# Patient Record
Sex: MALE | Race: WHITE | NOT HISPANIC OR LATINO | Employment: OTHER | URBAN - METROPOLITAN AREA
[De-identification: names, ages, dates, MRNs, and addresses within clinical notes are randomized per-mention and may not be internally consistent; named-entity substitution may affect disease eponyms.]

---

## 2017-01-11 ENCOUNTER — GENERIC CONVERSION - ENCOUNTER (OUTPATIENT)
Dept: OTHER | Facility: OTHER | Age: 57
End: 2017-01-11

## 2017-01-11 ENCOUNTER — TRANSCRIBE ORDERS (OUTPATIENT)
Dept: ADMINISTRATIVE | Facility: HOSPITAL | Age: 57
End: 2017-01-11

## 2017-01-11 ENCOUNTER — ALLSCRIPTS OFFICE VISIT (OUTPATIENT)
Dept: OTHER | Facility: OTHER | Age: 57
End: 2017-01-11

## 2017-01-11 ENCOUNTER — HOSPITAL ENCOUNTER (OUTPATIENT)
Dept: RADIOLOGY | Facility: HOSPITAL | Age: 57
Discharge: HOME | End: 2017-01-11
Payer: COMMERCIAL

## 2017-01-11 DIAGNOSIS — K74.60 CIRRHOSIS OF LIVER (HCC): ICD-10-CM

## 2017-01-11 DIAGNOSIS — M79.671 PAIN OF RIGHT FOOT: ICD-10-CM

## 2017-01-11 PROCEDURE — 73630 X-RAY EXAM OF FOOT: CPT

## 2017-01-17 ENCOUNTER — GENERIC CONVERSION - ENCOUNTER (OUTPATIENT)
Dept: OTHER | Facility: OTHER | Age: 57
End: 2017-01-17

## 2017-01-17 ENCOUNTER — LAB CONVERSION - ENCOUNTER (OUTPATIENT)
Dept: OTHER | Facility: OTHER | Age: 57
End: 2017-01-17

## 2017-01-17 LAB — HBA1C MFR BLD HPLC: 8.9 % OF TOTAL HGB

## 2017-02-18 ENCOUNTER — GENERIC CONVERSION - ENCOUNTER (OUTPATIENT)
Dept: OTHER | Facility: OTHER | Age: 57
End: 2017-02-18

## 2017-02-27 ENCOUNTER — HOSPITAL ENCOUNTER (EMERGENCY)
Facility: HOSPITAL | Age: 57
Discharge: HOME | End: 2017-02-27
Attending: EMERGENCY MEDICINE | Admitting: EMERGENCY MEDICINE
Payer: COMMERCIAL

## 2017-02-27 ENCOUNTER — APPOINTMENT (EMERGENCY)
Dept: CT IMAGING | Facility: HOSPITAL | Age: 57
End: 2017-02-27
Payer: COMMERCIAL

## 2017-02-27 VITALS
TEMPERATURE: 98 F | BODY MASS INDEX: 37.81 KG/M2 | WEIGHT: 241.4 LBS | HEART RATE: 82 BPM | RESPIRATION RATE: 18 BRPM | DIASTOLIC BLOOD PRESSURE: 67 MMHG | SYSTOLIC BLOOD PRESSURE: 149 MMHG | OXYGEN SATURATION: 96 %

## 2017-02-27 DIAGNOSIS — H53.412 SCOTOMA, LEFT: Primary | ICD-10-CM

## 2017-02-27 LAB
ANION GAP SERPL CALCULATED.3IONS-SCNC: 8 MMOL/L (ref 4–13)
BASOPHILS # BLD AUTO: 0.02 THOUSANDS/ΜL (ref 0–0.1)
BASOPHILS NFR BLD AUTO: 0 % (ref 0–1)
BUN SERPL-MCNC: 14 MG/DL (ref 5–25)
CALCIUM SERPL-MCNC: 9.1 MG/DL (ref 8.3–10.1)
CHLORIDE SERPL-SCNC: 100 MMOL/L (ref 100–108)
CO2 SERPL-SCNC: 26 MMOL/L (ref 21–32)
CREAT SERPL-MCNC: 1.23 MG/DL (ref 0.6–1.3)
EOSINOPHIL # BLD AUTO: 0.19 THOUSAND/ΜL (ref 0–0.61)
EOSINOPHIL NFR BLD AUTO: 2 % (ref 0–6)
ERYTHROCYTE [DISTWIDTH] IN BLOOD BY AUTOMATED COUNT: 13.5 % (ref 11.6–15.1)
GFR SERPL CREATININE-BSD FRML MDRD: >60 ML/MIN/1.73SQ M
GLUCOSE SERPL-MCNC: 275 MG/DL (ref 65–140)
HCT VFR BLD AUTO: 46.6 % (ref 36.5–49.3)
HGB BLD-MCNC: 15.9 G/DL (ref 12–17)
LYMPHOCYTES # BLD AUTO: 2.11 THOUSANDS/ΜL (ref 0.6–4.47)
LYMPHOCYTES NFR BLD AUTO: 27 % (ref 14–44)
MCH RBC QN AUTO: 28.6 PG (ref 26.8–34.3)
MCHC RBC AUTO-ENTMCNC: 34.1 G/DL (ref 31.4–37.4)
MCV RBC AUTO: 84 FL (ref 82–98)
MONOCYTES # BLD AUTO: 0.64 THOUSAND/ΜL (ref 0.17–1.22)
MONOCYTES NFR BLD AUTO: 8 % (ref 4–12)
NEUTROPHILS # BLD AUTO: 4.94 THOUSANDS/ΜL (ref 1.85–7.62)
NEUTS SEG NFR BLD AUTO: 63 % (ref 43–75)
PLATELET # BLD AUTO: 192 THOUSANDS/UL (ref 149–390)
PMV BLD AUTO: 11.2 FL (ref 8.9–12.7)
POTASSIUM SERPL-SCNC: 5 MMOL/L (ref 3.5–5.3)
RBC # BLD AUTO: 5.55 MILLION/UL (ref 3.88–5.62)
SODIUM SERPL-SCNC: 134 MMOL/L (ref 136–145)
WBC # BLD AUTO: 7.9 THOUSAND/UL (ref 4.31–10.16)

## 2017-02-27 PROCEDURE — 70450 CT HEAD/BRAIN W/O DYE: CPT

## 2017-02-27 PROCEDURE — 36415 COLL VENOUS BLD VENIPUNCTURE: CPT | Performed by: EMERGENCY MEDICINE

## 2017-02-27 PROCEDURE — 85025 COMPLETE CBC W/AUTO DIFF WBC: CPT | Performed by: EMERGENCY MEDICINE

## 2017-02-27 PROCEDURE — 93005 ELECTROCARDIOGRAM TRACING: CPT | Performed by: EMERGENCY MEDICINE

## 2017-02-27 PROCEDURE — 80048 BASIC METABOLIC PNL TOTAL CA: CPT | Performed by: EMERGENCY MEDICINE

## 2017-02-27 PROCEDURE — 99284 EMERGENCY DEPT VISIT MOD MDM: CPT

## 2017-02-28 LAB
ATRIAL RATE: 90 BPM
P AXIS: 38 DEGREES
PR INTERVAL: 136 MS
QRS AXIS: -11 DEGREES
QRSD INTERVAL: 106 MS
QT INTERVAL: 364 MS
QTC INTERVAL: 438 MS
T WAVE AXIS: 29 DEGREES
VENTRICULAR RATE: 87 BPM

## 2017-04-21 ENCOUNTER — GENERIC CONVERSION - ENCOUNTER (OUTPATIENT)
Dept: OTHER | Facility: OTHER | Age: 57
End: 2017-04-21

## 2017-06-08 ENCOUNTER — GENERIC CONVERSION - ENCOUNTER (OUTPATIENT)
Dept: OTHER | Facility: OTHER | Age: 57
End: 2017-06-08

## 2017-06-14 ENCOUNTER — GENERIC CONVERSION - ENCOUNTER (OUTPATIENT)
Dept: OTHER | Facility: OTHER | Age: 57
End: 2017-06-14

## 2017-07-23 ENCOUNTER — HOSPITAL ENCOUNTER (EMERGENCY)
Facility: HOSPITAL | Age: 57
Discharge: HOME | End: 2017-07-23
Attending: EMERGENCY MEDICINE
Payer: COMMERCIAL

## 2017-07-23 VITALS
HEART RATE: 87 BPM | SYSTOLIC BLOOD PRESSURE: 148 MMHG | TEMPERATURE: 97.6 F | BODY MASS INDEX: 38.43 KG/M2 | RESPIRATION RATE: 16 BRPM | DIASTOLIC BLOOD PRESSURE: 94 MMHG | WEIGHT: 245.37 LBS | OXYGEN SATURATION: 95 %

## 2017-07-23 DIAGNOSIS — K52.9 GASTROENTERITIS: Primary | ICD-10-CM

## 2017-07-23 LAB
ACETONE SERPL-MCNC: NEGATIVE MG/DL
ALBUMIN SERPL BCP-MCNC: 3.4 G/DL (ref 3.5–5)
ALP SERPL-CCNC: 150 U/L (ref 46–116)
ALT SERPL W P-5'-P-CCNC: 25 U/L (ref 12–78)
ANION GAP SERPL CALCULATED.3IONS-SCNC: 10 MMOL/L (ref 4–13)
AST SERPL W P-5'-P-CCNC: 14 U/L (ref 5–45)
BASOPHILS # BLD AUTO: 0.02 THOUSANDS/ΜL (ref 0–0.1)
BASOPHILS NFR BLD AUTO: 0 % (ref 0–1)
BILIRUB SERPL-MCNC: 0.6 MG/DL (ref 0.2–1)
BUN SERPL-MCNC: 29 MG/DL (ref 5–25)
CALCIUM SERPL-MCNC: 9.5 MG/DL (ref 8.3–10.1)
CHLORIDE SERPL-SCNC: 100 MMOL/L (ref 100–108)
CO2 SERPL-SCNC: 25 MMOL/L (ref 21–32)
CREAT SERPL-MCNC: 1.89 MG/DL (ref 0.6–1.3)
EOSINOPHIL # BLD AUTO: 0.06 THOUSAND/ΜL (ref 0–0.61)
EOSINOPHIL NFR BLD AUTO: 1 % (ref 0–6)
ERYTHROCYTE [DISTWIDTH] IN BLOOD BY AUTOMATED COUNT: 14 % (ref 11.6–15.1)
GFR SERPL CREATININE-BSD FRML MDRD: 36.9 ML/MIN/1.73SQ M
GLUCOSE SERPL-MCNC: 317 MG/DL (ref 65–140)
GLUCOSE SERPL-MCNC: 378 MG/DL (ref 65–140)
HCT VFR BLD AUTO: 47.1 % (ref 36.5–49.3)
HGB BLD-MCNC: 15.9 G/DL (ref 12–17)
LIPASE SERPL-CCNC: 137 U/L (ref 73–393)
LYMPHOCYTES # BLD AUTO: 1.61 THOUSANDS/ΜL (ref 0.6–4.47)
LYMPHOCYTES NFR BLD AUTO: 17 % (ref 14–44)
MCH RBC QN AUTO: 28.3 PG (ref 26.8–34.3)
MCHC RBC AUTO-ENTMCNC: 33.8 G/DL (ref 31.4–37.4)
MCV RBC AUTO: 84 FL (ref 82–98)
MONOCYTES # BLD AUTO: 0.57 THOUSAND/ΜL (ref 0.17–1.22)
MONOCYTES NFR BLD AUTO: 6 % (ref 4–12)
NEUTROPHILS # BLD AUTO: 7.31 THOUSANDS/ΜL (ref 1.85–7.62)
NEUTS SEG NFR BLD AUTO: 76 % (ref 43–75)
PLATELET # BLD AUTO: 209 THOUSANDS/UL (ref 149–390)
PMV BLD AUTO: 10.8 FL (ref 8.9–12.7)
POTASSIUM SERPL-SCNC: 4.4 MMOL/L (ref 3.5–5.3)
PROT SERPL-MCNC: 7.7 G/DL (ref 6.4–8.2)
RBC # BLD AUTO: 5.61 MILLION/UL (ref 3.88–5.62)
SODIUM SERPL-SCNC: 135 MMOL/L (ref 136–145)
WBC # BLD AUTO: 9.57 THOUSAND/UL (ref 4.31–10.16)

## 2017-07-23 PROCEDURE — 82009 KETONE BODYS QUAL: CPT | Performed by: EMERGENCY MEDICINE

## 2017-07-23 PROCEDURE — 99283 EMERGENCY DEPT VISIT LOW MDM: CPT

## 2017-07-23 PROCEDURE — 96361 HYDRATE IV INFUSION ADD-ON: CPT

## 2017-07-23 PROCEDURE — 96360 HYDRATION IV INFUSION INIT: CPT

## 2017-07-23 PROCEDURE — 82948 REAGENT STRIP/BLOOD GLUCOSE: CPT

## 2017-07-23 PROCEDURE — 80053 COMPREHEN METABOLIC PANEL: CPT | Performed by: EMERGENCY MEDICINE

## 2017-07-23 PROCEDURE — 96374 THER/PROPH/DIAG INJ IV PUSH: CPT

## 2017-07-23 PROCEDURE — 85025 COMPLETE CBC W/AUTO DIFF WBC: CPT | Performed by: EMERGENCY MEDICINE

## 2017-07-23 PROCEDURE — 36415 COLL VENOUS BLD VENIPUNCTURE: CPT | Performed by: EMERGENCY MEDICINE

## 2017-07-23 PROCEDURE — 83690 ASSAY OF LIPASE: CPT | Performed by: EMERGENCY MEDICINE

## 2017-07-23 RX ORDER — DICYCLOMINE HCL 20 MG
20 TABLET ORAL 2 TIMES DAILY
Qty: 20 TABLET | Refills: 0 | Status: SHIPPED | OUTPATIENT
Start: 2017-07-23 | End: 2018-07-29 | Stop reason: ALTCHOICE

## 2017-07-23 RX ORDER — ONDANSETRON 4 MG/1
4 TABLET, ORALLY DISINTEGRATING ORAL EVERY 8 HOURS PRN
Qty: 20 TABLET | Refills: 0 | Status: SHIPPED | OUTPATIENT
Start: 2017-07-23 | End: 2018-03-21

## 2017-07-23 RX ORDER — OMEPRAZOLE 40 MG/1
40 CAPSULE, DELAYED RELEASE ORAL
COMMUNITY
End: 2018-03-28

## 2017-07-23 RX ORDER — DIPHENOXYLATE HYDROCHLORIDE AND ATROPINE SULFATE 2.5; .025 MG/1; MG/1
TABLET ORAL 2 TIMES DAILY
COMMUNITY
Start: 2016-09-12 | End: 2018-03-12 | Stop reason: SDUPTHER

## 2017-07-23 RX ORDER — CARVEDILOL 3.12 MG/1
TABLET ORAL DAILY
COMMUNITY
Start: 2016-01-26 | End: 2018-06-15

## 2017-07-23 RX ORDER — KETOROLAC TROMETHAMINE 30 MG/ML
15 INJECTION, SOLUTION INTRAMUSCULAR; INTRAVENOUS ONCE
Status: DISCONTINUED | OUTPATIENT
Start: 2017-07-23 | End: 2017-07-23 | Stop reason: HOSPADM

## 2017-07-23 RX ORDER — GABAPENTIN 600 MG/1
600 TABLET ORAL DAILY
Status: ON HOLD | COMMUNITY
End: 2018-08-12 | Stop reason: CLARIF

## 2017-07-23 RX ORDER — DICYCLOMINE HCL 20 MG
TABLET ORAL
Status: COMPLETED
Start: 2017-07-23 | End: 2017-07-23

## 2017-07-23 RX ORDER — LOVASTATIN 20 MG/1
TABLET ORAL DAILY
COMMUNITY
Start: 2016-04-12 | End: 2018-04-23 | Stop reason: SDUPTHER

## 2017-07-23 RX ORDER — ONDANSETRON 2 MG/ML
4 INJECTION INTRAMUSCULAR; INTRAVENOUS ONCE
Status: DISCONTINUED | OUTPATIENT
Start: 2017-07-23 | End: 2017-07-23 | Stop reason: HOSPADM

## 2017-07-23 RX ORDER — INSULIN ASPART 100 [IU]/ML
30 INJECTION, SUSPENSION SUBCUTANEOUS 2 TIMES DAILY
COMMUNITY
End: 2018-03-21 | Stop reason: SDUPTHER

## 2017-07-23 RX ORDER — LISINOPRIL 40 MG/1
40 TABLET ORAL
COMMUNITY
End: 2018-03-28

## 2017-07-23 RX ADMIN — SODIUM CHLORIDE 1000 ML: 0.9 INJECTION, SOLUTION INTRAVENOUS at 11:27

## 2017-07-23 RX ADMIN — DICYCLOMINE HYDROCHLORIDE 20 MG: 20 TABLET ORAL at 13:55

## 2017-07-23 RX ADMIN — SODIUM CHLORIDE 1000 ML: 0.9 INJECTION, SOLUTION INTRAVENOUS at 12:48

## 2017-07-23 RX ADMIN — INSULIN HUMAN 5 UNITS: 100 INJECTION, SOLUTION PARENTERAL at 12:57

## 2018-01-10 NOTE — MISCELLANEOUS
Message   Recorded as Task   Date: 01/17/2017 07:38 AM, Created By: Pal Jett   Task Name: Call Patient with results   Assigned To: Pal Jett   Regarding Patient: Juana Foote, Status: In Progress   Comment:    Raj Auguste - 17 Jan 2017 7:38 AM     Patient Phone: [phone]      blood test results are back and HGA1C is still elevated at 8 9%  Is he taking 30 units twice a day of 70/30 insulin? Any missed doses of insulin? if yes, how many missed per week? Patrice Desai needs to make sure his home blood sugar meter is working correctly, should purchase 'control' solution for his meter at pharmacy which will verify its accuracy  then, please have Patrice Desai schedule an appt 1 week later with at least twice a day(morning and before dinner OR 2 hrs after dinner) blood sugar readings to bring to office for review  let me know if questions   Lina Peña - 17 Jan 2017 8:31 AM     TASK REASSIGNED: Previously Assigned To George Stone - 17 Jan 2017 8:57 AM     TASK IN PROGRESS   Lina Peña - 17 Jan 2017 8:57 AM     TASK EDITED  left message   Sasha Alicia - 17 Jan 2017 8:57 AM     TASK EDITED  left message for pt to call back   Lina Peña - 17 Jan 2017 1:54 PM     TASK EDITED  left message for pt to call back, pt had called and spoke with Myriam Miguel gave me the message to call him back   Lina Peña - 17 Jan 2017 3:14 PM     TASK EDITED  Patient's wife will have patient call back   Sasha Vargas - 17 Jan 2017 3:48 PM     TASK EDITED  Spoke with patient who stated that he is taking the 30 units daily and has not missed a dose  He stated that for the past two days he has not checked his sugar his machine broke and when he called to get a new one they stated it would be $150 and he does not have the money to purchase  Wanted to know if we can give him a machine that he can   He scheduled an appt with us next wednesday     Raj Auguste - 17 Jan 2017 4:18 PM     TASK REPLIED TO: Previously Assigned To Raj Auguste  yes, that's fine    we can give him accu-check but I don't know if supplies are covered by his insurance    let me know if he would like accu-check meter, so I can order supplies for his new meter    jose albertoholley   KeiraSusanneBrenda - 17 Jan 2017 5:08 PM     TASK EDITED  spoke to pt and pt wife   pt asked that we just order the supplies to his pharmacy to see if its covered and said he can  a meter in office on Thursday        Plan  Diabetes mellitus type 2, controlled    · Accu-Chek FastClix Lancets Miscellaneous; TEST up to 3 TIMES DAILY OR AS  DIRECTED   · Accu-Chek Zayda Plus In Vitro Strip; TEST UP TO 3 TIMES DAILY    Signatures   Electronically signed by : Rosalind Handley DO; Jan 17 2017  5:20PM EST                       (Author)

## 2018-01-10 NOTE — PROGRESS NOTES
History of Present Illness  Care Coordination Encounter Information:   Type of Encounter: Telephonic    Spoke to Other   voice mail  Care Coordination SL Nurse Dino Staff:   The reason for call is to discuss outreach for follow up/needed services and coordination of meeting care plan treatment goals  Left voice mail message in response to message left by pt's spouse asking I return her call  Active Problems    1  Abnormal INR (790 92) (R79 1)   2  Acute upper respiratory infection (465 9) (J06 9)   3  Bacteremia due to Klebsiella pneumoniae (790 7,041 3) (R78 81)   4  Balanitis (607 1) (N48 1)   5  Cholecystostomy care (V55 4) (Z43 4)   6  Cirrhosis (571 5) (K74 60)   7  Dermatitis (692 9) (L30 9)   8  Diabetes mellitus type 2, controlled (250 00) (E11 9)   9  Diabetic polyneuropathy associated with type 2 diabetes mellitus (250 60,357 2)   (E11 42)   10  Dizziness (780 4) (R42)   11  Dry skin (701 1) (L85 3)   12  Elevated serum creatinine (790 99) (R79 89)   13  Flank pain, acute (789 09,338 19) (R10 9)   14  History of acute cholecystitis (V12 79) (Z87 19)   15  History of diarrhea (V12 79) (Z87 898)   16  History of heartburn (V12 79) (Z87 898)   17  Hypertension (401 9) (I10)   18  Hypertriglyceridemia (272 1) (E78 1)   19  Left sided abdominal pain (789 09) (R10 9)   20  Liver disease (573 9) (K76 9)   21  Nausea & vomiting (787 01) (R11 2)   22  Right foot pain (729 5) (M79 671)    Past Medical History    1  History of Fecal incontinence (787 60) (R15 9)   2  History of osteomyelitis (V13 59) (Z87 39)    Surgical History    1  History of Cholecystectomy Laparoscopic   2  History of Percutaneous Cholecystostomy   3  History of Surgery Right Foot Amputation DIP Third Toe    Family History  Mother    1  Denied: Family history of Colon cancer   2  Denied: Family history of Crohn's disease without complication, unspecified   gastrointestinal tract location   3   Denied: Family history of liver disease 4  Family history of lung cancer (V16 1) (Z80 1)  Father    5  Denied: Family history of Colon cancer   6  Denied: Family history of Crohn's disease without complication, unspecified   gastrointestinal tract location   7  Fam hx-ischem heart disease (V17 3) (Z82 49)   8  Denied: Family history of liver disease  Sister    5  Family history of multiple myeloma (V16 7) (Z80 7)   10  FHx: breast cancer (V16 3) (Z80 3)  Family History    11  Family history of kidney stone (V18 69) (Z84 1)    Social History    · Currently working   · Daily caffeine consumption, 2-3 servings a day   ·    · Never a smoker   · No alcohol use    Current Meds    1  ProAir  (90 Base) MCG/ACT Inhalation Aerosol Solution; INHALE 1 PUFFS Every   6 hours PRN SOB; Therapy: 12Apr2016 to (Last Rx:12Apr2016)  Requested for: 12Apr2016 Ordered    2  Accu-Chek Zayda Plus In Vitro Strip; TEST UP TO 3 TIMES DAILY; Therapy: 76HYN3878 to (Last Rx:17Jan2017)  Requested for: 11HLV9153 Ordered   3  Accu-Chek FastClix Lancets Miscellaneous; TEST up to 3 TIMES DAILY OR AS   DIRECTED; Therapy: 32WLQ4410 to (Last Rx:17Jan2017)  Requested for: 54DUP3496 Ordered   4  Lovastatin 20 MG Oral Tablet; TAKE 1 TABLET DAILY AS DIRECTED; Therapy: 12Apr2016 to (Keely Sweeney)  Requested for: 12Apr2016; Last   Rx:12Apr2016 Ordered   5  NovoLIN 70/30 (70-30) 100 UNIT/ML Subcutaneous Suspension; administer 30 units in   am and 30 in pm - OR AS DIRECTED; Therapy: 63VMM2993 to (Last Rx:11Jan2017)  Requested for: 13WRJ3410 Ordered    6  Diphenoxylate-Atropine 2 5-0 025 MG Oral Tablet; Take 1-2 tablets daily as needed; Therapy: 65TMK6309 to (Evaluate:44Ork3931)  Requested for: 61TLG3067; Last   Rx:16Yqm6920 Ordered    7  Omeprazole 10 MG Oral Capsule Delayed Release; TAKE 1 CAPSULE ONCE A DAY; Therapy: 04EEF3168 to (Last Rx:11Jan2017)  Requested for: 96JNL7634 Ordered    8   AmLODIPine Besylate 5 MG Oral Tablet; take 1 tablet by mouth every day; Therapy: 27MDA1822 to (Nai Hernandezs)  Requested for: 89XDS4012; Last   Rx:04Nov2016 Ordered   9  Carvedilol 3 125 MG Oral Tablet; take 1 tablet once a day; Therapy: 02CGT9692 to (Evaluate:04Jun2017)  Requested for: 99YQY6449; Last   Rx:88Vrf0880 Ordered    10  Cephalexin 500 MG Oral Capsule; TAKE 1 CAPSULE EVERY 8 HOURS UNTIL GONE;    Therapy: 96UIF4752 to (Evaluate:18Jan2017)  Requested for: 73XGH8573; Last    Rx:11Jan2017 Ordered    Allergies    1  No Known Drug Allergies    Health Management   (1) ALPHA-FETOPROTEIN WITH AFP-L3%; every 6 months; Last 10Xwv2027; Next Due: 78Hrc7714; Overdue    End of Encounter Meds    1  ProAir  (90 Base) MCG/ACT Inhalation Aerosol Solution; INHALE 1 PUFFS Every   6 hours PRN SOB; Therapy: 12Apr2016 to (Last Rx:12Apr2016)  Requested for: 12Apr2016 Ordered    2  Accu-Chek Zayda Plus In Vitro Strip; TEST UP TO 3 TIMES DAILY; Therapy: 88KTV8748 to (Last Rx:17Jan2017)  Requested for: 44HBO9592 Ordered   3  Accu-Chek FastClix Lancets Miscellaneous; TEST up to 3 TIMES DAILY OR AS   DIRECTED; Therapy: 37AQL0799 to (Last Rx:17Jan2017)  Requested for: 66TPC7618 Ordered   4  Lovastatin 20 MG Oral Tablet; TAKE 1 TABLET DAILY AS DIRECTED; Therapy: 12Apr2016 to (Jeffrey Craft)  Requested for: 12Apr2016; Last   Rx:12Apr2016 Ordered   5  NovoLIN 70/30 (70-30) 100 UNIT/ML Subcutaneous Suspension; administer 30 units in   am and 30 in pm - OR AS DIRECTED; Therapy: 76HIP5812 to (Last Rx:11Jan2017)  Requested for: 47SOW5271 Ordered    6  Diphenoxylate-Atropine 2 5-0 025 MG Oral Tablet; Take 1-2 tablets daily as needed; Therapy: 02KFJ5985 to (Evaluate:16May2017)  Requested for: 73JSU7482; Last   Rx:99Cro4613 Ordered    7  Omeprazole 10 MG Oral Capsule Delayed Release; TAKE 1 CAPSULE ONCE A DAY; Therapy: 59MAP0434 to (Last Rx:11Jan2017)  Requested for: 87INH5795 Ordered    8  AmLODIPine Besylate 5 MG Oral Tablet; take 1 tablet by mouth every day;    Therapy: 23FDE1073 to (Yamel Gustafson)  Requested for: 54XZG4080; Last   Rx:04Nov2016 Ordered   9  Carvedilol 3 125 MG Oral Tablet; take 1 tablet once a day; Therapy: 13CDK5067 to (Evaluate:04Jun2017)  Requested for: 31ZKS1371; Last   Rx:77Fsf4991 Ordered    10  Cephalexin 500 MG Oral Capsule; TAKE 1 CAPSULE EVERY 8 HOURS UNTIL GONE;    Therapy: 86TWJ4622 to (Evaluate:18Jan2017)  Requested for: 21HGN1354; Last    Rx:11Jan2017 Ordered    Patient Care Team    Care Team Member Role Specialty Office Number   Amy PRITCHARD   Specialist Gastroenterology Adult [phone]     Signatures   Electronically signed by : Teresa Lin RN; Apr 21 2017 10:10AM EST                       (Author)

## 2018-01-11 NOTE — RESULT NOTES
Message   let patient know - lab test results are back  blood count & liver blood work looks fine but kidney blood work is mildly elevated which can occur from dehydration  recommend to continue with current treatment plan AND increase hydration with fluids such as water by 2-3 cups per day & repeat kidney blood work in about 2 weeks  let me know if questions     Verified Results  (1) COMPREHENSIVE METABOLIC PANEL  18:39LW Lily Flannery     Test Name Result Flag Reference   GLUCOSE 91 mg/dL  65-99   Fasting reference interval   UREA NITROGEN (BUN) 32 mg/dL H 7-25   CREATININE 1 39 mg/dL H 0 70-1 33   For patients >52years of age, the reference limit  for Creatinine is approximately 13% higher for people  identified as -American  eGFR NON-AFR   AMERICAN 57 mL/min/1 73m2 L > OR = 60   eGFR AFRICAN AMERICAN 66 mL/min/1 73m2  > OR = 60   BUN/CREATININE RATIO 23 (calc) H 6-22   SODIUM 140 mmol/L  135-146   POTASSIUM 4 5 mmol/L  3 5-5 3   CHLORIDE 107 mmol/L     CARBON DIOXIDE 26 mmol/L  19-30   CALCIUM 10 1 mg/dL  8 6-10 3   PROTEIN, TOTAL 7 6 g/dL  6 1-8 1   ALBUMIN 4 6 g/dL  3 6-5 1   GLOBULIN 3 0 g/dL (calc)  1 9-3 7   ALBUMIN/GLOBULIN RATIO 1 5 (calc)  1 0-2 5   BILIRUBIN, TOTAL 0 9 mg/dL  0 2-1 2   ALKALINE PHOSPHATASE 79 U/L     AST 22 U/L  10-35   ALT 21 U/L  9-46     (1) CBC/PLT/DIFF 2016 11:19AM Raj Auguste   REPORT COMMENT:  FASTING:NO  AN UPDATE OR CORRECTION HAS BEEN MADE TO      Test Name Result Flag Reference   WHITE BLOOD CELL COUNT 6 1 Thousand/uL  3 8-10 8   RED BLOOD CELL COUNT 4 93 Million/uL  4 20-5 80   HEMOGLOBIN 14 0 g/dL  13 2-17 1   HEMATOCRIT 43 0 %  38 5-50 0   MCV 87 3 fL  80 0-100 0   MCH 28 3 pg  27 0-33 0   MCHC 32 4 g/dL  32 0-36 0   RDW 15 7 % H 11 0-15 0   PLATELET COUNT 387 Thousand/uL  140-400   MPV 9 9 fL  7 5-11 5   ABSOLUTE NEUTROPHILS 3495 cells/uL  7691-6255   ABSOLUTE LYMPHOCYTES 1922 cells/uL  850-3900   ABSOLUTE MONOCYTES 488 cells/uL 200-950   ABSOLUTE EOSINOPHILS 165 cells/uL     ABSOLUTE BASOPHILS 31 cells/uL  0-200   NEUTROPHILS 57 3 %     LYMPHOCYTES 31 5 %     MONOCYTES 8 0 %     EOSINOPHILS 2 7 %     BASOPHILS 0 5 %         Plan  Elevated serum creatinine, Hypertension    · (1) BASIC METABOLIC PROFILE; Status:Active;  Requested for:21Zmj5097;

## 2018-01-11 NOTE — RESULT NOTES
Message   let Dwayne Mehta know - x-ray results are back and look fine  There are no fractures or bony changes in right foot  Recommend to continue with current treatment plan, thanks     Verified Results  * XR FOOT 3+ VIEW RIGHT 11Jan2017 06:12PM Julisa ROBLERO Order Number: [id number]   Performing Comments: 65 y/o M with right foot pain along lateral portion of foot - r/o occult injury/fx     Test Name Result Flag Reference   XR FOOT 3+ VW RIGHT (Report)     RIGHT FOOT     INDICATION: Diabetic right foot pain  COMPARISON: 10/28/2016  VIEWS: 3; 3 images     FINDINGS: Status post right 3rd toe resection of the proximal interphalangeal joint as before  There is no acute fracture or dislocation  No degenerative changes  Plantar and retrocalcaneal spurs  Vascular calcification noted  IMPRESSION:     No acute osseous abnormality         Workstation performed: [id number]     Signed by:   Jonathon Moise MD   1/11/17

## 2018-01-11 NOTE — MISCELLANEOUS
Message   Recorded as Task   Date: 07/26/2016 11:16 AM, Created By: Ricco Schmidt   Task Name: Go to Med   Assigned To: Raj Auguste   Regarding Patient: Levi Zimmer, Status: Active   Comment:    Libertad Cheng - 26 Jul 2016 11:16 AM     TASK CREATED  Patient would like diphenoxylate and ranitidine sent to [facility] on [facility] (located in [facility])     Elvia Prinsburg - 26 Jul 2016 11:18 AM     TASK REASSIGNED: Previously Assigned To Arlen 31 changed for pt's medication per pt request      Plan  Diabetes mellitus type 2, controlled    · Invokana 300 MG Oral Tablet; 1 po daily  History of heartburn    · Ranitidine HCl - 150 MG Oral Tablet; TAKE ONE TABLET BY MOUTH ONCE  DAILY    Signatures   Electronically signed by Lisandra Ferguson DO; Jul 26 2016 11:30AM EST                       (Author)

## 2018-01-12 NOTE — RESULT NOTES
Verified Results  Blood Glucose- POC 13VSS7702 11:12AM Jeff Lackey     Test Name Result Flag Reference   Glucose Finger Stick 280                   (1) COMPREHENSIVE METABOLIC PANEL 74IUC1356 38:55CO Flonnie Pilger     Test Name Result Flag Reference   GLUCOSE 241 mg/dL H 65-99   Fasting reference interval   UREA NITROGEN (BUN) 15 mg/dL  7-25   CREATININE 1 20 mg/dL  0 70-1 33   For patients >52years of age, the reference limit  for Creatinine is approximately 13% higher for people  identified as -American  eGFR NON-AFR  AMERICAN 67 mL/min/1 73m2  > OR = 60   eGFR AFRICAN AMERICAN 78 mL/min/1 73m2  > OR = 60   BUN/CREATININE RATIO   7-09   NOT APPLICABLE (calc)   SODIUM 138 mmol/L  135-146   POTASSIUM 4 4 mmol/L  3 5-5 3   CHLORIDE 103 mmol/L     CARBON DIOXIDE 26 mmol/L  20-31   CALCIUM 9 7 mg/dL  8 6-10 3   PROTEIN, TOTAL 6 7 g/dL  6 1-8 1   ALBUMIN 4 0 g/dL  3 6-5 1   GLOBULIN 2 7 g/dL (calc)  1 9-3 7   ALBUMIN/GLOBULIN RATIO 1 5 (calc)  1 0-2 5   BILIRUBIN, TOTAL 0 6 mg/dL  0 2-1 2   ALKALINE PHOSPHATASE 93 U/L     AST 16 U/L  10-35   ALT 20 U/L  9-46     (1) LIPID PANEL, FASTING 10Aug2016 08:50AM Raj Auguste     Test Name Result Flag Reference   CHOLESTEROL, TOTAL 203 mg/dL H 125-200   HDL CHOLESTEROL 35 mg/dL L > OR = 40   TRIGLICERIDES 347 mg/dL H <150   LDL-CHOLESTEROL 114 mg/dL (calc)  <130   Desirable range <100 mg/dL for patients with CHD or  diabetes and <70 mg/dL for diabetic patients with  known heart disease  CHOL/HDLC RATIO 5 8 (calc) H < OR = 5 0   NON HDL CHOLESTEROL 168 mg/dL (calc) H    Target for non-HDL cholesterol is 30 mg/dL higher than   LDL cholesterol target  (Q) HEMOGLOBIN A1c 10Aug2016 08:50AM Raj Auguste   REPORT COMMENT:  FASTING:YES     Test Name Result Flag Reference   HEMOGLOBIN A1c 8 9 % of total Hgb H <5 7   According to ADA guidelines, hemoglobin A1c <7 0%  represents optimal control in non-pregnant diabetic  patients   Different metrics may apply to specific  patient populations  Standards of Medical Care in    Diabetes Care  2013;36:s11-s66     For the purpose of screening for the presence of  diabetes  <5 7%       Consistent with the absence of diabetes  5 7-6 4%    Consistent with increased risk for diabetes              (prediabetes)  >or=6 5%    Consistent with diabetes     This assay result is consistent with diabetes  mellitus  Currently, no consensus exists for use of hemoglobin  A1c for diagnosis of diabetes for children  (Q) MICROALBUMIN, RANDOM URINE (W/CREATININE) [id number] 08:50AM [facility]     Test Name Result Flag Reference   CREATININE, RANDOM URINE 49 mg/dL     MICROALBUMIN 137 7 mg/dL     Verified by repeat analysis  Reference Range  Not established   MICROALBUMIN/CREATININE$RATIO, RANDOM URINE 2810 mcg/mg creat H <30   The ADA defines abnormalities in albumin  excretion as follows:     Category         Result (mcg/mg creatinine)     Normal                    <30  Microalbuminuria            Clinical albuminuria   > OR = 300     The ADA recommends that at least two of three  specimens collected within a 3-6 month period be  abnormal before considering a patient to be  within a diagnostic category

## 2018-01-12 NOTE — MISCELLANEOUS
Message  pt's wife here requesting cough syrup refill for patient    patient seen by PCP last week for URI (wife present during appt last week)    pt feeling better from URI but declines tessalon rx for cough    plan - codeine cough syrup refill x1   advised pt's wife, if patient not improved, needs to make appt for re-eval      Plan  Acute upper respiratory infection    · Guaifenesin-Codeine 100-10 MG/5ML Oral Syrup; TAKE 5 ML Every 6 hours PRN  cough    Signatures   Electronically signed by : Brooklynn Larson DO;  Apr 20 2016  3:44PM EST                       (Author)

## 2018-01-12 NOTE — MISCELLANEOUS
Message   Recorded as Task   Date: 06/08/2017 10:29 AM, Created By: Lalo Love   Task Name: Follow Up   Assigned To: Scot Brian   Regarding Patient: Shaquille Sandoval, Status: Active   Comment:    Myriam Malone - 08 Jun 2017 10:29 AM     TASK CREATED  Caller: chantel/wife; General Medical Question; [phone]  pts wife needs a recommendation for a surgeon  pt has a lump in his back/shoulder area that needs to be removed  they went to a [facility] urgent care (they do not know which one?) and were advised it should be removed  please advise   call  or [phone] after 1 pm    Raj Auguste - 08 Jun 2017 12:14 PM     TASK REPLIED TO: Previously Assigned To [place],Team  if it's on his back - recommend Dr Nick Chance or Dr Cady Martinez office)    ref entered    please provide phone # to call/schedule    thx        Plan  Lump of skin of back    · 2 - Cliff Roman DO  (General Surgery) Co-Management  61 y/o M with lump on back  - requests referral to surgeon    evaluate and treat  Status: Hold For - Scheduling  Requested for: [id number]  Care Summary provided  : Yes    Signatures   Electronically signed by : Arturo Hernandez DO; Jun 8 2017 12:16PM EST                       (Author)

## 2018-01-12 NOTE — MISCELLANEOUS
Message   Recorded as Task   Date: 07/28/2016 10:33 AM, Created By: Med Bourgeois   Task Name: Med Renewal Request   Assigned To: Raj Auguste   Regarding Patient: Lani Chand, Status: Active   CommentShirl Score - 28 Jul 2016 10:33 AM     TASK CREATED  I did a prior auth for Humulin 70/30  Spoke to Yamilet, she states the patient's perferred medication under plan is Novolin 70/30  They are going to fax a determination within 48hours  [phone]  Pt's [phone]     plan - spoke with Robin Torres pharmacist at [facility]   pt's prescription plan requests novolin 70/30 instead of humulin 70/30   plan rx changed per pt's pharmacy plan/coverage   called pt's home/cell phone and  for call back to discuss      Plan  Diabetes mellitus type 2, controlled    · From  HumuLIN 70/30 (70-30) 100 UNIT/ML Subcutaneous Suspension  administer 24 units in am and 24 in pm - OR AS  DIRECTED To NovoLIN 70/30 (70-30) 100 UNIT/ML Subcutaneous Suspension  administer 24 units in am and 24 in pm - OR  AS DIRECTED    Signatures   Electronically signed by Jesus Zheng DO; Jul 28 2016 11:04AM EST                       (Author)

## 2018-01-13 NOTE — MISCELLANEOUS
Message  GI Reminder Recall Nancy Robledo:   Date: 02/18/2017   Dear Maryam Ponce:     Review of our records shows you are due for the following: Labs  Please call the following office to schedule your appointment:   [facility], [address], [address] [place] [address]  [phone]  We look forward to hearing from you!      Sincerely,   Dr Irene Silva   Electronically signed by : Mina Gonzalez, ; Feb 18 2017  8:50AM EST                       (Author)

## 2018-01-13 NOTE — MISCELLANEOUS
Message   Recorded as Task   Date: 07/28/2016 09:43 AM, Created By: Elle Hill   Task Name: Med Renewal Request   Assigned To: Raj Auguste   Regarding Patient: Loreto Belle, Status: Active   Comment:    FaisalMyriam - 28 Jul 2016 9:43 AM     TASK CREATED  Caller: Self; Renew Medication; [phone] (Home); [phone] (Work)  PTS INSURANCE WONT PAY FOR A 30 DAY SUPPLY OF INVOKANA, IT HAS TO BE A 90 DAY SUPPLY   PLEASE SEND NEW PRESCRIPTION FOR 90 DAY SUPPLY TO [facility] IN [place]    plan - rx refilled  patient overdue for blood work, will ask staff to contact patient and mail lab requisition to pt's home      Plan  Diabetes mellitus type 2, controlled    · From  Invokana 300 MG Oral Tablet 1 po daily To Invokana 300 MG Oral  Tablet Take 1 tablet daily   · HumuLIN 70/30 (70-30) 100 UNIT/ML Subcutaneous Suspension; administer 24  units in am and 24 in pm - OR AS DIRECTED  Hypertension    · Lisinopril 40 MG Oral Tablet; TAKE 1 TABLET DAILY AS DIRECTED    Signatures   Electronically signed by Carol Ann Mcneil DO; Jul 28 2016  9:48AM EST                       (Author)

## 2018-01-13 NOTE — PROGRESS NOTES
History of Present Illness  Care Coordination Encounter Information:   Type of Encounter: Telephonic    Spoke to Spouse  Care Coordination [facility] Nurse Jimmy Carreon:   The reason for call is to discuss outreach for follow up/needed services and coordination of meeting care plan treatment goals  Eliana, spouse of pt returned my call  She stated that pt has an eye doctor appt on Monday 4/24 for laser surgery  She did not know the name of the eye doctor but said he was located off [address] [place]  she said his blood sugar readings have been under 200  She denied pt having had any falls  I asked if an appt with the gastroenterologist had been made  She admitted it had not been made yet but that she would soon schedule the appt with Dr Raquel Green  She declined my assistance but agreed to call me when the appt was made  She thanked me for the call  1530-I contacted [facility] who confirmed pt appt for Monday 4/24 at 9AM with Dr Riddhi Vargas  I left voice mail message with pt about the appt to arrive 15 minutes early and bring insurance and ID  Active Problems    1  Abnormal INR (790 92) (R79 1)   2  Acute upper respiratory infection (465 9) (J06 9)   3  Bacteremia due to Klebsiella pneumoniae (790 7,041 3) (R78 81)   4  Balanitis (607 1) (N48 1)   5  Cholecystostomy care (V55 4) (Z43 4)   6  Cirrhosis (571 5) (K74 60)   7  Dermatitis (692 9) (L30 9)   8  Diabetes mellitus type 2, controlled (250 00) (E11 9)   9  Diabetic polyneuropathy associated with type 2 diabetes mellitus (250 60,357 2)   (E11 42)   10  Dizziness (780 4) (R42)   11  Dry skin (701 1) (L85 3)   12  Elevated serum creatinine (790 99) (R79 89)   13  Flank pain, acute (789 09,338 19) (R10 9)   14  History of acute cholecystitis (V12 79) (Z87 19)   15  History of diarrhea (V12 79) (Z87 898)   16  History of heartburn (V12 79) (Z87 898)   17  Hypertension (401 9) (I10)   18  Hypertriglyceridemia (272 1) (E78 1)   19   Left sided abdominal pain (789 09) (R10 9)   20  Liver disease (573 9) (K76 9)   21  Nausea & vomiting (787 01) (R11 2)   22  Right foot pain (729 5) (M79 671)    Past Medical History    1  History of Fecal incontinence (787 60) (R15 9)   2  History of osteomyelitis (V13 59) (Z87 39)    Surgical History    1  History of Cholecystectomy Laparoscopic   2  History of Percutaneous Cholecystostomy   3  History of Surgery Right Foot Amputation DIP Third Toe    Family History  Mother    1  Denied: Family history of Colon cancer   2  Denied: Family history of Crohn's disease without complication, unspecified   gastrointestinal tract location   3  Denied: Family history of liver disease   4  Family history of lung cancer (V16 1) (Z80 1)  Father    5  Denied: Family history of Colon cancer   6  Denied: Family history of Crohn's disease without complication, unspecified   gastrointestinal tract location   7  Fam hx-ischem heart disease (V17 3) (Z82 49)   8  Denied: Family history of liver disease  Sister    5  Family history of multiple myeloma (V16 7) (Z80 7)   10  FHx: breast cancer (V16 3) (Z80 3)  Family History    11  Family history of kidney stone (V18 69) (Z84 1)    Social History    · Currently working   · Daily caffeine consumption, 2-3 servings a day   ·    · Never a smoker   · No alcohol use    Current Meds    1  ProAir  (90 Base) MCG/ACT Inhalation Aerosol Solution; INHALE 1 PUFFS Every   6 hours PRN SOB; Therapy: 65Kqb5403 to (Last Rx:12Apr2016)  Requested for: 49Vvt4020 Ordered    2  Accu-Chek Zayda Plus In Vitro Strip; TEST UP TO 3 TIMES DAILY; Therapy: 54VUH8225 to (Last Rx:17Jan2017)  Requested for: 73SXA6063 Ordered   3  Accu-Chek FastClix Lancets Miscellaneous; TEST up to 3 TIMES DAILY OR AS   DIRECTED; Therapy: 57KKB7142 to (Last Rx:17Jan2017)  Requested for: 15HFH9624 Ordered   4  Lovastatin 20 MG Oral Tablet; TAKE 1 TABLET DAILY AS DIRECTED;    Therapy: 90Ifh9455 to (Evaluate:09Oct2016)  Requested for: 97PVS0965; Last   Rx:12Apr2016 Ordered   5  NovoLIN 70/30 (70-30) 100 UNIT/ML Subcutaneous Suspension; administer 30 units in   am and 30 in pm - OR AS DIRECTED; Therapy: 60ZJK5619 to (Last Rx:11Jan2017)  Requested for: 26KWA3895 Ordered    6  Diphenoxylate-Atropine 2 5-0 025 MG Oral Tablet; Take 1-2 tablets daily as needed; Therapy: 15HEZ4256 to (Evaluate:16May2017)  Requested for: 60THD8503; Last   Rx:12Swr2160 Ordered    7  Omeprazole 10 MG Oral Capsule Delayed Release; TAKE 1 CAPSULE ONCE A DAY; Therapy: 17TYV2619 to (Last Rx:11Jan2017)  Requested for: 41TKT7228 Ordered    8  AmLODIPine Besylate 5 MG Oral Tablet; take 1 tablet by mouth every day; Therapy: 61WWX0311 to (Nisha Engel)  Requested for: 70RAA1429; Last   Rx:04Nov2016 Ordered   9  Carvedilol 3 125 MG Oral Tablet; take 1 tablet once a day; Therapy: 97CTR7917 to (Evaluate:04Jun2017)  Requested for: 59ZFR4686; Last   Rx:93Ijn7890 Ordered    10  Cephalexin 500 MG Oral Capsule; TAKE 1 CAPSULE EVERY 8 HOURS UNTIL GONE;    Therapy: 30XDN4919 to (Evaluate:18Jan2017)  Requested for: 53CWE4977; Last    Rx:11Jan2017 Ordered    Allergies    1  No Known Drug Allergies    Health Management   (1) ALPHA-FETOPROTEIN WITH AFP-L3%; every 6 months; Last 44Zsa7072; Next Due: 77Sek2210; Overdue    End of Encounter Meds    1  ProAir  (90 Base) MCG/ACT Inhalation Aerosol Solution; INHALE 1 PUFFS Every   6 hours PRN SOB; Therapy: 12Apr2016 to (Last Rx:12Apr2016)  Requested for: 12Apr2016 Ordered    2  Accu-Chek Zayda Plus In Vitro Strip; TEST UP TO 3 TIMES DAILY; Therapy: 87IKD3317 to (Last Rx:17Jan2017)  Requested for: 58AAP5953 Ordered   3  Accu-Chek FastClix Lancets Miscellaneous; TEST up to 3 TIMES DAILY OR AS   DIRECTED; Therapy: 21WDQ6749 to (Last Rx:17Jan2017)  Requested for: 12QIX7981 Ordered   4  Lovastatin 20 MG Oral Tablet; TAKE 1 TABLET DAILY AS DIRECTED;    Therapy: 12Apr2016 to (Simone Oh)  Requested for: 12Apr2016; Last Rx: 51WOX6139 Ordered   5  NovoLIN 70/30 (70-30) 100 UNIT/ML Subcutaneous Suspension; administer 30 units in   am and 30 in pm - OR AS DIRECTED; Therapy: 17TGF9457 to (Last Rx:11Jan2017)  Requested for: 17FVK6281 Ordered    6  Diphenoxylate-Atropine 2 5-0 025 MG Oral Tablet; Take 1-2 tablets daily as needed; Therapy: 14LRS4634 to (Evaluate:16May2017)  Requested for: 04ATU6214; Last   Rx:34Eor1575 Ordered    7  Omeprazole 10 MG Oral Capsule Delayed Release; TAKE 1 CAPSULE ONCE A DAY; Therapy: 05DRM7217 to (Last Rx:11Jan2017)  Requested for: 57MOO3514 Ordered    8  AmLODIPine Besylate 5 MG Oral Tablet; take 1 tablet by mouth every day; Therapy: 30FPP3018 to (Bobbye Fruits)  Requested for: 21XQM7873; Last   Rx:04Nov2016 Ordered   9  Carvedilol 3 125 MG Oral Tablet; take 1 tablet once a day; Therapy: 69AOJ0046 to (Evaluate:04Jun2017)  Requested for: 63YKZ5785; Last   Rx:04Cav2433 Ordered    10  Cephalexin 500 MG Oral Capsule; TAKE 1 CAPSULE EVERY 8 HOURS UNTIL GONE;    Therapy: 01WPQ2780 to (Evaluate:18Jan2017)  Requested for: 51PQX0907; Last    Rx:11Jan2017 Ordered    Patient Care Team    Care Team Member Role Specialty Office Number   Cristiana Emiliano PRITCHARD   Specialist Gastroenterology Adult [phone]     Signatures   Electronically signed by : Deana Guidry RN; Apr 21 2017  3:41PM EST                       (Author)    Electronically signed by : Deana Guidry RN; Apr 21 2017  3:41PM EST                       (Author)    Electronically signed by : Deana Guidry RN; Apr 21 2017  3:46PM EST                       (Author)

## 2018-01-14 VITALS
HEIGHT: 66 IN | OXYGEN SATURATION: 98 % | RESPIRATION RATE: 18 BRPM | DIASTOLIC BLOOD PRESSURE: 84 MMHG | TEMPERATURE: 97.9 F | HEART RATE: 92 BPM | BODY MASS INDEX: 38.59 KG/M2 | WEIGHT: 240.13 LBS | SYSTOLIC BLOOD PRESSURE: 154 MMHG

## 2018-01-15 NOTE — MISCELLANEOUS
Assessment    1  Diabetes mellitus type 2, controlled (250 00) (E11 9)   2  Diabetic polyneuropathy associated with type 2 diabetes mellitus (250 60,357 2)   (E11 42)   3  History of diarrhea (V12 79) (Z87 898)   4  Hypertension (401 9) (I10)   5  Balanitis (607 1) (N48 1)   6  History of Surgery Right Foot Amputation DIP Third Toe   7  History of osteomyelitis (V13 59) (Z87 39)    Plan  Balanitis    · Fluconazole 150 MG Oral Tablet; TAKE 1 TABLET 1 TIME ONLY - DAY AFTER  ANTIBIOTIC COMPLETED   Rx By: Estefania Jules; Dispense: 1 Days ; #:1 Tablet; Refill: 0; For: Balanitis; ARIN = N; Verified Transmission to [facility]/PHARMACY #0259 Msg to Pharmacy: *DO NOT TAKE LOVASTATIN ON DAY TAKING FLUCONAZOLE*; Last Updated By: System, SureScripts; 2016 4:13:36 PM  Diabetes mellitus type 2, controlled, Diabetic polyneuropathy associated with type 2  diabetes mellitus    · (1) HEMOGLOBIN A1C; Status:Active; Requested [id number];    Perform:Quest; PHZ:[id number]; Ordered; For:Diabetes mellitus type 2, controlled, Diabetic polyneuropathy associated with type 2 diabetes mellitus; Ordered By:Raj Auguste; History of diarrhea    · Diphenoxylate-Atropine 2 5-0 025 MG Oral Tablet; TAKE 1 TO 2 TABLETS BY  MOUTH EVERY DAY AS NEEDED   Rx By: Estefania Jules; Dispense: 30 Days ; #:60 Tablet; Refill: 1; For: History of diarrhea; ARIN = N; Print Rx  History of heartburn    · RaNITidine HCl - 150 MG Oral Tablet; TAKE ONE TABLET BY MOUTH ONCE  DAILY FOR ACID REFLUX   Rx By: Estefania Jules; Dispense: 90 Days ; #:90 Tablet; Refill: 1; For: History of heartburn; ARIN = N; Verified Transmission to [facility]/PHARMACY #5168 Last Updated By: System, SureScripts; 2016 4:13:30 PM  Hypertension    · Lisinopril 40 MG Oral Tablet   Rx By: Estefania Jules; Dispense: 90 Days ; #:90 Tablet;  Refill: 1; For: Hypertension; ARIN = N; Sent To: [facility]/PHARMACY #1782  · AmLODIPine Besylate 5 MG Oral Tablet; take 1 tablet by mouth every day   Rx By: Estefania Jules; Dispense: 90 Days ; #:90 Tablet; Refill: 1; For: Hypertension; ARIN = N; Verified Transmission to [facility]/PHARMACY #3317 Last Updated By: System, SureScripts; 11/4/2016 4:14:54 PM    Discussion/Summary  Discussion Summary:   1  increase gabapentin to 300mg in morning and 600mg in evening for 5 days, then increase dose to 600mg twice a day  2  take diflucan dose after antibiotic complete (DO NOT TAKE LOVASTATIN FOR THAT DAY)  3  continue checking blood sugars  4  medications refilled  5  blood work in Jan 2017 and return to office after blood work  Counseling Documentation With Imm: The patient, patient's family was counseled regarding instructions for management, patient and family education, impressions, importance of compliance with treatment  Medication SE Review and Pt Understands Tx: Possible side effects of new medications were reviewed with the patient/guardian today  The treatment plan was reviewed with the patient/guardian  The patient/guardian understands and agrees with the treatment plan      History of Present Illness  TCM Communication [facility]: The patient is being contacted for follow-up after hospitalization and 11/4/16 @3:30pm  He was hospitalized at [facility]  The dates of hospitalization: 10/24/16-10/29/16  Diagnosis: osteomyelitis of right foot  He was discharged to home  Medications reviewed and updated today  He scheduled a follow up appointment  Follow-up appointments with other specialists: Podiatrist 11/3/16  pain in right foot Counseling was provided to patient's family  Pt wife     Communication performed and completed by HENRY 10/31/16   HPI: 63 y/o M here with wife for follow up visit    as above - reviewed meds with patient during appt  BS improved, invokana stopped and insulin dose increased to 30 units BID  checking in AM: 120s and before dinner: 130s    had 3rd toe amputated for osteomyelitis, nail ripped off and pt did pay attention to toe  foot became swollen and tender, pt went to [facility] who prescribed oral abx which did not help  pt admitted for IV abx and surgery with podiatrist  pain improved and swelling improving  taking tramadol as needed for pain   saw podiatry 3 days ago in office    still having diarrhea - requests refill of lomotil - takes 1-2 tablets per day on avg  c/o thick, curdish like material on tip of penis since taking IV/oral abx  no penile redness or pain or trouble urinating    not taking abx as prescribed but taking cephalexin 1 capsule per day for last 6 days  denies any other complaints      Review of Systems  Complete-Male:   Constitutional: no fever  Cardiovascular: no chest pain  Respiratory: no shortness of breath  Gastrointestinal: diarrhea, but no abdominal pain  Genitourinary: penile reddening, but no dysuria, no penile lesion, no penile discharge, no penile pain and no penile swelling  Musculoskeletal: no arthralgias and no myalgias  Integumentary: no rashes  Neurological: no confusion  Psychiatric: no depression  ROS Reviewed:   ROS reviewed  Active Problems    1  Abnormal INR (790 92) (R79 1)   2  Acute upper respiratory infection (465 9) (J06 9)   3  Bacteremia due to Klebsiella pneumoniae (790 7,041 3) (R78 81)   4  Cholecystostomy care (V55 4) (Z43 4)   5  Cirrhosis (571 5) (K74 60)   6  Dermatitis (692 9) (L30 9)   7  Diabetes mellitus type 2, controlled (250 00) (E11 9)   8  Diabetic polyneuropathy associated with type 2 diabetes mellitus (250 60,357 2)   (E11 42)   9  Dizziness (780 4) (R42)   10  Dry skin (701 1) (L85 3)   11  Elevated serum creatinine (790 99) (R79 89)   12  Flank pain, acute (789 09,338 19) (R10 9)   13  History of acute cholecystitis (V12 79) (Z87 19)   14  History of diarrhea (V12 79) (Z87 898)   15  History of heartburn (V12 79) (Z87 898)   16  Hypertension (401 9) (I10)   17  Hypertriglyceridemia (272 1) (E78 1)   18  Left sided abdominal pain (789 09) (R10 9)   19  Liver disease (573 9) (K76 9)   20   Nausea & vomiting (787 01) (R11 2)    Past Medical History    1  History of Fecal incontinence (787 60) (R15 9)   2  History of osteomyelitis (V13 59) (Z87 39)    Surgical History    1  History of Cholecystectomy Laparoscopic   2  History of Percutaneous Cholecystostomy   3  History of Surgery Right Foot Amputation DIP Third Toe  Surgical History Reviewed: The surgical history was reviewed and updated today  Family History  Mother    1  Denied: Family history of Colon cancer   2  Denied: Family history of Crohn's disease without complication, unspecified   gastrointestinal tract location   3  Denied: Family history of liver disease   4  Family history of lung cancer (V16 1) (Z80 1)  Father    5  Denied: Family history of Colon cancer   6  Denied: Family history of Crohn's disease without complication, unspecified   gastrointestinal tract location   7  Fam hx-ischem heart disease (V17 3) (Z82 49)   8  Denied: Family history of liver disease  Sister    5  Family history of multiple myeloma (V16 7) (Z80 7)   10  FHx: breast cancer (V16 3) (Z80 3)  Family History    11  Family history of kidney stone (V18 69) (Z84 1)    Social History    · Currently working   · Daily caffeine consumption, 2-3 servings a day   ·    · Never a smoker   · No alcohol use  Social History Reviewed: The social history was reviewed and updated today  Current Meds   1  Carvedilol 3 125 MG Oral Tablet; take 1 tablet once a day; Therapy: 77YBA8026 to (Evaluate:10Jan2017)  Requested for: 27Pzj9578; Last   Rx:55Hbn2631 Ordered   2  Diphenoxylate-Atropine 2 5-0 025 MG Oral Tablet; TAKE 1 TO 2 TABLETS BY MOUTH   EVERY DAY AS NEEDED; Therapy: 44XEC9568 to (Evaluate:12Oct2016)  Requested for: 27Voj2021; Last   Rx:48Xfv1439 Ordered   3  Lisinopril 40 MG Oral Tablet; TAKE 1 TABLET DAILY AS DIRECTED; Therapy: 93SQF7327 to (Evaluate:22Jan2017)  Requested for: 83Cyz2825; Last   Rx:85Iqm9781 Ordered   4   Lovastatin 20 MG Oral Tablet; TAKE 1 TABLET DAILY AS DIRECTED; Therapy: 04Qej5679 to (Hettie Coil)  Requested for: 12Apr2016; Last   Rx:47Swt6311 Ordered   5  NovoLIN 70/30 (70-30) 100 UNIT/ML Subcutaneous Suspension; administer 24 units in   am and 24 in pm - OR AS DIRECTED; Therapy: 85CCF9679 to (Last Rx:70Oms3174)  Requested for: 86Jfb0923 Ordered   6  ProAir  (90 Base) MCG/ACT Inhalation Aerosol Solution; INHALE 1 PUFFS Every   6 hours PRN SOB; Therapy: 85Sqy2782 to (Last Rx:29Hvu8162)  Requested for: 78Apb0541 Ordered   7  RaNITidine HCl - 150 MG Oral Tablet; TAKE ONE TABLET BY MOUTH ONCE DAILY; Therapy: 98Cpc3810 to (Evaluate:24Oct2016)  Requested for: 55Exo0032; Last   Rx:06Ljl7988; Status: ACTIVE - Renewal Denied Ordered  Medication List Reviewed: The medication list was reviewed and updated today  Allergies    1  No Known Drug Allergies    Vitals  Signs   Recorded: 24GMR8286 00:33BJ   Systolic: 532  Diastolic: 78  Heart Rate: 70  Respiration: 18  Temperature: 98 6 F  O2 Saturation: 98  Height: 5 ft 6 in  Weight: 234 lb   BMI Calculated: 37 77  BSA Calculated: 2 15    Physical Exam    Constitutional   General appearance: No acute distress, well appearing and well nourished  Eyes   Pupils and irises: Equal, round, reactive to light  Ears, Nose, Mouth, and Throat   Oropharynx: Normal with no erythema, edema, exudate or lesions  Pulmonary   Respiratory effort: No increased work of breathing or signs of respiratory distress  Auscultation of lungs: Clear to auscultation  Cardiovascular   Auscultation of heart: Normal rate and rhythm, normal S1 and S2, no murmurs  Examination of extremities for edema and/or varicosities: Normal   no edema  surgical shoe with dressing on right foot  Abdomen   Abdomen: Non-tender, no masses  The abdomen was obese  Genitourinary limited exam 2nd to pt preference: evidence of balanitis but no penile erythema, tenderness or swelling     Psychiatric   Judgment and insight: Normal     Mood and affect: Normal        Results/Data  (1) TISSUE EXAM 28Oct2016 08:19AM Janessa Sheppard     Test Name Result Flag Reference   LAB AP CASE REPORT (Report)     Surgical Pathology Report             Case: [id number]                   Authorizing Provider: Mary Wallace DPM  Collected:      10/28/2016 1601        Ordering Location:   [facility]    Received:      10/28/2016 [address] Operating Room                            Pathologist:      Shannon Khoury MD                              Specimen:  Toe, Right, 3rd right toe   LAB AP FINAL DIAGNOSIS (Report)     A  Right third toe:    - Chronic ulcer of skin with necrosis  - Mild chronic osteomyelitis of underlying bone  - Viable and non-inflamed skin and subcutaneous tissues present at   proximal margin     - Proximal bony margin is negative for osteomyelitis  Electronically signed by Shannon Khoury MD on 11/1/2016 at 12:44 PM   LAB AP SURGICAL ADDITIONAL INFORMATION (Report)     These tests were developed and their performance characteristics   determined by [facility] or [facility]  They may not be cleared or approved by the [place]  Food and   Drug Administration  The FDA has determined that such clearance or   approval is not necessary  These tests are used for clinical purposes  They should not be regarded as investigational or for research  This   laboratory has been approved by [place] 88, designated as a high-complexity   laboratory and is qualified to perform these tests  - Interpretation performed at [facility]   LAB AP GROSS DESCRIPTION (Report)     A  The specimen is received in formalin, labeled with the patient's name   and hospital number, and is designated 3rd right toe   The specimen   consists of a digit amputation which measures 3 1 cm in length from the   distal digit tip to the most proximal soft tissue resection margin and 3 3   cm in length from the distal digit tip to the most proximal bone resection   margin  Soft tissue and bone resected surfaces are inked blue  The skin is   white-tan, focally macerated and the distal aspect exhibits an ulcerated   pale tan-brown focus, measuring up to 1 8 cm in greatest dimensions which   is located 1 7 cm from the nearest proximal soft tissue resected margin  Representative sections are submitted as follows:  1: Focus including nearest soft tissue margin  2: Shave section, proximal bone margin submitted in Decal II prior to   permanent section  Radha Blend 3: Bone underlying focus submitted in Decal II prior to permanent section  Note: The estimated total formalin fixation time based upon information   provided by the submitting clinician and the standard processing schedule   is 36 25 hours  [facility]     Provider Comments  Provider Comments:   emphasized importance of treatment compliance and returning for OV/appts    pt stressed lately but feeling better now and verbalized understanding    advised on importance of treating liver findings per Dr Som Black including with weight loss to reduce change of developing cirrohsis in future, pt's wife emphasized the same during appt to patient      Health Management  Cirrhosis   (1) ALPHA-FETOPROTEIN WITH AFP-L3%; every 6 months; Last 15Gbu7730; Next Due: 35Hfk9009;   Active    Future Appointments    Date/Time Provider Specialty Site   01/11/2017 05:30 PM Lupe Grimes DO Internal Medicine [facility] INTERNAL MED     Signatures   Electronically signed by : Kenna Luciano DO; Nov 4 2016  4:44PM EST                       (Author)

## 2018-01-15 NOTE — RESULT NOTES
Verified Results  (1) HEMOGLOBIN A1C 06Ier8118 12:00AM Alan Lama     Test Name Result Flag Reference   HEMOGLOBIN A1C 7 2     EST  AVG  GLUCOSE 160        Summary / No summary entered :      No summary entered   Documents attached :      [address], [place]; Enc: [id number] - Image Encounter - Verl Specter - (Internal Medicine) (Additional Information Document)    Plan  Bacteremia due to Klebsiella pneumoniae, Cholecystostomy care, History of acute  cholecystitis    · (1) CBC/PLT/DIFF; Status:Active; Requested AMM:89OCI2722;   Cholecystostomy care, History of acute cholecystitis    · (1) COMPREHENSIVE METABOLIC PANEL; Status:Active;  Requested SVH:77NJK8898;   PMH: Fecal incontinence    · Diphenoxylate-Atropine 2 5-0 025 MG Oral Tablet; TAKE 1-2 TABLETS DAILY AS  NEEDED

## 2018-01-15 NOTE — RESULT NOTES
Verified Results  (1) TISSUE EXAM [id number] 08:19AM Anisha Valentino     Test Name Result Flag Reference   LAB AP CASE REPORT (Report)     Surgical Pathology Report             Case: [id number]                   Authorizing Provider: Lety Stallworth DPM  Collected:      10/28/2016 5907        Ordering Location:   [facility]    Received:      10/28/2016 [address] [place] [address] Operating Room                            Pathologist:      Evonne Briseno MD                              Specimen:  Toe, Right, 3rd right toe   LAB AP FINAL DIAGNOSIS (Report)     A  Right third toe:    - Chronic ulcer of skin with necrosis  - Mild chronic osteomyelitis of underlying bone  - Viable and non-inflamed skin and subcutaneous tissues present at   proximal margin     - Proximal bony margin is negative for osteomyelitis  Electronically signed by Evonne Briseno MD on 11/1/2016 at 12:44 PM   LAB AP SURGICAL ADDITIONAL INFORMATION (Report)     These tests were developed and their performance characteristics   determined by [facility] or [employer]  They may not be cleared or approved by the [place]  Food and   Drug Administration  The FDA has determined that such clearance or   approval is not necessary  These tests are used for clinical purposes  They should not be regarded as investigational or for research  This   laboratory has been approved by CLIA 88, designated as a high-complexity   laboratory and is qualified to perform these tests  - Interpretation performed at [facility]   LAB AP GROSS DESCRIPTION (Report)     A  The specimen is received in formalin, labeled with the patient's name   and hospital number, and is designated 3rd right toe   The specimen   consists of a digit amputation which measures 3 1 cm in length from the   distal digit tip to the most proximal soft tissue resection margin and 3 3   cm in length from the distal digit tip to the most proximal bone resection   margin  Soft tissue and bone resected surfaces are inked blue  The skin is   white-tan, focally macerated and the distal aspect exhibits an ulcerated   pale tan-brown focus, measuring up to 1 8 cm in greatest dimensions which   is located 1 7 cm from the nearest proximal soft tissue resected margin  Representative sections are submitted as follows:  1: Focus including nearest soft tissue margin  2: Shave section, proximal bone margin submitted in Decal II prior to   permanent section  Maurice Ernandez 3: Bone underlying focus submitted in Decal II prior to permanent section  Note: The estimated total formalin fixation time based upon information   provided by the submitting clinician and the standard processing schedule   is 36 25 hours      Jaye Griffin

## 2018-01-16 NOTE — RESULT NOTES
Message    All the lab work to check for the reasons of cirrhosis came back as negative  Follow-up office visit in couple of months  Repeat alpha-fetoprotein and ultrasound every 6 months      Patient was scheduled for upper endoscopy  LMOM for pt to call the office for results/reminders set  CF         Verified Results  (1) SOLEDAD SCREEN W/REFLEX TO TITER/PATTERN [id number] 09:35 [employer]now [employer]     Test Name Result Flag Reference   ANACHOICE(R) SCREEN NEGATIVE  NEGATIVE   A negative ANAchoice(R) indicates the absence of  detectable antibodies to component analytes  consisting of dsDNA, Chromatin, RNP, Sm/RNP, Sm, SSA,   SSB, Annie-1, Centromere B, Scl-70 and Ribosomal P  A negative ANAchoice(R) should be interpreted in the  context of the clinical and laboratory findings, and  does not rule out autoimmune disease characterized by   other autoantibody specificities including autoimmune   hepatitis and primary biliary cirrhosis  (1) PT WITH INR [id number] 09:[employer]     Test Name Result Flag Reference   INR 1 0     Reference Range                     0 9-1 1  Moderate-intensity Warfarin Therapy 2 0-3 0  Higher-intensity Warfarin Therapy   3 0-4 0   PT 10 5 sec  9 0-11 5   For more information on this test, go to:  [email or web address]/[email or web address]     (1) IRON SATURATION %, TIBC [id number] 09:35AM Jacquelyn Marmolejo, [facility]     Test Name Result Flag Reference   IRON, TOTAL 79 mcg/dL     IRON BINDING CAPACITY 279 mcg/dL (calc)  250-425   % SATURATION 28 % (calc)  15-60     (Q) ALPHA-FETOPROTEIN (AFP) AND AFP-L3 [id number] 09:35 [employer]     Test Name Result Flag Reference   AFP 0 5 ng/mL L 1 6-4 5   AFP-L3 SEE NOTE %  0 5-9 9   NO VALUE DETERMINED     The micro-total analysis system ([employer]) employs microchip  capillary electrophoresis to quantitatively measure AFP and  AFP-L3% by immunochemical techniques   The assay principle  involves DNA-coupled antibodies and dye labeled antibodies,  which react with proteins in liquid phase within the  microchannels  Both analytes are quantified using  laser-induced fluorescence  Instrument and associated  reagents are supplied by [address] [place] [address]  Patients with elevated AFP-L3% values (>=10%) have been  shown to have an increased risk of developing hepatocellular  carcinoma ([facility] [address] )  In a selected group of patients, the risk  of developing HCC was 48 8% with an elevated AFP-L3% and was  7 0% with a negative AFP-L3% result  Limitations of Procedure:     1  The AFP-L3% value is not calculated when the AFP-L3  concentration is below 0 3 ng/mL  In such cases the AFP-L3%  result field will indicate "NO VALUE DETERMINED" 2   Heterophilic antibodies in human serum can react with the  immunoglobulins included in the assay components causing  interference with in vitro immunoassays  Samples from  patients routinely exposed to animals or animal serum  products can demonstrate this type of interference and can  potentially cause an anomalous result  The [facility]  has been formulated to minimize the risk of the  interference; however, potential interactions between rare  sera and ingredients can occur  3  For diagnostic purposes,  the results obtained from this assay should always be used  and interpreted in conjunction with clinical examination,  patient medical history, and other findings  4  Pregnancy  can cause high values of AFP-L3% and AFP is not  interpretable in pregnant females  5  AFP producing tumors  other than HCC can show high values of AFP-L3% and AFP  6   Samples from patients having acute hepatitis and fulminant  hepatitis can show high values of AFP-L3% and AFP  7  It is  recommended that this assay be used in conjunction with  imaging studies for clinical diagnosis   8  Liver diseases  caused by other etiologies such as alcoholic liver disease,  hemachromatosis, Shukri's disease, autoimmune hepatitis and  steatohepatisis have not been studied with the assay  9  The  assay is linear for AFP concentration of 0 3 to 1000 ng/mL  10  Values obtained with different assay methods or kits  cannot be used interchangeably       (Q) MITOCHONDRIAL ANTIBODY W/REFL TITER [id number] 09:35AM Abimb[facility] Corry     Test Name Result Flag Reference   MITOCHONDRIAL AB SCREEN NEGATIVE  NEGATIVE     (Q) HEPATITIS PANEL [id number] 09:35AM [facility] Corry     Test Name Result Flag Reference   HEPATITIS A AB, TOTAL REACTIVE A NON-REACTIVE   HEPATITIS B SURFACE$ANTIBODY QL NON-REACTIVE  NON-REACTIVE   HEPATITIS B SURFACE$ANTIGEN NON-REACTIVE  NON-REACTIVE   HEPATITIS B CORE AB TOTAL NON-REACTIVE  NON-REACTIVE   HEPATITIS C ANTIBODY NON-REACTIVE  NON-REACTIVE   SIGNAL TO CUT-OFF 0 03  <1 00     (1) OP RAMAN FERRITIN [id number] 09:35AM Adele Thomas   REPORT COMMENT:  FASTING:YES     Test Name Result Flag Reference   FERRITIN 141 ng/mL

## 2018-01-16 NOTE — RESULT NOTES
Verified Results  (1) HEMOGLOBIN A1C 25Oct2016 12:00AM Sacha Galarza     Test Name Result Flag Reference   HEMOGLOBIN A1C 8 7     EST  AVG   GLUCOSE 203

## 2018-01-16 NOTE — RESULT NOTES
Message   blood test results are back and HGA1C is still elevated at 8 9%  Is he taking 30 units twice a day of 70/30 insulin? Any missed doses of insulin? if yes, how many missed per week? Jamar Belle needs to make sure his home blood sugar meter is working correctly, should purchase 'control' solution for his meter at pharmacy which will verify its accuracy  then, please have [facility] schedule an appt 1 week later with at least twice a day(morning and before dinner OR 2 hrs after dinner) blood sugar readings to bring to office for review  let me know if questions     Verified Results  (Q) HEMOGLOBIN A1c [id number] 10:24AM Hankins Susanne     Test Name Result Flag Reference   HEMOGLOBIN A1c 8 9 % of total Hgb H <5 7   According to ADA guidelines, hemoglobin A1c <7 0%  represents optimal control in non-pregnant diabetic  patients  Different metrics may apply to specific  patient populations  Standards of Medical Care in    Diabetes Care  2013;36:s11-s66     For the purpose of screening for the presence of  diabetes  <5 7%       Consistent with the absence of diabetes  5 7-6 4%    Consistent with increased risk for diabetes              (prediabetes)  >or=6 5%    Consistent with diabetes     This assay result is consistent with diabetes  mellitus  Currently, no consensus exists for use of hemoglobin  A1c for diagnosis of diabetes for children

## 2018-01-16 NOTE — PROGRESS NOTES
Assessment    1  Encounter for preventive health examination (V70 0) (Z00 00)   2  Cholecystostomy care (V55 4) (Z43 4)   3  History of acute cholecystitis (V12 79) (Z87 19)   4  Bacteremia due to Klebsiella pneumoniae (790 7,041 3) (R78 81,B96 1)   5  Diabetes mellitus type 2, controlled (250 00) (E11 9)   6  Hypertriglyceridemia (272 1) (E78 1)   7  Hypertension (401 9) (I10)   8  Diabetic polyneuropathy associated with type 2 diabetes mellitus (250 60,357 2)   (E11 42)   9  History of Percutaneous Cholecystostomy    Plan  Bacteremia due to Klebsiella pneumoniae, Cholecystostomy care, History of acute  cholecystitis    · (1) CBC/PLT/DIFF; Status:Active; Requested [id number];   Cholecystostomy care, History of acute cholecystitis    · 2 - Vero ROMERO, Yemi Ashby  (General Surgery) Physician Referral  Consult - 53 y/o male, hx of  cholecystostomy tube 2nd to acute cholecystitis  Status: Hold For - Scheduling   Requested for: [id number]  Care Summary provided  : Yes   · (1) COMPREHENSIVE METABOLIC PANEL; Status:Active; Requested [id number];   PMH: Fecal incontinence    · Diphenoxylate-Atropine 2 5-0 025 MG Oral Tablet; TAKE 1-2 TABLETS DAILY AS  NEEDED    Discussion/Summary  Discussion Summary:   1  decrease lisinopril to 1/2 tablet (20mg) daily  2  see surgeon in office  3  if symptoms worsen or new symptoms develop - go to Emergency Room  4  return in 2-3 weeks or sooner if questions  Counseling Documentation With Imm: The patient, patient's family was counseled regarding instructions for management, patient and family education, impressions, risks and benefits of treatment options  Medication SE Review and Pt Understands Tx: The treatment plan was reviewed with the patient/guardian   The patient/guardian understands and agrees with the treatment plan      Chief Complaint  Chief Complaint Free Text Note Form: New patient establish      History of Present Illness  HPI: 53 y/o male new to practice here to establish care    was admitted to [address] [place] [address] end of Dec 2015 for abd pain - found to have cholecystitis with klebsiella pneumonia bacteremia - s/p cholecystostomy tube placement ~12/29/15  treated with IV and PO abx - off abx at this time    reports prior hx of poorly controlled diabetes before december hospital admission, lsot 40 lbs in last 1 mo with diet and lifestyle change    saw prior PCP 1 week ago, switched to this office 2nd to insurance change  here with wife, present during appointment  reports BS controlled 110 in AM and 120s at bedtime    c/o decrease output from cholecystostomy tube  was draining nearly 300ml per day for last ~4 weeks but last 3 days only drained <50ml per day  no new abd pain, fever, N/V or loss of appetite  no f/u with IR at this time  scheduled to see gen surgeon 2/3/16    no other complaints      Review of Systems  Complete-Male:   Constitutional: no fever, not feeling poorly and no chills  Cardiovascular: no chest pain  Respiratory: no shortness of breath  Gastrointestinal: no abdominal pain, no nausea, no vomiting and no diarrhea  Genitourinary: no trouble urinating  ROS Reviewed:   ROS reviewed  Past Medical History    1  History of Fecal incontinence (787 60) (R15 9)    Surgical History    1  History of Percutaneous Cholecystostomy    Family History    1  Family history of lung cancer (V16 1) (Z80 1)    2  Fam hx-ischem heart disease (V17 3) (Z82 49)    3  Family history of multiple myeloma (V16 7) (Z80 7)   4  FHx: breast cancer (V16 3) (Z80 3)    Social History    · Never a smoker    Current Meds   1  Carvedilol 3 125 MG Oral Tablet; 1 po daily; Therapy: 78GPQ8913 to Recorded   2  Fenofibrate 145 MG Oral Tablet; TAKE 1 TABLET DAILY; Therapy: 34FQQ9867 to Recorded   3  Gabapentin 600 MG Oral Tablet; TAKE 1 TABLET AT BEDTIME; Therapy: 40CWP2204 to Recorded   4   HumuLIN 70/30 (70-30) 100 UNIT/ML Subcutaneous Suspension; 20 units in am and 20   in pm;   Therapy: 18QKC9432 to Recorded   5  Invokana 300 MG Oral Tablet; 1 po daily; Therapy: 25QUA4225 to Recorded   6  Lisinopril 40 MG Oral Tablet; TAKE 1 TABLET DAILY AS DIRECTED; Therapy: 49PGP2422 to Recorded    Allergies    1  No Known Drug Allergies    Vitals  Signs [Data Includes: Current Encounter]   Recorded: 90BYK7381 04:58PM   Heart Rate: 68  Respiration: 16  Systolic: 900  Diastolic: 62  Height: 5 ft 6 in  Weight: 216 lb   BMI Calculated: 34 86  BSA Calculated: 2 08    Physical Exam    Constitutional   General appearance: No acute distress, well appearing and well nourished  Eyes   Pupils and irises: Equal, round, reactive to light  Ears, Nose, Mouth, and Throat   Otoscopic examination: Tympanic membranes translucent with normal light reflex  Canals patent without erythema  Oropharynx: Normal with no erythema, edema, exudate or lesions  Pulmonary   Respiratory effort: No increased work of breathing or signs of respiratory distress  Auscultation of lungs: Clear to auscultation  Cardiovascular   Auscultation of heart: Normal rate and rhythm, normal S1 and S2, no murmurs  Examination of extremities for edema and/or varicosities: Normal   no edema  Abdomen   Abdomen: Abnormal   The abdomen was obese  Bowel sounds were normal  Skin findings: a drain  A cholecystostomy drain was located in the RUQ  There was bilious drainage noted  The surrounding skin was erythematous  The abdomen was soft and nontender  The abdomen was not rigid  No guarding  tube site with mild erythema but no drainage  Psychiatric   Judgment and insight: Normal     Mood and affect: Abnormal   Mood and Affect: appropriate affect and anxious        Provider Comments  Provider Comments:   pt reports home BPs in 380-979M systolic    called and spoke wot [facility] dept - RN for on-call Dr Booker(completing a procedure currently)  advised for PCP to call [facility] de[facility] in AM tmrw to 'fit' patient in schedule for cholecystostomy tube check ([phone])  called and LM with [facility] dept now for call back in AM, will call again in AM tmrw  Patient without symptoms except decrease in tube output - advised should symptoms develop, go to ER immediately    f/u 2-3 weeks or prn      Future Appointments    Date/Time Provider Specialty Site   02/17/2016 08:00 AM Glendy Burrows DO Internal Medicine [address]   Electronically signed by : Rafaela Pedroza DO; Jan 26 2016  6:48PM EST                       (Author)

## 2018-01-16 NOTE — RESULT NOTES
Message   let patient know: kidney blood test results are back and are now normal at 1 03   recommend to continue with current treatment plan     Verified Results  (1) BASIC METABOLIC PROFILE [id number] 10:22AM Luz Nj   REPORT COMMENT:  FASTING:NO     Test Name Result Flag Reference   GLUCOSE 188 mg/dL H 65-99   Fasting reference interval   UREA NITROGEN (BUN) 13 mg/dL  7-25   CREATININE 1 03 mg/dL  0 70-1 33   For patients >52years of age, the reference limit  for Creatinine is approximately 13% higher for people  identified as -American  eGFR NON-AFR   AMERICAN 81 mL/min/1 73m2  > OR = 60   eGFR AFRICAN AMERICAN 94 mL/min/1 73m2  > OR = 60   BUN/CREATININE RATIO   3-40   NOT APPLICABLE (calc)   SODIUM 140 mmol/L  135-146   POTASSIUM 3 9 mmol/L  3 5-5 3   CHLORIDE 104 mmol/L     CARBON DIOXIDE 24 mmol/L  19-30   CALCIUM 8 7 mg/dL  8 6-10 3

## 2018-01-17 NOTE — MISCELLANEOUS
Message  Pt wife called in today requesting pt be seen today with SX of vomiting, diarrhea and cough x2 days  When I asked pt wife to speak with pt she stated that pt was still at work until [address] [facility]  I then placed pt wife on hold and advised Dr Dotty Kamara that pt was still at work and wouldn't be able to make it into office until then  Dr Dotty Kamara then advised pt be seen at Urgent care if any worse  I then Advised pt wife and she said her  is very stubborn and will not go to ER  He has an appt tomorrow with Dr Dotty Kamara and will address this at that time  Dr Dotty Kamara notified  AV  --------------    reviewed note by MA and agree with recommendations    patient vomiting while at work with associated above symptoms  not able to get out of work till 5pm  advised Jerrica to tell patient/pt's calling representative to go to Urgent Care asap for further evaluation & treatment       Active Problems    1  Bacteremia due to Klebsiella pneumoniae (790 7,041 3) (R78 81)   2  Cholecystostomy care (V55 4) (Z43 4)   3  Dermatitis (692 9) (L30 9)   4  Diabetes mellitus type 2, controlled (250 00) (E11 9)   5  Diabetic polyneuropathy associated with type 2 diabetes mellitus (250 60,357 2)   (E11 42)   6  Dry skin (701 1) (L85 3)   7  Elevated serum creatinine (790 99) (R79 89)   8  History of acute cholecystitis (V12 79) (Z87 19)   9  Hypertension (401 9) (I10)   10  Hypertriglyceridemia (272 1) (E78 1)    Current Meds   1  Aquaphor External Ointment; APPLY LIBERALLY TO SKIN TWICE DAILY; Therapy: 86YYK2938 to (Last Rx:[id number])  Requested for: 01Eud8773 Ordered   2  Carvedilol 3 125 MG Oral Tablet; 1 po daily; Therapy: 75BNF0000 to Recorded   3  Diphenoxylate-Atropine 2 5-0 025 MG Oral Tablet; TAKE 1-2 TABLETS DAILY AS   NEEDED; Therapy: 10ENC3020 to (Last Rx:26Jan2016) Ordered   4  Fenofibrate 145 MG Oral Tablet; TAKE 1 TABLET DAILY; Therapy: 96FER6932 to (Evaluate:60Ijx3588)  Requested for: 01Apr2016; Last   Rx:01Apr2016 Ordered   5  Gabapentin 600 MG Oral Tablet; TAKE 1 TABLET AT BEDTIME; Therapy: 34TXT5569 to Recorded   6  HumuLIN 70/30 (70-30) 100 UNIT/ML Subcutaneous Suspension; 20 units in am and 20   in pm;   Therapy: 14WND7606 to Recorded   7  Invokana 300 MG Oral Tablet; 1 po daily; Therapy: 84XLJ9567 to Recorded   8  Lisinopril 40 MG Oral Tablet; TAKE 1 TABLET DAILY AS DIRECTED; Therapy: 99CCE7792 to Recorded    Allergies    1  No Known Drug Allergies    Signatures   Electronically signed by : Gisele Mariee DO;  Apr 11 2016  4:20PM EST                       (Author)

## 2018-01-17 NOTE — MISCELLANEOUS
Message   Recorded as Task   Date: 07/26/2016 11:16 AM, Created By: De Ledesma   Task Name: Go to Med   Assigned To: Raj Auguste   Regarding Patient: Jordan Pyle, Status: In Progress   Comment:    Libertad Cheng - 26 Jul 2016 11:16 AM     TASK CREATED  Patient would like diphenoxylate and ranitidine sent to [facility] on [facility] (located in chart)  Silverio Taylors - 26 Jul 2016 11:18 AM     TASK REASSIGNED: Previously Assigned To María 55 - 26 Jul 2016 11:32 AM     TASK REPLIED TO: Previously Assigned To Bettie Gould 10  pharmacy changed for pt's ranitidine per pt request       Susanne, can you cancel diphenoxylate prescription at [facility]   patient would like this rx called into his [facility](see chart)    patient requested refill of lisinopril 40mg per day today   does patient want lisinopril ordered to [facility] or [facility] pharmacy?     thanks   Hawa Crockett - 26 Jul 2016 1:56 PM     TASK IN PROGRESS   Hawa Crockett - 26 Jul 2016 1:57 PM     TASK EDITED  medication was canceled and called to [facility], pt wife states pt would like Lisinopril sent to [facility]   PLAN - medications sent to pt's [facility] pharmacy per pt request      Plan  Diabetes mellitus type 2, controlled    · From  HumuLIN 70/30 (70-30) 100 UNIT/ML Subcutaneous Suspension  administer 24 units in am and 24 in pm To HumuLIN 70/30  (70-30) 100 UNIT/ML Subcutaneous Suspension administer 24 units in am and 24 in pm  - OR AS DIRECTED  Hypertension    · Lisinopril 40 MG Oral Tablet; TAKE 1 TABLET DAILY AS DIRECTED    Signatures   Electronically signed by Bernard Holguin DO; Jul 26 2016  3:30PM EST                       (Author)

## 2018-03-03 ENCOUNTER — HOSPITAL ENCOUNTER (EMERGENCY)
Facility: HOSPITAL | Age: 58
Discharge: HOME | End: 2018-03-03
Admitting: EMERGENCY MEDICINE
Payer: COMMERCIAL

## 2018-03-03 ENCOUNTER — APPOINTMENT (EMERGENCY)
Dept: RADIOLOGY | Facility: HOSPITAL | Age: 58
End: 2018-03-03
Payer: COMMERCIAL

## 2018-03-03 VITALS
SYSTOLIC BLOOD PRESSURE: 188 MMHG | HEART RATE: 92 BPM | TEMPERATURE: 98.1 F | DIASTOLIC BLOOD PRESSURE: 86 MMHG | RESPIRATION RATE: 18 BRPM | OXYGEN SATURATION: 98 %

## 2018-03-03 DIAGNOSIS — M19.039 WRIST ARTHRITIS: Primary | ICD-10-CM

## 2018-03-03 PROCEDURE — 73110 X-RAY EXAM OF WRIST: CPT

## 2018-03-03 PROCEDURE — 99283 EMERGENCY DEPT VISIT LOW MDM: CPT

## 2018-03-03 RX ORDER — ACETAMINOPHEN 325 MG/1
650 TABLET ORAL ONCE
Status: COMPLETED | OUTPATIENT
Start: 2018-03-03 | End: 2018-03-03

## 2018-03-03 RX ADMIN — ACETAMINOPHEN 650 MG: 325 TABLET ORAL at 14:50

## 2018-03-03 NOTE — ED PROVIDER NOTES
History  Chief Complaint   Patient presents with    Wrist Pain     left wrist pain  denies injury  pt has pain and swelling  Pt 61 yo m with significant pmh htn, gerd, dm here today c/o L wrist pain x 3 days  Pt woke up with L wrist pain 3 days ago without trauma or injury  He has been taking tylenol without relief  He states that the pain is now radiating down into his hand  No prior injury  Xray shows arthritis  I advised pt to take motrin 400mg tid in addition to the tylenol  Continue to ice and f/u with pcp  Pt agreeable  History provided by:  Patient   used: No    Wrist Pain   Location:  L  Quality:  New  Severity:  Mild  Onset quality:  Sudden  Duration:  3 days  Timing:  Constant  Progression:  Unchanged  Chronicity:  New  Context:  Woke up with pain  Relieved by:  Nothing  Worsened by:  Nothing   Ineffective treatments:  Tylenol  Associated symptoms: no abdominal pain, no chest pain, no congestion, no cough, no diarrhea, no ear pain, no fatigue, no fever, no nausea, no rash, no rhinorrhea, no shortness of breath, no sore throat, no vomiting and no wheezing        Prior to Admission Medications   Prescriptions Last Dose Informant Patient Reported? Taking? amLODIPine (NORVASC) 5 mg tablet   No No   Sig: Take 1 tablet by mouth daily for 30 days   aspirin 81 mg chewable tablet   No No   Sig: Chew 1 tablet daily for 30 days   carvedilol (COREG) 3 125 mg tablet   Yes No   Sig: Take 3 125 mg by mouth 2 (two) times a day with meals  carvedilol (COREG) 3 125 mg tablet   Yes No   Sig: Take by mouth daily   dicyclomine (BENTYL) 20 mg tablet   No No   Sig: Take 1 tablet by mouth 2 (two) times a day   diphenoxylate-atropine (LOMOTIL) 2 5-0 025 mg per tablet   Yes No   Sig: Take by mouth 2 (two) times a day   fenofibrate (TRICOR) 145 mg tablet   Yes No   Sig: Take 145 mg by mouth daily     gabapentin (NEURONTIN) 600 MG tablet   Yes No   Sig: Take 600 mg by mouth daily   insulin aspart protamine-insulin aspart (NOVOLOG MIX 70/30) 100 units/mL injection   Yes No   Sig: Inject 25 Units under the skin 2 (two) times a day   lisinopril (ZESTRIL) 40 mg tablet   Yes No   Sig: Take 40 mg by mouth   lovastatin (MEVACOR) 20 mg tablet   Yes No   Sig: Take by mouth daily   omeprazole (PriLOSEC) 40 MG capsule   Yes No   Sig: Take 40 mg by mouth   ondansetron (ZOFRAN-ODT) 4 mg disintegrating tablet   No No   Sig: Take 1 tablet by mouth every 8 (eight) hours as needed for nausea or vomiting for up to 7 days      Facility-Administered Medications: None       Past Medical History:   Diagnosis Date    Diabetes mellitus ([facility] [address] )     Hypercholesteremia     Hyperlipidemia     Hypertension     Neuropathy     Obesity     PVC's (premature ventricular contractions)     sees cardiology Dr Chris camargo       Past Surgical History:   Procedure Laterality Date    ABDOMINAL SURGERY      CHOLECYSTECTOMY      OTHER SURGICAL HISTORY      "stimulator to control bowel movements"    NH ESOPHAGOGASTRODUODENOSCOPY TRANSORAL DIAGNOSTIC N/A 9/27/2016    Procedure: ESOPHAGOGASTRODUODENOSCOPY (EGD); Surgeon: Shubham Rose MD;  [facility]; Service: Gastroenterology    NH LAP,CHOLECYSTECTOMY N/A 2/29/2016    Procedure: LAPAROSCOPIC CHOLECYSTECTOMY ;  Surgeon: Anthony Caceres DO;  [facility];  Service: General    ROTATOR CUFF REPAIR Right     TOE AMPUTATION Right 10/28/2016    Procedure: 3RD TOE AMPUTATION ;  Surgeon: Serena Harrison DPM;  [facility];  Service:        Family History   Problem Relation Age of Onset    Leukemia Mother     Heart disease Father     Multiple myeloma Sister      I have reviewed and agree with the history as documented  Social History   Substance Use Topics    Smoking status: Never Smoker    Smokeless tobacco: Never Used    Alcohol use No        Review of Systems   Constitutional: Negative for chills, fatigue and fever     HENT: Negative for congestion, ear pain, rhinorrhea and sore throat  Respiratory: Negative for cough, shortness of breath and wheezing  Cardiovascular: Negative for chest pain  Gastrointestinal: Negative for abdominal pain, constipation, diarrhea, nausea and vomiting  Genitourinary: Negative for dysuria  Musculoskeletal: Negative for arthralgias, gait problem and joint swelling  Skin: Negative for rash  Neurological: Negative for weakness and light-headedness  Psychiatric/Behavioral: Negative for agitation and confusion  Physical Exam  ED Triage Vitals   Temperature Pulse Respirations Blood Pressure SpO2   03/03/18 1424 03/03/18 1424 03/03/18 1424 03/03/18 1426 03/03/18 1424   98 1 °F (36 7 °C) 92 18 (!) 188/86 98 %      Temp Source Heart Rate Source Patient Position - Orthostatic VS BP Location FiO2 (%)   03/03/18 1424 03/03/18 1424 -- 03/03/18 1426 --   Oral Monitor  Left arm       Pain Score       03/03/18 1424       8           Orthostatic Vital Signs  Vitals:    03/03/18 1424 03/03/18 1426   BP:  (!) 188/86   Pulse: 92        Physical Exam   Constitutional: He is oriented to person, place, and time  He appears well-developed and well-nourished  No distress  HENT:   Head: Atraumatic  Neck: Neck supple  Pulmonary/Chest: Effort normal  No respiratory distress  Musculoskeletal: Normal range of motion  He exhibits tenderness  L wrist ttp over the lateral aspect with radial pulse 2+  lorenzo but painful no erythema or ecchymosis, mild edema   Neurological: He is alert and oriented to person, place, and time  Skin: No rash noted  No erythema  No pallor  Psychiatric: He has a normal mood and affect  His behavior is normal    Nursing note and vitals reviewed        ED Medications  Medications   acetaminophen (TYLENOL) tablet 650 mg (not administered)       Diagnostic Studies  Results Reviewed     None                 XR wrist 3+ views LEFT    (Results Pending)              Procedures  Procedures Phone Contacts  ED Phone Contact    ED Course  ED Course                                MDM  CritCare Time    Disposition  Final diagnoses:   Wrist arthritis     Time reflects when diagnosis was documented in both MDM as applicable and the Disposition within this note     Time User Action Codes Description Comment    3/3/2018  3:31 PM Cherie Moreira Add [Q13 248] Wrist arthritis       ED Disposition     ED Disposition Condition Comment    Discharge  Padmini Morris discharge to home/self care  Condition at discharge: Stable        Follow-up Information     Follow up With Specialties Details Why [address], DO Internal Medicine In 1 week  [address] [place] [address],[address]  [place] [address]  [phone]          Patient's Medications   Discharge Prescriptions    No medications on file     No discharge procedures on file      ED Provider  Electronically Signed by           Justo Sun PA-C  03/03/18 1845

## 2018-03-03 NOTE — DISCHARGE INSTRUCTIONS
ÇáÊåÇÈ ÇáãÝÇÕá ÇáÚÙãí   ÇáÑÚÇíÉ ÇáÎÇÑÌíÉ ááãÑÖì:   ÇáÊåÇÈ ÇáãÝÇÕá ÇáÚÙãí  íÍÏË ÚäÏãÇ íÊÂßá ÇáÛÖÑæÝ ÈÈØÁ (æåæ äÓíÌ íáØÝ ÍÑßÉ TMTBEG)I ããÇ íÄÏí Åáì ÇÍÊßÇß ÇáÚÙÇã ÈÈÚÖåÇ ÇáÈÚÖ  EUTXRZL ÇáãÝÇÕá ÇáÚÙãí (OA) åæ ÍÇáÉ ãÑÖíøÉ ØæíáÉ ÇáãÏì æÊÄËÑ Ýí ÇáíÏíä æÇáÑÞÈÉ æÇáÃÌÒÇÁ ÇáÓÝáíÉ ãä ÇáÙåÑ æÝí ÇáÑßÈÊíä æÇáÃæÑÇß  íÓãì ÇáÊåÇÈ ÇáãÝÇÕá ÇáÚÙãí ÃíÖðÇ EXKBOWO Ãæ ÇáÏÇÁ ÇáãÝÕáí ÇáÊäßÓí  ÊÊÖãä RZPZOLHC æÇáÃÚÑÇÖ ÇáÔÇÆÚÉ ãÇ íáí:   · Ãáã Ýí ÇáãÝÕá íÊÝÇÞã ÚäÏ ÊÍÑíßå     · ÊíÈøÓ Ýí ÇáãÝÇÕá íÎÝø ÈÚÏ ÊÍÑíß ÇáãÝÕá     · ÊÑÇÌÚ ãÏì ÇáÍÑßÉ     · ÊÖÎã ÚÙãí ßÈíÑ Ýí ÃÕÇÈÚ ÇáíÏíä Ãæ ÇáÞÏãíä    · ÕæÊ ØÍä Ãæ ÊßÓíÑ ÚäÏ ÊÍÑíß ÇáãÝÕá  íõÑÌì ØáÈ ÇáÑÚÇíÉ ÝæÑðÇ Ýí UIOWCOP ÇáÊÇáíÉ:   · ÅÐÇ ßäÊ ÊÚÇäí ãä Ãáã ÍÇÏ    · áã íãßäß ÊÍÑíß ãÝÕáß  íõÑÌì TCHFUDP ÈãÞÏã ÇáÑÚÇíÉ ÇáÕÍíÉ áÏíß Ýí CCRVMYY ÇáÊÇáíÉ:   · ÇáÅÕÇÈÉ ÈÇáÍãì    · ÃÕÈÍ ãÝÕáß ÃÍãÑ Çááæä æåÔðÇ  · ÅÐÇ ßÇäÊ áÏíß GSELZNTVH Ãæ ãÎÇæÝ FGUZMF ÈÍÇáÊß ÇáãÑÖíøÉ Ãæ ÈÇáÑÚÇíÉ  ÚáÇÌ ÇáÊåÇÈ ÇáãÝÇÕá ÇáÚÙãí  ÞÏ íÊÖãä ÇáÚáÇÌ ÃíðÇ ããÇ íáí:  · ÇáÃÓíÊÇãíäæÝíä (Acetaminophen)  íÓÊÎÏã áÊÎÝíÝ ÇáÃáã  ÓæÝ ÊÌÏå KTULPC Ïæä ÇáÍÇÌÉ Åáì æÕÝÉ ØÈíÉ  ÇÓÊÝÓÑ Úä ßãíÉ ÇáÏæÇÁ ÇáæÇÌÈ [id number] æÚÏÏ ãÑÇÊ YUZTFZW  ÇÊÈÚ RNVIXNAGB  íãßä Ãä íÊÓÈÈ ÇáÃÓíÊÇãíäæÝíä Ýí ÊáÝ ÇáßÈÏ ÅÐÇ áã íÊã ÊäÇæáå ÈØÑíÞÉ ÕÍíÍÉ  · ÊÓÇÚÏ ÇáÃÏæíÉ ÇááÇÓÊíÑæíÏíÉ ÇáãÖÇÏÉ ááÇáÊåÇÈ (NSAID) ãËá ÅíÈæÈÑæÝíä¡ Úáì ÊÎÝíÝ ÇáÊæÑã FWIUFG æÇáÍãì  íÊæÝÑ åÐÇ ÇáÏæÇÁ ÈÃãÑ ãä ÇáØÈíÈ Ãæ Ïæäå  æíãßä Ãä ÊÊÓÈÈ ÃÏæíÉ NSAID Ýí ÍÏæË äÒíÝ ÈÇáãÚÏÉ Ãæ ãÔßáÇÊ ÈÇáßáì ÚäÏ ÈÚÖ ÇáÃÔÎÇÕ  ÅÐÇ ßäÊ QTWVUA ÃÏæíÉ áãäÚ ÊÌáØ ÇáÏã¡ ÝÇÍÑÕ ÏÇÆãðÇ Úáì ÇáÇÓÊÝÓÇÑ ãä ãÞÏã ÇáÑÚÇíÉ ÇáÕÍíÉ ÚãÇ ÅÐÇ ßÇäÊ ÇáÃÏæíÉ ÇááÇÓÊíÑæíÏíÉ ÇáãÖÇÏÉ ááÇáÊåÇÈ ÂãäÉ áß Ãã áÇ  ÇÞÑÃ ÏÇÆãðÇ ÇáãáÕÞ ÇáØÈí æÇÊÈÚ ÇáÊÚáíãÇÊ  · ßÑíã ßÇÈÓíÓíä  ÞÏ íÓÇÚÏ Úáì ÊÞáíá Ãáã [id number]  · ËãÉ ÏæÇÁ íÕÝå ÇáØÈíÈ áÊÓßíä ÇáÃáã  ÞÏ íÊã ÅÚØÇÄå áÊÞáíá ÇáÃáã ÇáÍÇÏ ÅÐÇ áã ÊÌÏ CIQMHKL GYCRORGM ÇáÃÎÑì äÝÚÇð  ÊäÇæá ÇáÏæÇÁ æÝÞðÇ ááÅÑÔÇÏÇÊ  áÇ ÊäÊÙÑ ÍÊì íÕÈÍ ÇáÃáã ÍÇÏðÇ áÊÊäÇæá ÏæÇÁß  · ÞÏ [address]  ÅÐÇ ÊÝÇÞãÊ ÇáÃÚÑÇÖ      · ÇáÚáÇÌ ÇáØÈíÚí  TWOYTTC DWIVVUY ÇáÊãÇÑíä ÇáÑíÇÖíÉ ááãÓÇÚÏÉ Úáì ÊÍÓíä ÇáÍÑßÉ æÇáÞæÉ æÊÎÝíÝ ÇáÃáã  · ÞÏ íÊØáÈ ÇáÃãÑ ÅÌÑÇÁ ÌÑÇÍÉ  ÞÏ Êßæä åäÇß ÍÇÌÉ áÅÌÑÇÁ WRPCH ÅÐÇ áã Êßä ANFJJTVH ÇáÃÎÑì ãÌÏíÉ  ÇáÊÍßã Ýí ÇáÊåÇÈ ÇáãÝÇÕá ÇáÚÙãí   · ÇÍÑÕ Úáì äÔÇØß  ÞÏ íÞáá ÇáäÔÇØ ÇáÈÏäí Åáì ÊÞáíá ÔÚæÑß ÈÇáÃáã æÊÍÓíä ÞÏÑÊß Úáì ÇáÞíÇã ÈÇáÃäÔØÉ ÇáíæãíÉ  ÊÌäÈ ÇáÃäÔØÉ ÇáÊí ÊÓÈÈ ÇáÃáã  ÇÓÊÝÓÑ ãä ãÞÏã ÇáÑÚÇíÉ ÇáÕÍíÉ ÇáÎÇÕ Èß Úä äæÚ ÇáÊãÑíä ÇáÃäÓÈ áß  · ÍÇÝÙ Úáì ÇáæÒä ÇáÕÍí áÌÓãß  íÓÇÚÏ åÐÇ Úáì ÊÞáíá ÇáÖÛØ Úáì ãÝÇÕá ÙåÑß¡ ææÑßíß¡ æÑßÈÊíß¡ æßÇÍáíß¡ æÞÏãíß  ÇÓÊÝÓÑ ãä ãÞÏã ÇáÑÚÇíÉ ÇáÕÍíÉ áÏíß Úä ÇáæÒä ÇáÐí íäÈÛí Ãä Êßæä Úáíå  æÇØáÈ ãäå Ãä íÓÇÚÏß Ýí æÖÚ ÎØÉ áÎÝÖ ÇáæÒä ÅÐÇ ßÇä æÒäß ÒÇÆÏðÇ  · ÇÓÊÎÏã UPPNFYM Ãæ EKEUM  Úáì QDVOUD ÍÓÈ EOAYWXVYW  íãßä Ãä ÊÓÇÚÏ [id number] WXNSDT Úáì ÊÞáíá ÇáÃáã æÇáÊæÑã æÊÔäÌÇÊ ÇáÚÖáÇÊ  ÇÓÊÎÏã æÓÇÏÉ ÇáÊÏÝÆÉ Úáì æÖÚ ãäÎÝÖ Ãæ ÎÐ ÍãÇãÇð ÏÇÝÆÇð  ÇÓÊÎÏã ßíÓ ËáÌ Ãæ ÖÚ ËáÌðÇ ãÌÑæÔðÇ Ýí ßíÓ ãä ÇáÈáÇÓÊíß  æÞã ÈÊÛØíÊå ÈãäÔÝÉ  · Þã ÈÊÏáíß  QBBOHLH ÇáãÍíØÉ KGSHMNB ááÊÎÝíÝ ãä ÇáÃáã UTDTCWK  · ÇÓÊÎÏÇã ÇáÚßÇÒ Ãæ ÇáÚÕÇ Ãæ ÇáãÔÇíÉ  áÊæÝíÑ ÇáÍãÇíÉ æÊÎÝíÝ ÇáÖÛØ Úáì QBXNS¡ æÑßÈÊß æ ãÝÇÕá ÇáÝÎÐ  ÞÏ íæÕÝ áß ÍÔæÇÊ ÇáÃÍÐíÉ áÊÞáíá ÇáÖÛØ Úáì ãÝÇÕáß  · ÇÑÊÏ ÃÍÐíÉ MJJMUG Ãæ ÐÇÊ ßÚæÈ ÞÕíÑÉ  ÓíÓÇÚÏ åÐÇ Úáì ÊÞáíá ÇáÃáã æÊÞáíá ÇáÖÛØ Úáì ßÇÍáß¡ æÑßÈÊß æ ãÝÇÕá ÇáÝÎÐ  ÊÇÈÚ ãÚ ãÞÏã ÇáÑÚÇíÉ ÇáÕÍíÉ áÏíß æÝÞðÇ CGZKRKBWN:  íõÝÖá ÊÏæíä AGDUTGN ÍÊì áÇ íÊã äÓíÇäåÇ ÃËäÇÁ ÒíÇÑÉ ÇáØÈíÈ  © 2017 [address] Information is for End User's use only and may not be sold, redistributed or otherwise used for commercial purposes  All illustrations and images included in CareNotes® are the copyrighted property of [employer] D A M [employer]  or Vahid Jacobo  The above information is an  only  It is not intended as medical advice for individual conditions or treatments  Talk to your doctor, nurse or pharmacist before following any medical regimen to see if it is safe and effective for you

## 2018-03-03 NOTE — ED NOTES
Patient initially declined tylenol  Changed mind  Patient given ice pack for wrist  Given PO ginger ale       Gloriann Leopard, RN  03/03/18 7312

## 2018-03-12 DIAGNOSIS — R19.7 INTERMITTENT DIARRHEA: Primary | ICD-10-CM

## 2018-03-12 RX ORDER — DIPHENOXYLATE HYDROCHLORIDE AND ATROPINE SULFATE 2.5; .025 MG/1; MG/1
1 TABLET ORAL 2 TIMES DAILY PRN
Qty: 60 TABLET | Refills: 0 | OUTPATIENT
Start: 2018-03-12 | End: 2018-03-21 | Stop reason: SDUPTHER

## 2018-03-12 RX ORDER — DIPHENOXYLATE HYDROCHLORIDE AND ATROPINE SULFATE 2.5; .025 MG/1; MG/1
TABLET ORAL 2 TIMES DAILY
Qty: 30 TABLET | Refills: 0 | OUTPATIENT
Start: 2018-03-12

## 2018-03-12 NOTE — TELEPHONE ENCOUNTER
Pt wife notified and scheduled appt for 4/2/18- states pt will not have insurance until April and is out of medication

## 2018-03-21 ENCOUNTER — OFFICE VISIT (OUTPATIENT)
Dept: INTERNAL MEDICINE CLINIC | Facility: CLINIC | Age: 58
End: 2018-03-21
Payer: COMMERCIAL

## 2018-03-21 ENCOUNTER — TRANSCRIBE ORDERS (OUTPATIENT)
Dept: ADMINISTRATIVE | Facility: HOSPITAL | Age: 58
End: 2018-03-21

## 2018-03-21 ENCOUNTER — APPOINTMENT (OUTPATIENT)
Dept: LAB | Facility: CLINIC | Age: 58
End: 2018-03-21
Payer: COMMERCIAL

## 2018-03-21 VITALS
HEIGHT: 68 IN | RESPIRATION RATE: 18 BRPM | DIASTOLIC BLOOD PRESSURE: 90 MMHG | SYSTOLIC BLOOD PRESSURE: 148 MMHG | HEART RATE: 92 BPM | TEMPERATURE: 97.5 F | BODY MASS INDEX: 36.28 KG/M2 | WEIGHT: 239.4 LBS | OXYGEN SATURATION: 96 %

## 2018-03-21 DIAGNOSIS — I10 ESSENTIAL HYPERTENSION: ICD-10-CM

## 2018-03-21 DIAGNOSIS — Z79.4 TYPE 2 DIABETES MELLITUS WITH HYPERGLYCEMIA, WITH LONG-TERM CURRENT USE OF INSULIN (HCC): Primary | ICD-10-CM

## 2018-03-21 DIAGNOSIS — Z79.4 TYPE 2 DIABETES MELLITUS WITH HYPERGLYCEMIA, WITH LONG-TERM CURRENT USE OF INSULIN (HCC): ICD-10-CM

## 2018-03-21 DIAGNOSIS — M25.532 ACUTE PAIN OF LEFT WRIST: ICD-10-CM

## 2018-03-21 DIAGNOSIS — E11.65 TYPE 2 DIABETES MELLITUS WITH HYPERGLYCEMIA, WITH LONG-TERM CURRENT USE OF INSULIN (HCC): ICD-10-CM

## 2018-03-21 DIAGNOSIS — R19.7 INTERMITTENT DIARRHEA: ICD-10-CM

## 2018-03-21 DIAGNOSIS — E11.65 TYPE 2 DIABETES MELLITUS WITH HYPERGLYCEMIA, WITH LONG-TERM CURRENT USE OF INSULIN (HCC): Primary | ICD-10-CM

## 2018-03-21 DIAGNOSIS — G56.22 ULNAR NEUROPATHY AT ELBOW OF LEFT UPPER EXTREMITY: ICD-10-CM

## 2018-03-21 LAB
ALBUMIN SERPL BCP-MCNC: 3 G/DL (ref 3.5–5)
ALP SERPL-CCNC: 135 U/L (ref 46–116)
ALT SERPL W P-5'-P-CCNC: 26 U/L (ref 12–78)
ANION GAP SERPL CALCULATED.3IONS-SCNC: 8 MMOL/L (ref 4–13)
AST SERPL W P-5'-P-CCNC: 17 U/L (ref 5–45)
BILIRUB SERPL-MCNC: 0.4 MG/DL (ref 0.2–1)
BUN SERPL-MCNC: 28 MG/DL (ref 5–25)
CALCIUM SERPL-MCNC: 8.8 MG/DL (ref 8.3–10.1)
CHLORIDE SERPL-SCNC: 102 MMOL/L (ref 100–108)
CHOLEST SERPL-MCNC: 205 MG/DL (ref 50–200)
CO2 SERPL-SCNC: 27 MMOL/L (ref 21–32)
CREAT SERPL-MCNC: 1.89 MG/DL (ref 0.6–1.3)
CREAT UR-MCNC: 103 MG/DL
GFR SERPL CREATININE-BSD FRML MDRD: 38 ML/MIN/1.73SQ M
GLUCOSE SERPL-MCNC: 242 MG/DL (ref 65–140)
HDLC SERPL-MCNC: 37 MG/DL (ref 40–60)
LDLC SERPL CALC-MCNC: 113 MG/DL (ref 0–100)
MICROALBUMIN UR-MCNC: ABNORMAL MG/L (ref 0–20)
MICROALBUMIN/CREAT 24H UR: ABNORMAL MG/G CREATININE (ref 0–30)
POTASSIUM SERPL-SCNC: 4.3 MMOL/L (ref 3.5–5.3)
PROT SERPL-MCNC: 7.1 G/DL (ref 6.4–8.2)
SL AMB POCT HEMOGLOBIN AIC: 9.5
SODIUM SERPL-SCNC: 137 MMOL/L (ref 136–145)
TRIGL SERPL-MCNC: 277 MG/DL

## 2018-03-21 PROCEDURE — 3062F POS MACROALBUMINURIA REV: CPT | Performed by: INTERNAL MEDICINE

## 2018-03-21 PROCEDURE — 80061 LIPID PANEL: CPT

## 2018-03-21 PROCEDURE — 80053 COMPREHEN METABOLIC PANEL: CPT

## 2018-03-21 PROCEDURE — 83036 HEMOGLOBIN GLYCOSYLATED A1C: CPT | Performed by: INTERNAL MEDICINE

## 2018-03-21 PROCEDURE — 36415 COLL VENOUS BLD VENIPUNCTURE: CPT

## 2018-03-21 PROCEDURE — 82570 ASSAY OF URINE CREATININE: CPT | Performed by: INTERNAL MEDICINE

## 2018-03-21 PROCEDURE — 99214 OFFICE O/P EST MOD 30 MIN: CPT | Performed by: INTERNAL MEDICINE

## 2018-03-21 PROCEDURE — 82043 UR ALBUMIN QUANTITATIVE: CPT | Performed by: INTERNAL MEDICINE

## 2018-03-21 RX ORDER — INSULIN ASPART 100 [IU]/ML
35 INJECTION, SUSPENSION SUBCUTANEOUS
Qty: 3 ML | Refills: 5 | Status: ON HOLD | OUTPATIENT
Start: 2018-03-21 | End: 2018-08-12 | Stop reason: CLARIF

## 2018-03-21 RX ORDER — CARVEDILOL 3.12 MG/1
3.12 TABLET ORAL EVERY 12 HOURS
Qty: 60 TABLET | Refills: 2 | Status: SHIPPED | OUTPATIENT
Start: 2018-03-21 | End: 2018-06-15 | Stop reason: SDUPTHER

## 2018-03-21 RX ORDER — DIPHENOXYLATE HYDROCHLORIDE AND ATROPINE SULFATE 2.5; .025 MG/1; MG/1
1 TABLET ORAL DAILY PRN
Qty: 30 TABLET | Refills: 1 | OUTPATIENT
Start: 2018-03-21 | End: 2018-03-28

## 2018-03-21 NOTE — PATIENT INSTRUCTIONS
1  Orthopedics appointment  2  Increase novolog to 70/30 to 35 units twice a day  3  Continue checking blood sugars  4  Medications refilled  5  Fasting blood work  6  GI appointment  7  Return in 4 weeks    Basic Carbohydrate Counting   AMBULATORY CARE:   Carbohydrate counting  is a way to plan your meals by counting the amount of carbohydrate in foods  Carbohydrates are the sugars, starches, and fiber found in fruit, grains, vegetables, and milk products  Carbohydrates increase your blood sugar levels  Carbohydrate counting can help you eat the right amount of carbohydrate to keep your blood sugar levels under control  What you need to know about planning meals using carbohydrate counting:  · A dietitian or healthcare provider will help you develop a healthy meal plan that works best for you  You will be taught how much carbohydrate to eat or drink for each meal and snack  Your meal plan will be based on your age, weight, usual food intake, and physical activity level  If you have diabetes, it will also include your blood sugar levels and diabetes medicine  Once you know how much carbohydrate you should eat, you can decide what type of food you want to eat  · You will need to know what foods contain carbohydrate and how much they contain  Keep track of the amount of carbohydrate in meals and snacks in order to follow your meal plan  Do not avoid carbohydrates or skip meals  Your blood sugar may fall too low if you do not eat enough carbohydrate or you skip meals  Foods that contain carbohydrate:   · Breads:  Each serving of food listed below contains about 15 g of carbohydrate   ¨ 1 slice of bread (1 ounce) or 1 flour or corn tortilla (6 inch)    ¨ ½ of a hamburger bun or ¼ of a large bagel (about 1 ounce)    ¨ 1 pancake (about 4 inches across and ¼ inch thick)    · Cereals and grains:  Serving sizes of ready-to-eat cereals vary   Look at the serving size and the total carbohydrate amount listed on the food label  Each serving of food listed below contains about 15 g of carbohydrate   ¨ ¾ cup of dry, unsweetened, ready-to-eat cereal or ¼ cup of low-fat granola     ¨ ½ cup of oatmeal or other cooked cereal     ¨ ? cup of cooked rice or pasta    · Starchy vegetables and beans:  Each serving of food listed below contains about 15 g of carbohydrate   ¨ ½ cup of corn, green peas, sweet potatoes, or mashed potatoes    ¨ ¼ of a large baked potato    ¨ ½ cup of beans, lentils, and peas (garbanzo, mars, kidney, white, split, black-eyed)    · Crackers and snacks:  Each serving of food listed below contains about 15 g of carbohydrate   ¨ 3 gareth cracker squares or 8 animal crackers     ¨ 6 saltine-type crackers    ¨ 3 cups of popcorn or ¾ ounce of pretzels, potato chips, or tortilla chips    · Fruit:  Each serving of food listed below contains about 15 g of carbohydrate   ¨ 1 small (4 ounce) piece of fresh fruit or ¾ to 1 cup of fresh fruit    ¨ ½ cup of canned or frozen fruit, packed in natural juice    ¨ ½ cup (4 ounces) of unsweetened fruit juice    ¨ 2 tablespoons of dried fruit    · Desserts or sugary foods:  Each serving of food listed below contains about 15 g of carbohydrate   ¨ 2-inch square unfrosted cake or brownie     ¨ 2 small cookies    ¨ ½ cup of ice cream, frozen yogurt, or nondairy frozen yogurt    ¨ ¼ cup of sherbet or sorbet    ¨ 1 tablespoon of regular syrup, jam, or jelly    ¨ 2 tablespoons of light syrup    · Milk and yogurt:  Foods from the milk group contain about 12 g of carbohydrate per serving  ¨ 1 cup of fat-free or low-fat milk    ¨ 1 cup of soy milk    ¨ ? cup of fat-free, yogurt sweetened with artificial sweetener    · Non-starchy vegetables:  Each serving contains about 5 g of carbohydrate   Three servings of non-starch vegetables count as 1 carbohydrate serving  ¨ ½ cup of cooked vegetables or 1 cup of raw vegetables   This includes beets, broccoli, cabbage, cauliflower, cucumber, mushrooms, tomatoes, and zucchini    ¨ ½ cup of vegetable juice  How to use carbohydrate counting to plan meals:   · Count carbohydrate amounts using serving sizes:      ¨ Pasta dinner example: You plan to have pasta, tossed salad, and an 8-ounce glass of milk  Your healthcare provider tells you that you may have 4 carbohydrate servings for dinner  One carbohydrate serving of pasta is ? cup  One cup of pasta will equal 3 carbohydrate servings  An 8-ounce glass of milk will count as 1 carbohydrate serving  These amounts of food would equal 4 carbohydrate servings  One cup of tossed salad does not count toward your carbohydrate servings  · Count carbohydrate amounts using food labels:  Find the total amount of carbohydrate in a packaged food by reading the food label  Food labels tell you the serving size of the food and the total carbohydrate amount in each serving  Find the serving size on the food label and then decide how many servings you will eat  Multiply the number of servings you plan to eat by the carbohydrate amount per serving  ¨ Granola bar snack example: Your meal plan allows you to have 2 carbohydrate servings (30 grams) of carbohydrate for a snack  You plan to eat 1 package of granola bars, which contains 2 bars  According to the food label, the serving size of food in this package is 1 bar  Each serving (1 bar) contains 25 grams of carbohydrate  The total amount of carbohydrate in this package of granola bars would be 50 g  Based on your meal plan, you should eat only 1 bar  Follow up with your healthcare provider as directed:  Write down your questions so you remember to ask them during your visits  © 2017 [address]  Information is for End User's use only and may not be sold, redistributed or otherwise used for commercial purposes   All illustrations and images included in CareNotes® are the copyrighted property of [employer] , [employer]  or [employer]  The above information is an  only  It is not intended as medical advice for individual conditions or treatments  Talk to your doctor, nurse or pharmacist before following any medical regimen to see if it is safe and effective for you  Wrist Sprain   AMBULATORY CARE:   A wrist sprain  happens when one or more ligaments in your wrist stretch or tear  Ligaments are tough tissues that connect bones and keep them in place, and support your joints  A fall onto your outstretched hand can cause a wrist sprain  An injury that causes your wrist to twist can also cause a sprain  Common symptoms include the following:   · Bruising or changes in skin color    · Pain and stiffness     · Popping sound in your wrist when you move it    · Swelling and tenderness  Seek care immediately if:   · You have severe pain or swelling  · Your injured wrist is red or has red streaks spreading from the injured area  · You have new trouble moving your hands, fingers, or wrist     · Your wrist, hand, or fingers feel cold or numb  · Your fingernails turn blue or gray  Contact your healthcare provider if:   · Your symptoms get worse  · Your sprain does not get better within 2 weeks  · You have questions or concerns about your condition or care  Treatment for a wrist sprain  depends on how severe your sprain is  You may need any of the following:  · NSAIDs , such as ibuprofen, help decrease swelling, pain, and fever  NSAIDs can cause stomach bleeding or kidney problems in certain people  If you take blood thinner medicine, always ask your healthcare provider if NSAIDs are safe for you  Always read the medicine label and follow directions  · Acetaminophen  decreases pain and fever  It is available without a doctor's order  Ask how much to take and how often to take it  Follow directions   Read the labels of all other medicines you are using to see if they also contain acetaminophen, or ask your doctor or pharmacist  Acetaminophen can cause liver damage if not taken correctly  Do not use more than 4 grams (4,000 milligrams) total of acetaminophen in one day  · A splint or cast  helps support your wrist and prevent more damage  Have your child wear his or her splint as directed  Ask for instructions on how your child should bathe while wearing a splint or cast     · Surgery  may be needed if you have a severe sprain  Arthroscopy may be done to examine the inside of your wrist joint and repair ligament damage  Arthroscopy uses a scope that is inserted through a small incision  You may need open surgery to reconnect torn ligaments to the bone  · Physical therapy  may be recommended  A physical therapist teaches you exercises to help improve movement and strength, and to decrease pain  Care for a wrist sprain:   · Rest  your wrist for at least 48 hours  Avoid activities that cause pain  · Ice  your wrist for 15 to 20 minutes every hour or as directed  Use an ice pack, or put crushed ice in a plastic bag  Cover it with a towel before you put it on your wrist  Ice helps prevent tissue damage and decreases swelling and pain  · Compress  your wrist with an elastic bandage  This will help decrease swelling, support your wrist, and help it heal  Wear your wrist wrap as directed  The elastic bandage should be snug but not tight  · Elevate  your wrist above the level of your heart as often as you can  This will help decrease swelling and pain  Prop your wrist on pillows or blankets to keep it elevated comfortably  Follow up with your healthcare provider as directed:  Write down your questions so you remember to ask them during your visits  © 2017 [address] [facility] Information is for End User's use only and may not be sold, redistributed or otherwise used for commercial purposes   All illustrations and images included in CareNotes® are the copyrighted property of A D A M , Inc  or Vahid Jacobo  The above information is an  only  It is not intended as medical advice for individual conditions or treatments  Talk to your doctor, nurse or pharmacist before following any medical regimen to see if it is safe and effective for you

## 2018-03-21 NOTE — PROGRESS NOTES
Assessment/Plan:       Diagnoses and all orders for this visit:    Type 2 diabetes mellitus with hyperglycemia, with long-term current use of insulin ([facility] [address] )  Comments:  HGA1C is 9 5% in office  Increase novolog 70/30 to 35 units BID & continue checking blood sugars  Orders:  -     POCT hemoglobin A1c  -     Comprehensive metabolic panel; Future  -     Microalbumin / creatinine urine ratio  -     Lipid Panel with Direct LDL reflex; Future  -     insulin aspart protamine-insulin aspart (NOVOLOG MIX 70/30) 100 units/mL injection; Inject 35 Units under the skin 2 (two) times a day before meals    Intermittent diarrhea  Comments:  hx of fecal incontinence for years, takes lomotil 1x per day with some relief, [place], next refill ordered to  on 4/12/18 at soonest  Orders:  -     diphenoxylate-atropine (LOMOTIL) 2 5-0 025 mg per tablet; Take 1 tablet by mouth daily as needed for diarrhea    Essential hypertension  Comments:  BP elevated, increase coreg to BID dosing and f/u BP check at next OV  Orders:  -     Comprehensive metabolic panel; Future  -     Microalbumin / creatinine urine ratio  -     Lipid Panel with Direct LDL reflex; Future  -     carvedilol (COREG) 3 125 mg tablet; Take 1 tablet (3 125 mg total) by mouth every 12 (twelve) hours    Acute pain of left wrist  Comments:  sx not improved s/p ER visit, x-rays  combination of wrist sprain and ulnar neuropathy? wife requests ortho eval which is appropriate  Orders:  -     Ambulatory referral to Orthopedic Surgery; Future    Ulnar neuropathy at elbow of left upper extremity  Comments:  off load elbow at all times, may take weeks or more to improve  D/w pt and wife  Orders:  -     Ambulatory referral to Orthopedic Surgery; Future          Subjective:      Patient ID: Padmini Morris is a 62 y o  male  HPI    Here with wife(during her appt) and requested to be seen  Not seen in >1 year in office    Hx of DM on insulin, not controlled and no BW in >1 yr also  BS in 170-180s in AM and before dinner, taking novolog 70/30 30 units BID currently  Gained 15+ lbs since last appt with me  Went to ER 1-2 wks ago for left wrist pain, no injury and had eval/x-rays which were neg and advised to f/u with PCP  Pain in wrist with ROM and tingling/pins & needles pain in lateral portion of hand/wrist with radation to 5th digit  Bowels doing okay with prn lomotil(takes 1x per day), reports he saw colo-rectal for incontinence but is due to go back  BP elevated today, reports compliance with anti-HTN meds  No other complaints  Review of Systems   Constitutional: Negative for fever  HENT: Negative for congestion  Respiratory: Negative for shortness of breath  Cardiovascular: Negative for chest pain  Gastrointestinal: Negative for abdominal pain  Genitourinary: Negative for difficulty urinating  Musculoskeletal: Negative for arthralgias  Skin: Negative for rash and wound  Neurological: Positive for numbness  Negative for dizziness  Psychiatric/Behavioral: Negative for dysphoric mood  Objective:      /90   Pulse 92   Temp 97 5 °F (36 4 °C)   Resp 18   Ht 5' 7 5" (1 715 m)   Wt 109 kg (239 lb 6 4 oz)   SpO2 96%   BMI 36 94 kg/m²          Physical Exam   Constitutional: He is oriented to person, place, and time  He appears well-developed and well-nourished  HENT:   Head: Normocephalic and atraumatic  Right Ear: External ear normal    Left Ear: External ear normal    Eyes: Conjunctivae are normal  Pupils are equal, round, and reactive to light  Cardiovascular: Normal rate, regular rhythm and normal heart sounds  No murmur heard  Pulmonary/Chest: Effort normal and breath sounds normal  He has no wheezes  He has no rales  Abdominal: Soft  Bowel sounds are normal  There is no tenderness  Musculoskeletal: He exhibits no edema          Left wrist: He exhibits decreased range of motion (2nd to lateral wrist pain and same area of tenderness)  He exhibits no effusion, no crepitus and no laceration  Reproducible tingling/symptoms in left 5th digit with palpation of ulnar nerve in cubital tunnel   Neurological: He is alert and oriented to person, place, and time  Psychiatric: He has a normal mood and affect  His behavior is normal    Vitals reviewed        Results for orders placed or performed in visit on 03/21/18   POCT hemoglobin A1c   Result Value Ref Range     HGB A1C 9 5

## 2018-03-22 ENCOUNTER — TELEPHONE (OUTPATIENT)
Dept: INTERNAL MEDICINE CLINIC | Facility: CLINIC | Age: 58
End: 2018-03-22

## 2018-03-22 DIAGNOSIS — E11.65 TYPE 2 DIABETES MELLITUS WITH HYPERGLYCEMIA, WITH LONG-TERM CURRENT USE OF INSULIN (HCC): ICD-10-CM

## 2018-03-22 DIAGNOSIS — Z79.4 TYPE 2 DIABETES MELLITUS WITH HYPERGLYCEMIA, WITH LONG-TERM CURRENT USE OF INSULIN (HCC): ICD-10-CM

## 2018-03-22 DIAGNOSIS — N18.30 CKD (CHRONIC KIDNEY DISEASE) STAGE 3, GFR 30-59 ML/MIN (HCC): ICD-10-CM

## 2018-03-22 DIAGNOSIS — R80.9 URINE TEST POSITIVE FOR MICROALBUMINURIA: ICD-10-CM

## 2018-03-22 NOTE — TELEPHONE ENCOUNTER
BW is back, Amanda Trujillo has chronic kidney disease which is same as FLORY last year but he has a lot of protein in his urine  He needs get better control of his diabetes & see  kidney doctor asap as this could be a sign his kidneys are getting worse  pls provide  nephrology phone # to pt & send msg back to me to enter ref  He needs to complete add'l urine testing prior to kidney dr vandana Das Post, cholesterol has gone up to 205    We can discuss cholesterol further at next appt    Results for orders placed or performed in visit on 03/21/18   Comprehensive metabolic panel   Result Value Ref Range    Sodium 137 136 - 145 mmol/L    Potassium 4 3 3 5 - 5 3 mmol/L    Chloride 102 100 - 108 mmol/L    CO2 27 21 - 32 mmol/L    Anion Gap 8 4 - 13 mmol/L    BUN 28 (H) 5 - 25 mg/dL    Creatinine 1 89 (H) 0 60 - 1 30 mg/dL    Glucose 242 (H) 65 - 140 mg/dL    Calcium 8 8 8 3 - 10 1 mg/dL    AST 17 5 - 45 U/L    ALT 26 12 - 78 U/L    Alkaline Phosphatase 135 (H) 46 - 116 U/L    Total Protein 7 1 6 4 - 8 2 g/dL    Albumin 3 0 (L) 3 5 - 5 0 g/dL    Total Bilirubin 0 40 0 20 - 1 00 mg/dL    eGFR 38 ml/min/1 73sq m   Lipid Panel with Direct LDL reflex   Result Value Ref Range    Cholesterol 205 (H) 50 - 200 mg/dL    Triglycerides 277 (H) <=150 mg/dL    HDL, Direct 37 (L) 40 - 60 mg/dL    LDL Calculated 113 (H) 0 - 100 mg/dL

## 2018-03-28 ENCOUNTER — OFFICE VISIT (OUTPATIENT)
Dept: NEPHROLOGY | Facility: CLINIC | Age: 58
End: 2018-03-28
Payer: COMMERCIAL

## 2018-03-28 VITALS
WEIGHT: 241.38 LBS | HEART RATE: 83 BPM | SYSTOLIC BLOOD PRESSURE: 150 MMHG | HEIGHT: 66 IN | DIASTOLIC BLOOD PRESSURE: 80 MMHG | BODY MASS INDEX: 38.79 KG/M2

## 2018-03-28 DIAGNOSIS — N18.30 CKD (CHRONIC KIDNEY DISEASE) STAGE 3, GFR 30-59 ML/MIN (HCC): ICD-10-CM

## 2018-03-28 DIAGNOSIS — R80.8 OTHER PROTEINURIA: ICD-10-CM

## 2018-03-28 DIAGNOSIS — I10 ESSENTIAL HYPERTENSION: ICD-10-CM

## 2018-03-28 DIAGNOSIS — R80.9 PROTEINURIA, UNSPECIFIED TYPE: Primary | ICD-10-CM

## 2018-03-28 DIAGNOSIS — R31.29 MICROSCOPIC HEMATURIA: ICD-10-CM

## 2018-03-28 DIAGNOSIS — E11.65 TYPE 2 DIABETES MELLITUS WITH HYPERGLYCEMIA, WITH LONG-TERM CURRENT USE OF INSULIN (HCC): Primary | ICD-10-CM

## 2018-03-28 DIAGNOSIS — Z79.4 TYPE 2 DIABETES MELLITUS WITH HYPERGLYCEMIA, WITH LONG-TERM CURRENT USE OF INSULIN (HCC): Primary | ICD-10-CM

## 2018-03-28 LAB
SL AMB  POCT GLUCOSE, UA: 100
SL AMB LEUKOCYTE ESTERASE,UA: ABNORMAL
SL AMB POCT BILIRUBIN,UA: ABNORMAL
SL AMB POCT BLOOD,UA: ABNORMAL
SL AMB POCT KETONES,UA: ABNORMAL
SL AMB POCT NITRITE,UA: ABNORMAL
SL AMB POCT PH,UA: 5
SL AMB POCT SPECIFIC GRAVITY,UA: 1.01
SL AMB POCT URINE PROTEIN: ABNORMAL
SL AMB POCT UROBILINOGEN: 0.2

## 2018-03-28 PROCEDURE — 81002 URINALYSIS NONAUTO W/O SCOPE: CPT | Performed by: INTERNAL MEDICINE

## 2018-03-28 PROCEDURE — 99244 OFF/OP CNSLTJ NEW/EST MOD 40: CPT | Performed by: INTERNAL MEDICINE

## 2018-03-28 RX ORDER — ALBUTEROL SULFATE 90 UG/1
1 AEROSOL, METERED RESPIRATORY (INHALATION) EVERY 6 HOURS PRN
COMMUNITY
Start: 2016-04-12 | End: 2018-07-29 | Stop reason: ALTCHOICE

## 2018-03-28 RX ORDER — HUMAN INSULIN 100 [USP'U]/ML
INJECTION, SUSPENSION SUBCUTANEOUS
Refills: 6 | COMMUNITY
Start: 2018-03-04 | End: 2018-07-29 | Stop reason: ALTCHOICE

## 2018-03-28 NOTE — ASSESSMENT & PLAN NOTE
-diabetes management is not at goal, hemoglobin A1c is 9 5, his diet is poor he also has poor insight into his diabetes management  -he reports no retinopathy but he does have neuropathy and vascular involvement  -I will refer him to a nutritionist for diabetes education in terms of diet  -I suspect he has underlying diabetic nephropathy

## 2018-03-28 NOTE — PROGRESS NOTES
NEPHROLOGY OUTPATIENT CONSULTATION   Manny Lewis 62 y o  male MRN: [id number]  Date: 3/28/2018  Reason for consultation:   Chief Complaint   Patient presents with    Consult       ASSESSMENT and PLAN:    Thank you for the courtesy of this consultation  I had the pleasure of seeing Tatum Cosme today in the renal clinic for the initial management of chronic kidney disease and hypertension  Type 2 diabetes mellitus with hyperglycemia (HCC)  -diabetes management is not at goal, hemoglobin A1c is 9 5, his diet is poor he also has poor insight into his diabetes management  -he reports no retinopathy but he does have neuropathy and vascular involvement  -I will refer him to a nutritionist for diabetes education in terms of diet  -I suspect he has underlying diabetic nephropathy    Essential hypertension  -blood pressure not at goal in the office  His carvedilol was increased to 3 125 mg twice a day by his primary care physician, but the patient has been not taking his medication correctly  -I have asked him to take it 3 125 mg twice a day  -low 2 g sodium diet  -diet and exercise  -he has evidence of proteinuria with underlying diabetes and may benefit from an ACE-inhibitor or an ARB  His lisinopril was discontinued last year due to concern of the rising creatinine but his creatinine has remained unchanged  -I have prescribed him a blood pressure cuff he is to monitor blood pressure twice a day  -the goal systolic blood pressure should be less than 130 mmhg  -if blood pressure not at goal I would recommend restarting an ACE-inhibitor for its anti proteinuric affects    CKD (chronic kidney disease) stage 3, GFR 30-59 ml/min  -his creatinine from last year was 1 89 and most recently it is still 1 89 which could be his baseline now  -presumed etiology is diabetic nephropathy and hypertension  -urinalysis showed glucose, blood and protein    I suspect the microscopic hematuria and proteinuria are secondary to diabetic nephropathy but I will order a abbreviated workup of serologies  -check a renal ultrasound  -avoid NSAIDs  -blood pressure control preferably with the start of an ACE-inhibitor which I anticipate a starting soon  -diabetic control    Microscopic hematuria  -will check serologies but I suspect this is underlying diabetic nephropathy  -check renal ultrasound    Other proteinuria  -check a urine protein creatinine ratio  -most likely secondary to diabetic nephropathy  -he is overweight as well  -he would likely benefit from an ACE-inhibitor or an ARB  We will check serologies Check a ratio monitor blood pressure and his blood pressure is not control start the ACE-inhibitor or ARB        PATIENT INSTRUCTIONS:    Patient Instructions   1 )  Low 2 g sodium diet    2 )  Monitor weights at home    3 )  Avoid NSAIDs    4 )  Monitor blood pressure at home, call if blood pressure greater than 150/90 persistently    5 ) Increase Coreg to 3 125 mg twice a day (once in morning and once in evening)    6 ) Nutrition for Diabetes education    7 )     7 ) Repeat urine and blood test on Saturday          HISTORY OF PRESENT ILLNESS:  Requesting Physician: DO Fredis Galvez Section is a 62 y o  male who diabetes mellitus type 2 for approximately 18 years with no reported retinopathy but does have neuropathy, as well as some vascular involvement and diabetic foot wounds, hypertension for 18 years, hyperlipidemia, obesity who presents for the initial management and evaluation of chronic kidney disease  On review of his prior blood work dating back to 2015 till his most recent    His creatinine ranged between 1 5-2 back in 2015 but appear to have improved in January of 2016 with a creatinine down to 1 1-1 2 suggesting that he may have had an episode of acute kidney injury back in December but then at the end of the month his creatinine began to increase to 1 39 as well as most through February but then the creatinine started to resolve and decreased down to 1 1-1 in March of 2016  His creatinine again lauren back in August of 2020 16-1 4 to and then reduce down to 1 1-1 2 at the end of 2016  His creatinine in 2017 February was 1 2 and then in July was 1 89  At that time his lisinopril was discontinued  His most recent blood work done on March 21st shows creatinine to be 1 89  His diabetes is not controlled and his blood pressure is also not controlled  Patient has poor insight into his medications and his diet  He reports no family history of kidney disease  He reports no NSAID use  He does not check his blood pressure at home  His son reports he eats very poorly at home  He does not diet or exercise  PAST MEDICAL HISTORY:  Past Medical History:   Diagnosis Date    Diabetes mellitus ([facility] [address] )     Hypercholesteremia     Hyperlipidemia     Hypertension     Neuropathy     Obesity     Osteomyelitis ([facility] [address] )     last assessed 11/4/16    PVC's (premature ventricular contractions)     sees cardiology Dr Karely camargo       PAST SURGICAL HISTORY:  Past Surgical History:   Procedure Laterality Date    ABDOMINAL SURGERY      CHOLECYSTECTOMY      Percutaneous    OTHER SURGICAL HISTORY      "stimulator to control bowel movements"    VT ESOPHAGOGASTRODUODENOSCOPY TRANSORAL DIAGNOSTIC N/A 9/27/2016    Procedure: ESOPHAGOGASTRODUODENOSCOPY (EGD); Surgeon: Brenda Ortiz MD;  [facility];   Service: Gastroenterology    VT LAP,CHOLECYSTECTOMY N/A 2/29/2016    Procedure: LAPAROSCOPIC CHOLECYSTECTOMY ;  Surgeon: Billie Hernandez DO;  [facility];  Service: General    ROTATOR CUFF REPAIR Right     TOE AMPUTATION Right 10/28/2016    Procedure: 3RD TOE AMPUTATION ;  Surgeon: Lina Mulligan DPM;  [facility];  Service:        ALLERGIES:  No Known Allergies    SOCIAL HISTORY:  History   Alcohol Use No     History   Drug Use No     History   Smoking Status    Never Smoker   Smokeless Tobacco    Never Used FAMILY HISTORY:  Family History   Problem Relation Age of Onset    Leukemia Mother     Liver disease Mother     Lung cancer Mother      heavy smoker - 3 ppd    Heart disease Father     Liver disease Father     Multiple myeloma Sister     Breast cancer Sister     Urolithiasis Family     Alcohol abuse Neg Hx     Depression Neg Hx     Drug abuse Neg Hx     Substance Abuse Neg Hx        MEDICATIONS:    Current Outpatient Prescriptions:     aspirin 81 mg chewable tablet, Chew 1 tablet daily for 30 days, Disp: 30 tablet, Rfl: 0    B-D INS SYRINGE 0 5CC/30GX1/2" 30G X 1/2" 0 5 ML MISC, USE 1 SYRINGE TWICE A DAY, Disp: , Rfl: 6    carvedilol (COREG) 3 125 mg tablet, Take by mouth daily, Disp: , Rfl:     carvedilol (COREG) 3 125 mg tablet, Take 1 tablet (3 125 mg total) by mouth every 12 (twelve) hours, Disp: 60 tablet, Rfl: 2    dicyclomine (BENTYL) 20 mg tablet, Take 1 tablet by mouth 2 (two) times a day, Disp: 20 tablet, Rfl: 0    gabapentin (NEURONTIN) 600 MG tablet, Take 600 mg by mouth daily, Disp: , Rfl:     insulin aspart protamine-insulin aspart (NOVOLOG MIX 70/30) 100 units/mL injection, Inject 35 Units under the skin 2 (two) times a day before meals, Disp: 3 mL, Rfl: 5    lovastatin (MEVACOR) 20 mg tablet, Take by mouth daily, Disp: , Rfl:     NOVOLIN 70/30 (70-30) 100 UNIT/ML subcutaneous injection, INJECT 30 UNITS IN THE AM AND PM OR AS DIRECTED, Disp: , Rfl: 6    albuterol (PROAIR HFA) 90 mcg/act inhaler, Inhale 1 puff every 6 (six) hours as needed, Disp: , Rfl:     amLODIPine (NORVASC) 5 mg tablet, Take 1 tablet by mouth daily for 30 days, Disp: 30 tablet, Rfl: 0    Blood Pressure Monitoring (RELION BLOOD PRESSURE CUFF) MISC, by Does not apply route 2 (two) times a day, Disp: 1 each, Rfl: 0    REVIEW OF SYSTEMS:  Review of Systems   Constitutional: Negative for activity change and fever  Respiratory: Negative for cough, chest tightness, shortness of breath and wheezing  Cardiovascular: Negative for chest pain and leg swelling  Gastrointestinal: Negative for abdominal pain, diarrhea, nausea and vomiting  Endocrine: Negative for polyuria  Genitourinary: Negative for difficulty urinating, dysuria, flank pain, frequency and urgency  Skin: Negative for rash  Neurological: Negative for dizziness, syncope, light-headedness and headaches  All the systems were reviewed and were negative except as documented on the HPI  PHYSICAL EXAM:  Current Weight: Body mass index is 38 96 kg/m²  Vitals:    03/28/18 0823   BP: 150/80   BP Location: Right arm   Patient Position: Sitting   Pulse: 83   Weight: 109 kg (241 lb 6 oz)   Height: 5' 6" (1 676 m)       Physical Exam   Constitutional: He is oriented to person, place, and time  He appears well-developed and well-nourished  No distress  HENT:   Head: Normocephalic and atraumatic  Eyes: Pupils are equal, round, and reactive to light  No scleral icterus  Neck: Normal range of motion  Neck supple  Cardiovascular: Normal rate, regular rhythm and normal heart sounds  Exam reveals no gallop and no friction rub  No murmur heard  Pulmonary/Chest: Effort normal and breath sounds normal  No respiratory distress  He has no wheezes  He has no rales  He exhibits no tenderness  Abdominal: Soft  Bowel sounds are normal  He exhibits no distension  There is no tenderness  There is no rebound  Musculoskeletal: Normal range of motion  He exhibits edema  Neurological: He is alert and oriented to person, place, and time  Skin: No rash noted  He is not diaphoretic  Psychiatric: He has a normal mood and affect  Nursing note and vitals reviewed        Laboratory results:   Results for orders placed or performed in visit on 03/21/18   Comprehensive metabolic panel   Result Value Ref Range    Sodium 137 136 - 145 mmol/L    Potassium 4 3 3 5 - 5 3 mmol/L    Chloride 102 100 - 108 mmol/L    CO2 27 21 - 32 mmol/L    Anion Gap 8 4 - 13 mmol/L    BUN 28 (H) 5 - 25 mg/dL    Creatinine 1 89 (H) 0 60 - 1 30 mg/dL    Glucose 242 (H) 65 - 140 mg/dL    Calcium 8 8 8 3 - 10 1 mg/dL    AST 17 5 - 45 U/L    ALT 26 12 - 78 U/L    Alkaline Phosphatase 135 (H) 46 - 116 U/L    Total Protein 7 1 6 4 - 8 2 g/dL    Albumin 3 0 (L) 3 5 - 5 0 g/dL    Total Bilirubin 0 40 0 20 - 1 00 mg/dL    eGFR 38 ml/min/1 73sq m   Lipid Panel with Direct LDL reflex   Result Value Ref Range    Cholesterol 205 (H) 50 - 200 mg/dL    Triglycerides 277 (H) <=150 mg/dL    HDL, Direct 37 (L) 40 - 60 mg/dL    LDL Calculated 113 (H) 0 - 100 mg/dL   '

## 2018-03-28 NOTE — PATIENT INSTRUCTIONS
1  )  Low 2 g sodium diet    2 )  Monitor weights at home    3 )  Avoid NSAIDs    4 )  Monitor blood pressure at home, call if blood pressure greater than 150/90 persistently    5 ) Increase Coreg to 3 125 mg twice a day (once in morning and once in evening)    6 ) Nutrition for Diabetes education    7 )     7 ) Repeat urine and blood test on Saturday

## 2018-03-28 NOTE — ASSESSMENT & PLAN NOTE
-check a urine protein creatinine ratio  -most likely secondary to diabetic nephropathy  -he is overweight as well  -he would likely benefit from an ACE-inhibitor or an ARB    We will check serologies Check a ratio monitor blood pressure and his blood pressure is not control start the ACE-inhibitor or ARB

## 2018-03-28 NOTE — ASSESSMENT & PLAN NOTE
-his creatinine from last year was 1 89 and most recently it is still 1 89 which could be his baseline now  -presumed etiology is diabetic nephropathy and hypertension  -urinalysis showed glucose, blood and protein    I suspect the microscopic hematuria and proteinuria are secondary to diabetic nephropathy but I will order a abbreviated workup of serologies  -check a renal ultrasound  -avoid NSAIDs  -blood pressure control preferably with the start of an ACE-inhibitor which I anticipate a starting soon  -diabetic control

## 2018-03-28 NOTE — ASSESSMENT & PLAN NOTE
-will check serologies but I suspect this is underlying diabetic nephropathy  -check renal ultrasound

## 2018-03-28 NOTE — ASSESSMENT & PLAN NOTE
-blood pressure not at goal in the office  His carvedilol was increased to 3 125 mg twice a day by his primary care physician, but the patient has been not taking his medication correctly  -I have asked him to take it 3 125 mg twice a day  -low 2 g sodium diet  -diet and exercise  -he has evidence of proteinuria with underlying diabetes and may benefit from an ACE-inhibitor or an ARB    His lisinopril was discontinued last year due to concern of the rising creatinine but his creatinine has remained unchanged  -I have prescribed him a blood pressure cuff he is to monitor blood pressure twice a day  -the goal systolic blood pressure should be less than 130 mmhg  -if blood pressure not at goal I would recommend restarting an ACE-inhibitor for its anti proteinuric affects

## 2018-04-16 ENCOUNTER — TELEPHONE (OUTPATIENT)
Dept: INTERNAL MEDICINE CLINIC | Facility: CLINIC | Age: 58
End: 2018-04-16

## 2018-04-16 ENCOUNTER — APPOINTMENT (OUTPATIENT)
Dept: RADIOLOGY | Facility: CLINIC | Age: 58
End: 2018-04-16
Payer: COMMERCIAL

## 2018-04-16 ENCOUNTER — OFFICE VISIT (OUTPATIENT)
Dept: OBGYN CLINIC | Facility: CLINIC | Age: 58
End: 2018-04-16
Payer: COMMERCIAL

## 2018-04-16 VITALS
DIASTOLIC BLOOD PRESSURE: 85 MMHG | HEART RATE: 90 BPM | SYSTOLIC BLOOD PRESSURE: 135 MMHG | BODY MASS INDEX: 38.62 KG/M2 | HEIGHT: 66 IN | WEIGHT: 240.3 LBS

## 2018-04-16 DIAGNOSIS — G56.22 ULNAR NEUROPATHY AT ELBOW OF LEFT UPPER EXTREMITY: ICD-10-CM

## 2018-04-16 DIAGNOSIS — M25.532 ACUTE PAIN OF LEFT WRIST: ICD-10-CM

## 2018-04-16 DIAGNOSIS — G56.22 ULNAR TUNNEL SYNDROME OF LEFT WRIST: Primary | ICD-10-CM

## 2018-04-16 DIAGNOSIS — M65.9 FLEXOR CARPI ULNARIS TENOSYNOVITIS: ICD-10-CM

## 2018-04-16 PROCEDURE — 73110 X-RAY EXAM OF WRIST: CPT

## 2018-04-16 PROCEDURE — 99203 OFFICE O/P NEW LOW 30 MIN: CPT | Performed by: INTERNAL MEDICINE

## 2018-04-16 NOTE — TELEPHONE ENCOUNTER
2nd msg left for pt to call back
Blood sugars between 140 and 150   Taking 35 units in am and 35 units in pm  Appt made for 04/18/18
D/w Elena Bautista MA, pt okay waiting till appt in 2 days to discuss BS readings
Left message for pt to call office
Okay    pls try again in early afternoon, I have few appts open as of now for later today    thx
rec'd msg from pt's orthopedics dr who wants to treat edward with prednisone/steroid for wrist pain    This medication may cause edward's blood sugars to go up   what are edward's blood sugars this past week? What dose of insulin is he taking? Twice a day? Can he come in to see me today to follow up?     thx
1 person assist

## 2018-04-16 NOTE — PROGRESS NOTES
Assessment/Plan:  Assessment/Plan   Diagnoses and all orders for this visit:    Ulnar tunnel syndrome of left wrist  -     Misc  Devices (WRIST BRACE) MISC; by Does not apply route continuous    Acute pain of left wrist  Comments:  sx not improved s/p ER visit, x-rays  combination of wrist sprain and ulnar neuropathy? wife requests ortho eval which is appropriate  Orders:  -     Ambulatory referral to Orthopedic Surgery  -     XR wrist 3+ vw left; Future  -     Misc  Devices (WRIST BRACE) MISC; by Does not apply route continuous    Ulnar neuropathy at elbow of left upper extremity  Comments:  off load elbow at all times, may take weeks or more to improve  D/w pt and wife  Orders:  -     Ambulatory referral to Orthopedic Surgery  -     XR wrist 3+ vw left; Future  -     Misc  Devices (WRIST BRACE) MISC; by Does not apply route continuous    Flexor carpi ulnaris tenosynovitis  -     Misc  Devices (WRIST BRACE) MISC; by Does not apply route continuous      Mr Demetrio Colvin physical examination symptom be consistent with moderate to severe ulnar-sided wrist tenosynovitis with associated ulnar tunnel syndrome  I discussed my clinical impression and management options with him  Given that he has chronic kidney disease, he is not a good candidate for NSAIDs  I placed him in a wrist brace  I also discussed wrist injection with patient  However, he is not very interested with that for now  He can benefit from p o  Steroid especially given that he is currently on insulin  However, he will need insulin sliding scale to accommodate for the potential increased serum glucose level from the steroid  I personally discussed this option with patient's PMD (Dr Sulma Sen) today as well  Patient or his physician will notify our office once his PMD started him on insulin sliding scale so that he can be started on p o  Prednisone  He will tentatively follow up for re-evaluation in 1 month          Patient was advised to call and return to the clinic sooner or go to the closest emergency room if he develops any symptom exacerbation  This document was recorded using voice recognition software and errors may be noted  Subjective:   Patient ID: Antonio Sheffield is a 62 y o  male  HPI    Mr Mariah Stein is a pleasant 51-year-old right-hand-dominant male with history of diabetes and diabetic neuropathy presents for initial evaluation of an acute onset of left wrist pain  That started about 1 month ago  He subsequently  Developed associated swelling about 2 weeks ago  His pain and swelling as per my on the ulnar aspect of his wrist   His pain is now constant and aggravated by use of his wrist   The pain is also there at night  The pain sometimes wakes him up at night  The pain radiates  For upwards into the distal forearm and into all of his fingers   The following portions of the patient's history were reviewed and updated as appropriate: allergies, current medications, past family history, past medical history, past social history, past surgical history and problem list     Review of Systems  Review of Systems   Constitutional: Negative  HENT: Negative  Eyes: Negative  Respiratory: Negative  Cardiovascular: Negative  Musculoskeletal:        As per history of present illness   Skin: Negative  Neurological:        As per history of present illness   Psychiatric/Behavioral: Negative  Objective:  Left Hand Exam     Tenderness   The patient is experiencing tenderness in the ulnar area and dorsal area  Range of Motion     Wrist   Left wrist extension: Painful  Muscle Strength   :  4/5     Other   Erythema: absent  Sensation: normal  Pulse: present    Comments:  Left flexor carpi ulnaris tendon tenderness  Extensor and flexor carpi ulnaris tendon tenderness  Extensor digiti minimi tenderness  Abductor digiti minimi tenderness  Positive ulnar tunnel Tinel sign  Pain with wrist ulnar deviation    Ulnar sided wrist pain with wrist flexion  Physical Exam  Constitutional: Oriented to person, place, and time  Well-developed and well-nourished  HENT:   Head: Normocephalic and atraumatic  Eyes: Conjunctivae are normal    Cardiovascular: Normal rate  Pulmonary/Chest: Effort normal    Neurological: Alert and oriented to person, place, and time  Skin: Skin is warm and dry  Psychiatric: Normal mood and affect  I have personally reviewed pertinent films in PACS and my interpretation is Wrist x-ray is significant for vessel calcification  Sherre Soulier

## 2018-04-23 ENCOUNTER — HOSPITAL ENCOUNTER (EMERGENCY)
Facility: HOSPITAL | Age: 58
Discharge: HOME | End: 2018-04-24
Attending: EMERGENCY MEDICINE | Admitting: EMERGENCY MEDICINE
Payer: COMMERCIAL

## 2018-04-23 VITALS
OXYGEN SATURATION: 99 % | DIASTOLIC BLOOD PRESSURE: 89 MMHG | SYSTOLIC BLOOD PRESSURE: 185 MMHG | RESPIRATION RATE: 18 BRPM | HEART RATE: 83 BPM | TEMPERATURE: 98.6 F

## 2018-04-23 DIAGNOSIS — L28.2 PRURITIC RASH: Primary | ICD-10-CM

## 2018-04-23 DIAGNOSIS — Z79.4 TYPE 2 DIABETES MELLITUS WITH DIABETIC NEUROPATHY, WITH LONG-TERM CURRENT USE OF INSULIN (HCC): Primary | ICD-10-CM

## 2018-04-23 DIAGNOSIS — E11.40 TYPE 2 DIABETES MELLITUS WITH DIABETIC NEUROPATHY, WITH LONG-TERM CURRENT USE OF INSULIN (HCC): Primary | ICD-10-CM

## 2018-04-23 DIAGNOSIS — E78.2 MIXED HYPERLIPIDEMIA: ICD-10-CM

## 2018-04-23 RX ORDER — LOVASTATIN 20 MG/1
20 TABLET ORAL
Qty: 90 TABLET | Refills: 1 | Status: ON HOLD | OUTPATIENT
Start: 2018-04-23 | End: 2018-08-12 | Stop reason: CLARIF

## 2018-04-24 LAB
ANION GAP SERPL CALCULATED.3IONS-SCNC: 9 MMOL/L (ref 4–13)
BASOPHILS # BLD AUTO: 0.02 THOUSANDS/ΜL (ref 0–0.1)
BASOPHILS NFR BLD AUTO: 0 % (ref 0–1)
BUN SERPL-MCNC: 27 MG/DL (ref 5–25)
CALCIUM SERPL-MCNC: 9 MG/DL (ref 8.3–10.1)
CHLORIDE SERPL-SCNC: 102 MMOL/L (ref 100–108)
CO2 SERPL-SCNC: 26 MMOL/L (ref 21–32)
CREAT SERPL-MCNC: 2.25 MG/DL (ref 0.6–1.3)
EOSINOPHIL # BLD AUTO: 0.18 THOUSAND/ΜL (ref 0–0.61)
EOSINOPHIL NFR BLD AUTO: 2 % (ref 0–6)
ERYTHROCYTE [DISTWIDTH] IN BLOOD BY AUTOMATED COUNT: 13.6 % (ref 11.6–15.1)
GFR SERPL CREATININE-BSD FRML MDRD: 31 ML/MIN/1.73SQ M
GLUCOSE SERPL-MCNC: 242 MG/DL (ref 65–140)
HCT VFR BLD AUTO: 40.7 % (ref 36.5–49.3)
HGB BLD-MCNC: 13.6 G/DL (ref 12–17)
LYMPHOCYTES # BLD AUTO: 2.59 THOUSANDS/ΜL (ref 0.6–4.47)
LYMPHOCYTES NFR BLD AUTO: 33 % (ref 14–44)
MCH RBC QN AUTO: 28.5 PG (ref 26.8–34.3)
MCHC RBC AUTO-ENTMCNC: 33.4 G/DL (ref 31.4–37.4)
MCV RBC AUTO: 85 FL (ref 82–98)
MONOCYTES # BLD AUTO: 0.77 THOUSAND/ΜL (ref 0.17–1.22)
MONOCYTES NFR BLD AUTO: 10 % (ref 4–12)
NEUTROPHILS # BLD AUTO: 4.21 THOUSANDS/ΜL (ref 1.85–7.62)
NEUTS SEG NFR BLD AUTO: 55 % (ref 43–75)
PLATELET # BLD AUTO: 175 THOUSANDS/UL (ref 149–390)
PMV BLD AUTO: 10.9 FL (ref 8.9–12.7)
POTASSIUM SERPL-SCNC: 4.4 MMOL/L (ref 3.5–5.3)
RBC # BLD AUTO: 4.78 MILLION/UL (ref 3.88–5.62)
SODIUM SERPL-SCNC: 137 MMOL/L (ref 136–145)
WBC # BLD AUTO: 7.77 THOUSAND/UL (ref 4.31–10.16)

## 2018-04-24 PROCEDURE — 80048 BASIC METABOLIC PNL TOTAL CA: CPT | Performed by: EMERGENCY MEDICINE

## 2018-04-24 PROCEDURE — 36415 COLL VENOUS BLD VENIPUNCTURE: CPT | Performed by: EMERGENCY MEDICINE

## 2018-04-24 PROCEDURE — 99283 EMERGENCY DEPT VISIT LOW MDM: CPT

## 2018-04-24 PROCEDURE — 85025 COMPLETE CBC W/AUTO DIFF WBC: CPT | Performed by: EMERGENCY MEDICINE

## 2018-04-24 RX ORDER — DIPHENHYDRAMINE HCL 12.5MG/5ML
50 LIQUID (ML) ORAL ONCE
Status: COMPLETED | OUTPATIENT
Start: 2018-04-24 | End: 2018-04-24

## 2018-04-24 RX ADMIN — DIPHENHYDRAMINE HYDROCHLORIDE 50 MG: 25 SOLUTION ORAL at 01:06

## 2018-04-24 NOTE — ED PROVIDER NOTES
History  Chief Complaint   Patient presents with    Rash     Pt started w/ generalized itchy rash starting yesterday  Unknown cause        History provided by:  Patient and spouse   used: No    61 y/o male presents with generalized pruritic rash beginning yesterday  No new known exposures, meds, travel, sick contacts  Hx of poorly controlled diabetes  Has diffuse macular rash with petechiae on legs  DDx includes allergy, ITP, vasculitis  Plan labs, re-eval  Oral steroids not appropriate due to elevated home glucose  Prior to Admission Medications   Prescriptions Last Dose Informant Patient Reported? Taking? B-D INS SYRINGE 0 5CC/30GX1/2" 30G X 1/2" 0 5 ML MISC  Self Yes No   Sig: USE 1 SYRINGE TWICE A DAY   Blood Pressure Monitoring (RELION BLOOD PRESSURE CUFF) MISC   No No   Sig: by Does not apply route 2 (two) times a day   Misc   Devices (WRIST BRACE) MISC   No No   Sig: by Does not apply route continuous   NOVOLIN 70/30 (70-30) 100 UNIT/ML subcutaneous injection  Self Yes No   Sig: INJECT 30 UNITS IN THE AM AND PM OR AS DIRECTED   albuterol (PROAIR HFA) 90 mcg/act inhaler  Self Yes No   Sig: Inhale 1 puff every 6 (six) hours as needed   amLODIPine (NORVASC) 5 mg tablet  Self No No   Sig: Take 1 tablet by mouth daily for 30 days   aspirin 81 mg chewable tablet  Self No No   Sig: Chew 1 tablet daily for 30 days   carvedilol (COREG) 3 125 mg tablet  Self Yes No   Sig: Take by mouth daily   carvedilol (COREG) 3 125 mg tablet  Self No No   Sig: Take 1 tablet (3 125 mg total) by mouth every 12 (twelve) hours   dicyclomine (BENTYL) 20 mg tablet  Self No No   Sig: Take 1 tablet by mouth 2 (two) times a day   gabapentin (NEURONTIN) 600 MG tablet  Self Yes No   Sig: Take 600 mg by mouth daily   insulin aspart protamine-insulin aspart (NOVOLOG MIX 70/30) 100 units/mL injection  Self No No   Sig: Inject 35 Units under the skin 2 (two) times a day before meals   lovastatin (MEVACOR) 20 mg tablet No No   Sig: Take 1 tablet (20 mg total) by mouth daily at bedtime      Facility-Administered Medications: None       Past Medical History:   Diagnosis Date    [facility] mellitus ([facility] )     Hypercholesteremia     Hyperlipidemia     Hypertension     Neuropathy     Obesity     Osteomyelitis ([facility] )     last assessed 11/4/16    PVC's (premature ventricular contractions)     sees cardiology Dr Vandana Longo       Past Surgical History:   Procedure Laterality Date    ABDOMINAL SURGERY      CHOLECYSTECTOMY      Percutaneous    OTHER SURGICAL HISTORY      "stimulator to control bowel movements"    MD ESOPHAGOGASTRODUODENOSCOPY TRANSORAL DIAGNOSTIC N/A 9/27/2016    Procedure: ESOPHAGOGASTRODUODENOSCOPY (EGD); Surgeon: Charanjit Dick MD;  [facility]: [facility]; Service: Gastroenterology    MD LAP,CHOLECYSTECTOMY N/A 2/29/2016    Procedure: LAPAROSCOPIC CHOLECYSTECTOMY ;  Surgeon: Tomas Dumont DO;  Location[facility];  Service: General    ROTATOR CUFF REPAIR Right     TOE AMPUTATION Right 10/28/2016    Procedure: 3RD TOE AMPUTATION ;  Surgeon: Suzi Traore DPM;  [facility];  Service:        Family History   Problem Relation Age of Onset    Leukemia Mother     Liver disease Mother     Lung cancer Mother      heavy smoker - 3 ppd    Heart disease Father     Liver disease Father     Multiple myeloma Sister     Breast cancer Sister     Urolithiasis Family     Alcohol abuse Neg Hx     Depression Neg Hx     Drug abuse Neg Hx     Substance Abuse Neg Hx      I have reviewed and agree with the history as documented  Social History   Substance Use Topics    Smoking status: Never Smoker    Smokeless tobacco: Never Used    Alcohol use No        Review of Systems   Constitutional: Negative for activity change, appetite change, fatigue and fever  Respiratory: Negative for chest tightness and shortness of breath  Gastrointestinal: Negative for abdominal pain, nausea and vomiting  Skin: Positive for rash  Negative for color change  Neurological: Negative for weakness and headaches  All other systems reviewed and are negative  Physical Exam  ED Triage Vitals [04/23/18 2143]   Temperature Pulse Respirations Blood Pressure SpO2   98 6 °F (37 °C) 83 18 (!) 185/89 99 %      Temp Source Heart Rate Source Patient Position - Orthostatic VS BP Location FiO2 (%)   Oral Monitor Sitting Left arm --      Pain Score       No Pain           Orthostatic Vital Signs  Vitals:    04/23/18 2143   BP: (!) 185/89   Pulse: 83   Patient Position - Orthostatic VS: Sitting       Physical Exam   Constitutional: He is oriented to person, place, and time  He appears well-developed and well-nourished  No distress  HENT:   Head: Normocephalic and atraumatic  Mouth/Throat: Oropharynx is clear and moist    Eyes: Conjunctivae are normal  Pupils are equal, round, and reactive to light  Cardiovascular: Normal rate, regular rhythm and intact distal pulses  Pulmonary/Chest: Effort normal and breath sounds normal    Abdominal: Soft  He exhibits no distension  There is no tenderness  Musculoskeletal: Normal range of motion  He exhibits no edema  Neurological: He is alert and oriented to person, place, and time  Skin: Skin is warm and dry  Diffuse macular rash with petechiae on legs  Many excoriations  Psychiatric: He has a normal mood and affect  His behavior is normal    Nursing note and vitals reviewed        ED Medications  Medications   diphenhydrAMINE (BENADRYL) oral elixir 50 mg (50 mg Oral Given 4/24/18 0106)       Diagnostic Studies  Results Reviewed     Procedure Component Value Units Date/Time    Basic metabolic panel [[id number]]  (Abnormal) Collected:  04/24/18 0112    Lab Status:  Final result Specimen:  Blood from Arm, Right Updated:  04/24/18 0129     Sodium 137 mmol/L      Potassium 4 4 mmol/L      Chloride 102 mmol/L      CO2 26 mmol/L      Anion Gap 9 mmol/L      BUN 27 (H) mg/dL Creatinine 2 25 (H) mg/dL      Glucose 242 (H) mg/dL      Calcium 9 0 mg/dL      eGFR 31 ml/min/1 73sq m     Narrative:         National Kidney Disease Education Program recommendations are as follows:  GFR calculation is accurate only with a steady state creatinine  Chronic Kidney disease less than 60 ml/min/1 73 sq  meters  Kidney failure less than 15 ml/min/1 73 sq  meters  CBC and differential [[id number]]  (Normal) Collected:  04/24/18 0112    Lab Status:  Final result Specimen:  Blood from Arm, Right Updated:  04/24/18 0118     WBC 7 77 Thousand/uL      RBC 4 78 Million/uL      Hemoglobin 13 6 g/dL      Hematocrit 40 7 %      MCV 85 fL      MCH 28 5 pg      MCHC 33 4 g/dL      RDW 13 6 %      MPV 10 9 fL      Platelets 220 Thousands/uL      Neutrophils Relative 55 %      Lymphocytes Relative 33 %      Monocytes Relative 10 %      Eosinophils Relative 2 %      Basophils Relative 0 %      Neutrophils Absolute 4 21 Thousands/µL      Lymphocytes Absolute 2 59 Thousands/µL      Monocytes Absolute 0 77 Thousand/µL      Eosinophils Absolute 0 18 Thousand/µL      Basophils Absolute 0 02 Thousands/µL                  No orders to display              Procedures  Procedures       Phone Contacts  ED Phone Contact    ED Course                               MDM  Number of Diagnoses or Management Options  Pruritic rash:   Diagnosis management comments: 63 y/o male with diffuse macular rash, pruritic as well as petechiae on legs only  Normal platelets  Elevated glucose and creatinine  Vasculitis vs allergy  Will forgo steroids to prevent worsening hyperglycemia  For now sx treatment and f/u         Amount and/or Complexity of Data Reviewed  Clinical lab tests: ordered and reviewed  Obtain history from someone other than the patient: yes    Patient Progress  Patient progress: stable    CritCare Time    Disposition  Final diagnoses:   Pruritic rash     Time reflects when diagnosis was documented in both MDM as applicable and the Disposition within this note     Time User Action Codes Description Comment    4/24/2018  1:38 AM Gustabo Vera Add [L28 2] Pruritic rash       ED Disposition     ED Disposition Condition Comment    Discharge  [address] discharge to home/self care      Condition at discharge: Good        Follow-up Information     Follow up With Specialties Details Why Contact Info Additional [address],  Internal Medicine   [address]  [address] [place] [address] Emergency Department Emergency Medicine  If symptoms worsen [address] [id number]  [phone] [facility], [address], [place], [place], [address]        Discharge Medication List as of 4/24/2018  1:39 AM      CONTINUE these medications which have NOT CHANGED    Details   albuterol (PROAIR HFA) 90 mcg/act inhaler Inhale 1 puff every 6 (six) hours as needed, Starting Tue 4/12/2016, Historical Med      amLODIPine (NORVASC) 5 mg tablet Take 1 tablet by mouth daily for 30 days, Starting Sat 10/29/2016, Until Wed 3/28/2018, Print      aspirin 81 mg chewable tablet Chew 1 tablet daily for 30 days, Starting Sat 10/29/2016, Until Wed 3/28/2018, Print      B-D INS SYRINGE 0 5CC/30GX1/2" 30G X 1/2" 0 5 ML MISC USE 1 SYRINGE TWICE A DAY, Historical Med      Blood Pressure Monitoring (RELION BLOOD PRESSURE CUFF) MISC by Does not apply route 2 (two) times a day, Starting Wed 3/28/2018, Normal      !! carvedilol (COREG) 3 125 mg tablet Take by mouth daily, Starting Tue 1/26/2016, Historical Med      !! carvedilol (COREG) 3 125 mg tablet Take 1 tablet (3 125 mg total) by mouth every 12 (twelve) hours, Starting Wed 3/21/2018, Normal      dicyclomine (BENTYL) 20 mg tablet Take 1 tablet by mouth 2 (two) times a day, Starting Sun 7/23/2017, Print      gabapentin (NEURONTIN) 600 MG tablet Take 600 mg by mouth daily, Historical Med      insulin aspart protamine-insulin aspart (NOVOLOG MIX 70/30) 100 units/mL injection Inject 35 Units under the skin 2 (two) times a day before meals, Starting Wed 3/21/2018, Normal      lovastatin (MEVACOR) 20 mg tablet Take 1 tablet (20 mg total) by mouth daily at bedtime, Starting Mon 4/23/2018, Normal      Misc  Devices (WRIST BRACE) MISC by Does not apply route continuous, Starting Tue 4/17/2018, Normal      NOVOLIN 70/30 (70-30) 100 UNIT/ML subcutaneous injection INJECT 30 UNITS IN THE AM AND PM OR AS DIRECTED, Historical Med       !! - Potential duplicate medications found  Please discuss with provider  No discharge procedures on file      ED Provider  Electronically Signed by           Misael Land MD  04/30/18 0877

## 2018-04-25 ENCOUNTER — TELEPHONE (OUTPATIENT)
Dept: INTERNAL MEDICINE CLINIC | Facility: CLINIC | Age: 58
End: 2018-04-25

## 2018-04-25 DIAGNOSIS — R19.7 INTERMITTENT DIARRHEA: Primary | ICD-10-CM

## 2018-04-25 RX ORDER — DIPHENOXYLATE HYDROCHLORIDE AND ATROPINE SULFATE 2.5; .025 MG/1; MG/1
1 TABLET ORAL DAILY PRN
Qty: 30 TABLET | Refills: 1 | OUTPATIENT
Start: 2018-04-25 | End: 2018-07-26 | Stop reason: SDUPTHER

## 2018-04-25 NOTE — TELEPHONE ENCOUNTER
Pt told me he is taking 1x per day at last OV    rx re-ordered with 1 refill but Pacheco Coni needs to go back and see colo-rectal specialist    pls call rx into pharmacy

## 2018-05-22 ENCOUNTER — TELEPHONE (OUTPATIENT)
Dept: INTERNAL MEDICINE CLINIC | Facility: CLINIC | Age: 58
End: 2018-05-22

## 2018-05-22 DIAGNOSIS — E11.65 TYPE 2 DIABETES MELLITUS WITH HYPERGLYCEMIA, WITH LONG-TERM CURRENT USE OF INSULIN (HCC): ICD-10-CM

## 2018-05-22 DIAGNOSIS — L60.8 DEFORMITY OF TOENAIL: Primary | ICD-10-CM

## 2018-05-22 DIAGNOSIS — Z79.4 TYPE 2 DIABETES MELLITUS WITH HYPERGLYCEMIA, WITH LONG-TERM CURRENT USE OF INSULIN (HCC): ICD-10-CM

## 2018-06-15 DIAGNOSIS — I10 ESSENTIAL HYPERTENSION: ICD-10-CM

## 2018-06-15 RX ORDER — CARVEDILOL 3.12 MG/1
3.12 TABLET ORAL EVERY 12 HOURS
Qty: 60 TABLET | Refills: 1 | Status: ON HOLD | OUTPATIENT
Start: 2018-06-15 | End: 2018-08-12 | Stop reason: CLARIF

## 2018-06-15 NOTE — TELEPHONE ENCOUNTER
What are his home BP readings? Is he taking his BP Medications(amlodipine/carvedilol 2x per day)?     If not checking at home, needs to come into office to have it checked & for an OV within next 2 weeks

## 2018-07-26 DIAGNOSIS — Z79.4 TYPE 2 DIABETES MELLITUS WITH DIABETIC NEUROPATHY, WITH LONG-TERM CURRENT USE OF INSULIN (HCC): Primary | ICD-10-CM

## 2018-07-26 DIAGNOSIS — E11.40 TYPE 2 DIABETES MELLITUS WITH DIABETIC NEUROPATHY, WITH LONG-TERM CURRENT USE OF INSULIN (HCC): Primary | ICD-10-CM

## 2018-07-26 DIAGNOSIS — R19.7 INTERMITTENT DIARRHEA: ICD-10-CM

## 2018-07-26 RX ORDER — DIPHENOXYLATE HYDROCHLORIDE AND ATROPINE SULFATE 2.5; .025 MG/1; MG/1
1 TABLET ORAL DAILY PRN
Qty: 30 TABLET | Refills: 0 | Status: ON HOLD | OUTPATIENT
Start: 2018-07-26 | End: 2018-08-12 | Stop reason: CLARIF

## 2018-07-26 NOTE — TELEPHONE ENCOUNTER
Re-ordered lomotil for 1 month, pls call [facility]    But he needs to call in with his home blood sugar readings & complete DM blood work or I will not be able to fill lomotil rx any longer

## 2018-07-28 ENCOUNTER — APPOINTMENT (OUTPATIENT)
Dept: LAB | Facility: CLINIC | Age: 58
End: 2018-07-28
Payer: COMMERCIAL

## 2018-07-28 DIAGNOSIS — Z79.4 TYPE 2 DIABETES MELLITUS WITH DIABETIC NEUROPATHY, WITH LONG-TERM CURRENT USE OF INSULIN (HCC): ICD-10-CM

## 2018-07-28 DIAGNOSIS — E11.40 TYPE 2 DIABETES MELLITUS WITH DIABETIC NEUROPATHY, WITH LONG-TERM CURRENT USE OF INSULIN (HCC): ICD-10-CM

## 2018-07-28 LAB
ALBUMIN SERPL BCP-MCNC: 2.9 G/DL (ref 3.5–5)
ALP SERPL-CCNC: 133 U/L (ref 46–116)
ALT SERPL W P-5'-P-CCNC: 29 U/L (ref 12–78)
ANION GAP SERPL CALCULATED.3IONS-SCNC: 6 MMOL/L (ref 4–13)
AST SERPL W P-5'-P-CCNC: 18 U/L (ref 5–45)
BILIRUB SERPL-MCNC: 0.4 MG/DL (ref 0.2–1)
BUN SERPL-MCNC: 26 MG/DL (ref 5–25)
CALCIUM SERPL-MCNC: 8.8 MG/DL (ref 8.3–10.1)
CHLORIDE SERPL-SCNC: 105 MMOL/L (ref 100–108)
CHOLEST SERPL-MCNC: 163 MG/DL (ref 50–200)
CO2 SERPL-SCNC: 26 MMOL/L (ref 21–32)
CREAT SERPL-MCNC: 2.37 MG/DL (ref 0.6–1.3)
CREAT UR-MCNC: 77.3 MG/DL
EST. AVERAGE GLUCOSE BLD GHB EST-MCNC: 169 MG/DL
GFR SERPL CREATININE-BSD FRML MDRD: 29 ML/MIN/1.73SQ M
GLUCOSE P FAST SERPL-MCNC: 223 MG/DL (ref 65–99)
HBA1C MFR BLD: 7.5 % (ref 4.2–6.3)
HDLC SERPL-MCNC: 35 MG/DL (ref 40–60)
LDLC SERPL CALC-MCNC: 80 MG/DL (ref 0–100)
MICROALBUMIN UR-MCNC: 5020 MG/L (ref 0–20)
MICROALBUMIN/CREAT 24H UR: 6494 MG/G CREATININE (ref 0–30)
POTASSIUM SERPL-SCNC: 4.5 MMOL/L (ref 3.5–5.3)
PROT SERPL-MCNC: 6.8 G/DL (ref 6.4–8.2)
SODIUM SERPL-SCNC: 137 MMOL/L (ref 136–145)
TRIGL SERPL-MCNC: 239 MG/DL

## 2018-07-28 PROCEDURE — 36415 COLL VENOUS BLD VENIPUNCTURE: CPT

## 2018-07-28 PROCEDURE — 80053 COMPREHEN METABOLIC PANEL: CPT

## 2018-07-28 PROCEDURE — 82570 ASSAY OF URINE CREATININE: CPT

## 2018-07-28 PROCEDURE — 80061 LIPID PANEL: CPT

## 2018-07-28 PROCEDURE — 3062F POS MACROALBUMINURIA REV: CPT | Performed by: INTERNAL MEDICINE

## 2018-07-28 PROCEDURE — 82043 UR ALBUMIN QUANTITATIVE: CPT

## 2018-07-28 PROCEDURE — 83036 HEMOGLOBIN GLYCOSYLATED A1C: CPT

## 2018-07-29 ENCOUNTER — APPOINTMENT (EMERGENCY)
Dept: CT IMAGING | Facility: HOSPITAL | Age: 58
DRG: 061 | End: 2018-07-29
Payer: COMMERCIAL

## 2018-07-29 ENCOUNTER — HOSPITAL ENCOUNTER (INPATIENT)
Facility: HOSPITAL | Age: 58
LOS: 4 days | DRG: 061 | End: 2018-08-02
Attending: EMERGENCY MEDICINE | Admitting: INTERNAL MEDICINE
Payer: COMMERCIAL

## 2018-07-29 ENCOUNTER — APPOINTMENT (INPATIENT)
Dept: CT IMAGING | Facility: HOSPITAL | Age: 58
DRG: 061 | End: 2018-07-29
Payer: COMMERCIAL

## 2018-07-29 ENCOUNTER — APPOINTMENT (EMERGENCY)
Dept: RADIOLOGY | Facility: HOSPITAL | Age: 58
DRG: 061 | End: 2018-07-29
Payer: COMMERCIAL

## 2018-07-29 DIAGNOSIS — I63.9 CVA (CEREBRAL VASCULAR ACCIDENT) (HCC): ICD-10-CM

## 2018-07-29 DIAGNOSIS — I63.9 STROKE (HCC): Primary | ICD-10-CM

## 2018-07-29 PROBLEM — G81.90 HEMIPLEGIA (HCC): Status: ACTIVE | Noted: 2018-07-29

## 2018-07-29 PROBLEM — R47.01 APHASIA: Status: ACTIVE | Noted: 2018-07-29

## 2018-07-29 PROBLEM — R47.1 DYSARTHRIA: Status: ACTIVE | Noted: 2018-07-29

## 2018-07-29 LAB
ABO GROUP BLD: NORMAL
ANION GAP BLD CALC-SCNC: 14 MMOL/L (ref 4–13)
ANION GAP SERPL CALCULATED.3IONS-SCNC: 7 MMOL/L (ref 4–13)
APTT PPP: 31 SECONDS (ref 24–36)
BLD GP AB SCN SERPL QL: NEGATIVE
BUN BLD-MCNC: 27 MG/DL (ref 5–25)
BUN SERPL-MCNC: 27 MG/DL (ref 5–25)
CA-I BLD-SCNC: 1.16 MMOL/L (ref 1.12–1.32)
CALCIUM SERPL-MCNC: 8.7 MG/DL (ref 8.3–10.1)
CHLORIDE BLD-SCNC: 105 MMOL/L (ref 100–108)
CHLORIDE SERPL-SCNC: 104 MMOL/L (ref 100–108)
CHOLEST SERPL-MCNC: 166 MG/DL (ref 50–200)
CO2 SERPL-SCNC: 26 MMOL/L (ref 21–32)
CREAT BLD-MCNC: 2 MG/DL (ref 0.6–1.3)
CREAT SERPL-MCNC: 2.14 MG/DL (ref 0.6–1.3)
ERYTHROCYTE [DISTWIDTH] IN BLOOD BY AUTOMATED COUNT: 13.7 % (ref 11.6–15.1)
GFR SERPL CREATININE-BSD FRML MDRD: 33 ML/MIN/1.73SQ M
GFR SERPL CREATININE-BSD FRML MDRD: 36 ML/MIN/1.73SQ M
GLUCOSE SERPL-MCNC: 138 MG/DL (ref 65–140)
GLUCOSE SERPL-MCNC: 212 MG/DL (ref 65–140)
GLUCOSE SERPL-MCNC: 213 MG/DL (ref 65–140)
GLUCOSE SERPL-MCNC: 218 MG/DL (ref 65–140)
HCT VFR BLD AUTO: 39.7 % (ref 36.5–49.3)
HCT VFR BLD CALC: 39 % (ref 36.5–49.3)
HDLC SERPL-MCNC: 33 MG/DL (ref 40–60)
HGB BLD-MCNC: 13.3 G/DL (ref 12–17)
HGB BLDA-MCNC: 13.3 G/DL (ref 12–17)
INR PPP: 0.93 (ref 0.86–1.17)
LDLC SERPL CALC-MCNC: 64 MG/DL (ref 0–100)
MCH RBC QN AUTO: 28.8 PG (ref 26.8–34.3)
MCHC RBC AUTO-ENTMCNC: 33.5 G/DL (ref 31.4–37.4)
MCV RBC AUTO: 86 FL (ref 82–98)
PCO2 BLD: 25 MMOL/L (ref 21–32)
PLATELET # BLD AUTO: 171 THOUSANDS/UL (ref 149–390)
PMV BLD AUTO: 10.9 FL (ref 8.9–12.7)
POTASSIUM BLD-SCNC: 4.4 MMOL/L (ref 3.5–5.3)
POTASSIUM SERPL-SCNC: 4.4 MMOL/L (ref 3.5–5.3)
PROTHROMBIN TIME: 12.2 SECONDS (ref 11.8–14.2)
RBC # BLD AUTO: 4.62 MILLION/UL (ref 3.88–5.62)
RH BLD: POSITIVE
SODIUM BLD-SCNC: 138 MMOL/L (ref 136–145)
SODIUM SERPL-SCNC: 137 MMOL/L (ref 136–145)
SPECIMEN EXPIRATION DATE: NORMAL
SPECIMEN SOURCE: ABNORMAL
SPECIMEN SOURCE: NORMAL
TRIGL SERPL-MCNC: 343 MG/DL
TROPONIN I BLD-MCNC: 0.02 NG/ML (ref 0–0.08)
TROPONIN I SERPL-MCNC: <0.02 NG/ML
WBC # BLD AUTO: 7.42 THOUSAND/UL (ref 4.31–10.16)

## 2018-07-29 PROCEDURE — 70498 CT ANGIOGRAPHY NECK: CPT

## 2018-07-29 PROCEDURE — 93005 ELECTROCARDIOGRAM TRACING: CPT

## 2018-07-29 PROCEDURE — 3E03317 INTRODUCTION OF OTHER THROMBOLYTIC INTO PERIPHERAL VEIN, PERCUTANEOUS APPROACH: ICD-10-PCS | Performed by: EMERGENCY MEDICINE

## 2018-07-29 PROCEDURE — 70450 CT HEAD/BRAIN W/O DYE: CPT

## 2018-07-29 PROCEDURE — 84484 ASSAY OF TROPONIN QUANT: CPT | Performed by: EMERGENCY MEDICINE

## 2018-07-29 PROCEDURE — 99291 CRITICAL CARE FIRST HOUR: CPT

## 2018-07-29 PROCEDURE — 84484 ASSAY OF TROPONIN QUANT: CPT

## 2018-07-29 PROCEDURE — 36415 COLL VENOUS BLD VENIPUNCTURE: CPT | Performed by: EMERGENCY MEDICINE

## 2018-07-29 PROCEDURE — 85730 THROMBOPLASTIN TIME PARTIAL: CPT | Performed by: EMERGENCY MEDICINE

## 2018-07-29 PROCEDURE — 85027 COMPLETE CBC AUTOMATED: CPT | Performed by: EMERGENCY MEDICINE

## 2018-07-29 PROCEDURE — 80048 BASIC METABOLIC PNL TOTAL CA: CPT | Performed by: EMERGENCY MEDICINE

## 2018-07-29 PROCEDURE — 70496 CT ANGIOGRAPHY HEAD: CPT

## 2018-07-29 PROCEDURE — 99223 1ST HOSP IP/OBS HIGH 75: CPT | Performed by: INTERNAL MEDICINE

## 2018-07-29 PROCEDURE — 99252 IP/OBS CONSLTJ NEW/EST SF 35: CPT | Performed by: PSYCHIATRY & NEUROLOGY

## 2018-07-29 PROCEDURE — 82948 REAGENT STRIP/BLOOD GLUCOSE: CPT

## 2018-07-29 PROCEDURE — 86850 RBC ANTIBODY SCREEN: CPT | Performed by: EMERGENCY MEDICINE

## 2018-07-29 PROCEDURE — 80061 LIPID PANEL: CPT | Performed by: PHYSICIAN ASSISTANT

## 2018-07-29 PROCEDURE — 86900 BLOOD TYPING SEROLOGIC ABO: CPT | Performed by: EMERGENCY MEDICINE

## 2018-07-29 PROCEDURE — 71045 X-RAY EXAM CHEST 1 VIEW: CPT

## 2018-07-29 PROCEDURE — 85610 PROTHROMBIN TIME: CPT | Performed by: EMERGENCY MEDICINE

## 2018-07-29 PROCEDURE — 80047 BASIC METABLC PNL IONIZED CA: CPT

## 2018-07-29 PROCEDURE — 86901 BLOOD TYPING SEROLOGIC RH(D): CPT | Performed by: EMERGENCY MEDICINE

## 2018-07-29 PROCEDURE — 85014 HEMATOCRIT: CPT

## 2018-07-29 RX ORDER — LABETALOL HYDROCHLORIDE 5 MG/ML
10 INJECTION, SOLUTION INTRAVENOUS EVERY 4 HOURS PRN
Status: DISCONTINUED | OUTPATIENT
Start: 2018-07-29 | End: 2018-08-02 | Stop reason: HOSPADM

## 2018-07-29 RX ORDER — ATORVASTATIN CALCIUM 40 MG/1
40 TABLET, FILM COATED ORAL EVERY EVENING
Status: DISCONTINUED | OUTPATIENT
Start: 2018-07-29 | End: 2018-08-01

## 2018-07-29 RX ORDER — ACETAMINOPHEN 650 MG/1
650 SUPPOSITORY RECTAL EVERY 4 HOURS PRN
Status: DISCONTINUED | OUTPATIENT
Start: 2018-07-29 | End: 2018-07-29

## 2018-07-29 RX ORDER — ACETAMINOPHEN 650 MG/1
650 SUPPOSITORY RECTAL EVERY 4 HOURS PRN
Status: DISCONTINUED | OUTPATIENT
Start: 2018-07-29 | End: 2018-08-02 | Stop reason: HOSPADM

## 2018-07-29 RX ORDER — FAMOTIDINE 20 MG/1
20 TABLET, FILM COATED ORAL DAILY
Status: ON HOLD | COMMUNITY
End: 2018-08-12 | Stop reason: CLARIF

## 2018-07-29 RX ADMIN — SODIUM CHLORIDE 2.5 MG/HR: 0.9 INJECTION, SOLUTION INTRAVENOUS at 14:24

## 2018-07-29 RX ADMIN — IODIXANOL 85 ML: 320 INJECTION, SOLUTION INTRAVASCULAR at 12:22

## 2018-07-29 RX ADMIN — SODIUM CHLORIDE 5 MG/HR: 0.9 INJECTION, SOLUTION INTRAVENOUS at 20:25

## 2018-07-29 RX ADMIN — ALTEPLASE 81 MG: KIT at 12:45

## 2018-07-29 NOTE — H&P
History and Physical - Critical Care   Mara Del Rosario 62 y o  male MRN: [id number]  Unit/Bed#: ED 27 Encounter: [id number]    Reason for Admission / Chief Complaint:   Acute Stroke   /    Slurred speech - Facial droop    History of Present Illness:  Mara Del Rosario is a 62 y o  male who presented to [address] [place] [address] ER via EMS with the above complaint as per family  Family reports that patient awoke approximately 0800 hours this am to his normal   He and his wife were sitting outside  Around 1055am patient and wife proceeded back into the house  Daughter reports she arrived home around the same time and noted that the patients "speech was different", they initially thought the patient was joking around like he normally does  Over the next several minutes, the noted patient with facial droop, appearing more confused with slurred speech, followed by inability to move his right side  9-1-1 called  Patient transported to ER / prehospital stroke alert  Although mainly [place] speaking he does speak [place] as well  Upon arrival in the ER patient with noted flaccidy about right side, R sided facial droop with slurred speech and tongue deviation  Non-contrast Head CT performed revealing no hemorrhage with chronic lacunar infarcts  CTA Head and Neck Severe focal stenosis distal right petrous/cavernous ICA junction  Moderate focal R supraclinoid ICA stensis  No large vessel occlusion or aneurysm identified  Mild ostial stenosis bilateral vertebral arteries  Moderate focal intracranial left vertebral stenosis  Case discussed with neurology, decision to administer TPa  TPa administered  Patient has an extensive PMH per family of HTN, DM for which patient creates his own insulin treatment regimen amongst others for which he is not always compliant in taking his medications  History obtained from chart review, the patient and family whom is at bedside      Past Medical History:  Past Medical History: Diagnosis Date    [facility] )     Hypercholesteremia     Hyperlipidemia     Hypertension     Neuropathy     Obesity     Osteomyelitis ([facility] )     last assessed 11/4/16    PVC's (premature ventricular contractions)     sees cardiology Dr Kimmie camargo       Past Surgical History:  Past Surgical History:   Procedure Laterality Date    ABDOMINAL SURGERY      CHOLECYSTECTOMY      Percutaneous    OTHER SURGICAL HISTORY      "stimulator to control bowel movements"    IL ESOPHAGOGASTRODUODENOSCOPY TRANSORAL DIAGNOSTIC N/A 9/27/2016    Procedure: ESOPHAGOGASTRODUODENOSCOPY (EGD); Surgeon: Murphy Sharma MD;  [facility];   Service: Gastroenterology    IL LAP,CHOLECYSTECTOMY N/A 2/29/2016    Procedure: LAPAROSCOPIC CHOLECYSTECTOMY ;  Surgeon: Gigi Smalls DO;  [facility];  Service: General    ROTATOR CUFF REPAIR Right     TOE AMPUTATION Right 10/28/2016    Procedure: 3RD TOE AMPUTATION ;  Surgeon: Steve Davis DPM;  [facility];  Service:        Past Family History:  Family History   Problem Relation Age of Onset    Leukemia Mother     Liver disease Mother     Lung cancer Mother         heavy smoker - 3 ppd    Heart disease Father     Liver disease Father     Multiple myeloma Sister     Breast cancer Sister     Urolithiasis Family     Alcohol abuse Neg Hx     Depression Neg Hx     Drug abuse Neg Hx     Substance Abuse Neg Hx        Social History:  History   Smoking Status    Never Smoker   Smokeless Tobacco    Never Used     History   Alcohol Use No     History   Drug Use No     Marital Status: /Civil Union  Exercise History: None    Medications:  Current Facility-Administered Medications   Medication Dose Route Frequency    niCARdipine (CARDENE) 25 mg (STANDARD CONCENTRATION) in sodium chloride 0 9% 250 mL  1-15 mg/hr Intravenous Titrated     Home medications:  Prior to Admission medications    Medication Sig Start Date End Date Taking? Authorizing Provider   carvedilol (COREG) 3 125 mg tablet Take 1 tablet (3 125 mg total) by mouth every 12 (twelve) hours 6/15/18  Yes Vernal Arm, DO   diphenoxylate-atropine (LOMOTIL) 2 5-0 025 mg per tablet Take 1 tablet by mouth daily as needed for diarrhea 7/26/18  Yes Raj Auguste, DO   gabapentin (NEURONTIN) 600 MG tablet Take 600 mg by mouth daily   Yes Historical Provider, MD   insulin aspart protamine-insulin aspart (NOVOLOG MIX 70/30) 100 units/mL injection Inject 35 Units under the skin 2 (two) times a day before meals 3/21/18  Yes Raj Auguste DO   lovastatin (MEVACOR) 20 mg tablet Take 1 tablet (20 mg total) by mouth daily at bedtime 4/23/18  Yes Raj Auguste, DO   Blood Pressure Monitoring (RELION BLOOD PRESSURE CUFF) MISC by Does not apply route 2 (two) times a day 3/28/18   Brenda Samuels MD   albuterol (PROAIR HFA) 90 mcg/act inhaler Inhale 1 puff every 6 (six) hours as needed 4/12/16 7/29/18  Historical Provider, MD   amLODIPine (NORVASC) 5 mg tablet Take 1 tablet by mouth daily for 30 days 10/29/16 7/29/18  Rosi Rogers MD   aspirin 81 mg chewable tablet Chew 1 tablet daily for 30 days 10/29/16 7/29/18  Rosi Rogers MD   B-D INS SYRINGE 0 5CC/30GX1/2" 30G X 1/2" 0 5 ML MISC USE 1 SYRINGE TWICE A DAY 1/24/18 7/29/18  Historical Provider, MD   dicyclomine (BENTYL) 20 mg tablet Take 1 tablet by mouth 2 (two) times a day 7/23/17 7/29/18  Joellen Sheehan, DO   Misc  Devices (WRIST BRACE) MISC by Does not apply route continuous 4/17/18 7/29/18  Serge Vigil MD   NOVOLIN 70/30 (70-30) 100 UNIT/ML subcutaneous injection INJECT 30 UNITS IN THE AM AND PM OR AS DIRECTED 3/4/18 7/29/18  Historical Provider, MD     Allergies:  No Known Allergies    ROS:   Review of Systems   Constitutional: Positive for activity change  Negative for appetite change, chills, diaphoresis, fatigue and fever  HENT: Positive for drooling   Negative for congestion, nosebleeds, sinus pain, sinus pressure, sneezing, sore throat and voice change  Right sided facial droop     Eyes: Negative for pain, discharge, redness and itching  Respiratory: Negative for chest tightness and shortness of breath  Cardiovascular: Negative for chest pain, palpitations and leg swelling  Gastrointestinal: Negative for abdominal pain and nausea  Endocrine: Negative  Genitourinary: Negative for dysuria and urgency  Musculoskeletal: Positive for gait problem  Negative for arthralgias, back pain, joint swelling, myalgias, neck pain and neck stiffness  Hemiplegia R side   Skin: Negative  Allergic/Immunologic: Negative  Neurological: Positive for facial asymmetry, speech difficulty and weakness  Negative for dizziness, tremors, seizures, syncope, light-headedness, numbness and headaches  Hematological: Negative  Psychiatric/Behavioral: Negative  Vitals:  Vitals:    18 1445 18 1500 18 1515 18 1524   BP: 165/79 (!) 189/80 (!) 185/92    BP Location:       Pulse: 80 83 82    Resp: (!) 25 20 (!) 24    Temp:    97 9 °F (36 6 °C)   TempSrc:    Oral   SpO2: 97%  98%    Weight:       Height:         Temperature:   Temp (24hrs), Av 4 °F (36 9 °C), Min:98 4 °F (36 9 °C), Max:98 4 °F (36 9 °C)    Current Temperature: 98 4 °F (36 9 °C)    Weights:   IBW: -88 kg  Body mass index is 40 12 kg/m²  Hemodynamic Monitoring:  N/A     Non-Invasive/Invasive Ventilation Settings:  Respiratory    Lab Data (Last 4 hours)    None         O2/Vent Data (Last 4 hours)    None              No results found for: PHART, WEF4MDX, PO2ART, YCF2AXS, U7MJMLOM, BEART, SOURCE  SpO2: SpO2: 98 %     Physical Exam:  Physical Exam     General: 63 y/o [place] male on ER bed in room 27 with noted R sided facial droop, in good spirits with family at bedside  HEENT: normocephalic, atraumatic   Pupils equal at 4mm however EOM noted disconjugate gaze of right eye, sclera anicteric, conjunctiva pink, unable to protrude tongue, slurred speech with moist mucous membranes  Neck: supple, trachea midline, no appreciated bruits  Heart: RRR without murmur, rub or gallop  Lungs: clear and equal all fields bilaterally, no wheezing, rales, or rhonchi  Abd: obese, soft, non-tender, no guarding, rebound, or peritoneal signs  : no chávez  Skin: warm and dry  Neuro: GCS 15, currently joking with us however noted Right sided facial droop with slurred speech / dysarthria  Noted essentially faccid about R side, although slight R hand grasp appreciated  Labs:    Results from last 7 days  Lab Units 07/29/18  1219 07/29/18  1215   WBC Thousand/uL  --  7 42   HEMOGLOBIN g/dL  --  13 3   I STAT HEMOGLOBIN g/dl 13 3  --    HEMATOCRIT %  --  39 7   PLATELETS Thousands/uL  --  171       Results from last 7 days  Lab Units 07/29/18  1219 07/29/18  1215 07/28/18  0712   SODIUM mmol/L  --  137 137   POTASSIUM mmol/L  --  4 4 4 5   CHLORIDE mmol/L  --  104 105   CO2 mmol/L  --  26 26   BUN mg/dL  --  27* 26*   CREATININE mg/dL  --  2 14* 2 37*   CALCIUM mg/dL  --  8 7 8 8   ANION GAP mmol/L  --  7 6   ALBUMIN g/dL  --   --  2 9*   BILIRUBIN TOTAL mg/dL  --   --  0 40   ALK PHOS U/L  --   --  133*   ALT U/L  --   --  29   AST U/L  --   --  18   GLUCOSE RANDOM mg/dL  --  212*  --    GLUCOSE, ISTAT mg/dl 213*  --   --             Results from last 7 days  Lab Units 07/29/18  1215   INR  0 93   PTT seconds 31       Results from last 7 days  Lab Units 07/29/18  1216   TROPONIN I ng/mL <0 02         ABG:No results found for: PHART, TRZ6CXF, PO2ART, KTV4VIL, X1HHLFXD, BEART, SOURCE  VBG:      Imaging: CT Head, CTA Head and Neck obtained - Results reviewed I have personally reviewed pertinent reports  and I have personally reviewed pertinent films in PACS  EKG: Telemetry as well as 12 lead EKG reviewed without acute events  NSR rate 80-90s without ectopy  This was personally reviewed by myself     Micro:  Lab Results   Component Value Date    BLOODCX No Growth After 5 Days  10/24/2016    BLOODCX No Growth After 5 Days  10/24/2016    URINECX No Growth <1000 cfu/mL 10/24/2016       ______________________________________________________________________    Assessment:     1  Acute CVA s/p TPA  2  Right sided hemiplegia  3  Asphasia  4  DM2 with hyperglycemia  5  CKD Stage III previous Cr 1 8 - March 2018  6  Hyperlipidemia      Plan:     Neuro: Acute CVA s/p TPA - place on stroke pathway, continue to closely monitor mental status and obtain STAT head CT with any acute change  Obtain MRI Brain as well as order repeat Head CT 24h post TPa administration  Begin ASA 24h post TPa admin as well as Statin; however at this time requires formal speech and swallow evaluation  Will defer Carotid duplex to neuro in the setting of CTA Head and Neck performed  Neurology following in consult  CV: continue telemetry monitoring, HD stable at this time, maintain goal systolic BP <627QJ/KH with goal 160-180mm/hg  Initiate and titrate cardene gtt to maintain systolic BP goal   Obtain Lipid panel  Obtain ECHO  Pulm: continue SpO2 monitoring, maintain >92%    GI: NPO at this time, as patient does have speech difficulty post event, will obtain formal speech / swallow evaluation prior to providing oral intak    : strict I/O, U/O measures, monitor renal function    F/E/N: replete lytes prn    ID: no suspected infectious etiology, monitor temps    Heme: hold on chemical DVT PPx as patient administered TPa, apply bilateral SCDs  Follow up Hgb/Plt after 24h as avoid needlesticks    Endo: hyperglycemic on admission, check HgA1C as suspect patient poorly controlled  Could perform accu-cheks via samples obtained from IV site; however no figure sticks    Msk/Skin: reposition and turn q2h, monitor for breakdown    Disposition: admit to ICU on the [facility] service of Dr Amisha Rdz, continue to closely monitor   Neurology following in consult    Counseling / Coordination of Care  Total time spent today 55 minutes  Greater than 50% of total time was spent with the patient and / or family counseling and / or coordination of care  A description of the counseling / coordination of care: review of EMR, discussion with ER attending as well as family and consulted service  Development of care and treatment plan     ______________________________________________________________________    VTE Pharmacologic Prophylaxis: Pharmacologic VTE Prophylaxis contraindicated due to administration of TPa  VTE Mechanical Prophylaxis: sequential compression device    Invasive lines and devices: Invasive Devices     Peripheral Intravenous Line            Peripheral IV 07/29/18 Left Arm less than 1 day    Peripheral IV 07/29/18 Left Forearm less than 1 day                Code Status: Level 1 - Full Code  POA:    POLST:      Given critical illness, patient length of stay will require greater than two midnights  Portions of the record may have been created with voice recognition software  Occasional wrong word or "sound a like" substitutions may have occurred due to the inherent limitations of voice recognition software  Read the chart carefully and recognize, using context, where substitutions have occurred        Joy Gaviria PA-C

## 2018-07-29 NOTE — ED PROVIDER NOTES
History  Chief Complaint   Patient presents with    CVA/TIA-like Symptoms     at [phone] pt started with right sided weakness and right sided facial droop  sudden onset  pt was fine at 10 am and then reported to be dizzy around 1055  pt then had progression of symptoms per family       History provided by:  Patient   used: No    CVA/TIA-like Symptoms   Presenting symptoms: weakness    Presenting symptoms: no headaches    Associated symptoms: no chest pain, no dizziness, no fever, no nausea, no neck pain and no vomiting      Patient is a 66-year-old male presenting to emergency department after family noticed weakness and slurring of speech  Last seen normal at 10:30 a m  Jaylen Murry Patient wants to CT scan straight for stroke alert  On arrival patient had right-sided weakness, upper lower extremities, right-sided facial droop, slurring of speech, tongue deviates to the right  Patient able to follow commands  Mainly speaks Chinese but able to converse in English somewhat  No history of stroke  Takes aspirin  No blood thinners  No trauma  No recent surgeries  No recent strokes  No history of easy bleeding or bruising  No history of GI bleeds  MDM stroke alert called    Patient at baseline has kidney dysfunction, discussed with Radiology and agree did CT a at this time would be helpful even with the kidney dysfunction  Had a long discussion with family regarding giving tPA  They understand the risk of 5-6% of conversion to bleeding but this and rate is given the better  After long discussion with wife and kids, agreement to give tPA  Neurology in agreement  Prior to Admission Medications   Prescriptions Last Dose Informant Patient Reported? Taking?    Blood Pressure Monitoring (RELION BLOOD PRESSURE CUFF) MISC  Child No No   Sig: by Does not apply route 2 (two) times a day   carvedilol (COREG) 3 125 mg tablet 7/28/2018 at Unknown time Child No Yes   Sig: Take 1 tablet (3 125 mg total) by mouth every 12 (twelve) hours   diphenoxylate-atropine (LOMOTIL) 2 5-0 025 mg per tablet 7/28/2018 at Unknown time Child No Yes   Sig: Take 1 tablet by mouth daily as needed for diarrhea   famotidine (PEPCID) 20 mg tablet   Yes Yes   Sig: Take 20 mg by mouth daily   gabapentin (NEURONTIN) 600 MG tablet 7/28/2018 at Unknown time Child Yes Yes   Sig: Take 600 mg by mouth daily   insulin aspart protamine-insulin aspart (NOVOLOG MIX 70/30) 100 units/mL injection 7/28/2018 at Unknown time Child No Yes   Sig: Inject 35 Units under the skin 2 (two) times a day before meals   lovastatin (MEVACOR) 20 mg tablet 7/28/2018 at Unknown time Child No Yes   Sig: Take 1 tablet (20 mg total) by mouth daily at bedtime      Facility-Administered Medications: None       Past Medical History:   Diagnosis Date    Diabetes mellitus ([facility] [address] )     Hypercholesteremia     Hyperlipidemia     Hypertension     Neuropathy     Obesity     Osteomyelitis ([facility] )     last assessed 11/4/16    PVC's (premature ventricular contractions)     sees cardiology Dr Víctor camargo       Past Surgical History:   Procedure Laterality Date    ABDOMINAL SURGERY      CHOLECYSTECTOMY      Percutaneous    OTHER SURGICAL HISTORY      "stimulator to control bowel movements"    AL ESOPHAGOGASTRODUODENOSCOPY TRANSORAL DIAGNOSTIC N/A 9/27/2016    Procedure: ESOPHAGOGASTRODUODENOSCOPY (EGD); Surgeon: Sarah Smith MD;  [facility];   Service: Gastroenterology    AL LAP,CHOLECYSTECTOMY N/A 2/29/2016    Procedure: LAPAROSCOPIC CHOLECYSTECTOMY ;  Surgeon: Manuela Jacobo DO;  [facility];  Service: General    ROTATOR CUFF REPAIR Right     TOE AMPUTATION Right 10/28/2016    Procedure: 3RD TOE AMPUTATION ;  Surgeon: Juan Izquierdo DPM;  [facility];  Service:        Family History   Problem Relation Age of Onset    Leukemia Mother     Liver disease Mother     Lung cancer Mother         heavy smoker - 3 ppd    Heart disease Father  Liver disease Father     Multiple myeloma Sister     Breast cancer Sister     Urolithiasis Family     Alcohol abuse Neg Hx     Depression Neg Hx     Drug abuse Neg Hx     Substance Abuse Neg Hx      I have reviewed and agree with the history as documented  Social History   Substance Use Topics    Smoking status: Never Smoker    Smokeless tobacco: Never Used    Alcohol use No        Review of Systems   Constitutional: Negative for chills, diaphoresis and fever  Respiratory: Negative for cough, shortness of breath, wheezing and stridor  Cardiovascular: Negative for chest pain, palpitations and leg swelling  Gastrointestinal: Negative for abdominal pain, blood in stool, diarrhea, nausea and vomiting  Genitourinary: Negative for dysuria, frequency and urgency  Musculoskeletal: Negative for neck pain and neck stiffness  Skin: Negative for pallor and rash  Neurological: Positive for speech difficulty and weakness  Negative for dizziness, syncope, light-headedness and headaches  All other systems reviewed and are negative  Physical Exam  Physical Exam   Constitutional: He is oriented to person, place, and time  He appears well-developed and well-nourished  HENT:   Head: Normocephalic and atraumatic  Eyes: EOM are normal  Pupils are equal, round, and reactive to light  Neck: Neck supple  Cardiovascular: Normal rate and regular rhythm  Pulmonary/Chest: Effort normal and breath sounds normal  No respiratory distress  Abdominal: Soft  He exhibits no distension  There is no tenderness  Neurological: He is alert and oriented to person, place, and time  Right-sided facial droop, slurring speech, tongue deviated to the right, right upper lower extremity weakness   Skin: Skin is warm  Capillary refill takes less than 2 seconds  No rash noted  He is not diaphoretic  No erythema  No pallor         Vital Signs  ED Triage Vitals   Temperature Pulse Respirations Blood Pressure SpO2 07/29/18 1233 07/29/18 1200 07/29/18 1200 07/29/18 1200 07/29/18 1200   98 4 °F (36 9 °C) 83 18 (!) 173/84 97 %      Temp Source Heart Rate Source Patient Position - Orthostatic VS BP Location FiO2 (%)   07/29/18 1233 07/29/18 1200 07/29/18 1200 07/29/18 1200 --   Oral Monitor Lying Left arm       Pain Score       07/29/18 1241       No Pain           Vitals:    07/30/18 1403 07/30/18 1600 07/30/18 1933 07/30/18 2315   BP: (!) 171/89 152/100 164/77 (!) 150/107   Pulse: (!) 121 84 78 77   Patient Position - Orthostatic VS:   Lying Lying       Visual Acuity  Visual Acuity      Most Recent Value   L Pupil Size (mm)  4   R Pupil Size (mm)  3   L Pupil Shape  Round   R Pupil Shape  Round          ED Medications  Medications   atorvastatin (LIPITOR) tablet 40 mg (40 mg Oral Given 7/30/18 1758)   labetalol (NORMODYNE) injection 10 mg (not administered)   acetaminophen (TYLENOL) rectal suppository 650 mg (not administered)   insulin lispro (HumaLOG) 100 units/mL subcutaneous injection 2-12 Units (2 Units Subcutaneous Given 7/30/18 1623)   insulin lispro (HumaLOG) 100 units/mL subcutaneous injection 1-6 Units (1 Units Subcutaneous Given 7/30/18 2317)   carvedilol (COREG) tablet 3 125 mg (3 125 mg Oral Given 7/30/18 2317)   famotidine (PEPCID) tablet 20 mg (not administered)   aspirin chewable tablet 81 mg (not administered)   iodixanol (VISIPAQUE) 320 MG/ML injection 85 mL (85 mL Intravenous Given 7/29/18 1222)   alteplase (ACTIVASE) bolus 9 mg (9 mg Intravenous Given 7/29/18 1245)   alteplase (ACTIVASE) infusion 81 mg (81 mg Intravenous Given 7/29/18 1245)   multi-electrolyte (ISOLYTE-S PH 7 4) bolus 500 mL (0 mL Intravenous Stopped 7/30/18 0148)   multi-electrolyte (ISOLYTE-S PH 7 4) bolus 500 mL (0 mL Intravenous Stopped 7/30/18 0230)       Diagnostic Studies  Results Reviewed     Procedure Component Value Units Date/Time    POCT troponin [[id number]] Collected:  07/29/18 1217    Lab Status:  Final result Updated: 07/29/18 1253     POC Troponin I 0 02 ng/ml      Specimen Type VENOUS    Narrative:         [employer] i-Stat handheld analyzer 99% cutoff is > 0 08ng/mL in network Emergency Departments    o cTnI 99% cutoff is useful only when applied to patients in the clinical setting of myocardial ischemia  o cTnI 99% cutoff should be interpreted in the context of clinical history, ECG findings and possibly cardiac imaging to establish correct diagnosis  o cTnI 99% cutoff may be suggestive but clearly not indicative of a coronary event without the clinical setting of myocardial ischemia  POCT Chem 8+ [[id number]]  (Abnormal) Collected:  07/29/18 1219    Lab Status:  Final result Updated:  07/29/18 1252     SODIUM, I-STAT 138 mmol/l      Potassium, i-STAT 4 4 mmol/L      Chloride, istat 105 mmol/L      CO2, i-STAT 25 mmol/L      Anion Gap, Istat 14 (H) mmol/L      Calcium, Ionized i-STAT 1 16 mmol/L      BUN, I-STAT 27 (H) mg/dl      Creatinine, i-STAT 2 0 (H) mg/dl      eGFR 36 ml/min/1 73sq m      Glucose, i-STAT 213 (H) mg/dl      Hct, i-STAT 39 %      Hgb, i-STAT 13 3 g/dl      Specimen Type VENOUS    Basic metabolic panel [[id number]]  (Abnormal) Collected:  07/29/18 1215    Lab Status:  Final result Specimen:  Blood Updated:  07/29/18 1243     Sodium 137 mmol/L      Potassium 4 4 mmol/L      Chloride 104 mmol/L      CO2 26 mmol/L      Anion Gap 7 mmol/L      BUN 27 (H) mg/dL      Creatinine 2 14 (H) mg/dL      Glucose 212 (H) mg/dL      Calcium 8 7 mg/dL      eGFR 33 ml/min/1 73sq m     Narrative:         National Kidney Disease Education Program recommendations are as follows:  GFR calculation is accurate only with a steady state creatinine  Chronic Kidney disease less than 60 ml/min/1 73 sq  meters  Kidney failure less than 15 ml/min/1 73 sq  meters      Troponin I [[id number]]  (Normal) Collected:  07/29/18 1216    Lab Status:  Final result Specimen:  Blood Updated:  07/29/18 1241     Troponin I <0 02 ng/mL     APTT [[id number]] (Normal) Collected:  07/29/18 1215    Lab Status:  Final result Specimen:  Blood Updated:  07/29/18 1232     PTT 31 seconds     Protime-INR [[id number]]  (Normal) Collected:  07/29/18 1215    Lab Status:  Final result Specimen:  Blood Updated:  07/29/18 1232     Protime 12 2 seconds      INR 0 93    CBC [[id number]]  (Normal) Collected:  07/29/18 1215    Lab Status:  Final result Specimen:  Blood Updated:  07/29/18 1217     WBC 7 42 Thousand/uL      RBC 4 62 Million/uL      Hemoglobin 13 3 g/dL      Hematocrit 39 7 %      MCV 86 fL      MCH 28 8 pg      MCHC 33 5 g/dL      RDW 13 7 %      Platelets 315 Thousands/uL      MPV 10 9 fL     Fingerstick Glucose (POCT) [[id number]]  (Abnormal) Collected:  07/29/18 1208    Lab Status:  Final result Updated:  07/29/18 1210     POC Glucose 218 (H) mg/dl                  CT head wo contrast   Final Result by Rae Ortiz MD (07/30 1146)      Evolving ischemic change in the left basal ganglia  Workstation performed: [id number]         CT head wo contrast   Final Result by Ezra Hilliard MD (07/29 1520)      No acute intracranial abnormality  Workstation performed: [id number]         XR stroke alert portable chest   Final Result by Shalini Mendez MD (07/29 1331)      No acute cardiopulmonary disease  Workstation performed: [id number]         CT stroke alert brain   Final Result by Igor Cui DO (07/29 1256)      No acute intracranial abnormality  Microangiopathic changes  Chronic lacunar infarcts are noted  No hemorrhage  Findings were directly discussed with MEG OSMAN on 7/29/2018 12:13 PM       Workstation performed: [id number]         CTA stroke alert (head/neck)   Final Result by Igor Cui DO (07/29 1241)      Severe focal stenosis distal right petrous/cavernous ICA junction  Moderate focal right supraclinoid ICA stenosis  No large vessel occlusion  No intracranial aneurysm identified  Mild ostial stenosis bilateral vertebral arteries  Moderate focal intracranial left vertebral stenosis  Mild left atherosclerotic calcification right carotid bifurcation  I personally discussed this study with MEG OSMAN on 7/29/2018 at 12:35 PM                            Workstation performed: [id number]         MRI Inpatient Order    (Results Pending)              Procedures  Procedures       Phone Contacts  ED Phone Contact    ED Course  ED Course as of Jul 31 0128   Sun Jul 29, 2018   1340 ECG shows rate of 79, sinus, normal axis, normal QRS, no significant ST or T-wave changes, independently interpreted by me                                MDM  CritCare Time    60 minutes of critical care time spent excluding procedures      Disposition  Final diagnoses:   Stroke [facility])     Time reflects when diagnosis was documented in both MDM as applicable and the Disposition within this note     Time User Action Codes Description Comment    7/29/2018 12:06 PM Meg Osman Add [I63 9] Stroke ([address] )     7/29/2018  2:10 PM Zelalem Guillen Add [I63 9] CVA (cerebral vascular accident) [facility])       ED Disposition     ED Disposition Condition Comment    Admit  Case was discussed with Dr Monisha Dao and the patient's admission status was agreed to be Admission Status: inpatient status to the service of Dr Monisha Dao   Follow-up Information     Follow up With Specialties Details Why [address] Neurology [facility] Neurology Follow up The office will contact you to schedule an appt  If you do not hear from them within a week after your discharge, please contact them to schedule   [address] [facility]  [phone]          Current Discharge Medication List      CONTINUE these medications which have NOT CHANGED    Details   carvedilol (COREG) 3 125 mg tablet Take 1 tablet (3 125 mg total) by mouth every 12 (twelve) hours  Qty: 60 tablet, Refills: 1 Comments: *NOTE DOSE INCREASE*  Associated Diagnoses: Essential hypertension      diphenoxylate-atropine (LOMOTIL) 2 5-0 025 mg per tablet Take 1 tablet by mouth daily as needed for diarrhea  Qty: 30 tablet, Refills: 0    Associated Diagnoses: Intermittent diarrhea      famotidine (PEPCID) 20 mg tablet Take 20 mg by mouth daily      gabapentin (NEURONTIN) 600 MG tablet Take 600 mg by mouth daily      insulin aspart protamine-insulin aspart (NOVOLOG MIX 70/30) 100 units/mL injection Inject 35 Units under the skin 2 (two) times a day before meals  Qty: 3 mL, Refills: 5    Associated Diagnoses: Type 2 diabetes mellitus with hyperglycemia, with long-term current use of insulin ([facility])      lovastatin (MEVACOR) 20 mg tablet Take 1 tablet (20 mg total) by mouth daily at bedtime  Qty: 90 tablet, Refills: 1    Associated Diagnoses: Type 2 diabetes mellitus with diabetic neuropathy, with long-term current use of insulin ([address] ); Mixed hyperlipidemia      Blood Pressure Monitoring (RELION BLOOD PRESSURE CUFF) MISC by Does not apply route 2 (two) times a day  Qty: 1 each, Refills: 0    Associated Diagnoses: CKD (chronic kidney disease) stage 3, GFR 30-59 ml/min           No discharge procedures on file      ED Provider  Electronically Signed by           Velia Centeno MD  07/31/18 6934

## 2018-07-29 NOTE — ED NOTES
Co-signed alteplase with Ren Vaughn   Medication confirmed with Caroline CHARLES  Nursing supervisor        Fritz Sow RN  07/29/18 8711

## 2018-07-29 NOTE — CONSULTS
Neurology Consultation    Assessment/Plan:  63 yo male presenting with acute right sided paresis, slurred speech, mild confusion and aphasia, left ptosis- presentation concerning for acute brain stem infarct  NIHSS approx 14 per ED initial exam s/p IVtPA on my exam NIHSS 8 with significant improvement of right sided paresis  - S/P IVtPA  - CTA H/N with no acute large vessel occlusion  - Stroke care pathway  - MRI brain no contrast  - Stroke risk factor modification including better DM control  - ECHO, lipid panel, HgbA1C  - At least ASA 81 to be started 24 hours s/p IVtPA as long as no bleed noted and statin  - Permissive HTN < 180/105  Patient had one episode of increased confusion with repeat CT head not acute; a few minutes afteward on my exam back to bL  History concerning for possible mild dementia vs pseudodementia from increased stressors at home and certainly either can worsen mentation currently with acute stroke  Discussed with family    - Routine EEG  - If he continues to have periods of confusion followed by unresponsiveness/ increased lethargy- recommend cEEG/ AED    Spoke with patient, son, wife, family at length  Total critical care time spent coordinating care/counseling at least 60 minutes  HPI  Marisabel Gould is a 62 y o  hx DM poorly controlled per son seen in consultation for acute onset right sided weakness garbled speech  Patient with no prior history similar per family, no known history stroke or seizure or TIA  Per son patient with poorly controlled diabetes and sedentary life style  He has no known history of heart disease, heart attack, irregular heart rhythms, not a smoker and no significant alcohol use per son      Family does tell me that for the last year he has had periods of mild confusion at home, correlating with increased stress in his life with a break with some of his family  He however did all his ADLs and worked driving 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 with no difficulties  Stroke alert called: 1157 AM Pre-hospital, neurology called back 1157 AM and then ER physician called me back upon patient's arrival- patient's initial NIHSS per ER physician's exam 14 for dense right sided paresis, slurred speech, mild aphasia, neglect and VF cut  Last known normal: 200 AM per wife- he was eating and talking normally and sudden onset of above symptoms  CT/CTA H/N not acute  IVtPA yes  No IR given no large vessel infarct    Physical Exam    General: AO x person place time- knows month but not year and situation- to some degree  NAD   HEENT: NCAT, PERRL, No septal deviation  CVS: RRR  Respiratory: Symmetric chest rise  No respiratory distress noted  No wheezing rales or rhonchi  Extremities: No edema noted  MSK: Normal ROM  NIHSS done by me: s/p IVtPA s/p 1 hour  1a  Level of Consciousness: 0 = Alert   1b  LOC Questions: 0 = Answers both correctly   1c  LOC Commands: 0 = Obeys both correctly   2  Best Gaze: 1 = Partial Gaze Palsy   3  Visual: 1= partial hemianopsia   4  Facial Palsy: 1=Minor paralysis (flattened nasolabial fold, asymmetric on smiling)   5a  Motor Right Arm: 1=Drift, limb holds 90 (or 45) degrees but drifts down before full 10 seconds: does not hit bed   5b  Motor Left Arm: 0=No drift, limb holds 90 (or 45) degrees for full 10 seconds   6a  Motor Right Le=Drift, limb holds 90 (or 45) degrees but drifts down before full 10 seconds: does not hit bed   6b  Motor Left Le=No drift, limb holds 90 (or 45) degrees for full 10 seconds   7  Limb Ataxia:  1=Present in one limb   8  Sensory: 0=Normal; no sensory loss   9  Best Language:  0=No aphasia, normal   10  Dysarthria: 1=Mild to moderate, patient slurs at least some words and at worst, can be understood with some difficulty   11  Extinction and Inattention (formerly Neglect):  1=Visual, tactile, auditory, spatial or personal inattention or extinction to bilateral simultaneous stimulation in one of the sensory modalities   Total Score: 8         Neurologic exam:  Mental Status: Alert and oriented to person, place, time- month but not year and somewhat situation  No aphasia noted on my exam s/p IV tPA,  And mild dysarthria noted and mild errors with repetition noted  Can name  Comprehension intact  Cranial Nerves:PERRL  EOM intact other than left gaze preference, crosses midline to the right but not completely and neglect noted  Left ptosis; Sensation intact V1-3 b/l  Right facial droop ntoed  Hearing intact to conversation  No tongue deviation noted (s/p IV tPA) SCM and Trapezius strength bilaterally intact  Motor Exam: RUE 4/5 and RLE 4/5 s/p IV tPA ( Per ED pre-tPA 3/3-/5 UE and 1-2/5 RLE)  R pronator drift noted     Sensory Exam: Intact sensation to PP all ext s/p IV tPA  Coordination: R mild dysmetria noted with FNF testing      Imaging Studies: I have personally reviewed pertinent films in PACS    CT head, CTA H/N not acute and repeat CT head stat as patient with acute few minutes of increased confusion in ICU not acute

## 2018-07-30 ENCOUNTER — APPOINTMENT (INPATIENT)
Dept: NON INVASIVE DIAGNOSTICS | Facility: HOSPITAL | Age: 58
DRG: 061 | End: 2018-07-30
Payer: COMMERCIAL

## 2018-07-30 ENCOUNTER — APPOINTMENT (INPATIENT)
Dept: CT IMAGING | Facility: HOSPITAL | Age: 58
DRG: 061 | End: 2018-07-30
Payer: COMMERCIAL

## 2018-07-30 PROBLEM — L60.8 DEFORMITY OF TOENAIL: Status: RESOLVED | Noted: 2018-05-22 | Resolved: 2018-07-30

## 2018-07-30 PROBLEM — R47.01 APHASIA: Status: RESOLVED | Noted: 2018-07-29 | Resolved: 2018-07-30

## 2018-07-30 PROBLEM — R19.7 INTERMITTENT DIARRHEA: Status: RESOLVED | Noted: 2018-03-12 | Resolved: 2018-07-30

## 2018-07-30 PROBLEM — M25.532 ACUTE PAIN OF LEFT WRIST: Status: RESOLVED | Noted: 2018-04-16 | Resolved: 2018-07-30

## 2018-07-30 LAB
ANION GAP SERPL CALCULATED.3IONS-SCNC: 4 MMOL/L (ref 4–13)
ATRIAL RATE: 79 BPM
BASOPHILS # BLD AUTO: 0.02 THOUSANDS/ΜL (ref 0–0.1)
BASOPHILS NFR BLD AUTO: 0 % (ref 0–1)
BUN SERPL-MCNC: 23 MG/DL (ref 5–25)
CALCIUM SERPL-MCNC: 9.2 MG/DL (ref 8.3–10.1)
CHLORIDE SERPL-SCNC: 105 MMOL/L (ref 100–108)
CO2 SERPL-SCNC: 30 MMOL/L (ref 21–32)
CREAT SERPL-MCNC: 2.26 MG/DL (ref 0.6–1.3)
EOSINOPHIL # BLD AUTO: 0.14 THOUSAND/ΜL (ref 0–0.61)
EOSINOPHIL NFR BLD AUTO: 2 % (ref 0–6)
ERYTHROCYTE [DISTWIDTH] IN BLOOD BY AUTOMATED COUNT: 13.9 % (ref 11.6–15.1)
GFR SERPL CREATININE-BSD FRML MDRD: 31 ML/MIN/1.73SQ M
GLUCOSE SERPL-MCNC: 167 MG/DL (ref 65–140)
GLUCOSE SERPL-MCNC: 170 MG/DL (ref 65–140)
GLUCOSE SERPL-MCNC: 174 MG/DL (ref 65–140)
GLUCOSE SERPL-MCNC: 188 MG/DL (ref 65–140)
HCT VFR BLD AUTO: 41.6 % (ref 36.5–49.3)
HGB BLD-MCNC: 13.8 G/DL (ref 12–17)
LYMPHOCYTES # BLD AUTO: 1.85 THOUSANDS/ΜL (ref 0.6–4.47)
LYMPHOCYTES NFR BLD AUTO: 24 % (ref 14–44)
MCH RBC QN AUTO: 28.6 PG (ref 26.8–34.3)
MCHC RBC AUTO-ENTMCNC: 33.2 G/DL (ref 31.4–37.4)
MCV RBC AUTO: 86 FL (ref 82–98)
MONOCYTES # BLD AUTO: 0.56 THOUSAND/ΜL (ref 0.17–1.22)
MONOCYTES NFR BLD AUTO: 7 % (ref 4–12)
NEUTROPHILS # BLD AUTO: 5.07 THOUSANDS/ΜL (ref 1.85–7.62)
NEUTS SEG NFR BLD AUTO: 66 % (ref 43–75)
P AXIS: 59 DEGREES
PLATELET # BLD AUTO: 190 THOUSANDS/UL (ref 149–390)
PMV BLD AUTO: 10.4 FL (ref 8.9–12.7)
POTASSIUM SERPL-SCNC: 4.5 MMOL/L (ref 3.5–5.3)
PR INTERVAL: 152 MS
QRS AXIS: -7 DEGREES
QRSD INTERVAL: 94 MS
QT INTERVAL: 388 MS
QTC INTERVAL: 444 MS
RBC # BLD AUTO: 4.82 MILLION/UL (ref 3.88–5.62)
SODIUM SERPL-SCNC: 139 MMOL/L (ref 136–145)
T WAVE AXIS: 30 DEGREES
VENTRICULAR RATE: 79 BPM
WBC # BLD AUTO: 7.64 THOUSAND/UL (ref 4.31–10.16)

## 2018-07-30 PROCEDURE — 85025 COMPLETE CBC W/AUTO DIFF WBC: CPT | Performed by: NURSE PRACTITIONER

## 2018-07-30 PROCEDURE — 97116 GAIT TRAINING THERAPY: CPT

## 2018-07-30 PROCEDURE — 92610 EVALUATE SWALLOWING FUNCTION: CPT

## 2018-07-30 PROCEDURE — 82948 REAGENT STRIP/BLOOD GLUCOSE: CPT

## 2018-07-30 PROCEDURE — G8978 MOBILITY CURRENT STATUS: HCPCS

## 2018-07-30 PROCEDURE — 93010 ELECTROCARDIOGRAM REPORT: CPT | Performed by: INTERNAL MEDICINE

## 2018-07-30 PROCEDURE — 70450 CT HEAD/BRAIN W/O DYE: CPT

## 2018-07-30 PROCEDURE — 99233 SBSQ HOSP IP/OBS HIGH 50: CPT | Performed by: PHYSICIAN ASSISTANT

## 2018-07-30 PROCEDURE — 99233 SBSQ HOSP IP/OBS HIGH 50: CPT | Performed by: INTERNAL MEDICINE

## 2018-07-30 PROCEDURE — 93306 TTE W/DOPPLER COMPLETE: CPT | Performed by: INTERNAL MEDICINE

## 2018-07-30 PROCEDURE — 93306 TTE W/DOPPLER COMPLETE: CPT

## 2018-07-30 PROCEDURE — 80048 BASIC METABOLIC PNL TOTAL CA: CPT | Performed by: NURSE PRACTITIONER

## 2018-07-30 PROCEDURE — 99357 PR PROLONGED SERV,INPATIENT,EA ADD 30 MIN: CPT | Performed by: PSYCHIATRY & NEUROLOGY

## 2018-07-30 PROCEDURE — 97163 PT EVAL HIGH COMPLEX 45 MIN: CPT

## 2018-07-30 PROCEDURE — G8979 MOBILITY GOAL STATUS: HCPCS

## 2018-07-30 RX ORDER — FAMOTIDINE 20 MG/1
20 TABLET, FILM COATED ORAL DAILY
Status: DISCONTINUED | OUTPATIENT
Start: 2018-07-31 | End: 2018-08-02 | Stop reason: HOSPADM

## 2018-07-30 RX ORDER — CARVEDILOL 3.12 MG/1
3.12 TABLET ORAL EVERY 12 HOURS
Status: DISCONTINUED | OUTPATIENT
Start: 2018-07-30 | End: 2018-07-31

## 2018-07-30 RX ORDER — SODIUM CHLORIDE, SODIUM GLUCONATE, SODIUM ACETATE, POTASSIUM CHLORIDE, MAGNESIUM CHLORIDE, SODIUM PHOSPHATE, DIBASIC, AND POTASSIUM PHOSPHATE .53; .5; .37; .037; .03; .012; .00082 G/100ML; G/100ML; G/100ML; G/100ML; G/100ML; G/100ML; G/100ML
500 INJECTION, SOLUTION INTRAVENOUS ONCE
Status: COMPLETED | OUTPATIENT
Start: 2018-07-30 | End: 2018-07-30

## 2018-07-30 RX ORDER — ASPIRIN 81 MG/1
81 TABLET, CHEWABLE ORAL DAILY
Status: DISCONTINUED | OUTPATIENT
Start: 2018-07-31 | End: 2018-08-01

## 2018-07-30 RX ADMIN — INSULIN LISPRO 1 UNITS: 100 INJECTION, SOLUTION INTRAVENOUS; SUBCUTANEOUS at 23:17

## 2018-07-30 RX ADMIN — INSULIN LISPRO 2 UNITS: 100 INJECTION, SOLUTION INTRAVENOUS; SUBCUTANEOUS at 16:23

## 2018-07-30 RX ADMIN — ATORVASTATIN CALCIUM 40 MG: 40 TABLET, FILM COATED ORAL at 17:58

## 2018-07-30 RX ADMIN — CARVEDILOL 3.12 MG: 3.12 TABLET, FILM COATED ORAL at 14:03

## 2018-07-30 RX ADMIN — SODIUM CHLORIDE, SODIUM GLUCONATE, SODIUM ACETATE, POTASSIUM CHLORIDE, MAGNESIUM CHLORIDE, SODIUM PHOSPHATE, DIBASIC, AND POTASSIUM PHOSPHATE 500 ML: .53; .5; .37; .037; .03; .012; .00082 INJECTION, SOLUTION INTRAVENOUS at 01:30

## 2018-07-30 RX ADMIN — CARVEDILOL 3.12 MG: 3.12 TABLET, FILM COATED ORAL at 23:17

## 2018-07-30 RX ADMIN — SODIUM CHLORIDE, SODIUM GLUCONATE, SODIUM ACETATE, POTASSIUM CHLORIDE, MAGNESIUM CHLORIDE, SODIUM PHOSPHATE, DIBASIC, AND POTASSIUM PHOSPHATE 500 ML: .53; .5; .37; .037; .03; .012; .00082 INJECTION, SOLUTION INTRAVENOUS at 00:48

## 2018-07-30 NOTE — PROGRESS NOTES
Spoke to MRI regarding pt transfer to [place] due to spinal stimulator  Juhi Hand and team from MRI at [facility] would prefer to have staff come to our campus to have this done  MRI is requesting the family bring in the controller for the stimulator  However, the family has lost it  Spoke with the representative from the company, [employer]thasarah[employer] Ya ([phone]) who is on vacation and will not be back until next week  She said there might be a Neurology office in [place] that has a universal remote and she would be will to walk one of the doctors through the process over the phone if they are willing  Family has ordered a new remote and would prefer py to have MRI done

## 2018-07-30 NOTE — PLAN OF CARE
Problem: PHYSICAL THERAPY ADULT  Goal: Performs mobility at highest level of function for planned discharge setting  See evaluation for individualized goals  Treatment/Interventions: Functional transfer training, LE strengthening/ROM, Therapeutic exercise, Endurance training, Cognitive reorientation, Patient/family training, Equipment eval/education, Bed mobility, Gait training (PT to see when stair training is appropriate)          See flowsheet documentation for full assessment, interventions and recommendations  Outcome: Progressing  Prognosis: Fair  Problem List: Decreased strength, Decreased endurance, Impaired balance, Decreased mobility, Decreased coordination, Decreased cognition, Decreased safety awareness, Obesity  Assessment: Therapist introduced roller walker use w/ ambulation to address mobility and physical impairments noted during evaluation  Pt was noted to have improvement in mobility status w/ use of walker w/ decreased level of assist needed to maintain safety and increased ambulation distance  Pt continues to require mod assist and verbal cues w/ all phases of mobility and is at risk for falling  continued inpatient PT tx is indicated to reduce fall risk factors  Recommendation: Post acute IP rehab, OT consult          See flowsheet documentation for full assessment

## 2018-07-30 NOTE — PROGRESS NOTES
Progress Note - ICU Transfer to Step down/med  surg  - Priscilla Potts 62 y o  male MRN: [id number]    Unit/Bed#:  Encounter: [id number]    Code Status: Level 1 - Full Code  POA:    POLST:       Reason for ICU adm:  CVA status post tPA    Active problems:   Patient Active Problem List   Diagnosis    Type 2 diabetes mellitus with hyperglycemia ([facility] )    Hyperlipidemia    Diabetic foot ulcer ([facility]    Essential hypertension    Spinal cord stimulator status    Osteomyelitis of ankle or foot, right, acute (HCC)    Type 2 diabetes mellitus with diabetic neuropathy, with long-term current use of insulin ([facility] )    Diabetic nephropathy associated with type 2 diabetes mellitus ([facility] )    CKD (chronic kidney disease) stage 3, GFR 30-59 ml/min    Urine test positive for microalbuminuria    Other proteinuria    Microscopic hematuria    Ulnar neuropathy at elbow of left upper extremity    CVA (cerebral vascular accident) ([facility]    Hemiplegia ([facility] )    Dysarthria       Consultants:  Neurology    History of Present Illness/Summary of clinical course:  20-year-old male with past medical history of hypertension, dm 2, CKD stage 3, hyperlipidemia, noncompliance who presented on 07/29 with aphasia/right-sided weakness/left-sided facial droop  NIH scale was 18  Stroke alert was called  CT of the head was negative  CTA revealed no large vessel occlusion  Right ICA stenosis, and left vertebral moderate stenosis  TPA was administered on 07/29/2018 at 12:45 p m  Reggie Norman Patient was hypertensive in the 170s to 180s in which a Cardene infusion was started  3 hours post tPA patient developed some increasing confusion and repeat CT of the head was done with no acute change  Aphasia improved along with right-sided weakness  Repeat CT of the head 24 hours post revealed evolving left basal ganglia infarct  Patient remains with waxing/waning encephalopathy/impulsiveness/mixed aphasia  Neurology evaluated and plan for EEG    MRI on hold secondary to spinal stimulator and awaiting clearance for MRI compatibility if cleared patient will need to be transferred to [facility] for MRI  Recent or scheduled procedures:   7/29 CT of the head no acute intracranial abnormality chronic lacunar infarcts  7/29 CTA of the head and neck severe focal stenosis of the distal right cavernous ICA junction, moderate focal right supraclinoid ICA stenosis, no large vessel occlusion, mild ostial stenosis of the bilateral vertebral arteries, moderate focal intracranial left vertebral stenosis, mild left atherosclerotic calcification of the right carotid bifurcation  7/29 CT of the head-no acute intracranial abnormality  7/30 CT of the head without contrast-involving ischemic change in the left basal ganglia    Outstanding/pending diagnostics:   MRI pending spinal stimulator is MRI compatible  EEG       Mobilization Plan:  Out of bed, PT eval pending    Nutrition Plan:  Diabetic    Discharge Plan:    Patient should be ready for discharge to pending PT evaluation likely rehab after neurology clearance   Initial PT/OT/ST Recommendations:  Pending   Initial /Plan:  Following     Specific Diagnosis Plan:    See progress note for full plan of care    Portions of the record may have been created with voice recognition software  Occasional wrong word or "sound a like" substitutions may have occurred due to the inherent limitations of voice recognition software  Read the chart carefully and recognize, using context, where substitutions have occurred      BRITTANY Maier

## 2018-07-30 NOTE — PROGRESS NOTES
Pt's wife attempted to assist pt to bathroom without nursing assistance or walker  Pt and family educated on the importance of having a staff member there during ambulation given his post cva status and shakiness on his feet

## 2018-07-30 NOTE — PHYSICAL THERAPY NOTE
PHYSICAL THERAPY TREATMENT NOTE    Patient Name: Mara Del Rosario  OQGRB'H Date: 7/30/2018 07/30/18 1544   Pain Assessment   Pain Assessment No/denies pain   Restrictions/Precautions   Other Precautions Impulsive;Cognitive; Chair Alarm; Bed Alarm;Telemetry; Fall Risk   General   Chart Reviewed Yes   Family/Caregiver Present Yes   Cognition   Overall Cognitive Status Impaired   Arousal/Participation Cooperative   Attention Attends with cues to redirect   Orientation Level Oriented to person;Oriented to place; Disoriented to time;Disoriented to situation; Other (Comment)  (pt was identified w/ full name and birth date)   Following Commands Follows one step commands with increased time or repetition   Subjective   Subjective pt agreed to participate in PT intervention  Transfers   Sit to Stand 3  Moderate assistance   Additional items Assist x 1; Increased time required; Impulsive;Verbal cues  (for hand placement, LE positioning)   Stand to Sit 3  Moderate assistance   Additional items Assist x 1; Impulsive;Verbal cues  (for body positioning, hand placement, controlled descent)   Ambulation/Elevation   Gait pattern Decreased R stance; Ataxia; Foward flexed   Gait Assistance 3  Moderate assist   Additional items Assist x 1;Verbal cues; Tactile cues  (for device placement, posture, obstacles)   Assistive Device Rolling walker   Distance 10, 15 feet w/ seated rest break x 3 minutes  (additional not possible due to fatigue)   Stair Management Assistance Not tested   Balance   Static Sitting Fair   Dynamic Sitting Poor   Static Standing Poor +   Dynamic Standing Poor   Ambulatory Poor  (w/ roller walker)   Activity Tolerance   Activity Tolerance Patient limited by fatigue   Nurse Made [facility]ware [facility]   Assessment   Prognosis Fair   Problem List Decreased strength;Decreased endurance; Impaired balance;Decreased mobility; Decreased coordination;Decreased cognition;Decreased safety awareness; Obesity   Assessment Therapist introduced roller walker use w/ ambulation to address mobility and physical impairments noted during evaluation  Pt was noted to have improvement in mobility status w/ use of walker w/ decreased level of assist needed to maintain safety and increased ambulation distance  Pt continues to require mod assist and verbal cues w/ all phases of mobility and is at risk for falling  continued inpatient PT tx is indicated to reduce fall risk factors  Goals   Patient Goals go back to work   STG Expiration Date 08/09/18   Short Term Goal #1 pt will: Increase R LE strength 1/2 grade to facilitate independent mobility, Perform all bed mobility tasks w/ supervision to decrease fall risk factors, Perform all transfers w/ supervision to improve independence, Ambulate 150 ft  with least restrictive assistive device w/ minx1 w/o LOB to facilitate return to all daily functions including work as , Increase all balance 1 grade to decrease risk for falls, Complete exercise program independently to improve overall activity tolerance, Tolerate 3 hr OOB to faciliate upright tolerance and Improve Barthel Index score to 60 or greater to facilitate independence  PT to see when stair training is appropriate  Treatment Day 1   Plan   Treatment/Interventions Functional transfer training;LE strengthening/ROM; Therapeutic exercise; Endurance training;Cognitive reorientation;Patient/family training;Equipment eval/education; Bed mobility;Gait training  (PT to see when stair training is appropriate )   Progress Progressing toward goals   PT Frequency Other (Comment)  (3 to 5x/week and 1x on weekend as able)   Recommendation   Recommendation Post acute IP rehab;OT consult   Equipment Recommended Other (Comment)  (roller walker)     Skilled inpatient PT recommended while in hospital to progress pt toward treatment goals      Oseas Joseph, PT

## 2018-07-30 NOTE — CASE MANAGEMENT
Initial Clinical Review    Thank you,  [place] Christina  [facility] Utilization Review Department  Phone: [phone]; Fax [phone]  ATTENTION: The Network Utilization Review Department is now centralized for our 9 Facilities  Make a note that we have a new phone and fax numbers for our Department  Please call with any questions or concerns to [phone] and carefully follow the prompts so that you are directed to the right person  All voicemails are confidential  Fax any determinations, approvals, denials, and requests for initial or continue stay review clinical to [phone]  Due to HIGH CALL volume, it would be easier if you could please send faxed requests to expedite your requests and in part, help us provide discharge notifications faster  Admission: Date/Time/Statement: 7/29/18 @ 1305     Orders Placed This Encounter   Procedures    Inpatient Admission (expected length of stay for this patient is greater than two midnights)     Standing Status:   Standing     Number of Occurrences:   1     Order Specific Question:   Admitting Physician     Answer:   Lamin Reyes     Order Specific Question:   Level of Care     Answer:   Critical Care [15]     Order Specific Question:   Estimated length of stay     Answer:   More than 2 Midnights     Order Specific Question:   Certification     Answer:   I certify that inpatient services are medically necessary for this patient for a duration of greater than two midnights  See H&P and MD Progress Notes for additional information about the patient's course of treatment         ED: Date/Time/Mode of Arrival:   ED Arrival Information     Expected Arrival Acuity Means of Arrival Escorted By Service Admission Type    - 7/29/2018 12:04 Immediate Ambulance Self Critical Care/ICU Emergency    Arrival Complaint    -          Chief Complaint:   Chief Complaint   Patient presents with    CVA/TIA-like Symptoms     at [phone] pt started with right sided weakness and right sided facial droop  sudden onset  pt was fine at 10 am and then reported to be dizzy around 1055  pt then had progression of symptoms per family       History of Illness:  62 y o  male who presented to [address] [place] [address] ER via EMS with the above complaint as per family  Family reports that patient awoke approximately 0800 hours this am to his normal   He and his wife were sitting outside  Around 1055 am patient and wife proceeded back into the house  Daughter reports she arrived home around the same time and noted that the patients "speech was different", they initially thought the patient was joking around like he normally does  Over the next several minutes, the noted patient with facial droop, appearing more confused with slurred speech, followed by inability to move his right side  9-1-1 called      Patient transported to ER / prehospital stroke alert  Although mainly [place] speaking he does speak [place] as well  Upon arrival in the ER patient with noted flaccidy about right side, R sided facial droop with slurred speech and tongue deviation  Non-contrast Head CT performed revealing no hemorrhage with chronic lacunar infarcts  CTA Head and Neck Severe focal stenosis distal right petrous/cavernous ICA junction  Moderate focal R supraclinoid ICA stensis  No large vessel occlusion or aneurysm identified  Mild ostial stenosis bilateral vertebral arteries  Moderate focal intracranial left vertebral stenosis        Case discussed with neurology, decision to administer TPa  TPa administered        ED Vital Signs:   ED Triage Vitals   Temperature Pulse Respirations Blood Pressure SpO2   07/29/18 1233 07/29/18 1200 07/29/18 1200 07/29/18 1200 07/29/18 1200   98 4 °F (36 9 °C) 83 18 (!) 173/84 97 %      Temp Source Heart Rate Source Patient Position - Orthostatic VS BP Location FiO2 (%)   07/29/18 1233 07/29/18 1200 07/29/18 1200 07/29/18 1200 --   Oral Monitor Lying Left arm Pain Score       07/29/18 1241       No Pain        Wt Readings from Last 1 Encounters:   07/29/18 112 kg (247 lb 12 8 oz)       Vital Signs (abnormal): R 23, /80    Abnormal Labs/Diagnostic Test Results:   Sodium 136 - 145 mmol/L 137    Potassium 3 5 - 5 3 mmol/L 4 4    Chloride 100 - 108 mmol/L 104    CO2 21 - 32 mmol/L 26    Anion Gap 4 - 13 mmol/L 7    BUN 5 - 25 mg/dL 27     Creatinine 0 60 - 1 30 mg/dL 2 14     Glucose 65 - 140 mg/dL 212       Imaging: CT Head 7/29 -- No acute intracranial abnormality  Microangiopathic changes  Chronic lacunar infarcts are noted  No hemorrhage  CTA head / neck 7/29 -- Severe focal stenosis distal right petrous/cavernous ICA junction  Moderate focal right supraclinoid ICA stenosis  No large vessel occlusion  No intracranial aneurysm identified  Mild ostial stenosis bilateral vertebral arteries  Moderate focal intracranial left vertebral stenosis  Mild left atherosclerotic calcification right carotid bifurcation    EKG: NSR rate 80-90s without ectopy  ED Treatment:   Medication Administration from 07/29/2018 1204 to 07/29/2018 1424       Date/Time Order Dose Route Action Action by Comments     07/29/2018 1222 iodixanol (VISIPAQUE) 320 MG/ML injection 85 mL 85 mL Intravenous Given Libertad shi not dcoumented by er     07/29/2018 1245 alteplase (ACTIVASE) bolus 9 mg 9 mg Intravenous Given Richard Martin RN      07/29/2018 1245 alteplase (ACTIVASE) infusion 81 mg 81 mg Intravenous Given Richard Martin RN      07/29/2018 1424 niCARdipine (CARDENE) 25 mg (STANDARD CONCENTRATION) in sodium chloride 0 9% 250 mL 2 5 mg/hr Intravenous New Bag Catalino Butler RN           Past Medical/Surgical History:    Active Ambulatory Problems     Diagnosis Date Noted    Type 2 diabetes mellitus with hyperglycemia ([facility] [address] ) 02/26/2016    Hyperlipidemia 02/26/2016    Diabetic foot ulcer ([facility] ) 10/24/2016    Essential hypertension 10/25/2016    Spinal cord stimulator status 10/25/2016    [facility], [facility] 10/29/2016    Type 2 diabetes mellitus with diabetic neuropathy, with long-term current use of insulin ([place] [address] ) 10/29/2016    Diabetic nephropathy associated with type 2 diabetes mellitus ([place] [address] ) 10/29/2016    CKD (chronic kidney disease) stage 3, GFR 30-59 ml/min 10/29/2016    Urine test positive for microalbuminuria 03/22/2018    Other proteinuria 03/28/2018    Microscopic hematuria 03/28/2018    Ulnar neuropathy at elbow of left upper extremity 04/16/2018     Past Medical History:   Diagnosis Date    Diabetes mellitus ([place] [address] )     Hypercholesteremia     Hyperlipidemia     Hypertension     Neuropathy     Obesity     Osteomyelitis ([place] [address] )     PVC's (premature ventricular contractions)        Admitting Diagnosis: CVA (cerebral vascular accident) ([place] [address] ) [I63 9]  Stroke ([place] [address] ) [I63 9]    Age/Sex: 62 y o  male    Assessment/Plan:   Assessment:    1  Acute CVA s/p TPA  2  Right sided hemiplegia  3  Asphasia  4  DM2 with hyperglycemia  5  CKD Stage III previous Cr 1 8 - March 2018  6  Hyperlipidemia        Plan:    Neuro: Acute CVA s/p TPA - place on stroke pathway, continue to closely monitor mental status and obtain STAT head CT with any acute change  Obtain MRI Brain as well as order repeat Head CT 24h post TPa administration  Begin ASA 24h post TPa admin as well as Statin; however at this time requires formal speech and swallow evaluation  Will defer Carotid duplex to neuro in the setting of CTA Head and Neck performed  Neurology following in consult      CV: continue telemetry monitoring, HD stable at this time, maintain goal systolic BP <332VR/JANKI with goal 160-180mm/hg  Initiate and titrate cardene gtt to maintain systolic BP goal   Obtain Lipid panel    Obtain ECHO       Pulm: continue SpO2 monitoring, maintain >92%     GI: NPO at this time, as patient does have speech difficulty post event, will obtain formal speech / swallow evaluation prior to providing oral intak     : strict I/O, U/O measures, monitor renal function     F/E/N: replete lytes prn     ID: no suspected infectious etiology, monitor temps     Heme: hold on chemical DVT PPx as patient administered TPa, apply bilateral SCDs  Follow up Hgb/Plt after 24h as avoid needlesticks     Endo: hyperglycemic on admission, check HgA1C as suspect patient poorly controlled  Could perform accu-cheks via samples obtained from IV site; however no figure sticks     Msk/Skin: reposition and turn q2h, monitor for breakdown     Disposition: admit to ICU on the [facility] service of Dr Raulito Stevenson, continue to closely monitor  Neurology following in consult     Counseling / Coordination of Care  Total time spent today 55 minutes  Greater than 50% of total time was spent with the patient and / or family counseling and / or coordination of care  A description of the counseling / coordination of care: review of EMR, discussion with ER attending as well as family and consulted service  Development of care and treatment plan      ______________________________________________________________________     VTE Pharmacologic Prophylaxis: Pharmacologic VTE Prophylaxis contraindicated due to administration of TPa  VTE Mechanical Prophylaxis: sequential compression device      Code Status: Level 1 - Full Code     Given critical illness, patient length of stay will require greater than two midnights          Admission Orders:  Admit to ICU  Telem  Npo  Neuro checks q1h [id number]  Consult neurology  Echo, MRI brain ordered  Fingerstick glucose checks qid w/ ssi  Monitor I&O's  IS q 1h while awake  SCD's  PT/OT/Speech evals    Scheduled Meds:   Current Facility-Administered Medications:  acetaminophen 650 mg Rectal Q4H PRN BRITTANY Frias   atorvastatin 40 mg Oral QPM Estephania Tellez PA-C   insulin lispro 1-6 Units Subcutaneous HS BRITTANY Mcdaniel   insulin lispro 2-12 Units Subcutaneous TID [facility] Donya Calle BRITTANY Gama   labetalol 10 mg Intravenous Q4H PRN Yenny Dominguez PA-C     Continuous Infusions:    PRN Meds:   acetaminophen    labetalol

## 2018-07-30 NOTE — PROGRESS NOTES
Progress Note - Neurology   Polina Ojeda 62 y o  male [id number]  Unit/Bed#: /    Assessment:  Polina Ojeda is a 62 y o  male with a history of poorly controlled DM2, CKD III, HTN, obesity who presented to [facility] on 7/29/18 with acute onset of right sided paresis, slurred speech, left ptosis, aphasia and mild confusion  tPA was given  Initial NIHSS 14, improved to 8 post tPA  Stroke work-up in progress  Plan:  Left basal ganglia infarct  Patent continues to have some right sided paresis, dysarthria, mild mixed expressive and receptive aphasia and left ptosis  Patient also with anisocoria with L pupil larger than R  Most of these symptoms can be explained by the basal ganglia infarct though the patient's left ptosis and anisocoria cannot be  Concern for multifocal strokes with an additional small infarct in the R MCA distribution  Will await MRI brain  Initial NIHSS 14, improved to 8 post tPA  Acute ischemic stroke protocol  tPA protocol  Ok to start Aspirin 81mg as CTH stable   Atorvastatin 40mg  Initial CTH revealed negative for acute abnormality, though chronic lacunar infarcts are noted  Repeat CT head 24 hours post tPA revealed evolving left basal ganglia infarct  No sign of hemorrhage  CTA of head and neck was negative for large vessel occlusion; however, it did reveal severe focal stenosis of the distal right petrous/cavernous ICA junction  moderate focal right supraclinoid ICA stenosis, mild ostial stenosis of the bilateral vertebral arteries, moderate focal intracranial left vertebral stenosis and mild left atherosclerotic calcification of the right carotid bifurcation  Donaldo Patel wo contrast pending  Spinal stimulator in place - which is MRI conditional  Echo with EF 55%, no RWMA, mild valvular regurgitation, normal sized atria bilaterally    Telemetry  Lipid panel: total 166, triglycerides 343, HDL 33, LDL 64  HbA1C 7 5  Secondary risk factor modification  Ok to transition to goal of normotension  Hyperglycemia control   PT/OT/ST  Stroke Education  Frequent neurological checks  Stat CT head with acute decline of in exam/mental status  Notify neurology with any changes in examination  The patient should follow-up with outpatient neurology  Communication has been sent to the office to schedule a follow-up appointment  Encephalopathy   At the present time he is able to follow one step commands  Because family and staff note abrupt confusional episodes will do EEG to evaluate for electrographic evidence of seizure, though this confusion may also be secondary to the patient's new infarcts and mixed aphasia in the setting of mild cognitive decline  Routine EEG ordered  CTH x 2 negative/stable  Frequent neurological checks  Stat CT head with acute decline of in exam/mental status    DM2  ISS    CKD III  As per primary team    Subjective: Adriano Dominguez is a 62 y o  male with a history of poorly controlled DM2, CKD III, HTN, obesity who presented to [facility] on 7/29/18 with acute onset of right sided paresis, slurred speech, left ptosis, aphasia and mild confusion  BP on admit was 173/84  Stroke alert was called  NIHSS 14 in the ED  Patient received tPA  S/p TIA patient's NIHSS improved to 8  Patient subsequently had one episode of increased confusion  A repeat CTH was completed which was negative  CTA was negative for large vessel occlusion, though revealed stenosis of both the anterior and posterior circulating systems  Repeat CTH 24 hours post tPA revealed evolving left basal ganglia infarct  Echo with EF 55%, no RWMA, mild valvular regurgitation, normal sized atria bilaterally  Of note the patient does have a spinal stimulator in place to assist with bowel movements  His son supplied by with the device information which is listed below  I did call [employer] and the recording stated that this model number is MRI conditional for MRI brain only      [employer] Inter-Stem Model 8722  SN: [id number]    Today on exam, the patient states he is doing ok  On my first interaction with his he was seated edge of bed and noted that he was very uncomfortable  He expressed that he was very anxious to get to the bedside chair  On my serial exam, patient denied complaint initially  On further exam, he did note that he had trouble making sense of words and trying to read them  Patient denies chest pain, shortness of breath, headache, vision changes, dizziness, and weakness or numbness of extremities  The patient's wife is at bedside  She notes that around 11am the patient became acutely confused calling her Ericka Ajay and asking where the peaches were  She also states that over the past 6 months she has also noticed that he has been increasingly forgetful  Past Medical History:   Diagnosis Date    [facility] mellitus [facility] )     Hypercholesteremia     Hyperlipidemia     Hypertension     Neuropathy     Obesity     [facility]     last assessed 11/4/16    PVC's (premature ventricular contractions)     sees cardiology Dr Soco camargo     Past Surgical History:   Procedure Laterality Date    ABDOMINAL SURGERY      CHOLECYSTECTOMY      Percutaneous    OTHER SURGICAL HISTORY      "stimulator to control bowel movements"    SC ESOPHAGOGASTRODUODENOSCOPY TRANSORAL DIAGNOSTIC N/A 9/27/2016    Procedure: ESOPHAGOGASTRODUODENOSCOPY (EGD); Surgeon: Tressa David MD;  [facility];   Service: Gastroenterology    SC LAP,CHOLECYSTECTOMY N/A 2/29/2016    Procedure: LAPAROSCOPIC CHOLECYSTECTOMY ;  Surgeon: Benny Brice DO;  Location[facility];  Service: General    ROTATOR CUFF REPAIR Right     TOE AMPUTATION Right 10/28/2016    Procedure: 3RD TOE AMPUTATION ;  Surgeon: Maria Victoria Cardona DPM;  [facility];  Service:      Family history:  Family History   Problem Relation Age of Onset    Leukemia Mother     Liver disease Mother     Lung cancer Mother         heavy smoker - 3 ppd    Heart disease Father     Liver disease Father     Multiple myeloma Sister     Breast cancer Sister     Urolithiasis Family     Alcohol abuse Neg Hx     Depression Neg Hx     Drug abuse Neg Hx     Substance Abuse Neg Hx        Social History     Social History    Marital status: /Civil Union     Spouse name: N/A    Number of children: N/A    Years of education: N/A     Occupational History    currently working      Social History Main Topics    Smoking status: Never Smoker    Smokeless tobacco: Never Used    Alcohol use No    Drug use: No    Sexual activity: Not Asked     Other Topics Concern    None     Social History Narrative    Daily caffeine consumption 2-3 servings a day           Scheduled Meds:  Current Facility-Administered Medications:  acetaminophen 650 mg Rectal Q4H PRN BRITTANY Costello   atorvastatin 40 mg Oral QPM Georgi Mcelroy PA-C   carvedilol 3 125 mg Oral Q12H BRITTANY Mcdaniel   insulin lispro 1-6 Units Subcutaneous HS BRITTANY Mcdaniel   insulin lispro 2-12 Units Subcutaneous TID AC BRITTANY Mcdaniel   labetalol 10 mg Intravenous Q4H PRN Georgi Mcelroy PA-C     Continuous Infusions:   PRN Meds:   acetaminophen    labetalol    ROS: A 12 point ROS was completed and other than the above mentioned complaints in the HPI, all remaining systems were negative  Vitals: /88 (BP Location: Right arm)   Pulse 79   Temp 98 3 °F (36 8 °C) (Oral)   Resp 20   Ht 5' 8" (1 727 m)   Wt 112 kg (247 lb 12 8 oz)   SpO2 92%   BMI 37 68 kg/m²     Physical Exam:   Physical Exam   Constitutional: He appears well-developed and well-nourished  No distress  Middle aged male, seated on edge of bed initially and then in bedside chair for subsequent examination   HENT:   Head: Normocephalic and atraumatic  Eyes: Conjunctivae and EOM are normal  Pupils are equal, round, and reactive to light  Right eye exhibits no discharge   Left eye exhibits no discharge  No scleral icterus  Neck: Normal range of motion  Neck supple  Cardiovascular: Normal rate, regular rhythm and normal heart sounds  Exam reveals no gallop and no friction rub  No murmur heard  Pulmonary/Chest: Effort normal and breath sounds normal  No stridor  No respiratory distress  He has no wheezes  He has no rales  He exhibits no tenderness  Musculoskeletal: He exhibits no edema, tenderness or deformity  Neurological: He is alert  Finger-nose-finger test: RUE Dysmetria  Patient also appearing to have difficulty finding my finger visually  Heel-to-shin test: Increased clumsiness of RLE compared to L  Reflex Scores:       Bicep reflexes are 2+ on the right side and 2+ on the left side  Brachioradialis reflexes are 2+ on the right side and 2+ on the left side  Patellar reflexes are Right patellar reflex grade: trace  and Left patellar reflex grade: trace     Skin: Skin is warm and dry  No rash noted  No erythema  Psychiatric:   Impulsive with decreased insight     Neurologic Exam     Mental Status   Attention: decreased  Alert  Oriented to person, hospital, day of the week and month  Not oriented to year  Patient does not know president, but per wife this would be baseline  Able to do calculations  Can spell WORLD forwards but not backwards  Increased difficulty with >1 step commands  When asked questions about his vision, patient responded incorrectly with a story about cars in a parking lot  Patient is dysarthric with some evidence of mixed aphasia  Cranial Nerves     CN III, IV, VI   Pupils are equal, round, and reactive to light  Extraocular motions are normal    Right pupil: Size: 2 mm  Shape: regular  Reactivity: brisk  Left pupil: Size: 4 mm  Shape: regular  Reactivity: brisk  Nystagmus: none   Diplopia: none  Ophthalmoparesis: none  Conjugate gaze: present    CN V   Facial sensation intact  CN VII   Facial expression full, symmetric       CN XI   CN XI normal      CN XII   CN XII normal    Patient seems to have difficulty with his vision, but no obvious visual fields deficit noted  Motor Exam   Muscle bulk: normal  Overall muscle tone: normal  Right arm pronator drift: present  Left arm pronator drift: absent    Strength   Strength 5/5 except as noted  RUE:  Deltoid 4+  Triceps 4   4+    RLE:  Iliopsoas 4+     Sensory Exam   Light touch normal    Right arm vibration: normal  Left arm vibration: normal  Right leg vibration: decreased from knee  Left leg vibration: decreased from knee  Pinprick intact and symmetric except for patient reporting decreased sensation to pin prick in RUE  Temperature intact throughout     Gait, Coordination, and Reflexes     Coordination   Finger-nose-finger test: RUE Dysmetria  Patient also appearing to have difficulty finding my finger visually  Heel-to-shin test: Increased clumsiness of RLE compared to L  Reflexes   Right brachioradialis: 2+  Left brachioradialis: 2+  Right biceps: 2+  Left biceps: 2+  Right patellar reflex grade: trace  Left patellar reflex grade: trace  Right plantar: normal  Left plantar: equivocal      Labs:  Recent Results (from the past 24 hour(s))   Fingerstick Glucose (POCT)    Collection Time: 07/29/18  3:55 PM   Result Value Ref Range    POC Glucose 138 65 - 140 mg/dl     Imaging: I have personally reviewed pertinent imaging and PACS reports  VTE Prophylaxis: Sequential compression device (Venodyne)     Total time spent today 45 minutes  Greater than 50% of total time was spent with the patient and / or family counseling and / or coordination of care   A description of the counseling / coordination of care: discussing presenting symptoms, patient's history of forgetfullness, recent imaging findings and symptoms, plan of care

## 2018-07-30 NOTE — PLAN OF CARE
Problem: SLP ADULT - SWALLOWING, IMPAIRED  Goal: Initial SLP swallow eval performed  Outcome: Completed Date Met: 07/30/18    Goal: Advance to least restrictive diet without signs or symptoms of aspiration for planned discharge setting  See evaluation for individualized goals  Patient will tolerate the safest and least restrictive diet without overt s/sx aspiration x100%    Outcome: Adequate for Discharge      Problem: SLP ADULT - COMMUNICATION, IMPAIRED  Goal: Initial communication eval performed  Outcome: Progressing    Goal: Demonstrates communication skills at highest level of function for planned discharge setting  See evaluation for individualized goals  1  Patient will independently communicate wants, needs, and ideas via speech or augmentative/alternative communication x100%  2   Patient will independently perform organizational/problem-solving tasks within ADLs x100%    Outcome: Progressing

## 2018-07-30 NOTE — PHYSICAL THERAPY NOTE
PHYSICAL THERAPY EVALUATION NOTE    Patient Name: Tiffany Gar  FVFVS'H Date: 7/30/2018  AGE:   62 y o  Mrn:   [id number]  ADMIT DX:  CVA (cerebral vascular accident) ([facility] ) [I63 9]  Stroke [facility]) [I63 9]    Past Medical History:   Diagnosis Date    Diabetes mellitus ([facility] )     Hypercholesteremia     Hyperlipidemia     Hypertension     Neuropathy     Obesity     Osteomyelitis ([place] [address] )     last assessed 11/4/16    PVC's (premature ventricular contractions)     sees cardiology Dr Soco camargo     Length Of Stay: 1  PHYSICAL THERAPY EVALUATION :    07/30/18 1534   Pain Assessment   Pain Assessment No/denies pain   Home Living   Type of [address] One level; Other (Comment)  (3 RAFAT )   Additional Comments lives w/ spouse  ambulated w/o device  no DME  independent w/ ADLs and driving  pt works full time as   Prior Function   Comments pt seen in bathroom w/ Karen NSG present  pt denied pain or dizziness  pt needed occasional input for task focus  pt states goal is to get back to work  pt's wife had to correct some items in social history  pt speech is garbled and occasionally difficult to understand  Restrictions/Precautions   Other Precautions Impulsive;Cognitive; Chair Alarm; Bed Alarm;Telemetry; Fall Risk   General   Additional Pertinent History 7/30/18 at 14:03, heart rate was 121 BPM and blood pressure 171/89  7/29/18 at 12:15, glucose was 212  Family/Caregiver Present Yes   Cognition   Overall Cognitive Status Impaired   Arousal/Participation Cooperative   Orientation Level Oriented to person;Oriented to place; Disoriented to time;Disoriented to situation; Other (Comment)  (pt was identified w/ full name and birth date)   Following Commands Follows one step commands with increased time or repetition   Comments room air resting pulse ox 96% and 87 BPM, active 94% and 95 BPM    RUE Assessment   RUE Assessment WFL  (4-/5)   LUE Assessment   LUE Assessment WFL  (4+/5)   RLE Assessment   RLE Assessment WFL  (4-/5)   LLE Assessment   LLE Assessment WFL  (4+/5)   Coordination   Movements are Fluid and Coordinated 0   Coordination and Movement Description ataxia noted of R UE and R LE  impaired coordination L UE and L LE  Light Touch   RLE Light Touch Grossly intact   LLE Light Touch Grossly intact   Transfers   Sit to Stand 2  Maximal assistance   Additional items Assist x 1; Increased time required; Impulsive;Verbal cues  (for LE positioning)   Stand to Sit 3  Moderate assistance   Additional items Assist x 1; Impulsive;Verbal cues  (for body positioning, controlled descent)   Ambulation/Elevation   Gait pattern Decreased R stance;Shuffling; Ataxia; Inconsistent lubna; Foward flexed   Gait Assistance 2  Maximal assist   Additional items Assist x 1;Verbal cues; Tactile cues  (for obstacle negotiation, posture, full step length)   Assistive Device None   Distance 6 feet  (additional not possible due to fatigue)   Stair Management Assistance Not tested  (due to poor ambulation tolerance)   Balance   Static Sitting Fair   Dynamic Sitting Poor   Static Standing Poor   Dynamic Standing Poor -   Ambulatory Poor -   Activity Tolerance   Activity Tolerance Patient limited by fatigue   Nurse Made Aware spoke to [facility]   Assessment   Prognosis Fair   Problem List Decreased strength;Decreased endurance; Impaired balance;Decreased mobility; Decreased coordination;Decreased cognition;Decreased safety awareness; Obesity   Assessment Pt presents with acute right sided paresis, slurred speech, mild confusion and aphasia, and left ptosis  Dx: acute ischemic CVA s/p tPA, hypertensive emergency, and proteinuria and CKD   order placed for PT eval and tx  pt presents w/ comorbidities of DM, hyperlipdiemia, HTN, neuropathy, obesity, right rotator cuff repair, right 3rd toe amputation, and osteomyelitis and personal factor of stair(s) to enter home  pt presents w/ weakness, decreased endurance, impaired cognition, impaired balance, gait deviations, impaired coordination, decreased safety awareness and fall risk  these impairments are evident in findings from physical examination (weakness and impaired coordination), mobility assessment (need for mod to max assist w/ all phases of mobility when usually mobilizing independently, tolerance to only 6 feet of ambulation and need for cueing for mobility technique), and Barthel Index: 35/100  pt needed input for task focus and mobility technique/safety  pt is at risk for falls due to physical and safety awareness deficits  pt's clinical presentation is unstable/unpredictable (evident in poor blood sugar control, tachycardia, poor blood pressure control, need for assist w/ all phases of mobility when usually mobilizing independently, tolerance to only 6 feet of ambulation and need for input for task focus and mobility technique/safety w/ limited carryover of education received)  pt needs inpatient PT tx to improve mobility deficits  discharge recommendation is for inpatient rehab to reduce fall risk and maximize level of functional independence  Pt would benefit from OT consult to address safety awareness and cognition  Goals   Patient Goals go back to work   STG Expiration Date 08/09/18   Short Term Goal #1 pt will:  Increase R LE strength 1/2 grade to facilitate independent mobility, Perform all bed mobility tasks w/ supervision to decrease fall risk factors, Perform all transfers w/ supervision to improve independence, Ambulate 150 ft  with least restrictive assistive device w/ minx1 w/o LOB to facilitate return to all daily functions including work as , Increase all balance 1 grade to decrease risk for falls, Complete exercise program independently to improve overall activity tolerance, Tolerate 3 hr OOB to faciliate upright tolerance and Improve Barthel Index score to 60 or greater to facilitate independence  PT to see when stair training is appropriate  Plan   Treatment/Interventions Functional transfer training;LE strengthening/ROM; Therapeutic exercise; Endurance training;Cognitive reorientation;Patient/family training;Equipment eval/education; Bed mobility;Gait training  (PT to see when stair training is appropriate)   PT Frequency Other (Comment)  (3 to 5x/week and 1x on weekend as able)   Recommendation   Recommendation Post acute IP rehab;OT consult   Modified Evangelina Scale   Modified Imperial Scale 4   Barthel Index   Feeding 5   Bathing 0   Grooming Score 0   Dressing Score 0   Bladder Score 10   Bowels Score 10   Toilet Use Score 5   Transfers (Bed/Chair) Score 5   Mobility (Level Surface) Score 0   Stairs Score 0   Barthel Index Score 35     Skilled PT recommended while in hospital and upon DC to progress pt toward treatment goals       Meet Muñoz, PT

## 2018-07-30 NOTE — PLAN OF CARE
Problem: Neurological Deficit  Goal: Neurological status is stable or improving  Interventions:  - Monitor and assess patient's level of consciousness, motor function, sensory function, and level of assistance needed for ADLs  - Monitor and report changes from baseline  Collaborate with interdisciplinary team to initiate plan and implement interventions as ordered  - Provide and maintain a safe environment  - Utilize seizure precautions  - Utilize fall precautions  - Utilize aspiration precautions  - Utilize bleeding precautions  Outcome: Progressing      Problem: Activity Intolerance/Impaired Mobility  Goal: Mobility/activity is maintained at optimum level for patient  Interventions:  - Assess and monitor patient  barriers to mobility and need for assistive/adaptive devices  - Assess patient's emotional response to limitations  - Collaborate with interdisciplinary team and initiate plans and interventions as ordered  - Encourage independent activity per ability   - Maintain proper body alignment  - Perform active/passive rom as tolerated/ordered  - Plan activities to conserve energy   - Turn patient  Outcome: Progressing      Problem: Communication Impairment  Goal: Ability to express needs and understand communication  Assess patient's communication skills and ability to understand information  Patient will demonstrate use of effective communication techniques, alternative methods of communication and understanding even if not able to speak  - Encourage communication and provide alternate methods of communication as needed  - Collaborate with case management/ for discharge needs  - Include patient/family/caregiver in decisions related to communication  Outcome: Progressing      Problem: Potential for Aspiration  Goal: Non-ventilated patient's risk of aspiration is minimized  Assess and monitor vital signs, respiratory status, and labs (WBC)    Monitor for signs of aspiration (tachypnea, cough, rales, wheezing, cyanosis, fever)  - Assess and monitor patient's ability to swallow  - Place patient up in chair to eat if possible  - HOB up at 90 degrees to eat if unable to get patient up into chair   - Supervise patient during oral intake  - Instruct patient to take small bites  - Instruct patient to take small single sips when taking liquids  - Follow patient-specific strategies generated by speech pathologist   Outcome: Progressing    Goal: Ventilated patient's risk of aspiration is minimized  Assess and monitor vital signs, respiratory status, airway cuff pressure, and labs (WBC)  Monitor for signs of aspiration (tachypnea, cough, rales, wheezing, cyanosis, fever)  - Elevate head of bed 30 degrees if patient has tube feeding   - Monitor tube feeding  Outcome: Progressing      Problem: Nutrition  Goal: Nutrition/Hydration status is improving  Monitor and assess patient's nutrition/hydration status for malnutrition (ex- brittle hair, bruises, dry skin, pale skin and conjunctiva, muscle wasting, smooth red tongue, and disorientation)  Collaborate with interdisciplinary team and initiate plan and interventions as ordered  Monitor patient's weight and dietary intake as ordered or per policy  Utilize nutrition screening tool and intervene per policy  Determine patient's food preferences and provide high-protein, high-caloric foods as appropriate  - Assist patient with eating   - Allow adequate time for meals   - Encourage patient to take dietary supplement as ordered  - Collaborate with clinical nutritionist   - Include patient/family/caregiver in decisions related to nutrition    Outcome: Progressing

## 2018-07-30 NOTE — CONSULTS
Consulted for DM and HTN diet education  Pt was not attentive to education, was more concerned with going to the bathroom and was irritated he could not get up by himself  Wife particiapted fully in education, and son arrived during discussion and also asked questions  Wife stated pt uses 1/2 of the contents of the salt shaker throughout the day to season his food  He also will go to the grocery store and buy chips, cheese curls, etc to snack on behind his wife's back  He also eats 2 large white potatoes with dinner  Wife states she tries to instruct him to change his diet, but he apparently gets angry with her  Discussed in depth the importance of minimizing salt intake as well as monitoring portion sizes in regards to CHO counting  Also discussed low sodium snacks that are also adequate options for diabetes management, as well as cooking techniques to reduce sodium  Wife was very engaged in education and asked appropriate questions  Handouts provided for at home use as well  Will continue to monitor po intake and any further diet education needs  Thank you

## 2018-07-30 NOTE — PROGRESS NOTES
Progress Note - Critical Care   Adriano Dominguez 62 y o  male MRN: [id number]  Unit/Bed#:  Encounter: [id number]    Attending Physician: Addie Chang MD      ______________________________________________________________________  Assessment and Plan:   Principal Problem:    CVA (cerebral vascular accident) [facility])  Active Problems:    Hemiplegia ([facility] [address] )    Dysarthria    Type 2 diabetes mellitus with hyperglycemia ([facility] [address] )    Hyperlipidemia    CKD (chronic kidney disease) stage 3, GFR 30-59 ml/min    Essential hypertension  Resolved Problems:    Aphasia        Neuro:  Acute CVA status post tPA 07/29/2018 at 12:45 p m  with aphasia/right-sided hemiplegia now improved  CTA with severe focal stenosis of the right ICA, moderate focal right supraclinoid ICA stenosis, moderate focal intracranial left vertebral stenosis  No large vessel occlusion -stroke pathway, fasting lipid profile 166/343/33/64, A1c 7 5, echo pending, PT/OT/speech eval pending, plan for MRI today if not repeat CT of the head in 24 hours at 1:00 p m , aspirin after repeat MRI, p m  labs today    CV:  Hypertension emergency-Cardene discontinued last p m , permissive hypertension with acute CVA, goal blood pressure is 160 to 180, consider resuming home Coreg in the next 24-48 hours pending neuro    Hyperlipidemia-statin    Pulm:  Room air, incentive spirometry, oxygen for saturations greater than 92%    GI:  NPO pending speech evaluation for stroke pathway    :  CKD stage 3 questionable new baseline of 1 8-2 2-currently around baseline    F/E/N:  No issues    ID:  No issues    Heme:  No issues    Endo:  Dm 2 blood sugar 138-223 A1c 7 5-start sliding scale insulin     Msk/Skin:  Pending PT/OT eval for stroke pathway    Disposition:  Follow up with p m  MRI, repeat labs, pending repeat imaging downgraded to Med surge telemetry    Code Status: Level 1 - Full Code    Counseling / Coordination of Care  Total time spent today 30 minutes   Greater than 50% of total time was spent with the patient and / or family counseling and / or coordination of care  A description of the counseling / coordination of care: Plan of care  ______________________________________________________________________    Chief Complaint:  Patient denies complaints of pain/shortness of breath/double vision/blurred vision at this time  States his movement of his right side is slightly improved  24 Hour Events:     Cardene discontinued last p m     Right-sided weakness improved this a m  Fagan Spinner Aphasia resolved  Review of Systems   Neurological: Positive for weakness  All other systems reviewed and are negative     ______________________________________________________________________    Physical Exam:   Physical Exam   Constitutional: No distress  HENT:   Head: Normocephalic and atraumatic  Mouth/Throat: Oropharynx is clear and moist    Eyes: Pupils are equal, round, and reactive to light  No scleral icterus  Left pytosis   Neck: Neck supple  No JVD present  Cardiovascular: Normal rate, regular rhythm, normal heart sounds and intact distal pulses  Exam reveals no gallop and no friction rub  No murmur heard  Pulmonary/Chest: Effort normal and breath sounds normal  No respiratory distress  He has no wheezes  He has no rales  Abdominal: Soft  Bowel sounds are normal  He exhibits no distension  There is no tenderness  There is no guarding  Musculoskeletal: He exhibits no edema  Neurological: He is alert  GCS eye subscore is 4  GCS verbal subscore is 4  GCS motor subscore is 6  Confused to time  Right pronator drift  Right-sided strength 4 to 5/5  Left facial droop  Left eye ptosis   Skin: Skin is warm and dry  No rash noted  No erythema  No pallor         ______________________________________________________________________  Vitals:    07/30/18 0405 07/30/18 0505 07/30/18 0605 07/30/18 0700   BP: (!) 179/95 162/77 165/81 166/78   BP Location:    Right arm   Pulse: 81 79 79 91   Resp: 20  19 16   Temp:       TempSrc:       SpO2: 96% 97% 97% 94%   Weight:       Height:           Temperature:   Temp (24hrs), Av 4 °F (36 9 °C), Min:97 9 °F (36 6 °C), Max:98 6 °F (37 °C)    Current Temperature: 98 6 °F (37 °C)  Weights:   IBW: 68 4 kg    Body mass index is 37 68 kg/m²  Weight (last 2 days)     Date/Time   Weight    18 1429  112 (247 8)    18 1226  113 (248 46)            Hemodynamic Monitoring:  N/A     Non-Invasive/Invasive Ventilation Settings:  Respiratory    Lab Data (Last 4 hours)    None         O2/Vent Data (Last 4 hours)    None              No results found for: PHART, RIX9OQH, PO2ART, OKM3HVX, Z7YHBYZA, BEART, SOURCE  SpO2: SpO2: 94 %  Intake and Outputs:  I/O       701 -  07 07 -  0700 701 -  0700    I V  (mL/kg)  1318 8 (11 8)     Total Intake(mL/kg)  1318 8 (11 8)     Urine (mL/kg/hr)  1550     Total Output   1550      Net   -231 3             Unmeasured Urine Occurrence  2 x           Nutrition:        Diet Orders            Start     Ordered    18 1444  Diet NPO  Diet effective now     Question Answer Comment   Diet Type NPO    RD to adjust diet per protocol?  Yes        18 1443    18 1435  Room Service  Once     Question:  Type of Service  Answer:  Room Service - Appropriate with Assistance    18 1434          Labs:     Results from last 7 days  Lab Units 18  1219 18  1215   WBC Thousand/uL  --  7 42   HEMOGLOBIN g/dL  --  13 3   I STAT HEMOGLOBIN g/dl 13 3  --    HEMATOCRIT %  --  39 7   PLATELETS Thousands/uL  --  171       Results from last 7 days  Lab Units 18  1219 18  1215 18  0712   SODIUM mmol/L  --  137 137   POTASSIUM mmol/L  --  4 4 4 5   CHLORIDE mmol/L  --  104 105   CO2 mmol/L  --  26 26   BUN mg/dL  --  27* 26*   CREATININE mg/dL  --  2 14* 2 37*   CALCIUM mg/dL  --  8 7 8 8   TOTAL PROTEIN g/dL  --   --  6 8   BILIRUBIN TOTAL mg/dL  --   --  0 40 ALK PHOS U/L  --   --  133*   ALT U/L  --   --  29   AST U/L  --   --  18   GLUCOSE RANDOM mg/dL  --  212*  --    GLUCOSE, ISTAT mg/dl 213*  --   --          No results found for: PHOS     Results from last 7 days  Lab Units 07/29/18  1215   INR  0 93   PTT seconds 31       0  Lab Value Date/Time   TROPONINI <0 02 07/29/2018 1216         ABG:No results found for: PHART, UIX2FUM, PO2ART, RTA3MED, N8YCQZMU, BEART, SOURCE  Imaging:  I have personally reviewed pertinent reports  and I have personally reviewed pertinent films in PACS  EKG:  Tele reviewed  Micro:  Lab Results   Component Value Date    BLOODCX No Growth After 5 Days  10/24/2016    BLOODCX No Growth After 5 Days  10/24/2016    URINECX No Growth <1000 cfu/mL 10/24/2016     Allergies: No Known Allergies  Medications:   Scheduled Meds:    Current Facility-Administered Medications:  acetaminophen 650 mg Rectal Q4H PRN BRITTANY Costello    atorvastatin 40 mg Oral QPM Georgi Mcelroy PA-C    labetalol 10 mg Intravenous Q4H PRN Georgi Mcelroy PA-C    niCARdipine 1-15 mg/hr Intravenous Titrated Bethanie MD Joni Last Rate: Stopped (07/29/18 2230)     Continuous Infusions:    niCARdipine 1-15 mg/hr Last Rate: Stopped (07/29/18 2230)     PRN Meds:    acetaminophen 650 mg Q4H PRN   labetalol 10 mg Q4H PRN     VTE Pharmacologic Prophylaxis: Status post tPA  VTE Mechanical Prophylaxis: sequential compression device  Invasive lines and devices: Invasive Devices     Peripheral Intravenous Line            Peripheral IV 07/29/18 Left Arm less than 1 day    Peripheral IV 07/29/18 Left Forearm less than 1 day                     Portions of the record may have been created with voice recognition software  Occasional wrong word or "sound a like" substitutions may have occurred due to the inherent limitations of voice recognition software  Read the chart carefully and recognize, using context, where substitutions have occurred      BRITTANY Greer

## 2018-07-30 NOTE — PLAN OF CARE
Problem: PHYSICAL THERAPY ADULT  Goal: Performs mobility at highest level of function for planned discharge setting  See evaluation for individualized goals  Treatment/Interventions: Functional transfer training, LE strengthening/ROM, Therapeutic exercise, Endurance training, Cognitive reorientation, Patient/family training, Equipment eval/education, Bed mobility, Gait training (PT to see when stair training is appropriate)          See flowsheet documentation for full assessment, interventions and recommendations  Prognosis: Fair  Problem List: Decreased strength, Decreased endurance, Impaired balance, Decreased mobility, Decreased coordination, Decreased cognition, Decreased safety awareness, Obesity  Assessment: Pt presents with acute right sided paresis, slurred speech, mild confusion and aphasia, and left ptosis  Dx: acute ischemic CVA s/p tPA, hypertensive emergency, and proteinuria and CKD  order placed for PT eval and tx  pt presents w/ comorbidities of DM, hyperlipdiemia, HTN, neuropathy, obesity, right rotator cuff repair, right 3rd toe amputation, and osteomyelitis and personal factor of stair(s) to enter home  pt presents w/ weakness, decreased endurance, impaired cognition, impaired balance, gait deviations, impaired coordination, decreased safety awareness and fall risk  these impairments are evident in findings from physical examination (weakness and impaired coordination), mobility assessment (need for mod to max assist w/ all phases of mobility when usually mobilizing independently, tolerance to only 6 feet of ambulation and need for cueing for mobility technique), and Barthel Index: 35/100  pt needed input for task focus and mobility technique/safety  pt is at risk for falls due to physical and safety awareness deficits   pt's clinical presentation is unstable/unpredictable (evident in poor blood sugar control, tachycardia, poor blood pressure control, need for assist w/ all phases of mobility when usually mobilizing independently, tolerance to only 6 feet of ambulation and need for input for task focus and mobility technique/safety w/ limited carryover of education received)  pt needs inpatient PT tx to improve mobility deficits  discharge recommendation is for inpatient rehab to reduce fall risk and maximize level of functional independence  Pt would benefit from OT consult to address safety awareness and cognition  Recommendation: Post acute IP rehab, OT consult          See flowsheet documentation for full assessment

## 2018-07-30 NOTE — OCCUPATIONAL THERAPY NOTE
Occupational Therapy Note        Patient Name: Katie IZQUIERDOU Date: 7/30/2018    OT orders and chart review completed  Pt currently in room w/ physician at bedside and door shut  Will continue to follow to complete eval as appropriate and schedule allows      Kaya Victor, OT

## 2018-07-31 ENCOUNTER — APPOINTMENT (INPATIENT)
Dept: NEUROLOGY | Facility: HOSPITAL | Age: 58
DRG: 061 | End: 2018-07-31
Attending: NURSE PRACTITIONER
Payer: COMMERCIAL

## 2018-07-31 PROBLEM — G93.40 ENCEPHALOPATHY: Status: ACTIVE | Noted: 2018-07-31

## 2018-07-31 PROBLEM — R80.8 OTHER PROTEINURIA: Status: RESOLVED | Noted: 2018-03-28 | Resolved: 2018-07-31

## 2018-07-31 LAB
ANION GAP SERPL CALCULATED.3IONS-SCNC: 6 MMOL/L (ref 4–13)
BASOPHILS # BLD AUTO: 0.02 THOUSANDS/ΜL (ref 0–0.1)
BASOPHILS NFR BLD AUTO: 0 % (ref 0–1)
BUN SERPL-MCNC: 26 MG/DL (ref 5–25)
CALCIUM SERPL-MCNC: 8.6 MG/DL (ref 8.3–10.1)
CHLORIDE SERPL-SCNC: 105 MMOL/L (ref 100–108)
CO2 SERPL-SCNC: 26 MMOL/L (ref 21–32)
CREAT SERPL-MCNC: 2.23 MG/DL (ref 0.6–1.3)
EOSINOPHIL # BLD AUTO: 0.12 THOUSAND/ΜL (ref 0–0.61)
EOSINOPHIL NFR BLD AUTO: 2 % (ref 0–6)
ERYTHROCYTE [DISTWIDTH] IN BLOOD BY AUTOMATED COUNT: 13.9 % (ref 11.6–15.1)
GFR SERPL CREATININE-BSD FRML MDRD: 31 ML/MIN/1.73SQ M
GLUCOSE SERPL-MCNC: 122 MG/DL (ref 65–140)
GLUCOSE SERPL-MCNC: 186 MG/DL (ref 65–140)
GLUCOSE SERPL-MCNC: 223 MG/DL (ref 65–140)
GLUCOSE SERPL-MCNC: 226 MG/DL (ref 65–140)
GLUCOSE SERPL-MCNC: 237 MG/DL (ref 65–140)
HCT VFR BLD AUTO: 37.3 % (ref 36.5–49.3)
HGB BLD-MCNC: 12.4 G/DL (ref 12–17)
LYMPHOCYTES # BLD AUTO: 1.92 THOUSANDS/ΜL (ref 0.6–4.47)
LYMPHOCYTES NFR BLD AUTO: 24 % (ref 14–44)
MCH RBC QN AUTO: 28.4 PG (ref 26.8–34.3)
MCHC RBC AUTO-ENTMCNC: 33.2 G/DL (ref 31.4–37.4)
MCV RBC AUTO: 85 FL (ref 82–98)
MONOCYTES # BLD AUTO: 0.78 THOUSAND/ΜL (ref 0.17–1.22)
MONOCYTES NFR BLD AUTO: 10 % (ref 4–12)
NEUTROPHILS # BLD AUTO: 5.05 THOUSANDS/ΜL (ref 1.85–7.62)
NEUTS SEG NFR BLD AUTO: 64 % (ref 43–75)
PLATELET # BLD AUTO: 176 THOUSANDS/UL (ref 149–390)
PMV BLD AUTO: 11.1 FL (ref 8.9–12.7)
POTASSIUM SERPL-SCNC: 4.2 MMOL/L (ref 3.5–5.3)
RBC # BLD AUTO: 4.37 MILLION/UL (ref 3.88–5.62)
SODIUM SERPL-SCNC: 137 MMOL/L (ref 136–145)
WBC # BLD AUTO: 7.89 THOUSAND/UL (ref 4.31–10.16)

## 2018-07-31 PROCEDURE — 82948 REAGENT STRIP/BLOOD GLUCOSE: CPT

## 2018-07-31 PROCEDURE — G8988 SELF CARE GOAL STATUS: HCPCS

## 2018-07-31 PROCEDURE — 80048 BASIC METABOLIC PNL TOTAL CA: CPT | Performed by: INTERNAL MEDICINE

## 2018-07-31 PROCEDURE — 99233 SBSQ HOSP IP/OBS HIGH 50: CPT | Performed by: PSYCHIATRY & NEUROLOGY

## 2018-07-31 PROCEDURE — 95816 EEG AWAKE AND DROWSY: CPT | Performed by: PSYCHIATRY & NEUROLOGY

## 2018-07-31 PROCEDURE — 99233 SBSQ HOSP IP/OBS HIGH 50: CPT | Performed by: NURSE PRACTITIONER

## 2018-07-31 PROCEDURE — 95816 EEG AWAKE AND DROWSY: CPT

## 2018-07-31 PROCEDURE — 85025 COMPLETE CBC W/AUTO DIFF WBC: CPT | Performed by: INTERNAL MEDICINE

## 2018-07-31 PROCEDURE — G8987 SELF CARE CURRENT STATUS: HCPCS

## 2018-07-31 PROCEDURE — 97167 OT EVAL HIGH COMPLEX 60 MIN: CPT

## 2018-07-31 PROCEDURE — 99357 PR PROLONGED SERV,INPATIENT,EA ADD 30 MIN: CPT | Performed by: PSYCHIATRY & NEUROLOGY

## 2018-07-31 RX ORDER — INSULIN ASPART 100 [IU]/ML
35 INJECTION, SUSPENSION SUBCUTANEOUS
Status: DISCONTINUED | OUTPATIENT
Start: 2018-07-31 | End: 2018-08-01

## 2018-07-31 RX ORDER — CARVEDILOL 6.25 MG/1
6.25 TABLET ORAL EVERY 12 HOURS
Status: DISCONTINUED | OUTPATIENT
Start: 2018-07-31 | End: 2018-08-01 | Stop reason: SDUPTHER

## 2018-07-31 RX ADMIN — INSULIN ASPART 35 UNITS: 100 INJECTION, SUSPENSION SUBCUTANEOUS at 16:59

## 2018-07-31 RX ADMIN — INSULIN LISPRO 4 UNITS: 100 INJECTION, SOLUTION INTRAVENOUS; SUBCUTANEOUS at 16:59

## 2018-07-31 RX ADMIN — ATORVASTATIN CALCIUM 40 MG: 40 TABLET, FILM COATED ORAL at 18:56

## 2018-07-31 RX ADMIN — INSULIN LISPRO 4 UNITS: 100 INJECTION, SOLUTION INTRAVENOUS; SUBCUTANEOUS at 11:37

## 2018-07-31 RX ADMIN — ASPIRIN 81 MG 81 MG: 81 TABLET ORAL at 08:01

## 2018-07-31 RX ADMIN — FAMOTIDINE 20 MG: 20 TABLET ORAL at 08:00

## 2018-07-31 RX ADMIN — CARVEDILOL 6.25 MG: 6.25 TABLET, FILM COATED ORAL at 21:48

## 2018-07-31 RX ADMIN — INSULIN LISPRO 4 UNITS: 100 INJECTION, SOLUTION INTRAVENOUS; SUBCUTANEOUS at 07:29

## 2018-07-31 NOTE — ASSESSMENT & PLAN NOTE
Lab Results   Component Value Date    HGBA1C 7 5 (H) 07/28/2018       Recent Labs      07/30/18   1406  07/30/18   1720  07/30/18   2104  07/31/18   0726   POCGLU  174*  188*  170*  223*       Blood Sugar Average: Last 72 hrs:  (P) 051 [id number]     · Increase sliding scale insulin

## 2018-07-31 NOTE — OCCUPATIONAL THERAPY NOTE
[address]  Evaluation     Patient Name: Becky Jason  TXLCK'G Date: 7/31/2018  Problem List  Patient Active Problem List   Diagnosis    Type 2 diabetes mellitus with hyperglycemia ([facility] [address] )    Hyperlipidemia    Essential hypertension    Spinal cord stimulator status    Type 2 diabetes mellitus with diabetic neuropathy, with long-term current use of insulin ([facility] [address] )    Diabetic nephropathy associated with type 2 diabetes mellitus (HCC)    CKD (chronic kidney disease) stage 3, GFR 30-59 ml/min    Urine test positive for microalbuminuria    Microscopic hematuria    Ulnar neuropathy at elbow of left upper extremity    CVA (cerebral vascular accident) ([facility] [address] )    Hemiplegia ([facility] [address] )    Dysarthria    Encephalopathy     Past Medical History  Past Medical History:   Diagnosis Date    Diabetes mellitus ([facility] [address] )     Hypercholesteremia     Hyperlipidemia     Hypertension     Neuropathy     Obesity     Osteomyelitis ([facility] [address] )     last assessed 11/4/16    PVC's (premature ventricular contractions)     sees cardiology Dr Terell camargo     Past Surgical History  Past Surgical History:   Procedure Laterality Date    ABDOMINAL SURGERY      CHOLECYSTECTOMY      Percutaneous    OTHER SURGICAL HISTORY      "stimulator to control bowel movements"    ID ESOPHAGOGASTRODUODENOSCOPY TRANSORAL DIAGNOSTIC N/A 9/27/2016    Procedure: ESOPHAGOGASTRODUODENOSCOPY (EGD); Surgeon: Whitney Solomon MD;  [facility]; Service: Gastroenterology    ID LAP,CHOLECYSTECTOMY N/A 2/29/2016    Procedure: LAPAROSCOPIC CHOLECYSTECTOMY ;  Surgeon: Mary Grace Lara DO;  [facility];  Service: General    ROTATOR CUFF REPAIR Right     TOE AMPUTATION Right 10/28/2016    Procedure: 3RD TOE AMPUTATION ;  Surgeon: Mara Mcginnis DPM;  [facility];  Service:       07/31/18 0818   Note Type   Note type Eval only   Restrictions/Precautions   Weight Bearing Precautions Per Order No   Other Precautions Cognitive; Bed Alarm;Chair Alarm;Multiple lines;Telemetry; Fall Risk   Pain Assessment   Pain Assessment No/denies pain   Pain Score No Pain   Home Living   Type of [address] One level   Bathroom Shower/Tub Tub/shower unit   Bathroom Equipment Grab bars in shower;Built-in shower seat;Grab bars around toilet   Bathroom Accessibility Accessible   Additional Comments Pt and pt wife detailed that the home is completely handicap accessible one 1 level  Prior Function   Level of [place] Independent with ADLs and functional mobility   Lives With Spouse; Family   ADL Assistance Independent   IADLs Independent   Falls in the last 6 months 0   Vocational Full time employment   Comments Pt is I w/ I/ADLs at baseline and receives no help attending to self  Pt does not cook or clean but does drive at baseline  Lifestyle   Autonomy Pt is I w/ I/ADLs at baseline and currently works  Pt has been working for over 30 years  Reciprocal Relationships Pt lives w/ wife and children who are old enough to contribute to care if needed   Service to Others Works as a    Intrinsic Gratification Reported to enjoy nothing outside of working  ADL   Where Assessed Chair   Eating Assistance 5  Supervision/Setup   Eating Deficit Supervision/safety; Increased time to complete;Setup  (Pt was eating breakfast in chair w/ no physical A)   Grooming Assistance 5  Supervision/Setup   Grooming Deficit Setup;Supervision/safety; Increased time to complete  (Pt managed napkin and wiped mouth during breakfast)   UB Dressing Assistance 6  Modified independent   UB Dressing Deficit Setup;Supervision/safety; Increased time to complete  (Pt demonstrate ROM/MMT to complete when seated)   LB Dressing Assistance 3  Moderate Assistance   LB Dressing Deficit Setup; Increased time to complete; Thread LLE into pants; Don/doff R sock; Don/doff L sock;Pull up over hips; Fasteners   Functional Assistance 3  Moderate Assistance   Functional Deficit Increased time to complete;Setup;Verbal cueing;Supervision/safety; Impulsive;Steadying   Additional Comments Pt don socks and pants while seated at recliner chair  Pt required mod A for both processess  Pt had most difficulty w/ threading L LE into pants and was close to max A to don socks  Pt's wife fastened pants button, to pull pants over hips pt benefited from max VC and was mod x 1 for sit > stand transfer  Bed Mobility   Additional Comments Pt presented in recliner chair upon eval, and returned to recliner chair post eval  Chair alarm activated, call bell in reach, needs met, and wife present  Transfers   Sit to Stand 3  Moderate assistance   Additional items Assist x 1; Armrests; Increased time required; Impulsive;Verbal cues   Stand to Sit 3  Moderate assistance   Additional items Assist x 1; Increased time required; Impulsive;Verbal cues;Armrests  (Pt benefited from max VC and A to control descend to chair )   Functional Mobility   Functional Mobility 3  Moderate assistance   Additional Comments Pt navigated w/ in room from recliner chair to armrest chair at the foot of the bed, and back to recliner chair  Pt impulsive and needed VC to prioritize safety and for RW management     Additional items Rolling walker   Balance   Static Sitting Fair   Dynamic Sitting Poor +   Static Standing Poor +   Activity Tolerance   Activity Tolerance Patient tolerated treatment well   Medical Staff Made Aware BABAK Weinberg   Nurse Made Aware THIERNO MCDANIEL Assessment   RUE Assessment WFL   RUE Strength   RUE Overall Strength Within Functional Limits - strength 5/5   LUE Assessment   LUE Assessment WFL   LUE Strength   LUE Overall Strength Within Functional Limits - strength 5/5   Hand Function   Gross Motor Coordination Impaired  (Dysmetric movement and overshooting finger to nose)   Fine Motor Coordination Impaired  (Slow/dysmetric opposition)   Sensation   Light Touch No apparent deficits   Proprioception   Proprioception No apparent deficits   Vision-Basic Assessment   Current Vision Wears glasses all the time   Vision - Complex Assessment   Ocular Range of Motion WFL   Head Position WDL   Tracking Requires cues, head turns, or add eye shifts to track  (Pt unable to maintain fixed head position )   Acuity Able to read clock/calendar on wall without difficulty   Additional Comments Pt reported no changes in vision  Pt inconsistently followed directions during vision tests  When testing peripheral fields, pt did not maintain gaze on in front focal point  Vision will be further assessed  Perception   Inattention/Neglect Appears intact   Motor Planning Appears intact   Perseveration Not present   Cognition   Overall Cognitive Status Unable to assess   Arousal/Participation Alert; Cooperative   Attention Within functional limits   Orientation Level Oriented to person;Oriented to place   Memory Decreased short term memory   Following Commands Follows one step commands inconsistently   Comments Pt identified by full name and   Pt able to engage in limited conversation  Wife (present) spoke for pt at times and lead responses in home detail  Wife also prompted pt to give specific answers   Assessment   Limitation Decreased ADL status; Decreased Safe judgement during ADL;Decreased cognition;Decreased endurance;Decreased high-level ADLs; Decreased fine motor control   Assessment Pt is a 62 yom [facility] for a CVA, CT scan had an impression of a positive CVA w/ ischemic change to the L basal ganglia  HPI: Pt was experiencing R sided weakness and R sided facial droop (sudden onset)  Pt presents w/ the following PMH impacting occupational performance: DM, hypercholesteremia, HLD, HTN, neuropathy, obesity, PVC, osteomyelitis  Pt reports living 1 SH w/ 1 RAFAT and the entire home is handicap accessible, pt lives w/ wife and children  PTA pt used no AD for functional mobility  Pt reports I w I/ADLs at baseline   Upon evaluation, pt was A&O x 2, pt was able to engage in limited conversation due to language barrier, wife was present and able to respond to all question  Pt was unable to recall precautions and followed one step directions inconsistently  Pt required mod A x 1 for all completed transfers and needed max VC for safety and precautions  Pt navigated w/ in room from recliner chair to armrest chair w/ mod A x 1, RW, and VC for pacing  During LB dressing pt completed w/ mod A x 1  Pt required most A during LB to don socks, pt had 2 trials to don socks (seated crossed legs method)  Pt wife started sock and pt pulled over heel  Pt presents w/ decreased standing tolerance, standing balance (dynamic and static), dynamic seated balance, safe judgement, endurance, and FMC; impacting I w/ functional mobility/transfers, safe functional transfers, LB dressing, toileting, bathing, community mobility, and grooming  Pt completed Neuro QOL Depression and Anxiety Screen w/ a total score of 16 (8 for each screen)  While in acute care pt would benefit from OT services to address deficit areas  From an OT perspective, pt would benefit from post acute rehab when medically stable for discharge from acute care to return to [facility] and work  Will continue to follow   Goals   Patient Goals "To listen to my Italian tapes" (in tape recorder)   Plan   Treatment Interventions ADL retraining;Functional transfer training; Endurance training;Cognitive reorientation; Fine motor coordination activities   Goal Expiration Date 08/07/18   OT Frequency 3-5x/wk   Recommendation   OT Discharge Recommendation (Post Acute Rehab)   Equipment Recommended (RW)   OT - OK to Discharge (When medically stable)   Barthel Index   Feeding 10   Bathing 0   Grooming Score 5   Dressing Score 5   Bladder Score 10   Bowels Score 10   Toilet Use Score 5   Transfers (Bed/Chair) Score 5   Mobility (Level Surface) Score 0   Stairs Score 0   Barthel Index Score 50   Modified [place] Scale   Modified [place] Scale 4   Neuro QOL Depression Screen - In the Past 7 Days:   I felt depressed 1   I felt hopeless 1   I felt like nothing could cheer me up 1   I felt that my life was empty 1   I felt worthless 1   I felt unhappy 1   I felt I had no reason for living 1   I felt that nothing was interesting 1   Depression Score 8   Neuro QOL Anxiety Screen - In the Past 7 Days:   I felt uneasy 1   I felt nervous 1   Many situations made me worry 1   My worries overwhelmed me 1   I felt tense 1   I had difficulty calming down 1   I had sudden feelings of panic 1   I felt nervous when my normal routine was disturbed 1   Anxiety Score 8        Pt goals to be met within 7 days:     1  Pt will be able to complete functional transfers <> bed and chair w/ mod I and least restrictive device + no VC in order to max I in the home environment and optimize safety  2  Pt will complete functional transfer <> toilet w/ mod I and least restrictive device during toileting  3  Pt will display increased functional static standing tolerance to 10 minutes w/ S during oral hygiene at the sink and least restrictive device in order to max level of (I) upon d/c   4  Pt will demonstrate Fair + functional static standing balance w/ mod I and least restrictive device    during UB dressing in order to ensure safety in the up d/c  5  Pt will demonstrate good carryover of safety precautions during transfers, needing min VC in order to maintain safety in the home upon d/c  6  Pt will demonstrate increased coordination during finger to nose touch, needing min VC and accurately touching therapist's fingers 3/3 times in order to manage cassette tapes upon d/c home  7   Pt will demonstrate increased coordination during finger opposition, needing min VC and accurately touching fingertips 4/4 times in order to manage cassette tapes upon d/c home    8  Pt will consistently follow one step directions through session w/ min VC to assist in reorientation to higher-level ADLs upon d/c   9  Pt will demonstrate good attention and participation in continued evaluation to assess vision to assist in safe discharge planning        TREVOR Boss

## 2018-07-31 NOTE — ASSESSMENT & PLAN NOTE
· Normalized white BP is goal blood pressure 120 to 140  · Continue home Coreg however increase, would continue to hold on ACE-inhibitor given the creatinine is slightly rising from 01/08 to 2 2 have outpatient follow-up with Nephrology

## 2018-07-31 NOTE — ASSESSMENT & PLAN NOTE
· Acute left basal ganglia CVA  · CTA with severe focal stenosis of the distal right petrous/cavernous ICA junction, moderate focal right supraclinoid ICA stenosis, mild ostial stenosis of the bilateral vertebral arteries, moderate focal intracranial left vertebral stenosis and mild left atherosclerotic calcification of the right carotid bifurcation  · Neurology following  · Stroke pathway  · PT recommending inpatient rehab/awaiting OT consult  · Speech regular diet thin liquids  · Echo with an EF of 89% grade 1 diastolic dysfunction  · L8G 7 5  · Fasting lipid profile 166/343/33/64  · MRI pending availability of spinal stimulator control are  Neurology is attempting a to reach out to rep again    · Normalized blood pressures, Coreg restarted yesterday, per Dr Kathy Puente outpatient note likely would benefit from ACE-inhibitor given stable creatinine will await outpatient follow-up given creatinine rising to 2 2  · Nutritional information given to family given patient's confusion

## 2018-07-31 NOTE — ASSESSMENT & PLAN NOTE
Lab Results   Component Value Date    HGBA1C 7 5 (H) 07/28/2018       Recent Labs      07/30/18   1406  07/30/18   1720  07/30/18   2104  07/31/18   0726   POCGLU  174*  188*  170*  223*       Blood Sugar Average: Last 72 hrs:  (P) 347 [id number]     Add back home 70 30  Continue sliding scale insulin

## 2018-07-31 NOTE — SOCIAL WORK
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  Patient drives himself  CM reviewed PT/OT recommendations of STR  Patient and family are agreeable to referrals to 1) [facility] and 2) [facility]  CM reviewed the different levels of rehab with family and they understand  CM reviewed discharge planning process including the following: identifying caregivers at home, preference for d/c planning needs, [facility] to [facility] program, availability of treatment team to discuss questions or concerns patient and/or family may have regarding diagnosis, plan of care, old or new medications and discharge planning  CM name and role reviewed  CM will continue to follow for care coordination and update assessment as necessary

## 2018-07-31 NOTE — PROGRESS NOTES
Progress Note - Neurology   Alicia Platt 62 y o  male [id number]  Unit/Bed#: /    Assessment:  Alicia Platt is a 62 y o  male with a history of poorly controlled DM2, CKD III, HTN, obesity who presented to [facility] on 7/29/18 with acute onset of right sided paresis, slurred speech, left ptosis, aphasia and mild confusion  tPA was given  Initial NIHSS 14, improved to 8 post tPA  Stroke work-up in progress  Plan:  Left basal ganglia infarct  Patent continues to have some right sided paresis, dysarthria, mild mixed expressive and receptive aphasia, left ptosis and anisocoria L>R; however R sided strength has improved somewhat  Most of these symptoms can be explained by the basal ganglia infarct though due to his mixed aphasia, left ptosis and anisocoria a concern for multifocal strokes exists  Additionally the patient's lower cognitive reserve due to underlying cognitive impairment may also be playing a role  Will await MRI brain  Reviewed notes in chart stating that family misplaced the remote to deactivate the device  [employer] rep, Miki Camacho, is on vacation  I spoke with Miki Camacho ([phone])  who was able to speak with the colorectal office  Family was able to  remote from the colorectal office and has brought it to the hospital  Plan at this time is for MRI brain at 1145 tomorrow  Neurology will be present immediately prior to MRI to deactivate the device  Initial NIHSS 14, improved to 8 post tPA  Acute ischemic stroke protocol  tPA protocol  Aspirin 81mg   Atorvastatin 40mg  Initial CTH revealed negative for acute abnormality, though chronic lacunar infarcts are noted  Repeat CT head 24 hours post tPA revealed evolving left basal ganglia infarct  No sign of hemorrhage  CTA of head and neck was negative for large vessel occlusion; however, it did reveal severe focal stenosis of the distal right petrous/cavernous ICA junction   moderate focal right supraclinoid ICA stenosis, mild ostial stenosis of the bilateral vertebral arteries, moderate focal intracranial left vertebral stenosis and mild left atherosclerotic calcification of the right carotid bifurcation  Vonjerzyle Felt wo contrast pending  Interstem in place - which is MRI conditional  Echo with EF 55%, no RWMA, mild valvular regurgitation, normal sized atria bilaterally  Telemetry  Lipid panel: total 166, triglycerides 343, HDL 33, LDL 64  HbA1C 7 5  Secondary risk factor modification  Ok to transition to goal of normotension  Hyperglycemia control   PT/OT/ST  Stroke Education  Frequent neurological checks  Stat CT head with acute decline of in exam/mental status  Notify neurology with any changes in examination  The patient should follow-up with outpatient neurology  Communication has been sent to the office to schedule a follow-up appointment  Encephalopathy   Patient is able to follow multistep commands today, much improved from yesterday; however wife continues to report episodes of intermittent confusion  As stated about it is unclear how much the patient's likely underlying cognitive impairment may be playing a role  EEG did reveal slowing in the left temporal region (consistent with the patient's known infarct), but also mentioned right temporal slowing suggestive of additional areas of focal cerebral dysfunction  A single left temporal sharp was also noted raising the possibility of epileptic potential but not a definite indication of increased seizure risk  Will hold off on initiating AEDs at this time and monitor closely  CTH x 2 negative/stable  Frequent neurological checks  Stat CT head with acute decline of in exam/mental status    DM2  ISS    CKD III  As per primary team    Subjective:    Tommy Menjivar is a 62 y o  male with a history of poorly controlled DM2, CKD III, HTN, obesity and a spinal stimulator in place [employer] Inter-Stem Model 5399 SN: [id number]) who presented to [address] on 7/29/18 with acute onset of right sided paresis, slurred speech, left ptosis, aphasia and mild confusion  BP on admit was 173/84  NIHSS 14 in the ED  Patient received tPA  S/p TIA patient's NIHSS improved to 8  Patient subsequently had one episode of increased confusion and then had another episode of confusion the following day  Repeat CTH's have been stable, though revealed an evolving left basal ganglia stroke  CTA was negative for large vessel occlusion, though revealed stenosis of both the anterior and posterior circulating systems  Echo with EF 55%, no RWMA, mild valvular regurgitation, normal sized atria bilaterally  Today on exam, the patient states he is doing well  He denies confusion  He has no complaints  The patient's wife did state that earlier today the patient had another episode of confusion where he was telling her to go home because their son got in an accident  A little while later he was acting appropriately again  Past Medical History:   Diagnosis Date    Diabetes mellitus ([facility] [address] )     Hypercholesteremia     Hyperlipidemia     Hypertension     Neuropathy     Obesity     Osteomyelitis ([facility] [address] )     last assessed 11/4/16    PVC's (premature ventricular contractions)     sees cardiology Dr Arminda camargo     Past Surgical History:   Procedure Laterality Date    ABDOMINAL SURGERY      CHOLECYSTECTOMY      Percutaneous    OTHER SURGICAL HISTORY      "stimulator to control bowel movements"    UT ESOPHAGOGASTRODUODENOSCOPY TRANSORAL DIAGNOSTIC N/A 9/27/2016    Procedure: ESOPHAGOGASTRODUODENOSCOPY (EGD); Surgeon: Willem Story MD;  [facility];   Service: Gastroenterology    UT LAP,CHOLECYSTECTOMY N/A 2/29/2016    Procedure: LAPAROSCOPIC CHOLECYSTECTOMY ;  Surgeon: Remy Scott DO;  Location[facility];  Service: General    ROTATOR CUFF REPAIR Right     TOE AMPUTATION Right 10/28/2016    Procedure: 3RD TOE AMPUTATION ;  Surgeon: Awais Caal DPM;  [facility];  Service:      Family history:  Family History   Problem Relation Age of Onset    Leukemia Mother     Liver disease Mother     Lung cancer Mother         heavy smoker - 3 ppd    Heart disease Father     Liver disease Father     Multiple myeloma Sister     Breast cancer Sister     Urolithiasis Family     Alcohol abuse Neg Hx     Depression Neg Hx     Drug abuse Neg Hx     Substance Abuse Neg Hx        Social History     Social History    Marital status: /Civil Union     Spouse name: N/A    Number of children: N/A    Years of education: N/A     Occupational History    currently working      Social History Main Topics    Smoking status: Never Smoker    Smokeless tobacco: Never Used    Alcohol use No    Drug use: No    Sexual activity: Not Asked     Other Topics Concern    None     Social History Narrative    Daily caffeine consumption 2-3 servings a day           Scheduled Meds:    Current Facility-Administered Medications:  acetaminophen 650 mg Rectal Q4H PRN Stock Penta, LESLINP   aspirin 81 mg Oral Daily Faina Gama, LESLINP   atorvastatin 40 mg Oral QPM Mark Anthony Brush PA-C   carvedilol 3 125 mg Oral Q12H Faina Stezenwagner, LESLINP   famotidine 20 mg Oral Daily FainaBRITTANY Fontanez   insulin lispro 1-6 Units Subcutaneous HS Faina Gama, LESLINP   insulin lispro 2-12 Units Subcutaneous TID AC LESLI McdanielNP   labetalol 10 mg Intravenous Q4H PRN Mar kAnthony Brush PA-C     Continuous Infusions:   PRN Meds:   acetaminophen    labetalol    ROS: A 12 point ROS was completed and other than the above mentioned complaints in the HPI, all remaining systems were negative  Vitals: /81 (BP Location: Right arm)   Pulse 77   Temp 98 2 °F (36 8 °C) (Oral)   Resp 22   Ht 5' 8" (1 727 m)   Wt 112 kg (247 lb 12 8 oz)   SpO2 95%   BMI 37 68 kg/m²     Physical Exam:   Physical Exam   Constitutional: He appears well-developed and well-nourished  No distress     Middle aged male, supine in bed   HENT:   Head: Normocephalic and atraumatic  Eyes: Conjunctivae and EOM are normal  Pupils are equal, round, and reactive to light  Right eye exhibits no discharge  Left eye exhibits no discharge  No scleral icterus  Neck: Normal range of motion  Neck supple  Cardiovascular: Normal rate, regular rhythm and normal heart sounds  Exam reveals no gallop and no friction rub  No murmur heard  Pulmonary/Chest: Effort normal and breath sounds normal  No stridor  No respiratory distress  He has no wheezes  He has no rales  He exhibits no tenderness  Musculoskeletal: He exhibits no edema, tenderness or deformity  Neurological: He is alert  Finger-nose-finger test: RUE Dysmetria  Heel-to-shin test: RLW clumsiness consistent with proximal weakness  Skin: Skin is warm and dry  No rash noted  No erythema  Psychiatric:   Cooperative  Decreased insight     Neurologic Exam     Mental Status   Attention: decreased  Alert  Oriented to person, and hospital  Needing cuing to remember [facility]  Patient able to state year when given three choice  Unable to state month, but knows season  Able to do calculations  Not able to spell WORLD backwards  Patient is dysarthric with some evidence of mixed aphasia, though improved from yesterday  Needed cuing to name all objects on stroke cards  Cranial Nerves     CN III, IV, VI   Pupils are equal, round, and reactive to light  Extraocular motions are normal    Nystagmus: none   Diplopia: none  Ophthalmoparesis: none  Conjugate gaze: present    CN XII   Tongue: not atrophic  Fasciculations: absent  Tongue deviation: right  L ptosis  Anisocoria L>R  When tested unilaterally patient 100% accurate with visual fields; however, in both superior and inferior fields when stimuli occurring bilaterally patient only able to identify it on the left       Motor Exam   Muscle bulk: normal  Overall muscle tone: normal  Right arm pronator drift: present  Left arm pronator drift: absent    Strength   Strength 5/5 except as noted  RUE:  Deltoid 5-  Triceps 5-   4+    RLE:  Iliopsoas 4+     Sensory Exam   Light touch normal      Gait, Coordination, and Reflexes     Coordination   Finger-nose-finger test: RUE Dysmetria  Heel-to-shin test: RLW clumsiness consistent with proximal weakness        Labs:  Recent Results (from the past 24 hour(s))   Fingerstick Glucose (POCT)    Collection Time: 07/30/18  2:06 PM   Result Value Ref Range    POC Glucose 174 (H) 65 - 140 mg/dl   CBC and differential    Collection Time: 07/30/18  2:08 PM   Result Value Ref Range    WBC 7 64 4 31 - 10 16 Thousand/uL    RBC 4 82 3 88 - 5 62 Million/uL    Hemoglobin 13 8 12 0 - 17 0 g/dL    Hematocrit 41 6 36 5 - 49 3 %    MCV 86 82 - 98 fL    MCH 28 6 26 8 - 34 3 pg    MCHC 33 2 31 4 - 37 4 g/dL    RDW 13 9 11 6 - 15 1 %    MPV 10 4 8 9 - 12 7 fL    Platelets 165 159 - 436 Thousands/uL    Neutrophils Relative 66 43 - 75 %    Lymphocytes Relative 24 14 - 44 %    Monocytes Relative 7 4 - 12 %    Eosinophils Relative 2 0 - 6 %    Basophils Relative 0 0 - 1 %    Neutrophils Absolute 5 07 1 85 - 7 62 Thousands/µL    Lymphocytes Absolute 1 85 0 60 - 4 47 Thousands/µL    Monocytes Absolute 0 56 0 17 - 1 22 Thousand/µL    Eosinophils Absolute 0 14 0 00 - 0 61 Thousand/µL    Basophils Absolute 0 02 0 00 - 0 10 Thousands/µL   Basic metabolic panel    Collection Time: 07/30/18  2:08 PM   Result Value Ref Range    Sodium 139 136 - 145 mmol/L    Potassium 4 5 3 5 - 5 3 mmol/L    Chloride 105 100 - 108 mmol/L    CO2 30 21 - 32 mmol/L    Anion Gap 4 4 - 13 mmol/L    BUN 23 5 - 25 mg/dL    Creatinine 2 26 (H) 0 60 - 1 30 mg/dL    Glucose 167 (H) 65 - 140 mg/dL    Calcium 9 2 8 3 - 10 1 mg/dL    eGFR 31 ml/min/1 73sq m   Fingerstick Glucose (POCT)    Collection Time: 07/30/18  5:20 PM   Result Value Ref Range    POC Glucose 188 (H) 65 - 140 mg/dl   Fingerstick Glucose (POCT)    Collection Time: 07/30/18  9:04 PM   Result Value Ref Range    POC Glucose 170 (H) 65 - 140 mg/dl   Basic metabolic panel    Collection Time: 07/31/18  4:26 AM   Result Value Ref Range    Sodium 137 136 - 145 mmol/L    Potassium 4 2 3 5 - 5 3 mmol/L    Chloride 105 100 - 108 mmol/L    CO2 26 21 - 32 mmol/L    Anion Gap 6 4 - 13 mmol/L    BUN 26 (H) 5 - 25 mg/dL    Creatinine 2 23 (H) 0 60 - 1 30 mg/dL    Glucose 186 (H) 65 - 140 mg/dL    Calcium 8 6 8 3 - 10 1 mg/dL    eGFR 31 ml/min/1 73sq m   CBC and differential    Collection Time: 07/31/18  4:26 AM   Result Value Ref Range    WBC 7 89 4 31 - 10 16 Thousand/uL    RBC 4 37 3 88 - 5 62 Million/uL    Hemoglobin 12 4 12 0 - 17 0 g/dL    Hematocrit 37 3 36 5 - 49 3 %    MCV 85 82 - 98 fL    MCH 28 4 26 8 - 34 3 pg    MCHC 33 2 31 4 - 37 4 g/dL    RDW 13 9 11 6 - 15 1 %    MPV 11 1 8 9 - 12 7 fL    Platelets 552 463 - 284 Thousands/uL    Neutrophils Relative 64 43 - 75 %    Lymphocytes Relative 24 14 - 44 %    Monocytes Relative 10 4 - 12 %    Eosinophils Relative 2 0 - 6 %    Basophils Relative 0 0 - 1 %    Neutrophils Absolute 5 05 1 85 - 7 62 Thousands/µL    Lymphocytes Absolute 1 92 0 60 - 4 47 Thousands/µL    Monocytes Absolute 0 78 0 17 - 1 22 Thousand/µL    Eosinophils Absolute 0 12 0 00 - 0 61 Thousand/µL    Basophils Absolute 0 02 0 00 - 0 10 Thousands/µL   Fingerstick Glucose (POCT)    Collection Time: 07/31/18  7:26 AM   Result Value Ref Range    POC Glucose 223 (H) 65 - 140 mg/dl     Imaging: I have personally reviewed pertinent imaging and PACS reports  VTE Prophylaxis: Sequential compression device (Venodyne)     Total time spent today 45 minutes  Greater than 50% of total time was spent with the patient and / or family counseling and / or coordination of care   A description of the counseling / coordination of care: discussing symptoms, plan re: MRI and overall plan of care

## 2018-07-31 NOTE — ASSESSMENT & PLAN NOTE
· For family on and off confusion for the last year  · Left basal ganglia infarct  · Awaiting EEG to rule out any seizure activity given waxing/waning status  · Delirium precautions/sleep hygiene/reorientation  · Likely will benefit from outpatient cognitive evaluation

## 2018-07-31 NOTE — ASSESSMENT & PLAN NOTE
· Normalized white BP is goal blood pressure 120 to 140  · Continue home Coreg may need to increase versus add Norvasc, would continue to hold on ACE-inhibitor given the creatinine is slightly rising from 01/08 to 2 2 have outpatient follow-up with Nephrology

## 2018-07-31 NOTE — PLAN OF CARE
Problem: DISCHARGE PLANNING - CARE MANAGEMENT  Goal: Discharge to post-acute care or home with appropriate resources  INTERVENTIONS:  - Conduct assessment to determine patient/family and health care team treatment goals, and need for post-acute services based on payer coverage, community resources, and patient preferences, and barriers to discharge  - Address psychosocial, clinical, and financial barriers to discharge as identified in assessment in conjunction with the patient/family and health care team  - Arrange appropriate level of post-acute services according to patients   needs and preference and payer coverage in collaboration with the physician and health care team  - Communicate with and update the patient/family, physician, and health care team regarding progress on the discharge plan  - Arrange appropriate transportation to post-acute venues  Outcome: Progressing  CM met with patient and wife Donovan Hwang at bedside  Patient lives at home in a 3 story home  There are 3 RAFAT with handrails  Patient lives on the first floor and has a handicap accessible shower  Patient's children also live with him they are ages 12,25,30, and 27  Patient has no HX of DME  Patient reported having VNA 3-4 years ago following a SX for his rotator cuff  Patient has no HX of STR  [facility] RX [facility] [address] [facility] [place] with no issues  Patient denies any mental health of substance abuse issues  Patient's wife would make decisions if necessary  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  Patient drives himself  CM reviewed PT/OT recommendations of STR  Patient and family are agreeable to referrals to 1) [facility] and 2) [facility]  CM reviewed the different levels of rehab with family and they understand      CM reviewed discharge planning process including the following: identifying caregivers at home, preference for d/c planning needs, [facility] Med[facility] to Bed program, availability of treatment team to discuss questions or concerns patient and/or family may have regarding diagnosis, plan of care, old or new medications and discharge planning  CM name and role reviewed  CM will continue to follow for care coordination and update assessment as necessary

## 2018-07-31 NOTE — ASSESSMENT & PLAN NOTE
· Acute left basal ganglia CVA  · CTA with severe focal stenosis of the distal right petrous/cavernous ICA junction, moderate focal right supraclinoid ICA stenosis, mild ostial stenosis of the bilateral vertebral arteries, moderate focal intracranial left vertebral stenosis and mild left atherosclerotic calcification of the right carotid bifurcation  · Neurology following  · Stroke pathway  · PT recommending inpatient rehab/awaiting OT consult  · Speech regular diet thin liquids  · Echo with an EF of 89% grade 1 diastolic dysfunction  · Y1T 7 5  · Fasting lipid profile 166/343/33/64  · MRI pending availability of spinal stimulator control are  Neurology is attempting a to reach out to rep again    · Normalized blood pressures, Coreg restarted yesterday, per Dr Lalita Cunningham outpatient note likely would benefit from ACE-inhibitor given stable creatinine will await outpatient follow-up given creatinine rising to 2 2  · Nutritional information given to family given patient's confusion

## 2018-07-31 NOTE — ASSESSMENT & PLAN NOTE
Acute left basal ganglia CVA  CTA with severe focal stenosis of the distal right petrous/cavernous ICA junction, moderate focal right supraclinoid ICA stenosis, mild ostial stenosis of the bilateral vertebral arteries, moderate focal intracranial left vertebral stenosis and mild left atherosclerotic calcification of the right carotid bifurcation  Neurology following  Stroke pathway  PT recommending inpatient rehab/awaiting OT consult  Speech regular diet thin liquids  Echo with an EF of 16% grade 1 diastolic dysfunction  A9W 7 5  MRI pending today

## 2018-07-31 NOTE — PLAN OF CARE
Problem: OCCUPATIONAL THERAPY ADULT  Goal: Performs self-care activities at highest level of function for planned discharge setting  See evaluation for individualized goals  Treatment Interventions: ADL retraining, Functional transfer training, Endurance training, Cognitive reorientation, Fine motor coordination activities  Equipment Recommended:  (RW)       See flowsheet documentation for full assessment, interventions and recommendations  Limitation: Decreased ADL status, Decreased Safe judgement during ADL, Decreased cognition, Decreased endurance, Decreased high-level ADLs, Decreased fine motor control     Assessment: Pt is a 62 yom admitted to [address] [place] [address] on 07/29/18 for a CVA, CT scan had an impression of a positive CVA w/ ischemic change to the L basal ganglia  HPI: Pt was experiencing R sided weakness and R sided facial droop (sudden onset)  Pt presents w/ the following PMH impacting occupational performance: DM, hypercholesteremia, HLD, HTN, neuropathy, obesity, PVC, osteomyelitis  Pt reports living 1 SH w/ 1 RAFAT and the entire home is handicap accessible, pt lives w/ wife and children  PTA pt used no AD for functional mobility  Pt reports I w I/ADLs at baseline  Upon evaluation, pt was A&O x 2, pt was able to engage in limited conversation due to language barrier, wife was present and able to respond to all question  Pt was unable to recall precautions and followed one step directions inconsistently  Pt required mod A x 1 for all completed transfers and needed max VC for safety and precautions  Pt navigated w/ in room from recliner chair to armrest chair w/ mod A x 1, RW, and VC for pacing  During LB dressing pt completed w/ mod A x 1  Pt required most A during LB to don socks, pt had 2 trials to don socks (seated crossed legs method)  Pt wife started sock and pt pulled over heel   Pt presents w/ decreased standing tolerance, standing balance (dynamic and static), dynamic seated balance, safe judgement, endurance, and [address]; impacting I w/ functional mobility/transfers, safe functional transfers, LB dressing, toileting, bathing, community mobility, and grooming  Pt completed Neuro QOL Depression and Anxiety Screen w/ a total score of 16 (8 for each screen)  While in acute care pt would benefit from OT services to address deficit areas  From an OT perspective, pt would benefit from post acute rehab when medically stable for discharge from acute care to return to [facility] and work   Will continue to follow     OT Discharge Recommendation:  (Post Acute Rehab)  OT - OK to Discharge:  (When medically stable)

## 2018-07-31 NOTE — PROGRESS NOTES
Progress Note - Polina Ojeda 1960, 62 y o  male MRN: [id number]    Unit/Bed#:  Encounter: [id number]    Primary Care Provider: Katheryn Mack DO   Date and time admitted to hospital: 7/29/2018 12:15 PM        * CVA (cerebral vascular accident) [facility])   Assessment & Plan    · Acute left basal ganglia CVA  · CTA with severe focal stenosis of the distal right petrous/cavernous ICA junction, moderate focal right supraclinoid ICA stenosis, mild ostial stenosis of the bilateral vertebral arteries, moderate focal intracranial left vertebral stenosis and mild left atherosclerotic calcification of the right carotid bifurcation  · Neurology following  · Stroke pathway  · PT recommending inpatient rehab/awaiting OT consult  · Speech regular diet thin liquids  · Echo with an EF of 95% grade 1 diastolic dysfunction  · U8U 7 5  · Fasting lipid profile 166/343/33/64  · MRI pending availability of spinal stimulator control are  Neurology is attempting a to reach out to rep again    · Normalized blood pressures, Coreg restarted yesterday, per Dr Yarelis Stein outpatient note likely would benefit from ACE-inhibitor given stable creatinine will await outpatient follow-up given creatinine rising to 2 2  · Nutritional information given to family given patient's confusion        Encephalopathy   Assessment & Plan    · For family on and off confusion for the last year  · Left basal ganglia infarct  · Awaiting EEG to rule out any seizure activity given waxing/waning status  · Delirium precautions/sleep hygiene/reorientation  · Likely will benefit from outpatient cognitive evaluation        Hemiplegia ([address] )   Assessment & Plan    · Improved right-sided weakness 4/5 strength  · PT recommending inpatient rehab awaiting OT consult        Dysarthria   Assessment & Plan    · Mild slurred speech  · Speech following        Essential hypertension   Assessment & Plan    · Normalized white BP is goal blood pressure 120 to 140  · Continue home Coreg however increase, would continue to hold on ACE-inhibitor given the creatinine is slightly rising from 01/08 to 2 2 have outpatient follow-up with Nephrology        Hyperlipidemia   Assessment & Plan    · Continue statin  · Dietary education        Type 2 diabetes mellitus with hyperglycemia [facility])   Assessment & Plan    Lab Results   Component Value Date    HGBA1C 7 5 (H) 07/28/2018       Recent Labs      07/30/18   1406  07/30/18   1720  07/30/18   2104  07/31/18   0726   POCGLU  174*  188*  170*  223*       Blood Sugar Average: Last 72 hrs:  (P) 129 [id number]     Add back home 70 30  Continue sliding scale insulin        CKD (chronic kidney disease) stage 3, GFR 30-59 ml/min   Assessment & Plan    · Baseline creatinine 1 8-2 2 currently at baseline  · Outpatient follow-up with Dr Chandni Angulo EEG, follow up with MRI abilities with spinal stimulator, will discuss with case management rehab options as patient will need inpatient rehab with likely discharge in the next 24-48 hours    Subjective/Objective     Subjective:   Patient sitting in chair  Slept well overnight  Denies complaints of chest pain/shortness of breath/for vision/double vision  States his appetite has been fair  Wife at bedside  Objective:  Vitals: Blood pressure 150/81, pulse 74, temperature 98 2 °F (36 8 °C), temperature source Oral, resp  rate 20, height 5' 8" (1 727 m), weight 112 kg (247 lb 12 8 oz), SpO2 94 %  ,Body mass index is 37 68 kg/m²  Intake/Output Summary (Last 24 hours) at 07/31/18 1037  Last data filed at 07/30/18 1402   Gross per 24 hour   Intake                0 ml   Output              500 ml   Net             -500 ml       Invasive Devices     Peripheral Intravenous Line            Peripheral IV 07/29/18 Left Arm 1 day    Peripheral IV 07/29/18 Left Forearm 1 day                Physical Exam:   Physical Exam   Constitutional: No distress  HENT:   Head: Normocephalic and atraumatic  Mouth/Throat: Oropharynx is clear and moist    Eyes: Pupils are equal, round, and reactive to light  No scleral icterus  Neck: Neck supple  No JVD present  Cardiovascular: Normal rate, regular rhythm and intact distal pulses  Exam reveals no gallop and no friction rub  Murmur heard  Pulmonary/Chest: Effort normal and breath sounds normal  No respiratory distress  He has no wheezes  He has no rales  Abdominal: Soft  Bowel sounds are normal  He exhibits no distension  There is no tenderness  Musculoskeletal: He exhibits no edema  Right side 4/5 strength, right upper extremity dysmetria   Neurological: He is alert  A cranial nerve deficit is present  Patient aware is in the hospital but unaware of why came to the hospital stated for abdominal pain, states the year is 2008, where self and wife  Right facial droop, right-sided weakness as noted above   Skin: Skin is warm and dry  No rash noted  No erythema  No pallor  Lab, Imaging and other studies: I have personally reviewed pertinent reports     and I have personally reviewed pertinent films in PACS  VTE Pharmacologic Prophylaxis: Sequential compression device (Venodyne)  and Heparin  VTE Mechanical Prophylaxis: sequential compression device

## 2018-08-01 ENCOUNTER — APPOINTMENT (INPATIENT)
Dept: MRI IMAGING | Facility: HOSPITAL | Age: 58
DRG: 061 | End: 2018-08-01
Payer: COMMERCIAL

## 2018-08-01 LAB
ANION GAP SERPL CALCULATED.3IONS-SCNC: 5 MMOL/L (ref 4–13)
BASOPHILS # BLD AUTO: 0.02 THOUSANDS/ΜL (ref 0–0.1)
BASOPHILS NFR BLD AUTO: 0 % (ref 0–1)
BUN SERPL-MCNC: 25 MG/DL (ref 5–25)
CALCIUM SERPL-MCNC: 9 MG/DL (ref 8.3–10.1)
CHLORIDE SERPL-SCNC: 105 MMOL/L (ref 100–108)
CO2 SERPL-SCNC: 27 MMOL/L (ref 21–32)
CREAT SERPL-MCNC: 2.25 MG/DL (ref 0.6–1.3)
EOSINOPHIL # BLD AUTO: 0.14 THOUSAND/ΜL (ref 0–0.61)
EOSINOPHIL NFR BLD AUTO: 2 % (ref 0–6)
ERYTHROCYTE [DISTWIDTH] IN BLOOD BY AUTOMATED COUNT: 13.4 % (ref 11.6–15.1)
GFR SERPL CREATININE-BSD FRML MDRD: 31 ML/MIN/1.73SQ M
GLUCOSE SERPL-MCNC: 154 MG/DL (ref 65–140)
GLUCOSE SERPL-MCNC: 160 MG/DL (ref 65–140)
GLUCOSE SERPL-MCNC: 196 MG/DL (ref 65–140)
GLUCOSE SERPL-MCNC: 254 MG/DL (ref 65–140)
GLUCOSE SERPL-MCNC: 293 MG/DL (ref 65–140)
HCT VFR BLD AUTO: 37.7 % (ref 36.5–49.3)
HGB BLD-MCNC: 12.4 G/DL (ref 12–17)
IMM GRANULOCYTES # BLD AUTO: 0.07 THOUSAND/UL (ref 0–0.2)
IMM GRANULOCYTES NFR BLD AUTO: 1 % (ref 0–2)
LYMPHOCYTES # BLD AUTO: 1.76 THOUSANDS/ΜL (ref 0.6–4.47)
LYMPHOCYTES NFR BLD AUTO: 23 % (ref 14–44)
MCH RBC QN AUTO: 28.8 PG (ref 26.8–34.3)
MCHC RBC AUTO-ENTMCNC: 32.9 G/DL (ref 31.4–37.4)
MCV RBC AUTO: 88 FL (ref 82–98)
MONOCYTES # BLD AUTO: 0.66 THOUSAND/ΜL (ref 0.17–1.22)
MONOCYTES NFR BLD AUTO: 9 % (ref 4–12)
NEUTROPHILS # BLD AUTO: 5.1 THOUSANDS/ΜL (ref 1.85–7.62)
NEUTS SEG NFR BLD AUTO: 65 % (ref 43–75)
NRBC BLD AUTO-RTO: 0 /100 WBCS
PHOSPHATE SERPL-MCNC: 4.1 MG/DL (ref 2.7–4.5)
PLATELET # BLD AUTO: 146 THOUSANDS/UL (ref 149–390)
PMV BLD AUTO: 10.3 FL (ref 8.9–12.7)
POTASSIUM SERPL-SCNC: 4.1 MMOL/L (ref 3.5–5.3)
RBC # BLD AUTO: 4.31 MILLION/UL (ref 3.88–5.62)
SODIUM SERPL-SCNC: 137 MMOL/L (ref 136–145)
WBC # BLD AUTO: 7.75 THOUSAND/UL (ref 4.31–10.16)

## 2018-08-01 PROCEDURE — 82948 REAGENT STRIP/BLOOD GLUCOSE: CPT

## 2018-08-01 PROCEDURE — 84100 ASSAY OF PHOSPHORUS: CPT | Performed by: PHYSICIAN ASSISTANT

## 2018-08-01 PROCEDURE — 99232 SBSQ HOSP IP/OBS MODERATE 35: CPT | Performed by: PSYCHIATRY & NEUROLOGY

## 2018-08-01 PROCEDURE — 97110 THERAPEUTIC EXERCISES: CPT

## 2018-08-01 PROCEDURE — 99232 SBSQ HOSP IP/OBS MODERATE 35: CPT | Performed by: INTERNAL MEDICINE

## 2018-08-01 PROCEDURE — 97535 SELF CARE MNGMENT TRAINING: CPT

## 2018-08-01 PROCEDURE — 70551 MRI BRAIN STEM W/O DYE: CPT

## 2018-08-01 PROCEDURE — 85025 COMPLETE CBC W/AUTO DIFF WBC: CPT | Performed by: PHYSICIAN ASSISTANT

## 2018-08-01 PROCEDURE — 97116 GAIT TRAINING THERAPY: CPT

## 2018-08-01 PROCEDURE — 80048 BASIC METABOLIC PNL TOTAL CA: CPT | Performed by: PHYSICIAN ASSISTANT

## 2018-08-01 RX ORDER — INSULIN ASPART 100 [IU]/ML
35 INJECTION, SUSPENSION SUBCUTANEOUS
Status: DISCONTINUED | OUTPATIENT
Start: 2018-08-02 | End: 2018-08-02 | Stop reason: HOSPADM

## 2018-08-01 RX ORDER — CARVEDILOL 6.25 MG/1
6.25 TABLET ORAL 2 TIMES DAILY WITH MEALS
Status: DISCONTINUED | OUTPATIENT
Start: 2018-08-01 | End: 2018-08-02 | Stop reason: HOSPADM

## 2018-08-01 RX ORDER — ASPIRIN 81 MG/1
81 TABLET ORAL DAILY
Status: DISCONTINUED | OUTPATIENT
Start: 2018-08-02 | End: 2018-08-02 | Stop reason: HOSPADM

## 2018-08-01 RX ORDER — ATORVASTATIN CALCIUM 40 MG/1
80 TABLET, FILM COATED ORAL
Status: DISCONTINUED | OUTPATIENT
Start: 2018-08-02 | End: 2018-08-02 | Stop reason: HOSPADM

## 2018-08-01 RX ADMIN — INSULIN LISPRO 2 UNITS: 100 INJECTION, SOLUTION INTRAVENOUS; SUBCUTANEOUS at 21:41

## 2018-08-01 RX ADMIN — CARVEDILOL 6.25 MG: 6.25 TABLET, FILM COATED ORAL at 08:42

## 2018-08-01 RX ADMIN — CARVEDILOL 6.25 MG: 6.25 TABLET, FILM COATED ORAL at 21:42

## 2018-08-01 RX ADMIN — LABETALOL 20 MG/4 ML (5 MG/ML) INTRAVENOUS SYRINGE 10 MG: at 03:39

## 2018-08-01 RX ADMIN — INSULIN ASPART 35 UNITS: 100 INJECTION, SUSPENSION SUBCUTANEOUS at 07:57

## 2018-08-01 RX ADMIN — ATORVASTATIN CALCIUM 40 MG: 40 TABLET, FILM COATED ORAL at 17:42

## 2018-08-01 RX ADMIN — INSULIN LISPRO 2 UNITS: 100 INJECTION, SOLUTION INTRAVENOUS; SUBCUTANEOUS at 07:58

## 2018-08-01 RX ADMIN — FAMOTIDINE 20 MG: 20 TABLET ORAL at 08:42

## 2018-08-01 RX ADMIN — ASPIRIN 81 MG 81 MG: 81 TABLET ORAL at 08:42

## 2018-08-01 RX ADMIN — INSULIN LISPRO 6 UNITS: 100 INJECTION, SOLUTION INTRAVENOUS; SUBCUTANEOUS at 14:00

## 2018-08-01 NOTE — ASSESSMENT & PLAN NOTE
Lab Results   Component Value Date    HGBA1C 7 5 (H) 07/28/2018       Recent Labs      07/31/18   1133  07/31/18   1647  07/31/18   2112  08/01/18   0718   POCGLU  237*  226*  122  160*       Blood Sugar Average: Last 72 hrs:  (P) 185  6     Add back home 70 30  Continue sliding scale insulin

## 2018-08-01 NOTE — PHYSICAL THERAPY NOTE
PT PROGRESS NOTE    Name: Mara Del Rosario  AGE: 62 y o  MRN: [id number]             08/01/18 1233   Pain Assessment   Pain Assessment No/denies pain   Restrictions/Precautions   Weight Bearing Precautions Per Order No   Other Precautions Impulsive;Cognitive;Telemetry; Fall Risk   General   Chart Reviewed Yes   Response to Previous Treatment Patient with no complaints from previous session  Family/Caregiver Present Yes   Cognition   Overall Cognitive Status Impaired   Arousal/Participation Alert; Cooperative   Attention Attends with cues to redirect   Orientation Level Oriented to person;Disoriented to time;Disoriented to place; Disoriented to situation  (pt knows that he is in the hospital but not name)   Following Commands Follows one step commands with increased time or repetition   Comments pt require cues to remember name of hospital & date   Subjective   Subjective Pt offered no complaints  Agreeable to therapy  Bed Mobility   Supine to Sit Unable to assess   Additional Comments pt OOB in chair pre & post session   Transfers   Sit to Stand 4  Minimal assistance   Additional items Assist x 1; Armrests; Verbal cues; Impulsive   Stand to Sit 4  Minimal assistance   Additional items Assist x 1; Armrests; Impulsive;Verbal cues   Stand pivot 4  Minimal assistance   Additional items Assist x 1; Armrests; Impulsive;Verbal cues   Additional Comments pt impulsive, require cues for techniques & pacing   Ambulation/Elevation   Gait pattern Antalgic;Decreased foot clearance;Decreased R stance;R Knee Maykel; Inconsistent lubna  (impulsive; R knee buckling at times)   Gait Assistance 4  Minimal assist   Additional items Assist x 1;Verbal cues; Tactile cues   Assistive Device Rolling walker   Distance 240'x1   Stair Management Assistance Not tested   Balance   Static Sitting Good   Static Standing Fair -   Ambulatory Poor +   Activity Tolerance   Activity Tolerance Patient tolerated treatment well   Nurse Made Aware yes Exercises   TKR Sitting;Standing;10 reps;AROM; Bilateral   Assessment   Prognosis Good   Problem List Decreased strength;Decreased endurance; Impaired balance;Decreased mobility; Decreased coordination;Decreased cognition; Impaired judgement;Decreased safety awareness; Obesity   Assessment Pt seen for PT per POC  Improved mobility & activity tolerance noted this tx session  See above levels of assistance required for all functional tasks  Gait deviations as above but no gross LOB noted  Pt impulsive & require cues for proper pacing during thera ex & overall activity as well as cues for mobility safety techniques  (+) R knee buckling at times but pt able to catch self + minAx1 for safety  Pt tolerated above mentioned thera  ex well  No dizziness reported t/o session  Most recent vital signs as follows: /81 (BP Location: Left arm)   Pulse 78   Temp 98 °F (36 7 °C) (Oral)   Resp 20   Ht 5' 8" (1 727 m)   Wt 112 kg (247 lb 12 8 oz)   SpO2 95%   BMI 37 68 kg/m²   Limited PT session as pt leaving unit for MRI  Will continue PT per POC  At end of session, pt left room via transport chair in stable condition, transported to MRI    Will continue to recommend inpt rehab at D/C  CM to follow  Nsg staff to continue to mobilized pt (OOB in chair for all meals & ambulate in room/unit) as tolerated to prevent decline in function  Nsg notified  Barriers to Discharge Inaccessible home environment;Decreased caregiver support   Barriers to Discharge Comments (+) RAFAT; wife goes back to work in august (when school starts)   Goals   Patient Goals to go back home   STG Expiration Date 08/09/18   Treatment Day 2   Plan   Treatment/Interventions Functional transfer training;LE strengthening/ROM; Therapeutic exercise; Endurance training;Patient/family training;Bed mobility;Gait training;Spoke to nursing;Family;Spoke to case management   Progress Progressing toward goals   PT Frequency Other (Comment)  (3-5x/wk + one time weekend) Recommendation   Recommendation Post acute IP rehab   Equipment Recommended Walker  (RW)   PT - OK to Discharge Yes  (to inpt rehab)   Desire Henson, PT

## 2018-08-01 NOTE — PLAN OF CARE
Problem: PHYSICAL THERAPY ADULT  Goal: Performs mobility at highest level of function for planned discharge setting  See evaluation for individualized goals  Treatment/Interventions: Functional transfer training, LE strengthening/ROM, Therapeutic exercise, Endurance training, Cognitive reorientation, Patient/family training, Equipment eval/education, Bed mobility, Gait training (PT to see when stair training is appropriate)          See flowsheet documentation for full assessment, interventions and recommendations  Outcome: Progressing  Prognosis: Good  Problem List: Decreased strength, Decreased endurance, Impaired balance, Decreased mobility, Decreased coordination, Decreased cognition, Impaired judgement, Decreased safety awareness, Obesity  Assessment: Pt seen for PT per POC  Improved mobility & activity tolerance noted this tx session  See above levels of assistance required for all functional tasks  Gait deviations as above but no gross LOB noted  Pt impulsive & require cues for proper pacing during thera ex & overall activity as well as cues for mobility safety techniques  (+) R knee buckling at times but pt able to catch self + minAx1 for safety  Pt tolerated above mentioned thera  ex well  No dizziness reported t/o session  Most recent vital signs as follows: /81 (BP Location: Left arm)   Pulse 78   Temp 98 °F (36 7 °C) (Oral)   Resp 20   Ht 5' 8" (1 727 m)   Wt 112 kg (247 lb 12 8 oz)   SpO2 95%   BMI 37 68 kg/m²   Limited PT session as pt leaving unit for MRI  Will continue PT per POC  At end of session, pt left room via transport chair in stable condition, transported to MRI    Will continue to recommend inpt rehab at D/C  CM to follow  Nsg staff to continue to mobilized pt (OOB in chair for all meals & ambulate in room/unit) as tolerated to prevent decline in function  Nsg notified     Barriers to Discharge: Inaccessible home environment, Decreased caregiver support  Barriers to Discharge Comments: (+) RAFAT; wife goes back to work in august (when school starts)  Recommendation: Post acute IP rehab     PT - OK to Discharge: Yes (to inpt rehab)    See flowsheet documentation for full assessment

## 2018-08-01 NOTE — ASSESSMENT & PLAN NOTE
For family on and off confusion for the last year  Left basal ganglia infarct  EEG shows ? Focal slowing  Improving with regard to cognition

## 2018-08-01 NOTE — ASSESSMENT & PLAN NOTE
Improved right-sided weakness 4/5 strength  PT recommending inpatient rehab awaiting OT consult  Still with right pronator drift

## 2018-08-01 NOTE — PLAN OF CARE
Problem: Potential for Falls  Goal: Patient will remain free of falls  INTERVENTIONS:  - Assess patient frequently for physical needs  -  Identify cognitive and physical deficits and behaviors that affect risk of falls  -  [place] fall precautions as indicated by assessment   - Educate patient/family on patient safety including physical limitations  - Instruct patient to call for assistance with activity based on assessment  - Modify environment to reduce risk of injury  - Consider OT/PT consult to assist with strengthening/mobility   Outcome: Progressing      Problem: Prexisting or High Potential for Compromised Skin Integrity  Goal: Skin integrity is maintained or improved  INTERVENTIONS:  - Identify patients at risk for skin breakdown  - Assess and monitor skin integrity  - Assess and monitor nutrition and hydration status  - Monitor labs (i e  albumin)  - Assess for incontinence   - Turn and reposition patient  - Assist with mobility/ambulation  - Relieve pressure over bony prominences  - Avoid friction and shearing  - Provide appropriate hygiene as needed including keeping skin clean and dry  - Evaluate need for skin moisturizer/barrier cream  - Collaborate with interdisciplinary team (i e  Nutrition, Rehabilitation, etc )   - Patient/family teaching   Outcome: Progressing      Problem: Neurological Deficit  Goal: Neurological status is stable or improving  Interventions:  - Monitor and assess patient's level of consciousness, motor function, sensory function, and level of assistance needed for ADLs  - Monitor and report changes from baseline  Collaborate with interdisciplinary team to initiate plan and implement interventions as ordered  - Provide and maintain a safe environment  - Utilize seizure precautions  - Utilize fall precautions  - Utilize aspiration precautions  - Utilize bleeding precautions  Outcome: Progressing      Problem:  Activity Intolerance/Impaired Mobility  Goal: Mobility/activity is maintained at optimum level for patient  Interventions:  - Assess and monitor patient  barriers to mobility and need for assistive/adaptive devices  - Assess patient's emotional response to limitations  - Collaborate with interdisciplinary team and initiate plans and interventions as ordered  - Encourage independent activity per ability   - Maintain proper body alignment  - Perform active/passive rom as tolerated/ordered  - Plan activities to conserve energy   - Turn patient   Outcome: Progressing      Problem: Communication Impairment  Goal: Ability to express needs and understand communication  Assess patient's communication skills and ability to understand information  Patient will demonstrate use of effective communication techniques, alternative methods of communication and understanding even if not able to speak  - Encourage communication and provide alternate methods of communication as needed  - Collaborate with case management/ for discharge needs  - Include patient/family/caregiver in decisions related to communication  Outcome: Progressing      Problem: Potential for Aspiration  Goal: Non-ventilated patient's risk of aspiration is minimized  Assess and monitor vital signs, respiratory status, and labs (WBC)  Monitor for signs of aspiration (tachypnea, cough, rales, wheezing, cyanosis, fever)  - Assess and monitor patient's ability to swallow  - Place patient up in chair to eat if possible  - HOB up at 90 degrees to eat if unable to get patient up into chair   - Supervise patient during oral intake  - Instruct patient to take small bites  - Instruct patient to take small single sips when taking liquids  - Follow patient-specific strategies generated by speech pathologist    Outcome: Progressing    Goal: Ventilated patient's risk of aspiration is minimized  Assess and monitor vital signs, respiratory status, airway cuff pressure, and labs (WBC)  Monitor for signs of aspiration (tachypnea, cough, rales, wheezing, cyanosis, fever)  - Elevate head of bed 30 degrees if patient has tube feeding   - Monitor tube feeding  Outcome: Progressing      Problem: Nutrition  Goal: Nutrition/Hydration status is improving  Monitor and assess patient's nutrition/hydration status for malnutrition (ex- brittle hair, bruises, dry skin, pale skin and conjunctiva, muscle wasting, smooth red tongue, and disorientation)  Collaborate with interdisciplinary team and initiate plan and interventions as ordered  Monitor patient's weight and dietary intake as ordered or per policy  Utilize nutrition screening tool and intervene per policy  Determine patient's food preferences and provide high-protein, high-caloric foods as appropriate  - Assist patient with eating   - Allow adequate time for meals   - Encourage patient to take dietary supplement as ordered  - Collaborate with clinical nutritionist   - Include patient/family/caregiver in decisions related to nutrition  Outcome: Progressing      Problem: Nutrition/Hydration-ADULT  Goal: Nutrient/Hydration intake appropriate for improving, restoring or maintaining nutritional needs  Monitor and assess patient's nutrition/hydration status for malnutrition (ex- brittle hair, bruises, dry skin, pale skin and conjunctiva, muscle wasting, smooth red tongue, and disorientation)  Collaborate with interdisciplinary team and initiate plan and interventions as ordered  Monitor patient's weight and dietary intake as ordered or per policy  Utilize nutrition screening tool and intervene per policy  Determine patient's food preferences and provide high-protein, high-caloric foods as appropriate       INTERVENTIONS:  - Monitor oral intake, urinary output, labs, and treatment plans  - Assess nutrition and hydration status and recommend course of action  - Evaluate amount of meals eaten  - Assist patient with eating if necessary   - Allow adequate time for meals  - Recommend/ encourage appropriate diets, oral nutritional supplements, and vitamin/mineral supplements  - Order, calculate, and assess calorie counts as needed  - Recommend, monitor, and adjust tube feedings and TPN/PPN based on assessed needs  - Assess need for intravenous fluids  - Provide specific nutrition/hydration education as appropriate  - Include patient/family/caregiver in decisions related to nutrition   Outcome: Progressing      Problem: DISCHARGE PLANNING - CARE MANAGEMENT  Goal: Discharge to post-acute care or home with appropriate resources  INTERVENTIONS:  - Conduct assessment to determine patient/family and health care team treatment goals, and need for post-acute services based on payer coverage, community resources, and patient preferences, and barriers to discharge  - Address psychosocial, clinical, and financial barriers to discharge as identified in assessment in conjunction with the patient/family and health care team  - Arrange appropriate level of post-acute services according to patient's   needs and preference and payer coverage in collaboration with the physician and health care team  - Communicate with and update the patient/family, physician, and health care team regarding progress on the discharge plan  - Arrange appropriate transportation to post-acute venues   Outcome: Progressing

## 2018-08-01 NOTE — OCCUPATIONAL THERAPY NOTE
OccupationalTherapy Progress Note     Patient Name: Priscilla Potts  NXDHS'T Date: 8/1/2018  Problem List  Patient Active Problem List   Diagnosis    Type 2 diabetes mellitus with hyperglycemia ([facility] )    Hyperlipidemia    Essential hypertension    Spinal cord stimulator status    Type 2 diabetes mellitus with diabetic neuropathy, with long-term current use of insulin ([facility] )    Diabetic nephropathy associated with type 2 diabetes mellitus (HCC)    CKD (chronic kidney disease) stage 3, GFR 30-59 ml/min    Urine test positive for microalbuminuria    Microscopic hematuria    Ulnar neuropathy at elbow of left upper extremity    CVA (cerebral vascular accident) ([facility] )    Hemiplegia ([facility] )    Dysarthria    Encephalopathy          08/01/18 0951   Restrictions/Precautions   Weight Bearing Precautions Per Order No   Other Precautions Cognitive; Impulsive; Chair Alarm; Bed Alarm;Multiple lines;Telemetry; Fall Risk   Pain Assessment   Pain Assessment No/denies pain   Pain Score No Pain   ADL   Where Assessed Chair   Eating Assistance 6  Modified independent   Eating Deficit Supervision/safety  (Pt had eaten breakfast upon evaluation)   LB Dressing Assistance 5  Supervision/Setup   LB Dressing Deficit (Pt don/doff socks seated in chair (crossed legs))   LB Dressing Comments Progression from eval, pt was able to cross leg over knee and maintain midline seated position to don/doff socks  Functional Standing Tolerance   Time 1 min   Comments Navigated in room from EOB to recliner chair   Bed Mobility   Supine to Sit 5  Supervision   Additional items HOB elevated; Increased time required; Bedrails   Additional Comments Pt presented in bed w/ HOB elevated upon evaluation   Transfers   Sit to Stand 4  Minimal assistance   Additional items Assist x 1; Armrests; Increased time required; Impulsive;Verbal cues   Stand to Sit 4  Minimal assistance   Additional items Assist x 1; Increased time required; Impulsive;Verbal cues;Armrests Additional Comments Pt requires less physical A however; Pt requires extensive VC for hand placement during transfers  Functional Mobility   Functional Mobility 4  Minimal assistance   Additional Comments VC needed for appropriate pace and RW management   Additional items Rolling walker   Coordination   Fine Motor Able to manipulate pencil during MOCA but unable to maintain tripod pinch  Grasp was initially weak but progressed  Cognition   Overall Cognitive Status Impaired   Arousal/Participation Alert; Responsive;Lethargic   Attention Attends with cues to redirect   Orientation Level Oriented X4  (Date required + time and was inconsistent through tx session)   Memory Decreased long term memory;Decreased short term memory;Decreased recall of recent events;Decreased recall of precautions   Following Commands Follows one step commands inconsistently   Comments Pt identified by full name and   Pt presented w/ high levels on confusion and decreased memory  Pt was slow to report his  and unable to report wife's  and age w/o significant prompts and cues  Pt also reported to not know anything about his kids  As per his wife who was present, ? honest attempt to recall information from memory, wife commented "you know these things" and pt would grin  Cognition Assessment Tools MOCA   Score 17   Additional Activities   Additional Activities Comments Post treatment pt was in recliner chair w/ wife and MD present   Activity Tolerance   Activity Tolerance Patient limited by fatigue   Medical Staff Made Aware OT [place]   Assessment   Assessment Patient seen for OT tx session day 1  Pt was agreeable to working with OT upon session; however, pt was refusing to participate in bathroom ADLs  Pt reported to have not slept well and was lethargic  During the tx session, pt completed functional transfers, room negotiation w/ RW, LB dressing (don socks), and MOCA   During functional transfers pt required less physical A (min A x 1), but needed max VC for safety, hand placement, and controlled descend during stand > sit transfer  LB dressing (don/doff socks) was completed w/ S seated at chair and crossing legs, functional mobility completed w/ min A x 1 w/ VC for safety, RW management, and pace  Tx session was limited to fatigue, pt "did not feel like" doing ADLs  Pt still impulsive and demonstrates poor insight to deficits and safety precautions  Recommendation stands for post acute rehab, pt still in need of RW for functional mobility  Will continue to follow  Plan   Treatment Interventions Functional transfer training;ADL retraining;Continued evaluation; Activityengagement;Equipment evaluation/education   Goal Expiration Date 18   Treatment Day 1   OT Frequency 3-5x/wk   Recommendation   OT Discharge Recommendation (Post Acute Rehab)   Equipment Recommended Other (comment)  (RW)   OT - OK to Discharge (When medically stable)   Barthel Index   Feeding 10   Bathing 0   Grooming Score 5   Dressing Score 10   Bladder Score 10   Bowels Score 10   Toilet Use Score 5   Transfers (Bed/Chair) Score 10   Mobility (Level Surface) Score 0   Stairs Score 0   Barthel Index Score 60   Modified Haralson Scale   Modified Evangelina Scale 4     Pt completed [place] Cognitive Assessment (MOCA) on 2018 and scored 17/30 indicating MODERATE cognitive deficits, the following outlines scoring per section:     Visuospatial/Executive: 3/5; unable to complete pattern in item 1 or copy cube    Namin/3; unable to report rhino, referred to camel as kangaroo  Memory: Repeated words correctly  Attention: 5/6; unable to sequentially subtract  Language: 2/3; 3 words w/ 'F'   Abstraction: 0/2;   Delayed Recall: 0/5; unable to recall after hints  Orientation: 6/6

## 2018-08-01 NOTE — PROGRESS NOTES
Progress Note - Amos Solum 1960, 62 y o  male MRN: [id number]    Unit/Bed#: -01 Encounter: [id number]    Primary Care Provider: Kodi Mcduffie DO   Date and time admitted to hospital: 7/29/2018 12:15 PM        Encephalopathy   Assessment & Plan    For family on and off confusion for the last year  Left basal ganglia infarct  EEG shows ? Focal slowing  Improving with regard to cognition  Hemiplegia [facility])   Assessment & Plan    Improved right-sided weakness 4/5 strength  PT recommending inpatient rehab awaiting OT consult  Still with right pronator drift  CKD (chronic kidney disease) stage 3, GFR 30-59 ml/min   Assessment & Plan    Baseline creatinine 1 8-2 2 currently at baseline  Outpatient follow-up with Dr Wendi Perez    Creatinine today is 2 25  Essential hypertension   Assessment & Plan    Normalized white BP is goal blood pressure 120 to 140  Continue home Coreg however increase,  BP today is 141/81        Hyperlipidemia   Assessment & Plan    Continue statin  Dietary education        Type 2 diabetes mellitus with hyperglycemia [facility])   Assessment & Plan    Lab Results   Component Value Date    HGBA1C 7 5 (H) 07/28/2018       Recent Labs      07/31/18   1133  07/31/18   1647  07/31/18   2112  08/01/18   0718   POCGLU  237*  226*  122  160*       Blood Sugar Average: Last 72 hrs:  (P) 185  6     Add back home 70 30  Continue sliding scale insulin        * CVA (cerebral vascular accident) [facility])   Assessment & Plan    Acute left basal ganglia CVA  CTA with severe focal stenosis of the distal right petrous/cavernous ICA junction, moderate focal right supraclinoid ICA stenosis, mild ostial stenosis of the bilateral vertebral arteries, moderate focal intracranial left vertebral stenosis and mild left atherosclerotic calcification of the right carotid bifurcation  Neurology following  Stroke pathway  PT recommending inpatient rehab/awaiting OT consult  Speech regular diet thin liquids  Echo with an EF of 79% grade 1 diastolic dysfunction  G9E 7 5  MRI pending today  VTE Pharmacologic Prophylaxis:   Pharmacologic: Heparin  Mechanical VTE Prophylaxis in Place: Yes    Patient Centered Rounds: I have performed bedside rounds with nursing staff today  Discussions with Specialists or Other Care Team Provider: discussed with neurology  - awaiting MRI to determine etiology of stroke and treatment plan  Discussed with CM, awaiting auth from insurance  Medically stable for DC likely this PM/tomorrow AM      Education and Discussions with Family / Patient: Discussed at length with patient and wife  Time Spent for Care: 30 minutes  More than 50% of total time spent on counseling and coordination of care as described above  Current Length of Stay: 3 day(s)    Current Patient Status: Inpatient   Certification Statement: The patient will continue to require additional inpatient hospital stay due to awaiting MRI  Discharge Plan: Not medically stable for DC  Likely later today or at the latest tomorrow am      Code Status: Level 1 - Full Code      Subjective:   Patient seen and examined  No new symptoms  No fevers or chills  Feeling "good"  Answering more questions appropriately, knows that the month is "the 8th month"  Objective:     Vitals:   Temp (24hrs), Av 1 °F (36 7 °C), Min:97 9 °F (36 6 °C), Max:98 2 °F (36 8 °C)    HR:  [68-78] 78  Resp:  [17-26] 20  BP: (141-186)/(68-88) 141/81  SpO2:  [95 %-99 %] 95 %  Body mass index is 37 68 kg/m²  Input and Output Summary (last 24 hours): Intake/Output Summary (Last 24 hours) at 18 1146  Last data filed at 18 0716   Gross per 24 hour   Intake             1260 ml   Output             1750 ml   Net             -490 ml       Physical Exam:     Physical Exam   Constitutional: He appears well-developed  No distress  HENT:   Head: Normocephalic and atraumatic     Mouth/Throat: No oropharyngeal exudate  Facial droop  Eyes: Right eye exhibits no discharge  Left eye exhibits no discharge  No scleral icterus  Neck: No JVD present  No tracheal deviation present  No thyromegaly present  Cardiovascular: Normal rate  Exam reveals no gallop and no friction rub  No murmur heard  Pulmonary/Chest: Effort normal  No respiratory distress  He has no wheezes  He has no rales  He exhibits no tenderness  Abdominal: Soft  He exhibits no distension and no mass  There is no tenderness  There is no rebound and no guarding  Musculoskeletal: He exhibits no edema or deformity  Lymphadenopathy:     He has no cervical adenopathy  Neurological: He is alert  No cranial nerve deficit  Coordination normal    Right sided pronator drift  Power 4/5 on right side  Skin: Skin is warm  No rash noted  He is not diaphoretic  No erythema  No pallor  Psychiatric: He has a normal mood and affect  Additional Data:     Labs:      Results from last 7 days  Lab Units 08/01/18  0515   WBC Thousand/uL 7 75   HEMOGLOBIN g/dL 12 4   HEMATOCRIT % 37 7   PLATELETS Thousands/uL 146*   NEUTROS PCT % 65   LYMPHS PCT % 23   MONOS PCT % 9   EOS PCT % 2       Results from last 7 days  Lab Units 08/01/18  0515  07/28/18  0712   SODIUM mmol/L 137  < > 137   POTASSIUM mmol/L 4 1  < > 4 5   CHLORIDE mmol/L 105  < > 105   CO2 mmol/L 27  < > 26   BUN mg/dL 25  < > 26*   CREATININE mg/dL 2 25*  < > 2 37*   CALCIUM mg/dL 9 0  < > 8 8   TOTAL PROTEIN g/dL  --   --  6 8   BILIRUBIN TOTAL mg/dL  --   --  0 40   ALK PHOS U/L  --   --  133*   ALT U/L  --   --  29   AST U/L  --   --  18   GLUCOSE RANDOM mg/dL 154*  < >  --    GLUCOSE, ISTAT   --   < >  --    < > = values in this interval not displayed      Results from last 7 days  Lab Units 07/29/18  1215   INR  0 93       Results from last 7 days  Lab Units 08/01/18  0718 07/31/18  2112 07/31/18  1647 07/31/18  1133 07/31/18  0726 07/30/18  2104 07/30/18  1720 07/30/18  1406 07/29/18  1555 07/29/18  1208   POC GLUCOSE mg/dl 160* 122 226* 237* 223* 170* 188* 174* 138 218*       Results from last 7 days  Lab Units 07/28/18  0712   HEMOGLOBIN A1C % 7 5*         * I Have Reviewed All Lab Data Listed Above  * Additional Pertinent Lab Tests Reviewed: All [place] Admission Reviewed    Imaging:    Imaging Reports Reviewed Today Include:   CT head -   "Evolving ischemic change in the left basal ganglia "  Imaging Personally Reviewed by Myself Includes:    As above  Recent Cultures (last 7 days):           Last 24 Hours Medication List:     Current Facility-Administered Medications:  acetaminophen 650 mg Rectal Q4H PRN Carolyn PembrokeBRITTANY   [facility]] aspirin 81 mg Oral Daily BRITTANY Mcdaniel   atorvastatin 40 mg Oral QPM Guille Penn PA-C   [facility] Hold] carvedilol 6 25 mg Oral Q12H BRITTANY Mcdaniel   famotidine 20 mg Oral Daily BRITTANY Mcdaniel   [facility]] insulin aspart protamine-insulin aspart 35 Units Subcutaneous BID AC BRITTANY Mcdaniel   insulin lispro 1-6 Units Subcutaneous HS BRITTANY Mcdaniel   insulin lispro 2-12 Units Subcutaneous TID AC BRITTANY Mcdaniel   labetalol 10 mg Intravenous Q4H PRN Guille Penn PA-C        Today, Patient Was Seen By: Lavon Sims MD    ** Please Note: Dictation voice to text software may have been used in the creation of this document   **

## 2018-08-01 NOTE — ASSESSMENT & PLAN NOTE
Baseline creatinine 1 8-2 2 currently at baseline  Outpatient follow-up with Dr Edwin Walker    Creatinine today is 2 25

## 2018-08-01 NOTE — ASSESSMENT & PLAN NOTE
Normalized white BP is goal blood pressure 120 to 140  Continue home Coreg however increase,  BP today is 141/81

## 2018-08-01 NOTE — SOCIAL WORK
MIKE contacted [employer] to schedule transportation for 8/2/18 via  [facility]  to [facility] telephoned CM and stated they have authorization from insurance company  MIKE awaiting call back from [employer] for transport time   CM will continue to follow

## 2018-08-01 NOTE — PLAN OF CARE
Problem: OCCUPATIONAL THERAPY ADULT  Goal: Performs self-care activities at highest level of function for planned discharge setting  See evaluation for individualized goals  Treatment Interventions: ADL retraining, Functional transfer training, Endurance training, Cognitive reorientation, Fine motor coordination activities  Equipment Recommended:  (RW)       See flowsheet documentation for full assessment, interventions and recommendations  Outcome: Progressing  Limitation: Decreased ADL status, Decreased Safe judgement during ADL, Decreased cognition, Decreased endurance, Decreased high-level ADLs, Decreased fine motor control     Assessment: Patient seen for OT tx session day 1  Pt was agreeable to working with OT upon session; however, pt was refusing to participate in bathroom ADLs  Pt reported to have not slept well and was lethargic  During the tx session, pt completed functional transfers, room negotiation w/ RW, LB dressing (don socks), and MOCA  During functional transfers pt required less physical A (min A x 1), but needed max VC for safety, hand placement, and controlled descend during stand > sit transfer  LB dressing (don/doff socks) was completed w/ S seated at chair and crossing legs, functional mobility completed w/ min A x 1 w/ VC for safety, RW management, and pace  Tx session was limited to fatigue, pt "did not feel like" doing ADLs  Pt still impulsive and demonstrates poor insight to deficits and safety precautions  Recommendation stands for post acute rehab, pt still in need of RW for functional mobility  Will continue to follow       OT Discharge Recommendation:  (Post Acute Rehab)  OT - OK to Discharge:  (When medically stable)

## 2018-08-02 ENCOUNTER — HOSPITAL ENCOUNTER (INPATIENT)
Facility: HOSPITAL | Age: 58
LOS: 11 days | Discharge: HOME | DRG: 057 | End: 2018-08-13
Attending: PHYSICAL MEDICINE & REHABILITATION | Admitting: PHYSICAL MEDICINE & REHABILITATION
Payer: COMMERCIAL

## 2018-08-02 VITALS
SYSTOLIC BLOOD PRESSURE: 161 MMHG | RESPIRATION RATE: 18 BRPM | OXYGEN SATURATION: 96 % | BODY MASS INDEX: 37.56 KG/M2 | HEIGHT: 68 IN | TEMPERATURE: 98.6 F | DIASTOLIC BLOOD PRESSURE: 87 MMHG | WEIGHT: 247.8 LBS | HEART RATE: 77 BPM

## 2018-08-02 DIAGNOSIS — I10 ESSENTIAL HYPERTENSION: Chronic | ICD-10-CM

## 2018-08-02 DIAGNOSIS — E11.65 TYPE 2 DIABETES MELLITUS WITH HYPERGLYCEMIA (HCC): Primary | Chronic | ICD-10-CM

## 2018-08-02 DIAGNOSIS — I63.9 ISCHEMIC CEREBROVASCULAR ACCIDENT (CVA) (HCC): ICD-10-CM

## 2018-08-02 DIAGNOSIS — N17.9 AKI (ACUTE KIDNEY INJURY) (HCC): ICD-10-CM

## 2018-08-02 DIAGNOSIS — N18.9 CKD (CHRONIC KIDNEY DISEASE): ICD-10-CM

## 2018-08-02 PROBLEM — Z92.82 RECEIVED INTRAVENOUS TISSUE PLASMINOGEN ACTIVATOR (T-PA) IN EMERGENCY DEPARTMENT: Status: ACTIVE | Noted: 2018-08-02

## 2018-08-02 LAB
GLUCOSE SERPL-MCNC: 119 MG/DL (ref 65–140)
GLUCOSE SERPL-MCNC: 123 MG/DL (ref 65–140)
GLUCOSE SERPL-MCNC: 176 MG/DL (ref 65–140)
GLUCOSE SERPL-MCNC: 182 MG/DL (ref 65–140)
TSH SERPL DL<=0.05 MIU/L-ACNC: 1.95 UIU/ML (ref 0.36–3.74)
VIT B12 SERPL-MCNC: 237 PG/ML (ref 100–900)

## 2018-08-02 PROCEDURE — 82607 VITAMIN B-12: CPT | Performed by: PSYCHIATRY & NEUROLOGY

## 2018-08-02 PROCEDURE — 82948 REAGENT STRIP/BLOOD GLUCOSE: CPT

## 2018-08-02 PROCEDURE — 82306 VITAMIN D 25 HYDROXY: CPT | Performed by: PSYCHIATRY & NEUROLOGY

## 2018-08-02 PROCEDURE — 99239 HOSP IP/OBS DSCHRG MGMT >30: CPT | Performed by: PHYSICIAN ASSISTANT

## 2018-08-02 PROCEDURE — 99232 SBSQ HOSP IP/OBS MODERATE 35: CPT | Performed by: NURSE PRACTITIONER

## 2018-08-02 PROCEDURE — 84443 ASSAY THYROID STIM HORMONE: CPT | Performed by: PSYCHIATRY & NEUROLOGY

## 2018-08-02 RX ORDER — ATORVASTATIN CALCIUM 80 MG/1
80 TABLET, FILM COATED ORAL
Status: DISCONTINUED | OUTPATIENT
Start: 2018-08-02 | End: 2018-08-13 | Stop reason: HOSPADM

## 2018-08-02 RX ORDER — ATORVASTATIN CALCIUM 40 MG/1
80 TABLET, FILM COATED ORAL
Status: CANCELLED | OUTPATIENT
Start: 2018-08-02

## 2018-08-02 RX ORDER — ASPIRIN 81 MG/1
81 TABLET ORAL DAILY
Status: DISCONTINUED | OUTPATIENT
Start: 2018-08-03 | End: 2018-08-13 | Stop reason: HOSPADM

## 2018-08-02 RX ORDER — CARVEDILOL 6.25 MG/1
6.25 TABLET ORAL 2 TIMES DAILY WITH MEALS
Status: DISCONTINUED | OUTPATIENT
Start: 2018-08-02 | End: 2018-08-06

## 2018-08-02 RX ORDER — ASPIRIN 81 MG/1
81 TABLET ORAL DAILY
Status: CANCELLED | OUTPATIENT
Start: 2018-08-03

## 2018-08-02 RX ORDER — CARVEDILOL 6.25 MG/1
6.25 TABLET ORAL 2 TIMES DAILY WITH MEALS
Status: CANCELLED | OUTPATIENT
Start: 2018-08-02

## 2018-08-02 RX ORDER — ACETAMINOPHEN 650 MG/1
650 SUPPOSITORY RECTAL EVERY 4 HOURS PRN
Status: CANCELLED | OUTPATIENT
Start: 2018-08-02

## 2018-08-02 RX ORDER — LORAZEPAM 0.5 MG/1
0.5 TABLET ORAL ONCE
Status: COMPLETED | OUTPATIENT
Start: 2018-08-02 | End: 2018-08-02

## 2018-08-02 RX ORDER — POLYETHYLENE GLYCOL 3350 17 G/17G
17 POWDER, FOR SOLUTION ORAL DAILY PRN
Status: DISCONTINUED | OUTPATIENT
Start: 2018-08-02 | End: 2018-08-13

## 2018-08-02 RX ORDER — FAMOTIDINE 20 MG/1
20 TABLET, FILM COATED ORAL DAILY
Status: DISCONTINUED | OUTPATIENT
Start: 2018-08-03 | End: 2018-08-13 | Stop reason: HOSPADM

## 2018-08-02 RX ORDER — ACETAMINOPHEN 325 MG/1
650 TABLET ORAL EVERY 6 HOURS PRN
Status: DISCONTINUED | OUTPATIENT
Start: 2018-08-02 | End: 2018-08-13

## 2018-08-02 RX ORDER — LABETALOL HYDROCHLORIDE 5 MG/ML
10 INJECTION, SOLUTION INTRAVENOUS EVERY 4 HOURS PRN
Status: CANCELLED | OUTPATIENT
Start: 2018-08-02

## 2018-08-02 RX ORDER — FAMOTIDINE 20 MG/1
20 TABLET, FILM COATED ORAL DAILY
Status: CANCELLED | OUTPATIENT
Start: 2018-08-03

## 2018-08-02 RX ORDER — INSULIN ASPART 100 [IU]/ML
35 INJECTION, SUSPENSION SUBCUTANEOUS
Status: DISCONTINUED | OUTPATIENT
Start: 2018-08-02 | End: 2018-08-13 | Stop reason: HOSPADM

## 2018-08-02 RX ORDER — INSULIN ASPART 100 [IU]/ML
35 INJECTION, SUSPENSION SUBCUTANEOUS
Status: CANCELLED | OUTPATIENT
Start: 2018-08-02

## 2018-08-02 RX ADMIN — INSULIN LISPRO 2 UNITS: 100 INJECTION, SOLUTION INTRAVENOUS; SUBCUTANEOUS at 13:31

## 2018-08-02 RX ADMIN — INSULIN ASPART 35 UNITS: 100 INJECTION, SUSPENSION SUBCUTANEOUS at 08:19

## 2018-08-02 RX ADMIN — FAMOTIDINE 20 MG: 20 TABLET ORAL at 08:16

## 2018-08-02 RX ADMIN — INSULIN ASPART 35 UNITS: 100 INJECTION, SUSPENSION SUBCUTANEOUS at 16:31

## 2018-08-02 RX ADMIN — INSULIN LISPRO 2 UNITS: 100 INJECTION, SOLUTION INTRAVENOUS; SUBCUTANEOUS at 08:15

## 2018-08-02 RX ADMIN — LORAZEPAM 0.5 MG: 0.5 TABLET ORAL at 13:31

## 2018-08-02 RX ADMIN — CARVEDILOL 6.25 MG: 6.25 TABLET, FILM COATED ORAL at 16:31

## 2018-08-02 RX ADMIN — CARVEDILOL 6.25 MG: 6.25 TABLET, FILM COATED ORAL at 08:16

## 2018-08-02 RX ADMIN — ATORVASTATIN CALCIUM 80 MG: 80 TABLET, FILM COATED ORAL at 16:31

## 2018-08-02 RX ADMIN — ASPIRIN 81 MG: 81 TABLET, COATED ORAL at 08:16

## 2018-08-02 NOTE — SPEECH THERAPY NOTE
Speech Language/Pathology    SLP screened pt, he reports he is doing well today and eating fine  Pt to be discharged this afternoon to [facility]  Recommend ST follow up in [facility], consider speech/cog eval in addition to dysphagia treatment  Diet level entered into pt's discharge instructions

## 2018-08-02 NOTE — ASSESSMENT & PLAN NOTE
Lab Results   Component Value Date    HGBA1C 7 5 (H) 07/28/2018       Recent Labs      08/01/18   1614  08/01/18   2107  08/02/18   0745  08/02/18   1122   POCGLU  293*  196*  176*  182*       Blood Sugar Average: Last 72 hrs:  (P) 927 [id number]

## 2018-08-02 NOTE — PROGRESS NOTES
Neurology - Progress Note  Annabelle Cordero 62 y o  male MRN: [id number]  Unit/Bed#: -01 Encounter: [id number]    Assessment:  Late Acute/early subacute left mca subcortical stroke s/p IV tpa Day 3 , likely small vessel thrombosis lacunar in context of inadequately controlled vascular risk factors, particularly cholesterol (trigylcerides) and uncontrolled blood sugars  No additional strokes noted on mri brain scan compared to ct head scan earlier in hospital course  Likely has early vascular dementia vs cognitive impairment and with diminished cognitive reserve has been more confused than his recent baseline over the past few months  Plan:  No further inpatient recs  Family interested in [employer] in [place]  Continue aspirin 81 mg daily and start lipitor 80 mg daily for secondary stroke prevention  We discussed diet and exercise and family states he has been unwilling to change his diet and does not exercise  I recommended making small incremental diet recommendations and family will be obtaining treadmill for him at home as I recommended daily walking on treadmill  Outpatient neurocognitive testing, we will have our office call patient to set this up  Follow up with Dr Jaz Matos stroke neuorologist in 4-6 weeks  TSH, B12, vit D for tomorrow morning    ROS:  Per 12 point review, no current complaints other than feeling tired  Vitals: Blood pressure 140/75, pulse 72, temperature 98 °F (36 7 °C), temperature source Oral, resp  rate 20, height 5' 8" (1 727 m), weight 112 kg (247 lb 12 8 oz), SpO2 95 %  ,Body mass index is 37 68 kg/m²  Physical Exam:    General appearance: alert, appears stated age and cooperative  Lungs: clear to auscultation bilaterally  Heart: regular rate and rhythm      Neurologic: Mental status: Alert, orientedX3, thought content appropriate during exam however attention/concentration mildly impaired   No obvious expressive/receptive aphasia on exam however at times hesitation with answers and at times some slowness in response  CN 2-12: still aniscoria with L>R however slight improvement in ptosis  Slight dysarthria, improved  Rest of cranial nerves intact  Motor: 5 power ue/le bilat during testing except rt handgrip 4+, improved from day before  Lab, Imaging and other studies: I have personally reviewed pertinent reports  Total 36 min spent examining patient and coming up with assessment/plan with>50% of the time spent counseling patient and family

## 2018-08-02 NOTE — ASSESSMENT & PLAN NOTE
· Acute left basal ganglia CVA  CTA with severe focal stenosis of the distal right petrous/cavernous ICA junction, moderate focal right supraclinoid ICA stenosis, mild ostial stenosis of the bilateral vertebral arteries, moderate focal intracranial left vertebral stenosis and mild left atherosclerotic calcification of the right carotid bifurcation  Patient is feeling better today  Strength is 5/5 and symmetrical  Some coordination deficits noted     · Neurology cleared the patient - accepted at [facility]  · Continue ASA 81 mg QD, Lipitor 80 mg QD  · Stable for discharge

## 2018-08-02 NOTE — ASSESSMENT & PLAN NOTE
· Wife reports that cognition and memory is improved today   ?Related to L Basal Ganglia infarct  · Management as per primary

## 2018-08-02 NOTE — DISCHARGE SUMMARY
[facility]  Discharge- Erin Severe 1960, 62 y o  male MRN: [id number]    Unit/Bed#: -01 Encounter: [id number]    Primary Care Provider: Munira Lucas DO   Date and time admitted to hospital: 7/29/2018 12:15 PM        * CVA (cerebral vascular accident) [facility])   Assessment & Plan    · Acute left basal ganglia CVA  CTA with severe focal stenosis of the distal right petrous/cavernous ICA junction, moderate focal right supraclinoid ICA stenosis, mild ostial stenosis of the bilateral vertebral arteries, moderate focal intracranial left vertebral stenosis and mild left atherosclerotic calcification of the right carotid bifurcation  Patient is feeling better today  Strength is 5/5 and symmetrical  Some coordination deficits noted  · Neurology cleared the patient - accepted at [facility]  · Continue ASA 81 mg QD, Lipitor 80 mg QD  · Stable for discharge         Type 2 diabetes mellitus with hyperglycemia [facility])   Assessment & Plan    Lab Results   Component Value Date    HGBA1C 7 5 (H) 07/28/2018       Recent Labs      08/01/18   1148  08/01/18   1614  08/01/18   2107  08/02/18   0745   POCGLU  254*  293*  196*  176*       Blood Sugar Average: Last 72 hrs:  (P) 504 [id number]     · A1c noted to be 7 5   BG overall relatively controlled over the last 24 hours   · Continue 70/30 with SSI coverage  · QID accuchecks         Hyperlipidemia   Assessment & Plan    · Continue statin  · Dietary education and lifestyle modifications        Essential hypertension   Assessment & Plan    · BP controlled  · Continue home Coreg 6 25 mg BID        CKD (chronic kidney disease) stage 3, GFR 30-59 ml/min   Assessment & Plan    · Baseline creatinine 1 8-2 2 currently at baseline  · Outpatient follow-up with Dr Sis Escobar          Hemiplegia [facility])   Assessment & Plan    · Strength appears symmetrical  Bilateral coordination deficits noted   · Management as primary         Encephalopathy   Assessment & Plan    · Wife reports that cognition and memory is improved today  ?Related to L Basal Ganglia infarct  · Management as per primary           Discharging Physician / Practitioner: Linette Marino PA-C  PCP: Areli Gerard DO  Admission Date:   Admission Orders     Ordered        07/29/18 1305  Inpatient Admission (expected length of stay for this patient is greater than two midnights)  Once             Discharge Date: 08/02/18    Disposition:      [facility] at [address]  to [facility] SNF:   · Not Applicable to this Patient - Not Applicable to this Patient    Reason for Admission: L Basal Ganglia Infarct     Discharge Diagnoses:     Please see assessment and plan section above for further details regarding discharge diagnoses  Resolved Problems  Date Reviewed: 8/2/2018          Resolved    Aphasia 7/30/2018     Resolved by  Shamar Munguia, [address] [place] [address] Stay:  · Neurology - Dr Zuniga Prom  · PT/OT/Speech    Procedures Performed:     · tPA  · CT Stroke Alert Brain  · CT Head without contrast  · CT Head without contrast status post tPA  · MRI Brain   · Echo    Medication Adjustments and Discharge Medications:  · Summary of Medication Adjustments made as a result of this hospitalization: ASA, Lipitor added, Coreg increased   · Medication Dosing Tapers - Please refer to Discharge Medication List for details on any medication dosing tapers (if applicable to patient)  · Medications being temporarily held (include recommended restart time): Lovastatin DISCONTINUED   · Discharge Medication List: See after visit summary for reconciled discharge medications  Wound Care Recommendations:  When applicable, please see wound care section of After Visit Summary  Diet Recommendations at Discharge:  Diet -        Diet Orders            Start     Ordered    07/30/18 1634  Diet Mati/CHO Controlled; Consistent Carbohydrate Diet Level 2 (5 carb servings/75 grams CHO/meal);  Cardiac Step 1  Diet effective now     Question Answer Comment   Diet Type Mati/CHO Controlled    Mati/CHO Controlled Consistent Carbohydrate Diet Level 2 (5 carb servings/75 grams CHO/meal)    Other Restriction(s): Cardiac Step 1    RD to adjust diet per protocol? Yes        07/30/18 1633    07/29/18 1435  Room Service  Once     Question:  Type of Service  Answer:  Room Service - Appropriate with Assistance    07/29/18 1434          Instructions for any Catheters / Lines Present at Discharge (including removal date, if applicable): None    Significant Findings / Test Results:     MRI brain wo contrast   Final Result by Bobby Gu MD (08/01 1335)      Recent ischemia, without hemorrhage in the left posterior perforating substance affecting hypothalamus, anterolateral thalamus, and genu of the left internal capsule  Workstation performed: [id number]         CT head wo contrast   Final Result by Zayda Emerson MD (07/30 1146)      Evolving ischemic change in the left basal ganglia  Workstation performed: [id number]         CT head wo contrast   Final Result by David Tee MD (07/29 1520)      No acute intracranial abnormality  Workstation performed: [id number]         XR stroke alert portable chest   Final Result by Gerardo Roblero MD (07/29 1331)      No acute cardiopulmonary disease  Workstation performed: [id number]         CT stroke alert brain   Final Result by Otto Ahuja DO (07/29 1256)      No acute intracranial abnormality  Microangiopathic changes  Chronic lacunar infarcts are noted  No hemorrhage  Findings were directly discussed with MEG OSMAN on 7/29/2018 12:13 PM       Workstation performed: [id number]         CTA stroke alert (head/neck)   Final Result by Otto Ahuja DO (07/29 1241)      Severe focal stenosis distal right petrous/cavernous ICA junction  Moderate focal right supraclinoid ICA stenosis        No large vessel occlusion  No intracranial aneurysm identified  Mild ostial stenosis bilateral vertebral arteries  Moderate focal intracranial left vertebral stenosis  Mild left atherosclerotic calcification right carotid bifurcation  I personally discussed this study with MEG OSMAN on 7/29/2018 at 12:35 PM                            Workstation performed: [id number]           · As above    Incidental Findings:   · None     Test Results Pending at Discharge (will require follow up): · None     Outpatient Tests Requested:  · Outpatient follow up with neurology     Complications:  None    Hospital Course:     Galdino Bond is a 62 y o  male patient who originally presented to the hospital on 7/29/2018 due to right sided facial droop  The patient was a stroke alert on admission and the decision was made to administer tPA in the ER  The patient's symptoms initially improved after tPA and he was monitored in the ICU status post for stability  He was found to have no ICH after tPA administration and he continued to improve so he was transferred to med/surg  His MRI showed that the patient had a left basal ganglia infarction which was likely the cause for his symptoms  Given his stroke and tPA administration he was referred to the [facility] for rehab and was accepted there  Condition at Discharge: stable     Discharge Day Visit / Exam:     Subjective:  Patient reports that he is feeling well today  He denies chest pain, pressure, palpitations, numbness, or weakness  Wife reports that his memory and cognition is better today   He denies headaches or change in vision   Vitals: Blood Pressure: 161/87 (08/02/18 0739)  Pulse: 77 (08/02/18 0739)  Temperature: 98 6 °F (37 °C) (08/02/18 0739)  Temp Source: Oral (08/02/18 0739)  Respirations: 18 (08/02/18 0739)  Height: 5' 8" (172 7 cm) (07/29/18 1429)  Weight - Scale: 112 kg (247 lb 12 8 oz) (07/30/18 1609)  SpO2: 96 % (08/02/18 0739)  Exam:   Physical Exam Constitutional: He is oriented to person, place, and time  Vital signs are normal  He appears well-developed and well-nourished  Non-toxic appearance  No distress  HENT:   Head: Normocephalic and atraumatic  Eyes: Conjunctivae and EOM are normal  Pupils are equal, round, and reactive to light  Neck: Neck supple  Cardiovascular: Normal rate, regular rhythm, S1 normal, S2 normal, normal heart sounds and intact distal pulses  Exam reveals no S3 and no S4  No murmur heard  Pulmonary/Chest: Effort normal and breath sounds normal  No accessory muscle usage  No respiratory distress  He has no decreased breath sounds  He has no wheezes  He has no rhonchi  He has no rales  He exhibits no tenderness  Abdominal: Soft  Bowel sounds are normal  He exhibits no distension and no mass  There is no tenderness  There is no rigidity, no rebound and no guarding  Neurological: He is alert and oriented to person, place, and time  He has normal strength  He is not disoriented  No cranial nerve deficit or sensory deficit  Coordination abnormal  GCS eye subscore is 4  GCS verbal subscore is 5  GCS motor subscore is 6  Skin: Skin is warm and dry  Discussion with Family: Discussed with wife at bedside     Goals of Care Discussions:  · Code Status at Discharge: Level 1 - Full Code  · Were there any Goals of Care Discussions during Hospitalization?: No  · Results of any General Goals of Care Discussions: N/A   · POLST Completed: No   · If POLST Completed, Summary of POLST Agreement Provided Here: N/A   · OK to Rehospitalize if Needed? Yes    Discharge instructions/Information to patient and family:   See after visit summary section titled Discharge Instructions for information provided to patient and family  Planned Readmission: None      Discharge Statement:  I spent 45 minutes discharging the patient  This time was spent on the day of discharge  I had direct contact with the patient on the day of discharge  Greater than 50% of the total time was spent examining patient, answering all patient questions, arranging and discussing plan of care with patient as well as directly providing post-discharge instructions  Additional time then spent on discharge activities      ** Please Note: This note has been constructed using a voice recognition system **

## 2018-08-02 NOTE — SOCIAL WORK
Received voicemail from Aniyah Hawkins at [facility] making me aware that pt is set up for a 1345 w/c [facility] A[facility] transport via [facility] today [facility]  Patient, son, and nurse aware

## 2018-08-02 NOTE — ASSESSMENT & PLAN NOTE
Lab Results   Component Value Date    HGBA1C 7 5 (H) 07/28/2018       Recent Labs      08/01/18   1148  08/01/18   1614  08/01/18   2107  08/02/18   0745   POCGLU  254*  293*  196*  176*       Blood Sugar Average: Last 72 hrs:  (P) 366 [id number]     · A1c noted to be 7 5   BG overall relatively controlled over the last 24 hours   · Continue 70/30 with SSI coverage  · QID accuchecks

## 2018-08-02 NOTE — CONSULTS
Consultation - Kiran Vidal 62 y o  male MRN: [id number]    Unit/Bed#: [facility] 490-67 Encounter: [id number]        History of Present Illness     HPI: Kiran Vidal is a 62y o  year old male who presents with acute L CVA  Pt originally presented to [address] [place] [address] with acute onset of slurred speech, facial droop and R hemiparesis  He underwent tPA and had improvement of his symptoms, he also unfortunately had a small ICH post infusion however neurologically he remained stable  He historically is DM and not compliant with his  Medications  His latest HgbA1c was 9 5  The wife is present during this evaluation and states that he has indeed gone to diabetic education classes in the past, he just doesn't do what he's supposed to do  Echocardiogram showed an ef 55% otherwise no major abnormalities  He is coming to [facility] for aggressive stroke rehabilitation  ROS:  Constitutional: anxious about rehab and his overall prognosis    HENT: Negative  Respiratory: Negative  Cardiovascular: Negative  Gastrointestinal: Negative  Musculoskeletal: Negative  Neurological: R hemiparesis  Psychiatric/Behavioral: Negative  Historical Information   Past Medical History:   Diagnosis Date    Diabetes mellitus ([facility] )     Hypercholesteremia     Hyperlipidemia     Hypertension     Neuropathy     Obesity     [facility]     last assessed 11/4/16    PVC's (premature ventricular contractions)     sees cardiology Dr Lupe camargo     Past Surgical History:   Procedure Laterality Date    ABDOMINAL SURGERY      CHOLECYSTECTOMY      Percutaneous    OTHER SURGICAL HISTORY      "stimulator to control bowel movements"    NH ESOPHAGOGASTRODUODENOSCOPY TRANSORAL DIAGNOSTIC N/A 9/27/2016    Procedure: ESOPHAGOGASTRODUODENOSCOPY (EGD); Surgeon: Sveta Nicholson MD;  [facility];   Service: Gastroenterology    NH LAP,CHOLECYSTECTOMY N/A 2/29/2016    Procedure: LAPAROSCOPIC CHOLECYSTECTOMY ;  Surgeon: Marieta Cushing, DO;  Location: [facility];  Service: General    ROTATOR CUFF REPAIR Right     TOE AMPUTATION Right 10/28/2016    Procedure: 3RD TOE AMPUTATION ;  Surgeon: Madison Doyle DPM;  [facility];  Service:      Social History   History   Alcohol Use No     History   Drug Use No     History   Smoking Status    Never Smoker   Smokeless Tobacco    Never Used     Family History   Problem Relation Age of Onset    Leukemia Mother     Liver disease Mother     Lung cancer Mother         heavy smoker - 3 ppd    Heart disease Father     Liver disease Father     Multiple myeloma Sister     Breast cancer Sister     Urolithiasis Family     Alcohol abuse Neg Hx     Depression Neg Hx     Drug abuse Neg Hx     Substance Abuse Neg Hx        Meds/Allergies   current meds:  Current Facility-Administered Medications   Medication Dose Route Frequency    acetaminophen (TYLENOL) tablet 650 mg  650 mg Oral Q6H PRN    [START ON 8/3/2018] aspirin (ECOTRIN LOW STRENGTH) EC tablet 81 mg  81 mg Oral Daily    atorvastatin (LIPITOR) tablet 80 mg  80 mg Oral Daily With Dinner    carvedilol (COREG) tablet 6 25 mg  6 25 mg Oral BID With Meals    [START ON 8/3/2018] famotidine (PEPCID) tablet 20 mg  20 mg Oral Daily    insulin aspart protamine-insulin aspart (NovoLOG 70/30) 100 units/mL subcutaneous injection 35 Units  35 Units Subcutaneous BID AC    insulin lispro (HumaLOG) 100 units/mL subcutaneous injection 1-5 Units  1-5 Units Subcutaneous TID AC    insulin lispro (HumaLOG) 100 units/mL subcutaneous injection 1-5 Units  1-5 Units Subcutaneous HS    polyethylene glycol (MIRALAX) packet 17 g  17 g Oral Daily PRN       PTA meds:   Prescriptions Prior to Admission   Medication    Blood Pressure Monitoring (RELION BLOOD PRESSURE CUFF) MISC    carvedilol (COREG) 3 125 mg tablet    diphenoxylate-atropine (LOMOTIL) 2 5-0 025 mg per tablet    famotidine (PEPCID) 20 mg tablet    gabapentin (NEURONTIN) 600 MG tablet    insulin aspart protamine-insulin aspart (NOVOLOG MIX 70/30) 100 units/mL injection    lovastatin (MEVACOR) 20 mg tablet     No Known Allergies    Objective   Vitals: Blood pressure 162/85, pulse 74, temperature 97 9 °F (36 6 °C), temperature source Oral, resp  rate 20, height 5' 8" (1 727 m), weight 107 kg (236 lb 15 9 oz), SpO2 98 %  Physical Exam   Constitutional: Pleasant and interactive at bedside  HENT:  Normocephalic  EOM are normal  PERRLAt  Neck supple  Cardiovascular: S1 S2 regular; no MRG; trace edema    Pulmonary: Lungs are clear bibasilar; No respiratory distress  Pt has no wheezes nor rales  Abdominal: obese, Soft  Bowel sounds are normal  Pt exhibits no distension  There is no tenderness  There is no rebound and no guarding  Neurological: Pt is alert and oriented to person, place, and time  3/5 R hemiparesis is noted a/w facial droop  Psychiatric: Pt has a normal mood and affect         Lab Results:   Results from last 7 days  Lab Units 08/01/18  0515 07/31/18  0426   WBC Thousand/uL 7 75 7 89   HEMOGLOBIN g/dL 12 4 12 4   HEMATOCRIT % 37 7 37 3   PLATELETS Thousands/uL 146* 176       Results from last 7 days  Lab Units 08/01/18  0515 07/31/18  0426   SODIUM mmol/L 137 137   POTASSIUM mmol/L 4 1 4 2   CHLORIDE mmol/L 105 105   CO2 mmol/L 27 26   BUN mg/dL 25 26*   CREATININE mg/dL 2 25* 2 23*   GLUCOSE RANDOM mg/dL 154* 186*   CALCIUM mg/dL 9 0 8 6       Results from last 7 days  Lab Units 07/28/18  0712   HEMOGLOBIN A1C % 7 5*       Results from last 7 days  Lab Units 07/29/18  1215   INR  0 93       Glucose (mg/dL)   Date Value   08/01/2018 154 (H)   07/31/2018 186 (H)   07/30/2018 167 (H)   07/29/2018 212 (H)   01/03/2016 189 (H)   01/02/2016 178 (H)   01/01/2016 113   12/31/2015 128     Glucose, i-STAT (mg/dl)   Date Value   07/29/2018 213 (H)       Labs reviewed    Imaging: reviewed  EKG, Pathology, and Other Studies: I have personally reviewed pertinent reports  VTE Prophylaxis: Sequential compression device (Venodyne)     Code Status: Level 1 - Full Code   Advance Directive and Living Will:      Power of :    POLST:      Assessment/Plan     # L CVA:  S/p tPA w/small ICH post infusion; R hemiparesis improving, speech and facial droop improving as well; f/u neurology on d/c; cont ASA/Statin as well as aggressive lifestyle modifications; aggressive therapy per PMR    # DM II:  Previously uncontrolled d/t non compliance; will cont current regimen for now and titrate based on trend; need to keep much tighter control; long d/w wife and pt regarding better compliance    # CKD III:  Previous baseline was around 1 8; most recently around 2 2, will cont to monitor trend, currently off ACE inhibitor; if drops below 2 and maintains, will resume low dose ACE    # HTN:  Cont current medications; if creatinine remains stable would add back aCE    # h/o spinal stimulator:  Stable    # Non compliance:  Cont to stress the importance of compliance    Counseling / Coordination of Care  Total floor / unit time spent today 45 minutes  Greater than 50% of total time was spent with the patient and / or family counseling and / or coordination of care        Jessica Rosas

## 2018-08-02 NOTE — PLAN OF CARE
Problem: DISCHARGE PLANNING - CARE MANAGEMENT  Goal: Discharge to post-acute care or home with appropriate resources  INTERVENTIONS:  - Conduct assessment to determine patient/family and health care team treatment goals, and need for post-acute services based on payer coverage, community resources, and patient preferences, and barriers to discharge  - Address psychosocial, clinical, and financial barriers to discharge as identified in assessment in conjunction with the patient/family and health care team  - Arrange appropriate level of post-acute services according to patient's   needs and preference and payer coverage in collaboration with the physician and health care team  - Communicate with and update the patient/family, physician, and health care team regarding progress on the discharge plan  - Arrange appropriate transportation to post-acute venues   Outcome: Completed Date Met: 08/02/18  Received voicemail from Ciara Agustin at [facility] aware that pt is set up for a [phone] w/c [employer] transport via [address] today [facility]  Patient, son, and nurse aware

## 2018-08-02 NOTE — PROGRESS NOTES
PHYSICAL MEDICINE AND REHABILITATION   PREADMISSION ASSESSMENT     Projected [facility] Diagnoses:  Impairment of mobility, safety, Activities of Daily Living (ADLs), and cognitive/communication skills due to Stroke:  01 2  Right Body Involvement (Left Brain)  Etiologic: left basal ganglia infarct  Date of Onset: 7/29/18   Date of surgery: N/A    PATIENT INFORMATION  Name: Avelina Gonzalez Phone #: [phone] (home)   Address: [address] [place] [address]  YOB: 1960 Age: 62 y o  SS#   Marital Status: /Civil Union  Ethnicity:   Employment Status: currently employed as a   Extended Emergency [address]  Primary Emergency Contact: [address] [place] [address] Phone: [phone]  Relation: Son  Secondary Emergency Contact: Alan Ghosh  Address: [address] Phone: [phone]  Mobile Phone: [phone]  Relation: Spouse  Advance Directive: Level 1 Full Code, Unknown Advanced Directive    INSURANCE/COVERAGE:     Primary Payor: Paty Flores / Plan: Rolene Sit HMO / Product Type: HMO Commercial /   Secondary Payer: Peg Albino   Payer Contact: Soo Devi Payer Contact:   Contact Phone: [phone]  Contact Fax: [phone] Contact Phone:   Authorization #: [id number]  Coverage Dates: 8/2/18 - 8/6/18  LCD: 8/6/18 with an update due that day  Medicare Days:  Medical Record #: [id number]    REFERRAL SOURCE:   Referring provider: Gary Gil MD  Referring facility: [address] [place] [address]   Room: /-14  PCP: Jessie Grier DO PCP phone number: [phone]    MEDICAL INFORMATION  HPI: Patient is a 62year old male who presented to the [address] [place] [address] on 7/29/18 after family noted weakness and slurring of speech   Upon presentation the patient continued with right sided weakness of the upper and lower extremities, right sided facial droop, slurring of speech and tongue deviation to the right  Imaging did confirm that the patient had sustained a left basal ganglia infarct  The patient was a stroke alert on admission and the decision was made to administer tPA in the ER  The patient's symptoms initially improved after tPA and he was monitored in the ICU status post for stability  He was found to have no ICH after tPA administration and he continued to improve so he was transferred to med/surg  His MRI showed that the patient had a left basal ganglia infarction which was likely the cause for his symptoms  Both PT/OT were consulted on the patient and after evaluation, recommended the patient for inpatient acute rehab  The patient was approved by insurance and has been cleared by his attending today  The patient will transfer to [address] [place] [address] in [place] today  Past Medical History:   Past Surgical History: Allergies:     Past Medical History:   Diagnosis Date    Diabetes mellitus ([facility] )     Hypercholesteremia     Hyperlipidemia     Hypertension     Neuropathy     Obesity     Osteomyelitis ([facility] )     last assessed 11/4/16    PVC's (premature ventricular contractions)     sees cardiology Dr Víctor camargo    Past Surgical History:   Procedure Laterality Date    ABDOMINAL SURGERY      CHOLECYSTECTOMY      Percutaneous    OTHER SURGICAL HISTORY      "stimulator to control bowel movements"    PA ESOPHAGOGASTRODUODENOSCOPY TRANSORAL DIAGNOSTIC N/A 9/27/2016    Procedure: ESOPHAGOGASTRODUODENOSCOPY (EGD); Surgeon: Sarah Smith MD;  [facility];   Service: Gastroenterology    PA LAP,CHOLECYSTECTOMY N/A 2/29/2016    Procedure: LAPAROSCOPIC CHOLECYSTECTOMY ;  Surgeon: Manuela Jacobo DO;  [facility];  Service: General    ROTATOR CUFF REPAIR Right     TOE AMPUTATION Right 10/28/2016    Procedure: 3RD TOE AMPUTATION ;  Surgeon: Juan Izquierdo DPM;  [facility];  Service:      No Known Allergies      Comorbidities: encephalopathy, type 2 DM, hyperlipidemia, essential hypertension, CKD stage 3 (baseline creatinine 1 8-2 2), hemiplegia    CURRENT VITAL SIGNS:   Temp:  [98 °F (36 7 °C)-98 6 °F (37 °C)] 98 6 °F (37 °C)  HR:  [72-78] 77  Resp:  [18-20] 18  BP: (134-161)/(69-87) 161/87   Intake/Output Summary (Last 24 hours) at 08/02/18 1014  Last data filed at 08/01/18 1734   Gross per 24 hour   Intake              460 ml   Output              200 ml   Net              260 ml        LABORATORY RESULTS:      Lab Results   Component Value Date    HGB 12 4 08/01/2018    HGB 14 0 01/27/2016    HCT 37 7 08/01/2018    HCT 43 0 01/27/2016    WBC 7 75 08/01/2018    WBC 6 1 01/27/2016     Lab Results   Component Value Date    BUN 25 08/01/2018    BUN 15 08/10/2016     08/01/2018     08/10/2016    K 4 1 08/01/2018    K 4 4 08/10/2016     08/01/2018     08/10/2016    GLUCOSE 154 (H) 08/01/2018    GLUCOSE 213 (H) 07/29/2018    GLUCOSE 189 (H) 01/03/2016    CREATININE 2 25 (H) 08/01/2018    CREATININE 1 20 08/10/2016     Lab Results   Component Value Date    PROTIME 12 2 07/29/2018    PROTIME 10 5 08/22/2016    INR 0 93 07/29/2018    INR 1 0 08/22/2016        DIAGNOSTIC STUDIES:  Ct Head Wo Contrast    Result Date: 7/30/2018  Impression: Evolving ischemic change in the left basal ganglia  Workstation performed: [id number]     Ct Head Wo Contrast    Result Date: 7/29/2018  Impression: No acute intracranial abnormality  Workstation performed: [id number]     Mri Brain Wo Contrast    Result Date: 8/1/2018  Impression: Recent ischemia, without hemorrhage in the left posterior perforating substance affecting hypothalamus, anterolateral thalamus, and genu of the left internal capsule  Workstation performed: [id number]     Xr Stroke Alert Portable Chest    Result Date: 7/29/2018  Impression: No acute cardiopulmonary disease   Workstation performed: [id number]     Ct Stroke Alert Brain    Result Date: 7/29/2018  Impression: No acute intracranial abnormality  Microangiopathic changes  Chronic lacunar infarcts are noted  No hemorrhage  Findings were directly discussed with MEG OSMAN on 7/29/2018 12:13 PM  Workstation performed: [id number]     Cta Stroke Alert (head/neck)    Result Date: 7/29/2018  Impression: Severe focal stenosis distal right petrous/cavernous ICA junction  Moderate focal right supraclinoid ICA stenosis  No large vessel occlusion  No intracranial aneurysm identified  Mild ostial stenosis bilateral vertebral arteries  Moderate focal intracranial left vertebral stenosis  Mild left atherosclerotic calcification right carotid bifurcation    I personally discussed this study with MEG OSMAN on 7/29/2018 at 12:35 PM  Workstation performed: [id number]       PRECAUTIONS/SPECIAL NEEDS:  Tobacco:   History   Smoking Status    Never Smoker   Smokeless Tobacco    Never Used   , Alcohol:    History   Alcohol Use No   , Anticoagulation:  aspirin 81 mg orally every day, Blood Sugar Management: per MD recommendations, Edema Management, Safety Concerns, Pain Management, IV: Type: peripheral Location: left arm Reason: medications and fluids, Language Preference: speaks mainly Maori but is able to converse in [place], and fall precautions    MEDICATIONS:     Current Facility-Administered Medications:     acetaminophen (TYLENOL) rectal suppository 650 mg, 650 mg, Rectal, Q4H PRN, BRITTANY Lamar    aspirin (ECOTRIN LOW STRENGTH) EC tablet 81 mg, 81 mg, Oral, Daily, Connor Bangura MD, 81 mg at 08/02/18 0816    atorvastatin (LIPITOR) tablet 80 mg, 80 mg, Oral, Daily With Dinner, Ravi Joseph MD    carvedilol (COREG) tablet 6 25 mg, 6 25 mg, Oral, BID With Meals, Connor Bangura MD, 6 25 mg at 08/02/18 0816    famotidine (PEPCID) tablet 20 mg, 20 mg, Oral, Daily, BRITTANY Mcdaniel, 20 mg at 08/02/18 0816    insulin aspart protamine-insulin aspart (NovoLOG 70/30) 100 units/mL subcutaneous injection 35 Units, 35 Units, Subcutaneous, BID AC, Lavon Sims MD, 35 Units at 08/02/18 0819    insulin lispro (HumaLOG) 100 units/mL subcutaneous injection 1-6 Units, 1-6 Units, Subcutaneous, HS, BRITTANY Mcdaniel, 2 Units at 08/01/18 2141    insulin lispro (HumaLOG) 100 units/mL subcutaneous injection 2-12 Units, 2-12 Units, Subcutaneous, TID AC, 2 Units at 08/02/18 0815 **AND** Fingerstick Glucose (POCT), , , TID AC, BRITTANY Mcdaniel    labetalol (NORMODYNE) injection 10 mg, 10 mg, Intravenous, Q4H PRN, Guille Penn PA-C, 10 mg at 08/01/18 4021    SKIN INTEGRITY:   no rashes, no erythema, no peripheral edema    PRIOR LEVEL OF FUNCTION:  He lives in a(n) single family home  Tiffany Gar is  and lives with their family  (spouse and children [ages 16, 25, 32, 33])  Self Care: Independent, Indoor Mobility: Independent, Stairs (in/outdoor): Independent and Cognition: Independent    HOME ENVIRONMENT:  The living area: can live on one level  There 3 steps to enter the home  The patient will not have 24 hour supervision/physical assistance available upon discharge  PREVIOUS DME:  Equipment in home (previous DME): grab bars in the shower/ built in shower chair    FUNCTIONAL STATUS:  Physical Therapy Occupational Therapy Speech Therapy   8/1/18    Bed Mobility   Supine to Sit Unable to assess   Additional Comments pt OOB in chair pre & post session   Transfers   Sit to Stand 4  Minimal assistance   Additional items Assist x 1; Armrests; Verbal cues; Impulsive   Stand to Sit 4  Minimal assistance   Additional items Assist x 1; Armrests; Impulsive;Verbal cues   Stand pivot 4  Minimal assistance   Additional items Assist x 1; Armrests; Impulsive;Verbal cues   Additional Comments pt impulsive, require cues for techniques & pacing   Ambulation/Elevation   Gait pattern Antalgic;Decreased foot clearance;Decreased R stance;R Knee Maykel; Inconsistent lubna  (impulsive; R knee buckling at times)   Gait Assistance 4  Minimal assist   Additional items Assist x 1;Verbal cues; Tactile cues   Assistive Device Rolling walker   Distance 240'x1   Stair Management Assistance Not tested   Balance   Static Sitting Good   Static Standing Fair -   Ambulatory Poor +   Activity Tolerance   Activity Tolerance Patient tolerated treatment well   Nurse Made Aware yes   Exercises   TKR Sitting;Standing;10 reps;AROM; Bilateral   Assessment   Prognosis Good   Problem List Decreased strength;Decreased endurance; Impaired balance;Decreased mobility; Decreased coordination;Decreased cognition; Impaired judgement;Decreased safety awareness; Obesity   Assessment Pt seen for PT per POC  Improved mobility & activity tolerance noted this tx session  See above levels of assistance required for all functional tasks  Gait deviations as above but no gross LOB noted  Pt impulsive & require cues for proper pacing during thera ex & overall activity as well as cues for mobility safety techniques  (+) R knee buckling at times but pt able to catch self + minAx1 for safety  Pt tolerated above mentioned thera  ex well  No dizziness reported t/o session  Most recent vital signs as follows: /81 (BP Location: Left arm)   Pulse 78   Temp 98 °F (36 7 °C) (Oral)   Resp 20   Ht 5' 8" (1 727 m)   Wt 112 kg (247 lb 12 8 oz)   SpO2 95%   BMI 37 68 kg/m²   Limited PT session as pt leaving unit for MRI  Will continue PT per POC  At end of session, pt left room via transport chair in stable condition, transported to MRI    Will continue to recommend inpt rehab at D/C  CM to follow  Nsg staff to continue to mobilized pt (OOB in chair for all meals & ambulate in room/unit) as tolerated to prevent decline in function  Nsg notified         8/1/18    ADL   Where Assessed Chair   Eating Assistance 6  Modified independent   Eating Deficit Supervision/safety  (Pt had eaten breakfast upon evaluation)   LB Dressing Assistance 5  Supervision/Setup   LB Dressing Deficit (Pt don/doff socks seated in chair (crossed legs))   LB Dressing Comments Progression from eval, pt was able to cross leg over knee and maintain midline seated position to don/doff socks  Functional Standing Tolerance   Time 1 min   Comments Navigated in room from EOB to recliner chair   Bed Mobility   Supine to Sit 5  Supervision   Additional items HOB elevated; Increased time required; Bedrails   Additional Comments Pt presented in bed w/ HOB elevated upon evaluation   Transfers   Sit to Stand 4  Minimal assistance   Additional items Assist x 1; Armrests; Increased time required; Impulsive;Verbal cues   Stand to Sit 4  Minimal assistance   Additional items Assist x 1; Increased time required; Impulsive;Verbal cues;Armrests   Additional Comments Pt requires less physical A however; Pt requires extensive VC for hand placement during transfers  Functional Mobility   Functional Mobility 4  Minimal assistance   Additional Comments VC needed for appropriate pace and RW management   Additional items Rolling walker   Coordination   Fine Motor Able to manipulate pencil during MOCA but unable to maintain tripod pinch  Grasp was initially weak but progressed  Cognition   Overall Cognitive Status Impaired   Arousal/Participation Alert; Responsive;Lethargic   Attention Attends with cues to redirect   Orientation Level Oriented X4  (Date required + time and was inconsistent through tx session)   Memory Decreased long term memory;Decreased short term memory;Decreased recall of recent events;Decreased recall of precautions   Following Commands Follows one step commands inconsistently   Comments Pt identified by full name and   Pt presented w/ high levels on confusion and decreased memory  Pt was slow to report his  and unable to report wife's  and age w/o significant prompts and cues  Pt also reported to not know anything about his kids   As per his wife who was present, ? honest attempt to recall information from memory, wife commented "you know these things" and pt would grin  Cognition Assessment Tools MOCA   Score 17   Additional Activities   Additional Activities Comments Post treatment pt was in recliner chair w/ wife and MD present   Activity Tolerance   Activity Tolerance Patient limited by fatigue   Medical Staff Made Aware OT Sultana   Assessment   Assessment Patient seen for OT tx session day 1  Pt was agreeable to working with OT upon session; however, pt was refusing to participate in bathroom ADLs  Pt reported to have not slept well and was lethargic  During the tx session, pt completed functional transfers, room negotiation w/ RW, LB dressing (don socks), and MOCA  During functional transfers pt required less physical A (min A x 1), but needed max VC for safety, hand placement, and controlled descend during stand > sit transfer  LB dressing (don/doff socks) was completed w/ S seated at chair and crossing legs, functional mobility completed w/ min A x 1 w/ VC for safety, RW management, and pace  Tx session was limited to fatigue, pt "did not feel like" doing ADLs  Pt still impulsive and demonstrates poor insight to deficits and safety precautions  Recommendation stands for post acute rehab, pt still in need of RW for functional mobility  Will continue to follow  Pt completed [facility] Cognitive Assessment ([address] [place] [address], [place]) on 2018 and scored 17/30 indicating MODERATE cognitive deficits, the following outlines scoring per section:     Visuospatial/Executive: 3/5; unable to complete pattern in item 1 or copy cube    Namin/3; unable to report rhino, referred to camel as kangaroo  Memory: Repeated words correctly  Attention: 5/6; unable to sequentially subtract  Language: 2/3; 3 words w/ 'F'   Abstraction: 0/2;   Delayed Recall: 0/5; unable to recall after hints  Orientation: 6/6      MOCA was completed on 18    Swallow Information   Current Risks for Dysphagia & Aspiration: CVA     Current Symptoms/Concerns: L facial droop    Current Diet: NPO      Baseline Diet: regular diet and thin liquids       Baseline Assessment   Behavior/Cognition: alert; oriented to person, general place, and month but no other aspects of time; not oriented to situation  Distractible with occasional cues needed for redirection to task  Family members at b/s report that at times yesterday and today he has not recognized them, and that his orientation has also varied  The pt's response to simple and biographical questions actually varied throughout the session; e g , when asked two different times what kind of car he drives, he gave two different answers, neither of which were correct per the family  Speech/Language Status: able to participate in conversation and able to follow commands, albeit tangential in conversation  The pt speaks conversational English but is a native speaker of Vietnamese  Family reports that pt's speech and voice are still not at baseline but are improved from yesterday   He has demonstrated some speech errors in Vietnamese per family        Patient Positioning: upright in bed     Pain Status/Interventions/Response to Interventions:  No report of or nonverbal indications of pain         Swallow Mechanism Exam   Facial: left facial droop  Labial: decreased strength and decreased ROM left side  Lingual: WFL  Velum: symmetrical  Mandible: adequate ROM  Dentition: edentulous, does not normally wear dentures to eat  Vocal quality: rough with variable degrees of hoarseness from moment to moment; ?VF paralysis; not baseline per family   Volitional Cough: strong/productive   Swallow Mechanics: timely and coordinated hyolaryngeal elevation with dry swallow           Consistencies Assessed and Performance   Consistencies Administered: ice chips, thin liquids and hard solids  Specific materials administered included ice water via cup and straw, and hard gareth cracker  Oral Stage: mild  Mastication was mildly prolonged but adequate with the materials administered today  Bolus formation and transfer were functional with no significant oral residue noted  No overt s/sx reduced oral control  Pharyngeal Stage: [facility]  Swallowing initiation was prompt  Laryngeal rise was palpated and judged to be within functional limits  No coughing, throat clearing, change in vocal quality or respiratory status noted today  Esophageal Concerns: none reported    Risk for Aspiration:  Low    Recommendations: regular diet and thin liquids     Recommended Form of Meds: whole with liquid      Aspiration precautions and compensatory swallowing strategies: upright posture and only feed when fully alert     Results Reviewed with: patient, RN, CRNP and family      Consider referral to: ENT if vocal quality does not continue to improve    Frequency of treatment: 3-5x/wk     Patient Stated Goal: Pt confused, unable to state     Dysphagia Goals per SLP: Patient will tolerate the safest and least restrictive diet without overt s/sx aspiration x100%     Communication Goals per SLP: Patient will independently communicate wants, needs, and ideas via speech or augmentative/alternative communication x100%     Cognition Goals per SLP: Patient will independently perform organizational/problem-solving tasks within ADLs x100%     Pt/Family Education: initiated re: role of SLP in acute care, POC  potential effects of CVA on speech, language, and swallow function  Pt and caregivers would benefit from continued education   Pt's son in particular is extremely concerned re: pt's potential recovery and his recent confusion          Speech Therapy Prognosis   Prognosis: good for swallowing; deferred for speech, language, and cognition     Prognosis Considerations: medical status and cognitive status        CURRENT GAP IN FUNCTION     Prior to Admission:     Functional Status: Patient was independent with mobility/ambulation, transfers, ADL's, IADL's  Estimated length of stay: 10 to 14 days    Anticipated Post-Discharge Disposition/Treatment  Disposition: Return to previous home/apartment  Outpatient Services: Physical Therapy (PT), Occupational Therapy (OT) and Speech Therapy    BARRIERS TO DISCHARGE  Weakness, Pain, Diminished cognition/Mentation change, Fatigue, Home Accessibility, Caregiver Accessibility, Financial Resources, Equipment Needs and Resource Availability    INTERVENTIONS FOR DISCHARGE  Patient/Family/Caregiver Education, [employer], Support Group, Financial Assistance, Arrange DME needs, Home Modifcations, Medication Changes per MD recommendations, Therapy exercises, Center of balance support  and Energy conservation education     REQUIRED THERAPY:  Patient will require PT and OT 90 minutes each per day, five days per week to achieve rehab goals  REQUIRED FUNCTIONAL AND MEDICAL MANAGEMENT FOR INPATIENT REHABILITATION:  Skin:  There are no pressure sores currently, Pain Management: Overall pain is well controlled, Deep Vein Thrombosis (DVT) Prophylaxis:  aspirin, Diabetes Management: continue sliding scale insulin, patient to do finger sticks as ordered, SLIM to continue to manage diabetes, and diet, and further internal medicine management of additional medical conditions while on the ARC, PT/OT/ST intervention, patient/family education and training, and any needed consults PRN    RECOMMENDED LEVEL OF CARE:  Patient is a 62year old male who presented to the [address] [place] [address] on 7/29/18 after family noted weakness and slurring of speech  Upon presentation the patient continued with right sided weakness of the upper and lower extremities, right sided facial droop, slurring of speech and tongue deviation to the right  Imaging did confirm that the patient had sustained a left basal ganglia infarct  Prior to admission the patient was fully independent with both ADLS and IADLs  He was working and driving  The patient did not cook or clear prior to admission  At this time the patient requires min assist with both transfers and ambulation  He is able to ambulate with the use of a rolling walker and is going 240 feet  The patient was also being seen by OT  The patient scored a 17/30 on the [address] [place] [address] on 8/1/18 demonstrating moderate cognitive deficits  As of 7/31/18 the patient required mod assist with LB ADLS  At this time close medical management and PM&R management is recommended for the patient while on the ARC to help monitor labs as well as other medical conditions  Nursing management will be required to monitor bowel/bladder function to prevent incontinent episodes and skin breakdown as well as education on medication changes  Inpatient acute rehab is recommended at this time for the patient to maximize overall strength, endurance, self care, and mobility upon discharge to home with the support of his family

## 2018-08-02 NOTE — PROGRESS NOTES
Neurology Progress Note - Polina Ojeda 1960, 62 y o  male   MRN: [id number] Unit/Bed#: -01 Encounter: [id number]        Received intravenous tissue plasminogen activator (t-PA) in emergency department   Assessment & Plan    Without incident  Minimal persistent deficits r/t L subcortical CVA      * L Cerebrovascular accident (CVA) due to vascular stenosis [facility])   Assessment & Plan    DOing well overall  Still w facial weakness and mild R weakness  Started on ASA and Statin  Follow up after rehab in office      Type 2 diabetes mellitus with hyperglycemia [facility])   Assessment & Plan    Lab Results   Component Value Date    HGBA1C 7 5 (H) 07/28/2018             Subjective/Objective     Subjective:  I am really feeling better  ROS:  The the patient reports that he is some feeling of stronger on the right side  His wife reports that she feels his memory and speech is just a little bit better today compared with yesterday  Each day improved a little bit more  The remainder of his cued query was negative  Current Facility-Administered Medications:  acetaminophen 650 mg Rectal Q4H PRN   aspirin 81 mg Oral Daily   atorvastatin 80 mg Oral Daily With Dinner   carvedilol 6 25 mg Oral BID With Meals   famotidine 20 mg Oral Daily   insulin aspart protamine-insulin aspart 35 Units Subcutaneous BID AC   insulin lispro 1-6 Units Subcutaneous HS   insulin lispro 2-12 Units Subcutaneous TID AC   labetalol 10 mg Intravenous Q4H PRN       acetaminophen    labetalol    Vitals: Blood pressure 161/87, pulse 77, temperature 98 6 °F (37 °C), temperature source Oral, resp  rate 18, height 5' 8" (1 727 m), weight 112 kg (247 lb 12 8 oz), SpO2 96 %  ,Body mass index is 37 68 kg/m²  Physical Exam:     Alisson Race seen in:  In the room in the bedside chair  His wife and family at the bedside    General appearance: alert,   Neck, Lungs, Heart, & abdomen: WNL  Extremities: atraumatic, no cyanosis or edema    Neurologic:   Mental status: Alert, oriented, thought content appropriate  CN: exam EOM's I, Gaze conjugate  Non lateralizing sensory, but he has a right lateralizing motor exam, which is mild  Reminder CNVIII-XII normal    Motor:  Right power age appropriate, and improved nearly equal to the left  Sensory: grossly intact  X 4 limbs, PP not tested  Cerebellar: no ataxia or past pointing w pronation from a modified Romberg position  Finger taps age appropriate  Gait:  Fairly fluid smooth, no LOB w cadance or directional change  Encouraged to use a cane or a walker  DTR's: Age appropriate,  Plantars: downgoing L [place] R      Lab Results:   I have personally reviewed pertinent reports  , CBC:   Results from last 7 days  Lab Units 08/01/18  0515 07/31/18  0426 07/30/18  1408   WBC Thousand/uL 7 75 7 89 7 64   RBC Million/uL 4 31 4 37 4 82   HEMOGLOBIN g/dL 12 4 12 4 13 8   HEMATOCRIT % 37 7 37 3 41 6   MCV fL 88 85 86   PLATELETS Thousands/uL 146* 176 190   , BMP/CMP:   Results from last 7 days  Lab Units 08/01/18  0515 07/31/18  0426 07/30/18  1408  07/28/18  0712   SODIUM mmol/L 137 137 139  < > 137   POTASSIUM mmol/L 4 1 4 2 4 5  < > 4 5   CHLORIDE mmol/L 105 105 105  < > 105   CO2 mmol/L 27 26 30  < > 26   ANION GAP mmol/L 5 6 4  < > 6   BUN mg/dL 25 26* 23  < > 26*   CREATININE mg/dL 2 25* 2 23* 2 26*  < > 2 37*   GLUCOSE RANDOM mg/dL 154* 186* 167*  < >  --    GLUCOSE, ISTAT   --   --   --   < >  --    CALCIUM mg/dL 9 0 8 6 9 2  < > 8 8   AST U/L  --   --   --   --  18   ALT U/L  --   --   --   --  29   ALK PHOS U/L  --   --   --   --  133*   TOTAL PROTEIN g/dL  --   --   --   --  6 8   BILIRUBIN TOTAL mg/dL  --   --   --   --  0 40   EGFR ml/min/1 73sq m 31 31 31  < > 29   < > = values in this interval not displayed  , Vitamin B12:   Results from last 7 days  Lab Units 08/02/18  0550   VITAMIN B 12 pg/mL 237   , HgBA1C:   Results from last 7 days  Lab Units 07/28/18  0712   HEMOGLOBIN A1C % 7 5*   , TSH:   Results from last 7 days  Lab Units 08/02/18  0550   TSH 3RD GENERATON uIU/mL 1 946   , Coagulation:   Results from last 7 days  Lab Units 07/29/18  1215   INR  0 93   , Lipid Profile:   Results from last 7 days  Lab Units 07/29/18  1215   HDL mg/dL 33*   CHOLESTEROL mg/dL 166   LDL CALC mg/dL 64   TRIGLYCERIDES mg/dL 343*        Imaging Studies: No new neuro imaging        Counseling / Coordination of Care  Total time spent today 40 minutes  Greater than 50% of total time was spent with the patient and / or family counseling and / or coordination of care  A description of the counseling / coordination of care: All of the above was discussed with the patient his wife particularly at the bedside  She had several questions concerning diet the possibility for him returning to work follow-up care goals of rehabilitation his medications  I believe all of her questions were answered to her satisfaction at this time

## 2018-08-02 NOTE — ASSESSMENT & PLAN NOTE
DOing well overall  Still w facial weakness and mild R weakness  Started on ASA and Statin   Follow up after rehab in office

## 2018-08-03 ENCOUNTER — TRANSITIONAL CARE MANAGEMENT (OUTPATIENT)
Dept: INTERNAL MEDICINE CLINIC | Facility: CLINIC | Age: 58
End: 2018-08-03

## 2018-08-03 PROBLEM — N18.9 CKD (CHRONIC KIDNEY DISEASE): Status: ACTIVE | Noted: 2018-08-03

## 2018-08-03 PROBLEM — R41.89 COGNITIVE IMPAIRMENT: Status: ACTIVE | Noted: 2018-08-03

## 2018-08-03 PROBLEM — D69.6 THROMBOCYTOPENIA (HCC): Status: ACTIVE | Noted: 2018-08-03

## 2018-08-03 PROBLEM — R74.8 ELEVATED ALKALINE PHOSPHATASE LEVEL: Status: ACTIVE | Noted: 2018-08-03

## 2018-08-03 PROBLEM — G62.9 NEUROPATHY: Status: ACTIVE | Noted: 2018-08-03

## 2018-08-03 LAB
ANION GAP SERPL CALCULATED.3IONS-SCNC: 4 MMOL/L (ref 4–13)
BACTERIA UR QL AUTO: ABNORMAL /HPF
BILIRUB UR QL STRIP: NEGATIVE
BUN SERPL-MCNC: 26 MG/DL (ref 5–25)
CALCIUM SERPL-MCNC: 8.8 MG/DL (ref 8.3–10.1)
CHLORIDE SERPL-SCNC: 107 MMOL/L (ref 100–108)
CLARITY UR: CLEAR
CO2 SERPL-SCNC: 28 MMOL/L (ref 21–32)
COLOR UR: YELLOW
CREAT SERPL-MCNC: 2.47 MG/DL (ref 0.6–1.3)
GFR SERPL CREATININE-BSD FRML MDRD: 28 ML/MIN/1.73SQ M
GLUCOSE SERPL-MCNC: 101 MG/DL (ref 65–140)
GLUCOSE SERPL-MCNC: 102 MG/DL (ref 65–140)
GLUCOSE SERPL-MCNC: 128 MG/DL (ref 65–140)
GLUCOSE SERPL-MCNC: 130 MG/DL (ref 65–140)
GLUCOSE SERPL-MCNC: 184 MG/DL (ref 65–140)
GLUCOSE UR STRIP-MCNC: ABNORMAL MG/DL
HGB UR QL STRIP.AUTO: ABNORMAL
HYALINE CASTS #/AREA URNS LPF: ABNORMAL /LPF
KETONES UR STRIP-MCNC: NEGATIVE MG/DL
LEUKOCYTE ESTERASE UR QL STRIP: NEGATIVE
NITRITE UR QL STRIP: NEGATIVE
NON-SQ EPI CELLS URNS QL MICRO: ABNORMAL /HPF
PH UR STRIP.AUTO: 6 [PH] (ref 4.5–8)
POTASSIUM SERPL-SCNC: 4.5 MMOL/L (ref 3.5–5.3)
PROT UR STRIP-MCNC: ABNORMAL MG/DL
RBC #/AREA URNS AUTO: ABNORMAL /HPF
SODIUM SERPL-SCNC: 139 MMOL/L (ref 136–145)
SP GR UR STRIP.AUTO: 1.02 (ref 1–1.03)
UROBILINOGEN UR QL STRIP.AUTO: 1 E.U./DL
WBC #/AREA URNS AUTO: ABNORMAL /HPF

## 2018-08-03 PROCEDURE — 97167 OT EVAL HIGH COMPLEX 60 MIN: CPT

## 2018-08-03 PROCEDURE — 97127 HB COGNITIVE SKILLS DEVELOPMENT, EACH 15 MUNUTES: CPT

## 2018-08-03 PROCEDURE — 80048 BASIC METABOLIC PNL TOTAL CA: CPT | Performed by: NURSE PRACTITIONER

## 2018-08-03 PROCEDURE — 82948 REAGENT STRIP/BLOOD GLUCOSE: CPT

## 2018-08-03 PROCEDURE — 97530 THERAPEUTIC ACTIVITIES: CPT | Performed by: PHYSICAL THERAPIST

## 2018-08-03 PROCEDURE — 81001 URINALYSIS AUTO W/SCOPE: CPT | Performed by: INTERNAL MEDICINE

## 2018-08-03 PROCEDURE — 99254 IP/OBS CNSLTJ NEW/EST MOD 60: CPT | Performed by: INTERNAL MEDICINE

## 2018-08-03 PROCEDURE — 97116 GAIT TRAINING THERAPY: CPT | Performed by: PHYSICAL THERAPIST

## 2018-08-03 PROCEDURE — 97163 PT EVAL HIGH COMPLEX 45 MIN: CPT | Performed by: PHYSICAL THERAPIST

## 2018-08-03 PROCEDURE — 99223 1ST HOSP IP/OBS HIGH 75: CPT | Performed by: PHYSICAL MEDICINE & REHABILITATION

## 2018-08-03 PROCEDURE — 97535 SELF CARE MNGMENT TRAINING: CPT

## 2018-08-03 PROCEDURE — 92523 SPEECH SOUND LANG COMPREHEN: CPT

## 2018-08-03 PROCEDURE — G0515 COGNITIVE SKILLS DEVELOPMENT: HCPCS

## 2018-08-03 RX ORDER — GABAPENTIN 300 MG/1
600 CAPSULE ORAL DAILY
Status: DISCONTINUED | OUTPATIENT
Start: 2018-08-04 | End: 2018-08-09

## 2018-08-03 RX ADMIN — INSULIN LISPRO 1 UNITS: 100 INJECTION, SOLUTION INTRAVENOUS; SUBCUTANEOUS at 11:14

## 2018-08-03 RX ADMIN — ASPIRIN 81 MG: 81 TABLET, COATED ORAL at 10:00

## 2018-08-03 RX ADMIN — INSULIN ASPART 35 UNITS: 100 INJECTION, SUSPENSION SUBCUTANEOUS at 09:00

## 2018-08-03 RX ADMIN — ATORVASTATIN CALCIUM 80 MG: 80 TABLET, FILM COATED ORAL at 16:40

## 2018-08-03 RX ADMIN — CARVEDILOL 6.25 MG: 6.25 TABLET, FILM COATED ORAL at 16:41

## 2018-08-03 RX ADMIN — CARVEDILOL 6.25 MG: 6.25 TABLET, FILM COATED ORAL at 08:00

## 2018-08-03 RX ADMIN — FAMOTIDINE 20 MG: 20 TABLET ORAL at 10:00

## 2018-08-03 RX ADMIN — INSULIN ASPART 35 UNITS: 100 INJECTION, SUSPENSION SUBCUTANEOUS at 16:40

## 2018-08-03 NOTE — PROGRESS NOTES
Correction: (If pt is not home)  Please have pt call our office back once returns home to f/u and schedule appt

## 2018-08-03 NOTE — ASSESSMENT & PLAN NOTE
Per family present x 1 year, OP FU [facility] for further testing/management (appt requested)

## 2018-08-03 NOTE — TREATMENT PLAN
Individualized Plan of [address] [place] [address] 62 y o  male MRN: [id number]  Unit/Bed#: -06 Encounter: [id number]     PATIENT INFORMATION  ADMISSION DATE: 8/2/2018  2:39 PM CRYSTAL CATEGORY: cva   ADMISSION DIAGNOSIS: Stroke (cerebrum) ([facility] [address] ) [I63 9]  EXPECTED LOS: 1-2 wks     MEDICAL/FUNCTIONAL PROGNOSIS  Based on my assessment of the patient's medical conditions and current functional status, the prognosis for attaining medical and functional goals or the IRF stay is:  Fair     Medical Goals: risk factor modification    ANTICIPATED DISCHARGE DISPOSITION AND SERVICES  COMMUNITY SETTING: home       ANTICIPATED FOLLOW-UP SERVICE:   Outpatient Therapy Services: PT/OT/Speech    DISCIPLINE SPECIFIC PLANS:  Required Disciplines & Services: PT/OT/Speech     REQUIRED THERAPY:  Therapy Hours per Day Days per Week Total Days   Physical Therapy 1 5 10   Occupational Therapy 1 5 10   Speech/Language Therapy 1 5 10   NOTE: Additional therapy time(s) may be added as appropriate to meet patient needs and to achieve functional goals        ANTICIPATED FUNCTIONAL OUTCOMES:  ADL: Patient will be independent with ADLs with least restrictive device upon completion of the rehab program   Bladder/Bowel:   Patient will be independent with bladder/bowel management with least restrictive device upon completion of the rehab program   Transfers:   Patient will be independent with tranfers with least restrictive device upon completion of the rehab program   Locomotion:   Patient will be independent with locomotion with least restrictive device upon completion of the rehab program   Cognitive:   Patient will be independent/supervision with simple cognitive tasks with least restrictive device upon completion of the rehab program     DISCHARGE PLANNING NEEDS  Equipment needs: tbd       REHAB ANTICIPATED PARTICIPATION RESTRICTIONS:  None

## 2018-08-03 NOTE — ASSESSMENT & PLAN NOTE
· On home ASA 81 mg qd  · Home lovastatin 20 mg qpm changed to lipitor 80 mg qpm per neuro  · [facility], appt requested

## 2018-08-03 NOTE — PROGRESS NOTES
SLP TAA     08/03/18 1030   Patient Data   Rehab Impairment Stroke   Etiologic Diagnosis Left Basal Ganglia Infarct   Date of Onset 07/29/18   Prior IADL Participation   Money Management Identify Money;Estimate Costs;Estimate Change;Combine Bills;Manage Checkbook  (as per pt report)   Meal Preparation Full Participation  (as per pt report)   Laundry Full Participation  (as per pt report)   Home Cleaning Full Participation  (as per pt report)   Prior Level of Function   Functional Cognition 3  Independent - Patient completed the activities by him/herself, with or without an assistive device, with no assistance from a helper  Restrictions/Precautions   Precautions Cognitive;Bed/chair alarms; Fall Risk;Impulsive;Supervision on toilet/commode   Weight Bearing Restrictions No   ROM Restrictions No   Pain Assessment   Pain Assessment No/denies pain   Pain Score No Pain   Comprehension   Assist Devices Glasses   Auditory Basic   Visual Basic   Findings Pt completed formalized cognitive linguistic assessment  See SLP Rehab note for full details  QI: Comprehension 3  Usually Understands: Understands most conversations, but misses some part/intent of message  Requires cues at times to understand  Comprehension (FIM) 3 - Understands basic info/conversation 50-74% of time   Expression   Verbal Basic   Non-Verbal Basic   Intelligibility Sentence   Findings Pt completed formalized cognitive linguistic assessment  Pt presenting w/ word finding deficits, use of semantic paraphasias, and use oc circumlocution  Expressive language skills to be further assessed  See SLP Rehab note for full details  QI: Expression 2   Frequently exhibits difficulty with expressing needs and ideas   Expression (FIM) 3 - Needs to repeat parts of sentences   Social Interaction   Cooperation with staff   Participation Individual   Behaviors observed Appropriate   Findings Pt was cooperative and participatory throughout session, however, did require consistent verbal redirections to tasks  Social Interaction (FIM) 5 - Interacts appropriately with others 90% of time   Problem Solving   Findings Pt completed formalized cognitive linguistic assessment  See SLP Rehab note for full details  Problem solving (FIM) 2 - Needs direction more than ½ time to initiate, plan or complete simple tasks   Memory   Remember Routine No   Initiates Tasks No   Short-Term Impaired   Long Term Impaired   Recalls Precaution No   Findings Pt completed formalized cognitive linguistic assessment  See SLP Rehab note for full details  Memory (FIM) 2 - Recognizes, recalls/performs 25-49%   Cognition   Overall Cognitive Status Impaired   Arousal/Participation Alert; Cooperative   Attention Difficulty attending to directions   Orientation Level Oriented to person;Disoriented to place; Disoriented to time;Disoriented to situation   Memory Decreased long term memory;Decreased recall of biographical information;Decreased short term memory;Decreased recall of recent events;Decreased recall of precautions   Following Commands Follows one step commands with increased time or repetition   Comments Pt completed formalized cognitive linguistic assessment  See SLP Rehab note for full details  Discharge Information   Vocational Plan Return to work   Barriers to Return to [employer] Access;Skill Set Limitation;Transportation Issues; Communication; Other;Strength; Endurance  (cognition)   Patient's Discharge Plan To return home with family support   Patient's Rehab Expectations To get better and go home   Barriers to Discharge Home Decreased Cognitive Function;Decreased Strength;Decreased Endurance; Safety Considerations   Impressions Pt completed formalized cognitive linguistic assessment where pt found to be presenting w/ severe cognitive linguistic impairment characterized by deficits in attention, memory, executive functions, language and visuospatial skills   Also suspecting expressive and receptive language components to pt's current deficits  During informal convo, pt presenting w/ word finding deficits, as well as use of semantic paraphasias and circumlocution  Pt presents w/ good rehab potential and will benefit from skilled ST intervention to maximize functional communication and cognitive linguistic skills at this time      SLP Therapy Minutes   SLP Time In 1030   SLP Time Out 1115   SLP Total Time (minutes) 45   SLP Mode of treatment - Individual (minutes) 45   SLP Mode of treatment - Concurrent (minutes) 0   SLP Mode of treatment - Group (minutes) 0   SLP Mode of treatment - Co-treat (minutes) 0   SLP Mode of Teatment - Total time(minutes) 45 minutes

## 2018-08-03 NOTE — ASSESSMENT & PLAN NOTE
· New baseline Cr appears to be 2 2-2 4, Cr currently stable at 2 2, renal service managing as inpt, OP FU with Dr Eduardo Naik as PTA   · Renal ordered the following tests as inpt:  · SPEP/UPEP: pending, d/w Dr Kim Kramer of renal and this should not hold up dc as pt can FU with Dr Eduardo Naik who will check results and he will manage as OP   · SOLEDAD/ANCA:  pending, d/w Dr Kim Kramer of renal and this should not hold up dc as pt can FU with Dr Eduardo aNik who will check results and he will manage as OP    · UPCR: elevated, renal aware  · C3/C4 compliment: wnl, renal aware  · Immunoglobulin Lt chains: Ig Kappa, Ig Yadi, Kappa/Yadi ratio all elevated, d/w Dr Kim Kramer of renal and felt that this may be simply from decreased clearance due to patient's CKD however the SPEP/UPEP which is pending will be more confirmatory  · Per Dr Kim Kramer cleared for dc from renal standpoint  · D/W Dr Kim Kramer regarding low K+ diet and as patient is not yet on ACE per Dr Kim Kramer patient may be dc'd without K+ restriction  · Scrip provided upon dc for BMP (with eGFR/GFR) with results to Dr Eduardo Naik (per Dr Kim Kramer ordering phos and PTH levels can be deferred to Dr Eduardo Naik)

## 2018-08-03 NOTE — H&P
PHYSICAL MEDICINE AND REHABILITATION H&P/ADMISSION NOTE  Stephania Ortiz 62 y o  male MRN: [id number]  Unit/Bed#: -86 Encounter: [id number]     Rehab Diagnosis: Stroke:  01 4  No paresis    History of Present Illness:   Stephania Ortiz is a 62 y o  male who presented to the [facility] with Rt sided weakness, Rt facial droop, and slurred speech  Patient was given TPA and responded well and does not currently have paresis, slurred speech, or facial droop  Subjective:  Patient without complaint currently, d/w patient barrier to DC     Review of Systems: A 10-point review of systems was performed  Negative except as listed above  Plan:      Thrombocytopenia (HCC)   Assessment & Plan    Mild at 146 ()         CKD (chronic kidney disease)   Assessment & Plan    Baseline Cr appears to be 2 0-2 25, renal managing (follows with Dr Lesley Otero as OP)         Elevated alkaline phosphatase level   Assessment & Plan    Chronically elevated going back 1 year, currently mildly elevated at 133 ()         Cognitive impairment   Assessment & Plan    Per family present x 1 year, OP [facility] for further testing/management         Neuropathy   Assessment & Plan    Likely 2/2 to DM, on home neurontin 600 qd        Essential hypertension   Assessment & Plan    Home coreg increased from 3 125 to 6 25 mg BID  Home norvasc 5 mg qd currently on hold        Hyperlipidemia   Assessment & Plan    Home lovastatin 20 mg qpm changed to lipitor 80 mg qpm per neuro for CVA        Type 2 diabetes mellitus with hyperglycemia ([facility] )   Assessment & Plan    On home novolog 70/30 35 units BID        * Ischemic cerebrovascular accident (CVA) ([facility] )   Assessment & Plan    On home ASA 81 mg qd  Home lovastatin 20 mg qpm changed to lipitor 80 mg qpm per neuro           Scheduled Meds:  Current Facility-Administered Medications:  acetaminophen 650 mg Oral Q6H PRN Brittnee Kenyon MD   aspirin 81 mg Oral Daily Thelma Burnett MD   atorvastatin 80 mg Oral Daily With Courtney Rincon MD   carvedilol 6 25 mg Oral BID With Meals Thelma Burnett MD   famotidine 20 mg Oral Daily MD Dipika Godoy ON 8/4/2018] gabapentin 600 mg Oral Daily Thelma Burnett MD   insulin aspart protamine-insulin aspart 35 Units Subcutaneous BID AC Thelma Burnett MD   insulin lispro 1-5 Units Subcutaneous TID AC Thelma Burnett MD   insulin lispro 1-5 Units Subcutaneous HS Thelma Burnett MD   polyethylene glycol 17 g Oral Daily PRN Thelma Burnett MD     Continuous Infusions:      DVT ppx: SCDs b/l, patient ambulating sufficient distances    Will defer to PCP with further testing/treatment and/or specialist referral at PCP's discretion     Incidental findings noted on CT A/P 8/2016:  1) Rt renal cysts  2) cirrhosis/portal hypertension  3) splenomegaly    Other incidental findings:  1) grade I DD  2) Rt carotid and B/L vertebral artery stenosis        Functional History - Prior to Admission:    I PTA     Functional History - Currently:     Min amb/tx, sup ADLs     Social Hx:    Patient lives with family, FF setup available, 3 RAFAT     Physical Exam:  Vitals:    08/03/18 1353   BP: 133/76   Pulse: 75   Resp: 20   Temp: 98 3 °F (36 8 °C)   SpO2: 95%       General: alert, no apparent distress, cooperative and comfortable  HEENT:  Head: Normocephalic, no lesions, without obvious abnormality    CARDIAC:  +S1/2   LUNGS:  respirations unlabored   ABDOMEN:  soft NT   EXTREMITIES:  no signifiant LE edema B/L   NEURO:   Lt ptosis otherwise CN grossly intact, lt touch grossly intact, B/L F-N testing w/o gross dysmetria, 4+/5 throughout on MMT  PSYCH:  mood/affect stable       Laboratory:      Results from last 7 days  Lab Units 08/01/18  0515 07/31/18  0426 07/30/18  1408   HEMOGLOBIN g/dL 12 4 12 4 13 8   HEMATOCRIT % 37 7 37 3 41 6   WBC Thousand/uL 7 75 7 89 7 64       Results from last 7 days  Lab Units 08/03/18  0625 08/01/18  0515 07/31/18  9313 07/28/18  0712   BUN mg/dL 26* 25 26*  < > 26*   SODIUM mmol/L 139 137 137  < > 137   POTASSIUM mmol/L 4 5 4 1 4 2  < > 4 5   CHLORIDE mmol/L 107 105 105  < > 105   GLUCOSE RANDOM mg/dL 102 154* 186*  < >  --    GLUCOSE, ISTAT   --   --   --   < >  --    CREATININE mg/dL 2 47* 2 25* 2 23*  < > 2 37*   AST U/L  --   --   --   --  18   ALT U/L  --   --   --   --  29   < > = values in this interval not displayed  Results from last 7 days  Lab Units 07/29/18  1215   PROTIME seconds 12 2   INR  0 93            Patient will receive PT, OT and ST 60 minutes each per day, five days per week to achieve rehab goals  Past Medical History:   Past Surgical History:   Family History:   Social history:   Past Medical History:   Diagnosis Date    [facility]     Hypercholesteremia     Hyperlipidemia     Hypertension     Neuropathy     Obesity     Osteomyelitis [facility] )     last assessed 11/4/16    PVC's (premature ventricular contractions)     sees cardiology Dr Jayy camargo    Past Surgical History:   Procedure Laterality Date    ABDOMINAL SURGERY      CHOLECYSTECTOMY      Percutaneous    OTHER SURGICAL HISTORY      "stimulator to control bowel movements"    KS ESOPHAGOGASTRODUODENOSCOPY TRANSORAL DIAGNOSTIC N/A 9/27/2016    Procedure: ESOPHAGOGASTRODUODENOSCOPY (EGD); Surgeon: Sherrill Jerome MD;  [facility];   Service: Gastroenterology    KS LAP,CHOLECYSTECTOMY N/A 2/29/2016    Procedure: LAPAROSCOPIC CHOLECYSTECTOMY ;  Surgeon: Floyd Hunter DO;  [facility];  Service: General    ROTATOR CUFF REPAIR Right     TOE AMPUTATION Right 10/28/2016    Procedure: 3RD TOE AMPUTATION ;  Surgeon: Dhruv Levin DPM;  [facility];  Service:      Family History   Problem Relation Age of Onset    Leukemia Mother     Liver disease Mother     Lung cancer Mother         heavy smoker - 3 ppd    Heart disease Father     Liver disease Father     Multiple myeloma Sister  Breast cancer Sister     Urolithiasis Family     Alcohol abuse Neg Hx     Depression Neg Hx     Drug abuse Neg Hx     Substance Abuse Neg Hx       Social History     Social History    Marital status: /Civil Union     Spouse name: N/A    Number of children: N/A    Years of education: N/A     Occupational History    currently working      Social History Main Topics    Smoking status: Never Smoker    Smokeless tobacco: Never Used    Alcohol use No    Drug use: No    Sexual activity: Not Asked     Other Topics Concern    None     Social History Narrative    Daily caffeine consumption 2-3 servings a day              Current Medical Diagnosis Medications Allergies   Patient Active Problem List   Diagnosis    Type 2 diabetes mellitus with hyperglycemia ([facility] )    Hyperlipidemia    Essential hypertension    Ischemic cerebrovascular accident (CVA) ([facility] )    Neuropathy    Cognitive impairment    Elevated alkaline phosphatase level    CKD (chronic kidney disease)    Thrombocytopenia (HCC)      Current Facility-Administered Medications:     acetaminophen (TYLENOL) tablet 650 mg, 650 mg, Oral, Q6H PRN, Jessica Fang MD    aspirin (ECOTRIN LOW STRENGTH) EC tablet 81 mg, 81 mg, Oral, Daily, Jessica Fang MD, 81 mg at 08/03/18 1000    atorvastatin (LIPITOR) tablet 80 mg, 80 mg, Oral, Daily With Jaqueline Osman MD, 80 mg at 08/02/18 1631    carvedilol (COREG) tablet 6 25 mg, 6 25 mg, Oral, BID With Meals, Jessica Fang MD, 6 25 mg at 08/03/18 0800    famotidine (PEPCID) tablet 20 mg, 20 mg, Oral, Daily, Jessica Fang MD, 20 mg at 08/03/18 1000    [START ON 8/4/2018] gabapentin (NEURONTIN) capsule 600 mg, 600 mg, Oral, Daily, Jessica Fang MD    insulin aspart protamine-insulin aspart (NovoLOG 70/30) 100 units/mL subcutaneous injection 35 Units, 35 Units, Subcutaneous, BID AC, Jessica Fang MD, 35 Units at 08/03/18 0900    insulin lispro (HumaLOG) 100 units/mL subcutaneous injection 1-5 Units, 1-5 Units, Subcutaneous, TID AC, 1 Units at 08/03/18 1114 **AND** Fingerstick Glucose (POCT), , , TID AC, Anamika Braswell MD    insulin lispro (HumaLOG) 100 units/mL subcutaneous injection 1-5 Units, 1-5 Units, Subcutaneous, HS, Anamika Braswell MD    polyethylene glycol (MIRALAX) packet 17 g, 17 g, Oral, Daily PRN, Anamika Braswell MD No Known Allergies        Medical Necessity Criteria for [facility] Admission: thrombocytopenia, CKD, HTN   In addition, the preadmission screen, post-admission physical evaluation, overall plan of care and admissions order demonstrate a reasonable expectation that the following criteria were met at the time of admission to the [facility]  1  The patient requires active and ongoing therapeutic intervention of multiple therapy disciplines (physical therapy, occupational therapy, speech-language pathology, or prosthetics/orthotics), one of which is physical or occupational therapy  2  Patient requires an intensive rehabilitation therapy program, as defined in Chapter 1, section 110 2 2 of the CMS Medicare Policy Manual  This intensive rehabilitation therapy program will consist of at least 3 hours of therapy per day at least 5 days per week or at least 15 hours of intensive rehabilitation therapy within a 7 consecutive day period, beginning with the date of admission to the [facility]  3  The patient is reasonably expected to actively participate in, and benefit significantly from, the intensive rehabilitation therapy program as defined in Chapter 1, section 110 2 2 of the CMS Medicare Policy Manual at this time of admission to the [facility]  He can reasonably be expected to make measurable improvement (that will be of practical value to improve the patients functional capacity or adaptation to impairments) as a result of the rehabilitation treatment, as defined in section 110 3, and such improvement can be expected to be made within the prescribed period of time   As noted in the CMS Medicare Policy Manual, the patient need not be expected to achieve complete independence in the domain of self-care nor be expected to return to his or her prior level of functioning in order to meet this standard  4  The patient must require physician supervision by a rehabilitation physician  As such, a rehabilitation physician will conduct face-to-face visits with the patient at least 3 days per week throughout the patients stay in the [facility] to assess the patient both medically and functionally, as well as to modify the course of treatment as needed to maximize the patients capacity to benefit from the rehabilitation process  5  The patient requires an intensive and coordinated interdisciplinary approach to providing rehabilitation, as defined in Chapter 1, section 110 2 5 of the CMS Medicare Policy Manual  This will be achieved through periodic team conferences, conducted at least once in a 7-day period, and comprising of an interdisciplinary team of medical professionals consisting of: a rehabilitation physician, registered nurse,  and/or , and a licensed/certified therapist from each therapy discipline involved in treating the patient  Changes Since Pre-admission Assessment: None -This patient's participation in rehab continues to be reasonable, necessary and appropriate  CMS Required Post-Admission Physician Evaluation Elements  History and Physical, including medical history, functional history and active comorbidities as in above text      PostAdmission Physician Evaluation:  The patient has the potential to make improvement and is in need of physical, occupational, and/or therapy services  The patient may also need nutritional services  Given the patient's complex medical condition and risk of further medical complications, rehabilitative services cannot be safely provided at a lower level of care, such as a skilled nursing facility   I have reviewed the patient's functional and medical status at the time of the preadmission screening and they are the same as on the day of this admission  I acknowledge that I have personally performed a full physical examination on this patient within 24 hours of admission   The patient and/or family demonstrated understanding the rehabilitation program and the discharge process after we discussed them      Agree in entirety: yes  Minor adaptions: none    Major changes: none     Brittnee Kenyon MD  Physical Medicine and Rehabilitation

## 2018-08-03 NOTE — PROGRESS NOTES
08/03/18 0700   Patient Data   Rehab Impairment Impairment of mobility, safety, Activities of Daily Living (ADLs), and cognitive/communication skills due to Stroke:  01 2  Right Body Involvement (Left Brain)    Etiologic Diagnosis left basal ganglia infarct   Date of Onset 07/29/18   Home Setup   Type of Home Multi Level   Method of Entry Stairs   Number of Stairs 3   Number of Stairs in Home (full flight)   First Floor Bathroom Full; Shower   Second [place] Full   First Floor Setup Available Yes   Baseline Information   Vocation Work Full Time   Transportation    Prior IADL Participation   Money Management Identify Money;Estimate Costs;Estimate Change;Combine Bills;Manage Checkbook   Meal Preparation Full Participation   Laundry Full Participation   Home Cleaning Full Participation   Prior Level of Function   Self-Care 3  Independent - Patient completed the activities by him/herself, with or without an assistive device, with no assistance from a helper  Indoor-Mobility (Ambulation) 3  Independent - Patient completed the activities by him/herself, with or without an assistive device, with no assistance from a helper  Stairs 3  Independent - Patient completed the activities by him/herself, with or without an assistive device, with no assistance from a helper  Functional Cognition 3  Independent - Patient completed the activities by him/herself, with or without an assistive device, with no assistance from a helper  Patient Preference   Nickname (Patient Preference) Wilbur Vasquez   Patient Normally Wakes at 0800   Psychosocial   Psychosocial (WDL) WDL   Restrictions/Precautions   Precautions Bed/chair alarms;Cognitive; Fall Risk;Impulsive   Weight Bearing Restrictions No   ROM Restrictions No   Pain Assessment   Pain Assessment No/denies pain   Pain Score No Pain   QI: Eating   Assistance Needed Set-up / clean-up   Assistance Provided by [place] No physical assistance   Eating CARE Score 5 Eating Assessment   Findings Pt impulsive w/ setting up tray  Pt put chunk of butter on bread and put whole piece in mouth w/o spreading  Pt questions each item on tray  Pt asks "this is butter?" when confirmed he continues to attempt to put in coffee, and reports "No I dont want butter in there "   Eating (FIM) 5 - Patient needs help to open contianers or set up tray   QI: Oral Hygiene   Reason if not Attempted Activity not applicable   Oral Hygiene CARE Score 9   Grooming   Able To Comb/Brush Hair;Wash/Dry Face;Wash/Dry Hands   Limitation Noted In Coordination; Safety;Problem Solving   Findings Pt completed grooming in seated position at the sink  Pt required VC for sequencing  Grooming (FIM) 5 - Patient requires supervision/monitoring   QI: Shower/Bathe Self   Assistance Needed Verbal cues   Assistance Provided by [place] No physical assistance   Shower/Bathe Self CARE Score 4   Bathing   Assessed Bath Style Sponge Bath   Anticipated D/C Bath Style Shower   Able to [place] No   Able to [employer] Temperature Yes   Able to Wash/Rinse/Dry (body part) Left Arm;Right Arm;L Upper Leg;R Upper Leg;L Lower Leg/Foot;R Lower Leg/Foot;Chest;Abdomen;Perineal Area; Buttocks   Limitations Noted in Balance; Coordination; Endurance;Problem Solving; Safety   Positioning Seated;Standing   Findings  Pt completed bathing in a seated position at the sink  Pt required cueing for sequencing and termination  Pt reports that he was done after washing chest and arms  OT provided VC for washing 10/10 areas and provided CGA while pt in stance  Pt unsteady w/ dynamic reaching in stance      Bathing (FIM) 4 - Patient requires steadying assist or light touching   QI: Upper Body Dressing   Assistance Needed Supervision   Assistance Provided by [place] No physical assistance   Upper Body Dressing CARE Score 4   QI: Lower Body Dressing   Assistance Needed Supervision   Assistance Provided by [place] No physical assistance   Lower Body Dressing CARE Score 4   QI: Putting On/Taking Off [address] [place] [address] Provided by [place] No physical assistance   Putting On/Taking Off Footwear CARE Score 4   QI: Picking Up Object   Reason if not Attempted Activity not applicable   Picking Up Object CARE Score 9   Dressing/Undressing Clothing   Remove UB Clothes (gown)   Remove LB Clothes Pants;Socks   Don UB Clothes (gown)   Don LB Clothes Pants;Socks   Limitations Noted In Balance; Coordination; Endurance; Safety   Positioning Supported Sit;Standing   Findings Pt able to dress self w/ VC for sequencing  Pt able to orient clothing and don w/o physical assistance  Pt required steadying assist while in stance when donning pants  Pt was able to don socks by crossing LE over knee and maintain a balanced position  UB Dressing (FIM) 5 - Patient requires supervision/monitoring   LB Dressing (FIM) 4 - Patient requires steadying assist or light touching   QI: [address] Needed Incidental touching   [address] Score 4   Toileting   Able to [address] down yes, up yes  Manage Hygiene Bladder   Limitations Noted In Balance; Coordination;Problem Solving; Sequencing; Safety   Findings Pt completed toileting in stance  Pt required CGA to assist in balance w/o UE support      Toileting (FIM) 4 - Patient requires steadying assist or light touching   QI: Roll Left and Right   Assistance Needed Supervision   Assistance Provided by [place] No physical assistance   Roll Left and Right CARE Score 4   Bed Mobility   Able to Roll Left to Right;Right to Left;Scoot Up;Scoot Down   QI: Lying to Sitting on Side of Bed   Assistance Needed Supervision   Assistance Provided by [place] No physical assistance   Lying to Sitting on Side of Bed CARE Score 4   QI: Sit to Stand   Assistance Needed Incidental touching   Sit to Stand CARE Score 4   QI: Chair/Bed-to-Chair Transfer   Assistance Needed Incidental touching Chair/Bed-to-Chair Transfer CARE Score 4   Transfer Bed/Chair/Wheelchair   Positioning Concerns Cognition   Limitations Noted In Balance; Coordination; Endurance;Problem Solving  (impulsivity)   Adaptive Equipment Roller Walker   Sit to Stand Minimal   Stand to Sit Supervision   Supine to Sit Supervision   Findings Pt impulsive and requires CGA for balance while in stance  posterior weight shift at imes, stands on heels, has wide JOSE DAVID  Decreased insight into safety despite RW recommendations   Bed, Chair, Wheelchair Transfer (FIM) 4 - Patient requires steadying assist or light touching   QI: Toilet Transfer   Assistance Needed Incidental touching   Toilet Transfer CARE Score 4   Toilet Transfer   Surface Assessed Standard Toilet   Transfer Technique Standard   Limitations Noted In Balance; Endurance;Problem Solving; Safety   Toilet Transfer (FIM) 4 - Patient requires steadying assist or light touching   Tub/Shower Transfer   Not Assessed Patient refusal   Tub Transfer (FIM) 0 - Activity does not occur   Shower Transfer (FIM) 0 - Activity does not occur   Comprehension   QI: Comprehension 3  Usually Understands: Understands most conversations, but misses some part/intent of message  Requires cues at times to understand  Comprehension (FIM) 5 - Understands basic directions and conversation   Expression   QI: Expression 3  Exhibits some difficulty with expressing needs and ideas (e g , some words or finishing thoughts) or speech is not clear   Expression (FIM) 5 - Needs help/cues only RARELY (< 10% of the time)   Social Interaction   Social Interaction (FIM) 5 - Interacts appropriately with others 90% of time   Problem Solving   Problem solving (FIM) 4 - Solves basic problems 75-89% of time   Memory   Initiates Tasks No   Short-Term Impaired   Long Term Impaired   Recalls Precaution No   Findings After intial attempt, pt informed of the date  5min later pt was unable to recall date   Pt informed again, and was unable to recall 5min later  Memory (FIM) 2 - Recalls 1 of 2 steps   RUE Assessment   RUE Assessment WFL  (slightly weaker than LUE)   LUE Assessment   LUE Assessment WFL   Coordination   Movements are Fluid and Coordinated 0   Coordination and Movement Description ataxic and uncoordinated   Sensation   Light Touch No apparent deficits   Cognition   Overall Cognitive Status Impaired   Arousal/Participation Alert; Cooperative   Attention Attends with cues to redirect   Orientation Level Oriented to person;Disoriented to place; Disoriented to time;Disoriented to situation   Memory Decreased short term memory;Decreased long term memory;Decreased recall of biographical information;Decreased recall of precautions;Decreased recall of recent events   Following Commands Follows one step commands with increased time or repetition   Comments Pt impulsive and demo poor insight to deficits, reporting "I am doing well, I don't know why I am here"  Pt given MOCA, scoring an 11/30 (MOD cognitive impairment)  See notes for further details  Vision   Vision Comments Pt reports wearing glasses to see but refuses to wear them during session  Pt oculomotor coordination deficits and fixates on different objects in the room when scanning  Pt's L eye ptosis  Pt completed line canceling worksheet and was able to cross each line out demo no visual field scanning deficits  Pt also completed letter cross out worksheet and demo inconsistent findings of letters, pt made 14 mistakes on this exercise  Perception   Inattention/Neglect Appears intact   Body Scheme Appears intact   Objective Measure   OT Measure(s) Pt completed the MOCA version 8 1  Pt scored 1/5 for visuospatial/exective, 1/3 for naming, 4/6 for attention, 2/3 for language (pt able to name 8 words in one minute), 1/2 for abstraction, 0/5 for delayed recall memory, and 2/6 for orientation  Pt overall scoring a 11/30 demo a MOD cognitive impairment      Discharge Information   Impressions Pt presented to [facility] on 7/[facility]18 with Impairment of mobility, safety, Activities of Daily Living (ADLs), and cognitive/communication skills due to Stroke:  01 2  Right Body Involvement (Left Brain) and left basal ganglia infarct  PTA pt was functioning at an independent level with ADLs and IADLs, was working full time as a , and lives in a multi level house with his wife and four children  Pt is currently functioning at a CGA level for balance and safety with completion of bathing, grooming, dressing, and toileting  Pt is at a sup level for eating  Pt demo MOD cognitive deficit level while testing from the [facility]  Pt deficits include attention, balance, coordination, insight to deficits, and cognition  Pt would benefit from skilled OT services to increase independence and safety in ADLs and IADLs  PT ELOS is 7-10 days at an independent/supervision level     OT Therapy Minutes   OT Time In 0700   OT Time Out 0830   OT Total Time (minutes) 90   OT Mode of treatment - Individual (minutes) 90   OT Mode of treatment - Concurrent (minutes) 0   OT Mode of treatment - Group (minutes) 0   OT Mode of treatment - Co-treat (minutes) 0   OT Mode of Teatment - Total time(minutes) 90 minutes

## 2018-08-03 NOTE — CONSULTS
Consultation - Nephrology   Daniel Vaughn 62 y o  male MRN: [id number]  Unit/Bed#: -01 Encounter: [id number]    ASSESSMENT and PLAN:    Chronic kidney disease, stage III  - Baseline Cr apparently 1 9-2 2, has risen to 2 47 today  - Pt known to be not fully compliant with outpatient regimen for diabetes  - Likely secondary to diabetic nephropathy, hypertension  - Acute rise in Cr could be related to contrast administration for CTA on 7/29, though this would be somewhat late for contrast nephropathy  - Will obtain UA - pt has had microhematuria and proteinuria in the past  - Not currently monitoring I/O but patient reports good UOP; can begin monitoring this if Cr continues to worsen  - Recheck BMP tomorrow AM  - Continue to avoid nephrotoxic agents    Diabetes, type II  - A1C 7 5, poorly controlled  - On insulin at home but not fully compliant  - Continue current regimen of basal bolus and sliding scale    Hypertension  - Blood pressure control improving with recent increase in carvedilol to 6 25 BID  - Can add amlodipine 5mg BID if needed  - Can add lisinopril when Cr stabilizes - had been discontinued last year      HISTORY OF PRESENT ILLNESS:  Requesting Physician: Colonel Micaela MD  Reason for Consult: CKD with rising serum creatinine    Daniel Vaughn is a 62y o  year old male with a history of CKD, uncontrolled type 2 diabetes (A1C 7 5), HTN, HLD who was admitted to [facility] for rehab for CVA s/p tPA  A renal consultation is requested today for assistance in the management of his stage 3 CKD, baseline creatinine around 1 9-2 2 which today has increased to 2 47  History obtained from patient, patient's wife, and chart  Mr Amaris Lees was admitted to [facility] after witnessed dysarthria and R sided facial droop  He received tPA and was admitted for monitoring on 7/29  He was admitted to rehab here at [facility] on 8/2 with an anticipated 1-2 week stay for rehabilitation before returning to home   During his hospitalization, he was given basal bolus and sliding scale insulin  His blood pressure was maintained on Coreg  He was started on atorvastatin and ASA 81 for his CVA  For his kidney disease, he follows with Dr Garland Sapp, whom he last saw on 3/28/18  His CKD is thought to be related to diabetic nephropathy and hypertension  Today, his only complaint is that he is tired from PT this morning, and some stomach pains that he had last night that have resolved  Patient's wife states that he has not used Motrin, Advil, or Aleve; only uses aspirin for pain control  They deny hematuria or dark urine, but wife says that he does have bubbles in urine normally  He states that he is making urine normally  He denies appetite change  Denies leg swelling  PAST MEDICAL HISTORY:  Past Medical History:   Diagnosis Date    Diabetes mellitus [facility] [address] )     Hypercholesteremia     Hyperlipidemia     Hypertension     Neuropathy     Obesity     Osteomyelitis ([facility] [address] )     last assessed 11/4/16    PVC's (premature ventricular contractions)     sees cardiology Dr Allyssa camargo       PAST SURGICAL HISTORY:  Past Surgical History:   Procedure Laterality Date    ABDOMINAL SURGERY      CHOLECYSTECTOMY      Percutaneous    OTHER SURGICAL HISTORY      "stimulator to control bowel movements"    CO ESOPHAGOGASTRODUODENOSCOPY TRANSORAL DIAGNOSTIC N/A 9/27/2016    Procedure: ESOPHAGOGASTRODUODENOSCOPY (EGD); Surgeon: Nino Sanchez MD;  Location: [facility];   Service: Gastroenterology    CO LAP,CHOLECYSTECTOMY N/A 2/29/2016    Procedure: LAPAROSCOPIC CHOLECYSTECTOMY ;  Surgeon: Immanuel Foss DO;  Location: [facility];  Service: General    ROTATOR CUFF REPAIR Right     TOE AMPUTATION Right 10/28/2016    Procedure: 3RD TOE AMPUTATION ;  Surgeon: Isaias Quiñones DPM;  Location: [facility];  Service:        ALLERGIES:  No Known Allergies    SOCIAL HISTORY:  History   Alcohol Use No     History   Drug Use No     History   Smoking Status    Never Smoker   Smokeless Tobacco    Never Used       FAMILY HISTORY:  Family History   Problem Relation Age of Onset    Leukemia Mother     Liver disease Mother     Lung cancer Mother         heavy smoker - 3 ppd    Heart disease Father     Liver disease Father     Multiple myeloma Sister     Breast cancer Sister     Urolithiasis Family     Alcohol abuse Neg Hx     Depression Neg Hx     Drug abuse Neg Hx     Substance Abuse Neg Hx        MEDICATIONS:    Current Facility-Administered Medications:     acetaminophen (TYLENOL) tablet 650 mg, 650 mg, Oral, Q6H PRN, Brittnee Cabrera MD    aspirin (ECOTRIN LOW STRENGTH) EC tablet 81 mg, 81 mg, Oral, Daily, Brittnee Cabrera MD, 81 mg at 08/03/18 1000    atorvastatin (LIPITOR) tablet 80 mg, 80 mg, Oral, Daily With MD Keith, 80 mg at 08/02/18 1631    carvedilol (COREG) tablet 6 25 mg, 6 25 mg, Oral, BID With Meals, Brittnee Cabrera MD, 6 25 mg at 08/03/18 0800    famotidine (PEPCID) tablet 20 mg, 20 mg, Oral, Daily, Brittnee Cabrera MD, 20 mg at 08/03/18 1000    [START ON 8/4/2018] gabapentin (NEURONTIN) capsule 600 mg, 600 mg, Oral, Daily, Brittnee Cabrera MD    insulin aspart protamine-insulin aspart (NovoLOG 70/30) 100 units/mL subcutaneous injection 35 Units, 35 Units, Subcutaneous, BID ACBrittnee MD, 35 Units at 08/03/18 0900    insulin lispro (HumaLOG) 100 units/mL subcutaneous injection 1-5 Units, 1-5 Units, Subcutaneous, TID AC, 1 Units at 08/03/18 1114 **AND** Fingerstick Glucose (POCT), , , TID ACBrittnee MD    insulin lispro (HumaLOG) 100 units/mL subcutaneous injection 1-5 Units, 1-5 Units, Subcutaneous, HS, Brittnee Cabrera MD    polyethylene glycol (MIRALAX) packet 17 g, 17 g, Oral, Daily PRN, Brittnee Cabrera MD    REVIEW OF SYSTEMS:  More than 10 systems were reviewed  No other pertinent positive findings other than those mentioned in HPI      PHYSICAL EXAM:  Current Weight: Weight - Scale: 107 kg (236 lb 15 9 oz)  First Weight: Weight - Scale: 107 kg (236 lb 15 9 oz)  Vitals:    08/03/18 0715 08/03/18 0717 08/03/18 0720 08/03/18 1353   BP: 114/68 109/72 117/73 133/76   BP Location: Left arm Left arm Left arm    Pulse: 69 88 98 75   Resp:    20   Temp:    98 3 °F (36 8 °C)   TempSrc:       SpO2:    95%   Weight:       Height:           Intake/Output Summary (Last 24 hours) at 08/03/18 1438  Last data filed at 08/03/18 1200   Gross per 24 hour   Intake              490 ml   Output              200 ml   Net              290 ml     Physical Exam   Constitutional: He appears well-developed and well-nourished  HENT:   Head: Normocephalic and atraumatic  Mouth/Throat: Oropharynx is clear and moist    Eyes: Conjunctivae are normal    Neck: Normal range of motion  Cardiovascular: Normal rate and regular rhythm  Pulmonary/Chest: Effort normal and breath sounds normal    Abdominal: Soft  Bowel sounds are normal    obese   Musculoskeletal: He exhibits no edema  Neurological: He is alert  Right facial droop noted  Dysarthric with some difficulty with word finding  Skin: Skin is warm and dry         Invasive Devices:      Lab Results:     Results from last 7 days  Lab Units 08/03/18  0625 08/01/18  0515 07/31/18  0426 07/30/18  1408  07/28/18  0712   WBC Thousand/uL  --  7 75 7 89 7 64  < >  --    HEMOGLOBIN g/dL  --  12 4 12 4 13 8  < >  --    I STAT HEMOGLOBIN   --   --   --   --   < >  --    HEMATOCRIT %  --  37 7 37 3 41 6  < >  --    PLATELETS Thousands/uL  --  146* 176 190  < >  --    SODIUM mmol/L 139 137 137 139  < > 137   POTASSIUM mmol/L 4 5 4 1 4 2 4 5  < > 4 5   CHLORIDE mmol/L 107 105 105 105  < > 105   CO2 mmol/L 28 27 26 30  < > 26   BUN mg/dL 26* 25 26* 23  < > 26*   CREATININE mg/dL 2 47* 2 25* 2 23* 2 26*  < > 2 37*   CALCIUM mg/dL 8 8 9 0 8 6 9 2  < > 8 8   PHOSPHORUS mg/dL  --  4 1  --   --   --   --    TOTAL PROTEIN g/dL  --   --   --   --   --  6 8   BILIRUBIN TOTAL mg/dL  --   --   --   --   --  0 40   ALK PHOS U/L --   --   --   --   --  133*   ALT U/L  --   --   --   --   --  29   AST U/L  --   --   --   --   --  18   GLUCOSE RANDOM mg/dL 102 154* 186* 167*  < >  --    GLUCOSE, ISTAT   --   --   --   --   < >  --    < > = values in this interval not displayed

## 2018-08-03 NOTE — PROGRESS NOTES
PT Eval     08/03/18 1230   Patient Data   Rehab Impairment Stroke   Etiologic Diagnosis L basal ganglia infarct   Date of Onset 07/29/18   Home Setup   Type of Home Multi Level   Method of Entry Stairs   Number of Stairs 3  (BHR)   Number of Stairs in Home (FF to 2nd floor)   First Floor Setup Available Yes   Home Modifications Necessary? No   Prior Level of Function   Indoor-Mobility (Ambulation) 3  Independent - Patient completed the activities by him/herself, with or without an assistive device, with no assistance from a helper  Stairs 3  Independent - Patient completed the activities by him/herself, with or without an assistive device, with no assistance from a helper  Patient Preference   Nickname (Patient Preference) Bess Jimenez   Restrictions/Precautions   Precautions Bed/chair alarms;Cognitive; Fall Risk;Impulsive;Supervision on toilet/commode   Pain Assessment   Pain Assessment No/denies pain   Pain Score No Pain   QI: Roll Left and Right   Assistance Needed Supervision   Assistance Provided by [place] No physical assistance   Roll Left and Right CARE Score 4   QI: Sit to [address] Provided by [place] No physical assistance   Sit to Lying CARE Score 4   QI: Lying to Sitting on Side of Bed   Assistance Needed Supervision   Assistance Provided by [place] No physical assistance   Lying to Sitting on Side of Bed CARE Score 4   QI: Sit to Stand   Assistance Needed Incidental touching   Assistance Provided by [place] Less than 25%   Sit to Stand CARE Score 3   QI: Chair/Bed-to-Chair Transfer   Assistance Needed Incidental touching   Assistance Provided by [place] Less than 25%   Chair/Bed-to-Chair Transfer CARE Score 3   QI: Car Transfer   Assistance Needed Incidental touching   Assistance Provided by [place] Less than 25%   Car Transfer CARE Score 3   Transfer Bed/Chair/Wheelchair   Limitations Noted In Balance; Coordination;Problem Solving   Adaptive Equipment Roller Walker;None Bed, Chair, Wheelchair Transfer (FIM) 4 - Patient requires steadying assist or light touching   QI: Walk 10 Feet   Assistance Needed Physical assistance; Adaptive equipment   Assistance Provided by [place] Less than 25%   Walk 10 Feet CARE Score 3   QI: Walk 50 Feet with Two Turns   Assistance Needed Physical assistance; Adaptive equipment   Assistance Provided by [place] Less than 25%   Walk 50 Feet with Two Turns CARE Score 3   QI: Walk 150 Feet   Assistance Needed Physical assistance; Adaptive equipment   Assistance Provided by [place] Less than 25%   Walk 150 Feet CARE Score 3   QI: Walking 10 Feet on Uneven Surfaces   Reason if not Attempted Safety concerns   Walking 10 Feet on Uneven Surfaces CARE Score 88   Ambulation   Does the patient walk? 2  Yes   Primary Discharge Mode of Locomotion Walk   Walk Assist Level Contact Guard;Minimum Assist   Gait Pattern Ataxic; Inconsistant Prachi; Forward Flexion; Improper weight shift   Assist Device Adalid Dorothy Church Walked (feet) 150 ft   Limitations Noted In Balance; Coordination;Device Management; Heel Strike; Safety;Speed;Strength;Swing   Walking (FIM) 4 - Patient requires steadying assist or light touching AND distance 150 feet or more, no rest   QI: Wheel 50 Feet with Two Turns   Reason if not Attempted Activity not applicable   Wheel 50 Feet with Two Turns CARE Score 9   QI: Wheel 150 Feet   Reason if not Attempted Activity not applicable   Wheel 761 Feet CARE Score 9   Wheelchair mobility   QI: Does the patient use a wheelchair? 0  No   QI: 1 Step (Curb)   Assistance Needed Physical assistance; Adaptive equipment   Assistance Provided by [place] 25%-49%   1 Step (Curb) CARE Score 3   QI: 4 Steps   Assistance Needed Incidental touching; Adaptive equipment   Assistance Provided by [place] No physical assistance   4 Steps CARE Score 4   QI: 12 Steps   Reason if not Attempted Safety concerns   12 Steps CARE Score 88   Stairs   Type Trainer Steps;Curb   # of Steps 4 Assist Devices Bilateral Rail;Roller Walker   Findings curb step with RW and min A ;  steps with BHR's and CG    Stairs (FIM) 2 - Patient goes up and down 4 - 11 stairs regardless of assist/device/setup   Comprehension   QI: Comprehension 3  Usually Understands: Understands most conversations, but misses some part/intent of message  Requires cues at times to understand  Comprehension (FIM) 4 - Understands basic info/conversation 75-90% of time   Expression   QI: Expression 2  Frequently exhibits difficulty with expressing needs and ideas   Expression (FIM) 3 - Needs to repeat parts of sentences   Social Interaction   Social Interaction (FIM) 5 - Interacts appropriately with others 90% of time   Problem Solving   Problem solving (FIM) 1 - Needs bed alarm at all times   Memory   Memory (FIM) 2 - Recognizes, recalls/performs 25-49%   RLE Assessment   RLE Assessment WFL  (grossly 4/5)   LLE Assessment   LLE Assessment WFL  (grossly 4/5)   Coordination   Movements are Fluid and Coordinated 0   Coordination and Movement Description ataxic; dysmetria with finger to nose    Sensation   Light Touch No apparent deficits   Cognition   Arousal/Participation Alert; Cooperative   Objective Measure   PT Measure(s) Nustep L1 x 7 min; gait training in BWSS (walking fwd, bwd, turning, changes in speed, facilitating arm swing)   Discharge Information   Patient's Discharge Plan return home w/ family support   Patient's Rehab Expectations return to [facility]   Barriers to Discharge Home Decreased Cognitive Function;Decreased Endurance; Safety Considerations   Impressions Pt is 63 y/o male admitted s/p L basal ganglia CVA  PTA pt was independent with ADL's and functional mobility ;lives with his wife and 4 children  Pt was working as a    Currently pt presents with impaired balance, impaired righting reactions, impaired coordination, impaired cognition and safety awareness, impulsivity, dec activity tolerance; overall decline in functional mobility from baseline  Per wife, pt will have S @ home although she returns to work on august 27 but her children will be able to assist with providing supervision  Used RW for transfers and ambulation however pt has dec safety awareness with device management; trialed SPT without AD and pt performs @ CG level  Also focused on gait training in BWSS to normalize gait pattern and improve balance  Pt demonstrates good rehab potential to meet S level goals; ELOS 7-10 days  Pt will benefit from skilled PT to further improve overall functional mobility     PT Therapy Minutes   PT Time In 1230   PT Time Out 1340   PT Total Time (minutes) 70   PT Mode of treatment - Individual (minutes) 70   PT Mode of treatment - Concurrent (minutes) 0   PT Mode of treatment - Group (minutes) 0   PT Mode of treatment - Co-treat (minutes) 0   PT Mode of Teatment - Total time(minutes) 70 minutes

## 2018-08-03 NOTE — PROGRESS NOTES
SLP Cognitive Linguistic Assessment     08/03/18 1030   Pain Assessment   Pain Assessment No/denies pain   Pain Score No Pain   Restrictions/Precautions   Precautions Cognitive;Bed/chair alarms; Fall Risk;Impulsive;Supervision on toilet/commode   Weight Bearing Restrictions No   ROM Restrictions No   Cognitive Linguisitic Assessments   Cognitive Linquistic Quick Test (CLQT) Pt completed the CLQT w/ a Composite Severity Rating Score of 1 0 out of 4 0 correlating to overall severe cognitive linguistic impairment characterized by deficits in attention, memory, executive functions, language and visuospatial skills at time of assessment in comparison to age matched peers  Pt scored below criterion cut score for 10/10 tasks assessed, noting a suspected impact of pt's current language skills on comprehension of tasks, as well as ability to carryover instructions  Pt educated on results of assessment and agreeable to skilled ST services at this time  Executive Function Skills   Problem Solving X   Insight Severe insight   Impulsive Moderately impulsive   Task Initiation Initiates with cues   Flexibility of Thought Reduced flexibility   Planning Reduced planning skills   Organization Moderately disorganized   Memory Skills   Orientation Level Oriented to person;Disoriented to place; Disoriented to time;Disoriented to situation   Long Term Biographical Recall Moderate Impairment   Short Term Immediate Recall Severe Impairment   Short Term Recall of Paragraph Severe Impairment   Short Term Working Recall Severe Impairment   Memory (FIM) 2 - Recognizes, recalls/performs 25-49%   Social Interaction (FIM) 5 - Interacts appropriately with others 90% of time   Auditory Comprehension   Comphrehends Conversation Simple   Interfering Components Working memory; Attention - selective   Verbal Expression   Sentence Level Impairment   Error Types Anomia  (semantic paraphasias, circumlocution)   Speech/Language/Cognition Assessmetn Treatment Assessment Pt completed formalized cognitive linguistic assessment w/ results correlating to overall severe cognitive linguistic impairment characterized by deficits in attention, memory, executive functions, language and visuospatial skills at time of assessment in comparison to age matched peers  During informal assessment, pt oriented to person but disoriented to place, time and situation, regarding verbal cues to re-orient  While pt accurately stated biographical info such as his address,  and wife/children's names, pt inaccurately stated his phone number, current age and ages of children  Pt also w/ decreased attention during tasks, requiring consistent redirections during assessment and repetition of instructions  Of note, pt w/ use of semantic paraphasias throughout session, along w/ use of circumlocution and word finding deficits  Suspecting also language component to pt's deficits which will be further assessed in f/u sessions  Based on current assessment results, pt presenting w/ at least moderate to potentially severe cognitive linguistic deficits at this time, noting decreased insight  Pt will benefit from skilled ST to maximize functional cognitive linguistic skills and overall language skills at this time  SLP Therapy Minutes   SLP Time In 1030   SLP Time Out 1115   SLP Total Time (minutes) 45   SLP Mode of treatment - Individual (minutes) 45   SLP Mode of treatment - Concurrent (minutes) 0   SLP Mode of treatment - Group (minutes) 0   SLP Mode of treatment - Co-treat (minutes) 0   SLP Mode of Teatment - Total time(minutes) 45 minutes   Therapy Time missed   Time missed?  No   Daily FIM Score   Problem solving (FIM) 2 - Needs direction more than ½ time to initiate, plan or complete simple tasks   Comprehension (FIM) 3 - Understands basic info/conversation 50-74% of time   Expression (FIM) 3 - Needs to repeat parts of sentences

## 2018-08-03 NOTE — SOCIAL WORK
CM Evaluation  CM met with patient, wife and 2 daughters to review rehab routine and CM role  Patient lives with wife and 4 children (27, 32, 25, 25) in single level home with 3 RAFAT full bath and bedroom on 1st floor  Per wife and daughter, wife works but is off for the summer and children and rearrange their schedules that patient will have 24/7 supervision if recommended  NO DME  Known to [address] 78 from previous surgery  He uses [facility] [address]  Made aware of [facility]  Informed of weekly team meeting and potential dc needs  Discussed insurance coverage and insurance update due 8/6   CM will follow to assist with dc needs

## 2018-08-03 NOTE — PROGRESS NOTES
Internal Medicine Progress Note  Patient: Marisabel Gould  Age/sex: 62 y o  male  Medical Record #: [id number]      ASSESSMENT/PLAN:  Marisabel Gould is seen and examined and mangement for following issues:    # L CVA:  S/p tPA w/small ICH post infusion; R hemiparesis improving, speech and facial droop improving as well; f/u neurology on d/c; cont ASA/Statin as well as aggressive lifestyle modifications; aggressive therapy per PMR     # DM II:  Previously uncontrolled d/t non compliance - HA1C 7 5; will continue Novolog 70/30 35U 2x daily  Continue SSI and accuchecks  Adjust medications as needed  Need to keep much tighter control - was discussed with pt's wife  BS: 182 119 123 130     # CKD III:  Previous baseline was around 1 8  Today Cr is 2 47  Currently off ACE inhibitor and Cr increasing  Discussed with primary service and renal to be consulted      # HTN:  Cont Coreg 6 25 mg 2 times daily; if creatinine improves would add back aCE  Blood pressure currently stable off ACEI      # h/o spinal stimulator:  Stable     # Non compliance:  Cont to stress the importance of compliance      Subjective: Patient seen and examined  Patient reports wanting to go home  He denies any other complaints      ROS:   GI: denies abdominal pain, change bowel habits or reflux symptoms  Neuro: No new neurologic changes  Respiratory: No Cough, SOB  Cardiovascular: No CP, palpitations     Scheduled Meds:    Current Facility-Administered Medications:  acetaminophen 650 mg Oral Q6H PRN Cassi Son MD   aspirin 81 mg Oral Daily Cassi Son MD   atorvastatin 80 mg Oral Daily With Gerhardt Rolling, MD   carvedilol 6 25 mg Oral BID With Meals Cassi Son MD   famotidine 20 mg Oral Daily Cassi Son MD   insulin aspart protamine-insulin aspart 35 Units Subcutaneous BID AC Cassi Son MD   insulin lispro 1-5 Units Subcutaneous TID AC Cassi Son MD   insulin lispro 1-5 Units Subcutaneous HS Cassi Son MD   polyethylene glycol 17 g Oral Daily PRN Gertrude Rodriguez MD       Labs:       Results from last 7 days  Lab Units 08/01/18  0515 07/31/18  0426   WBC Thousand/uL 7 75 7 89   HEMOGLOBIN g/dL 12 4 12 4   HEMATOCRIT % 37 7 37 3   PLATELETS Thousands/uL 146* 176       Results from last 7 days  Lab Units 08/03/18  0625 08/01/18  0515   SODIUM mmol/L 139 137   POTASSIUM mmol/L 4 5 4 1   CHLORIDE mmol/L 107 105   CO2 mmol/L 28 27   BUN mg/dL 26* 25   CREATININE mg/dL 2 47* 2 25*   GLUCOSE RANDOM mg/dL 102 154*   CALCIUM mg/dL 8 8 9 0       Results from last 7 days  Lab Units 07/28/18  0712   HEMOGLOBIN A1C % 7 5*       Results from last 7 days  Lab Units 07/29/18  1215   INR  0 93          Glucose (mg/dL)   Date Value   08/03/2018 102   08/01/2018 154 (H)   07/31/2018 186 (H)   07/30/2018 167 (H)   01/03/2016 189 (H)   01/02/2016 178 (H)   01/01/2016 113   12/31/2015 128     Glucose, i-STAT (mg/dl)   Date Value   07/29/2018 213 (H)       Labs reviewed    Physical Examination:  Vitals:   Vitals:    08/03/18 0616 08/03/18 0715 08/03/18 0717 08/03/18 0720   BP: 155/86 114/68 109/72 117/73   BP Location: Left arm Left arm Left arm Left arm   Pulse: 71 69 88 98   Resp: 20      Temp: 98 °F (36 7 °C)      TempSrc: Oral      SpO2: 95%      Weight:       Height:           PERRLA EOMI no JVD  RESP: CTAB, no R/R/W  CV: +S1 S2, regular rate, no rubs  ABD: soft, NT, ND, normal BS   EXT:  No edema  Neuro:  Alert and oriented x3  Gravelly voice  Right facial weakness  4/5 strength in the right upper and lower extremity  [ X ] Total time spent: 30 Mins and greater than 50% of this time was spent counseling/coordinating care  ** Please Note: Dragon 360 Dictation voice to text software may have been used in the creation of this document   **

## 2018-08-03 NOTE — ASSESSMENT & PLAN NOTE
· Home coreg increased from 3 125 to 6 25 mg BID  · Home norvasc increased from 5 mg qd to q12  · Renal managing as inpt, pt to FU with Dr Magy Galvan as OP

## 2018-08-03 NOTE — ASSESSMENT & PLAN NOTE
Chronically elevated and stable going back 1 year, currently mildly elevated at 133 () on 7/28/18, OP FU with PCP with further testing/treatment and/or specialist referral at PCP's discretion

## 2018-08-04 LAB
ANION GAP SERPL CALCULATED.3IONS-SCNC: 6 MMOL/L (ref 4–13)
BUN SERPL-MCNC: 30 MG/DL (ref 5–25)
CALCIUM SERPL-MCNC: 9 MG/DL (ref 8.3–10.1)
CHLORIDE SERPL-SCNC: 106 MMOL/L (ref 100–108)
CO2 SERPL-SCNC: 26 MMOL/L (ref 21–32)
CREAT SERPL-MCNC: 2.64 MG/DL (ref 0.6–1.3)
GFR SERPL CREATININE-BSD FRML MDRD: 26 ML/MIN/1.73SQ M
GLUCOSE SERPL-MCNC: 122 MG/DL (ref 65–140)
GLUCOSE SERPL-MCNC: 122 MG/DL (ref 65–140)
GLUCOSE SERPL-MCNC: 135 MG/DL (ref 65–140)
GLUCOSE SERPL-MCNC: 172 MG/DL (ref 65–140)
GLUCOSE SERPL-MCNC: 211 MG/DL (ref 65–140)
POTASSIUM SERPL-SCNC: 4.2 MMOL/L (ref 3.5–5.3)
SODIUM SERPL-SCNC: 138 MMOL/L (ref 136–145)

## 2018-08-04 PROCEDURE — 97110 THERAPEUTIC EXERCISES: CPT | Performed by: PHYSICAL THERAPIST

## 2018-08-04 PROCEDURE — 97530 THERAPEUTIC ACTIVITIES: CPT | Performed by: PHYSICAL THERAPIST

## 2018-08-04 PROCEDURE — 80048 BASIC METABOLIC PNL TOTAL CA: CPT | Performed by: INTERNAL MEDICINE

## 2018-08-04 PROCEDURE — 97112 NEUROMUSCULAR REEDUCATION: CPT | Performed by: PHYSICAL THERAPIST

## 2018-08-04 PROCEDURE — G0515 COGNITIVE SKILLS DEVELOPMENT: HCPCS

## 2018-08-04 PROCEDURE — 97127 HB COGNITIVE SKILLS DEVELOPMENT, EACH 15 MUNUTES: CPT

## 2018-08-04 PROCEDURE — 97116 GAIT TRAINING THERAPY: CPT | Performed by: PHYSICAL THERAPIST

## 2018-08-04 PROCEDURE — 97535 SELF CARE MNGMENT TRAINING: CPT

## 2018-08-04 PROCEDURE — 97530 THERAPEUTIC ACTIVITIES: CPT

## 2018-08-04 PROCEDURE — 82948 REAGENT STRIP/BLOOD GLUCOSE: CPT

## 2018-08-04 PROCEDURE — 92507 TX SP LANG VOICE COMM INDIV: CPT

## 2018-08-04 PROCEDURE — 99232 SBSQ HOSP IP/OBS MODERATE 35: CPT | Performed by: INTERNAL MEDICINE

## 2018-08-04 RX ADMIN — SODIUM CHLORIDE 1000 ML: 0.9 INJECTION, SOLUTION INTRAVENOUS at 10:40

## 2018-08-04 RX ADMIN — GABAPENTIN 600 MG: 300 CAPSULE ORAL at 08:12

## 2018-08-04 RX ADMIN — INSULIN LISPRO 1 UNITS: 100 INJECTION, SOLUTION INTRAVENOUS; SUBCUTANEOUS at 16:29

## 2018-08-04 RX ADMIN — INSULIN LISPRO 1 UNITS: 100 INJECTION, SOLUTION INTRAVENOUS; SUBCUTANEOUS at 10:58

## 2018-08-04 RX ADMIN — ATORVASTATIN CALCIUM 80 MG: 80 TABLET, FILM COATED ORAL at 16:28

## 2018-08-04 RX ADMIN — INSULIN ASPART 35 UNITS: 100 INJECTION, SUSPENSION SUBCUTANEOUS at 08:12

## 2018-08-04 RX ADMIN — FAMOTIDINE 20 MG: 20 TABLET ORAL at 08:12

## 2018-08-04 RX ADMIN — CARVEDILOL 6.25 MG: 6.25 TABLET, FILM COATED ORAL at 08:11

## 2018-08-04 RX ADMIN — INSULIN ASPART 35 UNITS: 100 INJECTION, SUSPENSION SUBCUTANEOUS at 16:28

## 2018-08-04 RX ADMIN — CARVEDILOL 6.25 MG: 6.25 TABLET, FILM COATED ORAL at 16:28

## 2018-08-04 RX ADMIN — ASPIRIN 81 MG: 81 TABLET, COATED ORAL at 08:12

## 2018-08-04 NOTE — PROGRESS NOTES
08/04/18 1110   Pain Assessment   Pain Assessment No/denies pain   Pain Score No Pain   Restrictions/Precautions   Precautions Cognitive; Impulsive;Bed/chair alarms; Fall Risk;Supervision on toilet/commode   Weight Bearing Restrictions No   ROM Restrictions No   Memory Skills   Memory (FIM) 2 - Recognizes, recalls/performs 25-49%   Social Interaction (FIM) 5 - Requires redirection but less than 10% of the time  Language Assessments   Western Aphasia Battery (WAB) Based on completion of CLQT on initial evaluation, language deficits noted, therefore, pt completed Bedside WAB  Pt completed the following subtests: Spontaneous Speech-Content 7/10, Spontaneous Speech-Fluency 9/10, Auditory Verbal Comprehension Yes/No Questions 9/10, Sequential Commands 8/10, Repetition 10/10, and Object Naming 8/10  Based on scores of completion of the Bedside WAB, pt presenting w/ mild anomic type aphasia  Speech/Language/Cognition Assessmetn   Treatment Assessment Pt completed Bedside WAB w/ results correlating to mild anomic type aphasia  Pt's language deficits characterized by word finding deficits, semantic paraphasias, phonemic paraphasias and decreased ability to follow more complex commands and auditory info  Also suspect pt's comprehension is at times impacted by his decreased overall attention  Pt's wife and sons present for portions of session where they were educated on his current deficits and areas to be targeted  Discussed pt's need for 24 hour supervision to which pt's wife will be able to provide until 8/27 but returns to work this date  Discussed speaking w/ case management to provide resources for additional support for family  Educated family on pt's need for outpatient skilled ST following d/c from unit  During session, pt and pt's family noted to be tearful regarding pt's new deficits, however, emotional support and education provided      SLP Therapy Minutes   SLP Time In 8044   SLP Time Out 1150   SLP Total Time (minutes) 40   SLP Mode of treatment - Individual (minutes) 40   SLP Mode of treatment - Concurrent (minutes) 0   SLP Mode of treatment - Group (minutes) 0   SLP Mode of treatment - Co-treat (minutes) 0   SLP Mode of Teatment - Total time(minutes) 40 minutes   Therapy Time missed   Time missed?  No   Daily FIM Score   Problem solving (FIM) 1 - Needs bed alarm at all times   Comprehension (FIM) 3 - Understands basic info/conversation 50-74% of time   Expression (FIM) 3 - Expresses basic info/needs 50-74% of time

## 2018-08-04 NOTE — PROGRESS NOTES
08/04/18 1230   Pain Assessment   Pain Assessment No/denies pain   Pain Score No Pain   Restrictions/Precautions   Precautions Cognitive; Impulsive;Bed/chair alarms; Fall Risk   Weight Bearing Restrictions No   ROM Restrictions No   Subjective   Subjective Pt reports being ready for therapy session    QI: Roll Left and Right   Assistance Needed Supervision   Assistance Provided by [place] No physical assistance   Roll Left and Right CARE Score 4   QI: Sit to [address] Provided by [place] No physical assistance   Sit to Lying CARE Score 4   QI: Lying to Sitting on Side of Bed   Assistance Needed Supervision   Assistance Provided by [place] No physical assistance   Lying to Sitting on Side of Bed CARE Score 4   QI: Sit to Stand   Assistance Needed Physical assistance   Assistance Provided by [place] Less than 25%   Comment CGA for safety, due to impulsive behaivor   Sit to Stand CARE Score 3   Transfer Bed/Chair/Wheelchair   All Transfer Contact Guard   Bed, Chair, Wheelchair Transfer (FIM) 4 - Patient requires steadying assist or light touching   QI: Walk 10 Feet   Assistance Needed Physical assistance   Assistance Provided by [place] Less than 25%   Comment Gait belt wtih 100 feet without AD Min A for correction of balance , with AD CGE    Walk 10 Feet CARE Score 3   QI: Walk 50 Feet with Two Turns   Assistance Needed Physical assistance   Assistance Provided by [place] Less than 25%   Comment Gait belt wtih 100 feet without AD Min A for correction of balance , with AD CGE    Walk 50 Feet with Two Turns CARE Score 3   QI: Walk 150 Feet   Assistance Needed Physical assistance   Assistance Provided by [place] Less than 25%   Comment  feet with RW      Walk 150 Feet CARE Score 3   QI: Walking 10 Feet on Uneven Surfaces   Reason if not Attempted Safety concerns   Walking 10 Feet on Uneven Surfaces CARE Score 88   Ambulation   Findings 200    Walking (FIM) 4 - Patient requires steadying assist or light touching AND distance 150 feet or more, no rest   QI: 1 Step (Curb)   Assistance Needed Verbal cues; Physical assistance   Assistance Provided by [place] Less than 25%   1 Step (Curb) CARE Score 3   QI: 4 Steps   Assistance Needed Physical assistance;Verbal cues   Assistance Provided by [place] Less than 25%   Comment CGA and cuing for seqeunce to ensure not twisting in lines    4 Steps CARE Score 3   QI: 12 Steps   Reason if not Attempted Safety concerns   12 Steps CARE Score 88   Stairs   Type Stairs   # of Steps 4   Assist Devices Bilateral Rail   Findings Trainer step 3 cycles, PT management of IV pole    QI: Picking Up Object   Assistance Needed Physical assistance   Assistance Provided by [place] 25%-49%   Comment Min to Mod A for safety with bean bag  15 items  without AD, gait belt used by PT    Picking Up Object CARE Score 3   QI: Toilet Transfer   Assistance Needed Incidental touching   Comment CGA   Toilet Transfer CARE Score 4   Therapeutic Interventions   Strengthening sit to stands without UE support 2 sets, 10 reps  Balance Standing ball toss 20 times static, 20 times with ball toss with stepping on command left, right, fwd and backwards  Neuromuscular Re-Education static standing eyes closed 30 seconds, 5 reps  Side stepping L/R 30 feet x 2 sets, backwards walking 20 feet 2 sets  Equipment Use   NuStep 10 minutes lvl 1    Assessment   Treatment Assessment Focus of PT session on gait with balance without UE support of RW  use of gait belt with min to mod A for safety with tasks this date with multi direction stepping with activities  tendency to display posterior loss of balance with bending and challnege with bakcwards stepping and side stepping with narrow base of support  Patient had no issues with  steps this date with UE support  required cuing for IV pole management which was conducted by PT   Patient will continue to benefit from skilled PT to ensure highest functional level is achieved  PT Therapy Minutes   PT Time In 1230   PT Time Out 1400   PT Total Time (minutes) 90   PT Mode of treatment - Individual (minutes) 90   PT Mode of treatment - Concurrent (minutes) 0   PT Mode of treatment - Group (minutes) 0   PT Mode of treatment - Co-treat (minutes) 0   PT Mode of Teatment - Total time(minutes) 90 minutes   Therapy Time missed   Time missed?  No

## 2018-08-04 NOTE — PROGRESS NOTES
OT   08/04/18 0830   Pain Assessment   Pain Assessment No/denies pain   Pain Score No Pain   Restrictions/Precautions   Precautions Bed/chair alarms;Cognitive; Fall Risk;Impulsive;Supervision on toilet/commode   QI: Eating   Assistance Needed Set-up / clean-up   Assistance Provided by [place] No physical assistance   Eating CARE Score 5   Eating Assessment   Meal Assessed Breakfast   QI: Swallowing/Nutritional Status Regular food   Current Diet Regular   Findings Pt noted w/ quick movements during self feeding of breakfast  Pt required VC from OT to slow down during self feeding  Eating (FIM) 5 - Patient needs help to open contianers or set up tray   QI: Putting On/Taking Off Footwear   Assistance Needed Physical assistance   Assistance Provided by [place] Less than 25%   Comment Pt seated EOB to don b/l sneakers for OT session  Pt required A to tie shoe laces  Putting On/Taking Off Footwear CARE Score 3   QI: Sit to Stand   Assistance Needed Incidental touching   Assistance Provided by [place] Less than 25%   Comment w/ RW   Sit to Stand CARE Score 3   QI: Chair/Bed-to-Chair Transfer   Assistance Needed Incidental touching   Assistance Provided by [place] Less than 25%   Comment CGA w/ RW   Chair/Bed-to-Chair Transfer CARE Score 3   Transfer Bed/Chair/Wheelchair   Positioning Concerns Cognition   Limitations Noted In Balance; Coordination; Endurance;LE Strength   Adaptive Equipment Roller Walker   Stand Pivot Contact Guard   Sit to Stand Other  (CGA)   Stand to Sit Other  (CGA)   Findings Pt attempts to get up without RW or assist at start of session, OT provided edu on safety and the need to have A from staff and to utilize RW to maintain pt safety  Pt  impulsive and cont to require VC to slow down to inc safety during fxnl transfers  Pt requires repetitive VC for safe hand placement and noted w/ improvement in safety during transfers at end of session after mass repetition      Bed, Chair, Wheelchair Transfer (FIM) 4 - Patient requires steadying assist or light touching   QI: [address] Needed Incidental touching   Assistance Provided by [place] Less than 25%   Toileting Hygiene CARE Score 3   Toileting   Able to [address] down yes, up yes  Manage Hygiene Bladder   Limitations Noted In Balance; Coordination; Safety   Findings Pt steadying A in stance for CM to urinate  Pt noted unsteady and required VC to utilize GB for unilateral support  2nd person present for safety 2* pt c/o dizziness w/ inc ambulation  Pt highly impulsive in bathroom and require max VC to slow down w/ all movements  Toileting (FIM) 4 - Patient requires steadying assist or light touching   QI: Toilet Transfer   Reason if not Attempted Activity not applicable   Toilet Transfer CARE Score 9   Toilet Transfer   Findings In stance to urinate  Toilet Transfer (FIM) 0 - Activity does not occur   Functional Standing Tolerance   Time 2 mins 36 secs, 4 mins 29 secs, 5 mins 15 secs, 3 mins  Activity Bean Bag Toss   Comments Pt Roni w/ RW in stance to engage in theract of bean bag toss w/ focus on dynamic standing balance, activity tolerance, attending to L side, cognition, and attention to task  Pt required to reach out of [place] overhead and to L side to retrieve bean bag from OT  Pt required to provide an animal for each letter of the alphabet prior to throwing each bean bag  Pt noted w/ decreased target accuracy when engaged in conversation, when answering animals, or w/ inc stimuli in surrounding environment  Target placed on ground slightly to the L of patient due to pt noted w/ decreased attention to L side during fxnl ambulation from room to OT gym  Pt required multiple seated rest breaks t/o activity 2* fatigue  No LOB noted  Cognition   Overall Cognitive Status Impaired   Arousal/Participation Alert; Cooperative   Attention Difficulty attending to directions   Orientation Level Oriented to person   Memory Decreased recall of precautions;Decreased recall of recent events;Decreased short term memory;Decreased long term memory   Following Commands Follows one step commands with increased time or repetition   Comments Pt reports date as "[birth year] 2nd 1998"  Pt able to correct day and month w/ prompting from OT, however could not recall correct year  Pt states being in "[place]"  Vision   Vision Comments glasses, L eye ptosis   Activity Tolerance   Activity Tolerance Patient tolerated treatment well   Assessment   Treatment Assessment Pt participated in skilled OT Tx session w/ focus on dynamic standing balance, attention, activity tolerance, safety awareness, fxnl cognition, and toileting  See above note for activity detail  Pt c/o dizziness during ambulation from OT gym to room at end of session and presents w/ decreased coordination 2* dizziness and fatigue  Pt seated w/ BP taken at 142/76  Pt cont to c/o dizziness while seated, staff aware  Pt noted w/ decreased attention t/o session and required VC to attend to tasks  Pt noted w/ bumping into wall on L side during ambulation w/ RW and chair follow to OT gym  Cont OT POC w/ focus on dynamic standing balance, attention to task, safety awareness, coordination, endurance, UE strengthening, and fxnl cognition to maximize pt indep w/ ADLs/IADLs and all fxnl transfers  Prognosis Good   Problem List Decreased strength;Decreased endurance; Impaired balance;Decreased mobility; Decreased coordination;Decreased cognition; Impaired judgement;Decreased safety awareness; Obesity   Barriers to Discharge Inaccessible home environment;Decreased caregiver support   Plan   Treatment/Interventions ADL retraining;Functional transfer training;LE strengthening/ROM; Therapeutic exercise; Endurance training;Cognitive reorientation;Patient/family training;Equipment eval/education; Bed mobility; Compensatory technique education   Progress Progressing toward goals   OT Therapy Minutes   OT Time In 0830   OT Time Out 0945   OT Total Time (minutes) 75   OT Mode of treatment - Individual (minutes) 75   OT Mode of treatment - Concurrent (minutes) 0   OT Mode of treatment - Group (minutes) 0   OT Mode of treatment - Co-treat (minutes) 0   OT Mode of Teatment - Total time(minutes) 75 minutes   Therapy Time missed   Time missed?  No

## 2018-08-04 NOTE — PROGRESS NOTES
NEPHROLOGY PROGRESS NOTE   Mica Marin 62 y o  male MRN: [id number]  Unit/Bed#: [facility] 543-17 Encounter: [id number]  Reason for Consult:  Acute kidney injury    ASSESSMENT/PLAN:  1  Acute kidney injury, rising creatinine, may be component of prerenal azotemia versus progression of his disease versus a hemodynamic effect from improved blood pressure  2  Chronic kidney disease, baseline previously 1 9-2 2  3  Recent CVA  4  Diabetes  5  Hypertension, overall stable     PLAN:  · Would give 1 L normal saline at 50 cc/hour  · Continue with antihypertensive regimen  · Repeat laboratory studies tomorrow    SUBJECTIVE:  Seen and examined  Patient awake alert  Denies any chest pain or shortness of Breath  Appetite seems to be stable  Review of Systems    OBJECTIVE:  Current Weight: Weight - Scale: 107 kg (236 lb 15 9 oz)  Vitals:    08/03/18 1640 08/03/18 2032 08/03/18 2300 08/04/18 0531   BP: 158/79 (!) 175/79 132/61 134/64   BP Location:  Left arm Left arm Left arm   Pulse: 74 75  72   Resp:  18  18   Temp:  98 5 °F (36 9 °C)  98 °F (36 7 °C)   TempSrc:  Oral  Oral   SpO2:  96%  96%   Weight:       Height:           Intake/Output Summary (Last 24 hours) at 08/04/18 1007  Last data filed at 08/04/18 0541   Gross per 24 hour   Intake              430 ml   Output              500 ml   Net              -70 ml       Physical Exam   Constitutional: No distress  HENT:   Head: Normocephalic  Eyes: No scleral icterus  Neck: Neck supple  Cardiovascular: Normal rate and regular rhythm  Pulmonary/Chest: Effort normal and breath sounds normal    Abdominal: Soft  He exhibits distension  Musculoskeletal: He exhibits no edema  Neurological: He is alert  Skin: Skin is warm and dry  Psychiatric: He has a normal mood and affect         Medications:    Current Facility-Administered Medications:     acetaminophen (TYLENOL) tablet 650 mg, 650 mg, Oral, Q6H PRN, Aroldo Jacob MD    aspirin (ECOTRIN LOW STRENGTH) EC tablet 81 mg, 81 mg, Oral, Daily, Johnathan Foote MD, 81 mg at 08/04/18 8978    atorvastatin (LIPITOR) tablet 80 mg, 80 mg, Oral, Daily With Lazarus Vaughn MD, 80 mg at 08/03/18 1640    carvedilol (COREG) tablet 6 25 mg, 6 25 mg, Oral, BID With Meals, Johnathan Foote MD, 6 25 mg at 08/04/18 0811    famotidine (PEPCID) tablet 20 mg, 20 mg, Oral, Daily, Johnathan Foote MD, 20 mg at 08/04/18 3908    gabapentin (NEURONTIN) capsule 600 mg, 600 mg, Oral, Daily, Johnathan Foote MD, 600 mg at 08/04/18 1656    insulin aspart protamine-insulin aspart (NovoLOG 70/30) 100 units/mL subcutaneous injection 35 Units, 35 Units, Subcutaneous, BID AC, Johnathan Foote MD, 35 Units at 08/04/18 7315    insulin lispro (HumaLOG) 100 units/mL subcutaneous injection 1-5 Units, 1-5 Units, Subcutaneous, TID AC, 1 Units at 08/03/18 1114 **AND** Fingerstick Glucose (POCT), , , TID ACJohnathan MD    insulin lispro (HumaLOG) 100 units/mL subcutaneous injection 1-5 Units, 1-5 Units, Subcutaneous, HS, Johnathan Foote MD    polyethylene glycol (MIRALAX) packet 17 g, 17 g, Oral, Daily PRN, Johnathan Foote MD    sodium chloride 0 9 % bolus 1,000 mL, 1,000 mL, Intravenous, Once, Alphonse Found, DO    Laboratory Results:    Results from last 7 days  Lab Units 08/04/18  0539 08/03/18  0625 08/01/18  0515 07/31/18  0426 07/30/18  1408 07/29/18  1219 07/29/18  1215   WBC Thousand/uL  --   --  7 75 7 89 7 64  --  7 42   HEMOGLOBIN g/dL  --   --  12 4 12 4 13 8  --  13 3   I STAT HEMOGLOBIN g/dl  --   --   --   --   --  13 3  --    HEMATOCRIT %  --   --  37 7 37 3 41 6  --  39 7   PLATELETS Thousands/uL  --   --  146* 176 190  --  171   SODIUM mmol/L 138 139 137 137 139  --  137   POTASSIUM mmol/L 4 2 4 5 4 1 4 2 4 5  --  4 4   CHLORIDE mmol/L 106 107 105 105 105  --  104   CO2 mmol/L 26 28 27 26 30  --  26   BUN mg/dL 30* 26* 25 26* 23  --  27*   CREATININE mg/dL 2 64* 2 47* 2 25* 2 23* 2 26*  --  2 14*   CALCIUM mg/dL 9 0 8 8 9 0 8 6 9 2  --  8 7 PHOSPHORUS mg/dL  --   --  4 1  --   --   --   --    GLUCOSE RANDOM mg/dL 122 102 154* 186* 167*  --  212*   GLUCOSE, ISTAT mg/dl  --   --   --   --   --  213*  --

## 2018-08-04 NOTE — PROGRESS NOTES
Internal Medicine Progress Note  Patient: Loreto Marroquin  Age/sex: 62 y o  male  Medical Record #: [id number]      ASSESSMENT/PLAN:  Loreto Marroquin is seen and examined and mangement for following issues:    # L CVA:  S/p tPA w/small ICH post infusion; R hemiparesis improving, speech and facial droop improving as well; f/u neurology on d/c; cont ASA/Statin as well as aggressive lifestyle modifications; aggressive therapy per PMR      # DM II:  Previously uncontrolled d/t non compliance - HA1C 7 5; will continue Novolog 70/30 35U 2x daily  Continue SSI and accuchecks  Adjust medications as needed  Need to keep much tighter control - was discussed with pt's wife  BS: 184 101 128 135     # CKD III:  Previous baseline was around 1 8  Currently off ACE inhibitor and Cr increasing - 2 67 today  Renal following and pt getting IV fluids      # HTN:  Cont Coreg 6 25 mg 2 times daily; if creatinine improves would add back aCE  Blood pressure currently stable off ACEI      # h/o spinal stimulator:  Stable     # Non compliance:  Cont to stress the importance of compliance      Subjective: Patient seen and examined  Pt states he is doing well  His wife is at the bedside  He denies any other complaints      ROS:   GI: denies abdominal pain, change bowel habits or reflux symptoms  Neuro: No new neurologic changes  Respiratory: No Cough, SOB  Cardiovascular: No CP, palpitations     Scheduled Meds:    Current Facility-Administered Medications:  acetaminophen 650 mg Oral Q6H PRN Anamika Brasewll MD   aspirin 81 mg Oral Daily Anamika Braswell MD   atorvastatin 80 mg Oral Daily With Amarjit Damon MD   carvedilol 6 25 mg Oral BID With Meals Anamika Braswell MD   famotidine 20 mg Oral Daily Anamika Braswell MD   gabapentin 600 mg Oral Daily Anamika Braswell MD   insulin aspart protamine-insulin aspart 35 Units Subcutaneous BID AC Anamika Braswell MD   insulin lispro 1-5 Units Subcutaneous TID AC Anamika Braswell MD   insulin lispro 1-5 Units Subcutaneous HS Floyd Rosa MD   polyethylene glycol 17 g Oral Daily PRN Floyd Rosa MD       Labs:       Results from last 7 days  Lab Units 08/01/18  0515 07/31/18  0426   WBC Thousand/uL 7 75 7 89   HEMOGLOBIN g/dL 12 4 12 4   HEMATOCRIT % 37 7 37 3   PLATELETS Thousands/uL 146* 176       Results from last 7 days  Lab Units 08/04/18  0539 08/03/18  0625   SODIUM mmol/L 138 139   POTASSIUM mmol/L 4 2 4 5   CHLORIDE mmol/L 106 107   CO2 mmol/L 26 28   BUN mg/dL 30* 26*   CREATININE mg/dL 2 64* 2 47*   GLUCOSE RANDOM mg/dL 122 102   CALCIUM mg/dL 9 0 8 8           Results from last 7 days  Lab Units 07/29/18  1215   INR  0 93          Glucose (mg/dL)   Date Value   08/04/2018 122   08/03/2018 102   08/01/2018 154 (H)   07/31/2018 186 (H)   01/03/2016 189 (H)   01/02/2016 178 (H)   01/01/2016 113   12/31/2015 128     Glucose, i-STAT (mg/dl)   Date Value   07/29/2018 213 (H)       Labs reviewed    Physical Examination:  Vitals:   Vitals:    08/03/18 1640 08/03/18 2032 08/03/18 2300 08/04/18 0531   BP: 158/79 (!) 175/79 132/61 134/64   BP Location:  Left arm Left arm Left arm   Pulse: 74 75  72   Resp:  18  18   Temp:  98 5 °F (36 9 °C)  98 °F (36 7 °C)   TempSrc:  Oral  Oral   SpO2:  96%  96%   Weight:       Height:           PERRLA EOMI no JVD  RESP: CTAB, no R/R/W  CV: +S1 S2, regular rate, no rubs  ABD: soft, NT, ND, normal BS   EXT:  No edema  Neuro:  Alert and oriented x3  Gravelly voice  Right facial weakness  4/5 strength in the right upper and lower extremity  [ X ] Total time spent: 30 Mins and greater than 50% of this time was spent counseling/coordinating care  ** Please Note: Dragon 360 Dictation voice to text software may have been used in the creation of this document   **

## 2018-08-05 ENCOUNTER — APPOINTMENT (INPATIENT)
Dept: RADIOLOGY | Facility: HOSPITAL | Age: 58
DRG: 057 | End: 2018-08-05
Payer: COMMERCIAL

## 2018-08-05 LAB
ANION GAP SERPL CALCULATED.3IONS-SCNC: 7 MMOL/L (ref 4–13)
BUN SERPL-MCNC: 30 MG/DL (ref 5–25)
CALCIUM SERPL-MCNC: 8.7 MG/DL (ref 8.3–10.1)
CHLORIDE SERPL-SCNC: 109 MMOL/L (ref 100–108)
CO2 SERPL-SCNC: 25 MMOL/L (ref 21–32)
CREAT SERPL-MCNC: 2.49 MG/DL (ref 0.6–1.3)
GFR SERPL CREATININE-BSD FRML MDRD: 27 ML/MIN/1.73SQ M
GLUCOSE SERPL-MCNC: 122 MG/DL (ref 65–140)
GLUCOSE SERPL-MCNC: 123 MG/DL (ref 65–140)
GLUCOSE SERPL-MCNC: 170 MG/DL (ref 65–140)
GLUCOSE SERPL-MCNC: 198 MG/DL (ref 65–140)
GLUCOSE SERPL-MCNC: 208 MG/DL (ref 65–140)
POTASSIUM SERPL-SCNC: 4.5 MMOL/L (ref 3.5–5.3)
SODIUM SERPL-SCNC: 141 MMOL/L (ref 136–145)

## 2018-08-05 PROCEDURE — 97112 NEUROMUSCULAR REEDUCATION: CPT

## 2018-08-05 PROCEDURE — 97127 HB COGNITIVE SKILLS DEVELOPMENT, EACH 15 MUNUTES: CPT

## 2018-08-05 PROCEDURE — 97530 THERAPEUTIC ACTIVITIES: CPT

## 2018-08-05 PROCEDURE — 97110 THERAPEUTIC EXERCISES: CPT

## 2018-08-05 PROCEDURE — G0515 COGNITIVE SKILLS DEVELOPMENT: HCPCS

## 2018-08-05 PROCEDURE — 82948 REAGENT STRIP/BLOOD GLUCOSE: CPT

## 2018-08-05 PROCEDURE — 80048 BASIC METABOLIC PNL TOTAL CA: CPT | Performed by: INTERNAL MEDICINE

## 2018-08-05 PROCEDURE — 70450 CT HEAD/BRAIN W/O DYE: CPT

## 2018-08-05 PROCEDURE — 97116 GAIT TRAINING THERAPY: CPT

## 2018-08-05 RX ADMIN — CARVEDILOL 6.25 MG: 6.25 TABLET, FILM COATED ORAL at 16:24

## 2018-08-05 RX ADMIN — INSULIN LISPRO 1 UNITS: 100 INJECTION, SOLUTION INTRAVENOUS; SUBCUTANEOUS at 11:28

## 2018-08-05 RX ADMIN — GABAPENTIN 600 MG: 300 CAPSULE ORAL at 08:01

## 2018-08-05 RX ADMIN — ATORVASTATIN CALCIUM 80 MG: 80 TABLET, FILM COATED ORAL at 16:24

## 2018-08-05 RX ADMIN — INSULIN ASPART 35 UNITS: 100 INJECTION, SUSPENSION SUBCUTANEOUS at 08:01

## 2018-08-05 RX ADMIN — ASPIRIN 81 MG: 81 TABLET, COATED ORAL at 08:01

## 2018-08-05 RX ADMIN — INSULIN LISPRO 1 UNITS: 100 INJECTION, SOLUTION INTRAVENOUS; SUBCUTANEOUS at 16:24

## 2018-08-05 RX ADMIN — INSULIN ASPART 35 UNITS: 100 INJECTION, SUSPENSION SUBCUTANEOUS at 16:24

## 2018-08-05 RX ADMIN — ACETAMINOPHEN 650 MG: 325 TABLET, FILM COATED ORAL at 10:57

## 2018-08-05 RX ADMIN — FAMOTIDINE 20 MG: 20 TABLET ORAL at 08:01

## 2018-08-05 RX ADMIN — INSULIN LISPRO 1 UNITS: 100 INJECTION, SOLUTION INTRAVENOUS; SUBCUTANEOUS at 21:21

## 2018-08-05 RX ADMIN — CARVEDILOL 6.25 MG: 6.25 TABLET, FILM COATED ORAL at 08:01

## 2018-08-05 NOTE — PROGRESS NOTES
9 family member present at pt's bedside  Applied  2L NC for comfort  He resting now with family holding his hand  Updated family that I called radiology room & they are reviewing the ct scan  Neuro exam unchanged  Vitals stable

## 2018-08-05 NOTE — PROGRESS NOTES
Spoke to [facility] regarding the final CT scan results  No new changes found  Family updated

## 2018-08-05 NOTE — PROGRESS NOTES
Currently working with physical therapy  Overall renal function slightly improved now to 2 4, near previous baseline  Given 1 L of normal saline yesterday  Blood pressure appears acceptable  Repeat laboratory studies tomorrow

## 2018-08-05 NOTE — PROGRESS NOTES
08/05/18 1050   Pain Assessment   Pain Assessment 0-10   Pain Score 3   Pain Type Acute pain   Pain Location Head   Pain Orientation Bilateral   Hospital Pain Intervention(s) (nursing informed and provided medication)   Response to Interventions pt reported no pain at end of session   Restrictions/Precautions   Precautions Cognitive; Impulsive;Bed/chair alarms; Fall Risk   Weight Bearing Restrictions No   ROM Restrictions No   Memory Skills   Memory (FIM) 2 - Recognizes, recalls/performs 25-49%   Social Interaction (FIM) 4 - Interacts 75-89% of time   Speech/Language/Cognition Assessmetn   Treatment Assessment Pt participated in skilled ST session w/ focus on cognitive linguistic skills w/ pt's wife present for duration of session  Targeting STM recall, pt accurately recalled bfast items w/ 4/4 accuracy, however, initially experienced decreased recall of visitors from yesterday, stating that his nieces and nephews visited  When cued for recall of pt's children visiting, along w/ additional verbal cues, pt recalled 7/7 visitors  Targeting problem solving, pt presented w/ a picture scenarios of common scenarios related to the home and community where pt independently ID'd unsafe scenarios in 10/13 trials, made predictions about possible outcomes in 13/13 trials and provided safe and appropriate solutions in 9/13 trials  When provided w/ semantic and contextual cues, pt increased problem solving skills but was noted to benefit from extended time  During a 4-step picture sequencing task, pt correctly sequenced all steps in 2/4 total trials where min-mod cues were used to assist in correcting sequencing skills   When describing each picture scene, pt noted w/ mild word finding deficits and frequent use of semantic paraphasias, again benefiting from semantic and initial phonemic cues to improve word retrieval  Given a category exclusion task targeting basic concepts, pt was 5/10 accurate w/ task in determining which word to exclude, however, did require verbal and semantic cues for verbal reasoning when stating category of other words  When returning to pt's room w/ use of RW, pt noted to be impulsive w/ decreased carryover of strategies to slow down and for safe use of RW  Pt attempted to lift RW in the air to avoid an obstacle and also attempted to leave RW in middle of room, attempting to walk independently to recliner  Pt provided w/ physical assist for using RW and re-educated on need for RW at all time, as well as need for assistance ambulating to maintain safety at this time  Pt cont to present w/ decreased STM recall and carryover, decreased attention/problem solving, decreased safety awareness and judgement, decreased comprehension/expression and decreased insight, impacting overall safety awareness  Will cont to benefit from skilled ST at this time to maximize functional cognitive linguistic skills for improved safety and independence  SLP Therapy Minutes   SLP Time In 1209   SLP Time Out 1150   SLP Total Time (minutes) 60   SLP Mode of treatment - Individual (minutes) 60   SLP Mode of treatment - Concurrent (minutes) 0   SLP Mode of treatment - Group (minutes) 0   SLP Mode of treatment - Co-treat (minutes) 0   SLP Mode of Teatment - Total time(minutes) 60 minutes   Therapy Time missed   Time missed?  No   Daily FIM Score   Problem solving (FIM) 1 - Needs bed alarm at all times   Comprehension (FIM) 4 - Understands basic info/conversation 75-90% of time   Expression (FIM) 4 - Expresses basic info/needs 75-90% of time

## 2018-08-05 NOTE — PROGRESS NOTES
08/05/18 1230   Pain Assessment   Pain Assessment 0-10   Pain Score No Pain   Restrictions/Precautions   Precautions Impulsive;Cognitive;Bed/chair alarms;Supervision on toilet/commode; Fall Risk   QI: Sit to Lying   Assistance Needed Physical assistance   Assistance Provided by [place] 25%-49%   Sit to Lying CARE Score 3   QI: Sit to Stand   Assistance Needed Supervision;Verbal cues   Assistance Provided by [place] No physical assistance   Sit to Stand CARE Score 4   Bed Mobility   Supine to Sit 4  Minimal assistance   QI: Chair/Bed-to-Chair Transfer   Assistance Needed Incidental touching   Assistance Provided by [place] Less than 25%   Chair/Bed-to-Chair Transfer CARE Score 3   Transfer Bed/Chair/Wheelchair   Limitations Noted In Problem Solving;Balance; Coordination;LE Strength   Adaptive Equipment Roller Walker   Findings Pt cont to demo impulstivity and dec safety awareness during fxnl mobility and xfers  Pt req Mod verbal and tactile cues to slow gait pace  Pt req Min A w/ RW management, attempting to abandon RW several times while ambulating to therapy gym  Bed, Chair, Wheelchair Transfer (FIM) 4 - Patient requires steadying assist or light touching   Exercise Tools   Exercise Tools Yes   Theraputty Pt removed several small items from green theraputty to facilitate pincer grasp, inc b/l hand strength, and incorperate b/ integration  Pt tolerated well w/ Min vc's to attend to task  Attention to task improved w/ goal given, eg : # of items to retrieve  Cognition   Overall Cognitive Status Impaired   Arousal/Participation Cooperative; Alert   Attention Attends with cues to redirect   Orientation Level Oriented to person;Oriented to place   Memory Decreased recall of precautions;Decreased short term memory   Following Commands Follows multistep commands with increased time or repetition   Comments Pt participated in classifying mats ther act to focus on attention to task, visual scanning, and STM   Pt asked to retrieve picture card based on category, pictures placed on primarily on L side to encourage L-side scanning  Pt add'l req to name picture, demo inc difficulty with task  Pt completed 5 sets of categories x2 trials, achieving 50% accuracy  W/ prompts to assist problem-solving, pt able to answer fix mistakes/answer correctly  Pt demo appropriate frustration, inc time given and emotional support provided at this time  T/o task, pt became easily distracted by others in room, req Min vc's to attend to task  Assessment   Treatment Assessment Pt seen for skilled OT session focusing on cognitive prashant, fxnl xfer training, and RW management  Full details on session noted above  Pt cont to be limited by impaired safety awareness, impulstivity, STM, and attention to task  Pt req Mod vc's to reiterate RW management, energy conservation techniques, and safety awareness strategies  Pt's wife present for session, encouraged her to cont edu t/o day to maximize carryover  Toward end of cognitive prashant, pt having c/o fatigue, dizziness, and lightheadedness  RN Alison Cao made aware  Rashel Ureña PA-C to assess in therapy gym, /81, requesting pt return to bed  Pt demo inc lethargy and delayed response  Wife expressing concern for pt, emotional support provided at this time  Pt was left resting in bed w/ all needs within reach, RN made aware pt supine in bed  Prognosis Good   Problem List Decreased endurance; Impaired balance;Decreased mobility; Decreased coordination;Decreased cognition; Impaired judgement;Decreased safety awareness   Plan   Treatment/Interventions ADL retraining;Functional transfer training; Therapeutic exercise; Endurance training;Cognitive reorientation;Patient/family training;Bed mobility;Gait training;Continued evaluation   Progress Progressing toward goals   OT Therapy Minutes   OT Time In 1230   OT Time Out 1330   OT Total Time (minutes) 60   OT Mode of treatment - Individual (minutes) 60   OT Mode of treatment - Concurrent (minutes) 0   OT Mode of treatment - Group (minutes) 0   OT Mode of treatment - Co-treat (minutes) 0   OT Mode of Teatment - Total time(minutes) 60 minutes   Therapy Time missed   Time missed?  No

## 2018-08-05 NOTE — PROGRESS NOTES
Spoke to [facility] regarding pt having anxiety attack with multiple family members at the bedside also upset  Spoke to radiology reading room now at 1434 & they are reading the ct scan  Vitals signs improved from earlier

## 2018-08-05 NOTE — PROGRESS NOTES
Internal Medicine Progress Note  Patient: Erlinda Alatorre  Age/sex: 62 y o  male  Medical Record #: [id number]      ASSESSMENT/PLAN:  Erlinda Alatorre is seen and examined and mangement for following issues:    # L CVA:  S/p tPA w/small ICH post infusion; R hemiparesis improving, speech and facial droop improving as well; f/u neurology on d/c; cont ASA/Statin as well as aggressive lifestyle modifications; aggressive therapy per PMR      # DM II:  Previously uncontrolled d/t non compliance - HA1C 7 5; will continue Novolog 70/30 35U 2x daily  Continue SSI and accuchecks  Adjust medications as needed  Need to keep much tighter control - was discussed with pt's wife  BS: 211 172 122 122     # CKD III:  Previous baseline was 1 8-2 2  Currently off ACE inhibitor  Renal following  Cr improved with IV fluids      # HTN:  Cont Coreg 6 25 mg 2 times daily; if creatinine improves would add back aCE  Blood pressure currently stable off ACEI      # h/o spinal stimulator:  Stable     # Non compliance:  Cont to stress the importance of compliance      Subjective: Patient seen and examined  Pt states he is doing well  His wife is at the bedside  He denies any other complaints      ROS:   GI: denies abdominal pain, change bowel habits or reflux symptoms  Neuro: No new neurologic changes  Respiratory: No Cough, SOB  Cardiovascular: No CP, palpitations     Scheduled Meds:    Current Facility-Administered Medications:  acetaminophen 650 mg Oral Q6H PRN Basil Cosme MD   aspirin 81 mg Oral Daily Basil Cosme MD   atorvastatin 80 mg Oral Daily With Tennille Byrd MD   carvedilol 6 25 mg Oral BID With Meals Basil Cosme MD   famotidine 20 mg Oral Daily Basil Cosme MD   gabapentin 600 mg Oral Daily Basil Cosme MD   insulin aspart protamine-insulin aspart 35 Units Subcutaneous BID AC Basil Cosme MD   insulin lispro 1-5 Units Subcutaneous TID AC Basil Cosme MD   insulin lispro 1-5 Units Subcutaneous HS Basil Cosme MD   polyethylene glycol 17 g Oral Daily PRN Juan Carlos Kang MD       Labs:       Results from last 7 days  Lab Units 08/01/18  0515 07/31/18  0426   WBC Thousand/uL 7 75 7 89   HEMOGLOBIN g/dL 12 4 12 4   HEMATOCRIT % 37 7 37 3   PLATELETS Thousands/uL 146* 176       Results from last 7 days  Lab Units 08/05/18  0535 08/04/18  0539   SODIUM mmol/L 141 138   POTASSIUM mmol/L 4 5 4 2   CHLORIDE mmol/L 109* 106   CO2 mmol/L 25 26   BUN mg/dL 30* 30*   CREATININE mg/dL 2 49* 2 64*   GLUCOSE RANDOM mg/dL 123 122   CALCIUM mg/dL 8 7 9 0           Results from last 7 days  Lab Units 07/29/18  1215   INR  0 93          Glucose (mg/dL)   Date Value   08/05/2018 123   08/04/2018 122   08/03/2018 102   08/01/2018 154 (H)   01/03/2016 189 (H)   01/02/2016 178 (H)   01/01/2016 113   12/31/2015 128     Glucose, i-STAT (mg/dl)   Date Value   07/29/2018 213 (H)       Labs reviewed    Physical Examination:  Vitals:   Vitals:    08/04/18 1323 08/04/18 2037 08/05/18 0532 08/05/18 0756   BP: 127/72 153/76 161/86 139/76   BP Location: Left arm Left arm Left arm Right arm   Pulse: 73 68 73 72   Resp: 20 18 18    Temp: 97 6 °F (36 4 °C) 98 1 °F (36 7 °C) 98 3 °F (36 8 °C)    TempSrc: Oral Oral Oral    SpO2: 96% 94% 96%    Weight:       Height:           PERRLA EOMI no JVD  RESP: CTAB, no R/R/W  CV: +S1 S2, regular rate, no rubs  ABD: soft, NT, ND, normal BS   EXT:  No edema  Neuro:  Alert and oriented x3  Gravelly voice  Right facial weakness  4/5 strength in the right upper and lower extremity  [ X ] Total time spent: 30 Mins and greater than 50% of this time was spent counseling/coordinating care  ** Please Note: Dragon 360 Dictation voice to text software may have been used in the creation of this document   **

## 2018-08-05 NOTE — PROGRESS NOTES
Occupational Therapist reporting pt c/o sudden dizziness   Aleksander Jones  PAC going to assess pt now in therapy gym  Vitals being obtained  BP elevated  Pt feels very exhausted  Sitting  in the chair  [facility] did a neuro exam- no new changes  She is ordering a stat repeat ct scan of head  Pt escorted back to bed in w/c  Vitals rechecked & BP down to 163/81  Pt denies any pain or further dizziness  Wife at bedside & is aware of the new testing  Wife was present with pt when he had the dizziness episode

## 2018-08-05 NOTE — PROGRESS NOTES
08/05/18 0830   Pain Assessment   Pain Assessment No/denies pain   Pain Score No Pain   Restrictions/Precautions   Precautions Bed/chair alarms; Fall Risk;Impulsive;Cognitive   Weight Bearing Restrictions No   ROM Restrictions No   Cognition   Overall Cognitive Status Impaired   Arousal/Participation Cooperative; Alert   Attention Attends with cues to redirect   Orientation Level Oriented to person;Oriented to place   Memory Decreased recall of precautions   Following Commands Follows multistep commands with increased time or repetition   Comments Pt is impulsive at times and requires frequent redirection    Subjective   Subjective Patient wager to participae in PT session  QI: Roll Left and Right   Assistance Needed Supervision   Assistance Provided by [place] No physical assistance   Roll Left and Right CARE Score 4   QI: Sit to [address] Provided by [place] No physical assistance   Sit to Lying CARE Score 4   QI: Lying to Sitting on Side of Bed   Assistance Needed Supervision   Assistance Provided by [place] No physical assistance   Lying to Sitting on Side of Bed CARE Score 4   QI: Sit to [address] Provided by [place] No physical assistance   Comment verbal instruciton for safety awareness    Sit to Stand CARE Score 4   Bed Mobility   Able to Roll Left to Right;Right to Left;Scoot Up;Scoot Down   QI: Chair/Bed-to-Chair Transfer   Assistance Needed Incidental touching   Assistance Provided by [place] Less than 25%   Chair/Bed-to-Chair Transfer CARE Score 3   Transfer Bed/Chair/Wheelchair   Limitations Noted In Balance; Coordination; Endurance; Sequencing;Problem Solving   Adaptive Equipment Walker   Sit to [employer]   Stand to Sit Supervision   Supine to Sit Supervision   Sit to Supine Supervision   Car Transfer Contact Guard   All Transfer Contact Guard   Findings Requires instruction to pace self      Bed, Chair, Wheelchair Transfer (FIM) 4 - Patient requires steadying assist or light touching   QI: Car Transfer   Assistance Needed Incidental touching   Assistance Provided by [place] Less than 25%   Car Transfer CARE Score 3   QI: Walk 10 Feet   Assistance Needed Incidental touching;Physical assistance   Assistance Provided by [place] Less than 25%   Walk 10 Feet CARE Score 3   QI: Walk 50 Feet with Two Turns   Assistance Needed Incidental touching;Physical assistance   Assistance Provided by [place] Less than 25%   Walk 50 Feet with Two Turns CARE Score 3   QI: Walk 150 Feet   Assistance Needed Incidental touching;Physical assistance   Assistance Provided by [place] Less than 25%   Comment 2 overt LOB that required assistance to regain    Walk 150 Feet CARE Score 3   QI: Walking 10 Feet on Uneven Surfaces   Reason if not Attempted Safety concerns   Walking 10 Feet on Uneven Surfaces CARE Score 88   Ambulation   Does the patient walk? 2  Yes   Primary Discharge Mode of Locomotion Walk   Walk Assist Level Minimum Assist   Gait Pattern Improper weight shift; Inconsistant Prachi   Assist Device Roller Walker;Cane  (+ HHA )   Distance Walked (feet) 200 ft   Limitations Noted In Balance; Coordination; Endurance; Heel Strike;Posture; Safety; Sequencing;Strength   Findings 200'x3 with RW + 150' with SPC + 150' with HHA   Walking (FIM) 4 - Patient requires steadying assist or light touching AND distance 150 feet or more, no rest   Wheelchair mobility   QI: Does the patient use a wheelchair? 0  No   QI: 1 Step (Curb)   Assistance Needed Incidental touching   Assistance Provided by [place] Less than 25%   1 Step (Curb) CARE Score 3   QI: 4 Steps   Assistance Needed Verbal cues; Incidental touching   Assistance Provided by [place] Less than 25%   4 Steps CARE Score 3   Stairs   Type Stairs;Curb;Ramp   # of Steps 4   Weight Bearing Precautions Fall Risk   Assist Devices Bilateral Rail   Findings  steps + ramp with RW + curb step with RW    Stairs (FIM) 2 - Patient goes up and down 4 - 11 stairs regardless of assist/device/setup   QI: Picking Up Object   Reason if not Attempted Safety concerns   Picking Up Object CARE Score 88   Therapeutic Interventions   Strengthening 5x sit<->stands with UE support   Neuromuscular Re-Education 20'x2 walking with horizontal head turns + HHA ; 20'x2 walking with vertical head turns+HHA; 20'x2 side stepping Bilateral; 20'x2 backwards walking 150' walking with no AD; 20'x4 walking with turns around cones   Assessment   Treatment Assessment Pt engaged in PT treatment session focussing on neur re-ed and functioanl mobility  Pt remains at a risk for falls 2* balance deficits and safety awareness  During tasks, patient with 2 overt LOB durin ambulation with RW that requried assistance to regain  Balance deficits increase with multi tasking and distractions  Pt able to follow direction to pace self but quickly returns to impulsive behaviors  Will contnue to require skilled PT intevention to progress functional mobility (I) and safety  Problem List Decreased endurance; Impaired balance;Decreased mobility; Decreased coordination;Decreased cognition; Impaired judgement;Decreased safety awareness   PT Barriers   Functional Limitation Walking   Plan   Treatment/Interventions Functional transfer training;LE strengthening/ROM; Elevations; Therapeutic exercise; Endurance training;Cognitive reorientation;Patient/family training;Equipment eval/education; Bed mobility;Gait training; Compensatory technique education   Progress Progressing toward goals   PT Therapy Minutes   PT Time In 0830   PT Time Out 0930   PT Total Time (minutes) 60   PT Mode of treatment - Individual (minutes) 60   PT Mode of treatment - Concurrent (minutes) 0   PT Mode of treatment - Group (minutes) 0   PT Mode of treatment - Co-treat (minutes) 0   PT Mode of Teatment - Total time(minutes) 60 minutes   Therapy Time missed   Time missed?  No     Lori Anthony, PT

## 2018-08-06 LAB
ANION GAP SERPL CALCULATED.3IONS-SCNC: 7 MMOL/L (ref 4–13)
BASOPHILS # BLD AUTO: 0.03 THOUSANDS/ΜL (ref 0–0.1)
BASOPHILS NFR BLD AUTO: 0 % (ref 0–1)
BUN SERPL-MCNC: 31 MG/DL (ref 5–25)
CALCIUM SERPL-MCNC: 9 MG/DL (ref 8.3–10.1)
CHLORIDE SERPL-SCNC: 109 MMOL/L (ref 100–108)
CO2 SERPL-SCNC: 26 MMOL/L (ref 21–32)
CREAT SERPL-MCNC: 2.32 MG/DL (ref 0.6–1.3)
EOSINOPHIL # BLD AUTO: 0.13 THOUSAND/ΜL (ref 0–0.61)
EOSINOPHIL NFR BLD AUTO: 2 % (ref 0–6)
ERYTHROCYTE [DISTWIDTH] IN BLOOD BY AUTOMATED COUNT: 13.7 % (ref 11.6–15.1)
GFR SERPL CREATININE-BSD FRML MDRD: 30 ML/MIN/1.73SQ M
GLUCOSE P FAST SERPL-MCNC: 106 MG/DL (ref 65–99)
GLUCOSE SERPL-MCNC: 106 MG/DL (ref 65–140)
GLUCOSE SERPL-MCNC: 126 MG/DL (ref 65–140)
GLUCOSE SERPL-MCNC: 144 MG/DL (ref 65–140)
GLUCOSE SERPL-MCNC: 144 MG/DL (ref 65–140)
GLUCOSE SERPL-MCNC: 152 MG/DL (ref 65–140)
HCT VFR BLD AUTO: 38.2 % (ref 36.5–49.3)
HGB BLD-MCNC: 12.2 G/DL (ref 12–17)
IMM GRANULOCYTES # BLD AUTO: 0.07 THOUSAND/UL (ref 0–0.2)
IMM GRANULOCYTES NFR BLD AUTO: 1 % (ref 0–2)
LYMPHOCYTES # BLD AUTO: 2.03 THOUSANDS/ΜL (ref 0.6–4.47)
LYMPHOCYTES NFR BLD AUTO: 24 % (ref 14–44)
MCH RBC QN AUTO: 29 PG (ref 26.8–34.3)
MCHC RBC AUTO-ENTMCNC: 31.9 G/DL (ref 31.4–37.4)
MCV RBC AUTO: 91 FL (ref 82–98)
MONOCYTES # BLD AUTO: 0.65 THOUSAND/ΜL (ref 0.17–1.22)
MONOCYTES NFR BLD AUTO: 8 % (ref 4–12)
NEUTROPHILS # BLD AUTO: 5.42 THOUSANDS/ΜL (ref 1.85–7.62)
NEUTS SEG NFR BLD AUTO: 65 % (ref 43–75)
NRBC BLD AUTO-RTO: 0 /100 WBCS
PLATELET # BLD AUTO: 156 THOUSANDS/UL (ref 149–390)
PMV BLD AUTO: 11.6 FL (ref 8.9–12.7)
POTASSIUM SERPL-SCNC: 4.5 MMOL/L (ref 3.5–5.3)
RBC # BLD AUTO: 4.21 MILLION/UL (ref 3.88–5.62)
SODIUM SERPL-SCNC: 142 MMOL/L (ref 136–145)
WBC # BLD AUTO: 8.33 THOUSAND/UL (ref 4.31–10.16)

## 2018-08-06 PROCEDURE — 97127 HB COGNITIVE SKILLS DEVELOPMENT, EACH 15 MUNUTES: CPT

## 2018-08-06 PROCEDURE — G0515 COGNITIVE SKILLS DEVELOPMENT: HCPCS

## 2018-08-06 PROCEDURE — 85025 COMPLETE CBC W/AUTO DIFF WBC: CPT | Performed by: INTERNAL MEDICINE

## 2018-08-06 PROCEDURE — 97110 THERAPEUTIC EXERCISES: CPT

## 2018-08-06 PROCEDURE — 99232 SBSQ HOSP IP/OBS MODERATE 35: CPT | Performed by: PHYSICAL MEDICINE & REHABILITATION

## 2018-08-06 PROCEDURE — 97530 THERAPEUTIC ACTIVITIES: CPT

## 2018-08-06 PROCEDURE — 99232 SBSQ HOSP IP/OBS MODERATE 35: CPT | Performed by: INTERNAL MEDICINE

## 2018-08-06 PROCEDURE — 82948 REAGENT STRIP/BLOOD GLUCOSE: CPT

## 2018-08-06 PROCEDURE — 97116 GAIT TRAINING THERAPY: CPT

## 2018-08-06 PROCEDURE — 80048 BASIC METABOLIC PNL TOTAL CA: CPT | Performed by: INTERNAL MEDICINE

## 2018-08-06 RX ORDER — BACITRACIN, NEOMYCIN, POLYMYXIN B 400; 3.5; 5 [USP'U]/G; MG/G; [USP'U]/G
1 OINTMENT TOPICAL 2 TIMES DAILY
Status: DISCONTINUED | OUTPATIENT
Start: 2018-08-06 | End: 2018-08-13 | Stop reason: HOSPADM

## 2018-08-06 RX ORDER — CARVEDILOL 6.25 MG/1
6.25 TABLET ORAL 2 TIMES DAILY WITH MEALS
Status: DISCONTINUED | OUTPATIENT
Start: 2018-08-06 | End: 2018-08-13 | Stop reason: HOSPADM

## 2018-08-06 RX ORDER — AMLODIPINE BESYLATE 5 MG/1
5 TABLET ORAL DAILY
Status: DISCONTINUED | OUTPATIENT
Start: 2018-08-06 | End: 2018-08-06

## 2018-08-06 RX ORDER — AMLODIPINE BESYLATE 5 MG/1
5 TABLET ORAL DAILY
Status: DISCONTINUED | OUTPATIENT
Start: 2018-08-07 | End: 2018-08-08

## 2018-08-06 RX ADMIN — FAMOTIDINE 20 MG: 20 TABLET ORAL at 08:01

## 2018-08-06 RX ADMIN — INSULIN ASPART 35 UNITS: 100 INJECTION, SUSPENSION SUBCUTANEOUS at 16:25

## 2018-08-06 RX ADMIN — ASPIRIN 81 MG: 81 TABLET, COATED ORAL at 08:01

## 2018-08-06 RX ADMIN — ATORVASTATIN CALCIUM 80 MG: 80 TABLET, FILM COATED ORAL at 16:24

## 2018-08-06 RX ADMIN — BACITRACIN, NEOMYCIN, POLYMYXIN B 1 SMALL APPLICATION: 400; 3.5; 5 OINTMENT TOPICAL at 13:52

## 2018-08-06 RX ADMIN — INSULIN ASPART 35 UNITS: 100 INJECTION, SUSPENSION SUBCUTANEOUS at 08:00

## 2018-08-06 RX ADMIN — INSULIN LISPRO 1 UNITS: 100 INJECTION, SOLUTION INTRAVENOUS; SUBCUTANEOUS at 11:37

## 2018-08-06 RX ADMIN — BACITRACIN, NEOMYCIN, POLYMYXIN B 1 SMALL APPLICATION: 400; 3.5; 5 OINTMENT TOPICAL at 17:18

## 2018-08-06 RX ADMIN — CARVEDILOL 6.25 MG: 6.25 TABLET, FILM COATED ORAL at 08:01

## 2018-08-06 RX ADMIN — GABAPENTIN 600 MG: 300 CAPSULE ORAL at 08:01

## 2018-08-06 RX ADMIN — CARVEDILOL 6.25 MG: 6.25 TABLET, FILM COATED ORAL at 16:25

## 2018-08-06 NOTE — PROGRESS NOTES
Occupational Therapy Treatment Note       08/06/18 0862   Pain Assessment   Pain Assessment No/denies pain   Restrictions/Precautions   Precautions Impulsive;Cognitive;Bed/chair alarms;Supervision on toilet/commode; Fall Risk   Lifestyle   Autonomy "When will I be able to go home?"    Grooming   Able To Wash/Dry Hands   Limitation Noted In Coordination;Strength;Timeliness   Grooming (FIM) 5 - Patient requires supervision/monitoring   QI: Sit to Stand   Assistance Needed Incidental touching   Assistance Provided by [place] Less than 25%   Sit to Stand CARE Score 3   QI: Chair/Bed-to-Chair Transfer   Assistance Needed Physical assistance   Assistance Provided by [place] 25%-49%   Comment no device    Chair/Bed-to-Chair Transfer CARE Score 3   Transfer Bed/Chair/Wheelchair   Positioning Concerns Cognition   Limitations Noted In Balance; Coordination;Problem Solving   Adaptive Equipment None   Sit to Stand Minimal;Assist x 1   Stand to Sit Minimal;Assist x 1   Findings pt requires min assist for functional mobility with no device    Bed, Chair, Wheelchair Transfer (FIM) 4 - Patient requires steadying assist or light touching   QI: [address] Needed Incidental touching   Assistance Provided by [place] Less than 25%   Toileting Hygiene CARE Score 3   Toileting   Able to [address] down yes, up yes  Manage Hygiene Bladder   Limitations Noted In Balance; Coordination;Problem Solving; Safety   Toileting (FIM) 4 - Patient requires steadying assist or light touching   Meal Prep   Meal Prep Level (none)   Meal Prep Level of Assistance Close supervision;Maximum verbal cues   Meal Preparation Pt engaged in meal preparation task - making spaghetti - at stovetop  Pt reports he cooks dinner at home PTA and will make spaghetti  Pt required instruction to locate directions on box, as well as assistance to read directions 50% of the time   Prior to reading directions for cook time, pt reports he would cook spaghetti noodles for "30 minutes"  OTR/L again re-directed pt to find directions labeled on box to cook for 6-8 minutes  Pt able to recall cook time for 8 minutes s/p 2 minute delay when setting timer  Throughout task pt required cues 50-75% of the time 2* deficits in short-term memory, problem solving, safety and judgement  Pt's cognitive deficits limit pt's independence and safety during IADLS  Kitchen Mobility   Kitchen-Mobility Level (no device )   Kitchen Activity Retrieve items;Transport items   Kitchen Mobility Comments Pt requires min assist for functional mobility/transfers with no device during kitchen mobility  Pt able to retrieve items from below knee cabinet and above head cabinet with min assist for steadying  OTR/L educated pt on carrying pot of boiled water by sliding item across countertop 2* impaired standing balance, pt demonstrates fair carryover  Pt limited 2* impulsivity with kitchen mobility and impaired static/dynamic standing balance and righting reactions  Cognition   Overall Cognitive Status Impaired   Arousal/Participation Cooperative   Attention Attends with cues to redirect   Orientation Level Oriented to person;Oriented to place   Memory Decreased recall of precautions;Decreased recall of recent events;Decreased short term memory   Following Commands Follows one step commands without difficulty   Comments pt immediately recalls 3/3 words, however recalls 1/3 s/p 2 minute delay  Pt's deficits in memory, safety, insight, and judgement limit pt's independence  Additionally pt is impulsive  Activity Tolerance   Activity Tolerance Patient tolerated treatment well   Assessment   Treatment Assessment Pt participated in skilled OT tx session focused on meal preparation, kitchen mobility, functional mobility/transfers, and toileting  See above for further details on functional performance   Pt noted with deficits in (R) UE coordination, cognition, standing balance, and also noted with expressive language deficits  Pt will continue to benefit from skilled OT intervention to address functional cognition, including short-term memory, attention, safety awareness, and static/dynamic standing balance, in order to maximize functional independence in ADLS, functional mobility/transfers and IADLS, while decreasing burden of care  Prognosis Good   Problem List Decreased strength;Decreased endurance; Impaired balance;Decreased mobility; Decreased coordination;Decreased cognition; Impaired judgement;Decreased safety awareness   Barriers to Discharge Inaccessible home environment;Decreased caregiver support   Plan   Treatment/Interventions ADL retraining;Functional transfer training; Therapeutic exercise; Endurance training;Patient/family training;Equipment eval/education; Bed mobility; Compensatory technique education   Progress Progressing toward goals   Recommendation   OT Discharge Recommendation (to be determined pending pt's progress )   OT Therapy Minutes   OT Time In 0845   OT Time Out 0925   OT Total Time (minutes) 40   OT Mode of treatment - Individual (minutes) 40   OT Mode of treatment - Concurrent (minutes) 0   OT Mode of treatment - Group (minutes) 0   OT Mode of treatment - Co-treat (minutes) 0   OT Mode of Teatment - Total time(minutes) 40 minutes   Therapy Time missed   Time missed?  No       Mona Willson MS, OTR/L , CBIS

## 2018-08-06 NOTE — PROGRESS NOTES
Physical Medicine and Rehabilitation Progress Note  Erlinda Alatorre 62 y o  male MRN: [id number]  Unit/Bed#: -53 Encounter: [id number]    HPI: Erlinda Alatorre is a 62 y o  male who [email or web address] with Rt sided weakness, Rt facial droop, and slurred speech  Patient was given TPA and responded well and does not currently have paresis, slurred speech, or facial droop  Subjective: D/W patient and spouse barriers to dc home at this time         ROS:  Negative except as oultined above     Assessment/Plan:       Thrombocytopenia (HCC)   Assessment & Plan    Currently WNL         CKD (chronic kidney disease)   Assessment & Plan    Baseline Cr appears to be 2 0-2 25, renal managing (follows with Dr Amanda Moran as OP), currently 2 32        Elevated alkaline phosphatase level   Assessment & Plan    Chronically elevated going back 1 year, currently mildly elevated at 133 ()         Cognitive impairment   Assessment & Plan    Per family present x 1 year, OP FU [facility] for further testing/management         Neuropathy   Assessment & Plan    Likely 2/2 to DM, on home neurontin 600 qd        Essential hypertension   Assessment & Plan    Home coreg increased from 3 125 to 6 25 mg BID  On home norvasc 5 mg qd         Hyperlipidemia   Assessment & Plan    Home lovastatin 20 mg qpm changed to lipitor 80 mg qpm per neuro for CVA        Type 2 diabetes mellitus with hyperglycemia ([facility] [address] )   Assessment & Plan    On home novolog 70/30 35 units BID        * Ischemic cerebrovascular accident (CVA) ([facility] [address] )   Assessment & Plan    On home ASA 81 mg qd  Home lovastatin 20 mg qpm changed to lipitor 80 mg qpm per neuro           Scheduled Meds:    Current Facility-Administered Medications:  acetaminophen 650 mg Oral Q6H PRN Basil Cosme MD   Paco Primes ON 8/7/2018] amLODIPine 5 mg Oral Daily Ashley Hodges MD   aspirin 81 mg Oral Daily Basil Cosme MD   atorvastatin 80 mg Oral Daily With Fermin Mcdermott MD   carvedilol 6 25 mg Oral BID With Meals Mai Martinez MD   famotidine 20 mg Oral Daily Edenilson Lux MD   gabapentin 600 mg Oral Daily Edenilson Lux MD   insulin aspart protamine-insulin aspart 35 Units Subcutaneous BID AC Edenilson Lux MD   insulin lispro 1-5 Units Subcutaneous TID AC Edenilson Lux MD   insulin lispro 1-5 Units Subcutaneous HS Edenilson Lux MD   neomycin-bacitracin-polymyxin b 1 small application Topical BID Karen Taylor PA-C   polyethylene glycol 17 g Oral Daily PRN Edenilson Lux MD        DVT ppx: SCDs b/l, patient ambulating sufficient distances     Will defer to PCP with further testing/treatment and/or specialist referral at PCP's discretion      Incidental findings noted on CT A/P 8/2016:  1) Rt renal cysts  2) cirrhosis/portal hypertension  3) splenomegaly     Other incidental findings:  1) grade I DD  2) Rt carotid and B/L vertebral artery stenosis    Objective:    Functional Update:  Mobility: min   Transfers: cg-sup  ADLs: mod     Allergies per EMR    Physical Exam:    General: alert, no apparent distress, cooperative and comfortable  HEENT:  Head: Normocephalic, no lesions, without obvious abnormality  CARDIAC:  +s1/2  LUNGS:  respirations unlabored   ABDOMEN:  soft NT   EXTREMITIES:  no significant LE edema   NEURO:   awake, alert, approriately answering questions   PSYCH:  mood/affect currently stable       Diagnostic Studies: reviewed, no new imaging  Ct Head Wo Contrast    Result Date: 8/5/2018  Impression: Expected evolution of the known ischemic infarct in the left basal ganglion since prior CT  No hemorrhagic conversion  No new ischemic infarcts are seen  No new intraparenchymal hemorrhages are seen  No hydrocephalus   Workstation performed: [id number]       Laboratory:      Results from last 7 days  Lab Units 08/06/18  0521 08/01/18  0515 07/31/18  0426   HEMOGLOBIN g/dL 12 2 12 4 12 4   HEMATOCRIT % 38 2 37 7 37 3   WBC Thousand/uL 8 33 7 75 7 89       Results from last 7 days  Lab Units 08/06/18  0521 08/05/18  0535 08/04/18  0539   BUN mg/dL 31* 30* 30*   SODIUM mmol/L 142 141 138   POTASSIUM mmol/L 4 5 4 5 4 2   CHLORIDE mmol/L 109* 109* 106   GLUCOSE RANDOM mg/dL 106 123 122   CREATININE mg/dL 2 32* 2 49* 2 64*            Patient Active Problem List   Diagnosis    Type 2 diabetes mellitus with hyperglycemia (HCC)    Hyperlipidemia    Essential hypertension    Ischemic cerebrovascular accident (CVA) ([facility] [address] )    Neuropathy    Cognitive impairment    Elevated alkaline phosphatase level    CKD (chronic kidney disease)    Thrombocytopenia (HCC)           Total time spent: Minimum 25 minutes was spent face-to-face with the patient

## 2018-08-06 NOTE — SOCIAL WORK
Clinical update faxed to [facility], [phone], awaiting determination  Received a call from mari at [facility] approving stay with next review due 8/13

## 2018-08-06 NOTE — PROGRESS NOTES
08/06/18 1230   Pain Assessment   Pain Assessment No/denies pain   Pain Score No Pain   Restrictions/Precautions   Precautions Cognitive; Fall Risk;Bed/chair alarms; Impulsive;Supervision on toilet/commode   Weight Bearing Restrictions No   ROM Restrictions No   QI: Sit to Stand   Assistance Needed Supervision   Assistance Provided by [place] No physical assistance   Sit to Stand CARE Score 4   QI: Chair/Bed-to-Chair Transfer   Assistance Needed Incidental touching   Chair/Bed-to-Chair Transfer CARE Score 4   Transfer Bed/Chair/Wheelchair   Positioning Concerns Cognition   Limitations Noted In Balance; Coordination;Problem Solving   Adaptive Equipment None   Stand Pivot Contact Guard   Sit to Stand Supervision   Stand to Sit Supervision   Findings Pt requires CGA w/ transfers due to balance and coordination  Pt continues to be impulsive and move quickly in transfers, requiring VC to slow down  Bed, Chair, Wheelchair Transfer (FIM) 4 - Patient requires steadying assist or light touching   Cognition   Overall Cognitive Status Impaired   Arousal/Participation Cooperative   Attention Attends with cues to redirect   Orientation Level Oriented to person;Oriented to place   Memory Decreased recall of precautions;Decreased recall of recent events;Decreased short term memory   Following Commands Follows one step commands without difficulty   Vision   Vision Comments Pt completed three visual activities to assess scanning, visual attention, shape awareness, and object identification  Pt able to match 6/6 shapes to their matching partner  Pt able to choose 5/6 correct shape differences  Pt then particiapted in a word search to address visual attention, functional scanning, and overall vision  Pt struggled w/ puzzle and required VC for sequencing and attention  Ot reduced the visual field and stimulus for the pt to find words  Pt's visual acuity was off when identifying single letters   Pt required OT to point to specific letters to find words  Pts ability to draw circles was very sloppy and would leave missed letters on tail and beginning  Pt unable to complete task  Pt's wife then mentioned that he gets his eyes drained of blood, he has a build up  Pt is having blurry vision in his L eye  OT patched L eye, put on pts glasses, and gave the pt another word search to complete  Pt improved and was able to find 5 words in 8 minutes of searching  Pt legibility of circles became more accurate  Pt instructed to wear patch when needed  Dr Joey Rosas notified  Activity Tolerance   Activity Tolerance Patient tolerated treatment well   Assessment   Treatment Assessment Pt participated in skilled OT session focused on vision and activity tolerance  Pt participated in activities to focus on different visual perceptions to assess where the pt's deficits are  During session, wife mentioned that the pt has visual deficits in eyes PTA and is awaiting a procedure on his L eye  OT provided patch for double vision and it helped the pt to see  Encourage pt to wear glasses as well  Pt continues to require assitance w/ balance, attention, RUE coordination/strength, [address]  Pt is a good candidate for skilled OT services to increase independence and safety in ADLs and IADLs  OT Family training done with: Wife   Assessment of family training Pt's wife was very supportive during session  Pt listened to her cues well  Pt's daughter came at the end of session, and pt was so overjoyed that he began to cry  Appears as if the family is a huge support for pt  Prognosis Good   Barriers to Discharge Inaccessible home environment;Decreased caregiver support   Plan   Treatment/Interventions ADL retraining;Functional transfer training;LE strengthening/ROM; Therapeutic exercise; Endurance training;Cognitive reorientation;Patient/family training;Equipment eval/education; Bed mobility; Compensatory technique education   Progress Progressing toward goals   OT Therapy Minutes OT Time In 1230   OT Time Out 1330   OT Total Time (minutes) 60   OT Mode of treatment - Individual (minutes) 60   OT Mode of treatment - Concurrent (minutes) 0   OT Mode of treatment - Group (minutes) 0   OT Mode of treatment - Co-treat (minutes) 0   OT Mode of Teatment - Total time(minutes) 60 minutes   Therapy Time missed   Time missed?  No

## 2018-08-06 NOTE — PROGRESS NOTES
[address] [facility] NOTE   Bettie Moore 62 y o  male MRN: [id number]  Unit/Bed#: [facility] 868-61 Encounter: [id number]  Reason for Consult: ADEOLA on CKD    ASSESSMENT and PLAN:    62year old male with a past medical history of uncontrolled diabetes type 2, neuropathy, hypertension, hyperlipidemia, who initially presented to the hospital on 07/29 and was found to have acute left-sided CVA after presenting with slurred speech facial droop and right hemiparesis  Patient received tPA, which was complicated by small intracranial hemorrhage  Patient is now in the [facility] unit  Nephrology is consulted for acute kidney injury  1) acute on chronic kidney injury-    -patient follows with Dr Magy Galvan   -baseline creatinine 1 9-2 2 with recent baseline 2 2-2 4  -compliance was an issue in the past  -patient had a contrast on 07/29  -urinalysis-glucose positive, 4+ protein, 2-4 RBC   -patient has yet to complete the serologies that were ordered in the outpatient setting  These can be completed in the outpatient setting unless the renal function does not stabilize  -I/O  -avoid nephrotoxic agents   - Cr 2 3 today, stable  2) CVA - as per Primary Team    3)hypertension     - continue carvedilol  - add amlodipine  - in future, may transition amlodipine to ACEi    5) acid/base - bicarb stable    6) electrolytes - stable      SUBJECTIVE / INTERVAL HISTORY:    Pt denies complaints  Denies SOB  Intake 1 5  UOP not strictly reported   -187 (187 yest afternoon)    OBJECTIVE:  Current Weight: Weight - Scale: 107 kg (236 lb 15 9 oz)  Vitals:    08/05/18 1341 08/05/18 1424 08/05/18 2045 08/06/18 0602   BP: 163/81 153/77 145/79 167/77   BP Location: Left arm Left arm Left arm Left arm   Pulse: 65 68 68 73   Resp: 18  18    Temp: 98 °F (36 7 °C)  97 5 °F (36 4 °C) 97 8 °F (36 6 °C)   TempSrc: Oral  Oral Oral   SpO2: 95% 98% 95% 97%   Weight:       Height:           Intake/Output Summary (Last 24 hours) at 08/06/18 0804  Last data filed at 08/05/18 1735   Gross per 24 hour   Intake              240 ml   Output                0 ml   Net              240 ml     General: NAD  Skin: no rash  Eyes: anicteric sclera  ENT: moist mucous membrane  Neck: supple  Chest: CTA b/l  CVS: s1s2  Abdomen: soft, nontender, distention   Extremities: no edema LE  : no chávez  Neuro: AAXO3  Psych: normal affect    Medications:    Current Facility-Administered Medications:     acetaminophen (TYLENOL) tablet 650 mg, 650 mg, Oral, Q6H PRN, Leah Ashraf MD, 650 mg at 08/05/18 1057    aspirin (ECOTRIN LOW STRENGTH) EC tablet 81 mg, 81 mg, Oral, Daily, Leah Ashraf MD, 81 mg at 08/06/18 0801    atorvastatin (LIPITOR) tablet 80 mg, 80 mg, Oral, Daily With Saba Hatch MD, 80 mg at 08/05/18 1624    carvedilol (COREG) tablet 6 25 mg, 6 25 mg, Oral, BID With Meals, Leah Ashraf MD, 6 25 mg at 08/06/18 0801    famotidine (PEPCID) tablet 20 mg, 20 mg, Oral, Daily, Leah Ashraf MD, 20 mg at 08/06/18 0801    gabapentin (NEURONTIN) capsule 600 mg, 600 mg, Oral, Daily, Leah Ashraf MD, 600 mg at 08/06/18 0801    insulin aspart protamine-insulin aspart (NovoLOG 70/30) 100 units/mL subcutaneous injection 35 Units, 35 Units, Subcutaneous, BID ACLeah MD, 35 Units at 08/06/18 0800    insulin lispro (HumaLOG) 100 units/mL subcutaneous injection 1-5 Units, 1-5 Units, Subcutaneous, TID AC, 1 Units at 08/05/18 1624 **AND** Fingerstick Glucose (POCT), , , TID Leah COBURN MD    insulin lispro (HumaLOG) 100 units/mL subcutaneous injection 1-5 Units, 1-5 Units, Subcutaneous, HS, Leah Ashraf MD, 1 Units at 08/05/18 2121    polyethylene glycol (MIRALAX) packet 17 g, 17 g, Oral, Daily PRN, Leah Ashraf MD    Laboratory Results:    Results from last 7 days  Lab Units 08/06/18  0521 08/05/18  0535 08/04/18  0539 08/03/18  0625 08/01/18  0515 07/31/18  0426 07/30/18  1408   WBC Thousand/uL 8 33  --   --   --  7 75 7 89 7 64   HEMOGLOBIN g/dL 12 2  --   --   -- 12 4 12 4 13 8   HEMATOCRIT % 38 2  --   --   --  37 7 37 3 41 6   PLATELETS Thousands/uL 156  --   --   --  146* 176 190   SODIUM mmol/L 142 141 138 139 137 137 139   POTASSIUM mmol/L 4 5 4 5 4 2 4 5 4 1 4 2 4 5   CHLORIDE mmol/L 109* 109* 106 107 105 105 105   CO2 mmol/L 26 25 26 28 27 26 30   BUN mg/dL 31* 30* 30* 26* 25 26* 23   CREATININE mg/dL 2 32* 2 49* 2 64* 2 47* 2 25* 2 23* 2 26*   CALCIUM mg/dL 9 0 8 7 9 0 8 8 9 0 8 6 9 2   PHOSPHORUS mg/dL  --   --   --   --  4 1  --   --    GLUCOSE RANDOM mg/dL 106 123 122 102 154* 186* 167*

## 2018-08-06 NOTE — PROGRESS NOTES
08/06/18 1430   Pain Assessment   Pain Assessment 0-10   Pain Score No Pain   Restrictions/Precautions   Precautions Cognitive; Fall Risk;Bed/chair alarms; Impulsive;Supervision on toilet/commode   Cognition   Overall Cognitive Status Impaired   Arousal/Participation Alert; Cooperative   Attention Attends with cues to redirect   Orientation Level Oriented to person;Oriented to place   Memory Decreased recall of precautions;Decreased recall of recent events;Decreased short term memory   Following Commands Follows one step commands without difficulty   Subjective   Subjective reports he feels he is doing better   QI: Sit to Stand   Assistance Needed Incidental touching   Sit to Stand CARE Score 4   QI: Chair/Bed-to-Chair Transfer   Assistance Needed Incidental touching   Chair/Bed-to-Chair Transfer CARE Score 4   Transfer Bed/Chair/Wheelchair   Limitations Noted In Balance; Coordination; Sequencing;Problem Solving   Adaptive Equipment Walker;Roller Walker   Stand Pivot Contact Guard   Sit to [employer]   Stand to W  AFUA [place], Chair, Wheelchair Transfer (FIM) 4 - Patient requires steadying assist or light touching   QI: Walk 10 Feet   Assistance Needed Incidental touching   Walk 10 Feet CARE Score 4   QI: Walk 50 Feet with Two Turns   Assistance Needed Incidental touching   Walk 50 Feet with Two Turns CARE Score 4   QI: Walk 150 Feet   Assistance Needed Incidental touching   Walk 150 Feet CARE Score 4   Ambulation   Does the patient walk? 2  Yes   Primary Discharge Mode of Locomotion Walk   Walk Assist Level Contact Guard   Gait Pattern Inconsistant Prachi;Decreased foot clearance;R foot drag; Forward Flexion; Lateral deviation; Wide JOSE DAVID;Step through; Improper weight shift   Assist Device Adalid Church Walked (feet) 150 ft  (x3)   Limitations Noted In Balance; Coordination; Heel Strike;Posture;Swing;Sequencing; Safety   Findings gait belt   Walking (FIM) 4 - Patient requires steadying assist or light touching AND distance 150 feet or more, no rest   Wheelchair mobility   QI: Does the patient use a wheelchair? 0  No   Therapeutic Interventions   Flexibility hamstring and gastroc 60"x2   Equipment Use   NuStep 8 mins level 2   Body Weight Support System 400'x5   Assessment   Treatment Assessment session focused on amb in BWSS with no AD, amb with RW not in BWSS, and amb with HHA on L not in BWSS; pt needs to slow down during all mobility due to poor balance creating unsafe situations; in BWS he deviates to the R and L and leans forward, loses balance completely on turns and needs constant VC to slow down; amb with RW reveals that he walks to the R of the walker and has it too far ahead; demonstration and VC did not improve his form; he is very impulsive with movements for xfers and often leaves his walker and reaches for things out of his JOSE DAVID; continue POC as per PT   Problem List Decreased range of motion;Decreased endurance; Impaired balance;Decreased mobility; Decreased coordination;Decreased cognition; Impaired judgement;Decreased safety awareness; Obesity   Barriers to Discharge Inaccessible home environment;Decreased caregiver support   PT Barriers   Functional Limitation Walking;Stair negotiation;Car transfers; Ramp negotiation;Standing;Transfers   Plan   Treatment/Interventions ADL retraining;Functional transfer training;Elevations; Therapeutic exercise; Endurance training;LE strengthening/ROM; Cognitive reorientation;Patient/family training;Bed mobility;Gait training   Progress Progressing toward goals   Recommendation   Recommendation 24 hour supervision/assist;Outpatient PT; Home with family support   Equipment Recommended Walker   PT Therapy Minutes   PT Time In 1430   PT Time Out 1538   PT Total Time (minutes) 68   PT Mode of treatment - Individual (minutes) 68   PT Mode of treatment - Concurrent (minutes) 0   PT Mode of treatment - Group (minutes) 0   PT Mode of treatment - Co-treat (minutes) 0   PT Mode of Teatment - Total time(minutes) 68 minutes   Therapy Time missed   Time missed?  No

## 2018-08-06 NOTE — PROGRESS NOTES
Internal Medicine Progress Note  Patient: Antonio Sheffield  Age/sex: 62 y o  male  Medical Record #: [id number]      ASSESSMENT/PLAN:  Antonio Sheffield is seen and examined and mangement for following issues:    # L CVA:  S/p tPA w/small ICH post infusion  Continue ASA/Statin as well as aggressive lifestyle modifications; aggressive therapy per PMR  Pt developed HA and dizziness in OT yesterday  BP checked and was elevated  Neuro exam unchanged at the time of these symptoms  Head CT repeated and was unchanged  Outpt Neurology follow-up       # DM II:  Previously uncontrolled d/t non compliance - HA1C 7 5; will continue Novolog 70/30 35U 2x daily  Continue SSI and accuchecks  Adjust medications as needed  Need to keep much tighter control - was discussed with pt's wife  BS: 170 198 208 144     # CKD III:  Previous baseline was 1 8-2 2  Currently off ACE inhibitor  Renal following  Cr improved with IV fluids to 2 32      # HTN:  Cont Coreg 6 25 mg 2 times daily; if creatinine improves would add back aCE  Blood pressure currently stable off ACEI      # h/o spinal stimulator:  Stable     # Non compliance:  Cont to stress the importance of compliance      Subjective: Patient seen and examined  Pt states he is doing well  Headache has resolved  He denies any other complaints      ROS:   GI: denies abdominal pain, change bowel habits or reflux symptoms  Neuro: No new neurologic changes  Respiratory: No Cough, SOB  Cardiovascular: No CP, palpitations     Scheduled Meds:    Current Facility-Administered Medications:  acetaminophen 650 mg Oral Q6H PRN Gregorio Israel MD   Isabell Cuff ON 8/7/2018] amLODIPine 5 mg Oral Daily Migdalia Macias MD   aspirin 81 mg Oral Daily Gregorio Israel MD   atorvastatin 80 mg Oral Daily With Jomarie Saint, MD   carvedilol 6 25 mg Oral BID With Meals Migdalia Macias MD   famotidine 20 mg Oral Daily Gregorio Israel MD   gabapentin 600 mg Oral Daily Gregorio Israel MD   insulin aspart protamine-insulin aspart 35 Units Subcutaneous BID AC Mike Arias MD   insulin lispro 1-5 Units Subcutaneous TID AC Mike Arias MD   insulin lispro 1-5 Units Subcutaneous HS Mike Arias MD   polyethylene glycol 17 g Oral Daily PRN Mike Arias MD       Labs:       Results from last 7 days  Lab Units 08/06/18  0521 08/01/18  0515   WBC Thousand/uL 8 33 7 75   HEMOGLOBIN g/dL 12 2 12 4   HEMATOCRIT % 38 2 37 7   PLATELETS Thousands/uL 156 146*       Results from last 7 days  Lab Units 08/06/18  0521 08/05/18  0535   SODIUM mmol/L 142 141   POTASSIUM mmol/L 4 5 4 5   CHLORIDE mmol/L 109* 109*   CO2 mmol/L 26 25   BUN mg/dL 31* 30*   CREATININE mg/dL 2 32* 2 49*   GLUCOSE RANDOM mg/dL 106 123   CALCIUM mg/dL 9 0 8 7                  Glucose (mg/dL)   Date Value   08/06/2018 106   08/05/2018 123   08/04/2018 122   08/03/2018 102   01/03/2016 189 (H)   01/02/2016 178 (H)   01/01/2016 113   12/31/2015 128     Glucose, i-STAT (mg/dl)   Date Value   07/29/2018 213 (H)       Labs reviewed    Physical Examination:  Vitals:   Vitals:    08/05/18 1341 08/05/18 1424 08/05/18 2045 08/06/18 0602   BP: 163/81 153/77 145/79 167/77   BP Location: Left arm Left arm Left arm Left arm   Pulse: 65 68 68 73   Resp: 18  18    Temp: 98 °F (36 7 °C)  97 5 °F (36 4 °C) 97 8 °F (36 6 °C)   TempSrc: Oral  Oral Oral   SpO2: 95% 98% 95% 97%   Weight:       Height:           PERRLA EOMI no JVD  RESP: CTAB, no R/R/W  CV: +S1 S2, regular rate, no rubs  ABD: soft, NT, ND, normal BS   EXT:  No edema  Neuro:  Alert and oriented x3  Gravelly voice  Right facial weakness  4/5 strength in the right upper and lower extremity  [ X ] Total time spent: 30 Mins and greater than 50% of this time was spent counseling/coordinating care  ** Please Note: Dragon 360 Dictation voice to text software may have been used in the creation of this document   **

## 2018-08-06 NOTE — PROGRESS NOTES
18 1030   Pain Assessment   Pain Assessment 0-10   Pain Score 3   Pain Type Acute pain   Pain Location Head   Hospital Pain Intervention(s) Distraction;Repositioned  (pt declined medication)   Response to Interventions pt tolerated session with brief rest breaks   Restrictions/Precautions   Precautions Cognitive;Bed/chair alarms; Fall Risk;Impulsive;Supervision on toilet/commode   Memory Skills   Memory (FIM) 2 - Recognizes, recalls/performs 25-49%   Social Interaction (FIM) 4 - Needs redirecting for appropriate language or to initiate interaction   Speech/Language/Cognition Assessmetn   Treatment Assessment Pt participated in skilled ST session w/ focus on cognitive linguistic skills where pt's wife present for second half of session  To begin session, pt demonstrated improvement in [facility] recall to include accurate recall of bfast items  Min verbal cues required to improve recall of visitors from last evening, noting pt to have difficulty w/ word retrieval of his son's finance's name  Targeting LTM recall of biographical info, pt accurately stated his age,  and address  Decreased recall of the ages of his children, along w/ his wedding anniversary date and numbers of years   Required max assist to state the ages of his children  Targeting visual memory, pt presented w/ a picture scene and recalled info w/ 3/11 accuracy, benefiting from max cues to increase recall  Pt then presented w/ situations and instructed to determine causes based on given results where pt was 14 accurate w/ task, requiring min-mod cues to improve responses  When completing a 4-step written sequencing task of common activities, pt required consistent verbal cues for pacing to improve comprehension and attention to detail  Overall mod assist requiring for problem solving/sequencing portion of task      SLP Therapy Minutes   SLP Time In 9590   SLP Time Out 1130   SLP Total Time (minutes) 60   SLP Mode of treatment - Individual (minutes) 60   SLP Mode of treatment - Concurrent (minutes) 0   SLP Mode of treatment - Group (minutes) 0   SLP Mode of treatment - Co-treat (minutes) 0   SLP Mode of Teatment - Total time(minutes) 60 minutes   Therapy Time missed   Time missed?  No   Daily FIM Score   Problem solving (FIM) 2 - Needs direction more than ½ time to initiate, plan or complete simple tasks   Comprehension (FIM) 4 - Understands basic info/conversation 75-90% of time   Expression (FIM) 4 - Expresses basic info/needs 75-90% of time

## 2018-08-06 NOTE — PROGRESS NOTES
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   QI: Roll Left and Right   Assistance Needed Supervision   Roll Left and Right CARE Score 4   QI: Sit to [address] to Lying CARE Score 4   QI: Lying to Sitting on Side of Bed   Assistance Needed Supervision   Lying to Sitting on Side of Bed CARE Score 4   QI: Sit to Stand   Assistance Needed Incidental touching   Sit to Stand CARE Score 4   Bed Mobility   Findings S for All   QI: Chair/Bed-to-Chair Transfer   Assistance Needed Physical assistance   Assistance Provided by [place] Less than 25%   Chair/Bed-to-Chair Transfer CARE Score 3   Transfer Bed/Chair/Wheelchair   Limitations Noted In Balance; Coordination;Problem Solving; Sequencing   Adaptive Equipment Roller Walker   Stand Pivot Contact Guard   Sit to [employer]   Stand to [employer]   Supine to [employer]   Sit to Supine Supervision   Bed, Chair, Wheelchair Transfer (FIM) 4 - Patient requires steadying assist or light touching   QI: Walk 10 Feet   Assistance Needed Incidental touching   Walk 10 Feet CARE Score 4   QI: Walk 50 Feet with Two Turns   Assistance Needed Incidental touching   Walk 50 Feet with Two Turns CARE Score 4   QI: Walk 150 Feet   Assistance Needed Incidental touching   Walk 150 Feet CARE Score 4   Ambulation   Does the patient walk? 2   Yes   Primary Discharge Mode of Locomotion Walk Walk Assist Level Contact Guard   Gait Pattern Inconsistant Prachi;Decreased foot clearance;R foot drag;Lateral deviation; Wide JOSE DAVID;Step through; Improper weight shift   Assist Device Hand Hold   Distance Walked (feet) 150 ft   Limitations Noted In Balance; Coordination; Heel Strike; Sequencing;Speed;Swing   Findings gait belt   Walking (FIM) 4 - Patient requires steadying assist or light touching AND distance 150 feet or more, no rest   Wheelchair mobility   QI: Does the patient use a wheelchair? 0  No   Therapeutic Interventions   Strengthening bridges 15x2; SLR 10x2; adduction yellow ball 10x2; abduction green TB 10x2;    Neuromuscular Re-Education amb no AD, HHA   Assessment   Treatment Assessment session focused on strengthening the pts hip muscles for increased stabilization; pt has poor glute strength for bridges and tries to rush through the exercises due to poor strength; pt needs VC to slow down for all activities and is slightly impulsive with xfers; leaves the walker to the side and sits quickly in chairs without actually turning around; amb with no AD and HHA on the R with lateral instability and deviations in both directions; pt has decreased insight into deficits and 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; continue POC as per PT   Problem List Decreased range of motion; Impaired balance;Decreased mobility; Decreased coordination; Impaired judgement;Decreased safety awareness;Decreased cognition;Obesity   PT Barriers   Functional Limitation Walking   Plan   Treatment/Interventions ADL retraining;Functional transfer training;LE strengthening/ROM; Elevations; Therapeutic exercise; Endurance training;Patient/family training;Bed mobility;Gait training   Progress Progressing toward goals   Recommendation   Recommendation 24 hour supervision/assist;Home with family support; Outpatient PT   Equipment Recommended Walker   PT Therapy Minutes   PT Time In 0930   PT Time Out 1000   PT Total Time (minutes) 30   PT Mode of treatment - Individual (minutes) 30   PT Mode of treatment - Concurrent (minutes) 0   PT Mode of treatment - Group (minutes) 0   PT Mode of treatment - Co-treat (minutes) 0   PT Mode of Teatment - Total time(minutes) 30 minutes   Therapy Time missed   Time missed?  No

## 2018-08-07 LAB
ANION GAP SERPL CALCULATED.3IONS-SCNC: 6 MMOL/L (ref 4–13)
BUN SERPL-MCNC: 30 MG/DL (ref 5–25)
CALCIUM SERPL-MCNC: 9 MG/DL (ref 8.3–10.1)
CHLORIDE SERPL-SCNC: 107 MMOL/L (ref 100–108)
CO2 SERPL-SCNC: 28 MMOL/L (ref 21–32)
CREAT SERPL-MCNC: 2.29 MG/DL (ref 0.6–1.3)
GFR SERPL CREATININE-BSD FRML MDRD: 30 ML/MIN/1.73SQ M
GLUCOSE SERPL-MCNC: 109 MG/DL (ref 65–140)
GLUCOSE SERPL-MCNC: 119 MG/DL (ref 65–140)
GLUCOSE SERPL-MCNC: 122 MG/DL (ref 65–140)
GLUCOSE SERPL-MCNC: 157 MG/DL (ref 65–140)
GLUCOSE SERPL-MCNC: 190 MG/DL (ref 65–140)
POTASSIUM SERPL-SCNC: 4.8 MMOL/L (ref 3.5–5.3)
SODIUM SERPL-SCNC: 141 MMOL/L (ref 136–145)

## 2018-08-07 PROCEDURE — 97116 GAIT TRAINING THERAPY: CPT

## 2018-08-07 PROCEDURE — 97127 HB COGNITIVE SKILLS DEVELOPMENT, EACH 15 MUNUTES: CPT

## 2018-08-07 PROCEDURE — 99232 SBSQ HOSP IP/OBS MODERATE 35: CPT | Performed by: INTERNAL MEDICINE

## 2018-08-07 PROCEDURE — 99232 SBSQ HOSP IP/OBS MODERATE 35: CPT | Performed by: PHYSICAL MEDICINE & REHABILITATION

## 2018-08-07 PROCEDURE — 97530 THERAPEUTIC ACTIVITIES: CPT

## 2018-08-07 PROCEDURE — 97535 SELF CARE MNGMENT TRAINING: CPT

## 2018-08-07 PROCEDURE — 97110 THERAPEUTIC EXERCISES: CPT

## 2018-08-07 PROCEDURE — 82948 REAGENT STRIP/BLOOD GLUCOSE: CPT

## 2018-08-07 PROCEDURE — G0515 COGNITIVE SKILLS DEVELOPMENT: HCPCS

## 2018-08-07 PROCEDURE — 80048 BASIC METABOLIC PNL TOTAL CA: CPT | Performed by: INTERNAL MEDICINE

## 2018-08-07 RX ADMIN — INSULIN LISPRO 1 UNITS: 100 INJECTION, SOLUTION INTRAVENOUS; SUBCUTANEOUS at 11:40

## 2018-08-07 RX ADMIN — ATORVASTATIN CALCIUM 80 MG: 80 TABLET, FILM COATED ORAL at 16:46

## 2018-08-07 RX ADMIN — ASPIRIN 81 MG: 81 TABLET, COATED ORAL at 08:37

## 2018-08-07 RX ADMIN — BACITRACIN, NEOMYCIN, POLYMYXIN B 1 SMALL APPLICATION: 400; 3.5; 5 OINTMENT TOPICAL at 09:10

## 2018-08-07 RX ADMIN — INSULIN ASPART 35 UNITS: 100 INJECTION, SUSPENSION SUBCUTANEOUS at 16:46

## 2018-08-07 RX ADMIN — INSULIN LISPRO 1 UNITS: 100 INJECTION, SOLUTION INTRAVENOUS; SUBCUTANEOUS at 16:47

## 2018-08-07 RX ADMIN — ACETAMINOPHEN 650 MG: 325 TABLET, FILM COATED ORAL at 16:03

## 2018-08-07 RX ADMIN — AMLODIPINE BESYLATE 5 MG: 5 TABLET ORAL at 08:36

## 2018-08-07 RX ADMIN — FAMOTIDINE 20 MG: 20 TABLET ORAL at 08:36

## 2018-08-07 RX ADMIN — INSULIN ASPART 35 UNITS: 100 INJECTION, SUSPENSION SUBCUTANEOUS at 07:48

## 2018-08-07 RX ADMIN — CARVEDILOL 6.25 MG: 6.25 TABLET, FILM COATED ORAL at 16:46

## 2018-08-07 RX ADMIN — GABAPENTIN 600 MG: 300 CAPSULE ORAL at 08:37

## 2018-08-07 NOTE — PROGRESS NOTES
08/07/18 1310   Pain Assessment   Pain Assessment 0-10   Pain Score Worst Possible Pain   [place] FACES Pain Rating 4   Pain Location Head   Pain Orientation Posterior   Pain Descriptors [facility] Pain Intervention(s) Repositioned; Ambulation/increased activity; Distraction   Response to Interventions tolerated session- states 10/10 pain in the back of the head but had no complaints prior to asking about pain     Restrictions/Precautions   Precautions Bed/chair alarms;Cognitive; Fall Risk;Impulsive;Supervision on toilet/commode   Weight Bearing Restrictions No   ROM Restrictions No   Cognition   Overall Cognitive Status Impaired   Arousal/Participation Alert; Cooperative   Attention Attends with cues to redirect   Orientation Level Oriented to person;Oriented to place   Memory Decreased recall of precautions;Decreased recall of recent events;Decreased short term memory   Following Commands Follows one step commands without difficulty   Subjective   Subjective pt and wife are agreeable to continuing therapy session   QI: Sit to Stand   Assistance Needed Incidental touching   Comment CG with RW    Sit to Stand CARE Score 4   QI: Chair/Bed-to-Chair Transfer   Assistance Needed Incidental touching   Comment CG with VC for safety    Chair/Bed-to-Chair Transfer CARE Score 4   Transfer Bed/Chair/Wheelchair   Limitations Noted In Coordination;Problem Solving;Balance; Sequencing;UE Strength;LE Strength   Adaptive Equipment Roller Walker   Stand Pivot Contact Guard  (verbal cues )   Sit to [employer]   Stand to [employer]   Findings pt transfers with CG-CS, requires consistent cueing for safety and hand placement    Bed, Chair, Wheelchair Transfer (FIM) 4 - Patient requires steadying assist or light touching   QI: Walk 10 Feet   Assistance Needed Incidental touching   Walk 10 Feet CARE Score 4   QI: Walk 50 Feet with Two Turns   Assistance Needed Incidental touching   Walk 50 Feet with Two Turns CARE Score 4   QI: Walk 150 Feet   Assistance Needed Incidental touching   Walk 150 Feet CARE Score 4   Ambulation   Does the patient walk? 2  Yes   Primary Discharge Mode of Locomotion Walk   Walk Assist Level Contact Guard   Gait Pattern Inconsistant Prachi;Decreased foot clearance; Lateral deviation; Wide JOSE DAVID; Improper weight shift   Assist Device Bettyeer Dorothy Church Walked (feet) 150 ft  (x2)   Limitations Noted In Balance; Coordination; Endurance;Posture; Sequencing; Safety   Findings pt ambulated 150ft with RW and CGA provided by wife and PT for safety    Walking (FIM) 4 - Patient requires steadying assist or light touching AND distance 150 feet or more, no rest   Wheelchair mobility   QI: Does the patient use a wheelchair? 0  No   QI: 4 Steps   Assistance Needed Incidental touching   Comment cues for sequencing   4 Steps CARE Score 4   Stairs   Type Stairs  ()   # of Steps 4   Weight Bearing Precautions Fall Risk   Assist Devices Bilateral Rail   Findings 4  steps with CG and cues for sequencing    Stairs (FIM) 4 - Patient requires steadying assist or light touching AND patient goes up and down full flight (12- 14 stairs)   Equipment Use   viVoodep lvl 2 [id number]    Assessment   Treatment Assessment PT session focused on ambulation, stair negotiation and endurance  Pt wife present for session and provided CG assistance when pt ambulated 150ft with RW- PT also provided CG for safety  Pt was able to ascend/descend 4  stairs with CG and cues for proper sequencing  Pt has complaints of 10/10 head pain mid-session, but had no complaints prior to being asked about pain  Pt continues to demonstrate impulsivity and requries very firm and simple commands for safety  Pt wife was receptive to training- will continue hands on training for ambulation   PT to continue focus on balance, ambulation, stair negotiation, improving safety awareness and transfers to decrease burden of caregiver at d/c  Family/Caregiver Present Inocencia Sesay    PT Family training done with: Wife is ok to transfer patient bed<>chair, but not for toileting ror ambulation at this time- RN aware    Problem List Decreased range of motion;Decreased endurance; Impaired balance;Decreased mobility; Decreased coordination;Decreased cognition; Impaired judgement;Decreased safety awareness; Obesity   Barriers to Discharge Inaccessible home environment;Decreased caregiver support   PT Barriers   Physical Impairment Impaired balance;Decreased strength;Decreased endurance;Decreased cognition; Impaired judgement;Decreased safety awareness;Decreased coordination;Obesity; Impaired vision   Functional Limitation Walking;Stair negotiation;Car transfers; Ramp negotiation;Standing;Transfers   Plan   Treatment/Interventions Patient/family training; Therapeutic exercise;LE strengthening/ROM   Progress Progressing toward goals   Recommendation   Recommendation 24 hour supervision/assist;Outpatient PT; Home with family support   Equipment Recommended Walker   PT - OK to Discharge No   PT Therapy Minutes   PT Time In 1310   PT Time Out 1400   PT Total Time (minutes) 50   PT Mode of treatment - Individual (minutes) 20   PT Mode of treatment - Concurrent (minutes) 30   PT Mode of treatment - Group (minutes) 0   PT Mode of treatment - Co-treat (minutes) 0   PT Mode of Teatment - Total time(minutes) 50 minutes   Therapy Time missed   Time missed?  No

## 2018-08-07 NOTE — PROGRESS NOTES
Internal Medicine Progress Note  Patient: Karla Mandujano  Age/sex: 62 y o  male  Medical Record #: [id number]      ASSESSMENT/PLAN:  Karla Mandujano is seen and examined and mangement for following issues:    # L CVA:  S/p tPA w/small ICH post infusion  Continue ASA/Statin as well as aggressive lifestyle modifications; aggressive therapy per PMR  Outpt Neurology follow-up       # DM II:  Previously uncontrolled d/t non compliance - HA1C 7 5; will continue Novolog 70/30 35U 2x daily  Continue SSI and accuchecks  Adjust medications as needed  Need to keep much tighter control - was discussed with pt's wife  BS: 144 152 126 144     # CKD III:  Previous baseline was 1 8-2 2  Currently off ACE inhibitor  Renal following  Cr improved with IV fluids to 2 32      # HTN:  Cont Coreg 6 25 mg 2 times daily  Renal added amlodipine 5mg yesterday      # h/o spinal stimulator:  Stable     # Non compliance:  Cont to stress the importance of compliance      Subjective: Patient seen and examined  Pt states he is doing well  He denies any other complaints      ROS:   GI: denies abdominal pain, change bowel habits or reflux symptoms  Neuro: No new neurologic changes  Respiratory: No Cough, SOB  Cardiovascular: No CP, palpitations     Scheduled Meds:    Current Facility-Administered Medications:  acetaminophen 650 mg Oral Q6H PRN Maria Elena Iniguez MD   amLODIPine 5 mg Oral Daily Marybeth Montenegro MD   aspirin 81 mg Oral Daily Maria Elena Iniguez MD   atorvastatin 80 mg Oral Daily With Roxbhakti Wall MD   carvedilol 6 25 mg Oral BID With Meals Marybeth Montenegro MD   famotidine 20 mg Oral Daily Maria Elena Iniguez MD   gabapentin 600 mg Oral Daily Maria Elena Iniguez MD   insulin aspart protamine-insulin aspart 35 Units Subcutaneous BID AC Maria Elena Iniguez MD   insulin lispro 1-5 Units Subcutaneous TID AC Maria Elena Iniguez MD   insulin lispro 1-5 Units Subcutaneous HS Maria Elena Iniguez MD   neomycin-bacitracin-polymyxin b 1 small application Topical BID Karen ALLISON Taylor   polyethylene glycol 17 g Oral Daily PRN Basil Cosme MD       Labs:       Results from last 7 days  Lab Units 08/06/18  0521 08/01/18  0515   WBC Thousand/uL 8 33 7 75   HEMOGLOBIN g/dL 12 2 12 4   HEMATOCRIT % 38 2 37 7   PLATELETS Thousands/uL 156 146*       Results from last 7 days  Lab Units 08/06/18  0521 08/05/18  0535   SODIUM mmol/L 142 141   POTASSIUM mmol/L 4 5 4 5   CHLORIDE mmol/L 109* 109*   CO2 mmol/L 26 25   BUN mg/dL 31* 30*   CREATININE mg/dL 2 32* 2 49*   GLUCOSE RANDOM mg/dL 106 123   CALCIUM mg/dL 9 0 8 7                  Glucose (mg/dL)   Date Value   08/06/2018 106   08/05/2018 123   08/04/2018 122   08/03/2018 102   01/03/2016 189 (H)   01/02/2016 178 (H)   01/01/2016 113   12/31/2015 128     Glucose, i-STAT (mg/dl)   Date Value   07/29/2018 213 (H)       Labs reviewed    Physical Examination:  Vitals:   Vitals:    08/06/18 0602 08/06/18 1312 08/06/18 2032 08/07/18 0549   BP: 167/77 152/92 167/80 152/76   BP Location: Left arm Right arm Left arm Left arm   Pulse: 73 73 70 67   Resp:  18 16 20   Temp: 97 8 °F (36 6 °C)  97 5 °F (36 4 °C) 97 8 °F (36 6 °C)   TempSrc: Oral  Oral Oral   SpO2: 97% 94% 94% 95%   Weight:       Height:           PERRLA EOMI no JVD  RESP: CTAB, no R/R/W  CV: +S1 S2, regular rate, no rubs  ABD: soft, NT, ND, normal BS   EXT:  No edema  Neuro:  Alert and oriented x3  Gravelly voice  Right facial weakness  4/5 strength in the right upper and lower extremity  [ X ] Total time spent: 30 Mins and greater than 50% of this time was spent counseling/coordinating care  ** Please Note: Dragon 360 Dictation voice to text software may have been used in the creation of this document   **

## 2018-08-07 NOTE — PCC SPEECH THERAPY
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  Pt continues to demonstrate mild word finding deficits in basic conversational speech as well as in structured tasks  Overall insight to deficits was decreased, noting most impairment w/ STM recall, executive function tasks (sequencing, problem solving, organization)  Pt's wife has been present and educated on current deficits and need to maximize skills at this time  Additionally, recommendations are for supervision/assistance upon d/c home at this time as well as pending progress in therapy for further recommendations  Will continue to benefit from SLP services at this time to maximize overall cognitive linguistic skills at this time

## 2018-08-07 NOTE — PCC CARE MANAGEMENT
Patient participating in therapy and family would like to take him home at discharge  Team is recommending supervision at present and wife will be available till the end of August and then patient's 4 children will alternate schedules that he has supervision 24/7  CM will follow to assist with dc needs

## 2018-08-07 NOTE — CONSULTS
Consultation - Neuropsychology    Gregorio Woodall 62 y o  male MRN: [id number]  Unit/Bed#: -08 Encounter: [id number]    Assessment/Plan     Assessment:  Pt denied history of major depression, anxiety or other mental health concerns  Psychosocial stressors include: experience of CVA and related symptoms; loss of independence, mild aphasia, being away from his family  Overall, pt is currently coping with their medical issues effectively and is adjusting to rehab needs  Pt reported being more tearful and emotional since experiencing the stroke; psychoeducation related to post stroke depression and mood disturbance, and pseudobulbar affect provided  Family and social support includes: pt wife, children, numerous extended family members and friends  Pt reported motivation to participate in rehab program and work on rehab goals  Dx: F43 23 Adjustment disorder with depressed and anxious mood  Code: [id number]    Plan:   Pt will be afforded rehab psychology services while at [facility] to help pt cope with illness  History of Present Illness   Physician Requesting Consult: Cathy Lepe MD  Reason for Consult / Principal Problem: coping, adjustment  Patient is a 62 y o  male  Primary complaints include: anxiety, concern about health problems, fearfulness, feeling depressed, poor concentration and tearfulness  Psychosocial Stressors: health  Consults    Psychiatric Review Of Systems:  sleep: no  appetite changes: no  weight changes: no  energy/anergy: yes  interest/pleasure/anhedonia: no  somatic symptoms: yes  anxiety/panic: yes  delmar: no  guilty/hopeless: no  self injurious behavior/risky behavior: no    Historical Information   Past Psychiatric History:   None    Substance Abuse History:  Use of Alcohol: denied and 0 out of 4 on CAGE    Smoking history: none noted  Family Psychiatric History: none noted       Social History  Education: high school diploma/GED  Marital history:   Living arrangement, social support: The patient lives in home with wife  Occupational History: full time  Functioning Relationships: good support system, good relationship with spouse or significant other, gets along well with co-workers and good relationship with children  Other Pertinent History: None    Traumatic History:   Abuse: none  Other Traumatic Events: none noted      Past Medical History:   Diagnosis Date    [facility] mellitus ([facility] )     Hypercholesteremia     Hyperlipidemia     Hypertension     Neuropathy     Obesity     Osteomyelitis ([facility] )     last assessed 11/4/16    PVC's (premature ventricular contractions)     sees cardiology Dr Ellen Montana:  Review of Systems    Meds/Allergies   current meds:   Current Facility-Administered Medications   Medication Dose Route Frequency    acetaminophen (TYLENOL) tablet 650 mg  650 mg Oral Q6H PRN    amLODIPine (NORVASC) tablet 5 mg  5 mg Oral Daily    aspirin (ECOTRIN LOW STRENGTH) EC tablet 81 mg  81 mg Oral Daily    atorvastatin (LIPITOR) tablet 80 mg  80 mg Oral Daily With Dinner    carvedilol (COREG) tablet 6 25 mg  6 25 mg Oral BID With Meals    famotidine (PEPCID) tablet 20 mg  20 mg Oral Daily    gabapentin (NEURONTIN) capsule 600 mg  600 mg Oral Daily    insulin aspart protamine-insulin aspart (NovoLOG 70/30) 100 units/mL subcutaneous injection 35 Units  35 Units Subcutaneous BID AC    insulin lispro (HumaLOG) 100 units/mL subcutaneous injection 1-5 Units  1-5 Units Subcutaneous TID AC    insulin lispro (HumaLOG) 100 units/mL subcutaneous injection 1-5 Units  1-5 Units Subcutaneous HS    neomycin-bacitracin-polymyxin b (NEOSPORIN) ointment 1 small application  1 small application Topical BID    polyethylene glycol (MIRALAX) packet 17 g  17 g Oral Daily PRN     No Known Allergies    Objective   Vital signs in last 24 hours:  Temp:  [97 5 °F (36 4 °C)-97 8 °F (36 6 °C)] 97 8 °F (36 6 °C)  HR:  [67-70] 69  Resp:  [16-20] 20  BP: (118-167)/(66-83) 154/83      Intake/Output Summary (Last 24 hours) at 08/07/18 1514  Last data filed at 08/07/18 1214   Gross per 24 hour   Intake              650 ml   Output                0 ml   Net              650 ml       Mental Status Evaluation:  Appearance:  age appropriate and overweight   Behavior:  normal   Speech:  normal pitch and normal volume   Mood:  sad   Affect:  normal   Language: aphasia  Yes mild aphasia   Thought Process:  goal directed and logical   Thought Content:  normal   Perceptual Disturbances: None   Risk Potential: none   Sensorium:  person, place and situation   Cognition:  grossly intact   Consciousness:  alert    Attention: attention span and concentration were age appropriate   Intellect: within normal limits   Fund of Knowledge: vocabulary: WNL   Insight:  age appropriate   Judgment: age appropriate   Muscle Strength and Tone: see PT eval   Gait/Station: see PT eval   Motor Activity: no abnormal movements

## 2018-08-07 NOTE — NUTRITION
08/07/18 1615   Recommendations/Interventions   Interventions Diet: order adjustment  (Suggest diet change to CCD-1 Step I)   Pt and wife agree with diet change  Previous diet ed provided, will revisit to reinforce diet ed, answer questions   Pt will need to order his own meals to reinforce understanding of diet guidelines

## 2018-08-07 NOTE — PCC OCCUPATIONAL THERAPY
Pt is demonstrating good progress with occupational therapy and is progressing toward mod independent/indendent level goals for ADL, IADL, and functional transfers/mobility  Pt continues to present with impairments in activity tolerance, endurance, standing balance/tolerance, UE ROM, FMC, memory, insight, safety , judgement , attention , sequencing , visual perceptual skills  and depth perception   Occupational performance remains limited by fatigue, impulsivity, (R) UE dysmetria, diplopia, nystagmus and (L) visual deficits   Eye Patch has improved acuity at this time  Pt reports that he has his eyes drained and has not been able get it done since admission  Pt will continue to benefit from skilled acute rehab OT services to address above mentioned barriers and maximize functional independence in baseline areas of occupation to meet established treatment goals with overall decreased burden of care

## 2018-08-07 NOTE — PROGRESS NOTES
Physical Medicine and Rehabilitation Progress Note  Michael Rosen 62 y o  male MRN: [id number]  Unit/Bed#: -79 Encounter: [id number]    HPI: Michael Rosen is a 62 y o  male who presented to the [facility] with Rt sided weakness, Rt facial droop, and slurred speech  Patient was given TPA and responded well and does not currently have paresis, slurred speech, or facial droop       Subjective: Patient denies any events overnight         ROS:  Negative except as oultined above     Assessment/Plan:      CKD (chronic kidney disease)   Assessment & Plan    Baseline Cr appears to be 2 0-2 25, renal service managing as inpt (follows with Dr Keren Hernandez as OP), currently 2 29        Elevated alkaline phosphatase level   Assessment & Plan    Chronically elevated going back 1 year, currently mildly elevated at 133 ()         Cognitive impairment   Assessment & Plan    Per family present x 1 year, OP FU [facility] for further testing/management         Neuropathy   Assessment & Plan    Likely 2/2 to DM, on home neurontin 600 qd        Essential hypertension   Assessment & Plan    Home coreg increased from 3 125 to 6 25 mg BID  On home norvasc 5 mg qd         Hyperlipidemia   Assessment & Plan    Home lovastatin 20 mg qpm changed to lipitor 80 mg qpm per neuro for CVA        Type 2 diabetes mellitus with hyperglycemia (HCC)   Assessment & Plan    On home novolog 70/30 35 units BID        * Ischemic cerebrovascular accident (CVA) ([facility] [address] )   Assessment & Plan    On home ASA 81 mg qd  Home lovastatin 20 mg qpm changed to lipitor 80 mg qpm per neuro           Scheduled Meds:    Current Facility-Administered Medications:  acetaminophen 650 mg Oral Q6H PRN Floyd Rosa MD   amLODIPine 5 mg Oral Daily Jessica Boyce MD   aspirin 81 mg Oral Daily Floyd Rosa MD   atorvastatin 80 mg Oral Daily With Teresa Gomez MD   carvedilol 6 25 mg Oral BID With [facility] Angelia Serrano MD   famotidine 20 mg Oral Daily Marlene Witt MD   gabapentin 600 mg Oral Daily Marlene Witt MD   insulin aspart protamine-insulin aspart 35 Units Subcutaneous BID AC Marlene Witt MD   insulin lispro 1-5 Units Subcutaneous TID AC Marlene Witt MD   insulin lispro 1-5 Units Subcutaneous HS Marlene Witt MD   neomycin-bacitracin-polymyxin b 1 small application Topical BID Karen Taylor PA-C   polyethylene glycol 17 g Oral Daily PRN Marlene Witt MD        DVT ppx: SCDs b/l, patient ambulating sufficient distances     Will defer to PCP with further testing/treatment and/or specialist referral at PCP's discretion      Incidental findings noted on CT A/P 8/2016:  1) Rt renal cysts  2) cirrhosis/portal hypertension  3) splenomegaly     Other incidental findings:  1) grade I DD  2) Rt carotid and B/L vertebral artery stenosis    Objective:    Functional Update:  Mobility: min   Transfers: cg-sup  ADLs: mod     Allergies per EMR    Physical Exam:    T: 97 8  HR: 70  BP: 140/75  RR: 16 (not 20)  POx: 95%      General: alert, no apparent distress, cooperative and comfortable  HEENT:  Head: Normocephalic, no lesions, without obvious abnormality  CARDIAC:  +s1/2  LUNGS:  respirations unlabored   ABDOMEN:  soft NT   EXTREMITIES:  no significant LE edema   NEURO:   awake, alert, approriately answering questions   PSYCH:  mood/affect currently stable       Diagnostic Studies: reviewed, no new imaging  Ct Head Wo Contrast    Result Date: 8/5/2018  Impression: Expected evolution of the known ischemic infarct in the left basal ganglion since prior CT  No hemorrhagic conversion  No new ischemic infarcts are seen  No new intraparenchymal hemorrhages are seen  No hydrocephalus   Workstation performed: [id number]       Laboratory:      Results from last 7 days  Lab Units 08/06/18  0521 08/01/18  0515   HEMOGLOBIN g/dL 12 2 12 4   HEMATOCRIT % 38 2 37 7   WBC Thousand/uL 8 33 7 75       Results from last 7 days  Lab Units 08/07/18  0555 08/06/18  0521 08/05/18  0535   BUN mg/dL 30* 31* 30*   SODIUM mmol/L 141 142 141   POTASSIUM mmol/L 4 8 4 5 4 5   CHLORIDE mmol/L 107 109* 109*   GLUCOSE RANDOM mg/dL 109 106 123   CREATININE mg/dL 2 29* 2 32* 2 49*            Patient Active Problem List   Diagnosis    Type 2 diabetes mellitus with hyperglycemia (HCC)    Hyperlipidemia    Essential hypertension    [facility] ([facility]) [facility] [place]    Neuropathy    Cognitive impairment    Elevated alkaline phosphatase level    CKD (chronic kidney disease)           Total time spent: Minimum 25 minutes was spent face-to-face with the patient

## 2018-08-07 NOTE — PROGRESS NOTES
18 0900   Pain Assessment   Pain Assessment No/denies pain   Restrictions/Precautions   Precautions Bed/chair alarms;Cognitive; Fall Risk;Supervision on toilet/commode   Memory Skills   Memory (FIM) 2 - Recalls 1 of 2 steps   Social Interaction (FIM) 5 - Interacts appropriately with others 90% of time   Speech/Language/Cognition Assessmetn   Treatment Assessment Session engaged in review of LTM biographical information, which pt was able to recall address, age, , wife and children's names, along w/ word history  Of note, when engaging in conversation in regards to work history, pt was noted to demonstrate mild word finding deficits at this time  When engaging in orientation questions, pt was oriented to self and place only  Required binary choices for situation, year and moderate cues for month  Pt was given the entire date of 2018, which pt was able to repeat appropriately, but unable to recall later in session  Pt c/o L eye "discomfort" which per description of pt, L eye is "fuzzy" but he was noted to perseverate on eye and letting the doctor know about the "fuzzy" feeling to the MD  Pt was able to engage  in auditory categorization tasks w/ pt  When completing concrete convergent categories, pt was 8/12 accurate w/ task, increasing to 9/12 given phonemic cues and increased to 11/12 given semantic cues  When engaging in divergent categories, pt was 14/15 accurate, needing semantic cue x1 to elicit remaining item  When providing the effects given a situation, pt was 11/14 accurate, noting mild perseveration on responses (again w/ mild word finding deficits) where pt was able to elicit appropriate answers w/ minimal prompts from SLP  Pt then completed visual scanning tasks, where he was able to complete number cancellation task w/ 12/12 accuracy; letter cancellation was 16/16 accurate   When engaged in trail making task given numbers from 1-10, pt was 10/10 accurate and lastly, when count the number of dots in a task, pt was 10/10 accurate  Engaged in auditory STM recall task given 4 words to recall by attribute inclusion  Pt was 6/12 accurate w/ task, noting improvement to 7/12 accuracy and then again, pt was 12/12 accurate when given repetition cues  Wife present for remaining 20-30 min of session, where she reports improvment w/ overall abilities today vs  yesterday  Lastly, pt completed mock check writing task, where pt was 8/15 accurate w/ task, noting decrased recall of date, as well as errored in writing out the number amounts in the correct places on the check  Additionally, pt required cues to sign checks as well  Will continue to benefit from SLP services at this time to maximize cognitive linguistic skills  SLP Therapy Minutes   SLP Time In 0900   SLP Time Out 1000   SLP Total Time (minutes) 60   SLP Mode of treatment - Individual (minutes) 60   SLP Mode of treatment - Concurrent (minutes) 0   SLP Mode of treatment - Group (minutes) 0   SLP Mode of treatment - Co-treat (minutes) 0   SLP Mode of Teatment - Total time(minutes) 60 minutes   Therapy Time missed   Time missed?  No   Daily FIM Score   Problem solving (FIM) 3 - Solves basic problmes 50-74% of time   Comprehension (FIM) 4 - Understands basic info/conversation 75-90% of time   Expression (FIM) 4 - Expresses basic info/needs 75-90% of time

## 2018-08-07 NOTE — PROGRESS NOTES
08/07/18 1230   Pain Assessment   Pain Assessment No/denies pain   [place] FACES Pain Rating 0   Restrictions/Precautions   Precautions Bed/chair alarms; Fall Risk;Impulsive;Supervision on toilet/commode;Cognitive   Cognition   Overall Cognitive Status Impaired   Arousal/Participation Alert; Cooperative   Attention Attends with cues to redirect   Orientation Level Oriented to person;Oriented to place   Memory Decreased recall of precautions;Decreased recall of recent events;Decreased short term memory   Following Commands Follows one step commands without difficulty   Subjective   Subjective pt and wife agreeable to have FT this PM in preparation for eye doctor appt this coming thursday at 3:30 pm     QI: Roll Left and Right   Assistance Needed Set-up / clean-up   Roll Left and Right CARE Score 5   QI: Sit to [address] / clean-up   Sit to Lying CARE Score 5   QI: Lying to Sitting on Side of Bed   Assistance Needed Set-up / clean-up   Lying to Sitting on Side of Bed CARE Score 5   QI: Sit to Stand   Assistance Needed Incidental touching;Verbal cues   Comment CS-CG with ~80% cues for hand placement  posted a reminder on pt's w/c on technique to facilitate carry over   Sit to Stand CARE Score 4   Bed Mobility   Findings S   QI: Chair/Bed-to-Chair Transfer   Assistance Needed Incidental touching;Verbal cues   Comment CG cues to wait or  slow down    Chair/Bed-to-Chair Transfer CARE Score 4   Transfer Bed/Chair/Wheelchair   Limitations Noted In LE Strength;Balance; Endurance;Problem Solving   Adaptive Equipment Roller Walker   Stand Pivot Contact Guard   Sit to [employer]   Stand to [facility] Guard;Supervision   Supine to Sit Supervision   Sit to Supine Supervision   Car Transfer Contact Guard   Bed, Chair, Wheelchair Transfer (FIM) 4 - Patient requires steadying assist or light touching   QI: Car Transfer   Assistance Needed Incidental touching   Comment CG with RW   Car Transfer CARE Score 4   Other Comments   Comments w/c management training with wife including replacing/removing leg rest, folding w/c    Assessment   Treatment Assessment PT session focused on family training with pt and wife  family training included education and hands on training on w/c management and repeated chair/car transfer training with pt using a RW  Despite posted reminder on proper technique for sit to stand transfers, pt demonstrated no carry over at all  He continues to require step by step cueing to complete transfers safely  at end of training, wife demonstrated good carry over of proper technique and provided appropriate verbal cueing to the pt to complete transfers safely  She demonstrated confidence of being able to provide needed assistance to the pt on thursday and their dtr will be there as well to assist  Wife was also able to return demo on how to manage w/c including folding/unfolding w/c and managing leg rest per wife pt will stay on the w/c the whole time he will be at the MD's office for the procedure  PT will continue to focused on functional mobility training, balance training and strengthening exercises to reduce burden of care and risk for falls at d/c  Family/Caregiver Present Gisele Daniels   PT Family training done with: FT performed with wife today- see assessment for details   Problem List Decreased range of motion;Decreased endurance; Impaired balance;Decreased mobility; Decreased coordination;Decreased cognition; Impaired judgement;Decreased safety awareness; Obesity   Barriers to Discharge Inaccessible home environment;Decreased caregiver support   PT Barriers   Physical Impairment Impaired balance;Decreased strength;Decreased endurance;Decreased cognition; Impaired judgement;Decreased safety awareness;Decreased coordination;Obesity; Impaired vision   Functional Limitation Walking;Stair negotiation;Car transfers; Ramp negotiation;Standing;Transfers   Plan   Treatment/Interventions Patient/family training   Progress Progressing toward goals   Recommendation   Recommendation 24 hour supervision/assist;Home with family support; Outpatient PT   PT - OK to Discharge No   PT Therapy Minutes   PT Time In 1230   PT Time Out 1310   PT Total Time (minutes) 40   PT Mode of treatment - Individual (minutes) 40   PT Mode of treatment - Concurrent (minutes) 0   PT Mode of treatment - Group (minutes) 0   PT Mode of treatment - Co-treat (minutes) 0   PT Mode of Teatment - Total time(minutes) 40 minutes   Therapy Time missed   Time missed?  No

## 2018-08-07 NOTE — PCC PHYSICAL THERAPY
Pt presents with ataxia, impaired coordination, impaired cognition, decreased safety awareness, decreased endurance and decreased balance s/p CVA  Pt requires consistent cues for safety techniques with RW, demonstrates minimal carry over despite max cueing  Pt has progressed to S for bed mobility, CG with cueing for tranfers and requires CG-min A when ambulating due to LOB to the R  Pt is at increased risk of falls due to impulsivity and decreased safety awareness  Pt's main barriers at this time are decreased insight to deficits and decreased carry over- initiated family training with pt's wife for transfers and ambulation  Pt's wife is receptive to education and demonstrates good carry over with cues and guarding techniques  Pt will continue to benefit from skilled PT services to address the above impairments and decreased burden of caregiver at d/c    Co-signed by: Slick Bae DPT

## 2018-08-07 NOTE — PROGRESS NOTES
08/07/18 1000   Pain Assessment   Pain Assessment No/denies pain   Pain Score No Pain   Restrictions/Precautions   Precautions Cognitive; Fall Risk;Bed/chair alarms; Impulsive;Supervision on toilet/commode   Weight Bearing Restrictions No   ROM Restrictions No   Grooming   Able To Shave   Limitation Noted In Coordination; Safety   Findings Pt was able to use electric milton to shave face, requiring VC for sequencing and direction of razor on face  Pt's wife completed shaving w/ a razor for safety b/c pt is on blood thinners, pt has RUE fatigue, and is impuslive  Pt in a seated position at the sink for activitity  Grooming (FIM) 4 - Patient requires steadying assist or light touching   QI: Upper Body [address] [facility] Provided by [place] No physical assistance   Upper Body Dressing CARE Score 4   QI: Lower Body Dressing   Assistance Needed Supervision   Assistance Provided by [place] No physical assistance   Lower Body Dressing CARE Score 4   QI: Putting On/Taking Off [address] [place] [address] Provided by [place] No physical assistance   Putting On/Taking Off Footwear CARE Score 4   QI: Picking Up Object   Reason if not Attempted Safety concerns   Picking Up Object CARE Score 88   Dressing/Undressing Clothing   Remove UB Clothes Pullover Shirt   Remove LB Clothes Pants;Socks   [address] LB Clothes Pants;Socks   Limitations Noted In Balance; Coordination; Endurance; Safety; Sequencing   Positioning Supported Sit;Standing   Findings Pt able to dress self in seated and standing position  OT directed wife to assist pt in dressing activity  Pt's wife provided steadying assist while pt was in stance to don pants      UB Dressing (FIM) 5 - Patient requires supervision/monitoring   LB Dressing (FIM) 4 - Patient requires steadying assist or light touching   QI: Sit to Stand   Assistance Needed Supervision   Assistance Provided by [place] No physical assistance   Sit to Stand CARE Score 4   QI: Chair/Bed-to-Chair Transfer   Assistance Needed Incidental touching   Chair/Bed-to-Chair Transfer CARE Score 4   Transfer Bed/Chair/Wheelchair   Positioning Concerns Cognition   Limitations Noted In Balance; Coordination; Endurance;Problem Solving   Adaptive Equipment None   Sit to Stand Supervision   Stand to Sit Supervision   Findings Pt is impulsive and requires CGA for balance and coordination  Pt's wife present for session and assisted in [address] w/ pt's transfers  Pt's wife was directed to hold onto hips of pt  Wife making pt a little unsteady on his feet because she was holding him back from walking fast  OT instructed pt to stop and listen to his wife  Pt and wife have a good partnership working together  Bed, Chair, Wheelchair Transfer (FIM) 4 - Patient requires steadying assist or light touching   QI: [address] Needed Incidental touching   [address] Score 4   Toileting   Able to [address] down yes, up yes  Manage Hygiene Bladder   Limitations Noted In Balance; Coordination;Problem Solving; Safety   Adaptive Equipment Grab Bar   Findings Pt required steadying assist while in stance at the toilet  Pt's wife provided CGA  Toileting (FIM) 4 - Patient requires steadying assist or light touching   QI: Toilet Transfer   Assistance Needed Incidental touching   Toilet Transfer CARE Score 4   Toilet Transfer   Surface Assessed Standard Toilet   Transfer Technique Standard   Limitations Noted In Balance; Endurance;Problem Solving; Safety   Adaptive Equipment Grab Bar   Findings in stance to urinate w/ cga from wife     Toilet Transfer (FIM) 4 - Patient requires steadying assist or light touching   Functional Standing Tolerance   Time 3:36, 2:16, 2:00   Activity [employer] Card Game   Comments Pt participated in preferred leisure activity of card game in order to increase functional standing tolerance, dynamic reach, attention, RUE coordination and strength, and memory/recall  Pt completed activity in standing position and initiated personal rest breaks  Pt was able to tolerate stance for 3:36, 2:16, and 2:00  Pt dealt the deck to each of the four players using his RUE  Pt was able to recall and teach the players the directions of the game  Pt was able to attend to the whole task, maintaining concentration on how many cards each player needs, and who wins each round  Pt was interested in the task and reports wanting to play with cards in his room while here  Cognition   Overall Cognitive Status Impaired   Arousal/Participation Alert; Cooperative   Attention Attends with cues to redirect   Orientation Level Oriented to person;Oriented to place   Memory Decreased recall of precautions;Decreased recall of recent events;Decreased short term memory   Following Commands Follows one step commands without difficulty   Activity Tolerance   Activity Tolerance Patient tolerated treatment well   Assessment   Treatment Assessment Pt participated in skilled OT session focused on activity tolerance, standing dynamic balance, recall/memory, RUE coordination and strength, and family training for ADLs  Pt demo improved attention to tasks compared to previous sessions and responds to cues to slow down  Pt continues to lack strength and coordination in RUE, vision in left eye, and activity tolerance  Pt reports "I'd like to go lay down, I am tired"  Pt continues to benefit from skilled OT sessions to maximize independence and safety w/ ADLs and IADLs  OT Family training done with: wife   Assessment of family training Pt's wife was present for FT about ADLs and functional mobility  Wife reports that pt can get eye procedure done tomorrow  Dr Sammi Israel notified  At this time, pt is safe for STP w/ CGA from [facility] <> car to attend the appointment  Wife demo good safety w/   See other notes for details      Prognosis Good   Problem List Impaired balance;Decreased mobility; Decreased cognition;Decreased coordination; Impaired judgement;Decreased safety awareness   Barriers to Discharge Inaccessible home environment   Plan   Treatment/Interventions ADL retraining;Functional transfer training;LE strengthening/ROM; Therapeutic exercise; Endurance training;Cognitive reorientation;Patient/family training;Equipment eval/education; Bed mobility; Compensatory technique education   Progress Progressing toward goals   OT Therapy Minutes   OT Time In 1000   OT Time Out 1100   OT Total Time (minutes) 60   OT Mode of treatment - Individual (minutes) 60   OT Mode of treatment - Concurrent (minutes) 0   OT Mode of treatment - Group (minutes) 0   OT Mode of treatment - Co-treat (minutes) 0   OT Mode of Teatment - Total time(minutes) 60 minutes

## 2018-08-07 NOTE — PROGRESS NOTES
[address] [facility] NOTE   Tiffany Gar 62 y o  male MRN: [id number]  Unit/Bed#: [facility] 263-51 Encounter: [id number]  Reason for Consult: ADEOLA on CKD    ASSESSMENT and PLAN:     62year old male with a past medical history of uncontrolled diabetes type 2, neuropathy, hypertension, hyperlipidemia, who initially presented to the hospital on 07/29 and was found to have acute left-sided CVA after presenting with slurred speech facial droop and right hemiparesis  Patient received tPA, which was complicated by small intracranial hemorrhage  Patient is now in the [facility] unit  Nephrology is consulted for acute kidney injury     1) acute on chronic kidney injury-    -patient follows with Dr Edwin Walker   -baseline creatinine 1 9-2 2 with recent baseline 2 2-2 4  -compliance was an issue in the past  -patient had a contrast on 07/29  -urinalysis-glucose positive, 4+ protein, 2-4 RBC   -patient has yet to complete the serologies that were ordered in the outpatient setting  These can be completed in the outpatient setting unless the renal function does not stabilize  - I/O  - avoid nephrotoxic agents   - Cr stable 8/7  - next BMP can be 8/9 from renal standpoint     2) CVA - as per Primary Team     3)hypertension      - continue carvedilol  - added amlodipine 8/6  - hold parameters in place  - in future, may transition amlodipine to ACEi     5) acid/base - bicarb stable     6) electrolytes - stable    - monitor K  - low K diet    7) RLE lesion    - healing  Non bleeding this AM       SUBJECTIVE / INTERVAL HISTORY:  Pt denies SOB, N/V  States wound on RLE was bleeding, not currently   SBP improving this AM    OBJECTIVE:  Current Weight: Weight - Scale: 107 kg (236 lb 15 9 oz)  Vitals:    08/06/18 1312 08/06/18 2032 08/07/18 0549 08/07/18 0746   BP: 152/92 167/80 152/76 118/66   BP Location: Right arm Left arm Left arm Left arm   Pulse: 73 70 67 70   Resp: 18 16 20    Temp:  97 5 °F (36 4 °C) 97 8 °F (36 6 °C)    TempSrc:  Oral Oral    SpO2: 94% 94% 95%    Weight:       Height:           Intake/Output Summary (Last 24 hours) at 08/07/18 0759  Last data filed at 08/07/18 0556   Gross per 24 hour   Intake              420 ml   Output                0 ml   Net              420 ml     General: NAD  Skin: no rash  Eyes: anicteric sclera  ENT: moist mucous membrane  Neck: supple  Chest: CTA b/l  CVS: s1s2  Abdomen: soft, nontender, distention   Extremities: no edema LE; RLE pretibial 1 cm lesion, healing    : no chávez  Neuro: AAXO3  Psych: normal affect    Medications:    Current Facility-Administered Medications:     acetaminophen (TYLENOL) tablet 650 mg, 650 mg, Oral, Q6H PRN, Gertrude Rodriguez MD, 650 mg at 08/05/18 1057    amLODIPine (NORVASC) tablet 5 mg, 5 mg, Oral, Daily, Krystle Pacheco MD    aspirin (ECOTRIN LOW STRENGTH) EC tablet 81 mg, 81 mg, Oral, Daily, Gertrude Rodriguez MD, 81 mg at 08/06/18 0801    atorvastatin (LIPITOR) tablet 80 mg, 80 mg, Oral, Daily With Vasu Zepeda MD, 80 mg at 08/06/18 1624    carvedilol (COREG) tablet 6 25 mg, 6 25 mg, Oral, BID With Meals, Krystle Pacheco MD, 6 25 mg at 08/06/18 1625    famotidine (PEPCID) tablet 20 mg, 20 mg, Oral, Daily, Gertrude Rodriguez MD, 20 mg at 08/06/18 0801    gabapentin (NEURONTIN) capsule 600 mg, 600 mg, Oral, Daily, Gertrude Rodriguez MD, 600 mg at 08/06/18 0801    insulin aspart protamine-insulin aspart (NovoLOG 70/30) 100 units/mL subcutaneous injection 35 Units, 35 Units, Subcutaneous, BID AC, Gertrude Rodriguez MD, 35 Units at 08/07/18 0748    insulin lispro (HumaLOG) 100 units/mL subcutaneous injection 1-5 Units, 1-5 Units, Subcutaneous, TID AC, 1 Units at 08/06/18 1137 **AND** Fingerstick Glucose (POCT), , , TID Gertrude COBURN MD    insulin lispro (HumaLOG) 100 units/mL subcutaneous injection 1-5 Units, 1-5 Units, Subcutaneous, HS, Gertrude Rodriguez MD, 1 Units at 08/05/18 2121    neomycin-bacitracin-polymyxin b (NEOSPORIN) ointment 1 small application, 1 small application, Topical, BID, Karen Taylor PA-C, 1 small application at 65/23/27 1718    polyethylene glycol (MIRALAX) packet 17 g, 17 g, Oral, Daily PRN, Daniel Boucher MD    Laboratory Results:    Results from last 7 days  Lab Units 08/07/18  0555 08/06/18  1931 08/05/18  0535 08/04/18  0539 08/03/18  0625 08/01/18  0515   WBC Thousand/uL  --  8 33  --   --   --  7 75   HEMOGLOBIN g/dL  --  12 2  --   --   --  12 4   HEMATOCRIT %  --  38 2  --   --   --  37 7   PLATELETS Thousands/uL  --  156  --   --   --  146*   SODIUM mmol/L 141 142 141 138 139 137   POTASSIUM mmol/L 4 8 4 5 4 5 4 2 4 5 4 1   CHLORIDE mmol/L 107 109* 109* 106 107 105   CO2 mmol/L 28 26 25 26 28 27   BUN mg/dL 30* 31* 30* 30* 26* 25   CREATININE mg/dL 2 29* 2 32* 2 49* 2 64* 2 47* 2 25*   CALCIUM mg/dL 9 0 9 0 8 7 9 0 8 8 9 0   PHOSPHORUS mg/dL  --   --   --   --   --  4 1   GLUCOSE RANDOM mg/dL 109 106 123 122 102 154*

## 2018-08-08 ENCOUNTER — TELEPHONE (OUTPATIENT)
Dept: SPEECH THERAPY | Facility: CLINIC | Age: 58
End: 2018-08-08

## 2018-08-08 LAB
GLUCOSE SERPL-MCNC: 111 MG/DL (ref 65–140)
GLUCOSE SERPL-MCNC: 113 MG/DL (ref 65–140)
GLUCOSE SERPL-MCNC: 127 MG/DL (ref 65–140)
GLUCOSE SERPL-MCNC: 134 MG/DL (ref 65–140)
GLUCOSE SERPL-MCNC: 72 MG/DL (ref 65–140)
GLUCOSE SERPL-MCNC: 83 MG/DL (ref 65–140)

## 2018-08-08 PROCEDURE — G0515 COGNITIVE SKILLS DEVELOPMENT: HCPCS

## 2018-08-08 PROCEDURE — 97110 THERAPEUTIC EXERCISES: CPT

## 2018-08-08 PROCEDURE — 82948 REAGENT STRIP/BLOOD GLUCOSE: CPT

## 2018-08-08 PROCEDURE — 97116 GAIT TRAINING THERAPY: CPT

## 2018-08-08 PROCEDURE — 99233 SBSQ HOSP IP/OBS HIGH 50: CPT | Performed by: PHYSICAL MEDICINE & REHABILITATION

## 2018-08-08 PROCEDURE — 97535 SELF CARE MNGMENT TRAINING: CPT

## 2018-08-08 PROCEDURE — 97112 NEUROMUSCULAR REEDUCATION: CPT

## 2018-08-08 PROCEDURE — 97127 HB COGNITIVE SKILLS DEVELOPMENT, EACH 15 MUNUTES: CPT

## 2018-08-08 PROCEDURE — 97530 THERAPEUTIC ACTIVITIES: CPT

## 2018-08-08 PROCEDURE — 99232 SBSQ HOSP IP/OBS MODERATE 35: CPT | Performed by: INTERNAL MEDICINE

## 2018-08-08 RX ORDER — AMLODIPINE BESYLATE 5 MG/1
5 TABLET ORAL EVERY 12 HOURS
Status: DISCONTINUED | OUTPATIENT
Start: 2018-08-08 | End: 2018-08-13 | Stop reason: HOSPADM

## 2018-08-08 RX ADMIN — CARVEDILOL 6.25 MG: 6.25 TABLET, FILM COATED ORAL at 07:41

## 2018-08-08 RX ADMIN — BACITRACIN, NEOMYCIN, POLYMYXIN B 1 SMALL APPLICATION: 400; 3.5; 5 OINTMENT TOPICAL at 17:10

## 2018-08-08 RX ADMIN — GABAPENTIN 600 MG: 300 CAPSULE ORAL at 08:48

## 2018-08-08 RX ADMIN — INSULIN ASPART 35 UNITS: 100 INJECTION, SUSPENSION SUBCUTANEOUS at 08:47

## 2018-08-08 RX ADMIN — ASPIRIN 81 MG: 81 TABLET, COATED ORAL at 08:51

## 2018-08-08 RX ADMIN — CARVEDILOL 6.25 MG: 6.25 TABLET, FILM COATED ORAL at 16:27

## 2018-08-08 RX ADMIN — AMLODIPINE BESYLATE 5 MG: 5 TABLET ORAL at 08:48

## 2018-08-08 RX ADMIN — FAMOTIDINE 20 MG: 20 TABLET ORAL at 08:51

## 2018-08-08 RX ADMIN — AMLODIPINE BESYLATE 5 MG: 5 TABLET ORAL at 20:51

## 2018-08-08 RX ADMIN — INSULIN ASPART 35 UNITS: 100 INJECTION, SUSPENSION SUBCUTANEOUS at 16:31

## 2018-08-08 RX ADMIN — ATORVASTATIN CALCIUM 80 MG: 80 TABLET, FILM COATED ORAL at 16:27

## 2018-08-08 RX ADMIN — BACITRACIN, NEOMYCIN, POLYMYXIN B 1 SMALL APPLICATION: 400; 3.5; 5 OINTMENT TOPICAL at 08:48

## 2018-08-08 NOTE — PCC NURSING
Patient is  a 62 y  o  male who presented to the [facility] 56 sided weakness, Rt facial droop, and slurred speech  Patient was given TPA and responded well and does not currently have paresis, slurred speech, or facial droop  Patient is diabetic and is being managed with QID BS and  with Novolog 70/30 as well as Humalog for coverage  Hypertension is managed with coreg and neuropathy is managed with gabapentin  Pain control is managed with PRN Tylenol  Diabetic and cardiac diets are prescribed  Continent of bowel and bladder  Alarms required for safety  Skin is intact  Transfer status is minimal assist of one with rolling walker to the bathroom with verbal cueing for safety  This week we will encourage independence with ADLs, monitor labs and vital signs, and maintain skin integrity   We will increase safety awareness with transfers and keep patient free from falls with through education

## 2018-08-08 NOTE — PROGRESS NOTES
08/08/18 1040   Pain Assessment   Pain Assessment No/denies pain   Pain Score No Pain   Restrictions/Precautions   Precautions Cognitive; Impulsive;Bed/chair alarms; Fall Risk;Supervision on toilet/commode   Weight Bearing Restrictions No   ROM Restrictions No   Memory Skills   Memory (FIM) 2 - Recognizes, recalls/performs 25-49%   Social Interaction (FIM) 5 - Interacts appropriately with others 90% of time   Speech/Language/Cognition Assessmetn   Treatment Assessment Pt participated in skilled ST session w/ focus on cognitive linguistic skills where pt's wife present for session  To initiate session, pt oriented to person and place, required min-mod assist to orient to current month, year and situation  Targeting STM recall, pt w/ decreased recall of AM ADL OT session but nodded in agreement when asked yes/no questions to improve recall  Pt also was 2/3 accurate w/ recall of bfast, demonstrating improvement in this area  When engaged in a visual memory task, pt re-educated on strategies to improve working memory such auditory rehearsal, visualization and using associations, however, limited carryover of strategies noted  Pt then completed a visual memory task w/ 3/8 accuracy where pt was noted to experience increased word finding deficits during this time and frequently used a combination of words and gestures to express messages  When auditorily presented w/ a Fo4 words during a word list retention task, pt recalled info based on category inclusion w/ 10/18 accuracy, noting for pt to at times elicit incorrect responses  To improve recall of info, pt benefited from verbal/semantic cues, along w/ repetition of info  Pt then responded to min-moderate complexity yes/no questions w/ 18/20 accuracy, noting pt's ability to self correct errors once he paused and thought about info, improving processing and awareness   When engaged in a 5-step written sequencing task, pt initially w/ increased difficulty w/ task due to impulsivity w/ responses, decreased attention and decreased comprehension of instructions  Pt then instructed to take a break and educated on pacing and need for completing one step at a time for increased accuracy of tasks and understanding  When pt attempted to complete sequencing independently, pt required overall max assist  However, when SLP read aloud each step w/ pt and cued pt for each step, pt was able to sequence steps w/ ~70% accuracy, noting a significant improvement in reasoning skills  Pt's wife also educated on assisting pt w/ pacing and need for slowing down to improve cognition  At this time, pt will cont to benefit from skilled ST services to maximize functional cognitive linguistic skills, safety and independence  SLP Therapy Minutes   SLP Time In 4081   SLP Time Out 1140   SLP Total Time (minutes) 60   SLP Mode of treatment - Individual (minutes) 60   SLP Mode of treatment - Concurrent (minutes) 0   SLP Mode of treatment - Group (minutes) 0   SLP Mode of treatment - Co-treat (minutes) 0   SLP Mode of Teatment - Total time(minutes) 60 minutes   Therapy Time missed   Time missed?  No   Daily FIM Score   Problem solving (FIM) 2 - Solves basic problems 25-49% of time   Comprehension (FIM) 4 - Understands basic info/conversation 75-90% of time   Expression (FIM) 4 - Expresses basic info/needs 75-90% of time

## 2018-08-08 NOTE — PROGRESS NOTES
08/08/18 1230   Pain Assessment   Pain Assessment 0-10   Pain Score 5   Pain Location Head   Pain Orientation Left   Pain Descriptors [facility] Pain Intervention(s) Repositioned; Ambulation/increased activity; Distraction   Response to Interventions pt tolerated session with minimal complaints    Restrictions/Precautions   Precautions Cognitive; Impulsive;Bed/chair alarms; Fall Risk;Supervision on toilet/commode   Weight Bearing Restrictions No   ROM Restrictions No   Cognition   Overall Cognitive Status Impaired   Arousal/Participation Alert; Cooperative   Attention Attends with cues to redirect   Orientation Level Oriented to person;Oriented to place   Memory Decreased recall of precautions;Decreased recall of recent events;Decreased short term memory   Following Commands Follows one step commands without difficulty   Subjective   Subjective Pt and wife are agreeable to continuing family training    QI: Roll Left and Right   Assistance Needed Set-up / clean-up   Roll Left and Right CARE Score 5   QI: Sit to [address] / clean-up   Sit to Lying CARE Score 5   QI: Lying to Sitting on Side of Bed   Assistance Needed Set-up / clean-up   Lying to Sitting on Side of Bed CARE Score 5   QI: Sit to Stand   Assistance Needed Incidental touching   Comment CS-CG   Sit to Stand CARE Score 4   Bed Mobility   Able to Roll Left to Right;Scoot Up;Right to Left   Findings DS    QI: Chair/Bed-to-Chair Transfer   Assistance Needed Incidental touching   Comment CS-CG   Chair/Bed-to-Chair Transfer CARE Score 4   Transfer Bed/Chair/Wheelchair   Limitations Noted In Balance; Coordination;Problem Solving;UE Strength; Sequencing;LE Strength   Adaptive Equipment Roller Walker   Stand Pivot Supervision;Contact Guard   Sit to [employer]   Stand to [facility] Guard;Supervision   Supine to Sit Supervision   Sit to Supine Supervision   Car Transfer Contact Guard   Findings Pt transfers with CG-CS; requires minimal cueing for hand placement for sit<>stand transfers with good carry over from w/c noted this session; when patient transfers from different surface (mat table, toilet) pt demonstrates decreased carry over and safety awareness    Bed, Chair, Wheelchair Transfer (FIM) 4 - Patient requires steadying assist or light touching   QI: Car Transfer   Assistance Needed Incidental touching   Comment CG with cues for proper sequencing and technique- provided by wife   Car Transfer CARE Score 4   QI: Walk 10 Feet   Assistance Needed Incidental touching   Walk 10 Feet CARE Score 4   QI: Walk 50 Feet with Two Turns   Assistance Needed Incidental touching   Walk 50 Feet with Two Turns CARE Score 4   QI: Walk 150 Feet   Assistance Needed Incidental touching   Walk 150 Feet CARE Score 4   QI: Walking 10 Feet on Uneven Surfaces   Assistance Needed Incidental touching   Walking 10 Feet on Uneven Surfaces CARE Score 4   Ambulation   Does the patient walk? 2  Yes   Primary Discharge Mode of Locomotion Walk   Walk Assist Level Contact Guard   Gait Pattern Inconsistant Prachi; Forward Flexion; Improper weight shift   Assist Device Bettyeer Dorothy Church Walked (feet) 150 ft  (x3)   Limitations Noted In Balance;Device Management; Endurance; Safety; Sequencing;Strength;Swing   Findings Pt ambulates with CG provided by wife; wife provides appropriate cueing to slow down, pt is receptive to her cueing and demonstrates good carry over of safety techniques  Walking (FIM) 4 - Patient requires steadying assist or light touching AND distance 150 feet or more, no rest   Wheelchair mobility   QI: Does the patient use a wheelchair? 0   No   QI: 1 Step (Curb)   Assistance Needed Incidental touching   1 Step (Curb) CARE Score 4   QI: 4 Steps   Assistance Needed Incidental touching   Comment cues for sequencing    4 Steps CARE Score 4   Stairs   Type Stairs  ()   # of Steps 4   Weight Bearing Precautions Fall Risk   Assist Devices Bilateral Rail   Findings Pt ascends/descends with CG and cues for proper sequencing; pt wife is present but does not provided CGA or cues at this time, will initiate stair trianing in future sessions   Stairs (FIM) 2 - Patient goes up and down 4 - 11 stairs regardless of assist/device/setup   QI: Toilet Transfer   Assistance Needed Incidental touching   Toilet Transfer CARE Score 4   Toilet Transfer   Surface Assessed Standard Toilet   Limitations Noted In [address]; Sequencing;UE Strength;LE Strength   Adaptive Equipment Grab Bar   Positioning Concerns Cognition   Findings Pt is CG for toilet transfer- but requires max A for toileting hygeine and clothing management    Toilet Transfer (FIM) 4 - Patient requires steadying assist or light touching   Therapeutic Interventions   Strengthening supine TE: bridging x20, SAQ #3 x30  Standing TE: hip abduction 2x10    Balance seated ball toss- 1 LOB noted that required min A to correct; toe taps onto stool x20 with no UE support    Assessment   Treatment Assessment PT session focused on continuing family training, improving balance, and strengthening  Pt's wife is present during session and participates in hands on family training during ambulation and transfers  Pt has made improvements in carry over of hand placement with RW this session- requires minimal cueing from w/c  However, when pt transfers to/from different or unfamiliar surface, such as hi low mat, recliner or toilet, pt requires consistent cueing for hand placement  Pt performs car transfer with CG and cueing for hand placement and RW management provided by wife  Pt's wife feels comfortable performing all transfers and providing assistance during ambulation  Ordered RW for patient today- pt denies need for any other DME  PT will continue to focus on improving functional mobility, safety awareness, balance, and continuing family training to ensure the safety of the pt and caregiver at time of d/c  Family/Caregiver Present Criselda    PT Family training done with: hands on FT done with wife    Problem List Decreased range of motion;Decreased endurance; Impaired balance;Decreased mobility; Decreased coordination;Decreased cognition; Impaired judgement;Decreased safety awareness; Obesity   Barriers to Discharge Inaccessible home environment;Decreased caregiver support   PT Barriers   Physical Impairment Impaired balance;Decreased strength;Decreased endurance;Decreased cognition; Impaired judgement;Decreased safety awareness;Decreased coordination;Obesity; Impaired vision   Functional Limitation Walking;Stair negotiation;Car transfers; Ramp negotiation;Standing;Transfers   Plan   Treatment/Interventions Functional transfer training;LE strengthening/ROM; Elevations; Patient/family training; Therapeutic exercise; Endurance training;Bed mobility;Gait training   Progress Progressing toward goals   Recommendation   Recommendation 24 hour supervision/assist;Outpatient PT; Home with family support   Equipment Recommended Walker   PT Equipment ordered RW   Date ordered 08/08/18   PT - OK to Discharge No   PT Therapy Minutes   PT Time In 1230   PT Time Out 1400   PT Total Time (minutes) 90   PT Mode of treatment - Individual (minutes) 60   PT Mode of treatment - Concurrent (minutes) 30   PT Mode of treatment - Group (minutes) 0   PT Mode of treatment - Co-treat (minutes) 0   PT Mode of Teatment - Total time(minutes) 90 minutes   Therapy Time missed   Time missed?  No

## 2018-08-08 NOTE — PROGRESS NOTES
[address] [facility] NOTE   Valdemar Vincent 62 y o  male MRN: [id number]  Unit/Bed#: [facility] 556-92 Encounter: [id number]  Reason for Consult: ADEOLA on CKD    ASSESSMENT and PLAN:    62year old male with a past medical history of uncontrolled diabetes type 2, neuropathy, hypertension, hyperlipidemia, who initially presented to the hospital on 07/29 and was found to have acute left-sided CVA after presenting with slurred speech facial droop and right hemiparesis  Patient received tPA, which was complicated by small intracranial hemorrhage  Patient is now in the arc unit  Nephrology is consulted for acute kidney injury     1) acute on chronic kidney injury-    -patient follows with Dr Dixon Dunn   -baseline creatinine 1 9-2 2 with recent baseline 2 2-2 4  -compliance was an issue in the past  -patient had a contrast on 07/29  -urinalysis-glucose positive, 4+ protein, 2-4 RBC   -patient has yet to complete the serologies that were ordered in the outpatient setting  These can be completed in the outpatient setting unless the renal function does not stabilize  - I/O  - avoid nephrotoxic agents   - Cr stable 8/7  - next BMP can be 8/9 from renal standpoint     2) CVA - as per Primary Team     3)hypertension      - continue carvedilol  - added amlodipine 8/6 and increased to BID on 8/8  - hold parameters in place  - in future, may transition amlodipine to ACEi     5) acid/base - bicarb stable     6) electrolytes - stable     - monitor K  - low K diet     7) RLE lesion     - healing    SUBJECTIVE / INTERVAL HISTORY:  Pt denies complaints  SBP 150s       OBJECTIVE:  Current Weight: Weight - Scale: 107 kg (235 lb 10 8 oz)  Vitals:    08/07/18 1323 08/07/18 1639 08/07/18 2030 08/08/18 0600   BP: 154/83 146/76 169/81 154/81   BP Location: Left arm Left arm Left arm Left arm   Pulse: 69 74 69 70   Resp: 20  20 20   Temp: 97 8 °F (36 6 °C)  97 9 °F (36 6 °C) 97 7 °F (36 5 °C)   TempSrc: Oral  Oral Oral   SpO2: 97%  95% 96% Weight:    107 kg (235 lb 10 8 oz)   Height:           Intake/Output Summary (Last 24 hours) at 08/08/18 1643  Last data filed at 08/07/18 1718   Gross per 24 hour   Intake              555 ml   Output                0 ml   Net              555 ml     General: NAD  Skin: no rash  Eyes: anicteric sclera  ENT: moist mucous membrane  Neck: supple  Chest: CTA b/l  CVS: s1s2  Abdomen: soft, nontender, distention   Extremities: no edema LE;  : no chávez  Neuro: AAXO3  Psych: normal affect    Medications:    Current Facility-Administered Medications:     acetaminophen (TYLENOL) tablet 650 mg, 650 mg, Oral, Q6H PRN, Ivory Mclean MD, 650 mg at 08/07/18 1603    amLODIPine (NORVASC) tablet 5 mg, 5 mg, Oral, Q12H, Osmel Bee MD    aspirin (ECOTRIN LOW STRENGTH) EC tablet 81 mg, 81 mg, Oral, Daily, Ivory Mclean MD, 81 mg at 08/07/18 0837    atorvastatin (LIPITOR) tablet 80 mg, 80 mg, Oral, Daily With Elvin Bhatia MD, 80 mg at 08/07/18 1646    carvedilol (COREG) tablet 6 25 mg, 6 25 mg, Oral, BID With Meals, Osmel Bee MD, 6 25 mg at 08/08/18 0741    famotidine (PEPCID) tablet 20 mg, 20 mg, Oral, Daily, Ivory Mclean MD, 20 mg at 08/07/18 0836    gabapentin (NEURONTIN) capsule 600 mg, 600 mg, Oral, Daily, Ivory Mclean MD, 600 mg at 08/07/18 0837    insulin aspart protamine-insulin aspart (NovoLOG 70/30) 100 units/mL subcutaneous injection 35 Units, 35 Units, Subcutaneous, BID AC, Ivory Mclean MD, 35 Units at 08/07/18 1646    insulin lispro (HumaLOG) 100 units/mL subcutaneous injection 1-5 Units, 1-5 Units, Subcutaneous, TID AC, 1 Units at 08/07/18 1647 **AND** Fingerstick Glucose (POCT), , , TID AC, Ivory Mclean MD    insulin lispro (HumaLOG) 100 units/mL subcutaneous injection 1-5 Units, 1-5 Units, Subcutaneous, HS, Ivory Mclean MD, 1 Units at 08/05/18 2121    neomycin-bacitracin-polymyxin b (NEOSPORIN) ointment 1 small application, 1 small application, Topical, BID, Karen Taylor, ALLISON, 1 small application at 52/53/41 0910    polyethylene glycol (MIRALAX) packet 17 g, 17 g, Oral, Daily PRN, Tamir Glynn MD    Laboratory Results:    Results from last 7 days  Lab Units 08/07/18  0555 08/06/18  0521 08/05/18  0535 08/04/18  0539 08/03/18  0625   WBC Thousand/uL  --  8 33  --   --   --    HEMOGLOBIN g/dL  --  12 2  --   --   --    HEMATOCRIT %  --  38 2  --   --   --    PLATELETS Thousands/uL  --  156  --   --   --    SODIUM mmol/L 141 142 141 138 139   POTASSIUM mmol/L 4 8 4 5 4 5 4 2 4 5   CHLORIDE mmol/L 107 109* 109* 106 107   CO2 mmol/L 28 26 25 26 28   BUN mg/dL 30* 31* 30* 30* 26*   CREATININE mg/dL 2 29* 2 32* 2 49* 2 64* 2 47*   CALCIUM mg/dL 9 0 9 0 8 7 9 0 8 8   GLUCOSE RANDOM mg/dL 109 106 123 122 102

## 2018-08-08 NOTE — TEAM CONFERENCE
Acute RehabilitationTeam Conference Note  Date: 8/8/2018   Time: 11:44 AM       Patient Name:  Alexei Cabrera       Medical Record Number: [id number]   YOB: 1960  Sex: Male          Room/Bed:  /[facility] 964-01  Payor Info:  Payor: Amandeep Most / Plan: Ander [employer] HMO / Product Type: HMO Commercial /      Admitting Diagnosis: Stroke (cerebrum) ([facility] ) [I63 9]   Admit Date/Time:  8/2/2018  2:39 PM  Admission Comments: No comment available     Primary Diagnosis:  Ischemic cerebrovascular accident (CVA) ([facility] )  Principal Problem: Ischemic cerebrovascular accident (CVA) [facility])    Patient Active Problem List    Diagnosis Date Noted    Neuropathy 08/03/2018    Cognitive impairment 08/03/2018    Elevated alkaline phosphatase level 08/03/2018    CKD (chronic kidney disease) 08/03/2018    Ischemic cerebrovascular accident (CVA) ([facility] ) 07/29/2018    Essential hypertension 10/25/2016    Type 2 diabetes mellitus with hyperglycemia ([place] [address] ) 02/26/2016    Hyperlipidemia 02/26/2016       Physical Therapy:    Weight Bearing Status: Full Weight Bearing  Transfers: Contact Guard, Supervision  Bed Mobility: Supervision  Amulation Distance (ft): 150 feet  Ambulation: Minimal Assistance, Contact Guard  Assistive Device for Ambulation: Roller Walker  Wheelchair Mobility Distance:  (n/a)  Number of Stairs: 4  Assistive Device for Stairs: Bilateral [employer]  Stair Assistance: Contact Guard  Ramp:  (will assess when appropriate )  Discharge Recommendations: [facility]  [facility] with[de-identified] 24 Hour Supervision, 24 Hour Assisteance, Outpatient Physical Therapy    Pt presents with ataxia, impaired coordination, impaired cognition, decreased safety awareness, decreased endurance and decreased balance s/p CVA  Pt requires consistent cues for safety techniques with RW, demonstrates minimal carry over despite max cueing   Pt has progressed to S for bed mobility, CG with cueing for tranfers and requires CG-min A when ambulating due to LOB to the R  Pt is at increased risk of falls due to impulsivity and decreased safety awareness  Pt's main barriers at this time are decreased insight to deficits and decreased carry over- initiated family training with pt's wife for transfers and ambulation  Pt's wife is receptive to education and demonstrates good carry over with cues and guarding techniques  Pt will continue to benefit from skilled PT services to address the above impairments and decreased burden of caregiver at d/c  Co-signed by: Ramon Catalan DPT    Occupational Therapy:  Eating: Supervision  Grooming: Supervision  Bathing: Minimal Assistance  Bathing: Minimal Assistance  Upper Body Dressing: Supervision  Lower Body Dressing: Minimal Assistance  Toileting: Contact Guard  Tub/Shower Transfer: Contact Guard  Toilet Transfer: Contact Guard  Cognition: Exceptions to WNL  Cognition: Decreased Memory, Decreased Safety, Decreased Executive Functions, Decreased Attention, Impulsive  Orientation: Person, Place  Discharge Recommendations: Home with:  [address] with[de-identified] Family Support, Outpatient Occupational Therapy, 24 Hour Supervision       Pt is demonstrating good progress with occupational therapy and is progressing toward mod independent/indendent level goals for ADL, IADL, and functional transfers/mobility  Pt continues to present with impairments in activity tolerance, endurance, standing balance/tolerance, UE ROM, FMC, memory, insight, safety , judgement , attention , sequencing , visual perceptual skills  and depth perception   Occupational performance remains limited by fatigue, impulsivity, (R) UE dysmetria, diplopia, nystagmus and (L) visual deficits   Eye Patch has improved acuity at this time  Pt reports that he has his eyes drained and has not been able get it done since admission   Pt will continue to benefit from skilled acute rehab OT services to address above mentioned barriers and maximize functional independence in baseline areas of occupation to meet established treatment goals with overall decreased burden of care  Speech Therapy:  Mode of Communication: Verbal  Speech/Language:  (mild anomia )  Cognition: Exceptions to WNL  Cognition: Decreased Memory, Decreased Executive Functions, Decreased Attention, Decreased Comprehension, Decreased Safety  Orientation: Person, Place  Discharge Recommendations: Home with:  [address] with[de-identified] 24 Hour Supervision, 24 Hour Assisteance, Family Support, Outpatient Speech Therapy  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  Pt continues to demonstrate mild word finding deficits in basic conversational speech as well as in structured tasks  Overall insight to deficits was decreased, noting most impairment w/ STM recall, executive function tasks (sequencing, problem solving, organization)  Pt's wife has been present and educated on current deficits and need to maximize skills at this time  Additionally, recommendations are for supervision/assistance upon d/c home at this time as well as pending progress in therapy for further recommendations  Will continue to benefit from SLP services at this time to maximize overall cognitive linguistic skills at this time  Nursing Notes:  Appetite: Good  Diet Type: Diabetic                      Diet Patient/Family Education Complete:  Yes                Incision 02/29/16 Abdomen Other (Comment) (Active)       Incision 10/28/16 Foot Right (Active)     Wound 10/28/16 Amputation Toe (Comment  which one) Right (Active)        Type of Wound Patient/Family Education: Yes  Bladder: 7 - Complete [place]        Bowel: 7 - Complete [place]     Bowel Patient/Family Education: Yes  Pain Location: Head  Pain Orientation: Bilateral  Pain Score: 0                       Hospital Pain Intervention(s): Repositioned, Ambulation/increased activity, Distraction          Patient is  a 62 y  o  male who presented to the [facility] 56 sided weakness, Rt facial droop, and slurred speech  Patient was given TPA and responded well and does not currently have paresis, slurred speech, or facial droop  Patient is diabetic and is being managed with QID BS and  with Novolog 70/30 as well as Humalog for coverage  Hypertension is managed with coreg and neuropathy is managed with gabapentin  Pain control is managed with PRN Tylenol  Diabetic and cardiac diets are prescribed  Continent of bowel and bladder  Alarms required for safety  Skin is intact  Transfer status is minimal assist of one with rolling walker to the bathroom with verbal cueing for safety  This week we will encourage independence with ADLs, monitor labs and vital signs, and maintain skin integrity  We will increase safety awareness with transfers and keep patient free from falls with through education         Case Management:     Discharge Planning  Living Arrangements: Spouse/significant other  Support Systems: Spouse/significant other, Children  Assistance Needed: PT/OT  Type of Current Residence: Private residence  [address]: No  Patient participating in therapy and family would like to take him home at discharge  Team is recommending supervision at present and wife will be available till the end of August and then patient's 4 children will alternate schedules that he has supervision 24/7  CM will follow to assist with dc needs    Is the patient actively participating in therapies?  yes  List any modifications to the treatment plan:     Barriers Interventions   Cognition, carryover Speech therapy exercise   balance Therapy exercises, use of device Impuslive, ataxia, decreased attnetion Cueing to attend task, safety education techniques             Is the patient making expected progress toward goals? yes  List any update or changes to goals:     Medical Goals: Patient will be medically stable for discharge to [facility] upon completion of rehab program and Patient will be able to manage medical conditions and comorbid conditions with medications and follow up upon completion of rehab program    Weekly Team Goals:   Rehab Team Goals  ADL Team Goal: Patient will require assist with ADLs with least restrictive device upon completion of rehab program  Transfer Team Goal: Patient will require supervision with transfers with least restrictive device upon completion of rehab program  Locomotion Team Goal: Patient will require supervision with locomotion with least restrictive device upon completion of rehab program  Cognitive Team Goal: Patient will require assist for basic cognitive tasks upon completion of rehab program    Discussion: in attendance to review pts progress is rn pt ot slp cm and phsyician  Pt needs cueing to follow through with tasks safely  Pt is contact guard/close supervision with ambulation with roller walker  Pt has cog deficits and wife is aware as she is present daily  Pt is contact guard with adls, with a person for cueing for safety  Pt is able to sequence through tasks, but has balance deficits  Recommendations are for contd outpt pt ot and slp services       Anticipated Discharge Date: 8/13/18

## 2018-08-08 NOTE — PROGRESS NOTES
Physical Medicine and Rehabilitation Progress Note  Alexei Cabrera 62 y o  male MRN: [id number]  Unit/Bed#: [facility] 404-24 Encounter: [id number]    HPI: Alexei Cabrera is a 62 y o  male who presented to the [address] with Rt sided weakness, Rt facial droop, and slurred speech  Patient was given TPA and responded well and does not currently have paresis, slurred speech, or facial droop       Subjective: Patient without complaint currently         ROS:  Negative except as oultined above     Assessment/Plan:      CKD (chronic kidney disease)   Assessment & Plan    Baseline Cr appears to be 2 0-2 25, renal service managing as inpt (follows with Dr Roscoe Rene as OP), currently 2 29        Elevated alkaline phosphatase level   Assessment & Plan    Chronically elevated going back 1 year, currently mildly elevated at 133 ()         Cognitive impairment   Assessment & Plan    Per family present x 1 year, OP [facility] for further testing/management         Neuropathy   Assessment & Plan    Likely 2/2 to DM, on home neurontin 600 qd        Essential hypertension   Assessment & Plan    Home coreg increased from 3 125 to 6 25 mg BID  home norvasc increased from 5 mg qd to q12  Renal managing          Hyperlipidemia   Assessment & Plan    Home lovastatin 20 mg qpm changed to lipitor 80 mg qpm per neuro for CVA        Type 2 diabetes mellitus with hyperglycemia (HCC)   Assessment & Plan    On home novolog 70/30 35 units BID        * Ischemic cerebrovascular accident (CVA) ([facility] )   Assessment & Plan    On home ASA 81 mg qd  Home lovastatin 20 mg qpm changed to lipitor 80 mg qpm per neuro           Scheduled Meds:    Current Facility-Administered Medications:  acetaminophen 650 mg Oral Q6H PRN Leah Ashraf MD   amLODIPine 5 mg Oral Q12H Joby Ruiz MD   aspirin 81 mg Oral Daily Leah Ashraf MD   atorvastatin 80 mg Oral Daily With Saba Hatch MD   carvedilol 6 25 mg Oral BID With Meals Jessica Boyce MD   famotidine 20 mg Oral Daily Floyd Rosa MD   gabapentin 600 mg Oral Daily Floyd Rosa MD   insulin aspart protamine-insulin aspart 35 Units Subcutaneous BID AC Floyd Rosa MD   insulin lispro 1-5 Units Subcutaneous TID AC Floyd Rosa MD   insulin lispro 1-5 Units Subcutaneous HS Floyd Rosa MD   neomycin-bacitracin-polymyxin b 1 small application Topical BID Karen Taylor PA-C   polyethylene glycol 17 g Oral Daily PRN Floyd Rosa MD        DVT ppx: SCDs b/l, patient ambulating sufficient distances  Dispo: 8/13     Will defer to PCP with further testing/treatment and/or specialist referral at PCP's discretion      Incidental findings noted on CT A/P 8/2016:  1) Rt renal cysts  2) cirrhosis/portal hypertension  3) splenomegaly     Other incidental findings:  1) grade I DD  2) Rt carotid and B/L vertebral artery stenosis    Objective:    Functional Update:  Mobility: min-cg  Transfers: cg-sup  ADLs: min-cg    Allergies per EMR    Physical Exam:      General: alert, no apparent distress, cooperative and comfortable  HEENT:  Head: Normocephalic, no lesions, without obvious abnormality  CARDIAC:  +s1/2  LUNGS:  respirations unlabored   ABDOMEN:  soft NT   EXTREMITIES:  no significant LE edema   NEURO:   awake, alert, approriately answering questions   PSYCH:  mood/affect currently stable       Diagnostic Studies: reviewed, no new imaging  Ct Head Wo Contrast    Result Date: 8/5/2018  Impression: Expected evolution of the known ischemic infarct in the left basal ganglion since prior CT  No hemorrhagic conversion  No new ischemic infarcts are seen  No new intraparenchymal hemorrhages are seen  No hydrocephalus   Workstation performed: [id number]       Laboratory:      Results from last 7 days  Lab Units 08/06/18  0521   HEMOGLOBIN g/dL 12 2   HEMATOCRIT % 38 2   WBC Thousand/uL 8 33       Results from last 7 days  Lab Units 08/07/18  0555 08/06/18  6330 08/05/18  0535   BUN mg/dL 30* 31* 30*   SODIUM mmol/L 141 142 141   POTASSIUM mmol/L 4 8 4 5 4 5   CHLORIDE mmol/L 107 109* 109*   GLUCOSE RANDOM mg/dL 109 106 123   CREATININE mg/dL 2 29* 2 32* 2 49*            Patient Active Problem List   Diagnosis    Type 2 diabetes mellitus with hyperglycemia (HCC)    Hyperlipidemia    Essential hypertension    Ischem[facility] ([facility]A) ([facility] )    Neuropathy    Cognitive impairment    Elevated alkaline phosphatase level    CKD (chronic kidney disease)           Total time spent: Minimum 35 minutes was spent face-to-face with the patient  In addition, this patient was discussed by the interdisciplinary team in weekly case conference today  The care of the patient was extensively discussed with all care providers and an appropriate rehabilitation plan was formulated unique for this patient  Barriers were identified preventing progression of therapy and appropriate interventions were discussed with each discipline  Please see the team note for input from all disciplines regarding barriers, intervention, and discharge planning

## 2018-08-08 NOTE — OCCUPATIONAL THERAPY NOTE
OccupationalTherapy Progress Note     Patient Name: Loreto Marroquin  GQLBA'L Date: 8/8/2018  Problem List  Patient Active Problem List   Diagnosis    Type 2 diabetes mellitus with hyperglycemia ([facility] )    Hyperlipidemia    Essential hypertension    Ischemic cerebrovascular accident (CVA) ([facility] )    Neuropathy    Cognitive impairment    Elevated alkaline phosphatase level    CKD (chronic kidney disease)         08/08/18 0735   Pain Assessment   Pain Assessment No/denies pain   Restrictions/Precautions   Precautions Cognitive; Impulsive;Bed/chair alarms; Fall Risk;Supervision on toilet/commode   Lifestyle   Autonomy "what do I need to work on?"   QI: Oral Hygiene   Comment pt refuses to rinse mouth    Grooming   Able To Wash/Dry Hands   Limitation Noted In Coordination;Strength;Timeliness   Findings pt stands at sink with close supervision to complete hand hygiene    Grooming (FIM) 5 - Patient requires supervision/monitoring   QI: Shower/Bathe Self   Assistance Needed Incidental touching   Assistance Provided by [place] Less than 25%   Shower/Bathe Self CARE Score 3   Bathing   Assessed Bath Style Sponge Bath   Anticipated D/C Bath Style Shower   Able to [place] Yes   Able to [employer] Temperature Yes   Able to Wash/Rinse/Dry (body part) Left Arm;Right Arm;L Upper Leg;R Upper Leg;L Lower Leg/Foot;R Lower Leg/Foot;Chest;Abdomen;Perineal Area; Buttocks   Limitations Noted in Balance; Endurance;Problem Solving;Coordination; Safety;Timeliness   Positioning Seated;Standing   Findings  pt declines shower, agreeable to sponge bath  OTR/L educated pt on recommendation to complete UB/LE bathing while seated 2* impaired dyanamic standing balance which increases fall risk  Pt is able to repeat understanding of fall risk with min cues   Pt completes bathing while seated with supervision, requires min assist for steadying in stance 2* impaired dynamic standing balance    Bathing (FIM) 4 - Patient requires steadying assist or light touching   QI: Upper Body Dressing   Assistance Needed Incidental touching   Comment during clothing retrieval while in stance    Upper Body Dressing CARE Score 4   QI: Lower Body Dressing   Assistance Needed Incidental touching   Assistance Provided by [place] Less than 25%   Comment steadying assist in stance 2* impaired dynamic standing balance    Lower Body Dressing CARE Score 3   QI: Putting On/Taking Off Footwear   Assistance Needed Supervision   Comment pt presents with impaired problem solving, requires verbal cues to problem solve crossing LEs over opposing knee instead of bending 2* fall risk  Pt additionally requries cues to problem solve need to widen sock prior to donning over foot, as sock was stuck on toes  Putting On/Taking Off Footwear CARE Score 4   Dressing/Undressing Clothing   Remove LB Clothes Pants; Undergarment;Socks   Don UB [address] [place] [address]; Undergarment;Socks   Limitations Noted In Balance; Coordination; Safety;Problem Solving;Timeliness   Positioning Supported Sit;Standing   Findings pt requires steadying assist during clothing retrieval from [employer]   Pt completes UB dressing while seated and threads LEs into pants/underwear while seated with supervision, requires min assist in stance for steadying    UB Dressing (FIM) 4 - Patient requires steadying assist or light touching   LB Dressing (FIM) 4 - Patient requires steadying assist or light touching   QI: Roll Left and Right   Assistance Needed Supervision   Roll Left and Right CARE Score 4   QI: Sit to [address] to Lying CARE Score 4   QI: Lying to Sitting on Side of Bed   Assistance Needed Supervision   Lying to Sitting on Side of Bed CARE Score 4   QI: Sit to Stand   Assistance Needed Physical assistance   Assistance Provided by [place] 25%-49%   Sit to Stand CARE Score 3   QI: Chair/Bed-to-Chair Transfer   Assistance Needed Physical assistance   Assistance Provided by [place] 25%-49%   Comment RW   Chair/Bed-to-Chair Transfer CARE Score 3   Transfer Bed/Chair/Wheelchair   Positioning Concerns Cognition   Limitations Noted In Balance;Problem Solving;Vision;Coordination   Adaptive Equipment Roller Walker;None   Sit to Stand Minimal;Assist x 1;Supervision   Stand to Sit Minimal;Assist x 1;Supervision   Supine to Sit Supervision   Sit to Supine Supervision   Findings Pt requires min assist for functional mobility/transfers without use of RW  2* impairments in memory and safety awareness, pt presents with poor carryover on safe use of RW and therefore will require repetitive training to increase carryover  Engaged in repetitive sit <> stand training with RW and focus on correct hand placement to increase pt's safety during ADL/IADL tasks  Pt completed sit <> stand transfers 20x with rest breaks as needed  Pt initially required total cues, however progressed in carryover of hand placement with repetitive verbal/tacticle cues and able to complete last 5/20 sit <> stand transfers with only 1 cue  Spoke with PT [place], pt will benefit from repetitive training during all new learning tasks 2* cognitive deficits  Bed, Chair, Wheelchair Transfer (FIM) 4 - Patient requires steadying assist or light touching   QI: [address] Needed Incidental touching   Assistance Provided by [place] Less than 25%   Toileting Hygiene CARE Score 3   Toileting   Able to [address] down yes, up yes  Manage Hygiene Bladder   Limitations Noted In Balance; Coordination;Problem Solving; Safety   Adaptive Equipment Grab Bar   Toileting (FIM) 4 - Patient requires steadying assist or light touching   Cognition   Overall Cognitive Status Impaired   Arousal/Participation Alert; Cooperative   Attention Attends with cues to redirect   Orientation Level Oriented to person;Oriented to place;Oriented to situation   Memory Decreased recall of recent events;Decreased recall of precautions;Decreased short term memory   Following Commands Follows one step commands with increased time or repetition   Comments Pt demonstrates fair insight into current deficits, is able to verbalize his risk for falls 2* impaired balance and is able to state that shower increases risk of falls 2* wet environment with minimal prompting  While pt is able to verbalize this information, pt's independence is limited 2* impaired safety awareness, impulsivity, short-term memory and carryover of precautions  Pt benefits from repetitive training on hand placement while using RW  Pt will continue to benefit from repetitive training on new learning tasks  During ADL routine pt is able to identify items required to complete ADL and sequence through ADL with min cues  Activity Tolerance   Activity Tolerance Patient tolerated treatment well   Assessment   Treatment Assessment Pt participated in skilled OT tx session focused on ADL performance with focus on identify items required to complete ADL and sequencing through ADL routine, as well as repetitive transfer training  See above for further details on functional performance  Pt demonstrates progress in hand placement during functional transfers s/p repetitive training (15x)  Pt limited in independence during ADL/IADL tasks 2* cognition, impaired static/dynamic standing balance  Pt will continue to benefit from skilled OT intervention to address the above mentioned deficits in order to maximize functional independence in ADLS, functional mobility/transfers and IADLS while decreasing burden of care  Prognosis Good   Plan   Treatment/Interventions ADL retraining;Functional transfer training; Endurance training;Patient/family training;Equipment eval/education; Bed mobility;Cognitive reorientation; Compensatory technique education   Progress Progressing toward goals   Recommendation   OT Discharge Recommendation (to be determined pending pt's progress )   OT Therapy Minutes OT Time In 0735   OT Time Out 0830   OT Total Time (minutes) 55   OT Mode of treatment - Individual (minutes) 55   OT Mode of treatment - Concurrent (minutes) 0   OT Mode of treatment - Group (minutes) 0   OT Mode of treatment - Co-treat (minutes) 0   OT Mode of Teatment - Total time(minutes) 55 minutes   Therapy Time missed   Time missed?  No       Mona Willson MS, OTR/L , CBIS

## 2018-08-08 NOTE — SOCIAL WORK
CM met with patient and wife to review weekly team meeting and discuss current functional barriers  Patient's adult children discussed need for supervision on admission and spoke of all alternating schedules to cover after patient's wife goes back to work the end of August  Per wife today, the children are not going to be available and she needs names of Adult Day Care where she can take him when she goes to work  List for [facility] provided  Agreeable to dc date 8/13 [facility]  Spoke to [place] at [facility] for PT 8/16 at 5:30pm, Paul Speech will call wife to schedule

## 2018-08-08 NOTE — PROGRESS NOTES
Internal Medicine Progress Note  Patient: Amos Schafer  Age/sex: 62 y o  male  Medical Record #: [id number]      ASSESSMENT/PLAN:  Amos Schafer is seen and examined and mangement for following issues:    # L CVA:  S/p tPA w/small ICH post infusion  Continue ASA/Statin as well as aggressive lifestyle modifications; aggressive therapy per PMR  Outpt Neurology follow-up       # DM II:  Previously uncontrolled d/t non compliance - HA1C 7 5; will continue Novolog 70/30 35U 2x daily  Continue SSI and accuchecks  Adjust medications as needed  Need to keep much tighter control - was discussed with pt's wife  BS: [phone]     # CKD III:  Previous baseline was 1 8-2 2  Currently off ACE inhibitor  Renal following  Cr improved with IV fluids to 2 32      # HTN:  Cont Coreg 6 25 mg 2 times daily  Renal added amlodipine 5mg yesterday      # h/o spinal stimulator:  Stable     # Non compliance:  Cont to stress the importance of compliance      Subjective: Patient seen and examined  Pt states he is doing well patient is asking when he will be discharged and is looking for to it       ROS:   GI: denies abdominal pain, change bowel habits or reflux symptoms  Neuro: No new neurologic changes  Respiratory: No Cough, SOB  Cardiovascular: No CP, palpitations     Scheduled Meds:    Current Facility-Administered Medications:  acetaminophen 650 mg Oral Q6H PRN Laquita Barry MD   amLODIPine 5 mg Oral Q12H Shaquille Montero MD   aspirin 81 mg Oral Daily Laquita Barry MD   atorvastatin 80 mg Oral Daily With Miguelina Chris MD   carvedilol 6 25 mg Oral BID With Meals Shaquille Montero MD   famotidine 20 mg Oral Daily Laquita Barry MD   gabapentin 600 mg Oral Daily Laquita Barry MD   insulin aspart protamine-insulin aspart 35 Units Subcutaneous BID AC Laquita Barry MD   insulin lispro 1-5 Units Subcutaneous TID Bandar Wick MD   insulin lispro 1-5 Units Subcutaneous HS Laquita Barry MD   neomycin-bacitracin-polymyxin b 1 small application Topical BID Karen Taylor PA-C   polyethylene glycol 17 g Oral Daily PRN Tamir Glynn MD       Labs:       Results from last 7 days  Lab Units 08/06/18  0521   WBC Thousand/uL 8 33   HEMOGLOBIN g/dL 12 2   HEMATOCRIT % 38 2   PLATELETS Thousands/uL 156       Results from last 7 days  Lab Units 08/07/18  0555 08/06/18  0521   SODIUM mmol/L 141 142   POTASSIUM mmol/L 4 8 4 5   CHLORIDE mmol/L 107 109*   CO2 mmol/L 28 26   BUN mg/dL 30* 31*   CREATININE mg/dL 2 29* 2 32*   GLUCOSE RANDOM mg/dL 109 106   CALCIUM mg/dL 9 0 9 0                  Glucose (mg/dL)   Date Value   08/07/2018 109   08/06/2018 106   08/05/2018 123   08/04/2018 122   01/03/2016 189 (H)   01/02/2016 178 (H)   01/01/2016 113   12/31/2015 128     Glucose, i-STAT (mg/dl)   Date Value   07/29/2018 213 (H)       Labs reviewed    Physical Examination:  Vitals:   Vitals:    08/07/18 1323 08/07/18 1639 08/07/18 2030 08/08/18 0600   BP: 154/83 146/76 169/81 154/81   BP Location: Left arm Left arm Left arm Left arm   Pulse: 69 74 69 70   Resp: 20  20 20   Temp: 97 8 °F (36 6 °C)  97 9 °F (36 6 °C) 97 7 °F (36 5 °C)   TempSrc: Oral  Oral Oral   SpO2: 97%  95% 96%   Weight:    107 kg (235 lb 10 8 oz)   Height:           PERRLA EOMI no JVD  RESP: CTAB, no R/R/W  CV: +S1 S2, regular rate, no rubs  ABD: soft, NT, ND, normal BS   EXT:  No edema  Neuro:  Alert and oriented x3  Gravelly voice  Right facial weakness  4/5 strength in the right upper and lower extremity  [ X ] Total time spent: 30 Mins and greater than 50% of this time was spent counseling/coordinating care  ** Please Note: Dragon 360 Dictation voice to text software may have been used in the creation of this document   **

## 2018-08-09 PROBLEM — E11.311 DIABETIC MACULAR EDEMA (HCC): Status: ACTIVE | Noted: 2018-08-09

## 2018-08-09 LAB
ANION GAP SERPL CALCULATED.3IONS-SCNC: 5 MMOL/L (ref 4–13)
BASOPHILS # BLD AUTO: 0.03 THOUSANDS/ΜL (ref 0–0.1)
BASOPHILS NFR BLD AUTO: 0 % (ref 0–1)
BUN SERPL-MCNC: 29 MG/DL (ref 5–25)
CALCIUM SERPL-MCNC: 9 MG/DL (ref 8.3–10.1)
CHLORIDE SERPL-SCNC: 109 MMOL/L (ref 100–108)
CO2 SERPL-SCNC: 25 MMOL/L (ref 21–32)
CREAT SERPL-MCNC: 2.2 MG/DL (ref 0.6–1.3)
EOSINOPHIL # BLD AUTO: 0.14 THOUSAND/ΜL (ref 0–0.61)
EOSINOPHIL NFR BLD AUTO: 2 % (ref 0–6)
ERYTHROCYTE [DISTWIDTH] IN BLOOD BY AUTOMATED COUNT: 13.6 % (ref 11.6–15.1)
GFR SERPL CREATININE-BSD FRML MDRD: 32 ML/MIN/1.73SQ M
GLUCOSE SERPL-MCNC: 112 MG/DL (ref 65–140)
GLUCOSE SERPL-MCNC: 113 MG/DL (ref 65–140)
GLUCOSE SERPL-MCNC: 132 MG/DL (ref 65–140)
GLUCOSE SERPL-MCNC: 141 MG/DL (ref 65–140)
GLUCOSE SERPL-MCNC: 196 MG/DL (ref 65–140)
HCT VFR BLD AUTO: 38.1 % (ref 36.5–49.3)
HGB BLD-MCNC: 12.1 G/DL (ref 12–17)
IMM GRANULOCYTES # BLD AUTO: 0.08 THOUSAND/UL (ref 0–0.2)
IMM GRANULOCYTES NFR BLD AUTO: 1 % (ref 0–2)
LYMPHOCYTES # BLD AUTO: 2.04 THOUSANDS/ΜL (ref 0.6–4.47)
LYMPHOCYTES NFR BLD AUTO: 27 % (ref 14–44)
MCH RBC QN AUTO: 28.5 PG (ref 26.8–34.3)
MCHC RBC AUTO-ENTMCNC: 31.8 G/DL (ref 31.4–37.4)
MCV RBC AUTO: 90 FL (ref 82–98)
MONOCYTES # BLD AUTO: 0.64 THOUSAND/ΜL (ref 0.17–1.22)
MONOCYTES NFR BLD AUTO: 8 % (ref 4–12)
NEUTROPHILS # BLD AUTO: 4.65 THOUSANDS/ΜL (ref 1.85–7.62)
NEUTS SEG NFR BLD AUTO: 62 % (ref 43–75)
NRBC BLD AUTO-RTO: 0 /100 WBCS
PLATELET # BLD AUTO: 160 THOUSANDS/UL (ref 149–390)
PMV BLD AUTO: 11.2 FL (ref 8.9–12.7)
POTASSIUM SERPL-SCNC: 4.2 MMOL/L (ref 3.5–5.3)
RBC # BLD AUTO: 4.25 MILLION/UL (ref 3.88–5.62)
SODIUM SERPL-SCNC: 139 MMOL/L (ref 136–145)
WBC # BLD AUTO: 7.58 THOUSAND/UL (ref 4.31–10.16)

## 2018-08-09 PROCEDURE — 97530 THERAPEUTIC ACTIVITIES: CPT

## 2018-08-09 PROCEDURE — 97116 GAIT TRAINING THERAPY: CPT

## 2018-08-09 PROCEDURE — 82948 REAGENT STRIP/BLOOD GLUCOSE: CPT

## 2018-08-09 PROCEDURE — 97127 HB COGNITIVE SKILLS DEVELOPMENT, EACH 15 MUNUTES: CPT

## 2018-08-09 PROCEDURE — 97535 SELF CARE MNGMENT TRAINING: CPT

## 2018-08-09 PROCEDURE — 97110 THERAPEUTIC EXERCISES: CPT

## 2018-08-09 PROCEDURE — 97150 GROUP THERAPEUTIC PROCEDURES: CPT

## 2018-08-09 PROCEDURE — 97112 NEUROMUSCULAR REEDUCATION: CPT

## 2018-08-09 PROCEDURE — G0515 COGNITIVE SKILLS DEVELOPMENT: HCPCS

## 2018-08-09 PROCEDURE — 99232 SBSQ HOSP IP/OBS MODERATE 35: CPT | Performed by: PHYSICAL MEDICINE & REHABILITATION

## 2018-08-09 PROCEDURE — 80048 BASIC METABOLIC PNL TOTAL CA: CPT | Performed by: PHYSICIAN ASSISTANT

## 2018-08-09 PROCEDURE — 99232 SBSQ HOSP IP/OBS MODERATE 35: CPT | Performed by: INTERNAL MEDICINE

## 2018-08-09 PROCEDURE — 85025 COMPLETE CBC W/AUTO DIFF WBC: CPT | Performed by: PHYSICIAN ASSISTANT

## 2018-08-09 RX ORDER — GABAPENTIN 250 MG/5ML
600 SOLUTION ORAL DAILY
Status: DISCONTINUED | OUTPATIENT
Start: 2018-08-09 | End: 2018-08-13 | Stop reason: HOSPADM

## 2018-08-09 RX ADMIN — INSULIN ASPART 35 UNITS: 100 INJECTION, SUSPENSION SUBCUTANEOUS at 08:03

## 2018-08-09 RX ADMIN — BACITRACIN, NEOMYCIN, POLYMYXIN B 1 SMALL APPLICATION: 400; 3.5; 5 OINTMENT TOPICAL at 19:19

## 2018-08-09 RX ADMIN — CARVEDILOL 6.25 MG: 6.25 TABLET, FILM COATED ORAL at 08:08

## 2018-08-09 RX ADMIN — ATORVASTATIN CALCIUM 80 MG: 80 TABLET, FILM COATED ORAL at 16:54

## 2018-08-09 RX ADMIN — INSULIN LISPRO 1 UNITS: 100 INJECTION, SOLUTION INTRAVENOUS; SUBCUTANEOUS at 21:55

## 2018-08-09 RX ADMIN — AMLODIPINE BESYLATE 5 MG: 5 TABLET ORAL at 08:08

## 2018-08-09 RX ADMIN — GABAPENTIN 600 MG: 250 SUSPENSION ORAL at 16:53

## 2018-08-09 RX ADMIN — CARVEDILOL 6.25 MG: 6.25 TABLET, FILM COATED ORAL at 16:54

## 2018-08-09 RX ADMIN — AMLODIPINE BESYLATE 5 MG: 5 TABLET ORAL at 21:55

## 2018-08-09 RX ADMIN — FAMOTIDINE 20 MG: 20 TABLET ORAL at 08:08

## 2018-08-09 RX ADMIN — ASPIRIN 81 MG: 81 TABLET, COATED ORAL at 08:08

## 2018-08-09 NOTE — PROGRESS NOTES
Occupational Therapy Treatment Note     08/09/18 1235   Pain Assessment   Pain Assessment No/denies pain   Restrictions/Precautions   Precautions Bed/chair alarms;Cognitive; Fall Risk;Impulsive;Supervision on toilet/commode   Lifestyle   Autonomy "I know" - during hand placement    QI: Shower/Bathe Self   Assistance Needed Incidental touching   Assistance Provided by Freedom Less than 25%   Shower/Bathe Self CARE Score 3   Bathing   Assessed Bath Style Shower   Anticipated D/C Bath Style Shower   Able to [place] Yes   Able to [employer] Temperature Yes   Able to Wash/Rinse/Dry (body part) Left Arm;Right Arm;L Upper Leg;R Upper Leg;L Lower Leg/Foot;R Lower Leg/Foot; Abdomen; Chest;Perineal Area; Buttocks   Limitations Noted in Balance; Coordination; Safety;Problem Solving   Positioning Seated;Standing   Findings  Pt requires verbal cues to complete UB and LE bathing while seated 2* impaired memory and safety awareness, however is able to complete at supervision level  Pt requires contact guard assist in stance with unilateral UE support on grab bar to bathe buttock/perineal area 2* impaired dynamic standing balance  Bathing (FIM) 4 - Patient requires steadying assist or light touching   Tub/Shower Transfer   Limitations Noted In Balance; Coordination; Safety;Problem Solving   Adaptive Equipment Grab Bars;Seat with Back   Assessed Shower   Findings Pt completes shower transfer, simulating step over lip at home  OTR/L educated pt and pt's wife on B/L UE support on wall/grab bar while steping over lips of tub   Pt able to complete with contact guard assist, wife demonstrates ability to provide pt with contact guard assist     Shower Transfer (FIM) 4 - Patient requires steadying assist or light touching   QI: Upper Body Dressing   Assistance Needed Incidental touching   Assistance Provided by Freedom Less than 25%   Comment RW   Upper Body Dressing CARE Score 3   QI: Lower Body Dressing   Assistance Needed Incidental touching   Assistance Provided by [place] Less than 25%   Lower Body Dressing CARE Score 3   QI: Putting On/Taking Off Footwear   Assistance Needed Physical assistance   Assistance Provided by [place] 25%-49%   Comment pt's wife assists in donning of sneakers, which she does at baseline    Putting On/Taking Off Footwear CARE Score 3   Dressing/Undressing Clothing   Remove UB Clothes Pullover Shirt   Remove LB [address] [facility]; Undergarment;Socks   [facility]; Undergarment;Socks; Shoes   Limitations Noted In Balance; Coordination;Problem Solving; Safety;Timeliness   Positioning Supported Sit;Standing   Findings Pt requires contact guard assist in stance to retrieve clothing items from closet/drawers 2* impaired dynamic standing balance and impaired safety awareness with use of RW  Pt UB dressing while seated with supervision  Contact guard assist in stance for clothing management over hips, pt's wife assists to don shoes as she reports she does this at baseline  UB Dressing (FIM) 4 - Patient requires steadying assist or light touching   LB Dressing (FIM) 4 - Patient requires steadying assist or light touching   QI: Sit to Stand   Assistance Needed Supervision   Comment RW   Sit to Stand CARE Score 4   QI: Chair/Bed-to-Chair Transfer   Assistance Needed Incidental touching   Assistance Provided by [place] 25%-49%   Comment RW   Chair/Bed-to-Chair Transfer CARE Score 3   Transfer Bed/Chair/Wheelchair   Positioning Concerns Cognition   Limitations Noted In Balance; Sequencing;Problem Solving   Adaptive Equipment Roller Walker   Sit to Stand Supervision   Stand to Sit Supervision   Findings pt requires contact guard assist during functional mobility/transfers, pt noted to progress in functional mobility in straight line, however during turns pt noted with 2x LOB to (R) requiring mod assist to correct   Pt engaged in repetitive functional mobility training with focus on sequencing RW and LE placement through obstacles to improve safety and performance during functional mobility for ADL/IADL tasks  Pt benefits from max verbal instruction of "walker, 1, 2" to slow functional mobility and promote appropriate sequencing during turns to right and left  Pt will benefit from repetitive training of correct sequencing of RW/LE and increasing safety awareness while using RW during functional mobility when navigating obstacles/turns  Bed, Chair, Wheelchair Transfer (FIM) 4 - Patient requires steadying assist or light touching   QI: Toilet Transfer   Assistance Needed Supervision   Toilet Transfer CARE Score 4   Toilet Transfer   Surface Assessed Standard Toilet   Transfer Technique Standard   Findings pt completes sit <> stand transfer from standard toilet 4x with supervision  Toilet Transfer (FIM) 5 - Patient requires supervision/monitoring   Cognition   Overall Cognitive Status Impaired   Comments pt demonstrates progress with correct hand placement during functional transfers s/p repetitive training during OT/PT tx sessions, requiring only min verbal cues to carryover during today's session on/off multiple surfaces  Pt's wife demonstrates ability to verbally cue pt on correct hand placement  OTR/L educated pt's wife on additonally providing only tactile cue to allow pt to have increased time to process need for appropriate hand placement to decrease pt frustration at being verbally instructed constantly  Also discussed need for pt and pt's wife to takes breaks as needed when frustration level increases, both verbalize understanding and report pt feels frustrated with wife providing cues constantly, which then frustrates wife      Activity Tolerance   Activity Tolerance Patient tolerated treatment well   Assessment   Treatment Assessment Pt participated in skilled OT tx session focused on functional mobility/transfer training during turns/while maneuvering obstacles, family training from pt's wife during ADL performance  See above for further details on functional performance  Pt demonstrates progress in new learning s/p repetitive training, requiring only min verbal cues for correct hand placement during today's session  Pt's wife present for entire session and demonstrated ability to provide pt with min assist/contact guard assist during functional mobility/transfers and ADLS  Spoke with pt and pt's wife, OT currently recommending 24/7 supervision based on pt's cognitive deficits Benita Reyez Cognitive Level Screening to be formally completed this weekend), shower chair with back and chair/bed alarm for home as pt is at increased risk of falls 2* cognitive/balance deficits  OTR/L provided pt's wife with examples of shower chair with back and chair alarms from [address] [employer] [address]  com and [employer]  com to use as reference, she reports she will have her children help order this equipment independently  Pt does not require BSC at this time, ordered for RW  Pt noted with multiple LOB to (R) when turning during functional mobility, requiring mod assist to correct  Continue OT plan of care with focus on repetitive functional mobility/transfer training focusing on correct hand placement and navigating obstacles/turns in preparation for community mobility/IADLS and ADLs, item retrieval from closet, drawers, floor (trial use of reacher), simple meal preparation/kitchen mobility  OT Family training done with: wife   Assessment of family training see above for details  Pt's wife demonstrates good understanding of education provided and reports she feels comfortable assisting pt in ADLS and will purchase chair alarm and shower chair with back independently    Plan   Treatment/Interventions ADL retraining;Functional transfer training;Cognitive reorientation; Endurance training; Therapeutic exercise;Patient/family training;Equipment eval/education; Compensatory technique education   Progress Progressing toward goals   Recommendation   OT Equipment ordered OT recommending shower chair with back and chair alarm - wife will purchase independently    OT Therapy Minutes   OT Time In 1235   OT Time Out 1400   OT Total Time (minutes) 85   OT Mode of treatment - Individual (minutes) 85   OT Mode of treatment - Concurrent (minutes) 0   OT Mode of treatment - Group (minutes) 0   OT Mode of treatment - Co-treat (minutes) 0   OT Mode of Teatment - Total time(minutes) 85 minutes   Therapy Time missed   Time missed?  No       Mona Willson MS, OTR/L , CBIS

## 2018-08-09 NOTE — SOCIAL WORK
In preparation for dc cm received order for a roller walker, order placed with [facility] to be delivered to pts room prior to dc  Following for additional dc needs

## 2018-08-09 NOTE — PROGRESS NOTES
Contacted Dr Vera Velarde r/t patient Bl glucose level showing same pattern as previous day, which resulted in lower results at bedtime  70/30 held at this time   patient ate all of meal

## 2018-08-09 NOTE — PROGRESS NOTES
[address] [facility] NOTE   Alicia Platt 62 y o  male MRN: [id number]  Unit/Bed#: [facility] 840-51 Encounter: [id number]  Reason for Consult: ADEOLA on CKD    ASSESSMENT and PLAN:    62year old male with a past medical history of uncontrolled diabetes type 2, neuropathy, hypertension, hyperlipidemia, who initially presented to the hospital on 07/29 and was found to have acute left-sided CVA after presenting with slurred speech facial droop and right hemiparesis  Patient received tPA, which was complicated by small intracranial hemorrhage  Patient is now in the [facility] unit  Nephrology is consulted for acute kidney injury     1) acute on chronic kidney injury-    -patient follows with Dr Amado Jacobsen   -baseline creatinine 1 9-2 2 with recent baseline 2 2-2 4  -compliance was an issue in the past  -patient had a contrast on 07/29  -urinalysis-glucose positive, 4+ protein, 2-4 RBC   -patient has yet to complete the serologies that were ordered in the outpatient setting  - will order serologies now - SPEP, C3, C4, SOLEDAD, ANCA, UPCR  - I/O  - avoid nephrotoxic agents   - Cr stable 8/9  - agree with Monday and Thursday BMP's     2) CVA - as per Primary Team     3)hypertension      - continue carvedilol  - added amlodipine 8/6 and increased to BID on 8/8  - hold parameters in place  - in future, may transition amlodipine to ACEi     5) acid/base - bicarb stable     6) electrolytes - stable     - monitor K  - low K diet     7) RLE lesion     - healing    SUBJECTIVE / INTERVAL HISTORY:    Pt denies complaints  SBP improving overall       OBJECTIVE:  Current Weight: Weight - Scale: 107 kg (235 lb 10 8 oz)  Vitals:    08/08/18 0848 08/08/18 1329 08/08/18 2005 08/09/18 0500   BP: 160/77 157/79 124/84 157/79   BP Location:  Left arm Left arm Left arm   Pulse:  71 69 74   Resp:  20 19 18   Temp:  (!) 97 4 °F (36 3 °C) 97 5 °F (36 4 °C) 97 9 °F (36 6 °C)   TempSrc:  Oral Oral Oral   SpO2:  96% 94% 95%   Weight:       Height: Intake/Output Summary (Last 24 hours) at 08/09/18 0759  Last data filed at 08/08/18 1700   Gross per 24 hour   Intake              700 ml   Output                0 ml   Net              700 ml     General: NAD  Skin: no rash  Eyes: anicteric sclera  ENT: moist mucous membrane  Neck: supple  Chest: CTA b/l, no ronchii, rales, wheezes  CVS: s1s2  Abdomen: soft, nontender, distention   Extremities: no edema LE;  : no chávez  Neuro: AAXO3  Psych: normal affect    Medications:    Current Facility-Administered Medications:     acetaminophen (TYLENOL) tablet 650 mg, 650 mg, Oral, Q6H PRN, Jessica Fang MD, 650 mg at 08/07/18 1603    amLODIPine (NORVASC) tablet 5 mg, 5 mg, Oral, Q12H, Emily Craft MD, 5 mg at 08/08/18 2051    aspirin (ECOTRIN LOW STRENGTH) EC tablet 81 mg, 81 mg, Oral, Daily, Jessica Fang MD, 81 mg at 08/08/18 0851    atorvastatin (LIPITOR) tablet 80 mg, 80 mg, Oral, Daily With Rody Logan MD, 80 mg at 08/08/18 1627    carvedilol (COREG) tablet 6 25 mg, 6 25 mg, Oral, BID With Meals, Emily Craft MD, 6 25 mg at 08/08/18 1627    famotidine (PEPCID) tablet 20 mg, 20 mg, Oral, Daily, Jessica Fang MD, 20 mg at 08/08/18 0851    gabapentin (NEURONTIN) capsule 600 mg, 600 mg, Oral, Daily, Jessica Fang MD, 600 mg at 08/08/18 0848    insulin aspart protamine-insulin aspart (NovoLOG 70/30) 100 units/mL subcutaneous injection 35 Units, 35 Units, Subcutaneous, BID AC, Jessica Fang MD, 35 Units at 08/08/18 1631    insulin lispro (HumaLOG) 100 units/mL subcutaneous injection 1-5 Units, 1-5 Units, Subcutaneous, TID AC, 1 Units at 08/07/18 1647 **AND** Fingerstick Glucose (POCT), , , TID ACJessica MD    insulin lispro (HumaLOG) 100 units/mL subcutaneous injection 1-5 Units, 1-5 Units, Subcutaneous, HS, Jessica Fang MD, 1 Units at 08/05/18 2121    neomycin-bacitracin-polymyxin b (NEOSPORIN) ointment 1 small application, 1 small application, Topical, BID, Karen Taylor, ALLISON, 1 small application at 04/59/04 1710    polyethylene glycol (MIRALAX) packet 17 g, 17 g, Oral, Daily PRN, Laquita Barry MD    Laboratory Results:    Results from last 7 days  Lab Units 08/09/18  0525 08/07/18  6574 08/06/18  9037 08/05/18  0535 08/04/18  0539 08/03/18  0625   WBC Thousand/uL 7 58  --  8 33  --   --   --    HEMOGLOBIN g/dL 12 1  --  12 2  --   --   --    HEMATOCRIT % 38 1  --  38 2  --   --   --    PLATELETS Thousands/uL 160  --  156  --   --   --    SODIUM mmol/L 139 141 142 141 138 139   POTASSIUM mmol/L 4 2 4 8 4 5 4 5 4 2 4 5   CHLORIDE mmol/L 109* 107 109* 109* 106 107   CO2 mmol/L 25 28 26 25 26 28   BUN mg/dL 29* 30* 31* 30* 30* 26*   CREATININE mg/dL 2 20* 2 29* 2 32* 2 49* 2 64* 2 47*   CALCIUM mg/dL 9 0 9 0 9 0 8 7 9 0 8 8   GLUCOSE RANDOM mg/dL 112 109 106 123 122 102

## 2018-08-09 NOTE — PROGRESS NOTES
Internal Medicine Progress Note  Patient: Gregorio Woodall  Age/sex: 62 y o  male  Medical Record #: [id number]      ASSESSMENT/PLAN:  Gregorio Woodall is seen and examined and mangement for following issues:    # L CVA:  S/p tPA w/small ICH post infusion  Continue ASA/Statin as well as aggressive lifestyle modifications; aggressive therapy per PMR  Outpt Neurology follow-up       # DM II:  Previously uncontrolled d/t non compliance; will continue Novolog 70/30 35U 2x daily  Continue SSI and accuchecks  Adjust medications as needed  Need to keep much tighter control - was discussed with pt's wife and pt     # CKD III:  Previous baseline was 1 8-2 2, now more like 2 0-2 4  Currently off ACE inhibitor, can transition amlodipine to ACE once creatinine shows stability per renal      # HTN:  Cont Coreg 6 25 mg 2 times daily  Renal added amlodipine 5mg bid; will monitor trend over next 24 hours, if remains elevated may need to add another agent     # h/o spinal stimulator:  Stable     # Non compliance:  Cont to stress the importance of compliance    # DM Neuropathy:  Taking gabapentin but refused today 2/2 to difficulty swallowing pill, will switch to liquid        Subjective: Patient seen and examined  Wife at bedside, taking pt to opthamologist appt   No other complaints    ROS:   GI: denies abdominal pain, change bowel habits or reflux symptoms  Neuro: No new neurologic changes  Respiratory: No Cough, SOB  Cardiovascular: No CP, palpitations     Scheduled Meds:    Current Facility-Administered Medications:  acetaminophen 650 mg Oral Q6H PRN Cathy Lepe MD   amLODIPine 5 mg Oral Q12H Gene Sanchez MD   aspirin 81 mg Oral Daily Cathy Lepe MD   atorvastatin 80 mg Oral Daily With Jackelyn Boyce MD   carvedilol 6 25 mg Oral BID With Meals Gene Sanchez MD   famotidine 20 mg Oral Daily Cathy Lepe MD   gabapentin 600 mg Oral Daily Cathy Lepe MD   insulin aspart protamine-insulin aspart 35 Units Subcutaneous BID ALMAS Chris MD   insulin lispro 1-5 Units Subcutaneous TID AC Federico Chris MD   insulin lispro 1-5 Units Subcutaneous HS Federico Chris MD   neomycin-bacitracin-polymyxin b 1 small application Topical BID Karen Taylor PA-C   polyethylene glycol 17 g Oral Daily PRN Federico Chris MD       Labs:       Results from last 7 days  Lab Units 08/09/18  0525 08/06/18  0521   WBC Thousand/uL 7 58 8 33   HEMOGLOBIN g/dL 12 1 12 2   HEMATOCRIT % 38 1 38 2   PLATELETS Thousands/uL 160 156       Results from last 7 days  Lab Units 08/09/18  0525 08/07/18  0555   SODIUM mmol/L 139 141   POTASSIUM mmol/L 4 2 4 8   CHLORIDE mmol/L 109* 107   CO2 mmol/L 25 28   BUN mg/dL 29* 30*   CREATININE mg/dL 2 20* 2 29*   GLUCOSE RANDOM mg/dL 112 109   CALCIUM mg/dL 9 0 9 0                  Glucose (mg/dL)   Date Value   08/09/2018 112   08/07/2018 109   08/06/2018 106   08/05/2018 123   01/03/2016 189 (H)   01/02/2016 178 (H)   01/01/2016 113   12/31/2015 128     Glucose, i-STAT (mg/dl)   Date Value   07/29/2018 213 (H)       Labs reviewed    Physical Examination:  Vitals:   Vitals:    08/08/18 0848 08/08/18 1329 08/08/18 2005 08/09/18 0500   BP: 160/77 157/79 124/84 157/79   BP Location:  Left arm Left arm Left arm   Pulse:  71 69 74   Resp:  20 19 18   Temp:  (!) 97 4 °F (36 3 °C) 97 5 °F (36 4 °C) 97 9 °F (36 6 °C)   TempSrc:  Oral Oral Oral   SpO2:  96% 94% 95%   Weight:       Height:           PERRLA EOMI no JVD  RESP: CTAB, no R/R/W  CV: +S1 S2, regular rate, no rubs  ABD: soft, NT, ND, normal BS   EXT:  No edema  Neuro:  Alert and oriented x3  Gravelly voice  Right facial weakness  4/5 strength in the right upper and lower extremity  [ X ] Total time spent: 30 Mins and greater than 50% of this time was spent counseling/coordinating care  ** Please Note: Dragon 360 Dictation voice to text software may have been used in the creation of this document   **

## 2018-08-09 NOTE — ASSESSMENT & PLAN NOTE
· Patient with history of diabetic macular edema, in addition to diabetic retinopathy, vitreous hemorrhage, nuclear sclerosis  · Receives monthly Lucentis injections at [facility]  Originally had appointment for repeat injections on 08/09; Office was called and discussion had with Dr Dilcia Valdovinos, he stated injections are non-urgent and can wait until after discharge next week  Rescheduled appointment with Dr Emperatriz Crawford for 8/14/18 at 3pm  In addition, [facility] team member will call family to inform them of appointment change and provide re-assurance

## 2018-08-09 NOTE — PROGRESS NOTES
08/09/18 0815   Pain Assessment   Pain Assessment No/denies pain   Pain Score No Pain   Restrictions/Precautions   Precautions Bed/chair alarms; Fall Risk;Supervision on toilet/commode;Cognitive; Impulsive   Weight Bearing Restrictions No   ROM Restrictions No   Cognition   Overall Cognitive Status Impaired   Arousal/Participation Alert; Cooperative   Attention Attends with cues to redirect   Orientation Level Oriented to person;Oriented to place   Memory Decreased recall of precautions;Decreased recall of recent events;Decreased short term memory   Following Commands Follows one step commands without difficulty   Subjective   Subjective Pt has no complaints, is agreeable to PT after he finishes his breakfast    QI: Roll Left and Right   Assistance Needed Set-up / clean-up   Roll Left and Right CARE Score 5   QI: Sit to [address] / clean-up   Sit to Lying CARE Score 5   QI: Lying to Sitting on Side of Bed   Assistance Needed Set-up / clean-up   Lying to Sitting on Side of Bed CARE Score 5   QI: Sit to Stand   Assistance Needed Incidental touching   Comment CS-CG   Sit to Stand CARE Score 4   Bed Mobility   Able to Roll Left to Right;Right to Left;Scoot Up   Positioning Concerns Cognition   Findings DS   QI: Chair/Bed-to-Chair Transfer   Assistance Needed Incidental touching   Comment CS-CG    Chair/Bed-to-Chair Transfer CARE Score 4   Transfer Bed/Chair/Wheelchair   Limitations Noted In Balance; Coordination;Problem Solving; Sequencing;UE Strength;LE Strength   Adaptive Equipment Roller Walker   Stand Pivot Supervision;Contact Guard   Sit to [employer]   Stand to [employer]   Supine to [employer]   Sit to Supine Supervision   Car Transfer Contact Guard   Findings CG-S; pt requires cueing for hand placement about 50% of the time this session    Bed, Chair, Wheelchair Transfer (FIM) 4 - Patient requires steadying assist or light touching   QI: Car Transfer   Assistance Needed Incidental touching   Comment CG; VC for hand placement and proper technique- pt tends to abandon RW and try to climb in the car despite max cueing on proper technique    Car Transfer CARE Score 4   QI: Walk 10 Feet   Assistance Needed Incidental touching   Walk 10 Feet CARE Score 4   QI: Walk 50 Feet with Two Turns   Assistance Needed Incidental touching   Walk 50 Feet with Two Turns CARE Score 4   QI: Walk 150 Feet   Assistance Needed Incidental touching   Walk 150 Feet CARE Score 4   QI: Walking 10 Feet on Uneven Surfaces   Assistance Needed Incidental touching   Walking 10 Feet on Uneven Surfaces CARE Score 4   Ambulation   Does the patient walk? 2  Yes   Primary Discharge Mode of Locomotion Walk   Walk Assist Level Contact Guard   Gait Pattern Inconsistant Prachi;Decreased foot clearance; Improper weight shift   Assist Device Roller Dorothy Church Walked (feet) 150 ft  (x3; 350)   Limitations Noted In Balance;Device Management; Endurance; Safety; Sequencing;Speed;Strength   Findings Pt ambulates with CS-CG; pt wife present at end of session and provides CG assist    Walking (FIM) 4 - Patient requires steadying assist or light touching AND distance 150 feet or more, no rest   Wheelchair mobility   QI: Does the patient use a wheelchair? 0  No   QI: 1 Step (Curb)   Assistance Needed Incidental touching   Comment CG with RW   1 Step (Curb) CARE Score 4   QI: 4 Steps   Assistance Needed Incidental touching   4 Steps CARE Score 4   Stairs   Type Stairs;Curb;Ramp  ()   # of Steps 6   Weight Bearing Precautions Fall Risk   Assist Devices Bilateral Rail   Findings CG provided by wife- mitraby A of PT for safety   Pt requires cueing for sequencing with minimal and inconsistent carry over noted    Stairs (FIM) 4 - Patient requires steadying assist or light touching AND patient goes up and down full flight (12- 14 stairs)   QI: Picking Up Object   Assistance Needed Incidental touching Picking Up Object CARE Score 4   QI: Toilet Transfer   Assistance Needed Incidental touching   Comment Standing at toilet    Toilet Transfer CARE Score 4   Toilet Transfer   Surface Assessed Standard Toilet   Limitations Noted In Balance; Endurance; Sequencing;UE Strength;LE Strength   Adaptive Equipment Grab Bar   Positioning Concerns Cognition   Findings CG for tolilet transfer and hygeine    Toilet Transfer (FIM) 4 - Patient requires steadying assist or light touching   Therapeutic Interventions   Strengthening standing TE: hip abduction x20, marches x30, mini squats x20  seated LAQ 3x10 with 3 second hold    Flexibility BL gastroc stretch x2min    Balance walking with no RW and picking up specific colored cones to work on memory and balance; toe taps onto box x20 wiht no UE support    Assessment   Treatment Assessment PT session focused on carry over of hand placement during transfers, balance and strengthening LE  Pt demonstrates decreased carry over of hand placement/safety techniques with RW  Session included many STS and SPT to and from different surfaces to assess and reinforce correct hand placement  When transferring to a new surface there is no carry over of hand placement  Pt continues to require cueing about 50% of the time when transferring to familiar surface  Pt's wife was present towards end of session, hands on FT for stair negotiation was performed- wife provided CG and cues for sequencing with standby assist of PT for safety  PT will continue to focus on balance, family training, and safety with functional mobility to maximize independence at d/c  Family/Caregiver [facility]    PT Family training done with: hands on FT for stair negotiation    Problem List Decreased range of motion;Decreased endurance; Impaired balance;Decreased mobility; Decreased coordination;Decreased cognition; Impaired judgement;Decreased safety awareness; Obesity   Barriers to Discharge Inaccessible home environment;Decreased caregiver support   PT Barriers   Physical Impairment Impaired balance;Decreased strength;Decreased endurance;Decreased cognition; Impaired judgement;Decreased safety awareness;Decreased coordination;Obesity; Impaired vision   Functional Limitation Walking;Stair negotiation;Car transfers; Ramp negotiation;Standing;Transfers   Plan   Treatment/Interventions Functional transfer training;LE strengthening/ROM; Elevations; Therapeutic exercise;Gait training;Bed mobility; Patient/family training   Progress Progressing toward goals   Recommendation   Recommendation Outpatient PT; Home with family support;24 hour supervision/assist   Equipment Recommended Walker   PT - OK to Discharge No   PT Therapy Minutes   PT Time In 0815   PT Time Out 0930   PT Total Time (minutes) 75   PT Mode of treatment - Individual (minutes) 75   PT Mode of treatment - Concurrent (minutes) 0   PT Mode of treatment - Group (minutes) 0   PT Mode of treatment - Co-treat (minutes) 0   PT Mode of Teatment - Total time(minutes) 75 minutes   Therapy Time missed   Time missed?  No

## 2018-08-09 NOTE — PROGRESS NOTES
Patient HS blood sugar 72, given snack consisting of 120mL orange juice and chocolate ice cream  Blood sugar rechecked at 2159, reading of 83 noted, patient then given 120mL of orange with gareth crackers and peanut butter  Blood sugar rechecked at 2230 with a reading of 127 noted  Patient asymptomatic, will continue to monitor

## 2018-08-09 NOTE — CONSULTS
Consultation/Progress note - Mal Ronak 62 y o  male MRN: [id number]    Unit/Bed#: -54 Encounter: [id number]      Assessment/Plan     Assessment:  Pt participated in psychology services to address coping skills, adjustment, and motivation in rehab  Met with pt and his wife  Pt presented in euthymic mood, full range affect  Discussed progress on rehab goals; discussed tentative dc date and continued rehab needs; explored how family and pt wife can be supportive to pt  in his home  Provided supportive counseling  Pt and his wife were engaged and cooperative; they are realistic of pt limitations after dc  Pt continues to be motivated in rehab sessions and is making progress on addressing goals  Dx: F43 23 Adjustment disorder with depressed and anxious mood  Code: 36165        Plan:  Continue psychology services while pt is at [facility]  Consults    Review of Systems    Historical Information   Past Medical History:   Diagnosis Date    Diabetes mellitus ([facility] )     Hypercholesteremia     Hyperlipidemia     Hypertension     Neuropathy     Obesity     Osteomyelitis ([facility] )     last assessed 11/4/16    PVC's (premature ventricular contractions)     sees cardiology Dr Smiley camargo     Past Surgical History:   Procedure Laterality Date    ABDOMINAL SURGERY      CHOLECYSTECTOMY      Percutaneous    OTHER SURGICAL HISTORY      "stimulator to control bowel movements"    TX ESOPHAGOGASTRODUODENOSCOPY TRANSORAL DIAGNOSTIC N/A 9/27/2016    Procedure: ESOPHAGOGASTRODUODENOSCOPY (EGD); Surgeon: Ladarius Wood MD;  [facility];   Service: Gastroenterology    TX LAP,CHOLECYSTECTOMY N/A 2/29/2016    Procedure: LAPAROSCOPIC CHOLECYSTECTOMY ;  Surgeon: Toribio Curling, DO;  [facility];  Service: General    ROTATOR CUFF REPAIR Right     TOE AMPUTATION Right 10/28/2016    Procedure: 3RD TOE AMPUTATION ;  Surgeon: Gerardo Estrada DPM;  [facility];  Service:      Social History History   Alcohol Use No     History   Drug Use No     History   Smoking Status    Never Smoker   Smokeless Tobacco    Never Used   Meds/Allergies   current meds:   Current Facility-Administered Medications   Medication Dose Route Frequency    acetaminophen (TYLENOL) tablet 650 mg  650 mg Oral Q6H PRN    amLODIPine (NORVASC) tablet 5 mg  5 mg Oral Q12H    aspirin (ECOTRIN LOW STRENGTH) EC tablet 81 mg  81 mg Oral Daily    atorvastatin (LIPITOR) tablet 80 mg  80 mg Oral Daily With Dinner    carvedilol (COREG) tablet 6 25 mg  6 25 mg Oral BID With Meals    famotidine (PEPCID) tablet 20 mg  20 mg Oral Daily    gabapentin (NEURONTIN) capsule 600 mg  600 mg Oral Daily    insulin aspart protamine-insulin aspart (NovoLOG 70/30) 100 units/mL subcutaneous injection 35 Units  35 Units Subcutaneous BID AC    insulin lispro (HumaLOG) 100 units/mL subcutaneous injection 1-5 Units  1-5 Units Subcutaneous TID AC    insulin lispro (HumaLOG) 100 units/mL subcutaneous injection 1-5 Units  1-5 Units Subcutaneous HS    neomycin-bacitracin-polymyxin b (NEOSPORIN) ointment 1 small application  1 small application Topical BID    polyethylene glycol (MIRALAX) packet 17 g  17 g Oral Daily PRN     No Known Allergies    Objective   Vitals: Blood pressure 157/79, pulse 74, temperature 97 9 °F (36 6 °C), temperature source Oral, resp  rate 18, height 5' 8" (1 727 m), weight 107 kg (235 lb 10 8 oz), SpO2 95 %      Intake/Output Summary (Last 24 hours) at 08/09/18 0955  Last data filed at 08/09/18 0900   Gross per 24 hour   Intake              580 ml   Output                0 ml   Net              580 ml     Invasive Devices          No matching active lines, drains, or airways          Physical Exam

## 2018-08-09 NOTE — PROGRESS NOTES
Stroke Education Series    Pt  And wife participated in skilled Stroke Education Series in a group setting to address the topic of Stroke 101: Understanding the Basics of Stroke in both verbal and written formats  Education within this session reviewed the basic structural/functional components of the brain and included information on the causes of stroke, related signs/symptoms, risk factors, and the process of stroke rehabilitation  The goal of this education was to provide the patient with general understanding of how the brain functions and how a stroke can impact his/her performance  In addition, this series aimed to provide the patient with the information that can help reduce the risk of sustaining another stroke  Following education, pt's response to education is: verbalizes understanding        Start Time: 1430    End Time: 0510

## 2018-08-09 NOTE — PROGRESS NOTES
Physical Medicine and Rehabilitation Progress Note  Rick Cifuentes 62 y o  male MRN: [id number]  Unit/Bed#: [facility] 305-35 Encounter: [id number]    HPI: Rick Cifuentes is a 62 y o  male who presented to the [address] with Rt sided weakness, Rt facial droop, and slurred speech  Patient was given TPA and responded well and does not currently have paresis, slurred speech, or facial droop  Subjective: No acute events overnight  Family concerned missing Lucentis injection that he receives at [facility]  I called and discussed with ophthalmologist, Dr Kathy Taylor, who assured me that injections were non-urgent and could be rescheduled for next week after patient is discharged  One of Dr Joaquin Thao team members will be calling family to re-assure  I let patient and SO know about my conversation, and they seemed satisfied with going next week for appointment  Patient otherwise denies any complaints  No CP or SOB  No new weakness, numbness/tingling  ROS:  General ROS: negative for - chills or fever  Psychological ROS: negative for - anxiety or depression  Ophthalmic ROS: positive for - Left eye ptosis  Respiratory ROS: no cough, shortness of breath, or wheezing  Cardiovascular ROS: no chest pain or dyspnea on exertion  Gastrointestinal ROS: no abdominal pain, change in bowel habits, or black or bloody stools  Genito-Urinary ROS: no dysuria, trouble voiding, or hematuria  Musculoskeletal ROS: negative for - muscle pain  Neurological ROS: negative for - numbness/tingling     Assessment/Plan:      Diabetic macular edema (HCC)   Assessment & Plan    · Patient with history of diabetic macular edema, in addition to diabetic retinopathy, vitreous hemorrhage, nuclear sclerosis  · Receives monthly Lucentis injections at [facility]   Originally had appointment for repeat injections today; I called and discussed with Dr Kathy Taylor, he stated injections are non-urgent and can wait until after discharge next week  Made appointment with Dr Phil Grande for 8/14/18 at 3pm  In addition[facility] will call family to assure them of appointment change           CKD (chronic kidney disease)   Assessment & Plan      Results from last 7 days  Lab Units 08/09/18  0525 08/07/18  0555 08/06/18  0521   CREATININE mg/dL 2 20* 2 29* 2 32*     · Baseline Cr appears to be 2 0-2 25, renal service managing as inpt (follows with Dr Tracy Mena as OP)        Elevated alkaline phosphatase level   Assessment & Plan    Chronically elevated going back 1 year, currently mildly elevated at 133 ()         Cognitive impairment   Assessment & Plan    Per family present x 1 year, OP [facility] for further testing/management         Neuropathy   Assessment & Plan    Likely 2/2 to DM, on home neurontin 600 qd        Essential hypertension   Assessment & Plan    · Home coreg increased from 3 125 to 6 25 mg BID, no further changes today  · Home norvasc increased from 5 mg qd to q12, no changes today  · Renal managing            Hyperlipidemia   Assessment & Plan    · Home lovastatin 20 mg qpm changed to lipitor 80 mg qpm per neuro for CVA        Type 2 diabetes mellitus with hyperglycemia (HCC)   Assessment & Plan    · On home novolog 70/30 35 units BID, no changes today        * Ischemic cerebrovascular accident (CVA) ([facility] [address] )   Assessment & Plan    · On home ASA 81 mg qd  · Home lovastatin 20 mg qpm changed to lipitor 80 mg qpm per neuro           Scheduled Meds:    Current Facility-Administered Medications:  acetaminophen 650 mg Oral Q6H PRN Gogo Alonso MD   amLODIPine 5 mg Oral Q12H Anna Stauffer MD   aspirin 81 mg Oral Daily Gogo Alonso MD   atorvastatin 80 mg Oral Daily With Rachele Cabrera MD   carvedilol 6 25 mg Oral BID With Meals Anna Stauffer MD   famotidine 20 mg Oral Daily Gogo Alonso MD   gabapentin 600 mg Oral Daily Neha Amin MD   insulin aspart protamine-insulin aspart 35 Units Subcutaneous BID AC Neha Amin MD   insulin lispro 1-5 Units Subcutaneous TID AC Neha Amin MD   insulin lispro 1-5 Units Subcutaneous HS Neha Amin MD   neomycin-bacitracin-polymyxin b 1 small application Topical BID Karen Taylor PA-C   polyethylene glycol 17 g Oral Daily PRN Neha Amin MD        DVT ppx: SCDs b/l, patient ambulating sufficient distances  Dispo: 8/13     Will defer to PCP with further testing/treatment and/or specialist referral at PCP's discretion      Incidental findings noted on CT A/P 8/2016:  1) Rt renal cysts  2) cirrhosis/portal hypertension  3) splenomegaly     Other incidental findings:  1) grade I DD  2) Rt carotid and B/L vertebral artery stenosis    Objective:    Functional Update:  Mobility: min-cg  Transfers: cg-sup  ADLs: min-cg    Allergies per EMR    Physical Exam:    General: alert, no apparent distress, cooperative and comfortable  HEENT:  Head: Normocephalic, no lesions, without obvious abnormality  CARDIAC:  +s1/2  LUNGS:  respirations unlabored   ABDOMEN:  soft NT   EXTREMITIES:  no significant LE edema   NEURO:   awake, alert, approriately answering questions   PSYCH:  mood/affect currently stable       Diagnostic Studies: reviewed, no new imaging  Ct Head Wo Contrast    Result Date: 8/5/2018  Impression: Expected evolution of the known ischemic infarct in the left basal ganglion since prior CT  No hemorrhagic conversion  No new ischemic infarcts are seen  No new intraparenchymal hemorrhages are seen  No hydrocephalus   Workstation performed: [id number]       Laboratory:      Results from last 7 days  Lab Units 08/09/18  0525 08/06/18  0521   HEMOGLOBIN g/dL 12 1 12 2   HEMATOCRIT % 38 1 38 2   WBC Thousand/uL 7 58 8 33       Results from last 7 days  Lab Units 08/09/18  0525 08/07/18  0555 08/06/18  0521   BUN mg/dL 29* 30* 31*   SODIUM mmol/L 139 141 142   POTASSIUM mmol/L 4 2 4 8 4 5 CHLORIDE mmol/L 109* 107 109*   GLUCOSE RANDOM mg/dL 112 109 106   CREATININE mg/dL 2 20* 2 29* 2 32*            Patient Active Problem List   Diagnosis    Type 2 diabetes mellitus with hyperglycemia (HCC)    Hyperlipidemia    Essential hypertension    [facility]    Neuropathy    Cognitive impairment    Elevated alkaline phosphatase level    CKD (chronic kidney disease)     Total time spent: Minimum 25 minutes was spent face-to-face with the patient

## 2018-08-09 NOTE — PROGRESS NOTES
18 1000   Pain Assessment   Pain Assessment No/denies pain   Restrictions/Precautions   Precautions Bed/chair alarms;Cognitive; Fall Risk;Impulsive;Supervision on toilet/commode   Memory Skills   Memory (FIM) 2 - Recalls 1 of 2 steps   Social Interaction (FIM) 5 - Interacts appropriately with others 90% of time   Speech/Language/Cognition Assessmetn   Treatment Assessment Session focused initially on completing basic financial management tasks, given simple addition of coins, where pt was 26/30 accurate w/ task, increasing to 30/30 upon review of errors  When completing task given dollar and coin amounts, where pt was to determine the total dollar and then breaking coin amounts into quarters, dimes, nickels, pennies, where pt was 25/30 accurate  SLP assisted in writing out amounts in completing task, where pt required increased assistance to calculate coin amount appropriately  When engaged in [facility] recall given 4 words to recall by category inclusion, pt was 5/10 accurate, increasing to 7/10 given semantic cue, increasing to 9/10 given repetition of words and 10/10 given visual cue  When completing similar task given 4 words to recall by attribute inclusion where pt was 5/7 accurate  Attempted to trial visual memory recall given pictures (total of 12) to recall in 3 categories of 4 items, where pt was only able to recall 5/12 accurate, incrasing to 12/12 given increased semantic cues by SLP and pt's wife  Pt remains only oriented to self, needing cue to look at calendar for date, increased verbal cues for place and situation  Pt was able to recall address,  and children's names, but unable to recall age, but upon review, pt able to improve recall of age  Educated pt and pt's wife on "slowing down" when engaging in these activities to improve thouroughness of tasks, etc  Additionally educated on LTM recall vs  STM recall and why LTM is still "good" vs  overall decrease in STM recall abilities at this time  Will continue to benefit from SLP serivces at this time to maximize skills at this time  SLP Therapy Minutes   SLP Time In 1000   SLP Time Out 1100   SLP Total Time (minutes) 60   SLP Mode of treatment - Individual (minutes) 60   SLP Mode of treatment - Concurrent (minutes) 0   SLP Mode of treatment - Group (minutes) 0   SLP Mode of treatment - Co-treat (minutes) 0   SLP Mode of Teatment - Total time(minutes) 60 minutes   Therapy Time missed   Time missed?  No   Daily FIM Score   Problem solving (FIM) 3 - Solves basic problmes 50-74% of time   Comprehension (FIM) 4 - Understands basic info/conversation 75-90% of time   Expression (FIM) 4 - Expresses basic info/needs 75-90% of time

## 2018-08-10 LAB
25(OH)D2 SERPL-MCNC: <1 NG/ML
25(OH)D3 SERPL-MCNC: 19 NG/ML
25(OH)D3+25(OH)D2 SERPL-MCNC: 19 NG/ML
C3 SERPL-MCNC: 149 MG/DL (ref 90–180)
C4 SERPL-MCNC: 34 MG/DL (ref 10–40)
CREAT UR-MCNC: 106 MG/DL
GLUCOSE SERPL-MCNC: 125 MG/DL (ref 65–140)
GLUCOSE SERPL-MCNC: 139 MG/DL (ref 65–140)
GLUCOSE SERPL-MCNC: 160 MG/DL (ref 65–140)
GLUCOSE SERPL-MCNC: 97 MG/DL (ref 65–140)
PROT UR-MCNC: 459 MG/DL
PROT/CREAT UR: 4.33 MG/G{CREAT} (ref 0–0.1)

## 2018-08-10 PROCEDURE — G0515 COGNITIVE SKILLS DEVELOPMENT: HCPCS

## 2018-08-10 PROCEDURE — 97530 THERAPEUTIC ACTIVITIES: CPT

## 2018-08-10 PROCEDURE — 97127 HB COGNITIVE SKILLS DEVELOPMENT, EACH 15 MUNUTES: CPT

## 2018-08-10 PROCEDURE — 84166 PROTEIN E-PHORESIS/URINE/CSF: CPT | Performed by: INTERNAL MEDICINE

## 2018-08-10 PROCEDURE — 97112 NEUROMUSCULAR REEDUCATION: CPT

## 2018-08-10 PROCEDURE — 84165 PROTEIN E-PHORESIS SERUM: CPT | Performed by: PATHOLOGY

## 2018-08-10 PROCEDURE — 84166 PROTEIN E-PHORESIS/URINE/CSF: CPT | Performed by: PATHOLOGY

## 2018-08-10 PROCEDURE — 97110 THERAPEUTIC EXERCISES: CPT

## 2018-08-10 PROCEDURE — 82570 ASSAY OF URINE CREATININE: CPT | Performed by: INTERNAL MEDICINE

## 2018-08-10 PROCEDURE — 86160 COMPLEMENT ANTIGEN: CPT | Performed by: INTERNAL MEDICINE

## 2018-08-10 PROCEDURE — 86255 FLUORESCENT ANTIBODY SCREEN: CPT | Performed by: INTERNAL MEDICINE

## 2018-08-10 PROCEDURE — 84156 ASSAY OF PROTEIN URINE: CPT | Performed by: INTERNAL MEDICINE

## 2018-08-10 PROCEDURE — 86038 ANTINUCLEAR ANTIBODIES: CPT | Performed by: INTERNAL MEDICINE

## 2018-08-10 PROCEDURE — 83883 ASSAY NEPHELOMETRY NOT SPEC: CPT | Performed by: INTERNAL MEDICINE

## 2018-08-10 PROCEDURE — 82948 REAGENT STRIP/BLOOD GLUCOSE: CPT

## 2018-08-10 PROCEDURE — 84165 PROTEIN E-PHORESIS SERUM: CPT | Performed by: INTERNAL MEDICINE

## 2018-08-10 PROCEDURE — 99232 SBSQ HOSP IP/OBS MODERATE 35: CPT | Performed by: PHYSICAL MEDICINE & REHABILITATION

## 2018-08-10 PROCEDURE — 97116 GAIT TRAINING THERAPY: CPT

## 2018-08-10 RX ADMIN — GABAPENTIN 600 MG: 250 SUSPENSION ORAL at 10:15

## 2018-08-10 RX ADMIN — CARVEDILOL 6.25 MG: 6.25 TABLET, FILM COATED ORAL at 07:54

## 2018-08-10 RX ADMIN — CARVEDILOL 6.25 MG: 6.25 TABLET, FILM COATED ORAL at 16:54

## 2018-08-10 RX ADMIN — AMLODIPINE BESYLATE 5 MG: 5 TABLET ORAL at 21:06

## 2018-08-10 RX ADMIN — AMLODIPINE BESYLATE 5 MG: 5 TABLET ORAL at 07:54

## 2018-08-10 RX ADMIN — BACITRACIN, NEOMYCIN, POLYMYXIN B 1 SMALL APPLICATION: 400; 3.5; 5 OINTMENT TOPICAL at 08:00

## 2018-08-10 RX ADMIN — ASPIRIN 81 MG: 81 TABLET, COATED ORAL at 08:00

## 2018-08-10 RX ADMIN — ATORVASTATIN CALCIUM 80 MG: 80 TABLET, FILM COATED ORAL at 16:54

## 2018-08-10 RX ADMIN — BACITRACIN, NEOMYCIN, POLYMYXIN B 1 SMALL APPLICATION: 400; 3.5; 5 OINTMENT TOPICAL at 17:09

## 2018-08-10 RX ADMIN — INSULIN LISPRO 1 UNITS: 100 INJECTION, SOLUTION INTRAVENOUS; SUBCUTANEOUS at 16:55

## 2018-08-10 RX ADMIN — INSULIN ASPART 35 UNITS: 100 INJECTION, SUSPENSION SUBCUTANEOUS at 07:54

## 2018-08-10 RX ADMIN — INSULIN ASPART 35 UNITS: 100 INJECTION, SUSPENSION SUBCUTANEOUS at 16:54

## 2018-08-10 RX ADMIN — FAMOTIDINE 20 MG: 20 TABLET ORAL at 08:00

## 2018-08-10 NOTE — PROGRESS NOTES
Internal Medicine Progress Note  Patient: Alicia Platt  Age/sex: 62 y o  male  Medical Record #: [id number]      ASSESSMENT/PLAN:  Alicai Platt is seen and examined and mangement for following issues:    # L CVA:  S/p tPA w/small ICH post infusion  Continue ASA/Statin as well as aggressive lifestyle modifications; aggressive therapy per PMR  Outpt Neurology follow-up       # DM II:  Previously uncontrolled d/t non compliance; will continue Novolog 70/30 35U 2x daily  Continue SSI and accuchecks  Adjust medications as needed  Need to keep much tighter control - was discussed with pt's wife and pt  BS: 141 113 196 139     # CKD III:  Previous baseline was 1 8-2 2, now more like 2 0-2 4  Currently off ACE inhibitor, can transition amlodipine to ACE once creatinine shows stability per renal      # HTN:  Cont Coreg 6 25 mg 2 times daily  Renal added amlodipine 5mg bid; will monitor trend over next 24 hours, if remains elevated may need to add another agent     # h/o spinal stimulator:  Stable     # Non compliance:  Cont to stress the importance of compliance    # DM Neuropathy:  Refused tablet formulation due to difficulty swallowing  Changed to liquid and pt is taking the medication  Subjective: Patient seen and examined  Pt reports he is doing well  He denies any current complaints      ROS:   GI: denies abdominal pain, change bowel habits or reflux symptoms  Neuro: No new neurologic changes  Respiratory: No Cough, SOB  Cardiovascular: No CP, palpitations     Scheduled Meds:    Current Facility-Administered Medications:  acetaminophen 650 mg Oral Q6H PRN Brittnee Cabrera MD   amLODIPine 5 mg Oral Q12H Fernando Wallace MD   aspirin 81 mg Oral Daily Brittnee Cabrera MD   atorvastatin 80 mg Oral Daily With Italo Nazario MD   carvedilol 6 25 mg Oral BID With Meals Fernando Wallace MD   famotidine 20 mg Oral Daily Brittnee Cabrera MD   gabapentin 600 mg Oral Daily Brittnee Cabrera MD   insulin aspart protamine-insulin aspart 35 Units Subcutaneous BID AC Gertrude Rodriguez MD   insulin lispro 1-5 Units Subcutaneous TID AC Gertrude Rodriguez MD   insulin lispro 1-5 Units Subcutaneous HS Gertrude Rodriguez MD   neomycin-bacitracin-polymyxin b 1 small application Topical BID Karen Taylor PA-C   polyethylene glycol 17 g Oral Daily PRN Gertrude Rodriguez MD       Labs:       Results from last 7 days  Lab Units 08/09/18  0525 08/06/18  0521   WBC Thousand/uL 7 58 8 33   HEMOGLOBIN g/dL 12 1 12 2   HEMATOCRIT % 38 1 38 2   PLATELETS Thousands/uL 160 156       Results from last 7 days  Lab Units 08/09/18  0525 08/07/18  0555   SODIUM mmol/L 139 141   POTASSIUM mmol/L 4 2 4 8   CHLORIDE mmol/L 109* 107   CO2 mmol/L 25 28   BUN mg/dL 29* 30*   CREATININE mg/dL 2 20* 2 29*   GLUCOSE RANDOM mg/dL 112 109   CALCIUM mg/dL 9 0 9 0                  Glucose (mg/dL)   Date Value   08/09/2018 112   08/07/2018 109   08/06/2018 106   08/05/2018 123   01/03/2016 189 (H)   01/02/2016 178 (H)   01/01/2016 113   12/31/2015 128     Glucose, i-STAT (mg/dl)   Date Value   07/29/2018 213 (H)       Labs reviewed    Physical Examination:  Vitals:   Vitals:    08/09/18 0500 08/09/18 1348 08/09/18 2019 08/10/18 0505   BP: 157/79 135/87 141/62 153/85   BP Location: Left arm Right arm Left arm Left arm   Pulse: 74 72 90 83   Resp: 18 20 20 19   Temp: 97 9 °F (36 6 °C) 98 °F (36 7 °C) 98 1 °F (36 7 °C) 97 9 °F (36 6 °C)   TempSrc: Oral Oral Oral Oral   SpO2: 95% 96% 94% 94%   Weight:       Height:           PERRLA EOMI no JVD  RESP: CTAB, no R/R/W  CV: +S1 S2, regular rate, no rubs  ABD: soft, NT, ND, normal BS   EXT:  No edema  Neuro:  Alert and oriented x3  Gravelly voice  Right facial weakness  4/5 strength in the right upper and lower extremity  [ X ] Total time spent: 30 Mins and greater than 50% of this time was spent counseling/coordinating care      ** Please Note: Dragon 360 Dictation voice to text software may have been used in the creation of this document   **

## 2018-08-10 NOTE — PROGRESS NOTES
OT     08/10/18 0830   Pain Assessment   Pain Assessment No/denies pain   Pain Score No Pain   Restrictions/Precautions   Precautions Bed/chair alarms;Cognitive; Fall Risk;Impulsive;Supervision on toilet/commode   QI: Picking Up Object   Assistance Needed Physical assistance   Assistance Provided by Roland 25%-49%   Comment Pt Roni in stance w/ RW and LHR to retrieve beanbags from the floor  Pt reaching out of JOSE DAVID w/ LHR to retrieve bean bags  Pt able to hold on to RW w/ L hand while using R hand for the [facility]  Pt require VC to "stand tall and steady self" prior to b/l release of hands to take beanbag out of reacher grasp and hand bean bag to OT  Pt noted w/ quick movements t/o task and required VC from OT to slow down in order to maintain balance  Pt completed activity seated on mat to trial use of reacher, then completed task in stance to retrieve 10 bean bags for two trials  Pt required rest break 2* fatigue  Pt demo G understanding of why bending in stance to  items off the floor without a reacher is not safe  Pt reports "I will get dizzy and could fall"  Picking Up Object CARE Score 3   QI: Sit to Stand   Assistance Needed Supervision   Assistance Provided by Roland No physical assistance   Comment RW   Sit to Stand CARE Score 4   QI: Chair/Bed-to-Chair Transfer   Assistance Needed Incidental touching   Assistance Provided by Roland Less than 25%   Comment CGA w/ RW   Chair/Bed-to-Chair Transfer CARE Score 3   Transfer Bed/Chair/Wheelchair   Positioning Concerns Cognition   Limitations Noted In Balance; Coordination; Sequencing;Problem Solving   Adaptive Equipment Roller Walker   Stand Pivot Contact Guard   Sit to Stand Supervision   Stand to Sit Supervision   Findings Pt requires frequent VC for safe hand placement for all fxnl transfers  Pt benefits from tactile cue to hand to indicate that hand placement is incorrect and appears to prefer that cue to verbal cueing   Pt requires inc time to problem solve correct hand placement following tactile cue  Pt noted w/ inc carryover w/ safe hand placement by end of session after repetitive transfer training  No LOB noted, though pt cont to be impulsive  Bed, Chair, Wheelchair Transfer (FIM) 4 - Patient requires steadying assist or light touching   Kitchen Mobility   Kitchen-Mobility Level Walker   Kitchen Activity Retrieve items;Transport items   Kitchen Mobility Comments Pt Roni w/ RW to complete kitchen mobility  Pt required to retrieve 3 items from overhead cabinets w/ focus on carryover of safety and fxnl cognition  Pt able to recall 1/3 items to retrieve (jello, coffee mug, plate)  Pt required prompt to recall mug and categorical cue to recall plate  OT provided edu and visual demonstration on how to navigate safely in a kitchen w/ RW  Pt noted w/ poor carryover following demonstration  Pt side stepping and lifting walker off the ground and reaching far out of JOSE DAVID to retrieve items from cabinets rather than correctly positioning self in front of cabinet  Pt had seated rest break and then retrieved 3 more items from overhead cabinets  Pt cont to recall 1/3 items, could recall 2nd item w/ prompt, and could not recall 3rd item w/ prompt or categorical cue (oatmeal, tea bag, paper cup)  Pt fatigued, however no c/o headache or dizziness t/o kitchen mobility  No LOB noted  Pt cont to be limited 2* impulsivity, impaired dynamic standing balance, righting reactions, and problem solving  Exercise Tools   Exercise Tools Yes   Theraputty Pt completed 5 mins prograde and 5 mins retrograde on scifit w/ focus on endurance and UE strength  Pt required VC to maintain R hand grasp on handle  Pt required rest break at 2 minutes during retrograde 2* fatigue and overall b/l UE weakeness  Pt able to complete full 5 minutes following rest break  Cognition   Overall Cognitive Status Impaired   Arousal/Participation Alert; Cooperative   Attention Attends with cues to redirect Orientation Level Oriented to person;Oriented to place   Memory Decreased recall of precautions;Decreased recall of recent events;Decreased short term memory   Following Commands Follows one step commands without difficulty   Additional Activities   Additional Activities Comments Pt participated in obstacle course to focus on righting reactions and fxnl turning R and L w/ RW  Pt completed activity w/ Roni overall and no LOB noted when navigating around obstacles  No c/o dizziness  Pt able to complete obstacle course twice w/ rest break in between 2* fatigue  Pt required VC t/o to slow down and to safely manage RW during fxnl ambulation  Activity Tolerance   Activity Tolerance Patient tolerated treatment well   Assessment   Treatment Assessment Pt participated in skilled OT Tx session w/ focus on LHR to retrieve object from the floor, dynamic standing balance, endurance, kitchen mobility, repetitive transfer training, and fxnl cognition/recall  See above note for activity detail  Pt c/o fatigue and "wanting to go back to bed" t/o session  Pt reports concern for wife at end of session and perseverates on need to "go back to room and contact wife"  NSG aware and plan to contact pt 's wife  Cont OT POC w/ focus on dynamic standing balance, endurance, activity tolerance, transfer training, and safety awareness to maximize pt indep w/ ADLs/IADLs and all fxnl transfers  Prognosis Good   Problem List Decreased strength;Decreased range of motion;Decreased endurance; Impaired balance;Decreased coordination;Decreased mobility; Decreased cognition; Impaired judgement;Decreased safety awareness; Obesity   Barriers to Discharge Inaccessible home environment;Decreased caregiver support   Plan   Treatment/Interventions ADL retraining;Functional transfer training;LE strengthening/ROM; Therapeutic exercise; Endurance training;Cognitive reorientation;Patient/family training;Equipment eval/education; Bed mobility; Compensatory technique education   Progress Progressing toward goals   OT Therapy Minutes   OT Time In 0830   OT Time Out 1000   OT Total Time (minutes) 90   OT Mode of treatment - Individual (minutes) 90   OT Mode of treatment - Concurrent (minutes) 0   OT Mode of treatment - Group (minutes) 0   OT Mode of treatment - Co-treat (minutes) 0   OT Mode of Teatment - Total time(minutes) 90 minutes   Therapy Time missed   Time missed?  No

## 2018-08-10 NOTE — TREATMENT PLAN
Came to evaluate patient  But patient in rehab  From renal standpoint, renal functions stable yest      - C3, C4 nl  - UPCR > 4 gm  - await rest of serologies  - BP stable  - patient will be evaluated next on Monday  Please call if further issues/questions shall arise over weekend  - thank you

## 2018-08-10 NOTE — PROGRESS NOTES
Physical Medicine and Rehabilitation Progress Note  Rick Cifuentes 62 y o  male MRN: [id number]  Unit/Bed#: [facility] 305-42 Encounter: [id number]    HPI: Rick Cifuentes is a 62 y o  male who presented to the [address] with Rt sided weakness, Rt facial droop, and slurred speech  Patient was given TPA and responded well and does not currently have paresis, slurred speech, or facial droop  Subjective: No acute events overnight  Patient without complaint today  No CP or SOB  ROS:  General ROS: negative for - chills or fever  Psychological ROS: negative for - anxiety or depression  Ophthalmic ROS: positive for - Left eye ptosis  Respiratory ROS: no cough, shortness of breath, or wheezing  Cardiovascular ROS: no chest pain or dyspnea on exertion  Gastrointestinal ROS: no abdominal pain, change in bowel habits, or black or bloody stools  Genito-Urinary ROS: no dysuria, trouble voiding, or hematuria  Musculoskeletal ROS: negative for - muscle pain  Neurological ROS: negative for - numbness/tingling     Assessment/Plan:      Diabetic macular edema (HCC)   Assessment & Plan    · Patient with history of diabetic macular edema, in addition to diabetic retinopathy, vitreous hemorrhage, nuclear sclerosis  · Receives monthly Lucentis injections at [facility]  Originally had appointment for repeat injections on 08/09; Office was called and discussion had with Dr Kathy Taylor, he stated injections are non-urgent and can wait until after discharge next week  Rescheduled appointment with Dr Wicho Cutler for 8/14/18 at 3pm  In addition, [facility] team member will call family to inform them of appointment change and provide re-assurance          CKD (chronic kidney disease)   Assessment & Plan      Results from last 7 days  Lab Units 08/09/18  0525 08/07/18  0555 08/06/18  0521   CREATININE mg/dL 2 20* 2 29* 2 32*     · Baseline Cr appears to be 2 0-2 25, renal service managing as inpt (follows with Dr Roscoe Rene as OP)        Elevated alkaline phosphatase level   Assessment & Plan    Chronically elevated going back 1 year, currently mildly elevated at 133 ()         Cognitive impairment   Assessment & Plan    Per family present x 1 year, OP [facility] for further testing/management         Neuropathy   Assessment & Plan    Likely 2/2 to DM, on home neurontin 600 qd        Essential hypertension   Assessment & Plan    · Home coreg increased from 3 125 to 6 25 mg BID, no changes today  · Home norvasc increased from 5 mg qd to q12, no changes today  · Renal managing            Hyperlipidemia   Assessment & Plan    · Home lovastatin 20 mg qpm changed to lipitor 80 mg qpm per neuro for CVA        Type 2 diabetes mellitus with hyperglycemia (HCC)   Assessment & Plan    · On home novolog 70/30 35 units BID, no changes today        * Ischemic cerebrovascular accident (CVA) ([facility] [address] )   Assessment & Plan    · On home ASA 81 mg qd  · Home lovastatin 20 mg qpm changed to lipitor 80 mg qpm per neuro           Scheduled Meds:    Current Facility-Administered Medications:  acetaminophen 650 mg Oral Q6H PRN Leah Ashraf MD   amLODIPine 5 mg Oral Q12H Joby Ruiz MD   aspirin 81 mg Oral Daily Leah Ashraf MD   atorvastatin 80 mg Oral Daily With Saba Hatch MD   carvedilol 6 25 mg Oral BID With Meals Joby Ruiz MD   famotidine 20 mg Oral Daily Leah Ashraf MD   gabapentin 600 mg Oral Daily Leah Ashraf MD   insulin aspart protamine-insulin aspart 35 Units Subcutaneous BID AC Leah Ashraf MD   insulin lispro 1-5 Units Subcutaneous TID Reji Jacques MD   insulin lispro 1-5 Units Subcutaneous HS Leah Ashraf MD   neomycin-bacitracin-polymyxin b 1 small application Topical BID Karen Taylor PA-C   polyethylene glycol 17 g Oral Daily PRN Leah Ashraf MD        DVT ppx: SCDs b/l, patient ambulating sufficient distances  Dispo: 8/13     Will defer to PCP with further testing/treatment and/or specialist referral at PCP's discretion      Incidental findings noted on CT A/P 8/2016:  1) Rt renal cysts  2) cirrhosis/portal hypertension  3) splenomegaly     Other incidental findings:  1) grade I DD  2) Rt carotid and B/L vertebral artery stenosis    Objective:    Functional Update:  Mobility: min-cg  Transfers: cg-sup  ADLs: min-cg    Allergies per EMR    Physical Exam:    General: alert, no apparent distress, cooperative and comfortable  HEENT:  Head: Normocephalic, no lesions, without obvious abnormality  CARDIAC:  +s1/2  LUNGS:  respirations unlabored   ABDOMEN:  soft NT   EXTREMITIES:  no significant LE edema   NEURO:   awake, alert, approriately answering questions   PSYCH:  mood/affect currently stable       Diagnostic Studies: reviewed, no new imaging  Ct Head Wo Contrast    Result Date: 8/5/2018  Impression: Expected evolution of the known ischemic infarct in the left basal ganglion since prior CT  No hemorrhagic conversion  No new ischemic infarcts are seen  No new intraparenchymal hemorrhages are seen  No hydrocephalus   Workstation performed: [id number]       Laboratory:      Results from last 7 days  Lab Units 08/09/18  0525 08/06/18  0521   HEMOGLOBIN g/dL 12 1 12 2   HEMATOCRIT % 38 1 38 2   WBC Thousand/uL 7 58 8 33       Results from last 7 days  Lab Units 08/09/18  0525 08/07/18  0555 08/06/18  0521   BUN mg/dL 29* 30* 31*   SODIUM mmol/L 139 141 142   POTASSIUM mmol/L 4 2 4 8 4 5   CHLORIDE mmol/L 109* 107 109*   GLUCOSE RANDOM mg/dL 112 109 106   CREATININE mg/dL 2 20* 2 29* 2 32*            Patient Active Problem List   Diagnosis    Type 2 diabetes mellitus with hyperglycemia (HCC)    Hyperlipidemia    Essential hypertension    [facility]schemic [facility]vascular accident (CVA) ([facility] [address] )    Neuropathy    Cognitive impairment    Elevated alkaline phosphatase level    CKD (chronic kidney disease)    Diabetic macular edema (HCC)     Total time spent: Minimum 25 minutes was spent face-to-face with the patient

## 2018-08-10 NOTE — SOCIAL WORK
Clinicals faxed to [facility] as a secondary payer source to verify if [address] is needed   Awaiting determination

## 2018-08-10 NOTE — PROGRESS NOTES
08/10/18 1000   Pain Assessment   Pain Assessment No/denies pain   Restrictions/Precautions   Precautions Bed/chair alarms;Cognitive; Fall Risk;Supervision on toilet/commode   Memory Skills   Memory (FIM) 2 - Recalls 1 of 2 steps   Social Interaction (FIM) 5 - Interacts appropriately with others 90% of time   Speech/Language/Cognition Assessmetn   Treatment Assessment Session began w/ review of visitors, which pt was able to recall all family members which visited last night  Recall of last meal was mod A to recall and recall of prior therapy was min A  Engaged in completing checkbook management task, which pt was unable to complete successfully, noting difficulty in placement of information as well as placing information into checkbook activity  Ability to complete the math associated to determine a balance was severely impaired  Introduced a calculator to improve ability to complete calculations, where pt remained errored in using  Educated pt at this time that wife and or children should take over finances at this time, but encouraging pt to continue to try to complete but check over work  Engaged in completing coin calculations, where pt was 20/30 accurate w/ task  Pt remains only oriented to self, where pt was able to elicit situation given phonemic cue, binary choice for date and required directive cue for place  Will continue to benefit from SLP services to maximize cognitive linguistic skills at this time  QI: Eating   Assistance Needed Set-up / clean-up   Eating CARE Score 5   SLP Therapy Minutes   SLP Time In 1000   SLP Time Out 1030   SLP Total Time (minutes) 30   SLP Mode of treatment - Individual (minutes) 30   SLP Mode of treatment - Concurrent (minutes) 0   SLP Mode of treatment - Group (minutes) 0   SLP Mode of treatment - Co-treat (minutes) 0   SLP Mode of Teatment - Total time(minutes) 30 minutes   Therapy Time missed   Time missed?  No   Daily FIM Score   Problem solving (FIM) 2 - Needs direction more than ½ time to initiate, plan or complete simple tasks   Comprehension (FIM) 3 - Understands basic info/conversation 50-74% of time   Expression (FIM) 4 - Expresses basic info/needs 75-90% of time   Eating (FIM) 5 - Patient needs help to open contianers or set up tray

## 2018-08-11 LAB
GLUCOSE SERPL-MCNC: 106 MG/DL (ref 65–140)
GLUCOSE SERPL-MCNC: 136 MG/DL (ref 65–140)
GLUCOSE SERPL-MCNC: 151 MG/DL (ref 65–140)
GLUCOSE SERPL-MCNC: 71 MG/DL (ref 65–140)
KAPPA LC FREE SER-MCNC: 74.9 MG/L (ref 3.3–19.4)
KAPPA LC FREE/LAMBDA FREE SER: 2.72 {RATIO} (ref 0.26–1.65)
LAMBDA LC FREE SERPL-MCNC: 27.5 MG/L (ref 5.7–26.3)

## 2018-08-11 PROCEDURE — 97530 THERAPEUTIC ACTIVITIES: CPT

## 2018-08-11 PROCEDURE — 97110 THERAPEUTIC EXERCISES: CPT

## 2018-08-11 PROCEDURE — G0515 COGNITIVE SKILLS DEVELOPMENT: HCPCS

## 2018-08-11 PROCEDURE — 97127 HB COGNITIVE SKILLS DEVELOPMENT, EACH 15 MUNUTES: CPT

## 2018-08-11 PROCEDURE — 97116 GAIT TRAINING THERAPY: CPT

## 2018-08-11 PROCEDURE — 82948 REAGENT STRIP/BLOOD GLUCOSE: CPT

## 2018-08-11 PROCEDURE — 97112 NEUROMUSCULAR REEDUCATION: CPT

## 2018-08-11 RX ADMIN — INSULIN LISPRO 1 UNITS: 100 INJECTION, SOLUTION INTRAVENOUS; SUBCUTANEOUS at 11:41

## 2018-08-11 RX ADMIN — INSULIN ASPART 35 UNITS: 100 INJECTION, SUSPENSION SUBCUTANEOUS at 08:23

## 2018-08-11 RX ADMIN — ATORVASTATIN CALCIUM 80 MG: 80 TABLET, FILM COATED ORAL at 16:42

## 2018-08-11 RX ADMIN — FAMOTIDINE 20 MG: 20 TABLET ORAL at 08:23

## 2018-08-11 RX ADMIN — CARVEDILOL 6.25 MG: 6.25 TABLET, FILM COATED ORAL at 16:42

## 2018-08-11 RX ADMIN — CARVEDILOL 6.25 MG: 6.25 TABLET, FILM COATED ORAL at 08:23

## 2018-08-11 RX ADMIN — ASPIRIN 81 MG: 81 TABLET, COATED ORAL at 08:23

## 2018-08-11 RX ADMIN — GABAPENTIN 600 MG: 250 SUSPENSION ORAL at 08:35

## 2018-08-11 RX ADMIN — AMLODIPINE BESYLATE 5 MG: 5 TABLET ORAL at 20:42

## 2018-08-11 RX ADMIN — INSULIN ASPART 35 UNITS: 100 INJECTION, SUSPENSION SUBCUTANEOUS at 16:42

## 2018-08-11 RX ADMIN — AMLODIPINE BESYLATE 5 MG: 5 TABLET ORAL at 08:23

## 2018-08-11 NOTE — PLAN OF CARE
Problem: METABOLIC, FLUID AND ELECTROLYTES - ADULT  Goal: Fluid balance maintained  INTERVENTIONS:  - Monitor labs and assess for signs and symptoms of volume excess or deficit  - Monitor I/O and WT  - Instruct patient on fluid and nutrition as appropriate   Outcome: Completed Date Met: 08/11/18

## 2018-08-11 NOTE — PLAN OF CARE
Problem: METABOLIC, FLUID AND ELECTROLYTES - ADULT  Goal: Electrolytes maintained within normal limits  INTERVENTIONS:  - Monitor labs and assess patient for signs and symptoms of electrolyte imbalances  - Administer electrolyte replacement as ordered  - Monitor response to electrolyte replacements, including repeat lab results as appropriate  - Instruct patient on fluid and nutrition as appropriate   Outcome: Completed Date Met: 08/11/18

## 2018-08-11 NOTE — PROGRESS NOTES
08/11/18 1230   Pain Assessment   Pain Assessment No/denies pain   Pain Score No Pain   Restrictions/Precautions   Precautions Bed/chair alarms; Fall Risk;Impulsive;Visual deficit   Weight Bearing Restrictions No   ROM Restrictions No   Cognition   Overall Cognitive Status Impaired   Arousal/Participation Alert   Attention Attends with cues to redirect   Orientation Level Oriented to place;Oriented to person;Oriented to situation;Disoriented to time   Memory Decreased short term memory;Decreased recall of recent events;Decreased recall of precautions   Following Commands Follows multistep commands with increased time or repetition   Subjective   Subjective Pt states he is very tired agreeable to therapy   QI: Roll Left and Right   Assistance Needed Set-up / clean-up   Assistance Provided by [place] No physical assistance   Roll Left and Right CARE Score 5   QI: Sit to [address] / clean-up   Assistance Provided by [place] No physical assistance   Sit to Lying CARE Score 5   QI: Lying to Sitting on Side of Bed   Reason if not Attempted Activity not applicable   Lying to Sitting on Side of Bed CARE Score 9   QI: Sit to [address] Provided by [place] No physical assistance   Sit to Stand CARE Score 4   Bed Mobility   Able to Roll Left to Right;Right to Left   Findings DS   QI: Chair/Bed-to-Chair Transfer   Assistance Needed Incidental touching;Verbal cues   Assistance Provided by [place] Less than 25%   Chair/Bed-to-Chair Transfer CARE Score 3   Transfer Bed/Chair/Wheelchair   Limitations Noted In Balance; Coordination;Problem Solving; Sequencing;LE Strength   Adaptive Equipment Roller Walker   Stand Pivot Contact Guard   Sit to Stand Supervision   Stand to Sit Supervision   Sit to Supine Supervision   Car Transfer Contact Guard   Findings Pt impulsive CGA for majority of transitions due to multiple LOB episodes   Bed, Chair, Wheelchair Transfer (FIM) 4 - Patient requires steadying assist or light touching   QI: Car Transfer   Assistance Needed Incidental touching   Assistance Provided by Helen Less than 25%   Car Transfer CARE Score 3   QI: Walk 10 Feet   Assistance Needed Incidental touching   Assistance Provided by Helen Less than 25%   Walk 10 Feet CARE Score 3   QI: Walk 50 Feet with Two Turns   Assistance Needed Incidental touching;Verbal cues   Assistance Provided by Helen Less than 25%   Walk 50 Feet with Two Turns CARE Score 3   QI: Walk 150 Feet   Assistance Needed Incidental touching;Verbal cues   Assistance Provided by Helen Less than 25%   Walk 150 Feet CARE Score 3   QI: Walking 10 Feet on Uneven Surfaces   Reason if not Attempted Safety concerns   Walking 10 Feet on Uneven Surfaces CARE Score 88   Ambulation   Does the patient walk? 2  Yes   Primary Discharge Mode of Locomotion Walk   Walk Assist Level Contact Guard   Gait Pattern Inconsistant Prachi;Decreased foot clearance;Narrow JOSE DAVID; Improper weight shift   Assist Device Roller Walker   Distance Walked (feet) 350 ft  (x 2; w/o AD x 20' x 4 min A )   Limitations Noted In Balance; Coordination; Heel Strike; Endurance;Speed;Strength;Swing   Findings Ambulation trials w RW community distances with cues for RW proximity, inc JOSE DAVID, postural alignment and emphasizing HS as well as pacing himself slower   Walking (FIM) 4 - Patient requires steadying assist or light touching AND distance 150 feet or more, no rest   QI: Wheel 50 Feet with Two Turns   Reason if not Attempted Activity not applicable   Wheel 50 Feet with Two Turns CARE Score 9   QI: Wheel 150 Feet   Reason if not Attempted Activity not applicable   Wheel 845 Feet CARE Score 9   Wheelchair mobility   QI: Does the patient use a wheelchair? 0   No   QI: 1 Step (Curb)   Assistance Needed Incidental touching;Verbal cues   Assistance Provided by Helen Less than 25%   1 Step (Curb) CARE Score 3   QI: 4 Steps   Assistance Needed Incidental touching   Assistance Provided by [place] Less than 25%   4 Steps CARE Score 3   QI: 12 Steps   Reason if not Attempted Safety concerns   12 Steps CARE Score 88   Stairs   Type Stairs;Ramp;Curb   # of Steps 8  ( steps 6" )   Weight Bearing Precautions Fall Risk   Assist Devices Roller Walker;Bilateral Rail   Findings CGA for elevation training; curb ramp CGA   Stairs (FIM) 2 - Patient goes up and down 4 - 11 stairs regardless of assist/device/setup   QI: Picking Up Object   Reason if not Attempted Activity not applicable   Picking Up Object CARE Score 9   QI: Toilet Transfer   Reason if not Attempted Activity not applicable   Toilet Transfer CARE Score 9   Therapeutic Interventions   Strengthening seated LAQs 4#, alt marches x 20 ea; standing B heel raises x 15   Neuromuscular Re-Education R step overs small red bolster HHA min A for refacilitation motor control and coordination, WBOS and inc hip/knee flexion x 20   Other standing NBOS w EC x 30 sec x 4 cues for counterbalance, trunk stabilization and hip/ankle strategy   Equipment Use   NuStep L1 x 10'   Assessment   Treatment Assessment Pt presents with decreased safety awareness and poor-mod carry over at times with multiple LOB R cues for pacing himself, WBOS and postural alignment  Pt impulsive at times and requires cues for his safety especially since he is to be DCd to home on Monday  Reviewed with pt and wife FAST and recommeded life alert if they feel is necessary  Pt performed balance and coordination actviites with pt R quad demo dec motor control  Trialed pt without AD short distances only with cues again for pacing himself  Pt will cont to benefit from PT services to meet functional goals  Family/Caregiver Present wife   Problem List Decreased endurance; Impaired balance;Decreased mobility; Decreased cognition; Impaired judgement;Decreased safety awareness;Decreased coordination   Barriers to Discharge Inaccessible home environment;Decreased caregiver support   PT Barriers   Physical Impairment Impaired balance;Decreased mobility; Decreased coordination;Decreased cognition; Impaired judgement;Decreased safety awareness; Impaired vision;Obesity; Decreased skin integrity   Functional Limitation Walking;Transfers   Plan   Treatment/Interventions Functional transfer training;LE strengthening/ROM; Elevations; Therapeutic exercise; Endurance training;Bed mobility;Gait training   Progress Progressing toward goals   Recommendation   Recommendation 24 hour supervision/assist;Outpatient PT; Home with family support   PT Therapy Minutes   PT Time In 1230   PT Time Out 1400   PT Total Time (minutes) 90   PT Mode of treatment - Individual (minutes) 90   PT Mode of treatment - Concurrent (minutes) 0   PT Mode of treatment - Group (minutes) 0   PT Mode of treatment - Co-treat (minutes) 0   PT Mode of Teatment - Total time(minutes) 90 minutes   Therapy Time missed   Time missed?  No

## 2018-08-11 NOTE — PROGRESS NOTES
08/11/18 1030   Pain Assessment   Pain Assessment No/denies pain   Restrictions/Precautions   Precautions Bed/chair alarms; Fall Risk;Cognitive; Impulsive;Supervision on toilet/commode   Lifestyle   Autonomy "can I go to [facility]?" pt informed of D/C date Monday   QI: Sit to Stand   Assistance Needed Supervision   Comment RW   Sit to Stand CARE Score 4   QI: Chair/Bed-to-Chair Transfer   Assistance Needed Physical assistance   Assistance Provided by [place] 25%-49%   Comment RW   Chair/Bed-to-Chair Transfer CARE Score 3   Transfer Bed/Chair/Wheelchair   Positioning Concerns Cognition   Limitations Noted In Balance;Problem Solving; Sequencing   Adaptive Equipment Roller Walker   Sit to TXU Keith   Stand to [employer]   Findings Pt presents with improved performance during functional transfers in regards to hand placement, requires verbal cuss 25% of the time, mostly when distracted about completion of next task  Pt completes functional mobility/transfers in straight line wit supervision using RW, requires min assist when navigating turns through obstacles on floor  Pt presents with improvement in navigating obstacles, noted with no significant LOB, but does require verbal cues to decrease speed  At end of session pt with poor attention to functional mobility 2* distracted by family visiting, noted with 1x LOB to (R) requiring min assist to correct   OTR/L educated pt's wife and daughter that pt's impaired attention and concentration, as well as distractability, limit performance in ADLS and functional task/mobility and therefore pt requires 24/7 supervision and currently contact guard assist (which pt's wife has demonstrated ability to provide) with use of RW     Bed, Chair, Wheelchair Transfer (FIM) 4 - Patient requires steadying assist or light touching   Health Management   Health Management Level of Assistance Close supervision;Minimal verbal cues   Health Management Pt engaged in IADL medication management with wife present  Pt's wife reports she purchased pt AM/PM pillbox for use at home  Pt engaged in simulated medication management task, requiring pt to read simulated medication instructions on bottle to fill pillbox  Pt able to accurately complete simple directions "take 1 time in the morning" with only reassurance  However, pt required min verbal cues for problem solving during more complicated directions, "take 1 pill every 12 hours"  OTR/L educated pt and pt's wife on recommendation for blood pressure monitor at home 2* BP medication parameters  Pt's wife and pt verbalize understanding, wife reports she will purchase independently  OT recommending supervision for all medication management at home 2* cognitive deficits, pt's wife verbalizes understanding and will assist/supervise pt at home  Cognition   Overall Cognitive Status Impaired   Arousal/Participation Alert   Attention Attends with cues to redirect   Orientation Level Oriented to place;Oriented to person;Oriented to situation;Disoriented to time   Memory Decreased short term memory;Decreased recall of recent events;Decreased recall of precautions   Following Commands Follows multistep commands with increased time or repetition   Comments Pt oriented to day of the week but not month or season  OTR/L administered Large Chris Cognitive Level Screen (ACLS) 2* baseline visual impairments  Pt scored 4 2/6 0 indicating 24 Hour supervision is recommended to remove dangerous objects outside the visual field and to solve problems due to minor changes in the environment  See below for further details on functional implication of score  Pt's wife provided with copy of functional implications list and verbalizes understanding of OT recommendation for 24/7 supervision and previously recommended chair alarm      Activity Tolerance   Activity Tolerance Patient tolerated treatment well   Assessment   Treatment Assessment Pt participated in skilled OT tx session focused on administration of ACLS, functional mobility/transfers, medication managemet, pt/caregiver training with wife  See above for further details on functional performance  Given pt's score of 4 2 on ACLS and pt's impaired problem solving/ short-term/ memory/ attention and distractability during ADLS, IADLS and functional mobility/transfesr, OT recommending 24/7 supervision upon D/C, which pt's wife verbalizes understanding of  Plan for pt to D/C home Monday with 24/7 supervision and continued outpatient OT services  Continue plan of care with focus on ADL performance, functional cognition, and item retrieval from floor  Prognosis Good   Problem List Decreased endurance; Impaired balance;Decreased mobility; Decreased cognition; Impaired judgement;Decreased safety awareness;Decreased coordination   Plan   Treatment/Interventions ADL retraining;Functional transfer training;Cognitive reorientation;Patient/family training;Equipment eval/education; Compensatory technique education   Progress Progressing toward goals   Recommendation   OT Discharge Recommendation 24 hour supervision/assist  (and outpatient OT)   Equipment Recommended (BP monitor )   OT Equipment ordered wife will purchase independently    OT Therapy Minutes   OT Time In 1030   OT Time Out 1130   OT Total Time (minutes) 60   OT Mode of treatment - Individual (minutes) 60   OT Mode of treatment - Concurrent (minutes) 0   OT Mode of treatment - Group (minutes) 0   OT Mode of treatment - Co-treat (minutes) 0   OT Mode of Teatment - Total time(minutes) 60 minutes   Therapy Time missed   Time missed? No       4 2    Administered Large Chris Cognitive Level Screen (ACLS)  Pt scored 4 2/6 0 indicating 24 Hour supervision is recommended to remove dangerous objects outside the visual field and to solve problems due to minor changes in the environment  Behavior:  Recognizes errors  Identifies problems  Asks for Day/Date    Sequences one activity at a time  Processing speed is slow and may take extra time to complete tasks  Memorization will be very slow  Requires long term repetitive training for new tasks  Keep directions short and concise  Grooming:  Initiates and completes with supplies from familiar accessible locations  Stops and asks for help when an error is made  Expect cuts with use of straight razor  Check every few minutes if left alone  Allow ample time for completion of tasks  Dressing:  Selects clothing items based on striking features like color  May choose to wear a favorite item all the time  May argue with suggestions to change selections  Bathing:  Recognizes need for a bath and may ask if time is appropriate  Collects supplies from a visible location  Follows routine  May miss small hidden areas  Recognizes problem like lack of soap and asks for help  Remove hazards from proximity to bath (ie: electrical appliances)  Walking/exercising:  Ambulates to a familiar location ½ to 1 mile away  May not vary route  May fail to attend to activity or noises outside visual field  May ask for help if lost   May be able to learn a graded exercise program   May become anxious in high stimulus environments (malls, airports, casinos)  May resist going to certain places  Eating:  Initiates coming to table at routine times  Uses utensils  Recognizes errors and asks for help  Attempts to comply with social standards  May have trouble waiting for others  Assist with handling hot foods/liquids  Monitor compliance with special diet  Toileting:  Recognizes difficulties that interfere with toileting routine and asks for help  May be able to report change in bowel or bladder habits  May take much longer than average to complete toileting  Medications:  Initiates taking familiar dose at regular time of day  May not be able to open container and may have incorrect ideas of effects that lead to noncompliance    May not seek assist for problems encountered  Supervise to ensure compliance  Use of adaptive equipment:  Needs help with more complex equipment  May need assistance with fasteners that require fine motor skills  Does not understand the potential hazards of incorrect use  May forget to use equipment  Housekeeping:   Initiates and completes a routine of housekeeping activities  May be able to set priorities but may become fixated on a priority  Cleans, polishes and sweeps one feature at a time  Check for quality of cleaning results  Food preparation:  Does not plan for long term food needs  May go to store repeatedly whenever food is desired  May search cabinet for particular food item and ask for help when items are not found  May prepare a simple meal but may not recognize problems  Store all undesirable equipment away from view  Do not leave alone when using dangerous tools/equipment  Spending money:  May be able to set a priority for an expense that is highly valued  May become fixated on item  May resist help  Shopping:  May go to familiar shops but does not make a list or compare prices  Looks in familiar locations for an item  May ask for help when not found  May insist on purchasing item that is unrealistic or impractical   May resist help  May be anxious in high stimulus environments  Laundry:  May sort laundry by color or other simple striking cues  May view as a priority and initiate regularly  Recognizes errors like too much soap but cannot offer solutions  Traveling:  May go through actions slowly  Can sit unaccompanied up to 1 hour on a bus or train with landmarks as a guide when to get off  Asks for assistance if lost, does not alter familiar routes  May become anxious in high stimulus environments (bus, airport terminals)  Telephone:  Dials a new number written down by checking it 1 digit at a time  Writes down information very slowly    Does not consider a persons schedule or time of day when calling  Needs assistance to make calls from unfamiliar telephones  May call emergency or familiar numbers excessively  Driving:  Should NOT operate a motor vehicle         Blanche Lanes, MS, OTR/L , CBIS

## 2018-08-11 NOTE — PROGRESS NOTES
Internal Medicine Progress Note  Patient: Ravinder Simons  Age/sex: 62 y o  male  Medical Record #: [id number]      ASSESSMENT/PLAN:  Ravinder Simons is seen and examined and mangement for following issues:    # L CVA:  S/p tPA w/small ICH post infusion  Continue ASA/Statin as well as aggressive lifestyle modifications; aggressive therapy per PMR  Outpt Neurology follow-up       # DM II:  Previously uncontrolled d/t non compliance; will continue Novolog 70/30 35U 2x daily  Continue SSI and accuchecks  Adjust medications as needed  Need to keep much tighter control - was discussed with pt's wife and pt  BS:  136, 151, 106     # CKD III:  Previous baseline was 1 8-2 2, now more like 2 0-2 4  Currently off ACE inhibitor, can transition amlodipine to ACE once creatinine shows stability per renal  shows stability per renal   Creatinine 2 2 today     # HTN:  Cont Coreg 6 25 mg 2 times daily  Renal added amlodipine 5mg bid; will monitor trend over next 24 hours, if remains elevated may need to add another agent     # h/o spinal stimulator:  Stable     # Non compliance:  Cont to stress the importance of compliance    # DM Neuropathy:  Refused tablet formulation due to difficulty swallowing  Changed to liquid and pt is taking the medication  Subjective: Patient seen and examined  Pt reports he is doing well  He denies any current complaints      ROS:   GI: denies abdominal pain, change bowel habits or reflux symptoms  Neuro: No new neurologic changes  Respiratory: No Cough, SOB  Cardiovascular: No CP, palpitations     Scheduled Meds:    Current Facility-Administered Medications:  acetaminophen 650 mg Oral Q6H PRN Jordan Lan MD   amLODIPine 5 mg Oral Q12H Jeffrey Isidro MD   aspirin 81 mg Oral Daily Jordan Lan MD   atorvastatin 80 mg Oral Daily With Aguilar Casiano MD   carvedilol 6 25 mg Oral BID With Meals Jeffrey Isidro MD   famotidine 20 mg Oral Daily Jordan Lan MD   gabapentin 600 mg Oral Daily Maria Elena Iniguez MD   insulin aspart protamine-insulin aspart 35 Units Subcutaneous BID AC Maria Elena Iniguez MD   insulin lispro 1-5 Units Subcutaneous TID AC Maria Elena Iniguez MD   insulin lispro 1-5 Units Subcutaneous HS Maria Elena Iniguez MD   neomycin-bacitracin-polymyxin b 1 small application Topical BID Karen Taylor PA-C   polyethylene glycol 17 g Oral Daily PRN Maria Elena Iniguez MD       Labs:       Results from last 7 days  Lab Units 08/09/18  0525 08/06/18  0521   WBC Thousand/uL 7 58 8 33   HEMOGLOBIN g/dL 12 1 12 2   HEMATOCRIT % 38 1 38 2   PLATELETS Thousands/uL 160 156       Results from last 7 days  Lab Units 08/09/18  0525 08/07/18  0555   SODIUM mmol/L 139 141   POTASSIUM mmol/L 4 2 4 8   CHLORIDE mmol/L 109* 107   CO2 mmol/L 25 28   BUN mg/dL 29* 30*   CREATININE mg/dL 2 20* 2 29*   GLUCOSE RANDOM mg/dL 112 109   CALCIUM mg/dL 9 0 9 0                  Glucose (mg/dL)   Date Value   08/09/2018 112   08/07/2018 109   08/06/2018 106   08/05/2018 123   01/03/2016 189 (H)   01/02/2016 178 (H)   01/01/2016 113   12/31/2015 128     Glucose, i-STAT (mg/dl)   Date Value   07/29/2018 213 (H)       Labs reviewed    Physical Examination:  Vitals:   Vitals:    08/10/18 1651 08/10/18 2041 08/11/18 0546 08/11/18 1349   BP: 158/82 139/75 133/65 141/79   BP Location: Left arm Left arm Left arm Right arm   Pulse: 70 70 68 67   Resp:  20 20 20   Temp:  98 °F (36 7 °C) 97 7 °F (36 5 °C) 97 6 °F (36 4 °C)   TempSrc:  Oral Oral Oral   SpO2:  93% 97% 96%   Weight:       Height:           PERRLA EOMI no JVD  RESP: CTAB, no R/R/W  CV: +S1 S2, regular rate, no rubs  ABD: soft, NT, ND, normal BS   EXT:  No edema  Neuro:  Alert and oriented x3  Gravelly voice  Right facial weakness  4/5 strength in the right upper and lower extremity  [ X ] Total time spent: 30 Mins and greater than 50% of this time was spent counseling/coordinating care      ** Please Note: Dragon 360 Dictation voice to text software may have been used in the creation of this document   **

## 2018-08-11 NOTE — PROGRESS NOTES
Speech Note     08/11/18 1031   Pain Assessment   Pain Assessment No/denies pain   Memory Skills   Memory (FIM) 3 - Recognizes, recalls/performs 50-74%   Social Interaction (FIM) 5 - Interacts appropriately with others 90% of time   Speech/Language/Cognition Assessmetn   Treatment Assessment Pt  began session with money management activity  Pt  accurately determined the appropriate amount of change for a transcation in 13/15 opportunities increasing to 15/15 when given cues to recheck work  Pt  memory was addressed with a picture memory task  Pt  recalled four pictures in 2/2 opportunities  When provided with a picture scene, pt  retained information from that scene while answering questions with 3/10 accuracy increasing to 7/10 given verbal cues  When auditorally presented with a list of 4 words, pt  answered questions based on the 4 words with 8/16 increasing to 12/16 given phonemic cues and repetition of words  Pt  presented with a category acitivity  Pt  accurately selected appropriate category members for a given category with 51/61 accuracy increasing to 58/61 when cued to recheck work  Pt  continues to make progress towards goals however continues to require skilled ST services to further improve cognitive linguistic skills at this time  SLP Therapy Minutes   SLP Time In 0930   SLP Time Out 1030   SLP Total Time (minutes) 60   SLP Mode of treatment - Individual (minutes) 60   SLP Mode of treatment - Concurrent (minutes) 0   SLP Mode of treatment - Group (minutes) 0   SLP Mode of treatment - Co-treat (minutes) 0   SLP Mode of Teatment - Total time(minutes) 60 minutes   Therapy Time missed   Time missed?  No   Daily FIM Score   Problem solving (FIM) 3 - Solves basic problmes 50-74% of time   Comprehension (FIM) 4 - Understands basic info/conversation 75-90% of time   Expression (FIM) 4 - Expresses basic info/needs 75-90% of time

## 2018-08-12 LAB
GLUCOSE SERPL-MCNC: 134 MG/DL (ref 65–140)
GLUCOSE SERPL-MCNC: 256 MG/DL (ref 65–140)
GLUCOSE SERPL-MCNC: 71 MG/DL (ref 65–140)
GLUCOSE SERPL-MCNC: 87 MG/DL (ref 65–140)

## 2018-08-12 PROCEDURE — 99232 SBSQ HOSP IP/OBS MODERATE 35: CPT | Performed by: PHYSICAL MEDICINE & REHABILITATION

## 2018-08-12 PROCEDURE — 97110 THERAPEUTIC EXERCISES: CPT | Performed by: OCCUPATIONAL THERAPY ASSISTANT

## 2018-08-12 PROCEDURE — 97535 SELF CARE MNGMENT TRAINING: CPT | Performed by: OCCUPATIONAL THERAPY ASSISTANT

## 2018-08-12 PROCEDURE — 82948 REAGENT STRIP/BLOOD GLUCOSE: CPT

## 2018-08-12 PROCEDURE — 97112 NEUROMUSCULAR REEDUCATION: CPT

## 2018-08-12 PROCEDURE — 97116 GAIT TRAINING THERAPY: CPT

## 2018-08-12 PROCEDURE — 97530 THERAPEUTIC ACTIVITIES: CPT

## 2018-08-12 PROCEDURE — 97110 THERAPEUTIC EXERCISES: CPT

## 2018-08-12 PROCEDURE — 97530 THERAPEUTIC ACTIVITIES: CPT | Performed by: OCCUPATIONAL THERAPY ASSISTANT

## 2018-08-12 RX ORDER — AMLODIPINE BESYLATE 5 MG/1
5 TABLET ORAL DAILY
Status: ON HOLD | COMMUNITY
End: 2018-08-13

## 2018-08-12 RX ORDER — CARVEDILOL 6.25 MG/1
6.25 TABLET ORAL 2 TIMES DAILY WITH MEALS
Status: ON HOLD | COMMUNITY
End: 2018-08-13

## 2018-08-12 RX ORDER — GABAPENTIN 600 MG/1
600 TABLET ORAL DAILY
COMMUNITY
End: 2018-08-24

## 2018-08-12 RX ORDER — ASPIRIN 81 MG/1
81 TABLET ORAL DAILY
COMMUNITY

## 2018-08-12 RX ORDER — INSULIN ASPART 100 [IU]/ML
35 INJECTION, SUSPENSION SUBCUTANEOUS
COMMUNITY
End: 2018-08-15 | Stop reason: SDUPTHER

## 2018-08-12 RX ADMIN — CARVEDILOL 6.25 MG: 6.25 TABLET, FILM COATED ORAL at 16:35

## 2018-08-12 RX ADMIN — INSULIN LISPRO 2 UNITS: 100 INJECTION, SOLUTION INTRAVENOUS; SUBCUTANEOUS at 11:17

## 2018-08-12 RX ADMIN — ATORVASTATIN CALCIUM 80 MG: 80 TABLET, FILM COATED ORAL at 16:35

## 2018-08-12 RX ADMIN — CARVEDILOL 6.25 MG: 6.25 TABLET, FILM COATED ORAL at 09:15

## 2018-08-12 RX ADMIN — FAMOTIDINE 20 MG: 20 TABLET ORAL at 09:15

## 2018-08-12 RX ADMIN — AMLODIPINE BESYLATE 5 MG: 5 TABLET ORAL at 21:02

## 2018-08-12 RX ADMIN — POLYETHYLENE GLYCOL 3350 17 G: 17 POWDER, FOR SOLUTION ORAL at 09:58

## 2018-08-12 RX ADMIN — GABAPENTIN 600 MG: 250 SUSPENSION ORAL at 09:19

## 2018-08-12 RX ADMIN — ASPIRIN 81 MG: 81 TABLET, COATED ORAL at 09:15

## 2018-08-12 RX ADMIN — INSULIN ASPART 35 UNITS: 100 INJECTION, SUSPENSION SUBCUTANEOUS at 16:35

## 2018-08-12 RX ADMIN — AMLODIPINE BESYLATE 5 MG: 5 TABLET ORAL at 09:15

## 2018-08-12 RX ADMIN — INSULIN ASPART 35 UNITS: 100 INJECTION, SUSPENSION SUBCUTANEOUS at 09:15

## 2018-08-12 NOTE — PROGRESS NOTES
Physical Medicine and Rehabilitation Progress Note  Gregorio Woodall 62 y o  male MRN: [id number]  Unit/Bed#: -35 Encounter: [id number]    HPI: Gregorio Woodall is a 62 y o  male who presented to the [address] with Rt sided weakness, Rt facial droop, and slurred speech  Patient was given TPA and responded well and does not currently have paresis, slurred speech, or facial droop  Subjective: Patient states he feels well and is looking forward to dc today         ROS:  Negative except as oultined above     Assessment/Plan:      GERD (gastroesophageal reflux disease)   Assessment & Plan    · On home pepcid 20 mg qd         Diabetic macular edema (HCC)   Assessment & Plan    · Patient with history of diabetic macular edema, in addition to diabetic retinopathy, vitreous hemorrhage, nuclear sclerosis  · Receives monthly Lucentis injections at [facility]  Originally had appointment for repeat injections on 08/09; Office was called and discussion had with Dr Sai Myers, he stated injections are non-urgent and can wait until after discharge next week  Rescheduled appointment with Dr Diogenes Lara for 8/14/18 at 3pm  In addition, [facility] team member will call family to inform them of appointment change and provide re-assurance          CKD (chronic kidney disease)   Assessment & Plan      · New baseline Cr appears to be 2 2-2 4, Cr currently stable at 2 2, renal service managing as inpt, OP FU with Dr Sunny Vega as PTA   · Renal ordered the following tests as inpt:  · SPEP/UPEP: pending, d/w Dr Oseas Levy of renal and this should not hold up dc as pt can FU with Dr Sunny Vega who will check results and he will manage as OP   · SOLEDAD/ANCA:  pending, d/w Dr Oseas Levy of renal and this should not hold up dc as pt can FU with Dr Sunny Vega who will check results and he will manage as OP    · UPCR: elevated, renal aware  · C3/C4 compliment: wnl, renal aware  · Immunoglobulin Lt chains: Ig Kappa, Ig Yadi, Kappa/Yadi ratio all elevated, d/w Dr Mari Zazueta of renal and felt that this may be simply from decreased clearance due to patient's CKD however the SPEP/UPEP which is pending will be more confirmatory  · Per Dr Mari Zazueta cleared for dc from renal standpoint  · D/W Dr Mari Zazueta regarding low K+ diet and as patient is not yet on ACE per Dr Mari Zazueta patient may be dc'd without K+ restriction  · Scrip provided upon dc for BMP (with eGFR/GFR) with results to Dr Cuong Landers (per Dr Mari Zazueta ordering phos and PTH levels can be deferred to Dr Cuong Landers)         Elevated alkaline phosphatase level   Assessment & Plan    Chronically elevated and stable going back 1 year, currently mildly elevated at 133 () on 7/28/18, OP FU with PCP with further testing/treatment and/or specialist referral at PCP's discretion            Cognitive impairment   Assessment & Plan    Per family present x 1 year, OP FU [facility] for further testing/management (appt requested)         Neuropathy   Assessment & Plan    Likely 2/2 to DM, on home neurontin 600 qd        Essential hypertension   Assessment & Plan    · Home coreg increased from 3 125 to 6 25 mg BID  · Home norvasc increased from 5 mg qd to q12  · Renal managing as inpt, pt to FU with Dr Cuong Landers as OP           Hyperlipidemia   Assessment & Plan    · Home lovastatin 20 mg qpm changed to lipitor 80 mg qpm per neuro for CVA        Type 2 diabetes mellitus with hyperglycemia (HCC)   Assessment & Plan    · on home novolog 70/30 35 units BID        * Ischemic cerebrovascular accident (CVA) ([facility] [address] )   Assessment & Plan    · On home ASA 81 mg qd  · Home lovastatin 20 mg qpm changed to lipitor 80 mg qpm per neuro  · OP FU with [facility], appt requested           Scheduled Meds:    Current Facility-Administered Medications:  acetaminophen 650 mg Oral Q6H PRN Anamika Braswell MD   amLODIPine 5 mg Oral Q12H Gracie Ervin MD   aspirin 81 mg Oral Daily Apolinar Mason MD   atorvastatin 80 mg Oral Daily With Sera Valles MD   carvedilol 6 25 mg Oral BID With Meals Reid Lopez MD   famotidine 20 mg Oral Daily Apolinar Mason MD   gabapentin 600 mg Oral Daily Apolinar Mason MD   insulin aspart protamine-insulin aspart 35 Units Subcutaneous BID AC Apolinar Mason MD   insulin lispro 1-5 Units Subcutaneous TID AC Apolinar Mason MD   insulin lispro 1-5 Units Subcutaneous HS Apolinar Mason MD   neomycin-bacitracin-polymyxin b 1 small application Topical BID Karen Taylor PA-C   polyethylene glycol 17 g Oral Daily PRN Apolinar Mason MD        DVT ppx: SCDs b/l, patient ambulating sufficient distances  Dispo: 8/13     Will defer to PCP with further testing/treatment and/or specialist referral at PCP's discretion      Incidental findings noted on CT A/P 8/2016:  1) Rt renal cysts: OP FU with Dr Rachel Hassan  2) cirrhosis/portal hypertension: OP FU with PCP with further testing/treatment and/or specialist referral at PCP's discretion   3) splenomegaly: OP FU with PCP with further testing/treatment and/or specialist referral at PCP's discretion        Other incidental findings:  1) grade I DD: OP FU with PCP with further testing/treatment and/or specialist referral at PCP's discretion   2) Rt carotid and B/L vertebral artery stenosis, OP FU at the vascular center or [facility]  3) sinus mucosal dz: asymptomatic, OP FU with PCP with further testing/treatment and/or specialist referral at PCP's discretion        Objective:    Functional Update:  Mobility: mod I  Transfers: mod I   ADLs: mod I     Allergies per EMR    Physical Exam:    Vitals:    08/13/18 0542   BP: 141/65   Pulse: 72   Resp: 20   Temp: 97 6 °F (36 4 °C)   SpO2: 96%         General: alert, no apparent distress, cooperative and comfortable  HEENT:  Head: Normocephalic, no lesions, without obvious abnormality    CARDIAC:  +s1/2  LUNGS:  respirations unlabored   ABDOMEN:  soft NT EXTREMITIES:  no significant LE edema   NEURO:   awake, alert, approriately answering questions   PSYCH:  mood/affect currently stable          Diagnostic Studies: reviewed, no new imaging  Ct Head Wo Contrast    Result Date: 8/5/2018  Impression: Expected evolution of the known ischemic infarct in the left basal ganglion since prior CT  No hemorrhagic conversion  No new ischemic infarcts are seen  No new intraparenchymal hemorrhages are seen  No hydrocephalus   Workstation performed: [id number]       Laboratory:      Results from last 7 days  Lab Units 08/09/18  0525   HEMOGLOBIN g/dL 12 1   HEMATOCRIT % 38 1   WBC Thousand/uL 7 58       Results from last 7 days  Lab Units 08/13/18  0553 08/09/18  0525 08/07/18  0555   BUN mg/dL 30* 29* 30*   SODIUM mmol/L 139 139 141   POTASSIUM mmol/L 4 7 4 2 4 8   CHLORIDE mmol/L 107 109* 107   GLUCOSE RANDOM mg/dL 88 112 109   CREATININE mg/dL 2 20* 2 20* 2 29*            Patient Active Problem List   Diagnosis    Type 2 diabetes mellitus with hyperglycemia (HCC)    Hyperlipidemia    Essential hypertension    Ischem[facility] ([facility]A) ([facility] [address] )    Neuropathy    Cognitive impairment    Elevated alkaline phosphatase level    CKD (chronic kidney disease)    Diabetic macular edema (HCC)    GERD (gastroesophageal reflux disease)

## 2018-08-12 NOTE — PROGRESS NOTES
Occupational Therapy Treatment Note:     08/12/18 0700   Pain Assessment   Pain Assessment No/denies pain   Pain Score No Pain   Restrictions/Precautions   Precautions Bed/chair alarms;Cognitive; Fall Risk;Impulsive;Supervision on toilet/commode;Visual deficit   Weight Bearing Restrictions No   ROM Restrictions No   QI: Eating   Assistance Needed Set-up / clean-up   Assistance Provided by [place] No physical assistance   Eating CARE Score 5   Eating Assessment   Food To Mouth Yes   Able To Cut Yes   Positioning Upright;Out of Bed   Meal Assessed Breakfast   Intake Mode PO;Self   Finishes Timely Yes   Opens Packages No   Eating (FIM) 5 - Patient needs help to open contianers or set up tray   QI: Oral Hygiene   Assistance Needed Set-up / clean-up;Supervision;Verbal cues   Assistance Provided by [place] No physical assistance   Comment VCs for initiation  While seated  Oral Hygiene CARE Score 4   Grooming   Able To Shave;Comb/Brush Hair;Wash/Dry Face;Brush/Clean Teeth;Wash/Dry Hands   Limitation Noted In Coordination;Problem Solving; Safety   Findings VCs for initiation of all tasks  Pt completed hand washing while in stance with CS and no noted LOB  Assist warranted for facial shaving as pt c/o fearfulness of cutting self; pt reported his wife assisted with this task at baseline  Grooming (FIM) 4 - Patient completed  4/5 tasks   QI: Shower/Bathe Self   Assistance Needed Adaptive equipment;Set-up / clean-up;Supervision;Verbal cues   Assistance Provided by [place] No physical assistance   Comment VCs to reach all areas and for thoroughness  Pt completed mona-care while in stance unilaterally supported at grab bar with S and VCs to maintain grasp at all times for optimal balance and safety      Shower/Bathe Self CARE Score 4   Bathing   Assessed Bath Style Shower   Anticipated D/C Bath Style Shower   Able to [place] No   Able to [employer] Temperature No   Able to Wash/Rinse/Dry (body part) Left Arm;Right Arm;L Upper Leg;R Upper Leg;L Lower Leg/Foot;R Lower Leg/Foot;Chest;Abdomen;Perineal Area; Buttocks   Limitations Noted in Balance; Coordination; Endurance;Problem Solving; Safety   Positioning Seated;Standing   Adaptive Equipment Shower Bars; Shower Seat;Hand Beck's   Bathing (FIM) 5 - Patient requires supervision/monitoring but completes 10/10 parts   Tub/Shower Transfer   Limitations Noted In Balance; Coordination; Endurance;Problem Solving; Safety;LE Strength   Adaptive Equipment Grab Bars;Seat with Back; Other  (RW)   Assessed Shower   Findings CS and VCs for technique in order to ensure good safety  Pt's family to purchase shower chair with back and reported to have 1 grab installed at baseline  Shower Transfer (FIM) 5 - Patient requires supervision/monitoring   QI: Upper Body Dressing   Assistance Needed Set-up / clean-up;Supervision;Verbal cues   Assistance Provided by [place] No physical assistance   Comment Seated  VCs for initiation and encouragement to pull down completely in the back as pt seeking assistance  Upper Body Dressing CARE Score 4   QI: Lower Body Dressing   Assistance Needed Adaptive equipment;Set-up / clean-up;Supervision;Verbal cues   Assistance Provided by [place] No physical assistance   Comment CS while pt standing briefly unsupported for clothing mgmt over hips with no noted LOB  VCs to have RW available for support prior to standing  Lower Body Dressing CARE Score 4   QI: Putting On/Taking Off Footwear   Assistance Needed Set-up / clean-up;Supervision;Verbal cues   Assistance Provided by [place] No physical assistance   Comment When provided with encouraging VCs and s/u pt able to doff/don socks and shoes while seated using method of crossing 1 LE over opposing knee with S     Putting On/Taking Off Footwear CARE Score 4   Dressing/Undressing Clothing   Remove UB Clothes Pullover Shirt   Remove LB Clothes Pants; Undergarment;Socks   [address] Pants;Undergarment;Socks; Shoes   Limitations Noted In Balance; Coordination; Endurance;Problem Solving; Safety;Timeliness   Adaptive Equipment Other  (RW)   Positioning Supported Sit;Standing   UB Dressing (FIM) 5 - Patient requires supervision/monitoring   LB Dressing (FIM) 5 - Patient requires supervision/monitoring   QI: Sit to [address] Provided by [place] No physical assistance   Sit to Lying CARE Score 6   QI: Lying to Sitting on Side of Bed   Assistance Needed Independent   Assistance Provided by [place] No physical assistance   Comment HOB flat without use of bed rails  Lying to Sitting on Side of Bed CARE Score 6   QI: Sit to Stand   Assistance Needed Adaptive equipment;Set-up / clean-up;Supervision;Verbal cues   Assistance Provided by [place] No physical assistance   Comment VCs for hand placement and safe RW use  Sit to Stand CARE Score 4   QI: Chair/Bed-to-Chair Transfer   Assistance Needed Adaptive equipment;Set-up / clean-up;Supervision;Verbal cues   Assistance Provided by [place] No physical assistance   Comment VCs for safe RW use  Chair/Bed-to-Chair Transfer CARE Score 4   Transfer Bed/Chair/Wheelchair   Positioning Concerns Cognition   Limitations Noted In Balance; Coordination; Endurance;Problem Solving;LE Strength   Adaptive Equipment Roller Walker   Stand Pivot Supervision   Sit to Stand Supervision   Stand to Sit Supervision   Supine to Sit Supervision   Findings Pt completed functional mobility short disatnces using RW with S and VCs for safe technique      Bed, Chair, Wheelchair Transfer (FIM) 5 - Patient requires supervision/monitoring   QI: [address] Needed Adaptive equipment;Set-up / clean-up;Supervision;Verbal cues   Assistance Provided by [place] No physical assistance   Comment Pt completed rear mona-care while in stance unilaterally supported at  with S and VCs for thoroughness, and clothing mgmt over hips while standing briefly unsupported with CS and no noted LOB  Toileting Hygiene CARE Score 4   Toileting   Able to [address] down yes, up yes  Able to Manage Clothing Closures Other  (None needed; elastic waisted pants  )   Manage Hygiene Bladder; Bowel   Limitations Noted In Balance; Coordination;Problem Solving; Safety;LE Strength   Adaptive Equipment Other  (RW)   Toileting (FIM) 5 - Patient requires supervision/monitoring   QI: Toilet Transfer   Assistance Needed Adaptive equipment;Set-up / clean-up;Supervision;Verbal cues   Assistance Provided by [place] No physical assistance   Comment VCs for technique in order to ensure good safety; using RW  Toilet Transfer CARE Score 4   Toilet Transfer   Surface Assessed Standard Toilet   Transfer Technique Standard   Limitations Noted In Balance; Endurance;Problem Solving; Safety;LE Strength   Adaptive Equipment (RW)   Toilet Transfer (FIM) 5 - Patient requires supervision/monitoring   Cognition   Overall Cognitive Status Impaired   Arousal/Participation Alert; Cooperative   Attention Attends with cues to redirect   Orientation Level Oriented to person;Oriented to place;Oriented to situation   Memory Decreased short term memory;Decreased recall of recent events;Decreased recall of precautions   Following Commands Follows one step commands with increased time or repetition   Activity Tolerance   Activity Tolerance Patient tolerated treatment well   Assessment   Treatment Assessment OT tx sessions focused on bed mobility, transfers, standng balance, functional mobility, toileting, ADL skills, functional cognition, safety awareness, and overall activity tolerance/endurance  Refer above for details on pt performance  Problem List Decreased strength;Decreased endurance; Impaired balance;Decreased mobility; Decreased coordination;Decreased cognition; Impaired judgement;Decreased safety awareness; Impaired vision   Barriers to Discharge Inaccessible home environment;Decreased caregiver support   Plan   Treatment/Interventions ADL retraining;Functional transfer training; Endurance training;Cognitive reorientation;Patient/family training;Equipment eval/education; Bed mobility; Compensatory technique education;OT   Progress Progressing toward goals   Recommendation   OT Discharge Recommendation 24 hour supervision/assist  (and outpt OT)   OT Therapy Minutes   OT Time In 0700   OT Time Out 0800   OT Total Time (minutes) 60   OT Mode of treatment - Individual (minutes) 60   OT Mode of treatment - Concurrent (minutes) 0   OT Mode of treatment - Group (minutes) 0   OT Mode of treatment - Co-treat (minutes) 0   OT Mode of Teatment - Total time(minutes) 60 minutes   Therapy Time missed   Time missed?  No   Carlito Kash, [address]

## 2018-08-12 NOTE — INCIDENTAL FINDINGS
1) Right kidney cysts: Please follow up with Dr Tracy Mena for further testing/treatment/monitoring if any as they deem necessary     2) cirrhosis/portal hypertension: Please follow up with your family doctor for further testing/treatment if any and/or specialist referral if any as they deem necessary     3) splenomegaly: Please follow up with your family doctor for further testing/treatment if any and/or specialist referral if any as they deem necessary     4) grade 1 diastolic dysfunction: Please follow up with your family doctor for further testing/treatment if any and/or specialist referral if any as they deem necessary      5) Rightt carotid and bilateral vertebral artery stenosis: Please follow up at [facility] or [facility] for further testing/treatment/monitoring if any as they deem necessary     6) sinus mucosal disease: Please follow up with your family doctor for further testing/treatment if any and/or specialist referral if any as they deem necessary

## 2018-08-12 NOTE — PROGRESS NOTES
08/12/18 0830   Pain Assessment   Pain Assessment No/denies pain   Pain Score No Pain   Restrictions/Precautions   Precautions Fall Risk;Impulsive;Bed/chair alarms;Cognitive;Supervision on toilet/commode;Visual deficit   Weight Bearing Restrictions No   ROM Restrictions No   Cognition   Overall Cognitive Status Impaired   Arousal/Participation Alert   Attention Attends with cues to redirect   Orientation Level Oriented to place;Oriented to person;Oriented to situation;Disoriented to time   Memory Decreased short term memory;Decreased recall of recent events;Decreased recall of precautions   Following Commands Follows multistep commands with increased time or repetition   Subjective   Subjective pt has no complaints and expressed excitement of going home tomorrow  QI: Roll Left and Right   Assistance Needed Independent   Roll Left and Right CARE Score 6   QI: Sit to Lying   Assistance Needed Independent   Sit to Lying CARE Score 6   QI: Lying to Sitting on Side of Bed   Assistance Needed Independent   Lying to Sitting on Side of Bed CARE Score 6   QI: Sit to Stand   Assistance Needed Supervision;Verbal cues   Comment better carry over with hand placement when transferring from regular chair but requires step by step cueing when transferring from other surface   Sit to Stand CARE Score 4   Bed Mobility   Findings mod indep for rolling   QI: Chair/Bed-to-Chair Transfer   Assistance Needed Supervision;Verbal cues   Chair/Bed-to-Chair Transfer CARE Score 4   Transfer Bed/Chair/Wheelchair   Limitations Noted In LE Strength; Coordination;Balance;Problem Solving   Adaptive Equipment Roller Walker   Stand Pivot Supervision   Sit to Stand Supervision   Stand to Sit Supervision   Supine to Sit Modified Independent   Sit to Supine Modified Independent   Car Transfer Supervision   Bed, Chair, Wheelchair Transfer (FIM) 5 - Patient requires supervision/monitoring   QI: Car Transfer   Assistance Needed Supervision;Verbal cues Car Transfer CARE Score 4   QI: Walk 10 Feet   Assistance Needed Supervision;Verbal cues   Walk 10 Feet CARE Score 4   QI: Walk 50 Feet with Two Turns   Assistance Needed Supervision;Verbal cues   Walk 50 Feet with Two Turns CARE Score 4   QI: Walk 150 Feet   Assistance Needed Physical assistance   Assistance Provided by [place] Less than 25%   Comment 1 slight LOB walking back to his room as pt let go of the walker on one side to scratch an itch tipping walker to the R side  otherwise only required CS with verbal cues to slow down or obstacle negotiation   Walk 150 Feet CARE Score 3   QI: Walking 10 Feet on Uneven Surfaces   Assistance Needed Supervision;Verbal cues   Comment ramp with RW   Walking 10 Feet on Uneven Surfaces CARE Score 4   Ambulation   Does the patient walk? 2  Yes   Primary Discharge Mode of Locomotion Walk   Walk Assist Level Minimum Assist;Close Supervision   Gait Pattern Inconsistant Prachi; Improper weight shift; Ataxic   Assist Device Roller Dorothy Church Walked (feet) 150 ft  (450, 300)   Limitations Noted In [facility] Management;Strength; Safety   Walking (FIM) 4 - Patient requires steadying assist or light touching AND distance 150 feet or more, no rest   QI: Wheel 50 Feet with Two Turns   Reason if not Attempted Activity not applicable   Wheel 50 Feet with Two Turns CARE Score 9   QI: Wheel 150 Feet   Reason if not Attempted Activity not applicable   Wheel 103 Feet CARE Score 9   Wheelchair mobility   QI: Does the patient use a wheelchair? 0   No   Wheelchair (FIM) 0 - Activity does not occur   QI: 1 Step (Curb)   Assistance Needed Supervision;Verbal cues   Comment cues for sequencing and walker management - tendency to lift with turns    1 Step (Curb) CARE Score 4   QI: 4 Steps   Assistance Needed Supervision;Verbal cues   4 Steps CARE Score 4   QI: 12 Steps   Assistance Needed Supervision;Verbal cues   12 Steps CARE Score 4   Stairs   Type Stairs;Curb;Ramp   # of Steps 12  (FF) Weight Bearing Precautions Fall Risk   Assist Devices Roller Walker;Single Rail   Findings CS with step by step cueing on FF or during curb/ramp negotiation with RW    Stairs (FIM) 5 - Patient requires supervision/monitoring AND goes up and down full flight (12- 14 stairs)   QI: Toilet Transfer   Assistance Needed Supervision;Verbal cues   Toilet Transfer CARE Score 4   Toilet Transfer   Surface Assessed Standard Toilet   Limitations Noted In [facility]; Safety   Findings CS for transfer   Toilet Transfer (FIM) 5 - Patient requires supervision/monitoring   Therapeutic Interventions   Neuromuscular Re-Education alt tapping onn 4" block x 20 reps, SLS x 3 SH x 20 reps with HHA on R UE, static standing with narrow JOSE DAVID with EO x 2 mins then progress to EC x 30 SH x 5 reps with 1 slight LOB episode, semi tandem standing x 1 min each side with no LOB    Equipment Use   NuStep L3 x 15 mins with 1 rest break at 10 mins   Other Comments   Comments TUG test time: 22 14 secs- not a safe community ambulator at this time with increase risk for falls   Assessment   Treatment Assessment Pt tolerated tx session which focused on functional mobility training with emphasis on carry over of safety practices and proper techniques, standing balance training and strengthening  Pt continues to require safety cueing for all functional mobilities using a RW  He mostly requires CS with transfers and ambulation using a RW, although required min A x1  with slight LOB during ambulation task  Increase verbal cueing for sequencing is needed during FF negotiation or curb and ramp negotiation using a RW with increase tendency to lift walker instead of pushing, although no LOB demonstrated during those instances  Pt has no concerns expressed regarding home d/c tomorrow  Family/Caregiver Present wife   Problem List Decreased endurance; Impaired balance;Decreased mobility; Decreased cognition; Impaired judgement;Decreased safety awareness;Decreased coordination   Barriers to Discharge Inaccessible home environment;Decreased caregiver support   PT Barriers   Physical Impairment Impaired balance;Decreased mobility; Decreased coordination;Decreased cognition; Impaired judgement;Decreased safety awareness; Impaired vision;Obesity; Decreased skin integrity   Functional Limitation Walking;Transfers   Plan   Treatment/Interventions Functional transfer training;LE strengthening/ROM; Therapeutic exercise; Endurance training;Bed mobility;Gait training;Spoke to nursing;OT;Cognitive reorientation   Progress Progressing toward goals   Recommendation   Recommendation 24 hour supervision/assist;Outpatient PT; Home with family support   Equipment Recommended Walker   PT - OK to Discharge Yes   PT Therapy Minutes   PT Time In 0830   PT Time Out 1000   PT Total Time (minutes) 90   PT Mode of treatment - Individual (minutes) 30   PT Mode of treatment - Concurrent (minutes) 60   PT Mode of treatment - Group (minutes) 0   PT Mode of treatment - Co-treat (minutes) 0   PT Mode of Teatment - Total time(minutes) 90 minutes   Therapy Time missed   Time missed?  No

## 2018-08-12 NOTE — PROGRESS NOTES
Internal Medicine Progress Note  Patient: Mica Marin  Age/sex: 62 y o  male  Medical Record #: [id number]      ASSESSMENT/PLAN:  Mica Marin is seen and examined and mangement for following issues:    # L CVA:  S/p tPA w/small ICH post infusion  Continue ASA/Statin as well as aggressive lifestyle modifications; aggressive therapy per PMR  Outpt Neurology follow-up       # DM II:  Previously uncontrolled d/t non compliance; will continue Novolog 70/30 35U 2x daily  Continue SSI and accuchecks  Adjust medications as needed  Need to keep much tighter control - was discussed with pt's wife and pt  BS:  8 7, 256, 134     # CKD III:  Previous baseline was 1 8-2 2, now more like 2 0-2 4  Currently off ACE inhibitor, can transition amlodipine to ACE once creatinine shows stability per renal  shows stability per renal   Creatinine 2 2 today     # HTN:  Cont Coreg 6 25 mg 2 times daily  Renal added amlodipine 5mg bid; will monitor trend over next 24 hours, if remains elevated may need to add another agent     # h/o spinal stimulator:  Stable     # Non compliance:  Cont to stress the importance of compliance    # DM Neuropathy:  Refused tablet formulation due to difficulty swallowing  Changed to liquid and pt is taking the medication  Subjective: Patient seen and examined  Pt reports he is doing well  He denies any current complaints      ROS:   GI: denies abdominal pain, change bowel habits or reflux symptoms  Neuro: No new neurologic changes  Respiratory: No Cough, SOB  Cardiovascular: No CP, palpitations     Scheduled Meds:    Current Facility-Administered Medications:  acetaminophen 650 mg Oral Q6H PRN Aroldo Jacob MD   amLODIPine 5 mg Oral Q12H Joshua Beverly MD   aspirin 81 mg Oral Daily Aroldo Jacob MD   atorvastatin 80 mg Oral Daily With Courtney Larson MD   carvedilol 6 25 mg Oral BID With Meals Joshua Beverly MD   famotidine 20 mg Oral Daily Aroldo Jacob MD   gabapentin 600 mg Oral Daily Mallory Cuenca MD   insulin aspart protamine-insulin aspart 35 Units Subcutaneous BID AC Mallory Cuenac MD   insulin lispro 1-5 Units Subcutaneous TID AC Mallory Cuenca MD   insulin lispro 1-5 Units Subcutaneous HS Mallory Cuenca MD   neomycin-bacitracin-polymyxin b 1 small application Topical BID Karen Taylor PA-C   polyethylene glycol 17 g Oral Daily PRN Mallory Cuenca MD       Labs:       Results from last 7 days  Lab Units 08/09/18  0525 08/06/18  0521   WBC Thousand/uL 7 58 8 33   HEMOGLOBIN g/dL 12 1 12 2   HEMATOCRIT % 38 1 38 2   PLATELETS Thousands/uL 160 156       Results from last 7 days  Lab Units 08/09/18  0525 08/07/18  0555   SODIUM mmol/L 139 141   POTASSIUM mmol/L 4 2 4 8   CHLORIDE mmol/L 109* 107   CO2 mmol/L 25 28   BUN mg/dL 29* 30*   CREATININE mg/dL 2 20* 2 29*   GLUCOSE RANDOM mg/dL 112 109   CALCIUM mg/dL 9 0 9 0                  Glucose (mg/dL)   Date Value   08/09/2018 112   08/07/2018 109   08/06/2018 106   08/05/2018 123   01/03/2016 189 (H)   01/02/2016 178 (H)   01/01/2016 113   12/31/2015 128     Glucose, i-STAT (mg/dl)   Date Value   07/29/2018 213 (H)       Labs reviewed    Physical Examination:  Vitals:   Vitals:    08/11/18 1349 08/11/18 2017 08/12/18 0606 08/12/18 1353   BP: 141/79 150/74 126/63 123/66   BP Location: Right arm Left arm Left arm    Pulse: 67 71 69 71   Resp: 20 16 20 16   Temp: 97 6 °F (36 4 °C) 98 °F (36 7 °C) 97 7 °F (36 5 °C) 97 9 °F (36 6 °C)   TempSrc: Oral Oral Oral Oral   SpO2: 96% 99% 94% 96%   Weight:       Height:           PERRLA EOMI no JVD  RESP: CTAB, no R/R/W  CV: +S1 S2, regular rate, no rubs  ABD: soft, NT, ND, normal BS   EXT:  No edema  Neuro:  Alert and oriented x3  Gravelly voice  Right facial weakness  4/5 strength in the right upper and lower extremity  [ X ] Total time spent: 30 Mins and greater than 50% of this time was spent counseling/coordinating care      ** Please Note: Dragon 360 Dictation voice to text software may have been used in the creation of this document   **

## 2018-08-12 NOTE — PROGRESS NOTES
Occupational Therapy Treatment Note:       08/12/18 1300   Pain Assessment   Pain Assessment No/denies pain   Pain Score No Pain   Restrictions/Precautions   Precautions Bed/chair alarms;Cognitive; Fall Risk;Impulsive;Supervision on toilet/commode;Visual deficit   Weight Bearing Restrictions No   ROM Restrictions No   QI: Picking Up Object   Assistance Needed Adaptive equipment;Set-up / clean-up; Verbal cues; Incidental touching   Assistance Provided by [place] No physical assistance   Comment Pt completed item retrieval from floor level using reacher LHAE and RW bag with CGA/CS and VCs for technique/safety with RW use (i e  both hands on RW with functional mobility) Provided pt's wife with handouts on folding reacher and RW bag available for purchase through [employer]  Picking Up Object CARE Score 4   QI: Sit to Stand   Assistance Needed Adaptive equipment;Set-up / clean-up;Supervision;Verbal cues   Assistance Provided by [place] No physical assistance   Comment VCs for hand placement and safe RW use  Sit to Stand CARE Score 4   QI: Chair/Bed-to-Chair Transfer   Assistance Needed Adaptive equipment;Set-up / clean-up;Supervision;Verbal cues   Assistance Provided by [place] No physical assistance   Comment VCs for safe RW use  Chair/Bed-to-Chair Transfer CARE Score 4   Transfer Bed/Chair/Wheelchair   Positioning Concerns Cognition   Limitations Noted In Balance; Coordination; Endurance;Problem Solving;LE Strength   Adaptive Equipment Roller Walker   Stand Pivot Supervision   Sit to Stand Supervision   Stand to Sit Supervision   Findings Pt completed functional mobility from pt room to OT gym using RW with CS and VCs for safe technique (i e slow pace, particulalry around turns, keep both feet within RW)   Bed, Chair, Wheelchair Transfer (FIM) 5 - Patient requires supervision/monitoring   Exercise Tools   Exercise Tools Yes   Hand Gripper Pt completed R hand  strengthening exercises using calibrated hand gripper at 2 sets/10 reps  Discussed with pt's wife various places item available for purchase  Other Exercise Tool 1 Pt completed R hand /digit strengthening exercises with blue resistive foam block at 2 sets/10 reps; provided pt with block for carryover upon return to home  VCs warranted for successful technique of exercises; pt's wife present and verbalized confidence with providing pt with necessary VCs  Cognition   Overall Cognitive Status Impaired   Arousal/Participation Alert; Cooperative   Attention Attends with cues to redirect   Orientation Level Oriented to person;Oriented to place;Oriented to situation   Memory Decreased short term memory;Decreased recall of recent events;Decreased recall of precautions   Following Commands Follows one step commands with increased time or repetition   Activity Tolerance   Activity Tolerance Patient tolerated treatment well   Assessment   Treatment Assessment OT tx session focused on transfers, standing balance, functional mobility, item retrieval, and R hand /digit strengthening  Refer above for details on pt performance  Pt scheduled to be D/C'd tomorrow, 8/13/18, to return home with 24 hr supervision assist available; wife reported pt will go to an adult day care service while she is at work  OT Family training done with: Pt's wife  Prognosis Good   Problem List Decreased strength;Decreased endurance; Impaired balance;Decreased mobility; Decreased coordination;Decreased cognition; Impaired judgement;Decreased safety awareness; Impaired vision   Barriers to Discharge Inaccessible home environment;Decreased caregiver support   Plan   Treatment/Interventions Functional transfer training; Therapeutic exercise; Endurance training;Cognitive reorientation;Patient/family training;Equipment eval/education; Compensatory technique education;OT;Family   Progress Progressing toward goals   Recommendation   OT Discharge Recommendation 24 hour supervision/assist  (and oupt OT  )   OT Therapy Minutes   OT Time In 0100   OT Time Out 0130   OT Total Time (minutes) 30   OT Mode of treatment - Individual (minutes) 30   OT Mode of treatment - Concurrent (minutes) 0   OT Mode of treatment - Group (minutes) 0   OT Mode of treatment - Co-treat (minutes) 0   OT Mode of Teatment - Total time(minutes) 30 minutes   Therapy Time missed   Time missed?  No   Felisha Clark, [address]

## 2018-08-12 NOTE — INCIDENTAL FINDINGS
1) Right kidney cysts: Please follow up with Dr Chris Tovar for further testing/treatment/monitoring if any as they deem necessary     2) cirrhosis/portal hypertension: Please follow up with your family doctor for further testing/treatment if any and/or specialist referral if any as they deem necessary     3) splenomegaly: Please follow up with your family doctor for further testing/treatment if any and/or specialist referral if any as they deem necessary     4) grade 1 diastolic dysfunction: Please follow up with your family doctor for further testing/treatment if any and/or specialist referral if any as they deem necessary      5) Rightt carotid and bilateral vertebral artery stenosis: Please follow up at [address] [facility] or [facility] for further testing/treatment/monitoring if any as they deem necessary     6) sinus mucosal disease: Please follow up with your family doctor for further testing/treatment if any and/or specialist referral if any as they deem necessary     7) elevated alkaline phosphatase: Please follow up with your family doctor for further testing/treatment if any and/or specialist referral if any as they deem necessary

## 2018-08-13 VITALS
WEIGHT: 235.67 LBS | OXYGEN SATURATION: 96 % | DIASTOLIC BLOOD PRESSURE: 65 MMHG | HEART RATE: 72 BPM | SYSTOLIC BLOOD PRESSURE: 141 MMHG | BODY MASS INDEX: 35.72 KG/M2 | RESPIRATION RATE: 20 BRPM | TEMPERATURE: 97.6 F | HEIGHT: 68 IN

## 2018-08-13 PROBLEM — K21.9 GERD (GASTROESOPHAGEAL REFLUX DISEASE): Status: ACTIVE | Noted: 2018-08-13

## 2018-08-13 LAB
ANION GAP SERPL CALCULATED.3IONS-SCNC: 6 MMOL/L (ref 4–13)
BUN SERPL-MCNC: 30 MG/DL (ref 5–25)
CALCIUM SERPL-MCNC: 9.3 MG/DL (ref 8.3–10.1)
CHLORIDE SERPL-SCNC: 107 MMOL/L (ref 100–108)
CO2 SERPL-SCNC: 26 MMOL/L (ref 21–32)
CREAT SERPL-MCNC: 2.2 MG/DL (ref 0.6–1.3)
GFR SERPL CREATININE-BSD FRML MDRD: 32 ML/MIN/1.73SQ M
GLUCOSE P FAST SERPL-MCNC: 88 MG/DL (ref 65–99)
GLUCOSE SERPL-MCNC: 104 MG/DL (ref 65–140)
GLUCOSE SERPL-MCNC: 174 MG/DL (ref 65–140)
GLUCOSE SERPL-MCNC: 88 MG/DL (ref 65–140)
POTASSIUM SERPL-SCNC: 4.7 MMOL/L (ref 3.5–5.3)
RYE IGE QN: NEGATIVE
SODIUM SERPL-SCNC: 139 MMOL/L (ref 136–145)

## 2018-08-13 PROCEDURE — 99232 SBSQ HOSP IP/OBS MODERATE 35: CPT | Performed by: INTERNAL MEDICINE

## 2018-08-13 PROCEDURE — 99239 HOSP IP/OBS DSCHRG MGMT >30: CPT | Performed by: PHYSICAL MEDICINE & REHABILITATION

## 2018-08-13 PROCEDURE — 82948 REAGENT STRIP/BLOOD GLUCOSE: CPT

## 2018-08-13 PROCEDURE — 80048 BASIC METABOLIC PNL TOTAL CA: CPT | Performed by: INTERNAL MEDICINE

## 2018-08-13 RX ORDER — CARVEDILOL 6.25 MG/1
6.25 TABLET ORAL 2 TIMES DAILY WITH MEALS
Qty: 60 TABLET | Refills: 0 | Status: SHIPPED | OUTPATIENT
Start: 2018-08-13 | End: 2018-08-16 | Stop reason: SDUPTHER

## 2018-08-13 RX ORDER — ATORVASTATIN CALCIUM 80 MG/1
80 TABLET, FILM COATED ORAL
Qty: 30 TABLET | Refills: 0 | Status: SHIPPED | OUTPATIENT
Start: 2018-08-13 | End: 2018-09-10 | Stop reason: SDUPTHER

## 2018-08-13 RX ORDER — ADHESIVE BANDAGE 3/4"
BANDAGE TOPICAL
Qty: 1 EACH | Refills: 0 | Status: SHIPPED | OUTPATIENT
Start: 2018-08-13

## 2018-08-13 RX ORDER — FAMOTIDINE 20 MG/1
20 TABLET, FILM COATED ORAL DAILY
COMMUNITY
End: 2020-04-27

## 2018-08-13 RX ORDER — AMLODIPINE BESYLATE 5 MG/1
5 TABLET ORAL EVERY 12 HOURS
Qty: 60 TABLET | Refills: 0 | Status: SHIPPED | OUTPATIENT
Start: 2018-08-13 | End: 2018-09-10 | Stop reason: SDUPTHER

## 2018-08-13 RX ORDER — BACITRACIN, NEOMYCIN, POLYMYXIN B 400; 3.5; 5 [USP'U]/G; MG/G; [USP'U]/G
1 OINTMENT TOPICAL 2 TIMES DAILY
COMMUNITY
End: 2019-07-09

## 2018-08-13 RX ADMIN — BACITRACIN, NEOMYCIN, POLYMYXIN B 1 SMALL APPLICATION: 400; 3.5; 5 OINTMENT TOPICAL at 08:06

## 2018-08-13 RX ADMIN — INSULIN ASPART 35 UNITS: 100 INJECTION, SUSPENSION SUBCUTANEOUS at 08:04

## 2018-08-13 RX ADMIN — GABAPENTIN 600 MG: 250 SUSPENSION ORAL at 08:10

## 2018-08-13 RX ADMIN — CARVEDILOL 6.25 MG: 6.25 TABLET, FILM COATED ORAL at 08:08

## 2018-08-13 RX ADMIN — FAMOTIDINE 20 MG: 20 TABLET ORAL at 08:08

## 2018-08-13 RX ADMIN — INSULIN LISPRO 1 UNITS: 100 INJECTION, SOLUTION INTRAVENOUS; SUBCUTANEOUS at 11:48

## 2018-08-13 RX ADMIN — AMLODIPINE BESYLATE 5 MG: 5 TABLET ORAL at 08:08

## 2018-08-13 RX ADMIN — ASPIRIN 81 MG: 81 TABLET, COATED ORAL at 08:08

## 2018-08-13 NOTE — SPEECH THERAPY NOTE
SLP Discharge Summary    Pt was discharged home w/ family supervision/support on 8/13/2018  At time of d/c, pt was able to achieve 5/5 LTG's, but will continue to benefit from continued OP SLP services at time of d/c to maximize cognitive linguistic skills  Pt completed formalized cognitive linguistic assessment (CLQT+), where pt's overall score was 1 0, indicating severe cognitive impairments when compared to age matched peers between 18-69 yrs  Pt noted to have increased difficulty in ST and LT memory recall, executive function tasks, attention and noted anomia in structured as well as conversational speech  Pt's wife had been present for a number of sessions, where education and training given for strategies to improve STM recall  Additionally, pt engaging in financial management task, where pt exhibited increased errors were observed where pt's wife educated on checking over work at this time due to increased errors elicited during tasks  Pt was able to improve memory and executive function skills from max A to mod A and comprehension/expression skills from mod-max A to min A at time of d/c  Pt will continue to benefit from SLP services as an OP to maximize cognitive linguistic skills at this time

## 2018-08-13 NOTE — DISCHARGE INSTRUCTIONS
Please have your blood work drawn as instructed the results will be sent to Dr Gretchen Perez    Please note you are restricted from driving/operating a motorized vehicle/operating heavy machinery/etc until you are cleared through a formal driving evaluation  This service is offered through [address] [place] [address] driving evaluation program on [address] however this evaluation can be done at a site of your choosing  Please contact your family doctor for a referral when your family doctor clears you to perform this evaluation once your neurologic/physical deficits have stabilized       Please see your doctors listed in the follow up providers section of your discharge paperwork, and take the discharge paperwork with you to your appointments    Please note changes may have been made to your medications please refer to your discharge paperwork for your current medications and take this list with you to all your doctors appointments for your doctors to review    Please do not resume a home medication unless the medication reconciliation sheet indicates to do so, please do not assume that a medication that you were given a prescription for is the same as a medication you have at home based on both medications having the same name as dosages and frequency may have changed      Please check your blood sugars 4 times daily before meals and at bedtime and record them to provide to your doctors, contact your family doctor immediately for blood sugars higher than 220 or lower than 100       Please check your blood pressure & heart rate/pulse prior to taking your blood pressure/heart rate medications and 1 hour after, please contact your family doctor immediately for a blood pressure below 100/60 and do not take the medications until speaking with them, please contact your family doctor immediately for blood pressure greater than 160/100; please contact your family doctor immediately for a heart rate/pulse lower than 60 and do not take the medications until speaking with them, please contact your family doctor immediately for heart rate/pulse greater than 100     Please note the following incidental findings were found during your recent hospitalization please discuss them with your doctors so that they may arrange any tests/referrals as they deem necessary:       1) Right kidney cysts: Please follow up with Dr Belkis Price for further testing/treatment/monitoring if any as they deem necessary     2) cirrhosis/portal hypertension: Please follow up with your family doctor for further testing/treatment if any and/or specialist referral if any as they deem necessary     3) splenomegaly: Please follow up with your family doctor for further testing/treatment if any and/or specialist referral if any as they deem necessary     4) grade 1 diastolic dysfunction: Please follow up with your family doctor for further testing/treatment if any and/or specialist referral if any as they deem necessary      5) Rightt carotid and bilateral vertebral artery stenosis: Please follow up at [address] [facility] or [facility] for further testing/treatment/monitoring if any as they deem necessary     6) sinus mucosal disease: Please follow up with your family doctor for further testing/treatment if any and/or specialist referral if any as they deem necessary     7) elevated alkaline phosphatase: Please follow up with your family doctor for further testing/treatment if any and/or specialist referral if any as they deem necessary       Please avoid NSAID (including but not limited to advil, aleve, motrin, naproxen, ibuprofen, mobic, meloxicam, diclofenac etc) medications unless cleared to do so by your doctors as these medications can potentially cause worsening of your chronic kidney disease        Please avoid NSAID (including but not limited to advil, aleve, motrin, naproxen, ibuprofen, mobic, meloxicam, diclofenac etc) medications, anti platelet medications (including but not limited to plavix and plavix containing products), and any prescription blood thinners due to your already being on aspirin and when combined with these other medications they can increase your risk of bleeding; you may be cleared to use these medications in the future but do not do so unless advised to do so by your doctors      Please note a summary of your hospital stay with relevant information for your doctors has been sent to them, please confirm with your doctors at your follow up visits that they have received this summary and have them contact [address] [place] [address] if they have not received them along with any other medical records they may require

## 2018-08-13 NOTE — PROGRESS NOTES
NEPHROLOGY PROGRESS NOTE   Alicia Platt 62 y o  male MRN: [id number]  Unit/Bed#: [facility] 048-57 Encounter: [id number]  Reason for Consult: ADEOLA/CKD    ASSESSMENT AND PLAN:  62year old male with a past medical history of uncontrolled diabetes type 2, neuropathy, hypertension, hyperlipidemia, who initially presented to the hospital on 07/29 and was found to have acute left-sided CVA after presenting with slurred speech facial droop and right hemiparesis  Patient received tPA, which was complicated by small intracranial hemorrhage  [facility]  Nephrology is consulted for acute kidney injury  Likely progressive worsening CKD, baseline serum creatinine 1 8 going back to July 2017, follows with Dr Amado Jacobsen  -creatinine seems to be plateaued in the range of 2 2 which may represent his newer baseline  His Cr 2 2 in 4/2018  -CKD likely secondary to long-term diabetes, hypertension  -urinalysis this admission shows 4+ proteinuria, 2 to 4 RBCs  -complements negative, FLC ratio 2 7, pending workup includes SPEP, UPEP, SOLEDAD, Anca, results to follow  -avoid nephrotoxins or NSAIDs  -overall stable from Nephrology standpoint for discharge    Nephrotic range proteinuria  -last UPC ratio 4 3 g in August 2018  -proteinuria presumed to be secondary to diabetes/hypertension  -last hemoglobin A1c 7 5 in July 2018  Patient does have history of uncontrolled diabetes and diabetic retinopathy as per patient in the past   -serological workup as above    Hypertension  -current remains on amlodipine 5 mg p o  b i d , carvedilol 6 25 mg p o  B i d   -will eventually need ACE-inhibitor which can be done as an outpatient if renal function remains stable   -blood pressure overall acceptable although slightly above goal  -continue to monitor blood pressure at home if possible    CKD BMD, can monitor phosphorus and PTH level as outpatient      CVA, management as per primary team    SUBJECTIVE:  Patient seen and examined at bedside  No chest pain, shortness of breath, nausea, vomiting, abdominal pain or diarrhea  No urinary complaints       OBJECTIVE:  Current Weight: Weight - Scale: 107 kg (235 lb 10 8 oz)  Vitals:    08/13/18 0542   BP: 141/65   Pulse: 72   Resp: 20   Temp: 97 6 °F (36 4 °C)   SpO2: 96%       Intake/Output Summary (Last 24 hours) at 08/13/18 3977  Last data filed at 08/13/18 0554   Gross per 24 hour   Intake             1260 ml   Output                0 ml   Net             1260 ml       Physical Examination:  General:  Sitting in chair, no acute distress   Eyes:  No conjunctival pallor, sclerae anicteric  ENT:  External examination of ears and nose unremarkable  Neck:  Supple  Respiratory:  Bilateral air entry present, no crackles appreciated  CVS:  S1, S2 present  GI:  Soft, nontender, nondistended  CNS:  Active alert oriented x3  Extremities:  No significant edema in lower extremities  Skin:  No new rash in legs    Medications:    Current Facility-Administered Medications:     acetaminophen (TYLENOL) tablet 650 mg, 650 mg, Oral, Q6H PRN, Basil Cosme MD, 650 mg at 08/07/18 1603    amLODIPine (NORVASC) tablet 5 mg, 5 mg, Oral, Q12H, Ashley Hodges MD, 5 mg at 08/13/18 0808    aspirin (ECOTRIN LOW STRENGTH) EC tablet 81 mg, 81 mg, Oral, Daily, Basil Cosme MD, 81 mg at 08/13/18 0808    atorvastatin (LIPITOR) tablet 80 mg, 80 mg, Oral, Daily With Abdias Dorman MD, 80 mg at 08/12/18 1635    carvedilol (COREG) tablet 6 25 mg, 6 25 mg, Oral, BID With Meals, Ashley Hdoges MD, 6 25 mg at 08/13/18 0808    famotidine (PEPCID) tablet 20 mg, 20 mg, Oral, Daily, Basil Cosme MD, 20 mg at 08/13/18 0808    gabapentin (NEURONTIN) oral solution 600 mg, 600 mg, Oral, Daily, Basil Cosme MD, 600 mg at 08/13/18 0810    insulin aspart protamine-insulin aspart (NovoLOG 70/30) 100 units/mL subcutaneous injection 35 Units, 35 Units, Subcutaneous, BID AC, Basil Cosme MD, 35 Units at 08/13/18 0804    insulin lispro (HumaLOG) 100 units/mL subcutaneous injection 1-5 Units, 1-5 Units, Subcutaneous, TID AC, 2 Units at 08/12/18 1117 **AND** Fingerstick Glucose (POCT), , , TID AC, Brittnee Kenyon MD    insulin lispro (HumaLOG) 100 units/mL subcutaneous injection 1-5 Units, 1-5 Units, Subcutaneous, HS, Brittnee Kenyon MD, 1 Units at 08/09/18 2155    neomycin-bacitracin-polymyxin b (NEOSPORIN) ointment 1 small application, 1 small application, Topical, BID, Karen Taylor PA-C, 1 small application at 63/48/97 0806    polyethylene glycol (MIRALAX) packet 17 g, 17 g, Oral, Daily PRN, Brittnee Kenyon MD, 17 g at 08/12/18 0958    Laboratory Results:    Results from last 7 days  Lab Units 08/13/18  0553 08/09/18  0525 08/07/18  0555   WBC Thousand/uL  --  7 58  --    HEMOGLOBIN g/dL  --  12 1  --    HEMATOCRIT %  --  38 1  --    PLATELETS Thousands/uL  --  160  --    SODIUM mmol/L 139 139 141   POTASSIUM mmol/L 4 7 4 2 4 8   CHLORIDE mmol/L 107 109* 107   CO2 mmol/L 26 25 28   BUN mg/dL 30* 29* 30*   CREATININE mg/dL 2 20* 2 20* 2 29*   CALCIUM mg/dL 9 3 9 0 9 0   GLUCOSE RANDOM mg/dL 88 112 109       No results found for this or any previous visit  Results for orders placed during the hospital encounter of 10/24/16   XR chest pa & lateral    Narrative CHEST    INDICATION: Foot infection    COMPARISON: 12/28/2015 and prior    VIEWS:  Frontal and lateral projections; 2 images    FINDINGS:    The cardiomediastinal silhouette is stable  Scarring left base, otherwise clear  No pleural effusion  Bones are stable  Impression No active disease  Workstation performed: [id number]       No results found for this or any previous visit  No results found for this or any previous visit  Results for orders placed during the hospital encounter of 08/16/16   CT abdomen pelvis without contrast    Narrative CT ABDOMEN AND PELVIS WITHOUT IV CONTRAST - LOW DOSE RENAL STONE     INDICATION: Left lower quadrant pain    Left flank pain  COMPARISON: None  TECHNIQUE:  Low dose thin section CT examination of the abdomen and pelvis was performed without intravenous or oral contrast according to a protocol specifically designed to evaluate for urinary tract calculus  Axial, sagittal and coronal reformatted   images were submitted for interpretation  This examination, like all CT scans performed in the [facility], was performed utilizing techniques to minimize radiation dose exposure, including the use of iterative reconstruction and   automated exposure control  Evaluation for pathology in the abdomen and pelvis that is unrelated to urinary tract calculi is limited  FINDINGS:    RIGHT KIDNEY AND URETER:  No urinary tract calculi  Incompletely evaluated right renal cysts are stable  No hydronephrosis or hydroureter  No perinephric collection  LEFT KIDNEY AND URETER:  No urinary tract calculi  No hydronephrosis or hydroureter  No perinephric collection  Retroaortic left renal vein is noted  URINARY BLADDER:  Unremarkable  Heart is enlarged  Coronary artery calcifications are noted  No other significant abnormality in the visualized lung bases  Liver is enlarged and diffusely decreased in density  Lobulated hepatic contours are noted  Findings are consistent with   hepatic cirrhosis and are similar when compared with December 2015  Spleen is also enlarged measuring 16 cm in keeping with portal hypertension  Small omental varices are noted  There is extensive atherosclerotic vascular calcification  Borderline   and mildly enlarged periportal and gastrohepatic ligament lymph nodes are identified  There is fatty infiltration of the pancreas with no evidence of pancreatic mass or pancreatic ductal dilatation  Again noted is a duodenal diverticulum  No adrenal   abnormality is identified  Gallbladder is surgically absent    Limited evaluation demonstrates no evidence to suggest acute appendicitis  No acute fracture or destructive osseous lesion is identified  Sacral pain stimulator device is noted with generator in the subcutaneous tissues of the right buttocks  Impression No urinary tract calculi or obstructive uropathy  No low-dose noncontrast CT abnormality to definitively account for the patient's symptoms  Hepatic cirrhosis with portal hypertension as evidenced by small omental and left gastric varices as well as splenomegaly  Extensive atherosclerotic vascular calcification including coronary artery calcification  Workstation performed: [id number]       No results found for this or any previous visit  Portions of the record may have been created with voice recognition software  Occasional wrong word or "sound a like" substitutions may have occurred due to the inherent limitations of voice recognition software  Read the chart carefully and recognize, using context, where substitutions have occurred

## 2018-08-13 NOTE — DISCHARGE SUMMARY
Discharge Summary - PMR       Admission Date:    8/2/18  Discharge Date:   8/13/18    Diagnosis:   CVA    Comorbidities:   See below for medical details     Hospital Course: The patient had an unremarkable rehab course with significant functional gains made, please see below for medical details:    Bceky Jason is a 62 y o  male who presented to the [facility] with Rt sided weakness, Rt facial droop, and slurred speech  Patient was given TPA and responded well and does not currently have paresis, slurred speech, or facial droop  Subjective: Patient states he feels well and is looking forward to dc today         ROS:  Negative except as oultined above     Assessment/Plan:      Diabetic macular edema (HCC)   Assessment & Plan    · Patient with history of diabetic macular edema, in addition to diabetic retinopathy, vitreous hemorrhage, nuclear sclerosis  · Receives monthly Lucentis injections at [email or web address]  Originally had appointment for repeat injections on 08/09; Office was called and discussion had with Dr Radha Davis, he stated injections are non-urgent and can wait until after discharge next week  Rescheduled appointment with Dr Zhu Hidden for 8/14/18 at 3pm  In addition, [facility] team member will call family to inform them of appointment change and provide re-assurance          CKD (chronic kidney disease)   Assessment & Plan      · New baseline Cr appears to be 2 2-2 4, Cr currently stable at 2 2, renal service managing as inpt, OP FU with Dr Jennifer Chahal as PTA   · Renal ordered the following tests as inpt:  · SPEP/UPEP: pending, d/w Dr Bry Gupta of renal and this should not hold up dc as pt can FU with Dr Jennifer Chahal who will check results and he will manage as OP   · SOLEDAD/ANCA:  pending, d/w Dr Bry Gupta of renal and this should not hold up dc as pt can FU with Dr Jennifer Chahal who will check results and he will manage as OP    · UPCR: elevated, renal aware  · C3/C4 compliment: wnl, renal aware  · Immunoglobulin Lt chains: Ig Kappa, Ig Yadi, Kappa/Yadi ratio all elevated, d/w Dr Cleotilde Osler of renal and felt that this may be simply from decreased clearance due to patient's CKD however the SPEP/UPEP which is pending will be more confirmatory  · Per Dr Cleotilde Osler cleared for dc from renal standpoint  · D/W Dr Cleotilde Osler regarding low K+ diet and as patient is not yet on ACE per Dr Cleotilde Osler patient may be dc'd without K+ restriction  · Scrip provided upon dc for BMP (with eGFR/GFR) with results to Dr Tracy Mena (per Dr Cleotilde Osler ordering phos and PTH levels can be deferred to Dr Tracy Mena)         Elevated alkaline phosphatase level   Assessment & Plan    Chronically elevated and stable going back 1 year, currently mildly elevated at 133 () on 7/28/18, OP FU with PCP with further testing/treatment and/or specialist referral at PCP's discretion            Cognitive impairment   Assessment & Plan    Per family present x 1 year, OP FU [facility] for further testing/management (appt requested)         Neuropathy   Assessment & Plan    Likely 2/2 to DM, on home neurontin 600 qd        Essential hypertension   Assessment & Plan    · Home coreg increased from 3 125 to 6 25 mg BID  · Home norvasc increased from 5 mg qd to q12  · Renal managing as inpt, pt to FU with Dr Tracy Mena as OP           Hyperlipidemia   Assessment & Plan    · Home lovastatin 20 mg qpm changed to lipitor 80 mg qpm per neuro for CVA        Type 2 diabetes mellitus with hyperglycemia (HCC)   Assessment & Plan    · on home novolog 70/30 35 units BID        * Ischemic cerebrovascular accident (CVA) ([facility] [address] )   Assessment & Plan    · On home ASA 81 mg qd  · Home lovastatin 20 mg qpm changed to lipitor 80 mg qpm per neuro  · [facility] FU with [facility], appt requested                  GERD: on home pepcid 20 mg qd  Shin wound: neosporin application BID, no signs of infection, healing well, instructed patient and spouse to continue neosporin (provided upon dc) until fully healed, instructed patient and spouse if not fully healed in 5 days contact PCP as patient is a diabetic and at risk for non-healing ulcer and possible infxn which was also d/w patient and spouse     DVT ppx: SCDs b/l, patient ambulating sufficient distances  Dispo: 8/13     Will defer to PCP with further testing/treatment and/or specialist referral at PCP's discretion      Incidental findings noted on CT A/P 8/2016:  1) Rt renal cysts: OP FU with Dr Uriah Gustafson  2) cirrhosis/portal hypertension: OP FU with PCP with further testing/treatment and/or specialist referral at PCP's discretion   3) splenomegaly: OP FU with PCP with further testing/treatment and/or specialist referral at PCP's discretion        Other incidental findings:  1) grade I DD: OP FU with PCP with further testing/treatment and/or specialist referral at PCP's discretion   2) Rt carotid and B/L vertebral artery stenosis, OP FU at the vascular center or [facility]  3) sinus mucosal dz: asymptomatic, OP FU with PCP with further testing/treatment and/or specialist referral at PCP's discretion            Functional Status Upon Discharge: Mod I amb/tx/ADLs     Condition at Discharge: stable    Discharge instructions/Information to patient and family:   See after visit summary for information provided to patient and family  Provisions for Follow-Up Care:  See after visit summary for information related to follow-up care and any pertinent home health orders  Disposition: home     Planned Readmission: no        Discharge Medications:  See after visit summary for reconciled discharge medications provided to patient and family  * Greater than 30 min was spent today in preparation for patient discharge including discussion with patient, patient's nurse, therapy staff, and case management

## 2018-08-13 NOTE — SOCIAL WORK
Received return  From fax sent 8/10 for authorization not completed  Refaxed without success   TC to [employer] and spoke to  Martina Aldridge faxed to [phone]

## 2018-08-13 NOTE — SOCIAL WORK
CM received TC from Shona Manriquez and Johnathan Aden at [employer] approving patient for 1 day for deductibles and copays auth # [id number]

## 2018-08-13 NOTE — PLAN OF CARE
Patient being discharged to home with spouse providing care and supervision  Instructions given to spouse  Scripts sent to pharmacy

## 2018-08-14 ENCOUNTER — TRANSITIONAL CARE MANAGEMENT (OUTPATIENT)
Dept: INTERNAL MEDICINE CLINIC | Facility: CLINIC | Age: 58
End: 2018-08-14

## 2018-08-14 ENCOUNTER — TELEPHONE (OUTPATIENT)
Dept: INTERNAL MEDICINE CLINIC | Facility: CLINIC | Age: 58
End: 2018-08-14

## 2018-08-14 ENCOUNTER — TELEPHONE (OUTPATIENT)
Dept: NEUROLOGY | Facility: CLINIC | Age: 58
End: 2018-08-14

## 2018-08-14 DIAGNOSIS — E11.65 TYPE 2 DIABETES MELLITUS WITH HYPERGLYCEMIA, UNSPECIFIED WHETHER LONG TERM INSULIN USE (HCC): Chronic | ICD-10-CM

## 2018-08-14 DIAGNOSIS — E11.65 TYPE 2 DIABETES MELLITUS WITH HYPERGLYCEMIA, UNSPECIFIED WHETHER LONG TERM INSULIN USE (HCC): Primary | Chronic | ICD-10-CM

## 2018-08-14 LAB
ALBUMIN SERPL ELPH-MCNC: 3.32 G/DL (ref 3.5–5)
ALBUMIN SERPL ELPH-MCNC: 51.8 % (ref 52–65)
ALBUMIN UR ELPH-MCNC: 80.9 %
ALPHA1 GLOB MFR UR ELPH: 4.3 %
ALPHA1 GLOB SERPL ELPH-MCNC: 0.43 G/DL (ref 0.1–0.4)
ALPHA1 GLOB SERPL ELPH-MCNC: 6.7 % (ref 2.5–5)
ALPHA2 GLOB MFR UR ELPH: 4 %
ALPHA2 GLOB SERPL ELPH-MCNC: 1.08 G/DL (ref 0.4–1.2)
ALPHA2 GLOB SERPL ELPH-MCNC: 16.9 % (ref 7–13)
B-GLOBULIN MFR UR ELPH: 6.1 %
BETA GLOB ABNORMAL SERPL ELPH-MCNC: 0.38 G/DL (ref 0.4–0.8)
BETA1 GLOB SERPL ELPH-MCNC: 6 % (ref 5–13)
BETA2 GLOB SERPL ELPH-MCNC: 6.4 % (ref 2–8)
BETA2+GAMMA GLOB SERPL ELPH-MCNC: 0.41 G/DL (ref 0.2–0.5)
C-ANCA TITR SER IF: NORMAL TITER
GAMMA GLOB ABNORMAL SERPL ELPH-MCNC: 0.78 G/DL (ref 0.5–1.6)
GAMMA GLOB MFR UR ELPH: 4.7 %
GAMMA GLOB SERPL ELPH-MCNC: 12.2 % (ref 12–22)
IGG/ALB SER: 1.07 {RATIO} (ref 1.1–1.8)
MYELOPEROXIDASE AB SER IA-ACNC: <9 U/ML (ref 0–9)
P-ANCA ATYPICAL TITR SER IF: NORMAL TITER
P-ANCA TITR SER IF: NORMAL TITER
PROT PATTERN UR ELPH-IMP: ABNORMAL
PROT SERPL-MCNC: 6.4 G/DL (ref 6.4–8.2)
PROT UR-MCNC: 490 MG/DL
PROTEINASE3 AB SER IA-ACNC: <3.5 U/ML (ref 0–3.5)

## 2018-08-14 RX ORDER — BLOOD-GLUCOSE METER
EACH MISCELLANEOUS 3 TIMES DAILY
Qty: 1 EACH | Refills: 0 | Status: SHIPPED | OUTPATIENT
Start: 2018-08-14 | End: 2018-11-27

## 2018-08-14 RX ORDER — BLOOD-GLUCOSE METER
EACH MISCELLANEOUS 3 TIMES DAILY
Qty: 1 EACH | Refills: 0 | Status: SHIPPED | OUTPATIENT
Start: 2018-08-14 | End: 2018-08-14 | Stop reason: SDUPTHER

## 2018-08-14 NOTE — PHYSICAL THERAPY NOTE
[facility] PT Discharge Summary  Pt made consistent progress in PT  Met S goals for transfers and stair, ramp and curb step negotiation activities  However continued to require min A to CS when ambulating using a RW due to dec safety awareness and dec insight to deficits despite repeated education and training  Family training with wife completed prior to d/c with good carry over demonstrated  DME for a walker submitted to CM and delivered to the pt prior to d/c on 8/13/18  Pt d/c to home with recommendations for family to provide 24/7 S/assist and to continue rehabilitation with outpatient PT services

## 2018-08-15 ENCOUNTER — OFFICE VISIT (OUTPATIENT)
Dept: INTERNAL MEDICINE CLINIC | Facility: CLINIC | Age: 58
End: 2018-08-15
Payer: COMMERCIAL

## 2018-08-15 VITALS
DIASTOLIC BLOOD PRESSURE: 74 MMHG | WEIGHT: 236.6 LBS | RESPIRATION RATE: 18 BRPM | HEART RATE: 71 BPM | SYSTOLIC BLOOD PRESSURE: 140 MMHG | OXYGEN SATURATION: 98 % | BODY MASS INDEX: 35.86 KG/M2 | HEIGHT: 68 IN | TEMPERATURE: 97 F

## 2018-08-15 DIAGNOSIS — Z79.4 TYPE 2 DIABETES MELLITUS WITH STAGE 3 CHRONIC KIDNEY DISEASE, WITH LONG-TERM CURRENT USE OF INSULIN (HCC): ICD-10-CM

## 2018-08-15 DIAGNOSIS — E11.22 TYPE 2 DIABETES MELLITUS WITH STAGE 3 CHRONIC KIDNEY DISEASE, WITH LONG-TERM CURRENT USE OF INSULIN (HCC): ICD-10-CM

## 2018-08-15 DIAGNOSIS — Z09 HOSPITAL DISCHARGE FOLLOW-UP: Primary | ICD-10-CM

## 2018-08-15 DIAGNOSIS — N18.30 TYPE 2 DIABETES MELLITUS WITH STAGE 3 CHRONIC KIDNEY DISEASE, WITH LONG-TERM CURRENT USE OF INSULIN (HCC): ICD-10-CM

## 2018-08-15 DIAGNOSIS — I12.9 BENIGN HYPERTENSION WITH CKD (CHRONIC KIDNEY DISEASE) STAGE III (HCC): ICD-10-CM

## 2018-08-15 DIAGNOSIS — N18.30 BENIGN HYPERTENSION WITH CKD (CHRONIC KIDNEY DISEASE) STAGE III (HCC): ICD-10-CM

## 2018-08-15 DIAGNOSIS — I69.30 HISTORY OF ISCHEMIC CEREBROVASCULAR ACCIDENT (CVA) WITH RESIDUAL DEFICIT: ICD-10-CM

## 2018-08-15 DIAGNOSIS — K74.60 HEPATIC CIRRHOSIS, UNSPECIFIED HEPATIC CIRRHOSIS TYPE, UNSPECIFIED WHETHER ASCITES PRESENT (HCC): ICD-10-CM

## 2018-08-15 DIAGNOSIS — E11.42 DIABETIC POLYNEUROPATHY ASSOCIATED WITH TYPE 2 DIABETES MELLITUS (HCC): ICD-10-CM

## 2018-08-15 PROCEDURE — 99496 TRANSJ CARE MGMT HIGH F2F 7D: CPT | Performed by: INTERNAL MEDICINE

## 2018-08-15 RX ORDER — DIPHENOXYLATE HYDROCHLORIDE AND ATROPINE SULFATE 2.5; .025 MG/1; MG/1
1 TABLET ORAL 4 TIMES DAILY PRN
COMMUNITY
End: 2019-02-03

## 2018-08-15 RX ORDER — INSULIN ASPART 100 [IU]/ML
INJECTION, SUSPENSION SUBCUTANEOUS
Qty: 10 ML | Refills: 0 | Status: ON HOLD | COMMUNITY
Start: 2018-08-15 | End: 2018-09-27

## 2018-08-15 NOTE — CASE MANAGEMENT
Team dc summary - pt made good progress and returned home w/family and contd outpt pt ot and slp at [facility]  Family present for training and for dc instructions

## 2018-08-15 NOTE — PROGRESS NOTES
Assessment and Plan:    Problem List Items Addressed This Visit     Type 2 diabetes mellitus with hyperglycemia (HCC) (Chronic)    Relevant Medications    glucose 4 g chewable tablet    History of ischemic cerebrovascular accident (CVA) with residual deficit    CKD (chronic kidney disease)    Cirrhosis ([facility] [address] )      Other Visit Diagnoses     Hospital discharge follow-up    -  Primary        Health Maintenance Due   Topic Date Due    HIV SCREENING  1960    Depression Screening PHQ-9  1960    CRC Screening: Colonoscopy  1960    PNEUMOCOCCAL POLYSACCHARIDE VACCINE AGE 2-64 HIGH RISK  03/01/1962    Diabetic Foot Exam  03/01/1970    DM Eye Exam  03/01/1970         HPI:  Corinne Hammonds is a 62 y o  male here for his {AWV Welcome/Initial/Subsequent:88047}    Patient Active Problem List   Diagnosis    Type 2 diabetes mellitus with hyperglycemia ([facility] [address] )    Hyperlipidemia    Essential hypertension    History of ischemic cerebrovascular accident (CVA) with residual deficit    Neuropathy    Cognitive impairment    Elevated alkaline phosphatase level    CKD (chronic kidney disease)    Diabetic macular edema (HCC)    GERD (gastroesophageal reflux disease)    Cirrhosis ([facility] [address] )    Diabetic polyneuropathy associated with type 2 diabetes mellitus ([address] )     Past Medical History:   Diagnosis Date    Diabetes mellitus ([address] )     Hypercholesteremia     Hyperlipidemia     Hypertension     Neuropathy     Obesity     Osteomyelitis ([address] )     last assessed 11/4/16    PVC's (premature ventricular contractions)     sees cardiology Dr Vandana Longo     Past Surgical History:   Procedure Laterality Date    ABDOMINAL SURGERY      CHOLECYSTECTOMY      Percutaneous    OTHER SURGICAL HISTORY      "stimulator to control bowel movements"    [place] ESOPHAGOGASTRODUODENOSCOPY TRANSORAL DIAGNOSTIC N/A 9/27/2016    Procedure: ESOPHAGOGASTRODUODENOSCOPY (EGD);   Surgeon: Charanjit Dick MD;  Location: [facility]; Service: Gastroenterology    MD LAP,CHOLECYSTECTOMY N/A 2/29/2016    Procedure: LAPAROSCOPIC CHOLECYSTECTOMY ;  Surgeon: Stas Padilla DO;  Location: [facility];  Service: General    ROTATOR CUFF REPAIR Right     TOE AMPUTATION Right 10/28/2016    Procedure: 3RD TOE AMPUTATION ;  Surgeon: Lazara Rollins DPM;  Location: [facility];  Service:      Family History   Problem Relation Age of Onset    Leukemia Mother     Liver disease Mother     Lung cancer Mother         heavy smoker - 3 ppd    Heart disease Father     Liver disease Father     Multiple myeloma Sister     Breast cancer Sister     Urolithiasis Family     Alcohol abuse Neg Hx     Depression Neg Hx     Drug abuse Neg Hx     Substance Abuse Neg Hx      History   Smoking Status    Never Smoker   Smokeless Tobacco    Never Used     History   Alcohol Use No      History   Drug Use No       Current Outpatient Prescriptions   Medication Sig Dispense Refill    amLODIPine (NORVASC) 5 mg tablet Take 1 tablet (5 mg total) by mouth every 12 (twelve) hours 60 tablet 0    aspirin (ECOTRIN LOW STRENGTH) 81 mg EC tablet Take 81 mg by mouth daily Resume on 8/14      atorvastatin (LIPITOR) 80 mg tablet Take 1 tablet (80 mg total) by mouth daily with dinner 30 tablet 0    Blood Glucose Monitoring Suppl (ONE TOUCH ULTRA 2) w/Device KIT by Does not apply route 3 (three) times a day 1 each 0    Blood Pressure Monitoring (BLOOD PRESSURE CUFF) MISC Use to check blood pressure before taking blood pressure medication and 1 hour after and follow instructions provided in discharge instructions based on the readings   1 each 0    carvedilol (COREG) 6 25 mg tablet Take 1 tablet (6 25 mg total) by mouth 2 (two) times a day with meals 60 tablet 0    famotidine (PEPCID) 20 mg tablet Take 20 mg by mouth daily Resume on 8/14      gabapentin (NEURONTIN) 600 MG tablet Take 600 mg by mouth daily Resume on 8/14      insulin aspart protamine-insulin aspart (NovoLOG 70/30) 100 units/mL injection Inject 35 Units under the skin 2 (two) times a day before meals Resume on       neomycin-bacitracin-polymyxin b (NEOSPORIN) ointment Apply 1 application topically 2 (two) times a day Apply twice per day to shin wound until fully healed  If not fully healed in 5 days contact your family doctor  Next application is the evening of       glucose 4 g chewable tablet Chew 3 tablets (12 g total) as needed for low blood sugar 50 tablet 0     No current facility-administered medications for this visit  Allergies   Allergen Reactions    Penicillins Other (See Comments) and Hives     Rash:jose miguel augmentin in past  Rash:jose miguel augmentin in past     Immunization History   Administered Date(s) Administered    Tdap 10/24/2016       Patient Care Team:  Laura Leal DO as PCP - General  Cecille David MD    Medicare Screening Tests and Risk Assessments:      Emotional/Mental Health:    PHQ-9 Depression Screening:    Frequency of the following problems over the past two weeks:      3  Trouble falling or staying asleep, or sleeping too much: 0 - not at all      4  Feeling tired or having little energy: 2 - more than half the days      5  Poor appetite or overeatin - not at all      6  Feeling bad about yourself - or that you are a failure or have let yourself or your family down: 0 - not at all      7  Trouble concentrating on things, such as reading the newspaper or watching television: 0 - not at all      8  Moving or speaking so slowly that other people could have noticed  Or the opposite - being so fidgety or restless that you have been moving around a lot more than usual: 0 - not at all      9  Thoughts that you would be better off dead, or of hurting yourself in some way: 0 - not at all      Lifestyle Choices:         Tobacco use quit date:         Alcohol use per week: 1x/3 months

## 2018-08-15 NOTE — Clinical Note
Bright nails  Pt's wife is interested in day programs for her  so she can return to work    Can you give her a call, thx

## 2018-08-15 NOTE — PROGRESS NOTES
Date and time hospital follow up call was made:  8/14/2018  8:37 AM  Patient was hopsitalized at:  [facility]  Date of admission:  8/2/18  Date of discharge:  8/13/18  Diagnosis:  Ischemic cerebrovascular accident  Disposition:  Home  Were the patients medicaitons reviewed and updated:  Yes  Current symptoms:  None  Post hospital issues:  None  Scheduled for follow up?:  Yes  Patients specialists:  Neurologist  I have advised the patient to call PCP with any new or worsening symptoms (please type in name along with any credentials):  Elodia Holstein- 8/14/2018  Living Arrangements:  Spouse or Significiant other

## 2018-08-15 NOTE — OCCUPATIONAL THERAPY NOTE
Occupational Therapy Discharge Summary    Pt d/c home with recommendations for family to provide 24/7 S and A for IADL fxn at overall S/min A level  Pt continued to present with deficits in balance, safety, problem solving, memory, attention/comprehension, and R UE coordination  Pt participated in formalized cognitive testing of ACLS and scored an overall 4 2/6 0 indicating need for 24/7 S and A for IADL fxn, see below for details  Family participated in multiple education and training sessions to inc safety and carryover of d/c environment  Recommending shower chair for bathing  Pt to continue with OP OT services to maximize indep and safety with ADL/IADL fxn          4 2    Administered Quang Gentleman Cognitive Level Screen (ACLS)  Pt scored 4 2/6 0 indicating 24 Hour supervision is recommended to remove dangerous objects outside the visual field and to solve problems due to minor changes in the environment  Behavior:  Recognizes errors  Identifies problems  Asks for Day/Date  Sequences one activity at a time  Processing speed is slow and may take extra time to complete tasks  Memorization will be very slow  Requires long term repetitive training for new tasks  Keep directions short and concise  Grooming:  Initiates and completes with supplies from familiar accessible locations  Stops and asks for help when an error is made  Expect cuts with use of straight razor  Check every few minutes if left alone  Allow ample time for completion of tasks  Dressing:  Selects clothing items based on striking features like color  May choose to wear a favorite item all the time  May argue with suggestions to change selections  Bathing:  Recognizes need for a bath and may ask if time is appropriate  Collects supplies from a visible location  Follows routine  May miss small hidden areas  Recognizes problem like lack of soap and asks for help    Remove hazards from proximity to bath (ie: electrical appliances)  Walking/exercising:  Ambulates to a familiar location ½ to 1 mile away  May not vary route  May fail to attend to activity or noises outside visual field  May ask for help if lost   May be able to learn a graded exercise program   May become anxious in high stimulus environments (malls, airports, casinos)  May resist going to certain places  Eating:  Initiates coming to table at routine times  Uses utensils  Recognizes errors and asks for help  Attempts to comply with social standards  May have trouble waiting for others  Assist with handling hot foods/liquids  Monitor compliance with special diet  Toileting:  Recognizes difficulties that interfere with toileting routine and asks for help  May be able to report change in bowel or bladder habits  May take much longer than average to complete toileting  Medications:  Initiates taking familiar dose at regular time of day  May not be able to open container and may have incorrect ideas of effects that lead to noncompliance  May not seek assist for problems encountered  Supervise to ensure compliance  Use of adaptive equipment:  Needs help with more complex equipment  May need assistance with fasteners that require fine motor skills  Does not understand the potential hazards of incorrect use  May forget to use equipment  Housekeeping:   Initiates and completes a routine of housekeeping activities  May be able to set priorities but may become fixated on a priority  Cleans, polishes and sweeps one feature at a time  Check for quality of cleaning results  Food preparation:  Does not plan for long term food needs  May go to store repeatedly whenever food is desired  May search cabinet for particular food item and ask for help when items are not found  May prepare a simple meal but may not recognize problems  Store all undesirable equipment away from view  Do not leave alone when using dangerous tools/equipment    Spending money: May be able to set a priority for an expense that is highly valued  May become fixated on item  May resist help  Shopping:  May go to familiar shops but does not make a list or compare prices  Looks in familiar locations for an item  May ask for help when not found  May insist on purchasing item that is unrealistic or impractical   May resist help  May be anxious in high stimulus environments  Laundry:  May sort laundry by color or other simple striking cues  May view as a priority and initiate regularly  Recognizes errors like too much soap but cannot offer solutions  Traveling:  May go through actions slowly  Can sit unaccompanied up to 1 hour on a bus or train with landmarks as a guide when to get off  Asks for assistance if lost, does not alter familiar routes  May become anxious in high stimulus environments (bus, airport terminals)  Telephone:  Dials a new number written down by checking it 1 digit at a time  Writes down information very slowly  Does not consider a persons schedule or time of day when calling  Needs assistance to make calls from unfamiliar telephones  May call emergency or familiar numbers excessively  Driving:  Should NOT operate a motor vehicle

## 2018-08-15 NOTE — PATIENT INSTRUCTIONS
1  Increase insulin to 40 units in morning and 38 units in evening  2  Continue checking blood sugars  3  Reduce gabapentin to once a day  4  Call me with blood sugar readings in 2 weeks  5    referral to Kuldip Sheffield

## 2018-08-15 NOTE — PROGRESS NOTES
Date and time hospital follow up call was made:  8/14/2018  8:37 AM  Patient was hopsitalized at:  [facility]  Date of admission:  8/2/18  Date of discharge:  8/13/18  Diagnosis:  Ischemic cerebrovascular accident  Disposition:  Home  Were the patients medicaitons reviewed and updated:  Yes  Current symptoms:  None  Post hospital issues:  None  Scheduled for follow up?:  Yes  Patients specialists:  Neurologist  I have advised the patient to call PCP with any new or worsening symptoms (please type in name along with any credentials):  Gatito Ludwig- 8/14/2018  Living Arrangements:  Spouse or Significiant other         Assessment/Plan:     Diagnoses and all orders for this visit:    Hospital discharge follow-up    Type 2 diabetes mellitus with stage 3 chronic kidney disease, with long-term current use of insulin ([facility] )  Comments:  increase insulin dose to 40 U in AM and 38 U in PM   continue checking BS & call me with readings in 1 week  Orders:  -     glucose 4 g chewable tablet; Chew 3 tablets (12 g total) as needed for low blood sugar    History of ischemic cerebrovascular accident (CVA) with residual deficit  Comments:  s/p tPA with good amt of recovery from initial symtpoms  continue PT    Hepatic cirrhosis, unspecified hepatic cirrhosis type, unspecified whether ascites present [facility])  Comments:  advised to go see GI, never went   will need US Q6 mos, will d/w Suzanna Nivees and f/u OV    Diabetic polyneuropathy associated with type 2 diabetes mellitus ([facility] )  Comments:  taking gabapentin BID, reduce dose to 1x per day 2nd to CKD    Benign hypertension with CKD (chronic kidney disease) stage III  Comments:  BP doing better, c/w CCB/BB and f/u renal    Other orders  -     diphenoxylate-atropine (LOMOTIL) 2 5-0 025 mg per tablet;  Take 1 tablet by mouth 4 (four) times a day as needed for diarrhea  -     insulin aspart protamine-insulin aspart (NovoLOG 70/30) 100 units/mL injection; Administer 40 U in AM and 38 U in evening or as directed          Subjective:      Patient ID: Becky Jason is a 62 y o  male  HPI    Here for hospital and rehab discharge f/u, here with wife during appt who provides add'l history  Had sudden onset of left sided weakness and droop  Went to hospital, found to have acute CVA s/p tPA and sx dramatically improved  Discharged to acute rehab and had PT/OT and now at home  Using walker to ambulate with and wife reports most of his memory loss has come back except for 1/4th  Pt has been stressed at home but denies depression, has left eyelid ptosis but vision is fine  BS doing better(A1C in hospital was 7 5%)  AM BS readings 150s-160s and dinner time readings in 170s  No low BS readings and no skipping meals  We discussed fixed dose 70/30 insulin and option to switch to a self-mixed insulin of regular and NPH to avoid fluctuations in blood sugar/hypoglycemia and pt/wife declined  Has appt with neuro in few weeks  PHQ-9 score of 1-2, no SI  No other complaints      Past Medical History:   Diagnosis Date    Diabetes mellitus ([facility] )     Hypercholesteremia     Hyperlipidemia     Hypertension     Neuropathy     Obesity     [facility] ([facility] )     last assessed 11/4/16    PVC's (premature ventricular contractions)     sees cardiology Dr Terell camargo     Vitals:    08/15/18 1210   BP: 140/74   Pulse: 71   Resp: 18   Temp: (!) 97 °F (36 1 °C)   TempSrc: Oral   SpO2: 98%   Weight: 107 kg (236 lb 9 6 oz)   Height: 5' 8" (1 727 m)     Body mass index is 35 97 kg/m²      Current Outpatient Prescriptions:     amLODIPine (NORVASC) 5 mg tablet, Take 1 tablet (5 mg total) by mouth every 12 (twelve) hours, Disp: 60 tablet, Rfl: 0    aspirin (ECOTRIN LOW STRENGTH) 81 mg EC tablet, Take 81 mg by mouth daily Resume on 8/14, Disp: , Rfl:     atorvastatin (LIPITOR) 80 mg tablet, Take 1 tablet (80 mg total) by mouth daily with dinner, Disp: 30 tablet, Rfl: 0    Blood Glucose Monitoring Suppl (ONE TOUCH ULTRA 2) w/Device KIT, by Does not apply route 3 (three) times a day, Disp: 1 each, Rfl: 0    Blood Pressure Monitoring (BLOOD PRESSURE CUFF) MISC, Use to check blood pressure before taking blood pressure medication and 1 hour after and follow instructions provided in discharge instructions based on the readings  , Disp: 1 each, Rfl: 0    carvedilol (COREG) 6 25 mg tablet, Take 1 tablet (6 25 mg total) by mouth 2 (two) times a day with meals, Disp: 60 tablet, Rfl: 0    diphenoxylate-atropine (LOMOTIL) 2 5-0 025 mg per tablet, Take 1 tablet by mouth 4 (four) times a day as needed for diarrhea, Disp: , Rfl:     famotidine (PEPCID) 20 mg tablet, Take 20 mg by mouth daily Resume on 8/14, Disp: , Rfl:     gabapentin (NEURONTIN) 600 MG tablet, Take 600 mg by mouth daily Resume on 8/14, Disp: , Rfl:     insulin aspart protamine-insulin aspart (NovoLOG 70/30) 100 units/mL injection, Administer 40 U in AM and 38 U in evening or as directed, Disp: 10 mL, Rfl: 0    neomycin-bacitracin-polymyxin b (NEOSPORIN) ointment, Apply 1 application topically 2 (two) times a day Apply twice per day to shin wound until fully healed  If not fully healed in 5 days contact your family doctor  Next application is the evening of 8/13, Disp: , Rfl:     glucose 4 g chewable tablet, Chew 3 tablets (12 g total) as needed for low blood sugar, Disp: 50 tablet, Rfl: 0  Allergies   Allergen Reactions    Penicillins Other (See Comments) and Hives     Rash:jose miguel augmentin in past  Rash:jose miguel augmentin in past       Review of Systems   Constitutional: Negative for fever  HENT: Negative for congestion  Eyes: Negative for visual disturbance  Respiratory: Negative for shortness of breath  Cardiovascular: Negative for chest pain  Gastrointestinal: Negative for abdominal pain  Genitourinary: Negative for difficulty urinating  Musculoskeletal: Positive for gait problem  Negative for arthralgias  Neurological: Positive for weakness  Negative for headaches  Psychiatric/Behavioral: Positive for confusion (mild, still present per wife)  Negative for dysphoric mood  Objective:      /74   Pulse 71   Temp (!) 97 °F (36 1 °C) (Oral)   Resp 18   Ht 5' 8" (1 727 m)   Wt 107 kg (236 lb 9 6 oz)   SpO2 98%   BMI 35 97 kg/m²          Physical Exam   Constitutional: He appears well-developed and well-nourished  HENT:   Head: Normocephalic and atraumatic  Mouth/Throat: Oropharynx is clear and moist    Eyes: Conjunctivae are normal  Pupils are equal, round, and reactive to light  Left eyelid ptosis(s/p stroke)   Cardiovascular: Normal rate, regular rhythm and normal heart sounds  No murmur heard  Pulmonary/Chest: Effort normal and breath sounds normal  He has no wheezes  He has no rales  Abdominal: Soft  Bowel sounds are normal  There is no tenderness  Musculoskeletal: He exhibits no edema  Neurological: He is alert  Gait (using walker) abnormal    Motor 5/5 UE & LE b/l except left distal arm/forearm 4-/5   Psychiatric: He has a normal mood and affect  His behavior is normal    Vitals reviewed          PHQ-9 Depression Screening    PHQ-9:    Frequency of the following problems over the past two weeks:       Little interest or pleasure in doing things:  0 - not at all  Feeling down, depressed, or hopeless:  0 - not at all  PHQ-2 Score:  0

## 2018-08-16 ENCOUNTER — EVALUATION (OUTPATIENT)
Dept: SPEECH THERAPY | Facility: CLINIC | Age: 58
End: 2018-08-16
Payer: COMMERCIAL

## 2018-08-16 ENCOUNTER — PATIENT OUTREACH (OUTPATIENT)
Dept: INTERNAL MEDICINE CLINIC | Facility: CLINIC | Age: 58
End: 2018-08-16

## 2018-08-16 DIAGNOSIS — R48.8 OTHER SYMBOLIC DYSFUNCTIONS: Primary | ICD-10-CM

## 2018-08-16 DIAGNOSIS — I10 ESSENTIAL HYPERTENSION: Chronic | ICD-10-CM

## 2018-08-16 DIAGNOSIS — I63.9 ISCHEMIC CEREBROVASCULAR ACCIDENT (CVA) (HCC): ICD-10-CM

## 2018-08-16 DIAGNOSIS — I69.322 DYSARTHRIA AS LATE EFFECT OF CEREBELLAR CEREBROVASCULAR ACCIDENT (CVA): ICD-10-CM

## 2018-08-16 PROCEDURE — G9163 LANG EXPRESS GOAL STATUS: HCPCS

## 2018-08-16 PROCEDURE — 96125 COGNITIVE TEST BY HC PRO: CPT

## 2018-08-16 PROCEDURE — G9162 LANG EXPRESS CURRENT STATUS: HCPCS

## 2018-08-16 RX ORDER — CARVEDILOL 6.25 MG/1
6.25 TABLET ORAL 2 TIMES DAILY WITH MEALS
Qty: 60 TABLET | Refills: 3 | Status: SHIPPED | OUTPATIENT
Start: 2018-08-16 | End: 2018-10-01 | Stop reason: SDUPTHER

## 2018-08-16 NOTE — PROGRESS NOTES
Speech-Language Pathology Initial Evaluation    Today's date: 2018  Patients name: Katie Sunshine  : 1960  MRN: [id number]  Safety measures: CVA  Referring provider: Beba Craven MD    Subjective comments: "do you think you can help me?"  Pt with wife attending during initial evaluation  Noted pt to have mild right facial droop, but per wife has drastically improved  Pt with left eye partially closed during session 2/2 facial droop/weakness  Pt became visually upset while discussing changes post CVA  Per wife, pt becomes upset frequently  Able to redirect easily and continue with testing  Patient's goal(s): "to talk to my wife again "     Reason for referral: Change in cognitive status, Change in vocal quality, Decreased language skills and Decreased speech intelligibility  Prior functional status: Communication effective and appropriate in all situations  Clinically complex situations: Discharge from SNF or Hospital in the last 30 days    History: Patient is a 62year-old male who presents for a cognitive-linguistic evaluation secondary to CVA on 2018  Patient presented to [facility] right sided weakness, right facial droop, and slurred speech  Patient was found to have acute left basal ganglia CVA  CTA with severe focal stenosis of the distal right petrous/cavernous ICA junction, moderate focal right supraclinoid ICA stenosis, mild ostial stenosis of the bilateral vertebral arteries, moderate focal intracranial left vertebral stenosis and mild left atherosclerotic calcification of the right carotid bifurcation  Patient was given TPA and responded well  Patient was discharged to [facility] on 2018 where he received ST, OT, and PT  Patient was discharged from [facility] on 2018  On this date, patient's primary concerns are his language and his memory  He reports that he has trouble getting his words out and communicating with other people   Per wife, she has trouble understanding him, rating him at about 50% unintelligible during conversational speech  Wife reported that pt sometimes mixes up his 4 children's names, has difficulty remembering his phone number, and forgets new people's names  Per pt and wife, both believe that pt's vocal quality has changed  Currently, voice is a low volume, hard to understand at times, with a harsh quality  Per pt, no swallowing difficulty at this time  Positive PHQ-9 score: 6  Pt was educated on importance and helpfulness of receiving counseling/psychological services, however pt denied  Hearing: WFL  Vision: Severe L facial droop, creating L eye to be partially closed  Per wife, eye doctor says there is no issues with his vision at this time  Pt arrived wearing eye glasses, but took them off for testing despite wife and clinician reminding pt to wear them; pt denied  Home environment/lifestyle: lives with wife and four children  Prior to CVA, pt responsible for paying all bills and taking own medications  However, per wife and pt, both believe that because he forgot to take his medications prior to CVA, this is what caused the stroke  Now, wife in charge of all medications and bills  Pt would like to return back to being responsible for those tasks  Pt doesn't cook  Highest level of education: high school   Vocational status:  previously before stroke, wants to get back to working eventually  Mental status: Alert  Behavior status: Cooperative  Communication modalities: Verbal  Rehabilitation prognosis: Good rehab potential to reach the established goals    Assessments    The Cognitive Linguistic Quick Test (CLQT) is designed to measure an individuals five cognitive domains (attention, memory, executive functions, language, and visuospatial skills)   This norm-referenced tool has been standardized on adults ages 25 through 80years old with neurological impairment as a result of CVA, TBI, or dementia  The following results were obtained during the administration of the assessment  Cognitive Domain: Score: Range of Severity:   Attention 34/215 Severe   Memory 121/185 Moderate   Executive Functions 10/40 Severe   Language  23/37 Moderate   Visuospatial Skills  35/105 Severe        *Composite Severity Ratin 4/4 0  Severe             Clock Drawin/ Moderate     Pt scored below Criteron Cut Scores on the following subtests: Symbol Cancellation, Clock Drawing, Story Retelling, Symbol Trails, Generative Naming, Mazes and Design Generation  *Patient named 9 concrete category members (animals) in 60 sec (norm=15+)  -- BELOW AVERAGE    *Patient named 4 abstract category members (words beginning with letter 'm') in 60 sec (norm=10+)  -- BELOW AVERAGE perseverated x2     Informal Motor Speech Assessment   # of times /pa/ is produced in 5 sec: 9   # of times /ta/ is produced in 5 sec: 16 (however, slurred at times)   # of time /ka/ is produced in 5 sec: 8   # of times /pataka/ is produced in 5 sec: 5      Noted pt to have decreased speed with all repetitions  Pt with decreased speed but good rhythm and evenness with /pa/ and /ka/  /ta/ was perceived to be more difficult for pt c/b decreased articulation and slurred speech  Overall /pataka/ phrase was with reduced fluency and decreased articulation when changing tongue position in mouth from alveolar to backing  Goals  Short-term goals:  1  Pt to complete full oral-mechanism examination to provide further baseline data for dysarthria goals  2  Patient will be educated on oral motor exercises and provided an appropriate at home exercise program to be practiced to facilitate improved strength and function for increased speech intelligibility  To be achieved in 4-6 weeks  3  Patient will be educated on word finding strategies (i e , circumlocution) for improved generative naming and verbal expression skills    4  Patient will name an average of 15+ items in a category in 60 seconds over 5 trials using compensatory strategies and min cues to facilitate improved word retrieval skills, to be achieved in 4-6 weeks  5  Patient will name an average of 10+ words that begin with a specific letter in 60 seconds over 5 trials using compensatory strategies and min cues to facilitate improved word retrieval skills, to be achieved in 4-6 weeks  6  Patient will complete concrete and abstract categorization tasks to 80% accuracy to facilitate improved generative naming skills and working memory, to be achieved in 4-6 weeks  7  Patient will complete deductive reasoning puzzles with 80% accuracy to facilitate increased working memory, problem solving, and processing skills, to be achieved in 4-6 weeks  8  Patient will complete thought organization tasks (e g , sequencing, deduction puzzles, etc ) with 80% accuracy to facilitate increased executive functioning skills, to be achieved in 4-6 weeks  Long-term goals:  1  Patient will develop functional, cognitive-linguistic based skills and utilize compensatory strategies as needed to communicate effectively to different conversational partners, maintain safety, and participate socially by discharge  2  Patient will demonstrate improved expressive and receptive language skills during structured and unstructured tasks by discharge  3  Patient will independently utilize speech intelligibility strategies (e g , over-exaggeration, slow rate, breath groups, etc ) during oral reading tasks >90% intelligibility to facilitate increased generalization of skills into conversational speech by discharge       Functional Limitations Reporting (G-codes):   Flowsheet Rows      Most Recent Value   SLP G-Codes   FOTO information reviewed  N/A   Assessment Type  Evaluation   Functional Limitations  Spoken language expressive   Spoken Language Expression Current Status ()  CK   Spoken Language Expression Goal Status () CI            Impressions/Recommendations    Impressions: Patient presents with moderate to severe cognitive linguistic deficits s/p CVA  Pt with decreased word finding ability, short term memory, processing speed, attention, executive functioning, visuospatial skills, and reduced fluent language as observed through standardized testing  Pt also presents as dysarthric c/b L facial droop, slurred speech during conversation, and misarticulation at times  Planning to target through education and by also providing a home exercise plan, as we plan to focus more on language in therapy  Pt also with reduced vocal quality, presenting with low volume and hoarseness during speech  This will be informally targeted during therapy, as language is primary concern  Pt appears to be motivated to begin therapy  Pt will be receiving ST, PT, and OT at this location  ST will be targeting expressive/receptive language through education of word finding strategies (i e  Circumlocution) to improve pt's verbal fluency, as well as targeting thought organization  OT and PT goals are TBD following initial evaluations  Recommendations:  -Patient would benefit from outpatient skilled Speech Therapy services : Speech/ language therapy and Cognitive-Linguistic therapy     -Frequency: 2x weekly  -Duration: 4-6 weeks    -Intervention certification from: 5/23/4893  -Intervention certification to: 3/00/889     -Intervention comments: Initial evaluation/administration of standardized test with patient (T3:00-4:00pm)   Scoring and interpretation of standardized test for the development of POC (5:30-6:30pm)      Visit Tracking:  -Referring provider: EPIC   -Billing guidelines: AMA  -Visit #1/24   -Genaro Hendricks  [facility]  ([place]-no auth;[place]    [facility]-auth post 24 visits)   -RE due 9/27/2018

## 2018-08-17 ENCOUNTER — TELEPHONE (OUTPATIENT)
Dept: INTERNAL MEDICINE CLINIC | Facility: CLINIC | Age: 58
End: 2018-08-17

## 2018-08-17 ENCOUNTER — EVALUATION (OUTPATIENT)
Dept: PHYSICAL THERAPY | Facility: CLINIC | Age: 58
End: 2018-08-17
Payer: COMMERCIAL

## 2018-08-17 ENCOUNTER — EVALUATION (OUTPATIENT)
Dept: OCCUPATIONAL THERAPY | Facility: CLINIC | Age: 58
End: 2018-08-17
Payer: COMMERCIAL

## 2018-08-17 DIAGNOSIS — K74.60 HEPATIC CIRRHOSIS, UNSPECIFIED HEPATIC CIRRHOSIS TYPE, UNSPECIFIED WHETHER ASCITES PRESENT (HCC): ICD-10-CM

## 2018-08-17 DIAGNOSIS — K59.09 OTHER CONSTIPATION: Primary | ICD-10-CM

## 2018-08-17 DIAGNOSIS — I69.30 HISTORY OF ISCHEMIC CEREBROVASCULAR ACCIDENT (CVA) WITH RESIDUAL DEFICIT: Primary | ICD-10-CM

## 2018-08-17 DIAGNOSIS — I63.9 CEREBROVASCULAR ACCIDENT (CVA), UNSPECIFIED MECHANISM (HCC): Primary | ICD-10-CM

## 2018-08-17 DIAGNOSIS — Z74.09 IMPAIRED FUNCTIONAL MOBILITY, BALANCE, AND ENDURANCE: ICD-10-CM

## 2018-08-17 PROCEDURE — G8984 CARRY CURRENT STATUS: HCPCS

## 2018-08-17 PROCEDURE — 97167 OT EVAL HIGH COMPLEX 60 MIN: CPT

## 2018-08-17 PROCEDURE — G8978 MOBILITY CURRENT STATUS: HCPCS | Performed by: PHYSICAL THERAPIST

## 2018-08-17 PROCEDURE — G8985 CARRY GOAL STATUS: HCPCS

## 2018-08-17 PROCEDURE — 97162 PT EVAL MOD COMPLEX 30 MIN: CPT | Performed by: PHYSICAL THERAPIST

## 2018-08-17 PROCEDURE — G8979 MOBILITY GOAL STATUS: HCPCS | Performed by: PHYSICAL THERAPIST

## 2018-08-17 RX ORDER — LACTULOSE 20 G/30ML
20 SOLUTION ORAL DAILY PRN
Qty: 473 ML | Refills: 0 | Status: SHIPPED | OUTPATIENT
Start: 2018-08-17 | End: 2019-08-23

## 2018-08-17 NOTE — PROGRESS NOTES
Occupational Therapy Stroke Evaluation:    Today's Date: 2018  Patient Name: Mica Marin  : 1960  MRN: [id number]  Referring Provider: Ngozi Castellon MD  Dx: History of ischemic cerebrovascular accident (CVA) with residual deficit [I69 30]    Active Problem List:   Patient Active Problem List   Diagnosis    Type 2 diabetes mellitus with kidney complication, with long-term current use of insulin (HCC)    Hyperlipidemia    Benign hypertension with CKD (chronic kidney disease) stage III    History of ischemic cerebrovascular accident (CVA) with residual deficit    Cognitive impairment    Elevated alkaline phosphatase level    CKD (chronic kidney disease)    Diabetic macular edema (HCC)    GERD (gastroesophageal reflux disease)    Cirrhosis ([facility] [address] [address] )    Diabetic polyneuropathy associated with type 2 diabetes mellitus (HCC)    Other constipation     Past Medical Hx:   Past Medical History:   Diagnosis Date    [facility]     Hypercholesteremia     Hyperlipidemia     Hypertension     Neuropathy     Obesity     Osteomyelitis ([facility] )     last assessed 16    PVC's (premature ventricular contractions)     sees cardiology Dr Víctor camargo     Past Surgical Hx:   Past Surgical History:   Procedure Laterality Date    ABDOMINAL SURGERY      CHOLECYSTECTOMY      Percutaneous    OTHER SURGICAL HISTORY      "stimulator to control bowel movements"    ID ESOPHAGOGASTRODUODENOSCOPY TRANSORAL DIAGNOSTIC N/A 2016    Procedure: ESOPHAGOGASTRODUODENOSCOPY (EGD); Surgeon: Sarah Smith MD;  [facility];   Service: Gastroenterology    ID LAP,CHOLECYSTECTOMY N/A 2016    Procedure: LAPAROSCOPIC CHOLECYSTECTOMY ;  Surgeon: Manuela Jacobo DO;  [facility];  Service: General    ROTATOR CUFF REPAIR Right     TOE AMPUTATION Right 10/28/2016    Procedure: 3RD TOE AMPUTATION ;  Surgeon: Juan Izquierdo DPM;  [facility];  Service:       Pain Levels: Restin    With Activity:  0    Subjective/Patient Goal: "My eye not hurting me"    History of Present Illness:  Pt is a pleasant, active, employed full time 62 y o  male seen for OT angeloal s/p referred to [address] [place] [address] s/p adm to [facility] 2018-2018 s/p R facial droop, stroke alert called, administered TPA in ED, managed in ICU, no ICH noted, MRIB + Recent ischemia, without hemorrhage in the left posterior perforating substance affecting hypothalamus, anterolateral thalamus, and genu of the left internal capsule, f/u CTB: Expected evolution of the known ischemic infarct in the left basal ganglion since prior CT  No hemorrhagic conversion  No new ischemic infarcts are seen  No new intraparenchymal hemorrhages are seen  No hydrocephalus, adm to [facility] 2018-2018, now referred for outpt OT/PT, dx'd w/ L BG CVA, CKD, diabetic macular edema, elevated alkaline phosphatase level, cognitive impairment, neuropathy, HTN, HLD, comorbidities as listed above  Lifestyle Performance Model:  Autonomy: Pt was I w/  I/ADLS, drove, & required no use of DME PTA, since CVA was min A for ADLS, A for IADLS, has not returned to driving and required use of RW,   Reciprocal Relationships: Supportive wife and 4 children living 16, 22, 32, and 30  Service to Others: Pt is retired , also worked for [employer]  Intrinsic Gratification: Enjoys nothing outside of working going out to eat, going shopping, Jain on     Home Setup: Pt lives in a 1 story handicapped accessible apartment w/ first floor setup w/ 0 RAFAT in [place] off [facility]    Objective  Impairments Section:   UE Strength:   MARIFER: RUE: 40/200 LUE: 45/200   PINCER: 3 point pinch: RUE 8, LUE: 5   2 point pinch: RUE: 6, LUE: 5   Lateral pincher: RUE: 9, LUE: 9    Coordination:   9 HOLE PEG TEST:     RUE: 1 minute 1 seconds, LUE: 53 1 Seconds    Range of Motion:  AROM/PROM: R handed, macrographia, RUE hemiparesis w/ pronator drift w/ hypotonicity proximal>distal, apraxia, ataxia, dysmetria, dysdiadochokinesia, impaired FMC/FMS/GMC/GMS  MMT: LUE 5/5, RUE 4-/5 proximal>distal    Sensation:  MYOFILAMENTS:   RUE: 4 31 impaired tactile/proprioceptive kinesthetic sensory input   LUE: 4 31    Visual Perceptual and Functional Cognition:  1  Convergence Insufficiency Symptom Survey (CISS):  PASS    2  Cognitive Checklist:  *Patient indicated that he is experiencing the following symptoms:    · Memory: Remembering people's names, Remembering your schedule, Remembering previous learing, Sequencing activities, Learning new things and Driving directions    · Attention: Keeping attention during a conversation, Focusing or concentrating on a specific task, Sustaining attention on a task and Dividing your attention (i e , multi-tasking)    · Processing: Processing new information  and Responding to questions in a timely manner    · Executive Functions: Monitoring your own ideas, behaviors, and/or emotions, Organizing/ planning written work, emails, and/or daily tasks, Self-correcting or recognizing mistakes, Initiating tasks and Setting goals    · Communication: Word finding in conversation, Expressing thoughts and ideas fluently, Expressing thoughts and ideas into writing, Understanding others' non-verbal cues, Using non-verbal cues and Managing tone and volume of voice    · Visual: Losing spot on the page when reading, Misspelling while texting/email, Change in handwriting and New onset motion sickness in car    · Emotional: Personality changes, Monitoring mood, Increased anxiety, Frustration tolerance, Sleep changes and Keeping cognitive and physical pace    · Increased Sensitivities to: None reported     3   Víctor Cognitive Assessment Version 8 1 (MoCA V8 1)  Visuospatial/executive functionin/5  Namin/3  Memory: 1st trial:  , 2nd trial:    Attention/concentration:   List of letters: 0  Serial Seven Subtraction:  3/3 w/ 1 errors  Language/sentence repetition:  2/2  Language Fluency:  0/1  Abstract/Correlational Thinkin/2  Delayed Recall:  0/5  Orientation:  0/6   Memory Index Score: 315  MoCA V1 8 1 Raw Score:  , MIS:  7/15, indicative of moderate neurocognitive impairments  4  Vision Screening Recording Form:   vision screen: + glasses @ all times donned for vision screen  near acuity: R 20/40, L 20/40  binocularity far: R esophoria, L eye droop, L eye exophoric strabismus  binocularity near: R esophoria, L eye droop, L eye exophoric strabismus  red green fusion: PLRG @ 12"  near point of convergence: no diplopia/fusion  enrico string: misaligment  Pursuits: jerky in all planes  Saccades: inaccurate in all planes  ocular ROM: L eye exophoric strabismus at rest  visual perceptual midline shift: @ midline and @ horizon    Discussed w/ pt and wife need for vision therapy and neuro optometrist c/s, pt and wife interested in [facility] locations, will review Dr Catherine Ramirez referral next session  Wife reports son will take pt to Dr Polo Lopez office in [place] for [address]  And preferred [facility] location for subsequent treatment 2* proximity to [place]  Will defer all vision to vision therapy  5  Anxiety/Depression:  Pt engaged in the Neuro QOL Anxiety Short Form and Neuro QOL Depression Short Form in order to screen for anxiety and depressive s/s  Pt reported the following:  Anxiety- Short Form:   --used to measure anxious s/s  For scoring purposes, scores of 25+ indicate the threshold for treatment per neurology/SLIM  Score:8/40  Pt most often chose the response never when asked about feeling anxious s/s  Depression- Short Form:   --used to measure depressive s/s  For scoring purposes, scores of 25+ indicate the threshold for treatment per neurology/SLIM  Score:16/40  Pt most often chose the response never when asked about feeling depressive s/s      Assessment/Plan  Occupational Therapy Skilled Analysis Assessment and Plan of Care:  Pt requires overall mod I for ADLs/self care and mod I for fx'l mobility w/ RW  Pt is currently demonstrating the following occupational deficits: limited 2* diplopia w/ binocular vision, R esophoria, L eye droop, L eye exophoric strabismus at rest, PLRG @ 12", no diplopia/fusion for point of convergence, misalignment, jerky pursuits, inaccurate saccades, confused, disoriented, impulsive, distractible, poor problem solving and sequencing, poor safety insight judgment and awareness into deficits, poor attention/concentration to task, requiring max cues to redirect and additional time for delayed processing, decreased STM/immediate delayed recall, decreased working memory, memory retention, dysarthria, facial droop, decreased EF skills, follows simple one step verbal concrete commands w/ max cues for carryover, difficulty w/ pacing/planning, R handed, macrographia, RUE hemiparesis w/ pronator drift w/ hypotonicity proximal>distal, apraxia, ataxia, dysmetria, dysdiadochokinesia, impaired FMC/FMS/GMC/GMS, impaired tactile/proprioceptive kinesthetic sensory input  The following Occupational Performance Areas to address include: grooming, bathing/shower, toilet hygiene, dressing, medication management, socialization, health maintenance, functional mobility, community mobility, clothing management, cleaning, meal prep, money management, household maintenance, care of children, care of pets, job performance/volunteering and social participation  Based on the aforementioned OT evaluation, functional performance deficits, and assessments, pt has been identified as a high complexity evaluation  Pt to continue to benefit from outpatient skilled OT services to address the following goals 2x/wk to  w/in 4 weeks with special focus on self-care management, pt education, RUE coordination/strengthening as well as motor training to improve above defiicits and enhance overall QOL/function   Defer language to SLP, vision to vision therapy/neuro optometrist, OT to focus on gross cog/memory, attention/concentration, and RUE function      Goals:  Short Term Goals:  Modalities:  - Pt will tolerate BIONESS for improved motor and sensory performance for overall improved hand to target with 80% accuracy 4 weeks  Coordination:  - Pt will increase prehension patterns for improved tripod with utensil management with <20% droppage 4 weeks  - Pt will increase proprioception of  R hand to target for improved functional reach vision occluded with ADL tasks 4 weeks  - Pt will increase automaticity of RUE  to 75% for improved grasp release of tabletop items for improved functional performance with salient and fx'l I/ADL/leisure tasks 4 weeks  ROM/Function:  - Pt will increase R  UE to Gross Assist, refined functional assist, and stabilization with <20% cuing for tabletop tasks 4 weeks for improved functional performance of life roles and salient tasks 4 weeks  - Pt will demo with G carryover of Home Exercise Program to improve functional progression towards goals in Plan of care and for improved functional use of  RUE 4 weeks  Cognition:  - Pt will increase attention to 2+ tasks for improved role performance (once returned) and engagement in salient tasks 4 weeks as applicable  - Pt will increase insight into deficits for improved carryover of recommendations, accommodations, improved rate of healing 4 weeks  Long Term Goals:  Coordination:  - Decrease ataxia with functional reach for normalized movement pattern of  RUE  - Increase automaticity of  RUE to 100% for resumption of B integrative tasks  ROM/Function:  - Increase func mobs from various surfaces to Mod I/ I with least restrictive device and good safety t/o I/ADL/leisure tasks  - Resume hand dominance to refined mod I functional assist with all I/ADL/leisure/salient tasks (including driving if applicable)  - Pt will increase B/L UE strength to 5/5 and  strength, through the use of strengthening exercises and home program for eventual return to driving PRN  - Pt will increase [address] to Mod I with ADL fasteners for increased independence  Cognition:  - Pt will increase attention to 3+ tasks for improved divided attention with occupational roles and pre driving roles as applicable  - Pt will demo with decreased anxiety and frustration for improved insight into stroke process and rate of recovery    INTERVENTION COMMENTS:  Diagnosis: L BG CVA  Precautions: fall risk, impulsive, /VM impairments  FOTO: 77 with 23% limitation  Insurance: [employer]  1 of 24 visits, PN due 9/17/2018    Thank you for the consult!   Please call if you have any questions: [phone]  Gordan Meigs, OTD, OTR/L, C-GCM, CSRS  Director of Outpatient Neuro Occupational Therapy

## 2018-08-17 NOTE — TELEPHONE ENCOUNTER
Pt wife calling stating pt has been constipated x 1 week  Wants to know what she can give him   (Pt just had a stroke )    [facility]

## 2018-08-17 NOTE — PROGRESS NOTES
Returned call to son, Ricky Ly  States that he is interested in a adult day program for his father  Discussed [facility] in [facility] and he states they do not accept [employer]  Discussed [facility] and he states he did call, but they recently closed  Discussed [facility] in [place] and provided contact information  Discussed [facility], but states that his father needs more advanced care  Inquiring if any facilities would accept his father's insurance  Informed son that I would consult with Chi Diaz, , on our team and further inquire any additional resources available  Requesting Chi Diaz contact him directly to discuss  Provided Deepthi's contact information, but states would like for Chi Diaz to call him

## 2018-08-17 NOTE — TELEPHONE ENCOUNTER
Did he already stop taking lomotil?  If not, he should stop for now    & Needs to drink plenty of water & He should take fiber supplement(such as fiber gummies) twice a day and colace(stool softener) twice a day -> both OTC    Also prescribed lactulose to be taken once a day until bowels are moving regularly and then as needed after this    If does not improve with the above combined regimen, needs to let me know

## 2018-08-17 NOTE — PROGRESS NOTES
Spoke with Linette Crews, , and made aware of discussion with patient's son and that he would like for her to call him to further assist

## 2018-08-17 NOTE — TELEPHONE ENCOUNTER
Notified patients wife  States he is still constipated since Sunday  State he will stop taking lomotil  States will give us a call if does not improve

## 2018-08-17 NOTE — PROGRESS NOTES
PT Evaluation     Today's date: 2018  Patient name: Valdemar Vincent  : 1960  MRN: [id number]  Referring provider: Brandon Frost MD  Dx:   Encounter Diagnosis     ICD-10-CM    1  Cerebrovascular accident (CVA), unspecified mechanism ([facility] [address] ) I63 9                   Assessment    Assessment details: Patient is a 62year-old male who referred to OPPT s/p  L BG CVA  PmHx is significant for CKD, HTN, DM II, and spinal stimulator  Pt presents with minor strength deficits in R LE, impaired sensation possibly related to both CVA and DM, decreased endurance, and impaired balance  Pt is a high fall risk per BURGER, 5 x STS, and mCTSIB  Pt also demo poor safety awareness, requires close supervision with ambulation and transfers  Pt would benefit from skilled PT to address above impairments and maximize safety and functional mobility  Goals  STG - 4 weeks  1  BURGER > 37/56  2  TUG < 20 sec   3  5 x STS < 12 sec   4  Complete 6 MWT   5  Distant supervision during ambulation with RW and transfers    LTG - 8 weeks  1  BURGER > or = 45/56  2  Ambulate with no AD safely within household distances  3  SSV = or > 0 8 m/s  4  Maintain balance up to 30 seconds in first two conditions of MCTSIB (FTEO/EC firm and foam)    Plan  Frequency: 2x week  Duration in weeks: 8         Subjective Evaluation    History of Present Illness  Mechanism of injury: Patient is a 62year-old male who referred to OPPT s/p CVA on 2018  Patient presented to [facility] [address] with right sided weakness, right facial droop, and slurred speech  Patient was found to have acute left basal ganglia CVA  CTA with severe focal stenosis of the distal right petrous/cavernous ICA junction, moderate focal right supraclinoid ICA stenosis, mild ostial stenosis of the bilateral vertebral arteries, moderate focal intracranial left vertebral stenosis and mild left atherosclerotic calcification of the right carotid bifurcation   Patient was given TPA and responded well  Patient was [facility] to [facility] on 2018 where he received ST, OT, and PT  Patient was [facility] from [facility] on [facility]  Pt presents with diabetic shoes and currently using RW at home, does occasionally walk without AD, denies any recent falls  Pt wants to go outside on the porch but pt's wife won't let him  Balance is a little off during ambulation per pt's wife  Pt's goals is to be healthy and return to PLOF  Pt has spinal stimulator to control bowel movements       PLOF: no AD, walk 1 hr/day, worked as a   Pain  Current pain ratin    Social Support  Steps to enter house: yes (3 steps, bilateral HR)  L[facility] in: [facility]  Lives with: spouse and adult children (wife returns to work on 18, day care afterwards)    Employment status: not working ()        Objective  PT/OT Neuro Exam  Neurologic Exam    Vitals: reported dizziness and /78, HR 74; /70 at end of session    Balance Test    6 Minute Walk Test (ft):    2 Minute Walk Test (ft): 200 ft, unsafe turns   Gait Speed (ft/s): 0 65 m/s w/ RW    5x Sit To Stand (s): 17 sec, no UE   TU sec w/ RW   BURGER/56      MCTSIB Number of Seconds   Feet Together, Eyes Open 17   Feet Together, Eyes Closed 6 8   Feet Together, Eyes Open Foam NT   Feet Together, Eyes Closed Foam NT           Coordination Left Right   Heel To Shin Normal Normal    Finger To Nose     Rapid Alternating Movement     UE     LE         Sensation Left Right   Kinesthesia normal impaired   Light Touch normal normal   Sharp/Dull     2 Point Discrimination         Muscle Tone Left Right   Modified Satya Scale WNL WNL   Hamstring     Gastroc     Quad         Manual Muscle Testing - Hip Left Right   Flexion 5 4   Extension 5 4   Abduction 5 4   External Rotation       Manual Muscle Testing - Knee Left Right   Flexion 5 5   Extension 5 5     Manual Muscle Testing - Ankle Left Right   Doriflexion 5 5 Plantarflexion 5 5        Transfers    Sit To Stand Independent   W/C To Bed Independent   Sit To Supine Independent   Roll NP         Gait Assessment: pt ambulates with RW, demo forward flexed posture with RW getting away from hip, ataxic gait (occassionally LE hits side of RW), significant instability with turns, demo wide turns, decreased hip flexion bilaterally    Overall demo poor safety awareness, pt would stop using RW and ambulate to chair without AD with moderate instability    Precautions: FALL risk, DM II, HTN, spinal stimulator (no stim), CDK, poor safety awareness, *Fear of Dogs*    Daily Treatment Diary     Manual                                                                                   Exercise Diary              Recumbent bike             STS             FAEO, firm             Gait training with RW             Semi tandem              Squats             Lunges                                                                                                                                                                                          Modalities

## 2018-08-19 NOTE — PROGRESS NOTES
Occupational Therapy Daily Note:    Today's date: 2018  Patient name: Rick Cifuentes  : 1960  MRN: [id number]  Referring provider: Catrachito Jade MD  Dx:   Encounter Diagnosis   Name Primary?  History of ischemic cerebrovascular accident (CVA) with residual deficit Yes     Subjective: "I feel good today"  Objective: See treatment diary below  Assessment: Pt seen for OT treatment session focusing on UEB 5 minute prograde 5 minute retrograde no resistance  Bioness setup for neuro re-education on neuromodulation for x10 minutes w/ panels C/C w/ G tolerance  IASTM questionnaire completed w/ 3/10 contraindications able to participate and tolerated treatment well  Pt engaged in subsequent task w/ focus on FMC/GMC/FMS/GMS via colored cylindrical peg placement onto screw pegboard w/ subsequent placement of colored mushroom pegs w/ RUE w/ max cues for carryover of direction following 2* impulsive w/ poor memory retention, perseverative and repetitive inquiries w/o carryover s/p skilled education      Pt is currently demonstrating the following occupational deficits: limited 2* diplopia w/ binocular vision, R esophoria, L eye droop, L eye exophoric strabismus at rest, PLRG @ 12", no diplopia/fusion for point of convergence, misalignment, jerky pursuits, inaccurate saccades, confused, disoriented, impulsive, distractible, poor problem solving and sequencing, poor safety insight judgment and awareness into deficits, poor attention/concentration to task, requiring max cues to redirect and additional time for delayed processing, decreased STM/immediate delayed recall, decreased working memory, memory retention, dysarthria, facial droop, decreased EF skills, follows simple one step verbal concrete commands w/ max cues for carryover, difficulty w/ pacing/planning, R handed, macrographia, RUE hemiparesis w/ pronator drift w/ hypotonicity proximal>distal, apraxia, ataxia, dysmetria, dysdiadochokinesia, impaired FMC/FMS/GMC/GMS, impaired tactile/proprioceptive kinesthetic sensory input  Tolerated treatment well  Patient would benefit from continued skilled OT  Plan: Continued skilled OT per POC with focus on RUE coordination, strengthening, sensory re-training, memory retention, cog loading, working memory, insight, and awareness, and /VM re-training  INTERVENTION COMMENTS:  Diagnosis: L BG CVA  Precautions: fall risk, impulsive, /VM impairments  FOTO: 77 with 23% limitation  Insurance: [employer]  2 of 24 visits, PN due 9/17/2018     Thank you for the consult!   Please call if you have any questions: [phone]  Mercedes Moreira, [place], OTR/L, C-GCM, CSRS  Director of Outpatient Neuro Occupational Therapy

## 2018-08-20 ENCOUNTER — OFFICE VISIT (OUTPATIENT)
Dept: OCCUPATIONAL THERAPY | Facility: CLINIC | Age: 58
End: 2018-08-20
Payer: COMMERCIAL

## 2018-08-20 ENCOUNTER — OFFICE VISIT (OUTPATIENT)
Dept: SPEECH THERAPY | Facility: CLINIC | Age: 58
End: 2018-08-20
Payer: COMMERCIAL

## 2018-08-20 DIAGNOSIS — I63.9 ISCHEMIC CEREBROVASCULAR ACCIDENT (CVA) (HCC): ICD-10-CM

## 2018-08-20 DIAGNOSIS — R48.8 OTHER SYMBOLIC DYSFUNCTIONS: Primary | ICD-10-CM

## 2018-08-20 DIAGNOSIS — I69.30 HISTORY OF ISCHEMIC CEREBROVASCULAR ACCIDENT (CVA) WITH RESIDUAL DEFICIT: Primary | ICD-10-CM

## 2018-08-20 DIAGNOSIS — I69.322 DYSARTHRIA AS LATE EFFECT OF CEREBELLAR CEREBROVASCULAR ACCIDENT (CVA): ICD-10-CM

## 2018-08-20 PROCEDURE — 92507 TX SP LANG VOICE COMM INDIV: CPT

## 2018-08-20 PROCEDURE — 97110 THERAPEUTIC EXERCISES: CPT

## 2018-08-20 PROCEDURE — 97112 NEUROMUSCULAR REEDUCATION: CPT

## 2018-08-20 NOTE — PROGRESS NOTES
Daily Speech Treatment Note    Today's date: 2018  Patients name: Luis Goins  : 1960  MRN: [id number]  Safety measures: CVA   Referring provider: Azucena Wen MD    Primary Diagnosis/Billing code: Z28 2  Secondary Diagnosis/ Billing code: I63 9, I69 322     Visit Tracking:  -Referring provider: EPIC   -Billing guidelines: AMA  -Visit #   -Chris Barnett  [facility]  (Marium-no auth;[place]    [facility]-auth post 24 visits)   -RE due 2018  Subjective/Behavioral:  -"What therapy do I have tomorrow?" Per OT, Sparkle Lani, pt having difficulty with memory today  Observed reduced memory today in session, during Category member activity (i e  Having to remind pt what the initial letter is, seconds after)  Objective/Assessment:  -Patient's family member/caregiver was present during today's session   -Reviewed testing results and goals in plan care with patient  Patient is in agreement at this time  Short-term goals:  1  Pt to complete full oral-mechanism examination to provide further baseline data for dysarthria goals  MET     -Facial appearance Right facial droop   -Dentition Edentulous, Upper dentures and Lower dentures   -Labial function Decreased ROM (right side), Decreased strength (bilateral) and Decreased coordination   -Lingual function Decreased ROM (bilateral), Decreased ROM (elevation), Decreased strength (bilateral), Decreased strength (protrusion), Decreased protrusion/retraction, Deviaton to the right side and tongue appears to be swollen or enlarged    -Velar function Unable to visualize 2/2 pts large lingual surface       2  Patient will be educated on oral motor exercises and provided an appropriate at home exercise program to be practiced to facilitate improved strength and function for increased speech intelligibility  To be achieved in 4-6 weeks  Pt and wife were educated on oral motor exercises  Pt was provided HEP   Exercises to focus on for homework are the following: Tongue Tip, Side of Tongue, and ROM Tongue exercises  Pt also provided with "What is dysarthria?" handout  Pt and wife were explained and shown how to practice exercises properly when at home  Both verbally expressed understanding; however, unsure if pt understands the purpose of exercises  Will continue to educate  3  Patient will be educated on word finding strategies (i e , circumlocution) for improved generative naming and verbal expression skills  Pt and wife were educated on word finding strategies (i e  Circumlocution- including describing and associating it, taking breaks, and using phonemic cues) to improve expressive language  Wife reports she already uses the phonemic cue strategy at home  During all word finding exercises, wife frequently would provide verbal semantic clues, including learned strategies; at times with too many cues  Pt and wife both verbally expressed understanding  4  Patient will name an average of 15+ items in a category in 60 seconds over 5 trials using compensatory strategies and min cues to facilitate improved word retrieval skills, to be achieved in 4-6 weeks  To target generative naming with initial letter and category provided, patient completed category members acitvity (i e , SPORT, begins with /g/)  Patient appropriately named 15/32 words; increasing to 29/32 with moderate verbal phonemic and semantic cues  Benefits when wife relays a specific memory to increase the verbal expression of the targeted word  Pt noted to have poor immediate memory during task, requiring clinician to repeat category and targeted initial letter frequently  5  Patient will name an average of 10+ words that begin with a specific letter in 60 seconds over 5 trials using compensatory strategies and min cues to facilitate improved word retrieval skills, to be achieved in 4-6 weeks    6  Patient will complete concrete and abstract categorization tasks to 80% accuracy to facilitate improved generative naming skills and working memory, to be achieved in 4-6 weeks  7  Patient will complete deductive reasoning puzzles with 80% accuracy to facilitate increased working memory, problem solving, and processing skills, to be achieved in 4-6 weeks  8  Patient will complete thought organization tasks (e g , sequencing, deduction puzzles, etc ) with 80% accuracy to facilitate increased executive functioning skills, to be achieved in 4-6 weeks  Long-term goals:  1  Patient will develop functional, cognitive-linguistic based skills and utilize compensatory strategies as needed to communicate effectively to different conversational partners, maintain safety, and participate socially by discharge  2  Patient will demonstrate improved expressive and receptive language skills during structured and unstructured tasks by discharge  3  Patient will independently utilize speech intelligibility strategies (e g , over-exaggeration, slow rate, breath groups, etc ) during oral reading tasks >90% intelligibility to facilitate increased generalization of skills into conversational speech by discharge  Plan:  -Patient was provided with home exercises/activities to target goals in plan of care at the end of today's session   -Discussed session and patient's progress with caregiver/family member after today's session   -Continue with current plan of care

## 2018-08-21 ENCOUNTER — OFFICE VISIT (OUTPATIENT)
Dept: PHYSICAL THERAPY | Facility: CLINIC | Age: 58
End: 2018-08-21
Payer: COMMERCIAL

## 2018-08-21 DIAGNOSIS — Z74.09 IMPAIRED FUNCTIONAL MOBILITY, BALANCE, AND ENDURANCE: ICD-10-CM

## 2018-08-21 DIAGNOSIS — I63.9 CEREBROVASCULAR ACCIDENT (CVA), UNSPECIFIED MECHANISM (HCC): Primary | ICD-10-CM

## 2018-08-21 PROCEDURE — 97112 NEUROMUSCULAR REEDUCATION: CPT | Performed by: PHYSICAL THERAPIST

## 2018-08-21 PROCEDURE — 97110 THERAPEUTIC EXERCISES: CPT | Performed by: PHYSICAL THERAPIST

## 2018-08-21 NOTE — PROGRESS NOTES
Daily Note     Today's date: 2018  Patient name: Luis Yoo  : 1960  MRN: [id number]  Referring provider: Gabe Moore MD  Dx:   Encounter Diagnosis     ICD-10-CM    1  Cerebrovascular accident (CVA), unspecified mechanism ([facility] [address] ) I63 9    2  Impaired functional mobility, balance, and endurance Z74 09                   Subjective: Pt has no new complaints  Objective: See treatment diary below    Precautions: FALL risk, DM II, HTN, spinal stimulator (no stim), CDK, poor safety awareness, *Fear of Dogs*     Daily Treatment Diary      Manual                                                                                                                                                      Exercise Diary                        Nustep  L5, 10 min                      STS  2 x 10                      FAEO, firm  30" x 3                     Ambulation with RW  3 laps in hallway                     Semi tandem   30" x 2                     Sidestepping  2 laps                      Alternating step taps 2 x 15                       FW/BBW ambulation in // bars 3 laps                     Weaving in between cones 2 laps                                                                                                                                                                                                                                                                            Modalities                                                                                                       Assessment: Pt easily fatigued, required frequent rest breaks throughout the session  Required encouragement to perform balance exercises without UE  Pt demo moderate instability during turns, and can be impulsive and frequently lift up RW, requiring cues to slow down for safety  Patient would benefit from continued PT      Plan: Progress treatment as tolerated

## 2018-08-22 ENCOUNTER — OFFICE VISIT (OUTPATIENT)
Dept: OCCUPATIONAL THERAPY | Facility: CLINIC | Age: 58
End: 2018-08-22
Payer: COMMERCIAL

## 2018-08-22 ENCOUNTER — TELEPHONE (OUTPATIENT)
Dept: INTERNAL MEDICINE CLINIC | Facility: CLINIC | Age: 58
End: 2018-08-22

## 2018-08-22 ENCOUNTER — OFFICE VISIT (OUTPATIENT)
Dept: SPEECH THERAPY | Facility: CLINIC | Age: 58
End: 2018-08-22
Payer: COMMERCIAL

## 2018-08-22 DIAGNOSIS — I63.9 ISCHEMIC CEREBROVASCULAR ACCIDENT (CVA) (HCC): ICD-10-CM

## 2018-08-22 DIAGNOSIS — I69.322 DYSARTHRIA AS LATE EFFECT OF CEREBELLAR CEREBROVASCULAR ACCIDENT (CVA): ICD-10-CM

## 2018-08-22 DIAGNOSIS — R48.8 OTHER SYMBOLIC DYSFUNCTIONS: Primary | ICD-10-CM

## 2018-08-22 DIAGNOSIS — I69.30 HISTORY OF ISCHEMIC CEREBROVASCULAR ACCIDENT (CVA) WITH RESIDUAL DEFICIT: Primary | ICD-10-CM

## 2018-08-22 PROCEDURE — 97530 THERAPEUTIC ACTIVITIES: CPT

## 2018-08-22 PROCEDURE — 97110 THERAPEUTIC EXERCISES: CPT

## 2018-08-22 PROCEDURE — 92507 TX SP LANG VOICE COMM INDIV: CPT

## 2018-08-22 NOTE — TELEPHONE ENCOUNTER
We don't do disability evaluations for Social security but recommend Dr Karmen Calvo information:    [id number], [address] [place] [address]  [phone]    Can also check on [employer] website for providers locally that complete these exams    thx

## 2018-08-22 NOTE — TELEPHONE ENCOUNTER
Pt son calling to let us know that pt had stroke and unable to work  Also states that pt is in a "bad state financially"    Danny Salinas has social security office fax over release of records for disability today, and would need us to fill out paperwork and mail back to them  Pt son faxing us cover letter to mail with [facility] [employer]        [phone]

## 2018-08-22 NOTE — PROGRESS NOTES
Occupational Therapy Daily Note:    Today's date: 2018  Patient name: Amos Schafer  : 1960  MRN: [id number]  Referring provider: Kyle Story MD  Dx:   Encounter Diagnosis   Name Primary?  History of ischemic cerebrovascular accident (CVA) with residual deficit Yes     Subjective: "I'm tired today"  Objective: See treatment diary below  Assessment: Pt seen for OT treatment session focusing on UEB 5 minute prograde 5 minute retrograde w/o resistance w/ fatigue noted, required rest break between sets  Pt tolerance Bioness x10 minutes RUE for neuro modulation neuro stimulation  Pt tolerated IASTM to RUE for myofascial release  Pt engaged in 2# therabar supine overhead gross motor strengthening x10 reps x2 sets w/ max cues for appropriate body mechanics and body alignment for quality vs quantity movement w/ noted fatigue  Pt engaged in seated at edge of mat task for construction of three colored pegboard pattern w/ large easel and large colored mushroom pegs  Pt required max cues for forward chaining to sequence task, 25% droppage noted  Pt able to complete 1 pegboard pattern w/in 20 minutes      Pt is currently demonstrating the following occupational deficits: limited 2* diplopia w/ binocular vision, R esophoria, L eye droop, L eye exophoric strabismus at rest, PLRG @ 12", no diplopia/fusion for point of convergence, misalignment, jerky pursuits, inaccurate saccades, confused, disoriented, impulsive, distractible, poor problem solving and sequencing, poor safety insight judgment and awareness into deficits, poor attention/concentration to task, requiring max cues to redirect and additional time for delayed processing, decreased STM/immediate delayed recall, decreased working memory, memory retention, dysarthria, facial droop, decreased EF skills, follows simple one step verbal concrete commands w/ max cues for carryover, difficulty w/ pacing/planning, R handed, macrographia, RUE hemiparesis w/ pronator drift w/ hypotonicity proximal>distal, apraxia, ataxia, dysmetria, dysdiadochokinesia, impaired FMC/FMS/GMC/GMS, impaired tactile/proprioceptive kinesthetic sensory input  Tolerated treatment well  Patient would benefit from continued skilled OT      Plan: Continued skilled OT per POC with focus on RUE coordination, strengthening, sensory re-training, memory retention, cog loading, working memory, insight, and awareness, and /VM re-training      INTERVENTION COMMENTS:  Diagnosis: L BG CVA  Precautions: fall risk, impulsive, /VM impairments  FOTO: 77 with 23% limitation  Insurance: [employer]  3 of 24 visits, PN due 9/17/2018     Thank you for the consult!   Please call if you have any questions: [phone]  TRISTAN Mabry, OTR/L, C-BHARATI, CSRS  Director of Outpatient Neuro Occupational Therapy

## 2018-08-22 NOTE — PROGRESS NOTES
Daily Speech Treatment Note    Today's date: 2018  Patients name: Erlinda Alatorre  : 1960  MRN: [id number]  Safety measures: CVA   Referring provider: Roxie Ferguson MD    Primary Diagnosis/Billing code: P72 2  Secondary Diagnosis/ Billing code: I63 9, I69 322     Visit Tracking:  -Referring provider: EPIC   -Billing guidelines: AMA  -Visit #3/24   -[facility]  (Marium-no auth;BOMN    [facility]-auth post 24 visits)   -RE due 2018  Subjective/Behavioral:  -"Well I thought you were going to call the eye doctor for me " Pt with c/o left eye pain  Told pt to tell wife about pain and call doctor if both feel necessary  Pt appearing more confused today, saying he lost 24 pounds since he began therapy (1 week ago)  Spoke with wife who stayed out in waiting room  Per wife, she wanted [place] to feel like he can do therapy on his own and not rely on her  Objective/Assessment:  -Reviewed patient's home exercises/activities completed since last appointment  Pt reports he forgets to do oral motor strengthening eercises  Recommended pt keep exercise sheet near recliner to do while watching TV  Pt verbally expressed understanding  Short-term goals:    2  Patient will be educated on oral motor exercises and provided an appropriate at home exercise program to be practiced to facilitate improved strength and function for increased speech intelligibility  To be achieved in 4-6 weeks  Pt re-educated on importance of completion of HEP for oral motor exercises to improve articulation and speech clarity  Exercises to focus on for homework are the following: Tongue Tip, Side of Tongue, and ROM Tongue exercises  3  Patient will be educated on word finding strategies (i e , circumlocution) for improved generative naming and verbal expression skills        4  Patient will name an average of 15+ items in a category in 60 seconds over 5 trials using compensatory strategies and min cues to facilitate improved word retrieval skills, to be achieved in 4-6 weeks  5  Patient will name an average of 10+ words that begin with a specific letter in 60 seconds over 5 trials using compensatory strategies and min cues to facilitate improved word retrieval skills, to be achieved in 4-6 weeks  6  Patient will complete concrete and abstract categorization tasks to 80% accuracy to facilitate improved generative naming skills and working memory, to be achieved in 4-6 weeks  To target generative naming with initial letter and category provided, patient completed category members acitvity (i e , SPORT, begins with /g/)  Patient appropriately named 4/16 words independently, increasing to 13/16 words with mod-max verbal semantic cues  Noted to have poor immediate memory during task, requiring clinician to repeat category and targeted initial letter frequently  To target word generation using divergent classification, patient was asked to name 3 items in a concrete category (i e , fruits)  Task completed in 6/9 trials independently, and increasing to 7/9 trials with mod verbal semantic and phonemic cues  To target word generation, pt asked to write name of targeted picture on paper  Task completed in 8/10 opp independently, increasing to 10/10 with mod verbal semantic and phonemic cues  Exhibits difficulty with spelling and writing, however slightly legible when given context  OT targeting writing  7  Patient will complete deductive reasoning puzzles with 80% accuracy to facilitate increased working memory, problem solving, and processing skills, to be achieved in 4-6 weeks  To target processing skills and problem solving, pt was given an iniital part of a sentence and asked to complete it to make appropriate sense (i e  "I wish I was a ___")  Task completed in 22/25 opp independently, increasing to 25/25 with min verbal semantic cues to improve appropriateness of answer       8  Patient will complete thought organization tasks (e g , sequencing, deduction puzzles, etc ) with 80% accuracy to facilitate increased executive functioning skills, to be achieved in 4-6 weeks  Patient was asked to fill in missing letters within a 10 word list of words within a category (i e , fruits; o_an_e (orange)  Pt unable to complete task despite max verbal cues; appeared to be confused  Decreased task difficulty by providing word bank of 10 targeted words in category for visual cue  Pt then able to complete task in 10/10 opp  Will continue to target  During word generation task #3, pt then asked to sort targeted picture names in order by alphabet  To complete task with 10 items, pt required mod verbal semantic cues to complete to 100%  Pt noted to be skipping over letters in the alphabet, however had good scanning and organization by going through each letter individually  Plan:  -Patient was provided with home exercises/activities to target goals in plan of care at the end of today's session   -Discussed session and patient's progress with caregiver/family member after today's session   -Continue with current plan of care

## 2018-08-22 NOTE — TELEPHONE ENCOUNTER
Patient states they do not need an evaluation, they just need medical records faxed and son is faxing the cover letter to where the medical records need to go

## 2018-08-24 ENCOUNTER — TELEPHONE (OUTPATIENT)
Dept: INTERNAL MEDICINE CLINIC | Facility: CLINIC | Age: 58
End: 2018-08-24

## 2018-08-24 ENCOUNTER — APPOINTMENT (OUTPATIENT)
Dept: PHYSICAL THERAPY | Facility: CLINIC | Age: 58
End: 2018-08-24
Payer: COMMERCIAL

## 2018-08-24 DIAGNOSIS — E11.42 DIABETIC POLYNEUROPATHY ASSOCIATED WITH TYPE 2 DIABETES MELLITUS (HCC): Primary | ICD-10-CM

## 2018-08-24 RX ORDER — GABAPENTIN 250 MG/5ML
600 SOLUTION ORAL
Qty: 470 ML | Refills: 2 | Status: SHIPPED | OUTPATIENT
Start: 2018-08-24 | End: 2018-12-17 | Stop reason: SDUPTHER

## 2018-08-24 NOTE — TELEPHONE ENCOUNTER
Pt wife calling stating pt will not take Gabapentin 600mg Rx d/t it being a "huge pill " Is there a liquid form available?       [facility] [address]

## 2018-08-24 NOTE — TELEPHONE ENCOUNTER
Yes, it does    Ordered gabapentin oral solution to pt's pharmacy at same 600mg once a day dose    thanks

## 2018-08-27 ENCOUNTER — OFFICE VISIT (OUTPATIENT)
Dept: PHYSICAL THERAPY | Facility: CLINIC | Age: 58
End: 2018-08-27
Payer: COMMERCIAL

## 2018-08-27 ENCOUNTER — OFFICE VISIT (OUTPATIENT)
Dept: SPEECH THERAPY | Facility: CLINIC | Age: 58
End: 2018-08-27
Payer: COMMERCIAL

## 2018-08-27 DIAGNOSIS — I69.322 DYSARTHRIA AS LATE EFFECT OF CEREBELLAR CEREBROVASCULAR ACCIDENT (CVA): ICD-10-CM

## 2018-08-27 DIAGNOSIS — Z74.09 IMPAIRED FUNCTIONAL MOBILITY, BALANCE, AND ENDURANCE: ICD-10-CM

## 2018-08-27 DIAGNOSIS — R48.8 OTHER SYMBOLIC DYSFUNCTIONS: Primary | ICD-10-CM

## 2018-08-27 DIAGNOSIS — I63.9 ISCHEMIC CEREBROVASCULAR ACCIDENT (CVA) (HCC): ICD-10-CM

## 2018-08-27 DIAGNOSIS — I63.9 CEREBROVASCULAR ACCIDENT (CVA), UNSPECIFIED MECHANISM (HCC): Primary | ICD-10-CM

## 2018-08-27 PROCEDURE — 92507 TX SP LANG VOICE COMM INDIV: CPT

## 2018-08-27 PROCEDURE — 97110 THERAPEUTIC EXERCISES: CPT | Performed by: PHYSICAL THERAPIST

## 2018-08-27 PROCEDURE — 97112 NEUROMUSCULAR REEDUCATION: CPT | Performed by: PHYSICAL THERAPIST

## 2018-08-27 PROCEDURE — 97116 GAIT TRAINING THERAPY: CPT | Performed by: PHYSICAL THERAPIST

## 2018-08-27 NOTE — PROGRESS NOTES
Daily Note     Today's date: 2018  Patient name: Katie Sunshine  : 1960  MRN: [id number]  Referring provider: Beba Craven MD  Dx:   Encounter Diagnosis     ICD-10-CM    1  Cerebrovascular accident (CVA), unspecified mechanism ([facility] [address] ) I63 9    2  Impaired functional mobility, balance, and endurance Z74 09                   Subjective: Pt reports that he is tired  Objective: See treatment diary below    Precautions: FALL risk, DM II, HTN, spinal stimulator (no stim), CDK, poor safety awareness, *Fear of Dogs*     Daily Treatment Diary      Manual                                                                                                                                                      Exercise Diary                      Nustep  L5, 10 min   L5, 6 min                   STS  2 x 10   2 x 10                    FAEO, firm  30" x 3  FTEO, firm 30" x 3                   Ambulation with RW  3 laps in hallway  No AD   3 laps in hallway                    Semi tandem   30" x 2 30" x 2 ea                   Sidestepping  2 laps                      Alternating step taps 2 x 15   4" step 2 x 10                     FW/BBW ambulation in // bars 3 laps                     Weaving in between cones 2 laps   2 laps, no AD                    Stepping over hurdles   Fw/lat 2 laps  unilateral // bars                                                                                                                                                                                                                                                 Modalities                                                                                                       Assessment: Pt demo increased fatigue this session, given frequent seated rest breaks throughout the session  Continues to be challenged by narrow JOSE DAVID on firm surface   Pt was able to ambulate without AD today and CGA, however demo moderate path deviations and ataxic gait  Pt was challenged when weaving between cones without AD murali to R, but improved performance when verbally cued to perform slower and avoid crossing LEs  Also challenged with stepping over hurdles, has occasional posterior LOB during turns, requiring therapist to prevent fall  Patient would benefit from continued PT      Plan: Progress treatment as tolerated  Progress endurance as able

## 2018-08-27 NOTE — PROGRESS NOTES
Daily Speech Treatment Note    Today's date: 2018  Patients name: Marisabel Gould  : 1960  MRN: [id number]  Safety measures: CVA   Referring provider: Norm Cullen MD    Primary Diagnosis/Billing code: M75 0  Secondary Diagnosis/ Billing code: I63 9, I69 322     Visit Tracking:  -Referring provider: EPIC   -Billing guidelines: AMA  -Visit #   -[facility]  [facility]  ([place]-no auth;BOMN    [facility]-auth post 24 visits)   -RE due 2018  Subjective/Behavioral:  -"I just can't find the word, but I know it " Pt's wife present during session  Reports that he needs to go see a counselor or psychologist but refuses to do so  Educated pt on importance of going to see someone  Provided wife with behavioral health services through [address] but wife states she is already aware of the services  Pt still in disagreement about receiving services post discussion  Objective/Assessment:  -Patient's family member/caregiver was present during today's session   -Reviewed patient's home exercises/activities completed since last appointment  Pt's wife reports he refuses to do oral motor exercises  Were completed during session today instead  Short-term goals:    2  Patient will be educated on oral motor exercises and provided an appropriate at home exercise program to be practiced to facilitate improved strength and function for increased speech intelligibility  To be achieved in 4-6 weeks  Pt and wife were re-educated on importance of completion of HEP for oral motor exercises to improve articulation and speech clarity  Pt admits to not completing them at home  During session, pt completed tongue tip raise to nose 10x, tongue tip to right side and held for 5 sec 5x, tongue tip to left side and held for 5sec 5x, and alternating right/left tongue tip corner to corner 10x        3  Patient will be educated on word finding strategies (i e , circumlocution) for improved generative naming and verbal expression skills  4  Patient will name an average of 15+ items in a category in 60 seconds over 5 trials using compensatory strategies and min cues to facilitate improved word retrieval skills, to be achieved in 4-6 weeks  5  Patient will name an average of 10+ words that begin with a specific letter in 60 seconds over 5 trials using compensatory strategies and min cues to facilitate improved word retrieval skills, to be achieved in 4-6 weeks  6  Patient will complete concrete and abstract categorization tasks to 80% accuracy to facilitate improved generative naming skills and working memory, to be achieved in 4-6 weeks  To target generative naming with initial letter and category provided, patient completed category members acitvity (i e , SPORT, begins with /g/)  Patient appropriately named 16/32 words independently, increasing to 26/32 words with mod-max verbal semantic cues  Noted to have poor immediate memory during task, requiring clinician to repeat category and targeted initial letter frequently  To target generative naming skills, pt was shown picture of object and asked to name the item  Task completed in 19/25 opp independently, increasing to 23/25 with binary choices, verbal semantic cues, and phonemic cues  7  Patient will complete deductive reasoning puzzles with 80% accuracy to facilitate increased working memory, problem solving, and processing skills, to be achieved in 4-6 weeks  8  Patient will complete thought organization tasks (e g , sequencing, deduction puzzles, etc ) with 80% accuracy to facilitate increased executive functioning skills, to be achieved in 4-6 weeks  To target thought processing, pt will be given 3 picture cards and asked to place in alphabetical order  In a F:3, trial completed in 4/5 opp independently, increasing to 5/5 with min verbal cues  In a F:4, trial completed in 0/3 opp, increasing to 1/3 opp with mod verbal cues       To target sequencing and thought process organization, pt labeled 4-step sequences 1 through 4  Required mod verbal cues for completion of all 8 opp  Plan:  -Patient was provided with home exercises/activities to target goals in plan of care at the end of today's session   -Discussed session and patient's progress with caregiver/family member after today's session   -Continue with current plan of care

## 2018-08-30 ENCOUNTER — OFFICE VISIT (OUTPATIENT)
Dept: SPEECH THERAPY | Facility: CLINIC | Age: 58
End: 2018-08-30
Payer: COMMERCIAL

## 2018-08-30 ENCOUNTER — OFFICE VISIT (OUTPATIENT)
Dept: PHYSICAL THERAPY | Facility: CLINIC | Age: 58
End: 2018-08-30
Payer: COMMERCIAL

## 2018-08-30 DIAGNOSIS — I63.9 CEREBROVASCULAR ACCIDENT (CVA), UNSPECIFIED MECHANISM (HCC): Primary | ICD-10-CM

## 2018-08-30 DIAGNOSIS — Z74.09 IMPAIRED FUNCTIONAL MOBILITY, BALANCE, AND ENDURANCE: ICD-10-CM

## 2018-08-30 DIAGNOSIS — R48.8 OTHER SYMBOLIC DYSFUNCTIONS: Primary | ICD-10-CM

## 2018-08-30 DIAGNOSIS — I63.9 ISCHEMIC CEREBROVASCULAR ACCIDENT (CVA) (HCC): ICD-10-CM

## 2018-08-30 PROCEDURE — 97116 GAIT TRAINING THERAPY: CPT | Performed by: PHYSICAL THERAPIST

## 2018-08-30 PROCEDURE — 97112 NEUROMUSCULAR REEDUCATION: CPT | Performed by: PHYSICAL THERAPIST

## 2018-08-30 PROCEDURE — 92507 TX SP LANG VOICE COMM INDIV: CPT

## 2018-08-30 NOTE — PROGRESS NOTES
Daily Speech Treatment Note    Today's date: 2018  Patients name: Annabelle Cordero  : 1960  MRN: [id number]  Safety measures: CVA   Referring provider: Jake Muniz MD    Primary Diagnosis/Billing code: L39 7  Secondary Diagnosis/ Billing code: I63 9, I69 322     Visit Tracking:  -Referring provider: EPIC   -Billing guidelines: AMA  -Visit #   -[facility]  [facility]  ([facility]-no auth;BOMN    [facility]-auth post 24 visits)   -RE due 2018  Subjective/Behavioral:  -"Should I bring in hotdogs and hamburgers for us?" Pt's wife present during session, attempting to help during session, however Edythe Duverney becomes frustrated with her  Will speak with wife next session regarding his independence to come into therapy alone  Objective/Assessment:  -Patient's family member/caregiver was present during today's session   -Reviewed patient's home exercises/activities completed since last appointment  Pt's wife reports he refuses to do oral motor exercises  Were completed during session today instead  Short-term goals:    2  Patient will be educated on oral motor exercises and provided an appropriate at home exercise program to be practiced to facilitate improved strength and function for increased speech intelligibility  To be achieved in 4-6 weeks  3  Patient will be educated on word finding strategies (i e , circumlocution) for improved generative naming and verbal expression skills  - To target word finding and spelling, pt using [employer] [facility]-medium level  Pt was provided a picture, 3-4 blanks for letters, and was required to fill in blanks given 6 scrambeled letters  Pt completed task in 10/10 opp with min verbal semantic cues  To target expressive language, pt identified 8/10 objects independently, increasing to 10/10 with mod verbal semantic cues  - To target reading skills and auditory comprehension, pt used [employer]- medium level   Visually provided with a sentence and was asked to fill in the blank given F:4 word choices  Task completed in 17/25 opp independently, increasing to 25/25 with description cue=2; first letter= 3       4  Patient will name an average of 15+ items in a category in 60 seconds over 5 trials using compensatory strategies and min cues to facilitate improved word retrieval skills, to be achieved in 4-6 weeks  5  Patient will name an average of 10+ words that begin with a specific letter in 60 seconds over 5 trials using compensatory strategies and min cues to facilitate improved word retrieval skills, to be achieved in 4-6 weeks  6  Patient will complete concrete and abstract categorization tasks to 80% accuracy to facilitate improved generative naming skills and working memory, to be achieved in 4-6 weeks  7  Patient will complete deductive reasoning puzzles with 80% accuracy to facilitate increased working memory, problem solving, and processing skills, to be achieved in 4-6 weeks  8  Patient will complete thought organization tasks (e g , sequencing, deduction puzzles, etc ) with 80% accuracy to facilitate increased executive functioning skills, to be achieved in 4-6 weeks  -To target word finding, pt is given a definition with partially empty blanks (i e  "one article of a collection --> it _ _ )  Task too difficult without word bank  Word bank was provided and task was completed in 5/5 opp with visual cues  Plan:  -Patient was provided with home exercises/activities to target goals in plan of care at the end of today's session   -Discussed session and patient's progress with caregiver/family member after today's session   -Continue with current plan of care

## 2018-08-30 NOTE — PROGRESS NOTES
Daily Note     Today's date: 2018  Patient name: Burgess Almonte  : 1960  MRN: [id number]  Referring provider: Ashlyn Khoury MD  Dx:   Encounter Diagnosis     ICD-10-CM    1  Cerebrovascular accident (CVA), unspecified mechanism ([facility] [address] ) I63 9    2  Impaired functional mobility, balance, and endurance Z74 09                   Subjective: Pt has no new complaints  Objective: See treatment diary below    Precautions: FALL risk, DM II, HTN, spinal stimulator (no stim), CDK, poor safety awareness, *Fear of Dogs*     Daily Treatment Diary      Manual                                                                                                                                                      Exercise Diary                    Nustep  L5, 10 min   L5, 6 min  L5, 5 min                 STS  2 x 10   2 x 10   2 x 10                  FAEO, firm  30" x 3  FTEO, firm 30" x 3  FAEC, firm 30" x 3                 Ambulation with RW  3 laps in hallway  No AD   3 laps in hallway   No AD   3 laps in hallway                 Semi tandem   30" x 2 30" x 2 ea  30" x 2                 Sidestepping  2 laps     2 laps                 Alternating step taps 2 x 15   4" step 2 x 10  6" step 2 x 10                   FW/BBW ambulation in // bars 3 laps                     Weaving in between cones 2 laps   2 laps, no AD  3 laps, no AD                  Stepping over hurdles   Fw/lat 2 laps  unilateral // bars  Fw/lat 2 laps  unilateral // bars                                                                                                                                                                                                                                               Modalities                                                                                                       Assessment: Pt demo improved static balance this session and tolerated new progressions well   Continues to be impulsive and ataxic during ambulation and turns, required for verbal cues to slow down and demo improved stability when performed slowly  Also noted to lose balance laterally to R during ambulation in the hallway and weaving between cones, frequently bumps into wall on R and knock over cones when going around to his R  Pt has difficulty performing sidestepping, demo occasional LOB due to impulsivity and required constant cueing for LE positioning  Easily fatigued and required frequent seated breaks throughout session  Patient would benefit from continued PT      Plan: Progress treatment as tolerated

## 2018-09-03 ENCOUNTER — HOSPITAL ENCOUNTER (EMERGENCY)
Facility: HOSPITAL | Age: 58
Discharge: HOME | End: 2018-09-03
Attending: EMERGENCY MEDICINE | Admitting: EMERGENCY MEDICINE
Payer: COMMERCIAL

## 2018-09-03 VITALS
DIASTOLIC BLOOD PRESSURE: 79 MMHG | OXYGEN SATURATION: 96 % | TEMPERATURE: 98.2 F | SYSTOLIC BLOOD PRESSURE: 149 MMHG | HEART RATE: 78 BPM | RESPIRATION RATE: 17 BRPM

## 2018-09-03 DIAGNOSIS — E16.2 HYPOGLYCEMIA: Primary | ICD-10-CM

## 2018-09-03 LAB
ALBUMIN SERPL BCP-MCNC: 3.3 G/DL (ref 3.5–5)
ALP SERPL-CCNC: 162 U/L (ref 46–116)
ALT SERPL W P-5'-P-CCNC: 34 U/L (ref 12–78)
ANION GAP SERPL CALCULATED.3IONS-SCNC: 11 MMOL/L (ref 4–13)
AST SERPL W P-5'-P-CCNC: 20 U/L (ref 5–45)
BASOPHILS # BLD AUTO: 0.03 THOUSANDS/ΜL (ref 0–0.1)
BASOPHILS NFR BLD AUTO: 0 % (ref 0–1)
BILIRUB SERPL-MCNC: 0.4 MG/DL (ref 0.2–1)
BUN SERPL-MCNC: 43 MG/DL (ref 5–25)
CALCIUM SERPL-MCNC: 9.1 MG/DL (ref 8.3–10.1)
CHLORIDE SERPL-SCNC: 108 MMOL/L (ref 100–108)
CO2 SERPL-SCNC: 25 MMOL/L (ref 21–32)
CREAT SERPL-MCNC: 2.67 MG/DL (ref 0.6–1.3)
EOSINOPHIL # BLD AUTO: 0.15 THOUSAND/ΜL (ref 0–0.61)
EOSINOPHIL NFR BLD AUTO: 2 % (ref 0–6)
ERYTHROCYTE [DISTWIDTH] IN BLOOD BY AUTOMATED COUNT: 13.3 % (ref 11.6–15.1)
GFR SERPL CREATININE-BSD FRML MDRD: 25 ML/MIN/1.73SQ M
GLUCOSE SERPL-MCNC: 103 MG/DL (ref 65–140)
GLUCOSE SERPL-MCNC: 120 MG/DL (ref 65–140)
GLUCOSE SERPL-MCNC: 175 MG/DL
GLUCOSE SERPL-MCNC: 175 MG/DL (ref 65–140)
HCT VFR BLD AUTO: 35.9 % (ref 36.5–49.3)
HGB BLD-MCNC: 11.6 G/DL (ref 12–17)
IMM GRANULOCYTES # BLD AUTO: 0.05 THOUSAND/UL (ref 0–0.2)
IMM GRANULOCYTES NFR BLD AUTO: 1 % (ref 0–2)
LYMPHOCYTES # BLD AUTO: 1.7 THOUSANDS/ΜL (ref 0.6–4.47)
LYMPHOCYTES NFR BLD AUTO: 19 % (ref 14–44)
MCH RBC QN AUTO: 28.7 PG (ref 26.8–34.3)
MCHC RBC AUTO-ENTMCNC: 32.3 G/DL (ref 31.4–37.4)
MCV RBC AUTO: 89 FL (ref 82–98)
MONOCYTES # BLD AUTO: 0.67 THOUSAND/ΜL (ref 0.17–1.22)
MONOCYTES NFR BLD AUTO: 7 % (ref 4–12)
NEUTROPHILS # BLD AUTO: 6.52 THOUSANDS/ΜL (ref 1.85–7.62)
NEUTS SEG NFR BLD AUTO: 71 % (ref 43–75)
NRBC BLD AUTO-RTO: 0 /100 WBCS
PLATELET # BLD AUTO: 194 THOUSANDS/UL (ref 149–390)
PMV BLD AUTO: 10.3 FL (ref 8.9–12.7)
POTASSIUM SERPL-SCNC: 4.5 MMOL/L (ref 3.5–5.3)
PROT SERPL-MCNC: 7.4 G/DL (ref 6.4–8.2)
RBC # BLD AUTO: 4.04 MILLION/UL (ref 3.88–5.62)
SODIUM SERPL-SCNC: 144 MMOL/L (ref 136–145)
WBC # BLD AUTO: 9.12 THOUSAND/UL (ref 4.31–10.16)

## 2018-09-03 PROCEDURE — 96375 TX/PRO/DX INJ NEW DRUG ADDON: CPT

## 2018-09-03 PROCEDURE — 85025 COMPLETE CBC W/AUTO DIFF WBC: CPT | Performed by: EMERGENCY MEDICINE

## 2018-09-03 PROCEDURE — 99285 EMERGENCY DEPT VISIT HI MDM: CPT

## 2018-09-03 PROCEDURE — 80053 COMPREHEN METABOLIC PANEL: CPT | Performed by: EMERGENCY MEDICINE

## 2018-09-03 PROCEDURE — 36415 COLL VENOUS BLD VENIPUNCTURE: CPT | Performed by: EMERGENCY MEDICINE

## 2018-09-03 PROCEDURE — 82948 REAGENT STRIP/BLOOD GLUCOSE: CPT

## 2018-09-03 PROCEDURE — 93005 ELECTROCARDIOGRAM TRACING: CPT

## 2018-09-03 PROCEDURE — 96374 THER/PROPH/DIAG INJ IV PUSH: CPT

## 2018-09-03 RX ORDER — DEXTROSE MONOHYDRATE 25 G/50ML
25 INJECTION, SOLUTION INTRAVENOUS ONCE
Status: COMPLETED | OUTPATIENT
Start: 2018-09-03 | End: 2018-09-03

## 2018-09-03 RX ADMIN — DEXTROSE MONOHYDRATE 25 ML: 25 INJECTION, SOLUTION INTRAVENOUS at 19:19

## 2018-09-03 RX ADMIN — GLUCAGON HYDROCHLORIDE 1 MG: KIT at 19:19

## 2018-09-03 NOTE — ED PROVIDER NOTES
History  Chief Complaint   Patient presents with    Hypoglycemia - Symptomatic     Patient's wife reports she cannot get patient's sugar up  Was 54mg/dL, gave glucose, then came up to only 70  Patient c/o dizziness  [de-identified] year [de-identified]89-year-old male comes in complaining of low blood sugar  Patient's wife said that he spent most of the day outside in the heat and did not drink much she came in to check his blood sugar it was 54 she gave him a glucose tabs only came up to 70 and then dropped again  Patient's wife became concerned and brought him here to the emergency department  Patient's only complaint is of dizziness  Patient a stroke approximately 5 weeks ago and still has residual deficits, making him a poor historian        History provided by:  Patient and spouse   used: No    Hypoglycemia - Symptomatic   Initial blood sugar:  54  Blood sugar after intervention:  70  Severity:  Mild  Onset quality:  Sudden  Timing:  Constant  Progression:  Worsening  Chronicity:  New  Diabetic status:  Controlled with insulin  Current diabetic therapy:  See med list  Context: decreased oral intake and recent illness    Associated symptoms: weakness    Associated symptoms: no altered mental status, no dizziness, no seizures and no shortness of breath    Risk factors: no alcohol abuse, no family hx of hypoglycemia and no uncontrolled diabetes        Prior to Admission Medications   Prescriptions Last Dose Informant Patient Reported? Taking? Blood Glucose Monitoring Suppl (ONE TOUCH ULTRA 2) w/Device KIT  Self No No   Sig: by Does not apply route 3 (three) times a day   Blood Pressure Monitoring (BLOOD PRESSURE CUFF) MISC  Self No No   Sig: Use to check blood pressure before taking blood pressure medication and 1 hour after and follow instructions provided in discharge instructions based on the readings     amLODIPine (NORVASC) 5 mg tablet  Self No No   Sig: Take 1 tablet (5 mg total) by mouth every 12 (twelve) hours   aspirin (ECOTRIN LOW STRENGTH) 81 mg EC tablet  Self Yes No   Sig: Take 81 mg by mouth daily Resume on 8/14   atorvastatin (LIPITOR) 80 mg tablet  Self No No   Sig: Take 1 tablet (80 mg total) by mouth daily with dinner   carvedilol (COREG) 6 25 mg tablet   No No   Sig: Take 1 tablet (6 25 mg total) by mouth 2 (two) times a day with meals   diphenoxylate-atropine (LOMOTIL) 2 5-0 025 mg per tablet   Yes No   Sig: Take 1 tablet by mouth 4 (four) times a day as needed for diarrhea   famotidine (PEPCID) 20 mg tablet  Self Yes No   Sig: Take 20 mg by mouth daily Resume on 8/14   gabapentin (NEURONTIN) 250 mg/5 mL solution   No No   Sig: Take 12 mL (600 mg total) by mouth daily at bedtime   glucose 4 g chewable tablet   No No   Sig: Chew 3 tablets (12 g total) as needed for low blood sugar   insulin aspart protamine-insulin aspart (NovoLOG 70/30) 100 units/mL injection   Yes No   Sig: Administer 40 U in AM and 38 U in evening or as directed   lactulose 20 g/30 mL   No No   Sig: Take 30 mL (20 g total) by mouth daily as needed (constipation)   neomycin-bacitracin-polymyxin b (NEOSPORIN) ointment  Self Yes No   Sig: Apply 1 application topically 2 (two) times a day Apply twice per day to shin wound until fully healed  If not fully healed in 5 days contact your family doctor   Next application is the evening of 8/13      Facility-Administered Medications: None       Past Medical History:   Diagnosis Date    Diabetes mellitus ([facility] [address] )     Hypercholesteremia     Hyperlipidemia     Hypertension     Neuropathy     Obesity     Osteomyelitis ([facility] [address] )     last assessed 11/4/16    PVC's (premature ventricular contractions)     sees cardiology Dr Antony camargo       Past Surgical History:   Procedure Laterality Date    ABDOMINAL SURGERY      CHOLECYSTECTOMY      Percutaneous    OTHER SURGICAL HISTORY      "stimulator to control bowel movements"    IA ESOPHAGOGASTRODUODENOSCOPY TRANSORAL DIAGNOSTIC N/A 9/27/2016    Procedure: ESOPHAGOGASTRODUODENOSCOPY (EGD); Surgeon: Rey Barney MD;  [facility]: [facility]; Service: Gastroenterology    OR LAP,CHOLECYSTECTOMY N/A 2/29/2016    Procedure: LAPAROSCOPIC CHOLECYSTECTOMY ;  Surgeon: Lori Bolanos DO;  [facility];  Service: General    ROTATOR CUFF REPAIR Right     TOE AMPUTATION Right 10/28/2016    Procedure: 3RD TOE AMPUTATION ;  Surgeon: Antonietta Grace DPM;  [facility];  Service:        Family History   Problem Relation Age of Onset    Leukemia Mother     Liver disease Mother     Lung cancer Mother         heavy smoker - 3 ppd    Heart disease Father     Liver disease Father     Multiple myeloma Sister     Breast cancer Sister     Urolithiasis Family     Alcohol abuse Neg Hx     Depression Neg Hx     Drug abuse Neg Hx     Substance Abuse Neg Hx      I have reviewed and agree with the history as documented  Social History   Substance Use Topics    Smoking status: Never Smoker    Smokeless tobacco: Never Used    Alcohol use No        Review of Systems   Constitutional: Negative for activity change and appetite change  HENT: Negative for congestion and facial swelling  Eyes: Negative for discharge and redness  Respiratory: Negative for cough, shortness of breath and wheezing  Cardiovascular: Negative for chest pain and leg swelling  Gastrointestinal: Negative for abdominal distention, abdominal pain and blood in stool  Endocrine: Negative for cold intolerance and polydipsia  Genitourinary: Negative for difficulty urinating and hematuria  Musculoskeletal: Negative for arthralgias and gait problem  Skin: Negative for color change and rash  Allergic/Immunologic: Negative for food allergies and immunocompromised state  Neurological: Positive for weakness  Negative for dizziness, seizures and headaches  Hematological: Negative for adenopathy  Does not bruise/bleed easily     Psychiatric/Behavioral: Negative for agitation, confusion and decreased concentration  All other systems reviewed and are negative  Physical Exam  Physical Exam   Constitutional: He is oriented to person, place, and time  He appears well-developed and well-nourished  Non-toxic appearance  HENT:   Head: Normocephalic and atraumatic  Right Ear: Tympanic membrane normal    Left Ear: Tympanic membrane normal    Nose: Nose normal    Mouth/Throat: Oropharynx is clear and moist    Eyes: Conjunctivae, EOM and lids are normal  Pupils are equal, round, and reactive to light  Neck: Trachea normal and normal range of motion  Neck supple  No JVD present  Carotid bruit is not present  Cardiovascular: Normal rate, regular rhythm, normal heart sounds and intact distal pulses  No extrasystoles are present  Pulmonary/Chest: Effort normal  He has no decreased breath sounds  He has no wheezes  He has no rhonchi  He has no rales  He exhibits no tenderness and no deformity  Abdominal: Soft  Bowel sounds are normal  There is no tenderness  There is no rebound, no guarding and no CVA tenderness  Musculoskeletal:        Right shoulder: He exhibits normal range of motion, no tenderness, no swelling and no deformity  Cervical back: Normal  He exhibits normal range of motion, no tenderness, no bony tenderness and no deformity  Lymphadenopathy:     He has no cervical adenopathy  He has no axillary adenopathy  Neurological: He is alert and oriented to person, place, and time  He has normal strength and normal reflexes  No cranial nerve deficit or sensory deficit  He displays a negative Romberg sign  Residual left-sided facial droop from strokes 5 weeks ago   Skin: Skin is warm and dry  Psychiatric: He has a normal mood and affect  His speech is normal and behavior is normal  Judgment and thought content normal  Cognition and memory are normal    Nursing note and vitals reviewed        Vital Signs  ED Triage Vitals   Temperature Pulse Respirations Blood Pressure SpO2   09/03/18 1855 09/03/18 1853 09/03/18 1853 09/03/18 1853 09/03/18 1853   98 2 °F (36 8 °C) 78 17 149/79 96 %      Temp Source Heart Rate Source Patient Position - Orthostatic VS BP Location FiO2 (%)   09/03/18 1855 09/03/18 1853 09/03/18 1853 09/03/18 1853 --   Oral Monitor Sitting Left arm       Pain Score       09/03/18 1853       No Pain           Vitals:    09/03/18 1853   BP: 149/79   Pulse: 78   Patient Position - Orthostatic VS: Sitting       Visual Acuity      ED Medications  Medications   glucagon (GLUCAGEN) injection 1 mg (1 mg Intravenous Given 9/3/18 1919)   dextrose 50 % IV solution 25 mL (25 mL Intravenous Given 9/3/18 1919)       Diagnostic Studies  Results Reviewed     Procedure Component Value Units Date/Time    Fingerstick Glucose (POCT) [[id number]]  (Normal) Resulted:  09/03/18 2032    Lab Status:  Final result Updated:  09/03/18 2054     POC Glucose 175 mg/dl     Fingerstick Glucose (POCT) [[id number]]  (Abnormal) Collected:  09/03/18 2032    Lab Status:  Final result Updated:  09/03/18 2035     POC Glucose 175 (H) mg/dl     Comprehensive metabolic panel [[id number]]  (Abnormal) Collected:  09/03/18 1919    Lab Status:  Final result Specimen:  Blood from Arm, Right Updated:  09/03/18 1946     Sodium 144 mmol/L      Potassium 4 5 mmol/L      Chloride 108 mmol/L      CO2 25 mmol/L      ANION GAP 11 mmol/L      BUN 43 (H) mg/dL      Creatinine 2 67 (H) mg/dL      Glucose 103 mg/dL      Calcium 9 1 mg/dL      AST 20 U/L      ALT 34 U/L      Alkaline Phosphatase 162 (H) U/L      Total Protein 7 4 g/dL      Albumin 3 3 (L) g/dL      Total Bilirubin 0 40 mg/dL      eGFR 25 ml/min/1 73sq m     Narrative:         National Kidney Disease Education Program recommendations are as follows:  GFR calculation is accurate only with a steady state creatinine  Chronic Kidney disease less than 60 ml/min/1 73 sq  meters  Kidney failure less than 15 ml/min/1 73 sq  meters      CBC and differential [[id number]]  (Abnormal) Collected:  09/03/18 1919    Lab Status:  Final result Specimen:  Blood from Arm, Right Updated:  09/03/18 1928     WBC 9 12 Thousand/uL      RBC 4 04 Million/uL      Hemoglobin 11 6 (L) g/dL      Hematocrit 35 9 (L) %      MCV 89 fL      MCH 28 7 pg      MCHC 32 3 g/dL      RDW 13 3 %      MPV 10 3 fL      Platelets 054 Thousands/uL      nRBC 0 /100 WBCs      Neutrophils Relative 71 %      Immat GRANS % 1 %      Lymphocytes Relative 19 %      Monocytes Relative 7 %      Eosinophils Relative 2 %      Basophils Relative 0 %      Neutrophils Absolute 6 52 Thousands/µL      Immature Grans Absolute 0 05 Thousand/uL      Lymphocytes Absolute 1 70 Thousands/µL      Monocytes Absolute 0 67 Thousand/µL      Eosinophils Absolute 0 15 Thousand/µL      Basophils Absolute 0 03 Thousands/µL     Fingerstick Glucose (POCT) [[id number]]  (Normal) Collected:  09/03/18 1900    Lab Status:  Final result Updated:  09/03/18 1901     POC Glucose 120 mg/dl                  No orders to display              Procedures  ECG 12 Lead Documentation  Date/Time: 9/3/2018 7:44 PM  Performed by: Ketan Reasonirma  Authorized by: Michelle BRAGA     Patient location:  ED  Rate:     ECG rate:  73  Rhythm:     Rhythm: sinus rhythm    Ectopy:     Ectopy: none             Phone Contacts  ED Phone Contact    ED Course  ED Course as of Sep 03 2124   Mon Sep 03, 2018   2035 Patient's blood sugar has now between between 103 and 175 the entire time he has been here patient has no complaints will sent him home                                  MDM  Number of Diagnoses or Management Options  Hypoglycemia: new and requires workup     Amount and/or Complexity of Data Reviewed  Clinical lab tests: ordered and reviewed  Tests in the medicine section of CPT®: ordered and reviewed    Patient Progress  Patient progress: stable    CritCare Time    Disposition  Final diagnoses:   Hypoglycemia     Time reflects when diagnosis was documented in both MDM as applicable and the Disposition within this note     Time User Action Codes Description Comment    9/3/2018  8:35 PM Mariajose Fox Add [E16 2] Hypoglycemia       ED Disposition     ED Disposition Condition Comment    Discharge  Rick Cifuentes discharge to home/self care  Condition at discharge: Good        Follow-up Information     Follow up With Specialties Details Why [address], DO Internal Medicine Schedule an appointment as soon as possible for a visit  [address] [place] [address],[address]  [address] [address]  [phone]            Discharge Medication List as of 9/3/2018  8:35 PM      CONTINUE these medications which have NOT CHANGED    Details   amLODIPine (NORVASC) 5 mg tablet Take 1 tablet (5 mg total) by mouth every 12 (twelve) hours, Starting Mon 8/13/2018, Normal      aspirin (ECOTRIN LOW STRENGTH) 81 mg EC tablet Take 81 mg by mouth daily Resume on 8/14, Historical Med      atorvastatin (LIPITOR) 80 mg tablet Take 1 tablet (80 mg total) by mouth daily with dinner, Starting Mon 8/13/2018, Normal      Blood Glucose Monitoring Suppl (ONE TOUCH ULTRA 2) w/Device KIT by Does not apply route 3 (three) times a day, Starting Tue 8/14/2018, Normal      Blood Pressure Monitoring (BLOOD PRESSURE CUFF) MISC Use to check blood pressure before taking blood pressure medication and 1 hour after and follow instructions provided in discharge instructions based on the readings  , Print      carvedilol (COREG) 6 25 mg tablet Take 1 tablet (6 25 mg total) by mouth 2 (two) times a day with meals, Starting Thu 8/16/2018, Normal      diphenoxylate-atropine (LOMOTIL) 2 5-0 025 mg per tablet Take 1 tablet by mouth 4 (four) times a day as needed for diarrhea, Historical Med      famotidine (PEPCID) 20 mg tablet Take 20 mg by mouth daily Resume on 8/14, Historical Med      gabapentin (NEURONTIN) 250 mg/5 mL solution Take 12 mL (600 mg total) by mouth daily at bedtime, Starting Fri 8/24/2018, Normal      glucose 4 g chewable tablet Chew 3 tablets (12 g total) as needed for low blood sugar, Starting Wed 8/15/2018, Normal      insulin aspart protamine-insulin aspart (NovoLOG 70/30) 100 units/mL injection Administer 40 U in AM and 38 U in evening or as directed, Historical Med      lactulose 20 g/30 mL Take 30 mL (20 g total) by mouth daily as needed (constipation), Starting Fri 8/17/2018, Normal      neomycin-bacitracin-polymyxin b (NEOSPORIN) ointment Apply 1 application topically 2 (two) times a day Apply twice per day to shin wound until fully healed  If not fully healed in 5 days contact your family doctor  Next application is the evening of 8/13, Historical Med           No discharge procedures on file      ED Provider  Electronically Signed by           Hakeem Carrillo DO  09/03/18 7624

## 2018-09-04 ENCOUNTER — OFFICE VISIT (OUTPATIENT)
Dept: OCCUPATIONAL THERAPY | Facility: CLINIC | Age: 58
End: 2018-09-04
Payer: COMMERCIAL

## 2018-09-04 ENCOUNTER — OFFICE VISIT (OUTPATIENT)
Dept: PHYSICAL THERAPY | Facility: CLINIC | Age: 58
End: 2018-09-04
Payer: COMMERCIAL

## 2018-09-04 DIAGNOSIS — I69.30 HISTORY OF ISCHEMIC CEREBROVASCULAR ACCIDENT (CVA) WITH RESIDUAL DEFICIT: Primary | ICD-10-CM

## 2018-09-04 DIAGNOSIS — Z74.09 IMPAIRED FUNCTIONAL MOBILITY, BALANCE, AND ENDURANCE: ICD-10-CM

## 2018-09-04 DIAGNOSIS — I63.9 CEREBROVASCULAR ACCIDENT (CVA), UNSPECIFIED MECHANISM (HCC): Primary | ICD-10-CM

## 2018-09-04 LAB
ATRIAL RATE: 73 BPM
P AXIS: 54 DEGREES
PR INTERVAL: 164 MS
QRS AXIS: 1 DEGREES
QRSD INTERVAL: 104 MS
QT INTERVAL: 394 MS
QTC INTERVAL: 434 MS
T WAVE AXIS: 30 DEGREES
VENTRICULAR RATE: 73 BPM

## 2018-09-04 PROCEDURE — 97110 THERAPEUTIC EXERCISES: CPT

## 2018-09-04 PROCEDURE — 97112 NEUROMUSCULAR REEDUCATION: CPT

## 2018-09-04 PROCEDURE — 97112 NEUROMUSCULAR REEDUCATION: CPT | Performed by: PHYSICAL THERAPIST

## 2018-09-04 PROCEDURE — 93010 ELECTROCARDIOGRAM REPORT: CPT | Performed by: INTERNAL MEDICINE

## 2018-09-04 PROCEDURE — 97110 THERAPEUTIC EXERCISES: CPT | Performed by: PHYSICAL THERAPIST

## 2018-09-04 NOTE — DISCHARGE INSTRUCTIONS
Hypoglycemia in a Person with Diabetes   WHAT YOU NEED TO KNOW:   Hypoglycemia is a serious condition that happens when your blood glucose (sugar) level drops too low  The blood sugar level is usually too high in a person with diabetes, but the level can also drop too low  It is important to follow your diabetes management plan to keep your blood sugar level steady  DISCHARGE INSTRUCTIONS:   Call 911 for any of the following:   · You feel you are going to pass out  · You have a seizure or pass out  · You have trouble thinking clearly  Seek care immediately if:  · Your blood sugar is less than 50 mg/dL and does not respond to treatment  Contact your healthcare provider if:   · You have had symptoms of low blood sugar several times  · You have questions about the amount of insulin or diabetes medicine you are taking  · You have questions or concerns about your condition or care  Medicines:   · Insulin or diabetes medicine  help to keep your blood sugar under control  · Glucagon  may be needed if you have severe hypoglycemia  · Take your medicine as directed  Contact your healthcare provider if you think your medicine is not helping or if you have side effects  Tell him or her if you are allergic to any medicine  Keep a list of the medicines, vitamins, and herbs you take  Include the amounts, and when and why you take them  Bring the list or the pill bottles to follow-up visits  Carry your medicine list with you in case of an emergency  Follow up with your healthcare provider or specialist as directed: You may need dose changes to your insulin or oral diabetes medicine if you have hypoglycemia  Write down your questions so you remember to ask them during your visits  Manage hypoglycemia:   · Check your blood sugar level right away if you have symptoms of hypoglycemia  Hypoglycemia is usually 70 mg/dL or below   Ask your healthcare provider what blood sugar level is too low for you     · If your blood sugar level is too low, eat or drink 15 grams of fast-acting carbohydrate  Examples of this amount of fast-acting carbohydrate are 4 ounces (½ cup) of fruit juice or 4 ounces of regular soda  Other examples are 2 tablespoons of raisins or 3 to 4 glucose tablets  Check your blood sugar level 15 minutes later  If the level is still low (less than 100 mg/dL), have another 15 grams of carbohydrate  When the level returns to 100 mg/dL, eat a snack or meal that contains carbohydrates  This will help prevent another drop in blood sugar  Always carefully follow your healthcare provider's instructions on how to treat low blood sugar levels  · Always carry a source of fast-acting carbohydrate  If you have symptoms of hypoglycemia and you do not have a blood glucose meter, have a source of fast-acting carbohydrate anyway  Avoid carbohydrate foods that are high in fat  The fat content may make it take longer to increase your blood sugar level  Ask your healthcare provider if you should carry a glucagon kit  Glucagon is a medicine that is injected when you develop severe hypoglycemia and become unconscious  Check the expiration date every month and replace it before it expires  · Teach others how to help you if you have symptoms of hypoglycemia  Tell them about the symptoms of hypoglycemia  Ask them to give you a source of fast-acting carbohydrate if you cannot get it yourself  Ask them to give you a glucagon injection if you have symptoms of hypoglycemia and you become unconscious or have a seizure  Ask them to call 911   This is an emergency  Tell them never to try to make you swallow anything if you faint or have a seizure  · Wear medical alert jewelry  or carry a card that says you have diabetes  Ask where to get these items  Prevent hypoglycemia:   · Take diabetes medicine as directed  Take your medicine at the right time and in the right amount   Your healthcare provider may change your blood sugar goals if you get hypoglycemia often  · Eat regular meals and snacks  Talk to your dietitian or healthcare provider about a meal plan that is right for you  Do not skip meals  · Check your blood sugar level as directed  Ask your healthcare provider what your blood sugar levels should be before and after you eat  Ask when and how often to check your blood sugar level  You may need to check at least 3 times each day  Record your blood sugar level results and take the record with you when you see your healthcare provider  Your provider may use the record to make changes to your medicine, food, or exercise schedules  · Check your blood sugar level before you exercise  Exercise can decrease your blood sugar level  If your blood sugar level is less than 100 mg/dL, have a carbohydrate snack  Examples are 4 to 6 crackers, ½ banana, 8 ounces (1 cup) of nonfat or 1% milk, or 4 ounces (½ cup) of juice  If you will exercise for more than 1 hour, you may need to check your blood sugar level every 30 minutes  Your healthcare provider may also recommend that you check your blood sugar level after exercise  · Be aware of how alcohol affects your blood sugar level  Alcohol can cause your blood sugar level to drop for up to 12 hours after drinking  Ask your healthcare provider if alcohol is safe for you  If you drink alcohol, always have a snack or meal at the same time  Women should limit alcohol to 1 drink a day  Men should limit alcohol to 2 drinks a day  A drink of alcohol is 12 ounces of beer, 5 ounces of wine, or 1½ ounces of liquor  © 2017 [address]  Information is for End User's use only and may not be sold, redistributed or otherwise used for commercial purposes  All illustrations and images included in CareNotes® are the copyrighted property of [employer] , [employer]  or Vahid Jacobo  The above information is an  only   It is not intended as medical advice for individual conditions or treatments  Talk to your doctor, nurse or pharmacist before following any medical regimen to see if it is safe and effective for you

## 2018-09-04 NOTE — PROGRESS NOTES
Daily Note     Today's date: 2018  Patient name: Valdemar Vincent  : 1960  MRN: [id number]  Referring provider: Brandon Frost MD  Dx:   Encounter Diagnosis     ICD-10-CM    1  Cerebrovascular accident (CVA), unspecified mechanism ([facility] [address] ) I63 9    2  Impaired functional mobility, balance, and endurance Z74 09                   Subjective: Pt has no new complaints  Objective: See treatment diary below    Precautions: FALL risk, DM II, HTN, spinal stimulator (no stim), CDK, poor safety awareness, *Fear of Dogs*     Daily Treatment Diary      Manual                                                                                                                                                      Exercise Diary          Nustep  L5, 10 min   L5, 6 min  L5, 5 min  L6, 10 min       STS  2 x 10   2 x 10   2 x 10  2 x 10        FAEO, firm  30" x 3  FTEO, firm 30" x 3  FAEC, firm 30" x 3 FAEC, firm 30" x 3       Ambulation with RW  3 laps in hallway  No AD   3 laps in hallway   No AD   3 laps in hallway         Semi tandem   30" x 2 30" x 2 ea  30" x 2  30"x 2       Sidestepping  2 laps     2 laps         Alternating step taps 2 x 15   4" step 2 x 10  6" step 2 x 10   6" step 2 x 10         FW/BBW ambulation in // bars 3 laps             Weaving in between cones 2 laps   2 laps, no AD  3 laps, no AD          Stepping over hurdles   Fw/lat 2 laps  unilateral // bars  Fw/lat 2 laps  unilateral // bars  solostep FW/lateral 3 laps                                                                                                                                                             Modalities                                                                                                        Assessment: Performed exercises in solostep today to decrease UE use, pt continues to demo impaired coordination and can impulsive   Provided constant cueing to perform exercises slowly and pt demo improved stability when he did perform them slowly  Challenged by stepping over hurdles, difficulty clearing hurdles and often circumducted LE instead of actually stepping over  Patient would benefit from continued PT      Plan: Progress treatment as tolerated

## 2018-09-04 NOTE — ED NOTES
Pt oob, gait steady with assist  Pt ambulatory to waiting room     [facility]  09/03/18 2053

## 2018-09-04 NOTE — PROGRESS NOTES
Occupational Therapy Daily Note:    Today's date: 2018  Patient name: Tommy Menjivar  : 1960  MRN: [id number]  Referring provider: Gabby Byers MD  Dx:   Encounter Diagnosis   Name Primary?  History of ischemic cerebrovascular accident (CVA) with residual deficit Yes     Subjective: "I wish I wasn't in the hospital yesterday"  Objective: See treatment diary below  Assessment: Pt seen for OT treatment session focusing on UEB 5 minute prograde 5 minute retrograde w/o resistance w/ fatigue noted, required rest break between sets  Pt tolerance NMES x10 minutes RUE for neuro modulation neuro stimulation  Pt tolerated IASTM to RUE for myofascial release  Pt engaged in 2# therabar supine overhead gross motor strengthening x10 reps x2 sets w/ max cues for appropriate body mechanics and body alignment for quality vs quantity movement w/ noted fatigue   Pt engaged in gross motor bean bag toss w/ RUE to theraweb targets in B/L peripheral fields x3 trials w/ improvement w/ mass practice, increased difficulty w/ increased distance of targets w/ each trial      Pt is currently demonstrating the following occupational deficits: limited 2* diplopia w/ binocular vision, R esophoria, L eye droop, L eye exophoric strabismus at rest, PLRG @ 12", no diplopia/fusion for point of convergence, misalignment, jerky pursuits, inaccurate saccades, confused, disoriented, impulsive, distractible, poor problem solving and sequencing, poor safety insight judgment and awareness into deficits, poor attention/concentration to task, requiring max cues to redirect and additional time for delayed processing, decreased STM/immediate delayed recall, decreased working memory, memory retention, dysarthria, facial droop, decreased EF skills, follows simple one step verbal concrete commands w/ max cues for carryover, difficulty w/ pacing/planning, R handed, macrographia, RUE hemiparesis w/ pronator drift w/ hypotonicity proximal>distal, apraxia, ataxia, dysmetria, dysdiadochokinesia, impaired FMC/FMS/GMC/GMS, impaired tactile/proprioceptive kinesthetic sensory input  Tolerated treatment well  Patient would benefit from continued skilled OT      Plan: Continued skilled OT per POC with focus on RUE coordination, strengthening, sensory re-training, memory retention, cog loading, working memory, insight, and awareness, and /VM re-training      INTERVENTION COMMENTS:  Diagnosis: L BG CVA  Precautions: fall risk, impulsive, /VM impairments  FOTO: 77 with 23% limitation  Insurance: [employer]  4 of 24 visits, PN due 9/17/2018     Thank you for the consult!   Please call if you have any questions: [phone]  Marylene Lemming, TRISTAN, OTR/L, C-GCJULISA, CSRS  Director of Outpatient Neuro Occupational Therapy

## 2018-09-05 DIAGNOSIS — I63.9 CEREBROVASCULAR ACCIDENT (CVA), UNSPECIFIED MECHANISM (HCC): Primary | ICD-10-CM

## 2018-09-05 NOTE — PROGRESS NOTES
Ariana Landry   Pt has an appt with Neurology in [place] on 9/11/18  Pt has Rodriguez Quijano which requires a physician order  Jacki Small you can please enter an order into [facility]  Diagnosis: I63 9     Thank you so much!    Blake New

## 2018-09-06 ENCOUNTER — OFFICE VISIT (OUTPATIENT)
Dept: SPEECH THERAPY | Facility: CLINIC | Age: 58
End: 2018-09-06
Payer: COMMERCIAL

## 2018-09-06 ENCOUNTER — APPOINTMENT (OUTPATIENT)
Dept: OCCUPATIONAL THERAPY | Facility: CLINIC | Age: 58
End: 2018-09-06
Payer: COMMERCIAL

## 2018-09-06 ENCOUNTER — APPOINTMENT (OUTPATIENT)
Dept: PHYSICAL THERAPY | Facility: CLINIC | Age: 58
End: 2018-09-06
Payer: COMMERCIAL

## 2018-09-06 DIAGNOSIS — I69.322 DYSARTHRIA AS LATE EFFECT OF CEREBELLAR CEREBROVASCULAR ACCIDENT (CVA): ICD-10-CM

## 2018-09-06 DIAGNOSIS — I63.9 ISCHEMIC CEREBROVASCULAR ACCIDENT (CVA) (HCC): ICD-10-CM

## 2018-09-06 DIAGNOSIS — R48.8 OTHER SYMBOLIC DYSFUNCTIONS: Primary | ICD-10-CM

## 2018-09-06 DIAGNOSIS — R41.89 COGNITIVE IMPAIRMENT: ICD-10-CM

## 2018-09-06 PROCEDURE — 92507 TX SP LANG VOICE COMM INDIV: CPT

## 2018-09-06 NOTE — PROGRESS NOTES
Daily Speech Treatment Note    Today's date: 2018  Patients name: Katie Sunshine  : 1960  MRN: [id number]  Safety measures: CVA   Referring provider: Beba Craven MD    Primary Diagnosis/Billing code: J34 7  Secondary Diagnosis/ Billing code: I63 9, I69 322     Visit Tracking:  -Referring provider: EPIC   -Billing guidelines: AMA  -Visit #   -[facility]  [facility]  ([facility]-no auth;BOMN    [facility]-auth post 24 visits)   -RE due 2018  Subjective/Behavioral:  Patient was 15 minutes late for his session today()  Objective/Assessment:  No family members were present during this session  His daughter provided transportation for his therapy  Short-term goals:    2  Patient will be educated on oral motor exercises and provided an appropriate at home exercise program to be practiced to facilitate improved strength and function for increased speech intelligibility  To be achieved in 4-6 weeks  3  Patient will be educated on word finding strategies (i e , circumlocution) for improved generative naming and verbal expression skills  4  Patient will name an average of 15+ items in a category in 60 seconds over 5 trials using compensatory strategies and min cues to facilitate improved word retrieval skills, to be achieved in 4-6 weeks  5  Patient will name an average of 10+ words that begin with a specific letter in 60 seconds over 5 trials using compensatory strategies and min cues to facilitate improved word retrieval skills, to be achieved in 4-6 weeks  When Hugo Painter was given the initial letter in [address], he was able to name four members in each of these categories with minimal verbal cues    6  Patient will complete concrete and abstract categorization tasks to 80% accuracy to facilitate improved generative naming skills and working memory, to be achieved in 4-6 weeks        7  Patient will complete deductive reasoning puzzles with 80% accuracy to facilitate increased working memory, problem solving, and processing skills, to be achieved in 4-6 weeks  8  Patient will complete thought organization tasks (e g , sequencing, deduction puzzles, etc ) with 80% accuracy to facilitate increased executive functioning skills, to be achieved in 4-6 weeks  Patient was able to sequence four items needed to complete a task in 4 opportunities  Plan:  -Patient was provided with home exercises/activities to target goals in plan of care at the end of today's session   -Discussed session and patient's progress with caregiver/family member after today's session   -Continue with current plan of care

## 2018-09-10 ENCOUNTER — OFFICE VISIT (OUTPATIENT)
Dept: OCCUPATIONAL THERAPY | Facility: CLINIC | Age: 58
End: 2018-09-10
Payer: COMMERCIAL

## 2018-09-10 ENCOUNTER — OFFICE VISIT (OUTPATIENT)
Dept: SPEECH THERAPY | Facility: CLINIC | Age: 58
End: 2018-09-10
Payer: COMMERCIAL

## 2018-09-10 ENCOUNTER — OFFICE VISIT (OUTPATIENT)
Dept: PHYSICAL THERAPY | Facility: CLINIC | Age: 58
End: 2018-09-10
Payer: COMMERCIAL

## 2018-09-10 DIAGNOSIS — I69.30 HISTORY OF ISCHEMIC CEREBROVASCULAR ACCIDENT (CVA) WITH RESIDUAL DEFICIT: ICD-10-CM

## 2018-09-10 DIAGNOSIS — I12.9 BENIGN HYPERTENSION WITH CKD (CHRONIC KIDNEY DISEASE) STAGE III (HCC): ICD-10-CM

## 2018-09-10 DIAGNOSIS — I69.30 HISTORY OF ISCHEMIC CEREBROVASCULAR ACCIDENT (CVA) WITH RESIDUAL DEFICIT: Primary | ICD-10-CM

## 2018-09-10 DIAGNOSIS — Z74.09 IMPAIRED FUNCTIONAL MOBILITY, BALANCE, AND ENDURANCE: ICD-10-CM

## 2018-09-10 DIAGNOSIS — I63.9 ISCHEMIC CEREBROVASCULAR ACCIDENT (CVA) (HCC): ICD-10-CM

## 2018-09-10 DIAGNOSIS — R48.8 OTHER SYMBOLIC DYSFUNCTIONS: Primary | ICD-10-CM

## 2018-09-10 DIAGNOSIS — E78.2 MIXED HYPERLIPIDEMIA: Primary | ICD-10-CM

## 2018-09-10 DIAGNOSIS — R41.89 COGNITIVE IMPAIRMENT: ICD-10-CM

## 2018-09-10 DIAGNOSIS — N18.30 BENIGN HYPERTENSION WITH CKD (CHRONIC KIDNEY DISEASE) STAGE III (HCC): ICD-10-CM

## 2018-09-10 DIAGNOSIS — N18.30 STAGE 3 CHRONIC KIDNEY DISEASE (HCC): ICD-10-CM

## 2018-09-10 DIAGNOSIS — I10 ESSENTIAL HYPERTENSION: Chronic | ICD-10-CM

## 2018-09-10 DIAGNOSIS — I63.9 CEREBROVASCULAR ACCIDENT (CVA), UNSPECIFIED MECHANISM (HCC): Primary | ICD-10-CM

## 2018-09-10 PROCEDURE — 97112 NEUROMUSCULAR REEDUCATION: CPT

## 2018-09-10 PROCEDURE — 92507 TX SP LANG VOICE COMM INDIV: CPT

## 2018-09-10 PROCEDURE — 97140 MANUAL THERAPY 1/> REGIONS: CPT

## 2018-09-10 PROCEDURE — 97530 THERAPEUTIC ACTIVITIES: CPT | Performed by: PHYSICAL THERAPIST

## 2018-09-10 PROCEDURE — 97110 THERAPEUTIC EXERCISES: CPT

## 2018-09-10 PROCEDURE — 97112 NEUROMUSCULAR REEDUCATION: CPT | Performed by: PHYSICAL THERAPIST

## 2018-09-10 RX ORDER — ATORVASTATIN CALCIUM 80 MG/1
80 TABLET, FILM COATED ORAL
Qty: 30 TABLET | Refills: 3 | Status: SHIPPED | OUTPATIENT
Start: 2018-09-10 | End: 2018-12-20 | Stop reason: SDUPTHER

## 2018-09-10 RX ORDER — AMLODIPINE BESYLATE 5 MG/1
5 TABLET ORAL EVERY 12 HOURS
Qty: 60 TABLET | Refills: 3 | Status: SHIPPED | OUTPATIENT
Start: 2018-09-10 | End: 2018-10-15 | Stop reason: SDUPTHER

## 2018-09-10 NOTE — PROGRESS NOTES
Daily Note     Today's date: 9/10/2018  Patient name: Maria C Wood  : 1960  MRN: [id number]  Referring provider: Morales Spence MD  Dx:   Encounter Diagnosis     ICD-10-CM    1  Cerebrovascular accident (CVA), unspecified mechanism ([facility] [address] ) I63 9    2  Impaired functional mobility, balance, and endurance Z74 09                   Subjective: Pt feeling tired today  Denies any falls but wife has to be with him for safety  Wife reports that she is very fearful of him falling and he came into kitchen the other day without RW looking extremely unsteady         Objective: See treatment diary below  Precautions: FALL risk, DM II, HTN, spinal stimulator (no stim), CDK, poor safety awareness, *Fear of Dogs*     Daily Treatment Diary      Manual                                                                                                                                                      Exercise Diary   8/21  8/27  8/30  9/4  9/10     Nustep  L5, 10 min   L5, 6 min  L5, 5 min  L6, 10 min L6, 5 min       STS  2 x 10   2 x 10   2 x 10  2 x 10   2 x 10      Static balance  FAEO, firm  30" x 3  FTEO, firm 30" x 3  FAEC, firm 30" x 3 FAEC, firm 30" x 3 FAEC, firm 30" x 3    FTEO with HT/HN firm 30" x 2     Ambulation with RW  3 laps in hallway  No AD   3 laps in hallway   No AD   3 laps in hallway   Solostep  FW/BW, no AD  2 laps     Semi tandem   30" x 2 30" x 2 ea  30" x 2  30"x 2       Sidestepping  2 laps     2 laps    solostep 2 laps     Alternating step taps 2 x 15   4" step 2 x 10  6" step 2 x 10   6" step 2 x 10         FW/BBW ambulation in // bars 3 laps       solostep  2 laps      Weaving in between cones 2 laps   2 laps, no AD  3 laps, no AD          Stepping over hurdles   Fw/lat 2 laps  unilateral // bars  Fw/lat 2 laps  unilateral // bars  solostep FW/lateral 3 laps solostep FW/lateral 3 laps      Marching         Solo  2 laps     Step ups         Solo  6" FW/Ant 10 x 2                                                                                                                           Modalities                                                                                                               Assessment: Performed majority of exercises in solostep this session  Pt continues to be very impulsive and demo poor safety awareness which causes increased instability, able to demo improved balance when performed exercises slowly but pt was unable to maintain slower pace  Unable to clear multiple hurdles while sidestepping so decreased complexity of task to perform stepping over just one alexander instead which pt responded well to  Pt performed better when given less reps and constant verbal cues to perform slowly  He is easily frustrated and discouraged so recommended to perform deep breathing exercises  Reinforced the importance of safety and use of RW with pt  Patient would benefit from continued PT   Plan: Progress treatment as tolerated

## 2018-09-10 NOTE — PROGRESS NOTES
Daily Note     Today's date: 9/10/2018  Patient name: Alexei Cabrera  : 1960  MRN: [id number]  Referring provider: Bello Rojas MD  Dx:   Encounter Diagnosis   Name Primary?  History of ischemic cerebrovascular accident (CVA) with residual deficit Yes                  Subjective: "I did nothing today "      Objective: See treatment below  UEB for 5 minutes prograde and 5 minutes retrograde at 1 5 resistance  BIONESS and MH for 10 minutes on neuro mod  IASTM to forearm, palm, and digits  Supine neuro re-ed with use of 2# weighted bar  Seated functional task with tongs and foam blocks tfor sustained grasp  Assessment: Tolerated treatment fair  Easily distractible and required frequent cues to attend to right side  Poor sustained hand grasp on UEB   Verbal and tactile cues for elbow extension during supine neuro re-ed      Plan: Continued skilled OT per POC     INTERVENTION COMMENTS:  Diagnosis: History of ischemic cerebrovascular accident (CVA) with residual deficit [I69 30]  Precautions:  fall risk, impulsive, /VM impairments  FOTO:  5 of 24 visits, PN due

## 2018-09-10 NOTE — PROGRESS NOTES
Daily Speech Treatment Note    Today's date: 9/10/2018  Patients name: Daniel Vaughn  : 1960  MRN: [id number]  Safety measures: CVA   Referring provider: Zay Puente MD    Primary Diagnosis/Billing code: Q37 0  Secondary Diagnosis/ Billing code: I63 9, I69 322     Visit Tracking:  -Referring provider: EPIC   -Billing guidelines: AMA  -Visit #   -[facility]  [facility]  ([facility]-no auth;BOMN    [facility]-auth post 24 visits)   -RE due 2018  Subjective/Behavioral:    "What, I have more therapy after this?" Pt requesting to end session early and continue to PT so he can be finished early 2/2 the fact he could not sleep past 1am last night  Pt yawning and with reduced concentration during session  Pt was let out 10 minutes early  Objective/Assessment:  No family members were present during this session  Forgot HEP folder today  Short-term goals:    2  Patient will be educated on oral motor exercises and provided an appropriate at home exercise program to be practiced to facilitate improved strength and function for increased speech intelligibility  To be achieved in 4-6 weeks  3  Patient will be educated on word finding strategies (i e , circumlocution) for improved generative naming and verbal expression skills    -Pt was re-educated on word finding strategy (circumlocution) and reiterated its use and importance in conversational speech  Pt verbally understand, but unsure of carryover  To practice using circumlocution, pt used [employer] aphasia tosin  Pt given picture (i e  [place]) and asked to describe it  Task completed in 10/10 opp with mod verbal cues ("what color is it?, what does it look like? Where do you find it?") to facilitate and increase usage of strategy       4  Patient will name an average of 15+ items in a category in 60 seconds over 5 trials using compensatory strategies and min cues to facilitate improved word retrieval skills, to be achieved in 4-6 weeks  To target word retrieval skills, pt will be provided a category (i e  "foods") and asked to name an average of 15+ words in 1 min  3 trials were completed with avg of 7 5 words per minute  Noted pt to use gestures during task to improve word finding  5  Patient will name an average of 10+ words that begin with a specific letter in 60 seconds over 5 trials using compensatory strategies and min cues to facilitate improved word retrieval skills, to be achieved in 4-6 weeks  6  Patient will complete concrete and abstract categorization tasks to 80% accuracy to facilitate improved generative naming skills and working memory, to be achieved in 4-6 weeks   -To target word finding, pt used Language Tactus Therapy aphasia tosin to complete generative naming task  Task completed in 16/24 opp independently  Description cues=1; first letter=3; phrase completion=1     -Pt provided pictures and asked to write name of picture below  Task completed in 11/20 opp, increasing to 20/20 opp with mod verbal phonemic and semantic cues  -Pt was provided visually with 4 words and then is to answer a question about words-Category Inclusion  Task completed in 6/10 opp independently, increasing to 10/10 with min verbal cues  7  Patient will complete deductive reasoning puzzles with 80% accuracy to facilitate increased working memory, problem solving, and processing skills, to be achieved in 4-6 weeks  8  Patient will complete thought organization tasks (e g , sequencing, deduction puzzles, etc ) with 80% accuracy to facilitate increased executive functioning skills, to be achieved in 4-6 weeks    Plan:   -Patient was provided with home exercises/activities to target goals in plan of care at the end of today's session   -Discussed session and patient's progress with caregiver/family member after today's session   -Continue with current plan of care

## 2018-09-12 ENCOUNTER — OFFICE VISIT (OUTPATIENT)
Dept: NEUROLOGY | Facility: CLINIC | Age: 58
End: 2018-09-12
Payer: COMMERCIAL

## 2018-09-12 VITALS
SYSTOLIC BLOOD PRESSURE: 110 MMHG | HEIGHT: 68 IN | WEIGHT: 241.5 LBS | HEART RATE: 77 BPM | BODY MASS INDEX: 36.6 KG/M2 | DIASTOLIC BLOOD PRESSURE: 60 MMHG | RESPIRATION RATE: 14 BRPM

## 2018-09-12 DIAGNOSIS — R41.89 COGNITIVE IMPAIRMENT: ICD-10-CM

## 2018-09-12 DIAGNOSIS — I63.9 CEREBROVASCULAR ACCIDENT (CVA), UNSPECIFIED MECHANISM (HCC): ICD-10-CM

## 2018-09-12 DIAGNOSIS — I69.30 HISTORY OF ISCHEMIC CEREBROVASCULAR ACCIDENT (CVA) WITH RESIDUAL DEFICIT: Primary | ICD-10-CM

## 2018-09-12 PROCEDURE — 99205 OFFICE O/P NEW HI 60 MIN: CPT | Performed by: PHYSICAL MEDICINE & REHABILITATION

## 2018-09-12 NOTE — PROGRESS NOTES
Physical Medicine & Rehabilitation New Patient Evaluation  Katie Sunshine 62 y o  male      ASSESSMENT/PLAN:     Patient is seen today approximately 1 month following his discharge from acute inpatient rehabilitation at [facility] status post a left internal capsule, thalamus and hypothalamus ischemic stroke  Functional deficits include right nancy paresis, right coordination and balance deficits, left cranial nerve 3 palsy improving, moderate cognitive deficits, aphasia  Patient is progressing in rehabilitation currently in an outpatient program neuro rehabilitation here at [address] [place] [address] and he is in PT, OT and SLP  He is progressing as expected and continues to require Min A overall  Him and his wife were counseled on stroke functional recovery process  I do expect to see more functional recovery over the next 3-6+ months  Patient does state that his mood is down his wife also reports he cries on occasion  We discussed an SSRI for stroke related depression, patient is considering this and would like to talk to his family further  I have also discussed some patient on SSRI have improved motor function based on the FLAME trial   We also briefly discussed referral to neuropsychology, for adjustment and psychotherapy which patient would also like to consider  I have also encouraged referred for him to come to [address] [place] [address]'s stroke support group [facility]  Diagnoses and all orders for this visit:    History of ischemic cerebrovascular accident (CVA) with residual deficit  - continue outpatient PT, OT, SLP    Cerebrovascular accident (CVA), unspecified mechanism ([address] )  -     Ambulatory referral to Neurology    Cognitive impairment  -continue outpatient SLP    Bladder incontinence:  Related to neurogenic bladder  -recommended timed toileting to improve continence  -it may also be helpful to obtain a urinal as patient's mobility does limit his ability to get to the bathroom at in time    Training with urinal usage can occur in outpatient occupational therapy furthermore  Follow-up in 4-6 (patient preference to discuss a potential antidepressant again)      *I have spent 60 minutes with Patient and family today in which greater than 50% of this time was spent in counseling/coordination of care regarding Intructions for management, Patient and family education and Impressions  HPI:   Galdino Bond 62 y o  male right handed, with  has a past medical history of Diabetes mellitus ([address] ); Hypercholesteremia; Hyperlipidemia; Hypertension; Neuropathy; Obesity; Osteomyelitis ([address] ); and PVC's (premature ventricular contractions)  Old records were reviewed personally  Patient is originally from [place] and speaks both Green[place]ic and [place]  Patient sustained CVA on 7/29/2018  Recevied IV tPA  Admitted to the [facility] from 8/2/18 - 8/13/18  Patient was discharged home in the care of his family  During his stay at the [facility] patient had worsening renal function with his new creatinine baseline 2 2 - 2 4  Patient does follow with [facility]'s Nephrology  Patient is continued currently in outpatient PT, OT, SLP    SUBJECTIVE:  Patient presents today in the office with chief complaint of stroke and functional deficits  He is accompanied today by his wife  He has seen ambulating into the office with a rolling walker requiring minimal assist given by his wife  Has trouble lifting right arm and right leg  LEFT Eyelid is still unable to completely open  Patient also having some spasms in all of his extremities  No headaches  No numbness or tingling  Patient is eating 3 meals/day 100% - Patient has dentures but does not wear them and gum chews  Sleeping 8-10 , Naps during the day  Bladder - incontinent     Expanded Social History:  Patient lives with family member in a 3 story house with 3 steps to enter  First floor set-up  Patient is employed     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  Driving: No      Function: Current Level of Function:   Supervision - Min A with RW for transfers  Ambulation Min A with RW ambulation - up to 150-300feet  Dressing LB Min-Mod A   Bathing tub bath - Min- Mod A   Toileting Supervision - can wipe himself   Safety awareness poor   Language +Aphasia     Prior to level of function:  [place]       Review of Systems:     Review of Systems   HENT: Negative  Eyes: Negative  Respiratory: Negative  Cardiovascular: Negative  Gastrointestinal: Negative  Endocrine: Negative  Genitourinary: Negative  Musculoskeletal: Negative  Skin: Negative  Allergic/Immunologic: Negative  Neurological: Positive for weakness  Hematological: Negative  Psychiatric/Behavioral: Negative          OBJECTIVE:   /60 (BP Location: Right arm, Patient Position: Sitting, Cuff Size: Standard)   Pulse 77   Resp 14   Ht 5' 8" (1 727 m)   Wt 110 kg (241 lb 8 oz)   BMI 36 72 kg/m²      Physical Exam  Physical Exam   Constitutional: He appears well-developed and well-nourished  HENT:   Head: Normocephalic and atraumatic  Eyes: EOM are normal  Pupils are equal, round, and reactive to light  Cardiovascular: Normal rate and regular rhythm  Pulmonary/Chest: Breath sounds normal  He has no wheezes  He has no rales  Abdominal: Soft  Bowel sounds are normal  He exhibits no distension  There is no tenderness  Musculoskeletal:   During today's exam - little to no increased muscle tone   Skin: Skin is warm  Psychiatric: He has a normal mood and affect  Nursing note and vitals reviewed  Neuro:  AAOx2  Patient impulsive at times  Attention is fair  Recall is 1/3  Naming/repetition is fair  Mild dysarthria  LEFT CN3 ptosis present, EOMI, PERRL  Unable to single leg stance   Gait: hemiparetic gait pattern  Motor Exam: Right pronator drift   Right Left Site Right Left Site   4-  S Ab:  Shoulder Abductors 4-  HF:  Hip Flexors   4  EF:  Elbow Flexors 4  H Ab:   Hip Abductors   4  EE: Elbow Extensors 4  KF: Knee Flexors   4  WE:  Wrist Extensors 4  KE:  Knee Extensors   4-  FF:  Finger Flexors 4-  DR:  Dorsi Flexors   4-  HI:  Hand Intrinsics 4-  EHL: Ext [place]   4-   4  PF:  Plantar Flexors       Imaging: I personally reviewed pertinent imaging  Past Medical History:   Diagnosis Date    Diabetes mellitus [facility] )     Hypercholesteremia     Hyperlipidemia     Hypertension     Neuropathy     Obesity     Osteomyelitis ([facility] )     last assessed 11/4/16    PVC's (premature ventricular contractions)     sees cardiology Dr Laura camargo       Patient Active Problem List    Diagnosis Date Noted    Other constipation 08/17/2018    GERD (gastroesophageal reflux disease) 08/13/2018    Diabetic macular edema ([facility] ) 08/09/2018    Cognitive impairment 08/03/2018    Elevated alkaline phosphatase level 08/03/2018    CKD (chronic kidney disease) 08/03/2018    History of ischemic cerebrovascular accident (CVA) with residual deficit 07/29/2018    Benign hypertension with CKD (chronic kidney disease) stage III 10/25/2016    Cirrhosis ([facility] ) 08/17/2016    Type 2 diabetes mellitus with kidney complication, with long-term current use of insulin ([facility] ) 02/26/2016    Mixed hyperlipidemia 02/26/2016    Diabetic polyneuropathy associated with type 2 diabetes mellitus ([facility] ) 01/26/2016       Past Surgical History:   Procedure Laterality Date    ABDOMINAL SURGERY      CHOLECYSTECTOMY      Percutaneous    OTHER SURGICAL HISTORY      "stimulator to control bowel movements"    OK ESOPHAGOGASTRODUODENOSCOPY TRANSORAL DIAGNOSTIC N/A 9/27/2016    Procedure: ESOPHAGOGASTRODUODENOSCOPY (EGD); Surgeon: Gary Caba MD;  [facility]: [facility];   Service: Gastroenterology    OK LAP,CHOLECYSTECTOMY N/A 2/29/2016    Procedure: LAPAROSCOPIC CHOLECYSTECTOMY ;  Surgeon: Asmita Caldera DO;  [facility]: [facility];  Service: General    ROTATOR CUFF REPAIR Right     TOE AMPUTATION Right 10/28/2016 Procedure: 3RD TOE AMPUTATION ;  Surgeon: Gerardo Estrada DPM;  [facility];  Service:        Family History   Problem Relation Age of Onset    Leukemia Mother     Liver disease Mother     Lung cancer Mother         heavy smoker - 3 ppd    Heart disease Father     Liver disease Father     Multiple myeloma Sister     Breast cancer Sister     Urolithiasis Family     Alcohol abuse Neg Hx     Depression Neg Hx     Drug abuse Neg Hx     Substance Abuse Neg Hx        Social History     Allergies   Allergen Reactions    Penicillins Other (See Comments) and Hives     Rash:jose miguel augmentin in past  Rash:jose miguel augmentin in past         Current Outpatient Prescriptions:     amLODIPine (NORVASC) 5 mg tablet, Take 1 tablet (5 mg total) by mouth every 12 (twelve) hours, Disp: 60 tablet, Rfl: 3    aspirin (ECOTRIN LOW STRENGTH) 81 mg EC tablet, Take 81 mg by mouth daily Resume on 8/14, Disp: , Rfl:     atorvastatin (LIPITOR) 80 mg tablet, Take 1 tablet (80 mg total) by mouth daily with dinner, Disp: 30 tablet, Rfl: 3    Blood Glucose Monitoring Suppl (ONE TOUCH ULTRA 2) w/Device KIT, by Does not apply route 3 (three) times a day, Disp: 1 each, Rfl: 0    Blood Pressure Monitoring (BLOOD PRESSURE CUFF) MISC, Use to check blood pressure before taking blood pressure medication and 1 hour after and follow instructions provided in discharge instructions based on the readings  , Disp: 1 each, Rfl: 0    carvedilol (COREG) 6 25 mg tablet, Take 1 tablet (6 25 mg total) by mouth 2 (two) times a day with meals, Disp: 60 tablet, Rfl: 3    diphenoxylate-atropine (LOMOTIL) 2 5-0 025 mg per tablet, Take 1 tablet by mouth 4 (four) times a day as needed for diarrhea, Disp: , Rfl:     famotidine (PEPCID) 20 mg tablet, Take 20 mg by mouth daily Resume on 8/14, Disp: , Rfl:     gabapentin (NEURONTIN) 250 mg/5 mL solution, Take 12 mL (600 mg total) by mouth daily at bedtime, Disp: 470 mL, Rfl: 2    glucose 4 g chewable tablet, Chew 3 tablets (12 g total) as needed for low blood sugar, Disp: 50 tablet, Rfl: 0    insulin aspart protamine-insulin aspart (NovoLOG 70/30) 100 units/mL injection, Administer 40 U in AM and 38 U in evening or as directed, Disp: 10 mL, Rfl: 0    lactulose 20 g/30 mL, Take 30 mL (20 g total) by mouth daily as needed (constipation), Disp: 473 mL, Rfl: 0    neomycin-bacitracin-polymyxin b (NEOSPORIN) ointment, Apply 1 application topically 2 (two) times a day Apply twice per day to shin wound until fully healed  If not fully healed in 5 days contact your family doctor   Next application is the evening of 8/13, Disp: , Rfl:

## 2018-09-13 ENCOUNTER — OFFICE VISIT (OUTPATIENT)
Dept: PHYSICAL THERAPY | Facility: CLINIC | Age: 58
End: 2018-09-13
Payer: COMMERCIAL

## 2018-09-13 ENCOUNTER — OFFICE VISIT (OUTPATIENT)
Dept: OCCUPATIONAL THERAPY | Facility: CLINIC | Age: 58
End: 2018-09-13
Payer: COMMERCIAL

## 2018-09-13 ENCOUNTER — OFFICE VISIT (OUTPATIENT)
Dept: SPEECH THERAPY | Facility: CLINIC | Age: 58
End: 2018-09-13
Payer: COMMERCIAL

## 2018-09-13 DIAGNOSIS — I63.9 CEREBROVASCULAR ACCIDENT (CVA), UNSPECIFIED MECHANISM (HCC): Primary | ICD-10-CM

## 2018-09-13 DIAGNOSIS — R48.8 OTHER SYMBOLIC DYSFUNCTIONS: Primary | ICD-10-CM

## 2018-09-13 DIAGNOSIS — I63.9 ISCHEMIC CEREBROVASCULAR ACCIDENT (CVA) (HCC): ICD-10-CM

## 2018-09-13 DIAGNOSIS — I69.30 HISTORY OF ISCHEMIC CEREBROVASCULAR ACCIDENT (CVA) WITH RESIDUAL DEFICIT: Primary | ICD-10-CM

## 2018-09-13 DIAGNOSIS — Z74.09 IMPAIRED FUNCTIONAL MOBILITY, BALANCE, AND ENDURANCE: ICD-10-CM

## 2018-09-13 DIAGNOSIS — R41.89 COGNITIVE IMPAIRMENT: ICD-10-CM

## 2018-09-13 PROCEDURE — 97112 NEUROMUSCULAR REEDUCATION: CPT | Performed by: PHYSICAL THERAPIST

## 2018-09-13 PROCEDURE — 97110 THERAPEUTIC EXERCISES: CPT | Performed by: PHYSICAL THERAPIST

## 2018-09-13 PROCEDURE — 97112 NEUROMUSCULAR REEDUCATION: CPT

## 2018-09-13 PROCEDURE — 92507 TX SP LANG VOICE COMM INDIV: CPT

## 2018-09-13 NOTE — PROGRESS NOTES
Daily Note     Today's date: 2018  Patient name: Bell Avila  : 1960  MRN: [id number]  Referring provider: Majel Mohs, MD  Dx:   Encounter Diagnosis     ICD-10-CM    1  Cerebrovascular accident (CVA), unspecified mechanism ([facility] ) I63 9    2  Impaired functional mobility, balance, and endurance Z74 09                   Subjective: Pt has no new complaints         Objective: See treatment diary below    Precautions: FALL risk, DM II, HTN, spinal stimulator (no stim), CDK, poor safety awareness, *Fear of Dogs*     Daily Treatment Diary      Manual                                                                                                                                                      Exercise Diary   8/21  8/27  8/30  9/4  9/10  9/13   Nustep  L5, 10 min   L5, 6 min  L5, 5 min  L6, 10 min L6, 5 min    L6, 10 min    STS  2 x 10   2 x 10   2 x 10  2 x 10   2 x 10      Static balance  FAEO, firm  30" x 3  FTEO, firm 30" x 3  FAEC, firm 30" x 3 FAEC, firm 30" x 3 FAEC, firm 30" x 3     FTEO with HT/HN firm 30" x 2     Ambulation  w/ RW   3 laps in hallway  No AD   3 laps in hallway   No AD   3 laps in hallway   Solostep  FW/BW, no AD  2 laps Solo  FW/BW 2 laps    Semi tandem   30" x 2 30" x 2 ea  30" x 2  30"x 2       Sidestepping  2 laps     2 laps    solostep 2 laps     Alternating step taps 2 x 15   4" step 2 x 10  6" step 2 x 10   6" step 2 x 10     8" 20x    Weaving in between cones 2 laps   2 laps, no AD  3 laps, no AD      solo 3 laps    Stepping over hurdles   Fw/lat 2 laps  unilateral // bars  Fw/lat 2 laps  unilateral // bars  solostep FW/lateral 3 laps solostep FW/lateral 3 laps  solostep FW/lateral 3 laps    Marching         Solo  2 laps     Step ups         Solo  6" FW/Ant 10 x 2 Solo  Step ups   6" FW 10 x 2  Step ups and over 15x                                                                                                                         Modalities                                                                                                      Assessment: Continued with all exercises in solo this session  Pt continues to be very impulsive which causes him to lose his balance, provided constant cueing to perform exercises slow and controlled  Also demo poor safety awareness, often leaves RW behind during ambulation to destination  Overall performed exercises slower and demo  improved balance this session compared to last session  Patient would benefit from continued PT      Plan: Progress treatment as tolerated

## 2018-09-13 NOTE — PROGRESS NOTES
Daily Speech Treatment Note    Today's date: 2018  Patients name: Stephania Ortiz  : 1960  MRN: [id number]  Safety measures: CVA   Referring provider: Jaspal Lopez MD    Primary Diagnosis/Billing code: P96 8  Secondary Diagnosis/ Billing code: I63 9, I69 322     Visit Tracking:  -Referring provider: EPIC   -Billing guidelines: AMA  -Visit #   -[facility]  (Marium-no auth;BOMN    [facility]-auth post 24 visits)   -RE due 2018  Subjective/Behavioral:    "I have been practicing my exercises at home " Pt responded when asked if he practices his dysarthria strength and coordination exercises  Pt still with slurred speech, but improving  Objective/Assessment:  No family members were present during this session  Forgot HEP folder today  Short-term goals:    2  Patient will be educated on oral motor exercises and provided an appropriate at home exercise program to be practiced to facilitate improved strength and function for increased speech intelligibility  To be achieved in 4-6 weeks  3  Patient will be educated on word finding strategies (i e , circumlocution) for improved generative naming and verbal expression skills  Pt was given initial word and asked to provide 10 associating words  Task completed over 3 trials, in  opp independently, increasing to 30/30 with mod verbal semantic cues  Pt was provided with F:16 pictures and asked to [place] 10 pictures that begin with the letter S  Task completed in 8/10 opp independently  Demonstrated difficulty with naming 2x  4  Patient will name an average of 15+ items in a category in 60 seconds over 5 trials using compensatory strategies and min cues to facilitate improved word retrieval skills, to be achieved in 4-6 weeks        5  Patient will name an average of 10+ words that begin with a specific letter in 60 seconds over 5 trials using compensatory strategies and min cues to facilitate improved word retrieval skills, to be achieved in 4-6 weeks  To target generative naming, pt named 40/60 pictures independently, increasing to 60/60 with phonemic and semantic verbal cues  6  Patient will complete concrete and abstract categorization tasks to 80% accuracy to facilitate improved generative naming skills and working memory, to be achieved in 4-6 weeks  7  Patient will complete deductive reasoning puzzles with 80% accuracy to facilitate increased working memory, problem solving, and processing skills, to be achieved in 4-6 weeks  Pt will be read aloud 4-5 items, practicing word list retention; then asked a category exlusion question (i e  "which ones are not drinks")  Task completed in 6/10 opp independently with use of repetition  8  Patient will complete thought organization tasks (e g , sequencing, deduction puzzles, etc ) with 80% accuracy to facilitate increased executive functioning skills, to be achieved in 4-6 weeks  Pt will take 10 cut out pictures and put into alphabetical order  Task completed independently in 8/10 opp independently; improvement from previous sessions  Increased to 10/10 with min verbal cues  To improve comprehension of words in sentences, pt used [employer] for comprehension sentence completion  Pt given sentence with blank and was provided with choices in F:4  Completed in 6/10 independently  Plan:   -Patient was provided with home exercises/activities to target goals in plan of care at the end of today's session   -Discussed session and patient's progress with caregiver/family member after today's session   -Continue with current plan of care

## 2018-09-17 ENCOUNTER — OFFICE VISIT (OUTPATIENT)
Dept: PHYSICAL THERAPY | Facility: CLINIC | Age: 58
End: 2018-09-17
Payer: COMMERCIAL

## 2018-09-17 ENCOUNTER — OFFICE VISIT (OUTPATIENT)
Dept: SPEECH THERAPY | Facility: CLINIC | Age: 58
End: 2018-09-17
Payer: COMMERCIAL

## 2018-09-17 ENCOUNTER — TELEPHONE (OUTPATIENT)
Dept: INTERNAL MEDICINE CLINIC | Facility: CLINIC | Age: 58
End: 2018-09-17

## 2018-09-17 ENCOUNTER — OFFICE VISIT (OUTPATIENT)
Dept: OCCUPATIONAL THERAPY | Facility: CLINIC | Age: 58
End: 2018-09-17
Payer: COMMERCIAL

## 2018-09-17 DIAGNOSIS — E11.22 TYPE 2 DIABETES MELLITUS WITH STAGE 3 CHRONIC KIDNEY DISEASE, WITH LONG-TERM CURRENT USE OF INSULIN (HCC): Primary | ICD-10-CM

## 2018-09-17 DIAGNOSIS — I63.9 CEREBROVASCULAR ACCIDENT (CVA), UNSPECIFIED MECHANISM (HCC): Primary | ICD-10-CM

## 2018-09-17 DIAGNOSIS — N18.30 TYPE 2 DIABETES MELLITUS WITH STAGE 3 CHRONIC KIDNEY DISEASE, WITH LONG-TERM CURRENT USE OF INSULIN (HCC): Primary | ICD-10-CM

## 2018-09-17 DIAGNOSIS — R41.89 COGNITIVE IMPAIRMENT: ICD-10-CM

## 2018-09-17 DIAGNOSIS — Z74.09 IMPAIRED FUNCTIONAL MOBILITY, BALANCE, AND ENDURANCE: ICD-10-CM

## 2018-09-17 DIAGNOSIS — R48.8 OTHER SYMBOLIC DYSFUNCTIONS: Primary | ICD-10-CM

## 2018-09-17 DIAGNOSIS — I63.9 ISCHEMIC CEREBROVASCULAR ACCIDENT (CVA) (HCC): ICD-10-CM

## 2018-09-17 DIAGNOSIS — I69.30 HISTORY OF ISCHEMIC CEREBROVASCULAR ACCIDENT (CVA) WITH RESIDUAL DEFICIT: Primary | ICD-10-CM

## 2018-09-17 DIAGNOSIS — Z79.4 TYPE 2 DIABETES MELLITUS WITH STAGE 3 CHRONIC KIDNEY DISEASE, WITH LONG-TERM CURRENT USE OF INSULIN (HCC): Primary | ICD-10-CM

## 2018-09-17 PROCEDURE — 92507 TX SP LANG VOICE COMM INDIV: CPT

## 2018-09-17 PROCEDURE — 97112 NEUROMUSCULAR REEDUCATION: CPT

## 2018-09-17 PROCEDURE — 97140 MANUAL THERAPY 1/> REGIONS: CPT

## 2018-09-17 PROCEDURE — 97110 THERAPEUTIC EXERCISES: CPT

## 2018-09-17 NOTE — PROGRESS NOTES
Daily Speech Treatment Note    Today's date: 2018  Patients name: Alicia Platt  : 1960  MRN: [id number]  Safety measures: CVA   Referring provider: Catrachito Mata MD    Primary Diagnosis/Billing code: L28 7  Secondary Diagnosis/ Billing code: I63 9, I69 322     Visit Tracking:  -Referring provider: EPIC   -Billing guidelines: AMA  -Visit #   -Mattie Simon  [facility]  (Mar[facility]-no auth;BOMN    [facility]-auth post 24 visits)   -RE due 2018  Subjective/Behavioral:    "I'm so tired from all this therapy "     Objective/Assessment:  Wife present during session today  Forgot HEP folder again  Pt reports he does not complete his exercises at home  Discussed importance for carryover besides just participating during sessions  Pt expressed verbal comprehension, however unsure if pt will comply  Short-term goals:    2  Patient will be educated on oral motor exercises and provided an appropriate at home exercise program to be practiced to facilitate improved strength and function for increased speech intelligibility  To be achieved in 4-6 weeks  3  Patient will be educated on word finding strategies (i e , circumlocution) for improved generative naming and verbal expression skills  Pt was provided with F:16 pictures and asked to [place] 10 pictures that begin with the letter M  Task completed in 9/10 opp independently  Demonstrated difficulty with naming 1x  Named 13/16 pictures independently, increasing to 14/16 with min phonemic and semantic cues  4  Patient will name an average of 15+ items in a category in 60 seconds over 5 trials using compensatory strategies and min cues to facilitate improved word retrieval skills, to be achieved in 4-6 weeks  5  Patient will name an average of 10+ words that begin with a specific letter in 60 seconds over 5 trials using compensatory strategies and min cues to facilitate improved word retrieval skills, to be achieved in 4-6 weeks    Reviewed compensatory strategies to imrpove word retrieval skills in an organized manner during task  Trialed 3x  Avg of 3 items today  Pt to find task difficult, stating "that's it" frequently  6  Patient will complete concrete and abstract categorization tasks to 80% accuracy to facilitate improved generative naming skills and working memory, to be achieved in 4-6 weeks  To improve generative naming skills, pt was given a concrete category and asked to provide 3 members  Trialed 12x  Able to provide items in 28/36 independently, increasing to 34/36 with mod verbal semantic cues  7  Patient will complete deductive reasoning puzzles with 80% accuracy to facilitate increased working memory, problem solving, and processing skills, to be achieved in 4-6 weeks  8  Patient will complete thought organization tasks (e g , sequencing, deduction puzzles, etc ) with 80% accuracy to facilitate increased executive functioning skills, to be achieved in 4-6 weeks  To improve comprehension of words in sentences, pt used [employer] for comprehension sentence completion  Pt given sentence with blank and was provided with choices in F:4  Completed in 10/15 opp independently  Increased to 15/15 on "second" guess with verbal prompt, "does that make sense?"      To target auditory attention processing and memory/mental manipulation, pt will be verbally given 4 words by therapist and is to unscramble words into a grammatically correct sentence  Pt had difficulty being provided words aloud  Required visual  With visual, pt completed task in 2/10 opp independently, increasing to 9/10 when provided first word of sentence  Pt required extended processing time  Plan:   -Patient was provided with home exercises/activities to target goals in plan of care at the end of today's session   -Discussed session and patient's progress with caregiver/family member after today's session   -Continue with current plan of care

## 2018-09-17 NOTE — PROGRESS NOTES
Daily Note     Today's date: 2018  Patient name: Mara Del Rosario  : 1960  MRN: [id number]  Referring provider: Rivka Ayala MD  Dx:   Encounter Diagnosis   Name Primary?  History of ischemic cerebrovascular accident (CVA) with residual deficit Yes                  Subjective: "I don't remember from last time "      Objective: See treatment diary below  Assessment: Tolerated treatment well  UEB 5 min prograde, 5 min retrograde at 1 0 resistance for BUE endurance, requiring frequent verbal cues for pacing and follow through with task, and rest break x1  3x10 reps while supine of BUE chest press and OH reach utilizing 2# yellow therabar for RUE strengthening, motor re-ed, and decreased ataxia  Required mod verbal cues to fully extend elbows during chest press, murali RUE during OH reach  BIONESS to RUE for 10min on neuro mod for motor and sensory re-ed, with pt tolerating well  IASTM to R hand for sensory re-ed for 5min  IR/ER seated task involving OH pass of foam letter blocks with vision occluded from R>L hand and behind waist pass from L>R hand followed by tossing block into target located on low surface to R, with focus on improving R neglect, proprioception of R hand to target, and automaticity of grasp/release  Required frequent verbal cues for posture, sustained attention, and sequencing  60% accuracy when tossing block at target  Plan: Continue skilled OT per POC with focus on R neglect, motor/sensory re-ed, decreased ataxia, hand to target accuracy, and automaticity of grasp/release         INTERVENTION COMMENTS:  Diagnosis: History of ischemic cerebrovascular accident (CVA) with residual deficit [I69 30]  Precautions: fall risk, impulsive, /VM impairments              FOTO:  7 of 24 visits, PN due

## 2018-09-17 NOTE — PROGRESS NOTES
Daily Note     Today's date: 2018  Patient name: Tommy Menjivar  : 1960  MRN: [id number]  Referring provider: Gabby Byers MD  Dx:   Encounter Diagnosis     ICD-10-CM    1  Cerebrovascular accident (CVA), unspecified mechanism ([facility] [address] ) I63 9    2  Impaired functional mobility, balance, and endurance Z74 09        Subjective: Pt has no new complaints  Objective: See treatment diary below    Precautions: FALL risk, DM II, HTN, spinal stimulator (no stim), CDK, poor safety awareness, *Fear of Dogs*     Daily Treatment Diary      Manual                                                                                                                                                      Exercise Diary          Nustep L5, 10 min        STS         Static balance         Ambulation Solo, fwd/bwd 3 laps        Semi tandem           Sidestepping Solo, 3 laps         Alternating step taps 6'', 2x10        Weaving in between [employer], 3 laps         Stepping over hurdles Solo, fwd/lateral, 3 laps         Marching Solo, 3 laps         Step ups 6'', Fwd, 2x10                                                                                           Modalities                                                                                                      Assessment: Patient continues to demonstrate impulsive movements with difficulty controlling  Educated in performing exercises slowly and with more control  Requires cueing for proper form and technique with exercises  Impression of decreased safety awareness  Plan: Progress treatment as tolerated

## 2018-09-20 ENCOUNTER — OFFICE VISIT (OUTPATIENT)
Dept: SPEECH THERAPY | Facility: CLINIC | Age: 58
End: 2018-09-20
Payer: COMMERCIAL

## 2018-09-20 ENCOUNTER — OFFICE VISIT (OUTPATIENT)
Dept: OCCUPATIONAL THERAPY | Facility: CLINIC | Age: 58
End: 2018-09-20
Payer: COMMERCIAL

## 2018-09-20 ENCOUNTER — EVALUATION (OUTPATIENT)
Dept: PHYSICAL THERAPY | Facility: CLINIC | Age: 58
End: 2018-09-20
Payer: COMMERCIAL

## 2018-09-20 DIAGNOSIS — R41.89 COGNITIVE IMPAIRMENT: ICD-10-CM

## 2018-09-20 DIAGNOSIS — Z74.09 IMPAIRED FUNCTIONAL MOBILITY, BALANCE, AND ENDURANCE: ICD-10-CM

## 2018-09-20 DIAGNOSIS — I63.9 CEREBROVASCULAR ACCIDENT (CVA), UNSPECIFIED MECHANISM (HCC): Primary | ICD-10-CM

## 2018-09-20 DIAGNOSIS — I63.9 ISCHEMIC CEREBROVASCULAR ACCIDENT (CVA) (HCC): ICD-10-CM

## 2018-09-20 DIAGNOSIS — R48.8 OTHER SYMBOLIC DYSFUNCTIONS: Primary | ICD-10-CM

## 2018-09-20 DIAGNOSIS — I69.30 HISTORY OF ISCHEMIC CEREBROVASCULAR ACCIDENT (CVA) WITH RESIDUAL DEFICIT: Primary | ICD-10-CM

## 2018-09-20 PROCEDURE — 97110 THERAPEUTIC EXERCISES: CPT | Performed by: PHYSICAL THERAPIST

## 2018-09-20 PROCEDURE — G8979 MOBILITY GOAL STATUS: HCPCS | Performed by: PHYSICAL THERAPIST

## 2018-09-20 PROCEDURE — 92507 TX SP LANG VOICE COMM INDIV: CPT

## 2018-09-20 PROCEDURE — 97112 NEUROMUSCULAR REEDUCATION: CPT

## 2018-09-20 PROCEDURE — G8978 MOBILITY CURRENT STATUS: HCPCS | Performed by: PHYSICAL THERAPIST

## 2018-09-20 PROCEDURE — 97110 THERAPEUTIC EXERCISES: CPT

## 2018-09-20 PROCEDURE — 97112 NEUROMUSCULAR REEDUCATION: CPT | Performed by: PHYSICAL THERAPIST

## 2018-09-20 NOTE — PROGRESS NOTES
Daily Speech Treatment Note    Today's date: 2018  Patients name: Ravinder Simons  : 1960  MRN: [id number]  Safety measures: CVA   Referring provider: Naty Zaidi MD    Primary Diagnosis/Billing code: P10 6  Secondary Diagnosis/ Billing code: I63 9, I69 322     Visit Tracking:  -Referring provider: EPIC   -Billing guidelines: AMA  -Visit #10/24   -[facility]  ([facility]-no auth;BOMN    [facility]-auth post 24 visits)   -RE due 2018  Subjective/Behavioral:    "I don't care  I'm tired "  Pt seen after 2 hours of therapy  Pt with increased fatigue and requesting frequent breaks during session  Suggested he should have appointments earlier in the day, however, pt would be without transportation  Objective/Assessment:  Forgot HEP folder  Short-term goals:    2  Patient will be educated on oral motor exercises and provided an appropriate at home exercise program to be practiced to facilitate improved strength and function for increased speech intelligibility  To be achieved in 4-6 weeks  Pt reports he does not practice exercises to improve his speech intelligibility at home  Exercises will be targeted in session next time  3  Patient will be educated on word finding strategies (i e , circumlocution) for improved generative naming and verbal expression skills  4  Patient will name an average of 15+ items in a category in 60 seconds over 5 trials using compensatory strategies and min cues to facilitate improved word retrieval skills, to be achieved in 4-6 weeks  5  Patient will name an average of 10+ words that begin with a specific letter in 60 seconds over 5 trials using compensatory strategies and min cues to facilitate improved word retrieval skills, to be achieved in 4-6 weeks  6  Patient will complete concrete and abstract categorization tasks to 80% accuracy to facilitate improved generative naming skills and working memory, to be achieved in 4-6 weeks    To improve generative naming skills, pt was given a abstract category and asked to provide 3 members  Trialed 10x  Able to provide items in 24/30 opp independently, increasing to 27/30 with mod verbal semantic cues  To target word finding and attention; pt attempted to complete the card game "Anomia" where they are asked to name people/places/things based on category presented on card (i e , artificial sweetener)  Target of game was for speed of naming and processing  Task was too difficult  Decreased complexity to no use of timer  Pt able to name item on card in 15/27 opp independently, increasing to 19/27 with verbal cues  7  Patient will complete deductive reasoning puzzles with 80% accuracy to facilitate increased working memory, problem solving, and processing skills, to be achieved in 4-6 weeks  8  Patient will complete thought organization tasks (e g , sequencing, deduction puzzles, etc ) with 80% accuracy to facilitate increased executive functioning skills, to be achieved in 4-6 weeks  Fill in blanks with recall: Patient was asked to fill in missing letters within a 10 word list of words within a category (i e , fruits; o_an_e (orange))  Pt required a written list as a visual for a word bank; otherwise demonstrated increased confusion  Patient completed ID of words in 10/10 opp  Pt with improved speed on task completion when compared to previous sessions  To improve matching words with pictures, pt used [employer]  Pt was provided a picture and F:4 (words) that matches the picture  Task completed in 22/25 opp independently  Requires verbal cues to pay attention to details in words/letters  Plan:   -Patient was provided with home exercises/activities to target goals in plan of care at the end of today's session   -Continue with current plan of care

## 2018-09-20 NOTE — PROGRESS NOTES
Daily Note     Today's date: 2018  Patient name: Loreto Marroquin  : 1960  MRN: [id number]  Referring provider: Queenie Charles MD  Dx:   Encounter Diagnosis   Name Primary?  History of ischemic cerebrovascular accident (CVA) with residual deficit Yes                  Subjective: "No  Didn't exercise "      Objective: See treatment below  UEB for 5 minutes prograde at 2 0-unable to complete retrograde today due to pain in left bicep  2# weighted bar in supine for all planes  Engaged patient in [facility] and grasp strengthening with use of red resistive clip and black grippers  Assessment: Tolerated treatment well  Noted with pain in left bicep during UEB  Moderate droppage and difficulty contraoling grippers with right hand        Plan: Continued skilled OT per POC    INTERVENTION COMMENTS:  Diagnosis: History of ischemic cerebrovascular accident (CVA) with residual deficit [I69 30]  Precautions: fall risk, impulsive, /VM impairments     FOTO:  8 of 24 visits, PN due

## 2018-09-20 NOTE — PROGRESS NOTES
Progress Note     Today's date: 2018  Patient name: Michael Rosen  : 1960  MRN: [id number]  Referring provider: Panda Peres MD  Dx:   Encounter Diagnosis     ICD-10-CM    1  Cerebrovascular accident (CVA), unspecified mechanism ([facility] [address] ) I63 9    2  Impaired functional mobility, balance, and endurance Z74 09                   Subjective: Pt states that he feels like everything is getting better  Per wife, pt does not listen and does not use RW at home  Objective: See treatment diary below     Balance Test     6 Minute Walk Test (ft): 400ft w/ RW at 3 min, unsafe turns    2 Minute Walk Test (ft): Prev = 200 ft, unsafe turns   Gait Speed (ft/s): 7 sec, 0 87 m/s, Prev = 0 65 m/s w/ RW    5x Sit To Stand (s): 13 sec , Prev = 17 sec, no UE   TU sec, prev = 28 sec w/ RW   BURGER/56, prev = 29/56        MCTSIB Number of Seconds   Feet Together, Eyes Open 30 sec, Prev = 17   Feet Together, Eyes Closed 20 sec,  Prev = 6 8   Feet Together, Eyes Open Foam 5 sec, Prev = NT   Feet Together, Eyes Closed Foam NT          Assessment: Progress update performed this session  Pt has made significant improvements in balance per BURGER and mCTSIB, however continues to be a high fall risk  Pt only made minor improvement in endurance and only tolerated 3 min of ambulation when attempting 6 MWT, also requires frequent rest breaks throughout the session  He continues to be very impulsive and demo poor safety awareness which increases his risk of falls  Recommended pt continue using RW at home for safety  At this time, Patient would benefit from continued PT    STG - 4 weeks  1  BURGER > 37/56 - met   2  TUG < 20 sec - met   3  5 x STS < 12 sec - partially met  4  Complete 6 MWT - not MET (3 min only today)   5  Distant supervision during ambulation with RW and transfers - not met (close supervision)     LTG - 8 weeks  1  BURGER > or = 45/56 - progressing  2   Ambulate with no AD safely within household distances  - progressing  3  SSV = or > 0 8 m/s - met   4  Maintain balance up to 30 seconds in first two conditions of MCTSIB (FTEO/EC firm and foam) - progressing    Plan:  Progress treatment as tolerated

## 2018-09-21 ENCOUNTER — OFFICE VISIT (OUTPATIENT)
Dept: INTERNAL MEDICINE CLINIC | Facility: CLINIC | Age: 58
End: 2018-09-21
Payer: COMMERCIAL

## 2018-09-21 VITALS
WEIGHT: 242 LBS | BODY MASS INDEX: 36.68 KG/M2 | HEART RATE: 78 BPM | TEMPERATURE: 96.8 F | RESPIRATION RATE: 18 BRPM | DIASTOLIC BLOOD PRESSURE: 76 MMHG | HEIGHT: 68 IN | OXYGEN SATURATION: 98 % | SYSTOLIC BLOOD PRESSURE: 136 MMHG

## 2018-09-21 DIAGNOSIS — I69.30 HISTORY OF ISCHEMIC CEREBROVASCULAR ACCIDENT (CVA) WITH RESIDUAL DEFICIT: ICD-10-CM

## 2018-09-21 DIAGNOSIS — Z79.4 TYPE 2 DIABETES MELLITUS WITH STAGE 3 CHRONIC KIDNEY DISEASE, WITH LONG-TERM CURRENT USE OF INSULIN (HCC): ICD-10-CM

## 2018-09-21 DIAGNOSIS — E11.22 TYPE 2 DIABETES MELLITUS WITH STAGE 3 CHRONIC KIDNEY DISEASE, WITH LONG-TERM CURRENT USE OF INSULIN (HCC): ICD-10-CM

## 2018-09-21 DIAGNOSIS — K74.60 HEPATIC CIRRHOSIS, UNSPECIFIED HEPATIC CIRRHOSIS TYPE, UNSPECIFIED WHETHER ASCITES PRESENT (HCC): ICD-10-CM

## 2018-09-21 DIAGNOSIS — N18.30 TYPE 2 DIABETES MELLITUS WITH STAGE 3 CHRONIC KIDNEY DISEASE, WITH LONG-TERM CURRENT USE OF INSULIN (HCC): ICD-10-CM

## 2018-09-21 DIAGNOSIS — Z23 NEED FOR VACCINATION FOR STREP PNEUMONIAE: ICD-10-CM

## 2018-09-21 DIAGNOSIS — I12.9 BENIGN HYPERTENSIVE KIDNEY DISEASE WITH CHRONIC KIDNEY DISEASE STAGE I THROUGH STAGE IV, OR UNSPECIFIED: Primary | ICD-10-CM

## 2018-09-21 PROCEDURE — 99214 OFFICE O/P EST MOD 30 MIN: CPT | Performed by: INTERNAL MEDICINE

## 2018-09-21 PROCEDURE — 90471 IMMUNIZATION ADMIN: CPT

## 2018-09-21 PROCEDURE — 90732 PPSV23 VACC 2 YRS+ SUBQ/IM: CPT

## 2018-09-21 PROCEDURE — 3008F BODY MASS INDEX DOCD: CPT | Performed by: INTERNAL MEDICINE

## 2018-09-21 NOTE — Clinical Note
Hi Dr Juhi Wood  Pt has CKD stage 4 & I want to start an SSRI for depression  Do you have a preference between lexapro 5mg per day or wellbutrin 75mg once a day? FYI Sherre Soulier Sherre Soulier Aleene Rea also has some signs of cirrhosis  thx

## 2018-09-21 NOTE — PATIENT INSTRUCTIONS
1  Increase insulin to 38 units with dinner & call me with blood sugar readings in 1 week  2  Have balanced carbohydrates with each meal  3  Pneumonia vaccine  4  Ultrasound of liver  5  escitalopram 5mg OR buproprion 75mg once a day -> WILL CALL YOU TO LET YOU KNOW  6   Return in 1 month

## 2018-09-21 NOTE — PROGRESS NOTES
Assessment/Plan:     Diagnoses and all orders for this visit:    Benign hypertensive kidney disease with chronic kidney disease stage I through stage IV, or unspecified  Comments:  BP doing well, c/w BB/CCB and f/u renal    Type 2 diabetes mellitus with stage 3 chronic kidney disease, with long-term current use of insulin (HCC)  Comments:  increase evening insulin dose to 38 U and continue monitoring home blood sugars    History of ischemic cerebrovascular accident (CVA) with residual deficit  Comments:  continue PT & with asa/statin/BP and blood sugar control & seeing neurology for ongoing care    Hepatic cirrhosis, unspecified hepatic cirrhosis type, unspecified whether ascites present ([facility] [address] )  Comments:  noted on imaging and s/p GI eval(2nd to [facility]ERNAN[facility]Z?) -> discussed weight loss/exercise  Orders:  -     US liver; Future    Need for vaccination for Strep pneumoniae  Comments:  risk/benefit of pneumovax vaccine discussed & pt elected to receive - administered  Orders:  -     Pneumococcal polysaccharide vaccine 23-valent greater than or equal to 3yo subcutaneous/IM    Other orders  -     B-D INS SYRINGE 0 5CC/30GX1/2" 30G X 1/2" 0 5 ML MISC;       spoke with ellie at [place] [id number] about dosing of SSRI in setting of cirrhosis and CKD stage 4, advised lexapro at 5mg dose(caution for use in severe renal disease) or wellbutrin(immediate release) at 75mg per day may be options    Subjective:      Patient ID: Daniel Vaughn is a 62 y o  male  HPI    Here for follow up, here with wife during appt  Reviewed meds with patient  Checking BS and readings are better in morning(150-160s) and evening(120-140)  Taking insulin 70/30 40 U in morning and 35 U in evening  Had low BS event(45) at home after lunch(fish and salad) and had to go to ER to be treated  Feeling anxious and depressed since having stroke, refused to take medication for this in past but wife today is insistent on rx and pt is willing    Hx of CKD stage 4 and suspected cirrhosis(2nd to FERNANDEZ?)  Declines flu vaccine but okay to receive pneumonia vaccine  No other complaints  Past Medical History:   Diagnosis Date    Diabetes mellitus ([facility] [address] )     Hypercholesteremia     Hyperlipidemia     Hypertension     Neuropathy     Obesity     [facility]     last assessed 11/4/16    PVC's (premature ventricular contractions)     sees cardiology Dr Claudy camargo     Vitals:    09/21/18 1456   BP: 136/76   Pulse: 78   Resp: 18   Temp: (!) 96 8 °F (36 °C)   TempSrc: Oral   SpO2: 98%   Weight: 110 kg (242 lb)   Height: 5' 8" (1 727 m)     Body mass index is 36 8 kg/m²  Current Outpatient Prescriptions:     amLODIPine (NORVASC) 5 mg tablet, Take 1 tablet (5 mg total) by mouth every 12 (twelve) hours, Disp: 60 tablet, Rfl: 3    aspirin (ECOTRIN LOW STRENGTH) 81 mg EC tablet, Take 81 mg by mouth daily Resume on 8/14, Disp: , Rfl:     atorvastatin (LIPITOR) 80 mg tablet, Take 1 tablet (80 mg total) by mouth daily with dinner, Disp: 30 tablet, Rfl: 3    B-D INS SYRINGE 0 5CC/30GX1/2" 30G X 1/2" 0 5 ML MISC, , Disp: , Rfl:     Blood Glucose Monitoring Suppl (ONE TOUCH ULTRA 2) w/Device KIT, by Does not apply route 3 (three) times a day, Disp: 1 each, Rfl: 0    Blood Pressure Monitoring (BLOOD PRESSURE CUFF) MISC, Use to check blood pressure before taking blood pressure medication and 1 hour after and follow instructions provided in discharge instructions based on the readings  , Disp: 1 each, Rfl: 0    carvedilol (COREG) 6 25 mg tablet, Take 1 tablet (6 25 mg total) by mouth 2 (two) times a day with meals, Disp: 60 tablet, Rfl: 3    diphenoxylate-atropine (LOMOTIL) 2 5-0 025 mg per tablet, Take 1 tablet by mouth 4 (four) times a day as needed for diarrhea, Disp: , Rfl:     famotidine (PEPCID) 20 mg tablet, Take 20 mg by mouth daily Resume on 8/14, Disp: , Rfl:     gabapentin (NEURONTIN) 250 mg/5 mL solution, Take 12 mL (600 mg total) by mouth daily at bedtime, Disp: 470 mL, Rfl: 2    glucose 4 g chewable tablet, Chew 3 tablets (12 g total) as needed for low blood sugar, Disp: 50 tablet, Rfl: 0    glucose blood (ACCU-CHEK ACTIVE STRIPS) test strip, 1 each by Other route 3 (three) times a day Use as instructed, Disp: 100 each, Rfl: 3    insulin aspart protamine-insulin aspart (NovoLOG 70/30) 100 units/mL injection, Administer 40 U in AM and 38 U in evening or as directed, Disp: 10 mL, Rfl: 0    lactulose 20 g/30 mL, Take 30 mL (20 g total) by mouth daily as needed (constipation), Disp: 473 mL, Rfl: 0    neomycin-bacitracin-polymyxin b (NEOSPORIN) ointment, Apply 1 application topically 2 (two) times a day Apply twice per day to shin wound until fully healed  If not fully healed in 5 days contact your family doctor  Next application is the evening of 8/13, Disp: , Rfl:   Allergies   Allergen Reactions    Penicillins Other (See Comments) and Hives     Rash:jose miguel augmentin in past  Rash:jose miguel augmentin in past         Review of Systems   Constitutional: Negative for fever  Eyes: Negative for visual disturbance  Respiratory: Negative for shortness of breath  Cardiovascular: Negative for chest pain  Gastrointestinal: Negative for abdominal pain  Genitourinary: Negative for dysuria  Musculoskeletal: Negative for arthralgias  Skin: Negative for rash  Neurological: Positive for weakness (from stroke) and numbness  Negative for headaches  Psychiatric/Behavioral: Positive for dysphoric mood  The patient is nervous/anxious  Objective:      /76   Pulse 78   Temp (!) 96 8 °F (36 °C) (Oral)   Resp 18   Ht 5' 8" (1 727 m)   Wt 110 kg (242 lb)   SpO2 98%   BMI 36 80 kg/m²          Physical Exam   Constitutional: He appears well-developed and well-nourished  HENT:   Head: Normocephalic and atraumatic  Mouth/Throat: Oropharynx is clear and moist    Eyes: Pupils are equal, round, and reactive to light     Cardiovascular: Normal rate, regular rhythm and normal heart sounds  No murmur heard  Pulmonary/Chest: Effort normal and breath sounds normal  He has no wheezes  He has no rales  Abdominal: Soft  Bowel sounds are normal  There is no tenderness  Musculoskeletal: He exhibits no edema  Neurological: He is alert  Psychiatric: He has a normal mood and affect  His behavior is normal    Vitals reviewed        Lab Results   Component Value Date    GLUCOSE 213 (H) 07/29/2018    CALCIUM 9 1 09/03/2018     09/03/2018    K 4 5 09/03/2018    CO2 25 09/03/2018     09/03/2018    BUN 43 (H) 09/03/2018    CREATININE 2 67 (H) 09/03/2018     Lab Results   Component Value Date    ALT 34 09/03/2018    AST 20 09/03/2018    ALKPHOS 162 (H) 09/03/2018    BILITOT 0 6 08/10/2016

## 2018-09-23 ENCOUNTER — HOSPITAL ENCOUNTER (OUTPATIENT)
Dept: ULTRASOUND IMAGING | Facility: HOSPITAL | Age: 58
Discharge: HOME | End: 2018-09-23
Payer: COMMERCIAL

## 2018-09-23 DIAGNOSIS — K74.60 HEPATIC CIRRHOSIS, UNSPECIFIED HEPATIC CIRRHOSIS TYPE, UNSPECIFIED WHETHER ASCITES PRESENT (HCC): ICD-10-CM

## 2018-09-23 PROCEDURE — 76705 ECHO EXAM OF ABDOMEN: CPT

## 2018-09-24 ENCOUNTER — TELEPHONE (OUTPATIENT)
Dept: INTERNAL MEDICINE CLINIC | Facility: CLINIC | Age: 58
End: 2018-09-24

## 2018-09-24 ENCOUNTER — OFFICE VISIT (OUTPATIENT)
Dept: NEUROLOGY | Facility: CLINIC | Age: 58
End: 2018-09-24
Payer: COMMERCIAL

## 2018-09-24 VITALS
WEIGHT: 239.4 LBS | HEART RATE: 78 BPM | SYSTOLIC BLOOD PRESSURE: 140 MMHG | BODY MASS INDEX: 36.28 KG/M2 | DIASTOLIC BLOOD PRESSURE: 72 MMHG | HEIGHT: 68 IN

## 2018-09-24 DIAGNOSIS — E55.9 VITAMIN D DEFICIENCY: ICD-10-CM

## 2018-09-24 DIAGNOSIS — R41.89 COGNITIVE IMPAIRMENT: ICD-10-CM

## 2018-09-24 DIAGNOSIS — E11.42 DIABETIC POLYNEUROPATHY ASSOCIATED WITH TYPE 2 DIABETES MELLITUS (HCC): ICD-10-CM

## 2018-09-24 DIAGNOSIS — I63.9 EMBOLIC STROKE OF LEFT BASAL GANGLIA (HCC): Primary | ICD-10-CM

## 2018-09-24 DIAGNOSIS — E53.8 B12 DEFICIENCY: ICD-10-CM

## 2018-09-24 DIAGNOSIS — R94.01 ABNORMAL EEG: ICD-10-CM

## 2018-09-24 DIAGNOSIS — I69.30 HISTORY OF ISCHEMIC CEREBROVASCULAR ACCIDENT (CVA) WITH RESIDUAL DEFICIT: Primary | ICD-10-CM

## 2018-09-24 DIAGNOSIS — R39.89 URINE TROUBLES: ICD-10-CM

## 2018-09-24 PROCEDURE — 99215 OFFICE O/P EST HI 40 MIN: CPT | Performed by: NURSE PRACTITIONER

## 2018-09-24 NOTE — PROGRESS NOTES
Patient ID: Luis Yoo is a 62 y o  male  Assessment/Plan:    Embolic stroke of left basal ganglia (HCC)  S/P tPA  He is overall doing well from a stroke standpoint, with no new symptoms reported that are concerning for stroke  He was encouraged to maintain good control of secondary stroke risk factors, including blood pressure, cholesterol, and blood sugar  I will defer monitoring and management of these issues to his PCP  He should continue on his aspirin, statin, and appropriate blood pressure and diabetes medications  He should continue with PT, OT, and speech as this will help him in his continued recovery  We discussed that any new stroke like symptoms should prompt him to call 911 or proceed to the nearest ED as soon as possible  Cognitive impairment  Likely multi-factorial given recent stroke, underlying depression, as well as vitamin B12 and vitamin D deficiencies  Overall, mostly short term memory complaints are noted  He is functioning well at home and was previously able to hold a job  No safety concerns or personality changes noted, other than depression which has apparently been longstanding  Will start him on B12 and vitamin D supplements today and re-check levels in 8 weeks  He has discussed treating his depression with his PCP and is agreeable to starting a medication, but was waiting for neurology's input prior to picking a specific medication  I have message Dr Claudia Nolan, with my recommendation that any SSRI would be ok but I would avoid the use of wellbutrin as it can lower the seizure threshold  I would avoid this given his recent stroke as well as his abnormal EEG  He should continue with cognitive therapy with speech as well  Will re-evaluate memory at his next visit  Abnormal EEG  Although his EEG was mildly abnormal, Mr Alec Nicholson has not reported any events or past medical history that is concerning for seizure   His recent stroke does slightly increase his chances of having a seizure, but I do not expect this to happen  We discussed symptoms that would be concerning for seizure, and they will call to report any should they occur  Subjective: Polina Ojeda is a 62 y o  With a hx DM-poorly controlled, per son, who [facility] with acute right sided paresis, slurred speech, mild confusion and aphasia, left ptosis- presentation concerning for acute brain stem infarct  Patient with no prior history similar per family, no known history stroke or seizure or TIA  He has no known history of heart disease, heart attack, irregular heart rhythms, not a smoker and no significant alcohol use per son      Family did note that for the last year he has had periods of mild confusion at home, correlating with increased stress in his life with a break with some of his family  He however did all his ADLs and worked driving 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 with no difficulties  It was decided to give him tPA  Initial CTH revealed negative for acute abnormality, though chronic lacunar infarcts are noted  Repeat CT head 24 hours post tPA revealed evolving left basal ganglia infarct  No sign of hemorrhage  CTA H/N with no acute large vessel occlusion  MRI brain noted for recent ischemia, without hemorrhage in the left posterior perforating substance affecting hypothalamus, anterolateral thalamus, and genu of the left internal capsule  Echo with EF 55%, no RWMA, mild valvular regurgitation, normal sized atria bilaterally  LDL 64, A1c 7 5  Routine EEG was performed due to family and nurses noting abrupt confusional episodes  Routine EEG noting mild paucity of faster activities on the left as well as intermittent left temporal polymorphic delta slowing suggests relative cerebral dysfunction on the left, maximal over the left temporal region  Less prominent intermittent right temporal slowing suggests additional regions of underlying focal cerebral dysfunction    A single left temporal suspicious sharp transient raises the possibility of underlying epileptogenic potential, but is not a definite indication of increased seizure risk  B12 low at 237  Vitamin D low at 19  He was started on aspirin 81mg and lipitor 80mg and sent to the [facility] for rehab  Today Mr Mariah Stein presents for follow-up of his recent stroke, accompanied by his wife who is helping to provide history  He is doing ok  He denies dizziness, vision changes, speech changes, dysphagia, numbness or tingling, focal weakness or any falls  He does get an occasional headache  He denies any history of myoclonus, staring spells, automatisms, unexplained hyperkinetic behaviors, olfactory / gustatory hallucinations, epigastric rising events, arturo vu events, visual hallucinations, or nocturnal tongue biting  He is having difficulty with urination overnight, but denies any issues during the day  He was told this was part of the stroke sequela  He continues on aspirin 81mg and lipitor 80mg  No issues with bleeding or bruising  He is still doing outpatient PT, OT, and speech  Epilepsy Risk Factors:  Uncomplicated pregnancy with normal development  No learning disabilities or cognitive delay  No h/o febrile seizures, CNS infections, CNS neoplasms  There is no family history of seizures or epilepsy  He does have a history of strokes  The following portions of the patient's history were reviewed and updated as appropriate: past family history, past social history and past surgical history  Objective:    Blood pressure 140/72, pulse 78, height 5' 8" (1 727 m), weight 109 kg (239 lb 6 4 oz)  Physical Exam   Constitutional: He appears well-developed and well-nourished  HENT:   Head: Normocephalic  Eyes: EOM are normal  Pupils are equal, round, and reactive to light  Neurological: He has normal strength  Gait and coordination normal    Psychiatric: He has a normal mood and affect   His behavior is normal  Tearful at times   Vitals reviewed  Neurological Exam    Mental Status  The patient is alert  He has dysarthria of mild severity  He has normal attention span and concentration  He follows multi-step commands  He has a normal fund of knowledge  Mild to moderate dysarthria     Cranial Nerves    CN II: The patient's visual acuity and visual fields are normal   CN III, IV, VI: The patient's pupils are equally round and reactive to light and ocular movements are normal   CN V: The patient has normal facial sensation  CN VII:  The patient has symmetric facial movement  CN VIII:  The patient's hearing is normal   CN IX, X: The patient has symmetric palate movement and normal gag reflex  CN XI: The patient's shoulder shrug strength is normal   CN XII: The patient's tongue is midline without atrophy or fasciculations  Mild left lid ptosis     Motor  The patient has normal muscle bulk throughout  His overall muscle tone is normal throughout  His strength is 5/5 throughout all four extremities  Sensory  The patient's sensation is to light touch  Gait and Coordination  The patient has normal gait and station  Romberg's sign is negative  He has normal coordination bilaterally  Using a wheeled walker, but overall normal stance and stride         ROS:    Review of Systems   Constitutional: Negative  HENT: Negative  Eyes: Negative  Respiratory: Negative  Cardiovascular: Negative  Gastrointestinal: Negative  Endocrine: Negative  Genitourinary: Negative  Musculoskeletal: Positive for gait problem  Skin: Negative  Allergic/Immunologic: Negative  Neurological: Positive for dizziness and headaches  Hematological: Negative  Psychiatric/Behavioral: Positive for confusion  The patient is nervous/anxious

## 2018-09-24 NOTE — TELEPHONE ENCOUNTER
Called [facility], states they do not carry the male urine cups  Can you please print order  Will fax to [employer]

## 2018-09-24 NOTE — TELEPHONE ENCOUNTER
Order entered, pls fax    If [facility] does not carry, pls contact [facility] and fax order to them    thx

## 2018-09-24 NOTE — ASSESSMENT & PLAN NOTE
S/P tPA  He is overall doing well from a stroke standpoint, with no new symptoms reported that are concerning for stroke  He was encouraged to maintain good control of secondary stroke risk factors, including blood pressure, cholesterol, and blood sugar  I will defer monitoring and management of these issues to his PCP  He should continue on his aspirin, statin, and appropriate blood pressure and diabetes medications  He should continue with PT, OT, and speech as this will help him in his continued recovery  We discussed that any new stroke like symptoms should prompt him to call 911 or proceed to the nearest ED as soon as possible

## 2018-09-24 NOTE — ASSESSMENT & PLAN NOTE
Although his EEG was mildly abnormal, Mr Roberto Wilks has not reported any events or past medical history that is concerning for seizure  His recent stroke does slightly increase his chances of having a seizure, but I do not expect this to happen  We discussed symptoms that would be concerning for seizure, and they will call to report any should they occur

## 2018-09-24 NOTE — PATIENT INSTRUCTIONS
Continue with good control of your secondary stroke risk factors including blood pressure, cholesterol, and blood sugar  I will defer monitoring and management of these issues to your primary care physician  Continue your aspirin, statin, and blood pressure and diabetes medications as prescribed  Any new stroke-like symptoms such as facial drooping, slurred speech, painless loss of vision, persistent dizziness, numbness/tingling/weakness on one side of your body you should call 911 or go to the nearest emergency room as soon as possible  Continue with your therapies  I will message Dr Corina Clemons about the best anti-depressant choice for you so that he can order it  Start vitamin D 50,000units weekly  Start vitamin B12 1000-5000mcg daily  I sent a script to your pharmacy but if your insurance doesn't cover this, you can get this over the counter  We will re-check you B12 and vitamin D levels in 8 weeks  Call if you have any further sudden attacks of confusion or any twitching, staring spells, olfactory / gustatory hallucinations, epigastric rising events, arturo vu events, visual hallucinations, unexplained nocturnal enuresis, or nocturnal tongue biting

## 2018-09-24 NOTE — ASSESSMENT & PLAN NOTE
Likely multi-factorial given recent stroke, underlying depression, as well as vitamin B12 and vitamin D deficiencies  Overall, mostly short term memory complaints are noted  He is functioning well at home and was previously able to hold a job  No safety concerns or personality changes noted, other than depression which has apparently been longstanding  Will start him on B12 and vitamin D supplements today and re-check levels in 8 weeks  He has discussed treating his depression with his PCP and is agreeable to starting a medication, but was waiting for neurology's input prior to picking a specific medication  I have message Dr Jermaine Brennan, with my recommendation that any SSRI would be ok but I would avoid the use of wellbutrin as it can lower the seizure threshold  I would avoid this given his recent stroke as well as his abnormal EEG  He should continue with cognitive therapy with speech as well  Will re-evaluate memory at his next visit

## 2018-09-24 NOTE — TELEPHONE ENCOUNTER
Patients son is requesting male urinal cups with covers because pt is having difficulty controlling his urine      Pharmacy is [facility] [address] [facility], if they dont have it [address] [facility] in [place]

## 2018-09-25 ENCOUNTER — OFFICE VISIT (OUTPATIENT)
Dept: SPEECH THERAPY | Facility: CLINIC | Age: 58
End: 2018-09-25
Payer: COMMERCIAL

## 2018-09-25 ENCOUNTER — OFFICE VISIT (OUTPATIENT)
Dept: PHYSICAL THERAPY | Facility: CLINIC | Age: 58
End: 2018-09-25
Payer: COMMERCIAL

## 2018-09-25 ENCOUNTER — TELEPHONE (OUTPATIENT)
Dept: INTERNAL MEDICINE CLINIC | Facility: CLINIC | Age: 58
End: 2018-09-25

## 2018-09-25 ENCOUNTER — OFFICE VISIT (OUTPATIENT)
Dept: OCCUPATIONAL THERAPY | Facility: CLINIC | Age: 58
End: 2018-09-25
Payer: COMMERCIAL

## 2018-09-25 DIAGNOSIS — Z74.09 IMPAIRED FUNCTIONAL MOBILITY, BALANCE, AND ENDURANCE: ICD-10-CM

## 2018-09-25 DIAGNOSIS — I69.30 HISTORY OF ISCHEMIC CEREBROVASCULAR ACCIDENT (CVA) WITH RESIDUAL DEFICIT: Primary | ICD-10-CM

## 2018-09-25 DIAGNOSIS — I63.9 CEREBROVASCULAR ACCIDENT (CVA), UNSPECIFIED MECHANISM (HCC): Primary | ICD-10-CM

## 2018-09-25 DIAGNOSIS — R41.89 COGNITIVE IMPAIRMENT: ICD-10-CM

## 2018-09-25 PROCEDURE — 92507 TX SP LANG VOICE COMM INDIV: CPT | Performed by: SPEECH-LANGUAGE PATHOLOGIST

## 2018-09-25 PROCEDURE — 97112 NEUROMUSCULAR REEDUCATION: CPT

## 2018-09-25 PROCEDURE — 97110 THERAPEUTIC EXERCISES: CPT

## 2018-09-25 NOTE — PROGRESS NOTES
Daily Speech Treatment Note    Today's date: 2018  Patients name: Erin Severe  : 1960  MRN: [id number]  Safety measures: CVA   Referring provider: Sung Lin MD    Primary Diagnosis/Billing code: A07 7  Secondary Diagnosis/ Billing code: I63 9, I69 [id number]     Visit Tracking:  -Referring provider: EPIC   -Billing guidelines: AMA  -[facility] #   [facility]    [facility]-auth post 24 visits)   -RE due 2018  Subjective/Behavioral:    "doing well"  Pt was engaged and enthusiastic about participating although he did monitor his watch throughout the session  Objective/Assessment:   Pt had some difficulty communicating names and facts about his family  Short-term goals:    2  Patient will be educated on oral motor exercises and provided an appropriate at home exercise program to be practiced to facilitate improved strength and function for increased speech intelligibility  To be achieved in 4-6 weeks  Pt reported memory of oral motor exercises but was not able to rely details of them  Pt participated in whistling tasks and diadokinetic tasks  Pt reported no inclination to wear dentures due to pain  3  Patient will be educated on word finding strategies (i e , circumlocution) for improved generative naming and verbal expression skills  Pt participated in discussion of word finding strategies and with minimal cueing demonstrated ability to use gestures, circumlocution and phonemic cues for word finding  Circumlocution and gestures were most effective  4  Patient will name an average of 15+ items in a category in 60 seconds over 5 trials using compensatory strategies and min cues to facilitate improved word retrieval skills, to be achieved in 4-6 weeks  Pt demonstrated significant difficulty generative naming by category (avg 9 items in 60 secs)  General word finding was targeted using circumlocution tasks and pt   Required max cueing to generate related words to a given word  Difficulties increased with fatigue  5  Patient will name an average of 10+ words that begin with a specific letter in 60 seconds over 5 trials using compensatory strategies and min cues to facilitate improved word retrieval skills, to be achieved in 4-6 weeks  NT    6  Patient will complete concrete and abstract categorization tasks to 80% accuracy to facilitate improved generative naming skills and working memory, to be achieved in 4-6 weeks  NT    7  Patient will complete deductive reasoning puzzles with 80% accuracy to facilitate increased working memory, problem solving, and processing skills, to be achieved in 4-6 weeks  Pt demonstrated significant difficulty with deduction tasks due to word finding and executive function difficulties  During this task it ws also noted that pt had difficulty with writing, often writing a different phonemically related word to that he was verbalizing (e g  April instead of apron)    8  Patient will complete thought organization tasks (e g , sequencing, deduction puzzles, etc ) with 80% accuracy to facilitate increased executive functioning skills, to be achieved in 4-6 weeks  NT    Plan:   -Patient was provided with home exercises/activities to target goals in plan of care at the end of today's session   -Continue with current plan of care

## 2018-09-25 NOTE — PROGRESS NOTES
Progress Note     Today's date: 2018  Patient name: Tiffany Gar  : 1960  MRN: [id number]  Referring provider: Selwyn Del Rosario MD  Dx:   Encounter Diagnosis     ICD-10-CM    1  Cerebrovascular accident (CVA), unspecified mechanism ([facility] [address] ) I63 9    2  Impaired functional mobility, balance, and endurance Z74 09        Subjective: Offers no complaints  Objective: See treatment diary below     Precautions: FALL risk, DM II, HTN, spinal stimulator (no stim), CDK, poor safety awareness, *Fear of Dogs*     Daily Treatment Diary      Manual                                                                                                                                                      Exercise Diary             Nustep L5, 10 min  L5, 10 min           STS               Static balance               Ambulation Solo, fwd/bwd 3 laps             Semi tandem                Sidestepping Solo, 3 laps  solo, 3 laps           Alternating step taps 6'', 2x10  6'', 2x10           Weaving in between cones Solo, 3 laps  solo, 3 laps            Stepping over hurdles Solo, fwd/lateral, 3 laps  Solo, fwd/lateral, 3 laps            Marching Solo, 3 laps  solo, 3 laps           Step ups 6'', Fwd, 2x10  solo, 6'', Fwd, 2x10            backwards walking   Solo, 3 laps                                                                                                                 Modalities                                                                                                    Assessment: Patient required cueing throughout for proper form with exercises  Also instructed in performing exercises slowly and with more control  Continues to require constant cueing to control momentum  Continues to have decreased safety awareness  Plan:  Progress treatment as tolerated

## 2018-09-25 NOTE — PROGRESS NOTES
Daily Note     Today's date: 2018  Patient name: Marisabel Gould  : 1960  MRN: [id number]  Referring provider: Norm Cullen MD  Dx:   Encounter Diagnosis   Name Primary?  History of ischemic cerebrovascular accident (CVA) with residual deficit Yes                  Subjective: "How many do I have to do "      Objective: See treatment below  UEB 5 minutes prograde and 5 minutes retrograde at 1 5 resistance  Engaged patient in [facility] "[employer] system for memory, midline crossing, and right sided awareness  Supine and seated neuro re-ed with 2# weighted bar  Assessment: Tolerated treatment well  Required verbal cues and redirection  Plan: Continued skilled OT per POC      INTERVENTION COMMENTS:  Diagnosis: History of ischemic cerebrovascular accident (CVA) with residual deficit [I69 30]  Precautions: fall risk, impulsive, /VM impairments   FOTO:  9 of 24 visits, PN due

## 2018-09-26 ENCOUNTER — APPOINTMENT (EMERGENCY)
Dept: RADIOLOGY | Facility: HOSPITAL | Age: 58
End: 2018-09-26
Payer: COMMERCIAL

## 2018-09-26 ENCOUNTER — APPOINTMENT (EMERGENCY)
Dept: CT IMAGING | Facility: HOSPITAL | Age: 58
End: 2018-09-26
Payer: COMMERCIAL

## 2018-09-26 ENCOUNTER — HOSPITAL ENCOUNTER (OUTPATIENT)
Facility: HOSPITAL | Age: 58
Setting detail: OBSERVATION
Discharge: HOME | End: 2018-09-27
Attending: EMERGENCY MEDICINE | Admitting: INTERNAL MEDICINE
Payer: COMMERCIAL

## 2018-09-26 DIAGNOSIS — E16.2 HYPOGLYCEMIA: Primary | ICD-10-CM

## 2018-09-26 DIAGNOSIS — R41.82 ALTERED MENTAL STATUS: ICD-10-CM

## 2018-09-26 DIAGNOSIS — E11.649 TYPE 2 DIABETES MELLITUS WITH HYPOGLYCEMIA WITHOUT COMA, WITH LONG-TERM CURRENT USE OF INSULIN (HCC): ICD-10-CM

## 2018-09-26 DIAGNOSIS — Z79.4 TYPE 2 DIABETES MELLITUS WITH HYPOGLYCEMIA WITHOUT COMA, WITH LONG-TERM CURRENT USE OF INSULIN (HCC): ICD-10-CM

## 2018-09-26 LAB
ALBUMIN SERPL BCP-MCNC: 3.4 G/DL (ref 3.5–5)
ALP SERPL-CCNC: 161 U/L (ref 46–116)
ALT SERPL W P-5'-P-CCNC: 32 U/L (ref 12–78)
ANION GAP SERPL CALCULATED.3IONS-SCNC: 10 MMOL/L (ref 4–13)
APTT PPP: 37 SECONDS (ref 24–36)
ARTERIAL PATENCY WRIST A: YES
AST SERPL W P-5'-P-CCNC: 17 U/L (ref 5–45)
BACTERIA UR QL AUTO: ABNORMAL /HPF
BASE EXCESS BLDA CALC-SCNC: -4.3 MMOL/L
BASOPHILS # BLD AUTO: 0.02 THOUSANDS/ΜL (ref 0–0.1)
BASOPHILS NFR BLD AUTO: 0 % (ref 0–1)
BILIRUB SERPL-MCNC: 0.5 MG/DL (ref 0.2–1)
BILIRUB UR QL STRIP: NEGATIVE
BUN SERPL-MCNC: 41 MG/DL (ref 5–25)
CALCIUM SERPL-MCNC: 9.1 MG/DL (ref 8.3–10.1)
CHLORIDE SERPL-SCNC: 105 MMOL/L (ref 100–108)
CLARITY UR: CLEAR
CO2 SERPL-SCNC: 24 MMOL/L (ref 21–32)
COLOR UR: YELLOW
CREAT SERPL-MCNC: 2.69 MG/DL (ref 0.6–1.3)
EOSINOPHIL # BLD AUTO: 0.26 THOUSAND/ΜL (ref 0–0.61)
EOSINOPHIL NFR BLD AUTO: 3 % (ref 0–6)
ERYTHROCYTE [DISTWIDTH] IN BLOOD BY AUTOMATED COUNT: 13.4 % (ref 11.6–15.1)
FINE GRAN CASTS URNS QL MICRO: ABNORMAL /LPF
GFR SERPL CREATININE-BSD FRML MDRD: 25 ML/MIN/1.73SQ M
GLUCOSE SERPL-MCNC: 142 MG/DL (ref 65–140)
GLUCOSE SERPL-MCNC: 143 MG/DL (ref 65–140)
GLUCOSE SERPL-MCNC: 153 MG/DL (ref 65–140)
GLUCOSE SERPL-MCNC: 158 MG/DL (ref 65–140)
GLUCOSE SERPL-MCNC: 44 MG/DL (ref 65–140)
GLUCOSE SERPL-MCNC: 53 MG/DL (ref 65–140)
GLUCOSE UR STRIP-MCNC: NEGATIVE MG/DL
HCO3 BLDA-SCNC: 20.8 MMOL/L (ref 22–28)
HCT VFR BLD AUTO: 38.7 % (ref 36.5–49.3)
HGB BLD-MCNC: 12.6 G/DL (ref 12–17)
HGB UR QL STRIP.AUTO: ABNORMAL
HIV 1+2 AB+HIV1 P24 AG SERPL QL IA: NORMAL
HIV1 P24 AG SER QL: NORMAL
IMM GRANULOCYTES # BLD AUTO: 0.05 THOUSAND/UL (ref 0–0.2)
IMM GRANULOCYTES NFR BLD AUTO: 1 % (ref 0–2)
INR PPP: 1.04 (ref 0.86–1.17)
KETONES UR STRIP-MCNC: NEGATIVE MG/DL
LEUKOCYTE ESTERASE UR QL STRIP: NEGATIVE
LYMPHOCYTES # BLD AUTO: 2.06 THOUSANDS/ΜL (ref 0.6–4.47)
LYMPHOCYTES NFR BLD AUTO: 23 % (ref 14–44)
MCH RBC QN AUTO: 28.3 PG (ref 26.8–34.3)
MCHC RBC AUTO-ENTMCNC: 32.6 G/DL (ref 31.4–37.4)
MCV RBC AUTO: 87 FL (ref 82–98)
MONOCYTES # BLD AUTO: 1 THOUSAND/ΜL (ref 0.17–1.22)
MONOCYTES NFR BLD AUTO: 11 % (ref 4–12)
NEUTROPHILS # BLD AUTO: 5.54 THOUSANDS/ΜL (ref 1.85–7.62)
NEUTS SEG NFR BLD AUTO: 62 % (ref 43–75)
NITRITE UR QL STRIP: NEGATIVE
NON VENT ROOM AIR: 21 %
NON-SQ EPI CELLS URNS QL MICRO: ABNORMAL /HPF
NRBC BLD AUTO-RTO: 0 /100 WBCS
O2 CT BLDA-SCNC: 16 ML/DL (ref 16–23)
OXYHGB MFR BLDA: 91.6 % (ref 94–97)
PCO2 BLDA: 38.1 MM HG (ref 36–44)
PH BLDA: 7.36 [PH] (ref 7.35–7.45)
PH UR STRIP.AUTO: 5 [PH] (ref 4.5–8)
PLATELET # BLD AUTO: 213 THOUSANDS/UL (ref 149–390)
PMV BLD AUTO: 10.5 FL (ref 8.9–12.7)
PO2 BLDA: 72.8 MM HG (ref 75–129)
POTASSIUM SERPL-SCNC: 4.1 MMOL/L (ref 3.5–5.3)
PROT SERPL-MCNC: 7.7 G/DL (ref 6.4–8.2)
PROT UR STRIP-MCNC: >=300 MG/DL
PROTHROMBIN TIME: 13.3 SECONDS (ref 11.8–14.2)
RBC # BLD AUTO: 4.46 MILLION/UL (ref 3.88–5.62)
RBC #/AREA URNS AUTO: ABNORMAL /HPF
SODIUM SERPL-SCNC: 139 MMOL/L (ref 136–145)
SP GR UR STRIP.AUTO: >=1.03 (ref 1–1.03)
SPECIMEN SOURCE: ABNORMAL
TROPONIN I SERPL-MCNC: <0.02 NG/ML
UROBILINOGEN UR QL STRIP.AUTO: 0.2 E.U./DL
WBC # BLD AUTO: 8.93 THOUSAND/UL (ref 4.31–10.16)
WBC #/AREA URNS AUTO: ABNORMAL /HPF

## 2018-09-26 PROCEDURE — 87806 HIV AG W/HIV1&2 ANTB W/OPTIC: CPT | Performed by: EMERGENCY MEDICINE

## 2018-09-26 PROCEDURE — 86803 HEPATITIS C AB TEST: CPT | Performed by: EMERGENCY MEDICINE

## 2018-09-26 PROCEDURE — 85025 COMPLETE CBC W/AUTO DIFF WBC: CPT | Performed by: EMERGENCY MEDICINE

## 2018-09-26 PROCEDURE — 71045 X-RAY EXAM CHEST 1 VIEW: CPT

## 2018-09-26 PROCEDURE — 82948 REAGENT STRIP/BLOOD GLUCOSE: CPT

## 2018-09-26 PROCEDURE — 99220 PR INITIAL OBSERVATION CARE/DAY 70 MINUTES: CPT | Performed by: PHYSICIAN ASSISTANT

## 2018-09-26 PROCEDURE — 80053 COMPREHEN METABOLIC PANEL: CPT | Performed by: EMERGENCY MEDICINE

## 2018-09-26 PROCEDURE — 84484 ASSAY OF TROPONIN QUANT: CPT | Performed by: EMERGENCY MEDICINE

## 2018-09-26 PROCEDURE — 36415 COLL VENOUS BLD VENIPUNCTURE: CPT | Performed by: EMERGENCY MEDICINE

## 2018-09-26 PROCEDURE — 96374 THER/PROPH/DIAG INJ IV PUSH: CPT

## 2018-09-26 PROCEDURE — 93005 ELECTROCARDIOGRAM TRACING: CPT

## 2018-09-26 PROCEDURE — 81001 URINALYSIS AUTO W/SCOPE: CPT

## 2018-09-26 PROCEDURE — 36600 WITHDRAWAL OF ARTERIAL BLOOD: CPT

## 2018-09-26 PROCEDURE — 99285 EMERGENCY DEPT VISIT HI MDM: CPT

## 2018-09-26 PROCEDURE — 70450 CT HEAD/BRAIN W/O DYE: CPT

## 2018-09-26 PROCEDURE — 85610 PROTHROMBIN TIME: CPT | Performed by: EMERGENCY MEDICINE

## 2018-09-26 PROCEDURE — 87340 HEPATITIS B SURFACE AG IA: CPT | Performed by: EMERGENCY MEDICINE

## 2018-09-26 PROCEDURE — 85730 THROMBOPLASTIN TIME PARTIAL: CPT | Performed by: EMERGENCY MEDICINE

## 2018-09-26 PROCEDURE — 82805 BLOOD GASES W/O2 SATURATION: CPT | Performed by: EMERGENCY MEDICINE

## 2018-09-26 RX ORDER — ASPIRIN 81 MG/1
81 TABLET ORAL DAILY
Status: DISCONTINUED | OUTPATIENT
Start: 2018-09-27 | End: 2018-09-27 | Stop reason: HOSPADM

## 2018-09-26 RX ORDER — FAMOTIDINE 20 MG/1
20 TABLET, FILM COATED ORAL DAILY
Status: DISCONTINUED | OUTPATIENT
Start: 2018-09-27 | End: 2018-09-27 | Stop reason: HOSPADM

## 2018-09-26 RX ORDER — AMLODIPINE BESYLATE 5 MG/1
5 TABLET ORAL EVERY 12 HOURS
Status: DISCONTINUED | OUTPATIENT
Start: 2018-09-26 | End: 2018-09-27 | Stop reason: HOSPADM

## 2018-09-26 RX ORDER — CALCIUM CARBONATE 200(500)MG
1000 TABLET,CHEWABLE ORAL DAILY PRN
Status: DISCONTINUED | OUTPATIENT
Start: 2018-09-26 | End: 2018-09-27 | Stop reason: HOSPADM

## 2018-09-26 RX ORDER — ONDANSETRON 2 MG/ML
4 INJECTION INTRAMUSCULAR; INTRAVENOUS EVERY 6 HOURS PRN
Status: DISCONTINUED | OUTPATIENT
Start: 2018-09-26 | End: 2018-09-27 | Stop reason: HOSPADM

## 2018-09-26 RX ORDER — ATORVASTATIN CALCIUM 40 MG/1
80 TABLET, FILM COATED ORAL
Status: DISCONTINUED | OUTPATIENT
Start: 2018-09-27 | End: 2018-09-27 | Stop reason: HOSPADM

## 2018-09-26 RX ORDER — LACTULOSE 20 G/30ML
20 SOLUTION ORAL DAILY PRN
Status: DISCONTINUED | OUTPATIENT
Start: 2018-09-26 | End: 2018-09-27 | Stop reason: HOSPADM

## 2018-09-26 RX ORDER — CHOLECALCIFEROL (VITAMIN D3) 125 MCG
1000 CAPSULE ORAL DAILY
Status: DISCONTINUED | OUTPATIENT
Start: 2018-09-27 | End: 2018-09-27 | Stop reason: HOSPADM

## 2018-09-26 RX ORDER — ACETAMINOPHEN 325 MG/1
650 TABLET ORAL EVERY 6 HOURS PRN
Status: DISCONTINUED | OUTPATIENT
Start: 2018-09-26 | End: 2018-09-27 | Stop reason: HOSPADM

## 2018-09-26 RX ORDER — GABAPENTIN 250 MG/5ML
600 SOLUTION ORAL
Status: DISCONTINUED | OUTPATIENT
Start: 2018-09-26 | End: 2018-09-27 | Stop reason: HOSPADM

## 2018-09-26 RX ORDER — CARVEDILOL 6.25 MG/1
6.25 TABLET ORAL 2 TIMES DAILY WITH MEALS
Status: DISCONTINUED | OUTPATIENT
Start: 2018-09-27 | End: 2018-09-27 | Stop reason: HOSPADM

## 2018-09-26 RX ORDER — INSULIN ASPART 100 [IU]/ML
38 INJECTION, SUSPENSION SUBCUTANEOUS
Status: DISCONTINUED | OUTPATIENT
Start: 2018-09-27 | End: 2018-09-27 | Stop reason: HOSPADM

## 2018-09-26 RX ORDER — DEXTROSE MONOHYDRATE 25 G/50ML
INJECTION, SOLUTION INTRAVENOUS
Status: DISPENSED
Start: 2018-09-26 | End: 2018-09-27

## 2018-09-26 RX ORDER — HEPARIN SODIUM 5000 [USP'U]/ML
5000 INJECTION, SOLUTION INTRAVENOUS; SUBCUTANEOUS EVERY 8 HOURS SCHEDULED
Status: DISCONTINUED | OUTPATIENT
Start: 2018-09-26 | End: 2018-09-27 | Stop reason: HOSPADM

## 2018-09-26 RX ORDER — INSULIN ASPART 100 [IU]/ML
40 INJECTION, SUSPENSION SUBCUTANEOUS EVERY MORNING
Status: DISCONTINUED | OUTPATIENT
Start: 2018-09-27 | End: 2018-09-27 | Stop reason: HOSPADM

## 2018-09-26 RX ORDER — DEXTROSE MONOHYDRATE 25 G/50ML
50 INJECTION, SOLUTION INTRAVENOUS ONCE
Status: COMPLETED | OUTPATIENT
Start: 2018-09-26 | End: 2018-09-26

## 2018-09-26 RX ADMIN — GABAPENTIN 600 MG: 250 SOLUTION ORAL at 22:31

## 2018-09-26 RX ADMIN — DEXTROSE MONOHYDRATE 50 ML: 25 INJECTION, SOLUTION INTRAVENOUS at 18:55

## 2018-09-26 RX ADMIN — Medication 4 G: at 18:38

## 2018-09-26 RX ADMIN — HEPARIN SODIUM 5000 UNITS: 5000 INJECTION, SOLUTION INTRAVENOUS; SUBCUTANEOUS at 22:31

## 2018-09-26 RX ADMIN — INSULIN LISPRO 1 UNITS: 100 INJECTION, SOLUTION INTRAVENOUS; SUBCUTANEOUS at 22:30

## 2018-09-26 RX ADMIN — AMLODIPINE BESYLATE 5 MG: 5 TABLET ORAL at 22:31

## 2018-09-26 NOTE — ED NOTES
Patient transported to [facility] Flynn Jiang RN  09/26/18 [id number]

## 2018-09-26 NOTE — ED PROVIDER NOTES
History  Chief Complaint   Patient presents with    Dizziness     Pt just had a recent stroke and wife noticed that when she went back into the car after leaving him to go grocery shopping, he was very diaphoretic and dizzy  History provided by:  Patient   used: No    Dizziness     Patient is a 55-year-old male presenting to emergency department with altered mental status, dizzy, diaphoretic  Started 5:30 p m  Maria Luz Muñoz Patient has a history of stroke, occurred 2 months ago  Was given tPA  Deficits of memory  No other deficits  This is 2nd episode of hypoglycemia in the last 2 weeks  Blood sugar in the 40s  Globally altered  Nonfocal   Moving all extremities  Bilateral upper extremity weakness  No facial deficits noted  MDM altered mental status, CT head, check cardiac workup, check UA, D50, re-evaluate        Prior to Admission Medications   Prescriptions Last Dose Informant Patient Reported? Taking? B-D INS SYRINGE 0 5CC/30GX1/2" 30G X 1/2" 0 5 ML MISC  Spouse/Significant Other Yes Yes   Blood Glucose Monitoring Suppl (ONE TOUCH ULTRA 2) w/Device KIT  Spouse/Significant Other No Yes   Sig: by Does not apply route 3 (three) times a day   Blood Pressure Monitoring (BLOOD PRESSURE CUFF) MISC  Spouse/Significant Other No Yes   Sig: Use to check blood pressure before taking blood pressure medication and 1 hour after and follow instructions provided in discharge instructions based on the readings     Incontinence Supplies (MALE URINAL) MISC   No Yes   Sig: by Does not apply route daily   amLODIPine (NORVASC) 5 mg tablet  Spouse/Significant Other No Yes   Sig: Take 1 tablet (5 mg total) by mouth every 12 (twelve) hours   aspirin (ECOTRIN LOW STRENGTH) 81 mg EC tablet  Spouse/Significant Other Yes Yes   Sig: Take 81 mg by mouth daily Resume on 8/14   atorvastatin (LIPITOR) 80 mg tablet  Spouse/Significant Other No Yes   Sig: Take 1 tablet (80 mg total) by mouth daily with dinner carvedilol (COREG) 6 25 mg tablet  Spouse/Significant Other No Yes   Sig: Take 1 tablet (6 25 mg total) by mouth 2 (two) times a day with meals   cholecalciferol 35916 units TABS   No Yes   Sig: Take 1 tablet (50,000 Units total) by mouth once a week   cyanocobalamin 1000 MCG tablet   No Yes   Sig: Take 1 tablet (1,000 mcg total) by mouth daily   diphenoxylate-atropine (LOMOTIL) 2 5-0 025 mg per tablet  Spouse/Significant Other Yes Yes   Sig: Take 1 tablet by mouth 4 (four) times a day as needed for diarrhea   famotidine (PEPCID) 20 mg tablet  Spouse/Significant Other Yes Yes   Sig: Take 20 mg by mouth daily Resume on    gabapentin (NEURONTIN) 250 mg/5 mL solution  Spouse/Significant Other No Yes   Sig: Take 12 mL (600 mg total) by mouth daily at bedtime   glucose 4 g chewable tablet  Spouse/Significant Other No Yes   Sig: Chew 3 tablets (12 g total) as needed for low blood sugar   glucose blood (ACCU-CHEK ACTIVE STRIPS) test strip  Spouse/Significant Other No Yes   Si each by Other route 3 (three) times a day Use as instructed   insulin aspart protamine-insulin aspart (NovoLOG 70/30) 100 units/mL injection  Spouse/Significant Other Yes Yes   Sig: Administer 40 U in AM and 38 U in evening or as directed   lactulose 20 g/30 mL  Spouse/Significant Other No Yes   Sig: Take 30 mL (20 g total) by mouth daily as needed (constipation)   neomycin-bacitracin-polymyxin b (NEOSPORIN) ointment  Spouse/Significant Other Yes Yes   Sig: Apply 1 application topically 2 (two) times a day Apply twice per day to shin wound until fully healed  If not fully healed in 5 days contact your family doctor   Next application is the evening of       Facility-Administered Medications: None       Past Medical History:   Diagnosis Date    Diabetes mellitus ([facility] [address] )     Hypercholesteremia     Hyperlipidemia     Hypertension     Neuropathy     Obesity     Osteomyelitis ([facility] [address] )     last assessed 16    PVC's (premature ventricular contractions)     sees cardiology Dr Vikash camargo    Stroke [facility])     last weeof July 2018 [address] [place] [address]       Past Surgical History:   Procedure Laterality Date    ABDOMINAL SURGERY      CHOLECYSTECTOMY      Percutaneous    OTHER SURGICAL HISTORY      "stimulator to control bowel movements"    NV ESOPHAGOGASTRODUODENOSCOPY TRANSORAL DIAGNOSTIC N/A 9/27/2016    Procedure: ESOPHAGOGASTRODUODENOSCOPY (EGD); Surgeon: Alma Vázquez MD;  [facility]: [facility]; Service: Gastroenterology    NV LAP,CHOLECYSTECTOMY N/A 2/29/2016    Procedure: LAPAROSCOPIC CHOLECYSTECTOMY ;  Surgeon: Carmen Montero DO;  Location[facility];  Service: General    ROTATOR CUFF REPAIR Right     TOE AMPUTATION Right 10/28/2016    Procedure: 3RD TOE AMPUTATION ;  Surgeon: Verl Soulier, DPM;  [facility];  Service:        Family History   Problem Relation Age of Onset    Leukemia Mother     Liver disease Mother     Lung cancer Mother         heavy smoker - 3 ppd    Heart disease Father     Liver disease Father     Multiple myeloma Sister     Breast cancer Sister     Urolithiasis Family     Alcohol abuse Neg Hx     Depression Neg Hx     Drug abuse Neg Hx     Substance Abuse Neg Hx      I have reviewed and agree with the history as documented  Social History   Substance Use Topics    Smoking status: Never Smoker    Smokeless tobacco: Never Used    Alcohol use No        Review of Systems   Unable to perform ROS: Mental status change   Neurological: Positive for dizziness  Physical Exam  Physical Exam   Constitutional: He appears well-developed and well-nourished  HENT:   Head: Normocephalic and atraumatic  Eyes: Pupils are equal, round, and reactive to light  Neck: Neck supple  Cardiovascular: Normal rate, regular rhythm, normal heart sounds and intact distal pulses  Pulmonary/Chest: Effort normal and breath sounds normal  No respiratory distress     Abdominal: Soft  Bowel sounds are normal  There is no tenderness  Musculoskeletal: Normal range of motion  He exhibits no edema, tenderness or deformity  Neurological: He is alert  A cranial nerve deficit is present  No sensory deficit  He exhibits normal muscle tone  Sleepy but arousable  Moving all extremities  Equal decreased strength in the upper right and lower extremities  Slight facial droop on the left which is old  Improving while in the ER as treating hypoglycemia   Skin: Skin is warm and dry  Capillary refill takes less than 2 seconds  No rash noted  No erythema  No pallor  Vitals reviewed        Vital Signs  ED Triage Vitals [09/26/18 1829]   Temperature Pulse Respirations Blood Pressure SpO2   97 5 °F (36 4 °C) 80 16 (!) 185/79 98 %      Temp Source Heart Rate Source Patient Position - Orthostatic VS BP Location FiO2 (%)   Oral Monitor Sitting Left arm --      Pain Score       No Pain           Vitals:    09/26/18 1957 09/26/18 2015 09/26/18 2100 09/26/18 2136   BP: (!) 177/82 150/82 147/79 150/74   Pulse: 67 66 66 70   Patient Position - Orthostatic VS: Lying Lying Sitting Sitting       Visual Acuity  Visual Acuity      Most Recent Value   L Pupil Size (mm)  3   R Pupil Size (mm)  3          ED Medications  Medications   amLODIPine (NORVASC) tablet 5 mg (5 mg Oral Given 9/26/18 2231)   aspirin (ECOTRIN LOW STRENGTH) EC tablet 81 mg (not administered)   atorvastatin (LIPITOR) tablet 80 mg (not administered)   carvedilol (COREG) tablet 6 25 mg (not administered)   cyanocobalamin (VITAMIN B-12) tablet 1,000 mcg (not administered)   famotidine (PEPCID) tablet 20 mg (not administered)   gabapentin (NEURONTIN) oral solution 600 mg (600 mg Oral Given 9/26/18 2231)   glucose chewable tablet 12 g (not administered)   insulin aspart protamine-insulin aspart (NovoLOG 70/30) 100 units/mL subcutaneous injection 40 Units (not administered)   lactulose 20 g/30 mL oral solution 20 g (not administered)   insulin aspart protamine-insulin aspart (NovoLOG 70/30) 100 units/mL subcutaneous injection 38 Units (not administered)   ondansetron (ZOFRAN) injection 4 mg (not administered)   calcium carbonate (TUMS) chewable tablet 1,000 mg (not administered)   acetaminophen (TYLENOL) tablet 650 mg (not administered)   insulin lispro (HumaLOG) 100 units/mL subcutaneous injection 1-6 Units (not administered)   insulin lispro (HumaLOG) 100 units/mL subcutaneous injection 1-6 Units (1 Units Subcutaneous Given 9/26/18 2230)   heparin (porcine) subcutaneous injection 5,000 Units (5,000 Units Subcutaneous Given 9/26/18 2231)   glucose 4 g chewable tablet **AcuDose Override Pull** (4 g  Given 9/26/18 1838)   dextrose 50 % IV solution 50 mL (50 mL Intravenous Given 9/26/18 1855)       Diagnostic Studies  Results Reviewed     Procedure Component Value Units Date/Time    Urine Microscopic [[id number]]  (Abnormal) Collected:  09/26/18 2128    Lab Status:  Final result Specimen:  Urine from Urine, Clean Catch Updated:  09/26/18 2141     RBC, UA 1-2 (A) /hpf      WBC, UA 1-2 (A) /hpf      Epithelial Cells Occasional /hpf      Bacteria, UA Occasional /hpf      Fine granular casts 0-1 /lpf     Fingerstick Glucose (POCT) [[id number]]  (Abnormal) Collected:  09/26/18 2120    Lab Status:  Final result Updated:  09/26/18 2125     POC Glucose 142 (H) mg/dl     Rapid HIV 1/2 AB-AG Combo [[id number]]  (Normal) Collected:  09/26/18 2028    Lab Status:  Final result Specimen:  Blood from Arm, Left Updated:  09/26/18 2117     Rapid HIV 1 AND 2 Non-Reactive     HIV-1 P24 Ag Screen Non-Reactive    Narrative:         Negative for HIV-1 p24 Antigen  Negative for HIV-1 and/or HIV-2 Antibody      ED Urine Macroscopic [[id number]]  (Abnormal) Collected:  09/26/18 2128    Lab Status:  Final result Specimen:  Urine Updated:  09/26/18 2116     Color, UA Yellow     Clarity, UA Clear     pH, UA 5 0     Leukocytes, UA Negative     Nitrite, UA Negative     Protein, UA >=300 (A) mg/dl      Glucose, UA Negative mg/dl      Ketones, UA Negative mg/dl      Urobilinogen, UA 0 2 E U /dl      Bilirubin, UA Negative     Blood, UA Moderate (A)     Specific Gravity, UA >=1 030    Narrative:       CLINITEK RESULT    Blood gas, arterial [[id number]]  (Abnormal) Collected:  09/26/18 2036    Lab Status:  Final result Specimen:  Blood, Arterial from Radial, Left Updated:  09/26/18 2047     pH, Arterial 7 355     pCO2, Arterial 38 1 mm Hg      pO2, Arterial 72 8 (L) mm Hg      HCO3, Arterial 20 8 (L) mmol/L      Base Excess, Arterial -4 3 mmol/L      O2 Content, Arterial 16 0 mL/dL      O2 HGB,Arterial  91 6 (L) %      SOURCE Radial, Left     APRYL TEST Yes     ROOM AIR FIO2 21 %     Narrative: Therapeutic levels (1 mg/mL and 2 mg/mL) of hydroxocobalamin may interfere with the fCOHb and fMetHb where it may cause lower than expected values    Hepatitis B surface antigen [[id number]] Collected:  09/26/18 2028    Lab Status: In process Specimen:  Blood from Arm, Left Updated:  09/26/18 2036    Hepatitis C antibody [[id number]] Collected:  09/26/18 2028    Lab Status:   In process Specimen:  Blood from Arm, Left Updated:  09/26/18 2036    Fingerstick Glucose (POCT) [[id number]]  (Abnormal) Collected:  09/26/18 1934    Lab Status:  Final result Updated:  09/26/18 1936     POC Glucose 153 (H) mg/dl     Comprehensive metabolic panel [[id number]]  (Abnormal) Collected:  09/26/18 1853    Lab Status:  Final result Specimen:  Blood from Arm, Right Updated:  09/26/18 1923     Sodium 139 mmol/L      Potassium 4 1 mmol/L      Chloride 105 mmol/L      CO2 24 mmol/L      ANION GAP 10 mmol/L      BUN 41 (H) mg/dL      Creatinine 2 69 (H) mg/dL      Glucose 53 (L) mg/dL      Calcium 9 1 mg/dL      AST 17 U/L      ALT 32 U/L      Alkaline Phosphatase 161 (H) U/L      Total Protein 7 7 g/dL      Albumin 3 4 (L) g/dL      Total Bilirubin 0 50 mg/dL      eGFR 25 ml/min/1 73sq m     Narrative:         National Kidney Disease Education Program recommendations are as follows:  GFR calculation is accurate only with a steady state creatinine  Chronic Kidney disease less than 60 ml/min/1 73 sq  meters  Kidney failure less than 15 ml/min/1 73 sq  meters      Troponin I [[id number]]  (Normal) Collected:  09/26/18 1853    Lab Status:  Final result Specimen:  Blood from Arm, Right Updated:  09/26/18 1920     Troponin I <0 02 ng/mL     Protime-INR [[id number]]  (Normal) Collected:  09/26/18 1853    Lab Status:  Final result Specimen:  Blood from Arm, Right Updated:  09/26/18 1914     Protime 13 3 seconds      INR 1 04    APTT [[id number]]  (Abnormal) Collected:  09/26/18 1853    Lab Status:  Final result Specimen:  Blood from Arm, Right Updated:  09/26/18 1914     PTT 37 (H) seconds     CBC and differential [[id number]] Collected:  09/26/18 1853    Lab Status:  Final result Specimen:  Blood from Arm, Right Updated:  09/26/18 1908     WBC 8 93 Thousand/uL      RBC 4 46 Million/uL      Hemoglobin 12 6 g/dL      Hematocrit 38 7 %      MCV 87 fL      MCH 28 3 pg      MCHC 32 6 g/dL      RDW 13 4 %      MPV 10 5 fL      Platelets 015 Thousands/uL      nRBC 0 /100 WBCs      Neutrophils Relative 62 %      Immat GRANS % 1 %      Lymphocytes Relative 23 %      Monocytes Relative 11 %      Eosinophils Relative 3 %      Basophils Relative 0 %      Neutrophils Absolute 5 54 Thousands/µL      Immature Grans Absolute 0 05 Thousand/uL      Lymphocytes Absolute 2 06 Thousands/µL      Monocytes Absolute 1 00 Thousand/µL      Eosinophils Absolute 0 26 Thousand/µL      Basophils Absolute 0 02 Thousands/µL     Fingerstick Glucose (POCT) [[id number]]  (Abnormal) Collected:  09/26/18 1849    Lab Status:  Final result Updated:  09/26/18 1856     POC Glucose 143 (H) mg/dl     Fingerstick Glucose (POCT) [[id number]]  (Abnormal) Collected:  09/26/18 1831    Lab Status:  Final result Updated:  09/26/18 1837     POC Glucose 44 (L) mg/dl                  XR chest 1 view portable   Final Result by Karen Garcia MD (09/26 1946)      No acute cardiopulmonary disease  Workstation performed: [id number]         CT head without contrast   Final Result by Karen Garcia MD (09/26 1929)      No acute intracranial abnormality  Chronic appearing left basal ganglia lacunar infarct  Mild chronic small vessel ischemic changes  Workstation performed: [id number]                    Procedures  Procedures       Phone Contacts  ED Phone Contact    ED Course  ED Course as of Sep 26 2301   Wed Sep 26, 2018   1903 Patient improving  More awake  Increasing strength in upper extremities bilaterally  1938 ECG shows rate of 66, sinus, normal axis, normal QRS, no significant ST or T-wave changes, independently interpreted by me    2030 Patient sleepy but easily arousable  Oxygen goes to 90%  Will get an ABG  MDM  CritCare Time    Disposition  Final diagnoses:   Hypoglycemia   Altered mental status     Time reflects when diagnosis was documented in both MDM as applicable and the Disposition within this note     Time User Action Codes Description Comment    9/26/2018  8:58 PM Bethanie Quinones [E16 2] Hypoglycemia     9/26/2018  8:58 PM Bethanie Quinones [R41 [id number]] Altered mental status       ED Disposition     ED Disposition Condition Comment    Admit  Case was discussed with medicine and the patient's admission status was agreed to be Admission Status: observation status to the service of Dr Francisco Russo   Follow-up Information    None         Current Discharge Medication List      CONTINUE these medications which have NOT CHANGED    Details   amLODIPine (NORVASC) 5 mg tablet Take 1 tablet (5 mg total) by mouth every 12 (twelve) hours  Qty: 60 tablet, Refills: 3    Associated Diagnoses: Essential hypertension; Benign hypertension with CKD (chronic kidney disease) stage III ([facility] [address] );  History of ischemic cerebrovascular accident (CVA) with residual deficit      aspirin (ECOTRIN LOW STRENGTH) 81 mg EC tablet Take 81 mg by mouth daily Resume on 8/14      atorvastatin (LIPITOR) 80 mg tablet Take 1 tablet (80 mg total) by mouth daily with dinner  Qty: 30 tablet, Refills: 3    Associated Diagnoses: Ischemic cerebrovascular accident (CVA) ([facility] [address] ); Mixed hyperlipidemia; Stage 3 chronic kidney disease ([facility] [address] ); History of ischemic cerebrovascular accident (CVA) with residual deficit      B-D INS SYRINGE 0 5CC/30GX1/2" 30G X 1/2" 0 5 ML MISC       Blood Glucose Monitoring Suppl (ONE TOUCH ULTRA 2) w/Device KIT by Does not apply route 3 (three) times a day  Qty: 1 each, Refills: 0    Associated Diagnoses: Type 2 diabetes mellitus with hyperglycemia, unspecified whether long term insulin use (HCC)      Blood Pressure Monitoring (BLOOD PRESSURE CUFF) MISC Use to check blood pressure before taking blood pressure medication and 1 hour after and follow instructions provided in discharge instructions based on the readings    Qty: 1 each, Refills: 0    Associated Diagnoses: Essential hypertension      carvedilol (COREG) 6 25 mg tablet Take 1 tablet (6 25 mg total) by mouth 2 (two) times a day with meals  Qty: 60 tablet, Refills: 3    Associated Diagnoses: Essential hypertension      cholecalciferol 68954 units TABS Take 1 tablet (50,000 Units total) by mouth once a week  Qty: 5 tablet, Refills: 5    Associated Diagnoses: Vitamin D deficiency      cyanocobalamin 1000 MCG tablet Take 1 tablet (1,000 mcg total) by mouth daily  Qty: 30 tablet, Refills: 5    Associated Diagnoses: B12 deficiency      diphenoxylate-atropine (LOMOTIL) 2 5-0 025 mg per tablet Take 1 tablet by mouth 4 (four) times a day as needed for diarrhea      famotidine (PEPCID) 20 mg tablet Take 20 mg by mouth daily Resume on 8/14      gabapentin (NEURONTIN) 250 mg/5 mL solution Take 12 mL (600 mg total) by mouth daily at bedtime  Qty: 470 mL, Refills: 2    Associated Diagnoses: Diabetic polyneuropathy associated with type 2 diabetes mellitus (HCC)      glucose 4 g chewable tablet Chew 3 tablets (12 g total) as needed for low blood sugar  Qty: 50 tablet, Refills: 0    Associated Diagnoses: Type 2 diabetes mellitus with stage 3 chronic kidney disease, with long-term current use of insulin (HCC)      glucose blood (ACCU-CHEK ACTIVE STRIPS) test strip 1 each by Other route 3 (three) times a day Use as instructed  Qty: 100 each, Refills: 3    Comments: *ACCU-CHEK TEST STRIPS, THANKS*  Associated Diagnoses: Type 2 diabetes mellitus with stage 3 chronic kidney disease, with long-term current use of insulin (HCC)      Incontinence Supplies (MALE URINAL) MISC by Does not apply route daily  Qty: 6 each, Refills: 3    Associated Diagnoses: History of ischemic cerebrovascular accident (CVA) with residual deficit; Diabetic polyneuropathy associated with type 2 diabetes mellitus ([facility] [address] ); Urine troubles      insulin aspart protamine-insulin aspart (NovoLOG 70/30) 100 units/mL injection Administer 40 U in AM and 38 U in evening or as directed  Qty: 10 mL, Refills: 0      lactulose 20 g/30 mL Take 30 mL (20 g total) by mouth daily as needed (constipation)  Qty: 473 mL, Refills: 0    Associated Diagnoses: Other constipation; Hepatic cirrhosis, unspecified hepatic cirrhosis type, unspecified whether ascites present (HCC)      neomycin-bacitracin-polymyxin b (NEOSPORIN) ointment Apply 1 application topically 2 (two) times a day Apply twice per day to shin wound until fully healed  If not fully healed in 5 days contact your family doctor  Next application is the evening of 8/13           No discharge procedures on file      ED Provider  Electronically Signed by           Hernan Cardoza MD  09/26/18 5653

## 2018-09-27 ENCOUNTER — APPOINTMENT (OUTPATIENT)
Dept: SPEECH THERAPY | Facility: CLINIC | Age: 58
End: 2018-09-27
Payer: COMMERCIAL

## 2018-09-27 ENCOUNTER — APPOINTMENT (OUTPATIENT)
Dept: PHYSICAL THERAPY | Facility: CLINIC | Age: 58
End: 2018-09-27
Payer: COMMERCIAL

## 2018-09-27 ENCOUNTER — TELEPHONE (OUTPATIENT)
Dept: INTERNAL MEDICINE CLINIC | Facility: CLINIC | Age: 58
End: 2018-09-27

## 2018-09-27 ENCOUNTER — APPOINTMENT (OUTPATIENT)
Dept: OCCUPATIONAL THERAPY | Facility: CLINIC | Age: 58
End: 2018-09-27
Payer: COMMERCIAL

## 2018-09-27 VITALS
RESPIRATION RATE: 16 BRPM | TEMPERATURE: 98.3 F | HEART RATE: 79 BPM | OXYGEN SATURATION: 96 % | DIASTOLIC BLOOD PRESSURE: 62 MMHG | SYSTOLIC BLOOD PRESSURE: 122 MMHG | HEIGHT: 70 IN | BODY MASS INDEX: 33.39 KG/M2 | WEIGHT: 233.25 LBS

## 2018-09-27 LAB
ANION GAP SERPL CALCULATED.3IONS-SCNC: 12 MMOL/L (ref 4–13)
ATRIAL RATE: 66 BPM
BUN SERPL-MCNC: 47 MG/DL (ref 5–25)
CALCIUM SERPL-MCNC: 9 MG/DL (ref 8.3–10.1)
CHLORIDE SERPL-SCNC: 105 MMOL/L (ref 100–108)
CO2 SERPL-SCNC: 22 MMOL/L (ref 21–32)
CREAT SERPL-MCNC: 2.47 MG/DL (ref 0.6–1.3)
ERYTHROCYTE [DISTWIDTH] IN BLOOD BY AUTOMATED COUNT: 13.2 % (ref 11.6–15.1)
GFR SERPL CREATININE-BSD FRML MDRD: 28 ML/MIN/1.73SQ M
GLUCOSE P FAST SERPL-MCNC: 157 MG/DL (ref 65–99)
GLUCOSE SERPL-MCNC: 122 MG/DL (ref 65–140)
GLUCOSE SERPL-MCNC: 140 MG/DL (ref 65–140)
GLUCOSE SERPL-MCNC: 157 MG/DL (ref 65–140)
HBV SURFACE AG SER QL: NORMAL
HCT VFR BLD AUTO: 34.3 % (ref 36.5–49.3)
HCV AB SER QL: NORMAL
HGB BLD-MCNC: 11.2 G/DL (ref 12–17)
MCH RBC QN AUTO: 28.4 PG (ref 26.8–34.3)
MCHC RBC AUTO-ENTMCNC: 32.7 G/DL (ref 31.4–37.4)
MCV RBC AUTO: 87 FL (ref 82–98)
P AXIS: 37 DEGREES
PLATELET # BLD AUTO: 161 THOUSANDS/UL (ref 149–390)
PMV BLD AUTO: 10.6 FL (ref 8.9–12.7)
POTASSIUM SERPL-SCNC: 3.9 MMOL/L (ref 3.5–5.3)
PR INTERVAL: 182 MS
QRS AXIS: 5 DEGREES
QRSD INTERVAL: 118 MS
QT INTERVAL: 444 MS
QTC INTERVAL: 465 MS
RBC # BLD AUTO: 3.95 MILLION/UL (ref 3.88–5.62)
SODIUM SERPL-SCNC: 139 MMOL/L (ref 136–145)
T WAVE AXIS: 26 DEGREES
VENTRICULAR RATE: 66 BPM
WBC # BLD AUTO: 6.86 THOUSAND/UL (ref 4.31–10.16)

## 2018-09-27 PROCEDURE — 82948 REAGENT STRIP/BLOOD GLUCOSE: CPT

## 2018-09-27 PROCEDURE — 93010 ELECTROCARDIOGRAM REPORT: CPT | Performed by: INTERNAL MEDICINE

## 2018-09-27 PROCEDURE — 99217 PR OBSERVATION CARE DISCHARGE MANAGEMENT: CPT | Performed by: INTERNAL MEDICINE

## 2018-09-27 PROCEDURE — 85027 COMPLETE CBC AUTOMATED: CPT | Performed by: PHYSICIAN ASSISTANT

## 2018-09-27 PROCEDURE — 80048 BASIC METABOLIC PNL TOTAL CA: CPT | Performed by: PHYSICIAN ASSISTANT

## 2018-09-27 RX ORDER — INSULIN ASPART 100 [IU]/ML
INJECTION, SUSPENSION SUBCUTANEOUS
Qty: 10 ML | Refills: 0 | Status: SHIPPED | OUTPATIENT
Start: 2018-09-27 | End: 2018-10-06

## 2018-09-27 RX ADMIN — AMLODIPINE BESYLATE 5 MG: 5 TABLET ORAL at 11:45

## 2018-09-27 RX ADMIN — INSULIN ASPART 40 UNITS: 100 INJECTION, SUSPENSION SUBCUTANEOUS at 08:37

## 2018-09-27 RX ADMIN — HEPARIN SODIUM 5000 UNITS: 5000 INJECTION, SOLUTION INTRAVENOUS; SUBCUTANEOUS at 06:21

## 2018-09-27 RX ADMIN — ASPIRIN 81 MG: 81 TABLET, COATED ORAL at 08:31

## 2018-09-27 RX ADMIN — CYANOCOBALAMIN TAB 500 MCG 1000 MCG: 500 TAB at 08:32

## 2018-09-27 RX ADMIN — FAMOTIDINE 20 MG: 20 TABLET ORAL at 08:32

## 2018-09-27 RX ADMIN — CARVEDILOL 6.25 MG: 6.25 TABLET, FILM COATED ORAL at 08:31

## 2018-09-27 RX ADMIN — HEPARIN SODIUM 5000 UNITS: 5000 INJECTION, SOLUTION INTRAVENOUS; SUBCUTANEOUS at 14:13

## 2018-09-27 NOTE — H&P
H&P- Alba Sanchez 1960, 62 y o  male MRN: [id number]    Unit/Bed#: -01 Encounter: [id number]    Primary Care Provider: Rick Raya DO   Date and time admitted to hospital: 9/26/2018  6:36 PM    Cognitive impairment   Assessment & Plan    · This is a result of patient's recent left basal ganglia stroke  · Patient currently is in PT and occupational therapy  · [facility] closely  · Supportive care  · If need to obtain information, please contact patient's wife or son        Benign hypertension with CKD (chronic kidney disease) stage III   Assessment & Plan    · Continue home blood pressure medications  · Appears that creatinine is at baseline        Mixed hyperlipidemia   Assessment & Plan    · Continue statin        * Type 2 diabetes mellitus with hypoglycemia, with long-term current use of insulin [facility])   Assessment & Plan    Lab Results   Component Value Date    HGBA1C 7 5 (H) 07/28/2018       Recent Labs      09/26/18   1831  09/26/18   1849  09/26/18   1934  09/26/18   2120   POCGLU  44*  143*  153*  142*       Blood Sugar Average: Last 72 hrs:  (P) 120 5   · Discussed with wife at bedside  She reports the patient on NovoLog 70/30 40 units in the morning and 30 units at night  She reports that he typically has good blood sugar control, ranging between 120-160  This is patient's 2nd hypoglycemic event in approximately 1 month  At that time, patient was dizzy, diaphoretic, unresponsive  She reports that today he ate chicken and salad for dinner  · This improved with IV dextrose  Patient's wife reports that she does have told at home to combat hypoglycemia    · Since receiving dextrose, patient is more alert and interactive  · As patient typically has good blood sugar control, I would hesitate to make any changes to his regimen as it sounds like this is related to decreased carbohydrate intake with dinner and taking insulin  · Encourage nutrition and having some carbohydrates with every meal   · Continue outpatient follow-up with PCP            VTE Prophylaxis: Heparin  / sequential compression device   Code Status:  Level 1 full code  POLST: POLST form is on file already (pre-hospital)  Discussion with family:  Wife at bedside    Anticipated Length of Stay:  Patient will be admitted on an Observation basis with an anticipated length of stay of  less than 2 midnights  Justification for Hospital Stay:  Monitoring glucose    Total Time for Visit, including Counseling / Coordination of Care: 30 minutes  Greater than 50% of this total time spent on direct patient counseling and coordination of care  Chief Complaint:   Altered mental status    History of Present Illness:    Joce Dixon is a 62 y o  male with multiple medical conditions including recent stroke, type 2 diabetes on insulin, hypertension, hyperlipidemia presents the ED for evaluation of altered mental status  Patient's wife reports that his insulin has recently been adjusted  She reports that she cooks dinner and breakfast and lunch for the patient  She reports that today for dinner he had grilled chicken and salad  She reports she gave him 38 units of NovoLog 70/30  Afterwards, patient and wife went to the grocery store  She reports that he was initially doing fine than when she came back and saw him he was diaphoretic, complaining of dizziness, and then suddenly became altered  Patient's wife was able to check his blood sugar by the time they got home it was 100  But time she arrived in the ED her blood sugar was 41 and patient was more altered  Patient received dextrose in the ED and mentation improved  Patient's wife reports that he follows a pretty moderate calorie diet  She states the last time he had a hypoglycemic episode was after he ate fish and salad for breakfast   She reports overall his blood sugars are well controlled, range between 120 and 160  Her PCP is managing insulin dosing      Review of Systems:    Review of Systems   Constitutional: Positive for diaphoresis  HENT: Negative  Eyes: Negative  Respiratory: Negative  Cardiovascular: Negative  Gastrointestinal: Negative  Musculoskeletal: Negative  Skin: Negative  Neurological: Positive for dizziness and weakness  Psychiatric/Behavioral: Positive for confusion  Past Medical and Surgical History:     Past Medical History:   Diagnosis Date    Diabetes mellitus ([facility] [address] )     Hypercholesteremia     Hyperlipidemia     Hypertension     Neuropathy     Obesity     Osteomyelitis ([facility] [address] )     last assessed 11/4/16    PVC's (premature ventricular contractions)     sees cardiology Dr Elaine camargo    [facility])     last weeof July 2018 [address] [facility]       Past Surgical History:   Procedure Laterality Date    ABDOMINAL SURGERY      CHOLECYSTECTOMY      Percutaneous    OTHER SURGICAL HISTORY      "stimulator to control bowel movements"    MI ESOPHAGOGASTRODUODENOSCOPY TRANSORAL DIAGNOSTIC N/A 9/27/2016    Procedure: ESOPHAGOGASTRODUODENOSCOPY (EGD); Surgeon: Manisha Nguyen MD;  [facility]: [facility]; Service: Gastroenterology    MI LAP,CHOLECYSTECTOMY N/A 2/29/2016    Procedure: LAPAROSCOPIC CHOLECYSTECTOMY ;  Surgeon: Mayda Simon DO;  Location: [facility];  Service: General    ROTATOR CUFF REPAIR Right     TOE AMPUTATION Right 10/28/2016    Procedure: 3RD TOE AMPUTATION ;  Surgeon: Danny Salcedo DPM;  Location[facility];  Service:        Meds/Allergies:    Prior to Admission medications    Medication Sig Start Date End Date Taking?  Authorizing Provider   amLODIPine (NORVASC) 5 mg tablet Take 1 tablet (5 mg total) by mouth every 12 (twelve) hours 9/10/18  Yes Raj Auguste DO   aspirin (ECOTRIN LOW STRENGTH) 81 mg EC tablet Take 81 mg by mouth daily Resume on 8/14   Yes Historical Provider, MD   atorvastatin (LIPITOR) 80 mg tablet Take 1 tablet (80 mg total) by mouth daily with dinner 9/10/18  Yes Joaquin Chowdary, DO   B-D INS SYRINGE 0 5CC/30GX1/2" 30G X 1/2" 0 5 ML MISC  9/20/18  Yes Historical Provider, MD   Blood Glucose Monitoring Suppl (ONE TOUCH ULTRA 2) w/Device KIT by Does not apply route 3 (three) times a day 8/14/18  Yes Joaquin Chowdary DO   Blood Pressure Monitoring (BLOOD PRESSURE CUFF) MISC Use to check blood pressure before taking blood pressure medication and 1 hour after and follow instructions provided in discharge instructions based on the readings   8/13/18  Yes Mireya Garsia MD   carvedilol (COREG) 6 25 mg tablet Take 1 tablet (6 25 mg total) by mouth 2 (two) times a day with meals 8/16/18  Yes Joaquin Chowdary DO   cholecalciferol 46514 units TABS Take 1 tablet (50,000 Units total) by mouth once a week 9/24/18  Yes LESLI WallNP   cyanocobalamin 1000 MCG tablet Take 1 tablet (1,000 mcg total) by mouth daily 9/24/18  Yes EnLESLI ChandlerNP   diphenoxylate-atropine (LOMOTIL) 2 5-0 025 mg per tablet Take 1 tablet by mouth 4 (four) times a day as needed for diarrhea   Yes Historical Provider, MD   famotidine (PEPCID) 20 mg tablet Take 20 mg by mouth daily Resume on 8/14   Yes Historical Provider, MD   gabapentin (NEURONTIN) 250 mg/5 mL solution Take 12 mL (600 mg total) by mouth daily at bedtime 8/24/18  Yes Joaquin Chowdary DO   glucose 4 g chewable tablet Chew 3 tablets (12 g total) as needed for low blood sugar 8/15/18  Yes Joaquin Chowdary DO   glucose blood (ACCU-CHEK ACTIVE STRIPS) test strip 1 each by Other route 3 (three) times a day Use as instructed 9/17/18  Yes Joaquin Chowdary DO   Incontinence Supplies (MALE URINAL) MISC by Does not apply route daily 9/24/18  Yes Raj Auguste DO   insulin aspart protamine-insulin aspart (NovoLOG 70/30) 100 units/mL injection Administer 40 U in AM and 38 U in evening or as directed 8/15/18  Yes Raj Auguste DO   lactulose 20 g/30 mL Take 30 mL (20 g total) by mouth daily as needed (constipation) 8/17/18  Yes Raj Auguste, DO   neomycin-bacitracin-polymyxin b (NEOSPORIN) ointment Apply 1 application topically 2 (two) times a day Apply twice per day to shin wound until fully healed  If not fully healed in 5 days contact your family doctor  Next application is the evening of 8/13   Yes Historical Provider, MD     I have reviewed home medications with patient personally  Allergies: Allergies   Allergen Reactions    Penicillins Other (See Comments) and Hives     Rash:jose miguel augmentin in past  Rash:jose miguel augmentin in past       Social History:     Marital Status: /Civil Union   Occupation:  Currently filing for disability  Patient Pre-hospital Living Situation:  Home with wife  Patient Pre-hospital Level of Mobility:  Limited secondary to recent stroke  Patient Pre-hospital Diet Restrictions:  Diabetic  Substance Use History:   History   Alcohol Use No     History   Smoking Status    Never Smoker   Smokeless Tobacco    Never Used     History   Drug Use No       Family History:    non-contributory    Physical Exam:     Vitals:   Blood Pressure: 147/79 (09/26/18 2100)  Pulse: 66 (09/26/18 2100)  Temperature: 97 5 °F (36 4 °C) (09/26/18 1829)  Temp Source: Oral (09/26/18 1829)  Respirations: 16 (09/26/18 2100)  SpO2: 92 % (09/26/18 2100)    Physical Exam   Constitutional: No distress  HENT:   Head: Normocephalic and atraumatic  Mouth/Throat: No oropharyngeal exudate  Eyes: Conjunctivae and EOM are normal  Pupils are equal, round, and reactive to light  Neck: No JVD present  Cardiovascular: Normal rate and regular rhythm  Exam reveals no gallop  No murmur heard  Pulmonary/Chest: Effort normal and breath sounds normal  No respiratory distress  He has no wheezes  He has no rales  Abdominal: Soft  Bowel sounds are normal  He exhibits no distension  There is no tenderness  There is no rebound  Musculoskeletal: He exhibits edema (b/l LE)  Neurological: He is alert  He displays normal reflexes  No cranial nerve deficit   He exhibits abnormal muscle tone (Weakness bilateral legs, baseline)  Oriented location at this time  Patient has mumbled speech  Skin: Skin is warm and dry  No rash noted  He is not diaphoretic  No erythema  Psychiatric: He has a normal mood and affect  His behavior is normal          Additional Data:     Lab Results: I have personally reviewed pertinent reports  Results from last 7 days  Lab Units 09/26/18  1853   WBC Thousand/uL 8 93   HEMOGLOBIN g/dL 12 6   HEMATOCRIT % 38 7   PLATELETS Thousands/uL 213   NEUTROS PCT % 62   LYMPHS PCT % 23   MONOS PCT % 11   EOS PCT % 3       Results from last 7 days  Lab Units 09/26/18  1853   SODIUM mmol/L 139   POTASSIUM mmol/L 4 1   CHLORIDE mmol/L 105   CO2 mmol/L 24   BUN mg/dL 41*   CREATININE mg/dL 2 69*   CALCIUM mg/dL 9 1   ALK PHOS U/L 161*   ALT U/L 32   AST U/L 17       Results from last 7 days  Lab Units 09/26/18  1853   INR  1 04       Results from last 7 days  Lab Units 09/26/18  2120 09/26/18  1934 09/26/18  1849 09/26/18  1831   POC GLUCOSE mg/dl 142* 153* 143* 44*           Imaging: I have personally reviewed pertinent reports  XR chest 1 view portable   Final Result by Karen Garcia MD (09/26 1946)      No acute cardiopulmonary disease  Workstation performed: [id number]         CT head without contrast   Final Result by Karen Garcia MD (09/26 1929)      No acute intracranial abnormality  Chronic appearing left basal ganglia lacunar infarct  Mild chronic small vessel ischemic changes  Workstation performed: [id number]             EKG, Pathology, and Other Studies Reviewed on Admission:   · EKG:  Normal sinus rhythm    Allscripts / Epic Records Reviewed: Yes     ** Please Note: This note has been constructed using a voice recognition system   **

## 2018-09-27 NOTE — ASSESSMENT & PLAN NOTE
Lab Results   Component Value Date    HGBA1C 7 5 (H) 07/28/2018       Recent Labs      09/26/18   1831  09/26/18   1849  09/26/18   1934  09/26/18 2120   POCGLU  44*  143*  153*  142*       Blood Sugar Average: Last 72 hrs:  (P) 120 5   · Discussed with wife at bedside  She reports the patient on NovoLog 70/30 40 units in the morning and 30 units at night  She reports that he typically has good blood sugar control, ranging between 120-160  This is patient's 2nd hypoglycemic event in approximately 1 month  At that time, patient was dizzy, diaphoretic, unresponsive  She reports that today he ate chicken and salad for dinner  · This improved with IV dextrose  Patient's wife reports that she does have told at home to combat hypoglycemia    · Since receiving dextrose, patient is more alert and interactive  · As patient typically has good blood sugar control, I would hesitate to make any changes to his regimen as it sounds like this is related to decreased carbohydrate intake with dinner and taking insulin  · Encourage nutrition and having some carbohydrates with every meal   · Continue outpatient follow-up with PCP

## 2018-09-27 NOTE — PLAN OF CARE
DISCHARGE PLANNING     Discharge to home or other facility with appropriate resources Adequate for Discharge        INFECTION - ADULT     Absence or prevention of progression during hospitalization Adequate for Discharge        Knowledge Deficit     Patient/family/caregiver demonstrates understanding of disease process, treatment plan, medications, and discharge instructions Adequate for Discharge        METABOLIC, FLUID AND ELECTROLYTES - ADULT     Glucose maintained within target range Adequate for Discharge        Potential for Falls     Patient will remain free of falls Adequate for Discharge        SAFETY ADULT     Maintain or return to baseline ADL function Adequate for Discharge     Maintain or return mobility status to optimal level Adequate for Discharge

## 2018-09-27 NOTE — PLAN OF CARE
DISCHARGE PLANNING     Discharge to home or other facility with appropriate resources Progressing        INFECTION - ADULT     Absence or prevention of progression during hospitalization Progressing        Knowledge Deficit     Patient/family/caregiver demonstrates understanding of disease process, treatment plan, medications, and discharge instructions Progressing        METABOLIC, FLUID AND ELECTROLYTES - ADULT     Glucose maintained within target range Progressing        Potential for Falls     Patient will remain free of falls Progressing        SAFETY ADULT     Maintain or return to baseline ADL function Progressing     Maintain or return mobility status to optimal level Progressing

## 2018-09-27 NOTE — CASE MANAGEMENT
Thank you,  [address]  Utilization Review Department  Phone: [phone]; Fax [phone]  ATTENTION: Please call with any questions or concerns to [phone]  and carefully follow the prompts so that you are directed to the right person  Send all requests for admission clinical reviews, approved or denied determinations and any other requests to fax [phone]  All voicemails are confidential    Initial Clinical Review    Admission: Date/Time/Statement: OBSERVATION 09/26/18 2058    Orders Placed This Encounter   Procedures    Place in Observation (expected length of stay for this patient is less than two midnights)     Standing Status:   Standing     Number of Occurrences:   1     Order Specific Question:   Admitting Physician     Answer:   Jose Guadalupe Alvares [[id number]]     Order Specific Question:   Level of Care     Answer:   Med Surg [16]         ED: Date/Time/Mode of Arrival:   ED Arrival Information     Expected Arrival Acuity Means of Arrival Escorted By Service Admission Type    - 9/26/2018 18:26 Emergent Walk-In Family Member Hospitalist Emergency    Arrival Complaint    DIZZINESS          Chief Complaint:   Chief Complaint   Patient presents with    Dizziness     Pt just had a recent stroke and wife noticed that when she went back into the car after leaving him to go grocery shopping, he was very diaphoretic and dizzy  History of Illness: 62 y o  male with multiple medical conditions including recent stroke, type 2 diabetes on insulin, hypertension, hyperlipidemia presents the ED for evaluation of altered mental status  Had two episodes of hypoglycemia with altered mental status with diaphoresis and dizziness  Esequiel Collins PCP managing dosing of insulin & historically his sugars are well controlled per wife  Pt has hsitory of stroke-2 months ago & was given tPA  Deficits of memory         ED Vital Signs:   ED Triage Vitals [09/26/18 1829]   Temperature Pulse Respirations Blood Pressure SpO2   97 5 °F (36 4 °C) 80 16 (!) 185/79 98 %      Temp Source Heart Rate Source Patient Position - Orthostatic VS BP Location FiO2 (%)   Oral Monitor Sitting Left arm --      Pain Score       No Pain        Wt Readings from Last 1 Encounters:   09/26/18 106 kg (233 lb 4 oz)       Vital Signs (abnormal): 09/26/2018 BP; 177/82, 185/79    09/27/2018 BP: 178/81        Abnormal Labs/Diagnostic Test Results: 09/26/2018: glucose 44,143,153,142,158  Arterial blood gas in room air:  pH, Arterial 7 355    pCO2, Arterial 38 1    pO2, Arterial 72 8     HCO3, Arterial 20 8     Base Excess, Arterial -4 3    O2 Content, Arterial 16 0    O2 HGB,Arterial  91 6       BUN 41     Creatinine 2 69     Glucose 53     Alkaline Phosphatase 161     Albumin 3 4, PTT 37, urinalysis blood moderate, protein >=300, RBC 1-2, WBC 1-2    09/27/2018 :  BUN 47     Creatinine 2 47     Glucose 157     hgb 11 2, hct 34 3    09/26: CT head without contrast  Chronic appearing left basal ganglia lacunar infarct  Mild chronic small vessel ischemic changes      ED Treatment:   Medication Administration from 09/26/2018 1826 to 09/26/2018 2134       Date/Time Order Dose Route Action Action by Comments     09/26/2018 1838 glucose 4 g chewable tablet **AcuDose Override Pull** 4 g  Given Sudeep Lane RN      09/26/2018 1855 dextrose 50 % IV solution 50 mL 50 mL Intravenous Given Bean Mueller RN           Past Medical/Surgical History:    Active Ambulatory Problems     Diagnosis Date Noted    Type 2 diabetes mellitus with hypoglycemia, with long-term current use of insulin ([facility] [address] ) 02/26/2016    Mixed hyperlipidemia 02/26/2016    Benign hypertension with CKD (chronic kidney disease) stage III 87/86/0980    Embolic stroke of left basal ganglia (HCC) 07/29/2018    Cognitive impairment 08/03/2018    Elevated alkaline phosphatase level 08/03/2018    CKD (chronic kidney disease) 08/03/2018    Diabetic macular edema (HCC) 08/09/2018    GERD (gastroesophageal reflux disease) 08/13/2018    Cirrhosis ([place] [address] ) 08/17/2016    Diabetic polyneuropathy associated with type 2 diabetes mellitus ([place] [address] ) 01/26/2016    Other constipation 08/17/2018    Abnormal EEG 09/24/2018     Resolved Ambulatory Problems     Diagnosis Date Noted    ADEOLA (acute kidney injury) ([place] [address] ) 02/26/2016    Intractable vomiting 02/26/2016    Dizziness 02/26/2016    Lactic acid acidosis 02/26/2016    Cholecystostomy care ([place] [address] ) 02/26/2016    Diabetic foot ulcer ([place] [address] ) 10/24/2016    Osteomyelitis of ankle or foot, right, acute ([place] [address] ) 10/29/2016    Acute-on-chronic renal failure ([place] [address] ) 10/29/2016    Intermittent diarrhea 03/12/2018    Other proteinuria 03/28/2018    Acute pain of left wrist 04/16/2018    Deformity of toenail 05/22/2018    Aphasia 07/29/2018     Past Medical History:   Diagnosis Date    Diabetes mellitus ([place] [address] )     Hypercholesteremia     Hyperlipidemia     Hypertension     Neuropathy     Obesity     Osteomyelitis ([place] [address] )     PVC's (premature ventricular contractions)     Stroke ([place] [address] )        Admitting Diagnosis: Dizziness [R42]  Altered mental status [R41 82]  Hypoglycemia [E16 2]    Age/Sex: 62 y o  male    Assessment/Plan:   Cognitive impairment   Assessment & Plan     · This is a result of patient's recent left basal ganglia stroke  · Patient currently is in PT and occupational therapy  · Fall Neurology closely  · Supportive care  · If need to obtain information, please contact patient's wife or son          Benign hypertension with CKD (chronic kidney disease) stage III   Assessment & Plan     · Continue home blood pressure medications  · Appears that creatinine is at baseline          Mixed hyperlipidemia   Assessment & Plan     · Continue statin          * Type 2 diabetes mellitus with hypoglycemia, with long-term current use of insulin ([place] [address] )   Assessment & Plan     Lab Results   Component Value Date     HGBA1C 7 5 (H) 07/28/2018                Recent Labs 09/26/18   1831  09/26/18   1849  09/26/18   1934  09/26/18 2120   POCGLU  44*  143*  153*  142*         Blood Sugar Average: Last 72 hrs:  (P) 120 5   · Discussed with wife at bedside  She reports the patient on NovoLog 70/30 40 units in the morning and 30 units at night  She reports that he typically has good blood sugar control, ranging between 120-160  This is patient's 2nd hypoglycemic event in approximately 1 month  At that time, patient was dizzy, diaphoretic, unresponsive  She reports that today he ate chicken and salad for dinner  · This improved with IV dextrose  Patient's wife reports that she does have told at home to combat hypoglycemia    · Since receiving dextrose, patient is more alert and interactive  · As patient typically has good blood sugar control, I would hesitate to make any changes to his regimen as it sounds like this is related to decreased carbohydrate intake with dinner and taking insulin  · Encourage nutrition and having some carbohydrates with every meal   · Continue outpatient follow-up with PCP                Admission Orders:  Scheduled Meds:   Current Facility-Administered Medications:  acetaminophen 650 mg Oral Q6H PRN Val Rodarte PA-C   amLODIPine 5 mg Oral Q12H Val Rodarte PA-C   aspirin 81 mg Oral Daily Val Rodarte PA-C   atorvastatin 80 mg Oral Daily With [employer] KIMBERLY Rodarte PA-C   calcium carbonate 1,000 mg Oral Daily PRN Val Rodarte PA-C   carvedilol 6 25 mg Oral BID With Meals Val Rodarte PA-C   cyanocobalamin 1,000 mcg Oral Daily Val Rodarte PA-C   famotidine 20 mg Oral Daily Val Rodarte PA-C   gabapentin 600 mg Oral HS Val Rodarte PA-C   glucose 12 g Oral PRN Val Rodarte PA-C   heparin (porcine) 5,000 Units Subcutaneous Q8H [address] Val Rodarte PA-C   insulin aspart protamine-insulin aspart 38 Units Subcutaneous Daily With Dinner Val Rodarte PA-C   insulin aspart protamine-insulin aspart 40 Units Subcutaneous QAM Val Rodarte PA-C   insulin lispro 1-6 Units Subcutaneous TID AC Val Rodarte PA-C   insulin lispro 1-6 Units Subcutaneous HS Val Rodarte PA-C   lactulose 20 g Oral Daily PRN Val Rodarte PA-C   ondansetron 4 mg Intravenous Q6H PRN Val Rodarte PA-C     Continuous Infusions:    PRN Meds:   24 hr telemetry  Peripheral IV  Mati/CHO controlled diet  Fingerstick glucose hs & before meals  Platelet count  Urine dip analyzer  scd

## 2018-09-27 NOTE — ASSESSMENT & PLAN NOTE
· This is a result of patient's recent left basal ganglia stroke  · Patient currently is in PT and occupational therapy  · Fall Neurology closely  · Supportive care  · If need to obtain information, please contact patient's wife or son

## 2018-09-27 NOTE — DISCHARGE INSTRUCTIONS
Hypoglycemia in a Person with Diabetes   WHAT YOU NEED TO KNOW:   Hypoglycemia is a serious condition that happens when your blood glucose (sugar) level drops too low  The blood sugar level is usually too high in a person with diabetes, but the level can also drop too low  It is important to follow your diabetes management plan to keep your blood sugar level steady  DISCHARGE INSTRUCTIONS:   Call 911 for any of the following:   · You have a seizure or pass out  · You feel you are going to pass out  · You have trouble thinking clearly  Seek care immediately if:  · Your blood sugar is less than 50 mg/dL and does not respond to treatment  Contact your healthcare provider if:   · You have had symptoms of low blood sugar several times  · You have questions about the amount of insulin or diabetes medicine you are taking  · You have questions or concerns about your condition or care  Medicines:   · Insulin or diabetes medicine  help to keep your blood sugar under control  · Glucagon  may be needed if you have severe hypoglycemia  · Take your medicine as directed  Contact your healthcare provider if you think your medicine is not helping or if you have side effects  Tell him or her if you are allergic to any medicine  Keep a list of the medicines, vitamins, and herbs you take  Include the amounts, and when and why you take them  Bring the list or the pill bottles to follow-up visits  Carry your medicine list with you in case of an emergency  Follow up with your healthcare provider as directed: You may need dose changes to your insulin or oral diabetes medicine if you have hypoglycemia  Write down your questions so you remember to ask them during your visits  Manage hypoglycemia:   · Check your blood sugar level right away if you have symptoms of hypoglycemia  Hypoglycemia is usually 70 mg/dL or below  Ask your healthcare provider what blood sugar level is too low for you      · If your blood sugar level is too low, eat or drink 15 grams of fast-acting carbohydrate  Examples of this amount of fast-acting carbohydrate are 4 ounces (½ cup) of fruit juice or 4 ounces of regular soda  Other examples are 2 tablespoons of raisins or 3 to 4 glucose tablets  Check your blood sugar level 15 minutes later  If the level is still low (less than 100 mg/dL), have another 15 grams of carbohydrate  When the level returns to 100 mg/dL, eat a snack or meal that contains carbohydrates  This will help prevent another drop in blood sugar  Always carefully follow your healthcare provider's instructions on how to treat low blood sugar levels  · Always carry a source of fast-acting carbohydrate  If you have symptoms of hypoglycemia and you do not have a blood glucose meter, have a source of fast-acting carbohydrate anyway  Avoid carbohydrate foods that are high in fat  The fat content may make it take longer to increase your blood sugar level  Ask your healthcare provider if you should carry a glucagon kit  Glucagon is a medicine that is injected when you develop severe hypoglycemia and become unconscious  Check the expiration date every month and replace it before it expires  · Teach others how to help you if you have symptoms of hypoglycemia  Tell them about the symptoms of hypoglycemia  Ask them to give you a source of fast-acting carbohydrate if you cannot get it yourself  Ask them to give you a glucagon injection if you have symptoms of hypoglycemia and you become unconscious or have a seizure  Ask them to call 911   This is an emergency  Tell them never to try to make you swallow anything if you faint or have a seizure  · Wear medical alert jewelry  or carry a card that says you have diabetes  Ask where to get these items  Prevent hypoglycemia:   · Take diabetes medicine as directed  Take your medicine at the right time and in the right amount   Your healthcare provider may change your blood sugar goals if you get hypoglycemia often  · Eat regular meals and snacks  Talk to your dietitian or healthcare provider about a meal plan that is right for you  Do not skip meals  · Check your blood sugar level as directed  Ask your healthcare provider what your blood sugar levels should be before and after you eat  Ask when and how often to check your blood sugar level  You may need to check at least 3 times each day  Record your blood sugar level results and take the record with you when you see your healthcare provider  Your provider may use the record to make changes to your medicine, food, or exercise schedules  · Check your blood sugar level before you exercise  Exercise can decrease your blood sugar level  If your blood sugar level is less than 100 mg/dL, have a carbohydrate snack  Examples are 4 to 6 crackers, ½ banana, 8 ounces (1 cup) of nonfat or 1% milk, or 4 ounces (½ cup) of juice  If you will exercise for more than 1 hour, you may need to check your blood sugar level every 30 minutes  Your healthcare provider may also recommend that you check your blood sugar level after exercise  · Be aware of how alcohol affects your blood sugar level  Alcohol can cause your blood sugar level to drop for up to 12 hours after drinking  Ask your healthcare provider if alcohol is safe for you  If you drink alcohol, always have a snack or meal at the same time  Women should limit alcohol to 1 drink a day  Men should limit alcohol to 2 drinks a day  A drink of alcohol is 12 ounces of beer, 5 ounces of wine, or 1½ ounces of liquor  © 2017 [facility] Information is for End User's use only and may not be sold, redistributed or otherwise used for commercial purposes  All illustrations and images included in CareNotes® are the copyrighted property of [employer] , [employer]  or Vahid Jacobo  The above information is an  only   It is not intended as medical advice for individual conditions or treatments  Talk to your doctor, nurse or pharmacist before following any medical regimen to see if it is safe and effective for you

## 2018-09-28 ENCOUNTER — TRANSITIONAL CARE MANAGEMENT (OUTPATIENT)
Dept: INTERNAL MEDICINE CLINIC | Facility: CLINIC | Age: 58
End: 2018-09-28

## 2018-09-28 NOTE — ASSESSMENT & PLAN NOTE
· Continue home blood pressure medications  · Mild increase in creatinine from baseline on admission which has resolved

## 2018-09-28 NOTE — ASSESSMENT & PLAN NOTE
Lab Results   Component Value Date    HGBA1C 7 5 (H) 07/28/2018       Recent Labs      09/26/18   2120  09/26/18   2147  09/27/18   0749  09/27/18   1058   POCGLU  142*  158*  140  122       Blood Sugar Average: Last 72 hrs:  (P) 128 [id number]   · No recurrence of hypoglycemia  · Discharge with decrease in before breakfast and before dinner doses by 2 units each  · Monitor blood sugars at home and follow-up with primary care physician with results

## 2018-09-28 NOTE — PROGRESS NOTES
Progress Note - Jr Lara 1960, 62 y o  male MRN: [id number]    Unit/Bed#: -01 Encounter: [id number]    Primary Care Provider: Kavitha Roche DO   Date and time admitted to hospital: 9/26/2018  6:36 PM        * Type 2 diabetes mellitus with hypoglycemia, with long-term current use of insulin [facility])   Assessment & Plan    Lab Results   Component Value Date    HGBA1C 7 5 (H) 07/28/2018       Recent Labs      09/26/18   2120  09/26/18   2147  09/27/18   0749  09/27/18   1058   POCGLU  142*  158*  140  122       Blood Sugar Average: Last 72 hrs:  (P) 128 [id number]   · No recurrence of hypoglycemia  · Discharge with decrease in before breakfast and before dinner doses by 2 units each  · Monitor blood sugars at home and follow-up with primary care physician with results     Cognitive impairment   Assessment & Plan    Supportive care       Benign hypertension with CKD (chronic kidney disease) stage III   Assessment & Plan    · Continue home blood pressure medications  · Mild increase in creatinine from baseline on admission which has resolved     Mixed hyperlipidemia   Assessment & Plan    · Continue statin           Patient Centered Rounds: I have performed bedside rounds with nursing staff today  Education and Discussions with Family / Patient: Discussed with wife on the phone    Time Spent for Care: 20 minutes  More than 50% of total time spent on counseling and coordination of care as described above  Current Length of Stay: 0 day(s)    Current Patient Status: Observation     Discharge Plan: Discharge home today    Subjective:   No recurrence of presenting symptoms  No further hypoglycemia  Mental status at baseline  No nausea, vomiting or diarrhea    Objective:     Physical Exam:     Physical Exam   HENT:   Head: Atraumatic  Eyes: EOM are normal    Neck: Neck supple  No JVD present  No tracheal deviation present  No thyromegaly present     Cardiovascular: Normal rate, regular rhythm and normal heart sounds  Pulmonary/Chest: Effort normal and breath sounds normal  No respiratory distress  He has no wheezes  He has no rales  Abdominal: Soft  Bowel sounds are normal  He exhibits no distension  There is no tenderness  There is no rebound  Neurological: He is alert  Cognitive impairment   Skin: Skin is warm and dry  Psychiatric: He has a normal mood and affect  Additional Data:     Labs:      Results from last 7 days  Lab Units 09/27/18  0512 09/26/18  1853   WBC Thousand/uL 6 86 8 93   HEMOGLOBIN g/dL 11 2* 12 6   HEMATOCRIT % 34 3* 38 7   PLATELETS Thousands/uL 161 213   NEUTROS PCT %  --  62   LYMPHS PCT %  --  23   MONOS PCT %  --  11   EOS PCT %  --  3       Results from last 7 days  Lab Units 09/27/18  0512 09/26/18  1853   SODIUM mmol/L 139 139   POTASSIUM mmol/L 3 9 4 1   CHLORIDE mmol/L 105 105   CO2 mmol/L 22 24   BUN mg/dL 47* 41*   CREATININE mg/dL 2 47* 2 69*   CALCIUM mg/dL 9 0 9 1   ALK PHOS U/L  --  161*   ALT U/L  --  32   AST U/L  --  17       Results from last 7 days  Lab Units 09/26/18  1853   INR  1 04       Results from last 7 days  Lab Units 09/27/18  1058 09/27/18  0749 09/26/18  2147 09/26/18  2120 09/26/18  1934 09/26/18  1849 09/26/18  1831   POC GLUCOSE mg/dl 122 140 158* 142* 153* 143* 44*             * I Have Reviewed All Lab Data Listed Above  * Additional Pertinent Lab Tests Reviewed:  All [facility] Admission Reviewed    Today, Patient Was Seen By: Rafael Morales MD

## 2018-09-28 NOTE — DISCHARGE SUMMARY
Discharge Summary - [address] [facility]    Patient Information: Natalee Escobedo 62 y o  male MRN: [id number]  Unit/Bed#: -01 Encounter: [id number]    Discharging Physician / Practitioner: Hazel Portillo MD  PCP: Trinity Gillespie DO  Admission Date: 9/26/2018  Discharge Date: 9/27/2018    Principal discharge diagnosis:  Type 2 diabetes with hypoglycemia    Secondary diagnoses:  1  Hypertension  2  Hyperlipidemia  3  Cognitive impairment  4  CKD-3    Procedures Performed:   1  Chest Xray - no acute abnormality  2  CT head - No acute intracranial abnormality  Chronic appearing left basal ganglia lacunar infarct  Mild chronic small vessel ischemic changes  Hospital Course:     Natalee Escobedo is a 62 y o  male patient who originally presented to the hospital on 9/26/2018 due to hypoglycemia  He usually takes Novolog 70/30 - 40 units before breakfast and 38 units before dinner  Shortly after dinner he complained of dizziness and was noted by his wife to have altered mental status and was diaphoretic  His blood sugar on arrival to the ED was 41 mg/dl  His symptoms resolved and his mental status returned to baseline with improvement in his blood sugar after receiving dextrose in the ED  He had a similar episode of hypoglycemia 2 weeks previously  He has a history of CKD-3 with a baseline creatinine of 2 2 to 2 5  His creatinine was increased from his baseline at 2 69 on presentation and improved to 2 4 on discharge  His hypoglycemia could have been triggered by mild worsening of his renal function  He had no recurrence of hypoglycemia during his hospital stay  He is being discharged on a lower dose of insulin - 38 units in the morning and 36 units before dinner  He has been advised to monitor his blood sugars and contact his primary care physician with the results       Condition at Discharge: stable     Discharge Day Visit / Exam:     * Please refer to separate progress for these details *    Discharge instructions/Information to patient and family:   See after visit summary for information provided to patient and family  Provisions for Follow-Up Care:  See after visit summary for information related to follow-up care and any pertinent home health orders  Disposition: Home    Planned Readmission: No    Discharge Statement:  I spent 30 minutes discharging the patient  This time was spent on the day of discharge  I had direct contact with the patient on the day of discharge  Greater than 50% of the total time was spent examining patient, answering all patient questions, arranging and discussing plan of care with patient as well as directly providing post-discharge instructions  Additional time then spent on discharge activities  Discharge Medications:  See after visit summary for reconciled discharge medications provided to patient and family

## 2018-10-01 DIAGNOSIS — I10 ESSENTIAL HYPERTENSION: Chronic | ICD-10-CM

## 2018-10-01 RX ORDER — CARVEDILOL 6.25 MG/1
6.25 TABLET ORAL 2 TIMES DAILY WITH MEALS
Qty: 60 TABLET | Refills: 2 | Status: SHIPPED | OUTPATIENT
Start: 2018-10-01 | End: 2018-10-15 | Stop reason: SDUPTHER

## 2018-10-01 NOTE — TELEPHONE ENCOUNTER
Message   Received: 3 days ago   Message Contents   MD Isaias Roblero DO             Both are fine, but I usually prefer Sertraline  Previous Messages      ----- Message -----   From: Isaias Arora DO   Sent: 9/28/2018   1:27 PM   To: Richard Gilliam MD     Hi Dr Daniella Pop your input -> is an SSRI such as lexapro or sertraline a better option for Tyra Okeefe given his stage 3-4 CKD? Raj   ----- Message -----   From: BRITTANY Jerome   Sent: 9/24/2018   4:25 PM   To: Toro Barrera Ricakeny Roque,     I had the pleasure of seeing Quinten Cosme in my office today for follow-up of his recent stroke  I know that you had recently seen him and were concerned about his depression, as am I  I saw in your note that you were considering either wellbutrin or lexapro, and just wanted to give my input on selection if you don't mind  While I do not believe that Mr Darline Reyes had a seizure, he did have a slightly abnormal EEG and that partnered with his recent stroke can lower his seizure threshold  For this reason, I would recommend avoiding wellbutrin, as wellbutrin can do the same  Any other SSRI would be an ok option  Please let me know if you have any questions or concerns       [address] [place] [address] [address],   [place]

## 2018-10-01 NOTE — TELEPHONE ENCOUNTER
Let patient know - I discussed with his kidney dr and recommend to start low dose sertraline, once a day at bedtime    If pt/wife are okay with starting rx, will order    Let me know

## 2018-10-02 ENCOUNTER — OFFICE VISIT (OUTPATIENT)
Dept: PHYSICAL THERAPY | Facility: CLINIC | Age: 58
End: 2018-10-02
Payer: COMMERCIAL

## 2018-10-02 ENCOUNTER — APPOINTMENT (OUTPATIENT)
Dept: OCCUPATIONAL THERAPY | Facility: CLINIC | Age: 58
End: 2018-10-02
Payer: COMMERCIAL

## 2018-10-02 ENCOUNTER — APPOINTMENT (OUTPATIENT)
Dept: SPEECH THERAPY | Facility: CLINIC | Age: 58
End: 2018-10-02
Payer: COMMERCIAL

## 2018-10-02 DIAGNOSIS — I63.9 CEREBROVASCULAR ACCIDENT (CVA), UNSPECIFIED MECHANISM (HCC): Primary | ICD-10-CM

## 2018-10-02 DIAGNOSIS — Z74.09 IMPAIRED FUNCTIONAL MOBILITY, BALANCE, AND ENDURANCE: ICD-10-CM

## 2018-10-02 PROCEDURE — 97112 NEUROMUSCULAR REEDUCATION: CPT | Performed by: PHYSICAL THERAPIST

## 2018-10-02 NOTE — PROGRESS NOTES
Progress Note     Today's date: 10/2/2018  Patient name: Scarlet Kearns  : 1960  MRN: [id number]  Referring provider: Stephan Keating MD  Dx:   Encounter Diagnosis     ICD-10-CM    1  Cerebrovascular accident (CVA), unspecified mechanism ([facility] [address] ) I63 9    2  Impaired functional mobility, balance, and endurance Z74 09        Subjective: Pt was recdntly hospitalized due to blood sugars being uncontrolled  He also is wearing boot due to wound on toes    Objective: See treatment diary below     Precautions: FALL risk, DM II, HTN, spinal stimulator (no stim), CDK, poor safety awareness, *Fear of Dogs*     Daily Treatment Diary      Manual                                                                                                                                                      Exercise Diary            Nustep L5, 10 min  L5, 10 min          STS               Static balance               Ambulation Solo, fwd/bwd 3 laps             Semi tandem                Sidestepping Solo, 3 laps  solo, 3 laps           Alternating step taps 6'', 2x10  6'', 2x10           Weaving in between cones Solo, 3 laps  solo, 3 laps            Stepping over hurdles Solo, fwd/lateral, 3 laps  Solo, fwd/lateral, 3 laps            Marching Solo, 3 laps  solo, 3 laps           Step ups 6'', Fwd, 2x10  solo, 6'', Fwd, 2x10            backwards walking   Solo, 3 laps                                                                                                                 Modalities                                                                                                    Assessment: RE only completed this session due to hospitalization  Pt has remained consistent compared to last re-evaluation with most all testing but improved with 6 MWT indicating he has improved with walking efficiency and endurance   PT continues to have deficits in strength, coordination, safety awareness, is at a high fall risk, and continues to have poor endurance and will continue to improve with physical therpay intervention  Plan:  Progress treatment as tolerated  STG - 4 weeks  1  BURGER > 37/56 - met   2  TUG < 20 sec - met   3  5 x STS < 12 sec - partially met  4  Complete 6 MWT - not MET (3 min only today)   5  Distant supervision during ambulation with RW and transfers - not met (close supervision)     LTG - 8 weeks  1  BURGER > or = 45/56 - progressing  2  Ambulate with no AD safely within household distances  - progressing  3   SSV = or > 0 8 m/s - met   4   Maintain balance up to 30 seconds in first two conditions of MCTSIB (FTEO/EC firm and foam) - progressing    Balance Test     6 Minute Walk Test (ft): 400ft w/ RW at 3 min, unsafe turns ; 5 min with RW 10/2 500 ft    2 Minute Walk Test (ft): Prev = 200 ft, unsafe turns   Gait Speed (ft/s): 7 sec, 0 87 m/s, Prev = 0 65 m/s w/ RW    5x Sit To Stand (s): 13 sec , Prev = 17 sec, no UE; 16 sec no UE 10/2   TU sec, prev = 28 sec w/ RW; 20 sec with RW 10/2   BURGER/56, prev = 29/56

## 2018-10-03 NOTE — PROGRESS NOTES
Speech-Language Pathology Re-Evaluation    Today's date: 10/4/2018  Patients name: Efe Haile  : 1960  MRN: [id number]  Safety measures: FALL risk, DM II, HTN, spinal stimulator (no stim), CDK, poor safety awareness, *Fear of Dogs*  Referring provider: Rahul Sewell MD    Subjective comments: "I'm weak " Pt arrived to therapy for his re-eval with his wife accompanying him through the evaluation  Wife reports pt has been calling the kids different names, as he gets confused easily; however, he does not have difficulty remembering his therapy appointments-although, pt repetitively asking when and with who regarding future appointments  Reminded wife again to bring in pictures with names on back to improve his functional memory  * Pt was recently hospitalized 2018 for hypoglycemia  While in hospital pt had the following tests done:  1  Chest Xray - no acute abnormality  2  CT head - No acute intracranial abnormality  Chronic appearing left basal ganglia lacunar infarct  Mild chronic small vessel ischemic changes  Patient's goal(s): "I Nathanel Model learn how to talk with the people, I want my memory back, and that's it "       Assessments    The Western Aphasia Battery-Revised (WAB-R) is designed to evaluate a patient's language function following CVA, dementia, or other acquired neurological disorder  It measures a patients linguistic skills, such as speech content, fluency, auditory comprehension, repetition, naming, reading, and writing  The WAB-R also measures a patients nonlinguistic skills, including drawing, calculation, block design, and apraxia   The purpose of this standardized assessment is to (1) determine the presence, severity, and type of aphasia; (2) measure the patient's level of performance to provide a baseline for detecting change over time; (3) provide a comprehensive assessment of the patient's language assets and deficits in order to guide treatment and management; and (4) infer the location and etiology of the lesion causing aphasia  The following results were obtained during the administration of the assessment:     PART 1: Score:   -Spontaneous Speech:    -Auditory Verbal Comprehension: 9 6/10   -Repetition: 8 8/10   -Naming & Word Findin 5/10       PART 2: Score:   -Reading Score:    -Writin                Score:   *Aphasia Quotient (AQ): 79 8/100   *Language Quotient (LQ): 73 8/100         *Patient named 6 concrete category members (animals) in 60 sec (norm=15+)  -- BELOW AVERAGE; DECREASE from IE on CLQT 2018  *Patient named 5 abstract category members (words beginning with letter 'm') in 60 sec (norm=10+)  -- BELOW AVERAGE perseverated x2; SLIGHT INCREASE from IE on CLQT 2018        Goals    1  Pt to complete full oral-mechanism examination to provide further baseline data for dysarthria goals  --MET   2  Patient will be educated on oral motor exercises and provided an appropriate at home exercise program to be practiced to facilitate improved strength and function for increased speech intelligibility  To be achieved in 4-6 weeks  --PARTIALLY MET; pt not completing independently at home  Speech still unintelligible at times c/b slurring of words  3  Patient will be educated on word finding strategies (i e , circumlocution) for improved generative naming and verbal expression skills  --PARTIALLY MET   4  Patient will name an average of 15+ items in a category in 60 seconds over 5 trials using compensatory strategies and min cues to facilitate improved word retrieval skills, to be achieved in 4-6 weeks  --PARTIALLY MET; D/C GOAL AT THIS TIME   5  Patient will name an average of 10+ words that begin with a specific letter in 60 seconds over 5 trials using compensatory strategies and min cues to facilitate improved word retrieval skills, to be achieved in 4-6 weeks  --PARTIALLY MET; D/C GOAL AT THIS TIME   6   Patient will complete concrete and abstract categorization tasks to 80% accuracy to facilitate improved generative naming skills and working memory, to be achieved in 4-6 weeks  --PARTIALLY MET    7  Patient will complete deductive reasoning puzzles with 80% accuracy to facilitate increased working memory, problem solving, and processing skills, to be achieved in 4-6 weeks  --NOT MET; D/C GOAL AT THIS TIME  8  Patient will complete thought organization tasks (e g , sequencing, deduction puzzles, etc ) with 80% accuracy to facilitate increased executive functioning skills, to be achieved in 4-6 weeks  --PARTIALLY MET; D/C GOAL AT THIS TIME    NEW GOALS:   1  Patient will be educated on the use of internal and external memory aids and compensatory strategies with 80% accuracy to facilitate increased recall of routine, personal information, and recent events, to be achieved in 4-6 weeks  2  Patient will complete auditory immediate and short term memory tasks to 80% accuracy to facilitate increased ability to retell narratives and recall information within functional living environment, to be achieved in 4-6 weeks  3  Patient will provide an adequate response to generative naming task (i e , name as many) with 80% accuracy to facilitate increased expressive language skills, to be achieved in 4-6 weeks  4  Patient will provide an adequate response to confrontation naming task (i e , what is) with 80% accuracy to facilitate increased expressive language skills, to be achieved in 4-6 weeks  5  Patient will name up to 5 features to describe a person/place/thing with 80% accuracy to facilitate improved utilization of circumlocution strategy, to be achieved in 4-6 weeks  6  Patient will complete picture description tasks using language organization strategies with 80% accuracy to facilitate improved utilization of circumlocution strategy, to be achieved in 4-6 weeks    7  Patient will complete reading comprehension tasks (e g , answering questions, following complex written directions, etc ) to 80% accuracy to facilitate carryover of comprehension of functional reading materials, to be achieved in 4-6 weeks  Long-term goals:  1  Patient will develop functional, cognitive-linguistic based skills and utilize compensatory strategies as needed to communicate effectively to different conversational partners, maintain safety, and participate socially by discharge  --PARTIALLY MET   2  Patient will demonstrate improved expressive and receptive language skills during structured and unstructured tasks by discharge  --PARTIALLY MET   3  Patient will independently utilize speech intelligibility strategies (e g , over-exaggeration, slow rate, breath groups, etc ) during oral reading tasks >90% intelligibility to facilitate increased generalization of skills into conversational speech by discharge  --PARTIALLY MET       Functional Limitations Reporting (G-codes):   Flowsheet Rows      Most Recent Value   SLP G-Codes   Assessment Type  Re-evaluation   Functional Limitations  Spoken language expressive   Spoken Language Expression Current Status ()  CJ   Spoken Language Expression Goal Status ()  CI            Impressions/Recommendations    Impressions: Patient continues to p/w moderate language and cognitive-linguistic deficits s/p CVA  Deficits include pt's reduced word finding, production of fluent/grammatically correct sentences in conversation, comprehension of reading written sentences, and difficulty with short term and immediate memory  Pt also appears to be dysarthric c/b slurred speech and occasional misarticulations during conversation  Pt has made some progress while coming to therapy  He continues to be motivated to attend therapy, however has difficulty completing HEP (i e  Dysarthria oral motor strengthening exercises or given worksheets)  His deficits are still affecting his functional communication   It is recommended pt continue coming to  2x a week, along with continuing his PT and OT services  Recommendations:  -Patient would benefit from outpatient skilled Speech Therapy services: Speech/ language therapy and Cognitive-Linguistic therapy  -Patient would benefit from psychological/counseling services  Pt appeared to be upset when we discussed his goals for ST  Pt hesitant to go to counseling, however after discussion, pt willing to trial 1x      -Frequency: 2x weekly  -Duration: 4-6 weeks    -Intervention certification from: 48/9/0595   -Intervention certification to: 45/96/4577     Visit Tracking:   -Referring provider: EPIC   -Billing guidelines: AMA  -Visit #12/24   -[facility]  ([place]-no auth;[place]     [facility]-auth post 24 visits)   -RE due 11/15/2018

## 2018-10-04 ENCOUNTER — EVALUATION (OUTPATIENT)
Dept: SPEECH THERAPY | Facility: CLINIC | Age: 58
End: 2018-10-04
Payer: COMMERCIAL

## 2018-10-04 ENCOUNTER — TELEPHONE (OUTPATIENT)
Dept: INTERNAL MEDICINE CLINIC | Facility: CLINIC | Age: 58
End: 2018-10-04

## 2018-10-04 ENCOUNTER — APPOINTMENT (OUTPATIENT)
Dept: SPEECH THERAPY | Facility: CLINIC | Age: 58
End: 2018-10-04
Payer: COMMERCIAL

## 2018-10-04 ENCOUNTER — OFFICE VISIT (OUTPATIENT)
Dept: PHYSICAL THERAPY | Facility: CLINIC | Age: 58
End: 2018-10-04
Payer: COMMERCIAL

## 2018-10-04 DIAGNOSIS — I63.9 EMBOLIC STROKE OF LEFT BASAL GANGLIA (HCC): ICD-10-CM

## 2018-10-04 DIAGNOSIS — I63.9 CEREBROVASCULAR ACCIDENT (CVA), UNSPECIFIED MECHANISM (HCC): Primary | ICD-10-CM

## 2018-10-04 DIAGNOSIS — R41.89 COGNITIVE IMPAIRMENT: Primary | ICD-10-CM

## 2018-10-04 DIAGNOSIS — I63.9 ISCHEMIC CEREBROVASCULAR ACCIDENT (CVA) (HCC): ICD-10-CM

## 2018-10-04 DIAGNOSIS — I69.322 DYSARTHRIA AS LATE EFFECT OF CEREBELLAR CEREBROVASCULAR ACCIDENT (CVA): ICD-10-CM

## 2018-10-04 DIAGNOSIS — I63.9 CEREBROVASCULAR ACCIDENT (CVA), UNSPECIFIED MECHANISM (HCC): ICD-10-CM

## 2018-10-04 DIAGNOSIS — E11.649 TYPE 2 DIABETES MELLITUS WITH HYPOGLYCEMIA WITHOUT COMA, WITH LONG-TERM CURRENT USE OF INSULIN (HCC): Primary | ICD-10-CM

## 2018-10-04 DIAGNOSIS — R48.8 OTHER SYMBOLIC DYSFUNCTIONS: ICD-10-CM

## 2018-10-04 DIAGNOSIS — Z79.4 TYPE 2 DIABETES MELLITUS WITH HYPOGLYCEMIA WITHOUT COMA, WITH LONG-TERM CURRENT USE OF INSULIN (HCC): Primary | ICD-10-CM

## 2018-10-04 DIAGNOSIS — Z74.09 IMPAIRED FUNCTIONAL MOBILITY, BALANCE, AND ENDURANCE: ICD-10-CM

## 2018-10-04 DIAGNOSIS — N18.30 STAGE 3 CHRONIC KIDNEY DISEASE (HCC): ICD-10-CM

## 2018-10-04 PROCEDURE — G9163 LANG EXPRESS GOAL STATUS: HCPCS

## 2018-10-04 PROCEDURE — 96105 ASSESSMENT OF APHASIA: CPT

## 2018-10-04 PROCEDURE — 97112 NEUROMUSCULAR REEDUCATION: CPT | Performed by: PHYSICAL THERAPIST

## 2018-10-04 PROCEDURE — 97110 THERAPEUTIC EXERCISES: CPT | Performed by: PHYSICAL THERAPIST

## 2018-10-04 PROCEDURE — G9162 LANG EXPRESS CURRENT STATUS: HCPCS

## 2018-10-04 NOTE — PROGRESS NOTES
Daily Note     Today's date: 10/4/2018  Patient name: Leo Khan  : 1960  MRN: [id number]  Referring provider: Aldo Hagen MD  Dx: No diagnosis found  Subjective: Pt reports that he has no restrictions for L foot except he must wear the boot  No new complaints  Objective: See treatment diary below    Precautions: FALL risk, DM II, HTN, spinal stimulator (no stim), CDK, poor safety awareness, *Fear of Dogs*     Daily Treatment Diary      Manual                                                                                                                                                      Exercise Diary  9/17  9/25  10/4         Nustep L5, 10 min  L5, 10 min  10 min          STS               Static balance      FAEC 30" x 2  FTEC 30" x 1         Ambulation Solo, fwd/bwd 3 laps            Semi tandem                Sidestepping Solo, 3 laps  solo, 3 laps           Alternating step taps 6'', 2x10  6'', 2x10 cone taps 30x         Weaving in between cones Solo, 3 laps  solo, 3 laps            Stepping over hurdles Solo, fwd/lateral, 3 laps  Solo, fwd/lateral, 3 laps  solo 3 laps ea           Marching Solo, 3 laps  solo, 3 laps           Step ups 6'', Fwd, 2x10  solo, 6'', Fwd, 2x10 solo 2 x 15          backwards walking   Solo, 3 laps solo 3 laps                                                                                                               Modalities                                                                                                       Assessment: Pt given less rest breaks and increased reps today to progress activity tolerance/endurance  Continues to demo poor safety awareness and judgement, however therapist gave pt immediate performance feedback which pt responded well to and was able to adjust his performance for next rep and demo improved performance  Patient would benefit from continued PT      Plan: Progress treatment as tolerated

## 2018-10-04 NOTE — TELEPHONE ENCOUNTER
Patients son called and patient has PT appt today @ 4pm   In order for patient to resume PT,OT and speech they need a script stating patient is able to resume therapy

## 2018-10-06 ENCOUNTER — APPOINTMENT (EMERGENCY)
Dept: RADIOLOGY | Facility: HOSPITAL | Age: 58
End: 2018-10-06
Payer: COMMERCIAL

## 2018-10-06 ENCOUNTER — HOSPITAL ENCOUNTER (EMERGENCY)
Facility: HOSPITAL | Age: 58
Discharge: HOME | End: 2018-10-06
Attending: EMERGENCY MEDICINE | Admitting: EMERGENCY MEDICINE
Payer: COMMERCIAL

## 2018-10-06 VITALS
TEMPERATURE: 98.1 F | HEART RATE: 70 BPM | BODY MASS INDEX: 33.86 KG/M2 | DIASTOLIC BLOOD PRESSURE: 74 MMHG | SYSTOLIC BLOOD PRESSURE: 148 MMHG | OXYGEN SATURATION: 100 % | WEIGHT: 236 LBS | RESPIRATION RATE: 20 BRPM

## 2018-10-06 DIAGNOSIS — R73.9 HIGH BLOOD SUGAR: Primary | ICD-10-CM

## 2018-10-06 LAB
ALBUMIN SERPL BCP-MCNC: 3.3 G/DL (ref 3.5–5)
ALP SERPL-CCNC: 136 U/L (ref 46–116)
ALT SERPL W P-5'-P-CCNC: 32 U/L (ref 12–78)
ANION GAP SERPL CALCULATED.3IONS-SCNC: 8 MMOL/L (ref 4–13)
AST SERPL W P-5'-P-CCNC: 24 U/L (ref 5–45)
BASOPHILS # BLD AUTO: 0.02 THOUSANDS/ΜL (ref 0–0.1)
BASOPHILS NFR BLD AUTO: 0 % (ref 0–1)
BILIRUB SERPL-MCNC: 0.5 MG/DL (ref 0.2–1)
BUN SERPL-MCNC: 41 MG/DL (ref 5–25)
CALCIUM SERPL-MCNC: 9.1 MG/DL (ref 8.3–10.1)
CHLORIDE SERPL-SCNC: 104 MMOL/L (ref 100–108)
CO2 SERPL-SCNC: 26 MMOL/L (ref 21–32)
CREAT SERPL-MCNC: 2.22 MG/DL (ref 0.6–1.3)
EOSINOPHIL # BLD AUTO: 0.15 THOUSAND/ΜL (ref 0–0.61)
EOSINOPHIL NFR BLD AUTO: 2 % (ref 0–6)
ERYTHROCYTE [DISTWIDTH] IN BLOOD BY AUTOMATED COUNT: 13.3 % (ref 11.6–15.1)
GFR SERPL CREATININE-BSD FRML MDRD: 31 ML/MIN/1.73SQ M
GLUCOSE SERPL-MCNC: 206 MG/DL (ref 65–140)
GLUCOSE SERPL-MCNC: 261 MG/DL (ref 65–140)
HCT VFR BLD AUTO: 37.6 % (ref 36.5–49.3)
HGB BLD-MCNC: 12 G/DL (ref 12–17)
IMM GRANULOCYTES # BLD AUTO: 0.03 THOUSAND/UL (ref 0–0.2)
IMM GRANULOCYTES NFR BLD AUTO: 0 % (ref 0–2)
LYMPHOCYTES # BLD AUTO: 1.82 THOUSANDS/ΜL (ref 0.6–4.47)
LYMPHOCYTES NFR BLD AUTO: 24 % (ref 14–44)
MAGNESIUM SERPL-MCNC: 2.2 MG/DL (ref 1.6–2.6)
MCH RBC QN AUTO: 28.3 PG (ref 26.8–34.3)
MCHC RBC AUTO-ENTMCNC: 31.9 G/DL (ref 31.4–37.4)
MCV RBC AUTO: 89 FL (ref 82–98)
MONOCYTES # BLD AUTO: 0.54 THOUSAND/ΜL (ref 0.17–1.22)
MONOCYTES NFR BLD AUTO: 7 % (ref 4–12)
NEUTROPHILS # BLD AUTO: 5.15 THOUSANDS/ΜL (ref 1.85–7.62)
NEUTS SEG NFR BLD AUTO: 67 % (ref 43–75)
NRBC BLD AUTO-RTO: 0 /100 WBCS
PLATELET # BLD AUTO: 184 THOUSANDS/UL (ref 149–390)
PMV BLD AUTO: 10.9 FL (ref 8.9–12.7)
POTASSIUM SERPL-SCNC: 5 MMOL/L (ref 3.5–5.3)
PROT SERPL-MCNC: 7.9 G/DL (ref 6.4–8.2)
RBC # BLD AUTO: 4.24 MILLION/UL (ref 3.88–5.62)
SODIUM SERPL-SCNC: 138 MMOL/L (ref 136–145)
TROPONIN I SERPL-MCNC: <0.02 NG/ML
WBC # BLD AUTO: 7.71 THOUSAND/UL (ref 4.31–10.16)

## 2018-10-06 PROCEDURE — 85025 COMPLETE CBC W/AUTO DIFF WBC: CPT | Performed by: EMERGENCY MEDICINE

## 2018-10-06 PROCEDURE — 84484 ASSAY OF TROPONIN QUANT: CPT | Performed by: EMERGENCY MEDICINE

## 2018-10-06 PROCEDURE — 82948 REAGENT STRIP/BLOOD GLUCOSE: CPT

## 2018-10-06 PROCEDURE — 83735 ASSAY OF MAGNESIUM: CPT | Performed by: EMERGENCY MEDICINE

## 2018-10-06 PROCEDURE — 71046 X-RAY EXAM CHEST 2 VIEWS: CPT

## 2018-10-06 PROCEDURE — 80053 COMPREHEN METABOLIC PANEL: CPT | Performed by: EMERGENCY MEDICINE

## 2018-10-06 PROCEDURE — 99285 EMERGENCY DEPT VISIT HI MDM: CPT

## 2018-10-06 PROCEDURE — 93005 ELECTROCARDIOGRAM TRACING: CPT

## 2018-10-06 PROCEDURE — 36415 COLL VENOUS BLD VENIPUNCTURE: CPT | Performed by: EMERGENCY MEDICINE

## 2018-10-06 RX ORDER — ONDANSETRON 4 MG/1
4 TABLET, ORALLY DISINTEGRATING ORAL EVERY 6 HOURS PRN
Qty: 20 TABLET | Refills: 0 | Status: SHIPPED | OUTPATIENT
Start: 2018-10-06 | End: 2019-07-09

## 2018-10-06 NOTE — DISCHARGE INSTRUCTIONS
ÇáÊÍßã Ýí ÏÇÁ ÇáÓßÑí ÎáÇá ÃíÇã ÇáãÑÖ   ãÇ íÌÈ Úáíß ãÚÑÝÊå:   ÅÏÇÑÉ íæã ÇáãÑÖ ÚÈÇÑÉ Úä ÎØÉ ÊÞæã ÈæÖÚåÇ ãÚ ãÞÏãí ÇáÑÚÇíÉ ÇáÕÍíÉ ááÊÍßã Ýí ãÓÊæì ÇáÓßÑ Ýí Ïãß ÚäÏãÇ ÊãÑÖ  ÞÏ íÑÊÝÚ ãÓÊæì ÇáÓßÑ Ýí ÇáÏã ÈÓÈÈ ÇáÖÛØ ÇáäÇÊÌ Úä ÇáãÑÖ Ãæ MZPTJMI Ãæ ÇáÅÕÇÈÉ  ÓÊÓÇÚÏ ÎØÊß Ýí ãäÚ ÇÑÊÝÇÚ ãÓÊæíÇÊ ÇáÓßÑ Ýí ÇáÏã æÍÏæË ÍÇáÇÊ ÕÍíÉ ÃÎÑì ÎØíÑÉ  ÅÑÔÇÏÇÊ ÇáÚáÇÌ ÎÇÑÌ ÇáãÓÊÔÝì:   ÇÊÕá ÈÇáÑÞã 911 Ýí ÍÇá ÍÏæË Ãí ããÇ íáí:   · ßäÊ ÊÚÇäí ãä ÕÚæÈÉ Ýí ÇáÊäÝÓ  íõÑÌì ØáÈ ÇáÑÚÇíÉ ÝæÑðÇ Ýí JCKPEZM ÇáÊÇáíÉ:   · ÚÏã ÇáÞÏÑÉ Úáì ÇáÇÍÊÝÇÙ ÈÇáØÚÇã æÇáÓæÇÆá Ýí ãÚÏÊß áÓÇÚÇÊ ÞáíáÉ  · ßäÊ ÊÚÇäí ãä ÕÚæÈÉ Ýí ÇáÊäÝÓ  · ßäÊ ÊÔÚÑ ÈÇáäÚÇÓ Ãæ SZFAXPBK  · ßäÊ ÊÊäÝÓ ÈÔßá ÃÓÑÚ ãä ÇáØÈíÚí  · ßÇä ãÚÏá ÖÑÈÇÊ ÞáÈß ÃÓÑÚ ãä ÇáãÚÏá ÇáØÈíÚí¡ Ãæ ÅÐÇ ßÇä ÞáÈß íÎÝÞ  · ÔÚÑÊ ÈÇáÖÚÝ Ãæ ÇáÏæÇÑ  íõÑÌì ZGYTCGI ÈãÞÏã ÇáÑÚÇíÉ ÇáÕÍíÉ áÏíß Ýí ÇáÍÇáÇÊ ÇáÊÇáíÉ:   · ÊÚÇäí ãä ÊÔäÌÇÊ ÚÖáíÉ Ýí ÇáÓÇÞ  · ÔÚÑÊ ÈÌÝÇÝ Ýí Ýãß Ãæ Úíäíß  · ßÇä áÏíß ÛËíÇä Ãæ ÅÓåÇá  · ÇáÅÕÇÈÉ ÈÇáÍãì     · ßÇä ãÓÊæì ÇáßíÊæäÇÊ WLPK ãä YUXEABQ ÇáãØáæÈ ÇáÐí ÃÎÈÑß Èå ãÞÏãæ ÇáÑÚÇíÉ ÇáÕÍíÉ  · ßÇä ãÓÊæì ÇáÓßÑ Ýí ÇáÏã YSABEL ãä JPBSIZE CQZVOWV ÇáÐí ÃÎÈÑß Èå ãÞÏãæ ÇáÑÚÇíÉ ÇáÕÍíÉ  · ÅÐÇ ßÇäÊ áÏíß QKJTOFJCS Ãæ ãÎÇæÝ ãÊÚáÞÉ ÈÍÇáÊß ÇáãÑÖíøÉ Ãæ ÈÇáÑÚÇíÉ  ÇáÃÏæíÉ:   · ÇáÅäÓæáíä Ãæ ÏæÇÁ áÚáÇÌ ãÑÖ ÇáÓßÑí  ÊÓÇÚÏ Úáì ÇáÊÍßã ÈãÓÊæì ÓßÑ ÇáÏã áÏíß  ÓíÎÈÑß ãÞÏã ÇáÑÚÇíÉ ÇáÕÍíÉ áÏíß ÚãÇ ÅÐÇ ßäÊ ÈÍÇÌÉ Åáì ÅÌÑÇÁ ÊÛííÑÇÊ Ýí ßíÝíÉ ÇÓÊÎÏÇã ÏæÇÁ ÇáÓßÑí Ãæ ÇáÅäÓæáíä  · ÊäÇæá ÇáÏæÇÁ æÝÞðÇ ááÅÑÔÇÏÇÊ  ÇÊÕá ÈãÞÏã ÇáÑÚÇíÉ ÇáÕÍíÉ áÏíß ÅÐÇ ßäÊ ÊÚÊÞÏ Ãä ÇáÏæÇÁ ÇáÐí RNFDFVA áÇ íÝíÏß Ãæ ÅÐÇ ßäÊ ÊÚÇäí ãä ÂËÇÑ ÌÇäÈíÉ  ÃÎÈÑå Ãæ ÃÎÈÑåÇ ÅÐÇ ßÇäÊ áÏíß ÍÓÇÓíÉ ãä Ãí ÏæÇÁ  ÇÍÊÝÙ ÈÞÇÆãÉ ÇáÃÏæíÉ æÇáÝíÊÇãíäÇÊ IMJEHLMZ ÇáÊí ÊÊäÇæáåÇ  ÃÏÑöÌ ÝíåÇ ÌÑÚÇÊ ÇáÃÏæíÉ æãæÇÚíÏåÇ æÓÈÈ ÊäÇæáåÇ  ÃÍÖöÑ ãÚß åÐå ÇáÞÇÆãÉ Ãæ ÚÈæÇÊ ÇáÃÞÑÇÕ ÚäÏ ÒíÇÑÇÊ ÇáãÊÇÈHOUSTON Englishing ÞÇÆãÉ ÇáÃÏæíÉ ãÚß ÊÍÓÈðÇ áÍÇáÇÊ ÇáØæÇÑÆ  ÊÇÈÚ ãÚ ãÞÏã ÇáÑÚÇíÉ ÇáÕÍíÉ áÏíß æÝÞðÇ GHVORSDQV:  íõÝÖá ÊÏæíä ILVWVVA ÍÊì áÇ íÊã äÓíÇäåÇ ÃËäÇÁ ÒíÇÑÉ ÇáØÈíÈ     ÇáæÇÌÈ Úáíß ÝÚáå Ýí ÃíÇã ÇáãÑÖ: · ÊÍÞÞ ãä ãÓÊæì ÇáÓßÑ ÈÇáÏã ÃßËÑ ãä ÇáãÚÊÇÏ  Ýí ÍÇáÉ ÇáÅÕÇÈÉ ÈÏÇÁ ÇáÓßÑí ãä ÇáäæÚ 2¡ ÊÍÞÞ ãä ÇáãÓÊæíÇÊ 4 ãÑÇÊ ßá íæã Úáì ÇáÃÞá  ÃãÇ ÅÐÇ ßäÊ ãÕÇÈðÇ ÈÇáÓßÑí äæÚ 1¡ ÝÇÝÍÕ ÇáãÓÊæì ßá 4 ÓÇÚÇÊ  · ÇÝÍÕ UAUOE Ãæ ÇáÏã áÏíß ááÊÃßÏ ãä ÚÏã æÌæÏ ÇáßíÊæäÇÊ  ÇÓÊÝÓÑ ãä ãÞÏã ÇáÑÚÇíÉ ÇáÕÍíÉ áÏíß Úä äæÚ ÇÎÊÈÇÑ ÇáßíÊæä ÇáÃäÓÈ áß  æÊÈÇÚ ãÌãæÚÇÊ ÇÎÊÈÇÑ ßíÊæäÇÊ ÇáÈæá Ýí ÇáÕíÏáíÇÊ æÝí ÈÚÖ ÇáãÊÇÌÑ  ßãÇ íãßäß ßÐáß ÔÑÇÁ ÃÌåÒÉ ÇáÞíÇÓ ÇáÊí ÊÞæã ÈÞíÇÓ ãÞÏÇÑ ÇáßíÊæäÇÊ Ýí ÇáÏã  ÇÓÊÝÓÑ Úä æÞÊ UCQASQ ãä ÇáßíÊæäÇÊ æÚÏÏ ãÑÇÊ ÇáÞíÇã ÈÐáß  áÇ ÊãÇÑÓ ÇáÑíÇÖÉ ÅÐÇ ßÇäÊ åäÇß ßíÊæäÇÊ Ýí Èæáß Ãæ Ïãß  · íõÑÌì ÊäÇæá DPBDYGQ ÍÓÈ MIDGTTNZZ  ÞÏ ÊÍÊÇÌ Åáì ÊäÇæá 8 ÃæäÕÇÊ (ßæÈ) ÊÞÑíÈðÇ ãä BBFAPBE Ýí ßá ÓÇÚÉ  ÇÔÑÈ IWAMMEB ÇáÊí áÇ ÊÍÊæí Úáì ÇáÓßÑ Ãæ ÇáßÇÝííä  ÇÓÊÝÓÑ ãä ãÞÏã ÇáÑÚÇíÉ ÇáÕÍíÉ áÏíß Úä WWRPLAG ÇáÃäÓÈ áß  · ÇÊÈÚ ÎØÉ ÇáæÌÈÇÊ ÇáÛÐÇÆíÉ ÇáãÚÊÇÏÉ áß ÈÃßÈÑ ÞÏÑ ããßä  æÅÐÇ áã Êßä ÊÓÊØíÚ RCYNIAAW ÈÎØÉ ÇáæÌÈÇÊ ÇáÛÐÇÆíÉ ÇáãæÖæÚÉ áß¡ ÝÊäÇæá ÇáÃØÚãÉ ÇáÃÎÑì ÇáÊí íÓåá Úáì ÌÓãß åÖãåÇ  ÅÐÇ ßäÊ JLTMLZ ßãíÉ ÃØÚãÉ ÃÞá ãä ÇáãÚÊÇÏ Ãæ ÅÐÇ ßÇä áÇ íãßäß ÊäÇæá Ãí ÃØÚãÉ¡ ÝÊäÇæá OBDARUS ÇáÊí ÊÍÊæí Úáì ÓÚÑÇÊ ÍÑÇÑíÉ  · ÃÎÈÑ ÇáÂÎÑíä Úä ÎØÉ íæã ÇáãÑÖ ÇáãæÖæÚÉ áß  ÃÎÈÑ ÇáÃÔÎÇÕ ÇáÂÎÑíä ÇáÐíä íÓÇÚÏæäß ÚäÏ ãÑÖß Úä ÎØÉ íæã ÇáãÑÖ ÇáÎÇÕÉ Èß  ÖÚ ÎØÊß Ýí ãßÇä íÓåá ÇáÚËæÑ Úáíå  íãßä Ãä íÊã ÊÛííÑ ÎØÉ ÇáãÑÖ ÇáÎÇÕÉ Èß ãÚ ãÑæÑ ÇáæÞÊ WGUMZSPJ Úáì ÇÍÊíÇÌÇÊß  ãÇ íÌÈ ÊäÇæáå æÔÑÈå ÚäÏ ÇáãÑÖ:  ÅÐÇ ßÇäÊ ãÚÏÊß ãÖØÑÈÉ Ãæ ÅÐÇ ßäÊ ÊÊÞíÃ¡ ÝÞÏ íÓåá ãÇ íáí ãä ÇáÔÑÈ Ãæ ÇáÃßá  íÍÊæí ßá äæÚ ãä ÃäæÇÚ ÇáÃØÚãÉ ÇáãÐßæÑÉ ÃÏäÇå Úáì ÍæÇáí 10 Åáì 15 ÌÑÇãðÇ ãä ÇáßÑÈæåíÏÑÇÊ    · HVRAHAR:      ¨ ãä ËáË Åáì äÕÝ ßæÈ ãä ÚÕíÑ ÇáÝÇßåÉ     ¨ äÕÝ ßæÈ ãä ÇáÕæÏÇ ÇáÚÇÏíÉ     ¨ ßæÈ æÇÍÏ ãä ÇáÍáíÈ     ¨ ãÕÇÕÉ æÇÍÏÉ ãÒÏæÌÉ ÇáÚÕÇ     ¨ ßæÈ æÇÍÏ ãä ÇáãÔÑæÈÇÊ ÇáÑíÇÖíÉ    · ÇáÃÛÐíÉ:      ¨ äÕÝ ßæÈ ãä ÇáÌíáÇÊíä ÇáÚÇÏí Ãæ ÇáãØåæ¡ Ãæ ÇáÍÈæÈ ÇáÓÇÎäÉ     ¨ äÕÝ ßæÈ ãä ÇáÈæÏäÌ ÇáÎÇáí ãä ÇáÓßÑ Ãæ ÑÈÚ ßæÈ ãä ÇáÈæÏäÌ ÇáÚÇÏí     ¨ äÕÝ ßæÈ ãä ÇáÈØÇØÓ ÇáãåÑæÓÉ Ãæ ÇáãßÑæäÉ     ¨ ¼ ßæÈ ãä ÇáÔÑÈÇÊ     ¨ ½ ßæÈ ãä ÇáÂíÓ ßÑíã ÇáÚÇÏí ¨ ÔÑíÍÉ MJWUL ãä ÇáÎÈÒ ÇáãÍãÕ ÇáÌÇÝ Ãæ 6 ÞØÚ ãä ASJZLGBUZ QLFPIKV Ãæ 3 ÞØÚ ãä ãÞÑãÔÇÊ ÌÑÇåÇã  © 2017 [address] is for End User's use only and may not be sold, redistributed or otherwise used for commercial purposes  All illustrations and images included in CareNotes® are the copyrighted property of [employer] M [employer]  or Vahid Jacobo  The above information is an  only  It is not intended as medical advice for individual conditions or treatments  Talk to your doctor, nurse or pharmacist before following any medical regimen to see if it is safe and effective for you

## 2018-10-06 NOTE — ED PROVIDER NOTES
Pt Name: Snow Dunbar  MRN: [id number]  [place] 1960  Age/Sex: 62 y o  male  Date of evaluation: 10/6/2018  PCP: Fritz Garcia, [address] [place] [address]    Chief Complaint   Patient presents with    Weakness - Generalized     pt states "I am sick"  pt wife states pt has complained of a headache, stomache pain, and weakness  pt tearful during triage  pt blood sugar this morning at home was over 300 and had one episode of vomiting  HPI    Brooke Beasley presents to the Emergency Department complaining of elevated blood sugar today  This morning he was complaining that he was not feeling well and had one episode of vomiting  He did eat and took his insulin but his blood sugar was rising and they were concerned  Now he is feeling well  HPI      Past Medical and Surgical History    Past Medical History:   Diagnosis Date    Diabetes mellitus ([facility] [address] )     Hypercholesteremia     Hyperlipidemia     Hypertension     Neuropathy     Obesity     Osteomyelitis ([facility] [address] )     last assessed 11/4/16    PVC's (premature ventricular contractions)     sees cardiology Dr Mahin camargo    [facility])     last weeof July 2018 [address] [facility]       Past Surgical History:   Procedure Laterality Date    ABDOMINAL SURGERY      CHOLECYSTECTOMY      Percutaneous    OTHER SURGICAL HISTORY      "stimulator to control bowel movements"    IA ESOPHAGOGASTRODUODENOSCOPY TRANSORAL DIAGNOSTIC N/A 9/27/2016    Procedure: ESOPHAGOGASTRODUODENOSCOPY (EGD); Surgeon: Zoe Arias MD;  Location: [facility];   Service: Gastroenterology    IA LAP,CHOLECYSTECTOMY N/A 2/29/2016    Procedure: LAPAROSCOPIC CHOLECYSTECTOMY ;  Surgeon: Gio Miller DO;  Location: [facility];  Service: General    ROTATOR CUFF REPAIR Right     TOE AMPUTATION Right 10/28/2016    Procedure: 3RD TOE AMPUTATION ;  Surgeon: Mauro Goodson DPM;  Location[facility];  Service:        Family History   Problem Relation Age of Onset    Leukemia Mother     Liver disease Mother     Lung cancer Mother         heavy smoker - 3 ppd    Heart disease Father     Liver disease Father     Multiple myeloma Sister     Breast cancer Sister     Urolithiasis Family     Alcohol abuse Neg Hx     Depression Neg Hx     Drug abuse Neg Hx     Substance Abuse Neg Hx        Social History   Substance Use Topics    Smoking status: Never Smoker    Smokeless tobacco: Never Used    Alcohol use No              Allergies    No Known Allergies    Home Medications    Prior to Admission medications    Medication Sig Start Date End Date Taking?  Authorizing Provider   amLODIPine (NORVASC) 5 mg tablet Take 1 tablet (5 mg total) by mouth every 12 (twelve) hours 9/10/18  Yes Harpreet Kramer DO   aspirin (ECOTRIN LOW STRENGTH) 81 mg EC tablet Take 81 mg by mouth daily Resume on 8/14   Yes Historical Provider, MD   atorvastatin (LIPITOR) 80 mg tablet Take 1 tablet (80 mg total) by mouth daily with dinner 9/10/18  Yes Harpreet Kramer DO   carvedilol (COREG) 6 25 mg tablet Take 1 tablet (6 25 mg total) by mouth 2 (two) times a day with meals 10/1/18  Yes Harpreet Kramer DO   cholecalciferol 11794 units TABS Take 1 tablet (50,000 Units total) by mouth once a week 9/24/18  Yes Will Blackwell, CRNP   cyanocobalamin 1000 MCG tablet Take 1 tablet (1,000 mcg total) by mouth daily 9/24/18  Yes Will Blackwell, CRNP   famotidine (PEPCID) 20 mg tablet Take 20 mg by mouth daily Resume on 8/14   Yes Historical Provider, MD   gabapentin (NEURONTIN) 250 mg/5 mL solution Take 12 mL (600 mg total) by mouth daily at bedtime 8/24/18  Yes Raj Auguste DO   glucose 4 g chewable tablet Chew 3 tablets (12 g total) as needed for low blood sugar 8/15/18  Yes Raj Auguset DO   insulin NPH-insulin regular (NovoLIN 70/30) 100 units/mL subcutaneous injection Inject under the skin 2 (two) times a day before meals 35 u in AM 30 U in PM   Yes Historical Provider, MD SCHUMACHER INS SYRINGE 0 5CC/30GX1/2" 30G X 1/2" 0 5 ML [facility]  9/20/18   Historical Provider, MD   Blood Glucose Monitoring Suppl (ONE TOUCH ULTRA 2) w/Device KIT by Does not apply route 3 (three) times a day 8/14/18   Raj Hutton DO   Blood Pressure Monitoring (BLOOD PRESSURE CUFF) MISC Use to check blood pressure before taking blood pressure medication and 1 hour after and follow instructions provided in discharge instructions based on the readings  8/13/18   Jamaica Sky MD   diphenoxylate-atropine (LOMOTIL) 2 5-0 025 mg per tablet Take 1 tablet by mouth 4 (four) times a day as needed for diarrhea    Historical Provider, MD   glucose blood (ACCU-CHEK ACTIVE STRIPS) test strip 1 each by Other route 3 (three) times a day Use as instructed 9/17/18   Raj Auguste DO   Incontinence Supplies (MALE URINAL) MISC by Does not apply route daily 9/24/18   Raj Auguste DO   lactulose 20 g/30 mL Take 30 mL (20 g total) by mouth daily as needed (constipation) 8/17/18   Raj Auguste DO   neomycin-bacitracin-polymyxin b (NEOSPORIN) ointment Apply 1 application topically 2 (two) times a day Apply twice per day to shin wound until fully healed  If not fully healed in 5 days contact your family doctor  Next application is the evening of 8/13    Historical Provider, MD   insulin aspart protamine-insulin aspart (NovoLOG 70/30) 100 units/mL injection Administer 38 U in AM and 36 U in evening or as directed  Patient taking differently: Administer 35 U in AM and 30 U in evening or as directed  9/27/18 10/6/18  Ibrahima Barney MD           Review of Systems    Review of Systems   Constitutional: Negative for activity change, appetite change, chills, fatigue and fever  HENT: Negative for congestion, rhinorrhea, sinus pressure, sneezing, sore throat and trouble swallowing  Eyes: Negative for photophobia and visual disturbance  Respiratory: Positive for cough  Negative for chest tightness, shortness of breath and wheezing  Cardiovascular: Negative for chest pain and leg swelling  Gastrointestinal: Positive for vomiting  Negative for abdominal distention, abdominal pain, constipation, diarrhea and nausea  Endocrine: Negative for polydipsia, polyphagia and polyuria  Genitourinary: Negative for decreased urine volume, difficulty urinating, dysuria, flank pain, frequency and urgency  Musculoskeletal: Negative for back pain, gait problem, joint swelling and neck pain  Skin: Negative for color change, pallor and rash  Allergic/Immunologic: Negative for immunocompromised state  Neurological: Positive for weakness and light-headedness  Negative for seizures, syncope, speech difficulty and headaches  Psychiatric/Behavioral: Negative for confusion  All other systems reviewed and are negative  Physical Exam      ED Triage Vitals [10/06/18 1324]   Temperature Pulse Respirations Blood Pressure SpO2   98 1 °F (36 7 °C) 70 20 148/74 100 %      Temp Source Heart Rate Source Patient Position - Orthostatic VS BP Location FiO2 (%)   Oral Monitor -- -- --      Pain Score       7               Physical Exam   Constitutional: He is oriented to person, place, and time  He appears well-developed and well-nourished  No distress  HENT:   Head: Normocephalic and atraumatic  Nose: Nose normal    Mouth/Throat: Oropharynx is clear and moist    Eyes: Pupils are equal, round, and reactive to light  Conjunctivae and EOM are normal    Neck: Normal range of motion  Neck supple  Cardiovascular: Normal rate, regular rhythm and normal heart sounds  Exam reveals no gallop and no friction rub  No murmur heard  Pulmonary/Chest: Effort normal and breath sounds normal  No respiratory distress  He has no wheezes  He has no rales  Abdominal: Soft  Bowel sounds are normal  There is no tenderness  There is no rebound and no guarding  Musculoskeletal: Normal range of motion  Feet:   Left Foot:   Skin Integrity: Positive for ulcer  Negative for erythema or warmth     Neurological: He is alert and oriented to person, place, and time  Skin: Skin is warm and dry  He is not diaphoretic  Psychiatric: He has a normal mood and affect  His behavior is normal    Nursing note and vitals reviewed  Assessment and Plan    Crsi Medina is a 62 y o  male who presents with vomiting  Physical examination remarkable for healing wound on left foot that is being followed by podiatry  Differential diagnosis (not completely inclusive) includes DDX includes but is not limited to: Dehydration, electrolyte abnormality, deconditioning,  infection, anemia, thyroid disorder, cardiac disorder    Plan will be to perform diagnostic testing and treat symptomatically  MDM    Diagnostic Results    EKG:  normal EKG, normal sinus rhythm, unchanged from previous tracings    EKG INTERPRETATION  EKG Interpretation    Rate: 71 BPM  Rhythm: sinus  Axis: normal  Intervals: Normal, no blocks, QTc 417ms  T waves: non-specific  ST segments: non-specific    Impression: normal EKG  EKG for comparison: reviewed  EKG interpreted by me  Interpretation by [employer], DO  EKG reviewed and interpreted independently      Labs:    Results for orders placed or performed during the hospital encounter of 10/06/18   CBC and differential   Result Value Ref Range    WBC 7 71 4 31 - 10 16 Thousand/uL    RBC 4 24 3 88 - 5 62 Million/uL    Hemoglobin 12 0 12 0 - 17 0 g/dL    Hematocrit 37 6 36 5 - 49 3 %    MCV 89 82 - 98 fL    MCH 28 3 26 8 - 34 3 pg    MCHC 31 9 31 4 - 37 4 g/dL    RDW 13 3 11 6 - 15 1 %    MPV 10 9 8 9 - 12 7 fL    Platelets 365 845 - 043 Thousands/uL    nRBC 0 /100 WBCs    Neutrophils Relative 67 43 - 75 %    Immat GRANS % 0 0 - 2 %    Lymphocytes Relative 24 14 - 44 %    Monocytes Relative 7 4 - 12 %    Eosinophils Relative 2 0 - 6 %    Basophils Relative 0 0 - 1 %    Neutrophils Absolute 5 15 1 85 - 7 62 Thousands/µL    Immature Grans Absolute 0 03 0 00 - 0 20 Thousand/uL    Lymphocytes Absolute 1 82 0 60 - 4 47 Thousands/µL    Monocytes Absolute 0 54 0 17 - 1 22 Thousand/µL    Eosinophils Absolute 0 15 0 00 - 0 61 Thousand/µL    Basophils Absolute 0 02 0 00 - 0 10 Thousands/µL   Comprehensive metabolic panel   Result Value Ref Range    Sodium 138 136 - 145 mmol/L    Potassium 5 0 3 5 - 5 3 mmol/L    Chloride 104 100 - 108 mmol/L    CO2 26 21 - 32 mmol/L    ANION GAP 8 4 - 13 mmol/L    BUN 41 (H) 5 - 25 mg/dL    Creatinine 2 22 (H) 0 60 - 1 30 mg/dL    Glucose 206 (H) 65 - 140 mg/dL    Calcium 9 1 8 3 - 10 1 mg/dL    AST 24 5 - 45 U/L    ALT 32 12 - 78 U/L    Alkaline Phosphatase 136 (H) 46 - 116 U/L    Total Protein 7 9 6 4 - 8 2 g/dL    Albumin 3 3 (L) 3 5 - 5 0 g/dL    Total Bilirubin 0 50 0 20 - 1 00 mg/dL    eGFR 31 ml/min/1 73sq m   Magnesium   Result Value Ref Range    Magnesium 2 2 1 6 - 2 6 mg/dL   Troponin I   Result Value Ref Range    Troponin I <0 02 <=0 04 ng/mL   Fingerstick Glucose (POCT)   Result Value Ref Range    POC Glucose 261 (H) 65 - 140 mg/dl       All labs reviewed and utilized in the medical decision making process    Radiology:    XR chest 2 views    (Results Pending)     NAD    All radiology studies independently viewed by me and interpreted by the radiologist     Procedure    Procedures    CritCare Time      ED Course of Care and Re-Assessments      Medications - No data to display        FINAL IMPRESSION    Final diagnoses:   High blood sugar         DISPOSITION/PLAN    Time reflects when diagnosis was documented in both MDM as applicable and the Disposition within this note     Time User Action Codes Description Comment    10/6/2018  4:04 PM Lauren Contreras Add [R73 9] High blood sugar       ED Disposition     ED Disposition Condition Comment    Discharge  [address] discharge to home/self care      Condition at discharge: Good        Follow-up Information     Follow up With Specialties Details Why Contact Info Additional [address], DO Internal Medicine Schedule an appointment as soon as possible for a visit  [address] [facility]  [address] [facility],[address]  [address] [facility] [address] [facility] Emergency Medicine Go to As needed, If symptoms worsen [address]  [phone] [facility], [address], [place], [place], [address], [address] Internal Medicine   [address]  [phone]               PATIENT REFERRED TO:    CRISTHIAN Maldonado 62  [address] [facility],[address]  [address] [address]  [phone]    Schedule an appointment as soon as possible for a visit      [address] [facility] [address]  [phone]  Go to  As needed, If symptoms worsen    Clifford Alonso DO  [address] [facility] [address]  [phone]            DISCHARGE MEDICATIONS:    Discharge Medication List as of 10/6/2018  4:06 PM      START taking these medications    Details   ondansetron (ZOFRAN-ODT) 4 mg disintegrating tablet Take 1 tablet (4 mg total) by mouth every 6 (six) hours as needed for nausea or vomiting, Starting Sat 10/6/2018, Normal         CONTINUE these medications which have NOT CHANGED    Details   amLODIPine (NORVASC) 5 mg tablet Take 1 tablet (5 mg total) by mouth every 12 (twelve) hours, Starting Mon 9/10/2018, Normal      aspirin (ECOTRIN LOW STRENGTH) 81 mg EC tablet Take 81 mg by mouth daily Resume on 8/14, Historical Med      atorvastatin (LIPITOR) 80 mg tablet Take 1 tablet (80 mg total) by mouth daily with dinner, Starting Mon 9/10/2018, Normal      carvedilol (COREG) 6 25 mg tablet Take 1 tablet (6 25 mg total) by mouth 2 (two) times a day with meals, Starting Mon 10/1/2018, Normal      cholecalciferol 04141 units TABS Take 1 tablet (50,000 Units total) by mouth once a week, Starting Mon 9/24/2018, Normal      cyanocobalamin 1000 MCG tablet Take 1 tablet (1,000 mcg total) by mouth daily, Starting Mon 9/24/2018, Normal      famotidine (PEPCID) 20 mg tablet Take 20 mg by mouth daily Resume on 8/14, Historical Med      gabapentin (NEURONTIN) 250 mg/5 mL solution Take 12 mL (600 mg total) by mouth daily at bedtime, Starting Fri 8/24/2018, Normal      glucose 4 g chewable tablet Chew 3 tablets (12 g total) as needed for low blood sugar, Starting Wed 8/15/2018, Normal      insulin NPH-insulin regular (NovoLIN 70/30) 100 units/mL subcutaneous injection Inject under the skin 2 (two) times a day before meals 35 u in AM 30 U in PM, Historical Med      B-D INS SYRINGE 0 5CC/30GX1/2" 30G X 1/2" 0 5 ML MISC Starting Thu 9/20/2018, Historical Med      Blood Glucose Monitoring Suppl (ONE TOUCH ULTRA 2) w/Device KIT by Does not apply route 3 (three) times a day, Starting Tue 8/14/2018, Normal      Blood Pressure Monitoring (BLOOD PRESSURE CUFF) MISC Use to check blood pressure before taking blood pressure medication and 1 hour after and follow instructions provided in discharge instructions based on the readings  , Print      diphenoxylate-atropine (LOMOTIL) 2 5-0 025 mg per tablet Take 1 tablet by mouth 4 (four) times a day as needed for diarrhea, Historical Med      glucose blood (ACCU-CHEK ACTIVE STRIPS) test strip 1 each by Other route 3 (three) times a day Use as instructed, Starting Mon 9/17/2018, Normal      Incontinence Supplies (MALE URINAL) MISC by Does not apply route daily, Starting Mon 9/24/2018, Print      lactulose 20 g/30 mL Take 30 mL (20 g total) by mouth daily as needed (constipation), Starting Fri 8/17/2018, Normal      neomycin-bacitracin-polymyxin b (NEOSPORIN) ointment Apply 1 application topically 2 (two) times a day Apply twice per day to shin wound until fully healed  If not fully healed in 5 days contact your family doctor  Next application is the evening of 8/13, Historical Med             No discharge procedures on file           Antonio Gould DO     Steve Woodall DO  10/06/18 1738

## 2018-10-09 ENCOUNTER — OFFICE VISIT (OUTPATIENT)
Dept: PHYSICAL THERAPY | Facility: CLINIC | Age: 58
End: 2018-10-09
Payer: COMMERCIAL

## 2018-10-09 ENCOUNTER — OFFICE VISIT (OUTPATIENT)
Dept: SPEECH THERAPY | Facility: CLINIC | Age: 58
End: 2018-10-09
Payer: COMMERCIAL

## 2018-10-09 ENCOUNTER — APPOINTMENT (OUTPATIENT)
Dept: OCCUPATIONAL THERAPY | Facility: CLINIC | Age: 58
End: 2018-10-09
Payer: COMMERCIAL

## 2018-10-09 DIAGNOSIS — R41.89 COGNITIVE IMPAIRMENT: ICD-10-CM

## 2018-10-09 DIAGNOSIS — I63.9 CEREBROVASCULAR ACCIDENT (CVA), UNSPECIFIED MECHANISM (HCC): Primary | ICD-10-CM

## 2018-10-09 DIAGNOSIS — R48.8 OTHER SYMBOLIC DYSFUNCTIONS: Primary | ICD-10-CM

## 2018-10-09 DIAGNOSIS — I63.9 ISCHEMIC CEREBROVASCULAR ACCIDENT (CVA) (HCC): ICD-10-CM

## 2018-10-09 DIAGNOSIS — Z74.09 IMPAIRED FUNCTIONAL MOBILITY, BALANCE, AND ENDURANCE: ICD-10-CM

## 2018-10-09 LAB
ATRIAL RATE: 71 BPM
P AXIS: 42 DEGREES
PR INTERVAL: 160 MS
QRS AXIS: 12 DEGREES
QRSD INTERVAL: 96 MS
QT INTERVAL: 384 MS
QTC INTERVAL: 417 MS
T WAVE AXIS: 47 DEGREES
VENTRICULAR RATE: 71 BPM

## 2018-10-09 PROCEDURE — 93010 ELECTROCARDIOGRAM REPORT: CPT | Performed by: INTERNAL MEDICINE

## 2018-10-09 PROCEDURE — 97116 GAIT TRAINING THERAPY: CPT | Performed by: PHYSICAL THERAPIST

## 2018-10-09 PROCEDURE — 97110 THERAPEUTIC EXERCISES: CPT | Performed by: PHYSICAL THERAPIST

## 2018-10-09 PROCEDURE — 92507 TX SP LANG VOICE COMM INDIV: CPT

## 2018-10-09 PROCEDURE — 97112 NEUROMUSCULAR REEDUCATION: CPT | Performed by: PHYSICAL THERAPIST

## 2018-10-09 NOTE — PROGRESS NOTES
Daily Speech Treatment Note    Today's date: 10/9/2018  Patients name: Cris Medina  : 1960  MRN: [id number]  Safety measures: CVA   Referring provider: Dino Gupta MD    Primary Diagnosis/Billing code: N31 8  Secondary Diagnosis/ Billing code: I63 9, I69 322     Visit Tracking:  -Referring provider: EPIC   -Billing guidelines: AMA  -Visit #   -[facility]  [facility]  ([place]-no auth;BOMN    [facility]-auth post 24 visits)   -RE due 11/15/2018    Subjective/Behavioral:    "I cry everyday, will I ever get better?!"     Objective/Assessment:  Patient arrived with no HEP  He was unclear when asked if he was given home activities  Short-term goals:    2  Patient will be educated on oral motor exercises and provided an appropriate at home exercise program to be practiced to facilitate improved strength and function for increased speech intelligibility  To be achieved in 4-6 weeks  --PARTIALLY MET; pt not completing independently at home  Speech still unintelligible at times c/b slurring of words  3  Patient will be educated on word finding strategies (i e , circumlocution) for improved generative naming and verbal expression skills  --PARTIALLY MET     6  Patient will complete concrete and abstract categorization tasks to 80% accuracy to facilitate improved generative naming skills and working memory, to be achieved in 4-6 weeks  --PARTIALLY MET      Opposite Naming: Patient was asked to provide one antonym for an given word (i e , up)  Patient completed naming in  opp, increasing to 43/ with min/mod cues  Add to the Category: [place] - Patient was given 3 words and asked to provide an additional semantic associated word  Patient completed in 10/22 opp  Category naming also encouraged and completed throughout with mod cueing  Naming/Matching "Everyday go-togethers": Patient was shown a field of  Pictures and was asked to match them based on associations (i e , frying pan/stove)   Patient also encouraged to name items as well to address aphasia  Task completed with 80% acc overall  NEW GOALS:   1  Patient will be educated on the use of internal and external memory aids and compensatory strategies with 80% accuracy to facilitate increased recall of routine, personal information, and recent events, to be achieved in 4-6 weeks  2  Patient will complete auditory immediate and short term memory tasks to 80% accuracy to facilitate increased ability to retell narratives and recall information within functional living environment, to be achieved in 4-6 weeks  3  Patient will provide an adequate response to generative naming task (i e , name as many) with 80% accuracy to facilitate increased expressive language skills, to be achieved in 4-6 weeks  4  Patient will provide an adequate response to confrontation naming task (i e , what is) with 80% accuracy to facilitate increased expressive language skills, to be achieved in 4-6 weeks  5  Patient will name up to 5 features to describe a person/place/thing with 80% accuracy to facilitate improved utilization of circumlocution strategy, to be achieved in 4-6 weeks  6  Patient will complete picture description tasks using language organization strategies with 80% accuracy to facilitate improved utilization of circumlocution strategy, to be achieved in 4-6 weeks  7  Patient will complete reading comprehension tasks (e g , answering questions, following complex written directions, etc ) to 80% accuracy to facilitate carryover of comprehension of functional reading materials, to be achieved in 4-6 weeks  Plan:   -Patient was provided with home exercises/activities to target goals in plan of care at the end of today's session   -Continue with current plan of care

## 2018-10-09 NOTE — PROGRESS NOTES
Daily Note     Today's date: 10/9/2018  Patient name: Akash Woodall  : 1960  MRN: [id number]  Referring provider: Martha Tsai MD  Dx:   Encounter Diagnosis     ICD-10-CM    1  Cerebrovascular accident (CVA), unspecified mechanism ([facility] [address] ) I63 9    2  Impaired functional mobility, balance, and endurance Z74 09                   Subjective: Pt went to the ER on 10/6/18 due to high blood sugar  States that he did not stay over night  Objective: See treatment diary below    Precautions: FALL risk, DM II, HTN, spinal stimulator (no stim), CDK, poor safety awareness, *Fear of Dogs*     Daily Treatment Diary      Manual                                                                                                                                                      Exercise Diary  9/17  9/25  10/4  10/9       Nustep L5, 10 min  L5, 10 min  10 min   L5, 10 min       STS               Static balance     FAEC 30" x 2  FTEC 30" x 1  FTEC, firm 30"x3  FTEO, foam 30" x 3       Ambulation Solo, fwd/bwd 3 laps      CL S, no  ft       Semi tandem                Sidestepping Solo, 3 laps  solo, 3 laps           Alternating step taps 6'', 2x10  6'', 2x10 cone taps 30x         Weaving in between cones Solo, 3 laps  solo, 3 laps    3 laps        Stepping over hurdles Solo, fwd/lateral, 3 laps  Solo, fwd/lateral, 3 laps  solo 3 laps ea   2 laps        Marching Solo, 3 laps  solo, 3 laps           Step ups 6'', Fwd, 2x10  solo, 6'', Fwd, 2x10 solo 2 x 15 15x        backwards walking   Solo, 3 laps solo 3 laps         Ambulation with 360 deg         2 laps                                                                                             Modalities                                                                                                             Assessment: Performed all exercises without solo and AD today  Had one incident where he became tearful due being depressed over his current condition  Overall, pt was less impulsive today and was able to perform exercises slower and safer without verbal cueing  Also demo improved static balance today  Pt was easily distracted and required occasional redirection to task at hand  Does demo moderate instability during turns with and without RW  Pt responded well to performance feedback and able to recognize his own errors  Patient would benefit from continued PT      Plan: Progress treatment as tolerated  Continue with performance feedback and learning from error/feedback

## 2018-10-11 ENCOUNTER — OFFICE VISIT (OUTPATIENT)
Dept: SPEECH THERAPY | Facility: CLINIC | Age: 58
End: 2018-10-11
Payer: COMMERCIAL

## 2018-10-11 ENCOUNTER — APPOINTMENT (OUTPATIENT)
Dept: OCCUPATIONAL THERAPY | Facility: CLINIC | Age: 58
End: 2018-10-11
Payer: COMMERCIAL

## 2018-10-11 ENCOUNTER — OFFICE VISIT (OUTPATIENT)
Dept: PHYSICAL THERAPY | Facility: CLINIC | Age: 58
End: 2018-10-11
Payer: COMMERCIAL

## 2018-10-11 DIAGNOSIS — I63.9 ISCHEMIC CEREBROVASCULAR ACCIDENT (CVA) (HCC): ICD-10-CM

## 2018-10-11 DIAGNOSIS — Z74.09 IMPAIRED FUNCTIONAL MOBILITY, BALANCE, AND ENDURANCE: ICD-10-CM

## 2018-10-11 DIAGNOSIS — R48.8 OTHER SYMBOLIC DYSFUNCTIONS: Primary | ICD-10-CM

## 2018-10-11 DIAGNOSIS — I63.9 CEREBROVASCULAR ACCIDENT (CVA), UNSPECIFIED MECHANISM (HCC): Primary | ICD-10-CM

## 2018-10-11 DIAGNOSIS — R41.89 COGNITIVE IMPAIRMENT: ICD-10-CM

## 2018-10-11 PROCEDURE — 97116 GAIT TRAINING THERAPY: CPT | Performed by: PHYSICAL THERAPIST

## 2018-10-11 PROCEDURE — 97110 THERAPEUTIC EXERCISES: CPT | Performed by: PHYSICAL THERAPIST

## 2018-10-11 PROCEDURE — 92507 TX SP LANG VOICE COMM INDIV: CPT

## 2018-10-11 PROCEDURE — 97112 NEUROMUSCULAR REEDUCATION: CPT | Performed by: PHYSICAL THERAPIST

## 2018-10-11 NOTE — PROGRESS NOTES
Daily Note     Today's date: 10/11/2018  Patient name: Milton Muniz  : 1960  MRN: [id number]  Referring provider: Viridiana Jimenes MD  Dx:   Encounter Diagnosis     ICD-10-CM    1  Cerebrovascular accident (CVA), unspecified mechanism ([facility] [address] ) I63 9    2  Impaired functional mobility, balance, and endurance Z74 09                   Subjective: Pt has no new complaints  Objective: See treatment diary below    Precautions: FALL risk, DM II, HTN, spinal stimulator (no stim), CDK, poor safety awareness, *Fear of Dogs*     Daily Treatment Diary      Manual                                                                                                                                                      Exercise Diary  9/17  9/25  10/4  10/9  10/11     Nustep L5, 10 min  L5, 10 min  10 min   L5, 10 min  L5, 10 min      STS               Static balance     FAEC 30" x 2  FTEC 30" x 1  FTEC, firm 30"x3  FTEO, foam 30" x 3  FTEO, foam 30" x 3     Ambulation Solo, fwd/bwd 3 laps      CL S, no  ft  CL S, no  ft     Semi tandem                Sidestepping Solo, 3 laps  solo, 3 laps           Alternating step taps 6'', 2x10  6'', 2x10 cone taps 30x    30x      Weaving in between [employer], 3 laps  solo, 3 laps    3 laps  2 laps      Stepping over hurdles Solo, fwd/lateral, 3 laps  Solo, fwd/lateral, 3 laps  solo 3 laps ea   2 laps  2 laps, CGA     Marching Solo, 3 laps  solo, 3 laps           Step ups 6'', Fwd, 2x10  solo, 6'', Fwd, 2x10 solo 2 x 15 15x  2 x 10       backwards walking   Solo, 3 laps solo 3 laps    3 laps     Ambulation with 360 deg         2 laps  2 laps                                                                                           Modalities                                                                                                          Assessment: Continued with exercises without solostep and required CL S for safety since pt can be impulsive at time   Demo improved activity tolerance today since pt was able to get to 8 min on nustep before taking a break and overall decreased rest breaks throughout session  Pt responded well to immediate performance feedback, and therapist frequently asked pt to self evaluate performance  Patient would benefit from continued PT      Plan: Progress treatment as tolerated

## 2018-10-11 NOTE — PROGRESS NOTES
Daily Speech Treatment Note    Today's date: 10/11/2018  Patients name: Snow Dunbar  : 1960  MRN: [id number]  Safety measures: CVA   Referring provider: Gerhard Damon MD    Primary Diagnosis/Billing code: B96 4  Secondary Diagnosis/ Billing code: I63 9, [id number]     Visit Tracking:  -Referring provider: EPIC   -Billing guidelines: AMA  -Visit #   -[facility]  [facility]  ([facility]-no auth;BOMN    [facility]-auth post 24 visits)   -RE due 11/15/2018    Subjective/Behavioral:    -"I don't know how I'm going to come here in the winter " Pt nervous about  Transportation during winter months  Objective/Assessment:  Pt does not bring back folder or HEP  Clinician reminded wife 3x now to bring in pictures; however have not seen pictures yet to help with pt's memory  Short-term goals:    1  Patient will be educated on oral motor exercises and provided an appropriate at home exercise program to be practiced to facilitate improved strength and function for increased speech intelligibility  To be achieved in 4-6 weeks  2  Patient will be educated on word finding strategies (i e , circumlocution) for improved generative naming and verbal expression skills  Pt named opposites in 23/50 opp independently, increasing to 43/50 with mod verbal semantic/phonemic and gestural cues  3  Patient will complete concrete and abstract categorization tasks to 80% accuracy to facilitate improved generative naming skills and working memory, to be achieved in 4-6 weeks  4  Patient will be educated on the use of internal and external memory aids and compensatory strategies with 80% accuracy to facilitate increased recall of routine, personal information, and recent events, to be achieved in 4-6 weeks      5  Patient will complete auditory immediate and short term memory tasks to 80% accuracy to facilitate increased ability to retell narratives and recall information within functional living environment, to be achieved in 4-6 weeks  6  Patient will provide an adequate response to generative naming task (i e , name as many) with 80% accuracy to facilitate increased expressive language skills, to be achieved in 4-6 weeks  Pt was provided with a category and asked to name as many items he could think of  Task completed over 13 trials  Named avg of 5 3 items independently  Increased when provided mod verbal cues (i e  "picture your kitchen"  7  Patient will provide an adequate response to confrontation naming task (i e , what is) with 80% accuracy to facilitate increased expressive language skills, to be achieved in 4-6 weeks  Pt was provided three clues and asked what object is being described  Task completed in 9/25 opp independently, increasing to 22/25 opp with mod verbal semantic and phonemic cues  8  Patient will name up to 5 features to describe a person/place/thing with 80% accuracy to facilitate improved utilization of circumlocution strategy, to be achieved in 4-6 weeks  9  Patient will complete picture description tasks using language organization strategies with 80% accuracy to facilitate improved utilization of circumlocution strategy, to be achieved in 4-6 weeks  10  Patient will complete reading comprehension tasks (e g , answering questions, following complex written directions, etc ) to 80% accuracy to facilitate carryover of comprehension of functional reading materials, to be achieved in 4-6 weeks  Plan:   -Patient was provided with home exercises/activities to target goals in plan of care at the end of today's session   -Continue with current plan of care

## 2018-10-12 ENCOUNTER — OFFICE VISIT (OUTPATIENT)
Dept: INTERNAL MEDICINE CLINIC | Facility: CLINIC | Age: 58
End: 2018-10-12
Payer: COMMERCIAL

## 2018-10-12 VITALS
HEART RATE: 72 BPM | OXYGEN SATURATION: 98 % | HEIGHT: 70 IN | BODY MASS INDEX: 34.47 KG/M2 | TEMPERATURE: 97 F | WEIGHT: 240.8 LBS | DIASTOLIC BLOOD PRESSURE: 74 MMHG | SYSTOLIC BLOOD PRESSURE: 130 MMHG | RESPIRATION RATE: 18 BRPM

## 2018-10-12 DIAGNOSIS — I12.9 BENIGN HYPERTENSION WITH CKD (CHRONIC KIDNEY DISEASE) STAGE III (HCC): ICD-10-CM

## 2018-10-12 DIAGNOSIS — Z79.4 TYPE 2 DIABETES MELLITUS WITH STAGE 3 CHRONIC KIDNEY DISEASE, WITH LONG-TERM CURRENT USE OF INSULIN (HCC): ICD-10-CM

## 2018-10-12 DIAGNOSIS — E11.22 TYPE 2 DIABETES MELLITUS WITH STAGE 3 CHRONIC KIDNEY DISEASE, WITH LONG-TERM CURRENT USE OF INSULIN (HCC): ICD-10-CM

## 2018-10-12 DIAGNOSIS — N18.30 BENIGN HYPERTENSION WITH CKD (CHRONIC KIDNEY DISEASE) STAGE III (HCC): ICD-10-CM

## 2018-10-12 DIAGNOSIS — F32.A ANXIETY AND DEPRESSION: ICD-10-CM

## 2018-10-12 DIAGNOSIS — Z79.4 TYPE 2 DIABETES MELLITUS WITH HYPOGLYCEMIA WITHOUT COMA, WITH LONG-TERM CURRENT USE OF INSULIN (HCC): Primary | ICD-10-CM

## 2018-10-12 DIAGNOSIS — F41.9 ANXIETY AND DEPRESSION: ICD-10-CM

## 2018-10-12 DIAGNOSIS — N18.30 TYPE 2 DIABETES MELLITUS WITH STAGE 3 CHRONIC KIDNEY DISEASE, WITH LONG-TERM CURRENT USE OF INSULIN (HCC): ICD-10-CM

## 2018-10-12 DIAGNOSIS — E11.649 TYPE 2 DIABETES MELLITUS WITH HYPOGLYCEMIA WITHOUT COMA, WITH LONG-TERM CURRENT USE OF INSULIN (HCC): Primary | ICD-10-CM

## 2018-10-12 PROCEDURE — 99213 OFFICE O/P EST LOW 20 MIN: CPT | Performed by: INTERNAL MEDICINE

## 2018-10-12 RX ORDER — INSULIN GLARGINE 100 [IU]/ML
35 INJECTION, SOLUTION SUBCUTANEOUS DAILY
Qty: 30 ML | Refills: 1 | Status: SHIPPED | OUTPATIENT
Start: 2018-10-12 | End: 2018-10-29 | Stop reason: HOSPADM

## 2018-10-12 RX ORDER — ESCITALOPRAM OXALATE 5 MG/1
5 TABLET ORAL DAILY
Qty: 90 TABLET | Refills: 0 | Status: SHIPPED | OUTPATIENT
Start: 2018-10-12 | End: 2018-10-26 | Stop reason: SDUPTHER

## 2018-10-12 RX ORDER — CHOLECALCIFEROL (VITAMIN D3) 1250 MCG
50000 CAPSULE ORAL WEEKLY
Refills: 5 | COMMUNITY
Start: 2018-09-24 | End: 2018-10-29 | Stop reason: HOSPADM

## 2018-10-12 NOTE — PATIENT INSTRUCTIONS
1  Stop NPH 70/30 after today  2  Start lantus tomorrow at 30 units once a day  3  novolog 4 units with each meal  4  Monitor blood sugars 4-5 times per day  5  Start lexapro once a day  6  Return in 2 weeks  7   Diabetes educator

## 2018-10-12 NOTE — PROGRESS NOTES
Assessment/Plan:     Diagnoses and all orders for this visit:    Type 2 diabetes mellitus with hypoglycemia without coma, with long-term current use of insulin ([facility] [address] )  Comments:  stop NPH 70/30 and start lantus 1x per day with humalog for meals  check BS and call me with readings in 1 week  dose conversions made as below  Orders:  -     insulin glargine (LANTUS) 100 units/mL subcutaneous injection; Inject 35 Units under the skin daily  -     insulin aspart (NovoLOG) 100 units/mL injection; Inject 4 Units under the skin 3 (three) times a day before meals  -     Ambulatory referral to Diabetic Education; Future    Benign hypertension with CKD (chronic kidney disease) stage III ([facility])  Comments:  stage 3/4 CKD, BP controlled c/w CCB    Type 2 diabetes mellitus with stage 3 chronic kidney disease, with long-term current use of insulin ([facility])  Comments:  see plan as above for insulin dose conversion, advised to contact on-call provider if any questions over weekend and f/u with me next week  Orders:  -     glucose blood (ACCU-CHEK ACTIVE STRIPS) test strip; 1 each by Other route 3 (three) times a day Use as instructed    Anxiety and depression  Comments:  d/w neprhology and sertraline or lexapro are acceptable use in CKD stage 4(prefer sertraline)  pt/wife decline sertraline ->start lexapro  Orders:  -     escitalopram (LEXAPRO) 5 mg tablet; Take 1 tablet (5 mg total) by mouth daily    Other orders  -     Cholecalciferol (VITAMIN D3) 31331 units CAPS; Take 50,000 Units by mouth once a week      Patient takes ~45 5 units of NPH/day x0 8 = 36 4 U lantus(started at 30 U due to pt'sintermittent hypoglycemia recently which is mostly mid day or later in afternoon)  Pt takes 19 5 U of regular insulin per day -> advised to start novolog 4 U with each meal and t/c increase to 5-6 U with each meal after seeing how pt responds to novolog    Subjective:      Patient ID: Natalee Escobedo is a 62 y o  male      HPI     Here for ER follow up and here with wife to discuss fluctuating BS readings  Having depression and willing to start anti-depressant, declines sertraline & would rather take lexapro  Using NPH 70/30 insulin twice a day and willing to change to lantus with novolog with meals  BS in 190s to low 200s  Declines flu vaccine  No other complaints    Past Medical History:   Diagnosis Date    Diabetes mellitus ([facility] [address] )     Hypercholesteremia     Hyperlipidemia     Hypertension     Neuropathy     Obesity     Osteomyelitis ([facility] [address] )     last assessed 11/4/16    PVC's (premature ventricular contractions)     sees cardiology Dr Sonia camargo    Stroke [facility])     last weeof July 2018 [address] [facility]     Vitals:    10/12/18 1507   BP: 130/74   Pulse: 72   Resp: 18   Temp: (!) 97 °F (36 1 °C)   TempSrc: Oral   SpO2: 98%   Weight: 109 kg (240 lb 12 8 oz)   Height: 5' 10" (1 778 m)     Body mass index is 34 55 kg/m²  Current Outpatient Prescriptions:     amLODIPine (NORVASC) 5 mg tablet, Take 1 tablet (5 mg total) by mouth every 12 (twelve) hours, Disp: 60 tablet, Rfl: 3    aspirin (ECOTRIN LOW STRENGTH) 81 mg EC tablet, Take 81 mg by mouth daily Resume on 8/14, Disp: , Rfl:     atorvastatin (LIPITOR) 80 mg tablet, Take 1 tablet (80 mg total) by mouth daily with dinner, Disp: 30 tablet, Rfl: 3    B-D INS SYRINGE 0 5CC/30GX1/2" 30G X 1/2" 0 5 ML MISC, , Disp: , Rfl:     Blood Glucose Monitoring Suppl (ONE TOUCH ULTRA 2) w/Device KIT, by Does not apply route 3 (three) times a day, Disp: 1 each, Rfl: 0    Blood Pressure Monitoring (BLOOD PRESSURE CUFF) MISC, Use to check blood pressure before taking blood pressure medication and 1 hour after and follow instructions provided in discharge instructions based on the readings  , Disp: 1 each, Rfl: 0    carvedilol (COREG) 6 25 mg tablet, Take 1 tablet (6 25 mg total) by mouth 2 (two) times a day with meals, Disp: 60 tablet, Rfl: 2    Cholecalciferol (VITAMIN D3) 14267 units CAPS, Take 50,000 Units by mouth once a week, Disp: , Rfl: 5    cholecalciferol 97721 units TABS, Take 1 tablet (50,000 Units total) by mouth once a week, Disp: 5 tablet, Rfl: 5    cyanocobalamin 1000 MCG tablet, Take 1 tablet (1,000 mcg total) by mouth daily, Disp: 30 tablet, Rfl: 5    diphenoxylate-atropine (LOMOTIL) 2 5-0 025 mg per tablet, Take 1 tablet by mouth 4 (four) times a day as needed for diarrhea, Disp: , Rfl:     famotidine (PEPCID) 20 mg tablet, Take 20 mg by mouth daily Resume on 8/14, Disp: , Rfl:     gabapentin (NEURONTIN) 250 mg/5 mL solution, Take 12 mL (600 mg total) by mouth daily at bedtime, Disp: 470 mL, Rfl: 2    glucose 4 g chewable tablet, Chew 3 tablets (12 g total) as needed for low blood sugar, Disp: 50 tablet, Rfl: 0    glucose blood (ACCU-CHEK ACTIVE STRIPS) test strip, 1 each by Other route 3 (three) times a day Use as instructed, Disp: 300 each, Rfl: 1    Incontinence Supplies (MALE URINAL) MISC, by Does not apply route daily, Disp: 6 each, Rfl: 3    lactulose 20 g/30 mL, Take 30 mL (20 g total) by mouth daily as needed (constipation), Disp: 473 mL, Rfl: 0    neomycin-bacitracin-polymyxin b (NEOSPORIN) ointment, Apply 1 application topically 2 (two) times a day Apply twice per day to shin wound until fully healed  If not fully healed in 5 days contact your family doctor   Next application is the evening of 8/13, Disp: , Rfl:     ondansetron (ZOFRAN-ODT) 4 mg disintegrating tablet, Take 1 tablet (4 mg total) by mouth every 6 (six) hours as needed for nausea or vomiting, Disp: 20 tablet, Rfl: 0    escitalopram (LEXAPRO) 5 mg tablet, Take 1 tablet (5 mg total) by mouth daily, Disp: 90 tablet, Rfl: 0    insulin aspart (NovoLOG) 100 units/mL injection, Inject 4 Units under the skin 3 (three) times a day before meals, Disp: 11 mL, Rfl: 1    insulin glargine (LANTUS) 100 units/mL subcutaneous injection, Inject 35 Units under the skin daily, Disp: 30 mL, Rfl: 1  No Known Allergies      Review of Systems   Constitutional: Negative for fever  HENT: Negative for congestion  Eyes: Negative for visual disturbance  Respiratory: Negative for shortness of breath  Cardiovascular: Negative for chest pain  Gastrointestinal: Negative for abdominal pain  Genitourinary: Negative for dysuria  Musculoskeletal: Negative for arthralgias  Skin: Negative for rash  Neurological: Positive for weakness (s/p CVA)  Negative for headaches  Psychiatric/Behavioral: Positive for dysphoric mood  Objective:      /74   Pulse 72   Temp (!) 97 °F (36 1 °C) (Oral)   Resp 18   Ht 5' 10" (1 778 m)   Wt 109 kg (240 lb 12 8 oz)   SpO2 98%   BMI 34 55 kg/m²          Physical Exam   Constitutional: He appears well-developed and well-nourished  HENT:   Head: Normocephalic and atraumatic  Mouth/Throat: Oropharynx is clear and moist    Eyes: Pupils are equal, round, and reactive to light  Conjunctivae are normal    Cardiovascular: Normal rate, regular rhythm and normal heart sounds  No murmur heard  Pulmonary/Chest: Effort normal and breath sounds normal  He has no wheezes  He has no rales  Abdominal: Soft  Bowel sounds are normal  There is no tenderness  Musculoskeletal: He exhibits no edema  Neurological: He is alert  Psychiatric: He has a normal mood and affect  His behavior is normal    Vitals reviewed

## 2018-10-15 ENCOUNTER — EVALUATION (OUTPATIENT)
Dept: OCCUPATIONAL THERAPY | Facility: CLINIC | Age: 58
End: 2018-10-15
Payer: COMMERCIAL

## 2018-10-15 DIAGNOSIS — I69.30 HISTORY OF ISCHEMIC CEREBROVASCULAR ACCIDENT (CVA) WITH RESIDUAL DEFICIT: Primary | ICD-10-CM

## 2018-10-15 DIAGNOSIS — E11.65 TYPE 2 DIABETES MELLITUS WITH HYPERGLYCEMIA, UNSPECIFIED WHETHER LONG TERM INSULIN USE (HCC): Chronic | ICD-10-CM

## 2018-10-15 DIAGNOSIS — N18.30 BENIGN HYPERTENSION WITH CKD (CHRONIC KIDNEY DISEASE) STAGE III (HCC): ICD-10-CM

## 2018-10-15 DIAGNOSIS — I12.9 BENIGN HYPERTENSION WITH CKD (CHRONIC KIDNEY DISEASE) STAGE III (HCC): ICD-10-CM

## 2018-10-15 DIAGNOSIS — I69.30 HISTORY OF ISCHEMIC CEREBROVASCULAR ACCIDENT (CVA) WITH RESIDUAL DEFICIT: ICD-10-CM

## 2018-10-15 DIAGNOSIS — I10 ESSENTIAL HYPERTENSION: Chronic | ICD-10-CM

## 2018-10-15 PROCEDURE — 97530 THERAPEUTIC ACTIVITIES: CPT

## 2018-10-15 PROCEDURE — G8991 OTHER PT/OT GOAL STATUS: HCPCS

## 2018-10-15 PROCEDURE — G8990 OTHER PT/OT CURRENT STATUS: HCPCS

## 2018-10-15 RX ORDER — BLOOD-GLUCOSE METER
EACH MISCELLANEOUS 3 TIMES DAILY
Qty: 1 EACH | Refills: 0 | Status: CANCELLED | OUTPATIENT
Start: 2018-10-15

## 2018-10-15 RX ORDER — CARVEDILOL 6.25 MG/1
6.25 TABLET ORAL 2 TIMES DAILY WITH MEALS
Qty: 180 TABLET | Refills: 1 | Status: SHIPPED | OUTPATIENT
Start: 2018-10-15 | End: 2019-07-10 | Stop reason: SDUPTHER

## 2018-10-15 RX ORDER — AMLODIPINE BESYLATE 5 MG/1
5 TABLET ORAL EVERY 12 HOURS
Qty: 180 TABLET | Refills: 1 | Status: SHIPPED | OUTPATIENT
Start: 2018-10-15 | End: 2019-06-10 | Stop reason: SDUPTHER

## 2018-10-15 NOTE — TELEPHONE ENCOUNTER
Pt does still have some left, was last in to see you last week Pt's wife is requesting 3 month suply

## 2018-10-15 NOTE — PROGRESS NOTES
Occupational Therapy Stroke Evaluation:    Today's Date: 10/15/2018  Patient Name: Nubia Landers  : 1960  MRN: [id number]  Referring Provider: Mauricio Benton MD  Dx: History of ischemic cerebrovascular accident (CVA) with residual deficit [I69 30]    Active Problem List:   Patient Active Problem List   Diagnosis    Type 2 diabetes mellitus with hypoglycemia, with long-term current use of insulin (HCC)    Mixed hyperlipidemia    Benign hypertension with CKD (chronic kidney disease) stage III (HCC)    Embolic stroke of left basal ganglia (HCC)    Cognitive impairment    Elevated alkaline phosphatase level    CKD (chronic kidney disease)    Diabetic macular edema (HCC)    GERD (gastroesophageal reflux disease)    [facility]rhosis ([facility] )    Diabetic polyneuropathy associated with type 2 diabetes mellitus ([facility] )    Other constipation    Abnormal EEG    [facility]     Past Medical Hx:   Past Medical History:   Diagnosis Date    Diabetes mellitus ([facility] )     Hypercholesteremia     Hyperlipidemia     Hypertension     Neuropathy     Obesity     Osteomyelitis ([facility] )     last assessed 16    PVC's (premature ventricular contractions)     sees cardiology Dr Rashida camargo    Stroke [facility])     last weeof 2018 [employer]     Past Surgical Hx:   Past Surgical History:   Procedure Laterality Date    ABDOMINAL SURGERY      CHOLECYSTECTOMY      Percutaneous    OTHER SURGICAL HISTORY      "stimulator to control bowel movements"    OK ESOPHAGOGASTRODUODENOSCOPY TRANSORAL DIAGNOSTIC N/A 2016    Procedure: ESOPHAGOGASTRODUODENOSCOPY (EGD); Surgeon: Nataly Terrell MD;  [facility];   Service: Gastroenterology    OK LAP,CHOLECYSTECTOMY N/A 2016    Procedure: LAPAROSCOPIC CHOLECYSTECTOMY ;  Surgeon: Giselle Cornejo DO;  Location[facility];  Service: General    ROTATOR CUFF REPAIR Right     TOE AMPUTATION Right 10/28/2016    Procedure: 3RD TOE AMPUTATION ;  Surgeon: Driss Santos DPM;  [facility];  Service:         Pain Levels:   Restin    With Activity:  8    Subjective/Patient Goal: "I feel like my vision has gotten better "    History of Present Illness:  Pt is a pleasant, active, employed full time 62 y o  male seen for OT eval s/p referred to [address] [place] [address] s/p adm to [facility] 2018-2018 s/p R facial droop, stroke alert called, administered TPA in ED, managed in ICU, no ICH noted, MRIB + Recent ischemia, without hemorrhage in the left posterior perforating substance affecting hypothalamus, anterolateral thalamus, and genu of the left internal capsule, f/u CTB: Expected evolution of the known ischemic infarct in the left basal ganglion since prior CT   No hemorrhagic conversion   No new ischemic infarcts are seen   No new intraparenchymal hemorrhages are seen   No hydrocephalus, adm to [facility] 2018-2018, now referred for outpt OT/PT, dx'd w/ L BG CVA, CKD, diabetic macular edema, elevated alkaline phosphatase level, cognitive impairment, neuropathy, HTN, HLD, comorbidities as listed above  Pt seen for OT re-evaluation/progress note/status update 2* admitted to [address] [place] [address] - diagnosed w/ HTN, HLD, cognitive impairment and CKD III       Lifestyle Performance Model:  Autonomy: Pt was I w/  I/ADLS, drove, & required no use of DME PTA, since CVA was min A for ADLS, A for IADLS, has not returned to driving and required use of RW,   Reciprocal Relationships: Supportive wife and 4 children living 16, 22, 32, and 30  Service to Others: Pt is retired , also worked for [employer]  Intrinsic Gratification: Enjoys nothing outside of working going out to eat, going shopping, Spiritism on     Home Setup: Pt lives in a 1 story handicapped accessible apartment w/ first floor setup w/ 0 RAFAT in OS off [facility]     Objective  Impairments Section:   UE Strength:   MARIFER: RUE: 35/200 LUE: 50/200   PINCER: 3 point pinch: RUE 9, LUE: 5   2 point pinch: RUE: 9, LUE: 6   Lateral pincher: RUE: 14, LUE: 8    Coordination:   9 HOLE PEG TEST:     RUE: 43 seconds, LUE: 34 Seconds    Sensation:  MYOFILAMENTS:   RUE: 4 31   LUE: 4 31    Visual Perceptual and Functional Cognition:  1  Convergence Insufficiency Symptom Survey (CISS): 15/60 PASS    2  Cognitive Checklist:  *Patient indicated that he is experiencing the following symptoms:    · Memory: Learning new things and Driving directions    · Attention: Keeping attention during a conversation, Sustaining attention on a task and Dividing your attention (i e , multi-tasking)    · Processing: Responding to questions in a timely manner    · Executive Functions: None    · Communication: Understanding others' non-verbal cues    · Visual: Misspelling while texting/email    · Emotional: Monitoring mood and Sleep changes    · Increased Sensitivities to: None       3  [place] Cognitive Assessment Version 8 1 (MoCA V8 1)  Visuospatial/executive functionin/5  Namin/3  Memory: 1st trial:  , 2nd trial:    Attention/concentration: 2/2  List of letters:   Serial Seven Subtraction:  3/3 w/ 1 errors  Language/sentence repetition:  1/2  Language Fluency:  0/1  Abstract/Correlational Thinkin/2  Delayed Recall:  05  Orientation:  56   Memory Index Score: 3/15  MoCA V1 8 1 Raw Score:  15/30, MIS:  3/15, indicative of moderate neurocognitive impairments      4  Vision Screening Recording Form:   vision screen: with prescription glasses  near acuity: R 20/25 w/ 2 errors, L 20/30 w/ 1 error  binocularity far: bilateral low exophoria  binocularity near: bilateral orthophoria  red green fusion: PLRG @ 12"  near point of convergence: no diploplia/fusion, decreased bilateral teaming, persistent diploplia  enrico string: mislalignment  Pursuits: slightly jerky  Saccades: inaccurate in vertical, horizontal, and diagonal planes  ocular ROM: full  visual perceptual midline shift: @ midline, above horizon      5  Anxiety/Depression:  Pt engaged in the Neuro QOL Anxiety Short Form and Neuro QOL Depression Short Form in order to screen for anxiety and depressive s/s  Pt reported the following:  Anxiety- Short Form:   --used to measure anxious s/s  For scoring purposes, scores of 25+ indicate the threshold for treatment per neurology/SLIM  Score:14/40  Pt most often chose the response "rarely" when asked about feeling anxious s/s  Depression- Short Form:   --used to measure depressive s/s  For scoring purposes, scores of 25+ indicate the threshold for treatment per neurology/SLIM  Score:18/40  Pt most often chose the response "rarely" when asked about feeling depressive s/s  Assessment/Plan  Occupational Therapy Skilled Analysis Assessment and Plan of Care:  Pt requires overall mod I for ADLs/self care and mod I for fx'l mobility w/ RW  Pt is currently demonstrating the following occupational deficits: limited 2* diplopia w/ binocular vision, R esophoria, L eye droop, L eye exophoric strabismus at rest, PLRG @ 12", no diplopia/fusion for point of convergence, misalignment, jerky pursuits, inaccurate saccades, confused, disoriented, impulsive, distractible, poor problem solving and sequencing, poor safety insight judgment and awareness into deficits, poor attention/concentration to task, requiring max cues to redirect and additional time for delayed processing, decreased STM/immediate delayed recall, decreased working memory, memory retention, dysarthria, facial droop, decreased EF skills, follows simple one step verbal concrete commands w/ max cues for carryover, difficulty w/ pacing/planning, R handed, macrographia, RUE hemiparesis w/ pronator drift w/ hypotonicity proximal>distal, apraxia, ataxia, dysmetria, dysdiadochokinesia, impaired FMC/FMS/GMC/GMS, impaired tactile/proprioceptive kinesthetic sensory input   The following Occupational Performance Areas to address include: grooming, bathing/shower, toilet hygiene, dressing, medication management, socialization, health maintenance, functional mobility, community mobility, clothing management, cleaning, meal prep, money management, household maintenance, care of children, care of pets, job performance/volunteering and social participation  Based on the aforementioned OT evaluation, functional performance deficits, and assessments, pt has been identified as a high complexity evaluation  Pt to continue to benefit from outpatient skilled OT services to address the following goals 2x/wk to  w/in 4 weeks with special focus on self-care management, pt education, RUE coordination/strengthening as well as motor training to improve above deficits and enhance overall QOL/function  Defer language to SLP, vision to vision therapy/neuro optometrist, OT to focus on gross cog/memory, attention/concentration, and RUE function  Pt seen for OT re-eval/progress note/status update today 10/15/18  Pt still demonstrated the following occupational deficits: limited 2* diplopia w/ binocular vision, strabismus at rest, PLRG @ 12", no diplopia/fusion for point of convergence, misalignment, jerky pursuits, inaccurate saccades, confused, disoriented, impulsive, distractible, poor problem solving and sequencing, poor safety insight judgment and awareness into deficits, poor attention/concentration to task, requiring max cues to redirect and additional time for delayed processing, decreased STM/immediate delayed recall, decreased working memory, memory retention, dysarthria, facial droop, decreased EF skills, follows simple one step verbal concrete commands w/ max cues for carryover, difficulty w/ pacing/planning, R handed, macrographia, RUE hemiparesis w/ pronator drift w/ hypotonicity proximal>distal, apraxia, ataxia, dysmetria, dysdiadochokinesia, impaired FMC/FMS/GMC/GMS, impaired tactile/proprioceptive kinesthetic sensory input   Continued OT services are recommended to work on UE strengthening/coordination, FMS/FMC/GMS/GMC, insight, judgement, safety awareness, and bilateral UE integrative tasks  Defer vision to neuro optometry and vision therapy      Goals:  Short Term Goals:  Modalities:  · Pt will tolerate BIONESS for improved motor and sensory performance for overall improved hand to target with 80% accuracy 4 weeks - PARTIALLY MET  Coordination:  · Pt will increase prehension patterns for improved tripod with utensil management with <20% droppage 4 weeks - PARTIALLY MET  · Pt will increase proprioception of  R hand to target for improved functional reach vision occluded with ADL tasks 4 weeks - PARTIALLY MET  · Pt will increase automaticity of RUE  to 75% for improved grasp release of tabletop items for improved functional performance with salient and fx'l I/ADL/leisure tasks 4 weeks - PARTIALLY MET  ROM/Function:  · Pt will increase R  UE to Gross Assist, refined functional assist, and stabilization with <20% cuing for tabletop tasks 4 weeks for improved functional performance of life roles and salient tasks 4 weeks - PARTIALLY MET  · Pt will demo with G carryover of Home Exercise Program to improve functional progression towards goals in Plan of care and for improved functional use of  RUE 4 weeks - MET  Cognition:  · Pt will increase attention to 2+ tasks for improved role performance (once returned) and engagement in salient tasks 4 weeks as applicable - PARTIALLY MET  · Pt will increase insight into deficits for improved carryover of recommendations, accommodations, improved rate of healing 4 weeks - NOT MET  Long Term Goals:  Coordination:  · Decrease ataxia with functional reach for normalized movement pattern of  RUE  - PARTIALLY MET  · Increase automaticity of  RUE to 100% for resumption of B integrative tasks - PARTIALLY MET  ROM/Function:  · Increase func mobs from various surfaces to Mod I/ I with least restrictive device and good safety t/o I/ADL/leisure tasks - PARTIALLY MET  · Resume hand dominance to refined mod I functional assist with all I/ADL/leisure/salient tasks (including driving if applicable) - PARTIALLY MET  · Pt will increase B/L UE strength to 5/5 and  strength, through the use of strengthening exercises and home program for eventual return to driving PRN - PARTIALLY MET  · Pt will increase FMC to Mod I with ADL fasteners for increased independence - PARTIALLY MET  Cognition:  · Pt will increase attention to 3+ tasks for improved divided attention with occupational roles and pre driving roles as applicable - PARTIALLY MET  · Pt will demo with decreased anxiety and frustration for improved insight into stroke process and rate of recovery - PARTIALLY MET    INTERVENTION COMMENTS:  Diagnosis: L BG CVA, CKD, diabetic macular edema, elevated alkaline phosphatase level, cognitive impairment, neuropathy, HTN, HLD  Precautions: HTN  FOTO: 75 with 25% limitation  Insurance: [address] [place] [address] [[id number]]  10 of 24 visits, PN due 11/15/18    Patient treated by TREVOR King, under direct supervision of SARAY Parker OTR/L  Thank you for the consult!   Please call if you have any questions: [phone]  SARAY Parker, OTD, OTR/L, C-GCJULISA, CSRS  Director of Outpatient Neuro Occupational Therapy

## 2018-10-16 ENCOUNTER — APPOINTMENT (OUTPATIENT)
Dept: PHYSICAL THERAPY | Facility: CLINIC | Age: 58
End: 2018-10-16
Payer: COMMERCIAL

## 2018-10-17 ENCOUNTER — OFFICE VISIT (OUTPATIENT)
Dept: OCCUPATIONAL THERAPY | Facility: CLINIC | Age: 58
End: 2018-10-17
Payer: COMMERCIAL

## 2018-10-17 ENCOUNTER — OFFICE VISIT (OUTPATIENT)
Dept: PHYSICAL THERAPY | Facility: CLINIC | Age: 58
End: 2018-10-17
Payer: COMMERCIAL

## 2018-10-17 ENCOUNTER — OFFICE VISIT (OUTPATIENT)
Dept: SPEECH THERAPY | Facility: CLINIC | Age: 58
End: 2018-10-17
Payer: COMMERCIAL

## 2018-10-17 DIAGNOSIS — I63.9 CEREBROVASCULAR ACCIDENT (CVA), UNSPECIFIED MECHANISM (HCC): Primary | ICD-10-CM

## 2018-10-17 DIAGNOSIS — Z74.09 IMPAIRED FUNCTIONAL MOBILITY, BALANCE, AND ENDURANCE: ICD-10-CM

## 2018-10-17 DIAGNOSIS — I69.30 HISTORY OF ISCHEMIC CEREBROVASCULAR ACCIDENT (CVA) WITH RESIDUAL DEFICIT: Primary | ICD-10-CM

## 2018-10-17 DIAGNOSIS — I63.9 ISCHEMIC CEREBROVASCULAR ACCIDENT (CVA) (HCC): ICD-10-CM

## 2018-10-17 DIAGNOSIS — R48.8 OTHER SYMBOLIC DYSFUNCTIONS: Primary | ICD-10-CM

## 2018-10-17 DIAGNOSIS — R41.89 COGNITIVE IMPAIRMENT: ICD-10-CM

## 2018-10-17 PROCEDURE — 97110 THERAPEUTIC EXERCISES: CPT

## 2018-10-17 PROCEDURE — 97112 NEUROMUSCULAR REEDUCATION: CPT

## 2018-10-17 PROCEDURE — 97112 NEUROMUSCULAR REEDUCATION: CPT | Performed by: PHYSICAL THERAPIST

## 2018-10-17 PROCEDURE — 97140 MANUAL THERAPY 1/> REGIONS: CPT

## 2018-10-17 PROCEDURE — 97110 THERAPEUTIC EXERCISES: CPT | Performed by: PHYSICAL THERAPIST

## 2018-10-17 PROCEDURE — 92507 TX SP LANG VOICE COMM INDIV: CPT

## 2018-10-17 NOTE — PROGRESS NOTES
Daily Note     Today's date: 10/17/2018  Patient name: Silvana Reyes  : 1960  MRN: [id number]  Referring provider: Fdiel Gar MD  Dx:   Encounter Diagnosis     ICD-10-CM    1  Cerebrovascular accident (CVA), unspecified mechanism ([facility] ) I63 9    2  Impaired functional mobility, balance, and endurance Z74 09                   Subjective: Pt has no new complaints         Objective: See treatment diary below    Precautions: FALL risk, DM II, HTN, spinal stimulator (no stim), CDK, poor safety awareness, *Fear of Dogs*     Daily Treatment Diary      Manual                                                                                                                                                      Exercise Diary  9/17  9/25  10/4  10/9  10/11  10/17   Nustep L5, 10 min  L5, 10 min  10 min   L5, 10 min  L5, 10 min   10 min L6   STS            10x2   Static balance     FAEC 30" x 2  FTEC 30" x 1  FTEC, firm 30"x3  FTEO, foam 30" x 3  FTEO, foam 30" x 3     Ambulation Solo, fwd/bwd 3 laps      CL S, no  ft  CL S, no  ft     Semi tandem                Sidestepping Solo, 3 laps  solo, 3 laps        3 laps no UE   Alternating step taps 6'', 2x10  6'', 2x10 cone taps 30x    30x      Weaving in between [employer], 3 laps  solo, 3 laps    3 laps  2 laps      Stepping over hurdles Solo, fwd/lateral, 3 laps  Solo, fwd/lateral, 3 laps  solo 3 laps ea   2 laps  2 laps, CGA     Marching Solo, 3 laps  solo, 3 laps        3 laps no UE   Step ups 6'', Fwd, 2x10  solo, 6'', Fwd, 2x10 solo 2 x 15 15x  2 x 10   fwd/lat 2x10 ea     backwards walking   Solo, 3 laps solo 3 laps    3 laps     Ambulation with 360 deg         2 laps  2 laps      hurdles           2 laps fwd/lat ea no ue                                                                          Modalities                                                                                                          Assessment:       Plan: Progress treatment as tolerated

## 2018-10-17 NOTE — PROGRESS NOTES
Daily Note     Today's date: 10/17/2018  Patient name: Renae Schrader  : 1960  MRN: [id number]  Referring provider: Rachael Jones MD  Dx:   Encounter Diagnosis   Name Primary?  History of ischemic cerebrovascular accident (CVA) with residual deficit Yes       Start Time: 1700  Stop Time: 1800  Total time in clinic (min): 60 minutes    Subjective: "I had it rough "      Objective: See treatment diary below  Pt participated in skilled OT focusing on RUE strengthening and endurance utilizing arm bike for 5 min, IASTM and KTape for retraction, depression and tone reduction of L trap  Pt performed exercises supine on mat utilizing 2# therbar for shoulder flex/ext, abd/add, protract/retract for 3 sets 10 reps for symmetrical movement for BUE  Frequent cues for proper exercise techniques  In quadraped position on mat, pt focused on WB into B/L shoulders while crossing midline for BB retrieval with toss, alternating UEs  Assessment: Tolerated treatment well  Pt attempted seated therex using 2# tbar, decreased symmetry noted with RUE lag  Pt reported pain in RUE, followed by IASTM to R delt and bicep and trap  Min improvement noted  Continued with therex supine  Improvement noted with symmetry  Pt engaged in WB in quadraped with mod cues to alternate UEs during task  No ataxic movement noted during task  Patient would benefit from continued OT      Plan: Continued skilled OT per POC          INTERVENTION COMMENTS:  Diagnosis: L BG CVA, CKD, diabetic macular edema, elevated alkaline phosphatase level, cognitive impairment, neuropathy, HTN, HLD  Precautions: HTN  FOTO: 75 with 25% limitation  Insurance: [address] [place] [address] [[id number]]  11 of 24 visits, PN due 11/15/18

## 2018-10-17 NOTE — PROGRESS NOTES
Daily Speech Treatment Note    Today's date: 10/17/2018  Patients name: Joce Dixon  : 1960  MRN: [id number]  Safety measures: CVA   Referring provider: Willard Goodell, MD    Primary Diagnosis/Billing code: U35 8  Secondary Diagnosis/ Billing code: I63 9, I69 322     Visit Tracking:  -Referring provider: EPIC   -Billing guidelines: AMA  -Visit #15/24   -[facility]  [facility]  ([place]-no auth;BOMN    [facility]-auth post 24 visits)   -RE due 11/15/2018    Subjective/Behavioral:    -"Oh that's my sister "     Objective/Assessment:  Wife brought in pictures of family today to target memory  Wrote names of family members on back of pictures  Pt was provided with (another) folder today for HEP with hot pink sticky note saying "Therapy Folder" to improve pt's awareness to folder necessity  Wife was present for entire session  Short-term goals:    1  Patient will be educated on oral motor exercises and provided an appropriate at home exercise program to be practiced to facilitate improved strength and function for increased speech intelligibility  To be achieved in 4-6 weeks  2  Patient will be educated on word finding strategies (i e , circumlocution) for improved generative naming and verbal expression skills  3  Patient will complete concrete and abstract categorization tasks to 80% accuracy to facilitate improved generative naming skills and working memory, to be achieved in 4-6 weeks  4  Patient will be educated on the use of internal and external memory aids and compensatory strategies with 80% accuracy to facilitate increased recall of routine, personal information, and recent events, to be achieved in 4-6 weeks  With pictures of family members, pt able to name family member on picture independently in 7/10 opp  Occasionally mixing up his son's names  Will continue to target       A memory book was completed today by pt and his wife to improve his overall memory of events, routine, and personal information  Pt completed with mod assistance (semantically) from his wife  Incorrect address by 1 #; able to correct  Wife reports he has a new cell # and home phone #  Used "repeat rehearse" strategy along with "chunking" strategy to improve his STM of both numbers  Pt getting last 4-digits of each number mixed up  Will continue to target  5  Patient will complete auditory immediate and short term memory tasks to 80% accuracy to facilitate increased ability to retell narratives and recall information within functional living environment, to be achieved in 4-6 weeks  6  Patient will provide an adequate response to generative naming task (i e , name as many) with 80% accuracy to facilitate increased expressive language skills, to be achieved in 4-6 weeks  Pt completed simple crossword puzzle; answered clues in 7/10 opp independently, increasing to 10/10 opp with min verbal semantic cues  7  Patient will provide an adequate response to confrontation naming task (i e , what is) with 80% accuracy to facilitate increased expressive language skills, to be achieved in 4-6 weeks  8  Patient will name up to 5 features to describe a person/place/thing with 80% accuracy to facilitate improved utilization of circumlocution strategy, to be achieved in 4-6 weeks  9  Patient will complete picture description tasks using language organization strategies with 80% accuracy to facilitate improved utilization of circumlocution strategy, to be achieved in 4-6 weeks  10  Patient will complete reading comprehension tasks (e g , answering questions, following complex written directions, etc ) to 80% accuracy to facilitate carryover of comprehension of functional reading materials, to be achieved in 4-6 weeks  Plan:   -Patient was provided with home exercises/activities to target goals in plan of care at the end of today's session   -Continue with current plan of care

## 2018-10-18 ENCOUNTER — APPOINTMENT (OUTPATIENT)
Dept: OCCUPATIONAL THERAPY | Facility: CLINIC | Age: 58
End: 2018-10-18
Payer: COMMERCIAL

## 2018-10-18 ENCOUNTER — APPOINTMENT (OUTPATIENT)
Dept: PHYSICAL THERAPY | Facility: CLINIC | Age: 58
End: 2018-10-18
Payer: COMMERCIAL

## 2018-10-18 ENCOUNTER — TELEPHONE (OUTPATIENT)
Dept: NEUROLOGY | Facility: CLINIC | Age: 58
End: 2018-10-18

## 2018-10-18 ENCOUNTER — APPOINTMENT (OUTPATIENT)
Dept: SPEECH THERAPY | Facility: CLINIC | Age: 58
End: 2018-10-18
Payer: COMMERCIAL

## 2018-10-18 NOTE — TELEPHONE ENCOUNTER
Called wife  She states patient's memory is improving but is about 25% of what it used to be  He is oriented but forgetful  Wife checks patient's blood sugar 3 to 4 times daily  Last reading today was 116  Wife reviewed all patient's medications with me  Patient has not missed any doses and has not experienced any side effects  Patient still compliant with lipitor 80 mg as well as aspirin 81 mg for stroke management/prevention  Patient has not experienced any falls  Patient uses a walker to ambulate  Patient performs ADLs independently  Appetite is "too good"  Wife helps manage appropriate diet  Wife has no questions or concerns at this time

## 2018-10-18 NOTE — TELEPHONE ENCOUNTER
Bettie Mcgraw 133 Discharge Follow-Up Phone Call Juan Gtz 1721: [facility]  Hospitalization: 08/02 - 08/13    Patient lives at home with wife  Since discharge from [facility], patient is now home  He had 2 ER visits without hospitalization: 09/03 [facility] for hypoglycemia, 10/06 [facility] for weakness and hyperglycemia  He had 1 rehospitalization: 09/26 - 09/27 for hypoglycemia  No C referral  Goes to outpatient PT, OT, SPEECH twice a week  PCP visits 08/15, 09/21, 10/12  Follows with [facility] for stroke  Last appointment 09/24  Next appointment with Dr Dannielle Klein 12/26    Patient has no new or worsening stroke symptoms  Patient unaware of medications as he does not manage them  Wife Eliana administers medications, she is not home  Will call back  Patient does not smoke  Monitors blood pressure daily  No BP over 140/90  Patient states he monitors blood glucose but is not aware of last sugar was  Patient unsure of HA1c  Looks like patient is to report levels to PCP  Will clarify with wife when she is home  Patient also unaware of last LDL  States he follows a low sodium, low fat, diabetic diet  Denies any difficulty with swallowing, choking, or aspirating  Appetite fair  He gets 1 to 3 hours a week of exercise performed outside of PT/OT  Patient did not receive written education materials during your hospitalization before [facility]  Verbalizes understanding of  personal risk factors  Patient knows some stroke symptoms such as headache and speech difficulty  I reeducated him on "BE FAST" and symptoms associated with it  Patient has no questions or concerns  Will call back to speak with wife  Patient states she will be home in 15 min

## 2018-10-19 ENCOUNTER — TELEPHONE (OUTPATIENT)
Dept: INTERNAL MEDICINE CLINIC | Facility: CLINIC | Age: 58
End: 2018-10-19

## 2018-10-19 NOTE — TELEPHONE ENCOUNTER
----- Message from Chapo Balderrama DO sent at 10/19/2018  9:42 AM EDT -----  Not sure if Yessy Lux rec'd his Liver ultrasound results yet but it shows fatty changes in liver    Let me know if ?'s

## 2018-10-22 ENCOUNTER — OFFICE VISIT (OUTPATIENT)
Dept: SPEECH THERAPY | Facility: CLINIC | Age: 58
End: 2018-10-22
Payer: COMMERCIAL

## 2018-10-22 ENCOUNTER — OFFICE VISIT (OUTPATIENT)
Dept: PHYSICAL THERAPY | Facility: CLINIC | Age: 58
End: 2018-10-22
Payer: COMMERCIAL

## 2018-10-22 DIAGNOSIS — R48.8 OTHER SYMBOLIC DYSFUNCTIONS: Primary | ICD-10-CM

## 2018-10-22 DIAGNOSIS — I63.9 ISCHEMIC CEREBROVASCULAR ACCIDENT (CVA) (HCC): ICD-10-CM

## 2018-10-22 DIAGNOSIS — R41.89 COGNITIVE IMPAIRMENT: ICD-10-CM

## 2018-10-22 DIAGNOSIS — Z74.09 IMPAIRED FUNCTIONAL MOBILITY, BALANCE, AND ENDURANCE: ICD-10-CM

## 2018-10-22 DIAGNOSIS — I63.9 CEREBROVASCULAR ACCIDENT (CVA), UNSPECIFIED MECHANISM (HCC): Primary | ICD-10-CM

## 2018-10-22 PROCEDURE — 97112 NEUROMUSCULAR REEDUCATION: CPT

## 2018-10-22 PROCEDURE — 97110 THERAPEUTIC EXERCISES: CPT

## 2018-10-22 PROCEDURE — 92507 TX SP LANG VOICE COMM INDIV: CPT

## 2018-10-22 NOTE — PROGRESS NOTES
Daily Speech Treatment Note    Today's date: 10/22/2018  Patients name: Keyla Bermudez  : 1960  MRN: [id number]  Safety measures: CVA   Referring provider: Jesica De León MD    Primary Diagnosis/Billing code: R30 9  Secondary Diagnosis/ Billing code: I63 9, I69 322     Visit Tracking:  -Referring provider: EPIC   -Billing guidelines: AMA  -Visit #   -[facility]  [facility]  (Marium-no auth;BOMN    [facility]-auth post 24 visits)   -RE due 11/15/2018    Subjective/Behavioral:    -"Oh I just can't remember it" referring to his new home phone number  Objective/Assessment:  Pt forgot his HEP folder  Reports he did not review his memory book  During session, pt reviewed memory book  Short-term goals:    1  Patient will be educated on oral motor exercises and provided an appropriate at home exercise program to be practiced to facilitate improved strength and function for increased speech intelligibility  To be achieved in 4-6 weeks  2  Patient will be educated on word finding strategies (i e , circumlocution) for improved generative naming and verbal expression skills  3  Patient will complete concrete and abstract categorization tasks to 80% accuracy to facilitate improved generative naming skills and working memory, to be achieved in 4-6 weeks  To target generative naming with initial letter and category provided, patient completed category members acitvity (i e , SPORT, begins with /g/)  Patient appropriately named  words; increasing to  with verbal cues  4  Patient will be educated on the use of internal and external memory aids and compensatory strategies with 80% accuracy to facilitate increased recall of routine, personal information, and recent events, to be achieved in 4-6 weeks  With pictures of family members, pt able to name family member on picture independently in  opp  Pt recalled his address and birthday  Unable to recall his cell and home phone numbers   Used "repeat and rehearse" strategy  Able to immediately recall home number  Throughout session, pt was asked his home number  Able to recall in 2/3 opp  5  Patient will complete auditory immediate and short term memory tasks to 80% accuracy to facilitate increased ability to retell narratives and recall information within functional living environment, to be achieved in 4-6 weeks  Fill in blanks with recall: Patient was asked to fill in missing letters within a 10 word list of words within a category (i e , fruits; o_an_e (orange))  Able to fill in words in 13/20 opp independently, increasing to 20/20 with mod verbal semantic cues  Improvement! Patient was then educated on "repeat and rehearse" memory strategy, and asked to use this strategy to study or memorize the two lists of words  Immediate recall completed in 5/10 opp, increasing to 7/10 while clinician read aloud list of words again  Task completed with 2nd list; immediate recall in  4/10; increasing to 10/10 with verbal phonemic cues  6  Patient will provide an adequate response to generative naming task (i e , name as many) with 80% accuracy to facilitate increased expressive language skills, to be achieved in 4-6 weeks  7  Patient will provide an adequate response to confrontation naming task (i e , what is) with 80% accuracy to facilitate increased expressive language skills, to be achieved in 4-6 weeks  8  Patient will name up to 5 features to describe a person/place/thing with 80% accuracy to facilitate improved utilization of circumlocution strategy, to be achieved in 4-6 weeks  9  Patient will complete picture description tasks using language organization strategies with 80% accuracy to facilitate improved utilization of circumlocution strategy, to be achieved in 4-6 weeks      10  Patient will complete reading comprehension tasks (e g , answering questions, following complex written directions, etc ) to 80% accuracy to facilitate carryover of comprehension of functional reading materials, to be achieved in 4-6 weeks  Plan:   -Patient was provided with home exercises/activities to target goals in plan of care at the end of today's session   -Continue with current plan of care

## 2018-10-22 NOTE — PROGRESS NOTES
Daily Note     Today's date: 10/22/2018  Patient name: Helena Snow  : 1960  MRN: [id number]  Referring provider: Denisse Gutierrez MD  Dx:   Encounter Diagnosis     ICD-10-CM    1  Cerebrovascular accident (CVA), unspecified mechanism ([facility] [address] ) I63 9    2  Impaired functional mobility, balance, and endurance Z74 09                   Subjective: Patient asked therapist to take tape off his back  Referring to the KT  Tape therapist  taped over R UT into posterior shoulder last OT treatment  He noted that where tape was it felt achy  Therapist removed tapetoday in PT and informed OT therapist  Slight Redness noted under tape  Patient noted that he felt better with tape removed and OT therapist let his wife know to remove tape next time after 3 days  Objective: See treatment diary below      Assessment: Tolerated treatment fair  Patient needed VC to correct form for side stepping to keep feet forward  Patient demonstrated fatigue post treatment and would benefit from continued PT      Plan: Continue per plan of care        Precautions: FALL risk, DM II, HTN, spinal stimulator (no stim), CDK, poor safety awareness, *Fear of Dogs*     Daily Treatment Diary      Manual                                                                                                                                                      Exercise Diary  10/22  9/25  10/4  10/9  10/11  10/17   Nustep 10 min L5  L5, 10 min  10 min   L5, 10 min  L5, 10 min   10 min L6   STS 2x10          10x2   Static balance    FAEC 30" x 2  FTEC 30" x 1  FTEC, firm 30"x3  FTEO, foam 30" x 3  FTEO, foam 30" x 3     Ambulation       CL S, no  ft  CL S, no  ft     Semi tandem               Sidestepping  3 laps no UE  solo, 3 laps        3 laps no UE   Alternating step taps   6'', 2x10 cone taps 30x    30x      Weaving in between [employer], 3 laps    3 laps  2 laps      Stepping over hurdles   Solo, fwd/lateral, 3 laps  solo 3 laps ea   2 laps  2 laps, CGA     Marching 3 laps no UE  solo, 3 laps        3 laps no UE   Step ups  fwd/lat 2x10 ea   solo, 6'', Fwd, 2x10 solo 2 x 15 15x  2 x 10   fwd/lat 2x10 ea     backwards walking  Solo, 3 laps solo 3 laps    3 laps     Ambulation with 360 deg        2 laps  2 laps      hurdles 2 laps fwd/lat ea no ue          2 laps fwd/lat ea no ue                                                                         Modalities

## 2018-10-23 NOTE — PROGRESS NOTES
Physical Medicine & Rehabilitation Follow-up Evaluation  Deisy Sanchez 62 y o  male      ASSESSMENT/PLAN:     Patient is seen today approximately 3 month following his discharge from acute inpatient rehabilitation at [facility] status post a left internal capsule, thalamus and hypothalamus ischemic stroke  Functional deficits include right nancy paresis, right coordination and balance deficits, left cranial nerve 3 palsy improving, moderate cognitive deficits, aphasia  Patient is progressing in rehabilitation currently in an outpatient program neuro rehabilitation here at [address] and he is in PT, OT and SLP  He is progressing as expected at 3 months post CVA  He is now a Supervision - Mod I level with all his mobility and self care  His speech expression has markedely improved  Additionally his left ptosis is almost completely resolved  Patient reports he walks in his home without the walker sometimes  I have strongly encouraged patient to continue to use his walker even in the home as his right leg fatigues after 30-40 feet and his foot clearance is NOT yet safe to ambulate consistently without walker  Patient remains a high fall risk without walker  Patient and wife report understanding  Patient does state that his mood has improved since being started on Lexapro     Patient should continue Lexapro 5mg Daily for any additional 6 months if needed post CVA  I have also encouraged him and his wife to attend [facility] stroke support group [facility]  Interim medical update: Patient is having diabetic retinopathy injections with Dr Hina Kebede, Ophthalmology        Diagnoses and all orders for this visit:    History of ischemic cerebrovascular accident (CVA) with residual deficit  - continue outpatient PT, OT, SLP    Cerebrovascular accident (CVA), unspecified mechanism ([facility] [address] )  -     Ambulatory referral to Neurology    Cognitive impairment  -continue outpatient SLP    Bladder incontinence: Related to neurogenic bladder  -resolving only occasional episode at night      *I have spent 25 minutes with Patient and family today in which greater than 50% of this time was spent in counseling/coordination of care regarding Intructions for management, Patient and family education and Impressions  HPI:   Candace Kapoor 62 y o  male right handed, with  has a past medical history of Diabetes mellitus ([facility] [address] ); Hypercholesteremia; Hyperlipidemia; Hypertension; Neuropathy; Obesity; Osteomyelitis ([facility] [address] ); PVC's (premature ventricular contractions); and Stroke ([facility] [address] )  Old records were reviewed personally  Patient is originally from [place] and speaks both Slovak and [employer]  Patient sustained CVA on 7/29/2018  Recevied IV tPA  Admitted to the [facility] from 8/2/18 - 8/13/18  Patient was discharged home in the care of his family  During his stay at the [facility] patient had worsening renal function with his new creatinine baseline 2 2 - 2 4  Patient does follow with [facility]  Patient is continued currently in outpatient PT, OT, SLP    Today patient denies headaches, difficulty with sleep or eating  Mood is improving  Has mild right shoulder pain which he describes as soreness  Patient is very happy with his recovery thus far and has made marked improvement in outpatient therapy  Expanded Social History:  Patient lives with family member in a 3 story house with 3 steps to enter  First floor set-up  Patient is employed  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  Driving: No      Function:   Current Level of Function:   Supervision - Mod I with transfers, ambulation, ADLs     Prior to level of function:  [place]      Review of Systems   Eyes: Positive for visual disturbance (diabetic retinopathy)     All other systems reviewed and are negative        OBJECTIVE:   /70   Pulse 68   Ht 5' 10" (1 778 m)   Wt 108 kg (237 lb)   BMI 34 01 kg/m²      Physical Exam  Physical Exam Constitutional: He appears well-developed and well-nourished  HENT:   Head: Normocephalic and atraumatic  Eyes: Pupils are equal, round, and reactive to light  EOM are normal    Cardiovascular: Normal rate and regular rhythm  Pulmonary/Chest: Breath sounds normal  He has no wheezes  He has no rales  Abdominal: Soft  Bowel sounds are normal  He exhibits no distension  There is no tenderness  Musculoskeletal:   Right shoulder with minimal subluxation felt  Pain at the biceps tendon with palpation  Kinesiotape in place providing support  Spasticity testing - very mild in RUE only   Skin: Skin is warm  Psychiatric: He has a normal mood and affect  Nursing note and vitals reviewed  Neuro:  LEFT CN3 ptosis almost fully resoled  Gait: hemiparetic gait pattern with RW  Patient after 30-40 feet does have a right foot scuf heard even with RW  Motor Exam:   RUE: 4+/5 throughout  RLE 4+/5 througout    Imaging: I personally reviewed pertinent imaging        Past Medical History:   Diagnosis Date    Diabetes mellitus ([facility] [address] )     Hypercholesteremia     Hyperlipidemia     Hypertension     Neuropathy     Obesity     Osteomyelitis ([facility] [address] )     last assessed 11/4/16    PVC's (premature ventricular contractions)     sees cardiology Dr Mariano camargo    Stroke [facility])     last weeof July 2018 [facility]       Patient Active Problem List    Diagnosis Date Noted    Cerebrovascular accident (CVA) ([facility] [address] ) 10/04/2018    Abnormal EEG 09/24/2018    Other constipation 08/17/2018    GERD (gastroesophageal reflux disease) 08/13/2018    Diabetic macular edema ([facility] [address] ) 08/09/2018    Cognitive impairment 08/03/2018    Elevated alkaline phosphatase level 08/03/2018    CKD (chronic kidney disease) 55/46/7110    Embolic stroke of left basal ganglia ([facility] [address] ) 07/29/2018    Benign hypertension with CKD (chronic kidney disease) stage III ([address] ) 10/25/2016    Cirrhosis ([facility] [address] ) 08/17/2016    Type 2 diabetes mellitus with hypoglycemia, with long-term current use of insulin ([facility] ) 02/26/2016    Mixed hyperlipidemia 02/26/2016    Diabetic polyneuropathy associated with type 2 diabetes mellitus ([facility] ) 01/26/2016       Past Surgical History:   Procedure Laterality Date    ABDOMINAL SURGERY      CHOLECYSTECTOMY      Percutaneous    OTHER SURGICAL HISTORY      "stimulator to control bowel movements"    HI ESOPHAGOGASTRODUODENOSCOPY TRANSORAL DIAGNOSTIC N/A 9/27/2016    Procedure: ESOPHAGOGASTRODUODENOSCOPY (EGD); Surgeon: Wojciech Hatfield MD;  [facility]: [facility];   Service: Gastroenterology    HI LAP,CHOLECYSTECTOMY N/A 2/29/2016    Procedure: LAPAROSCOPIC CHOLECYSTECTOMY ;  Surgeon: Inés Mancia DO;  [facility];  Service: General    ROTATOR CUFF REPAIR Right     TOE AMPUTATION Right 10/28/2016    Procedure: 3RD TOE AMPUTATION ;  Surgeon: Elena Berrios DPM;  [facility];  Service:        Family History   Problem Relation Age of Onset    Leukemia Mother     Liver disease Mother     Lung cancer Mother         heavy smoker - 3 ppd    Heart disease Father     Liver disease Father     Multiple myeloma Sister     Breast cancer Sister     Urolithiasis Family     Alcohol abuse Neg Hx     Depression Neg Hx     Drug abuse Neg Hx     Substance Abuse Neg Hx        Social History     No Known Allergies      Current Outpatient Prescriptions:     amLODIPine (NORVASC) 5 mg tablet, Take 1 tablet (5 mg total) by mouth every 12 (twelve) hours, Disp: 180 tablet, Rfl: 1    aspirin (ECOTRIN LOW STRENGTH) 81 mg EC tablet, Take 81 mg by mouth daily Resume on 8/14, Disp: , Rfl:     atorvastatin (LIPITOR) 80 mg tablet, Take 1 tablet (80 mg total) by mouth daily with dinner, Disp: 30 tablet, Rfl: 3    B-D INS SYRINGE 0 5CC/30GX1/2" 30G X 1/2" 0 5 ML MISC, Inject under the skin 4 (four) times a day, Disp: 360 each, Rfl: 1    Blood Glucose Monitoring Suppl (ONE TOUCH ULTRA 2) w/Device KIT, by Does not apply route 3 (three) times a day, Disp: 1 each, Rfl: 0    Blood Pressure Monitoring (BLOOD PRESSURE CUFF) MISC, Use to check blood pressure before taking blood pressure medication and 1 hour after and follow instructions provided in discharge instructions based on the readings  , Disp: 1 each, Rfl: 0    carvedilol (COREG) 6 25 mg tablet, Take 1 tablet (6 25 mg total) by mouth 2 (two) times a day with meals, Disp: 180 tablet, Rfl: 1    Cholecalciferol (VITAMIN D3) 08386 units CAPS, Take 50,000 Units by mouth once a week, Disp: , Rfl: 5    cholecalciferol 47568 units TABS, Take 1 tablet (50,000 Units total) by mouth once a week, Disp: 5 tablet, Rfl: 5    cyanocobalamin 1000 MCG tablet, Take 1 tablet (1,000 mcg total) by mouth daily, Disp: 30 tablet, Rfl: 5    diphenoxylate-atropine (LOMOTIL) 2 5-0 025 mg per tablet, Take 1 tablet by mouth 4 (four) times a day as needed for diarrhea, Disp: , Rfl:     escitalopram (LEXAPRO) 5 mg tablet, Take 1 tablet (5 mg total) by mouth daily, Disp: 90 tablet, Rfl: 0    famotidine (PEPCID) 20 mg tablet, Take 20 mg by mouth daily Resume on 8/14, Disp: , Rfl:     gabapentin (NEURONTIN) 250 mg/5 mL solution, Take 12 mL (600 mg total) by mouth daily at bedtime, Disp: 470 mL, Rfl: 2    glucose 4 g chewable tablet, Chew 3 tablets (12 g total) as needed for low blood sugar, Disp: 50 tablet, Rfl: 0    glucose blood (ACCU-CHEK ACTIVE STRIPS) test strip, 1 each by Other route 3 (three) times a day Use as instructed, Disp: 300 each, Rfl: 1    glucose blood test strip, 1 each by Other route 4 (four) times a day Use as instructed, Disp: 360 each, Rfl: 1    Incontinence Supplies (MALE URINAL) MISC, by Does not apply route daily, Disp: 6 each, Rfl: 3    insulin aspart (NovoLOG) 100 units/mL injection, Inject 4 Units under the skin 3 (three) times a day before meals, Disp: 11 mL, Rfl: 1    insulin glargine (LANTUS) 100 units/mL subcutaneous injection, Inject 35 Units under the skin daily, Disp: 30 mL, Rfl: 1    lactulose 20 g/30 mL, Take 30 mL (20 g total) by mouth daily as needed (constipation), Disp: 473 mL, Rfl: 0    neomycin-bacitracin-polymyxin b (NEOSPORIN) ointment, Apply 1 application topically 2 (two) times a day Apply twice per day to shin wound until fully healed  If not fully healed in 5 days contact your family doctor   Next application is the evening of 8/13, Disp: , Rfl:     ondansetron (ZOFRAN-ODT) 4 mg disintegrating tablet, Take 1 tablet (4 mg total) by mouth every 6 (six) hours as needed for nausea or vomiting, Disp: 20 tablet, Rfl: 0

## 2018-10-24 ENCOUNTER — OFFICE VISIT (OUTPATIENT)
Dept: OCCUPATIONAL THERAPY | Facility: CLINIC | Age: 58
End: 2018-10-24
Payer: COMMERCIAL

## 2018-10-24 DIAGNOSIS — I69.30 HISTORY OF ISCHEMIC CEREBROVASCULAR ACCIDENT (CVA) WITH RESIDUAL DEFICIT: Primary | ICD-10-CM

## 2018-10-24 PROCEDURE — 97110 THERAPEUTIC EXERCISES: CPT

## 2018-10-24 PROCEDURE — 97112 NEUROMUSCULAR REEDUCATION: CPT

## 2018-10-24 PROCEDURE — 97140 MANUAL THERAPY 1/> REGIONS: CPT

## 2018-10-24 NOTE — PROGRESS NOTES
Daily Note     Today's date: 10/24/2018  Patient name: Dennis Singh  : 1960  MRN: [id number]  Referring provider: Joaquin Jensen MD  Dx:   Encounter Diagnosis   Name Primary?  History of ischemic cerebrovascular accident (CVA) with residual deficit Yes                  Subjective: "I can't go back any farther, it hurts "      Objective: See treatment below  UEB bilaterally 5min prograde, 5min retrograde against 1 5# resistance for BUE endurance, followed by seated IASTM to R upper trap, delt, and biceps for tone reduction and sensory re-ed  Supine neuro re-ed utilizing 2# weighted therabar to complete 3x10 reps OH reach, chest press, and gh joint horizontal ABD/ADD, for increased symmetry of movement with BUE, and RUE strength/endurance  BITS in stance completing bell visual scanning/tracking task for RUE midline crossing, divided attention, R side awareness  K Taping around R gh joint and biceps to promote scapular retraction and depression, and to reduce L upper trap tone  Assessment: Tolerated treatment well  Required rest breaks x3 during UEB due to poor endurance and c/o fatigue, with mod verbal cues for encouragement and pacing  Fair symmetry of BUE movement during supine TherEx, noted with RUE lag and requiring verbal cues for full elbow ext and to reach through full range, fair carryover noted  Pt c/o pain in R gh joint during shoulder flexion  Noted with no ataxic movement during BITS, requiring mod verbal cues to visually scan all of R side of screen  Plan: Continue skilled OT per POC with focus on divided attention, R side awareness, RUE motor/sensory re-ed, RUE strength/endurance        INTERVENTION COMMENTS:  Diagnosis: History of ischemic cerebrovascular accident (CVA) with residual deficit [I69 30]  Precautions: HTN  FOTO: 75 with 25% limitation  Luz Candelaria [[id number]]  18 EU 88 OVUCZG, PN due 11/15/18

## 2018-10-25 ENCOUNTER — OFFICE VISIT (OUTPATIENT)
Dept: SPEECH THERAPY | Facility: CLINIC | Age: 58
End: 2018-10-25
Payer: COMMERCIAL

## 2018-10-25 ENCOUNTER — OFFICE VISIT (OUTPATIENT)
Dept: PHYSICAL THERAPY | Facility: CLINIC | Age: 58
End: 2018-10-25
Payer: COMMERCIAL

## 2018-10-25 ENCOUNTER — OFFICE VISIT (OUTPATIENT)
Dept: NEUROLOGY | Facility: CLINIC | Age: 58
End: 2018-10-25
Payer: COMMERCIAL

## 2018-10-25 VITALS
HEIGHT: 70 IN | WEIGHT: 237 LBS | SYSTOLIC BLOOD PRESSURE: 102 MMHG | BODY MASS INDEX: 33.93 KG/M2 | HEART RATE: 68 BPM | DIASTOLIC BLOOD PRESSURE: 70 MMHG

## 2018-10-25 DIAGNOSIS — Z74.09 IMPAIRED FUNCTIONAL MOBILITY, BALANCE, AND ENDURANCE: ICD-10-CM

## 2018-10-25 DIAGNOSIS — I63.9 CEREBROVASCULAR ACCIDENT (CVA), UNSPECIFIED MECHANISM (HCC): Primary | ICD-10-CM

## 2018-10-25 DIAGNOSIS — R48.8 OTHER SYMBOLIC DYSFUNCTIONS: Primary | ICD-10-CM

## 2018-10-25 DIAGNOSIS — I63.9 CEREBROVASCULAR ACCIDENT (CVA), UNSPECIFIED MECHANISM (HCC): ICD-10-CM

## 2018-10-25 DIAGNOSIS — Z74.09 IMPAIRED MOBILITY AND ACTIVITIES OF DAILY LIVING: Primary | ICD-10-CM

## 2018-10-25 DIAGNOSIS — R41.89 COGNITIVE IMPAIRMENT: ICD-10-CM

## 2018-10-25 DIAGNOSIS — I63.9 ISCHEMIC CEREBROVASCULAR ACCIDENT (CVA) (HCC): ICD-10-CM

## 2018-10-25 DIAGNOSIS — Z78.9 IMPAIRED MOBILITY AND ACTIVITIES OF DAILY LIVING: Primary | ICD-10-CM

## 2018-10-25 DIAGNOSIS — I69.322 DYSARTHRIA AS LATE EFFECT OF CEREBELLAR CEREBROVASCULAR ACCIDENT (CVA): ICD-10-CM

## 2018-10-25 PROCEDURE — 99214 OFFICE O/P EST MOD 30 MIN: CPT | Performed by: PHYSICAL MEDICINE & REHABILITATION

## 2018-10-25 PROCEDURE — 92507 TX SP LANG VOICE COMM INDIV: CPT

## 2018-10-25 PROCEDURE — 97112 NEUROMUSCULAR REEDUCATION: CPT

## 2018-10-25 PROCEDURE — 97110 THERAPEUTIC EXERCISES: CPT

## 2018-10-25 NOTE — PROGRESS NOTES
Daily Note     Today's date: 10/25/2018  Patient name: Cris Medina  : 1960  MRN: [id number]  Referring provider: Dino Gupta MD  Dx:   Encounter Diagnosis     ICD-10-CM    1  Cerebrovascular accident (CVA), unspecified mechanism ([facility] [address] ) I63 9    2  Impaired functional mobility, balance, and endurance Z74 09          Subjective: Took patients tape off again from OT treatment due to patient noting that he had increased achy pain where tape was informed MIRNA  Slight redness where tape was applied  Patient noted fatigue with NuStep  Objective: See treatment diary below      Assessment: Tolerated treatment fair  Patient needed VC for eccentric control and to keep feet on the ground when he is performing sit-> stands  Patient also needed VC for no UE with side stepping over hurdles  Patient demonstrated fatigue post treatment  Plan: Continue per plan of care        Precautions: FALL risk, DM II, HTN, spinal stimulator (no stim), CDK, poor safety awareness, *Fear of Dogs*     Daily Treatment Diary      Manual                                                                                                                                                      Exercise Diary  10/22 10/25  10/4  10/9  10/11  10/17   Nustep 10 min L5 10 min L5  10 min   L5, 10 min  L5, 10 min   10 min L6   STS 2x10 2x10        10x2   Static balance   FAEC 30" x 2  FTEC 30" x 1  FTEC, firm 30"x3  FTEO, foam 30" x 3  FTEO, foam 30" x 3     Ambulation      CL S, no  ft  CL S, no  ft     Semi tandem              Sidestepping  3 laps no UE  1# x 2 laps   1 lap no weight         3 laps no UE   Alternating step taps   cone taps 30x    30x      Weaving in between cones      3 laps  2 laps      Stepping over hurdles    solo 3 laps ea   2 laps  2 laps, CGA     Marching 3 laps no UE 3 laps no UE        3 laps no UE   Step ups  fwd/lat 2x10 ea   fwd/lat 2x10 ea  solo 2 x 15 15x  2 x 10   fwd/lat 2x10 ea     backwards walking solo 3 laps    3 laps     Ambulation with 360 deg       2 laps  2 laps      hurdles 2 laps fwd/lat ea no ue  3 laps fwd/lat no ue       2 laps fwd/lat ea no ue                                                                         Modalities

## 2018-10-25 NOTE — PROGRESS NOTES
Daily Speech Treatment Note    Today's date: 10/25/2018  Patients name: Keyla Bermudez  : 1960  MRN: [id number]  Safety measures: CVA   Referring provider: Jesica De León MD    Primary Diagnosis/Billing code: O45 5  Secondary Diagnosis/ Billing code: I63 9, I69 322     Visit Tracking:  -Referring provider: EPIC   -Billing guidelines: AMA  -Visit #   -[facility]  [facility]  ([place]-no auth;BOMN    [facility]-auth post 24 visits)   -RE due 11/15/2018    Subjective/Behavioral:    "I guess I'll practice " Pt is not practicing his dysarthria exercises at home nor bringing back his HEP  Wife reports Rita Huber saw Dr Graciela Richardson today (neurologist) for a f/u visit  Per wife, he is "doing good", he has started an SSRI 1 week ago, and he is going to attempt to see counseling services  Dr Graciela Richardson recommended neuropsych  Will continue to follow  Objective/Assessment:  Wife present during session  Pt is still not bringing in his HEP  Short-term goals:    1  Patient will be educated on oral motor exercises and provided an appropriate at home exercise program to be practiced to facilitate improved strength and function for increased speech intelligibility  To be achieved in 4-6 weeks  Pt and wife were re-educated on oral motor exercises and importance regarding his articulation  Tongue Tip, Side of Tongue, and ROM Tongue exercises were re-clarified  Clinician demonstrated and then pt completed 10 repetitions of each exercise 1x  Pt and wife were instructed to complete OME's at home daily  Pt verbally agreed  2  Patient will be educated on word finding strategies (i e , circumlocution) for improved generative naming and verbal expression skills  3  Patient will complete concrete and abstract categorization tasks to 80% accuracy to facilitate improved generative naming skills and working memory, to be achieved in 4-6 weeks    To target generative naming with initial letter and category provided, patient completed category members acitvity (i e , SPORT, begins with /g/)  Patient appropriately named 2/10 words; increasing to 4/10 with verbal cues  Pt was provided with 3 items and asked to name the category  Task completed independently in 13/25 opp; increasing to 20/25 with min verbal semantic cues  Noted carrier phrase, "so they all are ___" was helpful  4  Patient will be educated on the use of internal and external memory aids and compensatory strategies with 80% accuracy to facilitate increased recall of routine, personal information, and recent events, to be achieved in 4-6 weeks  With pictures of family members, pt able to name family member on picture independently in 24/24 opp  Pt recalled his address and birthday  Unable to recall his cell and home phone numbers on first try  Re-memorized phone numbers with "repeat and rehearse" strategy  Recalled both numbers in ~75% of opportunities  Continued "testing" pt with phone numbers throughout session and pt had mod difficulty; repeated number  5  Patient will complete auditory immediate and short term memory tasks to 80% accuracy to facilitate increased ability to retell narratives and recall information within functional living environment, to be achieved in 4-6 weeks  Fill in blanks with recall: Patient was asked to fill in missing letters within a 10 word list of words within a category (i e , fruits; o_an_e (orange))  Able to fill in words in 5/10 opp independently, increasing to 10/10 with mod verbal semantic cues  Pt then used "repeat and rehearse" strategy to remember items  Immediately recalled 5/10 items, increasing to 10/10 with mod verbal semantic cues  6  Patient will provide an adequate response to generative naming task (i e , name as many) with 80% accuracy to facilitate increased expressive language skills, to be achieved in 4-6 weeks       7  Patient will provide an adequate response to confrontation naming task (i e , what is) with 80% accuracy to facilitate increased expressive language skills, to be achieved in 4-6 weeks  8  Patient will name up to 5 features to describe a person/place/thing with 80% accuracy to facilitate improved utilization of circumlocution strategy, to be achieved in 4-6 weeks  9  Patient will complete picture description tasks using language organization strategies with 80% accuracy to facilitate improved utilization of circumlocution strategy, to be achieved in 4-6 weeks  10  Patient will complete reading comprehension tasks (e g , answering questions, following complex written directions, etc ) to 80% accuracy to facilitate carryover of comprehension of functional reading materials, to be achieved in 4-6 weeks  Plan:   -Patient was provided with home exercises/activities to target goals in plan of care at the end of today's session   -Continue with current plan of care

## 2018-10-26 ENCOUNTER — OFFICE VISIT (OUTPATIENT)
Dept: INTERNAL MEDICINE CLINIC | Facility: CLINIC | Age: 58
End: 2018-10-26
Payer: COMMERCIAL

## 2018-10-26 VITALS
SYSTOLIC BLOOD PRESSURE: 138 MMHG | TEMPERATURE: 97.3 F | HEIGHT: 70 IN | RESPIRATION RATE: 18 BRPM | WEIGHT: 235.4 LBS | HEART RATE: 72 BPM | BODY MASS INDEX: 33.7 KG/M2 | DIASTOLIC BLOOD PRESSURE: 78 MMHG | OXYGEN SATURATION: 96 %

## 2018-10-26 DIAGNOSIS — E11.649 TYPE 2 DIABETES MELLITUS WITH HYPOGLYCEMIA WITHOUT COMA, WITH LONG-TERM CURRENT USE OF INSULIN (HCC): Primary | ICD-10-CM

## 2018-10-26 DIAGNOSIS — Z79.4 TYPE 2 DIABETES MELLITUS WITH HYPOGLYCEMIA WITHOUT COMA, WITH LONG-TERM CURRENT USE OF INSULIN (HCC): Primary | ICD-10-CM

## 2018-10-26 DIAGNOSIS — F32.A ANXIETY AND DEPRESSION: ICD-10-CM

## 2018-10-26 DIAGNOSIS — I12.9 BENIGN HYPERTENSION WITH CKD (CHRONIC KIDNEY DISEASE) STAGE III (HCC): ICD-10-CM

## 2018-10-26 DIAGNOSIS — N18.30 BENIGN HYPERTENSION WITH CKD (CHRONIC KIDNEY DISEASE) STAGE III (HCC): ICD-10-CM

## 2018-10-26 DIAGNOSIS — F41.9 ANXIETY AND DEPRESSION: ICD-10-CM

## 2018-10-26 LAB — SL AMB POCT GLUCOSE BLD: 184

## 2018-10-26 PROCEDURE — 99213 OFFICE O/P EST LOW 20 MIN: CPT | Performed by: INTERNAL MEDICINE

## 2018-10-26 PROCEDURE — 82948 REAGENT STRIP/BLOOD GLUCOSE: CPT | Performed by: INTERNAL MEDICINE

## 2018-10-26 PROCEDURE — 3008F BODY MASS INDEX DOCD: CPT | Performed by: INTERNAL MEDICINE

## 2018-10-26 RX ORDER — LANCETS
EACH MISCELLANEOUS 4 TIMES DAILY
Qty: 360 EACH | Refills: 1 | Status: SHIPPED | OUTPATIENT
Start: 2018-10-26 | End: 2018-11-27

## 2018-10-26 RX ORDER — ESCITALOPRAM OXALATE 5 MG/1
5 TABLET ORAL DAILY
Qty: 30 TABLET | Refills: 2 | Status: SHIPPED | OUTPATIENT
Start: 2018-10-26 | End: 2019-05-13 | Stop reason: SDUPTHER

## 2018-10-26 NOTE — PROGRESS NOTES
Assessment/Plan:     Diagnoses and all orders for this visit:    Type 2 diabetes mellitus with hypoglycemia without coma, with long-term current use of insulin ([facility])  Comments:  increase dinner novolog and add small dose novolog with snack(or have low carb/high protein snack), d/w pt  Orders:  -     Insulin Syringe-Needle U-100 (B-D INS SYR ULTRAFINE  3CC/30G) 30G X 1/2" 0 3 ML MISC; by Does not apply route 4 (four) times a day  -     glucose blood (ACCU-CHEK POWER) test strip; 1 each by Other route 4 (four) times a day  -     ACCU-CHEK SOFTCLIX LANCETS lancets; by Other route 4 (four) times a day  -     POCT blood glucose  -     Hemoglobin A1C; Future    Anxiety and depression  Comments:  doing better -> c/w lexapro, f/u in 2 mos  Orders:  -     escitalopram (LEXAPRO) 5 mg tablet; Take 1 tablet (5 mg total) by mouth daily    Benign hypertension with CKD (chronic kidney disease) stage III ([place] [address] )  Comments:  BP doing well, c/w BB/CCB          Subjective:      Patient ID: Silvana Reyes is a 62 y o  male  HPI     Here for follow up, here with wife during appt  Switched to lantus and novolog insuling and blood sugars doing better  No low BS readings  Morning BS are in low 100s, before lunch readings are in 120s, but before dinner readings are in low 200s & after dinner is in 170s  Taking lantux 30 U in morning and 4 u with meals  Seeing GI in December, we reviewed CT A/P findings from 8/2016 and recent US which does not identify cirrhosis  Feeling better overall with lexapro, no other complaints      Past Medical History:   Diagnosis Date    Diabetes mellitus ([facility] )     Hypercholesteremia     Hyperlipidemia     Hypertension     Neuropathy     Obesity     [facility]     last assessed 11/4/16    PVC's (premature ventricular contractions)     sees cardiology Dr Yosef camargo    Stroke [facility])     last weeof July 2018 [address]     Vitals:    10/26/18 1511 10/26/18 1529   BP: 142/76 138/78   Pulse: 72    Resp: 18    Temp: (!) 97 3 °F (36 3 °C)    TempSrc: Oral    SpO2: 96%    Weight: 107 kg (235 lb 6 4 oz)    Height: 5' 10" (1 778 m)      Body mass index is 33 78 kg/m²  Current Outpatient Prescriptions:     amLODIPine (NORVASC) 5 mg tablet, Take 1 tablet (5 mg total) by mouth every 12 (twelve) hours, Disp: 180 tablet, Rfl: 1    aspirin (ECOTRIN LOW STRENGTH) 81 mg EC tablet, Take 81 mg by mouth daily Resume on 8/14, Disp: , Rfl:     atorvastatin (LIPITOR) 80 mg tablet, Take 1 tablet (80 mg total) by mouth daily with dinner, Disp: 30 tablet, Rfl: 3    B-D INS SYRINGE 0 5CC/30GX1/2" 30G X 1/2" 0 5 ML MISC, Inject under the skin 4 (four) times a day, Disp: 360 each, Rfl: 1    Blood Glucose Monitoring Suppl (ONE TOUCH ULTRA 2) w/Device KIT, by Does not apply route 3 (three) times a day, Disp: 1 each, Rfl: 0    Blood Pressure Monitoring (BLOOD PRESSURE CUFF) MISC, Use to check blood pressure before taking blood pressure medication and 1 hour after and follow instructions provided in discharge instructions based on the readings  , Disp: 1 each, Rfl: 0    carvedilol (COREG) 6 25 mg tablet, Take 1 tablet (6 25 mg total) by mouth 2 (two) times a day with meals, Disp: 180 tablet, Rfl: 1    Cholecalciferol (VITAMIN D3) 36942 units CAPS, Take 50,000 Units by mouth once a week, Disp: , Rfl: 5    cholecalciferol 45288 units TABS, Take 1 tablet (50,000 Units total) by mouth once a week, Disp: 5 tablet, Rfl: 5    cyanocobalamin 1000 MCG tablet, Take 1 tablet (1,000 mcg total) by mouth daily, Disp: 30 tablet, Rfl: 5    diphenoxylate-atropine (LOMOTIL) 2 5-0 025 mg per tablet, Take 1 tablet by mouth 4 (four) times a day as needed for diarrhea, Disp: , Rfl:     escitalopram (LEXAPRO) 5 mg tablet, Take 1 tablet (5 mg total) by mouth daily, Disp: 30 tablet, Rfl: 2    famotidine (PEPCID) 20 mg tablet, Take 20 mg by mouth daily Resume on 8/14, Disp: , Rfl:     gabapentin (NEURONTIN) 250 mg/5 mL solution, Take 12 mL (600 mg total) by mouth daily at bedtime, Disp: 470 mL, Rfl: 2    glucose 4 g chewable tablet, Chew 3 tablets (12 g total) as needed for low blood sugar, Disp: 50 tablet, Rfl: 0    Incontinence Supplies (MALE URINAL) MISC, by Does not apply route daily, Disp: 6 each, Rfl: 3    insulin aspart (NovoLOG) 100 units/mL injection, Inject 4 Units under the skin 3 (three) times a day before meals, Disp: 11 mL, Rfl: 1    insulin glargine (LANTUS) 100 units/mL subcutaneous injection, Inject 35 Units under the skin daily, Disp: 30 mL, Rfl: 1    lactulose 20 g/30 mL, Take 30 mL (20 g total) by mouth daily as needed (constipation), Disp: 473 mL, Rfl: 0    neomycin-bacitracin-polymyxin b (NEOSPORIN) ointment, Apply 1 application topically 2 (two) times a day Apply twice per day to shin wound until fully healed  If not fully healed in 5 days contact your family doctor  Next application is the evening of 8/13, Disp: , Rfl:     ondansetron (ZOFRAN-ODT) 4 mg disintegrating tablet, Take 1 tablet (4 mg total) by mouth every 6 (six) hours as needed for nausea or vomiting, Disp: 20 tablet, Rfl: 0    ACCU-CHEK SOFTCLIX LANCETS lancets, by Other route 4 (four) times a day, Disp: 360 each, Rfl: 1    glucose blood (ACCU-CHEK POWER) test strip, 1 each by Other route 4 (four) times a day, Disp: 360 each, Rfl: 1    Insulin Syringe-Needle U-100 (B-D INS SYR ULTRAFINE  3CC/30G) 30G X 1/2" 0 3 ML MISC, by Does not apply route 4 (four) times a day, Disp: 360 each, Rfl: 1  No Known Allergies      Review of Systems   Constitutional: Negative for fever  HENT: Negative for congestion  Eyes: Negative for visual disturbance  Respiratory: Negative for shortness of breath  Cardiovascular: Negative for chest pain  Gastrointestinal: Negative for abdominal pain  Genitourinary: Negative for dysuria  Musculoskeletal: Negative for arthralgias  Skin: Negative for rash     Neurological: Positive for weakness and numbness  Psychiatric/Behavioral: Negative for dysphoric mood  Objective:      /78   Pulse 72   Temp (!) 97 3 °F (36 3 °C) (Oral)   Resp 18   Ht 5' 10" (1 778 m)   Wt 107 kg (235 lb 6 4 oz)   SpO2 96%   BMI 33 78 kg/m²          Physical Exam   Constitutional: He appears well-developed and well-nourished  HENT:   Head: Normocephalic and atraumatic  Eyes: Pupils are equal, round, and reactive to light  Conjunctivae are normal    Cardiovascular: Normal rate, regular rhythm and normal heart sounds  No murmur heard  Pulmonary/Chest: Effort normal and breath sounds normal  He has no wheezes  He has no rales  Abdominal: Soft  Bowel sounds are normal  There is no tenderness  Musculoskeletal: He exhibits no edema  Neurological: He is alert  Psychiatric: He has a normal mood and affect  His behavior is normal    Vitals reviewed

## 2018-10-26 NOTE — PATIENT INSTRUCTIONS
1  Increase novolog to 6 units with dinner  2  Take low carb snack or will need to take 3 units novolog with afternoon snack  3  Continue checking blood sugars  4  Blood work in 2 months  5  Return after blood work  6   Ask GI doctor about CAT scan from 8/16/2016 for Liver

## 2018-10-28 ENCOUNTER — APPOINTMENT (EMERGENCY)
Dept: CT IMAGING | Facility: HOSPITAL | Age: 58
DRG: 638 | End: 2018-10-28
Payer: COMMERCIAL

## 2018-10-28 ENCOUNTER — HOSPITAL ENCOUNTER (INPATIENT)
Facility: HOSPITAL | Age: 58
LOS: 1 days | Discharge: HOME | DRG: 638 | End: 2018-10-29
Attending: EMERGENCY MEDICINE | Admitting: INTERNAL MEDICINE
Payer: COMMERCIAL

## 2018-10-28 ENCOUNTER — APPOINTMENT (EMERGENCY)
Dept: RADIOLOGY | Facility: HOSPITAL | Age: 58
DRG: 638 | End: 2018-10-28
Payer: COMMERCIAL

## 2018-10-28 DIAGNOSIS — N18.30 STAGE 3 CHRONIC KIDNEY DISEASE (HCC): ICD-10-CM

## 2018-10-28 DIAGNOSIS — Z79.4 TYPE 2 DIABETES MELLITUS WITH HYPOGLYCEMIA WITHOUT COMA, WITH LONG-TERM CURRENT USE OF INSULIN (HCC): ICD-10-CM

## 2018-10-28 DIAGNOSIS — E16.2 HYPOGLYCEMIA: ICD-10-CM

## 2018-10-28 DIAGNOSIS — I63.9 CEREBROVASCULAR ACCIDENT (CVA), UNSPECIFIED MECHANISM (HCC): ICD-10-CM

## 2018-10-28 DIAGNOSIS — E11.649 TYPE 2 DIABETES MELLITUS WITH HYPOGLYCEMIA WITHOUT COMA, WITH LONG-TERM CURRENT USE OF INSULIN (HCC): ICD-10-CM

## 2018-10-28 DIAGNOSIS — G45.9 TIA (TRANSIENT ISCHEMIC ATTACK): Primary | ICD-10-CM

## 2018-10-28 DIAGNOSIS — E11.42 DIABETIC POLYNEUROPATHY ASSOCIATED WITH TYPE 2 DIABETES MELLITUS (HCC): ICD-10-CM

## 2018-10-28 PROBLEM — I63.312 CEREBROVASCULAR ACCIDENT (CVA) DUE TO THROMBOSIS OF LEFT MIDDLE CEREBRAL ARTERY (HCC): Status: ACTIVE | Noted: 2018-07-29

## 2018-10-28 PROBLEM — I67.9 CEREBRAL VASCULAR DISEASE: Status: ACTIVE | Noted: 2018-10-28

## 2018-10-28 PROBLEM — R29.90 STROKE-LIKE SYMPTOMS: Status: ACTIVE | Noted: 2018-10-28

## 2018-10-28 LAB
ABO GROUP BLD: NORMAL
ANION GAP BLD CALC-SCNC: 14 MMOL/L (ref 4–13)
ANION GAP SERPL CALCULATED.3IONS-SCNC: 10 MMOL/L (ref 4–13)
APTT PPP: 36 SECONDS (ref 24–36)
BLD GP AB SCN SERPL QL: NEGATIVE
BUN BLD-MCNC: 36 MG/DL (ref 5–25)
BUN SERPL-MCNC: 32 MG/DL (ref 5–25)
CA-I BLD-SCNC: 1.23 MMOL/L (ref 1.12–1.32)
CALCIUM SERPL-MCNC: 9.6 MG/DL (ref 8.3–10.1)
CHLORIDE BLD-SCNC: 109 MMOL/L (ref 100–108)
CHLORIDE SERPL-SCNC: 106 MMOL/L (ref 100–108)
CO2 SERPL-SCNC: 23 MMOL/L (ref 21–32)
CREAT BLD-MCNC: 2.2 MG/DL (ref 0.6–1.3)
CREAT SERPL-MCNC: 2.32 MG/DL (ref 0.6–1.3)
ERYTHROCYTE [DISTWIDTH] IN BLOOD BY AUTOMATED COUNT: 13.3 % (ref 11.6–15.1)
GFR SERPL CREATININE-BSD FRML MDRD: 30 ML/MIN/1.73SQ M
GFR SERPL CREATININE-BSD FRML MDRD: 32 ML/MIN/1.73SQ M
GLUCOSE SERPL-MCNC: 155 MG/DL (ref 65–140)
GLUCOSE SERPL-MCNC: 287 MG/DL (ref 65–140)
GLUCOSE SERPL-MCNC: 75 MG/DL (ref 65–140)
GLUCOSE SERPL-MCNC: 91 MG/DL (ref 65–140)
GLUCOSE SERPL-MCNC: 94 MG/DL (ref 65–140)
HCT VFR BLD AUTO: 42.5 % (ref 36.5–49.3)
HCT VFR BLD CALC: 41 % (ref 36.5–49.3)
HGB BLD-MCNC: 13.9 G/DL (ref 12–17)
HGB BLDA-MCNC: 13.9 G/DL (ref 12–17)
INR PPP: 1.02 (ref 0.86–1.17)
MCH RBC QN AUTO: 28.4 PG (ref 26.8–34.3)
MCHC RBC AUTO-ENTMCNC: 32.7 G/DL (ref 31.4–37.4)
MCV RBC AUTO: 87 FL (ref 82–98)
PCO2 BLD: 23 MMOL/L (ref 21–32)
PLATELET # BLD AUTO: 175 THOUSANDS/UL (ref 149–390)
PMV BLD AUTO: 10.8 FL (ref 8.9–12.7)
POTASSIUM BLD-SCNC: 4 MMOL/L (ref 3.5–5.3)
POTASSIUM SERPL-SCNC: 4 MMOL/L (ref 3.5–5.3)
PROTHROMBIN TIME: 13.1 SECONDS (ref 11.8–14.2)
RBC # BLD AUTO: 4.89 MILLION/UL (ref 3.88–5.62)
RH BLD: POSITIVE
SODIUM BLD-SCNC: 141 MMOL/L (ref 136–145)
SODIUM SERPL-SCNC: 139 MMOL/L (ref 136–145)
SPECIMEN EXPIRATION DATE: NORMAL
SPECIMEN SOURCE: ABNORMAL
SPECIMEN SOURCE: NORMAL
TROPONIN I BLD-MCNC: 0 NG/ML (ref 0–0.08)
WBC # BLD AUTO: 8.56 THOUSAND/UL (ref 4.31–10.16)

## 2018-10-28 PROCEDURE — 96360 HYDRATION IV INFUSION INIT: CPT

## 2018-10-28 PROCEDURE — 99291 CRITICAL CARE FIRST HOUR: CPT

## 2018-10-28 PROCEDURE — 86901 BLOOD TYPING SEROLOGIC RH(D): CPT | Performed by: EMERGENCY MEDICINE

## 2018-10-28 PROCEDURE — 71045 X-RAY EXAM CHEST 1 VIEW: CPT

## 2018-10-28 PROCEDURE — 85027 COMPLETE CBC AUTOMATED: CPT | Performed by: EMERGENCY MEDICINE

## 2018-10-28 PROCEDURE — 82948 REAGENT STRIP/BLOOD GLUCOSE: CPT

## 2018-10-28 PROCEDURE — 85730 THROMBOPLASTIN TIME PARTIAL: CPT | Performed by: EMERGENCY MEDICINE

## 2018-10-28 PROCEDURE — 70498 CT ANGIOGRAPHY NECK: CPT

## 2018-10-28 PROCEDURE — 93005 ELECTROCARDIOGRAM TRACING: CPT

## 2018-10-28 PROCEDURE — 86850 RBC ANTIBODY SCREEN: CPT | Performed by: EMERGENCY MEDICINE

## 2018-10-28 PROCEDURE — 84484 ASSAY OF TROPONIN QUANT: CPT

## 2018-10-28 PROCEDURE — 36415 COLL VENOUS BLD VENIPUNCTURE: CPT | Performed by: EMERGENCY MEDICINE

## 2018-10-28 PROCEDURE — 96361 HYDRATE IV INFUSION ADD-ON: CPT

## 2018-10-28 PROCEDURE — 85610 PROTHROMBIN TIME: CPT | Performed by: EMERGENCY MEDICINE

## 2018-10-28 PROCEDURE — 80047 BASIC METABLC PNL IONIZED CA: CPT

## 2018-10-28 PROCEDURE — 99245 OFF/OP CONSLTJ NEW/EST HI 55: CPT | Performed by: NURSE PRACTITIONER

## 2018-10-28 PROCEDURE — 70496 CT ANGIOGRAPHY HEAD: CPT

## 2018-10-28 PROCEDURE — 85014 HEMATOCRIT: CPT

## 2018-10-28 PROCEDURE — 70450 CT HEAD/BRAIN W/O DYE: CPT

## 2018-10-28 PROCEDURE — 86900 BLOOD TYPING SEROLOGIC ABO: CPT | Performed by: EMERGENCY MEDICINE

## 2018-10-28 PROCEDURE — 99223 1ST HOSP IP/OBS HIGH 75: CPT | Performed by: INTERNAL MEDICINE

## 2018-10-28 PROCEDURE — 80048 BASIC METABOLIC PNL TOTAL CA: CPT | Performed by: EMERGENCY MEDICINE

## 2018-10-28 RX ORDER — SODIUM CHLORIDE 9 MG/ML
75 INJECTION, SOLUTION INTRAVENOUS CONTINUOUS
Status: DISCONTINUED | OUTPATIENT
Start: 2018-10-28 | End: 2018-10-29 | Stop reason: HOSPADM

## 2018-10-28 RX ORDER — ESCITALOPRAM OXALATE 10 MG/1
5 TABLET ORAL DAILY
Status: DISCONTINUED | OUTPATIENT
Start: 2018-10-28 | End: 2018-10-29 | Stop reason: HOSPADM

## 2018-10-28 RX ORDER — ASPIRIN 81 MG/1
81 TABLET ORAL DAILY
Status: DISCONTINUED | OUTPATIENT
Start: 2018-10-29 | End: 2018-10-29 | Stop reason: HOSPADM

## 2018-10-28 RX ORDER — ASPIRIN 325 MG
325 TABLET ORAL ONCE
Status: COMPLETED | OUTPATIENT
Start: 2018-10-28 | End: 2018-10-28

## 2018-10-28 RX ORDER — AMLODIPINE BESYLATE 5 MG/1
5 TABLET ORAL EVERY 12 HOURS
Status: DISCONTINUED | OUTPATIENT
Start: 2018-10-28 | End: 2018-10-29 | Stop reason: HOSPADM

## 2018-10-28 RX ORDER — GABAPENTIN 250 MG/5ML
600 SOLUTION ORAL DAILY
Status: DISCONTINUED | OUTPATIENT
Start: 2018-10-29 | End: 2018-10-29 | Stop reason: HOSPADM

## 2018-10-28 RX ORDER — ATORVASTATIN CALCIUM 40 MG/1
80 TABLET, FILM COATED ORAL
Status: DISCONTINUED | OUTPATIENT
Start: 2018-10-28 | End: 2018-10-29 | Stop reason: HOSPADM

## 2018-10-28 RX ORDER — FAMOTIDINE 20 MG/1
20 TABLET, FILM COATED ORAL DAILY
Status: DISCONTINUED | OUTPATIENT
Start: 2018-10-29 | End: 2018-10-29 | Stop reason: HOSPADM

## 2018-10-28 RX ORDER — GABAPENTIN 250 MG/5ML
600 SOLUTION ORAL
Status: DISCONTINUED | OUTPATIENT
Start: 2018-10-28 | End: 2018-10-28

## 2018-10-28 RX ORDER — INSULIN GLARGINE 100 [IU]/ML
33 INJECTION, SOLUTION SUBCUTANEOUS DAILY
Status: DISCONTINUED | OUTPATIENT
Start: 2018-10-29 | End: 2018-10-29

## 2018-10-28 RX ORDER — DIPHENOXYLATE HYDROCHLORIDE AND ATROPINE SULFATE 2.5; .025 MG/1; MG/1
1 TABLET ORAL 4 TIMES DAILY PRN
Status: DISCONTINUED | OUTPATIENT
Start: 2018-10-28 | End: 2018-10-29 | Stop reason: HOSPADM

## 2018-10-28 RX ORDER — CARVEDILOL 6.25 MG/1
6.25 TABLET ORAL 2 TIMES DAILY WITH MEALS
Status: DISCONTINUED | OUTPATIENT
Start: 2018-10-28 | End: 2018-10-29 | Stop reason: HOSPADM

## 2018-10-28 RX ORDER — CHOLECALCIFEROL (VITAMIN D3) 125 MCG
1000 CAPSULE ORAL DAILY
Status: DISCONTINUED | OUTPATIENT
Start: 2018-10-29 | End: 2018-10-29 | Stop reason: HOSPADM

## 2018-10-28 RX ORDER — HEPARIN SODIUM 5000 [USP'U]/ML
5000 INJECTION, SOLUTION INTRAVENOUS; SUBCUTANEOUS EVERY 8 HOURS SCHEDULED
Status: DISCONTINUED | OUTPATIENT
Start: 2018-10-28 | End: 2018-10-29 | Stop reason: HOSPADM

## 2018-10-28 RX ORDER — LACTULOSE 20 G/30ML
20 SOLUTION ORAL DAILY PRN
Status: DISCONTINUED | OUTPATIENT
Start: 2018-10-28 | End: 2018-10-29 | Stop reason: HOSPADM

## 2018-10-28 RX ADMIN — SODIUM CHLORIDE 1000 ML: 0.9 INJECTION, SOLUTION INTRAVENOUS at 11:45

## 2018-10-28 RX ADMIN — ATORVASTATIN CALCIUM 80 MG: 40 TABLET, FILM COATED ORAL at 16:53

## 2018-10-28 RX ADMIN — SODIUM CHLORIDE 75 ML/HR: 0.9 INJECTION, SOLUTION INTRAVENOUS at 17:12

## 2018-10-28 RX ADMIN — AMLODIPINE BESYLATE 5 MG: 5 TABLET ORAL at 16:53

## 2018-10-28 RX ADMIN — HEPARIN SODIUM 5000 UNITS: 5000 INJECTION, SOLUTION INTRAVENOUS; SUBCUTANEOUS at 21:17

## 2018-10-28 RX ADMIN — CARVEDILOL 6.25 MG: 6.25 TABLET, FILM COATED ORAL at 16:53

## 2018-10-28 RX ADMIN — INSULIN LISPRO 5 UNITS: 100 INJECTION, SOLUTION INTRAVENOUS; SUBCUTANEOUS at 17:13

## 2018-10-28 RX ADMIN — ESCITALOPRAM OXALATE 5 MG: 10 TABLET ORAL at 21:18

## 2018-10-28 RX ADMIN — HEPARIN SODIUM 5000 UNITS: 5000 INJECTION, SOLUTION INTRAVENOUS; SUBCUTANEOUS at 17:12

## 2018-10-28 RX ADMIN — ASPIRIN 325 MG: 325 TABLET ORAL at 12:19

## 2018-10-28 RX ADMIN — IODIXANOL 85 ML: 320 INJECTION, SOLUTION INTRAVASCULAR at 11:40

## 2018-10-28 NOTE — PLAN OF CARE
Activity Intolerance/Impaired Mobility     Mobility/activity is maintained at optimum level for patient Progressing        Communication Impairment     Ability to express needs and understand communication Progressing        Neurological Deficit     Neurological status is stable or improving Progressing        Nutrition     Nutrition/Hydration status is improving Progressing        Potential for Aspiration     Non-ventilated patient's risk of aspiration is minimized Progressing        Potential for Falls     Patient will remain free of falls Progressing

## 2018-10-28 NOTE — ASSESSMENT & PLAN NOTE
· Occurred 3 months ago  · Patient with R sided deficits-- UE and LE weakness, wide-based gait, speech difficulties  · Follows with neurology as an outpatient  · Patient attends PT/OT/speech therapy as an outpatient

## 2018-10-28 NOTE — CONSULTS
Neurology Consult- Donald Ny 1960, 62 y o  male   MRN: [id number] Unit/Bed#: ED 08 Encounter: [id number]      Consult to Neurology  Consult performed by: Shanice Curry  Consult ordered by: Kassy Lopez      Physician Requesting Consult:  Dr Sandeep Isaac  Reason for Consult / Principal Problem:  Stroke alert  Time last known well:  Approximately 10:25 to 10:30 a m  Stroke alert called:  Upon arrival in the ED 10:58 a m  Neurology stroke alert response:  Immediately on the phone, neuro was present in the ED 1136  Hx and PE limited by:  None  Review of previous medical records was completed  The patient is known to both the hospital and office practices  Family, specifically the patient's son Moses Kwan was present at the bedside for history and examination  TPA  Decision: Patient not a TPA candidate  Symptoms resolved/clearly non disabling  TPA Consent: the patient  Stroke-like symptoms, with right-sided weakness   Assessment & Plan    The onset today of his right-sided symptoms, for which a stroke alert was called, seems to have had as the preceding event a period of hypoglycemia  Currently his symptoms have all resolved and there is no significant lateralization to his exam   TPA was held at this time given the improvement in his symptoms  He will be admitted though and worked up overnight with appropriate medical changes deemed necessary as his workup proceeds  He has a December 26th appointment with Dr Jennifer Cody of our stroke team in the office I have encouraged the patient his family keep this appointment  Cerebrovascular accident (CVA) due to likely thrombosis of left middle cerebral artery [facility])   Assessment & Plan    His stroke in July of 2018 had as it is presenting symptoms right-sided deficits with language dysfunction somewhat similar to his presentation this time     I do not see that he had any hypoglycemia at that time    After tPA he remained with a significant what appears to be a basal ganglia infarct on MRI he had improvement of his symptoms  Hypoglycemia, transient episode   Assessment & Plan    The patient's initial blood sugar upon presentation here at the hospital was 75  Reports of his family including an RN who was at the [facility] at the time of this event reports his symptoms as starting with significant diaphoresis and then with a global weakness  They report that they attempted to increase his blood sugar and that after having half to 3/4 of a can of Coke he improved somewhat  His  Of right-sided weakness noted here in the ED was likely a recrudescence of his left basal ganglia stroke that he sustained in July of this year  Type 2 diabetes mellitus with hypoglycemia, with long-term current use of insulin [facility])   Assessment & Plan    Lab Results   Component Value Date    HGBA1C 7 5 (H) 07/28/2018    His family report that he has had episodes of similar hypoglycemia in the past   He has been a diabetic for approximately 10-15 years  Cerebral vascular disease   Assessment & Plan    During the workup for his July of 2018 stroke the patient is noted to have chronic small vessel disease throughout both hemispheres of a moderate distribution  He has been on aspirin previously reportedly with compliance  From His stroke 3 months ago is still noted to have some encephalomalacia on his CT scan done today  He is already on a high dose statin regimen  NIH Stroke Scale Score upon return from the CT scan at 11:36    1 a  Level of consciousness (alert, drowsy, coma)  0   1 b  LOC Question (month, age) Both, 1 correct, neither 0   1 c  LOC commands (eyes; fist) Both, 1 correct, neither 0   2  Best gaze (Tracking) Normal, Partial, gaze paresis 0  3  Visual field cuts- None, Partial nancy, Complete nancy, B Blind 0  4  Facial palsy (teeth, brows and lids shut) Normal, Minor, Partial, Complete 0   5 a   Motor Arm Left  (Elevate and score 10 sec drift) None, Drift (<10 sec), Some effort (falls < 10 sec),  No effort, No movement, Unable to score 0   5 b  Motor Arm Right (Elevate and score 10 sec drift) None, Drift (<10 sec), Some effort (falls < 10 sec),  No effort, No movement, Unable to score 1   6 a  Motor Leg Left (Elevate 30 hold 5 count) None, Drift (<5 sec), Some effort (falls < 5 sec),  No effort, No movement, Unable to score 0   6 b  Motor Leg Right (Elevate 30 hold 5 count) None, Drift (<5 sec), Some effort (falls < 5 sec),  No effort, No movement, Unable to score 1   7  Limb Ataxia (FNF HTS) Absent, Present one limb, Present two limbs, 0  8  Sensory (PP face, arm, trunk, leg) Normal, Mild/mod, Severe/total 0   9  Best Language (items, picture, reads) Good, Mild/mod, Severe, Mute 0   10  Dysarthria (speech clarity) Normal, Mild/mod, Severe dysarthria 0   11  Extinction/neglect None, Partial neglect, Complete 0    Total NIHSS 2   Mental Status: The patient was awake, alert, attentive, oriented to person, place, and time  Recent and remote memory intact to conversation with no evidence of language dysfunction  Satisfactory fund of knowledge  Normal attention span and concentration  Able to name, repeat, describe a complex scene  HPI: Keyla Bermudez is a right handed  62 y o  male who  is known to both the in patient as well as office practice from a stroke that he sustained in July of 2018 despite tPA administration  At that time he presented with significant right-sided weakness as well as facial droop and language dysfunction  He was given tPA and he was noted to have improvement of his symptoms at that time  He did still have though a MRI positive left basal ganglia infarct noted  He was discharged on [de-identified] of Lipitor an 81 mg of aspirin to rehab  He was subsequently discharged from rehab and he was then seen in the office in mid September  He apparently was doing well at that time    He did however happened to also have after the office visit a short admission again here at [facility] for period of hypoglycemia associated with his diabetes  He was again discharged  Of today he was in [place] with his family apparently during a service at 10:30 a m  or so the patient's cousin who was a nurse was called to see the patient as he reported that he did not feel well  Was noted to be markedly diaphoretic and unable to stand  He was some assisted with eating a Coke as well as some cookies and after he had approximately half to 3/4 of a can of Coke they report that he looked somewhat better and that he was able to help standing get to the car  Presented here to the emergency department approximately half an hour after that and a stroke alert was called as he was noted to have significant right-sided weakness without a facial droop but with language dysfunction  He was taken to CT scan and upon his return from CT scan he was noted to have resolution of his symptoms and he was moving his right side again and his speech and language were intact  I arrived in the emergency department approximately 1-2 minutes after his return from the CT scan, in his CTA, and he was moving and speaking in the emergency department  His NIH score above of 2, was a result of a very minor weakness of his right arm and leg  His son was the primary informant, the patient largely stays passive until address directly  ROS: 12 system cued query: At this time he denies any headache completely  No visual disturbances or sensitivities  He denies any new acute numbness or tingling and his son is quick to report that he has neuropathy  He denies any shortness of breath or difficulty breathing  No chest pain no palpitations by his report  He reports that he feels well again and denies any complaints    His son and his daughter both at the bedside feel that his speech is almost his usual but that it gets lazy" at times particularly when he is tired or his sugar is low or if he does not feel like it   The patient does not endorse any extremity weakness at this time  No bladder or bowel complaints, although he has a bowel stimulator  The remainder of his query is negative  Historical Information     Past Medical History:   Diagnosis Date    Diabetes mellitus ([facility] [address] )     Hypercholesteremia     Hyperlipidemia     Hypertension     Neuropathy     Obesity     Osteomyelitis ([facility] [address] )     last assessed 11/4/16    PVC's (premature ventricular contractions)     sees cardiology Dr Mariano camargo    Stroke [facility])     last weeof July 2018 [address] [place] [address]     Past Surgical History:   Procedure Laterality Date    ABDOMINAL SURGERY      CHOLECYSTECTOMY      Percutaneous    OTHER SURGICAL HISTORY      "stimulator to control bowel movements"    PA ESOPHAGOGASTRODUODENOSCOPY TRANSORAL DIAGNOSTIC N/A 9/27/2016    Procedure: ESOPHAGOGASTRODUODENOSCOPY (EGD); Surgeon: Angy Harris MD;  [facility]; Service: Gastroenterology    PA LAP,CHOLECYSTECTOMY N/A 2/29/2016    Procedure: LAPAROSCOPIC CHOLECYSTECTOMY ;  Surgeon: Paul Og DO;  [facility];  Service: General    ROTATOR CUFF REPAIR Right     TOE AMPUTATION Right 10/28/2016    Procedure: 3RD TOE AMPUTATION ;  Surgeon: Marian Emmanuel DPM;  [facility];  Service:        Social History  he is  lives with his adult children  He has never been a smoker no alcohol no recreational is  Family History:   Family History   Problem Relation Age of Onset    Leukemia Mother     Liver disease Mother     Lung cancer Mother         heavy smoker - 3 ppd    Heart disease Father     Liver disease Father     Multiple myeloma Sister     Breast cancer Sister     Urolithiasis Family     Alcohol abuse Neg Hx     Depression Neg Hx     Drug abuse Neg Hx     Substance Abuse Neg Hx          No Known Allergies  Meds:all current active meds have been reviewed and He is compliant with his medications      Scheduled Meds:  Current Facility-Administered Medications:  sodium chloride 1,000 mL Intravenous Once Jean Marie AJMES Bev  Last Rate: 1,000 mL (10/28/18 1145)     PRN Meds:       Physical Exam:   Objective   Vitals:Blood pressure 154/77, pulse 58, temperature 98 2 °F (36 8 °C), temperature source Oral, resp  rate 15, weight 106 kg (233 lb 11 oz), SpO2 96 %  ,Body mass index is 33 53 kg/m²  Patient was examined in emergency department bed his family is present at the bedside  General: alert now, somewhat overweight, appears stated age and cooperative  Head: Normocephalic, without obvious abnormality, atraumatic  Oral exam: lips, mucosa, and tongue moist;   Neck: no carotid bruit,   Lungs: clear to auscultation ant  bilaterally  Heart: regular rate and rhythm, S1, S2 normal, no murmur appreciated,   Abdomen: soft, +BS    Extremities: atraumatic, no cyanosis or edema    Neurologic:   Mental status: Alert, attentive to his family in the room  He is  oriented generally reports that it is Halloween any picks 2018  His family report that he has a spotty memory  He is able to follow all commands includes his 2nd language, he is able to follow most commands in [place]  A few commands were translated by his family at the bedside for clarification  His thought content appropriate in regards to his concern that we were looking at a stroke possibility he reported "again?"  CN Exam: GIBRAN, EOM's I, VF full, Gaze conjugate No sensory lateralizations w/out PP on face, he had symmetrical and complete facial movements without lateralization  Hearing I B, CNIX-XII I B, he is a low palate  Motor:  Nearly symmetrical power, age appropriate x 4 limbs, with just a slight half grade weakness in his right arm and leg compared with the left  However with cuing and motivation he can give a will more complete effort on the right    Sensory:  Grossly intact  X 4 limbs, 4 mod inc lt, temp, vib, which was transient and prop which was somewhat impaired, (not PP tested at this time)  Cerebellar: no past pointing with a very slight drift of the right arm from modified recumbent position, no ataxia w maneuvers, Fine motor & finger taps, age appropriate, WNL  DTR's:  +1 Age appropriate, WNL; Plantars:  Mute bilaterally  Gait:  He was not gaited at this time  Lab Results:   I have personally reviewed pertinent reports  , CBC:   Results from last 7 days  Lab Units 10/28/18  1112   WBC Thousand/uL 8 56   RBC Million/uL 4 89   HEMOGLOBIN g/dL 13 9   I STAT HEMOGLOBIN g/dl 13 9   HEMATOCRIT % 42 5  41   MCV fL 87   PLATELETS Thousands/uL 175   , BMP/CMP:   Results from last 7 days  Lab Units 10/28/18  1112   SODIUM mmol/L 139   POTASSIUM mmol/L 4 0   CHLORIDE mmol/L 106   CO2 mmol/L 23   BUN mg/dL 32*   CREATININE mg/dL 2 32*   GLUCOSE, ISTAT mg/dl 91   CALCIUM mg/dL 9 6   EGFR ml/min/1 73sq m 32  30   , Vitamin B12:   , HgBA1C:   , TSH:   , Coagulation:   Results from last 7 days  Lab Units 10/28/18  1112   INR  1 02   , Lipid Profile:        Imaging Studies: I have personally reviewed pertinent films in PACS and In reviewing his MRI from 3 months ago he had the residual ischemic infarct in the left basal ganglia region despite the tPA  His CT at this time notes the hypodensity from that infarct  His CTA: now Cerebral atrophy with chronic small vessel ischemic white matter disease and chronic left basal ganglia lacunar infarction  2   Moderate atherosclerotic disease with moderate stenosis of the cavernous right internal carotid artery and severe stenosis of the intracranial left vertebral artery  No evidence of aneurysm, vascular malformation or vascular cut off  3   Truncation of the terminal left middle cerebral artery territory branches  No large vessel occlusion  4   Moderate stenosis of the proximal P1 segment of the left posterior cerebral artery         CTA August of 2018 Severe focal stenosis distal right petrous/cavernous ICA junction  Moderate focal right supraclinoid ICA stenosis    No large vessel occlusion  No intracranial aneurysm identified  Mild ostial stenosis bilateral vertebral arteries  Moderate focal intracranial left vertebral stenosis  Mild left atherosclerotic calcification right carotid bifurcation      EEG, Echo, Pathology, and Other Studies: His echo was recently done 3 months ago he was noted to have an ejection fraction of 55% without regional wall motion abnormality and his atria were noted to be within normal limits bilaterally  He also had EEG testing done in July abnormal because of:  Mild paucity of faster activities on the left as well as intermittent left temporal polymorphic delta slowing suggests relative cerebral dysfunction on the left, maximal over the left temporal region  Less prominent intermittent right temporal slowing suggests additional regions of underlying focal cerebral dysfunction  A single left temporal suspicious sharp transient raises the possibility of underlying epileptogenic potential, but is not a definite indication of increased seizure risk  Counseling / Coordination of Care  Total Critical Care time spent 55 minutes excluding procedures, teaching and family updates  Dictation voice to text software has been used in the creation of this document  Please consider this in light of any contextual or grammatical errors

## 2018-10-28 NOTE — ASSESSMENT & PLAN NOTE
Lab Results   Component Value Date    HGBA1C 7 5 (H) 07/28/2018       Recent Labs      10/28/18   1100   POCGLU  75       Blood Sugar Average: Last 72 hrs:  (P) 75   · Last hemoglobin A1c from July indicate fair control of DM  · Patient has had 4 ED visits/hospitalizations for hypoglycemia and has had frequent insulin dose adjustments 2/2 hypoglycemia  · Patient's wife prepares his meals for him-- this AM for breakfast he had eggs and 2 pieces of toast and 2 cups of coffee with milk and sweet & low-- this is a normal breakfast for him   · Will repeat hemoglobin A1c as it has been 3 months since last test  · Decrease AM Lantus by 2 units (33U from 35) and decreased mealtime requirements by 1U (5U from 6U)  · QID BS checks  · Nutrition consult placed  · Endocrinology consult placed as well-- appreciate their recommendations

## 2018-10-28 NOTE — ASSESSMENT & PLAN NOTE
Lab Results   Component Value Date    HGBA1C 7 5 (H) 07/28/2018    His family report that he has had episodes of similar hypoglycemia in the past   He has been a diabetic for approximately 10-15 years

## 2018-10-28 NOTE — ASSESSMENT & PLAN NOTE
The onset today of his right-sided symptoms, for which a stroke alert was called, seems to have had as the preceding event a period of hypoglycemia  Currently his symptoms have all resolved and there is no significant lateralization to his exam   TPA was held at this time given the improvement in his symptoms  He will be admitted though and worked up overnight with appropriate medical changes deemed necessary as his workup proceeds  He has a December 26th appointment with Dr Hector Verdugo of our stroke team in the office I have encouraged the patient his family keep this appointment

## 2018-10-28 NOTE — H&P
H&P- Silvana Spearing 1960, 62 y o  male MRN: [id number]    Unit/Bed#: -01 Encounter: [id number]    Primary Care Provider: Miladis Wu DO   Date and time admitted to hospital: 10/28/2018 10:58 AM    * Stroke-like symptoms, with right-sided weakness   Assessment & Plan    · POA, now resolved  Family reports diaphoresis and global weakness while at [employer]   Upon arrival to ED patient's R side was flaccid--- this lasted a short period of time and then patient's resolved to his baseline  · CT head/CTA in ED do not reveal acute/large changes compared to prior studies  · Discussed with neurology: DDx including new infarct (questionably related to embolic dz), seizure, TIA, etc  Question role of hypoglycemia  · Admit  · Telemetry  · Echo/MRI/EEG  · Neurochecks  · Continue aspirin/statin-- if MRI negative, will plavix load and maintain dual anti-platelet therapy V24 days     Type 2 diabetes mellitus with hypoglycemia, with long-term current use of insulin [facility])   Assessment & Plan    Lab Results   Component Value Date    HGBA1C 7 5 (H) 07/28/2018       Recent Labs      10/28/18   1100   POCGLU  75       Blood Sugar Average: Last 72 hrs:  (P) 75   · Last hemoglobin A1c from July indicate fair control of DM  · Patient has had 4 ED visits/hospitalizations for hypoglycemia and has had frequent insulin dose adjustments 2/2 hypoglycemia  · Patient's wife prepares his meals for him-- this AM for breakfast he had eggs and 2 pieces of toast and 2 cups of coffee with milk and sweet & low-- this is a normal breakfast for him   · Will repeat hemoglobin A1c as it has been 3 months since last test  · Decrease AM Lantus by 2 units (33U from 35) and decreased mealtime requirements by 1U (5U from 6U)  · QID BS checks  · Nutrition consult placed  · Endocrinology consult placed as well-- appreciate their recommendations     Benign hypertension with CKD (chronic kidney disease) stage III (HCC)   Assessment & Plan    · BP above goal upon admission  · Permissive hypertension  · Continue home BP meds  · Renal function appears to be around baseline-- per review of prior records, Cr has been between 2 2-2 6-- today 2 3  · Continue to monitor, avoid hypotension, repeat BMP in AM     Cerebrovascular accident (CVA) due to thrombosis of left middle cerebral artery ([facility] )   Assessment & Plan    · Occurred 3 months ago  · Patient with R sided deficits-- UE and LE weakness, wide-based gait, speech difficulties  · Follows with neurology as an outpatient  · Patient attends PT/OT/speech therapy as an outpatient      Diabetic polyneuropathy associated with type 2 diabetes mellitus ([facility] )   Assessment & Plan    Lab Results   Component Value Date    HGBA1C 7 5 (H) 07/28/2018       Recent Labs      10/28/18   1100  10/28/18   1631   POCGLU  75  287*       Blood Sugar Average: Last 72 hrs:  (P) 181   · With diabetic ulcer on ball of L foot-- no surrounding cellulitis and drainage is sero-sang  · Continue local wound care  · Follows podiatry as an outpatient      Mixed hyperlipidemia   Assessment & Plan    · Continue high-dose statin  · Repeat lipid panel pending     Other constipation   Assessment & Plan    · Patient has nerve stimulator-- currently turned OFF  · Continue stool softeners/laxatives         VTE Prophylaxis: Heparin  / sequential compression device   Code Status: Level 1--Full Code   POLST: There is no POLST form on file for this patient (pre-hospital)  Discussion with family: wife, son at bedside     Anticipated Length of Stay:  Patient will be admitted on an Inpatient basis with an anticipated length of stay of  > 2 midnights  Justification for Hospital Stay: CVA work up    Total Time for Visit, including Counseling / Coordination of Care: 30 minutes  Greater than 50% of this total time spent on direct patient counseling and coordination of care  Chief Complaint:   R sided weakness    History of Present Illness:    Joce Zack is a 62 y o  male with past medical history significant for recent left-sided CVA with right-sided deficits, hypertension, hyperlipidemia, type 2 diabetes on insulin with hypoglycemia presents to the ED for evaluation of altered mental status and right-sided weakness and he  Family reports that patient was at [employer] when he started to have diaphoresis and was generally weak  The nurse at the patient discharged thought he was hypoglycemic, and give the patient some Coke in the donut  Patient did not immediately improved after drinking soda and having the donate  EMS was called  By the time the patient arrived in the ED, he was flaccid on the right hand side  Patient went to CT scan as a stroke alert and when he came out of CT scan his symptoms were beginning to resolve  He has no chest pain no shortness of breath  No nausea no vomiting  Yet family reports that he is still having some intermittent hypoglycemia despite multiple medication adjusted by her PCP  Review of Systems:    Review of Systems   Constitutional: Positive for diaphoresis  HENT: Negative  Eyes: Negative  Respiratory: Negative  Cardiovascular: Negative  Gastrointestinal: Positive for constipation  Genitourinary: Negative  Musculoskeletal: Positive for gait problem  Skin: Positive for wound  Neurological: Positive for weakness  Hematological: Negative  Psychiatric/Behavioral: Negative          Past Medical and Surgical History:     Past Medical History:   Diagnosis Date    Diabetes mellitus ([facility] )     Hypercholesteremia     Hyperlipidemia     Hypertension     Neuropathy     Obesity     Osteomyelitis ([facility] )     last assessed 11/4/16    PVC's (premature ventricular contractions)     sees cardiology Dr Ivet camargo    Stroke [facility])     last weeof July 2018 [address] [facility]       Past Surgical History:   Procedure Laterality Date    ABDOMINAL SURGERY      CHOLECYSTECTOMY      Percutaneous    OTHER SURGICAL HISTORY      "stimulator to control bowel movements"    [place] ESOPHAGOGASTRODUODENOSCOPY TRANSORAL DIAGNOSTIC N/A 9/27/2016    Procedure: ESOPHAGOGASTRODUODENOSCOPY (EGD); Surgeon: Bipin Velasquez MD;  Location: [facility]; Service: Gastroenterology    [place] LAP,CHOLECYSTECTOMY N/A 2/29/2016    Procedure: LAPAROSCOPIC CHOLECYSTECTOMY ;  Surgeon: Karen Quezada DO;  Location: [facility];  Service: General    ROTATOR CUFF REPAIR Right     TOE AMPUTATION Right 10/28/2016    Procedure: 3RD TOE AMPUTATION ;  Surgeon: Lisa Gold DPM;  Location[facility];  Service:        Meds/Allergies:    Prior to Admission medications    Medication Sig Start Date End Date Taking? Authorizing Provider   ACCU-CHEK SOFTCLIX LANCETS lancets by Other route 4 (four) times a day 10/26/18   Raj Auguste DO   amLODIPine (NORVASC) 5 mg tablet Take 1 tablet (5 mg total) by mouth every 12 (twelve) hours 10/15/18   Raj Auguste DO   aspirin (ECOTRIN LOW STRENGTH) 81 mg EC tablet Take 81 mg by mouth daily Resume on 8/14    Historical Provider, MD   atorvastatin (LIPITOR) 80 mg tablet Take 1 tablet (80 mg total) by mouth daily with dinner 9/10/18   Aracely Louis DO   B-D INS SYRINGE 0 5CC/30GX1/2" 30G X 1/2" 0 5 ML MISC Inject under the skin 4 (four) times a day 10/15/18   Raj Auguste DO   Blood Glucose Monitoring Suppl (ONE TOUCH ULTRA 2) w/Device KIT by Does not apply route 3 (three) times a day 8/14/18   Raj Auguste DO   Blood Pressure Monitoring (BLOOD PRESSURE CUFF) MISC Use to check blood pressure before taking blood pressure medication and 1 hour after and follow instructions provided in discharge instructions based on the readings   8/13/18   Elvia Byrd MD   carvedilol (COREG) 6 25 mg tablet Take 1 tablet (6 25 mg total) by mouth 2 (two) times a day with meals 10/15/18   Raj Auguste DO   Cholecalciferol (VITAMIN D3) 51952 units CAPS Take 50,000 Units by mouth once a week 9/24/18   Historical Provider, MD   cholecalciferol 46710 units TABS Take 1 tablet (50,000 Units total) by mouth once a week 9/24/18   BRITTANY Ortega   cyanocobalamin 1000 MCG tablet Take 1 tablet (1,000 mcg total) by mouth daily 9/24/18   BRITTANY Ortega   diphenoxylate-atropine (LOMOTIL) 2 5-0 025 mg per tablet Take 1 tablet by mouth 4 (four) times a day as needed for diarrhea    Historical Provider, MD   escitalopram (LEXAPRO) 5 mg tablet Take 1 tablet (5 mg total) by mouth daily 10/26/18   Raj Auguste DO   famotidine (PEPCID) 20 mg tablet Take 20 mg by mouth daily Resume on 8/14    Historical Provider, MD   gabapentin (NEURONTIN) 250 mg/5 mL solution Take 12 mL (600 mg total) by mouth daily at bedtime 8/24/18   Braeden Hernandez DO   glucose 4 g chewable tablet Chew 3 tablets (12 g total) as needed for low blood sugar 8/15/18   Raj Auguste DO   glucose blood (ACCU-CHEK POWER) test strip 1 each by Other route 4 (four) times a day 10/26/18   Raj Auguste DO   Incontinence Supplies (MALE URINAL) MISC by Does not apply route daily 9/24/18   Raj Auguste DO   insulin aspart (NovoLOG) 100 units/mL injection Inject 4 Units under the skin 3 (three) times a day before meals 10/12/18   Raj Auguste DO   insulin glargine (LANTUS) 100 units/mL subcutaneous injection Inject 35 Units under the skin daily 10/12/18   Raj Auguste DO   Insulin Syringe-Needle U-100 (B-D INS SYR ULTRAFINE  3CC/30G) 30G X 1/2" 0 3 ML MISC by Does not apply route 4 (four) times a day 10/26/18   Raj Auguste DO   lactulose 20 g/30 mL Take 30 mL (20 g total) by mouth daily as needed (constipation) 8/17/18   Raj Auguste DO   neomycin-bacitracin-polymyxin b (NEOSPORIN) ointment Apply 1 application topically 2 (two) times a day Apply twice per day to shin wound until fully healed  If not fully healed in 5 days contact your family doctor   Next application is the evening of 8/13    Historical Provider, MD   ondansetron (ZOFRAN-ODT) 4 mg disintegrating tablet Take 1 tablet (4 mg total) by mouth every 6 (six) hours as needed for nausea or vomiting 10/6/18   Thayer Hamman, DO     I have reviewed home medications with patient family member  Allergies: No Known Allergies    Social History:     Marital Status: /Civil Union   Occupation: retired  Patient Pre-hospital Living Situation: home with wife  Patient Pre-hospital Level of Mobility: independent  Patient Pre-hospital Diet Restrictions: diabetic prudent   Substance Use History:   History   Alcohol Use No     History   Smoking Status    Never Smoker   Smokeless Tobacco    Never Used     History   Drug Use No       Family History:    non-contributory    Physical Exam:     Vitals:   Blood Pressure: (!) 176/100 (10/28/18 1535)  Pulse: 73 (10/28/18 1535)  Temperature: 98 2 °F (36 8 °C) (10/28/18 1535)  Temp Source: Oral (10/28/18 1535)  Respirations: 18 (10/28/18 1535)  Height: 5' 8" (172 7 cm) (10/28/18 1535)  Weight - Scale: 106 kg (233 lb 11 oz) (10/28/18 1535)  SpO2: 97 % (10/28/18 1535)    Physical Exam   Constitutional: He is oriented to person, place, and time  No distress  HENT:   Head: Normocephalic and atraumatic  Mouth/Throat: No oropharyngeal exudate  Eyes: Pupils are equal, round, and reactive to light  Conjunctivae and EOM are normal  No scleral icterus  Neck: No JVD present  Cardiovascular: Normal rate and regular rhythm  Exam reveals no gallop and no friction rub  No murmur heard  Pulmonary/Chest: Effort normal and breath sounds normal  No respiratory distress  He has no wheezes  He has no rales  Abdominal: Soft  Bowel sounds are normal  He exhibits no distension  There is no tenderness  There is no rebound  Musculoskeletal: He exhibits no edema or tenderness  Neurological: He is alert and oriented to person, place, and time  He displays normal reflexes  No cranial nerve deficit  He exhibits abnormal muscle tone (slight weakness RUE/RLE compared to LUE/LLE consistent with deficits from prior stroke)     Speech is slightly slurred-- known deficit    Skin: Skin is warm and dry  He is not diaphoretic  Evidence of non-healing wound on ball of L foot-- surrounding skin is non-cellulitic appearing-- drainage is serosang   Psychiatric: He has a normal mood and affect  His behavior is normal        Additional Data:     Lab Results: I have personally reviewed pertinent reports  Results from last 7 days  Lab Units 10/28/18  1112   WBC Thousand/uL 8 56   HEMOGLOBIN g/dL 13 9   I STAT HEMOGLOBIN g/dl 13 9   HEMATOCRIT % 42 5  41   PLATELETS Thousands/uL 175       Results from last 7 days  Lab Units 10/28/18  1112   SODIUM mmol/L 139   POTASSIUM mmol/L 4 0   CHLORIDE mmol/L 106   CO2 mmol/L 23   BUN mg/dL 32*   CREATININE mg/dL 2 32*   ANION GAP ISTAT mmol/L 14*   ANION GAP mmol/L 10   CALCIUM mg/dL 9 6   GLUCOSE, ISTAT mg/dl 91       Results from last 7 days  Lab Units 10/28/18  1112   INR  1 02       Results from last 7 days  Lab Units 10/28/18  1631 10/28/18  1100   POC GLUCOSE mg/dl 287* 75               Imaging: I have personally reviewed pertinent reports  CTA stroke alert (head/neck)   Final Result by Daniel Mcelroy DO (10/28 3997)   1  Cerebral atrophy with chronic small vessel ischemic white matter disease and chronic left basal ganglia lacunar infarction  2   Moderate atherosclerotic disease with moderate stenosis of the cavernous right internal carotid artery and severe stenosis of the intracranial left vertebral artery  No evidence of aneurysm, vascular malformation or vascular cut off  3   Truncation of the terminal left middle cerebral artery territory branches  No large vessel occlusion  4   Moderate stenosis of the proximal P1 segment of the left posterior cerebral artery               I personally discussed this study with Katie Pereira on 10/28/2018 at 11:36 AM                 Workstation performed: [id number]         CT stroke alert brain   Final Result by Daniel Mcelroy DO (10/28 5761) Cerebral atrophy with chronic small vessel ischemic white matter disease and chronic lacunar infarctions as described above  Findings were directly discussed with Roseline Mckinney on 10/28/2018 11:19 AM       Workstation performed: [id number]         XR stroke alert portable chest    (Results Pending)   MRI inpatient order    (Results Pending)       EKG, Pathology, and Other Studies Reviewed on Admission:   · EKG: NSR     Allscripts / Epic Records Reviewed: Yes     ** Please Note: This note has been constructed using a voice recognition system   **

## 2018-10-28 NOTE — ED PROVIDER NOTES
History  Chief Complaint   Patient presents with   [address]     Pt  became unresponsive and weak in [facility], pt difficulty speaking  Pt is flaccid on right side  49-year-old male, as per family was at [facility], began feeling very weak , thought his blood sugar was low was given toast and a drink to increased sugar , became increasingly weak, had difficulty talking difficulty walking stay put him in the car and brought him here  Upon arrival patient was extremely diaphoretic and unable to get other car had to be removed from the car by nursing staff  At my time of involvement patient was in his current room, unable to speak unable to use the right side of his body  So a stroke alert was called        History provided by:  Patient  STROKE Alert   Location:  Right-sided  Quality:  Unable to move  Severity:  Severe  Onset quality:  Sudden  Duration:  45 minutes  Timing:  Constant  Progression:  Unchanged  Chronicity:  New  Context:  Sudden onset right-sided weakness and difficulty speaking  , patient had to be pulled out of car by nursing staff  Relieved by:  Nothing  Worsened by:  None tried      Prior to Admission Medications   Prescriptions Last Dose Informant Patient Reported? Taking? ACCU-CHEK SOFTCLIX LANCETS lancets   No No   Sig: by Other route 4 (four) times a day   B-D INS SYRINGE 0 5CC/30GX1/2" 30G X 1/2" 0 5 ML MISC  Spouse/Significant Other No No   Sig: Inject under the skin 4 (four) times a day   Blood Glucose Monitoring Suppl (ONE TOUCH ULTRA 2) w/Device KIT  Spouse/Significant Other No No   Sig: by Does not apply route 3 (three) times a day   Blood Pressure Monitoring (BLOOD PRESSURE CUFF) MISC  Spouse/Significant Other No No   Sig: Use to check blood pressure before taking blood pressure medication and 1 hour after and follow instructions provided in discharge instructions based on the readings     Cholecalciferol (VITAMIN D3) 99780 units CAPS  Spouse/Significant Other Yes No   Sig: Take 50,000 Units by mouth once a week   Incontinence Supplies (MALE URINAL) MISC  Spouse/Significant Other No No   Sig: by Does not apply route daily   Insulin Syringe-Needle U-100 (B-D INS SYR ULTRAFINE  3CC/30G) 30G X 1/2" 0 3 ML MISC   No No   Sig: by Does not apply route 4 (four) times a day   amLODIPine (NORVASC) 5 mg tablet  Spouse/Significant Other No No   Sig: Take 1 tablet (5 mg total) by mouth every 12 (twelve) hours   aspirin (ECOTRIN LOW STRENGTH) 81 mg EC tablet  Spouse/Significant Other Yes No   Sig: Take 81 mg by mouth daily Resume on    atorvastatin (LIPITOR) 80 mg tablet  Spouse/Significant Other No No   Sig: Take 1 tablet (80 mg total) by mouth daily with dinner   carvedilol (COREG) 6 25 mg tablet  Spouse/Significant Other No No   Sig: Take 1 tablet (6 25 mg total) by mouth 2 (two) times a day with meals   cholecalciferol 69664 units TABS  Spouse/Significant Other No No   Sig: Take 1 tablet (50,000 Units total) by mouth once a week   cyanocobalamin 1000 MCG tablet  Spouse/Significant Other No No   Sig: Take 1 tablet (1,000 mcg total) by mouth daily   diphenoxylate-atropine (LOMOTIL) 2 5-0 025 mg per tablet  Spouse/Significant Other Yes No   Sig: Take 1 tablet by mouth 4 (four) times a day as needed for diarrhea   escitalopram (LEXAPRO) 5 mg tablet   No No   Sig: Take 1 tablet (5 mg total) by mouth daily   famotidine (PEPCID) 20 mg tablet  Spouse/Significant Other Yes No   Sig: Take 20 mg by mouth daily Resume on    gabapentin (NEURONTIN) 250 mg/5 mL solution  Spouse/Significant Other No No   Sig: Take 12 mL (600 mg total) by mouth daily at bedtime   glucose 4 g chewable tablet  Spouse/Significant Other No No   Sig: Chew 3 tablets (12 g total) as needed for low blood sugar   glucose blood (ACCU-CHEK POWER) test strip   No No   Si each by Other route 4 (four) times a day   insulin aspart (NovoLOG) 100 units/mL injection  Spouse/Significant Other No No   Sig: Inject 4 Units under the skin 3 (three) times a day before meals   insulin glargine (LANTUS) 100 units/mL subcutaneous injection  Spouse/Significant Other No No   Sig: Inject 35 Units under the skin daily   lactulose 20 g/30 mL  Spouse/Significant Other No No   Sig: Take 30 mL (20 g total) by mouth daily as needed (constipation)   neomycin-bacitracin-polymyxin b (NEOSPORIN) ointment  Spouse/Significant Other Yes No   Sig: Apply 1 application topically 2 (two) times a day Apply twice per day to shin wound until fully healed  If not fully healed in 5 days contact your family doctor  Next application is the evening of 8/13   ondansetron (ZOFRAN-ODT) 4 mg disintegrating tablet  Spouse/Significant Other No No   Sig: Take 1 tablet (4 mg total) by mouth every 6 (six) hours as needed for nausea or vomiting      Facility-Administered Medications: None       Past Medical History:   Diagnosis Date    Diabetes mellitus ([facility] )     Hypercholesteremia     Hyperlipidemia     Hypertension     Neuropathy     Obesity     [facility]     last assessed 11/4/16    PVC's (premature ventricular contractions)     sees cardiology Dr Niraj camargo    [facility])     last weeof July 2018 [address] [facility]       Past Surgical History:   Procedure Laterality Date    ABDOMINAL SURGERY      CHOLECYSTECTOMY      Percutaneous    OTHER SURGICAL HISTORY      "stimulator to control bowel movements"    [place] ESOPHAGOGASTRODUODENOSCOPY TRANSORAL DIAGNOSTIC N/A 9/27/2016    Procedure: ESOPHAGOGASTRODUODENOSCOPY (EGD); Surgeon: Rebeka Bourgeois MD;  [facility]: [facility];   Service: Gastroenterology    [place] LAP,CHOLECYSTECTOMY N/A 2/29/2016    Procedure: LAPAROSCOPIC CHOLECYSTECTOMY ;  Surgeon: Dayanna Frausto DO;  Location: [facility];  Service: General    ROTATOR CUFF REPAIR Right     TOE AMPUTATION Right 10/28/2016    Procedure: 3RD TOE AMPUTATION ;  Surgeon: Johanny Israel DPM;  [facility];  Service:        Family History   Problem Relation Age of Onset    Leukemia Mother     Liver disease Mother     Lung cancer Mother         heavy smoker - 3 ppd    Heart disease Father     Liver disease Father     Multiple myeloma Sister     Breast cancer Sister     Urolithiasis Family     Alcohol abuse Neg Hx     Depression Neg Hx     Drug abuse Neg Hx     Substance Abuse Neg Hx      I have reviewed and agree with the history as documented  Social History   Substance Use Topics    Smoking status: Never Smoker    Smokeless tobacco: Never Used    Alcohol use No        Review of Systems   Unable to perform ROS: Acuity of condition       Physical Exam  Physical Exam   Constitutional: He is oriented to person, place, and time  He appears well-developed  He appears distressed  HENT:   Head: Normocephalic and atraumatic  Eyes: Pupils are equal, round, and reactive to light  Neck: Normal range of motion  Neck supple  No tracheal deviation present  Cardiovascular: Normal rate, regular rhythm, normal heart sounds and intact distal pulses  No murmur heard  Pulmonary/Chest: Effort normal and breath sounds normal  No stridor  No respiratory distress  Abdominal: Soft  He exhibits no distension  There is no tenderness  There is no rebound and no guarding  Musculoskeletal: Normal range of motion  Neurological: He is alert and oriented to person, place, and time  A cranial nerve deficit and sensory deficit is present  He displays no seizure activity  GCS eye subscore is 4  GCS verbal subscore is 1  GCS motor subscore is 6  Patient with complete right-sided flaccid paralysis  No movement at all on this right side  This involved the right upper extremity and the right lower extremity  Patient had a minor right-sided facial droop  Patient had a severe expressive aphasia  But was able to follow come I commands with the left side of his body  Skin: Skin is warm and dry  He is not diaphoretic  No erythema  No pallor     Psychiatric: He has a normal mood and affect  Vitals reviewed  Vital Signs  ED Triage Vitals   Temperature Pulse Respirations Blood Pressure SpO2   10/28/18 1212 10/28/18 1104 10/28/18 1104 10/28/18 1104 10/28/18 1104   98 2 °F (36 8 °C) 58 15 154/77 96 %      Temp Source Heart Rate Source Patient Position - Orthostatic VS BP Location FiO2 (%)   10/28/18 1212 10/28/18 1104 -- 10/28/18 1104 --   Oral Monitor  Left arm       Pain Score       --                  Vitals:    10/28/18 1104   BP: 154/77   Pulse: 58       Visual Acuity      ED Medications  Medications   sodium chloride 0 9 % bolus 1,000 mL (1,000 mL Intravenous New Bag 10/28/18 1145)   aspirin tablet 325 mg (not administered)   iodixanol (VISIPAQUE) 320 MG/ML injection 85 mL (85 mL Intravenous Given 10/28/18 1140)       Diagnostic Studies  Results Reviewed     Procedure Component Value Units Date/Time    Protime-INR [[id number]]  (Normal) Collected:  10/28/18 1112    Lab Status:  Final result Specimen:  Blood Updated:  10/28/18 1126     Protime 13 1 seconds      INR 1 02    APTT [[id number]]  (Normal) Collected:  10/28/18 1112    Lab Status:  Final result Specimen:  Blood Updated:  10/28/18 1126     PTT 36 seconds     Basic metabolic panel [[id number]]  (Abnormal) Collected:  10/28/18 1112    Lab Status:  Final result Specimen:  Blood Updated:  10/28/18 1125     Sodium 139 mmol/L      Potassium 4 0 mmol/L      Chloride 106 mmol/L      CO2 23 mmol/L      ANION GAP 10 mmol/L      BUN 32 (H) mg/dL      Creatinine 2 32 (H) mg/dL      Glucose 94 mg/dL      Calcium 9 6 mg/dL      eGFR 30 ml/min/1 73sq m     Narrative:         National Kidney Disease Education Program recommendations are as follows:  GFR calculation is accurate only with a steady state creatinine  Chronic Kidney disease less than 60 ml/min/1 73 sq  meters  Kidney failure less than 15 ml/min/1 73 sq  meters      POCT troponin [[id number]] Collected:  10/28/18 1111    Lab Status:  Final result Updated:  10/28/18 1123 POC Troponin I 0 00 ng/ml      Specimen Type VENOUS    Narrative:         [employer] i-Stat handheld analyzer 99% cutoff is > 0 08ng/mL in network Emergency Departments    o cTnI 99% cutoff is useful only when applied to patients in the clinical setting of myocardial ischemia  o cTnI 99% cutoff should be interpreted in the context of clinical history, ECG findings and possibly cardiac imaging to establish correct diagnosis  o cTnI 99% cutoff may be suggestive but clearly not indicative of a coronary event without the clinical setting of myocardial ischemia  POCT Chem 8+ [[id number]]  (Abnormal) Collected:  10/28/18 1112    Lab Status:  Final result Updated:  10/28/18 1115     SODIUM, I-STAT 141 mmol/l      Potassium, i-STAT 4 0 mmol/L      Chloride, istat 109 (H) mmol/L      CO2, i-STAT 23 mmol/L      Anion Gap, Istat 14 (H) mmol/L      Calcium, Ionized i-STAT 1 23 mmol/L      BUN, I-STAT 36 (H) mg/dl      Creatinine, i-STAT 2 2 (H) mg/dl      eGFR 32 ml/min/1 73sq m      Glucose, i-STAT 91 mg/dl      Hct, i-STAT 41 %      Hgb, i-STAT 13 9 g/dl      Specimen Type VENOUS    CBC [[id number]]  (Normal) Collected:  10/28/18 1112    Lab Status:  Final result Specimen:  Blood Updated:  10/28/18 1115     WBC 8 56 Thousand/uL      RBC 4 89 Million/uL      Hemoglobin 13 9 g/dL      Hematocrit 42 5 %      MCV 87 fL      MCH 28 4 pg      MCHC 32 7 g/dL      RDW 13 3 %      Platelets 374 Thousands/uL      MPV 10 8 fL     Fingerstick Glucose (POCT) [[id number]]  (Normal) Collected:  10/28/18 1100    Lab Status:  Final result Updated:  10/28/18 1105     POC Glucose 75 mg/dl                  CTA stroke alert (head/neck)   Final Result by Opal Nunez DO (10/28 1148)   1  Cerebral atrophy with chronic small vessel ischemic white matter disease and chronic left basal ganglia lacunar infarction     2   Moderate atherosclerotic disease with moderate stenosis of the cavernous right internal carotid artery and severe stenosis of the intracranial left vertebral artery  No evidence of aneurysm, vascular malformation or vascular cut off  3   Truncation of the terminal left middle cerebral artery territory branches  No large vessel occlusion  4   Moderate stenosis of the proximal P1 segment of the left posterior cerebral artery  I personally discussed this study with Karey Serrano on 10/28/2018 at 11:36 AM                 Workstation performed: [id number]         CT stroke alert brain   Final Result by Сергей Rush DO (10/28 1129)   Cerebral atrophy with chronic small vessel ischemic white matter disease and chronic lacunar infarctions as described above  Findings were directly discussed with Karey Serrano on 10/28/2018 11:19 AM       Workstation performed: [id number]         XR stroke alert portable chest    (Results Pending)              Procedures  ECG 12 Lead Documentation  Date/Time: 10/28/2018 11:10 AM  Performed by: Nisha Tran by: Alan Pizano     ECG reviewed by me, the ED Provider: yes    Patient location:  ED  Previous ECG:     Previous ECG:  Compared to current    Comparison ECG info:  10 6 2018    Similarity:  No change  Interpretation:     Interpretation: non-specific    Rate:     ECG rate:  59    ECG rate assessment: bradycardic    Rhythm:     Rhythm: sinus rhythm    Ectopy:     Ectopy: none    QRS:     QRS axis:  Normal    QRS intervals:  Normal  Conduction:     Conduction: abnormal      Abnormal conduction: non-specific intraventricular conduction delay    ST segments:     ST segments:  Non-specific  T waves:     T waves: non-specific             Phone Contacts  ED Phone Contact    ED Course  ED Course as of Oct 28 1217   Sun Oct 28, 2018   1131 A very long conversation with patients wife and son , about tubing decisions  First decision a about tPA    Explained that his symptoms are very consistent with a stroke, he is well within the time window to receive tPA he does not have contraindications other than recent tPA use, he is just about exactly at the 3 month [place]  We had a risk and benefit conversation  They understand her risks benefits alternatives they understand risk of hemorrhage they understand risk of allergic reaction, and understand risk of failure to improve  Decision was made to give tPA through this shared decision making  Process  The 2nd major decision that we discussed was CTA versus no CTA  I explained that through my conversations with the neurologist, CTA was strongly recommended due to chance as this can severely   I explained that he is at extremely high risk for permanent kidney injury/failure as his GFR is only 30  I explained to them clearly that his kidneys right now are not working very well and this will likely permanently injure his kidneys and does set him up for the chance of permanent dialysis  But as this is in the setting of acute stroke and CTA is being strongly recommended by the neurologist that this is a risk versus benefit ratio  Family understands this and understands that they want to do everything we possibly can to try to have him regain his independence as he did previously and this for them includes regaining use of his right leg right arm and speaking  In the past tPA worked very well for him and they understand that if he does require thrombectomy, therefore again through shared decision making and a risk benefit conversation they agree to proceed with a CTA despite known risk and likely risk of permanent kidney injury  Discussed this with nursing staff who is currently mixing tPA    1149 Repeat examination after CT scan , patient now back to complete baseline as per family  I went back to patient's room and patient has 5/5 muscle strength on right upper and right lower extremity  Patient's speech is still slightly off but as per family this is baseline for him    I discussed it again with a neurologist, decision is not 2 to give tPA at this time and patient's family and the patient himself agree with this plan  Neurology currently at bedside  1204 Since tPA not given, tPA order set canceled                    Stroke Assessment     Row Name 10/28/18 1111 10/28/18 1152          NIH Stroke Scale    Interval Baseline Other (Comment)   Repeat examination after patient emergency department for almost an hour with rapid improvement of symptoms     Level of Consciousness (1a ) 1 0     LOC Questions (1b ) 2 0     LOC Commands (1c ) 0 0     Best Gaze (2 ) 0 0     Visual (3 ) 0 0     Facial Palsy (4 ) 1 0     Motor Arm, Left (5a ) 0 0     Motor Arm, Right (5b ) 4 0     Motor Leg, Left (6a ) 0 0     Motor Leg, Right (6b ) 4 0     Limb Ataxia (7 ) 2 0     Sensory (8 ) 2 0     Best Language (9 ) 3 0     Dysarthria (10 ) 2 1   This is baseline as per family     Extinction and Inattention (11 ) (Formerly Neglect) 0 0     Total 21 1               First Filed Value   TPA Decision  Patient not a TPA candidate  Patient is not a candidate options  Symptoms resolved/clearly non disabling  MDM  Number of Diagnoses or Management Options  Cerebrovascular accident (CVA), unspecified mechanism ([facility] ): new and requires workup  Stage 3 chronic kidney disease ([facility] ): minor  TIA (transient ischemic attack): new and requires workup  Diagnosis management comments:       Initial ED assessment:  62year-old male presents with right-sided flaccid paralysis and severe expressive aphasia  Sudden onset 45 minutes prior to arrival, extensive discussion with patient and neurologist after stroke alert called patient could be treated as acute stroke  Patient is a tPA candidate       Initial ED plan:   Stroke alert protocol, neurology consultation, tPA, proceed with CTA despite known chronic kidney disease after extensive risk benefit conversation with family        Final ED summary/disposition:   After evaluation and workup in the emergency department, tPA ultimately not given as patient had rapid resolution of his symptoms after returning from [place] [address]  Patient's total symptoms lasted about an hour  He had a very large deficit in a large NIH stroke scale upon initial presentation currently back to baseline , Neurology at bedside  , decision not to give tPA, will admit to [facility] under medicine service  Amount and/or Complexity of Data Reviewed  Clinical lab tests: ordered and reviewed  Tests in the radiology section of CPT®: ordered and reviewed  Decide to obtain previous medical records or to obtain history from someone other than the patient: yes  Obtain history from someone other than the patient: yes  Review and summarize past medical records: yes  Discuss the patient with other providers: yes  Independent visualization of images, tracings, or specimens: yes      The patient presented with a condition in which there was a high probability of imminent or life-threatening deterioration, and critical care services (excluding separately billable procedures) totalled 30-74 minutes          Disposition  Final diagnoses:   Cerebrovascular accident (CVA), unspecified mechanism ([facility] [address] )   TIA (transient ischemic attack)   Stage 3 [facility]     Time reflects when diagnosis was documented in both MDM as applicable and the Disposition within this note     Time User Action Codes Description Comment    10/28/2018 11:10 AM Sharyle Beverage Add [I63 9] Cerebrovascular accident (CVA), unspecified mechanism ([facility] )     10/28/2018 11:10 AM Sharyle Beverage Modify [I63 9] Cerebrovascular accident (CVA), unspecified mechanism ([facility] )     10/28/2018 11:10 AM Sharyle Beverage Modify [I63 9] Cerebrovascular accident (CVA), unspecified mechanism ([facility] )     10/28/2018 12:03 PM Nazanin JAMES Add [G45 9] TIA (transient ischemic attack)     10/28/2018 12:03 PM Sharyle Beverage Modify [I63 9] Cerebrovascular accident (CVA), unspecified mechanism ([facility] )     10/28/2018 12:03 PM Yanni Anaya Modify [G45 9] TIA (transient ischemic attack)     10/28/2018 12:14 PM Yanni Anaya Add [N18 3] Stage 3 chronic kidney disease [facility])       ED Disposition     ED Disposition Condition Comment    Admit  Case was discussed with Dr Lukas Seals and the patient's admission status was agreed to be Admission Status: inpatient status to the service of Dr Lukas Seals   Follow-up Information    None         Patient's Medications   Discharge Prescriptions    No medications on file     No discharge procedures on file      ED Provider  Electronically Signed by           Morris Bonner DO  10/28/18 4002

## 2018-10-28 NOTE — LETTER
[facility]  FLOOR MED SURG UNIT  [address] [place] [address]  Dept: [phone]    October 29, 2018     Patient: Santhosh Diaz   YOB: 1960   Date of Visit: 10/28/2018       To Whom it May Concern:    Charlene Watts is under my professional care  He was seen in the hospital from 10/28/2018   to 10/29/18  He may return to physical therapy without limititations on 10/30/18  If you have any questions or concerns, please don't hesitate to call           Sincerely,          Donato Power PA-C

## 2018-10-28 NOTE — ASSESSMENT & PLAN NOTE
Lab Results   Component Value Date    HGBA1C 7 5 (H) 07/28/2018       Recent Labs      10/28/18   1100  10/28/18   1631   POCGLU  75  287*       Blood Sugar Average: Last 72 hrs:  (P) 181   · With diabetic ulcer on ball of L foot-- no surrounding cellulitis and drainage is sero-sang  · Continue local wound care  · Follows podiatry as an outpatient

## 2018-10-28 NOTE — ED NOTES
Pt eating [employer] at this time  Patient educated on the poor quality of food such as salt and fat  Offered to get pt something more healthy  Pt is going to the floor  Will order dinner upon arrival to the floor       Josefa Moreland RN  10/28/18 0582

## 2018-10-28 NOTE — ASSESSMENT & PLAN NOTE
His stroke in July of 2018 had as it is presenting symptoms right-sided deficits with language dysfunction somewhat similar to his presentation this time     I do not see that he had any hypoglycemia at that time  After tPA he remained with a significant what appears to be a basal ganglia infarct on MRI he had improvement of his symptoms

## 2018-10-28 NOTE — ASSESSMENT & PLAN NOTE
During the workup for his July of 2018 stroke the patient is noted to have chronic small vessel disease throughout both hemispheres of a moderate distribution  He has been on aspirin previously reportedly with compliance  From His stroke 3 months ago is still noted to have some encephalomalacia on his CT scan done today  He is already on a high dose statin regimen

## 2018-10-28 NOTE — ASSESSMENT & PLAN NOTE
The patient's initial blood sugar upon presentation here at the hospital was 75  Reports of his family including an RN who was at the [facility] at the time of this event reports his symptoms as starting with significant diaphoresis and then with a global weakness  They report that they attempted to increase his blood sugar and that after having half to 3/4 of a can of Coke he improved somewhat  His  Of right-sided weakness noted here in the ED was likely a recrudescence of his left basal ganglia stroke that he sustained in July of this year

## 2018-10-28 NOTE — ASSESSMENT & PLAN NOTE
· POA, now resolved  Family reports diaphoresis and global weakness while at [employer]   Upon arrival to ED patient's R side was flaccid--- this lasted a short period of time and then patient's resolved to his baseline  · CT head/CTA in ED do not reveal acute/large changes compared to prior studies  · Discussed with neurology: DDx including new infarct (questionably related to embolic dz), seizure, TIA, etc  Question role of hypoglycemia  · Admit  · Telemetry  · Echo/MRI/EEG  · Neurochecks  · Continue aspirin/statin-- if MRI negative, will plavix load and maintain dual anti-platelet therapy Q94 days

## 2018-10-28 NOTE — ED NOTES
Upon return from CT pt has resolved of symptoms of stroke  Pt voice/speech back to normal  Pt able to express where he is  Pt has equal bilateral  and pedal strength  Pt able to lift all extremities equally  Dr Karin Gaytan made aware        Bay Garcia RN  10/28/18 0124

## 2018-10-28 NOTE — ASSESSMENT & PLAN NOTE
· BP above goal upon admission  · Permissive hypertension  · Continue home BP meds  · Renal function appears to be around baseline-- per review of prior records, Cr has been between 2 2-2 6-- today 2 3  · Continue to monitor, avoid hypotension, repeat BMP in AM

## 2018-10-29 ENCOUNTER — APPOINTMENT (OUTPATIENT)
Dept: OCCUPATIONAL THERAPY | Facility: CLINIC | Age: 58
End: 2018-10-29
Payer: COMMERCIAL

## 2018-10-29 ENCOUNTER — APPOINTMENT (INPATIENT)
Dept: NEUROLOGY | Facility: HOSPITAL | Age: 58
DRG: 638 | End: 2018-10-29
Payer: COMMERCIAL

## 2018-10-29 ENCOUNTER — OFFICE VISIT (OUTPATIENT)
Dept: PHYSICAL THERAPY | Facility: CLINIC | Age: 58
End: 2018-10-29
Payer: COMMERCIAL

## 2018-10-29 ENCOUNTER — APPOINTMENT (OUTPATIENT)
Dept: SPEECH THERAPY | Facility: CLINIC | Age: 58
End: 2018-10-29
Payer: COMMERCIAL

## 2018-10-29 ENCOUNTER — APPOINTMENT (INPATIENT)
Dept: NON INVASIVE DIAGNOSTICS | Facility: HOSPITAL | Age: 58
DRG: 638 | End: 2018-10-29
Payer: COMMERCIAL

## 2018-10-29 ENCOUNTER — APPOINTMENT (INPATIENT)
Dept: MRI IMAGING | Facility: HOSPITAL | Age: 58
DRG: 638 | End: 2018-10-29
Payer: COMMERCIAL

## 2018-10-29 VITALS
RESPIRATION RATE: 20 BRPM | HEIGHT: 68 IN | DIASTOLIC BLOOD PRESSURE: 71 MMHG | TEMPERATURE: 98.7 F | WEIGHT: 233.69 LBS | BODY MASS INDEX: 35.42 KG/M2 | OXYGEN SATURATION: 95 % | SYSTOLIC BLOOD PRESSURE: 145 MMHG | HEART RATE: 77 BPM

## 2018-10-29 PROBLEM — G45.9 TIA (TRANSIENT ISCHEMIC ATTACK): Status: ACTIVE | Noted: 2018-10-28

## 2018-10-29 LAB
ANION GAP SERPL CALCULATED.3IONS-SCNC: 12 MMOL/L (ref 4–13)
APTT PPP: 38 SECONDS (ref 24–36)
ATRIAL RATE: 61 BPM
BUN SERPL-MCNC: 25 MG/DL (ref 5–25)
CALCIUM SERPL-MCNC: 8.7 MG/DL (ref 8.3–10.1)
CHLORIDE SERPL-SCNC: 106 MMOL/L (ref 100–108)
CHOLEST SERPL-MCNC: 70 MG/DL (ref 50–200)
CO2 SERPL-SCNC: 22 MMOL/L (ref 21–32)
CREAT SERPL-MCNC: 2.22 MG/DL (ref 0.6–1.3)
ERYTHROCYTE [DISTWIDTH] IN BLOOD BY AUTOMATED COUNT: 13.4 % (ref 11.6–15.1)
GFR SERPL CREATININE-BSD FRML MDRD: 31 ML/MIN/1.73SQ M
GLUCOSE SERPL-MCNC: 158 MG/DL (ref 65–140)
GLUCOSE SERPL-MCNC: 171 MG/DL (ref 65–140)
GLUCOSE SERPL-MCNC: 211 MG/DL (ref 65–140)
GLUCOSE SERPL-MCNC: 236 MG/DL (ref 65–140)
HCT VFR BLD AUTO: 35.7 % (ref 36.5–49.3)
HDLC SERPL-MCNC: 25 MG/DL (ref 40–60)
HGB BLD-MCNC: 11.6 G/DL (ref 12–17)
INR PPP: 1.12 (ref 0.86–1.17)
LDLC SERPL CALC-MCNC: 20 MG/DL (ref 0–100)
MAGNESIUM SERPL-MCNC: 1.9 MG/DL (ref 1.6–2.6)
MCH RBC QN AUTO: 28.2 PG (ref 26.8–34.3)
MCHC RBC AUTO-ENTMCNC: 32.5 G/DL (ref 31.4–37.4)
MCV RBC AUTO: 87 FL (ref 82–98)
P AXIS: 53 DEGREES
PLATELET # BLD AUTO: 158 THOUSANDS/UL (ref 149–390)
PMV BLD AUTO: 10.9 FL (ref 8.9–12.7)
POTASSIUM SERPL-SCNC: 4.1 MMOL/L (ref 3.5–5.3)
PR INTERVAL: 184 MS
PROTHROMBIN TIME: 14.1 SECONDS (ref 11.8–14.2)
QRS AXIS: 24 DEGREES
QRSD INTERVAL: 124 MS
QT INTERVAL: 456 MS
QTC INTERVAL: 452 MS
RBC # BLD AUTO: 4.11 MILLION/UL (ref 3.88–5.62)
SODIUM SERPL-SCNC: 140 MMOL/L (ref 136–145)
T WAVE AXIS: 58 DEGREES
TRIGL SERPL-MCNC: 125 MG/DL
VENTRICULAR RATE: 59 BPM
WBC # BLD AUTO: 7.73 THOUSAND/UL (ref 4.31–10.16)

## 2018-10-29 PROCEDURE — 93306 TTE W/DOPPLER COMPLETE: CPT | Performed by: INTERNAL MEDICINE

## 2018-10-29 PROCEDURE — 70551 MRI BRAIN STEM W/O DYE: CPT

## 2018-10-29 PROCEDURE — 95816 EEG AWAKE AND DROWSY: CPT

## 2018-10-29 PROCEDURE — 80048 BASIC METABOLIC PNL TOTAL CA: CPT | Performed by: PHYSICIAN ASSISTANT

## 2018-10-29 PROCEDURE — 85730 THROMBOPLASTIN TIME PARTIAL: CPT | Performed by: INTERNAL MEDICINE

## 2018-10-29 PROCEDURE — 83735 ASSAY OF MAGNESIUM: CPT | Performed by: PHYSICIAN ASSISTANT

## 2018-10-29 PROCEDURE — 80061 LIPID PANEL: CPT | Performed by: PHYSICIAN ASSISTANT

## 2018-10-29 PROCEDURE — 93306 TTE W/DOPPLER COMPLETE: CPT

## 2018-10-29 PROCEDURE — 85027 COMPLETE CBC AUTOMATED: CPT | Performed by: PHYSICIAN ASSISTANT

## 2018-10-29 PROCEDURE — 99356 PR PROLONGED SVC I/P OR OBS SETTING 1ST HOUR: CPT | Performed by: PSYCHIATRY & NEUROLOGY

## 2018-10-29 PROCEDURE — 99239 HOSP IP/OBS DSCHRG MGMT >30: CPT | Performed by: PHYSICIAN ASSISTANT

## 2018-10-29 PROCEDURE — 99233 SBSQ HOSP IP/OBS HIGH 50: CPT | Performed by: NURSE PRACTITIONER

## 2018-10-29 PROCEDURE — 93010 ELECTROCARDIOGRAM REPORT: CPT | Performed by: INTERNAL MEDICINE

## 2018-10-29 PROCEDURE — 99233 SBSQ HOSP IP/OBS HIGH 50: CPT | Performed by: PSYCHIATRY & NEUROLOGY

## 2018-10-29 PROCEDURE — 95816 EEG AWAKE AND DROWSY: CPT | Performed by: PSYCHIATRY & NEUROLOGY

## 2018-10-29 PROCEDURE — 99254 IP/OBS CNSLTJ NEW/EST MOD 60: CPT | Performed by: INTERNAL MEDICINE

## 2018-10-29 PROCEDURE — 82948 REAGENT STRIP/BLOOD GLUCOSE: CPT

## 2018-10-29 PROCEDURE — 85610 PROTHROMBIN TIME: CPT | Performed by: INTERNAL MEDICINE

## 2018-10-29 RX ORDER — INSULIN GLARGINE 100 [IU]/ML
35 INJECTION, SOLUTION SUBCUTANEOUS DAILY
Status: DISCONTINUED | OUTPATIENT
Start: 2018-10-30 | End: 2018-10-29

## 2018-10-29 RX ORDER — INSULIN GLARGINE 100 [IU]/ML
25 INJECTION, SOLUTION SUBCUTANEOUS DAILY
Qty: 10 ML | Refills: 0 | Status: SHIPPED | OUTPATIENT
Start: 2018-10-30 | End: 2019-04-10 | Stop reason: SDUPTHER

## 2018-10-29 RX ORDER — CLOPIDOGREL BISULFATE 75 MG/1
75 TABLET ORAL DAILY
Qty: 30 TABLET | Refills: 1 | Status: SHIPPED | OUTPATIENT
Start: 2018-10-29 | End: 2018-12-26

## 2018-10-29 RX ORDER — CLOPIDOGREL BISULFATE 75 MG/1
300 TABLET ORAL ONCE
Status: COMPLETED | OUTPATIENT
Start: 2018-10-29 | End: 2018-10-29

## 2018-10-29 RX ORDER — INSULIN GLARGINE 100 [IU]/ML
25 INJECTION, SOLUTION SUBCUTANEOUS DAILY
Status: DISCONTINUED | OUTPATIENT
Start: 2018-10-30 | End: 2018-10-29 | Stop reason: HOSPADM

## 2018-10-29 RX ADMIN — AMLODIPINE BESYLATE 5 MG: 5 TABLET ORAL at 04:14

## 2018-10-29 RX ADMIN — FAMOTIDINE 20 MG: 20 TABLET ORAL at 09:06

## 2018-10-29 RX ADMIN — INSULIN LISPRO 5 UNITS: 100 INJECTION, SOLUTION INTRAVENOUS; SUBCUTANEOUS at 11:49

## 2018-10-29 RX ADMIN — AMLODIPINE BESYLATE 5 MG: 5 TABLET ORAL at 17:29

## 2018-10-29 RX ADMIN — INSULIN GLARGINE 33 UNITS: 100 INJECTION, SOLUTION SUBCUTANEOUS at 09:06

## 2018-10-29 RX ADMIN — CARVEDILOL 6.25 MG: 6.25 TABLET, FILM COATED ORAL at 17:30

## 2018-10-29 RX ADMIN — HEPARIN SODIUM 5000 UNITS: 5000 INJECTION, SOLUTION INTRAVENOUS; SUBCUTANEOUS at 14:58

## 2018-10-29 RX ADMIN — GABAPENTIN 600 MG: 250 SUSPENSION ORAL at 09:00

## 2018-10-29 RX ADMIN — INSULIN LISPRO 5 UNITS: 100 INJECTION, SOLUTION INTRAVENOUS; SUBCUTANEOUS at 09:06

## 2018-10-29 RX ADMIN — CARVEDILOL 6.25 MG: 6.25 TABLET, FILM COATED ORAL at 09:06

## 2018-10-29 RX ADMIN — SODIUM CHLORIDE 75 ML/HR: 0.9 INJECTION, SOLUTION INTRAVENOUS at 04:22

## 2018-10-29 RX ADMIN — INSULIN LISPRO 2 UNITS: 100 INJECTION, SOLUTION INTRAVENOUS; SUBCUTANEOUS at 17:31

## 2018-10-29 RX ADMIN — CLOPIDOGREL 300 MG: 75 TABLET, FILM COATED ORAL at 17:30

## 2018-10-29 RX ADMIN — ASPIRIN 81 MG: 81 TABLET, COATED ORAL at 09:06

## 2018-10-29 RX ADMIN — CYANOCOBALAMIN TAB 500 MCG 1000 MCG: 500 TAB at 09:06

## 2018-10-29 RX ADMIN — ATORVASTATIN CALCIUM 80 MG: 40 TABLET, FILM COATED ORAL at 17:30

## 2018-10-29 NOTE — ASSESSMENT & PLAN NOTE
· BP ranging between 140-160  · Continue home BP meds  · Renal function appears to be around baseline-- per review of prior records, Cr has been between 2 2-2 6-- today 2 2  · Continue to monitor, avoid hypotension, repeat BMP in AM

## 2018-10-29 NOTE — ASSESSMENT & PLAN NOTE
Lab Results   Component Value Date    HGBA1C 7 5 (H) 07/28/2018       Recent Labs      10/28/18   1631  10/28/18   2101  10/29/18   0713  10/29/18   1108   POCGLU  287*  155*  171*  236*       Blood Sugar Average: Last 72 hrs:  (P) 184 8   · Last hemoglobin A1c from July indicate fair control of DM  · Patient has had 4 ED visits/hospitalizations for hypoglycemia and has had frequent insulin dose adjustments 2/2 hypoglycemia  · Patient's wife prepares his meals for him & does not skip meals  · Hemoglobin A1c pending  · Continue lantus, but increase back to home regimen: 35U QAM, 6U TID with meals  · QID BS checks  · Nutrition consult placed-- pending evaluation  · Endocrinology consult placed as well-- appreciate their recommendations

## 2018-10-29 NOTE — DISCHARGE INSTRUCTIONS
Sliding scale insulin instructions: If pre-meal blood glucose is 150-199, take an additional 1U of NovoLog with your regular mealtime insulin   If pre-meal blood glucose is 200-249, take an additional 2U of NovoLog with your regular mealtime insulin  If pre-meal blood glucose is 250-299, take an additional 3U of NovoLog with your regular mealtime insulin  If pre-meal blood glucose is 300-349, take an additional 4U of NovoLog with your regular mealtime insulin  If pre-meal blood glucose is 350-400, take an additional 5U of NovoLog with your regular mealtime insulin  If your pre-meal blood glucose is >400, please call your medical provider       äÞÕ ÓßÑ ÇáÏã Ýí ÔÎÕ ãÕÇÈ ÈÏÇÁ ÇáÓßÑí   ãÇ íÌÈ Úáíß ãÚÑÝÊå:   äÞÕ ÇáÓßÑ Ýí ÇáÏã ÚäÏ ãÑÖì ÇáÓßÑ åæ ÍÇáÉ ÎØíÑÉ ÊÍÏË ÚäÏãÇ íäÎÝÖ ãÓÊæì ÇáÌáæßæÒ (ÇáÓßÑ) Ýí ÇáÏã ÈÕæÑÉ ßÈíÑÉ  ÚÇÏÉð ãÇ íßæä ãÓÊæì ÇáÓßÑ Ýí ÇáÏã ãÑÊÝÚðÇ ááÛÇíÉ Ýí ÇáÔÎÕ ÇáãÕÇÈ ÈÏÇÁ ÇáÓßÑí¡ æáßä íãßä áãÓÊæì ÇáÓßÑ ßÐáß Ãä íäÎÝÖ ÈÔÏÉ  æãä Çáãåã ÇáÇáÊÒÇã ÈÎØÉ ÇáÊÍßã Ýí ÏÇÁ ÇáÓßÑí áÏíß áÍÝÇÙ Úáì ËÈÇÊ ãÓÊæì ÇáÓßÑ Ýí ÇáÏã áÏíß  ÅÑÔÇÏÇÊ ÇáÚáÇÌ ÎÇÑÌ ÇáãÓÊÔÝì:   ÇÊÕáí ÈÇáÑÞã 911 Ýí ÍÇá ÍÏæË Ãí ããÇ íáí:   · ÊÚÇäí ãä äæÈÉ Ãæ ÝÞÏÇä ÇáæÚí  · ßäÊ ÊÔÚÑ Ãäß ÓÊÕÇÈ ÈÇáÅÛãÇÁ  · ÊÚÇäí ãä ÕÚæÈÉ Ýí ÇáÊÝßíÑ ÈæÖæÍ  íõÑÌì ØáÈ ÇáÑÚÇíÉ ÝæÑðÇ Ýí ÇáÍÇáÇÊ ÇáÊÇáíÉ:  · íÞá ãÓÊæì ÇáÓßÑ Ýí ÇáÏã áÏíß Úä 50 ãááí ÌÑÇã/ÏíÓíáÊÑ æáÇ íÓÊÌíÈ ááÚáÇÌ  íõÑÌì GIBWGVX ÈãÞÏã ÇáÑÚÇíÉ ÇáÕÍíÉ áÏíß Ýí ÇáÍÇáÇÊ ÇáÊÇáíÉ:   · áÞÏ ÊÚÑÖÊ ãä ÞÈá áÃÚÑÇÖ ÇäÎÝÇÖ ÓßÑ ÇáÏã ÚÏÉ ãÑÇÊ  · ßÇäÊ áÏíß MRDCPYWHW ÈÔÃä ßãíÉ ÇáÃäÓæáíä ÇáÊí ÊÍÕá ÚáíåÇ Ãæ ÏæÇÁ ãÑÖ ÇáÓßÑí ÇáÐí JCVIOVZ  · ÅÐÇ ßÇäÊ áÏíß QHQYIILVR Ãæ ãÎÇæÝ ãÊÚáÞÉ ÈÍÇáÊß ÇáãÑÖíøÉ Ãæ ÈÇáÑÚÇíÉ  ÇáÃÏæíÉ:   · ÇáÅäÓæáíä Ãæ ÏæÇÁ áÚáÇÌ ãÑÖ ÇáÓßÑí  ÊÓÇÚÏ Úáì ÇáÊÍßã ÈãÓÊæì ÓßÑ ÇáÏã áÏíß  · ÇáÌáæßÇÌíä ([place])  ÞÏ íßæä ÖÑæÑíðÇ ÅÐÇ ßäÊ ÊÚÇäí ãä äÞÕ ÍÇÏ Ýí ÓßÑ ÇáÏã  · ÊäÇæá ÇáÏæÇÁ æÝÞðÇ ááÅÑÔÇÏÇÊ  ÇÊÕá ÈãÞÏã ÇáÑÚÇíÉ ÇáÕÍíÉ áÏíß ÅÐÇ ßäÊ ÊÚÊÞÏ Ãä ÇáÏæÇÁ ÇáÐí NQARDMY áÇ íÝíÏß Ãæ ÅÐÇ ßäÊ ÊÚÇäí ãä ÂËÇÑ ÌÇäÈíÉ   ÃÎÈÑå Ãæ ÃÎÈÑåÇ ÅÐÇ ßÇäÊ áÏíß ÍÓÇÓíÉ ãä Ãí ÏæÇÁ  ÇÍÊÝÙ ÈÞÇÆãÉ ÇáÃÏæíÉ æÇáÝíÊÇãíäÇÊ PDYULYBK ÇáÊí ÊÊäÇæáåÇ  ÃÏÑöÌ ÝíåÇ ÌÑÚÇÊ ÇáÃÏæíÉ æãæÇÚíÏåÇ æÓÈÈ ÊäÇæáåÇ  ÃÍÖöÑ ãÚß åÐå ÇáÞÇÆãÉ Ãæ ÚÈæÇÊ ÇáÃÞÑÇÕ ÚäÏ ÒíÇÑÇÊ ÇáãÊÇÈÚÉ  Jarod Anderson ÞÇÆãÉ ÇáÃÏæíÉ ãÚß ÊÍÓÈðÇ áÍÇáÇÊ ÇáØæÇÑÆ  íÌÈ ÇáãÊÇÈÚÉ ãÚ ãÞÏã ÇáÑÚÇíÉ ÇáÕÍíÉ JTVSDYM Úäß æÝÞðÇ ááÅÑÔÇÏÇÊ:  ÞÏ ÊÍÊÇÌ Åáì ÊÛííÑ ÌÑÚÇÊ ÃÏæíÉ ÇáÃäÓæáíä Ãæ ÃÏæíÉ ÇáÓßÑ ÇáÝãæíÉ ÅÐÇ ßäÊ ÊÚÇäí ãä äÞÕ ÓßÑ ÇáÏã  íõÑÌì ÊÏæíä ÃÓÆáÊß áÊÊÐßøÑ ØÑÍåÇ ÃËäÇÁ ÒíÇÑÇÊß ááØÈíÈ  ÇáÊÍßã Ýí äÞÕ ÓßÑ ÇáÏã:   · ÇÝÍÕ ãÓÊæì ÇáÓßÑ Ýí ÇáÏã Úáì ÇáÝæÑ ÅÐÇ ßäÊ ÊÚÇäí ãä ÃÚÑÇÖ äÞÕ ÇáÓßÑ Ýí ÇáÏã  ÚÇÏÉð ãÇ íßæä äÞÕ ÓßÑ ÇáÏã ÈäÓÈÉ 70 ãááí ÌÑÇã/ÏíÓíáÊÑ Ãæ ÃÞá ãä Ðáß  ÇÓÊÝÓÑ ãä ãÞÏã ÇáÑÚÇíÉ ÇáÕÍíÉ Çáí íÈÇÔÑ ÍÇáÊß Ãí ãÓÊæì ãä ÓßÑ ÇáÏã íßæä ãäÎÝÖðÇ ÌÏðÇ ÈÇáäÓÈÉ áß  · ÅÐÇ ßÇä ãÓÊæì ÇáÓßÑ Ýí ÇáÏã áÏíß ãäÎÝÖðÇ ááÛÇíÉ¡ EFQVAV Ãæ ÇÔÑÈ ãÞÏÇÑ 15 ÌÑÇãðÇ ãä ÇáÃØÚãÉ CZQQPZHLWN ÇáãáíÆÉ ÈÇáßÑÈæåíÏÑÇÊ ÓÑíÚÉ ÇáãÝÚæá  ãä ÇáÃãËáÉ ÇáÊí ÊÚÈÑ Úä åÐÇ ÇáãÞÏÇÑ ãä ÇáßÑÈæåíÏÑÇÊ ÓÑíÚÉ ÇáãÝÚæá ÔÑÈ ÃÑÈÚ ÃæÞíÇÊ (äÕÝ ßæÈ) ãä ÚÕíÑ ÇáÝæÇßå Ãæ ÃÑÈÚ ÃæÞíÇÊ ãä ÇáÕæÏÇ ÇáÚÇÏíÉ  æÊÔÊãá ÃãËáÉ ÃÎÑì Úáì ÊäÇæá ãáÚÞÊíä ÕÛíÑÊíä ãä ÇáÒÈíÈ Ãæ ÃÎÐ 3 Åáì 4 ÃÞÑÇÕ ãä ÇáÌáæßæÒ  ÇÝÍÕ ãÓÊæì ÇáÓßÑ Ýí ÇáÏã ãÑÉ ÃÎÑì ÈÚÏ ãÑæÑ 15 ÏÞíÞÉ  ÅÐÇ ßÇä ãÓÊæì ÇáÓßÑ áÇ íÒÇá ãäÎÝÖðÇ (ÃÞá ãä 100 ãááí ÌÑÇã/ÏíÓíáÊÑ)¡ ÝÊäÇæá 15 ÌÑÇãðÇ ÃÎÑì ãä ÇáßÑÈæåíÏÑÇÊ  ÚäÏãÇ íÚæÏ ãÓÊæì ÇáÓßÑ Åáì 100 ãááí ÌÑÇã/ÏíÓíáÊÑ¡ ÝÊäÇæá æÌÈÉ ÎÝíÝÉ Ãæ æÌÈÉ ÊÍÊæí Úáì ßÑÈæåíÏÑÇÊ  ÓíÓÇÚÏ Ðáß Úáì ÊÌäÈ ÇäÎÝÇÖ ãÓÊæì ÇáÓßÑ Ýí ÇáÏã ãÑÉ ÃÎÑì  ÇÍÑÕ ÏÇÆãðÇ Úáì ÇÊøÈÇÚ ÅÑÔÇÏÇÊ ãÞÏã ÇáÑÚÇíÉ ÇáÕÍíÉ áÏíß ÇáÊí ÊÊÚáÞ ÈßíÝíÉ ÚáÇÌ ãÓÊæíÇÊ ÓßÑ ÇáÏã ÇáãäÎÝÖÉ  · ÇÍãá ãÚß ÏÇÆãðÇ ãÕÏÑ ááßÑÈæåíÏÑÇÊ ÓÑíÚÉ ÇáãÝÚæá  ÅÐÇ ßäÊ ÊÚÇäí ãä ÃÚÑÇÖ äÞÕ ÓßÑ ÇáÏã æáÇ íæÌÏ áÏíß ÌåÇÒ ÞíÇÓ äÓÈÉ ÇáÓßÑ Ýí ÇáÏã¡ ÝÊäÇæá ãÕÏÑðÇ ááßÑÈæåíÏÑÇÊ ÓÑíÚÉ ÇáãÝÚæá Úáì Ãí ÍÇá  ÊÌäÈ ÇáÃÛÐíÉ ÇáÊí ÊÍÊæí Úáì ÇáßÑÈæåíÏÑÇÊ æÇáÊí Êßæä ÛäíÉ ÈÇáÏåæä  ÝÞÏ íÄÏì ÇáãÍÊæì ÇáÏåäí Åáì ÇÓÊÛÑÇÞ æÞÊ ÃØæá Ýí ÒíÇÏÉ ãÓÊæì ÇáÓßÑ Ýí ÇáÏã áÏíß  ÇÓÊÝÓÑ ãä ãÞÏã ÇáÑÚÇíÉ ÇáÕÍíÉ ÅÐÇ ßÇä íÌÈ Ãä ÊÍãá ãÌãæÚÉ ÌáæßÇÌæä  [id number] åæ ÏæÇÁ íÊã ÍÞäå ÚäÏ ÍÏæË äÞÕ ÍÇÏ Ýí ÓßÑ ÇáÏã æÝÞÏÇä ÇáæÚí  ÊÍÞÞ ãä ÊÇÑíÎ ÇäÊåÇÁ MEUUWKXF ßá ÔåÑ [id number] ÞÈá Ãä ÊäÊåí ÕáÇÍíÊåÇ  · ÃÎÈÑ ÇáÂÎÑíä Úä ßíÝíÉ ãÓÇÚÏÊß ÅÐÇ ßäÊ ÊÚÇäí ãä ÃÚÑÇÖ äÞÕ ÓßÑ ÇáÏã  ÃÎÈÑåã Úä ÃÚÑÇÖ äÞÕ ÓßÑ ÇáÏã  ÇØáÈ ãäåã ÅÚØÇÆß ãÕÏÑðÇ ááßÑÈæåíÏÑÇÊ ÓÑíÚÉ ÇáãÝÚæá ÅÐÇ áã ÊÊãßä ãä ÇáÍÕæá ÚáíåÇ ÈäÝÓß  ÇØáÈ ãäåã ÅÚØÇÆß ÍÞäÉ OTWWCAQU ÅÐÇ ßäÊ ÊÚÇäí ãä ÃÚÑÇÖ äÞÕ ÓßÑ ÇáÏã æÝÞÏÊ æÚíß¡ Ãæ ÊÚÇäí ãä äæÈÉ  ÇØáÈ ãäåã [facility] [id number]   ÝåÐå ÍÇáÉ ØÇÑÆÉ  ÃÎÈÑåã ÈÃáÇ íÍÇæáæÇ Ã ANINKM ÊÈÊáÚ Ãí ÔíÁ ÅÐÇ ÊÚÑÖÊ ááÅÛãÇÁ Ãæ áäæÈÉ  · Úáíß ÇÑÊÏÇÁ ÚáÇãÇÊ ááÊäÈíå ÇáØÈí  Ãæ ÇÍãá ÈØÇÞÉ ÊÝíÏ ÈÃäß ãÕÇÈ ÈÇáÓßÑí  ÇÓÃá ãä Ãíä ÊÍÕá Úáì åÐå ÇáÃÔíÇÁ  ÇáæÞÇíÉ ãä ÇäÎÝÇÖ ÇáÓßÑ Ýí ÇáÏã:   · ÊäÇæá ÏæÇÁ ÇáÓßÑ æÝÞðÇ ááÅÑÔÇÏÇÊ  ÊäÇæá ÇáÏæÇÁ Ýí ÇáæÞÊ ÇáãäÇÓÈ æÈÇáßãíÉ ÇáãäÇÓÈÉ  ÞÏ íÛíÑ ãÞÏã ÇáÑÚÇíÉ ÇáÕÍíÉ áÏíß ãÓÊæíÇÊ ÇáÓßÑ FNEVTGEQ Ýí Ïãß ÅÐÇ ßäÊ ÊÚÇäí Ýí ßËíÑ ãä ÇáÃÍíÇä ãä äÞÕ ÇáÓßÑ Ýí ÇáÏã  · ÊäÇæá ÇáæÌÈÇÊ ZFTPXUNG ÇáÎÝíÝÉ ÈÔßá ãäÊÙã  ÇÓÊÝÓÑ ãä ÇÎÊÕÇÕí ÇáÊÛÐíÉ Ãæ ãä ãÞÏã ÇáÑÚÇíÉ ÇáÕÍíÉ áÏíß Úä äÙÇã ÇáæÌÈÇÊ ÇáãäÇÓÈ áß  áÇ Êåãá ÊäÇæá ÇáæÌÈÇÊ  · ÊÍÞÞ ãä ãÓÊæì ÇáÓßÑ Ýí ÇáÏã áÏíßö æÝÞðÇ ááÅÑÔÇÏÇÊ  ÇÓÃá ãÞÏã ÇáÑÚÇíÉ ÇáÕÍíÉ Úä ÇáãÓÊæíÇÊ ÇáÊí íÌÈ Ãä íßæä ÚáíåÇ ÓßÑ ÇáÏã áÏíßö ÞÈá ÊäÇæá ÇáØÚÇã æÈÚÏå  ÇÓÃá ãÞÏã ÇáÑÚÇíÉ Úä ÚÏÏ ãÑÇÊ ÝÍÕ ÓßÑ ÇáÏã æßíÝíÉ ÝÍÕå  ÞÏ ÊÍÊÇÌ Åáì ÇáÝÍÕ 3 ãÑÇÊ Ýí Çáíæã Úáì ÇáÃÞá  ÓÌøá äÊÇÆÌ ãÓÊæì ÇáÓßÑ Ýí ÇáÏã æÇÕØÍÈ ÇáÓÌá ÇáÎÇÕ ÈåÇ ãÚß ÚäÏ ÒíÇÑÉ ãÞÏã ÇáÑÚÇíÉ ÇáÕÍíÉ  íãßä áãÞÏã ÇáÑÚÇíÉ ÇáÕÍíÉ ÇÓÊÎÏÇã ÇáÓÌá áÅÌÑÇÁ ÊÚÏíáÇÊ Úáì ÇáÏæÇÁ Ãæ ÇáØÚÇã Ãæ ãæÇÚíÏ ÇáÊÏÑíÈ ÇáÎÇÕÉ Èß  · ÇÝÍÕ ãÓÊæì ÇáÓßÑ Ýí ÇáÏã ÞÈá ÇáÊÏÑíÈ  íãßä ááÊãÇÑíä Ãä ÊÞáá ãä ãÓÊæì ÇáÓßÑ Ýí ÇáÏã áÏíß  ÅÐÇ ßÇä ãÓÊæì ÓßÑ ÇáÏã ÚäÏß ÃÞá ãä 100 ãáÌã/ÏíÓíáÊÑ¡ CRKTGK æÌÈÉ ÎÝíÝÉ ãä ÇáßÑÈæåíÏÑÇÊ  ãËá 4 Åáì 6 ÞØÚ ãä ÇáÈÓßæíÊ ÇáããáÍ¡ ½ ãæÒÉ¡ 8 ÃæäÕÇÊ (ßæÈ æÇÍÏ) ãä ÇáÍáíÈ ãäÒæÚ ÇáÏÓã Ãæ 1% ãä ÇáÍáíÈ¡ Ãæ 4 ÃæäÕÇÊ (äÕÝ ßæÈ) ãä ÇáÚÕíÑ  ÅÐÇ ßÇäÊ ÓÊÄÏí ÇáÊãÑíäÇÊ áãÏÉ ÊÒíÏ Úä ÓÇÚÉ æÇÍÏÉ¡ ÝÞÏ ÊÍÊÇÌ Åáì ÇáÊÍÞÞ ãä ãÓÊæì ÇáÓßÑ Ýí ÇáÏã áÏíß ßá 30 ÏÞíÞÉ  ÞÏ íäÕÍß ãÞÏã ÇáÑÚÇíÉ ÇáÕÍíÉ ÇáÎÇÕ Èß ßÐáß ÈÃä ÊÝÍÕ ãÓÊæì ÇáÓßÑ Ýí ÇáÏã áÏíß ÈÚÏ ÇáÊÏÑíÈ  · ÇäÊÈå Åáì ßíÝ ÊÄËÑ YFNUWVHFH Úáì ãÓÊæì ÇáÓßÑ ÈÇáÏã áÏíß  ÞÏ ÊÊÓÈÈ CNTSTJOZC Ýí ÇäÎÝÇÖ ãÓÊæì ÓßÑ ÇáÏã áãÏÉ ÊÕá Åáì 12 ÓÇÚÉ ÈÚÏ ÊäÇæáå  ÇÓÊÝÓÑ ãä ãÞÏã ÇáÑÚÇíÉ ÇáÕÍíÉ áÏíß ÚãÇ ÅÐÇ ßÇäÊ ARNIWDVDB TZEMIABV ÂãäÉ IGAFSEH áß Ãã áÇ  ÅÐÇ ßäÊ BUAHHZ BZTWTPCXS¡ ÝÇÍÑÕ ÏÇÆãðÇ Úáì ÊäÇæá æÌÈÉ ÎÝíÝÉ Ãæ æÌÈÉ ÚÇÏíÉ Ýí äÝÓ æÞÊ ÊäÇæá IREISTDQE   íäÈÛí Ãä ÊÞáá ÇáäÓÇÁ ãä ÊäÇæá KHALKWJPE Åáì ãÔÑæÈ æÇÍÏ íæãíðÇ  íäÈÛí Ãä ZACR PMPDKY ãä ÊäÇæá HQKBOCLOT Åáì ãÔÑæÈíä íæãíðÇ  íÚÇÏá ãÔÑæÈ ÇáßÍæá 12 ÃæÞíÉ ãä ÇáÈíÑÉ¡ Ãæ 5 ÃæÞíÇÊ ãä ÇáäÈíÐ¡ Ãæ ÃæÞíÉ æäÕÝ ãä RGXQUVGIZ ÇáÑæÍíÉ ÇáãÞØÑÉ  © 2017 [address] Information is for End User's use only and may not be sold, redistributed or otherwise used for commercial purposes  All illustrations and images included in CareNotes® are the copyrighted property of [employer]  or Vahid Jacobo  The above information is an  only  It is not intended as medical advice for individual conditions or treatments  Talk to your doctor, nurse or pharmacist before following any medical regimen to see if it is safe and effective for you

## 2018-10-29 NOTE — CONSULTS
Consultation - Cris Medina 62 y o  male MRN: [id number]    Unit/Bed#: -74 Encounter: [id number]      Assessment/Plan     Assessment: This is a 62y o -year-old male with diabetes with hyperglycemia, and hypoglycemia, admitted with chief complaint of right-sided weakness, and aphasia    Plan:  1  Type 2 diabetes, uncontrolled with hyper and hypoglycemia-   -considering CKD, and multiple episodes of hypoglycemia in the past, will reduce Lantus to 25 units in the morning  -will increase Humalog to 8 units with meals, as patient is currently on 75 g of carbohydrate diet  -at home he usually eats 30-45 g of carbohydrate with meals  -on discharge we would recommend Lantus 25 units in the morning, Humalog 5 units with meals, plus scale, discuss to hold Humalog if blood sugar less than 100 before meals  -discuss not to take Humalog the day he is going to [employer], because of risk of hypoglycemia  -discussed to follow up with Endocrinology in 2 weeks after discharge, for follow-up, he will benefit from continues glucose monitor sensor (personal), discussed to start checking blood sugar 4 times daily and bring the log for appointment, he will also need appointment with nutrition as well as diabetes education on outpatient basis  -hypoglycemia symptoms and treatment reviewed  -patient will need glucagon injection(emergency kit) for hypoglycemia on discharge    2  CKD- creatinine is stable, not a candidate for metformin therapy  Insulin regimen adjusted    3  Right-sided weakness-neurology is on board  MRI is normal   EEG - not suggestive of seizures     Thank you for consulting Endocrinology, please do not hesitate to call if you have any questions      CC: Diabetes Consult    History of Present Illness     HPI: Cris Medina is a 62y o  year old male with type 2 diabetes, uncontrolled, hyperlipidemia, hypertension, neuropathy, presented with symptoms of right-sided weakness, aphasia, diaphoresis and generalized weakness,  She has history of stroke with right-sided residual weakness  At home he takes Lantus 30 units in the morning, Humalog 6 units with meals  He checks blood sugars 2 times daily, his blood sugars are usually variable,  range    He was [facility] [employer] yesterday, started to feel weakness on right side, he did not have his Glucometer with him, so blood sugar was not checked, however he ate 1 donuts, and juice, and in the ER his blood sugar was 75, which makes it suspicious that his blood symptoms was related to hypoglycemia  Usually he takes Humalog 6 units with meals  He took 6 unit of Humalog, ate  2 piece of bread as well as two eggs  for breakfast   He complains of complication of diabetes such as retinopathy and neuropathy  He is  currently followed by his primary care physician    Lab Results   Component Value Date    HGBA1C 7 5 (H) 07/28/2018       Inpatient consult to Endocrinology  Consult performed by: [facility]  ordered by: Zahraa Jett          Review of Systems   Constitutional: Positive for activity change and fatigue  Negative for diaphoresis, fever and unexpected weight change  HENT: Negative  Eyes: Negative for visual disturbance  Respiratory: Negative for cough, chest tightness and shortness of breath  Cardiovascular: Negative for chest pain, palpitations and leg swelling  Gastrointestinal: Negative for abdominal pain, blood in stool, constipation, diarrhea, nausea and vomiting  Endocrine: Positive for polydipsia, polyphagia and polyuria  Negative for cold intolerance and heat intolerance  Genitourinary: Negative for dysuria, enuresis, frequency and urgency  Musculoskeletal: Negative for arthralgias and myalgias  Skin: Negative for pallor, rash and wound  Allergic/Immunologic: Negative  Neurological: Positive for weakness and numbness  Negative for dizziness and tremors  Hematological: Negative  Psychiatric/Behavioral: Negative  Historical Information   Past Medical History:   Diagnosis Date    Diabetes mellitus ([facility] [address] )     Hypercholesteremia     Hyperlipidemia     Hypertension     Neuropathy     Obesity     Osteomyelitis ([facility] [address] )     last assessed 11/4/16    PVC's (premature ventricular contractions)     sees cardiology Dr Anupam camargo    Stroke [facility])     last weeof July 2018 [address] [place] [address]     Past Surgical History:   Procedure Laterality Date    ABDOMINAL SURGERY      CHOLECYSTECTOMY      Percutaneous    OTHER SURGICAL HISTORY      "stimulator to control bowel movements"    SC ESOPHAGOGASTRODUODENOSCOPY TRANSORAL DIAGNOSTIC N/A 9/27/2016    Procedure: ESOPHAGOGASTRODUODENOSCOPY (EGD); Surgeon: Daja García MD;  [facility];   Service: Gastroenterology    SC LAP,CHOLECYSTECTOMY N/A 2/29/2016    Procedure: LAPAROSCOPIC CHOLECYSTECTOMY ;  Surgeon: Gita Patterson DO;  [facility];  Service: General    ROTATOR CUFF REPAIR Right     TOE AMPUTATION Right 10/28/2016    Procedure: 3RD TOE AMPUTATION ;  Surgeon: Nicolle Judge DPM;  [facility];  Service:      Social History   History   Alcohol Use No     History   Drug Use No     History   Smoking Status    Never Smoker   Smokeless Tobacco    Never Used     Family History:   Family History   Problem Relation Age of Onset    Leukemia Mother     Liver disease Mother     Lung cancer Mother         heavy smoker - 3 ppd    Heart disease Father     Liver disease Father     Multiple myeloma Sister     Breast cancer Sister     Urolithiasis Family     Alcohol abuse Neg Hx     Depression Neg Hx     Drug abuse Neg Hx     Substance Abuse Neg Hx        Meds/Allergies   Current Facility-Administered Medications   Medication Dose Route Frequency Provider Last Rate Last Dose    amLODIPine (NORVASC) tablet 5 mg  5 mg Oral Q12H Val Rodarte PA-C   5 mg at 10/29/18 0414    aspirin (ECOTRIN LOW STRENGTH) EC tablet 81 mg  81 mg Oral Daily Val Rodarte PA-C   81 mg at 10/29/18 8403    atorvastatin (LIPITOR) tablet 80 mg  80 mg Oral Daily With [employer] KIMBERLY ALLISON Rodarte   80 mg at 10/28/18 1653    carvedilol (COREG) tablet 6 25 mg  6 25 mg Oral BID With Meals Val Rodarte PA-C   6 25 mg at 10/29/18 9324    cyanocobalamin (VITAMIN B-12) tablet 1,000 mcg  1,000 mcg Oral Daily Val Rodarte PA-C   1,000 mcg at 10/29/18 0906    diphenoxylate-atropine (LOMOTIL) 2 5-0 025 mg per tablet 1 tablet  1 tablet Oral 4x Daily PRN Val Rodarte PA-C        escitalopram (LEXAPRO) tablet 5 mg  5 mg Oral Daily Val Rodarte PA-C   5 mg at 10/28/18 2118    famotidine (PEPCID) tablet 20 mg  20 mg Oral Daily Val Rodarte PA-C   20 mg at 10/29/18 0906    gabapentin (NEURONTIN) oral solution 600 mg  600 mg Oral Daily Edda Biggs MD   600 mg at 10/29/18 0900    glucose chewable tablet 12 g  12 g Oral PRN Val Rodarte PA-C        heparin (porcine) subcutaneous injection 5,000 Units  5,000 Units Subcutaneous Q8H [address] Val Rodarte PA-C   5,000 Units at 10/29/18 1458    [START ON 10/30/2018] insulin glargine (LANTUS) subcutaneous injection 35 Units 0 35 mL  35 Units Subcutaneous Daily Val Rodarte PA-C        insulin lispro (HumaLOG) 100 units/mL subcutaneous injection 1-6 Units  1-6 Units Subcutaneous TID AC Val Rodarte PA-C        insulin lispro (HumaLOG) 100 units/mL subcutaneous injection 1-6 Units  1-6 Units Subcutaneous HS Val Rodarte PA-C        insulin lispro (HumaLOG) 100 units/mL subcutaneous injection 6 Units  6 Units Subcutaneous TID With Meals Val A Hillegass, PA-C        lactulose 20 g/30 mL oral solution 20 g  20 g Oral Daily PRN Val Rodarte PA-C        sodium chloride 0 9 % infusion  75 mL/hr Intravenous Continuous Val Rodarte PA-C 75 mL/hr at 10/29/18 0422 75 mL/hr at 10/29/18 0422     No Known Allergies    Objective Vitals: Blood pressure 164/77, pulse 77, temperature 98 3 °F (36 8 °C), temperature source Oral, resp  rate 20, height 5' 8" (1 727 m), weight 106 kg (233 lb 11 oz), SpO2 97 %  Intake/Output Summary (Last 24 hours) at 10/29/18 1505  Last data filed at 10/29/18 1148   Gross per 24 hour   Intake           1077 5 ml   Output              650 ml   Net            427 5 ml     Invasive Devices     Peripheral Intravenous Line            Peripheral IV 10/28/18 Right Antecubital 1 day    Peripheral IV 10/28/18 Right Wrist 1 day                Physical Exam    The history was obtained from the review of the chart, patient  Lab Results:       Lab Results   Component Value Date    WBC 7 73 10/29/2018    HGB 11 6 (L) 10/29/2018    HCT 35 7 (L) 10/29/2018    MCV 87 10/29/2018     10/29/2018     Lab Results   Component Value Date/Time    BUN 25 10/29/2018 05:02 AM    BUN 15 08/10/2016 08:50 AM     10/29/2018 05:02 AM     08/10/2016 08:50 AM    K 4 1 10/29/2018 05:02 AM    K 4 4 08/10/2016 08:50 AM     10/29/2018 05:02 AM     08/10/2016 08:50 AM    CO2 22 10/29/2018 05:02 AM    CO2 26 08/10/2016 08:50 AM    CREATININE 2 22 (H) 10/29/2018 05:02 AM    CREATININE 1 20 08/10/2016 08:50 AM    AST 24 10/06/2018 03:31 PM    AST 16 08/10/2016 08:50 AM    ALT 32 10/06/2018 03:31 PM    ALT 20 08/10/2016 08:50 AM    ALB 3 3 (L) 10/06/2018 03:31 PM    ALB 4 0 08/10/2016 08:50 AM     Recent Labs      10/29/18   0502   HDL  25*   TRIG  125     No results found for: Radha Frostt  POC Glucose   Date Value   10/29/2018 236 mg/dl (H)   10/29/2018 171 mg/dl (H)   10/28/2018 155 mg/dl (H)   10/28/2018 287 mg/dl (H)   10/28/2018 75 mg/dl   10/06/2018 261 mg/dl (H)   09/27/2018 122 mg/dl   09/27/2018 140 mg/dl   09/26/2018 158 mg/dl (H)   09/26/2018 142 mg/dl (H)   08/16/2016 280       Imaging Studies: I have personally reviewed pertinent reports        Portions of the record may have been created with voice recognition software

## 2018-10-29 NOTE — PROGRESS NOTES
Neurology Progress Note - Snow Dunbar 1960, 62 y o  male   MRN: [id number] Unit/Bed#: -01 Encounter: [id number]        Cerebrovascular accident (CVA) due to thrombosis of left middle cerebral artery ([facility] [address] )   Assessment & Plan    His stroke in July of 2018 had as it is presenting symptoms right-sided deficits with language dysfunction somewhat similar to his presentation this time     I do not see that he had any hypoglycemia at that time although he has been hospitalized here at [facility] for 3 episodes of hypoglycemia all very briefly according to his family  After tPA in July he remained with a significant what appears to be a basal ganglia infarct on MRI he had improvement of his symptoms  Today he is awake and his family feels that he is at baseline  We will add Plavix to his aspirin for a higher regimen of anti-platelet medication until he is seen in the office  We have loaded him with the 300 at this time  Additionally he has significant cognitive and memory processing deficits since his stroke of July  Needs to have outpatient psych evaluation potentially then seen also by 1 of the members of our stroke team in a month rather than waiting 2 months, then he can see Dr Ayanna Lutz at his regularly scheduled December appointment  I have discussed with the patient and his son at the bedside that he is to continue to work on his cognitive exercises at home and specifically have discus things for the patient to be do do at home   * Stroke-like symptoms, with right-sided weakness   Assessment & Plan    This am he has had no reoccurrence of his symptoms from yesterday  His MRI was negative and his EEG had changes consistent with his stroke and his small vessel disease  His echo notes no significant changes  He has a December 26th appointment with Dr Ayanna Lutz of our stroke team in the office  Depending on the outcome of this workup may need an appointment that is moved up potentially  Subjective/Objective     Subjective:  I can't remember    ROS:  The patient reports that he feels fine today  His son and wife both report that he seems to be back to his baseline  Denies any complaints of pain with the specific systematic query  The family is concerned about the patient's memory and his cognitive abilities  In discussing this with his son at the bedside he does report that he has had clear memory deterioration since his stroke in the summer  However upon questioning he does admit that yes the family does not pressed the patient to do things for himself or work any harder  Does report that the patient works only on the physical therapy or cognitive and speech therapy tasks he is given at therapy and otherwise does nothing  I discussed with the patient that his at home therapy is going to be accelerated but clearly he can have encoding changes since his stroke that coupled with the culture of him being waited upon by his family is resulting in his increased disability  Current Facility-Administered Medications:  amLODIPine 5 mg Oral Q12H   aspirin 81 mg Oral Daily   atorvastatin 80 mg Oral Daily With Dinner   carvedilol 6 25 mg Oral BID With Meals   clopidogrel 300 mg Oral Once   cyanocobalamin 1,000 mcg Oral Daily   diphenoxylate-atropine 1 tablet Oral 4x Daily PRN   escitalopram 5 mg Oral Daily   famotidine 20 mg Oral Daily   gabapentin 600 mg Oral Daily   glucose 12 g Oral PRN   heparin (porcine) 5,000 Units Subcutaneous Q8H [address]    insulin glargine 25 Units Subcutaneous Daily   insulin lispro 1-6 Units Subcutaneous TID AC   insulin lispro 1-6 Units Subcutaneous HS   insulin lispro 8 Units Subcutaneous TID With Meals   lactulose 20 g Oral Daily PRN   sodium chloride 75 mL/hr Intravenous Continuous     diphenoxylate-atropine    glucose    lactulose    Vitals: Blood pressure (!) 184/98, pulse 78, temperature 98 7 °F (37 1 °C), temperature source Oral, resp   rate 20, height 5' 8" (1 727 m), weight 106 kg (233 lb 11 oz), SpO2 95 %  ,Body mass index is 35 53 kg/m²  Physical Exam:     Reyna Broussard seen in:  In his room on 2 separate occasions as well as down an MRI to have his stimulator turned off  General appearance: alert, conversant  Neck, Lungs, Heart, & abdomen: WNL  Extremities: atraumatic, no cyanosis or edema    Neurologic:   Mental status: Alert, oriented, to his name and his age and his family members  He denies any time orientation reports that he has an [place]  His cognitive exam was poor he needed multiple attempts to recall even at 90 seconds to 2 minutes items that he had registered multiple times  He had several difficulties with picture identification of low use items  He had difficulty with following 2 part commands  He had difficulty with several tasks attempted at the bedside  However with really focusing his attention did somewhat better but overall his performance was really quite poor  CN: exam EOM's I, Gaze conjugate  Non lateralizing sensory & motor exam, (PP not tested on face)  Reminder CNVIII-XII normal    Motor: full power age appropriate x 4 limbs  Sensory: grossly intact  X 4 limbs, PP not tested  Cerebellar: no ataxia or past pointing w pronation from a modified Romberg position  Finger taps age appropriate  Gait:  Somewhat impulsive with poor environmental scanning or planning  He had several missed steps which she was able to catch himself because of the rapidity of his gait  Required cuing to slow down  He did well though with a handhold I believe he would do better if he were moved to a cane and then encouraged to walk more with his family at home  Lab Results:   I have personally reviewed pertinent reports    , CBC:   Results from last 7 days  Lab Units 10/29/18  0502 10/28/18  1112   WBC Thousand/uL 7 73 8 56   RBC Million/uL 4 11 4 89   HEMOGLOBIN g/dL 11 6* 13 9   I STAT HEMOGLOBIN g/dl  --  13 9   HEMATOCRIT % 35 7* 42 5  41   MCV fL 87 87   PLATELETS Thousands/uL 158 175   , BMP/CMP:   Results from last 7 days  Lab Units 10/29/18  0502 10/28/18  1112   SODIUM mmol/L 140 139   POTASSIUM mmol/L 4 1 4 0   CHLORIDE mmol/L 106 106   CO2 mmol/L 22 23   BUN mg/dL 25 32*   CREATININE mg/dL 2 22* 2 32*   GLUCOSE, ISTAT mg/dl  --  91   CALCIUM mg/dL 8 7 9 6   EGFR ml/min/1 73sq m 31 32  30        Imaging Studies: I have personally reviewed pertinent films in PACS on my review was negative for acute ischemia he has clearly advanced disease and may have a component of vascular dementia which neuropsych testing may help a discern  EEG, Echo, Pathology, and Other Studies: His EEG is negative for any epileptic features he clearly did have of slowed features which were consistent with his known structural stroke  Counseling / Coordination of Care  Total time spent today 75 minutes  Greater than 50% of total time was spent with the patient and / or family counseling and / or coordination of care  A description of the counseling / coordination of care: All of the above was discussed and reviewed several times with his family and redundant family members  Also he had 3 exams throughout the day today including that in MRI to assess and turn off internal on his stimulator

## 2018-10-29 NOTE — OCCUPATIONAL THERAPY NOTE
Occupational Therapy Note        Patient Name: Renae Schrader  PCMIC'U Date: 10/29/2018    OT orders received and chart review completed  Pt currently not available to participate in OT eval due to w/ neurology Birgit DELANEY   Will continue to follow pt to complete eval as appropriate and schedule allows    [facility], OTR/L

## 2018-10-29 NOTE — ASSESSMENT & PLAN NOTE
Lab Results   Component Value Date    HGBA1C 7 5 (H) 07/28/2018       Recent Labs      10/28/18   1631  10/28/18   2101  10/29/18   0713  10/29/18   1108   POCGLU  287*  155*  171*  236*       Blood Sugar Average: Last 72 hrs:  (P) 184 8   · With diabetic ulcer on ball of L foot-- no surrounding cellulitis and drainage is sero-sang  · Continue local wound care  · Follows podiatry as an outpatient

## 2018-10-29 NOTE — PROGRESS NOTES
Progress Note - Stefanie Cordero 1960, 62 y o  male MRN: [id number]    Unit/Bed#: -23 Encounter: [id number]    Primary Care Provider: Kenna Luciano DO   Date and time admitted to hospital: 10/28/2018 10:58 AM    * Stroke-like symptoms, with right-sided weakness   Assessment & Plan    · POA, now resolved  Family reports diaphoresis and global weakness while at [employer]  Upon arrival to ED patient's R side was flaccid--- this lasted a short period of time and then patient's resolved to his baseline  · Today, patient has had no further episodes of altered mental status/focal weakness  · CT head/CTA in ED do not reveal acute/large changes compared to prior studies  · Discussed with neurology: DDx including new infarct (questionably related to embolic dz), seizure, TIA, etc  Question role of hypoglycemia  · Admit  · Telemetry  · Echo/MRI/EEG  · Echo: EF 55% with G1DD  · MRI: "No acute intracranial abnormality  No restricted diffusion to suggest acute ischemia  Old anterior left thalamic/hypothalamic lacunar infarct  Old left pontine lacunar infarct  Mild scattered periventricular and subcortical foci of white matter T2 hyperintensity which is nonspecific and most likely related to chronic small vessel ischemic changes  Other less likely etiologies include demyelinating disease, vasculitis, Lyme and migraines    · EEG pending  · Neurochecks  · Continue aspirin/statin-- per neuro: if MRI negative, will plavix load and maintain dual anti-platelet therapy Q49 days     Type 2 diabetes mellitus with hypoglycemia, with long-term current use of insulin [facility])   Assessment & Plan    Lab Results   Component Value Date    HGBA1C 7 5 (H) 07/28/2018       Recent Labs      10/28/18   1631  10/28/18   2101  10/29/18   0713  10/29/18   1108   POCGLU  287*  155*  171*  236*       Blood Sugar Average: Last 72 hrs:  (P) 184 8   · Last hemoglobin A1c from July indicate fair control of DM  · Patient has had 4 ED visits/hospitalizations for hypoglycemia and has had frequent insulin dose adjustments 2/2 hypoglycemia  · Patient's wife prepares his meals for him & does not skip meals  · Hemoglobin A1c pending  · Continue lantus, but increase back to home regimen: 35U QAM, 6U TID with meals  · QID BS checks  · Nutrition consult placed-- pending evaluation  · Endocrinology consult placed as well-- appreciate their recommendations     Benign hypertension with CKD (chronic kidney disease) stage III ([facility])   Assessment & Plan    · BP ranging between 140-160  · Continue home BP meds  · Renal function appears to be around baseline-- per review of prior records, Cr has been between 2 2-2 6-- today 2 2  · Continue to monitor, avoid hypotension, repeat BMP in AM     Cerebrovascular accident (CVA) due to thrombosis of left middle cerebral artery ([facility] [address] )   Assessment & Plan    · Occurred 3 months ago  · Patient with R sided deficits-- UE and LE weakness, wide-based gait, speech difficulties  · Follows with neurology as an outpatient  · Patient attends PT/OT/speech therapy as an outpatient      Diabetic polyneuropathy associated with type 2 diabetes mellitus [facility])   Assessment & Plan    Lab Results   Component Value Date    HGBA1C 7 5 (H) 07/28/2018       Recent Labs      10/28/18   1631  10/28/18   2101  10/29/18   0713  10/29/18   1108   POCGLU  287*  155*  171*  236*       Blood Sugar Average: Last 72 hrs:  (P) 184 8   · With diabetic ulcer on ball of L foot-- no surrounding cellulitis and drainage is sero-sang  · Continue local wound care  · Follows podiatry as an outpatient      Mixed hyperlipidemia   Assessment & Plan    · Continue high-dose statin  · Lipid panel: total cholesterol 70, LDL 20, HDL 25     Other constipation   Assessment & Plan    · Patient has nerve stimulator-- currently turned OFF  · Continue stool softeners/laxatives       VTE Pharmacologic Prophylaxis:   Pharmacologic: Heparin  Mechanical VTE Prophylaxis in Place: Yes    Patient Centered Rounds: I have performed bedside rounds with nursing staff today  Discussions with Specialists or Other Care Team Provider: CM, neuro, endo    Education and Discussions with Family / Patient: patient, son at bedside     Time Spent for Care: 30 minutes  More than 50% of total time spent on counseling and coordination of care as described above  Current Length of Stay: 1 day(s)    Current Patient Status: Inpatient   Certification Statement: The patient will continue to require additional inpatient hospital stay due to ongoing neurologic work up     Discharge Plan: d/c home when medically stable-- has outpatient PT/OT/speech in place     Code Status: Level 1 - Full Code      Subjective:   Patient is doing well today  No further episodes of generalized weakness/focal weakness/hypoglycemia  No significant events on telemetry  Objective:     Vitals:   Temp (24hrs), Av 2 °F (36 8 °C), Min:97 8 °F (36 6 °C), Max:98 7 °F (37 1 °C)    Temp:  [97 8 °F (36 6 °C)-98 7 °F (37 1 °C)] 98 3 °F (36 8 °C)  HR:  [70-86] 77  Resp:  [18-20] 20  BP: (148-179)/() 164/77  SpO2:  [96 %-99 %] 97 %  Body mass index is 35 53 kg/m²  Input and Output Summary (last 24 hours): Intake/Output Summary (Last 24 hours) at 10/29/18 1447  Last data filed at 10/29/18 1148   Gross per 24 hour   Intake           2077 5 ml   Output              650 ml   Net           1427 5 ml       Physical Exam:     Physical Exam   Constitutional: He is oriented to person, place, and time  HENT:   Head: Normocephalic and atraumatic  Mouth/Throat: No oropharyngeal exudate  Eyes: Pupils are equal, round, and reactive to light  Conjunctivae and EOM are normal  No scleral icterus  Neck: No JVD present  Cardiovascular: Normal rate and regular rhythm  Exam reveals no gallop and no friction rub  No murmur heard  Pulmonary/Chest: Effort normal and breath sounds normal  No respiratory distress  He has no wheezes  He has no rales  Abdominal: Soft  Bowel sounds are normal  He exhibits no distension  There is no tenderness  There is no rebound  Musculoskeletal: He exhibits no edema or tenderness  Neurological: He is alert and oriented to person, place, and time  He displays normal reflexes  No cranial nerve deficit  He exhibits abnormal muscle tone (baseline R sided deficits again noted)  Skin: Skin is warm and dry  No rash noted  No erythema  Psychiatric: He has a normal mood and affect  His behavior is normal        Additional Data:     Labs:      Results from last 7 days  Lab Units 10/29/18  0502   WBC Thousand/uL 7 73   HEMOGLOBIN g/dL 11 6*   HEMATOCRIT % 35 7*   PLATELETS Thousands/uL 158       Results from last 7 days  Lab Units 10/29/18  0502 10/28/18  1112   SODIUM mmol/L 140 139   POTASSIUM mmol/L 4 1 4 0   CHLORIDE mmol/L 106 106   CO2 mmol/L 22 23   BUN mg/dL 25 32*   CREATININE mg/dL 2 22* 2 32*   ANION GAP ISTAT mmol/L  --  14*   ANION GAP mmol/L 12 10   CALCIUM mg/dL 8 7 9 6   GLUCOSE, ISTAT mg/dl  --  91       Results from last 7 days  Lab Units 10/29/18  0502   INR  1 12       Results from last 7 days  Lab Units 10/29/18  1108 10/29/18  0713 10/28/18  2101 10/28/18  1631 10/28/18  1100   POC GLUCOSE mg/dl 236* 171* 155* 287* 75                   * I Have Reviewed All Lab Data Listed Above  * Additional Pertinent Lab Tests Reviewed:  [address] Admission Reviewed    Imaging:    Imaging Reports Reviewed Today Include: CT head, CTA head/neck, MRI, Echo  Imaging Personally Reviewed by Myself Includes:  CT head, CTA head/neck, MRI, Echo    Recent Cultures (last 7 days):           Last 24 Hours Medication List:     Current Facility-Administered Medications:  amLODIPine 5 mg Oral Q12H Val Rodarte PA-C    aspirin 81 mg Oral Daily Val Rodarte PA-C    atorvastatin 80 mg Oral Daily With [employer] Linette Rodarte PA-C    carvedilol 6 25 mg Oral BID With Meals Summer HARRIS ALLISON Rodarte    cyanocobalamin 1,000 mcg Oral Daily Val Rodarte PA-C    diphenoxylate-atropine 1 tablet Oral 4x Daily PRN Val Rodarte PA-C    escitalopram 5 mg Oral Daily Val Rodarte PA-C    famotidine 20 mg Oral Daily Val Rodarte PA-C    gabapentin 600 mg Oral Daily Jessica Dailey MD    glucose 12 g Oral PRN Val Rodarte PA-C    heparin (porcine) 5,000 Units Subcutaneous Q8H [address] Val Rodarte PA-C    [START ON 10/30/2018] insulin glargine 35 Units Subcutaneous Daily Val Rodarte PA-C    insulin lispro 1-6 Units Subcutaneous TID AC Val Rodarte PA-C    insulin lispro 1-6 Units Subcutaneous HS Val Rodarte PA-C    insulin lispro 6 Units Subcutaneous TID With Meals Val Rodarte PA-C    lactulose 20 g Oral Daily PRN Val Rodarte PA-C    sodium chloride 75 mL/hr Intravenous Continuous Val Rodarte PA-C Last Rate: 75 mL/hr (10/29/18 0422)        Today, Patient Was Seen By: Donato Power PA-C    ** Please Note: Dictation voice to text software may have been used in the creation of this document   **

## 2018-10-29 NOTE — PLAN OF CARE
Activity Intolerance/Impaired Mobility     Mobility/activity is maintained at optimum level for patient Progressing        Communication Impairment     Ability to express needs and understand communication Progressing        Neurological Deficit     Neurological status is stable or improving Progressing        Nutrition     Nutrition/Hydration status is improving Progressing        Potential for Aspiration     Non-ventilated patient's risk of aspiration is minimized Progressing     Ventilated patient's risk of aspiration is minimized Progressing        Potential for Falls     Patient will remain free of falls Progressing

## 2018-10-29 NOTE — ASSESSMENT & PLAN NOTE
· POA, now resolved  Family reports diaphoresis and global weakness while at [employer]  Upon arrival to ED patient's R side was flaccid--- this lasted a short period of time and then patient's resolved to his baseline  · Today, patient has had no further episodes of altered mental status/focal weakness  · CT head/CTA in ED do not reveal acute/large changes compared to prior studies  · Discussed with neurology: DDx including new infarct (questionably related to embolic dz), seizure, TIA, etc  Question role of hypoglycemia  · Admit  · Telemetry  · Echo/MRI/EEG  · Echo: EF 55% with G1DD  · MRI: "No acute intracranial abnormality  No restricted diffusion to suggest acute ischemia  Old anterior left thalamic/hypothalamic lacunar infarct  Old left pontine lacunar infarct  Mild scattered periventricular and subcortical foci of white matter T2 hyperintensity which is nonspecific and most likely related to chronic small vessel ischemic changes  Other less likely etiologies include demyelinating disease, vasculitis, Lyme and migraines    · EEG pending  · Neurochecks  · Continue aspirin/statin-- per neuro: if MRI negative, will plavix load and maintain dual anti-platelet therapy Q23 days

## 2018-10-29 NOTE — UTILIZATION REVIEW
Thank you,  Parris Laran  Utilization Review Department  Phone: [phone]; Fax [phone]  ATTENTION: Please call with any questions or concerns to [phone]  and carefully follow the prompts so that you are directed to the right person  Send all requests for admission clinical reviews, approved or denied determinations and any other requests to fax [phone]  All voicemails are confidential    Initial Clinical Review    Admission: Date/Time/Statement: INPATIENT ADMISSION 10/28/18 1322    Orders Placed This Encounter   Procedures    Inpatient Admission (expected length of stay for this patient is greater than two midnights)     Standing Status:   Standing     Number of Occurrences:   1     Order Specific Question:   Admitting Physician     Answer:   Annamarie Rivera     Order Specific Question:   Level of Care     Answer:   Med Surg [16]     Order Specific Question:   Estimated length of stay     Answer:   More than 2 Midnights     Order Specific Question:   Certification     Answer:   I certify that inpatient services are medically necessary for this patient for a duration of greater than two midnights  See H&P and MD Progress Notes for additional information about the patient's course of treatment  ED: Date/Time/Mode of Arrival:   ED Arrival Information     Expected Arrival Acuity Means of Arrival Escorted By Service Admission Type    - 10/28/2018 10:58 Emergent Wheelchair Family Member General Medicine Emergency    Arrival Complaint    -          Chief Complaint:   Chief Complaint   Patient presents with   [address]     Pt  became unresponsive and weak in [place], pt difficulty speaking  Pt is flaccid on right side         History of Illness: 62 y o  male with past medical history significant for recent left-sided CVA with right-sided deficits, hypertension, hyperlipidemia, type 2 diabetes on insulin with hypoglycemia presents to the ED for evaluation of altered mental status and right-sided weakness and he  Family reports that patient was at [employer] when he started to have diaphoresis and was generally weak  The nurse at the patient discharged thought he was hypoglycemic, and give the patient some Coke in the donut  Patient did not immediately improved after drinking soda and having the donate  EMS was called  By the time the patient arrived in the ED, he was flaccid on the right hand side  ED Vital Signs:   ED Triage Vitals   Temperature Pulse Respirations Blood Pressure SpO2   10/28/18 1212 10/28/18 1104 10/28/18 1104 10/28/18 1104 10/28/18 1104   98 2 °F (36 8 °C) 58 15 154/77 96 %      Temp Source Heart Rate Source Patient Position - Orthostatic VS BP Location FiO2 (%)   10/28/18 1212 10/28/18 1104 10/28/18 1255 10/28/18 1104 --   Oral Monitor Sitting Left arm       Pain Score       10/28/18 1255       No Pain        Wt Readings from Last 1 Encounters:   10/28/18 106 kg (233 lb 11 oz)       Vital Signs (abnormal): 10/28/2018 BP: 173/81, 157/114, 176/100, 179/106    Abnormal Labs/Diagnostic Test Results: 10/28/2018  Lab Units 10/28/18  1112   SODIUM mmol/L 139   POTASSIUM mmol/L 4 0   CHLORIDE mmol/L 106   CO2 mmol/L 23   BUN mg/dL 32*   CREATININE mg/dL 2 32*   ANION GAP ISTAT mmol/L 14*   ANION GAP mmol/L 10   CALCIUM mg/dL 9 6   GLUCOSE, ISTAT mg/dl 91   CBC , INR normal, glucose 287  CTA head/neck:      Cerebral atrophy with chronic small vessel ischemic white matter disease and chronic left basal ganglia lacunar infarction    Truncation of the terminal left middle cerebral artery territory branches      Moderate stenosis of the proximal P1 segment of the left posterior cerebral artery  CT brain:   Decreased attenuation is noted in the supratentorial white matter demonstrating an appearance most consistent with moderate microangiopathic change  Chronic lacunar infarctions in the basal ganglia bilaterally as well as central julien          ED Treatment: Medication Administration from 10/28/2018 1058 to 10/28/2018 1514       Date/Time Order Dose Route Action Action by Comments     10/28/2018 1156 alteplase (ACTIVASE) bolus 9 mg 9 mg Intravenous Not Given Hawa Mitchell RN      10/28/2018 1156 alteplase (ACTIVASE) infusion 81 mg   Intravenous Not Given Hawa Mitchell RN      10/28/2018 1502 sodium chloride 0 9 % bolus 1,000 mL 0 mL Intravenous Stopped Mary Gaffney RN      10/28/2018 1145 sodium chloride 0 9 % bolus 1,000 mL 1,000 mL Intravenous New Bag Hawa Mitchell RN      10/28/2018 1219 aspirin tablet 325 mg 325 mg Oral Given Mary Gaffney RN           Past Medical/Surgical History:    Active Ambulatory Problems     Diagnosis Date Noted    Type 2 diabetes mellitus with hypoglycemia, with long-term current use of insulin ([facility] ) 02/26/2016    Mixed hyperlipidemia 02/26/2016    Benign hypertension with CKD (chronic kidney disease) stage III ([facility] ) 10/25/2016    Cerebrovascular accident (CVA) due to thrombosis of left middle cerebral artery ([facility] ) 07/29/2018    Cognitive impairment 08/03/2018    Elevated alkaline phosphatase level 08/03/2018    CKD (chronic kidney disease) 08/03/2018    Diabetic macular edema (HCC) 08/09/2018    GERD (gastroesophageal reflux disease) 08/13/2018    Cirrhosis [facility] ) 08/17/2016    Diabetic polyneuropathy associated with type 2 diabetes mellitus ([facility] ) 01/26/2016    Other constipation 08/17/2018    Abnormal EEG 09/24/2018    Cerebrovascular accident (CVA) ([facility] ) 10/04/2018    TIA (transient ischemic attack) 10/28/2018    Hypoglycemia, transient episode 10/28/2018       Past Medical History:   Diagnosis Date    Diabetes mellitus ([facility] )     Hypercholesteremia     Hyperlipidemia     Hypertension     Neuropathy     Obesity     [facility] )     PVC's (premature ventricular contractions)     Stroke [facility])        Admitting Diagnosis: TIA (transient ischemic attack) [G45 9]  [facility] (NIH) Stroke Scale level of consciousness score 0, alert; keenly responsive [Z78 9]  Cerebrovascular accident (CVA), unspecified mechanism ([facility] ) [I63 9]  Stage 3 chronic kidney disease ([facility] ) [N18 3]    Age/Sex: 62 y o  male    Assessment/Plan: 10/29/2018 Attending note:  Stroke-like symptoms, with right-sided weakness   Assessment & Plan     · POA, now resolved  Family reports diaphoresis and global weakness while at [employer]  Upon arrival to ED patient's R side was flaccid--- this lasted a short period of time and then patient's resolved to his baseline  · CT head/CTA in ED do not reveal acute/large changes compared to prior studies  · Discussed with neurology: DDx including new infarct (questionably related to embolic dz), seizure, TIA, etc  Question role of hypoglycemia  ? Admit  ? Telemetry  ? Echo/MRI/EEG  ? Neurochecks  ?  Continue aspirin/statin-- if MRI negative, will plavix load and maintain dual anti-platelet therapy E80 days      Type 2 diabetes mellitus with hypoglycemia, with long-term current use of insulin [facility])   Assessment & Plan             Lab Results   Component Value Date     HGBA1C 7 5 (H) 07/28/2018          Recent Labs      10/28/18   1100   POCGLU  75     Blood Sugar Average: Last 72 hrs:  (P) 75   · Last hemoglobin A1c from July indicate fair control of DM  · Patient has had 4 ED visits/hospitalizations for hypoglycemia and has had frequent insulin dose adjustments 2/2 hypoglycemia  · Patient's wife prepares his meals for him-- this AM for breakfast he had eggs and 2 pieces of toast and 2 cups of coffee with milk and sweet & low-- this is a normal breakfast for him   · Will repeat hemoglobin A1c as it has been 3 months since last test  · Decrease AM Lantus by 2 units (33U from 35) and decreased mealtime requirements by 1U (5U from 6U)  · QID BS checks  · Nutrition consult placed  · Endocrinology consult placed as well-- appreciate their recommendations   Benign hypertension with CKD (chronic kidney disease) stage III ([facility])   Assessment & Plan     · BP above goal upon admission  · Permissive hypertension  · Continue home BP meds  · Renal function appears to be around baseline-- per review of prior records, Cr has been between 2 2-2 6-- today 2 3  · Continue to monitor, avoid hypotension, repeat BMP in AM      Cerebrovascular accident (CVA) due to thrombosis of left middle cerebral artery ([facility] )   Assessment & Plan     · Occurred 3 months ago  · Patient with R sided deficits-- UE and LE weakness, wide-based gait, speech difficulties  · Follows with neurology as an outpatient  · Patient attends PT/OT/speech therapy as an outpatient    Diabetic polyneuropathy associated with type 2 diabetes mellitus ([facility] )   Assessment & Plan             Lab Results   Component Value Date     HGBA1C 7 5 (H) 07/28/2018           Recent Labs      10/28/18   1100  10/28/18   1631   POCGLU  75  287*     Blood Sugar Average: Last 72 hrs:  (P) 181   · With diabetic ulcer on ball of L foot-- no surrounding cellulitis and drainage is sero-sang  · Continue local wound care  · Follows podiatry as an outpatient    Mixed hyperlipidemia   Assessment & Plan     · Continue high-dose statin  · Repeat lipid panel pending   Other constipation   Assessment & Plan     · Patient has nerve stimulator-- currently turned OFF  · Continue stool softeners/laxatives         Admission Orders:  Scheduled Meds:   Current Facility-Administered Medications:  amLODIPine 5 mg Oral Q12H    aspirin 81 mg Oral Daily    atorvastatin 80 mg Oral Daily With Dinner    carvedilol 6 25 mg Oral BID With Meals    cyanocobalamin 1,000 mcg Oral Daily    diphenoxylate-atropine 1 tablet Oral 4x Daily PRN    escitalopram 5 mg Oral Daily    famotidine 20 mg Oral Daily    gabapentin 600 mg Oral Daily    glucose 12 g Oral PRN    heparin (porcine) 5,000 Units Subcutaneous Q8H [facility]    insulin glargine 33 Units Subcutaneous Daily    insulin lispro 5 Units Subcutaneous TID With Meals    lactulose 20 g Oral Daily PRN    sodium chloride 75 mL/hr Intravenous Continuous Last Rate: 75 mL/hr (10/29/18 0422)     Continuous Infusions:   sodium chloride 75 mL/hr Last Rate: 75 mL/hr (10/29/18 0422)     PRN Meds:   48 hr telemetry  Peripheral IV  NIH stroke scale assessment  Neuro checks Q 15 min then vitals & neuro checks  Q 1 hrx 4 hr; Q 2 hr x4 hr; Q 4 hr x72 hrs; vitals Q 8 if stable     EEG awake or drowsy  MRI brain   OT/PT eval & treat  HGBA1c w EAG estimation  ECHO complete w contrast if indicated  Nursing dysphagia assessment before diet  Regular dietconsistent CHO

## 2018-10-29 NOTE — DISCHARGE SUMMARY
Discharge Summary - [facility] Internal Medicine    Patient Information: Snow Dunbar 62 y o  male MRN: [id number]  Unit/Bed#: -47 Encounter: [id number]    Discharging Physician / Practitioner: Mitzi Trinidad PA-C  PCP: Fritz Garcia DO  Admission Date: 10/28/2018  Discharge Date: 10/29/18    Reason for Admission:  Right-sided weakness, stroke-like symptoms    Discharge Diagnoses:     Principal Problem:    Stroke-like symptoms, with right-sided weakness  Active Problems:    Type 2 diabetes mellitus with hypoglycemia, with long-term current use of insulin (HCC)    Benign hypertension with CKD (chronic kidney disease) stage III ([facility] )    Cerebrovascular accident (CVA) due to thrombosis of left middle cerebral artery [facility]    Diabetic polyneuropathy associated with type 2 diabetes mellitus ([facility] )    Mixed hyperlipidemia    Other constipation  Resolved Problems:    * No resolved hospital problems  *      Consultations During Hospital Stay:  · Neurology-- Dr Sandee Cabrera  · Endocrinology-- Dr Steffi De Santiago    Procedures Performed:   · CT stroke alert brain  · CTA stroke alert (head/neck)  · XR stroke alert portable chest  · MRI brain wo contrast  · Echo  · EEG    Significant Findings:   · CT stroke alert brain  · Cerebral atrophy with chronic small vessel ischemic white matter disease and chronic lacunar infarctions as described above  · CTA stroke alert (head/neck)  · 1  Cerebral atrophy with chronic small vessel ischemic white matter disease and chronic left basal ganglia lacunar infarction  2   Moderate atherosclerotic disease with moderate stenosis of the cavernous right internal carotid artery and severe stenosis of the intracranial left vertebral artery  No evidence of aneurysm, vascular malformation or vascular cut off  3   Truncation of the terminal left middle cerebral artery territory branches  No large vessel occlusion   4   Moderate stenosis of the proximal P1 segment of the left posterior cerebral artery  · XR stroke alert portable chest  · No acute cardiopulmonary disease  Findings are stable  · MRI brain wo contrast  · No acute intracranial abnormality  No restricted diffusion to suggest acute ischemia  Old anterior left thalamic/hypothalamic lacunar infarct  Old left pontine lacunar infarct  Mild scattered periventricular and subcortical foci of white matter T2 hyperintensity which is nonspecific and most likely related to chronic small vessel ischemic changes  Other less likely etiologies include demyelinating disease, vasculitis, Lyme and migraines  · Echo  · EF 55% with G1DD  · EEG  · This Routine EEG recorded during wakefulness, drowsiness, and sleep is abnormal  Background activities with intermittent medium amplitude delta activities in the left hemisphere and to a lesser extent in the right hemisphere suggests bilateral cerebral dysfunction, that is likely worse in the left hemisphere compared to the right  No epileptiform discharges were seen    Incidental Findings:   · Low blood sugar-- on arrival, BS was 75-- he had coke and a donut prior-- suspect he was profoundly hypoglycemic      Test Results Pending at Discharge (will require follow up):   · none     Outpatient Tests Requested:  · Neuropsych evaluation    Complications:  none    Hospital Course:     Milton Muniz is a 62 y o  male patient who originally presented to the hospital on 10/28/2018 due to stroke-like symptoms  Patient was at [employer] any suddenly became diaphoretic and globally weak  He did receive some snacks to help with blood sugar, but then he developed right-sided flaccidity that resolved after a few minutes  Given patient's significant history of CVA, he was admitted under stroke workup  He was maintained on telemetry, had echocardiogram, EEG, MRI    Patient was also found to be hypoglycemic upon arrival   This prompted endocrinologist evaluation of patient has had 4 hospitalizations/ER visits for hypoglycemia since his stroke in July  Neurology reviewed all imaging with patient and found no evidence of new acute infarct/stroke  They also do not find any seizure-like activity on EEG  They recommend outpatient follow with Neurology, and starting on Plavix until seen by neurologist   They also recommended outpatient neuropsych evaluation  Endocrinology also saw patient, and felt that the reason why he was having hypoglycemic episode was related to a carb insulin mismatch  Family reports he had been eating approximately 30-45 g of carbs per meal, but here he was eating closer to 60, therefore he did not have any further hypoglycemic episodes while inpatient  Endocrinology recommended decreasing Lantus from 35 units q a m  To 25 units q a m  Anabel Neely She also recommended maintaining NovoLog at 5 units with meals  She advised the family to not take mealtime insulin if blood sugar was less than 100  She also advised family to start sliding-scale insulin, stating that patient should have 1 additional unit of NovoLog for every 50 that patient's blood sugar is over goal of 150    Discussed all recommendations with family at bedside, they understand our recommendations are in agreement with plan  Patient will be discharged home and resume home physical therapy/occupational therapy/speech therapy  He will follow up with Endocrinology and Neurology as an outpatient  Patient was provided with a prescription of follow-up for neuropsychologic testing testing  Condition at Discharge: fair     Discharge Day Visit / Exam:     * Please refer to separate progress for these details *    Discharge instructions/Information to patient and family:   See after visit summary for information provided to patient and family  Provisions for Follow-Up Care:  See after visit summary for information related to follow-up care and any pertinent home health orders        Disposition:     Home    For Discharges to [facility] SNF:   · Not Applicable to this Patient - Not Applicable to this Patient    Planned Readmission:  None    Discharge Statement:  I spent 35 minutes discharging the patient  This time was spent on the day of discharge  I had direct contact with the patient on the day of discharge  Greater than 50% of the total time was spent examining patient, answering all patient questions, arranging and discussing plan of care with patient as well as directly providing post-discharge instructions  Additional time then spent on discharge activities  Discharge Medications:  See after visit summary for reconciled discharge medications provided to patient and family  ** Please Note: Dragon 360 Dictation voice to text software may have been used in the creation of this document   **

## 2018-10-29 NOTE — PROGRESS NOTES
Assisted patient to the bathroom and helped patient into bedside chair  No current complaints or concerns at this time  Chair locked; family at beside  Call bell is within reach  Will continue to monitor   Maury Manriquez RN

## 2018-10-29 NOTE — ASSESSMENT & PLAN NOTE
This am he has had no reoccurrence of his symptoms from yesterday  He is due to have his MRI at noon and his EEG this am    His echo is also pending  If MRI remarkable for ischemia, will need to consider further embolic work-up in setting of prior stroke  If MRI negative, would load with Plavix 600mg and maintain on dual antiplatelet (ASA 81 and Plavix 75) for 30 days as per POINT trial for high-risk TIA unless there is evidence of seizure or embolic source of stroke, in which case AED or anticoagulation would be indicated respectively  He has a December 26th appointment with Dr Radha Alonzo of our stroke team in the office  Depending on the outcome of this workup may need an appointment that is moved up potentially

## 2018-10-29 NOTE — ASSESSMENT & PLAN NOTE
His stroke in July of 2018 had as it is presenting symptoms right-sided deficits with language dysfunction somewhat similar to his presentation this time     I do not see that he had any hypoglycemia at that time although he has been hospitalized here at [facility] for 3 episodes of hypoglycemia all very briefly according to his family  After tPA in July he remained with a significant what appears to be a basal ganglia infarct on MRI he had improvement of his symptoms

## 2018-10-30 ENCOUNTER — TRANSITIONAL CARE MANAGEMENT (OUTPATIENT)
Dept: INTERNAL MEDICINE CLINIC | Facility: CLINIC | Age: 58
End: 2018-10-30

## 2018-10-31 ENCOUNTER — APPOINTMENT (OUTPATIENT)
Dept: OCCUPATIONAL THERAPY | Facility: CLINIC | Age: 58
End: 2018-10-31
Payer: COMMERCIAL

## 2018-10-31 ENCOUNTER — APPOINTMENT (OUTPATIENT)
Dept: PHYSICAL THERAPY | Facility: CLINIC | Age: 58
End: 2018-10-31
Payer: COMMERCIAL

## 2018-10-31 ENCOUNTER — APPOINTMENT (OUTPATIENT)
Dept: SPEECH THERAPY | Facility: CLINIC | Age: 58
End: 2018-10-31
Payer: COMMERCIAL

## 2018-11-04 NOTE — PROGRESS NOTES
Occupational Therapy Stroke Re-Evaluation:    Today's Date: 2018  Patient Name: Anita Owens  : 1960  MRN: [id number]  Referring Provider: Liliya Gutierrez MD  Dx: History of ischemic cerebrovascular accident (CVA) with residual deficit [I69 30]    Active Problem List:   Patient Active Problem List   Diagnosis    Type 2 diabetes mellitus with hypoglycemia, with long-term current use of insulin (HCC)    Mixed hyperlipidemia    Benign hypertension with CKD (chronic kidney disease) stage III ([facility] )    Cerebrovascular accident (CVA) due to thrombosis of left middle cerebral artery (HCC)    Cognitive impairment    Elevated alkaline phosphatase level    CKD (chronic kidney disease)    Diabetic macular edema (HCC)    GERD (gastroesophageal reflux disease)    Cirrhosis ([place] [address] )    Diabetic polyneuropathy associated with type 2 diabetes mellitus (HCC)    Other constipation    Abnormal EEG    Cerebrovascular accident (CVA) ([facility] )    TIA (transient ischemic attack)    Stroke-like symptoms, with right-sided weakness    Hypoglycemia, transient episode     Past Medical Hx:   Past Medical History:   Diagnosis Date    Diabetes mellitus ([place] [address] )     Hypercholesteremia     Hyperlipidemia     Hypertension     Neuropathy     Obesity     Osteomyelitis ([facility] )     last assessed 16    PVC's (premature ventricular contractions)     sees cardiology Dr Aishwarya camargo    Stroke [facility])     last weeof 2018 [facility]     Past Surgical Hx:   Past Surgical History:   Procedure Laterality Date    ABDOMINAL SURGERY      CHOLECYSTECTOMY      Percutaneous    OTHER SURGICAL HISTORY      "stimulator to control bowel movements"    RI ESOPHAGOGASTRODUODENOSCOPY TRANSORAL DIAGNOSTIC N/A 2016    Procedure: ESOPHAGOGASTRODUODENOSCOPY (EGD); Surgeon: Cecille Bansal MD;  [facility]: [facility];   Service: Gastroenterology    RI LAP,CHOLECYSTECTOMY N/A 2016    Procedure: Ova Lair CHOLECYSTECTOMY ;  Surgeon: Karen Quezada DO;  [facility];  Service: General    ROTATOR CUFF REPAIR Right     TOE AMPUTATION Right 10/28/2016    Procedure: 3RD TOE AMPUTATION ;  Surgeon: Lisa Gold DPM;  Location[facility];  Service:       Pain Levels:   Restin    With Activity:  0    Subjective/Patient Goal: "To keep going"    History of Present Illness:  Pt is a pleasant, active, employed full time 62 y  o  male seen for OT eval s/p referred to [address] [place] [address] s/p adm to [facility] 2018-2018 s/p R facial droop, stroke alert called, administered TPA in ED, managed in ICU, no ICH noted, MRIB + Recent ischemia, without hemorrhage in the left posterior perforating substance affecting hypothalamus, anterolateral thalamus, and genu of the left internal capsule, f/u CTB: Expected evolution of the known ischemic infarct in the left basal ganglion since prior CT   No hemorrhagic conversion   No new ischemic infarcts are seen   No new intraparenchymal hemorrhages are seen   No hydrocephalus, adm to [facility] 2018-2018, now referred for outpt OT/PT, dx'd w/ L BG CVA, CKD, diabetic macular edema, elevated alkaline phosphatase level, cognitive impairment, neuropathy, HTN, HLD, comorbidities as listed above       Pt seen for OT re-evaluation/progress note/status update on 2* 10/15/208 2* admitted [facility] [address]  [facility] - diagnosed w/ HTN, HLD, cognitive impairment and CKD III  Now seen for additional OT re-evaluation/progress note/status update 2* adm to [facility] from 10/28-10/29/2018 w/ R sided weakness, stroke like s/s, ultimately dx'd w/ r/o TIA/CVA, DMII, CKDIII, prior CVA, HLD, diabetic polyneuropathy, MRIB (-) No acute intracranial abnormality  No restricted diffusion to suggest acute ischemia  Old anterior left thalamic/hypothalamic lacunar infarct    Old left pontine lacunar infarct      Lifestyle Performance Model:  Autonomy: Pt was I w/  I/ADLS, drove, & required no use of DME PTA, since CVA was min A for ADLS, A for IADLS, has not returned to driving and required use of RW,   Reciprocal Relationships: Supportive wife and 4 children living 16, 25, 32, and 30  Service to Others: Pt is retired , also worked for [employer]  Intrinsic Gratification: Enjoys nothing outside of working going out to eat, going shopping, [employer] on Sunday  Home Setup: Pt lives in a 1 story handicapped accessible apartment w/ first floor setup w/ 0 RAFAT in [place] off [address]     Objective  Impairments Section:   UE Strength:   MARIEFR: RUE: 40/200 LUE: 45/200   PINCER: 3 point pinch: RUE 10, LUE: 10   2 point pinch: RUE: 9, LUE: 9   Lateral pincher: RUE: 10, LUE: 10    Coordination:   9 HOLE PEG TEST:     RUE: 42 2 seconds, LUE: 34 2 Seconds    Range of Motion:  AROM/PROM: RUE mild ataxia apraxia dysmetria dysdiadochokinesia though improved, persistent impaired prehension patterns w/ decreased automacity    Sensation:  MYOFILAMENTS:              RUE: 4 31              LUE: 4 31    Visual Perceptual and Functional Cognition:  1  Cognitive Checklist:  *Patient indicated that he is experiencing the following symptoms:    · Memory: Sequencing activities and Learning new things    · Attention: Keeping attention during a conversation, Focusing or concentrating on a specific task, Sustaining attention on a task and Dividing your attention (i e , multi-tasking)    · Processing: Processing new information  and Responding to questions in a timely manner    · Executive Functions: Self-correcting or recognizing mistakes    · Communication: Word finding in conversation, Expressing thoughts and ideas fluently, Expressing thoughts and ideas into writing and Understanding others' non-verbal cues    · Visual: Losing spot on the page when reading and Misspelling while texting/email    · Emotional: Monitoring mood, Increased anxiety and Keeping cognitive and physical pace    · Increased Sensitivities to: Noise     3  Víctor Cognitive Assessment Version 8 1 (MoCA V8 1): Pt completed MoCA V8 1 on I E  And scored 15/30 indicative of moderate neurocognitive impairments  SLP addressing cog/memory/language therefore defer  4  Vision Screening Recording Form:   vision screen: + glasses @ all times present for vision screen  near acuity: R 20/30, L 20/30  binocularity far: B/L exophoria  binocularity near: B/L exophoria  red green fusion: no PLRG  near point of convergence: diplopia   enrico string: misalignment  Pursuits: slightly jerky pursuits in all planes  Saccades: inaccurate in all planes  ocular ROM: intact/full  visual perceptual midline shift: shifted to the L of midline and @ horizon    5  Anxiety/Depression:  Pt engaged in the Neuro QOL Anxiety Short Form and Neuro QOL Depression Short Form in order to screen for anxiety and depressive s/s  Pt reported the following:  Anxiety- Short Form:   --used to measure anxious s/s  For scoring purposes, scores of 25+ indicate the threshold for treatment per neurology/SLIM  Score:23/40  Pt most often chose the response rarely when asked about feeling anxious s/s  Depression- Short Form:   --used to measure depressive s/s  For scoring purposes, scores of 25+ indicate the threshold for treatment per neurology/SLIM  Score:12/40  Pt most often chose the response never when asked about feeling depressive s/s  Assessment/Plan  Occupational Therapy Skilled Analysis Assessment and Plan of Care:  Pt requires overall mod I for ADLs/self care and mod I for fx'l mobility w/ RW   Pt is currently demonstrating the following occupational deficits: limited 2* diplopia w/ binocular vision, R esophoria, L eye droop, L eye exophoric strabismus at rest, PLRG @ 12", no diplopia/fusion for point of convergence, misalignment, jerky pursuits, inaccurate saccades, confused, disoriented, impulsive, distractible, poor problem solving and sequencing, poor safety insight judgment and awareness into deficits, poor attention/concentration to task, requiring max cues to redirect and additional time for delayed processing, decreased STM/immediate delayed recall, decreased working memory, memory retention, dysarthria, facial droop, decreased EF skills, follows simple one step verbal concrete commands w/ max cues for carryover, difficulty w/ pacing/planning, R handed, macrographia, RUE hemiparesis w/ pronator drift w/ hypotonicity proximal>distal, apraxia, ataxia, dysmetria, dysdiadochokinesia, impaired FMC/FMS/GMC/GMS, impaired tactile/proprioceptive kinesthetic sensory input  The following Occupational Performance Areas to address include: grooming, bathing/shower, toilet hygiene, dressing, medication management, socialization, health maintenance, functional mobility, community mobility, clothing management, cleaning, meal prep, money management, household maintenance, care of children, care of pets, job performance/volunteering and social participation  Based on the aforementioned OT evaluation, functional performance deficits, and assessments, pt has been identified as a high complexity evaluation  Pt to continue to benefit from outpatient skilled OT services to address the following goals 2x/wk to  w/in 4 weeks with special focus on self-care management, pt education, RUE coordination/strengthening as well as motor training to improve above deficits and enhance overall QOL/function  Defer language to SLP, vision to vision therapy/neuro optometrist, OT to focus on gross cog/memory, attention/concentration, and RUE function  Pt seen for OT re-eval/progress note/status update today 10/15/18   Pt still demonstrated the following occupational deficits: limited 2* B/L exophoria, diplopia @ 12", misalignment, slightly jerky pursuits, inaccurate saccades in all planes, shifted to the L of midline and above horizon,impulsive, distractible, poor problem solving and sequencing, poor safety insight judgment and awareness into deficits, poor attention/concentration to task, requiring max cues to redirect and additional time for delayed processing, decreased STM/immediate delayed recall, decreased working memory, memory retention, dysarthria, facial droop, decreased EF skills, follows simple one step verbal concrete commands w/ max cues for carryover, difficulty w/ pacing/planning, R handed, RUE mild ataxia apraxia dysmetria dysdiadochokinesia though improved, persistent impaired prehension patterns w/ decreased automacity  Continued OT services are recommended to work on UE strengthening/coordination, FMS/FMC/GMS/GMC, insight, judgement, safety awareness, and bilateral UE integrative tasks  Defer vision to neuro optometry and vision therapy      Goals:  Short Term Goals:  Modalities:  · Pt will tolerate BIONESS for improved motor and sensory performance for overall improved hand to target with 80% accuracy 4 weeks - PARTIALLY MET  Coordination:  · Pt will increase prehension patterns for improved tripod with utensil management with <20% droppage 4 weeks - PARTIALLY MET  · Pt will increase proprioception of  R hand to target for improved functional reach vision occluded with ADL tasks 4 weeks - PARTIALLY MET  · Pt will increase automaticity of RUE  to 75% for improved grasp release of tabletop items for improved functional performance with salient and fx'l I/ADL/leisure tasks 4 weeks - PARTIALLY MET  ROM/Function:  · Pt will increase R  UE to Gross Assist, refined functional assist, and stabilization with <20% cuing for tabletop tasks 4 weeks for improved functional performance of life roles and salient tasks 4 weeks - PARTIALLY MET  · Pt will demo with G carryover of Home Exercise Program to improve functional progression towards goals in Plan of care and for improved functional use of  RUE 4 weeks - MET  Cognition:  · Pt will increase attention to 2+ tasks for improved role performance (once returned) and engagement in salient tasks 4 weeks as applicable - PARTIALLY MET  · Pt will increase insight into deficits for improved carryover of recommendations, accommodations, improved rate of healing 4 weeks - NOT MET  Long Term Goals:  Coordination:  · Decrease ataxia with functional reach for normalized movement pattern of  RUE  - PARTIALLY MET  · Increase automaticity of  RUE to 100% for resumption of B integrative tasks - PARTIALLY MET  ROM/Function:  · Increase func mobs from various surfaces to Mod I/ I with least restrictive device and good safety t/o I/ADL/leisure tasks - PARTIALLY MET  · Resume hand dominance to refined mod I functional assist with all I/ADL/leisure/salient tasks (including driving if applicable) - PARTIALLY MET  · Pt will increase B/L UE strength to 5/5 and  strength, through the use of strengthening exercises and home program for eventual return to driving PRN - PARTIALLY MET  · Pt will increase FMC to Mod I with ADL fasteners for increased independence - PARTIALLY MET  Cognition:  · Pt will increase attention to 3+ tasks for improved divided attention with occupational roles and pre driving roles as applicable - PARTIALLY MET  · Pt will demo with decreased anxiety and frustration for improved insight into stroke process and rate of recovery - PARTIALLY MET     INTERVENTION COMMENTS:  Diagnosis: L BG CVA, CKD, diabetic macular edema, elevated alkaline phosphatase level, cognitive impairment, neuropathy, HTN, HLD  Precautions: HTN  FOTO: 90 with 10% limitation  Insurance: [address] [place] [address] [[id number]]  18 of 24 visits, PN due 12/5/2018    Thank you for the consult!   Please call if you have any questions: [phone]  Abelino Botello, OTMACHO, OTR/L, C-BHARATI, CSRS  Director of Outpatient Neuro Occupational Therapy

## 2018-11-05 ENCOUNTER — APPOINTMENT (OUTPATIENT)
Dept: SPEECH THERAPY | Facility: CLINIC | Age: 58
End: 2018-11-05
Payer: COMMERCIAL

## 2018-11-05 ENCOUNTER — EVALUATION (OUTPATIENT)
Dept: OCCUPATIONAL THERAPY | Facility: CLINIC | Age: 58
End: 2018-11-05
Payer: COMMERCIAL

## 2018-11-05 ENCOUNTER — EVALUATION (OUTPATIENT)
Dept: SPEECH THERAPY | Facility: CLINIC | Age: 58
End: 2018-11-05
Payer: COMMERCIAL

## 2018-11-05 ENCOUNTER — EVALUATION (OUTPATIENT)
Dept: PHYSICAL THERAPY | Facility: CLINIC | Age: 58
End: 2018-11-05
Payer: COMMERCIAL

## 2018-11-05 DIAGNOSIS — R48.8 OTHER SYMBOLIC DYSFUNCTIONS: Primary | ICD-10-CM

## 2018-11-05 DIAGNOSIS — I69.30 HISTORY OF ISCHEMIC CEREBROVASCULAR ACCIDENT (CVA) WITH RESIDUAL DEFICIT: Primary | ICD-10-CM

## 2018-11-05 DIAGNOSIS — Z74.09 IMPAIRED FUNCTIONAL MOBILITY, BALANCE, AND ENDURANCE: ICD-10-CM

## 2018-11-05 DIAGNOSIS — I63.9 ISCHEMIC CEREBROVASCULAR ACCIDENT (CVA) (HCC): ICD-10-CM

## 2018-11-05 DIAGNOSIS — I63.312 CEREBROVASCULAR ACCIDENT (CVA) DUE TO THROMBOSIS OF LEFT MIDDLE CEREBRAL ARTERY (HCC): ICD-10-CM

## 2018-11-05 DIAGNOSIS — I63.9 CEREBROVASCULAR ACCIDENT (CVA), UNSPECIFIED MECHANISM (HCC): Primary | ICD-10-CM

## 2018-11-05 PROCEDURE — G8984 CARRY CURRENT STATUS: HCPCS

## 2018-11-05 PROCEDURE — 97530 THERAPEUTIC ACTIVITIES: CPT

## 2018-11-05 PROCEDURE — 97112 NEUROMUSCULAR REEDUCATION: CPT | Performed by: PHYSICAL THERAPIST

## 2018-11-05 PROCEDURE — 92507 TX SP LANG VOICE COMM INDIV: CPT

## 2018-11-05 PROCEDURE — G9163 LANG EXPRESS GOAL STATUS: HCPCS

## 2018-11-05 PROCEDURE — 97110 THERAPEUTIC EXERCISES: CPT | Performed by: PHYSICAL THERAPIST

## 2018-11-05 PROCEDURE — G8985 CARRY GOAL STATUS: HCPCS

## 2018-11-05 PROCEDURE — G9162 LANG EXPRESS CURRENT STATUS: HCPCS

## 2018-11-05 NOTE — PROGRESS NOTES
Speech-Language Pathology Re-Evaluation    Today's date: 2018  Patients name: Darinel Simon  : 1960  MRN: [id number]  Safety measures: CVA  Referring provider: Krista Gaucher, MD    Subjective comments: Pt was recently admitted to [facility] on 10/28/2018 due to stroke-like symptoms w/ R sided weakness, stroke like s/s, ultimately dx'd w/ r/o TIA/CVA  See below  MRI 10/29/2018: "No acute intracranial abnormality  No restricted diffusion to suggest acute ischemia  Old anterior left thalamic/hypothalamic lacunar infarct  Old left pontine lacunar infarct    Mild scattered periventricular and subcortical foci of white matter T2 hyperintensity which is nonspecific and most likely related to chronic small vessel ischemic changes  Other less likely etiologies include demyelinating disease, vasculitis, Lyme and migraines "     CTA (H/N) 10/28/2018: IMPRESSIONS- " 1  Cerebral atrophy with chronic small vessel ischemic white matter disease and chronic left basal ganglia lacunar infarction  2   Moderate atherosclerotic disease with moderate stenosis of the cavernous right internal carotid artery and severe stenosis of the intracranial left vertebral artery  No evidence of aneurysm, vascular malformation or vascular cut off  3   Truncation of the terminal left middle cerebral artery territory branches  No large vessel occlusion  4   Moderate stenosis of the proximal P1 segment of the left posterior cerebral artery "    Patient's goal(s): "I want you to help me to get the words done and everything else I'll be alright "     Assessments    The Emeka & Emeka, is a widely used neuropsychological assessment tool to measure confrontational word retrieval in individuals with aphasia or other language disturbance caused by stroke, Alzheimer's disease, or other dementing disorder  NAMING:    -[place] Naming Test - long        Goals    Short-term goals:    1   Patient will be educated on oral motor exercises and provided an appropriate at home exercise program to be practiced to facilitate improved strength and function for increased speech intelligibility  To be achieved in 4-6 weeks  --goal being d/c at this time      2  Patient will be educated on word finding strategies (i e , circumlocution) for improved generative naming and verbal expression skills  --PARTIALLY MET     3  Patient will complete concrete and abstract categorization tasks to 80% accuracy to facilitate improved generative naming skills and working memory, to be achieved in 4-6 weeks  --PARTIALLY MET   Pt was read aloud three items and was asked to provide one additional item in the concrete category (i e  Strawberry, orange, plum, ___)  Task completed indep in 15/25 opp, increasing to 21/25 with mod verbal semantic cues  4  Patient will be educated on the use of internal and external memory aids and compensatory strategies with 80% accuracy to facilitate increased recall of routine, personal information, and recent events, to be achieved in 4-6 weeks  --PARTIALLY MET   With pictures of family members, pt able to name family member on picture independently in22/24 opp, quickly self-correcting to 19/21 opp  5  Patient will complete auditory immediate and short term memory tasks to 80% accuracy to facilitate increased ability to retell narratives and recall information within functional living environment, to be achieved in 4-6 weeks  --PARTIALLY MET   -Pt was read aloud short stories (1-3 sentences) and then answered Y/N questions  Task completed in 28/30 opp  6  Patient will provide an adequate response to generative naming task (i e , name as many) with 80% accuracy to facilitate increased expressive language skills, to be achieved in 4-6 weeks  --PARTIALLY MET     7   Patient will provide an adequate response to confrontation naming task (i e , what is) with 80% accuracy to facilitate increased expressive language skills, to be achieved in 4-6 weeks  --PARTIALLY MET     8  Patient will name up to 5 features to describe a person/place/thing with 80% accuracy to facilitate improved utilization of circumlocution strategy, to be achieved in 4-6 weeks  --PARTIALLY MET     9  Patient will complete picture description tasks using language organization strategies with 80% accuracy to facilitate improved utilization of circumlocution strategy, to be achieved in 4-6 weeks  --d/c goal at this time  10  Patient will complete reading comprehension tasks (e g , answering questions, following complex written directions, etc ) to 80% accuracy to facilitate carryover of comprehension of functional reading materials, to be achieved in 4-6 weeks  --d/c goal at this time  Long-Term Goals:     1  Patient will develop functional, cognitive-linguistic based skills and utilize compensatory strategies as needed to communicate effectively to different conversational partners, maintain safety, and participate socially by discharge  --PARTIALLY MET   2  Patient will demonstrate improved expressive and receptive language skills during structured and unstructured tasks by discharge  --PARTIALLY MET   3  Patient will independently utilize speech intelligibility strategies (e g , over-exaggeration, slow rate, breath groups, etc ) during oral reading tasks >90% intelligibility to facilitate increased generalization of skills into conversational speech by discharge   --goal being d/c at this time           Functional Limitations Reporting (G-codes):   Flowsheet Rows      Most Recent Value   SLP G-Codes   Assessment Type  Re-evaluation   Functional Limitations  Spoken language expressive   Spoken Language Expression Current Status ()  CJ   Spoken Language Expression Goal Status ()  CI            Impressions/Recommendations    Impressions: Patient continues to present with moderate speech/language deficits s/p CVA and recent hospital admission c/b aphasia including difficulty with word finding in both conversational speech and picture naming tasks  Pt appears to also have significant cog-linguistic deficits c/b poor short/term and immediate memory, reduced thought processing speed, and thought organization  Pt does not appear to have regressed since hospitalization but continues to show the aforementioned deficits  Pt has continued make some progress in ST regarding memory and word finding deficits, however unsure if strategies are being carried over  He seems to have reduced insight to his deficits and is frequently impulsive  If pt does not make significant progress by next re-evaluation, pt will be then d/c  Until then, Ann Chi will continue targeting short-term/immediate memory and word finding  Pt will continue to see OT and PT in OP services  Per IP note, pt received a script recommending he see neuropsych  Will continue to follow  Recommendations:  -Patient would benefit from outpatient skilled Speech Therapy services : Speech/ language therapy and Cognitive-Linguistic therapy    -Frequency: 2x weekly  -Duration: 4-6 weeks    -Intervention certification from: 23/8/1235  -Intervention certification to: 76/75/1155    Visit Tracking:   -Referring provider: EPIC   -Billing guidelines: AMA  -Visit #18/24   -[facility]  ([place]-no auth;BOMN     [facility]-auth post 24 visits)   -RE due 12/17/2018

## 2018-11-05 NOTE — PROGRESS NOTES
Daily Note     Today's date: 2018  Patient name: Ana Mitchell  : 1960  MRN: [id number]  Referring provider: Karen Muñoz MD  Dx:   Encounter Diagnosis     ICD-10-CM    1  Cerebrovascular accident (CVA), unspecified mechanism ([facility] [address] ) I63 9    2  Impaired functional mobility, balance, and endurance Z74 09                   Subjective: Pt was hospitalized 10/28-10/29 due to R sided weakness, stroke like s/s, but imaging found no evidence of new acute infarct/stroke  They also do not find any seizure-like activity on EEG   was also recommended outpatient neuropsych evaluation        Objective: See treatment diary below     Balance Test     6 Minute Walk Test (ft): : 400ft w/ RW at 3 5 min, unsafe turns    2 Minute Walk Test (ft):    Gait Speed (ft/s): 7 sec, 0 87 m/s, Prev = 0 65 m/s w/ RW    5x Sit To Stand (s): : 11 6 sec    TU sec, prev = 28 sec w/ RW; 20 sec with RW 10/2   BURGER/5: 42/56    MCTSIB  FTEO, firm - 30 sec  FTEC, firm - 16 sec    LE strength: /5 bilaterally    Precautions: FALL risk, DM II, HTN, spinal stimulator (no stim), CDK, poor safety awareness, *Fear of Dogs*     Daily Treatment Diary      Manual                                                                                                                                                      Exercise Diary  10/22 10/25 11/5      Nustep 10 min L5 10 min L5 L6, 10 min       STS 2x10 2x10       Static balance           Ambulation           Semi tandem      30" x 2      Sidestepping  3 laps no UE  1# x 2 laps   1 lap no weight        Alternating step taps           Weaving in between cones            Stepping over hurdles     Fw/lat 2 laps ea       Marching 3 laps no UE 3 laps no UE       Step ups  fwd/lat 2x10 ea   fwd/lat 2x10 ea         backwards walking           Ambulation with 360 deg             hurdles 2 laps fwd/lat ea no ue  3 laps fwd/lat no ue 3 laps fwd/lat no ue      SLS     30" x 2                                                             Modalities                                                                                                           Assessment: Re-eval performed today  No decline in function noted per today re-eval from recent hospitalization and bilateral LE strength is [facility]  However pt has only made minor improvements in BURGER and is still a high fall risk, mostly due to his impulsivity and poor safety awareness/insight  Endurance continues to be a limiting factor and question pt's motivation to participate in therapy  Discussed with pt continuing for 2-4 weeks to maximize independence with HEP and improve safety and then likely discharge by end of month  STG - 4 weeks  1  BURGER > 37/56 - met   2  TUG < 20 sec - met   3  5 x STS < 12 sec - met  4  Complete 6 MWT - not MET (3 5 min only today as of 11/5)   5  Distant supervision during ambulation with RW and transfers - not met (close supervision)     LTG - 8 weeks  1  BURGER > or = 45/56 - progressing  2  Ambulate with no AD safely within household distances  - progressing  3   SSV = or > 0 8 m/s - met   4  Maintain balance up to 30 seconds in first two conditions of MCTSIB (FTEO/EC firm and foam) - progressing  5  Independent with updated HEP within 4 weeks - new as of 11/5      Plan: Continue PT for additional 2-4 weeks

## 2018-11-07 ENCOUNTER — OFFICE VISIT (OUTPATIENT)
Dept: SPEECH THERAPY | Facility: CLINIC | Age: 58
End: 2018-11-07
Payer: COMMERCIAL

## 2018-11-07 ENCOUNTER — OFFICE VISIT (OUTPATIENT)
Dept: OCCUPATIONAL THERAPY | Facility: CLINIC | Age: 58
End: 2018-11-07
Payer: COMMERCIAL

## 2018-11-07 ENCOUNTER — OFFICE VISIT (OUTPATIENT)
Dept: PHYSICAL THERAPY | Facility: CLINIC | Age: 58
End: 2018-11-07
Payer: COMMERCIAL

## 2018-11-07 DIAGNOSIS — Z74.09 IMPAIRED FUNCTIONAL MOBILITY, BALANCE, AND ENDURANCE: ICD-10-CM

## 2018-11-07 DIAGNOSIS — R48.8 OTHER SYMBOLIC DYSFUNCTIONS: Primary | ICD-10-CM

## 2018-11-07 DIAGNOSIS — I63.9 ISCHEMIC CEREBROVASCULAR ACCIDENT (CVA) (HCC): ICD-10-CM

## 2018-11-07 DIAGNOSIS — I63.9 CEREBROVASCULAR ACCIDENT (CVA), UNSPECIFIED MECHANISM (HCC): Primary | ICD-10-CM

## 2018-11-07 DIAGNOSIS — I69.30 HISTORY OF ISCHEMIC CEREBROVASCULAR ACCIDENT (CVA) WITH RESIDUAL DEFICIT: Primary | ICD-10-CM

## 2018-11-07 DIAGNOSIS — R41.89 COGNITIVE IMPAIRMENT: ICD-10-CM

## 2018-11-07 PROCEDURE — 97110 THERAPEUTIC EXERCISES: CPT

## 2018-11-07 PROCEDURE — 97112 NEUROMUSCULAR REEDUCATION: CPT

## 2018-11-07 PROCEDURE — 92507 TX SP LANG VOICE COMM INDIV: CPT

## 2018-11-07 PROCEDURE — 97140 MANUAL THERAPY 1/> REGIONS: CPT

## 2018-11-07 NOTE — PROGRESS NOTES
Daily Speech Treatment Note    Today's date: 2018  Patients name: Keyla Bermudez  : 1960  MRN: [id number]  Safety measures: CVA   Referring provider: Jesica De León MD    Primary Diagnosis/Billing code: Y93 4  Secondary Diagnosis/ Billing code: I63 9, I69 322     Visit Tracking:  -Referring provider: EPIC   -Billing guidelines: AMA  -Visit #   -[employer]  [facility]  (Marium-no auth;BOMN    [facility]-auth post 24 visits)   -RE due 11/15/2018    Subjective/Behavioral:    "That's a lot of words "     Objective/Assessment:  Did not bring HEP  Short-term goals:   2  Patient will be educated on word finding strategies (i e , circumlocution) for improved generative naming and verbal expression skills  3  Patient will complete concrete and abstract categorization tasks to 80% accuracy to facilitate improved generative naming skills and working memory, to be achieved in 4-6 weeks  Difficulty chunking into three categories during STM activity  4  Patient will be educated on the use of internal and external memory aids and compensatory strategies with 80% accuracy to facilitate increased recall of routine, personal information, and recent events, to be achieved in 4-6 weeks  Pt was re-educated on chunking, repeat and rehearse, and visualization strategy  See goal 5 for implementation  5  Patient will complete auditory immediate and short term memory tasks to 80% accuracy to facilitate increased ability to retell narratives and recall information within functional living environment, to be achieved in 4-6 weeks  -Pt was provided with 12 pictures in which he used "chunking" strategy to categorize into 3 different groups  Pt was then asked to use "repeat and rehearse" and "visualization" strategy to recall pictures  Immediate recall completed in  opp; increasing to 10/12 with mod verbal semantic cues  -Word/Mental Picture Associations:  To target working memory, patient was trained to associate paired words  Verbal format: "When I say ____, you will say ____"  Task completed over 1 trials (total 10 words)  Immediate recall of word lists completed in 5/10 opp  Delayed recall of word lists completed in 2/10 opp  -Pt recalled names of family members in pictures in 25/27 opp; quickly self-correcting to 27/27 opp  6  Patient will provide an adequate response to generative naming task (i e , name as many) with 80% accuracy to facilitate increased expressive language skills, to be achieved in 4-6 weeks  7  Patient will provide an adequate response to confrontation naming task (i e , what is) with 80% accuracy to facilitate increased expressive language skills, to be achieved in 4-6 weeks  8  Patient will name up to 5 features to describe a person/place/thing with 80% accuracy to facilitate improved utilization of circumlocution strategy, to be achieved in 4-6 weeks  Plan:   -Patient was provided with home exercises/activities to target goals in plan of care at the end of today's session   -Continue with current plan of care

## 2018-11-07 NOTE — PROGRESS NOTES
Daily Note     Today's date: 2018  Patient name: Ana Mitchell  : 1960  MRN: [id number]  Referring provider: Karen Muñoz MD  Dx:   Encounter Diagnosis   Name Primary?  History of ischemic cerebrovascular accident (CVA) with residual deficit Yes                  Subjective: "This tape is really hurting me today, can you take it off"  Objective: See treatment diary below  Pt participated in skilled OT focusing on BUE strengthening, endurance and FM coordination w/prehension patterns  Pt engaged in UEB w/o resistance for 5 min prograde/5 min retrograde  Pt completed peg pattern copy with focus on pad pinch and dynamic stabilization in forward reach  UE ranger in stance focusng on FF against gravity  Assessment: Tolerated treatment fair  Pt tolerated UEB with rest break, encouragement and affirmation needed for direction follow of peg pattern, min noted droppage for peg insertion into mat  Pt completed FF in stance using UE ranger, 3x10 demo G postural control and control of RUE     Patient would benefit from continued OT      Plan: Continued skilled OT per POC    INTERVENTION COMMENTS:  Diagnosis: L BG CVA, CKD, diabetic macular edema, elevated alkaline phosphatase level, cognitive impairment, neuropathy, HTN, HLD  Precautions: HTN  FOTO: 90 with 10% limitation  InsuranceAlba Lee  14 of 24 visits, PN due 2018

## 2018-11-07 NOTE — PROGRESS NOTES
Daily Note     Today's date: 2018  Patient name: Darinel Simon  : 1960  MRN: [id number]  Referring provider: Krista Gaucher, MD  Dx:   Encounter Diagnosis     ICD-10-CM    1  Cerebrovascular accident (CVA), unspecified mechanism ([facility] [address] ) I63 9    2  Impaired functional mobility, balance, and endurance Z74 09          Subjective: Patient noted no new complaints  Objective: See treatment diary below      Assessment: Tolerated treatment fair  Patient needed VC to perform side stepping over alexander with correct form  Added 2 # weights to side stepping  Patient demonstrated fatigue post treatment and would benefit from continued PT      Plan: Continue per plan of care        Precautions: FALL risk, DM II, HTN, spinal stimulator (no stim), CDK, poor safety awareness, *Fear of Dogs*     Daily Treatment Diary      Manual                                                                                                                                                      Exercise Diary  10/22 10/25 11/5  11/7       Nustep 10 min L5 10 min L5 L6, 10 min   L6,10 min       STS 2x10 2x10    2x10        Static balance               Ambulation               Semi tandem      30" x 2  30" x 2       Sidestepping  3 laps no UE  1# x 2 laps   1 lap no weight     2# weights 3 laps       Alternating step taps        20x 6inch       Weaving in between cones                Stepping over hurdles     Fw/lat 2 laps ea   fwd /lat 3 laps        Marching 3 laps no UE 3 laps no UE    3 laps no UE       Step ups  fwd/lat 2x10 ea   fwd/lat 2x10 ea     fwd/ lat 2x10         backwards walking               Ambulation with 360 deg                 hurdles 2 laps fwd/lat ea no ue  3 laps fwd/lat no ue 3 laps fwd/lat no ue  3 laps fwd/lat no ue       SLS     30" x 2  30"x2                                                              Modalities

## 2018-11-09 ENCOUNTER — TELEPHONE (OUTPATIENT)
Dept: NEUROLOGY | Facility: CLINIC | Age: 58
End: 2018-11-09

## 2018-11-09 NOTE — TELEPHONE ENCOUNTER
Pts son called today inquiring about release of health information for his dad's disability case I spoke with son and stated that we had not received anything  Son stated that they had sent it twice to us and that he had even filled out a release of health twice and mailed it back to us and we have no documentation of this in pts chart   Son requested that I mail out release of health again and I will be doing so today

## 2018-11-12 ENCOUNTER — OFFICE VISIT (OUTPATIENT)
Dept: SPEECH THERAPY | Facility: CLINIC | Age: 58
End: 2018-11-12
Payer: COMMERCIAL

## 2018-11-12 ENCOUNTER — OFFICE VISIT (OUTPATIENT)
Dept: OCCUPATIONAL THERAPY | Facility: CLINIC | Age: 58
End: 2018-11-12
Payer: COMMERCIAL

## 2018-11-12 ENCOUNTER — OFFICE VISIT (OUTPATIENT)
Dept: PHYSICAL THERAPY | Facility: CLINIC | Age: 58
End: 2018-11-12
Payer: COMMERCIAL

## 2018-11-12 DIAGNOSIS — R48.8 OTHER SYMBOLIC DYSFUNCTIONS: Primary | ICD-10-CM

## 2018-11-12 DIAGNOSIS — I69.30 HISTORY OF ISCHEMIC CEREBROVASCULAR ACCIDENT (CVA) WITH RESIDUAL DEFICIT: Primary | ICD-10-CM

## 2018-11-12 DIAGNOSIS — I63.9 ISCHEMIC CEREBROVASCULAR ACCIDENT (CVA) (HCC): ICD-10-CM

## 2018-11-12 DIAGNOSIS — Z74.09 IMPAIRED FUNCTIONAL MOBILITY, BALANCE, AND ENDURANCE: Primary | ICD-10-CM

## 2018-11-12 DIAGNOSIS — I63.9 CEREBROVASCULAR ACCIDENT (CVA), UNSPECIFIED MECHANISM (HCC): ICD-10-CM

## 2018-11-12 DIAGNOSIS — R41.89 COGNITIVE IMPAIRMENT: ICD-10-CM

## 2018-11-12 PROCEDURE — 92507 TX SP LANG VOICE COMM INDIV: CPT

## 2018-11-12 PROCEDURE — 97110 THERAPEUTIC EXERCISES: CPT

## 2018-11-12 PROCEDURE — 97140 MANUAL THERAPY 1/> REGIONS: CPT

## 2018-11-12 PROCEDURE — 97112 NEUROMUSCULAR REEDUCATION: CPT

## 2018-11-12 NOTE — PROGRESS NOTES
Daily Note     Today's date: 2018  Patient name: Natalee Escobedo  : 1960  MRN: [id number]  Referring provider: Jaquelin Wells MD  Dx:   Encounter Diagnosis     ICD-10-CM    1  Impaired functional mobility, balance, and endurance Z74 09    2  Cerebrovascular accident (CVA), unspecified mechanism ([facility] [address] ) I63 9                   Subjective: Patient noted that he doesn't feel well today and is fatigued pre treatment  Objective: See treatment diary below      Assessment: Due to patient not feeling well and fatigue treatment was shortened today resume full treatment nv  Tolerated treatment fair  Patient continued to need VC for side stepping and lateral step ups  Patient would benefit from continued PT      Plan: Continue per plan of care           Precautions: FALL risk, DM II, HTN, spinal stimulator (no stim), CDK, poor safety awareness, *Fear of Dogs*       Manual                                                                                                                                                      Exercise Diary  10/22 10/25 11/5  11/7  11/12     Nustep 10 min L5 10 min L5 L6, 10 min   L6,10 min  L6, 8 min      STS 2x10 2x10    2x10   2x10     Static balance               Ambulation               Semi tandem      30" x 2  30" x 2       Sidestepping  3 laps no UE  1# x 2 laps   1 lap no weight     2# weights 3 laps       Alternating step taps        20x 6inch       Weaving in between cones                Stepping over hurdles     Fw/lat 2 laps ea   fwd /lat 3 laps    fwd /lat 3 laps      Marching 3 laps no UE 3 laps no UE    3 laps no UE  3 laps no UE     Step ups  fwd/lat 2x10 ea   fwd/lat 2x10 ea     fwd/ lat 2x10   fwd/ lat 2x10       backwards walking               Ambulation with 360 deg                 hurdles 2 laps fwd/lat ea no ue  3 laps fwd/lat no ue 3 laps fwd/lat no ue  3 laps fwd/lat no ue       SLS     30" x 2  30"x2                                                            Modalities

## 2018-11-12 NOTE — PROGRESS NOTES
Daily Note     Today's date: 2018  Patient name: Dioni Casanova  : 1960  MRN: [id number]  Referring provider: Sita Hines MD  Dx:   Encounter Diagnosis   Name Primary?  History of ischemic cerebrovascular accident (CVA) with residual deficit Yes                  Subjective: "I can't do anymore  I'm tired today "      Objective: See treatment below  UEB bilaterally 5min prograde, 5min retrograde against 1 5# resistance for BUE endurance, downgrading to no resistance due to fatigue in BUEs today, with rest breaks x3  IASTM and manual STM to R upper and middle traps and around scapular border for tone reduction and sensory re-ed  Seated mixed motor patterns [id number] RUE using small green 1# theraball followed by writing alphabet in the air with RUE in FF, both tasks for proximal endurance/strength and dynamic stability  Pipe Tree seated at edge of mat table utilizing BUEs with midline crossing and FF/downward reach to retrieve pieces from 6in steps on B/L sides of pt, and placing onto table ahead to improve tripod prehension, R hand to target accuracy, grasp/release, and proximal RUE dynamic stability in forward reach  Assessment: Tolerated treatment well  Pt demo mod fatigue during TherEx, requiring frequent rest breaks  Pt required frequent verbal and tactile cues during mixed motor patterns and alphabet task for elbow ext and positioning in space  Noted with high tone in R trap/scap area with min improvement post IASTM/STM  Min difficulty with divided attention during Pipe Tree, requiring mod verbal cues to find pieces from both steps on floor  Plan: Continue skilled OT per POC        INTERVENTION COMMENTS:  Diagnosis: History of ischemic cerebrovascular accident (CVA) with residual deficit [I69 30]  Precautions: HTN  FOTO: 90 with 10% limitation  [facility] [[id number]]  69 TV 32 [id number], PN due 2018

## 2018-11-12 NOTE — PROGRESS NOTES
Daily Speech Treatment Note    Today's date: 2018  Patients name: Godfrey Henry  : 1960  MRN: [id number]  Safety measures: CVA   Referring provider: Mendel Blamer, MD    Primary Diagnosis/Billing code: R93 0  Secondary Diagnosis/ Billing code: I63 9, I69 322     Visit Tracking:  -Referring provider: EPIC   -Billing guidelines: AMA  -Visit #   -[employer]  [facility]  ([place]-no auth;BOMN    [facility]-auth post 24 visits)   -RE due 2018    Subjective/Behavioral:    "That's an [place]?"     Objective/Assessment:  Did not bring HEP  Short-term goals:   2  Patient will be educated on word finding strategies (i e , circumlocution) for improved generative naming and verbal expression skills  3  Patient will complete concrete and abstract categorization tasks to 80% accuracy to facilitate improved generative naming skills and working memory, to be achieved in 4-6 weeks  Pt was provided with a written list of 16 words and asked to organize into 4 categories  Task completed over 2 trials  Able to complete with mod verbal cues  4  Patient will be educated on the use of internal and external memory aids and compensatory strategies with 80% accuracy to facilitate increased recall of routine, personal information, and recent events, to be achieved in 4-6 weeks  5  Patient will complete auditory immediate and short term memory tasks to 80% accuracy to facilitate increased ability to retell narratives and recall information within functional living environment, to be achieved in 4-6 weeks   -Word/Mental Picture Associations: To target working memory, patient was trained to associate paired words  Verbal format: "When I say ____, you will say ____"  Task completed over 1 trials (total 10 words)  Immediate recall of word lists completed in 6/10 opp  Delayed recall of word lists completed in 6/10 opp       6  Patient will provide an adequate response to generative naming task (i e , name as many) with 80% accuracy to facilitate increased expressive language skills, to be achieved in 4-6 weeks  Pt was provided a concrete category and asked to list 3 members in each  Task completed in 38/52 opp; increasing to 47/52 opp with mod verbal semantic cues  7  Patient will provide an adequate response to confrontation naming task (i e , what is) with 80% accuracy to facilitate increased expressive language skills, to be achieved in 4-6 weeks  On ipad [employer] tosin, pt named 32/50 pictures independently, increased to 46/50 with min verbal semantic and phonemic cues  8  Patient will name up to 5 features to describe a person/place/thing with 80% accuracy to facilitate improved utilization of circumlocution strategy, to be achieved in 4-6 weeks  Plan:   -Patient was provided with home exercises/activities to target goals in plan of care at the end of today's session   -Continue with current plan of care

## 2018-11-14 ENCOUNTER — OFFICE VISIT (OUTPATIENT)
Dept: SPEECH THERAPY | Facility: CLINIC | Age: 58
End: 2018-11-14
Payer: COMMERCIAL

## 2018-11-14 ENCOUNTER — APPOINTMENT (OUTPATIENT)
Dept: OCCUPATIONAL THERAPY | Facility: CLINIC | Age: 58
End: 2018-11-14
Payer: COMMERCIAL

## 2018-11-14 ENCOUNTER — APPOINTMENT (OUTPATIENT)
Dept: PHYSICAL THERAPY | Facility: CLINIC | Age: 58
End: 2018-11-14
Payer: COMMERCIAL

## 2018-11-14 DIAGNOSIS — I63.9 CEREBROVASCULAR ACCIDENT (CVA), UNSPECIFIED MECHANISM (HCC): ICD-10-CM

## 2018-11-14 DIAGNOSIS — I12.9 BENIGN HYPERTENSION WITH CKD (CHRONIC KIDNEY DISEASE) STAGE III (HCC): Primary | ICD-10-CM

## 2018-11-14 DIAGNOSIS — E11.649 TYPE 2 DIABETES MELLITUS WITH HYPOGLYCEMIA WITHOUT COMA, WITH LONG-TERM CURRENT USE OF INSULIN (HCC): ICD-10-CM

## 2018-11-14 DIAGNOSIS — N18.30 BENIGN HYPERTENSION WITH CKD (CHRONIC KIDNEY DISEASE) STAGE III (HCC): Primary | ICD-10-CM

## 2018-11-14 DIAGNOSIS — R48.8 OTHER SYMBOLIC DYSFUNCTIONS: Primary | ICD-10-CM

## 2018-11-14 DIAGNOSIS — N18.30 STAGE 3 CHRONIC KIDNEY DISEASE (HCC): ICD-10-CM

## 2018-11-14 DIAGNOSIS — Z79.4 TYPE 2 DIABETES MELLITUS WITH HYPOGLYCEMIA WITHOUT COMA, WITH LONG-TERM CURRENT USE OF INSULIN (HCC): ICD-10-CM

## 2018-11-14 DIAGNOSIS — R41.89 COGNITIVE IMPAIRMENT: ICD-10-CM

## 2018-11-14 DIAGNOSIS — I63.9 ISCHEMIC CEREBROVASCULAR ACCIDENT (CVA) (HCC): ICD-10-CM

## 2018-11-14 PROCEDURE — 92507 TX SP LANG VOICE COMM INDIV: CPT

## 2018-11-14 PROCEDURE — G9163 LANG EXPRESS GOAL STATUS: HCPCS

## 2018-11-14 PROCEDURE — G9164 LANG EXPRESS D/C STATUS: HCPCS

## 2018-11-14 NOTE — TELEPHONE ENCOUNTER
Patients wife called asking for a referral for Dr Lina Clarke - Nephrology  Patient has appt tomorrow      Please fax referral to [phone]

## 2018-11-14 NOTE — PROGRESS NOTES
Speech-Language Pathology Discharge    Today's date: 2018  Patients name: Breonna Grove  : 1960  MRN: [id number]  Safety measures: CVA, fall risk, depression   Referring provider: Giuliano Woods MD    Visit Tracking:   -Referring provider: EPIC   -Billing guidelines: AMA  -Visit #   [employer]  [facility]  ([place]-no auth;BOMN    [facility]-auth post 24 visits)   -RE due 2018    Subjective comments: "Oh that's it!" referring to the correct answer that was provided by the clinician during a word-finding task  Patient's goal(s): "I want you to help me to get the words done and everything else I'll be alright "     Assessments    Did not formally re-assess today using standardized measures  See goals commented on below  Goals    Short-term goals:   2  Patient will be educated on word finding strategies (i e , circumlocution) for improved generative naming and verbal expression skills  --PARTIALLY MET   To target semantic association and lexicon building, patient asked to name an opposite/antonym of an abstract word (i e , miserly)  Task completed in 3/25 opp overall, increased to 15/25 with mod verbal cues  3  Patient will complete concrete and abstract categorization tasks to 80% accuracy to facilitate improved generative naming skills and working memory, to be achieved in 4-6 weeks  --PARTIALLY MET     4  Patient will be educated on the use of internal and external memory aids and compensatory strategies with 80% accuracy to facilitate increased recall of routine, personal information, and recent events, to be achieved in 4-6 weeks  --PARTIALLY MET     5  Patient will complete auditory immediate and short term memory tasks to 80% accuracy to facilitate increased ability to retell narratives and recall information within functional living environment, to be achieved in 4-6 weeks  --PARTIALLY MET   -Word/Mental Picture Associations:  To target working memory, patient was trained to associate paired words  Verbal format: "When I say ____, you will say ____"  Task completed over 1 trials (total 10 words)  Immediate recall of word lists completed in 0/10 opp  Delayed recall of word lists completed in 1/10 opp  *Noted this is a significant decrease from previous session  6  Patient will provide an adequate response to generative naming task (i e , name as many) with 80% accuracy to facilitate increased expressive language skills, to be achieved in 4-6 weeks  --MET     7  Patient will provide an adequate response to confrontation naming task (i e , what is) with 80% accuracy to facilitate increased expressive language skills, to be achieved in 4-6 weeks  --PARTIALLY MET     8  Patient will name up to 5 features to describe a person/place/thing with 80% accuracy to facilitate improved utilization of circumlocution strategy, to be achieved in 4-6 weeks  --PARTIALLY MET   Pt able to name 5 features of given word with mod verbal cues today in 6/6 opp  Demonstrating difficulty coming up with descriptions indep  Long-Term Goals:     1  Patient will develop functional, cognitive-linguistic based skills and utilize compensatory strategies as needed to communicate effectively to different conversational partners, maintain safety, and participate socially by discharge  --PARTIALLY MET   2  Patient will demonstrate improved expressive and receptive language skills during structured and unstructured tasks by discharge    --PARTIALLY MET         Functional Limitations Reporting (G-codes):   Flowsheet Rows      Most Recent Value   SLP G-Codes   Assessment Type  Discharge   Functional Limitations  Spoken language expressive   Spoken Language Expression Goal Status ()  CI   Spoken Language Expression Discharge Status ()  CJ            Impressions/Recommendations    Impressions: Patient continues to p/w mod speech and language deficits s/p "stroke-like symptoms" from his most recent admission to [facility] and post CVA prior c/b difficulty with word-finding in both conversational speech and picture naming tasks  Pt also continues to present with cognitive-linguistic deficits c/b short/term and immediate memory, reduced thought processing speed, and thought organization  Initially at the beginning of therapy, pt appeared motivated to come to therapy, although frequently did not demonstrate carryover of learned skills  Pt has been re-educated multiple times on internal and external memory aids as well as word-finding strategies, although it appears that pt has been having difficulty holding on to learned strategies from session to session  Pt has been provided with homework each session, however has rarely returned it back to therapy  Pt was provided a HEP for dysarthria exercises after IE, but pt's slurred speech and mod dysarthria still continues to be a barrier to pt's fluent and intelligible speech  Currently, pt has demonstrated a decrease in therapeutic immediate and delayed recall memory tasks, which is a concern  His lack of motivation and depression remains a barrier to pt's progress  At this time, it is recommended that pt discontinue with ST 2/2 his lack of motivation and carryover, as well as his mental state  There is no functional progress being made  It is recommended that he follows-up with neuropsych (referral is in Epic)  Pt has been provided with a memory book, dysarthria exercises, and a HEP to encourage independent work if pt begins to feel motivated again  Pt is to be d/c  If pt would like to come back to therapy he will need a new script  Recommendations:  -Patient to be discharged from outpatient skilled Speech Therapy services: Patient to be discharged to an independent Home Exercise Program  Patient has achieved maximum potential  Patient is not meting goals in plan of care due lack of compliance with recommendations/HEP     -It is recommended that pt follow-up with neuropsych as previously recommended by other physicians and SLP previously

## 2018-11-15 NOTE — PROGRESS NOTES
Pt was placed in a cast by podiatry on LLE, requested to be placed on HOLD for OT/PT for 30 days, will continue to follow SLP  Future appointments will be removed  Should pt call back w/ regression of function, recommend call to schedule appts w/in 30 days  If pt does not call back w/in 30 days, will D/C from OT caseload  Pt will require a script to re-engage in OT after 30 days from this date (11/15/2018)  Pt and wife aware and agreeable  Thank you for the consult!   Please call if you have any questions: [phone]  Mark Cam, OTD, OTR/L, C-GCM, CSRS  Director of Outpatient Neuro Occupational Therapy

## 2018-11-19 ENCOUNTER — OFFICE VISIT (OUTPATIENT)
Dept: PHYSICAL THERAPY | Facility: CLINIC | Age: 58
End: 2018-11-19
Payer: COMMERCIAL

## 2018-11-19 ENCOUNTER — APPOINTMENT (OUTPATIENT)
Dept: OCCUPATIONAL THERAPY | Facility: CLINIC | Age: 58
End: 2018-11-19
Payer: COMMERCIAL

## 2018-11-19 ENCOUNTER — APPOINTMENT (OUTPATIENT)
Dept: SPEECH THERAPY | Facility: CLINIC | Age: 58
End: 2018-11-19
Payer: COMMERCIAL

## 2018-11-20 ENCOUNTER — TELEPHONE (OUTPATIENT)
Dept: INTERNAL MEDICINE CLINIC | Facility: CLINIC | Age: 58
End: 2018-11-20

## 2018-11-20 NOTE — TELEPHONE ENCOUNTER
Patients wife states they did not get the test strips yet, fax from  pharmacy states that they need a prior auth

## 2018-11-21 ENCOUNTER — APPOINTMENT (OUTPATIENT)
Dept: PHYSICAL THERAPY | Facility: CLINIC | Age: 58
End: 2018-11-21
Payer: COMMERCIAL

## 2018-11-21 ENCOUNTER — APPOINTMENT (OUTPATIENT)
Dept: OCCUPATIONAL THERAPY | Facility: CLINIC | Age: 58
End: 2018-11-21
Payer: COMMERCIAL

## 2018-11-21 ENCOUNTER — APPOINTMENT (OUTPATIENT)
Dept: SPEECH THERAPY | Facility: CLINIC | Age: 58
End: 2018-11-21
Payer: COMMERCIAL

## 2018-11-21 RX ORDER — DEXTROSE 4 G
TABLET,CHEWABLE ORAL
Refills: 0 | COMMUNITY
Start: 2018-08-15 | End: 2019-07-10 | Stop reason: HOSPADM

## 2018-11-21 RX ORDER — RANIBIZUMAB 6 MG/ML
INJECTION, SOLUTION INTRAVITREAL
COMMUNITY
Start: 2018-09-11 | End: 2020-01-16

## 2018-11-21 NOTE — TELEPHONE ENCOUNTER
I'm not sure which meter/supplies is covered    I ordered one touch meter in august and accu-chek meter/supplies last month    Can pt's wife contact insurance to find out which meter/supplies are covered & let us know?     thx

## 2018-11-26 ENCOUNTER — TELEPHONE (OUTPATIENT)
Dept: INTERNAL MEDICINE CLINIC | Facility: CLINIC | Age: 58
End: 2018-11-26

## 2018-11-26 ENCOUNTER — APPOINTMENT (OUTPATIENT)
Dept: SPEECH THERAPY | Facility: CLINIC | Age: 58
End: 2018-11-26
Payer: COMMERCIAL

## 2018-11-26 ENCOUNTER — APPOINTMENT (OUTPATIENT)
Dept: OCCUPATIONAL THERAPY | Facility: CLINIC | Age: 58
End: 2018-11-26
Payer: COMMERCIAL

## 2018-11-26 ENCOUNTER — APPOINTMENT (OUTPATIENT)
Dept: PHYSICAL THERAPY | Facility: CLINIC | Age: 58
End: 2018-11-26
Payer: COMMERCIAL

## 2018-11-26 DIAGNOSIS — E78.2 MIXED HYPERLIPIDEMIA: ICD-10-CM

## 2018-11-26 DIAGNOSIS — G45.9 TIA (TRANSIENT ISCHEMIC ATTACK): ICD-10-CM

## 2018-11-26 DIAGNOSIS — E11.649 TYPE 2 DIABETES MELLITUS WITH HYPOGLYCEMIA WITHOUT COMA, WITH LONG-TERM CURRENT USE OF INSULIN (HCC): ICD-10-CM

## 2018-11-26 DIAGNOSIS — N18.30 BENIGN HYPERTENSION WITH CKD (CHRONIC KIDNEY DISEASE) STAGE III (HCC): ICD-10-CM

## 2018-11-26 DIAGNOSIS — Z79.4 TYPE 2 DIABETES MELLITUS WITH HYPOGLYCEMIA WITHOUT COMA, WITH LONG-TERM CURRENT USE OF INSULIN (HCC): ICD-10-CM

## 2018-11-26 DIAGNOSIS — I63.312 CEREBROVASCULAR ACCIDENT (CVA) DUE TO THROMBOSIS OF LEFT MIDDLE CEREBRAL ARTERY (HCC): Primary | ICD-10-CM

## 2018-11-26 DIAGNOSIS — I63.9 CEREBROVASCULAR ACCIDENT (CVA), UNSPECIFIED MECHANISM (HCC): ICD-10-CM

## 2018-11-26 DIAGNOSIS — I12.9 BENIGN HYPERTENSION WITH CKD (CHRONIC KIDNEY DISEASE) STAGE III (HCC): ICD-10-CM

## 2018-11-26 NOTE — TELEPHONE ENCOUNTER
Patient's  wife went to pharmacy to pick accu check test strips, they would not give to her they said they needed a provision from the Doctor  Wife also wants to move  [facility] for Therapy

## 2018-11-26 NOTE — TELEPHONE ENCOUNTER
Spoke with pharmacy who states they are not sure what pts insurance does cover- pt needs to contact insurance company  Spoke with pts wife who states she will call insurance now to find out       Wife also states she wants to move  to [place] for Therapy

## 2018-11-26 NOTE — TELEPHONE ENCOUNTER
This was already addressed  Alan Pizano Please see tele enc from 11/20      pls call pharmacy and see what is the issue or what meter/supplies are covered by the insurance

## 2018-11-27 ENCOUNTER — TELEPHONE (OUTPATIENT)
Dept: INTERNAL MEDICINE CLINIC | Facility: CLINIC | Age: 58
End: 2018-11-27

## 2018-11-27 DIAGNOSIS — Z79.4 TYPE 2 DIABETES MELLITUS WITH HYPOGLYCEMIA WITHOUT COMA, WITH LONG-TERM CURRENT USE OF INSULIN (HCC): Primary | ICD-10-CM

## 2018-11-27 DIAGNOSIS — E11.649 TYPE 2 DIABETES MELLITUS WITH HYPOGLYCEMIA WITHOUT COMA, WITH LONG-TERM CURRENT USE OF INSULIN (HCC): Primary | ICD-10-CM

## 2018-11-27 DIAGNOSIS — E11.65 TYPE 2 DIABETES MELLITUS WITH HYPERGLYCEMIA, UNSPECIFIED WHETHER LONG TERM INSULIN USE (HCC): Chronic | ICD-10-CM

## 2018-11-27 RX ORDER — LANCETS 33 GAUGE
EACH MISCELLANEOUS 3 TIMES DAILY
Qty: 270 EACH | Refills: 1 | Status: SHIPPED | OUTPATIENT
Start: 2018-11-27

## 2018-11-27 RX ORDER — BLOOD-GLUCOSE METER
KIT MISCELLANEOUS 3 TIMES DAILY
Qty: 1 EACH | Refills: 0 | Status: SHIPPED | OUTPATIENT
Start: 2018-11-27 | End: 2020-01-15

## 2018-11-27 NOTE — TELEPHONE ENCOUNTER
Patient's wife called [facility] the equipment that is covered is :    One [address]  said they pay 100%    Test strips are  One touch [place]?  These are the test strips for the machine

## 2018-11-28 ENCOUNTER — APPOINTMENT (OUTPATIENT)
Dept: OCCUPATIONAL THERAPY | Facility: CLINIC | Age: 58
End: 2018-11-28
Payer: COMMERCIAL

## 2018-11-28 ENCOUNTER — APPOINTMENT (OUTPATIENT)
Dept: PHYSICAL THERAPY | Facility: CLINIC | Age: 58
End: 2018-11-28
Payer: COMMERCIAL

## 2018-11-28 ENCOUNTER — APPOINTMENT (OUTPATIENT)
Dept: SPEECH THERAPY | Facility: CLINIC | Age: 58
End: 2018-11-28
Payer: COMMERCIAL

## 2018-12-03 ENCOUNTER — APPOINTMENT (OUTPATIENT)
Dept: PHYSICAL THERAPY | Facility: CLINIC | Age: 58
End: 2018-12-03
Payer: COMMERCIAL

## 2018-12-03 ENCOUNTER — APPOINTMENT (OUTPATIENT)
Dept: OCCUPATIONAL THERAPY | Facility: CLINIC | Age: 58
End: 2018-12-03
Payer: COMMERCIAL

## 2018-12-03 ENCOUNTER — APPOINTMENT (OUTPATIENT)
Dept: SPEECH THERAPY | Facility: CLINIC | Age: 58
End: 2018-12-03
Payer: COMMERCIAL

## 2018-12-05 ENCOUNTER — APPOINTMENT (OUTPATIENT)
Dept: OCCUPATIONAL THERAPY | Facility: CLINIC | Age: 58
End: 2018-12-05
Payer: COMMERCIAL

## 2018-12-05 ENCOUNTER — APPOINTMENT (OUTPATIENT)
Dept: PHYSICAL THERAPY | Facility: CLINIC | Age: 58
End: 2018-12-05
Payer: COMMERCIAL

## 2018-12-05 ENCOUNTER — APPOINTMENT (OUTPATIENT)
Dept: SPEECH THERAPY | Facility: CLINIC | Age: 58
End: 2018-12-05
Payer: COMMERCIAL

## 2018-12-07 DIAGNOSIS — E11.649 TYPE 2 DIABETES MELLITUS WITH HYPOGLYCEMIA WITHOUT COMA, WITH LONG-TERM CURRENT USE OF INSULIN (HCC): ICD-10-CM

## 2018-12-07 DIAGNOSIS — Z79.4 TYPE 2 DIABETES MELLITUS WITH HYPOGLYCEMIA WITHOUT COMA, WITH LONG-TERM CURRENT USE OF INSULIN (HCC): ICD-10-CM

## 2018-12-11 ENCOUNTER — TELEPHONE (OUTPATIENT)
Dept: ENDOCRINOLOGY | Facility: CLINIC | Age: 58
End: 2018-12-11

## 2018-12-11 ENCOUNTER — APPOINTMENT (OUTPATIENT)
Dept: PHYSICAL THERAPY | Facility: CLINIC | Age: 58
End: 2018-12-11
Payer: COMMERCIAL

## 2018-12-11 ENCOUNTER — APPOINTMENT (OUTPATIENT)
Dept: OCCUPATIONAL THERAPY | Facility: CLINIC | Age: 58
End: 2018-12-11
Payer: COMMERCIAL

## 2018-12-11 ENCOUNTER — APPOINTMENT (OUTPATIENT)
Dept: SPEECH THERAPY | Facility: CLINIC | Age: 58
End: 2018-12-11
Payer: COMMERCIAL

## 2018-12-11 DIAGNOSIS — E11.65 UNCONTROLLED TYPE 2 DIABETES MELLITUS WITH HYPERGLYCEMIA (HCC): Primary | ICD-10-CM

## 2018-12-12 NOTE — TELEPHONE ENCOUNTER
Lissette, we don't typically order labs on patients until they are established in the office  If they do not present as our patient, it was all for nasimon

## 2018-12-12 NOTE — TELEPHONE ENCOUNTER
Noted  Didn't realize he had never been seen here previously  Seems like he was seen by endo during his hospitalization  Thanks      Lissette Brennan PA-C

## 2018-12-13 ENCOUNTER — APPOINTMENT (OUTPATIENT)
Dept: PHYSICAL THERAPY | Facility: CLINIC | Age: 58
End: 2018-12-13
Payer: COMMERCIAL

## 2018-12-13 ENCOUNTER — APPOINTMENT (OUTPATIENT)
Dept: SPEECH THERAPY | Facility: CLINIC | Age: 58
End: 2018-12-13
Payer: COMMERCIAL

## 2018-12-13 ENCOUNTER — APPOINTMENT (OUTPATIENT)
Dept: OCCUPATIONAL THERAPY | Facility: CLINIC | Age: 58
End: 2018-12-13
Payer: COMMERCIAL

## 2018-12-17 ENCOUNTER — APPOINTMENT (OUTPATIENT)
Dept: OCCUPATIONAL THERAPY | Facility: CLINIC | Age: 58
End: 2018-12-17
Payer: COMMERCIAL

## 2018-12-17 ENCOUNTER — APPOINTMENT (OUTPATIENT)
Dept: SPEECH THERAPY | Facility: CLINIC | Age: 58
End: 2018-12-17
Payer: COMMERCIAL

## 2018-12-17 ENCOUNTER — APPOINTMENT (OUTPATIENT)
Dept: PHYSICAL THERAPY | Facility: CLINIC | Age: 58
End: 2018-12-17
Payer: COMMERCIAL

## 2018-12-17 DIAGNOSIS — E11.42 DIABETIC POLYNEUROPATHY ASSOCIATED WITH TYPE 2 DIABETES MELLITUS (HCC): ICD-10-CM

## 2018-12-17 RX ORDER — GABAPENTIN 250 MG/5ML
600 SOLUTION ORAL
Qty: 470 ML | Refills: 2 | Status: SHIPPED | OUTPATIENT
Start: 2018-12-17 | End: 2019-03-07 | Stop reason: SDUPTHER

## 2018-12-20 ENCOUNTER — APPOINTMENT (OUTPATIENT)
Dept: SPEECH THERAPY | Facility: CLINIC | Age: 58
End: 2018-12-20
Payer: COMMERCIAL

## 2018-12-20 ENCOUNTER — APPOINTMENT (OUTPATIENT)
Dept: OCCUPATIONAL THERAPY | Facility: CLINIC | Age: 58
End: 2018-12-20
Payer: COMMERCIAL

## 2018-12-20 ENCOUNTER — APPOINTMENT (OUTPATIENT)
Dept: PHYSICAL THERAPY | Facility: CLINIC | Age: 58
End: 2018-12-20
Payer: COMMERCIAL

## 2018-12-20 DIAGNOSIS — E78.2 MIXED HYPERLIPIDEMIA: ICD-10-CM

## 2018-12-20 DIAGNOSIS — I69.30 HISTORY OF ISCHEMIC CEREBROVASCULAR ACCIDENT (CVA) WITH RESIDUAL DEFICIT: ICD-10-CM

## 2018-12-20 DIAGNOSIS — N18.30 STAGE 3 CHRONIC KIDNEY DISEASE (HCC): ICD-10-CM

## 2018-12-20 DIAGNOSIS — I63.9 ISCHEMIC CEREBROVASCULAR ACCIDENT (CVA) (HCC): ICD-10-CM

## 2018-12-20 RX ORDER — ATORVASTATIN CALCIUM 80 MG/1
80 TABLET, FILM COATED ORAL
Qty: 90 TABLET | Refills: 1 | Status: SHIPPED | OUTPATIENT
Start: 2018-12-20 | End: 2019-06-10 | Stop reason: SDUPTHER

## 2018-12-24 ENCOUNTER — APPOINTMENT (OUTPATIENT)
Dept: PHYSICAL THERAPY | Facility: CLINIC | Age: 58
End: 2018-12-24
Payer: COMMERCIAL

## 2018-12-24 ENCOUNTER — APPOINTMENT (OUTPATIENT)
Dept: SPEECH THERAPY | Facility: CLINIC | Age: 58
End: 2018-12-24
Payer: COMMERCIAL

## 2018-12-24 ENCOUNTER — APPOINTMENT (OUTPATIENT)
Dept: OCCUPATIONAL THERAPY | Facility: CLINIC | Age: 58
End: 2018-12-24
Payer: COMMERCIAL

## 2018-12-26 ENCOUNTER — OFFICE VISIT (OUTPATIENT)
Dept: NEUROLOGY | Facility: CLINIC | Age: 58
End: 2018-12-26
Payer: COMMERCIAL

## 2018-12-26 VITALS
SYSTOLIC BLOOD PRESSURE: 138 MMHG | DIASTOLIC BLOOD PRESSURE: 70 MMHG | HEART RATE: 73 BPM | HEIGHT: 68 IN | BODY MASS INDEX: 35.95 KG/M2 | WEIGHT: 237.2 LBS

## 2018-12-26 DIAGNOSIS — I65.23 CAROTID ATHEROSCLEROSIS, BILATERAL: ICD-10-CM

## 2018-12-26 DIAGNOSIS — E78.2 MIXED HYPERLIPIDEMIA: ICD-10-CM

## 2018-12-26 DIAGNOSIS — Z86.73 HISTORY OF STROKE: ICD-10-CM

## 2018-12-26 DIAGNOSIS — E11.649 TYPE 2 DIABETES MELLITUS WITH HYPOGLYCEMIA WITHOUT COMA, WITH LONG-TERM CURRENT USE OF INSULIN (HCC): Primary | ICD-10-CM

## 2018-12-26 DIAGNOSIS — Z79.4 TYPE 2 DIABETES MELLITUS WITH HYPOGLYCEMIA WITHOUT COMA, WITH LONG-TERM CURRENT USE OF INSULIN (HCC): Primary | ICD-10-CM

## 2018-12-26 PROCEDURE — 99215 OFFICE O/P EST HI 40 MIN: CPT | Performed by: PSYCHIATRY & NEUROLOGY

## 2018-12-26 NOTE — PATIENT INSTRUCTIONS
Stroke:  Shobha Parents presents with this family for follow-up with regard to his prior stroke  At this point in time he continues to have some weakness on his right hand side, he also continues to have some issues with his speech  He only has limited insight into his difficulties  Reportedly he does have, even prior to his stroke, some trouble with thinking and memory which may be playing a role here  His family reports that he has some outbursts of laughter which seem out of context/inappropriate, as well as some laughter during conversation when he seems to be searching for words  He has expressed interest in a return to driving  He takes all his medications as prescribed and they did not describe any significant bleeding or bruising issues  He is having some issue with early morning awakenings, approximately 0500 am on a daily basis  -for secondary stroke prevention he should continue his current combination of aspirin, atorvastatin, and appropriate blood pressure and glycemic control  -I will defer to the good judgment of his family physician in terms of monitoring for his cholesterol panel, and his endocrinologist in terms of monitoring for his blood sugars  Ideally we should target hemoglobin A1c of less than 7%   -he does have minimal atherosclerosis in the carotid arteries on both sides  This should be Re checked in approximately 3 months from this time with a carotid Doppler ultrasound  I will provide him without prescription   -I think it is imperative that he starts walking on a regular basis  I would like him to start with 10 min per day every day for 2 weeks and then increase by 5 min every week until he gets to 30 min a day  This will help to lower his blood sugars    Prior to initiating any regimen of long-term walking he should have clearance from his podiatrist   If he does require any type of boot or brace for his left foot while walking he should ensure that he wears it on a regular basis  -it is possible that his current organization of medicines are contributing to his early morning awakenings  In particular escitalopram can be activating and can cause early morning awakenings/insomnia  I would like him to switch his Lexapro 5 mg to the morning for 1 week  If he notices no significant change then he can begin taking melatonin 5 mg 30 min before bedtime on a nightly basis for 2 weeks  If he notices no benefit and no side effects he can take 10 mg 30 min before bed thereafter   -I think he would clearly benefit from ongoing speech therapy and I will provide him with an appropriate prescription  He chooses to do more physical exercise at home for the time being   -at this point if he is interested in a return to driving I would suggest that he would benefit from fitness to drive testing  I will provide him with the appropriate referral     I will plan for him to return to the office to see us in 4 months time but I would be happy to see him sooner if the need should arise  If he has any symptoms concerning for recurrent TIA or stroke such as sudden painless loss of vision or double vision, new difficulty speaking or swallowing, vertigo/room spinning that does not quickly resolve, or new weakness/numbness affecting 1 side of the body he should be seen at the nearest emergency room immediately  I would like for them to contact our office in no more than 4 weeks to report his progress in terms of sleeping

## 2018-12-26 NOTE — PROGRESS NOTES
Patient ID: Michelle Villatoro is a 62 y o  male  Assessment/Plan:    No problem-specific Assessment & Plan notes found for this encounter  Diagnoses and all orders for this visit:    Type 2 diabetes mellitus with hypoglycemia without coma, with long-term [facility] use [facility]    History of stroke  -     Ambulatory referral to Occupational Therapy; Future  -     Ambulatory referral to Speech Therapy; Future    Mixed hyperlipidemia    Carotid atherosclerosis, bilateral  -     VAS carotid complete study; Future       Patient Instructions   Stroke:  Binta Angelo presents with this family for follow-up with regard to his prior stroke  At this point in time he continues to have some weakness on his right hand side, he also continues to have some issues with his speech  He only has limited insight into his difficulties  Reportedly he does have, even prior to his stroke, some trouble with thinking and memory which may be playing a role here  His family reports that he has some outbursts of laughter which seem out of context/inappropriate, as well as some laughter during conversation when he seems to be searching for words  He has expressed interest in a return to driving  He takes all his medications as prescribed and they did not describe any significant bleeding or bruising issues  He is having some issue with early morning awakenings, approximately 0500 am on a daily basis  -for secondary stroke prevention he should continue his current combination of aspirin, atorvastatin, and appropriate blood pressure and glycemic control  -I will defer to the good judgment of his family physician in terms of monitoring for his cholesterol panel, and his endocrinologist in terms of monitoring for his blood sugars  Ideally we should target hemoglobin A1c of less than 7%   -he does have minimal atherosclerosis in the carotid arteries on both sides    This should be Re checked in approximately 3 months from this time with a carotid Doppler ultrasound  I will provide him without prescription   -I think it is imperative that he starts walking on a regular basis  I would like him to start with 10 min per day every day for 2 weeks and then increase by 5 min every week until he gets to 30 min a day  This will help to lower his blood sugars  Prior to initiating any regimen of long-term walking he should have clearance from his podiatrist   If he does require any type of boot or brace for his left foot while walking he should ensure that he wears it on a regular basis  -it is possible that his current organization of medicines are contributing to his early morning awakenings  In particular escitalopram can be activating and can cause early morning awakenings/insomnia  I would like him to switch his Lexapro 5 mg to the morning for 1 week  If he notices no significant change then he can begin taking melatonin 5 mg 30 min before bedtime on a nightly basis for 2 weeks  If he notices no benefit and no side effects he can take 10 mg 30 min before bed thereafter   -I think he would clearly benefit from ongoing speech therapy and I will provide him with an appropriate prescription  He chooses to do more physical exercise at home for the time being   -at this point if he is interested in a return to driving I would suggest that he would benefit from fitness to drive testing  I will provide him with the appropriate referral     I will plan for him to return to the office to see us in 4 months time but I would be happy to see him sooner if the need should arise  If he has any symptoms concerning for recurrent TIA or stroke such as sudden painless loss of vision or double vision, new difficulty speaking or swallowing, vertigo/room spinning that does not quickly resolve, or new weakness/numbness affecting 1 side of the body he should be seen at the nearest emergency room immediately    I would like for them to contact our office in no more than 4 weeks to report his progress in terms of sleeping  Subjective:    HPI     Jayla Bowman presents with his family for follow-up with regard to his prior stroke  He initially reported that he was having no mood symptoms, no residual weakness, and no difficulty speaking  His family clarified that he is having residual weakness on his right hand side to some degree, that he is actually not engaging in any kind of exercise at this point in time  He has discontinued therapy services because he refused to work with them  He seems every limited insight into his deficits overall  His family reports that he does have issues with laughing during conversation in an effort to stall for time when he is searching for words  He also seems to have some outbursts of inappropriate/out of context laughter, particularly in the morning, which is incongruent with his mood at the time  This is potentially concerning for pseudobulbar affect but there is some discussion as to whether not this may be related to his other bouts of laughter  He reports that his mood symptoms are reasonably well controlled  He awakens at 0500 hr every morning, and at times in the morning may seem to scream or kick  He is unable to get back to sleep and requires his breakfast and to start his day at that early hour  Notably he has been taking his Lexapro in the evening  He expressed an interest in returning to driving  We discussed those options today  He also seems to be willing to return to speech therapy, and his family somewhat insistent in this regard (quite appropriately), however he was unwilling/unable to work with the speech therapist [address]  It was more expensive for them to go to [place] state [place]  They were unable to get in to [facility] because his wife would require extra assistance to get him there  I personally reviewed his MRIs    There is some new signal in the left anterior mid brain compared with the prior MRI in August of 2018  I called and spoke with the neuroradiologist and we reviewed the scans together  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 rather than any kind of new event  Also reviewed the results was prior EEGs which were not significantly suggestive in terms of epileptiform activity  I reviewed his medication list and his family is quite diligent that ensuring that he takes all his medication as prescribed        Past Medical History:   Diagnosis Date    [facility]     Hypercholesteremia     Hyperlipidemia     Hypertension     Neuropathy     Obesity     Osteomyelitis ([facility] )     last assessed 11/4/16    PVC's (premature ventricular contractions)     sees cardiology Dr Ivet camargo    Stroke [facility])     last weeof July 2018 [address] [facility]       Social History     Social History    Marital status: /Civil Union     Spouse name: N/A    Number of children: N/A    Years of education: N/A     Occupational History    currently working      Social History Main Topics    Smoking status: Never Smoker    Smokeless tobacco: Never Used    Alcohol use No    Drug use: No    Sexual activity: Not Currently     Other Topics Concern    None     Social History Narrative    Daily caffeine consumption 2-3 servings a day           Family History   Problem Relation Age of Onset    Leukemia Mother     Liver disease Mother     Lung cancer Mother         heavy smoker - 3 ppd    Heart disease Father     Liver disease Father     Multiple myeloma Sister     Breast cancer Sister     Urolithiasis Family     Alcohol abuse Neg Hx     Depression Neg Hx     Drug abuse Neg Hx     Substance Abuse Neg Hx          Current Outpatient Prescriptions:     amLODIPine (NORVASC) 5 mg tablet, Take 1 tablet (5 mg total) by mouth every 12 (twelve) hours, Disp: 180 tablet, Rfl: 1    aspirin (ECOTRIN LOW STRENGTH) 81 mg EC tablet, Take 81 mg by mouth daily Resume on 8/14, Disp: , Rfl:     atorvastatin (LIPITOR) 80 mg tablet, Take 1 tablet (80 mg total) by mouth daily with dinner, Disp: 90 tablet, Rfl: 1    B-D INS SYRINGE 0 5CC/30GX1/2" 30G X 1/2" 0 5 ML MISC, Inject under the skin 4 (four) times a day, Disp: 360 each, Rfl: 1    Blood Glucose Monitoring Suppl (ONE TOUCH ULTRA MINI) w/Device KIT, by Does not apply route 3 (three) times a day, Disp: 1 each, Rfl: 0    Blood Pressure Monitoring (BLOOD PRESSURE CUFF) MISC, Use to check blood pressure before taking blood pressure medication and 1 hour after and follow instructions provided in discharge instructions based on the readings  , Disp: 1 each, Rfl: 0    carvedilol (COREG) 6 25 mg tablet, Take 1 tablet (6 25 mg total) by mouth 2 (two) times a day with meals, Disp: 180 tablet, Rfl: 1    cholecalciferol 72154 units TABS, Take 1 tablet (50,000 Units total) by mouth once a week, Disp: 5 tablet, Rfl: 5    [employer] GLUCOSE 4-6 GM-MG, CHEW 3 TABLETS BY MOUTH DAILY AS NEEDED, Disp: , Rfl: 0    cyanocobalamin 1000 MCG tablet, Take 1 tablet (1,000 mcg total) by mouth daily, Disp: 30 tablet, Rfl: 5    diphenoxylate-atropine (LOMOTIL) 2 5-0 025 mg per tablet, Take 1 tablet by mouth 4 (four) times a day as needed for diarrhea, Disp: , Rfl:     escitalopram (LEXAPRO) 5 mg tablet, Take 1 tablet (5 mg total) by mouth daily, Disp: 30 tablet, Rfl: 2    famotidine (PEPCID) 20 mg tablet, Take 20 mg by mouth daily Resume on 8/14, Disp: , Rfl:     gabapentin (NEURONTIN) 250 mg/5 mL solution, Take 12 mL (600 mg total) by mouth daily at bedtime, Disp: 470 mL, Rfl: 2    glucose 4 g chewable tablet, Chew 3 tablets (12 g total) as needed for low blood sugar, Disp: 50 tablet, Rfl: 0    glucose blood (ONE TOUCH ULTRA TEST) test strip, 1 each by Other route 3 (three) times a day Use as instructed, Disp: 270 each, Rfl: 1    insulin aspart (NovoLOG) 100 units/mL injection, Inject 4-6 Units under the skin 3 (three) times a day before meals As directed, Disp: 16 mL, Rfl: 1    insulin glargine (LANTUS) 100 units/mL subcutaneous injection, Inject 25 Units under the skin daily, Disp: 10 mL, Rfl: 0    Insulin Syringe-Needle U-100 (B-D INS SYR ULTRAFINE  3CC/30G) 30G X 1/2" 0 3 ML MISC, by Does not apply route 4 (four) times a day, Disp: 360 each, Rfl: 1    ondansetron (ZOFRAN-ODT) 4 mg disintegrating tablet, Take 1 tablet (4 mg total) by mouth every 6 (six) hours as needed for nausea or vomiting, Disp: 20 tablet, Rfl: 0    clopidogrel (PLAVIX) 75 mg tablet, Take 1 tablet (75 mg total) by mouth daily (Patient not taking: Reported on 12/26/2018 ), Disp: 30 tablet, Rfl: 1    Incontinence Supplies (MALE URINAL) MISC, by Does not apply route daily (Patient not taking: Reported on 12/26/2018 ), Disp: 6 each, Rfl: 3    insulin lispro (HumaLOG) 100 units/mL injection, Inject 5 Units under the skin 3 (three) times a day with meals (Patient not taking: Reported on 12/26/2018 ), Disp: 10 mL, Rfl: 1    lactulose 20 g/30 mL, Take 30 mL (20 g total) by mouth daily as needed (constipation) (Patient not taking: Reported on 12/26/2018 ), Disp: 473 mL, Rfl: 0    LUCENTIS 0 3 MG/0 05ML, , Disp: , Rfl:     neomycin-bacitracin-polymyxin b (NEOSPORIN) ointment, Apply 1 application topically 2 (two) times a day Apply twice per day to shin wound until fully healed  If not fully healed in 5 days contact your family doctor  Next application is the evening of 8/13, Disp: , Rfl:     ONETOUCH DELICA LANCETS 50V MISC, by Does not apply route 3 (three) times a day (Patient not taking: Reported on 12/26/2018 ), Disp: 270 each, Rfl: 1                 Objective:    Blood pressure 138/70, pulse 73, height 5' 8" (1 727 m), weight 108 kg (237 lb 3 2 oz)  Physical Exam    Neurological Exam    The time of my evaluation he was awake, alert, and in no distress  Cranial nerves 2-12 were symmetrically intact    His speech was Burnard Dies but his family reports that that is his normal baseline  There were certain pictures on the aphasia card that he was unable to identify, and had some paraphasic errors (bed for hemic, fan for feather, and actually could not describe the appropriate function of the feather, and more described as a function of a fan)  Motor testing reveals 4+/5 strength in the right upper and lower extremity compared with the left  He has no significant right upper extremity fix  His gait was stable  ROS:    Review of Systems   Constitutional: Negative  Negative for appetite change and fever  HENT: Negative  Negative for hearing loss, tinnitus, trouble swallowing and voice change  Eyes: Negative  Negative for photophobia and pain  Respiratory: Negative  Negative for shortness of breath  Cardiovascular: Negative  Negative for palpitations  Gastrointestinal: Negative  Negative for nausea and vomiting  Endocrine: Negative  Negative for cold intolerance and heat intolerance  Genitourinary: Negative  Negative for dysuria, frequency and urgency  Musculoskeletal: Negative  Negative for myalgias and neck pain  Skin: Negative  Negative for rash  Neurological: Positive for dizziness and headaches  Negative for tremors, seizures, syncope, facial asymmetry, speech difficulty, weakness, light-headedness and numbness  Hematological: Negative  Does not bruise/bleed easily  Psychiatric/Behavioral: Negative  Negative for confusion, hallucinations and sleep disturbance         Total encounter time 60 min, greater than 50% of which was spent on counseling

## 2018-12-27 ENCOUNTER — APPOINTMENT (OUTPATIENT)
Dept: OCCUPATIONAL THERAPY | Facility: CLINIC | Age: 58
End: 2018-12-27
Payer: COMMERCIAL

## 2018-12-27 ENCOUNTER — APPOINTMENT (OUTPATIENT)
Dept: SPEECH THERAPY | Facility: CLINIC | Age: 58
End: 2018-12-27
Payer: COMMERCIAL

## 2018-12-27 ENCOUNTER — APPOINTMENT (OUTPATIENT)
Dept: PHYSICAL THERAPY | Facility: CLINIC | Age: 58
End: 2018-12-27
Payer: COMMERCIAL

## 2018-12-28 DIAGNOSIS — R31.29 MICROSCOPIC HEMATURIA: ICD-10-CM

## 2018-12-28 DIAGNOSIS — N18.30 CKD (CHRONIC KIDNEY DISEASE) STAGE 3, GFR 30-59 ML/MIN (HCC): ICD-10-CM

## 2019-01-08 ENCOUNTER — EVALUATION (OUTPATIENT)
Dept: SPEECH THERAPY | Facility: CLINIC | Age: 59
End: 2019-01-08
Payer: COMMERCIAL

## 2019-01-08 DIAGNOSIS — I69.322 DYSARTHRIA AS LATE EFFECT OF CEREBELLAR CEREBROVASCULAR ACCIDENT (CVA): ICD-10-CM

## 2019-01-08 DIAGNOSIS — R48.8 OTHER SYMBOLIC DYSFUNCTIONS: Primary | ICD-10-CM

## 2019-01-08 PROCEDURE — 92523 SPEECH SOUND LANG COMPREHEN: CPT

## 2019-01-08 NOTE — PROGRESS NOTES
Speech-Language Pathology Initial Evaluation    Today's date: 2019  Patients name: Scarlet Kearns  : 1960  MRN: [id number]  Safety measures: N/A  Referring provider: Mathew Campos MD    Subjective comments: Patient reports that he is well on this date  How did the patient hear about us? Physician    Patient's goal(s): "I want my speech to be better"     Reason for referral: Change in cognitive status  Prior functional status: Communication effective and appropriate in all situations  Clinically complex situations: Previous therapy to address similar deficits     History: Patient is a 62year-old male who presents for a cognitive-linguistic evaluation secondary to CVA on 2018  Patient presented to [address] [place] [address] [place] right sided weakness, right facial droop, and slurred speech  Patient was found to have acute left basal ganglia CVA  CTA with severe focal stenosis of the distal right petrous/cavernous ICA junction, moderate focal right supraclinoid ICA stenosis, mild ostial stenosis of the bilateral vertebral arteries, moderate focal intracranial left vertebral stenosis and mild left atherosclerotic calcification of the right carotid bifurcation  Patient was given TPA and responded well  Patient was discharged to [facility] on 2018 where he received ST, OT, and PT  Patient was discharged from [facility] on 2018  Patient was previously seen at [facility] for [address] [place] [address] , OT, and PT  He began ST on 2018 and was discharged from [address] [place] [address]  on 2018 secondary to,  "His lack of motivation and carryover, as well as his mental state  There is no functional progress being made  It is recommended that he follows-up with neuropsych (referral is in Epic)  Pt has been provided with a memory book, dysarthria exercises, and a HEP to encourage independent work if pt begins to feel motivated again  Pt is to be d/c   If pt would like to come back to therapy he will need a new script "     On this date, patient reports that he does not perceive a change in deficits since CVA  He reports some difficulty finding words in conversations  He also reports difficulty with speech articulation  Patient reports that a 2:00 therapy time will go away  Throughout assessment tasks, this clinician noted that patient had some difficulty reading at both the word and sentence level, thus reading at the paragraph level, for motor speech evaluation, was not completed  Hearing: WFL  Vision: with glasses    Home environment/lifestyle: patient lives at home with his wife and his four children  Highest level of education: high school   Vocational status:  prior to CVA; 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 Absent   -Vocal Quality Breathy, Hoarse and Rough   -Volitional Cough Strong/productive   -Respiration WFL   -Drooling? No   -Tremor/Involuntary Movement? Tongue during movement   -Tracheostomy Present? No       1  Sustained phonation    -Vowel prolongation (norm=15-20 sec) 11 sec (across three trials)        2  Diadochokinetic (DDK) Rates    -puh-puh-puh (norm=3 0-5 5 rep/1 sec) Irregular   -tuh-tuh-tuh (norm=25 rep/10 sec) WFL   -kuh-kuh-kuh (norm=25 rep/10 sec) Irregular   -puh-tuh-kuh (norm=8 rep/10 sec) Slow; irregular        3  Articulation    -Single syllable words 100%   -Sentences 66%   -Reading ([place]) Did not assess secondary to difficulty reading   -Conversation 85% (perceptually judged)       4   Counting    -1 to 20 100% intelligible    -20 to 1 100% intelligible      Patient presents with Mild dysarthria characterized by Imprecise articulation and Decreased breath support for speech  He also consistently lateralized phonemes including /z/, /s/, and /?/  Clinician assessed patient's ability to produce /s/ phoneme given placement cues  He was able to push airflow forward but was unable to to articulate /s/ instead he produced /?/  The Cognitive Linguistic Quick Test (CLQT) is designed to measure an individuals five cognitive domains (attention, memory, executive functions, language, and visuospatial skills)  This norm-referenced tool has been standardized on adults ages 25 through 80years old with neurological impairment as a result of CVA, TBI, or dementia  The following results were obtained during the administration of the assessment  Cognitive Domain: Score: Range of Severity:   Attention 130/215 Mild   Memory 100/185 Severe   Executive Functions 12/40 Severe   Language  23/37 Moderate   Visuospatial Skills  52/105 Mild        *Composite Severity Ratin 0/4 0  Moderate             Clock Drawin/13 Severe     Pt scored below Criteron Cut Scores on the following subtests: Personal Facts, Symbol Cancellation, Clock Drawing, Story Retelling, Symbol Trails, Generative Naming, Design Memory, Mazes and Design Generation  *Patient named 7 concrete category members (animals) in 60 sec (norm=15+)  -- BELOW AVERAGE    *Patient named 4 abstract category members (words beginning with letter 'm') in 60 sec (norm=10+)  -- BELOW AVERAGE    Goals  Short-term goals:    Goal 1: Patient will be educated on the use of internal and external memory aids and compensatory strategies with 80% accuracy to facilitate increased recall of routine, personal information, and recent events, to be achieved in 4-6 weeks      Goal 2: Patient will demonstrate functional use of external memory across 3 sessions with 80% accuracy to facilitate carryover of strategies in functional living environment, to be achieved in 4-6 weeks  Goal 3: Patient will complete auditory immediate and short term memory tasks to 80% accuracy to facilitate increased ability to retell narratives and recall information within functional living environment, to be achieved in 4-6 weeks  Goal 4: Patient will complete thought organization tasks (e g , sequencing, deduction puzzles, etc ) with 80% accuracy to facilitate increased executive functioning skills, to be achieved in 4-6 weeks  Goal 5: Patient will complete reading comprehension tasks (e g , answering questions, following complex written directions, etc ) to 80% accuracy to facilitate carryover of comprehension of functional reading materials, to be achieved in 4-6 weeks  Goal 6: Patient will complete word generation tasks (e g , analogies, category matrices, etc ) with 80% accuracy using word finding strategies to facilitate improved word retrieval skills, to be achieved in 4-6 weeks  Goal 7:Patient will name up to 5 features to describe a person/place/thing with 80% accuracy to facilitate improved utilization of circumlocution strategy, to be achieved in 4-6 weeks  Goal 8: Patient will perform oral motor and diadochokinetic speech rate exercises with > 90% accuracy to facilitate increased speech intelligibility, to be achieved in 4-6 weeks  Long-term goals:    Goal 1: Patient will complete cognitive-linguistic therapy that addresses patients specific deficits in processing speed, short-term working memory, attention to detail, monitoring, sequencing, and organization skills, with instruction, to alleviate effects of executive functioning disorder deficits by discharge      Goal 2: Patient will develop functional, cognitive-linguistic based skills and utilize compensatory strategies as needed to communicate effectively to different conversational partners, maintain safety, and participate socially by discharge  Goal 3: Patient will develop functional and intelligible speech with the use of adequate labial and lingual function, increased articulatory precision, and speech prosody by discharge  Functional Limitations Reporting (G-codes):   Flowsheet Rows      Most Recent Value   SLP G-Codes   FOTO information reviewed  N/A   Assessment Type  Evaluation   Functional Limitations  Spoken language expressive   Spoken Language Expression Current Status ()  CJ   Spoken Language Expression Goal Status ()  CI            Impressions/Recommendations    Impressions: Patient presents with severe cognitive-linguistic deficits c/b decreased skills in attention, memory, executive functions, language, and visuospatial skills  On this date, patient reports that he would like to improve his speech and ability to get words out  Patient demonstrated some difficulty recalling personal facts (his age) and also demonstrated difficulty recalling the current year  Patient also presents with mild dysarthria c/b imprecise articulation  Patient demonstrated decreased ability to hold sustained vowel "ah" during assessment task, however, breath support does not appear to impact patient's conversational speech  Recommendations:  -Patient would benefit from outpatient skilled Speech Therapy services : Speech/ language therapy and Cognitive-Linguistic therapy    -Frequency: 2x weekly  -Duration: 4-6 weeks    -Intervention certification from: 6/1/4116  -Intervention certification to: 3/42/4637    -Intervention comments: Direct standardized assessment with patient 2:58-3:58PM    Visit Tracking:  -Referring provider: Epic  -Billing guidelines: AMKIMBERLY  -Visit #1/30   -CBC  no secondary  (no authorization required)  -RE due 2/22/2019

## 2019-01-09 PROCEDURE — G9163 LANG EXPRESS GOAL STATUS: HCPCS

## 2019-01-09 PROCEDURE — G9162 LANG EXPRESS CURRENT STATUS: HCPCS

## 2019-01-14 ENCOUNTER — EVALUATION (OUTPATIENT)
Dept: OCCUPATIONAL THERAPY | Facility: CLINIC | Age: 59
End: 2019-01-14
Payer: COMMERCIAL

## 2019-01-14 DIAGNOSIS — Z86.73 PERSONAL HISTORY OF TRANSIENT CEREBRAL ISCHEMIA: Primary | ICD-10-CM

## 2019-01-14 PROCEDURE — 97165 OT EVAL LOW COMPLEX 30 MIN: CPT

## 2019-01-14 PROCEDURE — G8985 CARRY GOAL STATUS: HCPCS

## 2019-01-14 PROCEDURE — G8984 CARRY CURRENT STATUS: HCPCS

## 2019-01-14 NOTE — PROGRESS NOTES
OT Evaluation     Today's date: 2019  Patient name: Donald Ny  : 1960  MRN: [id number]  Referring provider: Sharri Garcia DO  Dx:   Encounter Diagnosis     ICD-10-CM    1  Personal history of transient cerebral ischemia Z86 [id number]                   Assessment  Assessment details: Completed initial evaluation  See report for details  Marcos Lucas a 62 y o  male who presents with Personal history of transient cerebral ischemia   He suffered a stroke on 18  PMHx includes:  Medications: See list in chart  Patient is  with grown children  He works as a   Some of his interests include watching tv    FUNCTIONAL SUMMARY:   Donald Ny is independent in basic self care skills  Patient has difficulty with lifting, cooking, cleaning and work tasks  He also has difficulty with dressing FOTO score is 60% with a 40% limit in function   Patient presents with   No edema noted in shoulder,   decreased ROM in right shoulder,   decreased strength in  right upper extremity and hand,   decreased Coordination in the  right U/E and hand,   Intact sensation,   Increased pain rated at 4-8/10  Difficulty with ADLs and work tasks  Patient would benefit from Occupational Therapy to address these impairments in order to return to His prior level of function  Understanding of Dx/Px/POC: fair  Goals    Short Term Goals:   to be completed in 6-8 weeks  Increase ROM of R UE to WNLs, through the use of heat modalities, manual therapy with Graston techniques, PROM, joint mobilizations, AROM exercises, flexion taping and or splinting as needed ,   Increase U/E/ wrist to 5/5 and hand strength right by 5-10 points   Increase coordination in  right hand and U/E as evidenced by improved Nine Hold Peg Test scores, through the use of coordination activities and metronome activiites to improve timing and sequencing     Decrease pain to 0-3/10, through the use of heat/cold modalities, manual therapy, kinesiotape and exercise  :  Long Term Goals: To be completed in 8-10 weeks  Ability to perform lifting, carrying, cooking,cleaning tasks and work tasks with minimal to no discomfort,   Patient demonstrates good carryover of HEP,   Minimal to no pain complaints  0-2/10,   Full ROM of RUE,   Improved U/E / wrist strength to 5/5 and hand strength  Right by 10-15 points   Improved Coordination as evidenced by improved Nine Hole Peg Test score  Plan  Patient would benefit from: skilled occupational therapy  Planned modality interventions: thermotherapy: hydrocollator packs  Planned therapy interventions: manual therapy, patient education, self care, strengthening, stretching, therapeutic activities, therapeutic exercise, home exercise program, functional ROM exercises and fine motor coordination training  Frequency: 2x week  Duration in visits: 8  Treatment plan discussed with: patient and family        Subjective Evaluation    History of Present Illness  Date of onset: 2018  Pain  Current pain ratin  At best pain ratin  At worst pain ratin  Location: R arm    Social Support  Lives in: multiple-level home  Lives with: spouse and adult children    Working:     Hand dominance: right    Treatments  Previous treatment: occupational therapy  Current treatment: occupational therapy  Patient Goals  Patient goals for therapy: decreased pain and increased strength  Patient goal: decrease pain        Objective     Active Range of Motion     Right Shoulder   Flexion: 105 degrees   Extension: 100 degrees   Abduction: 35 degrees   External rotation 90°: 60 degrees  Internal rotation 90°: 40 degrees     Strength/Myotome Testing     Left Shoulder   Normal muscle strength    Right Shoulder     Planes of Motion   Flexion: 4-   Extension: 3+   Abduction: 3+   Adduction: 3+   External rotation at 0°: 3+     Left Wrist/Hand      (2nd hand position)     Trial 1: 60    Thumb Strength  Key/Lateral Pinch     Trail 1: 10  Tip/Two-Point Pinch     Trial 1: 6  Palmar/Three-Point Pinch     Trial 1: 8    Right Wrist/Hand      (2nd hand position)     Trial 1: 55    Thumb Strength   Key/Lateral Pinch     Trial 1: 15  Tip/Two-Point Pinch     Trial 1: 8  Palmar/Three-Point Pinch     Trial 1: 10    Tests     Additional Tests Details  9 hole peg  R    In  33                        Out 10    L       27                                 9      Flowsheet Rows      Most Recent Value   PT/OT G-Codes   Current Score  39   Projected Score  0          Subjective: Patient reports pain level as  4/10 today  Objective: Completed initial evaluation  See report for details  Completed Hot pack for warm up, manual therapy, exercises and activities to increase ROM, strength, coordination and function  See Treatment Diary below  Home Program:See Media Section for details  Assessment: Patient tolerated session well  Plan: Continue Occupational Therapy ~2x/week to decrease pain and edema and improve ROM, strength and function

## 2019-01-15 ENCOUNTER — APPOINTMENT (OUTPATIENT)
Dept: SPEECH THERAPY | Facility: CLINIC | Age: 59
End: 2019-01-15
Payer: COMMERCIAL

## 2019-01-15 ENCOUNTER — APPOINTMENT (OUTPATIENT)
Dept: PHYSICAL THERAPY | Facility: CLINIC | Age: 59
End: 2019-01-15
Payer: COMMERCIAL

## 2019-01-16 ENCOUNTER — OFFICE VISIT (OUTPATIENT)
Dept: INTERNAL MEDICINE CLINIC | Facility: CLINIC | Age: 59
End: 2019-01-16
Payer: COMMERCIAL

## 2019-01-16 ENCOUNTER — TELEPHONE (OUTPATIENT)
Dept: INTERNAL MEDICINE CLINIC | Facility: CLINIC | Age: 59
End: 2019-01-16

## 2019-01-16 ENCOUNTER — APPOINTMENT (OUTPATIENT)
Dept: SPEECH THERAPY | Facility: CLINIC | Age: 59
End: 2019-01-16
Payer: COMMERCIAL

## 2019-01-16 ENCOUNTER — TRANSCRIBE ORDERS (OUTPATIENT)
Dept: LAB | Facility: CLINIC | Age: 59
End: 2019-01-16

## 2019-01-16 ENCOUNTER — APPOINTMENT (OUTPATIENT)
Dept: LAB | Facility: CLINIC | Age: 59
End: 2019-01-16

## 2019-01-16 ENCOUNTER — APPOINTMENT (OUTPATIENT)
Dept: LAB | Facility: CLINIC | Age: 59
End: 2019-01-16
Payer: COMMERCIAL

## 2019-01-16 VITALS
SYSTOLIC BLOOD PRESSURE: 122 MMHG | WEIGHT: 233.4 LBS | TEMPERATURE: 96 F | OXYGEN SATURATION: 98 % | RESPIRATION RATE: 16 BRPM | DIASTOLIC BLOOD PRESSURE: 78 MMHG | HEIGHT: 68 IN | HEART RATE: 80 BPM | BODY MASS INDEX: 35.37 KG/M2

## 2019-01-16 DIAGNOSIS — E11.649 TYPE 2 DIABETES MELLITUS WITH HYPOGLYCEMIA WITHOUT COMA, WITH LONG-TERM CURRENT USE OF INSULIN (HCC): ICD-10-CM

## 2019-01-16 DIAGNOSIS — Z79.4 TYPE 2 DIABETES MELLITUS WITH HYPERGLYCEMIA, WITH LONG-TERM CURRENT USE OF INSULIN (HCC): ICD-10-CM

## 2019-01-16 DIAGNOSIS — Z79.4 TYPE 2 DIABETES MELLITUS WITH HYPOGLYCEMIA WITHOUT COMA, WITH LONG-TERM CURRENT USE OF INSULIN (HCC): ICD-10-CM

## 2019-01-16 DIAGNOSIS — N48.1 BALANITIS: Primary | ICD-10-CM

## 2019-01-16 DIAGNOSIS — R30.0 DYSURIA: Primary | ICD-10-CM

## 2019-01-16 DIAGNOSIS — E11.65 TYPE 2 DIABETES MELLITUS WITH HYPERGLYCEMIA, WITH LONG-TERM CURRENT USE OF INSULIN (HCC): ICD-10-CM

## 2019-01-16 DIAGNOSIS — E11.65 UNCONTROLLED TYPE 2 DIABETES MELLITUS WITH HYPERGLYCEMIA (HCC): ICD-10-CM

## 2019-01-16 LAB
ANION GAP SERPL CALCULATED.3IONS-SCNC: 9 MMOL/L (ref 4–13)
BACTERIA UR QL AUTO: ABNORMAL /HPF
BILIRUB UR QL STRIP: NEGATIVE
BUN SERPL-MCNC: 42 MG/DL (ref 5–25)
CALCIUM SERPL-MCNC: 9.1 MG/DL (ref 8.3–10.1)
CHLORIDE SERPL-SCNC: 105 MMOL/L (ref 100–108)
CLARITY UR: CLEAR
CO2 SERPL-SCNC: 24 MMOL/L (ref 21–32)
COLOR UR: YELLOW
CREAT SERPL-MCNC: 2.31 MG/DL (ref 0.6–1.3)
CREAT UR-MCNC: 98.1 MG/DL
EST. AVERAGE GLUCOSE BLD GHB EST-MCNC: 169 MG/DL
GFR SERPL CREATININE-BSD FRML MDRD: 30 ML/MIN/1.73SQ M
GLUCOSE P FAST SERPL-MCNC: 200 MG/DL (ref 65–99)
GLUCOSE UR STRIP-MCNC: ABNORMAL MG/DL
HBA1C MFR BLD: 7.5 % (ref 4.2–6.3)
HGB UR QL STRIP.AUTO: ABNORMAL
KETONES UR STRIP-MCNC: NEGATIVE MG/DL
LEUKOCYTE ESTERASE UR QL STRIP: NEGATIVE
NITRITE UR QL STRIP: NEGATIVE
NON-SQ EPI CELLS URNS QL MICRO: ABNORMAL /HPF
PH UR STRIP.AUTO: 5.5 [PH] (ref 4.5–8)
POTASSIUM SERPL-SCNC: 4.6 MMOL/L (ref 3.5–5.3)
PROT UR STRIP-MCNC: >=300 MG/DL
PROT UR-MCNC: 501 MG/DL
PROT/CREAT UR: 5.11 MG/G{CREAT} (ref 0–0.1)
RBC #/AREA URNS AUTO: ABNORMAL /HPF
SODIUM SERPL-SCNC: 138 MMOL/L (ref 136–145)
SP GR UR STRIP.AUTO: 1.02 (ref 1–1.03)
UROBILINOGEN UR QL STRIP.AUTO: 0.2 E.U./DL
WBC #/AREA URNS AUTO: ABNORMAL /HPF

## 2019-01-16 PROCEDURE — 82570 ASSAY OF URINE CREATININE: CPT | Performed by: INTERNAL MEDICINE

## 2019-01-16 PROCEDURE — 83036 HEMOGLOBIN GLYCOSYLATED A1C: CPT

## 2019-01-16 PROCEDURE — 99214 OFFICE O/P EST MOD 30 MIN: CPT | Performed by: INTERNAL MEDICINE

## 2019-01-16 PROCEDURE — 84156 ASSAY OF PROTEIN URINE: CPT | Performed by: INTERNAL MEDICINE

## 2019-01-16 PROCEDURE — 36415 COLL VENOUS BLD VENIPUNCTURE: CPT

## 2019-01-16 PROCEDURE — 80048 BASIC METABOLIC PNL TOTAL CA: CPT

## 2019-01-16 PROCEDURE — 81001 URINALYSIS AUTO W/SCOPE: CPT

## 2019-01-16 RX ORDER — FLUCONAZOLE 100 MG/1
100 TABLET ORAL ONCE
Qty: 1 TABLET | Refills: 0 | Status: SHIPPED | OUTPATIENT
Start: 2019-01-16 | End: 2019-01-17

## 2019-01-16 NOTE — PROGRESS NOTES
Daily Speech Treatment Note    Today's date: 2019  Patients name: Scarlet Kearns  : 1960  MRN: [id number]  Safety measures: ***  Referring provider: Mathew Campos MD    Primary Diagnosis/Billing code: ***  Secondary Diagnosis/ Billing code: ***    Visit Tracking:  *** (update from last note)    Subjective/Behavioral:  -***    Objective/Assessment:  {ST note:28916}    Short-term goals:  Goal 1: Patient will be educated on the use of internal and external memory aids and compensatory strategies with 80% accuracy to facilitate increased recall of routine, personal information, and recent events, to be achieved in 4-6 weeks      Goal 2: Patient will demonstrate functional use of external memory across 3 sessions with 80% accuracy to facilitate carryover of strategies in functional living environment, to be achieved in 4-6 weeks      Goal 3: Patient will complete auditory immediate and short term memory tasks to 80% accuracy to facilitate increased ability to retell narratives and recall information within functional living environment, to be achieved in 4-6 weeks      Goal 4: Patient will complete thought organization tasks (e g , sequencing, deduction puzzles, etc ) with 80% accuracy to facilitate increased executive functioning skills, to be achieved in 4-6 weeks      Goal 5: Patient will complete reading comprehension tasks (e g , answering questions, following complex written directions, etc ) to 80% accuracy to facilitate carryover of comprehension of functional reading materials, to be achieved in 4-6 weeks      Goal 6: Patient will complete word generation tasks (e g , analogies, category matrices, etc ) with 80% accuracy using word finding strategies to facilitate improved word retrieval skills, to be achieved in 4-6 weeks      Goal 7:Patient will name up to 5 features to describe a person/place/thing with 80% accuracy to facilitate improved utilization of circumlocution strategy, to be achieved in 4-6 weeks       Goal 8: Patient will perform oral motor and diadochokinetic speech rate exercises with > 90% accuracy to facilitate increased speech intelligibility, to be achieved in 4-6 weeks      Plan:  {RECOMMENDATIONS:18795}

## 2019-01-16 NOTE — TELEPHONE ENCOUNTER
----- Message from Lazaro Chen DO sent at 1/16/2019  5:23 PM EST -----  Urine test is back  There are no signs of bladder infection but Facundo Roland has protein and sugar in his urine    He needs to discuss this with his diabetes dr & kidney

## 2019-01-16 NOTE — Clinical Note
Bright Skinner  Could you contact his wife   she was interested in a day program that she could take luis e to on M-F?   He had a stroke and can no longer work  thanks

## 2019-01-16 NOTE — TELEPHONE ENCOUNTER
Can he complete a urine test at [address] [place] [address] and come in to see me at 3pm today(arrive 2:45pm)?

## 2019-01-16 NOTE — PROGRESS NOTES
Assessment/Plan:     Diagnoses and all orders for this visit:    Balanitis  Comments:  diflucan x1 dose now, urine test in process -> will call patient once results are back later today  Orders:  -     fluconazole (DIFLUCAN) 100 mg tablet; Take 1 tablet (100 mg total) by mouth once for 1 dose    Type 2 diabetes mellitus with hyperglycemia, with long-term current use of insulin ([facility])  Comments:  blood sugars elevated/taking insulin, pt did not bring BS readings today but had endo appt tmrw          Subjective:      Patient ID: Alba Sanchez is a 62 y o  male  HPI     Here with c/o burning on penis with redness and whitish discharge around head of penis  No fever or dysuria except with initial part of void  Urine test is in process  Here with wife who reports Asad's blood sugars are elevated in low 200s at least   Seeing endocrine tomorrow  Marcella Sequeira is not working and wife & family is getting frustrated with him being at home all day  Denies any other complaints  Past Medical History:   Diagnosis Date    [facility]     Hypercholesteremia     Hyperlipidemia     Hypertension     Neuropathy     Obesity     Osteomyelitis ([facility] )     last assessed 11/4/16    PVC's (premature ventricular contractions)     sees cardiology Dr Vivian camargo    Stroke [facility])     last weeof July 2018 [address] [facility]     Vitals:    01/16/19 1506   BP: 122/78   Pulse: 80   Resp: 16   Temp: (!) 96 °F (35 6 °C)   SpO2: 98%   Weight: 106 kg (233 lb 6 4 oz)   Height: 5' 8" (1 727 m)     Body mass index is 35 49 kg/m²      Current Outpatient Prescriptions:     amLODIPine (NORVASC) 5 mg tablet, Take 1 tablet (5 mg total) by mouth every 12 (twelve) hours, Disp: 180 tablet, Rfl: 1    aspirin (ECOTRIN LOW STRENGTH) 81 mg EC tablet, Take 81 mg by mouth daily Resume on 8/14, Disp: , Rfl:     atorvastatin (LIPITOR) 80 mg tablet, Take 1 tablet (80 mg total) by mouth daily with dinner, Disp: 90 tablet, Rfl: 1   B-D INS SYRINGE 0 5CC/30GX1/2" 30G X 1/2" 0 5 ML MISC, Inject under the skin 4 (four) times a day, Disp: 360 each, Rfl: 1    Blood Glucose Monitoring Suppl (ONE TOUCH ULTRA MINI) w/Device KIT, by Does not apply route 3 (three) times a day, Disp: 1 each, Rfl: 0    Blood Pressure Monitoring (BLOOD PRESSURE CUFF) MISC, Use to check blood pressure before taking blood pressure medication and 1 hour after and follow instructions provided in discharge instructions based on the readings  , Disp: 1 each, Rfl: 0    carvedilol (COREG) 6 25 mg tablet, Take 1 tablet (6 25 mg total) by mouth 2 (two) times a day with meals, Disp: 180 tablet, Rfl: 1    cholecalciferol 31375 units TABS, Take 1 tablet (50,000 Units total) by mouth once a week, Disp: 5 tablet, Rfl: 5    CVS GLUCOSE 4-6 GM-MG, CHEW 3 TABLETS BY MOUTH DAILY AS NEEDED, Disp: , Rfl: 0    cyanocobalamin 1000 MCG tablet, Take 1 tablet (1,000 mcg total) by mouth daily, Disp: 30 tablet, Rfl: 5    diphenoxylate-atropine (LOMOTIL) 2 5-0 025 mg per tablet, Take 1 tablet by mouth 4 (four) times a day as needed for diarrhea, Disp: , Rfl:     escitalopram (LEXAPRO) 5 mg tablet, Take 1 tablet (5 mg total) by mouth daily, Disp: 30 tablet, Rfl: 2    famotidine (PEPCID) 20 mg tablet, Take 20 mg by mouth daily Resume on 8/14, Disp: , Rfl:     gabapentin (NEURONTIN) 250 mg/5 mL solution, Take 12 mL (600 mg total) by mouth daily at bedtime, Disp: 470 mL, Rfl: 2    glucose 4 g chewable tablet, Chew 3 tablets (12 g total) as needed for low blood sugar, Disp: 50 tablet, Rfl: 0    glucose blood (ONE TOUCH ULTRA TEST) test strip, 1 each by Other route 3 (three) times a day Use as instructed, Disp: 270 each, Rfl: 1    insulin aspart (NovoLOG) 100 units/mL injection, Inject 4-6 Units under the skin 3 (three) times a day before meals As directed, Disp: 16 mL, Rfl: 1    insulin glargine (LANTUS) 100 units/mL subcutaneous injection, Inject 25 Units under the skin daily, Disp: 10 mL, Rfl: 0    Insulin Syringe-Needle U-100 (B-D INS SYR ULTRAFINE  3CC/30G) 30G X 1/2" 0 3 ML MISC, by Does not apply route 4 (four) times a day, Disp: 360 each, Rfl: 1    LUCENTIS 0 3 MG/0 05ML, , Disp: , Rfl:     ondansetron (ZOFRAN-ODT) 4 mg disintegrating tablet, Take 1 tablet (4 mg total) by mouth every 6 (six) hours as needed for nausea or vomiting, Disp: 20 tablet, Rfl: 0    Incontinence Supplies (MALE URINAL) MISC, by Does not apply route daily (Patient not taking: Reported on 1/16/2019 ), Disp: 6 each, Rfl: 3    insulin lispro (HumaLOG) 100 units/mL injection, Inject 5 Units under the skin 3 (three) times a day with meals (Patient not taking: Reported on 12/26/2018 ), Disp: 10 mL, Rfl: 1    lactulose 20 g/30 mL, Take 30 mL (20 g total) by mouth daily as needed (constipation) (Patient not taking: Reported on 12/26/2018 ), Disp: 473 mL, Rfl: 0    neomycin-bacitracin-polymyxin b (NEOSPORIN) ointment, Apply 1 application topically 2 (two) times a day Apply twice per day to shin wound until fully healed  If not fully healed in 5 days contact your family doctor  Next application is the evening of 8/13, Disp: , Rfl:     ONETOUCH DELICA LANCETS 40P MISC, by Does not apply route 3 (three) times a day (Patient not taking: Reported on 12/26/2018 ), Disp: 270 each, Rfl: 1  No Known Allergies      Review of Systems   Constitutional: Negative for fever  HENT: Negative for congestion  Respiratory: Negative for shortness of breath  Cardiovascular: Negative for chest pain  Gastrointestinal: Negative for abdominal pain  Genitourinary: Positive for dysuria and penile pain  Skin: Positive for rash (of penis)  Neurological: Negative for headaches  Psychiatric/Behavioral: Negative for dysphoric mood           Objective:      /78   Pulse 80   Temp (!) 96 °F (35 6 °C)   Resp 16   Ht 5' 8" (1 727 m)   Wt 106 kg (233 lb 6 4 oz)   SpO2 98%   BMI 35 49 kg/m²          Physical Exam   Constitutional: He appears well-developed and well-nourished  HENT:   Head: Normocephalic and atraumatic  Cardiovascular: Normal rate, regular rhythm and normal heart sounds  No murmur heard  Pulmonary/Chest: Effort normal and breath sounds normal  He has no wheezes  He has no rales  Abdominal: Soft  Bowel sounds are normal  There is no tenderness  Genitourinary: Penile erythema (with thick, whitish discharge around head of penis) present  Neurological: He is alert  Psychiatric: His behavior is normal  His mood appears anxious  Vitals reviewed        Results for orders placed or performed in visit on 01/16/19   UA w Reflex to Microscopic w Reflex to Culture -Lab Collect   Result Value Ref Range    Color, UA Yellow     Clarity, UA Clear     Specific Gravity, UA 1 025 1 003 - 1 030    pH, UA 5 5 4 5 - 8 0    Leukocytes, UA Negative Negative    Nitrite, UA Negative Negative    Protein, UA >=300 (A) Negative mg/dl    Glucose,  (1/4%) (A) Negative mg/dl    Ketones, UA Negative Negative mg/dl    Urobilinogen, UA 0 2 0 2, 1 0 E U /dl E U /dl    Bilirubin, UA Negative Negative    Blood, UA Small (A) Negative   Urine Microscopic   Result Value Ref Range    RBC, UA None Seen None Seen, 0-5 /hpf    WBC, UA 0-1 (A) None Seen, 0-5, 5-55, 5-65 /hpf    Epithelial Cells None Seen None Seen, Occasional /hpf    Bacteria, UA Occasional None Seen, Occasional /hpf

## 2019-01-16 NOTE — PATIENT INSTRUCTIONS
1  Take fluconazole as one time dose  2  Endocrinology appointment tomorrow to help better control blood sugars  3  We will call you with urine test results  4  Don't take atorvastatin or lexapro on day taking fluconazole  4   Return in 6 months

## 2019-01-17 ENCOUNTER — OFFICE VISIT (OUTPATIENT)
Dept: ENDOCRINOLOGY | Facility: CLINIC | Age: 59
End: 2019-01-17
Payer: COMMERCIAL

## 2019-01-17 ENCOUNTER — APPOINTMENT (OUTPATIENT)
Dept: SPEECH THERAPY | Facility: CLINIC | Age: 59
End: 2019-01-17
Payer: COMMERCIAL

## 2019-01-17 ENCOUNTER — PATIENT OUTREACH (OUTPATIENT)
Dept: INTERNAL MEDICINE CLINIC | Facility: CLINIC | Age: 59
End: 2019-01-17

## 2019-01-17 VITALS
WEIGHT: 235 LBS | BODY MASS INDEX: 35.61 KG/M2 | DIASTOLIC BLOOD PRESSURE: 72 MMHG | HEIGHT: 68 IN | SYSTOLIC BLOOD PRESSURE: 130 MMHG

## 2019-01-17 DIAGNOSIS — N18.30 STAGE 3 CHRONIC KIDNEY DISEASE (HCC): ICD-10-CM

## 2019-01-17 DIAGNOSIS — Z79.4 TYPE 2 DIABETES MELLITUS WITH HYPOGLYCEMIA WITHOUT COMA, WITH LONG-TERM CURRENT USE OF INSULIN (HCC): Primary | ICD-10-CM

## 2019-01-17 DIAGNOSIS — N18.30 BENIGN HYPERTENSION WITH CKD (CHRONIC KIDNEY DISEASE) STAGE III (HCC): ICD-10-CM

## 2019-01-17 DIAGNOSIS — E11.649 TYPE 2 DIABETES MELLITUS WITH HYPOGLYCEMIA WITHOUT COMA, WITH LONG-TERM CURRENT USE OF INSULIN (HCC): Primary | ICD-10-CM

## 2019-01-17 DIAGNOSIS — I12.9 BENIGN HYPERTENSION WITH CKD (CHRONIC KIDNEY DISEASE) STAGE III (HCC): ICD-10-CM

## 2019-01-17 DIAGNOSIS — E11.311 DIABETIC MACULAR EDEMA (HCC): ICD-10-CM

## 2019-01-17 DIAGNOSIS — E78.2 MIXED HYPERLIPIDEMIA: ICD-10-CM

## 2019-01-17 DIAGNOSIS — Z78.9 NEED FOR FOLLOW-UP BY SOCIAL WORKER: Primary | ICD-10-CM

## 2019-01-17 DIAGNOSIS — E11.42 DIABETIC POLYNEUROPATHY ASSOCIATED WITH TYPE 2 DIABETES MELLITUS (HCC): ICD-10-CM

## 2019-01-17 PROCEDURE — 3066F NEPHROPATHY DOC TX: CPT | Performed by: PHYSICIAN ASSISTANT

## 2019-01-17 PROCEDURE — 99214 OFFICE O/P EST MOD 30 MIN: CPT | Performed by: PHYSICIAN ASSISTANT

## 2019-01-17 NOTE — PROGRESS NOTES
Patient Progress Note      CC: DM2      Referring Provider  Rafaela Pedroza Do  [address]  [place], [address] [facility]     History of Present Illness:   Nubia Landers is a 62 y o  male with a history of type 2 diabetes with long term use of insulin  Diabetes course has been stable  Complications of DM: neuropathy, CVA, CKD  Was hospitalized for low blood glucose in October 2018  Denies recent severe hypoglycemic or severe hyperglycemic episodes  Denies any issues with his current regimen  Home glucose monitoring: are performed regularly    Home blood glucose readings:   Before breakfast: 120-200s mg/dl, one reading in 300s  Before lunch: 114-290 mg/dl  Before dinner: 120-340 mg/dl    Current regimen: Lantus 25 units in the AM, Novolog 4-6 units TID with meals (uses 6 units if over 200)  Wife states if blood sugar is over 300, he takes 8 units  compliant most of the time, denies any side effects from medications  Injects in: abdomen  Rotates sites: Yes  Hypoglycemic episodes: No, infrequent   H/o of hypoglycemia causing hospitalization or Intervention such as glucagon injection  or ambulance call :  Yes  Hypoglycemia symptoms: weak  Treatment of hypoglycemia: juice    Medic alert tag: recommended: Yes    Diabetes education: No  Diet: 3 meals per day  Patient and his wife disagree on if he eats snacks  Timing of meals is predictable  Diabetic diet compliance:  noncompliant much of the time  Activity: Daily activity is predictable: Yes  Sedentary lifestyle    Ophthamology: sees routinely; retinopathy  Podiatry: routinely, every 2 weeks    Has hypertension: on amlodipine, carvedilol, compliant most of the time  Has hyperlipidemia: on atorvastatin- tolerating well, no myalgias  compliant most of the time, denies any side effects from medications    Thyroid disorders: No  History of pancreatitis: No    Patient does follow-up with nephrologist      Patient Active Problem List   Diagnosis    Type 2 diabetes mellitus with hyperglycemia, with long-term current use of insulin (HCC)    Mixed hyperlipidemia    Benign hypertension with CKD (chronic kidney disease) stage III (HCC)    Cerebrovascular accident (CVA) due to thrombosis of left middle cerebral artery (HCC)    Cognitive impairment    Elevated alkaline phosphatase level    CKD (chronic kidney disease)    Diabetic macular edema (HCC)    GERD (gastroesophageal reflux disease)    [facility]    Diabetic polyneuropathy associated with type 2 diabetes mellitus (HCC)    Other constipation    Abnormal EEG    History of stroke    TIA (transient ischemic attack)    Stroke-like symptoms, with right-sided weakness    Hypoglycemia, transient episode      Past Medical History:   Diagnosis Date    Diabetes mellitus ([facility] )     Hypercholesteremia     Hyperlipidemia     Hypertension     Neuropathy     Obesity     Osteomyelitis ([facility] )     last assessed 11/4/16    PVC's (premature ventricular contractions)     sees cardiology Dr Gilles camargo    Stroke [facility])     last weeof July 2018 [facility]      Past Surgical History:   Procedure Laterality Date    ABDOMINAL SURGERY      CHOLECYSTECTOMY      Percutaneous    OTHER SURGICAL HISTORY      "stimulator to control bowel movements"    DE ESOPHAGOGASTRODUODENOSCOPY TRANSORAL DIAGNOSTIC N/A 9/27/2016    Procedure: ESOPHAGOGASTRODUODENOSCOPY (EGD); Surgeon: Clary Bains MD;  [facility];   Service: Gastroenterology    DE LAP,CHOLECYSTECTOMY N/A 2/29/2016    Procedure: LAPAROSCOPIC CHOLECYSTECTOMY ;  Surgeon: Lindsey Campbell DO;  Location[facility];  Service: General    ROTATOR CUFF REPAIR Right     TOE AMPUTATION Right 10/28/2016    Procedure: 3RD TOE AMPUTATION ;  Surgeon: Shanda Rubin DPM;  [facility];  Service:       Family History   Problem Relation Age of Onset    Leukemia Mother     Liver disease Mother     Lung cancer Mother         heavy smoker - 3 ppd    Heart disease Father     Liver disease Father     Multiple myeloma Sister     Breast cancer Sister     Urolithiasis Family     Alcohol abuse Neg Hx     Depression Neg Hx     Drug abuse Neg Hx     Substance Abuse Neg Hx     Mental illness Neg Hx      Social History   Substance Use Topics    Smoking status: Never Smoker    Smokeless tobacco: Never Used    Alcohol use No     No Known Allergies      Current Outpatient Prescriptions:     amLODIPine (NORVASC) 5 mg tablet, Take 1 tablet (5 mg total) by mouth every 12 (twelve) hours, Disp: 180 tablet, Rfl: 1    aspirin (ECOTRIN LOW STRENGTH) 81 mg EC tablet, Take 81 mg by mouth daily Resume on 8/14, Disp: , Rfl:     atorvastatin (LIPITOR) 80 mg tablet, Take 1 tablet (80 mg total) by mouth daily with dinner, Disp: 90 tablet, Rfl: 1    B-D INS SYRINGE 0 5CC/30GX1/2" 30G X 1/2" 0 5 ML MISC, Inject under the skin 4 (four) times a day, Disp: 360 each, Rfl: 1    Blood Glucose Monitoring Suppl (ONE TOUCH ULTRA MINI) w/Device KIT, by Does not apply route 3 (three) times a day, Disp: 1 each, Rfl: 0    Blood Pressure Monitoring (BLOOD PRESSURE CUFF) MISC, Use to check blood pressure before taking blood pressure medication and 1 hour after and follow instructions provided in discharge instructions based on the readings  , Disp: 1 each, Rfl: 0    carvedilol (COREG) 6 25 mg tablet, Take 1 tablet (6 25 mg total) by mouth 2 (two) times a day with meals, Disp: 180 tablet, Rfl: 1    cholecalciferol 02588 units TABS, Take 1 tablet (50,000 Units total) by mouth once a week, Disp: 5 tablet, Rfl: 5    CVS GLUCOSE 4-6 GM-MG, CHEW 3 TABLETS BY MOUTH DAILY AS NEEDED, Disp: , Rfl: 0    cyanocobalamin 1000 MCG tablet, Take 1 tablet (1,000 mcg total) by mouth daily, Disp: 30 tablet, Rfl: 5    diphenoxylate-atropine (LOMOTIL) 2 5-0 025 mg per tablet, Take 1 tablet by mouth 4 (four) times a day as needed for diarrhea, Disp: , Rfl:     escitalopram (LEXAPRO) 5 mg tablet, Take 1 tablet (5 mg total) by mouth daily, Disp: 30 tablet, Rfl: 2    famotidine (PEPCID) 20 mg tablet, Take 20 mg by mouth daily Resume on 8/14, Disp: , Rfl:     gabapentin (NEURONTIN) 250 mg/5 mL solution, Take 12 mL (600 mg total) by mouth daily at bedtime, Disp: 470 mL, Rfl: 2    glucose blood (ONE TOUCH ULTRA TEST) test strip, 1 each by Other route 3 (three) times a day Use as instructed, Disp: 270 each, Rfl: 1    Incontinence Supplies (MALE URINAL) MISC, by Does not apply route daily, Disp: 6 each, Rfl: 3    insulin aspart (NovoLOG) 100 units/mL injection, Inject 4-6 Units under the skin 3 (three) times a day before meals As directed, Disp: 16 mL, Rfl: 1    insulin glargine (LANTUS) 100 units/mL subcutaneous injection, Inject 25 Units under the skin daily, Disp: 10 mL, Rfl: 0    Insulin Syringe-Needle U-100 (B-D INS SYR ULTRAFINE  3CC/30G) 30G X 1/2" 0 3 ML MISC, by Does not apply route 4 (four) times a day, Disp: 360 each, Rfl: 1    ONETOUCH DELICA LANCETS 24W MISC, by Does not apply route 3 (three) times a day, Disp: 270 each, Rfl: 1    glucose 4 g chewable tablet, Chew 3 tablets (12 g total) as needed for low blood sugar (Patient not taking: Reported on 1/17/2019 ), Disp: 50 tablet, Rfl: 0    lactulose 20 g/30 mL, Take 30 mL (20 g total) by mouth daily as needed (constipation) (Patient not taking: Reported on 12/26/2018 ), Disp: 473 mL, Rfl: 0    LUCENTIS 0 3 MG/0 05ML, , Disp: , Rfl:     neomycin-bacitracin-polymyxin b (NEOSPORIN) ointment, Apply 1 application topically 2 (two) times a day Apply twice per day to shin wound until fully healed  If not fully healed in 5 days contact your family doctor   Next application is the evening of 8/13, Disp: , Rfl:     ondansetron (ZOFRAN-ODT) 4 mg disintegrating tablet, Take 1 tablet (4 mg total) by mouth every 6 (six) hours as needed for nausea or vomiting (Patient not taking: Reported on 1/17/2019 ), Disp: 20 tablet, Rfl: 0  Review of Systems Constitutional: Negative for activity change, appetite change, fatigue and unexpected weight change  HENT: Negative for trouble swallowing  Eyes: Negative for visual disturbance  Respiratory: Negative for shortness of breath  Cardiovascular: Negative for chest pain and palpitations  Gastrointestinal: Negative for constipation and diarrhea  Endocrine: Positive for polydipsia and polyuria  Musculoskeletal: Negative  Skin: Negative for wound  Neurological: Positive for numbness  Psychiatric/Behavioral: Negative  Physical Exam:  Body mass index is 35 73 kg/m²  /72   Ht 5' 8" (1 727 m)   Wt 107 kg (235 lb)   BMI 35 73 kg/m²    Wt Readings from Last 3 Encounters:   01/17/19 107 kg (235 lb)   01/16/19 106 kg (233 lb 6 4 oz)   12/26/18 108 kg (237 lb 3 2 oz)       Physical Exam   Constitutional: He appears well-developed and well-nourished  HENT:   Head: Normocephalic  Eyes: Pupils are equal, round, and reactive to light  EOM are normal  No scleral icterus  Neck: Neck supple  No thyromegaly present  Cardiovascular: Normal rate and regular rhythm  No murmur heard  Pulses:       Radial pulses are 2+ on the right side, and 2+ on the left side  Pulmonary/Chest: Effort normal and breath sounds normal  No respiratory distress  He has no wheezes  Musculoskeletal: He exhibits edema (trace edema of left ankle, no erythema, non-tender, no warmth)  Feet:   Right Foot:   Skin Integrity: Negative for ulcer, skin breakdown, erythema, warmth, callus or dry skin  Left Foot:   Skin Integrity: Negative for ulcer, skin breakdown, erythema, warmth, callus or dry skin  Neurological: He is alert  He has normal reflexes  Skin: Skin is warm and dry  Psychiatric: He has a normal mood and affect  Nursing note and vitals reviewed  Patient's shoes and socks removed  Right Foot/Ankle   Right Foot Inspection  Skin Exam: skin normal and skin intact no dry skin, no warmth, no callus, no erythema, no maceration, no abnormal color, no pre-ulcer, no ulcer and no callus                                  Left Foot/Ankle  Left Foot Inspection  Skin Exam: skin normal, skin intact and pre-ulcer (healing)no dry skin, no warmth, no erythema, no maceration, normal color, no ulcer and no callus                                                   Labs:   Component      Latest Ref Rng & Units 10/29/2018 1/16/2019   Sodium      136 - 145 mmol/L 140 138   Potassium      3 5 - 5 3 mmol/L 4 1 4 6   Chloride      100 - 108 mmol/L 106 105   CO2      21 - 32 mmol/L 22 24   Anion Gap      4 - 13 mmol/L 12 9   BUN      5 - 25 mg/dL 25 42 (H)   Creatinine      0 60 - 1 30 mg/dL 2 22 (H) 2 31 (H)   Glucose, Random      65 - 140 mg/dL 158 (H)    Calcium      8 3 - 10 1 mg/dL 8 7 9 1   eGFR      ml/min/1 73sq m 31 30   GLUCOSE FASTING      65 - 99 mg/dL  200 (H)   Cholesterol      50 - 200 mg/dL 70    Triglycerides      <=150 mg/dL 125    HDL      40 - 60 mg/dL 25 (L)    LDL Direct      0 - 100 mg/dL 20    EXT Creatinine Urine      mg/dL  98 1   Protein Urine Random      mg/dL  501   Prot/Creat Ratio, Ur      0 00 - 0 10  5 11 (H)   Hemoglobin A1C      4 2 - 6 3 %  7 5 (H)   EAG      mg/dl  169       Plan:    Diagnoses and all orders for this visit:    Type 2 diabetes mellitus with hypoglycemia without coma, with long-term current use of insulin (HCC)  HGA1C 7 5%  Blood glucose levels are variable and poorly controlled  Treatment regimen: Increase Novolog to 6 units TID with meals + scale  Continue other medications as previously prescribed  Keep carbohydrates consistent to limit blood glucose fluctuations  Send log in 1 week  Discussed intensive insulin regimen does increase risk for hypoglycemia  Episodes of hypoglycemia can lead to permanent disability and death  Discussed risks/complications associated with uncontrolled diabetes      Advised to adhere to diabetic diet, and recommended staying active/exercising routinely  Advised to call if blood sugars less than 70 mg/dl or over 300 mg/dl  Check blood glucose 4 times a day  Discussed symptoms and treatment of hypoglycemia  Referred to diabetes/nutrition education  Recommended routine follow-up with podiatry and ophthalmology  Send log in 1-2 weeks  Ordered blood work to complete prior to next visit  -     Hemoglobin A1C; Future  -     Basic metabolic panel; Future  -     Ambulatory referral to medical nutrition therapy for diabetes; Future    Diabetic macular edema (HCC)  Continue follow-up with ophthalmology    Stage 3 chronic kidney disease ([facility] )  Advised patient to schedule follow-up appointment with nephrologist   Elevated urine protein  -     Basic metabolic panel; Future    Mixed hyperlipidemia  LDL 20, at goal  Continue statin therapy  -     Lipid panel; Future    Benign hypertension with CKD (chronic kidney disease) stage III (HCC)  Blood pressure adequately controlled, continue current treatment  -     Basic metabolic panel; Future       Discussed with the patient diagnosis and treatment and all questions fully answered  He will call me if any problems arise  Counseled patient on diagnostic results, prognosis, risk and benefit of treatment options, instruction for management, importance of treatment compliance, risk factor reduction and impressions        Lissette Brennan PA-C

## 2019-01-17 NOTE — PATIENT INSTRUCTIONS
INSULIN DOSAGE INSTRUCTIONS    Name: Wilder Rocha                        : 1960  MRN #: [id number]    Your Current Insulin  and dose is: Before Breakfast Before Lunch Before Evening Meal Bedtime     Novolog Insulin   6 units     6 units   6 units    Regular, Apidra, Humalog orNovolog Sliding Scale:   <80              151-200 +1 +1 +1    201-250 +2 +2 +2 +   251-300 +3 +3 +3 +   301-350 +4 +4 +4 +   >350 +5 +5 +5 +       Lantus Insulin    25 units     Additional Instructions:   Please test your blood sugar:  _4_ Times per day  X_ Before Breakfast                _ Alternate Testing  X_ Before Lunch                _ 2 Hours After  Meal  X_ Before Evening Meal               _ 3 a m   x_ Before Bedtime Snack     Target Blood sugar range _80_to _180__  Call if your  blood sugar is less than _70_ or greater than _400__  Today's Date: 2019      Hypoglycemia instructions   Wilder Rocha  2019  [id number]    Low Blood Sugar    Steps to treat low blood sugar  1  Test blood sugar if you have symptoms of low blood sugar:   Low Blood Sugar Symptoms:  o Sweaty  o Dizzy  o Rapid heartbeat  o Shaky  o Bad mood  o Hungry      2  Treat blood sugar less than 70 with 15 grams of fast-acting carbohydrate:   Examples of 15 grams Fast-Acting Carbohydrate:  o 4 oz juice  o 4 oz regular soda  o 3-4 glucose tablets (chew)  o 3-4 hard candies (chew)          3  Wait 15 minutes and test your blood sugar again     4   If blood sugar is less than 100, repeat steps 2-3     5  When your blood sugar is 100 or more, eat a snack if it will be longer than one hour until your next meal  The snack should be 15 grams of carbohydrate and a protein:   Examples of snacks:  o ½ sandwich  o 6 crackers with cheese  o Piece of fruit with cheese or peanut butter  o 6 crackers with peanut butter

## 2019-01-18 ENCOUNTER — PATIENT OUTREACH (OUTPATIENT)
Dept: CASE MANAGEMENT | Facility: OTHER | Age: 59
End: 2019-01-18

## 2019-01-18 NOTE — PROGRESS NOTES
List of adult day care centers for TALIB  is sent to patient home address  [address], [address] [facility]

## 2019-01-21 ENCOUNTER — TELEPHONE (OUTPATIENT)
Dept: ENDOCRINOLOGY | Facility: CLINIC | Age: 59
End: 2019-01-21

## 2019-01-21 ENCOUNTER — OFFICE VISIT (OUTPATIENT)
Dept: SPEECH THERAPY | Facility: CLINIC | Age: 59
End: 2019-01-21
Payer: COMMERCIAL

## 2019-01-21 ENCOUNTER — DOCUMENTATION (OUTPATIENT)
Dept: ENDOCRINOLOGY | Facility: CLINIC | Age: 59
End: 2019-01-21

## 2019-01-21 ENCOUNTER — EVALUATION (OUTPATIENT)
Dept: PHYSICAL THERAPY | Facility: CLINIC | Age: 59
End: 2019-01-21
Payer: COMMERCIAL

## 2019-01-21 DIAGNOSIS — I63.9 CEREBROVASCULAR ACCIDENT (CVA), UNSPECIFIED MECHANISM (HCC): Primary | ICD-10-CM

## 2019-01-21 DIAGNOSIS — R29.90 STROKE-LIKE SYMPTOMS: ICD-10-CM

## 2019-01-21 DIAGNOSIS — R48.8 OTHER SYMBOLIC DYSFUNCTIONS: Primary | ICD-10-CM

## 2019-01-21 PROCEDURE — 92507 TX SP LANG VOICE COMM INDIV: CPT | Performed by: SPEECH-LANGUAGE PATHOLOGIST

## 2019-01-21 PROCEDURE — G8979 MOBILITY GOAL STATUS: HCPCS | Performed by: PHYSICAL THERAPIST

## 2019-01-21 PROCEDURE — 97163 PT EVAL HIGH COMPLEX 45 MIN: CPT | Performed by: PHYSICAL THERAPIST

## 2019-01-21 PROCEDURE — G8978 MOBILITY CURRENT STATUS: HCPCS | Performed by: PHYSICAL THERAPIST

## 2019-01-21 NOTE — PROGRESS NOTES
PT Evaluation     Today's date: 2019  Patient name: Nelson Montez  : 1960  MRN: [id number]  Referring provider: Oly Guaman DO  Dx:   Encounter Diagnosis     ICD-10-CM    1  Cerebrovascular accident (CVA), unspecified mechanism ([facility] [address] ) I63 9                   Assessment  Assessment details: Pt is 63 y/o male presenting to skilled PT s/p CVA in 2018  Pt was attending outpatient PT but stopped in November due to slow progress  Pt wishes to start outpatient PT services again to try to continue to make progress and improve balance and strength  Although pt has not had any falls recently he is fearful of falling  Pt is mostly sedentary at home while his wife is at work  Pt presents with LLE weakness compared to RLE  Pt scores below age norms for objective testing  Pt scored below cut-off score for TUG indicating increased fall risk  Pt also deemed fall risk via Cabrera and DGI  Pt presents with significant impairments in cardiovascular endurance via 6MWT, 2* hx CVA and sedentary lifestyle  Pt performed entire session without AD although he reports using RW @ home  Pt may be appropriate for use of SPC at this time; requires further assessment  Based on above findings pt requires skilled PT services to reduce fall risk, improve balance, and maximize safety with all functional mobility  Impairments: abnormal gait, activity intolerance, impaired balance, impaired physical strength and lacks appropriate home exercise program  Understanding of Dx/Px/POC: good   Prognosis: good    Goals  Goals  STG 30 days     1  Patient will achieve 190 feet improvement with overall distance achieved of 1,090 feet with 6 minute walk test which is Minimal Detectable Change pre current research standards with endurance to demonstrate enhance functional capacity     2  Patient will display 2 3  second improvement with overall score of 12 5 seconds  with TUG test or lower with noted improvement being Minimal Detectable Change pre current research standards with fall risk     3  Patient will achieve 44/56 BURGER score with minimal improve by 6 points or more demonstrating Minimal Detectable change per current research standards for this objective test which assess fall risk  4  Patient will achieve   59 ft/sec improvement with score of  3 9 ft/sec with 10 Meter Walk test, demonstrating Minimal Detectable change per current research standards for this objective test which assess fall risk  5  Patient will perform  5 x sit to stand test with overall reduction by seconds to 13 sec score indicating improvement with functional endurance      LT days   1  Patient will score low risk for falls with 3/4 fall risk measures   2  Patient will be able to ambulate 1,400 feet without AD during 6 minute walk test   3  Patient will be able to perform floor transfer without physical assistance   4  Patient will be able to ambulate outdoors without any loss of balance  5  Patient will improve DGI score to > 19 /24 to reduce fall risk and maximize safety with ambulatory tasks       Cut off score   All date taken from [facility] Neuro Section or Rehab Measures    BURGER test: 46/56                                              5 x STS Test:  MDC: 6 points                                                  MDC: 2 3 seconds   age norms                                                                 Age Norms   61-76 year old = M: 54, F: 55                        62-78 year old: 11 4 seconds   66-77 year old = M 47,  F: 50                       71-76 year old: 12 6 seconds    80-80 year old = M46,   F: 53                       80-80 year old: 14 8 seconds     TUG test:                                                                     10 Meter Walk Test:  MDC: 4 14 seconds       MDC:  59 ft/sec  Cut off score for Falls                                                  Age Norms  > 13 5 seconds community dwelling adults                20-29; M: 4 56 ft/sec F: 4 62 ft/sec  > 32 2 Frail Elderly                                                     30-39: M 4 76 ft/sec  F: 4 68 ft/sec          40-49: M: 4 79 ft/sec  F: 4 62 ft/sec  6 Minute Walk Test      50-59: M: 4 76 ft/sec  F: 4 56 ft/sec  MDC: 190 feet       60-69: M: 4 56 ft/sec  F: 4 26 ft/sec  Age Norms       70-+    M: 4 36 ft/sec  F: 4 16 ft/sec  60-69:    M: 1876 F: 4649  53-29:    M: 1729 F: 8654  80-89 +: M: 80 F; 1286     Plan  Patient would benefit from: skilled physical therapy  Planned modality interventions: biofeedback, thermotherapy: hydrocollator packs, TENS and cryotherapy  Planned therapy interventions: abdominal trunk stabilization, activity modification, balance, body mechanics training, community reintegration, coordination, flexibility, functional ROM exercises, gait training, graded activity, graded exercise, graded motor, home exercise program, transfer training, therapeutic training, therapeutic exercise, therapeutic activities, stretching, strengthening, sensory integrative techniques, postural training, patient education and neuromuscular re-education  Frequency: 2x week  Duration in weeks: 12  Plan of Care beginning date: 1/21/2019  Plan of Care expiration date: 4/15/2019  Treatment plan discussed with: patient        Subjective Evaluation    History of Present Illness  Mechanism of injury: Pt had CVA in July 2018 and went to rehab following  Pt was receiving outpatient PT servies @ [address] 2x/wk for the past few months, with last visit being in November  Pt had decided to take a break from therapy due to slow progress however wants to re-start again  Pt wants to work on his balance and strength and "get back to normal " Pt is using RW intermittently, presents today without it because he forgot  Pt is home alone from 9 am- 4 pm due to family members being at work  Pt is mostly sitting in recliner chair all day except when he gets up to use the bathroom   No falls over the past yr except for when he had the initial stroke     Quality of life: good    Pain  No pain reported  Current pain ratin  At best pain ratin  At worst pain ratin    Social Support  Steps to enter house: yes  Stairs in house: no   Lives in: [place] house  Lives with: spouse    Treatments  Previous treatment: physical therapy  Patient Goals  Patient goal: "get back to normal" , improve balance and strength         Objective     Functional Assessment     Comments  LLE strength 3+/5  RLE strength 4/5    5x STS= 16 sec  TUG= 14 sec no AD  Cabrera= 38/56  Gait speed= 33 ft / 10 sec = 3 3 ft/sec  6MWT= 900 ft   DGI=     Gait assessment: decreased arm swing, inconsistent lubna       Flowsheet Rows      Most Recent Value   PT/OT G-Codes   Current Score  45   Projected Score  60   FOTO information reviewed  Yes   Assessment Type  Evaluation   G code set  Mobility: Walking & Moving Around   Mobility: Walking and Moving Around Current Status ()  CK   Mobility: Walking and Moving Around Goal Status ()  CJ          Precautions: falls, dysarthria    Daily Treatment Diary     Manual                                                                                   Exercise Diary              Step ups             Side-stepping foam beam with cone taps                                                                                                                                                                                                                                                           Modalities

## 2019-01-21 NOTE — PROGRESS NOTES
Speech Treatment Note    Today's date: 2019  Patient name: Nubia Landers  : 1960  MRN: [id number]  Referring provider: Raymundo Escamilla MD  Dx:   Encounter Diagnosis     ICD-10-CM    1  Other symbolic dysfunctions V19 5    2  Stroke-like symptoms, with right-sided weakness R29 90                   Visit Number:02  Plan of Care good until: 2019  G-Codes good until: 2019  Pain level: 00/10    Subjective/Behavioral: Seen for initial therapy session  Evaluation and goals reviewed  Patient admits to word finding errors and would like to be able to better express himself verbally  Short-term goals:     Goal 1: Patient will be educated on the use of internal and external memory aids and compensatory strategies with 80% accuracy to facilitate increased recall of routine, personal information, and recent events, to be achieved in 4-6 weeks    Not addressed this session  Goal 2: Patient will demonstrate functional use of external memory across 3 sessions with 80% accuracy to facilitate carryover of strategies in functional living environment, to be achieved in 4-6 weeks    Not addressed this session  Goal 3: Patient will complete auditory immediate and short term memory tasks to 80% accuracy to facilitate increased ability to retell narratives and recall information within functional living environment, to be achieved in 4-6 weeks    Not addressed this session  Goal 4: Patient will complete thought organization tasks (e g , sequencing, deduction puzzles, etc ) with 80% accuracy to facilitate increased executive functioning skills, to be achieved in 4-6 weeks      Goal 5: Patient will complete reading comprehension tasks (e g , answering questions, following complex written directions, etc ) to 80% accuracy to facilitate carryover of comprehension of functional reading materials, to be achieved in 4-6 weeks    Not addressed this session     Goal 6: Patient will complete word generation tasks (e g , analogies, category matrices, etc ) with 80% accuracy using word finding strategies to facilitate improved word retrieval skills, to be achieved in 4-6 weeks  Patient completed concrete categorization (add to the category) with 73%  He was able to generate concrete categories 70% accuracy (foods)  Lastly, he was able to list simple level word opposites with 75% accuracy  Goal 7:Patient will name up to 5 features to describe a person/place/thing with 80% accuracy to facilitate improved utilization of circumlocution strategy, to be achieved in 4-6 weeks      Not addressed this session  Goal 8: Patient will perform oral motor and diadochokinetic speech rate exercises with > 90% accuracy to facilitate increased speech intelligibility, to be achieved in 4-6 weeks    Not addressed this session  Other:Discussed session and patient progress with caregiver/family member after today's session    Recommendations:Continue with Plan of Care

## 2019-01-22 ENCOUNTER — APPOINTMENT (OUTPATIENT)
Dept: SPEECH THERAPY | Facility: CLINIC | Age: 59
End: 2019-01-22
Payer: COMMERCIAL

## 2019-01-23 ENCOUNTER — OFFICE VISIT (OUTPATIENT)
Dept: SPEECH THERAPY | Facility: CLINIC | Age: 59
End: 2019-01-23
Payer: COMMERCIAL

## 2019-01-23 DIAGNOSIS — R48.8 OTHER SYMBOLIC DYSFUNCTIONS: Primary | ICD-10-CM

## 2019-01-23 PROCEDURE — 92507 TX SP LANG VOICE COMM INDIV: CPT

## 2019-01-23 NOTE — PROGRESS NOTES
Daily Speech Treatment Note    Today's date: 2019  Patients name: Jr Lara  : 1960  MRN: [id number]  Safety measures: N/A  Referring provider: Wesley Rubio MD    Primary Diagnosis/Billing code: W78 4      Visit Tracking:  -Referring provider: Epic  -Billing guidelines: AMA  -Visit #3/30   -CBC  no secondary  (no authorization required)  -RE due 2019  Subjective/Behavioral:  -Patient reports that he is well on this date  Objective/Assessment:  -Patient did not have HEP from previous session  Short-term goals:  Goal 1: Patient will be educated on the use of internal and external memory aids and compensatory strategies with 80% accuracy to facilitate increased recall of routine, personal information, and recent events, to be achieved in 4-6 weeks  --See data under goal 6       Goal 2: Patient will demonstrate functional use of external memory across 3 sessions with 80% accuracy to facilitate carryover of strategies in functional living environment, to be achieved in 4-6 weeks  --Did not target in this session       Goal 3: Patient will complete auditory immediate and short term memory tasks to 80% accuracy to facilitate increased ability to retell narratives and recall information within functional living environment, to be achieved in 4-6 weeks  --Did not target in this session       Goal 4: Patient will complete thought organization tasks (e g , sequencing, deduction puzzles, etc ) with 80% accuracy to facilitate increased executive functioning skills, to be achieved in 4-6 weeks  --Patient was presented with a category and ten scrambled words  He was asked to unscramble the words to identify category members  Task completed in 2/10 opps, increased to 10/10 opps given moderate semantic cues from the clinician  Increased time required to complete task  This task appeared to be difficult for patient as it required both word-finding skills and sequencing skills   Recommend targeting skills separately in subsequent sessions      Goal 5: Patient will complete reading comprehension tasks (e g , answering questions, following complex written directions, etc ) to 80% accuracy to facilitate carryover of comprehension of functional reading materials, to be achieved in 4-6 weeks  --Did not target in this session       Goal 6: Patient will complete word generation tasks (e g , analogies, category matrices, etc ) with 80% accuracy using word finding strategies to facilitate improved word retrieval skills, to be achieved in 4-6 weeks  --Patient was verbally presented with a category and a letter, asked to generate an item which fit both criteria  Task completed in 21/32 opps, increased to 31/32 opps given verbal semantic prompts  Attempted to educate patient on internal memory strategy (visualization) to remember categories  However, patient demonstrated difficulty with memory task, thus, memory task was discontinued and focus was word-finding      --Patient was given category matrix and asked to complete (given category and initial letter in written format)  Task completed in 8/16 opps, increased to 14/16 opps given verbal prompts       Goal 7:Patient will name up to 5 features to describe a person/place/thing with 80% accuracy to facilitate improved utilization of circumlocution strategy, to be achieved in 4-6 weeks    --Did not target in this session       Goal 8: Patient will perform oral motor and diadochokinetic speech rate exercises with > 90% accuracy to facilitate increased speech intelligibility, to be achieved in 4-6 weeks  --Did not target in this session  Plan:  -Continue with current plan of care

## 2019-01-24 ENCOUNTER — OFFICE VISIT (OUTPATIENT)
Dept: PHYSICAL THERAPY | Facility: CLINIC | Age: 59
End: 2019-01-24
Payer: COMMERCIAL

## 2019-01-24 ENCOUNTER — APPOINTMENT (OUTPATIENT)
Dept: SPEECH THERAPY | Facility: CLINIC | Age: 59
End: 2019-01-24
Payer: COMMERCIAL

## 2019-01-24 DIAGNOSIS — I63.9 CEREBROVASCULAR ACCIDENT (CVA), UNSPECIFIED MECHANISM (HCC): Primary | ICD-10-CM

## 2019-01-24 PROCEDURE — 97112 NEUROMUSCULAR REEDUCATION: CPT | Performed by: PHYSICAL THERAPIST

## 2019-01-24 PROCEDURE — 97110 THERAPEUTIC EXERCISES: CPT | Performed by: PHYSICAL THERAPIST

## 2019-01-24 PROCEDURE — 97116 GAIT TRAINING THERAPY: CPT | Performed by: PHYSICAL THERAPIST

## 2019-01-24 NOTE — PROGRESS NOTES
Daily Note     Today's date: 2019  Patient name: Godfrey Henry  : 1960  MRN: [id number]  Referring provider: Jose R Ambrose MD  Dx:   Encounter Diagnosis     ICD-10-CM    1  Cerebrovascular accident (CVA), unspecified mechanism ([facility] [address] ) I63 9                 Subjective: Patient reports that he is tired and that he feels weak  Objective: See treatment diary below  Solo Step  - Marching with alternating hand taps: 1 cycles  - Marching with same side hand taps (FWD/BWD): 4 cycles  - Sit to Stands: PT block left foot, 20 reps  - 9" Hurdles (FWD/LAT/BWD): 3 cycles each,     NuStep: 10 minutes, Level 2    - Placement and education on " heel lift      Assessment: Patient was able to tolerate treatment session well today with initiation of his exercise program  He demonstrated increased difficulty coordinating alternating movements and requires PT demonstration throughout exercise  During lateral and backwards alexander negotiation, patient required light hand hold assist to maintain stability  He requires frequent verbal cues to avoid hip ER when performing lateral hurdles and to stay on task  Patient will continue to benefit from skilled outpatient PT services to improve his balance and mobility to maximize his function  Plan: Continue per plan of care        Precautions:

## 2019-01-24 NOTE — PROGRESS NOTES
Diabetic shoe form completed by Dr Shelly Odell and faxed to [facility]; podiatry note obtained

## 2019-01-28 ENCOUNTER — HOSPITAL ENCOUNTER (EMERGENCY)
Facility: HOSPITAL | Age: 59
Discharge: HOME | End: 2019-01-28
Attending: EMERGENCY MEDICINE | Admitting: EMERGENCY MEDICINE
Payer: COMMERCIAL

## 2019-01-28 ENCOUNTER — APPOINTMENT (EMERGENCY)
Dept: RADIOLOGY | Facility: HOSPITAL | Age: 59
End: 2019-01-28
Payer: COMMERCIAL

## 2019-01-28 ENCOUNTER — APPOINTMENT (OUTPATIENT)
Dept: SPEECH THERAPY | Facility: CLINIC | Age: 59
End: 2019-01-28
Payer: COMMERCIAL

## 2019-01-28 ENCOUNTER — TELEPHONE (OUTPATIENT)
Dept: INTERNAL MEDICINE CLINIC | Facility: CLINIC | Age: 59
End: 2019-01-28

## 2019-01-28 VITALS
TEMPERATURE: 97.5 F | DIASTOLIC BLOOD PRESSURE: 69 MMHG | OXYGEN SATURATION: 98 % | HEART RATE: 80 BPM | RESPIRATION RATE: 20 BRPM | SYSTOLIC BLOOD PRESSURE: 159 MMHG

## 2019-01-28 DIAGNOSIS — Z79.4 TYPE 2 DIABETES MELLITUS WITH HYPOGLYCEMIA WITHOUT COMA, WITH LONG-TERM CURRENT USE OF INSULIN (HCC): ICD-10-CM

## 2019-01-28 DIAGNOSIS — E11.65 TYPE 2 DIABETES MELLITUS WITH HYPERGLYCEMIA, UNSPECIFIED WHETHER LONG TERM INSULIN USE (HCC): Chronic | ICD-10-CM

## 2019-01-28 DIAGNOSIS — E11.649 TYPE 2 DIABETES MELLITUS WITH HYPOGLYCEMIA WITHOUT COMA, WITH LONG-TERM CURRENT USE OF INSULIN (HCC): ICD-10-CM

## 2019-01-28 DIAGNOSIS — S92.522A DISPLACED FRACTURE OF MIDDLE PHALANX OF LEFT LESSER TOE(S), INITIAL ENCOUNTER FOR CLOSED FRACTURE: Primary | ICD-10-CM

## 2019-01-28 PROCEDURE — 99283 EMERGENCY DEPT VISIT LOW MDM: CPT

## 2019-01-28 PROCEDURE — 73630 X-RAY EXAM OF FOOT: CPT

## 2019-01-28 RX ORDER — OXYCODONE HYDROCHLORIDE AND ACETAMINOPHEN 5; 325 MG/1; MG/1
1 TABLET ORAL ONCE
Status: COMPLETED | OUTPATIENT
Start: 2019-01-28 | End: 2019-01-28

## 2019-01-28 RX ADMIN — OXYCODONE AND ACETAMINOPHEN 1 TABLET: 5; 325 TABLET ORAL at 15:51

## 2019-01-28 NOTE — TELEPHONE ENCOUNTER
[facility] is requesting alternative for BD ins syr uf 0 3ml, 4 times a day quantity 400   need prior authorization

## 2019-01-28 NOTE — DISCHARGE INSTRUCTIONS
ßÓÑ ÚÙÇã ÇáÞÏã áÏì ÇáÈÇáÛíä   ãÇ íÌÈ Úáíß ãÚÑÝÊå:   ßÓÑ ÇáÞÏã åæ ÍÇáÉ ãÑÖíÉ íÊÚÑÖ ÝíåÇ ÇáÔÎÕ áßÓÑ Ýí æÇÍÏÉ Ãæ ÃßËÑ ãä ÚÙÇã ÇáÞÏã  æÊäÊÌ ßÓæÑ ÇáÞÏã ÈÔßá ÔÇÆÚ Úä ÇáÕÏãÇÊ Ãæ ÇáÓÞæØ Ãæ ÅÕÇÈÇÊ ÇáÅÌåÇÏ ÇáãÊßÑÑ  ÅÑÔÇÏÇÊ [id number] ÎÇÑÌ ÇáãÓÊÔÝì:   ÇáÃÏæíÉ:   · ÇáãÖÇÏÇÊ ÇáÍíæíÉ:  íÊã ÅÚØÇÁ åÐÇ ÇáÏæÇÁ ááãÓÇÚÏÉ Úáì ÇáÚáÇÌ ãä ÚÏæì äÇÊÌÉ Úä ÇáÈßÊíÑíÇ Ãæ ÇáæÞÇíÉ ãäåÇ  · ÇáÃÏæíÉ ÇááÇÓÊíÑæíÏíÉ ÇáãÖÇÏÉ ááÇáÊåÇÈ (NSAID):  ÊÓÇÚÏ åÐå ÇáÃÏæíÉ Úáì ÊÞáíá ÇáÊæÑã æÇáÃáã  ÊÊæÝÑ ÇáÃÏæíÉ ÇááÇÓÊíÑæíÏíÉ ÇáãÖÇÏÉ ááÇáÊåÇÈ Ïæä ÃãÑ ãä ØÈíÈ  ÇÓÊÝÓÑ Úä ÇáÏæÇÁ ÇáãäÇÓÈ áß  ÇÓÊÝÓÑ Úä ßãíÉ ÇáÏæÇÁ ÇáÊí [id number] ææÞÊ ZXLOICA  VSMQQ ÇáÏæÇÁ æÝÞÇ ááÅÑÔÇÏÇÊ  ÞÏ ÊÊÓÈÈ ÇáÃÏæíÉ ÇááÇÓÊíÑæíÏíÉ ÇáãÖÇÏÉ ááÇáÊåÇÈ (NSAID) Ýí ÍÏæË äÒíÝ ÈÇáãÚÏÉ æãÔßáÇÊ EDLIXR ÅÐÇ áã íÊã ÊäÇæáåÇ ÈÔßá ÕÍíÍ  · ÃÏæíÉ ÊÎÝíÝ ÇáÃáã:  íãßä ÅÚØÇÄß æÕÝÉ ÏæÇÁ áÊÎÝíÝ ÇáÃáã  áÇ ÊäÊÙÑ ÍÊì íÕÈÍ ÇáÃáã ÍÇÏðÇ áÊäÇæá åÐÇ ÇáÏæÇÁ  · ÊäÇæá ÇáÏæÇÁ æÝÞðÇ ááÅÑÔÇÏÇÊ  ÇÊÕá ÈãÞÏã ÇáÑÚÇíÉ ÇáÕÍíÉ áÏíß ÅÐÇ ßäÊ ÊÚÊÞÏ Ãä ÇáÏæÇÁ ÇáÐí [id number] áÇ íÝíÏß Ãæ ÅÐÇ ßäÊ ÊÚÇäí ãä ÂËÇÑ ÌÇäÈíÉ  ÃÎÈÑå Ãæ ÃÎÈÑåÇ ÅÐÇ ßÇäÊ áÏíß ÍÓÇÓíÉ ãä Ãí ÏæÇÁ  ÇÍÊÝÙ ÈÞÇÆãÉ ÇáÃÏæíÉ æÇáÝíÊÇãíäÇÊ [id number] ÇáÊí ÊÊäÇæáåÇ  ÃÏÑöÌ ÝíåÇ ÌÑÚÇÊ ÇáÃÏæíÉ æãæÇÚíÏåÇ æÓÈÈ ÊäÇæáåÇ  ÃÍÖöÑ ãÚß åÐå ÇáÞÇÆãÉ Ãæ ÚÈæÇÊ ÇáÃÞÑÇÕ ÚäÏ ÒíÇÑÇÊ ÇáãÊÇÈÚÉ  Samy Keto ÞÇÆãÉ ÇáÃÏæíÉ ãÚß ÊÍÓÈðÇ áÍÇáÇÊ ÇáØæÇÑÆ  íÌÈ ÇáãÊÇÈÚÉ ãÚ ÇÎÊÕÇÕí ÇáÚÙÇã Ãæ ãÞÏã ÇáÑÚÇíÉ ÇáÕÍíÉ áÏíß æÝÞðÇ [id number] ÇáÊÇáíÉ:  ÞÏ ÊÍÊÇÌ Åáì ÇáÚæÏÉ áÅÒÇáÉ ÇáÌÈíÑÉ Ãæ ÖãÇÏÉ ÇáÌÈÓ Ãæ æÓÇÆá ÇáÊËÈíÊ ÇáÎÇÑÌíÉ Ãæ ÇáÛÑÒ  æÞÏ ÊÍÊÇÌ ÃíÖðÇ Åáì ÇáÚæÏÉ áÅÌÑÇÁ ÝÍæÕÇÊ ááÊÃßÏ ãä ãÏì ÔÝÇÁ ÞÏãß  íõÑÌì ÊÏæíä ÃÓÆáÊß áÊÊÐßøÑ ØÑÍåÇ ÃËäÇÁ ÒíÇÑÇÊß ááØÈíÈ  ÇáÚäÇíÉ ÈÇáãÔÇÈß:  ÅÐÇ ßÇäÊ áÏíß ãÓÇãíÑ Ýí ÞÏãß¡ IVVUNIY Åáì ÊäÙíÝåÇ ÈÔßá íæãí  æíãßä Ãä íÓÇÚÏ ÊäÙíÝ Êáß ÇáãÓÇãíÑ Úáì [id number] Ïæä ÇáÅÕÇÈÉ ÈÇáÚÏæì  ÇÓÊÝÓÑ Úä ÇáãÒíÏ ãä ETEYYRJOL Úä ÑÚÇíÉ ÇáãÓÇãíÑ  ÇáÚäÇíÉ ÈÇáÌÑæÍ:  ÇÛÓá ÇáÌÑÍ ÈÇáãÇÁ æÇáÕÇÈæä ÈÚäÇíÉ  Þã ÈÊÌÝíÝ ÇáãäØÞÉ æÖÚ ÖãÇÏÇÊ ÌÏíÏÉ æäÙíÝÉ ÍÓÈ ÇáÅÑÔÇÏÇÊ  Þã ÈÊÛííÑ ÖãÇÏÇÊß ßáãÇ ÇÈÊáÊ Ãæ ÇÊÓÎÊ    ÇáÑÚÇíÉ ÇáÐÇÊíÉ:   · ÇáÑÇÍÉ:  ÞÏ ÊÍÊÇÌ áÅÑÇÍÉ ÞÏãß æÊÌäÈ ÇáÃäÔØÉ ÇáÊí ÊÓÈÈ ÇáÃáã  ÈÇáäÓÈÉ ááßÓæÑ ÇáäÇÌãÉ Úä ÇáÅÌåÇÏ¡ ÓÊÍÊÇÌ Åáì ÊÌäÈ ÇáÃäÔØÉ ÇáÊí ÊÓÈÈÊ Ýí ÍÏæË ÇáßÓÑ Åáì Ãä íÔÝì  ÇÓÊÝÓÑ Úä ÇáãæÚÏ ÇáãäÇÓÈ ááÚæÏÉ Åáì ããÇÑÓÉ ÃäÔØÊß ÇáãÚÊÇÏÉ ãËá ÇáÚãá æÇáÑíÇÖÉ  · ÇáËáÌ:  íÓÇÚÏ ÇáËáÌ Úáì ÊÞáíá ÇáÊæÑã æÇáÃáã  æÞÏ íÓÇÚÏ ÇáËáÌ ÃíÖðÇ Úáì ãäÚ ÍÏæË ÊáÝ Ýí ÇáÃäÓÌÉ  ÇÓÊÎÏã ßíÓ ËáÌ Ãæ ÖÚ ËáÌðÇ ãÌÑæÔðÇ Ýí ßíÓ ãä ÇáÈáÇÓÊíß  Þã ÈÊÛØíÊå ÈãäÔÝÉ æÖÚå Úáì ÞÏãß áãÏÉ ÊÊÑÇæÍ Èíä 15 Åáì 20 ÏÞíÞÉ ßá ÓÇÚÉ æÝÞðÇ ááÅÑÔÇÏÇÊ  · ÇÑÝÚ ÞÏãß:  ÇÑÝÚ ÞÏãß ÚäÏ Ãæ ACMB ãä ãÓÊæì HMGUZ ÞÏÑ ÇáÅãßÇä  ÓíÓÇÚÏ Ðáß Úáì ÊÎÝíÝ ÇáÃáã æÇáÊæÑã  ÖÚ ÞÏãß Úáì æÓÇÏÇÊ Ãæ ÈØÇØíä ááÅÈÞÇÁ ÚáíåÇ ãÑÊÝÚÉ ÈÔßá ãÑíÍ  · AXGJGQ ÇáØÈíÚí:  ÈãÌÑÏ ÔÝÇÁ ÞÏãß¡ íãßä Ãä íÚáãß ÃÎÕÇÆí ÚáÇÌ ØÈíÚí ÊãÑíäÇÊ ááãÓÇÚÏÉ Ýí ÊÍÓíä FZQTUR IMECMN¡ Åáì ÌÇäÈ ÊÎÝíÝ ÇáÃáã  ÇáÚäÇíÉ ÈÖãÇÏÉ ÇáÌÈÓ Ãæ ÇáÌÈíÑÉ:   · ÇÝÍÕ ÇáÌáÏ Íæá ÇáÖãÇÏÉ æÇáÌÈíÑÉ ÈÔßá íæãí ÈÍËðÇ Úä Ãí ÇÍãÑÇÑ Ãæ ÃíÉ ãäÇØÞ ãÝÊæÍÉ  · áÇ ÊÍÇæá ÇÓÊÎÏÇã ÇáÃÔíÇÁ DTADMU Ãæ ÇáãÏÈÈÉ áÎÏÔ ÇáÌáÏ ÊÍÊ ÇáÖãÇÏÉ Ãæ ÇáÌÈíÑÉ  · áÇ ÊÞã ÈÅÒÇáÉ ÇáÌÈíÑÉ ÅáÇ ÈÚÏ ãæÇÝÞÉ ãÞÏã ÇáÑÚÇíÉ ÇáÕÍíÉ Ãæ ÌÑÇÍ ÇáÚÙÇã Úáì Ðáß  [id number] ÃËäÇÁ ÇÑÊÏÇÁ ÖãÇÏÉ Ãæ ÌÈíÑÉ:  ÊÌäÈ Èáø ÇáÌÈíÑÉ Ãæ ÇáÌÈÓ  ÞÈá [id number]¡ Þã ÈÊÛØíÉ ÇáÖãÇÏÉ Ãæ ÇáÌÈíÑÉ ÈßíÓ ãä ÇáÈáÇÓÊíß  ÇÑÈØ ÇáßíÓ Úáì ÌáÏß ÃÚáì ÇáÖãÇÏÉ Ãæ ÇáÌÈíÑÉ áãäÚ ÊÓÑÈ ÇáãíÇå ÅáíåÇ  ÃÎÑÌ ÞÏãß ãä ÇáãíÇå Ýí ÍÇáÉ ÊÓÑÈ ÇáãíÇå Åáì ÇáßíÓ  ÇÓÊÝÓÑ Úä ãæÚÏ [id number] Ãæ ÃÎÐ ÇáÏÔ  ÇáÃÌåÒÉ ÇáãÓÇÚÏÉ:  íãßä Ãä ÊõÚØì ÍÐÇÁð áå äÚá ÕáÈ áÇÑÊÏÇÆå ÃËäÇÁ ÔÝÇÁ ÇáÞÏã  ßãÇ ÞÏ ÊÍÊÇÌ áÇÓÊÎÏÇã ÇáÚßÇÒíä áãÓÇÚÏÊß Úáì ÇáÓíÑ ÃËäÇÁ ÝÊÑÉ ÔÝÇÁ ÞÏãß  ãä Çáãåã Ãä ÊÓÊÎÏã ÇáÚßÇÒíä ÈÔßá ÕÍíÍ  ÇØáÈ PTCAKM Úáì ãÒíÏ ãä URSNBOZGN Úä ßíÝíÉ ÇÓÊÎÏÇã ÇáÚßÇÒíä  ÇÊÕá ÈãÞÏã ÇáÑÚÇíÉ ÇáÕÍíÉ áÏíß Ãæ ÈÇÎÊÕÇÕí ÇáÚÙÇã ÅÐÇ:   · ÇáÅÕÇÈÉ ÈÇáÍãì    · ßÇäÊ áÏíß ÊÞÑÍÇÊ ÌÏíÏÉ Íæá ÇáÞÏã Ãæ ÇáÌÈíÑÉ Ãæ ÇáÖãÇÏÉ  · ÊÚÇäí ãä ÕÚæÈÉ ÌÏíÏÉ Ãæ ãÊÝÇÞãÉ Ýí ÊÍÑíß ÇáÞÏã  · áÇÍÙÊ ÑÇÆÍÉ ßÑíåÉ ÊÎÑÌ ãä ÊÍÊ ÇáÌÈíÑÉ  · ÊÚÑÖ ÇáÍÐÇÁ Ãæ ÇáÖãÇÏÉ Ãæ ÇáÌÈíÑÉ ááÊÖÑÑ      · ÅÐÇ ßÇäÊ áÏíß ZBREWFBRF Ãæ ãÎÇæÝ KPTSME ÈÍÇáÊß ÇáãÑÖíøÉ Ãæ ÈÇáÑÚÇíÉ  ÇØáÈ ÇáÑÚÇíÉ ÝæÑðÇ Ãæ ÇÊÕá ÈÇáÑÞã 911 Ýí ÍÇáÉ:   · ÊÝÇÞã ÇáÃáã Ýí ÞÏãß ÇáãÕÇÈÉ ÍÊì ÈÚÏ ÇáÑÇÍÉ æÊäÇæá ÇáÃÏæíÉ ÇáãÎÝÝÉ ááÃáã  · ÊÚÑÖ ÌáÏ Ãæ ÃÕÇÈÚ ÞÏãß ááÎÏÑ Ãæ ÇáÊæÑã Ãæ ÇáÈÑæÏÉ Ãæ ÃÕÈÍ ÃÈíÖ Ãæ ÃÒÑÞ Çááæä  · ßäÊ ÊÚÇäí ãä ÒíÇÏÉ ÇáÃáã Ãæ ÇáÊæÑã ÚãÇ ßÇä Úáíå ÇáÃãÑ ÞÈá æÖÚ ÇáÌÈíÑÉ  · ßÇä ÌÑÍß íÎÑÌ ãäå ÓÇÆá Ãæ ÞíÍ  · ßÇä ÇáÏã íÊÓÑÈ ãä ÖãÇÏÊß  · ÊÔÚÑ ÈÓÎæäÉ æÖÚÝ æÃáã Ýí ÓÇÞß  ÞÏ ÊÈÏæ ãÊæÑãÉ æÍãÑÇÁ  · ÇáÔÚæÑ ÈÏæÎÉ æÖíÞ Ýí ÇáÊäÝÓ ÈÔßá ãÝÇÌÆ  · ÇáãÚÇäÇÉ ãä Ãáã Ýí ÇáÕÏÑ ÚäÏ ÇáÊäÝÓ ÈÚãÞ Ãæ ÇáÓÚÇá  ÞÏ íÎÑÌ ÇáÓÚÇá ãÕÍæÈðÇ ÈÏã  © 2017 [address] [facility] Information is for End User's use only and may not be sold, redistributed or otherwise used for commercial purposes  All illustrations and images included in CareNotes® are the copyrighted property of [employer]  or Vahid Jacobo  The above information is an  only  It is not intended as medical advice for individual conditions or treatments  Talk to your doctor, nurse or pharmacist before following any medical regimen to see if it is safe and effective for you

## 2019-01-28 NOTE — PROGRESS NOTES
Daily Speech Treatment Note    Today's date: 2019  Patients name: Dennis Singh  : 1960  MRN: [id number]  Safety measures: N/A  Referring provider: Malathi Skelton MD    Primary Diagnosis/Billing code: S30 8      Visit Tracking:  -Referring provider: Epic  -Billing guidelines: AMA  -Visit #   -CBC  no secondary  (no authorization required)  -RE due 2019  Subjective/Behavioral:  -Patient reports that he is well on this date  Objective/Assessment:  -Patient did not have HEP from previous session  Short-term goals:  Goal 1: Patient will be educated on the use of internal and external memory aids and compensatory strategies with 80% accuracy to facilitate increased recall of routine, personal information, and recent events, to be achieved in 4-6 weeks  --See data under goal 6       Goal 2: Patient will demonstrate functional use of external memory across 3 sessions with 80% accuracy to facilitate carryover of strategies in functional living environment, to be achieved in 4-6 weeks  --Did not target in this session       Goal 3: Patient will complete auditory immediate and short term memory tasks to 80% accuracy to facilitate increased ability to retell narratives and recall information within functional living environment, to be achieved in 4-6 weeks  --Did not target in this session       Goal 4: Patient will complete thought organization tasks (e g , sequencing, deduction puzzles, etc ) with 80% accuracy to facilitate increased executive functioning skills, to be achieved in 4-6 weeks  --Not addressed this session  Goal 5: Patient will complete reading comprehension tasks (e g , answering questions, following complex written directions, etc ) to 80% accuracy to facilitate carryover of comprehension of functional reading materials, to be achieved in 4-6 weeks    --Did not target in this session       Goal 6: Patient will complete word generation tasks (e g , analogies, category matrices, etc ) with 80% accuracy using word finding strategies to facilitate improved word retrieval skills, to be achieved in 4-6 weeks  --Patient completed concrete categorization (add to the category) with 73%  He was able to generate concrete categories 70% accuracy (foods)  Lastly, he was able to list simple level word opposites with 75% accuracy      Goal 7:Patient will name up to 5 features to describe a person/place/thing with 80% accuracy to facilitate improved utilization of circumlocution strategy, to be achieved in 4-6 weeks    --The idea of verbally describing objects, actions as a way to compensate for word finding errors was discussed with the patient  He was able to describe/list features associated with common objects with  % accuracy       Goal 8: Patient will perform oral motor and diadochokinetic speech rate exercises with > 90% accuracy to facilitate increased speech intelligibility, to be achieved in 4-6 weeks  --Did not target in this session  Plan:  -Continue with current plan of care

## 2019-01-28 NOTE — ED NOTES
Patient returned to room from [address] [place] [address] [address], RN  01/28/19 0822

## 2019-01-28 NOTE — ED PROVIDER NOTES
History  Chief Complaint   Patient presents with    Foot Injury     Pt reports hitting left foot 5th digit last night while walking, pt reports pain and ecchymosis to that digit since, denies open wound  Pt reports being diabetic     Dennis Singh is a 62 y o  male past medical history of diabetes, hypertension, hyperlipidemia, CVA who presents to the ED with complaints of left little toe pain and swelling x2 days  Patient states yesterday he accidentally injured the left 5th digit while walking  Patient states immediately after he noticed bruising and swelling to the area  Patient has not taken any medications prior to arrival   Denies numbness, tingling, erythema, decreased range of motion, open wound, bleeding, chest pain, shortness of breath, fever, chills  Patient and wife states the patient has an appointment with Dr Mona Pierre (podiatrist) tomorrow  History provided by:  Patient and spouse  Leg Pain   Location:  Toe  Time since incident:  2 days  Injury: yes    Toe location:  L little toe  Pain details:     Quality:  Throbbing    Severity:  Moderate    Duration:  2 days    Timing:  Constant    Progression:  Worsening  Associated symptoms: no back pain, no decreased ROM, no fatigue, no fever, no itching, no muscle weakness, no neck pain, no numbness, no stiffness, no swelling and no tingling    Risk factors: obesity        Prior to Admission Medications   Prescriptions Last Dose Informant Patient Reported? Taking?    B-D INS SYRINGE 0 5CC/30GX1/2" 30G X 1/2" 0 5 ML MISC  Spouse/Significant Other No No   Sig: Inject under the skin 4 (four) times a day   Blood Glucose Monitoring Suppl (ONE TOUCH ULTRA MINI) w/Device KIT  Spouse/Significant Other No No   Sig: by Does not apply route 3 (three) times a day   Blood Pressure Monitoring (BLOOD PRESSURE CUFF) MISC  Spouse/Significant Other No No   Sig: Use to check blood pressure before taking blood pressure medication and 1 hour after and follow instructions provided in discharge instructions based on the readings     [employer] GLUCOSE 4-6 GM-MG  Spouse/Significant Other Yes No   Sig: CHEW 3 TABLETS BY MOUTH DAILY AS NEEDED   Incontinence Supplies (MALE URINAL) MISC  Spouse/Significant Other No No   Sig: by Does not apply route daily   Insulin Syringe-Needle U-100 (B-D INS SYR ULTRAFINE  3CC/30G) 30G X 1/2" 0 3 ML MISC  Spouse/Significant Other No No   Sig: by Does not apply route 4 (four) times a day   LUCENTIS 0 3 MG/0 05ML  Spouse/Significant Other Yes No   ONETOUCH DELICA LANCETS 80C MISC  Spouse/Significant Other No No   Sig: by Does not apply route 3 (three) times a day   amLODIPine (NORVASC) 5 mg tablet  Spouse/Significant Other No No   Sig: Take 1 tablet (5 mg total) by mouth every 12 (twelve) hours   aspirin (ECOTRIN LOW STRENGTH) 81 mg EC tablet  Spouse/Significant Other Yes No   Sig: Take 81 mg by mouth daily Resume on 8/14   atorvastatin (LIPITOR) 80 mg tablet  Spouse/Significant Other No No   Sig: Take 1 tablet (80 mg total) by mouth daily with dinner   carvedilol (COREG) 6 25 mg tablet  Spouse/Significant Other No No   Sig: Take 1 tablet (6 25 mg total) by mouth 2 (two) times a day with meals   cholecalciferol 15253 units TABS  Spouse/Significant Other No No   Sig: Take 1 tablet (50,000 Units total) by mouth once a week   cyanocobalamin 1000 MCG tablet  Spouse/Significant Other No No   Sig: Take 1 tablet (1,000 mcg total) by mouth daily   diphenoxylate-atropine (LOMOTIL) 2 5-0 025 mg per tablet  Spouse/Significant Other Yes No   Sig: Take 1 tablet by mouth 4 (four) times a day as needed for diarrhea   escitalopram (LEXAPRO) 5 mg tablet  Spouse/Significant Other No No   Sig: Take 1 tablet (5 mg total) by mouth daily   famotidine (PEPCID) 20 mg tablet  Spouse/Significant Other Yes No   Sig: Take 20 mg by mouth daily Resume on 8/14   gabapentin (NEURONTIN) 250 mg/5 mL solution  Spouse/Significant Other No No   Sig: Take 12 mL (600 mg total) by mouth daily at bedtime glucose 4 g chewable tablet  Spouse/Significant Other No No   Sig: Chew 3 tablets (12 g total) as needed for low blood sugar   Patient not taking: Reported on 2019    glucose blood (ONE TOUCH ULTRA TEST) test strip  Spouse/Significant Other No No   Si each by Other route 3 (three) times a day Use as instructed   insulin aspart (NovoLOG) 100 units/mL injection  Spouse/Significant Other No No   Sig: Inject 4-6 Units under the skin 3 (three) times a day before meals As directed   insulin glargine (LANTUS) 100 units/mL subcutaneous injection  Spouse/Significant Other No No   Sig: Inject 25 Units under the skin daily   lactulose 20 g/30 mL  Spouse/Significant Other No No   Sig: Take 30 mL (20 g total) by mouth daily as needed (constipation)   Patient not taking: Reported on 2018    neomycin-bacitracin-polymyxin b (NEOSPORIN) ointment  Spouse/Significant Other Yes No   Sig: Apply 1 application topically 2 (two) times a day Apply twice per day to shin wound until fully healed  If not fully healed in 5 days contact your family doctor   Next application is the evening of    ondansetron (ZOFRAN-ODT) 4 mg disintegrating tablet  Spouse/Significant Other No No   Sig: Take 1 tablet (4 mg total) by mouth every 6 (six) hours as needed for nausea or vomiting   Patient not taking: Reported on 2019       Facility-Administered Medications: None       Past Medical History:   Diagnosis Date    Diabetes mellitus ([facility] [address] )     Hypercholesteremia     Hyperlipidemia     Hypertension     Neuropathy     Obesity     Osteomyelitis ([facility] [address] )     last assessed 16    PVC's (premature ventricular contractions)     sees cardiology Dr Mahin camargo    Stroke [facility])     last weeof 2018 [address] [facility]       Past Surgical History:   Procedure Laterality Date    ABDOMINAL SURGERY      CHOLECYSTECTOMY      Percutaneous    OTHER SURGICAL HISTORY      "stimulator to control bowel movements"    [place] ESOPHAGOGASTRODUODENOSCOPY TRANSORAL DIAGNOSTIC N/A 9/27/2016    Procedure: ESOPHAGOGASTRODUODENOSCOPY (EGD); Surgeon: Koko Moses MD;  [facility]: [facility]; Service: Gastroenterology    SD LAP,CHOLECYSTECTOMY N/A 2/29/2016    Procedure: LAPAROSCOPIC CHOLECYSTECTOMY ;  Surgeon: Xin Dominguez DO;  [facility];  Service: General    ROTATOR CUFF REPAIR Right     TOE AMPUTATION Right 10/28/2016    Procedure: 3RD TOE AMPUTATION ;  Surgeon: Justin Hartmann DPM;  [facility];  Service:        Family History   Problem Relation Age of Onset    Leukemia Mother     Liver disease Mother     Lung cancer Mother         heavy smoker - 3 ppd    Heart disease Father     Liver disease Father     Multiple myeloma Sister     Breast cancer Sister     Urolithiasis Family     Alcohol abuse Neg Hx     Depression Neg Hx     Drug abuse Neg Hx     Substance Abuse Neg Hx     Mental illness Neg Hx      I have reviewed and agree with the history as documented  Social History   Substance Use Topics    Smoking status: Never Smoker    Smokeless tobacco: Never Used    Alcohol use No        Review of Systems   Constitutional: Negative for appetite change, chills, fatigue, fever and unexpected weight change  HENT: Negative for congestion, drooling, ear pain, rhinorrhea, sore throat, trouble swallowing and voice change  Eyes: Negative for pain, discharge, redness and visual disturbance  Respiratory: Negative for cough, shortness of breath, wheezing and stridor  Cardiovascular: Negative for chest pain, palpitations and leg swelling  Gastrointestinal: Negative for abdominal pain, blood in stool, constipation, diarrhea, nausea and vomiting  Genitourinary: Negative for dysuria, flank pain, frequency, hematuria and urgency  Musculoskeletal: Positive for arthralgias  Negative for back pain, gait problem, joint swelling, neck pain, neck stiffness and stiffness     Skin: Negative for color change, itching and rash  Neurological: Negative for dizziness, seizures, light-headedness and headaches  Physical Exam  Physical Exam   Constitutional: He is oriented to person, place, and time  He appears well-developed and well-nourished  No distress  HENT:   Head: Normocephalic and atraumatic  Right Ear: External ear normal    Left Ear: External ear normal    Nose: Nose normal    Mouth/Throat: Oropharynx is clear and moist    Eyes: Pupils are equal, round, and reactive to light  Conjunctivae and EOM are normal    Neck: Normal range of motion  Neck supple  Cardiovascular: Normal rate, regular rhythm, intact distal pulses and normal pulses  Pulses:       Dorsalis pedis pulses are 2+ on the right side, and 2+ on the left side  Posterior tibial pulses are 2+ on the right side, and 2+ on the left side  Pulmonary/Chest: Effort normal and breath sounds normal    Musculoskeletal: Normal range of motion  Left foot: There is tenderness and swelling  There is normal range of motion  Feet:    There is tenderness palpation the proximal middle phalanx of the left 5th digit, mild edema and ecchymosis, no open wounds, no blister, no ulcer, decreased light touch sensation (patient reports this is his baseline), [facility]  Neurological: He is alert and oriented to person, place, and time  Skin: Skin is warm and dry  Capillary refill takes less than 2 seconds  Nursing note and vitals reviewed        Vital Signs  ED Triage Vitals [01/28/19 1509]   Temperature Pulse Respirations Blood Pressure SpO2   97 5 °F (36 4 °C) 80 20 159/69 98 %      Temp Source Heart Rate Source Patient Position - Orthostatic VS BP Location FiO2 (%)   Oral Monitor Sitting Left arm --      Pain Score       6           Vitals:    01/28/19 1509   BP: 159/69   Pulse: 80   Patient Position - Orthostatic VS: Sitting       Visual Acuity      ED Medications  Medications oxyCODONE-acetaminophen (PERCOCET) 5-325 mg per tablet 1 tablet (1 tablet Oral Given 1/28/19 1551)       Diagnostic Studies  Results Reviewed     None                 XR foot 3+ views LEFT   ED Interpretation by Araceli Enriquez PA-C (01/28 1548)   Displaced fracture of the proximal 5th middle phalanx                  Procedures  Procedures       Phone Contacts  ED Phone Contact    ED Course  ED Course as of Jan 28 1605   Mon Jan 28, 2019   1540 Upon initial evaluation, patient states he is in "severe pain "     [phone] Patient has an appointment with podiatrist tomorrow  Patient will be louann taped and surgical shoe will be applied to affected extremity  46 Educated patient regarding diagnosis and management  Advised patient to follow up with PCP  Advised patient to RTER for persistent or worsening symptoms  MDM  Number of Diagnoses or Management Options  Displaced fracture of middle phalanx of left lesser toe(s), initial encounter for closed fracture: new and does not require workup  Diagnosis management comments: Silvana Reyes is a 62 y o  male past medical history of diabetes, hypertension, hyperlipidemia, CVA who presents to the ED with complaints of left little toe pain and swelling x2 days  Patient states yesterday he accidentally injured the left 5th digit while walking  Patient states immediately after he noticed bruising and swelling to the area  Patient has not taken any medications prior to arrival   Denies numbness, tingling, erythema, decreased range of motion, open wound, bleeding, chest pain, shortness of breath, fever, chills  Patient and wife states the patient has an appointment with Dr Jayro Duncan (podiatrist) tomorrow  X-ray of the left foot significant for mildly displaced proximal middle phalanx fracture of the 5th digit  Patient tolerated louann tape procedure  Patient has an appointment with podiatrist (Dr Jayro Duncan) scheduled for tomorrow per patient wife  I provided patient with strict RTER precautions  I advised patient follow-up with PCP in 24-48 hours  Patient verbalized understanding  Patient Progress  Patient progress: improved    CritCare Time    Disposition  Final diagnoses:   Displaced fracture of middle phalanx of left lesser toe(s), initial encounter for closed fracture     Time reflects when diagnosis was documented in both MDM as applicable and the Disposition within this note     Time User Action Codes Description Comment    1/28/2019  3:51 PM Dorethea Scheuermann Add [[id number]] Displaced fracture of middle phalanx of left lesser toe(s), initial encounter for closed fracture       ED Disposition     ED Disposition Condition Comment    Discharge  [address] discharge to home/self care  Condition at discharge: Good        Follow-up Information     Follow up With Specialties Details Why Contact Info Additional [address] Emergency Department Emergency Medicine Go to If symptoms worsen [address] [facility]  [facility] ED, [address], [address] [place] [address],  Internal Medicine Schedule an appointment as soon as possible for a visit  [address] [place] [address],[address]  [address] [address]       Justina Tucker, DPM Podiatry Go in 1 day  [address]  [phone]             Patient's Medications   Discharge Prescriptions    No medications on file     No discharge procedures on file      ED Provider  Electronically Signed by           Nicolle Alicea PA-C  01/28/19 9946

## 2019-01-29 ENCOUNTER — APPOINTMENT (OUTPATIENT)
Dept: SPEECH THERAPY | Facility: CLINIC | Age: 59
End: 2019-01-29
Payer: COMMERCIAL

## 2019-01-29 DIAGNOSIS — Z79.4 TYPE 2 DIABETES MELLITUS WITH HYPOGLYCEMIA WITHOUT COMA, WITH LONG-TERM CURRENT USE OF INSULIN (HCC): ICD-10-CM

## 2019-01-29 DIAGNOSIS — E11.649 TYPE 2 DIABETES MELLITUS WITH HYPOGLYCEMIA WITHOUT COMA, WITH LONG-TERM CURRENT USE OF INSULIN (HCC): ICD-10-CM

## 2019-01-30 ENCOUNTER — HOSPITAL ENCOUNTER (OUTPATIENT)
Dept: RADIOLOGY | Facility: HOSPITAL | Age: 59
Discharge: HOME | End: 2019-01-30
Payer: COMMERCIAL

## 2019-01-30 ENCOUNTER — OFFICE VISIT (OUTPATIENT)
Dept: NEUROLOGY | Facility: CLINIC | Age: 59
End: 2019-01-30
Payer: COMMERCIAL

## 2019-01-30 VITALS
DIASTOLIC BLOOD PRESSURE: 82 MMHG | BODY MASS INDEX: 36.45 KG/M2 | SYSTOLIC BLOOD PRESSURE: 118 MMHG | HEIGHT: 68 IN | WEIGHT: 240.5 LBS

## 2019-01-30 DIAGNOSIS — M54.2 NECK PAIN ON LEFT SIDE: ICD-10-CM

## 2019-01-30 DIAGNOSIS — M54.2 NECK PAIN ON LEFT SIDE: Primary | ICD-10-CM

## 2019-01-30 DIAGNOSIS — N18.32 CHRONIC KIDNEY DISEASE (CKD) STAGE G3B/A1, MODERATELY DECREASED GLOMERULAR FILTRATION RATE (GFR) BETWEEN 30-44 ML/MIN/1.73 SQUARE METER AND ALBUMINURIA CREATININE RATIO LESS THAN 30 MG/G (HCC): ICD-10-CM

## 2019-01-30 DIAGNOSIS — R80.9 PROTEINURIA, UNSPECIFIED TYPE: Primary | ICD-10-CM

## 2019-01-30 PROCEDURE — 99214 OFFICE O/P EST MOD 30 MIN: CPT | Performed by: PHYSICAL MEDICINE & REHABILITATION

## 2019-01-30 PROCEDURE — 72040 X-RAY EXAM NECK SPINE 2-3 VW: CPT

## 2019-01-30 NOTE — TELEPHONE ENCOUNTER
Reviewed blood sugar log, blood sugars are elevated overall in 200-250  Please inform patient  To increase Lantus to 30 units at bedtime  To increase NovoLog 8 units with meals plus scale  Scale - 150-200 -2 units, 201-250-4 units, 251-300 -6 units, 301-350-8 units, > 350- 10 units     He should send log in 2 weeks  Please make sure that he is taking NovoLog and not NovoLog 70/30

## 2019-01-30 NOTE — TELEPHONE ENCOUNTER
Pt is using Novolog 70/30  Gave instructions to pt's wife who understood  Will give pt call back if you have another change

## 2019-01-30 NOTE — PATIENT INSTRUCTIONS
Take Tylenol 500mg by mouth up to 3 times a day as needed for severe pain  Try ICE for the left foot pain

## 2019-01-30 NOTE — PROGRESS NOTES
Physical Medicine & Rehabilitation Follow-up Note  Jr Jane 62 y o  male    ASSESSMENT/PLAN:     26-year-old male with a history of diabetes mellitus, hypertension, hyperlipidemia, and history of left lacunar ischemic stroke sustained in 2018 who presents today for follow-up  Patient's chief complaint today is left-sided neck pain  He states a 2 day duration  His wife reports she believes it is because he sits in his recliner for multiple hours during the day and leans back in the chair to watch television  He is not very active outside of his therapy sessions  There is no trauma related to the neck  However recently patient was diagnosed with a 5th toe fracture after hitting his foot and toes will getting up in the middle of the night to use the bathroom  He states his daughter forgot to leave the hallway light on  He is currently in a Darco shoe and follows with podiatry  On examination today  Patient is limited cervical range of motion in rotation as well as lateral bending bilaterally  Both of these motions are provocative of his left-sided neck pain additionally  On palpation patient has tight musculature specifically in his splenius capitis and upper trapezius which when palpated reproduces exact symptoms  Patient may also have some irritation of the greater occipital nerve on the left side additionally as he is radiating symptoms towards the scalp as well as the eye  Plan:  -Left-sided neck pain:  Appears myofascial based on examination however x-ray C-spine order to view alignment  Prescription provided for topical Voltaren gel to use t i d  to the left side of the neck  Strongly advised patient to increase his physical activity at home including sitting upright in his recliner and taking scheduled walks around the home with or without the supervision of his wife  I suggested he set an alarm as a reminder to walk    Patient may also benefit from topical heat and gentle range of motion in physical therapy which can be related to his therapist   -left foot and toe pain:   Suggested extra-strength Tylenol 500 mg maximum 3 times a day if needed for severe pain only  -return to clinic in 6 weeks for follow-up of neck pain    Diagnoses and all orders for this visit:    Neck pain on left side  -     XR spine cervical 2 or 3 vw injury; Future  -     diclofenac sodium (VOLTAREN) 1 %; Apply 2 g topically 3 (three) times a day      *I have spent 30 minutes with Patient and family today in which greater than 50% of this time was spent in counseling/coordination of care regarding Intructions for management, Patient and family education and Impressions  HPI/SUBJECTIVE:  Patient presents today in the office with chief complaint of left-sided neck pain, acute  His wife is accompanying him to the appointment today  Patient is seen ambulating into the office without any assistive devices  Patient states this began 2 days ago unrelated to trauma  It started all of a sudden and is now constant  It sometimes radiates into his scalp in towards his left eye  He denies headache  He rates the intensity is moderate  He describes it as a constant sharp painful sensation especially with certain neck movements  He denies radiation into his arms shoulders head or back  He denies any new symptoms of numbness, weakness  He denies any visual disturbances that are new furthermore  His wife reports his activity at home is mainly sitting in his recliner leaned all the way back and watching TV  She states his mood appears stable however he is very forgetful at times  She does not report any other major issues    Interim medical update:  Patient recently was diagnosed a 5th toe fracture  Imaging: I have personally reviewed imaging with results as follows:       Review of Systems   Constitutional: Negative  HENT: Negative  Eyes: Negative  Respiratory: Negative      Cardiovascular: Negative  Gastrointestinal: Negative  Endocrine: Negative  Genitourinary: Negative  Musculoskeletal: Positive for neck pain and neck stiffness  Skin: Negative  Allergic/Immunologic: Negative  Neurological: Negative  Hematological: Negative  Psychiatric/Behavioral: Negative  OBJECTIVE:   /82 (BP Location: Right arm, Patient Position: Sitting, Cuff Size: Standard)   Ht 5' 8" (1 727 m)   Wt 109 kg (240 lb 8 oz)   BMI 36 57 kg/m²       Physical Exam   Constitutional: He appears well-developed and well-nourished  Cardiovascular: Intact distal pulses  Pulmonary/Chest: Effort normal    Abdominal: Soft  Bowel sounds are normal  He exhibits no distension  There is no tenderness  Musculoskeletal:   Cervical Exam:  Range of motion limited in rotation and lateral bending bilaterally  Palpation of musculature on the left side of the neck provocative of pain  Tender points identified  Negative Spurling's maneuver   Skin: Skin is warm  Psychiatric: He has a normal mood and affect  Current Outpatient Prescriptions:     amLODIPine (NORVASC) 5 mg tablet, Take 1 tablet (5 mg total) by mouth every 12 (twelve) hours, Disp: 180 tablet, Rfl: 1    aspirin (ECOTRIN LOW STRENGTH) 81 mg EC tablet, Take 81 mg by mouth daily Resume on 8/14, Disp: , Rfl:     atorvastatin (LIPITOR) 80 mg tablet, Take 1 tablet (80 mg total) by mouth daily with dinner, Disp: 90 tablet, Rfl: 1    Blood Glucose Monitoring Suppl (ONE TOUCH ULTRA MINI) w/Device KIT, by Does not apply route 3 (three) times a day, Disp: 1 each, Rfl: 0    Blood Pressure Monitoring (BLOOD PRESSURE CUFF) MISC, Use to check blood pressure before taking blood pressure medication and 1 hour after and follow instructions provided in discharge instructions based on the readings  , Disp: 1 each, Rfl: 0    carvedilol (COREG) 6 25 mg tablet, Take 1 tablet (6 25 mg total) by mouth 2 (two) times a day with meals, Disp: 180 tablet, Rfl: 1   cholecalciferol 96549 units TABS, Take 1 tablet (50,000 Units total) by mouth once a week, Disp: 5 tablet, Rfl: 5    [employer] GLUCOSE 4-6 GM-MG, CHEW 3 TABLETS BY MOUTH DAILY AS NEEDED, Disp: , Rfl: 0    cyanocobalamin 1000 MCG tablet, Take 1 tablet (1,000 mcg total) by mouth daily, Disp: 30 tablet, Rfl: 5    diphenoxylate-atropine (LOMOTIL) 2 5-0 025 mg per tablet, Take 1 tablet by mouth 4 (four) times a day as needed for diarrhea, Disp: , Rfl:     escitalopram (LEXAPRO) 5 mg tablet, Take 1 tablet (5 mg total) by mouth daily, Disp: 30 tablet, Rfl: 2    famotidine (PEPCID) 20 mg tablet, Take 20 mg by mouth daily Resume on 8/14, Disp: , Rfl:     gabapentin (NEURONTIN) 250 mg/5 mL solution, Take 12 mL (600 mg total) by mouth daily at bedtime, Disp: 470 mL, Rfl: 2    glucose 4 g chewable tablet, Chew 3 tablets (12 g total) as needed for low blood sugar, Disp: 50 tablet, Rfl: 0    glucose blood (ONE TOUCH ULTRA TEST) test strip, 1 each by Other route 3 (three) times a day Use as instructed, Disp: 270 each, Rfl: 1    Incontinence Supplies (MALE URINAL) MISC, by Does not apply route daily, Disp: 6 each, Rfl: 3    insulin aspart (NovoLOG) 100 units/mL injection, Inject 4-6 Units under the skin 3 (three) times a day before meals As directed, Disp: 16 mL, Rfl: 1    insulin glargine (LANTUS) 100 units/mL subcutaneous injection, Inject 25 Units under the skin daily, Disp: 10 mL, Rfl: 0    Insulin Syringe-Needle U-100 (B-D INS SYR ULTRAFINE  3CC/30G) 30G X 1/2" 0 3 ML MISC, by Does not apply route 4 (four) times a day, Disp: 360 each, Rfl: 1    Insulin Syringe-Needle U-100 (B-D INS SYRINGE 0 5CC/30GX1/2") 30G X 1/2" 0 5 ML MISC, Inject under the skin 4 (four) times a day, Disp: 360 each, Rfl: 1    lactulose 20 g/30 mL, Take 30 mL (20 g total) by mouth daily as needed (constipation), Disp: 473 mL, Rfl: 0    LUCENTIS 0 3 MG/0 05ML, , Disp: , Rfl:     neomycin-bacitracin-polymyxin b (NEOSPORIN) ointment, Apply 1 application topically 2 (two) times a day Apply twice per day to shin wound until fully healed  If not fully healed in 5 days contact your family doctor  Next application is the evening of 8/13, Disp: , Rfl:     ONETOUCH DELICA LANCETS 34R MISC, by Does not apply route 3 (three) times a day, Disp: 270 each, Rfl: 1    diclofenac sodium (VOLTAREN) 1 %, Apply 2 g topically 3 (three) times a day, Disp: 100 g, Rfl: 1    ondansetron (ZOFRAN-ODT) 4 mg disintegrating tablet, Take 1 tablet (4 mg total) by mouth every 6 (six) hours as needed for nausea or vomiting (Patient not taking: Reported on 1/17/2019 ), Disp: 20 tablet, Rfl: 0    Past Surgical History:   Procedure Laterality Date    ABDOMINAL SURGERY      CHOLECYSTECTOMY      Percutaneous    OTHER SURGICAL HISTORY      "stimulator to control bowel movements"    NM ESOPHAGOGASTRODUODENOSCOPY TRANSORAL DIAGNOSTIC N/A 9/27/2016    Procedure: ESOPHAGOGASTRODUODENOSCOPY (EGD); Surgeon: Daja García MD;  [facility];   Service: Gastroenterology    NM LAP,CHOLECYSTECTOMY N/A 2/29/2016    Procedure: LAPAROSCOPIC CHOLECYSTECTOMY ;  Surgeon: Gita Patterson DO;  [facility];  Service: General    ROTATOR CUFF REPAIR Right     TOE AMPUTATION Right 10/28/2016    Procedure: 3RD TOE AMPUTATION ;  Surgeon: Nicolle Judge DPM;  [facility];  Service:        Patient Active Problem List    Diagnosis Date Noted    TIA (transient ischemic attack) 10/28/2018    Stroke-like symptoms, with right-sided weakness 10/28/2018    Hypoglycemia, transient episode 10/28/2018    History of stroke 10/04/2018    Abnormal EEG 09/24/2018    Other constipation 08/17/2018    GERD (gastroesophageal reflux disease) 08/13/2018    Diabetic macular edema ([facility] [address] ) 08/09/2018    Cognitive impairment 08/03/2018    Elevated alkaline phosphatase level 08/03/2018    CKD (chronic kidney disease) 08/03/2018    Cerebrovascular accident (CVA) due to thrombosis of left middle cerebral artery ([place] [address] ) 07/29/2018    Benign hypertension with CKD (chronic kidney disease) stage III ([place] [address] ) 10/25/2016    Cirrhosis ([place] [address] ) 08/17/2016    Type 2 diabetes mellitus with hyperglycemia, with long-term current use of insulin ([place] [address] ) 02/26/2016    Mixed hyperlipidemia 02/26/2016    Diabetic polyneuropathy associated with type 2 diabetes mellitus ([place] [address] ) 01/26/2016

## 2019-01-31 ENCOUNTER — APPOINTMENT (OUTPATIENT)
Dept: SPEECH THERAPY | Facility: CLINIC | Age: 59
End: 2019-01-31
Payer: COMMERCIAL

## 2019-01-31 ENCOUNTER — OFFICE VISIT (OUTPATIENT)
Dept: PHYSICAL THERAPY | Facility: CLINIC | Age: 59
End: 2019-01-31
Payer: COMMERCIAL

## 2019-01-31 ENCOUNTER — OFFICE VISIT (OUTPATIENT)
Dept: SPEECH THERAPY | Facility: CLINIC | Age: 59
End: 2019-01-31
Payer: COMMERCIAL

## 2019-01-31 DIAGNOSIS — I63.9 CEREBROVASCULAR ACCIDENT (CVA), UNSPECIFIED MECHANISM (HCC): Primary | ICD-10-CM

## 2019-01-31 DIAGNOSIS — R48.8 OTHER SYMBOLIC DYSFUNCTIONS: Primary | ICD-10-CM

## 2019-01-31 DIAGNOSIS — Z79.4 TYPE 2 DIABETES MELLITUS WITH HYPOGLYCEMIA WITHOUT COMA, WITH LONG-TERM CURRENT USE OF INSULIN (HCC): ICD-10-CM

## 2019-01-31 DIAGNOSIS — E11.649 TYPE 2 DIABETES MELLITUS WITH HYPOGLYCEMIA WITHOUT COMA, WITH LONG-TERM CURRENT USE OF INSULIN (HCC): ICD-10-CM

## 2019-01-31 PROCEDURE — 97140 MANUAL THERAPY 1/> REGIONS: CPT | Performed by: PHYSICAL THERAPIST

## 2019-01-31 PROCEDURE — 92507 TX SP LANG VOICE COMM INDIV: CPT

## 2019-01-31 PROCEDURE — 97110 THERAPEUTIC EXERCISES: CPT | Performed by: PHYSICAL THERAPIST

## 2019-01-31 NOTE — PROGRESS NOTES
Daily Speech Treatment Note    Today's date: 2019  Patients name: Efe Haile  : 1960  MRN: [id number]  Safety measures: N/A  Referring provider: Mateo Whatley MD    Primary Diagnosis/Billing code: H71 2      Visit Tracking:  -Referring provider: Epic  -Billing guidelines: AMA  -Visit #   -CBC  no secondary  (no authorization required)  -RE due 2019  Pain: 8/10, patient reports that he has two broken toes  Subjective/Behavioral:  -Patient reports that he is well on this date  Objective/Assessment:  -Patient did not have HEP from previous session  Short-term goals:  Goal 1: Patient will be educated on the use of internal and external memory aids and compensatory strategies with 80% accuracy to facilitate increased recall of routine, personal information, and recent events, to be achieved in 4-6 weeks  --See data under goal 6       Goal 2: Patient will demonstrate functional use of external memory across 3 sessions with 80% accuracy to facilitate carryover of strategies in functional living environment, to be achieved in 4-6 weeks  --Did not target in this session       Goal 3: Patient will complete auditory immediate and short term memory tasks to 80% accuracy to facilitate increased ability to retell narratives and recall information within functional living environment, to be achieved in 4-6 weeks  --Did not target in this session       Goal 4: Patient will complete thought organization tasks (e g , sequencing, deduction puzzles, etc ) with 80% accuracy to facilitate increased executive functioning skills, to be achieved in 4-6 weeks  --Did not target in this session       Goal 5: Patient will complete reading comprehension tasks (e g , answering questions, following complex written directions, etc ) to 80% accuracy to facilitate carryover of comprehension of functional reading materials, to be achieved in 4-6 weeks      --Patient was presented with two step written directions about shapes and asked to complete  Patient demonstrated difficulty with this task  He was noted to have difficulty reading the names of shapes such as triangle and rectangle  He also demonstrated difficulty following one of the two directions in two step directions  Patient was able to complete the task with approximately 30% acc  He verbal prompts to increase understanding of names of shapes       Goal 6: Patient will complete word generation tasks (e g , analogies, category matrices, etc ) with 80% accuracy using word finding strategies to facilitate improved word retrieval skills, to be achieved in 4-6 weeks  --Patient was verbally presented with a category and a letter, asked to generate an item which fit both criteria  Task completed in 18/32 opps, increased to 31/32 opps given verbal semantic prompts  Attempted to re-educate patient on internal memory strategy (visualization) to remember categories  However, patient demonstrated difficulty with utilization of visualization strategy       Goal 7:Patient will name up to 5 features to describe a person/place/thing with 80% accuracy to facilitate improved utilization of circumlocution strategy, to be achieved in 4-6 weeks    --Did not target in this session       Goal 8: Patient will perform oral motor and diadochokinetic speech rate exercises with > 90% accuracy to facilitate increased speech intelligibility, to be achieved in 4-6 weeks  --Did not target in this session  Plan:  -Continue with current plan of care

## 2019-01-31 NOTE — TELEPHONE ENCOUNTER
Pt is taking novolog plain as per records, he should be taking novolog and not Novolog 70/30 , would prefer Novolog pt prevent fluctuation in blood sugars,   Please verify with pt again, we can send RX to  pharmacy for novolog 8 units with meals +scale ,thanks   Discontinue Novolog 70/30 thanks

## 2019-01-31 NOTE — TELEPHONE ENCOUNTER
Per pt's wife, PCP ordered Novolog 70/30 and that is what patient is using  I let pt know you would rather regular Novolog    Do you want flexpen or vial? Same dosing with scale?  Let me know and I will order

## 2019-01-31 NOTE — PROGRESS NOTES
Daily Note     Today's date: 2019  Patient name: Deisy Sanchez  : 1960  MRN: [id number]  Referring provider: Verenice Gillespie MD  Dx:   Encounter Diagnosis     ICD-10-CM    1  Cerebrovascular accident (CVA), unspecified mechanism ([facility] [address] ) [address] 9                 Subjective: Patient reports L foot pain 8/10 due to fracturing 2 metatarsals  Pt wearing Darco shoe, reports he needs to wear 4-6 weeks  Pain with ambulation  Also reports L sided neck pain that started 2 days ago  Objective: See treatment diary below  Solo Step  - Marching with alternating hand taps: 1 cycles  - Marching with same side hand taps (FWD/BWD): 4 cycles  - Sit to Stands: PT block left foot, 20 reps  - 9" Hurdles (FWD/LAT/BWD): 3 cycles each,   (NOT PERFORMED TODAY DUE TO RECENT FOOT FX AND PAIN WITH AMBULATION)    NuStep x 10 min (L1)    Cervical AROM assessment:  Flexion 55 degrees  Extension 40 degrees  L rotation 15 degrees (pain)  L sidebend 25 degrees (pain)  R rotation 39 degrees  R sidebend 25 degrees (pain)    Palpation: severe hypertonicity L SCM and scalenes  Gentle STM and some TRP to above muscles , MH prior x 6 min  New STG (3-4 wks)  1  Pt will display increase in cervical lateral flexion and rotation ROM by 10-15 degrees  2  Pt will display normal soft tissue density t/o cervical spine  3  Pt will report decrease in neck pain by 2 levels on numeric rating pain scale  Assessment:   Pt tolerated treatment session fair today  Pt fell 3 days ago when getting up to use the BR in middle of the night, resulted in 5th toe fx  Pt also reports sudden onset of L sided neck pain 2 days ago; saw Dr Vitaliy Bush who prescribed voltaren gel  Pt presents with significant impairments in lateral flexion and rotation ROM (as above)  Used MH and performed manual therapy today to address muscle hypertonicity  Pt had poor tolerance to TRP, performed more gentle STM since muscles are still in spasm   Advised pt to use MH at home and perform gentle cervical AROM  Educated on importance of staying mobile at home instead of sitting in recliner all day  Advised to walk in his home t/o the day  Did not perform any balance or ambulatory activities today due to high level of foot pain  Able to tolerate Nustep  Pt will continue to benefit from skilled outpatient PT services to improve his balance and mobility to maximize his function  Plan: Continue per plan of care

## 2019-02-03 ENCOUNTER — HOSPITAL ENCOUNTER (EMERGENCY)
Facility: HOSPITAL | Age: 59
Discharge: HOME | End: 2019-02-03
Attending: EMERGENCY MEDICINE
Payer: COMMERCIAL

## 2019-02-03 VITALS
OXYGEN SATURATION: 96 % | DIASTOLIC BLOOD PRESSURE: 76 MMHG | HEART RATE: 85 BPM | SYSTOLIC BLOOD PRESSURE: 158 MMHG | WEIGHT: 238.54 LBS | RESPIRATION RATE: 14 BRPM | TEMPERATURE: 97.7 F | BODY MASS INDEX: 36.27 KG/M2

## 2019-02-03 DIAGNOSIS — M79.18 MYOFASCIAL MUSCLE PAIN: Primary | ICD-10-CM

## 2019-02-03 DIAGNOSIS — S92.355A NONDISPLACED FRACTURE OF FIFTH LEFT METATARSAL BONE: ICD-10-CM

## 2019-02-03 PROCEDURE — 99283 EMERGENCY DEPT VISIT LOW MDM: CPT

## 2019-02-03 RX ORDER — ACETAMINOPHEN 325 MG/1
650 TABLET ORAL ONCE
Status: COMPLETED | OUTPATIENT
Start: 2019-02-03 | End: 2019-02-03

## 2019-02-03 RX ADMIN — ACETAMINOPHEN 650 MG: 325 TABLET, FILM COATED ORAL at 15:36

## 2019-02-03 NOTE — ED PROVIDER NOTES
History  Chief Complaint   Patient presents with    Headache     Patient c/o occipital head pain as well pain on b/l sides of neck  Patient's wife states, 'I already took him to the neurologist and we are waiting for results for xray "      71-year-old male presents emergency department with complaint of 4 day history of neck pain  Was recently evaluated by PM&R who performed cervical spine x-ray which revealed degenerative changes at C3-C4 with no acute osseous abnormality  Patient was prescribed diclofenac gel and instructed to take extra-strength Tylenol and apply heating pads as needed for pain  Denies any numbness or tingling in fingers or arms  Patient also complains of pain in left 5th toe which has been diagnosed with nondisplaced fracture around 1 week ago  States pain is unchanged from before but has not improved  Was recently seen by Podiatry who provided patient with a boot which he is to wear for 4-6 weeks  Headache   Associated symptoms: myalgias (Neck) and neck pain    Associated symptoms: no back pain, no cough, no dizziness, no fatigue, no fever, no neck stiffness, no numbness and no weakness        Prior to Admission Medications   Prescriptions Last Dose Informant Patient Reported? Taking? Blood Glucose Monitoring Suppl (ONE TOUCH ULTRA MINI) w/Device KIT  Spouse/Significant Other No Yes   Sig: by Does not apply route 3 (three) times a day   Blood Pressure Monitoring (BLOOD PRESSURE CUFF) MISC  Spouse/Significant Other No Yes   Sig: Use to check blood pressure before taking blood pressure medication and 1 hour after and follow instructions provided in discharge instructions based on the readings     [employer] GLUCOSE 4-6 GM-MG  Spouse/Significant Other Yes Yes   Sig: CHEW 3 TABLETS BY MOUTH DAILY AS NEEDED   Incontinence Supplies (MALE URINAL) MISC  Spouse/Significant Other No Yes   Sig: by Does not apply route daily   Insulin Syringe-Needle U-100 (B-D INS SYR ULTRAFINE  3CC/30G) 30G X 1/2" 0 3 ML MISC  Spouse/Significant Other No Yes   Sig: by Does not apply route 4 (four) times a day   Insulin Syringe-Needle U-100 (B-D INS SYRINGE 0 5CC/30GX1/2") 30G X 1/2" 0 5 ML MISC   No Yes   Sig: Inject under the skin 4 (four) times a day   LUCENTIS 0 3 MG/0 05ML  Spouse/Significant Other Yes Yes   ONETOUCH DELICA LANCETS 24J MISC  Spouse/Significant Other No Yes   Sig: by Does not apply route 3 (three) times a day   amLODIPine (NORVASC) 5 mg tablet  Spouse/Significant Other No Yes   Sig: Take 1 tablet (5 mg total) by mouth every 12 (twelve) hours   aspirin (ECOTRIN LOW STRENGTH) 81 mg EC tablet  Spouse/Significant Other Yes Yes   Sig: Take 81 mg by mouth daily Resume on    atorvastatin (LIPITOR) 80 mg tablet  Spouse/Significant Other No Yes   Sig: Take 1 tablet (80 mg total) by mouth daily with dinner   carvedilol (COREG) 6 25 mg tablet  Spouse/Significant Other No Yes   Sig: Take 1 tablet (6 25 mg total) by mouth 2 (two) times a day with meals   cholecalciferol 22494 units TABS  Spouse/Significant Other No Yes   Sig: Take 1 tablet (50,000 Units total) by mouth once a week   cyanocobalamin 1000 MCG tablet  Spouse/Significant Other No Yes   Sig: Take 1 tablet (1,000 mcg total) by mouth daily   diclofenac sodium (VOLTAREN) 1 %   No Yes   Sig: Apply 2 g topically 3 (three) times a day   escitalopram (LEXAPRO) 5 mg tablet  Spouse/Significant Other No Yes   Sig: Take 1 tablet (5 mg total) by mouth daily   famotidine (PEPCID) 20 mg tablet  Spouse/Significant Other Yes Yes   Sig: Take 20 mg by mouth daily Resume on    gabapentin (NEURONTIN) 250 mg/5 mL solution  Spouse/Significant Other No Yes   Sig: Take 12 mL (600 mg total) by mouth daily at bedtime   glucose 4 g chewable tablet  Spouse/Significant Other No Yes   Sig: Chew 3 tablets (12 g total) as needed for low blood sugar   glucose blood (ONE TOUCH ULTRA TEST) test strip   No Yes   Si each by Other route 3 (three) times a day Use as instructed insulin aspart (NovoLOG) 100 units/mL injection   No Yes   Sig: Inject 8 Units under the skin 3 (three) times a day before meals plus scale  Scale - 150-200 -2 units, 201-250-4 units, 251-300 -6 units, 301-350-8 units, > 350- 10 units   Patient taking differently: Inject 12 Units under the skin 3 (three) times a day before meals plus scale  Scale - 150-200 -2 units, 201-250-4 units, 251-300 -6 units, 301-350-8 units, > 350- 10 units    insulin glargine (LANTUS) 100 units/mL subcutaneous injection  Spouse/Significant Other No Yes   Sig: Inject 25 Units under the skin daily   Patient taking differently: Inject 30 Units under the skin daily     lactulose 20 g/30 mL  Spouse/Significant Other No Yes   Sig: Take 30 mL (20 g total) by mouth daily as needed (constipation)   neomycin-bacitracin-polymyxin b (NEOSPORIN) ointment  Spouse/Significant Other Yes Yes   Sig: Apply 1 application topically 2 (two) times a day Apply twice per day to shin wound until fully healed  If not fully healed in 5 days contact your family doctor   Next application is the evening of 8/13   ondansetron (ZOFRAN-ODT) 4 mg disintegrating tablet Not Taking at Unknown time Spouse/Significant Other No No   Sig: Take 1 tablet (4 mg total) by mouth every 6 (six) hours as needed for nausea or vomiting   Patient not taking: Reported on 2/3/2019       Facility-Administered Medications: None       Past Medical History:   Diagnosis Date    Diabetes mellitus ([facility] )     Hypercholesteremia     Hyperlipidemia     Hypertension     Neuropathy     Obesity     Osteomyelitis ([facility] )     last assessed 11/4/16    PVC's (premature ventricular contractions)     sees cardiology Dr Charity camargo    Stroke [facility])     last weeof July 2018 [address] [facility]       Past Surgical History:   Procedure Laterality Date    ABDOMINAL SURGERY      CHOLECYSTECTOMY      Percutaneous    OTHER SURGICAL HISTORY      "stimulator to control bowel movements"    OR ESOPHAGOGASTRODUODENOSCOPY TRANSORAL DIAGNOSTIC N/A 9/27/2016    Procedure: ESOPHAGOGASTRODUODENOSCOPY (EGD); Surgeon: Sean Chamorro MD;  [facility]; Service: Gastroenterology    NC LAP,CHOLECYSTECTOMY N/A 2/29/2016    Procedure: LAPAROSCOPIC CHOLECYSTECTOMY ;  Surgeon: Heather Bansal DO;  [facility];  Service: General    ROTATOR CUFF REPAIR Right     TOE AMPUTATION Right 10/28/2016    Procedure: 3RD TOE AMPUTATION ;  Surgeon: Darrell Barraza DPM;  [facility];  Service:        Family History   Problem Relation Age of Onset    Leukemia Mother     Liver disease Mother     Lung cancer Mother         heavy smoker - 3 ppd    Heart disease Father     Liver disease Father     Multiple myeloma Sister     Breast cancer Sister     Urolithiasis Family     Alcohol abuse Neg Hx     Depression Neg Hx     Drug abuse Neg Hx     Substance Abuse Neg Hx     Mental illness Neg Hx      I have reviewed and agree with the history as documented  Social History   Substance Use Topics    Smoking status: Never Smoker    Smokeless tobacco: Never Used    Alcohol use No        Review of Systems   Constitutional: Negative for activity change, chills, diaphoresis, fatigue and fever  Eyes: Negative for visual disturbance  Respiratory: Negative for cough, choking, chest tightness, shortness of breath, wheezing and stridor  Cardiovascular: Negative for chest pain and palpitations  Left sided lower rib pain   Musculoskeletal: Positive for myalgias (Neck) and neck pain  Negative for arthralgias, back pain, gait problem, joint swelling and neck stiffness  Neurological: Negative for dizziness, tremors, syncope, facial asymmetry, speech difficulty, weakness, light-headedness, numbness and headaches  All other systems reviewed and are negative  Physical Exam  Physical Exam   Constitutional: He is oriented to person, place, and time   He appears well-developed and well-nourished  No distress  HENT:   Head: Normocephalic and atraumatic  Eyes: Pupils are equal, round, and reactive to light  Conjunctivae and EOM are normal    Neck: Normal range of motion  Neck supple  Cardiovascular: Normal rate, regular rhythm and normal heart sounds  Exam reveals no gallop and no friction rub  No murmur heard  Musculoskeletal: Normal range of motion  He exhibits tenderness (Palpation over paravertebral muscles in cervical area  Some mild tenderness to palpation over left lower ribs  )  Neurological: He is alert and oriented to person, place, and time  Skin: Skin is warm and dry  Capillary refill takes less than 2 seconds  No rash noted  He is not diaphoretic  No erythema  No pallor  Vital Signs  ED Triage Vitals   Temperature Pulse Respirations Blood Pressure SpO2   02/03/19 1508 02/03/19 1508 02/03/19 1508 02/03/19 1509 02/03/19 1508   97 7 °F (36 5 °C) 85 20 (!) 201/86 96 %      Temp Source Heart Rate Source Patient Position - Orthostatic VS BP Location FiO2 (%)   02/03/19 1508 02/03/19 1508 02/03/19 1508 02/03/19 1508 --   Oral Monitor Lying Right arm       Pain Score       02/03/19 1536       8           Vitals:    02/03/19 1508 02/03/19 1509 02/03/19 1513   BP:  (!) 201/86 158/76   Pulse: 85     Patient Position - Orthostatic VS: Lying Lying        Visual Acuity      ED Medications  Medications   acetaminophen (TYLENOL) tablet 650 mg (650 mg Oral Given 2/3/19 1536)       Diagnostic Studies  Results Reviewed     None                 No orders to display              Procedures  Procedures       Phone Contacts  ED Phone Contact    ED Course      Patient seen and assessed bedside  Case discussed with attending physician  Explain the patient that he continue following recommendations of PM&R as well as Podiatry  Given single dose of 650 mg Tylenol for current pain                            MDM  Number of Diagnoses or Management Options  Myofascial muscle pain: established and worsening  Nondisplaced fracture of fifth left metatarsal bone: established and improving  Diagnosis management comments: 49-year-old male presents to the emergency department complaining of persistent neck pain x4 days as well as continued pain in previously fractured left 5th toe  Differential diagnosis for neck pain includes but is not limited to myofascial pain, cervical radiculopathy, spondylosis  Patient recently had x-ray at PM&R which revealed degenerative changes at C3-C4 but no acute osseous process  Was prescribed diclofenac gel by them and recommended to take extra-strength Tylenol as needed as well as apply heating pads as needed  Patient also recently seen by Podiatry who gave patient boot for left foot and recommended 4-6 weeks of wearing  Also recommended over-the-counter medication for pain relief  Reiterated these treatment modalities to patient and encouraged he follow these recommendations  Explained that left toe pain will take time to resolve as bone heals  Provided 650 mg Tylenol to patient while in the ED for pain relief  Recommended gentle stretching exercises for neck avoiding keeping neck in same position for long stretches of time  Recommended increased physical activity throughout the day  Explained that mild left lower rib pain most likely also musculoskeletal in origin and that Tylenol should help to relieve this pain  Encouraged to follow up with primary care provider regarding increased anxiety  Encouraged to return if symptoms worsen         Amount and/or Complexity of Data Reviewed  Discuss the patient with other providers: yes    Risk of Complications, Morbidity, and/or Mortality  Presenting problems: minimal  Diagnostic procedures: minimal  Management options: minimal    Patient Progress  Patient progress: stable      Disposition  Final diagnoses:   Myofascial muscle pain   Nondisplaced fracture of fifth left metatarsal bone     Time reflects when diagnosis was documented in both MDM as applicable and the Disposition within this note     Time User Action Codes Description Comment    2/3/2019  3:46 PM Jhoana Slates Add [G01 48] Myofascial muscle pain     2/3/2019  3:47 PM [place] Slates Add [Y62 597N] Nondisplaced fracture of fifth left metatarsal bone       ED Disposition     ED Disposition Condition Date/Time Comment    Discharge  Sun Feb 3, 2019  3:47 PM Melburn Genin discharge to home/self care  Condition at discharge: Good        Follow-up Information     Follow up With Specialties Details Why [facility]en[facility] [address] [place] [address] Internal Medicine   [address] [place] [address],[address]  [place] [address]  [phone]            Discharge Medication List as of 2/3/2019  3:48 PM      CONTINUE these medications which have NOT CHANGED    Details   amLODIPine (NORVASC) 5 mg tablet Take 1 tablet (5 mg total) by mouth every 12 (twelve) hours, Starting Mon 10/15/2018, Normal      aspirin (ECOTRIN LOW STRENGTH) 81 mg EC tablet Take 81 mg by mouth daily Resume on 8/14, Historical Med      atorvastatin (LIPITOR) 80 mg tablet Take 1 tablet (80 mg total) by mouth daily with dinner, Starting Thu 12/20/2018, Normal      Blood Glucose Monitoring Suppl (ONE TOUCH ULTRA MINI) w/Device KIT by Does not apply route 3 (three) times a day, Starting Tue 11/27/2018, Normal      Blood Pressure Monitoring (BLOOD PRESSURE CUFF) MISC Use to check blood pressure before taking blood pressure medication and 1 hour after and follow instructions provided in discharge instructions based on the readings  , Print      carvedilol (COREG) 6 25 mg tablet Take 1 tablet (6 25 mg total) by mouth 2 (two) times a day with meals, Starting Mon 10/15/2018, Normal      cholecalciferol 45924 units TABS Take 1 tablet (50,000 Units total) by mouth once a week, Starting Mon 9/24/2018, Normal      [employer] GLUCOSE 4-6 GM-MG CHEW 3 TABLETS BY MOUTH DAILY AS NEEDED, Historical Med      cyanocobalamin 1000 MCG tablet Take 1 tablet (1,000 mcg total) by mouth daily, Starting Mon 9/24/2018, Normal      diclofenac sodium (VOLTAREN) 1 % Apply 2 g topically 3 (three) times a day, Starting Wed 1/30/2019, Normal      escitalopram (LEXAPRO) 5 mg tablet Take 1 tablet (5 mg total) by mouth daily, Starting Fri 10/26/2018, Normal      famotidine (PEPCID) 20 mg tablet Take 20 mg by mouth daily Resume on 8/14, Historical Med      gabapentin (NEURONTIN) 250 mg/5 mL solution Take 12 mL (600 mg total) by mouth daily at bedtime, Starting Mon 12/17/2018, Normal      glucose 4 g chewable tablet Chew 3 tablets (12 g total) as needed for low blood sugar, Starting Wed 8/15/2018, Normal      glucose blood (ONE TOUCH ULTRA TEST) test strip 1 each by Other route 3 (three) times a day Use as instructed, Starting Tue 1/29/2019, Normal      Incontinence Supplies (MALE URINAL) MISC by Does not apply route daily, Starting Mon 9/24/2018, Print      insulin aspart (NovoLOG) 100 units/mL injection Inject 8 Units under the skin 3 (three) times a day before meals plus scale  Scale - 150-200 -2 units, 201-250-4 units, 251-300 -6 units, 301-350-8 units, > 350- 10 units, Starting Thu 1/31/2019, Print      insulin glargine (LANTUS) 100 units/mL subcutaneous injection Inject 25 Units under the skin daily, Starting Tue 10/30/2018, Print      Insulin Syringe-Needle U-100 (B-D INS SYR ULTRAFINE  3CC/30G) 30G X 1/2" 0 3 ML MISC by Does not apply route 4 (four) times a day, Starting Fri 10/26/2018, Normal      Insulin Syringe-Needle U-100 (B-D INS SYRINGE 0 5CC/30GX1/2") 30G X 1/2" 0 5 ML MISC Inject under the skin 4 (four) times a day, Starting Mon 1/28/2019, Normal      lactulose 20 g/30 mL Take 30 mL (20 g total) by mouth daily as needed (constipation), Starting Fri 8/17/2018, Normal      LUCENTIS 0 3 MG/0 05ML Starting Tue 9/11/2018, Historical Med      neomycin-bacitracin-polymyxin b (NEOSPORIN) ointment Apply 1 application topically 2 (two) times a day Apply twice per day to shin wound until fully healed  If not fully healed in 5 days contact your family doctor  Next application is the evening of 8/13, Historical Med      ONETOUCH DELICA LANCETS 24K MISC by Does not apply route 3 (three) times a day, Starting Tue 11/27/2018, Normal      ondansetron (ZOFRAN-ODT) 4 mg disintegrating tablet Take 1 tablet (4 mg total) by mouth every 6 (six) hours as needed for nausea or vomiting, Starting Sat 10/6/2018, Normal           No discharge procedures on file      ED Provider  Electronically Signed by           Armando Valente PA-C  02/03/19 9438

## 2019-02-03 NOTE — DISCHARGE INSTRUCTIONS
ßÓÑ ÚÙÇã ÇáÞÏã áÏì ÇáÈÇáÛíä   ãÇ íÌÈ Úáíß ãÚÑÝÊå:   ßÓÑ ÇáÞÏã åæ ÍÇáÉ ãÑÖíÉ íÊÚÑÖ ÝíåÇ ÇáÔÎÕ áßÓÑ Ýí æÇÍÏÉ Ãæ ÃßËÑ ãä ÚÙÇã ÇáÞÏã  æÊäÊÌ ßÓæÑ ÇáÞÏã ÈÔßá ÔÇÆÚ Úä ÇáÕÏãÇÊ Ãæ ÇáÓÞæØ Ãæ ÅÕÇÈÇÊ ÇáÅÌåÇÏ ÇáãÊßÑÑ  ÅÑÔÇÏÇÊ AYWCKS ÎÇÑÌ ÇáãÓÊÔÝì:   ÇáÃÏæíÉ:   · ÇáãÖÇÏÇÊ ÇáÍíæíÉ:  íÊã ÅÚØÇÁ åÐÇ ÇáÏæÇÁ ááãÓÇÚÏÉ Úáì ÇáÚáÇÌ ãä ÚÏæì äÇÊÌÉ Úä ÇáÈßÊíÑíÇ Ãæ ÇáæÞÇíÉ ãäåÇ  · ÇáÃÏæíÉ ÇááÇÓÊíÑæíÏíÉ ÇáãÖÇÏÉ ááÇáÊåÇÈ (NSAID):  ÊÓÇÚÏ åÐå ÇáÃÏæíÉ Úáì ÊÞáíá ÇáÊæÑã æÇáÃáã  ÊÊæÝÑ ÇáÃÏæíÉ ÇááÇÓÊíÑæíÏíÉ ÇáãÖÇÏÉ ááÇáÊåÇÈ Ïæä ÃãÑ ãä ØÈíÈ  ÇÓÊÝÓÑ Úä ÇáÏæÇÁ ÇáãäÇÓÈ áß  ÇÓÊÝÓÑ Úä ßãíÉ ÇáÏæÇÁ ÇáÊí NAQKIVYR ææÞÊ DSOSPRH  OQFGL ÇáÏæÇÁ æÝÞÇ ááÅÑÔÇÏÇÊ  ÞÏ ÊÊÓÈÈ ÇáÃÏæíÉ ÇááÇÓÊíÑæíÏíÉ ÇáãÖÇÏÉ ááÇáÊåÇÈ (NSAID) Ýí ÍÏæË äÒíÝ ÈÇáãÚÏÉ æãÔßáÇÊ NVEQLV ÅÐÇ áã íÊã ÊäÇæáåÇ ÈÔßá ÕÍíÍ  · ÃÏæíÉ ÊÎÝíÝ ÇáÃáã:  íãßä ÅÚØÇÄß æÕÝÉ ÏæÇÁ áÊÎÝíÝ ÇáÃáã  áÇ ÊäÊÙÑ ÍÊì íÕÈÍ ÇáÃáã ÍÇÏðÇ áÊäÇæá åÐÇ ÇáÏæÇÁ  · ÊäÇæá ÇáÏæÇÁ æÝÞðÇ ááÅÑÔÇÏÇÊ  ÇÊÕá ÈãÞÏã ÇáÑÚÇíÉ ÇáÕÍíÉ áÏíß ÅÐÇ ßäÊ ÊÚÊÞÏ Ãä ÇáÏæÇÁ ÇáÐí UDFVSZL áÇ íÝíÏß Ãæ ÅÐÇ ßäÊ ÊÚÇäí ãä ÂËÇÑ ÌÇäÈíÉ  ÃÎÈÑå Ãæ ÃÎÈÑåÇ ÅÐÇ ßÇäÊ áÏíß ÍÓÇÓíÉ ãä Ãí ÏæÇÁ  ÇÍÊÝÙ ÈÞÇÆãÉ ÇáÃÏæíÉ æÇáÝíÊÇãíäÇÊ AHKURIXZ ÇáÊí ÊÊäÇæáåÇ  ÃÏÑöÌ ÝíåÇ ÌÑÚÇÊ ÇáÃÏæíÉ æãæÇÚíÏåÇ æÓÈÈ ÊäÇæáåÇ  ÃÍÖöÑ ãÚß åÐå ÇáÞÇÆãÉ Ãæ ÚÈæÇÊ ÇáÃÞÑÇÕ ÚäÏ ÒíÇÑÇÊ ÇáãÊÇÈÚÉ  Avera Marts ÞÇÆãÉ ÇáÃÏæíÉ ãÚß ÊÍÓÈðÇ áÍÇáÇÊ ÇáØæÇÑÆ  íÌÈ ÇáãÊÇÈÚÉ ãÚ ÇÎÊÕÇÕí ÇáÚÙÇã Ãæ ãÞÏã ÇáÑÚÇíÉ ÇáÕÍíÉ áÏíß æÝÞðÇ XVXKJWNBX ÇáÊÇáíÉ:  ÞÏ ÊÍÊÇÌ Åáì ÇáÚæÏÉ áÅÒÇáÉ ÇáÌÈíÑÉ Ãæ ÖãÇÏÉ ÇáÌÈÓ Ãæ æÓÇÆá ÇáÊËÈíÊ ÇáÎÇÑÌíÉ Ãæ ÇáÛÑÒ  æÞÏ ÊÍÊÇÌ ÃíÖðÇ Åáì ÇáÚæÏÉ áÅÌÑÇÁ ÝÍæÕÇÊ ááÊÃßÏ ãä ãÏì ÔÝÇÁ ÞÏãß  íõÑÌì ÊÏæíä ÃÓÆáÊß áÊÊÐßøÑ ØÑÍåÇ ÃËäÇÁ ÒíÇÑÇÊß ááØÈíÈ  ÇáÚäÇíÉ ÈÇáãÔÇÈß:  ÅÐÇ ßÇäÊ áÏíß ãÓÇãíÑ Ýí ÞÏãß¡ KMEUONP Åáì ÊäÙíÝåÇ ÈÔßá íæãí  æíãßä Ãä íÓÇÚÏ ÊäÙíÝ Êáß ÇáãÓÇãíÑ Úáì XSZVPGZS Ïæä ÇáÅÕÇÈÉ ÈÇáÚÏæì  ÇÓÊÝÓÑ Úä ÇáãÒíÏ ãä QXLOLDWOZ Úä ÑÚÇíÉ ÇáãÓÇãíÑ  ÇáÚäÇíÉ ÈÇáÌÑæÍ:  ÇÛÓá ÇáÌÑÍ ÈÇáãÇÁ æÇáÕÇÈæä ÈÚäÇíÉ  Þã ÈÊÌÝíÝ ÇáãäØÞÉ æÖÚ ÖãÇÏÇÊ ÌÏíÏÉ æäÙíÝÉ ÍÓÈ ÇáÅÑÔÇÏÇÊ  Þã ÈÊÛííÑ ÖãÇÏÇÊß ßáãÇ ÇÈÊáÊ Ãæ ÇÊÓÎÊ    ÇáÑÚÇíÉ ÇáÐÇÊíÉ:   · ÇáÑÇÍÉ:  ÞÏ ÊÍÊÇÌ áÅÑÇÍÉ ÞÏãß æÊÌäÈ ÇáÃäÔØÉ ÇáÊí ÊÓÈÈ ÇáÃáã  ÈÇáäÓÈÉ ááßÓæÑ ÇáäÇÌãÉ Úä ÇáÅÌåÇÏ¡ ÓÊÍÊÇÌ Åáì ÊÌäÈ ÇáÃäÔØÉ ÇáÊí ÊÓÈÈÊ Ýí ÍÏæË ÇáßÓÑ Åáì Ãä íÔÝì  ÇÓÊÝÓÑ Úä ÇáãæÚÏ ÇáãäÇÓÈ ááÚæÏÉ Åáì ããÇÑÓÉ ÃäÔØÊß ÇáãÚÊÇÏÉ ãËá ÇáÚãá æÇáÑíÇÖÉ  · ÇáËáÌ:  íÓÇÚÏ ÇáËáÌ Úáì ÊÞáíá ÇáÊæÑã æÇáÃáã  æÞÏ íÓÇÚÏ ÇáËáÌ ÃíÖðÇ Úáì ãäÚ ÍÏæË ÊáÝ Ýí ÇáÃäÓÌÉ  ÇÓÊÎÏã ßíÓ ËáÌ Ãæ ÖÚ ËáÌðÇ ãÌÑæÔðÇ Ýí ßíÓ ãä ÇáÈáÇÓÊíß  Þã ÈÊÛØíÊå ÈãäÔÝÉ æÖÚå Úáì ÞÏãß áãÏÉ ÊÊÑÇæÍ Èíä 15 Åáì 20 ÏÞíÞÉ ßá ÓÇÚÉ æÝÞðÇ ááÅÑÔÇÏÇÊ  · ÇÑÝÚ ÞÏãß:  ÇÑÝÚ ÞÏãß ÚäÏ Ãæ QNQA ãä ãÓÊæì ERSVM ÞÏÑ ÇáÅãßÇä  ÓíÓÇÚÏ Ðáß Úáì ÊÎÝíÝ ÇáÃáã æÇáÊæÑã  ÖÚ ÞÏãß Úáì æÓÇÏÇÊ Ãæ ÈØÇØíä ááÅÈÞÇÁ ÚáíåÇ ãÑÊÝÚÉ ÈÔßá ãÑíÍ  · GOQHIM ÇáØÈíÚí:  ÈãÌÑÏ ÔÝÇÁ ÞÏãß¡ íãßä Ãä íÚáãß ÃÎÕÇÆí ÚáÇÌ ØÈíÚí ÊãÑíäÇÊ ááãÓÇÚÏÉ Ýí ÊÍÓíä DCSAUA DBLVKC¡ Åáì ÌÇäÈ ÊÎÝíÝ ÇáÃáã  ÇáÚäÇíÉ ÈÖãÇÏÉ ÇáÌÈÓ Ãæ ÇáÌÈíÑÉ:   · ÇÝÍÕ ÇáÌáÏ Íæá ÇáÖãÇÏÉ æÇáÌÈíÑÉ ÈÔßá íæãí ÈÍËðÇ Úä Ãí ÇÍãÑÇÑ Ãæ ÃíÉ ãäÇØÞ ãÝÊæÍÉ  · áÇ ÊÍÇæá ÇÓÊÎÏÇã ÇáÃÔíÇÁ RZTAST Ãæ ÇáãÏÈÈÉ áÎÏÔ ÇáÌáÏ ÊÍÊ ÇáÖãÇÏÉ Ãæ ÇáÌÈíÑÉ  · áÇ ÊÞã ÈÅÒÇáÉ ÇáÌÈíÑÉ ÅáÇ ÈÚÏ ãæÇÝÞÉ ãÞÏã ÇáÑÚÇíÉ ÇáÕÍíÉ Ãæ ÌÑÇÍ ÇáÚÙÇã Úáì Ðáß  DYRZCYVEW ÃËäÇÁ ÇÑÊÏÇÁ ÖãÇÏÉ Ãæ ÌÈíÑÉ:  ÊÌäÈ Èáø ÇáÌÈíÑÉ Ãæ ÇáÌÈÓ  ÞÈá SNHTTIFQM¡ Þã ÈÊÛØíÉ ÇáÖãÇÏÉ Ãæ ÇáÌÈíÑÉ ÈßíÓ ãä ÇáÈáÇÓÊíß  ÇÑÈØ ÇáßíÓ Úáì ÌáÏß ÃÚáì ÇáÖãÇÏÉ Ãæ ÇáÌÈíÑÉ áãäÚ ÊÓÑÈ ÇáãíÇå ÅáíåÇ  ÃÎÑÌ ÞÏãß ãä ÇáãíÇå Ýí ÍÇáÉ ÊÓÑÈ ÇáãíÇå Åáì ÇáßíÓ  ÇÓÊÝÓÑ Úä ãæÚÏ RUAATEXDM Ãæ ÃÎÐ ÇáÏÔ  ÇáÃÌåÒÉ ÇáãÓÇÚÏÉ:  íãßä Ãä ÊõÚØì ÍÐÇÁð áå äÚá ÕáÈ áÇÑÊÏÇÆå ÃËäÇÁ ÔÝÇÁ ÇáÞÏã  ßãÇ ÞÏ ÊÍÊÇÌ áÇÓÊÎÏÇã ÇáÚßÇÒíä áãÓÇÚÏÊß Úáì ÇáÓíÑ ÃËäÇÁ ÝÊÑÉ ÔÝÇÁ ÞÏãß  ãä Çáãåã Ãä ÊÓÊÎÏã ÇáÚßÇÒíä ÈÔßá ÕÍíÍ  ÇØáÈ GMRNKE Úáì ãÒíÏ ãä BNSVFSQSH Úä ßíÝíÉ ÇÓÊÎÏÇã ÇáÚßÇÒíä  ÇÊÕá ÈãÞÏã ÇáÑÚÇíÉ ÇáÕÍíÉ áÏíß Ãæ ÈÇÎÊÕÇÕí ÇáÚÙÇã ÅÐÇ:   · ÇáÅÕÇÈÉ ÈÇáÍãì    · ßÇäÊ áÏíß ÊÞÑÍÇÊ ÌÏíÏÉ Íæá ÇáÞÏã Ãæ ÇáÌÈíÑÉ Ãæ ÇáÖãÇÏÉ  · ÊÚÇäí ãä ÕÚæÈÉ ÌÏíÏÉ Ãæ ãÊÝÇÞãÉ Ýí ÊÍÑíß ÇáÞÏã  · áÇÍÙÊ ÑÇÆÍÉ ßÑíåÉ ÊÎÑÌ ãä ÊÍÊ ÇáÌÈíÑÉ  · ÊÚÑÖ ÇáÍÐÇÁ Ãæ ÇáÖãÇÏÉ Ãæ ÇáÌÈíÑÉ ááÊÖÑÑ      · ÅÐÇ ßÇäÊ áÏíß QHEPUXHMN Ãæ ãÎÇæÝ OXGHIX ÈÍÇáÊß ÇáãÑÖíøÉ Ãæ ÈÇáÑÚÇíÉ  ÇØáÈ ÇáÑÚÇíÉ ÝæÑðÇ Ãæ ÇÊÕá ÈÇáÑÞã 911 Ýí ÍÇáÉ:   · ÊÝÇÞã ÇáÃáã Ýí ÞÏãß ÇáãÕÇÈÉ ÍÊì ÈÚÏ ÇáÑÇÍÉ æÊäÇæá ÇáÃÏæíÉ ÇáãÎÝÝÉ ááÃáã  · ÊÚÑÖ ÌáÏ Ãæ ÃÕÇÈÚ ÞÏãß ááÎÏÑ Ãæ ÇáÊæÑã Ãæ ÇáÈÑæÏÉ Ãæ ÃÕÈÍ ÃÈíÖ Ãæ ÃÒÑÞ Çááæä  · ßäÊ ÊÚÇäí ãä ÒíÇÏÉ ÇáÃáã Ãæ ÇáÊæÑã ÚãÇ ßÇä Úáíå ÇáÃãÑ ÞÈá æÖÚ ÇáÌÈíÑÉ  · ßÇä ÌÑÍß íÎÑÌ ãäå ÓÇÆá Ãæ ÞíÍ  · ßÇä ÇáÏã íÊÓÑÈ ãä ÖãÇÏÊß  · ÊÔÚÑ ÈÓÎæäÉ æÖÚÝ æÃáã Ýí ÓÇÞß  ÞÏ ÊÈÏæ ãÊæÑãÉ æÍãÑÇÁ  · ÇáÔÚæÑ ÈÏæÎÉ æÖíÞ Ýí ÇáÊäÝÓ ÈÔßá ãÝÇÌÆ  ÇáãÚÇäÇÉ ãä Ãáã Ýí ÇáÕÏÑ ÚäÏ ÇáÊäÝÓ ÈÚãÞ Ãæ ÇáÓÚÇá  ÞÏ íÎÑÌ ÇáÓÚÇá ãÕÍæÈðÇ ÈÏã  ÅÌåÇÏ ÇáÝÞÑÇÊ ÇáÚäÞíÉ   ãÇ íÌÈ Úáíß ãÚÑÝÊå:   Åä ÅÌåÇÏ ÇáÝÞÑÇÊ ÇáÚäÞíÉ ÚÈÇÑÉ Úä ÔÏ Ãæ ÊãÒÞ Ýí SMTPZVJ Ãæ ÇáÃæÊÇÑ ÇáãæÌæÏÉ ÈÑÞÈÊß  VFGCKOPZ åí ÚÈÇÑÉ Úä ÃäÓÌÉ ÞæíÉ ÊÑÈØ UCOJCTO ÈÇáÚÙÇã  ÊÔãá VQLARQU ÇáÔÇÆÚÉ áÍÇáÇÊ ÅÌåÇÏ ÇáÝÞÑÇÊ ÇáÚäÞíÉ ÍæÇÏË FZDMUMKY¡ Ãæ ÇáÓÞæØ Ãæ ÇáÅÕÇÈÉ ÃËäÇÁ ããÇÑÓÉ ÇáÑíÇÖÉ  ÅÑÔÇÏÇÊ SOPDHX ÎÇÑÌ ÇáãÓÊÔÝì:   íõÑÌì ØáÈ ÇáÑÚÇíÉ ÝæÑðÇ Ýí DBVJNIF ÇáÊÇáíÉ:   · ÇáÔÚæÑ ÈÃáã Ãæ ÎÏÑ Ýí ÇáÌÒÁ ãä ÃÓÝá ÇáßÊÝ ÍÊì íÏß  · MWJKYBGP ãä ãÔßáÇÊ Ýí ÇáÑÄíÉ Ãæ ÇáÓãÚ Ãæ ÇáÊæÇÒä    · ÇáÔÚæÑ WFMGLUFZX Ãæ ÚÏã ÇáÊÑßíÒ    · ÇáãÚÇäÇÉ ãä ãÔßáÇÊ Ýí ÇáÍÑßÉ æÞæÉ ÇáÌÓã  íõÑÌì CLJQAVB ÈãÞÏã ÇáÑÚÇíÉ ÇáÕÍíÉ áÏíß Ýí CVPFYJT ÇáÊÇáíÉ:   · UWFINNAN ãä ÒíÇÏÉ ÇáÊæÑã Ãæ ÇáÔÚæÑ MFYGGW Ýí ÇáÑÞÈÉ     · ÅÐÇ ßÇäÊ áÏíß BAFWPVNJU Ãæ ãÎÇæÝ HSKAGK ÈÍÇáÊß ÇáãÑÖíøÉ Ãæ ÈÇáÑÚÇíÉ  ÇáÃÏæíÉ:  ÞÏ ÊÍÊÇÌ Åáì Ãí ããÇ íáí:  · ÇáÃÓíÊÇãíäæÝíä (Acetaminophen)  íÎÝÝ ãä ÇáÃáã æÇáÍãì  ÓæÝ ÊÌÏå AWSFRB Ïæä ÇáÍÇÌÉ Åáì æÕÝÉ ØÈíÉ  ÇÓÊÝÓÑ Úä ßãíÉ ÇáÏæÇÁ ÇáæÇÌÈ ZBBBEOL æÚÏÏ ãÑÇÊ OUMKODM  ÇÊÈÚ DBVDAKXFI  ÇÞÑÃ VNEQOGSN TYYLKKHC ÈßÇÝÉ ÇáÃÏæíÉ ÇáÃÎÑì ÇáÊí QBCIDWZH ááæÞæÝ Úáì ãÇ ÅÐÇ ßÇäÊ ÊÍÊæí ÃíÖðÇ Úáì ÇáÃÓíÊÇãíäæÝíä Ãæ ÇÓÊÝÓÑ ãä ÇáØÈíÈ Ãæ ÇáÕíÏáí  íãßä Ãä íÊÓÈÈ ÇáÃÓíÊÇãíäæÝíä Ýí ÊáÝ ÇáßÈÏ ÅÐÇ áã íÊã ÊäÇæáå ÈØÑíÞÉ ÕÍíÍÉ  áÇ ÊÓÊÎÏã ÃßËÑ ãä 4 ÌÑÇãÇÊ (4,000 ãááí ÌÑÇã) ÈÔßá ÅÌãÇáí ãä ÇáÃÓíÊÇãíäæÝíä Ýí Çáíæã ÇáæÇÍÏ  · ÊÓÇÚÏ ÇáÃÏæíÉ ÇááÇÓÊíÑæíÏíÉ ÇáãÖÇÏÉ ááÇáÊåÇÈ (NSAID) ãËá ÅíÈæÈÑæÝíä¡ Úáì ÊÎÝíÝ ÇáÊæÑã CTLWRQ æÇáÍãì   íÊæÝÑ åÐÇ ÇáÏæÇÁ ÈÃãÑ ãä ÇáØÈíÈ Ãæ Ïæäå  æíãßä Ãä ÊÊÓÈÈ ÃÏæíÉ NSAID Ýí ÍÏæË äÒíÝ ÈÇáãÚÏÉ Ãæ ãÔßáÇÊ ÈÇáßáì ÚäÏ ÈÚÖ ÇáÃÔÎÇÕ  ÅÐÇ ßäÊ YIQHDH ÃÏæíÉ áãäÚ ÊÌáØ ÇáÏã¡ ÝÇÍÑÕ ÏÇÆãðÇ Úáì ÇáÇÓÊÝÓÇÑ ãä ãÞÏã ÇáÑÚÇíÉ ÇáÕÍíÉ ÚãÇ ÅÐÇ ßÇäÊ ÇáÃÏæíÉ ÇááÇÓÊíÑæíÏíÉ ÇáãÖÇÏÉ ááÇáÊåÇÈ ÂãäÉ áß Ãã áÇ  ÇÞÑÃ ÏÇÆãðÇ ÇáãáÕÞ ÇáØÈí æÇÊÈÚ ÇáÊÚáíãÇÊ  · ãÑÎíÇÊ XYEPQRJ  ÊÓÇÚÏ Úáì ÊÞáíá ÇáÃáã æÊÔäÌÇÊ ÇáÚÖáÇÊ  · ËãÉ ÏæÇÁ íÕÝå ÇáØÈíÈ áÊÓßíä ÇáÃáã  ÞÏ íÊã ÅÚØÇÄå  ÇÓÊÝÓÑ ãä ãÞÏã ÇáÑÚÇíÉ ÇáÕÍíÉ Úä ßíÝíÉ ÊäÇæá åÐÇ ÇáÏæÇÁ ÈÃãÇä  ÊÍÊæí ÈÚÖ ÇáÃÏæíÉ ÇáãæÕæÝÉ áÊÓßíä ÇáÃáã Úáì ÇáÃÓíÊÇãíäæÝíä  áÇ ÊÞã ÈÊäÇæá ÃÏæíÉ ÃÎÑì ÊÍÊæí Úáì ÇáÃÓíÊÇãíäæÝíä ãä Ïæä ÇáÊÍÏË ãÚ ãÞÏã ÇáÑÚÇíÉ ÇáÕÍíÉ áÏíß  ÊäÇæá ÇáßËíÑ ãä ÇáÃÓíÊÇãíäæÝíä íãßä Ãä íÊÓÈÈ Ýí ÍÏæË ÊáÝ ÈÇáßÈÏ  ÞÏ íÊÓÈÈ ÇáÏæÇÁ ÇáãæÕæÝ áÊÓßíä ÇáÃáã Ýí ÍÏæË ÅãÓÇß  ÇÓÊÝÓÑ ãä ãÞÏã ÇáÑÚÇíÉ ÇáÕÍíÉ áÏíß Úä ßíÝíÉ ÊÌäÈ ÍÏæË ÇáÅãÓÇß Ãæ ÚáÇÌå  · ÊäÇæá ÇáÏæÇÁ æÝÞðÇ ááÅÑÔÇÏÇÊ  ÇÊÕá ÈãÞÏã ÇáÑÚÇíÉ ÇáÕÍíÉ áÏíß ÅÐÇ ßäÊ ÊÚÊÞÏ Ãä ÇáÏæÇÁ ÇáÐí QQHETGQ áÇ íÝíÏß Ãæ ÅÐÇ ßäÊ ÊÚÇäí ãä ÂËÇÑ ÌÇäÈíÉ  ÃÎÈÑå Ãæ ÃÎÈÑåÇ ÅÐÇ ßÇäÊ áÏíß ÍÓÇÓíÉ ãä Ãí ÏæÇÁ  ÇÍÊÝÙ ÈÞÇÆãÉ ÇáÃÏæíÉ æÇáÝíÊÇãíäÇÊ KMEHSYMN ÇáÊí ÊÊäÇæáåÇ  ÃÏÑöÌ ÝíåÇ ÌÑÚÇÊ ÇáÃÏæíÉ æãæÇÚíÏåÇ æÓÈÈ ÊäÇæáåÇ  ÃÍÖöÑ ãÚß åÐå ÇáÞÇÆãÉ Ãæ ÚÈæÇÊ ÇáÃÞÑÇÕ ÚäÏ ÒíÇÑÇÊ ÇáãÊÇÈÚÉ  Ganesh Coupe ÞÇÆãÉ ÇáÃÏæíÉ ãÚß ÊÍÓÈðÇ áÍÇáÇÊ ÇáØæÇÑÆ  ÇáÊÍßã Ýí ÇáÃÚÑÇÖ:   · íõÑÌì æÖÚ ÔíÁ ÓÇÎä  Úáì ÑÞÈÊß áãÏÉ ÊÊÑÇæÍ Èíä 15 æ20 ÏÞíÞÉ¡ Ãæ ãä ÃÑÈÚ Åáì ÓÊ ãÑÇÊ Ãæ æÝÞðÇ HEXKXQZGR  ÝÅä YOEKGRG ÊÓÇÚÏ Úáì ÊÞáíá ÇáÃáã Paralee Haymaker NQPJAPR æÊÔäÌÇÊ ÇáÚÖáÇÊ  · ÇÈÏÃ Ýí ããÇÑÓÉ ÊãÑíäÇÊ áØíÝÉ ááÑÞÈÉ  ÈÃÓÑÚ æÞÊ ããßä ÍíäãÇ ÊÓÊØíÚ ÊÍÑíß ÑÞÈÊß Ïæä ÇáÔÚæÑ ÈÃáã  ÊÓÇÚÏ ÇáÊãÑíäÇÊ ÇáÑíÇÖíÉ Úáì ÇáÊÞáíá ãä ÍÏÉ ÇáÊíÈÓ æÊÒíÏ ãä ÞæÉ ÑÞÈÊß [id number] Úáì ÊÍÑíßåÇ  ÇÓÊÝÓÑ ãä ãÞÏã ÇáÑÚÇíÉ ÇáÕÍíÉ Úä äæÚ ÇáÊãÑíäÇÊ ÇáÑíÇÖíÉ ÇáÊí íÌÈ Úáíß ããÇÑÓÊåÇ  · ÚÏ Åáì ÃäÔØÊß ÇáãÚÊÇÏÉ ÈÔßá ÊÏÑíÌí ÍÓÈ VLGNPRIVN  ÊæÞÝ ÅÐÇ ÔÚÑÊ ÈÃáã  ÊÌäÈ ÇáÃäÔØÉ ÇáÊí ÞÏ ÊÍÏË ÇáãÒíÏ ãä ÇáÖÑÑ ÈÑÞÈÊß¡ ãËá ÑÝÚ SGWHLST Ãæ ÇáÊãÇÑíä ÇáÑíÇÖíÉ ÇáÚäíÝÉ  · áÇ ÊÓÊÎÏã ÇáæÓÇÏÉ ÃËäÇÁ Çáäæã  ßí ÊÓÇÚÏ Ýí ÊÎÝíÝ ÇáÃáã   Þã ÈáÝ ãäÔÝÉ ÈÅÍßÇã æÖÚåÇ ÊÍÊ ÑÞÈÊß ÈÏáÇð ãä Ðáß  · ÇáÊÒã ÈÌáÓÇÊ ÇáÚáÇÌ Leory Patience [id number]  íãßä Ãä íÚáãß ãÊÎÕÕ ÇáÚáÇÌ ÇáØÈíÚí ÊÏÑíÈÇÊ ááãÓÇÚÏÉ Úáì ÊÍÓíä ÇáÍÑßÉ æÊÞáíá ÇáÃáã  ãäÚ ÇáÅÕÇÈÉ ÈÇáÑÞÈÉ:   · ÇÊÈÚ ØÑíÞÉ ÂãäÉ ááÞíÇÏÉ  ÊÃßÏ ãä ÌãíÚ ÇáÑÇßÈíä ÈÓíÇÑÊß íÓÊÎÏãæä ÍÒÇã ÇáÃãÇä  Ýíãßä áÍÒÇã ÇáÃãÇä Ãä íäÞÐ ÍíÇÊß Ýí ÍÇáÉ ÊÚÑÖß áÍÇÏË  ÊÌäÈ ERTMREF ÇáåÇÊÝ TGEDHJF ÃËäÇÁ ÇáÞíÇÏÉ  ÝÞÏ íÊÓÈÈ Ðáß Ýí ÊÔÊíÊ ÇäÊÈÇåß æíÚÑÖß áÍÇÏË  ÃæÞÝ ÇáÓíÇÑÉ Úáì ÌÇäÈ ÇáØÑíÞ ÅÐÇ ÇÍÊÌÊ áÅÌÑÇÁ ãßÇáãÉ Ãæ ÅÑÓÇá ÑÓÇáÉ äÕíÉ  · ÇÑÊÏö ÎæÐÉ¡ æÓÊÑÉ ÇáäÌÇÉ ãä ÇáÛÑÞ¡ æÇáãáÇÈÓ ÇáæÇÞíÉ  ÇÍÑÕ ÏÇÆãðÇ Úáì ÇÑÊÏÇÁ ÎæÐÉ ÚäÏãÇ ÊÑßÈ ÏÑÇÌÉ Ãæ ÏÑÇÌÉ äÇÑíÉ¡ Ãæ ããÇÑÓÉ ÇáÊÒáÌ¡ Ãæ ããÇÑÓÉ ÇáÃáÚÇÈ ÇáÑíÇÖíÉ ÇáÊí ÞÏ ÊÓÈÈ ÅÕÇÈÉ ÈÇáÑÃÓ  ÇÑÊÏö ÃÌåÒÉ æÇÞíÉ ÚäÏ ããÇÑÓÉ ÇáÃáÚÇÈ ÇáÑíÇÖíÉ  ÇÑÊÏö ÓÊÑÉ ÇáäÌÇÉ ãä ÇáÛÑÞ ÚäÏ ÑßæÈß ÇáãÑßÈ Ãæ ããÇÑÓÉ ÇáÃáÚÇÈ ÇáãÇÆíÉ  íÌÈ ÇáãÊÇÈÚÉ ãÚ ãÞÏã ÇáÑÚÇíÉ ÇáÕÍíÉ HPDZNEU Úäß æÝÞðÇ ááÅÑÔÇÏÇÊ:  æÞÏ ÊõÍÇá Åáì ØÈíÈ ÚÙÇã Ãæ ãÑÇßÒ ÇáÚáÇÌ ÇáØÈíÚí  íõÑÌì ÊÏæíä ÃÓÆáÊß áÊÊÐßøÑ ØÑÍåÇ ÃËäÇÁ ÒíÇÑÇÊß ááØÈíÈ  © 2017 [employer] Information is for End User's use only and may not be sold, redistributed or otherwise used for commercial purposes  All illustrations and images included in CareNotes® are the copyrighted property of A D A M , [employer]  or Vahid Jacobo  The above information is an  only  It is not intended as medical advice for individual conditions or treatments  Talk to your doctor, nurse or pharmacist before following any medical regimen to see if it is safe and effective for you  ·   © 2017 [employer] Information is for End User's use only and may not be sold, redistributed or otherwise used for commercial purposes  All illustrations and images included in CareNotes® are the copyrighted property of [employer]  or Vahid Jacobo  The above information is an  only  It is not intended as medical advice for individual conditions or treatments   Talk to your doctor, nurse or pharmacist before following any medical regimen to see if it is safe and effective for you

## 2019-02-04 ENCOUNTER — OFFICE VISIT (OUTPATIENT)
Dept: SPEECH THERAPY | Facility: CLINIC | Age: 59
End: 2019-02-04
Payer: COMMERCIAL

## 2019-02-04 ENCOUNTER — OFFICE VISIT (OUTPATIENT)
Dept: PHYSICAL THERAPY | Facility: CLINIC | Age: 59
End: 2019-02-04
Payer: COMMERCIAL

## 2019-02-04 DIAGNOSIS — I63.9 CEREBROVASCULAR ACCIDENT (CVA), UNSPECIFIED MECHANISM (HCC): Primary | ICD-10-CM

## 2019-02-04 DIAGNOSIS — R29.90 STROKE-LIKE SYMPTOMS: ICD-10-CM

## 2019-02-04 DIAGNOSIS — R48.8 OTHER SYMBOLIC DYSFUNCTIONS: Primary | ICD-10-CM

## 2019-02-04 PROCEDURE — 97140 MANUAL THERAPY 1/> REGIONS: CPT | Performed by: PHYSICAL THERAPIST

## 2019-02-04 PROCEDURE — 92507 TX SP LANG VOICE COMM INDIV: CPT | Performed by: SPEECH-LANGUAGE PATHOLOGIST

## 2019-02-04 PROCEDURE — 97110 THERAPEUTIC EXERCISES: CPT | Performed by: PHYSICAL THERAPIST

## 2019-02-04 NOTE — PROGRESS NOTES
Daily Speech Treatment Note    Today's date: 2019  Patients name: Efe Haile  : 1960  MRN: [id number]  Safety measures: N/A  Referring provider: Mateo Whatley MD    Primary Diagnosis/Billing code: S67 9      Visit Tracking:  -Referring provider: Epic  -Billing guidelines: AMA  -Visit #   -CBC  no secondary  (no authorization required)  -RE due 2019  Pain: 7/10, patient reports that he has two broken toes  Subjective/Behavioral:  -Patient reports that he is well on this date  Objective/Assessment:  -Patient did not have HEP from previous session  Short-term goals:  Goal 1: Patient will be educated on the use of internal and external memory aids and compensatory strategies with 80% accuracy to facilitate increased recall of routine, personal information, and recent events, to be achieved in 4-6 weeks  --See data under goal 6       Goal 2: Patient will demonstrate functional use of external memory across 3 sessions with 80% accuracy to facilitate carryover of strategies in functional living environment, to be achieved in 4-6 weeks  --Did not target in this session       Goal 3: Patient will complete auditory immediate and short term memory tasks to 80% accuracy to facilitate increased ability to retell narratives and recall information within functional living environment, to be achieved in 4-6 weeks  --Did not target in this session       Goal 4: Patient will complete thought organization tasks (e g , sequencing, deduction puzzles, etc ) with 80% accuracy to facilitate increased executive functioning skills, to be achieved in 4-6 weeks  --Did not target in this session       Goal 5: Patient will complete reading comprehension tasks (e g , answering questions, following complex written directions, etc ) to 80% accuracy to facilitate carryover of comprehension of functional reading materials, to be achieved in 4-6 weeks      --Patient was presented with two step written directions about shapes and asked to complete  Patient demonstrated difficulty with this task  He was noted to have difficulty reading the names of shapes such as triangle and rectangle  He also demonstrated difficulty following one of the two directions in two step directions  Patient was able to complete the task with approximately 30% acc  This is similar to last session  Most commands were repeated to help with comprehension        Goal 6: Patient will complete word generation tasks (e g , analogies, category matrices, etc ) with 80% accuracy using word finding strategies to facilitate improved word retrieval skills, to be achieved in 4-6 weeks  --Patient was able to complete concrete categorization with 78% accuracy  He displayed 3 perseverative errors during this task  He was able to add to the category (concrete level) with 79% accuracy  Patient's premorbid fund of general knowledge may inhibit mid to high level language tasks       Goal 7:Patient will name up to 5 features to describe a person/place/thing with 80% accuracy to facilitate improved utilization of circumlocution strategy, to be achieved in 4-6 weeks    --Did not target in this session       Goal 8: Patient will perform oral motor and diadochokinetic speech rate exercises with > 90% accuracy to facilitate increased speech intelligibility, to be achieved in 4-6 weeks  --Did not target in this session  Plan:  -Continue with current plan of care

## 2019-02-06 ENCOUNTER — APPOINTMENT (OUTPATIENT)
Dept: SPEECH THERAPY | Facility: CLINIC | Age: 59
End: 2019-02-06
Payer: COMMERCIAL

## 2019-02-06 ENCOUNTER — TELEPHONE (OUTPATIENT)
Dept: NEUROLOGY | Facility: CLINIC | Age: 59
End: 2019-02-06

## 2019-02-06 ENCOUNTER — OFFICE VISIT (OUTPATIENT)
Dept: PHYSICAL THERAPY | Facility: CLINIC | Age: 59
End: 2019-02-06
Payer: COMMERCIAL

## 2019-02-06 DIAGNOSIS — I63.9 CEREBROVASCULAR ACCIDENT (CVA), UNSPECIFIED MECHANISM (HCC): Primary | ICD-10-CM

## 2019-02-06 PROCEDURE — 97140 MANUAL THERAPY 1/> REGIONS: CPT | Performed by: PHYSICAL THERAPIST

## 2019-02-06 PROCEDURE — 97110 THERAPEUTIC EXERCISES: CPT | Performed by: PHYSICAL THERAPIST

## 2019-02-06 NOTE — PROGRESS NOTES
Daily Speech Treatment Note    Today's date: 2019  Patients name: Milton Muniz  : 1960  MRN: [id number]  Safety measures: N/A  Referring provider: Brian Davalos MD    Primary Diagnosis/Billing code: J05 2      Visit Tracking:  -Referring provider: Epic  -Billing guidelines: AMA  -Visit #   -CBC  no secondary  (no authorization required)  -RE due 2019  Pain: 7/10, patient reports that he has two broken toes  Subjective/Behavioral:  -Patient reports that he is well on this date  Objective/Assessment:  -Patient did not have HEP from previous session  Short-term goals:  Goal 1: Patient will be educated on the use of internal and external memory aids and compensatory strategies with 80% accuracy to facilitate increased recall of routine, personal information, and recent events, to be achieved in 4-6 weeks  --See data under goal 6       Goal 2: Patient will demonstrate functional use of external memory across 3 sessions with 80% accuracy to facilitate carryover of strategies in functional living environment, to be achieved in 4-6 weeks  --Did not target in this session       Goal 3: Patient will complete auditory immediate and short term memory tasks to 80% accuracy to facilitate increased ability to retell narratives and recall information within functional living environment, to be achieved in 4-6 weeks  --Did not target in this session       Goal 4: Patient will complete thought organization tasks (e g , sequencing, deduction puzzles, etc ) with 80% accuracy to facilitate increased executive functioning skills, to be achieved in 4-6 weeks  --Did not target in this session       Goal 5: Patient will complete reading comprehension tasks (e g , answering questions, following complex written directions, etc ) to 80% accuracy to facilitate carryover of comprehension of functional reading materials, to be achieved in 4-6 weeks      --Patient was presented with two step written directions about shapes and asked to complete  Patient demonstrated difficulty with this task  He was noted to have difficulty reading the names of shapes such as triangle and rectangle  He also demonstrated difficulty following one of the two directions in two step directions  Patient was able to complete the task with approximately 30% acc  This is similar to last session  Most commands were repeated to help with comprehension        Goal 6: Patient will complete word generation tasks (e g , analogies, category matrices, etc ) with 80% accuracy using word finding strategies to facilitate improved word retrieval skills, to be achieved in 4-6 weeks  --Patient was able to complete concrete categorization with 78% accuracy  He displayed 3 perseverative errors during this task  He was able to add to the category (concrete level) with 79% accuracy  Patient's premorbid fund of general knowledge may inhibit mid to high level language tasks       Goal 7:Patient will name up to 5 features to describe a person/place/thing with 80% accuracy to facilitate improved utilization of circumlocution strategy, to be achieved in 4-6 weeks    --Did not target in this session       Goal 8: Patient will perform oral motor and diadochokinetic speech rate exercises with > 90% accuracy to facilitate increased speech intelligibility, to be achieved in 4-6 weeks  --Did not target in this session  Plan:  -Continue with current plan of care

## 2019-02-06 NOTE — PROGRESS NOTES
Daily Note     Today's date: 2019  Patient name: Nubia Landers  : 1960  MRN: [id number]  Referring provider: Raymundo Escamilla MD  Dx:   Encounter Diagnosis     ICD-10-CM    1  Cerebrovascular accident (CVA), unspecified mechanism ([facility] [address] ) I63 9        Start Time: 283  Stop Time: 1640  Total time in clinic (min): 55 minutes  Subjective: Patient reports neck pain feels better when he leaves PT but then it gets worse again when he gets home  Reports he is performing cervical AROM and using prescription gels  Objective: See treatment diary below   cervical spine x 8 min  STM cervical paraspinals, upper trap, scalenes, SCM  IASTM cervical paraspinals, upper trap, scalenes, SCM  Cervical PROM rotation and sidebending   Cervical AROM rotation 10 reps each  SNAGs for rotation 3 reps 30 sec hold       Assessment:   Pt tolerated treatment session well today  Pt had improved tolerance to STM today  Also trialed gentle IASTM which pt tolerated well  Pt reports feeling better afterwards but short lasting effects  Continued education on using [address] at home and performing gentle AROM, as well as positioning in recliner chair  Introduced SNAGs to improve cervical ROM; required heavy vc's and demonstration to perform correctly  Pt will continue to benefit from skilled outpatient PT services to improve his balance and mobility to maximize his function  Plan: Continue per plan of care

## 2019-02-06 NOTE — TELEPHONE ENCOUNTER
Attempted to call patient to review his neck x-ray results  Unable to reach and unable to leave message  Will have office staff attempt to call him to review with him this week        Dr Alanna Araiza  PM&R

## 2019-02-07 ENCOUNTER — TELEPHONE (OUTPATIENT)
Dept: NEUROLOGY | Facility: CLINIC | Age: 59
End: 2019-02-07

## 2019-02-07 NOTE — TELEPHONE ENCOUNTER
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

## 2019-02-11 ENCOUNTER — OFFICE VISIT (OUTPATIENT)
Dept: SPEECH THERAPY | Facility: CLINIC | Age: 59
End: 2019-02-11
Payer: COMMERCIAL

## 2019-02-11 DIAGNOSIS — R48.8 OTHER SYMBOLIC DYSFUNCTIONS: Primary | ICD-10-CM

## 2019-02-11 DIAGNOSIS — R29.90 STROKE-LIKE SYMPTOMS: ICD-10-CM

## 2019-02-11 PROCEDURE — 92507 TX SP LANG VOICE COMM INDIV: CPT | Performed by: SPEECH-LANGUAGE PATHOLOGIST

## 2019-02-11 NOTE — PROGRESS NOTES
Daily Speech Treatment Note    Today's date: 2019  Patients name: Nelson Montez  : 1960  MRN: [id number]  Safety measures: N/A  Referring provider: Alannah Saavedra MD    Primary Diagnosis/Billing code: H41 5      Visit Tracking:  -Referring provider: Epic  -Billing guidelines: AMA  -Visit #   -CBC  no secondary  (no authorization required)  -RE due 2019  Pain: 8/10, patient reports that he has two broken toes  Subjective/Behavioral:  -Patient reports that he is well on this date  Objective/Assessment:  -Patient did not have HEP from previous session  Short-term goals:  Goal 1: Patient will be educated on the use of internal and external memory aids and compensatory strategies with 80% accuracy to facilitate increased recall of routine, personal information, and recent events, to be achieved in 4-6 weeks  --See data under goal 6       Goal 2: Patient will demonstrate functional use of external memory across 3 sessions with 80% accuracy to facilitate carryover of strategies in functional living environment, to be achieved in 4-6 weeks  --Did not target in this session       Goal 3: Patient will complete auditory immediate and short term memory tasks to 80% accuracy to facilitate increased ability to retell narratives and recall information within functional living environment, to be achieved in 4-6 weeks  --Did not target in this session       Goal 4: Patient will complete thought organization tasks (e g , sequencing, deduction puzzles, etc ) with 80% accuracy to facilitate increased executive functioning skills, to be achieved in 4-6 weeks  --Did not target in this session       Goal 5: Patient will complete reading comprehension tasks (e g , answering questions, following complex written directions, etc ) to 80% accuracy to facilitate carryover of comprehension of functional reading materials, to be achieved in 4-6 weeks      --Patient noted to misread several words during a word finding task (labeling from description)  Some reading errors may be  be premorbid in nature       Goal 6: Patient will complete word generation tasks (e g , analogies, category matrices, etc ) with 80% accuracy using word finding strategies to facilitate improved word retrieval skills, to be achieved in 4-6 weeks  --Patient was able to complete concrete categorization with 70% accuracy  He displayed 4 perseverative errors during this task  He was able to label objects from their descriptions with 75% accuracy  Patient's premorbid fund of general knowledge may inhibit mid to high level language tasks  Patient is generally able to express wants and needs with fair to good accuracy       Goal 7:Patient will name up to 5 features to describe a person/place/thing with 80% accuracy to facilitate improved utilization of circumlocution strategy, to be achieved in 4-6 weeks    --Did not target in this session       Goal 8: Patient will perform oral motor and diadochokinetic speech rate exercises with > 90% accuracy to facilitate increased speech intelligibility, to be achieved in 4-6 weeks  --Did not target in this session  Plan:  -Continue with current plan of care

## 2019-02-11 NOTE — PROGRESS NOTES
Occupational Therapy Fit to Drive Evaluation:  Today's Date: 2019  Patient Name: Godfrey Henry  : 1960  MRN: [id number]  Referring Provider: Jose R Ambrose MD  Dx: No primary diagnosis found  Active Problem List:   Patient Active Problem List   Diagnosis    Type 2 diabetes mellitus with hyperglycemia, with long-term current use of insulin (HCC)    Mixed hyperlipidemia    Benign hypertension with CKD (chronic kidney disease) stage III ([facility] )    Cerebrovascular accident (CVA) due to thrombosis of left middle cerebral artery (HCC)    Cognitive impairment    Elevated alkaline phosphatase level    CKD (chronic kidney disease)    Diabetic macular edema (HCC)    GERD (gastroesophageal reflux disease)    Cirrhosis ([facility] )    Diabetic polyneuropathy associated with type 2 diabetes mellitus (HCC)    Other constipation    Abnormal EEG    History of stroke    TIA (transient ischemic attack)    Stroke-like symptoms, with right-sided weakness    Hypoglycemia, transient episode     Past Medical Hx:   Past Medical History:   Diagnosis Date    Diabetes mellitus ([facility] )     Hypercholesteremia     Hyperlipidemia     Hypertension     Neuropathy     Obesity     [facility]     last assessed 16    PVC's (premature ventricular contractions)     sees cardiology Dr Heather camargo    Stroke [facility])     last weeof 2018 [employer]     Past Surgical Hx:   Past Surgical History:   Procedure Laterality Date    ABDOMINAL SURGERY      CHOLECYSTECTOMY      Percutaneous    OTHER SURGICAL HISTORY      "stimulator to control bowel movements"    [place] ESOPHAGOGASTRODUODENOSCOPY TRANSORAL DIAGNOSTIC N/A 2016    Procedure: ESOPHAGOGASTRODUODENOSCOPY (EGD); Surgeon: Symone Seth MD;  [facility];   Service: Gastroenterology    [place] LAP,CHOLECYSTECTOMY N/A 2016    Procedure: LAPAROSCOPIC CHOLECYSTECTOMY ;  Surgeon: Miguel Biggs DO;  [facility]; Service: General    ROTATOR CUFF REPAIR Right     TOE AMPUTATION Right 10/28/2016    Procedure: 3RD TOE AMPUTATION ;  Surgeon: Marian Emmanuel DPM;  [facility];  Service:         Pain Levels:   Resting:  {gen number 1-00:841063}    With Activity:  {gen number 5-36:106352}    OT low complexity eval: ***  OT DCAT, Reaction Times Trials, OPTEC Vision Screen, and LACLS: ***    Subjective/Patient Goal: "***"    History of Present Illness:  Pt is a *** 62 y o  male seen for OT eval s/p referred to [facility] s/p  dx'd w/ , comorbidities as listed above  Lifestyle Performance Model:  Autonomy: Pt was   Reciprocal Relationships: Supportive   Service to Others: Pt is   Intrinsic Gratification: Enjoys   Home Setup: Pt lives    Driving History:  Vehicle Type:   ***Car,     ***Truck,     ***Motorcycle  Visual History:   ***Glasses,     ***Contacts   ***Cataracts,     ***Glaucoma,     ***[place],     ***Jackelin Fischer ***  Communication Status:   ***English,     ***Romanian  Driving History:   ***GPS use,     ***History of getting lost,    ***Tickets,     ***DUI  License Status:   ***Active,     ***Inactive  Last Drove:   ***  Last Accident:   ***  Initial License Date:   ***  Driving Goals:   ***Local     ***Highway  Car Transfer:   ***    Objective  Functional/Cognitive Impairments:  1  DCAT: (see attached report for further details) Pt scoring an ***% likelihood on failing on road    *** Fit,     ***Unfit  Recommend On Road Assessment:   ***Yes,     ***No  Recommend to [employer] [place] [employer] [place]   ***Yes,     ***No,     Due to: ***  2   OPTEC vision screen: (see attached)   Contrast sensitivity: ***   Far acuity: 20/***    Near acuity: 20/***   Color perception: ***   Lateral phoria: ***   Depth perception far: ***   Sign recognition: able to ID ***/12 road signs correctly   Color recognition: ***  3   Reaction time trials: see attached   trial 1) ***, trial 2) ***, trial 3) ***, average of 3 trials: ***, Falling within the 25th %ile for reaction time  4   Rapid Pace Walk Test:  *** seconds: Those with greater than 9 seconds had a 3 fold increase risk of being in an at fault auto accident  5   Physical findings:  cervical rotation R ***, L ***; UE and LE AROM full with *** strength   6  Probability of creating a hazardous situation on specialized on-road test: ***%; see attached report for further details  7   Recommendations:  ***    Assessment/Plan  Occupational Therapy Skilled Analysis Assessment and Plan of Care:  Pt is a 62 y o  male referred to Occupational Therapy for Fitness to Drive Evaluation to assess pts cognitive, visual, and motor abilities to drive safely in community environment  Pt requires overall mod I for ADLs/self care and mod I for fx'l mobility w/ *** DME  Pt is currently demonstrating the following occupational deficits: limited 2* ***   ***  Based on the aforementioned OT evaluation, functional performance deficits, and assessments, pt has been identified as a low complexity evaluation  No further skillable OT needs indicated as pt referred for Fitness to Drive Evaluation only per consult script, D/C from OT caseload, D/C OT  MD to discuss results w/ pt  INTERVENTION COMMENTS:  Diagnosis: No primary diagnosis found  Precautions: ***  FOTO: N/A for FTD Evaluations  Insurance: ***    Thank you for the consult!   Please call if you have any questions: [phone]  Sandeep Lemus, OTD, OTR/L, C-GCM, CSRS  Director of Outpatient Neuro Occupational Therapy

## 2019-02-12 ENCOUNTER — APPOINTMENT (OUTPATIENT)
Dept: OCCUPATIONAL THERAPY | Facility: CLINIC | Age: 59
End: 2019-02-12
Payer: COMMERCIAL

## 2019-02-13 ENCOUNTER — OFFICE VISIT (OUTPATIENT)
Dept: SPEECH THERAPY | Facility: CLINIC | Age: 59
End: 2019-02-13
Payer: COMMERCIAL

## 2019-02-13 ENCOUNTER — OFFICE VISIT (OUTPATIENT)
Dept: PHYSICAL THERAPY | Facility: CLINIC | Age: 59
End: 2019-02-13
Payer: COMMERCIAL

## 2019-02-13 DIAGNOSIS — R48.8 OTHER SYMBOLIC DYSFUNCTIONS: Primary | ICD-10-CM

## 2019-02-13 DIAGNOSIS — I63.9 CEREBROVASCULAR ACCIDENT (CVA), UNSPECIFIED MECHANISM (HCC): Primary | ICD-10-CM

## 2019-02-13 PROCEDURE — 92507 TX SP LANG VOICE COMM INDIV: CPT

## 2019-02-13 PROCEDURE — 97110 THERAPEUTIC EXERCISES: CPT | Performed by: PHYSICAL THERAPIST

## 2019-02-13 PROCEDURE — 97140 MANUAL THERAPY 1/> REGIONS: CPT | Performed by: PHYSICAL THERAPIST

## 2019-02-13 NOTE — PROGRESS NOTES
Daily Note     Today's date: 2019  Patient name: Marianne Page  : 1960  MRN: [id number]  Referring provider: Gilford Mohair, MD  Dx:   Encounter Diagnosis     ICD-10-CM    1  Cerebrovascular accident (CV[facility]), unspecified [facility] ([facility] I63 9                 Subjective: Patient continues to report neck pain feels better when he leaves PT but then it gets worse again when he gets home  Objective: See treatment diary below   cervical spine x 10 min  STM cervical paraspinals, upper trap, scalenes, SCM  IASTM cervical paraspinals, upper trap, scalenes, SCM  Cervical PROM rotation and sidebending   Cervical AROM rotation 10 reps each  SNAGs for rotation 3 reps 30 sec hold   SNAGs for extension 3 reps 30 sec hold    Assessment: Patient was able to tolerate treatment session well today  Patient noted increase in neck pain upon arrival to PT today that was decreased following manual therapy  He continued to tolerate gentle IASTM well with improvement in cervical ROM  H was challenged with SNAG exercises and required verbal and tactile cues to perform them correctly  Patient displayed improved ROM and reduction in pain symptoms following session today  Patient will continue to benefit from skilled outpatient PT services to improve his balance and mobility to maximize his function  Plan: Continue per plan of care

## 2019-02-13 NOTE — PROGRESS NOTES
Daily Speech Treatment Note    Today's date: 2019  Patients name: Marianne Page  : 1960  MRN: [id number]  Safety measures: N/A  Referring provider: Gilford Mohair, MD    Primary Diagnosis/Billing code: I69 9      Visit Tracking:  -Referring provider: Epic  -Billing guidelines: AMA  -Visit #   -CBC  no secondary  (no authorization required)  -RE due 2019  Pain: 8/10, patient reports that this is secondary to broken toes  Subjective/Behavioral:  -Patient reports that he is well on this date  Objective/Assessment:  -Patient did not have HEP from previous session  Short-term goals:  Goal 1: Patient will be educated on the use of internal and external memory aids and compensatory strategies with 80% accuracy to facilitate increased recall of routine, personal information, and recent events, to be achieved in 4-6 weeks  --See data under goal 6       Goal 2: Patient will demonstrate functional use of external memory across 3 sessions with 80% accuracy to facilitate carryover of strategies in functional living environment, to be achieved in 4-6 weeks  --Did not target in this session       Goal 3: Patient will complete auditory immediate and short term memory tasks to 80% accuracy to facilitate increased ability to retell narratives and recall information within functional living environment, to be achieved in 4-6 weeks  --Did not target in this session       Goal 4: Patient will complete thought organization tasks (e g , sequencing, deduction puzzles, etc ) with 80% accuracy to facilitate increased executive functioning skills, to be achieved in 4-6 weeks  --Patient was visually and verbally presented with a group of three to four words  He was then asked to rearrange each group of words so their meanings progresses in degree or occurrence  Task completed with 40% acc, increased to 66% acc given verbal prompt from clinician       Goal 5: Patient will complete reading comprehension tasks (e g , answering questions, following complex written directions, etc ) to 80% accuracy to facilitate carryover of comprehension of functional reading materials, to be achieved in 4-6 weeks  --Did not target in this session       Goal 6: Patient will complete word generation tasks (e g , analogies, category matrices, etc ) with 80% accuracy using word finding strategies to facilitate improved word retrieval skills, to be achieved in 4-6 weeks  --Patient was able to complete concrete categorization (state the category) with 72% accuracy, increased to 95% acc given verbal prompts from the clinician  He demonstrated some difficulty with knowledge of items described in the activity  This deficit in knowledge appears to be premorbid in nature  --To target word generation, clinician presented a verbal description of an item and the initial letter of the name of the item  Patient was asked to identify the concrete category member (e g , name a state that begins with the letter "A")  Task completed with 62% acc, increased to 100% acc given moderate to maximal verbal semantic prompts       Goal 7:Patient will name up to 5 features to describe a person/place/thing with 80% accuracy to facilitate improved utilization of circumlocution strategy, to be achieved in 4-6 weeks    --Did not target in this session       Goal 8: Patient will perform oral motor and diadochokinetic speech rate exercises with > 90% accuracy to facilitate increased speech intelligibility, to be achieved in 4-6 weeks  --Did not target in this session  Plan:  -Continue with current plan of care

## 2019-02-14 ENCOUNTER — APPOINTMENT (OUTPATIENT)
Dept: PHYSICAL THERAPY | Facility: CLINIC | Age: 59
End: 2019-02-14
Payer: COMMERCIAL

## 2019-02-14 ENCOUNTER — APPOINTMENT (EMERGENCY)
Dept: RADIOLOGY | Facility: HOSPITAL | Age: 59
DRG: 603 | End: 2019-02-14
Payer: COMMERCIAL

## 2019-02-14 ENCOUNTER — HOSPITAL ENCOUNTER (INPATIENT)
Facility: HOSPITAL | Age: 59
LOS: 2 days | Discharge: HOME | DRG: 603 | End: 2019-02-16
Attending: EMERGENCY MEDICINE | Admitting: INTERNAL MEDICINE
Payer: COMMERCIAL

## 2019-02-14 DIAGNOSIS — E11.621 DIABETIC ULCER OF TOE OF LEFT FOOT ASSOCIATED WITH TYPE 2 DIABETES MELLITUS, LIMITED TO BREAKDOWN OF SKIN (HCC): ICD-10-CM

## 2019-02-14 DIAGNOSIS — L08.9 WOUND INFECTION: Primary | ICD-10-CM

## 2019-02-14 DIAGNOSIS — L97.521 DIABETIC ULCER OF TOE OF LEFT FOOT ASSOCIATED WITH TYPE 2 DIABETES MELLITUS, LIMITED TO BREAKDOWN OF SKIN (HCC): ICD-10-CM

## 2019-02-14 DIAGNOSIS — T14.8XXA WOUND INFECTION: Primary | ICD-10-CM

## 2019-02-14 PROBLEM — L03.90 CELLULITIS: Status: ACTIVE | Noted: 2019-02-14

## 2019-02-14 LAB
ALBUMIN SERPL BCP-MCNC: 2.9 G/DL (ref 3.5–5)
ALP SERPL-CCNC: 209 U/L (ref 46–116)
ALT SERPL W P-5'-P-CCNC: 24 U/L (ref 12–78)
ANION GAP SERPL CALCULATED.3IONS-SCNC: 10 MMOL/L (ref 4–13)
AST SERPL W P-5'-P-CCNC: 14 U/L (ref 5–45)
BASOPHILS # BLD AUTO: 0.02 THOUSANDS/ΜL (ref 0–0.1)
BASOPHILS NFR BLD AUTO: 0 % (ref 0–1)
BILIRUB SERPL-MCNC: 0.3 MG/DL (ref 0.2–1)
BUN SERPL-MCNC: 54 MG/DL (ref 5–25)
CALCIUM SERPL-MCNC: 8.9 MG/DL (ref 8.3–10.1)
CHLORIDE SERPL-SCNC: 105 MMOL/L (ref 100–108)
CO2 SERPL-SCNC: 24 MMOL/L (ref 21–32)
CREAT SERPL-MCNC: 2.84 MG/DL (ref 0.6–1.3)
EOSINOPHIL # BLD AUTO: 0.15 THOUSAND/ΜL (ref 0–0.61)
EOSINOPHIL NFR BLD AUTO: 2 % (ref 0–6)
ERYTHROCYTE [DISTWIDTH] IN BLOOD BY AUTOMATED COUNT: 13.6 % (ref 11.6–15.1)
GFR SERPL CREATININE-BSD FRML MDRD: 23 ML/MIN/1.73SQ M
GLUCOSE SERPL-MCNC: 134 MG/DL (ref 65–140)
GLUCOSE SERPL-MCNC: 217 MG/DL (ref 65–140)
GLUCOSE SERPL-MCNC: 327 MG/DL (ref 65–140)
HCT VFR BLD AUTO: 36.5 % (ref 36.5–49.3)
HGB BLD-MCNC: 11.8 G/DL (ref 12–17)
IMM GRANULOCYTES # BLD AUTO: 0.05 THOUSAND/UL (ref 0–0.2)
IMM GRANULOCYTES NFR BLD AUTO: 1 % (ref 0–2)
LYMPHOCYTES # BLD AUTO: 1.48 THOUSANDS/ΜL (ref 0.6–4.47)
LYMPHOCYTES NFR BLD AUTO: 15 % (ref 14–44)
MCH RBC QN AUTO: 28.3 PG (ref 26.8–34.3)
MCHC RBC AUTO-ENTMCNC: 32.3 G/DL (ref 31.4–37.4)
MCV RBC AUTO: 88 FL (ref 82–98)
MONOCYTES # BLD AUTO: 0.8 THOUSAND/ΜL (ref 0.17–1.22)
MONOCYTES NFR BLD AUTO: 8 % (ref 4–12)
NEUTROPHILS # BLD AUTO: 7.21 THOUSANDS/ΜL (ref 1.85–7.62)
NEUTS SEG NFR BLD AUTO: 74 % (ref 43–75)
NRBC BLD AUTO-RTO: 0 /100 WBCS
PLATELET # BLD AUTO: 164 THOUSANDS/UL (ref 149–390)
PMV BLD AUTO: 10.8 FL (ref 8.9–12.7)
POTASSIUM SERPL-SCNC: 4.5 MMOL/L (ref 3.5–5.3)
PROT SERPL-MCNC: 6.9 G/DL (ref 6.4–8.2)
RBC # BLD AUTO: 4.17 MILLION/UL (ref 3.88–5.62)
SODIUM SERPL-SCNC: 139 MMOL/L (ref 136–145)
WBC # BLD AUTO: 9.71 THOUSAND/UL (ref 4.31–10.16)

## 2019-02-14 PROCEDURE — 85025 COMPLETE CBC W/AUTO DIFF WBC: CPT | Performed by: EMERGENCY MEDICINE

## 2019-02-14 PROCEDURE — 87205 SMEAR GRAM STAIN: CPT | Performed by: EMERGENCY MEDICINE

## 2019-02-14 PROCEDURE — 87186 SC STD MICRODIL/AGAR DIL: CPT | Performed by: EMERGENCY MEDICINE

## 2019-02-14 PROCEDURE — 36415 COLL VENOUS BLD VENIPUNCTURE: CPT | Performed by: EMERGENCY MEDICINE

## 2019-02-14 PROCEDURE — 87077 CULTURE AEROBIC IDENTIFY: CPT | Performed by: EMERGENCY MEDICINE

## 2019-02-14 PROCEDURE — 82948 REAGENT STRIP/BLOOD GLUCOSE: CPT

## 2019-02-14 PROCEDURE — 80053 COMPREHEN METABOLIC PANEL: CPT | Performed by: EMERGENCY MEDICINE

## 2019-02-14 PROCEDURE — 99284 EMERGENCY DEPT VISIT MOD MDM: CPT

## 2019-02-14 PROCEDURE — 99223 1ST HOSP IP/OBS HIGH 75: CPT | Performed by: INTERNAL MEDICINE

## 2019-02-14 PROCEDURE — 73630 X-RAY EXAM OF FOOT: CPT

## 2019-02-14 PROCEDURE — 87070 CULTURE OTHR SPECIMN AEROBIC: CPT | Performed by: EMERGENCY MEDICINE

## 2019-02-14 PROCEDURE — 94664 DEMO&/EVAL PT USE INHALER: CPT

## 2019-02-14 PROCEDURE — 96374 THER/PROPH/DIAG INJ IV PUSH: CPT

## 2019-02-14 RX ORDER — FENTANYL CITRATE 50 UG/ML
50 INJECTION, SOLUTION INTRAMUSCULAR; INTRAVENOUS ONCE
Status: COMPLETED | OUTPATIENT
Start: 2019-02-14 | End: 2019-02-14

## 2019-02-14 RX ORDER — ESCITALOPRAM OXALATE 10 MG/1
5 TABLET ORAL DAILY
Status: DISCONTINUED | OUTPATIENT
Start: 2019-02-15 | End: 2019-02-16 | Stop reason: HOSPADM

## 2019-02-14 RX ORDER — HEPARIN SODIUM 5000 [USP'U]/ML
5000 INJECTION, SOLUTION INTRAVENOUS; SUBCUTANEOUS EVERY 8 HOURS SCHEDULED
Status: DISCONTINUED | OUTPATIENT
Start: 2019-02-14 | End: 2019-02-16 | Stop reason: HOSPADM

## 2019-02-14 RX ORDER — ASPIRIN 81 MG/1
81 TABLET ORAL DAILY
Status: DISCONTINUED | OUTPATIENT
Start: 2019-02-15 | End: 2019-02-16 | Stop reason: HOSPADM

## 2019-02-14 RX ORDER — FAMOTIDINE 20 MG/1
20 TABLET, FILM COATED ORAL DAILY
Status: DISCONTINUED | OUTPATIENT
Start: 2019-02-15 | End: 2019-02-16 | Stop reason: HOSPADM

## 2019-02-14 RX ORDER — ATORVASTATIN CALCIUM 40 MG/1
80 TABLET, FILM COATED ORAL
Status: DISCONTINUED | OUTPATIENT
Start: 2019-02-14 | End: 2019-02-16 | Stop reason: HOSPADM

## 2019-02-14 RX ORDER — CARVEDILOL 6.25 MG/1
6.25 TABLET ORAL 2 TIMES DAILY WITH MEALS
Status: DISCONTINUED | OUTPATIENT
Start: 2019-02-14 | End: 2019-02-16 | Stop reason: HOSPADM

## 2019-02-14 RX ORDER — GABAPENTIN 250 MG/5ML
600 SOLUTION ORAL
Status: DISCONTINUED | OUTPATIENT
Start: 2019-02-14 | End: 2019-02-16 | Stop reason: HOSPADM

## 2019-02-14 RX ORDER — HYDROMORPHONE HCL/PF 1 MG/ML
0.2 SYRINGE (ML) INJECTION EVERY 4 HOURS PRN
Status: DISCONTINUED | OUTPATIENT
Start: 2019-02-14 | End: 2019-02-16 | Stop reason: HOSPADM

## 2019-02-14 RX ORDER — ONDANSETRON 4 MG/1
4 TABLET, ORALLY DISINTEGRATING ORAL EVERY 6 HOURS PRN
Status: DISCONTINUED | OUTPATIENT
Start: 2019-02-14 | End: 2019-02-16 | Stop reason: HOSPADM

## 2019-02-14 RX ORDER — INSULIN GLARGINE 100 [IU]/ML
25 INJECTION, SOLUTION SUBCUTANEOUS DAILY
Status: DISCONTINUED | OUTPATIENT
Start: 2019-02-15 | End: 2019-02-16 | Stop reason: HOSPADM

## 2019-02-14 RX ORDER — ERGOCALCIFEROL 1.25 MG/1
50000 CAPSULE ORAL WEEKLY
Status: DISCONTINUED | OUTPATIENT
Start: 2019-02-17 | End: 2019-02-16 | Stop reason: HOSPADM

## 2019-02-14 RX ORDER — AMLODIPINE BESYLATE 5 MG/1
5 TABLET ORAL EVERY 12 HOURS
Status: DISCONTINUED | OUTPATIENT
Start: 2019-02-14 | End: 2019-02-16 | Stop reason: HOSPADM

## 2019-02-14 RX ORDER — ACETAMINOPHEN 325 MG/1
975 TABLET ORAL EVERY 6 HOURS
Status: DISCONTINUED | OUTPATIENT
Start: 2019-02-14 | End: 2019-02-16 | Stop reason: HOSPADM

## 2019-02-14 RX ORDER — CHOLECALCIFEROL (VITAMIN D3) 125 MCG
1000 CAPSULE ORAL DAILY
Status: DISCONTINUED | OUTPATIENT
Start: 2019-02-15 | End: 2019-02-16 | Stop reason: HOSPADM

## 2019-02-14 RX ORDER — LACTULOSE 20 G/30ML
20 SOLUTION ORAL DAILY PRN
Status: DISCONTINUED | OUTPATIENT
Start: 2019-02-14 | End: 2019-02-16 | Stop reason: HOSPADM

## 2019-02-14 RX ADMIN — CEFEPIME 2000 MG: 2 INJECTION, POWDER, FOR SOLUTION INTRAVENOUS at 16:14

## 2019-02-14 RX ADMIN — AMLODIPINE BESYLATE 5 MG: 5 TABLET ORAL at 18:41

## 2019-02-14 RX ADMIN — GABAPENTIN 600 MG: 250 SOLUTION ORAL at 21:14

## 2019-02-14 RX ADMIN — PIPERACILLIN SODIUM,TAZOBACTAM SODIUM 2.25 G: 2; .25 INJECTION, POWDER, FOR SOLUTION INTRAVENOUS at 19:41

## 2019-02-14 RX ADMIN — NEOMYCIN SULFATE, POLYMYXIN B SULFATE, AND PRAMOXINE HYDROCHLORIDE 1 APPLICATION: 3.5; 10000; 1 CREAM TOPICAL at 18:31

## 2019-02-14 RX ADMIN — HEPARIN SODIUM 5000 UNITS: 5000 INJECTION INTRAVENOUS; SUBCUTANEOUS at 18:27

## 2019-02-14 RX ADMIN — INSULIN LISPRO 8 UNITS: 100 INJECTION, SOLUTION INTRAVENOUS; SUBCUTANEOUS at 18:43

## 2019-02-14 RX ADMIN — VANCOMYCIN HYDROCHLORIDE 1750 MG: 1 INJECTION, POWDER, LYOPHILIZED, FOR SOLUTION INTRAVENOUS at 16:32

## 2019-02-14 RX ADMIN — HEPARIN SODIUM 5000 UNITS: 5000 INJECTION INTRAVENOUS; SUBCUTANEOUS at 21:13

## 2019-02-14 RX ADMIN — INSULIN LISPRO 2 UNITS: 100 INJECTION, SOLUTION INTRAVENOUS; SUBCUTANEOUS at 18:44

## 2019-02-14 RX ADMIN — ATORVASTATIN CALCIUM 80 MG: 40 TABLET, FILM COATED ORAL at 18:41

## 2019-02-14 RX ADMIN — SODIUM CHLORIDE 1000 ML: 0.9 INJECTION, SOLUTION INTRAVENOUS at 16:38

## 2019-02-14 RX ADMIN — ACETAMINOPHEN 975 MG: 325 TABLET, FILM COATED ORAL at 21:13

## 2019-02-14 RX ADMIN — FENTANYL CITRATE 50 MCG: 50 INJECTION, SOLUTION INTRAMUSCULAR; INTRAVENOUS at 15:47

## 2019-02-14 RX ADMIN — CARVEDILOL 6.25 MG: 6.25 TABLET, FILM COATED ORAL at 18:41

## 2019-02-14 NOTE — PROGRESS NOTES
Occupational Therapy Fit to Drive Evaluation:  Today's Date: 2019  Patient Name: Dennis Singh  : 1960  MRN: [id number]  Referring Provider: Malathi Skelton MD  Dx: No primary diagnosis found  Active Problem List:   Patient Active Problem List   Diagnosis    Type 2 diabetes mellitus with hyperglycemia, with long-term current use of insulin (HCC)    Mixed hyperlipidemia    Benign hypertension with CKD (chronic [facility] disease) stage III ([facility] )    Cerebrovascular accident (CVA) due to thrombosis of left middle cerebral artery (HCC)    Cognitive impairment    Elevated alkaline phosphatase level    CKD (chronic kidney disease)    Diabetic macular edema (HCC)    GERD (gastroesophageal reflux disease)    Cirrhosis ([facility] )    Diabetic polyneuropathy associated with type 2 diabetes mellitus (HCC)    Other constipation    Abnormal EEG    History of stroke    TIA (transient ischemic attack)    Stroke-like symptoms, with right-sided weakness    Hypoglycemia, transient episode     Past Medical Hx:   Past Medical History:   Diagnosis Date    Diabetes mellitus ([facility] )     Hypercholesteremia     Hyperlipidemia     Hypertension     Neuropathy     Obesity     Osteomyelitis ([facility] )     last assessed 16    PVC's (premature ventricular contractions)     sees cardiology Dr Sid camargo    [facility])     [facility]     Past Surgical Hx:   Past Surgical History:   Procedure Laterality Date    ABDOMINAL SURGERY      CHOLECYSTECTOMY      Percutaneous    OTHER SURGICAL HISTORY      "stimulator to control bowel movements"    [place] ESOPHAGOGASTRODUODENOSCOPY TRANSORAL DIAGNOSTIC N/A 2016    Procedure: ESOPHAGOGASTRODUODENOSCOPY (EGD); Surgeon: Chidi Nath MD;  [facility];   Service: Gastroenterology    [place] LAP,CHOLECYSTECTOMY N/A 2016    Procedure: LAPAROSCOPIC CHOLECYSTECTOMY ;  Surgeon: Emerald Calderon DO;  [facility]; Service: General    ROTATOR CUFF REPAIR Right     TOE AMPUTATION Right 10/28/2016    Procedure: 3RD TOE AMPUTATION ;  Surgeon: Darrell Barraza DPM;  [facility];  Service:         Pain Levels:   Resting:  {gen number 9-65:312358}    With Activity:  {gen number 8-11:705140}    OT low complexity eval: ***  OT DCAT, Reaction Times Trials, OPTEC Vision Screen, and LACLS: ***    Subjective/Patient Goal: "***"    History of Present Illness:  Pt is a pleasant, active, employed full time 62 y  o  male seen for OT eval s/p referred to [facility] s/p adm to [facility] 7/29/2018-8/2/2018 s/p R facial droop, stroke alert called, administered TPA in ED, managed in ICU, no ICH noted, MRIB + Recent ischemia, without hemorrhage in the left posterior perforating substance affecting hypothalamus, anterolateral thalamus, and genu of the left internal capsule, f/u CTB: Expected evolution of the known ischemic infarct in the left basal ganglion since prior CT   No hemorrhagic conversion   No new ischemic infarcts are seen   No new intraparenchymal hemorrhages are seen   No hydrocephalus, adm to [facility] 8/2/2018-8/13/2018, now referred for outpt OT/PT, dx'd w/ L BG CVA, CKD, diabetic macular edema, elevated alkaline phosphatase level, cognitive impairment, neuropathy, HTN, HLD, comorbidities as listed above       Pt seen for OT re-evaluation/progress note/status update on 2* 10/15/208 2* admitted to [address] [place] [address] 9/26-9/27 diagnosed w/ HTN, HLD, cognitive impairment and CKD III       Pt seen for additional OT re-evaluation/progress note/status update 11/5/2019 2* adm to [facility] from 10/28-10/29/2018 w/ R sided weakness, stroke like s/s, ultimately dx'd w/ r/o TIA/CVA, DMII, CKDIII, prior CVA, HLD, diabetic polyneuropathy, MRIB (-) No acute intracranial abnormality   No restricted diffusion to suggest acute ischemia  Old anterior left thalamic/hypothalamic lacunar infarct   Old left pontine lacunar infarct      Most recently seeing outpt OT/PT/SLP @ [place]  Pt continues to request to return to driving, now referred for FTD      Lifestyle Performance Model:  Autonomy: Pt was I w/  I/ADLS, drove, & required no use of DME PTA, since CVA was min A for ADLS, A for IADLS, has not returned to driving and required use of RW,   Reciprocal Relationships: Supportive wife and 4 children living 16, 22, 32, and 30  Service to Others: Pt is retired , also worked for [employer]  Intrinsic Gratification: Enjoys nothing outside of working going out to eat, going shopping, Temple on Sunday  Home Setup: Pt lives in a 1 story handicapped accessible apartment w/ first floor [place] [facility] [place] [facility] [address]     Driving History:  Vehicle Type:   ***Car,     ***Truck,     ***Motorcycle  Visual History:   ***Glasses,     ***Contacts   ***Cataracts,     ***Glaucoma,     ***[place],     ***Robinson Albarran Dr  Appointment ***  Communication Status:   ***English,     ***Czech  Driving History:   ***GPS use,     ***History of getting lost,    ***Tickets,     ***DUI  License Status:   ***Active,     ***Inactive  Last Drove:   ***  Last Accident:   ***  Initial License Date:   ***  Driving Goals:   ***Local     ***Highway  Car Transfer:   ***    Objective  Functional/Cognitive Impairments:  1  DCAT: (see attached report for further details) Pt scoring an ***% likelihood on failing on road    *** Fit,     ***Unfit  Recommend On Road Assessment:   ***Yes,     ***No  Recommend to [employer] [place] [employer] [place]   ***Yes,     ***No,     Due to: ***  2   OPTEC vision screen: (see attached)   Contrast sensitivity: ***   Far acuity: 20/***    Near acuity: 20/***   Color perception: ***   Lateral phoria: ***   Depth perception far: ***   Sign recognition: able to ID ***/12 road signs correctly   Color recognition: ***  3   Reaction time trials: see attached   trial 1) ***, trial 2) ***,  trial 3) ***, average of 3 trials: ***, Falling within the 25th %ile for reaction time  4   Rapid Pace Walk Test:  *** seconds: Those with greater than 9 seconds had a 3 fold increase risk of being in an at fault auto accident  5   Physical findings:  cervical rotation R ***, L ***; UE and LE AROM full with *** strength   6  Probability of creating a hazardous situation on specialized on-road test: ***%; see attached report for further details  7   Recommendations:  ***    Assessment/Plan  Occupational Therapy Skilled Analysis Assessment and Plan of Care:  Pt is a 62 y o  male referred to Occupational Therapy for Fitness to Drive Evaluation to assess pts cognitive, visual, and motor abilities to drive safely in community environment  Pt requires overall mod I for ADLs/self care and mod I for fx'l mobility w/ *** DME  Pt is currently demonstrating the following occupational deficits: limited 2* ***   ***  Based on the aforementioned OT evaluation, functional performance deficits, and assessments, pt has been identified as a low complexity evaluation  No further skillable OT needs indicated as pt referred for Fitness to Drive Evaluation only per consult script, D/C from OT caseload, D/C OT  MD to discuss results w/ pt  INTERVENTION COMMENTS:  Diagnosis: No primary diagnosis found  Precautions: fall risk, LE wounds, CVA, impulsive, diplopia  FOTO: N/A for FTD Evaluations  Insurance: [employer] [[id number]*    Thank you for the consult!   Please call if you have any questions: [phone]  TRISTAN Yan, OTR/L, C-BHARATI, CSRS  Director of Outpatient Neuro Occupational Therapy

## 2019-02-14 NOTE — ASSESSMENT & PLAN NOTE
Lab Results   Component Value Date    HGBA1C 7 5 (H) 01/16/2019       No results for input(s): POCGLU in the last 72 hours      Blood Sugar Average: Last 72 hrs:  continue lantus and novolog  SSI

## 2019-02-14 NOTE — ASSESSMENT & PLAN NOTE
Associated with foul smelling drainage  Wound culture  IV zosyn  Podiatry consult  XR foot - no osteomyelitis  F/u cultures

## 2019-02-14 NOTE — ED PROVIDER NOTES
History  Chief Complaint   Patient presents with    Toe Injury     patient reports having broken toes over the past 3 weeks  now c/o having increased swelling started today with worse pain and leg swelling and pain  Patient is a 51-year-old male with a history of diabetes who presents with left foot pain and swelling  Patient states he presented to the emergency department about 3 weeks ago for an injury to his left 5th toe  He was diagnosed with a proximal phalanx fracture and has been wearing a postop shoe  He followed up with Podiatry since discharge from emergency department and was told it would take several weeks to heal   However today he had acute worsening pain in his 5th toe and foot  He and his wife admit to increased swelling in the foot as well as a foul-smelling discharge from his toe  His wife states that he appears to have a wound between his 4th and 5th digits which is actively draining  She is concerned because he had prior amputation of toe on his right foot  History provided by:  Patient and spouse  Toe Pain   Location:  5th digit, left foot  Severity:  Severe  Duration:  1 day  Timing:  Constant  Progression:  Worsening  Chronicity:  New  Ineffective treatments:  None tried  Associated symptoms: no abdominal pain, no chest pain, no cough, no diarrhea, no fatigue, no fever, no nausea, no rash, no shortness of breath, no sore throat and no vomiting        Prior to Admission Medications   Prescriptions Last Dose Informant Patient Reported? Taking? Blood Glucose Monitoring Suppl (ONE TOUCH ULTRA MINI) w/Device KIT  Spouse/Significant Other No Yes   Sig: by Does not apply route 3 (three) times a day   Blood Pressure Monitoring (BLOOD PRESSURE CUFF) MISC  Spouse/Significant Other No Yes   Sig: Use to check blood pressure before taking blood pressure medication and 1 hour after and follow instructions provided in discharge instructions based on the readings     [employer] GLUCOSE 4-6 GM-MG Spouse/Significant Other Yes Yes   Sig: CHEW 3 TABLETS BY MOUTH DAILY AS NEEDED   Incontinence Supplies (MALE URINAL) MISC  Spouse/Significant Other No Yes   Sig: by Does not apply route daily   Insulin Syringe-Needle U-100 (B-D INS SYR ULTRAFINE  3CC/30G) 30G X 1/2" 0 3 ML MISC  Spouse/Significant Other No Yes   Sig: by Does not apply route 4 (four) times a day   Insulin Syringe-Needle U-100 (B-D INS SYRINGE 0 5CC/30GX1/2") 30G X 1/2" 0 5 ML MISC   No Yes   Sig: Inject under the skin 4 (four) times a day   LUCENTIS 0 3 MG/0 05ML  Spouse/Significant Other Yes Yes   ONETOUCH DELICA LANCETS 21F MISC  Spouse/Significant Other No Yes   Sig: by Does not apply route 3 (three) times a day   amLODIPine (NORVASC) 5 mg tablet  Spouse/Significant Other No Yes   Sig: Take 1 tablet (5 mg total) by mouth every 12 (twelve) hours   aspirin (ECOTRIN LOW STRENGTH) 81 mg EC tablet  Spouse/Significant Other Yes Yes   Sig: Take 81 mg by mouth daily Resume on 8/14   atorvastatin (LIPITOR) 80 mg tablet  Spouse/Significant Other No Yes   Sig: Take 1 tablet (80 mg total) by mouth daily with dinner   carvedilol (COREG) 6 25 mg tablet  Spouse/Significant Other No Yes   Sig: Take 1 tablet (6 25 mg total) by mouth 2 (two) times a day with meals   cholecalciferol 92183 units TABS  Spouse/Significant Other No Yes   Sig: Take 1 tablet (50,000 Units total) by mouth once a week   cyanocobalamin 1000 MCG tablet  Spouse/Significant Other No Yes   Sig: Take 1 tablet (1,000 mcg total) by mouth daily   diclofenac sodium (VOLTAREN) 1 %   No Yes   Sig: Apply 2 g topically 3 (three) times a day   escitalopram (LEXAPRO) 5 mg tablet  Spouse/Significant Other No Yes   Sig: Take 1 tablet (5 mg total) by mouth daily   famotidine (PEPCID) 20 mg tablet  Spouse/Significant Other Yes Yes   Sig: Take 20 mg by mouth daily Resume on 8/14   gabapentin (NEURONTIN) 250 mg/5 mL solution  Spouse/Significant Other No Yes   Sig: Take 12 mL (600 mg total) by mouth daily at bedtime   glucose 4 g chewable tablet  Spouse/Significant Other No Yes   Sig: Chew 3 tablets (12 g total) as needed for low blood sugar   glucose blood (ONE TOUCH ULTRA TEST) test strip   No Yes   Si each by Other route 3 (three) times a day Use as instructed   insulin aspart (NovoLOG) 100 units/mL injection   No Yes   Sig: Inject 8 Units under the skin 3 (three) times a day before meals plus scale  Scale - 150-200 -2 units, 201-250-4 units, 251-300 -6 units, 301-350-8 units, > 350- 10 units   Patient taking differently: Inject 12 Units under the skin 3 (three) times a day before meals plus scale  Scale - 150-200 -2 units, 201-250-4 units, 251-300 -6 units, 301-350-8 units, > 350- 10 units    insulin glargine (LANTUS) 100 units/mL subcutaneous injection  Spouse/Significant Other No Yes   Sig: Inject 25 Units under the skin daily   Patient taking differently: Inject 30 Units under the skin daily     lactulose 20 g/30 mL  Spouse/Significant Other No Yes   Sig: Take 30 mL (20 g total) by mouth daily as needed (constipation)   neomycin-bacitracin-polymyxin b (NEOSPORIN) ointment  Spouse/Significant Other Yes Yes   Sig: Apply 1 application topically 2 (two) times a day Apply twice per day to shin wound until fully healed  If not fully healed in 5 days contact your family doctor   Next application is the evening of    ondansetron (ZOFRAN-ODT) 4 mg disintegrating tablet  Spouse/Significant Other No Yes   Sig: Take 1 tablet (4 mg total) by mouth every 6 (six) hours as needed for nausea or vomiting      Facility-Administered Medications: None       Past Medical History:   Diagnosis Date    Diabetes mellitus ([facility] )     Hypercholesteremia     Hyperlipidemia     Hypertension     Neuropathy     Obesity     Osteomyelitis ([facility] )     last assessed 16    PVC's (premature ventricular contractions)     sees cardiology Dr Meliza camargo    Stroke [facility])     last weeof 2018 [address] [place] [address]       Past Surgical History:   Procedure Laterality Date    ABDOMINAL SURGERY      CHOLECYSTECTOMY      Percutaneous    OTHER SURGICAL HISTORY      "stimulator to control bowel movements"    [place] ESOPHAGOGASTRODUODENOSCOPY TRANSORAL DIAGNOSTIC N/A 9/27/2016    Procedure: ESOPHAGOGASTRODUODENOSCOPY (EGD); Surgeon: Clary Bains MD;  [facility]; Service: Gastroenterology    [place] LAP,CHOLECYSTECTOMY N/A 2/29/2016    Procedure: LAPAROSCOPIC CHOLECYSTECTOMY ;  Surgeon: Lindsey Campbell DO;  [facility];  Service: General    ROTATOR CUFF REPAIR Right     TOE AMPUTATION Right 10/28/2016    Procedure: 3RD TOE AMPUTATION ;  Surgeon: Shanda Rubin DPM;  [facility];  Service:        Family History   Problem Relation Age of Onset    Leukemia Mother     Liver disease Mother     Lung cancer Mother         heavy smoker - 3 ppd    Heart disease Father     Liver disease Father     Multiple myeloma Sister     Breast cancer Sister     Urolithiasis Family     Alcohol abuse Neg Hx     Depression Neg Hx     Drug abuse Neg Hx     Substance Abuse Neg Hx     Mental illness Neg Hx      I have reviewed and agree with the history as documented  Social History     Tobacco Use    Smoking status: Never Smoker    Smokeless tobacco: Never Used   Substance Use Topics    Alcohol use: No    Drug use: No        Review of Systems   Constitutional: Negative for chills, fatigue and fever  HENT: Negative for nosebleeds, sore throat and trouble swallowing  Eyes: Negative for pain and visual disturbance  Respiratory: Negative for cough, chest tightness and shortness of breath  Cardiovascular: Negative for chest pain, palpitations and leg swelling  Gastrointestinal: Negative for abdominal pain, constipation, diarrhea, nausea and vomiting  Endocrine: Negative for polydipsia and polyuria  Genitourinary: Negative for difficulty urinating, dysuria and hematuria     Musculoskeletal: Positive for arthralgias and gait problem  Negative for back pain, neck pain and neck stiffness  Skin: Negative for pallor and rash  Neurological: Negative for dizziness and light-headedness  Physical Exam  Physical Exam   Constitutional: He is oriented to person, place, and time  He appears well-developed and well-nourished  HENT:   Head: Atraumatic  Eyes: Pupils are equal, round, and reactive to light  EOM are normal    Neck: Normal range of motion  Neck supple  Cardiovascular: Normal rate, regular rhythm, normal heart sounds, intact distal pulses and normal pulses  Pulmonary/Chest: Effort normal and breath sounds normal  No respiratory distress  Abdominal: Soft  He exhibits no distension  There is no tenderness  There is no rigidity, no rebound and no guarding  Musculoskeletal: Normal range of motion  He exhibits no edema or tenderness  Left foot: There is swelling (Pitting edema in left foot and lower leg )  Feet:    Neurological: He is alert and oriented to person, place, and time  He has normal strength  No cranial nerve deficit or sensory deficit  Skin: Skin is warm and dry  Psychiatric: He has a normal mood and affect         Vital Signs  ED Triage Vitals [02/14/19 1505]   Temperature Pulse Respirations Blood Pressure SpO2   98 2 °F (36 8 °C) 86 18 (!) 172/83 98 %      Temp Source Heart Rate Source Patient Position - Orthostatic VS BP Location FiO2 (%)   Oral Monitor Sitting Left arm --      Pain Score       9           Vitals:    02/15/19 1521 02/15/19 2317 02/16/19 0441 02/16/19 0807   BP: 139/65 164/79 (!) 178/79 131/74   Pulse: 72 78 72 78   Patient Position - Orthostatic VS: Lying Sitting Sitting Sitting       Visual Acuity      ED Medications  Medications   amLODIPine (NORVASC) tablet 5 mg (5 mg Oral Given 2/16/19 0506)   aspirin (ECOTRIN LOW STRENGTH) EC tablet 81 mg (81 mg Oral Given 2/16/19 0817)   atorvastatin (LIPITOR) tablet 80 mg (80 mg Oral Given 2/15/19 1622) carvedilol (COREG) tablet 6 25 mg (6 25 mg Oral Given 2/16/19 0817)   ergocalciferol (VITAMIN D2) capsule 50,000 Units (has no administration in time range)   cyanocobalamin (VITAMIN B-12) tablet 1,000 mcg (1,000 mcg Oral Given 2/16/19 0817)   escitalopram (LEXAPRO) tablet 5 mg (5 mg Oral Given 2/16/19 0817)   famotidine (PEPCID) tablet 20 mg (20 mg Oral Given 2/16/19 0817)   gabapentin (NEURONTIN) oral solution 600 mg (600 mg Oral Given 2/15/19 2313)   insulin lispro (HumaLOG) 100 units/mL subcutaneous injection 8 Units (8 Units Subcutaneous Given 2/16/19 1340)   insulin glargine (LANTUS) subcutaneous injection 25 Units 0 25 mL (25 Units Subcutaneous Given 2/16/19 0817)   neomycin-polymyxin-pramoxine (ANTIBIOTIC CREAM PLUS PAIN RELIEF) cream 1 application (1 application Topical Given 2/16/19 0953)   glucose chewable tablet 12 g (has no administration in time range)   lactulose 20 g/30 mL oral solution 20 g (has no administration in time range)   ondansetron (ZOFRAN-ODT) dispersible tablet 4 mg (has no administration in time range)   heparin (porcine) subcutaneous injection 5,000 Units (5,000 Units Subcutaneous Given 2/16/19 0506)   insulin lispro (HumaLOG) 100 units/mL subcutaneous injection 1-5 Units (1 Units Subcutaneous Given 2/16/19 1339)   acetaminophen (TYLENOL) tablet 975 mg (975 mg Oral Given 2/16/19 0507)   HYDROmorphone (DILAUDID) injection 0 2 mg (0 2 mg Intravenous Given 2/15/19 2026)   traMADol (ULTRAM) tablet 25 mg (25 mg Oral Given 2/15/19 1623)   metroNIDAZOLE (FLAGYL) tablet 500 mg (500 mg Oral Given 2/16/19 0506)   fentanyl citrate (PF) 100 MCG/2ML 50 mcg (50 mcg Intravenous Given 2/14/19 1547)   vancomycin (VANCOCIN) 1,750 mg in sodium chloride 0 9 % 500 mL IVPB (1,750 mg Intravenous New Bag 2/14/19 1632)   cefepime (MAXIPIME) 2 g/50 mL dextrose IVPB (0 mg Intravenous Stopped 2/14/19 1627)   sodium chloride 0 9 % bolus 1,000 mL (1,000 mL Intravenous New Bag 2/14/19 1638)       Diagnostic Studies  Results Reviewed     Procedure Component Value Units Date/Time    Wound culture and Gram stain [[id number]]  (Abnormal)  (Susceptibility) Collected:  02/14/19 1549    Lab Status:  Preliminary result Specimen:  Wound from Foot, Left Updated:  02/16/19 0903     Wound Culture 1+ Growth of Escherichia coli      1+ Growth of Non lactose fermenting gram negative katelyn      3+ Growth of      Gram Stain Result No polys seen      4+ Gram Positive Rods Resembling Diphtheroids      3+ Gram positive cocci in pairs      1+ Gram positive cocci in clusters    Susceptibility     Escherichia coli (1)     Antibiotic Interpretation Microscan Method Status    Amoxicillin + Clavulanate Resistant >16/8 ug/ml REEMA Preliminary    Ampicillin ($$) Resistant >16 00 ug/ml REEMA Preliminary    Ampicillin + Sulbactam ($) Resistant >16/8 ug/ml REEMA Preliminary    Aztreonam ($$$)  Susceptible <4 ug/ml REEMA Preliminary    Cefazolin ($) Resistant >16 00 ug/ml REEMA Preliminary    Cefepime ($) Susceptible <2 00 ug/ml REEMA Preliminary    Cefotaxime ($) Susceptible 4 00 ug/ml REEMA Preliminary    Ceftazidime ($$) Susceptible <1 ug/ml REEMA Preliminary    Ceftriaxone ($$) Resistant 4 00 ug/ml REEMA Preliminary    Cefuroxime ($$) Resistant >16 ug/ml REEMA Preliminary    Ciprofloxacin ($)  Susceptible <1 00 ug/ml REEMA Preliminary    Ertapenem ($$$) Susceptible <0 5 ug/ml REEMA Preliminary    Gentamicin ($$) Susceptible <1 ug/ml REEMA Preliminary    Levofloxacin ($) Susceptible <0 25 ug/ml REEMA Preliminary    Piperacillin + Tazobactam ($$$) Susceptible <4 ug/ml REEMA Preliminary    Tetracycline Intermediate 8 ug/ml REEMA Preliminary    Tobramycin ($) Susceptible 2 ug/ml REEMA Preliminary    Trimethoprim + Sulfamethoxazole ($$$) Resistant >2/38 ug/ml REEMA Preliminary                   Comprehensive metabolic panel [[id number]]  (Abnormal) Collected:  02/14/19 1549    Lab Status:  Final result Specimen:  Blood from Arm, Right Updated:  02/14/19 1614     Sodium 139 mmol/L Potassium 4 5 mmol/L      Chloride 105 mmol/L      CO2 24 mmol/L      ANION GAP 10 mmol/L      BUN 54 mg/dL      Creatinine 2 84 mg/dL      Glucose 327 mg/dL      Calcium 8 9 mg/dL      AST 14 U/L      ALT 24 U/L      Alkaline Phosphatase 209 U/L      Total Protein 6 9 g/dL      Albumin 2 9 g/dL      Total Bilirubin 0 30 mg/dL      eGFR 23 ml/min/1 73sq m     Narrative:       National Kidney Disease Education Program recommendations are as follows:  GFR calculation is accurate only with a steady state creatinine  Chronic Kidney disease less than 60 ml/min/1 73 sq  meters  Kidney failure less than 15 ml/min/1 73 sq  meters  CBC and differential [[id number]]  (Abnormal) Collected:  02/14/19 1549    Lab Status:  Final result Specimen:  Blood from Arm, Right Updated:  02/14/19 1557     WBC 9 71 Thousand/uL      RBC 4 17 Million/uL      Hemoglobin 11 8 g/dL      Hematocrit 36 5 %      MCV 88 fL      MCH 28 3 pg      MCHC 32 3 g/dL      RDW 13 6 %      MPV 10 8 fL      Platelets 054 Thousands/uL      nRBC 0 /100 WBCs      Neutrophils Relative 74 %      Immat GRANS % 1 %      Lymphocytes Relative 15 %      Monocytes Relative 8 %      Eosinophils Relative 2 %      Basophils Relative 0 %      Neutrophils Absolute 7 21 Thousands/µL      Immature Grans Absolute 0 05 Thousand/uL      Lymphocytes Absolute 1 48 Thousands/µL      Monocytes Absolute 0 80 Thousand/µL      Eosinophils Absolute 0 15 Thousand/µL      Basophils Absolute 0 02 Thousands/µL                  XR foot 3+ views LEFT   ED Interpretation by Kalin De Jesus DO (02/14 1550)   Mildly displaced fracture of the 5th proximal phalanx  He is evidence of atherosclerotic disease  Final Result by Gerald Feldman MD (02/14 1618)      Fracture the proximal phalanx of the left 5th toe, unchanged since 1/28/2019              Workstation performed: [id number]                    Procedures  Procedures       Phone Contacts  ED Phone Contact    ED Course MDM  Number of Diagnoses or Management Options  Diabetic ulcer of toe of left foot associated with type 2 diabetes mellitus, limited to breakdown of skin ([facility] ): new and requires workup  Wound infection: new and requires workup  Diagnosis management comments: Patient with a history of diabetes presents with a wound to his left foot which is actively draining  He appears to have associated cellulitis of the left foot  X-ray is negative for evidence of osteomyelitis  Wound was cultured  Initiated antibiotics for diabetic wound infection  Will hospitalize for continued treatment and possible podiatry consult  Patient does not appear septic  Amount and/or Complexity of Data Reviewed  Clinical lab tests: reviewed and ordered  Tests in the radiology section of CPT®: ordered and reviewed  Tests in the medicine section of CPT®: ordered and reviewed  Review and summarize past medical records: yes  Discuss the patient with other providers: yes  Independent visualization of images, tracings, or specimens: yes    Risk of Complications, Morbidity, and/or Mortality  Presenting problems: high  Diagnostic procedures: moderate  Management options: high    Patient Progress  Patient progress: stable      Disposition  Final diagnoses:   Wound infection   Diabetic ulcer of toe of left foot associated with type 2 diabetes mellitus, limited to breakdown of skin ([facility] )     Time reflects when diagnosis was documented in both MDM as applicable and the Disposition within this note     Time User Action Codes Description Comment    2/14/2019  4:07 PM Ariela Doss Add [T14  8XXA,  L08 9] Wound infection     2/14/2019  4:08 PM Ariela Doss Add [T26 249,  L97 521] Diabetic ulcer of toe of left foot associated with type 2 diabetes mellitus, limited to breakdown of skin [facility])       ED Disposition     ED Disposition Condition Date/Time Comment    Admit Stable u Feb 14, 2019  4:07 PM Case was discussed with Dr Monique Hu and the patient's admission status was agreed to be Admission Status: inpatient status to the service of Dr Monique Hu   Follow-up Information     Follow up With Specialties Details Why Contact Info    Viry Atkinson DPM Podiatry Schedule an appointment as soon as possible for a visit in 1 week(s)  [address]  [facility] [address] [place] [address]  [address], DO Internal Medicine Schedule an appointment as soon as possible for a visit in 1 week(s)  [address]  [id number] [place] [address]      Jessica Bae MD Endocrinology   [address] [address]  [phone]            Discharge Medication List as of 2/16/2019  1:32 PM      START taking these medications    Details   ciprofloxacin (CIPRO) 500 mg tablet Take 1 tablet (500 mg total) by mouth every 12 (twelve) hours for 9 days, Starting Sat 2/16/2019, Until Mon 2/25/2019, Normal      metroNIDAZOLE (FLAGYL) 500 mg tablet Take 1 tablet (500 mg total) by mouth every 8 (eight) hours for 9 days, Starting Sat 2/16/2019, Until Mon 2/25/2019, Normal         CONTINUE these medications which have NOT CHANGED    Details   amLODIPine (NORVASC) 5 mg tablet Take 1 tablet (5 mg total) by mouth every 12 (twelve) hours, Starting Mon 10/15/2018, Normal      aspirin (ECOTRIN LOW STRENGTH) 81 mg EC tablet Take 81 mg by mouth daily Resume on 8/14, Historical Med      atorvastatin (LIPITOR) 80 mg tablet Take 1 tablet (80 mg total) by mouth daily with dinner, Starting Thu 12/20/2018, Normal      Blood Glucose Monitoring Suppl (ONE TOUCH ULTRA MINI) w/Device KIT by Does not apply route 3 (three) times a day, Starting Tue 11/27/2018, Normal      Blood Pressure Monitoring (BLOOD PRESSURE CUFF) MISC Use to check blood pressure before taking blood pressure medication and 1 hour after and follow instructions provided in discharge instructions based on the readings  , Print      carvedilol (COREG) 6 25 mg tablet Take 1 tablet (6 25 mg total) by mouth 2 (two) times a day with meals, Starting Mon 10/15/2018, Normal      cholecalciferol 53845 units TABS Take 1 tablet (50,000 Units total) by mouth once a week, Starting Mon 9/24/2018, Normal      [employer] GLUCOSE 4-6 GM-MG CHEW 3 TABLETS BY MOUTH DAILY AS NEEDED, Historical Med      cyanocobalamin 1000 MCG tablet Take 1 tablet (1,000 mcg total) by mouth daily, Starting Mon 9/24/2018, Normal      diclofenac sodium (VOLTAREN) 1 % Apply 2 g topically 3 (three) times a day, Starting Wed 1/30/2019, Normal      escitalopram (LEXAPRO) 5 mg tablet Take 1 tablet (5 mg total) by mouth daily, Starting Fri 10/26/2018, Normal      famotidine (PEPCID) 20 mg tablet Take 20 mg by mouth daily Resume on 8/14, Historical Med      gabapentin (NEURONTIN) 250 mg/5 mL solution Take 12 mL (600 mg total) by mouth daily at bedtime, Starting Mon 12/17/2018, Normal      glucose 4 g chewable tablet Chew 3 tablets (12 g total) as needed for low blood sugar, Starting Wed 8/15/2018, Normal      glucose blood (ONE TOUCH ULTRA TEST) test strip 1 each by Other route 3 (three) times a day Use as instructed, Starting Tue 1/29/2019, Normal      Incontinence Supplies (MALE URINAL) MISC by Does not apply route daily, Starting Mon 9/24/2018, Print      insulin aspart (NovoLOG) 100 units/mL injection Inject 8 Units under the skin 3 (three) times a day before meals plus scale  Scale - 150-200 -2 units, 201-250-4 units, 251-300 -6 units, 301-350-8 units, > 350- 10 units, Starting Thu 1/31/2019, Print      insulin glargine (LANTUS) 100 units/mL subcutaneous injection Inject 25 Units under the skin daily, Starting Tue 10/30/2018, Print      Insulin Syringe-Needle U-100 (B-D INS SYR ULTRAFINE  3CC/30G) 30G X 1/2" 0 3 ML MISC by Does not apply route 4 (four) times a day, Starting Fri 10/26/2018, Normal      Insulin Syringe-Needle U-100 (B-D INS SYRINGE 0 5CC/30GX1/2") 30G X 1/2" 0 5 ML MISC Inject under the skin 4 (four) times a day, Starting Mon 1/28/2019, Normal      lactulose 20 g/30 mL Take 30 mL (20 g total) by mouth daily as needed (constipation), Starting Fri 8/17/2018, Normal      LUCENTIS 0 3 MG/0 05ML Starting Tue 9/11/2018, Historical Med      neomycin-bacitracin-polymyxin b (NEOSPORIN) ointment Apply 1 application topically 2 (two) times a day Apply twice per day to shin wound until fully healed  If not fully healed in 5 days contact your family doctor   Next application is the evening of 8/13, Historical Med      ondansetron (ZOFRAN-ODT) 4 mg disintegrating tablet Take 1 tablet (4 mg total) by mouth every 6 (six) hours as needed for nausea or vomiting, Starting Sat 10/6/2018, Normal      ONETOUCH DELICA LANCETS 67X MISC by Does not apply route 3 (three) times a day, Starting Tue 11/27/2018, Normal           Outpatient Discharge Orders   Discharge Diet     Activity as tolerated     Call provider for:  persistent nausea or vomiting     Call provider for:  redness, tenderness, or signs of infection (pain, swelling, redness, odor or green/yellow discharge around incision site)       ED Provider  Electronically Signed by           Teri Nation DO  02/16/19 1118

## 2019-02-14 NOTE — H&P
H&P- Shiloh [place] 1960, 62 y o  male MRN: [id number]    Unit/Bed#: -01 Encounter: [id number]    Primary Care Provider: Ronald Pérez DO   Date and time admitted to hospital: 2/14/2019  3:17 PM    Cellulitis  Assessment & Plan  Associated with foul smelling drainage  Wound culture  IV zosyn  Podiatry consult  XR foot - no osteomyelitis  F/u cultures    Cirrhosis ([address] )  Assessment & Plan  Continue lactulose  outpt follow up    CKD (chronic kidney disease)  Assessment & Plan  Cr slightly above baseline - monitor with Abx    Benign hypertension with CKD (chronic kidney disease) stage III (HCC)  Assessment & Plan  Continue amlodipine/ coreg    Type 2 diabetes mellitus with hyperglycemia, with long-term current use of insulin (HCC)  Assessment & Plan  Lab Results   Component Value Date    HGBA1C 7 5 (H) 01/16/2019       No results for input(s): POCGLU in the last 72 hours  Blood Sugar Average: Last 72 hrs:  continue lantus and novolog  SSI    wife updated at bedside  VTE Prophylaxis: Heparin  / sequential compression device   Code Status: Level 1 - Full Code  POLST:     Anticipated Length of Stay:  Patient will be admitted on an Inpatient basis with an anticipated length of stay of  > 2 midnights  Justification for Hospital Stay:     Total Time for Visit, including Counseling / Coordination of Care: 1 hour  Greater than 50% of this total time spent on direct patient counseling and coordination of care  Chief Complaint:   Lt foot pain    of Present Illness:    Shiloh [place] is a 62 y o  male who presents with injury to lt 4th and 5th toes with fractures 3 weeks ago, now presents with redness swelling pain progressing proximal from that area and associated with a foul smelling drainage from the dorsal surface  No fever, chills, presyncope, syncope  All other ROS negative except pain Lt foot  Review of Systems:    Review of Systems   All other systems reviewed and are negative        Past Medical and Surgical History:     Past Medical History:   Diagnosis Date    Diabetes mellitus ([facility] )     Hypercholesteremia     Hyperlipidemia     Hypertension     Neuropathy     Obesity     Osteomyelitis ([facility] )     last assessed 11/4/16    PVC's (premature ventricular contractions)     sees cardiology Dr Elvia camargo    Stroke [facility])     last weeof July 2018 [address] [place] [address]       Past Surgical History:   Procedure Laterality Date    ABDOMINAL SURGERY      CHOLECYSTECTOMY      Percutaneous    OTHER SURGICAL HISTORY      "stimulator to control bowel movements"    NE ESOPHAGOGASTRODUODENOSCOPY TRANSORAL DIAGNOSTIC N/A 9/27/2016    Procedure: ESOPHAGOGASTRODUODENOSCOPY (EGD); Surgeon: Theodora Jacques MD;  [facility]; Service: Gastroenterology    NE LAP,CHOLECYSTECTOMY N/A 2/29/2016    Procedure: LAPAROSCOPIC CHOLECYSTECTOMY ;  Surgeon: Joanie Randle DO;  Location[facility];  Service: General    ROTATOR CUFF REPAIR Right     TOE AMPUTATION Right 10/28/2016    Procedure: 3RD TOE AMPUTATION ;  Surgeon: Juan Couch DPM;  [facility];  Service:        Meds/Allergies:    PTA meds:   Prior to Admission Medications   Prescriptions Last Dose Informant Patient Reported? Taking? Blood Glucose Monitoring Suppl (ONE TOUCH ULTRA MINI) w/Device KIT  Spouse/Significant Other No Yes   Sig: by Does not apply route 3 (three) times a day   Blood Pressure Monitoring (BLOOD PRESSURE CUFF) MISC  Spouse/Significant Other No Yes   Sig: Use to check blood pressure before taking blood pressure medication and 1 hour after and follow instructions provided in discharge instructions based on the readings     [employer] GLUCOSE 4-6 GM-MG  Spouse/Significant Other Yes Yes   Sig: CHEW 3 TABLETS BY MOUTH DAILY AS NEEDED   Incontinence Supplies (MALE URINAL) MISC  Spouse/Significant Other No Yes   Sig: by Does not apply route daily   Insulin Syringe-Needle U-100 (B-D INS SYR ULTRAFINE  3CC/30G) 30G X 1/2" 0 3 ML MISC  Spouse/Significant Other No Yes   Sig: by Does not apply route 4 (four) times a day   Insulin Syringe-Needle U-100 (B-D INS SYRINGE 0 5CC/30GX1/2") 30G X 1/2" 0 5 ML MISC   No Yes   Sig: Inject under the skin 4 (four) times a day   LUCENTIS 0 3 MG/0 05ML  Spouse/Significant Other Yes Yes   ONETOUCH DELICA LANCETS 17I MISC  Spouse/Significant Other No Yes   Sig: by Does not apply route 3 (three) times a day   amLODIPine (NORVASC) 5 mg tablet  Spouse/Significant Other No Yes   Sig: Take 1 tablet (5 mg total) by mouth every 12 (twelve) hours   aspirin (ECOTRIN LOW STRENGTH) 81 mg EC tablet  Spouse/Significant Other Yes Yes   Sig: Take 81 mg by mouth daily Resume on    atorvastatin (LIPITOR) 80 mg tablet  Spouse/Significant Other No Yes   Sig: Take 1 tablet (80 mg total) by mouth daily with dinner   carvedilol (COREG) 6 25 mg tablet  Spouse/Significant Other No Yes   Sig: Take 1 tablet (6 25 mg total) by mouth 2 (two) times a day with meals   cholecalciferol 27606 units TABS  Spouse/Significant Other No Yes   Sig: Take 1 tablet (50,000 Units total) by mouth once a week   cyanocobalamin 1000 MCG tablet  Spouse/Significant Other No Yes   Sig: Take 1 tablet (1,000 mcg total) by mouth daily   diclofenac sodium (VOLTAREN) 1 %   No Yes   Sig: Apply 2 g topically 3 (three) times a day   escitalopram (LEXAPRO) 5 mg tablet  Spouse/Significant Other No Yes   Sig: Take 1 tablet (5 mg total) by mouth daily   famotidine (PEPCID) 20 mg tablet  Spouse/Significant Other Yes Yes   Sig: Take 20 mg by mouth daily Resume on    gabapentin (NEURONTIN) 250 mg/5 mL solution  Spouse/Significant Other No Yes   Sig: Take 12 mL (600 mg total) by mouth daily at bedtime   glucose 4 g chewable tablet  Spouse/Significant Other No Yes   Sig: Chew 3 tablets (12 g total) as needed for low blood sugar   glucose blood (ONE TOUCH ULTRA TEST) test strip   No Yes   Si each by Other route 3 (three) times a day Use as instructed   insulin aspart (NovoLOG) 100 units/mL injection   No Yes   Sig: Inject 8 Units under the skin 3 (three) times a day before meals plus scale  Scale - 150-200 -2 units, 201-250-4 units, 251-300 -6 units, 301-350-8 units, > 350- 10 units   Patient taking differently: Inject 12 Units under the skin 3 (three) times a day before meals plus scale  Scale - 150-200 -2 units, 201-250-4 units, 251-300 -6 units, 301-350-8 units, > 350- 10 units    insulin glargine (LANTUS) 100 units/mL subcutaneous injection  Spouse/Significant Other No Yes   Sig: Inject 25 Units under the skin daily   Patient taking differently: Inject 30 Units under the skin daily     lactulose 20 g/30 mL  Spouse/Significant Other No Yes   Sig: Take 30 mL (20 g total) by mouth daily as needed (constipation)   neomycin-bacitracin-polymyxin b (NEOSPORIN) ointment  Spouse/Significant Other Yes Yes   Sig: Apply 1 application topically 2 (two) times a day Apply twice per day to shin wound until fully healed  If not fully healed in 5 days contact your family doctor   Next application is the evening of 8/13   ondansetron (ZOFRAN-ODT) 4 mg disintegrating tablet  Spouse/Significant Other No Yes   Sig: Take 1 tablet (4 mg total) by mouth every 6 (six) hours as needed for nausea or vomiting      Facility-Administered Medications: None       Allergies: No Known Allergies  History:     Marital Status: /Civil Union   Occupation:   Patient Pre-hospital Living Situation:   Patient Pre-hospital Level of Mobility:   Patient Pre-hospital Diet Restrictions:   Substance Use History:   Social History     Substance and Sexual Activity   Alcohol Use No     Social History     Tobacco Use   Smoking Status Never Smoker   Smokeless Tobacco Never Used     Social History     Substance and Sexual Activity   Drug Use No       Family History:    Family History   Problem Relation Age of Onset    Leukemia Mother     Liver disease Mother     Lung cancer Mother         heavy smoker - 3 ppd    Heart disease Father     Liver disease Father     Multiple myeloma Sister     Breast cancer Sister     Urolithiasis Family     Alcohol abuse Neg Hx     Depression Neg Hx     Drug abuse Neg Hx     Substance Abuse Neg Hx     Mental illness Neg Hx        Physical Exam:     Vitals:   Blood Pressure: (!) 172/83 (02/14/19 1505)  Pulse: 86 (02/14/19 1505)  Temperature: 98 2 °F (36 8 °C) (02/14/19 1505)  Temp Source: Oral (02/14/19 1505)  Respirations: 18 (02/14/19 1505)  Height: 5' 8" (172 7 cm) (02/14/19 1505)  Weight - Scale: 109 kg (240 lb) (02/14/19 1505)  SpO2: 98 % (02/14/19 1505)    Physical Exam   HENT:   Head: Normocephalic  Eyes: Pupils are equal, round, and reactive to light  Cardiovascular: Normal rate and normal heart sounds  Pulmonary/Chest: Effort normal and breath sounds normal    Abdominal: Soft  Bowel sounds are normal  He exhibits distension  Musculoskeletal:   Lt 4th and 5th toes redness swelling and blister on the sole with ulcer in skin fold and dusky drainage         Lab and Imaging Results: I have personally reviewed pertinent reports  and I have personally reviewed pertinent films in PACS    Results from last 7 days   Lab Units 02/14/19  1549   WBC Thousand/uL 9 71   HEMOGLOBIN g/dL 11 8*   HEMATOCRIT % 36 5   PLATELETS Thousands/uL 164   NEUTROS PCT % 74   LYMPHS PCT % 15   MONOS PCT % 8   EOS PCT % 2     Results from last 7 days   Lab Units 02/14/19  1549   POTASSIUM mmol/L 4 5   CHLORIDE mmol/L 105   CO2 mmol/L 24   BUN mg/dL 54*   CREATININE mg/dL 2 84*   CALCIUM mg/dL 8 9   ALK PHOS U/L 209*   ALT U/L 24   AST U/L 14           Xr Spine Cervical 2 Or 3 Vw Injury    Result Date: 2/1/2019  Narrative: CERVICAL SPINE INDICATION:   M54 2: Cervicalgia  COMPARISON:  12/17/2007 VIEWS:  XR SPINE CERVICAL 2 OR 3 VW INJURY FINDINGS: No evidence of fracture  Normal alignment without subluxation  Discogenic degenerative changes are noted, with partial narrowing of the C3-C4 disc space  There is bilateral facet joint arthritis  Atherosclerotic changes are noted involving the neck vasculature The lung apices are clear  Impression: Discogenic degenerative changes at C3-C4 and facet joint osteoarthritis  No acute osseous abnormality Workstation performed: [id number]     Xr Foot 3+ Views Left    Result Date: 2/14/2019  Narrative: LEFT FOOT INDICATION:   Pain  Toe injury  COMPARISON:  January 28, 2019  VIEWS:  XR FOOT 3+ VW LEFT FINDINGS: There is a fracture the proximal phalanx of the left 5th toe, little change since the earlier exam   There is no evidence of new fracture or dislocation  No significant degenerative changes  No lytic or blastic lesions seen  There is extensive arterial calcification  Impression: Fracture the proximal phalanx of the left 5th toe, unchanged since 1/28/2019  Workstation performed: [id number]     Xr Foot 3+ Views Left    Result Date: 1/28/2019  Narrative: LEFT FOOT INDICATION:   diabetic, hit 5th digit off wall, pain and ecchymosis  COMPARISON:  March 30, 2011 VIEWS:  XR FOOT 3+ VW LEFT FINDINGS: Transverse fracture through the proximal metaphysis of the 5th proximal phalanx without significant distraction or angulation  There is approximately 2 mm of displacement medially  No significant degenerative changes  Large plantar calcaneal spur present and small retrocalcaneal spur  No lytic or blastic lesions seen  Soft tissues are remarkable for diffuse calcified atherosclerosis        Impression: Mildly displaced 5th proximal phalanx fracture  Workstation performed: [id number]       EKG, Pathology, and Other Studies Reviewed on Admission:   ·     Allscripts Records Reviewed: Yes     ** Please Note: Dragon 360 Dictation voice to text software may have been used in the creation of this document   **

## 2019-02-14 NOTE — RESPIRATORY THERAPY NOTE
RT Protocol Note  Cris Medina 62 y o  male MRN: [id number]  Unit/Bed#: -01 Encounter: [id number]    Assessment    Active Problems:    Type 2 diabetes mellitus with hyperglycemia, with long-term current use of insulin (HCC)    Mixed hyperlipidemia    Benign hypertension with CKD (chronic kidney disease) stage III (HCC)    CKD (chronic kidney disease)    Cirrhosis (HCC)    Cellulitis      Home Pulmonary Medications:  none       Past Medical History:   Diagnosis Date    [facility]     Hypercholesteremia     Hyperlipidemia     Hypertension     Neuropathy     Obesity     Osteomyelitis ([facility] [address] )     last assessed 11/4/16    PVC's (premature ventricular contractions)     sees cardiology Dr Leonel camargo    Stroke [facility])     last weeof July 2018 [address] [place] [address] History     Socioeconomic History    Marital status: /Civil Union     Spouse name: None    Number of children: None    Years of education: None    Highest education level: None   Occupational History    Occupation: currently working   Social Needs    Financial resource strain: None    Food insecurity:     Worry: None     Inability: None    Transportation needs:     Medical: None     Non-medical: None   Tobacco Use    Smoking status: Never Smoker    Smokeless tobacco: Never Used   Substance and Sexual Activity    Alcohol use: No    Drug use: No    Sexual activity: Not Currently   Lifestyle    Physical activity:     Days per week: None     Minutes per session: None    Stress: None   Relationships    Social connections:     Talks on phone: None     Gets together: None     Attends Restorationism service: None     Active member of club or organization: None     Attends meetings of clubs or organizations: None     Relationship status: None    Intimate partner violence:     Fear of current or ex partner: None     Emotionally abused: None     Physically abused: None     Forced sexual activity: None   Other Topics Concern    None   Social History Narrative    Daily caffeine consumption 2-3 servings a day       Subjective         Objective    Physical Exam:   Assessment Type: Assess only  General Appearance: Alert, Awake  Respiratory Pattern: Normal  Chest Assessment: Chest expansion symmetrical  Bilateral Breath Sounds: Clear    Vitals:  Blood pressure (!) 172/83, pulse 88, temperature 98 2 °F (36 8 °C), temperature source Oral, resp  rate 16, height 5' 8" (1 727 m), weight 109 kg (240 lb), SpO2 98 %            Imaging and other studies: Reviewed          Plan    Respiratory Plan: No distress/Pulmonary history, Discontinue Protocol

## 2019-02-15 ENCOUNTER — APPOINTMENT (OUTPATIENT)
Dept: OCCUPATIONAL THERAPY | Facility: CLINIC | Age: 59
End: 2019-02-15
Payer: COMMERCIAL

## 2019-02-15 LAB
ALBUMIN SERPL BCP-MCNC: 2.6 G/DL (ref 3.5–5)
ALP SERPL-CCNC: 154 U/L (ref 46–116)
ALT SERPL W P-5'-P-CCNC: 22 U/L (ref 12–78)
ANION GAP SERPL CALCULATED.3IONS-SCNC: 11 MMOL/L (ref 4–13)
AST SERPL W P-5'-P-CCNC: 14 U/L (ref 5–45)
BILIRUB SERPL-MCNC: 0.3 MG/DL (ref 0.2–1)
BUN SERPL-MCNC: 47 MG/DL (ref 5–25)
CALCIUM SERPL-MCNC: 8.7 MG/DL (ref 8.3–10.1)
CHLORIDE SERPL-SCNC: 107 MMOL/L (ref 100–108)
CO2 SERPL-SCNC: 21 MMOL/L (ref 21–32)
CREAT SERPL-MCNC: 2.59 MG/DL (ref 0.6–1.3)
ERYTHROCYTE [DISTWIDTH] IN BLOOD BY AUTOMATED COUNT: 13.6 % (ref 11.6–15.1)
GFR SERPL CREATININE-BSD FRML MDRD: 26 ML/MIN/1.73SQ M
GLUCOSE SERPL-MCNC: 152 MG/DL (ref 65–140)
GLUCOSE SERPL-MCNC: 192 MG/DL (ref 65–140)
GLUCOSE SERPL-MCNC: 198 MG/DL (ref 65–140)
GLUCOSE SERPL-MCNC: 199 MG/DL (ref 65–140)
GLUCOSE SERPL-MCNC: 243 MG/DL (ref 65–140)
HCT VFR BLD AUTO: 36.4 % (ref 36.5–49.3)
HGB BLD-MCNC: 11.8 G/DL (ref 12–17)
MCH RBC QN AUTO: 28.2 PG (ref 26.8–34.3)
MCHC RBC AUTO-ENTMCNC: 32.4 G/DL (ref 31.4–37.4)
MCV RBC AUTO: 87 FL (ref 82–98)
PLATELET # BLD AUTO: 167 THOUSANDS/UL (ref 149–390)
PMV BLD AUTO: 11.2 FL (ref 8.9–12.7)
POTASSIUM SERPL-SCNC: 4.5 MMOL/L (ref 3.5–5.3)
PROT SERPL-MCNC: 6.5 G/DL (ref 6.4–8.2)
RBC # BLD AUTO: 4.19 MILLION/UL (ref 3.88–5.62)
SODIUM SERPL-SCNC: 139 MMOL/L (ref 136–145)
WBC # BLD AUTO: 7.59 THOUSAND/UL (ref 4.31–10.16)

## 2019-02-15 PROCEDURE — 80053 COMPREHEN METABOLIC PANEL: CPT | Performed by: INTERNAL MEDICINE

## 2019-02-15 PROCEDURE — 82948 REAGENT STRIP/BLOOD GLUCOSE: CPT

## 2019-02-15 PROCEDURE — 85027 COMPLETE CBC AUTOMATED: CPT | Performed by: INTERNAL MEDICINE

## 2019-02-15 PROCEDURE — 99232 SBSQ HOSP IP/OBS MODERATE 35: CPT | Performed by: PHYSICIAN ASSISTANT

## 2019-02-15 RX ORDER — TRAMADOL HYDROCHLORIDE 50 MG/1
25 TABLET ORAL EVERY 6 HOURS PRN
Status: DISCONTINUED | OUTPATIENT
Start: 2019-02-15 | End: 2019-02-16 | Stop reason: HOSPADM

## 2019-02-15 RX ORDER — METRONIDAZOLE 500 MG/1
500 TABLET ORAL EVERY 8 HOURS SCHEDULED
Status: DISCONTINUED | OUTPATIENT
Start: 2019-02-15 | End: 2019-02-16 | Stop reason: HOSPADM

## 2019-02-15 RX ORDER — CEFAZOLIN SODIUM 2 G/50ML
2000 SOLUTION INTRAVENOUS EVERY 8 HOURS
Status: DISCONTINUED | OUTPATIENT
Start: 2019-02-15 | End: 2019-02-16

## 2019-02-15 RX ADMIN — NEOMYCIN SULFATE, POLYMYXIN B SULFATE, AND PRAMOXINE HYDROCHLORIDE 1 APPLICATION: 3.5; 10000; 1 CREAM TOPICAL at 08:39

## 2019-02-15 RX ADMIN — HEPARIN SODIUM 5000 UNITS: 5000 INJECTION INTRAVENOUS; SUBCUTANEOUS at 06:24

## 2019-02-15 RX ADMIN — INSULIN GLARGINE 25 UNITS: 100 INJECTION, SOLUTION SUBCUTANEOUS at 08:39

## 2019-02-15 RX ADMIN — METRONIDAZOLE 500 MG: 500 TABLET ORAL at 16:22

## 2019-02-15 RX ADMIN — PIPERACILLIN SODIUM,TAZOBACTAM SODIUM 2.25 G: 2; .25 INJECTION, POWDER, FOR SOLUTION INTRAVENOUS at 00:02

## 2019-02-15 RX ADMIN — HEPARIN SODIUM 5000 UNITS: 5000 INJECTION INTRAVENOUS; SUBCUTANEOUS at 14:07

## 2019-02-15 RX ADMIN — INSULIN LISPRO 8 UNITS: 100 INJECTION, SOLUTION INTRAVENOUS; SUBCUTANEOUS at 16:27

## 2019-02-15 RX ADMIN — AMLODIPINE BESYLATE 5 MG: 5 TABLET ORAL at 16:23

## 2019-02-15 RX ADMIN — FAMOTIDINE 20 MG: 20 TABLET ORAL at 08:39

## 2019-02-15 RX ADMIN — ACETAMINOPHEN 975 MG: 325 TABLET, FILM COATED ORAL at 06:21

## 2019-02-15 RX ADMIN — TRAMADOL HYDROCHLORIDE 25 MG: 50 TABLET, FILM COATED ORAL at 16:23

## 2019-02-15 RX ADMIN — ASPIRIN 81 MG: 81 TABLET, COATED ORAL at 08:39

## 2019-02-15 RX ADMIN — ACETAMINOPHEN 975 MG: 325 TABLET, FILM COATED ORAL at 23:17

## 2019-02-15 RX ADMIN — INSULIN LISPRO 1 UNITS: 100 INJECTION, SOLUTION INTRAVENOUS; SUBCUTANEOUS at 11:40

## 2019-02-15 RX ADMIN — CEFAZOLIN SODIUM 2000 MG: 2 SOLUTION INTRAVENOUS at 17:40

## 2019-02-15 RX ADMIN — GABAPENTIN 600 MG: 250 SOLUTION ORAL at 23:13

## 2019-02-15 RX ADMIN — ACETAMINOPHEN 975 MG: 325 TABLET, FILM COATED ORAL at 12:02

## 2019-02-15 RX ADMIN — CEFAZOLIN SODIUM 2000 MG: 2 SOLUTION INTRAVENOUS at 10:10

## 2019-02-15 RX ADMIN — INSULIN LISPRO 2 UNITS: 100 INJECTION, SOLUTION INTRAVENOUS; SUBCUTANEOUS at 08:40

## 2019-02-15 RX ADMIN — INSULIN LISPRO 1 UNITS: 100 INJECTION, SOLUTION INTRAVENOUS; SUBCUTANEOUS at 16:27

## 2019-02-15 RX ADMIN — HYDROMORPHONE HYDROCHLORIDE 0.2 MG: 1 INJECTION, SOLUTION INTRAMUSCULAR; INTRAVENOUS; SUBCUTANEOUS at 20:26

## 2019-02-15 RX ADMIN — INSULIN LISPRO 8 UNITS: 100 INJECTION, SOLUTION INTRAVENOUS; SUBCUTANEOUS at 08:40

## 2019-02-15 RX ADMIN — INSULIN LISPRO 8 UNITS: 100 INJECTION, SOLUTION INTRAVENOUS; SUBCUTANEOUS at 11:40

## 2019-02-15 RX ADMIN — ACETAMINOPHEN 975 MG: 325 TABLET, FILM COATED ORAL at 17:40

## 2019-02-15 RX ADMIN — ESCITALOPRAM OXALATE 5 MG: 10 TABLET ORAL at 08:39

## 2019-02-15 RX ADMIN — HEPARIN SODIUM 5000 UNITS: 5000 INJECTION INTRAVENOUS; SUBCUTANEOUS at 23:14

## 2019-02-15 RX ADMIN — HYDROMORPHONE HYDROCHLORIDE 0.2 MG: 1 INJECTION, SOLUTION INTRAMUSCULAR; INTRAVENOUS; SUBCUTANEOUS at 10:06

## 2019-02-15 RX ADMIN — PIPERACILLIN SODIUM,TAZOBACTAM SODIUM 2.25 G: 2; .25 INJECTION, POWDER, FOR SOLUTION INTRAVENOUS at 06:30

## 2019-02-15 RX ADMIN — CARVEDILOL 6.25 MG: 6.25 TABLET, FILM COATED ORAL at 08:39

## 2019-02-15 RX ADMIN — CYANOCOBALAMIN TAB 500 MCG 1000 MCG: 500 TAB at 08:39

## 2019-02-15 RX ADMIN — AMLODIPINE BESYLATE 5 MG: 5 TABLET ORAL at 06:21

## 2019-02-15 RX ADMIN — METRONIDAZOLE 500 MG: 500 TABLET ORAL at 23:14

## 2019-02-15 RX ADMIN — CARVEDILOL 6.25 MG: 6.25 TABLET, FILM COATED ORAL at 16:22

## 2019-02-15 RX ADMIN — NEOMYCIN SULFATE, POLYMYXIN B SULFATE, AND PRAMOXINE HYDROCHLORIDE 1 APPLICATION: 3.5; 10000; 1 CREAM TOPICAL at 17:53

## 2019-02-15 RX ADMIN — ATORVASTATIN CALCIUM 80 MG: 40 TABLET, FILM COATED ORAL at 16:22

## 2019-02-15 NOTE — ASSESSMENT & PLAN NOTE
Lab Results   Component Value Date    HGBA1C 7 5 (H) 01/16/2019       Recent Labs     02/14/19  1811 02/14/19  2104 02/15/19  0741 02/15/19  1119   POCGLU 217* 134 243* 199*       Blood Sugar Average: Last 72 hrs:  (P) 198 25   Continue lantus and novolog, patient counseled today on better control at home  SSI

## 2019-02-15 NOTE — ASSESSMENT & PLAN NOTE
· Continue amlodipine/ coreg, blood pressure is slightly elevated, likely secondary to pain  · Will aim for better pain control, add tramadol as needed for moderate pain, continue with IV Dilaudid for breakthrough pain

## 2019-02-15 NOTE — PROGRESS NOTES
SLIM Progress Note - Nubia Navarromarga 1960, 62 y o  male MRN: [id number]    Unit/Bed#: -01 Encounter: [id number]    Primary Care Provider: Rafaela Pedroza DO   Date and time admitted to hospital: 2/14/2019  3:17 PM        * Cellulitis  Assessment & Plan  · Associated with foul smelling drainage  · Wound gram stain initial multi microbial, await final culture results  · Podiatry note reviewed, will switch to IV Ancef, add metronidazole given his diabetic state, pending the culture results  · Podiatry input appreciated, x-ray showing no osteomyelitis  · Surgical shoe, buddy tape, elevate, compression  · Remains afebrile, no leukocytosis    Type 2 diabetes mellitus with hyperglycemia, with long-term current use of insulin [facility])  Assessment & Plan  Lab Results   Component Value Date    HGBA1C 7 5 (H) 01/16/2019       Recent Labs     02/14/19  1811 02/14/19  2104 02/15/19  0741 02/15/19  1119   POCGLU 217* 134 243* 199*       Blood Sugar Average: Last 72 hrs:  (P) 198 25   Continue lantus and novolog, patient counseled today on better control at home  SSI    CKD (chronic kidney disease)  Assessment & Plan  · Cr slightly above baseline on admission, has improved  · Continue to monitor    Cirrhosis (HCC)  Assessment & Plan  · Continue lactulose  · outpt follow up    Benign hypertension with CKD (chronic kidney disease) stage III (HCC)  Assessment & Plan  · Continue amlodipine/ coreg, blood pressure is slightly elevated, likely secondary to pain  · Will aim for better pain control, add tramadol as needed for moderate pain, continue with IV Dilaudid for breakthrough pain    Mixed hyperlipidemia  Assessment & Plan  · Statin, last cholesterol panel showing excellent control with LDL 20        VTE Pharmacologic Prophylaxis:   Pharmacologic: Heparin  Mechanical VTE Prophylaxis in Place: Yes    Patient Centered Rounds: I have performed bedside rounds with nursing staff today      Discussions with Specialists or Other Care Team Provider:  Podiatry note reviewed, discussed with case management regarding discharge planning    Education and Discussions with Family / Patient:  Discussed with the patient, significant other at the bedside    Time Spent for Care: 30 minutes  More than 50% of total time spent on counseling and coordination of care as described above  Current Length of Stay: 1 day(s)    Current Patient Status: Inpatient   Certification Statement: The patient will continue to require additional inpatient hospital stay due to Await culture results, anticipate another 24-48 hours    Discharge Plan:  Likely to home, await final culture results for antibiotic tailoring    Code Status: Level 1 - Full Code      Subjective:   Patient seen and examined, he is doing well  He is having some pain and requesting something different rather than IV medication  No nausea or vomiting, no subjective fevers or chills  His wife is concerned about the swelling in the foot  We discussed management of that including elevation and compression  We discussed the podiatrist recommendations, current antibiotics, and discharge planning  Objective:     Vitals:   Temp (24hrs), Av °F (36 7 °C), Min:97 6 °F (36 4 °C), Max:98 3 °F (36 8 °C)    Temp:  [97 6 °F (36 4 °C)-98 3 °F (36 8 °C)] 97 6 °F (36 4 °C)  HR:  [68-88] 76  Resp:  [16-20] 18  BP: (166-197)/(78-91) 166/79  SpO2:  [94 %-98 %] 94 %  Body mass index is 36 49 kg/m²  Input and Output Summary (last 24 hours): Intake/Output Summary (Last 24 hours) at 2/15/2019 1413  Last data filed at 2/15/2019 1009  Gross per 24 hour   Intake 220 ml   Output    Net 220 ml       Physical Exam:     Physical Exam   Constitutional: Vital signs are normal  He appears well-developed and well-nourished  No distress  Cardiovascular: Normal rate, regular rhythm, S1 normal, S2 normal, normal heart sounds and intact distal pulses  No murmur heard    Pulmonary/Chest: Effort normal and breath sounds normal  No respiratory distress  He has no wheezes  He has no rhonchi  He has no rales  Abdominal: Soft  Bowel sounds are normal    Obese, benign   Musculoskeletal: He exhibits edema (Left, pedal)  Skin: Bruising and lesion noted  There is erythema  Left foot, 4/5th digits   Psychiatric: He has a normal mood and affect  Nursing note and vitals reviewed  Additional Data:     Labs:    Results from last 7 days   Lab Units 02/15/19  0522 02/14/19  1549   WBC Thousand/uL 7 59 9 71   HEMOGLOBIN g/dL 11 8* 11 8*   HEMATOCRIT % 36 4* 36 5   PLATELETS Thousands/uL 167 164   NEUTROS PCT %  --  74   LYMPHS PCT %  --  15   MONOS PCT %  --  8   EOS PCT %  --  2     Results from last 7 days   Lab Units 02/15/19  0522   SODIUM mmol/L 139   POTASSIUM mmol/L 4 5   CHLORIDE mmol/L 107   CO2 mmol/L 21   BUN mg/dL 47*   CREATININE mg/dL 2 59*   ANION GAP mmol/L 11   CALCIUM mg/dL 8 7   ALBUMIN g/dL 2 6*   TOTAL BILIRUBIN mg/dL 0 30   ALK PHOS U/L 154*   ALT U/L 22   AST U/L 14   GLUCOSE RANDOM mg/dL 192*         Results from last 7 days   Lab Units 02/15/19  1119 02/15/19  0741 02/14/19  2104 02/14/19  1811   POC GLUCOSE mg/dl 199* 243* 134 217*                   * I Have Reviewed All Lab Data Listed Above  * Additional Pertinent Lab Tests Reviewed:  [facility]    Imaging:    Imaging Reports Reviewed Today Include:  XR  Imaging Personally Reviewed by Myself Includes:      Recent Cultures (last 7 days):     Results from last 7 days   Lab Units 02/14/19  1549   GRAM STAIN RESULT  No polys seen*  4+ Gram Positive Rods Resembling Diphtheroids*  3+ Gram positive cocci in pairs*  1+ Gram positive cocci in clusters*   WOUND CULTURE  1+ Growth of Gram Negative Braden Enteric Like*       Last 24 Hours Medication List:     Current Facility-Administered Medications:  acetaminophen 975 mg Oral Q6H Val Rodarte PA-C    amLODIPine 5 mg Oral Q12H Radha Mathur MD    aspirin 81 mg Oral Daily Keenan Robert MD    atorvastatin 80 mg Oral Daily With Marissa Dhillon MD    carvedilol 6 25 mg Oral BID With Meals Keenan Robert MD    cefazolin 2,000 mg Intravenous Q8H Triny Renee PA-C Last Rate: Stopped (02/15/19 1040)   cyanocobalamin 1,000 mcg Oral Daily Keenan Robert MD    [START ON 2/17/2019] ergocalciferol 50,000 Units Oral Weekly Keenan Robert MD    escitalopram 5 mg Oral Daily Keenan Robert MD    famotidine 20 mg Oral Daily Keenan Robert MD    gabapentin 600 mg Oral HS Keenan Robert MD    glucose 12 g Oral PRN Keenan Robert MD    heparin (porcine) 5,000 Units Subcutaneous Q8H [facility] Ashwini Garcia MD    HYDROmorphone 0 2 mg Intravenous Q4H PRN Val Rodarte PA-C    insulin glargine 25 Units Subcutaneous Daily Keenan Robert MD    insulin lispro 1-5 Units Subcutaneous TID AC Keenan Robert MD    insulin lispro 8 Units Subcutaneous TID With Meals Keenan Robert MD    lactulose 20 g Oral Daily PRN Keenan Robert MD    metroNIDAZOLE 500 mg Oral Q8H [facility] Alicia Day PA-C    neomycin-polymyxin-pramoxine 1 application Topical BID Keenan Robert MD    ondansetron 4 mg Oral Q6H PRN Keenan Robert MD    traMADol 25 mg Oral Q6H PRN Triny Renee PA-C         Today, Patient Was Seen By: Triny Renee PA-C    ** Please Note: Dictation voice to text software may have been used in the creation of this document   **

## 2019-02-15 NOTE — CONSULTS
Consult - Podiatry   Renae Schrader 62 y o  male MRN: [id number]  Unit/Bed#: -01 Encounter: [id number]    Assessment/Plan     Assessment:  1  Left 5th toe fracture  2  Left 4th toe ulcer, gloria 1 with blister  3  DM neuropathy  4  Mild cellulitis to toes  Plan:  1  Wound is minor, likely secondary to the heavy edema since the forefoot trauma  I would treat the cellulitis with 24-48 hours of IV with hopeful transition to oral antibiotics for a total of 10 days  Ancef/Keflex should be adequate  2  Regarding the fractured 5th toe, conservative care should be fine  Louann splint, surgical shoe, and rest  3  Patient concerned with "severe pain" but I was able to manipulate the broken toe with no reaction of pain  He only reacted when he saw I was moving the toe  There is likely a component of neuropathic pain involved  In addition, the swelling is likely also painful  I ordered Teds stocking for the limb  History of Present Illness     HPI:  Renae Schrader is a 62 y o  male who presents with pain and cellulitis of his left foot  Approximately 2 5 weeks ago he broke toes on his left foot  He saw Dr Wallace Bahena who instructed him to rest, wear surgical shoe, and louann splint the toes  Patient states he became concerned because of pain and swelling  He also saw a small blister  His main concern this morning is the swelling and pain  He denies any N/F/V/C/D/CP/SOB  PMH is significant for DM neuropathy, previous right toe amputation, and obesity  He does not smoke  He does not follow podiatry regularly but will call our office if there are concerns  Consults  Review of Systems   Constitutional: Negative  HENT: Negative  Eyes: Negative  Respiratory: Negative  Cardiovascular: Negative  Gastrointestinal: Negative  Musculoskeletal: left lower extremity pain and swelling  Skin:blisters left toes   Neurological: burning and neuropathy      Psych: negative      Historical Information   Past Medical History:   Diagnosis Date    [facility] mellitus ([facility] [address] )     Hypercholesteremia     Hyperlipidemia     Hypertension     Neuropathy     Obesity     Osteomyelitis ([facility] [address] )     last assessed 11/4/16    PVC's (premature ventricular contractions)     sees cardiology Dr Reuben camargo    Stroke [facility])     last weeof July 2018 [facility] [employer]     Past Surgical History:   Procedure Laterality Date    ABDOMINAL SURGERY      CHOLECYSTECTOMY      Percutaneous    OTHER SURGICAL HISTORY      "stimulator to control bowel movements"    ND ESOPHAGOGASTRODUODENOSCOPY TRANSORAL DIAGNOSTIC N/A 9/27/2016    Procedure: ESOPHAGOGASTRODUODENOSCOPY (EGD); Surgeon: Monisha Reese MD;  [facility];   Service: Gastroenterology    ND LAP,CHOLECYSTECTOMY N/A 2/29/2016    Procedure: LAPAROSCOPIC CHOLECYSTECTOMY ;  Surgeon: Zachary Sr DO;  [facility];  Service: General    ROTATOR CUFF REPAIR Right     TOE AMPUTATION Right 10/28/2016    Procedure: 3RD TOE AMPUTATION ;  Surgeon: Manuel Ely DPM;  [facility];  Service:      Social History   Social History     Substance and Sexual Activity   Alcohol Use No     Social History     Substance and Sexual Activity   Drug Use No     Social History     Tobacco Use   Smoking Status Never Smoker   Smokeless Tobacco Never Used     Family History:   Family History   Problem Relation Age of Onset    Leukemia Mother     Liver disease Mother     Lung cancer Mother         heavy smoker - 3 ppd    Heart disease Father     Liver disease Father     Multiple myeloma Sister     Breast cancer Sister     Urolithiasis Family     Alcohol abuse Neg Hx     Depression Neg Hx     Drug abuse Neg Hx     Substance Abuse Neg Hx     Mental illness Neg Hx        Meds/Allergies   Medications Prior to Admission   Medication    amLODIPine (NORVASC) 5 mg tablet    aspirin (ECOTRIN LOW STRENGTH) 81 mg EC tablet    atorvastatin (LIPITOR) 80 mg tablet    Blood Glucose Monitoring Suppl (ONE TOUCH ULTRA MINI) w/Device KIT    Blood Pressure Monitoring (BLOOD PRESSURE CUFF) MISC    carvedilol (COREG) 6 25 mg tablet    cholecalciferol 35627 units TABS    [employer] GLUCOSE 4-6 GM-MG    cyanocobalamin 1000 MCG tablet    diclofenac sodium (VOLTAREN) 1 %    escitalopram (LEXAPRO) 5 mg tablet    famotidine (PEPCID) 20 mg tablet    gabapentin (NEURONTIN) 250 mg/5 mL solution    glucose 4 g chewable tablet    glucose blood (ONE TOUCH ULTRA TEST) test strip    Incontinence Supplies (MALE URINAL) MISC    insulin aspart (NovoLOG) 100 units/mL injection    insulin glargine (LANTUS) 100 units/mL subcutaneous injection    Insulin Syringe-Needle U-100 (B-D INS SYR ULTRAFINE  3CC/30G) 30G X 1/2" 0 3 ML MISC    Insulin Syringe-Needle U-100 (B-D INS SYRINGE 0 5CC/30GX1/2") 30G X 1/2" 0 5 ML MISC    lactulose 20 g/30 mL    LUCENTIS 0 3 MG/0 05ML    neomycin-bacitracin-polymyxin b (NEOSPORIN) ointment    ondansetron (ZOFRAN-ODT) 4 mg disintegrating tablet    ONETOUCH DELICA LANCETS 43J MISC     No Known Allergies    Objective   First Vitals:   Blood Pressure: (!) 172/83 (02/14/19 1505)  Pulse: 86 (02/14/19 1505)  Temperature: 98 2 °F (36 8 °C) (02/14/19 1505)  Temp Source: Oral (02/14/19 1505)  Respirations: 18 (02/14/19 1505)  Height: 5' 8" (172 7 cm) (02/14/19 1505)  Weight - Scale: 109 kg (240 lb) (02/14/19 1505)  SpO2: 98 % (02/14/19 1505)    Current Vitals:   Blood Pressure: 166/79 (02/15/19 0700)  Pulse: 76 (02/15/19 0700)  Temperature: 97 6 °F (36 4 °C) (02/15/19 0700)  Temp Source: Oral (02/15/19 0700)  Respirations: 18 (02/15/19 0700)  Height: 5' 8" (172 7 cm) (02/14/19 1505)  Weight - Scale: 109 kg (240 lb) (02/14/19 1505)  SpO2: 94 % (02/15/19 0700)        /79 (BP Location: Left arm)   Pulse 76   Temp 97 6 °F (36 4 °C) (Oral)   Resp 18   Ht 5' 8" (1 727 m)   Wt 109 kg (240 lb)   SpO2 94%   BMI 36 49 kg/m²   Physical Exam:  General:    Alert, cooperative    Patient is obese and anxious  Head:    Normocephalic, without obvious abnormality, atraumatic   Eyes:    EOM's intact            Nose:   Moist mucous membranes, no drainage or sinus tenderness   Throat:   No tenderness, no exudates   Neck:   Supple, symmetrical, trachea midline, no JVD   Back:     Symmetric, no CVA tenderness   Lungs:      respirations unlabored   Chest wall:    No tenderness or deformity   Heart:    Regular rate and rhythm   Abdomen:     Soft, non-tender, bowel sounds active all four quadrants           Extremities:   Vascular: Dorsalis pedis and posterior tibial pulses 2/4  Capillary refill time within normal limits to all digits  Erythema to the left lateral forefoot stemming from the 2nd and 4th toes with ecchymosis and mild blistering to the digits  Derm:  There is a ulcer measuring 0 2 x 0 3 x 0 2 cm on the lateral aspect of the left 4th toe  There is no sign of acute infection but there is mild mona wound erythema and edema  No probe to structure, no pus  Motor:  Muscle strength is normal   The right 3rd toe is partially amputated from her previous surgery in stable     Neuro:  Lack of pinprick sensation to the toes  Diminished vibratory sensation to the forefoot  Neurologic:   CNII-XII intact        Lab Results:   Admission on 02/14/2019   Component Date Value    WBC 02/14/2019 9 71     RBC 02/14/2019 4 17     Hemoglobin 02/14/2019 11 8*    Hematocrit 02/14/2019 36 5     MCV 02/14/2019 88     MCH 02/14/2019 28 3     MCHC 02/14/2019 32 3     RDW 02/14/2019 13 6     MPV 02/14/2019 10 8     Platelets 04/09/5180 164     nRBC 02/14/2019 0     Neutrophils Relative 02/14/2019 74     Immat GRANS % 02/14/2019 1     Lymphocytes Relative 02/14/2019 15     Monocytes Relative 02/14/2019 8     Eosinophils Relative 02/14/2019 2     Basophils Relative 02/14/2019 0     Neutrophils Absolute 02/14/2019 7 21     Immature Grans Absolute 02/14/2019 0 05     Lymphocytes Absolute 02/14/2019 1 48     Monocytes Absolute 02/14/2019 0 80     Eosinophils Absolute 02/14/2019 0 15     Basophils Absolute 02/14/2019 0 02     Sodium 02/14/2019 139     Potassium 02/14/2019 4 5     Chloride 02/14/2019 105     CO2 02/14/2019 24     ANION GAP 02/14/2019 10     BUN 02/14/2019 54*    Creatinine 02/14/2019 2 84*    Glucose 02/14/2019 327*    Calcium 02/14/2019 8 9     AST 02/14/2019 14     ALT 02/14/2019 24     Alkaline Phosphatase 02/14/2019 209*    Total Protein 02/14/2019 6 9     Albumin 02/14/2019 2 9*    Total Bilirubin 02/14/2019 0 30     eGFR 02/14/2019 23     POC Glucose 02/14/2019 217*    POC Glucose 02/14/2019 134     Sodium 02/15/2019 139     Potassium 02/15/2019 4 5     Chloride 02/15/2019 107     CO2 02/15/2019 21     ANION GAP 02/15/2019 11     BUN 02/15/2019 47*    Creatinine 02/15/2019 2 59*    Glucose 02/15/2019 192*    Calcium 02/15/2019 8 7     AST 02/15/2019 14     ALT 02/15/2019 22     Alkaline Phosphatase 02/15/2019 154*    Total Protein 02/15/2019 6 5     Albumin 02/15/2019 2 6*    Total Bilirubin 02/15/2019 0 30     eGFR 02/15/2019 26     WBC 02/15/2019 7 59     RBC 02/15/2019 4 19     Hemoglobin 02/15/2019 11 8*    Hematocrit 02/15/2019 36 4*    MCV 02/15/2019 87     MCH 02/15/2019 28 2     MCHC 02/15/2019 32 4     RDW 02/15/2019 13 6     Platelets 92/38/5822 167     MPV 02/15/2019 11 2     POC Glucose 02/15/2019 243*                 Invalid input(s): LABAEARO              Imaging: I have personally reviewed pertinent films in PACS  X-ray was also reviewed with the patient  Known 5th proximal phalanx metaphyseal fracture  EKG, Pathology, and Other Studies: I have personally reviewed pertinent reports  Code Status: Level 1 - Full Code  Advance Directive and Living Will:      Power of :    POLST:        Portions of the record may have been created with voice recognition software   Occasional wrong word or "sound a like" substitutions may have occurred due to the inherent limitations of voice recognition software  Read the chart carefully and recognize, using context, where substitutions have occurred

## 2019-02-15 NOTE — PLAN OF CARE
Problem: Potential for Falls  Goal: Patient will remain free of falls  Description  INTERVENTIONS:  - Assess patient frequently for physical needs  -  Identify cognitive and physical deficits and behaviors that affect risk of falls  -  [place] fall precautions as indicated by assessment   - Educate patient/family on patient safety including physical limitations  - Instruct patient to call for assistance with activity based on assessment  - Modify environment to reduce risk of injury  - Consider OT/PT consult to assist with strengthening/mobility  Outcome: Progressing     Problem: Nutrition/Hydration-ADULT  Goal: Nutrient/Hydration intake appropriate for improving, restoring or maintaining nutritional needs  Description  Monitor and assess patient's nutrition/hydration status for malnutrition (ex- brittle hair, bruises, dry skin, pale skin and conjunctiva, muscle wasting, smooth red tongue, and disorientation)  Collaborate with interdisciplinary team and initiate plan and interventions as ordered  Monitor patient's weight and dietary intake as ordered or per policy  Utilize nutrition screening tool and intervene per policy  Determine patient's food preferences and provide high-protein, high-caloric foods as appropriate       INTERVENTIONS:  - Monitor oral intake, urinary output, labs, and treatment plans  - Assess nutrition and hydration status and recommend course of action  - Evaluate amount of meals eaten  - Assist patient with eating if necessary   - Allow adequate time for meals  - Recommend/ encourage appropriate diets, oral nutritional supplements, and vitamin/mineral supplements  - Order, calculate, and assess calorie counts as needed  - Recommend, monitor, and adjust tube feedings and TPN/PPN based on assessed needs  - Assess need for intravenous fluids  - Provide specific nutrition/hydration education as appropriate  - Include patient/family/caregiver in decisions related to nutrition  Outcome: Progressing     Problem: PAIN - ADULT  Goal: Verbalizes/displays adequate comfort level or baseline comfort level  Description  Interventions:  - Encourage patient to monitor pain and request assistance  - Assess pain using appropriate pain scale  - Administer analgesics based on type and severity of pain and evaluate response  - Implement non-pharmacological measures as appropriate and evaluate response  - Consider cultural and social influences on pain and pain management  - Notify physician/advanced practitioner if interventions unsuccessful or patient reports new pain  Outcome: Progressing     Problem: INFECTION - ADULT  Goal: Absence or prevention of progression during hospitalization  Description  INTERVENTIONS:  - Assess and monitor for signs and symptoms of infection  - Monitor lab/diagnostic results  - Monitor all insertion sites, i e  indwelling lines, tubes, and drains  - Monitor endotracheal (as able) and nasal secretions for changes in amount and color  - [place] appropriate cooling/warming therapies per order  - Administer medications as ordered  - Instruct and encourage patient and family to use good hand hygiene technique  - Identify and instruct in appropriate isolation precautions for identified infection/condition  Outcome: Progressing  Goal: Absence of fever/infection during neutropenic period  Description  INTERVENTIONS:  - Monitor WBC  - Implement neutropenic guidelines  Outcome: Progressing     Problem: SAFETY ADULT  Goal: Maintain or return to baseline ADL function  Description  INTERVENTIONS:  -  Assess patient's ability to carry out ADLs; assess patient's baseline for ADL function and identify physical deficits which impact ability to perform ADLs (bathing, care of mouth/teeth, toileting, grooming, dressing, etc )  - Assess/evaluate cause of self-care deficits   - Assess range of motion  - Assess patient's mobility; develop plan if impaired  - Assess patient's need for assistive devices and provide as appropriate  - Encourage maximum independence but intervene and supervise when necessary  ¯ Involve family in performance of ADLs  ¯ Assess for home care needs following discharge   ¯ Request OT consult to assist with ADL evaluation and planning for discharge  ¯ Provide patient education as appropriate  Outcome: Progressing  Goal: Maintain or return mobility status to optimal level  Description  INTERVENTIONS:  - Assess patient's baseline mobility status (ambulation, transfers, stairs, etc )    - Identify cognitive and physical deficits and behaviors that affect mobility  - Identify mobility aids required to assist with transfers and/or ambulation (gait belt, sit-to-stand, lift, walker, cane, etc )  - [place] fall precautions as indicated by assessment  - Record patient progress and toleration of activity level on Mobility SBAR; progress patient to next Phase/Stage  - Instruct patient to call for assistance with activity based on assessment  - Request Rehabilitation consult to assist with strengthening/weightbearing, etc   Outcome: Progressing     Problem: DISCHARGE PLANNING  Goal: Discharge to home or other facility with appropriate resources  Description  INTERVENTIONS:  - Identify barriers to discharge w/patient and caregiver  - Arrange for needed discharge resources and transportation as appropriate  - Identify discharge learning needs (meds, wound care, etc )  - Arrange for interpretive services to assist at discharge as needed  - Refer to Case Management Department for coordinating discharge planning if the patient needs post-hospital services based on physician/advanced practitioner order or complex needs related to functional status, cognitive ability, or social support system  Outcome: Progressing     Problem: Knowledge Deficit  Goal: Patient/family/caregiver demonstrates understanding of disease process, treatment plan, medications, and discharge instructions  Description  Complete learning assessment and assess knowledge base    Interventions:  - Provide teaching at level of understanding  - Provide teaching via preferred learning methods  Outcome: Progressing

## 2019-02-15 NOTE — UTILIZATION REVIEW
Initial Clinical Review    Admission:  2/14/19 @ 1608   Orders Placed This Encounter   Procedures    Inpatient Admission (expected length of stay for this patient Order details is greater than two midnights)     Standing Status:   Standing     Number of Occurrences:   1     Order Specific Question:   Admitting Physician     Answer:   Deangelo Encinas [[id number]]     Order Specific Question:   Level of Care     Answer:   Med Surg [[id number]]     Order Specific Question:   Estimated length of stay     Answer:   More than 2 Midnights     Order Specific Question:   Certification     Answer:   I certify that inpatient services are medically necessary for this patient for a duration of greater than two midnights  See H&P and MD Progress Notes for additional information about the patient's course of treatment  ED: Date/Time/Mode of Arrival:   ED Arrival Information     Expected Arrival Acuity Means of Arrival Escorted By Service Admission Type    - 2/14/2019 14:57 Urgent Walk-In Family Member Hospitalist Urgent    Arrival Complaint    TOE/FOOT INJURY        Chief Complaint:   Chief Complaint   Patient presents with    Toe Injury     patient reports having broken toes over the past 3 weeks  now c/o having increased swelling started today with worse pain and leg swelling and pain  History of Illness:   62 y o  male who presents with injury to lt 4th and 5th toes with fractures 3 weeks ago, now presents with redness swelling pain progressing proximally  from that area and associated with a foul smelling drainage from the dorsal surface  PMH is significant for DM neuropathy, previous right toe amputation, and obesity  He does not smoke       ED Vital Signs:   ED Triage Vitals [02/14/19 1505]   Temperature Pulse Respirations Blood Pressure SpO2   98 2 °F (36 8 °C) 86 18 (!) 172/83 98 %      Temp Source Heart Rate Source Patient Position - Orthostatic VS BP Location FiO2 (%)   Oral Monitor Sitting Left arm --      Pain Score 9        Wt Readings from Last 1 Encounters:   02/14/19 109 kg (240 lb)       02/14/19 1819  98 3 °F (36 8 °C)  82  20  197/91Abnormal   94 %  None (Room air)  Lying       Pertinent Labs/Diagnostic Test Results:  Bun  54  47  crt  2 84   2 59  Serum glucose  327   192  Alk phos  209   154  Wbc  9 71   left foot xray -  Fracture the proximal phalanx of the left 5th toe, unchanged since 1/28/2019      ED Treatment:   Medication Administration from 02/14/2019 1457 to 02/14/2019 1701       Date/Time Order Dose Route Action Action by Comments     02/14/2019 1547 fentanyl citrate (PF) 100 MCG/2ML 50 mcg 50 mcg Intravenous Given Elisa Hernandez RN      02/14/2019 1632 vancomycin (VANCOCIN) 1,750 mg in sodium chloride 0 9 % 500 mL IVPB 1,750 mg Intravenous New Bag Elisa Hernandez RN                 02/14/2019 1614 cefepime (MAXIPIME) 2 g/50 mL dextrose IVPB 2,000 mg Intravenous New Bag Elisa Hernandez RN      02/14/2019 1638 sodium chloride 0 9 % bolus 1,000 mL 1,000 mL Intravenous New Bag Elisa Hernandez RN         Past Medical/Surgical History:    Active Ambulatory Problems     Diagnosis Date Noted    Type 2 diabetes mellitus with hyperglycemia, with long-term current use of insulin ([facility] ) 02/26/2016    Mixed hyperlipidemia 02/26/2016    Benign hypertension with CKD (chronic kidney disease) stage III ([facility] ) 10/25/2016    Cerebrovascular accident (CVA) due to thrombosis of left middle cerebral artery ([facility] ) 07/29/2018    Cognitive impairment 08/03/2018    Elevated alkaline phosphatase level 08/03/2018    CKD (chronic kidney disease) 08/03/2018    Diabetic macular edema (HCC) 08/09/2018    GERD (gastroesophageal reflux disease) 08/13/2018    Cirrhosis ([facility] ) 08/17/2016    Diabetic polyneuropathy associated with type 2 diabetes mellitus ([facility] ) 01/26/2016    Other constipation 08/17/2018    Abnormal EEG 09/24/2018    History of stroke 10/04/2018    TIA (transient ischemic attack) 10/28/2018  Stroke-like symptoms, with right-sided weakness 10/28/2018    Hypoglycemia, transient episode 10/28/2018     Resolved Ambulatory Problems     Diagnosis Date Noted    ADEOLA (acute kidney injury) ([place] [address] ) 02/26/2016    Intractable vomiting 02/26/2016    Dizziness 02/26/2016    Lactic acid acidosis 02/26/2016    Cholecystostomy care ([place] [address] ) 02/26/2016    Diabetic foot ulcer ([place] [address] ) 10/24/2016    Osteomyelitis of ankle or foot, right, acute ([place] [address] ) 10/29/2016    Acute-on-chronic renal failure ([place] [address] ) 10/29/2016    Intermittent diarrhea 03/12/2018    Other proteinuria 03/28/2018    Acute pain of left wrist 04/16/2018    Deformity of toenail 05/22/2018    Aphasia 07/29/2018     Past Medical History:   Diagnosis Date    Diabetes mellitus ([place] [address] )     Hypercholesteremia     Hyperlipidemia     Hypertension     Neuropathy     Obesity     Osteomyelitis ([place] [address] )     PVC's (premature ventricular contractions)     Stroke [facility])      Admitting Diagnosis: Toe pain [M79 676]  Wound infection [T14  8XXA, L08 9]  Diabetic ulcer of toe of left foot associated with type 2 diabetes mellitus, limited to breakdown of skin ([place] [address] ) [H47 471, L97 521]    Assessment/Plan:  * Cellulitis  fup wound culture/sensitivity;  IV zosyn  Monitor wbc, temp,  Serial exams of foot  Podiatry consult     T2DM -  Cont home lantus and novolog + sliding scale insulin    CKD -   Cr slightly above baseline on admission, has improved  · Continue to monitor     Cirrhosis - cont lactulose ;  outpt fup  HTN -  Cont norvasc, coreg, monitor,  Pain control       Pedro Neves Admission Orders:  Admit med surg  Consult podiatry  sequential compression device to b/l LE  accucks qid w/sliding scale insulin  Lo carb diet  IV zosyn q 6 hrs     2/15/2019  98 7   72   18   139/65   91% ra   IV dilaudid @  1006    2/15  PODIATRY   Assessment:  1  Left 5th toe fracture  2   Left 4th toe ulcer, gloria 1 with blister  3  DM neuropathy  4  Mild cellulitis to toes    Plan:  1  Wound is minor, likely secondary to the heavy edema since the forefoot trauma  I would treat the cellulitis with 24-48 hours of IV with hopeful transition to oral antibiotics for a total of 10 days  Ancef/Keflex should be adequate  2  Regarding the fractured 5th toe, conservative care should be fine  Som splint, surgical shoe, and rest  3  Patient concerned with "severe pain" but I was able to manipulate the broken toe with no reaction of pain  He only reacted when he saw I was moving the toe  There is likely a component of neuropathic pain involved  In addition, the swelling is likely also painful   I ordered Teds stocking for the limb         INTERNAL MEDICINE  · Wound gram stain initial multi microbial, await final culture results  · Podiatry note reviewed, will switch to IV Ancef, add metronidazole given his diabetic state, pending the culture results    Scheduled Meds:   Current Facility-Administered Medications:  acetaminophen 975 mg Oral Q6H Val Rodarte PA-C    amLODIPine 5 mg Oral Q12H Jg Medeiros MD    aspirin 81 mg Oral Daily Jg Medeiros MD    atorvastatin 80 mg Oral Daily With Bubba Moore MD    carvedilol 6 25 mg Oral BID With Meals Jg Medeiros MD    cefazolin 2,000 mg Intravenous Q8H Alicia Day PA-C Last Rate: Stopped (02/15/19 1040)   cyanocobalamin 1,000 mcg Oral Daily Jg Medeiros MD    [START ON 2/17/2019] ergocalciferol 50,000 Units Oral Weekly Jg Medeiros MD    escitalopram 5 mg Oral Daily Jg Medeiros MD    famotidine 20 mg Oral Daily Jg Medeiros MD    gabapentin 600 mg Oral HS Jg Medeiros MD    glucose 12 g Oral PRN Jg Medeiros MD    heparin (porcine) 5,000 Units Subcutaneous Q8H [address] Ashwini Garcia MD    HYDROmorphone 0 2 mg Intravenous Q4H PRN Val Rodarte PA-C    insulin glargine 25 Units Subcutaneous Daily Latoya Jaimes MD    insulin lispro 1-5 Units Subcutaneous TID AC Latoya Jaimes MD    insulin lispro 8 Units Subcutaneous TID With Meals Latoya Jaimes MD    lactulose 20 g Oral Daily PRN Latoya Jaimes MD    metroNIDAZOLE 500 mg Oral Q8H [facility] Alicia Day PA-C    neomycin-polymyxin-pramoxine 1 application Topical BID Latoya Jaimes MD    ondansetron 4 mg Oral Q6H PRN Latoya Jaimes MD    traMADol 25 mg Oral Q6H PRN Ayesha Tidwell PA-C

## 2019-02-15 NOTE — PROGRESS NOTES
Patient resting in chair; assisted back into bed for the evening  Complains of pain in right foot; medication given as prescribed  Bed alarm on, call bell within reach, and safety measures in place  Will continue to monitor

## 2019-02-15 NOTE — ASSESSMENT & PLAN NOTE
· Associated with foul smelling drainage  · Wound culture initial multi microbial, await final results  · Podiatry note reviewed, will switch to IV Ancef, add metronidazole given his diabetic state, pending the culture results  · Podiatry input appreciated, x-ray showing no osteomyelitis  · Surgical shoe, louann tape, elevate, compression

## 2019-02-16 VITALS
TEMPERATURE: 98.3 F | WEIGHT: 240 LBS | HEIGHT: 68 IN | SYSTOLIC BLOOD PRESSURE: 131 MMHG | RESPIRATION RATE: 18 BRPM | BODY MASS INDEX: 36.37 KG/M2 | DIASTOLIC BLOOD PRESSURE: 74 MMHG | OXYGEN SATURATION: 97 % | HEART RATE: 78 BPM

## 2019-02-16 LAB
GLUCOSE SERPL-MCNC: 174 MG/DL (ref 65–140)
GLUCOSE SERPL-MCNC: 201 MG/DL (ref 65–140)

## 2019-02-16 PROCEDURE — 99239 HOSP IP/OBS DSCHRG MGMT >30: CPT | Performed by: PHYSICIAN ASSISTANT

## 2019-02-16 PROCEDURE — 82948 REAGENT STRIP/BLOOD GLUCOSE: CPT

## 2019-02-16 RX ORDER — CIPROFLOXACIN 500 MG/1
500 TABLET, FILM COATED ORAL EVERY 12 HOURS SCHEDULED
Qty: 18 TABLET | Refills: 0 | Status: SHIPPED | OUTPATIENT
Start: 2019-02-16 | End: 2019-02-25

## 2019-02-16 RX ORDER — METRONIDAZOLE 500 MG/1
500 TABLET ORAL EVERY 8 HOURS SCHEDULED
Qty: 27 TABLET | Refills: 0 | Status: SHIPPED | OUTPATIENT
Start: 2019-02-16 | End: 2019-02-25

## 2019-02-16 RX ADMIN — INSULIN LISPRO 1 UNITS: 100 INJECTION, SOLUTION INTRAVENOUS; SUBCUTANEOUS at 13:39

## 2019-02-16 RX ADMIN — INSULIN GLARGINE 25 UNITS: 100 INJECTION, SOLUTION SUBCUTANEOUS at 08:17

## 2019-02-16 RX ADMIN — AMLODIPINE BESYLATE 5 MG: 5 TABLET ORAL at 05:06

## 2019-02-16 RX ADMIN — ACETAMINOPHEN 975 MG: 325 TABLET, FILM COATED ORAL at 05:07

## 2019-02-16 RX ADMIN — ESCITALOPRAM OXALATE 5 MG: 10 TABLET ORAL at 08:17

## 2019-02-16 RX ADMIN — NEOMYCIN SULFATE, POLYMYXIN B SULFATE, AND PRAMOXINE HYDROCHLORIDE 1 APPLICATION: 3.5; 10000; 1 CREAM TOPICAL at 09:53

## 2019-02-16 RX ADMIN — CARVEDILOL 6.25 MG: 6.25 TABLET, FILM COATED ORAL at 08:17

## 2019-02-16 RX ADMIN — HEPARIN SODIUM 5000 UNITS: 5000 INJECTION INTRAVENOUS; SUBCUTANEOUS at 05:06

## 2019-02-16 RX ADMIN — FAMOTIDINE 20 MG: 20 TABLET ORAL at 08:17

## 2019-02-16 RX ADMIN — INSULIN LISPRO 8 UNITS: 100 INJECTION, SOLUTION INTRAVENOUS; SUBCUTANEOUS at 13:40

## 2019-02-16 RX ADMIN — CEFAZOLIN SODIUM 2000 MG: 2 SOLUTION INTRAVENOUS at 09:42

## 2019-02-16 RX ADMIN — INSULIN LISPRO 1 UNITS: 100 INJECTION, SOLUTION INTRAVENOUS; SUBCUTANEOUS at 08:24

## 2019-02-16 RX ADMIN — CEFAZOLIN SODIUM 2000 MG: 2 SOLUTION INTRAVENOUS at 00:58

## 2019-02-16 RX ADMIN — CYANOCOBALAMIN TAB 500 MCG 1000 MCG: 500 TAB at 08:17

## 2019-02-16 RX ADMIN — ASPIRIN 81 MG: 81 TABLET, COATED ORAL at 08:17

## 2019-02-16 RX ADMIN — METRONIDAZOLE 500 MG: 500 TABLET ORAL at 05:06

## 2019-02-16 RX ADMIN — INSULIN LISPRO 8 UNITS: 100 INJECTION, SOLUTION INTRAVENOUS; SUBCUTANEOUS at 08:24

## 2019-02-16 NOTE — DISCHARGE SUMMARY
Discharge Summary - [address] [facility]    Patient Information: Leo Khan 62 y o  male MRN: [id number]  Unit/Bed#: -01 Encounter: [id number]    Discharging Physician / Practitioner: Rupal Rothman PA-C  PCP: Lazaro Chen DO  Admission Date:   Admission Orders (From admission, onward)    Ordered        02/14/19 1608  Inpatient Admission (expected length of stay for this patient Order details is greater than two midnights)  Once     Order ID Start Status   [id number] 02/14/19 1609 Completed              Discharge Date: 02/16/19    Reason for Admission:  Foot cellulitis and pain    Discharge Diagnoses:     Principal Problem:    Cellulitis  Active Problems:    Type 2 diabetes mellitus with hyperglycemia, with long-term current use of insulin (HCC)    Mixed hyperlipidemia    Benign hypertension with CKD (chronic kidney disease) stage III (HCC)    CKD (chronic kidney disease)    Cirrhosis ([facility] )  Resolved Problems:    * No resolved hospital problems  *      Consultations During Hospital Stay:  · Podiatry    Procedures Performed:     · Left foot xray: Fracture the proximal phalanx of the left 5th toe, unchanged since 1/28/2019  Significant Findings / Test Results:     · Mild left foot cellulitis  · Diabetic foot ulcer of left 4th toe  · Left 5th toe proximal phalanx fracture    Incidental Findings:   · None    Test Results Pending at Discharge (will require follow up): · Final cultures     Outpatient Tests Requested:  · Follow-up with Podiatry  · Follow-up with PCP    Complications:  None    Hospital Course:     Leo Khan is a 62 y o  male patient who originally presented to the hospital on 2/14/2019 due to left foot pain  The patient was noted to hit his foot when he got up in the middle of the night approximately 3 weeks ago and was noted to have left 4th and 5th toe fractures    He was instructed to wear surgical shoe at rest   He was then noted to present to the emergency department with increasing erythema, edema and pain progressing proximal from that area as well as foul-smelling drainage from the surface of the foot  The patient was subsequently admitted and placed on Ancef  He has past medical history of diabetes, chronic kidney disease stage 3, cirrhosis and hypertension  The patient was noted to be started on cefepime, then transitioned Ancef and Flagyl  The patient's cellulitis was noted to improve  Wound culture which was obtained from site of drainage was noted to grow E coli  This was noted to be resistant to some cephalosporins  It was sensitive to Cipro  He will be discharged on Cipro Flagyl given he has DM and toe ulceration for anaerobic coverage as well  He had no fever, leukocytosis  His creatinine was noted to be a baseline of approximately 2 5  Xray showed no osteo  The patient was noted to have an A1C of 7 5  He will need to follow up as outpaitnet in reagrds to his DM  He will be discharged with Cipro and Flagyl to complete a 10 day course    Estimated Creatinine Clearance: 37 2 mL/min (A) (by C-G formula based on SCr of 2 59 mg/dL (H))  Condition at Discharge: fair     Discharge Day Visit / Exam:     Subjective:  Feels good  Wants to go home  Already dressed and sitting on the bed wanting to be discharged  Denies any allergies to medications  Reports he has no fevers or chills  Less erythema and pain over the site  Vitals: Blood Pressure: 131/74 (02/16/19 0807)  Pulse: 78 (02/16/19 0807)  Temperature: 98 3 °F (36 8 °C) (02/16/19 0807)  Temp Source: Oral (02/16/19 0807)  Respirations: 18 (02/16/19 0807)  Height: 5' 8" (172 7 cm) (02/14/19 1505)  Weight - Scale: 109 kg (240 lb) (02/14/19 1505)  SpO2: 97 % (02/16/19 0807)  Exam:   Physical Exam   Constitutional: No distress  HENT:   Head: Normocephalic and atraumatic  Eyes: No scleral icterus  Cardiovascular: Normal rate, regular rhythm, normal heart sounds and intact distal pulses     No murmur heard   Pulmonary/Chest: Effort normal and breath sounds normal  No accessory muscle usage  No respiratory distress  He has no wheezes  He has no rales  Abdominal: Soft  Bowel sounds are normal  He exhibits no distension  There is no tenderness  There is no rigidity, no rebound and no guarding  Musculoskeletal: He exhibits no edema  Left foot with mild ulceration on the plantar aspect of the foot  Mild erythema to the toes  No forefoot erythema  Edema noted  Slight tenderness to palpation  Neurological: He is alert  Skin: Skin is warm and dry  No rash noted  He is not diaphoretic  No erythema  Psychiatric: He has a normal mood and affect  His behavior is normal    Nursing note and vitals reviewed  Discussion with Family: patient, wife    Discharge instructions/Information to patient and family:   See after visit summary for information provided to patient and family  Provisions for Follow-Up Care:  See after visit summary for information related to follow-up care and any pertinent home health orders  Disposition:     Home    For Discharges to [facility] SNF:   · Not Applicable to this Patient - Not Applicable to this Patient    Planned Readmission: no     Discharge Statement:  I spent 35 minutes discharging the patient  This time was spent on the day of discharge  I had direct contact with the patient on the day of discharge  Greater than 50% of the total time was spent examining patient, answering all patient questions, arranging and discussing plan of care with patient as well as directly providing post-discharge instructions  Additional time then spent on discharge activities  Discharge Medications:  See after visit summary for reconciled discharge medications provided to patient and family        ** Please Note: This note has been constructed using a voice recognition system **

## 2019-02-16 NOTE — PLAN OF CARE
Problem: Potential for Falls  Goal: Patient will remain free of falls  Description  INTERVENTIONS:  - Assess patient frequently for physical needs  -  Identify cognitive and physical deficits and behaviors that affect risk of falls  -  [place] fall precautions as indicated by assessment   - Educate patient/family on patient safety including physical limitations  - Instruct patient to call for assistance with activity based on assessment  - Modify environment to reduce risk of injury  - Consider OT/PT consult to assist with strengthening/mobility  Outcome: Progressing     Problem: Nutrition/Hydration-ADULT  Goal: Nutrient/Hydration intake appropriate for improving, restoring or maintaining nutritional needs  Description  Monitor and assess patient's nutrition/hydration status for malnutrition (ex- brittle hair, bruises, dry skin, pale skin and conjunctiva, muscle wasting, smooth red tongue, and disorientation)  Collaborate with interdisciplinary team and initiate plan and interventions as ordered  Monitor patient's weight and dietary intake as ordered or per policy  Utilize nutrition screening tool and intervene per policy  Determine patient's food preferences and provide high-protein, high-caloric foods as appropriate       INTERVENTIONS:  - Monitor oral intake, urinary output, labs, and treatment plans  - Assess nutrition and hydration status and recommend course of action  - Evaluate amount of meals eaten  - Assist patient with eating if necessary   - Allow adequate time for meals  - Recommend/ encourage appropriate diets, oral nutritional supplements, and vitamin/mineral supplements  - Order, calculate, and assess calorie counts as needed  - Recommend, monitor, and adjust tube feedings and TPN/PPN based on assessed needs  - Assess need for intravenous fluids  - Provide specific nutrition/hydration education as appropriate  - Include patient/family/caregiver in decisions related to nutrition  Outcome: Progressing     Problem: PAIN - ADULT  Goal: Verbalizes/displays adequate comfort level or baseline comfort level  Description  Interventions:  - Encourage patient to monitor pain and request assistance  - Assess pain using appropriate pain scale  - Administer analgesics based on type and severity of pain and evaluate response  - Implement non-pharmacological measures as appropriate and evaluate response  - Consider cultural and social influences on pain and pain management  - Notify physician/advanced practitioner if interventions unsuccessful or patient reports new pain  Outcome: Progressing     Problem: INFECTION - ADULT  Goal: Absence or prevention of progression during hospitalization  Description  INTERVENTIONS:  - Assess and monitor for signs and symptoms of infection  - Monitor lab/diagnostic results  - Monitor all insertion sites, i e  indwelling lines, tubes, and drains  - Monitor endotracheal (as able) and nasal secretions for changes in amount and color  - [place] appropriate cooling/warming therapies per order  - Administer medications as ordered  - Instruct and encourage patient and family to use good hand hygiene technique  - Identify and instruct in appropriate isolation precautions for identified infection/condition  Outcome: Progressing  Goal: Absence of fever/infection during neutropenic period  Description  INTERVENTIONS:  - Monitor WBC  - Implement neutropenic guidelines  Outcome: Progressing     Problem: SAFETY ADULT  Goal: Maintain or return to baseline ADL function  Description  INTERVENTIONS:  -  Assess patient's ability to carry out ADLs; assess patient's baseline for ADL function and identify physical deficits which impact ability to perform ADLs (bathing, care of mouth/teeth, toileting, grooming, dressing, etc )  - Assess/evaluate cause of self-care deficits   - Assess range of motion  - Assess patient's mobility; develop plan if impaired  - Assess patient's need for assistive devices and provide as appropriate  - Encourage maximum independence but intervene and supervise when necessary  ¯ Involve family in performance of ADLs  ¯ Assess for home care needs following discharge   ¯ Request OT consult to assist with ADL evaluation and planning for discharge  ¯ Provide patient education as appropriate  Outcome: Progressing  Goal: Maintain or return mobility status to optimal level  Description  INTERVENTIONS:  - Assess patient's baseline mobility status (ambulation, transfers, stairs, etc )    - Identify cognitive and physical deficits and behaviors that affect mobility  - Identify mobility aids required to assist with transfers and/or ambulation (gait belt, sit-to-stand, lift, walker, cane, etc )  - [place] fall precautions as indicated by assessment  - Record patient progress and toleration of activity level on Mobility SBAR; progress patient to next Phase/Stage  - Instruct patient to call for assistance with activity based on assessment  - Request Rehabilitation consult to assist with strengthening/weightbearing, etc   Outcome: Progressing     Problem: DISCHARGE PLANNING  Goal: Discharge to home or other facility with appropriate resources  Description  INTERVENTIONS:  - Identify barriers to discharge w/patient and caregiver  - Arrange for needed discharge resources and transportation as appropriate  - Identify discharge learning needs (meds, wound care, etc )  - Arrange for interpretive services to assist at discharge as needed  - Refer to Case Management Department for coordinating discharge planning if the patient needs post-hospital services based on physician/advanced practitioner order or complex needs related to functional status, cognitive ability, or social support system  Outcome: Progressing     Problem: Knowledge Deficit  Goal: Patient/family/caregiver demonstrates understanding of disease process, treatment plan, medications, and discharge instructions  Description  Complete learning assessment and assess knowledge base    Interventions:  - Provide teaching at level of understanding  - Provide teaching via preferred learning methods  Outcome: Progressing

## 2019-02-17 LAB
BACTERIA WND AEROBE CULT: ABNORMAL
GRAM STN SPEC: ABNORMAL

## 2019-02-18 ENCOUNTER — OFFICE VISIT (OUTPATIENT)
Dept: SPEECH THERAPY | Facility: CLINIC | Age: 59
End: 2019-02-18
Payer: COMMERCIAL

## 2019-02-18 ENCOUNTER — TRANSITIONAL CARE MANAGEMENT (OUTPATIENT)
Dept: INTERNAL MEDICINE CLINIC | Facility: CLINIC | Age: 59
End: 2019-02-18

## 2019-02-18 ENCOUNTER — OFFICE VISIT (OUTPATIENT)
Dept: PHYSICAL THERAPY | Facility: CLINIC | Age: 59
End: 2019-02-18
Payer: COMMERCIAL

## 2019-02-18 DIAGNOSIS — I63.9 CEREBROVASCULAR ACCIDENT (CVA), UNSPECIFIED MECHANISM (HCC): Primary | ICD-10-CM

## 2019-02-18 DIAGNOSIS — R29.90 STROKE-LIKE SYMPTOMS: ICD-10-CM

## 2019-02-18 PROCEDURE — 92507 TX SP LANG VOICE COMM INDIV: CPT | Performed by: SPEECH-LANGUAGE PATHOLOGIST

## 2019-02-18 PROCEDURE — G9164 LANG EXPRESS D/C STATUS: HCPCS | Performed by: SPEECH-LANGUAGE PATHOLOGIST

## 2019-02-18 PROCEDURE — 97140 MANUAL THERAPY 1/> REGIONS: CPT | Performed by: PHYSICAL THERAPIST

## 2019-02-18 PROCEDURE — G9163 LANG EXPRESS GOAL STATUS: HCPCS | Performed by: SPEECH-LANGUAGE PATHOLOGIST

## 2019-02-18 PROCEDURE — 97110 THERAPEUTIC EXERCISES: CPT | Performed by: PHYSICAL THERAPIST

## 2019-02-18 NOTE — PROGRESS NOTES
Left message for patient to call back  Please ask if having any current sx and schedule TCM appt  TCM started

## 2019-02-18 NOTE — PROGRESS NOTES
Daily Note     Today's date: 2019  Patient name: Rolando Bowers  : 1960  MRN: [id number]  Referring provider: Zander Jackson MD  Dx:   Encounter Diagnosis     ICD-10-CM    1  Cerebrovascular accident (CVA), unspecified mechanism ([facility] ) [address] 9                 Subjective: Patient reports he went to ER due to ongoing L foot pain and redness  Observed redness and a black spot on L pinky toe  Pt reports he is going to see PCP this week to show her his foot  Pt reports neck pain is 5/10 at end of session (was 6/10 at start of session)  Pt requested to finish session early today since his wife was already finished with her PT session  Finished 15 min earlier than planned  Objective: See treatment diary below   cervical spine x 15 min (pre session, pt arrived at 1530)  STM cervical paraspinals, upper trap, scalenes, SCM  IASTM cervical paraspinals, upper trap, scalenes, SCM  Cervical PROM rotation and sidebending   Cervical AROM rotation and sidebending 10 reps each  SNAGs for rotation 3 reps 30 sec hold   SNAGs for extension 3 reps 30 sec hold    Assessment: Patient was able to tolerate treatment session well today  Pt reports neck pain has been getting better and noted improved tolerance to TRP and STM today  Improvements in AROM noted  Pt declined to use Nustep today due to ongoing L foot pain; pt is following up with PCP this week  Pt will continue to benefit from skilled outpatient PT services to improve his balance and mobility to maximize his function  Plan: Continue per plan of care  Continue to focus on balance and gait once neck pain is resolved and once pt can tolerate more WB-ing activities due to L foot pain

## 2019-02-18 NOTE — PROGRESS NOTES
Daily Speech Treatment Note/Discharge Summary    Today's date: 2019  Patients name: Akash Woodall  : 1960  MRN: [id number]  Safety measures: N/A  Referring provider: Marietta Zamudio MD    Primary Diagnosis/Billing code: G90 2      Visit Tracking:  -Referring provider: Epic  -Billing guidelines: AMA  -Visit #   -CBC  no secondary  (no authorization required)  -RE due 2019  Pain: 8/10, patient reports that this is secondary to broken toes  Subjective/Behavioral:  -Patient reports that he is well on this date  Objective/Assessment:  -Patient did not have HEP from previous session  Short-term goals:  Goal 1: Patient will be educated on the use of internal and external memory aids and compensatory strategies with 80% accuracy to facilitate increased recall of routine, personal information, and recent events, to be achieved in 4-6 weeks  - Partially met  --See data under goal 6       Goal 2: Patient will demonstrate functional use of external memory across 3 sessions with 80% accuracy to facilitate carryover of strategies in functional living environment, to be achieved in 4-6 weeks  - Partially met  --Did not target in this session       Goal 3: Patient will complete auditory immediate and short term memory tasks to 80% accuracy to facilitate increased ability to retell narratives and recall information within functional living environment, to be achieved in 4-6 weeks  - partially met  --Did not target in this session       Goal 4: Patient will complete thought organization tasks (e g , sequencing, deduction puzzles, etc ) with 80% accuracy to facilitate increased executive functioning skills, to be achieved in 4-6 weeks  - Partially met  --Patient was visually and verbally presented with a group of three to four words  He was then asked to rearrange each group of words so their meanings progresses in degree or occurrence   Task completed with 40% acc, increased to 66% acc given verbal prompt from clinician  Results from prior session recorded here  Goal 5: Patient will complete reading comprehension tasks (e g , answering questions, following complex written directions, etc ) to 80% accuracy to facilitate carryover of comprehension of functional reading materials, to be achieved in 4-6 weeks  - Partially met  --Did not target in this session       Goal 6: Patient will complete word generation tasks (e g , analogies, category matrices, etc ) with 80% accuracy using word finding strategies to facilitate improved word retrieval skills, to be achieved in 4-6 weeks  - Partially met  --Brooke Comment completed concrete categorization with 78% accuracy       Goal 7:Patient will name up to 5 features to describe a person/place/thing with 80% accuracy to facilitate improved utilization of circumlocution strategy, to be achieved in 4-6 weeks   - partially met  --Did not target in this session       Goal 8: Patient will perform oral motor and diadochokinetic speech rate exercises with > 90% accuracy to facilitate increased speech intelligibility, to be achieved in 4-6 weeks  - partially met  --Did not target in this session  Status at Discharge  Brooke Comment displays functional expressive and receptive language skills  Expressively, Brooke Comment is able to verbally express basic to mid level wants and needs without overt word finding errors  Word finding errors are apparent with attempted expression of complex ideas  Receptively, Brooke Comment displays fair to good comprehension for most functional tasks  Additionally, Brooke Comment readily accepts re-direction to task if he initially fails to grasp concepts and procedures  Brooke Comment displays fair to good use of calender to help with recall of upcoming events but needs assist to maximize use of written memory/organizational aids  Discussed discharge with wife post session, who agrees with discharge plan  Recommend discharge at this time

## 2019-02-20 ENCOUNTER — APPOINTMENT (OUTPATIENT)
Dept: SPEECH THERAPY | Facility: CLINIC | Age: 59
End: 2019-02-20
Payer: COMMERCIAL

## 2019-02-20 ENCOUNTER — OFFICE VISIT (OUTPATIENT)
Dept: OCCUPATIONAL THERAPY | Facility: CLINIC | Age: 59
End: 2019-02-20
Payer: COMMERCIAL

## 2019-02-20 ENCOUNTER — APPOINTMENT (OUTPATIENT)
Dept: PHYSICAL THERAPY | Facility: CLINIC | Age: 59
End: 2019-02-20
Payer: COMMERCIAL

## 2019-02-20 DIAGNOSIS — G45.9 TIA (TRANSIENT ISCHEMIC ATTACK): ICD-10-CM

## 2019-02-20 DIAGNOSIS — M25.531 RIGHT WRIST PAIN: Primary | ICD-10-CM

## 2019-02-20 PROCEDURE — 97110 THERAPEUTIC EXERCISES: CPT

## 2019-02-20 PROCEDURE — 97140 MANUAL THERAPY 1/> REGIONS: CPT

## 2019-02-20 NOTE — PROGRESS NOTES
Daily Note     Today's date: 2019  Patient name: Milly Reid  : 1960  MRN: [id number]  Referring provider: Ricky Resendiz DO  Dx:   Encounter Diagnosis     ICD-10-CM    1  Right wrist pain M25 531    2  TIA (transient ischemic attack) G45 9                   Subjective: 5/10 right forearm pain      Objective:   Completed Hot pack for warm up, manual therapy, exercises and activities to increase ROM, strength, coordination and function  See treatment diary below-  Status remain the same    Precautions TIA    Specialty Daily Treatment Diary     Manual  19       graston To forearm                                           Exercise Diary  19       Clothes pegs Firm 2 x 24       dice 3 x 12                                                                                                                                                           Modalities 19       Moist heat X ~ 5 mins                             Assessment: Tolerated treatment fair  Patient would benefit from continued OT- difficulty with following directions      Plan: Continue per plan of care

## 2019-02-21 ENCOUNTER — APPOINTMENT (OUTPATIENT)
Dept: OCCUPATIONAL THERAPY | Facility: CLINIC | Age: 59
End: 2019-02-21
Payer: COMMERCIAL

## 2019-02-25 ENCOUNTER — OFFICE VISIT (OUTPATIENT)
Dept: OCCUPATIONAL THERAPY | Facility: CLINIC | Age: 59
End: 2019-02-25
Payer: COMMERCIAL

## 2019-02-25 ENCOUNTER — OFFICE VISIT (OUTPATIENT)
Dept: PHYSICAL THERAPY | Facility: CLINIC | Age: 59
End: 2019-02-25
Payer: COMMERCIAL

## 2019-02-25 DIAGNOSIS — M25.531 RIGHT WRIST PAIN: Primary | ICD-10-CM

## 2019-02-25 DIAGNOSIS — G45.9 TIA (TRANSIENT ISCHEMIC ATTACK): ICD-10-CM

## 2019-02-25 DIAGNOSIS — I63.9 CEREBROVASCULAR ACCIDENT (CVA), UNSPECIFIED MECHANISM (HCC): Primary | ICD-10-CM

## 2019-02-25 PROCEDURE — 97110 THERAPEUTIC EXERCISES: CPT

## 2019-02-25 PROCEDURE — 97140 MANUAL THERAPY 1/> REGIONS: CPT | Performed by: PHYSICAL THERAPIST

## 2019-02-25 PROCEDURE — 97140 MANUAL THERAPY 1/> REGIONS: CPT

## 2019-02-25 PROCEDURE — 97112 NEUROMUSCULAR REEDUCATION: CPT | Performed by: PHYSICAL THERAPIST

## 2019-02-25 PROCEDURE — 97530 THERAPEUTIC ACTIVITIES: CPT

## 2019-02-25 NOTE — PROGRESS NOTES
Daily Note     Today's date: 2019  Patient name: Efe Haile  : 1960  MRN: [id number]  Referring provider: Zahraa Bowles DO  Dx:   Encounter Diagnosis     ICD-10-CM    1  Right wrist pain M25 531    2  TIA (transient ischemic attack) G45 9                   Subjective: 2-3/10 right forearm pain      Objective:   Completed Hot pack for warm up, manual therapy, exercises and activities to increase ROM, strength, coordination and function  See treatment diary below-  Status remain the same    Precautions TIA    Specialty Daily Treatment Diary     Manual  19      graston To forearm To forearm                                          Exercise Diary  19      Clothes pegs Firm 2 x 24 Firm 2 x 24      dice 3 x 12       Gripper   Y3 x 15      Small pegs        Wrist weight   Flex 3lb x 12  Ext 3lb x 12  Dev 3lb x 12      Pinching clothes peg with marble          X 15                                                                                                                  Modalities 19      Moist heat X ~ 5 mins X ~ 5 mins                            Assessment: Tolerated treatment fair  Patient would benefit from continued OT- difficulty with following directions      Plan: Continue per plan of care

## 2019-02-26 ENCOUNTER — OFFICE VISIT (OUTPATIENT)
Dept: OCCUPATIONAL THERAPY | Facility: CLINIC | Age: 59
End: 2019-02-26
Payer: COMMERCIAL

## 2019-02-26 DIAGNOSIS — Z86.73 HISTORY OF STROKE: Primary | ICD-10-CM

## 2019-02-26 PROCEDURE — 97165 OT EVAL LOW COMPLEX 30 MIN: CPT

## 2019-02-26 PROCEDURE — 96125 COGNITIVE TEST BY HC PRO: CPT

## 2019-02-26 NOTE — PROGRESS NOTES
Occupational Therapy Fit to Drive Evaluation:  Today's Date: 2019  Patient Name: Alba Sanchez  : 1960  MRN: [id number]  Referring Provider: Fady Burrell MD  Dx: History of stroke [Z86 73]    Active Problem List:   Patient Active Problem List   Diagnosis    Type 2 diabetes mellitus with hyperglycemia, with long-term current use of insulin ([facility] )    Mixed hyperlipidemia    Benign hypertension with CKD (chronic kidney disease) stage III ([facility] )    Cerebrovascular accident (CVA) due to thrombosis of left middle cerebral artery (HCC)    Cognitive impairment    Elevated alkaline phosphatase level    CKD (chronic kidney disease)    Diabetic macular edema (HCC)    GERD (gastroesophageal reflux disease)    Cirrhosis ([facility] )    Diabetic polyneuropathy associated with type 2 diabetes mellitus (HCC)    Other constipation    Abnormal EEG    History of stroke    TIA (transient ischemic attack)    Stroke-like symptoms, with right-sided weakness    Hypoglycemia, transient episode    Cellulitis     Past Medical Hx:   Past Medical History:   Diagnosis Date    Diabetes mellitus ([facility] )     Hypercholesteremia     Hyperlipidemia     Hypertension     Neuropathy     Obesity     [facility]     last assessed 16    PVC's (premature ventricular contractions)     sees cardiology Dr Vivian camargo    Stroke [facility])     last weeof 2018 [facility]     Past Surgical Hx:   Past Surgical History:   Procedure Laterality Date    ABDOMINAL SURGERY      CHOLECYSTECTOMY      Percutaneous    OTHER SURGICAL HISTORY      "stimulator to control bowel movements"    MD ESOPHAGOGASTRODUODENOSCOPY TRANSORAL DIAGNOSTIC N/A 2016    Procedure: ESOPHAGOGASTRODUODENOSCOPY (EGD); Surgeon: Mercedez David MD;  [facility];   Service: Gastroenterology    MD LAP,CHOLECYSTECTOMY N/A 2016    Procedure: LAPAROSCOPIC CHOLECYSTECTOMY ;  Surgeon: Lay Huizar DO;  [facility];  Service: General    ROTATOR CUFF REPAIR Right     TOE AMPUTATION Right 10/28/2016    Procedure: 3RD TOE AMPUTATION ;  Surgeon: Johanny Israel DPM;  [facility];  Service:       Pain Levels:  Restin    With Activity:  0    OT low complexity eval: 8738-3179  OT DCAT, Reaction Times Trials, OPTEC Vision Screen, and LACLS: 0496-6555    Subjective/Patient Goal: "To pass so I can get back to driving I'm doing better"    History of Present Illness:  Pt is a pleasant, active, employed full time 62 y  o  male seen for OT eval s/p referred to [facility] s/p adm to [facility] 2018-2018 s/p R facial droop, stroke alert called, administered TPA in ED, managed in ICU, no ICH noted, MRIB + Recent ischemia, without hemorrhage in the left posterior perforating substance affecting hypothalamus, anterolateral thalamus, and genu of the left internal capsule, f/u CTB: Expected evolution of the known ischemic infarct in the left basal ganglion since prior CT   No hemorrhagic conversion   No new ischemic infarcts are seen   No new intraparenchymal hemorrhages are seen   No hydrocephalus, adm to [facility] 2018-2018, now referred for outpt OT/PT, dx'd w/ L BG CVA, CKD, diabetic macular edema, elevated alkaline phosphatase level, cognitive impairment, neuropathy, HTN, HLD, comorbidities as listed above       Pt seen for OT re-evaluation/progress note/status update on 2* 10/15/208 2* admitted to [address] [place], [place] [address] - diagnosed w/ HTN, HLD, cognitive impairment and CKD III       Now seen for additional OT re-evaluation/progress note/status update on 2018 2* adm to [facility] from 10/28-10/29/2018 w/ R sided weakness, stroke like s/s, ultimately dx'd w/ r/o TIA/CVA, DMII, CKDIII, prior CVA, HLD, diabetic polyneuropathy, MRIB (-) No acute intracranial abnormality   No restricted diffusion to suggest acute ischemia   Old anterior left thalamic/hypothalamic lacunar infarct   Old left pontine lacunar infarct  Pt ultimately placed on hold 2* LE wound, eventually d/c'd 2* did not return w/in 30 days  Expressed interest in returning to driving, now referred for OT for FTD  Currently re-engaged in outpt OT/PT/SLP @ [facility]  Lifestyle Performance Model:  Autonomy: Pt was I w/  I/ADLS, drove, & required no use of DME PTA, since CVA was min A for ADLS, A for IADLS, has not returned to driving and required use of RW,   Reciprocal Relationships: Supportive wife and 4 children living 16, 22, 32, and 30  Service to Others: Pt is retired , also worked for [employer]  Intrinsic Gratification: Enjoys nothing outside of working going out to eat, going shopping, Mormonism on Sunday  Home Setup: Pt lives in a 1 story handicapped accessible apartment w/ first floor setup w/ 0 RAFAT in [place] [email or web address]     Driving History:  Vehicle Type:   X Car,     Truck,     Motorcycle  Visual History:   X Glasses,     Contacts   No Cataracts,     No Glaucoma,     No [place],     No [place]   Appointment: every 3 weeks   Communication Status:   X English,     Croatian  Driving History:   No GPS use,     No History of getting lost,    No Tickets,     No DUI  License Status:   X Active,     Inactive  Last Drove:   1 week ago  Last Accident:   N/A  Initial License Date:   18  Driving Goals:   X Local     X Highway  Car Transfer:   Independent    Objective  Functional/Cognitive Impairments:  1    DCAT: (see attached report for further details) Pt scoring an 73% likelihood on failing on road     Fit,     X Unfit  Recommend On Road Assessment:   X Yes,     No  Recommend to Mobility [employer]   Yes,     X No,     Due to: N/A  2   OPTEC vision screen: (see attached)   Contrast sensitivity: impaired   Far acuity: 20/30    Near acuity: 20/30   Color perception: impaired   Lateral phoria: exophoria @ diopter 10   Depth perception far: impaired   Sign recognition: able to ID 9/12 road signs correctly   Color recognition: intact  3  Reaction time trials: see attached  trial 1) 0 989, trial 2) 0 933,  trial 3) 0 797, average of 3 trials: 0 906, Falling within the 25th %ile for reaction time  4   Rapid Pace Walk Test:  12 6 seconds: Those with greater than 9 seconds had a 3 fold increase risk of being in an at fault auto accident  5   Physical findings:  cervical rotation R 80, L 80; UE and LE AROM full with WFL/WNL 4/5 strength, R handed  6  Probability of creating a hazardous situation on specialized on-road test: 50%; see attached report for further details  7   Pt engaged in Neri Wagner Cognitive Level Screening (LACLS) and scored the followin 2 Administered Jessica Olmstead Cognitive Level Screen (ACLS)  Pt scored 4 2/6 0 indicating 24 Hour supervision is recommended to remove dangerous objects outside the visual field and to solve problems due to minor changes in the environment  Behavior:  Recognizes errors  Identifies problems  Asks for Day/Date  Sequences one activity at a time  Processing speed is slow and may take extra time to complete tasks  Memorization will be very slow  Requires long term repetitive training for new tasks  Keep directions short and concise  Grooming:  Initiates and completes with supplies from familiar accessible locations  Stops and asks for help when an error is made  Expect cuts with use of straight razor  Check every few minutes if left alone  Allow ample time for completion of tasks  Dressing:  Selects clothing items based on striking features like color  May choose to wear a favorite item all the time  May argue with suggestions to change selections  Bathing:  Recognizes need for a bath and may ask if time is appropriate  Collects supplies from a visible location  Follows routine  May miss small hidden areas  Recognizes problem like lack of soap and asks for help    Remove hazards from proximity to bath (ie: electrical appliances)  Walking/exercising:  Ambulates to a familiar location ½ to 1 mile away  May not vary route  May fail to attend to activity or noises outside visual field  May ask for help if lost   May be able to learn a graded exercise program   May become anxious in high stimulus environments (malls, airports, casinos)  May resist going to certain places  Eating:  Initiates coming to table at routine times  Uses utensils  Recognizes errors and asks for help  Attempts to comply with social standards  May have trouble waiting for others  Assist with handling hot foods/liquids  Monitor compliance with special diet  Toileting:  Recognizes difficulties that interfere with toileting routine and asks for help  May be able to report change in bowel or bladder habits  May take much longer than average to complete toileting  Medications:  Initiates taking familiar dose at regular time of day  May not be able to open container and may have incorrect ideas of effects that lead to noncompliance  May not seek assist for problems encountered  Supervise to ensure compliance  Use of adaptive equipment:  Needs help with more complex equipment  May need assistance with fasteners that require fine motor skills  Does not understand the potential hazards of incorrect use  May forget to use equipment  Housekeeping:   Initiates and completes a routine of housekeeping activities  May be able to set priorities but may become fixated on a priority  Cleans, polishes and sweeps one feature at a time  Check for quality of cleaning results  Food preparation:  Does not plan for long term food needs  May go to store repeatedly whenever food is desired  May search cabinet for particular food item and ask for help when items are not found  May prepare a simple meal but may not recognize problems  Store all undesirable equipment away from view  Do not leave alone when using dangerous tools/equipment    Spending money: May be able to set a priority for an expense that is highly valued  May become fixated on item  May resist help  Shopping:  May go to familiar shops but does not make a list or compare prices  Looks in familiar locations for an item  May ask for help when not found  May insist on purchasing item that is unrealistic or impractical   May resist help  May be anxious in high stimulus environments  Laundry:  May sort laundry by color or other simple striking cues  May view as a priority and initiate regularly  Recognizes errors like too much soap but cannot offer solutions  Traveling:  May go through actions slowly  Can sit unaccompanied up to 1 hour on a bus or train with landmarks as a guide when to get off  Asks for assistance if lost, does not alter familiar routes  May become anxious in high stimulus environments (bus, airport terminals)  Telephone:  Dials a new number written down by checking it 1 digit at a time  Writes down information very slowly  Does not consider a persons schedule or time of day when calling  Needs assistance to make calls from unfamiliar telephones  May call emergency or familiar numbers excessively  Driving:  Should NOT operate a motor vehicle  8  Recommendations:  Based on OPTEC vision screen, recommend f/u w/ ophthalmology  Where available, a specialized on-road evaluation through [facility] should be considered to further assess Pts safe driving abilities in community environment  MD to discuss with Pt  Assessment/Plan  Occupational Therapy Skilled Analysis Assessment and Plan of Care:  Pt is a 62 y o  male referred to Occupational Therapy for Fitness to Drive Evaluation to assess pts cognitive, visual, and motor abilities to drive safely in community environment  Pt requires overall mod I for ADLs/self care and mod I for fx'l mobility w/o DME   Pt is currently demonstrating the following occupational deficits: limited 2* decreased endurance/activity tolerance, generalized weakness, deconditioning, SOB, CÁRDENAS, fatigue, fall risk, impaired balance, impulsive, decreased problem solving and sequencing, delayed processing, decreased auditory processing, poor insight judgement and safety awareness into deficits, mild RUE residual hemiparesis w/ impaired FMC/FMS/GMC/GMS, LLE wound w/ Darco wedge (WBAT in wedge), poor STM/immediate delayed recall, working memory  Pt scoring cognitively at 73% predicted probability of failing a specialized on-road test   This indicated abilities for driving are affectedresults ranging from 50% to 72% indicated a greater probability of failing an on-road evaluation than passing one  Individuals scoring in this range have a much higher than average risk of performing a dangerous error on the road that would affect other drivers  Pt scoring cognitively at a 50% probability of creating a hazardous situation on a specialized road test  Below average scoring was noted in the following areas:initialization and reactions, track and process visual events, ability to encode, retrieve, and/or respond to stimuli, judgement of spatial relations, guided movement control, ability to quickly respond to complex information, impulse control  Where available, a specialized on-road evaluation through [facility] should be considered to further assess Pts safe driving abilities in community environment  MD to discuss with Pt  Based on the aforementioned OT evaluation, functional performance deficits, and assessments, pt has been identified as a low complexity evaluation  No further skillable OT needs indicated as pt referred for Fitness to Drive Evaluation only per consult script, D/C from OT caseload, D/C OT  MD to discuss results w/ pt  INTERVENTION COMMENTS:  Diagnosis: History of stroke [Z86 73]  Precautions: fall risk  FOTO: N/A for FTD Evaluations  Insurance: [address] [place] [address] [address] [[id number]]    Thank you for the consult!   Please call if you have any questions: [phone], OTD, OTR/L, C-BHARATI, CSRS  Director of Outpatient Neuro Occupational Therapy

## 2019-02-27 ENCOUNTER — TELEPHONE (OUTPATIENT)
Dept: NEUROLOGY | Facility: CLINIC | Age: 59
End: 2019-02-27

## 2019-02-27 ENCOUNTER — OFFICE VISIT (OUTPATIENT)
Dept: PHYSICAL THERAPY | Facility: CLINIC | Age: 59
End: 2019-02-27
Payer: COMMERCIAL

## 2019-02-27 ENCOUNTER — HOSPITAL ENCOUNTER (EMERGENCY)
Facility: HOSPITAL | Age: 59
Discharge: HOME | End: 2019-02-27
Attending: EMERGENCY MEDICINE | Admitting: EMERGENCY MEDICINE
Payer: COMMERCIAL

## 2019-02-27 ENCOUNTER — OFFICE VISIT (OUTPATIENT)
Dept: OCCUPATIONAL THERAPY | Facility: CLINIC | Age: 59
End: 2019-02-27
Payer: COMMERCIAL

## 2019-02-27 VITALS
OXYGEN SATURATION: 98 % | HEIGHT: 70 IN | RESPIRATION RATE: 18 BRPM | BODY MASS INDEX: 34.4 KG/M2 | TEMPERATURE: 98 F | SYSTOLIC BLOOD PRESSURE: 153 MMHG | DIASTOLIC BLOOD PRESSURE: 79 MMHG | WEIGHT: 240.3 LBS | HEART RATE: 80 BPM

## 2019-02-27 DIAGNOSIS — I63.9 CEREBROVASCULAR ACCIDENT (CVA), UNSPECIFIED MECHANISM (HCC): Primary | ICD-10-CM

## 2019-02-27 DIAGNOSIS — L97.528 DIABETIC ULCER OF TOE OF LEFT FOOT ASSOCIATED WITH TYPE 2 DIABETES MELLITUS, WITH OTHER ULCER SEVERITY (HCC): Primary | ICD-10-CM

## 2019-02-27 DIAGNOSIS — Z86.73 HISTORY OF STROKE: ICD-10-CM

## 2019-02-27 DIAGNOSIS — E11.621 DIABETIC ULCER OF TOE OF LEFT FOOT ASSOCIATED WITH TYPE 2 DIABETES MELLITUS, WITH OTHER ULCER SEVERITY (HCC): Primary | ICD-10-CM

## 2019-02-27 DIAGNOSIS — M25.531 RIGHT WRIST PAIN: Primary | ICD-10-CM

## 2019-02-27 LAB
ANION GAP SERPL CALCULATED.3IONS-SCNC: 9 MMOL/L (ref 4–13)
BASOPHILS # BLD AUTO: 0.04 THOUSANDS/ΜL (ref 0–0.1)
BASOPHILS NFR BLD AUTO: 1 % (ref 0–1)
BUN SERPL-MCNC: 41 MG/DL (ref 5–25)
CALCIUM SERPL-MCNC: 8.5 MG/DL (ref 8.3–10.1)
CHLORIDE SERPL-SCNC: 105 MMOL/L (ref 100–108)
CO2 SERPL-SCNC: 24 MMOL/L (ref 21–32)
CREAT SERPL-MCNC: 2.42 MG/DL (ref 0.6–1.3)
EOSINOPHIL # BLD AUTO: 0.18 THOUSAND/ΜL (ref 0–0.61)
EOSINOPHIL NFR BLD AUTO: 3 % (ref 0–6)
ERYTHROCYTE [DISTWIDTH] IN BLOOD BY AUTOMATED COUNT: 13.9 % (ref 11.6–15.1)
GFR SERPL CREATININE-BSD FRML MDRD: 28 ML/MIN/1.73SQ M
GLUCOSE SERPL-MCNC: 301 MG/DL (ref 65–140)
HCT VFR BLD AUTO: 36.8 % (ref 36.5–49.3)
HGB BLD-MCNC: 12.2 G/DL (ref 12–17)
IMM GRANULOCYTES # BLD AUTO: 0.05 THOUSAND/UL (ref 0–0.2)
IMM GRANULOCYTES NFR BLD AUTO: 1 % (ref 0–2)
LYMPHOCYTES # BLD AUTO: 1.78 THOUSANDS/ΜL (ref 0.6–4.47)
LYMPHOCYTES NFR BLD AUTO: 25 % (ref 14–44)
MCH RBC QN AUTO: 28.9 PG (ref 26.8–34.3)
MCHC RBC AUTO-ENTMCNC: 33.2 G/DL (ref 31.4–37.4)
MCV RBC AUTO: 87 FL (ref 82–98)
MONOCYTES # BLD AUTO: 0.73 THOUSAND/ΜL (ref 0.17–1.22)
MONOCYTES NFR BLD AUTO: 10 % (ref 4–12)
NEUTROPHILS # BLD AUTO: 4.29 THOUSANDS/ΜL (ref 1.85–7.62)
NEUTS SEG NFR BLD AUTO: 60 % (ref 43–75)
NRBC BLD AUTO-RTO: 0 /100 WBCS
PLATELET # BLD AUTO: 162 THOUSANDS/UL (ref 149–390)
PMV BLD AUTO: 10.7 FL (ref 8.9–12.7)
POTASSIUM SERPL-SCNC: 4.5 MMOL/L (ref 3.5–5.3)
RBC # BLD AUTO: 4.22 MILLION/UL (ref 3.88–5.62)
SODIUM SERPL-SCNC: 138 MMOL/L (ref 136–145)
WBC # BLD AUTO: 7.07 THOUSAND/UL (ref 4.31–10.16)

## 2019-02-27 PROCEDURE — 97140 MANUAL THERAPY 1/> REGIONS: CPT

## 2019-02-27 PROCEDURE — 80048 BASIC METABOLIC PNL TOTAL CA: CPT | Performed by: EMERGENCY MEDICINE

## 2019-02-27 PROCEDURE — 97140 MANUAL THERAPY 1/> REGIONS: CPT | Performed by: PHYSICAL THERAPIST

## 2019-02-27 PROCEDURE — 99283 EMERGENCY DEPT VISIT LOW MDM: CPT

## 2019-02-27 PROCEDURE — 36415 COLL VENOUS BLD VENIPUNCTURE: CPT | Performed by: EMERGENCY MEDICINE

## 2019-02-27 PROCEDURE — 97110 THERAPEUTIC EXERCISES: CPT

## 2019-02-27 PROCEDURE — 85025 COMPLETE CBC W/AUTO DIFF WBC: CPT | Performed by: EMERGENCY MEDICINE

## 2019-02-27 PROCEDURE — 97530 THERAPEUTIC ACTIVITIES: CPT

## 2019-02-27 RX ORDER — CIPROFLOXACIN 500 MG/1
500 TABLET, FILM COATED ORAL ONCE
Status: COMPLETED | OUTPATIENT
Start: 2019-02-27 | End: 2019-02-27

## 2019-02-27 RX ORDER — METRONIDAZOLE 500 MG/1
500 TABLET ORAL ONCE
Status: COMPLETED | OUTPATIENT
Start: 2019-02-27 | End: 2019-02-27

## 2019-02-27 RX ORDER — CIPROFLOXACIN 500 MG/5ML
500 KIT ORAL ONCE
Status: DISCONTINUED | OUTPATIENT
Start: 2019-02-27 | End: 2019-02-27

## 2019-02-27 RX ADMIN — METRONIDAZOLE 500 MG: 500 TABLET ORAL at 18:00

## 2019-02-27 RX ADMIN — CIPROFLOXACIN HYDROCHLORIDE 500 MG: 500 TABLET, FILM COATED ORAL at 18:00

## 2019-02-27 NOTE — PROGRESS NOTES
Daily Note     Today's date: 2019  Patient name: Stefanie Cordero  : 1960  MRN: [id number]  Referring provider: Carmelita Dooley MD  Dx:   Encounter Diagnosis     ICD-10-CM    1  Cerebrovascular accident (CVA), unspecified mechanism ([facility] ) I63 9      Start Time: 1600  Stop Time: 1615  Total time in clinic (min): 15 minutes     Subjective: Pt reports that he will that he is in a lot of pain and will visit the foot doctor tomorrow  Objective: See treatment diary below  -TRP B/L SCM and R UT , suboccipital release, x12 minutes   -Examined L foot; noted black eschar on lateral side of 5th metatarsal and between 4th and 5th metatarsals  Assessment:  Completed 12 minutes of manual therapy with mild improvements in upper trap/LV and suboccipital hypertonicity  Noted swelling and black eschar on L foot (see above for specific location)  Held all standing exercises today and recommended to patient and his wife that they visit the ER for his foot to be examined  They verbalized agreement  Ended session early so patient and his wife could visit the ER         Plan: Continue per plan of care per patient tolerance

## 2019-02-27 NOTE — PROGRESS NOTES
Daily Note     Today's date: 2019  Patient name: Efe Haile  : 1960  MRN: [id number]  Referring provider: Zahraa Bowles DO  Dx:   Encounter Diagnosis     ICD-10-CM    1  Right wrist pain M25 531    2  History of stroke Z86 73                   Subjective: 8/10 right forearm pain patient crying after graston reduced to 6/10      Objective:   Completed Hot pack for warm up, manual therapy, exercises and activities to increase ROM, strength, coordination and function  See treatment diary below-  Status remain the same    Precautions TIA    Specialty Daily Treatment Diary     Manual  19     graston To forearm To forearm To forearm                                         Exercise Diary  19     Clothes pegs Firm 2 x 24 Firm 2 x 24 Firm 2 x 24     dice 3 x 12  3 x 12     Gripper   Y3 x 15 Y3 x 20     Small pegs        Wrist weight   Flex 3lb x 12  Ext 3lb x 12  Dev 3lb x 12 Flex 3lb x 15  Ext 2lb x 15  Dev 2lb x 15     Pinching clothes peg with marble  15                                                                                                                          Modalities 19     Moist heat X ~ 5 mins X ~ 5 mins X~ 5 mins                           Assessment: Tolerated treatment fair  Patient would benefit from continued OT- difficulty with following directions      Plan: Continue per plan of care

## 2019-02-27 NOTE — ED PROVIDER NOTES
History  Chief Complaint   Patient presents with    Toe Pain     Pt reports about a month ago breaking toes, patient reports no improvement, Redness and swelling and pain at site  Sent to r/o cellulities      Patient is a 59-year-old male with a history of diabetes who presents with a diabetic foot ulcer  Patient was hospitalized about 2 weeks ago for a diabetic foot wound and associated cellulitis  Wound culture grew E coli and Pseudomonas  He was on 2 days of IV antibiotics and discharged on Cipro and Flagyl  His wife states that he is noncompliant with his p o  Antibiotics at home  She noticed that there appeared to be more redness and swelling surrounding the ulcers  He denies fevers, chills, nausea, vomiting or other complaints  He is still able to ambulate without significant pain  He has follow-up scheduled with his podiatrist tomorrow  History provided by:  Patient and spouse  Toe Pain   Location:  Left toes  Severity:  Mild  Timing:  Constant  Progression:  Worsening  Chronicity:  Recurrent  Associated symptoms: no abdominal pain, no chest pain, no cough, no diarrhea, no fever, no headaches, no nausea, no rash, no shortness of breath, no sore throat and no vomiting        Prior to Admission Medications   Prescriptions Last Dose Informant Patient Reported? Taking? Blood Glucose Monitoring Suppl (ONE TOUCH ULTRA MINI) w/Device KIT  Spouse/Significant Other No No   Sig: by Does not apply route 3 (three) times a day   Blood Pressure Monitoring (BLOOD PRESSURE CUFF) MISC  Spouse/Significant Other No No   Sig: Use to check blood pressure before taking blood pressure medication and 1 hour after and follow instructions provided in discharge instructions based on the readings     [facility] GLUCOSE 4-6 GM-MG  Spouse/Significant Other Yes No   Sig: CHEW 3 TABLETS BY MOUTH DAILY AS NEEDED   Incontinence Supplies (MALE URINAL) MISC  Spouse/Significant Other No No   Sig: by Does not apply route daily   Insulin Syringe-Needle U-100 (B-D INS SYR ULTRAFINE  3CC/30G) 30G X 1/2" 0 3 ML MISC  Spouse/Significant Other No No   Sig: by Does not apply route 4 (four) times a day   Insulin Syringe-Needle U-100 (B-D INS SYRINGE 0 5CC/30GX1/2") 30G X 1/2" 0 5 ML MISC   No No   Sig: Inject under the skin 4 (four) times a day   LUCENTIS 0 3 MG/0 05ML  Spouse/Significant Other Yes No   ONETOUCH DELICA LANCETS 88K MISC  Spouse/Significant Other No No   Sig: by Does not apply route 3 (three) times a day   amLODIPine (NORVASC) 5 mg tablet  Spouse/Significant Other No No   Sig: Take 1 tablet (5 mg total) by mouth every 12 (twelve) hours   aspirin (ECOTRIN LOW STRENGTH) 81 mg EC tablet  Spouse/Significant Other Yes No   Sig: Take 81 mg by mouth daily Resume on    atorvastatin (LIPITOR) 80 mg tablet  Spouse/Significant Other No No   Sig: Take 1 tablet (80 mg total) by mouth daily with dinner   carvedilol (COREG) 6 25 mg tablet  Spouse/Significant Other No No   Sig: Take 1 tablet (6 25 mg total) by mouth 2 (two) times a day with meals   cholecalciferol 93521 units TABS  Spouse/Significant Other No No   Sig: Take 1 tablet (50,000 Units total) by mouth once a week   cyanocobalamin 1000 MCG tablet  Spouse/Significant Other No No   Sig: Take 1 tablet (1,000 mcg total) by mouth daily   diclofenac sodium (VOLTAREN) 1 %   No No   Sig: Apply 2 g topically 3 (three) times a day   escitalopram (LEXAPRO) 5 mg tablet  Spouse/Significant Other No No   Sig: Take 1 tablet (5 mg total) by mouth daily   famotidine (PEPCID) 20 mg tablet  Spouse/Significant Other Yes No   Sig: Take 20 mg by mouth daily Resume on    gabapentin (NEURONTIN) 250 mg/5 mL solution  Spouse/Significant Other No No   Sig: Take 12 mL (600 mg total) by mouth daily at bedtime   glucose 4 g chewable tablet  Spouse/Significant Other No No   Sig: Chew 3 tablets (12 g total) as needed for low blood sugar   glucose blood (ONE TOUCH ULTRA TEST) test strip   No No   Si each by Other route 3 (three) times a day Use as instructed   insulin aspart (NovoLOG) 100 units/mL injection   No No   Sig: Inject 8 Units under the skin 3 (three) times a day before meals plus scale  Scale - 150-200 -2 units, 201-250-4 units, 251-300 -6 units, 301-350-8 units, > 350- 10 units   Patient taking differently: Inject 12 Units under the skin 3 (three) times a day before meals plus scale  Scale - 150-200 -2 units, 201-250-4 units, 251-300 -6 units, 301-350-8 units, > 350- 10 units    insulin glargine (LANTUS) 100 units/mL subcutaneous injection  Spouse/Significant Other No No   Sig: Inject 25 Units under the skin daily   Patient taking differently: Inject 30 Units under the skin daily     lactulose 20 g/30 mL  Spouse/Significant Other No No   Sig: Take 30 mL (20 g total) by mouth daily as needed (constipation)   neomycin-bacitracin-polymyxin b (NEOSPORIN) ointment  Spouse/Significant Other Yes No   Sig: Apply 1 application topically 2 (two) times a day Apply twice per day to shin wound until fully healed  If not fully healed in 5 days contact your family doctor   Next application is the evening of 8/13   ondansetron (ZOFRAN-ODT) 4 mg disintegrating tablet  Spouse/Significant Other No No   Sig: Take 1 tablet (4 mg total) by mouth every 6 (six) hours as needed for nausea or vomiting      Facility-Administered Medications: None       Past Medical History:   Diagnosis Date    [facility] mellitus ([email or web address] )     Hypercholesteremia     Hyperlipidemia     Hypertension     Neuropathy     Obesity     [facility]     last assessed 11/4/16    PVC's (premature ventricular contractions)     sees cardiology Dr Elvia camargo    [facility])     last weeof July 2018 [address]       Past Surgical History:   Procedure Laterality Date    ABDOMINAL SURGERY      CHOLECYSTECTOMY      Percutaneous    OTHER SURGICAL HISTORY      "stimulator to control bowel movements"    [place] ESOPHAGOGASTRODUODENOSCOPY TRANSORAL DIAGNOSTIC N/A 9/27/2016    Procedure: ESOPHAGOGASTRODUODENOSCOPY (EGD); Surgeon: Essie Garcia MD;  [facility]; Service: Gastroenterology    [place] LAP,CHOLECYSTECTOMY N/A 2/29/2016    Procedure: LAPAROSCOPIC CHOLECYSTECTOMY ;  Surgeon: Tawana Calderon DO;  [facility];  Service: General    ROTATOR CUFF REPAIR Right     TOE AMPUTATION Right 10/28/2016    Procedure: 3RD TOE AMPUTATION ;  Surgeon: Leora Bansal DPM;  [facility];  Service:        Family History   Problem Relation Age of Onset    Leukemia Mother     Liver disease Mother     Lung cancer Mother         heavy smoker - 3 ppd    Heart disease Father     Liver disease Father     Multiple myeloma Sister     Breast cancer Sister     Urolithiasis Family     Alcohol abuse Neg Hx     Depression Neg Hx     Drug abuse Neg Hx     Substance Abuse Neg Hx     Mental illness Neg Hx      I have reviewed and agree with the history as documented  Social History     Tobacco Use    Smoking status: Never Smoker    Smokeless tobacco: Never Used   Substance Use Topics    Alcohol use: No    Drug use: No        Review of Systems   Constitutional: Negative for chills, diaphoresis and fever  HENT: Negative for nosebleeds, sore throat and trouble swallowing  Eyes: Negative for photophobia, pain and visual disturbance  Respiratory: Negative for cough, chest tightness and shortness of breath  Cardiovascular: Negative for chest pain, palpitations and leg swelling  Gastrointestinal: Negative for abdominal pain, constipation, diarrhea, nausea and vomiting  Endocrine: Negative for polydipsia and polyuria  Genitourinary: Negative for difficulty urinating, dysuria and hematuria  Musculoskeletal: Negative for back pain, neck pain and neck stiffness  Skin: Negative for pallor and rash  Neurological: Negative for dizziness, syncope, light-headedness and headaches     All other systems reviewed and are negative  Physical Exam  Physical Exam   Constitutional: He is oriented to person, place, and time  He appears well-developed and well-nourished  No distress  HENT:   Head: Normocephalic and atraumatic  Mouth/Throat: Oropharynx is clear and moist and mucous membranes are normal    Eyes: Pupils are equal, round, and reactive to light  EOM are normal    Neck: Normal range of motion  Neck supple  Cardiovascular: Normal rate, regular rhythm, normal heart sounds, intact distal pulses and normal pulses  Pulmonary/Chest: Effort normal and breath sounds normal  No respiratory distress  Abdominal: Soft  He exhibits no distension  There is no tenderness  There is no rigidity, no rebound and no guarding  Musculoskeletal: Normal range of motion  He exhibits no edema or tenderness  Feet:   Left Foot:   Skin Integrity: Positive for ulcer (Shallow ulceration to the left 4th toe as well as blister to the lateral 5th toe  No surrounding erythema or warmth  There is mild edema of the left foot  )  Lymphadenopathy:     He has no cervical adenopathy  Neurological: He is alert and oriented to person, place, and time  He has normal strength  No cranial nerve deficit or sensory deficit  Skin: Skin is warm and dry  Capillary refill takes less than 2 seconds  Psychiatric: He has a normal mood and affect  Nursing note and vitals reviewed        Vital Signs  ED Triage Vitals [02/27/19 1658]   Temperature Pulse Respirations Blood Pressure SpO2   98 °F (36 7 °C) 80 18 153/79 98 %      Temp Source Heart Rate Source Patient Position - Orthostatic VS BP Location FiO2 (%)   Oral Monitor Sitting Right arm --      Pain Score       8           Vitals:    02/27/19 1658   BP: 153/79   Pulse: 80   Patient Position - Orthostatic VS: Sitting       Visual Acuity      ED Medications  Medications   metroNIDAZOLE (FLAGYL) tablet 500 mg (500 mg Oral Given 2/27/19 1800)   ciprofloxacin (CIPRO) tablet 500 mg (500 mg Oral Given 2/27/19 1800)       Diagnostic Studies  Results Reviewed     Procedure Component Value Units Date/Time    Basic metabolic panel [[id number]]  (Abnormal) Collected:  02/27/19 1741    Lab Status:  Final result Specimen:  Blood from Arm, Right Updated:  02/27/19 1756     Sodium 138 mmol/L      Potassium 4 5 mmol/L      Chloride 105 mmol/L      CO2 24 mmol/L      ANION GAP 9 mmol/L      BUN 41 mg/dL      Creatinine 2 42 mg/dL      Glucose 301 mg/dL      Calcium 8 5 mg/dL      eGFR 28 ml/min/1 73sq m     Narrative:       National Kidney Disease Education Program recommendations are as follows:  GFR calculation is accurate only with a steady state creatinine  Chronic Kidney disease less than 60 ml/min/1 73 sq  meters  Kidney failure less than 15 ml/min/1 73 sq  meters      CBC and differential [[id number]] Collected:  02/27/19 1741    Lab Status:  Final result Specimen:  Blood from Arm, Right Updated:  02/27/19 1746     WBC 7 07 Thousand/uL      RBC 4 22 Million/uL      Hemoglobin 12 2 g/dL      Hematocrit 36 8 %      MCV 87 fL      MCH 28 9 pg      MCHC 33 2 g/dL      RDW 13 9 %      MPV 10 7 fL      Platelets 895 Thousands/uL      nRBC 0 /100 WBCs      Neutrophils Relative 60 %      Immat GRANS % 1 %      Lymphocytes Relative 25 %      Monocytes Relative 10 %      Eosinophils Relative 3 %      Basophils Relative 1 %      Neutrophils Absolute 4 29 Thousands/µL      Immature Grans Absolute 0 05 Thousand/uL      Lymphocytes Absolute 1 78 Thousands/µL      Monocytes Absolute 0 73 Thousand/µL      Eosinophils Absolute 0 18 Thousand/µL      Basophils Absolute 0 04 Thousands/µL                  No orders to display              Procedures  Procedures       Phone Contacts  ED Phone Contact    ED Course                               MDM  Number of Diagnoses or Management Options  Diabetic ulcer of toe of left foot associated with type 2 diabetes mellitus, with other ulcer severity ([facility] ): new and requires workup  Diagnosis management comments: Patient is a diabetic who presents with diabetic foot ulcer  Patient was hospitalized 2 weeks ago for similar symptoms  He had a wound culture at that time which grew E coli and Pseudomonas  Both were susceptible to Cipro  He was discharged on Cipro and Flagyl  However he is noncompliant with his medications  He states that he cannot swallow the tablets  Today, the ulcers are shallow with no surrounding cellulitis  Patient does not have evidence of sepsis  He is afebrile with no leukocytosis  I spoke with pharmacy who states that Cipro and Flagyl can be crushed  Patient is agreeable to taking medications at home and has follow-up with Podiatry tomorrow  Amount and/or Complexity of Data Reviewed  Clinical lab tests: ordered and reviewed  Tests in the medicine section of CPT®: ordered and reviewed  Review and summarize past medical records: yes  Independent visualization of images, tracings, or specimens: yes    Risk of Complications, Morbidity, and/or Mortality  Presenting problems: moderate  Diagnostic procedures: low  Management options: moderate    Patient Progress  Patient progress: stable      Disposition  Final diagnoses:   Diabetic ulcer of toe of left foot associated with type 2 diabetes mellitus, with other ulcer severity ([facility] )     Time reflects when diagnosis was documented in both MDM as applicable and the Disposition within this note     Time User Action Codes Description Comment    2/27/2019  6:23 PM Matt Bliss Add [D66 541,  M83 [id number]] Diabetic ulcer of toe of left foot associated with type 2 diabetes mellitus, with other ulcer severity [facility])       ED Disposition     ED Disposition Condition Date/Time Comment    Discharge Stable Wed Feb 27, 2019  6:23 PM Stefanie Cordero discharge to home/self care              Follow-up Information     Follow up With Specialties Details Why Vincenzo 124, DO Internal Medicine Schedule an appointment as soon as possible for a visit   [address] [address]  [phone]            Patient's Medications   Discharge Prescriptions    No medications on file     No discharge procedures on file      ED Provider  Electronically Signed by           Mario Kendall DO  02/27/19 8250

## 2019-02-28 ENCOUNTER — OFFICE VISIT (OUTPATIENT)
Dept: INTERNAL MEDICINE CLINIC | Facility: CLINIC | Age: 59
End: 2019-02-28
Payer: COMMERCIAL

## 2019-02-28 ENCOUNTER — TELEPHONE (OUTPATIENT)
Dept: INTERNAL MEDICINE CLINIC | Facility: CLINIC | Age: 59
End: 2019-02-28

## 2019-02-28 VITALS
HEART RATE: 76 BPM | RESPIRATION RATE: 18 BRPM | BODY MASS INDEX: 34.88 KG/M2 | SYSTOLIC BLOOD PRESSURE: 134 MMHG | WEIGHT: 243.6 LBS | DIASTOLIC BLOOD PRESSURE: 80 MMHG | HEIGHT: 70 IN | TEMPERATURE: 97.4 F | OXYGEN SATURATION: 97 %

## 2019-02-28 DIAGNOSIS — F41.8 ANXIETY ASSOCIATED WITH DEPRESSION: ICD-10-CM

## 2019-02-28 DIAGNOSIS — I12.9 BENIGN HYPERTENSION WITH CKD (CHRONIC KIDNEY DISEASE) STAGE III (HCC): ICD-10-CM

## 2019-02-28 DIAGNOSIS — N18.30 BENIGN HYPERTENSION WITH CKD (CHRONIC KIDNEY DISEASE) STAGE III (HCC): ICD-10-CM

## 2019-02-28 DIAGNOSIS — E11.42 DIABETIC POLYNEUROPATHY ASSOCIATED WITH TYPE 2 DIABETES MELLITUS (HCC): ICD-10-CM

## 2019-02-28 DIAGNOSIS — E78.2 MIXED HYPERLIPIDEMIA: ICD-10-CM

## 2019-02-28 DIAGNOSIS — Z86.73 HISTORY OF STROKE: ICD-10-CM

## 2019-02-28 DIAGNOSIS — Z79.4 TYPE 2 DIABETES MELLITUS WITH HYPERGLYCEMIA, WITH LONG-TERM CURRENT USE OF INSULIN (HCC): Primary | ICD-10-CM

## 2019-02-28 DIAGNOSIS — E11.65 TYPE 2 DIABETES MELLITUS WITH HYPERGLYCEMIA, WITH LONG-TERM CURRENT USE OF INSULIN (HCC): Primary | ICD-10-CM

## 2019-02-28 DIAGNOSIS — Z12.11 SCREEN FOR COLON CANCER: ICD-10-CM

## 2019-02-28 PROCEDURE — 99495 TRANSJ CARE MGMT MOD F2F 14D: CPT | Performed by: INTERNAL MEDICINE

## 2019-02-28 NOTE — TELEPHONE ENCOUNTER
Message   Received:  Today   Message Contents   Chester Haque, Rosendo Cochran [facility] Internal Med Clinical             Let pt's wife know -> I discussed  With his kidney dr's office & he should increase lexapro to 10mg per day and return to see me in 1 month     thx

## 2019-02-28 NOTE — Clinical Note
Let pt's wife know -> I discussed  With his kidney dr's office & he should increase lexapro to 10mg per day and return to see me in 1 monththx

## 2019-02-28 NOTE — Clinical Note
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 thx

## 2019-02-28 NOTE — PATIENT INSTRUCTIONS
1  Counselor appointment  2  Continue lexapro  3  No sugary snacks  4  Carbohydrate counting    Mastery of Your Anxiety and Worry by Uzma Cardenas  The relaxation and stress reduction workbook by Sarah Dailey  "E-couch" an online free therapy program    Basic Carbohydrate Counting   AMBULATORY CARE:   Carbohydrate counting  is a way to plan your meals by counting the amount of carbohydrate in foods  Carbohydrates are the sugars, starches, and fiber found in fruit, grains, vegetables, and milk products  Carbohydrates increase your blood sugar levels  Carbohydrate counting can help you eat the right amount of carbohydrate to keep your blood sugar levels under control  What you need to know about planning meals using carbohydrate counting:  · A dietitian or healthcare provider will help you develop a healthy meal plan that works best for you  You will be taught how much carbohydrate to eat or drink for each meal and snack  Your meal plan will be based on your age, weight, usual food intake, and physical activity level  If you have diabetes, it will also include your blood sugar levels and diabetes medicine  Once you know how much carbohydrate you should eat, you can decide what type of food you want to eat  · You will need to know what foods contain carbohydrate and how much they contain  Keep track of the amount of carbohydrate in meals and snacks in order to follow your meal plan  Do not avoid carbohydrates or skip meals  Your blood sugar may fall too low if you do not eat enough carbohydrate or you skip meals  Foods that contain carbohydrate:   · Breads:  Each serving of food listed below contains about 15 g of carbohydrate   ¨ 1 slice of bread (1 ounce) or 1 flour or corn tortilla (6 inch)    ¨ ½ of a hamburger bun or ¼ of a large bagel (about 1 ounce)    ¨ 1 pancake (about 4 inches across and ¼ inch thick)    · Cereals and grains:  Serving sizes of ready-to-eat cereals vary   Look at the serving size and the total carbohydrate amount listed on the food label  Each serving of food listed below contains about 15 g of carbohydrate   ¨ ¾ cup of dry, unsweetened, ready-to-eat cereal or ¼ cup of low-fat granola     ¨ ½ cup of oatmeal or other cooked cereal     ¨ ? cup of cooked rice or pasta    · Starchy vegetables and beans:  Each serving of food listed below contains about 15 g of carbohydrate   ¨ ½ cup of corn, green peas, sweet potatoes, or mashed potatoes    ¨ ¼ of a large baked potato    ¨ ½ cup of beans, lentils, and peas (garbanzo, mars, kidney, white, split, black-eyed)    · Crackers and snacks:  Each serving of food listed below contains about 15 g of carbohydrate   ¨ 3 gareth cracker squares or 8 animal crackers     ¨ 6 saltine-type crackers    ¨ 3 cups of popcorn or ¾ ounce of pretzels, potato chips, or tortilla chips    · Fruit:  Each serving of food listed below contains about 15 g of carbohydrate   ¨ 1 small (4 ounce) piece of fresh fruit or ¾ to 1 cup of fresh fruit    ¨ ½ cup of canned or frozen fruit, packed in natural juice    ¨ ½ cup (4 ounces) of unsweetened fruit juice    ¨ 2 tablespoons of dried fruit    · Desserts or sugary foods:  Each serving of food listed below contains about 15 g of carbohydrate   ¨ 2-inch square unfrosted cake or brownie     ¨ 2 small cookies    ¨ ½ cup of ice cream, frozen yogurt, or nondairy frozen yogurt    ¨ ¼ cup of sherbet or sorbet    ¨ 1 tablespoon of regular syrup, jam, or jelly    ¨ 2 tablespoons of light syrup    · Milk and yogurt:  Foods from the milk group contain about 12 g of carbohydrate per serving  ¨ 1 cup of fat-free or low-fat milk    ¨ 1 cup of soy milk    ¨ ? cup of fat-free, yogurt sweetened with artificial sweetener    · Non-starchy vegetables:  Each serving contains about 5 g of carbohydrate   Three servings of non-starch vegetables count as 1 carbohydrate serving       ¨ ½ cup of cooked vegetables or 1 cup of raw vegetables  This includes beets, broccoli, cabbage, cauliflower, cucumber, mushrooms, tomatoes, and zucchini    ¨ ½ cup of vegetable juice  How to use carbohydrate counting to plan meals:   · Count carbohydrate amounts using serving sizes:      ¨ Pasta dinner example: You plan to have pasta, tossed salad, and an 8-ounce glass of milk  Your healthcare provider tells you that you may have 4 carbohydrate servings for dinner  One carbohydrate serving of pasta is ? cup  One cup of pasta will equal 3 carbohydrate servings  An 8-ounce glass of milk will count as 1 carbohydrate serving  These amounts of food would equal 4 carbohydrate servings  One cup of tossed salad does not count toward your carbohydrate servings  · Count carbohydrate amounts using food labels:  Find the total amount of carbohydrate in a packaged food by reading the food label  Food labels tell you the serving size of the food and the total carbohydrate amount in each serving  Find the serving size on the food label and then decide how many servings you will eat  Multiply the number of servings you plan to eat by the carbohydrate amount per serving  ¨ Granola bar snack example: Your meal plan allows you to have 2 carbohydrate servings (30 grams) of carbohydrate for a snack  You plan to eat 1 package of granola bars, which contains 2 bars  According to the food label, the serving size of food in this package is 1 bar  Each serving (1 bar) contains 25 grams of carbohydrate  The total amount of carbohydrate in this package of granola bars would be 50 g  Based on your meal plan, you should eat only 1 bar  Follow up with your healthcare provider as directed:  Write down your questions so you remember to ask them during your visits  © 2017 [employer] Information is for End User's use only and may not be sold, redistributed or otherwise used for commercial purposes   All illustrations and images included in CareNotes® are the copyrighted property of [employer]  or Vahid Jacobo  The above information is an  only  It is not intended as medical advice for individual conditions or treatments  Talk to your doctor, nurse or pharmacist before following any medical regimen to see if it is safe and effective for you

## 2019-02-28 NOTE — PROGRESS NOTES
TCM Call (since 1/28/2019)     Date and time call was made  2/28/2019  2:50 PM    Hospital care reviewed  Records reviewed    Patient was hospitialized at  [address] [place] [address]    Date of Admission  02/14/19    Date of discharge  02/16/19    Diagnosis  Cellulitis    Disposition  Home    Were the patients medications reviewed and updated  Yes    Current Symptoms  None      TCM Call (since 1/28/2019)     Post hospital issues  None    Should patient be enrolled in anticoag monitoring? No    Scheduled for follow up? Yes    Did you obtain your prescribed medications  Yes    Do you need help managing your prescriptions or medications  No    Is transportation to your appointment needed  No    I have advised the patient to call PCP with any new or worsening symptoms  VANDANA    Living Arrangements  Spouse or Significiant other        Assessment/Plan:     Diagnoses and all orders for this visit:    Type 2 diabetes mellitus with hyperglycemia, with long-term current use of insulin ([facility] )  Comments:  limit sugary foods/sweets  c/w insulin at current dose for now(in sliding scale for meal insulin already per endo)  Orders:  -     Ambulatory referral to [facility]; Future    Mixed hyperlipidemia  Comments:  taking statin & no SE, c/w rx    History of stroke  -     Ambulatory referral to [facility]; Future    Benign hypertension with CKD (chronic kidney disease) stage III (HCC)  Comments:  BP borderline elevated, likely due to weight gain/poor diet  re-check at f/u visit in 1 month    Diabetic polyneuropathy associated with type 2 diabetes mellitus ([facility] )  Comments:  with toe ulcer/wound    seeing podiatry for ongoing care and emphasized importance of treatment compliance    Anxiety associated with depression  Comments:  taking lexapro, t/c dose increase -> will confirm with renal that this is okay with CKD stage 4   counselor referral provided    Screen for colon cancer  Comments:  pt requested colonoscopy order, will mail to patient  Orders:  -     Ambulatory referral to Gastroenterology; Future          Subjective:      Patient ID: Efe Haile is a 62 y o  male  HPI    Here for hospital discharge follow up, here with wife during appt  Was admitted for foot/toe infection, treated and d/c'd to home  Not taking abx as advised by podiatry and having more symptoms again  BS are elevated in low 200s, wife states he is eating all snacks/donuts and unhealthy food  Megan Sneed feels anxious/depressed, they had to sell their house  Recovered from his stroke with comprehensive PT, seeing Podiatry(Dr Yokasta Ken) for ongoing care  Taking lexapro for depression which is helping but wife would like to increase dose  No other complaints  Past Medical History:   Diagnosis Date    Diabetes mellitus ([facility] [address] )     Hypercholesteremia     Hyperlipidemia     Hypertension     Neuropathy     Obesity     Osteomyelitis ([facility] [address] )     last assessed 11/4/16    PVC's (premature ventricular contractions)     sees cardiology Dr Hamilton camargo    Stroke [facility])     last weeof July 2018 [address]     Vitals:    02/28/19 1452 02/28/19 1632   BP: 140/70 134/80   Pulse: 76    Resp: 18    Temp: (!) 97 4 °F (36 3 °C)    TempSrc: Oral    SpO2: 97%    Weight: 110 kg (243 lb 9 6 oz)    Height: 5' 10" (1 778 m)      Body mass index is 34 95 kg/m²      Current Outpatient Medications:     amLODIPine (NORVASC) 5 mg tablet, Take 1 tablet (5 mg total) by mouth every 12 (twelve) hours, Disp: 180 tablet, Rfl: 1    aspirin (ECOTRIN LOW STRENGTH) 81 mg EC tablet, Take 81 mg by mouth daily Resume on 8/14, Disp: , Rfl:     atorvastatin (LIPITOR) 80 mg tablet, Take 1 tablet (80 mg total) by mouth daily with dinner, Disp: 90 tablet, Rfl: 1    Blood Glucose Monitoring Suppl (ONE TOUCH ULTRA MINI) w/Device KIT, by Does not apply route 3 (three) times a day, Disp: 1 each, Rfl: 0    Blood Pressure Monitoring (BLOOD PRESSURE CUFF) MISC, Use to check blood pressure before taking blood pressure medication and 1 hour after and follow instructions provided in discharge instructions based on the readings  , Disp: 1 each, Rfl: 0    carvedilol (COREG) 6 25 mg tablet, Take 1 tablet (6 25 mg total) by mouth 2 (two) times a day with meals, Disp: 180 tablet, Rfl: 1    cholecalciferol 86732 units TABS, Take 1 tablet (50,000 Units total) by mouth once a week, Disp: 5 tablet, Rfl: 5    [employer] GLUCOSE 4-6 GM-MG, CHEW 3 TABLETS BY MOUTH DAILY AS NEEDED, Disp: , Rfl: 0    cyanocobalamin 1000 MCG tablet, Take 1 tablet (1,000 mcg total) by mouth daily, Disp: 30 tablet, Rfl: 5    diclofenac sodium (VOLTAREN) 1 %, Apply 2 g topically 3 (three) times a day, Disp: 100 g, Rfl: 1    escitalopram (LEXAPRO) 5 mg tablet, Take 1 tablet (5 mg total) by mouth daily, Disp: 30 tablet, Rfl: 2    famotidine (PEPCID) 20 mg tablet, Take 20 mg by mouth daily Resume on 8/14, Disp: , Rfl:     gabapentin (NEURONTIN) 250 mg/5 mL solution, Take 12 mL (600 mg total) by mouth daily at bedtime, Disp: 470 mL, Rfl: 2    glucose 4 g chewable tablet, Chew 3 tablets (12 g total) as needed for low blood sugar, Disp: 50 tablet, Rfl: 0    glucose blood (ONE TOUCH ULTRA TEST) test strip, 1 each by Other route 3 (three) times a day Use as instructed, Disp: 270 each, Rfl: 1    Incontinence Supplies (MALE URINAL) MISC, by Does not apply route daily, Disp: 6 each, Rfl: 3    insulin aspart (NovoLOG) 100 units/mL injection, Inject 8 Units under the skin 3 (three) times a day before meals plus scale  Scale - 150-200 -2 units, 201-250-4 units, 251-300 -6 units, 301-350-8 units, > 350- 10 units (Patient taking differently: Inject 12 Units under the skin 3 (three) times a day before meals plus scale  Scale - 150-200 -2 units, 201-250-4 units, 251-300 -6 units, 301-350-8 units, > 350- 10 units ), Disp: 50 mL, Rfl: 1    insulin glargine (LANTUS) 100 units/mL subcutaneous injection, Inject 25 Units under the skin daily (Patient taking differently: Inject 30 Units under the skin daily  ), Disp: 10 mL, Rfl: 0    Insulin Syringe-Needle U-100 (B-D INS SYR ULTRAFINE  3CC/30G) 30G X 1/2" 0 3 ML MISC, by Does not apply route 4 (four) times a day, Disp: 360 each, Rfl: 1    Insulin Syringe-Needle U-100 (B-D INS SYRINGE 0 5CC/30GX1/2") 30G X 1/2" 0 5 ML MISC, Inject under the skin 4 (four) times a day, Disp: 360 each, Rfl: 1    lactulose 20 g/30 mL, Take 30 mL (20 g total) by mouth daily as needed (constipation), Disp: 473 mL, Rfl: 0    LUCENTIS 0 3 MG/0 05ML, , Disp: , Rfl:     neomycin-bacitracin-polymyxin b (NEOSPORIN) ointment, Apply 1 application topically 2 (two) times a day Apply twice per day to shin wound until fully healed  If not fully healed in 5 days contact your family doctor  Next application is the evening of 8/13, Disp: , Rfl:     ondansetron (ZOFRAN-ODT) 4 mg disintegrating tablet, Take 1 tablet (4 mg total) by mouth every 6 (six) hours as needed for nausea or vomiting, Disp: 20 tablet, Rfl: 0    ONETOUCH DELICA LANCETS 18K MISC, by Does not apply route 3 (three) times a day, Disp: 270 each, Rfl: 1  No Known Allergies      Review of Systems   Constitutional: Negative for fever  HENT: Negative for congestion  Eyes: Negative for visual disturbance  Respiratory: Negative for shortness of breath  Cardiovascular: Negative for chest pain  Gastrointestinal: Negative for abdominal pain  Genitourinary: Negative for dysuria  Musculoskeletal: Positive for arthralgias (toe/foot pain)  Skin: Negative for rash  Neurological: Positive for numbness  Psychiatric/Behavioral: Positive for dysphoric mood  The patient is nervous/anxious  Objective:      /80   Pulse 76   Temp (!) 97 4 °F (36 3 °C) (Oral)   Resp 18   Ht 5' 10" (1 778 m)   Wt 110 kg (243 lb 9 6 oz)   SpO2 97%   BMI 34 95 kg/m²          Physical Exam   Constitutional: He appears well-developed and well-nourished     +obese   HENT: Head: Normocephalic and atraumatic  Cardiovascular: Normal rate, regular rhythm and normal heart sounds  No murmur heard  Pulmonary/Chest: Effort normal and breath sounds normal  He has no wheezes  He has no rales  Abdominal: Soft  Bowel sounds are normal  There is no tenderness  Musculoskeletal: He exhibits no edema  Lymphadenopathy:     He has no cervical adenopathy  Neurological: He is alert  Psychiatric: His behavior is normal  His mood appears anxious  He exhibits a depressed mood  Vitals reviewed  -------------------    ALLISON Santiago Rai, DO             That would be fine to increase lexapro   Unfortunately, patient has not seen our office in almost a year  [facility] reestablish care with us  Thank you    Previous Messages      ----- Message -----   From: Jhony Etienne DO   Sent: 2/28/2019   3:29 PM   To: Santa Luciano MD     Binta Angelo has stage 3-4 CKD   I would like to increase lexapro to 10mg per day   is this okay to do with his kidney function?      thx

## 2019-02-28 NOTE — PROGRESS NOTES
TCM Call (since 1/28/2019)     Date and time call was made  2/28/2019  2:50 PM    Hospital care reviewed  Records reviewed    Patient was hospitialized at  [facility]    Date of Admission  02/14/19    Date of discharge  02/16/19    Diagnosis  Cellulitis    Disposition  Home    Were the patients medications reviewed and updated  Yes    Current Symptoms  None      TCM Call (since 1/28/2019)     Post hospital issues  None    Should patient be enrolled in anticoag monitoring? No    Scheduled for follow up?   Yes    Did you obtain your prescribed medications  Yes    Do you need help managing your prescriptions or medications  No    Is transportation to your appointment needed  No    I have advised the patient to call PCP with any new or worsening symptoms  VANDANA    Living Arrangements  Spouse or Significiant other

## 2019-03-01 NOTE — TELEPHONE ENCOUNTER
I reviewed the FTD results  Please see below:  Occupational Therapy Skilled Analysis Assessment and Plan of Care:  Pt is a 62 y o  male referred to Occupational Therapy for Fitness to Drive Evaluation to assess pts cognitive, visual, and motor abilities to drive safely in community environment  Pt requires overall mod I for ADLs/self care and mod I for fx'l mobility w/o DME  Pt is currently demonstrating the following occupational deficits: limited 2* decreased endurance/activity tolerance, generalized weakness, deconditioning, SOB, CÁRDENAS, fatigue, fall risk, impaired balance, impulsive, decreased problem solving and sequencing, delayed processing, decreased auditory processing, poor insight judgement and safety awareness into deficits, mild RUE residual hemiparesis w/ impaired FMC/FMS/GMC/GMS, LLE wound w/ [place] wedge (WBAT in wedge), poor STM/immediate delayed recall, working memory  Pt scoring cognitively at 73% predicted probability of failing a specialized on-road test   This indicated abilities for driving are affected--results ranging from 50% to 72% indicated a greater probability of failing an on-road evaluation than passing one  Individuals scoring in this range have a much higher than average risk of performing a dangerous error on the road that would affect other drivers  Pt scoring cognitively at a 50% probability of creating a hazardous situation on a specialized road test  Below average scoring was noted in the following areas:initialization and reactions, track and process visual events, ability to encode, retrieve, and/or respond to stimuli, judgement of spatial relations, guided movement control, ability to quickly respond to complex information, impulse control  Where available, a specialized [facility] should be [facility] to further assess Pts safe driving abilities in community environment      At this point he should have a [employer] actual road test   Please send the appropriate form to the DOT to get him set up and he should stop driving until they assess him

## 2019-03-04 ENCOUNTER — OFFICE VISIT (OUTPATIENT)
Dept: OCCUPATIONAL THERAPY | Facility: CLINIC | Age: 59
End: 2019-03-04
Payer: COMMERCIAL

## 2019-03-04 ENCOUNTER — OFFICE VISIT (OUTPATIENT)
Dept: PHYSICAL THERAPY | Facility: CLINIC | Age: 59
End: 2019-03-04
Payer: COMMERCIAL

## 2019-03-04 DIAGNOSIS — M25.531 RIGHT WRIST PAIN: Primary | ICD-10-CM

## 2019-03-04 DIAGNOSIS — I63.9 CEREBROVASCULAR ACCIDENT (CVA), UNSPECIFIED MECHANISM (HCC): Primary | ICD-10-CM

## 2019-03-04 DIAGNOSIS — Z86.73 HISTORY OF STROKE: ICD-10-CM

## 2019-03-04 PROCEDURE — 97530 THERAPEUTIC ACTIVITIES: CPT

## 2019-03-04 PROCEDURE — 97140 MANUAL THERAPY 1/> REGIONS: CPT | Performed by: PHYSICAL THERAPIST

## 2019-03-04 PROCEDURE — 97112 NEUROMUSCULAR REEDUCATION: CPT | Performed by: PHYSICAL THERAPIST

## 2019-03-04 PROCEDURE — 97110 THERAPEUTIC EXERCISES: CPT

## 2019-03-04 PROCEDURE — 97140 MANUAL THERAPY 1/> REGIONS: CPT

## 2019-03-04 NOTE — PROGRESS NOTES
Daily Note     Today's date: 3/4/2019  Patient name: Katelynn Ny  : 1960  MRN: [id number]  Referring provider: Rea Akhtar DO  Dx:   Encounter Diagnosis     ICD-10-CM    1  Right wrist pain M25 531    2  History of stroke Z86 73                   Subjective:  7-8/10 right forearm patient a bit emotional today      Objective:   Completed Hot pack for warm up, manual therapy, exercises and activities to increase ROM, strength, coordination and function  See treatment diary below-  Status remain the same    Precautions TIA    Specialty Daily Treatment Diary     Manual  2/20/19 2/25/19 2/27/19 3/4/19    graston To forearm To forearm To forearm To forearm                                        Exercise Diary  2/20/19 2/25/19 2/27/19 3/4/19    Clothes pegs Firm 2 x 24 Firm 2 x 24 Firm 2 x 24 Firm 2 x 24    dice 3 x 12  3 x 12 3 x 12    Gripper   Y3 x 15 Y3 x 20 Y3 x 25    Small pegs    Key pegs 2 x 21    Wrist weight   Flex 3lb x 12  Ext 3lb x 12  Dev 3lb x 12 Flex 3lb x 15  Ext 2lb x 15  Dev 2lb x 15 Flex 3lb x 15  Ext 3lb x 15  Dev 3lb x 15    Pinching clothes peg with marble  15      thera bar bends    Orange bar x 15                                                                                                                Modalities 2/20/19 2/25/19 2/27/19 3/4/19    Moist heat X ~ 5 mins X ~ 5 mins X~ 5 mins X ~ 5 mins                          Assessment: Tolerated treatment fair  Patient would benefit from continued OT- difficulty with following directions      Plan: Continue per plan of care

## 2019-03-04 NOTE — PROGRESS NOTES
Daily Note     Today's date: 3/4/2019  Patient name: Deisy Sanchez  : 1960  MRN: [id number]  Referring provider: Verenice Gillespie MD  Dx:   Encounter Diagnosis     ICD-10-CM    1  Cerebrovascular accident (CVA), unspecified mechanism ([facility] [address] ) I63 9                  Subjective: Pt reports he went to ER last week to assess L foot  Reports"There's nothing they can do about it " Reports 50% improvements in neck pain  Objective: See treatment diary below  -TRP B/L SCM, UT , splenius capitis, suboccipital release, x20 minutes     Solo Step:  Amb fwd, bwd, and lateral with ball toss  High march with opposite hand to knee - static and with ambulation  9" hurdles- fwd and lateral  Step taps (6" step) from foam pad      Assessment:    Pt tolerated treatment session well today  Reports neck pain is about 50% improved  Increased hypertonicity t/o suboccipitals and splenius capitis bilaterally; SCM feels improved  Continues to report L foot pain but able to tolerate all balance activities without any complaints  Pt has multiple LOB when performing activities without UE support, requires CG/CS in solo step harness  Pt requires constant cueing to slow down during balance activities as he tends to rush through and causes increased LOB  Pt requires continued skilled PT services  To further improve balance and reduce fall risk, as well as improve neck pain         Plan: Continue per plan of care per patient tolerance

## 2019-03-05 DIAGNOSIS — E53.8 B12 DEFICIENCY: ICD-10-CM

## 2019-03-05 RX ORDER — OMEGA-3/DHA/EPA/FISH OIL 35-113.5MG
TABLET,CHEWABLE ORAL
Qty: 30 TABLET | Refills: 5 | Status: SHIPPED | OUTPATIENT
Start: 2019-03-05 | End: 2019-03-13 | Stop reason: SDUPTHER

## 2019-03-06 ENCOUNTER — OFFICE VISIT (OUTPATIENT)
Dept: PHYSICAL THERAPY | Facility: CLINIC | Age: 59
End: 2019-03-06
Payer: COMMERCIAL

## 2019-03-06 ENCOUNTER — OFFICE VISIT (OUTPATIENT)
Dept: OCCUPATIONAL THERAPY | Facility: CLINIC | Age: 59
End: 2019-03-06
Payer: COMMERCIAL

## 2019-03-06 DIAGNOSIS — Z86.73 HISTORY OF STROKE: ICD-10-CM

## 2019-03-06 DIAGNOSIS — M25.531 RIGHT WRIST PAIN: Primary | ICD-10-CM

## 2019-03-06 DIAGNOSIS — I63.9 CEREBROVASCULAR ACCIDENT (CVA), UNSPECIFIED MECHANISM (HCC): Primary | ICD-10-CM

## 2019-03-06 PROCEDURE — 97112 NEUROMUSCULAR REEDUCATION: CPT | Performed by: PHYSICAL THERAPIST

## 2019-03-06 PROCEDURE — 97140 MANUAL THERAPY 1/> REGIONS: CPT | Performed by: PHYSICAL THERAPIST

## 2019-03-06 PROCEDURE — 97140 MANUAL THERAPY 1/> REGIONS: CPT

## 2019-03-06 PROCEDURE — 97112 NEUROMUSCULAR REEDUCATION: CPT

## 2019-03-06 PROCEDURE — 97110 THERAPEUTIC EXERCISES: CPT

## 2019-03-06 NOTE — PROGRESS NOTES
Daily Note     Today's date: 3/6/2019  Patient name: Joce Dixon  : 1960  MRN: [id number]  Referring provider: Paul Franklin DO  Dx:   Encounter Diagnosis     ICD-10-CM    1  Right wrist pain M25 531    2  History of stroke Z86 73                   Subjective:  7/10 right forearm       Objective:   Completed Hot pack for warm up, manual therapy, exercises and activities to increase ROM, strength, coordination and function  See treatment diary below-  Status remain the same    Precautions TIA    Specialty Daily Treatment Diary     Manual  2/20/19 2/25/19 2/27/19 3/4/19 3/6/19   graston To forearm To forearm To forearm To forearm To forearm                                       Exercise Diary  2/20/19 2/25/19 2/27/19 3/4/19 3/6/19   Clothes pegs Firm 2 x 24 Firm 2 x 24 Firm 2 x 24 Firm 2 x 24 2 x 24   dice 3 x 12  3 x 12 3 x 12 3 x 12   Gripper   Y3 x 15 Y3 x 20 Y3 x 25 Y3 x 25   Small pegs    Key pegs 2 x 21    Wrist weight   Flex 3lb x 12  Ext 3lb x 12  Dev 3lb x 12 Flex 3lb x 15  Ext 2lb x 15  Dev 2lb x 15 Flex 3lb x 15  Ext 3lb x 15  Dev 3lb x 15 Flex 3lb x 15  Ext 3lb x 15  Dev 3lb x 15   Pinching clothes peg with marble  15      thera bar bends    [place] bar x 15 Orange bar x 15   Weight pulley     tricep #3 x 12  IE # 3 x 12  ER #3 x 12  Prot #3 x 12  Bicep #2 x 12                                                                                                       Modalities 2/20/19 2/25/19 2/27/19 3/4/19 3/6/19   Moist heat X ~ 5 mins X ~ 5 mins X~ 5 mins X ~ 5 mins X ~ 5 mins                         Assessment: Tolerated treatment fair  Patient would benefit from continued OT- difficulty with following directions continue to report increased pain-       Plan: Continue per plan of care

## 2019-03-06 NOTE — PROGRESS NOTES
Daily Note     Today's date: 3/6/2019  Patient name: Dennis Singh  : 1960  MRN: [id number]  Referring provider: Malathi Skelton MD  Dx:   Encounter Diagnosis     ICD-10-CM    1  Cerebrovascular accident (CVA), unspecified mechanism ([facility] [address] ) I63 9        Start Time: 1603  Stop Time: 1645  Total time in clinic (min): 42 minutes  Subjective: Patient reported he just came from OT and has no complaints of pain  Stated he had a headache last night around 9 o'clock  Objective: See treatment diary below  -TRP B/L SCM, UT , splenius capitis, suboccipital release, x16 minutes     Solo Step:  -Amb fwd, bwd,lateral with ball toss 4 cycles each way; verbal cues for bigger steps   -Ladder drills: forward with big steps x3 cycles, lateral stepping x2 cycles, forward marches x3 cycles; attempted to trial reciprocal arm swing with stepping however patient had difficulty coordinating opposite arm/opposite leg   - Standing on foam with step taps to cones x24 reps; therapist CGA due to preference to place foot at edge of foam and lose balance       Assessment:  Patient tolerated treatment session well today with reports of minor fatigue at the conclusion of the session  Increased hypertonicity noted bilaterally especially in upper trapezius today with patient noting improved neck range of motion after manual interventions  Continues to reports L foot pain however was able to complete all standing activities without complaints  Patient required verbal cues to decrease pace 50% of the time  Noted difficulty controlling walking backwards with preference to rely on momentum to move in this direction  Difficulty noted today with completing cone taps from foam especially when relying on R LE for support and lifting L LE to tap cone  Pt requires continued skilled PT services  to further improve balance and reduce fall risk, as well as improve neck pain       Plan: Continue per plan of care per patient tolerance

## 2019-03-07 ENCOUNTER — OFFICE VISIT (OUTPATIENT)
Dept: NEUROLOGY | Facility: CLINIC | Age: 59
End: 2019-03-07
Payer: COMMERCIAL

## 2019-03-07 VITALS
DIASTOLIC BLOOD PRESSURE: 58 MMHG | HEIGHT: 70 IN | BODY MASS INDEX: 34.07 KG/M2 | RESPIRATION RATE: 12 BRPM | SYSTOLIC BLOOD PRESSURE: 134 MMHG | WEIGHT: 238 LBS

## 2019-03-07 DIAGNOSIS — M54.2 NECK PAIN: ICD-10-CM

## 2019-03-07 DIAGNOSIS — Z86.73 HISTORY OF STROKE: Primary | ICD-10-CM

## 2019-03-07 DIAGNOSIS — E11.42 DIABETIC POLYNEUROPATHY ASSOCIATED WITH TYPE 2 DIABETES MELLITUS (HCC): ICD-10-CM

## 2019-03-07 LAB
BUN SERPL-MCNC: 39 MG/DL (ref 7–25)
BUN/CREAT SERPL: 15 (CALC) (ref 6–22)
CALCIUM SERPL-MCNC: 9.2 MG/DL (ref 8.6–10.3)
CHLORIDE SERPL-SCNC: 105 MMOL/L (ref 98–110)
CO2 SERPL-SCNC: 24 MMOL/L (ref 20–32)
CREAT SERPL-MCNC: 2.67 MG/DL (ref 0.7–1.33)
CREAT UR-MCNC: 88 MG/DL (ref 20–320)
GLUCOSE SERPL-MCNC: 173 MG/DL (ref 65–139)
POTASSIUM SERPL-SCNC: 4.5 MMOL/L (ref 3.5–5.3)
PROT UR-MCNC: 672 MG/DL (ref 5–25)
PROT/CREAT UR: 7636 MG/G CREAT (ref 22–128)
SL AMB EGFR AFRICAN AMERICAN: 29 ML/MIN/1.73M2
SL AMB EGFR NON AFRICAN AMERICAN: 25 ML/MIN/1.73M2
SODIUM SERPL-SCNC: 137 MMOL/L (ref 135–146)

## 2019-03-07 PROCEDURE — 99213 OFFICE O/P EST LOW 20 MIN: CPT | Performed by: PHYSICAL MEDICINE & REHABILITATION

## 2019-03-07 RX ORDER — GABAPENTIN 250 MG/5ML
600 SOLUTION ORAL
Qty: 470 ML | Refills: 2 | Status: SHIPPED | OUTPATIENT
Start: 2019-03-07 | End: 2020-12-27 | Stop reason: HOSPADM

## 2019-03-07 NOTE — PROGRESS NOTES
Physical Medicine & Rehabilitation Follow-up Note  Nelson Montez 61 y o  male    ASSESSMENT/PLAN:     27-year-old male with a history of diabetes mellitus, hypertension, hyperlipidemia, and history of left lacunar ischemic stroke sustained in 2018 who presents today for follow-up  Last seen by me on 1/30/19 for left sided neck pain  Neck pain has completely resolved and patient no longer requires TPI today  Follow-up in 4 months for post stroke deficits  Patient asked about driving and scored a  73% on DCAT - high probability of failing on the road test, largely due to cognitive deficits  I have strongly advised patient NOT to proceed with [place] testing at this time, but to continue outpatient therapies and CVA recovery  Patient likely requires more time for neuro-recovery and driving is not safe at this time  Patient verbalized understanding  Repeat fitness to drive can be entertained at our next visit in 4 months  Diagnoses and all orders for this visit:    History of stroke    Neck pain         Review of Systems   Constitutional: Negative  HENT: Negative  Eyes: Negative  Respiratory: Negative  Cardiovascular: Negative  Gastrointestinal: Negative  Endocrine: Negative  Genitourinary: Negative  Musculoskeletal: Positive for neck pain  Skin: Negative  Allergic/Immunologic: Negative  Neurological: Negative  Hematological: Negative  Psychiatric/Behavioral: Negative  All other systems reviewed and are negative  OBJECTIVE:   /58 (BP Location: Left arm, Patient Position: Sitting, Cuff Size: Standard)   Resp 12   Ht 5' 10" (1 778 m)   Wt 108 kg (238 lb)   BMI 34 15 kg/m²       Physical Exam   Constitutional: He appears well-developed and well-nourished  HENT:   Head: Normocephalic and atraumatic  Eyes: Pupils are equal, round, and reactive to light  EOM are normal    Cardiovascular: Normal rate and regular rhythm     Pulmonary/Chest: Breath sounds normal  He has no wheezes  He has no rales  Abdominal: Soft  Bowel sounds are normal  He exhibits no distension  There is no tenderness  Skin: Skin is warm  Psychiatric: He has a normal mood and affect  Nursing note and vitals reviewed  Current Outpatient Medications:     amLODIPine (NORVASC) 5 mg tablet, Take 1 tablet (5 mg total) by mouth every 12 (twelve) hours, Disp: 180 tablet, Rfl: 1    aspirin (ECOTRIN LOW STRENGTH) 81 mg EC tablet, Take 81 mg by mouth daily Resume on 8/14, Disp: , Rfl:     atorvastatin (LIPITOR) 80 mg tablet, Take 1 tablet (80 mg total) by mouth daily with dinner, Disp: 90 tablet, Rfl: 1    Blood Glucose Monitoring Suppl (ONE TOUCH ULTRA MINI) w/Device KIT, by Does not apply route 3 (three) times a day, Disp: 1 each, Rfl: 0    Blood Pressure Monitoring (BLOOD PRESSURE CUFF) MISC, Use to check blood pressure before taking blood pressure medication and 1 hour after and follow instructions provided in discharge instructions based on the readings  , Disp: 1 each, Rfl: 0    carvedilol (COREG) 6 25 mg tablet, Take 1 tablet (6 25 mg total) by mouth 2 (two) times a day with meals, Disp: 180 tablet, Rfl: 1    cholecalciferol 53611 units TABS, Take 1 tablet (50,000 Units total) by mouth once a week, Disp: 5 tablet, Rfl: 5    CVS GLUCOSE 4-6 GM-MG, CHEW 3 TABLETS BY MOUTH DAILY AS NEEDED, Disp: , Rfl: 0    CVS VITAMIN B-12 1000 MCG tablet, TAKE 1 TABLET BY MOUTH EVERY DAY, Disp: 30 tablet, Rfl: 5    diclofenac sodium (VOLTAREN) 1 %, Apply 2 g topically 3 (three) times a day, Disp: 100 g, Rfl: 1    escitalopram (LEXAPRO) 5 mg tablet, Take 1 tablet (5 mg total) by mouth daily, Disp: 30 tablet, Rfl: 2    famotidine (PEPCID) 20 mg tablet, Take 20 mg by mouth daily Resume on 8/14, Disp: , Rfl:     glucose 4 g chewable tablet, Chew 3 tablets (12 g total) as needed for low blood sugar, Disp: 50 tablet, Rfl: 0    glucose blood (ONE TOUCH ULTRA TEST) test strip, 1 each by Other route 3 (three) times a day Use as instructed, Disp: 270 each, Rfl: 1    Incontinence Supplies (MALE URINAL) MISC, by Does not apply route daily, Disp: 6 each, Rfl: 3    insulin aspart (NovoLOG) 100 units/mL injection, Inject 8 Units under the skin 3 (three) times a day before meals plus scale  Scale - 150-200 -2 units, 201-250-4 units, 251-300 -6 units, 301-350-8 units, > 350- 10 units (Patient taking differently: Inject 12 Units under the skin 3 (three) times a day before meals plus scale  Scale - 150-200 -2 units, 201-250-4 units, 251-300 -6 units, 301-350-8 units, > 350- 10 units ), Disp: 50 mL, Rfl: 1    insulin glargine (LANTUS) 100 units/mL subcutaneous injection, Inject 25 Units under the skin daily (Patient taking differently: Inject 30 Units under the skin daily  ), Disp: 10 mL, Rfl: 0    Insulin Syringe-Needle U-100 (B-D INS SYR ULTRAFINE  3CC/30G) 30G X 1/2" 0 3 ML MISC, by Does not apply route 4 (four) times a day, Disp: 360 each, Rfl: 1    Insulin Syringe-Needle U-100 (B-D INS SYRINGE 0 5CC/30GX1/2") 30G X 1/2" 0 5 ML MISC, Inject under the skin 4 (four) times a day, Disp: 360 each, Rfl: 1    lactulose 20 g/30 mL, Take 30 mL (20 g total) by mouth daily as needed (constipation), Disp: 473 mL, Rfl: 0    LUCENTIS 0 3 MG/0 05ML, , Disp: , Rfl:     neomycin-bacitracin-polymyxin b (NEOSPORIN) ointment, Apply 1 application topically 2 (two) times a day Apply twice per day to shin wound until fully healed  If not fully healed in 5 days contact your family doctor   Next application is the evening of 8/13, Disp: , Rfl:     ondansetron (ZOFRAN-ODT) 4 mg disintegrating tablet, Take 1 tablet (4 mg total) by mouth every 6 (six) hours as needed for nausea or vomiting, Disp: 20 tablet, Rfl: 0    ONETOUCH DELICA LANCETS 65G MISC, by Does not apply route 3 (three) times a day, Disp: 270 each, Rfl: 1    gabapentin (NEURONTIN) 250 mg/5 mL solution, Take 12 mL (600 mg total) by mouth daily at bedtime, Disp: 470 mL, Rfl: 2    Past Surgical History:   Procedure Laterality Date    ABDOMINAL SURGERY      CHOLECYSTECTOMY      Percutaneous    OTHER SURGICAL HISTORY      "stimulator to control bowel movements"    PA ESOPHAGOGASTRODUODENOSCOPY TRANSORAL DIAGNOSTIC N/A 9/27/2016    Procedure: ESOPHAGOGASTRODUODENOSCOPY (EGD); Surgeon: Rudy Concepcion MD;  [facility];   Service: Gastroenterology    PA LAP,CHOLECYSTECTOMY N/A 2/29/2016    Procedure: LAPAROSCOPIC CHOLECYSTECTOMY ;  Surgeon: Mini Thomas DO;  [facility];  Service: General    ROTATOR CUFF REPAIR Right     TOE AMPUTATION Right 10/28/2016    Procedure: 3RD TOE AMPUTATION ;  Surgeon: Diana Cao DPM;  [facility];  Service:        Patient Active Problem List    Diagnosis Date Noted    Anxiety associated with depression 02/28/2019    Cellulitis 02/14/2019    TIA (transient ischemic attack) 10/28/2018    Stroke-like symptoms, with right-sided weakness 10/28/2018    Hypoglycemia, transient episode 10/28/2018    History of stroke 10/04/2018    Abnormal EEG 09/24/2018    Other constipation 08/17/2018    GERD (gastroesophageal reflux disease) 08/13/2018    Diabetic macular edema ([address] ) 08/09/2018    Cognitive impairment 08/03/2018    Elevated alkaline phosphatase level 08/03/2018    CKD (chronic kidney disease) 08/03/2018    Cerebrovascular accident (CVA) due to thrombosis of left middle cerebral artery ([address] ) 07/29/2018    Benign hypertension with CKD (chronic kidney disease) stage III ([address] ) 10/25/2016    Cirrhosis ([facility] [address] ) 08/17/2016    Type 2 diabetes mellitus with hyperglycemia, with long-term current use of insulin ([address] ) 02/26/2016    Mixed hyperlipidemia 02/26/2016    Diabetic polyneuropathy associated with type 2 diabetes mellitus ([facility] [address] ) 01/26/2016

## 2019-03-07 NOTE — TELEPHONE ENCOUNTER
Informed patient's wife that Dr Heriberto Baker reviewed FTD results and patient should have a [place] road test and should stop driving until they assess him  Ирина emailing [employer] form to Dr Heriberto Baker for signature  Patient has an appt with Dr Saleh Reading today

## 2019-03-08 ENCOUNTER — TELEPHONE (OUTPATIENT)
Dept: ENDOCRINOLOGY | Facility: CLINIC | Age: 59
End: 2019-03-08

## 2019-03-08 LAB
LEFT EYE DIABETIC RETINOPATHY: NORMAL
RIGHT EYE DIABETIC RETINOPATHY: NORMAL

## 2019-03-08 PROCEDURE — 2022F DILAT RTA XM EVC RTNOPTHY: CPT | Performed by: INTERNAL MEDICINE

## 2019-03-11 ENCOUNTER — APPOINTMENT (OUTPATIENT)
Dept: OCCUPATIONAL THERAPY | Facility: CLINIC | Age: 59
End: 2019-03-11
Payer: COMMERCIAL

## 2019-03-11 ENCOUNTER — APPOINTMENT (OUTPATIENT)
Dept: PHYSICAL THERAPY | Facility: CLINIC | Age: 59
End: 2019-03-11
Payer: COMMERCIAL

## 2019-03-11 ENCOUNTER — TELEPHONE (OUTPATIENT)
Dept: ENDOCRINOLOGY | Facility: CLINIC | Age: 59
End: 2019-03-11

## 2019-03-11 NOTE — TELEPHONE ENCOUNTER
Reviewed eye exam note  Has diabetic macular edema and proliferative diabetic retinopathy bilaterally    Lissette Brennan PA-C

## 2019-03-12 ENCOUNTER — OFFICE VISIT (OUTPATIENT)
Dept: NEPHROLOGY | Facility: CLINIC | Age: 59
End: 2019-03-12
Payer: COMMERCIAL

## 2019-03-12 VITALS
SYSTOLIC BLOOD PRESSURE: 136 MMHG | BODY MASS INDEX: 34.36 KG/M2 | WEIGHT: 240 LBS | DIASTOLIC BLOOD PRESSURE: 60 MMHG | HEIGHT: 70 IN | HEART RATE: 80 BPM

## 2019-03-12 DIAGNOSIS — I12.9 BENIGN HYPERTENSION WITH CKD (CHRONIC KIDNEY DISEASE) STAGE IV (HCC): ICD-10-CM

## 2019-03-12 DIAGNOSIS — N18.4 BENIGN HYPERTENSION WITH CKD (CHRONIC KIDNEY DISEASE) STAGE IV (HCC): ICD-10-CM

## 2019-03-12 DIAGNOSIS — N18.4 STAGE 4 CHRONIC KIDNEY DISEASE (HCC): Primary | ICD-10-CM

## 2019-03-12 DIAGNOSIS — R80.1 PERSISTENT PROTEINURIA: ICD-10-CM

## 2019-03-12 PROCEDURE — 99214 OFFICE O/P EST MOD 30 MIN: CPT | Performed by: PHYSICIAN ASSISTANT

## 2019-03-12 NOTE — PROGRESS NOTES
Assessment and Plan:    Yessy Lux was seen today for follow-up and chronic kidney disease  Diagnoses and all orders for this visit:    Stage 4 chronic kidney disease ([facility] [address] )  -     Ambulatory referral to ckd education program; Future  -     Basic metabolic panel; Future  -     Magnesium; Future  -     Phosphorus; Future  -     Protein / creatinine ratio, urine; Future  -     PTH, intact; Future  -     Vitamin D 25 hydroxy; Future  -     CBC; Future    Benign hypertension with CKD (chronic kidney disease) stage IV (HCC)    Persistent proteinuria       Chronic Kidney Disease stage IV- Baseline creatinine has been progressive and is now in the mid 2s  Etiology is related to diabetic nephropathy and hypertensive nephrosclerosis  Please attend a kidney smart class  Patient and family would want dialysis in the future when needed  Kidney Smart: will schedule    Hypertension- Antihypertensive regimen includes amlodipine 5mg twice a day and carvedilol 6 25mg twice a day  Unfortunately, they did not bring their medication list to confirm  Avoid salt in your diet  Avoid nonsteroidals  Proteinuria- UPC ratio is 7 6g  UPEP negative  SPEP negative  FLC ratio elevated at 2 72  Hematuria- suspect secondary to diabetic nephropathy  Serologies negative including SOLEDAD, ANCA, C3, C4  Bone Mineral Disorder- check studies before your next appointment    Diabetes with neuropathy, nephropathy, and retinopathy- HbA1c is 7 5 from January  Follow up with Dr Belle Brown in 3 months  Please call the office with any questions or concerns  Reason for Visit: Follow-up and Chronic Kidney Disease    HPI: Joce Dixon is a 61 y o  male who is here for follow up of chronic kidney disease  He was last seen in the office by Dr Belle Brown 1 year ago  He was hospitalized in February with broken toes and cellulitis  He had a hospitalization in August 2018 for an acute CVA which was complicated by a small intracranial hemorrhage    He has also had multiple ER visits  He has swelling in both legs for the past month  He denies N/V  He does not follow a low sodium diet  He denies changes in urination  His wife is present for the appointment and I called their son Marine Listen to let him know of his father's worsening renal disease  ROS: A complete review of systems was performed and was negative unless otherwise noted in the history of present illness  Allergies:   Patient has no known allergies  Medications:     Current Outpatient Medications:     amLODIPine (NORVASC) 5 mg tablet, Take 1 tablet (5 mg total) by mouth every 12 (twelve) hours, Disp: 180 tablet, Rfl: 1    aspirin (ECOTRIN LOW STRENGTH) 81 mg EC tablet, Take 81 mg by mouth daily Resume on 8/14, Disp: , Rfl:     atorvastatin (LIPITOR) 80 mg tablet, Take 1 tablet (80 mg total) by mouth daily with dinner, Disp: 90 tablet, Rfl: 1    Blood Glucose Monitoring Suppl (ONE TOUCH ULTRA MINI) w/Device KIT, by Does not apply route 3 (three) times a day, Disp: 1 each, Rfl: 0    Blood Pressure Monitoring (BLOOD PRESSURE CUFF) MISC, Use to check blood pressure before taking blood pressure medication and 1 hour after and follow instructions provided in discharge instructions based on the readings  , Disp: 1 each, Rfl: 0    carvedilol (COREG) 6 25 mg tablet, Take 1 tablet (6 25 mg total) by mouth 2 (two) times a day with meals, Disp: 180 tablet, Rfl: 1    cholecalciferol 85436 units TABS, Take 1 tablet (50,000 Units total) by mouth once a week, Disp: 5 tablet, Rfl: 5    CVS GLUCOSE 4-6 GM-MG, CHEW 3 TABLETS BY MOUTH DAILY AS NEEDED, Disp: , Rfl: 0    CVS VITAMIN B-12 1000 MCG tablet, TAKE 1 TABLET BY MOUTH EVERY DAY, Disp: 30 tablet, Rfl: 5    diclofenac sodium (VOLTAREN) 1 %, Apply 2 g topically 3 (three) times a day, Disp: 100 g, Rfl: 1    escitalopram (LEXAPRO) 5 mg tablet, Take 1 tablet (5 mg total) by mouth daily, Disp: 30 tablet, Rfl: 2    famotidine (PEPCID) 20 mg tablet, Take 20 mg by mouth daily Resume on 8/14, Disp: , Rfl:     gabapentin (NEURONTIN) 250 mg/5 mL solution, Take 12 mL (600 mg total) by mouth daily at bedtime, Disp: 470 mL, Rfl: 2    glucose 4 g chewable tablet, Chew 3 tablets (12 g total) as needed for low blood sugar, Disp: 50 tablet, Rfl: 0    glucose blood (ONE TOUCH ULTRA TEST) test strip, 1 each by Other route 3 (three) times a day Use as instructed, Disp: 270 each, Rfl: 1    insulin aspart (NovoLOG) 100 units/mL injection, Inject 8 Units under the skin 3 (three) times a day before meals plus scale  Scale - 150-200 -2 units, 201-250-4 units, 251-300 -6 units, 301-350-8 units, > 350- 10 units (Patient taking differently: Inject 12 Units under the skin 3 (three) times a day before meals plus scale  Scale - 150-200 -2 units, 201-250-4 units, 251-300 -6 units, 301-350-8 units, > 350- 10 units ), Disp: 50 mL, Rfl: 1    insulin glargine (LANTUS) 100 units/mL subcutaneous injection, Inject 25 Units under the skin daily (Patient taking differently: Inject 30 Units under the skin daily  ), Disp: 10 mL, Rfl: 0    Insulin Syringe-Needle U-100 (B-D INS SYR ULTRAFINE  3CC/30G) 30G X 1/2" 0 3 ML MISC, by Does not apply route 4 (four) times a day, Disp: 360 each, Rfl: 1    Insulin Syringe-Needle U-100 (B-D INS SYRINGE 0 5CC/30GX1/2") 30G X 1/2" 0 5 ML MISC, Inject under the skin 4 (four) times a day, Disp: 360 each, Rfl: 1    lactulose 20 g/30 mL, Take 30 mL (20 g total) by mouth daily as needed (constipation), Disp: 473 mL, Rfl: 0    LUCENTIS 0 3 MG/0 05ML, , Disp: , Rfl:     neomycin-bacitracin-polymyxin b (NEOSPORIN) ointment, Apply 1 application topically 2 (two) times a day Apply twice per day to shin wound until fully healed  If not fully healed in 5 days contact your family doctor   Next application is the evening of 8/13, Disp: , Rfl:     ondansetron (ZOFRAN-ODT) 4 mg disintegrating tablet, Take 1 tablet (4 mg total) by mouth every 6 (six) hours as needed for nausea or vomiting, Disp: 20 tablet, Rfl: 0    ONETOUCH DELICA LANCETS 81W MISC, by Does not apply route 3 (three) times a day, Disp: 270 each, Rfl: 1    Incontinence Supplies (MALE URINAL) MISC, by Does not apply route daily, Disp: 6 each, Rfl: 3    Past Medical History:   Diagnosis Date    Diabetes mellitus ([facility] [address] )     Hypercholesteremia     Hyperlipidemia     Hypertension     Neuropathy     Obesity     Osteomyelitis ([facility] [address] )     last assessed 11/4/16    PVC's (premature ventricular contractions)     sees cardiology Dr Chris camargo    Stroke [facility])     last weeof July 2018 [address] [facility]     Past Surgical History:   Procedure Laterality Date    ABDOMINAL SURGERY      CHOLECYSTECTOMY      Percutaneous    OTHER SURGICAL HISTORY      "stimulator to control bowel movements"    ND ESOPHAGOGASTRODUODENOSCOPY TRANSORAL DIAGNOSTIC N/A 9/27/2016    Procedure: ESOPHAGOGASTRODUODENOSCOPY (EGD); Surgeon: Shubham Rose MD;  [facility]; Service: Gastroenterology    ND LAP,CHOLECYSTECTOMY N/A 2/29/2016    Procedure: LAPAROSCOPIC CHOLECYSTECTOMY ;  Surgeon: Anthony Caceres DO;  [facility];  Service: General    ROTATOR CUFF REPAIR Right     TOE AMPUTATION Right 10/28/2016    Procedure: 3RD TOE AMPUTATION ;  Surgeon: Serena Harrison DPM;  [facility];  Service:      Family History   Problem Relation Age of Onset    Leukemia Mother     Liver disease Mother     Lung cancer Mother         heavy smoker - 3 ppd    Heart disease Father     Liver disease Father     Multiple myeloma Sister     Breast cancer Sister     Urolithiasis Family     Alcohol abuse Neg Hx     Depression Neg Hx     Drug abuse Neg Hx     Substance Abuse Neg Hx     Mental illness Neg Hx       reports that he has never smoked  He has never used smokeless tobacco  He reports that he does not drink alcohol or use drugs      Physical Exam:   Vitals:    03/12/19 1441 03/12/19 1500   BP:  136/60   BP Location:  Left arm   Patient Position:  Sitting   Cuff Size:  Standard   Pulse:  80   Weight: 109 kg (240 lb)    Height: 5' 10" (1 778 m)      Body mass index is 34 44 kg/m²  General: NAD  Neuro: AAO  Neck: supple  Skin: no rash  Heart: RRR  Lungs: CTAB  Abdomen: soft nt nd  Extremities: trace edema    Procedure:  No results found for this or any previous visit  Labs reviewed      Lab Results   Component Value Date    GLUCOSE 91 10/28/2018    CALCIUM 9 2 03/06/2019     08/10/2016    K 4 5 03/06/2019    CO2 24 03/06/2019     03/06/2019    BUN 39 (H) 03/06/2019    CREATININE 2 42 (H) 02/27/2019       Results from last 7 days   Lab Units 03/06/19  1436   POTASSIUM mmol/L 4 5   CHLORIDE mmol/L 105   CO2 mmol/L 24   BUN mg/dL 39*   SL AMB CALCIUM mg/dL 9 2

## 2019-03-12 NOTE — PATIENT INSTRUCTIONS
Chronic Kidney Disease stage IV- Baseline creatinine has been progressive and is now in the mid 2s  Etiology is related to diabetic nephropathy and hypertensive nephrosclerosis  Please attend a kidney smart class  Kidney Smart: will schedule    Hypertension- Antihypertensive regimen includes amlodipine 5mg twice a day and carvedilol 6 25mg twice a day  Unfortunately, they did not bring their medication list to confirm  Avoid salt in your diet  Avoid nonsteroidals  Proteinuria- UPC ratio is 7 6g  UPEP negative  SPEP negative  FLC ratio elevated at 2 72  Hematuria- suspect secondary to diabetic nephropathy  Serologies negative including SOLEDAD, ANCA, C3, C4  Bone Mineral Disorder- check studies before your next appointment    Diabetes with neuropathy, nephropathy, and retinopathy- HbA1c is 7 5 from January  Follow up with Dr Isac Al in 3 months  Please call the office with any questions or concerns

## 2019-03-13 ENCOUNTER — OFFICE VISIT (OUTPATIENT)
Dept: PHYSICAL THERAPY | Facility: CLINIC | Age: 59
End: 2019-03-13
Payer: COMMERCIAL

## 2019-03-13 ENCOUNTER — OFFICE VISIT (OUTPATIENT)
Dept: OCCUPATIONAL THERAPY | Facility: CLINIC | Age: 59
End: 2019-03-13
Payer: COMMERCIAL

## 2019-03-13 DIAGNOSIS — I63.9 CEREBROVASCULAR ACCIDENT (CVA), UNSPECIFIED MECHANISM (HCC): Primary | ICD-10-CM

## 2019-03-13 DIAGNOSIS — Z86.73 HISTORY OF STROKE: ICD-10-CM

## 2019-03-13 DIAGNOSIS — M25.531 RIGHT WRIST PAIN: Primary | ICD-10-CM

## 2019-03-13 DIAGNOSIS — E53.8 B12 DEFICIENCY: ICD-10-CM

## 2019-03-13 PROCEDURE — 97530 THERAPEUTIC ACTIVITIES: CPT

## 2019-03-13 PROCEDURE — 97140 MANUAL THERAPY 1/> REGIONS: CPT | Performed by: PHYSICAL THERAPIST

## 2019-03-13 PROCEDURE — 97110 THERAPEUTIC EXERCISES: CPT

## 2019-03-13 NOTE — PROGRESS NOTES
Daily Note     Today's date: 3/13/2019  Patient name: Candace Kapoor  : 1960  MRN: [id number]  Referring provider: Alcon Mai MD  Dx:   Encounter Diagnosis     ICD-10-CM    1  Cerebrovascular accident (CVA), unspecified mechanism ([facility] [address] ) I63 9                 Subjective: Patient reported 5/10 in the UT and upper neck area  Objective: See treatment diary below  -Modalities: Moist heat to UT/upper neck area for 10 minutes  -TRP B/L SCM, UT , splenius capitis, suboccipital release, x15 minutes   *Patient noted that he needed to be out of therapy at 5:15pm  due to personal reasons  Only manuals completed this time due UT/neck pain and time constraint  Assessment:  Patient tolerated treatment session well today  Post manuals patient reported minimal relief from neck pain with reported 4-5/10  Pt requires continued skilled PT services  to further improve balance and reduce fall risk, as well as improve neck pain       Plan: Continue per plan of care per patient tolerance

## 2019-03-13 NOTE — PROGRESS NOTES
Daily Note     Today's date: 3/13/2019  Patient name: Natalee Escobedo  : 1960  MRN: [id number]  Referring provider: Martita Kelly DO  Dx:   Encounter Diagnosis     ICD-10-CM    1  Right wrist pain M25 531    2  History of stroke Z86 73                   Subjective:  8/10 right forearm       Objective:   Completed Hot pack for warm up, manual therapy, exercises and activities to increase ROM, strength, coordination and function  Continues to complain of increased pain  See treatment diary below-  Status remain the same    Precautions TIA    Specialty Daily Treatment Diary     Manual  3/13/19 2/25/19 2/27/19 3/4/19 3/6/19   graston To forearm To forearm To forearm To forearm To forearm                                       Exercise Diary  3/13/19 2/25/19 2/27/19 3/4/19 3/6/19   Clothes pegs Firm 2 x 24 Firm 2 x 24 Firm 2 x 24 Firm 2 x 24 2 x 24   dice 3 x 12  3 x 12 3 x 12 3 x 12   Gripper   Y3 x 15 Y3 x 20 Y3 x 25 Y3 x 25   Small pegs    Key pegs 2 x 21    Wrist weight  Flex 3lb x 15  Ext 3lb x 15  Dev 3lb x 15 Flex 3lb x 12  Ext 3lb x 12  Dev 3lb x 12 Flex 3lb x 15  Ext 2lb x 15  Dev 2lb x 15 Flex 3lb x 15  Ext 3lb x 15  Dev 3lb x 15 Flex 3lb x 15  Ext 3lb x 15  Dev 3lb x 15   Pinching clothes peg with marble  15      thera bar bends    [place] bar x 15 Orange bar x 15   Weight pulley     tricep #3 x 12  IE # 3 x 12  ER #3 x 12  Prot #3 x 12  Bicep #2 x 12                                                                                                       Modalities 3/13/19 2/25/19 2/27/19 3/4/19 3/6/19   Moist heat X ~ 5 mins X ~ 5 mins X~ 5 mins X ~ 5 mins X ~ 5 mins                         Assessment: Tolerated treatment fair  Patient would benefit from continued OT- difficulty with following directions continue to report increased pain-       Plan: Continue per plan of care

## 2019-03-15 RX ORDER — OMEGA-3/DHA/EPA/FISH OIL 35-113.5MG
TABLET,CHEWABLE ORAL
Qty: 30 TABLET | Refills: 5 | Status: SHIPPED | OUTPATIENT
Start: 2019-03-15 | End: 2019-03-25 | Stop reason: SDUPTHER

## 2019-03-18 DIAGNOSIS — E55.9 VITAMIN D DEFICIENCY: ICD-10-CM

## 2019-03-18 RX ORDER — CHOLECALCIFEROL (VITAMIN D3) 1250 MCG
CAPSULE ORAL
Qty: 5 CAPSULE | Refills: 5 | Status: SHIPPED | OUTPATIENT
Start: 2019-03-18 | End: 2019-10-11 | Stop reason: SDUPTHER

## 2019-03-19 ENCOUNTER — OFFICE VISIT (OUTPATIENT)
Dept: PHYSICAL THERAPY | Facility: CLINIC | Age: 59
End: 2019-03-19
Payer: COMMERCIAL

## 2019-03-19 ENCOUNTER — OFFICE VISIT (OUTPATIENT)
Dept: OCCUPATIONAL THERAPY | Facility: CLINIC | Age: 59
End: 2019-03-19
Payer: COMMERCIAL

## 2019-03-19 DIAGNOSIS — M25.531 RIGHT WRIST PAIN: Primary | ICD-10-CM

## 2019-03-19 DIAGNOSIS — Z86.73 HISTORY OF STROKE: ICD-10-CM

## 2019-03-19 DIAGNOSIS — I63.9 CEREBROVASCULAR ACCIDENT (CVA), UNSPECIFIED MECHANISM (HCC): Primary | ICD-10-CM

## 2019-03-19 PROCEDURE — G8978 MOBILITY CURRENT STATUS: HCPCS | Performed by: PHYSICAL THERAPIST

## 2019-03-19 PROCEDURE — 97110 THERAPEUTIC EXERCISES: CPT | Performed by: PHYSICAL THERAPIST

## 2019-03-19 PROCEDURE — G8979 MOBILITY GOAL STATUS: HCPCS | Performed by: PHYSICAL THERAPIST

## 2019-03-19 PROCEDURE — 97140 MANUAL THERAPY 1/> REGIONS: CPT

## 2019-03-19 PROCEDURE — 97530 THERAPEUTIC ACTIVITIES: CPT

## 2019-03-19 PROCEDURE — 97116 GAIT TRAINING THERAPY: CPT | Performed by: PHYSICAL THERAPIST

## 2019-03-19 PROCEDURE — 97110 THERAPEUTIC EXERCISES: CPT

## 2019-03-19 PROCEDURE — 97112 NEUROMUSCULAR REEDUCATION: CPT | Performed by: PHYSICAL THERAPIST

## 2019-03-19 NOTE — PROGRESS NOTES
PT Progress Note    Today's date: 3/19/2019  Patient name: Scarlet Kearns  : 1960  MRN: [id number]  Referring provider: Mathew Campos MD  Dx:   Encounter Diagnosis     ICD-10-CM    1  Cerebrovascular accident (CVA), unspecified mechanism ([facility] [address] ) I63 9                   Assessment  Assessment details: Pt is 63 y/o male presenting to skilled PT s/p CVA in 2018  Pt has demonstrated progress in all objective measures since initial eval but he continues to score below age norms for objective testing  Pt has made improvements in cervical ROM and decreased pain levels, although not completely resolved  His gait pattern has improved as well but continues to have lateral path deviation  Plan to focus more on balance/gait training and less on manual therapy now that cervical ROM and pain has improved  Based on above findings pt requires skilled PT services to reduce fall risk, improve balance, and maximize safety with all functional mobility  Impairments: abnormal gait, activity intolerance, impaired balance, impaired physical strength and lacks appropriate home exercise program  Understanding of Dx/Px/POC: good   Prognosis: good    Goals  Goals  STG 30 days     1  Patient will achieve 190 feet improvement with overall distance achieved of 1,090 feet with 6 minute walk test which is Minimal Detectable Change pre current research standards with endurance to demonstrate enhance functional capacity  - NOT MET (1,000 FT)  2  Patient will display 2 3  second improvement with overall score of 12 5 seconds  with TUG test or lower with noted improvement being Minimal Detectable Change pre current research standards with fall risk  -MET   3  Patient will achieve 44/56 BURGER score with minimal improve by 6 points or more demonstrating Minimal Detectable change per current research standards for this objective test which assess fall risk  -NOT MET (42/56)  4  Patient will achieve   59 ft/sec improvement with score of  3 9 ft/sec with 10 Meter Walk test, demonstrating Minimal Detectable change per current research standards for this objective test which assess fall risk  NOT MET   5  Patient will perform  5 x sit to stand test with overall reduction by seconds to 13 sec score indicating improvement with functional endurance  NOT MET (14 sec)    Goals set on   1  Pt will display increase in cervical lateral flexion and rotation ROM by 10-15 degrees  - MET  2  Pt will display normal soft tissue density t/o cervical spine  PARTIALLY MET  3  Pt will report decrease in neck pain by 2 levels on numeric rating pain scale  MET      LT days   1  Patient will score low risk for falls with 3/4 fall risk measures   2  Patient will be able to ambulate 1,400 feet without AD during 6 minute walk test   3  Patient will be able to perform floor transfer without physical assistance   4  Patient will be able to ambulate outdoors without any loss of balance  5  Patient will improve DGI score to > 19 /24 to reduce fall risk and maximize safety with ambulatory tasks       Cut off score   All date taken from [facility] Neuro Section or Rehab Measures    BURGER test: 46/56                                              5 x STS Test:  MDC: 6 points                                                  MDC: 2 3 seconds   age norms                                                                 Age Norms   61-76 year old = M: 54, F: 55                        62-78 year old: 11 4 seconds   66-77 year old = M 47,  F: 50                       71-76 year old: 12 6 seconds    80-80 year old = M46,   F: 53                       80-80 year old: 14 8 seconds     TUG test:                                                                     10 Meter Walk Test:  MDC: 4 14 seconds       MDC:  59 ft/sec  Cut off score for Falls                                                  Age Norms  > 13 5 seconds community dwelling adults                20-29; M: 4 56 ft/sec F: 4 62 ft/sec  > 32 2 Frail Elderly                                                     30-39: M 4 76 ft/sec  F: 4 68 ft/sec          40-49: M: 4 79 ft/sec  F: 4 62 ft/sec  6 Minute Walk Test      50-59: M: 4 76 ft/sec  F: 4 56 ft/sec  MDC: 190 feet       60-69: M: 4 56 ft/sec  F: 4 26 ft/sec  Age Norms       70-+    M: 4 36 ft/sec  F: 4 16 ft/sec  60-69:    M: 1876 F: 6102  86-83:    M: 1729 F: 9178  80-89 +: M: 80 F; 1286     Plan  Patient would benefit from: skilled physical therapy  Planned modality interventions: biofeedback, thermotherapy: hydrocollator packs, TENS and cryotherapy  Planned therapy interventions: abdominal trunk stabilization, activity modification, balance, body mechanics training, community reintegration, coordination, flexibility, functional ROM exercises, gait training, graded activity, graded exercise, graded motor, home exercise program, transfer training, therapeutic training, therapeutic exercise, therapeutic activities, stretching, strengthening, sensory integrative techniques, postural training, patient education and neuromuscular re-education  Frequency: 2x week  Duration in weeks: 12  Plan of Care beginning date: 2019  Plan of Care expiration date: 4/15/2019  Treatment plan discussed with: patient        Subjective Evaluation    History of Present Illness  Mechanism of injury: Pt reports noticing improvements since starting PT  Feels like he still needs more manual therapy for cervical spine to increase ROM and decrease pain  Pt is following up with podiatrist on Friday to see if he can start wearing his shoe again  Pt thinks his balance is ok, denies recent falls       Quality of life: good    Pain  No pain reported  Current pain ratin  At best pain ratin  At worst pain rating: 10  Location: neck  Quality: dull ache    Social Support  Steps to enter house: yes  Stairs in house: no   Lives in: [place]  Lives with: spouse    Treatments  Previous treatment: physical therapy  Patient Goals  Patient goal: "get back to normal" , improve balance and strength , reduce neck pain         Objective     Functional Assessment        Comments  LLE strength 3+/5  RLE strength 4/5    5x STS= 16 sec  TUG= 14 sec no AD  Cabrera= 38/56  Gait speed= 33 ft / 10 sec = 3 3 ft/sec  6MWT= 900 ft   DGI= 16/24  Gait assessment: decreased arm swing, inconsistent lubna     Cervical AROM assessment (1/31/19)  Flexion 55 degrees  Extension 40 degrees  L rotation 15 degrees (pain)  L sidebend 25 degrees (pain)  R rotation 39 degrees  R sidebend 25 degrees (pain)    Progress Note 3/19  5x STS= 14 sec   TUG= 10 sec no AD  Cabrera= 42/56  Gait speed= 33 ft/ 9 sec= 3 67 ft/sec  6MWT= 1,000 ft  DGI= 20/24  Gait assessment: improved arm swing and lubna, but more path deviation    Cervical AROM assessment:  Flexion 55 degrees  Extension 45 degrees  L rotation 45 degrees  L sidebend 30 degrees  R rotation 60 degrees  R sidebend 30 degrees        Flowsheet Rows      Most Recent Value   PT/OT G-Codes   Current Score  58   Projected Score  60   FOTO information reviewed  Yes   Assessment Type  Re-evaluation   G code set  Mobility: Walking & Moving Around   Mobility: Walking and Moving Around Current Status ()  CK   Mobility: Walking and Moving Around Goal Status ()  CJ          Precautions: falls, dysarthria    Interventions 3/19  Scap retractions 10 reps, 5 sec hold  Chin tucks - difficulty performing in seated position

## 2019-03-19 NOTE — PROGRESS NOTES
Daily Note     Today's date: 3/19/2019  Patient name: Syed Kc  : 1960  MRN: [id number]  Referring provider: Manan Weller DO  Dx:   Encounter Diagnosis     ICD-10-CM    1  Right wrist pain M25 531    2  History of stroke Z86 73                   Subjective:  4/10 right forearm - medley-baker scale      Objective:   Completed Hot pack for warm up, manual therapy, exercises and activities to increase ROM, strength, coordination and function  Continues to complain of increased pain  See treatment diary below-  Status remain the same    Precautions TIA    Specialty Daily Treatment Diary     Manual  3/13/19 3/19/19 2/27/19 3/4/19 3/6/19   graston To forearm To forearm To forearm To forearm To forearm                                       Exercise Diary  3/13/19 3/19/19 2/27/19 3/4/19 3/6/19   Clothes pegs Firm 2 x 24 Firm 2 x 24 Firm 2 x 24 Firm 2 x 24 2 x 24   dice 3 x 12  3 x 12 3 x 12 3 x 12   Gripper   Y3 x 20 Y3 x 20 Y3 x 25 Y3 x 25   Small pegs    Key pegs 2 x 21    Wrist weight  Flex 3lb x 15  Ext 3lb x 15  Dev 3lb x 15 Flex 3lb x 12  Ext 3lb x 12  Dev 3lb x 12 Flex 3lb x 15  Ext 2lb x 15  Dev 2lb x 15 Flex 3lb x 15  Ext 3lb x 15  Dev 3lb x 15 Flex 3lb x 15  Ext 3lb x 15  Dev 3lb x 15   Pinching clothes peg with marble  15      thera bar bends    Mylo bar x 15 Orange bar x 15   Weight pulley  tricep #3 x 15  IE # 3 x 15  ER #3 x 15  Prot #3 x 15  Bicep #2 x 15   tricep #3 x 12  IE # 3 x 12  ER #3 x 12  Prot #3 x 12  Bicep #2 x 12                                                                                                       Modalities 3/13/19 3/19/19 2/27/19 3/4/19 3/6/19   Moist heat X ~ 5 mins X ~ 5 mins X~ 5 mins X ~ 5 mins X ~ 5 mins                         Assessment: Tolerated treatment fair  Patient would benefit from continued OT- reduced pain today after manual      Plan: Continue per plan of care

## 2019-03-20 ENCOUNTER — APPOINTMENT (OUTPATIENT)
Dept: OCCUPATIONAL THERAPY | Facility: CLINIC | Age: 59
End: 2019-03-20
Payer: COMMERCIAL

## 2019-03-20 ENCOUNTER — APPOINTMENT (OUTPATIENT)
Dept: PHYSICAL THERAPY | Facility: CLINIC | Age: 59
End: 2019-03-20
Payer: COMMERCIAL

## 2019-03-25 DIAGNOSIS — E53.8 B12 DEFICIENCY: ICD-10-CM

## 2019-03-26 ENCOUNTER — HOSPITAL ENCOUNTER (OUTPATIENT)
Dept: NON INVASIVE DIAGNOSTICS | Facility: CLINIC | Age: 59
Discharge: HOME | End: 2019-03-26
Payer: COMMERCIAL

## 2019-03-26 DIAGNOSIS — I65.23 CAROTID ATHEROSCLEROSIS, BILATERAL: ICD-10-CM

## 2019-03-26 PROCEDURE — 93880 EXTRACRANIAL BILAT STUDY: CPT

## 2019-03-27 ENCOUNTER — APPOINTMENT (OUTPATIENT)
Dept: PHYSICAL THERAPY | Facility: CLINIC | Age: 59
End: 2019-03-27
Payer: COMMERCIAL

## 2019-03-27 ENCOUNTER — APPOINTMENT (OUTPATIENT)
Dept: OCCUPATIONAL THERAPY | Facility: CLINIC | Age: 59
End: 2019-03-27
Payer: COMMERCIAL

## 2019-03-27 ENCOUNTER — OFFICE VISIT (OUTPATIENT)
Dept: PHYSICAL THERAPY | Facility: CLINIC | Age: 59
End: 2019-03-27
Payer: COMMERCIAL

## 2019-03-27 DIAGNOSIS — I63.9 CEREBROVASCULAR ACCIDENT (CVA), UNSPECIFIED MECHANISM (HCC): Primary | ICD-10-CM

## 2019-03-27 PROCEDURE — 93880 EXTRACRANIAL BILAT STUDY: CPT | Performed by: SURGERY

## 2019-03-27 PROCEDURE — 97140 MANUAL THERAPY 1/> REGIONS: CPT | Performed by: PHYSICAL THERAPIST

## 2019-03-27 PROCEDURE — 97110 THERAPEUTIC EXERCISES: CPT | Performed by: PHYSICAL THERAPIST

## 2019-03-27 PROCEDURE — 97112 NEUROMUSCULAR REEDUCATION: CPT | Performed by: PHYSICAL THERAPIST

## 2019-03-27 RX ORDER — OMEGA-3/DHA/EPA/FISH OIL 35-113.5MG
TABLET,CHEWABLE ORAL
Qty: 30 TABLET | Refills: 5 | Status: SHIPPED | OUTPATIENT
Start: 2019-03-27 | End: 2019-09-06 | Stop reason: SDUPTHER

## 2019-03-27 NOTE — PROGRESS NOTES
Daily Note     Today's date: 3/27/2019  Patient name: Silvana Reyes  : 1960  MRN: [id number]  Referring provider: Karen Melendez MD  Dx:   Encounter Diagnosis     ICD-10-CM    1  Cerebrovascular accident (CVA), unspecified mechanism ([facility] [address] ) I63 9                 Subjective: Patient reported headache at 5/10 and neck pain 8/10 today  Patient states he is not sure why his neck is bothering him so much  No new complaints this date  Objective: See treatment diary below  -TRP B/L SCM, UT , splenius capitis, suboccipital release, x16 minutes     Solo Step:  -Amb fwd, bwd,lateral with ball toss 4 cycles each way; verbal cues for bigger steps   -Ladder drills: forward with big steps x3 cycles, lateral stepping x2 cycles, forward marches x3 cycles; attempted to trial reciprocal arm swing with stepping however patient had difficulty coordinating opposite arm/opposite leg   - Standing on foam with step taps to cones x24 reps; therapist CGA due to preference to place foot at edge of foam and lose balance   -Hurdles forward and lateral 6"       Assessment:  Patient tolerated treatment session well today with reports of minor fatigue at the conclusion of the session  Increased hypertonicity in the B/L UT and suboccipitals upon palpation with manual treatment  Patient reports slight increased in ROM and relief in headache to 3-4/10  Noted difficulty controlling walking backwards with preference to rely on momentum to move in this direction requiring verbal cues to slow down  Patient continues to have difficulty with cone taps on foam requiring UE  Pt requires continued skilled PT services  to further improve balance and reduce fall risk, as well as improve neck pain       Plan: Continue per plan of care per patient tolerance

## 2019-04-02 ENCOUNTER — OFFICE VISIT (OUTPATIENT)
Dept: PHYSICAL THERAPY | Facility: CLINIC | Age: 59
End: 2019-04-02
Payer: COMMERCIAL

## 2019-04-02 ENCOUNTER — OFFICE VISIT (OUTPATIENT)
Dept: OCCUPATIONAL THERAPY | Facility: CLINIC | Age: 59
End: 2019-04-02
Payer: COMMERCIAL

## 2019-04-02 DIAGNOSIS — Z86.73 HISTORY OF STROKE: ICD-10-CM

## 2019-04-02 DIAGNOSIS — M25.531 RIGHT WRIST PAIN: Primary | ICD-10-CM

## 2019-04-02 DIAGNOSIS — I63.9 CEREBROVASCULAR ACCIDENT (CVA), UNSPECIFIED MECHANISM (HCC): Primary | ICD-10-CM

## 2019-04-02 PROCEDURE — 97110 THERAPEUTIC EXERCISES: CPT

## 2019-04-02 PROCEDURE — 97140 MANUAL THERAPY 1/> REGIONS: CPT | Performed by: PHYSICAL THERAPIST

## 2019-04-02 PROCEDURE — 97530 THERAPEUTIC ACTIVITIES: CPT

## 2019-04-02 PROCEDURE — 97112 NEUROMUSCULAR REEDUCATION: CPT | Performed by: PHYSICAL THERAPIST

## 2019-04-02 PROCEDURE — 97140 MANUAL THERAPY 1/> REGIONS: CPT

## 2019-04-04 ENCOUNTER — APPOINTMENT (OUTPATIENT)
Dept: OCCUPATIONAL THERAPY | Facility: CLINIC | Age: 59
End: 2019-04-04
Payer: COMMERCIAL

## 2019-04-04 ENCOUNTER — APPOINTMENT (OUTPATIENT)
Dept: PHYSICAL THERAPY | Facility: CLINIC | Age: 59
End: 2019-04-04
Payer: COMMERCIAL

## 2019-04-09 ENCOUNTER — OFFICE VISIT (OUTPATIENT)
Dept: OCCUPATIONAL THERAPY | Facility: CLINIC | Age: 59
End: 2019-04-09
Payer: COMMERCIAL

## 2019-04-09 ENCOUNTER — OFFICE VISIT (OUTPATIENT)
Dept: PHYSICAL THERAPY | Facility: CLINIC | Age: 59
End: 2019-04-09
Payer: COMMERCIAL

## 2019-04-09 DIAGNOSIS — M25.531 RIGHT WRIST PAIN: Primary | ICD-10-CM

## 2019-04-09 DIAGNOSIS — I63.9 CEREBROVASCULAR ACCIDENT (CVA), UNSPECIFIED MECHANISM (HCC): Primary | ICD-10-CM

## 2019-04-09 DIAGNOSIS — Z86.73 HISTORY OF STROKE: ICD-10-CM

## 2019-04-09 PROCEDURE — 97112 NEUROMUSCULAR REEDUCATION: CPT | Performed by: PHYSICAL THERAPIST

## 2019-04-09 PROCEDURE — 97140 MANUAL THERAPY 1/> REGIONS: CPT

## 2019-04-09 PROCEDURE — 97140 MANUAL THERAPY 1/> REGIONS: CPT | Performed by: PHYSICAL THERAPIST

## 2019-04-09 PROCEDURE — 97110 THERAPEUTIC EXERCISES: CPT

## 2019-04-10 ENCOUNTER — APPOINTMENT (OUTPATIENT)
Dept: PHYSICAL THERAPY | Facility: CLINIC | Age: 59
End: 2019-04-10
Payer: COMMERCIAL

## 2019-04-10 ENCOUNTER — APPOINTMENT (OUTPATIENT)
Dept: OCCUPATIONAL THERAPY | Facility: CLINIC | Age: 59
End: 2019-04-10
Payer: COMMERCIAL

## 2019-04-10 DIAGNOSIS — E11.649 TYPE 2 DIABETES MELLITUS WITH HYPOGLYCEMIA WITHOUT COMA, WITH LONG-TERM CURRENT USE OF INSULIN (HCC): ICD-10-CM

## 2019-04-10 DIAGNOSIS — Z79.4 TYPE 2 DIABETES MELLITUS WITH HYPOGLYCEMIA WITHOUT COMA, WITH LONG-TERM CURRENT USE OF INSULIN (HCC): ICD-10-CM

## 2019-04-10 RX ORDER — INSULIN GLARGINE 100 [IU]/ML
35 INJECTION, SOLUTION SUBCUTANEOUS DAILY
Qty: 10 ML | Refills: 3 | Status: SHIPPED | OUTPATIENT
Start: 2019-04-10 | End: 2019-05-01

## 2019-04-15 ENCOUNTER — APPOINTMENT (OUTPATIENT)
Dept: OCCUPATIONAL THERAPY | Facility: CLINIC | Age: 59
End: 2019-04-15
Payer: COMMERCIAL

## 2019-04-15 ENCOUNTER — APPOINTMENT (OUTPATIENT)
Dept: PHYSICAL THERAPY | Facility: CLINIC | Age: 59
End: 2019-04-15
Payer: COMMERCIAL

## 2019-04-18 ENCOUNTER — APPOINTMENT (OUTPATIENT)
Dept: OCCUPATIONAL THERAPY | Facility: CLINIC | Age: 59
End: 2019-04-18
Payer: COMMERCIAL

## 2019-04-18 ENCOUNTER — APPOINTMENT (OUTPATIENT)
Dept: PHYSICAL THERAPY | Facility: CLINIC | Age: 59
End: 2019-04-18
Payer: COMMERCIAL

## 2019-04-23 ENCOUNTER — OFFICE VISIT (OUTPATIENT)
Dept: PHYSICAL THERAPY | Facility: CLINIC | Age: 59
End: 2019-04-23
Payer: COMMERCIAL

## 2019-04-23 ENCOUNTER — APPOINTMENT (OUTPATIENT)
Dept: OCCUPATIONAL THERAPY | Facility: CLINIC | Age: 59
End: 2019-04-23
Payer: COMMERCIAL

## 2019-04-23 DIAGNOSIS — I63.9 CEREBROVASCULAR ACCIDENT (CVA), UNSPECIFIED MECHANISM (HCC): Primary | ICD-10-CM

## 2019-04-23 PROCEDURE — 97140 MANUAL THERAPY 1/> REGIONS: CPT | Performed by: PHYSICAL THERAPIST

## 2019-04-23 PROCEDURE — 97112 NEUROMUSCULAR REEDUCATION: CPT | Performed by: PHYSICAL THERAPIST

## 2019-04-25 ENCOUNTER — OFFICE VISIT (OUTPATIENT)
Dept: OCCUPATIONAL THERAPY | Facility: CLINIC | Age: 59
End: 2019-04-25
Payer: COMMERCIAL

## 2019-04-25 ENCOUNTER — OFFICE VISIT (OUTPATIENT)
Dept: PHYSICAL THERAPY | Facility: CLINIC | Age: 59
End: 2019-04-25
Payer: COMMERCIAL

## 2019-04-25 DIAGNOSIS — I63.9 CEREBROVASCULAR ACCIDENT (CVA), UNSPECIFIED MECHANISM (HCC): Primary | ICD-10-CM

## 2019-04-25 DIAGNOSIS — M25.531 RIGHT WRIST PAIN: Primary | ICD-10-CM

## 2019-04-25 PROCEDURE — 97112 NEUROMUSCULAR REEDUCATION: CPT

## 2019-04-25 PROCEDURE — 97140 MANUAL THERAPY 1/> REGIONS: CPT

## 2019-04-25 PROCEDURE — 97530 THERAPEUTIC ACTIVITIES: CPT

## 2019-04-25 PROCEDURE — 97110 THERAPEUTIC EXERCISES: CPT

## 2019-05-01 ENCOUNTER — OFFICE VISIT (OUTPATIENT)
Dept: ENDOCRINOLOGY | Facility: CLINIC | Age: 59
End: 2019-05-01
Payer: COMMERCIAL

## 2019-05-01 VITALS
DIASTOLIC BLOOD PRESSURE: 72 MMHG | BODY MASS INDEX: 34.5 KG/M2 | WEIGHT: 241 LBS | HEIGHT: 70 IN | SYSTOLIC BLOOD PRESSURE: 110 MMHG

## 2019-05-01 DIAGNOSIS — I12.9 BENIGN HYPERTENSION WITH CKD (CHRONIC KIDNEY DISEASE) STAGE III (HCC): ICD-10-CM

## 2019-05-01 DIAGNOSIS — N18.30 STAGE 3 CHRONIC KIDNEY DISEASE (HCC): ICD-10-CM

## 2019-05-01 DIAGNOSIS — N18.30 BENIGN HYPERTENSION WITH CKD (CHRONIC KIDNEY DISEASE) STAGE III (HCC): ICD-10-CM

## 2019-05-01 DIAGNOSIS — E11.649 TYPE 2 DIABETES MELLITUS WITH HYPOGLYCEMIA WITHOUT COMA, WITH LONG-TERM CURRENT USE OF INSULIN (HCC): Primary | ICD-10-CM

## 2019-05-01 DIAGNOSIS — Z79.4 TYPE 2 DIABETES MELLITUS WITH HYPOGLYCEMIA WITHOUT COMA, WITH LONG-TERM CURRENT USE OF INSULIN (HCC): Primary | ICD-10-CM

## 2019-05-01 DIAGNOSIS — E78.2 MIXED HYPERLIPIDEMIA: ICD-10-CM

## 2019-05-01 PROCEDURE — 3066F NEPHROPATHY DOC TX: CPT | Performed by: PHYSICIAN ASSISTANT

## 2019-05-01 PROCEDURE — 99214 OFFICE O/P EST MOD 30 MIN: CPT | Performed by: PHYSICIAN ASSISTANT

## 2019-05-01 RX ORDER — INSULIN GLARGINE 100 [IU]/ML
34 INJECTION, SOLUTION SUBCUTANEOUS DAILY
Qty: 10 ML | Refills: 3 | Status: ON HOLD
Start: 2019-05-01 | End: 2019-07-10 | Stop reason: SDUPTHER

## 2019-05-06 ENCOUNTER — OFFICE VISIT (OUTPATIENT)
Dept: PHYSICAL THERAPY | Facility: CLINIC | Age: 59
End: 2019-05-06
Payer: COMMERCIAL

## 2019-05-06 ENCOUNTER — OFFICE VISIT (OUTPATIENT)
Dept: OCCUPATIONAL THERAPY | Facility: CLINIC | Age: 59
End: 2019-05-06
Payer: COMMERCIAL

## 2019-05-06 DIAGNOSIS — M25.531 RIGHT WRIST PAIN: Primary | ICD-10-CM

## 2019-05-06 DIAGNOSIS — Z86.73 HISTORY OF STROKE: ICD-10-CM

## 2019-05-06 DIAGNOSIS — I63.9 CEREBROVASCULAR ACCIDENT (CVA), UNSPECIFIED MECHANISM (HCC): Primary | ICD-10-CM

## 2019-05-06 PROCEDURE — G8985 CARRY GOAL STATUS: HCPCS

## 2019-05-06 PROCEDURE — G8986 CARRY D/C STATUS: HCPCS

## 2019-05-07 ENCOUNTER — TELEPHONE (OUTPATIENT)
Dept: NEPHROLOGY | Facility: CLINIC | Age: 59
End: 2019-05-07

## 2019-05-08 ENCOUNTER — APPOINTMENT (OUTPATIENT)
Dept: OCCUPATIONAL THERAPY | Facility: CLINIC | Age: 59
End: 2019-05-08
Payer: COMMERCIAL

## 2019-05-08 ENCOUNTER — APPOINTMENT (OUTPATIENT)
Dept: PHYSICAL THERAPY | Facility: CLINIC | Age: 59
End: 2019-05-08
Payer: COMMERCIAL

## 2019-05-13 ENCOUNTER — APPOINTMENT (OUTPATIENT)
Dept: PHYSICAL THERAPY | Facility: CLINIC | Age: 59
End: 2019-05-13
Payer: COMMERCIAL

## 2019-05-13 ENCOUNTER — APPOINTMENT (OUTPATIENT)
Dept: OCCUPATIONAL THERAPY | Facility: CLINIC | Age: 59
End: 2019-05-13
Payer: COMMERCIAL

## 2019-05-13 DIAGNOSIS — F41.9 ANXIETY AND DEPRESSION: ICD-10-CM

## 2019-05-13 DIAGNOSIS — F32.A ANXIETY AND DEPRESSION: ICD-10-CM

## 2019-05-13 RX ORDER — ESCITALOPRAM OXALATE 10 MG/1
10 TABLET ORAL
Qty: 30 TABLET | Refills: 1 | Status: SHIPPED | OUTPATIENT
Start: 2019-05-13 | End: 2019-06-24 | Stop reason: SDUPTHER

## 2019-05-15 ENCOUNTER — OFFICE VISIT (OUTPATIENT)
Dept: PHYSICAL THERAPY | Facility: CLINIC | Age: 59
End: 2019-05-15
Payer: COMMERCIAL

## 2019-05-15 ENCOUNTER — APPOINTMENT (OUTPATIENT)
Dept: OCCUPATIONAL THERAPY | Facility: CLINIC | Age: 59
End: 2019-05-15
Payer: COMMERCIAL

## 2019-05-15 DIAGNOSIS — I63.9 CEREBROVASCULAR ACCIDENT (CVA), UNSPECIFIED MECHANISM (HCC): Primary | ICD-10-CM

## 2019-05-20 ENCOUNTER — OFFICE VISIT (OUTPATIENT)
Dept: PHYSICAL THERAPY | Facility: CLINIC | Age: 59
End: 2019-05-20
Payer: COMMERCIAL

## 2019-05-20 ENCOUNTER — APPOINTMENT (OUTPATIENT)
Dept: OCCUPATIONAL THERAPY | Facility: CLINIC | Age: 59
End: 2019-05-20
Payer: COMMERCIAL

## 2019-05-20 DIAGNOSIS — I63.9 CEREBROVASCULAR ACCIDENT (CVA), UNSPECIFIED MECHANISM (HCC): Primary | ICD-10-CM

## 2019-05-22 ENCOUNTER — OFFICE VISIT (OUTPATIENT)
Dept: PHYSICAL THERAPY | Facility: CLINIC | Age: 59
End: 2019-05-22
Payer: COMMERCIAL

## 2019-05-22 ENCOUNTER — APPOINTMENT (OUTPATIENT)
Dept: OCCUPATIONAL THERAPY | Facility: CLINIC | Age: 59
End: 2019-05-22
Payer: COMMERCIAL

## 2019-05-22 DIAGNOSIS — I63.9 CEREBROVASCULAR ACCIDENT (CVA), UNSPECIFIED MECHANISM (HCC): Primary | ICD-10-CM

## 2019-05-23 ENCOUNTER — TELEPHONE (OUTPATIENT)
Dept: INTERNAL MEDICINE CLINIC | Facility: CLINIC | Age: 59
End: 2019-05-23

## 2019-05-23 DIAGNOSIS — I63.312 CEREBROVASCULAR ACCIDENT (CVA) DUE TO THROMBOSIS OF LEFT MIDDLE CEREBRAL ARTERY (HCC): ICD-10-CM

## 2019-05-23 DIAGNOSIS — N39.498 OTHER URINARY INCONTINENCE: Primary | ICD-10-CM

## 2019-05-23 DIAGNOSIS — E11.65 TYPE 2 DIABETES MELLITUS WITH HYPERGLYCEMIA, WITH LONG-TERM CURRENT USE OF INSULIN (HCC): ICD-10-CM

## 2019-05-23 DIAGNOSIS — Z79.4 TYPE 2 DIABETES MELLITUS WITH HYPERGLYCEMIA, WITH LONG-TERM CURRENT USE OF INSULIN (HCC): ICD-10-CM

## 2019-05-28 ENCOUNTER — APPOINTMENT (OUTPATIENT)
Dept: OCCUPATIONAL THERAPY | Facility: CLINIC | Age: 59
End: 2019-05-28
Payer: COMMERCIAL

## 2019-05-28 ENCOUNTER — APPOINTMENT (OUTPATIENT)
Dept: PHYSICAL THERAPY | Facility: CLINIC | Age: 59
End: 2019-05-28
Payer: COMMERCIAL

## 2019-05-29 ENCOUNTER — APPOINTMENT (OUTPATIENT)
Dept: PHYSICAL THERAPY | Facility: CLINIC | Age: 59
End: 2019-05-29
Payer: COMMERCIAL

## 2019-05-29 ENCOUNTER — APPOINTMENT (OUTPATIENT)
Dept: OCCUPATIONAL THERAPY | Facility: CLINIC | Age: 59
End: 2019-05-29
Payer: COMMERCIAL

## 2019-06-10 DIAGNOSIS — N18.30 STAGE 3 CHRONIC KIDNEY DISEASE (HCC): ICD-10-CM

## 2019-06-10 DIAGNOSIS — I69.30 HISTORY OF ISCHEMIC CEREBROVASCULAR ACCIDENT (CVA) WITH RESIDUAL DEFICIT: ICD-10-CM

## 2019-06-10 DIAGNOSIS — I12.9 BENIGN HYPERTENSION WITH CKD (CHRONIC KIDNEY DISEASE) STAGE III (HCC): ICD-10-CM

## 2019-06-10 DIAGNOSIS — E78.2 MIXED HYPERLIPIDEMIA: ICD-10-CM

## 2019-06-10 DIAGNOSIS — I10 ESSENTIAL HYPERTENSION: Chronic | ICD-10-CM

## 2019-06-10 DIAGNOSIS — N18.30 BENIGN HYPERTENSION WITH CKD (CHRONIC KIDNEY DISEASE) STAGE III (HCC): ICD-10-CM

## 2019-06-10 DIAGNOSIS — I63.9 ISCHEMIC CEREBROVASCULAR ACCIDENT (CVA) (HCC): ICD-10-CM

## 2019-06-10 RX ORDER — ATORVASTATIN CALCIUM 80 MG/1
80 TABLET, FILM COATED ORAL
Qty: 90 TABLET | Refills: 1 | Status: SHIPPED | OUTPATIENT
Start: 2019-06-10 | End: 2020-01-16 | Stop reason: SDUPTHER

## 2019-06-10 RX ORDER — AMLODIPINE BESYLATE 5 MG/1
5 TABLET ORAL EVERY 12 HOURS
Qty: 180 TABLET | Refills: 1 | Status: SHIPPED | OUTPATIENT
Start: 2019-06-10 | End: 2020-01-16 | Stop reason: SDUPTHER

## 2019-06-11 ENCOUNTER — CONSULT (OUTPATIENT)
Dept: UROLOGY | Facility: CLINIC | Age: 59
End: 2019-06-11
Payer: COMMERCIAL

## 2019-06-11 VITALS
HEART RATE: 84 BPM | DIASTOLIC BLOOD PRESSURE: 72 MMHG | BODY MASS INDEX: 33.82 KG/M2 | SYSTOLIC BLOOD PRESSURE: 142 MMHG | HEIGHT: 70 IN | WEIGHT: 236.2 LBS

## 2019-06-11 DIAGNOSIS — Z79.4 TYPE 2 DIABETES MELLITUS WITH HYPERGLYCEMIA, WITH LONG-TERM CURRENT USE OF INSULIN (HCC): ICD-10-CM

## 2019-06-11 DIAGNOSIS — I63.312 CEREBROVASCULAR ACCIDENT (CVA) DUE TO THROMBOSIS OF LEFT MIDDLE CEREBRAL ARTERY (HCC): ICD-10-CM

## 2019-06-11 DIAGNOSIS — N39.498 OTHER URINARY INCONTINENCE: Primary | ICD-10-CM

## 2019-06-11 DIAGNOSIS — E11.65 TYPE 2 DIABETES MELLITUS WITH HYPERGLYCEMIA, WITH LONG-TERM CURRENT USE OF INSULIN (HCC): ICD-10-CM

## 2019-06-11 LAB — POST-VOID RESIDUAL VOLUME, ML POC: 57 ML

## 2019-06-11 PROCEDURE — 99243 OFF/OP CNSLTJ NEW/EST LOW 30: CPT | Performed by: PHYSICIAN ASSISTANT

## 2019-06-11 PROCEDURE — 51798 US URINE CAPACITY MEASURE: CPT | Performed by: PHYSICIAN ASSISTANT

## 2019-06-17 ENCOUNTER — TELEPHONE (OUTPATIENT)
Dept: UROLOGY | Facility: MEDICAL CENTER | Age: 59
End: 2019-06-17

## 2019-06-17 ENCOUNTER — OFFICE VISIT (OUTPATIENT)
Dept: INTERNAL MEDICINE CLINIC | Facility: CLINIC | Age: 59
End: 2019-06-17
Payer: COMMERCIAL

## 2019-06-17 VITALS
TEMPERATURE: 98.4 F | HEART RATE: 75 BPM | DIASTOLIC BLOOD PRESSURE: 76 MMHG | SYSTOLIC BLOOD PRESSURE: 124 MMHG | WEIGHT: 238 LBS | RESPIRATION RATE: 16 BRPM | OXYGEN SATURATION: 97 % | BODY MASS INDEX: 34.07 KG/M2 | HEIGHT: 70 IN

## 2019-06-17 DIAGNOSIS — E11.65 TYPE 2 DIABETES MELLITUS WITH HYPERGLYCEMIA, WITH LONG-TERM CURRENT USE OF INSULIN (HCC): ICD-10-CM

## 2019-06-17 DIAGNOSIS — F41.8 ANXIETY ASSOCIATED WITH DEPRESSION: ICD-10-CM

## 2019-06-17 DIAGNOSIS — N18.4 STAGE 4 CHRONIC KIDNEY DISEASE (HCC): ICD-10-CM

## 2019-06-17 DIAGNOSIS — N18.4 BENIGN HYPERTENSION WITH CKD (CHRONIC KIDNEY DISEASE) STAGE IV (HCC): ICD-10-CM

## 2019-06-17 DIAGNOSIS — Z79.4 TYPE 2 DIABETES MELLITUS WITH HYPERGLYCEMIA, WITH LONG-TERM CURRENT USE OF INSULIN (HCC): ICD-10-CM

## 2019-06-17 DIAGNOSIS — I12.9 BENIGN HYPERTENSION WITH CKD (CHRONIC KIDNEY DISEASE) STAGE IV (HCC): ICD-10-CM

## 2019-06-17 DIAGNOSIS — R19.7 DIARRHEA, UNSPECIFIED TYPE: Primary | ICD-10-CM

## 2019-06-17 PROCEDURE — 1036F TOBACCO NON-USER: CPT | Performed by: INTERNAL MEDICINE

## 2019-06-17 PROCEDURE — 3008F BODY MASS INDEX DOCD: CPT | Performed by: INTERNAL MEDICINE

## 2019-06-17 PROCEDURE — 99214 OFFICE O/P EST MOD 30 MIN: CPT | Performed by: INTERNAL MEDICINE

## 2019-06-18 DIAGNOSIS — N39.498 OTHER URINARY INCONTINENCE: Primary | ICD-10-CM

## 2019-06-18 RX ORDER — TROSPIUM CHLORIDE 20 MG/1
20 TABLET, FILM COATED ORAL 2 TIMES DAILY
Qty: 60 TABLET | Refills: 5 | Status: SHIPPED | OUTPATIENT
Start: 2019-06-18 | End: 2019-06-18 | Stop reason: SDUPTHER

## 2019-06-18 RX ORDER — TROSPIUM CHLORIDE 20 MG/1
20 TABLET, FILM COATED ORAL 2 TIMES DAILY
Qty: 60 TABLET | Refills: 5 | Status: SHIPPED | OUTPATIENT
Start: 2019-06-18 | End: 2019-07-09

## 2019-06-24 ENCOUNTER — APPOINTMENT (OUTPATIENT)
Dept: LAB | Facility: CLINIC | Age: 59
End: 2019-06-24
Payer: COMMERCIAL

## 2019-06-24 DIAGNOSIS — R19.7 DIARRHEA, UNSPECIFIED TYPE: ICD-10-CM

## 2019-06-24 DIAGNOSIS — F41.9 ANXIETY AND DEPRESSION: ICD-10-CM

## 2019-06-24 DIAGNOSIS — F32.A ANXIETY AND DEPRESSION: ICD-10-CM

## 2019-06-24 LAB — C DIFF TOX GENS STL QL NAA+PROBE: NORMAL

## 2019-06-24 PROCEDURE — 87209 SMEAR COMPLEX STAIN: CPT

## 2019-06-24 PROCEDURE — 87177 OVA AND PARASITES SMEARS: CPT

## 2019-06-24 PROCEDURE — 87493 C DIFF AMPLIFIED PROBE: CPT

## 2019-06-24 PROCEDURE — 87329 GIARDIA AG IA: CPT

## 2019-06-24 RX ORDER — ESCITALOPRAM OXALATE 10 MG/1
10 TABLET ORAL
Qty: 30 TABLET | Refills: 2 | Status: SHIPPED | OUTPATIENT
Start: 2019-06-24 | End: 2019-08-13 | Stop reason: SDUPTHER

## 2019-06-26 LAB — G LAMBLIA AG STL QL IA: NEGATIVE

## 2019-06-27 LAB — O+P STL CONC: NORMAL

## 2019-07-08 LAB
25(OH)D3 SERPL-MCNC: 28 NG/ML (ref 30–100)
BASOPHILS # BLD AUTO: 31 CELLS/UL (ref 0–200)
BASOPHILS NFR BLD AUTO: 0.4 %
BUN SERPL-MCNC: 43 MG/DL (ref 7–25)
BUN/CREAT SERPL: 14 (CALC) (ref 6–22)
CALCIUM SERPL-MCNC: 8.9 MG/DL (ref 8.6–10.3)
CALCIUM SERPL-MCNC: 8.9 MG/DL (ref 8.6–10.3)
CHLORIDE SERPL-SCNC: 110 MMOL/L (ref 98–110)
CO2 SERPL-SCNC: 21 MMOL/L (ref 20–32)
CREAT SERPL-MCNC: 3.05 MG/DL (ref 0.7–1.33)
CREAT UR-MCNC: 96 MG/DL (ref 20–320)
EOSINOPHIL # BLD AUTO: 177 CELLS/UL (ref 15–500)
EOSINOPHIL NFR BLD AUTO: 2.3 %
ERYTHROCYTE [DISTWIDTH] IN BLOOD BY AUTOMATED COUNT: 13.3 % (ref 11–15)
GLUCOSE SERPL-MCNC: 149 MG/DL (ref 65–99)
HCT VFR BLD AUTO: 39.2 % (ref 38.5–50)
HGB BLD-MCNC: 12.9 G/DL (ref 13.2–17.1)
LYMPHOCYTES # BLD AUTO: 2064 CELLS/UL (ref 850–3900)
LYMPHOCYTES NFR BLD AUTO: 26.8 %
MAGNESIUM SERPL-MCNC: 2 MG/DL (ref 1.5–2.5)
MCH RBC QN AUTO: 28.1 PG (ref 27–33)
MCHC RBC AUTO-ENTMCNC: 32.9 G/DL (ref 32–36)
MCV RBC AUTO: 85.4 FL (ref 80–100)
MONOCYTES # BLD AUTO: 647 CELLS/UL (ref 200–950)
MONOCYTES NFR BLD AUTO: 8.4 %
NEUTROPHILS # BLD AUTO: 4782 CELLS/UL (ref 1500–7800)
NEUTROPHILS NFR BLD AUTO: 62.1 %
PHOSPHATE SERPL-MCNC: 3.4 MG/DL (ref 2.5–4.5)
PLATELET # BLD AUTO: 198 THOUSAND/UL (ref 140–400)
PMV BLD REES-ECKER: 10.9 FL (ref 7.5–12.5)
POTASSIUM SERPL-SCNC: 4.5 MMOL/L (ref 3.5–5.3)
PROT UR-MCNC: 724 MG/DL (ref 5–25)
PROT/CREAT UR: 7542 MG/G CREAT (ref 22–128)
PTH-INTACT SERPL-MCNC: 165 PG/ML (ref 14–64)
RBC # BLD AUTO: 4.59 MILLION/UL (ref 4.2–5.8)
SL AMB EGFR AFRICAN AMERICAN: 25 ML/MIN/1.73M2
SL AMB EGFR NON AFRICAN AMERICAN: 21 ML/MIN/1.73M2
SODIUM SERPL-SCNC: 139 MMOL/L (ref 135–146)
WBC # BLD AUTO: 7.7 THOUSAND/UL (ref 3.8–10.8)

## 2019-07-09 ENCOUNTER — HOSPITAL ENCOUNTER (OUTPATIENT)
Facility: HOSPITAL | Age: 59
Setting detail: OBSERVATION
Discharge: HOME | End: 2019-07-10
Attending: EMERGENCY MEDICINE | Admitting: INTERNAL MEDICINE
Payer: COMMERCIAL

## 2019-07-09 ENCOUNTER — APPOINTMENT (EMERGENCY)
Dept: CT IMAGING | Facility: HOSPITAL | Age: 59
End: 2019-07-09
Payer: COMMERCIAL

## 2019-07-09 ENCOUNTER — OFFICE VISIT (OUTPATIENT)
Dept: NEPHROLOGY | Facility: CLINIC | Age: 59
End: 2019-07-09
Payer: COMMERCIAL

## 2019-07-09 VITALS — RESPIRATION RATE: 16 BRPM | BODY MASS INDEX: 35.16 KG/M2 | HEIGHT: 69 IN | HEART RATE: 52 BPM | WEIGHT: 237.4 LBS

## 2019-07-09 DIAGNOSIS — N18.9 CKD (CHRONIC KIDNEY DISEASE): ICD-10-CM

## 2019-07-09 DIAGNOSIS — R80.1 PERSISTENT PROTEINURIA: ICD-10-CM

## 2019-07-09 DIAGNOSIS — E11.42 DIABETIC POLYNEUROPATHY ASSOCIATED WITH TYPE 2 DIABETES MELLITUS (HCC): ICD-10-CM

## 2019-07-09 DIAGNOSIS — R29.818 TRANSIENT NEUROLOGIC DEFICIT: ICD-10-CM

## 2019-07-09 DIAGNOSIS — Z79.4 TYPE 2 DIABETES MELLITUS WITH HYPOGLYCEMIA WITHOUT COMA, WITH LONG-TERM CURRENT USE OF INSULIN (HCC): ICD-10-CM

## 2019-07-09 DIAGNOSIS — T68.XXXA HYPOTHERMIA: ICD-10-CM

## 2019-07-09 DIAGNOSIS — E11.65 TYPE 2 DIABETES MELLITUS WITH HYPERGLYCEMIA, WITH LONG-TERM CURRENT USE OF INSULIN (HCC): ICD-10-CM

## 2019-07-09 DIAGNOSIS — N18.4 STAGE 4 CHRONIC KIDNEY DISEASE (HCC): Primary | ICD-10-CM

## 2019-07-09 DIAGNOSIS — Z79.4 TYPE 2 DIABETES MELLITUS WITH HYPERGLYCEMIA, WITH LONG-TERM CURRENT USE OF INSULIN (HCC): ICD-10-CM

## 2019-07-09 DIAGNOSIS — E11.649 TYPE 2 DIABETES MELLITUS WITH HYPOGLYCEMIA WITHOUT COMA, WITH LONG-TERM CURRENT USE OF INSULIN (HCC): ICD-10-CM

## 2019-07-09 DIAGNOSIS — E16.2 HYPOGLYCEMIA: Primary | ICD-10-CM

## 2019-07-09 DIAGNOSIS — K74.60 HEPATIC CIRRHOSIS, UNSPECIFIED HEPATIC CIRRHOSIS TYPE, UNSPECIFIED WHETHER ASCITES PRESENT (HCC): ICD-10-CM

## 2019-07-09 PROBLEM — I10 HYPERTENSION: Status: ACTIVE | Noted: 2019-07-09

## 2019-07-09 LAB
ALBUMIN SERPL BCP-MCNC: 3.2 G/DL (ref 3.5–5)
ALP SERPL-CCNC: 157 U/L (ref 46–116)
ALT SERPL W P-5'-P-CCNC: 22 U/L (ref 12–78)
ANION GAP SERPL CALCULATED.3IONS-SCNC: 12 MMOL/L (ref 4–13)
APTT PPP: 34 SECONDS (ref 23–37)
AST SERPL W P-5'-P-CCNC: 15 U/L (ref 5–45)
ATRIAL RATE: 65 BPM
BACTERIA UR QL AUTO: ABNORMAL /HPF
BASOPHILS # BLD AUTO: 0.03 THOUSANDS/ΜL (ref 0–0.1)
BASOPHILS NFR BLD AUTO: 0 % (ref 0–1)
BILIRUB SERPL-MCNC: 0.4 MG/DL (ref 0.2–1)
BILIRUB UR QL STRIP: NEGATIVE
BUN SERPL-MCNC: 40 MG/DL (ref 5–25)
CALCIUM SERPL-MCNC: 9 MG/DL (ref 8.3–10.1)
CHLORIDE SERPL-SCNC: 108 MMOL/L (ref 100–108)
CLARITY UR: CLEAR
CO2 SERPL-SCNC: 20 MMOL/L (ref 21–32)
COARSE GRAN CASTS URNS QL MICRO: ABNORMAL /LPF
COLOR UR: YELLOW
CREAT SERPL-MCNC: 3.04 MG/DL (ref 0.6–1.3)
EOSINOPHIL # BLD AUTO: 0.17 THOUSAND/ΜL (ref 0–0.61)
EOSINOPHIL NFR BLD AUTO: 2 % (ref 0–6)
ERYTHROCYTE [DISTWIDTH] IN BLOOD BY AUTOMATED COUNT: 13.5 % (ref 11.6–15.1)
FINE GRAN CASTS URNS QL MICRO: ABNORMAL /LPF
GFR SERPL CREATININE-BSD FRML MDRD: 21 ML/MIN/1.73SQ M
GLUCOSE SERPL-MCNC: 119 MG/DL (ref 65–140)
GLUCOSE SERPL-MCNC: 121 MG/DL (ref 65–140)
GLUCOSE SERPL-MCNC: 140 MG/DL (ref 65–140)
GLUCOSE SERPL-MCNC: 150 MG/DL (ref 65–140)
GLUCOSE SERPL-MCNC: 151 MG/DL (ref 65–140)
GLUCOSE SERPL-MCNC: 161 MG/DL (ref 65–140)
GLUCOSE SERPL-MCNC: 98 MG/DL (ref 65–140)
GLUCOSE UR STRIP-MCNC: ABNORMAL MG/DL
HCT VFR BLD AUTO: 38.8 % (ref 36.5–49.3)
HGB BLD-MCNC: 12.6 G/DL (ref 12–17)
HGB UR QL STRIP.AUTO: ABNORMAL
HYALINE CASTS #/AREA URNS LPF: ABNORMAL /LPF
IMM GRANULOCYTES # BLD AUTO: 0.04 THOUSAND/UL (ref 0–0.2)
IMM GRANULOCYTES NFR BLD AUTO: 1 % (ref 0–2)
INR PPP: 1.01 (ref 0.84–1.19)
KETONES UR STRIP-MCNC: NEGATIVE MG/DL
LEUKOCYTE ESTERASE UR QL STRIP: NEGATIVE
LYMPHOCYTES # BLD AUTO: 1.44 THOUSANDS/ΜL (ref 0.6–4.47)
LYMPHOCYTES NFR BLD AUTO: 19 % (ref 14–44)
MCH RBC QN AUTO: 28.5 PG (ref 26.8–34.3)
MCHC RBC AUTO-ENTMCNC: 32.5 G/DL (ref 31.4–37.4)
MCV RBC AUTO: 88 FL (ref 82–98)
MONOCYTES # BLD AUTO: 0.75 THOUSAND/ΜL (ref 0.17–1.22)
MONOCYTES NFR BLD AUTO: 10 % (ref 4–12)
NEUTROPHILS # BLD AUTO: 4.98 THOUSANDS/ΜL (ref 1.85–7.62)
NEUTS SEG NFR BLD AUTO: 68 % (ref 43–75)
NITRITE UR QL STRIP: NEGATIVE
NON-SQ EPI CELLS URNS QL MICRO: ABNORMAL /HPF
NRBC BLD AUTO-RTO: 0 /100 WBCS
P AXIS: 67 DEGREES
PH UR STRIP.AUTO: 5 [PH] (ref 4.5–8)
PLATELET # BLD AUTO: 170 THOUSANDS/UL (ref 149–390)
PMV BLD AUTO: 10.9 FL (ref 8.9–12.7)
POTASSIUM SERPL-SCNC: 4 MMOL/L (ref 3.5–5.3)
PR INTERVAL: 192 MS
PROT SERPL-MCNC: 7.4 G/DL (ref 6.4–8.2)
PROT UR STRIP-MCNC: >=300 MG/DL
PROTHROMBIN TIME: 12.7 SECONDS (ref 11.6–14.5)
QRS AXIS: 5 DEGREES
QRSD INTERVAL: 98 MS
QT INTERVAL: 410 MS
QTC INTERVAL: 426 MS
RBC # BLD AUTO: 4.42 MILLION/UL (ref 3.88–5.62)
RBC #/AREA URNS AUTO: ABNORMAL /HPF
SODIUM SERPL-SCNC: 140 MMOL/L (ref 136–145)
SP GR UR STRIP.AUTO: 1.02 (ref 1–1.03)
T WAVE AXIS: 1 DEGREES
TROPONIN I SERPL-MCNC: <0.02 NG/ML
UROBILINOGEN UR QL STRIP.AUTO: 0.2 E.U./DL
VENTRICULAR RATE: 65 BPM
WBC # BLD AUTO: 7.41 THOUSAND/UL (ref 4.31–10.16)
WBC #/AREA URNS AUTO: ABNORMAL /HPF

## 2019-07-09 PROCEDURE — 70450 CT HEAD/BRAIN W/O DYE: CPT

## 2019-07-09 PROCEDURE — 3066F NEPHROPATHY DOC TX: CPT | Performed by: INTERNAL MEDICINE

## 2019-07-09 PROCEDURE — 96360 HYDRATION IV INFUSION INIT: CPT

## 2019-07-09 PROCEDURE — 82948 REAGENT STRIP/BLOOD GLUCOSE: CPT

## 2019-07-09 PROCEDURE — 80053 COMPREHEN METABOLIC PANEL: CPT | Performed by: EMERGENCY MEDICINE

## 2019-07-09 PROCEDURE — 99214 OFFICE O/P EST MOD 30 MIN: CPT | Performed by: INTERNAL MEDICINE

## 2019-07-09 PROCEDURE — 85610 PROTHROMBIN TIME: CPT | Performed by: EMERGENCY MEDICINE

## 2019-07-09 PROCEDURE — 85025 COMPLETE CBC W/AUTO DIFF WBC: CPT | Performed by: EMERGENCY MEDICINE

## 2019-07-09 PROCEDURE — 94760 N-INVAS EAR/PLS OXIMETRY 1: CPT

## 2019-07-09 PROCEDURE — 94664 DEMO&/EVAL PT USE INHALER: CPT

## 2019-07-09 PROCEDURE — 99219 PR INITIAL OBSERVATION CARE/DAY 50 MINUTES: CPT | Performed by: INTERNAL MEDICINE

## 2019-07-09 PROCEDURE — 81001 URINALYSIS AUTO W/SCOPE: CPT

## 2019-07-09 PROCEDURE — 85730 THROMBOPLASTIN TIME PARTIAL: CPT | Performed by: EMERGENCY MEDICINE

## 2019-07-09 PROCEDURE — 36415 COLL VENOUS BLD VENIPUNCTURE: CPT | Performed by: EMERGENCY MEDICINE

## 2019-07-09 PROCEDURE — 93005 ELECTROCARDIOGRAM TRACING: CPT

## 2019-07-09 PROCEDURE — 93010 ELECTROCARDIOGRAM REPORT: CPT | Performed by: INTERNAL MEDICINE

## 2019-07-09 PROCEDURE — 1111F DSCHRG MED/CURRENT MED MERGE: CPT | Performed by: INTERNAL MEDICINE

## 2019-07-09 PROCEDURE — 99285 EMERGENCY DEPT VISIT HI MDM: CPT

## 2019-07-09 PROCEDURE — 84484 ASSAY OF TROPONIN QUANT: CPT | Performed by: EMERGENCY MEDICINE

## 2019-07-09 PROCEDURE — 99284 EMERGENCY DEPT VISIT MOD MDM: CPT | Performed by: EMERGENCY MEDICINE

## 2019-07-09 RX ORDER — ESCITALOPRAM OXALATE 10 MG/1
10 TABLET ORAL
Status: DISCONTINUED | OUTPATIENT
Start: 2019-07-09 | End: 2019-07-10 | Stop reason: HOSPADM

## 2019-07-09 RX ORDER — ATORVASTATIN CALCIUM 40 MG/1
80 TABLET, FILM COATED ORAL
Status: DISCONTINUED | OUTPATIENT
Start: 2019-07-09 | End: 2019-07-10 | Stop reason: HOSPADM

## 2019-07-09 RX ORDER — ONDANSETRON 2 MG/ML
4 INJECTION INTRAMUSCULAR; INTRAVENOUS EVERY 4 HOURS PRN
Status: DISCONTINUED | OUTPATIENT
Start: 2019-07-09 | End: 2019-07-10 | Stop reason: HOSPADM

## 2019-07-09 RX ORDER — CHOLECALCIFEROL (VITAMIN D3) 125 MCG
1000 CAPSULE ORAL DAILY
Status: DISCONTINUED | OUTPATIENT
Start: 2019-07-09 | End: 2019-07-10 | Stop reason: HOSPADM

## 2019-07-09 RX ORDER — FAMOTIDINE 20 MG/1
20 TABLET, FILM COATED ORAL DAILY
Status: DISCONTINUED | OUTPATIENT
Start: 2019-07-09 | End: 2019-07-10 | Stop reason: HOSPADM

## 2019-07-09 RX ORDER — AMLODIPINE BESYLATE 5 MG/1
5 TABLET ORAL EVERY 12 HOURS
Status: DISCONTINUED | OUTPATIENT
Start: 2019-07-09 | End: 2019-07-10 | Stop reason: HOSPADM

## 2019-07-09 RX ORDER — ASPIRIN 81 MG/1
81 TABLET ORAL DAILY
Status: DISCONTINUED | OUTPATIENT
Start: 2019-07-09 | End: 2019-07-10 | Stop reason: HOSPADM

## 2019-07-09 RX ORDER — LACTULOSE 20 G/30ML
20 SOLUTION ORAL DAILY PRN
Status: DISCONTINUED | OUTPATIENT
Start: 2019-07-09 | End: 2019-07-10 | Stop reason: HOSPADM

## 2019-07-09 RX ORDER — GABAPENTIN 250 MG/5ML
600 SOLUTION ORAL
Status: DISCONTINUED | OUTPATIENT
Start: 2019-07-09 | End: 2019-07-10 | Stop reason: HOSPADM

## 2019-07-09 RX ORDER — CARVEDILOL 6.25 MG/1
6.25 TABLET ORAL 2 TIMES DAILY WITH MEALS
Status: DISCONTINUED | OUTPATIENT
Start: 2019-07-09 | End: 2019-07-10 | Stop reason: HOSPADM

## 2019-07-09 RX ADMIN — GABAPENTIN 600 MG: 250 SOLUTION ORAL at 21:17

## 2019-07-09 RX ADMIN — ASPIRIN 81 MG: 81 TABLET, COATED ORAL at 16:43

## 2019-07-09 RX ADMIN — CYANOCOBALAMIN TAB 500 MCG 1000 MCG: 500 TAB at 16:43

## 2019-07-09 RX ADMIN — SODIUM CHLORIDE 500 ML: 0.9 INJECTION, SOLUTION INTRAVENOUS at 11:30

## 2019-07-09 RX ADMIN — CARVEDILOL 6.25 MG: 6.25 TABLET, FILM COATED ORAL at 16:43

## 2019-07-09 RX ADMIN — ATORVASTATIN CALCIUM 80 MG: 40 TABLET, FILM COATED ORAL at 16:43

## 2019-07-09 RX ADMIN — AMLODIPINE BESYLATE 5 MG: 5 TABLET ORAL at 16:43

## 2019-07-09 RX ADMIN — INSULIN LISPRO 1 UNITS: 100 INJECTION, SOLUTION INTRAVENOUS; SUBCUTANEOUS at 16:47

## 2019-07-09 RX ADMIN — ESCITALOPRAM OXALATE 10 MG: 10 TABLET ORAL at 21:17

## 2019-07-09 RX ADMIN — FAMOTIDINE 20 MG: 20 TABLET ORAL at 16:43

## 2019-07-09 NOTE — PROGRESS NOTES
NEPHROLOGY OFFICE VISIT   Marisabel Gould 61 y o  male MRN: [id number]  7/9/2019    Reason for Visit:  Chronic kidney disease    ASSESSMENT and PLAN:    I had the pleasure of seeing Sabrina Mejia today in the renal clinic for the continued management of chronic kidney disease  Since our last visit, there has been no ER visits or hospitilizations  He currently has no complaints at this time and is feeling well  Patient denies any chest pain, shortness of breath and swelling  The last blood work was done on July 2019, which we have reviewed together  His creatinine is now 3 05 which is at the higher end of his baseline appears to show some progression of his underlying chronic kidney disease  I did discuss different dialysis modalities with him as well as renal transplant options  There is no urgent indication for renal replacement therapy at this time  During the encounter he started to become diaphoretic and not feel well  He did take his Lantus 35 units and insulin this morning but did not eat breakfast   His blood sugar in the morning was 165  His blood pressure was high during the encounter  He started to not feel well and remained diaphoretic  I was concerned that around 9:50 a m  I noticed some left-sided weakness, an asymmetrical smile  He does have a history of TIA and stroke in the past   I subsequently called EMS spoke to the emergency room at [facility]   I recommended he go to the emergency room to be checked out  He received 4 g of a glucose tablet as well as an apple which he did not eat and 1 bite of a cup cake  We gave him water and he did not feel much better  EMS arrived and currently giving him D5 water  His blood sugar was found to be 57 in the office      1 ) Chronic kidney disease stage IV with proteinuria  -presumed etiology is diabetic nephropathy as well as a component of hypertensive nephrosclerosis  -he has attended [facility] class  -Serologies negative including SOLEDAD, ANCA, C3, C4   -he has had some progression of his underlying chronic kidney disease   -baseline creatinine appears to fluctuate between 2 5-3 mg/dL  -most recent creatinine is 3 05 which is evidence of some progression  -no urgent indication for dialysis  -I did discuss different modalities of dialysis including nocturnal dialysis, incenter hemodialysis and peritoneal dialysis  -discussed transplant evaluation    2 ) Hypoglycemia  -took his morning Lantus and insulin did not eat this morning  -blood sugar has improved to 128 after being as low as 57 after receiving D5 water    3 ) Hypertension  -blood pressure had been at goal at his primary care physician at the last visit few weeks ago, blood pressure is high here    4 ) Diabetes mellitus type 2  -with nephropathy, neuropathy and retinopathy    5 ) History of CVA  -August 0478  -complicated by a small intracranial hemorrhage  -he is having some left-sided weakness an asymmetric smile at this time noticed at 9:50 a m , could be potentially symptoms related to his hyperglycemia but should be evaluated for new onset CVA      PATIENT INSTRUCTIONS:    Patient Instructions   1 )  Low 2 g sodium diet    2 )  Monitor weights at home    3 )  Avoid NSAIDs    4 )  Monitor blood pressure at home, call if blood pressure greater than 150/90 persistently    5 ) Set up a tour in PD clinic    6 ) Transplant evaluation          Orders Placed This Encounter   Procedures    Comprehensive metabolic panel     Standing Status:   Future     Standing Expiration Date:   7/9/2020    CBC     Standing Status:   Future     Standing Expiration Date:   7/9/2020    Protein / creatinine ratio, urine     Standing Status:   Future     Standing Expiration Date:   7/9/2020    Ambulatory referral to renal transplant evaluation     Standing Status:   Future     Standing Expiration Date:   1/9/2020     Referral Priority:   Routine     Referral Type:   Consult - AMB     Referral Reason:   Specialty Services Required     Requested Specialty:   Nephrology     Number of Visits Requested:   1     Expiration Date:   7/9/2020       OBJECTIVE:  Current Weight: Weight - Scale: 108 kg (237 lb 6 4 oz)  Vitals:    07/09/19 0932   Pulse: (!) 52   Resp: 16   Weight: 108 kg (237 lb 6 4 oz)   Height: 5' 8 5" (1 74 m)    Body mass index is 35 57 kg/m²  REVIEW OF SYSTEMS:    Review of Systems   Constitutional: Negative for activity change and fever  Does not feel well, diaphoretic   Respiratory: Negative for cough, chest tightness, shortness of breath and wheezing  Cardiovascular: Negative for chest pain and leg swelling  Gastrointestinal: Negative for abdominal pain, diarrhea, nausea and vomiting  Endocrine: Negative for polyuria  Genitourinary: Negative for difficulty urinating, dysuria, flank pain, frequency and urgency  Skin: Negative for rash  Neurological: Negative for dizziness, syncope, light-headedness and headaches  Left-sided weakness       PHYSICAL EXAM:      Physical Exam   Constitutional: He appears well-developed and well-nourished  No distress  He is currently very diaphoretic, arousable not very responsive verbally   HENT:   Head: Normocephalic and atraumatic  Eyes: Pupils are equal, round, and reactive to light  No scleral icterus  Neck: Normal range of motion  Neck supple  Cardiovascular: Normal rate, regular rhythm and normal heart sounds  Exam reveals no gallop and no friction rub  No murmur heard  Pulmonary/Chest: Effort normal and breath sounds normal  No respiratory distress  He has no wheezes  He has no rales  He exhibits no tenderness  Abdominal: Soft  Bowel sounds are normal  He exhibits no distension  There is no tenderness  There is no rebound  Musculoskeletal: Normal range of motion  He exhibits no edema  Neurological: He is alert  Left-sided weakness and asymmetrical smile   Skin: No rash noted  He is not diaphoretic     Way and clammy Psychiatric: He has a normal mood and affect  Nursing note and vitals reviewed  Medications:    Current Outpatient Medications:     amLODIPine (NORVASC) 5 mg tablet, Take 1 tablet (5 mg total) by mouth every 12 (twelve) hours, Disp: 180 tablet, Rfl: 1    aspirin (ECOTRIN LOW STRENGTH) 81 mg EC tablet, Take 81 mg by mouth daily Resume on 8/14, Disp: , Rfl:     atorvastatin (LIPITOR) 80 mg tablet, Take 1 tablet (80 mg total) by mouth daily with dinner, Disp: 90 tablet, Rfl: 1    Blood Glucose Monitoring Suppl (ONE TOUCH ULTRA MINI) w/Device KIT, by Does not apply route 3 (three) times a day, Disp: 1 each, Rfl: 0    Blood Pressure Monitoring (BLOOD PRESSURE CUFF) MISC, Use to check blood pressure before taking blood pressure medication and 1 hour after and follow instructions provided in discharge instructions based on the readings  , Disp: 1 each, Rfl: 0    carvedilol (COREG) 6 25 mg tablet, Take 1 tablet (6 25 mg total) by mouth 2 (two) times a day with meals, Disp: 180 tablet, Rfl: 1    Cholecalciferol (VITAMIN D3) 69956 units CAPS, TAKE 1 TABLET BY MOUTH ONCE A WEEK, Disp: 5 capsule, Rfl: 5    CVS GLUCOSE 4-6 GM-MG, CHEW 3 TABLETS BY MOUTH DAILY AS NEEDED, Disp: , Rfl: 0    CVS VITAMIN B-12 1000 MCG tablet, TAKE 1 TABLET BY MOUTH EVERY DAY, Disp: 30 tablet, Rfl: 5    escitalopram (LEXAPRO) 10 mg tablet, Take 1 tablet (10 mg total) by mouth daily at bedtime, Disp: 30 tablet, Rfl: 2    famotidine (PEPCID) 20 mg tablet, Take 20 mg by mouth daily Resume on 8/14, Disp: , Rfl:     gabapentin (NEURONTIN) 250 mg/5 mL solution, Take 12 mL (600 mg total) by mouth daily at bedtime, Disp: 470 mL, Rfl: 2    glucose 4 g chewable tablet, Chew 3 tablets (12 g total) as needed for low blood sugar, Disp: 50 tablet, Rfl: 0    glucose blood (ONE TOUCH ULTRA TEST) test strip, 1 each by Other route 3 (three) times a day Use as instructed, Disp: 270 each, Rfl: 1    Incontinence Supplies (MALE URINAL) MISC, by Does not apply route daily, Disp: 6 each, Rfl: 3    insulin aspart (NovoLOG) 100 units/mL injection, Inject 8 Units under the skin 3 (three) times a day before meals plus scale  Scale - 150-200 -2 units, 201-250-4 units, 251-300 -6 units, 301-350-8 units, > 350- 10 units (Patient taking differently: Inject 8 Units under the skin 3 (three) times a day before meals plus scale  Scale - 150-200 -2 units, 201-250-4 units, 251-300 -6 units, 301-350-8 units, > 350- 10 units (up to 40 units daily)), Disp: 50 mL, Rfl: 1    insulin glargine (LANTUS) 100 units/mL subcutaneous injection, Inject 34 Units under the skin daily (Patient taking differently: Inject 35 Units under the skin daily ), Disp: 10 mL, Rfl: 3    Insulin Syringe-Needle U-100 (B-D INS SYR ULTRAFINE  3CC/30G) 30G X 1/2" 0 3 ML MISC, by Does not apply route 4 (four) times a day, Disp: 360 each, Rfl: 1    Insulin Syringe-Needle U-100 (B-D INS SYRINGE 0 5CC/30GX1/2") 30G X 1/2" 0 5 ML MISC, Inject under the skin 4 (four) times a day, Disp: 360 each, Rfl: 1    neomycin-bacitracin-polymyxin b (NEOSPORIN) ointment, Apply 1 application topically 2 (two) times a day Apply twice per day to shin wound until fully healed  If not fully healed in 5 days contact your family doctor   Next application is the evening of 8/13, Disp: , Rfl:     ONETOUCH DELICA LANCETS 34V MISC, by Does not apply route 3 (three) times a day, Disp: 270 each, Rfl: 1    diclofenac sodium (VOLTAREN) 1 %, Apply 2 g topically 3 (three) times a day (Patient not taking: Reported on 7/9/2019), Disp: 100 g, Rfl: 1    lactulose 20 g/30 mL, Take 30 mL (20 g total) by mouth daily as needed (constipation) (Patient not taking: Reported on 7/9/2019), Disp: 473 mL, Rfl: 0    LUCENTIS 0 3 MG/0 05ML, , Disp: , Rfl:     ondansetron (ZOFRAN-ODT) 4 mg disintegrating tablet, Take 1 tablet (4 mg total) by mouth every 6 (six) hours as needed for nausea or vomiting (Patient not taking: Reported on 7/9/2019), Disp: 20 tablet, Rfl: 0    trospium chloride (SANCTURA) 20 mg tablet, Take 1 tablet (20 mg total) by mouth 2 (two) times a day (Patient not taking: Reported on 7/9/2019), Disp: 60 tablet, Rfl: 5    Laboratory Results:  Results from last 7 days   Lab Units 07/05/19  1515   WHITE BLOOD CELL COUNT  Thousand/uL 7 7   HEMOGLOBIN  g/dL 12 9*   HEMATOCRIT  % 39 2   PLATELETS   Thousand/uL 198   POTASSIUM mmol/L 4 5   CHLORIDE mmol/L 110   CO2 mmol/L 21   BUN mg/dL 43*   CREATININE mg/dL 3 05*   SL AMB CALCIUM mg/dL 8 9  8 9   MAGNESIUM mg/dL 2 0       Results for orders placed or performed in visit on 07/05/19   PTH, Intact and Calcium   Result Value Ref Range    PTH, Intact 165 (H) 14 - 64 pg/mL    SL AMB CALCIUM 8 9 8 6 - 10 3 mg/dL   Magnesium   Result Value Ref Range    Magnesium, Serum 2 0 1 5 - 2 5 mg/dL   Phosphorus   Result Value Ref Range    Phosphorus, Serum 3 4 2 5 - 4 5 mg/dL   Basic metabolic panel   Result Value Ref Range    Glucose, Random 149 (H) 65 - 99 mg/dL    BUN 43 (H) 7 - 25 mg/dL    Creatinine 3 05 (H) 0 70 - 1 33 mg/dL    eGFR Non  21 (L) > OR = 60 mL/min/1 73m2    eGFR  25 (L) > OR = 60 mL/min/1 73m2    SL AMB BUN/CREATININE RATIO 14 6 - 22 (calc)    Sodium 139 135 - 146 mmol/L    Potassium 4 5 3 5 - 5 3 mmol/L    Chloride 110 98 - 110 mmol/L    CO2 21 20 - 32 mmol/L    SL AMB CALCIUM 8 9 8 6 - 10 3 mg/dL   CBC and differential   Result Value Ref Range    White Blood Cell Count 7 7 3 8 - 10 8 Thousand/uL    Red Blood Cell Count 4 59 4 20 - 5 80 Million/uL    Hemoglobin 12 9 (L) 13 2 - 17 1 g/dL    HCT 39 2 38 5 - 50 0 %    MCV 85 4 80 0 - 100 0 fL    MCH 28 1 27 0 - 33 0 pg    MCHC 32 9 32 0 - 36 0 g/dL    RDW 13 3 11 0 - 15 0 %    Platelet Count 103 235 - 400 Thousand/uL    SL AMB MPV 10 9 7 5 - 12 5 fL    Neutrophils (Absolute) 4,782 1,500 - 7,800 cells/uL    Lymphocytes (Absolute) 2,064 850 - 3,900 cells/uL    Monocytes (Absolute) 647 200 - 950 cells/uL    Eosinophils (Absolute) 177 15 - 500 cells/uL    Basophils ABS 31 0 - 200 cells/uL    Neutrophils 62 1 %    Lymphocytes 26 8 %    Monocytes 8 4 %    Eosinophils 2 3 %    Basophils PCT 0 4 %   Vitamin D 25 hydroxy   Result Value Ref Range    Vitamin D, 25-Hydroxy, Serum 28 (L) 30 - 100 ng/mL   Protein, Total w/Creat, Random Urine   Result Value Ref Range    Creatinine, Urine 96 20 - 320 mg/dL    Protein/Creat Ratio 7,542 (H) 22 - 128 mg/g creat    Total Protein, Urine 724 (H) 5 - 25 mg/dL

## 2019-07-09 NOTE — ASSESSMENT & PLAN NOTE
· Patient presented with episode of diaphoresis, shakiness, altered mental status (toxic metabolic encephalopathy) and facial asymmetry in the context of hypoglycemia with blood glucose 57  Wife at bedside who witnessed the entire event reports that symptoms immediately resolved rapidly with resolution of his hypoglycemia  Currently patient is back to baseline  · Patient is followed closely by Michelet Byers and wife reports that his blood sugars have been under very good control overall recently with his current regimen of 35 units of Lantus in the morning and 8 units t i d  with meals of short-acting NovoLog  However patient took his full dose of insulin this morning and then did not eat much    · Will observe him on Accu-Cheks with sliding scale coverage alone for now

## 2019-07-09 NOTE — ASSESSMENT & PLAN NOTE
· Creatinine noted to be 3 0    Patient met with his nephrologist today to discuss options regarding initiation of dialysis in the near future however not urgently

## 2019-07-09 NOTE — H&P
H&P- Burgess Almonte 1960, 61 y o  male MRN: [id number]    Unit/Bed#: ED 18 Encounter: [id number]    Primary Care Provider: Jared Kincaid DO   Date and time admitted to hospital: 7/9/2019 11:01 AM  * symptomatic hypoglycemia  Assessment & Plan  · Patient presented with episode of diaphoresis, shakiness, altered mental status (toxic metabolic encephalopathy) and facial asymmetry in the context of hypoglycemia with blood glucose 57  Wife at bedside who witnessed the entire event reports that symptoms immediately resolved rapidly with resolution of his hypoglycemia  Currently patient is back to baseline  · Patient is followed closely by Shreyas Toussaint and wife reports that his blood sugars have been under very good control overall recently with his current regimen of 35 units of Lantus in the morning and 8 units t i d  with meals of short-acting NovoLog  However patient took his full dose of insulin this morning and then did not eat much  · Will observe him on Accu-Cheks with sliding scale coverage alone for now    Stage 4 chronic kidney disease ([address] )  Assessment & Plan  · Creatinine noted to be 3 0  Patient met with his nephrologist today to discuss options regarding initiation of dialysis in the near future however not urgently    History of stroke  Assessment & Plan  · Although patient did have transient neurologic symptoms today, head CT was unremarkable and symptoms were more likely to be related to his hypoglycemia   · Patient with history of stroke in July of 2018 that occurred due to noncompliance with his medications according to his wife  Since that time he has been consistently taking his aspirin and high-intensity statin  · Recent carotid Doppler showed no stenosis  He has no history of paroxysmal AFib      Hypertension  Assessment & Plan  · Continue home regimen including amlodipine and Coreg    VTE Prophylaxis: low risk , ambulate  Code Status: full code  POLST: POLST is not applicable to this patient  Discussion with family: wife at bedside provides most of history    Anticipated Length of Stay:  Patient will be admitted on an Observation basis with an anticipated length of stay of less than 2 midnights  Justification for Hospital Stay: hypoglycemia with acute encephalopathy and neuro changes    Total Time for Visit, including Counseling / Coordination of Care: 1 hour  Greater than 50% of this total time spent on direct patient counseling and coordination of care  Chief Complaint:   Sent in by nephrology for facial weakness and decreased responsiveness    History of Present Illness:    Tommy Menjivar is a 61 y o  male who presents with episode of altered mental status  He was at his nephrologist's appointment today and was discussing the new initiation of dialysis when he suddenly got very nervous" and (his) "body got wet" and he became very shaky " At that point, Dr Sukhjinder Licea noted that he was less responsive and had facial asymmetry  The ambulance was called to bring the patient into the hospital, especially in the context of his known history of stroke in July 2018  However they also noted at that time that his blood sugar was only 57  They gave him sugar tablets and part of a cupcake  Wife reports that his symptoms resolved very quickly once his sugar improved  His wife does state that he only had a small piece of toast for breakfast but took his full amount of insulin in the morning  (Apparently he was told to eat a light meal today and was supposed to do a bowel prep later today in anticipation of an EGD and colonoscopy tomorrow )  At this time when I evaluated him in the ER he was feeling completely back to normal but was hungry and wanting to eat something  He was foggy on the details of what happened today  History was obtained from the patient, his wife at bedside, and also review of records from his nephrologist appointment today      Review of Systems:    Review of Systems Constitutional: Positive for diaphoresis  Negative for activity change, appetite change, chills, fatigue, fever and unexpected weight change  Respiratory: Negative for cough, chest tightness and shortness of breath  Cardiovascular: Negative for chest pain, palpitations and leg swelling  Gastrointestinal: Positive for abdominal pain (wife reports this is due to having empty stomach and is typcial for the patient) and constipation (takes meds)  Negative for diarrhea, nausea and vomiting  Has stimulator implanted for chronic bowel issues   Genitourinary: Positive for difficulty urinating (chronic, unchanged)  Musculoskeletal: Positive for arthralgias (mild left shoulder pain yesterday, tender to touch, mild)  Negative for back pain and gait problem  Skin: Negative for color change, pallor, rash and wound  Neurological: Negative for dizziness, seizures and headaches  Psychiatric/Behavioral: Positive for confusion  Past Medical and Surgical History:     Past Medical History:   Diagnosis Date    Diabetes mellitus ([facility] [address] )     Hypercholesteremia     Hyperlipidemia     Hypertension     Neuropathy     Obesity     Osteomyelitis ([facility] [address] )     last assessed 11/4/16    PVC's (premature ventricular contractions)     sees cardiology Dr Rasheeda Duong camargo    Stroke [facility])     last weeof July 2018 [address] [facility]       Past Surgical History:   Procedure Laterality Date    ABDOMINAL SURGERY      CHOLECYSTECTOMY      Percutaneous    OTHER SURGICAL HISTORY      "stimulator to control bowel movements"    [place] ESOPHAGOGASTRODUODENOSCOPY TRANSORAL DIAGNOSTIC N/A 9/27/2016    Procedure: ESOPHAGOGASTRODUODENOSCOPY (EGD); Surgeon: Elinor Palacios MD;  [facility]: [facility];   Service: Gastroenterology    [place] LAP,CHOLECYSTECTOMY N/A 2/29/2016    Procedure: LAPAROSCOPIC CHOLECYSTECTOMY ;  Surgeon: Ramon Opitz, DO;  Location[facility];  Service: General    ROTATOR CUFF REPAIR Right     TOE AMPUTATION Right 10/28/2016    Procedure: 3RD TOE AMPUTATION ;  Surgeon: Janet Bernal DPM;  [facility];  Service:        Meds/Allergies:    Prior to Admission medications    Medication Sig Start Date End Date Taking? Authorizing Provider   amLODIPine (NORVASC) 5 mg tablet Take 1 tablet (5 mg total) by mouth every 12 (twelve) hours 6/10/19  Yes Alla Baptiste DO   aspirin (ECOTRIN LOW STRENGTH) 81 mg EC tablet Take 81 mg by mouth daily Resume on 8/14   Yes Historical Provider, MD   atorvastatin (LIPITOR) 80 mg tablet Take 1 tablet (80 mg total) by mouth daily with dinner 6/10/19  Yes Alla Baptiste DO   Blood Glucose Monitoring Suppl (ONE TOUCH ULTRA MINI) w/Device KIT by Does not apply route 3 (three) times a day 11/27/18  Yes Alla Baptiste DO   Blood Pressure Monitoring (BLOOD PRESSURE CUFF) MISC Use to check blood pressure before taking blood pressure medication and 1 hour after and follow instructions provided in discharge instructions based on the readings   8/13/18  Yes Marlene Witt MD   carvedilol (COREG) 6 25 mg tablet Take 1 tablet (6 25 mg total) by mouth 2 (two) times a day with meals 10/15/18  Yes Alla Baptiste DO   Cholecalciferol (VITAMIN D3) 07805 units CAPS TAKE 1 TABLET BY MOUTH ONCE A WEEK 3/18/19  Yes BRITTANY Neil   [facility] GLUCOSE 4-6 GM-MG CHEW 3 TABLETS BY MOUTH DAILY AS NEEDED 8/15/18  Yes Historical Provider, MD   [facility] VITAMIN B-12 1000 MCG tablet TAKE 1 TABLET BY MOUTH EVERY DAY 3/27/19  Yes Jorden Coleman MD   escitalopram (LEXAPRO) 10 mg tablet Take 1 tablet (10 mg total) by mouth daily at bedtime 6/24/19  Yes Alla Baptiste DO   famotidine (PEPCID) 20 mg tablet Take 20 mg by mouth daily Resume on 8/14   Yes Historical Provider, MD   gabapentin (NEURONTIN) 250 mg/5 mL solution Take 12 mL (600 mg total) by mouth daily at bedtime 3/7/19  Yes Raj Auguste DO   glucose 4 g chewable tablet Chew 3 tablets (12 g total) as needed for low blood sugar 8/15/18  Yes Alla Baptiste DO   glucose blood (ONE TOUCH ULTRA TEST) test strip 1 each by Other route 3 (three) times a day Use as instructed 1/29/19  Yes Raj Auguste DO   Incontinence Supplies (MALE URINAL) MISC by Does not apply route daily 9/24/18  Yes Vernal Arm, DO   insulin aspart (NovoLOG) 100 units/mL injection Inject 8 Units under the skin 3 (three) times a day before meals plus scale  Scale - 150-200 -2 units, 201-250-4 units, 251-300 -6 units, 301-350-8 units, > 350- 10 units  Patient taking differently: Inject 8 Units under the skin 3 (three) times a day before meals plus scale  Scale - 150-200 -2 units, 201-250-4 units, 251-300 -6 units, 301-350-8 units, > 350- 10 units (up to 40 units daily) 1/31/19  Yes Naty Caldwell MD   insulin glargine (LANTUS) 100 units/mL subcutaneous injection Inject 34 Units under the skin daily  Patient taking differently: Inject 35 Units under the skin daily  5/1/19  Yes Lissette Brennan PA-C   Insulin Syringe-Needle U-100 (B-D INS SYR ULTRAFINE  3CC/30G) 30G X 1/2" 0 3 ML MISC by Does not apply route 4 (four) times a day 10/26/18  Yes Raj Auguste DO   Insulin Syringe-Needle U-100 (B-D INS SYRINGE 0 5CC/30GX1/2") 30G X 1/2" 0 5 ML MISC Inject under the skin 4 (four) times a day 1/28/19  Yes Raj Auguste DO   lactulose 20 g/30 mL Take 30 mL (20 g total) by mouth daily as needed (constipation) 8/17/18  Yes Raj Auguste DO   LUCENTIS 0 3 MG/0 05ML  9/11/18  Yes Historical Provider, MD Sylvia Lebron LANCETS 20D MISC by Does not apply route 3 (three) times a day 11/27/18  Yes Vernal Arm, DO                                     I have reviewed home medications with patient family member      Allergies: No Known Allergies    Social History:     Marital Status: /Civil Union   Occupation:   Patient Pre-hospital Living Situation: lives with wife  Patient Pre-hospital Level of Mobility:   Patient Pre-hospital Diet Restrictions:   Substance Use History:   Social History     Substance and Sexual Activity   Alcohol Use No Social History     Tobacco Use   Smoking Status Never Smoker   Smokeless Tobacco Never Used     Social History     Substance and Sexual Activity   Drug Use No       Family History:    non-contributory    Physical Exam:     Vitals:   Blood Pressure: (!) 171/81 (07/09/19 1330)  Pulse: 63 (07/09/19 1330)  Temperature: (!) 95 8 °F (35 4 °C) (07/09/19 1114)  Temp Source: Rectal (07/09/19 1114)  Respirations: 18 (07/09/19 1330)  Weight - Scale: 109 kg (240 lb 4 8 oz) (07/09/19 1106)  SpO2: 95 % (07/09/19 1330)    Physical Exam   Constitutional: He is oriented to person, place, and time  He appears well-developed and well-nourished  No distress  HENT:   Head: Normocephalic and atraumatic  Mouth/Throat: Oropharynx is clear and moist  No oropharyngeal exudate  edentulous   Eyes: Pupils are equal, round, and reactive to light  Conjunctivae are normal  Right eye exhibits no discharge  Left eye exhibits no discharge  No scleral icterus  Cardiovascular: Normal rate, regular rhythm and normal heart sounds  Exam reveals no gallop and no friction rub  No murmur heard  Pulmonary/Chest: Effort normal and breath sounds normal  No stridor  No respiratory distress  He has no wheezes  He has no rales  Abdominal: Soft  Bowel sounds are normal  He exhibits no distension and no mass  There is no tenderness  There is no guarding  Musculoskeletal: Normal range of motion  He exhibits no edema, tenderness or deformity  Neurological: He is alert and oriented to person, place, and time  Patient was resting comfortably when I entered but was easily awoken  He was able to follow all commands appropriately  He had no tremor, no dysmetria, and no pronator drift  No facial movement asymmetry and no dysarthria  Strength was full in bilateral upper and lower extremities  Names 3/3 items appropriately  Skin: Skin is warm and dry  No rash noted  He is not diaphoretic  No erythema  No pallor     Psychiatric: He has a normal mood and affect  His behavior is normal  Thought content normal    Vitals reviewed  Additional Data:     Lab Results: I have personally reviewed pertinent reports  Results from last 7 days   Lab Units 07/09/19  1119   WBC Thousand/uL 7 41   HEMOGLOBIN g/dL 12 6   HEMATOCRIT % 38 8   PLATELETS Thousands/uL 170   NEUTROS PCT % 68   LYMPHS PCT % 19   MONOS PCT % 10   EOS PCT % 2     Results from last 7 days   Lab Units 07/09/19  1119   SODIUM mmol/L 140   POTASSIUM mmol/L 4 0   CHLORIDE mmol/L 108   CO2 mmol/L 20*   BUN mg/dL 40*   CREATININE mg/dL 3 04*   ANION GAP mmol/L 12   CALCIUM mg/dL 9 0   ALBUMIN g/dL 3 2*   TOTAL BILIRUBIN mg/dL 0 40   ALK PHOS U/L 157*   ALT U/L 22   AST U/L 15   GLUCOSE RANDOM mg/dL 140     Results from last 7 days   Lab Units 07/09/19  1119   INR  1 01     Results from last 7 days   Lab Units 07/09/19  1247 07/09/19  1128 07/09/19  1105   POC GLUCOSE mg/dl 98 119 121               Imaging: I have personally reviewed pertinent reports  CT head without contrast   Final Result by Aj Hemphill MD (07/09 1236)      1  No acute intracranial CT abnormality  2   Sequela of remote infarcts and changes of mild chronic white matter microangiopathy  Workstation performed: [id number]             EKG, Pathology, and Other Studies Reviewed on Admission:   · EKG: NSR    Allscripts / Epic Records Reviewed: Yes     ** Please Note: This note has been constructed using a voice recognition system   **

## 2019-07-09 NOTE — PATIENT INSTRUCTIONS
1  )  Low 2 g sodium diet    2 )  Monitor weights at home    3 )  Avoid NSAIDs    4 )  Monitor blood pressure at home, call if blood pressure greater than 150/90 persistently    5 ) Set up a tour in [facility] clinic    6 ) Transplant evaluation

## 2019-07-09 NOTE — ED PROVIDER NOTES
History  Chief Complaint   Patient presents with    Hypoglycemia - Symptomatic     Pt was at a nephrology appt and started to feel faint and started to sweat  Pts BS was 57  AFter EMS gave him 225mL of D10 pts BS was 136  Pt still feels nauseated and fatigued  59-year-old male with a complex medical history including chronic kidney disease, hypertension, diabetes, and previous CVA presents to the emergency department from his nephrologist's office for evaluation of weakness  Patient's wife and family members are at the bedside to help provide history  Patient states that he does not feel well  He is nauseated and feels generally weak  Patient was at Dr Modesta Van office  They were discussing the patient's worsening renal failure and options to treat it  When they began to speak about dialysis the patient became very nervous  Following this he became diaphoretic and weak  He was reporting that he was not feeling well  The patient has been fasting today because he is scheduled to have a colonoscopy tomorrow  He has not yet started the oral colonoscopy prep  Patient did take his regular insulin this morning and ate only a small amount of toast   He was given glucose tablets and a bite of a cupcake in the nephrologist's office in upon EMS arrival his blood sugar was 57  Dr Alejos Olszewski contacted the emergency department with concerns for the patient's development of left arm weakness and left-sided facial asymmetry  He was also concerned the patient appeared poorly responsive  EMS provided the patient with D10 and upon arrival to the emergency department his blood sugar was greater than 100  The patient states that he still feels unwell  I do not appreciate any change in facial symmetry  He has no facial droop upon arrival   Patient does not have any left-sided arm weakness upon arrival   He has very mild right-sided upper extremity weakness from a previous stroke (L MCA) and appears to be at his baseline  History provided by:  Medical records, patient and relative   used: No    Hypoglycemia - Symptomatic   Initial blood sugar:  57  Blood sugar after intervention:  136 after 225 ml of D10  Severity:  Severe  Onset quality:  Gradual  Timing:  Constant  Progression:  Improving  Chronicity:  Recurrent  Diabetic status:  Controlled with insulin  Current diabetic therapy:  35 units of Lantus every morning and 8 units of NovoLog before meals  Time since last antidiabetic medication:  4 hours  Context: decreased oral intake    Context: not new diabetes diagnosis and not recent illness    Relieved by:  IV glucose  Ineffective treatments:  Eating and oral glucose  Associated symptoms: decreased responsiveness, sweats and weakness    Associated symptoms: no seizures, no shortness of breath, no tremors, no visual change and no vomiting    Risk factors: kidney disease        Prior to Admission Medications   Prescriptions Last Dose Informant Patient Reported? Taking? Blood Glucose Monitoring Suppl (ONE TOUCH ULTRA MINI) w/Device KIT  Spouse/Significant Other No Yes   Sig: by Does not apply route 3 (three) times a day   Blood Pressure Monitoring (BLOOD PRESSURE CUFF) MISC  Spouse/Significant Other No Yes   Sig: Use to check blood pressure before taking blood pressure medication and 1 hour after and follow instructions provided in discharge instructions based on the readings     [employer] GLUCOSE 4-6 GM-MG  Spouse/Significant Other Yes Yes   Sig: CHEW 3 TABLETS BY MOUTH DAILY AS NEEDED   [employer] VITAMIN B-12 1000 MCG tablet  Spouse/Significant Other No Yes   Sig: TAKE 1 TABLET BY MOUTH EVERY DAY   Cholecalciferol (VITAMIN D3) 97472 units CAPS  Spouse/Significant Other No Yes   Sig: TAKE 1 TABLET BY MOUTH ONCE A WEEK   Incontinence Supplies (MALE URINAL) MISC  Spouse/Significant Other No Yes   Sig: by Does not apply route daily   Insulin Syringe-Needle U-100 (B-D INS SYR ULTRAFINE  3CC/30G) 30G X 1/2" 0 3 ML MISC Spouse/Significant Other No Yes   Sig: by Does not apply route 4 (four) times a day   Insulin Syringe-Needle U-100 (B-D INS SYRINGE 0 5CC/30GX1/2") 30G X 1/2" 0 5 ML MISC  Spouse/Significant Other No Yes   Sig: Inject under the skin 4 (four) times a day   LUCENTIS 0 3 MG/0 05ML  Spouse/Significant Other Yes Yes   ONETOUCH DELICA LANCETS 82D MISC  Spouse/Significant Other No Yes   Sig: by Does not apply route 3 (three) times a day   amLODIPine (NORVASC) 5 mg tablet  Spouse/Significant Other No Yes   Sig: Take 1 tablet (5 mg total) by mouth every 12 (twelve) hours   aspirin (ECOTRIN LOW STRENGTH) 81 mg EC tablet  Spouse/Significant Other Yes Yes   Sig: Take 81 mg by mouth daily Resume on    atorvastatin (LIPITOR) 80 mg tablet  Spouse/Significant Other No Yes   Sig: Take 1 tablet (80 mg total) by mouth daily with dinner   carvedilol (COREG) 6 25 mg tablet  Spouse/Significant Other No Yes   Sig: Take 1 tablet (6 25 mg total) by mouth 2 (two) times a day with meals   escitalopram (LEXAPRO) 10 mg tablet   No Yes   Sig: Take 1 tablet (10 mg total) by mouth daily at bedtime   famotidine (PEPCID) 20 mg tablet  Spouse/Significant Other Yes Yes   Sig: Take 20 mg by mouth daily Resume on    gabapentin (NEURONTIN) 250 mg/5 mL solution  Spouse/Significant Other No Yes   Sig: Take 12 mL (600 mg total) by mouth daily at bedtime   glucose 4 g chewable tablet  Spouse/Significant Other No Yes   Sig: Chew 3 tablets (12 g total) as needed for low blood sugar   glucose blood (ONE TOUCH ULTRA TEST) test strip  Spouse/Significant Other No Yes   Si each by Other route 3 (three) times a day Use as instructed   insulin aspart (NovoLOG) 100 units/mL injection  Spouse/Significant Other No Yes   Sig: Inject 8 Units under the skin 3 (three) times a day before meals plus scale  Scale - 150-200 -2 units, 201-250-4 units, 251-300 -6 units, 301-350-8 units, > 350- 10 units   Patient taking differently: Inject 8 Units under the skin 3 (three) times a day before meals plus scale  Scale - 150-200 -2 units, 201-250-4 units, 251-300 -6 units, 301-350-8 units, > 350- 10 units (up to 40 units daily)   insulin glargine (LANTUS) 100 units/mL subcutaneous injection  Spouse/Significant Other No Yes   Sig: Inject 34 Units under the skin daily   Patient taking differently: Inject 35 Units under the skin daily    lactulose 20 g/30 mL  Spouse/Significant Other No Yes   Sig: Take 30 mL (20 g total) by mouth daily as needed (constipation)      Facility-Administered Medications: None       Past Medical History:   Diagnosis Date    Diabetes mellitus ([facility] [address] )     Hypercholesteremia     Hyperlipidemia     Hypertension     Neuropathy     Obesity     [facility]     last assessed 11/4/16    PVC's (premature ventricular contractions)     sees cardiology Dr Soco camargo    [facility])     last weeof July 2018 [facility]       Past Surgical History:   Procedure Laterality Date    ABDOMINAL SURGERY      CHOLECYSTECTOMY      Percutaneous    OTHER SURGICAL HISTORY      "stimulator to control bowel movements"    AL ESOPHAGOGASTRODUODENOSCOPY TRANSORAL DIAGNOSTIC N/A 9/27/2016    Procedure: ESOPHAGOGASTRODUODENOSCOPY (EGD); Surgeon: Tressa David MD;  [facility];   Service: Gastroenterology    AL LAP,CHOLECYSTECTOMY N/A 2/29/2016    Procedure: LAPAROSCOPIC CHOLECYSTECTOMY ;  Surgeon: Benny Brice DO;  [facility];  Service: General    ROTATOR CUFF REPAIR Right     TOE AMPUTATION Right 10/28/2016    Procedure: 3RD TOE AMPUTATION ;  Surgeon: Maria Victoria Cardona DPM;  [facility];  Service:        Family History   Problem Relation Age of Onset    Leukemia Mother     Liver disease Mother     Lung cancer Mother         heavy smoker - 3 ppd    Heart disease Father     Liver disease Father     Multiple myeloma Sister     Breast cancer Sister     Urolithiasis Family     Alcohol abuse Neg Hx     Depression Neg Hx     Drug abuse Neg Hx     Substance Abuse Neg Hx     Mental illness Neg Hx      I have reviewed and agree with the history as documented  Social History     Tobacco Use    Smoking status: Never Smoker    Smokeless tobacco: Never Used   Substance Use Topics    Alcohol use: No    Drug use: No        Review of Systems   Constitutional: Positive for decreased responsiveness and diaphoresis  Negative for appetite change  Respiratory: Negative for chest tightness and shortness of breath  Gastrointestinal: Negative for vomiting  Genitourinary: Negative for dysuria  Musculoskeletal: Negative for back pain and neck pain  Neurological: Positive for weakness  Negative for tremors, seizures, facial asymmetry, light-headedness and headaches  All other systems reviewed and are negative  Physical Exam  Physical Exam   Constitutional: He is oriented to person, place, and time  Vital signs are normal  He appears well-developed and well-nourished  He appears lethargic  He appears distressed  HENT:   Head: Normocephalic and atraumatic  Mouth/Throat: Mucous membranes are dry  Eyes: Pupils are equal, round, and reactive to light  Conjunctivae and EOM are normal  No scleral icterus  Neck: Normal range of motion  Neck supple  Cardiovascular: Normal rate, regular rhythm, normal heart sounds and intact distal pulses  No murmur heard  Pulmonary/Chest: Effort normal  No accessory muscle usage  No respiratory distress  He has decreased breath sounds in the right lower field and the left lower field  He exhibits no tenderness  Abdominal: Soft  Normal appearance and bowel sounds are normal  He exhibits no distension  There is tenderness in the right upper quadrant  There is no rebound and no guarding  Musculoskeletal: Normal range of motion  He exhibits no edema, tenderness or deformity  Lymphadenopathy:     He has no cervical adenopathy     Neurological: He is oriented to person, place, and time  He has normal strength and normal reflexes  He appears lethargic  No cranial nerve deficit or sensory deficit  Coordination normal    Skin: Skin is warm and intact  No rash noted  He is diaphoretic  Psychiatric: His behavior is normal  Judgment and thought content normal  His mood appears anxious  Nursing note and vitals reviewed        Vital Signs  ED Triage Vitals   Temperature Pulse Respirations Blood Pressure SpO2   07/09/19 1114 07/09/19 1105 07/09/19 1105 07/09/19 1105 07/09/19 1105   (!) 95 8 °F (35 4 °C) 67 18 158/76 93 %      Temp Source Heart Rate Source Patient Position - Orthostatic VS BP Location FiO2 (%)   07/09/19 1114 07/09/19 1105 07/09/19 1105 07/09/19 1105 --   Rectal Monitor Lying Right arm       Pain Score       --                  Vitals:    07/09/19 1105 07/09/19 1230 07/09/19 1330   BP: 158/76 165/79 (!) 171/81   Pulse: 67 65 63   Patient Position - Orthostatic VS: Lying Lying Lying         Visual Acuity      ED Medications  Medications   sodium chloride 0 9 % bolus 500 mL (0 mL Intravenous Stopped 7/9/19 1230)       Diagnostic Studies  Results Reviewed     Procedure Component Value Units Date/Time    Fingerstick Glucose (POCT) [[id number]]  (Normal) Collected:  07/09/19 1247    Lab Status:  Final result Updated:  07/09/19 1249     POC Glucose 98 mg/dl     Troponin I [[id number]]  (Normal) Collected:  07/09/19 1119    Lab Status:  Final result Specimen:  Blood from Arm, Left Updated:  07/09/19 1149     Troponin I <0 02 ng/mL     Comprehensive metabolic panel [[id number]]  (Abnormal) Collected:  07/09/19 1119    Lab Status:  Final result Specimen:  Blood from Arm, Left Updated:  07/09/19 1147     Sodium 140 mmol/L      Potassium 4 0 mmol/L      Chloride 108 mmol/L      CO2 20 mmol/L      ANION GAP 12 mmol/L      BUN 40 mg/dL      Creatinine 3 04 mg/dL      Glucose 140 mg/dL      Calcium 9 0 mg/dL      AST 15 U/L      ALT 22 U/L      Alkaline Phosphatase 157 U/L Total Protein 7 4 g/dL      Albumin 3 2 g/dL      Total Bilirubin 0 40 mg/dL      eGFR 21 ml/min/1 73sq m     Narrative:       National Kidney Disease Foundation guidelines for Chronic Kidney Disease (CKD):     Stage 1 with normal or high GFR (GFR > 90 mL/min/1 73 square meters)    Stage 2 Mild CKD (GFR = 60-89 mL/min/1 73 square meters)    Stage 3A Moderate CKD (GFR = 45-59 mL/min/1 73 square meters)    Stage 3B Moderate CKD (GFR = 30-44 mL/min/1 73 square meters)    Stage 4 Severe CKD (GFR = 15-29 mL/min/1 73 square meters)    Stage 5 End Stage CKD (GFR <15 mL/min/1 73 square meters)  Note: GFR calculation is accurate only with a steady state creatinine    Protime-INR [[id number]]  (Normal) Collected:  07/09/19 1119    Lab Status:  Final result Specimen:  Blood from Arm, Left Updated:  07/09/19 1140     Protime 12 7 seconds      INR 1 01    APTT [[id number]]  (Normal) Collected:  07/09/19 1119    Lab Status:  Final result Specimen:  Blood from Arm, Left Updated:  07/09/19 1140     PTT 34 seconds     Fingerstick Glucose (POCT) [[id number]]  (Normal) Collected:  07/09/19 1128    Lab Status:  Final result Updated:  07/09/19 1132     POC Glucose 119 mg/dl     CBC and differential [[id number]] Collected:  07/09/19 1119    Lab Status:  Final result Specimen:  Blood from Arm, Left Updated:  07/09/19 1130     WBC 7 41 Thousand/uL      RBC 4 42 Million/uL      Hemoglobin 12 6 g/dL      Hematocrit 38 8 %      MCV 88 fL      MCH 28 5 pg      MCHC 32 5 g/dL      RDW 13 5 %      MPV 10 9 fL      Platelets 523 Thousands/uL      nRBC 0 /100 WBCs      Neutrophils Relative 68 %      Immat GRANS % 1 %      Lymphocytes Relative 19 %      Monocytes Relative 10 %      Eosinophils Relative 2 %      Basophils Relative 0 %      Neutrophils Absolute 4 98 Thousands/µL      Immature Grans Absolute 0 04 Thousand/uL      Lymphocytes Absolute 1 44 Thousands/µL      Monocytes Absolute 0 75 Thousand/µL      Eosinophils Absolute 0 17 Thousand/µL      Basophils Absolute 0 03 Thousands/µL     Fingerstick Glucose (POCT) [[id number]]  (Normal) Collected:  07/09/19 1105    Lab Status:  Final result Updated:  07/09/19 1106     POC Glucose 121 mg/dl                  CT head without contrast   Final Result by Frank Carpio MD (07/09 1236)      1  No acute intracranial CT abnormality  2   Sequela of remote infarcts and changes of mild chronic white matter microangiopathy  Workstation performed: [id number]                    Procedures  ECG 12 Lead Documentation Only  Date/Time: 7/9/2019 1:14 PM  Performed by: Benita Mejia DO  Authorized by: Benita Mejia DO     Indications / Diagnosis:  Weakness/hypoglycemia  Patient location:  ED  Previous ECG:     Previous ECG:  Compared to current    Comparison ECG info:  10/18    Similarity:  No change  Interpretation:     Interpretation: non-specific    Quality:     Tracing quality:  Limited by artifact  Rate:     ECG rate:  65    ECG rate assessment: normal    Rhythm:     Rhythm: sinus rhythm    Ectopy:     Ectopy: none    QRS:     QRS axis:  Normal  ST segments:     ST segments:  Non-specific           ED Course  ED Course as of Jul 09 1423   Tue Jul 09, 2019   1145 Patient re-evaluated by me  Family is at the bedside  They have no concerns with his current state of being  He is very drowsy but easily arousable  He states that he naps frequently throughout the day  His speech sounds slightly weak but is appropriate and clear  They feel that his speech is at his baseline  Patient has no visual deficits  Strength was reassessed and is intact/at baseline                  Stroke Assessment     Row Name 07/09/19 1112             NIH Stroke Scale    Interval  Baseline      Level of Consciousness (1a )  0      LOC Questions (1b )  0      LOC Commands (1c )  0      Best Gaze (2 )  0      Visual (3 )  0      Facial Palsy (4 )  0      Motor Arm, Left (5a )  0      Motor Arm, Right (5b )  1 slight drift - wife reports mild RUE residual deficit from previous stroke 1 year ago      Motor Leg, Left (6a )  0      Motor Leg, Right (6b )  0      Limb Ataxia (7 )  0      Sensory (8 )  0      Best Language (9 )  0      Dysarthria (10 )  0      Extinction and Inattention (11 ) (Formerly Neglect)  0      Total  1          First Filed Value   TPA Decision  Patient not a TPA candidate  Patient is not a candidate options  Symptoms resolved/clearly non disabling  [pt  was hypoglycemic and hypothermic, symptoms imporved by my exam, no facial droop or left arm weakness appreciated  Pt  has residual RUE weakness from previous stroke which gives him the score of 1]                        MDM  Number of Diagnoses or Management Options  CKD (chronic kidney disease): established and worsening  Hypoglycemia: new and requires workup  Hypothermia: new and requires workup  Transient neurologic deficit: new and requires workup     Amount and/or Complexity of Data Reviewed  Clinical lab tests: ordered and reviewed  Tests in the radiology section of CPT®: ordered and reviewed  Tests in the medicine section of CPT®: ordered and reviewed  Decide to obtain previous medical records or to obtain history from someone other than the patient: yes  Obtain history from someone other than the patient: yes  Discuss the patient with other providers: yes  Independent visualization of images, tracings, or specimens: yes    Risk of Complications, Morbidity, and/or Mortality  General comments: 51-year-old male presents with transient neurologic deficit while hypoglycemic  Will admit to observation for serial blood sugar checks and to monitor for recurrence of symptoms      Patient Progress  Patient progress: stable      Disposition  Final diagnoses:   Hypoglycemia   Transient neurologic deficit   Hypothermia   CKD (chronic kidney disease)     Time reflects when diagnosis was documented in both MDM as applicable and the Disposition within this note Time User Action Codes Description Comment    7/9/2019 12:59 PM Kimberley Pelletier [E16 2] Hypoglycemia     7/9/2019 12:59 PM Kimberley Pelletier [R29 [id number]] Transient neurologic deficit     7/9/2019  1:02 PM Kimberley Pelletier Malena Norse  XXXA] Hypothermia     7/9/2019  1:02 PM Kimberley Pelletier [N18 9] CKD (chronic kidney disease)       ED Disposition     ED Disposition Condition Date/Time Comment    Admit Stable Tue Jul 9, 2019  1:00 PM Case was discussed with Dr Murphy Dougherty and the patient's admission status was agreed to be Admission Status: observation status to the service of Dr Murphy Dougherty   Follow-up Information    None         Patient's Medications   Discharge Prescriptions    No medications on file     No discharge procedures on file      ED Provider  Electronically Signed by           Manjinder Neri DO  07/09/19 1428

## 2019-07-09 NOTE — ASSESSMENT & PLAN NOTE
· Although patient did have transient neurologic symptoms today, head CT was unremarkable and symptoms were more likely to be related to his hypoglycemia   · Patient with history of stroke in July of 2018 that occurred due to noncompliance with his medications according to his wife  Since that time he has been consistently taking his aspirin and high-intensity statin  · Recent carotid Doppler showed no stenosis  He has no history of paroxysmal AFib

## 2019-07-10 VITALS
HEART RATE: 70 BPM | RESPIRATION RATE: 18 BRPM | SYSTOLIC BLOOD PRESSURE: 168 MMHG | DIASTOLIC BLOOD PRESSURE: 82 MMHG | HEIGHT: 68 IN | TEMPERATURE: 97.8 F | OXYGEN SATURATION: 97 % | BODY MASS INDEX: 36.09 KG/M2 | WEIGHT: 238.1 LBS

## 2019-07-10 DIAGNOSIS — I10 ESSENTIAL HYPERTENSION: Chronic | ICD-10-CM

## 2019-07-10 LAB
ATRIAL RATE: 71 BPM
EST. AVERAGE GLUCOSE BLD GHB EST-MCNC: 160 MG/DL
GLUCOSE SERPL-MCNC: 128 MG/DL (ref 65–140)
HBA1C MFR BLD: 7.2 % (ref 4.2–6.3)
P AXIS: 35 DEGREES
PR INTERVAL: 180 MS
QRS AXIS: 3 DEGREES
QRSD INTERVAL: 108 MS
QT INTERVAL: 406 MS
QTC INTERVAL: 441 MS
T WAVE AXIS: -24 DEGREES
VENTRICULAR RATE: 71 BPM

## 2019-07-10 PROCEDURE — 83036 HEMOGLOBIN GLYCOSYLATED A1C: CPT | Performed by: PHYSICIAN ASSISTANT

## 2019-07-10 PROCEDURE — 99217 PR OBSERVATION CARE DISCHARGE MANAGEMENT: CPT | Performed by: PHYSICIAN ASSISTANT

## 2019-07-10 PROCEDURE — 82948 REAGENT STRIP/BLOOD GLUCOSE: CPT

## 2019-07-10 PROCEDURE — 93010 ELECTROCARDIOGRAM REPORT: CPT | Performed by: INTERNAL MEDICINE

## 2019-07-10 RX ORDER — INSULIN GLARGINE 100 [IU]/ML
20 INJECTION, SOLUTION SUBCUTANEOUS DAILY
Qty: 10 ML | Refills: 0 | Status: ON HOLD
Start: 2019-07-10 | End: 2019-08-25 | Stop reason: SDUPTHER

## 2019-07-10 RX ORDER — CARVEDILOL 6.25 MG/1
6.25 TABLET ORAL 2 TIMES DAILY WITH MEALS
Qty: 180 TABLET | Refills: 1 | Status: SHIPPED | OUTPATIENT
Start: 2019-07-10 | End: 2020-04-01 | Stop reason: SDUPTHER

## 2019-07-10 RX ADMIN — CARVEDILOL 6.25 MG: 6.25 TABLET, FILM COATED ORAL at 08:58

## 2019-07-10 RX ADMIN — AMLODIPINE BESYLATE 5 MG: 5 TABLET ORAL at 06:31

## 2019-07-10 RX ADMIN — FAMOTIDINE 20 MG: 20 TABLET ORAL at 08:58

## 2019-07-10 RX ADMIN — ASPIRIN 81 MG: 81 TABLET, COATED ORAL at 08:58

## 2019-07-10 RX ADMIN — CYANOCOBALAMIN TAB 500 MCG 1000 MCG: 500 TAB at 08:59

## 2019-07-10 NOTE — UTILIZATION REVIEW
Initial Clinical Review    Admission: Date/Time/Statement: N/A @ N/A     Orders Placed This Encounter   Procedures    Place in Observation     Standing Status:   Standing     Number of Occurrences:   1     Order Specific Question:   Admitting Physician     Answer:   Baron Contreras     Order Specific Question:   Level of Care     Answer:   Med Surg [16]     ED Arrival Information     Expected Arrival Acuity Means of Arrival Escorted By Service Admission Type    - 7/9/2019 11:00 Emergent Ambulance [address] Emergency    Arrival Complaint    -        Chief Complaint   Patient presents with    Hypoglycemia - Symptomatic     Pt was at a nephrology appt and started to feel faint and started to sweat  Pts BS was 57  AFter EMS gave him 225mL of D10 pts BS was 136  Pt still feels nauseated and fatigued  Assessment/Plan: 61 y o  male who presents to ED by EMS with episode of altered mental status  He was at his nephrologist's appointment today and was discussing the new initiation of dialysis when he suddenly got very nervous" and (his) "body got wet" and he became very shaky " At that point, Dr Bhumi Brandt noted that he was less responsive and had facial asymmetry  The ambulance was called to bring the patient into the hospital, especially in the context of his known history of stroke in July 2018  However they also noted at that time that his blood sugar was only 57  They gave him sugar tablets and part of a cupcake  Wife reports that his symptoms resolved very quickly once his sugar improved  His wife does state that he only had a small piece of toast for breakfast but took his full amount of insulin in the morning    (Apparently he was told to eat a light meal today and was supposed to do a bowel prep later today in anticipation of an EGD and colonoscopy tomorrow )  At this time when I evaluated him in the ER he was feeling completely back to normal but was hungry and wanting to eat something   symptomatic hypoglycemia  Assessment & Plan  · Patient presented with episode of diaphoresis, shakiness, altered mental status (toxic metabolic encephalopathy) and facial asymmetry in the context of hypoglycemia with blood glucose 57  Wife at bedside who witnessed the entire event reports that symptoms immediately resolved rapidly with resolution of his hypoglycemia  Currently patient is back to baseline  · Patient is followed closely by Shiva Carmichael and wife reports that his blood sugars have been under very good control overall recently with his current regimen of 35 units of Lantus in the morning and 8 units t i d  with meals of short-acting NovoLog  However patient took his full dose of insulin this morning and then did not eat much  · Will observe him on Accu-Cheks with sliding scale coverage alone for now  Stage 4 chronic kidney disease ([facility] [address] )  Assessment & Plan  · Creatinine noted to be 3 0  Patient met with his nephrologist today to discuss options regarding initiation of dialysis in the near future however not urgently  History of stroke  Assessment & Plan  · Although patient did have transient neurologic symptoms today, head CT was unremarkable and symptoms were more likely to be related to his hypoglycemia   · Patient with history of stroke in July of 2018 that occurred due to noncompliance with his medications according to his wife  Since that time he has been consistently taking his aspirin and high-intensity statin  · Recent carotid Doppler showed no stenosis  He has no history of paroxysmal AFib    Hypertension  Assessment & Plan  · Continue home regimen including amlodipine and Coreg     ED Triage Vitals   Temperature Pulse Respirations Blood Pressure SpO2   07/09/19 1114 07/09/19 1105 07/09/19 1105 07/09/19 1105 07/09/19 1105   (!) 95 8 °F (35 4 °C) 67 18 158/76 93 %      Temp Source Heart Rate Source Patient Position - Orthostatic VS BP Location FiO2 (%)   07/09/19 1114 07/09/19 1105 07/09/19 1105 07/09/19 1105 --   Rectal Monitor Lying Right arm       Pain Score       07/09/19 1535       6        Wt Readings from Last 1 Encounters:   07/09/19 108 kg (238 lb 1 6 oz)     Additional Vital Signs:   07/10/19 0700  97 8 °F (36 6 °C)  70  18  168/82  118  97 %  None (Room air)  Lying   07/10/19 0631        121/63           07/09/19 2203  98 2 °F (36 8 °C)  70  18  164/79  112  96 %  None (Room air)  Lying   07/09/19 1711    70        98 %  None (Room air)     07/09/19 1615  98 °F (36 7 °C)  80  18  139/79  101  96 %  None (Room air)  Lying   07/09/19 1507    72  18  141/68    96 %  None (Room air)  Lying   07/09/19 1330    63  18  171/81Abnormal     95 %  None (Room air)  Lying   07/09/19 1230    65  18  165/79    95 %  None (Room air)  Lying   07/09/19 1114  95 8 °F (35 4 °C)Abnormal                  07/09/19 1105    67  18  158/76    93 %  None (Room air)  Lying      Weights (last 14 days)     Date/Time  Weight  Height   07/09/19 1615  108 kg (238 lb 1 6 oz)  5' 8" (1 727 m)   07/09/19 1106  109 kg (240 lb 4 8 oz)         Pertinent Labs/Diagnostic Test Results: 7/9/2019 EKG: nsr with non specific ST segment changes  CT head without contrast   1  No acute intracranial CT abnormality        2  Sequela of remote infarcts and changes of mild chronic white matter microangiopathy            Results from last 7 days   Lab Units 07/09/19  1119 07/05/19  1515   WBC Thousand/uL 7 41  --    WHITE BLOOD CELL COUNT  Thousand/uL  --  7 7   HEMOGLOBIN g/dL 12 6  --    HEMOGLOBIN  g/dL  --  12 9*   HEMATOCRIT % 38 8  --    HEMATOCRIT  %  --  39 2   PLATELETS Thousands/uL 170  --    PLATELETS  Thousand/uL  --  198   NEUTROS ABS Thousands/µL 4 98  --    NEUTROS ABS   cells/uL  --  4,782         Results from last 7 days   Lab Units 07/09/19  1119 07/05/19  1515   SODIUM mmol/L 140 139   POTASSIUM mmol/L 4 0 4 5   CHLORIDE mmol/L 108 110   CO2 mmol/L 20* 21   ANION GAP mmol/L 12  -- BUN mg/dL 40* 43*   CREATININE mg/dL 3 04* 3 05*   EGFR ml/min/1 73sq m 21  --    SL AMB CALCIUM mg/dL  --  8 9  8 9   CALCIUM mg/dL 9 0  --    MAGNESIUM mg/dL  --  2 0     Results from last 7 days   Lab Units 07/09/19  1119   AST U/L 15   ALT U/L 22   ALK PHOS U/L 157*   TOTAL PROTEIN g/dL 7 4   ALBUMIN g/dL 3 2*   TOTAL BILIRUBIN mg/dL 0 40     Results from last 7 days   Lab Units 07/10/19  0749 07/09/19  2116 07/09/19  1642 07/09/19  1503 07/09/19  1247 07/09/19  1128 07/09/19  1105   POC GLUCOSE mg/dl 128 151* 161* 150* 98 119 121     Results from last 7 days   Lab Units 07/09/19  1119 07/05/19  1515   GLUCOSE RANDOM mg/dL 140 149*         Results from last 7 days   Lab Units 07/09/19  1119   TROPONIN I ng/mL <0 02         Results from last 7 days   Lab Units 07/09/19  1119   PROTIME seconds 12 7   INR  1 01   PTT seconds 34         Results from last 7 days   Lab Units 07/09/19  1455 07/05/19  1515   CLARITY UA  Clear  --    COLOR UA  Yellow  --    SPEC GRAV UA  1 025  --    PH UA  5 0  --    GLUCOSE UA mg/dl 100 (1/10%)*  --    KETONES UA mg/dl Negative  --    BLOOD UA  Moderate*  --    PROTEIN UA mg/dl >=300* 724*   NITRITE UA  Negative  --    BILIRUBIN UA  Negative  --    UROBILINOGEN UA E U /dl 0 2  --    LEUKOCYTES UA  Negative  --    WBC UA /hpf 0-1*  --    RBC UA /hpf 0-1*  --    BACTERIA UA /hpf None Seen  --    EPITHELIAL CELLS WET PREP /hpf Occasional  --    CREATININE UR mg/dL  --  96     ED Treatment:   Medication Administration from 07/09/2019 1100 to 07/09/2019 1531       Date/Time Order Dose Route Action Action by Comments     07/09/2019 1230 sodium chloride 0 9 % bolus 500 mL 0 mL Intravenous Stopped Carlito Campoverde RN      07/09/2019 1130 sodium chloride 0 9 % bolus 500 mL 500 mL Intravenous New Bag Carlito Campoverde RN         Past Medical History:   Diagnosis Date    Diabetes mellitus ([address] )     Hypercholesteremia     Hyperlipidemia     Hypertension     Neuropathy     Obesity  Osteomyelitis ([facility] [address] )     last assessed 11/4/16    PVC's (premature ventricular contractions)     sees cardiology Dr Deon Aponte camargo    Stroke [facility])     last weeof July 2018 [address] [facility]     Present on Admission:   Stage 4 chronic kidney disease ([facility] [address] )   symptomatic hypoglycemia    Admitting Diagnosis: Hypothermia [T68  XXXA]  Hypoglycemia [E16 2]  CKD (chronic kidney disease) [N18 9]  Transient neurologic deficit [R29 818]  Age/Sex: 61 y o  male  Admission Orders:  Peripheral IV  Ambulate patient  Regular diet  Up OOB      Current Facility-Administered Medications:  amLODIPine 5 mg Oral Q12H   aspirin 81 mg Oral Daily   atorvastatin 80 mg Oral Daily With Dinner   carvedilol 6 25 mg Oral BID With Meals   cyanocobalamin 1,000 mcg Oral Daily   escitalopram 10 mg Oral HS   famotidine 20 mg Oral Daily   gabapentin 600 mg Oral HS   insulin lispro 1-6 Units Subcutaneous TID AC   lactulose 20 g Oral Daily PRN   ondansetron 4 mg Intravenous Q4H PRN     Network Utilization Review Department  Phone: [phone]; Fax [phone]  [email or web address]  ATTENTION: Please call with any questions or concerns to [phone]  and carefully listen to the prompts so that you are directed to the right person  Send all requests for admission clinical reviews, approved or denied determinations and any other requests to fax [phone]   All voicemails are confidential

## 2019-07-10 NOTE — ASSESSMENT & PLAN NOTE
· Although patient did have transient neurologic symptoms, head CT was unremarkable and symptoms were more likely to be related to his hypoglycemia   · Patient with history of stroke in July of 2018 that occurred due to noncompliance with his medications according to his wife  Since that time he has been consistently taking his aspirin and high-intensity statin  · Recent carotid Doppler showed no stenosis  He has no history of paroxysmal AFib

## 2019-07-10 NOTE — DISCHARGE SUMMARY
Discharge- Miroslava Stone 1960, 61 y o  male MRN: [id number]    Unit/Bed#: -01 Encounter: [id number]    Primary Care Provider: Gisele Mariee DO   Date and time admitted to hospital: 7/9/2019 11:01 AM  * symptomatic hypoglycemia  Assessment & Plan  · Patient presented with episode of diaphoresis, shakiness, altered mental status (toxic metabolic encephalopathy) and facial asymmetry in the context of hypoglycemia with blood glucose 57  Wife witnessed the entire event and reported that symptoms immediately resolved rapidly with resolution of his hypoglycemia  Currently patient is back to baseline  · Patient is followed by Cassaundra Klinefelter and wife reports that his blood sugars have been under very good control overall recently with his current regimen of 35 units of Lantus in the morning and 8 units t i d  with meals of short-acting NovoLog  However patient took his full dose of insulin the morning of the event and then did not eat much  · Patient was observed overnight in the hospital without any of his usual home insulin and blood sugars were noted to be quite good  Therefore at discharge we will need to drastically cut back on his regimen to prevent further episodes of hypoglycemia  I reviewed this directly with his wife  Will recommend Lantus 20 units and mealtime insulin 4 units  Also asked them to call endocrinology to get an appointment as soon as possible  · A1c noted to be 7 2      Stage 4 chronic kidney disease ([facility] [address] )  Assessment & Plan  · Creatinine noted to be 3 0    Patient met with his nephrologist to discuss options regarding initiation of dialysis in the near future however not urgently    History of stroke  Assessment & Plan  · Although patient did have transient neurologic symptoms, head CT was unremarkable and symptoms were more likely to be related to his hypoglycemia   · Patient with history of stroke in July of 2018 that occurred due to noncompliance with his medications according to his wife   Since that time he has been consistently taking his aspirin and high-intensity statin  · Recent carotid Doppler showed no stenosis  He has no history of paroxysmal AFib  Hypertension  Assessment & Plan  · Continue home regimen including amlodipine and Coreg    Discharging Physician / Practitioner: Cori Gosselin, PA-C  PCP: Carmencita Cassidy DO  Admission Date:   Admission Orders (From admission, onward)    Ordered        07/09/19 1302  Place in Observation  Once             Discharge Date: 07/10/19    Resolved Problems  Date Reviewed: 7/10/2019    None          Consultations During Hospital Stay:  · none    Procedures Performed:   · Head CT no acute changes, evidence of old cva    Significant Findings / Test Results:   · As above    Incidental Findings:   · none     Test Results Pending at Discharge (will require follow up):   · none     Outpatient Tests Requested:  · none    Complications:  none    Reason for Admission:  Symptomatic hypoglycemia    Hospital Course:     Tommy Menjivar is a 61 y o  male patient who originally presented to the hospital on 7/9/2019 due to symptomatic hypoglycemia with blood sugar of 57  Patient was at his nephrologist's office when he had sudden episode of altered mental status, facial droop, shaking and diaphoresis  He was brought over by ambulance to the hospital and hyperglycemia was effectively treated  Patient rapidly returned back to baseline  He had a CT scan of the head which was unremarkable aside from evidence of old stroke  He has been compliant with his aspirin and statin and is also up-to-date with carotid Dopplers  It was felt to be very unlikely that he was having a recurrent stroke especially with the rapid resolution in his symptoms upon return of his blood sugars to normal range  We felt that the hypoglycemia was provoked by patient taking his full doses of insulin but not having much breakfast prior to his doctor's appointment    Of note his A1c is quite good at 7 2 and his blood sugars during the hospitalization were normal without use of any insulin  Therefore I have recommended that we decrease his insulin doses at discharge, and have him follow up closely with endocrinology  He will continue to follow up with Nephrology regarding his progressive chronic kidney disease and will otherwise remained on his usual antihypertensive medications at discharge  Please see above list of diagnoses and related plan for additional information  Condition at Discharge: stable     Discharge Day Visit / Exam:     Subjective:  Patient reports that he feels very good this morning and wants to go home  He offers no complaints or concerns from overnight  Vitals: Blood Pressure: 168/82 (07/10/19 0700)  Pulse: 70 (07/10/19 0700)  Temperature: 97 8 °F (36 6 °C) (07/10/19 0700)  Temp Source: Oral (07/10/19 0700)  Respirations: 18 (07/10/19 0700)  Height: 5' 8" (172 7 cm) (07/09/19 1615)  Weight - Scale: 108 kg (238 lb 1 6 oz) (07/09/19 1615)  SpO2: 97 % (07/10/19 0700)  Exam:   Physical Exam   Constitutional: He appears well-developed and well-nourished  No distress  Patient is sitting up in bed eating breakfast, clinically nontoxic and well-appearing   HENT:   Head: Atraumatic  Edentulous   Eyes: Conjunctivae are normal  Right eye exhibits no discharge  Left eye exhibits no discharge  No scleral icterus  Cardiovascular: Normal rate, regular rhythm and normal heart sounds  Exam reveals no friction rub  No murmur heard  Pulmonary/Chest: Effort normal and breath sounds normal  No stridor  No respiratory distress  He has no wheezes  He has no rales  Abdominal: Soft  There is no guarding  Soft obese abdomen   Musculoskeletal: He exhibits no edema  Neurological: He is alert  Awake alert interactive  Follows commands appropriately  No tremor  No confusion  No dysarthria   strength is full in bilateral upper lower extremities    No facial asymmetry   Skin: Skin is warm and dry  No rash noted  He is not diaphoretic  No erythema  No pallor  Psychiatric: He has a normal mood and affect  His behavior is normal  Thought content normal    Very pleasant and cooperative   Vitals reviewed  Discussion with Family:  Updated wife    Discharge instructions/Information to patient and family:   See after visit summary for information provided to patient and family  Provisions for Follow-Up Care:  See after visit summary for information related to follow-up care and any pertinent home health orders  Disposition:     Home    For Discharges to [facility] SNF:   · Not Applicable to this Patient - Not Applicable to this Patient    Planned Readmission:      Discharge Statement:  I spent 35 minutes discharging the patient  This time was spent on the day of discharge  I had direct contact with the patient on the day of discharge  Greater than 50% of the total time was spent examining patient, answering all patient questions, arranging and discussing plan of care with patient as well as directly providing post-discharge instructions  Additional time then spent on discharge activities  Spoke with nursing and case management  Discharge Medications:  See after visit summary for reconciled discharge medications provided to patient and family        ** Please Note: This note has been constructed using a voice recognition system **

## 2019-07-10 NOTE — ASSESSMENT & PLAN NOTE
· Creatinine noted to be 3 0    Patient met with his nephrologist to discuss options regarding initiation of dialysis in the near future however not urgently

## 2019-07-10 NOTE — ASSESSMENT & PLAN NOTE
· Patient presented with episode of diaphoresis, shakiness, altered mental status (toxic metabolic encephalopathy) and facial asymmetry in the context of hypoglycemia with blood glucose 57  Wife witnessed the entire event and reported that symptoms immediately resolved rapidly with resolution of his hypoglycemia  Currently patient is back to baseline  · Patient is followed by Bettina Joy and wife reports that his blood sugars have been under very good control overall recently with his current regimen of 35 units of Lantus in the morning and 8 units t i d  with meals of short-acting NovoLog  However patient took his full dose of insulin the morning of the event and then did not eat much  · Patient was observed overnight in the hospital without any of his usual home insulin and blood sugars were noted to be quite good  Therefore at discharge we will need to drastically cut back on his regimen to prevent further episodes of hypoglycemia  I reviewed this directly with his wife  Will recommend Lantus 20 units and mealtime insulin 4 units    Also asked them to call endocrinology to get an appointment as soon as possible  · A1c noted to be 7 2

## 2019-07-11 ENCOUNTER — TELEPHONE (OUTPATIENT)
Dept: ENDOCRINOLOGY | Facility: CLINIC | Age: 59
End: 2019-07-11

## 2019-07-11 NOTE — TELEPHONE ENCOUNTER
Wife called pt was to have EGD Wednesday, Tuesday night pt was NPO and she gave him insulin and he was unable to do test and went to ER  She states she followed the directions she was given with EGD instructions  Advised her to call us for instructions concerning insulin  BS today 209  Test is rescheduled for 7/19 would like instructions for insulin  She also asked if you needed to see him

## 2019-07-11 NOTE — TELEPHONE ENCOUNTER
Please ask pt to send the recent log, we will provide instructions for insulin doses before EGD after reviewing the log  Thanks     Isidoro Gottron, MD

## 2019-07-12 ENCOUNTER — TELEPHONE (OUTPATIENT)
Dept: NEPHROLOGY | Facility: CLINIC | Age: 59
End: 2019-07-12

## 2019-07-12 NOTE — TELEPHONE ENCOUNTER
I left a message for patient to schedule a follow up appointment in 6-8 weeks with Dr Benton Ornelas, a transplant eval appointment with Dr Lucrecia Flores and let him know that we will mail out his labs for before his next appointment  We also need to let him know that we will call [facility] to set up a tour

## 2019-07-15 NOTE — TELEPHONE ENCOUNTER
The night before EGD he should take lantus 30 units at bedtime, hold Novolog in AM as he is NPO   He can restart his regular doses of novolog and lantus after EGD is done   He should check blood sugars three times daily , before each meal , before taking insulin ( novolog) looks like he is checking once daily sometimes     Brown Bah MD

## 2019-07-15 NOTE — TELEPHONE ENCOUNTER
Patient's wife made aware of changes due to having EGD--also made aware of the importance of checking 3x daily   Wife verbally understands

## 2019-07-25 NOTE — TELEPHONE ENCOUNTER
I called patient and spoke to his wife, she scheduled the September follow up with Dr Sadia Dow but stated she would like to wait and talk with Dr Sadia Dow before scheduling the pre transplant evaluation appointment  She also thinks PD contacted them regarding the tour

## 2019-07-29 ENCOUNTER — TRANSCRIBE ORDERS (OUTPATIENT)
Dept: LAB | Facility: CLINIC | Age: 59
End: 2019-07-29

## 2019-07-29 ENCOUNTER — APPOINTMENT (OUTPATIENT)
Dept: LAB | Facility: CLINIC | Age: 59
End: 2019-07-29
Payer: COMMERCIAL

## 2019-07-29 DIAGNOSIS — K74.60 HEPATIC CIRRHOSIS, UNSPECIFIED HEPATIC CIRRHOSIS TYPE, UNSPECIFIED WHETHER ASCITES PRESENT (HCC): ICD-10-CM

## 2019-07-29 DIAGNOSIS — K74.60 HEPATIC CIRRHOSIS, UNSPECIFIED HEPATIC CIRRHOSIS TYPE, UNSPECIFIED WHETHER ASCITES PRESENT (HCC): Primary | ICD-10-CM

## 2019-07-29 LAB
ALBUMIN SERPL BCP-MCNC: 3.2 G/DL (ref 3.5–5)
ALP SERPL-CCNC: 167 U/L (ref 46–116)
ALT SERPL W P-5'-P-CCNC: 32 U/L (ref 12–78)
ANION GAP SERPL CALCULATED.3IONS-SCNC: 12 MMOL/L (ref 4–13)
AST SERPL W P-5'-P-CCNC: 24 U/L (ref 5–45)
BASOPHILS # BLD AUTO: 0.03 THOUSANDS/ΜL (ref 0–0.1)
BASOPHILS NFR BLD AUTO: 0 % (ref 0–1)
BILIRUB SERPL-MCNC: 0.4 MG/DL (ref 0.2–1)
BUN SERPL-MCNC: 49 MG/DL (ref 5–25)
CALCIUM SERPL-MCNC: 9 MG/DL (ref 8.3–10.1)
CHLORIDE SERPL-SCNC: 107 MMOL/L (ref 100–108)
CO2 SERPL-SCNC: 21 MMOL/L (ref 21–32)
CREAT SERPL-MCNC: 3.08 MG/DL (ref 0.6–1.3)
EOSINOPHIL # BLD AUTO: 0.16 THOUSAND/ΜL (ref 0–0.61)
EOSINOPHIL NFR BLD AUTO: 2 % (ref 0–6)
ERYTHROCYTE [DISTWIDTH] IN BLOOD BY AUTOMATED COUNT: 13.8 % (ref 11.6–15.1)
GFR SERPL CREATININE-BSD FRML MDRD: 21 ML/MIN/1.73SQ M
GLUCOSE SERPL-MCNC: 218 MG/DL (ref 65–140)
HCT VFR BLD AUTO: 40.3 % (ref 36.5–49.3)
HGB BLD-MCNC: 12.4 G/DL (ref 12–17)
IMM GRANULOCYTES # BLD AUTO: 0.04 THOUSAND/UL (ref 0–0.2)
IMM GRANULOCYTES NFR BLD AUTO: 1 % (ref 0–2)
LYMPHOCYTES # BLD AUTO: 1.46 THOUSANDS/ΜL (ref 0.6–4.47)
LYMPHOCYTES NFR BLD AUTO: 21 % (ref 14–44)
MCH RBC QN AUTO: 27.4 PG (ref 26.8–34.3)
MCHC RBC AUTO-ENTMCNC: 30.8 G/DL (ref 31.4–37.4)
MCV RBC AUTO: 89 FL (ref 82–98)
MONOCYTES # BLD AUTO: 0.46 THOUSAND/ΜL (ref 0.17–1.22)
MONOCYTES NFR BLD AUTO: 7 % (ref 4–12)
NEUTROPHILS # BLD AUTO: 4.78 THOUSANDS/ΜL (ref 1.85–7.62)
NEUTS SEG NFR BLD AUTO: 69 % (ref 43–75)
NRBC BLD AUTO-RTO: 0 /100 WBCS
PLATELET # BLD AUTO: 187 THOUSANDS/UL (ref 149–390)
PMV BLD AUTO: 10.7 FL (ref 8.9–12.7)
POTASSIUM SERPL-SCNC: 4.8 MMOL/L (ref 3.5–5.3)
PROT SERPL-MCNC: 7.2 G/DL (ref 6.4–8.2)
RBC # BLD AUTO: 4.52 MILLION/UL (ref 3.88–5.62)
SODIUM SERPL-SCNC: 140 MMOL/L (ref 136–145)
WBC # BLD AUTO: 6.93 THOUSAND/UL (ref 4.31–10.16)

## 2019-07-29 PROCEDURE — 36415 COLL VENOUS BLD VENIPUNCTURE: CPT

## 2019-07-29 PROCEDURE — 80053 COMPREHEN METABOLIC PANEL: CPT

## 2019-07-29 PROCEDURE — 85025 COMPLETE CBC W/AUTO DIFF WBC: CPT

## 2019-07-30 ENCOUNTER — OFFICE VISIT (OUTPATIENT)
Dept: PODIATRY | Facility: CLINIC | Age: 59
End: 2019-07-30
Payer: COMMERCIAL

## 2019-07-30 VITALS
BODY MASS INDEX: 36.49 KG/M2 | HEART RATE: 72 BPM | DIASTOLIC BLOOD PRESSURE: 86 MMHG | WEIGHT: 240.8 LBS | SYSTOLIC BLOOD PRESSURE: 145 MMHG | HEIGHT: 68 IN

## 2019-07-30 DIAGNOSIS — E11.42 DIABETIC POLYNEUROPATHY ASSOCIATED WITH TYPE 2 DIABETES MELLITUS (HCC): Primary | ICD-10-CM

## 2019-07-30 DIAGNOSIS — S90.221A CONTUSION OF LESSER TOE OF RIGHT FOOT WITH DAMAGE TO NAIL, INITIAL ENCOUNTER: ICD-10-CM

## 2019-07-30 DIAGNOSIS — S81.801A UNSPECIFIED OPEN WOUND, RIGHT LOWER LEG, INITIAL ENCOUNTER: ICD-10-CM

## 2019-07-30 DIAGNOSIS — B35.1 ONYCHOMYCOSIS: ICD-10-CM

## 2019-07-30 PROCEDURE — 99213 OFFICE O/P EST LOW 20 MIN: CPT | Performed by: PODIATRIST

## 2019-07-30 PROCEDURE — 11721 DEBRIDE NAIL 6 OR MORE: CPT | Performed by: PODIATRIST

## 2019-08-13 DIAGNOSIS — F32.A ANXIETY AND DEPRESSION: ICD-10-CM

## 2019-08-13 DIAGNOSIS — F41.9 ANXIETY AND DEPRESSION: ICD-10-CM

## 2019-08-13 DIAGNOSIS — Z79.4 TYPE 2 DIABETES MELLITUS WITH HYPOGLYCEMIA WITHOUT COMA, WITH LONG-TERM CURRENT USE OF INSULIN (HCC): ICD-10-CM

## 2019-08-13 DIAGNOSIS — E11.649 TYPE 2 DIABETES MELLITUS WITH HYPOGLYCEMIA WITHOUT COMA, WITH LONG-TERM CURRENT USE OF INSULIN (HCC): ICD-10-CM

## 2019-08-13 RX ORDER — ESCITALOPRAM OXALATE 10 MG/1
10 TABLET ORAL
Qty: 90 TABLET | Refills: 0 | Status: SHIPPED | OUTPATIENT
Start: 2019-08-13 | End: 2020-03-01

## 2019-08-14 ENCOUNTER — TELEPHONE (OUTPATIENT)
Dept: INTERNAL MEDICINE CLINIC | Facility: CLINIC | Age: 59
End: 2019-08-14

## 2019-08-14 DIAGNOSIS — Z79.4 TYPE 2 DIABETES MELLITUS WITH HYPOGLYCEMIA WITHOUT COMA, WITH LONG-TERM CURRENT USE OF INSULIN (HCC): ICD-10-CM

## 2019-08-14 DIAGNOSIS — E11.65 TYPE 2 DIABETES MELLITUS WITH HYPERGLYCEMIA, UNSPECIFIED WHETHER LONG TERM INSULIN USE (HCC): Chronic | ICD-10-CM

## 2019-08-14 DIAGNOSIS — E11.649 TYPE 2 DIABETES MELLITUS WITH HYPOGLYCEMIA WITHOUT COMA, WITH LONG-TERM CURRENT USE OF INSULIN (HCC): ICD-10-CM

## 2019-08-14 NOTE — TELEPHONE ENCOUNTER
Spoke with patient wife and gave her all info on kidney specialist @ [facility], also faxed over patients face sheet & fax cover letting them know she would be calling to set up a second opinion          Patient said thank you very much & you're the best

## 2019-08-19 NOTE — ADDENDUM NOTE
Addended by: [facility] on: 2/6/2019 12:51 PM     Modules accepted: Orders
Handoff

## 2019-08-23 ENCOUNTER — APPOINTMENT (EMERGENCY)
Dept: RADIOLOGY | Facility: HOSPITAL | Age: 59
End: 2019-08-23
Payer: COMMERCIAL

## 2019-08-23 ENCOUNTER — HOSPITAL ENCOUNTER (EMERGENCY)
Facility: HOSPITAL | Age: 59
Discharge: HOME | End: 2019-08-23
Attending: EMERGENCY MEDICINE | Admitting: EMERGENCY MEDICINE
Payer: COMMERCIAL

## 2019-08-23 VITALS
OXYGEN SATURATION: 96 % | SYSTOLIC BLOOD PRESSURE: 156 MMHG | BODY MASS INDEX: 35.23 KG/M2 | RESPIRATION RATE: 16 BRPM | WEIGHT: 231.7 LBS | TEMPERATURE: 97.6 F | DIASTOLIC BLOOD PRESSURE: 81 MMHG | HEART RATE: 68 BPM

## 2019-08-23 DIAGNOSIS — R19.7 DIARRHEA, UNSPECIFIED TYPE: Primary | ICD-10-CM
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
## 2019-08-23 LAB
ALBUMIN SERPL BCP-MCNC: 3.1 G/DL (ref 3.5–5)
ALP SERPL-CCNC: 179 U/L (ref 46–116)
ALT SERPL W P-5'-P-CCNC: 20 U/L (ref 12–78)
ANION GAP SERPL CALCULATED.3IONS-SCNC: 7 MMOL/L (ref 4–13)
AST SERPL W P-5'-P-CCNC: 13 U/L (ref 5–45)
BASOPHILS # BLD AUTO: 0.02 THOUSANDS/ΜL (ref 0–0.1)
BASOPHILS NFR BLD AUTO: 0 % (ref 0–1)
BILIRUB SERPL-MCNC: 0.6 MG/DL (ref 0.2–1)
BUN SERPL-MCNC: 38 MG/DL (ref 5–25)
CALCIUM SERPL-MCNC: 8.7 MG/DL (ref 8.3–10.1)
CHLORIDE SERPL-SCNC: 107 MMOL/L (ref 100–108)
CO2 SERPL-SCNC: 25 MMOL/L (ref 21–32)
CREAT SERPL-MCNC: 3.07 MG/DL (ref 0.6–1.3)
EOSINOPHIL # BLD AUTO: 0.12 THOUSAND/ΜL (ref 0–0.61)
EOSINOPHIL NFR BLD AUTO: 2 % (ref 0–6)
ERYTHROCYTE [DISTWIDTH] IN BLOOD BY AUTOMATED COUNT: 13.4 % (ref 11.6–15.1)
GFR SERPL CREATININE-BSD FRML MDRD: 21 ML/MIN/1.73SQ M
GLUCOSE SERPL-MCNC: 112 MG/DL (ref 65–140)
HCT VFR BLD AUTO: 38.9 % (ref 36.5–49.3)
HGB BLD-MCNC: 12.4 G/DL (ref 12–17)
IMM GRANULOCYTES # BLD AUTO: 0.04 THOUSAND/UL (ref 0–0.2)
IMM GRANULOCYTES NFR BLD AUTO: 1 % (ref 0–2)
LYMPHOCYTES # BLD AUTO: 1.38 THOUSANDS/ΜL (ref 0.6–4.47)
LYMPHOCYTES NFR BLD AUTO: 19 % (ref 14–44)
MCH RBC QN AUTO: 28.4 PG (ref 26.8–34.3)
MCHC RBC AUTO-ENTMCNC: 31.9 G/DL (ref 31.4–37.4)
MCV RBC AUTO: 89 FL (ref 82–98)
MONOCYTES # BLD AUTO: 0.71 THOUSAND/ΜL (ref 0.17–1.22)
MONOCYTES NFR BLD AUTO: 10 % (ref 4–12)
NEUTROPHILS # BLD AUTO: 5.14 THOUSANDS/ΜL (ref 1.85–7.62)
NEUTS SEG NFR BLD AUTO: 68 % (ref 43–75)
NRBC BLD AUTO-RTO: 0 /100 WBCS
PLATELET # BLD AUTO: 163 THOUSANDS/UL (ref 149–390)
PMV BLD AUTO: 10.6 FL (ref 8.9–12.7)
POTASSIUM SERPL-SCNC: 4.5 MMOL/L (ref 3.5–5.3)
PROT SERPL-MCNC: 7.1 G/DL (ref 6.4–8.2)
RBC # BLD AUTO: 4.36 MILLION/UL (ref 3.88–5.62)
SODIUM SERPL-SCNC: 139 MMOL/L (ref 136–145)
WBC # BLD AUTO: 7.41 THOUSAND/UL (ref 4.31–10.16)

## 2019-08-23 PROCEDURE — 85025 COMPLETE CBC W/AUTO DIFF WBC: CPT | Performed by: EMERGENCY MEDICINE

## 2019-08-23 PROCEDURE — 80053 COMPREHEN METABOLIC PANEL: CPT | Performed by: EMERGENCY MEDICINE

## 2019-08-23 PROCEDURE — 36415 COLL VENOUS BLD VENIPUNCTURE: CPT | Performed by: EMERGENCY MEDICINE

## 2019-08-23 PROCEDURE — 99284 EMERGENCY DEPT VISIT MOD MDM: CPT

## 2019-08-23 PROCEDURE — 99283 EMERGENCY DEPT VISIT LOW MDM: CPT | Performed by: EMERGENCY MEDICINE

## 2019-08-23 PROCEDURE — 72170 X-RAY EXAM OF PELVIS: CPT

## 2019-08-23 PROCEDURE — 87505 NFCT AGENT DETECTION GI: CPT | Performed by: EMERGENCY MEDICINE

## 2019-08-23 PROCEDURE — 96361 HYDRATE IV INFUSION ADD-ON: CPT

## 2019-08-23 PROCEDURE — 96360 HYDRATION IV INFUSION INIT: CPT

## 2019-08-23 RX ORDER — SODIUM CHLORIDE, SODIUM LACTATE, POTASSIUM CHLORIDE, CALCIUM CHLORIDE 600; 310; 30; 20 MG/100ML; MG/100ML; MG/100ML; MG/100ML
250 INJECTION, SOLUTION INTRAVENOUS CONTINUOUS
Status: DISCONTINUED | OUTPATIENT
Start: 2019-08-23 | End: 2019-08-23 | Stop reason: HOSPADM

## 2019-08-23 RX ORDER — DIPHENOXYLATE HYDROCHLORIDE AND ATROPINE SULFATE 2.5; .025 MG/1; MG/1
1 TABLET ORAL 4 TIMES DAILY PRN
COMMUNITY
End: 2020-01-16

## 2019-08-23 RX ORDER — LIDOCAINE 50 MG/G
1 PATCH TOPICAL ONCE
Status: DISCONTINUED | OUTPATIENT
Start: 2019-08-23 | End: 2019-08-23 | Stop reason: HOSPADM

## 2019-08-23 RX ADMIN — SODIUM CHLORIDE, SODIUM LACTATE, POTASSIUM CHLORIDE, AND CALCIUM CHLORIDE 250 ML: .6; .31; .03; .02 INJECTION, SOLUTION INTRAVENOUS at 14:15

## 2019-08-23 RX ADMIN — LIDOCAINE 1 PATCH: 50 PATCH TOPICAL at 14:16

## 2019-08-23 NOTE — ED NOTES
Pt ambulated in room       [facility]  08/23/19 0241

## 2019-08-23 NOTE — ED NOTES
Pt aware to ring bell if he requires restroom, in event sample is needed       Peter Munoz, THIERNO  08/23/19 0117

## 2019-08-23 NOTE — DISCHARGE INSTRUCTIONS
DIAGNOSIS: FALL/  RIGHT BUTTOCK CONTUSION // DIARRHEA       - ACTIVITY/DIET AS TOLERATED     - THE LIDOCAINE PATCH NEEDS TO BE REMOVED IN 12 HRS- CAN NOT GET WARM OR WET     - IT WILL TAKE SEVERAL DAYS FOR THE STOOL TESTS TO COME BACK     - PLEASE CALL YOUR PRIMARY DOCTOR WHEN YOU GET HOME TO SCHEDULE AN APPOINTMENT FOR A RECHECK WITHIN 1 WEEK     - PLEASE RETURN TO  THE ER FOR ANY  FEVERS- TEMP > 100 4/ ANY NEW/ WORSENING ABDOMINAL PAIN/ ANY BLOODY STOOLS OR ANY NEW/ WORSENING/CONCERNING SYMPTOMS TO YOU

## 2019-08-24 ENCOUNTER — HOSPITAL ENCOUNTER (OUTPATIENT)
Facility: HOSPITAL | Age: 59
Setting detail: OBSERVATION
Discharge: HOME | End: 2019-08-25
Attending: EMERGENCY MEDICINE | Admitting: INTERNAL MEDICINE
Payer: COMMERCIAL

## 2019-08-24 ENCOUNTER — APPOINTMENT (EMERGENCY)
Dept: CT IMAGING | Facility: HOSPITAL | Age: 59
End: 2019-08-24
Payer: COMMERCIAL

## 2019-08-24 DIAGNOSIS — E11.649 TYPE 2 DIABETES MELLITUS WITH HYPOGLYCEMIA WITHOUT COMA, WITH LONG-TERM CURRENT USE OF INSULIN (HCC): ICD-10-CM

## 2019-08-24 DIAGNOSIS — T38.3X5A HYPOGLYCEMIA DUE TO INSULIN: Primary | ICD-10-CM

## 2019-08-24 DIAGNOSIS — E16.2 HYPOGLYCEMIA: ICD-10-CM

## 2019-08-24 DIAGNOSIS — R40.2432 GLASGOW COMA SCALE TOTAL SCORE 3-8, AT ARRIVAL TO EMERGENCY DEPARTMENT (HCC): ICD-10-CM

## 2019-08-24 DIAGNOSIS — T68.XXXA HYPOTHERMIA, INITIAL ENCOUNTER: ICD-10-CM

## 2019-08-24 DIAGNOSIS — E11.65 TYPE 2 DIABETES MELLITUS WITH HYPERGLYCEMIA, WITH LONG-TERM CURRENT USE OF INSULIN (HCC): ICD-10-CM

## 2019-08-24 DIAGNOSIS — E16.0 HYPOGLYCEMIA DUE TO INSULIN: Primary | ICD-10-CM

## 2019-08-24 DIAGNOSIS — Z79.4 TYPE 2 DIABETES MELLITUS WITH HYPERGLYCEMIA, WITH LONG-TERM CURRENT USE OF INSULIN (HCC): ICD-10-CM

## 2019-08-24 DIAGNOSIS — Z79.4 TYPE 2 DIABETES MELLITUS WITH HYPOGLYCEMIA WITHOUT COMA, WITH LONG-TERM CURRENT USE OF INSULIN (HCC): ICD-10-CM

## 2019-08-24 LAB
ALBUMIN SERPL BCP-MCNC: 3.5 G/DL (ref 3.5–5)
ALP SERPL-CCNC: 199 U/L (ref 46–116)
ALT SERPL W P-5'-P-CCNC: 16 U/L (ref 12–78)
AMMONIA PLAS-SCNC: 12 UMOL/L (ref 11–35)
ANION GAP SERPL CALCULATED.3IONS-SCNC: 11 MMOL/L (ref 4–13)
APTT PPP: 33 SECONDS (ref 23–37)
AST SERPL W P-5'-P-CCNC: 17 U/L (ref 5–45)
BASE EX.OXY STD BLDV CALC-SCNC: 89.9 % (ref 60–80)
BASE EXCESS BLDV CALC-SCNC: -6.9 MMOL/L
BASOPHILS # BLD AUTO: 0.04 THOUSANDS/ΜL (ref 0–0.1)
BASOPHILS NFR BLD AUTO: 0 % (ref 0–1)
BILIRUB SERPL-MCNC: 0.5 MG/DL (ref 0.2–1)
BUN SERPL-MCNC: 36 MG/DL (ref 5–25)
C DIFF TOX GENS STL QL NAA+PROBE: NORMAL
CALCIUM SERPL-MCNC: 9.2 MG/DL (ref 8.3–10.1)
CAMPYLOBACTER DNA SPEC NAA+PROBE: NORMAL
CHLORIDE SERPL-SCNC: 105 MMOL/L (ref 100–108)
CO2 SERPL-SCNC: 24 MMOL/L (ref 21–32)
CREAT SERPL-MCNC: 2.95 MG/DL (ref 0.6–1.3)
EOSINOPHIL # BLD AUTO: 0.16 THOUSAND/ΜL (ref 0–0.61)
EOSINOPHIL NFR BLD AUTO: 2 % (ref 0–6)
ERYTHROCYTE [DISTWIDTH] IN BLOOD BY AUTOMATED COUNT: 13.4 % (ref 11.6–15.1)
GFR SERPL CREATININE-BSD FRML MDRD: 22 ML/MIN/1.73SQ M
GLUCOSE SERPL-MCNC: 109 MG/DL (ref 65–140)
GLUCOSE SERPL-MCNC: 167 MG/DL (ref 65–140)
GLUCOSE SERPL-MCNC: 178 MG/DL (ref 65–140)
GLUCOSE SERPL-MCNC: 184 MG/DL (ref 65–140)
GLUCOSE SERPL-MCNC: 240 MG/DL (ref 65–140)
GLUCOSE SERPL-MCNC: 47 MG/DL (ref 65–140)
GLUCOSE SERPL-MCNC: 76 MG/DL (ref 65–140)
HCO3 BLDV-SCNC: 19.2 MMOL/L (ref 24–30)
HCT VFR BLD AUTO: 42.3 % (ref 36.5–49.3)
HGB BLD-MCNC: 13.7 G/DL (ref 12–17)
IMM GRANULOCYTES # BLD AUTO: 0.06 THOUSAND/UL (ref 0–0.2)
IMM GRANULOCYTES NFR BLD AUTO: 1 % (ref 0–2)
INR PPP: 1.14 (ref 0.84–1.19)
LYMPHOCYTES # BLD AUTO: 1.8 THOUSANDS/ΜL (ref 0.6–4.47)
LYMPHOCYTES NFR BLD AUTO: 18 % (ref 14–44)
MCH RBC QN AUTO: 28.4 PG (ref 26.8–34.3)
MCHC RBC AUTO-ENTMCNC: 32.4 G/DL (ref 31.4–37.4)
MCV RBC AUTO: 88 FL (ref 82–98)
MONOCYTES # BLD AUTO: 0.93 THOUSAND/ΜL (ref 0.17–1.22)
MONOCYTES NFR BLD AUTO: 10 % (ref 4–12)
NEUTROPHILS # BLD AUTO: 6.8 THOUSANDS/ΜL (ref 1.85–7.62)
NEUTS SEG NFR BLD AUTO: 69 % (ref 43–75)
NRBC BLD AUTO-RTO: 0 /100 WBCS
O2 CT BLDV-SCNC: 15.7 ML/DL
PCO2 BLDV: 40.8 MM HG (ref 42–50)
PH BLDV: 7.29 [PH] (ref 7.3–7.4)
PLATELET # BLD AUTO: 185 THOUSANDS/UL (ref 149–390)
PMV BLD AUTO: 10.6 FL (ref 8.9–12.7)
PO2 BLDV: 68.7 MM HG (ref 35–45)
POTASSIUM SERPL-SCNC: 3.9 MMOL/L (ref 3.5–5.3)
PROT SERPL-MCNC: 8 G/DL (ref 6.4–8.2)
PROTHROMBIN TIME: 14 SECONDS (ref 11.6–14.5)
RBC # BLD AUTO: 4.83 MILLION/UL (ref 3.88–5.62)
SALMONELLA DNA SPEC QL NAA+PROBE: NORMAL
SHIGA TOXIN STX GENE SPEC NAA+PROBE: NORMAL
SHIGELLA DNA SPEC QL NAA+PROBE: NORMAL
SODIUM SERPL-SCNC: 140 MMOL/L (ref 136–145)
WBC # BLD AUTO: 9.79 THOUSAND/UL (ref 4.31–10.16)

## 2019-08-24 PROCEDURE — 70450 CT HEAD/BRAIN W/O DYE: CPT

## 2019-08-24 PROCEDURE — 85025 COMPLETE CBC W/AUTO DIFF WBC: CPT | Performed by: EMERGENCY MEDICINE

## 2019-08-24 PROCEDURE — 36415 COLL VENOUS BLD VENIPUNCTURE: CPT | Performed by: EMERGENCY MEDICINE

## 2019-08-24 PROCEDURE — 96376 TX/PRO/DX INJ SAME DRUG ADON: CPT

## 2019-08-24 PROCEDURE — 82805 BLOOD GASES W/O2 SATURATION: CPT | Performed by: EMERGENCY MEDICINE

## 2019-08-24 PROCEDURE — 99220 PR INITIAL OBSERVATION CARE/DAY 70 MINUTES: CPT | Performed by: INTERNAL MEDICINE

## 2019-08-24 PROCEDURE — 82140 ASSAY OF AMMONIA: CPT | Performed by: EMERGENCY MEDICINE

## 2019-08-24 PROCEDURE — 80053 COMPREHEN METABOLIC PANEL: CPT | Performed by: EMERGENCY MEDICINE

## 2019-08-24 PROCEDURE — 82948 REAGENT STRIP/BLOOD GLUCOSE: CPT

## 2019-08-24 PROCEDURE — 93005 ELECTROCARDIOGRAM TRACING: CPT

## 2019-08-24 PROCEDURE — 85730 THROMBOPLASTIN TIME PARTIAL: CPT | Performed by: EMERGENCY MEDICINE

## 2019-08-24 PROCEDURE — 96374 THER/PROPH/DIAG INJ IV PUSH: CPT

## 2019-08-24 PROCEDURE — 99291 CRITICAL CARE FIRST HOUR: CPT

## 2019-08-24 PROCEDURE — 96361 HYDRATE IV INFUSION ADD-ON: CPT

## 2019-08-24 PROCEDURE — 99291 CRITICAL CARE FIRST HOUR: CPT | Performed by: EMERGENCY MEDICINE

## 2019-08-24 PROCEDURE — 85610 PROTHROMBIN TIME: CPT | Performed by: EMERGENCY MEDICINE

## 2019-08-24 RX ORDER — DIPHENOXYLATE HYDROCHLORIDE AND ATROPINE SULFATE 2.5; .025 MG/1; MG/1
1 TABLET ORAL 4 TIMES DAILY PRN
Status: DISCONTINUED | OUTPATIENT
Start: 2019-08-24 | End: 2019-08-25 | Stop reason: HOSPADM

## 2019-08-24 RX ORDER — ATORVASTATIN CALCIUM 40 MG/1
80 TABLET, FILM COATED ORAL
Status: DISCONTINUED | OUTPATIENT
Start: 2019-08-24 | End: 2019-08-25 | Stop reason: HOSPADM

## 2019-08-24 RX ORDER — DEXTROSE MONOHYDRATE 50 MG/ML
50 INJECTION, SOLUTION INTRAVENOUS CONTINUOUS
Status: DISCONTINUED | OUTPATIENT
Start: 2019-08-24 | End: 2019-08-24

## 2019-08-24 RX ORDER — ESCITALOPRAM OXALATE 10 MG/1
10 TABLET ORAL
Status: DISCONTINUED | OUTPATIENT
Start: 2019-08-24 | End: 2019-08-25 | Stop reason: HOSPADM

## 2019-08-24 RX ORDER — DOCUSATE SODIUM 100 MG/1
100 CAPSULE, LIQUID FILLED ORAL 2 TIMES DAILY PRN
Status: DISCONTINUED | OUTPATIENT
Start: 2019-08-24 | End: 2019-08-25 | Stop reason: HOSPADM

## 2019-08-24 RX ORDER — DEXTROSE MONOHYDRATE 25 G/50ML
50 INJECTION, SOLUTION INTRAVENOUS ONCE
Status: COMPLETED | OUTPATIENT
Start: 2019-08-24 | End: 2019-08-24

## 2019-08-24 RX ORDER — DEXTROSE MONOHYDRATE 25 G/50ML
INJECTION, SOLUTION INTRAVENOUS
Status: COMPLETED
Start: 2019-08-24 | End: 2019-08-24

## 2019-08-24 RX ORDER — ONDANSETRON 2 MG/ML
4 INJECTION INTRAMUSCULAR; INTRAVENOUS EVERY 6 HOURS PRN
Status: DISCONTINUED | OUTPATIENT
Start: 2019-08-24 | End: 2019-08-25 | Stop reason: HOSPADM

## 2019-08-24 RX ORDER — LABETALOL 20 MG/4 ML (5 MG/ML) INTRAVENOUS SYRINGE
10 EVERY 4 HOURS PRN
Status: DISCONTINUED | OUTPATIENT
Start: 2019-08-24 | End: 2019-08-25 | Stop reason: HOSPADM

## 2019-08-24 RX ORDER — ASPIRIN 81 MG/1
81 TABLET ORAL DAILY
Status: DISCONTINUED | OUTPATIENT
Start: 2019-08-24 | End: 2019-08-25 | Stop reason: HOSPADM

## 2019-08-24 RX ORDER — DEXTROSE MONOHYDRATE 50 MG/ML
50 INJECTION, SOLUTION INTRAVENOUS CONTINUOUS
Status: DISPENSED | OUTPATIENT
Start: 2019-08-24 | End: 2019-08-24

## 2019-08-24 RX ORDER — DEXTROSE AND SODIUM CHLORIDE 5; .45 G/100ML; G/100ML
75 INJECTION, SOLUTION INTRAVENOUS CONTINUOUS
Status: DISCONTINUED | OUTPATIENT
Start: 2019-08-24 | End: 2019-08-24

## 2019-08-24 RX ORDER — CALCIUM CARBONATE 200(500)MG
1000 TABLET,CHEWABLE ORAL DAILY PRN
Status: DISCONTINUED | OUTPATIENT
Start: 2019-08-24 | End: 2019-08-25 | Stop reason: HOSPADM

## 2019-08-24 RX ORDER — CARVEDILOL 6.25 MG/1
6.25 TABLET ORAL 2 TIMES DAILY WITH MEALS
Status: DISCONTINUED | OUTPATIENT
Start: 2019-08-24 | End: 2019-08-25 | Stop reason: HOSPADM

## 2019-08-24 RX ORDER — GABAPENTIN 250 MG/5ML
600 SOLUTION ORAL
Status: DISCONTINUED | OUTPATIENT
Start: 2019-08-24 | End: 2019-08-25 | Stop reason: HOSPADM

## 2019-08-24 RX ORDER — AMLODIPINE BESYLATE 5 MG/1
5 TABLET ORAL EVERY 12 HOURS
Status: DISCONTINUED | OUTPATIENT
Start: 2019-08-24 | End: 2019-08-25 | Stop reason: HOSPADM

## 2019-08-24 RX ORDER — ACETAMINOPHEN 325 MG/1
650 TABLET ORAL EVERY 6 HOURS PRN
Status: DISCONTINUED | OUTPATIENT
Start: 2019-08-24 | End: 2019-08-25 | Stop reason: HOSPADM

## 2019-08-24 RX ORDER — FAMOTIDINE 20 MG/1
20 TABLET, FILM COATED ORAL DAILY
Status: DISCONTINUED | OUTPATIENT
Start: 2019-08-24 | End: 2019-08-25 | Stop reason: HOSPADM

## 2019-08-24 RX ADMIN — AMLODIPINE BESYLATE 5 MG: 5 TABLET ORAL at 14:46

## 2019-08-24 RX ADMIN — ACETAMINOPHEN 650 MG: 325 TABLET ORAL at 21:25

## 2019-08-24 RX ADMIN — ASPIRIN 81 MG: 81 TABLET, COATED ORAL at 14:46

## 2019-08-24 RX ADMIN — DEXTROSE MONOHYDRATE 50 ML: 25 INJECTION, SOLUTION INTRAVENOUS at 09:31

## 2019-08-24 RX ADMIN — INSULIN LISPRO 2 UNITS: 100 INJECTION, SOLUTION INTRAVENOUS; SUBCUTANEOUS at 17:36

## 2019-08-24 RX ADMIN — ATORVASTATIN CALCIUM 80 MG: 40 TABLET, FILM COATED ORAL at 17:34

## 2019-08-24 RX ADMIN — FAMOTIDINE 20 MG: 20 TABLET ORAL at 14:46

## 2019-08-24 RX ADMIN — DEXTROSE 50 ML/HR: 5 SOLUTION INTRAVENOUS at 14:50

## 2019-08-24 RX ADMIN — SODIUM CHLORIDE 1000 ML: 0.9 INJECTION, SOLUTION INTRAVENOUS at 09:49

## 2019-08-24 RX ADMIN — GABAPENTIN 600 MG: 250 SUSPENSION ORAL at 21:29

## 2019-08-24 RX ADMIN — INSULIN LISPRO 1 UNITS: 100 INJECTION, SOLUTION INTRAVENOUS; SUBCUTANEOUS at 21:26

## 2019-08-24 RX ADMIN — ESCITALOPRAM OXALATE 10 MG: 10 TABLET ORAL at 21:25

## 2019-08-24 RX ADMIN — DEXTROSE 50 % IN WATER (D50W) INTRAVENOUS SYRINGE 50 ML: at 09:31

## 2019-08-24 RX ADMIN — DEXTROSE AND SODIUM CHLORIDE 75 ML/HR: 5; .45 INJECTION, SOLUTION INTRAVENOUS at 11:41

## 2019-08-24 RX ADMIN — DEXTROSE 50 % IN WATER (D50W) INTRAVENOUS SYRINGE 50 ML: at 10:24

## 2019-08-24 RX ADMIN — CARVEDILOL 6.25 MG: 6.25 TABLET, FILM COATED ORAL at 17:31

## 2019-08-24 NOTE — ASSESSMENT & PLAN NOTE
· Patient presented unresponsive; blood glucose noted to be at 40  Received Lantus 35 units and NovoLog 8 units a m   After breakfast  · Closely monitor blood sugars  · Continue D5 for another 2 hours  · Sliding scale insulin  · Hold long-acting insulin for today

## 2019-08-24 NOTE — PLAN OF CARE
Problem: SAFETY ADULT  Goal: Patient will remain free of falls  Description  INTERVENTIONS:  - Assess patient frequently for physical needs  -  Identify cognitive and physical deficits and behaviors that affect risk of falls    -  [place] fall precautions as indicated by assessment   - Educate patient/family on patient safety including physical limitations  - Instruct patient to call for assistance with activity based on assessment  - Modify environment to reduce risk of injury  - Consider OT/PT consult to assist with strengthening/mobility  Outcome: Progressing  Goal: Maintain or return to baseline ADL function  Description  INTERVENTIONS:  -  Assess patient's ability to carry out ADLs; assess patient's baseline for ADL function and identify physical deficits which impact ability to perform ADLs (bathing, care of mouth/teeth, toileting, grooming, dressing, etc )  - Assess/evaluate cause of self-care deficits   - Assess range of motion  - Assess patient's mobility; develop plan if impaired  - Assess patient's need for assistive devices and provide as appropriate  - Encourage maximum independence but intervene and supervise when necessary  - Involve family in performance of ADLs  - Assess for home care needs following discharge   - Consider OT consult to assist with ADL evaluation and planning for discharge  - Provide patient education as appropriate  Outcome: Progressing  Goal: Maintain or return mobility status to optimal level  Description  INTERVENTIONS:  - Assess patient's baseline mobility status (ambulation, transfers, stairs, etc )    - Identify cognitive and physical deficits and behaviors that affect mobility  - Identify mobility aids required to assist with transfers and/or ambulation (gait belt, sit-to-stand, lift, walker, cane, etc )  - [place] fall precautions as indicated by assessment  - Record patient progress and toleration of activity level on Mobility SBAR; progress patient to next Phase/Stage  - Instruct patient to call for assistance with activity based on assessment  - Consider rehabilitation consult to assist with strengthening/weightbearing, etc   Outcome: Progressing     Problem: DISCHARGE PLANNING  Goal: Discharge to home or other facility with appropriate resources  Description  INTERVENTIONS:  - Identify barriers to discharge w/patient and caregiver  - Arrange for needed discharge resources and transportation as appropriate  - Identify discharge learning needs (meds, wound care, etc )  - Arrange for interpretive services to assist at discharge as needed  - Refer to Case Management Department for coordinating discharge planning if the patient needs post-hospital services based on physician/advanced practitioner order or complex needs related to functional status, cognitive ability, or social support system  Outcome: Progressing     Problem: Knowledge Deficit  Goal: Patient/family/caregiver demonstrates understanding of disease process, treatment plan, medications, and discharge instructions  Description  Complete learning assessment and assess knowledge base    Interventions:  - Provide teaching at level of understanding  - Provide teaching via preferred learning methods  Outcome: Progressing     Problem: METABOLIC, FLUID AND ELECTROLYTES - ADULT  Goal: Electrolytes maintained within normal limits  Description  INTERVENTIONS:  - Monitor labs and assess patient for signs and symptoms of electrolyte imbalances  - Administer electrolyte replacement as ordered  - Monitor response to electrolyte replacements, including repeat lab results as appropriate  - Instruct patient on fluid and nutrition as appropriate  Outcome: Progressing  Goal: Fluid balance maintained  Description  INTERVENTIONS:  - Monitor labs   - Monitor I/O and WT  - Instruct patient on fluid and nutrition as appropriate  - Assess for signs & symptoms of volume excess or deficit  Outcome: Progressing  Goal: Glucose maintained within target range  Description  INTERVENTIONS:  - Monitor Blood Glucose as ordered  - Assess for signs and symptoms of hyperglycemia and hypoglycemia  - Administer ordered medications to maintain glucose within target range  - Assess nutritional intake and initiate nutrition service referral as needed  Outcome: Progressing     Problem: HEMATOLOGIC - ADULT  Goal: Maintains hematologic stability  Description  INTERVENTIONS  - Assess for signs and symptoms of bleeding or hemorrhage  - Monitor labs  - Administer supportive blood products/factors as ordered and appropriate  Outcome: Progressing

## 2019-08-24 NOTE — ASSESSMENT & PLAN NOTE
Lab Results   Component Value Date    HGBA1C 7 2 (H) 07/10/2019       Recent Labs     08/24/19  0939 08/24/19  1022 08/24/19  1137   POCGLU 184* 76 109       Blood Sugar Average: Last 72 hrs:  (P) 123   A1c 7 2  Closely monitor blood sugars  Continue D5 for now  Likely resume lower dose of Lantus tomorrow  Sliding scale insulin for now

## 2019-08-24 NOTE — H&P
H&P- Priscilla Potts 1960, 61 y o  male MRN: [id number]    Unit/Bed#: -01 Encounter: [id number]    Primary Care Provider: Geeta Jones DO   Date and time admitted to hospital: 8/24/2019  9:30 AM     symptomatic hypoglycemia  Assessment & Plan  · Patient presented unresponsive; blood glucose noted to be at 40  Received Lantus 35 units and NovoLog 8 units a m  After breakfast  · Closely monitor blood sugars  · Continue D5 for another 2 hours  · Sliding scale insulin  · Hold long-acting insulin for today    Hypothermia  Assessment & Plan  · Due to hypoglycemia  · Currently temperature improved  · Continue to monitor    Stage 4 chronic kidney disease ([facility] )  Assessment & Plan  · Creatinine close to baseline  · Continue to monitor  · Follow outpatient with Nephrology    Type 2 diabetes mellitus with hyperglycemia, with long-term current use of insulin [facility])  Assessment & Plan  Lab Results   Component Value Date    HGBA1C 7 2 (H) 07/10/2019       Recent Labs     08/24/19  0939 08/24/19  1022 08/24/19  1137   POCGLU 184* 76 109       Blood Sugar Average: Last 72 hrs:  (P) 123   A1c 7 2  Closely monitor blood sugars  Continue D5 for now  Likely resume lower dose of Lantus tomorrow  Sliding scale insulin for now    Benign hypertension with CKD (chronic kidney disease) stage IV (HCC)  Assessment & Plan  · Continue Coreg and amlodipine  · Monitor blood    History of stroke  Assessment & Plan  · Supportive care and aspirin    Mixed hyperlipidemia  Assessment & Plan  · Continue statin    VTE Prophylaxis: Heparin  / sequential compression device   Code Status:  Full code  POLST: There is no POLST form on file for this patient (pre-hospital)  Discussion with family:  Wife at bedside updated    Anticipated Length of Stay:  Patient will be admitted on an Observation basis with an anticipated length of stay of  < 2 midnights     Justification for Hospital Stay:  Hypoglycemia    Total Time for Visit, including Counseling / Coordination of Care: 1 hour  Greater than 50% of this total time spent on direct patient counseling and coordination of care  Chief Complaint:  Hypoglycemia    History of Present Illness:    Adriano Dominguez is a 61 y o  male with past medical significant for chronic kidney disease, diabetes mellitus, diarrhea, history of stroke who presents with unresponsive episode  Patient was going to flea market in his car with his family, he was not driving  Family noted him to be very sweaty and pale, and became unresponsive  Patient was brought to the emergency department and noted to have blood glucose of 40  He was given IV dextrose  There was delay in improving his mentation back to baseline, hence critical care was called  Patient was recommended overnight observation, currently returned to baseline  Patient has been having diarrhea for last 3-4 days, has not taken any of insulin last few days  Today is the 1st day he did not have any diarrhea, he ate to eggs and toast, then took 35 units of Lantus and 8 units of NovoLog  No new medication    Review of Systems:    Review of Systems  Twelve point review systems negative except noted above  Past Medical and Surgical History:     Past Medical History:   Diagnosis Date    Diabetes mellitus ([facility] )     Hypercholesteremia     Hyperlipidemia     Hypertension     Neuropathy     Obesity     Osteomyelitis ([facility] )     last assessed 11/4/16    PVC's (premature ventricular contractions)     sees cardiology Dr Deon Aponte camargo    Stroke [facility])     last weeof July 2018 [address] [place] [address]       Past Surgical History:   Procedure Laterality Date    ABDOMINAL SURGERY      CHOLECYSTECTOMY      Percutaneous    OTHER SURGICAL HISTORY      "stimulator to control bowel movements"    AR ESOPHAGOGASTRODUODENOSCOPY TRANSORAL DIAGNOSTIC N/A 9/27/2016    Procedure: ESOPHAGOGASTRODUODENOSCOPY (EGD); Surgeon: Brian Woods MD;  [facility];   Service: Gastroenterology    VT LAP,CHOLECYSTECTOMY N/A 2/29/2016    Procedure: LAPAROSCOPIC CHOLECYSTECTOMY ;  Surgeon: Shon Martinez DO;  [facility];  Service: General    ROTATOR CUFF REPAIR Right     TOE AMPUTATION Right 10/28/2016    Procedure: 3RD TOE AMPUTATION ;  Surgeon: Aram Merchant DPM;  [facility];  Service:        Meds/Allergies:    Prior to Admission medications    Medication Sig Start Date End Date Taking? Authorizing Provider   amLODIPine (NORVASC) 5 mg tablet Take 1 tablet (5 mg total) by mouth every 12 (twelve) hours 6/10/19   Raj Auguste DO   aspirin (ECOTRIN LOW STRENGTH) 81 mg EC tablet Take 81 mg by mouth daily Resume on 8/14    Historical Provider, MD   atorvastatin (LIPITOR) 80 mg tablet Take 1 tablet (80 mg total) by mouth daily with dinner 6/10/19   Raj Magaña DO   Blood Glucose Monitoring Suppl (ONE TOUCH ULTRA MINI) w/Device KIT by Does not apply route 3 (three) times a day 11/27/18   Raj Auguste DO   Blood Pressure Monitoring (BLOOD PRESSURE CUFF) MISC Use to check blood pressure before taking blood pressure medication and 1 hour after and follow instructions provided in discharge instructions based on the readings   8/13/18   Johnathan Foote MD   carvedilol (COREG) 6 25 mg tablet Take 1 tablet (6 25 mg total) by mouth 2 (two) times a day with meals 7/10/19   Raj Auguste DO   Cholecalciferol (VITAMIN D3) 64009 units CAPS TAKE 1 TABLET BY MOUTH ONCE A WEEK 3/18/19   BRTITANY Delatorre   [facility] VITAMIN B-12 1000 MCG tablet TAKE 1 TABLET BY MOUTH EVERY DAY 3/27/19   Ashley Camacho MD   diphenoxylate-atropine (LOMOTIL) 2 5-0 025 mg per tablet Take 1 tablet by mouth 4 (four) times a day as needed for diarrhea    Historical Provider, MD   escitalopram (LEXAPRO) 10 mg tablet Take 1 tablet (10 mg total) by mouth daily at bedtime 8/13/19   Rja Auguste DO   famotidine (PEPCID) 20 mg tablet Take 20 mg by mouth daily Resume on 8/14    Historical Provider, MD   gabapentin (NEURONTIN) 250 mg/5 mL solution Take 12 mL (600 mg total) by mouth daily at bedtime 3/7/19   Bob Garcia DO   glucose 4 g chewable tablet Chew 3 tablets (12 g total) as needed for low blood sugar 8/15/18   Raj Auguste DO   glucose blood (ONE TOUCH ULTRA TEST) test strip 1 each by Other route 3 (three) times a day Use as instructed 1/29/19   Raj Auguste DO   Incontinence Supplies (MALE URINAL) MISC by Does not apply route daily 9/24/18   Raj Auguste DO   insulin aspart (NovoLOG) 100 units/mL injection Inject 4 Units under the skin 3 (three) times a day before meals & Scale:150-200 -2 units, 201-250 -4 units, 251-300 -6 units, 301-350 -8 units, >350 -10 units  Patient taking differently: Inject 8 Units under the skin 3 (three) times a day before meals & Scale:150-200 -2 units, 201-250 -4 units, 251-300 -6 units, 301-350 -8 units, >350 -10 units 8/14/19   Coral Nowak MD   insulin glargine (LANTUS) 100 units/mL subcutaneous injection Inject 20 Units under the skin daily  Patient taking differently: Inject 35 Units under the skin daily  7/10/19   Marine Miller PA-C   Insulin Syringe-Needle U-100 (B-D INS SYR ULTRAFINE  3CC/30G) 30G X 1/2" 0 3 ML MISC by Does not apply route 4 (four) times a day 10/26/18   Raj Auguste DO   Insulin Syringe-Needle U-100 (B-D INS SYRINGE 0 5CC/30GX1/2") 30G X 1/2" 0 5 ML MISC Inject under the skin 4 (four) times a day 8/14/19   Raj Auguste DO   LUCENTIS 0 3 MG/0 05ML  9/11/18   Historical Provider, MD Liu Katie LANCETS 31E MISC by Does not apply route 3 (three) times a day 11/27/18   Raj Auguste DO   SBI/Protein Isolate (ENTERAGAM) 5 g PACK Take by mouth    Historical Provider, MD     I have reviewed home medications with patient personally      Allergies: No Known Allergies    Social History:     Marital Status: /Civil Union   Occupation:  Retired  Patient Pre-hospital Living Situation:  Lives with wife  Patient Pre-hospital Level of Mobility:  Independent  Patient Pre-hospital Diet Restrictions:  None  Substance Use History:   Social History     Substance and Sexual Activity   Alcohol Use No     Social History     Tobacco Use   Smoking Status Never Smoker   Smokeless Tobacco Never Used     Social History     Substance and Sexual Activity   Drug Use No       Family History:    non-contributory    Physical Exam:     Vitals:   Blood Pressure: (!) 176/81 (08/24/19 1404)  Pulse: 69 (08/24/19 1353)  Temperature: 98 1 °F (36 7 °C) (08/24/19 1353)  Temp Source: Oral (08/24/19 1353)  Respirations: 16 (08/24/19 1353)  Height: 5' 10" (177 8 cm) (08/24/19 1353)  Weight - Scale: 104 kg (229 lb 3 2 oz) (08/24/19 1353)  SpO2: 99 % (08/24/19 1353)    Physical Exam     Gen -Patient comfortable at rest  Neck- Supple  No thyromegaly or lymphadenopathy  Lungs-Clear bilaterally without any wheeze or rales   Heart S1-S2, regular rate and rhythm, no murmurs  Abdomen-soft nontender, no organomegaly  Bowel sounds present  Extremities-no cyanosi,  clubbing or edema  Skin- no rash  Neuro-nonfocal    Additional Data:     Lab Results: I have personally reviewed pertinent reports  Results from last 7 days   Lab Units 08/24/19  0948   WBC Thousand/uL 9 79   HEMOGLOBIN g/dL 13 7   HEMATOCRIT % 42 3   PLATELETS Thousands/uL 185   NEUTROS PCT % 69   LYMPHS PCT % 18   MONOS PCT % 10   EOS PCT % 2     Results from last 7 days   Lab Units 08/24/19  0948   SODIUM mmol/L 140   POTASSIUM mmol/L 3 9   CHLORIDE mmol/L 105   CO2 mmol/L 24   BUN mg/dL 36*   CREATININE mg/dL 2 95*   ANION GAP mmol/L 11   CALCIUM mg/dL 9 2   ALBUMIN g/dL 3 5   TOTAL BILIRUBIN mg/dL 0 50   ALK PHOS U/L 199*   ALT U/L 16   AST U/L 17   GLUCOSE RANDOM mg/dL 47*     Results from last 7 days   Lab Units 08/24/19  1038   INR  1 14     Results from last 7 days   Lab Units 08/24/19  1137 08/24/19  1022 08/24/19  0939   POC GLUCOSE mg/dl 109 76 184*               Imaging: I have personally reviewed pertinent reports        CT head without contrast   Final Result by Emely Zarate DO (08/24 1031)   Moderate cerebral atrophy with chronic small vessel ischemic change  No acute intracranial abnormality  No significant change from priors  Workstation performed: [id number]             EKG, Pathology, and Other Studies Reviewed on Admission:   · EKG:  Normal sinus rhythm    Allscripts / Epic Records Reviewed: Yes     ** Please Note: This note has been constructed using a voice recognition system   **

## 2019-08-24 NOTE — ED PROVIDER NOTES
History  Chief Complaint   Patient presents with    Hypoglycemia - Symptomatic     family drove pt in  they were heading to the car show and pt became unresponsive  upon arrival, pt was unresponsive and diaphoretic  had to be lifted out of the car  fsg on arrival was 40  pt ate today per wife       History provided by:  Patient  Hypoglycemia - Symptomatic   Initial blood sugar:  40  Blood sugar after intervention:  None  Severity:  Severe  Onset quality:  Sudden  Duration: 15 minutes  Timing:  Constant  Progression:  Unchanged  Chronicity:  Recurrent  Diabetic status:  Controlled with insulin  Context: decreased oral intake    Relieved by:  None tried  Ineffective treatments:  None tried  Associated symptoms: altered mental status, sweats and vomiting    Associated symptoms: no dizziness, no shortness of breath and no speech difficulty        Prior to Admission Medications   Prescriptions Last Dose Informant Patient Reported? Taking? Blood Glucose Monitoring Suppl (ONE TOUCH ULTRA MINI) w/Device KIT  Spouse/Significant Other No No   Sig: by Does not apply route 3 (three) times a day   Blood Pressure Monitoring (BLOOD PRESSURE CUFF) MISC  Spouse/Significant Other No No   Sig: Use to check blood pressure before taking blood pressure medication and 1 hour after and follow instructions provided in discharge instructions based on the readings     [employer] VITAMIN B-12 1000 MCG tablet  Spouse/Significant Other No No   Sig: TAKE 1 TABLET BY MOUTH EVERY DAY   Cholecalciferol (VITAMIN D3) 87735 units CAPS  Spouse/Significant Other No No   Sig: TAKE 1 TABLET BY MOUTH ONCE A WEEK   Incontinence Supplies (MALE URINAL) MISC  Spouse/Significant Other No No   Sig: by Does not apply route daily   Insulin Syringe-Needle U-100 (B-D INS SYR ULTRAFINE  3CC/30G) 30G X 1/2" 0 3 ML MISC  Spouse/Significant Other No No   Sig: by Does not apply route 4 (four) times a day   Insulin Syringe-Needle U-100 (B-D INS SYRINGE 0 5CC/30GX1/2") 30G X 1/2" 0 5 ML MISC   No No   Sig: Inject under the skin 4 (four) times a day   LUCENTIS 0 3 MG/0 05ML Unknown at Unknown time Spouse/Significant Other Yes No   ONETOUCH DELICA LANCETS 16D MISC  Spouse/Significant Other No No   Sig: by Does not apply route 3 (three) times a day   SBI/Protein Isolate (ENTERAGAM) 5 g PACK   Yes No   Sig: Take by mouth   amLODIPine (NORVASC) 5 mg tablet  Spouse/Significant Other No No   Sig: Take 1 tablet (5 mg total) by mouth every 12 (twelve) hours   aspirin (ECOTRIN LOW STRENGTH) 81 mg EC tablet  Spouse/Significant Other Yes No   Sig: Take 81 mg by mouth daily Resume on    atorvastatin (LIPITOR) 80 mg tablet Unknown at Unknown time Spouse/Significant Other No No   Sig: Take 1 tablet (80 mg total) by mouth daily with dinner   carvedilol (COREG) 6 25 mg tablet   No No   Sig: Take 1 tablet (6 25 mg total) by mouth 2 (two) times a day with meals   diphenoxylate-atropine (LOMOTIL) 2 5-0 025 mg per tablet   Yes No   Sig: Take 1 tablet by mouth 4 (four) times a day as needed for diarrhea   escitalopram (LEXAPRO) 10 mg tablet   No No   Sig: Take 1 tablet (10 mg total) by mouth daily at bedtime   famotidine (PEPCID) 20 mg tablet  Spouse/Significant Other Yes No   Sig: Take 20 mg by mouth daily Resume on    gabapentin (NEURONTIN) 250 mg/5 mL solution  Spouse/Significant Other No No   Sig: Take 12 mL (600 mg total) by mouth daily at bedtime   glucose 4 g chewable tablet  Spouse/Significant Other No No   Sig: Chew 3 tablets (12 g total) as needed for low blood sugar   glucose blood (ONE TOUCH ULTRA TEST) test strip  Spouse/Significant Other No No   Si each by Other route 3 (three) times a day Use as instructed   insulin aspart (NovoLOG) 100 units/mL injection   No No   Sig: Inject 4 Units under the skin 3 (three) times a day before meals & Scale:150-200 -2 units, 201-250 -4 units, 251-300 -6 units, 301-350 -8 units, >350 -10 units   Patient taking differently: Inject 8 Units under the skin 3 (three) times a day before meals & Scale:150-200 -2 units, 201-250 -4 units, 251-300 -6 units, 301-350 -8 units, >350 -10 units   insulin glargine (LANTUS) 100 units/mL subcutaneous injection   No No   Sig: Inject 20 Units under the skin daily   Patient taking differently: Inject 35 Units under the skin daily       Facility-Administered Medications: None       Past Medical History:   Diagnosis Date    Diabetes mellitus ([facility] )     Hypercholesteremia     Hyperlipidemia     Hypertension     Neuropathy     Obesity     Osteomyelitis ([facility] )     last assessed 11/4/16    PVC's (premature ventricular contractions)     sees cardiology Dr Kimmie camargo    Stroke [facility])     last weeof July 2018 [address]       Past Surgical History:   Procedure Laterality Date    ABDOMINAL SURGERY      CHOLECYSTECTOMY      Percutaneous    OTHER SURGICAL HISTORY      "stimulator to control bowel movements"    NC ESOPHAGOGASTRODUODENOSCOPY TRANSORAL DIAGNOSTIC N/A 9/27/2016    Procedure: ESOPHAGOGASTRODUODENOSCOPY (EGD); Surgeon: Murphy Sharma MD;  [facility]; Service: Gastroenterology    NC LAP,CHOLECYSTECTOMY N/A 2/29/2016    Procedure: LAPAROSCOPIC CHOLECYSTECTOMY ;  Surgeon: Gigi Smalls DO;  [facility];  Service: General    ROTATOR CUFF REPAIR Right     TOE AMPUTATION Right 10/28/2016    Procedure: 3RD TOE AMPUTATION ;  Surgeon: Steve Davis DPM;  [facility];  Service:        Family History   Problem Relation Age of Onset    Leukemia Mother     Liver disease Mother     Lung cancer Mother         heavy smoker - 3 ppd    Heart disease Father     Liver disease Father     Multiple myeloma Sister     Breast cancer Sister     Urolithiasis Family     Alcohol abuse Neg Hx     Depression Neg Hx     Drug abuse Neg Hx     Substance Abuse Neg Hx     Mental illness Neg Hx      I have reviewed and agree with the history as documented      Social History Tobacco Use    Smoking status: Never Smoker    Smokeless tobacco: Never Used   Substance Use Topics    Alcohol use: No    Drug use: No        Review of Systems   Constitutional: Positive for diaphoresis  Negative for activity change, chills and fever  HENT: Negative for congestion, sinus pressure and sore throat  Eyes: Negative for pain and visual disturbance  Respiratory: Negative for cough, chest tightness, shortness of breath, wheezing and stridor  Cardiovascular: Negative for chest pain and palpitations  Gastrointestinal: Positive for diarrhea, nausea and vomiting  Negative for abdominal distention, abdominal pain and constipation  Genitourinary: Negative for dysuria and frequency  Musculoskeletal: Negative for neck pain and neck stiffness  Skin: Negative for rash  Neurological: Negative for dizziness, speech difficulty, light-headedness, numbness and headaches  Physical Exam  Physical Exam   Constitutional: He appears well-developed  He appears lethargic  He appears distressed  HENT:   Head: Normocephalic and atraumatic  Eyes: Pupils are equal, round, and reactive to light  Neck: Normal range of motion  Neck supple  No tracheal deviation present  Cardiovascular: Normal rate, regular rhythm, normal heart sounds and intact distal pulses  No murmur heard  Pulmonary/Chest: Effort normal and breath sounds normal  No stridor  No respiratory distress  Abdominal: Soft  He exhibits no distension  There is no tenderness  There is no rebound and no guarding  Musculoskeletal: Normal range of motion  Neurological: He appears lethargic  GCS eye subscore is 3  GCS verbal subscore is 2  GCS motor subscore is 5  Patient initially GCS of 3, after IV D50, GCS increased to 10 (3, 2, 5)   Skin: Skin is warm and dry  He is not diaphoretic  No erythema  No pallor  Psychiatric: He has a normal mood and affect  Vitals reviewed        Vital Signs  ED Triage Vitals   Temperature Pulse Respirations Blood Pressure SpO2   08/24/19 1042 08/24/19 0936 08/24/19 0936 08/24/19 0936 08/24/19 0936   (!) 94 9 °F (34 9 °C) 66 16 158/74 94 %      Temp Source Heart Rate Source Patient Position - Orthostatic VS BP Location FiO2 (%)   08/24/19 1042 08/24/19 0936 08/24/19 1042 08/24/19 0936 --   Rectal Monitor Sitting Right arm       Pain Score       08/24/19 0936       No Pain           Vitals:    08/24/19 1130 08/24/19 1145 08/24/19 1200 08/24/19 1215   BP: 139/74 155/76 144/74 153/67   Pulse: 62 62 62 60   Patient Position - Orthostatic VS:             Visual Acuity  Visual Acuity      Most Recent Value   L Pupil Size (mm)  3   R Pupil Size (mm)  3          ED Medications  Medications   dextrose 5 % and sodium chloride 0 45 % infusion (75 mL/hr Intravenous New Bag 8/24/19 1141)   sodium chloride 0 9 % bolus 1,000 mL (0 mL Intravenous Stopped 8/24/19 1043)   dextrose 50 % IV solution 50 mL (50 mL Intravenous Given 8/24/19 1024)   dextrose 50 % IV solution 50 mL (50 mL Intravenous Given 8/24/19 0931)       Diagnostic Studies  Results Reviewed     Procedure Component Value Units Date/Time    Fingerstick Glucose (POCT) [[id number]]  (Normal) Collected:  08/24/19 1137    Lab Status:  Final result Updated:  08/24/19 1142     POC Glucose 109 mg/dl     Ammonia [[id number]]  (Normal) Collected:  08/24/19 1038    Lab Status:  Final result Specimen:  Blood from Arm, Left Updated:  08/24/19 1110     Ammonia 12 umol/L     Protime-INR [[id number]]  (Normal) Collected:  08/24/19 1038    Lab Status:  Final result Specimen:  Blood from Arm, Left Updated:  08/24/19 1052     Protime 14 0 seconds      INR 1 14    APTT [[id number]]  (Normal) Collected:  08/24/19 1038    Lab Status:  Final result Specimen:  Blood from Arm, Left Updated:  08/24/19 1052     PTT 33 seconds     Blood gas, venous [[id number]]  (Abnormal) Collected:  08/24/19 1038    Lab Status:  Final result Specimen:  Blood from Arm, Left Updated:  08/24/19 8844 pH, Sam 7 291     pCO2, Sam 40 8 mm Hg      pO2, Sam 68 7 mm Hg      HCO3, Sam 19 2 mmol/L      Base Excess, Sam -6 9 mmol/L      O2 Content, Sam 15 7 ml/dL      O2 HGB, VENOUS 89 9 %     Fingerstick Glucose (POCT) [[id number]]  (Normal) Collected:  08/24/19 1022    Lab Status:  Final result Updated:  08/24/19 1023     POC Glucose 76 mg/dl     Comprehensive metabolic panel [[id number]]  (Abnormal) Collected:  08/24/19 0948    Lab Status:  Final result Specimen:  Blood from Arm, Right Updated:  08/24/19 1023     Sodium 140 mmol/L      Potassium 3 9 mmol/L      Chloride 105 mmol/L      CO2 24 mmol/L      ANION GAP 11 mmol/L      BUN 36 mg/dL      Creatinine 2 95 mg/dL      Glucose 47 mg/dL      Calcium 9 2 mg/dL      AST 17 U/L      ALT 16 U/L      Alkaline Phosphatase 199 U/L      Total Protein 8 0 g/dL      Albumin 3 5 g/dL      Total Bilirubin 0 50 mg/dL      eGFR 22 ml/min/1 73sq m     Narrative:       National Kidney Disease Foundation guidelines for Chronic Kidney Disease (CKD):     Stage 1 with normal or high GFR (GFR > 90 mL/min/1 73 square meters)    Stage 2 Mild CKD (GFR = 60-89 mL/min/1 73 square meters)    Stage 3A Moderate CKD (GFR = 45-59 mL/min/1 73 square meters)    Stage 3B Moderate CKD (GFR = 30-44 mL/min/1 73 square meters)    Stage 4 Severe CKD (GFR = 15-29 mL/min/1 73 square meters)    Stage 5 End Stage CKD (GFR <15 mL/min/1 73 square meters)  Note: GFR calculation is accurate only with a steady state creatinine    CBC and differential [[id number]] Collected:  08/24/19 0948    Lab Status:  Final result Specimen:  Blood from Arm, Right Updated:  08/24/19 1006     WBC 9 79 Thousand/uL      RBC 4 83 Million/uL      Hemoglobin 13 7 g/dL      Hematocrit 42 3 %      MCV 88 fL      MCH 28 4 pg      MCHC 32 4 g/dL      RDW 13 4 %      MPV 10 6 fL      Platelets 607 Thousands/uL      nRBC 0 /100 WBCs      Neutrophils Relative 69 %      Immat GRANS % 1 %      Lymphocytes Relative 18 %      Monocytes Relative 10 %      Eosinophils Relative 2 %      Basophils Relative 0 %      Neutrophils Absolute 6 80 Thousands/µL      Immature Grans Absolute 0 06 Thousand/uL      Lymphocytes Absolute 1 80 Thousands/µL      Monocytes Absolute 0 93 Thousand/µL      Eosinophils Absolute 0 16 Thousand/µL      Basophils Absolute 0 04 Thousands/µL     Fingerstick Glucose (POCT) [[id number]]  (Abnormal) Collected:  08/24/19 0939    Lab Status:  Final result Updated:  08/24/19 0946     POC Glucose 184 mg/dl                  CT head without contrast   Final Result by Leah Matute DO (08/24 1031)   Moderate cerebral atrophy with chronic small vessel ischemic change  No acute intracranial abnormality  No significant change from priors  Workstation performed: [id number]                    Procedures  ECG 12 Lead Documentation Only  Date/Time: 8/24/2019 10:40 AM  Performed by: Mel Galvan DO  Authorized by: Mel Galvan DO     ECG reviewed by me, the ED Provider: yes    Patient location:  ED  Previous ECG:     Previous ECG:  Compared to current    Comparison ECG info:  7 9 2019    Similarity:  No change  Interpretation:     Interpretation: normal    Rate:     ECG rate:  66    ECG rate assessment: normal    Rhythm:     Rhythm: sinus rhythm    Ectopy:     Ectopy: none    QRS:     QRS axis:  Normal    QRS intervals:  Normal  Conduction:     Conduction: normal    ST segments:     ST segments:  Normal  T waves:     T waves: normal      CriticalCare Time  Performed by: Mel Galvan DO  Authorized by: Mel Galvan DO     Critical care provider statement:     Critical care time (minutes):  45    Critical care time was exclusive of:  Separately billable procedures and treating other patients    Critical care was necessary to treat or prevent imminent or life-threatening deterioration of the following conditions: Hypoglycemia hypothermia, altered mental status with low GCS      Critical care was time spent personally by me on the following activities:  Obtaining history from patient or surrogate, development of treatment plan with patient or surrogate, discussions with consultants, evaluation of patient's response to treatment, examination of patient, ordering and performing treatments and interventions, ordering and review of laboratory studies, ordering and review of radiographic studies, re-evaluation of patient's condition and review of old charts           ED Course  ED Course as of Aug 24 1234   Sat Aug 24, 2019   1029 Repeat examination long conversation with family, patient still not fully alert  , will check for CO2 retention, will obtain rectal temperature        1121 Patient still poorly responsive  , will admit to step-down      1145 Patient much more alert awake  Will continue active warming measures await ICU evaluation      1226 Patient clinically significantly improving  Critical care team down at bedside  , believe patient is appropriate for the floor  , will disposed floor  MDM  Number of Diagnoses or Management Options  [place] coma scale total score 3-8, at arrival to emergency department [facility]): new and requires workup  Hypoglycemia due to insulin: new and requires workup  Hypothermia, initial encounter: new and requires workup  Diagnosis management comments:       Initial ED assessment:  70-year-old male, presents with a blood sugar 40 D50 given, mental status improved with GCS 3 to GCS 10 but still minimally responsive  Initial DDx includes but is not limited to:   Recurrent/refractory hypoglycemia, discharged hypothermia, less likely infectious etiology    Initial ED plan:   Continue Accu-Cheks continued boluses D50, 2 total given at bedside during my initial evaluation likely placed on D5 half-normal saline drip monitor for signs of CO2 retention    Will perform CT head as patient does not back to baseline to treat does not have intracranial pathology  Final ED summary/disposition:   After evaluation and workup in the emergency department, recurrent hypoglycemia required multiple repeat boluses required active warming measures with Bob Hugger due to hypothermia  Admitted to hospital        Amount and/or Complexity of Data Reviewed  Clinical lab tests: ordered and reviewed  Tests in the radiology section of CPT®: ordered  Decide to obtain previous medical records or to obtain history from someone other than the patient: yes  Obtain history from someone other than the patient: yes  Review and summarize past medical records: yes  Discuss the patient with other providers: yes  Independent visualization of images, tracings, or specimens: yes        Disposition  Final diagnoses:   Hypoglycemia due to insulin   Hypothermia, initial encounter   [place] coma scale total score 3-8, at arrival to emergency department [facility])     Time reflects when diagnosis was documented in both MDM as applicable and the Disposition within this note     Time User Action Codes Description Comment    8/24/2019 11:49 AM Skylar Solar Add [E16 0,  T38 3X5A] Hypoglycemia due to insulin     8/24/2019 11:49 AM Skylar Solar Add [T68  XXXA] Hypothermia, initial encounter     8/24/2019 11:49 AM Skylar Solar Add [[id number]] [place] coma scale total score 9-12, at hospital admission     8/24/2019 11:49 AM Skylar [employer] Add [[id number]] [place] coma scale total score 3-8, at arrival to emergency department [facility])     8/24/2019 11:50 AM Skylar [employer] Remove [[id number]] [place] coma scale total score 9-12, at hospital admission       ED Disposition     ED Disposition Condition Date/Time Comment    Admit Stable Sat Aug 24, 2019 12:33 PM Case was discussed with Dr Mian Richards and the patient's admission status was agreed to be Admission Status: observation status to the service of Dr Mian Richards           Follow-up Information    None         Patient's Medications   Discharge Prescriptions    No medications on file     No discharge procedures on file      ED Provider  Electronically Signed by           Sadaf Sullivan DO  08/24/19 1554

## 2019-08-24 NOTE — QUICK NOTE
Critical Care Evaluation:  Asked by ED physician to evaluate the patient secondary to change in mental status and hypoglycemia that transiently responds to amps of D50  On my exam in the ED, the patient was awake and alert with no acute neuro deficits per wife  Wife states that she gave him 35u of Lantus and 8u of Novolog this AM    Patient was in the car when he went unresponsive and became diaphoretic  CT imaging of his head is negative  Patient currently on D5 infusion  Ok to admit to [facility]  Discussed with Dr Monica Harrison from ED  Call [phone] with questions       Amanda Velasquez PA-C

## 2019-08-25 VITALS
SYSTOLIC BLOOD PRESSURE: 157 MMHG | DIASTOLIC BLOOD PRESSURE: 81 MMHG | TEMPERATURE: 98.5 F | HEART RATE: 75 BPM | OXYGEN SATURATION: 96 % | HEIGHT: 70 IN | WEIGHT: 229.2 LBS | BODY MASS INDEX: 32.81 KG/M2 | RESPIRATION RATE: 16 BRPM

## 2019-08-25 LAB
ANION GAP SERPL CALCULATED.3IONS-SCNC: 9 MMOL/L (ref 4–13)
BUN SERPL-MCNC: 32 MG/DL (ref 5–25)
CALCIUM SERPL-MCNC: 8.6 MG/DL (ref 8.3–10.1)
CHLORIDE SERPL-SCNC: 107 MMOL/L (ref 100–108)
CO2 SERPL-SCNC: 24 MMOL/L (ref 21–32)
CREAT SERPL-MCNC: 2.81 MG/DL (ref 0.6–1.3)
ERYTHROCYTE [DISTWIDTH] IN BLOOD BY AUTOMATED COUNT: 13.5 % (ref 11.6–15.1)
GFR SERPL CREATININE-BSD FRML MDRD: 24 ML/MIN/1.73SQ M
GLUCOSE SERPL-MCNC: 140 MG/DL (ref 65–140)
GLUCOSE SERPL-MCNC: 159 MG/DL (ref 65–140)
GLUCOSE SERPL-MCNC: 166 MG/DL (ref 65–140)
GLUCOSE SERPL-MCNC: 189 MG/DL (ref 65–140)
HCT VFR BLD AUTO: 35.1 % (ref 36.5–49.3)
HGB BLD-MCNC: 11.1 G/DL (ref 12–17)
MAGNESIUM SERPL-MCNC: 1.7 MG/DL (ref 1.6–2.6)
MCH RBC QN AUTO: 28 PG (ref 26.8–34.3)
MCHC RBC AUTO-ENTMCNC: 31.6 G/DL (ref 31.4–37.4)
MCV RBC AUTO: 89 FL (ref 82–98)
PLATELET # BLD AUTO: 155 THOUSANDS/UL (ref 149–390)
PMV BLD AUTO: 10.9 FL (ref 8.9–12.7)
POTASSIUM SERPL-SCNC: 4.1 MMOL/L (ref 3.5–5.3)
RBC # BLD AUTO: 3.96 MILLION/UL (ref 3.88–5.62)
SODIUM SERPL-SCNC: 140 MMOL/L (ref 136–145)
WBC # BLD AUTO: 7.39 THOUSAND/UL (ref 4.31–10.16)

## 2019-08-25 PROCEDURE — 99217 PR OBSERVATION CARE DISCHARGE MANAGEMENT: CPT | Performed by: INTERNAL MEDICINE

## 2019-08-25 PROCEDURE — 80048 BASIC METABOLIC PNL TOTAL CA: CPT | Performed by: INTERNAL MEDICINE

## 2019-08-25 PROCEDURE — 83735 ASSAY OF MAGNESIUM: CPT | Performed by: INTERNAL MEDICINE

## 2019-08-25 PROCEDURE — 85027 COMPLETE CBC AUTOMATED: CPT | Performed by: INTERNAL MEDICINE

## 2019-08-25 PROCEDURE — 82948 REAGENT STRIP/BLOOD GLUCOSE: CPT

## 2019-08-25 RX ORDER — INSULIN GLARGINE 100 [IU]/ML
20 INJECTION, SOLUTION SUBCUTANEOUS DAILY
Qty: 10 ML | Refills: 0 | Status: SHIPPED | OUTPATIENT
Start: 2019-08-25 | End: 2019-10-07 | Stop reason: SDUPTHER

## 2019-08-25 RX ADMIN — FAMOTIDINE 20 MG: 20 TABLET ORAL at 09:02

## 2019-08-25 RX ADMIN — AMLODIPINE BESYLATE 5 MG: 5 TABLET ORAL at 03:07

## 2019-08-25 RX ADMIN — CARVEDILOL 6.25 MG: 6.25 TABLET, FILM COATED ORAL at 09:14

## 2019-08-25 RX ADMIN — ASPIRIN 81 MG: 81 TABLET, COATED ORAL at 09:02

## 2019-08-25 NOTE — UTILIZATION REVIEW
Initial Clinical Review    Admission: Date/Time/Statement: 08/24/2019 @ 1233  Orders Placed This Encounter   Procedures    Place in Observation (expected length of stay for this patient is less than two midnights)     Standing Status:   Standing     Number of Occurrences:   1     Order Specific Question:   Admitting Physician     Answer:   Tennille Al     Order Specific Question:   Level of Care     Answer:   Med Surg [16]     ED Arrival Information     Expected Arrival Acuity Means of Arrival Escorted By Service Admission Type    - 8/24/2019 09:30 Emergent Wheelchair Family Member General Medicine Emergency    Arrival Complaint    -        Chief Complaint   Patient presents with    Hypoglycemia - Symptomatic     family drove pt in  they were heading to the car show and pt became unresponsive  upon arrival, pt was unresponsive and diaphoretic  had to be lifted out of the car  fsg (finger stick glucose) on arrival was 40  pt ate today per wife     Assessment/Plan: 61year old male, presented to the ED from home (on the way to car show) via car  Admitted as Observation due to hypoglycemia  Presented with an unresponsive episode,  He was given IV dextrose  There was delay in improving his mentation back to baseline, hence critical care was called  Patient was recommended overnight observation, currently returned to baseline  Patient has been having diarrhea for last 3-4 days, has not taken any of insulin last few days  Today is the 1st day he did not have any diarrhea, he ate to eggs and toast, then took 35 units of Lantus and 8 units of NovoLog  symptomatic hypoglycemia:   Closely monitor blood sugars  Continue D5 for another 2 hours  Sliding scale insulin  Hold long-acting insulin for today      08/25/2019  DC order entered - Home    ED Triage Vitals   Temperature Pulse Respirations Blood Pressure SpO2   08/24/19 1042 08/24/19 0936 08/24/19 0936 08/24/19 0936 08/24/19 0936   (!) 94 9 °F (34 9 °C) 66 16 158/74 94 %      Temp Source Heart Rate Source Patient Position - Orthostatic VS BP Location FiO2 (%)   08/24/19 1042 08/24/19 0936 08/24/19 1042 08/24/19 0936 --   Rectal Monitor Sitting Right arm       Pain Score       08/24/19 0936       No Pain        Wt Readings from Last 1 Encounters:   08/24/19 104 kg (229 lb 3 2 oz)     Additional Vital Signs:   Date/Time  Temp  Pulse  Resp  BP  SpO2  O2 Device  Patient Position - Orthostatic VS   08/25/19 0700  98 5 °F (36 9 °C)  75    157/81  96 %  None (Room air)  Lying   08/24/19 2214  98 °F (36 7 °C)  90    169/82  91 %  None (Room air)  Lying   08/24/19 1540        155/75      Lying   08/24/19 1404        176/81Abnormal       Lying   08/24/19 1353  98 1 °F (36 7 °C)  69  16  185/90Abnormal   99 %  None (Room air)  Lying   08/24/19 1215  97 4 °F (36 3 °C)Abnormal   60  16  153/67  95 %       08/24/19 1200    62  16  144/74  95 %       08/24/19 1145    62  16  155/76  97 %       08/24/19 1130    62  16  139/74  95 %       08/24/19 1115    62  16  136/80  96 %       08/24/19 1042  94 9 °F (34 9 °C)Abnormal   62  16  142/77  95 %  Nasal cannula  Sitting   08/24/19 0936    66  16  158/74  94 %  None (Room air)       Date and Time Eye Opening Best Verbal Response Best Motor Response Mamie Coma Scale Score   08/25/19 0715 4 5 6 15   08/24/19 1930 4 5 6 15   08/24/19 1330 4 5 6 15   08/24/19 1138 4 4 6 14   08/24/19 1043 3 4 6 13   08/24/19 0951 3 4 6 13   08/24/19 0931 3 2 1 6         Pertinent Labs/Diagnostic Test Results:   Results from last 7 days   Lab Units 08/25/19  0428 08/24/19  0948 08/23/19  1313   WBC Thousand/uL 7 39 9 79 7 41   HEMOGLOBIN g/dL 11 1* 13 7 12 4   HEMATOCRIT % 35 1* 42 3 38 9   PLATELETS Thousands/uL 155 185 163   NEUTROS ABS Thousands/µL  --  6 80 5 14     Results from last 7 days   Lab Units 08/25/19  0428 08/24/19  0948 08/23/19  1313   SODIUM mmol/L 140 140 139   POTASSIUM mmol/L 4 1 3 9 4 5 CHLORIDE mmol/L 107 105 107   CO2 mmol/L 24 24 25   ANION GAP mmol/L 9 11 7   BUN mg/dL 32* 36* 38*   CREATININE mg/dL 2 81* 2 95* 3 07*   EGFR ml/min/1 73sq m 24 22 21   CALCIUM mg/dL 8 6 9 2 8 7   MAGNESIUM mg/dL 1 7  --   --      Results from last 7 days   Lab Units 08/24/19  1038 08/24/19  0948 08/23/19  1313   AST U/L  --  17 13   ALT U/L  --  16 20   ALK PHOS U/L  --  199* 179*   TOTAL PROTEIN g/dL  --  8 0 7 1   ALBUMIN g/dL  --  3 5 3 1*   TOTAL BILIRUBIN mg/dL  --  0 50 0 60   AMMONIA umol/L 12  --   --      Results from last 7 days   Lab Units 08/25/19  0710 08/25/19  0214 08/24/19  2052 08/24/19  1724 08/24/19  1419 08/24/19  1137 08/24/19  1022 08/24/19  0939   POC GLUCOSE mg/dl 140 166* 178* 240* 167* 109 76 184*     Results from last 7 days   Lab Units 08/25/19  0428 08/24/19  0948 08/23/19  1313   GLUCOSE RANDOM mg/dL 159* 47* 112     Results from last 7 days   Lab Units 08/24/19  1038   PH SONDRA  7 291*   PCO2 SONDRA mm Hg 40 8*   PO2 SONDRA mm Hg 68 7*   HCO3 SONDRA mmol/L 19 2*   BASE EXC SONDRA mmol/L -6 9   O2 CONTENT SONDRA ml/dL 15 7   O2 HGB, VENOUS % 89 9*     Results from last 7 days   Lab Units 08/24/19  1038   PROTIME seconds 14 0   INR  1 14   PTT seconds 33     Results from last 7 days   Lab Units 08/23/19  1314   C DIFF TOXIN B  NEGATIVE for C difficle toxin by PCR  Results from last 7 days   Lab Units 08/23/19  1313   SALMONELLA SP PCR  None Detected   SHIGELLA SP/ENTEROINVASIVE E  COLI (EIEC)  None Detected   CAMPYLOBACTER SP (JEJUNI AND COLI)  None Detected   SHIGA TOXIN 1/SHIGA TOXIN 2  None Detected     08/24/2019 @ 1029  CT head: Moderate cerebral atrophy with chronic small vessel ischemic change  No acute intracranial abnormality   No significant change from priors  08/23/2019 @ 1532 pelvis X:  No acute osseous abnormality      ED Treatment:   Medication Administration from 08/24/2019 0930 to 08/24/2019 1401       Date/Time Order Dose Route Action     08/24/2019 0949 sodium chloride 0 9 % bolus 1,000 mL 1,000 mL Intravenous New Bag     08/24/2019 1024 dextrose 50 % IV solution 50 mL 50 mL Intravenous Given     08/24/2019 0931 dextrose 50 % IV solution 50 mL 50 mL Intravenous Given     08/24/2019 1141 dextrose 5 % and sodium chloride 0 45 % infusion 75 mL/hr Intravenous New Bag        Past Medical History:   Diagnosis Date    Diabetes mellitus ([facility] )     Hypercholesteremia     Hyperlipidemia     Hypertension     Neuropathy     Obesity     [facility]     last assessed 11/4/16    PVC's (premature ventricular contractions)     sees cardiology Dr Daniel camargo    Stroke [facility])     last weeof July 2018 [facility]     Present on Admission:   Benign hypertension with CKD (chronic kidney disease) stage IV ([facility] )   symptomatic hypoglycemia   Stage 4 chronic kidney disease ([facility] )   Mixed hyperlipidemia      Admitting Diagnosis: Hypoglycemia [E16 2]  Hypothermia, initial encounter [T68  XXXA]  Hypoglycemia due to insulin [E16 0, T38 3X5A]  [place] coma scale total score 3-8, at arrival to emergency department [facility]) [K19 9678]  Age/Sex: 61 y o  male  Admission Orders:  Current Facility-Administered Medications:  acetaminophen 650 mg Oral Q6H PRN   amLODIPine 5 mg Oral Q12H   aspirin 81 mg Oral Daily   atorvastatin 80 mg Oral Daily With Dinner   calcium carbonate 1,000 mg Oral Daily PRN   carvedilol 6 25 mg Oral BID With Meals   diphenoxylate-atropine 1 tablet Oral 4x Daily PRN   docusate sodium 100 mg Oral BID PRN   escitalopram 10 mg Oral HS   famotidine 20 mg Oral Daily   gabapentin 600 mg Oral HS   insulin lispro 1-5 Units Subcutaneous TID AC   insulin lispro 1-5 Units Subcutaneous HS   Labetalol HCl 10 mg Intravenous Q4H PRN   ondansetron 4 mg Intravenous Q6H PRN           Network Utilization Review Department  Phone: [phone]; Fax [phone]  [email or web address]@"1,2,3 [email or web address]"  org  ATTENTION: Please call with any questions or concerns to [phone]  and carefully listen to the prompts so that you are directed to the right person  Send all requests for admission clinical reviews, approved or denied determinations and any other requests to fax [phone]   All voicemails are confidential

## 2019-08-25 NOTE — PLAN OF CARE
Problem: SAFETY ADULT  Goal: Patient will remain free of falls  Description  INTERVENTIONS:  - Assess patient frequently for physical needs  -  Identify cognitive and physical deficits and behaviors that affect risk of falls    -  [place] fall precautions as indicated by assessment   - Educate patient/family on patient safety including physical limitations  - Instruct patient to call for assistance with activity based on assessment  - Modify environment to reduce risk of injury  - Consider OT/PT consult to assist with strengthening/mobility  8/25/2019 1129 by Aaron Everett RN  Outcome: Completed  8/25/2019 1013 by Aaron Everett RN  Outcome: Progressing  Goal: Maintain or return to baseline ADL function  Description  INTERVENTIONS:  -  Assess patient's ability to carry out ADLs; assess patient's baseline for ADL function and identify physical deficits which impact ability to perform ADLs (bathing, care of mouth/teeth, toileting, grooming, dressing, etc )  - Assess/evaluate cause of self-care deficits   - Assess range of motion  - Assess patient's mobility; develop plan if impaired  - Assess patient's need for assistive devices and provide as appropriate  - Encourage maximum independence but intervene and supervise when necessary  - Involve family in performance of ADLs  - Assess for home care needs following discharge   - Consider OT consult to assist with ADL evaluation and planning for discharge  - Provide patient education as appropriate  8/25/2019 1129 by Aaron Everett RN  Outcome: Completed  8/25/2019 1013 by Aaron Everett RN  Outcome: Progressing  Goal: Maintain or return mobility status to optimal level  Description  INTERVENTIONS:  - Assess patient's baseline mobility status (ambulation, transfers, stairs, etc )    - Identify cognitive and physical deficits and behaviors that affect mobility  - Identify mobility aids required to assist with transfers and/or ambulation (gait belt, sit-to-stand, lift, walker, cane, etc )  - [place] fall precautions as indicated by assessment  - Record patient progress and toleration of activity level on Mobility SBAR; progress patient to next Phase/Stage  - Instruct patient to call for assistance with activity based on assessment  - Consider rehabilitation consult to assist with strengthening/weightbearing, etc   8/25/2019 1129 by Deanne Ruvalcaba RN  Outcome: Completed  8/25/2019 1013 by Deanne Ruvalcaba RN  Outcome: Progressing     Problem: DISCHARGE PLANNING  Goal: Discharge to home or other facility with appropriate resources  Description  INTERVENTIONS:  - Identify barriers to discharge w/patient and caregiver  - Arrange for needed discharge resources and transportation as appropriate  - Identify discharge learning needs (meds, wound care, etc )  - Arrange for interpretive services to assist at discharge as needed  - Refer to Case Management Department for coordinating discharge planning if the patient needs post-hospital services based on physician/advanced practitioner order or complex needs related to functional status, cognitive ability, or social support system  8/25/2019 1129 by Deanne Ruvalcaba RN  Outcome: Completed  8/25/2019 1013 by Deanne Ruvalcaba RN  Outcome: Progressing     Problem: Knowledge Deficit  Goal: Patient/family/caregiver demonstrates understanding of disease process, treatment plan, medications, and discharge instructions  Description  Complete learning assessment and assess knowledge base    Interventions:  - Provide teaching at level of understanding  - Provide teaching via preferred learning methods  8/25/2019 1129 by Deanne Ruvalcaba RN  Outcome: Completed  8/25/2019 1013 by Deanne Ruvalcaba RN  Outcome: Progressing     Problem: METABOLIC, FLUID AND ELECTROLYTES - ADULT  Goal: Electrolytes maintained within normal limits  Description  INTERVENTIONS:  - Monitor labs and assess patient for signs and symptoms of electrolyte imbalances  - Administer electrolyte replacement as ordered  - Monitor response to electrolyte replacements, including repeat lab results as appropriate  - Instruct patient on fluid and nutrition as appropriate  8/25/2019 1129 by Bhupinder Pedroza RN  Outcome: Completed  8/25/2019 1013 by Bhupinder Pedroza RN  Outcome: Progressing  Goal: Fluid balance maintained  Description  INTERVENTIONS:  - Monitor labs   - Monitor I/O and WT  - Instruct patient on fluid and nutrition as appropriate  - Assess for signs & symptoms of volume excess or deficit  8/25/2019 1129 by Bhupinder Pedroza RN  Outcome: Completed  8/25/2019 1013 by Bhupinder Pedroza RN  Outcome: Progressing  Goal: Glucose maintained within target range  Description  INTERVENTIONS:  - Monitor Blood Glucose as ordered  - Assess for signs and symptoms of hyperglycemia and hypoglycemia  - Administer ordered medications to maintain glucose within target range  - Assess nutritional intake and initiate nutrition service referral as needed  8/25/2019 1129 by Bhupinder Pedroza RN  Outcome: Completed  8/25/2019 1013 by Bhupinder Pedroza RN  Outcome: Progressing     Problem: HEMATOLOGIC - ADULT  Goal: Maintains hematologic stability  Description  INTERVENTIONS  - Assess for signs and symptoms of bleeding or hemorrhage  - Monitor labs  - Administer supportive blood products/factors as ordered and appropriate  8/25/2019 1129 by Bhupinder Pedroza RN  Outcome: Completed  8/25/2019 1013 by Bhupinder Pedroza RN  Outcome: Progressing

## 2019-08-25 NOTE — ASSESSMENT & PLAN NOTE
Lab Results   Component Value Date    HGBA1C 7 2 (H) 07/10/2019       Recent Labs     08/24/19  1419 08/24/19  1724 08/24/19 2052 08/25/19  0214   POCGLU 167* 240* 178* 166*       Blood Sugar Average: Last 72 hrs:  (P) 160   A1c 7 2  · Continue home lantus dose, decrease novolog to 4 units tid with blood sugar checks at home    · Diabetes education and endocrinology referal

## 2019-08-25 NOTE — DISCHARGE SUMMARY
Discharge- Rick Cargo 1960, 61 y o  male MRN: [id number]    Unit/Bed#: -01 Encounter: [id number]    Primary Care Provider: Aisha Cristobal DO   Date and time admitted to hospital: 8/24/2019  9:30 AM        * symptomatic hypoglycemia  Assessment & Plan  · Patient presented unresponsive; blood glucose noted to be at 40  Received Lantus 35 units and NovoLog 8 units a m  After breakfast  Patient became awake and responsive after receiving D50 in ED  Resolved as today's glucose: 159  Patient is awake, alert, and responsive  · Continue home lantus dose, decrease novolog to 4 units tid with blood sugar checks at home  · Diabetes education and endocrinology referal        Hypothermia  Assessment & Plan  · Improved  · Due to hypoglycemia        Type 2 diabetes mellitus with hyperglycemia, with long-term current use of insulin [facility])  Assessment & Plan  Lab Results   Component Value Date    HGBA1C 7 2 (H) 07/10/2019       Recent Labs     08/24/19  1419 08/24/19  1724 08/24/19  2052 08/25/19  0214   POCGLU 167* 240* 178* 166*       Blood Sugar Average: Last 72 hrs:  (P) 160   A1c 7 2  · Continue home lantus dose, decrease novolog to 4 units tid with blood sugar checks at home    · Diabetes education and endocrinology referal    Mixed hyperlipidemia  Assessment & Plan  · Continue statin    Benign hypertension with CKD (chronic kidney disease) stage IV ([facility])  Assessment & Plan  · Continue Coreg and amlodipine    Stage 4 chronic kidney disease ([facility] )  Assessment & Plan  · Creatinine close to baseline (Baseline 2 2-2 4)  · Follow outpatient with Nephrology    History of stroke  Assessment & Plan  · Supportive care and aspirin    Discharging Physician / Practitioner: Jn Bianchi MD  PCP: Aisha Cristobal DO  Admission Date:   Admission Orders (From admission, onward)     Ordered        08/24/19 1233  Place in Observation (expected length of stay for this patient is less than two midnights)  Once Discharge Date: 08/25/19      Consultations During Hospital Stay:  · None    Procedures Performed:   · None    Significant Findings / Test Results:   · See above findings and plans    Incidental Findings:   · None     Test Results Pending at Discharge (will require follow up): · None     Outpatient Tests Requested:  · None    Complications:  None    Reason for Admission: Symptomatic hypoglycemia     Hospital Course:     Mica Marin is a 61 y o  male patient who originally presented to the hospital on 8/24/2019 due to symptomatic hypoglycemia  Patient was found unresponsive and diaphoretic while he was in car  He was admitted to hospital and found his serum glucose to be 40, received D50 in ED and regain complete consciousness afterward  He had diarrhea for the weak prior to his admission which is resolved now  Patient denied similar episodes in the past  He is diabetic on lantus and novolog  And stated he took his meds the morning of admission after breakfast  CT head was normal  His long acting insulin was held and his blood sugar improved  On second day of admission, he is alert and coherent, serum glucose improved  He denies SOB, Tremor, diaphoresis or palpitation  Physical exam is unremarkable  Please see above list of diagnoses and related plan for additional information  Condition at Discharge: stable     Discharge Day Visit / Exam:     Subjective:  Patient is alert and responsive to questions appropriately  Denies any pain, SOB or any other complaints  Vitals: Blood Pressure: 157/81 (08/25/19 0700)  Pulse: 75 (08/25/19 0700)  Temperature: 98 5 °F (36 9 °C) (08/25/19 0700)  Temp Source: Oral (08/25/19 0700)  Respirations: 16 (08/24/19 1353)  Height: 5' 10" (177 8 cm) (08/24/19 1353)  Weight - Scale: 104 kg (229 lb 3 2 oz) (08/24/19 1353)  SpO2: 96 % (08/25/19 0700)  Exam:   Physical Exam   Constitutional: He is oriented to person, place, and time  He appears well-developed   No distress  HENT:   Head: Normocephalic and atraumatic  Mouth/Throat: No oropharyngeal exudate  Eyes: Conjunctivae are normal  No scleral icterus  Neck: Neck supple  Cardiovascular: Normal rate, regular rhythm and normal heart sounds  No murmur heard  Pulmonary/Chest: Effort normal and breath sounds normal  No stridor  No respiratory distress  He has no wheezes  He has no rales  Abdominal: Soft  Bowel sounds are normal  He exhibits no mass  There is no tenderness  Musculoskeletal: He exhibits no edema  Neurological: He is alert and oriented to person, place, and time  He exhibits normal muscle tone  Skin: Capillary refill takes less than 2 seconds  No rash noted  He is not diaphoretic  No erythema  No pallor  Psychiatric: He has a normal mood and affect  Discharge instructions/Information to patient and family:   See after visit summary for information provided to patient and family  Provisions for Follow-Up Care:  See after visit summary for information related to follow-up care and any pertinent home health orders  Disposition:     Home      Planned Readmission: None       Discharge Medications:  See after visit summary for reconciled discharge medications provided to patient and family        ** Please Note: This note has been constructed using a voice recognition system **

## 2019-08-25 NOTE — PLAN OF CARE
Problem: SAFETY ADULT  Goal: Patient will remain free of falls  Description  INTERVENTIONS:  - Assess patient frequently for physical needs  -  Identify cognitive and physical deficits and behaviors that affect risk of falls    -  [place] fall precautions as indicated by assessment   - Educate patient/family on patient safety including physical limitations  - Instruct patient to call for assistance with activity based on assessment  - Modify environment to reduce risk of injury  - Consider OT/PT consult to assist with strengthening/mobility  Outcome: Progressing  Goal: Maintain or return to baseline ADL function  Description  INTERVENTIONS:  -  Assess patient's ability to carry out ADLs; assess patient's baseline for ADL function and identify physical deficits which impact ability to perform ADLs (bathing, care of mouth/teeth, toileting, grooming, dressing, etc )  - Assess/evaluate cause of self-care deficits   - Assess range of motion  - Assess patient's mobility; develop plan if impaired  - Assess patient's need for assistive devices and provide as appropriate  - Encourage maximum independence but intervene and supervise when necessary  - Involve family in performance of ADLs  - Assess for home care needs following discharge   - Consider OT consult to assist with ADL evaluation and planning for discharge  - Provide patient education as appropriate  Outcome: Progressing  Goal: Maintain or return mobility status to optimal level  Description  INTERVENTIONS:  - Assess patient's baseline mobility status (ambulation, transfers, stairs, etc )    - Identify cognitive and physical deficits and behaviors that affect mobility  - Identify mobility aids required to assist with transfers and/or ambulation (gait belt, sit-to-stand, lift, walker, cane, etc )  - [place] fall precautions as indicated by assessment  - Record patient progress and toleration of activity level on Mobility SBAR; progress patient to next Phase/Stage  - Instruct patient to call for assistance with activity based on assessment  - Consider rehabilitation consult to assist with strengthening/weightbearing, etc   Outcome: Progressing     Problem: DISCHARGE PLANNING  Goal: Discharge to home or other facility with appropriate resources  Description  INTERVENTIONS:  - Identify barriers to discharge w/patient and caregiver  - Arrange for needed discharge resources and transportation as appropriate  - Identify discharge learning needs (meds, wound care, etc )  - Arrange for interpretive services to assist at discharge as needed  - Refer to Case Management Department for coordinating discharge planning if the patient needs post-hospital services based on physician/advanced practitioner order or complex needs related to functional status, cognitive ability, or social support system  Outcome: Progressing     Problem: Knowledge Deficit  Goal: Patient/family/caregiver demonstrates understanding of disease process, treatment plan, medications, and discharge instructions  Description  Complete learning assessment and assess knowledge base    Interventions:  - Provide teaching at level of understanding  - Provide teaching via preferred learning methods  Outcome: Progressing     Problem: METABOLIC, FLUID AND ELECTROLYTES - ADULT  Goal: Electrolytes maintained within normal limits  Description  INTERVENTIONS:  - Monitor labs and assess patient for signs and symptoms of electrolyte imbalances  - Administer electrolyte replacement as ordered  - Monitor response to electrolyte replacements, including repeat lab results as appropriate  - Instruct patient on fluid and nutrition as appropriate  Outcome: Progressing  Goal: Fluid balance maintained  Description  INTERVENTIONS:  - Monitor labs   - Monitor I/O and WT  - Instruct patient on fluid and nutrition as appropriate  - Assess for signs & symptoms of volume excess or deficit  Outcome: Progressing  Goal: Glucose maintained within target range  Description  INTERVENTIONS:  - Monitor Blood Glucose as ordered  - Assess for signs and symptoms of hyperglycemia and hypoglycemia  - Administer ordered medications to maintain glucose within target range  - Assess nutritional intake and initiate nutrition service referral as needed  Outcome: Progressing     Problem: HEMATOLOGIC - ADULT  Goal: Maintains hematologic stability  Description  INTERVENTIONS  - Assess for signs and symptoms of bleeding or hemorrhage  - Monitor labs  - Administer supportive blood products/factors as ordered and appropriate  Outcome: Progressing

## 2019-08-25 NOTE — ASSESSMENT & PLAN NOTE
· Patient presented unresponsive; blood glucose noted to be at 40  Received Lantus 35 units and NovoLog 8 units a m  After breakfast  Patient became awake and responsive after receiving D50 in ED  Resolved as today's glucose: 159  Patient is awake, alert, and responsive  · Continue home lantus dose, decrease novolog to 4 units tid with blood sugar checks at home    · Diabetes education and endocrinology referal

## 2019-08-26 LAB — GLUCOSE SERPL-MCNC: 40 MG/DL (ref 65–140)

## 2019-08-26 NOTE — ED PROVIDER NOTES
History  Chief Complaint   Patient presents with    Diarrhea     C/o persistent diarrhea x7 days, no appetite  Given rx medication to stop diarrhea from GI doctor, ineffective   Fall     Pt fell last night while trying to get to restroom, c/o RIGHT proximal/posterior leg and right buttock pain  61 yr male with multiple chronic medical problems-- c/o 1 week of approx 4 episodes daily of brown loose stool -- no new diet/ meds/ travel/ recent antibx-- no abd pain - no fevers/ no v/d--  Pt complAINT with meds- states sugar s have been in his normal raNGE--       History provided by:  Patient and spouse   used: No        Prior to Admission Medications   Prescriptions Last Dose Informant Patient Reported? Taking? Blood Glucose Monitoring Suppl (ONE TOUCH ULTRA MINI) w/Device KIT  Spouse/Significant Other No Yes   Sig: by Does not apply route 3 (three) times a day   Blood Pressure Monitoring (BLOOD PRESSURE CUFF) MISC  Spouse/Significant Other No Yes   Sig: Use to check blood pressure before taking blood pressure medication and 1 hour after and follow instructions provided in discharge instructions based on the readings     [employer] VITAMIN B-12 1000 MCG tablet  Spouse/Significant Other No Yes   Sig: TAKE 1 TABLET BY MOUTH EVERY DAY   Cholecalciferol (VITAMIN D3) 01533 units CAPS  Spouse/Significant Other No Yes   Sig: TAKE 1 TABLET BY MOUTH ONCE A WEEK   Incontinence Supplies (MALE URINAL) MISC  Spouse/Significant Other No Yes   Sig: by Does not apply route daily   Insulin Syringe-Needle U-100 (B-D INS SYR ULTRAFINE  3CC/30G) 30G X 1/2" 0 3 ML MISC  Spouse/Significant Other No Yes   Sig: by Does not apply route 4 (four) times a day   Insulin Syringe-Needle U-100 (B-D INS SYRINGE 0 5CC/30GX1/2") 30G X 1/2" 0 5 ML MISC   No Yes   Sig: Inject under the skin 4 (four) times a day   LUCENTIS 0 3 MG/0 05ML Unknown at Unknown time Spouse/Significant Other Yes No   ONETOUCH DELICA LANCETS 26N MISC Spouse/Significant Other No Yes   Sig: by Does not apply route 3 (three) times a day   SBI/Protein Isolate (ENTERAGAM) 5 g PACK   Yes Yes   Sig: Take by mouth   amLODIPine (NORVASC) 5 mg tablet  Spouse/Significant Other No Yes   Sig: Take 1 tablet (5 mg total) by mouth every 12 (twelve) hours   aspirin (ECOTRIN LOW STRENGTH) 81 mg EC tablet  Spouse/Significant Other Yes Yes   Sig: Take 81 mg by mouth daily Resume on    atorvastatin (LIPITOR) 80 mg tablet Unknown at Unknown time Spouse/Significant Other No No   Sig: Take 1 tablet (80 mg total) by mouth daily with dinner   carvedilol (COREG) 6 25 mg tablet   No Yes   Sig: Take 1 tablet (6 25 mg total) by mouth 2 (two) times a day with meals   diphenoxylate-atropine (LOMOTIL) 2 5-0 025 mg per tablet   Yes Yes   Sig: Take 1 tablet by mouth 4 (four) times a day as needed for diarrhea   escitalopram (LEXAPRO) 10 mg tablet   No No   Sig: Take 1 tablet (10 mg total) by mouth daily at bedtime   famotidine (PEPCID) 20 mg tablet  Spouse/Significant Other Yes Yes   Sig: Take 20 mg by mouth daily Resume on    gabapentin (NEURONTIN) 250 mg/5 mL solution  Spouse/Significant Other No Yes   Sig: Take 12 mL (600 mg total) by mouth daily at bedtime   glucose 4 g chewable tablet  Spouse/Significant Other No Yes   Sig: Chew 3 tablets (12 g total) as needed for low blood sugar   glucose blood (ONE TOUCH ULTRA TEST) test strip  Spouse/Significant Other No Yes   Si each by Other route 3 (three) times a day Use as instructed      Facility-Administered Medications: None       Past Medical History:   Diagnosis Date    Diabetes mellitus ([facility] [address] )     Hypercholesteremia     Hyperlipidemia     Hypertension     Neuropathy     Obesity     Osteomyelitis ([facility] [address] )     last assessed 16    PVC's (premature ventricular contractions)     sees cardiology Dr Daniel camargo    Stroke [facility])     last weeof 2018 [id number]       Past Surgical History:   Procedure Laterality Date    ABDOMINAL SURGERY      CHOLECYSTECTOMY      Percutaneous    OTHER SURGICAL HISTORY      "stimulator to control bowel movements"    OR ESOPHAGOGASTRODUODENOSCOPY TRANSORAL DIAGNOSTIC N/A 9/27/2016    Procedure: ESOPHAGOGASTRODUODENOSCOPY (EGD); Surgeon: Daniel Gusman MD;  [facility]; Service: Gastroenterology    OR LAP,CHOLECYSTECTOMY N/A 2/29/2016    Procedure: LAPAROSCOPIC CHOLECYSTECTOMY ;  Surgeon: Kingston Stapleton DO;  [facility];  Service: General    ROTATOR CUFF REPAIR Right     TOE AMPUTATION Right 10/28/2016    Procedure: 3RD TOE AMPUTATION ;  Surgeon: Lesly Espinoza DPM;  [facility];  Service:        Family History   Problem Relation Age of Onset    Leukemia Mother     Liver disease Mother     Lung cancer Mother         heavy smoker - 3 ppd    Heart disease Father     Liver disease Father     Multiple myeloma Sister     Breast cancer Sister     Urolithiasis Family     Alcohol abuse Neg Hx     Depression Neg Hx     Drug abuse Neg Hx     Substance Abuse Neg Hx     Mental illness Neg Hx      I have reviewed and agree with the history as documented  Social History     Tobacco Use    Smoking status: Never Smoker    Smokeless tobacco: Never Used   Substance Use Topics    Alcohol use: No    Drug use: No        Review of Systems   Constitutional: Negative  HENT: Negative  Eyes: Negative  Respiratory: Negative  Cardiovascular: Negative  Gastrointestinal: Positive for diarrhea  Negative for abdominal distention, abdominal pain, anal bleeding, blood in stool, constipation, nausea, rectal pain and vomiting  Endocrine: Negative  Genitourinary: Negative  Musculoskeletal: Negative  Skin: Negative  Allergic/Immunologic: Negative  Neurological: Negative  Hematological: Negative  Psychiatric/Behavioral: Negative          Physical Exam  Physical Exam   Constitutional: He is oriented to person, place, and time  No distress  AVSS--  PULSE OX 96 % ON RA- INTERPRETATION IS NORMAL- NO INTERVENTION    HENT:   Head: Normocephalic and atraumatic  Eyes: Pupils are equal, round, and reactive to light  Conjunctivae and EOM are normal  Right eye exhibits no discharge  Left eye exhibits no discharge  No scleral icterus  MM PINK   Neck: Normal range of motion  Neck supple  No JVD present  No tracheal deviation present  No thyromegaly present  NO PMT C/T/L/S SPINE   Cardiovascular: Normal rate, regular rhythm and intact distal pulses  Exam reveals no gallop and no friction rub  Murmur heard  Pulmonary/Chest: Effort normal and breath sounds normal  No stridor  No respiratory distress  He has no wheezes  He has no rales  He exhibits no tenderness  Abdominal: Soft  Bowel sounds are normal  He exhibits no distension and no mass  There is no tenderness  There is no rebound and no guarding  No hernia  SOFT NT/ND- NO PERITONEAL SIGNS- NO CVA TENDERNESS- NO PULSATILE ABD MASS/BRUIT   Musculoskeletal: Normal range of motion  He exhibits edema  He exhibits no tenderness or deformity  EQUAL BILATERAL RADIAL/DP PULSES-1 PLUS BLE PRETIBIAL EDEMA- NT- NO ASYM/ EERYTHEMA   Lymphadenopathy:     He has no cervical adenopathy  Neurological: He is alert and oriented to person, place, and time  No cranial nerve deficit or sensory deficit  He exhibits normal muscle tone  Coordination normal    Skin: Skin is warm  Capillary refill takes less than 2 seconds  No rash noted  He is not diaphoretic  No erythema  No pallor  Psychiatric: He has a normal mood and affect  His behavior is normal    Nursing note and vitals reviewed        Vital Signs  ED Triage Vitals [08/23/19 1149]   Temperature Pulse Respirations Blood Pressure SpO2   97 6 °F (36 4 °C) 75 16 (!) 171/86 95 %      Temp Source Heart Rate Source Patient Position - Orthostatic VS BP Location FiO2 (%)   Oral Monitor Lying Right arm --      Pain Score       7           Vitals: 08/23/19 1149 08/23/19 1421   BP: (!) 171/86 156/81   Pulse: 75 68   Patient Position - Orthostatic VS: Lying Sitting         Visual Acuity      ED Medications  Medications - No data to display    Diagnostic Studies  Results Reviewed     Procedure Component Value Units Date/Time    Stool Enteric Bacterial Panel by PCR [[id number]]  (Normal) Collected:  08/23/19 1313    Lab Status:  Final result Specimen:  Stool from Per Rectum Updated:  08/24/19 0410     Salmonella sp PCR None Detected     Shigella sp/Enteroinvasive E  coli (EIEC) PCR None Detected     Campylobacter sp (jejuni and coli) PCR None Detected     Shiga toxin 1/Shiga toxin 2 genes PCR None Detected    Clostridium difficile toxin by PCR [[id number]]  (Normal) Collected:  08/23/19 1314    Lab Status:  Final result Specimen:  Stool from Per Rectum Updated:  08/24/19 0359     C difficile toxin by PCR NEGATIVE for C difficle toxin by PCR       Comprehensive metabolic panel [[id number]]  (Abnormal) Collected:  08/23/19 1313    Lab Status:  Final result Specimen:  Blood from Arm, Right Updated:  08/23/19 1338     Sodium 139 mmol/L      Potassium 4 5 mmol/L      Chloride 107 mmol/L      CO2 25 mmol/L      ANION GAP 7 mmol/L      BUN 38 mg/dL      Creatinine 3 07 mg/dL      Glucose 112 mg/dL      Calcium 8 7 mg/dL      AST 13 U/L      ALT 20 U/L      Alkaline Phosphatase 179 U/L      Total Protein 7 1 g/dL      Albumin 3 1 g/dL      Total Bilirubin 0 60 mg/dL      eGFR 21 ml/min/1 73sq m     Narrative:       National Kidney Disease Foundation guidelines for Chronic Kidney Disease (CKD):     Stage 1 with normal or high GFR (GFR > 90 mL/min/1 73 square meters)    Stage 2 Mild CKD (GFR = 60-89 mL/min/1 73 square meters)    Stage 3A Moderate CKD (GFR = 45-59 mL/min/1 73 square meters)    Stage 3B Moderate CKD (GFR = 30-44 mL/min/1 73 square meters)    Stage 4 Severe CKD (GFR = 15-29 mL/min/1 73 square meters)    Stage 5 End Stage CKD (GFR <15 mL/min/1 73 square meters)  Note: GFR calculation is accurate only with a steady state creatinine    CBC and differential [[id number]] Collected:  08/23/19 1313    Lab Status:  Final result Specimen:  Blood from Arm, Right Updated:  08/23/19 1325     WBC 7 41 Thousand/uL      RBC 4 36 Million/uL      Hemoglobin 12 4 g/dL      Hematocrit 38 9 %      MCV 89 fL      MCH 28 4 pg      MCHC 31 9 g/dL      RDW 13 4 %      MPV 10 6 fL      Platelets 052 Thousands/uL      nRBC 0 /100 WBCs      Neutrophils Relative 68 %      Immat GRANS % 1 %      Lymphocytes Relative 19 %      Monocytes Relative 10 %      Eosinophils Relative 2 %      Basophils Relative 0 %      Neutrophils Absolute 5 14 Thousands/µL      Immature Grans Absolute 0 04 Thousand/uL      Lymphocytes Absolute 1 38 Thousands/µL      Monocytes Absolute 0 71 Thousand/µL      Eosinophils Absolute 0 12 Thousand/µL      Basophils Absolute 0 02 Thousands/µL                  XR pelvis ap only 1 or 2 views   Final Result by Willem Duong MD (08/23 1533)      No acute osseous abnormality  Workstation performed: [id number]                    Procedures  Procedures       ED Course  ED Course as of Aug 26 1543   Fri Aug 23, 2019   1207 ER MD NOTE- ANO-RECTAL EXAM-- NORMAL ANAL EXAM -- HEME NEG BROWN STOOL SCANT IN VAULT      1341 PELVIS XRAY- NO FX       1342 ER MD NOTE- COMPARED TO RECENT LAB S- NO SIGN CHAGNES      1512 - ER MD NOTE- PT TOLERATED PO CHALLENGE AND AMBULATION WITH NO COMPLAINTS PRIOR TO ER D/C- FEELS IMPROVED                                  MDM    Disposition  Final diagnoses:   Diarrhea, unspecified type   Fall, initial encounter     Time reflects when diagnosis was documented in both MDM as applicable and the Disposition within this note     Time User Action Codes Description Comment    8/23/2019  3:13 PM Mady PRITCHARD Add [R19 7] Diarrhea, unspecified type     8/23/2019  3:13 PM Gilma Jalloh Add [D07  XXXA] Fall, initial encounter       ED Disposition     ED Disposition Condition Date/Time Comment    Discharge Stable Fri Aug 23, 2019  3:16  [facility]int [facility] discharge to home/self care  Follow-up Information    None         Discharge Medication List as of 8/23/2019  3:16 PM      CONTINUE these medications which have NOT CHANGED    Details   amLODIPine (NORVASC) 5 mg tablet Take 1 tablet (5 mg total) by mouth every 12 (twelve) hours, Starting Mon 6/10/2019, Normal      aspirin (ECOTRIN LOW STRENGTH) 81 mg EC tablet Take 81 mg by mouth daily Resume on 8/14, Historical Med      Blood Glucose Monitoring Suppl (ONE TOUCH ULTRA MINI) w/Device KIT by Does not apply route 3 (three) times a day, Starting Tue 11/27/2018, Normal      Blood Pressure Monitoring (BLOOD PRESSURE CUFF) MISC Use to check blood pressure before taking blood pressure medication and 1 hour after and follow instructions provided in discharge instructions based on the readings  , Print      carvedilol (COREG) 6 25 mg tablet Take 1 tablet (6 25 mg total) by mouth 2 (two) times a day with meals, Starting Wed 7/10/2019, Normal      Cholecalciferol (VITAMIN D3) 53495 units CAPS TAKE 1 TABLET BY MOUTH ONCE A WEEK, Normal      [employer] VITAMIN B-12 1000 MCG tablet TAKE 1 TABLET BY MOUTH EVERY DAY, Normal      diphenoxylate-atropine (LOMOTIL) 2 5-0 025 mg per tablet Take 1 tablet by mouth 4 (four) times a day as needed for diarrhea, Historical Med      famotidine (PEPCID) 20 mg tablet Take 20 mg by mouth daily Resume on 8/14, Historical Med      gabapentin (NEURONTIN) 250 mg/5 mL solution Take 12 mL (600 mg total) by mouth daily at bedtime, Starting Thu 3/7/2019, Normal      glucose 4 g chewable tablet Chew 3 tablets (12 g total) as needed for low blood sugar, Starting Wed 8/15/2018, Normal      glucose blood (ONE TOUCH ULTRA TEST) test strip 1 each by Other route 3 (three) times a day Use as instructed, Starting Tue 1/29/2019, Normal      Incontinence Supplies (MALE URINAL) MISC by Does not apply route daily, Starting Mon 9/24/2018, Print      Insulin Syringe-Needle U-100 (B-D INS SYR ULTRAFINE  3CC/30G) 30G X 1/2" 0 3 ML MISC by Does not apply route 4 (four) times a day, Starting Fri 10/26/2018, Normal      Insulin Syringe-Needle U-100 (B-D INS SYRINGE 0 5CC/30GX1/2") 30G X 1/2" 0 5 ML MISC Inject under the skin 4 (four) times a day, Starting Wed 8/14/2019, Normal      ONETOUCH DELICA LANCETS 61Z MISC by Does not apply route 3 (three) times a day, Starting Tue 11/27/2018, Normal      SBI/Protein Isolate (ENTERAGAM) 5 g PACK Take by mouth, Historical Med      insulin aspart (NovoLOG) 100 units/mL injection Inject 4 Units under the skin 3 (three) times a day before meals & Scale:150-200 -2 units, 201-250 -4 units, 251-300 -6 units, 301-350 -8 units, >350 -10 units, Starting Wed 8/14/2019, Print      insulin glargine (LANTUS) 100 units/mL subcutaneous injection Inject 20 Units under the skin daily, Starting Wed 7/10/2019, No Print      atorvastatin (LIPITOR) 80 mg tablet Take 1 tablet (80 mg total) by mouth daily with dinner, Starting Mon 6/10/2019, Normal      escitalopram (LEXAPRO) 10 mg tablet Take 1 tablet (10 mg total) by mouth daily at bedtime, Starting Tue 8/13/2019, Normal      LUCENTIS 0 3 MG/0 05ML Starting Tue 9/11/2018, Historical Med           No discharge procedures on file      ED Provider  Electronically Signed by           Lee Garcia MD  08/26/19 7844

## 2019-08-28 LAB
ATRIAL RATE: 66 BPM
P AXIS: 39 DEGREES
PR INTERVAL: 200 MS
QRS AXIS: 6 DEGREES
QRSD INTERVAL: 108 MS
QT INTERVAL: 452 MS
QTC INTERVAL: 473 MS
T WAVE AXIS: 59 DEGREES
VENTRICULAR RATE: 66 BPM

## 2019-08-28 PROCEDURE — 93010 ELECTROCARDIOGRAM REPORT: CPT | Performed by: INTERNAL MEDICINE

## 2019-08-28 NOTE — PROGRESS NOTES
There are no diagnoses linked to this encounter  Assessment and plan:       1  Lower Urinary Tract Symptoms  - Patient did not tolerate an anticholinergic due to confusion, Myrbetriq is not covered by the patient's insurance  - We did discuss that his urinary urgency and frequency may be confounded by his diabetes and history of cerebrovascular accident  Patient his wife are interested in further treatment options  I recommended having him undergo cystoscopy as well as urodynamic testing to further assess the lower urinary tract anatomy as well as function  - Will return in the near future for cystoscopic evaluation  Gilberto Simms PA-C      Chief Complaint     Chief Complaint   Patient presents with    Urinary Incontinence         History of Present Illness     Corinne Hammonds is a 61 y o  male patient recently seen in consultation for urinary incontinence  Patient his wife reported that he had previously had noted lower urinary tract symptoms but after suffering from a stroke in July of 2018 he has had a lot of urinary frequency, urgency, and incontinence  He reports that he often does not feel the need to urinate  Patient demonstrated excellent bladder emptying with a PVR 57 mL  Unfortunately his AUA symptom score at the time of consultation was 23 with an overall bother score of 6  Patient was prescribed Myrbetriq  Unfortunately, this was not covered by his insurance and he was prescribed trospium  Patient tried the trospium for short amount of time but suffered from confusion on this medication and his wife appropriately discontinued it  He continues to have urinary frequency, urgency, and incontinence requiring a minimum of 3 Depends per day  He is emptying well with a PVR in the office today of 15 mL  AUA symptom score today is 9 with an overall bother score of 5  His score is almost entirely due to frequency and urgency          Laboratory     Lab Results   Component Value Date    CREATININE 2 81 (H) 08/25/2019       No results found for: PSA    Recent Results (from the past 1 hour(s))   POCT Measure PVR    Collection Time: 08/29/19  2:49 PM   Result Value Ref Range    POST-VOID RESIDUAL VOLUME, ML POC 15 mL         Review of Systems     Review of Systems   Constitutional: Negative for chills and fever  HENT: Negative  Eyes: Negative  Respiratory: Negative for shortness of breath  Cardiovascular: Negative for chest pain  Gastrointestinal: Negative for constipation, diarrhea, nausea and vomiting  Genitourinary: Positive for enuresis, frequency and urgency  Negative for difficulty urinating, dysuria, flank pain and hematuria  Musculoskeletal: Negative  AUA SYMPTOM SCORE      Most Recent Value   AUA SYMPTOM SCORE   How often have you had a sensation of not emptying your bladder completely after you finished urinating? 0   How often have you had to urinate again less than two hours after you finished urinating? 3   How often have you found you stopped and started again several times when you urinate?  0   How often have you found it difficult to postpone urination? 5   How often have you had a weak urinary stream?  0   How often have you had to push or strain to begin urination? 0   How many times did you most typically get up to urinate from the time you went to bed at night until the time you got up in the morning? 1   Quality of Life: If you were to spend the rest of your life with your urinary condition just the way it is now, how would you feel about that?  5   AUA SYMPTOM SCORE  9                  Allergies     No Known Allergies    Physical Exam     Physical Exam   Constitutional: He is oriented to person, place, and time  He appears well-developed and well-nourished  No distress  HENT:   Head: Normocephalic and atraumatic  Right Ear: External ear normal    Left Ear: External ear normal    Nose: Nose normal    Eyes: Right eye exhibits no discharge  Left eye exhibits no discharge  No scleral icterus  Cardiovascular: Normal rate  Pulmonary/Chest: Effort normal    Musculoskeletal:   Ambulates independently   Neurological: He is alert and oriented to person, place, and time  Skin: Skin is warm and dry  He is not diaphoretic  Psychiatric: He has a normal mood and affect  His behavior is normal  Judgment and thought content normal    Nursing note and vitals reviewed  Vital Signs     Vitals:    08/29/19 1452   BP: 128/70   BP Location: Left arm   Patient Position: Sitting   Cuff Size: Adult   Pulse: 80   Weight: 104 kg (229 lb)   Height: 5' 10" (1 778 m)         Current Medications       Current Outpatient Medications:     amLODIPine (NORVASC) 5 mg tablet, Take 1 tablet (5 mg total) by mouth every 12 (twelve) hours, Disp: 180 tablet, Rfl: 1    aspirin (ECOTRIN LOW STRENGTH) 81 mg EC tablet, Take 81 mg by mouth daily Resume on 8/14, Disp: , Rfl:     atorvastatin (LIPITOR) 80 mg tablet, Take 1 tablet (80 mg total) by mouth daily with dinner, Disp: 90 tablet, Rfl: 1    Blood Glucose Monitoring Suppl (ONE TOUCH ULTRA MINI) w/Device KIT, by Does not apply route 3 (three) times a day, Disp: 1 each, Rfl: 0    Blood Pressure Monitoring (BLOOD PRESSURE CUFF) MISC, Use to check blood pressure before taking blood pressure medication and 1 hour after and follow instructions provided in discharge instructions based on the readings  , Disp: 1 each, Rfl: 0    carvedilol (COREG) 6 25 mg tablet, Take 1 tablet (6 25 mg total) by mouth 2 (two) times a day with meals, Disp: 180 tablet, Rfl: 1    Cholecalciferol (VITAMIN D3) 38921 units CAPS, TAKE 1 TABLET BY MOUTH ONCE A WEEK, Disp: 5 capsule, Rfl: 5    [employer] VITAMIN B-12 1000 MCG tablet, TAKE 1 TABLET BY MOUTH EVERY DAY, Disp: 30 tablet, Rfl: 5    diphenoxylate-atropine (LOMOTIL) 2 5-0 025 mg per tablet, Take 1 tablet by mouth 4 (four) times a day as needed for diarrhea, Disp: , Rfl:     escitalopram (LEXAPRO) 10 mg tablet, Take 1 tablet (10 mg total) by mouth daily at bedtime, Disp: 90 tablet, Rfl: 0    famotidine (PEPCID) 20 mg tablet, Take 20 mg by mouth daily Resume on 8/14, Disp: , Rfl:     gabapentin (NEURONTIN) 250 mg/5 mL solution, Take 12 mL (600 mg total) by mouth daily at bedtime, Disp: 470 mL, Rfl: 2    glucagon (GLUCAGON EMERGENCY) 1 MG injection, Inject 1 mg under the skin once as needed for low blood sugar for up to 1 dose, Disp: 1 kit, Rfl: 2    glucose 4 g chewable tablet, Chew 3 tablets (12 g total) as needed for low blood sugar, Disp: 50 tablet, Rfl: 0    glucose blood (ONE TOUCH ULTRA TEST) test strip, 1 each by Other route 3 (three) times a day Use as instructed, Disp: 270 each, Rfl: 1    Incontinence Supplies (MALE URINAL) MISC, by Does not apply route daily, Disp: 6 each, Rfl: 3    insulin aspart (NovoLOG) 100 units/mL injection, Inject 4 Units under the skin 3 (three) times a day before meals & Scale:150-200 -2 units, 201-250 -4 units, 251-300 -6 units, 301-350 -8 units, >350 -10 units, Disp: 50 mL, Rfl: 0    insulin glargine (LANTUS) 100 units/mL subcutaneous injection, Inject 20 Units under the skin daily, Disp: 10 mL, Rfl: 0    Insulin Syringe-Needle U-100 (B-D INS SYR ULTRAFINE  3CC/30G) 30G X 1/2" 0 3 ML MISC, by Does not apply route 4 (four) times a day, Disp: 360 each, Rfl: 1    Insulin Syringe-Needle U-100 (B-D INS SYRINGE 0 5CC/30GX1/2") 30G X 1/2" 0 5 ML MISC, Inject under the skin 4 (four) times a day, Disp: 360 each, Rfl: 1    LUCENTIS 0 3 MG/0 05ML, , Disp: , Rfl:     ONETOUCH DELICA LANCETS 16T MISC, by Does not apply route 3 (three) times a day, Disp: 270 each, Rfl: 1    SBI/Protein Isolate (ENTERAGAM) 5 g PACK, Take by mouth, Disp: , Rfl:       Active Problems     Patient Active Problem List   Diagnosis    Type 2 diabetes mellitus with hyperglycemia, with long-term current use of insulin (HCC)    Mixed hyperlipidemia    Benign hypertension with CKD (chronic kidney disease) [facility]    Persistent proteinuria    Cerebrovascular accident (CVA) due to thrombosis of left middle cerebral artery (HCC)    Cognitive impairment    Elevated alkaline phosphatase level    Stage 4 chronic kidney disease (HCC)    Diabetic macular edema (HCC)    GERD (gastroesophageal reflux disease)    Cirrhosis (HCC)    Diabetic polyneuropathy associated with type 2 diabetes mellitus (HCC)    Other constipation    Abnormal EEG    History of stroke    TIA (transient ischemic attack)    Stroke-like symptoms, with right-sided weakness    symptomatic hypoglycemia    Cellulitis    Anxiety associated with depression    Hypertension    Hypothermia         Past Medical History     Past Medical History:   Diagnosis Date    Diabetes mellitus ([facility] )     Hypercholesteremia     Hyperlipidemia     Hypertension     Neuropathy     Obesity     [facility]omyelitis [facility] )     last assessed 11/4/16    PVC's (premature ventricular contractions)     sees cardiology Dr Smiley camargo    Stroke [facility])     last weeof July 2018 [place]         Surgical History     Past Surgical History:   Procedure Laterality Date    ABDOMINAL SURGERY      CHOLECYSTECTOMY      Percutaneous    OTHER SURGICAL HISTORY      "stimulator to control bowel movements"    PA ESOPHAGOGASTRODUODENOSCOPY TRANSORAL DIAGNOSTIC N/A 9/27/2016    Procedure: ESOPHAGOGASTRODUODENOSCOPY (EGD); Surgeon: Ladarius Wood MD;  [facility];   Service: Gastroenterology    PA LAP,CHOLECYSTECTOMY N/A 2/29/2016    Procedure: LAPAROSCOPIC CHOLECYSTECTOMY ;  Surgeon: Toribio Curling, DO;  [facility];  Service: General    ROTATOR CUFF REPAIR Right     TOE AMPUTATION Right 10/28/2016    Procedure: 3RD TOE AMPUTATION ;  Surgeon: Gerardo Estrada DPM;  [facility];  Service:          Family History     Family History   Problem Relation Age of Onset    Leukemia Mother     Liver disease Mother     Lung cancer Mother         heavy smoker - 3 ppd    Heart disease Father     Liver disease Father     Multiple myeloma Sister     Breast cancer Sister     Urolithiasis Family     Alcohol abuse Neg Hx     Depression Neg Hx     Drug abuse Neg Hx     Substance Abuse Neg Hx     Mental illness Neg Hx          Social History     Social History       Radiology

## 2019-08-29 ENCOUNTER — TELEPHONE (OUTPATIENT)
Dept: UROLOGY | Facility: CLINIC | Age: 59
End: 2019-08-29

## 2019-08-29 ENCOUNTER — OFFICE VISIT (OUTPATIENT)
Dept: UROLOGY | Facility: CLINIC | Age: 59
End: 2019-08-29
Payer: COMMERCIAL

## 2019-08-29 VITALS
WEIGHT: 229 LBS | SYSTOLIC BLOOD PRESSURE: 128 MMHG | DIASTOLIC BLOOD PRESSURE: 70 MMHG | BODY MASS INDEX: 32.78 KG/M2 | HEART RATE: 80 BPM | HEIGHT: 70 IN

## 2019-08-29 DIAGNOSIS — N39.498 OTHER URINARY INCONTINENCE: Primary | ICD-10-CM

## 2019-08-29 LAB — POST-VOID RESIDUAL VOLUME, ML POC: 15 ML

## 2019-08-29 PROCEDURE — 51798 US URINE CAPACITY MEASURE: CPT | Performed by: PHYSICIAN ASSISTANT

## 2019-08-29 PROCEDURE — 99213 OFFICE O/P EST LOW 20 MIN: CPT | Performed by: PHYSICIAN ASSISTANT

## 2019-08-30 DIAGNOSIS — Z20.7 EXPOSURE TO SCABIES: Primary | ICD-10-CM

## 2019-08-30 RX ORDER — PERMETHRIN 50 MG/G
CREAM TOPICAL ONCE
Qty: 60 G | Refills: 0 | Status: SHIPPED | OUTPATIENT
Start: 2019-08-30 | End: 2019-08-30

## 2019-08-30 NOTE — TELEPHONE ENCOUNTER
Called and spoke with patient's wife  Offered 10/17 for urodynamics testing and wife is unable to make that appt  Offered 10/24 and she accepted, appt for 230 as she works until 2 pm  FU review appt with Dr Dominique Nath scheduled 10/29 at 3pm  She confirmed all appt details

## 2019-08-30 NOTE — PROGRESS NOTES
Patient's wife(also patient of mine) seen in office for pruritic rash, suspected scabies    Plan - permethrin ordered for [place] to use(household contact)

## 2019-08-30 NOTE — TELEPHONE ENCOUNTER
Patient with history of urinary incontinence, urgency and frequency  H/o diabetes and CVA as well  Urodynamics ordered for further evaluation  Patient scheduled for cystoscopy 10/14, UDS to be done thereafter to ensure no anatomical reason for patient's symptoms  Called and left a message for patient to call back to discuss scheduling  Tentatively looking to scheduled 10/17 at 1pm at [place]

## 2019-09-06 DIAGNOSIS — E53.8 B12 DEFICIENCY: ICD-10-CM

## 2019-09-06 RX ORDER — OMEGA-3/DHA/EPA/FISH OIL 35-113.5MG
TABLET,CHEWABLE ORAL
Qty: 90 TABLET | Refills: 1 | Status: SHIPPED | OUTPATIENT
Start: 2019-09-06 | End: 2020-04-01 | Stop reason: SDUPTHER

## 2019-09-07 DIAGNOSIS — E53.8 B12 DEFICIENCY: Primary | ICD-10-CM

## 2019-09-11 ENCOUNTER — TRANSCRIBE ORDERS (OUTPATIENT)
Dept: ADMINISTRATIVE | Facility: HOSPITAL | Age: 59
End: 2019-09-11

## 2019-09-11 DIAGNOSIS — R80.8 OTHER PROTEINURIA: ICD-10-CM

## 2019-09-11 DIAGNOSIS — I10 ESSENTIAL HYPERTENSION, MALIGNANT: ICD-10-CM

## 2019-09-11 DIAGNOSIS — N18.4 CHRONIC KIDNEY DISEASE, STAGE IV (SEVERE) (HCC): Primary | ICD-10-CM

## 2019-09-13 ENCOUNTER — HOSPITAL ENCOUNTER (EMERGENCY)
Facility: HOSPITAL | Age: 59
Discharge: HOME | End: 2019-09-13
Attending: EMERGENCY MEDICINE
Payer: COMMERCIAL

## 2019-09-13 ENCOUNTER — APPOINTMENT (EMERGENCY)
Dept: ULTRASOUND IMAGING | Facility: HOSPITAL | Age: 59
End: 2019-09-13
Payer: COMMERCIAL

## 2019-09-13 VITALS
HEART RATE: 72 BPM | WEIGHT: 234.6 LBS | OXYGEN SATURATION: 97 % | TEMPERATURE: 98.5 F | DIASTOLIC BLOOD PRESSURE: 79 MMHG | SYSTOLIC BLOOD PRESSURE: 171 MMHG | BODY MASS INDEX: 33.66 KG/M2 | RESPIRATION RATE: 18 BRPM

## 2019-09-13 DIAGNOSIS — M79.661 RIGHT CALF PAIN: Primary | ICD-10-CM

## 2019-09-13 LAB
ANION GAP SERPL CALCULATED.3IONS-SCNC: 11 MMOL/L (ref 4–13)
BASOPHILS # BLD AUTO: 0.03 THOUSANDS/ΜL (ref 0–0.1)
BASOPHILS NFR BLD AUTO: 0 % (ref 0–1)
BUN SERPL-MCNC: 49 MG/DL (ref 5–25)
CALCIUM SERPL-MCNC: 8.5 MG/DL (ref 8.3–10.1)
CHLORIDE SERPL-SCNC: 106 MMOL/L (ref 100–108)
CO2 SERPL-SCNC: 24 MMOL/L (ref 21–32)
CREAT SERPL-MCNC: 2.81 MG/DL (ref 0.6–1.3)
EOSINOPHIL # BLD AUTO: 0.25 THOUSAND/ΜL (ref 0–0.61)
EOSINOPHIL NFR BLD AUTO: 3 % (ref 0–6)
ERYTHROCYTE [DISTWIDTH] IN BLOOD BY AUTOMATED COUNT: 13.6 % (ref 11.6–15.1)
GFR SERPL CREATININE-BSD FRML MDRD: 24 ML/MIN/1.73SQ M
GLUCOSE SERPL-MCNC: 129 MG/DL (ref 65–140)
HCT VFR BLD AUTO: 41.3 % (ref 36.5–49.3)
HGB BLD-MCNC: 13.2 G/DL (ref 12–17)
IMM GRANULOCYTES # BLD AUTO: 0.04 THOUSAND/UL (ref 0–0.2)
IMM GRANULOCYTES NFR BLD AUTO: 1 % (ref 0–2)
LYMPHOCYTES # BLD AUTO: 1.83 THOUSANDS/ΜL (ref 0.6–4.47)
LYMPHOCYTES NFR BLD AUTO: 23 % (ref 14–44)
MAGNESIUM SERPL-MCNC: 2 MG/DL (ref 1.6–2.6)
MCH RBC QN AUTO: 28.5 PG (ref 26.8–34.3)
MCHC RBC AUTO-ENTMCNC: 32 G/DL (ref 31.4–37.4)
MCV RBC AUTO: 89 FL (ref 82–98)
MONOCYTES # BLD AUTO: 0.71 THOUSAND/ΜL (ref 0.17–1.22)
MONOCYTES NFR BLD AUTO: 9 % (ref 4–12)
NEUTROPHILS # BLD AUTO: 5.05 THOUSANDS/ΜL (ref 1.85–7.62)
NEUTS SEG NFR BLD AUTO: 64 % (ref 43–75)
NRBC BLD AUTO-RTO: 0 /100 WBCS
PLATELET # BLD AUTO: 173 THOUSANDS/UL (ref 149–390)
PMV BLD AUTO: 10.6 FL (ref 8.9–12.7)
POTASSIUM SERPL-SCNC: 4.5 MMOL/L (ref 3.5–5.3)
RBC # BLD AUTO: 4.63 MILLION/UL (ref 3.88–5.62)
SODIUM SERPL-SCNC: 141 MMOL/L (ref 136–145)
WBC # BLD AUTO: 7.91 THOUSAND/UL (ref 4.31–10.16)

## 2019-09-13 PROCEDURE — 85025 COMPLETE CBC W/AUTO DIFF WBC: CPT | Performed by: EMERGENCY MEDICINE

## 2019-09-13 PROCEDURE — 93971 EXTREMITY STUDY: CPT

## 2019-09-13 PROCEDURE — 83735 ASSAY OF MAGNESIUM: CPT | Performed by: EMERGENCY MEDICINE

## 2019-09-13 PROCEDURE — 99285 EMERGENCY DEPT VISIT HI MDM: CPT | Performed by: EMERGENCY MEDICINE

## 2019-09-13 PROCEDURE — 99284 EMERGENCY DEPT VISIT MOD MDM: CPT

## 2019-09-13 PROCEDURE — 80048 BASIC METABOLIC PNL TOTAL CA: CPT | Performed by: EMERGENCY MEDICINE

## 2019-09-13 PROCEDURE — 36415 COLL VENOUS BLD VENIPUNCTURE: CPT | Performed by: EMERGENCY MEDICINE

## 2019-09-13 RX ORDER — ACETAMINOPHEN 325 MG/1
650 TABLET ORAL ONCE
Status: COMPLETED | OUTPATIENT
Start: 2019-09-13 | End: 2019-09-13

## 2019-09-13 RX ORDER — ACETAMINOPHEN 500 MG
500 TABLET ORAL EVERY 6 HOURS PRN
Qty: 20 TABLET | Refills: 0 | Status: SHIPPED | OUTPATIENT
Start: 2019-09-13 | End: 2019-09-18

## 2019-09-13 RX ORDER — ACETAMINOPHEN 325 MG/1
650 TABLET ORAL EVERY 6 HOURS PRN
Status: DISCONTINUED | OUTPATIENT
Start: 2019-09-13 | End: 2019-09-13

## 2019-09-13 RX ADMIN — ACETAMINOPHEN 650 MG: 325 TABLET ORAL at 19:17

## 2019-09-13 NOTE — ED PROVIDER NOTES
History  Chief Complaint   Patient presents with    Leg Pain     PT presents to ED with right lower leg pain, that started last night and has worsened  PT denies injury     Patient is a 59-year-old male with diabetes, peripheral neuropathy, hypertension, hyperlipidemia and history of CVA  He presents today with pain in his right calf since last night  He 1st noticed this when he woke up last night to use the restroom, and had constant pain in his right calf  Pain settled this morning until 3:00 p m  When he re experienced the right calf pain  It is 8/10, constant, with no radiation  No aggravating or relieving factors  He denies fever  He has noticed swelling in his right calf since this afternoon as well  He does not have a recent history of long distance travel, prolonged immobilization, or DVT in the past   There is no shortness of breath, palpitations, or chest pain  He states that he experiences frequent muscle cramps  History provided by:  Patient and spouse   used: No        Prior to Admission Medications   Prescriptions Last Dose Informant Patient Reported? Taking? Blood Glucose Monitoring Suppl (ONE TOUCH ULTRA MINI) w/Device KIT  Spouse/Significant Other No No   Sig: by Does not apply route 3 (three) times a day   Blood Pressure Monitoring (BLOOD PRESSURE CUFF) MISC  Spouse/Significant Other No No   Sig: Use to check blood pressure before taking blood pressure medication and 1 hour after and follow instructions provided in discharge instructions based on the readings     [facility] VITAMIN B-12 1000 MCG tablet 9/13/2019 at Unknown time  No Yes   Sig: TAKE 1 TABLET BY MOUTH EVERY DAY   Cholecalciferol (VITAMIN D3) 36683 units CAPS 9/8/2019 Spouse/Significant Other No Yes   Sig: TAKE 1 TABLET BY MOUTH ONCE A WEEK   Incontinence Supplies (MALE URINAL) MISC  Spouse/Significant Other No No   Sig: by Does not apply route daily   Insulin Syringe-Needle U-100 (B-D INS SYR ULTRAFINE  3CC/30G) 30G X 1/2" 0 3 ML MISC  Spouse/Significant Other No No   Sig: by Does not apply route 4 (four) times a day   Insulin Syringe-Needle U-100 (B-D INS SYRINGE 0 5CC/30GX1/2") 30G X 1/2" 0 5 ML MISC  Spouse/Significant Other No No   Sig: Inject under the skin 4 (four) times a day   LUCENTIS 0 3 MG/0 05ML  Spouse/Significant Other Yes No   ONETOUCH DELICA LANCETS 62L MISC  Spouse/Significant Other No No   Sig: by Does not apply route 3 (three) times a day   SBI/Protein Isolate (ENTERAGAM) 5 g PACK  Spouse/Significant Other Yes No   Sig: Take by mouth   amLODIPine (NORVASC) 5 mg tablet 9/13/2019 at Unknown time Spouse/Significant Other No Yes   Sig: Take 1 tablet (5 mg total) by mouth every 12 (twelve) hours   aspirin (ECOTRIN LOW STRENGTH) 81 mg EC tablet 9/13/2019 at Unknown time Spouse/Significant Other Yes Yes   Sig: Take 81 mg by mouth daily Resume on 8/14   atorvastatin (LIPITOR) 80 mg tablet 9/12/2019 at Unknown time Spouse/Significant Other No Yes   Sig: Take 1 tablet (80 mg total) by mouth daily with dinner   carvedilol (COREG) 6 25 mg tablet 9/13/2019 at Unknown time Spouse/Significant Other No Yes   Sig: Take 1 tablet (6 25 mg total) by mouth 2 (two) times a day with meals   diphenoxylate-atropine (LOMOTIL) 2 5-0 025 mg per tablet  Spouse/Significant Other Yes Yes   Sig: Take 1 tablet by mouth 4 (four) times a day as needed for diarrhea   escitalopram (LEXAPRO) 10 mg tablet 9/12/2019 at Unknown time Spouse/Significant Other No Yes   Sig: Take 1 tablet (10 mg total) by mouth daily at bedtime   famotidine (PEPCID) 20 mg tablet 9/13/2019 at Unknown time Spouse/Significant Other Yes Yes   Sig: Take 20 mg by mouth daily Resume on 8/14   gabapentin (NEURONTIN) 250 mg/5 mL solution 9/13/2019 at Unknown time Spouse/Significant Other No Yes   Sig: Take 12 mL (600 mg total) by mouth daily at bedtime   glucagon (GLUCAGON EMERGENCY) 1 MG injection  Spouse/Significant Other No No   Sig: Inject 1 mg under the skin once as needed for low blood sugar for up to 1 dose   glucose 4 g chewable tablet  Spouse/Significant Other No No   Sig: Chew 3 tablets (12 g total) as needed for low blood sugar   glucose blood (ONE TOUCH ULTRA TEST) test strip  Spouse/Significant Other No No   Si each by Other route 3 (three) times a day Use as instructed   insulin aspart (NovoLOG) 100 units/mL injection 2019 at Unknown time Spouse/Significant Other No Yes   Sig: Inject 4 Units under the skin 3 (three) times a day before meals & Scale:150-200 -2 units, 201-250 -4 units, 251-300 -6 units, 301-350 -8 units, >350 -10 units   insulin glargine (LANTUS) 100 units/mL subcutaneous injection 2019 at Unknown time Spouse/Significant Other No Yes   Sig: Inject 20 Units under the skin daily      Facility-Administered Medications: None       Past Medical History:   Diagnosis Date    [facility]     Hypercholesteremia     Hyperlipidemia     Hypertension     Neuropathy     Obesity     [facility]omyelitis ([facility] )     last assessed 16    PVC's (premature ventricular contractions)     sees cardiology Dr Claudy camargo    Stroke [facility])     last weeof 2018 [facility]       Past Surgical History:   Procedure Laterality Date    ABDOMINAL SURGERY      CHOLECYSTECTOMY      Percutaneous    OTHER SURGICAL HISTORY      "stimulator to control bowel movements"    IN ESOPHAGOGASTRODUODENOSCOPY TRANSORAL DIAGNOSTIC N/A 2016    Procedure: ESOPHAGOGASTRODUODENOSCOPY (EGD); Surgeon: Kam Brandt MD;  [facility];   Service: Gastroenterology    IN LAP,CHOLECYSTECTOMY N/A 2016    Procedure: LAPAROSCOPIC CHOLECYSTECTOMY ;  Surgeon: Pantera Castro DO;  [facility];  Service: General    ROTATOR CUFF REPAIR Right     TOE AMPUTATION Right 10/28/2016    Procedure: 3RD TOE AMPUTATION ;  Surgeon: Roz Rucker DPM;  [facility];  Service:        Family History   Problem Relation Age of Onset    Leukemia Mother     Liver disease Mother     Lung cancer Mother         heavy smoker - 3 ppd    Heart disease Father     Liver disease Father     Multiple myeloma Sister     Breast cancer Sister     Urolithiasis Family     Alcohol abuse Neg Hx     Depression Neg Hx     Drug abuse Neg Hx     Substance Abuse Neg Hx     Mental illness Neg Hx      I have reviewed and agree with the history as documented  Social History     Tobacco Use    Smoking status: Never Smoker    Smokeless tobacco: Never Used   Substance Use Topics    Alcohol use: No    Drug use: No        Review of Systems   Constitutional: Negative  Negative for chills, diaphoresis, fatigue and fever  HENT: Negative  Eyes: Negative  Negative for photophobia and visual disturbance  Respiratory: Negative  Negative for cough, chest tightness, shortness of breath and wheezing  Cardiovascular: Negative for chest pain, palpitations and leg swelling  Gastrointestinal: Negative  Negative for abdominal distention, abdominal pain, diarrhea, nausea and vomiting  Endocrine: Positive for polyuria  Genitourinary: Negative  Negative for dysuria, flank pain, frequency, hematuria and urgency  Musculoskeletal: Positive for myalgias  Negative for arthralgias and joint swelling  Skin: Negative  Negative for rash  Allergic/Immunologic: Negative  Neurological: Positive for numbness  Negative for dizziness, weakness and headaches  Facial asymmetry: Peripheral neuropathy  Hematological: Negative  All other systems reviewed and are negative        Physical Exam  ED Triage Vitals [09/13/19 1804]   Temperature Pulse Respirations Blood Pressure SpO2   98 5 °F (36 9 °C) 76 18 (!) 182/81 98 %      Temp Source Heart Rate Source Patient Position - Orthostatic VS BP Location FiO2 (%)   Oral Monitor Sitting Left arm --      Pain Score       8             Orthostatic Vital Signs  Vitals:    09/13/19 1804 09/13/19 2029   BP: (!) 182/81 (!) 171/79   Pulse: 76 72   Patient Position - Orthostatic VS: Sitting Sitting       Physical Exam   Constitutional: He is oriented to person, place, and time  He appears well-developed and well-nourished  No distress  HENT:   Head: Normocephalic and atraumatic  Mouth/Throat: Oropharynx is clear and moist    Eyes: Pupils are equal, round, and reactive to light  Conjunctivae and EOM are normal    Neck: Normal range of motion  Neck supple  Cardiovascular: Normal rate, regular rhythm, normal heart sounds and intact distal pulses  No murmur heard  Pulmonary/Chest: Effort normal and breath sounds normal  No stridor  He has no wheezes  He has no rales  Abdominal: Soft  Bowel sounds are normal  He exhibits no distension  There is no tenderness  Musculoskeletal:   Bilateral calves appear symmetric, no erythema noted on the right calf  He exhibits tenderness on B/L calves (R>L), no pulsatile mass, distal pulses palpable  Lymphadenopathy:     He has no cervical adenopathy  Neurological: He is alert and oriented to person, place, and time  A sensory deficit (peripheral neuropathy) is present  Skin: Skin is warm  Capillary refill takes less than 2 seconds  No rash noted  He is not diaphoretic  Nursing note and vitals reviewed        ED Medications  Medications   acetaminophen (TYLENOL) tablet 650 mg (650 mg Oral Given 9/13/19 1917)       Diagnostic Studies  Results Reviewed     Procedure Component Value Units Date/Time    Basic metabolic panel [[id number]]  (Abnormal) Collected:  09/13/19 1922    Lab Status:  Final result Specimen:  Blood from Arm, Right Updated:  09/13/19 1939     Sodium 141 mmol/L      Potassium 4 5 mmol/L      Chloride 106 mmol/L      CO2 24 mmol/L      ANION GAP 11 mmol/L      BUN 49 mg/dL      Creatinine 2 81 mg/dL      Glucose 129 mg/dL      Calcium 8 5 mg/dL      eGFR 24 ml/min/1 73sq m     Narrative:       [place] guidelines for Chronic Kidney Disease (CKD):      Stage 1 with normal or high GFR (GFR > 90 mL/min/1 73 square meters)    Stage 2 Mild CKD (GFR = 60-89 mL/min/1 73 square meters)    Stage 3A Moderate CKD (GFR = 45-59 mL/min/1 73 square meters)    Stage 3B Moderate CKD (GFR = 30-44 mL/min/1 73 square meters)    Stage 4 Severe CKD (GFR = 15-29 mL/min/1 73 square meters)    Stage 5 End Stage CKD (GFR <15 mL/min/1 73 square meters)  Note: GFR calculation is accurate only with a steady state creatinine    Magnesium [[id number]]  (Normal) Collected:  09/13/19 1922    Lab Status:  Final result Specimen:  Blood from Arm, Right Updated:  09/13/19 1939     Magnesium 2 0 mg/dL     CBC and differential [[id number]] Collected:  09/13/19 1922    Lab Status:  Final result Specimen:  Blood from Arm, Right Updated:  09/13/19 1928     WBC 7 91 Thousand/uL      RBC 4 63 Million/uL      Hemoglobin 13 2 g/dL      Hematocrit 41 3 %      MCV 89 fL      MCH 28 5 pg      MCHC 32 0 g/dL      RDW 13 6 %      MPV 10 6 fL      Platelets 501 Thousands/uL      nRBC 0 /100 WBCs      Neutrophils Relative 64 %      Immat GRANS % 1 %      Lymphocytes Relative 23 %      Monocytes Relative 9 %      Eosinophils Relative 3 %      Basophils Relative 0 %      Neutrophils Absolute 5 05 Thousands/µL      Immature Grans Absolute 0 04 Thousand/uL      Lymphocytes Absolute 1 83 Thousands/µL      Monocytes Absolute 0 71 Thousand/µL      Eosinophils Absolute 0 25 Thousand/µL      Basophils Absolute 0 03 Thousands/µL                  VAS lower limb venous duplex study, unilateral/limited    (Results Pending)         Procedures  Procedures        ED Course  ED Course as of Sep 13 2246   Fri Sep 13, 2019   1959 Creatinine(!): 2 81   2000 BUN(!): 49   2000 Creatinine 2 81  (unchanged from baseline)      2036 Discussed with the ultrasound technician, negative for DVT                                  MDM    Disposition  Final diagnoses:   Right calf pain     Time reflects when diagnosis was documented in both MDM as applicable and the Disposition within this note     Time User Action Codes Description Comment    9/13/2019  8:55 PM Patsy Hayes Add [Z41 421] Right calf pain     9/13/2019  8:55 PM Patsy Hayes Modify [N21 957] Right calf pain       ED Disposition     ED Disposition Condition Date/Time Comment    Discharge Stable Fri Sep 13, 2019  8:37  [facility] discharge to home/self care  Follow-up Information     Follow up With Specialties Details Why Vincenzo 124, DO Internal Medicine In 2 weeks  [address]  [phone]            Discharge Medication List as of 9/13/2019  8:55 PM      START taking these medications    Details   acetaminophen (TYLENOL) 500 mg tablet Take 1 tablet (500 mg total) by mouth every 6 (six) hours as needed for mild pain for up to 5 days, Starting Fri 9/13/2019, Until Wed 9/18/2019, Normal         CONTINUE these medications which have NOT CHANGED    Details   amLODIPine (NORVASC) 5 mg tablet Take 1 tablet (5 mg total) by mouth every 12 (twelve) hours, Starting Mon 6/10/2019, Normal      aspirin (ECOTRIN LOW STRENGTH) 81 mg EC tablet Take 81 mg by mouth daily Resume on 8/14, Historical Med      atorvastatin (LIPITOR) 80 mg tablet Take 1 tablet (80 mg total) by mouth daily with dinner, Starting Mon 6/10/2019, Normal      carvedilol (COREG) 6 25 mg tablet Take 1 tablet (6 25 mg total) by mouth 2 (two) times a day with meals, Starting Wed 7/10/2019, Normal      Cholecalciferol (VITAMIN D3) 82135 units CAPS TAKE 1 TABLET BY MOUTH ONCE A WEEK, Normal      [facility] VITAMIN B-12 1000 MCG tablet TAKE 1 TABLET BY MOUTH EVERY DAY, Normal      diphenoxylate-atropine (LOMOTIL) 2 5-0 025 mg per tablet Take 1 tablet by mouth 4 (four) times a day as needed for diarrhea, Historical Med      escitalopram (LEXAPRO) 10 mg tablet Take 1 tablet (10 mg total) by mouth daily at bedtime, Starting Tue 8/13/2019, Normal      famotidine (PEPCID) 20 mg tablet Take 20 mg by mouth daily Resume on 8/14, Historical Med      gabapentin (NEURONTIN) 250 mg/5 mL solution Take 12 mL (600 mg total) by mouth daily at bedtime, Starting Thu 3/7/2019, Normal      insulin aspart (NovoLOG) 100 units/mL injection Inject 4 Units under the skin 3 (three) times a day before meals & Scale:150-200 -2 units, 201-250 -4 units, 251-300 -6 units, 301-350 -8 units, >350 -10 units, Starting Sun 8/25/2019, Print      insulin glargine (LANTUS) 100 units/mL subcutaneous injection Inject 20 Units under the skin daily, Starting Sun 8/25/2019, Print      Blood Glucose Monitoring Suppl (ONE TOUCH ULTRA MINI) w/Device KIT by Does not apply route 3 (three) times a day, Starting Tue 11/27/2018, Normal      Blood Pressure Monitoring (BLOOD PRESSURE CUFF) MISC Use to check blood pressure before taking blood pressure medication and 1 hour after and follow instructions provided in discharge instructions based on the readings  , Print      glucagon (GLUCAGON EMERGENCY) 1 MG injection Inject 1 mg under the skin once as needed for low blood sugar for up to 1 dose, Starting Sun 8/25/2019, Print      glucose 4 g chewable tablet Chew 3 tablets (12 g total) as needed for low blood sugar, Starting Wed 8/15/2018, Normal      glucose blood (ONE TOUCH ULTRA TEST) test strip 1 each by Other route 3 (three) times a day Use as instructed, Starting Tue 1/29/2019, Normal      Incontinence Supplies (MALE URINAL) MISC by Does not apply route daily, Starting Mon 9/24/2018, Print      Insulin Syringe-Needle U-100 (B-D INS SYR ULTRAFINE  3CC/30G) 30G X 1/2" 0 3 ML MISC by Does not apply route 4 (four) times a day, Starting Fri 10/26/2018, Normal      Insulin Syringe-Needle U-100 (B-D INS SYRINGE 0 5CC/30GX1/2") 30G X 1/2" 0 5 ML MISC Inject under the skin 4 (four) times a day, Starting Wed 8/14/2019, Normal      LUCENTIS 0 3 MG/0 05ML Starting Tue 9/11/2018, Historical Med      ONETOUCH DELICA LANCETS 18N MISC by Does not apply route 3 (three) times a day, Starting Tue 11/27/2018, Normal      SBI/Protein Isolate (ENTERAGAM) 5 g PACK Take by mouth, Historical Med           No discharge procedures on file  ED Provider  Attending physically available and evaluated Shana Soliz I managed the patient along with the ED Attending      Electronically Signed by         Tristan Oneal MD  09/13/19 3686

## 2019-09-14 PROCEDURE — 93971 EXTREMITY STUDY: CPT | Performed by: SURGERY

## 2019-09-14 NOTE — DISCHARGE INSTRUCTIONS
Take tylenol as needed (no more than every 6 hours) for pain  Follow up with your family practitioner within 2 weeks  If you develop redness in your leg, with fever, or shortness of breath, return to the ED

## 2019-09-15 NOTE — PROGRESS NOTES
Consultation - Cardiology Office  [facility]  Stephania Ortiz 61 y o  male MRN: [id number]  : 1960  Unit/Bed#:  Encounter: [id number]      Assessment:     1  Benign hypertension with CKD (chronic kidney disease) stage IV ([facility] )    2  Exertional shortness of breath    3  Cerebrovascular accident (CVA) due to thrombosis of left middle cerebral artery (HCC)    4  Stage 4 chronic kidney disease ([facility] )    5  Mixed hyperlipidemia    6  History of stroke    7  Type 2 diabetes mellitus with hyperglycemia, with long-term current use of insulin ([facility] )        Discussion summary and Plan:    1  Recent episode of syncope  secondary to hypoglycemia   Patient got higher dose of insulin as per wife  Currently doing much better  Management as per Endocrinology    2  Exertion shortness of breath  Partially due to deconditioning but patient has multiple cardiac risk factor including history of diabetes mellitus, hypertension, dyslipidemia and previous history of stroke  He will be scheduled for Lexiscan stress test   Echo from [facility] reviewed  3  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  On medical Rx  Patient is able to ambulate and has recovered function but memory is bad as per wife  4  Dyslipidemia  He is on high intensity statin  Follow up LFT closely      5  Essential hypertension  Pretty well controlled with Coreg lisinopril and amlodipine  Blood pressure is acceptable  Labs from 2018 reviewed  Patient's creatinine is 2 8    6  CKD stage 4  Follow up with Nephrology  Creatinine is slowly getting worse  Monitor electrolyte closely  Particularly as patient is also on lisinopril  7  Incontinent  Could be due to dysautonomia or neurogenic bladder  Patient is scheduled to see Urology  May need procedure  He will need clearance for urological procedure  All issues discussed with patient and patient's wife at length    Further plan as also stress test become available  Thank you for your consultation  If you have any question please call me at [phone]    Counseling :  A description of the counseling  Goals and Barriers  Patient's ability to self care: Yes  Medication side effect reviewed with patient in detail and all their questions answered to their satisfaction  Primary Care Physician Requesting Consult: Giancarlo Brambila DO    Reason for Consult / Principal Problem:  Cardiac evaluation, recent hypoglycemia  HPI :     Michael Rosen is a 61y o  year old male who was was initially seen by us many years ago was recently [facility] with near syncopal episode  Patient has past medical significant for chronic kidney disease, diabetes mellitus, diarrhea, history of stroke who presents with unresponsive episode  Patient was going to flea market in his car with his family, he was not driving  Family noted him to be very sweaty and pale, and became unresponsive  Patient was brought to the emergency department and noted to have blood glucose of 40  Patient is not doing well  He denies any new complaints  His echo from [facility] Comment reviewed  During workup he was found to have CKD stage 3/4  He now follows up with [facility]  He is doing well  His kidney function has stabilized  He does occasionally get short of breath when he walks  After his stroke is not that active  He is having lot of problem with his urine incontinent  He is scheduled to have a procedure done by urology  He may need clearance for that procedure  Review of Systems   Constitutional: Positive for fatigue  Negative for activity change, chills, diaphoresis, fever and unexpected weight change  HENT: Negative for congestion  Eyes: Negative for discharge and redness  Respiratory: Positive for shortness of breath  Negative for cough, chest tightness and wheezing  Mostly exertional   Cardiovascular: Negative    Negative for chest pain, palpitations and leg swelling  Gastrointestinal: Negative for abdominal pain, diarrhea and nausea  Endocrine: Negative  Episode of hypoglycemia   Genitourinary: Negative for decreased urine volume and urgency  Musculoskeletal: Positive for back pain  Negative for arthralgias and gait problem  Skin: Negative for rash and wound  Allergic/Immunologic: Negative  Neurological: Negative for dizziness, seizures, syncope, weakness, light-headedness and headaches  Status post stroke   Hematological: Negative  Psychiatric/Behavioral: Negative for agitation and confusion  The patient is nervous/anxious  Historical Information   Past Medical History:   Diagnosis Date    Diabetes mellitus ([facility] )     Hypercholesteremia     Hyperlipidemia     Hypertension     Neuropathy     Obesity     Osteomyelitis ([facility] )     last assessed 11/4/16    PVC's (premature ventricular contractions)     sees cardiology Dr Smiley Londono camargo    Stroke [facility])     last weeof July 2018 [address] [place] [address]     Past Surgical History:   Procedure Laterality Date    ABDOMINAL SURGERY      CHOLECYSTECTOMY      Percutaneous    OTHER SURGICAL HISTORY      "stimulator to control bowel movements"    [place] ESOPHAGOGASTRODUODENOSCOPY TRANSORAL DIAGNOSTIC N/A 9/27/2016    Procedure: ESOPHAGOGASTRODUODENOSCOPY (EGD); Surgeon: Ladarius Wood MD;  Location: [facility];   Service: Gastroenterology    [place] LAP,CHOLECYSTECTOMY N/A 2/29/2016    Procedure: LAPAROSCOPIC CHOLECYSTECTOMY ;  Surgeon: Toribio Curling, DO;  Location: [facility];  Service: General    ROTATOR CUFF REPAIR Right     TOE AMPUTATION Right 10/28/2016    Procedure: 3RD TOE AMPUTATION ;  Surgeon: Gerardo Estrada DPM;  Location[facility];  Service:      Social History     Substance and Sexual Activity   Alcohol Use No     Social History     Substance and Sexual Activity   Drug Use No     Social History     Tobacco Use   Smoking Status Never Smoker Smokeless Tobacco Never Used     Family History:   Family History   Problem Relation Age of Onset    Leukemia Mother     Liver disease Mother     Lung cancer Mother         heavy smoker - 3 ppd    Heart disease Father     Liver disease Father     Multiple myeloma Sister     Breast cancer Sister     Urolithiasis Family     Alcohol abuse Neg Hx     Depression Neg Hx     Drug abuse Neg Hx     Substance Abuse Neg Hx     Mental illness Neg Hx        Meds/Allergies     No Known Allergies    Current Outpatient Medications:     amLODIPine (NORVASC) 5 mg tablet, Take 1 tablet (5 mg total) by mouth every 12 (twelve) hours, Disp: 180 tablet, Rfl: 1    aspirin (ECOTRIN LOW STRENGTH) 81 mg EC tablet, Take 81 mg by mouth daily Resume on 8/14, Disp: , Rfl:     atorvastatin (LIPITOR) 80 mg tablet, Take 1 tablet (80 mg total) by mouth daily with dinner, Disp: 90 tablet, Rfl: 1    carvedilol (COREG) 6 25 mg tablet, Take 1 tablet (6 25 mg total) by mouth 2 (two) times a day with meals, Disp: 180 tablet, Rfl: 1    Cholecalciferol (VITAMIN D3) 87415 units CAPS, TAKE 1 TABLET BY MOUTH ONCE A WEEK, Disp: 5 capsule, Rfl: 5    CVS VITAMIN B-12 1000 MCG tablet, TAKE 1 TABLET BY MOUTH EVERY DAY, Disp: 90 tablet, Rfl: 1    diphenoxylate-atropine (LOMOTIL) 2 5-0 025 mg per tablet, Take 1 tablet by mouth 4 (four) times a day as needed for diarrhea, Disp: , Rfl:     escitalopram (LEXAPRO) 10 mg tablet, Take 1 tablet (10 mg total) by mouth daily at bedtime, Disp: 90 tablet, Rfl: 0    famotidine (PEPCID) 20 mg tablet, Take 20 mg by mouth daily Resume on 8/14, Disp: , Rfl:     gabapentin (NEURONTIN) 250 mg/5 mL solution, Take 12 mL (600 mg total) by mouth daily at bedtime, Disp: 470 mL, Rfl: 2    glucagon (GLUCAGON EMERGENCY) 1 MG injection, Inject 1 mg under the skin once as needed for low blood sugar for up to 1 dose, Disp: 1 kit, Rfl: 2    glucose 4 g chewable tablet, Chew 3 tablets (12 g total) as needed for low blood sugar, Disp: 50 tablet, Rfl: 0    glucose blood (ONE TOUCH ULTRA TEST) test strip, 1 each by Other route 3 (three) times a day Use as instructed, Disp: 270 each, Rfl: 1    insulin aspart (NovoLOG) 100 units/mL injection, Inject 4 Units under the skin 3 (three) times a day before meals & Scale:150-200 -2 units, 201-250 -4 units, 251-300 -6 units, 301-350 -8 units, >350 -10 units (Patient taking differently: Inject 5 Units under the skin 3 (three) times a day before meals & Scale:150-200 -2 units, 201-250 -4 units, 251-300 -6 units, 301-350 -8 units, >350 -10 units), Disp: 50 mL, Rfl: 0    insulin glargine (LANTUS) 100 units/mL subcutaneous injection, Inject 20 Units under the skin daily (Patient taking differently: Inject 25 Units under the skin daily ), Disp: 10 mL, Rfl: 0    Insulin Syringe-Needle U-100 (B-D INS SYR ULTRAFINE  3CC/30G) 30G X 1/2" 0 3 ML MISC, by Does not apply route 4 (four) times a day, Disp: 360 each, Rfl: 1    Insulin Syringe-Needle U-100 (B-D INS SYRINGE 0 5CC/30GX1/2") 30G X 1/2" 0 5 ML MISC, Inject under the skin 4 (four) times a day, Disp: 360 each, Rfl: 1    ONETOUCH DELICA LANCETS 40V MISC, by Does not apply route 3 (three) times a day, Disp: 270 each, Rfl: 1    SBI/Protein Isolate (ENTERAGAM) 5 g PACK, Take by mouth, Disp: , Rfl:     acetaminophen (TYLENOL) 500 mg tablet, Take 1 tablet (500 mg total) by mouth every 6 (six) hours as needed for mild pain for up to 5 days, Disp: 20 tablet, Rfl: 0    Blood Glucose Monitoring Suppl (ONE TOUCH ULTRA MINI) w/Device KIT, by Does not apply route 3 (three) times a day, Disp: 1 each, Rfl: 0    Blood Pressure Monitoring (BLOOD PRESSURE CUFF) MISC, Use to check blood pressure before taking blood pressure medication and 1 hour after and follow instructions provided in discharge instructions based on the readings  , Disp: 1 each, Rfl: 0    Incontinence Supplies (MALE URINAL) MISC, by Does not apply route daily (Patient not taking: Reported on 2019), Disp: 6 each, Rfl: 3    LUCENTIS 0 3 MG/0 05ML, , Disp: , Rfl:     Vitals: Blood pressure 140/80, pulse 80, height 5' 10" (1 778 m), weight 108 kg (238 lb 3 2 oz), SpO2 96 %  Body mass index is 34 18 kg/m²  Vitals:    19 1512   Weight: 108 kg (238 lb 3 2 oz)     BP Readings from Last 3 Encounters:   19 140/80   19 (!) 171/79   19 128/70         Physical Exam    Neurologic:  Alert & oriented x 3, no new focal deficits, Not in any acute distress,  Constitutional:  Well developed, well nourished, non-toxic appearance   Eyes:  Pupil equal and reacting to light, conjunctiva normal, No JVP, No LNP   HENT:  Atraumatic, oropharynx moist, Neck- normal range of motion, no tenderness,  Neck supple   Respiratory:  Bilateral air entry, mostly clear to auscultation  Cardiovascular: S1-S2 regular with a 2/6 ejection systolic murmur and S4 is present  GI:  Soft, nondistended, normal bowel sounds, nontender, no hepatosplenomegaly appreciated  Musculoskeletal:  No edema, no tenderness, no deformities  Skin:  Well hydrated, no rash   Lymphatic:  No lymphadenopathy noted   Extremities:  No edema and distal pulses are present    Diagnostic Studies Review Cardio:  Echo reviewed    EKG:  EKG today shows normal sinus rhythm nonspecific ST changes heart rate 80 beats per minute    Cardiac testing:   Results for orders placed during the hospital encounter of 10/28/18   Echo complete with contrast if indicated    Narrative [place] [address], [facility]  [phone]    Transthoracic Echocardiogram  2D, M-mode, Doppler, and Color Doppler    Study date:  29-Oct-2018    Patient: Rolando Khoury  MR number: [id number]  Account number: [de-identified]  : 1960  Age: 62 years  Gender: Male  Status: Inpatient  Location: Bedside  Height: 68 in  Weight: 232 5 lb  BP: 142/ 76 mmHg    Indications: TIA    Diagnoses: G45 9 - Transient cerebral ischemic attack, unspecified    Sonographer:  Stormy Champion RDCS  Primary Physician:  Carly Isaacs DO  Referring Physician:  Veronica Jeter PA-C  Group:  [facility]  Interpreting Physician:  Cinthya Lieberman MD    SUMMARY    LEFT VENTRICLE:  Systolic function was normal  Ejection fraction was estimated to be 55 %  Although no diagnostic regional wall motion abnormality was identified, this possibility cannot be completely excluded on the basis of this study  Wall thickness was increased  Doppler parameters were consistent with abnormal left ventricular relaxation (grade 1 diastolic dysfunction)  AORTIC VALVE:  The valve was functionally bicuspid  Leaflets exhibited moderately increased thickness, moderate calcification, and moderately reduced cuspal separation  The noncoronary cusp appears restricted  There was mild regurgitation  HISTORY: PRIOR HISTORY: DM, Hypertension, Hyperlipidemia, Obesity, PVC's, Stroke    PROCEDURE: The procedure was performed at the bedside  This was a routine study  The transthoracic approach was used  The study included complete 2D imaging, M-mode, complete spectral Doppler, and color Doppler  The heart rate was 72 bpm,  at the start of the study  Images were obtained from the parasternal, apical, subcostal, and suprasternal notch acoustic windows  Echocardiographic views were limited due to decreased penetration and lung interference  This was a  technically difficult study  LEFT VENTRICLE: Size was normal  Systolic function was normal  Ejection fraction was estimated to be 55 %  Although no diagnostic regional wall motion abnormality was identified, this possibility cannot be completely excluded on the basis  of this study  Wall thickness was increased  DOPPLER: There was an increased relative contribution of atrial contraction to ventricular filling  Doppler parameters were consistent with abnormal left ventricular relaxation (grade 1  diastolic dysfunction)      RIGHT VENTRICLE: The size was normal  Systolic function was normal     LEFT ATRIUM: The atrium was mildly dilated  RIGHT ATRIUM: Size was normal     MITRAL VALVE: There was mild to moderate annular calcification  Valve structure was normal  There was mild calcification  There was normal leaflet separation  DOPPLER: There was no evidence for stenosis  There was no significant  regurgitation  AORTIC VALVE: The valve was functionally bicuspid  Leaflets exhibited moderately increased thickness, moderate calcification, and moderately reduced cuspal separation  The noncoronary cusp appears restricted  DOPPLER: There was no evidence  for stenosis  There was mild regurgitation  TRICUSPID VALVE: The valve structure was normal  There was normal leaflet separation  DOPPLER: There was no evidence for stenosis  There was no significant regurgitation  PULMONIC VALVE: Leaflets exhibited normal thickness, no calcification, and normal cuspal separation  DOPPLER: The transpulmonic velocity was within the normal range  There was mild regurgitation  PERICARDIUM: There was no pericardial effusion  The pericardium was normal in appearance  AORTA: The root exhibited normal size  SYSTEMIC VEINS: IVC: The inferior vena cava was normal in size and course   Respirophasic changes were normal     SYSTEM MEASUREMENT TABLES    2D  %FS: 24 %  AV Diam: 3 33 cm  EDV(Teich): 140 07 ml  EF Biplane: 58 38 %  EF(Cube): 56 1 %  EF(Teich): 47 36 %  ESV(Cube): 68 34 ml  ESV(Teich): 73 73 ml  IVSd: 1 24 cm  LA Area: 21 11 cm2  LA Diam: 3 95 cm  LVEDV MOD A2C: 135 47 ml  LVEDV MOD A4C: 174 64 ml  LVEDV MOD BP: 157 66 ml  LVEF MOD A2C: 59 41 %  LVEF MOD A4C: 56 77 %  LVESV MOD A2C: 54 99 ml  LVESV MOD A4C: 75 49 ml  LVESV MOD BP: 65 62 ml  LVIDd: 5 38 cm  LVIDs: 4 09 cm  LVLd A2C: 8 91 cm  LVLd A4C: 9 42 cm  LVLs A2C: 8 03 cm  LVLs A4C: 7 66 cm  LVOT Diam: 2 08 cm  LVPWd: 1 26 cm  RA Area: 18 12 cm2  RV Diam: 3 33 cm  SI(Cube): 40 06 ml/m2  SI(Teich): 30 43 ml/m2  SV MOD A2C: 80 48 ml  SV MOD A4C: 99 15 ml  SV(Cube): 87 33 ml  SV(Teich): 66 34 ml    CW  AR Dec [place]: 1 95 m/s2  AR Dec Time: 2036 6 ms  AR PHT: 590 61 ms  AR Vmax: 3 97 m/s  AR maxP 14 mmHg  AV Env  Ti: 310 54 ms  AV MaxPG: 15 41 mmHg  AV SI: 172 89 ml/m2  AV SV: 376 91 ml  AV VTI: 43 31 cm  AV Vmax: 1 96 m/s  AV Vmean: 1 39 m/s  AV meanP 41 mmHg    MM  TAPSE: 1 75 cm    PW  E': 0 07 m/s  E/E': 10 46  MV A Carlin: 0 93 m/s  MV Dec [place]: 3 63 m/s2  MV DecT: 190 71 ms  MV E Carlin: 0 69 m/s  MV E/A Ratio: 0 75    [employer] Accredited Echocardiography Laboratory    Prepared and electronically signed by    Chikis Ponce MD  Signed 29-Oct-2018 13:16:25         Imaging:  Chest X-Ray:   Xr Chest 2 Views    Result Date: 10/7/2018  Impression No acute cardiopulmonary disease  Workstation performed: [id number]       CT-scan of the chest:     No CTA results available for this patient    Lab Review   Lab Results   Component Value Date    WBC 7 91 2019    HGB 13 2 2019    HCT 41 3 2019    MCV 89 2019    RDW 13 6 2019     2019     BMP:  Lab Results   Component Value Date    SODIUM 141 2019    K 4 5 2019     2019    CO2 24 2019    ANIONGAP 9 2016    BUN 49 (H) 2019    CREATININE 2 81 (H) 2019    GLUC 129 2019    GLUF 200 (H) 2019    CALCIUM 8 5 2019    EGFR 24 2019    MG 2 0 2019     LFT:  Lab Results   Component Value Date    AST 17 2019    ALT 16 2019    ALKPHOS 199 (H) 2019    TP 8 0 2019    ALB 3 5 2019      Lab Results   Component Value Date    [id number] 1 946 2018     Lab Results   Component Value Date    HGBA1C 7 2 (H) 07/10/2019     Lipid Profile:   Lab Results   Component Value Date    CHOLESTEROL 70 10/29/2018    HDL 25 (L) 10/29/2018    LDLCALC 20 10/29/2018    TRIG 125 10/29/2018     Lab Results   Component Value Date    CHOLESTEROL 70 10/29/2018    CHOLESTEROL 166 07/29/2018     Lab Results   Component Value Date    TROPONINI <0 02 07/09/2019     No results found for: NTBNP         Dr Aris Escobar MD [facility]      "This note has been constructed using a voice recognition system  Therefore there may be syntax, spelling, and/or grammatical errors   Please call if you have any questions  "

## 2019-09-18 ENCOUNTER — OFFICE VISIT (OUTPATIENT)
Dept: CARDIOLOGY CLINIC | Facility: CLINIC | Age: 59
End: 2019-09-18
Payer: COMMERCIAL

## 2019-09-18 VITALS
HEART RATE: 80 BPM | DIASTOLIC BLOOD PRESSURE: 80 MMHG | WEIGHT: 238.2 LBS | HEIGHT: 70 IN | BODY MASS INDEX: 34.1 KG/M2 | SYSTOLIC BLOOD PRESSURE: 140 MMHG | OXYGEN SATURATION: 96 %

## 2019-09-18 DIAGNOSIS — I63.312 CEREBROVASCULAR ACCIDENT (CVA) DUE TO THROMBOSIS OF LEFT MIDDLE CEREBRAL ARTERY (HCC): ICD-10-CM

## 2019-09-18 DIAGNOSIS — I12.9 BENIGN HYPERTENSION WITH CKD (CHRONIC KIDNEY DISEASE) STAGE IV (HCC): ICD-10-CM

## 2019-09-18 DIAGNOSIS — N18.4 STAGE 4 CHRONIC KIDNEY DISEASE (HCC): ICD-10-CM

## 2019-09-18 DIAGNOSIS — N18.4 BENIGN HYPERTENSION WITH CKD (CHRONIC KIDNEY DISEASE) STAGE IV (HCC): ICD-10-CM

## 2019-09-18 DIAGNOSIS — E78.2 MIXED HYPERLIPIDEMIA: ICD-10-CM

## 2019-09-18 DIAGNOSIS — Z79.4 TYPE 2 DIABETES MELLITUS WITH HYPERGLYCEMIA, WITH LONG-TERM CURRENT USE OF INSULIN (HCC): ICD-10-CM

## 2019-09-18 DIAGNOSIS — Z86.73 HISTORY OF STROKE: ICD-10-CM

## 2019-09-18 DIAGNOSIS — R06.02 EXERTIONAL SHORTNESS OF BREATH: ICD-10-CM

## 2019-09-18 DIAGNOSIS — E11.65 TYPE 2 DIABETES MELLITUS WITH HYPERGLYCEMIA, WITH LONG-TERM CURRENT USE OF INSULIN (HCC): ICD-10-CM

## 2019-09-18 PROCEDURE — 99244 OFF/OP CNSLTJ NEW/EST MOD 40: CPT | Performed by: INTERNAL MEDICINE

## 2019-09-18 PROCEDURE — 93000 ELECTROCARDIOGRAM COMPLETE: CPT | Performed by: INTERNAL MEDICINE

## 2019-09-21 ENCOUNTER — HOSPITAL ENCOUNTER (OUTPATIENT)
Dept: ULTRASOUND IMAGING | Facility: HOSPITAL | Age: 59
Discharge: HOME | End: 2019-09-21
Payer: COMMERCIAL

## 2019-09-21 DIAGNOSIS — N18.4 CHRONIC KIDNEY DISEASE, STAGE IV (SEVERE) (HCC): ICD-10-CM

## 2019-09-21 DIAGNOSIS — I10 ESSENTIAL HYPERTENSION, MALIGNANT: ICD-10-CM

## 2019-09-21 DIAGNOSIS — R80.8 OTHER PROTEINURIA: ICD-10-CM

## 2019-09-21 PROCEDURE — 76770 US EXAM ABDO BACK WALL COMP: CPT

## 2019-10-01 ENCOUNTER — OFFICE VISIT (OUTPATIENT)
Dept: PODIATRY | Facility: CLINIC | Age: 59
End: 2019-10-01
Payer: COMMERCIAL

## 2019-10-01 VITALS
SYSTOLIC BLOOD PRESSURE: 132 MMHG | BODY MASS INDEX: 33.93 KG/M2 | HEART RATE: 88 BPM | HEIGHT: 70 IN | DIASTOLIC BLOOD PRESSURE: 85 MMHG | WEIGHT: 237 LBS

## 2019-10-01 DIAGNOSIS — E11.42 DIABETIC POLYNEUROPATHY ASSOCIATED WITH TYPE 2 DIABETES MELLITUS (HCC): Primary | ICD-10-CM

## 2019-10-01 DIAGNOSIS — L97.529 ULCER OF LEFT FOOT, UNSPECIFIED ULCER STAGE (HCC): ICD-10-CM

## 2019-10-01 DIAGNOSIS — B35.1 ONYCHOMYCOSIS: ICD-10-CM

## 2019-10-01 PROCEDURE — 11721 DEBRIDE NAIL 6 OR MORE: CPT | Performed by: PODIATRIST

## 2019-10-01 PROCEDURE — 99213 OFFICE O/P EST LOW 20 MIN: CPT | Performed by: PODIATRIST

## 2019-10-01 RX ORDER — MUPIROCIN CALCIUM 20 MG/G
CREAM TOPICAL DAILY
Qty: 30 G | Refills: 0 | Status: SHIPPED | OUTPATIENT
Start: 2019-10-01 | End: 2020-02-03

## 2019-10-01 NOTE — PROGRESS NOTES
PATIENT:  Shara Pretty    1960    ASSESSMENT:     1  Diabetic polyneuropathy associated with type 2 diabetes mellitus ([facility] [address] )     2  Ulcer of left foot, unspecified ulcer stage (HCC)  mupirocin (BACTROBAN) 2 % cream   3  Onychomycosis  Debridement         PLAN:  Patient was counseled on the condition and diagnosis  Educated disease prevention and risks related to diabetes  He has a wound on left foot  Start him on Bactroban cream daily with dry dressing  Start him on surgical shoe  Instructed to identify any signs of infection  He will return in 1 week  Procedure: All mycotic toenails were reduced and debrided in length, width, and girth using a nail nipper and dremel  Patient tolerated procedure(s) well without complications  Subjective:        HPI: The patients presents for diabetic foot care  He has high risk DM feet with history of amputation and ulcer  He has new ulcer on left foot for about 3 days  He does not recall how it started  Mild tenderness related to the wound  He presents with DM shoes today  The following portions of the patient's history were reviewed and updated as appropriate: allergies, current medications, past family history, past medical history, past social history, past surgical history and problem list   All pertinent labs and images were reviewed      Past Medical History  Past Medical History:   Diagnosis Date    Diabetes mellitus ([facility] )     Hypercholesteremia     Hyperlipidemia     Hypertension     Neuropathy     Obesity     Osteomyelitis ([facility] [address] )     last assessed 11/4/16    PVC's (premature ventricular contractions)     sees cardiology Dr Damon    Stroke [facility])     last weeof July 2018 [facility]       Past Surgical History  Past Surgical History:   Procedure Laterality Date    ABDOMINAL SURGERY      CHOLECYSTECTOMY      Percutaneous    OTHER SURGICAL HISTORY      "stimulator to control bowel movements"    CT ESOPHAGOGASTRODUODENOSCOPY TRANSORAL DIAGNOSTIC N/A 9/27/2016    Procedure: ESOPHAGOGASTRODUODENOSCOPY (EGD); Surgeon: Wolfgang Cardenas MD;  [facility]: [facility]; Service: Gastroenterology    GA LAP,CHOLECYSTECTOMY N/A 2/29/2016    Procedure: LAPAROSCOPIC CHOLECYSTECTOMY ;  Surgeon: Eduardo Avendano DO;  Location: [facility];  Service: General    ROTATOR CUFF REPAIR Right     TOE AMPUTATION Right 10/28/2016    Procedure: 3RD TOE AMPUTATION ;  Surgeon: Dee Cody DPM;  Location: [facility];  Service:         Allergies:  Patient has no known allergies  Medications:  Current Outpatient Medications   Medication Sig Dispense Refill    amLODIPine (NORVASC) 5 mg tablet Take 1 tablet (5 mg total) by mouth every 12 (twelve) hours 180 tablet 1    aspirin (ECOTRIN LOW STRENGTH) 81 mg EC tablet Take 81 mg by mouth daily Resume on 8/14      atorvastatin (LIPITOR) 80 mg tablet Take 1 tablet (80 mg total) by mouth daily with dinner 90 tablet 1    Blood Glucose Monitoring Suppl (ONE TOUCH ULTRA MINI) w/Device KIT by Does not apply route 3 (three) times a day 1 each 0    Blood Pressure Monitoring (BLOOD PRESSURE CUFF) MISC Use to check blood pressure before taking blood pressure medication and 1 hour after and follow instructions provided in discharge instructions based on the readings   1 each 0    carvedilol (COREG) 6 25 mg tablet Take 1 tablet (6 25 mg total) by mouth 2 (two) times a day with meals 180 tablet 1    Cholecalciferol (VITAMIN D3) 16090 units CAPS TAKE 1 TABLET BY MOUTH ONCE A WEEK 5 capsule 5    [facility]S VITAMIN B-12 1000 MCG tablet TAKE 1 TABLET BY MOUTH EVERY DAY 90 tablet 1    diphenoxylate-atropine (LOMOTIL) 2 5-0 025 mg per tablet Take 1 tablet by mouth 4 (four) times a day as needed for diarrhea      escitalopram (LEXAPRO) 10 mg tablet Take 1 tablet (10 mg total) by mouth daily at bedtime 90 tablet 0    famotidine (PEPCID) 20 mg tablet Take 20 mg by mouth daily Resume on 8/14      gabapentin (NEURONTIN) 250 mg/5 mL solution Take 12 mL (600 mg total) by mouth daily at bedtime 470 mL 2    glucagon (GLUCAGON EMERGENCY) 1 MG injection Inject 1 mg under the skin once as needed for low blood sugar for up to 1 dose 1 kit 2    glucose 4 g chewable tablet Chew 3 tablets (12 g total) as needed for low blood sugar 50 tablet 0    glucose blood (ONE TOUCH ULTRA TEST) test strip 1 each by Other route 3 (three) times a day Use as instructed 270 each 1    Incontinence Supplies (MALE URINAL) MISC by Does not apply route daily 6 each 3    insulin aspart (NovoLOG) 100 units/mL injection Inject 4 Units under the skin 3 (three) times a day before meals & Scale:150-200 -2 units, 201-250 -4 units, 251-300 -6 units, 301-350 -8 units, >350 -10 units (Patient taking differently: Inject 5 Units under the skin 3 (three) times a day before meals & Scale:150-200 -2 units, 201-250 -4 units, 251-300 -6 units, 301-350 -8 units, >350 -10 units) 50 mL 0    insulin glargine (LANTUS) 100 units/mL subcutaneous injection Inject 20 Units under the skin daily (Patient taking differently: Inject 25 Units under the skin daily ) 10 mL 0    Insulin Syringe-Needle U-100 (B-D INS SYR ULTRAFINE  3CC/30G) 30G X 1/2" 0 3 ML MISC by Does not apply route 4 (four) times a day 360 each 1    Insulin Syringe-Needle U-100 (B-D INS SYRINGE 0 5CC/30GX1/2") 30G X 1/2" 0 5 ML MISC Inject under the skin 4 (four) times a day 360 each 1    LUCENTIS 0 3 MG/0 05ML       ONETOUCH DELICA LANCETS 63P MISC by Does not apply route 3 (three) times a day 270 each 1    SBI/Protein Isolate (ENTERAGAM) 5 g PACK Take by mouth      mupirocin (BACTROBAN) 2 % cream Apply topically daily 30 g 0     No current facility-administered medications for this visit          Social History:  Social History     Socioeconomic History    Marital status: /Civil Union     Spouse name: None    Number of children: None    Years of education: None    Highest education level: None   Occupational History    Occupation: disabled   Social Needs    Financial resource strain: None    Food insecurity:     Worry: None     Inability: None    Transportation needs:     Medical: None     Non-medical: None   Tobacco Use    Smoking status: Never Smoker    Smokeless tobacco: Never Used   Substance and Sexual Activity    Alcohol use: No    Drug use: No    Sexual activity: Not Currently   Lifestyle    Physical activity:     Days per week: None     Minutes per session: None    Stress: None   Relationships    Social connections:     Talks on phone: None     Gets together: None     Attends Yarsanism service: None     Active member of club or organization: None     Attends meetings of clubs or organizations: None     Relationship status: None    Intimate partner violence:     Fear of current or ex partner: None     Emotionally abused: None     Physically abused: None     Forced sexual activity: None   Other Topics Concern    None   Social History Narrative    Daily caffeine consumption 2-3 servings a day        Review of Systems   Constitutional: Negative for chills and fever  Respiratory: Negative for cough and shortness of breath  Cardiovascular: Negative for chest pain and leg swelling  Gastrointestinal: Negative for constipation, diarrhea, nausea and vomiting  Skin: Positive for wound  Neurological: Positive for numbness  Psychiatric/Behavioral: Negative for confusion  Objective:      /85   Pulse 88   Ht 5' 10" (1 778 m)   Wt 108 kg (237 lb)   BMI 34 01 kg/m²          Physical Exam   Constitutional: He is oriented to person, place, and time  He appears well-developed and well-nourished  No distress  HENT:   Head: Normocephalic and atraumatic  Neck: Normal range of motion  Neck supple  Cardiovascular: Normal rate, regular rhythm and intact distal pulses  Pulmonary/Chest: Effort normal and breath sounds normal  No respiratory distress  Musculoskeletal:   No acute joint pain or edema  No redness  No ecchymosis  Hammertoe deformity noted  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  Neurological: He is alert and oriented to person, place, and time  A sensory deficit is present  Skin: Skin is warm  Capillary refill takes less than 2 seconds  No erythema  No pallor  Ulcer noted dorsolateral left foot  It is deep to dermal tissues  1 3 X 1 0 cm  Minimal redness  No edema  No purulence or drainage  Mycotic nails X 6 with hypertrophy and discoloration  Psychiatric: He has a normal mood and affect  Vitals reviewed

## 2019-10-03 ENCOUNTER — TELEPHONE (OUTPATIENT)
Dept: UROLOGY | Facility: CLINIC | Age: 59
End: 2019-10-03

## 2019-10-07 ENCOUNTER — OFFICE VISIT (OUTPATIENT)
Dept: ENDOCRINOLOGY | Facility: CLINIC | Age: 59
End: 2019-10-07
Payer: COMMERCIAL

## 2019-10-07 ENCOUNTER — DOCUMENTATION (OUTPATIENT)
Dept: ENDOCRINOLOGY | Facility: CLINIC | Age: 59
End: 2019-10-07

## 2019-10-07 VITALS
DIASTOLIC BLOOD PRESSURE: 72 MMHG | WEIGHT: 239 LBS | BODY MASS INDEX: 34.22 KG/M2 | SYSTOLIC BLOOD PRESSURE: 128 MMHG | HEIGHT: 70 IN

## 2019-10-07 DIAGNOSIS — E11.649 TYPE 2 DIABETES MELLITUS WITH HYPOGLYCEMIA WITHOUT COMA, WITH LONG-TERM CURRENT USE OF INSULIN (HCC): ICD-10-CM

## 2019-10-07 DIAGNOSIS — E11.65 TYPE 2 DIABETES MELLITUS WITH HYPERGLYCEMIA, WITH LONG-TERM CURRENT USE OF INSULIN (HCC): Primary | ICD-10-CM

## 2019-10-07 DIAGNOSIS — N18.4 BENIGN HYPERTENSION WITH CKD (CHRONIC KIDNEY DISEASE) STAGE IV (HCC): ICD-10-CM

## 2019-10-07 DIAGNOSIS — Z79.4 TYPE 2 DIABETES MELLITUS WITH HYPOGLYCEMIA WITHOUT COMA, WITH LONG-TERM CURRENT USE OF INSULIN (HCC): ICD-10-CM

## 2019-10-07 DIAGNOSIS — E78.2 MIXED HYPERLIPIDEMIA: ICD-10-CM

## 2019-10-07 DIAGNOSIS — Z79.4 TYPE 2 DIABETES MELLITUS WITH HYPERGLYCEMIA, WITH LONG-TERM CURRENT USE OF INSULIN (HCC): Primary | ICD-10-CM

## 2019-10-07 DIAGNOSIS — I12.9 BENIGN HYPERTENSION WITH CKD (CHRONIC KIDNEY DISEASE) STAGE IV (HCC): ICD-10-CM

## 2019-10-07 PROCEDURE — 99214 OFFICE O/P EST MOD 30 MIN: CPT | Performed by: PHYSICIAN ASSISTANT

## 2019-10-07 RX ORDER — INSULIN GLARGINE 100 [IU]/ML
20 INJECTION, SOLUTION SUBCUTANEOUS DAILY
Qty: 10 ML | Refills: 0
Start: 2019-10-07 | End: 2020-02-10 | Stop reason: SDUPTHER

## 2019-10-07 NOTE — PROGRESS NOTES
Patient Progress Note      CC: DM2      Referring Provider  Nitza Peres, Do  [address] [facility], [address] [place] [address]     History of Present Illness:   Bruno Sanchez is a 61 y o  male with a history of type 2 diabetes with long term use of insulin  Diabetes course has been stable  Complications of DM: CKD, TIA  Patient was hospitalized for 1 day for in August for hypoglycemia  He became unresponsive while on a family trip and was taken to ER  On arrival patient was unresponsive and diaphoretic, blood glucose was 40 mg/dl per ER note  Denies any issues with his current regimen  Home glucose monitoring: are performed regularly    Patient does not have blood glucose log or meter today  Current regimen: Lantus 20 units daily, Novolog 4 units TID with meals plus scale   compliant most of the time, denies any side effects from medications  Injects in: abdomen  Rotates sites: Yes  Hypoglycemic episodes: Yes, rare  H/o of hypoglycemia causing hospitalization or Intervention such as glucagon injection  or ambulance call :  Yes  Hypoglycemia symptoms: none  Treatment of hypoglycemia: discussed treatment  Glucagon injection  Medic alert tag: recommended: Yes    Diabetes education: No  Diet: 3 meals per day, 0-1 snacks per day  Timing of meals is predictable  Diabetic diet compliance:  noncompliant much of the time  Activity: Daily activity is predictable: No  The patient engages in little, if any physical activity  Further diabetic ROS: no polyuria or polydipsia, no chest pain, dyspnea, no numbness, tingling or pain in extremities        Ophthamology:  Eye exam up-to-date, August 2019  Podiatry: Foot exam up-to-date, January 2019    Not on ACE inhibitor/ARB  Has hyperlipidemia: on statin - tolerating well, no myalgias  compliant most of the time, denies any side effects from medications    Thyroid disorders: No  History of pancreatitis: No    Patient Active Problem List   Diagnosis    Type 2 diabetes mellitus with hyperglycemia, with long-term current use of insulin (HCC)    Mixed hyperlipidemia    Benign hypertension with CKD (chronic kidney disease) stage IV (HCC)    Persistent proteinuria    Cerebrovascular accident (CVA) due to thrombosis of left middle cerebral artery (HCC)    Cognitive impairment    Elevated alkaline phosphatase level    Stage 4 chronic kidney disease (HCC)    Diabetic macular edema (HCC)    GERD (gastroesophageal reflux disease)    Cirrhosis ([facility] [address] )    Diabetic polyneuropathy associated with type 2 diabetes mellitus (HCC)    Other constipation    Abnormal EEG    History of stroke    TIA (transient ischemic attack)    Stroke-like symptoms, with right-sided weakness    symptomatic hypoglycemia    Cellulitis    Anxiety associated with depression    Hypertension    Hypothermia      Past Medical History:   Diagnosis Date    [facility] mellitus ([facility] )     Hypercholesteremia     Hyperlipidemia     Hypertension     Neuropathy     Obesity     [facility]     last assessed 11/4/16    PVC's (premature ventricular contractions)     sees cardiology Dr Sarah camargo    Stroke [facility])     last weeof July 2018 [facility]      Past Surgical History:   Procedure Laterality Date    ABDOMINAL SURGERY      CHOLECYSTECTOMY      Percutaneous    OTHER SURGICAL HISTORY      "stimulator to control bowel movements"    TX ESOPHAGOGASTRODUODENOSCOPY TRANSORAL DIAGNOSTIC N/A 9/27/2016    Procedure: ESOPHAGOGASTRODUODENOSCOPY (EGD); Surgeon: Shelbi Victor MD;  [facility]: [facility];   Service: Gastroenterology    TX LAP,CHOLECYSTECTOMY N/A 2/29/2016    Procedure: LAPAROSCOPIC CHOLECYSTECTOMY ;  Surgeon: Fabricio uEceda DO;  Location: [facility];  Service: General    ROTATOR CUFF REPAIR Right     TOE AMPUTATION Right 10/28/2016    Procedure: 3RD TOE AMPUTATION ;  Surgeon: Reno Bonilla DPM;  [facility];  Service:       Family History Problem Relation Age of Onset    Leukemia Mother     Liver disease Mother     Lung cancer Mother         heavy smoker - 3 ppd    Heart disease Father     Liver disease Father     Multiple myeloma Sister     Breast cancer Sister     Urolithiasis Family     Alcohol abuse Neg Hx     Depression Neg Hx     Drug abuse Neg Hx     Substance Abuse Neg Hx     Mental illness Neg Hx      Social History     Tobacco Use    Smoking status: Never Smoker    Smokeless tobacco: Never Used   Substance Use Topics    Alcohol use: No     No Known Allergies      Current Outpatient Medications:     amLODIPine (NORVASC) 5 mg tablet, Take 1 tablet (5 mg total) by mouth every 12 (twelve) hours, Disp: 180 tablet, Rfl: 1    aspirin (ECOTRIN LOW STRENGTH) 81 mg EC tablet, Take 81 mg by mouth daily Resume on 8/14, Disp: , Rfl:     atorvastatin (LIPITOR) 80 mg tablet, Take 1 tablet (80 mg total) by mouth daily with dinner, Disp: 90 tablet, Rfl: 1    Blood Glucose Monitoring Suppl (ONE TOUCH ULTRA MINI) w/Device KIT, by Does not apply route 3 (three) times a day, Disp: 1 each, Rfl: 0    Blood Pressure Monitoring (BLOOD PRESSURE CUFF) MISC, Use to check blood pressure before taking blood pressure medication and 1 hour after and follow instructions provided in discharge instructions based on the readings  , Disp: 1 each, Rfl: 0    carvedilol (COREG) 6 25 mg tablet, Take 1 tablet (6 25 mg total) by mouth 2 (two) times a day with meals, Disp: 180 tablet, Rfl: 1    Cholecalciferol (VITAMIN D3) 31499 units CAPS, TAKE 1 TABLET BY MOUTH ONCE A WEEK, Disp: 5 capsule, Rfl: 5    CVS VITAMIN B-12 1000 MCG tablet, TAKE 1 TABLET BY MOUTH EVERY DAY, Disp: 90 tablet, Rfl: 1    diphenoxylate-atropine (LOMOTIL) 2 5-0 025 mg per tablet, Take 1 tablet by mouth 4 (four) times a day as needed for diarrhea, Disp: , Rfl:     escitalopram (LEXAPRO) 10 mg tablet, Take 1 tablet (10 mg total) by mouth daily at bedtime, Disp: 90 tablet, Rfl: 0   famotidine (PEPCID) 20 mg tablet, Take 20 mg by mouth daily Resume on 8/14, Disp: , Rfl:     gabapentin (NEURONTIN) 250 mg/5 mL solution, Take 12 mL (600 mg total) by mouth daily at bedtime, Disp: 470 mL, Rfl: 2    glucagon (GLUCAGON EMERGENCY) 1 MG injection, Inject 1 mg under the skin once as needed for low blood sugar for up to 1 dose, Disp: 1 kit, Rfl: 2    glucose blood (ONE TOUCH ULTRA TEST) test strip, 1 each by Other route 3 (three) times a day Use as instructed, Disp: 270 each, Rfl: 1    Incontinence Supplies (MALE URINAL) MISC, by Does not apply route daily, Disp: 6 each, Rfl: 3    insulin aspart (NovoLOG) 100 units/mL injection, Inject 4 Units under the skin 3 (three) times a day before meals Plus scale, Disp: , Rfl:     insulin glargine (LANTUS) 100 units/mL subcutaneous injection, Inject 20 Units under the skin daily, Disp: 10 mL, Rfl: 0    Insulin Syringe-Needle U-100 (B-D INS SYRINGE 0 5CC/30GX1/2") 30G X 1/2" 0 5 ML MISC, Inject under the skin 4 (four) times a day, Disp: 360 each, Rfl: 1    ONETOUCH DELICA LANCETS 88W MISC, by Does not apply route 3 (three) times a day, Disp: 270 each, Rfl: 1    glucose 4 g chewable tablet, Chew 3 tablets (12 g total) as needed for low blood sugar (Patient not taking: Reported on 10/7/2019), Disp: 50 tablet, Rfl: 0    Insulin Syringe-Needle U-100 (B-D INS SYR ULTRAFINE  3CC/30G) 30G X 1/2" 0 3 ML MISC, by Does not apply route 4 (four) times a day (Patient not taking: Reported on 10/7/2019), Disp: 360 each, Rfl: 1    LUCENTIS 0 3 MG/0 05ML, , Disp: , Rfl:     mupirocin (BACTROBAN) 2 % cream, Apply topically daily (Patient not taking: Reported on 10/7/2019), Disp: 30 g, Rfl: 0    SBI/Protein Isolate (ENTERAGAM) 5 g PACK, Take by mouth, Disp: , Rfl:   Review of Systems   Constitutional: Positive for fatigue  Negative for activity change, appetite change and unexpected weight change  HENT: Negative for trouble swallowing      Eyes: Negative for visual disturbance  Respiratory: Negative for shortness of breath  Cardiovascular: Negative for chest pain and palpitations  Gastrointestinal: Negative for constipation and diarrhea  Endocrine: Negative for polydipsia and polyuria  Musculoskeletal: Negative  Skin: Negative  Neurological: Negative for numbness  Psychiatric/Behavioral: Negative  Physical Exam:  Body mass index is 34 29 kg/m²  /72   Ht 5' 10" (1 778 m)   Wt 108 kg (239 lb)   BMI 34 29 kg/m²    Wt Readings from Last 3 Encounters:   10/07/19 108 kg (239 lb)   10/01/19 108 kg (237 lb)   09/18/19 108 kg (238 lb 3 2 oz)       Physical Exam   Constitutional: He appears well-developed and well-nourished  HENT:   Head: Normocephalic  Eyes: Pupils are equal, round, and reactive to light  EOM are normal  No scleral icterus  Neck: Neck supple  No thyromegaly present  Cardiovascular: Normal rate and regular rhythm  No murmur heard  Pulses:       Radial pulses are 2+ on the right side, and 2+ on the left side  Pulmonary/Chest: Effort normal and breath sounds normal  No respiratory distress  He has no wheezes  Neurological: He is alert  He has normal reflexes  Skin: Skin is warm and dry  Psychiatric: He has a normal mood and affect  Nursing note and vitals reviewed  Patient's shoes and socks were not removed        Labs:   Component      Latest Ref Rng & Units 7/10/2019 8/25/2019 9/13/2019   Sodium      136 - 145 mmol/L  140 141   Potassium      3 5 - 5 3 mmol/L  4 1 4 5   Chloride      100 - 108 mmol/L  107 106   CO2      21 - 32 mmol/L  24 24   Anion Gap      4 - 13 mmol/L  9 11   BUN      5 - 25 mg/dL  32 (H) 49 (H)   Creatinine      0 60 - 1 30 mg/dL  2 81 (H) 2 81 (H)   Glucose, Random      65 - 140 mg/dL  159 (H) 129   Calcium      8 3 - 10 1 mg/dL  8 6 8 5   eGFR      ml/min/1 73sq m  24 24   Hemoglobin A1C      4 2 - 6 3 % 7 2 (H)     EAG      mg/dl 160         Plan:    Diagnoses and all orders for this visit:    Type 2 diabetes mellitus with hyperglycemia, with long-term current use of insulin ([facility])  HGA1C 7 2%  Due for A1C now  Had severe hypoglycemic episode recently  Has glucagom pen  Treatment regimen:  Unable to make changes to insulin regimen as patient did not bring blood sugar log today  Advised patient to send blood sugar log  Discussed intensive insulin regimen does increase risk for hypoglycemia  Episodes of hypoglycemia can lead to permanent disability and death  Discussed risks/complications associated with uncontrolled diabetes  Advised to adhere to diabetic diet, and recommended staying active/exercising routinely as tolerated  Keep carbohydrates consistent to limit blood glucose fluctuations  Advised to call if blood sugars less than 70 mg/dl or over 300 mg/dl  Check blood glucose 4 times a day  Discussed symptoms and treatment of hypoglycemia  Discussed use of CGM to collect additional blood glucose data to reveal trends and patterns that can be used to optimize treatment plan  Will have [facility] start Dexcom paperwork  Referred to diabetes/nutrition education  Recommended routine follow-up with podiatry and ophthalmology  Send log now and in 1-2 weeks  Ordered blood work to complete prior to next visit  -     Hemoglobin A1C; Future  -     Basic metabolic panel; Future  -     Hemoglobin A1C; Future  -     Basic metabolic panel; Future  -     insulin aspart (NovoLOG) 100 units/mL injection; Inject 4 Units under the skin 3 (three) times a day before meals Plus scale  -     insulin glargine (LANTUS) 100 units/mL subcutaneous injection; Inject 20 Units under the skin daily  -     Ambulatory referral to medical nutrition therapy for diabetes; Future    Benign hypertension with CKD (chronic kidney disease) stage IV ([facility])  Blood pressure well controlled, continue current treatment  Managed by Nephrology  -     Basic metabolic panel; Future  -     Basic metabolic panel;  Future    Mixed hyperlipidemia  LDL previously 20  Continue statin therapy  Managed by PCP/cardiology    Discussed with the patient diagnosis and treatment and all questions fully answered  He will call me if any problems arise  Counseled patient on diagnostic results, prognosis, risk and benefit of treatment options, instruction for management, importance of treatment compliance, risk factor reduction and impressions        Lissette Brennan PA-C

## 2019-10-07 NOTE — PATIENT INSTRUCTIONS
INSULIN DOSAGE INSTRUCTIONS    Name: Paras Gar                        : 1960  MRN #: [id number]    Your Current Insulin  and dose is: Before Breakfast Before Lunch Before Evening Meal Bedtime     Novolog Insulin   4 units     4 units   4 units    Regular, Apidra, Humalog orNovolog Sliding Scale:   <80              151-200 +1 +1 +1    201-250 +2 +2 +2 +   251-300 +3 +3 +3 +   301-350 +4 +4 +4 +   >350 +5 +5 +5 +       Lantus Insulin    20 units     Additional Instructions:   Please test your blood sugar:  _4_ Times per day  X_ Before Breakfast                _ Alternate Testing  X_ Before Lunch                _ 2 Hours After  Meal  X_ Before Evening Meal               _ 3 a m   x_ Before Bedtime Snack     Target Blood sugar range _80_to _180__  Call if your  blood sugar is less than _70_ or greater than _400__  Today's Date: 10/7/2019      Hypoglycemia instructions   Paras Gar  10/7/2019  [id number]    Low Blood Sugar    Steps to treat low blood sugar  1  Test blood sugar if you have symptoms of low blood sugar:   Low Blood Sugar Symptoms:  o Sweaty  o Dizzy  o Rapid heartbeat  o Shaky  o Bad mood  o Hungry      2  Treat blood sugar less than 70 with 15 grams of fast-acting carbohydrate:   Examples of 15 grams Fast-Acting Carbohydrate:  o 4 oz juice  o 4 oz regular soda  o 3-4 glucose tablets (chew)  o 3-4 hard candies (chew)          3  Wait 15 minutes and test your blood sugar again     4   If blood sugar is less than 100, repeat steps 2-3     5  When your blood sugar is 100 or more, eat a snack if it will be longer than one hour until your next meal  The snack should be 15 grams of carbohydrate and a protein:   Examples of snacks:  o ½ sandwich  o 6 crackers with cheese  o Piece of fruit with cheese or peanut butter  o 6 crackers with peanut butter

## 2019-10-08 ENCOUNTER — OFFICE VISIT (OUTPATIENT)
Dept: PODIATRY | Facility: CLINIC | Age: 59
End: 2019-10-08
Payer: COMMERCIAL

## 2019-10-08 VITALS
HEART RATE: 80 BPM | BODY MASS INDEX: 34.23 KG/M2 | HEIGHT: 70 IN | WEIGHT: 239.1 LBS | DIASTOLIC BLOOD PRESSURE: 70 MMHG | SYSTOLIC BLOOD PRESSURE: 128 MMHG

## 2019-10-08 DIAGNOSIS — E11.42 DIABETIC POLYNEUROPATHY ASSOCIATED WITH TYPE 2 DIABETES MELLITUS (HCC): ICD-10-CM

## 2019-10-08 DIAGNOSIS — L97.529 ULCER OF LEFT FOOT, UNSPECIFIED ULCER STAGE (HCC): Primary | ICD-10-CM

## 2019-10-08 PROCEDURE — 99213 OFFICE O/P EST LOW 20 MIN: CPT | Performed by: PODIATRIST

## 2019-10-08 NOTE — PROGRESS NOTES
PATIENT:  Patt Gottlieb    1960    ASSESSMENT:     1  Ulcer of left foot, unspecified ulcer stage ([facility] )     2  Diabetic polyneuropathy associated with type 2 diabetes mellitus ([place] [address] )           PLAN:  Patient was counseled on the condition and diagnosis  Educated disease prevention and risks related to diabetes  Continue local care with Bactroban  Stressed on patient compliance about proper dressing and off-loading shoe  Surgical shoe was dispensed  Instructed to identify any signs of infection  He will return in 2 weeks  Subjective:        HPI: The patients presents for wound care  He presents with DM shoes  He also presents with a bandaid over the wound  He was not able to find surgical shoe  His wife reported that he would not wear CAM walker  Pain has been better  No redness  The following portions of the patient's history were reviewed and updated as appropriate: allergies, current medications, past family history, past medical history, past social history, past surgical history and problem list   All pertinent labs and images were reviewed  Past Medical History  Past Medical History:   Diagnosis Date    Diabetes mellitus ([place] [address] )     Hypercholesteremia     Hyperlipidemia     Hypertension     Neuropathy     Obesity     Osteomyelitis ([place] [address] )     last assessed 11/4/16    PVC's (premature ventricular contractions)     sees cardiology Dr Torres camargo    Stroke [facility])     last weeof July 2018 [facility]       Past Surgical History  Past Surgical History:   Procedure Laterality Date    ABDOMINAL SURGERY      CHOLECYSTECTOMY      Percutaneous    OTHER SURGICAL HISTORY      "stimulator to control bowel movements"    CT ESOPHAGOGASTRODUODENOSCOPY TRANSORAL DIAGNOSTIC N/A 9/27/2016    Procedure: ESOPHAGOGASTRODUODENOSCOPY (EGD); Surgeon: Ligia Dupree MD;  [facility];   Service: Gastroenterology    CT LAP,CHOLECYSTECTOMY N/A 2/29/2016    Procedure: LAPAROSCOPIC CHOLECYSTECTOMY ;  Surgeon: Fito Eubanks DO;  Location[facility];  Service: General    ROTATOR CUFF REPAIR Right     TOE AMPUTATION Right 10/28/2016    Procedure: 3RD TOE AMPUTATION ;  Surgeon: Kiya Clavo DPM;  [facility];  Service:         Allergies:  Patient has no known allergies  Medications:  Current Outpatient Medications   Medication Sig Dispense Refill    amLODIPine (NORVASC) 5 mg tablet Take 1 tablet (5 mg total) by mouth every 12 (twelve) hours 180 tablet 1    aspirin (ECOTRIN LOW STRENGTH) 81 mg EC tablet Take 81 mg by mouth daily Resume on 8/14      atorvastatin (LIPITOR) 80 mg tablet Take 1 tablet (80 mg total) by mouth daily with dinner 90 tablet 1    Blood Glucose Monitoring Suppl (ONE TOUCH ULTRA MINI) w/Device KIT by Does not apply route 3 (three) times a day 1 each 0    Blood Pressure Monitoring (BLOOD PRESSURE CUFF) MISC Use to check blood pressure before taking blood pressure medication and 1 hour after and follow instructions provided in discharge instructions based on the readings   1 each 0    carvedilol (COREG) 6 25 mg tablet Take 1 tablet (6 25 mg total) by mouth 2 (two) times a day with meals 180 tablet 1    Cholecalciferol (VITAMIN D3) 60794 units CAPS TAKE 1 TABLET BY MOUTH ONCE A WEEK 5 capsule 5    CVS VITAMIN B-12 1000 MCG tablet TAKE 1 TABLET BY MOUTH EVERY DAY 90 tablet 1    diphenoxylate-atropine (LOMOTIL) 2 5-0 025 mg per tablet Take 1 tablet by mouth 4 (four) times a day as needed for diarrhea      escitalopram (LEXAPRO) 10 mg tablet Take 1 tablet (10 mg total) by mouth daily at bedtime 90 tablet 0    famotidine (PEPCID) 20 mg tablet Take 20 mg by mouth daily Resume on 8/14      gabapentin (NEURONTIN) 250 mg/5 mL solution Take 12 mL (600 mg total) by mouth daily at bedtime 470 mL 2    glucagon (GLUCAGON EMERGENCY) 1 MG injection Inject 1 mg under the skin once as needed for low blood sugar for up to 1 dose 1 kit 2    glucose blood (ONE TOUCH ULTRA TEST) test strip 1 each by Other route 3 (three) times a day Use as instructed 270 each 1    Incontinence Supplies (MALE URINAL) MISC by Does not apply route daily 6 each 3    insulin aspart (NovoLOG) 100 units/mL injection Inject 4 Units under the skin 3 (three) times a day before meals Plus scale      insulin glargine (LANTUS) 100 units/mL subcutaneous injection Inject 20 Units under the skin daily 10 mL 0    Insulin Syringe-Needle U-100 (B-D INS SYRINGE 0 5CC/30GX1/2") 30G X 1/2" 0 5 ML MISC Inject under the skin 4 (four) times a day 360 each 1    LUCENTIS 0 3 MG/0 05ML       ONETOUCH DELICA LANCETS 09X MISC by Does not apply route 3 (three) times a day 270 each 1    SBI/Protein Isolate (ENTERAGAM) 5 g PACK Take by mouth      glucose 4 g chewable tablet Chew 3 tablets (12 g total) as needed for low blood sugar (Patient not taking: Reported on 10/7/2019) 50 tablet 0    Insulin Syringe-Needle U-100 (B-D INS SYR ULTRAFINE  3CC/30G) 30G X 1/2" 0 3 ML MISC by Does not apply route 4 (four) times a day (Patient not taking: Reported on 10/7/2019) 360 each 1    mupirocin (BACTROBAN) 2 % cream Apply topically daily (Patient not taking: Reported on 10/7/2019) 30 g 0     No current facility-administered medications for this visit          Social History:  Social History     Socioeconomic History    Marital status: /Civil Union     Spouse name: None    Number of children: None    Years of education: None    Highest education level: None   Occupational History    Occupation: disabled   Social Needs    Financial resource strain: None    Food insecurity:     Worry: None     Inability: None    Transportation needs:     Medical: None     Non-medical: None   Tobacco Use    Smoking status: Never Smoker    Smokeless tobacco: Never Used   Substance and Sexual Activity    Alcohol use: No    Drug use: No    Sexual activity: Not Currently   Lifestyle    Physical activity:     Days per week: None     Minutes per session: None    Stress: None   Relationships    Social connections:     Talks on phone: None     Gets together: None     Attends [employer] service: None     Active member of club or organization: None     Attends meetings of clubs or organizations: None     Relationship status: None    Intimate partner violence:     Fear of current or ex partner: None     Emotionally abused: None     Physically abused: None     Forced sexual activity: None   Other Topics Concern    None   Social History Narrative    Daily caffeine consumption 2-3 servings a day        Review of Systems   Constitutional: Negative for chills and fever  Respiratory: Negative for cough and shortness of breath  Cardiovascular: Negative for chest pain and leg swelling  Gastrointestinal: Negative for constipation, diarrhea, nausea and vomiting  Skin: Positive for wound  Neurological: Positive for numbness  Psychiatric/Behavioral: Negative for confusion  Objective:      /70   Pulse 80   Ht 5' 10" (1 778 m)   Wt 108 kg (239 lb 1 6 oz)   BMI 34 31 kg/m²          Physical Exam   Constitutional: He is oriented to person, place, and time  He appears well-developed and well-nourished  No distress  Cardiovascular: Normal rate, regular rhythm and intact distal pulses  Pulmonary/Chest: Effort normal and breath sounds normal  No respiratory distress  Musculoskeletal:   No acute joint pain or edema  No redness  No ecchymosis  Hammertoe deformity noted  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  Neurological: He is alert and oriented to person, place, and time  A sensory deficit is present  Skin: Capillary refill takes less than 2 seconds  No erythema  No pallor  Ulcer noted dorsolateral left foot  It is deep to dermal tissues  1 3 X 1 0 cm  Minimal redness  No edema  No purulence or drainage  Wound bed is mostly granular  Psychiatric: He has a normal mood and affect     Vitals reviewed

## 2019-10-11 DIAGNOSIS — E55.9 VITAMIN D DEFICIENCY: ICD-10-CM

## 2019-10-11 RX ORDER — CHOLECALCIFEROL (VITAMIN D3) 1250 MCG
50000 CAPSULE ORAL WEEKLY
Qty: 5 CAPSULE | Refills: 5 | Status: SHIPPED | OUTPATIENT
Start: 2019-10-11 | End: 2020-08-14

## 2019-10-11 NOTE — TELEPHONE ENCOUNTER
Medication refill check list    Correct patient? yes   Correct medication name, dose, and pill size? yes   Correct provider? yes   Last and Next appt  scheduled? Yes, last date 03/07/19 & next date no fu scheduled    Right pharmacy listed? yes   Correct quantity for 30 or 90 days? yes   Is the patient out of refills? When was it last prescribed? Yes, last date 03/18/19   Directions match what the patient says they are taking?  yes   Enough refills? (none for controlled substances, 1 year for routine medications) yes       Refill request via FAX# [phone] from [facility]# [phone] for Cholecalciferol (VITAMIN D3) 20871 units CAPS

## 2019-10-12 ENCOUNTER — TRANSCRIBE ORDERS (OUTPATIENT)
Dept: LAB | Facility: CLINIC | Age: 59
End: 2019-10-12

## 2019-10-12 ENCOUNTER — APPOINTMENT (OUTPATIENT)
Dept: LAB | Facility: CLINIC | Age: 59
End: 2019-10-12
Payer: COMMERCIAL

## 2019-10-12 DIAGNOSIS — N18.4 BENIGN HYPERTENSION WITH CKD (CHRONIC KIDNEY DISEASE) STAGE IV (HCC): ICD-10-CM

## 2019-10-12 DIAGNOSIS — E11.65 TYPE 2 DIABETES MELLITUS WITH HYPERGLYCEMIA, WITH LONG-TERM CURRENT USE OF INSULIN (HCC): ICD-10-CM

## 2019-10-12 DIAGNOSIS — I12.9 BENIGN HYPERTENSION WITH CKD (CHRONIC KIDNEY DISEASE) STAGE IV (HCC): ICD-10-CM

## 2019-10-12 DIAGNOSIS — Z79.4 TYPE 2 DIABETES MELLITUS WITH HYPERGLYCEMIA, WITH LONG-TERM CURRENT USE OF INSULIN (HCC): ICD-10-CM

## 2019-10-12 LAB
ANION GAP SERPL CALCULATED.3IONS-SCNC: 9 MMOL/L (ref 4–13)
BUN SERPL-MCNC: 47 MG/DL (ref 5–25)
CALCIUM SERPL-MCNC: 8.9 MG/DL (ref 8.3–10.1)
CHLORIDE SERPL-SCNC: 105 MMOL/L (ref 100–108)
CO2 SERPL-SCNC: 26 MMOL/L (ref 21–32)
CREAT SERPL-MCNC: 3.3 MG/DL (ref 0.6–1.3)
EST. AVERAGE GLUCOSE BLD GHB EST-MCNC: 163 MG/DL
GFR SERPL CREATININE-BSD FRML MDRD: 19 ML/MIN/1.73SQ M
GLUCOSE P FAST SERPL-MCNC: 195 MG/DL (ref 65–99)
HBA1C MFR BLD: 7.3 % (ref 4.2–6.3)
POTASSIUM SERPL-SCNC: 4.6 MMOL/L (ref 3.5–5.3)
SODIUM SERPL-SCNC: 140 MMOL/L (ref 136–145)

## 2019-10-12 PROCEDURE — 36415 COLL VENOUS BLD VENIPUNCTURE: CPT

## 2019-10-12 PROCEDURE — 83036 HEMOGLOBIN GLYCOSYLATED A1C: CPT

## 2019-10-12 PROCEDURE — 80048 BASIC METABOLIC PNL TOTAL CA: CPT

## 2019-10-14 ENCOUNTER — HOSPITAL ENCOUNTER (OUTPATIENT)
Dept: NON INVASIVE DIAGNOSTICS | Facility: CLINIC | Age: 59
Discharge: HOME | End: 2019-10-14
Payer: COMMERCIAL

## 2019-10-14 ENCOUNTER — TELEPHONE (OUTPATIENT)
Dept: UROLOGY | Facility: MEDICAL CENTER | Age: 59
End: 2019-10-14

## 2019-10-14 ENCOUNTER — TELEPHONE (OUTPATIENT)
Dept: ENDOCRINOLOGY | Facility: CLINIC | Age: 59
End: 2019-10-14

## 2019-10-14 DIAGNOSIS — E11.65 TYPE 2 DIABETES MELLITUS WITH HYPERGLYCEMIA, WITH LONG-TERM CURRENT USE OF INSULIN (HCC): ICD-10-CM

## 2019-10-14 DIAGNOSIS — R06.02 EXERTIONAL SHORTNESS OF BREATH: ICD-10-CM

## 2019-10-14 DIAGNOSIS — Z79.4 TYPE 2 DIABETES MELLITUS WITH HYPERGLYCEMIA, WITH LONG-TERM CURRENT USE OF INSULIN (HCC): ICD-10-CM

## 2019-10-14 DIAGNOSIS — Z86.73 HISTORY OF STROKE: ICD-10-CM

## 2019-10-14 DIAGNOSIS — N18.4 STAGE 4 CHRONIC KIDNEY DISEASE (HCC): ICD-10-CM

## 2019-10-14 DIAGNOSIS — I63.312 CEREBROVASCULAR ACCIDENT (CVA) DUE TO THROMBOSIS OF LEFT MIDDLE CEREBRAL ARTERY (HCC): ICD-10-CM

## 2019-10-14 DIAGNOSIS — I12.9 BENIGN HYPERTENSION WITH CKD (CHRONIC KIDNEY DISEASE) STAGE IV (HCC): ICD-10-CM

## 2019-10-14 DIAGNOSIS — N18.4 BENIGN HYPERTENSION WITH CKD (CHRONIC KIDNEY DISEASE) STAGE IV (HCC): ICD-10-CM

## 2019-10-14 PROCEDURE — A9502 TC99M TETROFOSMIN: HCPCS

## 2019-10-14 PROCEDURE — 93017 CV STRESS TEST TRACING ONLY: CPT

## 2019-10-14 PROCEDURE — 78452 HT MUSCLE IMAGE SPECT MULT: CPT

## 2019-10-14 PROCEDURE — 78452 HT MUSCLE IMAGE SPECT MULT: CPT | Performed by: INTERNAL MEDICINE

## 2019-10-14 PROCEDURE — 93018 CV STRESS TEST I&R ONLY: CPT | Performed by: INTERNAL MEDICINE

## 2019-10-14 PROCEDURE — 93016 CV STRESS TEST SUPVJ ONLY: CPT | Performed by: INTERNAL MEDICINE

## 2019-10-14 RX ADMIN — REGADENOSON 0.4 MG: 0.08 INJECTION, SOLUTION INTRAVENOUS at 14:26

## 2019-10-14 NOTE — TELEPHONE ENCOUNTER
Patient of Dr Nanci Perez seen at [facility]  Received call from Shante Navarrete at [facility] ([facility] Cardiology stating that patient had a NM test done today, so his urine would be radioactive  Patient told her that he had a cysto with us, so she wanted to know if appointment should be cancelled  [facility]  checked with Dr Nanci Perez and he said patient needs to reschedule  Shante Navarrete hung up by the time Dr Nanci Perez responded

## 2019-10-14 NOTE — TELEPHONE ENCOUNTER
I called the patient and spoke to his wife  I advised her that we needed to get him rescheduled for the cysto, but I did not have an opening anytime soon  Please help with scheduling a cysto  She can be reached at [phone]

## 2019-10-14 NOTE — TELEPHONE ENCOUNTER
----- Message from Lauren Yates PA-C sent at 10/14/2019  9:52 AM EDT -----  Please call the patient regarding his abnormal result  A1C 7 3%  Could be falsely low due to chronic kidney disease  Advise patient to send log so adjustments can be made to insulin regimen if needed

## 2019-10-15 ENCOUNTER — OFFICE VISIT (OUTPATIENT)
Dept: DIABETES SERVICES | Facility: CLINIC | Age: 59
End: 2019-10-15
Payer: COMMERCIAL

## 2019-10-15 ENCOUNTER — TELEPHONE (OUTPATIENT)
Dept: CARDIOLOGY CLINIC | Facility: CLINIC | Age: 59
End: 2019-10-15

## 2019-10-15 VITALS — BODY MASS INDEX: 34.09 KG/M2 | WEIGHT: 237.6 LBS

## 2019-10-15 DIAGNOSIS — E11.65 TYPE 2 DIABETES MELLITUS WITH HYPERGLYCEMIA, WITH LONG-TERM CURRENT USE OF INSULIN (HCC): Primary | ICD-10-CM

## 2019-10-15 DIAGNOSIS — Z79.4 TYPE 2 DIABETES MELLITUS WITH HYPERGLYCEMIA, WITH LONG-TERM CURRENT USE OF INSULIN (HCC): Primary | ICD-10-CM

## 2019-10-15 PROCEDURE — 97802 MEDICAL NUTRITION INDIV IN: CPT | Performed by: DIETITIAN, REGISTERED

## 2019-10-15 NOTE — TELEPHONE ENCOUNTER
----- Message from Jessica Willett MD sent at 10/15/2019  7:28 AM EDT -----  Pt's Patient's stress test is normal    Patient can keep regular appointment  Please call patient with the result

## 2019-10-15 NOTE — TELEPHONE ENCOUNTER
Patient of Dr Cathy Herring seen in the [facility] office  Per Dr Cathy Herrign please try and schedule patient for cystoscopy prior to his UDS appointment on 10/23  Called and spoke with patients wife at this time  Informed her we have availability for her husbands cystoscopy on 10/22 at 8am  It was recommended by Dr Cathy Herring that this procedure be done prior to his UDS  Patient's wife said to schedule the appointment, she just needs to make sure someone can take patient at that time  Patients wife will call back to confirm appointment

## 2019-10-15 NOTE — TELEPHONE ENCOUNTER
Patient's spouse Alvaro Chavez will inform patient of normal stress test and to keep regular appointments

## 2019-10-15 NOTE — PATIENT INSTRUCTIONS
45-60 grams of carbohydrate per meal   15 grams of carbohydrate per snack  3 meals per day, 3 snacks per day  Snacks at least 2 hours apart from meals  Complete 3 day food record and bring with you to follow up appointment in 6 weeks

## 2019-10-15 NOTE — PROGRESS NOTES
Medical Nutrition Therapy        Assessment    Visit Type: Initial visit    Chief complaint T2DM    HPI: Alex Plasencia is a 61year old male diagnosed with type 2 diabetes  Complications of diabetes include Stage 4 CKD and TIA  Most recent HbA1c 7 3%  Alex Plasencia was admitted to hospital on August 24th for 1 day due to hypoglycemia  He reports that he was driving with his son when hypoglycemic event occurred and next remembers being in the hospital  Per ER note patient was found to initially have BG of 40 mg/dl and became responsive after being given D50  His wife states that she gave him too much insulin  Upon further discussion his wife states that she gave him the prescribed amount of fast acting insulin and that Alex Plasencia did not eat enough if any carbohydrate  Alex Plasencia reports insulin dosages were reduced upon discharge from the hospital  Chart note reflects a reduction from 8 units to 4 units of Novolog  Discussed with Alex Plasencia and his wife the timing and action of both Novolog and Lantus  Explained the importance of having the appropriate amount of carbohydrates 15 minutes after taking Novolog  Juventino diet history reveals inadequate carbohydrate intake  Currently meals range from 0 to 30 grams of carbohydrate  Breakfast and lunch per diet recall contain 30 grams of carbohydrate each and dinner example in recall is void of carbohydrates altogether  Explained basic pathophysiology of diabetes and impact of diet on blood glucose levels  Together we discussed what foods contain CHO, reading a food label, timing of meals, serving sizes, and the importance of consistent carbohydrate intake  Used the portion booklet to teach Alex Plasencia and his wife more about food groups and basic carbohydrate counting  Created an individualized meal plan for Alex Plasencia with 3 meals and 3 snacks providing 45-60 g carb per meal and 15 g carb per snack  Put together sample meals for Asad's reference   Discussed with Alex Plasencia and his wife that as he begins to follow meal plan it is important to submit BG log so that insulin dose can be adjusted if needed  Ravinder Ellsworth demonstrated good understanding and will call with any questions prior to follow-up appointment in 6 weeks  Ht Readings from Last 1 Encounters:   10/08/19 5' 10" (1 778 m)     Wt Readings from Last 2 Encounters:   10/15/19 108 kg (237 lb 9 6 oz)   10/08/19 108 kg (239 lb 1 6 oz)     Weight Change: No    Medical Diagnosis/ICD10: E11 65, Z79 4 (ICD-10-CM) - Type 2 diabetes mellitus with hyperglycemia, with long-term current use of insulin    Barriers to Learning: no barriers    Do you follow any special diet presently?: Yes - trying to stay away from red meat and avoiding bread,potato, or rice  Who shops: spouse  Who cooks: spouse    Food Log: Completed via the method of food recall    Breakfast:wakes 5 am  2 eggs, 2 slice of toast (white), coffee (3 cups) splenda and cream 2%  Morning Snack:n/a  Lunch:12 pm tuna fish sandwich and a salad (lettuce, tomato, cucumber with balsamic)   Afternoon Snack: n/a  Dinner:5:30 - 6 pm  Fish, salad  OR chicken with salad  Water  Evening Snack: 8- 9 pm 6-7 grapes (small) maybe only 2-3 nights per week     Beverages: water and coffee  Eating out/Take out:never  Exercise none    Calorie needs 1800 kcals/day Carbs: 45 - 60 g/meal, 15 g/snack         Nutrition Diagnosis:  Inadequate carbohydrate intake  related to Food and nutrition related knowledge deficit concerning appropriate amount of dietary carbohydrate as evidenced by  Estimated carbohydrate intake less than recommended amounts    Intervention: increased fiber intake, label reading, carbohydrate counting, meal timing, meal planning, individualized meal plan and food diary     Treatment Goals: Patient understands education and recommendations, Patient will monitor food intake daily with tracker and Patient will count carbohydrates    Monitoring and evaluation:    Term code indicator  FH 1 6 3 Carbohydrate Intake Criteria: 45 - 60 grams at breakfast, 45 grams of carbohydrate at lunch, 60 grams of carbohydrate at dinner  15 grams of carbohydrate per snack  Term code indicator  FH 4 4 Mealtime Behavior Criteria: 3 meals per day  3 snacks per day, snacks at least 2 hours apart from meals  Patients Response to Instruction:  Comprehensiongood  Motivationgood  Expected Compliancegood     Start- Stop: 3:00 pm - 4:04 pm  Total Minutes: 64 Minutes  Group or Individual Instruction: MNT - I  Other: Lissette Brennan PACasandraC    Thank you for coming to the [facility] for education today  Please feel free to call with any questions or concerns      Amber Segura, [address] [facility] [address]  [address] [facility] [id number]

## 2019-10-15 NOTE — TELEPHONE ENCOUNTER
----- Message from Alexandru Mckenna MD sent at 10/15/2019 12:39 PM EDT -----  Pt's Patient's stress test is normal    Patient can keep regular appointment  Please call patient with the result

## 2019-10-17 LAB
CHEST PAIN STATEMENT: NORMAL
MAX DIASTOLIC BP: 84 MMHG
MAX HEART RATE: 86 BPM
MAX PREDICTED HEART RATE: 161 BPM
MAX. SYSTOLIC BP: 156 MMHG
PROTOCOL NAME: NORMAL
REASON FOR TERMINATION: NORMAL
TARGET HR FORMULA: NORMAL
TEST INDICATION: NORMAL
TIME IN EXERCISE PHASE: NORMAL

## 2019-10-22 ENCOUNTER — PROCEDURE VISIT (OUTPATIENT)
Dept: PODIATRY | Facility: CLINIC | Age: 59
End: 2019-10-22
Payer: COMMERCIAL

## 2019-10-22 VITALS
HEART RATE: 70 BPM | SYSTOLIC BLOOD PRESSURE: 126 MMHG | BODY MASS INDEX: 34.06 KG/M2 | HEIGHT: 70 IN | WEIGHT: 237.9 LBS | DIASTOLIC BLOOD PRESSURE: 70 MMHG

## 2019-10-22 DIAGNOSIS — E11.42 DIABETIC POLYNEUROPATHY ASSOCIATED WITH TYPE 2 DIABETES MELLITUS (HCC): ICD-10-CM

## 2019-10-22 DIAGNOSIS — L97.529 ULCER OF LEFT FOOT, UNSPECIFIED ULCER STAGE (HCC): Primary | ICD-10-CM

## 2019-10-22 PROCEDURE — 99213 OFFICE O/P EST LOW 20 MIN: CPT | Performed by: PODIATRIST

## 2019-10-22 NOTE — PROGRESS NOTES
PATIENT:  Charlene Morales    1960    ASSESSMENT:     1  Ulcer of left foot, unspecified ulcer stage ([place] [address] )     2  Diabetic polyneuropathy associated with type 2 diabetes mellitus ([place] [address] )           PLAN:    Patient was counseled on the condition and diagnosis  Educated disease prevention and risks related to diabetes and neuropathy  Educated proper daily foot care and exam   Instructed proper skin care / protection and footwear  Instructed to identify any signs of infection and related foot problem  Discussed proper blood glucose control with diet and exercise  Wound looks healed and okay to advance to DM shoes  Instructed skin care and monitoring for any recurring ulcer  Recommended band aid for protection  The patient will return in 6 weeks for diabetic foot exam and care  Subjective:        HPI: The patients presents with his wife for wound care  No drainage at this time  No foot pain  Denied any redness or edema  He presents with DM shoes  The following portions of the patient's history were reviewed and updated as appropriate: allergies, current medications, past family history, past medical history, past social history, past surgical history and problem list   All pertinent labs and images were reviewed      Past Medical History  Past Medical History:   Diagnosis Date    Diabetes mellitus ([place] [address] )     Hypercholesteremia     Hyperlipidemia     Hypertension     Neuropathy     Obesity     Osteomyelitis ([place] [address] )     last assessed 11/4/16    PVC's (premature ventricular contractions)     sees cardiology Dr Patrick camargo    Stroke [facility])     last weeof July 2018 [place]       Past Surgical History  Past Surgical History:   Procedure Laterality Date    ABDOMINAL SURGERY      CHOLECYSTECTOMY      Percutaneous    OTHER SURGICAL HISTORY      "stimulator to control bowel movements"    [place] ESOPHAGOGASTRODUODENOSCOPY TRANSORAL DIAGNOSTIC N/A 9/27/2016    Procedure: ESOPHAGOGASTRODUODENOSCOPY (EGD); Surgeon: Collins Smith MD;  [facility]; Service: Gastroenterology    [place] LAP,CHOLECYSTECTOMY N/A 2/29/2016    Procedure: LAPAROSCOPIC CHOLECYSTECTOMY ;  Surgeon: Kendy Ritchie DO;  [facility];  Service: General    ROTATOR CUFF REPAIR Right     TOE AMPUTATION Right 10/28/2016    Procedure: 3RD TOE AMPUTATION ;  Surgeon: Ok Reeves DPM;  [facility];  Service:         Allergies:  Patient has no known allergies  Medications:  Current Outpatient Medications   Medication Sig Dispense Refill    amLODIPine (NORVASC) 5 mg tablet Take 1 tablet (5 mg total) by mouth every 12 (twelve) hours 180 tablet 1    aspirin (ECOTRIN LOW STRENGTH) 81 mg EC tablet Take 81 mg by mouth daily Resume on 8/14      atorvastatin (LIPITOR) 80 mg tablet Take 1 tablet (80 mg total) by mouth daily with dinner 90 tablet 1    Blood Glucose Monitoring Suppl (ONE TOUCH ULTRA MINI) w/Device KIT by Does not apply route 3 (three) times a day 1 each 0    Blood Pressure Monitoring (BLOOD PRESSURE CUFF) MISC Use to check blood pressure before taking blood pressure medication and 1 hour after and follow instructions provided in discharge instructions based on the readings   1 each 0    carvedilol (COREG) 6 25 mg tablet Take 1 tablet (6 25 mg total) by mouth 2 (two) times a day with meals 180 tablet 1    Cholecalciferol (VITAMIN D3) 04690 units CAPS Take 1 capsule (50,000 Units total) by mouth once a week 5 capsule 5    CVS VITAMIN B-12 1000 MCG tablet TAKE 1 TABLET BY MOUTH EVERY DAY 90 tablet 1    diphenoxylate-atropine (LOMOTIL) 2 5-0 025 mg per tablet Take 1 tablet by mouth 4 (four) times a day as needed for diarrhea      escitalopram (LEXAPRO) 10 mg tablet Take 1 tablet (10 mg total) by mouth daily at bedtime 90 tablet 0    famotidine (PEPCID) 20 mg tablet Take 20 mg by mouth daily Resume on 8/14      gabapentin (NEURONTIN) 250 mg/5 mL solution Take 12 mL (600 mg total) by mouth daily at bedtime 470 mL 2    glucagon (GLUCAGON EMERGENCY) 1 MG injection Inject 1 mg under the skin once as needed for low blood sugar for up to 1 dose 1 kit 2    glucose blood (ONE TOUCH ULTRA TEST) test strip 1 each by Other route 3 (three) times a day Use as instructed 270 each 1    Incontinence Supplies (MALE URINAL) MISC by Does not apply route daily 6 each 3    insulin aspart (NovoLOG) 100 units/mL injection Inject 4 Units under the skin 3 (three) times a day before meals Plus scale      insulin glargine (LANTUS) 100 units/mL subcutaneous injection Inject 20 Units under the skin daily 10 mL 0    Insulin Syringe-Needle U-100 (B-D INS SYRINGE 0 5CC/30GX1/2") 30G X 1/2" 0 5 ML MISC Inject under the skin 4 (four) times a day 360 each 1    LUCENTIS 0 3 MG/0 05ML       ONETOUCH DELICA LANCETS 14F MISC by Does not apply route 3 (three) times a day 270 each 1    SBI/Protein Isolate (ENTERAGAM) 5 g PACK Take by mouth      glucose 4 g chewable tablet Chew 3 tablets (12 g total) as needed for low blood sugar (Patient not taking: Reported on 10/7/2019) 50 tablet 0    Insulin Syringe-Needle U-100 (B-D INS SYR ULTRAFINE  3CC/30G) 30G X 1/2" 0 3 ML MISC by Does not apply route 4 (four) times a day (Patient not taking: Reported on 10/7/2019) 360 each 1    mupirocin (BACTROBAN) 2 % cream Apply topically daily (Patient not taking: Reported on 10/7/2019) 30 g 0     No current facility-administered medications for this visit          Social History:  Social History     Socioeconomic History    Marital status: /Civil Union     Spouse name: None    Number of children: None    Years of education: None    Highest education level: None   Occupational History    Occupation: disabled   Social Needs    Financial resource strain: None    Food insecurity:     Worry: None     Inability: None    Transportation needs:     Medical: None     Non-medical: None   Tobacco Use    Smoking status: Never Smoker    Smokeless tobacco: Never Used   Substance and Sexual Activity    Alcohol use: No    Drug use: No    Sexual activity: Not Currently   Lifestyle    Physical activity:     Days per week: None     Minutes per session: None    Stress: None   Relationships    Social connections:     Talks on phone: None     Gets together: None     Attends Nondenominational service: None     Active member of club or organization: None     Attends meetings of clubs or organizations: None     Relationship status: None    Intimate partner violence:     Fear of current or ex partner: None     Emotionally abused: None     Physically abused: None     Forced sexual activity: None   Other Topics Concern    None   Social History Narrative    Daily caffeine consumption 2-3 servings a day        Review of Systems   Constitutional: Negative for chills and fever  Respiratory: Negative for cough and shortness of breath  Cardiovascular: Negative for chest pain and leg swelling  Gastrointestinal: Negative for constipation, diarrhea, nausea and vomiting  Skin: Positive for wound  Neurological: Positive for numbness  Psychiatric/Behavioral: Negative for confusion  Objective:      /70   Pulse 70   Ht 5' 10" (1 778 m)   Wt 108 kg (237 lb 14 4 oz) Comment: per wife pt is in sx shoe  BMI 34 14 kg/m²          Physical Exam   Constitutional: He is oriented to person, place, and time  He appears well-developed and well-nourished  No distress  Cardiovascular: Normal rate, regular rhythm and intact distal pulses  Pulmonary/Chest: Effort normal and breath sounds normal  No respiratory distress  Musculoskeletal:   No acute joint pain or edema  No redness  No ecchymosis  Hammertoe deformity noted  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  Neurological: He is alert and oriented to person, place, and time  A sensory deficit is present  Skin: Capillary refill takes less than 2 seconds  No erythema  No pallor  Superficial eschar on dorsolateral left foot  Wound looks healed  No redness or edema  No pain on palpation  No purulence or drainage  Psychiatric: He has a normal mood and affect  Vitals reviewed

## 2019-10-23 ENCOUNTER — PROCEDURE VISIT (OUTPATIENT)
Dept: UROLOGY | Facility: CLINIC | Age: 59
End: 2019-10-23
Payer: COMMERCIAL

## 2019-10-23 DIAGNOSIS — N32.81 DETRUSOR INSTABILITY: ICD-10-CM

## 2019-10-23 DIAGNOSIS — R39.15 URGENCY OF URINATION: ICD-10-CM

## 2019-10-23 DIAGNOSIS — N39.41 URGE INCONTINENCE: Primary | ICD-10-CM

## 2019-10-23 LAB
SL AMB  POCT GLUCOSE, UA: 4
SL AMB LEUKOCYTE ESTERASE,UA: NEGATIVE
SL AMB POCT BILIRUBIN,UA: NEGATIVE
SL AMB POCT BLOOD,UA: ABNORMAL
SL AMB POCT CLARITY,UA: CLEAR
SL AMB POCT COLOR,UA: YELLOW
SL AMB POCT KETONES,UA: NEGATIVE
SL AMB POCT NITRITE,UA: NEGATIVE
SL AMB POCT PH,UA: 5
SL AMB POCT SPECIFIC GRAVITY,UA: 1.01
SL AMB POCT URINE PROTEIN: 3
SL AMB POCT UROBILINOGEN: NEGATIVE

## 2019-10-23 PROCEDURE — 81002 URINALYSIS NONAUTO W/O SCOPE: CPT

## 2019-10-23 PROCEDURE — 51741 ELECTRO-UROFLOWMETRY FIRST: CPT

## 2019-10-23 PROCEDURE — 51728 CYSTOMETROGRAM W/VP: CPT

## 2019-10-23 PROCEDURE — 51784 ANAL/URINARY MUSCLE STUDY: CPT

## 2019-10-23 PROCEDURE — 51797 INTRAABDOMINAL PRESSURE TEST: CPT

## 2019-10-23 NOTE — PROGRESS NOTES
CC:  "I can't hold it"    Uroflow:       Voided volume:      241 6  ml       Max flow rate:       19 7  ml/sec       Average flow rate:     10 3  ml/sec       PVR:    80 ml       Patient felt volume was: normal    CMG:       Position:  sitting         Fill sensation:  13 ml    pdet 23 cm of H2O           Must urge:       16  ml,     pdet 7 cm of H2O              Capacity:           165 ml,     pdet 8 cm of H2O             Max pdet during void     75  cm H2O       Voided volume:    128 ml       Bladder stability:   unstable at 14, 23, 44ml, and rhythmically through rest of test ml with leakage at 101 ml       Compliance:  normal       EMG activity:     Unable to completely assess due to patient moving bowels prior to test and at end of test, even after replacing sensors  Comments:    Sensory urgency, DI, urge incontinence   Stopped fill at 165 ml due to patient c/o of pain   Patient moved bowels in commode prior to fill, sensors replaced, rectal catheter reinserted and fill started  Patient had rhythmic detrusor contractions throughout testing  During voiding, patient was straining and bending over to empty, he states he normally voids this way         Urodynamics  Date/Time: 10/23/2019 4:40 PM  Performed by: Marlene Anaya RN  Authorized by: Allison Worthington MD   Procedure - Urodynamics:  Procedure details: CMG      Voiding Pressure Study: Yes    Intra-abdominal Voiding Pressure Study: Yes    Post-procedure:     Patient tolerance: Patient tolerated procedure well with no immediate complications

## 2019-10-28 DIAGNOSIS — Z79.4 TYPE 2 DIABETES MELLITUS WITH HYPERGLYCEMIA, WITH LONG-TERM CURRENT USE OF INSULIN (HCC): Primary | ICD-10-CM

## 2019-10-28 DIAGNOSIS — E11.65 TYPE 2 DIABETES MELLITUS WITH HYPERGLYCEMIA, WITH LONG-TERM CURRENT USE OF INSULIN (HCC): Primary | ICD-10-CM

## 2019-10-28 RX ORDER — BLOOD-GLUCOSE TRANSMITTER
EACH MISCELLANEOUS
Qty: 1 EACH | Refills: 3 | Status: SHIPPED | OUTPATIENT
Start: 2019-10-28 | End: 2022-07-14

## 2019-10-28 RX ORDER — BLOOD-GLUCOSE SENSOR
EACH MISCELLANEOUS
Qty: 1 EACH | Refills: 11 | Status: SHIPPED | OUTPATIENT
Start: 2019-10-28 | End: 2022-07-14

## 2019-10-28 RX ORDER — BLOOD-GLUCOSE,RECEIVER,CONT
EACH MISCELLANEOUS
Qty: 1 DEVICE | Refills: 0 | Status: SHIPPED | OUTPATIENT
Start: 2019-10-28 | End: 2022-07-14

## 2019-10-28 NOTE — TELEPHONE ENCOUNTER
Received fax from [facility] to send [facility] to local pharmacy

## 2019-11-05 ENCOUNTER — TELEPHONE (OUTPATIENT)
Dept: ENDOCRINOLOGY | Facility: CLINIC | Age: 59
End: 2019-11-05

## 2019-11-05 NOTE — TELEPHONE ENCOUNTER
Received fax from [facility] who is trying to reach patient  Spoke with patients spouse and gave [email or web address] phone number to contact

## 2019-11-26 ENCOUNTER — OFFICE VISIT (OUTPATIENT)
Dept: INTERNAL MEDICINE CLINIC | Facility: CLINIC | Age: 59
End: 2019-11-26
Payer: COMMERCIAL

## 2019-11-26 VITALS
DIASTOLIC BLOOD PRESSURE: 80 MMHG | TEMPERATURE: 98.1 F | BODY MASS INDEX: 34.53 KG/M2 | SYSTOLIC BLOOD PRESSURE: 136 MMHG | HEART RATE: 76 BPM | WEIGHT: 241.2 LBS | HEIGHT: 70 IN | OXYGEN SATURATION: 96 %

## 2019-11-26 DIAGNOSIS — F68.8 PERSONALITY CHANGE: Primary | ICD-10-CM

## 2019-11-26 DIAGNOSIS — I63.312 CEREBROVASCULAR ACCIDENT (CVA) DUE TO THROMBOSIS OF LEFT MIDDLE CEREBRAL ARTERY (HCC): ICD-10-CM

## 2019-11-26 DIAGNOSIS — N18.4 BENIGN HYPERTENSION WITH CKD (CHRONIC KIDNEY DISEASE) STAGE IV (HCC): ICD-10-CM

## 2019-11-26 DIAGNOSIS — R41.3 MEMORY DISORDER: ICD-10-CM

## 2019-11-26 DIAGNOSIS — I12.9 BENIGN HYPERTENSION WITH CKD (CHRONIC KIDNEY DISEASE) STAGE IV (HCC): ICD-10-CM

## 2019-11-26 PROCEDURE — 99214 OFFICE O/P EST MOD 30 MIN: CPT | Performed by: INTERNAL MEDICINE

## 2019-11-26 PROCEDURE — 1036F TOBACCO NON-USER: CPT | Performed by: INTERNAL MEDICINE

## 2019-11-26 NOTE — Clinical Note
Hi Stephany's wife is worried about some recent memory and personality changes that Carlos Mejia has hadNo other new neurologic symptoms  He scored a 16/30 on MOCA    I ordered an MRI brain and was hoping(as was his wife) for him to see you at your soonest availability for an apptthx-Raj

## 2019-11-26 NOTE — PROGRESS NOTES
Assessment/Plan:     Diagnoses and all orders for this visit:    Personality change  Comments:  etiology of symptoms unclear -> new CVA? dementia related? related to previous CVA? MRI brain asap and neuro re-eval,msg sent to pt's neurologist & pt will call  Orders:  -     MRI brain wo contrast; Future    Memory disorder  Comments:  plan as above, precautions advised & to exercise, play card games/crosswords, etc  Orders:  -     MRI brain wo contrast; Future    Benign hypertension with CKD (chronic kidney disease) stage IV (HCC)  Comments:  [facility]  defer treatment and work up to nephrology office    Cerebrovascular accident (CVA) due to thrombosis of left middle cerebral artery ([address] )  Comments:  from >1 year ago  plan as above with imaging and neuro eval  Orders:  -     MRI brain wo contrast; Future        BMI Counseling: Body mass index is 34 61 kg/m²  The BMI is above normal  Exercise recommendations include exercising 3-5 times per week  Spent 30 mins with patient including >50% of time counseling/coordination of care      Subjective:      Patient ID: Lana Coronado is a 61 y o  male  HPI    Here with wife who c/o memory & personality changes  Complicated medical history including CVA last year which affected his right side  Wife noticed these symptoms recently as did patient including forgetting one of his daughter's names on a few occassions  He cannot remember driving to familiar places(wife reports he was cleared to return to drive by neurologist)  He denies headaches, fever, vision or hearing changes  He is taking SSRI and denies depression/anxiety  She stated his personality has changed such as his laugh which is very loud  He declines flu vaccine and sits at home all day watching TV  He does not exercise  He forgot his new address(moved in last approx 1 year) and year  MOCA score 16/30(finished high school)    Wife is trying to get him in to see his neurologist   Had MRI brain 8/2018 after having stroke  No new arm/leg or face weakness or numbness & no other neurologic symptoms  No problems urinating and seeing nephrology for ongoing care of CKD stage 4  No other complaints  Past Medical History:   Diagnosis Date    Diabetes mellitus [facility] )     Hypercholesteremia     Hyperlipidemia     Hypertension     Neuropathy     Obesity     Osteomyelitis ([facility] )     last assessed 11/4/16    PVC's (premature ventricular contractions)     sees cardiology Dr Jada camargo    Stroke [facility])     last weeof July 2018 [facility]     Vitals:    11/26/19 1508 11/26/19 1548   BP: 144/84 136/80   BP Location: Left arm Left arm   Patient Position: Sitting Sitting   Cuff Size: Adult Adult   Pulse: 78 76   Temp: 98 1 °F (36 7 °C)    SpO2: 97% 96%   Weight: 109 kg (241 lb 3 2 oz)    Height: 5' 10" (1 778 m)      Body mass index is 34 61 kg/m²  Current Outpatient Medications:     amLODIPine (NORVASC) 5 mg tablet, Take 1 tablet (5 mg total) by mouth every 12 (twelve) hours, Disp: 180 tablet, Rfl: 1    aspirin (ECOTRIN LOW STRENGTH) 81 mg EC tablet, Take 81 mg by mouth daily Resume on 8/14, Disp: , Rfl:     atorvastatin (LIPITOR) 80 mg tablet, Take 1 tablet (80 mg total) by mouth daily with dinner, Disp: 90 tablet, Rfl: 1    Blood Glucose Monitoring Suppl (ONE TOUCH ULTRA MINI) w/Device KIT, by Does not apply route 3 (three) times a day, Disp: 1 each, Rfl: 0    Blood Pressure Monitoring (BLOOD PRESSURE CUFF) MISC, Use to check blood pressure before taking blood pressure medication and 1 hour after and follow instructions provided in discharge instructions based on the readings  , Disp: 1 each, Rfl: 0    carvedilol (COREG) 6 25 mg tablet, Take 1 tablet (6 25 mg total) by mouth 2 (two) times a day with meals, Disp: 180 tablet, Rfl: 1    Cholecalciferol (VITAMIN D3) 64071 units CAPS, Take 1 capsule (50,000 Units total) by mouth once a week, Disp: 5 capsule, Rfl: 5    Continuous Blood Gluc  (DEXCOM G6 ) LANCE, Use as directed for continuous glucose monitoring, Disp: 1 Device, Rfl: 0    Continuous Blood Gluc Sensor (DEXCOM G6 SENSOR) MISC, Use as directed for continuous glucose monitoring   Change every 10 days, Disp: 1 each, Rfl: 11    Continuous Blood Gluc Transmit (DEXCOM G6 TRANSMITTER) MISC, Use as directed for continuous glucose monitoring-Change every 3 months, Disp: 1 each, Rfl: 3    CVS VITAMIN B-12 1000 MCG tablet, TAKE 1 TABLET BY MOUTH EVERY DAY, Disp: 90 tablet, Rfl: 1    diphenoxylate-atropine (LOMOTIL) 2 5-0 025 mg per tablet, Take 1 tablet by mouth 4 (four) times a day as needed for diarrhea, Disp: , Rfl:     escitalopram (LEXAPRO) 10 mg tablet, Take 1 tablet (10 mg total) by mouth daily at bedtime, Disp: 90 tablet, Rfl: 0    famotidine (PEPCID) 20 mg tablet, Take 20 mg by mouth daily Resume on 8/14, Disp: , Rfl:     gabapentin (NEURONTIN) 250 mg/5 mL solution, Take 12 mL (600 mg total) by mouth daily at bedtime, Disp: 470 mL, Rfl: 2    glucagon (GLUCAGON EMERGENCY) 1 MG injection, Inject 1 mg under the skin once as needed for low blood sugar for up to 1 dose, Disp: 1 kit, Rfl: 2    glucose 4 g chewable tablet, Chew 3 tablets (12 g total) as needed for low blood sugar (Patient not taking: Reported on 10/7/2019), Disp: 50 tablet, Rfl: 0    glucose blood (ONE TOUCH ULTRA TEST) test strip, 1 each by Other route 3 (three) times a day Use as instructed, Disp: 270 each, Rfl: 1    Incontinence Supplies (MALE URINAL) MISC, by Does not apply route daily, Disp: 6 each, Rfl: 3    insulin aspart (NovoLOG) 100 units/mL injection, Inject 4 Units under the skin 3 (three) times a day before meals Plus scale, Disp: , Rfl:     insulin glargine (LANTUS) 100 units/mL subcutaneous injection, Inject 20 Units under the skin daily, Disp: 10 mL, Rfl: 0    Insulin Syringe-Needle U-100 (B-D INS SYR ULTRAFINE  3CC/30G) 30G X 1/2" 0 3 ML MISC, by Does not apply route 4 (four) times a day (Patient not taking: Reported on 10/7/2019), Disp: 360 each, Rfl: 1    Insulin Syringe-Needle U-100 (B-D INS SYRINGE 0 5CC/30GX1/2") 30G X 1/2" 0 5 ML MISC, Inject under the skin 4 (four) times a day, Disp: 360 each, Rfl: 1    LUCENTIS 0 3 MG/0 05ML, , Disp: , Rfl:     mupirocin (BACTROBAN) 2 % cream, Apply topically daily (Patient not taking: Reported on 10/7/2019), Disp: 30 g, Rfl: 0    ONETOUCH DELICA LANCETS 34L MISC, by Does not apply route 3 (three) times a day, Disp: 270 each, Rfl: 1    SBI/Protein Isolate (ENTERAGAM) 5 g PACK, Take by mouth, Disp: , Rfl:   No Known Allergies    Review of Systems   Constitutional: Negative for fever  HENT: Negative for congestion  Eyes: Positive for visual disturbance (wears glasses with adequate correction)  Respiratory: Negative for shortness of breath  Cardiovascular: Negative for chest pain  Gastrointestinal: Negative for abdominal pain  Endocrine: Negative for polyuria  Genitourinary: Negative for dysuria  Musculoskeletal: Negative for arthralgias  Skin: Negative for rash  Allergic/Immunologic: Negative for immunocompromised state  Neurological: Negative for headaches  Psychiatric/Behavioral: Positive for confusion  Negative for dysphoric mood  The patient is not nervous/anxious  Objective:      /80 (BP Location: Left arm, Patient Position: Sitting, Cuff Size: Adult)   Pulse 76   Temp 98 1 °F (36 7 °C)   Ht 5' 10" (1 778 m)   Wt 109 kg (241 lb 3 2 oz)   SpO2 96%   BMI 34 61 kg/m²          Physical Exam   Constitutional: He appears well-developed and well-nourished  +obese   HENT:   Head: Normocephalic and atraumatic  Eyes: Pupils are equal, round, and reactive to light  Cardiovascular: Normal rate and regular rhythm  Pulmonary/Chest: Effort normal and breath sounds normal  He has no wheezes  He has no rales  Abdominal: Soft  Bowel sounds are normal  There is no tenderness  Musculoskeletal: He exhibits no edema  Neurological: He is alert  He is disoriented (AAO x2(disoriented to time))  He displays no tremor  Gait normal    Motor strength 5/5 UE & LE b/l(minimal change from right side to left side s/p CVA)  Pt is able to sit/stand and ambulate in and out of office without assistance or difficulty   Psychiatric: He has a normal mood and affect  His behavior is normal  He exhibits abnormal recent memory and abnormal remote memory  Vitals reviewed          Lab Results   Component Value Date    HGBA1C 7 3 (H) 10/12/2019

## 2019-12-10 ENCOUNTER — HOSPITAL ENCOUNTER (OUTPATIENT)
Dept: RADIOLOGY | Facility: HOSPITAL | Age: 59
Discharge: HOME | End: 2019-12-10
Payer: COMMERCIAL

## 2019-12-10 DIAGNOSIS — I63.312 CEREBROVASCULAR ACCIDENT (CVA) DUE TO THROMBOSIS OF LEFT MIDDLE CEREBRAL ARTERY (HCC): ICD-10-CM

## 2019-12-10 DIAGNOSIS — F68.8 PERSONALITY CHANGE: ICD-10-CM

## 2019-12-10 DIAGNOSIS — R41.3 MEMORY DISORDER: ICD-10-CM

## 2019-12-10 PROCEDURE — 70551 MRI BRAIN STEM W/O DYE: CPT

## 2019-12-11 ENCOUNTER — TELEPHONE (OUTPATIENT)
Dept: INTERNAL MEDICINE CLINIC | Facility: CLINIC | Age: 59
End: 2019-12-11

## 2019-12-11 NOTE — TELEPHONE ENCOUNTER
----- Message from Tahmina Goins DO sent at 12/10/2019  7:05 PM EST -----  MRI results are back   shows previous stroke Jesus Freitas had but no new changes    Please c/w our plan of seeing neurology, thank you

## 2019-12-19 NOTE — PROGRESS NOTES
Problem List Items Addressed This Visit        Genitourinary    Overactive bladder    Relevant Orders    Case request operating room: INJECTION BOTULINUM TOXIN (BOTOX), intradestrusor (Completed)       Other    Cognitive impairment    History of stroke    Urge incontinence - Primary    Relevant Orders    POCT Measure PVR (Completed)    POCT urine dip (Completed)    Case request operating room: INJECTION BOTULINUM TOXIN (BOTOX), intradestrusor (Completed)            Discussion:  I had a very nice visit with Natalie Kramer today  He does have some cognitive impairment, and is almost child like in his interactions with me today, showing a great amount of anxiety, and some emotional lability as well  I suspect that this is due to his history of stroke  I did review with him his urodynamic studies which show a significant amount of detrusor instability and overactivity, and a max urinary flow within the normal range of between 15-20  His uroflow today shows 7 milliliters/second, but it is due to a low voided volume, he did urinate a large volume on the floor today, unfortunately, as well  His cystoscopic evaluation today is negative for lesions or tumors or defects  His main issue is a overactive bladder with urge incontinence  Likely due to decreased inhibitory tone from the cerebrum after his stroke  I explained to him cystoscopy with botulinum toxin injection, and the risks and benefits alternatives of this, including a risk of urinary retention, need to wear a catheter, and the risks of infection, bleeding, pain, damage to surrounding structures, need for additional procedures, risk of failure of the procedure, risk of anesthesia, risk of positioning complications including neurapraxia, chronic pain, and paralysis as well as risk of rhabdomyolysis and compartment syndrome    Risk of deep venous thrombosis and venous thromboembolism as well as pneumonia and other potential unpredictable complications were also discussed with the patient  Informed consent for botulinum toxin injection was performed, he does understand that he will need this more than once in the future  I did tell him that this will not necessarily completely lemonade his incontinence, but given his urodynamic findings will likely be quite useful in decreasing his subjective suffering from his urinary symptoms    Assessment and plan:       Please see problem oriented charting for the assessment plan of today's urological complaints    Umer Parker MD      Chief Complaint     Chief Complaint   Patient presents with    Cystoscopy     urinary incontinence    Overactive bladder  Encounter to review tests      History of Present Illness     Shara Pretty is a 61 y o  gentleman that has been seen by our office previously for refractory overactive bladder with urge incontinence and frequency and urgency       Reviewed with him in detail in real time his CMG graffs and the following information:     Uroflow:       Voided volume:      241 6  ml       Max flow rate:       19 7  ml/sec       Average flow rate:     10 3  ml/sec       PVR:    80 ml       Patient felt volume was: normal     CMG:       Position:  sitting         Fill sensation:  13 ml    pdet 23 cm of H2O           Must urge:       16  ml,     pdet 7 cm of H2O              Capacity:           165 ml,     pdet 8 cm of H2O             Max pdet during void     75  cm H2O       Voided volume:    128 ml       Bladder stability:   unstable at 14, 23, 44ml, and rhythmically through rest of test ml with leakage at 101 ml       Compliance:  normal       EMG activity:     Unable to completely assess due to patient moving bowels prior to test and at end of test, even after replacing sensors      Comments:               Sensory urgency, DI, urge incontinence              Stopped fill at 165 ml due to patient c/o of pain              Patient moved bowels in commode prior to fill, sensors replaced, rectal catheter reinserted and fill started  Patient had rhythmic detrusor contractions throughout testing  During voiding, patient was straining and bending over to empty, he states he normally voids this way  After review of this information he states that he cannot go on in the current situation and must do something, I believe that the best treatment for him is botulinum toxin injection, and he agrees after making his informed decision  No new urologic complaints today  The following portions of the patient's history were reviewed and updated as appropriate: allergies, current medications, past family history, past medical history, past social history, past surgical history and problem list     Detailed Urologic History     - please refer to HPI    Review of Systems     Review of Systems   Constitutional: Negative  HENT: Negative  Eyes: Negative  Respiratory: Negative  Cardiovascular: Negative  Gastrointestinal: Negative  Endocrine: Negative  Genitourinary: Positive for frequency and urgency  Musculoskeletal: Negative  Skin: Negative  Allergic/Immunologic: Negative  Neurological: Negative  Hematological: Negative  Psychiatric/Behavioral: The patient is nervous/anxious  Allergies     No Known Allergies    Physical Exam     Physical Exam   Constitutional: He is oriented to person, place, and time  He appears well-developed and well-nourished  No distress  HENT:   Head: Normocephalic and atraumatic  Eyes: Right eye exhibits no discharge  Left eye exhibits no discharge  Neck: No tracheal deviation present  Cardiovascular: Intact distal pulses  Pulmonary/Chest: Effort normal  No stridor  No respiratory distress  Abdominal: Soft  He exhibits no distension and no mass  There is no tenderness  There is no rebound and no guarding  No hernia     Genitourinary:   Genitourinary Comments: Uncircumcised phallus, normal testes, penis is tender   Musculoskeletal: He exhibits no edema, tenderness or deformity  Neurological: He is alert and oriented to person, place, and time  No cranial nerve deficit  Coordination normal    Skin: Skin is warm and dry  No rash noted  He is not diaphoretic  No erythema  No pallor  Psychiatric:   The patient has a nervous affect, he perseverates as well   Nursing note and vitals reviewed  Vital Signs  Vitals:    12/20/19 1339   BP: 140/90   BP Location: Left arm   Patient Position: Sitting   Cuff Size: Standard   Pulse: 97   Weight: 111 kg (244 lb)   Height: 5' 10" (1 778 m)         Current Medications       Current Outpatient Medications:     amLODIPine (NORVASC) 5 mg tablet, Take 1 tablet (5 mg total) by mouth every 12 (twelve) hours, Disp: 180 tablet, Rfl: 1    aspirin (ECOTRIN LOW STRENGTH) 81 mg EC tablet, Take 81 mg by mouth daily Resume on 8/14, Disp: , Rfl:     atorvastatin (LIPITOR) 80 mg tablet, Take 1 tablet (80 mg total) by mouth daily with dinner, Disp: 90 tablet, Rfl: 1    Blood Glucose Monitoring Suppl (ONE TOUCH ULTRA MINI) w/Device KIT, by Does not apply route 3 (three) times a day, Disp: 1 each, Rfl: 0    Blood Pressure Monitoring (BLOOD PRESSURE CUFF) MISC, Use to check blood pressure before taking blood pressure medication and 1 hour after and follow instructions provided in discharge instructions based on the readings  , Disp: 1 each, Rfl: 0    carvedilol (COREG) 6 25 mg tablet, Take 1 tablet (6 25 mg total) by mouth 2 (two) times a day with meals, Disp: 180 tablet, Rfl: 1    Cholecalciferol (VITAMIN D3) 37422 units CAPS, Take 1 capsule (50,000 Units total) by mouth once a week, Disp: 5 capsule, Rfl: 5    Continuous Blood Gluc  (DEXCOM G6 ) LANCE, Use as directed for continuous glucose monitoring, Disp: 1 Device, Rfl: 0    Continuous Blood Gluc Sensor (DEXCOM G6 SENSOR) MISC, Use as directed for continuous glucose monitoring   Change every 10 days, Disp: 1 each, Rfl: 11    Continuous Blood Gluc Transmit (DEXCOM G6 TRANSMITTER) MISC, Use as directed for continuous glucose monitoring-Change every 3 months, Disp: 1 each, Rfl: 3    CVS VITAMIN B-12 1000 MCG tablet, TAKE 1 TABLET BY MOUTH EVERY DAY, Disp: 90 tablet, Rfl: 1    diphenoxylate-atropine (LOMOTIL) 2 5-0 025 mg per tablet, Take 1 tablet by mouth 4 (four) times a day as needed for diarrhea, Disp: , Rfl:     escitalopram (LEXAPRO) 10 mg tablet, Take 1 tablet (10 mg total) by mouth daily at bedtime, Disp: 90 tablet, Rfl: 0    famotidine (PEPCID) 20 mg tablet, Take 20 mg by mouth daily Resume on 8/14, Disp: , Rfl:     gabapentin (NEURONTIN) 250 mg/5 mL solution, Take 12 mL (600 mg total) by mouth daily at bedtime, Disp: 470 mL, Rfl: 2    glucagon (GLUCAGON EMERGENCY) 1 MG injection, Inject 1 mg under the skin once as needed for low blood sugar for up to 1 dose, Disp: 1 kit, Rfl: 2    glucose 4 g chewable tablet, Chew 3 tablets (12 g total) as needed for low blood sugar, Disp: 50 tablet, Rfl: 0    glucose blood (ONE TOUCH ULTRA TEST) test strip, 1 each by Other route 3 (three) times a day Use as instructed, Disp: 270 each, Rfl: 1    Incontinence Supplies (MALE URINAL) MISC, by Does not apply route daily, Disp: 6 each, Rfl: 3    insulin aspart (NovoLOG) 100 units/mL injection, Inject 4 Units under the skin 3 (three) times a day before meals Plus scale, Disp: , Rfl:     insulin glargine (LANTUS) 100 units/mL subcutaneous injection, Inject 20 Units under the skin daily, Disp: 10 mL, Rfl: 0    Insulin Syringe-Needle U-100 (B-D INS SYR ULTRAFINE  3CC/30G) 30G X 1/2" 0 3 ML MISC, by Does not apply route 4 (four) times a day, Disp: 360 each, Rfl: 1    Insulin Syringe-Needle U-100 (B-D INS SYRINGE 0 5CC/30GX1/2") 30G X 1/2" 0 5 ML MISC, Inject under the skin 4 (four) times a day, Disp: 360 each, Rfl: 1    LUCENTIS 0 3 MG/0 05ML, , Disp: , Rfl:     mupirocin (BACTROBAN) 2 % cream, Apply topically daily, Disp: 30 g, Rfl: 0    ONETOUCH DELICA LANCETS 88K MISC, by Does not apply route 3 (three) times a day, Disp: 270 each, Rfl: 1    SBI/Protein Isolate (ENTERAGAM) 5 g PACK, Take by mouth, Disp: , Rfl:       Active Problems     Patient Active Problem List   Diagnosis    Type 2 diabetes mellitus with hyperglycemia, with long-term current use of insulin (HCC)    Mixed hyperlipidemia    Benign hypertension with CKD (chronic kidney disease) stage IV (HCC)    Persistent proteinuria    Cerebrovascular accident (CVA) due to thrombosis of left middle cerebral artery (HCC)    Cognitive impairment    Elevated alkaline phosphatase level    Stage 4 chronic kidney disease (HCC)    Diabetic macular edema (HCC)    GERD (gastroesophageal reflux disease)    Cirrhosis ([facility] )    Diabetic polyneuropathy associated with type 2 diabetes mellitus (HCC)    Other constipation    Abnormal EEG    History of stroke    TIA (transient ischemic attack)    Stroke-like symptoms, with right-sided weakness    symptomatic hypoglycemia    Cellulitis    Anxiety associated with depression    Hypertension    Hypothermia    Urge incontinence    Overactive bladder         Past Medical History     Past Medical History:   Diagnosis Date    Diabetes mellitus ([facility] )     Hypercholesteremia     Hyperlipidemia     Hypertension     Neuropathy     Obesity     [facility] )     last assessed 11/4/16    PVC's (premature ventricular contractions)     sees cardiology Dr Ponce camargo    Stroke [facility])     last weeof July 2018 [address]         Surgical History     Past Surgical History:   Procedure Laterality Date    ABDOMINAL SURGERY      CHOLECYSTECTOMY      Percutaneous    CYSTOSCOPY      OTHER SURGICAL HISTORY      "stimulator to control bowel movements"    NM ESOPHAGOGASTRODUODENOSCOPY TRANSORAL DIAGNOSTIC N/A 9/27/2016    Procedure: ESOPHAGOGASTRODUODENOSCOPY (EGD);   Surgeon: Wolfgang Cardenas MD;  Location: [facility]; Service: Gastroenterology    [place] LAP,CHOLECYSTECTOMY N/A 2/29/2016    Procedure: LAPAROSCOPIC CHOLECYSTECTOMY ;  Surgeon: Reggie Marques DO;  Location: [facility];  Service: General    ROTATOR CUFF REPAIR Right     TOE AMPUTATION Right 10/28/2016    Procedure: 3RD TOE AMPUTATION ;  Surgeon: James Mortensen DPM;  [facility];  Service:          Family History     Family History   Problem Relation Age of Onset    Leukemia Mother     Liver disease Mother     Lung cancer Mother         heavy smoker - 3 ppd    Heart disease Father     Liver disease Father     Multiple myeloma Sister     Breast cancer Sister     Urolithiasis Family     Alcohol abuse Neg Hx     Depression Neg Hx     Drug abuse Neg Hx     Substance Abuse Neg Hx     Mental illness Neg Hx          Social History     Social History     Social History     Tobacco Use   Smoking Status Never Smoker   Smokeless Tobacco Never Used         Pertinent Lab Values     Lab Results   Component Value Date    CREATININE 3 30 (H) 10/12/2019       No results found for: PSA          Pertinent Imaging      Extensive review of CMG is performed today

## 2019-12-19 NOTE — PATIENT INSTRUCTIONS
OnabotulinumtoxinA (By injection)   OnabotulinumtoxinA (el-j-iyk-lo-DQA-jcz-tox-in-ay)  Treats muscle stiffness, muscle spasms, excessive sweating, overactive bladder, or loss of bladder control  Prevents chronic migraine headaches  Improves the appearance of wrinkles on the face  Brand Name(s): Botox, Botox Cosmetic   There may be other brand names for this medicine  When This Medicine Should Not Be Used: This medicine is not right for everyone  You should not receive this medicine if you had an allergic reaction to onabotulinumtoxinA or any other botulinum toxin product  How to Use This Medicine:   Injectable  · Your doctor will prescribe your exact dose and tell you how often it should be given  This medicine is given by a healthcare provider as a shot under your skin or into a muscle  · You may be given medicine to numb the area where the shot will be injected  If you receive the medicine around your eyes, you may be given eye drops or ointment to numb the area  After your injection, you may need to wear a protective contact lens or eye patch  · If you are being treated for excessive sweating, shave your underarms but do not use deodorant for 24 hours before your injection  Avoid exercise, hot foods or liquids, or anything else that could make you sweat for 30 minutes before your injection  · The recommended treatment schedule for chronic migraine is every 12 weeks  · This medicine works slowly  Once your condition has improved, the medicine will last about 3 months, then the effects will slowly go away  You might need more injections to treat your condition  ¨ Muscle spasms in the eyelids should improve within 3 to 10 days  ¨ Eye muscle problems should improve 1 or 2 days after the injection, and the improvement should last for 2 to 6 weeks  ¨ Neck pain should improve within 2 to 6 weeks  ¨ Arm stiffness should improve within 4 to 6 weeks    ¨ Facial lines or wrinkles should improve 1 or 2 days   · This medicine should come with a Medication Guide  Ask your pharmacist for a copy if you do not have one  · Missed dose:Call your doctor or pharmacist for instructions  Drugs and Foods to Avoid:   Ask your doctor or pharmacist before using any other medicine, including over-the-counter medicines, vitamins, and herbal products  · Some foods and medicine can affect how onabotulinumtoxinA works  Tell your doctor if you are using any of the following:  ¨ Aspirin or a blood thinner (such as ticlopidine, warfarin)  ¨ Muscle relaxer  ¨ Medicine for an infection (such as amikacin, gentamicin, streptomycin, tobramycin)  · Tell your doctor if you have received an injection of any botulinum toxin product within the past 4 months  Warnings While Using This Medicine:   · Tell your doctor if you are pregnant or breastfeeding, or if you have breathing or lung problems, bleeding problems, heart or blood vessel disease, or nerve or muscle problems (such as myasthenia gravis)  Tell your doctor if you have ever had face surgery or if you have a urinary tract infection or trouble urinating, diabetes, or multiple sclerosis  · This medicine may cause the following problems:  ¨ Muscle weakness, loss of bladder control, trouble swallowing, speaking, or breathing (caused by the toxin spreading to other parts of your body)  · This medicine may make your muscles weak or cause vision problems  Do not drive or do anything else that could be dangerous until you know how this medicine affects you  · There are some warnings that only apply if you are receiving this medicine to treat the following:   ¨ Injections near the eye: This medicine may reduce blinking, which can raise the risk of eye problems such as corneal exposure and ulcers  Tell your doctor right away if you notice that you are blinking less than usual or your eyes feel dry  ¨ Urinary incontinence:  This medicine may cause autonomic dysreflexia, which can be a life-threatening condition  ¨ Overactive bladder: Check with your doctor right away if you have trouble urinating or a burning sensation while urinating  · This medicine contains products from donated human blood, so it may contain viruses, although the risk is low  Human donors and blood are always tested for viruses to keep the risk low  Talk with your doctor about this risk if you are concerned  · Your doctor will check your progress and the effects of this medicine at regular visits  Keep all appointments  Possible Side Effects While Using This Medicine:   Call your doctor right away if you notice any of these side effects:  · Allergic reaction: Itching or hives, swelling in your face or hands, swelling or tingling in your mouth or throat, chest tightness, trouble breathing  · Blurred or double vision, droopy eyelids  · Change in how much or how often you urinate, trouble urinating, or painful urination  · Chest pain, slow or uneven heartbeat  · Headache, increased sweating, warmth or redness in your face, neck, or arm  · Muscle weakness  · Trouble swallowing, talking, or breathing  If you notice these less serious side effects, talk with your doctor:   · Fever, chills, cough, stuffy or runny nose, sore throat, and body aches  · Pain in your neck, back, arms, or legs  · Redness, pain, tenderness, bruising, swelling, or weakness where the shot was given  If you notice other side effects that you think are caused by this medicine, tell your doctor  Call your doctor for medical advice about side effects  You may report side effects to FDA at 8-031-FDA-8554  © 2017 [employer] Information is for End User's use only and may not be sold, redistributed or otherwise used for commercial purposes  The above information is an  only  It is not intended as medical advice for individual conditions or treatments   Talk to your doctor, nurse or pharmacist before following any medical regimen to see if it is safe and effective for you

## 2019-12-20 ENCOUNTER — PROCEDURE VISIT (OUTPATIENT)
Dept: UROLOGY | Facility: CLINIC | Age: 59
End: 2019-12-20
Payer: COMMERCIAL

## 2019-12-20 VITALS
WEIGHT: 244 LBS | SYSTOLIC BLOOD PRESSURE: 140 MMHG | HEART RATE: 97 BPM | HEIGHT: 70 IN | DIASTOLIC BLOOD PRESSURE: 90 MMHG | BODY MASS INDEX: 34.93 KG/M2

## 2019-12-20 DIAGNOSIS — N39.41 URGE INCONTINENCE: Primary | ICD-10-CM

## 2019-12-20 DIAGNOSIS — Z86.73 HISTORY OF STROKE: ICD-10-CM

## 2019-12-20 DIAGNOSIS — Z71.2 PERSON CONSULTING FOR EXPLANATION OF EXAMINATION OR TEST FINDING: ICD-10-CM

## 2019-12-20 DIAGNOSIS — R41.89 COGNITIVE IMPAIRMENT: ICD-10-CM

## 2019-12-20 DIAGNOSIS — N32.81 OVERACTIVE BLADDER: ICD-10-CM

## 2019-12-20 LAB
POST-VOID RESIDUAL VOLUME, ML POC: 57 ML
SL AMB  POCT GLUCOSE, UA: 500
SL AMB LEUKOCYTE ESTERASE,UA: NORMAL
SL AMB POCT BILIRUBIN,UA: NORMAL
SL AMB POCT BLOOD,UA: NORMAL
SL AMB POCT CLARITY,UA: CLEAR
SL AMB POCT COLOR,UA: YELLOW
SL AMB POCT KETONES,UA: NORMAL
SL AMB POCT NITRITE,UA: NORMAL
SL AMB POCT PH,UA: 5
SL AMB POCT SPECIFIC GRAVITY,UA: 1.02
SL AMB POCT URINE PROTEIN: 2000
SL AMB POCT UROBILINOGEN: 0.2

## 2019-12-20 PROCEDURE — 87086 URINE CULTURE/COLONY COUNT: CPT | Performed by: UROLOGY

## 2019-12-20 PROCEDURE — 51798 US URINE CAPACITY MEASURE: CPT | Performed by: UROLOGY

## 2019-12-20 PROCEDURE — 81002 URINALYSIS NONAUTO W/O SCOPE: CPT | Performed by: UROLOGY

## 2019-12-20 PROCEDURE — 99214 OFFICE O/P EST MOD 30 MIN: CPT | Performed by: UROLOGY

## 2019-12-20 PROCEDURE — 52000 CYSTOURETHROSCOPY: CPT | Performed by: UROLOGY

## 2019-12-20 RX ORDER — GABAPENTIN 100 MG/1
300 CAPSULE ORAL ONCE
Status: CANCELLED | OUTPATIENT
Start: 2019-12-20 | End: 2019-12-20

## 2019-12-20 RX ORDER — ACETAMINOPHEN 325 MG/1
975 TABLET ORAL ONCE
Status: CANCELLED | OUTPATIENT
Start: 2019-12-20 | End: 2019-12-20

## 2019-12-20 NOTE — PROGRESS NOTES
Office Cystoscopy Procedure Note    Indication:     medically refractory lower urinary tract symptoms     Informed consent   The risks, benefits, complications, treatment options, and expected outcomes were discussed with the patient  The patient concurred with the proposed plan and provided informed consent  Anesthesia  Lidocaine jelly 2%    Antibiotic prophylaxis   None    Procedure  The patient was placed in the supineposition, was prepped and draped in the usual manner using sterile technique, and 2% lidocaine jelly instilled into the urethra  A 17 F flexible cystoscope was then inserted into the urethra and the urethra and bladder carefully examined  The following findings were noted:    Findings:  Urethra:  Normal, no stricture, intact sphincter mechanism  Prostate:  Mild lateral lobe hypertrophy, no median lobe, intact bladder neck  Bladder:  Normal mucosa, no saccules, no cellules, no masses or carcinoma in situ  Ureteral orifices:  Orthotopic  Other findings:  None, retroflexed view confirms    Specimens: None                 Complications:    None; patient tolerated the procedure well           Disposition: To home            Condition: Stable    Plan: Negative cystoscopic evaluation, good candidate for botulinum toxin injection  Cystoscopy  Date/Time: 12/20/2019 2:19 PM  Performed by: Dasha Petersen MD  Authorized by: Dasha Petersen MD     Procedure details: cystoscopy    Patient tolerance: Patient tolerated the procedure well with no immediate complications    Additional Procedure Details: Indication:     medically refractory lower urinary tract symptoms     Informed consent   The risks, benefits, complications, treatment options, and expected outcomes were discussed with the patient  The patient concurred with the proposed plan and provided informed consent      Anesthesia  Lidocaine jelly 2%    Antibiotic prophylaxis   None    Procedure  The patient was placed in the supineposition, was prepped and draped in the usual manner using sterile technique, and 2% lidocaine jelly instilled into the urethra  A 17 F flexible cystoscope was then inserted into the urethra and the urethra and bladder carefully examined  The following findings were noted:    Findings:  Urethra:  Normal, no stricture, intact sphincter mechanism  Prostate:  Mild lateral lobe hypertrophy, no median lobe, intact bladder neck  Bladder:  Normal mucosa, no saccules, no cellules, no masses or carcinoma in situ  Ureteral orifices:  Orthotopic  Other findings:  None, retroflexed view confirms    Specimens: None                 Complications:    None; patient tolerated the procedure well           Disposition: To home            Condition: Stable    Plan: Negative cystoscopic evaluation, good candidate for botulinum toxin injection

## 2019-12-20 NOTE — LETTER
December 20, 2019     Tania Buckley, [address] [place] [address] [address]    Patient: Esther Tellez   YOB: 1960   Date of Visit: 12/20/2019       Dear Dr Nicola Hansen:    Thank you for referring Leila Edwards to me for evaluation  Below are my notes for this consultation  If you have questions, please do not hesitate to call me  I look forward to following your patient along with you  Sincerely,        Elvira Holt MD        CC: DO Breezy Orellana RN Petrina Long, MD  12/20/2019  2:19 PM  Sign at close encounter  Office Cystoscopy Procedure Note    Indication:     medically refractory lower urinary tract symptoms     Informed consent   The risks, benefits, complications, treatment options, and expected outcomes were discussed with the patient  The patient concurred with the proposed plan and provided informed consent  Anesthesia  Lidocaine jelly 2%    Antibiotic prophylaxis   None    Procedure  The patient was placed in the supineposition, was prepped and draped in the usual manner using sterile technique, and 2% lidocaine jelly instilled into the urethra  A 17 F flexible cystoscope was then inserted into the urethra and the urethra and bladder carefully examined  The following findings were noted:    Findings:  Urethra:  Normal, no stricture, intact sphincter mechanism  Prostate:  Mild lateral lobe hypertrophy, no median lobe, intact bladder neck  Bladder:  Normal mucosa, no saccules, no cellules, no masses or carcinoma in situ  Ureteral orifices:  Orthotopic  Other findings:  None, retroflexed view confirms    Specimens: None                 Complications:    None; patient tolerated the procedure well           Disposition: To home            Condition: Stable    Plan: Negative cystoscopic evaluation, good candidate for botulinum toxin injection      Cystoscopy  Date/Time: 12/20/2019 2:19 PM  Performed by: Elvira Holt MD  Authorized by: Elvira Holt MD     Procedure details: cystoscopy    Patient tolerance: Patient tolerated the procedure well with no immediate complications    Additional Procedure Details: Indication:     medically refractory lower urinary tract symptoms     Informed consent   The risks, benefits, complications, treatment options, and expected outcomes were discussed with the patient  The patient concurred with the proposed plan and provided informed consent  Anesthesia  Lidocaine jelly 2%    Antibiotic prophylaxis   None    Procedure  The patient was placed in the supineposition, was prepped and draped in the usual manner using sterile technique, and 2% lidocaine jelly instilled into the urethra  A 17 F flexible cystoscope was then inserted into the urethra and the urethra and bladder carefully examined  The following findings were noted:    Findings:  Urethra:  Normal, no stricture, intact sphincter mechanism  Prostate:  Mild lateral lobe hypertrophy, no median lobe, intact bladder neck  Bladder:  Normal mucosa, no saccules, no cellules, no masses or carcinoma in situ  Ureteral orifices:  Orthotopic  Other findings:  None, retroflexed view confirms    Specimens: None                 Complications:    None; patient tolerated the procedure well           Disposition: To home            Condition: Stable    Plan: Negative cystoscopic evaluation, good candidate for botulinum toxin injection                  Danielle Saenz MD  12/20/2019  2:18 PM  Sign at close encounter       Problem List Items Addressed This Visit        Genitourinary    Overactive bladder    Relevant Orders    Case request operating room: INJECTION BOTULINUM TOXIN (BOTOX), intradestrusor (Completed)       Other    Cognitive impairment    History of stroke    Urge incontinence - Primary    Relevant Orders    POCT Measure PVR (Completed)    POCT urine dip (Completed)    Case request operating room: INJECTION BOTULINUM TOXIN (BOTOX), intradestrusor (Completed) Discussion:  I had a very nice visit with Ju Juarez today  He does have some cognitive impairment, and is almost child like in his interactions with me today, showing a great amount of anxiety, and some emotional lability as well  I suspect that this is due to his history of stroke  I did review with him his urodynamic studies which show a significant amount of detrusor instability and overactivity, and a max urinary flow within the normal range of between 15-20  His uroflow today shows 7 milliliters/second, but it is due to a low voided volume, he did urinate a large volume on the floor today, unfortunately, as well  His cystoscopic evaluation today is negative for lesions or tumors or defects  His main issue is a overactive bladder with urge incontinence  Likely due to decreased inhibitory tone from the cerebrum after his stroke  I explained to him cystoscopy with botulinum toxin injection, and the risks and benefits alternatives of this, including a risk of urinary retention, need to wear a catheter, and the risks of infection, bleeding, pain, damage to surrounding structures, need for additional procedures, risk of failure of the procedure, risk of anesthesia, risk of positioning complications including neurapraxia, chronic pain, and paralysis as well as risk of rhabdomyolysis and compartment syndrome  Risk of deep venous thrombosis and venous thromboembolism as well as pneumonia and other potential unpredictable complications were also discussed with the patient  Informed consent for botulinum toxin injection was performed, he does understand that he will need this more than once in the future    I did tell him that this will not necessarily completely lemonade his incontinence, but given his urodynamic findings will likely be quite useful in decreasing his subjective suffering from his urinary symptoms    Assessment and plan:       Please see problem oriented charting for the assessment plan of today's urological complaints    Sigrid Rubio MD      Chief Complaint     Chief Complaint   Patient presents with    Cystoscopy     urinary incontinence    Overactive bladder  Encounter to review tests      History of Present Illness     Kristal Singh is a 61 y o  gentleman that has been seen by our office previously for refractory overactive bladder with urge incontinence and frequency and urgency  Reviewed with him in detail in real time his CMG graffs and the following information:     Uroflow:       Voided volume:      241 6  ml       Max flow rate:       19 7  ml/sec       Average flow rate:     10 3  ml/sec       PVR:    80 ml       Patient felt volume was: normal     CMG:       Position:  sitting         Fill sensation:  13 ml    pdet 23 cm of H2O           Must urge:       16  ml,     pdet 7 cm of H2O              Capacity:           165 ml,     pdet 8 cm of H2O             Max pdet during void     75  cm H2O       Voided volume:    128 ml       Bladder stability:   unstable at 14, 23, 44ml, and rhythmically through rest of test ml with leakage at 101 ml       Compliance:  normal       EMG activity:     Unable to completely assess due to patient moving bowels prior to test and at end of test, even after replacing sensors      Comments:               Sensory urgency, DI, urge incontinence              Stopped fill at 165 ml due to patient c/o of pain              Patient moved bowels in commode prior to fill, sensors replaced, rectal catheter reinserted and fill started  Patient had rhythmic detrusor contractions throughout testing  During voiding, patient was straining and bending over to empty, he states he normally voids this way  After review of this information he states that he cannot go on in the current situation and must do something, I believe that the best treatment for him is botulinum toxin injection, and he agrees after making his informed decision      No new urologic complaints today  The following portions of the patient's history were reviewed and updated as appropriate: allergies, current medications, past family history, past medical history, past social history, past surgical history and problem list     Detailed Urologic History     - please refer to HPI    Review of Systems     Review of Systems   Constitutional: Negative  HENT: Negative  Eyes: Negative  Respiratory: Negative  Cardiovascular: Negative  Gastrointestinal: Negative  Endocrine: Negative  Genitourinary: Positive for frequency and urgency  Musculoskeletal: Negative  Skin: Negative  Allergic/Immunologic: Negative  Neurological: Negative  Hematological: Negative  Psychiatric/Behavioral: The patient is nervous/anxious  Allergies     No Known Allergies    Physical Exam     Physical Exam   Constitutional: He is oriented to person, place, and time  He appears well-developed and well-nourished  No distress  HENT:   Head: Normocephalic and atraumatic  Eyes: Right eye exhibits no discharge  Left eye exhibits no discharge  Neck: No tracheal deviation present  Cardiovascular: Intact distal pulses  Pulmonary/Chest: Effort normal  No stridor  No respiratory distress  Abdominal: Soft  He exhibits no distension and no mass  There is no tenderness  There is no rebound and no guarding  No hernia  Genitourinary:   Genitourinary Comments: Uncircumcised phallus, normal testes, penis is tender   Musculoskeletal: He exhibits no edema, tenderness or deformity  Neurological: He is alert and oriented to person, place, and time  No cranial nerve deficit  Coordination normal    Skin: Skin is warm and dry  No rash noted  He is not diaphoretic  No erythema  No pallor  Psychiatric:   The patient has a nervous affect, he perseverates as well   Nursing note and vitals reviewed            Vital Signs  Vitals:    12/20/19 1339   BP: 140/90   BP Location: Left arm Patient Position: Sitting   Cuff Size: Standard   Pulse: 97   Weight: 111 kg (244 lb)   Height: 5' 10" (1 778 m)         Current Medications       Current Outpatient Medications:     amLODIPine (NORVASC) 5 mg tablet, Take 1 tablet (5 mg total) by mouth every 12 (twelve) hours, Disp: 180 tablet, Rfl: 1    aspirin (ECOTRIN LOW STRENGTH) 81 mg EC tablet, Take 81 mg by mouth daily Resume on 8/14, Disp: , Rfl:     atorvastatin (LIPITOR) 80 mg tablet, Take 1 tablet (80 mg total) by mouth daily with dinner, Disp: 90 tablet, Rfl: 1    Blood Glucose Monitoring Suppl (ONE TOUCH ULTRA MINI) w/Device KIT, by Does not apply route 3 (three) times a day, Disp: 1 each, Rfl: 0    Blood Pressure Monitoring (BLOOD PRESSURE CUFF) MISC, Use to check blood pressure before taking blood pressure medication and 1 hour after and follow instructions provided in discharge instructions based on the readings  , Disp: 1 each, Rfl: 0    carvedilol (COREG) 6 25 mg tablet, Take 1 tablet (6 25 mg total) by mouth 2 (two) times a day with meals, Disp: 180 tablet, Rfl: 1    Cholecalciferol (VITAMIN D3) 74127 units CAPS, Take 1 capsule (50,000 Units total) by mouth once a week, Disp: 5 capsule, Rfl: 5    Continuous Blood Gluc  (DEXCOM G6 ) [facility], Use as directed for continuous glucose monitoring, Disp: 1 Device, Rfl: 0    Continuous Blood Gluc Sensor (DEXCOM G6 SENSOR) MISC, Use as directed for continuous glucose monitoring   Change every 10 days, Disp: 1 each, Rfl: 11    Continuous Blood Gluc Transmit (DEXCOM G6 TRANSMITTER) MISC, Use as directed for continuous glucose monitoring-Change every 3 months, Disp: 1 each, Rfl: 3    [employer] VITAMIN B-12 1000 MCG tablet, TAKE 1 TABLET BY MOUTH EVERY DAY, Disp: 90 tablet, Rfl: 1    diphenoxylate-atropine (LOMOTIL) 2 5-0 025 mg per tablet, Take 1 tablet by mouth 4 (four) times a day as needed for diarrhea, Disp: , Rfl:     escitalopram (LEXAPRO) 10 mg tablet, Take 1 tablet (10 mg total) by mouth daily at bedtime, Disp: 90 tablet, Rfl: 0    famotidine (PEPCID) 20 mg tablet, Take 20 mg by mouth daily Resume on 8/14, Disp: , Rfl:     gabapentin (NEURONTIN) 250 mg/5 mL solution, Take 12 mL (600 mg total) by mouth daily at bedtime, Disp: 470 mL, Rfl: 2    glucagon (GLUCAGON EMERGENCY) 1 MG injection, Inject 1 mg under the skin once as needed for low blood sugar for up to 1 dose, Disp: 1 kit, Rfl: 2    glucose 4 g chewable tablet, Chew 3 tablets (12 g total) as needed for low blood sugar, Disp: 50 tablet, Rfl: 0    glucose blood (ONE TOUCH ULTRA TEST) test strip, 1 each by Other route 3 (three) times a day Use as instructed, Disp: 270 each, Rfl: 1    Incontinence Supplies (MALE URINAL) MISC, by Does not apply route daily, Disp: 6 each, Rfl: 3    insulin aspart (NovoLOG) 100 units/mL injection, Inject 4 Units under the skin 3 (three) times a day before meals Plus scale, Disp: , Rfl:     insulin glargine (LANTUS) 100 units/mL subcutaneous injection, Inject 20 Units under the skin daily, Disp: 10 mL, Rfl: 0    Insulin Syringe-Needle U-100 (B-D INS SYR ULTRAFINE  3CC/30G) 30G X 1/2" 0 3 ML MISC, by Does not apply route 4 (four) times a day, Disp: 360 each, Rfl: 1    Insulin Syringe-Needle U-100 (B-D INS SYRINGE 0 5CC/30GX1/2") 30G X 1/2" 0 5 ML MISC, Inject under the skin 4 (four) times a day, Disp: 360 each, Rfl: 1    LUCENTIS 0 3 MG/0 05ML, , Disp: , Rfl:     mupirocin (BACTROBAN) 2 % cream, Apply topically daily, Disp: 30 g, Rfl: 0    ONETOUCH DELICA LANCETS 25S MISC, by Does not apply route 3 (three) times a day, Disp: 270 each, Rfl: 1    SBI/Protein Isolate (ENTERAGAM) 5 g PACK, Take by mouth, Disp: , Rfl:       Active Problems     Patient Active Problem List   Diagnosis    Type 2 diabetes mellitus with hyperglycemia, with long-term current use of insulin (HCC)    Mixed hyperlipidemia    Benign hypertension with CKD (chronic kidney disease) stage IV (HCC)    Persistent proteinuria    Cerebrovascular accident (CVA) due to thrombosis of left middle cerebral artery (HCC)    Cognitive impairment    Elevated alkaline phosphatase level    Stage 4 chronic kidney disease (HCC)    Diabetic macular edema (HCC)    GERD (gastroesophageal reflux disease)    Cirrhosis ([facility] )    Diabetic polyneuropathy associated with type 2 diabetes mellitus (HCC)    Other constipation    Abnormal EEG    History of stroke    TIA (transient ischemic attack)    Stroke-like symptoms, with right-sided weakness    symptomatic hypoglycemia    Cellulitis    Anxiety associated with depression    Hypertension    Hypothermia    Urge incontinence    Overactive bladder         Past Medical History     Past Medical History:   Diagnosis Date    Diabetes mellitus ([place] [address] )     Hypercholesteremia     Hyperlipidemia     Hypertension     Neuropathy     Obesity     Osteomyelitis ([facility] )     last assessed 11/4/16    PVC's (premature ventricular contractions)     sees cardiology Dr Rosey camargo    Stroke [facility])     last weeof July 2018 [employer]         Surgical History     Past Surgical History:   Procedure Laterality Date    ABDOMINAL SURGERY      CHOLECYSTECTOMY      Percutaneous    CYSTOSCOPY      OTHER SURGICAL HISTORY      "stimulator to control bowel movements"    MI ESOPHAGOGASTRODUODENOSCOPY TRANSORAL DIAGNOSTIC N/A 9/27/2016    Procedure: ESOPHAGOGASTRODUODENOSCOPY (EGD); Surgeon: Davin Null MD;  [facility];   Service: Gastroenterology    MI LAP,CHOLECYSTECTOMY N/A 2/29/2016    Procedure: LAPAROSCOPIC CHOLECYSTECTOMY ;  Surgeon: Rukhsana Roblero DO;  [facility];  Service: General    ROTATOR CUFF REPAIR Right     TOE AMPUTATION Right 10/28/2016    Procedure: 3RD TOE AMPUTATION ;  Surgeon: Bishop Roxanne DPM;  [facility];  Service:          Family History     Family History   Problem Relation Age of Onset    Leukemia Mother     Liver disease Mother  Lung cancer Mother         heavy smoker - 3 ppd    Heart disease Father     Liver disease Father     Multiple myeloma Sister     Breast cancer Sister     Urolithiasis Family     Alcohol abuse Neg Hx     Depression Neg Hx     Drug abuse Neg Hx     Substance Abuse Neg Hx     Mental illness Neg Hx          Social History     Social History     Social History     Tobacco Use   Smoking Status Never Smoker   Smokeless Tobacco Never Used         Pertinent Lab Values     Lab Results   Component Value Date    CREATININE 3 30 (H) 10/12/2019       No results found for: PSA          Pertinent Imaging      Extensive review of CMG is performed today

## 2019-12-21 LAB — BACTERIA UR CULT: NORMAL

## 2019-12-24 ENCOUNTER — TELEPHONE (OUTPATIENT)
Dept: NEUROLOGY | Facility: CLINIC | Age: 59
End: 2019-12-24

## 2019-12-24 NOTE — TELEPHONE ENCOUNTER
Patient's wife asking for earlier appt with Angel Butler on 12/26, asking for early morning  No appointments available  She accepted appt on 1/8/20 at 3:30pm for the patient  Cancelled 12/26 appt

## 2019-12-31 ENCOUNTER — APPOINTMENT (OUTPATIENT)
Dept: LAB | Facility: CLINIC | Age: 59
End: 2019-12-31
Payer: COMMERCIAL

## 2019-12-31 ENCOUNTER — TRANSCRIBE ORDERS (OUTPATIENT)
Dept: LAB | Facility: CLINIC | Age: 59
End: 2019-12-31

## 2019-12-31 ENCOUNTER — OFFICE VISIT (OUTPATIENT)
Dept: DIABETES SERVICES | Facility: CLINIC | Age: 59
End: 2019-12-31
Payer: COMMERCIAL

## 2019-12-31 VITALS — WEIGHT: 241.2 LBS | BODY MASS INDEX: 34.61 KG/M2

## 2019-12-31 DIAGNOSIS — R80.8 OTHER PROTEINURIA: ICD-10-CM

## 2019-12-31 DIAGNOSIS — N18.4 CHRONIC KIDNEY DISEASE, STAGE IV (SEVERE) (HCC): Primary | ICD-10-CM

## 2019-12-31 DIAGNOSIS — I10 ESSENTIAL HYPERTENSION, MALIGNANT: ICD-10-CM

## 2019-12-31 DIAGNOSIS — E11.9 DIABETES MELLITUS WITHOUT COMPLICATION (HCC): ICD-10-CM

## 2019-12-31 DIAGNOSIS — Z79.4 TYPE 2 DIABETES MELLITUS WITH HYPERGLYCEMIA, WITH LONG-TERM CURRENT USE OF INSULIN (HCC): Primary | ICD-10-CM

## 2019-12-31 DIAGNOSIS — D63.1 ANEMIA OF CHRONIC RENAL FAILURE, UNSPECIFIED CKD STAGE: ICD-10-CM

## 2019-12-31 DIAGNOSIS — E55.9 AVITAMINOSIS D: ICD-10-CM

## 2019-12-31 DIAGNOSIS — N18.4 CHRONIC KIDNEY DISEASE, STAGE IV (SEVERE) (HCC): ICD-10-CM

## 2019-12-31 DIAGNOSIS — N18.9 ANEMIA OF CHRONIC RENAL FAILURE, UNSPECIFIED CKD STAGE: ICD-10-CM

## 2019-12-31 DIAGNOSIS — K74.60 HEPATIC CIRRHOSIS, UNSPECIFIED HEPATIC CIRRHOSIS TYPE, UNSPECIFIED WHETHER ASCITES PRESENT (HCC): ICD-10-CM

## 2019-12-31 DIAGNOSIS — E11.65 TYPE 2 DIABETES MELLITUS WITH HYPERGLYCEMIA, WITH LONG-TERM CURRENT USE OF INSULIN (HCC): Primary | ICD-10-CM

## 2019-12-31 LAB
ALBUMIN SERPL BCP-MCNC: 3.1 G/DL (ref 3.5–5)
ALP SERPL-CCNC: 132 U/L (ref 46–116)
ALT SERPL W P-5'-P-CCNC: 20 U/L (ref 12–78)
ANION GAP SERPL CALCULATED.3IONS-SCNC: 11 MMOL/L (ref 4–13)
AST SERPL W P-5'-P-CCNC: 8 U/L (ref 5–45)
BILIRUB SERPL-MCNC: 0.38 MG/DL (ref 0.2–1)
BUN SERPL-MCNC: 64 MG/DL (ref 5–25)
CALCIUM SERPL-MCNC: 8.6 MG/DL (ref 8.3–10.1)
CHLORIDE SERPL-SCNC: 107 MMOL/L (ref 100–108)
CO2 SERPL-SCNC: 22 MMOL/L (ref 21–32)
CREAT SERPL-MCNC: 4.8 MG/DL (ref 0.6–1.3)
GFR SERPL CREATININE-BSD FRML MDRD: 12 ML/MIN/1.73SQ M
GLUCOSE P FAST SERPL-MCNC: 176 MG/DL (ref 65–99)
POTASSIUM SERPL-SCNC: 4.8 MMOL/L (ref 3.5–5.3)
PROT SERPL-MCNC: 7.2 G/DL (ref 6.4–8.2)
SODIUM SERPL-SCNC: 140 MMOL/L (ref 136–145)
VIT B12 SERPL-MCNC: 1022 PG/ML (ref 100–900)

## 2019-12-31 PROCEDURE — 97803 MED NUTRITION INDIV SUBSEQ: CPT | Performed by: DIETITIAN, REGISTERED

## 2019-12-31 PROCEDURE — 80053 COMPREHEN METABOLIC PANEL: CPT

## 2019-12-31 PROCEDURE — 82607 VITAMIN B-12: CPT | Performed by: PSYCHIATRY & NEUROLOGY

## 2019-12-31 PROCEDURE — 36415 COLL VENOUS BLD VENIPUNCTURE: CPT | Performed by: PSYCHIATRY & NEUROLOGY

## 2019-12-31 NOTE — PROGRESS NOTES
Medical Nutrition Therapy      Assessment    Chief complaint T2DM    Visit Type: Follow-up visit    HPI: Dima Pisano returned for follow-up today  No new HbA1c since initial MNT, 7 3%  [place] food record reveals inconsistent carbohydrate intake, excess sodium and saturated fat intake from processed foods, and inadequate intake of fruit, vegetables, whole-grains, and low-fat dairy  Overall, [place] meals provide between 30 - 180 grams carbohydrate per meal  His usual lunch provides 30 g CHO, lunch example 54 g CHO, and dinner example 135 - 180 g CHO  Based on meal plan review and diet recall, provided education about the importance of consistent carbohydrate intake of 45- 60 grams of carbohydrate per meal, increasing fruit and vegetable intake, the role of fiber, and decreasing intake of processed foods  Dima Pisano states that he usually eats fast food 1-2 times per day from [employer] or [facility]  He also states that the only non-starchy vegetables that he likes are lettuce, tomatoes, cucumbers, and eggplant  Discussed incorporating salad and eggplant into his lunch and dinner more frequently and what portions of brown rice and baked potato equal 45 - 60 grams of carbohydrate  Recommended that Dima Pisano add a serving of fruit to his usual breakfast and have a serving of fruit or 3 cups of popcorn as snacks between meals  Emphasis was placed on meeting carbohydrate needs at breakfast and lunch as well as making sure to have a snack between breakfast and lunch and between lunch and dinner to help with portion control at dinner  Asad's wife expressed frustration today that he is not following his meal plan and feels that she cannot cook at home due to Dima Pisano consuming portions that exceed carbohydrate recommendations   Discussed strategies to help with portion control with Dima Pisano and his wife including plate method and not preparing more than needed of starch choices for dinner meal  Dima Pisano states that he does not want to exercise  Discussed role of exercise in managing diabetes and different options that may appeal to him such as riding a stationary bike at home  Manuela Matt expressed interest in this idea and his wife plans to look into affordable stationary bike options  Manuela Matt demonstrated good understanding and will call with any questions prior to next follow-up appointment in 3 months  Ht Readings from Last 1 Encounters:   12/20/19 5' 10" (1 778 m)     Wt Readings from Last 2 Encounters:   12/20/19 111 kg (244 lb)   11/26/19 109 kg (241 lb 3 2 oz)     Weight Change: No    Medical Diagnosis/reason for visit E11 65, Z79 4 (ICD-10-CM) - Type 2 diabetes mellitus with hyperglycemia, with long-term current use of insulin     Food Log: Completed via the method of food recall             Please see scanned 3 day food diary    Breakfast:wakes 5 am  Eats right away  2 eggs, toast (2 white) and coffee (3 cups, splenda and cream)  Morning Snack:10 am [employer], sausage mcmuffin (2) and a coffee (splenda and milk)  Lunch:1 pm  Chicken sandwich (breaded) from [employer]  Water  Afternoon Snack: n/a  Dinner:6 pm spaghetti 3 -4 cups with jar sauce (a lot of sauce) with chicken  Water  Evening Snack:n/a    Beverages: water  coffee  Eating out/Take out:1-2 times per day  Says sometimes he won't eat out at all fo 2 weeks at a time  Exercise none       Calorie needs 1800 kcals/day Carbs: 45 - 60 g/meal, 15 g/snack     Fat: 50 g/day    Protein:87 g/day    Nutrition Diagnosis:  Inconsistent carbohydrate intake  intake related to Food and nutrition related knowledge deficit concerning appropriate amount and timing of carbohydrate intake as evidenced by  Estimated carbohydrate intake that is different from recommended types or ingested on an irregular basis    Intervention: plate method, reduced fat intake, increased fiber intake, carbohydrate counting, increased plant based foods, meal planning, exercise guidelines and food diary Treatment Goals: Patient understands education and recommendations, Patient will monitor food intake daily with tracker, Patient will monitor portion control, Patient will increase their intake of plant based foods, Patient will count carbohydrates and Patient will exercise    Monitoring and evaluation:    Term code indicator  FH 1 6 3 Carbohydrate Intake Criteria: 45 - 60 grams of carbohydrate per meal, 15 grams of carbohydrate per snack  Term code indicator  FH 4 4 Mealtime Behavior Criteria: 3 meals per day, 4 - 5 hours apart  3 snacks per day, at least 2 hours apart from meals  Patients Response to Instruction:  Alicia Ureña  Expected Compliancefair    Thank you for coming to the [facility] for education today  Please feel free to call with any questions or concerns  Start- Stop: 10:45 am - 11:30 am   Total Minutes: 45 Minutes  Group or Individual Instruction: MNT - I - FU  Other: ALLISON Spears [place] [id number]

## 2019-12-31 NOTE — PATIENT INSTRUCTIONS
Add a serving of fruit to your normal breakfast of eggs and 2 slices of toast   Snacks between meals could be a serving of fruit of 3 cups of popcorn  At lunch and dinner try to have half of your plate be non-starchy vegetables such as salad or eggplant, a lean protein such as chicken or fish, and 1 cup of brown rice or a large baked potato  Begin to explore options for exercise such as a stationary bike

## 2020-01-03 ENCOUNTER — TELEPHONE (OUTPATIENT)
Dept: UROLOGY | Facility: MEDICAL CENTER | Age: 60
End: 2020-01-03

## 2020-01-03 NOTE — TELEPHONE ENCOUNTER
Patient of Dr Cami Marina seen in [place] office  Patient had cysto on 12/20/19 and son would like to know if any medications or injections were given at time of procedure  Patient's creatine level has gone up and son was asked to investigate if father has had medications or dye for procedure  Please advise

## 2020-01-03 NOTE — TELEPHONE ENCOUNTER
Called and spoke with patient's son at this time  Informed him the only medication used for cystoscopies is lidocaine gel and sterile water  Neither of these should increase patient's creatinine levels  Patient's son reports nephrology was concerned and wanted to double check  No other questions or concerns at this time

## 2020-01-06 ENCOUNTER — TELEPHONE (OUTPATIENT)
Dept: NEUROLOGY | Facility: CLINIC | Age: 60
End: 2020-01-06

## 2020-01-08 ENCOUNTER — OFFICE VISIT (OUTPATIENT)
Dept: NEUROLOGY | Facility: CLINIC | Age: 60
End: 2020-01-08
Payer: COMMERCIAL

## 2020-01-08 VITALS
DIASTOLIC BLOOD PRESSURE: 67 MMHG | HEART RATE: 78 BPM | WEIGHT: 239 LBS | SYSTOLIC BLOOD PRESSURE: 148 MMHG | HEIGHT: 70 IN | BODY MASS INDEX: 34.22 KG/M2

## 2020-01-08 DIAGNOSIS — I63.312 CEREBROVASCULAR ACCIDENT (CVA) DUE TO THROMBOSIS OF LEFT MIDDLE CEREBRAL ARTERY (HCC): Primary | ICD-10-CM

## 2020-01-08 PROCEDURE — 99284 EMERGENCY DEPT VISIT MOD MDM: CPT

## 2020-01-08 PROCEDURE — 99284 EMERGENCY DEPT VISIT MOD MDM: CPT | Performed by: EMERGENCY MEDICINE

## 2020-01-08 PROCEDURE — 99215 OFFICE O/P EST HI 40 MIN: CPT | Performed by: PHYSICIAN ASSISTANT

## 2020-01-08 NOTE — PROGRESS NOTES
Patient ID: Marybeth Gold is a 61 y o  male  Assessment/Plan:    Cerebrovascular accident (CVA) due to thrombosis of left middle cerebral artery [facility])  Patient presents with his wife as requested by his PCP due to some recent cognitive changes  Per the wife the only change has been in regards to forgetting his daughters name on a few occasions over the past few months  His wife noticed an overall decline in his cognition following the stroke in 2018 however she feels that he is improving with time and is now back to 75%  No other new complaints or changes and she feels that he is overall doing very well  He did have difficulty on formal memory testing in the office today (13/30) however unclear what is actual baseline was prior to the stroke  He is functioning well at home and is able to perform his ADLs at this time  Discussed medication options for dementia however there are no studies that clearly show a medication is helpful for vascular dementia  They are not interested in any medication trial at this time and will continue to monitor for now  He has not been cleared to return to driving at this time  At the last visit with Dr Marianne Machado (3/2019) he was to refrain from driving and possibly repeat the FTD test in a few months  His initial testing results had recommended on the road testing through [facility] due to difficulty from a cognitive perspective  At this time will consider repeat testing at his next follow up if he continues to improve from a cognitive standpoint  He was encouraged to keep his mind more active with things such as reading, crosswords, puzzles and Solitaire  From a stroke perspective he denies any new TIA or stroke like symptoms  He will remain on ASA daily along with medication for his BP, cholesterol and DM  He follows with his PCP and Endocrinologist   I do not see any recent lipid panel and will order this now   He was to try and monitor his pressure at home with a goal of 130/80 or below  If he has any symptoms concerning for recurrent TIA or stroke such as sudden painless loss of vision or double vision, new difficulty speaking or swallowing, vertigo/room spinning that does not quickly resolve, or new weakness/numbness affecting 1 side of the body he should be seen at the nearest emergency room immediately  Subjective: Anupam Macias is a 55-year-old male with diabetes mellitus, hypertension, hyperlipidemia, and history of left lacunar ischemic stroke sustained in 2018 who presents today for follow-up  At his last visit with Dr Marie Post his family his family voiced some concerns with residual weakness on his right hand side  They also reported that he was having issues with laughing during conversation in an effort to stall for time when he is searching for words  There was potential concerning for pseudobulbar affect  MRI revealed some new signal in the left anterior mid brain compared with the prior MRI in August of 2018  It was felt this 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 rather than any kind of new event  Prior EEGs were not significantly suggestive in terms of epileptiform activity  He was to continue on aspirin, atorvastatin, and appropriate blood pressure and glycemic control for secondary stroke prevention  He was to have a repeat carotid doppler for his minimal atherosclerosis in the carotid arteries on both sides  He was encouraged to get more active  He was to switch to Lexapro (instead of escitalopram) and start Melatonin for sleep  He was also sent for speech therapy  He voiced some interest in returning to drive and he was sent for a drivers evaluation (2/42/24)  This was reviewed with him by Dr Liane Story 3/7 and per the note "scored a 73% on DCAT - high probability of failing on the road test, largely due to cognitive deficits    I have strongly advised patient NOT to proceed with [place] testing at this time, but to continue outpatient therapies and CVA recovery  Patient likely requires more time for neuro-recovery and driving is not safe at this time  Repeat fitness to drive can be entertained at our next visit in 4 months "     He was recently seen (11/26) by his PCP with concerns for worsening memory and personality changes and he was sent for a repeat MRI  This was stable compared to prior imaging with no acute changes  He was told to follow up with our office regarding the concerns  B12 elevated, he had replacement in the past due to prior deficiency  He presents today with his wife  She has been noticing some changes with his cognition over the past few months  He has had a few episodes when he forgot his daughter's name  He tends to always forget the same daughter"s name and will call her by his sister at times  She feels that since the stroke there has been a clear change in him cognitively  She is not able to rely on him for everything as she did in the past   He is however improving everyday and she feels that he is about 75% better than what he was right after the stroke  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, he had been driving truck  He is functioning well on his own  He is able to stay at home alone when his wife goes to work  He is able to get his own lunch  He watches TV and plays cards  He dresses and showers without issues  He feels that he is sleeping well  His son helps to manage his medications however he is able to take it all on his own  His son will oversee this and make sure that he is taking everything correctly  He stopped managing the finances after the stroke however he is still knowledgeable about the fiances that they have  He is not driving and this makes him very sad  He would like to return to driving  His son is getting  soon and would like to travel to [facility] for it  Total time spent today 36 minutes   Greater than 50% of total time was spent with the patient and / or family counseling and / or coordination of care    I personally reviewed and updated the ROS  Objective:    Blood pressure 148/67, pulse 78, height 5' 10" (1 778 m), weight 108 kg (239 lb)  Physical Exam   Constitutional: He appears well-developed and well-nourished  Eyes: Pupils are equal, round, and reactive to light  Lids are normal    Pulmonary/Chest: Effort normal    Neurological: He is alert  Psychiatric: His speech is normal        Neurological Exam  Mental Status  Awake and alert  Oriented only to person, place and time  Unable to copy figure  Clock drawing is abnormal  Speech is normal  Able to perform serial calculations  MoCA 13/30 - 1/8/20   Cranial Nerves  CN III, IV, VI: Extraocular movements intact bilaterally  Normal lids and orbits bilaterally  Pupils equal round and reactive to light bilaterally  CN V: Facial sensation is normal   CN VII:  Right: There is no facial weakness  Left: There is no facial weakness  CN VIII: Hearing is normal   CN IX, X: Palate elevates symmetrically  Normal gag reflex  CN XI: Shoulder shrug strength is normal   CN XII: Tongue midline without atrophy or fasciculations  Motor    Decreased strength in the RUE and RLE compared to the left       Sensory  Light touch is normal in upper and lower extremities  ROS:    Review of Systems   Constitutional: Negative  Negative for appetite change and fever  HENT: Negative  Negative for hearing loss, tinnitus, trouble swallowing and voice change  Eyes: Negative  Negative for photophobia and pain  Respiratory: Negative  Negative for shortness of breath  Cardiovascular: Negative  Negative for palpitations  Gastrointestinal: Negative  Negative for nausea and vomiting  Endocrine: Negative  Negative for cold intolerance and heat intolerance  Genitourinary: Negative  Negative for dysuria, frequency and urgency  Musculoskeletal: Negative    Negative for myalgias and neck pain  Skin: Negative  Negative for rash  Neurological: Negative  Negative for dizziness, tremors, seizures, syncope, facial asymmetry, speech difficulty, weakness, light-headedness, numbness and headaches  Hematological: Negative  Does not bruise/bleed easily  Psychiatric/Behavioral: Negative  Negative for confusion, hallucinations and sleep disturbance

## 2020-01-08 NOTE — PATIENT INSTRUCTIONS
Patient presents with his wife as requested by his PCP due to some recent cognitive changes  Per the wife the only change has been in regards to forgetting his daughters name on a few occasions over the past few months  His wife noticed an overall decline in his cognition following the stroke in 2018 however she feels that he is improving with time and is now back to 75%  No other new complaints or changes and she feels that he is overall doing very well  He did have difficulty on formal memory testing in the office today (13/30) however unclear what is actual baseline was prior to the stroke  He is functioning well at home and is able to perform his ADLs at this time  Discussed medication options for dementia however there are no studies that clearly show a medication is helpful for vascular dementia  They are not interested in any medication trial at this time and will continue to monitor for now  He has not been cleared to return to driving at this time  At the last visit with Dr Angelo White (3/2019) he was to refrain from driving and possibly repeat the FTD test in a few months  His initial testing results had recommended on the road testing through [facility] due to difficulty from a cognitive perspective  At this time will consider repeat testing at his next follow up if he continues to improve from a cognitive standpoint  He was encouraged to keep his mind more active with things such as reading, crosswords, puzzles and Solitaire  From a stroke perspective he denies any new TIA or stroke like symptoms  He will remain on ASA daily along with medication for his BP, cholesterol and DM  He follows with his PCP and Endocrinologist   I do not see any recent lipid panel and will order this now  He was to try and monitor his pressure at home with a goal of 130/80 or below    If he has any symptoms concerning for recurrent TIA or stroke such as sudden painless loss of vision or double vision, new difficulty speaking or swallowing, vertigo/room spinning that does not quickly resolve, or new weakness/numbness affecting 1 side of the body he should be seen at the nearest emergency room immediately

## 2020-01-09 ENCOUNTER — HOSPITAL ENCOUNTER (EMERGENCY)
Facility: HOSPITAL | Age: 60
Discharge: HOME | End: 2020-01-09
Attending: EMERGENCY MEDICINE
Payer: COMMERCIAL

## 2020-01-09 ENCOUNTER — APPOINTMENT (EMERGENCY)
Dept: CT IMAGING | Facility: HOSPITAL | Age: 60
End: 2020-01-09
Payer: COMMERCIAL

## 2020-01-09 VITALS
SYSTOLIC BLOOD PRESSURE: 183 MMHG | BODY MASS INDEX: 34.84 KG/M2 | TEMPERATURE: 98.9 F | HEART RATE: 80 BPM | DIASTOLIC BLOOD PRESSURE: 92 MMHG | WEIGHT: 242.8 LBS | RESPIRATION RATE: 18 BRPM | OXYGEN SATURATION: 95 %

## 2020-01-09 DIAGNOSIS — R10.9 ABDOMINAL PAIN: Primary | ICD-10-CM

## 2020-01-09 PROBLEM — F01.50 VASCULAR DEMENTIA (HCC): Status: ACTIVE | Noted: 2020-01-09

## 2020-01-09 LAB
ALBUMIN SERPL BCP-MCNC: 3.2 G/DL (ref 3.5–5)
ALP SERPL-CCNC: 173 U/L (ref 46–116)
ALT SERPL W P-5'-P-CCNC: 21 U/L (ref 12–78)
ANION GAP SERPL CALCULATED.3IONS-SCNC: 10 MMOL/L (ref 4–13)
AST SERPL W P-5'-P-CCNC: 12 U/L (ref 5–45)
BACTERIA UR QL AUTO: ABNORMAL /HPF
BASOPHILS # BLD AUTO: 0.04 THOUSANDS/ΜL (ref 0–0.1)
BASOPHILS NFR BLD AUTO: 1 % (ref 0–1)
BILIRUB SERPL-MCNC: 0.38 MG/DL (ref 0.2–1)
BILIRUB UR QL STRIP: NEGATIVE
BUN SERPL-MCNC: 54 MG/DL (ref 5–25)
CALCIUM SERPL-MCNC: 9.3 MG/DL (ref 8.3–10.1)
CHLORIDE SERPL-SCNC: 102 MMOL/L (ref 100–108)
CLARITY UR: CLEAR
CO2 SERPL-SCNC: 24 MMOL/L (ref 21–32)
COLOR UR: ABNORMAL
CREAT SERPL-MCNC: 4.7 MG/DL (ref 0.6–1.3)
EOSINOPHIL # BLD AUTO: 0.15 THOUSAND/ΜL (ref 0–0.61)
EOSINOPHIL NFR BLD AUTO: 2 % (ref 0–6)
ERYTHROCYTE [DISTWIDTH] IN BLOOD BY AUTOMATED COUNT: 13.2 % (ref 11.6–15.1)
GFR SERPL CREATININE-BSD FRML MDRD: 13 ML/MIN/1.73SQ M
GLUCOSE SERPL-MCNC: 234 MG/DL (ref 65–140)
GLUCOSE UR STRIP-MCNC: ABNORMAL MG/DL
HCT VFR BLD AUTO: 38.2 % (ref 36.5–49.3)
HGB BLD-MCNC: 12.5 G/DL (ref 12–17)
HGB UR QL STRIP.AUTO: ABNORMAL
HOLD SPECIMEN: NORMAL
IMM GRANULOCYTES # BLD AUTO: 0.06 THOUSAND/UL (ref 0–0.2)
IMM GRANULOCYTES NFR BLD AUTO: 1 % (ref 0–2)
KETONES UR STRIP-MCNC: NEGATIVE MG/DL
LEUKOCYTE ESTERASE UR QL STRIP: NEGATIVE
LIPASE SERPL-CCNC: 142 U/L (ref 73–393)
LYMPHOCYTES # BLD AUTO: 2.02 THOUSANDS/ΜL (ref 0.6–4.47)
LYMPHOCYTES NFR BLD AUTO: 23 % (ref 14–44)
MCH RBC QN AUTO: 29.2 PG (ref 26.8–34.3)
MCHC RBC AUTO-ENTMCNC: 32.7 G/DL (ref 31.4–37.4)
MCV RBC AUTO: 89 FL (ref 82–98)
MONOCYTES # BLD AUTO: 0.66 THOUSAND/ΜL (ref 0.17–1.22)
MONOCYTES NFR BLD AUTO: 8 % (ref 4–12)
NEUTROPHILS # BLD AUTO: 5.75 THOUSANDS/ΜL (ref 1.85–7.62)
NEUTS SEG NFR BLD AUTO: 65 % (ref 43–75)
NITRITE UR QL STRIP: NEGATIVE
NON-SQ EPI CELLS URNS QL MICRO: ABNORMAL /HPF
NRBC BLD AUTO-RTO: 0 /100 WBCS
PH UR STRIP.AUTO: 6 [PH]
PLATELET # BLD AUTO: 172 THOUSANDS/UL (ref 149–390)
PMV BLD AUTO: 10.4 FL (ref 8.9–12.7)
POTASSIUM SERPL-SCNC: 4.5 MMOL/L (ref 3.5–5.3)
PROT SERPL-MCNC: 7.1 G/DL (ref 6.4–8.2)
PROT UR STRIP-MCNC: >=300 MG/DL
RBC # BLD AUTO: 4.28 MILLION/UL (ref 3.88–5.62)
RBC #/AREA URNS AUTO: ABNORMAL /HPF
SODIUM SERPL-SCNC: 136 MMOL/L (ref 136–145)
SP GR UR STRIP.AUTO: 1.02 (ref 1–1.03)
UROBILINOGEN UR QL STRIP.AUTO: 0.2 E.U./DL
WBC # BLD AUTO: 8.68 THOUSAND/UL (ref 4.31–10.16)
WBC #/AREA URNS AUTO: ABNORMAL /HPF

## 2020-01-09 PROCEDURE — 81001 URINALYSIS AUTO W/SCOPE: CPT | Performed by: EMERGENCY MEDICINE

## 2020-01-09 PROCEDURE — 36415 COLL VENOUS BLD VENIPUNCTURE: CPT

## 2020-01-09 PROCEDURE — 85025 COMPLETE CBC W/AUTO DIFF WBC: CPT | Performed by: EMERGENCY MEDICINE

## 2020-01-09 PROCEDURE — 80053 COMPREHEN METABOLIC PANEL: CPT | Performed by: EMERGENCY MEDICINE

## 2020-01-09 PROCEDURE — 74176 CT ABD & PELVIS W/O CONTRAST: CPT

## 2020-01-09 PROCEDURE — 83690 ASSAY OF LIPASE: CPT | Performed by: EMERGENCY MEDICINE

## 2020-01-09 RX ORDER — SUCRALFATE ORAL 1 G/10ML
1000 SUSPENSION ORAL ONCE
Status: COMPLETED | OUTPATIENT
Start: 2020-01-09 | End: 2020-01-09

## 2020-01-09 RX ADMIN — SUCRALFATE 1000 MG: 1 SUSPENSION ORAL at 03:09

## 2020-01-09 NOTE — ED PROVIDER NOTES
History  Chief Complaint   Patient presents with    Abdominal Pain     per pt "he has been having some left sided abdominal pain which radites into his left leg, pt denies any N/V but confirms diarrhea more that 2x which started today "     HPI     Patient is a 61year old male with sharp left and mid abdominal pain that started today  No radiation of the pain  No exac or reliving factors  No pulsatile abdominal mass to suggest AAA  No Point RLQ tenderness to suggest appy  No pain out of proportion to suggest ischemic colitis  No reported vomiting or diarrhea  No reported dysuria or hematuria to suggest pyelo  No reported chest pain, pleuritic chest pain or shortness of breath  No hematemesis/melena/hematochezia reported  Able to tolerate PO  Still passing gas/stools  No reported pulmonary symptoms  No tachypnea  No suprapubic or CVA tenderness  No fever/ruq pain/jaundice  MDM mid abdominal pain, + abdominal distension, will ct scan, broad differential              Prior to Admission Medications   Prescriptions Last Dose Informant Patient Reported? Taking? Blood Glucose Monitoring Suppl (ONE TOUCH ULTRA MINI) w/Device KIT  Spouse/Significant Other No No   Sig: by Does not apply route 3 (three) times a day   Blood Pressure Monitoring (BLOOD PRESSURE CUFF) MISC  Spouse/Significant Other No No   Sig: Use to check blood pressure before taking blood pressure medication and 1 hour after and follow instructions provided in discharge instructions based on the readings     [employer] VITAMIN B-12 1000 MCG tablet  Spouse/Significant Other No No   Sig: TAKE 1 TABLET BY MOUTH EVERY DAY   Cholecalciferol (VITAMIN D3) 99625 units CAPS  Spouse/Significant Other No No   Sig: Take 1 capsule (50,000 Units total) by mouth once a week   Continuous Blood Gluc  (DEXCOM G6 ) LANCE  Spouse/Significant Other No No   Sig: Use as directed for continuous glucose monitoring   Continuous Blood Gluc Sensor (DEXCOM G6 SENSOR) MISC  Spouse/Significant Other No No   Sig: Use as directed for continuous glucose monitoring   Change every 10 days   Continuous Blood Gluc Transmit (DEXCOM G6 TRANSMITTER) MISC  Spouse/Significant Other No No   Sig: Use as directed for continuous glucose monitoring-Change every 3 months   Incontinence Supplies (MALE URINAL) MISC  Spouse/Significant Other No No   Sig: by Does not apply route daily   Insulin Syringe-Needle U-100 (B-D INS SYR ULTRAFINE  3CC/30G) 30G X 1/2" 0 3 ML MISC  Spouse/Significant Other No No   Sig: by Does not apply route 4 (four) times a day   Insulin Syringe-Needle U-100 (B-D INS SYRINGE 0 5CC/30GX1/2") 30G X 1/2" 0 5 ML MISC  Spouse/Significant Other No No   Sig: Inject under the skin 4 (four) times a day   LUCENTIS 0 3 MG/0 05ML  Spouse/Significant Other Yes No   ONETOUCH DELICA LANCETS 13G MISC  Spouse/Significant Other No No   Sig: by Does not apply route 3 (three) times a day   SBI/Protein Isolate (ENTERAGAM) 5 g PACK  Spouse/Significant Other Yes No   Sig: Take by mouth   amLODIPine (NORVASC) 5 mg tablet  Spouse/Significant Other No No   Sig: Take 1 tablet (5 mg total) by mouth every 12 (twelve) hours   aspirin (ECOTRIN LOW STRENGTH) 81 mg EC tablet  Spouse/Significant Other Yes No   Sig: Take 81 mg by mouth daily Resume on 8/14   atorvastatin (LIPITOR) 80 mg tablet  Spouse/Significant Other No No   Sig: Take 1 tablet (80 mg total) by mouth daily with dinner   carvedilol (COREG) 6 25 mg tablet  Spouse/Significant Other No No   Sig: Take 1 tablet (6 25 mg total) by mouth 2 (two) times a day with meals   diphenoxylate-atropine (LOMOTIL) 2 5-0 025 mg per tablet  Spouse/Significant Other Yes No   Sig: Take 1 tablet by mouth 4 (four) times a day as needed for diarrhea   escitalopram (LEXAPRO) 10 mg tablet  Spouse/Significant Other No No   Sig: Take 1 tablet (10 mg total) by mouth daily at bedtime   famotidine (PEPCID) 20 mg tablet  Spouse/Significant Other Yes No Sig: Take 20 mg by mouth daily Resume on    gabapentin (NEURONTIN) 250 mg/5 mL solution  Spouse/Significant Other No No   Sig: Take 12 mL (600 mg total) by mouth daily at bedtime   glucagon (GLUCAGON EMERGENCY) 1 MG injection  Spouse/Significant Other No No   Sig: Inject 1 mg under the skin once as needed for low blood sugar for up to 1 dose   glucose 4 g chewable tablet  Spouse/Significant Other No No   Sig: Chew 3 tablets (12 g total) as needed for low blood sugar   glucose blood (ONE TOUCH ULTRA TEST) test strip  Spouse/Significant Other No No   Si each by Other route 3 (three) times a day Use as instructed   insulin aspart (NovoLOG) 100 units/mL injection  Spouse/Significant Other No No   Sig: Inject 4 Units under the skin 3 (three) times a day before meals Plus scale   Patient not taking: Reported on 2020   insulin glargine (LANTUS) 100 units/mL subcutaneous injection  Spouse/Significant Other No No   Sig: Inject 20 Units under the skin daily   mupirocin (BACTROBAN) 2 % cream  Spouse/Significant Other No No   Sig: Apply topically daily   Patient not taking: Reported on 2020      Facility-Administered Medications: None       Past Medical History:   Diagnosis Date    Diabetes mellitus ([facility] )     Hypercholesteremia     Hyperlipidemia     Hypertension     Neuropathy     Obesity     Osteomyelitis ([facility] )     last assessed 16    PVC's (premature ventricular contractions)     sees cardiology Dr Sumi camargo    Stroke [facility])     last weeof 2018 [address] [place] [address]       Past Surgical History:   Procedure Laterality Date    ABDOMINAL SURGERY      CHOLECYSTECTOMY      Percutaneous    CYSTOSCOPY      OTHER SURGICAL HISTORY      "stimulator to control bowel movements"    NH ESOPHAGOGASTRODUODENOSCOPY TRANSORAL DIAGNOSTIC N/A 2016    Procedure: ESOPHAGOGASTRODUODENOSCOPY (EGD); Surgeon: Thalia Kenyon MD;  [facility];   Service: Gastroenterology    NH LAP,CHOLECYSTECTOMY N/A 2/29/2016    Procedure: LAPAROSCOPIC CHOLECYSTECTOMY ;  Surgeon: Carly Caro DO;  [facility];  Service: General    ROTATOR CUFF REPAIR Right     TOE AMPUTATION Right 10/28/2016    Procedure: 3RD TOE AMPUTATION ;  Surgeon: Kina Acevedo DPM;  [facility];  Service:        Family History   Problem Relation Age of Onset    Leukemia Mother     Liver disease Mother     Lung cancer Mother         heavy smoker - 3 ppd    Heart disease Father     Liver disease Father     Multiple myeloma Sister     Breast cancer Sister     Urolithiasis Family     Alcohol abuse Neg Hx     Depression Neg Hx     Drug abuse Neg Hx     Substance Abuse Neg Hx     Mental illness Neg Hx      I have reviewed and agree with the history as documented  Social History     Tobacco Use    Smoking status: Never Smoker    Smokeless tobacco: Never Used   Substance Use Topics    Alcohol use: No    Drug use: No        Review of Systems   Cardiovascular: Negative for chest pain  Gastrointestinal: Positive for abdominal pain, diarrhea and nausea  Negative for vomiting  All other systems reviewed and are negative  Physical Exam  Physical Exam   Constitutional: He is oriented to person, place, and time  He appears well-developed and well-nourished  HENT:   Head: Normocephalic and atraumatic  Eyes: Pupils are equal, round, and reactive to light  EOM are normal    Neck: Normal range of motion  Neck supple  Cardiovascular: Normal rate, regular rhythm and normal heart sounds  No murmur heard  Pulmonary/Chest: Effort normal and breath sounds normal  No respiratory distress  He has no wheezes  Abdominal: Soft  Bowel sounds are normal  He exhibits distension  There is tenderness  Musculoskeletal: Normal range of motion  He exhibits no edema or tenderness  Neurological: He is alert and oriented to person, place, and time  No cranial nerve deficit   Coordination normal  Skin: Skin is warm and dry  He is not diaphoretic  No erythema  Psychiatric: He has a normal mood and affect  His behavior is normal    Nursing note and vitals reviewed        Vital Signs  ED Triage Vitals [01/08/20 2223]   Temperature Pulse Respirations Blood Pressure SpO2   98 9 °F (37 2 °C) 78 18 134/63 95 %      Temp Source Heart Rate Source Patient Position - Orthostatic VS BP Location FiO2 (%)   Oral Monitor Sitting Left arm --      Pain Score       6           Vitals:    01/08/20 2223 01/09/20 0041   BP: 134/63 (!) 183/92   Pulse: 78 80   Patient Position - Orthostatic VS: Sitting Lying         Visual Acuity      ED Medications  Medications   sucralfate (CARAFATE) oral suspension 1,000 mg (1,000 mg Oral Given 1/9/20 0309)       Diagnostic Studies  Results Reviewed     Procedure Component Value Units Date/Time    Urine Microscopic [[id number]]  (Abnormal) Collected:  01/09/20 0428    Lab Status:  Final result Specimen:  Urine Updated:  01/09/20 0449     RBC, UA 1-2 /hpf      WBC, UA 0-1 /hpf      Epithelial Cells None Seen /hpf      Bacteria, UA None Seen /hpf     UA w Reflex to Microscopic w Reflex to Culture [[id number]]  (Abnormal) Collected:  01/09/20 0428    Lab Status:  Final result Specimen:  Urine Updated:  01/09/20 0448     Color, UA Light Yellow     Clarity, UA Clear     Specific [place], UA 1 020     pH, UA 6 0     Leukocytes, UA Negative     Nitrite, UA Negative     Protein, UA >=300 mg/dl      Glucose,  (1/2%) mg/dl      Ketones, UA Negative mg/dl      Urobilinogen, UA 0 2 E U /dl      Bilirubin, UA Negative     Blood, UA Moderate    Comprehensive metabolic panel [[id number]]  (Abnormal) Collected:  01/09/20 0002    Lab Status:  Final result Specimen:  Blood from Arm, Right Updated:  01/09/20 0025     Sodium 136 mmol/L      Potassium 4 5 mmol/L      Chloride 102 mmol/L      CO2 24 mmol/L      ANION GAP 10 mmol/L      BUN 54 mg/dL      Creatinine 4 70 mg/dL      Glucose 234 mg/dL      Calcium 9 3 mg/dL      AST 12 U/L      ALT 21 U/L      Alkaline Phosphatase 173 U/L      Total Protein 7 1 g/dL      Albumin 3 2 g/dL      Total Bilirubin 0 38 mg/dL      eGFR 13 ml/min/1 73sq m     Narrative:       [place] guidelines for Chronic Kidney Disease (CKD):     Stage 1 with normal or high GFR (GFR > 90 mL/min/1 73 square meters)    Stage 2 Mild CKD (GFR = 60-89 mL/min/1 73 square meters)    Stage 3A Moderate CKD (GFR = 45-59 mL/min/1 73 square meters)    Stage 3B Moderate CKD (GFR = 30-44 mL/min/1 73 square meters)    Stage 4 Severe CKD (GFR = 15-29 mL/min/1 73 square meters)    Stage 5 End Stage CKD (GFR <15 mL/min/1 73 square meters)  Note: GFR calculation is accurate only with a steady state creatinine    Lipase [[id number]]  (Normal) Collected:  01/09/20 0002    Lab Status:  Final result Specimen:  Blood from Arm, Right Updated:  01/09/20 0025     Lipase 142 u/L     CBC and differential [[id number]] Collected:  01/09/20 0002    Lab Status:  Final result Specimen:  Blood from Arm, Right Updated:  01/09/20 0009     WBC 8 68 Thousand/uL      RBC 4 28 Million/uL      Hemoglobin 12 5 g/dL      Hematocrit 38 2 %      MCV 89 fL      MCH 29 2 pg      MCHC 32 7 g/dL      RDW 13 2 %      MPV 10 4 fL      Platelets 906 Thousands/uL      nRBC 0 /100 WBCs      Neutrophils Relative 65 %      Immat GRANS % 1 %      Lymphocytes Relative 23 %      Monocytes Relative 8 %      Eosinophils Relative 2 %      Basophils Relative 1 %      Neutrophils Absolute 5 75 Thousands/µL      Immature Grans Absolute 0 06 Thousand/uL      Lymphocytes Absolute 2 02 Thousands/µL      Monocytes Absolute 0 66 Thousand/µL      Eosinophils Absolute 0 15 Thousand/µL      Basophils Absolute 0 04 Thousands/µL                  CT abdomen pelvis wo contrast   Final Result by Pinky Haywood MD (01/09 0347)      1  No acute findings  2   Small hiatal hernia                 Workstation performed: [id number] Procedures  Procedures         ED Course  ED Course as of [place] 10 1048   Thu Jan 09, 2020   0402 IMPRESSION:     1  No acute findings      2   Small hiatal hernia                                      MDM      Disposition  Final diagnoses:   Abdominal pain     Time reflects when diagnosis was documented in both MDM as applicable and the Disposition within this note     Time User Action Codes Description Comment    1/9/2020  5:17 AM Kyra Monterroso, [address] [R10 9] Abdominal pain       ED Disposition     ED Disposition Condition Date/Time Comment    Discharge Stable Thu Jan 9, 2020  5:17 AM Chantelle Bonilla discharge to home/self care  Follow-up Information     Follow up With Specialties Details Why Vincen[address], DO Internal Medicine In 1 day  [address]  [phone]            Discharge Medication List as of 1/9/2020  5:17 AM      CONTINUE these medications which have NOT CHANGED    Details   amLODIPine (NORVASC) 5 mg tablet Take 1 tablet (5 mg total) by mouth every 12 (twelve) hours, Starting Mon 6/10/2019, Normal      aspirin (ECOTRIN LOW STRENGTH) 81 mg EC tablet Take 81 mg by mouth daily Resume on 8/14, Historical Med      atorvastatin (LIPITOR) 80 mg tablet Take 1 tablet (80 mg total) by mouth daily with dinner, Starting Mon 6/10/2019, Normal      Blood Glucose Monitoring Suppl (ONE TOUCH ULTRA MINI) w/Device KIT by Does not apply route 3 (three) times a day, Starting Tue 11/27/2018, Normal      Blood Pressure Monitoring (BLOOD PRESSURE CUFF) MISC Use to check blood pressure before taking blood pressure medication and 1 hour after and follow instructions provided in discharge instructions based on the readings  , Print      carvedilol (COREG) 6 25 mg tablet Take 1 tablet (6 25 mg total) by mouth 2 (two) times a day with meals, Starting Wed 7/10/2019, Normal      Cholecalciferol (VITAMIN D3) 73427 units CAPS Take 1 capsule (50,000 Units total) by mouth once a week, Starting Fri 10/11/2019, Normal      Continuous Blood Gluc  (DEXCOM G6 ) LANCE Use as directed for continuous glucose monitoring, Normal      Continuous Blood Gluc Sensor (DEXCOM G6 SENSOR) MISC Use as directed for continuous glucose monitoring   Change every 10 days, Normal      Continuous Blood Gluc Transmit (DEXCOM G6 TRANSMITTER) MISC Use as directed for continuous glucose monitoring-Change every 3 months, Normal      [employer] VITAMIN B-12 1000 MCG tablet TAKE 1 TABLET BY MOUTH EVERY DAY, Normal      diphenoxylate-atropine (LOMOTIL) 2 5-0 025 mg per tablet Take 1 tablet by mouth 4 (four) times a day as needed for diarrhea, Historical Med      escitalopram (LEXAPRO) 10 mg tablet Take 1 tablet (10 mg total) by mouth daily at bedtime, Starting Tue 8/13/2019, Normal      famotidine (PEPCID) 20 mg tablet Take 20 mg by mouth daily Resume on 8/14, Historical Med      gabapentin (NEURONTIN) 250 mg/5 mL solution Take 12 mL (600 mg total) by mouth daily at bedtime, Starting Thu 3/7/2019, Normal      glucagon (GLUCAGON EMERGENCY) 1 MG injection Inject 1 mg under the skin once as needed for low blood sugar for up to 1 dose, Starting Sun 8/25/2019, Print      glucose 4 g chewable tablet Chew 3 tablets (12 g total) as needed for low blood sugar, Starting Wed 8/15/2018, Normal      glucose blood (ONE TOUCH ULTRA TEST) test strip 1 each by Other route 3 (three) times a day Use as instructed, Starting Tue 1/29/2019, Normal      Incontinence Supplies (MALE URINAL) MISC by Does not apply route daily, Starting Mon 9/24/2018, Print      insulin glargine (LANTUS) 100 units/mL subcutaneous injection Inject 20 Units under the skin daily, Starting Mon 10/7/2019, No Print      Insulin Syringe-Needle U-100 (B-D INS SYR ULTRAFINE  3CC/30G) 30G X 1/2" 0 3 ML MISC by Does not apply route 4 (four) times a day, Starting Fri 10/26/2018, Normal      Insulin Syringe-Needle U-100 (B-D INS SYRINGE 0 5CC/30GX1/2") 30G X 1/2" 0 5 ML MISC Inject under the skin 4 (four) times a day, Starting Wed 8/14/2019, Normal      ONETOUCH DELICA LANCETS 68J MISC by Does not apply route 3 (three) times a day, Starting Tue 11/27/2018, Normal      SBI/Protein Isolate (ENTERAGAM) 5 g PACK Take by mouth, Historical Med      insulin aspart (NovoLOG) 100 units/mL injection Inject 4 Units under the skin 3 (three) times a day before meals Plus scale, Starting Mon 10/7/2019, No Print      LUCENTIS 0 3 MG/0 05ML Starting Tue 9/11/2018, Historical Med      mupirocin (BACTROBAN) 2 % cream Apply topically daily, Starting Tue 10/1/2019, Normal           No discharge procedures on file      ED Provider  Electronically Signed by           Edith Bartlett MD  01/10/20 6475

## 2020-01-09 NOTE — ASSESSMENT & PLAN NOTE
Patient presents with his wife as requested by his PCP due to some recent cognitive changes  Per the wife the only change has been in regards to forgetting his daughters name on a few occasions over the past few months  His wife noticed an overall decline in his cognition following the stroke in 2018 however she feels that he is improving with time and is now back to 75%  No other new complaints or changes and she feels that he is overall doing very well  He did have difficulty on formal memory testing in the office today (13/30) however unclear what is actual baseline was prior to the stroke  He is functioning well at home and is able to perform his ADLs at this time  Discussed medication options for dementia however there are no studies that clearly show a medication is helpful for vascular dementia  They are not interested in any medication trial at this time and will continue to monitor for now  He has not been cleared to return to driving at this time  At the last visit with Dr Robert Díaz (3/2019) he was to refrain from driving and possibly repeat the FTD test in a few months  His initial testing results had recommended on the road testing through [facility] due to difficulty from a cognitive perspective  At this time will consider repeat testing at his next follow up if he continues to improve from a cognitive standpoint  He was encouraged to keep his mind more active with things such as reading, crosswords, puzzles and Solitaire  From a stroke perspective he denies any new TIA or stroke like symptoms  He will remain on ASA daily along with medication for his BP, cholesterol and DM  He follows with his PCP and Endocrinologist   I do not see any recent lipid panel and will order this now  He was to try and monitor his pressure at home with a goal of 130/80 or below    If he has any symptoms concerning for recurrent TIA or stroke such as sudden painless loss of vision or double vision, new difficulty speaking or swallowing, vertigo/room spinning that does not quickly resolve, or new weakness/numbness affecting 1 side of the body he should be seen at the nearest emergency room immediately

## 2020-01-11 ENCOUNTER — APPOINTMENT (OUTPATIENT)
Dept: LAB | Facility: CLINIC | Age: 60
End: 2020-01-11
Payer: COMMERCIAL

## 2020-01-11 DIAGNOSIS — I12.9 BENIGN HYPERTENSION WITH CKD (CHRONIC KIDNEY DISEASE) STAGE IV (HCC): ICD-10-CM

## 2020-01-11 DIAGNOSIS — N18.4 BENIGN HYPERTENSION WITH CKD (CHRONIC KIDNEY DISEASE) STAGE IV (HCC): ICD-10-CM

## 2020-01-11 DIAGNOSIS — I63.312 CEREBROVASCULAR ACCIDENT (CVA) DUE TO THROMBOSIS OF LEFT MIDDLE CEREBRAL ARTERY (HCC): ICD-10-CM

## 2020-01-11 DIAGNOSIS — N32.81 OVERACTIVE BLADDER: ICD-10-CM

## 2020-01-11 DIAGNOSIS — E11.65 TYPE 2 DIABETES MELLITUS WITH HYPERGLYCEMIA, WITH LONG-TERM CURRENT USE OF INSULIN (HCC): ICD-10-CM

## 2020-01-11 DIAGNOSIS — N39.41 URGE INCONTINENCE: ICD-10-CM

## 2020-01-11 DIAGNOSIS — Z79.4 TYPE 2 DIABETES MELLITUS WITH HYPERGLYCEMIA, WITH LONG-TERM CURRENT USE OF INSULIN (HCC): ICD-10-CM

## 2020-01-11 LAB
ANION GAP SERPL CALCULATED.3IONS-SCNC: 8 MMOL/L (ref 4–13)
BASOPHILS # BLD AUTO: 0.03 THOUSANDS/ΜL (ref 0–0.1)
BASOPHILS NFR BLD AUTO: 0 % (ref 0–1)
BUN SERPL-MCNC: 54 MG/DL (ref 5–25)
CALCIUM SERPL-MCNC: 8.9 MG/DL (ref 8.3–10.1)
CHLORIDE SERPL-SCNC: 105 MMOL/L (ref 100–108)
CHOLEST SERPL-MCNC: 80 MG/DL (ref 50–200)
CO2 SERPL-SCNC: 26 MMOL/L (ref 21–32)
CREAT SERPL-MCNC: 4.31 MG/DL (ref 0.6–1.3)
EOSINOPHIL # BLD AUTO: 0.13 THOUSAND/ΜL (ref 0–0.61)
EOSINOPHIL NFR BLD AUTO: 2 % (ref 0–6)
ERYTHROCYTE [DISTWIDTH] IN BLOOD BY AUTOMATED COUNT: 13 % (ref 11.6–15.1)
EST. AVERAGE GLUCOSE BLD GHB EST-MCNC: 174 MG/DL
GFR SERPL CREATININE-BSD FRML MDRD: 14 ML/MIN/1.73SQ M
GLUCOSE P FAST SERPL-MCNC: 134 MG/DL (ref 65–99)
HBA1C MFR BLD: 7.7 % (ref 4.2–6.3)
HCT VFR BLD AUTO: 39.9 % (ref 36.5–49.3)
HDLC SERPL-MCNC: 31 MG/DL
HGB BLD-MCNC: 12.8 G/DL (ref 12–17)
IMM GRANULOCYTES # BLD AUTO: 0.04 THOUSAND/UL (ref 0–0.2)
IMM GRANULOCYTES NFR BLD AUTO: 1 % (ref 0–2)
LDLC SERPL CALC-MCNC: 20 MG/DL (ref 0–100)
LYMPHOCYTES # BLD AUTO: 1.81 THOUSANDS/ΜL (ref 0.6–4.47)
LYMPHOCYTES NFR BLD AUTO: 22 % (ref 14–44)
MCH RBC QN AUTO: 28.8 PG (ref 26.8–34.3)
MCHC RBC AUTO-ENTMCNC: 32.1 G/DL (ref 31.4–37.4)
MCV RBC AUTO: 90 FL (ref 82–98)
MONOCYTES # BLD AUTO: 0.62 THOUSAND/ΜL (ref 0.17–1.22)
MONOCYTES NFR BLD AUTO: 8 % (ref 4–12)
NEUTROPHILS # BLD AUTO: 5.45 THOUSANDS/ΜL (ref 1.85–7.62)
NEUTS SEG NFR BLD AUTO: 67 % (ref 43–75)
NONHDLC SERPL-MCNC: 49 MG/DL
NRBC BLD AUTO-RTO: 0 /100 WBCS
PLATELET # BLD AUTO: 171 THOUSANDS/UL (ref 149–390)
PMV BLD AUTO: 10.4 FL (ref 8.9–12.7)
POTASSIUM SERPL-SCNC: 5.1 MMOL/L (ref 3.5–5.3)
RBC # BLD AUTO: 4.44 MILLION/UL (ref 3.88–5.62)
SODIUM SERPL-SCNC: 139 MMOL/L (ref 136–145)
TRIGL SERPL-MCNC: 143 MG/DL
WBC # BLD AUTO: 8.08 THOUSAND/UL (ref 4.31–10.16)

## 2020-01-11 PROCEDURE — 83036 HEMOGLOBIN GLYCOSYLATED A1C: CPT

## 2020-01-11 PROCEDURE — 3066F NEPHROPATHY DOC TX: CPT | Performed by: INTERNAL MEDICINE

## 2020-01-11 PROCEDURE — 36415 COLL VENOUS BLD VENIPUNCTURE: CPT

## 2020-01-11 PROCEDURE — 80061 LIPID PANEL: CPT

## 2020-01-11 PROCEDURE — 87086 URINE CULTURE/COLONY COUNT: CPT

## 2020-01-11 PROCEDURE — 85025 COMPLETE CBC W/AUTO DIFF WBC: CPT

## 2020-01-11 PROCEDURE — 80048 BASIC METABOLIC PNL TOTAL CA: CPT

## 2020-01-11 NOTE — PROGRESS NOTES
Consultation - Cardiology Office  [facility]  Genesis Santiago 61 y o  male MRN: [id number]  : 1960  Unit/Bed#:  Encounter: [id number]      Assessment:     1  Benign hypertension with CKD (chronic kidney disease) stage IV ([facility] )    2  Cerebrovascular accident (CVA) due to thrombosis of left middle cerebral artery (HCC)    3  Stage 4 chronic kidney disease ([facility] )    4  Pre-operative cardiovascular examination    5  Mixed hyperlipidemia    6  Diabetic polyneuropathy associated with type 2 diabetes mellitus ([facility] )    7  Gastroesophageal reflux disease without esophagitis        Discussion summary and Plan:    1  Preoperative clearance  Patient need cardiology preoperative clearance for Botox injection  He had abnormal EKG but his nuclear stress test and echo shows normal LV systolic function and no ischemia  There is no clinical evidence of heart failure  I believe he is in optimal condition for the procedure as planned and low risk for the procedure from cardiac point of view  2  Exertion shortness of breath  Chronic not changed  Most likely secondary to deconditioning  Patient's result discussed with him  And there is no worsening of his shortness of breath  3  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  Patient on medical Rx he has recovered lot of ambulatory function  Has some memory issues  4  Dyslipidemia  Currently high intensity statin  Continue Lipitor  Follow-up LFT closely    5  Essential hypertension  Patient blood pressure may be labile but today reading is acceptable continue amlodipine, Coreg lisinopril  Unfortunately patient's kidney function is getting worse management as per Nephrology  6  CKD stage 4  Follow up with Nephrology  Patient regularly follows up with Nephrology  Creatinine has worsened potassium was acceptable last labs were done on 2020  He follows up with Nephrology closely  7  Incontinent    Could be due to dysautonomia or neurogenic bladder  Now need Botox injections    All issues discussed with patient and patient's wife at length  Further plan as also stress test become available  Thank you for your consultation  If you have any question please call me at [phone]    Counseling :  A description of the counseling  Goals and Barriers  Patient's ability to self care: Yes  Medication side effect reviewed with patient in detail and all their questions answered to their satisfaction  Primary Care Physician t: Jayson Cao, DO      HPI :     Mati Arroyo is a 61y o  year old male who was was initially seen by us many years ago was recently [facility] with near syncopal episode  Patient has past medical significant for chronic kidney disease, diabetes mellitus, diarrhea, history of stroke who presents with unresponsive episode  Patient was going to flea market in his car with his family, he was not driving  Family noted him to be very sweaty and pale, and became unresponsive  Patient was brought to the emergency department and noted to have blood glucose of 40  Patient is not doing well  He denies any new complaints  His echo from [facility] reviewed  During workup he was found to have CKD stage 3/4  He now follows up with [facility]  He is doing well  His kidney function has stabilized  He does occasionally get short of breath when he walks  After his stroke is not that active  He is having lot of problem with his urine incontinent  He is scheduled to have a procedure done by urology  He may need clearance for that procedure  01/15/2020  Above reviewed  Patient came for follow-up  He has multiple medical problem including worsening kidney function now CKD stage 4/5, diabetes mellitus, history of stroke, syncope and abnormal EKG  He also had obesity with BMI around 35   It has been noted that he is having lot of problem with his urination add he is incontinent many times and he is scheduled to have board ex injection  Hence he was sent to us for cardiology clearance  He underwent nuclear stress test 10/14/2019 which was normal and his EF was 50%  He came with his wife  No fever no chills no nausea no vomiting no PND no orthopnea no leg swelling no other issues  His echo and stress test reviewed  No more hypoglycemia like episode  He came with his wife who provided additional information  Review of Systems   Constitutional: Negative for activity change, chills, diaphoresis, fever and unexpected weight change  HENT: Negative for congestion  Eyes: Negative for discharge and redness  Respiratory: Negative for cough, chest tightness, shortness of breath and wheezing  Cardiovascular: Negative  Negative for chest pain, palpitations and leg swelling  Gastrointestinal: Negative for abdominal pain, diarrhea and nausea  Endocrine: Negative  Genitourinary: Negative for decreased urine volume and urgency  May need Botox injection in the bladder   Musculoskeletal: Negative  Negative for arthralgias, back pain and gait problem  Skin: Negative for rash and wound  Allergic/Immunologic: Negative  Neurological: Negative for dizziness, seizures, syncope, weakness, light-headedness and headaches  History of stroke   Hematological: Negative  Psychiatric/Behavioral: Negative for agitation and confusion  The patient is nervous/anxious          Historical Information   Past Medical History:   Diagnosis Date    Diabetes mellitus ([facility] [address] [facility] )     Hypercholesteremia     Hyperlipidemia     Hypertension     Neuropathy     Obesity     Osteomyelitis ([facility] )     last assessed 11/4/16    PVC's (premature ventricular contractions)     sees cardiology Dr Suhas camargo    Stroke [facility])     last weeof July 2018 [address] [facility]     Past Surgical History:   Procedure Laterality Date    ABDOMINAL SURGERY      CHOLECYSTECTOMY Percutaneous    CYSTOSCOPY      OTHER SURGICAL HISTORY      "stimulator to control bowel movements"    IL ESOPHAGOGASTRODUODENOSCOPY TRANSORAL DIAGNOSTIC N/A 9/27/2016    Procedure: ESOPHAGOGASTRODUODENOSCOPY (EGD); Surgeon: Nicole Landaverde MD;  Location: [facility];   Service: Gastroenterology    IL LAP,CHOLECYSTECTOMY N/A 2/29/2016    Procedure: LAPAROSCOPIC CHOLECYSTECTOMY ;  Surgeon: Patrice Simons DO;  Location: [facility];  Service: General    ROTATOR CUFF REPAIR Right     TOE AMPUTATION Right 10/28/2016    Procedure: 3RD TOE AMPUTATION ;  Surgeon: Maria Isabel Miller DPM;  Location: [facility];  Service:      Social History     Substance and Sexual Activity   Alcohol Use No     Social History     Substance and Sexual Activity   Drug Use No     Social History     Tobacco Use   Smoking Status Never Smoker   Smokeless Tobacco Never Used     Family History:   Family History   Problem Relation Age of Onset    Leukemia Mother     Liver disease Mother     Lung cancer Mother         heavy smoker - 3 ppd    Heart disease Father     Liver disease Father     Multiple myeloma Sister     Breast cancer Sister     Urolithiasis Family     Alcohol abuse Neg Hx     Depression Neg Hx     Drug abuse Neg Hx     Substance Abuse Neg Hx     Mental illness Neg Hx        Meds/Allergies     No Known Allergies    Current Outpatient Medications:     amLODIPine (NORVASC) 5 mg tablet, Take 1 tablet (5 mg total) by mouth every 12 (twelve) hours, Disp: 180 tablet, Rfl: 1    aspirin (ECOTRIN LOW STRENGTH) 81 mg EC tablet, Take 81 mg by mouth daily Resume on 8/14, Disp: , Rfl:     atorvastatin (LIPITOR) 80 mg tablet, Take 1 tablet (80 mg total) by mouth daily with dinner, Disp: 90 tablet, Rfl: 1    Blood Glucose Monitoring Suppl (TRUE METRIX METER) w/Device KIT, Use to test blood sugars 3 times daily, Disp: 1 kit, Rfl: 0    carvedilol (COREG) 6 25 mg tablet, Take 1 tablet (6 25 mg total) by mouth 2 (two) times a day with meals, Disp: 180 tablet, Rfl: 1    Cholecalciferol (VITAMIN D3) 72760 units CAPS, Take 1 capsule (50,000 Units total) by mouth once a week, Disp: 5 capsule, Rfl: 5    CVS VITAMIN B-12 1000 MCG tablet, TAKE 1 TABLET BY MOUTH EVERY DAY, Disp: 90 tablet, Rfl: 1    diphenoxylate-atropine (LOMOTIL) 2 5-0 025 mg per tablet, Take 1 tablet by mouth 4 (four) times a day as needed for diarrhea, Disp: , Rfl:     escitalopram (LEXAPRO) 10 mg tablet, Take 1 tablet (10 mg total) by mouth daily at bedtime, Disp: 90 tablet, Rfl: 0    famotidine (PEPCID) 20 mg tablet, Take 20 mg by mouth daily Resume on 8/14, Disp: , Rfl:     gabapentin (NEURONTIN) 250 mg/5 mL solution, Take 12 mL (600 mg total) by mouth daily at bedtime, Disp: 470 mL, Rfl: 2    glucose blood test strip, Use to test blood sugars 3 times daily, Disp: 300 each, Rfl: 1    insulin aspart (NovoLOG) 100 units/mL injection, Inject 4 Units under the skin 3 (three) times a day before meals Plus scale (Patient taking differently: Inject 5 Units under the skin 3 (three) times a day before meals Plus scale), Disp: , Rfl:     insulin glargine (LANTUS) 100 units/mL subcutaneous injection, Inject 20 Units under the skin daily (Patient taking differently: Inject 25 Units under the skin daily ), Disp: 10 mL, Rfl: 0    Insulin Syringe-Needle U-100 (B-D INS SYR ULTRAFINE  3CC/30G) 30G X 1/2" 0 3 ML MISC, by Does not apply route 4 (four) times a day, Disp: 360 each, Rfl: 1    LUCENTIS 0 3 MG/0 05ML, , Disp: , Rfl:     ONETOUCH DELICA LANCETS 63X MISC, by Does not apply route 3 (three) times a day, Disp: 270 each, Rfl: 1    SBI/Protein Isolate (ENTERAGAM) 5 g PACK, Take by mouth, Disp: , Rfl:     Blood Pressure Monitoring (BLOOD PRESSURE CUFF) MISC, Use to check blood pressure before taking blood pressure medication and 1 hour after and follow instructions provided in discharge instructions based on the readings   (Patient not taking: Reported on 1/15/2020), Disp: 1 each, Rfl: 0    Continuous Blood Gluc  ([address]) LANCE, Use as directed for continuous glucose monitoring (Patient not taking: Reported on 1/15/2020), Disp: 1 Device, Rfl: 0    Continuous Blood Gluc Sensor (DEXCOM G6 SENSOR) MISC, Use as directed for continuous glucose monitoring  Change every 10 days (Patient not taking: Reported on 1/15/2020), Disp: 1 each, Rfl: 11    Continuous Blood Gluc Transmit (DEXCOM G6 TRANSMITTER) MISC, Use as directed for continuous glucose monitoring-Change every 3 months (Patient not taking: Reported on 1/15/2020), Disp: 1 each, Rfl: 3    glucagon (GLUCAGON EMERGENCY) 1 MG injection, Inject 1 mg under the skin once as needed for low blood sugar for up to 1 dose (Patient not taking: Reported on 1/15/2020), Disp: 1 kit, Rfl: 2    glucose 4 g chewable tablet, Chew 3 tablets (12 g total) as needed for low blood sugar (Patient not taking: Reported on 1/15/2020), Disp: 50 tablet, Rfl: 0    Incontinence Supplies (MALE URINAL) MISC, by Does not apply route daily (Patient not taking: Reported on 1/15/2020), Disp: 6 each, Rfl: 3    Insulin Syringe-Needle U-100 (B-D INS SYRINGE 0 5CC/30GX1/2") 30G X 1/2" 0 5 ML MISC, Inject under the skin 4 (four) times a day, Disp: 360 each, Rfl: 1    mupirocin (BACTROBAN) 2 % cream, Apply topically daily (Patient not taking: Reported on 1/8/2020), Disp: 30 g, Rfl: 0    Vitals: Blood pressure 110/80, pulse 73, height 5' 10" (1 778 m), weight 109 kg (241 lb), SpO2 97 %  Body mass index is 34 58 kg/m²  Vitals:    01/15/20 1517   Weight: 109 kg (241 lb)     BP Readings from Last 3 Encounters:   01/15/20 110/80   01/09/20 (!) 183/92   01/08/20 148/67         Physical Exam   Constitutional: He is oriented to person, place, and time  He appears well-developed and well-nourished  No distress  HENT:   Head: Normocephalic and atraumatic  Eyes: Pupils are equal, round, and reactive to light  Neck: Neck supple  No JVD present   No tracheal deviation present  No thyromegaly present  Cardiovascular: Normal rate, regular rhythm, S1 normal, S2 normal and intact distal pulses  Exam reveals no gallop, no S3, no S4, no distant heart sounds and no friction rub  Murmur heard  Systolic (ejection) murmur is present with a grade of 2/6  Pulmonary/Chest: Effort normal and breath sounds normal  No respiratory distress  He has no wheezes  He has no rales  He exhibits no tenderness  Abdominal: Soft  Bowel sounds are normal  He exhibits no distension  There is no tenderness  Musculoskeletal: He exhibits no edema or deformity  Neurological: He is alert and oriented to person, place, and time  Skin: Skin is warm and dry  No rash noted  He is not diaphoretic  No pallor  Psychiatric: He has a normal mood and affect  His behavior is normal  Judgment normal          Diagnostic Studies Review Cardio:  Nuclear stress test   Nuclear stress test 10/14/2019 were nonischemic EF around 50%  Echo Doppler  Echo Doppler 10/29/2018 is below  EKG:  EKG today shows normal sinus rhythm nonspecific ST changes heart rate 80 beats per minute  Twelve lead EKG 01/15/2020 shows normal sinus rhythm LVH by voltage  ST abnormality in inferior leads certainly cannot rule ischemia but had a nonischemic nuclear      Cardiac testing:   Results for orders placed during the hospital encounter of 10/28/18   Echo complete with contrast if indicated    Narrative [place] [address], [address] [facility]  [phone]    Transthoracic Echocardiogram  2D, M-mode, Doppler, and Color Doppler    Study date:  29-Oct-2018    Patient: Yolanda Gore  MR number: [id number]  Account number: [de-identified]  : 1960  Age: 62 years  Gender: Male  Status: Inpatient  Location: Bedside  Height: 68 in  Weight: 232 5 lb  BP: 142/ 76 mmHg    Indications: TIA    Diagnoses: G45 9 - Transient cerebral ischemic attack, unspecified    Sonographer:  Shanice Heck [facility]  Primary Physician:  Diaz Wilde DO  Referring Physician:  Ezra Collins PA-C  Group:  [facility]  Interpreting Physician:  Deja Booth MD    SUMMARY    LEFT VENTRICLE:  Systolic function was normal  Ejection fraction was estimated to be 55 %  Although no diagnostic regional wall motion abnormality was identified, this possibility cannot be completely excluded on the basis of this study  Wall thickness was increased  Doppler parameters were consistent with abnormal left ventricular relaxation (grade 1 diastolic dysfunction)  AORTIC VALVE:  The valve was functionally bicuspid  Leaflets exhibited moderately increased thickness, moderate calcification, and moderately reduced cuspal separation  The noncoronary cusp appears restricted  There was mild regurgitation  HISTORY: PRIOR HISTORY: DM, Hypertension, Hyperlipidemia, Obesity, PVC's, Stroke    PROCEDURE: The procedure was performed at the bedside  This was a routine study  The transthoracic approach was used  The study included complete 2D imaging, M-mode, complete spectral Doppler, and color Doppler  The heart rate was 72 bpm,  at the start of the study  Images were obtained from the parasternal, apical, subcostal, and suprasternal notch acoustic windows  Echocardiographic views were limited due to decreased penetration and lung interference  This was a  technically difficult study  LEFT VENTRICLE: Size was normal  Systolic function was normal  Ejection fraction was estimated to be 55 %  Although no diagnostic regional wall motion abnormality was identified, this possibility cannot be completely excluded on the basis  of this study  Wall thickness was increased  DOPPLER: There was an increased relative contribution of atrial contraction to ventricular filling  Doppler parameters were consistent with abnormal left ventricular relaxation (grade 1  diastolic dysfunction)      RIGHT VENTRICLE: The size was normal  Systolic function was normal     LEFT ATRIUM: The atrium was mildly dilated  RIGHT ATRIUM: Size was normal     MITRAL VALVE: There was mild to moderate annular calcification  Valve structure was normal  There was mild calcification  There was normal leaflet separation  DOPPLER: There was no evidence for stenosis  There was no significant  regurgitation  AORTIC VALVE: The valve was functionally bicuspid  Leaflets exhibited moderately increased thickness, moderate calcification, and moderately reduced cuspal separation  The noncoronary cusp appears restricted  DOPPLER: There was no evidence  for stenosis  There was mild regurgitation  TRICUSPID VALVE: The valve structure was normal  There was normal leaflet separation  DOPPLER: There was no evidence for stenosis  There was no significant regurgitation  PULMONIC VALVE: Leaflets exhibited normal thickness, no calcification, and normal cuspal separation  DOPPLER: The transpulmonic velocity was within the normal range  There was mild regurgitation  PERICARDIUM: There was no pericardial effusion  The pericardium was normal in appearance  AORTA: The root exhibited normal size  SYSTEMIC VEINS: IVC: The inferior vena cava was normal in size and course   Respirophasic changes were normal     SYSTEM MEASUREMENT TABLES    2D  %FS: 24 %  AV Diam: 3 33 cm  EDV(Teich): 140 07 ml  EF Biplane: 58 38 %  EF(Cube): 56 1 %  EF(Teich): 47 36 %  ESV(Cube): 68 34 ml  ESV(Teich): 73 73 ml  IVSd: 1 24 cm  LA Area: 21 11 cm2  LA Diam: 3 95 cm  LVEDV MOD A2C: 135 47 ml  LVEDV MOD A4C: 174 64 ml  LVEDV MOD BP: 157 66 ml  LVEF MOD A2C: 59 41 %  LVEF MOD A4C: 56 77 %  LVESV MOD A2C: 54 99 ml  LVESV MOD A4C: 75 49 ml  LVESV MOD BP: 65 62 ml  LVIDd: 5 38 cm  LVIDs: 4 09 cm  LVLd A2C: 8 91 cm  LVLd A4C: 9 42 cm  LVLs A2C: 8 03 cm  LVLs A4C: 7 66 cm  LVOT Diam: 2 08 cm  LVPWd: 1 26 cm  RA Area: 18 12 cm2  RV Diam: 3 33 cm  SI(Cube): 40 06 ml/m2  SI(Teich): 30 43 ml/m2  SV MOD A2C: 80 48 ml  SV MOD A4C: 99 15 ml  SV(Cube): 87 33 ml  SV(Teich): 66 34 ml    CW  AR Dec Glascock: 1 95 m/s2  AR Dec Time: 2036 6 ms  AR PHT: 590 61 ms  AR Vmax: 3 97 m/s  AR maxP 14 mmHg  AV Env  Ti: 310 54 ms  AV MaxPG: 15 41 mmHg  AV SI: 172 89 ml/m2  AV SV: 376 91 ml  AV VTI: 43 31 cm  AV Vmax: 1 96 m/s  AV Vmean: 1 39 m/s  AV meanP 41 mmHg    MM  TAPSE: 1 75 cm    PW  E': 0 07 m/s  E/E': 10 46  MV A Carlin: 0 93 m/s  MV Dec Glascock: 3 63 m/s2  MV DecT: 190 71 ms  MV E Carlin: 0 69 m/s  MV E/A Ratio: 0 75    [employer] Accredited Echocardiography [facility]    Prepared and electronically signed by    Jelani Jamison MD  Signed 29-Oct-2018 13:16:25         Lab Review   Lab Results   Component Value Date    WBC 8 08 2020    HGB 12 8 2020    HCT 39 9 2020    MCV 90 2020    RDW 13 0 2020     2020     BMP:  Lab Results   Component Value Date    SODIUM 139 2020    K 5 1 2020     2020    CO2 26 2020    ANIONGAP 9 2016    BUN 54 (H) 2020    CREATININE 4 31 (H) 2020    GLUC 234 (H) 2020    GLUF 134 (H) 2020    CALCIUM 8 9 2020    EGFR 14 2020    MG 2 0 2019     LFT:  Lab Results   Component Value Date    AST 12 2020    ALT 21 2020    ALKPHOS 173 (H) 2020    TP 7 1 2020    ALB 3 2 (L) 2020      Lab Results   Component Value Date    TSB3KRYHBQDW 1 946 2018     Lab Results   Component Value Date    HGBA1C 7 7 (H) 2020     Lipid Profile:   Lab Results   Component Value Date    CHOLESTEROL 80 2020    HDL 31 (L) 2020    LDLCALC 20 2020    TRIG 143 2020     Lab Results   Component Value Date    CHOLESTEROL 80 2020    CHOLESTEROL 70 10/29/2018     Lab Results   Component Value Date    TROPONINI <0 02 2019     No results found for: NTBNP         Dr Jacquelyn Ny MD [facility]      "This note has been constructed using a voice recognition system  Therefore there may be syntax, spelling, and/or grammatical errors   Please call if you have any questions  "

## 2020-01-12 LAB — BACTERIA UR CULT: NORMAL

## 2020-01-15 ENCOUNTER — OFFICE VISIT (OUTPATIENT)
Dept: CARDIOLOGY CLINIC | Facility: CLINIC | Age: 60
End: 2020-01-15
Payer: COMMERCIAL

## 2020-01-15 VITALS
BODY MASS INDEX: 34.5 KG/M2 | HEIGHT: 70 IN | SYSTOLIC BLOOD PRESSURE: 110 MMHG | OXYGEN SATURATION: 97 % | HEART RATE: 73 BPM | DIASTOLIC BLOOD PRESSURE: 80 MMHG | WEIGHT: 241 LBS

## 2020-01-15 DIAGNOSIS — E78.2 MIXED HYPERLIPIDEMIA: ICD-10-CM

## 2020-01-15 DIAGNOSIS — K21.9 GASTROESOPHAGEAL REFLUX DISEASE WITHOUT ESOPHAGITIS: ICD-10-CM

## 2020-01-15 DIAGNOSIS — I63.312 CEREBROVASCULAR ACCIDENT (CVA) DUE TO THROMBOSIS OF LEFT MIDDLE CEREBRAL ARTERY (HCC): ICD-10-CM

## 2020-01-15 DIAGNOSIS — Z01.810 PRE-OPERATIVE CARDIOVASCULAR EXAMINATION: ICD-10-CM

## 2020-01-15 DIAGNOSIS — N18.4 BENIGN HYPERTENSION WITH CKD (CHRONIC KIDNEY DISEASE) STAGE IV (HCC): ICD-10-CM

## 2020-01-15 DIAGNOSIS — Z79.4 TYPE 2 DIABETES MELLITUS WITH HYPERGLYCEMIA, WITH LONG-TERM CURRENT USE OF INSULIN (HCC): Primary | ICD-10-CM

## 2020-01-15 DIAGNOSIS — I12.9 BENIGN HYPERTENSION WITH CKD (CHRONIC KIDNEY DISEASE) STAGE IV (HCC): ICD-10-CM

## 2020-01-15 DIAGNOSIS — N18.4 STAGE 4 CHRONIC KIDNEY DISEASE (HCC): ICD-10-CM

## 2020-01-15 DIAGNOSIS — E11.65 TYPE 2 DIABETES MELLITUS WITH HYPERGLYCEMIA, WITH LONG-TERM CURRENT USE OF INSULIN (HCC): Primary | ICD-10-CM

## 2020-01-15 DIAGNOSIS — E11.42 DIABETIC POLYNEUROPATHY ASSOCIATED WITH TYPE 2 DIABETES MELLITUS (HCC): ICD-10-CM

## 2020-01-15 PROCEDURE — 3066F NEPHROPATHY DOC TX: CPT | Performed by: INTERNAL MEDICINE

## 2020-01-15 PROCEDURE — 93000 ELECTROCARDIOGRAM COMPLETE: CPT | Performed by: INTERNAL MEDICINE

## 2020-01-15 PROCEDURE — 99214 OFFICE O/P EST MOD 30 MIN: CPT | Performed by: INTERNAL MEDICINE

## 2020-01-16 DIAGNOSIS — I10 ESSENTIAL HYPERTENSION: Chronic | ICD-10-CM

## 2020-01-16 DIAGNOSIS — I12.9 BENIGN HYPERTENSION WITH CKD (CHRONIC KIDNEY DISEASE) STAGE III (HCC): ICD-10-CM

## 2020-01-16 DIAGNOSIS — E78.2 MIXED HYPERLIPIDEMIA: ICD-10-CM

## 2020-01-16 DIAGNOSIS — I69.30 HISTORY OF ISCHEMIC CEREBROVASCULAR ACCIDENT (CVA) WITH RESIDUAL DEFICIT: ICD-10-CM

## 2020-01-16 DIAGNOSIS — N18.30 STAGE 3 CHRONIC KIDNEY DISEASE (HCC): ICD-10-CM

## 2020-01-16 DIAGNOSIS — I63.9 ISCHEMIC CEREBROVASCULAR ACCIDENT (CVA) (HCC): ICD-10-CM

## 2020-01-16 DIAGNOSIS — N18.30 BENIGN HYPERTENSION WITH CKD (CHRONIC KIDNEY DISEASE) STAGE III (HCC): ICD-10-CM

## 2020-01-16 RX ORDER — AMLODIPINE BESYLATE 5 MG/1
5 TABLET ORAL EVERY 12 HOURS
Qty: 180 TABLET | Refills: 1 | Status: SHIPPED | OUTPATIENT
Start: 2020-01-16 | End: 2020-07-03

## 2020-01-16 RX ORDER — ATORVASTATIN CALCIUM 80 MG/1
80 TABLET, FILM COATED ORAL
Qty: 90 TABLET | Refills: 1 | Status: SHIPPED | OUTPATIENT
Start: 2020-01-16 | End: 2020-06-30

## 2020-01-22 DIAGNOSIS — E11.65 TYPE 2 DIABETES MELLITUS WITH HYPERGLYCEMIA, WITH LONG-TERM CURRENT USE OF INSULIN (HCC): ICD-10-CM

## 2020-01-22 DIAGNOSIS — Z79.4 TYPE 2 DIABETES MELLITUS WITH HYPERGLYCEMIA, WITH LONG-TERM CURRENT USE OF INSULIN (HCC): ICD-10-CM

## 2020-01-27 ENCOUNTER — TELEPHONE (OUTPATIENT)
Dept: UROLOGY | Facility: MEDICAL CENTER | Age: 60
End: 2020-01-27

## 2020-01-27 ENCOUNTER — TELEPHONE (OUTPATIENT)
Dept: INTERNAL MEDICINE CLINIC | Facility: CLINIC | Age: 60
End: 2020-01-27

## 2020-01-27 NOTE — TELEPHONE ENCOUNTER
Patient of Dr Kartik Morales seen in [facility]  Calling to see if its possible to reschedule surgery for the 17th rather than the 18th      Can be reached at [phone]

## 2020-01-27 NOTE — TELEPHONE ENCOUNTER
PT'S WIFE STATED THAT HE'S HAVING BOTOX ON HIS BLADDER AND HE WAS TOLD THAT HE NEEDS TO STOP THE ASPIRIN 1 WEEK BEFORE HAND   PT'S WIFE STATED THAT HE DOESN'T NEED A PRE OP EXAM BUT THAT SHE'D LIKE TO SPEAK WITH YOU ABOUT THIS    SURGERY IS 2/18

## 2020-01-28 ENCOUNTER — TELEPHONE (OUTPATIENT)
Dept: UROLOGY | Facility: MEDICAL CENTER | Age: 60
End: 2020-01-28

## 2020-01-28 NOTE — TELEPHONE ENCOUNTER
rec'd a message that wife called this AM and LM for Call back    -----------------    Called pt's provided phone #[phone]), rec'd VM and LM on VM for CB to office

## 2020-01-28 NOTE — TELEPHONE ENCOUNTER
I spoke with Asad's wife  I told her that the 17th was not available  She said to keep it on the 18th

## 2020-01-28 NOTE — TELEPHONE ENCOUNTER
Pt's wife called regarding 01/30/20 10:30am H&P appointment with [facility] she provides transportation and cannot get him to appointments before 2:30pm, please review and call her directly to reschedule

## 2020-01-29 ENCOUNTER — TELEPHONE (OUTPATIENT)
Dept: INTERNAL MEDICINE CLINIC | Facility: CLINIC | Age: 60
End: 2020-01-29

## 2020-01-29 NOTE — TELEPHONE ENCOUNTER
Patient rescheduled for 2/4/20 at 230pm in the [facility] office  Please call and confirm with patient's wife  If this does not work they may have to go to another office

## 2020-01-29 NOTE — TELEPHONE ENCOUNTER
Pt getting botox injections into his bladder on 2/18  They recommended stopping daily aspirin one week before but wanted your input on when you think he should stop it  Please advise

## 2020-01-29 NOTE — TELEPHONE ENCOUNTER
Called and spoke to pt's wife    He was advised to stop aspirin 81mg for upcoming urologic procedure    Hx of DM, CKD and previous CVS(no issues from CVA/TIA in over 1 year)    Plan - pt cannot have procedure without stopping aspirin  See no contraindication to stopping aspirin for Minimum amt of time pre-op and to resume aspirin again after procedure as soon as surgeon feels it is safe/appropriate to resume  Wife verbalized understanding and patient is doing better, eating better and losing weight

## 2020-02-01 ENCOUNTER — HOSPITAL ENCOUNTER (EMERGENCY)
Facility: HOSPITAL | Age: 60
Discharge: HOME | End: 2020-02-01
Attending: EMERGENCY MEDICINE | Admitting: EMERGENCY MEDICINE
Payer: COMMERCIAL

## 2020-02-01 ENCOUNTER — APPOINTMENT (EMERGENCY)
Dept: CT IMAGING | Facility: HOSPITAL | Age: 60
End: 2020-02-01
Payer: COMMERCIAL

## 2020-02-01 VITALS
TEMPERATURE: 98.5 F | WEIGHT: 240.3 LBS | OXYGEN SATURATION: 96 % | RESPIRATION RATE: 20 BRPM | HEART RATE: 72 BPM | BODY MASS INDEX: 34.48 KG/M2 | SYSTOLIC BLOOD PRESSURE: 157 MMHG | DIASTOLIC BLOOD PRESSURE: 63 MMHG

## 2020-02-01 DIAGNOSIS — I10 ESSENTIAL HYPERTENSION: ICD-10-CM

## 2020-02-01 DIAGNOSIS — N18.9 CHRONIC KIDNEY DISEASE, UNSPECIFIED CKD STAGE: ICD-10-CM

## 2020-02-01 DIAGNOSIS — R51.9 ACUTE NONINTRACTABLE HEADACHE, UNSPECIFIED HEADACHE TYPE: Primary | ICD-10-CM

## 2020-02-01 LAB
ALBUMIN SERPL BCP-MCNC: 3.1 G/DL (ref 3.5–5)
ALP SERPL-CCNC: 118 U/L (ref 46–116)
ALT SERPL W P-5'-P-CCNC: 13 U/L (ref 12–78)
ANION GAP SERPL CALCULATED.3IONS-SCNC: 10 MMOL/L (ref 4–13)
AST SERPL W P-5'-P-CCNC: <5 U/L (ref 5–45)
BASOPHILS # BLD AUTO: 0.02 THOUSANDS/ΜL (ref 0–0.1)
BASOPHILS NFR BLD AUTO: 0 % (ref 0–1)
BILIRUB SERPL-MCNC: 0.42 MG/DL (ref 0.2–1)
BUN SERPL-MCNC: 61 MG/DL (ref 5–25)
CALCIUM SERPL-MCNC: 8.6 MG/DL (ref 8.3–10.1)
CHLORIDE SERPL-SCNC: 107 MMOL/L (ref 100–108)
CO2 SERPL-SCNC: 24 MMOL/L (ref 21–32)
CREAT SERPL-MCNC: 4.24 MG/DL (ref 0.6–1.3)
EOSINOPHIL # BLD AUTO: 0.17 THOUSAND/ΜL (ref 0–0.61)
EOSINOPHIL NFR BLD AUTO: 2 % (ref 0–6)
ERYTHROCYTE [DISTWIDTH] IN BLOOD BY AUTOMATED COUNT: 13.2 % (ref 11.6–15.1)
GFR SERPL CREATININE-BSD FRML MDRD: 14 ML/MIN/1.73SQ M
GLUCOSE SERPL-MCNC: 143 MG/DL (ref 65–140)
HCT VFR BLD AUTO: 38 % (ref 36.5–49.3)
HGB BLD-MCNC: 12.1 G/DL (ref 12–17)
IMM GRANULOCYTES # BLD AUTO: 0.02 THOUSAND/UL (ref 0–0.2)
IMM GRANULOCYTES NFR BLD AUTO: 0 % (ref 0–2)
LYMPHOCYTES # BLD AUTO: 1.83 THOUSANDS/ΜL (ref 0.6–4.47)
LYMPHOCYTES NFR BLD AUTO: 23 % (ref 14–44)
MCH RBC QN AUTO: 28.6 PG (ref 26.8–34.3)
MCHC RBC AUTO-ENTMCNC: 31.8 G/DL (ref 31.4–37.4)
MCV RBC AUTO: 90 FL (ref 82–98)
MONOCYTES # BLD AUTO: 0.66 THOUSAND/ΜL (ref 0.17–1.22)
MONOCYTES NFR BLD AUTO: 8 % (ref 4–12)
NEUTROPHILS # BLD AUTO: 5.16 THOUSANDS/ΜL (ref 1.85–7.62)
NEUTS SEG NFR BLD AUTO: 67 % (ref 43–75)
NRBC BLD AUTO-RTO: 0 /100 WBCS
PLATELET # BLD AUTO: 151 THOUSANDS/UL (ref 149–390)
PMV BLD AUTO: 10.8 FL (ref 8.9–12.7)
POTASSIUM SERPL-SCNC: 4.5 MMOL/L (ref 3.5–5.3)
PROT SERPL-MCNC: 7 G/DL (ref 6.4–8.2)
RBC # BLD AUTO: 4.23 MILLION/UL (ref 3.88–5.62)
SODIUM SERPL-SCNC: 141 MMOL/L (ref 136–145)
WBC # BLD AUTO: 7.86 THOUSAND/UL (ref 4.31–10.16)

## 2020-02-01 PROCEDURE — 70450 CT HEAD/BRAIN W/O DYE: CPT

## 2020-02-01 PROCEDURE — 99284 EMERGENCY DEPT VISIT MOD MDM: CPT

## 2020-02-01 PROCEDURE — 80053 COMPREHEN METABOLIC PANEL: CPT | Performed by: EMERGENCY MEDICINE

## 2020-02-01 PROCEDURE — 99284 EMERGENCY DEPT VISIT MOD MDM: CPT | Performed by: EMERGENCY MEDICINE

## 2020-02-01 PROCEDURE — 85025 COMPLETE CBC W/AUTO DIFF WBC: CPT | Performed by: EMERGENCY MEDICINE

## 2020-02-01 PROCEDURE — 36415 COLL VENOUS BLD VENIPUNCTURE: CPT | Performed by: EMERGENCY MEDICINE

## 2020-02-01 RX ORDER — AMLODIPINE BESYLATE 5 MG/1
5 TABLET ORAL ONCE
Status: COMPLETED | OUTPATIENT
Start: 2020-02-01 | End: 2020-02-01

## 2020-02-01 RX ORDER — LIDOCAINE 50 MG/G
1 PATCH TOPICAL ONCE
Status: DISCONTINUED | OUTPATIENT
Start: 2020-02-01 | End: 2020-02-01 | Stop reason: HOSPADM

## 2020-02-01 RX ORDER — CARVEDILOL 6.25 MG/1
6.25 TABLET ORAL 2 TIMES DAILY WITH MEALS
Status: DISCONTINUED | OUTPATIENT
Start: 2020-02-01 | End: 2020-02-01 | Stop reason: HOSPADM

## 2020-02-01 RX ORDER — ACETAMINOPHEN 325 MG/1
975 TABLET ORAL ONCE
Status: COMPLETED | OUTPATIENT
Start: 2020-02-01 | End: 2020-02-01

## 2020-02-01 RX ADMIN — AMLODIPINE BESYLATE 5 MG: 5 TABLET ORAL at 07:18

## 2020-02-01 RX ADMIN — CARVEDILOL 6.25 MG: 6.25 TABLET, FILM COATED ORAL at 07:18

## 2020-02-01 RX ADMIN — LIDOCAINE 1 PATCH: 50 PATCH TOPICAL at 07:15

## 2020-02-01 RX ADMIN — ACETAMINOPHEN 975 MG: 325 TABLET, FILM COATED ORAL at 07:14

## 2020-02-01 RX ADMIN — AMLODIPINE BESYLATE 5 MG: 5 TABLET ORAL at 07:28

## 2020-02-01 NOTE — ED PROVIDER NOTES
History  Chief Complaint   Patient presents with    Headache     left sided headache X 3 days and having pain right leg X2days, history of stroke 1 1/2 yrs ago     Patient is a 24-year-old male with a history of CVA, diabetes, chronic kidney disease, hypertension and hyperlipidemia who presents with a headache  Patient describes a left-sided headache for the past 2-3 days  He describes it as located in his left temple  He denies a history of similar headaches  He has not taken anything for the pain since it started  It has been fairly constant since onset  He denies associated numbness, tingling, weakness, speech difficulty, gait instability, or visual deficits  He also complains pain in his right groin radiating into his right lower leg for the past 2 days  He denies any trauma or injury  He denies any associated back pain, lower extremity numbness, lower extremity weakness, bowel or bladder dysfunction  History provided by:  Patient  Headache   Pain location:  L temporal  Radiates to:  Does not radiate  Duration:  3 days  Timing:  Constant  Progression:  Unchanged  Chronicity:  New  Similar to prior headaches: no    Ineffective treatments:  None tried  Associated symptoms: no abdominal pain, no back pain, no blurred vision, no cough, no diarrhea, no dizziness, no eye pain, no fever, no focal weakness, no loss of balance, no nausea, no neck pain, no neck stiffness, no numbness, no photophobia, no sore throat, no tingling, no visual change, no vomiting and no weakness        Prior to Admission Medications   Prescriptions Last Dose Informant Patient Reported? Taking?    Blood Glucose Monitoring Suppl (TRUE METRIX METER) w/Device KIT   No Yes   Sig: Use to test blood sugars 3 times daily   Blood Pressure Monitoring (BLOOD PRESSURE CUFF) MISC  Spouse/Significant Other No Yes   Sig: Use to check blood pressure before taking blood pressure medication and 1 hour after and follow instructions provided in discharge instructions based on the readings  [employer] VITAMIN B-12 1000 MCG tablet  Spouse/Significant Other No Yes   Sig: TAKE 1 TABLET BY MOUTH EVERY DAY   Cholecalciferol (VITAMIN D3) 05255 units CAPS  Spouse/Significant Other No Yes   Sig: Take 1 capsule (50,000 Units total) by mouth once a week   Continuous Blood Gluc  (DEXCOM G6 ) LANCE  Spouse/Significant Other No Yes   Sig: Use as directed for continuous glucose monitoring   Continuous Blood Gluc Sensor (DEXCOM G6 SENSOR) MISC  Spouse/Significant Other No Yes   Sig: Use as directed for continuous glucose monitoring   Change every 10 days   Continuous Blood Gluc Transmit (DEXCOM G6 TRANSMITTER) MISC  Spouse/Significant Other No Yes   Sig: Use as directed for continuous glucose monitoring-Change every 3 months   Incontinence Supplies (MALE URINAL) MISC  Spouse/Significant Other No Yes   Sig: by Does not apply route daily   Insulin Syringe-Needle U-100 (B-D INS SYR ULTRAFINE  3CC/30G) 30G X 1/2" 0 3 ML MISC  Spouse/Significant Other No Yes   Sig: by Does not apply route 4 (four) times a day   Insulin Syringe-Needle U-100 (B-D INS SYRINGE 0 5CC/30GX1/2") 30G X 1/2" 0 5 ML MISC  Spouse/Significant Other No Yes   Sig: Inject under the skin 4 (four) times a day   NOVOLOG 100 UNIT/ML injection   No Yes   Sig: INJECT 4 UNITS UNDER THE SKIN 3 TIMES A DAY BEFORE MEALS AND SCALE 150-200 -2 UNITS, 201-250 -4UNITS, 251-300 -6UNITS, 301-350 -8 UNITS, > 350 -99MSFKA   ONETOUCH DELICA LANCETS 19S MISC  Spouse/Significant Other No Yes   Sig: by Does not apply route 3 (three) times a day   amLODIPine (NORVASC) 5 mg tablet   No Yes   Sig: Take 1 tablet (5 mg total) by mouth every 12 (twelve) hours   aspirin (ECOTRIN LOW STRENGTH) 81 mg EC tablet  Spouse/Significant Other Yes Yes   Sig: Take 81 mg by mouth daily Resume on 8/14   atorvastatin (LIPITOR) 80 mg tablet   No Yes   Sig: Take 1 tablet (80 mg total) by mouth daily with dinner   carvedilol (COREG) 6 25 mg tablet  Spouse/Significant Other No Yes   Sig: Take 1 tablet (6 25 mg total) by mouth 2 (two) times a day with meals   escitalopram (LEXAPRO) 10 mg tablet  Spouse/Significant Other No Yes   Sig: Take 1 tablet (10 mg total) by mouth daily at bedtime   famotidine (PEPCID) 20 mg tablet  Spouse/Significant Other Yes Yes   Sig: Take 20 mg by mouth daily Resume on 8/14   gabapentin (NEURONTIN) 250 mg/5 mL solution  Spouse/Significant Other No Yes   Sig: Take 12 mL (600 mg total) by mouth daily at bedtime   glucagon (GLUCAGON EMERGENCY) 1 MG injection  Spouse/Significant Other No Yes   Sig: Inject 1 mg under the skin once as needed for low blood sugar for up to 1 dose   glucose 4 g chewable tablet  Spouse/Significant Other No Yes   Sig: Chew 3 tablets (12 g total) as needed for low blood sugar   glucose blood test strip   No Yes   Sig: Use to test blood sugars 3 times daily   insulin glargine (LANTUS) 100 units/mL subcutaneous injection  Spouse/Significant Other No Yes   Sig: Inject 20 Units under the skin daily   Patient taking differently: Inject 25 Units under the skin daily    mupirocin (BACTROBAN) 2 % cream Not Taking at Unknown time Spouse/Significant Other No No   Sig: Apply topically daily   Patient not taking: Reported on 2/1/2020      Facility-Administered Medications: None       Past Medical History:   Diagnosis Date    Chronic kidney disease     Diabetes mellitus ([facility] [address] )     GERD (gastroesophageal reflux disease)     Hypercholesteremia     Hyperlipidemia     Hypertension     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    Neuropathy     Obesity     Osteomyelitis ([facility] [address] )     last assessed 11/4/16    PVC's (premature ventricular contractions)     sees cardiology Dr uShas camargo    Stroke [facility])     last weeof July 2018 [address]       Past Surgical History:   Procedure Laterality Date    ABDOMINAL SURGERY      CHOLECYSTECTOMY      Percutaneous    COLONOSCOPY      CYSTOSCOPY      OTHER SURGICAL HISTORY      "stimulator to control bowel movements"    SC ESOPHAGOGASTRODUODENOSCOPY TRANSORAL DIAGNOSTIC N/A 9/27/2016    Procedure: ESOPHAGOGASTRODUODENOSCOPY (EGD); Surgeon: Ligia Dupree MD;  [facility]; Service: Gastroenterology    SC LAP,CHOLECYSTECTOMY N/A 2/29/2016    Procedure: LAPAROSCOPIC CHOLECYSTECTOMY ;  Surgeon: Ashley Vargas DO;  [facility];  Service: General    ROTATOR CUFF REPAIR Right     TOE AMPUTATION Right 10/28/2016    Procedure: 3RD TOE AMPUTATION ;  Surgeon: Tonio Yeager DPM;  [facility];  Service:        Family History   Problem Relation Age of Onset    Leukemia Mother     Liver disease Mother     Lung cancer Mother         heavy smoker - 3 ppd    Heart disease Father     Liver disease Father     Multiple myeloma Sister     Breast cancer Sister     Urolithiasis Family     Alcohol abuse Neg Hx     Depression Neg Hx     Drug abuse Neg Hx     Substance Abuse Neg Hx     Mental illness Neg Hx      I have reviewed and agree with the history as documented  Social History     Tobacco Use    Smoking status: Never Smoker    Smokeless tobacco: Never Used   Substance Use Topics    Alcohol use: No    Drug use: No        Review of Systems   Constitutional: Negative for chills, diaphoresis and fever  HENT: Negative for nosebleeds, sore throat and trouble swallowing  Eyes: Negative for blurred vision, photophobia, pain and visual disturbance  Respiratory: Negative for cough, chest tightness and shortness of breath  Cardiovascular: Negative for chest pain, palpitations and leg swelling  Gastrointestinal: Negative for abdominal pain, constipation, diarrhea, nausea and vomiting  Endocrine: Negative for polydipsia and polyuria  Genitourinary: Negative for difficulty urinating, dysuria and hematuria  Musculoskeletal: Negative for back pain, neck pain and neck stiffness  Skin: Negative for pallor and rash  Neurological: Positive for headaches  Negative for dizziness, focal weakness, syncope, weakness, light-headedness, numbness and loss of balance  All other systems reviewed and are negative  Physical Exam  Physical Exam   Constitutional: He is oriented to person, place, and time  He appears well-developed and well-nourished  No distress  HENT:   Head: Normocephalic and atraumatic  Mouth/Throat: Oropharynx is clear and moist and mucous membranes are normal    Eyes: Pupils are equal, round, and reactive to light  EOM are normal    Neck: Normal range of motion  Neck supple  Muscular tenderness (Tenderness to palpation along the left SCM and left trapezius  ) present  No spinous process tenderness present  No neck rigidity  Normal range of motion present  Cardiovascular: Normal rate, regular rhythm, normal heart sounds, intact distal pulses and normal pulses  Pulmonary/Chest: Effort normal and breath sounds normal  No respiratory distress  Abdominal: Soft  He exhibits no distension  There is no tenderness  There is no rigidity, no rebound and no guarding  Musculoskeletal: Normal range of motion  He exhibits no edema or tenderness  Lymphadenopathy:     He has no cervical adenopathy  Neurological: He is alert and oriented to person, place, and time  He has normal strength  No cranial nerve deficit or sensory deficit  Cerebellar exam normal   Speech fluent  Visual fields intact  Skin: Skin is warm and dry  Capillary refill takes less than 2 seconds  Psychiatric: He has a normal mood and affect  Nursing note and vitals reviewed        Vital Signs  ED Triage Vitals   Temperature Pulse Respirations Blood Pressure SpO2   02/01/20 0702 02/01/20 0702 02/01/20 0702 02/01/20 0702 02/01/20 0702   98 5 °F (36 9 °C) 78 20 (!) 191/89 98 %      Temp src Heart Rate Source Patient Position - Orthostatic VS BP Location FiO2 (%)   -- 02/01/20 0730 -- 02/01/20 0730 --    Monitor  Left arm       Pain Score 02/01/20 0702       5           Vitals:    02/01/20 0718 02/01/20 0730 02/01/20 0745 02/01/20 0808   BP: 169/81 169/81 163/75 157/63   Pulse:  74 74 72         Visual Acuity  Visual Acuity      Most Recent Value   L Pupil Size (mm)  2   R Pupil Size (mm)  2          ED Medications  Medications   carvedilol (COREG) tablet 6 25 mg (6 25 mg Oral Given 2/1/20 0718)   lidocaine (LIDODERM) 5 % patch 1 patch (1 patch Topical Medication Applied 2/1/20 0715)   amLODIPine (NORVASC) tablet 5 mg (5 mg Oral Given 2/1/20 0718)   acetaminophen (TYLENOL) tablet 975 mg (975 mg Oral Given 2/1/20 0714)   amLODIPine (NORVASC) tablet 5 mg (5 mg Oral Given 2/1/20 0728)       Diagnostic Studies  Results Reviewed     Procedure Component Value Units Date/Time    Comprehensive metabolic panel [[id number]]  (Abnormal) Collected:  02/01/20 0713    Lab Status:  Final result Specimen:  Blood from Arm, Right Updated:  02/01/20 0736     Sodium 141 mmol/L      Potassium 4 5 mmol/L      Chloride 107 mmol/L      CO2 24 mmol/L      ANION GAP 10 mmol/L      BUN 61 mg/dL      Creatinine 4 24 mg/dL      Glucose 143 mg/dL      Calcium 8 6 mg/dL      AST <5 U/L      ALT 13 U/L      Alkaline Phosphatase 118 U/L      Total Protein 7 0 g/dL      Albumin 3 1 g/dL      Total Bilirubin 0 42 mg/dL      eGFR 14 ml/min/1 73sq m     Narrative:       Romel guidelines for Chronic Kidney Disease (CKD):     Stage 1 with normal or high GFR (GFR > 90 mL/min/1 73 square meters)    Stage 2 Mild CKD (GFR = 60-89 mL/min/1 73 square meters)    Stage 3A Moderate CKD (GFR = 45-59 mL/min/1 73 square meters)    Stage 3B Moderate CKD (GFR = 30-44 mL/min/1 73 square meters)    Stage 4 Severe CKD (GFR = 15-29 mL/min/1 73 square meters)    Stage 5 End Stage CKD (GFR <15 mL/min/1 73 square meters)  Note: GFR calculation is accurate only with a steady state creatinine    CBC and differential [[id number]] Collected:  02/01/20 0713    Lab Status:  Final result Specimen:  Blood from Arm, Right Updated:  02/01/20 0726     WBC 7 86 Thousand/uL      RBC 4 23 Million/uL      Hemoglobin 12 1 g/dL      Hematocrit 38 0 %      MCV 90 fL      MCH 28 6 pg      MCHC 31 8 g/dL      RDW 13 2 %      MPV 10 8 fL      Platelets 564 Thousands/uL      nRBC 0 /100 WBCs      Neutrophils Relative 67 %      Immat GRANS % 0 %      Lymphocytes Relative 23 %      Monocytes Relative 8 %      Eosinophils Relative 2 %      Basophils Relative 0 %      Neutrophils Absolute 5 16 Thousands/µL      Immature Grans Absolute 0 02 Thousand/uL      Lymphocytes Absolute 1 83 Thousands/µL      Monocytes Absolute 0 66 Thousand/µL      Eosinophils Absolute 0 17 Thousand/µL      Basophils Absolute 0 02 Thousands/µL                  CT head without contrast   Final Result by Wilfredo Jennings DO (02/01 0949)   Cerebral atrophy with chronic small vessel ischemic white matter disease  No acute intracranial abnormality  No significant change from priors  Workstation performed: [id number]                    Procedures  Procedures         ED Course  ED Course as of Feb 01 0831   Sat Feb 01, 2020   2348 At baseline  Creatinine(!): 4 24                               MDM  Number of Diagnoses or Management Options  Acute nonintractable headache, unspecified headache type: new and requires workup  Chronic kidney disease, unspecified CKD stage: new and requires workup  Essential hypertension: new and requires workup  Diagnosis management comments: Patient presents with gradual onset of left-sided headache  Patient has tenderness to palpation in the left SCM and left trapezius  His significant other states that he sits on the couch frequently with his neck extended  Suspect musculoskeletal etiology for neck pain resulting in headache  Symptoms significantly improved after lidocaine patch and Tylenol    Patient was also treated with his home antihypertensives with significant improvement in blood pressure  Patient has no evidence of acute CVA  Headache was not acute or maximal in onset  Do not suspect SAH, temporal arteritis, meningitis, encephalitis, CO poisoning, acute angle closure glaucoma, dural venous sinus thrombosis as cause of headache  Do not feel that further imaging or workup (including LP) are warranted at this time  Patient will follow up with PCP and return to ED if symptoms worsen or persist         Amount and/or Complexity of Data Reviewed  Clinical lab tests: ordered and reviewed  Tests in the radiology section of CPT®: ordered and reviewed  Tests in the medicine section of CPT®: reviewed and ordered  Review and summarize past medical records: yes  Independent visualization of images, tracings, or specimens: yes    Risk of Complications, Morbidity, and/or Mortality  Presenting problems: high  Diagnostic procedures: high  Management options: moderate    Patient Progress  Patient progress: improved        Disposition  Final diagnoses:   Acute nonintractable headache, unspecified headache type   Essential hypertension   Chronic kidney disease, unspecified CKD stage     Time reflects when diagnosis was documented in both MDM as applicable and the Disposition within this note     Time User Action Codes Description Comment    2/1/2020  8:03 AM Paradise Kevinmel L Add [R51] Acute nonintractable headache, unspecified headache type     2/1/2020  8:03 AM Paradise Pummel L Add [I10] Essential hypertension     2/1/2020  8:03 AM Kaitlin Pummel L Add [N18 9] Chronic kidney disease, unspecified CKD stage       ED Disposition     ED Disposition Condition Date/Time Comment    Discharge Stable Sat Feb 1, 2020  8:03 AM Salina Rebolledo discharge to home/self care  Follow-up Information     Follow up With Specialties Details [address], DO Internal Medicine Schedule an appointment as soon as possible for a visit  Return to ED sooner if symptoms worsen or persist  [address]  [phone]            Discharge Medication List as of 2/1/2020  8:04 AM      CONTINUE these medications which have NOT CHANGED    Details   amLODIPine (NORVASC) 5 mg tablet Take 1 tablet (5 mg total) by mouth every 12 (twelve) hours, Starting Thu 1/16/2020, Normal      aspirin (ECOTRIN LOW STRENGTH) 81 mg EC tablet Take 81 mg by mouth daily Resume on 8/14, Historical Med      atorvastatin (LIPITOR) 80 mg tablet Take 1 tablet (80 mg total) by mouth daily with dinner, Starting Thu 1/16/2020, Normal      Blood Glucose Monitoring Suppl (TRUE METRIX METER) w/Device KIT Use to test blood sugars 3 times daily, Normal      Blood Pressure Monitoring (BLOOD PRESSURE CUFF) MISC Use to check blood pressure before taking blood pressure medication and 1 hour after and follow instructions provided in discharge instructions based on the readings  , Print      carvedilol (COREG) 6 25 mg tablet Take 1 tablet (6 25 mg total) by mouth 2 (two) times a day with meals, Starting Wed 7/10/2019, Normal      Cholecalciferol (VITAMIN D3) 41938 units CAPS Take 1 capsule (50,000 Units total) by mouth once a week, Starting Fri 10/11/2019, Normal      Continuous Blood Gluc  (DEXCOM G6 ) LANCE Use as directed for continuous glucose monitoring, Normal      Continuous Blood Gluc Sensor (DEXCOM G6 SENSOR) MISC Use as directed for continuous glucose monitoring   Change every 10 days, Normal      Continuous Blood Gluc Transmit (DEXCOM G6 TRANSMITTER) MISC Use as directed for continuous glucose monitoring-Change every 3 months, Normal      [employer]S VITAMIN B-12 1000 MCG tablet TAKE 1 TABLET BY MOUTH EVERY DAY, Normal      escitalopram (LEXAPRO) 10 mg tablet Take 1 tablet (10 mg total) by mouth daily at bedtime, Starting Tue 8/13/2019, Normal      famotidine (PEPCID) 20 mg tablet Take 20 mg by mouth daily Resume on 8/14, Historical Med      gabapentin (NEURONTIN) 250 mg/5 mL solution Take 12 mL (600 mg total) by mouth daily at bedtime, Starting Thu 3/7/2019, Normal      glucagon (GLUCAGON EMERGENCY) 1 MG injection Inject 1 mg under the skin once as needed for low blood sugar for up to 1 dose, Starting Sun 8/25/2019, Print      glucose 4 g chewable tablet Chew 3 tablets (12 g total) as needed for low blood sugar, Starting Wed 8/15/2018, Normal      glucose blood test strip Use to test blood sugars 3 times daily, Normal      Incontinence Supplies (MALE URINAL) MISC by Does not apply route daily, Starting Mon 9/24/2018, Print      insulin glargine (LANTUS) 100 units/mL subcutaneous injection Inject 20 Units under the skin daily, Starting Mon 10/7/2019, No Print      Insulin Syringe-Needle U-100 (B-D INS SYR ULTRAFINE  3CC/30G) 30G X 1/2" 0 3 ML MISC by Does not apply route 4 (four) times a day, Starting Fri 10/26/2018, Normal      Insulin Syringe-Needle U-100 (B-D INS SYRINGE 0 5CC/30GX1/2") 30G X 1/2" 0 5 ML MISC Inject under the skin 4 (four) times a day, Starting Wed 8/14/2019, Normal      NOVOLOG 100 UNIT/ML injection INJECT 4 UNITS UNDER THE SKIN 3 TIMES A DAY BEFORE MEALS AND SCALE 150-200 -2 UNITS, 201-250 -4UNITS, 251-300 -6UNITS, 301-350 -8 UNITS, > 350 -10UNITS, Normal      ONETOUCH DELICA LANCETS 88V MISC by Does not apply route 3 (three) times a day, Starting Tue 11/27/2018, Normal      mupirocin (BACTROBAN) 2 % cream Apply topically daily, Starting Tue 10/1/2019, Normal           No discharge procedures on file      ED Provider  Electronically Signed by           Joyce Muñoz,   02/01/20 2230

## 2020-02-01 NOTE — ED NOTES
Returned from ct, call bell within reach, bed low position, side rails up as appropriate         Rich Krishnamurthy, RN  02/01/20 4020

## 2020-02-03 ENCOUNTER — OFFICE VISIT (OUTPATIENT)
Dept: INTERNAL MEDICINE CLINIC | Facility: CLINIC | Age: 60
End: 2020-02-03
Payer: COMMERCIAL

## 2020-02-03 VITALS
HEIGHT: 70 IN | BODY MASS INDEX: 34.04 KG/M2 | OXYGEN SATURATION: 97 % | DIASTOLIC BLOOD PRESSURE: 78 MMHG | SYSTOLIC BLOOD PRESSURE: 140 MMHG | HEART RATE: 88 BPM | WEIGHT: 237.8 LBS

## 2020-02-03 DIAGNOSIS — S16.1XXA CERVICAL MUSCLE STRAIN, INITIAL ENCOUNTER: ICD-10-CM

## 2020-02-03 DIAGNOSIS — N18.4 BENIGN HYPERTENSION WITH CKD (CHRONIC KIDNEY DISEASE) STAGE IV (HCC): ICD-10-CM

## 2020-02-03 DIAGNOSIS — I10 ESSENTIAL HYPERTENSION: ICD-10-CM

## 2020-02-03 DIAGNOSIS — M25.561 ANTERIOR KNEE PAIN, RIGHT: Primary | ICD-10-CM

## 2020-02-03 DIAGNOSIS — M54.2 BILATERAL POSTERIOR NECK PAIN: ICD-10-CM

## 2020-02-03 DIAGNOSIS — I12.9 BENIGN HYPERTENSION WITH CKD (CHRONIC KIDNEY DISEASE) STAGE IV (HCC): ICD-10-CM

## 2020-02-03 PROCEDURE — 3066F NEPHROPATHY DOC TX: CPT | Performed by: INTERNAL MEDICINE

## 2020-02-03 PROCEDURE — 3077F SYST BP >= 140 MM HG: CPT | Performed by: INTERNAL MEDICINE

## 2020-02-03 PROCEDURE — 3008F BODY MASS INDEX DOCD: CPT | Performed by: INTERNAL MEDICINE

## 2020-02-03 PROCEDURE — 3078F DIAST BP <80 MM HG: CPT | Performed by: INTERNAL MEDICINE

## 2020-02-03 PROCEDURE — 1036F TOBACCO NON-USER: CPT | Performed by: INTERNAL MEDICINE

## 2020-02-03 PROCEDURE — 99215 OFFICE O/P EST HI 40 MIN: CPT | Performed by: INTERNAL MEDICINE

## 2020-02-03 RX ORDER — LIDOCAINE 40 MG/G
CREAM TOPICAL 2 TIMES DAILY
Qty: 45 G | Refills: 0 | Status: SHIPPED | OUTPATIENT
Start: 2020-02-03 | End: 2020-12-27 | Stop reason: HOSPADM

## 2020-02-03 NOTE — PROGRESS NOTES
Assessment/Plan:     Diagnoses and all orders for this visit:    Anterior knee pain, right  Comments:  for 2 days, no previous injury  knee x-ray and tylenol advised  advised to use walker for now as well  Orders:  -     XR knee 3 vw right non injury; Future    Bilateral posterior neck pain  Comments:  related to sleeping sitting up on couch?  x-rays ordered and tylenol BID Ordered  t/c trial of topical lidocaine for muscular pain  Orders:  -     XR spine cervical complete 4 or 5 vw non injury; Future    Benign hypertension with CKD (chronic kidney disease) stage IV ([facility])  Comments:  progressive, sees nephrology outside [facility]  taper off gabapentin(contributing to dizziness) and reduce H2 blocker dose to every other day    Essential hypertension  Comments:  BP elevated but drops with standing(contributing to orthostatic dizziness symptoms?) Patient is hydrating and not volume overloaded  f/u renal(see below)    Cervical muscle strain, initial encounter  -     lidocaine (LMX) 4 % cream; Apply topically 2 (two) times a day As needed to affected area      Defer to pt's nephrologist re: Rudean Lux & stopping rx or lowering dose in setting of progressive renal disease  Etiology of abnl BP findings(elevated but while standing, BP drops to near normal range and pt feels dizzy) unclear  Defer to pt's nephrologist on further management(pt has appt in 2 days with his nephrologist)  advised to use walker at all times for now  Advised if symptoms worsen or new sx develop -> go to ER  Spent 50 mins with patient including >50% of time counseling on symptoms, their treatment, HTN, orthostatic dizziness and drop in BP AND discussed coordination of care(wife called son & patient's nephrologist during today's office visit and case was d/w pt's son & neprhology office by me, including today's visit findings/pt's fluctuating BP issues)  Subjective:      Patient ID: Koby Chun is a 61 y o  male      HPI    Here for follow up, here with wife during today's appt  Went to [address] [place] [address] ER over weekend for dizziness for 1 week, right knee pain for 2 days and posterior neck pain  No fall/injury or radicular sx  In ER, pt had CT head which showed no new changes but BP was elevated  Pt reports compliance with home medications  Dizziness ongoing for 1 week, only with standing and feels lightheaded like he is going to fall  Orthostatics positive in office with symptoms(SBP drop of 20+ points)  Taking amlodipine and coreg BID for HTN and no diuretics  Hx of DM and reports BS doing better  No associated CP, palpitations, nausea  Hx of CVA in past, has walker at home but does not use  Right knee pain ongoing for last 2 days, no knee swelling/fall  Pain with standing/walking but not while sitting  Hx of CKD stage 4+, GFR under 20 ml/min on last BW  Sees non-[facility] nephrology for ongoing care  Urinating well and drinking plenty of water every day  Denies fluid retention or pedal edema  Neck pain presents with ROM of neck  Wife believes pain is from falling asleep on couch every day sitting up as did ER team   X-rays of neck >1 year ago revealed DDD and some arthritis changes  ROS otherwise negative, no other complaints      Past Medical History:   Diagnosis Date    Chronic kidney disease     Diabetes mellitus ([facility] [address] )     GERD (gastroesophageal reflux disease)     Hypercholesteremia     Hyperlipidemia     Hypertension     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    Neuropathy     Obesity     Osteomyelitis ([facility] [address] )     last assessed 11/4/16    PVC's (premature ventricular contractions)     sees cardiology Dr Cassandra camargo    Stroke [facility])     last weeof July 2018 [address] [facility]     Vitals:    02/03/20 1526 02/03/20 1533 02/03/20 1536   BP: 158/86 162/82 140/78   BP Location: Left arm Left arm Right arm   Patient Position: Sitting Supine Standing   Cuff Size: Standard Standard Standard   Pulse: 82 79 88   SpO2: 97% Weight: 108 kg (237 lb 12 8 oz)     Height: 5' 10" (1 778 m)       Body mass index is 34 12 kg/m²  Current Outpatient Medications:     amLODIPine (NORVASC) 5 mg tablet, Take 1 tablet (5 mg total) by mouth every 12 (twelve) hours, Disp: 180 tablet, Rfl: 1    aspirin (ECOTRIN LOW STRENGTH) 81 mg EC tablet, Take 81 mg by mouth daily Resume on 8/14, Disp: , Rfl:     atorvastatin (LIPITOR) 80 mg tablet, Take 1 tablet (80 mg total) by mouth daily with dinner, Disp: 90 tablet, Rfl: 1    Blood Glucose Monitoring Suppl (TRUE METRIX METER) w/Device KIT, Use to test blood sugars 3 times daily, Disp: 1 kit, Rfl: 0    Blood Pressure Monitoring (BLOOD PRESSURE CUFF) MISC, Use to check blood pressure before taking blood pressure medication and 1 hour after and follow instructions provided in discharge instructions based on the readings  , Disp: 1 each, Rfl: 0    carvedilol (COREG) 6 25 mg tablet, Take 1 tablet (6 25 mg total) by mouth 2 (two) times a day with meals, Disp: 180 tablet, Rfl: 1    Cholecalciferol (VITAMIN D3) 36654 units CAPS, Take 1 capsule (50,000 Units total) by mouth once a week, Disp: 5 capsule, Rfl: 5    Continuous Blood Gluc  (DEXCOM G6 ) [facility], Use as directed for continuous glucose monitoring, Disp: 1 Device, Rfl: 0    Continuous Blood Gluc Sensor (DEXCOM G6 SENSOR) MISC, Use as directed for continuous glucose monitoring   Change every 10 days, Disp: 1 each, Rfl: 11    Continuous Blood Gluc Transmit (DEXCOM G6 TRANSMITTER) MISC, Use as directed for continuous glucose monitoring-Change every 3 months, Disp: 1 each, Rfl: 3    [employer] VITAMIN B-12 1000 MCG tablet, TAKE 1 TABLET BY MOUTH EVERY DAY, Disp: 90 tablet, Rfl: 1    escitalopram (LEXAPRO) 10 mg tablet, Take 1 tablet (10 mg total) by mouth daily at bedtime, Disp: 90 tablet, Rfl: 0    famotidine (PEPCID) 20 mg tablet, Take 20 mg by mouth daily Resume on 8/14, Disp: , Rfl:     gabapentin (NEURONTIN) 250 mg/5 mL solution, Take 12 mL (600 mg total) by mouth daily at bedtime, Disp: 470 mL, Rfl: 2    glucagon (GLUCAGON EMERGENCY) 1 MG injection, Inject 1 mg under the skin once as needed for low blood sugar for up to 1 dose, Disp: 1 kit, Rfl: 2    glucose 4 g chewable tablet, Chew 3 tablets (12 g total) as needed for low blood sugar, Disp: 50 tablet, Rfl: 0    glucose blood test strip, Use to test blood sugars 3 times daily, Disp: 300 each, Rfl: 1    Incontinence Supplies (MALE URINAL) MISC, by Does not apply route daily, Disp: 6 each, Rfl: 3    insulin glargine (LANTUS) 100 units/mL subcutaneous injection, Inject 20 Units under the skin daily (Patient taking differently: Inject 25 Units under the skin daily ), Disp: 10 mL, Rfl: 0    Insulin Syringe-Needle U-100 (B-D INS SYR ULTRAFINE  3CC/30G) 30G X 1/2" 0 3 ML MISC, by Does not apply route 4 (four) times a day, Disp: 360 each, Rfl: 1    Insulin Syringe-Needle U-100 (B-D INS SYRINGE 0 5CC/30GX1/2") 30G X 1/2" 0 5 ML MISC, Inject under the skin 4 (four) times a day, Disp: 360 each, Rfl: 1    lidocaine (LMX) 4 % cream, Apply topically 2 (two) times a day As needed to affected area, Disp: 45 g, Rfl: 0    NOVOLOG 100 UNIT/ML injection, INJECT 4 UNITS UNDER THE SKIN 3 TIMES A DAY BEFORE MEALS AND SCALE 150-200 -2 UNITS, 201-250 -4UNITS, 251-300 -6UNITS, 301-350 -8 UNITS, > 350 -10UNITS, Disp: 20 mL, Rfl: 0    ONETOUCH DELICA LANCETS 49X MISC, by Does not apply route 3 (three) times a day, Disp: 270 each, Rfl: 1  No Known Allergies      Review of Systems   Constitutional: Negative for fever  HENT: Negative for congestion  Eyes: Negative for visual disturbance  Respiratory: Negative for shortness of breath  Cardiovascular: Negative for chest pain  Gastrointestinal: Negative for abdominal pain  Endocrine: Negative for polyuria  Genitourinary: Negative for dysuria     Musculoskeletal: Positive for arthralgias, gait problem (s/p CVA and with recent rt knee pain), neck pain and neck stiffness  Negative for joint swelling  Skin: Negative for rash  Allergic/Immunologic: Negative for immunocompromised state  Neurological: Positive for dizziness and light-headedness  Negative for syncope  Psychiatric/Behavioral: Negative for dysphoric mood  Objective:      /78 (BP Location: Right arm, Patient Position: Standing, Cuff Size: Standard)   Pulse 88   Ht 5' 10" (1 778 m)   Wt 108 kg (237 lb 12 8 oz)   SpO2 97%   BMI 34 12 kg/m²          Physical Exam   Constitutional: He appears well-developed and well-nourished  +obese   HENT:   Head: Normocephalic and atraumatic  Eyes: Pupils are equal, round, and reactive to light  Neck: Muscular tenderness present  Decreased range of motion (2nd to posterior neck pain) present  Cardiovascular: Normal rate and regular rhythm  Pulmonary/Chest: Effort normal and breath sounds normal  He has no wheezes  He has no rales  Abdominal: Soft  Bowel sounds are normal  There is no tenderness  Musculoskeletal: He exhibits no edema or tenderness  Right knee: He exhibits no swelling and no deformity  No tenderness found  No crepitus with passive ROM but mild pain with standing and flexion greater than 90 degrees   Neurological: He is alert  Motor 5/5 UE b/l   Psychiatric: His behavior is normal  His mood appears anxious (& concerned)  Vitals reviewed        Lab Results   Component Value Date    SODIUM 141 02/01/2020    K 4 5 02/01/2020     02/01/2020    CO2 24 02/01/2020    BUN 61 (H) 02/01/2020    CREATININE 4 24 (H) 02/01/2020    GLUC 143 (H) 02/01/2020    CALCIUM 8 6 02/01/2020

## 2020-02-03 NOTE — PATIENT INSTRUCTIONS
1  Lower famotidine to 20mg every other day  2  reduce gabapentin from 600mg to 300mg per day for next 4 days, then stop medication  3  Ask Dr Jake Donaldson if you need to taper down or stop lexapro(escitalopram)  4  Right knee x-ray  5  If dizziness worsens or symptoms become urgent, go to Emergency Room  6  Neck x-rays  7  Avoid sleeping while sitting up  8  Tylenol 500mg twice a day for next 3-5 days  9   Appointment with Dr Clara Da Silva

## 2020-02-04 ENCOUNTER — TELEPHONE (OUTPATIENT)
Dept: INTERNAL MEDICINE CLINIC | Facility: CLINIC | Age: 60
End: 2020-02-04

## 2020-02-04 ENCOUNTER — TELEPHONE (OUTPATIENT)
Dept: UROLOGY | Facility: AMBULATORY SURGERY CENTER | Age: 60
End: 2020-02-04

## 2020-02-04 DIAGNOSIS — I10 ESSENTIAL HYPERTENSION: Chronic | ICD-10-CM

## 2020-02-04 RX ORDER — CARVEDILOL 6.25 MG/1
6.25 TABLET ORAL 2 TIMES DAILY WITH MEALS
Qty: 180 TABLET | Refills: 1 | Status: SHIPPED | OUTPATIENT
Start: 2020-02-04 | End: 2020-02-10 | Stop reason: SDUPTHER

## 2020-02-04 NOTE — TELEPHONE ENCOUNTER
[facility] [address] office  [phone]     [address] wants to inform you of the following     1  No need to tamper famotide   2  Can have gabapentin but max dose should be 300 mg a day  3   Does not need to stop lexapro as long as its low dose

## 2020-02-04 NOTE — TELEPHONE ENCOUNTER
Please let Kimberley Schrader know    Famotidine was not tapered, it was lowered to every other day dosing(20mg tablet) based on medication guidelines    Patient is feeling dizzy, that is why gabapentin is being tapered off(stopped)    What is 'low dose' lexapro?   Patient is taking 10mg per day    I'm happy to speak with Dr Severa Curly or their office directly if they would like

## 2020-02-04 NOTE — TELEPHONE ENCOUNTER
Patient's insurance changed to [facility] my [employer] EPO, effective 1/1/20  [facility] and physician group are non-par with insurance  I tried doing an [facility] for an upcoming surgery on 2/18/20, but, it came back stating "provider/facility is not in members network  Member does not have out of network benefits"  I spoke with Asad's wife about this and she was very unhappy because other offices in [place] [address] accepted his insurance card without checking to see if we were par  She will call insurance  For now, the surgery on 2/18/20 has been cancelled  Wife is aware

## 2020-02-07 NOTE — TELEPHONE ENCOUNTER
Rafia Ash at Dr Candace Stanford office to relay information from Dr Manjinder Garcia  There was no response so I left a message asking for a call back

## 2020-02-10 ENCOUNTER — OFFICE VISIT (OUTPATIENT)
Dept: ENDOCRINOLOGY | Facility: CLINIC | Age: 60
End: 2020-02-10
Payer: COMMERCIAL

## 2020-02-10 VITALS
WEIGHT: 239 LBS | SYSTOLIC BLOOD PRESSURE: 140 MMHG | DIASTOLIC BLOOD PRESSURE: 70 MMHG | BODY MASS INDEX: 34.22 KG/M2 | HEIGHT: 70 IN

## 2020-02-10 DIAGNOSIS — N18.4 BENIGN HYPERTENSION WITH CKD (CHRONIC KIDNEY DISEASE) STAGE IV (HCC): ICD-10-CM

## 2020-02-10 DIAGNOSIS — E11.65 TYPE 2 DIABETES MELLITUS WITH HYPERGLYCEMIA, WITH LONG-TERM CURRENT USE OF INSULIN (HCC): Primary | ICD-10-CM

## 2020-02-10 DIAGNOSIS — I12.9 BENIGN HYPERTENSION WITH CKD (CHRONIC KIDNEY DISEASE) STAGE IV (HCC): ICD-10-CM

## 2020-02-10 DIAGNOSIS — E78.2 MIXED HYPERLIPIDEMIA: ICD-10-CM

## 2020-02-10 DIAGNOSIS — Z79.4 TYPE 2 DIABETES MELLITUS WITH HYPERGLYCEMIA, WITH LONG-TERM CURRENT USE OF INSULIN (HCC): Primary | ICD-10-CM

## 2020-02-10 PROCEDURE — 3008F BODY MASS INDEX DOCD: CPT | Performed by: PHYSICIAN ASSISTANT

## 2020-02-10 PROCEDURE — 1036F TOBACCO NON-USER: CPT | Performed by: PHYSICIAN ASSISTANT

## 2020-02-10 PROCEDURE — 99214 OFFICE O/P EST MOD 30 MIN: CPT | Performed by: PHYSICIAN ASSISTANT

## 2020-02-10 PROCEDURE — 3077F SYST BP >= 140 MM HG: CPT | Performed by: PHYSICIAN ASSISTANT

## 2020-02-10 PROCEDURE — 3078F DIAST BP <80 MM HG: CPT | Performed by: PHYSICIAN ASSISTANT

## 2020-02-10 PROCEDURE — 3066F NEPHROPATHY DOC TX: CPT | Performed by: PHYSICIAN ASSISTANT

## 2020-02-10 RX ORDER — INSULIN GLARGINE 100 [IU]/ML
20 INJECTION, SOLUTION SUBCUTANEOUS EVERY MORNING
Start: 2020-02-10 | End: 2020-02-13 | Stop reason: CLARIF

## 2020-02-10 NOTE — PROGRESS NOTES
Patient Progress Note      CC: DM2      Referring Provider  Tahmina Goins, Do  [address] [facility], [address] [place] [address]     History of Present Illness:   Keya Moore is a 61 y o  male with a history of type 2 diabetes with long term use of insulin  Diabetes course has been stable  Complications of DM: CKD, TIA, neuropathy  Denies recent illness or hospitalizations  Denies recent severe hypoglycemic or severe hyperglycemic episodes  Denies any issues with his current regimen  Home glucose monitoring: are performed regularly, 3 times a day    Home blood glucose readings: 106-235 mg/dl, most readings less than 161 mg/dl    Current regimen:  Lantus 20 units daily, NovoLog 4 units 3 times a day with meals plus scale  compliant most of the time, denies any side effects from medications  Injects in: abdomen  Rotates sites: Yes  Hypoglycemic episodes: No, rare  H/o of hypoglycemia causing hospitalization or Intervention such as glucagon injection  or ambulance call :  Yes  Hypoglycemia symptoms: jitteriness and sweating  Treatment of hypoglycemia: soda, juice  Has glucagon pen at home  Medic alert tag: recommended: Yes    Diabetes education: No  Diet: 3 meals per day, 1-2 snacks per day  Timing of meals is predictable  Diabetic diet compliance:  compliant most of the time  Activity: Daily activity is predictable: Yes  No routine exercise   Further diabetic ROS: no polyuria or polydipsia, no chest pain, dyspnea, no numbness, tingling or pain in extremities        Ophthamology:  Eye exam up-to-date, August 2019  Podiatry: goes to Dr Malorie Lau; has appointment on Friday     Has hyperlipidemia: on statin - tolerating well, no myalgias  compliant most of the time, denies any side effects from medications    Thyroid disorders: No  History of pancreatitis: No    Patient Active Problem List   Diagnosis    Type 2 diabetes mellitus with hyperglycemia, with long-term current use of insulin ([facility] [address] )    Mixed hyperlipidemia    Pre-operative cardiovascular examination    Benign hypertension with CKD (chronic kidney disease) stage IV (HCC)    Persistent proteinuria    Cerebrovascular accident (CVA) due to thrombosis of left middle cerebral artery (HCC)    Cognitive impairment    Elevated alkaline phosphatase level    Stage 4 chronic kidney disease (HCC)    Diabetic macular edema (HCC)    GERD (gastroesophageal reflux disease)    Cirrhosis (HCC)    Diabetic polyneuropathy associated with type 2 diabetes mellitus (HCC)    Other constipation    Abnormal EEG    History of stroke    TIA (transient ischemic attack)    Stroke-like symptoms, with right-sided weakness    symptomatic hypoglycemia    Cellulitis    Anxiety associated with depression    Hypertension    Hypothermia    Urge incontinence    Overactive bladder    Vascular dementia ([facility] [address] )      Past Medical History:   Diagnosis Date    Chronic kidney disease     Diabetes mellitus ([facility] [address] )     GERD (gastroesophageal reflux disease)     Hypercholesteremia     Hyperlipidemia     Hypertension     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    Neuropathy     Obesity     Osteomyelitis ([facility] [address] )     last assessed 11/4/16    PVC's (premature ventricular contractions)     sees cardiology Dr Semaj camargo    Stroke [facility])     last weeof July 2018 [address]      Past Surgical History:   Procedure Laterality Date    ABDOMINAL SURGERY      CHOLECYSTECTOMY      Percutaneous    COLONOSCOPY      CYSTOSCOPY      OTHER SURGICAL HISTORY      "stimulator to control bowel movements"    MA ESOPHAGOGASTRODUODENOSCOPY TRANSORAL DIAGNOSTIC N/A 9/27/2016    Procedure: ESOPHAGOGASTRODUODENOSCOPY (EGD); Surgeon: Abhay Hector MD;  [facility];   Service: Gastroenterology    MA LAP,CHOLECYSTECTOMY N/A 2/29/2016    Procedure: LAPAROSCOPIC CHOLECYSTECTOMY ;  Surgeon: Bonnie Ferris DO;  [facility];  Service: General    ROTATOR CUFF REPAIR Right     TOE AMPUTATION Right 10/28/2016    Procedure: 3RD TOE AMPUTATION ;  Surgeon: Lalita Pierce DPM;  [facility];  Service:       Family History   Problem Relation Age of Onset    Leukemia Mother     Liver disease Mother     Lung cancer Mother         heavy smoker - 3 ppd    Heart disease Father     Liver disease Father     Multiple myeloma Sister     Breast cancer Sister     Urolithiasis Family     Alcohol abuse Neg Hx     Depression Neg Hx     Drug abuse Neg Hx     Substance Abuse Neg Hx     Mental illness Neg Hx      Social History     Tobacco Use    Smoking status: Never Smoker    Smokeless tobacco: Never Used   Substance Use Topics    Alcohol use: No     No Known Allergies      Current Outpatient Medications:     amLODIPine (NORVASC) 5 mg tablet, Take 1 tablet (5 mg total) by mouth every 12 (twelve) hours, Disp: 180 tablet, Rfl: 1    aspirin (ECOTRIN LOW STRENGTH) 81 mg EC tablet, Take 81 mg by mouth daily Resume on 8/14, Disp: , Rfl:     atorvastatin (LIPITOR) 80 mg tablet, Take 1 tablet (80 mg total) by mouth daily with dinner, Disp: 90 tablet, Rfl: 1    carvedilol (COREG) 6 25 mg tablet, Take 1 tablet (6 25 mg total) by mouth 2 (two) times a day with meals, Disp: 180 tablet, Rfl: 1    Cholecalciferol (VITAMIN D3) 16143 units CAPS, Take 1 capsule (50,000 Units total) by mouth once a week, Disp: 5 capsule, Rfl: 5    Continuous Blood Gluc  (DEXCOM G6 ) [facility], Use as directed for continuous glucose monitoring, Disp: 1 Device, Rfl: 0    Continuous Blood Gluc Sensor (DEXCOM G6 SENSOR) MISC, Use as directed for continuous glucose monitoring   Change every 10 days, Disp: 1 each, Rfl: 11    Continuous Blood Gluc Transmit (DEXCOM G6 TRANSMITTER) MISC, Use as directed for continuous glucose monitoring-Change every 3 months, Disp: 1 each, Rfl: 3    CVS VITAMIN B-12 1000 MCG tablet, TAKE 1 TABLET BY MOUTH EVERY DAY, Disp: 90 tablet, Rfl: 1   escitalopram (LEXAPRO) 10 mg tablet, Take 1 tablet (10 mg total) by mouth daily at bedtime, Disp: 90 tablet, Rfl: 0    famotidine (PEPCID) 20 mg tablet, Take 20 mg by mouth daily Resume on 8/14, Disp: , Rfl:     glucagon (GLUCAGON EMERGENCY) 1 MG injection, Inject 1 mg under the skin once as needed for low blood sugar for up to 1 dose, Disp: 1 kit, Rfl: 2    glucose 4 g chewable tablet, Chew 3 tablets (12 g total) as needed for low blood sugar, Disp: 50 tablet, Rfl: 0    glucose blood test strip, Use to test blood sugars 3 times daily, Disp: 300 each, Rfl: 1    Incontinence Supplies (MALE URINAL) MISC, by Does not apply route daily, Disp: 6 each, Rfl: 3    insulin aspart (NOVOLOG) 100 units/mL injection, Inject 4 units 3 times a day with meals plus scale  Use up to 25 units daily  , Disp: 20 mL, Rfl: 0    insulin glargine (LANTUS) 100 units/mL subcutaneous injection, Inject 20 Units under the skin every morning, Disp: , Rfl:     Insulin Syringe-Needle U-100 (B-D INS SYRINGE 0 5CC/30GX1/2") 30G X 1/2" 0 5 ML MISC, Inject under the skin 4 (four) times a day, Disp: 360 each, Rfl: 1    ONETOUCH DELICA LANCETS 19O MISC, by Does not apply route 3 (three) times a day, Disp: 270 each, Rfl: 1    Blood Glucose Monitoring Suppl (TRUE METRIX METER) w/Device KIT, Use to test blood sugars 3 times daily, Disp: 1 kit, Rfl: 0    Blood Pressure Monitoring (BLOOD PRESSURE CUFF) MISC, Use to check blood pressure before taking blood pressure medication and 1 hour after and follow instructions provided in discharge instructions based on the readings  , Disp: 1 each, Rfl: 0    gabapentin (NEURONTIN) 250 mg/5 mL solution, Take 12 mL (600 mg total) by mouth daily at bedtime (Patient not taking: Reported on 2/10/2020), Disp: 470 mL, Rfl: 2    lidocaine (LMX) 4 % cream, Apply topically 2 (two) times a day As needed to affected area (Patient not taking: Reported on 2/10/2020), Disp: 45 g, Rfl: 0  Review of Systems   Constitutional: Positive for fatigue  Negative for activity change, appetite change and unexpected weight change  HENT: Negative for trouble swallowing  Eyes: Negative for visual disturbance  Respiratory: Negative for shortness of breath  Cardiovascular: Negative for chest pain and palpitations  Gastrointestinal: Negative for constipation and diarrhea  Endocrine: Negative for polydipsia and polyuria  Musculoskeletal: Positive for neck pain (has seen PCP for this recently)  Skin: Negative for wound  Neurological: Positive for numbness  Psychiatric/Behavioral: Negative  Physical Exam:  Body mass index is 34 29 kg/m²  /70   Ht 5' 10" (1 778 m)   Wt 108 kg (239 lb)   BMI 34 29 kg/m²    Wt Readings from Last 3 Encounters:   02/10/20 108 kg (239 lb)   02/03/20 108 kg (237 lb 12 8 oz)   02/01/20 109 kg (240 lb 4 8 oz)       Physical Exam   Constitutional: He appears well-developed and well-nourished  HENT:   Head: Normocephalic  Eyes: Pupils are equal, round, and reactive to light  EOM are normal  No scleral icterus  Neck: Neck supple  No thyromegaly present  Cardiovascular: Normal rate and regular rhythm  No murmur heard  Pulses:       Radial pulses are 2+ on the right side, and 2+ on the left side  Pulmonary/Chest: Effort normal and breath sounds normal  No respiratory distress  He has no wheezes  Neurological: He is alert  He has normal reflexes  Skin: Skin is warm and dry  Psychiatric: He has a normal mood and affect  Nursing note and vitals reviewed  Patient's shoes and socks were not removed        Labs:   Component      Latest Ref Rng & Units 10/12/2019 1/11/2020   Sodium      136 - 145 mmol/L 140 139   Potassium      3 5 - 5 3 mmol/L 4 6 5 1   Chloride      100 - 108 mmol/L 105 105   CO2      21 - 32 mmol/L 26 26   Anion Gap      4 - 13 mmol/L 9 8   BUN      5 - 25 mg/dL 47 (H) 54 (H)   Creatinine      0 60 - 1 30 mg/dL 3 30 (H) 4 31 (H)   GLUCOSE FASTING      65 - 99 mg/dL 195 (H) 134 (H)   Calcium      8 3 - 10 1 mg/dL 8 9 8 9   eGFR      ml/min/1 73sq m 19 14   Cholesterol      50 - 200 mg/dL  80   Triglycerides      <=150 mg/dL  143   HDL      >=40 mg/dL  31 (L)   LDL Direct      0 - 100 mg/dL  20   Non-HDL Cholesterol      mg/dl  49   Hemoglobin A1C      4 2 - 6 3 % 7 3 (H) 7 7 (H)   EAG      mg/dl 163 174       Plan:    Diagnoses and all orders for this visit:    Type 2 diabetes mellitus with hyperglycemia, with long-term current use of insulin ([facility])  HGA1C 7 7%  Treatment regimen: BG levels are mostly in acceptable range  Continue current treatment  Discussed intensive insulin regimen does increase risk for hypoglycemia  Episodes of hypoglycemia can lead to permanent disability and death  Discussed risks/complications associated with uncontrolled diabetes  Advised to adhere to diabetic diet  Keep carbohydrates consistent to limit blood glucose fluctuations  Advised to call if blood sugars less than 70 mg/dl or over 300 mg/dl  Check blood glucose 4 times a day  Discussed symptoms and treatment of hypoglycemia  Discussed use of CGM to collect additional blood glucose data to reveal trends and patterns that can be used to optimize treatment plan  Has Dexcom, but not using it  Referred for training  Referred to diabetes/nutrition education  Recommended routine follow-up with podiatry and ophthalmology  Ordered blood work to complete prior to next visit  -     Hemoglobin A1C; Future  -     Basic metabolic panel; Future  -     insulin glargine (LANTUS) 100 units/mL subcutaneous injection; Inject 20 Units under the skin every morning  -     insulin aspart (NOVOLOG) 100 units/mL injection; Inject 4 units 3 times a day with meals plus scale  Use up to 25 units daily  -     Ambulatory referral to Diabetic Education;  Future    Benign hypertension with CKD (chronic kidney disease) stage IV ([facility])  Blood pressure adequately controlled  Creatinine 4 31, GFR 14  Has been referred for AVF placement  Managed by nephrology   -     Basic metabolic panel; Future    Mixed hyperlipidemia   LDL 20  Continue statin therapy  Managed by PCP      Discussed with the patient diagnosis and treatment and all questions fully answered  He will call me if any problems arise  Counseled patient on diagnostic results, prognosis, risk and benefit of treatment options, instruction for management, importance of treatment compliance, risk factor reduction and impressions        Lissette Brennan PA-C

## 2020-02-10 NOTE — PATIENT INSTRUCTIONS
INSULIN DOSAGE INSTRUCTIONS    Name: Kellie Herrera                        : 1960  MRN #: [id number]    Your Current Insulin  and dose is: Before Breakfast Before Lunch Before Evening Meal Bedtime     Novolog Insulin   4 units     4 units   4 units    Regular, Apidra, Humalog orNovolog Sliding Scale:   <80              151-200 +1 +1 +1    201-250 +2 +2 +2 +   251-300 +3 +3 +3 +   301-350 +4 +4 +4 +   >350 +5 +5 +5 +       Lantus Insulin    20 units     Additional Instructions:   Please test your blood sugar:  _4_ Times per day  X_ Before Breakfast                _ Alternate Testing  X_ Before Lunch                _ 2 Hours After  Meal  X_ Before Evening Meal               _ 3 a m   x_ Before Bedtime Snack     Target Blood sugar range _80_to _180__  Call if your  blood sugar is less than _70_ or greater than _400__  Today's Date: 2/10/2020      Hypoglycemia instructions   Kellie Herrera  2/10/2020  [id number]    Low Blood Sugar    Steps to treat low blood sugar  1  Test blood sugar if you have symptoms of low blood sugar:   Low Blood Sugar Symptoms:  o Sweaty  o Dizzy  o Rapid heartbeat  o Shaky  o Bad mood  o Hungry      2  Treat blood sugar less than 70 with 15 grams of fast-acting carbohydrate:   Examples of 15 grams Fast-Acting Carbohydrate:  o 4 oz juice  o 4 oz regular soda  o 3-4 glucose tablets (chew)  o 3-4 hard candies (chew)          3  Wait 15 minutes and test your blood sugar again     4   If blood sugar is less than 100, repeat steps 2-3     5  When your blood sugar is 100 or more, eat a snack if it will be longer than one hour until your next meal  The snack should be 15 grams of carbohydrate and a protein:   Examples of snacks:  o ½ sandwich  o 6 crackers with cheese  o Piece of fruit with cheese or peanut butter  o 6 crackers with peanut butter

## 2020-02-10 NOTE — TELEPHONE ENCOUNTER
Reji Jones called back and left a message saying that Dr Ashwin Naqvi said that the lexapro at 10 mg daily is fine with him

## 2020-02-12 ENCOUNTER — TELEPHONE (OUTPATIENT)
Dept: UROLOGY | Facility: MEDICAL CENTER | Age: 60
End: 2020-02-12

## 2020-02-12 NOTE — TELEPHONE ENCOUNTER
Patient of Dr Yue Gibson seen in [facility] office  Patient son calling to get procedure code for Botox injection

## 2020-02-13 DIAGNOSIS — Z79.4 TYPE 2 DIABETES MELLITUS WITH HYPERGLYCEMIA, WITH LONG-TERM CURRENT USE OF INSULIN (HCC): Primary | ICD-10-CM

## 2020-02-13 DIAGNOSIS — E11.65 TYPE 2 DIABETES MELLITUS WITH HYPERGLYCEMIA, WITH LONG-TERM CURRENT USE OF INSULIN (HCC): Primary | ICD-10-CM

## 2020-02-13 NOTE — TELEPHONE ENCOUNTER
Wife called lantus no longer covered preferred is basaglar refill to [facility]

## 2020-02-18 ENCOUNTER — OFFICE VISIT (OUTPATIENT)
Dept: PODIATRY | Facility: CLINIC | Age: 60
End: 2020-02-18
Payer: COMMERCIAL

## 2020-02-18 VITALS
BODY MASS INDEX: 34.47 KG/M2 | HEART RATE: 68 BPM | DIASTOLIC BLOOD PRESSURE: 80 MMHG | HEIGHT: 70 IN | WEIGHT: 240.8 LBS | SYSTOLIC BLOOD PRESSURE: 141 MMHG

## 2020-02-18 DIAGNOSIS — B35.1 ONYCHOMYCOSIS: ICD-10-CM

## 2020-02-18 DIAGNOSIS — E11.42 DIABETIC POLYNEUROPATHY ASSOCIATED WITH TYPE 2 DIABETES MELLITUS (HCC): Primary | ICD-10-CM

## 2020-02-18 DIAGNOSIS — Z89.421 ABSENCE OF TOE OF RIGHT FOOT (HCC): ICD-10-CM

## 2020-02-18 PROCEDURE — 3008F BODY MASS INDEX DOCD: CPT | Performed by: PODIATRIST

## 2020-02-18 PROCEDURE — 3077F SYST BP >= 140 MM HG: CPT | Performed by: PODIATRIST

## 2020-02-18 PROCEDURE — 99213 OFFICE O/P EST LOW 20 MIN: CPT | Performed by: PODIATRIST

## 2020-02-18 PROCEDURE — 1036F TOBACCO NON-USER: CPT | Performed by: PODIATRIST

## 2020-02-18 PROCEDURE — 3079F DIAST BP 80-89 MM HG: CPT | Performed by: PODIATRIST

## 2020-02-18 PROCEDURE — 3051F HG A1C>EQUAL 7.0%<8.0%: CPT | Performed by: PODIATRIST

## 2020-02-18 PROCEDURE — 3066F NEPHROPATHY DOC TX: CPT | Performed by: PODIATRIST

## 2020-02-18 PROCEDURE — 11721 DEBRIDE NAIL 6 OR MORE: CPT | Performed by: PODIATRIST

## 2020-02-18 NOTE — LETTER
February 18, 2020     Raj Auguste DO  [address] [address]    Patient: Patt Gottlieb   YOB: 1960   Date of Visit: 2/18/2020       Dear Dr Becca Ly:    Thank you for referring Ramonalilia Nina to me for evaluation  Below are my notes for this consultation  If you have questions, please do not hesitate to call me  I look forward to following your patient along with you  Sincerely,        Carl Cameron DPM        CC: MD Carl Baldwin DPM  2/18/2020  6:01 PM  Sign at close encounter        PATIENT:  Patt Gottlieb    1960    ASSESSMENT:     1  Diabetic polyneuropathy associated with type 2 diabetes mellitus ([facility] [address] )     2  Absence of toe of right foot ([facility] [address] )     3  Onychomycosis  Debridement         PLAN:  Patient was counseled on the condition and diagnosis  Educated disease prevention and risks related to diabetes  Educated proper daily foot care and exam   Instructed proper skin care / protection and footwear  Instructed to identify any signs of infection and related foot problem  The recent blood work was reviewed and the last HbA1c was 7 7  Discussed proper blood glucose control with diet and exercise  The patient will return in 9 weeks for periodic diabetic foot exam and care  Procedure: All mycotic toenails were reduced and debrided in length, width, and girth using a nail nipper and dremel  Patient tolerated procedure(s) well without complications     Subjective:        HPI: The patients presents with his wife for diabetic foot evaluation  The blood glucose has been better  His kidney function has been worse since the last visit and he might start dialysis in the next few months  He has high risk diabetic feet with history of foot ulcer and partial toe amputation  He has numbness in his feet  Denied weakness or significant functional deficit  The patient presents with diabetic shoes        The following portions of the patient's history were reviewed and updated as appropriate: allergies, current medications, past family history, past medical history, past social history, past surgical history and problem list   All pertinent labs and images were reviewed  Past Medical History  Past Medical History:   Diagnosis Date    Chronic kidney disease     [facility]     GERD (gastroesophageal reflux disease)     Hypercholesteremia     Hyperlipidemia     Hypertension     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    Neuropathy     Obesity     Osteomyelitis ([facility] [address] )     last assessed 11/4/16    PVC's (premature ventricular contractions)     sees cardiology Dr Gabby camargo    Stroke [facility])     last weeof July 2018 [address] [facility]       Past Surgical History  Past Surgical History:   Procedure Laterality Date    ABDOMINAL SURGERY      CHOLECYSTECTOMY      Percutaneous    COLONOSCOPY      CYSTOSCOPY      OTHER SURGICAL HISTORY      "stimulator to control bowel movements"    SC ESOPHAGOGASTRODUODENOSCOPY TRANSORAL DIAGNOSTIC N/A 9/27/2016    Procedure: ESOPHAGOGASTRODUODENOSCOPY (EGD); Surgeon: Latanya Mai MD;  [facility]; Service: Gastroenterology    SC LAP,CHOLECYSTECTOMY N/A 2/29/2016    Procedure: LAPAROSCOPIC CHOLECYSTECTOMY ;  Surgeon: Aditi Moura DO;  [facility];  Service: General    ROTATOR CUFF REPAIR Right     TOE AMPUTATION Right 10/28/2016    Procedure: 3RD TOE AMPUTATION ;  Surgeon: Oswald Smith DPM;  [facility];  Service:         Allergies:  Patient has no known allergies      Medications:  Current Outpatient Medications   Medication Sig Dispense Refill    amLODIPine (NORVASC) 5 mg tablet Take 1 tablet (5 mg total) by mouth every 12 (twelve) hours 180 tablet 1    aspirin (ECOTRIN LOW STRENGTH) 81 mg EC tablet Take 81 mg by mouth daily Resume on 8/14      atorvastatin (LIPITOR) 80 mg tablet Take 1 tablet (80 mg total) by mouth daily with dinner 90 tablet 1    Blood Glucose Monitoring Suppl (TRUE METRIX METER) w/Device KIT Use to test blood sugars 3 times daily 1 kit 0    Blood Pressure Monitoring (BLOOD PRESSURE CUFF) MISC Use to check blood pressure before taking blood pressure medication and 1 hour after and follow instructions provided in discharge instructions based on the readings  1 each 0    carvedilol (COREG) 6 25 mg tablet Take 1 tablet (6 25 mg total) by mouth 2 (two) times a day with meals 180 tablet 1    Cholecalciferol (VITAMIN D3) 59338 units CAPS Take 1 capsule (50,000 Units total) by mouth once a week 5 capsule 5    Continuous Blood Gluc  (DEXCOM G6 ) LANCE Use as directed for continuous glucose monitoring 1 Device 0    Continuous Blood Gluc Sensor (DEXCOM G6 SENSOR) MISC Use as directed for continuous glucose monitoring  Change every 10 days 1 each 11    Continuous Blood Gluc Transmit (DEXCOM G6 TRANSMITTER) MISC Use as directed for continuous glucose monitoring-Change every 3 months 1 each 3    CVS VITAMIN B-12 1000 MCG tablet TAKE 1 TABLET BY MOUTH EVERY DAY 90 tablet 1    escitalopram (LEXAPRO) 10 mg tablet Take 1 tablet (10 mg total) by mouth daily at bedtime 90 tablet 0    famotidine (PEPCID) 20 mg tablet Take 20 mg by mouth daily Resume on 8/14      gabapentin (NEURONTIN) 250 mg/5 mL solution Take 12 mL (600 mg total) by mouth daily at bedtime 470 mL 2    glucagon (GLUCAGON EMERGENCY) 1 MG injection Inject 1 mg under the skin once as needed for low blood sugar for up to 1 dose 1 kit 2    glucose 4 g chewable tablet Chew 3 tablets (12 g total) as needed for low blood sugar 50 tablet 0    glucose blood test strip Use to test blood sugars 3 times daily 300 each 1    Incontinence Supplies (MALE URINAL) MISC by Does not apply route daily 6 each 3    insulin aspart (NOVOLOG) 100 units/mL injection Inject 4 units 3 times a day with meals plus scale  Use up to 25 units daily   20 mL 0    insulin glargine (BASAGLAR KWIKPEN) 100 units/mL injection pen Inject 20 Units under the skin daily 5 pen 3    Insulin Syringe-Needle U-100 (B-D INS SYRINGE 0 5CC/30GX1/2") 30G X 1/2" 0 5 ML MISC Inject under the skin 4 (four) times a day 360 each 1    lidocaine (LMX) 4 % cream Apply topically 2 (two) times a day As needed to affected area 45 g 0    ONETOUCH DELICA LANCETS 99P MISC by Does not apply route 3 (three) times a day 270 each 1     No current facility-administered medications for this visit  Social History:  Social History     Socioeconomic History    Marital status: /Civil Union     Spouse name: None    Number of children: None    Years of education: None    Highest education level: None   Occupational History    Occupation: disabled   Social Needs    Financial resource strain: None    Food insecurity:     Worry: None     Inability: None    Transportation needs:     Medical: None     Non-medical: None   Tobacco Use    Smoking status: Never Smoker    Smokeless tobacco: Never Used   Substance and Sexual Activity    Alcohol use: No    Drug use: No    Sexual activity: Not Currently   Lifestyle    Physical activity:     Days per week: None     Minutes per session: None    Stress: None   Relationships    Social connections:     Talks on phone: None     Gets together: None     Attends [employer] service: None     Active member of club or organization: None     Attends meetings of clubs or organizations: None     Relationship status: None    Intimate partner violence:     Fear of current or ex partner: None     Emotionally abused: None     Physically abused: None     Forced sexual activity: None   Other Topics Concern    None   Social History Narrative    Daily caffeine consumption 2-3 servings a day        Review of Systems   Constitutional: Negative for chills and fever  Respiratory: Negative for cough and shortness of breath  Cardiovascular: Positive for leg swelling  Negative for chest pain  Gastrointestinal: Negative for constipation, diarrhea, nausea and vomiting  Skin: Negative for wound  Neurological: Positive for numbness  Psychiatric/Behavioral: Negative for confusion  Objective:      /80   Pulse 68   Ht 5' 10" (1 778 m) Comment: verbal  Wt 109 kg (240 lb 12 8 oz)   BMI 34 55 kg/m²           Physical Exam   Constitutional: He is oriented to person, place, and time  He appears well-developed and well-nourished  No distress  Cardiovascular: Normal rate and regular rhythm  Pulses are no weak pulses  Pulses:       Dorsalis pedis pulses are 0 on the right side, and 0 on the left side  Posterior tibial pulses are 1+ on the right side, and 1+ on the left side  Mild, diffuse ankle edema bilaterally  Pulmonary/Chest: Effort normal and breath sounds normal  No respiratory distress  Musculoskeletal: He exhibits no edema or tenderness  No acute joint pain or edema  No redness  No ecchymosis  Hammertoe deformity noted  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  Feet:   Right Foot: amputated  Skin Integrity: Positive for dry skin  Negative for ulcer, skin breakdown, erythema, warmth or callus  Left Foot:   Skin Integrity: Positive for dry skin  Negative for ulcer, skin breakdown, erythema, warmth or callus  Neurological: He is alert and oriented to person, place, and time  A sensory deficit is present  Skin: Capillary refill takes less than 2 seconds  No erythema  No pallor  No ulcer in his feet  No redness or cellulitis  Psychiatric: His behavior is normal    Vitals reviewed  Diabetic Foot Exam    Patient's shoes and socks removed  Right Foot/Ankle   Right Foot Inspection  Skin Exam: skin normal, skin intact and dry skin no warmth, no callus, no erythema, no maceration, no abnormal color, no pre-ulcer, no ulcer and no callus                        Amputation: amputation right foot (Comments: partial amputation right 3rd toe)  Toe Exam: right toe deformityno swelling, no tenderness and erythema  Sensory   Vibration: absent  Proprioception: intact   Monofilament testing: absent  Vascular  Capillary refills: < 3 seconds  The right DP pulse is 0  The right PT pulse is 1+  Left Foot/Ankle  Left Foot Inspection  Skin Exam: skin normal, skin intact and dry skinno warmth, no erythema, no maceration, normal color, no pre-ulcer, no ulcer and no callus                         Toe Exam: left toe deformityno swelling, no tenderness and no erythema                   Sensory   Vibration: absent  Proprioception: intact  Monofilament: absent  Vascular  Capillary refills: < 3 seconds  The left DP pulse is 0  The left PT pulse is 1+  Assign Risk Category:  Deformity present; Loss of protective sensation;  No weak pulses       Risk: 2

## 2020-02-18 NOTE — TELEPHONE ENCOUNTER
Patient's son called in regard to questions on surgery  Reported he has not heard form anyone at this point  Needed code for procedure for insurance and information regarding procedure    Please call at [phone] to discuss   Thank you

## 2020-02-18 NOTE — PROGRESS NOTES
PATIENT:  Liliya Neil    1960    ASSESSMENT:     1  Diabetic polyneuropathy associated with type 2 diabetes mellitus ([facility] [address] )     2  Absence of toe of right foot ([facility] [address] )     3  Onychomycosis  Debridement         PLAN:  Patient was counseled on the condition and diagnosis  Educated disease prevention and risks related to diabetes  Educated proper daily foot care and exam   Instructed proper skin care / protection and footwear  Instructed to identify any signs of infection and related foot problem  The recent blood work was reviewed and the last HbA1c was 7 7  Discussed proper blood glucose control with diet and exercise  The patient will return in 9 weeks for periodic diabetic foot exam and care  Procedure: All mycotic toenails were reduced and debrided in length, width, and girth using a nail nipper and dremel  Patient tolerated procedure(s) well without complications     Subjective:        HPI: The patients presents with his wife for diabetic foot evaluation  The blood glucose has been better  His kidney function has been worse since the last visit and he might start dialysis in the next few months  He has high risk diabetic feet with history of foot ulcer and partial toe amputation  He has numbness in his feet  Denied weakness or significant functional deficit  The patient presents with diabetic shoes  The following portions of the patient's history were reviewed and updated as appropriate: allergies, current medications, past family history, past medical history, past social history, past surgical history and problem list   All pertinent labs and images were reviewed      Past Medical History  Past Medical History:   Diagnosis Date    Chronic kidney disease     Diabetes mellitus (HCC)     GERD (gastroesophageal reflux disease)     Hypercholesteremia     Hyperlipidemia     Hypertension     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    Neuropathy     Obesity     Osteomyelitis ([address] )     last assessed 11/4/16    PVC's (premature ventricular contractions)     sees cardiology Dr Keith camargo    Stroke [facility])     last weeof July 2018 [address] [facility]       Past Surgical History  Past Surgical History:   Procedure Laterality Date    ABDOMINAL SURGERY      CHOLECYSTECTOMY      Percutaneous    COLONOSCOPY      CYSTOSCOPY      OTHER SURGICAL HISTORY      "stimulator to control bowel movements"    MO ESOPHAGOGASTRODUODENOSCOPY TRANSORAL DIAGNOSTIC N/A 9/27/2016    Procedure: ESOPHAGOGASTRODUODENOSCOPY (EGD); Surgeon: Sanjeev Naqvi MD;  [facility]; Service: Gastroenterology    MO LAP,CHOLECYSTECTOMY N/A 2/29/2016    Procedure: LAPAROSCOPIC CHOLECYSTECTOMY ;  Surgeon: Austen Carl DO;  [facility];  Service: General    ROTATOR CUFF REPAIR Right     TOE AMPUTATION Right 10/28/2016    Procedure: 3RD TOE AMPUTATION ;  Surgeon: Jesse Ramirez DPM;  [facility];  Service:         Allergies:  Patient has no known allergies  Medications:  Current Outpatient Medications   Medication Sig Dispense Refill    amLODIPine (NORVASC) 5 mg tablet Take 1 tablet (5 mg total) by mouth every 12 (twelve) hours 180 tablet 1    aspirin (ECOTRIN LOW STRENGTH) 81 mg EC tablet Take 81 mg by mouth daily Resume on 8/14      atorvastatin (LIPITOR) 80 mg tablet Take 1 tablet (80 mg total) by mouth daily with dinner 90 tablet 1    Blood Glucose Monitoring Suppl (TRUE METRIX METER) w/Device KIT Use to test blood sugars 3 times daily 1 kit 0    Blood Pressure Monitoring (BLOOD PRESSURE CUFF) MISC Use to check blood pressure before taking blood pressure medication and 1 hour after and follow instructions provided in discharge instructions based on the readings   1 each 0    carvedilol (COREG) 6 25 mg tablet Take 1 tablet (6 25 mg total) by mouth 2 (two) times a day with meals 180 tablet 1    Cholecalciferol (VITAMIN D3) 18464 units CAPS Take 1 capsule (50,000 Units total) by mouth once a week 5 capsule 5    Continuous Blood Gluc  (DEXCOM G6 ) LANCE Use as directed for continuous glucose monitoring 1 Device 0    Continuous Blood Gluc Sensor (DEXCOM G6 SENSOR) MISC Use as directed for continuous glucose monitoring  Change every 10 days 1 each 11    Continuous Blood Gluc Transmit (DEXCOM G6 TRANSMITTER) MISC Use as directed for continuous glucose monitoring-Change every 3 months 1 each 3    CVS VITAMIN B-12 1000 MCG tablet TAKE 1 TABLET BY MOUTH EVERY DAY 90 tablet 1    escitalopram (LEXAPRO) 10 mg tablet Take 1 tablet (10 mg total) by mouth daily at bedtime 90 tablet 0    famotidine (PEPCID) 20 mg tablet Take 20 mg by mouth daily Resume on 8/14      gabapentin (NEURONTIN) 250 mg/5 mL solution Take 12 mL (600 mg total) by mouth daily at bedtime 470 mL 2    glucagon (GLUCAGON EMERGENCY) 1 MG injection Inject 1 mg under the skin once as needed for low blood sugar for up to 1 dose 1 kit 2    glucose 4 g chewable tablet Chew 3 tablets (12 g total) as needed for low blood sugar 50 tablet 0    glucose blood test strip Use to test blood sugars 3 times daily 300 each 1    Incontinence Supplies (MALE URINAL) MISC by Does not apply route daily 6 each 3    insulin aspart (NOVOLOG) 100 units/mL injection Inject 4 units 3 times a day with meals plus scale  Use up to 25 units daily  20 mL 0    insulin glargine (BASAGLAR KWIKPEN) 100 units/mL injection pen Inject 20 Units under the skin daily 5 pen 3    Insulin Syringe-Needle U-100 (B-D INS SYRINGE 0 5CC/30GX1/2") 30G X 1/2" 0 5 ML MISC Inject under the skin 4 (four) times a day 360 each 1    lidocaine (LMX) 4 % cream Apply topically 2 (two) times a day As needed to affected area 45 g 0    ONETOUCH DELICA LANCETS 50C MISC by Does not apply route 3 (three) times a day 270 each 1     No current facility-administered medications for this visit          Social History:  Social History Socioeconomic History    Marital status: /Civil Union     Spouse name: None    Number of children: None    Years of education: None    Highest education level: None   Occupational History    Occupation: disabled   Social Needs    Financial resource strain: None    Food insecurity:     Worry: None     Inability: None    Transportation needs:     Medical: None     Non-medical: None   Tobacco Use    Smoking status: Never Smoker    Smokeless tobacco: Never Used   Substance and Sexual Activity    Alcohol use: No    Drug use: No    Sexual activity: Not Currently   Lifestyle    Physical activity:     Days per week: None     Minutes per session: None    Stress: None   Relationships    Social connections:     Talks on phone: None     Gets together: None     Attends Taoism service: None     Active member of club or organization: None     Attends meetings of clubs or organizations: None     Relationship status: None    Intimate partner violence:     Fear of current or ex partner: None     Emotionally abused: None     Physically abused: None     Forced sexual activity: None   Other Topics Concern    None   Social History Narrative    Daily caffeine consumption 2-3 servings a day        Review of Systems   Constitutional: Negative for chills and fever  Respiratory: Negative for cough and shortness of breath  Cardiovascular: Positive for leg swelling  Negative for chest pain  Gastrointestinal: Negative for constipation, diarrhea, nausea and vomiting  Skin: Negative for wound  Neurological: Positive for numbness  Psychiatric/Behavioral: Negative for confusion  Objective:      /80   Pulse 68   Ht 5' 10" (1 778 m) Comment: verbal  Wt 109 kg (240 lb 12 8 oz)   BMI 34 55 kg/m²          Physical Exam   Constitutional: He is oriented to person, place, and time  He appears well-developed and well-nourished  No distress  Cardiovascular: Normal rate and regular rhythm   Pulses are no weak pulses  Pulses:       Dorsalis pedis pulses are 0 on the right side, and 0 on the left side  Posterior tibial pulses are 1+ on the right side, and 1+ on the left side  Mild, diffuse ankle edema bilaterally  Pulmonary/Chest: Effort normal and breath sounds normal  No respiratory distress  Musculoskeletal: He exhibits no edema or tenderness  No acute joint pain or edema  No redness  No ecchymosis  Hammertoe deformity noted  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  Feet:   Right Foot: amputated  Skin Integrity: Positive for dry skin  Negative for ulcer, skin breakdown, erythema, warmth or callus  Left Foot:   Skin Integrity: Positive for dry skin  Negative for ulcer, skin breakdown, erythema, warmth or callus  Neurological: He is alert and oriented to person, place, and time  A sensory deficit is present  Skin: Capillary refill takes less than 2 seconds  No erythema  No pallor  No ulcer in his feet  No redness or cellulitis  Psychiatric: His behavior is normal    Vitals reviewed  Diabetic Foot Exam    Patient's shoes and socks removed  Right Foot/Ankle   Right Foot Inspection  Skin Exam: skin normal, skin intact and dry skin no warmth, no callus, no erythema, no maceration, no abnormal color, no pre-ulcer, no ulcer and no callus                        Amputation: amputation right foot (Comments: partial amputation right 3rd toe)  Toe Exam: right toe deformityno swelling, no tenderness and erythema  Sensory   Vibration: absent  Proprioception: intact   Monofilament testing: absent  Vascular  Capillary refills: < 3 seconds  The right DP pulse is 0  The right PT pulse is 1+       Left Foot/Ankle  Left Foot Inspection  Skin Exam: skin normal, skin intact and dry skinno warmth, no erythema, no maceration, normal color, no pre-ulcer, no ulcer and no callus                         Toe Exam: left toe deformityno swelling, no tenderness and no erythema                   Sensory Vibration: absent  Proprioception: intact  Monofilament: absent  Vascular  Capillary refills: < 3 seconds  The left DP pulse is 0  The left PT pulse is 1+  Assign Risk Category:  Deformity present; Loss of protective sensation;  No weak pulses       Risk: 2

## 2020-02-18 NOTE — TELEPHONE ENCOUNTER
I spoke with Vicki Harmon and gave him codes [id number] and Cheryl Coronel, N39 [id number] and N32 81  He is calling his father's insurance for "special permission" for him to have surgery at [facility]

## 2020-02-19 ENCOUNTER — HOSPITAL ENCOUNTER (EMERGENCY)
Facility: HOSPITAL | Age: 60
Discharge: HOME | End: 2020-02-19
Attending: EMERGENCY MEDICINE
Payer: COMMERCIAL

## 2020-02-19 ENCOUNTER — APPOINTMENT (EMERGENCY)
Dept: CT IMAGING | Facility: HOSPITAL | Age: 60
End: 2020-02-19
Payer: COMMERCIAL

## 2020-02-19 VITALS
DIASTOLIC BLOOD PRESSURE: 64 MMHG | TEMPERATURE: 98 F | RESPIRATION RATE: 16 BRPM | HEART RATE: 72 BPM | OXYGEN SATURATION: 97 % | SYSTOLIC BLOOD PRESSURE: 127 MMHG | WEIGHT: 234 LBS | BODY MASS INDEX: 33.58 KG/M2

## 2020-02-19 DIAGNOSIS — N18.9 CKD (CHRONIC KIDNEY DISEASE): ICD-10-CM

## 2020-02-19 DIAGNOSIS — R51.9 HEADACHE: Primary | ICD-10-CM

## 2020-02-19 DIAGNOSIS — R19.7 DIARRHEA: ICD-10-CM

## 2020-02-19 DIAGNOSIS — Z79.4 TYPE 2 DIABETES MELLITUS WITH HYPERGLYCEMIA, WITH LONG-TERM CURRENT USE OF INSULIN (HCC): ICD-10-CM

## 2020-02-19 DIAGNOSIS — E11.65 TYPE 2 DIABETES MELLITUS WITH HYPERGLYCEMIA, WITH LONG-TERM CURRENT USE OF INSULIN (HCC): ICD-10-CM

## 2020-02-19 DIAGNOSIS — M79.651 RIGHT THIGH PAIN: ICD-10-CM

## 2020-02-19 DIAGNOSIS — R31.29 MICROSCOPIC HEMATURIA: ICD-10-CM

## 2020-02-19 LAB
ALBUMIN SERPL BCP-MCNC: 3.1 G/DL (ref 3.5–5)
ALP SERPL-CCNC: 132 U/L (ref 46–116)
ALT SERPL W P-5'-P-CCNC: 27 U/L (ref 12–78)
ANION GAP SERPL CALCULATED.3IONS-SCNC: 12 MMOL/L (ref 4–13)
AST SERPL W P-5'-P-CCNC: 11 U/L (ref 5–45)
BACTERIA UR QL AUTO: ABNORMAL /HPF
BASOPHILS # BLD AUTO: 0.02 THOUSANDS/ΜL (ref 0–0.1)
BASOPHILS NFR BLD AUTO: 0 % (ref 0–1)
BILIRUB SERPL-MCNC: 0.27 MG/DL (ref 0.2–1)
BILIRUB UR QL STRIP: NEGATIVE
BUN SERPL-MCNC: 74 MG/DL (ref 5–25)
CALCIUM SERPL-MCNC: 8.3 MG/DL (ref 8.3–10.1)
CHLORIDE SERPL-SCNC: 108 MMOL/L (ref 100–108)
CLARITY UR: CLEAR
CO2 SERPL-SCNC: 22 MMOL/L (ref 21–32)
COLOR UR: YELLOW
CREAT SERPL-MCNC: 4.27 MG/DL (ref 0.6–1.3)
EOSINOPHIL # BLD AUTO: 0.19 THOUSAND/ΜL (ref 0–0.61)
EOSINOPHIL NFR BLD AUTO: 3 % (ref 0–6)
ERYTHROCYTE [DISTWIDTH] IN BLOOD BY AUTOMATED COUNT: 13 % (ref 11.6–15.1)
GFR SERPL CREATININE-BSD FRML MDRD: 14 ML/MIN/1.73SQ M
GLUCOSE SERPL-MCNC: 85 MG/DL (ref 65–140)
GLUCOSE SERPL-MCNC: 93 MG/DL (ref 65–140)
GLUCOSE UR STRIP-MCNC: ABNORMAL MG/DL
HCT VFR BLD AUTO: 35 % (ref 36.5–49.3)
HGB BLD-MCNC: 11.1 G/DL (ref 12–17)
HGB UR QL STRIP.AUTO: ABNORMAL
IMM GRANULOCYTES # BLD AUTO: 0.04 THOUSAND/UL (ref 0–0.2)
IMM GRANULOCYTES NFR BLD AUTO: 1 % (ref 0–2)
KETONES UR STRIP-MCNC: NEGATIVE MG/DL
LEUKOCYTE ESTERASE UR QL STRIP: NEGATIVE
LIPASE SERPL-CCNC: 198 U/L (ref 73–393)
LYMPHOCYTES # BLD AUTO: 1.68 THOUSANDS/ΜL (ref 0.6–4.47)
LYMPHOCYTES NFR BLD AUTO: 22 % (ref 14–44)
MCH RBC QN AUTO: 28.2 PG (ref 26.8–34.3)
MCHC RBC AUTO-ENTMCNC: 31.7 G/DL (ref 31.4–37.4)
MCV RBC AUTO: 89 FL (ref 82–98)
MONOCYTES # BLD AUTO: 0.7 THOUSAND/ΜL (ref 0.17–1.22)
MONOCYTES NFR BLD AUTO: 9 % (ref 4–12)
NEUTROPHILS # BLD AUTO: 4.91 THOUSANDS/ΜL (ref 1.85–7.62)
NEUTS SEG NFR BLD AUTO: 65 % (ref 43–75)
NITRITE UR QL STRIP: NEGATIVE
NON-SQ EPI CELLS URNS QL MICRO: ABNORMAL /HPF
NRBC BLD AUTO-RTO: 0 /100 WBCS
PH UR STRIP.AUTO: 5.5 [PH] (ref 4.5–8)
PLATELET # BLD AUTO: 178 THOUSANDS/UL (ref 149–390)
PMV BLD AUTO: 10.5 FL (ref 8.9–12.7)
POTASSIUM SERPL-SCNC: 4.9 MMOL/L (ref 3.5–5.3)
PROT SERPL-MCNC: 7.2 G/DL (ref 6.4–8.2)
PROT UR STRIP-MCNC: >=300 MG/DL
RBC # BLD AUTO: 3.93 MILLION/UL (ref 3.88–5.62)
RBC #/AREA URNS AUTO: ABNORMAL /HPF
SODIUM SERPL-SCNC: 142 MMOL/L (ref 136–145)
SP GR UR STRIP.AUTO: >=1.03 (ref 1–1.03)
UROBILINOGEN UR QL STRIP.AUTO: 0.2 E.U./DL
WBC # BLD AUTO: 7.54 THOUSAND/UL (ref 4.31–10.16)
WBC #/AREA URNS AUTO: ABNORMAL /HPF

## 2020-02-19 PROCEDURE — 81001 URINALYSIS AUTO W/SCOPE: CPT

## 2020-02-19 PROCEDURE — 99284 EMERGENCY DEPT VISIT MOD MDM: CPT

## 2020-02-19 PROCEDURE — 73700 CT LOWER EXTREMITY W/O DYE: CPT

## 2020-02-19 PROCEDURE — 70450 CT HEAD/BRAIN W/O DYE: CPT

## 2020-02-19 PROCEDURE — 83690 ASSAY OF LIPASE: CPT | Performed by: EMERGENCY MEDICINE

## 2020-02-19 PROCEDURE — 82948 REAGENT STRIP/BLOOD GLUCOSE: CPT

## 2020-02-19 PROCEDURE — 80053 COMPREHEN METABOLIC PANEL: CPT | Performed by: EMERGENCY MEDICINE

## 2020-02-19 PROCEDURE — 85025 COMPLETE CBC W/AUTO DIFF WBC: CPT | Performed by: EMERGENCY MEDICINE

## 2020-02-19 PROCEDURE — 36415 COLL VENOUS BLD VENIPUNCTURE: CPT

## 2020-02-19 PROCEDURE — 99284 EMERGENCY DEPT VISIT MOD MDM: CPT | Performed by: PHYSICIAN ASSISTANT

## 2020-02-19 RX ORDER — LIDOCAINE 50 MG/G
1 PATCH TOPICAL ONCE
Status: DISCONTINUED | OUTPATIENT
Start: 2020-02-19 | End: 2020-02-19 | Stop reason: HOSPADM

## 2020-02-19 RX ORDER — ACETAMINOPHEN 325 MG/1
975 TABLET ORAL ONCE
Status: COMPLETED | OUTPATIENT
Start: 2020-02-19 | End: 2020-02-19

## 2020-02-19 RX ADMIN — LIDOCAINE 1 PATCH: 50 PATCH TOPICAL at 15:43

## 2020-02-19 RX ADMIN — ACETAMINOPHEN 975 MG: 325 TABLET, FILM COATED ORAL at 15:42

## 2020-02-19 NOTE — ED PROVIDER NOTES
History  Chief Complaint   Patient presents with    Abdominal Pain     pt reports headache, abd pain and uncontrolled blood sugars   Diarrhea     Daren Kawasaki is a 61 y o  Male with a PMHx of CVA, HTN, HLD, DM, and CKD (not currently on dialysis) who presents to the ED with complaints of right-sided temporal/pariteal headache x 2 days  Patient reports 2 episode of non-bloody watery brown diarrhea today  Patient states he has been having left posterior thigh pain over the past 3 weeks after falling at home  Patient states he has x-rays which were "normal " Patient states he has been checking his sugars at home and they have been "all over the place" ranging from "98 to 130 " Patient states sick contacts include children at home with influenza "a few weeks ago "       History provided by:  Patient  Headache   Pain location:  R parietal and R temporal  Quality:  Dull  Duration:  2 days  Associated symptoms: diarrhea and myalgias    Associated symptoms: no abdominal pain, no back pain, no blurred vision, no congestion, no cough, no dizziness, no drainage, no ear pain, no eye pain, no facial pain, no fatigue, no fever, no focal weakness, no hearing loss, no loss of balance, no nausea, no near-syncope, no neck pain, no neck stiffness, no numbness, no paresthesias, no photophobia, no seizures, no sinus pressure, no sore throat, no swollen glands, no syncope, no tingling, no URI, no visual change, no vomiting and no weakness        Prior to Admission Medications   Prescriptions Last Dose Informant Patient Reported? Taking? Blood Glucose Monitoring Suppl (TRUE METRIX METER) w/Device KIT   No No   Sig: Use to test blood sugars 3 times daily   Blood Pressure Monitoring (BLOOD PRESSURE CUFF) MISC  Spouse/Significant Other No No   Sig: Use to check blood pressure before taking blood pressure medication and 1 hour after and follow instructions provided in discharge instructions based on the readings     CVS VITAMIN B-12 1000 MCG tablet  Spouse/Significant Other No No   Sig: TAKE 1 TABLET BY MOUTH EVERY DAY   Cholecalciferol (VITAMIN D3) 85017 units CAPS  Spouse/Significant Other No No   Sig: Take 1 capsule (50,000 Units total) by mouth once a week   Continuous Blood Gluc  (DEXCOM G6 ) LANCE  Spouse/Significant Other No No   Sig: Use as directed for continuous glucose monitoring   Continuous Blood Gluc Sensor (DEXCOM G6 SENSOR) MISC  Spouse/Significant Other No No   Sig: Use as directed for continuous glucose monitoring   Change every 10 days   Continuous Blood Gluc Transmit (DEXCOM G6 TRANSMITTER) MISC  Spouse/Significant Other No No   Sig: Use as directed for continuous glucose monitoring-Change every 3 months   Incontinence Supplies (MALE URINAL) MISC  Spouse/Significant Other No No   Sig: by Does not apply route daily   Insulin Syringe-Needle U-100 (B-D INS SYRINGE 0 5CC/30GX1/2") 30G X 1/2" 0 5 ML MISC  Spouse/Significant Other No No   Sig: Inject under the skin 4 (four) times a day   ONETOUCH DELICA LANCETS 02A MISC  Spouse/Significant Other No No   Sig: by Does not apply route 3 (three) times a day   amLODIPine (NORVASC) 5 mg tablet   No No   Sig: Take 1 tablet (5 mg total) by mouth every 12 (twelve) hours   aspirin (ECOTRIN LOW STRENGTH) 81 mg EC tablet  Spouse/Significant Other Yes No   Sig: Take 81 mg by mouth daily Resume on 8/14   atorvastatin (LIPITOR) 80 mg tablet   No No   Sig: Take 1 tablet (80 mg total) by mouth daily with dinner   carvedilol (COREG) 6 25 mg tablet  Spouse/Significant Other No No   Sig: Take 1 tablet (6 25 mg total) by mouth 2 (two) times a day with meals   escitalopram (LEXAPRO) 10 mg tablet  Spouse/Significant Other No No   Sig: Take 1 tablet (10 mg total) by mouth daily at bedtime   famotidine (PEPCID) 20 mg tablet  Spouse/Significant Other Yes No   Sig: Take 20 mg by mouth daily Resume on 8/14   gabapentin (NEURONTIN) 250 mg/5 mL solution  Spouse/Significant Other No No   Sig: Take 12 mL (600 mg total) by mouth daily at bedtime   glucagon (GLUCAGON EMERGENCY) 1 MG injection  Spouse/Significant Other No No   Sig: Inject 1 mg under the skin once as needed for low blood sugar for up to 1 dose   glucose 4 g chewable tablet  Spouse/Significant Other No No   Sig: Chew 3 tablets (12 g total) as needed for low blood sugar   glucose blood test strip   No No   Sig: Use to test blood sugars 3 times daily   insulin aspart (NOVOLOG) 100 units/mL injection   No No   Sig: Inject 4 units 3 times a day with meals plus scale  Use up to 25 units daily  insulin glargine (Basaglar KwikPen) 100 units/mL injection pen   No No   Sig: Inject 20 Units under the skin daily   lidocaine (LMX) 4 % cream   No No   Sig: Apply topically 2 (two) times a day As needed to affected area      Facility-Administered Medications: None       Past Medical History:   Diagnosis Date    Chronic kidney disease     Diabetes mellitus ([facility] )     GERD (gastroesophageal reflux disease)     Hypercholesteremia     Hyperlipidemia     Hypertension     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    Neuropathy     Obesity     Osteomyelitis ([facility] )     last assessed 11/4/16    PVC's (premature ventricular contractions)     sees cardiology Dr Loly camargo    Stroke [facility])     last weeof July 2018 [address] [facility]       Past Surgical History:   Procedure Laterality Date    ABDOMINAL SURGERY      CHOLECYSTECTOMY      Percutaneous    COLONOSCOPY      CYSTOSCOPY      OTHER SURGICAL HISTORY      "stimulator to control bowel movements"    ND ESOPHAGOGASTRODUODENOSCOPY TRANSORAL DIAGNOSTIC N/A 9/27/2016    Procedure: ESOPHAGOGASTRODUODENOSCOPY (EGD); Surgeon: Almita Timmons MD;  Location: [facility];   Service: Gastroenterology    ND LAP,CHOLECYSTECTOMY N/A 2/29/2016    Procedure: LAPAROSCOPIC CHOLECYSTECTOMY ;  Surgeon: Carly Caro DO;  Location: [facility];  Service: General    ROTATOR CUFF REPAIR Right     TOE AMPUTATION Right 10/28/2016    Procedure: 3RD TOE AMPUTATION ;  Surgeon: Fernando Sy DPM;  Location: [facility];  Service:        Family History   Problem Relation Age of Onset    Leukemia Mother     Liver disease Mother     Lung cancer Mother         heavy smoker - 3 ppd    Heart disease Father     Liver disease Father     Multiple myeloma Sister     Breast cancer Sister     Urolithiasis Family     Alcohol abuse Neg Hx     Depression Neg Hx     Drug abuse Neg Hx     Substance Abuse Neg Hx     Mental illness Neg Hx      I have reviewed and agree with the history as documented  Social History     Tobacco Use    Smoking status: Never Smoker    Smokeless tobacco: Never Used   Substance Use Topics    Alcohol use: No    Drug use: No       Review of Systems   Constitutional: Negative for appetite change, chills, fatigue, fever and unexpected weight change  HENT: Negative for congestion, drooling, ear pain, hearing loss, postnasal drip, rhinorrhea, sinus pressure, sore throat, trouble swallowing and voice change  Eyes: Negative for blurred vision, photophobia, pain, discharge, redness and visual disturbance  Respiratory: Negative for cough, shortness of breath, wheezing and stridor  Cardiovascular: Negative for chest pain, palpitations, leg swelling, syncope and near-syncope  Gastrointestinal: Positive for diarrhea  Negative for abdominal pain, blood in stool, constipation, nausea and vomiting  Genitourinary: Negative for dysuria, flank pain, frequency, hematuria and urgency  Musculoskeletal: Positive for myalgias  Negative for back pain, gait problem, joint swelling, neck pain and neck stiffness  Skin: Negative for color change and rash  Neurological: Positive for headaches  Negative for dizziness, focal weakness, seizures, weakness, light-headedness, numbness, paresthesias and loss of balance         Physical Exam  Physical Exam   Constitutional: He is oriented to person, place, and time    Obese   HENT:   Head: Normocephalic and atraumatic  Nose: Nose normal    Mouth/Throat: Oropharynx is clear and moist    Eyes: Pupils are equal, round, and reactive to light  Conjunctivae and EOM are normal    Neck: Normal range of motion  Neck supple  Cardiovascular: Normal rate, regular rhythm and intact distal pulses  Pulmonary/Chest: Effort normal and breath sounds normal    Abdominal: Soft  Bowel sounds are normal  There is no tenderness  Musculoskeletal: Normal range of motion  TTP of the right posterior thigh, no ecchymosis, no erythema, normal ROM   Neurological: He is alert and oriented to person, place, and time  He has normal strength  No cranial nerve deficit or sensory deficit  GCS eye subscore is 4  GCS verbal subscore is 5  GCS motor subscore is 6  Alert and oriented to person, place, and time  PERRL  EOMI with no evidence of nystagmus  Visual fields were intact to confrontation  Visual pursuits were smooth with normal saccadic eye movements  Facial sensation intact b/l with no evidence of facial asymmetry  Hearing was normal b/l and the tongue and palate were in midline  SCM and upper trapezius strength normal b/l  No evidence of postural or action tremor,  Skin: Skin is warm and dry  Capillary refill takes less than 2 seconds  Nursing note and vitals reviewed        Vital Signs  ED Triage Vitals   Temperature Pulse Respirations Blood Pressure SpO2   02/19/20 1337 02/19/20 1337 02/19/20 1337 02/19/20 1339 02/19/20 1337   98 °F (36 7 °C) 77 18 138/70 98 %      Temp Source Heart Rate Source Patient Position - Orthostatic VS BP Location FiO2 (%)   02/19/20 1337 02/19/20 1337 02/19/20 1745 02/19/20 1745 --   Oral Monitor Sitting Right arm       Pain Score       02/19/20 1542       Worst Possible Pain           Vitals:    02/19/20 1337 02/19/20 1339 02/19/20 1745   BP:  138/70 127/64   Pulse: 77  72   Patient Position - Orthostatic VS:   Sitting         Visual Acuity      ED Medications  Medications   lidocaine (LIDODERM) 5 % patch 1 patch (1 patch Topical Medication Applied 2/19/20 1543)   acetaminophen (TYLENOL) tablet 975 mg (975 mg Oral Given 2/19/20 1542)       Diagnostic Studies  Results Reviewed     Procedure Component Value Units Date/Time    Urine Microscopic [[id number]] Collected:  02/19/20 1741    Lab Status:   In process Specimen:  Urine, Clean Catch Updated:  02/19/20 1740    Urine Macroscopic, POC [[id number]]  (Abnormal) Collected:  02/19/20 1741    Lab Status:  Final result Specimen:  Urine Updated:  02/19/20 1735     Color, UA Yellow     Clarity, UA Clear     pH, UA 5 5     Leukocytes, UA Negative     Nitrite, UA Negative     Protein, UA >=300 mg/dl      Glucose,  (1/10%) mg/dl      Ketones, UA Negative mg/dl      Urobilinogen, UA 0 2 E U /dl      Bilirubin, UA Negative     Blood, UA Moderate     Specific Gravity, UA >=1 030    Narrative:       CLINITEK RESULT    Comprehensive metabolic panel [[id number]]  (Abnormal) Collected:  02/19/20 1401    Lab Status:  Final result Specimen:  Blood from Arm, Right Updated:  02/19/20 1702     Sodium 142 mmol/L      Potassium 4 9 mmol/L      Chloride 108 mmol/L      CO2 22 mmol/L      ANION GAP 12 mmol/L      BUN 74 mg/dL      Creatinine 4 27 mg/dL      Glucose 93 mg/dL      Calcium 8 3 mg/dL      AST 11 U/L      ALT 27 U/L      Alkaline Phosphatase 132 U/L      Total Protein 7 2 g/dL      Albumin 3 1 g/dL      Total Bilirubin 0 27 mg/dL      eGFR 14 ml/min/1 73sq m     Narrative:       National Kidney Disease Foundation guidelines for Chronic Kidney Disease (CKD):     Stage 1 with normal or high GFR (GFR > 90 mL/min/1 73 square meters)    Stage 2 Mild CKD (GFR = 60-89 mL/min/1 73 square meters)    Stage 3A Moderate CKD (GFR = 45-59 mL/min/1 73 square meters)    Stage 3B Moderate CKD (GFR = 30-44 mL/min/1 73 square meters)    Stage 4 Severe CKD (GFR = 15-29 mL/min/1 73 square meters)    Stage 5 End Stage CKD (GFR <15 mL/min/1 73 square meters)  Note: GFR calculation is accurate only with a steady state creatinine    Lipase [[id number]]  (Normal) Collected:  02/19/20 1401    Lab Status:  Final result Specimen:  Blood from Arm, Right Updated:  02/19/20 1702     Lipase 198 u/L     CBC and differential [[id number]]  (Abnormal) Collected:  02/19/20 1401    Lab Status:  Final result Specimen:  Blood from Arm, Right Updated:  02/19/20 1649     WBC 7 54 Thousand/uL      RBC 3 93 Million/uL      Hemoglobin 11 1 g/dL      Hematocrit 35 0 %      MCV 89 fL      MCH 28 2 pg      MCHC 31 7 g/dL      RDW 13 0 %      MPV 10 5 fL      Platelets 236 Thousands/uL      nRBC 0 /100 WBCs      Neutrophils Relative 65 %      Immat GRANS % 1 %      Lymphocytes Relative 22 %      Monocytes Relative 9 %      Eosinophils Relative 3 %      Basophils Relative 0 %      Neutrophils Absolute 4 91 Thousands/µL      Immature Grans Absolute 0 04 Thousand/uL      Lymphocytes Absolute 1 68 Thousands/µL      Monocytes Absolute 0 70 Thousand/µL      Eosinophils Absolute 0 19 Thousand/µL      Basophils Absolute 0 02 Thousands/µL     Fingerstick Glucose (POCT) [[id number]]  (Normal) Collected:  02/19/20 1628    Lab Status:  Final result Updated:  02/19/20 1630     POC Glucose 85 mg/dl                  CT head without contrast   Final Result by Jose Kuo MD (02/19 1645)      1  Stable exam with mild atrophy and change of chronic microangiopathic and ischemic disease as noted without acute intracranial abnormality noted  2   Sinus mucosal disease as described  Workstation performed: [id number]         CT lower extremity wo contrast right   Final Result by Lemuel Dakins, MD (02/19 1645)      1  No acute osseous abnormality   2  Mild thickening of the urinary bladder wall  While this may be secondary to underdistention, cystitis and chronic bladder outlet obstruction also differentials        The study was marked in EPIC for significant notification  Workstation performed: [id number]                    Procedures  Procedures         ED Course  ED Course as of Feb 19 1800   Wed Feb 19, 2020   1712 Patient states he is feeling better  46 Educated patient regarding diagnosis and management  Advised patient to follow up with PCP  Advised patient to RTER for persistent or worsening symptoms  MDM  Number of Diagnoses or Management Options  CKD (chronic kidney disease): new and does not require workup  Diarrhea: new and does not require workup  Headache: new and does not require workup  Microscopic hematuria: new and does not require workup  Right thigh pain: new and does not require workup  Diagnosis management comments: Labs with stable CKD  UA with elevated glucose and blood  CT Left Hip 1  No acute osseous abnormality  2   Mild thickening of the urinary bladder wall  While this may be secondary to underdistention, cystitis and chronic bladder outlet obstruction also differentials  Patient does not currently have urinary symptoms  CT Head 1  Stable exam with mild atrophy and change of chronic microangiopathic and ischemic disease as noted without acute intracranial abnormality noted  2   Sinus mucosal disease as described  Headache improved with Tylenol  Patient did not have any episodes of diarrhea since arrival to the ED and was tolerating oral intake  I provided patient with strict RTER precautions  I advised patient follow-up with PCP in 24-48 hours  Patient verbalized understanding            Amount and/or Complexity of Data Reviewed  Clinical lab tests: reviewed and ordered  Tests in the radiology section of CPT®: ordered and reviewed  Review and summarize past medical records: yes    Patient Progress  Patient progress: improved        Disposition  Final diagnoses:   Headache   Right thigh pain   CKD (chronic kidney disease)   Diarrhea   Microscopic hematuria     Time reflects when diagnosis was documented in both MDM as applicable and the Disposition within this note     Time User Action Codes Description Comment    2/19/2020  5:36 PM Edward Nestle Add [R51] Headache     2/19/2020  5:36 PM Edward Nestle Add [H40 678] Right thigh pain     2/19/2020  5:37 PM Edward Nestle Add [N18 9] CKD (chronic kidney disease)     2/19/2020  5:37 PM Edward Nestle Add [R19 7] Diarrhea     2/19/2020  5:38 PM Edward Nestle Add [R31 29] Microscopic hematuria       ED Disposition     ED Disposition Condition Date/Time Comment    Discharge Stable Wed Feb 19, 2020  5:38  [address] discharge to home/self care  Follow-up Information     Follow up With Specialties Details Why Contact Info Additional [address] Emergency Department Emergency Medicine Go to  If symptoms worsen [address] [facility]  [facility] ED, [address], [place], [place], [address],  Internal Medicine Schedule an appointment as soon as possible for a visit   [phone]   [address] [place] [address]  [phone]       [facility] Urology Schedule an appointment as soon as possible for a visit   Monico Perales [address] [place] [id number]  70  [facility], [address], [place], [address] [facility]          Discharge Medication List as of 2/19/2020  5:38 PM      START taking these medications    Details   insulin glargine (Basaglar KwikPen) 100 units/mL injection pen Inject 20 Units under the skin daily, Starting Wed 2/19/2020, Normal         CONTINUE these medications which have NOT CHANGED    Details   amLODIPine (NORVASC) 5 mg tablet Take 1 tablet (5 mg total) by mouth every 12 (twelve) hours, Starting Thu 1/16/2020, Normal      aspirin (ECOTRIN LOW STRENGTH) 81 mg EC tablet Take 81 mg by mouth daily Resume on 8/14, Historical Med      atorvastatin (LIPITOR) 80 mg tablet Take 1 tablet (80 mg total) by mouth daily with dinner, Starting Thu 1/16/2020, Normal      Blood Glucose Monitoring Suppl (TRUE METRIX METER) w/Device KIT Use to test blood sugars 3 times daily, Normal      Blood Pressure Monitoring (BLOOD PRESSURE CUFF) MISC Use to check blood pressure before taking blood pressure medication and 1 hour after and follow instructions provided in discharge instructions based on the readings  , Print      carvedilol (COREG) 6 25 mg tablet Take 1 tablet (6 25 mg total) by mouth 2 (two) times a day with meals, Starting Wed 7/10/2019, Normal      Cholecalciferol (VITAMIN D3) 10658 units CAPS Take 1 capsule (50,000 Units total) by mouth once a week, Starting Fri 10/11/2019, Normal      Continuous Blood Gluc  (DEXCOM G6 ) LANCE Use as directed for continuous glucose monitoring, Normal      Continuous Blood Gluc Sensor (DEXCOM G6 SENSOR) MISC Use as directed for continuous glucose monitoring   Change every 10 days, Normal      Continuous Blood Gluc Transmit (DEXCOM G6 TRANSMITTER) MISC Use as directed for continuous glucose monitoring-Change every 3 months, Normal      CVS VITAMIN B-12 1000 MCG tablet TAKE 1 TABLET BY MOUTH EVERY DAY, Normal      escitalopram (LEXAPRO) 10 mg tablet Take 1 tablet (10 mg total) by mouth daily at bedtime, Starting Tue 8/13/2019, Normal      famotidine (PEPCID) 20 mg tablet Take 20 mg by mouth daily Resume on 8/14, Historical Med      gabapentin (NEURONTIN) 250 mg/5 mL solution Take 12 mL (600 mg total) by mouth daily at bedtime, Starting Thu 3/7/2019, Normal      glucagon (GLUCAGON EMERGENCY) 1 MG injection Inject 1 mg under the skin once as needed for low blood sugar for up to 1 dose, Starting Sun 8/25/2019, Print      glucose 4 g chewable tablet Chew 3 tablets (12 g total) as needed for low blood sugar, Starting Wed 8/15/2018, Normal      glucose blood test strip Use to test blood sugars 3 times daily, Normal      Incontinence Supplies (MALE URINAL) MISC by Does not apply route daily, Starting Mon 9/24/2018, Print      insulin aspart (NOVOLOG) 100 units/mL injection Inject 4 units 3 times a day with meals plus scale  Use up to 25 units daily  , No Print      Insulin Syringe-Needle U-100 (B-D INS SYRINGE 0 5CC/30GX1/2") 30G X 1/2" 0 5 ML MISC Inject under the skin 4 (four) times a day, Starting Wed 8/14/2019, Normal      lidocaine (LMX) 4 % cream Apply topically 2 (two) times a day As needed to affected area, Starting Mon 2/3/2020, Normal      ONETOUCH DELICA LANCETS 29O MISC by Does not apply route 3 (three) times a day, Starting Tue 11/27/2018, Normal           No discharge procedures on file      PDMP Review     None          ED Provider  Electronically Signed by           Kam Alanis PA-C  02/19/20 5330

## 2020-02-19 NOTE — ED NOTES
Spoke to the lab who states they never received lab work  Will re draw and send when pt returns from BHAVIN Montano RN  02/19/20 1687

## 2020-02-19 NOTE — TELEPHONE ENCOUNTER
Patient son calling to advise insurance has approved surgery but needs a letter of request  from doctor since he is out of network  Please call patient son back

## 2020-02-19 NOTE — ED NOTES
Patient transported to CT, will check POCT glucose when pt returns     [address]  02/19/20 0704

## 2020-02-19 NOTE — ED NOTES
New blood specimens sent to lab with white patient labels (grn/yellow, yellow, 3ml lav, grn/black)       Tali Andres RN  02/19/20 1932

## 2020-02-19 NOTE — DISCHARGE INSTRUCTIONS
ÇáÕÏÇÚ ÇáÍÇÏ   ÇáÑÚÇíÉ ÇáÎÇÑÌíÉ ááãÑÖì:   ÇáÕÏÇÚ ÇáÍÇÏ  åæ Ãáã Ãæ ÚÏã ÑÇÍÉ íÈÏÃ ÝÌÃÉ æíÒÏÇÏ ÓæÁðÇ ÈÓÑÚÉ  ÞÏ ÊÕÇÈ ÈÇáÕÏÇÚ ÇáÍÇÏ ÚäÏãÇ ÊÔÚÑ ÈÇáÊæÊÑ Ãæ ÈÚÏ ÊäÇæá ÃØÚãÉ ãÚíäÉ ÝÞØ  ÞÏ ÊÍÏË ÃäæÇÚ ÃÎÑì ãä Ãáã ÇáÕÏÇÚ ÇáÍÇÏ ßá íæã¡ æÝí ÈÚÖ ÇáÃÍíÇä ÚÏÉ ãÑÇÊ Ýí Çáíæã  æÞÏ áÇ íßæä åäÇß ÓÈÈ ãÚÑæÝ áÅÕÇÈÊß ÈÇáÕÏÇÚ ÇáÍÇÏ  æÞÏ íÍÝÒ ÇáÅÕÇÈÉ Èå ÇáÖÛØ Ãæ ÇáÊÚÈ Ãæ FGNIJPADQ Ãæ ÇáØÚÇã Ãæ ÇáÕÏãÉ  ÇáÃäæÇÚ ÇáÔÇÆÚÉ ááÕÏÇÚ ÇáÍÇÏ:   · ÕÏÇÚ ÇáÊæÊÑ  æåæ ÃßËÑ ÃäæÇÚ ÇáÕÏÇÚ ÇäÊÔÇÑÇ  íÍÏË åÐÇ ÇáÕÏÇÚ ÚÇÏÉ Ýí æÞÊ ãÊÃÎÑ ãä ÇáÙåíÑÉ æÊÎÊÝí Ýí ÇáãÓÇÁ  æíßæä ÇáÃã ÚÇÏÉ ãä ãÚÊÏá Åáì áØíÝ  ÞÏ ÊÚÇäí ãä ãÔßáÇÊ HBUPR ÈÊÍãá ÇáÖæÁ ÇáÓÇØÚ Ãæ ÇáÖæÖÇÁ ÇáÚÇáíÉ  íßæä ÇáÃáã ÚÇÏÉ ãäÊÔÑ ÚÈÑ ÇáÌÈíä Ãæ Ýí ãÄÎÑÉ ÇáÑÃÓ¡ æÛÇáÈðÇ ãÇ íßæä Ýí ÌÇäÈ æÇÍÏ ÝÞØ  ÞÏ íÍÏË åÐÇ ÇáäæÚ ãä ÇáÕÏÇÚ íæãíÇ  · ÇáÕÏÇÚ ÇáäÕÝí:  æíÓÈÈ ÃáãðÇ FTNHKDE Åáì ÔÏíÏ  æíÓÊãÑ ÇáÕÏÇÚ ÚÇÏÉ ãä íæã Åáì 3 ÃíÇã¡ ãÚ ÇÍÊãÇáíÉ ÇáãÚÇæÏÉ  íßæä ÇáÃáã Ýí ÇáÚÇÏÉ Ýí ÌÇäÈ æÇÍÏ ÝÞØ ãä ÇáÑÃÓ¡ æáßä åÐÇ ÇáÌÇäÈ ÞÏ íÊÛíÑ  íÍÏË ÇáÕÏÇÚ ÇáäÕÝí ÛÇáÈðÇ Ýí ÇáÕÏÛ Ãæ ãÄÎÑÉ ÇáÑÃÓ Ãæ ÎáÝ ÇáÚíä  ÞÏ íßæä ÇáÃáã Úáì åíÆÉ äÈÖÇÊ Ãæ ÍÇÏðÇ LNAUQQLAG  · ÇáÕÏÇÚ ÇáäÕÝí ÇáãÕÍæÈ ÈÇáÃæÑÉ  íÚäí Ãäß ÊÑì ÔíÆðÇ Ãæ ÊÔÚÑ ÈÔíÁ ÞÈá ÇáÅÕÇÈÉ ÈÇáÕÏÇÚ ÇáäÕÝí  ÝÞÏ ÊÑì äÞØÉ ÕÛíÑÉ ãÍÇØÉ ÈÎØæØ ãÊÚÑÌÉ ÓÇØÚÉ  ZSWTJNCJ æÇáÃÚÑÇÖ ÇáÃÎÑì ÞÏ ÊÊÈÚ ÇáÃæÑÉ  · ÇáÕÏÇÚ ÇáÚäÞæÏí:  íßæä ÇáÃáã ÚÇÏÉ Ýí ÌÇäÈ æÇÍÏ ÝÞØ  æíÓÈÈ ÛÇáÈÇ ÃáãðÇ ÍÇÏðÇ¡ æíãßä Ãä íÓÊãÑ áãÏÉ ãä 30 ÏÞíÞÉ Åáì ÓÇÚÊíä  ÞÏ íÍÏË åÐÇ ÇáÕÏÇÚ ãÑÉ Ãæ ãÑÊíä ßá íæã¡ æÚáì ÇáÃÛáÈ ÃËäÇÁ Çááíá  ÞÏ íÊÓÈÈ ÇáÃáã Ýí ÅíÞÇÙß  íõÑÌì ØáÈ ÇáÑÚÇíÉ ÝæÑðÇ Ýí ÇáÍÇáÇÊ ÇáÊÇáíÉ:   · ÅÐÇ ßäÊ ÊÚÇäí ãä Ãáã ÍÇÏ    · ÊÚÇäí ãä ÊÎÏÑ Ãæ ÖÚÝ Ýí ÃÍÏ ÌÇäÈí æÌåß Ãæ ÌÓãß  · ÊÚÇäí ãä ÕÏÇÚ íÍÏË ÚÞÈ ÇáÊÚÑÖ áÖÑÈÉ Ýí ÇáÑÃÓ Ãæ ÓÞæØ Ãæ ÑÖÍ ÂÎÑ  · ÊÔÚÑ ÈÕÏÇÚ¡ Ãæ ÊÚÇäí ãä ÇáäÓíÇä Ãæ ãÑÊÈß¡ Ãæ ÊÚÇäí ãä ÕÚæÈÉ Ýí ÇáäØÞ  · ÊÔÚÑ ÈÕÏÇÚ Nolvia Herter AYECMHG æãÕÇÈ HTMNUDV  íõÑÌì BRPJSID ÈãÞÏã ÇáÑÚÇíÉ ÇáÕÍíÉ áÏíß Ýí VZGMNGA ÇáÊÇáíÉ:   · ÊÚÇäí ãä ÕÏÇÚ Ãæ ÞíÁ ãÓÊãÑ  · ÊÚÇäí ãä ÕÏÇÚ Ýí ßá íæã æáÇ íÊÍÓä¡ ÍÊì ÈÚÏ OKYZQZ      · ÍÏËÊ ÊÛíÑÇÊ Ýí ÍÇáÇÊ ÇáÕÏÇÚ¡ Ãæ ÙåÑÊ ÃÚÑÇÖ ÌÏíÏÉ ÚäÏãÇ ÊÕÇÈ ÈÇáÕÏÇÚ  · ÅÐÇ ßÇäÊ áÏíß CJVKXUJYH Ãæ ãÎÇæÝ BPXPLN ÈÍÇáÊß ÇáãÑÖíøÉ Ãæ ÈÇáÑÚÇíÉ  ÇáÚáÇÌ:   · ÇáÏæÇÁ  ááÍÏ ãä ÇáÃáã  ÓæÝ íÚÊãÏ ÇáÏæÇÁ ÇáÐí íæÕíß Èå ãÞÏã ÇáÑÚÇíÉ ÇáÕÍíÉ Úáì äæÚ ÇáÕÏÇÚ ÇáÐí ÊÚÇäí ãäå  æÓæÝ íÊÚíä Úáíß ÊäÇæá ÃÏæíÉ ÇáÕÏÇÚ OOUDRPGG áß ÍÓÈ HVZTZFVMJ¡ æÐáß ááæÞÇíÉ ãä ÍÏæË ãÔßáÉ ÊÓãì ÇáÕÏÇÚ ÇáÇÑÊÏÇÏí  Elsie Michael åÐÇ ÇáäæÚ ãä ÇáÕÏÇÚ ÚäÏ NOCSONWJY ÇáãäÊÙã áãÓßäÇÊ ÇáÃáã ãÚ ÇÖØÑÇÈÇÊ ÇáÕÏÇÚ  ÞÏ ÊÓÇÚÏ ãÖÇÏÇÊ ÇáÇáÊåÇÈ ÛíÑ ÇáÓÊíÑæíÏíÉ æÇáÃÓíÊÇãíäæÝíä Ýí ÚáÇÌ ÈÚÖ ÃäæÇÚ ÇáÕÏÇÚ  · ÇáÇÑÊÌÇÚ ÇáÈíæáæÌí:  ÞÏ íÓÇÚÏß Úáì ÊÚáã ØÑíÞÉ ÊÛííÑ ÑÏæÏ ÇáÝÚá ÇáäÇÊÌÉ Úä ÇáÊæÊÑ  Úáì ÓÈíá AENRXL¡ ÓæÝ ÊÊÚáã ÅÈØÇÁ ãÚÏá ÖÑÈÊ ÞáÈß ÚäÏãÇ ÊÛÖÈ  ßãÇ íãßäß ÊÚáã ãäÚ ÍÏæË ÈÚÖ ÃäæÇÚ ÇáÕÏÇÚ Úä ØÑíÞ ÇáÌãÚ Èíä QZIJGYC PJFBPUP  · SSODCS ÇáãÚÑÝí TCDYZTG¡  Ãæ ÅÏÇÑÉ HVIKTPB¡ ÞÏ íÓÊÎÏã ãÚ ÃäæÇÚ ÃÎÑì ãä GWLTUT áãäÚ ÇáÕÏÇÚ  ÇáÊÍßã Ýí ÇáÃÚÑÇÖ:   · ÖÚ ÔíÆðÇ ÓÇÎäðÇ Ãæ ËáÌ  Úáì ÇáãäØÞÉ ÇáÊí ÊÔÚÑ ÝíåÇ ÈÕÏÇÚ  ÇÓÊÎÏã ßãÇÏÇÊ ÈÇÏÑÉ Ãæ ÓÇÎäÉ  ÈÇáäÓÈÉ ááßãÇÏÇÊ ÇáÈÇÑÏÉ¡ íãßäß æÖÚ ËáÌ ãÌÑæÔ Ýí ßíÓ ãä ÇáÈáÇÓÊíß  Þã ÈÊÛØíÉ ÇáßíÓ Ãæ ÇáßãÇÏÉ ÈãäÔÝÉ ÞÈá æÖÚåÇ Úáì ÇáÌáÏ  ÈÅãßÇä ÇáËáÌ HZAADPFT ÇáãÓÇÚÏÉ Úáì ÇáÍÏ ãä ÇáÃáã¡ ßãÇ ÊÓÇÚÏ YBYVUQQ ÃíÖðÇ Úáì ÊÞáíá ÊÔäÌ ÇáÚÖáÇÊ  ÖÚ ãÕÏÑ ÍÑÇÑÉ áãÏÉ 20 Åáì 30 ÏÞíÞÉ ßá ÓÇÚÊíä Úáì ÑÃÓß  ÖÚ ÇáËáÌ áãÏÉ 15 Åáì 20 ÏÞíÞÉ ßá ÓÇÚÉ Úáì ÑÃÓß  ÖÚ ãÕÏÑ PKSTZHV Ãæ ÇáËáÌ ÍÓÈ ÇáãÏÉ æÚÏÏ ÇáÃíÇã ÇáãÐßæÑíä Ýí QAYLOSENO  æíãßäß ÊÈÏíá WGHAZOL ÈÇáËáÌ  · Þã ÈÅÑÎÇÁ ÚÖáÇÊß  ÇÓÊáÞ Ýí æÖÚ ãÑíÍ æÃÛáÞ Úíäíß  Þã ÈÅÑÎÇÁ ÚÖáÇÊß ÈÈØÁ  ÇÈÏÃ ãä ÃÕÇÈÚß æÕæáÇð Åáì ÃÚáì ÌÓãß  · ÓÌá ÍÇáÇÊ ÇáÕÏÇÚ ÇáÊí ÊÚÇäí ãäåÇ  æÏæøä æÞÊ ÈÏÁ TXCYTVV ãä ÇáÕÏÇÚ ææÞÊ ÊæÞÝåÇ  ÃÏÑöÌ ÇáÃÚÑÇÖ æãÇ ßäÊ ÊÝÚáå Úä ÈÏÁ ÇáÕÏÇÚ  ÓÌøá ãÇ IFQHCLE Ãæ ÔÑÈÊå ãäÐ 24 ÓÇÚÉ ÞÈá ÈÏÁ ÇáÕÏÇÚ  æíõÑÌì æÕÝ ÇáÃáã æãßÇä ÇáæÌÚ  ÊÊÈÚ ãÇ MRSXH TVCJZ ÇáÕÏÇÚ æÅÐÇ ãÇ ßÇä ãÌÏíðÇ Ãã áÇ  ÇáæÞÇíÉ ãä ÇáÕÏÇÚ ÇáÍÇÏ:   · ÊÌäÈ Ãí ÔíÁ ãä ÔÃäå ÊÍÝíÒ ÇáÔÚæÑ ÈÕÏÇÚ ÍÇÏ  æÊÔãá ÇáÃãËáÉ ÇáÊÚÑÖ ááãæÇÏ ÇáßíãÇæíÉ æÇáØáæÚ Åáì ÇÑÊÝÇÚÇÊ ÚÇáíÉ æÚÏã ÇáÍÕæá Úáì Çáäæã ÇáßÇÝí  ÃäÔÆ áäÝÓß ÑæÊíäðÇ ãäÊÙãðÇ ááäæã   æÇÎáÏ Åáì Çáäæã Ýí ÇáæÞÊ äÝÓå æÇÓÊíÞÙ ÃíÖðÇ Ýí ÇáæÞÊ äÝÓå íæãíðÇ  ÊÌäÈ DBSBTPF ÇáÃÌåÒÉ UMCZORTYXZW ÞÈá ÇáÎáæÏ ááäæã  ÝåÐå ÇáÃÌåÒÉ ãä ÔÃäåÇ ÊÍÝíÒ ÇáÔÚæÑ ÈÇáÕÏÇÚ Ãæ ãäÚß ãä Çáäæã ÌíÏðÇ  · ÊÌäÈ ÇáÊÏÎíä  íãßä Ãä íÊÓÈÈ ÇáäíßæÊíä æÇáãæÇÏ ÇáßíãíÇÆíÉ ÇáÃÎÑì ÇáãæÌæÏÉ Ýí ÇáÓÌÇÆÑ æÇáÓíÌÇÑ Ýí ÊÍÝíÒ ÇáÕÏÇÚ ÇáÍÇÏ æÊÝÇÞãå  ÊÚÑøÝ Úáì RLNZGQLDK ÇááÇÒãÉ ãä ãÞÏã ÇáÑÚÇíÉ ÇáÕÍíÉ ÅÐÇ ßäÊ ãä ÇáãÏÎäíä ÍÇáíðÇ VVWYKJ Åáì ãÓÇÚÏÉ ááÅÞáÇÚ Úä ÇáÊÏÎíä  Åä ÇáÓÌÇÆÑ FJPEDHGKOUO Ãæ ÇáÊÈÛ ÚÏíã ÇáÏÎÇä íÍÊæí ÃíÖðÇ Úáì ÇáäíßæÊíä  David Dock ãÞÏã ÇáÑÚÇíÉ ÇáÕÍíÉ ÇáÐí DSBGQX ãÚå ÞÈá ÊäÇæá åÐå ÇáãäÊÌÇÊ  · Þáá ãä äÓÈÉ FTZBWAJPO ÍÓÈ QHBOVUWSC:  OBLVVCY ÈÅãßÇäå ÊÍÝíÒ ÇáÅÕÇÈÉ ÈÇáÕÏÇÚ ÇáÍÇÏ Ãæ ÒíÇÏÊå ÓæÁðÇ  ÅÐÇ ßäÊ ÊÚÇäí ãä ÕÏÇÚ ÚäÞæÏí¡ ÝáÇ ÊÔÑÈ RPQHAI ÃËäÇÁ ÇáäæÈÉ  ÈÇáäÓÈÉ ááÃäæÇÚ ÇáÃÎÑì ãä ÇáÕÏÇÚ¡ ÇÓÊÝÓÑ ãä ãÞÏã ÇáÑÚÇíÉ ÚãÇ ÅÐÇ ßÇä ÊäÇæá FZYLRFHWP ÂãäðÇ ÈÇáäÓÈÉ áß ãä ÚÏãå  ÇÓÊÝÓÑ Úä ÇáßãíÉ ÇáÂãä áß ÊäÇæáåÇ æÚÏÏ ÇáãÑÇÊ  · ãÇÑÓ ÇáÊãÇÑíä æÝÞðÇ ááÊæÌíåÇÊ  ÈÅãßÇä ããÇÑÓÉ ÇáÊãÇÑíä ÇáÍÏ ãä ÇáÊæÊÑ æÇáãÓÇÚÏÉ Úáì ÇáÊÎáÕ ãä Ãáã ÇáÕÏÇÚ  ÇÓÊåÏÝ ããÇÑÓÉ ÇáÊãÇÑíä ÇáÑíÇÖíÉ ÇáÈÏäíÉ áãÏÉ 30 ÏÞíÞÉ Ýí ãÚÙã ÃíÇã ÇáÃÓÈæÚ  ÈÅãßÇä ãÞÏã ÇáÑÚÇíÉ ÇáÕÍíÉ ÇáÎÇÕ Èß ãÓÇÚÏÊß Úáì æÖÚ ÎØÉ ááÊãÑíäÇÊ ÇáÑíÇÖíÉ  · íÌÈ ÊäÇæá ãÌãæÚÉ ãÊäæÚÉ ãä ÇáÃØÚãÉ ÇáÕÍíÉ  ÊÔÊãá ÇáÃØÚãÉ ÇáÕÍíÉ Úáì ÇáÝÇßåÉ SCELDPTLW æãäÊÌÇÊ ÇáÃáÈÇä ÞáíáÉ ÇáÏÓã æÇááÍæã ÇáÎÇáíÉ ãä ÇáÏåæä æÇáÍÈæÈ ÇáßÇãáÉ HCMONABOSF ÇáãØåíÉ  æÈÅãßÇä ãÞÏã ÇáÑÚÇíÉ ÇáÕÍíÉ Ãæ ÇÎÊÕÇÕí ÇáÊÛÐíÉ ÇáÎÇÕ Èß ãÓÇÚÏÊß Úáì æÖÚ ÎØØ ÛÐÇÆíÉ Åä ßäÊ ÈÍÇÌÉ áÊÌäÈ ÇáÃØÚãÉ KDSRMHR ááÕÏÇÚ  íÌÈ ÇáãÊÇÈÚÉ ãÚ ãÞÏã ÇáÑÚÇíÉ ÇáÕÍíÉ HASCFOP Úäß æÝÞðÇ ááÅÑÔÇÏÇÊ:  ÃÍÖöÑ ãÚß åÐÇ ÇáÓÌá ÇáÎÇÕ ÈÇáÕÏÇÚ ÚäÏ ÒíÇÑÉ ãÞÏã ÇáÑÚÇíÉ ÇáÕÍíÉ  íõÑÌì ÊÏæíä ÃÓÆáÊß áÊÊÐßøÑ ØÑÍåÇ ÃËäÇÁ ÒíÇÑÇÊß ááØÈíÈ  © 2017 [address] Information is for End User's use only and may not be sold, redistributed or otherwise used for commercial purposes  All illustrations and images included in CareNotes® are the copyrighted property of [employer] , [employer]  or Vahid [employer]    The above information is an  only  It is not intended as medical advice for individual conditions or treatments  Talk to your doctor, nurse or pharmacist before following any medical regimen to see if it is safe and effective for you  DBCQCFZ CWHCE   ãÇ íÌÈ Úáíß ãÚÑÝÊå:   ãÇ åæ HVUHJXE ÇáÍÇÏ¿  íÈÏÃ FXHAELT ÇáÍÇÏ ÈÓÑÚÉ æíÓÊãÑ áÝÊÑÉ ÞÕíÑÉ¡ íæã Åáì 3 ÃíÇã ÚÇÏÉð  æíãßä Ãä íÓÊãÑ Åáì ÃÓÈæÚíä  ãÇ ÇáÐí íÊÓÈÈ Ýí MZZODMW ÇáÍÇÏ¿   · ÇáÈßÊíÑíÇ¡ ãËá ÇáÅÔÑíßíÉ E Ãæ ÈßÊíÑíÇ ÇáÓáãæäíáÇ    · ÇáÝíÑæÓÇÊ¡ ãËá ÇáÚÏæì ÇáÝíÑæÓíÉ ÇáÚÌáíÉ æÚÏæì ÝíÑæÓ ÇáäæÑæ    · ÃÍÏ ÇáØÝíáíÇÊ¡ ãËá ÇáÌíÇÑÏíÉ    · ÇáÃÏæíÉ¡ ãËá ÇáãáíäÇÊ Ãæ ãÖÇÏÇÊ ÇáÍãæÖÉ Ãæ ÇáãÖÇÏÇÊ ÇáÍíæíÉ    · ÇáÍÓÇÓíÉ ãä AJZYSLCW¡ Ãæ ÇáÕæíÇ¡ Ãæ ÇáÌáæÊíä    · ÊäÇæá ÃØÚãÉ Ãæ ÔÑÈ ãÇÁ íÍÊæí Úáì ÌÑÇËíã    · PEDNJDLM ÇáØÈíÉ¡ ãËá JLOBSY ÇáßíãíÇÆí Ãæ ÇáÅÔÚÇÚí  ãÇ åí OHUVVNAZ TUCAOYAH ÇáÃÎÑì ÇáÊí ÞÏ ÊÙåÑ áÏíø ãÚ MKVQSVG ÇáÍÇÏ¿  ÞÏ ÊÊÚÑÖ áËáÇËÉ Ãæ ÃßËÑ ãä ÍÇáÇÊ DCXNAZN  æÞÏ íÕÚÈ Úáíß BNEGBX Ýí ÍÇáÉ ENKXJVI  ÞÏ Êßæä ÊÚÇäí ÃíÖðÇ ãä Ãí ãä ÇáÊÇáí:  · ÇáÍãì æÇáÑÌÝÉ    · ÕÏÇÚ Ãæ Ãáã ÈÇáÈØä    · ÇáÛËíÇä æÇáÞíÁ    · ÃÚÑÇÖ ÇáÌÝÇÝ ãËá ÇáÚØÔ Ãæ ÊäÇÞÕ ßãíÉ ÇáÈæá Ãæ ÌÝÇÝ ÇáÈÔÑÉ Ãæ ÇáÚíäÇä CIYTQQZWZ Ãæ ÎÝÞÇä Ãæ ÓÑÚÉ Ýí äÈÖÇÊ ÇáÞáÈ  ãÇ ÇáÐí íÍÊÇÌ ãÞÏã ÇáÑÚÇíÉ ÇáÕÍíÉ Åáì ãÚÑÝÊå ÈÎÕæÕ HGEFTEG ÇáÍÇÏ ÇáÐí ÃÚÇäí ãäå¿  ÓíÓÃá ãÞÏã ÇáÑÚÇíÉ ÇáÕÍíÉ Úä ÇáÃÚÑÇÖ ÇáÊí ÊÚÇäí ãäåÇ  æÓæÝ íÓÃá ÚãÇ ÊäÇæáÊå ãÄÎÑðÇ æãÇ ÅÐÇ ßäÊ ÞÏ ÓÇÝÑÊ Åáì Ïæá ÃÎÑì Ãã áÇ  ÃÎÈÑ ãÞÏã ÇáÑÚÇíÉ ÇáÕÍíÉ Úä ÇáÃÏæíÉ ÇáÊí ANKYYLHU Ãæ ãÇ ÅÐÇ ßäÊ ÈÌæÇÑ ÔÎÕ ãÑíÖ  ÞÏ íÝÍÕß ãÞÏã ÇáÑÚÇíÉ ÇáÕÍíÉ ÈÍËðÇ Úä ÚáÇãÇÊ ÇáÌÝÇÝ  ßíÝ ÊÊã CHCIMI JZJRIQL ÇáÍÇÏ¿  ÚÇÏÉð ãÇ íÍÏË ÊÍÓä ÚäÏ ÇáÅÕÇÈÉ AFUKZTFV PDCTQ Ïæä ÚáÇÌ  æÞÏ ÊÍÊÇÌ Åáì Ãí ããÇ íáí ÅÐÇ ßÇä ONJDYFS ÇáÐí ÊÚÇäí ãäå ÍÇÏðÇ Ãæ ÇÓÊãÑ áãÇ íÒíÏ Úä ÃíÇã ÞáíáÉ  · ÏæÇÁ YESXGXF  ÏæÇÁ ãÊÇÍ Ïæä æÕÝÉ ØÈíÉ íÓÇÚÏ Ýí ÊÞáíá JVHTWYX Ãæ ÅíÞÇÝå  · ÇáãÖÇÏÇÊ ÇáÍíæíÉ  ÞÏ íÊã æÕÝåÇ áÚáÇÌ ÚÏæì äÇÊÌÉ Úä ÇáÈßÊíÑíÇ  · ÏæÇÁ ÇáØÝíáíÇÊ  ÞÏ íÊã æÕÝå áÚáÇÌ ÚÏæì äÇÊÌÉ Úä ÇáØÝíáíÇÊ    ßíÝ íãßä ÇáÊÍßã Ýí FTBZHFV ÇáÍÇÏ¿   · íõÑÌì ÊäÇæá CPJFKTO ÍÓÈ ALSDMWCKD  ÝÅä SBYCEBW ÊÓÇÚÏ Úáì ÇáæÞÇíÉ ãä ÇáÌÝÇÝ ÇáÐí íÓÈÈå OIPHNZG  ÇÓÊÝÓÑ ãä ãÞÏã ÇáÑÚÇíÉ ÇáÕÍíÉ ÇáÎÇÕ Èß Úä ãÞÏÇÑ TDFRSUA ÇáÊí íÌÈ Ãä ÊÔÑÈåÇ ßá íæã¡ æÚä PQYMOGG ÇáÃÝÖá ÈÇáäÓÈÉ áß  ßãÇ ÞÏ ÊÍÊÇÌ Åáì ÔÑÈ ãÍáæá OXQNLSP ÇáÌÝÇÝ  íÍÊæí ãÍáæá ãÚÇáÌÉ ÇáÌÝÇÝ Úáì ÇáãÞÇÏíÑ ÇáãäÇÓÈÉ ãä ÇáãíÇå æÇáÃãáÇÍ æÇáÓßÑíÇÊ ÇáÊí ÊÍÊÇÌ ÅáíåÇ áÊÍá ãÍá ÓæÇÆá ÇáÌÓã ÇáãÝÞæÏÉ  íãßäß ÔÑÇÁ KMVKS ãÚÇáÌÉ ÇáÌÝÇÝ ãä ãÚÙã ÇáÕíÏáíÇÊ TMITRY HJFKSPJ  · ÊäÇæá ÇáÃØÚãÉ ÓåáÉ ÇáåÖã  æÊÔãá JTCKFIO Úáì Ðáß ÇáÃÑÒ¡ æÇáÚÏÓ¡ ææÌÈÉ ÇáÍÈæÈ¡ æÇáãæÒ¡ æÇáÈØÇØÓ¡ æÇáÎÈÒ  æÞÏ ÊÊÖãä ÃíÖðÇ ÈÚÖ ÇáÝæÇßå (ÇáãæÒ NPORLZT)¡ æÇáÎÖÑæÇÊ ÌíÏÉ ÇáØåí¡ æÇááÍæã ÇáÎÇáíÉ ãä ÇáÏåæä  ÊÌäÈ ÇáÃØÚãÉ ÇáÛäíÉ JWBGAOWT æÇáÏåæä æÇáÓßÑ  æÊÌäÈ ÃíÖðÇ ÇáßÇÝííä¡ YAASMXD¡ æãäÊÌÇÊ ÇáÃáÈÇä¡ RAVDZQG ÇáÍãÑÇÁ ÍÊì ÊõÔÝì ãä EXKGXHC  ßíÝ íãßä ÇáæÞÇíÉ ãä EIMTDPX ÇáÍÇÏ¿   · ÇÛÓá íÏíß ÏÇÆãðÇ  ÇÓÊÎÏã ÇáãÇÁ æÇáÕÇÈæä  ÇÛÓá íÏíß ÞÈá ÊäÇæá ÇáØÚÇã Ãæ ÅÚÏÇÏå  æÇÛÓá íÏíß ÃíÖðÇ ÈÚÏ ÇÓÊÎÏÇã ÇáÍãÇã  ÇÓÊÚãá Ìá ááíÏíä ÊÚÊãÏ ÊÑßíÈÊå Úáì ÇáßÍæá Ýí ÍÇáÉ ÚÏã ÊæÝÑ ÇáãÇÁ æÇáÕÇÈæä  · ÍÇÝÙ Úáì äÙÇÝÉ ÃÓØÍ ÇáÍãÇã  ÝÅä Ðáß íÓÇÚÏ Úáì ãäÚ ÇäÊÔÇÑ ÇáÌÑÇËíã ÇáÊí ÊÊÓÈÈ Ýí ÇáÅÓåÇá ÇáÍÇÏ  · ÇÛÓá ÇáÎÖÑæÇÊ BBTPUFHW ÌíÏðÇ ÞÈá ÊäÇæáåÇ  ÝÅä Ðáß íÓÇÚÏ Úáì ÇáÊÎáÕ ãä ÇáÌÑÇËíã ÇáÊí ÊÓÈÈ CRZNTXQ  ÊÎáÕ ãä ÞÔÑÉ IMHLWLZ NXUTHGCQZ¡ Åä Ããßä¡ Ãæ Þã ÈØåí ÇáÝæÇßå GLLVZQNPI ÍÊì ÊãÇã ÇáäÖÌ ÞÈá ÊäÇæáåÇ  · Þã ÈØåí QKWMIE ÍÓÈ MGWXEWGCK  ¨ Þã ÈØåí ÇááÍã ÇáãÝÑí  Ýí ÏÑÌÉ ÍÑÇÑÉ ÊÈáÛ 160 ÏÑMAHSA ÝåÑíÊ  ¨ Þã ÈØåí áÍã ÇáÏæÇÌä ÇáãÝÑí¡ Ãæ áÍã ÇáÏæÇÌä ÇáÕÍíÍ¡ Ãæ ÞØÚ ãä áÍã ÇáÏæÇÌä  Ýí ÏÑ ÍÑÇÑÉ áÇ ÊÞá Úä 165 ÏÑMAHSA ÝåÑäåÇíÊ¡ Ëã ÇÑÝÚ ÇááÍã ãä Úáì ÇáäÇÑ  æÇÊÑßå áãÏÉ ËáÇË ÏÞÇÆÞ ÞÈá ÊäÇæáå  ¨ Þã ÈØåí ÇáÞØÚ JRHDHHA ãä TSACLA ÈÎáÇÝ áÍæã ÇáÏæÇÌä  Ýí ÏÑ ÍÑÇÑÉ áÇ ÊÞá Úä 145 ÏÑÌÉ ÝåÑäåÇíÊ¡ Ëã ÇÑÝÚ ÇááÍã ãä Úáì ÇáäÇÑ  æÇÊÑßå áãÏÉ ËáÇË ÏÞÇÆÞ ÞÈá ÊäÇæáå  · ÇÛÓá ÇáÃØÈÇÞ ÇáÊí áÇãÓÊ ÇááÍã ÇáäíÁ ÈÇáãÇÁ ÇáÓÇÎä æÇáÕÇÈæä  æíÔãá Ðáß ÃáæÇÍ ÇáÊÞØíÚ¡ æÃÏæÇÊ ÇáãØÈÎ¡ æÇáÃØÈÇÞ¡ æÂäíÉ ÇáÊÞÏíã  · ÖÚ WWVEKI ÇáäíÆÉ Ãæ ÇáãØåíÉ Ýí ÇáËáÇÌÉ Ýí ÃÓÑÚ æÞÊ ããßä    Ýíãßä Ãä Êäãæ ÇáÈßÊíÑíÇ Ýí CNFNSB OHDTFRKA ÈÏÑÌÉ ÍÑÇÑÉ ÇáÛÑÝÉ áãÏÉ ØæíáÉ  · ÊÌäÈ ÊäÇæá XQSBIJ¡ Ãæ ÇáãÍÇÑ ÇáãáÒãí¡ Ãæ ÈáÍ ÇáÈÍÑ ÚäÏãÇ íßæä äíÁ Ãæ ÛíÑ ÊÇã ÇáäÖÌ  ÝÞÏ Êßæä Êáß HOMJKMDCV ãáæËÉ ÝÊÓÈÈ ÇáÅÕÇÈÉ EIQLOCY  · ÇÔÑÈ ÇáãÇÁ ÇáãÑÔÍ Ãæ MULXAWH ÝÞØ ÃËäÇÁ ÇáÓÝÑ  áÇ ÊÖÚ ÇáËáÌ Ýí ãÔÑæÈÇÊß  ÇÔÑÈ ÇáãíÇå ÇáãÚÈÃÉ ßáãÇ Ããßä Ðáß  ãÊì íÊÚíä Úáíø ØáÈ ÇáÑÚÇíÉ ÇáÝæÑíÉ¿   · ÔÚÑÊ ÈÇáÇÑÊÈÇß  · ßÇä ÞáÈß íÎÝÞ ÈÔßá ÃÓÑÚ ãä ÇáãÚÊÇÏ  · ÊÈÏæ ÚíäÇß ÛÇÆÑÊíä ÈÔÏÉ¡ Ãæ áÇ ÊÝÑÒÇä ÏãæÚðÇ ÚäÏ ÇáÈßÇÁ  · Þá ÊÈæáß Ãæ ÊÍæá áæä Èæáß Åáì áæä ÃÕÝÑ ÛÇãÞ  · æÌÏÊ ÏãðÇ Ãæ ãÎÇØðÇ Ýí ÈÑÇÒß  · Ýí ÍÇáÉ TZRQKXVI ãä Ãáã ÍÇÏ Ýí ÇáÈØä  · áã ÊÊãßä ãä ÊäÇæá Ãí ÓæÇÆá  ãÊì íäÈÛí Úáí ONKYYNZ ÈãÞÏã ÇáÑÚÇíÉ ÇáÕÍíÉ¿   · áÇ íÊÍÓä ÔÚæÑß ÈÇáÃÚÑÇÖ ãÚ ÇáÚáÇÌ  · ßäÊ ÊÚÇäí ãä Íãì ÔÏíÏÉ ÊÒíÏ ÏÑÌÉ ÍÑÇÑÉ ÌÓãß ÝíåÇ Úä 101  3 ÏÑÌÉ ÝåÑäåÇíÊ (38 5 ÏÑÌÉ ãÆæíÉ)  · ßäÊ ÊÚÇäí ãä ãÔßáÇÊ Ýí ÇáÃßá æÇáÔÑÈ ÈÓÈÈ ÇáÞíÁ  · ÔÚÑÊ ÈÚØÔ Ãæ ÌÝÇÝ Ýí ÇáÝã  · áã ÊÔÚÑ ÈÊÍÓä ÈÔÃä ÍÇáÉ OQGVRNI ÇáÊí ÊÚÇäí ãäåÇ ÎáÇá 7 ÃíÇã  · ßÇäÊ áÏíß ESKHRLJGZ Ãæ ãÎÇæÝ CUJZLD ÈÍÇáÊß Ãæ ÈÇáÑÚÇíÉ ÇáÊí ÊÊáÞÇåÇ  ÇÊÝÇÞíÉ ÇáÑÚÇíÉ:   ãä ÍÞß Ãä ÊÓÇÚÏ Ýí ÇáÊÎØíØ ááÑÚÇíÉ ÇáÎÇÕÉ Èß  ÊÚÑÝ Úáì ãÔßáÊß ÇáÕÍíÉ æßíÝ íãßä ÚáÇÌåÇ  äÇÞÔ ÎíÇÑÇÊ ÇáÚáÇÌ ãÚ ãÞÏãí ÇáÑÚÇíÉ áÊÍÏíÏ äæÚíÉ ÇáÑÚÇíÉ ÇáÊí ÊÍÊÇÌ Åáì ÊáÞíåÇ  æãä ÍÞß ÏÇÆãðÇ ÑÝÖ NEFXGL  The above information is an  only  It is not intended as medical advice for individual conditions or treatments  Talk to your doctor, nurse or pharmacist before following any medical regimen to see if it is safe and effective for you  © 2017 [employer]  Information is for End User's use only and may not be sold, redistributed or otherwise used for commercial purposes  All illustrations and images included in CareNotes® are the copyrighted property of A D A M , Inc  or Vahid Jacobo

## 2020-02-20 ENCOUNTER — TELEPHONE (OUTPATIENT)
Dept: UROLOGY | Facility: AMBULATORY SURGERY CENTER | Age: 60
End: 2020-02-20

## 2020-02-20 DIAGNOSIS — E11.65 TYPE 2 DIABETES MELLITUS WITH HYPERGLYCEMIA, WITH LONG-TERM CURRENT USE OF INSULIN (HCC): Primary | ICD-10-CM

## 2020-02-20 DIAGNOSIS — Z79.4 TYPE 2 DIABETES MELLITUS WITH HYPERGLYCEMIA, WITH LONG-TERM CURRENT USE OF INSULIN (HCC): Primary | ICD-10-CM

## 2020-02-20 NOTE — TELEPHONE ENCOUNTER
Called provider service of the patient insurance who is names Francois Barcenas who does not see any type of "special permission/authorization"  She then sent me over to a Eliceo Yarbrough who checked for member services to see if any special provisions were made for the patient  She could not find any type of permission and stated that this was a gap issue  Eliceo Yarbrough then transferred me to line [phone] where a Malcom Sánchez, answered  She informed me that no special auth  Was made and she could not see anything in her records that anything was done  She made mention that only a special instances would be made if the patient was seeking a specialist and no one is in network

## 2020-02-25 ENCOUNTER — TELEPHONE (OUTPATIENT)
Dept: UROLOGY | Facility: MEDICAL CENTER | Age: 60
End: 2020-02-25

## 2020-02-25 NOTE — TELEPHONE ENCOUNTER
Son called for medical release for father  Advised we needed a medical release signed and faxed to [phone]  Son will fax back to office    Son is requesting records sent to Dr Farooq Officer fax number [phone]

## 2020-02-27 NOTE — TELEPHONE ENCOUNTER
Faxed patient's entire record to Dr Iftikhar Encinas office (fax# [phone]) on 2-27-20 35 pages

## 2020-02-29 DIAGNOSIS — F41.9 ANXIETY AND DEPRESSION: ICD-10-CM

## 2020-02-29 DIAGNOSIS — F32.A ANXIETY AND DEPRESSION: ICD-10-CM

## 2020-03-01 RX ORDER — ESCITALOPRAM OXALATE 10 MG/1
10 TABLET ORAL
Qty: 90 TABLET | Refills: 0 | Status: SHIPPED | OUTPATIENT
Start: 2020-03-01 | End: 2020-04-27

## 2020-03-17 ENCOUNTER — TELEPHONE (OUTPATIENT)
Dept: CARDIOLOGY CLINIC | Facility: CLINIC | Age: 60
End: 2020-03-17

## 2020-03-17 NOTE — TELEPHONE ENCOUNTER
Pre  Op  Clearance note- Cardiology    Keya Moore   61 y o   male  1960      Transplant and General Surgery   [phone] (f)    Rneesudheerjae Moore :   Patient's chart was reviewed for preop clearance  Patient was seen in our office on 01/15/2020  Patient has past medical history significant for benign essential hypertension, CKD stage 4, makes hyperlipidemia, diabetic polyneuropathy  Patient is now scheduled for creation of fistula 4/1/2020  Patient has no clinical evidence of heart failure or ischemia or active arrhythmia  Patient's last cardiac workup including echo and stress test reports were reviewed and it shows see attached note  In my opinion patient is in optimum condition for the procedure as planned  Patient is low risk for the surgery as planned  Continue current cardiac medications  Patient can hold  Aspirin for  5-7 days as required for surgery  Patient can hold Eliquis/Xarelto/Pradaxa for 3 days before the procedure  Please restart after the procedure when okay from surgical point of view and advise patient to contact our office  If you have any question please do not hesitate to call us at our office of [facility]  Phone # [phone]  Lab Results   Component Value Date    WBC 7 54 02/19/2020    HGB 11 1 (L) 02/19/2020    HCT 35 0 (L) 02/19/2020    MCV 89 02/19/2020     02/19/2020     Lab Results   Component Value Date    CREATININE 4 27 (H) 02/19/2020     Lab Results   Component Value Date    GLUF 134 (H) 01/11/2020       DR Calloway [employer]   3/17/2020  9:53 AM

## 2020-03-18 ENCOUNTER — OFFICE VISIT (OUTPATIENT)
Dept: CARDIOLOGY CLINIC | Facility: CLINIC | Age: 60
End: 2020-03-18
Payer: COMMERCIAL

## 2020-03-18 VITALS
OXYGEN SATURATION: 98 % | WEIGHT: 233 LBS | HEART RATE: 76 BPM | HEIGHT: 70 IN | BODY MASS INDEX: 33.36 KG/M2 | SYSTOLIC BLOOD PRESSURE: 110 MMHG | DIASTOLIC BLOOD PRESSURE: 62 MMHG

## 2020-03-18 DIAGNOSIS — I12.9 BENIGN HYPERTENSION WITH CKD (CHRONIC KIDNEY DISEASE) STAGE IV (HCC): ICD-10-CM

## 2020-03-18 DIAGNOSIS — I10 ESSENTIAL HYPERTENSION: ICD-10-CM

## 2020-03-18 DIAGNOSIS — E11.65 TYPE 2 DIABETES MELLITUS WITH HYPERGLYCEMIA, WITH LONG-TERM CURRENT USE OF INSULIN (HCC): ICD-10-CM

## 2020-03-18 DIAGNOSIS — N18.4 BENIGN HYPERTENSION WITH CKD (CHRONIC KIDNEY DISEASE) STAGE IV (HCC): ICD-10-CM

## 2020-03-18 DIAGNOSIS — N18.4 STAGE 4 CHRONIC KIDNEY DISEASE (HCC): ICD-10-CM

## 2020-03-18 DIAGNOSIS — E78.2 MIXED HYPERLIPIDEMIA: ICD-10-CM

## 2020-03-18 DIAGNOSIS — I63.312 CEREBROVASCULAR ACCIDENT (CVA) DUE TO THROMBOSIS OF LEFT MIDDLE CEREBRAL ARTERY (HCC): ICD-10-CM

## 2020-03-18 DIAGNOSIS — G45.9 TIA (TRANSIENT ISCHEMIC ATTACK): ICD-10-CM

## 2020-03-18 DIAGNOSIS — Z79.4 TYPE 2 DIABETES MELLITUS WITH HYPERGLYCEMIA, WITH LONG-TERM CURRENT USE OF INSULIN (HCC): ICD-10-CM

## 2020-03-18 DIAGNOSIS — Z01.810 PRE-OPERATIVE CARDIOVASCULAR EXAMINATION: ICD-10-CM

## 2020-03-18 PROCEDURE — 3051F HG A1C>EQUAL 7.0%<8.0%: CPT | Performed by: INTERNAL MEDICINE

## 2020-03-18 PROCEDURE — 1036F TOBACCO NON-USER: CPT | Performed by: INTERNAL MEDICINE

## 2020-03-18 PROCEDURE — 99214 OFFICE O/P EST MOD 30 MIN: CPT | Performed by: INTERNAL MEDICINE

## 2020-03-18 PROCEDURE — 3066F NEPHROPATHY DOC TX: CPT | Performed by: INTERNAL MEDICINE

## 2020-03-18 PROCEDURE — 3008F BODY MASS INDEX DOCD: CPT | Performed by: INTERNAL MEDICINE

## 2020-03-18 PROCEDURE — 3078F DIAST BP <80 MM HG: CPT | Performed by: INTERNAL MEDICINE

## 2020-03-18 PROCEDURE — 93000 ELECTROCARDIOGRAM COMPLETE: CPT | Performed by: INTERNAL MEDICINE

## 2020-03-18 PROCEDURE — 3074F SYST BP LT 130 MM HG: CPT | Performed by: INTERNAL MEDICINE

## 2020-03-18 NOTE — PROGRESS NOTES
Consultation - Cardiology Office  [facility]  Anh Adorno 61 y o  male MRN: [id number]  : 1960  Unit/Bed#:  Encounter: [id number]      Assessment:     1  Essential hypertension    2  Type 2 diabetes mellitus with hyperglycemia, with long-term current use of insulin (HCC)    3  TIA (transient ischemic attack)    4  Cerebrovascular accident (CVA) due to thrombosis of left middle cerebral artery ([facility] [address] )    5  Benign hypertension with CKD (chronic kidney disease) stage IV ([facility])    6  Stage 4 chronic kidney disease ([facility] [address] )    7  Pre-operative cardiovascular examination    8  Mixed hyperlipidemia        Discussion summary and Plan:    1  Preoperative clearance  Patient need cardiology preoperative clearance for   AV fistula  He had abnormal EKG but his nuclear stress test and echo shows normal LV systolic function and no ischemia  There is no clinical evidence of heart failure  I believe he is in optimal condition for the procedure as planned and low risk for the procedure from cardiac point of view  This was discussed with patient patient's wife    2  Exertion shortness of breath  Chronic not changed  Most likely secondary to deconditioning  Patient's result discussed with him  Less likely to be cardiac in origin in view of his nonischemic nuclear and normal LV systolic function  3  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  Patient on medical Rx he has recovered lot of ambulatory function  Has some memory issues  No new symptoms      4  Dyslipidemia  Currently high intensity statin  Continue Lipitor  Follow-up LFTs closely    5  Essential hypertension  Patient blood pressure is well controlled currently amlodipine, Coreg lisinopril  Unfortunately patient's kidney function is getting worse management as per Nephrology  He is scheduled to have AV fistula    6  CKD stage 4  Follow up with Nephrology  Patient regularly follows up with Nephrology  Creatinine has worsened potassium was acceptable last labs were done  Were reviewed  7  Incontinent  Could be due to dysautonomia or neurogenic bladder  May need Botox injections        Thank you for your consultation  If you have any question please call me at [phone]    Counseling :  A description of the counseling  Goals and Barriers  Patient's ability to self care: Yes  Medication side effect reviewed with patient in detail and all their questions answered to their satisfaction  Primary Care Physician t: Diaz Wilde, DO      HPI :     Genesis Santiago is a 61y o  year old male who was was initially seen by us many years ago was recently [facility] with near syncopal episode  Patient has past medical significant for chronic kidney disease, diabetes mellitus, diarrhea, history of stroke who presents with unresponsive episode  Patient was going to flea market in his car with his family, he was not driving  Family noted him to be very sweaty and pale, and became unresponsive  Patient was brought to the emergency department and noted to have blood glucose of 40  Patient is not doing well  He denies any new complaints  His echo from [facility] reviewed  During workup he was found to have CKD stage 3/4  He now follows up with [facility]  He is doing well  His kidney function has stabilized  He does occasionally get short of breath when he walks  After his stroke is not that active  He is having lot of problem with his urine incontinent  He is scheduled to have a procedure done by urology  He may need clearance for that procedure  03/18/2020  Above reviewed  Patient came for follow-up  He is doing well he has no complaint  He has multiple medical problems including CKD stage 4/5, diabetes mellitus, history of stroke, history of abnormal EKG with T-wave inversions in inferior leads who came for preoperative clearance    Patient also had a preoperative clearance earlier in January for his Botox injection he did well  He had a stress test done 10/14/2019 which was nonischemic EF around 50%  He now scheduled to have fistula placed  No other significant issues at this time  He had an episode of hypoglycemia and his medications were adjusted  No nausea no vomiting no other complaint  He came with his wife who provided additional information  Last creatinine was 4 27  Labs from February 19 reviewed  Review of Systems   Constitutional: Negative for activity change, chills, diaphoresis, fever and unexpected weight change  HENT: Negative for congestion  Eyes: Negative for discharge and redness  Respiratory: Negative for cough, chest tightness, shortness of breath and wheezing  Cardiovascular: Negative  Negative for chest pain, palpitations and leg swelling  History of CVA   Gastrointestinal: Negative for abdominal pain, diarrhea and nausea  Endocrine: Negative  Genitourinary: Negative for decreased urine volume and urgency  CKD stage 4/5 need AV fistula   Musculoskeletal: Positive for arthralgias  Negative for back pain and gait problem  Skin: Negative for rash and wound  Allergic/Immunologic: Negative  Neurological: Negative for dizziness, seizures, syncope, weakness, light-headedness and headaches  Hematological: Negative  Psychiatric/Behavioral: Negative for agitation and confusion  The patient is nervous/anxious          Historical Information   Past Medical History:   Diagnosis Date    Chronic kidney disease     Diabetes mellitus ([facility] [address] )     GERD (gastroesophageal reflux disease)     Hypercholesteremia     Hyperlipidemia     Hypertension     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    Neuropathy     Obesity     Osteomyelitis ([facility] [address] )     last assessed 11/4/16    PVC's (premature ventricular contractions)     sees cardiology Dr Lexi camargo    Stroke [facility])     last weeof July 2018 [address] [facility] Past Surgical History:   Procedure Laterality Date    ABDOMINAL SURGERY      CHOLECYSTECTOMY      Percutaneous    COLONOSCOPY      CYSTOSCOPY      OTHER SURGICAL HISTORY      "stimulator to control bowel movements"    [place] ESOPHAGOGASTRODUODENOSCOPY TRANSORAL DIAGNOSTIC N/A 9/27/2016    Procedure: ESOPHAGOGASTRODUODENOSCOPY (EGD); Surgeon: Avery Lopez MD;  [facility]: [facility];   Service: Gastroenterology    [place] LAP,CHOLECYSTECTOMY N/A 2/29/2016    Procedure: LAPAROSCOPIC CHOLECYSTECTOMY ;  Surgeon: Frankey Smalling, DO;  [facility];  Service: General    ROTATOR CUFF REPAIR Right     TOE AMPUTATION Right 10/28/2016    Procedure: 3RD TOE AMPUTATION ;  Surgeon: Feliz Iqbal DPM;  [facility];  Service:      Social History     Substance and Sexual Activity   Alcohol Use No    Frequency: Never    Drinks per session: Patient refused    Binge frequency: Never     Social History     Substance and Sexual Activity   Drug Use No     Social History     Tobacco Use   Smoking Status Never Smoker   Smokeless Tobacco Never Used     Family History:   Family History   Problem Relation Age of Onset    Leukemia Mother     Liver disease Mother     Lung cancer Mother         heavy smoker - 3 ppd    Heart disease Father     Liver disease Father     Multiple myeloma Sister     Breast cancer Sister     Urolithiasis Family     Alcohol abuse Neg Hx     Depression Neg Hx     Drug abuse Neg Hx     Substance Abuse Neg Hx     Mental illness Neg Hx        Meds/Allergies     No Known Allergies    Current Outpatient Medications:     amLODIPine (NORVASC) 5 mg tablet, Take 1 tablet (5 mg total) by mouth every 12 (twelve) hours, Disp: 180 tablet, Rfl: 1    aspirin (ECOTRIN LOW STRENGTH) 81 mg EC tablet, Take 81 mg by mouth daily Resume on 8/14, Disp: , Rfl:     atorvastatin (LIPITOR) 80 mg tablet, Take 1 tablet (80 mg total) by mouth daily with dinner, Disp: 90 tablet, Rfl: 1    Blood Glucose Monitoring Suppl (TRUE METRIX METER) w/Device KIT, Use to test blood sugars 3 times daily, Disp: 1 kit, Rfl: 0    Blood Pressure Monitoring (BLOOD PRESSURE CUFF) MISC, Use to check blood pressure before taking blood pressure medication and 1 hour after and follow instructions provided in discharge instructions based on the readings  , Disp: 1 each, Rfl: 0    carvedilol (COREG) 6 25 mg tablet, Take 1 tablet (6 25 mg total) by mouth 2 (two) times a day with meals, Disp: 180 tablet, Rfl: 1    Cholecalciferol (VITAMIN D3) 31701 units CAPS, Take 1 capsule (50,000 Units total) by mouth once a week, Disp: 5 capsule, Rfl: 5    Continuous Blood Gluc  (DEXCOM G6 ) LANCE, Use as directed for continuous glucose monitoring, Disp: 1 Device, Rfl: 0    Continuous Blood Gluc Sensor (DEXCOM G6 SENSOR) MISC, Use as directed for continuous glucose monitoring  Change every 10 days, Disp: 1 each, Rfl: 11    Continuous Blood Gluc Transmit (DEXCOM G6 TRANSMITTER) MISC, Use as directed for continuous glucose monitoring-Change every 3 months, Disp: 1 each, Rfl: 3    CVS VITAMIN B-12 1000 MCG tablet, TAKE 1 TABLET BY MOUTH EVERY DAY, Disp: 90 tablet, Rfl: 1    famotidine (PEPCID) 20 mg tablet, Take 20 mg by mouth daily Resume on 8/14, Disp: , Rfl:     glucagon (GLUCAGON EMERGENCY) 1 MG injection, Inject 1 mg under the skin once as needed for low blood sugar for up to 1 dose, Disp: 1 kit, Rfl: 2    glucose 4 g chewable tablet, Chew 3 tablets (12 g total) as needed for low blood sugar, Disp: 50 tablet, Rfl: 0    Incontinence Supplies (MALE URINAL) MISC, by Does not apply route daily, Disp: 6 each, Rfl: 3    insulin aspart (NOVOLOG) 100 units/mL injection, Inject 4 units 3 times a day with meals plus scale  Use up to 25 units daily  , Disp: 20 mL, Rfl: 0    insulin glargine (Basaglar KwikPen) 100 units/mL injection pen, Inject 20 Units under the skin daily, Disp: 5 pen, Rfl: 3    Insulin Pen Needle (BD Pen Needle Adina U/F) 32G X 4 MM MISC, Use to inject insulin daily, Disp: 100 each, Rfl: 1    Insulin Syringe-Needle U-100 (B-D INS SYRINGE 0 5CC/30GX1/2") 30G X 1/2" 0 5 ML MISC, Inject under the skin 4 (four) times a day, Disp: 360 each, Rfl: 1    ONETOUCH DELICA LANCETS 50I MISC, by Does not apply route 3 (three) times a day, Disp: 270 each, Rfl: 1    escitalopram (LEXAPRO) 10 mg tablet, TAKE 1 TABLET (10 MG TOTAL) BY MOUTH DAILY AT BEDTIME (Patient not taking: Reported on 3/18/2020), Disp: 90 tablet, Rfl: 0    gabapentin (NEURONTIN) 250 mg/5 mL solution, Take 12 mL (600 mg total) by mouth daily at bedtime (Patient not taking: Reported on 3/18/2020), Disp: 470 mL, Rfl: 2    glucose blood test strip, Use to test blood sugars 3 times daily, Disp: 300 each, Rfl: 1    lidocaine (LMX) 4 % cream, Apply topically 2 (two) times a day As needed to affected area (Patient not taking: Reported on 3/18/2020), Disp: 45 g, Rfl: 0    Vitals: Blood pressure 110/62, pulse 76, height 5' 10" (1 778 m), weight 106 kg (233 lb), SpO2 98 %  Body mass index is 33 43 kg/m²  Vitals:    03/18/20 1251   Weight: 106 kg (233 lb)     BP Readings from Last 3 Encounters:   03/18/20 110/62   02/19/20 127/64   02/18/20 141/80         Physical Exam   Constitutional: He is oriented to person, place, and time  He appears well-developed and well-nourished  No distress  HENT:   Head: Normocephalic and atraumatic  Eyes: Pupils are equal, round, and reactive to light  Neck: Neck supple  No JVD present  No tracheal deviation present  No thyromegaly present  Cardiovascular: Normal rate, regular rhythm, S1 normal and S2 normal  Exam reveals no gallop, no S3, no S4, no distant heart sounds and no friction rub  Murmur heard  Systolic (ejection) murmur is present with a grade of 2/6  Pulmonary/Chest: Effort normal and breath sounds normal  No respiratory distress  He has no wheezes  He has no rales  He exhibits no tenderness  Abdominal: Soft   Bowel sounds are normal  He exhibits no distension  There is no tenderness  Musculoskeletal: He exhibits no edema or deformity  Neurological: He is alert and oriented to person, place, and time  Skin: Skin is warm and dry  No rash noted  He is not diaphoretic  No pallor  Psychiatric: He has a normal mood and affect  His behavior is normal  Judgment normal          Diagnostic Studies Review Cardio:  Nuclear stress test   Nuclear stress test 10/14/2019 were nonischemic EF around 50%  Echo Doppler  Echo Doppler 10/29/2018 is below  EKG:  EKG today shows normal sinus rhythm nonspecific ST changes heart rate 80 beats per minute  Twelve lead EKG 01/15/2020 shows normal sinus rhythm LVH by voltage  ST abnormality in inferior leads certainly cannot rule ischemia but had a nonischemic nuclear  Twelve lead EKG 2021 shows normal sinus rhythm heart rate 76 beats per minute nonspecific ST changes in inferior lead no change from old EKG  Cardiac testing:   Results for orders placed during the hospital encounter of 10/28/18   Echo complete with contrast if indicated    Narrative [place] [address] [address] [place] [address]  [phone]    Transthoracic Echocardiogram  2D, M-mode, Doppler, and Color Doppler    Study date:  29-Oct-2018    Patient: Ole Landon  MR number: [id number]  Account number: [de-identified]  : 1960  Age: 62 years  Gender: Male  Status: Inpatient  Location: Bedside  Height: 68 in  Weight: 232 5 lb  BP: 142/ 76 mmHg    Indications: TIA    Diagnoses: G45 9 - Transient cerebral ischemic attack, unspecified    Sonographer:  Ricco Marcial RDCS  Primary Physician:  Afshin Roblero DO  Referring Physician:  Beba Schmitt PA-C  Group:  [facility]  Interpreting Physician:  Desiree Mittal MD    SUMMARY    LEFT VENTRICLE:  Systolic function was normal  Ejection fraction was estimated to be 55 %    Although no diagnostic regional wall motion abnormality was identified, this possibility cannot be completely excluded on the basis of this study  Wall thickness was increased  Doppler parameters were consistent with abnormal left ventricular relaxation (grade 1 diastolic dysfunction)  AORTIC VALVE:  The valve was functionally bicuspid  Leaflets exhibited moderately increased thickness, moderate calcification, and moderately reduced cuspal separation  The noncoronary cusp appears restricted  There was mild regurgitation  HISTORY: PRIOR HISTORY: DM, Hypertension, Hyperlipidemia, Obesity, PVC's, Stroke    PROCEDURE: The procedure was performed at the bedside  This was a routine study  The transthoracic approach was used  The study included complete 2D imaging, M-mode, complete spectral Doppler, and color Doppler  The heart rate was 72 bpm,  at the start of the study  Images were obtained from the parasternal, apical, subcostal, and suprasternal notch acoustic windows  Echocardiographic views were limited due to decreased penetration and lung interference  This was a  technically difficult study  LEFT VENTRICLE: Size was normal  Systolic function was normal  Ejection fraction was estimated to be 55 %  Although no diagnostic regional wall motion abnormality was identified, this possibility cannot be completely excluded on the basis  of this study  Wall thickness was increased  DOPPLER: There was an increased relative contribution of atrial contraction to ventricular filling  Doppler parameters were consistent with abnormal left ventricular relaxation (grade 1  diastolic dysfunction)  RIGHT VENTRICLE: The size was normal  Systolic function was normal     LEFT ATRIUM: The atrium was mildly dilated  RIGHT ATRIUM: Size was normal     MITRAL VALVE: There was mild to moderate annular calcification  Valve structure was normal  There was mild calcification  There was normal leaflet separation  DOPPLER: There was no evidence for stenosis   There was no significant  regurgitation  AORTIC VALVE: The valve was functionally bicuspid  Leaflets exhibited moderately increased thickness, moderate calcification, and moderately reduced cuspal separation  The noncoronary cusp appears restricted  DOPPLER: There was no evidence  for stenosis  There was mild regurgitation  TRICUSPID VALVE: The valve structure was normal  There was normal leaflet separation  DOPPLER: There was no evidence for stenosis  There was no significant regurgitation  PULMONIC VALVE: Leaflets exhibited normal thickness, no calcification, and normal cuspal separation  DOPPLER: The transpulmonic velocity was within the normal range  There was mild regurgitation  PERICARDIUM: There was no pericardial effusion  The pericardium was normal in appearance  AORTA: The root exhibited normal size  SYSTEMIC VEINS: IVC: The inferior vena cava was normal in size and course  Respirophasic changes were normal     SYSTEM MEASUREMENT TABLES    2D  %FS: 24 %  AV Diam: 3 33 cm  EDV(Teich): 140 07 ml  EF Biplane: 58 38 %  EF(Cube): 56 1 %  EF(Teich): 47 36 %  ESV(Cube): 68 34 ml  ESV(Teich): 73 73 ml  IVSd: 1 24 cm  LA Area: 21 11 cm2  LA Diam: 3 95 cm  LVEDV MOD A2C: 135 47 ml  LVEDV MOD A4C: 174 64 ml  LVEDV MOD BP: 157 66 ml  LVEF MOD A2C: 59 41 %  LVEF MOD A4C: 56 77 %  LVESV MOD A2C: 54 99 ml  LVESV MOD A4C: 75 49 ml  LVESV MOD BP: 65 62 ml  LVIDd: 5 38 cm  LVIDs: 4 09 cm  LVLd A2C: 8 91 cm  LVLd A4C: 9 42 cm  LVLs A2C: 8 03 cm  LVLs A4C: 7 66 cm  LVOT Diam: 2 08 cm  LVPWd: 1 26 cm  RA Area: 18 12 cm2  RV Diam: 3 33 cm  SI(Cube): 40 06 ml/m2  SI(Teich): 30 43 ml/m2  SV MOD A2C: 80 48 ml  SV MOD A4C: 99 15 ml  SV(Cube): 87 33 ml  SV(Teich): 66 34 ml    CW  AR Dec Brooks: 1 95 m/s2  AR Dec Time: 2036 6 ms  AR PHT: 590 61 ms  AR Vmax: 3 97 m/s  AR maxP 14 mmHg  AV Env  Ti: 310 54 ms  AV MaxPG: 15 41 mmHg  AV SI: 172 89 ml/m2  AV SV: 376 91 ml  AV VTI: 43 31 cm  AV Vmax: 1 96 m/s  AV Vmean: 1 39 m/s  AV meanP 41 mmHg    MM  TAPSE: 1 75 cm    PW  E': 0 07 m/s  E/E': 10 46  MV A Carlin: 0 93 m/s  MV Dec Payne: 3 63 m/s2  MV DecT: 190 71 ms  MV E Carlin: 0 69 m/s  MV E/A Ratio: 0 75    [employer] Accredited Echocardi[facility]    Prepared and electronically signed by    Caleb Cogan, MD  Signed 29-Oct-2018 13:16:25         Lab Review   Lab Results   Component Value Date    WBC 7 54 2020    HGB 11 1 (L) 2020    HCT 35 0 (L) 2020    MCV 89 2020    RDW 13 0 2020     2020     BMP:  Lab Results   Component Value Date    SODIUM 142 2020    K 4 9 2020     2020    CO2 22 2020    ANIONGAP 9 2016    BUN 74 (H) 2020    CREATININE 4 27 (H) 2020    GLUC 93 2020    GLUF 134 (H) 2020    CALCIUM 8 3 2020    EGFR 14 2020    MG 2 0 2019     LFT:  Lab Results   Component Value Date    AST 11 2020    ALT 27 2020    ALKPHOS 132 (H) 2020    TP 7 2 2020    ALB 3 1 (L) 2020      Lab Results   Component Value Date    TUW6UGAQOPJR 1 946 2018     Lab Results   Component Value Date    HGBA1C 6 9 (H) 2020     Lipid Profile:   Lab Results   Component Value Date    CHOLESTEROL 80 2020    HDL 31 (L) 2020    LDLCALC 20 2020    TRIG 143 2020     Lab Results   Component Value Date    CHOLESTEROL 80 2020    CHOLESTEROL 70 10/29/2018     Lab Results   Component Value Date    TROPONINI <0 02 2019     No results found for: NTBNP         Dr Lin Pabon MD [facility]      "This note has been constructed using a voice recognition system  Therefore there may be syntax, spelling, and/or grammatical errors   Please call if you have any questions  "

## 2020-03-26 ENCOUNTER — TELEMEDICINE (OUTPATIENT)
Dept: DIABETES SERVICES | Facility: CLINIC | Age: 60
End: 2020-03-26

## 2020-03-26 DIAGNOSIS — Z79.4 TYPE 2 DIABETES MELLITUS WITH HYPERGLYCEMIA, WITH LONG-TERM CURRENT USE OF INSULIN (HCC): Primary | ICD-10-CM

## 2020-03-26 DIAGNOSIS — E11.65 TYPE 2 DIABETES MELLITUS WITH HYPERGLYCEMIA, WITH LONG-TERM CURRENT USE OF INSULIN (HCC): Primary | ICD-10-CM

## 2020-03-26 NOTE — PROGRESS NOTES
Virtual Regular Visit    Problem List Items Addressed This Visit     None               Reason for visit is Follow-up Medical Nutrition Therapy appointment  E11 65, Z79 4 (ICD-10-CM) - Type 2 diabetes mellitus with hyperglycemia, with long-term current use of insulin  Encounter provider Phill Ervin RD    Provider located at [address] [address]      Recent Visits  No visits were found meeting these conditions  Showing recent visits within past 7 days and meeting all other requirements     Today's Visits  Date Type Provider Dept   03/26/20 Telemedicine Umm Demarco RD  [facility]   Showing today's visits and meeting all other requirements     Future Appointments  No visits were found meeting these conditions  Showing future appointments within next 150 days and meeting all other requirements        After connecting through telephone, the patient was identified by name and date of birth  Charlene Morales was informed that this is a telemedicine visit and that the visit is being conducted through telephone which may not be secure and therefore, might not be HIPAA-compliant  My office door was closed  No one else was in the room  He acknowledged consent and understanding of privacy and security of the video platform  The patient has agreed to participate and understands they can discontinue the visit at any time  Subjective  Charlene Morales is a 61 y o  male with type 2 diabetes and Stage 5 CKD  [facility]       Past Medical History:   Diagnosis Date    Chronic kidney disease     Diabetes mellitus ([facility] [address] )     GERD (gastroesophageal reflux disease)     Hypercholesteremia     Hyperlipidemia     Hypertension     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    Neuropathy     Obesity     Osteomyelitis ([facility] [address] )     last assessed 11/4/16    PVC's (premature ventricular contractions)     sees cardiology Dr Patrick Baxter mary jo    Stroke [facility])     last weeof July 2018 [facility]       Past Surgical History:   Procedure Laterality Date    ABDOMINAL SURGERY      CHOLECYSTECTOMY      Percutaneous    COLONOSCOPY      CYSTOSCOPY      OTHER SURGICAL HISTORY      "stimulator to control bowel movements"    MT ESOPHAGOGASTRODUODENOSCOPY TRANSORAL DIAGNOSTIC N/A 9/27/2016    Procedure: ESOPHAGOGASTRODUODENOSCOPY (EGD); Surgeon: Davin Null MD;  [facility]: [facility]; Service: Gastroenterology    MT LAP,CHOLECYSTECTOMY N/A 2/29/2016    Procedure: LAPAROSCOPIC CHOLECYSTECTOMY ;  Surgeon: Rukhsana Roblero DO;  [facility];  Service: General    ROTATOR CUFF REPAIR Right     TOE AMPUTATION Right 10/28/2016    Procedure: 3RD TOE AMPUTATION ;  Surgeon: Bishop Roxanne DPM;  [facility];  Service:        Current Outpatient Medications   Medication Sig Dispense Refill    amLODIPine (NORVASC) 5 mg tablet Take 1 tablet (5 mg total) by mouth every 12 (twelve) hours 180 tablet 1    aspirin (ECOTRIN LOW STRENGTH) 81 mg EC tablet Take 81 mg by mouth daily Resume on 8/14      atorvastatin (LIPITOR) 80 mg tablet Take 1 tablet (80 mg total) by mouth daily with dinner 90 tablet 1    Blood Glucose Monitoring Suppl (TRUE METRIX METER) w/Device KIT Use to test blood sugars 3 times daily 1 kit 0    Blood Pressure Monitoring (BLOOD PRESSURE CUFF) MISC Use to check blood pressure before taking blood pressure medication and 1 hour after and follow instructions provided in discharge instructions based on the readings   1 each 0    carvedilol (COREG) 6 25 mg tablet Take 1 tablet (6 25 mg total) by mouth 2 (two) times a day with meals 180 tablet 1    Cholecalciferol (VITAMIN D3) 26914 units CAPS Take 1 capsule (50,000 Units total) by mouth once a week 5 capsule 5    Continuous Blood Gluc  (DEXCOM G6 ) LANCE Use as directed for continuous glucose monitoring 1 Device 0    Continuous Blood Gluc Sensor (DEXCOM G6 SENSOR) MISC Use as directed for continuous glucose monitoring  Change every 10 days 1 each 11    Continuous Blood Gluc Transmit (DEXCOM G6 TRANSMITTER) MISC Use as directed for continuous glucose monitoring-Change every 3 months 1 each 3    CVS VITAMIN B-12 1000 MCG tablet TAKE 1 TABLET BY MOUTH EVERY DAY 90 tablet 1    escitalopram (LEXAPRO) 10 mg tablet TAKE 1 TABLET (10 MG TOTAL) BY MOUTH DAILY AT BEDTIME (Patient not taking: Reported on 3/18/2020) 90 tablet 0    famotidine (PEPCID) 20 mg tablet Take 20 mg by mouth daily Resume on 8/14      gabapentin (NEURONTIN) 250 mg/5 mL solution Take 12 mL (600 mg total) by mouth daily at bedtime (Patient not taking: Reported on 3/18/2020) 470 mL 2    glucagon (GLUCAGON EMERGENCY) 1 MG injection Inject 1 mg under the skin once as needed for low blood sugar for up to 1 dose 1 kit 2    glucose 4 g chewable tablet Chew 3 tablets (12 g total) as needed for low blood sugar 50 tablet 0    glucose blood test strip Use to test blood sugars 3 times daily 300 each 1    Incontinence Supplies (MALE URINAL) MISC by Does not apply route daily 6 each 3    insulin aspart (NOVOLOG) 100 units/mL injection Inject 4 units 3 times a day with meals plus scale  Use up to 25 units daily  20 mL 0    insulin glargine (Basaglar KwikPen) 100 units/mL injection pen Inject 20 Units under the skin daily 5 pen 3    Insulin Pen Needle (BD Pen Needle Adina U/F) 32G X 4 MM MISC Use to inject insulin daily 100 each 1    Insulin Syringe-Needle U-100 (B-D INS SYRINGE 0 5CC/30GX1/2") 30G X 1/2" 0 5 ML MISC Inject under the skin 4 (four) times a day 360 each 1    lidocaine (LMX) 4 % cream Apply topically 2 (two) times a day As needed to affected area (Patient not taking: Reported on 3/18/2020) 45 g 0    ONETOUCH DELICA LANCETS 48D MISC by Does not apply route 3 (three) times a day 270 each 1     No current facility-administered medications for this visit           No Known Allergies    Review of Systems    Physical Exam     I spent 30 minutes with the patient during this visit  Medical Nutrition Therapy      Assessment    Chief complaint T2DM    Visit Type: Follow-up visit    HPI: Spoke with Toni Rosen and his son via telephone call for follow-up today  Most recent HbA1c has improved to 6 9% from previously 7 7%  Asad's son reports preprandial BG range of 123-180 mg/dL  Since Asad's last visit his CKD diagnosis has worsened from stage 4 to stage 5  His son reports that Toni Rosen is trying to reduce weight to 225 lb in order to be eligible for a kidney transplant and may also start dialysis in the meantime  [facility] food record reveals inadequate carbohydrate intake  Overall, [facility] meals provide 15-45 grams of carbohydrate per meal   Asad's breakfast ranges 0-26 g of carbohydrate depending on whether he has cereal in addition to his eggs  Lunch example contained 45 g of carbohydrate, and dinner example 30 g of carbohydrate  Based on meal plan review and diet recall performed today, provided education about the importance of consistent carbohydrate intake in helping to control blood glucose  Discussed with Toni Rosen and his son that since Toni Rosen is on a fast acting insulin, NovoLog, that not having adequate carbohydrate and meals increases the chance of hypoglycemia  Reviewed recommendation of 45-60 g of carbohydrate per meal, per Asad's initial meal plan, and placed emphasis on meeting the minimum 45 g per meal amount  It was also revealed during conversation that Toni Rosen son is giving Toni Rosen his NovoLog after meals rather than 10-15 minutes prior to the meals  Discussed action, onset, peak, and duration of NovoLog and reviewed 15/15 rule for treatment of hypoglycemia  Discussed meal planning and examples of how to modify meals to better meet recommended carbohydrate needs    Examples included adding a serving of high-fiber, low potassium fruit at meals such as breakfast and lunch were carbohydrate intake is inadequate  Reviewed which foods are carbohydrates and which foods are not count his carbohydrates  Also discussed limiting high phosphorus foods such as yogurt and milk  Kvng Perry and his verbalized good understanding of recommendations and topics discussed today and will call with any questions prior to next follow-up appointment in 6 months  Ht Readings from Last 1 Encounters:   03/18/20 5' 10" (1 778 m)     Wt Readings from Last 2 Encounters:   03/18/20 106 kg (233 lb)   02/19/20 106 kg (234 lb)     Weight Change: Yes down to 231 lbs  Medical Diagnosis/reason for visit  E11 65, Z79 4 (ICD-10-CM) - Type 2 diabetes mellitus with hyperglycemia, with long-term current use of insulin  Food Log: Completed via the method of food recall                 Breakfast:8 am  Eggs (2) and coffee with milk (whole), sometimes will also have a bowl of cheerios 1 cup, whole milk (in addition to eggs)  Morning Snack: 10 - 11 am  apple and an orange   Lunch:12:30 pm  Baked tilapia with 1 cup rice (white) and a bowl and a half of salad (lettuce, tomato, onion, cucumber) Pastor dressing  Water  Afternoon Snack: 2 pm  Crackers 10 - 15 club  Water  Dinner:4 - 5 pm  Chicken breast baked, mashed potatoes 1 cup and a bowl of salad  Water  Evening Snack:7-8 pm 1 cup of Fruit, grapes or watermelon or strawberries    Beverages: water and coffee (2 cups)  Eating out/Take out:none, all meals prepared at home    Exercise inside the house walking around about 10 minutes every hour      Calorie needs 1800 kcals/day Carbs: 45 - 60 g/meal, 15g/snack     Fat: 50 g/day    Protein:74 g/day    Nutrition Diagnosis:  Inadequate carbohydrate intake  related to Food and nutrition related knowledge deficit concerning appropriate amount of dietary carbohydrate as evidenced by  Estimated carbohydrate intake less than recommended amounts    Intervention: carbohydrate counting, meal planning and food diary     Treatment Goals: Patient understands education and recommendations, Patient will monitor food intake daily with tracker and Patient will count carbohydrates    Monitoring and evaluation:    Term code indicator  FH 1 6 3 Carbohydrate Intake Criteria: 45 - 6 g of carbohydrate per meal, 15 g of carbohydrate per snack it  Term code indicator  FH 4 4 Mealtime Behavior Criteria: Three meals per day, 4-5 hours apart  Three snacks per day, at least 2 hours apart from meals  Patients Response to Instruction:  Comprehensiongood  Motivationgood  Expected Compliancegood     Start- Stop: 1:00 p m -1:30 p m  Total Minutes: 30 Minutes  Group or Individual Instruction: MNT - I - FU  Other: Lissette Brennan PA Casandra C    Thank you for coming to the [address] [place] [address] for education today  Please feel free to call with any questions or concerns      Richard Mabry, Mario S[address]  [address] [facility] [id number]

## 2020-03-26 NOTE — PATIENT INSTRUCTIONS
45 - 60 grams of carbohydrate per meal  15 grams of carbohydrate per snack  3 meals per day, 4 - 5 hours apart  3 snacks per day, at least 2 hours after meals  Novolog should be taken 10-15 minutes before the start of meals  Incorporate a serving of high fiber, low potassium fruit at meals where carbohydrate intake is inadequate as discussed  Please complete a 3 day food record prior to next scheduled follow-up appointment in 6 months and bring with you to that appointment

## 2020-04-01 DIAGNOSIS — I10 ESSENTIAL HYPERTENSION: Chronic | ICD-10-CM

## 2020-04-01 DIAGNOSIS — E53.8 B12 DEFICIENCY: ICD-10-CM

## 2020-04-01 RX ORDER — CARVEDILOL 6.25 MG/1
6.25 TABLET ORAL 2 TIMES DAILY WITH MEALS
Qty: 180 TABLET | Refills: 1 | Status: SHIPPED | OUTPATIENT
Start: 2020-04-01 | End: 2020-10-26

## 2020-04-07 ENCOUNTER — TELEPHONE (OUTPATIENT)
Dept: ENDOCRINOLOGY | Facility: CLINIC | Age: 60
End: 2020-04-07

## 2020-04-15 DIAGNOSIS — Z79.4 TYPE 2 DIABETES MELLITUS WITH HYPERGLYCEMIA, WITH LONG-TERM CURRENT USE OF INSULIN (HCC): ICD-10-CM

## 2020-04-15 DIAGNOSIS — E11.65 TYPE 2 DIABETES MELLITUS WITH HYPERGLYCEMIA, WITH LONG-TERM CURRENT USE OF INSULIN (HCC): ICD-10-CM

## 2020-04-27 ENCOUNTER — TELEMEDICINE (OUTPATIENT)
Dept: INTERNAL MEDICINE CLINIC | Facility: CLINIC | Age: 60
End: 2020-04-27
Payer: COMMERCIAL

## 2020-04-27 DIAGNOSIS — N18.5 STAGE 5 CHRONIC KIDNEY DISEASE (HCC): Primary | ICD-10-CM

## 2020-04-27 DIAGNOSIS — Z79.4 TYPE 2 DIABETES MELLITUS WITH HYPERGLYCEMIA, WITH LONG-TERM CURRENT USE OF INSULIN (HCC): ICD-10-CM

## 2020-04-27 DIAGNOSIS — E11.65 TYPE 2 DIABETES MELLITUS WITH HYPERGLYCEMIA, WITH LONG-TERM CURRENT USE OF INSULIN (HCC): ICD-10-CM

## 2020-04-27 DIAGNOSIS — Z98.890 S/P ARTERIOVENOUS (AV) FISTULA CREATION: ICD-10-CM

## 2020-04-27 DIAGNOSIS — Z76.82 PRE-KIDNEY TRANSPLANT, LISTED: ICD-10-CM

## 2020-04-27 PROBLEM — R80.9 NEPHROTIC RANGE PROTEINURIA: Status: ACTIVE | Noted: 2018-03-28

## 2020-04-27 PROBLEM — T68.XXXA HYPOTHERMIA: Status: RESOLVED | Noted: 2019-08-24 | Resolved: 2020-04-27

## 2020-04-27 PROCEDURE — 99441 PR PHYS/QHP TELEPHONE EVALUATION 5-10 MIN: CPT | Performed by: INTERNAL MEDICINE

## 2020-05-04 DIAGNOSIS — Z79.4 TYPE 2 DIABETES MELLITUS WITH HYPERGLYCEMIA, WITH LONG-TERM CURRENT USE OF INSULIN (HCC): ICD-10-CM

## 2020-05-04 DIAGNOSIS — E11.65 TYPE 2 DIABETES MELLITUS WITH HYPERGLYCEMIA, WITH LONG-TERM CURRENT USE OF INSULIN (HCC): ICD-10-CM

## 2020-05-19 ENCOUNTER — TELEPHONE (OUTPATIENT)
Dept: NEUROLOGY | Facility: CLINIC | Age: 60
End: 2020-05-19

## 2020-05-19 DIAGNOSIS — Z79.4 TYPE 2 DIABETES MELLITUS WITH HYPERGLYCEMIA, WITH LONG-TERM CURRENT USE OF INSULIN (HCC): ICD-10-CM

## 2020-05-19 DIAGNOSIS — E11.65 TYPE 2 DIABETES MELLITUS WITH HYPERGLYCEMIA, WITH LONG-TERM CURRENT USE OF INSULIN (HCC): ICD-10-CM

## 2020-05-21 ENCOUNTER — TELEPHONE (OUTPATIENT)
Dept: ENDOCRINOLOGY | Facility: CLINIC | Age: 60
End: 2020-05-21

## 2020-05-21 ENCOUNTER — TELEMEDICINE (OUTPATIENT)
Dept: ENDOCRINOLOGY | Facility: CLINIC | Age: 60
End: 2020-05-21
Payer: COMMERCIAL

## 2020-05-21 DIAGNOSIS — N18.4 BENIGN HYPERTENSION WITH CKD (CHRONIC KIDNEY DISEASE) STAGE IV (HCC): Primary | ICD-10-CM

## 2020-05-21 DIAGNOSIS — E78.2 MIXED HYPERLIPIDEMIA: ICD-10-CM

## 2020-05-21 DIAGNOSIS — I12.9 BENIGN HYPERTENSION WITH CKD (CHRONIC KIDNEY DISEASE) STAGE IV (HCC): Primary | ICD-10-CM

## 2020-05-21 DIAGNOSIS — Z79.4 TYPE 2 DIABETES MELLITUS WITH HYPERGLYCEMIA, WITH LONG-TERM CURRENT USE OF INSULIN (HCC): ICD-10-CM

## 2020-05-21 DIAGNOSIS — E11.65 TYPE 2 DIABETES MELLITUS WITH HYPERGLYCEMIA, WITH LONG-TERM CURRENT USE OF INSULIN (HCC): ICD-10-CM

## 2020-05-21 PROCEDURE — 99214 OFFICE O/P EST MOD 30 MIN: CPT | Performed by: PHYSICIAN ASSISTANT

## 2020-05-22 ENCOUNTER — DOCTOR'S OFFICE (OUTPATIENT)
Dept: URBAN - METROPOLITAN AREA CLINIC 137 | Facility: CLINIC | Age: 60
Setting detail: OPHTHALMOLOGY
End: 2020-05-22
Payer: COMMERCIAL

## 2020-05-22 DIAGNOSIS — H25.13: ICD-10-CM

## 2020-05-22 DIAGNOSIS — E10.3593: ICD-10-CM

## 2020-05-22 PROCEDURE — 92134 CPTRZ OPH DX IMG PST SGM RTA: CPT | Performed by: OPHTHALMOLOGY

## 2020-05-22 PROCEDURE — 92004 COMPRE OPH EXAM NEW PT 1/>: CPT | Performed by: OPHTHALMOLOGY

## 2020-05-22 ASSESSMENT — CONFRONTATIONAL VISUAL FIELD TEST (CVF)
OD_FINDINGS: FULL
OS_FINDINGS: FULL

## 2020-05-26 PROBLEM — E10.3511: Status: ACTIVE | Noted: 2020-05-22

## 2020-05-26 PROBLEM — E10.3592: Status: ACTIVE | Noted: 2020-05-22

## 2020-05-26 ASSESSMENT — REFRACTION_CURRENTRX
OD_ADD: +2.25
OS_SPHERE: -3.25
OS_ADD: +2.25
OS_OVR_VA: 20/
OD_OVR_VA: 20/
OS_VPRISM_DIRECTION: BF
OS_CYLINDER: -0.50
OD_AXIS: 145
OD_VPRISM_DIRECTION: BF
OD_CYLINDER: -0.75
OS_AXIS: 050
OD_SPHERE: -3.50

## 2020-05-26 ASSESSMENT — VISUAL ACUITY
OS_BCVA: 20/30-1
OD_BCVA: 20/40-2

## 2020-05-30 DIAGNOSIS — E55.9 VITAMIN D DEFICIENCY: ICD-10-CM

## 2020-06-02 RX ORDER — CHOLECALCIFEROL (VITAMIN D3) 1250 MCG
CAPSULE ORAL
Qty: 12 CAPSULE | Refills: 2 | OUTPATIENT
Start: 2020-06-02

## 2020-06-04 ENCOUNTER — OFFICE VISIT (OUTPATIENT)
Dept: PODIATRY | Facility: CLINIC | Age: 60
End: 2020-06-04
Payer: COMMERCIAL

## 2020-06-04 VITALS
WEIGHT: 234.6 LBS | HEIGHT: 70 IN | BODY MASS INDEX: 33.58 KG/M2 | SYSTOLIC BLOOD PRESSURE: 120 MMHG | HEART RATE: 70 BPM | DIASTOLIC BLOOD PRESSURE: 73 MMHG

## 2020-06-04 DIAGNOSIS — B35.1 ONYCHOMYCOSIS: ICD-10-CM

## 2020-06-04 DIAGNOSIS — L85.1 ACQUIRED KERATODERMA: ICD-10-CM

## 2020-06-04 DIAGNOSIS — Z89.421 ABSENCE OF TOE OF RIGHT FOOT (HCC): ICD-10-CM

## 2020-06-04 DIAGNOSIS — E11.42 DIABETIC POLYNEUROPATHY ASSOCIATED WITH TYPE 2 DIABETES MELLITUS (HCC): Primary | ICD-10-CM

## 2020-06-04 DIAGNOSIS — M20.41 HAMMER TOES OF BOTH FEET: ICD-10-CM

## 2020-06-04 DIAGNOSIS — M20.42 HAMMER TOES OF BOTH FEET: ICD-10-CM

## 2020-06-04 PROCEDURE — 1036F TOBACCO NON-USER: CPT | Performed by: PODIATRIST

## 2020-06-04 PROCEDURE — 3008F BODY MASS INDEX DOCD: CPT | Performed by: PODIATRIST

## 2020-06-04 PROCEDURE — 99213 OFFICE O/P EST LOW 20 MIN: CPT | Performed by: PODIATRIST

## 2020-06-04 PROCEDURE — 3066F NEPHROPATHY DOC TX: CPT | Performed by: PODIATRIST

## 2020-06-04 PROCEDURE — 11055 PARING/CUTG B9 HYPRKER LES 1: CPT | Performed by: PODIATRIST

## 2020-06-04 PROCEDURE — 11721 DEBRIDE NAIL 6 OR MORE: CPT | Performed by: PODIATRIST

## 2020-06-04 PROCEDURE — 3051F HG A1C>EQUAL 7.0%<8.0%: CPT | Performed by: PODIATRIST

## 2020-06-05 DIAGNOSIS — E11.65 TYPE 2 DIABETES MELLITUS WITH HYPERGLYCEMIA, UNSPECIFIED WHETHER LONG TERM INSULIN USE (HCC): Chronic | ICD-10-CM

## 2020-06-05 DIAGNOSIS — E11.65 TYPE 2 DIABETES MELLITUS WITH HYPERGLYCEMIA, WITH LONG-TERM CURRENT USE OF INSULIN (HCC): ICD-10-CM

## 2020-06-05 DIAGNOSIS — Z79.4 TYPE 2 DIABETES MELLITUS WITH HYPERGLYCEMIA, WITH LONG-TERM CURRENT USE OF INSULIN (HCC): ICD-10-CM

## 2020-06-08 LAB — HBA1C MFR BLD HPLC: 5.8 %

## 2020-06-11 ENCOUNTER — TELEPHONE (OUTPATIENT)
Dept: NEUROLOGY | Facility: CLINIC | Age: 60
End: 2020-06-11

## 2020-06-30 DIAGNOSIS — I63.9 ISCHEMIC CEREBROVASCULAR ACCIDENT (CVA) (HCC): ICD-10-CM

## 2020-06-30 DIAGNOSIS — N18.30 STAGE 3 CHRONIC KIDNEY DISEASE (HCC): ICD-10-CM

## 2020-06-30 DIAGNOSIS — E78.2 MIXED HYPERLIPIDEMIA: ICD-10-CM

## 2020-06-30 DIAGNOSIS — I69.30 HISTORY OF ISCHEMIC CEREBROVASCULAR ACCIDENT (CVA) WITH RESIDUAL DEFICIT: ICD-10-CM

## 2020-06-30 RX ORDER — ATORVASTATIN CALCIUM 80 MG/1
40 TABLET, FILM COATED ORAL
Qty: 45 TABLET | Refills: 1 | Status: SHIPPED | OUTPATIENT
Start: 2020-06-30 | End: 2020-12-15

## 2020-07-03 DIAGNOSIS — I10 ESSENTIAL HYPERTENSION: Chronic | ICD-10-CM

## 2020-07-03 DIAGNOSIS — I69.30 HISTORY OF ISCHEMIC CEREBROVASCULAR ACCIDENT (CVA) WITH RESIDUAL DEFICIT: ICD-10-CM

## 2020-07-03 DIAGNOSIS — N18.30 BENIGN HYPERTENSION WITH CKD (CHRONIC KIDNEY DISEASE) STAGE III (HCC): ICD-10-CM

## 2020-07-03 DIAGNOSIS — I12.9 BENIGN HYPERTENSION WITH CKD (CHRONIC KIDNEY DISEASE) STAGE III (HCC): ICD-10-CM

## 2020-07-03 RX ORDER — AMLODIPINE BESYLATE 5 MG/1
TABLET ORAL
Qty: 180 TABLET | Refills: 1 | Status: SHIPPED | OUTPATIENT
Start: 2020-07-03 | End: 2020-10-09 | Stop reason: HOSPADM

## 2020-07-06 DIAGNOSIS — Z79.4 TYPE 2 DIABETES MELLITUS WITH HYPERGLYCEMIA, WITH LONG-TERM CURRENT USE OF INSULIN (HCC): ICD-10-CM

## 2020-07-06 DIAGNOSIS — E11.65 TYPE 2 DIABETES MELLITUS WITH HYPERGLYCEMIA, WITH LONG-TERM CURRENT USE OF INSULIN (HCC): ICD-10-CM

## 2020-08-14 DIAGNOSIS — E55.9 VITAMIN D DEFICIENCY: ICD-10-CM

## 2020-08-14 RX ORDER — CHOLECALCIFEROL (VITAMIN D3) 1250 MCG
CAPSULE ORAL
Qty: 12 CAPSULE | Refills: 2 | Status: SHIPPED | OUTPATIENT
Start: 2020-08-14 | End: 2021-03-31

## 2020-08-15 NOTE — PROGRESS NOTES
Consultation - Cardiology Office  [facility]  Darinel Simon 61 y o  male MRN: [id number]  : 1960  Unit/Bed#:  Encounter: [id number]      Assessment:     1  Essential hypertension    2  Benign hypertension with CKD (chronic kidney disease) stage IV ([facility] )    3  Mixed hyperlipidemia    4  History of stroke    5  Stage 5 chronic kidney disease ([facility] )    6  Encounter for screening colonoscopy        Discussion summary and Plan:    1  Status post surgery for AV fistula  Patient has surgery done without any problems  2  Exertion shortness of breath  Chronic not changed  Most likely secondary to deconditioning  Patient's result discussed with him  Less likely to be cardiac in origin in view of his nonischemic nuclear and normal LV systolic function  This was discussed with patient and patient's wife again  3  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  Patient on medical Rx he has recovered lot of ambulatory function  Has some memory issues  No new symptoms      4  Dyslipidemia  Currently high intensity statin  Continue Lipitor  Follow-up LFTs    5  Essential hypertension  Patient blood pressure is well controlled currently amlodipine, Coreg lisinopril  Unfortunately patient's kidney function is getting worse management as per Nephrology  May need dialysis as per wife    6  CKD stage 4/5  Follow up with Nephrology  Patient regularly follows up with Nephrology  Creatinine has worsened potassium was acceptable last labs were done     7  Incontinent  Could be due to dysautonomia or neurogenic bladder  May need Botox injections    8  Colon cancer screening  Referral was made after discussion with them  Thank you for your consultation  If you have any question please call me at [phone]    Counseling :  A description of the counseling  Goals and Barriers    Patient's ability to self care: Yes  Medication side effect reviewed with patient in detail and all their questions answered to their satisfaction  Primary Care Physician t: Yonas Barajas,       HPI :     Nelson Montez is a 61y o  year old male who was was initially seen by us many years ago was recently [facility] with near syncopal episode  Patient has past medical significant for chronic kidney disease, diabetes mellitus, diarrhea, history of stroke who presents with unresponsive episode  Patient was going to flea market in his car with his family, he was not driving  Family noted him to be very sweaty and pale, and became unresponsive  Patient was brought to the emergency department and noted to have blood glucose of 40  Patient is not doing well  He denies any new complaints  His echo from [facility] reviewed  During workup he was found to have CKD stage 3/4  He now follows up with [facility]  He is doing well  His kidney function has stabilized  He does occasionally get short of breath when he walks  After his stroke is not that active  He is having lot of problem with his urine incontinent  He is scheduled to have a procedure done by urology  He may need clearance for that procedure  08/19/2020  Above reviewed  Patient came for follow-up  He is doing well from cardiac point of view  He had his AV fistula placed without any problem  He is close to CKD stays 5  His other medical history include history of hypertension, diabetes mellitus, history of stroke and abnormal EKG  He had a stress test done in October 2019 which was nonischemic and normal systolic function  No nausea no vomiting no fever no chills  Blood sugar has been up and out  He has issue with sleeping at night because he sleeps during day times  No other issue at this time  Review of Systems   Constitutional: Negative for activity change, chills, diaphoresis, fever and unexpected weight change  HENT: Negative for congestion      Eyes: Negative for discharge and redness  Respiratory: Negative for cough, chest tightness, shortness of breath and wheezing  Cardiovascular: Negative  Negative for chest pain, palpitations and leg swelling  Gastrointestinal: Negative for abdominal pain, diarrhea and nausea  Endocrine: Negative  Genitourinary: Negative for decreased urine volume and urgency  CKD stage 4/5   Musculoskeletal: Positive for arthralgias  Negative for back pain and gait problem  Skin: Negative for rash and wound  Allergic/Immunologic: Negative  Neurological: Negative for dizziness, seizures, syncope, weakness, light-headedness and headaches  Hematological: Negative  Psychiatric/Behavioral: Negative for agitation and confusion  The patient is nervous/anxious  Historical Information   Past Medical History:   Diagnosis Date    Chronic kidney disease     [facility]     GERD (gastroesophageal reflux disease)     Hypercholesteremia     Hyperlipidemia     Hypertension     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    Neuropathy     Obesity     [facility]     last assessed 11/4/16    PVC's (premature ventricular contractions)     sees cardiology Dr Vicente camargo    [facility])     last weeof July 2018 [address]     Past Surgical History:   Procedure Laterality Date    ABDOMINAL SURGERY      CHOLECYSTECTOMY      Percutaneous    COLONOSCOPY      CYSTOSCOPY      OTHER SURGICAL HISTORY      "stimulator to control bowel movements"    MN ESOPHAGOGASTRODUODENOSCOPY TRANSORAL DIAGNOSTIC N/A 9/27/2016    Procedure: ESOPHAGOGASTRODUODENOSCOPY (EGD); Surgeon: Alyssia Vincent MD;  [facility];   Service: Gastroenterology    MN LAP,CHOLECYSTECTOMY N/A 2/29/2016    Procedure: LAPAROSCOPIC CHOLECYSTECTOMY ;  Surgeon: Alexandro Mcallister DO;  Location: [facility];  Service: General    ROTATOR CUFF REPAIR Right     TOE AMPUTATION Right 10/28/2016    Procedure: 3RD TOE AMPUTATION ;  Surgeon: Jeison Bains DPM;  [facility];  Service:      Social History     Substance and Sexual Activity   Alcohol Use No    Frequency: Never    Drinks per session: Patient refused    Binge frequency: Never     Social History     Substance and Sexual Activity   Drug Use No     Social History     Tobacco Use   Smoking Status Never Smoker   Smokeless Tobacco Never Used     Family History:   Family History   Problem Relation Age of Onset    Leukemia Mother     Liver disease Mother     Lung cancer Mother         heavy smoker - 3 ppd    Heart disease Father     Liver disease Father     Multiple myeloma Sister     Breast cancer Sister     Urolithiasis Family     Alcohol abuse Neg Hx     Depression Neg Hx     Drug abuse Neg Hx     Substance Abuse Neg Hx     Mental illness Neg Hx        Meds/Allergies     No Known Allergies    Current Outpatient Medications:     amLODIPine (NORVASC) 5 mg tablet, TAKE 1 TABLET BY MOUTH EVERY 12 HOURS, Disp: 180 tablet, Rfl: 1    aspirin (ECOTRIN LOW STRENGTH) 81 mg EC tablet, Take 81 mg by mouth daily Resume on 8/14, Disp: , Rfl:     atorvastatin (LIPITOR) 80 mg tablet, Take 0 5 tablets (40 mg total) by mouth daily with dinner, Disp: 45 tablet, Rfl: 1    Blood Glucose Monitoring Suppl (TRUE METRIX METER) w/Device KIT, Use to test blood sugars 3 times daily, Disp: 1 kit, Rfl: 0    Blood Pressure Monitoring (BLOOD PRESSURE CUFF) MISC, Use to check blood pressure before taking blood pressure medication and 1 hour after and follow instructions provided in discharge instructions based on the readings  , Disp: 1 each, Rfl: 0    carvedilol (COREG) 6 25 mg tablet, Take 1 tablet (6 25 mg total) by mouth 2 (two) times a day with meals (Patient taking differently: Take 12 5 mg by mouth 2 (two) times a day with meals ), Disp: 180 tablet, Rfl: 1    Cholecalciferol (Vitamin D3) 1 25 MG (26754 UT) CAPS, TAKE 1 CAPSULE BY MOUTH ONE TIME PER WEEK, Disp: 12 capsule, Rfl: 2    Continuous Blood Gluc  (DEXCOM G6 ) LANCE, Use as directed for continuous glucose monitoring, Disp: 1 Device, Rfl: 0    Continuous Blood Gluc Sensor (DEXCOM G6 SENSOR) MISC, Use as directed for continuous glucose monitoring   Change every 10 days, Disp: 1 each, Rfl: 11    Continuous Blood Gluc Transmit (DEXCOM G6 TRANSMITTER) MISC, Use as directed for continuous glucose monitoring-Change every 3 months, Disp: 1 each, Rfl: 3    cyanocobalamin (CVS Vitamin B-12) 1000 MCG tablet, Take 1 tablet (1,000 mcg total) by mouth daily, Disp: 90 tablet, Rfl: 1    glucagon (GLUCAGON EMERGENCY) 1 MG injection, Inject 1 mg under the skin once as needed for low blood sugar for up to 1 dose, Disp: 1 kit, Rfl: 2    glucose 4 g chewable tablet, Chew 3 tablets (12 g total) as needed for low blood sugar, Disp: 50 tablet, Rfl: 0    glucose blood test strip, 1 each by Other route 4 (four) times a day Use to test blood sugars 3 times daily, Disp: 400 each, Rfl: 1    insulin aspart (NovoLOG) 100 units/mL injection, INJECT 2 UNITS WITH MEALS IF BG IS OVER 130 mg/dl (Patient taking differently: INJECT 4 UNITS WITH MEALS IF BG IS OVER 130 mg/dl), Disp: 20 mL, Rfl: 0    insulin glargine (Basaglar KwikPen) 100 units/mL injection pen, Inject 15 Units under the skin daily (Patient taking differently: Inject 25 Units under the skin daily ), Disp: 5 pen, Rfl: 3    Insulin Pen Needle 32G X 4 MM MISC, USE TO INJECT INSULIN 4 TIMES DAILY, Disp: 120 each, Rfl: 1    Insulin Syringe-Needle U-100 (B-D INS SYRINGE 0 5CC/30GX1/2") 30G X 1/2" 0 5 ML MISC, Inject under the skin 4 (four) times a day, Disp: 360 each, Rfl: 1    lidocaine (LMX) 4 % cream, Apply topically 2 (two) times a day As needed to affected area, Disp: 45 g, Rfl: 0    ONETOUCH DELICA LANCETS 10D MISC, by Does not apply route 3 (three) times a day, Disp: 270 each, Rfl: 1    gabapentin (NEURONTIN) 250 mg/5 mL solution, Take 12 mL (600 mg total) by mouth daily at bedtime (Patient not taking: Reported on 8/19/2020), Disp: 470 mL, Rfl: 2    Incontinence Supplies (MALE URINAL) MISC, by Does not apply route daily (Patient not taking: Reported on 8/19/2020), Disp: 6 each, Rfl: 3    Vitals: Blood pressure 120/80, pulse 73, temperature (!) 96 7 °F (35 9 °C), temperature source Temporal, height 5' 10" (1 778 m), weight 109 kg (241 lb), SpO2 99 %  Body mass index is 34 58 kg/m²  Vitals:    08/19/20 1409   Weight: 109 kg (241 lb)     BP Readings from Last 3 Encounters:   08/19/20 120/80   06/04/20 120/73   03/18/20 110/62         Physical Exam  Constitutional:       General: He is not in acute distress  Appearance: He is well-developed  He is not diaphoretic  HENT:      Head: Normocephalic and atraumatic  Eyes:      Pupils: Pupils are equal, round, and reactive to light  Neck:      Musculoskeletal: Neck supple  Thyroid: No thyromegaly  Vascular: No JVD  Trachea: No tracheal deviation  Cardiovascular:      Rate and Rhythm: Normal rate and regular rhythm  Heart sounds: S1 normal and S2 normal  Heart sounds not distant  Murmur present  Systolic (ejection) murmur present with a grade of 2/6  No friction rub  No gallop  No S3 or S4 sounds  Comments: S1-S2 regular with 2/6 as murmur  Pulmonary:      Effort: Pulmonary effort is normal  No respiratory distress  Breath sounds: Normal breath sounds  No wheezing or rales  Chest:      Chest wall: No tenderness  Abdominal:      General: Bowel sounds are normal  There is no distension  Palpations: Abdomen is soft  Tenderness: There is no abdominal tenderness  Musculoskeletal:         General: No deformity  Skin:     General: Skin is warm and dry  Coloration: Skin is not pale  Findings: No rash  Neurological:      Mental Status: He is alert and oriented to person, place, and time     Psychiatric:         Behavior: Behavior normal          Judgment: Judgment normal  Diagnostic Studies Review Cardio:  Nuclear stress test   Nuclear stress test 10/14/2019 were nonischemic EF around 50%  Echo Doppler  Echo Doppler 10/29/2018 is below  EKG:  EKG today shows normal sinus rhythm nonspecific ST changes heart rate 80 beats per minute  Twelve lead EKG 01/15/2020 shows normal sinus rhythm LVH by voltage  ST abnormality in inferior leads certainly cannot rule ischemia but had a nonischemic nuclear  Twelve lead EKG 2021 shows normal sinus rhythm heart rate 76 beats per minute nonspecific ST changes in inferior lead no change from old EKG  Cardiac testing:   Results for orders placed during the hospital encounter of 10/28/18   Echo complete with contrast if indicated    Narrative [facility], [address] [facility]  [phone]    Transthoracic Echocardiogram  2D, M-mode, Doppler, and Color Doppler    Study date:  29-Oct-2018    Patient: Juan Graf  MR number: [id number]  Account number: [de-identified]  : 1960  Age: 62 years  Gender: Male  Status: Inpatient  Location: Bedside  Height: 68 in  Weight: 232 5 lb  BP: 142/ 76 mmHg    Indications: TIA    Diagnoses: G45 9 - Transient cerebral ischemic attack, unspecified    Sonographer:  Chris Canales RDCS  Primary Physician:  Lazaro Chen DO  Referring Physician:  Otto Navarrete PA-C  Group:  [facility]  Interpreting Physician:  Ismael David MD    SUMMARY    LEFT VENTRICLE:  Systolic function was normal  Ejection fraction was estimated to be 55 %  Although no diagnostic regional wall motion abnormality was identified, this possibility cannot be completely excluded on the basis of this study  Wall thickness was increased  Doppler parameters were consistent with abnormal left ventricular relaxation (grade 1 diastolic dysfunction)  AORTIC VALVE:  The valve was functionally bicuspid   Leaflets exhibited moderately increased thickness, moderate calcification, and moderately reduced cuspal separation  The noncoronary cusp appears restricted  There was mild regurgitation  HISTORY: PRIOR HISTORY: DM, Hypertension, Hyperlipidemia, Obesity, PVC's, Stroke    PROCEDURE: The procedure was performed at the bedside  This was a routine study  The transthoracic approach was used  The study included complete 2D imaging, M-mode, complete spectral Doppler, and color Doppler  The heart rate was 72 bpm,  at the start of the study  Images were obtained from the parasternal, apical, subcostal, and suprasternal notch acoustic windows  Echocardiographic views were limited due to decreased penetration and lung interference  This was a  technically difficult study  LEFT VENTRICLE: Size was normal  Systolic function was normal  Ejection fraction was estimated to be 55 %  Although no diagnostic regional wall motion abnormality was identified, this possibility cannot be completely excluded on the basis  of this study  Wall thickness was increased  DOPPLER: There was an increased relative contribution of atrial contraction to ventricular filling  Doppler parameters were consistent with abnormal left ventricular relaxation (grade 1  diastolic dysfunction)  RIGHT VENTRICLE: The size was normal  Systolic function was normal     LEFT ATRIUM: The atrium was mildly dilated  RIGHT ATRIUM: Size was normal     MITRAL VALVE: There was mild to moderate annular calcification  Valve structure was normal  There was mild calcification  There was normal leaflet separation  DOPPLER: There was no evidence for stenosis  There was no significant  regurgitation  AORTIC VALVE: The valve was functionally bicuspid  Leaflets exhibited moderately increased thickness, moderate calcification, and moderately reduced cuspal separation  The noncoronary cusp appears restricted  DOPPLER: There was no evidence  for stenosis  There was mild regurgitation      TRICUSPID VALVE: The valve structure was normal  There was normal leaflet separation  DOPPLER: There was no evidence for stenosis  There was no significant regurgitation  PULMONIC VALVE: Leaflets exhibited normal thickness, no calcification, and normal cuspal separation  DOPPLER: The transpulmonic velocity was within the normal range  There was mild regurgitation  PERICARDIUM: There was no pericardial effusion  The pericardium was normal in appearance  AORTA: The root exhibited normal size  SYSTEMIC VEINS: IVC: The inferior vena cava was normal in size and course  Respirophasic changes were normal     SYSTEM MEASUREMENT TABLES    2D  %FS: 24 %  AV Diam: 3 33 cm  EDV(Teich): 140 07 ml  EF Biplane: 58 38 %  EF(Cube): 56 1 %  EF(Teich): 47 36 %  ESV(Cube): 68 34 ml  ESV(Teich): 73 73 ml  IVSd: 1 24 cm  LA Area: 21 11 cm2  LA Diam: 3 95 cm  LVEDV MOD A2C: 135 47 ml  LVEDV MOD A4C: 174 64 ml  LVEDV MOD BP: 157 66 ml  LVEF MOD A2C: 59 41 %  LVEF MOD A4C: 56 77 %  LVESV MOD A2C: 54 99 ml  LVESV MOD A4C: 75 49 ml  LVESV MOD BP: 65 62 ml  LVIDd: 5 38 cm  LVIDs: 4 09 cm  LVLd A2C: 8 91 cm  LVLd A4C: 9 42 cm  LVLs A2C: 8 03 cm  LVLs A4C: 7 66 cm  LVOT Diam: 2 08 cm  LVPWd: 1 26 cm  RA Area: 18 12 cm2  RV Diam: 3 33 cm  SI(Cube): 40 06 ml/m2  SI(Teich): 30 43 ml/m2  SV MOD A2C: 80 48 ml  SV MOD A4C: 99 15 ml  SV(Cube): 87 33 ml  SV(Teich): 66 34 ml    CW  AR Dec Lander: 1 95 m/s2  AR Dec Time: 2036 6 ms  AR PHT: 590 61 ms  AR Vmax: 3 97 m/s  AR maxP 14 mmHg  AV Env  Ti: 310 54 ms  AV MaxPG: 15 41 mmHg  AV SI: 172 89 ml/m2  AV SV: 376 91 ml  AV VTI: 43 31 cm  AV Vmax: 1 96 m/s  AV Vmean: 1 39 m/s  AV meanP 41 mmHg    MM  TAPSE: 1 75 cm    PW  E': 0 07 m/s  E/E': 10 46  MV A Carlin: 0 93 m/s  MV Dec Lander: 3 63 m/s2  MV DecT: 190 71 ms  MV E Carlin: 0 69 m/s  MV E/A Ratio: 0 75    [employer] Accredited Echocardiography Laboratory    Prepared and electronically signed by    Pepe Salvador MD  Signed 29-Oct-2018 13:16:25         Lab Review   Lab Results   Component Value Date    WBC 7 54 02/19/2020    HGB 11 1 (L) 02/19/2020    HCT 35 0 (L) 02/19/2020    MCV 89 02/19/2020    RDW 13 0 02/19/2020     02/19/2020     BMP:  Lab Results   Component Value Date    SODIUM 142 02/19/2020    K 4 9 02/19/2020     02/19/2020    CO2 22 02/19/2020    ANIONGAP 9 01/03/2016    BUN 74 (H) 02/19/2020    CREATININE 4 27 (H) 02/19/2020    GLUC 93 02/19/2020    GLUF 134 (H) 01/11/2020    CALCIUM 8 3 02/19/2020    EGFR 14 02/19/2020    MG 2 0 09/13/2019     LFT:  Lab Results   Component Value Date    AST 11 02/19/2020    ALT 27 02/19/2020    ALKPHOS 132 (H) 02/19/2020    TP 7 2 02/19/2020    ALB 3 1 (L) 02/19/2020      Lab Results   Component Value Date    PLK6ACRSHHZC 1 946 08/02/2018     Lab Results   Component Value Date    HGBA1C 5 8 (H) 06/08/2020     Lipid Profile:   Lab Results   Component Value Date    CHOLESTEROL 80 01/11/2020    HDL 31 (L) 01/11/2020    LDLCALC 20 01/11/2020    TRIG 143 01/11/2020     Lab Results   Component Value Date    CHOLESTEROL 80 01/11/2020    CHOLESTEROL 70 10/29/2018     Lab Results   Component Value Date    TROPONINI <0 02 07/09/2019     No results found for: NTBNP         Dr Magdalena Pizano MD [facility] [place]      "This note has been constructed using a voice recognition system  Therefore there may be syntax, spelling, and/or grammatical errors   Please call if you have any questions  "

## 2020-08-19 ENCOUNTER — OFFICE VISIT (OUTPATIENT)
Dept: CARDIOLOGY CLINIC | Facility: CLINIC | Age: 60
End: 2020-08-19
Payer: COMMERCIAL

## 2020-08-19 VITALS
TEMPERATURE: 96.7 F | OXYGEN SATURATION: 99 % | DIASTOLIC BLOOD PRESSURE: 80 MMHG | HEART RATE: 73 BPM | WEIGHT: 241 LBS | SYSTOLIC BLOOD PRESSURE: 120 MMHG | HEIGHT: 70 IN | BODY MASS INDEX: 34.5 KG/M2

## 2020-08-19 DIAGNOSIS — Z12.11 ENCOUNTER FOR SCREENING COLONOSCOPY: ICD-10-CM

## 2020-08-19 DIAGNOSIS — I12.9 BENIGN HYPERTENSION WITH CKD (CHRONIC KIDNEY DISEASE) STAGE IV (HCC): ICD-10-CM

## 2020-08-19 DIAGNOSIS — Z86.73 HISTORY OF STROKE: ICD-10-CM

## 2020-08-19 DIAGNOSIS — N18.4 BENIGN HYPERTENSION WITH CKD (CHRONIC KIDNEY DISEASE) STAGE IV (HCC): ICD-10-CM

## 2020-08-19 DIAGNOSIS — I10 ESSENTIAL HYPERTENSION: ICD-10-CM

## 2020-08-19 DIAGNOSIS — N18.5 STAGE 5 CHRONIC KIDNEY DISEASE (HCC): ICD-10-CM

## 2020-08-19 DIAGNOSIS — E78.2 MIXED HYPERLIPIDEMIA: ICD-10-CM

## 2020-08-19 PROCEDURE — 3079F DIAST BP 80-89 MM HG: CPT | Performed by: INTERNAL MEDICINE

## 2020-08-19 PROCEDURE — 3044F HG A1C LEVEL LT 7.0%: CPT | Performed by: INTERNAL MEDICINE

## 2020-08-19 PROCEDURE — 1036F TOBACCO NON-USER: CPT | Performed by: INTERNAL MEDICINE

## 2020-08-19 PROCEDURE — 3066F NEPHROPATHY DOC TX: CPT | Performed by: INTERNAL MEDICINE

## 2020-08-19 PROCEDURE — 3008F BODY MASS INDEX DOCD: CPT | Performed by: INTERNAL MEDICINE

## 2020-08-19 PROCEDURE — 3074F SYST BP LT 130 MM HG: CPT | Performed by: INTERNAL MEDICINE

## 2020-08-19 PROCEDURE — 99214 OFFICE O/P EST MOD 30 MIN: CPT | Performed by: INTERNAL MEDICINE

## 2020-08-20 ENCOUNTER — TELEPHONE (OUTPATIENT)
Dept: ADMINISTRATIVE | Facility: OTHER | Age: 60
End: 2020-08-20

## 2020-08-20 NOTE — TELEPHONE ENCOUNTER
----- Message from Jovanny Mendenhall MA sent at 8/19/2020  2:25 PM EDT -----  08/19/20 2:26 PM     Hello, our patient No patient name on file  had a Colonoscopy  completed/performed  Please assist in updating the patient chart by making an External outreach to [facility] - Dr Mary Acosta (sp?)      Thank you,   Jovanny Mendenhall MA   No information on file

## 2020-08-20 NOTE — LETTER
[facility] Medical Records Request for Care Gap Closure    Medical Records Fax: [phone]    Date Requested: 20  Patient: Breonna Given  Patient : 1960   Requesting on behalf of Provider: DO Raj Booth DO has requested the retrieval and updating of CRC: Colonoscopy  The office staff has provided us with the following information listed below  Prior to sending this request I have reviewed Epic Chart Review, completed an Epic Chart Search, and reviewed Care Everywhere documents  Estimated Date of Service: n/a  Facility: [facility]  Specialty: [place]on Mcbride  Performing Provider: n/a  Additional Comments: most recent colonoscopy report     Your assistance in completing this request is greatly appreciated  Please send document to [phone] maricruz Vaughn Distance: Phone [phone]       Sincerely,      Cristobal Sacks, [place]

## 2020-08-20 NOTE — TELEPHONE ENCOUNTER
Upon review of the In Basket request and the patient's chart, initial outreach has been made via fax, please see Contacts section for details  A second outreach attempt will be made within 5 business days      Thank you  Cristobal Sacks, [address] Records [phone]

## 2020-08-26 NOTE — TELEPHONE ENCOUNTER
As a final attempt, a third outreach has been made via telephone call  Please see Contacts section for details  This encounter will be closed and completed by end of day  Should we receive the requested information because of previous outreach attempts, the requested patient's chart will be updated appropriately       Thank you  Cortez Fallon, Erika [facility] Fannie Parish

## 2020-09-03 ENCOUNTER — OFFICE VISIT (OUTPATIENT)
Dept: PODIATRY | Facility: CLINIC | Age: 60
End: 2020-09-03
Payer: COMMERCIAL

## 2020-09-03 VITALS — BODY MASS INDEX: 34.58 KG/M2 | TEMPERATURE: 97.3 F | HEIGHT: 70 IN

## 2020-09-03 DIAGNOSIS — I89.0 LYMPHEDEMA: ICD-10-CM

## 2020-09-03 DIAGNOSIS — Z89.421 ABSENCE OF TOE OF RIGHT FOOT (HCC): ICD-10-CM

## 2020-09-03 DIAGNOSIS — E11.42 DIABETIC POLYNEUROPATHY ASSOCIATED WITH TYPE 2 DIABETES MELLITUS (HCC): Primary | ICD-10-CM

## 2020-09-03 DIAGNOSIS — B35.1 ONYCHOMYCOSIS: ICD-10-CM

## 2020-09-03 PROCEDURE — 11721 DEBRIDE NAIL 6 OR MORE: CPT | Performed by: PODIATRIST

## 2020-09-03 PROCEDURE — 1036F TOBACCO NON-USER: CPT | Performed by: PODIATRIST

## 2020-09-03 PROCEDURE — 99213 OFFICE O/P EST LOW 20 MIN: CPT | Performed by: PODIATRIST

## 2020-09-03 NOTE — PROGRESS NOTES
PATIENT:  Syed Kc    1960    ASSESSMENT:     1  Diabetic polyneuropathy associated with type 2 diabetes mellitus ([address] )     2  Absence of toe of right foot (HCC)     3  Lymphedema  Compression Stocking   4  Onychomycosis  Debridement       PLAN:  1  Educated disease prevention and risks related to diabetes  Educated proper daily foot care and exam   Instructed proper skin care / protection and footwear  2  Instructed to identify any signs of infection and related foot problem  3  The recent blood work was reviewed and the last HbA1c was 5 8  Discussed proper blood glucose control with diet and exercise  4  Instructed compression, elevation, and low sodium diet to manage LE edema  Sent him for compression stockings  5  RA in 3 months for diabetic foot exam       Procedure: All mycotic toenails were reduced and debrided in length, width, and girth using a nail nipper and dremel  All hyperkeratotic skin lesion(s) were sharply pared with a scalpel with no bleeding or evidence of ulceration  Patient tolerated procedure(s) well without complications  Subjective:      HPI: The patients presents with his wife for diabetic foot evaluation  He has high risk diabetic feet with history of foot ulcer and partial toe amputation  The blood glucose has been under control  He has numbness in his feet  No foot ulcer  He complains that foot swelling is still up and down  He also complained of thick, elongated toenails  Denied weakness or significant functional deficit  The patient presents with new diabetic shoes  The following portions of the patient's history were reviewed and updated as appropriate: allergies, current medications, past family history, past medical history, past social history, past surgical history and problem list   All pertinent labs and images were reviewed      Past Medical History  Past Medical History:   Diagnosis Date    Chronic kidney disease     Diabetes mellitus ([facility] [address] )     GERD (gastroesophageal reflux disease)     Hypercholesteremia     Hyperlipidemia     Hypertension     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    Neuropathy     Obesity     Osteomyelitis ([facility] [address] )     last assessed 11/4/16    PVC's (premature ventricular contractions)     sees cardiology Dr Elvia camargo    Stroke [facility])     last weeof July 2018 [address] [facility]       Past Surgical History  Past Surgical History:   Procedure Laterality Date    ABDOMINAL SURGERY      CHOLECYSTECTOMY      Percutaneous    COLONOSCOPY      CYSTOSCOPY      OTHER SURGICAL HISTORY      "stimulator to control bowel movements"    MN ESOPHAGOGASTRODUODENOSCOPY TRANSORAL DIAGNOSTIC N/A 9/27/2016    Procedure: ESOPHAGOGASTRODUODENOSCOPY (EGD); Surgeon: Theodora Jacques MD;  [facility]; Service: Gastroenterology    MN LAP,CHOLECYSTECTOMY N/A 2/29/2016    Procedure: LAPAROSCOPIC CHOLECYSTECTOMY ;  Surgeon: Joanie Randle DO;  [facility];  Service: General    ROTATOR CUFF REPAIR Right     TOE AMPUTATION Right 10/28/2016    Procedure: 3RD TOE AMPUTATION ;  Surgeon: Juan Couch DPM;  [facility];  Service:         Allergies:  Patient has no known allergies  Medications:  Current Outpatient Medications   Medication Sig Dispense Refill    amLODIPine (NORVASC) 5 mg tablet TAKE 1 TABLET BY MOUTH EVERY 12 HOURS 180 tablet 1    aspirin (ECOTRIN LOW STRENGTH) 81 mg EC tablet Take 81 mg by mouth daily Resume on 8/14      atorvastatin (LIPITOR) 80 mg tablet Take 0 5 tablets (40 mg total) by mouth daily with dinner 45 tablet 1    Blood Glucose Monitoring Suppl (TRUE METRIX METER) w/Device KIT Use to test blood sugars 3 times daily 1 kit 0    Blood Pressure Monitoring (BLOOD PRESSURE CUFF) MISC Use to check blood pressure before taking blood pressure medication and 1 hour after and follow instructions provided in discharge instructions based on the readings   1 each 0    carvedilol (COREG) 6 25 mg tablet Take 1 tablet (6 25 mg total) by mouth 2 (two) times a day with meals (Patient taking differently: Take 12 5 mg by mouth 2 (two) times a day with meals ) 180 tablet 1    Cholecalciferol (Vitamin D3) 1 25 MG (43132 UT) CAPS TAKE 1 CAPSULE BY MOUTH ONE TIME PER WEEK 12 capsule 2    Continuous Blood Gluc  (DEXCOM G6 ) LANCE Use as directed for continuous glucose monitoring 1 Device 0    Continuous Blood Gluc Sensor (DEXCOM G6 SENSOR) MISC Use as directed for continuous glucose monitoring   Change every 10 days 1 each 11    Continuous Blood Gluc Transmit (DEXCOM G6 TRANSMITTER) MISC Use as directed for continuous glucose monitoring-Change every 3 months 1 each 3    cyanocobalamin ([employer] Vitamin B-12) 1000 MCG tablet Take 1 tablet (1,000 mcg total) by mouth daily 90 tablet 1    gabapentin (NEURONTIN) 250 mg/5 mL solution Take 12 mL (600 mg total) by mouth daily at bedtime 470 mL 2    glucagon (GLUCAGON EMERGENCY) 1 MG injection Inject 1 mg under the skin once as needed for low blood sugar for up to 1 dose 1 kit 2    glucose 4 g chewable tablet Chew 3 tablets (12 g total) as needed for low blood sugar 50 tablet 0    glucose blood test strip 1 each by Other route 4 (four) times a day Use to test blood sugars 3 times daily 400 each 1    Incontinence Supplies (MALE URINAL) MISC by Does not apply route daily 6 each 3    insulin aspart (NovoLOG) 100 units/mL injection INJECT 2 UNITS WITH MEALS IF BG IS OVER 130 mg/dl (Patient taking differently: INJECT 4 UNITS WITH MEALS IF BG IS OVER 130 mg/dl) 20 mL 0    insulin glargine (Basaglar KwikPen) 100 units/mL injection pen Inject 15 Units under the skin daily (Patient taking differently: Inject 25 Units under the skin daily ) 5 pen 3    Insulin Pen Needle 32G X 4 MM MISC USE TO INJECT INSULIN 4 TIMES DAILY 120 each 1    Insulin Syringe-Needle U-100 (B-D INS SYRINGE 0 5CC/30GX1/2") 30G X 1/2" 0 5 ML MISC Inject under the skin 4 (four) times a day 360 each 1    lidocaine (LMX) 4 % cream Apply topically 2 (two) times a day As needed to affected area 45 g 0    ONETOUCH DELICA LANCETS 65A MISC by Does not apply route 3 (three) times a day 270 each 1     No current facility-administered medications for this visit  Social History:  Social History     Socioeconomic History    Marital status: /Civil Union     Spouse name: None    Number of children: None    Years of education: None    Highest education level: Not asked   Occupational History    Occupation: disabled   Social Needs    Financial resource strain: Patient refused    Food insecurity     Worry: Patient refused     Inability: Patient refused    Transportation needs     Medical: No     Non-medical: No   Tobacco Use    Smoking status: Never Smoker    Smokeless tobacco: Never Used   Substance and Sexual Activity    Alcohol use: No     Frequency: Never     Drinks per session: Patient refused     Binge frequency: Never    Drug use: No    Sexual activity: Not Currently   Lifestyle    Physical activity     Days per week: None     Minutes per session: None    Stress: Only a little   Relationships    Social connections     Talks on phone: None     Gets together: None     Attends L[employer] service: None     Active member of club or organization: None     Attends meetings of clubs or organizations: None     Relationship status:     Intimate partner violence     Fear of current or ex partner: No     Emotionally abused: No     Physically abused: No     Forced sexual activity: No   Other Topics Concern    None   Social History Narrative    Daily caffeine consumption 2-3 servings a day        Review of Systems   Constitutional: Negative for chills and fever  Respiratory: Negative for cough and shortness of breath  Cardiovascular: Positive for leg swelling  Negative for chest pain     Gastrointestinal: Negative for constipation, diarrhea, nausea and vomiting  Skin: Negative for wound  Neurological: Positive for numbness  Psychiatric/Behavioral: Negative for confusion  Objective:      Temp (!) 97 3 °F (36 3 °C)   Ht 5' 10" (1 778 m)   BMI 34 58 kg/m²          Physical Exam  Vitals signs reviewed  Constitutional:       General: He is not in acute distress  Appearance: He is well-developed  Cardiovascular:      Rate and Rhythm: Normal rate and regular rhythm  Pulses: no weak pulses          Dorsalis pedis pulses are 0 on the right side and 0 on the left side  Posterior tibial pulses are 1+ on the right side and 1+ on the left side  Comments: Mild, diffuse 1+ pitting edema around foot / ankle bilaterally  Pulmonary:      Effort: Pulmonary effort is normal  No respiratory distress  Breath sounds: Normal breath sounds  Musculoskeletal:         General: No tenderness or signs of injury  Comments: No acute joint pain or edema  No redness  No ecchymosis  Hammertoe deformity noted  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  Feet:      Right foot: amputated     Skin integrity: Dry skin present  No ulcer, skin breakdown, erythema, warmth or callus  Left foot:      Skin integrity: Callus and dry skin present  No ulcer, skin breakdown, erythema or warmth  Skin:     Capillary Refill: Capillary refill takes less than 2 seconds  Coloration: Skin is not pale  Findings: No erythema  Comments: No ulcer in his feet  No redness or cellulitis  Thick, elongated nails X 7 with discoloration and onycholysis  Neurological:      General: No focal deficit present  Mental Status: He is alert and oriented to person, place, and time  Cranial Nerves: No cranial nerve deficit  Sensory: Sensory deficit present     Psychiatric:         Behavior: Behavior normal

## 2020-09-20 DIAGNOSIS — E53.8 B12 DEFICIENCY: ICD-10-CM

## 2020-09-20 RX ORDER — OMEGA-3/DHA/EPA/FISH OIL 35-113.5MG
TABLET,CHEWABLE ORAL
Qty: 90 TABLET | Refills: 1 | OUTPATIENT
Start: 2020-09-20

## 2020-09-23 DIAGNOSIS — E53.8 B12 DEFICIENCY: ICD-10-CM

## 2020-09-23 RX ORDER — OMEGA-3/DHA/EPA/FISH OIL 35-113.5MG
TABLET,CHEWABLE ORAL
Qty: 90 TABLET | Refills: 1 | OUTPATIENT
Start: 2020-09-23

## 2020-10-04 DIAGNOSIS — E11.65 TYPE 2 DIABETES MELLITUS WITH HYPERGLYCEMIA, WITH LONG-TERM CURRENT USE OF INSULIN (HCC): ICD-10-CM

## 2020-10-04 DIAGNOSIS — Z79.4 TYPE 2 DIABETES MELLITUS WITH HYPERGLYCEMIA, WITH LONG-TERM CURRENT USE OF INSULIN (HCC): ICD-10-CM

## 2020-10-05 ENCOUNTER — APPOINTMENT (EMERGENCY)
Dept: RADIOLOGY | Facility: HOSPITAL | Age: 60
DRG: 683 | End: 2020-10-05
Payer: COMMERCIAL

## 2020-10-05 ENCOUNTER — HOSPITAL ENCOUNTER (INPATIENT)
Facility: HOSPITAL | Age: 60
LOS: 4 days | Discharge: HOME | DRG: 683 | End: 2020-10-09
Attending: EMERGENCY MEDICINE | Admitting: INTERNAL MEDICINE
Payer: COMMERCIAL

## 2020-10-05 DIAGNOSIS — R60.0 LOWER EXTREMITY EDEMA: ICD-10-CM

## 2020-10-05 DIAGNOSIS — N17.9 ACUTE ON CHRONIC RENAL FAILURE (HCC): Primary | ICD-10-CM

## 2020-10-05 DIAGNOSIS — N18.5 CHRONIC KIDNEY DISEASE (CKD), STAGE V (HCC): ICD-10-CM

## 2020-10-05 DIAGNOSIS — N18.9 ACUTE ON CHRONIC RENAL FAILURE (HCC): Primary | ICD-10-CM

## 2020-10-05 DIAGNOSIS — N18.5 STAGE 5 CHRONIC KIDNEY DISEASE (HCC): ICD-10-CM

## 2020-10-05 DIAGNOSIS — R06.00 DYSPNEA: ICD-10-CM

## 2020-10-05 PROBLEM — D64.9 ANEMIA: Status: ACTIVE | Noted: 2020-10-05

## 2020-10-05 LAB
ALBUMIN SERPL BCP-MCNC: 3.1 G/DL (ref 3.5–5)
ALP SERPL-CCNC: 110 U/L (ref 46–116)
ALT SERPL W P-5'-P-CCNC: 17 U/L (ref 12–78)
ANION GAP SERPL CALCULATED.3IONS-SCNC: 10 MMOL/L (ref 4–13)
AST SERPL W P-5'-P-CCNC: 10 U/L (ref 5–45)
ATRIAL RATE: 75 BPM
BASOPHILS # BLD AUTO: 0.02 THOUSANDS/ΜL (ref 0–0.1)
BASOPHILS NFR BLD AUTO: 0 % (ref 0–1)
BILIRUB SERPL-MCNC: 0.42 MG/DL (ref 0.2–1)
BUN SERPL-MCNC: 74 MG/DL (ref 5–25)
CALCIUM ALBUM COR SERPL-MCNC: 8.7 MG/DL (ref 8.3–10.1)
CALCIUM SERPL-MCNC: 8 MG/DL (ref 8.3–10.1)
CHLORIDE SERPL-SCNC: 108 MMOL/L (ref 100–108)
CO2 SERPL-SCNC: 22 MMOL/L (ref 21–32)
CREAT SERPL-MCNC: 6.02 MG/DL (ref 0.6–1.3)
EOSINOPHIL # BLD AUTO: 0.15 THOUSAND/ΜL (ref 0–0.61)
EOSINOPHIL NFR BLD AUTO: 3 % (ref 0–6)
ERYTHROCYTE [DISTWIDTH] IN BLOOD BY AUTOMATED COUNT: 13.8 % (ref 11.6–15.1)
GFR SERPL CREATININE-BSD FRML MDRD: 9 ML/MIN/1.73SQ M
GLUCOSE SERPL-MCNC: 162 MG/DL (ref 65–140)
GLUCOSE SERPL-MCNC: 87 MG/DL (ref 65–140)
HCT VFR BLD AUTO: 33.9 % (ref 36.5–49.3)
HGB BLD-MCNC: 10.6 G/DL (ref 12–17)
IMM GRANULOCYTES # BLD AUTO: 0.02 THOUSAND/UL (ref 0–0.2)
IMM GRANULOCYTES NFR BLD AUTO: 0 % (ref 0–2)
LYMPHOCYTES # BLD AUTO: 1.18 THOUSANDS/ΜL (ref 0.6–4.47)
LYMPHOCYTES NFR BLD AUTO: 20 % (ref 14–44)
MCH RBC QN AUTO: 28.2 PG (ref 26.8–34.3)
MCHC RBC AUTO-ENTMCNC: 31.3 G/DL (ref 31.4–37.4)
MCV RBC AUTO: 90 FL (ref 82–98)
MONOCYTES # BLD AUTO: 0.63 THOUSAND/ΜL (ref 0.17–1.22)
MONOCYTES NFR BLD AUTO: 11 % (ref 4–12)
NEUTROPHILS # BLD AUTO: 3.91 THOUSANDS/ΜL (ref 1.85–7.62)
NEUTS SEG NFR BLD AUTO: 66 % (ref 43–75)
NRBC BLD AUTO-RTO: 0 /100 WBCS
NT-PROBNP SERPL-MCNC: 5012 PG/ML
P AXIS: 73 DEGREES
PLATELET # BLD AUTO: 141 THOUSANDS/UL (ref 149–390)
PMV BLD AUTO: 10 FL (ref 8.9–12.7)
POTASSIUM SERPL-SCNC: 4.6 MMOL/L (ref 3.5–5.3)
PR INTERVAL: 168 MS
PROT SERPL-MCNC: 6.9 G/DL (ref 6.4–8.2)
QRS AXIS: 18 DEGREES
QRSD INTERVAL: 94 MS
QT INTERVAL: 408 MS
QTC INTERVAL: 455 MS
RBC # BLD AUTO: 3.76 MILLION/UL (ref 3.88–5.62)
SODIUM SERPL-SCNC: 140 MMOL/L (ref 136–145)
T WAVE AXIS: 71 DEGREES
TROPONIN I SERPL-MCNC: <0.02 NG/ML
VENTRICULAR RATE: 75 BPM
WBC # BLD AUTO: 5.91 THOUSAND/UL (ref 4.31–10.16)

## 2020-10-05 PROCEDURE — 80053 COMPREHEN METABOLIC PANEL: CPT | Performed by: EMERGENCY MEDICINE

## 2020-10-05 PROCEDURE — 71046 X-RAY EXAM CHEST 2 VIEWS: CPT

## 2020-10-05 PROCEDURE — 3066F NEPHROPATHY DOC TX: CPT | Performed by: PODIATRIST

## 2020-10-05 PROCEDURE — 82948 REAGENT STRIP/BLOOD GLUCOSE: CPT

## 2020-10-05 PROCEDURE — 99285 EMERGENCY DEPT VISIT HI MDM: CPT

## 2020-10-05 PROCEDURE — 85025 COMPLETE CBC W/AUTO DIFF WBC: CPT | Performed by: EMERGENCY MEDICINE

## 2020-10-05 PROCEDURE — 99285 EMERGENCY DEPT VISIT HI MDM: CPT | Performed by: EMERGENCY MEDICINE

## 2020-10-05 PROCEDURE — 36415 COLL VENOUS BLD VENIPUNCTURE: CPT | Performed by: EMERGENCY MEDICINE

## 2020-10-05 PROCEDURE — 93005 ELECTROCARDIOGRAM TRACING: CPT

## 2020-10-05 PROCEDURE — 83880 ASSAY OF NATRIURETIC PEPTIDE: CPT | Performed by: EMERGENCY MEDICINE

## 2020-10-05 PROCEDURE — 93010 ELECTROCARDIOGRAM REPORT: CPT | Performed by: INTERNAL MEDICINE

## 2020-10-05 PROCEDURE — 84484 ASSAY OF TROPONIN QUANT: CPT | Performed by: EMERGENCY MEDICINE

## 2020-10-05 PROCEDURE — 99254 IP/OBS CNSLTJ NEW/EST MOD 60: CPT | Performed by: INTERNAL MEDICINE

## 2020-10-05 RX ORDER — FUROSEMIDE 10 MG/ML
80 INJECTION INTRAMUSCULAR; INTRAVENOUS
Status: DISCONTINUED | OUTPATIENT
Start: 2020-10-06 | End: 2020-10-05

## 2020-10-05 RX ORDER — SODIUM BICARBONATE 650 MG/1
650 TABLET ORAL
Status: DISCONTINUED | OUTPATIENT
Start: 2020-10-05 | End: 2020-10-06

## 2020-10-05 RX ORDER — ASPIRIN 81 MG/1
81 TABLET ORAL DAILY
Status: DISCONTINUED | OUTPATIENT
Start: 2020-10-06 | End: 2020-10-09 | Stop reason: HOSPADM

## 2020-10-05 RX ORDER — ATORVASTATIN CALCIUM 40 MG/1
40 TABLET, FILM COATED ORAL
Status: DISCONTINUED | OUTPATIENT
Start: 2020-10-05 | End: 2020-10-09 | Stop reason: HOSPADM

## 2020-10-05 RX ORDER — FUROSEMIDE 10 MG/ML
80 INJECTION INTRAMUSCULAR; INTRAVENOUS ONCE
Status: COMPLETED | OUTPATIENT
Start: 2020-10-05 | End: 2020-10-05

## 2020-10-05 RX ORDER — AMLODIPINE BESYLATE 5 MG/1
5 TABLET ORAL EVERY 12 HOURS
Status: DISCONTINUED | OUTPATIENT
Start: 2020-10-05 | End: 2020-10-07

## 2020-10-05 RX ORDER — AMLODIPINE BESYLATE 5 MG/1
5 TABLET ORAL EVERY 12 HOURS
Status: DISCONTINUED | OUTPATIENT
Start: 2020-10-05 | End: 2020-10-05

## 2020-10-05 RX ORDER — FUROSEMIDE 10 MG/ML
80 INJECTION INTRAMUSCULAR; INTRAVENOUS EVERY 8 HOURS
Status: DISCONTINUED | OUTPATIENT
Start: 2020-10-05 | End: 2020-10-08

## 2020-10-05 RX ORDER — INSULIN GLARGINE 100 [IU]/ML
14 INJECTION, SOLUTION SUBCUTANEOUS EVERY MORNING
Status: DISCONTINUED | OUTPATIENT
Start: 2020-10-06 | End: 2020-10-09 | Stop reason: HOSPADM

## 2020-10-05 RX ORDER — CARVEDILOL 12.5 MG/1
12.5 TABLET ORAL 2 TIMES DAILY WITH MEALS
Status: DISCONTINUED | OUTPATIENT
Start: 2020-10-05 | End: 2020-10-09 | Stop reason: HOSPADM

## 2020-10-05 RX ORDER — HYDRALAZINE HYDROCHLORIDE 20 MG/ML
5 INJECTION INTRAMUSCULAR; INTRAVENOUS EVERY 6 HOURS PRN
Status: DISCONTINUED | OUTPATIENT
Start: 2020-10-05 | End: 2020-10-09 | Stop reason: HOSPADM

## 2020-10-05 RX ADMIN — FUROSEMIDE 80 MG: 10 INJECTION, SOLUTION INTRAMUSCULAR; INTRAVENOUS at 18:19

## 2020-10-05 RX ADMIN — AMLODIPINE BESYLATE 5 MG: 5 TABLET ORAL at 20:20

## 2020-10-05 RX ADMIN — NITROGLYCERIN 1 INCH: 20 OINTMENT TOPICAL at 16:17

## 2020-10-05 RX ADMIN — CARVEDILOL 12.5 MG: 12.5 TABLET, FILM COATED ORAL at 18:18

## 2020-10-05 RX ADMIN — SODIUM BICARBONATE 650 MG: 650 TABLET ORAL at 18:18

## 2020-10-05 RX ADMIN — INSULIN LISPRO 1 UNITS: 100 INJECTION, SOLUTION INTRAVENOUS; SUBCUTANEOUS at 18:30

## 2020-10-05 RX ADMIN — ATORVASTATIN CALCIUM 40 MG: 40 TABLET, FILM COATED ORAL at 18:18

## 2020-10-06 ENCOUNTER — APPOINTMENT (INPATIENT)
Dept: NON INVASIVE DIAGNOSTICS | Facility: HOSPITAL | Age: 60
DRG: 683 | End: 2020-10-06
Payer: COMMERCIAL

## 2020-10-06 LAB
ANION GAP SERPL CALCULATED.3IONS-SCNC: 15 MMOL/L (ref 4–13)
BUN SERPL-MCNC: 81 MG/DL (ref 5–25)
CALCIUM SERPL-MCNC: 7.8 MG/DL (ref 8.3–10.1)
CHLORIDE SERPL-SCNC: 108 MMOL/L (ref 100–108)
CO2 SERPL-SCNC: 19 MMOL/L (ref 21–32)
CREAT SERPL-MCNC: 6.24 MG/DL (ref 0.6–1.3)
ERYTHROCYTE [DISTWIDTH] IN BLOOD BY AUTOMATED COUNT: 13.8 % (ref 11.6–15.1)
FERRITIN SERPL-MCNC: 83 NG/ML (ref 8–388)
GFR SERPL CREATININE-BSD FRML MDRD: 9 ML/MIN/1.73SQ M
GLUCOSE SERPL-MCNC: 142 MG/DL (ref 65–140)
GLUCOSE SERPL-MCNC: 74 MG/DL (ref 65–140)
GLUCOSE SERPL-MCNC: 81 MG/DL (ref 65–140)
GLUCOSE SERPL-MCNC: 96 MG/DL (ref 65–140)
HCT VFR BLD AUTO: 32.9 % (ref 36.5–49.3)
HGB BLD-MCNC: 10.3 G/DL (ref 12–17)
IRON SATN MFR SERPL: 20 %
IRON SERPL-MCNC: 43 UG/DL (ref 65–175)
MAGNESIUM SERPL-MCNC: 1.7 MG/DL (ref 1.6–2.6)
MCH RBC QN AUTO: 28 PG (ref 26.8–34.3)
MCHC RBC AUTO-ENTMCNC: 31.3 G/DL (ref 31.4–37.4)
MCV RBC AUTO: 89 FL (ref 82–98)
PHOSPHATE SERPL-MCNC: 5.9 MG/DL (ref 2.3–4.1)
PLATELET # BLD AUTO: 152 THOUSANDS/UL (ref 149–390)
PMV BLD AUTO: 10.6 FL (ref 8.9–12.7)
POTASSIUM SERPL-SCNC: 4 MMOL/L (ref 3.5–5.3)
RBC # BLD AUTO: 3.68 MILLION/UL (ref 3.88–5.62)
SODIUM SERPL-SCNC: 142 MMOL/L (ref 136–145)
TIBC SERPL-MCNC: 218 UG/DL (ref 250–450)
WBC # BLD AUTO: 6.07 THOUSAND/UL (ref 4.31–10.16)

## 2020-10-06 PROCEDURE — 82728 ASSAY OF FERRITIN: CPT | Performed by: PHYSICIAN ASSISTANT

## 2020-10-06 PROCEDURE — 82948 REAGENT STRIP/BLOOD GLUCOSE: CPT

## 2020-10-06 PROCEDURE — 87340 HEPATITIS B SURFACE AG IA: CPT | Performed by: PHYSICIAN ASSISTANT

## 2020-10-06 PROCEDURE — 86704 HEP B CORE ANTIBODY TOTAL: CPT | Performed by: PHYSICIAN ASSISTANT

## 2020-10-06 PROCEDURE — 86706 HEP B SURFACE ANTIBODY: CPT | Performed by: PHYSICIAN ASSISTANT

## 2020-10-06 PROCEDURE — 99233 SBSQ HOSP IP/OBS HIGH 50: CPT | Performed by: INTERNAL MEDICINE

## 2020-10-06 PROCEDURE — 99232 SBSQ HOSP IP/OBS MODERATE 35: CPT | Performed by: HOSPITALIST

## 2020-10-06 PROCEDURE — 85027 COMPLETE CBC AUTOMATED: CPT | Performed by: PHYSICIAN ASSISTANT

## 2020-10-06 PROCEDURE — 83540 ASSAY OF IRON: CPT | Performed by: PHYSICIAN ASSISTANT

## 2020-10-06 PROCEDURE — 86705 HEP B CORE ANTIBODY IGM: CPT | Performed by: PHYSICIAN ASSISTANT

## 2020-10-06 PROCEDURE — 80048 BASIC METABOLIC PNL TOTAL CA: CPT | Performed by: PHYSICIAN ASSISTANT

## 2020-10-06 PROCEDURE — 83550 IRON BINDING TEST: CPT | Performed by: PHYSICIAN ASSISTANT

## 2020-10-06 PROCEDURE — 93306 TTE W/DOPPLER COMPLETE: CPT

## 2020-10-06 PROCEDURE — 93306 TTE W/DOPPLER COMPLETE: CPT | Performed by: INTERNAL MEDICINE

## 2020-10-06 PROCEDURE — 83735 ASSAY OF MAGNESIUM: CPT | Performed by: PHYSICIAN ASSISTANT

## 2020-10-06 PROCEDURE — 84100 ASSAY OF PHOSPHORUS: CPT | Performed by: PHYSICIAN ASSISTANT

## 2020-10-06 PROCEDURE — 86803 HEPATITIS C AB TEST: CPT | Performed by: PHYSICIAN ASSISTANT

## 2020-10-06 RX ORDER — SODIUM BICARBONATE 650 MG/1
1300 TABLET ORAL
Status: DISCONTINUED | OUTPATIENT
Start: 2020-10-06 | End: 2020-10-08

## 2020-10-06 RX ORDER — CALCIUM ACETATE 667 MG/1
667 CAPSULE ORAL
Status: DISCONTINUED | OUTPATIENT
Start: 2020-10-06 | End: 2020-10-09 | Stop reason: HOSPADM

## 2020-10-06 RX ORDER — CHOLECALCIFEROL (VITAMIN D3) 10 MCG
1 TABLET ORAL
Status: DISCONTINUED | OUTPATIENT
Start: 2020-10-06 | End: 2020-10-09 | Stop reason: HOSPADM

## 2020-10-06 RX ADMIN — FUROSEMIDE 80 MG: 10 INJECTION, SOLUTION INTRAMUSCULAR; INTRAVENOUS at 02:02

## 2020-10-06 RX ADMIN — INSULIN GLARGINE 14 UNITS: 100 INJECTION, SOLUTION SUBCUTANEOUS at 08:10

## 2020-10-06 RX ADMIN — CARVEDILOL 12.5 MG: 12.5 TABLET, FILM COATED ORAL at 08:10

## 2020-10-06 RX ADMIN — Medication 1 CAPSULE: at 18:00

## 2020-10-06 RX ADMIN — CARVEDILOL 12.5 MG: 12.5 TABLET, FILM COATED ORAL at 17:59

## 2020-10-06 RX ADMIN — SODIUM BICARBONATE 1300 MG: 650 TABLET ORAL at 08:10

## 2020-10-06 RX ADMIN — ATORVASTATIN CALCIUM 40 MG: 40 TABLET, FILM COATED ORAL at 18:00

## 2020-10-06 RX ADMIN — SODIUM BICARBONATE 1300 MG: 650 TABLET ORAL at 18:00

## 2020-10-06 RX ADMIN — AMLODIPINE BESYLATE 5 MG: 5 TABLET ORAL at 08:10

## 2020-10-06 RX ADMIN — CALCIUM ACETATE 667 MG: 667 CAPSULE ORAL at 18:00

## 2020-10-06 RX ADMIN — FUROSEMIDE 80 MG: 10 INJECTION, SOLUTION INTRAMUSCULAR; INTRAVENOUS at 10:53

## 2020-10-06 RX ADMIN — FUROSEMIDE 80 MG: 10 INJECTION, SOLUTION INTRAMUSCULAR; INTRAVENOUS at 17:59

## 2020-10-06 RX ADMIN — ASPIRIN 81 MG: 81 TABLET, COATED ORAL at 08:10

## 2020-10-07 ENCOUNTER — APPOINTMENT (INPATIENT)
Dept: DIALYSIS | Facility: HOSPITAL | Age: 60
DRG: 683 | End: 2020-10-07
Payer: COMMERCIAL

## 2020-10-07 LAB
ANION GAP SERPL CALCULATED.3IONS-SCNC: 16 MMOL/L (ref 4–13)
BUN SERPL-MCNC: 87 MG/DL (ref 5–25)
CALCIUM SERPL-MCNC: 8.4 MG/DL (ref 8.3–10.1)
CHLORIDE SERPL-SCNC: 106 MMOL/L (ref 100–108)
CO2 SERPL-SCNC: 19 MMOL/L (ref 21–32)
CREAT SERPL-MCNC: 6.82 MG/DL (ref 0.6–1.3)
ERYTHROCYTE [DISTWIDTH] IN BLOOD BY AUTOMATED COUNT: 14 % (ref 11.6–15.1)
GFR SERPL CREATININE-BSD FRML MDRD: 8 ML/MIN/1.73SQ M
GLUCOSE SERPL-MCNC: 133 MG/DL (ref 65–140)
GLUCOSE SERPL-MCNC: 178 MG/DL (ref 65–140)
GLUCOSE SERPL-MCNC: 77 MG/DL (ref 65–140)
GLUCOSE SERPL-MCNC: 79 MG/DL (ref 65–140)
GLUCOSE SERPL-MCNC: 82 MG/DL (ref 65–140)
HBV CORE AB SER QL: NORMAL
HBV CORE IGM SER QL: NORMAL
HBV SURFACE AB SER-ACNC: <3.1 MIU/ML
HBV SURFACE AG SER QL: NORMAL
HCT VFR BLD AUTO: 37 % (ref 36.5–49.3)
HCV AB SER QL: NORMAL
HGB BLD-MCNC: 11.5 G/DL (ref 12–17)
MCH RBC QN AUTO: 27.4 PG (ref 26.8–34.3)
MCHC RBC AUTO-ENTMCNC: 31.1 G/DL (ref 31.4–37.4)
MCV RBC AUTO: 88 FL (ref 82–98)
PLATELET # BLD AUTO: 160 THOUSANDS/UL (ref 149–390)
PMV BLD AUTO: 10.8 FL (ref 8.9–12.7)
POTASSIUM SERPL-SCNC: 3.9 MMOL/L (ref 3.5–5.3)
RBC # BLD AUTO: 4.19 MILLION/UL (ref 3.88–5.62)
SODIUM SERPL-SCNC: 141 MMOL/L (ref 136–145)
WBC # BLD AUTO: 6.56 THOUSAND/UL (ref 4.31–10.16)

## 2020-10-07 PROCEDURE — 99232 SBSQ HOSP IP/OBS MODERATE 35: CPT | Performed by: HOSPITALIST

## 2020-10-07 PROCEDURE — 85027 COMPLETE CBC AUTOMATED: CPT | Performed by: INTERNAL MEDICINE

## 2020-10-07 PROCEDURE — 5A1D70Z PERFORMANCE OF URINARY FILTRATION, INTERMITTENT, LESS THAN 6 HOURS PER DAY: ICD-10-PCS | Performed by: INTERNAL MEDICINE

## 2020-10-07 PROCEDURE — 80048 BASIC METABOLIC PNL TOTAL CA: CPT | Performed by: PHYSICIAN ASSISTANT

## 2020-10-07 PROCEDURE — 82948 REAGENT STRIP/BLOOD GLUCOSE: CPT

## 2020-10-07 PROCEDURE — 90935 HEMODIALYSIS ONE EVALUATION: CPT | Performed by: INTERNAL MEDICINE

## 2020-10-07 RX ORDER — LANOLIN ALCOHOL/MO/W.PET/CERES
3 CREAM (GRAM) TOPICAL
Status: DISCONTINUED | OUTPATIENT
Start: 2020-10-07 | End: 2020-10-09 | Stop reason: HOSPADM

## 2020-10-07 RX ORDER — TORSEMIDE 100 MG/1
50 TABLET ORAL DAILY
Status: DISCONTINUED | OUTPATIENT
Start: 2020-10-08 | End: 2020-10-09 | Stop reason: HOSPADM

## 2020-10-07 RX ORDER — AMLODIPINE BESYLATE 5 MG/1
5 TABLET ORAL EVERY 12 HOURS SCHEDULED
Status: DISCONTINUED | OUTPATIENT
Start: 2020-10-08 | End: 2020-10-07

## 2020-10-07 RX ORDER — AMLODIPINE BESYLATE 5 MG/1
5 TABLET ORAL EVERY 12 HOURS SCHEDULED
Status: DISCONTINUED | OUTPATIENT
Start: 2020-10-07 | End: 2020-10-08

## 2020-10-07 RX ORDER — PANTOPRAZOLE SODIUM 40 MG/1
40 TABLET, DELAYED RELEASE ORAL
Status: DISCONTINUED | OUTPATIENT
Start: 2020-10-07 | End: 2020-10-09 | Stop reason: HOSPADM

## 2020-10-07 RX ADMIN — CALCIUM ACETATE 667 MG: 667 CAPSULE ORAL at 13:55

## 2020-10-07 RX ADMIN — PANTOPRAZOLE SODIUM 40 MG: 40 TABLET, DELAYED RELEASE ORAL at 17:40

## 2020-10-07 RX ADMIN — CARVEDILOL 12.5 MG: 12.5 TABLET, FILM COATED ORAL at 17:40

## 2020-10-07 RX ADMIN — ATORVASTATIN CALCIUM 40 MG: 40 TABLET, FILM COATED ORAL at 17:39

## 2020-10-07 RX ADMIN — CALCIUM ACETATE 667 MG: 667 CAPSULE ORAL at 17:40

## 2020-10-07 RX ADMIN — MELATONIN 3 MG: at 21:17

## 2020-10-07 RX ADMIN — INSULIN GLARGINE 14 UNITS: 100 INJECTION, SOLUTION SUBCUTANEOUS at 09:06

## 2020-10-07 RX ADMIN — AMLODIPINE BESYLATE 5 MG: 5 TABLET ORAL at 13:58

## 2020-10-07 RX ADMIN — INSULIN LISPRO 1 UNITS: 100 INJECTION, SOLUTION INTRAVENOUS; SUBCUTANEOUS at 18:00

## 2020-10-07 RX ADMIN — FUROSEMIDE 80 MG: 10 INJECTION, SOLUTION INTRAMUSCULAR; INTRAVENOUS at 13:58

## 2020-10-07 RX ADMIN — Medication 1 CAPSULE: at 17:40

## 2020-10-07 RX ADMIN — CARVEDILOL 12.5 MG: 12.5 TABLET, FILM COATED ORAL at 13:56

## 2020-10-07 RX ADMIN — FUROSEMIDE 80 MG: 10 INJECTION, SOLUTION INTRAMUSCULAR; INTRAVENOUS at 17:46

## 2020-10-07 RX ADMIN — FUROSEMIDE 80 MG: 10 INJECTION, SOLUTION INTRAMUSCULAR; INTRAVENOUS at 03:44

## 2020-10-07 RX ADMIN — ASPIRIN 81 MG: 81 TABLET, COATED ORAL at 13:57

## 2020-10-08 ENCOUNTER — APPOINTMENT (INPATIENT)
Dept: DIALYSIS | Facility: HOSPITAL | Age: 60
DRG: 683 | End: 2020-10-08
Attending: INTERNAL MEDICINE
Payer: COMMERCIAL

## 2020-10-08 LAB
ANION GAP SERPL CALCULATED.3IONS-SCNC: 14 MMOL/L (ref 4–13)
BUN SERPL-MCNC: 76 MG/DL (ref 5–25)
CALCIUM SERPL-MCNC: 8.4 MG/DL (ref 8.3–10.1)
CHLORIDE SERPL-SCNC: 104 MMOL/L (ref 100–108)
CO2 SERPL-SCNC: 22 MMOL/L (ref 21–32)
CREAT SERPL-MCNC: 6.32 MG/DL (ref 0.6–1.3)
ERYTHROCYTE [DISTWIDTH] IN BLOOD BY AUTOMATED COUNT: 13.7 % (ref 11.6–15.1)
GFR SERPL CREATININE-BSD FRML MDRD: 9 ML/MIN/1.73SQ M
GLUCOSE SERPL-MCNC: 102 MG/DL (ref 65–140)
GLUCOSE SERPL-MCNC: 103 MG/DL (ref 65–140)
GLUCOSE SERPL-MCNC: 104 MG/DL (ref 65–140)
GLUCOSE SERPL-MCNC: 131 MG/DL (ref 65–140)
GLUCOSE SERPL-MCNC: 226 MG/DL (ref 65–140)
HCT VFR BLD AUTO: 36.5 % (ref 36.5–49.3)
HGB BLD-MCNC: 11.7 G/DL (ref 12–17)
MCH RBC QN AUTO: 27.9 PG (ref 26.8–34.3)
MCHC RBC AUTO-ENTMCNC: 32.1 G/DL (ref 31.4–37.4)
MCV RBC AUTO: 87 FL (ref 82–98)
PLATELET # BLD AUTO: 154 THOUSANDS/UL (ref 149–390)
PMV BLD AUTO: 10.8 FL (ref 8.9–12.7)
POTASSIUM SERPL-SCNC: 4.2 MMOL/L (ref 3.5–5.3)
RBC # BLD AUTO: 4.19 MILLION/UL (ref 3.88–5.62)
SARS-COV-2 RNA RESP QL NAA+PROBE: NEGATIVE
SODIUM SERPL-SCNC: 140 MMOL/L (ref 136–145)
WBC # BLD AUTO: 7 THOUSAND/UL (ref 4.31–10.16)

## 2020-10-08 PROCEDURE — 99232 SBSQ HOSP IP/OBS MODERATE 35: CPT | Performed by: HOSPITALIST

## 2020-10-08 PROCEDURE — 87635 SARS-COV-2 COVID-19 AMP PRB: CPT | Performed by: INTERNAL MEDICINE

## 2020-10-08 PROCEDURE — 90935 HEMODIALYSIS ONE EVALUATION: CPT | Performed by: INTERNAL MEDICINE

## 2020-10-08 PROCEDURE — 85027 COMPLETE CBC AUTOMATED: CPT | Performed by: INTERNAL MEDICINE

## 2020-10-08 PROCEDURE — 80048 BASIC METABOLIC PNL TOTAL CA: CPT | Performed by: INTERNAL MEDICINE

## 2020-10-08 PROCEDURE — 87081 CULTURE SCREEN ONLY: CPT | Performed by: HOSPITALIST

## 2020-10-08 PROCEDURE — 5A1D70Z PERFORMANCE OF URINARY FILTRATION, INTERMITTENT, LESS THAN 6 HOURS PER DAY: ICD-10-PCS | Performed by: INTERNAL MEDICINE

## 2020-10-08 PROCEDURE — 82948 REAGENT STRIP/BLOOD GLUCOSE: CPT

## 2020-10-08 RX ORDER — DOCUSATE SODIUM 100 MG/1
100 CAPSULE, LIQUID FILLED ORAL 2 TIMES DAILY
Status: DISCONTINUED | OUTPATIENT
Start: 2020-10-08 | End: 2020-10-09 | Stop reason: HOSPADM

## 2020-10-08 RX ORDER — DICYCLOMINE HYDROCHLORIDE 10 MG/1
10 CAPSULE ORAL
Status: DISCONTINUED | OUTPATIENT
Start: 2020-10-08 | End: 2020-10-08

## 2020-10-08 RX ORDER — GABAPENTIN 100 MG/1
100 CAPSULE ORAL 3 TIMES DAILY
Status: DISCONTINUED | OUTPATIENT
Start: 2020-10-08 | End: 2020-10-09 | Stop reason: HOSPADM

## 2020-10-08 RX ORDER — POLYETHYLENE GLYCOL 3350 17 G/17G
17 POWDER, FOR SOLUTION ORAL DAILY PRN
Status: DISCONTINUED | OUTPATIENT
Start: 2020-10-08 | End: 2020-10-09 | Stop reason: HOSPADM

## 2020-10-08 RX ADMIN — AMLODIPINE BESYLATE 5 MG: 5 TABLET ORAL at 00:28

## 2020-10-08 RX ADMIN — CALCIUM ACETATE 667 MG: 667 CAPSULE ORAL at 08:38

## 2020-10-08 RX ADMIN — CARVEDILOL 12.5 MG: 12.5 TABLET, FILM COATED ORAL at 16:34

## 2020-10-08 RX ADMIN — INSULIN LISPRO 2 UNITS: 100 INJECTION, SOLUTION INTRAVENOUS; SUBCUTANEOUS at 16:32

## 2020-10-08 RX ADMIN — ASPIRIN 81 MG: 81 TABLET, COATED ORAL at 13:54

## 2020-10-08 RX ADMIN — DOCUSATE SODIUM 100 MG: 100 CAPSULE, LIQUID FILLED ORAL at 16:34

## 2020-10-08 RX ADMIN — Medication 1 CAPSULE: at 16:35

## 2020-10-08 RX ADMIN — TORSEMIDE 50 MG: 100 TABLET ORAL at 13:54

## 2020-10-08 RX ADMIN — ATORVASTATIN CALCIUM 40 MG: 40 TABLET, FILM COATED ORAL at 16:34

## 2020-10-08 RX ADMIN — CALCIUM ACETATE 667 MG: 667 CAPSULE ORAL at 13:53

## 2020-10-08 RX ADMIN — PANTOPRAZOLE SODIUM 40 MG: 40 TABLET, DELAYED RELEASE ORAL at 05:11

## 2020-10-08 RX ADMIN — INSULIN GLARGINE 14 UNITS: 100 INJECTION, SOLUTION SUBCUTANEOUS at 08:38

## 2020-10-08 RX ADMIN — CALCIUM ACETATE 667 MG: 667 CAPSULE ORAL at 16:35

## 2020-10-08 RX ADMIN — DICYCLOMINE HYDROCHLORIDE 10 MG: 10 CAPSULE ORAL at 16:34

## 2020-10-09 ENCOUNTER — APPOINTMENT (INPATIENT)
Dept: DIALYSIS | Facility: HOSPITAL | Age: 60
DRG: 683 | End: 2020-10-09
Payer: COMMERCIAL

## 2020-10-09 VITALS
SYSTOLIC BLOOD PRESSURE: 130 MMHG | HEIGHT: 70 IN | HEART RATE: 71 BPM | DIASTOLIC BLOOD PRESSURE: 72 MMHG | BODY MASS INDEX: 31.26 KG/M2 | OXYGEN SATURATION: 96 % | RESPIRATION RATE: 18 BRPM | TEMPERATURE: 97.8 F | WEIGHT: 218.4 LBS

## 2020-10-09 DIAGNOSIS — E53.8 B12 DEFICIENCY: ICD-10-CM

## 2020-10-09 LAB
ANION GAP SERPL CALCULATED.3IONS-SCNC: 15 MMOL/L (ref 4–13)
BUN SERPL-MCNC: 68 MG/DL (ref 5–25)
CALCIUM SERPL-MCNC: 8.6 MG/DL (ref 8.3–10.1)
CHLORIDE SERPL-SCNC: 101 MMOL/L (ref 100–108)
CO2 SERPL-SCNC: 22 MMOL/L (ref 21–32)
CREAT SERPL-MCNC: 6.26 MG/DL (ref 0.6–1.3)
ERYTHROCYTE [DISTWIDTH] IN BLOOD BY AUTOMATED COUNT: 13.7 % (ref 11.6–15.1)
GFR SERPL CREATININE-BSD FRML MDRD: 9 ML/MIN/1.73SQ M
GLUCOSE SERPL-MCNC: 115 MG/DL (ref 65–140)
GLUCOSE SERPL-MCNC: 122 MG/DL (ref 65–140)
GLUCOSE SERPL-MCNC: 187 MG/DL (ref 65–140)
HCT VFR BLD AUTO: 33.3 % (ref 36.5–49.3)
HGB BLD-MCNC: 10.9 G/DL (ref 12–17)
MCH RBC QN AUTO: 28.6 PG (ref 26.8–34.3)
MCHC RBC AUTO-ENTMCNC: 32.7 G/DL (ref 31.4–37.4)
MCV RBC AUTO: 87 FL (ref 82–98)
MRSA NOSE QL CULT: NORMAL
PLATELET # BLD AUTO: 140 THOUSANDS/UL (ref 149–390)
PMV BLD AUTO: 10.5 FL (ref 8.9–12.7)
POTASSIUM SERPL-SCNC: 3.8 MMOL/L (ref 3.5–5.3)
RBC # BLD AUTO: 3.81 MILLION/UL (ref 3.88–5.62)
SODIUM SERPL-SCNC: 138 MMOL/L (ref 136–145)
WBC # BLD AUTO: 6.95 THOUSAND/UL (ref 4.31–10.16)

## 2020-10-09 PROCEDURE — 80048 BASIC METABOLIC PNL TOTAL CA: CPT | Performed by: PHYSICIAN ASSISTANT

## 2020-10-09 PROCEDURE — 82948 REAGENT STRIP/BLOOD GLUCOSE: CPT

## 2020-10-09 PROCEDURE — 99232 SBSQ HOSP IP/OBS MODERATE 35: CPT | Performed by: INTERNAL MEDICINE

## 2020-10-09 PROCEDURE — 99239 HOSP IP/OBS DSCHRG MGMT >30: CPT | Performed by: HOSPITALIST

## 2020-10-09 PROCEDURE — 85027 COMPLETE CBC AUTOMATED: CPT | Performed by: INTERNAL MEDICINE

## 2020-10-09 RX ORDER — TORSEMIDE 10 MG/1
50 TABLET ORAL DAILY
Qty: 150 TABLET | Refills: 0 | Status: SHIPPED | OUTPATIENT
Start: 2020-10-10 | End: 2021-12-09

## 2020-10-09 RX ORDER — CALCIUM ACETATE 667 MG/1
667 CAPSULE ORAL
Qty: 90 CAPSULE | Refills: 0 | Status: SHIPPED | OUTPATIENT
Start: 2020-10-09 | End: 2021-12-09

## 2020-10-09 RX ADMIN — INSULIN GLARGINE 14 UNITS: 100 INJECTION, SOLUTION SUBCUTANEOUS at 12:36

## 2020-10-09 RX ADMIN — CALCIUM ACETATE 667 MG: 667 CAPSULE ORAL at 12:33

## 2020-10-09 RX ADMIN — ASPIRIN 81 MG: 81 TABLET, COATED ORAL at 12:33

## 2020-10-09 RX ADMIN — TORSEMIDE 50 MG: 100 TABLET ORAL at 12:33

## 2020-10-12 ENCOUNTER — TRANSITIONAL CARE MANAGEMENT (OUTPATIENT)
Dept: INTERNAL MEDICINE CLINIC | Facility: CLINIC | Age: 60
End: 2020-10-12

## 2020-10-12 RX ORDER — OMEGA-3/DHA/EPA/FISH OIL 35-113.5MG
TABLET,CHEWABLE ORAL
Qty: 90 TABLET | Refills: 1 | OUTPATIENT
Start: 2020-10-12

## 2020-10-26 DIAGNOSIS — I10 ESSENTIAL HYPERTENSION: Chronic | ICD-10-CM

## 2020-10-26 RX ORDER — CARVEDILOL 6.25 MG/1
6.25 TABLET ORAL 2 TIMES DAILY WITH MEALS
Qty: 180 TABLET | Refills: 0 | Status: SHIPPED | OUTPATIENT
Start: 2020-10-26 | End: 2021-08-10 | Stop reason: SDUPTHER

## 2020-10-28 ENCOUNTER — TELEPHONE (OUTPATIENT)
Dept: INTERNAL MEDICINE CLINIC | Facility: CLINIC | Age: 60
End: 2020-10-28

## 2020-10-28 ENCOUNTER — TELEMEDICINE (OUTPATIENT)
Dept: INTERNAL MEDICINE CLINIC | Facility: CLINIC | Age: 60
End: 2020-10-28
Payer: COMMERCIAL

## 2020-10-28 DIAGNOSIS — R09.81 NASAL CONGESTION WITH RHINORRHEA: Primary | ICD-10-CM

## 2020-10-28 DIAGNOSIS — J34.89 NASAL CONGESTION WITH RHINORRHEA: Primary | ICD-10-CM

## 2020-10-28 PROCEDURE — 99214 OFFICE O/P EST MOD 30 MIN: CPT | Performed by: INTERNAL MEDICINE

## 2020-10-28 RX ORDER — AZELASTINE 1 MG/ML
1 SPRAY, METERED NASAL 2 TIMES DAILY
Qty: 1 BOTTLE | Refills: 0 | Status: SHIPPED | OUTPATIENT
Start: 2020-10-28 | End: 2020-11-19

## 2020-10-29 DIAGNOSIS — N18.5 CHRONIC KIDNEY DISEASE (CKD), STAGE V (HCC): ICD-10-CM

## 2020-10-29 PROCEDURE — 3066F NEPHROPATHY DOC TX: CPT | Performed by: INTERNAL MEDICINE

## 2020-10-30 DIAGNOSIS — N18.5 CHRONIC KIDNEY DISEASE (CKD), STAGE V (HCC): ICD-10-CM

## 2020-10-30 RX ORDER — TORSEMIDE 10 MG/1
50 TABLET ORAL DAILY
Qty: 150 TABLET | Refills: 0 | OUTPATIENT
Start: 2020-10-30 | End: 2020-11-29

## 2020-11-02 DIAGNOSIS — N18.5 CHRONIC KIDNEY DISEASE (CKD), STAGE V (HCC): ICD-10-CM

## 2020-11-02 RX ORDER — TORSEMIDE 10 MG/1
50 TABLET ORAL DAILY
Qty: 150 TABLET | Refills: 0 | OUTPATIENT
Start: 2020-11-02 | End: 2020-12-02

## 2020-11-02 RX ORDER — CALCIUM ACETATE 667 MG/1
667 CAPSULE ORAL
Qty: 90 CAPSULE | Refills: 0 | OUTPATIENT
Start: 2020-11-02 | End: 2020-12-02

## 2020-11-19 DIAGNOSIS — R09.81 NASAL CONGESTION WITH RHINORRHEA: ICD-10-CM

## 2020-11-19 DIAGNOSIS — J34.89 NASAL CONGESTION WITH RHINORRHEA: ICD-10-CM

## 2020-11-19 RX ORDER — AZELASTINE 1 MG/ML
1 SPRAY, METERED NASAL 2 TIMES DAILY
Qty: 1 BOTTLE | Refills: 2 | Status: SHIPPED | OUTPATIENT
Start: 2020-11-19 | End: 2020-12-27 | Stop reason: HOSPADM

## 2020-11-22 ENCOUNTER — NURSE TRIAGE (OUTPATIENT)
Dept: OTHER | Facility: OTHER | Age: 60
End: 2020-11-22

## 2020-11-22 DIAGNOSIS — Z11.59 SPECIAL SCREENING EXAMINATION FOR UNSPECIFIED VIRAL DISEASE: Primary | ICD-10-CM

## 2020-11-23 DIAGNOSIS — Z11.59 SPECIAL SCREENING EXAMINATION FOR UNSPECIFIED VIRAL DISEASE: ICD-10-CM

## 2020-11-23 PROCEDURE — U0003 INFECTIOUS AGENT DETECTION BY NUCLEIC ACID (DNA OR RNA); SEVERE ACUTE RESPIRATORY SYNDROME CORONAVIRUS 2 (SARS-COV-2) (CORONAVIRUS DISEASE [COVID-19]), AMPLIFIED PROBE TECHNIQUE, MAKING USE OF HIGH THROUGHPUT TECHNOLOGIES AS DESCRIBED BY CMS-2020-01-R: HCPCS | Performed by: INTERNAL MEDICINE

## 2020-11-24 ENCOUNTER — TELEPHONE (OUTPATIENT)
Dept: INTERNAL MEDICINE CLINIC | Facility: CLINIC | Age: 60
End: 2020-11-24

## 2020-11-24 LAB — SARS-COV-2 RNA SPEC QL NAA+PROBE: NOT DETECTED

## 2020-11-25 ENCOUNTER — TELEMEDICINE (OUTPATIENT)
Dept: INTERNAL MEDICINE CLINIC | Facility: CLINIC | Age: 60
End: 2020-11-25
Payer: COMMERCIAL

## 2020-11-25 DIAGNOSIS — Z20.822 CLOSE EXPOSURE TO COVID-19 VIRUS: Primary | ICD-10-CM

## 2020-11-25 PROCEDURE — 1036F TOBACCO NON-USER: CPT | Performed by: INTERNAL MEDICINE

## 2020-11-25 PROCEDURE — 99212 OFFICE O/P EST SF 10 MIN: CPT | Performed by: INTERNAL MEDICINE

## 2020-11-30 ENCOUNTER — HOSPITAL ENCOUNTER (EMERGENCY)
Facility: HOSPITAL | Age: 60
Discharge: HOME | End: 2020-11-30
Attending: EMERGENCY MEDICINE | Admitting: EMERGENCY MEDICINE
Payer: COMMERCIAL

## 2020-11-30 ENCOUNTER — APPOINTMENT (EMERGENCY)
Dept: CT IMAGING | Facility: HOSPITAL | Age: 60
End: 2020-11-30
Payer: COMMERCIAL

## 2020-11-30 VITALS
DIASTOLIC BLOOD PRESSURE: 74 MMHG | HEART RATE: 74 BPM | RESPIRATION RATE: 18 BRPM | WEIGHT: 218 LBS | OXYGEN SATURATION: 98 % | BODY MASS INDEX: 31.28 KG/M2 | TEMPERATURE: 98.8 F | SYSTOLIC BLOOD PRESSURE: 169 MMHG

## 2020-11-30 DIAGNOSIS — S16.1XXA STRAIN OF NECK MUSCLE, INITIAL ENCOUNTER: Primary | ICD-10-CM

## 2020-11-30 LAB
ANION GAP SERPL CALCULATED.3IONS-SCNC: 13 MMOL/L (ref 4–13)
ATRIAL RATE: 74 BPM
BASOPHILS # BLD AUTO: 0.02 THOUSANDS/ΜL (ref 0–0.1)
BASOPHILS NFR BLD AUTO: 0 % (ref 0–1)
BUN SERPL-MCNC: 82 MG/DL (ref 5–25)
CALCIUM SERPL-MCNC: 8.6 MG/DL (ref 8.3–10.1)
CHLORIDE SERPL-SCNC: 101 MMOL/L (ref 100–108)
CO2 SERPL-SCNC: 22 MMOL/L (ref 21–32)
CREAT SERPL-MCNC: 7.41 MG/DL (ref 0.6–1.3)
EOSINOPHIL # BLD AUTO: 0.13 THOUSAND/ΜL (ref 0–0.61)
EOSINOPHIL NFR BLD AUTO: 2 % (ref 0–6)
ERYTHROCYTE [DISTWIDTH] IN BLOOD BY AUTOMATED COUNT: 15.2 % (ref 11.6–15.1)
GFR SERPL CREATININE-BSD FRML MDRD: 7 ML/MIN/1.73SQ M
GLUCOSE SERPL-MCNC: 155 MG/DL (ref 65–140)
HCT VFR BLD AUTO: 34.1 % (ref 36.5–49.3)
HGB BLD-MCNC: 10.9 G/DL (ref 12–17)
IMM GRANULOCYTES # BLD AUTO: 0.08 THOUSAND/UL (ref 0–0.2)
IMM GRANULOCYTES NFR BLD AUTO: 1 % (ref 0–2)
LYMPHOCYTES # BLD AUTO: 1.6 THOUSANDS/ΜL (ref 0.6–4.47)
LYMPHOCYTES NFR BLD AUTO: 23 % (ref 14–44)
MCH RBC QN AUTO: 29.5 PG (ref 26.8–34.3)
MCHC RBC AUTO-ENTMCNC: 32 G/DL (ref 31.4–37.4)
MCV RBC AUTO: 92 FL (ref 82–98)
MONOCYTES # BLD AUTO: 0.64 THOUSAND/ΜL (ref 0.17–1.22)
MONOCYTES NFR BLD AUTO: 9 % (ref 4–12)
NEUTROPHILS # BLD AUTO: 4.37 THOUSANDS/ΜL (ref 1.85–7.62)
NEUTS SEG NFR BLD AUTO: 65 % (ref 43–75)
NRBC BLD AUTO-RTO: 0 /100 WBCS
P AXIS: 53 DEGREES
PLATELET # BLD AUTO: 134 THOUSANDS/UL (ref 149–390)
PMV BLD AUTO: 10.2 FL (ref 8.9–12.7)
POTASSIUM SERPL-SCNC: 4.2 MMOL/L (ref 3.5–5.3)
PR INTERVAL: 184 MS
QRS AXIS: 2 DEGREES
QRSD INTERVAL: 106 MS
QT INTERVAL: 406 MS
QTC INTERVAL: 450 MS
RBC # BLD AUTO: 3.7 MILLION/UL (ref 3.88–5.62)
SODIUM SERPL-SCNC: 136 MMOL/L (ref 136–145)
T WAVE AXIS: 31 DEGREES
TROPONIN I SERPL-MCNC: <0.02 NG/ML
VENTRICULAR RATE: 74 BPM
WBC # BLD AUTO: 6.84 THOUSAND/UL (ref 4.31–10.16)

## 2020-11-30 PROCEDURE — 99285 EMERGENCY DEPT VISIT HI MDM: CPT | Performed by: EMERGENCY MEDICINE

## 2020-11-30 PROCEDURE — 85025 COMPLETE CBC W/AUTO DIFF WBC: CPT | Performed by: EMERGENCY MEDICINE

## 2020-11-30 PROCEDURE — 36415 COLL VENOUS BLD VENIPUNCTURE: CPT | Performed by: EMERGENCY MEDICINE

## 2020-11-30 PROCEDURE — 93005 ELECTROCARDIOGRAM TRACING: CPT

## 2020-11-30 PROCEDURE — 70491 CT SOFT TISSUE NECK W/DYE: CPT

## 2020-11-30 PROCEDURE — 84484 ASSAY OF TROPONIN QUANT: CPT | Performed by: EMERGENCY MEDICINE

## 2020-11-30 PROCEDURE — 93010 ELECTROCARDIOGRAM REPORT: CPT | Performed by: INTERNAL MEDICINE

## 2020-11-30 PROCEDURE — G1004 CDSM NDSC: HCPCS

## 2020-11-30 PROCEDURE — 80048 BASIC METABOLIC PNL TOTAL CA: CPT | Performed by: EMERGENCY MEDICINE

## 2020-11-30 PROCEDURE — 99284 EMERGENCY DEPT VISIT MOD MDM: CPT

## 2020-11-30 RX ORDER — METHOCARBAMOL 500 MG/1
500 TABLET, FILM COATED ORAL ONCE
Status: COMPLETED | OUTPATIENT
Start: 2020-11-30 | End: 2020-11-30

## 2020-11-30 RX ORDER — LIDOCAINE 50 MG/G
1 PATCH TOPICAL ONCE
Status: DISCONTINUED | OUTPATIENT
Start: 2020-11-30 | End: 2020-11-30 | Stop reason: HOSPADM

## 2020-11-30 RX ORDER — ACETAMINOPHEN 325 MG/1
975 TABLET ORAL ONCE
Status: COMPLETED | OUTPATIENT
Start: 2020-11-30 | End: 2020-11-30

## 2020-11-30 RX ADMIN — LIDOCAINE 5% 1 PATCH: 700 PATCH TOPICAL at 11:15

## 2020-11-30 RX ADMIN — METHOCARBAMOL 500 MG: 500 TABLET ORAL at 11:16

## 2020-11-30 RX ADMIN — IOHEXOL 85 ML: 350 INJECTION, SOLUTION INTRAVENOUS at 09:10

## 2020-11-30 RX ADMIN — ACETAMINOPHEN 975 MG: 325 TABLET, FILM COATED ORAL at 08:04

## 2020-12-01 ENCOUNTER — OFFICE VISIT (OUTPATIENT)
Dept: PODIATRY | Facility: CLINIC | Age: 60
End: 2020-12-01
Payer: COMMERCIAL

## 2020-12-01 VITALS
WEIGHT: 228 LBS | SYSTOLIC BLOOD PRESSURE: 147 MMHG | DIASTOLIC BLOOD PRESSURE: 86 MMHG | HEIGHT: 70 IN | HEART RATE: 76 BPM | BODY MASS INDEX: 32.64 KG/M2

## 2020-12-01 DIAGNOSIS — E11.42 DIABETIC POLYNEUROPATHY ASSOCIATED WITH TYPE 2 DIABETES MELLITUS (HCC): Primary | ICD-10-CM

## 2020-12-01 DIAGNOSIS — Z89.421 ABSENCE OF TOE OF RIGHT FOOT (HCC): ICD-10-CM

## 2020-12-01 PROCEDURE — 99213 OFFICE O/P EST LOW 20 MIN: CPT | Performed by: PODIATRIST

## 2020-12-01 PROCEDURE — 3008F BODY MASS INDEX DOCD: CPT | Performed by: PODIATRIST

## 2020-12-01 PROCEDURE — 3079F DIAST BP 80-89 MM HG: CPT | Performed by: PODIATRIST

## 2020-12-01 PROCEDURE — 1036F TOBACCO NON-USER: CPT | Performed by: PODIATRIST

## 2020-12-01 PROCEDURE — 3077F SYST BP >= 140 MM HG: CPT | Performed by: PODIATRIST

## 2020-12-02 ENCOUNTER — VBI (OUTPATIENT)
Dept: INTERNAL MEDICINE CLINIC | Facility: CLINIC | Age: 60
End: 2020-12-02

## 2020-12-03 RX ORDER — CALCIUM ACETATE 667 MG/1
667 CAPSULE ORAL
Qty: 90 CAPSULE | Refills: 0 | OUTPATIENT
Start: 2020-12-03 | End: 2021-01-02

## 2020-12-03 RX ORDER — TORSEMIDE 10 MG/1
50 TABLET ORAL DAILY
Qty: 150 TABLET | Refills: 0 | OUTPATIENT
Start: 2020-12-03 | End: 2021-01-02

## 2020-12-05 DIAGNOSIS — E53.8 B12 DEFICIENCY: ICD-10-CM

## 2020-12-06 RX ORDER — OMEGA-3/DHA/EPA/FISH OIL 35-113.5MG
TABLET,CHEWABLE ORAL
Qty: 90 TABLET | Refills: 1 | Status: SHIPPED | OUTPATIENT
Start: 2020-12-06 | End: 2020-12-27 | Stop reason: HOSPADM

## 2020-12-15 DIAGNOSIS — I69.30 HISTORY OF ISCHEMIC CEREBROVASCULAR ACCIDENT (CVA) WITH RESIDUAL DEFICIT: ICD-10-CM

## 2020-12-15 DIAGNOSIS — E78.2 MIXED HYPERLIPIDEMIA: ICD-10-CM

## 2020-12-15 DIAGNOSIS — I63.9 ISCHEMIC CEREBROVASCULAR ACCIDENT (CVA) (HCC): ICD-10-CM

## 2020-12-15 DIAGNOSIS — N18.30 STAGE 3 CHRONIC KIDNEY DISEASE (HCC): ICD-10-CM

## 2020-12-15 RX ORDER — ATORVASTATIN CALCIUM 80 MG/1
40 TABLET, FILM COATED ORAL
Qty: 45 TABLET | Refills: 1 | Status: SHIPPED | OUTPATIENT
Start: 2020-12-15 | End: 2021-04-09 | Stop reason: SDUPTHER

## 2020-12-26 ENCOUNTER — HOSPITAL ENCOUNTER (OUTPATIENT)
Facility: HOSPITAL | Age: 60
Setting detail: OBSERVATION
Discharge: HOME | End: 2020-12-27
Attending: EMERGENCY MEDICINE | Admitting: INTERNAL MEDICINE
Payer: COMMERCIAL

## 2020-12-26 ENCOUNTER — APPOINTMENT (OUTPATIENT)
Dept: RADIOLOGY | Facility: HOSPITAL | Age: 60
End: 2020-12-26
Payer: COMMERCIAL

## 2020-12-26 DIAGNOSIS — N18.6 ESRD (END STAGE RENAL DISEASE) (HCC): Primary | ICD-10-CM

## 2020-12-26 DIAGNOSIS — E11.65 TYPE 2 DIABETES MELLITUS WITH HYPERGLYCEMIA, WITH LONG-TERM CURRENT USE OF INSULIN (HCC): ICD-10-CM

## 2020-12-26 DIAGNOSIS — Z79.4 TYPE 2 DIABETES MELLITUS WITH HYPERGLYCEMIA, WITH LONG-TERM CURRENT USE OF INSULIN (HCC): ICD-10-CM

## 2020-12-26 DIAGNOSIS — U07.1 COVID-19 VIRUS INFECTION: ICD-10-CM

## 2020-12-26 PROBLEM — Z99.2 ESRD ON DIALYSIS (HCC): Status: ACTIVE | Noted: 2020-12-26

## 2020-12-26 LAB
ABO GROUP BLD: NORMAL
ALBUMIN SERPL BCP-MCNC: 3.5 G/DL (ref 3.5–5)
ALP SERPL-CCNC: 89 U/L (ref 46–116)
ALT SERPL W P-5'-P-CCNC: 20 U/L (ref 12–78)
ANION GAP SERPL CALCULATED.3IONS-SCNC: 14 MMOL/L (ref 4–13)
ANION GAP SERPL CALCULATED.3IONS-SCNC: 16 MMOL/L (ref 4–13)
AST SERPL W P-5'-P-CCNC: 16 U/L (ref 5–45)
BASOPHILS # BLD AUTO: 0 THOUSANDS/ΜL (ref 0–0.1)
BASOPHILS NFR BLD AUTO: 0 % (ref 0–1)
BILIRUB SERPL-MCNC: 0.37 MG/DL (ref 0.2–1)
BUN SERPL-MCNC: 79 MG/DL (ref 5–25)
BUN SERPL-MCNC: 83 MG/DL (ref 5–25)
CALCIUM SERPL-MCNC: 8.5 MG/DL (ref 8.3–10.1)
CALCIUM SERPL-MCNC: 8.7 MG/DL (ref 8.3–10.1)
CHLORIDE SERPL-SCNC: 97 MMOL/L (ref 100–108)
CHLORIDE SERPL-SCNC: 99 MMOL/L (ref 100–108)
CK MB SERPL-MCNC: 1.2 % (ref 0–2.5)
CK MB SERPL-MCNC: 3 NG/ML (ref 0–5)
CK SERPL-CCNC: 250 U/L (ref 39–308)
CO2 SERPL-SCNC: 21 MMOL/L (ref 21–32)
CO2 SERPL-SCNC: 23 MMOL/L (ref 21–32)
CREAT SERPL-MCNC: 7.29 MG/DL (ref 0.6–1.3)
CREAT SERPL-MCNC: 7.43 MG/DL (ref 0.6–1.3)
CRP SERPL QL: 34.8 MG/L
D DIMER PPP FEU-MCNC: 1.21 UG/ML FEU
EOSINOPHIL # BLD AUTO: 0.02 THOUSAND/ΜL (ref 0–0.61)
EOSINOPHIL NFR BLD AUTO: 1 % (ref 0–6)
ERYTHROCYTE [DISTWIDTH] IN BLOOD BY AUTOMATED COUNT: 14.3 % (ref 11.6–15.1)
FERRITIN SERPL-MCNC: 573 NG/ML (ref 8–388)
FLUAV RNA RESP QL NAA+PROBE: NEGATIVE
FLUBV RNA RESP QL NAA+PROBE: NEGATIVE
GFR SERPL CREATININE-BSD FRML MDRD: 7 ML/MIN/1.73SQ M
GFR SERPL CREATININE-BSD FRML MDRD: 7 ML/MIN/1.73SQ M
GLUCOSE SERPL-MCNC: 109 MG/DL (ref 65–140)
GLUCOSE SERPL-MCNC: 111 MG/DL (ref 65–140)
GLUCOSE SERPL-MCNC: 143 MG/DL (ref 65–140)
GLUCOSE SERPL-MCNC: 196 MG/DL (ref 65–140)
HCT VFR BLD AUTO: 33 % (ref 36.5–49.3)
HGB BLD-MCNC: 10.8 G/DL (ref 12–17)
IMM GRANULOCYTES # BLD AUTO: 0.06 THOUSAND/UL (ref 0–0.2)
IMM GRANULOCYTES NFR BLD AUTO: 2 % (ref 0–2)
LACTATE SERPL-SCNC: 0.7 MMOL/L (ref 0.5–2)
LYMPHOCYTES # BLD AUTO: 0.87 THOUSANDS/ΜL (ref 0.6–4.47)
LYMPHOCYTES NFR BLD AUTO: 22 % (ref 14–44)
MCH RBC QN AUTO: 29.8 PG (ref 26.8–34.3)
MCHC RBC AUTO-ENTMCNC: 32.7 G/DL (ref 31.4–37.4)
MCV RBC AUTO: 91 FL (ref 82–98)
MONOCYTES # BLD AUTO: 0.46 THOUSAND/ΜL (ref 0.17–1.22)
MONOCYTES NFR BLD AUTO: 12 % (ref 4–12)
NEUTROPHILS # BLD AUTO: 2.57 THOUSANDS/ΜL (ref 1.85–7.62)
NEUTS SEG NFR BLD AUTO: 63 % (ref 43–75)
NRBC BLD AUTO-RTO: 0 /100 WBCS
NT-PROBNP SERPL-MCNC: 1579 PG/ML
PLATELET # BLD AUTO: 112 THOUSANDS/UL (ref 149–390)
PLATELET # BLD AUTO: 115 THOUSANDS/UL (ref 149–390)
PMV BLD AUTO: 10.2 FL (ref 8.9–12.7)
PMV BLD AUTO: 10.6 FL (ref 8.9–12.7)
POTASSIUM SERPL-SCNC: 4.1 MMOL/L (ref 3.5–5.3)
POTASSIUM SERPL-SCNC: 4.2 MMOL/L (ref 3.5–5.3)
PROT SERPL-MCNC: 7.5 G/DL (ref 6.4–8.2)
RBC # BLD AUTO: 3.62 MILLION/UL (ref 3.88–5.62)
RH BLD: POSITIVE
RSV RNA RESP QL NAA+PROBE: NEGATIVE
SARS-COV-2 RNA RESP QL NAA+PROBE: POSITIVE
SODIUM SERPL-SCNC: 134 MMOL/L (ref 136–145)
SODIUM SERPL-SCNC: 136 MMOL/L (ref 136–145)
TROPONIN I SERPL-MCNC: 0.04 NG/ML
WBC # BLD AUTO: 3.98 THOUSAND/UL (ref 4.31–10.16)

## 2020-12-26 PROCEDURE — 0241U HB NFCT DS VIR RESP RNA 4 TRGT: CPT | Performed by: INTERNAL MEDICINE

## 2020-12-26 PROCEDURE — 84145 PROCALCITONIN (PCT): CPT | Performed by: INTERNAL MEDICINE

## 2020-12-26 PROCEDURE — 87040 BLOOD CULTURE FOR BACTERIA: CPT | Performed by: INTERNAL MEDICINE

## 2020-12-26 PROCEDURE — 82553 CREATINE MB FRACTION: CPT | Performed by: INTERNAL MEDICINE

## 2020-12-26 PROCEDURE — 82948 REAGENT STRIP/BLOOD GLUCOSE: CPT

## 2020-12-26 PROCEDURE — 85025 COMPLETE CBC W/AUTO DIFF WBC: CPT | Performed by: INTERNAL MEDICINE

## 2020-12-26 PROCEDURE — 84484 ASSAY OF TROPONIN QUANT: CPT | Performed by: INTERNAL MEDICINE

## 2020-12-26 PROCEDURE — 36415 COLL VENOUS BLD VENIPUNCTURE: CPT | Performed by: EMERGENCY MEDICINE

## 2020-12-26 PROCEDURE — 85379 FIBRIN DEGRADATION QUANT: CPT | Performed by: INTERNAL MEDICINE

## 2020-12-26 PROCEDURE — 86901 BLOOD TYPING SEROLOGIC RH(D): CPT | Performed by: INTERNAL MEDICINE

## 2020-12-26 PROCEDURE — 93005 ELECTROCARDIOGRAM TRACING: CPT

## 2020-12-26 PROCEDURE — 99284 EMERGENCY DEPT VISIT MOD MDM: CPT

## 2020-12-26 PROCEDURE — 99285 EMERGENCY DEPT VISIT HI MDM: CPT | Performed by: EMERGENCY MEDICINE

## 2020-12-26 PROCEDURE — 83605 ASSAY OF LACTIC ACID: CPT | Performed by: INTERNAL MEDICINE

## 2020-12-26 PROCEDURE — 99220 PR INITIAL OBSERVATION CARE/DAY 70 MINUTES: CPT | Performed by: INTERNAL MEDICINE

## 2020-12-26 PROCEDURE — 83880 ASSAY OF NATRIURETIC PEPTIDE: CPT | Performed by: INTERNAL MEDICINE

## 2020-12-26 PROCEDURE — 71045 X-RAY EXAM CHEST 1 VIEW: CPT

## 2020-12-26 PROCEDURE — 80053 COMPREHEN METABOLIC PANEL: CPT | Performed by: INTERNAL MEDICINE

## 2020-12-26 PROCEDURE — 82550 ASSAY OF CK (CPK): CPT | Performed by: INTERNAL MEDICINE

## 2020-12-26 PROCEDURE — 82728 ASSAY OF FERRITIN: CPT | Performed by: INTERNAL MEDICINE

## 2020-12-26 PROCEDURE — 86140 C-REACTIVE PROTEIN: CPT | Performed by: INTERNAL MEDICINE

## 2020-12-26 PROCEDURE — 80048 BASIC METABOLIC PNL TOTAL CA: CPT | Performed by: EMERGENCY MEDICINE

## 2020-12-26 PROCEDURE — 3066F NEPHROPATHY DOC TX: CPT | Performed by: PODIATRIST

## 2020-12-26 PROCEDURE — 85049 AUTOMATED PLATELET COUNT: CPT | Performed by: INTERNAL MEDICINE

## 2020-12-26 PROCEDURE — 86900 BLOOD TYPING SEROLOGIC ABO: CPT | Performed by: INTERNAL MEDICINE

## 2020-12-26 RX ORDER — ASCORBIC ACID 500 MG
1000 TABLET ORAL EVERY 12 HOURS SCHEDULED
Status: DISCONTINUED | OUTPATIENT
Start: 2020-12-26 | End: 2020-12-27 | Stop reason: HOSPADM

## 2020-12-26 RX ORDER — CALCIUM ACETATE 667 MG/1
667 CAPSULE ORAL
Status: DISCONTINUED | OUTPATIENT
Start: 2020-12-26 | End: 2020-12-27 | Stop reason: HOSPADM

## 2020-12-26 RX ORDER — MELATONIN
2000 DAILY
Status: DISCONTINUED | OUTPATIENT
Start: 2020-12-26 | End: 2020-12-27 | Stop reason: HOSPADM

## 2020-12-26 RX ORDER — ASPIRIN 81 MG/1
81 TABLET ORAL DAILY
Status: DISCONTINUED | OUTPATIENT
Start: 2020-12-26 | End: 2020-12-27 | Stop reason: HOSPADM

## 2020-12-26 RX ORDER — ATORVASTATIN CALCIUM 40 MG/1
40 TABLET, FILM COATED ORAL
Status: DISCONTINUED | OUTPATIENT
Start: 2020-12-26 | End: 2020-12-27 | Stop reason: HOSPADM

## 2020-12-26 RX ORDER — INSULIN GLARGINE 100 [IU]/ML
10 INJECTION, SOLUTION SUBCUTANEOUS
Status: DISCONTINUED | OUTPATIENT
Start: 2020-12-27 | End: 2020-12-27 | Stop reason: HOSPADM

## 2020-12-26 RX ORDER — ZINC SULFATE 50(220)MG
220 CAPSULE ORAL DAILY
Status: DISCONTINUED | OUTPATIENT
Start: 2020-12-26 | End: 2020-12-27 | Stop reason: HOSPADM

## 2020-12-26 RX ORDER — CARVEDILOL 6.25 MG/1
6.25 TABLET ORAL 2 TIMES DAILY WITH MEALS
Status: DISCONTINUED | OUTPATIENT
Start: 2020-12-26 | End: 2020-12-27 | Stop reason: HOSPADM

## 2020-12-26 RX ORDER — MULTIVITAMIN/IRON/FOLIC ACID 18MG-0.4MG
1 TABLET ORAL DAILY
Status: DISCONTINUED | OUTPATIENT
Start: 2021-01-02 | End: 2020-12-27 | Stop reason: HOSPADM

## 2020-12-26 RX ADMIN — OXYCODONE HYDROCHLORIDE AND ACETAMINOPHEN 1000 MG: 500 TABLET ORAL at 20:45

## 2020-12-26 RX ADMIN — ENOXAPARIN SODIUM 30 MG: 30 INJECTION SUBCUTANEOUS at 15:43

## 2020-12-26 RX ADMIN — INSULIN LISPRO 2 UNITS: 100 INJECTION, SOLUTION INTRAVENOUS; SUBCUTANEOUS at 16:48

## 2020-12-26 RX ADMIN — Medication 2000 UNITS: at 15:42

## 2020-12-26 RX ADMIN — ZINC SULFATE 220 MG (50 MG) CAPSULE 220 MG: CAPSULE at 15:42

## 2020-12-26 RX ADMIN — ATORVASTATIN CALCIUM 40 MG: 40 TABLET, FILM COATED ORAL at 18:26

## 2020-12-27 ENCOUNTER — APPOINTMENT (OUTPATIENT)
Dept: DIALYSIS | Facility: HOSPITAL | Age: 60
End: 2020-12-27
Payer: COMMERCIAL

## 2020-12-27 VITALS
DIASTOLIC BLOOD PRESSURE: 80 MMHG | BODY MASS INDEX: 31.21 KG/M2 | TEMPERATURE: 99.2 F | HEART RATE: 88 BPM | SYSTOLIC BLOOD PRESSURE: 133 MMHG | HEIGHT: 70 IN | OXYGEN SATURATION: 94 % | RESPIRATION RATE: 20 BRPM | WEIGHT: 218 LBS

## 2020-12-27 LAB
ALBUMIN SERPL BCP-MCNC: 3.2 G/DL (ref 3.5–5)
ALP SERPL-CCNC: 90 U/L (ref 46–116)
ALT SERPL W P-5'-P-CCNC: 19 U/L (ref 12–78)
ANION GAP SERPL CALCULATED.3IONS-SCNC: 15 MMOL/L (ref 4–13)
AST SERPL W P-5'-P-CCNC: 15 U/L (ref 5–45)
ATRIAL RATE: 74 BPM
BASOPHILS # BLD AUTO: 0.01 THOUSANDS/ΜL (ref 0–0.1)
BASOPHILS NFR BLD AUTO: 0 % (ref 0–1)
BILIRUB SERPL-MCNC: 0.31 MG/DL (ref 0.2–1)
BUN SERPL-MCNC: 91 MG/DL (ref 5–25)
CALCIUM ALBUM COR SERPL-MCNC: 8.7 MG/DL (ref 8.3–10.1)
CALCIUM SERPL-MCNC: 8.1 MG/DL (ref 8.3–10.1)
CHLORIDE SERPL-SCNC: 101 MMOL/L (ref 100–108)
CO2 SERPL-SCNC: 21 MMOL/L (ref 21–32)
CREAT SERPL-MCNC: 7.56 MG/DL (ref 0.6–1.3)
EOSINOPHIL # BLD AUTO: 0.04 THOUSAND/ΜL (ref 0–0.61)
EOSINOPHIL NFR BLD AUTO: 1 % (ref 0–6)
ERYTHROCYTE [DISTWIDTH] IN BLOOD BY AUTOMATED COUNT: 14.5 % (ref 11.6–15.1)
GFR SERPL CREATININE-BSD FRML MDRD: 7 ML/MIN/1.73SQ M
GLUCOSE SERPL-MCNC: 100 MG/DL (ref 65–140)
GLUCOSE SERPL-MCNC: 106 MG/DL (ref 65–140)
GLUCOSE SERPL-MCNC: 109 MG/DL (ref 65–140)
HCT VFR BLD AUTO: 34 % (ref 36.5–49.3)
HGB BLD-MCNC: 11 G/DL (ref 12–17)
IMM GRANULOCYTES # BLD AUTO: 0.05 THOUSAND/UL (ref 0–0.2)
IMM GRANULOCYTES NFR BLD AUTO: 1 % (ref 0–2)
LYMPHOCYTES # BLD AUTO: 0.9 THOUSANDS/ΜL (ref 0.6–4.47)
LYMPHOCYTES NFR BLD AUTO: 26 % (ref 14–44)
MCH RBC QN AUTO: 29.6 PG (ref 26.8–34.3)
MCHC RBC AUTO-ENTMCNC: 32.4 G/DL (ref 31.4–37.4)
MCV RBC AUTO: 92 FL (ref 82–98)
MONOCYTES # BLD AUTO: 0.52 THOUSAND/ΜL (ref 0.17–1.22)
MONOCYTES NFR BLD AUTO: 15 % (ref 4–12)
NEUTROPHILS # BLD AUTO: 2.01 THOUSANDS/ΜL (ref 1.85–7.62)
NEUTS SEG NFR BLD AUTO: 57 % (ref 43–75)
NRBC BLD AUTO-RTO: 0 /100 WBCS
P AXIS: 42 DEGREES
PLATELET # BLD AUTO: 112 THOUSANDS/UL (ref 149–390)
PMV BLD AUTO: 10.2 FL (ref 8.9–12.7)
POTASSIUM SERPL-SCNC: 3.9 MMOL/L (ref 3.5–5.3)
PR INTERVAL: 168 MS
PROCALCITONIN SERPL-MCNC: 0.15 NG/ML
PROCALCITONIN SERPL-MCNC: 0.19 NG/ML
PROT SERPL-MCNC: 6.9 G/DL (ref 6.4–8.2)
QRS AXIS: -8 DEGREES
QRSD INTERVAL: 110 MS
QT INTERVAL: 400 MS
QTC INTERVAL: 444 MS
RBC # BLD AUTO: 3.71 MILLION/UL (ref 3.88–5.62)
SODIUM SERPL-SCNC: 137 MMOL/L (ref 136–145)
T WAVE AXIS: 39 DEGREES
VENTRICULAR RATE: 74 BPM
WBC # BLD AUTO: 3.53 THOUSAND/UL (ref 4.31–10.16)

## 2020-12-27 PROCEDURE — 80053 COMPREHEN METABOLIC PANEL: CPT | Performed by: INTERNAL MEDICINE

## 2020-12-27 PROCEDURE — NC001 PR NO CHARGE: Performed by: INTERNAL MEDICINE

## 2020-12-27 PROCEDURE — G0257 UNSCHED DIALYSIS ESRD PT HOS: HCPCS

## 2020-12-27 PROCEDURE — 84145 PROCALCITONIN (PCT): CPT | Performed by: INTERNAL MEDICINE

## 2020-12-27 PROCEDURE — 82948 REAGENT STRIP/BLOOD GLUCOSE: CPT

## 2020-12-27 PROCEDURE — 85025 COMPLETE CBC W/AUTO DIFF WBC: CPT | Performed by: INTERNAL MEDICINE

## 2020-12-27 PROCEDURE — 99217 PR OBSERVATION CARE DISCHARGE MANAGEMENT: CPT | Performed by: INTERNAL MEDICINE

## 2020-12-27 PROCEDURE — 93010 ELECTROCARDIOGRAM REPORT: CPT | Performed by: INTERNAL MEDICINE

## 2020-12-27 PROCEDURE — 90935 HEMODIALYSIS ONE EVALUATION: CPT | Performed by: INTERNAL MEDICINE

## 2020-12-27 RX ORDER — ZINC SULFATE 50(220)MG
220 CAPSULE ORAL DAILY
Qty: 6 CAPSULE | Refills: 0 | Status: SHIPPED | OUTPATIENT
Start: 2020-12-27 | End: 2021-05-25

## 2020-12-27 RX ORDER — INSULIN GLARGINE 100 [IU]/ML
10 INJECTION, SOLUTION SUBCUTANEOUS DAILY
Start: 2020-12-27 | End: 2021-02-25

## 2020-12-27 RX ADMIN — ENOXAPARIN SODIUM 30 MG: 30 INJECTION SUBCUTANEOUS at 08:09

## 2020-12-27 RX ADMIN — CALCIUM ACETATE 667 MG: 667 CAPSULE ORAL at 08:09

## 2020-12-27 RX ADMIN — OXYCODONE HYDROCHLORIDE AND ACETAMINOPHEN 1000 MG: 500 TABLET ORAL at 14:26

## 2020-12-27 RX ADMIN — ASPIRIN 81 MG: 81 TABLET ORAL at 14:25

## 2020-12-27 RX ADMIN — CALCIUM ACETATE 667 MG: 667 CAPSULE ORAL at 11:37

## 2020-12-27 RX ADMIN — Medication 2000 UNITS: at 14:25

## 2020-12-27 RX ADMIN — INSULIN GLARGINE 10 UNITS: 100 INJECTION, SOLUTION SUBCUTANEOUS at 08:10

## 2020-12-27 RX ADMIN — ZINC SULFATE 220 MG (50 MG) CAPSULE 220 MG: CAPSULE at 14:26

## 2020-12-27 RX ADMIN — TORSEMIDE 50 MG: 20 TABLET ORAL at 14:25

## 2020-12-28 ENCOUNTER — TRANSITIONAL CARE MANAGEMENT (OUTPATIENT)
Dept: INTERNAL MEDICINE CLINIC | Facility: CLINIC | Age: 60
End: 2020-12-28

## 2020-12-30 ENCOUNTER — HOSPITAL ENCOUNTER (INPATIENT)
Facility: HOSPITAL | Age: 60
LOS: 2 days | Discharge: HOME | DRG: 177 | End: 2021-01-02
Attending: EMERGENCY MEDICINE | Admitting: INTERNAL MEDICINE
Payer: MEDICARE

## 2020-12-30 ENCOUNTER — APPOINTMENT (EMERGENCY)
Dept: RADIOLOGY | Facility: HOSPITAL | Age: 60
DRG: 177 | End: 2020-12-30
Payer: MEDICARE

## 2020-12-30 DIAGNOSIS — R11.2 NAUSEA AND VOMITING: ICD-10-CM

## 2020-12-30 DIAGNOSIS — U07.1 COVID-19 VIRUS INFECTION: ICD-10-CM

## 2020-12-30 DIAGNOSIS — J12.82 PNEUMONIA DUE TO COVID-19 VIRUS: Primary | ICD-10-CM

## 2020-12-30 DIAGNOSIS — Z99.2 ESRD ON HEMODIALYSIS (HCC): ICD-10-CM

## 2020-12-30 DIAGNOSIS — N18.6 ESRD ON HEMODIALYSIS (HCC): ICD-10-CM

## 2020-12-30 DIAGNOSIS — U07.1 PNEUMONIA DUE TO COVID-19 VIRUS: Primary | ICD-10-CM

## 2020-12-30 LAB
ALBUMIN SERPL BCP-MCNC: 3.1 G/DL (ref 3.5–5)
ALP SERPL-CCNC: 90 U/L (ref 46–116)
ALT SERPL W P-5'-P-CCNC: 31 U/L (ref 12–78)
ANION GAP SERPL CALCULATED.3IONS-SCNC: 10 MMOL/L (ref 4–13)
AST SERPL W P-5'-P-CCNC: 26 U/L (ref 5–45)
BASOPHILS # BLD AUTO: 0 THOUSANDS/ΜL (ref 0–0.1)
BASOPHILS NFR BLD AUTO: 0 % (ref 0–1)
BILIRUB SERPL-MCNC: 0.54 MG/DL (ref 0.2–1)
BUN SERPL-MCNC: 34 MG/DL (ref 5–25)
CALCIUM ALBUM COR SERPL-MCNC: 9.2 MG/DL (ref 8.3–10.1)
CALCIUM SERPL-MCNC: 8.5 MG/DL (ref 8.3–10.1)
CHLORIDE SERPL-SCNC: 99 MMOL/L (ref 100–108)
CO2 SERPL-SCNC: 27 MMOL/L (ref 21–32)
CREAT SERPL-MCNC: 4.36 MG/DL (ref 0.6–1.3)
EOSINOPHIL # BLD AUTO: 0.01 THOUSAND/ΜL (ref 0–0.61)
EOSINOPHIL NFR BLD AUTO: 0 % (ref 0–6)
ERYTHROCYTE [DISTWIDTH] IN BLOOD BY AUTOMATED COUNT: 14.1 % (ref 11.6–15.1)
GFR SERPL CREATININE-BSD FRML MDRD: 14 ML/MIN/1.73SQ M
GLUCOSE SERPL-MCNC: 163 MG/DL (ref 65–140)
HCT VFR BLD AUTO: 31.6 % (ref 36.5–49.3)
HGB BLD-MCNC: 10.3 G/DL (ref 12–17)
HOLD SPECIMEN: NORMAL
IMM GRANULOCYTES # BLD AUTO: 0.06 THOUSAND/UL (ref 0–0.2)
IMM GRANULOCYTES NFR BLD AUTO: 2 % (ref 0–2)
LYMPHOCYTES # BLD AUTO: 0.48 THOUSANDS/ΜL (ref 0.6–4.47)
LYMPHOCYTES NFR BLD AUTO: 16 % (ref 14–44)
MCH RBC QN AUTO: 29.5 PG (ref 26.8–34.3)
MCHC RBC AUTO-ENTMCNC: 32.6 G/DL (ref 31.4–37.4)
MCV RBC AUTO: 91 FL (ref 82–98)
MONOCYTES # BLD AUTO: 0.47 THOUSAND/ΜL (ref 0.17–1.22)
MONOCYTES NFR BLD AUTO: 16 % (ref 4–12)
NEUTROPHILS # BLD AUTO: 1.98 THOUSANDS/ΜL (ref 1.85–7.62)
NEUTS SEG NFR BLD AUTO: 66 % (ref 43–75)
NRBC BLD AUTO-RTO: 0 /100 WBCS
PLATELET # BLD AUTO: 103 THOUSANDS/UL (ref 149–390)
PMV BLD AUTO: 10.1 FL (ref 8.9–12.7)
POTASSIUM SERPL-SCNC: 3.4 MMOL/L (ref 3.5–5.3)
PROT SERPL-MCNC: 7.1 G/DL (ref 6.4–8.2)
RBC # BLD AUTO: 3.49 MILLION/UL (ref 3.88–5.62)
SODIUM SERPL-SCNC: 136 MMOL/L (ref 136–145)
WBC # BLD AUTO: 3 THOUSAND/UL (ref 4.31–10.16)

## 2020-12-30 PROCEDURE — 80053 COMPREHEN METABOLIC PANEL: CPT | Performed by: EMERGENCY MEDICINE

## 2020-12-30 PROCEDURE — 99284 EMERGENCY DEPT VISIT MOD MDM: CPT

## 2020-12-30 PROCEDURE — 85610 PROTHROMBIN TIME: CPT | Performed by: EMERGENCY MEDICINE

## 2020-12-30 PROCEDURE — 82728 ASSAY OF FERRITIN: CPT | Performed by: EMERGENCY MEDICINE

## 2020-12-30 PROCEDURE — 83735 ASSAY OF MAGNESIUM: CPT | Performed by: EMERGENCY MEDICINE

## 2020-12-30 PROCEDURE — 82550 ASSAY OF CK (CPK): CPT | Performed by: EMERGENCY MEDICINE

## 2020-12-30 PROCEDURE — 85025 COMPLETE CBC W/AUTO DIFF WBC: CPT | Performed by: EMERGENCY MEDICINE

## 2020-12-30 PROCEDURE — 84478 ASSAY OF TRIGLYCERIDES: CPT | Performed by: EMERGENCY MEDICINE

## 2020-12-30 PROCEDURE — 86140 C-REACTIVE PROTEIN: CPT | Performed by: EMERGENCY MEDICINE

## 2020-12-30 PROCEDURE — 83880 ASSAY OF NATRIURETIC PEPTIDE: CPT | Performed by: EMERGENCY MEDICINE

## 2020-12-30 PROCEDURE — 99285 EMERGENCY DEPT VISIT HI MDM: CPT | Performed by: EMERGENCY MEDICINE

## 2020-12-30 PROCEDURE — 93005 ELECTROCARDIOGRAM TRACING: CPT

## 2020-12-30 PROCEDURE — 36415 COLL VENOUS BLD VENIPUNCTURE: CPT

## 2020-12-30 PROCEDURE — 82553 CREATINE MB FRACTION: CPT | Performed by: EMERGENCY MEDICINE

## 2020-12-30 PROCEDURE — 71045 X-RAY EXAM CHEST 1 VIEW: CPT

## 2020-12-30 RX ORDER — ONDANSETRON 2 MG/ML
4 INJECTION INTRAMUSCULAR; INTRAVENOUS ONCE
Status: DISCONTINUED | OUTPATIENT
Start: 2020-12-30 | End: 2021-01-02 | Stop reason: HOSPADM

## 2020-12-31 PROBLEM — D61.818 PANCYTOPENIA (HCC): Status: ACTIVE | Noted: 2020-12-31

## 2020-12-31 LAB
ALBUMIN SERPL BCP-MCNC: 2.9 G/DL (ref 3.5–5)
ALP SERPL-CCNC: 73 U/L (ref 46–116)
ALT SERPL W P-5'-P-CCNC: 30 U/L (ref 12–78)
ANION GAP SERPL CALCULATED.3IONS-SCNC: 12 MMOL/L (ref 4–13)
AST SERPL W P-5'-P-CCNC: 27 U/L (ref 5–45)
ATRIAL RATE: 76 BPM
ATRIAL RATE: 89 BPM
BILIRUB SERPL-MCNC: 0.48 MG/DL (ref 0.2–1)
BUN SERPL-MCNC: 42 MG/DL (ref 5–25)
CA-I BLD-SCNC: 1.02 MMOL/L (ref 1.12–1.32)
CALCIUM ALBUM COR SERPL-MCNC: 9 MG/DL (ref 8.3–10.1)
CALCIUM SERPL-MCNC: 8.1 MG/DL (ref 8.3–10.1)
CHLORIDE SERPL-SCNC: 102 MMOL/L (ref 100–108)
CK MB SERPL-MCNC: 2 NG/ML (ref 0–5)
CK MB SERPL-MCNC: <1 % (ref 0–2.5)
CK SERPL-CCNC: 385 U/L (ref 39–308)
CO2 SERPL-SCNC: 27 MMOL/L (ref 21–32)
CREAT SERPL-MCNC: 5.13 MG/DL (ref 0.6–1.3)
CRP SERPL QL: 62.7 MG/L
CRP SERPL QL: 78.6 MG/L
D DIMER PPP FEU-MCNC: 1.64 UG/ML FEU
ERYTHROCYTE [DISTWIDTH] IN BLOOD BY AUTOMATED COUNT: 14.3 % (ref 11.6–15.1)
FERRITIN SERPL-MCNC: 1020 NG/ML (ref 8–388)
FERRITIN SERPL-MCNC: 952 NG/ML (ref 8–388)
GFR SERPL CREATININE-BSD FRML MDRD: 11 ML/MIN/1.73SQ M
GLUCOSE P FAST SERPL-MCNC: 102 MG/DL (ref 65–99)
GLUCOSE SERPL-MCNC: 102 MG/DL (ref 65–140)
GLUCOSE SERPL-MCNC: 140 MG/DL (ref 65–140)
GLUCOSE SERPL-MCNC: 85 MG/DL (ref 65–140)
GLUCOSE SERPL-MCNC: 92 MG/DL (ref 65–140)
GLUCOSE SERPL-MCNC: 94 MG/DL (ref 65–140)
HCT VFR BLD AUTO: 30.2 % (ref 36.5–49.3)
HGB BLD-MCNC: 9.6 G/DL (ref 12–17)
INR PPP: 1.11 (ref 0.84–1.19)
LACTATE SERPL-SCNC: 0.7 MMOL/L (ref 0.5–2)
MAGNESIUM SERPL-MCNC: 1.9 MG/DL (ref 1.6–2.6)
MCH RBC QN AUTO: 28.9 PG (ref 26.8–34.3)
MCHC RBC AUTO-ENTMCNC: 31.8 G/DL (ref 31.4–37.4)
MCV RBC AUTO: 91 FL (ref 82–98)
NT-PROBNP SERPL-MCNC: 1259 PG/ML
P AXIS: 54 DEGREES
P AXIS: 63 DEGREES
PLATELET # BLD AUTO: 103 THOUSANDS/UL (ref 149–390)
PMV BLD AUTO: 10.4 FL (ref 8.9–12.7)
POTASSIUM SERPL-SCNC: 3.7 MMOL/L (ref 3.5–5.3)
PR INTERVAL: 150 MS
PR INTERVAL: 160 MS
PROCALCITONIN SERPL-MCNC: 0.2 NG/ML
PROT SERPL-MCNC: 6.8 G/DL (ref 6.4–8.2)
PROTHROMBIN TIME: 14.4 SECONDS (ref 11.6–14.5)
QRS AXIS: 24 DEGREES
QRS AXIS: 68 DEGREES
QRSD INTERVAL: 92 MS
QRSD INTERVAL: 98 MS
QT INTERVAL: 360 MS
QT INTERVAL: 378 MS
QTC INTERVAL: 425 MS
QTC INTERVAL: 438 MS
RBC # BLD AUTO: 3.32 MILLION/UL (ref 3.88–5.62)
SODIUM SERPL-SCNC: 141 MMOL/L (ref 136–145)
T WAVE AXIS: 1 DEGREES
T WAVE AXIS: 40 DEGREES
TRIGL SERPL-MCNC: 210 MG/DL
TROPONIN I SERPL-MCNC: 0.1 NG/ML
TROPONIN I SERPL-MCNC: 0.13 NG/ML
TROPONIN I SERPL-MCNC: 0.14 NG/ML
VENTRICULAR RATE: 76 BPM
VENTRICULAR RATE: 89 BPM
WBC # BLD AUTO: 3.09 THOUSAND/UL (ref 4.31–10.16)

## 2020-12-31 PROCEDURE — 83605 ASSAY OF LACTIC ACID: CPT | Performed by: EMERGENCY MEDICINE

## 2020-12-31 PROCEDURE — 36415 COLL VENOUS BLD VENIPUNCTURE: CPT | Performed by: EMERGENCY MEDICINE

## 2020-12-31 PROCEDURE — 99245 OFF/OP CONSLTJ NEW/EST HI 55: CPT | Performed by: INTERNAL MEDICINE

## 2020-12-31 PROCEDURE — XW033E5 INTRODUCTION OF REMDESIVIR ANTI-INFECTIVE INTO PERIPHERAL VEIN, PERCUTANEOUS APPROACH, NEW TECHNOLOGY GROUP 5: ICD-10-PCS | Performed by: INTERNAL MEDICINE

## 2020-12-31 PROCEDURE — 84484 ASSAY OF TROPONIN QUANT: CPT | Performed by: INTERNAL MEDICINE

## 2020-12-31 PROCEDURE — 93010 ELECTROCARDIOGRAM REPORT: CPT | Performed by: INTERNAL MEDICINE

## 2020-12-31 PROCEDURE — 82948 REAGENT STRIP/BLOOD GLUCOSE: CPT

## 2020-12-31 PROCEDURE — 84484 ASSAY OF TROPONIN QUANT: CPT

## 2020-12-31 PROCEDURE — 80053 COMPREHEN METABOLIC PANEL: CPT | Performed by: INTERNAL MEDICINE

## 2020-12-31 PROCEDURE — 83520 IMMUNOASSAY QUANT NOS NONAB: CPT | Performed by: EMERGENCY MEDICINE

## 2020-12-31 PROCEDURE — 86140 C-REACTIVE PROTEIN: CPT | Performed by: INTERNAL MEDICINE

## 2020-12-31 PROCEDURE — 87040 BLOOD CULTURE FOR BACTERIA: CPT | Performed by: EMERGENCY MEDICINE

## 2020-12-31 PROCEDURE — 82955 ASSAY OF G6PD ENZYME: CPT | Performed by: EMERGENCY MEDICINE

## 2020-12-31 PROCEDURE — 99223 1ST HOSP IP/OBS HIGH 75: CPT | Performed by: INTERNAL MEDICINE

## 2020-12-31 PROCEDURE — 82728 ASSAY OF FERRITIN: CPT | Performed by: INTERNAL MEDICINE

## 2020-12-31 PROCEDURE — 85379 FIBRIN DEGRADATION QUANT: CPT | Performed by: INTERNAL MEDICINE

## 2020-12-31 PROCEDURE — 85027 COMPLETE CBC AUTOMATED: CPT | Performed by: INTERNAL MEDICINE

## 2020-12-31 PROCEDURE — 84484 ASSAY OF TROPONIN QUANT: CPT | Performed by: EMERGENCY MEDICINE

## 2020-12-31 PROCEDURE — 84145 PROCALCITONIN (PCT): CPT | Performed by: EMERGENCY MEDICINE

## 2020-12-31 PROCEDURE — 82330 ASSAY OF CALCIUM: CPT | Performed by: EMERGENCY MEDICINE

## 2020-12-31 PROCEDURE — 93005 ELECTROCARDIOGRAM TRACING: CPT

## 2020-12-31 RX ORDER — HEPARIN SODIUM 5000 [USP'U]/ML
5000 INJECTION, SOLUTION INTRAVENOUS; SUBCUTANEOUS EVERY 8 HOURS SCHEDULED
Status: DISCONTINUED | OUTPATIENT
Start: 2020-12-31 | End: 2021-01-02 | Stop reason: HOSPADM

## 2020-12-31 RX ORDER — ACETAMINOPHEN 325 MG/1
650 TABLET ORAL EVERY 6 HOURS PRN
Status: DISCONTINUED | OUTPATIENT
Start: 2020-12-31 | End: 2021-01-02 | Stop reason: HOSPADM

## 2020-12-31 RX ORDER — DEXAMETHASONE SODIUM PHOSPHATE 4 MG/ML
6 INJECTION, SOLUTION INTRA-ARTICULAR; INTRALESIONAL; INTRAMUSCULAR; INTRAVENOUS; SOFT TISSUE EVERY 24 HOURS
Status: DISCONTINUED | OUTPATIENT
Start: 2020-12-31 | End: 2021-01-02 | Stop reason: HOSPADM

## 2020-12-31 RX ORDER — ATORVASTATIN CALCIUM 40 MG/1
40 TABLET, FILM COATED ORAL
Status: DISCONTINUED | OUTPATIENT
Start: 2020-12-31 | End: 2021-01-02 | Stop reason: HOSPADM

## 2020-12-31 RX ORDER — MELATONIN
1000 DAILY
Status: DISCONTINUED | OUTPATIENT
Start: 2020-12-31 | End: 2021-01-02 | Stop reason: HOSPADM

## 2020-12-31 RX ORDER — ASCORBIC ACID 500 MG
1000 TABLET ORAL EVERY 12 HOURS SCHEDULED
Status: DISCONTINUED | OUTPATIENT
Start: 2020-12-31 | End: 2021-01-02 | Stop reason: HOSPADM

## 2020-12-31 RX ORDER — INSULIN GLARGINE 100 [IU]/ML
10 INJECTION, SOLUTION SUBCUTANEOUS EVERY MORNING
Status: DISCONTINUED | OUTPATIENT
Start: 2020-12-31 | End: 2021-01-02 | Stop reason: HOSPADM

## 2020-12-31 RX ORDER — HEPARIN SODIUM 5000 [USP'U]/ML
5000 INJECTION, SOLUTION INTRAVENOUS; SUBCUTANEOUS EVERY 8 HOURS SCHEDULED
Status: DISCONTINUED | OUTPATIENT
Start: 2020-12-31 | End: 2020-12-31

## 2020-12-31 RX ORDER — ONDANSETRON 2 MG/ML
4 INJECTION INTRAMUSCULAR; INTRAVENOUS EVERY 6 HOURS PRN
Status: DISCONTINUED | OUTPATIENT
Start: 2020-12-31 | End: 2021-01-02 | Stop reason: HOSPADM

## 2020-12-31 RX ORDER — ASPIRIN 81 MG/1
81 TABLET ORAL DAILY
Status: DISCONTINUED | OUTPATIENT
Start: 2020-12-31 | End: 2021-01-02 | Stop reason: HOSPADM

## 2020-12-31 RX ORDER — ZINC SULFATE 50(220)MG
220 CAPSULE ORAL DAILY
Status: DISCONTINUED | OUTPATIENT
Start: 2020-12-31 | End: 2021-01-02 | Stop reason: HOSPADM

## 2020-12-31 RX ORDER — CARVEDILOL 6.25 MG/1
6.25 TABLET ORAL 2 TIMES DAILY WITH MEALS
Status: DISCONTINUED | OUTPATIENT
Start: 2020-12-31 | End: 2021-01-02 | Stop reason: HOSPADM

## 2020-12-31 RX ADMIN — ATORVASTATIN CALCIUM 40 MG: 40 TABLET, FILM COATED ORAL at 18:12

## 2020-12-31 RX ADMIN — HEPARIN SODIUM 5000 UNITS: 5000 INJECTION INTRAVENOUS; SUBCUTANEOUS at 18:13

## 2020-12-31 RX ADMIN — CEFEPIME HYDROCHLORIDE 1000 MG: 1 INJECTION, POWDER, FOR SOLUTION INTRAMUSCULAR; INTRAVENOUS at 01:42

## 2020-12-31 RX ADMIN — OXYCODONE HYDROCHLORIDE AND ACETAMINOPHEN 1000 MG: 500 TABLET ORAL at 21:57

## 2020-12-31 RX ADMIN — HEPARIN SODIUM 5000 UNITS: 5000 INJECTION INTRAVENOUS; SUBCUTANEOUS at 06:38

## 2020-12-31 RX ADMIN — CARVEDILOL 6.25 MG: 6.25 TABLET, FILM COATED ORAL at 07:36

## 2020-12-31 RX ADMIN — DEXAMETHASONE SODIUM PHOSPHATE 6 MG: 4 INJECTION, SOLUTION INTRA-ARTICULAR; INTRALESIONAL; INTRAMUSCULAR; INTRAVENOUS; SOFT TISSUE at 23:58

## 2020-12-31 RX ADMIN — INSULIN GLARGINE 10 UNITS: 100 INJECTION, SOLUTION SUBCUTANEOUS at 12:52

## 2020-12-31 RX ADMIN — OXYCODONE HYDROCHLORIDE AND ACETAMINOPHEN 1000 MG: 500 TABLET ORAL at 12:02

## 2020-12-31 RX ADMIN — ASPIRIN 81 MG: 81 TABLET ORAL at 12:03

## 2020-12-31 RX ADMIN — TORSEMIDE 50 MG: 20 TABLET ORAL at 08:36

## 2020-12-31 RX ADMIN — Medication 1000 UNITS: at 12:02

## 2020-12-31 RX ADMIN — CARVEDILOL 6.25 MG: 6.25 TABLET, FILM COATED ORAL at 18:13

## 2020-12-31 RX ADMIN — ZINC SULFATE 220 MG (50 MG) CAPSULE 220 MG: CAPSULE at 12:11

## 2020-12-31 RX ADMIN — REMDESIVIR 200 MG: 100 INJECTION, POWDER, LYOPHILIZED, FOR SOLUTION INTRAVENOUS at 12:48

## 2020-12-31 RX ADMIN — VANCOMYCIN HYDROCHLORIDE 1250 MG: 1 INJECTION, POWDER, LYOPHILIZED, FOR SOLUTION INTRAVENOUS at 04:46

## 2020-12-31 NOTE — ASSESSMENT & PLAN NOTE
· Patient with end-stage renal disease on hemodialysis, recently discharged from the hospital (12/27) secondary to COVID pneumonia  · Today, after hemodialysis patient was complaining of generalized myalgia, some abdominal pain and "not feeling well"  He mentioned he had 2 loose bowel movement today  He still has productive cough and congestion  · Patient denied shortness of breath, fever, chills, headaches, anosmia, ageusia, nausea, vomiting  · Chest x-ray showed worsening ground-glass opacity aspect (unofficial reading), but denies shortness of breath and has oxygen saturations of 95% at room air  · Emergency department patient received ceftriaxone and vancomycin  I do not see any reason for antibiotics for now will check procalcitonin  · Patient will be admitted as observation    · Workup:      CRP: 62  ·   COVID stage:  Mild      Plan:  · Labs:  CBC, CMP, CRP, ferritin, D-dimer  · Meds:  Vitamin-C 1000 mg q 12                    Zinc 220 mg daily                    Vitamin D3 1000 units daily  · Non pharmacological intervention:  Ambulation, out of bed, self probing if able, incentive spirometry

## 2020-12-31 NOTE — ASSESSMENT & PLAN NOTE
· Recently diagnosed with covid 19 ( 12/23/20) was placed in the mild protocol, no steroids or remdesivir were given in his prior hospitalization, admitted due to  generalized weakness, malaise, 2 loose  bowel movements on admission , (-) anosmia, (-) ageusia  · On admission : Spo2 95% Chest x-ray showed worsening ground-glass opacity greater on the right side compatible with Covid 19 , Emergency department patient received ceftriaxone and vancomycin, Through the day noted to desaturate to 88%  needed O2 NC 2L to maintain sPo2 > 90%,Inflamatory markers noted to be elevated,  As well as leukopenia , considering thi Steroids and Remdesivir by protocol initiated, procalc neg    · Due to worsening inflammatory markers, Convalescent plasma was considered , patient was explained on detailed about it however he declined    Plan:        Contact and airborne isolations  · Covid 19 protocol, continue corticosteroids and remdesivir   · Procalc neg , would not add Ab at this time  · Self proning  · Therapeutic systemic anticoagulation  · Incentive spirometry  · Monitor spO2

## 2020-12-31 NOTE — CONSULTS
[facility] 61 y o  male MRN: [id number]  Unit/Bed#: ED 24 Encounter: [id number]    ASSESSMENT and PLAN:  1  ESRD on HD (patient dialyzes every Monday, Wednesday, Friday at [facility] but due to active COVID-19 infection patient is at [facility] facility for isolation patients-[place]):   ·  kg  · Fairly new start October 2020  · From review of outpatient records it appears that patient is a fairly large weight jose cruz between treatments  He refused his total dose of torsemide and only took 10 mg per ER nurse discussion this morning  Prescribed dose of torsemide 50 mg daily  Unclear why he is being noncompliant with treatment plan  · From our perspective his next hemodialysis treatment will be on Saturday due to holiday schedule  2  Access: AVF- no issues per outpatient documentation  3  Hypertension:    · Blood pressure above goal  · Continue current medications  4  Anemia:   · On micera per outpatient clinic every 2 weeks  · Also on Venofer 100 mg weekly  5  Mineral and bone disease:    · Continue calcitriol 0 5 mcg 3 times a week following dialysis  · Continue phosphorus binder, renal diet  6  COVID-19 pneumonia:  Chest x-ray shows worsening bilateral ground-glass opacities greater on the right compatible with COVID-19 pneumonia  Management per primary team   Requiring nasal cannula oxygen  7  History of urinary retention:  Monitor    SUMMARY OF RECOMMENDATIONS:   Next HD treatment Saturday 1/2 due to holiday schedule   Continue current medications  Encourage patient to take full dose of torsemide    HISTORY OF PRESENT ILLNESS:  Requesting Physician: Richard Hancock MD  Reason for Consult: ESRD on HD    Akash Woodall is a 61 y o  male with a history of ESRD, diabetes mellitus type 2, hypertension, CVA who was admitted to [facility] after presenting with worsening symptoms related to COVID-19 infection    Patient was recently discharged after being hospitalized with COVID-19 pneumonia  Patient was seen through viewing door in the emergency room  He is requiring nasal cannula oxygen  He has a frequent cough  He is being noncompliant with treatment plan, unclear why but he is refusing medications  He has no lower extremity edema  Last hemodialysis treatment on 12/30/2020  A renal consultation is requested today for assistance in the management of ESRD  Efe Haile is a known ESRD patient who undergoes maintenance hemodialysis at [facility] on Monday, Wednesday, Friday  PAST MEDICAL HISTORY:  Past Medical History:   Diagnosis Date    Chronic kidney disease     Diabetes mellitus [facility] [address] )     GERD (gastroesophageal reflux disease)     Hypercholesteremia     Hyperlipidemia     Hypertension     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    Neuropathy     Obesity     Osteomyelitis ([facility] [address] )     last assessed 11/4/16    PVC's (premature ventricular contractions)     sees cardiology Dr Hamilton camargo    [facility])     last weeof July 2018 [address] [place] [address]       PAST SURGICAL HISTORY:  Past Surgical History:   Procedure Laterality Date    ABDOMINAL SURGERY      CHOLECYSTECTOMY      Percutaneous    COLONOSCOPY      CYSTOSCOPY      OTHER SURGICAL HISTORY      "stimulator to control bowel movements"    VT ESOPHAGOGASTRODUODENOSCOPY TRANSORAL DIAGNOSTIC N/A 9/27/2016    Procedure: ESOPHAGOGASTRODUODENOSCOPY (EGD); Surgeon: Rich Oliver MD;  [facility];   Service: Gastroenterology    VT LAP,CHOLECYSTECTOMY N/A 2/29/2016    Procedure: LAPAROSCOPIC CHOLECYSTECTOMY ;  Surgeon: eMl Rincon DO;  [facility];  Service: General    ROTATOR CUFF REPAIR Right     TOE AMPUTATION Right 10/28/2016    Procedure: 3RD TOE AMPUTATION ;  Surgeon: Driss Santos DPM;  [facility];  Service:        ALLERGIES:  No Known Allergies    SOCIAL HISTORY:  Social History     Substance and Sexual Activity   Alcohol Use Not Currently    Frequency: Never    Drinks per session: Patient refused    Binge frequency: Never     Social History     Substance and Sexual Activity   Drug Use No     Social History     Tobacco Use   Smoking Status Never Smoker   Smokeless Tobacco Never Used       FAMILY HISTORY:  Family History   Problem Relation Age of Onset    Leukemia Mother     Liver disease Mother     Lung cancer Mother         heavy smoker - 3 ppd    Heart disease Father     Liver disease Father     Multiple myeloma Sister     Breast cancer Sister     Urolithiasis Family     Alcohol abuse Neg Hx     Depression Neg Hx     Drug abuse Neg Hx     Substance Abuse Neg Hx     Mental illness Neg Hx        MEDICATIONS:    Current Facility-Administered Medications:     acetaminophen (TYLENOL) tablet 650 mg, 650 mg, Oral, Q6H PRN, Maritza Rebolledo MD    ascorbic acid (VITAMIN C) tablet 1,000 mg, 1,000 mg, Oral, Q12H [facility], Maritza Rebolledo MD    aspirin (ECOTRIN LOW STRENGTH) EC tablet 81 mg, 81 mg, Oral, Daily, Maritza Rebolledo MD    atorvastatin (LIPITOR) tablet 40 mg, 40 mg, Oral, Daily With Morris Lema MD    carvedilol (COREG) tablet 6 25 mg, 6 25 mg, Oral, BID With Meals, Maritza Rebolledo MD, 6 25 mg at 12/31/20 0736    cholecalciferol (VITAMIN D3) tablet 1,000 Units, 1,000 Units, Oral, Daily, Maritza Rebolledo MD    heparin (porcine) subcutaneous injection 5,000 Units, 5,000 Units, Subcutaneous, Q8H [facility], 5,000 Units at 12/31/20 7076 **AND** [CANCELED] Platelet count, , , Once, Maritza Rebolledo MD    insulin glargine (LANTUS) subcutaneous injection 10 Units 0 1 mL, 10 Units, Subcutaneous, QAM, Maritza Rebolledo MD    insulin lispro (HumaLOG) 100 units/mL subcutaneous injection 1-5 Units, 1-5 Units, Subcutaneous, QAM, Maritza Rebolledo MD    insulin lispro (HumaLOG) 100 units/mL subcutaneous injection 1-6 Units, 1-6 Units, Subcutaneous, TID AC **AND** Fingerstick Glucose (POCT), , , TID AC, Radha Finch MD    ondansetron [facility]) injection 4 mg, 4 mg, Intravenous, Once, Radha Finch MD, Stopped at 12/30/20 2355    ondansetron [facility]) injection 4 mg, 4 mg, Intravenous, Q6H PRN, Iban Morrison MD    torsemide [facility]) tablet 50 mg, 50 mg, Oral, Daily, Radha Finch MD, 50 mg at 12/31/20 5605    zinc sulfate (ZINCATE) capsule 220 mg, 220 mg, Oral, Daily, Radha Finch MD    Current Outpatient Medications:     ascorbic acid (VITAMIN C) 1000 MG tablet, Take 1 tablet (1,000 mg total) by mouth every 12 (twelve) hours for 13 doses, Disp: 13 tablet, Rfl: 0    aspirin (ECOTRIN LOW STRENGTH) 81 mg EC tablet, Take 81 mg by mouth daily Resume on 8/14, Disp: , Rfl:     atorvastatin (LIPITOR) 80 mg tablet, TAKE 0 5 TABLETS (40 MG TOTAL) BY MOUTH DAILY WITH DINNER, Disp: 45 tablet, Rfl: 1    carvedilol (COREG) 6 25 mg tablet, Take 1 tablet (6 25 mg total) by mouth 2 (two) times a day with meals, Disp: 180 tablet, Rfl: 0    Cholecalciferol (Vitamin D3) 1 25 MG (89430 UT) CAPS, TAKE 1 CAPSULE BY MOUTH ONE TIME PER WEEK, Disp: 12 capsule, Rfl: 2    insulin glargine (Basaglar KwikPen) 100 units/mL injection pen, Inject 10 Units under the skin daily, Disp: , Rfl:     zinc sulfate (ZINCATE) 220 mg capsule, Take 1 capsule (220 mg total) by mouth daily for 6 doses, Disp: 6 capsule, Rfl: 0    Blood Glucose Monitoring Suppl (TRUE METRIX METER) w/Device KIT, Use to test blood sugars 3 times daily, Disp: 1 kit, Rfl: 0    Blood Pressure Monitoring (BLOOD PRESSURE CUFF) MISC, Use to check blood pressure before taking blood pressure medication and 1 hour after and follow instructions provided in discharge instructions based on the readings  , Disp: 1 each, Rfl: 0    calcium acetate (PHOSLO) capsule, Take 1 capsule (667 mg total) by mouth 3 (three) times a day with meals, Disp: 90 capsule, Rfl: 0    Continuous Blood Gluc  (DEXCOM G6 ) LANCE, Use as directed for continuous glucose monitoring, Disp: 1 Device, Rfl: 0    Continuous Blood Gluc Sensor (DEXCOM G6 SENSOR) MISC, Use as directed for continuous glucose monitoring  Change every 10 days, Disp: 1 each, Rfl: 11    Continuous Blood Gluc Transmit (DEXCOM G6 TRANSMITTER) MISC, Use as directed for continuous glucose monitoring-Change every 3 months, Disp: 1 each, Rfl: 3    Incontinence Supplies (MALE URINAL) MISC, by Does not apply route daily, Disp: 6 each, Rfl: 3    Insulin Pen Needle 32G X 4 MM MISC, USE TO INJECT INSULIN 4 TIMES DAILY, Disp: 120 each, Rfl: 1    Insulin Syringe-Needle U-100 (B-D INS SYRINGE 0 5CC/30GX1/2") 30G X 1/2" 0 5 ML MISC, Inject under the skin 4 (four) times a day, Disp: 360 each, Rfl: 1    ONETOUCH DELICA LANCETS 51O MISC, by Does not apply route 3 (three) times a day, Disp: 270 each, Rfl: 1    torsemide (DEMADEX) 10 mg tablet, Take 5 tablets (50 mg total) by mouth daily, Disp: 150 tablet, Rfl: 0    REVIEW OF SYSTEMS:  Assisted by primary nurse in the emergency room  Patient seen through viewing window to reduce exposure to COVID-19 virus  Constitutional:  Complains of fatigue  Eyes: Negative for visual disturbance  Respiratory:  Positive for cough, shortness of breath   Cardiovascular: Negative for chest pain, palpitations and leg swelling  Gastrointestinal: Negative for abdominal pain  Genitourinary: No dysuria, hematuria  Musculoskeletal:  Positive for arthralgias  Skin: Negative for rash  Neurological: Negative for focal weakness  Hematological: Negative for easy bruising or bleeding  All the systems were reviewed and were negative except as documented on the HPI      PHYSICAL EXAM:  Current Weight: Weight - Scale: 99 8 kg (220 lb)  First Weight: Weight - Scale: 99 8 kg (220 lb)  Vitals:    12/30/20 2102 12/31/20 0639 12/31/20 0726 12/31/20 0736   BP: 154/72 158/80 (!) 176/77 152/99   BP Location: Right arm Right arm Right arm Right arm   Pulse: 94 82 85 87 Resp: 16 18 18 20   Temp: 99 °F (37 2 °C)  100 2 °F (37 9 °C)    TempSrc: Oral  Oral    SpO2: 95% 95% 93% 95%   Weight: 99 8 kg (220 lb)      Height: 5' 10" (1 778 m)        No intake or output data in the 24 hours ending 12/31/20 0901  Physical Exam  Constitutional:       General: He is not in acute distress  Appearance: He is obese  He is ill-appearing  HENT:      Head: Normocephalic  Eyes:      General: No scleral icterus  Cardiovascular:      Rate and Rhythm: Regular rhythm  Pulmonary:      Effort: No respiratory distress  Abdominal:      General: There is no distension  Musculoskeletal:      Right lower leg: No edema  Left lower leg: No edema  Skin:     Coloration: Skin is not pale  Findings: No erythema or rash  Neurological:      General: No focal deficit present  Mental Status: He is alert     Psychiatric:      Comments: Per nurses in the emergency room argumentative and noncompliant with treatment plan         Invasive Devices:      Lab Results:   Results from last 7 days   Lab Units 12/31/20  0636 12/30/20 2109 12/27/20  0451   WBC Thousand/uL 3 09* 3 00* 3 53*   HEMOGLOBIN g/dL 9 6* 10 3* 11 0*   HEMATOCRIT % 30 2* 31 6* 34 0*   PLATELETS Thousands/uL 103* 103* 112*   POTASSIUM mmol/L 3 7 3 4* 3 9   CHLORIDE mmol/L 102 99* 101   CO2 mmol/L 27 27 21   BUN mg/dL 42* 34* 91*   CREATININE mg/dL 5 13* 4 36* 7 56*   CALCIUM mg/dL 8 1* 8 5 8 1*   MAGNESIUM mg/dL  --  1 9  --    ALK PHOS U/L 73 90 90   ALT U/L 30 31 19   AST U/L 27 26 15     Lab Results   Component Value Date    CALCIUM 8 1 (L) 12/31/2020    PHOS 5 9 (H) 10/06/2020     Other Studies:

## 2020-12-31 NOTE — ED PROVIDER NOTES
History  Chief Complaint   Patient presents with    Vomiting     Patient is covid + and has diarrhea today  Notes to have dialysis today as well  Patient is a 61year old male who was last seen in this ED on 12/26/20 for covid 19 and ESRD on hemodialysis  States he had dialysis today  Has had diarrhea  (+) N/V today as well  No fever  (+) cough  Is on nasal cannula oxygen in ED  Is not on home oxygen  No sob  No travel  [facility] [place] [facility] website checked on this patient and last Rx filled was on 8/22/19 for lomotil for 13 day supply  No abdominal pain  No chest pain  History provided by:  Patient   used: No    Vomiting  Associated symptoms: cough and diarrhea    Associated symptoms: no abdominal pain and no fever        Prior to Admission Medications   Prescriptions Last Dose Informant Patient Reported? Taking? Blood Glucose Monitoring Suppl (TRUE METRIX METER) w/Device KIT  Spouse/Significant Other No No   Sig: Use to test blood sugars 3 times daily   Blood Pressure Monitoring (BLOOD PRESSURE CUFF) MISC  Spouse/Significant Other No No   Sig: Use to check blood pressure before taking blood pressure medication and 1 hour after and follow instructions provided in discharge instructions based on the readings  Cholecalciferol (Vitamin D3) 1 25 MG (24285 UT) CAPS  Spouse/Significant Other No No   Sig: TAKE 1 CAPSULE BY MOUTH ONE TIME PER WEEK   Continuous Blood Gluc  (DEXCOM G6 ) LANCE  Spouse/Significant Other No No   Sig: Use as directed for continuous glucose monitoring   Continuous Blood Gluc Sensor (DEXCOM G6 SENSOR) MISC  Spouse/Significant Other No No   Sig: Use as directed for continuous glucose monitoring   Change every 10 days   Continuous Blood Gluc Transmit (DEXCOM G6 TRANSMITTER) MISC  Spouse/Significant Other No No   Sig: Use as directed for continuous glucose monitoring-Change every 3 months   Incontinence Supplies (MALE URINAL) MISC  Spouse/Significant Other No No Sig: by Does not apply route daily   Insulin Pen Needle 32G X 4 MM MISC  Spouse/Significant Other No No   Sig: USE TO INJECT INSULIN 4 TIMES DAILY   Insulin Syringe-Needle U-100 (B-D INS SYRINGE 0 5CC/30GX1/2") 30G X 1/2" 0 5 ML MISC  Spouse/Significant Other No No   Sig: Inject under the skin 4 (four) times a day   ONETOUCH DELICA LANCETS 65M MISC  Spouse/Significant Other No No   Sig: by Does not apply route 3 (three) times a day   ascorbic acid (VITAMIN C) 1000 MG tablet   No No   Sig: Take 1 tablet (1,000 mg total) by mouth every 12 (twelve) hours for 13 doses   aspirin (ECOTRIN LOW STRENGTH) 81 mg EC tablet  Spouse/Significant Other Yes No   Sig: Take 81 mg by mouth daily Resume on 8/14   atorvastatin (LIPITOR) 80 mg tablet   No No   Sig: TAKE 0 5 TABLETS (40 MG TOTAL) BY MOUTH DAILY WITH DINNER   calcium acetate (PHOSLO) capsule   No No   Sig: Take 1 capsule (667 mg total) by mouth 3 (three) times a day with meals   carvedilol (COREG) 6 25 mg tablet   No No   Sig: Take 1 tablet (6 25 mg total) by mouth 2 (two) times a day with meals   insulin glargine (Basaglar KwikPen) 100 units/mL injection pen   No No   Sig: Inject 10 Units under the skin daily   torsemide (DEMADEX) 10 mg tablet   No No   Sig: Take 5 tablets (50 mg total) by mouth daily   zinc sulfate (ZINCATE) 220 mg capsule   No No   Sig: Take 1 capsule (220 mg total) by mouth daily for 6 doses      Facility-Administered Medications: None       Past Medical History:   Diagnosis Date    Chronic kidney disease     Diabetes mellitus ([facility] [address] )     GERD (gastroesophageal reflux disease)     Hypercholesteremia     Hyperlipidemia     Hypertension     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    Neuropathy     Obesity     Osteomyelitis ([facility] [address] )     last assessed 11/4/16    PVC's (premature ventricular contractions)     sees cardiology Dr Anupam camargo    Stroke [facility])     last weeof July 2018 [facility]       Past Surgical History: Procedure Laterality Date    ABDOMINAL SURGERY      CHOLECYSTECTOMY      Percutaneous    COLONOSCOPY      CYSTOSCOPY      OTHER SURGICAL HISTORY      "stimulator to control bowel movements"    [place] ESOPHAGOGASTRODUODENOSCOPY TRANSORAL DIAGNOSTIC N/A 9/27/2016    Procedure: ESOPHAGOGASTRODUODENOSCOPY (EGD); Surgeon: Daja García MD;  [facility]: [facility]; Service: Gastroenterology    [place] LAP,CHOLECYSTECTOMY N/A 2/29/2016    Procedure: LAPAROSCOPIC CHOLECYSTECTOMY ;  Surgeon: Gita Patterson DO;  Location[facility];  Service: General    ROTATOR CUFF REPAIR Right     TOE AMPUTATION Right 10/28/2016    Procedure: 3RD TOE AMPUTATION ;  Surgeon: Nicolle Judge DPM;  Location[facility];  Service:        Family History   Problem Relation Age of Onset    Leukemia Mother     Liver disease Mother     Lung cancer Mother         heavy smoker - 3 ppd    Heart disease Father     Liver disease Father     Multiple myeloma Sister     Breast cancer Sister     Urolithiasis Family     Alcohol abuse Neg Hx     Depression Neg Hx     Drug abuse Neg Hx     Substance Abuse Neg Hx     Mental illness Neg Hx      I have reviewed and agree with the history as documented  E-Cigarette/Vaping    E-Cigarette Use Never User      E-Cigarette/Vaping Substances    Nicotine No     THC No     CBD No     Flavoring No     Other No     Unknown No      Social History     Tobacco Use    Smoking status: Never Smoker    Smokeless tobacco: Never Used   Substance Use Topics    Alcohol use: Not Currently     Frequency: Never     Drinks per session: Patient refused     Binge frequency: Never    Drug use: No       Review of Systems   Constitutional: Negative for fever  Respiratory: Positive for cough  Negative for shortness of breath  Cardiovascular: Negative for chest pain  Gastrointestinal: Positive for diarrhea, nausea and vomiting  Negative for abdominal pain     All other systems reviewed and are negative  Physical Exam  Physical Exam  Vitals signs and nursing note reviewed  Constitutional:       General: He is in acute distress (moderate)  HENT:      Head: Normocephalic and atraumatic  Mouth/Throat:      Comments: Mucous membranes somewhat moist    Eyes:      General: No scleral icterus  Extraocular Movements: Extraocular movements intact  Pupils: Pupils are equal, round, and reactive to light  Neck:      Musculoskeletal: Normal range of motion and neck supple  No neck rigidity  Cardiovascular:      Rate and Rhythm: Normal rate and regular rhythm  Heart sounds: Normal heart sounds  No murmur  Pulmonary:      Effort: Pulmonary effort is normal  No respiratory distress  Breath sounds: Normal breath sounds  No stridor  No wheezing, rhonchi or rales  Abdominal:      General: Bowel sounds are normal  There is no distension  Palpations: Abdomen is soft  Tenderness: There is no abdominal tenderness  Musculoskeletal:         General: No deformity  Right lower leg: No edema  Left lower leg: No edema  Skin:     General: Skin is warm and dry  Findings: No erythema or rash  Neurological:      General: No focal deficit present  Mental Status: He is alert and oriented to person, place, and time     Psychiatric:         Mood and Affect: Mood normal          Vital Signs  ED Triage Vitals [12/30/20 2102]   Temperature Pulse Respirations Blood Pressure SpO2   99 °F (37 2 °C) 94 16 154/72 95 %      Temp Source Heart Rate Source Patient Position - Orthostatic VS BP Location FiO2 (%)   Oral Monitor Lying Right arm --      Pain Score       7           Vitals:    12/30/20 2102   BP: 154/72   Pulse: 94   Patient Position - Orthostatic VS: Lying         Visual Acuity      ED Medications  Medications   ondansetron (ZOFRAN) injection 4 mg (0 mg Intravenous Hold 12/30/20 1917)   cefepime (MAXIPIME) 1,000 mg in dextrose 5 % 50 mL IVPB (has no administration in time range)   vancomycin (VANCOCIN) 1,250 mg in sodium chloride 0 9 % 250 mL IVPB (has no administration in time range)       Diagnostic Studies  Results Reviewed     Procedure Component Value Units Date/Time    Lactic acid, plasma [[id number]]  (Normal) Collected: 12/31/20 0043    Lab Status: Final result Specimen: Blood from Hand, Right Updated: 12/31/20 0117     LACTIC ACID 0 7 mmol/L     Narrative:      Result may be elevated if tourniquet was used during collection  Interleukin-6,Serum [[id number]] Updated: 12/31/20 0111    Lab Status: No result Specimen: Blood from Hand, Right     Magnesium [[id number]]  (Normal) Collected: 12/30/20 2109    Lab Status: Final result Specimen: Blood from Arm, Left Updated: 12/31/20 0111     Magnesium 1 9 mg/dL     C-reactive protein [[id number]]  (Abnormal) Collected: 12/30/20 2109    Lab Status: Final result Specimen: Blood from Arm, Left Updated: 12/31/20 0111     CRP 62 7 mg/L     NT-BNP PRO [[id number]]  (Abnormal) Collected: 12/30/20 2109    Lab Status: Final result Specimen: Blood from Arm, Left Updated: 12/31/20 0111     NT-proBNP 1,259 pg/mL     Triglycerides [[id number]]  (Abnormal) Collected: 12/30/20 2109    Lab Status: Final result Specimen: Blood from Arm, Left Updated: 12/31/20 0111     Triglycerides 210 mg/dL     CKMB [[id number]]  (Normal) Collected: 12/30/20 2109    Lab Status: Final result Specimen: Blood from Arm, Left Updated: 12/31/20 0111     CK-MB Index <1 0 %      CK-MB 2 0 ng/mL     CK (with reflex to MB) [[id number]]  (Abnormal) Collected: 12/30/20 2109    Lab Status: Final result Specimen: Blood from Arm, Left Updated: 12/31/20 0109     Total  U/L     Calcium, ionized [[id number]]  (Abnormal) Collected: 12/31/20 0043    Lab Status: Final result Specimen: Blood from Hand, Right Updated: 12/31/20 0059     Calcium, Ionized 1 02 mmol/L     Procalcitonin with AM Reflex [[id number]] Collected: 12/31/20 0043    Lab Status:  In process Specimen: Blood from Hand, Right Updated: 12/31/20 0054    Glucose 6 phosphate dehydrogenase [[id number]] Collected: 12/31/20 0043    Lab Status: In process Specimen: Blood from Hand, Right Updated: 12/31/20 0054    Blood culture #2 [[id number]] Collected: 12/31/20 0043    Lab Status: In process Specimen: Blood from Hand, Right Updated: 12/31/20 0053    Ferritin [[id number]] Collected: 12/30/20 2109    Lab Status:  In process Specimen: Blood from Arm, Left Updated: 12/31/20 0032    Protime-INR [[id number]]  (Normal) Collected: 12/30/20 2109    Lab Status: Final result Specimen: Blood from Arm, Left Updated: 12/31/20 0022     Protime 14 4 seconds      INR 1 11    Blood culture #1 [[id number]]     Lab Status: No result Specimen: Blood     Troponin I [[id number]]     Lab Status: No result Specimen: Blood     Comprehensive metabolic panel [[id number]]  (Abnormal) Collected: 12/30/20 2109    Lab Status: Final result Specimen: Blood from Arm, Left Updated: 12/30/20 2134     Sodium 136 mmol/L      Potassium 3 4 mmol/L      Chloride 99 mmol/L      CO2 27 mmol/L      ANION GAP 10 mmol/L      BUN 34 mg/dL      Creatinine 4 36 mg/dL      Glucose 163 mg/dL      Calcium 8 5 mg/dL      Corrected Calcium 9 2 mg/dL      AST 26 U/L      ALT 31 U/L      Alkaline Phosphatase 90 U/L      Total Protein 7 1 g/dL      Albumin 3 1 g/dL      Total Bilirubin 0 54 mg/dL      eGFR 14 ml/min/1 73sq m     Narrative:      [place] guidelines for Chronic Kidney Disease (CKD):     Stage 1 with normal or high GFR (GFR > 90 mL/min/1 73 square meters)    Stage 2 Mild CKD (GFR = 60-89 mL/min/1 73 square meters)    Stage 3A Moderate CKD (GFR = 45-59 mL/min/1 73 square meters)    Stage 3B Moderate CKD (GFR = 30-44 mL/min/1 73 square meters)    Stage 4 Severe CKD (GFR = 15-29 mL/min/1 73 square meters)    Stage 5 End Stage CKD (GFR <15 mL/min/1 73 square meters)  Note: GFR calculation is accurate only with a steady state creatinine    CBC and differential [[id number]]  (Abnormal) Collected: 12/30/20 2109    Lab Status: Final result Specimen: Blood from Arm, Left Updated: 12/30/20 2121     WBC 3 00 Thousand/uL      RBC 3 49 Million/uL      Hemoglobin 10 3 g/dL      Hematocrit 31 6 %      MCV 91 fL      MCH 29 5 pg      MCHC 32 6 g/dL      RDW 14 1 %      MPV 10 1 fL      Platelets 211 Thousands/uL      nRBC 0 /100 WBCs      Neutrophils Relative 66 %      Immat GRANS % 2 %      Lymphocytes Relative 16 %      Monocytes Relative 16 %      Eosinophils Relative 0 %      Basophils Relative 0 %      Neutrophils Absolute 1 98 Thousands/µL      Immature Grans Absolute 0 06 Thousand/uL      Lymphocytes Absolute 0 48 Thousands/µL      Monocytes Absolute 0 47 Thousand/µL      Eosinophils Absolute 0 01 Thousand/µL      Basophils Absolute 0 00 Thousands/µL                  XR chest 1 view portable   ED Interpretation by Thomas Cheng MD (12/30 8521)   Bilateral infiltrates c/w covid 19 pneumonia read by me  Procedures  ECG 12 Lead Documentation Only    Date/Time: 12/30/2020 11:45 PM  Performed by: Thomas Cheng MD  Authorized by: Thomas Cheng MD     Indications / Diagnosis:  Vomiting  ECG reviewed by me, the ED Provider: yes    Patient location:  ED  Previous ECG:     Previous ECG:  Compared to current    Comparison ECG info:  12/26/20    Similarity:  No change  Quality:     Tracing quality:  Limited by artifact  Rate:     ECG rate:  89    ECG rate assessment: normal    Rhythm:     Rhythm: sinus rhythm    Ectopy:     Ectopy: none    QRS:     QRS axis:  Normal    QRS intervals:  Normal  Conduction:     Conduction: normal    ST segments:     ST segments:  Normal  T waves:     T waves: normal               ED Course  ED Course as of Dec 31 0120   Thu Dec 31, 2020   0038 CXR and labs d/w patient                                                 MDM  Number of Diagnoses or Management Options  Diagnosis management comments: DDx including but not limited to: food poisoning, viral illness, metabolic abnormality, dehydration, doubt intracranial process, GI etiology, gastroenteritis, COVID 19, pneumonia, adverse reaction, ESRD; doubt acute surgical intraabdominal process, Boorhave's syndrome, mediastinitis  Amount and/or Complexity of Data Reviewed  Clinical lab tests: reviewed and ordered  Tests in the radiology section of CPT®: ordered and reviewed  Decide to obtain previous medical records or to obtain history from someone other than the patient: yes  Review and summarize past medical records: yes  Discuss the patient with other providers: yes  Independent visualization of images, tracings, or specimens: yes        Disposition  Final diagnoses:   Pneumonia due to COVID-19 virus   Nausea and vomiting   ESRD on hemodialysis ([facility] [address] )     Time reflects when diagnosis was documented in both MDM as applicable and the Disposition within this note     Time User Action Codes Description Comment    12/31/2020 12:38 AM Braden Mcdowell Add [U07 1,  J12 89] Pneumonia due to COVID-19 virus     12/31/2020 12:38 AM Braden Mcdowell Add [R11 2] Nausea and vomiting     12/31/2020 12:39 AM Braden Mcdowell Add [N18 6,  Z99 2] ESRD on hemodialysis [facility])       ED Disposition     ED Disposition Condition Date/Time Comment    Admit Stable Thu Dec 31, 2020  1:02 AM Case was discussed with [facility] resident and the patient's admission status was agreed to be Admission Status: observation status to the service of Dr Jeannie Blanca   Follow-up Information    None         Patient's Medications   Discharge Prescriptions    No medications on file     No discharge procedures on file      PDMP Review       Value Time User    PDMP Reviewed  Yes 12/30/2020 10:40 PM Austin He MD          ED Provider  Electronically Signed by           Austin He MD  12/31/20 9414       Austin He MD  12/31/20 0155

## 2020-12-31 NOTE — ASSESSMENT & PLAN NOTE
Lab Results   Component Value Date    EGFR 14 12/30/2020    EGFR 7 12/27/2020    EGFR 7 12/26/2020    EGFR 7 12/26/2020    CREATININE 4 36 (H) 12/30/2020    CREATININE 7 56 (H) 12/27/2020    CREATININE 7 43 (H) 12/26/2020    CREATININE 7 29 (H) 12/26/2020   · Patient with ESRD stage 5 on hemodialysis, Monday Wednesday Friday, with left AVF    · Patient had hemodialysis today  · Nephrology consult, if the patient will stay in the hospital until his next hemodialysis due date

## 2020-12-31 NOTE — H&P
H&P- Nelson Montez 1960, 61 y o  male MRN: [id number]    Unit/Bed#: ED 24 Encounter: [id number]    Primary Care Provider: Yonas Barajas DO   Date and time admitted to hospital: 12/30/2020  9:22 PM        * COVID-19 virus infection  Assessment & Plan  · Patient with end-stage renal disease on hemodialysis, recently discharged from the hospital (12/27) secondary to COVID pneumonia  · Today, after hemodialysis patient was complaining of generalized myalgia, some abdominal pain and "not feeling well"  He mentioned he had 2 loose bowel movement today  He still has productive cough and congestion  · Patient denied shortness of breath, fever, chills, headaches, anosmia, ageusia, nausea, vomiting  · Chest x-ray showed worsening ground-glass opacity aspect (unofficial reading), but denies shortness of breath and has oxygen saturations of 95% at room air  · Emergency department patient received ceftriaxone and vancomycin  I do not see any reason for antibiotics for now will check procalcitonin  · Patient will be admitted as observation    · Workup:      CRP: 62  ·   COVID stage:  Mild      Plan:  · Labs:  CBC, CMP, CRP, ferritin, D-dimer  · Meds:  Vitamin-C 1000 mg q 12                    Zinc 220 mg daily                    Vitamin D3 1000 units daily  · Non pharmacological intervention:  Ambulation, out of bed, self probing if able, incentive spirometry  ESRD on dialysis [facility])  Assessment & Plan  Lab Results   Component Value Date    EGFR 14 12/30/2020    EGFR 7 12/27/2020    EGFR 7 12/26/2020    EGFR 7 12/26/2020    CREATININE 4 36 (H) 12/30/2020    CREATININE 7 56 (H) 12/27/2020    CREATININE 7 43 (H) 12/26/2020    CREATININE 7 29 (H) 12/26/2020   · Patient with ESRD stage 5 on hemodialysis, Monday Wednesday Friday, with left AVF    · Patient had hemodialysis today  · Nephrology consult, if the patient will stay in the hospital until his next hemodialysis due date    Pancytopenia [facility])  Assessment & Plan  ·  Chronic anemia likely from end-stage chronic disease  Currently at baseline  · Leukopenia- likely in the context viral infection  · Chronic Thrombocytopenia  · Monitor    Hypertension  Assessment & Plan  · On carvedilol, torsemide as home medication  · Blood pressure 154/72 mmHg  Continue home medication and monitor blood pressure    History of stroke  Assessment & Plan  · Thrombosis of left MCA in 2018  · No residual symptoms, except cognitive impairment (per notes)  · Poor historian    Type 2 diabetes mellitus ([facility] [address] )  Assessment & Plan  Lab Results   Component Value Date    HGBA1C 5 8 (H) 06/08/2020       No results for input(s): POCGLU in the last 72 hours  Blood Sugar Average: Last 72 hrs:  · patient on insulin 10 units glargine daily  · Give 10 units Lantus in the morning and insulin with sliding scale with meals  · Hypoglycemia protocol  · Diet ARASH/CH controlled, level 2    VTE Prophylaxis: Heparin  / sequential compression device   Code Status: level 1  POLST: POLST form is not discussed and not completed at this time  Anticipated Length of Stay:  Patient will be admitted on an Observation basis with an anticipated length of stay of   2 midnights  Justification for Hospital Stay:  COVID infection in a patient with comorbidities    Chief Complaint:   Myalgia, abdominal pain, diarrhea, cough,    History of Present Illness:    Alba Sanchez is a 61 y o  male with past medical history of ESRD with dialysis MWF, type 2 diabetes, CVA, hypertension, recently discharged from the hospital after COVID infection, came to emergency department today after hemodialysis, complaining of generalized myalgia, some abdominal pain, diarrhea(2 loose bowel movement), cough, not feeling well  Patient is poor historian  Per emergency department physician, patient was complaining as well today of nausea and vomiting but when I asked patient denied    Patient denied as well fever, shortness of breath, chest pain, headache, ageusia, anosmia  He mentioned many times that he is not feeling well, but couldn't describe what exactly he felt  Patient had stable vital signs as well oxygen saturations of 95% at room air POA  Initially was placed on nasal cannula oxygen in emergency department, without requiring it, discontinued later  During prior admission due to [place], patient was treated with vitamin-C, zinc, vitamin D3  Without corticosteroids or remdesivir  His oxygen saturations remained good at room air and patient was discharged one day after admission  Patient will be admitted as observation  Review of Systems:    Review of Systems   Constitutional: Negative for chills, diaphoresis, fatigue and fever  HENT: Positive for congestion  Negative for rhinorrhea and sore throat  Respiratory: Positive for cough  Negative for chest tightness and shortness of breath  Cardiovascular: Negative for chest pain, palpitations and leg swelling  Gastrointestinal: Positive for abdominal pain and diarrhea  Negative for blood in stool, constipation, nausea and vomiting  Genitourinary: Negative for flank pain and hematuria  Musculoskeletal: Positive for myalgias  Neurological: Negative for dizziness, light-headedness and headaches  All other systems reviewed and are negative        Past Medical and Surgical History:     Past Medical History:   Diagnosis Date    Chronic kidney disease     Diabetes mellitus ([facility] [address] )     GERD (gastroesophageal reflux disease)     Hypercholesteremia     Hyperlipidemia     Hypertension     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    Neuropathy     Obesity     Osteomyelitis ([facility] [address] )     last assessed 11/4/16    PVC's (premature ventricular contractions)     sees cardiology Dr Sonia camargo    Stroke [facility])     last weeof July 2018 [employer]       Past Surgical History:   Procedure Laterality Date    ABDOMINAL SURGERY      CHOLECYSTECTOMY      Percutaneous    COLONOSCOPY      CYSTOSCOPY      OTHER SURGICAL HISTORY      "stimulator to control bowel movements"    TN ESOPHAGOGASTRODUODENOSCOPY TRANSORAL DIAGNOSTIC N/A 9/27/2016    Procedure: ESOPHAGOGASTRODUODENOSCOPY (EGD); Surgeon: Guillermina Velázquez MD;  Location: [facility] GI LAB; Service: Gastroenterology    TN LAP,CHOLECYSTECTOMY N/A 2/29/2016    Procedure: LAPAROSCOPIC CHOLECYSTECTOMY ;  Surgeon: Issac Wilson DO;  Location: [facility] Main OR;  Service: General    ROTATOR CUFF REPAIR Right     TOE AMPUTATION Right 10/28/2016    Procedure: 3RD TOE AMPUTATION ;  Surgeon: Santos Fonseca DPM;  Location: [facility];  Service:        Meds/Allergies:    Prior to Admission medications    Medication Sig Start Date End Date Taking?  Authorizing Provider   ascorbic acid (VITAMIN C) 1000 MG tablet Take 1 tablet (1,000 mg total) by mouth every 12 (twelve) hours for 13 doses 12/27/20 1/3/21 Yes Lige Rinne, MD   aspirin (ECOTRIN LOW STRENGTH) 81 mg EC tablet Take 81 mg by mouth daily Resume on 8/14   Yes Historical Provider, MD   atorvastatin (LIPITOR) 80 mg tablet TAKE 0 5 TABLETS (40 MG TOTAL) BY MOUTH DAILY WITH DINNER 12/15/20  Yes Sandeep Roberto DO   carvedilol (COREG) 6 25 mg tablet Take 1 tablet (6 25 mg total) by mouth 2 (two) times a day with meals 10/26/20  Yes Sandeep Roberto DO   Cholecalciferol (Vitamin D3) 1 25 MG (24138 UT) CAPS TAKE 1 CAPSULE BY MOUTH ONE TIME PER WEEK 8/14/20  Yes Marck Ocasio MD   insulin glargine (Basaglar KwikPen) 100 units/mL injection pen Inject 10 Units under the skin daily 12/27/20  Yes Lige Rinne, MD   zinc sulfate (ZINCATE) 220 mg capsule Take 1 capsule (220 mg total) by mouth daily for 6 doses 12/27/20 1/2/21 Yes Lige Rinne, MD   Blood Glucose Monitoring Suppl (TRUE METRIX METER) w/Device KIT Use to test blood sugars 3 times daily 1/15/20   Lissette Brennan PA-C   Blood Pressure Monitoring (BLOOD PRESSURE CUFF) MISC Use to check blood pressure before taking blood pressure medication and 1 hour after and follow instructions provided in discharge instructions based on the readings  8/13/18   Tyler Jara MD   calcium acetate (PHOSLO) capsule Take 1 capsule (667 mg total) by mouth 3 (three) times a day with meals 10/9/20 11/8/20  Parker Tsang MD   Continuous Blood Gluc  (DEXCOM G6 ) LNACE Use as directed for continuous glucose monitoring 10/28/19   Lissette Brennan PA-C   Continuous Blood Gluc Sensor (DEXCOM G6 SENSOR) MISC Use as directed for continuous glucose monitoring  Change every 10 days 10/28/19   Lissette Brennan PA-C   Continuous Blood Gluc Transmit (DEXCOM G6 TRANSMITTER) MISC Use as directed for continuous glucose monitoring-Change every 3 months 10/28/19   Lissette Brennan PA-C   Incontinence Supplies (MALE URINAL) MISC by Does not apply route daily 9/24/18   Raj Auguste DO   Insulin Pen Needle 32G X 4 MM MISC USE TO INJECT INSULIN 4 TIMES DAILY 5/19/20   Lissette Brennan PA-C   Insulin Syringe-Needle U-100 (B-D INS SYRINGE 0 5CC/30GX1/2") 30G X 1/2" 0 5 ML MISC Inject under the skin 4 (four) times a day 6/5/20   Quita Nageotte, MD   [facility] MISC by Does not apply route 3 (three) times a day 11/27/18   Raj Auguste DO   torsemide (DEMADEX) 10 mg tablet Take 5 tablets (50 mg total) by mouth daily 10/10/20 11/9/20  Parker Tsang MD     I have reviewed home medications with patient personally      Allergies: No Known Allergies    Social History:     Marital Status: /Civil Union   Occupation:   Patient Pre-hospital Living Situation:  Lives with wife  Patient Pre-hospital Level of Mobility:  Independent  Patient Pre-hospital Diet Restrictions:  No diet restriction  Substance Use History:   Social History     Substance and Sexual Activity   Alcohol Use Not Currently    Frequency: Never    Drinks per session: Patient refused    Binge frequency: Never     Social History     Tobacco Use   Smoking Status Never Smoker   Smokeless Tobacco Never Used     Social History     Substance and Sexual Activity   Drug Use No       Family History:    Family History   Problem Relation Age of Onset    Leukemia Mother     Liver disease Mother     Lung cancer Mother         heavy smoker - 3 ppd    Heart disease Father     Liver disease Father     Multiple myeloma Sister     Breast cancer Sister     Urolithiasis Family     Alcohol abuse Neg Hx     Depression Neg Hx     Drug abuse Neg Hx     Substance Abuse Neg Hx     Mental illness Neg Hx        Physical Exam:     Vitals:   Blood Pressure: 154/72 (12/30/20 2102)  Pulse: 94 (12/30/20 2102)  Temperature: 99 °F (37 2 °C) (12/30/20 2102)  Temp Source: Oral (12/30/20 2102)  Respirations: 16 (12/30/20 2102)  Height: 5' 10" (177 8 cm) (12/30/20 2102)  Weight - Scale: 99 8 kg (220 lb) (12/30/20 2102)  SpO2: 95 % (12/30/20 2102)    Physical Exam  Vitals signs reviewed  Constitutional:       General: He is not in acute distress  Appearance: He is obese  He is not toxic-appearing or diaphoretic  HENT:      Head: Normocephalic  Eyes:      General: No scleral icterus  Right eye: No discharge  Left eye: No discharge  Extraocular Movements: Extraocular movements intact  Pupils: Pupils are equal, round, and reactive to light  Neck:      Musculoskeletal: Normal range of motion  Cardiovascular:      Rate and Rhythm: Normal rate and regular rhythm  Heart sounds: Normal heart sounds  No murmur  Pulmonary:      Effort: Pulmonary effort is normal  No respiratory distress  Breath sounds: Normal breath sounds  No wheezing or rales  Abdominal:      General: There is distension  Palpations: Abdomen is soft  Tenderness: There is no abdominal tenderness  There is no guarding or rebound  Musculoskeletal:      Right lower leg: No edema  Left lower leg: No edema  Skin:     General: Skin is warm     Neurological:      Mental Status: He is alert and oriented to person, place, and time  Psychiatric:         Mood and Affect: Mood is anxious  Behavior: Behavior normal          Additional Data:     Lab Results: I have personally reviewed pertinent reports  Results from last 7 days   Lab Units 12/30/20  2109   WBC Thousand/uL 3 00*   HEMOGLOBIN g/dL 10 3*   HEMATOCRIT % 31 6*   PLATELETS Thousands/uL 103*   NEUTROS PCT % 66   LYMPHS PCT % 16   MONOS PCT % 16*   EOS PCT % 0     Results from last 7 days   Lab Units 12/30/20  2109   POTASSIUM mmol/L 3 4*   CHLORIDE mmol/L 99*   CO2 mmol/L 27   BUN mg/dL 34*   CREATININE mg/dL 4 36*   CALCIUM mg/dL 8 5   ALK PHOS U/L 90   ALT U/L 31   AST U/L 26     Results from last 7 days   Lab Units 12/30/20  2109   INR  1 11       Imaging: I have personally reviewed pertinent reports  Xr Chest Portable    Result Date: 12/28/2020  Narrative: CHEST INDICATION:   covid 19 infection  Patient has confirmed COVID-19  Positive on 12/26/2020  COMPARISON:  Chest radiograph from 10/5/2020  Neck CT from 11/30/2020 which includes the upper chest  EXAM PERFORMED/VIEWS:  XR CHEST PORTABLE FINDINGS: Cardiomediastinal silhouette appears unremarkable  Subtle peripheral groundglass opacity in the right upper lung  No effusion or pneumothorax  Osseous structures appear within normal limits for patient age  Impression: Subtle peripheral groundglass opacity in the right upper lung compatible with Covid 19 pneumonia  Workstation performed: [id number]       EKG, Pathology, and Other Studies Reviewed on Admission:   ·     [facility] / Care Everywhere Records Reviewed: No     ** Please Note: This note has been constructed using a voice recognition system   **

## 2020-12-31 NOTE — ASSESSMENT & PLAN NOTE
·  Chronic anemia likely from end-stage chronic disease    Currently at baseline  · Leukopenia- likely in the context viral infection  · Chronic Thrombocytopenia  · Monitor

## 2020-12-31 NOTE — ED NOTES
Attempted to give pt medications and pt had 50mg of toresemide in mouth and spit out 40 mg and swallowed the 10mg  Pt exlpained "I don't want this shit" and I explained how this will help him get better and it is going to take some time to work  Pt continued saying that he doesn't want any medications or his breakfast because "it looks bad"  Will continue to monitor pt       Ovidio Lewis RN  12/31/20 [id number]

## 2020-12-31 NOTE — ED NOTES
Evaluated by nephrology will have dialysis on Saturday  Son called twice  in the past hour to ask his fathers condition and if there are any changes, questioned if someone can sit at the bedside with his father  Eplained the visitation policy due to covid was not happy with the answer  Pts wife dropped off phone  asked if she could visit was declined due to policy   taken to pts room with phone plugged in  Pt will not keep his mask or oxygen on in spite of being told often to do so  Is having a dry non productive cough        Aliya Connolly RN  12/31/20 [id number]

## 2020-12-31 NOTE — ASSESSMENT & PLAN NOTE
· Thrombosis of left MCA in 2018  · No residual symptoms, except cognitive impairment (per notes)  · Poor historian

## 2020-12-31 NOTE — QUICK NOTE
Senior Resident Supervision Note - Admission  [facility] Internal Medicine Residency    Patient Information:     Name: Anita Owens    MRN: [id number]  Age / Sex: 61 y o  male  Unit/Bed #: @DBLINK (RICHI,69080)  @ Encounter: [id number]    Senior Resident Statement:    Patient seen and examined personally  History, assessment, and plan detailed below as well as all orders were reviewed with PGY1 resident, Dr Jase Fabian  I agree with the assessment and plan with the following additions / corrections: NA  See Resident H&P for details  Assessment/Plan: Principal Problem:    COVID-19 virus infection  Active Problems:    Type 2 diabetes mellitus ([facility] [address] )    History of stroke    Hypertension    [facility]    [facility]enia ([facility] )     Patient was admitted for generalized myalgia, nausea vomiting after dialysis with 2 loose bowel movement, symptomatology likely related to recent COVID infection  Will continue mild pathway for COVID, since patient does not require oxygen, will not do steroids and remdesivir  Patient was started on antibiotics in the ER, will hold off antibiotics for now will do procalcitonin, I believe this is likely related to COVID pneumonia and not to bacterial pneumonia  Will consult Nephrology since patient is end-stage renal disease with hemodialysis  Of note patient did get dialysis yesterday    Will continue contact and airborne isolation      Disposition:  OBSERVATION    Expected LOS: <2 Rafaela Vera [de-identified], MD

## 2020-12-31 NOTE — UTILIZATION REVIEW
Initial Clinical Review    OBSERVATION 12/31/20 @0102 CONVERTED TO INPATIENT ADMISSION 12/31/20 @1609 DUE TO CONTINUED STAY REQUIRED TO EVALUATE AND TREAT PATIENT WITH COVID-19 WHO DESATS ON RA WITH OXYGEN, IV REMDESIVIR AND IV STEROIDS ALONG WITH BLOOD SUGAR MONITORING WHILE ON STEROIDS    Admission: Date/Time/Statement:   Admission Orders (From admission, onward)     Ordered        12/31/20 1609  Inpatient Admission  Once         12/31/20 0102  Place in Observation  Once                   Orders Placed This Encounter   Procedures    Inpatient Admission     Standing Status:   Standing     Number of Occurrences:   1     Order Specific Question:   Admitting Physician     Answer:   Mague Valle [[id number]]     Order Specific Question:   Level of Care     Answer:   Med Surg [16]     Order Specific Question:   Estimated length of stay     Answer:   More than 2 Midnights     Order Specific Question:   Certification     Answer:   I certify that inpatient services are medically necessary for this patient for a duration of greater than two midnights  See H&P and MD Progress Notes for additional information about the patient's course of treatment  ED Arrival Information     Expected Arrival Acuity Means of Arrival Escorted By Service Admission Type    - 12/30/2020 20:55 Urgent Wheelchair Family Member Hospitalist Urgent    Arrival Complaint    covid + vomiting,        Chief Complaint   Patient presents with    Vomiting     Patient is covid + and has diarrhea today  Notes to have dialysis today as well  Assessment/Plan: 60 yo male with hx of ESRD on HD MWF,type 2 diabetes, CVA, hypertension,   + COVID PNA 12/26/20 hospitalization tx w/ Vit C and D/zinc presents to ED after dialysis today with generalized myalgia,abdom pain , loose bowels,cough  On exam pt anxious, abdomen distended, normal lung sounds  Pt given IV abx in ED   CXR shows worsening ground glass opacities bilat , greater on R , compatible with COVID pneumonia    Labs show leukopenia likely 2/2 viral infection with pancytopenia  Pt not requiring O2 presently with good RA sats  Pt admitted as OBS with COVID 19 virus -mild  Plan is for procal check and hold on further abx for now, inflammarory markers, vitamin cocktail,self proning , spirometry, ambulation   12/31  AM note-Pt continues to sat well on RA, will not start remdesivir or steroids  Will place nephrology consult for HD due 1/1   -nephrology-Pt appears to have signif wt gain between treatments, pt refused dose of Torsemide this am and only took 10 mg per ns discussion, prescribed 50 mg, unclear reason for pt noncompliance with tx plan  Pt next due for HD 1/2/21 due to holiday schedule  Encourage full dose of diuretic and continue current meds    Afternoon note-Pt now on O2 due to desat to 80's on RA ,Lungs clear bilat  Pts inflammatory markers are elevated from previous admission, will start IV REmdesivir and steroids per mild protocol, continue O2 to keep sats>92  Plan to monitor Blood sugars while on steroids and adjust insulin as needed     ED Triage Vitals [12/30/20 2102]   Temperature Pulse Respirations Blood Pressure SpO2   99 °F (37 2 °C) 94 16 154/72 95 %      Temp Source Heart Rate Source Patient Position - Orthostatic VS BP Location FiO2 (%)   Oral Monitor Lying Right arm --      Pain Score       7          Wt Readings from Last 1 Encounters:   12/30/20 99 8 kg (220 lb)     Additional Vital Signs:   Date/Time  Temp  Pulse  Resp  BP  MAP (mmHg)  SpO2  Calculated FIO2 (%) - Nasal Cannula  Nasal Cannula O2 Flow Rate (L/min)  O2 Device    12/31/20 1452    80  16  139/57    98 %  28  2 L/min  Nasal cannula    12/31/20 1300    76    183/73Abnormal   115  95 %          12/31/20 1218  98 6 °F (37 °C)                    12/31/20 1200    76  20  168/73  105  94 %  28  2 L/min  Nasal cannula    12/31/20 1100    76  20  144/63  91  95 %  28  2 L/min  Nasal cannula    12/31/20 1000   78  20  162/68  98  92 %  28  2 L/min  Nasal cannula        Date/Time  Temp  Pulse  Resp  BP  SpO2    12/31/20 0736    87  20  152/99  95 %    12/31/20 0726  100 2 °F (37 9 °C)  85  18  176/77Abnormal   93 %    12/31/20 0639    82  18  158/80  95 %        Pertinent Labs/Diagnostic Test Results:   12/30 CXR  Worsening bilateral groundglass opacity, greater on the right, compatible with Covid-19 pneumonia    12/30 ECG  ECG rate:  89    ECG rate assessment: normal    Rhythm:     Rhythm: sinus rhythm    Ectopy:     Ectopy: none    QRS:     QRS axis:  Normal    QRS intervals:  Normal  Conduction:     Conduction: normal    ST segments:     ST segments:  Normal  T waves:     T waves: normal    Results from last 7 days   Lab Units 12/26/20  1535   SARS-COV-2  Positive*     Results from last 7 days   Lab Units 12/31/20  0636 12/30/20  2109 12/27/20  0451 12/26/20  1526   WBC Thousand/uL 3 09* 3 00* 3 53* 3 98*   HEMOGLOBIN g/dL 9 6* 10 3* 11 0* 10 8*   HEMATOCRIT % 30 2* 31 6* 34 0* 33 0*   PLATELETS Thousands/uL 103* 103* 112* 115*  112*   NEUTROS ABS Thousands/µL  --  1 98 2 01 2 57         Results from last 7 days   Lab Units 12/31/20  0636 12/31/20  0043 12/30/20  2109 12/27/20  0451 12/26/20  1304   SODIUM mmol/L 141  --  136 137 134*  136   POTASSIUM mmol/L 3 7  --  3 4* 3 9 4 1  4 2   CHLORIDE mmol/L 102  --  99* 101 97*  99*   CO2 mmol/L 27  --  27 21 21  23   ANION GAP mmol/L 12  --  10 15* 16*  14*   BUN mg/dL 42*  --  34* 91* 83*  79*   CREATININE mg/dL 5 13*  --  4 36* 7 56* 7 29*  7 43*   EGFR ml/min/1 73sq m 11  --  14 7 7  7   CALCIUM mg/dL 8 1*  --  8 5 8 1* 8 7  8 5   CALCIUM, IONIZED mmol/L  --  1 02*  --   --   --    MAGNESIUM mg/dL  --   --  1 9  --   --      Results from last 7 days   Lab Units 12/31/20  0636 12/30/20  2109 12/27/20  0451 12/26/20  1304   AST U/L 27 26 15 16   ALT U/L 30 31 19 20   ALK PHOS U/L 73 90 90 89   TOTAL PROTEIN g/dL 6 8 7 1 6 9 7 5   ALBUMIN g/dL 2 9* 3 1* 3 2* 3 5 TOTAL BILIRUBIN mg/dL 0 48 0 54 0 31 0 37     Results from last 7 days   Lab Units 12/31/20  1201 12/31/20  0714 12/27/20  1100 12/27/20  0709 12/26/20  2046 12/26/20  1647   POC GLUCOSE mg/dl 94 92 109 100 143* 196*     Results from last 7 days   Lab Units 12/31/20  0636 12/30/20  2109 12/27/20  0451 12/26/20  1304   GLUCOSE RANDOM mg/dL 102 163* 106 109  111           Results from last 7 days   Lab Units 12/30/20  2109 12/26/20  1526   CK TOTAL U/L 385* 250   CK MB INDEX % <1 0 1 2   CK MB ng/mL 2 0 3 0     Results from last 7 days   Lab Units 12/31/20  1009 12/31/20  0636 12/31/20  0129 12/26/20  1526   TROPONIN I ng/mL 0 14* 0 13* 0 10* 0 04     Results from last 7 days   Lab Units 12/31/20  0636 12/26/20  1526   D-DIMER QUANTITATIVE ug/ml FEU 1 64* 1 21*     Results from last 7 days   Lab Units 12/30/20  2109   PROTIME seconds 14 4   INR  1 11         Results from last 7 days   Lab Units 12/31/20  0043 12/27/20  0451 12/26/20  1526   PROCALCITONIN ng/ml 0 20 0 19 0 15     Results from last 7 days   Lab Units 12/31/20  0043 12/26/20  1526   LACTIC ACID mmol/L 0 7 0 7             Results from last 7 days   Lab Units 12/30/20  2109 12/26/20  1526   NT-PRO BNP pg/mL 1,259* 1,579*     Results from last 7 days   Lab Units 12/31/20  0636 12/30/20  2109 12/26/20  1526   FERRITIN ng/mL 952* 1,020* 573*             Results from last 7 days   Lab Units 12/31/20  0636 12/30/20  2109 12/26/20  1526   CRP mg/L 78 6* 62 7* 34 8*             Results from last 7 days   Lab Units 12/26/20  1535   INFLUENZA A PCR  Negative   INFLUENZA B PCR  Negative   RSV PCR  Negative           Results from last 7 days   Lab Units 12/31/20  0129 12/31/20  0043 12/26/20  1533 12/26/20  1526   BLOOD CULTURE  Received in Microbiology Lab  Culture in Progress  Received in Microbiology Lab  Culture in Progress  No Growth After 4 Days  No Growth After 4 Days           ED Treatment:   Medication Administration from 12/30/2020 2055 to 12/31/2020 0181 Date/Time Order Dose Route Action     12/31/2020 0142 cefepime (MAXIPIME) 1,000 mg in dextrose 5 % 50 mL IVPB 1,000 mg Intravenous New Bag     12/31/2020 0446 vancomycin (VANCOCIN) 1,250 mg in sodium chloride 0 9 % 250 mL IVPB 1,250 mg Intravenous New Bag     12/31/2020 0736 carvedilol (COREG) tablet 6 25 mg 6 25 mg Oral Given     12/31/2020 9622 heparin (porcine) subcutaneous injection 5,000 Units 5,000 Units Subcutaneous Given        Past Medical History:   Diagnosis Date    Chronic kidney disease     Diabetes mellitus ([facility] )     GERD (gastroesophageal reflux disease)     Hypercholesteremia     Hyperlipidemia     Hypertension     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    Neuropathy     Obesity     Osteomyelitis ([place] [address] )     last assessed 11/4/16    PVC's (premature ventricular contractions)     sees cardiology Dr Chris camargo    Stroke [facility])     last weeof July 2018 [address]     Present on Admission:   COVID-19 virus infection   Hypertension   Type 2 diabetes mellitus ([facility] )      Admitting Diagnosis: Lab test positive for detection of COVID-19 virus [U07 1]  Vomiting [R11 10]  Age/Sex: 61 y o  male  Admission Orders:  Scheduled Medications:  ascorbic acid, 1,000 mg, Oral, Q12H [facility]  aspirin, 81 mg, Oral, Daily  atorvastatin, 40 mg, Oral, Daily With Dinner  carvedilol, 6 25 mg, Oral, BID With Meals  cholecalciferol, 1,000 Units, Oral, Daily  heparin (porcine), 5,000 Units, Subcutaneous, Q8H [facility]  insulin glargine, 10 Units, Subcutaneous, QAM  insulin lispro, 1-5 Units, Subcutaneous, QAM  insulin lispro, 1-6 Units, Subcutaneous, TID AC  ondansetron, 4 mg, Intravenous, Once  [START ON 1/1/2021] remdesivir, 100 mg, Intravenous, Q24H  torsemide, 50 mg, Oral, Daily  zinc sulfate, 220 mg, Oral, Daily      Continuous IV Infusions:     PRN Meds:  acetaminophen, 650 mg, Oral, Q6H PRN  ondansetron, 4 mg, Intravenous, Q6H PRN      PPoct glucose  QID  O2 as needed, notify of increased O2 use  OOB as jose miguel  SCD's  Mati /CHO controlled level 2 diet  Isolation airborne and contact            IP CONSULT TO NEPHROLOGY    Network Utilization Review Department  ATTENTION: Please call with any questions or concerns to [phone] and carefully listen to the prompts so that you are directed to the right person  All voicemails are confidential   Brooke Hagen all requests for admission clinical reviews, approved or denied determinations and any other requests to dedicated fax number below belonging to the campus where the patient is receiving treatment   List of dedicated fax numbers for the Facilities:  [address] DENIALS (Administrative/Medical Necessity) [phone]   [address] (Maternity/NICU/Pediatrics) [phone]   [address] Travis Marin 4097 (Bettie Michaud "Kayla" 103) [address] [place] [address] [address] [place] [address] [phone]

## 2020-12-31 NOTE — ASSESSMENT & PLAN NOTE
Lab Results   Component Value Date    HGBA1C 5 8 (H) 06/08/2020       Recent Labs     12/31/20  0714 12/31/20  1201   POCGLU 92 94       Blood Sugar Average: Last 72 hrs:  · patient on insulin 10 units glargine daily  · Hyperglycemia noted, likely in the setting of steroid therapy  Plan  · Continue home insuline regimen + SSI  · Will monitor Glycemic levels daily as patient on steroid therapy  · Avoid Hypoglycemia  · Diabetic Diet  · Continue monitoring

## 2020-12-31 NOTE — ASSESSMENT & PLAN NOTE
Lab Results   Component Value Date    HGBA1C 5 8 (H) 06/08/2020       No results for input(s): POCGLU in the last 72 hours      Blood Sugar Average: Last 72 hrs:  · patient on insulin 10 units glargine daily  · Give 10 units Lantus in the morning and insulin with sliding scale with meals  · Hypoglycemia protocol  · Diet ARASH/CH controlled, level 2

## 2020-12-31 NOTE — ASSESSMENT & PLAN NOTE
Lab Results   Component Value Date    EGFR 11 12/31/2020    EGFR 14 12/30/2020    EGFR 7 12/27/2020    CREATININE 5 13 (H) 12/31/2020    CREATININE 4 36 (H) 12/30/2020    CREATININE 7 56 (H) 12/27/2020   · Patient with ESRD stage 5 on hemodialysis, Monday Wednesday Friday, with left AVF    · Patient had hemodialysis today  · Nephrology consulted , considered next hemodialysis treatment will be on Saturday due to holiday schedule

## 2020-12-31 NOTE — ASSESSMENT & PLAN NOTE
· On carvedilol, torsemide as home medication  · Blood pressure 154/72 mmHg    Continue home medication and monitor blood pressure

## 2020-12-31 NOTE — ED NOTES
Patients son requesting a phone call to talk to patient  Patient spoke to his son on his cell phone        Lauren Tovar RN  12/31/20 [id number]

## 2021-01-01 LAB
ABO GROUP BLD: NORMAL
ANION GAP SERPL CALCULATED.3IONS-SCNC: 14 MMOL/L (ref 4–13)
BACTERIA BLD CULT: NORMAL
BACTERIA BLD CULT: NORMAL
BASOPHILS # BLD MANUAL: 0.03 THOUSAND/UL (ref 0–0.1)
BASOPHILS NFR MAR MANUAL: 1 % (ref 0–1)
BLD GP AB SCN SERPL QL: NEGATIVE
BUN SERPL-MCNC: 60 MG/DL (ref 5–25)
CALCIUM SERPL-MCNC: 8.2 MG/DL (ref 8.3–10.1)
CHLORIDE SERPL-SCNC: 101 MMOL/L (ref 100–108)
CO2 SERPL-SCNC: 23 MMOL/L (ref 21–32)
CREAT SERPL-MCNC: 5.92 MG/DL (ref 0.6–1.3)
CRP SERPL QL: 149.1 MG/L
EOSINOPHIL # BLD MANUAL: 0 THOUSAND/UL (ref 0–0.4)
EOSINOPHIL NFR BLD MANUAL: 0 % (ref 0–6)
ERYTHROCYTE [DISTWIDTH] IN BLOOD BY AUTOMATED COUNT: 14.3 % (ref 11.6–15.1)
GFR SERPL CREATININE-BSD FRML MDRD: 9 ML/MIN/1.73SQ M
GLUCOSE SERPL-MCNC: 140 MG/DL (ref 65–140)
GLUCOSE SERPL-MCNC: 168 MG/DL (ref 65–140)
GLUCOSE SERPL-MCNC: 172 MG/DL (ref 65–140)
GLUCOSE SERPL-MCNC: 195 MG/DL (ref 65–140)
GLUCOSE SERPL-MCNC: 216 MG/DL (ref 65–140)
HCT VFR BLD AUTO: 31.7 % (ref 36.5–49.3)
HGB BLD-MCNC: 9.9 G/DL (ref 12–17)
LYMPHOCYTES # BLD AUTO: 0.34 THOUSAND/UL (ref 0.6–4.47)
LYMPHOCYTES # BLD AUTO: 13 % (ref 14–44)
MCH RBC QN AUTO: 29.1 PG (ref 26.8–34.3)
MCHC RBC AUTO-ENTMCNC: 31.2 G/DL (ref 31.4–37.4)
MCV RBC AUTO: 93 FL (ref 82–98)
MONOCYTES # BLD AUTO: 0.13 THOUSAND/UL (ref 0–1.22)
MONOCYTES NFR BLD: 5 % (ref 4–12)
NEUTROPHILS # BLD MANUAL: 2.12 THOUSAND/UL (ref 1.85–7.62)
NEUTS BAND NFR BLD MANUAL: 13 % (ref 0–8)
NEUTS SEG NFR BLD AUTO: 67 % (ref 43–75)
NRBC BLD AUTO-RTO: 0 /100 WBCS
PLATELET # BLD AUTO: 99 THOUSANDS/UL (ref 149–390)
PLATELET BLD QL SMEAR: ABNORMAL
PMV BLD AUTO: 10.5 FL (ref 8.9–12.7)
POTASSIUM SERPL-SCNC: 4 MMOL/L (ref 3.5–5.3)
PROCALCITONIN SERPL-MCNC: 0.19 NG/ML
RBC # BLD AUTO: 3.4 MILLION/UL (ref 3.88–5.62)
RBC MORPH BLD: NORMAL
RH BLD: POSITIVE
SODIUM SERPL-SCNC: 138 MMOL/L (ref 136–145)
SPECIMEN EXPIRATION DATE: NORMAL
TOTAL CELLS COUNTED SPEC: 100
VARIANT LYMPHS # BLD AUTO: 1 %
WBC # BLD AUTO: 2.65 THOUSAND/UL (ref 4.31–10.16)

## 2021-01-01 PROCEDURE — 86850 RBC ANTIBODY SCREEN: CPT | Performed by: INTERNAL MEDICINE

## 2021-01-01 PROCEDURE — 99232 SBSQ HOSP IP/OBS MODERATE 35: CPT | Performed by: INTERNAL MEDICINE

## 2021-01-01 PROCEDURE — 82948 REAGENT STRIP/BLOOD GLUCOSE: CPT

## 2021-01-01 PROCEDURE — 86900 BLOOD TYPING SEROLOGIC ABO: CPT | Performed by: INTERNAL MEDICINE

## 2021-01-01 PROCEDURE — 80048 BASIC METABOLIC PNL TOTAL CA: CPT | Performed by: INTERNAL MEDICINE

## 2021-01-01 PROCEDURE — 86901 BLOOD TYPING SEROLOGIC RH(D): CPT | Performed by: INTERNAL MEDICINE

## 2021-01-01 PROCEDURE — 85007 BL SMEAR W/DIFF WBC COUNT: CPT | Performed by: INTERNAL MEDICINE

## 2021-01-01 PROCEDURE — 84145 PROCALCITONIN (PCT): CPT | Performed by: EMERGENCY MEDICINE

## 2021-01-01 PROCEDURE — 86140 C-REACTIVE PROTEIN: CPT | Performed by: INTERNAL MEDICINE

## 2021-01-01 PROCEDURE — 85027 COMPLETE CBC AUTOMATED: CPT | Performed by: INTERNAL MEDICINE

## 2021-01-01 RX ORDER — CALCIUM ACETATE 667 MG/1
1334 CAPSULE ORAL
Status: DISCONTINUED | OUTPATIENT
Start: 2021-01-01 | End: 2021-01-02 | Stop reason: HOSPADM

## 2021-01-01 RX ORDER — CALCITRIOL 0.25 UG/1
0.5 CAPSULE, LIQUID FILLED ORAL 3 TIMES WEEKLY
Status: DISCONTINUED | OUTPATIENT
Start: 2021-01-01 | End: 2021-01-02 | Stop reason: HOSPADM

## 2021-01-01 RX ORDER — LANOLIN ALCOHOL/MO/W.PET/CERES
3 CREAM (GRAM) TOPICAL
Status: DISCONTINUED | OUTPATIENT
Start: 2021-01-01 | End: 2021-01-02 | Stop reason: HOSPADM

## 2021-01-01 RX ADMIN — HEPARIN SODIUM 5000 UNITS: 5000 INJECTION INTRAVENOUS; SUBCUTANEOUS at 05:41

## 2021-01-01 RX ADMIN — ZINC SULFATE 220 MG (50 MG) CAPSULE 220 MG: CAPSULE at 09:00

## 2021-01-01 RX ADMIN — CALCIUM ACETATE 1334 MG: 667 CAPSULE ORAL at 16:22

## 2021-01-01 RX ADMIN — TORSEMIDE 50 MG: 20 TABLET ORAL at 09:00

## 2021-01-01 RX ADMIN — CALCITRIOL 0.5 MCG: 0.25 CAPSULE ORAL at 13:56

## 2021-01-01 RX ADMIN — OXYCODONE HYDROCHLORIDE AND ACETAMINOPHEN 1000 MG: 500 TABLET ORAL at 21:51

## 2021-01-01 RX ADMIN — Medication 1000 UNITS: at 08:59

## 2021-01-01 RX ADMIN — INSULIN LISPRO 1 UNITS: 100 INJECTION, SOLUTION INTRAVENOUS; SUBCUTANEOUS at 09:01

## 2021-01-01 RX ADMIN — HEPARIN SODIUM 5000 UNITS: 5000 INJECTION INTRAVENOUS; SUBCUTANEOUS at 21:52

## 2021-01-01 RX ADMIN — INSULIN GLARGINE 10 UNITS: 100 INJECTION, SOLUTION SUBCUTANEOUS at 09:02

## 2021-01-01 RX ADMIN — MELATONIN 3 MG: at 21:51

## 2021-01-01 RX ADMIN — CARVEDILOL 6.25 MG: 6.25 TABLET, FILM COATED ORAL at 16:22

## 2021-01-01 RX ADMIN — CARVEDILOL 6.25 MG: 6.25 TABLET, FILM COATED ORAL at 08:59

## 2021-01-01 RX ADMIN — ATORVASTATIN CALCIUM 40 MG: 40 TABLET, FILM COATED ORAL at 16:22

## 2021-01-01 RX ADMIN — DEXAMETHASONE SODIUM PHOSPHATE 6 MG: 4 INJECTION, SOLUTION INTRA-ARTICULAR; INTRALESIONAL; INTRAMUSCULAR; INTRAVENOUS; SOFT TISSUE at 21:52

## 2021-01-01 RX ADMIN — INSULIN LISPRO 2 UNITS: 100 INJECTION, SOLUTION INTRAVENOUS; SUBCUTANEOUS at 16:25

## 2021-01-01 RX ADMIN — ASPIRIN 81 MG: 81 TABLET ORAL at 09:00

## 2021-01-01 RX ADMIN — CALCIUM ACETATE 1334 MG: 667 CAPSULE ORAL at 13:56

## 2021-01-01 RX ADMIN — INSULIN LISPRO 1 UNITS: 100 INJECTION, SOLUTION INTRAVENOUS; SUBCUTANEOUS at 11:50

## 2021-01-01 RX ADMIN — REMDESIVIR 100 MG: 100 INJECTION, POWDER, LYOPHILIZED, FOR SOLUTION INTRAVENOUS at 13:32

## 2021-01-01 RX ADMIN — OXYCODONE HYDROCHLORIDE AND ACETAMINOPHEN 1000 MG: 500 TABLET ORAL at 08:59

## 2021-01-01 RX ADMIN — HEPARIN SODIUM 5000 UNITS: 5000 INJECTION INTRAVENOUS; SUBCUTANEOUS at 13:31

## 2021-01-01 NOTE — UTILIZATION REVIEW
Initial Clinical Review    OBSERVATION 12/31/20 @0102 CONVERTED TO INPATIENT ADMISSION 12/31/20 @1609 DUE TO CONTINUED STAY REQUIRED TO EVALUATE AND TREAT PATIENT WITH COVID-19 WHO DESATS ON RA WITH OXYGEN, IV REMDESIVIR AND IV STEROIDS ALONG WITH BLOOD SUGAR MONITORING WHILE ON STEROIDS    Admission: Date/Time/Statement:   Admission Orders (From admission, onward)     Ordered        12/31/20 1609  Inpatient Admission  Once         12/31/20 0102  Place in Observation  Once                   Orders Placed This Encounter   Procedures    Inpatient Admission     Standing Status:   Standing     Number of Occurrences:   1     Order Specific Question:   Admitting Physician     Answer:   Corrinne Malay [[id number]]     Order Specific Question:   Level of Care     Answer:   Med Surg [[id number]]     Order Specific Question:   Estimated length of stay     Answer:   More than 2 Midnights     Order Specific Question:   Certification     Answer:   I certify that inpatient services are medically necessary for this patient for a duration of greater than two midnights  See H&P and MD Progress Notes for additional information about the patient's course of treatment  ED Arrival Information     Expected Arrival Acuity Means of Arrival Escorted By Service Admission Type    - 12/30/2020 20:55 Urgent Wheelchair Family Member Hospitalist Urgent    Arrival Complaint    covid + vomiting,        Chief Complaint   Patient presents with    Vomiting     Patient is covid + and has diarrhea today  Notes to have dialysis today as well  Assessment/Plan: 62 yo male with hx of ESRD on HD MWF,type 2 diabetes, CVA, hypertension,   + COVID PNA 12/26/20 hospitalization tx w/ Vit C and D/zinc presents to ED after dialysis today with generalized myalgia,abdom pain , loose bowels,cough  On exam pt anxious, abdomen distended, normal lung sounds  Pt given IV abx in ED   CXR shows worsening ground glass opacities bilat , greater on R , compatible with COVID pneumonia    Labs show leukopenia likely 2/2 viral infection with pancytopenia  Pt not requiring O2 presently with good RA sats  Pt admitted as OBS with COVID 19 virus -mild  Plan is for procal check and hold on further abx for now, inflammarory markers, vitamin cocktail,self proning , spirometry, ambulation   12/31  AM note-Pt continues to sat well on RA, will not start remdesivir or steroids  Will place nephrology consult for HD due 1/1   -nephrology-Pt appears to have signif wt gain between treatments, pt refused dose of Torsemide this am and only took 10 mg per ns discussion, prescribed 50 mg, unclear reason for pt noncompliance with tx plan  Pt next due for HD 1/2/21 due to holiday schedule  Encourage full dose of diuretic and continue current meds    Afternoon note-Pt now on O2 due to desat to 80's on RA ,Lungs clear bilat  Pts inflammatory markers are elevated from previous admission, will start IV REmdesivir and steroids per mild protocol, continue O2 to keep sats>92  Plan to monitor Blood sugars while on steroids and adjust insulin as needed       1/1 Patient continues to require supplemental O2 up to 3 l NC  Labs show worsening inflammatory markers  CRP increased to 149 from 78 6   Blood cultures negative x 24 hrs Breath sounds decreased RLL with few rhonchi  left arm AVF intact  HD planned for 1/2  Nephrology following     ED Triage Vitals [12/30/20 2102]   Temperature Pulse Respirations Blood Pressure SpO2   99 °F (37 2 °C) 94 16 154/72 95 %      Temp Source Heart Rate Source Patient Position - Orthostatic VS BP Location FiO2 (%)   Oral Monitor Lying Right arm --      Pain Score       7          Wt Readings from Last 1 Encounters:   12/30/20 99 8 kg (220 lb)     Additional Vital Signs:   Date/Time  Temp  Pulse  Resp  BP  MAP (mmHg)  SpO2  Calculated FIO2 (%) - Nasal Cannula  Nasal Cannula O2 Flow Rate (L/min)  O2 Device    12/31/20 1452    80  16  139/57    98 %  28  2 L/min  Nasal cannula 12/31/20 1300    76    183/73Abnormal   115  95 %          12/31/20 1218  98 6 °F (37 °C)                    12/31/20 1200    76  20  168/73  105  94 %  28  2 L/min  Nasal cannula    12/31/20 1100    76  20  144/63  91  95 %  28  2 L/min  Nasal cannula    12/31/20 1000    78  20  162/68  98  92 %  28  2 L/min  Nasal cannula        Date/Time  Temp  Pulse  Resp  BP  SpO2    12/31/20 0736    87  20  152/99  95 %    12/31/20 0726  100 2 °F (37 9 °C)  85  18  176/77Abnormal   93 %    12/31/20 0639    82  18  158/80  95 %        Pertinent Labs/Diagnostic Test Results:   12/30 CXR  Worsening bilateral groundglass opacity, greater on the right, compatible with Covid-19 pneumonia    12/30 ECG  ECG rate:  89    ECG rate assessment: normal    Rhythm:     Rhythm: sinus rhythm    Ectopy:     Ectopy: none    QRS:     QRS axis:  Normal    QRS intervals:  Normal  Conduction:     Conduction: normal    ST segments:     ST segments:  Normal  T waves:     T waves: normal    Results from last 7 days   Lab Units 12/26/20  1535   SARS-COV-2  Positive*     Results from last 7 days   Lab Units 01/01/21  0605 12/31/20 0636 12/30/20 2109 12/27/20 0451 12/26/20  1526   WBC Thousand/uL 2 65* 3 09* 3 00* 3 53* 3 98*   HEMOGLOBIN g/dL 9 9* 9 6* 10 3* 11 0* 10 8*   HEMATOCRIT % 31 7* 30 2* 31 6* 34 0* 33 0*   PLATELETS Thousands/uL 99* 103* 103* 112* 115*  112*   NEUTROS ABS Thousands/µL  --   --  1 98 2 01 2 57   BANDS PCT % 13*  --   --   --   --          Results from last 7 days   Lab Units 01/01/21  0605 12/31/20  0636 12/31/20  0043 12/30/20 2109 12/27/20 0451 12/26/20  1304   SODIUM mmol/L 138 141  --  136 137 134*  136   POTASSIUM mmol/L 4 0 3 7  --  3 4* 3 9 4 1  4 2   CHLORIDE mmol/L 101 102  --  99* 101 97*  99*   CO2 mmol/L 23 27  --  27 21 21  23   ANION GAP mmol/L 14* 12  --  10 15* 16*  14*   BUN mg/dL 60* 42*  --  34* 91* 83*  79*   CREATININE mg/dL 5 92* 5 13*  --  4 36* 7 56* 7 29*  7 43*   EGFR ml/min/1 73sq m 9 11  --  14 7 7  7   CALCIUM mg/dL 8 2* 8 1*  --  8 5 8 1* 8 7  8 5   CALCIUM, IONIZED mmol/L  --   --  1 02*  --   --   --    MAGNESIUM mg/dL  --   --   --  1 9  --   --      Results from last 7 days   Lab Units 12/31/20  0636 12/30/20  2109 12/27/20  0451 12/26/20  1304   AST U/L 27 26 15 16   ALT U/L 30 31 19 20   ALK PHOS U/L 73 90 90 89   TOTAL PROTEIN g/dL 6 8 7 1 6 9 7 5   ALBUMIN g/dL 2 9* 3 1* 3 2* 3 5   TOTAL BILIRUBIN mg/dL 0 48 0 54 0 31 0 37     Results from last 7 days   Lab Units 01/01/21  1617 01/01/21  1136 01/01/21  0803 12/31/20  2156 12/31/20  1748 12/31/20  1201 12/31/20  0714 12/27/20  1100 12/27/20  0709 12/26/20  2046 12/26/20  1647   POC GLUCOSE mg/dl 216* 195* 168* 85 140 94 92 109 100 143* 196*     Results from last 7 days   Lab Units 01/01/21  0605 12/31/20  0636 12/30/20  2109 12/27/20  0451 12/26/20  1304   GLUCOSE RANDOM mg/dL 140 102 163* 106 109  111           Results from last 7 days   Lab Units 12/30/20  2109 12/26/20  1526   CK TOTAL U/L 385* 250   CK MB INDEX % <1 0 1 2   CK MB ng/mL 2 0 3 0     Results from last 7 days   Lab Units 12/31/20  1009 12/31/20  0636 12/31/20  0129 12/26/20  1526   TROPONIN I ng/mL 0 14* 0 13* 0 10* 0 04     Results from last 7 days   Lab Units 12/31/20  0636 12/26/20  1526   D-DIMER QUANTITATIVE ug/ml FEU 1 64* 1 21*     Results from last 7 days   Lab Units 12/30/20  2109   PROTIME seconds 14 4   INR  1 11         Results from last 7 days   Lab Units 01/01/21  0605 12/31/20  0043 12/27/20  0451 12/26/20  1526   PROCALCITONIN ng/ml 0 19 0 20 0 19 0 15     Results from last 7 days   Lab Units 12/31/20  0043 12/26/20  1526   LACTIC ACID mmol/L 0 7 0 7             Results from last 7 days   Lab Units 12/30/20  2109 12/26/20  1526   NT-PRO BNP pg/mL 1,259* 1,579*     Results from last 7 days   Lab Units 12/31/20  0636 12/30/20 2109 12/26/20  1526   FERRITIN ng/mL 952* 1,020* 573*             Results from last 7 days   Lab Units 01/01/21  0605 12/31/20  0636 12/30/20  2109 12/26/20  1526   CRP mg/L 149 1* 78 6* 62 7* 34 8*             Results from last 7 days   Lab Units 12/26/20  1535   INFLUENZA A PCR  Negative   INFLUENZA B PCR  Negative   RSV PCR  Negative           Results from last 7 days   Lab Units 12/31/20  0129 12/31/20  0043 12/26/20  1533 12/26/20  1526   BLOOD CULTURE  No Growth at 24 hrs  No Growth at 24 hrs  No Growth After 5 Days  No Growth After 5 Days       Results from last 7 days   Lab Units 01/01/21  0605   TOTAL COUNTED  100     ED Treatment:   Medication Administration from 12/30/2020 2055 to 12/31/2020 2584       Date/Time Order Dose Route Action     12/31/2020 0142 cefepime (MAXIPIME) 1,000 mg in dextrose 5 % 50 mL IVPB 1,000 mg Intravenous New Bag     12/31/2020 0446 vancomycin (VANCOCIN) 1,250 mg in sodium chloride 0 9 % 250 mL IVPB 1,250 mg Intravenous New Bag     12/31/2020 0736 carvedilol (COREG) tablet 6 25 mg 6 25 mg Oral Given     12/31/2020 4335 heparin (porcine) subcutaneous injection 5,000 Units 5,000 Units Subcutaneous Given        Past Medical History:   Diagnosis Date    Chronic kidney disease     Diabetes mellitus ([facility] )     GERD (gastroesophageal reflux disease)     Hypercholesteremia     Hyperlipidemia     Hypertension     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    Neuropathy     Obesity     [facility]  )     last assessed 11/4/16    PVC's (premature ventricular contractions)     sees cardiology Dr Sid camargo    Stroke [facility])     last weeof July 2018 [address] [facility]     Present on Admission:   COVID-19 virus infection   Hypertension   Type 2 diabetes mellitus ([facility] )      Admitting Diagnosis: Vomiting [R11 10]  Nausea and vomiting [R11 2]  ESRD on hemodialysis ([facility] ) [N18 6, Z99 2]  Lab test positive for detection of COVID-19 virus [U07 1]  Pneumonia due to COVID-19 virus [U07 1, J12 82]  Age/Sex: 61 y o  male  Admission Orders:  Scheduled Medications:  ascorbic acid, 1,000 mg, Oral, Q12H [address]  aspirin, 81 mg, Oral, Daily  atorvastatin, 40 mg, Oral, Daily With Dinner  calcitriol, 0 5 mcg, Oral, Once per day on Mon Wed Fri  calcium acetate, 1,334 mg, Oral, TID With Meals  carvedilol, 6 25 mg, Oral, BID With Meals  cholecalciferol, 1,000 Units, Oral, Daily  dexamethasone, 6 mg, Intravenous, Q24H  heparin (porcine), 5,000 Units, Subcutaneous, Q8H [facility]  insulin glargine, 10 Units, Subcutaneous, QAM  insulin lispro, 1-5 Units, Subcutaneous, QAM  insulin lispro, 1-6 Units, Subcutaneous, TID AC  ondansetron, 4 mg, Intravenous, Once  remdesivir, 100 mg, Intravenous, Q24H  torsemide, 50 mg, Oral, Daily  zinc sulfate, 220 mg, Oral, Daily      Continuous IV Infusions:     PRN Meds:  acetaminophen, 650 mg, Oral, Q6H PRN  ondansetron, 4 mg, Intravenous, Q6H PRN      PPoct glucose  QID  O2 as needed, notify of increased O2 use  OOB as jose miguel  SCD's  Mati /CHO controlled level 2 diet  Isolation airborne and contact            IP CONSULT TO NEPHROLOGY    Network Utilization Review Department  ATTENTION: Please call with any questions or concerns to [phone] and carefully listen to the prompts so that you are directed to the right person  All voicemails are confidential   Kiki Wright all requests for admission clinical reviews, approved or denied determinations and any other requests to dedicated fax number below belonging to the campus where the patient is receiving treatment   List of dedicated fax numbers for the Facilities:  [address] DENIALS (Administrative/Medical Necessity) [phone]   [address] (Maternity/NICU/Pediatrics) 261 [facility] Travis Marin 5176 (Bettie Michaud "Kayla" 103) [address] [facility] Aleida Jensen 28 Juan Marycarmen Crespo 1481 [address] [place] [address] [facility] [phone]

## 2021-01-01 NOTE — PROGRESS NOTES
Progress Note - Nubia Landers 1960, 61 y o  male MRN: [id number]    Unit/Bed#: S -01 Encounter: [id number]    Primary Care Provider: Rafaela Pedroza DO   Date and time admitted to hospital: 12/30/2020  9:22 PM        * COVID-19 virus infection  Assessment & Plan  · Recently diagnosed with covid 19 ( 12/23/20) was placed in the mild protocol, no steroids or remdesivir were given in his prior hospitalization, admitted due to  generalized weakness, malaise, 2 loose  bowel movements on admission , (-) anosmia, (-) ageusia  · On admission : Spo2 95% Chest x-ray showed worsening ground-glass opacity greater on the right side compatible with Covid 19 , Emergency department patient received ceftriaxone and vancomycin, Through the day noted to desaturate to 88%  needed O2 NC 2L to maintain sPo2 > 90%,Inflamatory markers noted to be elevated,  As well as leukopenia , considering thi Steroids and Remdesivir by protocol initiated, procalc neg  · Due to worsening inflammatory markers, Convalescent plasma was considered , patient was explained on detailed about it however he declined    Plan:        Contact and airborne isolations  · Covid 19 protocol, continue corticosteroids and remdesivir   · Procalc neg , would not add Ab at this time  · Self proning  · Therapeutic systemic anticoagulation  · Incentive spirometry  · Monitor spO2      ESRD on dialysis [facility])  Assessment & Plan  Lab Results   Component Value Date    EGFR 11 12/31/2020    EGFR 14 12/30/2020    EGFR 7 12/27/2020    CREATININE 5 13 (H) 12/31/2020    CREATININE 4 36 (H) 12/30/2020    CREATININE 7 56 (H) 12/27/2020   · Patient with ESRD stage 5 on hemodialysis, Monday Wednesday Friday, with left AVF  · Patient had hemodialysis today  · Nephrology consulted , considered next hemodialysis treatment will be on Saturday due to holiday schedule    Pancytopenia ([address] )  Assessment & Plan  ·  Chronic anemia likely from end-stage chronic disease    Currently at baseline  · Leukopenia- likely in the context viral infection  · Chronic Thrombocytopenia  Plan  · Continue Monitoring    Hypertension  Assessment & Plan  · On carvedilol, torsemide as home medication  · Blood pressure acceptable  Plan  · Continue home medication and monitor blood pressure    History of stroke  Assessment & Plan  · Thrombosis of left MCA in 2018  · No residual symptoms, except cognitive impairment (per notes)  · Poor historian    Type 2 diabetes mellitus ([facility] )  Assessment & Plan  Lab Results   Component Value Date    HGBA1C 5 8 (H) 2020       Recent Labs     20  0714 20  1201   POCGLU 92 94       Blood Sugar Average: Last 72 hrs:  · patient on insulin 10 units glargine daily  · Hyperglycemia noted, likely in the setting of steroid therapy  Plan  · Continue home insuline regimen + SSI  · Will monitor Glycemic levels daily as patient on steroid therapy  · Avoid Hypoglycemia  · Diabetic Diet  · Continue monitoring        VTE Pharmacologic Prophylaxis:   Pharmacologic: Heparin  Mechanical VTE Prophylaxis in Place: Yes    Discussions with Specialists or Other Care Team Provider: attending physician    Education and Discussions with Family / Patient: patient has been fully educated about his current diagnosis and treatment plan, I called patients family for an update , unfortunately I couldn't get a hold of nobody I left a voice mail  Current Length of Stay: 1 day(s)    Current Patient Status: Inpatient     Discharge Plan / Estimated Discharge Date: Not yet established    Code Status: Level 1 - Full Code      Subjective:   During my encounter, patient eating breakfast , w/o O2 NC, no distress noted, improvement in generalized malaise and weakness, no other symptomatology      Objective:     Vitals:   Temp (24hrs), Av °F (37 2 °C), Min:97 6 °F (36 4 °C), Max:99 9 °F (37 7 °C)    Temp:  [97 6 °F (36 4 °C)-99 9 °F (37 7 °C)] 97 6 °F (36 4 °C)  HR:  [78-84] 81  Resp:  [16-20] 18  BP: (139-159)/(57-90) 144/69  SpO2:  [92 %-98 %] 97 %  Body mass index is 31 57 kg/m²  Input and Output Summary (last 24 hours): Intake/Output Summary (Last 24 hours) at 1/1/2021 1302  Last data filed at 1/1/2021 0500  Gross per 24 hour   Intake 0 ml   Output 200 ml   Net -200 ml       Physical Exam:     Physical Exam  Vitals signs and nursing note reviewed  Constitutional:       General: He is not in acute distress  Appearance: He is obese  He is not toxic-appearing  HENT:      Head: Normocephalic and atraumatic  Right Ear: Tympanic membrane normal       Left Ear: Tympanic membrane normal       Nose: Nose normal  No congestion or rhinorrhea  Mouth/Throat:      Mouth: Mucous membranes are moist       Pharynx: Oropharynx is clear  No oropharyngeal exudate or posterior oropharyngeal erythema  Eyes:      Extraocular Movements: Extraocular movements intact  Conjunctiva/sclera: Conjunctivae normal       Pupils: Pupils are equal, round, and reactive to light  Neck:      Musculoskeletal: Normal range of motion and neck supple  No neck rigidity or muscular tenderness  Cardiovascular:      Rate and Rhythm: Normal rate  Pulses: Normal pulses  Heart sounds: No murmur  No gallop  Pulmonary:      Effort: Pulmonary effort is normal  No respiratory distress  Breath sounds: No wheezing or rales  Chest:      Chest wall: No tenderness  Abdominal:      General: Bowel sounds are normal  There is no distension  Palpations: Abdomen is soft  Tenderness: There is no abdominal tenderness  Musculoskeletal: Normal range of motion  General: No swelling or tenderness  Skin:     General: Skin is warm  Capillary Refill: Capillary refill takes 2 to 3 seconds  Neurological:      Mental Status: He is alert and oriented to person, place, and time     Psychiatric:         Mood and Affect: Mood normal          Behavior: Behavior normal          Thought Content: Thought content normal          Judgment: Judgment normal            Additional Data:     Labs:    Results from last 7 days   Lab Units 01/01/21  0605  12/30/20  2109   WBC Thousand/uL 2 65*   < > 3 00*   HEMOGLOBIN g/dL 9 9*   < > 10 3*   HEMATOCRIT % 31 7*   < > 31 6*   PLATELETS Thousands/uL 99*   < > 103*   NEUTROS PCT %  --   --  66   LYMPHS PCT %  --   --  16   LYMPHO PCT % 13*  --   --    MONOS PCT %  --   --  16*   MONO PCT % 5  --   --    EOS PCT % 0  --  0    < > = values in this interval not displayed  Results from last 7 days   Lab Units 01/01/21  0605 12/31/20  0636   POTASSIUM mmol/L 4 0 3 7   CHLORIDE mmol/L 101 102   CO2 mmol/L 23 27   BUN mg/dL 60* 42*   CREATININE mg/dL 5 92* 5 13*   CALCIUM mg/dL 8 2* 8 1*   ALK PHOS U/L  --  73   ALT U/L  --  30   AST U/L  --  27     Results from last 7 days   Lab Units 12/30/20  2109   INR  1 11       * I Have Reviewed All Lab Data Listed Above  * Additional Pertinent Lab Tests Reviewed: Mek[address] Admission Reviewed    Imaging:    Imaging Reports Reviewed Today Include: none  Imaging Personally Reviewed by Myself Includes: none    Recent Cultures (last 7 days):     Results from last 7 days   Lab Units 12/31/20  0129 12/31/20  0043 12/26/20  1533 12/26/20  1526   BLOOD CULTURE  No Growth at 24 hrs  No Growth at 24 hrs  No Growth After 5 Days  No Growth After 5 Days         Last 24 Hours Medication List:   Current Facility-Administered Medications   Medication Dose Route Frequency Provider Last Rate    acetaminophen  650 mg Oral Q6H PRN Vicky Petty MD      ascorbic acid  1,000 mg Oral Q12H [address] Vicky Petty MD      aspirin  81 mg Oral Daily Vicky Petty MD      atorvastatin  40 mg Oral Daily With Siena Quiles MD      calcitriol  0 5 mcg Oral Once per day on Mon Wed Fri Sophy Buitrago MD      calcium acetate  1,334 mg Oral TID With [address] [facility] [address], MD      carvedilol  6 25 mg Oral BID With Meals Angelia Estrada MD      cholecalciferol  1,000 Units Oral Daily Angelia Estrada MD      dexamethasone  6 mg Intravenous Q24H Zach Coleman MD      heparin (porcine)  5,000 Units Subcutaneous [facility] Azucena Solomon MD      insulin glargine  10 Units Subcutaneous QAJULISA Estrada MD      insulin lispro  1-5 Units Subcutaneous QAM Angelia Estrada MD      insulin lispro  1-6 Units Subcutaneous TID AC Angelia Estrada MD      ondansetron  4 mg Intravenous Once Angelia Estrada MD      ondansetron  4 mg Intravenous Q6H PRN Zach Coleman MD      remdesivir  100 mg Intravenous Q24H Azucena Solomon MD      torsemide  50 mg Oral Daily Angelia Estrada MD      zinc sulfate  220 mg Oral Daily Angelia Estrada MD          Today, Patient Was Seen By: Azucena Solomon MD    ** Please Note: This note has been constructed using a voice recognition system   **

## 2021-01-01 NOTE — PROGRESS NOTES
[facility] NOTE   Donald Ny 61 y o  male MRN: [id number]  Unit/Bed#: S -01 Encounter: [id number]  Reason for Consult: ESRD on HD    ASSESSMENT and PLAN:  1  ESRD on HD [facility]):  · HD planned for tomorrow  2  Access: AVF  3  Hypertension:   · BP range is acceptable  · Continue Carvedilol 6 25 mg BID and Torsemide 50 mg daily  4  Anemia:   · Hgb is close to goal    · On Mircera and Venofer as an outpatient  · Venofer on hold due to high Ferritin from COVID19      5  Mineral and bone disease:   · Restart Calcitriol 0 5 mcg with HD for 2HPT  · Restart Phoslo for hyperphosphatemia  · Continue Vitamin D      6  COVID19 pneumonia:   · Per primary team       7  Hx of CVA 2018       DISPOSITION:  · Next HD is tomorrow  · Restart Calcitriol 0 5 with HD  · Restart Phoslo with meals  The above plan was discussed with Dr Ander Andersen  SUBJECTIVE / 24H INTERVAL HISTORY:  He denies any cough or SOB  However, he was coughing when I was talking to him  Quite anxious and tearful  Wants to go home  OBJECTIVE:  Current Weight: Weight - Scale: 99 8 kg (220 lb)  Vitals:    12/31/20 2015 12/31/20 2100 01/01/21 0300 01/01/21 0700   BP: 159/70 141/65 152/76 144/69   BP Location: Right arm Right arm Right arm Right arm   Pulse: 84 84 79 81   Resp: 20 20 18 18   Temp: 99 9 °F (37 7 °C) 99 4 °F (37 4 °C) 99 1 °F (37 3 °C) 97 6 °F (36 4 °C)   TempSrc: Oral Oral Oral Oral   SpO2: 92% 96% 98% 94%   Weight:       Height:           Intake/Output Summary (Last 24 hours) at 1/1/2021 1030  Last data filed at 1/1/2021 0500  Gross per 24 hour   Intake 0 ml   Output 200 ml   Net -200 ml     The patient was seen and examined through the glass window in order to minimize exposure to COVID19 infection  Spoke to him over the phone  General: conscious, no visible distress  Skin: no visible rash  Eyes: appeared normal externally  ENT: atraumatic     Chest/Lungs: breathing comfortably, talking in full sentences  CVS: could not evaluate  Abdomen: could not evaluate  Extremities: externally normal appearing  : no chávez catheter  Neuro: awake, alert  Psych: very anxious and tearful       Medications:    Current Facility-Administered Medications:     acetaminophen (TYLENOL) tablet 650 mg, 650 mg, Oral, Q6H PRN, Quinton Kuo MD, Stopped at 12/31/20 1212    ascorbic acid (VITAMIN C) tablet 1,000 mg, 1,000 mg, Oral, Q12H [address], Quinton Kuo MD, 1,000 mg at 01/01/21 0859    aspirin (ECOTRIN LOW STRENGTH) EC tablet 81 mg, 81 mg, Oral, Daily, Quinton Kuo MD, 81 mg at 01/01/21 0900    atorvastatin (LIPITOR) tablet 40 mg, 40 mg, Oral, Daily With Payton Velazquez MD, 40 mg at 12/31/20 1812    carvedilol (COREG) tablet 6 25 mg, 6 25 mg, Oral, BID With Meals, Quinton Kuo MD, 6 25 mg at 01/01/21 0859    cholecalciferol (VITAMIN D3) tablet 1,000 Units, 1,000 Units, Oral, Daily, Quinton Kuo MD, 1,000 Units at 01/01/21 0859    dexamethasone (DECADRON) injection 6 mg, 6 mg, Intravenous, Q24H, Franck Gagnon MD, 6 mg at 12/31/20 2358    heparin (porcine) subcutaneous injection 5,000 Units, 5,000 Units, Subcutaneous, Q8H [address], Lucrecia Robbins MD, 5,000 Units at 01/01/21 0541    insulin glargine (LANTUS) subcutaneous injection 10 Units 0 1 mL, 10 Units, Subcutaneous, QAQuinton EGAN MD, 10 Units at 01/01/21 0902    insulin lispro (HumaLOG) 100 units/mL subcutaneous injection 1-5 Units, 1-5 Units, Subcutaneous, QAQuinton EGAN MD, 1 Units at 01/01/21 0901    insulin lispro (HumaLOG) 100 units/mL subcutaneous injection 1-6 Units, 1-6 Units, Subcutaneous, TID AC, 1 Units at 01/01/21 0901 **AND** Fingerstick Glucose (POCT), , , TID AC, Quinton Kuo MD    ondansetron [facility]) injection 4 mg, 4 mg, Intravenous, Once, Quinton Kuo MD, Stopped at 12/30/20 1729    ondansetron [facility]) injection 4 mg, 4 mg, Intravenous, Q6H PRN, Nancy Silva MD, Stopped at 12/31/20 1212    [COMPLETED] remdesivir Jodi Oneida) 200 mg in sodium chloride 0 9 % 250 mL IVPB, 200 mg, Intravenous, Q24H, Stopped at 12/31/20 1337 **FOLLOWED BY** remdesivir (Veklury) 100 mg in sodium chloride 0 9 % 250 mL IVPB, 100 mg, Intravenous, Q24H, Kristal Arzola MD    torsemide (DEMADEX) tablet 50 mg, 50 mg, Oral, Daily, Madiha Williamson MD, 50 mg at 01/01/21 0900    zinc sulfate (ZINCATE) capsule 220 mg, 220 mg, Oral, Daily, Madiha Williamson MD, 220 mg at 01/01/21 0900    Laboratory Results:  Results from last 7 days   Lab Units 01/01/21  0605 12/31/20  0636 12/30/20  2109 12/27/20  0451 12/26/20  1526 12/26/20  1304   WBC Thousand/uL 2 65* 3 09* 3 00* 3 53* 3 98*  --    HEMOGLOBIN g/dL 9 9* 9 6* 10 3* 11 0* 10 8*  --    HEMATOCRIT % 31 7* 30 2* 31 6* 34 0* 33 0*  --    PLATELETS Thousands/uL 99* 103* 103* 112* 115*  112*  --    POTASSIUM mmol/L 4 0 3 7 3 4* 3 9  --  4 1  4 2   CHLORIDE mmol/L 101 102 99* 101  --  97*  99*   CO2 mmol/L 23 27 27 21  --  21  23   BUN mg/dL 60* 42* 34* 91*  --  83*  79*   CREATININE mg/dL 5 92* 5 13* 4 36* 7 56*  --  7 29*  7 43*   CALCIUM mg/dL 8 2* 8 1* 8 5 8 1*  --  8 7  8 5   MAGNESIUM mg/dL  --   --  1 9  --   --   --

## 2021-01-01 NOTE — PLAN OF CARE
Problem: Potential for Falls  Goal: Patient will remain free of falls  Description: INTERVENTIONS:  - Assess patient frequently for physical needs  -  Identify cognitive and physical deficits and behaviors that affect risk of falls    -  [place] fall precautions as indicated by assessment   - Educate patient/family on patient safety including physical limitations  - Instruct patient to call for assistance with activity based on assessment  - Modify environment to reduce risk of injury  - Consider OT/PT consult to assist with strengthening/mobility  Outcome: Progressing     Problem: Prexisting or High Potential for Compromised Skin Integrity  Goal: Skin integrity is maintained or improved  Description: INTERVENTIONS:  - Identify patients at risk for skin breakdown  - Assess and monitor skin integrity  - Assess and monitor nutrition and hydration status  - Monitor labs   - Assess for incontinence   - Turn and reposition patient  - Assist with mobility/ambulation  - Relieve pressure over bony prominences  - Avoid friction and shearing  - Provide appropriate hygiene as needed including keeping skin clean and dry  - Evaluate need for skin moisturizer/barrier cream  - Collaborate with interdisciplinary team   - Patient/family teaching  - Consider wound care consult   Outcome: Progressing

## 2021-01-01 NOTE — PLAN OF CARE
Problem: Potential for Falls  Goal: Patient will remain free of falls  Description: INTERVENTIONS:  - Assess patient frequently for physical needs  -  Identify cognitive and physical deficits and behaviors that affect risk of falls    -  [place] fall precautions as indicated by assessment   - Educate patient/family on patient safety including physical limitations  - Instruct patient to call for assistance with activity based on assessment  - Modify environment to reduce risk of injury  - Consider OT/PT consult to assist with strengthening/mobility  Outcome: Progressing     Problem: Prexisting or High Potential for Compromised Skin Integrity  Goal: Skin integrity is maintained or improved  Description: INTERVENTIONS:  - Identify patients at risk for skin breakdown  - Assess and monitor skin integrity  - Assess and monitor nutrition and hydration status  - Monitor labs   - Assess for incontinence   - Turn and reposition patient  - Assist with mobility/ambulation  - Relieve pressure over bony prominences  - Avoid friction and shearing  - Provide appropriate hygiene as needed including keeping skin clean and dry  - Evaluate need for skin moisturizer/barrier cream  - Collaborate with interdisciplinary team   - Patient/family teaching  - Consider wound care consult   Outcome: Progressing

## 2021-01-01 NOTE — ASSESSMENT & PLAN NOTE
·  Chronic anemia likely from end-stage chronic disease    Currently at baseline  · Leukopenia- likely in the context viral infection  · Chronic Thrombocytopenia  Plan  · Continue Monitoring

## 2021-01-01 NOTE — ASSESSMENT & PLAN NOTE
· On carvedilol, torsemide as home medication  · Blood pressure acceptable        Plan  · Continue home medication and monitor blood pressure

## 2021-01-01 NOTE — PLAN OF CARE
Problem: Potential for Falls  Goal: Patient will remain free of falls  Description: INTERVENTIONS:  - Assess patient frequently for physical needs  -  Identify cognitive and physical deficits and behaviors that affect risk of falls    -  [place] fall precautions as indicated by assessment   - Educate patient/family on patient safety including physical limitations  - Instruct patient to call for assistance with activity based on assessment  - Modify environment to reduce risk of injury  - Consider OT/PT consult to assist with strengthening/mobility  Outcome: Progressing

## 2021-01-02 ENCOUNTER — APPOINTMENT (INPATIENT)
Dept: DIALYSIS | Facility: HOSPITAL | Age: 61
DRG: 177 | End: 2021-01-02
Payer: MEDICARE

## 2021-01-02 VITALS
BODY MASS INDEX: 31.5 KG/M2 | HEIGHT: 70 IN | TEMPERATURE: 98 F | OXYGEN SATURATION: 96 % | SYSTOLIC BLOOD PRESSURE: 133 MMHG | RESPIRATION RATE: 20 BRPM | HEART RATE: 71 BPM | DIASTOLIC BLOOD PRESSURE: 62 MMHG | WEIGHT: 220 LBS

## 2021-01-02 LAB
ANION GAP SERPL CALCULATED.3IONS-SCNC: 12 MMOL/L (ref 4–13)
BASOPHILS # BLD MANUAL: 0 THOUSAND/UL (ref 0–0.1)
BASOPHILS NFR MAR MANUAL: 0 % (ref 0–1)
BUN SERPL-MCNC: 78 MG/DL (ref 5–25)
CALCIUM SERPL-MCNC: 8.3 MG/DL (ref 8.3–10.1)
CHLORIDE SERPL-SCNC: 103 MMOL/L (ref 100–108)
CO2 SERPL-SCNC: 19 MMOL/L (ref 21–32)
CREAT SERPL-MCNC: 5.85 MG/DL (ref 0.6–1.3)
CRP SERPL QL: 75.8 MG/L
D DIMER PPP FEU-MCNC: 1.39 UG/ML FEU
EOSINOPHIL # BLD MANUAL: 0 THOUSAND/UL (ref 0–0.4)
EOSINOPHIL NFR BLD MANUAL: 0 % (ref 0–6)
ERYTHROCYTE [DISTWIDTH] IN BLOOD BY AUTOMATED COUNT: 14.1 % (ref 11.6–15.1)
FERRITIN SERPL-MCNC: 1016 NG/ML (ref 8–388)
GFR SERPL CREATININE-BSD FRML MDRD: 10 ML/MIN/1.73SQ M
GLUCOSE SERPL-MCNC: 183 MG/DL (ref 65–140)
GLUCOSE SERPL-MCNC: 207 MG/DL (ref 65–140)
GLUCOSE SERPL-MCNC: 210 MG/DL (ref 65–140)
HCT VFR BLD AUTO: 30.7 % (ref 36.5–49.3)
HGB BLD-MCNC: 9.8 G/DL (ref 12–17)
LYMPHOCYTES # BLD AUTO: 0.48 THOUSAND/UL (ref 0.6–4.47)
LYMPHOCYTES # BLD AUTO: 20 % (ref 14–44)
MCH RBC QN AUTO: 29.2 PG (ref 26.8–34.3)
MCHC RBC AUTO-ENTMCNC: 31.9 G/DL (ref 31.4–37.4)
MCV RBC AUTO: 91 FL (ref 82–98)
MONOCYTES # BLD AUTO: 0.19 THOUSAND/UL (ref 0–1.22)
MONOCYTES NFR BLD: 8 % (ref 4–12)
MYELOCYTES NFR BLD MANUAL: 3 % (ref 0–1)
NEUTROPHILS # BLD MANUAL: 1.63 THOUSAND/UL (ref 1.85–7.62)
NEUTS BAND NFR BLD MANUAL: 8 % (ref 0–8)
NEUTS SEG NFR BLD AUTO: 60 % (ref 43–75)
NRBC BLD AUTO-RTO: 0 /100 WBCS
PHOSPHATE SERPL-MCNC: 5.9 MG/DL (ref 2.3–4.1)
PLATELET # BLD AUTO: 114 THOUSANDS/UL (ref 149–390)
PLATELET BLD QL SMEAR: ABNORMAL
PMV BLD AUTO: 10.9 FL (ref 8.9–12.7)
POTASSIUM SERPL-SCNC: 4 MMOL/L (ref 3.5–5.3)
RBC # BLD AUTO: 3.36 MILLION/UL (ref 3.88–5.62)
SODIUM SERPL-SCNC: 134 MMOL/L (ref 136–145)
TOTAL CELLS COUNTED SPEC: 100
VARIANT LYMPHS # BLD AUTO: 1 %
WBC # BLD AUTO: 2.39 THOUSAND/UL (ref 4.31–10.16)

## 2021-01-02 PROCEDURE — 80048 BASIC METABOLIC PNL TOTAL CA: CPT | Performed by: INTERNAL MEDICINE

## 2021-01-02 PROCEDURE — 5A1D70Z PERFORMANCE OF URINARY FILTRATION, INTERMITTENT, LESS THAN 6 HOURS PER DAY: ICD-10-PCS | Performed by: INTERNAL MEDICINE

## 2021-01-02 PROCEDURE — 85027 COMPLETE CBC AUTOMATED: CPT | Performed by: INTERNAL MEDICINE

## 2021-01-02 PROCEDURE — 82728 ASSAY OF FERRITIN: CPT | Performed by: INTERNAL MEDICINE

## 2021-01-02 PROCEDURE — 82948 REAGENT STRIP/BLOOD GLUCOSE: CPT

## 2021-01-02 PROCEDURE — 85007 BL SMEAR W/DIFF WBC COUNT: CPT | Performed by: INTERNAL MEDICINE

## 2021-01-02 PROCEDURE — 85379 FIBRIN DEGRADATION QUANT: CPT | Performed by: INTERNAL MEDICINE

## 2021-01-02 PROCEDURE — 90935 HEMODIALYSIS ONE EVALUATION: CPT | Performed by: INTERNAL MEDICINE

## 2021-01-02 PROCEDURE — 84100 ASSAY OF PHOSPHORUS: CPT | Performed by: INTERNAL MEDICINE

## 2021-01-02 PROCEDURE — 99239 HOSP IP/OBS DSCHRG MGMT >30: CPT | Performed by: INTERNAL MEDICINE

## 2021-01-02 PROCEDURE — 86140 C-REACTIVE PROTEIN: CPT | Performed by: INTERNAL MEDICINE

## 2021-01-02 RX ORDER — PREDNISONE 20 MG/1
40 TABLET ORAL DAILY
Qty: 16 TABLET | Refills: 0 | Status: SHIPPED | OUTPATIENT
Start: 2021-01-02 | End: 2021-01-10

## 2021-01-02 RX ADMIN — INSULIN LISPRO 1 UNITS: 100 INJECTION, SOLUTION INTRAVENOUS; SUBCUTANEOUS at 12:32

## 2021-01-02 RX ADMIN — CALCIUM ACETATE 1334 MG: 667 CAPSULE ORAL at 08:00

## 2021-01-02 RX ADMIN — INSULIN LISPRO 1 UNITS: 100 INJECTION, SOLUTION INTRAVENOUS; SUBCUTANEOUS at 09:30

## 2021-01-02 RX ADMIN — CARVEDILOL 6.25 MG: 6.25 TABLET, FILM COATED ORAL at 08:00

## 2021-01-02 RX ADMIN — ASPIRIN 81 MG: 81 TABLET ORAL at 11:00

## 2021-01-02 RX ADMIN — ZINC SULFATE 220 MG (50 MG) CAPSULE 220 MG: CAPSULE at 11:00

## 2021-01-02 RX ADMIN — OXYCODONE HYDROCHLORIDE AND ACETAMINOPHEN 1000 MG: 500 TABLET ORAL at 11:00

## 2021-01-02 RX ADMIN — REMDESIVIR 100 MG: 100 INJECTION, POWDER, LYOPHILIZED, FOR SOLUTION INTRAVENOUS at 12:20

## 2021-01-02 RX ADMIN — TORSEMIDE 50 MG: 20 TABLET ORAL at 11:00

## 2021-01-02 RX ADMIN — INSULIN GLARGINE 10 UNITS: 100 INJECTION, SOLUTION SUBCUTANEOUS at 09:30

## 2021-01-02 RX ADMIN — Medication 1000 UNITS: at 11:00

## 2021-01-02 RX ADMIN — HEPARIN SODIUM 5000 UNITS: 5000 INJECTION INTRAVENOUS; SUBCUTANEOUS at 13:27

## 2021-01-02 RX ADMIN — INSULIN LISPRO 2 UNITS: 100 INJECTION, SOLUTION INTRAVENOUS; SUBCUTANEOUS at 08:30

## 2021-01-02 RX ADMIN — HEPARIN SODIUM 5000 UNITS: 5000 INJECTION INTRAVENOUS; SUBCUTANEOUS at 05:11

## 2021-01-02 RX ADMIN — CALCIUM ACETATE 1334 MG: 667 CAPSULE ORAL at 12:19

## 2021-01-02 NOTE — DISCHARGE INSTRUCTIONS
[address]    Your healthcare provider and/or public health staff have evaluated you and have determined that you do not need to remain in the hospital at this time  At this time you can be isolated at home where you will be monitored by staff from your local or state health department  You should carefully follow the prevention and isolation steps below until a healthcare provider or local or state health department says that you can return to your normal activities  Stay home except to get medical care    People who are mildly ill with COVID-19 are able to isolate at home during their illness  You should restrict activities outside your home, except for getting medical care  Do not go to work, school, or public areas  Avoid using public transportation, ride-sharing, or taxis  Separate yourself from other people and animals in your home    People: As much as possible, you should stay in a specific room and away from other people in your home  Also, you should use a separate bathroom, if available  Animals: You should restrict contact with pets and other animals while you are sick with COVID-19, just like you would around other people  Although there have not been reports of pets or other animals becoming sick with COVID-19, it is still recommended that people sick with COVID-19 limit contact with animals until more information is known about the virus  When possible, have another member of your household care for your animals while you are sick  If you are sick with COVID-19, avoid contact with your pet, including petting, snuggling, being kissed or licked, and sharing food  If you must care for your pet or be around animals while you are sick, wash your hands before and after you interact with pets and wear a facemask  See COVID-19 and Animals for more information      Call ahead before visiting your doctor    If you have a medical appointment, call the healthcare provider and tell them that you have or may have COVID-19  This will help the healthcare providers office take steps to keep other people from getting infected or exposed  Wear a facemask    You should wear a facemask when you are around other people (e g , sharing a room or vehicle) or pets and before you enter a healthcare providers office  If you are not able to wear a facemask (for example, because it causes trouble breathing), then people who live with you should not stay in the same room with you, or they should wear a facemask if they enter your room  Cover your coughs and sneezes    Cover your mouth and nose with a tissue when you cough or sneeze  Throw used tissues in a lined trash can  Immediately wash your hands with soap and water for at least 20 seconds or, if soap and water are not available, clean your hands with an alcohol-based hand  that contains at least 60% alcohol  Clean your hands often    Wash your hands often with soap and water for at least 20 seconds, especially after blowing your nose, coughing, or sneezing; going to the bathroom; and before eating or preparing food  If soap and water are not readily available, use an alcohol-based hand  with at least 60% alcohol, covering all surfaces of your hands and rubbing them together until they feel dry  Soap and water are the best option if hands are visibly dirty  Avoid touching your eyes, nose, and mouth with unwashed hands  Avoid sharing personal household items    You should not share dishes, drinking glasses, cups, eating utensils, towels, or bedding with other people or pets in your home  After using these items, they should be washed thoroughly with soap and water  Clean all high-touch surfaces everyday    High touch surfaces include counters, tabletops, doorknobs, bathroom fixtures, toilets, phones, keyboards, tablets, and bedside tables  Also, clean any surfaces that may have blood, stool, or body fluids on them   Use a household cleaning spray or wipe, according to the label instructions  Labels contain instructions for safe and effective use of the cleaning product including precautions you should take when applying the product, such as wearing gloves and making sure you have good ventilation during use of the product  Monitor your symptoms    Seek prompt medical attention if your illness is worsening (e g , difficulty breathing)  Before seeking care, call your healthcare provider and tell them that you have, or are being evaluated for, COVID-19  Put on a facemask before you enter the facility  These steps will help the healthcare providers office to keep other people in the office or waiting room from getting infected or exposed  Ask your healthcare provider to call the local or [facility] health department  Persons who are placed under active monitoring or facilitated self-monitoring should follow instructions provided by their local health department or occupational health professionals, as appropriate  If you have a medical emergency and need to call 911, notify the dispatch personnel that you have, or are being evaluated for COVID-19  If possible, put on a facemask before emergency medical services arrive      Discontinuing home isolation    Patients with confirmed COVID-19 should remain under home isolation precautions until the following conditions are met:   - They have had no fever for at least 24 hours (that is one full day of no fever without the use medicine that reduces fevers)  AND  - other symptoms have improved (for example, when their cough or shortness of breath have improved)  AND  - If had mild or moderate illness, at least 10 days have passed since their symptoms first appeared or if severe illness (needed oxygen) or immunosuppressed, at least 20 days have passed since symptoms first appeared  Patients with confirmed COVID-19 should also notify close contacts (including their workplace) and ask that they self-quarantine  Currently, close contact is defined as being within 6 feet for 15 minutes or more from the period 24 hours starting 48 hours before symptom onset to the time at which the patient went into isolation  Close contacts of patients diagnosed with COVID-19 should be instructed by the patient to self-quarantine for 14 days from the last time of their last contact with the patient       Source: [employer]

## 2021-01-02 NOTE — PLAN OF CARE
Post-Dialysis RN Treatment Note    Blood Pressure:  Pre: 118/79 mm/Hg  Post: 133/62 mmHg   EDW: 102 kg    Weight:  Pre: 102 5 kg   Post: 101 0 kg   Mode of weight measurement: Bed Scale   Volume Removed  1500 ml    Treatment duration: 180 minutes    NS given  No    Treatment shortened? No   Medications given during Rx None Reported   Estimated Kt/V  Not Applicable   Access type: AV fistula   Access Status: Yes, describe: Ordered BFR maintained     Report called to primary nurse   Yes, Gregg Zimmer    Patient infilltrated venous needle with constant movement, patient re-cannulated and treatment resumed      Problem: METABOLIC, FLUID AND ELECTROLYTES - ADULT  Goal: Electrolytes maintained within normal limits  Description: INTERVENTIONS:  - Monitor labs and assess patient for signs and symptoms of electrolyte imbalances  - Administer electrolyte replacement as ordered  - Monitor response to electrolyte replacements, including repeat lab results as appropriate  - Instruct patient on fluid and nutrition as appropriate  Outcome: Progressing  Goal: Fluid balance maintained  Description: INTERVENTIONS:  - Monitor labs   - Monitor I/O and WT  - Instruct patient on fluid and nutrition as appropriate  - Assess for signs & symptoms of volume excess or deficit  Outcome: Progressing     Problem: HEMATOLOGIC - ADULT  Goal: Maintains hematologic stability  Description: INTERVENTIONS  - Assess for signs and symptoms of bleeding or hemorrhage  - Monitor labs  - Administer supportive blood products/factors as ordered and appropriate  Outcome: Progressing

## 2021-01-02 NOTE — DISCHARGE SUMMARY
Discharge Summary - [address] [facility]    Patient Information: Natalee Escobedo 61 y o  male MRN: [id number]  Unit/Bed#: S -01 Encounter: [id number]    Discharging Physician / Practitioner: Moreno Mendosa MD  PCP: Trinity Gillespie DO  Admission Date: 12/30/2020  Discharge Date: 01/02/21    Reason for Admission:  COVID 19 pneumonia    Discharge Diagnoses:     Principal Problem:    COVID-19 virus infection  Active Problems:    Type 2 diabetes mellitus ([facility] )    History of stroke    Hypertension    ESRD on dialysis ([facility] )    Pancytopenia ([place] [id number] )      Consultations During Hospital Stay:  · Nephrology    Procedures Performed:   · None    Significant findings:  · Chest x-ray - Worsening bilateral groundglass opacity, greater on the right, compatible with Covid-19 pneumonia  Hospital Course:   Natalee Escobedo is a 61 y o  male patient who originally presented to the hospital on 12/30/2020 due to COVID-19 virus pneumonia  The patient had recently been discharged from the hospital following admission for mild illness  He presented secondary to worsening symptoms  A repeat chest x-ray showed worsening pulmonary infiltrates  He transiently required supplemental O2 up to 3 L but was shortly thereafter transitioned to room air with sats in the high 90s  He remained in stable condition respiratory standpoint  Was started on IV remdesivir and received total 3 doses prior to discharge  He was also started on IV Decadron and will continue on p o  Prednisone after discharge to complete total 10 days steroid course  In view of mild disease with low O2 needs, there was no indication for patient to be discharged on anticoagulation  He remained in stable condition and was cleared for discharge home on 1/2/21  Condition at Discharge: stable     Discharge Day Visit / Exam:     Subjective:  Patient feels much better this morning  He remains eager for discharge home today    He is on room air with no distress noted on my exam   Patient was remotely evaluated  He was about to begin hemodialysis at the time of my evaluation  Vitals: Blood Pressure: 133/62 (01/02/21 1145)  Pulse: 71 (01/02/21 1145)  Temperature: 98 °F (36 7 °C) (01/02/21 1145)  Temp Source: Oral (01/02/21 1145)  Respirations: 20 (01/02/21 1145)  Height: 5' 10" (177 8 cm) (12/30/20 2102)  Weight - Scale: 99 8 kg (220 lb) (12/30/20 2102)  SpO2: 96 % (01/02/21 0700)    General Appearance:    Alert, cooperative, no distress, appropriately responsive, able to speak in full sentences    Head:    Normocephalic   Neck:   Supple   Resp:     Respirations nonlabored, no accessory muscle use   Abdomen:     Nondistended   Extremities:   Moves all extremities   Neurologic:  Alert and oriented x3, follows commands  Speech is clear, no facial droop or asymmetry      Discharge instructions/Information to patient and family:   See after visit summary for information provided to patient and family  Provisions for Follow-Up Care:  See after visit summary for information related to follow-up care and any pertinent home health orders  Disposition: Home    Patient's son Rollin Cowden was updated on phone  All questions answered  Discharge Statement:  I spent >30 minutes discharging the patient  This time was spent on the day of discharge  I had direct contact with the patient on the day of discharge  Greater than 50% of the total time was spent examining patient, answering all patient questions, arranging and discussing plan of care with patient as well as directly providing post-discharge instructions  Additional time then spent on discharge activities  Discharge Medications:  See after visit summary for reconciled discharge medications provided to patient and family  ** Please Note: Dragon 360 Dictation voice to text software may have been used in the creation of this document   **

## 2021-01-02 NOTE — DISCHARGE INSTR - AVS FIRST PAGE
Dear Joce Dixon,     It was our pleasure to care for you here at [facility]  It is our hope that we were always able to exceed the expected standards for your care during your stay  You were hospitalized due to COVID-19 pneumonia  You were cared for on the [facility] 3rd floor under the service of Sherrill Hull MD with the [address] [place] [address] Internal Medicine Hospitalist [facility] who covers for your primary care physician (PCP), Chapo Balderrama DO, while you were hospitalized  If you have any questions or concerns related to this hospitalization, you may contact us at [phone]  For follow up as well as medication refills, we recommend that you follow up with your primary care physician  A registered nurse will reach out to you by phone within a few days after your discharge to answer any additional questions that you may have after going home  However, at this time we provide for you here, the most important instructions / recommendations at discharge:       · Notable Medication Adjustments -   · Continue prednisone 40 mg daily for 8 more days    · Testing Required after Discharge -   · None    · Important follow up information -   · Follow-up with your primary care physician within 72 hours of discharge    · Other Instructions -    Practice social distancing   Adequate hand washing hygiene   Wear mask in public at all times    · Please review this entire after visit summary as additional general instructions including medication list, appointments, activity, diet, any pertinent wound care, and other additional recommendations from your care team that may be provided for you        Sincerely,     Sherrill Hull MD

## 2021-01-02 NOTE — NURSING NOTE
Received patient asleep in bed on 3Lnc   Patient  showed no signs of respiratory distress or pain  Assisted patient to stand and urinate  Will continue to monitor for changes

## 2021-01-02 NOTE — PROGRESS NOTES
[address] [facility] NOTE   Renae Schrader 61 y o  male MRN: [id number]  Unit/Bed#: S -01 Encounter: [id number]  Reason for Consult: ESRD on HD    ASSESSMENT and PLAN:  1  ESRD on HD [facility]):  · HD today  2  Access: L arm AVF  No issues  3  Hypertension:   · BP range is acceptable to high  · Continue Carvedilol 6 25 mg BID and Torsemide 50 mg daily  · Challenge EDW by 0 5 kg today  4  Anemia:   · Hgb is close to goal    · On Mircera and Venofer as an outpatient  · Venofer on hold due to high Ferritin from COVID19      5  Mineral and bone disease:   · Continue Calcitriol 0 5 mcg with HD for 2HPT  · Continue Phoslo for hyperphosphatemia  · Continue Vitamin D      6  COVID19 pneumonia:   · Per primary team       7  Hx of CVA 2018       DISPOSITION:  · HD today  · Next HD is Monday, Jan 4  SUBJECTIVE / 24H INTERVAL HISTORY:  Denies any SOB  Wants to go home  HEMODIALYSIS PROCEDURE NOTE  The patient was seen and examined on hemodialysis  Time: 3 hours  Sodium: 138 Blood flow: 300   Dialyzer: F180 Potassium: 3 Dialysate flow: 600   Access: L arm AVF Bicarbonate: 30 Ultrafiltration goal: 1 5 kg   Medications on HD: none     OBJECTIVE:  Current Weight: Weight - Scale: 99 8 kg (220 lb)  Vitals:    01/02/21 1100 01/02/21 1130 01/02/21 1140 01/02/21 1145   BP: 104/55 118/60 126/58 133/62   BP Location: Right arm Right arm Right arm Right arm   Pulse: 71 72 72 71   Resp: 20 20 20 20   Temp:    98 °F (36 7 °C)   TempSrc:    Oral   SpO2:       Weight:       Height:           Intake/Output Summary (Last 24 hours) at 1/2/2021 1205  Last data filed at 1/2/2021 1155  Gross per 24 hour   Intake 1480 ml   Output 3150 ml   Net -1670 ml     The patient was seen and examined through the glass window in order to minimize exposure to COVID19 infection  I spoke to him over the phone  General: conscious, no visible distress     Skin: no visible rash  Eyes: appeared normal externally  ENT: atraumatic  Chest/Lungs: breathing comfortably, talking in full sentences without difficulty  CVS: could not evaluate  Abdomen: could not evaluate  Extremities: externally normal appearing  : no chávez catheter  Neuro: awake, alert  Psych: less anxious today       Medications:    Current Facility-Administered Medications:     acetaminophen (TYLENOL) tablet 650 mg, 650 mg, Oral, Q6H PRN, Angelia Estrada MD, Stopped at 12/31/20 1212    ascorbic acid (VITAMIN C) tablet 1,000 mg, 1,000 mg, Oral, Q12H [address], Angelia Estrada MD, 1,000 mg at 01/01/21 2151    aspirin (ECOTRIN LOW STRENGTH) EC tablet 81 mg, 81 mg, Oral, Daily, Angelia Estrada MD, 81 mg at 01/01/21 0900    atorvastatin (LIPITOR) tablet 40 mg, 40 mg, Oral, Daily With Miesha Pyle MD, 40 mg at 01/01/21 1622    calcitriol (ROCALTROL) capsule 0 5 mcg, 0 5 mcg, Oral, Once per day on Mon Wed Fri, Thomas Uribe MD, 0 5 mcg at 01/01/21 1356    calcium acetate (PHOSLO) capsule 1,334 mg, 1,334 mg, Oral, TID With Meals, Thomas Uribe MD, 1,334 mg at 01/01/21 1622    carvedilol (COREG) tablet 6 25 mg, 6 25 mg, Oral, BID With Meals, Angelia Estrada MD, 6 25 mg at 01/01/21 1622    cholecalciferol (VITAMIN D3) tablet 1,000 Units, 1,000 Units, Oral, Daily, Angelia Estrada MD, 1,000 Units at 01/01/21 0859    dexamethasone (DECADRON) injection 6 mg, 6 mg, Intravenous, Q24H, Zach Coleman MD, 6 mg at 01/01/21 2152    heparin (porcine) subcutaneous injection 5,000 Units, 5,000 Units, Subcutaneous, Q8H [address], Azucena Solomon MD, 5,000 Units at 01/02/21 0511    insulin glargine (LANTUS) subcutaneous injection 10 Units 0 1 mL, 10 Units, Subcutaneous, QAM, Angelia Estrada MD, 10 Units at 01/01/21 0902    insulin lispro (HumaLOG) 100 units/mL subcutaneous injection 1-5 Units, 1-5 Units, Subcutaneous, Angelia MOORE MD, 1 Units at 01/01/21 0901    insulin lispro (HumaLOG) 100 units/mL subcutaneous injection 1-6 Units, 1-6 Units, Subcutaneous, TID AC, 2 Units at 01/01/21 1625 **AND** Fingerstick Glucose (POCT), , , TID AC, Alannah Harvey MD    melatonin tablet 3 mg, 3 mg, Oral, HS, Williams Zimmer PA-C, 3 mg at 01/01/21 2151    ondansetron (ZOFRAN) injection 4 mg, 4 mg, Intravenous, Once, Alannah Harvey MD, Stopped at 12/30/20 2355    ondansetron [facility]) injection 4 mg, 4 mg, Intravenous, Q6H PRN, Janene Krishnamurthy MD, Stopped at 12/31/20 1212    [COMPLETED] remdesivir (Veklury) 200 mg in sodium chloride 0 9 % 250 mL IVPB, 200 mg, Intravenous, Q24H, Stopped at 12/31/20 1337 **FOLLOWED BY** remdesivir (Veklury) 100 mg in sodium chloride 0 9 % 250 mL IVPB, 100 mg, Intravenous, Q24H, Mandi Magallanes MD, 100 mg at 01/01/21 1332    torsemide (DEMADEX) tablet 50 mg, 50 mg, Oral, Daily, Alannah Harvey MD, 50 mg at 01/01/21 0900    zinc sulfate (ZINCATE) capsule 220 mg, 220 mg, Oral, Daily, Alannah Harvey MD, 220 mg at 01/01/21 0900    Laboratory Results:  Results from last 7 days   Lab Units 01/02/21  1043 01/02/21  0612 01/01/21  0605 12/31/20  0636 12/30/20  2109 12/27/20  0451 12/26/20  1526 12/26/20  1304   WBC Thousand/uL 2 39*  --  2 65* 3 09* 3 00* 3 53* 3 98*  --    HEMOGLOBIN g/dL 9 8*  --  9 9* 9 6* 10 3* 11 0* 10 8*  --    HEMATOCRIT % 30 7*  --  31 7* 30 2* 31 6* 34 0* 33 0*  --    PLATELETS Thousands/uL 114*  --  99* 103* 103* 112* 115*  112*  --    POTASSIUM mmol/L  --  4 0 4 0 3 7 3 4* 3 9  --  4 1  4 2   CHLORIDE mmol/L  --  103 101 102 99* 101  --  97*  99*   CO2 mmol/L  --  19* 23 27 27 21  --  21  23   BUN mg/dL  --  78* 60* 42* 34* 91*  --  83*  79*   CREATININE mg/dL  --  5 85* 5 92* 5 13* 4 36* 7 56*  --  7 29*  7 43*   CALCIUM mg/dL  --  8 3 8 2* 8 1* 8 5 8 1*  --  8 7  8 5   MAGNESIUM mg/dL  --   --   --   --  1 9  --   --   --    PHOSPHORUS mg/dL  --  5 9*  --   --   --   --   --   --

## 2021-01-03 LAB
ABO GROUP BLD BPU: NORMAL
BPU ID: NORMAL
UNIT DISPENSE STATUS: NORMAL
UNIT PRODUCT CODE: NORMAL
UNIT RH: NORMAL

## 2021-01-04 ENCOUNTER — TRANSITIONAL CARE MANAGEMENT (OUTPATIENT)
Dept: INTERNAL MEDICINE CLINIC | Facility: CLINIC | Age: 61
End: 2021-01-04

## 2021-01-04 LAB
G6PD BLD QN: 301 U/10E12 RBC (ref 127–427)
RBC # BLD AUTO: 2.86 X10E6/UL (ref 4.14–5.8)

## 2021-01-05 LAB
BACTERIA BLD CULT: NORMAL
BACTERIA BLD CULT: NORMAL

## 2021-01-05 NOTE — UTILIZATION REVIEW
Notification of Discharge  This is a Notification of Discharge from our facility [address]  Please be advised that this patient has been discharge from our facility  Below you will find the admission and discharge date and time including the patients disposition  PRESENTATION DATE: 12/30/2020  9:22 PM  OBS ADMISSION DATE:   IP ADMISSION DATE: 12/31/20 1609   DISCHARGE DATE: 1/2/2021  3:30 PM  DISPOSITION: Home/Self Care Home/Self Care   Admission Orders listed below:  Admission Orders (From admission, onward)     Ordered        12/31/20 1609  Inpatient Admission  Once         12/31/20 0102  Place in Observation  Once                   Please contact the UR Department if additional information is required to close this patient's authorization/case  [address] [facility] Utilization Review Department  Main: [phone] x carefully listen to the prompts  All voicemails are confidential   [email or web address]  Send all requests for admission clinical reviews, approved or denied determinations and any other requests to dedicated fax number below belonging to the campus where the patient is receiving treatment   List of dedicated fax numbers:  [address] DENIALS (Administrative/Medical Necessity) [phone]   [address] (Maternity/NICU/Pediatrics) [phone]   [place] [phone]   [facility] [phone]   Wojciech Farley [phone]   [facility] [address] [place] [address] [phone]   [facility]  [phone]   [address] [facility], [address]  [address] [facility] [address] [facility] [phone]

## 2021-01-06 ENCOUNTER — APPOINTMENT (OUTPATIENT)
Dept: RADIOLOGY | Age: 61
End: 2021-01-06
Payer: MEDICARE

## 2021-01-06 ENCOUNTER — TELEPHONE (OUTPATIENT)
Dept: INTERNAL MEDICINE CLINIC | Facility: CLINIC | Age: 61
End: 2021-01-06

## 2021-01-06 ENCOUNTER — OFFICE VISIT (OUTPATIENT)
Dept: URGENT CARE | Age: 61
End: 2021-01-06
Payer: MEDICARE

## 2021-01-06 VITALS
HEIGHT: 68 IN | RESPIRATION RATE: 20 BRPM | TEMPERATURE: 96.5 F | BODY MASS INDEX: 33.45 KG/M2 | HEART RATE: 49 BPM | OXYGEN SATURATION: 100 %

## 2021-01-06 DIAGNOSIS — R06.02 SOB (SHORTNESS OF BREATH): Primary | ICD-10-CM

## 2021-01-06 DIAGNOSIS — B37.2 SKIN YEAST INFECTION: ICD-10-CM

## 2021-01-06 DIAGNOSIS — R06.02 SOB (SHORTNESS OF BREATH): ICD-10-CM

## 2021-01-06 LAB — IL6 SERPL-MCNC: 86.8 PG/ML (ref 0–13)

## 2021-01-06 PROCEDURE — G0463 HOSPITAL OUTPT CLINIC VISIT: HCPCS | Performed by: PHYSICIAN ASSISTANT

## 2021-01-06 PROCEDURE — 99213 OFFICE O/P EST LOW 20 MIN: CPT | Performed by: PHYSICIAN ASSISTANT

## 2021-01-06 PROCEDURE — 71046 X-RAY EXAM CHEST 2 VIEWS: CPT

## 2021-01-06 RX ORDER — NYSTATIN 100000 [USP'U]/G
POWDER TOPICAL 2 TIMES DAILY
Qty: 15 G | Refills: 0 | Status: SHIPPED | OUTPATIENT
Start: 2021-01-06 | End: 2022-01-03 | Stop reason: HOSPADM

## 2021-01-06 NOTE — TELEPHONE ENCOUNTER
Pt has a red and itchy rash on his testicles and its very painful for the past three days  Pt tested positive for Covid on 12/26 but is no longer exhibiting any symptoms   Pt was referred to the respiratory clinic at [facility] for an appt at 12:30pm

## 2021-01-06 NOTE — PROGRESS NOTES
[facility]        NAME: Jerome Dixon is a 61 y o  male  : 1960    MRN: [id number]  DATE: 2021  TIME: 1:55 PM    Assessment and Plan   SOB (shortness of breath) [R06 02]  1  SOB (shortness of breath)  XR chest pa & lateral    CANCELED: XR chest pa & lateral   2  Skin yeast infection  nystatin (MYCOSTATIN) powder         Patient Instructions       Follow up with PCP in 3-5 days  Proceed to  ER if symptoms worsen  Chief Complaint     Chief Complaint   Patient presents with    COVID-19     burning and itch    resp clinic    History of Present Illness       The patient is currently COVID positive  He assisted in the hospital few days ago for COVID pneumonia  He was on medications at the hospital and now developed an itchy rash on his testicles  His primary care doctor sent him here to evaluate the rash  He also still having some shortness of breath  Review of Systems   Review of Systems   Constitutional: Negative  HENT: Negative  Respiratory: Positive for shortness of breath  Negative for cough and wheezing  Cardiovascular: Negative  Gastrointestinal: Negative  Genitourinary: Negative  Musculoskeletal: Negative  Skin: Positive for rash  Psychiatric/Behavioral: Negative  Current Medications       Current Outpatient Medications:     aspirin (ECOTRIN LOW STRENGTH) 81 mg EC tablet, Take 81 mg by mouth daily Resume on , Disp: , Rfl:     atorvastatin (LIPITOR) 80 mg tablet, TAKE 0 5 TABLETS (40 MG TOTAL) BY MOUTH DAILY WITH DINNER, Disp: 45 tablet, Rfl: 1    Blood Glucose Monitoring Suppl (TRUE METRIX METER) w/Device KIT, Use to test blood sugars 3 times daily, Disp: 1 kit, Rfl: 0    Blood Pressure Monitoring (BLOOD PRESSURE CUFF) MISC, Use to check blood pressure before taking blood pressure medication and 1 hour after and follow instructions provided in discharge instructions based on the readings  , Disp: 1 each, Rfl: 0   carvedilol (COREG) 6 25 mg tablet, Take 1 tablet (6 25 mg total) by mouth 2 (two) times a day with meals, Disp: 180 tablet, Rfl: 0    Cholecalciferol (Vitamin D3) 1 25 MG (32198 UT) CAPS, TAKE 1 CAPSULE BY MOUTH ONE TIME PER WEEK, Disp: 12 capsule, Rfl: 2    Continuous Blood Gluc  (DEXCOM G6 ) LANCE, Use as directed for continuous glucose monitoring, Disp: 1 Device, Rfl: 0    Continuous Blood Gluc Sensor (DEXCOM G6 SENSOR) MISC, Use as directed for continuous glucose monitoring   Change every 10 days, Disp: 1 each, Rfl: 11    Continuous Blood Gluc Transmit (DEXCOM G6 TRANSMITTER) MISC, Use as directed for continuous glucose monitoring-Change every 3 months, Disp: 1 each, Rfl: 3    Incontinence Supplies (MALE URINAL) MISC, by Does not apply route daily, Disp: 6 each, Rfl: 3    insulin glargine (Basaglar KwikPen) 100 units/mL injection pen, Inject 10 Units under the skin daily, Disp: , Rfl:     Insulin Pen Needle 32G X 4 MM MISC, USE TO INJECT INSULIN 4 TIMES DAILY, Disp: 120 each, Rfl: 1    Insulin Syringe-Needle U-100 (B-D INS SYRINGE 0 5CC/30GX1/2") 30G X 1/2" 0 5 ML MISC, Inject under the skin 4 (four) times a day, Disp: 360 each, Rfl: 1    ONETOUCH DELICA LANCETS 10K MISC, by Does not apply route 3 (three) times a day, Disp: 270 each, Rfl: 1    ascorbic acid (VITAMIN C) 1000 MG tablet, Take 1 tablet (1,000 mg total) by mouth every 12 (twelve) hours for 13 doses, Disp: 13 tablet, Rfl: 0    calcium acetate (PHOSLO) capsule, Take 1 capsule (667 mg total) by mouth 3 (three) times a day with meals, Disp: 90 capsule, Rfl: 0    nystatin (MYCOSTATIN) powder, Apply topically 2 (two) times a day for 10 days, Disp: 15 g, Rfl: 0    predniSONE 20 mg tablet, Take 2 tablets (40 mg total) by mouth daily for 8 days, Disp: 16 tablet, Rfl: 0    torsemide (DEMADEX) 10 mg tablet, Take 5 tablets (50 mg total) by mouth daily, Disp: 150 tablet, Rfl: 0    zinc sulfate (ZINCATE) 220 mg capsule, Take 1 capsule (220 mg total) by mouth daily for 6 doses, Disp: 6 capsule, Rfl: 0    Current Allergies     Allergies as of 01/06/2021    (No Known Allergies)            The following portions of the patient's history were reviewed and updated as appropriate: allergies, current medications, past family history, past medical history, past social history, past surgical history and problem list      Past Medical History:   Diagnosis Date    Chronic kidney disease     Diabetes mellitus ([facility] )     GERD (gastroesophageal reflux disease)     Hypercholesteremia     Hyperlipidemia     Hypertension     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    Neuropathy     Obesity     Osteomyelitis ([facility] )     last assessed 11/4/16    PVC's (premature ventricular contractions)     sees cardiology Dr Ponce camargo    Stroke [facility])     last weeof July 2018 [address] [facility] [facility]       Past Surgical History:   Procedure Laterality Date    ABDOMINAL SURGERY      CHOLECYSTECTOMY      Percutaneous    COLONOSCOPY      CYSTOSCOPY      OTHER SURGICAL HISTORY      "stimulator to control bowel movements"    RI ESOPHAGOGASTRODUODENOSCOPY TRANSORAL DIAGNOSTIC N/A 9/27/2016    Procedure: ESOPHAGOGASTRODUODENOSCOPY (EGD); Surgeon: Wolfgang Cardenas MD;  Location: [facility] GI LAB;   Service: Gastroenterology    RI LAP,CHOLECYSTECTOMY N/A 2/29/2016    Procedure: LAPAROSCOPIC CHOLECYSTECTOMY ;  Surgeon: Eduardo Avendano DO;  Location: [facility] Main OR;  Service: General    ROTATOR CUFF REPAIR Right     TOE AMPUTATION Right 10/28/2016    Procedure: 3RD TOE AMPUTATION ;  Surgeon: Dee Cody DPM;  Location: [facility] Main OR;  Service:        Family History   Problem Relation Age of Onset    Leukemia Mother     Liver disease Mother     Lung cancer Mother         heavy smoker - 3 ppd    Heart disease Father     Liver disease Father     Multiple myeloma Sister     Breast cancer Sister     Urolithiasis Family     Alcohol abuse Neg Hx     Depression Neg Hx  Drug abuse Neg Hx     Substance Abuse Neg Hx     Mental illness Neg Hx          Medications have been verified  Objective   Pulse (!) 49   Temp (!) 96 5 °F (35 8 °C)   Resp 20   Ht 5' 8" (1 727 m)   SpO2 100%   BMI 33 45 kg/m²        Physical Exam     Physical Exam  Vitals signs and nursing note reviewed  Constitutional:       General: He is not in acute distress  Appearance: Normal appearance  He is not ill-appearing, toxic-appearing or diaphoretic  HENT:      Head: Atraumatic  Cardiovascular:      Rate and Rhythm: Normal rate  Pulses: Normal pulses  Heart sounds: Normal heart sounds  Pulmonary:      Effort: Pulmonary effort is normal       Breath sounds: Normal breath sounds  No wheezing, rhonchi or rales  Genitourinary:     Penis: Normal        Scrotum/Testes: Normal       Comments: Mild erythema on testicles  Skin:     General: Skin is warm and dry  Neurological:      General: No focal deficit present  Mental Status: He is alert and oriented to person, place, and time  Mental status is at baseline     Psychiatric:         Mood and Affect: Mood normal          Behavior: Behavior normal

## 2021-01-07 ENCOUNTER — TELEMEDICINE (OUTPATIENT)
Dept: INTERNAL MEDICINE CLINIC | Facility: CLINIC | Age: 61
End: 2021-01-07
Payer: MEDICARE

## 2021-01-07 DIAGNOSIS — Z99.2 ESRD ON DIALYSIS (HCC): ICD-10-CM

## 2021-01-07 DIAGNOSIS — N18.6 ESRD ON DIALYSIS (HCC): ICD-10-CM

## 2021-01-07 DIAGNOSIS — Z86.16 HISTORY OF 2019 NOVEL CORONAVIRUS DISEASE (COVID-19): Primary | ICD-10-CM

## 2021-01-07 PROCEDURE — 1036F TOBACCO NON-USER: CPT | Performed by: INTERNAL MEDICINE

## 2021-01-07 PROCEDURE — 3066F NEPHROPATHY DOC TX: CPT | Performed by: INTERNAL MEDICINE

## 2021-01-07 PROCEDURE — 99213 OFFICE O/P EST LOW 20 MIN: CPT | Performed by: INTERNAL MEDICINE

## 2021-01-07 PROCEDURE — 1111F DSCHRG MED/CURRENT MED MERGE: CPT | Performed by: INTERNAL MEDICINE

## 2021-01-07 NOTE — PROGRESS NOTES
COVID-19 Virtual Visit     Assessment/Plan:    Problem List Items Addressed This Visit     ESRD on dialysis [facility])    Relevant Orders    Novel Coronavirus (COVID-19), PCR [facility] - Collected at [place] [address] or [facility]    History of 2019 novel coronavirus disease (COVID-19) - Primary    Relevant Orders    Novel Coronavirus (COVID-19), PCR [facility] - Collected at [place] [address] or [facility]         Disposition:     I recommended continued isolation until at least 24 hours have passed since recovery defined as resolution of fever without the use of fever-reducing medications AND improvement in COVID symptoms AND 10 days have passed since onset of symptoms (or 10 days have passed since date of first positive viral diagnostic test for asymptomatic patients)  Jelani Sanchez is better from COVID-19 infection, he no longer has symptoms and is more than 10 days in isolation  He is on dialysis for ESRD and needs to be tested twice in 7 days for COVID-19 and both negative before he can return to his home dialysis center  Called and spoke to Joseph Caicedo at dialysis center who concurred that this is [facility]  Test ordered and my office will reach out to the patient to notify  I have spent 15 minutes directly with the patient  Greater than 50% of this time was spent in counseling/coordination of care regarding: instructions for management, patient and family education and impressions          Encounter provider Pedro Cook DO    Provider located at [address] [place] [address]  [address]  [phone]    Recent Visits  Date Type Provider Dept   01/06/21 Telephone [facility]   Showing recent visits within past 7 days and meeting all other requirements     Today's Visits  Date Type Provider Dept   01/07/21 Telemedicine [facility]   Showing today's visits and meeting all other requirements     Future Appointments  No visits were found meeting these conditions  Showing future appointments within next 150 days and meeting all other requirements        Patient agrees to participate in a virtual check in via telephone or video visit instead of presenting to the office to address urgent/immediate medical needs  Patient is aware this is a billable service  After connecting through Telephone, the patient was identified by name and date of birth  Kellie Herrera was informed that this was a telemedicine visit and that the exam was being conducted confidentially over secure lines  My office door was closed  No one else was in the room  Kellie Herrera acknowledged consent and understanding of privacy and security of the telemedicine visit  I informed the patient that I have reviewed his record in Epic and presented the opportunity for him to ask any questions regarding the visit today  The patient agreed to participate  It was my intent to perform this visit via video technology but the patient was not able to do a video connection so the visit was completed via audio telephone only  Subjective: Kellie Herrera is a 61 y o  male who has been screened for COVID-19  Symptom change since last report: resolving  Patient is currently asymptomatic  Patient denies fever, chills, fatigue, malaise, congestion, rhinorrhea, sore throat, anosmia, loss of taste, cough, shortness of breath, chest tightness, abdominal pain, nausea, vomiting, diarrhea, myalgias and headaches  Dima Pisano has been staying home and has isolated themselves in his home  He is taking care to not share personal items and is cleaning all surfaces that are touched often, like counters, tabletops, and doorknobs using household cleaning sprays or wipes  He is wearing a mask when he leaves his room       Date of symptom onset: 12/23/2020  Date of positive COVID-19 PCR: 12/26/2020    Dima Pisano is doing better since being admitted to hospital   He has recovered from COVID-19 infection  He needs to be re-tested for COVID-19 two more times before he can return to his home dialysis center  He denies cough, fever, SOB, diarrhea or any COVID-19 related symptoms  ROS Otherwise negative, no other complaints  Lab Results   Component Value Date    SARSCOV2 Positive (A) 12/26/2020    SARSCOV2 Not Detected 11/23/2020     Past Medical History:   Diagnosis Date    Chronic kidney disease     [facility] )     GERD (gastroesophageal reflux disease)     Hypercholesteremia     Hyperlipidemia     Hypertension     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    Neuropathy     Obesity     [facility] )     last assessed 11/4/16    PVC's (premature ventricular contractions)     sees cardiology Dr Liliya camargo    Stroke [facility])     last weeof July 2018 [address] [place] [address]     Past Surgical History:   Procedure Laterality Date    ABDOMINAL SURGERY      CHOLECYSTECTOMY      Percutaneous    COLONOSCOPY      CYSTOSCOPY      OTHER SURGICAL HISTORY      "stimulator to control bowel movements"    DC ESOPHAGOGASTRODUODENOSCOPY TRANSORAL DIAGNOSTIC N/A 9/27/2016    Procedure: ESOPHAGOGASTRODUODENOSCOPY (EGD); Surgeon: Kenji Garcia MD;  Location: [facility];   Service: Gastroenterology    DC LAP,CHOLECYSTECTOMY N/A 2/29/2016    Procedure: LAPAROSCOPIC CHOLECYSTECTOMY ;  Surgeon: Fito Eubanks DO;  Location: [facility] OR;  Service: General    ROTATOR CUFF REPAIR Right     TOE AMPUTATION Right 10/28/2016    Procedure: 3RD TOE AMPUTATION ;  Surgeon: Kiya Calvo DPM;  Location: [facility];  Service:      Current Outpatient Medications   Medication Sig Dispense Refill    ascorbic acid (VITAMIN C) 1000 MG tablet Take 1 tablet (1,000 mg total) by mouth every 12 (twelve) hours for 13 doses 13 tablet 0    aspirin (ECOTRIN LOW STRENGTH) 81 mg EC tablet Take 81 mg by mouth daily Resume on 8/14      atorvastatin (LIPITOR) 80 mg tablet TAKE 0 5 TABLETS (40 MG TOTAL) BY MOUTH DAILY WITH DINNER 45 tablet 1    Blood Glucose Monitoring Suppl (TRUE METRIX METER) w/Device KIT Use to test blood sugars 3 times daily 1 kit 0    Blood Pressure Monitoring (BLOOD PRESSURE CUFF) MISC Use to check blood pressure before taking blood pressure medication and 1 hour after and follow instructions provided in discharge instructions based on the readings  1 each 0    calcium acetate (PHOSLO) capsule Take 1 capsule (667 mg total) by mouth 3 (three) times a day with meals 90 capsule 0    carvedilol (COREG) 6 25 mg tablet Take 1 tablet (6 25 mg total) by mouth 2 (two) times a day with meals 180 tablet 0    Cholecalciferol (Vitamin D3) 1 25 MG (55796 UT) CAPS TAKE 1 CAPSULE BY MOUTH ONE TIME PER WEEK 12 capsule 2    Continuous Blood Gluc  (DEXCOM G6 ) LANCE Use as directed for continuous glucose monitoring 1 Device 0    Continuous Blood Gluc Sensor (DEXCOM G6 SENSOR) MISC Use as directed for continuous glucose monitoring   Change every 10 days 1 each 11    Continuous Blood Gluc Transmit (DEXCOM G6 TRANSMITTER) MISC Use as directed for continuous glucose monitoring-Change every 3 months 1 each 3    Incontinence Supplies (MALE URINAL) MISC by Does not apply route daily 6 each 3    insulin glargine (Basaglar KwikPen) 100 units/mL injection pen Inject 10 Units under the skin daily      Insulin Pen Needle 32G X 4 MM MISC USE TO INJECT INSULIN 4 TIMES DAILY 120 each 1    Insulin Syringe-Needle U-100 (B-D INS SYRINGE 0 5CC/30GX1/2") 30G X 1/2" 0 5 ML MISC Inject under the skin 4 (four) times a day 360 each 1    nystatin (MYCOSTATIN) powder Apply topically 2 (two) times a day for 10 days 15 g 0    ONETOUCH DELICA LANCETS 40G MISC by Does not apply route 3 (three) times a day 270 each 1    predniSONE 20 mg tablet Take 2 tablets (40 mg total) by mouth daily for 8 days 16 tablet 0    torsemide (DEMADEX) 10 mg tablet Take 5 tablets (50 mg total) by mouth daily 150 tablet 0    zinc sulfate (ZINCATE) 220 mg capsule Take 1 capsule (220 mg total) by mouth daily for 6 doses 6 capsule 0     No current facility-administered medications for this visit  No Known Allergies    Review of Systems   Constitutional: Negative for chills, fatigue and fever  HENT: Negative for congestion, rhinorrhea and sore throat  Respiratory: Negative for cough, chest tightness and shortness of breath  Gastrointestinal: Negative for abdominal pain, diarrhea, nausea and vomiting  Musculoskeletal: Negative for myalgias  Neurological: Negative for headaches  Objective: There were no vitals filed for this visit  VIRTUAL VISIT DISCLAIMER    Ezra Tipton acknowledges that he has consented to an online visit or consultation  He understands that the online visit is based solely on information provided by him, and that, in the absence of a face-to-face physical evaluation by the physician, the diagnosis he receives is both limited and provisional in terms of accuracy and completeness  This is not intended to replace a full medical face-to-face evaluation by the physician  Ezra Tipton understands and accepts these terms

## 2021-01-08 ENCOUNTER — TELEPHONE (OUTPATIENT)
Dept: OTHER | Facility: OTHER | Age: 61
End: 2021-01-08

## 2021-01-08 NOTE — TELEPHONE ENCOUNTER
Patient called the covid hotline to schedule a covid test - he just had one 2+ weeks ago  I can see that he tested positive but since I'm nonclinical I cannot tell a patient test results so when he told me his test was negative on 12/26/2020, I could not correct him  I suggested he call his PCP  Dr Jessica Stewart Rai's office is who I am trying to reach    Awais Joseph plans to call, I just wanted to give you a heads up!

## 2021-01-19 ENCOUNTER — OFFICE VISIT (OUTPATIENT)
Dept: PODIATRY | Facility: CLINIC | Age: 61
End: 2021-01-19
Payer: MEDICARE

## 2021-01-19 VITALS — HEIGHT: 68 IN | WEIGHT: 229 LBS | BODY MASS INDEX: 34.71 KG/M2

## 2021-01-19 DIAGNOSIS — I10 ESSENTIAL HYPERTENSION: Chronic | ICD-10-CM

## 2021-01-19 DIAGNOSIS — E11.42 DIABETIC POLYNEUROPATHY ASSOCIATED WITH TYPE 2 DIABETES MELLITUS (HCC): ICD-10-CM

## 2021-01-19 DIAGNOSIS — L97.429 ULCER OF HEEL, LEFT, WITH UNSPECIFIED SEVERITY (HCC): Primary | ICD-10-CM

## 2021-01-19 DIAGNOSIS — I63.312 CEREBROVASCULAR ACCIDENT (CVA) DUE TO THROMBOSIS OF LEFT MIDDLE CEREBRAL ARTERY (HCC): Primary | ICD-10-CM

## 2021-01-19 PROCEDURE — 3008F BODY MASS INDEX DOCD: CPT | Performed by: INTERNAL MEDICINE

## 2021-01-19 PROCEDURE — 99213 OFFICE O/P EST LOW 20 MIN: CPT | Performed by: PODIATRIST

## 2021-01-19 RX ORDER — CARVEDILOL 6.25 MG/1
TABLET ORAL
Qty: 180 TABLET | Refills: 0 | OUTPATIENT
Start: 2021-01-19

## 2021-01-19 NOTE — TELEPHONE ENCOUNTER
Please call [facility]    Rui Justin is on dialysis and his BP med refills come from his [facility] nephrologist Dr Bean Chavez, no longer from PCP    thanks

## 2021-01-19 NOTE — PROGRESS NOTES
PATIENT:  Daren Kawasaki    1960    ASSESSMENT:     1  Ulcer of heel, left, with unspecified severity ([facility] )     2  Diabetic polyneuropathy associated with type 2 diabetes mellitus ([facility] )         PLAN:  1  Educated disease prevention and risks related to diabetes  Educated proper daily foot care and exam   Instructed proper skin care / protection and footwear  2  Dermagren gauze to left heel ulcer daily  Instructed to identify any signs of infection and related foot problem  3  Instructed compression, elevation, and low sodium diet to manage LE edema  4  RA in 2 weeks for follow-up  Subjective:      HPI: The patients presents with his wife for wound on left heel  He noticed it about 3 days ago  Mild bleeding with tenderness  No redness or edema  No pus  He presents with DM shoes  The following portions of the patient's history were reviewed and updated as appropriate: allergies, current medications, past family history, past medical history, past social history, past surgical history and problem list   All pertinent labs and images were reviewed      Past Medical History  Past Medical History:   Diagnosis Date    Chronic kidney disease     Diabetes mellitus ([place] [address] )     GERD (gastroesophageal reflux disease)     Hypercholesteremia     Hyperlipidemia     Hypertension     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    Neuropathy     Obesity     Osteomyelitis ([facility] )     last assessed 11/4/16    PVC's (premature ventricular contractions)     sees cardiology Dr Shaina camargo    Stroke [facility])     last weeof July 2018 [address]       Past Surgical History  Past Surgical History:   Procedure Laterality Date    ABDOMINAL SURGERY      CHOLECYSTECTOMY      Percutaneous    COLONOSCOPY      CYSTOSCOPY      OTHER SURGICAL HISTORY      "stimulator to control bowel movements"    RI ESOPHAGOGASTRODUODENOSCOPY TRANSORAL DIAGNOSTIC N/A 9/27/2016    Procedure: ESOPHAGOGASTRODUODENOSCOPY (EGD); Surgeon: Tim Austin MD;  [facility]; Service: Gastroenterology    SD LAP,CHOLECYSTECTOMY N/A 2/29/2016    Procedure: LAPAROSCOPIC CHOLECYSTECTOMY ;  Surgeon: Pop Charles DO;  [facility];  Service: General    ROTATOR CUFF REPAIR Right     TOE AMPUTATION Right 10/28/2016    Procedure: 3RD TOE AMPUTATION ;  Surgeon: Elidia Brunner DPM;  [facility];  Service:         Allergies:  Patient has no known allergies  Medications:  Current Outpatient Medications   Medication Sig Dispense Refill    aspirin (ECOTRIN LOW STRENGTH) 81 mg EC tablet Take 81 mg by mouth daily Resume on 8/14      atorvastatin (LIPITOR) 80 mg tablet TAKE 0 5 TABLETS (40 MG TOTAL) BY MOUTH DAILY WITH DINNER 45 tablet 1    Blood Glucose Monitoring Suppl (TRUE METRIX METER) w/Device KIT Use to test blood sugars 3 times daily 1 kit 0    Blood Pressure Monitoring (BLOOD PRESSURE CUFF) MISC Use to check blood pressure before taking blood pressure medication and 1 hour after and follow instructions provided in discharge instructions based on the readings  1 each 0    carvedilol (COREG) 6 25 mg tablet Take 1 tablet (6 25 mg total) by mouth 2 (two) times a day with meals 180 tablet 0    Cholecalciferol (Vitamin D3) 1 25 MG (29530 UT) CAPS TAKE 1 CAPSULE BY MOUTH ONE TIME PER WEEK 12 capsule 2    Continuous Blood Gluc  (DEXCOM G6 ) LANCE Use as directed for continuous glucose monitoring 1 Device 0    Continuous Blood Gluc Sensor (DEXCOM G6 SENSOR) MISC Use as directed for continuous glucose monitoring   Change every 10 days 1 each 11    Continuous Blood Gluc Transmit (DEXCOM G6 TRANSMITTER) MISC Use as directed for continuous glucose monitoring-Change every 3 months 1 each 3    Incontinence Supplies (MALE URINAL) MISC by Does not apply route daily 6 each 3    insulin glargine (Basaglar KwikPen) 100 units/mL injection pen Inject 10 Units under the skin daily      Insulin Pen Needle 32G X 4 MM MISC USE TO INJECT INSULIN 4 TIMES DAILY 120 each 1    Insulin Syringe-Needle U-100 (B-D INS SYRINGE 0 5CC/30GX1/2") 30G X 1/2" 0 5 ML MISC Inject under the skin 4 (four) times a day 360 each 1    ONETOUCH DELICA LANCETS 97Q MISC by Does not apply route 3 (three) times a day 270 each 1    ascorbic acid (VITAMIN C) 1000 MG tablet Take 1 tablet (1,000 mg total) by mouth every 12 (twelve) hours for 13 doses 13 tablet 0    calcium acetate (PHOSLO) capsule Take 1 capsule (667 mg total) by mouth 3 (three) times a day with meals 90 capsule 0    nystatin (MYCOSTATIN) powder Apply topically 2 (two) times a day for 10 days 15 g 0    torsemide (DEMADEX) 10 mg tablet Take 5 tablets (50 mg total) by mouth daily 150 tablet 0    zinc sulfate (ZINCATE) 220 mg capsule Take 1 capsule (220 mg total) by mouth daily for 6 doses 6 capsule 0     No current facility-administered medications for this visit  Social History:  Social History     Socioeconomic History    Marital status: /Civil Union     Spouse name: None    Number of children: None    Years of education: None    Highest education level: Not asked   Occupational History    Occupation: disabled   Social Needs    Financial resource strain: Patient refused    Food insecurity     Worry: Patient refused     Inability: Patient refused    Transportation needs     Medical: No     Non-medical: No   Tobacco Use    Smoking status: Never Smoker    Smokeless tobacco: Never Used   Substance and Sexual Activity    Alcohol use: Not Currently     Frequency: Never     Drinks per session: Patient refused     Binge frequency: Never    Drug use: No    Sexual activity: Not Currently   Lifestyle    Physical activity     Days per week: None     Minutes per session: None    Stress:  Only a little   Relationships    Social connections     Talks on phone: None     Gets together: None     Attends Mandaeism service: None Active member of club or organization: None     Attends meetings of clubs or organizations: None     Relationship status:     Intimate partner violence     Fear of current or ex partner: No     Emotionally abused: No     Physically abused: No     Forced sexual activity: No   Other Topics Concern    None   Social History Narrative    Daily caffeine consumption 2-3 servings a day        Review of Systems   Constitutional: Negative for chills and fever  Respiratory: Negative for cough and shortness of breath  Cardiovascular: Negative for chest pain  Gastrointestinal: Negative for constipation, diarrhea, nausea and vomiting  Neurological: Positive for numbness  Psychiatric/Behavioral: Negative for confusion  Objective:      Ht 5' 8" (1 727 m)   Wt 104 kg (229 lb)   BMI 34 82 kg/m²          Physical Exam  Vitals signs reviewed  Constitutional:       General: He is not in acute distress  Appearance: Normal appearance  He is well-developed  He is not ill-appearing  Cardiovascular:      Rate and Rhythm: Normal rate and regular rhythm  Pulses:           Dorsalis pedis pulses are 0 on the right side and 0 on the left side  Posterior tibial pulses are 1+ on the right side and 1+ on the left side  Comments: Decreased LE edema  Pulmonary:      Effort: Pulmonary effort is normal  No respiratory distress  Musculoskeletal:         General: Deformity present  No tenderness or signs of injury  Comments: No acute joint pain or edema  No redness  No ecchymosis  Hammertoe deformity noted  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  Feet:      Right foot:      Skin integrity: Dry skin present  No ulcer, skin breakdown, erythema, warmth or callus  Left foot:      Skin integrity: Callus and dry skin present  No ulcer, skin breakdown, erythema or warmth  Skin:     General: Skin is warm  Capillary Refill: Capillary refill takes less than 2 seconds        Coloration: Skin is not cyanotic or mottled  Findings: No ecchymosis or erythema  Rash is not purpuric  Nails: There is no clubbing  Comments: Partial thick wound left plantar medial heel  It is 0 3 X 0 3 cm  No signs of infection  Periwound is dry  Neurological:      General: No focal deficit present  Mental Status: He is alert and oriented to person, place, and time  Cranial Nerves: No cranial nerve deficit  Sensory: Sensory deficit present  Motor: No weakness  Psychiatric:         Mood and Affect: Mood normal          Behavior: Behavior normal          Thought Content:  Thought content normal          Judgment: Judgment normal

## 2021-01-19 NOTE — TELEPHONE ENCOUNTER
Neurology called to request an ambulatory referral be placed in [facility]  Pt has an appt with Neurology on 1/21/20  Diagnosis is [id number]  Please advise  99

## 2021-02-03 NOTE — TELEPHONE ENCOUNTER
Ref entered    pls fax to Dr Audie Michaels office & notify patient    thx Cellcept Pregnancy And Lactation Text: This medication is Pregnancy Category D and isn't considered safe during pregnancy. It is unknown if this medication is excreted in breast milk.

## 2021-02-16 ENCOUNTER — OFFICE VISIT (OUTPATIENT)
Dept: PODIATRY | Facility: CLINIC | Age: 61
End: 2021-02-16
Payer: MEDICARE

## 2021-02-16 VITALS — WEIGHT: 229 LBS | BODY MASS INDEX: 34.71 KG/M2 | HEIGHT: 68 IN

## 2021-02-16 DIAGNOSIS — S90.32XA CONTUSION OF LEFT FOOT, INITIAL ENCOUNTER: ICD-10-CM

## 2021-02-16 DIAGNOSIS — M79.672 LEFT FOOT PAIN: ICD-10-CM

## 2021-02-16 DIAGNOSIS — B35.1 ONYCHOMYCOSIS: ICD-10-CM

## 2021-02-16 DIAGNOSIS — Z89.421 ABSENCE OF TOE OF RIGHT FOOT (HCC): ICD-10-CM

## 2021-02-16 DIAGNOSIS — L97.429 ULCER OF HEEL, LEFT, WITH UNSPECIFIED SEVERITY (HCC): Primary | ICD-10-CM

## 2021-02-16 DIAGNOSIS — E11.42 DIABETIC POLYNEUROPATHY ASSOCIATED WITH TYPE 2 DIABETES MELLITUS (HCC): ICD-10-CM

## 2021-02-16 PROCEDURE — 99213 OFFICE O/P EST LOW 20 MIN: CPT | Performed by: PODIATRIST

## 2021-02-16 PROCEDURE — 11721 DEBRIDE NAIL 6 OR MORE: CPT | Performed by: PODIATRIST

## 2021-02-16 NOTE — PROGRESS NOTES
PATIENT:  Nuha Ketan    1960    ASSESSMENT:     1  Ulcer of heel, left, with unspecified severity ([facility] )     2  Diabetic polyneuropathy associated with type 2 diabetes mellitus ([facility] )  Diabetic Shoe Inserts    Diabetic Shoe   3  Absence of toe of right foot ([facility] )  Diabetic Shoe Inserts    Diabetic Shoe   4  Left foot pain  XR foot 3+ vw left   5  Onychomycosis  Debridement       PLAN:  1  Wound on left heel looks healed  Educated disease prevention and risks related to diabetes  Educated proper daily foot care and exam   Instructed proper skin care / protection and footwear  2  He has acute foot pain  X-ray was obtained and personally reviewed  The radiographic findings were discussed with the patient  3  Probable contusion  Less likely inflammatory arthropathy because of lack of edema or localized inflammation  Instructed supportive care, icing, and resting  Instructed to call if his condition does not get better in the next 1-2 weeks  4  Evaluated his old DM shoes  Referred him for new DM shoes and inserts  5  RA in 2 months for follow-up  Procedure: All mycotic toenails were reduced and debrided in length, width, and girth using a nail nipper and dremel  Patient tolerated procedure(s) well without complications  Subjective:      HPI: The patients presents with his wife for wound on left heel  No drainage  No redness or pain at this time  He complained of acute pain around left 5th toe in the last 2 days  He does not recall any injury  No swelling or bruises  The following portions of the patient's history were reviewed and updated as appropriate: allergies, current medications, past family history, past medical history, past social history, past surgical history and problem list   All pertinent labs and images were reviewed      Past Medical History  Past Medical History:   Diagnosis Date    Chronic kidney disease     Diabetes mellitus (HCC)     GERD (gastroesophageal reflux disease)     Hypercholesteremia     Hyperlipidemia     Hypertension     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    Neuropathy     Obesity     Osteomyelitis ([address] )     last assessed 11/4/16    PVC's (premature ventricular contractions)     sees cardiology Dr Celestino camargo    Stroke [facility])     last weeof July 2018 [address] [facility]       Past Surgical History  Past Surgical History:   Procedure Laterality Date    ABDOMINAL SURGERY      CHOLECYSTECTOMY      Percutaneous    COLONOSCOPY      CYSTOSCOPY      OTHER SURGICAL HISTORY      "stimulator to control bowel movements"    NV ESOPHAGOGASTRODUODENOSCOPY TRANSORAL DIAGNOSTIC N/A 9/27/2016    Procedure: ESOPHAGOGASTRODUODENOSCOPY (EGD); Surgeon: Avery Lopez MD;  [facility]; Service: Gastroenterology    NV LAP,CHOLECYSTECTOMY N/A 2/29/2016    Procedure: LAPAROSCOPIC CHOLECYSTECTOMY ;  Surgeon: Suhas Craig DO;  [facility];  Service: General    ROTATOR CUFF REPAIR Right     TOE AMPUTATION Right 10/28/2016    Procedure: 3RD TOE AMPUTATION ;  Surgeon: Feliz Iqbal DPM;  [facility];  Service:         Allergies:  Patient has no known allergies  Medications:  Current Outpatient Medications   Medication Sig Dispense Refill    aspirin (ECOTRIN LOW STRENGTH) 81 mg EC tablet Take 81 mg by mouth daily Resume on 8/14      atorvastatin (LIPITOR) 80 mg tablet TAKE 0 5 TABLETS (40 MG TOTAL) BY MOUTH DAILY WITH DINNER 45 tablet 1    Blood Glucose Monitoring Suppl (TRUE METRIX METER) w/Device KIT Use to test blood sugars 3 times daily 1 kit 0    Blood Pressure Monitoring (BLOOD PRESSURE CUFF) MISC Use to check blood pressure before taking blood pressure medication and 1 hour after and follow instructions provided in discharge instructions based on the readings   1 each 0    carvedilol (COREG) 6 25 mg tablet Take 1 tablet (6 25 mg total) by mouth 2 (two) times a day with meals 180 tablet 0    Cholecalciferol (Vitamin D3) 1 25 MG (67622 UT) CAPS TAKE 1 CAPSULE BY MOUTH ONE TIME PER WEEK 12 capsule 2    Continuous Blood Gluc  (DEXCOM G6 ) LANCE Use as directed for continuous glucose monitoring 1 Device 0    Continuous Blood Gluc Sensor (DEXCOM G6 SENSOR) MISC Use as directed for continuous glucose monitoring  Change every 10 days 1 each 11    Continuous Blood Gluc Transmit (DEXCOM G6 TRANSMITTER) MISC Use as directed for continuous glucose monitoring-Change every 3 months 1 each 3    Incontinence Supplies (MALE URINAL) MISC by Does not apply route daily 6 each 3    insulin glargine (Basaglar KwikPen) 100 units/mL injection pen Inject 10 Units under the skin daily      Insulin Pen Needle 32G X 4 MM MISC USE TO INJECT INSULIN 4 TIMES DAILY 120 each 1    Insulin Syringe-Needle U-100 (B-D INS SYRINGE 0 5CC/30GX1/2") 30G X 1/2" 0 5 ML MISC Inject under the skin 4 (four) times a day 360 each 1    ONETOUCH DELICA LANCETS 26N MISC by Does not apply route 3 (three) times a day 270 each 1    ascorbic acid (VITAMIN C) 1000 MG tablet Take 1 tablet (1,000 mg total) by mouth every 12 (twelve) hours for 13 doses 13 tablet 0    calcium acetate (PHOSLO) capsule Take 1 capsule (667 mg total) by mouth 3 (three) times a day with meals 90 capsule 0    nystatin (MYCOSTATIN) powder Apply topically 2 (two) times a day for 10 days 15 g 0    torsemide (DEMADEX) 10 mg tablet Take 5 tablets (50 mg total) by mouth daily 150 tablet 0    zinc sulfate (ZINCATE) 220 mg capsule Take 1 capsule (220 mg total) by mouth daily for 6 doses 6 capsule 0     No current facility-administered medications for this visit          Social History:  Social History     Socioeconomic History    Marital status: /Civil Union     Spouse name: None    Number of children: None    Years of education: None    Highest education level: Not asked   Occupational History    Occupation: disabled   Social Needs    Financial resource strain: Patient refused    Food insecurity     Worry: Patient refused     Inability: Patient refused    Transportation needs     Medical: No     Non-medical: No   Tobacco Use    Smoking status: Never Smoker    Smokeless tobacco: Never Used   Substance and Sexual Activity    Alcohol use: Not Currently     Frequency: Never     Drinks per session: Patient refused     Binge frequency: Never    Drug use: No    Sexual activity: Not Currently   Lifestyle    Physical activity     Days per week: None     Minutes per session: None    Stress: Only a little   Relationships    Social connections     Talks on phone: None     Gets together: None     Attends Quaker service: None     Active member of club or organization: None     Attends meetings of clubs or organizations: None     Relationship status:     Intimate partner violence     Fear of current or ex partner: No     Emotionally abused: No     Physically abused: No     Forced sexual activity: No   Other Topics Concern    None   Social History Narrative    Daily caffeine consumption 2-3 servings a day        Review of Systems   Constitutional: Negative for chills and fever  Respiratory: Negative for cough and shortness of breath  Cardiovascular: Negative for chest pain  Gastrointestinal: Negative for constipation, diarrhea, nausea and vomiting  Neurological: Positive for numbness  Psychiatric/Behavioral: Negative for confusion  Objective:      Ht 5' 8" (1 727 m)   Wt 104 kg (229 lb)   BMI 34 82 kg/m²          Physical Exam  Vitals signs reviewed  Constitutional:       General: He is not in acute distress  Appearance: Normal appearance  He is well-developed  He is not ill-appearing  Cardiovascular:      Rate and Rhythm: Normal rate and regular rhythm  Pulses:           Dorsalis pedis pulses are 0 on the right side and 0 on the left side          Posterior tibial pulses are 1+ on the right side and 1+ on the left side  Comments: Decreased LE edema  Pulmonary:      Effort: Pulmonary effort is normal  No respiratory distress  Musculoskeletal:         General: Deformity present  No tenderness or signs of injury  Comments: Hammertoe deformity noted  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  Tenderness noted around left 5th met head / MPJ  No inflammation, redness, or edema  Feet:      Right foot:      Skin integrity: Dry skin present  No ulcer, skin breakdown, erythema, warmth or callus  Left foot:      Skin integrity: Callus and dry skin present  No ulcer, skin breakdown, erythema or warmth  Skin:     General: Skin is warm  Capillary Refill: Capillary refill takes less than 2 seconds  Coloration: Skin is not cyanotic or mottled  Findings: No ecchymosis or erythema  Rash is not purpuric  Nails: There is no clubbing  Comments: Wound closed left left plantar medial heel  Thick mycotic nails X 7 with discoloration and onycholysis  He has class finding with previous toe amputation  Neurological:      General: No focal deficit present  Mental Status: He is alert and oriented to person, place, and time  Cranial Nerves: No cranial nerve deficit  Sensory: Sensory deficit present  Motor: No weakness  Psychiatric:         Mood and Affect: Mood normal          Behavior: Behavior normal          Thought Content:  Thought content normal          Judgment: Judgment normal

## 2021-02-19 DIAGNOSIS — E11.65 TYPE 2 DIABETES MELLITUS WITH HYPERGLYCEMIA, WITH LONG-TERM CURRENT USE OF INSULIN (HCC): Primary | ICD-10-CM

## 2021-02-19 DIAGNOSIS — Z79.4 TYPE 2 DIABETES MELLITUS WITH HYPERGLYCEMIA, WITH LONG-TERM CURRENT USE OF INSULIN (HCC): Primary | ICD-10-CM

## 2021-02-19 NOTE — TELEPHONE ENCOUNTER
Please refill trumetrix test strips  Wife states he tests 5 times a day  Please send to [facility]   Pt made appt

## 2021-02-22 RX ORDER — CALCIUM CITRATE/VITAMIN D3 200MG-6.25
1 TABLET ORAL
Qty: 500 EACH | Refills: 4 | Status: SHIPPED | OUTPATIENT
Start: 2021-02-22 | End: 2021-02-22

## 2021-02-22 RX ORDER — CALCIUM CITRATE/VITAMIN D3 200MG-6.25
1 TABLET ORAL 3 TIMES DAILY
Qty: 300 EACH | Refills: 4 | Status: SHIPPED | OUTPATIENT
Start: 2021-02-22 | End: 2021-02-25 | Stop reason: SDUPTHER

## 2021-02-22 NOTE — TELEPHONE ENCOUNTER
PT'S SON CALLED, THEY NEED YOU TO CHANGE THE SCRIPT TO SAY THAT HE TESTS HIS SUGARS 3 TIMES A DAY BECAUSE IT'S THE ONLY WAY MEDICARE WILL PAY FOR THEM    PT IS CURRENTLY OUT AND NEEDS THESE SENT IN TODAY

## 2021-02-25 ENCOUNTER — OFFICE VISIT (OUTPATIENT)
Dept: ENDOCRINOLOGY | Facility: CLINIC | Age: 61
End: 2021-02-25
Payer: MEDICARE

## 2021-02-25 VITALS
HEIGHT: 68 IN | TEMPERATURE: 97.6 F | BODY MASS INDEX: 34.71 KG/M2 | HEART RATE: 76 BPM | WEIGHT: 229 LBS | DIASTOLIC BLOOD PRESSURE: 88 MMHG | SYSTOLIC BLOOD PRESSURE: 126 MMHG

## 2021-02-25 DIAGNOSIS — N18.5 BENIGN HYPERTENSION WITH CHRONIC KIDNEY DISEASE, STAGE V (HCC): ICD-10-CM

## 2021-02-25 DIAGNOSIS — Z79.4 TYPE 2 DIABETES MELLITUS WITH HYPERGLYCEMIA, WITH LONG-TERM CURRENT USE OF INSULIN (HCC): Primary | ICD-10-CM

## 2021-02-25 DIAGNOSIS — E78.2 MIXED HYPERLIPIDEMIA: ICD-10-CM

## 2021-02-25 DIAGNOSIS — E11.65 TYPE 2 DIABETES MELLITUS WITH HYPERGLYCEMIA, WITH LONG-TERM CURRENT USE OF INSULIN (HCC): Primary | ICD-10-CM

## 2021-02-25 DIAGNOSIS — I12.0 BENIGN HYPERTENSION WITH CHRONIC KIDNEY DISEASE, STAGE V (HCC): ICD-10-CM

## 2021-02-25 PROCEDURE — 99214 OFFICE O/P EST MOD 30 MIN: CPT | Performed by: PHYSICIAN ASSISTANT

## 2021-02-25 PROCEDURE — 3008F BODY MASS INDEX DOCD: CPT | Performed by: INTERNAL MEDICINE

## 2021-02-25 RX ORDER — CALCIUM CITRATE/VITAMIN D3 200MG-6.25
1 TABLET ORAL 3 TIMES DAILY
Qty: 100 EACH | Refills: 5 | Status: SHIPPED | OUTPATIENT
Start: 2021-02-25 | End: 2022-03-28 | Stop reason: SDUPTHER

## 2021-02-25 RX ORDER — INSULIN LISPRO 100 [IU]/ML
INJECTION, SOLUTION INTRAVENOUS; SUBCUTANEOUS
Qty: 5 PEN | Refills: 2 | Status: SHIPPED | OUTPATIENT
Start: 2021-02-25 | End: 2021-11-09

## 2021-02-25 RX ORDER — INSULIN GLARGINE 100 [IU]/ML
14 INJECTION, SOLUTION SUBCUTANEOUS DAILY
Qty: 5 PEN | Refills: 2 | Status: SHIPPED | OUTPATIENT
Start: 2021-02-25 | End: 2021-03-15

## 2021-02-25 NOTE — PATIENT INSTRUCTIONS
INSULIN DOSAGE INSTRUCTIONS    Name: Venice Nieves                        : 1960  MRN #: [id number]    Your Current Insulin  and dose is: Before Breakfast Before Lunch Before Evening Meal Bedtime     Humalog Insulin   4 units     4 units   4 units    Regular, Apidra, Humalog orNovolog Sliding Scale:   <80              151-200 +1 +1 +1    201-250 +2 +2 +2 +   251-300 +3 +3 +3 +   301-350 +4 +4 +4 +   >350 +5 +5 +5 +       Basaglar Insulin    14 units     Additional Instructions:   Please test your blood sugar:  _4_ Times per day  X_ Before Breakfast                _ Alternate Testing  X_ Before Lunch                _ 2 Hours After  Meal  X_ Before Evening Meal               _ 3 a m   x_ Before Bedtime Snack     Target Blood sugar range _80_to _180__  Call if your  blood sugar is less than _70_ or greater than _400__  Today's Date: 2021      Hypoglycemia instructions   Venice Nieves  2021  [id number]    Low Blood Sugar    Steps to treat low blood sugar  1  Test blood sugar if you have symptoms of low blood sugar:   Low Blood Sugar Symptoms:  o Sweaty  o Dizzy  o Rapid heartbeat  o Shaky  o Bad mood  o Hungry      2  Treat blood sugar less than 70 with 15 grams of fast-acting carbohydrate:   Examples of 15 grams Fast-Acting Carbohydrate:  o 4 oz juice  o 4 oz regular soda  o 3-4 glucose tablets (chew)  o 3-4 hard candies (chew)          3  Wait 15 minutes and test your blood sugar again     4   If blood sugar is less than 100, repeat steps 2-3     5  When your blood sugar is 100 or more, eat a snack if it will be longer than one hour until your next meal  The snack should be 15 grams of carbohydrate and a protein:   Examples of snacks:  o ½ sandwich  o 6 crackers with cheese  o Piece of fruit with cheese or peanut butter  o 6 crackers with peanut butter

## 2021-02-25 NOTE — PROGRESS NOTES
Patient Progress Note      CC: DM      Referring Provider  No referring provider defined for this encounter  History of Present Illness:   Nuha Aceves is a 61 y o  male with a history of type 2 diabetes with long term use of insulin  Diabetes course has been stable  Complications of DM: CKD, TIA, neuropathy  Denies recent illness or hospitalizations  Denies recent severe hypoglycemic or severe hyperglycemic episodes  Denies any issues with his current regimen  Home glucose monitoring: are performed regularly, 3 times a day     No log or meter for review today  Readings range between 200-300 mg/dl  His wife states he was off Humalog for 2 months because his readings were normal but he has been eating more so now readings are higher  Current regimen:  Basaglar 10 units daily, Humalog 2 units with meals plus scale    compliant most of the time, denies any side effects from medications  Injects in: abdomen  Rotates sites: Yes  Hypoglycemic episodes: No, rare  H/o of hypoglycemia causing hospitalization or Intervention such as glucagon injection  or ambulance call :  Yes  Hypoglycemia symptoms: jitteriness and sweating  Treatment of hypoglycemia: soda, juice  Has glucagon pen at home  Medic alert tag: recommended: Yes     Diabetes education: Yes  Diet: 3 meals per day, 2 snacks per day  Timing of meals is predictable  Diabetic diet compliance:  noncompliant most of the time  Activity: Daily activity is predictable: Yes  No routine exercise  Ophthamology:  Eye exam overdue, August 2019  Its scheduled for next week  Podiatry: goes to Dr Keyla Davila     Has hyperlipidemia: on statin - tolerating well, no myalgias  compliant most of the time, denies any side effects from medications    Thyroid disorders: No  History of pancreatitis: No    Patient Active Problem List   Diagnosis    Type 2 diabetes mellitus ([address] )    Mixed hyperlipidemia    Nephrotic range proteinuria    Cerebrovascular accident (CVA) due to thrombosis of left middle cerebral artery (HCC)    Cognitive impairment    Elevated alkaline phosphatase level    Chronic kidney disease (CKD), stage V (HCC)    Diabetic macular edema (HCC)    GERD (gastroesophageal reflux disease)    Cirrhosis (HCC)    Diabetic polyneuropathy associated with type 2 diabetes mellitus (HCC)    Other constipation    Abnormal EEG    History of stroke    TIA (transient ischemic attack)    Stroke-like symptoms, with right-sided weakness    symptomatic hypoglycemia    Cellulitis    Anxiety associated with depression    Benign hypertension with chronic kidney disease, stage V (HCC)    Urge incontinence    Overactive bladder    Vascular dementia [facility]    Hypertriglyceridemia    S/P arteriovenous (AV) fistula creation    Pre-kidney transplant, listed    Acute kidney injury superimposed on chronic kidney disease ([facility] )    Anemia    History of 2019 novel coronavirus disease (COVID-19)    ESRD on dialysis ([facility] )    Pancytopenia ([facility] )      Past Medical History:   Diagnosis Date    Chronic kidney disease     Diabetes mellitus ([facility]     GERD (gastroesophageal reflux disease)     Hypercholesteremia     Hyperlipidemia     Hypertension     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    Neuropathy     Obesity     Osteomyelitis ([facility] )     last assessed 11/4/16    PVC's (premature ventricular contractions)     sees cardiology Dr Jada camargo    Stroke [facility])     last weeof July 2018 [employer]      Past Surgical History:   Procedure Laterality Date    ABDOMINAL SURGERY      CHOLECYSTECTOMY      Percutaneous    COLONOSCOPY      CYSTOSCOPY      OTHER SURGICAL HISTORY      "stimulator to control bowel movements"    CT ESOPHAGOGASTRODUODENOSCOPY TRANSORAL DIAGNOSTIC N/A 9/27/2016    Procedure: ESOPHAGOGASTRODUODENOSCOPY (EGD); Surgeon: Amish Garcia MD;  [facility];   Service: Gastroenterology    CT LAP,CHOLECYSTECTOMY N/A 2/29/2016 Procedure: LAPAROSCOPIC CHOLECYSTECTOMY ;  Surgeon: Genna Hawkins DO;  [facility];  Service: General    ROTATOR CUFF REPAIR Right     TOE AMPUTATION Right 10/28/2016    Procedure: 3RD TOE AMPUTATION ;  Surgeon: Hannah Calero DPM;  [facility];  Service:       Family History   Problem Relation Age of Onset    Leukemia Mother     Liver disease Mother     Lung cancer Mother         heavy smoker - 3 ppd    Heart disease Father     Liver disease Father     Multiple myeloma Sister     Breast cancer Sister     Urolithiasis Family     Alcohol abuse Neg Hx     Depression Neg Hx     Drug abuse Neg Hx     Substance Abuse Neg Hx     Mental illness Neg Hx      Social History     Tobacco Use    Smoking status: Never Smoker    Smokeless tobacco: Never Used   Substance Use Topics    Alcohol use: Not Currently     Frequency: Never     Drinks per session: Patient refused     Binge frequency: Never     No Known Allergies      Current Outpatient Medications:     ascorbic acid (VITAMIN C) 1000 MG tablet, Take 1 tablet (1,000 mg total) by mouth every 12 (twelve) hours for 13 doses, Disp: 13 tablet, Rfl: 0    aspirin (ECOTRIN LOW STRENGTH) 81 mg EC tablet, Take 81 mg by mouth daily Resume on 8/14, Disp: , Rfl:     atorvastatin (LIPITOR) 80 mg tablet, TAKE 0 5 TABLETS (40 MG TOTAL) BY MOUTH DAILY WITH DINNER, Disp: 45 tablet, Rfl: 1    Blood Glucose Monitoring Suppl (TRUE METRIX METER) w/Device KIT, Use to test blood sugars 3 times daily, Disp: 1 kit, Rfl: 0    Blood Pressure Monitoring (BLOOD PRESSURE CUFF) MISC, Use to check blood pressure before taking blood pressure medication and 1 hour after and follow instructions provided in discharge instructions based on the readings  , Disp: 1 each, Rfl: 0    calcium acetate (PHOSLO) capsule, Take 1 capsule (667 mg total) by mouth 3 (three) times a day with meals, Disp: 90 capsule, Rfl: 0    carvedilol (COREG) 6 25 mg tablet, Take 1 tablet (6 25 mg total) by mouth 2 (two) times a day with meals, Disp: 180 tablet, Rfl: 0    Cholecalciferol (Vitamin D3) 1 25 MG (99626 UT) CAPS, TAKE 1 CAPSULE BY MOUTH ONE TIME PER WEEK, Disp: 12 capsule, Rfl: 2    Continuous Blood Gluc  (DEXCOM G6 ) LANCE, Use as directed for continuous glucose monitoring, Disp: 1 Device, Rfl: 0    Continuous Blood Gluc Sensor (DEXCOM G6 SENSOR) MISC, Use as directed for continuous glucose monitoring   Change every 10 days, Disp: 1 each, Rfl: 11    Continuous Blood Gluc Transmit (DEXCOM G6 TRANSMITTER) MISC, Use as directed for continuous glucose monitoring-Change every 3 months, Disp: 1 each, Rfl: 3    glucose blood (True Metrix Blood Glucose Test) test strip, Use 1 each 3 (three) times a day Use as instructed, Disp: 100 each, Rfl: 5    Incontinence Supplies (MALE URINAL) MISC, by Does not apply route daily, Disp: 6 each, Rfl: 3    insulin glargine (Basaglar KwikPen) 100 units/mL injection pen, Inject 14 Units under the skin daily, Disp: 5 pen, Rfl: 2    Insulin Syringe-Needle U-100 (B-D INS SYRINGE 0 5CC/30GX1/2") 30G X 1/2" 0 5 ML MISC, Inject under the skin 4 (four) times a day, Disp: 360 each, Rfl: 1    ONETOUCH DELICA LANCETS 40P MISC, by Does not apply route 3 (three) times a day, Disp: 270 each, Rfl: 1    insulin lispro (HumaLOG KwikPen) 100 units/mL injection pen, Inject 4 units TID with meals plus scale, Disp: 5 pen, Rfl: 2    Insulin Pen Needle 32G X 4 MM MISC, USE TO INJECT INSULIN 4 TIMES DAILY, Disp: 120 each, Rfl: 1    nystatin (MYCOSTATIN) powder, Apply topically 2 (two) times a day for 10 days, Disp: 15 g, Rfl: 0    torsemide (DEMADEX) 10 mg tablet, Take 5 tablets (50 mg total) by mouth daily, Disp: 150 tablet, Rfl: 0    zinc sulfate (ZINCATE) 220 mg capsule, Take 1 capsule (220 mg total) by mouth daily for 6 doses (Patient not taking: Reported on 2/25/2021), Disp: 6 capsule, Rfl: 0  Review of Systems   Constitutional: Negative for activity change, appetite change, fatigue and unexpected weight change  HENT: Negative for trouble swallowing  Eyes: Negative for visual disturbance  Respiratory: Negative for shortness of breath  Cardiovascular: Negative for chest pain and palpitations  Gastrointestinal: Negative for constipation and diarrhea  Endocrine: Negative for polydipsia and polyuria  Musculoskeletal: Negative  Skin: Negative for wound  Neurological: Positive for numbness  Psychiatric/Behavioral: Negative  Physical Exam:  Body mass index is 34 82 kg/m²  /88   Pulse 76   Temp 97 6 °F (36 4 °C) (Tympanic)   Ht 5' 8" (1 727 m)   Wt 104 kg (229 lb)   BMI 34 82 kg/m²    Wt Readings from Last 3 Encounters:   02/25/21 104 kg (229 lb)   02/16/21 104 kg (229 lb)   01/19/21 104 kg (229 lb)       Physical Exam  Vitals signs and nursing note reviewed  Constitutional:       Appearance: He is well-developed  HENT:      Head: Normocephalic  Eyes:      General: No scleral icterus  Pupils: Pupils are equal, round, and reactive to light  Neck:      Musculoskeletal: Neck supple  Thyroid: No thyromegaly  Cardiovascular:      Rate and Rhythm: Normal rate and regular rhythm  Pulses:           Radial pulses are 2+ on the right side and 2+ on the left side  Heart sounds: No murmur  Pulmonary:      Effort: Pulmonary effort is normal  No respiratory distress  Breath sounds: Normal breath sounds  No wheezing  Skin:     General: Skin is warm and dry  Neurological:      Mental Status: He is alert  Patient's shoes and socks were not removed  Labs: Results for Rebeca Araiza (MRN [id number]) as of 2/25/2021 15:13   Ref   Range 1/2/2021 06:12   Sodium Latest Ref Range: 136 - 145 mmol/L 134 (L)   Potassium Latest Ref Range: 3 5 - 5 3 mmol/L 4 0   Chloride Latest Ref Range: 100 - 108 mmol/L 103   CO2 Latest Ref Range: 21 - 32 mmol/L 19 (L)   Anion Gap Latest Ref Range: 4 - 13 mmol/L 12   BUN Latest Ref Range: 5 - 25 mg/dL 78 (H)   Creatinine Latest Ref Range: 0 60 - 1 30 mg/dL 5 85 (H)   Glucose, Random Latest Ref Range: 65 - 140 mg/dL 207 (H)   Calcium Latest Ref Range: 8 3 - 10 1 mg/dL 8 3   eGFR Latest Units: ml/min/1 73sq m 10   Phosphorus Latest Ref Range: 2 3 - 4 1 mg/dL 5 9 (H)   Results for Charlene Mallory (MRN [id number]) as of 2/25/2021 15:13   Ref  Range 1/11/2020 08:29 2/24/2020 12:30 6/8/2020 00:00 6/8/2020 08:17   Hemoglobin A1C Latest Ref Range: <5 7 % 7 7 (H) 6 9 (H) 5 8 5 8 (H)     Plan:    Diagnoses and all orders for this visit:    Type 2 diabetes mellitus with hyperglycemia, with long-term current use of insulin ([facility])  HGA1C 5 8%  due now  Treatment regimen: increase Basaglar to 14 units and Humalog to 4 units TID with meals plus scale  Discussed intensive insulin regimen does increase risk for hypoglycemia  Episodes of hypoglycemia can lead to permanent disability and death  Discussed risks/complications associated with uncontrolled diabetes  Advised to adhere to diabetic diet, and recommended staying active/exercising routinely as tolerated  Keep carbohydrates consistent to limit blood glucose fluctuations  Advised to call if blood sugars less than 70 mg/dl or over 300 mg/dl  Check blood glucose 3+ times a day  Discussed symptoms and treatment of hypoglycemia  Recommended routine follow-up with podiatry and ophthalmology  Send log in 1-2 weeks  Ordered blood work to complete prior to next visit  -     Hemoglobin A1C; Future  -     Fructosamine; Future  -     Basic metabolic panel; Future  -     insulin glargine (Basaglar KwikPen) 100 units/mL injection pen; Inject 14 Units under the skin daily  -     insulin lispro (HumaLOG KwikPen) 100 units/mL injection pen;  Inject 4 units TID with meals plus scale  -     glucose blood (True Metrix Blood Glucose Test) test strip; Use 1 each 3 (three) times a day Use as instructed    Benign hypertension with chronic kidney disease, stage V ([facility] [address] )  On dialysis  Continue current treatment  Managed by nephrology     Mixed hyperlipidemia  Continue statin therapy        Discussed with the patient diagnosis and treatment and all questions fully answered  He will call me if any problems arise  Counseled patient on diagnostic results, prognosis, risk and benefit of treatment options, instruction for management, importance of treatment compliance, risk factor reduction and impressions        Lissette Brennan PA-C

## 2021-03-04 ENCOUNTER — TELEPHONE (OUTPATIENT)
Dept: OBGYN CLINIC | Facility: HOSPITAL | Age: 61
End: 2021-03-04

## 2021-03-04 NOTE — TELEPHONE ENCOUNTER
Patient sees Dr Tati Campos  Patients spouse is calling in wanting to get a referral for his diabetic shoes to be faxed over to [facility]  They are not able to see this patient without this        Fax# [phone]

## 2021-03-05 ENCOUNTER — TELEPHONE (OUTPATIENT)
Dept: INTERNAL MEDICINE CLINIC | Facility: CLINIC | Age: 61
End: 2021-03-05

## 2021-03-05 NOTE — TELEPHONE ENCOUNTER
We can try and schedule [facility] Bobo for COVID vaccine but most of them right now are a 2 dose series    Is he on transplant list thru [facility]?    I'd like to reach out to his transplant team first to see what exactly they need    thanks

## 2021-03-05 NOTE — TELEPHONE ENCOUNTER
PT'S SON CALLED, RONY MOSS I TOLD HIM THAT HIS FATHER NEEDS TO BE SET UP WITH A [facility] PORTAL AND THEY'LL BE ABLE TO GET HIM THE COVID VACCINE BUT THAT HE CAN CALL BACK ON Monday FOR MORE OF THE DETAILS SHE GOT WHEN SHE CALLED AND SPOKE WITH THE TRANSPLANT TEAM     JUST AN FYI

## 2021-03-05 NOTE — TELEPHONE ENCOUNTER
PT'S WIFE CALLED, WANTS TO KNOW IF OMAR CAN HAVE THE COVID VACCINE     SHE SAID THAT THEY FOUND A KIDNEY DONOR FOR HIM BUT THEY WERE TOLD NO TO HIM HAVING THE TRANSPLANT DUE TO HIM NOT HAVING THE VACCINE YET    PLEASE ADVISE

## 2021-03-05 NOTE — TELEPHONE ENCOUNTER
I spoke with transplant team & they are recommending Pfizer or Edward Roldan vaccine    Can we schedule Kt Pemberton for COVID-19 vaccine?     Thank you

## 2021-03-08 NOTE — TELEPHONE ENCOUNTER
Yecenia son called back with some concerns     He stated on Friday they had a kidney for dad, but were not able to take it because he did not have a covid vaccine   Mae Smiley Son is a little confused too because dad was dx back in Jan with covid  So they 90 day time frame would not be up   Mae Smiley   Patient would like a call when you have a chance to talk about this

## 2021-03-08 NOTE — TELEPHONE ENCOUNTER
Spoke with son, Iván Lucas  I directed him to the nephrology office for more specifics surrounding transplant criteria  I advised him to sigh up with Luigi and complete the COVID questionnaire and the [facility] patient portal   Son informed me that his father is still on the transplant list and would be eligible as long as the donor is COVID-free  Son agreed

## 2021-03-15 DIAGNOSIS — Z79.4 TYPE 2 DIABETES MELLITUS WITH HYPERGLYCEMIA, WITH LONG-TERM CURRENT USE OF INSULIN (HCC): ICD-10-CM

## 2021-03-15 DIAGNOSIS — E11.65 TYPE 2 DIABETES MELLITUS WITH HYPERGLYCEMIA, WITH LONG-TERM CURRENT USE OF INSULIN (HCC): ICD-10-CM

## 2021-03-15 RX ORDER — INSULIN GLARGINE 100 [IU]/ML
14 INJECTION, SOLUTION SUBCUTANEOUS DAILY
Qty: 18 ML | Refills: 1 | Status: SHIPPED | OUTPATIENT
Start: 2021-03-15 | End: 2021-03-16

## 2021-03-16 RX ORDER — INSULIN GLARGINE 100 [IU]/ML
14 INJECTION, SOLUTION SUBCUTANEOUS DAILY
Qty: 10 ML | Refills: 1 | Status: SHIPPED | OUTPATIENT
Start: 2021-03-16 | End: 2021-06-08 | Stop reason: SDUPTHER

## 2021-03-18 DIAGNOSIS — E11.65 TYPE 2 DIABETES MELLITUS WITH HYPERGLYCEMIA, WITH LONG-TERM CURRENT USE OF INSULIN (HCC): ICD-10-CM

## 2021-03-18 DIAGNOSIS — Z79.4 TYPE 2 DIABETES MELLITUS WITH HYPERGLYCEMIA, WITH LONG-TERM CURRENT USE OF INSULIN (HCC): ICD-10-CM

## 2021-03-18 RX ORDER — PEN NEEDLE, DIABETIC 32GX 5/32"
NEEDLE, DISPOSABLE MISCELLANEOUS
Qty: 400 EACH | Refills: 0 | Status: SHIPPED | OUTPATIENT
Start: 2021-03-18 | End: 2021-05-19

## 2021-03-30 DIAGNOSIS — E55.9 VITAMIN D DEFICIENCY: ICD-10-CM

## 2021-03-31 RX ORDER — CHOLECALCIFEROL (VITAMIN D3) 1250 MCG
CAPSULE ORAL
Qty: 12 CAPSULE | Refills: 2 | Status: SHIPPED | OUTPATIENT
Start: 2021-03-31 | End: 2021-10-06

## 2021-04-05 ENCOUNTER — TELEPHONE (OUTPATIENT)
Dept: INTERNAL MEDICINE CLINIC | Facility: CLINIC | Age: 61
End: 2021-04-05

## 2021-04-05 NOTE — TELEPHONE ENCOUNTER
Patient wife called in asking if her  could go to his cousin's    His cousin  of COVID and they want to make sure to it alright to go with his conditions

## 2021-04-05 NOTE — TELEPHONE ENCOUNTER
Called  and asked her if her  had his 2nd dose    She said no he did not so I told her have them not go to the

## 2021-04-09 ENCOUNTER — OFFICE VISIT (OUTPATIENT)
Dept: CARDIOLOGY CLINIC | Facility: CLINIC | Age: 61
End: 2021-04-09
Payer: MEDICARE

## 2021-04-09 VITALS
HEART RATE: 87 BPM | BODY MASS INDEX: 34.71 KG/M2 | TEMPERATURE: 98.7 F | HEIGHT: 68 IN | DIASTOLIC BLOOD PRESSURE: 74 MMHG | SYSTOLIC BLOOD PRESSURE: 120 MMHG | WEIGHT: 229 LBS

## 2021-04-09 DIAGNOSIS — E78.2 MIXED HYPERLIPIDEMIA: ICD-10-CM

## 2021-04-09 DIAGNOSIS — I69.30 HISTORY OF ISCHEMIC CEREBROVASCULAR ACCIDENT (CVA) WITH RESIDUAL DEFICIT: ICD-10-CM

## 2021-04-09 DIAGNOSIS — R06.02 SHORTNESS OF BREATH: ICD-10-CM

## 2021-04-09 DIAGNOSIS — N18.6 ESRD ON DIALYSIS (HCC): ICD-10-CM

## 2021-04-09 DIAGNOSIS — E11.42 DIABETIC POLYNEUROPATHY ASSOCIATED WITH TYPE 2 DIABETES MELLITUS (HCC): ICD-10-CM

## 2021-04-09 DIAGNOSIS — Z99.2 ESRD ON DIALYSIS (HCC): ICD-10-CM

## 2021-04-09 DIAGNOSIS — N18.30 STAGE 3 CHRONIC KIDNEY DISEASE (HCC): ICD-10-CM

## 2021-04-09 DIAGNOSIS — I12.0 BENIGN HYPERTENSION WITH CHRONIC KIDNEY DISEASE, STAGE V (HCC): ICD-10-CM

## 2021-04-09 DIAGNOSIS — N18.5 BENIGN HYPERTENSION WITH CHRONIC KIDNEY DISEASE, STAGE V (HCC): ICD-10-CM

## 2021-04-09 DIAGNOSIS — I63.312 CEREBROVASCULAR ACCIDENT (CVA) DUE TO THROMBOSIS OF LEFT MIDDLE CEREBRAL ARTERY (HCC): ICD-10-CM

## 2021-04-09 DIAGNOSIS — Z86.16 HISTORY OF 2019 NOVEL CORONAVIRUS DISEASE (COVID-19): ICD-10-CM

## 2021-04-09 PROCEDURE — 99214 OFFICE O/P EST MOD 30 MIN: CPT | Performed by: INTERNAL MEDICINE

## 2021-04-09 PROCEDURE — 93000 ELECTROCARDIOGRAM COMPLETE: CPT | Performed by: INTERNAL MEDICINE

## 2021-04-09 RX ORDER — ATORVASTATIN CALCIUM 40 MG/1
40 TABLET, FILM COATED ORAL
Qty: 90 TABLET | Refills: 1 | OUTPATIENT
Start: 2021-04-09 | End: 2021-12-13 | Stop reason: SDUPTHER

## 2021-04-09 NOTE — PROGRESS NOTES
Consultation - Cardiology Office  [facility]  Darinel Simon 64 y o  male MRN: [id number]  : 1960  Unit/Bed#:  Encounter: [id number]      Assessment:     1  Benign hypertension with chronic kidney disease, stage V ([facility] [address] )    2  Mixed hyperlipidemia    3  Diabetic polyneuropathy associated with type 2 diabetes mellitus ([facility] )    4  Shortness of breath    5  Cerebrovascular accident (CVA) due to thrombosis of left middle cerebral artery ([facility] )    6  ESRD on dialysis ([facility] )    7  History of 2019 novel coronavirus disease (COVID-19)    8  Stage 3 chronic kidney disease    9  History of ischemic cerebrovascular accident (CVA) with residual deficit        Discussion summary and Plan:    1  History of end-stage renal disease on dialysis  Patient get dialysis on   He is looking to have transplant  2  Exertion shortness of breath  Patient has exertional shortness of breath which is chronic but he has multiple cardiac risk factor  He may need renal transplant  He has mild-to-moderate valvular disease  Patient will be scheduled for Lexiscan stress test     3  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  Patient on medical Rx he has recovered lot of ambulatory function  Has some memory issues  No other new symptoms  4  Dyslipidemia  Currently high intensity statin  Continue Lipitor  Follow-up LFTs    5  Essential hypertension  Patient blood pressure is acceptable currently he is taking carvedilol 6 25 twice a day along with Demadex  He is no longer on lisinopril  He is on dialysis at this time  6    Obesity with BMI around 35  Advised to lose weight  7  Incontinent  Could be due to dysautonomia or neurogenic bladder  May need Botox injections      Continue current Rx further plan as also of these tests become available  Thank you for your consultation    If you have any question please call me at [phone]    Counseling :  A description of the counseling  Goals and Barriers  Patient's ability to self care: Yes  Medication side effect reviewed with patient in detail and all their questions answered to their satisfaction  Primary Care Physician : Mindy Calvo DO      HPI :     Milton Muniz is a 64y o  year old male who was was initially seen by us many years ago was recently [facility] with near syncopal episode  Patient has past medical significant for chronic kidney disease, diabetes mellitus, diarrhea, history of stroke who presents with unresponsive episode  Patient was going to flea market in his car with his family, he was not driving  Family noted him to be very sweaty and pale, and became unresponsive  Patient was brought to the emergency department and noted to have blood glucose of 40  Patient is not doing well  He denies any new complaints  His echo from [facility] reviewed  During workup he was found to have CKD stage 3/4  He now follows up with [address]  He is doing well  His kidney function has stabilized  He does occasionally get short of breath when he walks  After his stroke is not that active  He is having lot of problem with his urine incontinent  He is scheduled to have a procedure done by urology  He may need clearance for that procedure  04/09/2021  Above reviewed  Patient came for follow-up he is doing well from cardiac point of view  He had AV fistula without any problem  Currently he is on dialysis  He has medical history significant for hypertension, history of stroke, diabetes mellitus, abnormal EKG with RBBB, and previously nonischemic nuclear  He is on dialysis on Monday Wednesday Friday  He feels little bit fatigue and tired more on the dialysis day and occasionally get some episodes of pain  Otherwise mostly he is doing well  He is looking to have transplant    He had COVID-19 infection in December 2020 has recovered very well from it no other cardiovascular issues at this time  He came with his wife  No leg edema no chest pain no palpitations  Today heart rate 87 beats per minute  Review of Systems   Constitutional: Negative for activity change, chills, diaphoresis, fever and unexpected weight change  HENT: Negative for congestion  Eyes: Negative for discharge and redness  Respiratory: Negative for cough, chest tightness, shortness of breath and wheezing  Cardiovascular: Negative  Negative for chest pain, palpitations and leg swelling  Gastrointestinal: Negative for abdominal pain, diarrhea and nausea  Endocrine: Negative  Genitourinary: Negative for decreased urine volume and urgency  End-stage renal disease on dialysis with AV fistula on left arm   Musculoskeletal: Negative  Negative for arthralgias, back pain and gait problem  Skin: Negative for rash and wound  Allergic/Immunologic: Negative  Neurological: Negative for dizziness, seizures, syncope, weakness, light-headedness and headaches  Hematological: Negative  Psychiatric/Behavioral: Negative for agitation and confusion  The patient is not nervous/anxious          Historical Information   Past Medical History:   Diagnosis Date    Chronic kidney disease     Diabetes mellitus ([facility] )     GERD (gastroesophageal reflux disease)     Hypercholesteremia     Hyperlipidemia     Hypertension     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    Neuropathy     Obesity     [facility] )     last assessed 11/4/16    PVC's (premature ventricular contractions)     sees cardiology Dr Mariano camargo    Stroke [facility])     last weeof July 2018 [address] [facility]     Past Surgical History:   Procedure Laterality Date    ABDOMINAL SURGERY      CHOLECYSTECTOMY      Percutaneous    COLONOSCOPY      CYSTOSCOPY      OTHER SURGICAL HISTORY      "stimulator to control bowel movements"    DC ESOPHAGOGASTRODUODENOSCOPY TRANSORAL DIAGNOSTIC N/A 9/27/2016    Procedure: ESOPHAGOGASTRODUODENOSCOPY (EGD); Surgeon: Charlene Tellez MD;  Location: [facility];   Service: Gastroenterology    VA LAP,CHOLECYSTECTOMY N/A 2/29/2016    Procedure: LAPAROSCOPIC CHOLECYSTECTOMY ;  Surgeon: Scarlet Pearce DO;  Location: [facility] Main OR;  Service: General    ROTATOR CUFF REPAIR Right     TOE AMPUTATION Right 10/28/2016    Procedure: 3RD TOE AMPUTATION ;  Surgeon: Vera Marrero DPM;  Location: [facility];  Service:      Social History     Substance and Sexual Activity   Alcohol Use Not Currently    Frequency: Never    Drinks per session: Patient refused    Binge frequency: Never     Social History     Substance and Sexual Activity   Drug Use No     Social History     Tobacco Use   Smoking Status Never Smoker   Smokeless Tobacco Never Used     Family History:   Family History   Problem Relation Age of Onset    Leukemia Mother     Liver disease Mother     Lung cancer Mother         heavy smoker - 3 ppd    Heart disease Father     Liver disease Father     Multiple myeloma Sister     Breast cancer Sister     Urolithiasis Family     Alcohol abuse Neg Hx     Depression Neg Hx     Drug abuse Neg Hx     Substance Abuse Neg Hx     Mental illness Neg Hx        Meds/Allergies     No Known Allergies    Current Outpatient Medications:     ascorbic acid (VITAMIN C) 1000 MG tablet, Take 1 tablet (1,000 mg total) by mouth every 12 (twelve) hours for 13 doses, Disp: 13 tablet, Rfl: 0    aspirin (ECOTRIN LOW STRENGTH) 81 mg EC tablet, Take 81 mg by mouth daily Resume on 8/14, Disp: , Rfl:     atorvastatin (LIPITOR) 40 mg tablet, Take 1 tablet (40 mg total) by mouth daily with dinner, Disp: 90 tablet, Rfl: 1    Blood Glucose Monitoring Suppl (TRUE METRIX METER) w/Device KIT, Use to test blood sugars 3 times daily, Disp: 1 kit, Rfl: 0    carvedilol (COREG) 6 25 mg tablet, Take 1 tablet (6 25 mg total) by mouth 2 (two) times a day with meals, Disp: 180 tablet, Rfl: 0    Cholecalciferol (Vitamin D3) 1 25 MG (54157 UT) CAPS, TAKE 1 CAPSULE BY MOUTH ONE TIME PER WEEK, Disp: 12 capsule, Rfl: 2    Continuous Blood Gluc  (DEXCOM G6 ) LANCE, Use as directed for continuous glucose monitoring, Disp: 1 Device, Rfl: 0    Continuous Blood Gluc Sensor (DEXCOM G6 SENSOR) MISC, Use as directed for continuous glucose monitoring  Change every 10 days, Disp: 1 each, Rfl: 11    Continuous Blood Gluc Transmit (DEXCOM G6 TRANSMITTER) MISC, Use as directed for continuous glucose monitoring-Change every 3 months, Disp: 1 each, Rfl: 3    glucose blood (True Metrix Blood Glucose Test) test strip, Use 1 each 3 (three) times a day Use as instructed, Disp: 100 each, Rfl: 5    Incontinence Supplies (MALE URINAL) MISC, by Does not apply route daily, Disp: 6 each, Rfl: 3    insulin glargine (Lantus) 100 units/mL subcutaneous injection, Inject 14 Units under the skin daily, Disp: 10 mL, Rfl: 1    insulin lispro (HumaLOG KwikPen) 100 units/mL injection pen, Inject 4 units TID with meals plus scale, Disp: 5 pen, Rfl: 2    Insulin Pen Needle (BD Pen Needle Adina U/F) 32G X 4 MM MISC, USE TO INJECT INSULIN 4 TIMES DAILY, Disp: 400 each, Rfl: 0    Insulin Syringe-Needle U-100 (B-D INS SYRINGE 0 5CC/30GX1/2") 30G X 1/2" 0 5 ML MISC, Inject under the skin 4 (four) times a day, Disp: 360 each, Rfl: 1    ONETOUCH DELICA LANCETS 19K MISC, by Does not apply route 3 (three) times a day, Disp: 270 each, Rfl: 1    Blood Pressure Monitoring (BLOOD PRESSURE CUFF) MISC, Use to check blood pressure before taking blood pressure medication and 1 hour after and follow instructions provided in discharge instructions based on the readings   (Patient not taking: Reported on 4/9/2021), Disp: 1 each, Rfl: 0    calcium acetate (PHOSLO) capsule, Take 1 capsule (667 mg total) by mouth 3 (three) times a day with meals (Patient not taking: Reported on 4/9/2021), Disp: 90 capsule, Rfl: 0    nystatin (MYCOSTATIN) powder, Apply topically 2 (two) times a day for 10 days, Disp: 15 g, Rfl: 0    torsemide (DEMADEX) 10 mg tablet, Take 5 tablets (50 mg total) by mouth daily, Disp: 150 tablet, Rfl: 0    zinc sulfate (ZINCATE) 220 mg capsule, Take 1 capsule (220 mg total) by mouth daily for 6 doses (Patient not taking: Reported on 2/25/2021), Disp: 6 capsule, Rfl: 0    Vitals: Blood pressure 120/74, pulse 87, temperature 98 7 °F (37 1 °C), temperature source Temporal, height 5' 8" (1 727 m), weight 104 kg (229 lb)  Body mass index is 34 82 kg/m²  Vitals:    04/09/21 1416   Weight: 104 kg (229 lb)     BP Readings from Last 3 Encounters:   04/09/21 120/74   02/25/21 126/88   01/02/21 133/62         Physical Exam  Constitutional:       General: He is not in acute distress  Appearance: He is well-developed  He is not diaphoretic  HENT:      Head: Normocephalic and atraumatic  Eyes:      Pupils: Pupils are equal, round, and reactive to light  Neck:      Musculoskeletal: Neck supple  Thyroid: No thyromegaly  Vascular: No JVD  Trachea: No tracheal deviation  Cardiovascular:      Rate and Rhythm: Normal rate and regular rhythm  Heart sounds: S1 normal and S2 normal  Heart sounds not distant  Murmur present  Systolic (ejection) murmur present with a grade of 2/6  No friction rub  No gallop  No S3 or S4 sounds  Comments: S1-S2 regular with harsh 3/6 murmur S2 is still distinctly heard S4 present  Pulmonary:      Effort: Pulmonary effort is normal  No respiratory distress  Breath sounds: Normal breath sounds  No wheezing or rales  Comments: No rhonchi no wheezing  Chest:      Chest wall: No tenderness  Abdominal:      General: Bowel sounds are normal  There is no distension  Palpations: Abdomen is soft  Tenderness: There is no abdominal tenderness  Musculoskeletal:         General: No deformity  Comments: No edema   Skin:     General: Skin is warm and dry  Coloration: Skin is not pale  Findings: No rash  Neurological:      Mental Status: He is alert and oriented to person, place, and time  Psychiatric:         Behavior: Behavior normal          Judgment: Judgment normal            Diagnostic Studies Review Cardio:  Nuclear stress test   Nuclear stress test 10/14/2019 were nonischemic EF around 50%  Echo Doppler  Echo Doppler done on 10/06/2020 shows EF 31%, grade 2 diastolic dysfunction, moderate LVH, moderate mitral annulus calcification with mild MR mild-to-moderate aortic stenosis and mild aortic regurgitation       EKG:  EKG today shows normal sinus rhythm nonspecific ST changes heart rate 80 beats per minute  Twelve lead EKG 01/15/2020 shows normal sinus rhythm LVH by voltage  ST abnormality in inferior leads certainly cannot rule ischemia but had a nonischemic nuclear  Twelve lead EKG 03/18/2021 shows normal sinus rhythm heart rate 76 beats per minute nonspecific ST changes in inferior lead no change from old EKG  Twelve lead EKG 04/09/2021 shows normal sinus rhythm bifascicular block heart rate 87 beats per minute  As compared to previous EKG patient has not developed bundle branch block          Lab Review   Lab Results   Component Value Date    WBC 2 39 (L) 01/02/2021    HGB 9 8 (L) 01/02/2021    HCT 30 7 (L) 01/02/2021    MCV 91 01/02/2021    RDW 14 1 01/02/2021     (L) 01/02/2021     BMP:  Lab Results   Component Value Date    SODIUM 134 (L) 01/02/2021    K 4 0 01/02/2021     01/02/2021    CO2 19 (L) 01/02/2021    ANIONGAP 9 01/03/2016    BUN 78 (H) 01/02/2021    CREATININE 5 85 (H) 01/02/2021    GLUC 207 (H) 01/02/2021    GLUF 102 (H) 12/31/2020    CALCIUM 8 3 01/02/2021    CORRECTEDCA 9 0 12/31/2020    EGFR 10 01/02/2021    MG 1 9 12/30/2020     LFT:  Lab Results   Component Value Date    AST 27 12/31/2020    ALT 30 12/31/2020    ALKPHOS 73 12/31/2020    TP 6 8 12/31/2020    ALB 2 9 (L) 12/31/2020      Lab Results   Component Value Date    JGD0ZQIXDPHA 1 946 08/02/2018     Lab Results   Component Value Date    HGBA1C 5 8 (H) 06/08/2020     Lipid Profile:   Lab Results   Component Value Date    CHOLESTEROL 80 01/11/2020    HDL 31 (L) 01/11/2020    LDLCALC 20 01/11/2020    TRIG 210 (H) 12/30/2020     Lab Results   Component Value Date    CHOLESTEROL 80 01/11/2020    CHOLESTEROL 70 10/29/2018     Lab Results   Component Value Date    CKTOTAL 385 (H) 12/30/2020    CKMB 2 0 12/30/2020    CKMBINDEX <1 0 12/30/2020    TROPONINI 0 14 (H) 12/31/2020     Lab Results   Component Value Date    NTBNP 1,259 (H) 12/30/2020            Dr Johnny Irwin MD [facility]      "This note has been constructed using a voice recognition system  Therefore there may be syntax, spelling, and/or grammatical errors   Please call if you have any questions  "

## 2021-04-15 ENCOUNTER — OFFICE VISIT (OUTPATIENT)
Dept: PODIATRY | Facility: CLINIC | Age: 61
End: 2021-04-15
Payer: MEDICARE

## 2021-04-15 VITALS — HEIGHT: 68 IN | BODY MASS INDEX: 34.71 KG/M2 | WEIGHT: 229 LBS

## 2021-04-15 DIAGNOSIS — Z89.421 ABSENCE OF TOE OF RIGHT FOOT (HCC): ICD-10-CM

## 2021-04-15 DIAGNOSIS — E11.42 DIABETIC POLYNEUROPATHY ASSOCIATED WITH TYPE 2 DIABETES MELLITUS (HCC): Primary | ICD-10-CM

## 2021-04-15 DIAGNOSIS — B35.1 ONYCHOMYCOSIS: ICD-10-CM

## 2021-04-15 PROCEDURE — 11721 DEBRIDE NAIL 6 OR MORE: CPT | Performed by: PODIATRIST

## 2021-04-15 NOTE — PROGRESS NOTES
PATIENT:  Darinel Simon    1960    ASSESSMENT:     1  Diabetic polyneuropathy associated with type 2 diabetes mellitus ([facility] [address] )     2  Onychomycosis  Debridement   3  Absence of toe of right foot ([facility] )         PLAN:  1  Educated disease prevention and risks related to diabetes  Educated proper daily foot care and exam   Instructed proper skin care / protection and footwear  2  Awaits DM shoes and inserts  3  RA in 9 weeks  Procedure: All mycotic toenails were reduced and debrided in length, width, and girth using a nail nipper and dremel  Patient tolerated procedure(s) well without complications  Subjective:      HPI:   The patients presents for diabetic foot care and exam   He has high risk diabetic foot with history of ulcer and amputation  No ulcer in his feet  Complained of thick toenails  Left 5th toe pain resolved  The following portions of the patient's history were reviewed and updated as appropriate: allergies, current medications, past family history, past medical history, past social history, past surgical history and problem list   All pertinent labs and images were reviewed      Past Medical History  Past Medical History:   Diagnosis Date    Chronic kidney disease     Diabetes mellitus ([facility] )     GERD (gastroesophageal reflux disease)     Hypercholesteremia     Hyperlipidemia     Hypertension     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    Neuropathy     Obesity     [facility]     last assessed 11/4/16    PVC's (premature ventricular contractions)     sees cardiology Dr Elvia camargo    Stroke [facility])     last weeof July 2018 [email or web address]       Past Surgical History  Past Surgical History:   Procedure Laterality Date    ABDOMINAL SURGERY      CHOLECYSTECTOMY      Percutaneous    COLONOSCOPY      CYSTOSCOPY      OTHER SURGICAL HISTORY      "stimulator to control bowel movements"    CO ESOPHAGOGASTRODUODENOSCOPY TRANSORAL DIAGNOSTIC N/A 9/27/2016    Procedure: ESOPHAGOGASTRODUODENOSCOPY (EGD); Surgeon: Cecille Bansal MD;  Location: [facility]; Service: Gastroenterology    SC LAP,CHOLECYSTECTOMY N/A 2/29/2016    Procedure: LAPAROSCOPIC CHOLECYSTECTOMY ;  Surgeon: Frank Lanier DO;  Location: [facility];  Service: General    ROTATOR CUFF REPAIR Right     TOE AMPUTATION Right 10/28/2016    Procedure: 3RD TOE AMPUTATION ;  Surgeon: Garland Bence, DPM;  Location: [facility];  Service:         Allergies:  Patient has no known allergies  Medications:  Current Outpatient Medications   Medication Sig Dispense Refill    aspirin (ECOTRIN LOW STRENGTH) 81 mg EC tablet Take 81 mg by mouth daily Resume on 8/14      atorvastatin (LIPITOR) 40 mg tablet Take 1 tablet (40 mg total) by mouth daily with dinner 90 tablet 1    Blood Glucose Monitoring Suppl (TRUE METRIX METER) w/Device KIT Use to test blood sugars 3 times daily 1 kit 0    Blood Pressure Monitoring (BLOOD PRESSURE CUFF) MISC Use to check blood pressure before taking blood pressure medication and 1 hour after and follow instructions provided in discharge instructions based on the readings  1 each 0    carvedilol (COREG) 6 25 mg tablet Take 1 tablet (6 25 mg total) by mouth 2 (two) times a day with meals 180 tablet 0    Cholecalciferol (Vitamin D3) 1 25 MG (86632 UT) CAPS TAKE 1 CAPSULE BY MOUTH ONE TIME PER WEEK 12 capsule 2    Continuous Blood Gluc  (DEXCOM G6 ) LANCE Use as directed for continuous glucose monitoring 1 Device 0    Continuous Blood Gluc Sensor (DEXCOM G6 SENSOR) MISC Use as directed for continuous glucose monitoring   Change every 10 days 1 each 11    Continuous Blood Gluc Transmit (DEXCOM G6 TRANSMITTER) MISC Use as directed for continuous glucose monitoring-Change every 3 months 1 each 3    glucose blood (True Metrix Blood Glucose Test) test strip Use 1 each 3 (three) times a day Use as instructed 100 each 5    Incontinence Supplies (MALE URINAL) MISC by Does not apply route daily 6 each 3    insulin glargine (Lantus) 100 units/mL subcutaneous injection Inject 14 Units under the skin daily 10 mL 1    insulin lispro (HumaLOG KwikPen) 100 units/mL injection pen Inject 4 units TID with meals plus scale 5 pen 2    Insulin Pen Needle (BD Pen Needle Adina U/F) 32G X 4 MM MISC USE TO INJECT INSULIN 4 TIMES DAILY 400 each 0    Insulin Syringe-Needle U-100 (B-D INS SYRINGE 0 5CC/30GX1/2") 30G X 1/2" 0 5 ML MISC Inject under the skin 4 (four) times a day 360 each 1    ONETOUCH DELICA LANCETS 86O MISC by Does not apply route 3 (three) times a day 270 each 1    ascorbic acid (VITAMIN C) 1000 MG tablet Take 1 tablet (1,000 mg total) by mouth every 12 (twelve) hours for 13 doses 13 tablet 0    calcium acetate (PHOSLO) capsule Take 1 capsule (667 mg total) by mouth 3 (three) times a day with meals (Patient not taking: Reported on 4/9/2021) 90 capsule 0    nystatin (MYCOSTATIN) powder Apply topically 2 (two) times a day for 10 days 15 g 0    torsemide (DEMADEX) 10 mg tablet Take 5 tablets (50 mg total) by mouth daily 150 tablet 0    zinc sulfate (ZINCATE) 220 mg capsule Take 1 capsule (220 mg total) by mouth daily for 6 doses (Patient not taking: Reported on 2/25/2021) 6 capsule 0     No current facility-administered medications for this visit          Social History:  Social History     Socioeconomic History    Marital status: /Civil Union     Spouse name: None    Number of children: None    Years of education: None    Highest education level: Not asked   Occupational History    Occupation: disabled   Social Needs    Financial resource strain: Patient refused    Food insecurity     Worry: Patient refused     Inability: Patient refused    Transportation needs     Medical: No     Non-medical: No   Tobacco Use    Smoking status: Never Smoker    Smokeless tobacco: Never Used   Substance and Sexual Activity    Alcohol use: Not Currently Frequency: Never     Drinks per session: Patient refused     Binge frequency: Never    Drug use: No    Sexual activity: Not Currently   Lifestyle    Physical activity     Days per week: None     Minutes per session: None    Stress: Only a little   Relationships    Social connections     Talks on phone: None     Gets together: None     Attends Yars[employer] service: None     Active member of club or organization: None     Attends meetings of clubs or organizations: None     Relationship status:     Intimate partner violence     Fear of current or ex partner: No     Emotionally abused: No     Physically abused: No     Forced sexual activity: No   Other Topics Concern    None   Social History Narrative    Daily caffeine consumption 2-3 servings a day        Review of Systems   Constitutional: Negative for chills and fever  Respiratory: Negative for cough and shortness of breath  Cardiovascular: Negative for chest pain  Gastrointestinal: Negative for constipation, diarrhea, nausea and vomiting  Neurological: Positive for numbness  Objective:      Ht 5' 8" (1 727 m) Comment: verbal  Wt 104 kg (229 lb)   BMI 34 82 kg/m²          Physical Exam  Vitals signs reviewed  Constitutional:       General: He is not in acute distress  Appearance: Normal appearance  He is well-developed  He is not ill-appearing  Cardiovascular:      Rate and Rhythm: Normal rate and regular rhythm  Pulses:           Dorsalis pedis pulses are 0 on the right side and 0 on the left side  Posterior tibial pulses are 1+ on the right side and 1+ on the left side  Comments: Decreased LE edema  Pulmonary:      Effort: Pulmonary effort is normal  No respiratory distress  Musculoskeletal:         General: Deformity present  No tenderness or signs of injury  Right foot: No foot drop  Left foot: No foot drop  Comments: Hammertoe deformity noted  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  Feet:      Right foot:      Skin integrity: Dry skin present  No ulcer, skin breakdown, erythema, warmth or callus  Left foot:      Skin integrity: Dry skin present  No ulcer, skin breakdown, erythema, warmth or callus  Skin:     General: Skin is warm  Capillary Refill: Capillary refill takes less than 2 seconds  Coloration: Skin is not cyanotic or mottled  Findings: No ecchymosis or erythema  Rash is not purpuric  Nails: There is no clubbing  Comments: Thick mycotic nails X 7 with discoloration and onycholysis  He has class finding with previous toe amputation  Neurological:      General: No focal deficit present  Mental Status: He is alert and oriented to person, place, and time  Cranial Nerves: No cranial nerve deficit  Sensory: Sensory deficit present  Motor: No weakness  Psychiatric:         Mood and Affect: Mood normal          Behavior: Behavior normal          Thought Content:  Thought content normal          Judgment: Judgment normal

## 2021-05-19 DIAGNOSIS — E11.65 TYPE 2 DIABETES MELLITUS WITH HYPERGLYCEMIA, WITH LONG-TERM CURRENT USE OF INSULIN (HCC): ICD-10-CM

## 2021-05-19 DIAGNOSIS — Z79.4 TYPE 2 DIABETES MELLITUS WITH HYPERGLYCEMIA, WITH LONG-TERM CURRENT USE OF INSULIN (HCC): ICD-10-CM

## 2021-05-19 RX ORDER — PEN NEEDLE, DIABETIC 32GX 5/32"
NEEDLE, DISPOSABLE MISCELLANEOUS
Qty: 400 EACH | Refills: 1 | Status: SHIPPED | OUTPATIENT
Start: 2021-05-19

## 2021-05-25 ENCOUNTER — OFFICE VISIT (OUTPATIENT)
Dept: INTERNAL MEDICINE CLINIC | Facility: CLINIC | Age: 61
End: 2021-05-25
Payer: MEDICARE

## 2021-05-25 ENCOUNTER — TELEPHONE (OUTPATIENT)
Dept: ENDOCRINOLOGY | Facility: CLINIC | Age: 61
End: 2021-05-25

## 2021-05-25 VITALS
DIASTOLIC BLOOD PRESSURE: 78 MMHG | OXYGEN SATURATION: 98 % | WEIGHT: 235.6 LBS | HEART RATE: 76 BPM | HEIGHT: 68 IN | RESPIRATION RATE: 17 BRPM | SYSTOLIC BLOOD PRESSURE: 118 MMHG | TEMPERATURE: 98.1 F | BODY MASS INDEX: 35.71 KG/M2

## 2021-05-25 DIAGNOSIS — N18.6 ESRD ON DIALYSIS (HCC): Primary | ICD-10-CM

## 2021-05-25 DIAGNOSIS — Z79.4 TYPE 2 DIABETES MELLITUS WITH CHRONIC KIDNEY DISEASE ON CHRONIC DIALYSIS, WITH LONG-TERM CURRENT USE OF INSULIN (HCC): ICD-10-CM

## 2021-05-25 DIAGNOSIS — Z99.2 ESRD ON DIALYSIS (HCC): Primary | ICD-10-CM

## 2021-05-25 DIAGNOSIS — I12.0 BENIGN HYPERTENSION WITH CHRONIC KIDNEY DISEASE, STAGE V (HCC): ICD-10-CM

## 2021-05-25 DIAGNOSIS — Z00.00 MEDICARE ANNUAL WELLNESS VISIT, SUBSEQUENT: ICD-10-CM

## 2021-05-25 DIAGNOSIS — E11.22 TYPE 2 DIABETES MELLITUS WITH CHRONIC KIDNEY DISEASE ON CHRONIC DIALYSIS, WITH LONG-TERM CURRENT USE OF INSULIN (HCC): ICD-10-CM

## 2021-05-25 DIAGNOSIS — N18.6 TYPE 2 DIABETES MELLITUS WITH CHRONIC KIDNEY DISEASE ON CHRONIC DIALYSIS, WITH LONG-TERM CURRENT USE OF INSULIN (HCC): ICD-10-CM

## 2021-05-25 DIAGNOSIS — E78.2 MIXED HYPERLIPIDEMIA: ICD-10-CM

## 2021-05-25 DIAGNOSIS — K74.60 HEPATIC CIRRHOSIS, UNSPECIFIED HEPATIC CIRRHOSIS TYPE, UNSPECIFIED WHETHER ASCITES PRESENT (HCC): ICD-10-CM

## 2021-05-25 DIAGNOSIS — N18.5 BENIGN HYPERTENSION WITH CHRONIC KIDNEY DISEASE, STAGE V (HCC): ICD-10-CM

## 2021-05-25 DIAGNOSIS — Z99.2 TYPE 2 DIABETES MELLITUS WITH CHRONIC KIDNEY DISEASE ON CHRONIC DIALYSIS, WITH LONG-TERM CURRENT USE OF INSULIN (HCC): ICD-10-CM

## 2021-05-25 PROBLEM — R29.90 STROKE-LIKE SYMPTOMS: Status: RESOLVED | Noted: 2018-10-28 | Resolved: 2021-05-25

## 2021-05-25 PROBLEM — L03.90 CELLULITIS: Status: RESOLVED | Noted: 2019-02-14 | Resolved: 2021-05-25

## 2021-05-25 PROCEDURE — G0402 INITIAL PREVENTIVE EXAM: HCPCS | Performed by: INTERNAL MEDICINE

## 2021-05-25 PROCEDURE — 99214 OFFICE O/P EST MOD 30 MIN: CPT | Performed by: INTERNAL MEDICINE

## 2021-05-25 NOTE — PROGRESS NOTES
Assessment and Plan:     Problem List Items Addressed This Visit     Benign hypertension with chronic kidney disease, stage V ([place] [address] )    Cirrhosis ([place] [address] )    ESRD on dialysis ([place] [address] ) - Primary    Mixed hyperlipidemia    Type 2 diabetes mellitus ([place] [address] )      Other Visit Diagnoses     Medicare annual wellness visit, subsequent            BMI Counseling: Body mass index is 35 82 kg/m²  The BMI is above normal  Nutrition recommendations include moderation in carbohydrate intake  Preventive health issues were discussed with patient, and age appropriate screening tests were ordered as noted in patient's After Visit Summary  Personalized health advice and appropriate referrals for health education or preventive services given if needed, as noted in patient's After Visit Summary       History of Present Illness:     Patient presents for Medicare Annual Wellness visit    Patient Care Team:  Fritz Garcia DO as PCP - Vinita Boyle MD as PCP - Endocrinology (Endocrinology)  Magdalena Georgi, MD Julieann Bumpers, PA-C as Physician Assistant (Endocrinology)  Heber Maya DPM (Podiatry)     Problem List:     Patient Active Problem List   Diagnosis    Type 2 diabetes mellitus ([place] [address] )    Mixed hyperlipidemia    Nephrotic range proteinuria    Cerebrovascular accident (CVA) due to thrombosis of left middle cerebral artery ([place] [address] )    Cognitive impairment    Elevated alkaline phosphatase level    Diabetic macular edema ([place] [address] )    GERD (gastroesophageal reflux disease)    Cirrhosis ([place] [address] )    Diabetic polyneuropathy associated with type 2 diabetes mellitus ([place] [address] )    Other constipation    Abnormal EEG    History of stroke    TIA (transient ischemic attack)    symptomatic hypoglycemia    Anxiety associated with depression    Benign hypertension with chronic kidney disease, stage V ([facility] )    Urge incontinence    Overactive bladder    Hypertriglyceridemia    S/P arteriovenous (AV) fistula creation    Pre-kidney transplant, listed    Acute kidney injury superimposed on chronic kidney disease ([facility] )    Anemia    History of 2019 novel coronavirus disease (COVID-19)    ESRD on dialysis ([place] [address] )    Pancytopenia ([place] [address] )      Past Medical and Surgical History:     Past Medical History:   Diagnosis Date    Chronic kidney disease     Diabetes mellitus ([facility] )     GERD (gastroesophageal reflux disease)     Hypercholesteremia     Hyperlipidemia     Hypertension     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    Neuropathy     Obesity     Osteomyelitis ([place] [address] )     last assessed 11/4/16    PVC's (premature ventricular contractions)     sees cardiology Dr Morteza camargo    Stroke [facility])     last weeof July 2018 [address] [facility]     Past Surgical History:   Procedure Laterality Date    ABDOMINAL SURGERY      CHOLECYSTECTOMY      Percutaneous    COLONOSCOPY      CYSTOSCOPY      OTHER SURGICAL HISTORY      "stimulator to control bowel movements"    KS ESOPHAGOGASTRODUODENOSCOPY TRANSORAL DIAGNOSTIC N/A 9/27/2016    Procedure: ESOPHAGOGASTRODUODENOSCOPY (EGD); Surgeon: Charlene Tellez MD;  [facility];   Service: Gastroenterology    KS LAP,CHOLECYSTECTOMY N/A 2/29/2016    Procedure: LAPAROSCOPIC CHOLECYSTECTOMY ;  Surgeon: Scarlet Pearce DO;  [facility];  Service: General    ROTATOR CUFF REPAIR Right     TOE AMPUTATION Right 10/28/2016    Procedure: 3RD TOE AMPUTATION ;  Surgeon: Vera Marrero DPM;  [facility];  Service:       Family History:     Family History   Problem Relation Age of Onset    Leukemia Mother     Liver disease Mother     Lung cancer Mother         heavy smoker - 3 ppd    Heart disease Father     Liver disease Father     Multiple myeloma Sister     Breast cancer Sister     Urolithiasis Family     Alcohol abuse Neg Hx     Depression Neg Hx     Drug abuse Neg Hx     Substance Abuse Neg Hx     Mental illness Neg Hx       Social History:     E-Cigarette/Vaping    E-Cigarette Use Never User      E-Cigarette/Vaping Substances    Nicotine No     THC No     CBD No     Flavoring No     Other No     Unknown No      Social History     Socioeconomic History    Marital status: /Civil Union     Spouse name: None    Number of children: None    Years of education: None    Highest education level: Not asked   Occupational History    Occupation: disabled   Social Needs    Financial resource strain: Patient refused    Food insecurity     Worry: Patient refused     Inability: Patient refused    Transportation needs     Medical: No     Non-medical: No   Tobacco Use    Smoking status: Never Smoker    Smokeless tobacco: Never Used   Substance and Sexual Activity    Alcohol use: Not Currently     Frequency: Never     Drinks per session: Patient refused     Binge frequency: Never    Drug use: No    Sexual activity: Not Currently   Lifestyle    Physical activity     Days per week: None     Minutes per session: None    Stress:  Only a little   Relationships    Social connections     Talks on phone: None     Gets together: None     Attends [employer] service: None     Active member of club or organization: None     Attends meetings of clubs or organizations: None     Relationship status:     Intimate partner violence     Fear of current or ex partner: No     Emotionally abused: No     Physically abused: No     Forced sexual activity: No   Other Topics Concern    None   Social History Narrative    Daily caffeine consumption 2-3 servings a day      Medications and Allergies:     Current Outpatient Medications   Medication Sig Dispense Refill    aspirin (ECOTRIN LOW STRENGTH) 81 mg EC tablet Take 81 mg by mouth daily Resume on 8/14      atorvastatin (LIPITOR) 40 mg tablet Take 1 tablet (40 mg total) by mouth daily with dinner 90 tablet 1    BD Pen Needle Adina U/F 32G X 4 MM MISC USE TO INJECT INSULIN 4 TIMES DAILY 400 each 1    Blood Glucose Monitoring Suppl (TRUE METRIX METER) w/Device KIT Use to test blood sugars 3 times daily 1 kit 0    Blood Pressure Monitoring (BLOOD PRESSURE CUFF) MISC Use to check blood pressure before taking blood pressure medication and 1 hour after and follow instructions provided in discharge instructions based on the readings  1 each 0    carvedilol (COREG) 6 25 mg tablet Take 1 tablet (6 25 mg total) by mouth 2 (two) times a day with meals 180 tablet 0    Cholecalciferol (Vitamin D3) 1 25 MG (72844 UT) CAPS TAKE 1 CAPSULE BY MOUTH ONE TIME PER WEEK 12 capsule 2    Continuous Blood Gluc  (DEXCOM G6 ) LANCE Use as directed for continuous glucose monitoring 1 Device 0    Continuous Blood Gluc Sensor (DEXCOM G6 SENSOR) MISC Use as directed for continuous glucose monitoring   Change every 10 days 1 each 11    Continuous Blood Gluc Transmit (DEXCOM G6 TRANSMITTER) MISC Use as directed for continuous glucose monitoring-Change every 3 months 1 each 3    glucose blood (True Metrix Blood Glucose Test) test strip Use 1 each 3 (three) times a day Use as instructed 100 each 5    Incontinence Supplies (MALE URINAL) MISC by Does not apply route daily 6 each 3    insulin glargine (Lantus) 100 units/mL subcutaneous injection Inject 14 Units under the skin daily 10 mL 1    insulin lispro (HumaLOG KwikPen) 100 units/mL injection pen Inject 4 units TID with meals plus scale 5 pen 2    Insulin Syringe-Needle U-100 (B-D INS SYRINGE 0 5CC/30GX1/2") 30G X 1/2" 0 5 ML MISC Inject under the skin 4 (four) times a day 360 each 1    ONETOUCH DELICA LANCETS 49P MISC by Does not apply route 3 (three) times a day 270 each 1    calcium acetate (PHOSLO) capsule Take 1 capsule (667 mg total) by mouth 3 (three) times a day with meals (Patient not taking: Reported on 4/9/2021) 90 capsule 0    nystatin (MYCOSTATIN) powder Apply topically 2 (two) times a day for 10 days 15 g 0    torsemide (DEMADEX) 10 mg tablet Take 5 tablets (50 mg total) by mouth daily 150 tablet 0     No current facility-administered medications for this visit  No Known Allergies   Immunizations:     Immunization History   Administered Date(s) Administered    Hep B, adult 05/19/2021    Pneumococcal Polysaccharide PPV23 09/21/2018    SARS-CoV-2 / COVID-19 mRNA IM (Pfizer-BioNTech) 03/23/2021, 04/21/2021    Tdap 10/24/2016      Health Maintenance:         Topic Date Due    Colorectal Cancer Screening  Never done    HIV Screening  Completed    Hepatitis C Screening  Completed         Topic Date Due    Hepatitis A Vaccine (1 of 2 - Risk 2-dose series) Never done    Pneumococcal Vaccine: Pediatrics (0 to 5 Years) and At-Risk Patients (6 to 64 Years) (2 of 3 - PCV13) 09/21/2019      Medicare Health Risk Assessment:     /78   Pulse 76   Temp 98 1 °F (36 7 °C)   Resp 17   Ht 5' 8" (1 727 m)   Wt 107 kg (235 lb 9 6 oz)   SpO2 98%   BMI 35 82 kg/m²      Shahid Cotto is here for his Subsequent Wellness visit  Health Risk Assessment:   Patient rates overall health as fair  Patient feels that their physical health rating is same  Patient is satisfied with their life  Eyesight was rated as same  Hearing was rated as same  Patient feels that their emotional and mental health rating is same  Patients states they are sometimes angry  Patient states they are often unusually tired/fatigued  Pain experienced in the last 7 days has been some  Patient's pain rating has been 4/10  Patient states that he has experienced weight loss or gain in last 6 months  Fall Risk Screening: In the past year, patient has experienced: no history of falling in past year      Home Safety:  Patient does not have trouble with stairs inside or outside of their home  Patient has working smoke alarms and has working carbon monoxide detector  Home safety hazards include: none  Nutrition:   Current diet is Diabetic and Low Cholesterol       Medications:   Patient is not currently taking any over-the-counter supplements  Patient is able to manage medications  Activities of Daily Living (ADLs)/Instrumental Activities of Daily Living (IADLs):   Walk and transfer into and out of bed and chair?: Yes  Dress and groom yourself?: Yes    Bathe or shower yourself?: Yes    Feed yourself?  Yes  Do your laundry/housekeeping?: Yes  Manage your money, pay your bills and track your expenses?: Yes  Make your own meals?: Yes    Do your own shopping?: Yes    Previous Hospitalizations:   Any hospitalizations or ED visits within the last 12 months?: No      PREVENTIVE SCREENINGS      Cardiovascular Screening:    General: Screening Not Indicated and History Lipid Disorder      Diabetes Screening:     General: Screening Not Indicated and History Diabetes      Colorectal Cancer Screening:     General: Patient Declines      Prostate Cancer Screening:    General: Screening Current      Osteoporosis Screening:    General: Screening Not Indicated      Abdominal Aortic Aneurysm (AAA) Screening:        General: Screening Not Indicated      Lung Cancer Screening:     General: Screening Not Indicated      Hepatitis C Screening:    General: Screening Current    Review of Current Opioid Use    Opioid Risk Tool (ORT) Interpretation: Complete Opioid Risk Tool (ORT)      Raj Auguste DO

## 2021-05-25 NOTE — PROGRESS NOTES
Assessment/Plan:     Diagnoses and all orders for this visit:    ESRD on dialysis [facility])  Comments:  goes M/W/F to dialysis center  has LUE fistula  also on renal transplant list at [facility]  f/u [facility] nephrology    Type 2 diabetes mellitus with chronic kidney disease on chronic dialysis, with long-term current use of insulin ([place] [address] )  Comments:  blood sugars improved per patient and wife  due for BW/A1C and f/u with endocrine    Benign hypertension with chronic kidney disease, stage V ([place] [address] )  Comments:  bp stable, c/w BB and torsemide    Mixed hyperlipidemia  Comments:  taking statin and no SE, c/w rx    Hepatic cirrhosis, unspecified hepatic cirrhosis type, unspecified whether ascites present ([place] [address] )  Comments:  stable, c/w care per GI    Medicare annual wellness visit, subsequent          BMI Counseling: Body mass index is 35 82 kg/m²  The BMI is above normal  Nutrition recommendations include moderation in carbohydrate intake  Subjective:      Patient ID: Agnes Vasquez is a 64 y o  male  HPI     Here for follow up, here with wife during today's visit who provides add'l history  Taking medications as prescribed and on renal transplant list at [facility] on COVID-19 vaccine  Blood sugars are doing better, seeing endocrine for DM care  Scheduled for stress test tmrw and receiving hep B vaccines at this time  Mourning the loss of his nephew who passed away from heart disease(rec'd a heart transplant)  ROS Otherwise negative, no other complaints        Past Medical History:   Diagnosis Date    Chronic kidney disease     Diabetes mellitus ([place] [address] )     GERD (gastroesophageal reflux disease)     Hypercholesteremia     Hyperlipidemia     Hypertension     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    Neuropathy     Obesity     Osteomyelitis ([place] [address] )     last assessed 11/4/16    PVC's (premature ventricular contractions)     sees cardiology Dr Pepe camargo    Stroke [facility])     last weeof July 2018 [facility] [address] [facility]     Vitals:    05/25/21 1504   BP: 118/78   Pulse: 76   Resp: 17   Temp: 98 1 °F (36 7 °C)   SpO2: 98%   Weight: 107 kg (235 lb 9 6 oz)   Height: 5' 8" (1 727 m)     Body mass index is 35 82 kg/m²  Current Outpatient Medications:     aspirin (ECOTRIN LOW STRENGTH) 81 mg EC tablet, Take 81 mg by mouth daily Resume on 8/14, Disp: , Rfl:     atorvastatin (LIPITOR) 40 mg tablet, Take 1 tablet (40 mg total) by mouth daily with dinner, Disp: 90 tablet, Rfl: 1    BD Pen Needle Adina U/F 32G X 4 MM MISC, USE TO INJECT INSULIN 4 TIMES DAILY, Disp: 400 each, Rfl: 1    Blood Glucose Monitoring Suppl (TRUE METRIX METER) w/Device KIT, Use to test blood sugars 3 times daily, Disp: 1 kit, Rfl: 0    Blood Pressure Monitoring (BLOOD PRESSURE CUFF) MISC, Use to check blood pressure before taking blood pressure medication and 1 hour after and follow instructions provided in discharge instructions based on the readings  , Disp: 1 each, Rfl: 0    carvedilol (COREG) 6 25 mg tablet, Take 1 tablet (6 25 mg total) by mouth 2 (two) times a day with meals, Disp: 180 tablet, Rfl: 0    Cholecalciferol (Vitamin D3) 1 25 MG (05226 UT) CAPS, TAKE 1 CAPSULE BY MOUTH ONE TIME PER WEEK, Disp: 12 capsule, Rfl: 2    Continuous Blood Gluc  (DEXCOM G6 ) LANCE, Use as directed for continuous glucose monitoring, Disp: 1 Device, Rfl: 0    Continuous Blood Gluc Sensor (DEXCOM G6 SENSOR) MISC, Use as directed for continuous glucose monitoring   Change every 10 days, Disp: 1 each, Rfl: 11    Continuous Blood Gluc Transmit (DEXCOM G6 TRANSMITTER) MISC, Use as directed for continuous glucose monitoring-Change every 3 months, Disp: 1 each, Rfl: 3    glucose blood (True Metrix Blood Glucose Test) test strip, Use 1 each 3 (three) times a day Use as instructed, Disp: 100 each, Rfl: 5    Incontinence Supplies (MALE URINAL) MISC, by Does not apply route daily, Disp: 6 each, Rfl: 3    insulin glargine (Lantus) 100 units/mL subcutaneous injection, Inject 14 Units under the skin daily, Disp: 10 mL, Rfl: 1    insulin lispro (HumaLOG KwikPen) 100 units/mL injection pen, Inject 4 units TID with meals plus scale, Disp: 5 pen, Rfl: 2    Insulin Syringe-Needle U-100 (B-D INS SYRINGE 0 5CC/30GX1/2") 30G X 1/2" 0 5 ML MISC, Inject under the skin 4 (four) times a day, Disp: 360 each, Rfl: 1    ONETOUCH DELICA LANCETS 12E MISC, by Does not apply route 3 (three) times a day, Disp: 270 each, Rfl: 1    calcium acetate (PHOSLO) capsule, Take 1 capsule (667 mg total) by mouth 3 (three) times a day with meals (Patient not taking: Reported on 4/9/2021), Disp: 90 capsule, Rfl: 0    nystatin (MYCOSTATIN) powder, Apply topically 2 (two) times a day for 10 days, Disp: 15 g, Rfl: 0    torsemide (DEMADEX) 10 mg tablet, Take 5 tablets (50 mg total) by mouth daily, Disp: 150 tablet, Rfl: 0  No Known Allergies      Review of Systems   Constitutional: Negative for fever  HENT: Negative for congestion  Eyes: Negative for visual disturbance  Respiratory: Negative for shortness of breath  Cardiovascular: Negative for chest pain  Gastrointestinal: Negative for abdominal pain  Endocrine: Negative for polyuria  Genitourinary: Negative for dysuria  Musculoskeletal: Negative for arthralgias  Skin: Negative for rash  Allergic/Immunologic: Negative for immunocompromised state  Neurological: Negative for weakness  Psychiatric/Behavioral: Negative for dysphoric mood (but greiving)  Objective:      /78   Pulse 76   Temp 98 1 °F (36 7 °C)   Resp 17   Ht 5' 8" (1 727 m)   Wt 107 kg (235 lb 9 6 oz)   SpO2 98%   BMI 35 82 kg/m²          Physical Exam  Vitals signs reviewed  Constitutional:       Appearance: Normal appearance  He is obese  HENT:      Head: Normocephalic and atraumatic  Cardiovascular:      Rate and Rhythm: Normal rate and regular rhythm  Heart sounds: Murmur present  Systolic murmur present   No S3 or S4 sounds  Pulmonary:      Effort: Pulmonary effort is normal       Breath sounds: No wheezing or rales  Abdominal:      General: Bowel sounds are normal       Palpations: Abdomen is soft  Tenderness: There is no abdominal tenderness  Musculoskeletal:      Right lower leg: No edema  Left lower leg: No edema  Neurological:      Mental Status: He is alert  Mental status is at baseline     Psychiatric:         Mood and Affect: Mood normal          Behavior: Behavior normal

## 2021-05-26 ENCOUNTER — HOSPITAL ENCOUNTER (OUTPATIENT)
Dept: NON INVASIVE DIAGNOSTICS | Facility: CLINIC | Age: 61
Discharge: HOME | End: 2021-05-26
Payer: MEDICARE

## 2021-05-26 ENCOUNTER — TELEPHONE (OUTPATIENT)
Dept: CARDIOLOGY CLINIC | Facility: CLINIC | Age: 61
End: 2021-05-26

## 2021-05-26 DIAGNOSIS — E78.2 MIXED HYPERLIPIDEMIA: ICD-10-CM

## 2021-05-26 DIAGNOSIS — I12.0 BENIGN HYPERTENSION WITH CHRONIC KIDNEY DISEASE, STAGE V (HCC): ICD-10-CM

## 2021-05-26 DIAGNOSIS — N18.5 BENIGN HYPERTENSION WITH CHRONIC KIDNEY DISEASE, STAGE V (HCC): ICD-10-CM

## 2021-05-26 DIAGNOSIS — E11.42 DIABETIC POLYNEUROPATHY ASSOCIATED WITH TYPE 2 DIABETES MELLITUS (HCC): ICD-10-CM

## 2021-05-26 DIAGNOSIS — Z99.2 ESRD ON DIALYSIS (HCC): ICD-10-CM

## 2021-05-26 DIAGNOSIS — Z86.16 HISTORY OF 2019 NOVEL CORONAVIRUS DISEASE (COVID-19): ICD-10-CM

## 2021-05-26 DIAGNOSIS — I63.312 CEREBROVASCULAR ACCIDENT (CVA) DUE TO THROMBOSIS OF LEFT MIDDLE CEREBRAL ARTERY (HCC): ICD-10-CM

## 2021-05-26 DIAGNOSIS — N18.6 ESRD ON DIALYSIS (HCC): ICD-10-CM

## 2021-05-26 DIAGNOSIS — N18.30 STAGE 3 CHRONIC KIDNEY DISEASE (HCC): ICD-10-CM

## 2021-05-26 DIAGNOSIS — R06.02 SHORTNESS OF BREATH: ICD-10-CM

## 2021-05-26 DIAGNOSIS — I69.30 HISTORY OF ISCHEMIC CEREBROVASCULAR ACCIDENT (CVA) WITH RESIDUAL DEFICIT: ICD-10-CM

## 2021-05-26 PROCEDURE — 93018 CV STRESS TEST I&R ONLY: CPT | Performed by: INTERNAL MEDICINE

## 2021-05-26 PROCEDURE — A9502 TC99M TETROFOSMIN: HCPCS

## 2021-05-26 PROCEDURE — 93016 CV STRESS TEST SUPVJ ONLY: CPT | Performed by: INTERNAL MEDICINE

## 2021-05-26 PROCEDURE — 78452 HT MUSCLE IMAGE SPECT MULT: CPT | Performed by: INTERNAL MEDICINE

## 2021-05-26 PROCEDURE — 78452 HT MUSCLE IMAGE SPECT MULT: CPT

## 2021-05-26 PROCEDURE — G1004 CDSM NDSC: HCPCS

## 2021-05-26 PROCEDURE — 93017 CV STRESS TEST TRACING ONLY: CPT

## 2021-05-26 RX ADMIN — REGADENOSON 0.4 MG: 0.08 INJECTION, SOLUTION INTRAVENOUS at 14:01

## 2021-05-26 NOTE — TELEPHONE ENCOUNTER
----- Message from Jamie Pretty MD sent at 5/26/2021  3:56 PM EDT -----  Pt's Patient's stress test is normal    Patient can keep regular appointment  Please call patient with the result

## 2021-05-27 LAB
CHEST PAIN STATEMENT: NORMAL
MAX DIASTOLIC BP: 78 MMHG
MAX HEART RATE: 92 BPM
MAX PREDICTED HEART RATE: 159 BPM
MAX. SYSTOLIC BP: 124 MMHG
PROTOCOL NAME: NORMAL
REASON FOR TERMINATION: NORMAL
TARGET HR FORMULA: NORMAL
TEST INDICATION: NORMAL
TIME IN EXERCISE PHASE: NORMAL

## 2021-06-08 DIAGNOSIS — E11.65 TYPE 2 DIABETES MELLITUS WITH HYPERGLYCEMIA, WITH LONG-TERM CURRENT USE OF INSULIN (HCC): ICD-10-CM

## 2021-06-08 DIAGNOSIS — Z79.4 TYPE 2 DIABETES MELLITUS WITH HYPERGLYCEMIA, WITH LONG-TERM CURRENT USE OF INSULIN (HCC): ICD-10-CM

## 2021-06-08 RX ORDER — INSULIN GLARGINE 100 [IU]/ML
14 INJECTION, SOLUTION SUBCUTANEOUS DAILY
Qty: 10 ML | Refills: 1 | Status: SHIPPED | OUTPATIENT
Start: 2021-06-08 | End: 2021-10-20

## 2021-06-08 NOTE — TELEPHONE ENCOUNTER
PT IS OUT        Pt wife called  He is out of his basaglar  Using 14 units am to [employer] [facility]

## 2021-06-18 ENCOUNTER — APPOINTMENT (OUTPATIENT)
Dept: LAB | Facility: CLINIC | Age: 61
End: 2021-06-18
Payer: MEDICARE

## 2021-06-18 DIAGNOSIS — Z79.4 TYPE 2 DIABETES MELLITUS WITH HYPERGLYCEMIA, WITH LONG-TERM CURRENT USE OF INSULIN (HCC): ICD-10-CM

## 2021-06-18 DIAGNOSIS — E11.65 TYPE 2 DIABETES MELLITUS WITH HYPERGLYCEMIA, WITH LONG-TERM CURRENT USE OF INSULIN (HCC): ICD-10-CM

## 2021-06-18 LAB
ANION GAP SERPL CALCULATED.3IONS-SCNC: 8 MMOL/L (ref 4–13)
BUN SERPL-MCNC: 52 MG/DL (ref 5–25)
CALCIUM SERPL-MCNC: 9.5 MG/DL (ref 8.3–10.1)
CHLORIDE SERPL-SCNC: 100 MMOL/L (ref 100–108)
CO2 SERPL-SCNC: 28 MMOL/L (ref 21–32)
CREAT SERPL-MCNC: 6.98 MG/DL (ref 0.6–1.3)
EST. AVERAGE GLUCOSE BLD GHB EST-MCNC: 140 MG/DL
GFR SERPL CREATININE-BSD FRML MDRD: 8 ML/MIN/1.73SQ M
GLUCOSE P FAST SERPL-MCNC: 178 MG/DL (ref 65–99)
HBA1C MFR BLD: 6.5 %
POTASSIUM SERPL-SCNC: 4.3 MMOL/L (ref 3.5–5.3)
SODIUM SERPL-SCNC: 136 MMOL/L (ref 136–145)

## 2021-06-18 PROCEDURE — 83036 HEMOGLOBIN GLYCOSYLATED A1C: CPT

## 2021-06-18 PROCEDURE — 80048 BASIC METABOLIC PNL TOTAL CA: CPT

## 2021-06-18 PROCEDURE — 82985 ASSAY OF GLYCATED PROTEIN: CPT

## 2021-06-18 PROCEDURE — 36415 COLL VENOUS BLD VENIPUNCTURE: CPT

## 2021-06-19 LAB — FRUCTOSAMINE SERPL-SCNC: 332 UMOL/L (ref 0–285)

## 2021-06-24 DIAGNOSIS — Z79.4 TYPE 2 DIABETES MELLITUS WITH HYPERGLYCEMIA, WITH LONG-TERM CURRENT USE OF INSULIN (HCC): ICD-10-CM

## 2021-06-24 DIAGNOSIS — E11.65 TYPE 2 DIABETES MELLITUS WITH HYPERGLYCEMIA, WITH LONG-TERM CURRENT USE OF INSULIN (HCC): ICD-10-CM

## 2021-07-01 ENCOUNTER — TELEPHONE (OUTPATIENT)
Dept: INTERNAL MEDICINE CLINIC | Facility: CLINIC | Age: 61
End: 2021-07-01

## 2021-07-01 DIAGNOSIS — Z99.2 TYPE 2 DIABETES MELLITUS WITH CHRONIC KIDNEY DISEASE ON CHRONIC DIALYSIS, WITH LONG-TERM CURRENT USE OF INSULIN (HCC): ICD-10-CM

## 2021-07-01 DIAGNOSIS — N18.5 BENIGN HYPERTENSION WITH CHRONIC KIDNEY DISEASE, STAGE V (HCC): ICD-10-CM

## 2021-07-01 DIAGNOSIS — N18.6 ESRD ON DIALYSIS (HCC): ICD-10-CM

## 2021-07-01 DIAGNOSIS — Z79.4 TYPE 2 DIABETES MELLITUS WITH CHRONIC KIDNEY DISEASE ON CHRONIC DIALYSIS, WITH LONG-TERM CURRENT USE OF INSULIN (HCC): ICD-10-CM

## 2021-07-01 DIAGNOSIS — N18.6 TYPE 2 DIABETES MELLITUS WITH CHRONIC KIDNEY DISEASE ON CHRONIC DIALYSIS, WITH LONG-TERM CURRENT USE OF INSULIN (HCC): ICD-10-CM

## 2021-07-01 DIAGNOSIS — E11.22 TYPE 2 DIABETES MELLITUS WITH CHRONIC KIDNEY DISEASE ON CHRONIC DIALYSIS, WITH LONG-TERM CURRENT USE OF INSULIN (HCC): ICD-10-CM

## 2021-07-01 DIAGNOSIS — F41.8 ANXIETY ASSOCIATED WITH DEPRESSION: Primary | ICD-10-CM

## 2021-07-01 DIAGNOSIS — Z78.9 NEED FOR FOLLOW-UP BY SOCIAL WORKER: Primary | ICD-10-CM

## 2021-07-01 DIAGNOSIS — I12.0 BENIGN HYPERTENSION WITH CHRONIC KIDNEY DISEASE, STAGE V (HCC): ICD-10-CM

## 2021-07-01 DIAGNOSIS — Z99.2 ESRD ON DIALYSIS (HCC): ICD-10-CM

## 2021-07-01 DIAGNOSIS — Z86.73 HISTORY OF STROKE: ICD-10-CM

## 2021-07-01 NOTE — TELEPHONE ENCOUNTER
chantel called to ask if there is a facility Bailey Interiano could go to during the day and be picked up in the afternoon that could care for him so she could have some free time?       [phone]

## 2021-07-01 NOTE — TELEPHONE ENCOUNTER
There are some adult day centers in the area that may be an option for [employer] refer Gladis Berrios to  to discuss/advise   they will call Eliana to discuss options    thanks

## 2021-07-02 ENCOUNTER — PATIENT OUTREACH (OUTPATIENT)
Dept: INTERNAL MEDICINE CLINIC | Facility: CLINIC | Age: 61
End: 2021-07-02

## 2021-07-02 NOTE — PROGRESS NOTES
Referral received from Dr Dotty Kamara with request for [facility] [place] SW to outreach patient/patient's wife Unique Hwang (on medical communication consent form) to discuss available Adult Day Centers  Spoke with patient's wife who requested OPCM SW outreach patient's son due to language barrier  Spoke with patient's son Klever Wagnerjere [phone] who stated they are not interested in Adult Day Centers; they are looking for in-home services to assist patient with his ADL needs  Patient does not use DME to ambulate but needs assistance with all ADLs (dressing, bathing, meal prep, medication management)  Patient's wife works during the day and [facility]ver [facility] stated rest of family is unable to assist as well  Patient receives hemod[facility]; family transports and [facility] stated patient would not need HHA services on these days  Patient receives SSD as form of income  OPCM SW discussed Waiver Program; [facility] agreed for [employer] to start referral through Lalo/SHAYNA  OPCM SW will email Waiver Application Steps to Klever Wagner for him to review with patient and patient's wife  OPCM SW explained patient would need to be approved medically and financially  [facility] stated patient would be able to participate in assessment and is able to speak [facility] denied any additional needs for patient at this time  Outreached Lalo/SHAYNA to begin Waiver Application Process  Spoke with Julio Brandt who took needed information and will have rep outreach patient/patient's family to continue process  Update routed to Dr Dotty Kamara

## 2021-07-03 DIAGNOSIS — E78.2 MIXED HYPERLIPIDEMIA: ICD-10-CM

## 2021-07-03 DIAGNOSIS — I69.30 HISTORY OF ISCHEMIC CEREBROVASCULAR ACCIDENT (CVA) WITH RESIDUAL DEFICIT: ICD-10-CM

## 2021-07-03 DIAGNOSIS — N18.30 STAGE 3 CHRONIC KIDNEY DISEASE (HCC): ICD-10-CM

## 2021-07-03 DIAGNOSIS — I63.9 ISCHEMIC CEREBROVASCULAR ACCIDENT (CVA) (HCC): ICD-10-CM

## 2021-07-05 RX ORDER — ATORVASTATIN CALCIUM 40 MG/1
40 TABLET, FILM COATED ORAL
Qty: 90 TABLET | Refills: 1 | Status: SHIPPED | OUTPATIENT
Start: 2021-07-05 | End: 2021-09-21

## 2021-07-12 ENCOUNTER — PATIENT OUTREACH (OUTPATIENT)
Dept: INTERNAL MEDICINE CLINIC | Facility: CLINIC | Age: 61
End: 2021-07-12

## 2021-07-12 NOTE — PROGRESS NOTES
OPCM SW attempted to outreach patient's son Gary Wilkerson to check status of patient and ensure call was received from Waiver/IEB to begin assessment and application process  No answer, voicemail left for Gary Wilkerson, and awaiting return call

## 2021-07-12 NOTE — PROGRESS NOTES
Form completed and I put it on Annie's desk for this morning    It may have been faxed already today    thanks

## 2021-07-12 NOTE — PROGRESS NOTES
Return call received from Abelino Epperson stating he spoke with Lalo/SHAYNA to start Waiver Application Process  Patient has phone interview scheduled for Friday with the IEB  IEB informed Abelino Epperson they will then request documentation from Dr Kayla Younger office (Physician Certification Form)  Will route update to Dr Cuate Epperson denied any questions or any additional needs at this time  OPCM SW encouraged Abelino Epperson to call with any needs  OPCM SW to close case at this time but will remain available to assist with any future needs

## 2021-07-14 ENCOUNTER — TELEPHONE (OUTPATIENT)
Dept: INTERNAL MEDICINE CLINIC | Facility: CLINIC | Age: 61
End: 2021-07-14

## 2021-07-14 ENCOUNTER — HOSPITAL ENCOUNTER (EMERGENCY)
Facility: HOSPITAL | Age: 61
Discharge: HOME | End: 2021-07-14
Attending: EMERGENCY MEDICINE
Payer: MEDICARE

## 2021-07-14 ENCOUNTER — APPOINTMENT (EMERGENCY)
Dept: CT IMAGING | Facility: HOSPITAL | Age: 61
End: 2021-07-14
Payer: MEDICARE

## 2021-07-14 VITALS
SYSTOLIC BLOOD PRESSURE: 213 MMHG | OXYGEN SATURATION: 98 % | HEART RATE: 85 BPM | BODY MASS INDEX: 35.82 KG/M2 | RESPIRATION RATE: 20 BRPM | TEMPERATURE: 97.6 F | DIASTOLIC BLOOD PRESSURE: 91 MMHG | HEIGHT: 68 IN

## 2021-07-14 DIAGNOSIS — R10.84 GENERALIZED ABDOMINAL PAIN: Primary | ICD-10-CM

## 2021-07-14 LAB
ALBUMIN SERPL BCP-MCNC: 3.8 G/DL (ref 3.5–5)
ALP SERPL-CCNC: 138 U/L (ref 46–116)
ALT SERPL W P-5'-P-CCNC: 26 U/L (ref 12–78)
ANION GAP SERPL CALCULATED.3IONS-SCNC: 10 MMOL/L (ref 4–13)
AST SERPL W P-5'-P-CCNC: 14 U/L (ref 5–45)
ATRIAL RATE: 73 BPM
BASOPHILS # BLD AUTO: 0.03 THOUSANDS/ΜL (ref 0–0.1)
BASOPHILS NFR BLD AUTO: 1 % (ref 0–1)
BILIRUB SERPL-MCNC: 0.51 MG/DL (ref 0.2–1)
BUN SERPL-MCNC: 65 MG/DL (ref 5–25)
CALCIUM SERPL-MCNC: 8.9 MG/DL (ref 8.3–10.1)
CHLORIDE SERPL-SCNC: 102 MMOL/L (ref 100–108)
CO2 SERPL-SCNC: 26 MMOL/L (ref 21–32)
CREAT SERPL-MCNC: 6.96 MG/DL (ref 0.6–1.3)
EOSINOPHIL # BLD AUTO: 0.13 THOUSAND/ΜL (ref 0–0.61)
EOSINOPHIL NFR BLD AUTO: 2 % (ref 0–6)
ERYTHROCYTE [DISTWIDTH] IN BLOOD BY AUTOMATED COUNT: 14.2 % (ref 11.6–15.1)
GFR SERPL CREATININE-BSD FRML MDRD: 8 ML/MIN/1.73SQ M
GLUCOSE SERPL-MCNC: 174 MG/DL (ref 65–140)
HCT VFR BLD AUTO: 35.5 % (ref 36.5–49.3)
HGB BLD-MCNC: 11.7 G/DL (ref 12–17)
IMM GRANULOCYTES # BLD AUTO: 0.09 THOUSAND/UL (ref 0–0.2)
IMM GRANULOCYTES NFR BLD AUTO: 1 % (ref 0–2)
LACTATE SERPL-SCNC: 1.4 MMOL/L (ref 0.5–2)
LIPASE SERPL-CCNC: 222 U/L (ref 73–393)
LYMPHOCYTES # BLD AUTO: 1.42 THOUSANDS/ΜL (ref 0.6–4.47)
LYMPHOCYTES NFR BLD AUTO: 22 % (ref 14–44)
MCH RBC QN AUTO: 31.5 PG (ref 26.8–34.3)
MCHC RBC AUTO-ENTMCNC: 33 G/DL (ref 31.4–37.4)
MCV RBC AUTO: 95 FL (ref 82–98)
MONOCYTES # BLD AUTO: 0.63 THOUSAND/ΜL (ref 0.17–1.22)
MONOCYTES NFR BLD AUTO: 10 % (ref 4–12)
NEUTROPHILS # BLD AUTO: 4.03 THOUSANDS/ΜL (ref 1.85–7.62)
NEUTS SEG NFR BLD AUTO: 64 % (ref 43–75)
NRBC BLD AUTO-RTO: 0 /100 WBCS
P AXIS: 41 DEGREES
PLATELET # BLD AUTO: 143 THOUSANDS/UL (ref 149–390)
PMV BLD AUTO: 10.4 FL (ref 8.9–12.7)
POTASSIUM SERPL-SCNC: 4.3 MMOL/L (ref 3.5–5.3)
PR INTERVAL: 176 MS
PROT SERPL-MCNC: 7.8 G/DL (ref 6.4–8.2)
QRS AXIS: -21 DEGREES
QRSD INTERVAL: 160 MS
QT INTERVAL: 444 MS
QTC INTERVAL: 489 MS
RBC # BLD AUTO: 3.72 MILLION/UL (ref 3.88–5.62)
SODIUM SERPL-SCNC: 138 MMOL/L (ref 136–145)
T WAVE AXIS: 18 DEGREES
TROPONIN I SERPL-MCNC: <0.02 NG/ML
VENTRICULAR RATE: 73 BPM
WBC # BLD AUTO: 6.33 THOUSAND/UL (ref 4.31–10.16)

## 2021-07-14 PROCEDURE — 85025 COMPLETE CBC W/AUTO DIFF WBC: CPT | Performed by: EMERGENCY MEDICINE

## 2021-07-14 PROCEDURE — 74174 CTA ABD&PLVS W/CONTRAST: CPT

## 2021-07-14 PROCEDURE — 83690 ASSAY OF LIPASE: CPT | Performed by: EMERGENCY MEDICINE

## 2021-07-14 PROCEDURE — 83605 ASSAY OF LACTIC ACID: CPT | Performed by: EMERGENCY MEDICINE

## 2021-07-14 PROCEDURE — 80053 COMPREHEN METABOLIC PANEL: CPT | Performed by: EMERGENCY MEDICINE

## 2021-07-14 PROCEDURE — 36415 COLL VENOUS BLD VENIPUNCTURE: CPT | Performed by: EMERGENCY MEDICINE

## 2021-07-14 PROCEDURE — 99284 EMERGENCY DEPT VISIT MOD MDM: CPT

## 2021-07-14 PROCEDURE — 93010 ELECTROCARDIOGRAM REPORT: CPT | Performed by: INTERNAL MEDICINE

## 2021-07-14 PROCEDURE — 96374 THER/PROPH/DIAG INJ IV PUSH: CPT

## 2021-07-14 PROCEDURE — 93005 ELECTROCARDIOGRAM TRACING: CPT

## 2021-07-14 PROCEDURE — 84484 ASSAY OF TROPONIN QUANT: CPT | Performed by: EMERGENCY MEDICINE

## 2021-07-14 PROCEDURE — G1004 CDSM NDSC: HCPCS

## 2021-07-14 PROCEDURE — 99285 EMERGENCY DEPT VISIT HI MDM: CPT | Performed by: EMERGENCY MEDICINE

## 2021-07-14 RX ORDER — ONDANSETRON 2 MG/ML
4 INJECTION INTRAMUSCULAR; INTRAVENOUS ONCE
Status: DISCONTINUED | OUTPATIENT
Start: 2021-07-14 | End: 2021-07-14 | Stop reason: HOSPADM

## 2021-07-14 RX ORDER — MORPHINE SULFATE 4 MG/ML
4 INJECTION, SOLUTION INTRAMUSCULAR; INTRAVENOUS ONCE
Status: DISCONTINUED | OUTPATIENT
Start: 2021-07-14 | End: 2021-07-14 | Stop reason: HOSPADM

## 2021-07-14 RX ORDER — MORPHINE SULFATE 4 MG/ML
4 INJECTION, SOLUTION INTRAMUSCULAR; INTRAVENOUS ONCE
Status: COMPLETED | OUTPATIENT
Start: 2021-07-14 | End: 2021-07-14

## 2021-07-14 RX ADMIN — MORPHINE SULFATE 4 MG: 4 INJECTION INTRAVENOUS at 13:09

## 2021-07-14 RX ADMIN — IOHEXOL 100 ML: 350 INJECTION, SOLUTION INTRAVENOUS at 15:18

## 2021-07-14 NOTE — TELEPHONE ENCOUNTER
chantel called and said Vasu Katz has stomach pain for a couple days and diarrhea that started today, (-) fever and no other symptoms  She is not sure if it is from dialysis? Please advise      He has dialysis today at 12pm and will be home by 5pm       [phone]

## 2021-07-14 NOTE — TELEPHONE ENCOUNTER
He is not available because he is in [facility]  I left a voicemail asking her to call back but she may be with him at [facility]

## 2021-07-14 NOTE — ED PROCEDURE NOTE
PROCEDURE  ECG 12 Lead Documentation Only    Date/Time: 7/14/2021 1:28 PM  Performed by: Donya Sy MD  Authorized by: Donya Sy MD     Indications / Diagnosis:  UPPER ABD PAIN   ECG reviewed by me, the ED Provider: yes    Patient location:  ED and bedside  Previous ECG:     Previous ECG:  Compared to current    Comparison ECG info:  12/31/20- RBBB IS NEW - NO OTHER SIGN CHANGES    Similarity:  Changes noted    Comparison to cardiac monitor: Yes    Interpretation:     Interpretation: non-specific    Rate:     ECG rate:  73    ECG rate assessment: normal    Rhythm:     Rhythm: sinus rhythm    Ectopy:     Ectopy: none    QRS:     QRS axis:  Normal    QRS intervals:   Wide  Conduction:     Conduction: abnormal      Abnormal conduction: complete RBBB    ST segments:     ST segments:  Normal  T waves:     T waves: flattening      Flattening:  III, aVF, V1 and V2  Q waves:     Q waves:  V1  Other findings:     Other findings: U wave    Comments:      NO ECG SIGNS OF ISCHEMIA/ INJURY          Donya Sy MD  07/14/21 1337

## 2021-07-14 NOTE — TELEPHONE ENCOUNTER
I'm not sure what the question is  Ronda Cutting Ronda Cutting Does he want to come in and be seen    I have openings today    thanks

## 2021-07-14 NOTE — DISCHARGE INSTRUCTIONS
Diagnosis; generalized abdominal pain     - diet as tolerated     - for any abdominal pain- over the counter tylenol 500 mg every 4 hrs     - please call your primary doctor tomorrow  to schedule an appointment for a recheck within 1 week     - please return to  the er for any   fevers- temp > 100 4/ any worsening abdominal pain/ any bloody /coffee ground  vomitus/ any bloody black tarry stools  or any new/ worsening/concerning symptoms to you

## 2021-07-14 NOTE — QUICK NOTE
Briefly spoke with patient and his family member  He reports feeling abdominal pain which is worsened with HD for the past 2 weeks  He has been on HD since September 2020  He initially had about 50 lb weight loss since starting HD  His last dialysis session was on Monday  He reports having high weight gains between treatments and his family reports non compliance with dietary and fluid restriction  Per facility, he often cuts treatment short and sometimes cancel  Likely, he is not receiving adequate HD  He denies any fevers or chills  He denies eating anything different  He reports diarrhea yesterday and 3 occurences today  He denies chest pain or SOB  He denies N,V,D  He does not exhibit any LE edema and remain on room air  He has OP treatment scheduled for tomorrow at 11:30  From renal, okay to discharge from ED if lab work and imaging is stable  He will continue with HD tomorrow and then Friday as scheduled  If admission is required, will provide HD today vs tomorrow dependent on nursing availability  Thank you

## 2021-07-18 NOTE — ED PROVIDER NOTES
History  Chief Complaint   Patient presents with    Abdominal Pain      generalized pain in abdomen, started this morning, dizziness, loose stools  present denies nause and vomiting  Suppose to go to diaylsis today, rescheduled with provider until tomorrow due to not feeling well  64 yr male with esrd on hd-- did nto go to hd today has rescheduled for tomorrow as per wife-- pt c/o luq abd pain initially during hd  For last 2 weeks- last night with 2 episodes of non bloody diarrhea with onset of non meal raalted same upper ad pain -- no fevers/ no n/v-- does make urine- but no urinary comps-- pt states no sob-- some chest pain this am coming from abd       History provided by:  Patient and spouse   used: No    Abdominal Pain  Associated symptoms: diarrhea    Associated symptoms: no constipation, no nausea and no vomiting        Prior to Admission Medications   Prescriptions Last Dose Informant Patient Reported? Taking? BD Pen Needle Adina U/F 32G X 4 MM MISC   No No   Sig: USE TO INJECT INSULIN 4 TIMES DAILY   Blood Glucose Monitoring Suppl (True Metrix Meter) w/Device KIT   No No   Sig: Use to test blood sugars 3 times daily   Blood Pressure Monitoring (BLOOD PRESSURE CUFF) MISC  Spouse/Significant Other No No   Sig: Use to check blood pressure before taking blood pressure medication and 1 hour after and follow instructions provided in discharge instructions based on the readings  Cholecalciferol (Vitamin D3) 1 25 MG (89707 UT) CAPS Unknown at Unknown time Spouse/Significant Other No No   Sig: TAKE 1 CAPSULE BY MOUTH ONE TIME PER WEEK   Continuous Blood Gluc  (DEXCOM G6 ) LANCE  Spouse/Significant Other No No   Sig: Use as directed for continuous glucose monitoring   Continuous Blood Gluc Sensor (DEXCOM G6 SENSOR) MISC  Spouse/Significant Other No No   Sig: Use as directed for continuous glucose monitoring   Change every 10 days   Continuous Blood Gluc Transmit (DEXCOM G6 TRANSMITTER) MISC  Spouse/Significant Other No No   Sig: Use as directed for continuous glucose monitoring-Change every 3 months   Incontinence Supplies (MALE URINAL) MISC  Spouse/Significant Other No No   Sig: by Does not apply route daily   Insulin Syringe-Needle U-100 (B-D INS SYRINGE 0 5CC/30GX1/2") 30G X 1/2" 0 5 ML [facility] [place] at Unknown time Spouse/Significant Other No Yes   Sig: Inject under the skin 4 (four) times a day   ONETOUCH DELICA LANCETS 94G MISC  Spouse/Significant Other No No   Sig: by Does not apply route 3 (three) times a day   aspirin (ECOTRIN LOW STRENGTH) 81 mg EC tablet 7/14/2021 at Unknown time Spouse/Significant Other Yes Yes   Sig: Take 81 mg by mouth daily Resume on 8/14   atorvastatin (LIPITOR) 40 mg tablet 7/13/2021 at Unknown time  No Yes   Sig: Take 1 tablet (40 mg total) by mouth daily with dinner   atorvastatin (LIPITOR) 40 mg tablet 7/13/2021 at Unknown time  No Yes   Sig: Take 1 tablet (40 mg total) by mouth daily with dinner   calcium acetate (PHOSLO) capsule   No No   Sig: Take 1 capsule (667 mg total) by mouth 3 (three) times a day with meals   Patient not taking: Reported on 4/9/2021   carvedilol (COREG) 6 25 mg tablet 7/13/2021 at Unknown time Spouse/Significant Other No Yes   Sig: Take 1 tablet (6 25 mg total) by mouth 2 (two) times a day with meals   glucose blood (True Metrix Blood Glucose Test) test strip  Spouse/Significant Other No No   Sig: Use 1 each 3 (three) times a day Use as instructed   insulin glargine (Lantus) 100 units/mL subcutaneous injection 7/14/2021 at Unknown time  No Yes   Sig: Inject 14 Units under the skin daily   insulin lispro (HumaLOG KwikPen) 100 units/mL injection pen Unknown at Unknown time Spouse/Significant Other No No   Sig: Inject 4 units TID with meals plus scale   nystatin (MYCOSTATIN) powder   No No   Sig: Apply topically 2 (two) times a day for 10 days   torsemide (DEMADEX) 10 mg tablet 7/13/2021 at Unknown time  No Yes   Sig: Take 5 tablets (50 mg total) by mouth daily      Facility-Administered Medications: None       Past Medical History:   Diagnosis Date    Chronic kidney disease     Diabetes mellitus ([facility] [address] )     GERD (gastroesophageal reflux disease)     Hypercholesteremia     Hyperlipidemia     Hypertension     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    Neuropathy     Obesity     Osteomyelitis ([facility] [address] )     last assessed 11/4/16    PVC's (premature ventricular contractions)     sees cardiology Dr Lupe camargo    Stroke [facility])     last weeof July 2018 [address] [facility]       Past Surgical History:   Procedure Laterality Date    ABDOMINAL SURGERY      CHOLECYSTECTOMY      Percutaneous    COLONOSCOPY      CYSTOSCOPY      OTHER SURGICAL HISTORY      "stimulator to control bowel movements"    [place] ESOPHAGOGASTRODUODENOSCOPY TRANSORAL DIAGNOSTIC N/A 9/27/2016    Procedure: ESOPHAGOGASTRODUODENOSCOPY (EGD); Surgeon: Sveta Nicholson MD;  [facility]: [facility]; Service: Gastroenterology    [place] LAP,CHOLECYSTECTOMY N/A 2/29/2016    Procedure: LAPAROSCOPIC CHOLECYSTECTOMY ;  Surgeon: Shon Martinez DO;  [facility];  Service: General    ROTATOR CUFF REPAIR Right     TOE AMPUTATION Right 10/28/2016    Procedure: 3RD TOE AMPUTATION ;  Surgeon: Aram Merchant DPM;  [facility];  Service:        Family History   Problem Relation Age of Onset    Leukemia Mother     Liver disease Mother     Lung cancer Mother         heavy smoker - 3 ppd    Heart disease Father     Liver disease Father     Multiple myeloma Sister     Breast cancer Sister     Urolithiasis Family     Alcohol abuse Neg Hx     Depression Neg Hx     Drug abuse Neg Hx     Substance Abuse Neg Hx     Mental illness Neg Hx      I have reviewed and agree with the history as documented      E-Cigarette/Vaping    E-Cigarette Use Never User      E-Cigarette/Vaping Substances    Nicotine No     THC No     CBD No     Flavoring No     Other No     Unknown No      Social History     Tobacco Use    Smoking status: Never Smoker    Smokeless tobacco: Never Used   Vaping Use    Vaping Use: Never used   Substance Use Topics    Alcohol use: Not Currently    Drug use: No       Review of Systems   Constitutional: Negative  HENT: Negative  Eyes: Negative  Respiratory: Negative  Cardiovascular: Negative  Gastrointestinal: Positive for abdominal pain and diarrhea  Negative for abdominal distention, anal bleeding, blood in stool, constipation, nausea, rectal pain and vomiting  Endocrine: Negative  Genitourinary: Negative  Musculoskeletal: Negative  Skin: Negative  Allergic/Immunologic: Negative  Neurological: Negative  Hematological: Negative  Psychiatric/Behavioral: Negative  Physical Exam  Physical Exam  Vitals and nursing note reviewed  Constitutional:       General: He is not in acute distress  Appearance: He is well-developed  He is not ill-appearing, toxic-appearing or diaphoretic  Comments: avss-- htnsive-- pulse ox 98 % on ra- interpretation is normal- no intervention --    HENT:      Head: Normocephalic and atraumatic  Mouth/Throat:      Mouth: Mucous membranes are moist       Pharynx: Oropharynx is clear  No pharyngeal swelling or oropharyngeal exudate  Eyes:      General: No scleral icterus  Extraocular Movements: Extraocular movements intact  Pupils: Pupils are equal, round, and reactive to light  Comments: Mm pink   Cardiovascular:      Rate and Rhythm: Normal rate and regular rhythm  Heart sounds: Normal heart sounds  No murmur heard  No friction rub  No gallop  Pulmonary:      Effort: Pulmonary effort is normal  No respiratory distress  Breath sounds: Normal breath sounds  No stridor  No wheezing, rhonchi or rales  Chest:      Chest wall: No tenderness  Abdominal:      General: Bowel sounds are normal  There is no distension or abdominal bruit   There are no signs of injury  Palpations: Abdomen is soft  There is no shifting dullness, fluid wave, hepatomegaly, splenomegaly, mass or pulsatile mass  Tenderness: There is abdominal tenderness in the epigastric area and left upper quadrant  There is no right CVA tenderness, left CVA tenderness, guarding or rebound  Negative signs include Ragland's sign, Rovsing's sign, McBurney's sign, psoas sign and obturator sign  Hernia: No hernia is present  There is no hernia in the umbilical area, ventral area, left inguinal area, right femoral area, left femoral area or right inguinal area  Comments: Mild epigastric/ luq tenderness-- no peritoneal signs- no pulsatile abd mass/bruit/ tenderness- no peritoneal signs   Skin:     General: Skin is warm  Capillary Refill: Capillary refill takes less than 2 seconds  Coloration: Skin is not cyanotic, jaundiced, mottled or pale  Findings: No erythema or rash  Comments: lue av fistula- pos thrill/bruit   Neurological:      General: No focal deficit present  Mental Status: He is alert and oriented to person, place, and time  Cranial Nerves: No cranial nerve deficit  Motor: No weakness     Psychiatric:         Mood and Affect: Mood normal          Behavior: Behavior normal          Vital Signs  ED Triage Vitals   Temperature Pulse Respirations Blood Pressure SpO2   07/14/21 1217 07/14/21 1217 07/14/21 1217 07/14/21 1217 07/14/21 1217   97 6 °F (36 4 °C) 77 22 (!) 227/94 97 %      Temp Source Heart Rate Source Patient Position - Orthostatic VS BP Location FiO2 (%)   07/14/21 1217 07/14/21 1556 07/14/21 1217 07/14/21 1217 --   Oral Monitor Lying Right arm       Pain Score       07/14/21 1309       6           Vitals:    07/14/21 1217 07/14/21 1425 07/14/21 1556   BP: (!) 227/94 168/79 (!) 213/91   Pulse: 77 73 85   Patient Position - Orthostatic VS: Lying  Lying         Visual Acuity      ED Medications  Medications   morphine (PF) 4 mg/mL injection 4 mg (4 mg Intravenous Given 7/14/21 1309)   iohexol (OMNIPAQUE) 350 MG/ML injection (SINGLE-DOSE) 100 mL (100 mL Intravenous Given 7/14/21 1518)       Diagnostic Studies  Results Reviewed     Procedure Component Value Units Date/Time    Lactic acid, plasma [[id number]]  (Normal) Collected: 07/14/21 1327    Lab Status: Final result Specimen: Blood from Arm, Right Updated: 07/14/21 1350     LACTIC ACID 1 4 mmol/L     Narrative:      Result may be elevated if tourniquet was used during collection      Troponin I [[id number]]  (Normal) Collected: 07/14/21 1313    Lab Status: Final result Specimen: Blood from Arm, Right Updated: 07/14/21 1339     Troponin I <0 02 ng/mL     Comprehensive metabolic panel [[id number]]  (Abnormal) Collected: 07/14/21 1313    Lab Status: Final result Specimen: Blood from Arm, Right Updated: 07/14/21 1337     Sodium 138 mmol/L      Potassium 4 3 mmol/L      Chloride 102 mmol/L      CO2 26 mmol/L      ANION GAP 10 mmol/L      BUN 65 mg/dL      Creatinine 6 96 mg/dL      Glucose 174 mg/dL      Calcium 8 9 mg/dL      AST 14 U/L      ALT 26 U/L      Alkaline Phosphatase 138 U/L      Total Protein 7 8 g/dL      Albumin 3 8 g/dL      Total Bilirubin 0 51 mg/dL      eGFR 8 ml/min/1 73sq m     Narrative:      [place] guidelines for Chronic Kidney Disease (CKD):     Stage 1 with normal or high GFR (GFR > 90 mL/min/1 73 square meters)    Stage 2 Mild CKD (GFR = 60-89 mL/min/1 73 square meters)    Stage 3A Moderate CKD (GFR = 45-59 mL/min/1 73 square meters)    Stage 3B Moderate CKD (GFR = 30-44 mL/min/1 73 square meters)    Stage 4 Severe CKD (GFR = 15-29 mL/min/1 73 square meters)    Stage 5 End Stage CKD (GFR <15 mL/min/1 73 square meters)  Note: GFR calculation is accurate only with a steady state creatinine    Lipase [[id number]]  (Normal) Collected: 07/14/21 1313    Lab Status: Final result Specimen: Blood from Arm, Right Updated: 07/14/21 1337     Lipase 222 u/L     CBC and differential [[id number]]  (Abnormal) Collected: 07/14/21 1313    Lab Status: Final result Specimen: Blood from Arm, Right Updated: 07/14/21 1322     WBC 6 33 Thousand/uL      RBC 3 72 Million/uL      Hemoglobin 11 7 g/dL      Hematocrit 35 5 %      MCV 95 fL      MCH 31 5 pg      MCHC 33 0 g/dL      RDW 14 2 %      MPV 10 4 fL      Platelets 559 Thousands/uL      nRBC 0 /100 WBCs      Neutrophils Relative 64 %      Immat GRANS % 1 %      Lymphocytes Relative 22 %      Monocytes Relative 10 %      Eosinophils Relative 2 %      Basophils Relative 1 %      Neutrophils Absolute 4 03 Thousands/µL      Immature Grans Absolute 0 09 Thousand/uL      Lymphocytes Absolute 1 42 Thousands/µL      Monocytes Absolute 0 63 Thousand/µL      Eosinophils Absolute 0 13 Thousand/µL      Basophils Absolute 0 03 Thousands/µL                  CTA abdomen pelvis w wo contrast   Final Result by Pamella Quintero MD (07/14 9523)      Abdominal aorta and branches demonstrate moderate calcified atherosclerosis with aneurysmal dissection  Patent celiac artery, superior mesenteric artery and inferior mesenteric artery and superior mesenteric, inferior mesenteric and portal veins  No signs of bowel ischemia  Hepatosplenomegaly        Workstation performed: [id number]                    Procedures  Procedures         ED Course  ED Course as of Jul 18 1012   Wed Jul 14, 2021   1307 Er md note- ANO-RECTAL EXAM -- NORMAL ANAL EXAM- SCANT BROWN HEME NEG STOOL IN VAULT       1509 ER MD NOTE- PT- RE-EVALUATED JACKIEKADI ROMERO- [address] --- NOW WITH NAUSEA- WILL RE DOSE PAIN MEDICATION- GIVE NAUSEA MEDICATION AND RE-EVAL -- PENDING CTA OF ABD      1706 Er md note- discussed ct scan and all alb results with pt and wife- pt feels comfortable going home will d/c- has hd session rescheduled for tomorrow- wife well aware - pt in nad upon er d/c resting comfortabely MDM    Disposition  Final diagnoses:   Generalized abdominal pain     Time reflects when diagnosis was documented in both MDM as applicable and the Disposition within this note     Time User Action Codes Description Comment    7/14/2021  5:03 PM Dexter Manriquez Add [R10 84] Generalized abdominal pain       ED Disposition     ED Disposition Condition Date/Time Comment    Discharge Stable Wed Jul 14, 2021  5:03 PM Alexei Cabrera discharge to home/self care              Follow-up Information    None         Discharge Medication List as of 7/14/2021  5:10 PM      CONTINUE these medications which have NOT CHANGED    Details   aspirin (ECOTRIN LOW STRENGTH) 81 mg EC tablet Take 81 mg by mouth daily Resume on 8/14, Historical Med      !! atorvastatin (LIPITOR) 40 mg tablet Take 1 tablet (40 mg total) by mouth daily with dinner, Starting Fri 4/9/2021, Phone In      !! atorvastatin (LIPITOR) 40 mg tablet Take 1 tablet (40 mg total) by mouth daily with dinner, Starting Mon 7/5/2021, Normal      carvedilol (COREG) 6 25 mg tablet Take 1 tablet (6 25 mg total) by mouth 2 (two) times a day with meals, Starting Mon 10/26/2020, Normal      insulin glargine (Lantus) 100 units/mL subcutaneous injection Inject 14 Units under the skin daily, Starting Tue 6/8/2021, Normal      Insulin Syringe-Needle U-100 (B-D INS SYRINGE 0 5CC/30GX1/2") 30G X 1/2" 0 5 ML MISC Inject under the skin 4 (four) times a day, Starting Fri 6/5/2020, Normal      torsemide (DEMADEX) 10 mg tablet Take 5 tablets (50 mg total) by mouth daily, Starting Sat 10/10/2020, Until Wed 7/14/2021, Normal      BD Pen Needle Adina U/F 32G X 4 MM MISC USE TO INJECT INSULIN 4 TIMES DAILY, Normal      Blood Glucose Monitoring Suppl (True Metrix Meter) w/Device KIT Use to test blood sugars 3 times daily, Normal      Blood Pressure Monitoring (BLOOD PRESSURE CUFF) MISC Use to check blood pressure before taking blood pressure medication and 1 hour after and follow instructions provided in discharge instructions based on the readings  , Print      calcium acetate (PHOSLO) capsule Take 1 capsule (667 mg total) by mouth 3 (three) times a day with meals, Starting Fri 10/9/2020, Until Thu 2/25/2021, Normal      Cholecalciferol (Vitamin D3) 1 25 MG (49030 UT) CAPS TAKE 1 CAPSULE BY MOUTH ONE TIME PER WEEK, Normal      Continuous Blood Gluc  (DEXCOM G6 ) LANCE Use as directed for continuous glucose monitoring, Normal      Continuous Blood Gluc Sensor (DEXCOM G6 SENSOR) MISC Use as directed for continuous glucose monitoring  Change every 10 days, Normal      Continuous Blood Gluc Transmit (DEXCOM G6 TRANSMITTER) MISC Use as directed for continuous glucose monitoring-Change every 3 months, Normal      glucose blood (True Metrix Blood Glucose Test) test strip Use 1 each 3 (three) times a day Use as instructed, Starting Thu 2/25/2021, Normal      Incontinence Supplies (MALE URINAL) MISC by Does not apply route daily, Starting Mon 9/24/2018, Print      insulin lispro (HumaLOG KwikPen) 100 units/mL injection pen Inject 4 units TID with meals plus scale, Normal      nystatin (MYCOSTATIN) powder Apply topically 2 (two) times a day for 10 days, Starting Wed 1/6/2021, Until Sat 1/16/2021, Normal      ONETOUCH DELICA LANCETS 49J MISC by Does not apply route 3 (three) times a day, Starting Tue 11/27/2018, Normal       !! - Potential duplicate medications found  Please discuss with provider  No discharge procedures on file      PDMP Review       Value Time User    PDMP Reviewed  Yes 12/30/2020 10:40 PM Bneito Love MD          ED Provider  Electronically Signed by           David Dorado MD  07/18/21 5971

## 2021-07-22 ENCOUNTER — OFFICE VISIT (OUTPATIENT)
Dept: PODIATRY | Facility: CLINIC | Age: 61
End: 2021-07-22
Payer: MEDICARE

## 2021-07-22 VITALS
SYSTOLIC BLOOD PRESSURE: 159 MMHG | WEIGHT: 235 LBS | DIASTOLIC BLOOD PRESSURE: 74 MMHG | HEIGHT: 68 IN | BODY MASS INDEX: 35.61 KG/M2 | HEART RATE: 83 BPM

## 2021-07-22 DIAGNOSIS — B35.1 ONYCHOMYCOSIS: ICD-10-CM

## 2021-07-22 DIAGNOSIS — Z89.421 ABSENCE OF TOE OF RIGHT FOOT (HCC): ICD-10-CM

## 2021-07-22 DIAGNOSIS — E11.42 DIABETIC POLYNEUROPATHY ASSOCIATED WITH TYPE 2 DIABETES MELLITUS (HCC): Primary | ICD-10-CM

## 2021-07-22 DIAGNOSIS — L85.1 ACQUIRED KERATODERMA: ICD-10-CM

## 2021-07-22 PROCEDURE — 11721 DEBRIDE NAIL 6 OR MORE: CPT | Performed by: PODIATRIST

## 2021-07-22 PROCEDURE — 11056 PARNG/CUTG B9 HYPRKR LES 2-4: CPT | Performed by: PODIATRIST

## 2021-07-22 NOTE — PROGRESS NOTES
PATIENT:  Antonio Sheffield    1960    ASSESSMENT:     1  Diabetic polyneuropathy associated with type 2 diabetes mellitus ([address] )     2  Absence of toe of right foot ([address] )     3  Onychomycosis     4  Acquired keratoderma         PLAN:  1  Educated disease prevention and risks related to diabetes  Educated proper daily foot care and exam   Instructed proper skin care / protection and footwear  2  Awaits DM shoes and inserts  3  RA in 9 weeks  Procedure: All mycotic toenails were reduced and debrided in length, width, and girth using a nail nipper and dremel  All hyperkeratotic skin lesion(s) were sharply pared with a scalpel with no bleeding or evidence of ulceration  Patient tolerated procedure(s) well without complications  Subjective:      HPI:   The patients presents for diabetic foot care and exam   He has high risk diabetic foot with history of ulcer and amputation  No ulcer in his feet  Complained of thick toenails  BS is under control  Still waiting for DM shoes  The following portions of the patient's history were reviewed and updated as appropriate: allergies, current medications, past family history, past medical history, past social history, past surgical history and problem list   All pertinent labs and images were reviewed      Past Medical History  Past Medical History:   Diagnosis Date    Chronic kidney disease     [facility]     GERD (gastroesophageal reflux disease)     Hypercholesteremia     Hyperlipidemia     Hypertension     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    Neuropathy     Obesity     Osteomyelitis ([address] )     last assessed 11/4/16    PVC's (premature ventricular contractions)     sees cardiology Dr Jose Martin camargo    Stroke [facility])     last weeof July 2018 [address]       Past Surgical History  Past Surgical History:   Procedure Laterality Date    ABDOMINAL SURGERY      CHOLECYSTECTOMY      Percutaneous    COLONOSCOPY      CYSTOSCOPY      OTHER SURGICAL HISTORY      "stimulator to control bowel movements"    OR ESOPHAGOGASTRODUODENOSCOPY TRANSORAL DIAGNOSTIC N/A 9/27/2016    Procedure: ESOPHAGOGASTRODUODENOSCOPY (EGD); Surgeon: Garry Langley MD;  [facility]; Service: Gastroenterology    OR LAP,CHOLECYSTECTOMY N/A 2/29/2016    Procedure: LAPAROSCOPIC CHOLECYSTECTOMY ;  Surgeon: James Siddiqui DO;  [facility];  Service: General    ROTATOR CUFF REPAIR Right     TOE AMPUTATION Right 10/28/2016    Procedure: 3RD TOE AMPUTATION ;  Surgeon: Janet Bernal DPM;  [facility];  Service:         Allergies:  Patient has no known allergies  Medications:  Current Outpatient Medications   Medication Sig Dispense Refill    aspirin (ECOTRIN LOW STRENGTH) 81 mg EC tablet Take 81 mg by mouth daily Resume on 8/14      atorvastatin (LIPITOR) 40 mg tablet Take 1 tablet (40 mg total) by mouth daily with dinner 90 tablet 1    atorvastatin (LIPITOR) 40 mg tablet Take 1 tablet (40 mg total) by mouth daily with dinner 90 tablet 1    BD Pen Needle Adina U/F 32G X 4 MM MISC USE TO INJECT INSULIN 4 TIMES DAILY 400 each 1    Blood Glucose Monitoring Suppl (True Metrix Meter) w/Device KIT Use to test blood sugars 3 times daily 1 kit 0    Blood Pressure Monitoring (BLOOD PRESSURE CUFF) MISC Use to check blood pressure before taking blood pressure medication and 1 hour after and follow instructions provided in discharge instructions based on the readings   1 each 0    calcium acetate (PHOSLO) capsule Take 1 capsule (667 mg total) by mouth 3 (three) times a day with meals (Patient not taking: Reported on 4/9/2021) 90 capsule 0    carvedilol (COREG) 6 25 mg tablet Take 1 tablet (6 25 mg total) by mouth 2 (two) times a day with meals 180 tablet 0    Cholecalciferol (Vitamin D3) 1 25 MG (68279 UT) CAPS TAKE 1 CAPSULE BY MOUTH ONE TIME PER WEEK 12 capsule 2    Continuous Blood Gluc  (DEXCOM G6 ) LANCE Use as directed for continuous glucose monitoring 1 Device 0    Continuous Blood Gluc Sensor (DEXCOM G6 SENSOR) MISC Use as directed for continuous glucose monitoring  Change every 10 days 1 each 11    Continuous Blood Gluc Transmit (DEXCOM G6 TRANSMITTER) MISC Use as directed for continuous glucose monitoring-Change every 3 months 1 each 3    glucose blood (True Metrix Blood Glucose Test) test strip Use 1 each 3 (three) times a day Use as instructed 100 each 5    Incontinence Supplies (MALE URINAL) MISC by Does not apply route daily 6 each 3    insulin glargine (Lantus) 100 units/mL subcutaneous injection Inject 14 Units under the skin daily 10 mL 1    insulin lispro (HumaLOG KwikPen) 100 units/mL injection pen Inject 4 units TID with meals plus scale 5 pen 2    Insulin Syringe-Needle U-100 (B-D INS SYRINGE 0 5CC/30GX1/2") 30G X 1/2" 0 5 ML MISC Inject under the skin 4 (four) times a day 360 each 1    nystatin (MYCOSTATIN) powder Apply topically 2 (two) times a day for 10 days 15 g 0    ONETOUCH DELICA LANCETS 63W MISC by Does not apply route 3 (three) times a day 270 each 1    torsemide (DEMADEX) 10 mg tablet Take 5 tablets (50 mg total) by mouth daily 150 tablet 0     No current facility-administered medications for this visit         Social History:  Social History     Socioeconomic History    Marital status: /Civil Union     Spouse name: None    Number of children: None    Years of education: None    Highest education level: Not asked   Occupational History    Occupation: disabled   Tobacco Use    Smoking status: Never Smoker    Smokeless tobacco: Never Used   Vaping Use    Vaping Use: Never used   Substance and Sexual Activity    Alcohol use: Not Currently    Drug use: No    Sexual activity: Not Currently   Other Topics Concern    None   Social History Narrative    Daily caffeine consumption 2-3 servings a day     Social Determinants of Health     Financial Resource Strain: Unknown    Difficulty of Paying Living Expenses: Patient refused   Food Insecurity: Unknown    Worried About Running Out of Food in the Last Year: Patient refused    [address] in the Last Year: Patient refused   Transportation Needs: No Transportation Needs    Lack of Transportation (Medical): No    Lack of Transportation (Non-Medical): No   Physical Activity:     Days of Exercise per Week:     Minutes of Exercise per Session:    Stress:     Feeling of Stress :    Social Connections:     Frequency of Communication with Friends and Family:     Frequency of Social Gatherings with Friends and Family:     Attends [employer] Services:     Active Member of Clubs or Organizations:     Attends Club or Organization Meetings:     Marital Status:    Intimate Partner Violence:     Fear of Current or Ex-Partner:     Emotionally Abused:     Physically Abused:     Sexually Abused:         Review of Systems   Constitutional: Negative for chills and fever  Respiratory: Negative for cough and shortness of breath  Cardiovascular: Negative for chest pain  Gastrointestinal: Negative for constipation, diarrhea, nausea and vomiting  Neurological: Positive for numbness  Objective:      /74   Pulse 83   Ht 5' 8" (1 727 m) Comment: verbal  Wt 107 kg (235 lb)   BMI 35 73 kg/m²          Physical Exam  Vitals reviewed  Constitutional:       General: He is not in acute distress  Appearance: Normal appearance  He is well-developed  He is not ill-appearing  Cardiovascular:      Rate and Rhythm: Normal rate and regular rhythm  Pulses:           Dorsalis pedis pulses are 0 on the right side and 0 on the left side  Posterior tibial pulses are 1+ on the right side and 1+ on the left side  Comments: Decreased LE edema  Pulmonary:      Effort: Pulmonary effort is normal  No respiratory distress  Musculoskeletal:         General: Deformity present   No tenderness or signs of injury  Right foot: No foot drop  Left foot: No foot drop  Comments: Hammertoe deformity noted  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  Feet:      Right foot:      Skin integrity: Dry skin present  No ulcer, skin breakdown, erythema, warmth or callus  Left foot:      Skin integrity: Dry skin present  No ulcer, skin breakdown, erythema, warmth or callus  Skin:     General: Skin is warm  Capillary Refill: Capillary refill takes less than 2 seconds  Coloration: Skin is not cyanotic or mottled  Findings: No ecchymosis or erythema  Rash is not purpuric  Nails: There is no clubbing  Comments: Thick mycotic nails X 7 with discoloration and onycholysis  HPK X 2 on right submet 5 and left submet 3  He has class finding with previous toe amputation  Neurological:      General: No focal deficit present  Mental Status: He is alert and oriented to person, place, and time  Cranial Nerves: No cranial nerve deficit  Sensory: Sensory deficit present  Motor: No weakness  Psychiatric:         Mood and Affect: Mood normal          Behavior: Behavior normal          Thought Content:  Thought content normal          Judgment: Judgment normal

## 2021-08-10 DIAGNOSIS — I10 ESSENTIAL HYPERTENSION: Chronic | ICD-10-CM

## 2021-08-10 RX ORDER — CARVEDILOL 6.25 MG/1
6.25 TABLET ORAL 2 TIMES DAILY WITH MEALS
Qty: 180 TABLET | Refills: 1 | Status: SHIPPED | OUTPATIENT
Start: 2021-08-10 | End: 2022-02-03

## 2021-09-02 ENCOUNTER — CONSULT (OUTPATIENT)
Dept: SURGERY | Facility: CLINIC | Age: 61
End: 2021-09-02
Payer: MEDICARE

## 2021-09-02 ENCOUNTER — TRANSCRIBE ORDERS (OUTPATIENT)
Dept: SURGERY | Facility: CLINIC | Age: 61
End: 2021-09-02

## 2021-09-02 VITALS — BODY MASS INDEX: 34.07 KG/M2 | WEIGHT: 230 LBS | HEIGHT: 69 IN

## 2021-09-02 DIAGNOSIS — L98.9 SKIN LESION OF SCALP: Primary | ICD-10-CM

## 2021-09-02 PROCEDURE — 88305 TISSUE EXAM BY PATHOLOGIST: CPT | Performed by: PATHOLOGY

## 2021-09-02 PROCEDURE — 99202 OFFICE O/P NEW SF 15 MIN: CPT | Performed by: SURGERY

## 2021-09-02 PROCEDURE — 11310 SHAVE SKIN LESION 0.5 CM/<: CPT | Performed by: SURGERY

## 2021-09-02 NOTE — PROGRESS NOTES
Assessment/Plan:    Diagnoses and all orders for this visit:    Skin lesion of scalp     removal of superficial skin lesion of left temple  Local wound care instructions given  Will call with final pathology    Subjective:      Patient ID: Priscilla Potts is a 64 y o  male  Patient presents for evaluation of a skin lesion on his left temple  He has had the skin lesion for 5 to 6 months  Denies pain or size change  The following portions of the patient's history were reviewed and updated as appropriate:     He  has a past medical history of Chronic kidney disease, Diabetes mellitus ([facility] [address] ), GERD (gastroesophageal reflux disease), Hypercholesteremia, Hyperlipidemia, Hypertension, Infectious viral hepatitis, Neuropathy, Obesity, Osteomyelitis ([facility] [address] ), PVC's (premature ventricular contractions), and Stroke ([facility] [address] )  He  has a past surgical history that includes Rotator cuff repair (Right); Abdominal surgery; Other surgical history; Cholecystectomy; Toe amputation (Right, 10/28/2016); pr esophagogastroduodenoscopy transoral diagnostic (N/A, 9/27/2016); pr lap,cholecystectomy (N/A, 2/29/2016); Cystoscopy; and Colonoscopy  His family history includes Breast cancer in his sister; Heart disease in his father; Leukemia in his mother; Liver disease in his father and mother; Lung cancer in his mother; Multiple myeloma in his sister; Urolithiasis in his family  He  reports that he has never smoked  He has never used smokeless tobacco  He reports previous alcohol use  He reports that he does not use drugs    Current Outpatient Medications   Medication Sig Dispense Refill    aspirin (ECOTRIN LOW STRENGTH) 81 mg EC tablet Take 81 mg by mouth daily Resume on 8/14      atorvastatin (LIPITOR) 40 mg tablet Take 1 tablet (40 mg total) by mouth daily with dinner 90 tablet 1    atorvastatin (LIPITOR) 40 mg tablet Take 1 tablet (40 mg total) by mouth daily with dinner 90 tablet 1    BD Pen Needle Adina U/F 32G X 4 MM MISC USE TO INJECT INSULIN 4 TIMES DAILY 400 each 1    Blood Glucose Monitoring Suppl (True Metrix Meter) w/Device KIT Use to test blood sugars 3 times daily 1 kit 0    Blood Pressure Monitoring (BLOOD PRESSURE CUFF) MISC Use to check blood pressure before taking blood pressure medication and 1 hour after and follow instructions provided in discharge instructions based on the readings  1 each 0    calcium acetate (PHOSLO) capsule Take 1 capsule (667 mg total) by mouth 3 (three) times a day with meals (Patient not taking: Reported on 4/9/2021) 90 capsule 0    carvedilol (COREG) 6 25 mg tablet Take 1 tablet (6 25 mg total) by mouth 2 (two) times a day with meals 180 tablet 1    Cholecalciferol (Vitamin D3) 1 25 MG (21318 UT) CAPS TAKE 1 CAPSULE BY MOUTH ONE TIME PER WEEK 12 capsule 2    Continuous Blood Gluc  (DEXCOM G6 ) LANCE Use as directed for continuous glucose monitoring 1 Device 0    Continuous Blood Gluc Sensor (DEXCOM G6 SENSOR) MISC Use as directed for continuous glucose monitoring   Change every 10 days 1 each 11    Continuous Blood Gluc Transmit (DEXCOM G6 TRANSMITTER) MISC Use as directed for continuous glucose monitoring-Change every 3 months 1 each 3    glucose blood (True Metrix Blood Glucose Test) test strip Use 1 each 3 (three) times a day Use as instructed 100 each 5    Incontinence Supplies (MALE URINAL) MISC by Does not apply route daily 6 each 3    insulin glargine (Lantus) 100 units/mL subcutaneous injection Inject 14 Units under the skin daily 10 mL 1    insulin lispro (HumaLOG KwikPen) 100 units/mL injection pen Inject 4 units TID with meals plus scale 5 pen 2    Insulin Syringe-Needle U-100 (B-D INS SYRINGE 0 5CC/30GX1/2") 30G X 1/2" 0 5 ML MISC Inject under the skin 4 (four) times a day 360 each 1    nystatin (MYCOSTATIN) powder Apply topically 2 (two) times a day for 10 days 15 g 0    ONETOUCH DELICA LANCETS 75W MISC by Does not apply route 3 (three) times a day 270 each 1  torsemide (DEMADEX) 10 mg tablet Take 5 tablets (50 mg total) by mouth daily 150 tablet 0     No current facility-administered medications for this visit  He has No Known Allergies       Review of Systems   All other systems reviewed and are negative  Objective:      Ht 5' 9" (1 753 m)   Wt 104 kg (230 lb)   BMI 33 97 kg/m²          Physical Exam  Skin:     General: Skin is dry  Comments: Protuberant 3 mm lesion of the left temple  Risks and benefits for removal were discussed with he and his spouse in the agree to proceed         Shave lesion    Date/Time: 9/2/2021 2:51 PM  Performed by: Marieta Cushing, DO  Authorized by: Marieta Cushing, DO   Universal Protocol:  Consent: Verbal consent obtained  Risks and benefits: risks, benefits and alternatives were discussed  Consent given by: patient  Time out: Immediately prior to procedure a "time out" was called to verify the correct patient, procedure, equipment, support staff and site/side marked as required  Patient understanding: patient states understanding of the procedure being performed  Patient identity confirmed: verbally with patient      Number of Lesions: 1  Lesion 1:     Body area: head/neck    Head/neck location: forehead    Initial size (mm): 3    Final defect size (mm): 3    Malignancy: benign lesion      Comments:   Verbal consent was obtained from the patient  Alcohol was used to cleanse the area of the left temple  1% lidocaine with epinephrine was used to infiltrate skin and subcutaneous tissues  The protuberant lesion was excised at its base with a pair scissors  Hyfrecator was used for hemostasis  Sterile Band-Aid was applied    He tolerated well

## 2021-09-08 ENCOUNTER — APPOINTMENT (EMERGENCY)
Dept: RADIOLOGY | Facility: HOSPITAL | Age: 61
End: 2021-09-08
Payer: MEDICARE

## 2021-09-08 ENCOUNTER — HOSPITAL ENCOUNTER (EMERGENCY)
Facility: HOSPITAL | Age: 61
Discharge: LEFT BEFORE DISCHARGE | End: 2021-09-08
Attending: EMERGENCY MEDICINE
Payer: MEDICARE

## 2021-09-08 VITALS
DIASTOLIC BLOOD PRESSURE: 97 MMHG | TEMPERATURE: 98 F | RESPIRATION RATE: 20 BRPM | OXYGEN SATURATION: 98 % | HEART RATE: 72 BPM | SYSTOLIC BLOOD PRESSURE: 159 MMHG

## 2021-09-08 DIAGNOSIS — R07.9 CHEST PAIN: ICD-10-CM

## 2021-09-08 DIAGNOSIS — N18.6 ESRD ON DIALYSIS (HCC): Primary | ICD-10-CM

## 2021-09-08 DIAGNOSIS — K21.9 GASTROESOPHAGEAL REFLUX DISEASE WITHOUT ESOPHAGITIS: ICD-10-CM

## 2021-09-08 DIAGNOSIS — Z99.2 ESRD ON DIALYSIS (HCC): Primary | ICD-10-CM

## 2021-09-08 LAB
ANION GAP SERPL CALCULATED.3IONS-SCNC: 11 MMOL/L (ref 4–13)
ATRIAL RATE: 76 BPM
BASOPHILS # BLD AUTO: 0.02 THOUSANDS/ΜL (ref 0–0.1)
BASOPHILS NFR BLD AUTO: 0 % (ref 0–1)
BUN SERPL-MCNC: 71 MG/DL (ref 5–25)
CALCIUM SERPL-MCNC: 8.9 MG/DL (ref 8.3–10.1)
CHLORIDE SERPL-SCNC: 102 MMOL/L (ref 100–108)
CO2 SERPL-SCNC: 21 MMOL/L (ref 21–32)
CREAT SERPL-MCNC: 7.39 MG/DL (ref 0.6–1.3)
EOSINOPHIL # BLD AUTO: 0.13 THOUSAND/ΜL (ref 0–0.61)
EOSINOPHIL NFR BLD AUTO: 2 % (ref 0–6)
ERYTHROCYTE [DISTWIDTH] IN BLOOD BY AUTOMATED COUNT: 13.4 % (ref 11.6–15.1)
GFR SERPL CREATININE-BSD FRML MDRD: 7 ML/MIN/1.73SQ M
GLUCOSE SERPL-MCNC: 128 MG/DL (ref 65–140)
HCT VFR BLD AUTO: 34.5 % (ref 36.5–49.3)
HGB BLD-MCNC: 11.2 G/DL (ref 12–17)
IMM GRANULOCYTES # BLD AUTO: 0.06 THOUSAND/UL (ref 0–0.2)
IMM GRANULOCYTES NFR BLD AUTO: 1 % (ref 0–2)
LYMPHOCYTES # BLD AUTO: 1.35 THOUSANDS/ΜL (ref 0.6–4.47)
LYMPHOCYTES NFR BLD AUTO: 20 % (ref 14–44)
MCH RBC QN AUTO: 31.1 PG (ref 26.8–34.3)
MCHC RBC AUTO-ENTMCNC: 32.5 G/DL (ref 31.4–37.4)
MCV RBC AUTO: 96 FL (ref 82–98)
MONOCYTES # BLD AUTO: 0.63 THOUSAND/ΜL (ref 0.17–1.22)
MONOCYTES NFR BLD AUTO: 10 % (ref 4–12)
NEUTROPHILS # BLD AUTO: 4.43 THOUSANDS/ΜL (ref 1.85–7.62)
NEUTS SEG NFR BLD AUTO: 67 % (ref 43–75)
NRBC BLD AUTO-RTO: 0 /100 WBCS
P AXIS: 34 DEGREES
PLATELET # BLD AUTO: 128 THOUSANDS/UL (ref 149–390)
PMV BLD AUTO: 10.3 FL (ref 8.9–12.7)
POTASSIUM SERPL-SCNC: 5 MMOL/L (ref 3.5–5.3)
PR INTERVAL: 160 MS
QRS AXIS: 12 DEGREES
QRSD INTERVAL: 122 MS
QT INTERVAL: 390 MS
QTC INTERVAL: 427 MS
RBC # BLD AUTO: 3.6 MILLION/UL (ref 3.88–5.62)
SODIUM SERPL-SCNC: 134 MMOL/L (ref 136–145)
T WAVE AXIS: 8 DEGREES
TROPONIN I SERPL-MCNC: <0.02 NG/ML
VENTRICULAR RATE: 72 BPM
WBC # BLD AUTO: 6.62 THOUSAND/UL (ref 4.31–10.16)

## 2021-09-08 PROCEDURE — 71045 X-RAY EXAM CHEST 1 VIEW: CPT

## 2021-09-08 PROCEDURE — 93005 ELECTROCARDIOGRAM TRACING: CPT

## 2021-09-08 PROCEDURE — 84484 ASSAY OF TROPONIN QUANT: CPT | Performed by: EMERGENCY MEDICINE

## 2021-09-08 PROCEDURE — 99285 EMERGENCY DEPT VISIT HI MDM: CPT | Performed by: EMERGENCY MEDICINE

## 2021-09-08 PROCEDURE — 99285 EMERGENCY DEPT VISIT HI MDM: CPT

## 2021-09-08 PROCEDURE — 85025 COMPLETE CBC W/AUTO DIFF WBC: CPT | Performed by: EMERGENCY MEDICINE

## 2021-09-08 PROCEDURE — 99283 EMERGENCY DEPT VISIT LOW MDM: CPT | Performed by: INTERNAL MEDICINE

## 2021-09-08 PROCEDURE — 80048 BASIC METABOLIC PNL TOTAL CA: CPT | Performed by: EMERGENCY MEDICINE

## 2021-09-08 PROCEDURE — 93010 ELECTROCARDIOGRAM REPORT: CPT | Performed by: INTERNAL MEDICINE

## 2021-09-08 PROCEDURE — 36415 COLL VENOUS BLD VENIPUNCTURE: CPT | Performed by: EMERGENCY MEDICINE

## 2021-09-08 RX ORDER — PANTOPRAZOLE SODIUM 40 MG/1
40 TABLET, DELAYED RELEASE ORAL DAILY
Qty: 30 TABLET | Refills: 0 | Status: SHIPPED | OUTPATIENT
Start: 2021-09-08 | End: 2021-12-09

## 2021-09-08 RX ORDER — ACETAMINOPHEN 325 MG/1
975 TABLET ORAL EVERY 8 HOURS
Status: DISCONTINUED | OUTPATIENT
Start: 2021-09-08 | End: 2021-09-08 | Stop reason: HOSPADM

## 2021-09-08 RX ORDER — SODIUM CHLORIDE 9 MG/ML
3 INJECTION INTRAVENOUS
Status: DISCONTINUED | OUTPATIENT
Start: 2021-09-08 | End: 2021-09-08 | Stop reason: HOSPADM

## 2021-09-08 NOTE — CONSULTS
Consultation - Nephrology   Luis Yoo 64 y o  male MRN: [id number]  Unit/Bed#: FT 03 Encounter: [id number]    ASSESSMENT:  1  ESRD on HD (MWF @ [address] [place] [address]): missed dialysis today due to chest pain  - plan for outpatient dialysis tomorrow without urgent indication today  2  Access: left AVF  3  Hypertension: BP okay, continue home medications  4  Anemia: hgb at goal  5  Secondary Hyperparathyroidism of renal origin: renal diet, continue home medications  6  Weakness / Chest Pain: per primary team  - troponin negative    PLAN:  Plan for outpatient dialysis tomorrow  [facility] unable to get patient in for dialysis today    HISTORY OF PRESENT ILLNESS:  Requesting Physician: Lalito Gaviria MD  Reason for Consult: ESRD on HD    Luis Yoo is a 64y o  year old male who was admitted to [address] [place] [address] after presenting with weakness and chest pain  A renal consultation is requested today for assistance in the management of ESRD  Luis Yoo is a known ESRD patient who undergoes maintenance hemodialysis at [address] [place] [address] on MWF  Patient presented to dialysis today but with his complaint of not feeling well, weakness, and chest pain, they sent him to the ER to be dialyzed      PAST MEDICAL HISTORY:  Past Medical History:   Diagnosis Date    Chronic kidney disease     Diabetes mellitus ([facility] [address] )     GERD (gastroesophageal reflux disease)     Hypercholesteremia     Hyperlipidemia     Hypertension     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    Neuropathy     Obesity     Osteomyelitis ([facility] [address] )     last assessed 11/4/16    PVC's (premature ventricular contractions)     sees cardiology Dr Allyssa camargo    Stroke [facility])     last weeof July 2018 [address] [place] [address]       PAST SURGICAL HISTORY:  Past Surgical History:   Procedure Laterality Date    ABDOMINAL SURGERY      CHOLECYSTECTOMY      Percutaneous    COLONOSCOPY      CYSTOSCOPY      OTHER SURGICAL HISTORY      "stimulator to control bowel movements"    CT ESOPHAGOGASTRODUODENOSCOPY TRANSORAL DIAGNOSTIC N/A 9/27/2016    Procedure: ESOPHAGOGASTRODUODENOSCOPY (EGD); Surgeon: Rey Barney MD;  [facility]: [facility];   Service: Gastroenterology    CT LAP,CHOLECYSTECTOMY N/A 2/29/2016    Procedure: LAPAROSCOPIC CHOLECYSTECTOMY ;  Surgeon: Lori Bolanos DO;  Location[facility];  Service: General    ROTATOR CUFF REPAIR Right     TOE AMPUTATION Right 10/28/2016    Procedure: 3RD TOE AMPUTATION ;  Surgeon: Antonietta Grace DPM;  [facility];  Service:        ALLERGIES:  No Known Allergies    SOCIAL HISTORY:  Social History     Substance and Sexual Activity   Alcohol Use Not Currently     Social History     Substance and Sexual Activity   Drug Use No     Social History     Tobacco Use   Smoking Status Never Smoker   Smokeless Tobacco Never Used       FAMILY HISTORY:  Family History   Problem Relation Age of Onset    Leukemia Mother     Liver disease Mother     Lung cancer Mother         heavy smoker - 3 ppd    Heart disease Father     Liver disease Father     Multiple myeloma Sister     Breast cancer Sister     Urolithiasis Family     Alcohol abuse Neg Hx     Depression Neg Hx     Drug abuse Neg Hx     Substance Abuse Neg Hx     Mental illness Neg Hx        MEDICATIONS:    Current Facility-Administered Medications:     acetaminophen (TYLENOL) tablet 975 mg, 975 mg, Oral, Q8H, Chris Moss MD    Insert peripheral IV, , , Once **AND** sodium chloride (PF) 0 9 % injection 3 mL, 3 mL, Intravenous, Q1H PRN, Paula Roberto MD    Current Outpatient Medications:     aspirin (ECOTRIN LOW STRENGTH) 81 mg EC tablet, Take 81 mg by mouth daily Resume on 8/14, Disp: , Rfl:     atorvastatin (LIPITOR) 40 mg tablet, Take 1 tablet (40 mg total) by mouth daily with dinner, Disp: 90 tablet, Rfl: 1    atorvastatin (LIPITOR) 40 mg tablet, Take 1 tablet (40 mg total) by mouth daily with dinner, Disp: 90 tablet, Rfl: 1    BD Pen Needle Adina U/F 32G X 4 MM MISC, USE TO INJECT INSULIN 4 TIMES DAILY, Disp: 400 each, Rfl: 1    Blood Glucose Monitoring Suppl (True Metrix Meter) w/Device KIT, Use to test blood sugars 3 times daily, Disp: 1 kit, Rfl: 0    Blood Pressure Monitoring (BLOOD PRESSURE CUFF) MISC, Use to check blood pressure before taking blood pressure medication and 1 hour after and follow instructions provided in discharge instructions based on the readings  , Disp: 1 each, Rfl: 0    calcium acetate (PHOSLO) capsule, Take 1 capsule (667 mg total) by mouth 3 (three) times a day with meals (Patient not taking: Reported on 4/9/2021), Disp: 90 capsule, Rfl: 0    carvedilol (COREG) 6 25 mg tablet, Take 1 tablet (6 25 mg total) by mouth 2 (two) times a day with meals, Disp: 180 tablet, Rfl: 1    Cholecalciferol (Vitamin D3) 1 25 MG (91846 UT) CAPS, TAKE 1 CAPSULE BY MOUTH ONE TIME PER WEEK, Disp: 12 capsule, Rfl: 2    Continuous Blood Gluc  (DEXCOM G6 ) LANCE, Use as directed for continuous glucose monitoring, Disp: 1 Device, Rfl: 0    Continuous Blood Gluc Sensor (DEXCOM G6 SENSOR) MISC, Use as directed for continuous glucose monitoring   Change every 10 days, Disp: 1 each, Rfl: 11    Continuous Blood Gluc Transmit (DEXCOM G6 TRANSMITTER) MISC, Use as directed for continuous glucose monitoring-Change every 3 months, Disp: 1 each, Rfl: 3    glucose blood (True Metrix Blood Glucose Test) test strip, Use 1 each 3 (three) times a day Use as instructed, Disp: 100 each, Rfl: 5    Incontinence Supplies (MALE URINAL) MISC, by Does not apply route daily, Disp: 6 each, Rfl: 3    insulin glargine (Lantus) 100 units/mL subcutaneous injection, Inject 14 Units under the skin daily, Disp: 10 mL, Rfl: 1    insulin lispro (HumaLOG KwikPen) 100 units/mL injection pen, Inject 4 units TID with meals plus scale, Disp: 5 pen, Rfl: 2    Insulin Syringe-Needle U-100 (B-D INS SYRINGE 0 5CC/30GX1/2") 30G X 1/2" 0 5 ML MISC, Inject under the skin 4 (four) times a day, Disp: 360 each, Rfl: 1    nystatin (MYCOSTATIN) powder, Apply topically 2 (two) times a day for 10 days, Disp: 15 g, Rfl: 0    ONETOUCH DELICA LANCETS 49E MISC, by Does not apply route 3 (three) times a day, Disp: 270 each, Rfl: 1    pantoprazole (PROTONIX) 40 mg tablet, Take 1 tablet (40 mg total) by mouth daily, Disp: 30 tablet, Rfl: 0    torsemide (DEMADEX) 10 mg tablet, Take 5 tablets (50 mg total) by mouth daily, Disp: 150 tablet, Rfl: 0    REVIEW OF SYSTEMS:  A complete 10 point review of systems was performed and found to be negative unless otherwise noted below or in the HPI  General: No fevers, chills  Cardiovascular: No chest pain, shortness of breath, palpitations, leg edema  Respiratory: No cough, sputum production, shortness of breath  Gastrointestinal: No nausea, vomiting, abdominal pain, diarrhea  Genitourinary: No hematuria  PHYSICAL EXAM:  Current Weight:    First Weight:    Vitals:    09/08/21 1044 09/08/21 1045 09/08/21 1200   BP:  145/69 159/97   BP Location:   Right arm   Pulse: 72 72 72   Resp: 20 20 20   Temp: 98 °F (36 7 °C)     SpO2: 98%  98%     No intake or output data in the 24 hours ending 09/08/21 1433  General: NAD  Skin: no rash  Eyes: anicteric  ENMT: mm moist  Neck: no masses  Respiratory: CTAB  CVS: RRR  Extremities: no edema  GI: soft nt nd  Neuro: alert awake  Psych: mood and affect appropriate     Lab Results:   Results from last 7 days   Lab Units 09/08/21  1108   WBC Thousand/uL 6 62   HEMOGLOBIN g/dL 11 2*   HEMATOCRIT % 34 5*   PLATELETS Thousands/uL 128*   POTASSIUM mmol/L 5 0   CHLORIDE mmol/L 102   CO2 mmol/L 21   BUN mg/dL 71*   CREATININE mg/dL 7 39*   CALCIUM mg/dL 8 9     Lab Results   Component Value Date    CALCIUM 8 9 09/08/2021    PHOS 5 9 (H) 01/02/2021     I have personally reviewed the blood work as stated above and in my note

## 2021-09-08 NOTE — QUICK NOTE
Asked to admit patient by ED physician  Upon arrival to the room, patient and wife are asking to be discharged  Patient states he still has pain in his chest but it is reproducible and "not that bad" and wife states he always gets heartburn because of his poor diet  Patient had a negative stress test in May 2021 and wife states he has had 2 negative cardiac catheterizations thus far in evaluation for renal transplant  Patient and patient's wife insist on leaving due to urgent need for HD as per their nephrologist  Nephrology physician also evaluated the patient and offered HD tomorrow as he does not urgently need it at this time  Patient and family still preferred to go home so nephrology arranged for outpatient HD tomorrow at their regular HD center  I offered to admit to at least run serial troponins but patient and wife declined  I sent a prescription for PPI to patient's pharmacy as they requested  Patient and wife were thankful for our help

## 2021-09-08 NOTE — Clinical Note
Case was discussed with Dr Yolanda Sanchez and the patient's admission status was agreed to be Admission Status: observation status to the service of Dr Yolanda Sanchez   However, you decided that you did not want to stay and requested discharge after risk/benefit  discussion  Sams Spruce

## 2021-09-08 NOTE — DISCHARGE INSTRUCTIONS
You were admitted to the hospital but decided not to stay  We discussed the limitations of ER testings and you understand that I cannot definitively say that your chest pain was not related to your heart  You understand that there is a possibility that your symptoms could progress resulting in worsening disease, disability and/or death  You can come back at any time should you change your mind  Please follow up with your Pcp and [facility] doctors

## 2021-09-17 NOTE — ED PROVIDER NOTES
History  Chief Complaint   Patient presents with    Chest Pain     patient told to come to ED prior to dialysis for chest pain that started this morning  Patient bruce any other symptoms at this time,       63 yo male with ESRD, HTN, HLD, DM, GERD, CVA p/w retrosternal non-radiating chest pain noted just prior to HD treatment today  Per family pt voluntarily ended HD early during last session (received two hours only) and is subsequently miore SOB than usual   Denies exertional component of chest pain, but does report associated nausea, no vomiting/diaphoresis/syncope/near syncope or any other associated symptoms  Denies numbness/tingling/weakness  Pain 6/10, no exacerbation or alleviating factors  History provided by:  Medical records, patient and relative   used: No    Chest Pain  Pain location:  Substernal area  Pain quality: aching    Pain radiates to:  Does not radiate  Pain radiates to the back: no    Pain severity:  Moderate  Onset quality:  Sudden  Duration:  1 hour  Timing:  Constant  Progression:  Unchanged  Chronicity:  New  Context: at rest and stress    Context: not breathing, not eating, no movement and no trauma    Relieved by:  Nothing  Worsened by:  Nothing tried  Ineffective treatments:  None tried  Associated symptoms: nausea, orthopnea and shortness of breath    Associated symptoms: no abdominal pain, no anorexia, no back pain, no cough, no diaphoresis, no dysphagia, no fever, no numbness, not vomiting and no weakness    Risk factors: high cholesterol, hypertension and male sex    Risk factors: no smoking        Prior to Admission Medications   Prescriptions Last Dose Informant Patient Reported? Taking?    BD Pen Needle Adina U/F 32G X 4 MM MISC   No No   Sig: USE TO INJECT INSULIN 4 TIMES DAILY   Blood Glucose Monitoring Suppl (True Metrix Meter) w/Device KIT   No No   Sig: Use to test blood sugars 3 times daily   Blood Pressure Monitoring (BLOOD PRESSURE CUFF) MISC Spouse/Significant Other No No   Sig: Use to check blood pressure before taking blood pressure medication and 1 hour after and follow instructions provided in discharge instructions based on the readings  Cholecalciferol (Vitamin D3) 1 25 MG (88995 UT) CAPS  Spouse/Significant Other No No   Sig: TAKE 1 CAPSULE BY MOUTH ONE TIME PER WEEK   Continuous Blood Gluc  (DEXCOM G6 ) LANCE  Spouse/Significant Other No No   Sig: Use as directed for continuous glucose monitoring   Continuous Blood Gluc Sensor (DEXCOM G6 SENSOR) MISC  Spouse/Significant Other No No   Sig: Use as directed for continuous glucose monitoring   Change every 10 days   Continuous Blood Gluc Transmit (DEXCOM G6 TRANSMITTER) MISC  Spouse/Significant Other No No   Sig: Use as directed for continuous glucose monitoring-Change every 3 months   Incontinence Supplies (MALE URINAL) MISC  Spouse/Significant Other No No   Sig: by Does not apply route daily   Insulin Syringe-Needle U-100 (B-D INS SYRINGE 0 5CC/30GX1/2") 30G X 1/2" 0 5 ML MISC  Spouse/Significant Other No No   Sig: Inject under the skin 4 (four) times a day   ONETOUCH DELICA LANCETS 52N MISC  Spouse/Significant Other No No   Sig: by Does not apply route 3 (three) times a day   aspirin (ECOTRIN LOW STRENGTH) 81 mg EC tablet  Spouse/Significant Other Yes No   Sig: Take 81 mg by mouth daily Resume on 8/14   atorvastatin (LIPITOR) 40 mg tablet   No No   Sig: Take 1 tablet (40 mg total) by mouth daily with dinner   atorvastatin (LIPITOR) 40 mg tablet   No No   Sig: Take 1 tablet (40 mg total) by mouth daily with dinner   calcium acetate (PHOSLO) capsule   No No   Sig: Take 1 capsule (667 mg total) by mouth 3 (three) times a day with meals   Patient not taking: Reported on 4/9/2021   carvedilol (COREG) 6 25 mg tablet   No No   Sig: Take 1 tablet (6 25 mg total) by mouth 2 (two) times a day with meals   glucose blood (True Metrix Blood Glucose Test) test strip  Spouse/Significant Other No No   Sig: Use 1 each 3 (three) times a day Use as instructed   insulin glargine (Lantus) 100 units/mL subcutaneous injection   No No   Sig: Inject 14 Units under the skin daily   insulin lispro (HumaLOG KwikPen) 100 units/mL injection pen  Spouse/Significant Other No No   Sig: Inject 4 units TID with meals plus scale   nystatin (MYCOSTATIN) powder   No No   Sig: Apply topically 2 (two) times a day for 10 days   torsemide (DEMADEX) 10 mg tablet   No No   Sig: Take 5 tablets (50 mg total) by mouth daily      Facility-Administered Medications: None       Past Medical History:   Diagnosis Date    Chronic kidney disease     [facility] [address] )     GERD (gastroesophageal reflux disease)     Hypercholesteremia     Hyperlipidemia     Hypertension     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    Neuropathy     Obesity     Osteomyelitis ([facility] [address] )     last assessed 11/4/16    PVC's (premature ventricular contractions)     sees cardiology Dr Terell camargo    [facility])     last weeof July 2018 [facility]       Past Surgical History:   Procedure Laterality Date    ABDOMINAL SURGERY      CHOLECYSTECTOMY      Percutaneous    COLONOSCOPY      CYSTOSCOPY      OTHER SURGICAL HISTORY      "stimulator to control bowel movements"    NM ESOPHAGOGASTRODUODENOSCOPY TRANSORAL DIAGNOSTIC N/A 9/27/2016    Procedure: ESOPHAGOGASTRODUODENOSCOPY (EGD); Surgeon: Whitney Solomon MD;  [facility];   Service: Gastroenterology    NM LAP,CHOLECYSTECTOMY N/A 2/29/2016    Procedure: LAPAROSCOPIC CHOLECYSTECTOMY ;  Surgeon: Mary Grace Lara DO;  [facility];  Service: General    ROTATOR CUFF REPAIR Right     TOE AMPUTATION Right 10/28/2016    Procedure: 3RD TOE AMPUTATION ;  Surgeon: Mara Mcginnis DPM;  [facility];  Service:        Family History   Problem Relation Age of Onset    Leukemia Mother     Liver disease Mother     Lung cancer Mother         heavy smoker - 3 ppd    Heart disease Father     Liver disease Father     Multiple myeloma Sister     Breast cancer Sister     Urolithiasis Family     Alcohol abuse Neg Hx     Depression Neg Hx     Drug abuse Neg Hx     Substance Abuse Neg Hx     Mental illness Neg Hx      I have reviewed and agree with the history as documented  E-Cigarette/Vaping    E-Cigarette Use Never User      E-Cigarette/Vaping Substances    Nicotine No     THC No     CBD No     Flavoring No     Other No     Unknown No      Social History     Tobacco Use    Smoking status: Never Smoker    Smokeless tobacco: Never Used   Vaping Use    Vaping Use: Never used   Substance Use Topics    Alcohol use: Not Currently    Drug use: No       Review of Systems   Constitutional: Negative for diaphoresis and fever  HENT: Negative for rhinorrhea, tinnitus and trouble swallowing  Respiratory: Positive for shortness of breath  Negative for cough  Cardiovascular: Positive for chest pain and orthopnea  Gastrointestinal: Positive for nausea  Negative for abdominal pain, anorexia and vomiting  Genitourinary: Negative for frequency  Musculoskeletal: Negative for back pain  Neurological: Negative for weakness and numbness  All other systems reviewed and are negative  Physical Exam  Physical Exam  Vitals and nursing note reviewed  Constitutional:       Appearance: He is well-developed  He is not diaphoretic  HENT:      Head: Normocephalic and atraumatic  Eyes:      Conjunctiva/sclera: Conjunctivae normal    Cardiovascular:      Rate and Rhythm: Normal rate and regular rhythm  Heart sounds: Normal heart sounds  No murmur heard  Pulmonary:      Effort: Pulmonary effort is normal  No respiratory distress  Breath sounds: Normal breath sounds  Abdominal:      Palpations: Abdomen is soft  Musculoskeletal:         General: No deformity  Normal range of motion  Cervical back: Normal range of motion        Right lower leg: No edema  Left lower leg: No edema  Skin:     General: Skin is warm and dry  Neurological:      Mental Status: He is alert and oriented to person, place, and time  Psychiatric:         Behavior: Behavior normal          Thought Content:  Thought content normal          Judgment: Judgment normal          Vital Signs  ED Triage Vitals   Temperature Pulse Respirations Blood Pressure SpO2   09/08/21 1044 09/08/21 1044 09/08/21 1044 09/08/21 1045 09/08/21 1044   98 °F (36 7 °C) 72 20 145/69 98 %      Temp src Heart Rate Source Patient Position - Orthostatic VS BP Location FiO2 (%)   -- 09/08/21 1045 09/08/21 1200 09/08/21 1200 --    Monitor Lying Right arm       Pain Score       09/08/21 1044       2           Vitals:    09/08/21 1044 09/08/21 1045 09/08/21 1200   BP:  145/69 159/97   Pulse: 72 72 72   Patient Position - Orthostatic VS:   Lying         Visual Acuity      ED Medications  Medications - No data to display    Diagnostic Studies  Results Reviewed     Procedure Component Value Units Date/Time    Troponin I [[id number]]  (Normal) Collected: 09/08/21 1108    Lab Status: Final result Specimen: Blood from Arm, Right Updated: 09/08/21 1150     Troponin I <0 02 ng/mL     Basic metabolic panel [[id number]]  (Abnormal) Collected: 09/08/21 1108    Lab Status: Final result Specimen: Blood from Arm, Right Updated: 09/08/21 1140     Sodium 134 mmol/L      Potassium 5 0 mmol/L      Chloride 102 mmol/L      CO2 21 mmol/L      ANION GAP 11 mmol/L      BUN 71 mg/dL      Creatinine 7 39 mg/dL      Glucose 128 mg/dL      Calcium 8 9 mg/dL      eGFR 7 ml/min/1 73sq m     Narrative:      Romel guidelines for Chronic Kidney Disease (CKD):     Stage 1 with normal or high GFR (GFR > 90 mL/min/1 73 square meters)    Stage 2 Mild CKD (GFR = 60-89 mL/min/1 73 square meters)    Stage 3A Moderate CKD (GFR = 45-59 mL/min/1 73 square meters)    Stage 3B Moderate CKD (GFR = 30-44 mL/min/1 73 square meters)    Stage 4 Severe CKD (GFR = 15-29 mL/min/1 73 square meters)    Stage 5 End Stage CKD (GFR <15 mL/min/1 73 square meters)  Note: GFR calculation is accurate only with a steady state creatinine    CBC and differential [[id number]]  (Abnormal) Collected: 09/08/21 1108    Lab Status: Final result Specimen: Blood from Arm, Right Updated: 09/08/21 1129     WBC 6 62 Thousand/uL      RBC 3 60 Million/uL      Hemoglobin 11 2 g/dL      Hematocrit 34 5 %      MCV 96 fL      MCH 31 1 pg      MCHC 32 5 g/dL      RDW 13 4 %      MPV 10 3 fL      Platelets 410 Thousands/uL      nRBC 0 /100 WBCs      Neutrophils Relative 67 %      Immat GRANS % 1 %      Lymphocytes Relative 20 %      Monocytes Relative 10 %      Eosinophils Relative 2 %      Basophils Relative 0 %      Neutrophils Absolute 4 43 Thousands/µL      Immature Grans Absolute 0 06 Thousand/uL      Lymphocytes Absolute 1 35 Thousands/µL      Monocytes Absolute 0 63 Thousand/µL      Eosinophils Absolute 0 13 Thousand/µL      Basophils Absolute 0 02 Thousands/µL                  X-ray chest 1 view portable   Final Result by Luciano Medina MD (09/08 1119)   No acute cardiopulmonary disease        Improved            Workstation performed: [id number]                    Procedures  ECG 12 Lead Documentation Only    Date/Time: 9/1/2021 10:41 AM  Performed by: Tejal Benitez MD  Authorized by: Tejal Benitez MD     Indications / Diagnosis:  Chest pain  ECG reviewed by me, the ED Provider: yes    Patient location:  ED  Previous ECG:     Previous ECG:  Compared to current    Comparison ECG info:  7/14/2021     Similarity:  Changes noted    Comparison to cardiac monitor: Yes    Interpretation:     Interpretation: abnormal    Rate:     ECG rate:  72BPM    ECG rate assessment: normal    Rhythm:     Rhythm: sinus rhythm    Ectopy:     Ectopy: none    QRS:     QRS axis:  Normal  Conduction:     Conduction: abnormal      Abnormal conduction: complete RBBB    ST segments:     ST segments:  Non-specific  Comments:        Normal sinus rhythm  Right bundle branch block  T wave abnormality, consider inferior ischemia  Abnormal ECG  When compared with ECG of 14-JUL-2021 13:11,  QRS duration has decreased  Nonspecific T wave abnormality no longer evident in Anterior leads                   ED Course  ED Course as of Sep 17 1600   Wed Sep 08, 2021   1206 wnl   Troponin I: <0 02   1206 Mild anemia and thrombocytopenia near baseline     CBC and differential(!)   1207 IMPRESSION:  No acute cardiopulmonary disease      Improved           Workstation performed: [id number]   X-ray chest 1 view portable   1324 Normal sinus rhythm  Right bundle branch block  T wave abnormality, consider inferior ischemia  Abnormal ECG  When compared with ECG of 14-JUL-2021 13:11,  QRS duration has decreased  Nonspecific T wave abnormality no longer evident in Anterior leads  Confirmed by Nasima Hilliard ([id number]) on 9/8/2021 1:07:24 PM   ECG 12 lead                                           MDM  Number of Diagnoses or Management Options  Chest pain  ESRD on dialysis [facility])  Diagnosis management comments: Pt admitted for chest pain, however ultimately declined to stay, he understands the risks of leaving and ultimately agreed to sign AMA form  He understands he can return at any time should he change his mind and wish to continue his evaluation           Amount and/or Complexity of Data Reviewed  Clinical lab tests: ordered and reviewed  Tests in the radiology section of CPT®: ordered and reviewed  Tests in the medicine section of CPT®: ordered and reviewed  Review and summarize past medical records: yes  Discuss the patient with other providers: yes  Independent visualization of images, tracings, or specimens: yes    Risk of Complications, Morbidity, and/or Mortality  Presenting problems: moderate  Diagnostic procedures: moderate  Management options: moderate    Patient Progress  Patient progress: stable      Disposition  Final diagnoses:   ESRD on dialysis [facility])   Chest pain     Time reflects when diagnosis was documented in both MDM as applicable and the Disposition within this note     Time User Action Codes Description Comment    9/8/2021  1:23 PM Mulflur, Sindhu Add [N18 6,  Z99 2] ESRD on dialysis ([facility] [address] )     9/8/2021  1:27 PM Mulflur, Clemencia Rings Add [R07 9] Chest pain     9/8/2021  2:15 PM Roise Petersburg Add [K21 9] Gastroesophageal reflux disease without esophagitis       ED Disposition     ED Disposition Condition Date/Time Comment    AMA  Wed Sep 8, 2021  2:52 PM Case was discussed with Dr Eugene Marquez and the patient's admission status was agreed to be Admission Status: observation status to the service of Dr Eugene Marquez   However, you decided that you did not want to stay and requested discharge after risk/benefit  discussion           Follow-up Information     Follow up With Specialties Details Why Contact Info Additional [address], DO Internal Medicine Schedule an appointment as soon as possible for a visit   [phone]   [place] [id number] [phone]       [place] [address] Emergency Department Emergency Medicine  As needed, If symptoms worsen or if you change your mind [address] [facility] [address] [place] [address] Emergency Department,  [address], [place], [address]          Discharge Medication List as of 9/8/2021  2:52 PM      START taking these medications    Details   pantoprazole (PROTONIX) 40 mg tablet Take 1 tablet (40 mg total) by mouth daily, Starting Wed 9/8/2021, Normal         CONTINUE these medications which have NOT CHANGED    Details   aspirin (ECOTRIN LOW STRENGTH) 81 mg EC tablet Take 81 mg by mouth daily Resume on 8/14, Historical Med      !! atorvastatin (LIPITOR) 40 mg tablet Take 1 tablet (40 mg total) by mouth daily with dinner, Starting Fri 4/9/2021, Phone In !! atorvastatin (LIPITOR) 40 mg tablet Take 1 tablet (40 mg total) by mouth daily with dinner, Starting Mon 7/5/2021, Normal      BD Pen Needle Adina U/F 32G X 4 MM MISC USE TO INJECT INSULIN 4 TIMES DAILY, Normal      Blood Glucose Monitoring Suppl (True Metrix Meter) w/Device KIT Use to test blood sugars 3 times daily, Normal      Blood Pressure Monitoring (BLOOD PRESSURE CUFF) MISC Use to check blood pressure before taking blood pressure medication and 1 hour after and follow instructions provided in discharge instructions based on the readings  , Print      calcium acetate (PHOSLO) capsule Take 1 capsule (667 mg total) by mouth 3 (three) times a day with meals, Starting Fri 10/9/2020, Until Thu 2/25/2021, Normal      carvedilol (COREG) 6 25 mg tablet Take 1 tablet (6 25 mg total) by mouth 2 (two) times a day with meals, Starting Tue 8/10/2021, Normal      Cholecalciferol (Vitamin D3) 1 25 MG (65621 UT) CAPS TAKE 1 CAPSULE BY MOUTH ONE TIME PER WEEK, Normal      Continuous Blood Gluc  (DEXCOM G6 ) LANCE Use as directed for continuous glucose monitoring, Normal      Continuous Blood Gluc Sensor (DEXCOM G6 SENSOR) MISC Use as directed for continuous glucose monitoring   Change every 10 days, Normal      Continuous Blood Gluc Transmit (DEXCOM G6 TRANSMITTER) MISC Use as directed for continuous glucose monitoring-Change every 3 months, Normal      glucose blood (True Metrix Blood Glucose Test) test strip Use 1 each 3 (three) times a day Use as instructed, Starting Thu 2/25/2021, Normal      Incontinence Supplies (MALE URINAL) MISC by Does not apply route daily, Starting Mon 9/24/2018, Print      insulin glargine (Lantus) 100 units/mL subcutaneous injection Inject 14 Units under the skin daily, Starting Tue 6/8/2021, Normal      insulin lispro (HumaLOG KwikPen) 100 units/mL injection pen Inject 4 units TID with meals plus scale, Normal      Insulin Syringe-Needle U-100 (B-D INS SYRINGE 0 5CC/30GX1/2") 30G X 1/2" 0 5 ML MISC Inject under the skin 4 (four) times a day, Starting Fri 6/5/2020, Normal      nystatin (MYCOSTATIN) powder Apply topically 2 (two) times a day for 10 days, Starting Wed 1/6/2021, Until Sat 1/16/2021, Normal      ONETOUCH DELICA LANCETS 24N MISC by Does not apply route 3 (three) times a day, Starting Tue 11/27/2018, Normal      torsemide (DEMADEX) 10 mg tablet Take 5 tablets (50 mg total) by mouth daily, Starting Sat 10/10/2020, Until Wed 7/14/2021, Normal       !! - Potential duplicate medications found  Please discuss with provider  No discharge procedures on file      PDMP Review       Value Time User    PDMP Reviewed  Yes 12/30/2020 10:40 PM Luis Waller MD          ED Provider  Electronically Signed by           Reyes Cerise, MD  09/19/21 [id number]

## 2021-09-21 ENCOUNTER — OFFICE VISIT (OUTPATIENT)
Dept: CARDIOLOGY CLINIC | Facility: CLINIC | Age: 61
End: 2021-09-21
Payer: MEDICARE

## 2021-09-21 VITALS
WEIGHT: 238 LBS | HEART RATE: 82 BPM | HEIGHT: 69 IN | SYSTOLIC BLOOD PRESSURE: 120 MMHG | DIASTOLIC BLOOD PRESSURE: 78 MMHG | BODY MASS INDEX: 35.25 KG/M2 | TEMPERATURE: 98 F | OXYGEN SATURATION: 98 %

## 2021-09-21 DIAGNOSIS — I69.30 HISTORY OF ISCHEMIC CEREBROVASCULAR ACCIDENT (CVA) WITH RESIDUAL DEFICIT: ICD-10-CM

## 2021-09-21 DIAGNOSIS — R06.02 SHORTNESS OF BREATH: ICD-10-CM

## 2021-09-21 DIAGNOSIS — I63.312 CEREBROVASCULAR ACCIDENT (CVA) DUE TO THROMBOSIS OF LEFT MIDDLE CEREBRAL ARTERY (HCC): ICD-10-CM

## 2021-09-21 DIAGNOSIS — Z99.2 ESRD ON DIALYSIS (HCC): ICD-10-CM

## 2021-09-21 DIAGNOSIS — G45.9 TIA (TRANSIENT ISCHEMIC ATTACK): ICD-10-CM

## 2021-09-21 DIAGNOSIS — N18.6 ESRD ON DIALYSIS (HCC): ICD-10-CM

## 2021-09-21 DIAGNOSIS — Z79.4 TYPE 2 DIABETES MELLITUS WITH HYPERGLYCEMIA, WITH LONG-TERM CURRENT USE OF INSULIN (HCC): ICD-10-CM

## 2021-09-21 DIAGNOSIS — I12.0 BENIGN HYPERTENSION WITH CHRONIC KIDNEY DISEASE, STAGE V (HCC): ICD-10-CM

## 2021-09-21 DIAGNOSIS — E11.65 TYPE 2 DIABETES MELLITUS WITH HYPERGLYCEMIA, WITH LONG-TERM CURRENT USE OF INSULIN (HCC): ICD-10-CM

## 2021-09-21 DIAGNOSIS — N18.5 BENIGN HYPERTENSION WITH CHRONIC KIDNEY DISEASE, STAGE V (HCC): ICD-10-CM

## 2021-09-21 PROCEDURE — 99214 OFFICE O/P EST MOD 30 MIN: CPT | Performed by: INTERNAL MEDICINE

## 2021-09-21 NOTE — PROGRESS NOTES
Consultation - Cardiology Office  [facility]  Alexei Cabrera 64 y o  male MRN: [id number]  : 1960  Unit/Bed#:  Encounter: [id number]      Assessment:     1  Shortness of breath    2  Benign hypertension with chronic kidney disease, stage V ([facility] )    3  ESRD on dialysis ([facility] )    4  Cerebrovascular accident (CVA) due to thrombosis of left middle cerebral artery ([facility] )    5  Type 2 diabetes mellitus with hyperglycemia, with long-term current use of insulin ([facility])    6  History of ischemic cerebrovascular accident (CVA) with residual deficit    7  TIA (transient ischemic attack)        Discussion summary and Plan:    1  History of end-stage renal disease on dialysis  Patient get dialysis on   He is looking to have transplant     Currently doing well    2  Exertion shortness of breath  Mild exertional shortness of breath not changed  He has only mild-to-moderate valvular disease  His symptoms are actually better he is trying hard to lose weight encouraged him to have dietary changes  His wife also encouraging him  3  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  Patient on medical Rx he has recovered lot of ambulatory function  Some memory issues no other problems      4  Dyslipidemia  Continue high intensity statins LFTs has been acceptable    5  Essential hypertension  Patient blood pressure is acceptable currently he is taking carvedilol 6 25 twice a day along with Demadex  Blood pressure has been stable  6    Obesity with BMI around 35  Advised to lose weight  7  Incontinent  Could be due to dysautonomia or neurogenic bladder  May need Botox injections    Continue current Rx strongly advised him to lose weight  And continue current Rx  Thank you for your consultation  If you have any question please call me at [phone]    Counseling :  A description of the counseling  Goals and Barriers    Patient's ability to self care: Yes  Medication side effect reviewed with patient in detail and all their questions answered to their satisfaction  Primary Care Physician : Jaquelin Clarke,       HPI :     Corinne Hammonds is a 64y o  year old male who was was initially seen by us many years ago was recently [facility] with near syncopal episode  Patient has past medical significant for chronic kidney disease, diabetes mellitus, diarrhea, history of stroke who presents with unresponsive episode  Patient was going to flea market in his car with his family, he was not driving  Family noted him to be very sweaty and pale, and became unresponsive  Patient was brought to the emergency department and noted to have blood glucose of 40  Patient is not doing well  He denies any new complaints  His echo from [facility] reviewed  During workup he was found to have CKD stage 3/4  He now follows up with [facility]  He is doing well  His kidney function has stabilized  He does occasionally get short of breath when he walks  After his stroke is not that active  He is having lot of problem with his urine incontinent  He is scheduled to have a procedure done by urology  He may need clearance for that procedure  minute     09/21/2021  Above reviewed  Patient came for follow-up he is doing well from cardiac point of view  He had now AV fistula in the left arm and he get dialysis through that  He has no problem getting dialysis  He is on dialysis on Monday Wednesday Friday  He has medical history significant for hypertension, diabetes mellitus, history of stroke and abnormal EKG  He has a nonischemic nuclear in May of 2021  He is trying hard to lose weight so that he can be on the list   He has no nausea no vomiting no fever no chills  He has some itchiness and has some open wound he is using cream   No other cardiovascular issues today vitals has been stable  Labs reviewed          Review of Systems Constitutional: Negative for activity change, chills, diaphoresis, fever and unexpected weight change  HENT: Negative for congestion  Eyes: Negative for discharge and redness  Respiratory: Negative for cough, chest tightness, shortness of breath and wheezing  Cardiovascular: Negative  Negative for chest pain, palpitations and leg swelling  Gastrointestinal: Negative for abdominal pain, diarrhea and nausea  Endocrine: Negative  Genitourinary: Negative for decreased urine volume and urgency  End-stage renal disease on dialysis   Musculoskeletal: Negative  Negative for arthralgias, back pain and gait problem  Skin: Negative for rash and wound  Allergic/Immunologic: Negative  Neurological: Negative for dizziness, seizures, syncope, weakness, light-headedness and headaches  Hematological: Negative  Psychiatric/Behavioral: Negative for agitation and confusion  The patient is not nervous/anxious  Historical Information   Past Medical History:   Diagnosis Date    Chronic kidney disease     Diabetes mellitus ([facility] )     GERD (gastroesophageal reflux disease)     Hypercholesteremia     Hyperlipidemia     Hypertension     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    Neuropathy     Obesity     [facility]     last assessed 11/4/16    PVC's (premature ventricular contractions)     sees cardiology Dr Elvia Chandra [facility])     last weeof July 2018 [address]     Past Surgical History:   Procedure Laterality Date    ABDOMINAL SURGERY      CHOLECYSTECTOMY      Percutaneous    COLONOSCOPY      CYSTOSCOPY      OTHER SURGICAL HISTORY      "stimulator to control bowel movements"    CO ESOPHAGOGASTRODUODENOSCOPY TRANSORAL DIAGNOSTIC N/A 9/27/2016    Procedure: ESOPHAGOGASTRODUODENOSCOPY (EGD); Surgeon: Ana Cristina Motta MD;  [facility]: [facility];   Service: Gastroenterology    CO LAP,CHOLECYSTECTOMY N/A 2/29/2016    Procedure: Javed Cole CHOLECYSTECTOMY ;  Surgeon: Desire Coronado DO;  Location: [facility];  Service: General    ROTATOR CUFF REPAIR Right     TOE AMPUTATION Right 10/28/2016    Procedure: 3RD TOE AMPUTATION ;  Surgeon: Nael Sanchez DPM;  Location[facility];  Service:      Social History     Substance and Sexual Activity   Alcohol Use Not Currently     Social History     Substance and Sexual Activity   Drug Use No     Social History     Tobacco Use   Smoking Status Never Smoker   Smokeless Tobacco Never Used     Family History:   Family History   Problem Relation Age of Onset    Leukemia Mother     Liver disease Mother     Lung cancer Mother         heavy smoker - 3 ppd    Heart disease Father     Liver disease Father     Multiple myeloma Sister     Breast cancer Sister     Urolithiasis Family     Alcohol abuse Neg Hx     Depression Neg Hx     Drug abuse Neg Hx     Substance Abuse Neg Hx     Mental illness Neg Hx        Meds/Allergies     No Known Allergies    Current Outpatient Medications:     aspirin (ECOTRIN LOW STRENGTH) 81 mg EC tablet, Take 81 mg by mouth daily Resume on 8/14, Disp: , Rfl:     atorvastatin (LIPITOR) 40 mg tablet, Take 1 tablet (40 mg total) by mouth daily with dinner, Disp: 90 tablet, Rfl: 1    BD Pen Needle Adina U/F 32G X 4 MM MISC, USE TO INJECT INSULIN 4 TIMES DAILY, Disp: 400 each, Rfl: 1    Blood Glucose Monitoring Suppl (True Metrix Meter) w/Device KIT, Use to test blood sugars 3 times daily, Disp: 1 kit, Rfl: 0    Blood Pressure Monitoring (BLOOD PRESSURE CUFF) MISC, Use to check blood pressure before taking blood pressure medication and 1 hour after and follow instructions provided in discharge instructions based on the readings  , Disp: 1 each, Rfl: 0    carvedilol (COREG) 6 25 mg tablet, Take 1 tablet (6 25 mg total) by mouth 2 (two) times a day with meals, Disp: 180 tablet, Rfl: 1    Cholecalciferol (Vitamin D3) 1 25 MG (15514 UT) CAPS, TAKE 1 CAPSULE BY MOUTH ONE TIME PER WEEK, Disp: 12 capsule, Rfl: 2    Continuous Blood Gluc  (DEXCOM G6 ) LANCE, Use as directed for continuous glucose monitoring, Disp: 1 Device, Rfl: 0    Continuous Blood Gluc Sensor (DEXCOM G6 SENSOR) MISC, Use as directed for continuous glucose monitoring  Change every 10 days, Disp: 1 each, Rfl: 11    Continuous Blood Gluc Transmit (DEXCOM G6 TRANSMITTER) MISC, Use as directed for continuous glucose monitoring-Change every 3 months, Disp: 1 each, Rfl: 3    glucose blood (True Metrix Blood Glucose Test) test strip, Use 1 each 3 (three) times a day Use as instructed, Disp: 100 each, Rfl: 5    Incontinence Supplies (MALE URINAL) MISC, by Does not apply route daily, Disp: 6 each, Rfl: 3    insulin glargine (Lantus) 100 units/mL subcutaneous injection, Inject 14 Units under the skin daily, Disp: 10 mL, Rfl: 1    insulin lispro (HumaLOG KwikPen) 100 units/mL injection pen, Inject 4 units TID with meals plus scale, Disp: 5 pen, Rfl: 2    Insulin Syringe-Needle U-100 (B-D INS SYRINGE 0 5CC/30GX1/2") 30G X 1/2" 0 5 ML MISC, Inject under the skin 4 (four) times a day, Disp: 360 each, Rfl: 1    ONETOUCH DELICA LANCETS 72V MISC, by Does not apply route 3 (three) times a day, Disp: 270 each, Rfl: 1    pantoprazole (PROTONIX) 40 mg tablet, Take 1 tablet (40 mg total) by mouth daily, Disp: 30 tablet, Rfl: 0    calcium acetate (PHOSLO) capsule, Take 1 capsule (667 mg total) by mouth 3 (three) times a day with meals (Patient not taking: Reported on 4/9/2021), Disp: 90 capsule, Rfl: 0    nystatin (MYCOSTATIN) powder, Apply topically 2 (two) times a day for 10 days, Disp: 15 g, Rfl: 0    torsemide (DEMADEX) 10 mg tablet, Take 5 tablets (50 mg total) by mouth daily, Disp: 150 tablet, Rfl: 0    Vitals: Blood pressure 120/78, pulse 82, temperature 98 °F (36 7 °C), height 5' 9" (1 753 m), weight 108 kg (238 lb), SpO2 98 %  Body mass index is 35 15 kg/m²    Vitals:    09/21/21 1433   Weight: 108 kg (238 lb)     BP Readings from Last 3 Encounters:   09/21/21 120/78   09/08/21 159/97   07/22/21 159/74         Physical Exam  Constitutional:       General: He is not in acute distress  Appearance: He is well-developed  He is not diaphoretic  HENT:      Head: Normocephalic and atraumatic  Eyes:      Pupils: Pupils are equal, round, and reactive to light  Neck:      Thyroid: No thyromegaly  Vascular: No JVD  Trachea: No tracheal deviation  Cardiovascular:      Rate and Rhythm: Normal rate and regular rhythm  Heart sounds: S1 normal and S2 normal  Heart sounds not distant  Murmur heard  Systolic (ejection) murmur is present with a grade of 2/6  No friction rub  No gallop  No S3 or S4 sounds  Comments: S1-S2 regular with 2/6 murmur  Pulmonary:      Effort: Pulmonary effort is normal  No respiratory distress  Breath sounds: Normal breath sounds  No wheezing or rales  Comments: Lungs are clear to auscultation  Chest:      Chest wall: No tenderness  Abdominal:      General: Bowel sounds are normal  There is no distension  Palpations: Abdomen is soft  Tenderness: There is no abdominal tenderness  Musculoskeletal:         General: No deformity  Cervical back: Neck supple  Comments: No edema he had fistula in left cubital area   Skin:     General: Skin is warm and dry  Coloration: Skin is not pale  Findings: No rash  Neurological:      Mental Status: He is alert and oriented to person, place, and time  Psychiatric:         Behavior: Behavior normal          Judgment: Judgment normal            Diagnostic Studies Review Cardio:  Nuclear stress test   Nuclear stress test 10/14/2019 were nonischemic EF around 50%  Echo Doppler      Echo Doppler done on 10/06/2020 shows EF 02%, grade 2 diastolic dysfunction, moderate LVH, moderate mitral annulus calcification with mild MR mild-to-moderate aortic stenosis and mild aortic regurgitation       EKG:  EKG today shows normal sinus rhythm nonspecific ST changes heart rate 80 beats per minute  Twelve lead EKG 01/15/2020 shows normal sinus rhythm LVH by voltage  ST abnormality in inferior leads certainly cannot rule ischemia but had a nonischemic nuclear  Twelve lead EKG 03/18/2021 shows normal sinus rhythm heart rate 76 beats per minute nonspecific ST changes in inferior lead no change from old EKG  Twelve lead EKG 04/09/2021 shows normal sinus rhythm bifascicular block heart rate 87 beats per minute  As compared to previous EKG patient has not developed bundle branch block          Lab Review   Lab Results   Component Value Date    WBC 6 62 09/08/2021    HGB 11 2 (L) 09/08/2021    HCT 34 5 (L) 09/08/2021    MCV 96 09/08/2021    RDW 13 4 09/08/2021     (L) 09/08/2021     BMP:  Lab Results   Component Value Date    SODIUM 134 (L) 09/08/2021    K 5 0 09/08/2021     09/08/2021    CO2 21 09/08/2021    ANIONGAP 9 01/03/2016    BUN 71 (H) 09/08/2021    CREATININE 7 39 (H) 09/08/2021    GLUC 128 09/08/2021    GLUF 178 (H) 06/18/2021    CALCIUM 8 9 09/08/2021    CORRECTEDCA 9 0 12/31/2020    EGFR 7 09/08/2021    MG 1 9 12/30/2020     LFT:  Lab Results   Component Value Date    AST 14 07/14/2021    ALT 26 07/14/2021    ALKPHOS 138 (H) 07/14/2021    TP 7 8 07/14/2021    ALB 3 8 07/14/2021      Lab Results   Component Value Date    [id number] 1 946 08/02/2018     Lab Results   Component Value Date    HGBA1C 6 5 (H) 06/18/2021     Lipid Profile:   Lab Results   Component Value Date    CHOLESTEROL 80 01/11/2020    HDL 31 (L) 01/11/2020    LDLCALC 20 01/11/2020    TRIG 210 (H) 12/30/2020     Lab Results   Component Value Date    CHOLESTEROL 80 01/11/2020    CHOLESTEROL 70 10/29/2018     Lab Results   Component Value Date    CKTOTAL 385 (H) 12/30/2020    CKMB 2 0 12/30/2020    CKMBINDEX <1 0 12/30/2020    TROPONINI <0 02 09/08/2021     Lab Results   Component Value Date NTBNP 1,259 (H) 12/30/2020            Dr Theresa Bermudez MD [facility]      "This note has been constructed using a voice recognition system  Therefore there may be syntax, spelling, and/or grammatical errors   Please call if you have any questions  "

## 2021-09-30 ENCOUNTER — OFFICE VISIT (OUTPATIENT)
Dept: PODIATRY | Facility: CLINIC | Age: 61
End: 2021-09-30
Payer: MEDICARE

## 2021-09-30 VITALS
SYSTOLIC BLOOD PRESSURE: 134 MMHG | BODY MASS INDEX: 35.4 KG/M2 | DIASTOLIC BLOOD PRESSURE: 84 MMHG | HEART RATE: 80 BPM | WEIGHT: 239 LBS | HEIGHT: 69 IN

## 2021-09-30 DIAGNOSIS — B35.1 ONYCHOMYCOSIS: ICD-10-CM

## 2021-09-30 DIAGNOSIS — E11.42 DIABETIC POLYNEUROPATHY ASSOCIATED WITH TYPE 2 DIABETES MELLITUS (HCC): Primary | ICD-10-CM

## 2021-09-30 DIAGNOSIS — L85.1 ACQUIRED KERATODERMA: ICD-10-CM

## 2021-09-30 DIAGNOSIS — Z89.421 ABSENCE OF TOE OF RIGHT FOOT (HCC): ICD-10-CM

## 2021-09-30 PROCEDURE — 11721 DEBRIDE NAIL 6 OR MORE: CPT | Performed by: PODIATRIST

## 2021-09-30 PROCEDURE — 11055 PARING/CUTG B9 HYPRKER LES 1: CPT | Performed by: PODIATRIST

## 2021-09-30 NOTE — PROGRESS NOTES
PATIENT:  Tiffany Gar    1960    ASSESSMENT:     1  Diabetic polyneuropathy associated with type 2 diabetes mellitus ([facility] )     2  Absence of toe of right foot ([facility] )     3  Acquired keratoderma  Debridement   4  Onychomycosis  Debridement       PLAN:  Educated disease prevention and risks related to diabetes  Educated proper daily foot care and exam   Instructed proper skin care / protection and footwear  RA in 9 weeks  Procedure: All mycotic toenails were reduced and debrided in length, width, and girth using a nail nipper and dremel  All hyperkeratotic skin lesion(s) were sharply pared with a scalpel with no bleeding or evidence of ulceration  Patient tolerated procedure(s) well without complications  Subjective:      HPI:   The patients presents for diabetic foot care and exam   He has high risk diabetic foot with history of ulcer and amputation  No ulcer in his feet  Complained of thick toenails  BS is under control  The following portions of the patient's history were reviewed and updated as appropriate: allergies, current medications, past family history, past medical history, past social history, past surgical history and problem list   All pertinent labs and images were reviewed      Past Medical History  Past Medical History:   Diagnosis Date    Chronic kidney disease     Diabetes mellitus ([facility]     GERD (gastroesophageal reflux disease)     Hypercholesteremia     Hyperlipidemia     Hypertension     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    Neuropathy     Obesity     Osteomyelitis ([facility] )     last assessed 11/4/16    PVC's (premature ventricular contractions)     sees cardiology Dr Soco camargo    Stroke [facility])     last weeof July 2018 [facility]       Past Surgical History  Past Surgical History:   Procedure Laterality Date    ABDOMINAL SURGERY      CHOLECYSTECTOMY      Percutaneous    COLONOSCOPY      CYSTOSCOPY      OTHER SURGICAL HISTORY      "stimulator to control bowel movements"    DE ESOPHAGOGASTRODUODENOSCOPY TRANSORAL DIAGNOSTIC N/A 9/27/2016    Procedure: ESOPHAGOGASTRODUODENOSCOPY (EGD); Surgeon: Antoni Dial MD;  [facility]: [facility]; Service: Gastroenterology    DE LAP,CHOLECYSTECTOMY N/A 2/29/2016    Procedure: LAPAROSCOPIC CHOLECYSTECTOMY ;  Surgeon: Sherrie Amador DO;  [facility];  Service: General    ROTATOR CUFF REPAIR Right     TOE AMPUTATION Right 10/28/2016    Procedure: 3RD TOE AMPUTATION ;  Surgeon: Guanaco Enriquez DPM;  [facility];  Service:         Allergies:  Patient has no known allergies  Medications:  Current Outpatient Medications   Medication Sig Dispense Refill    aspirin (ECOTRIN LOW STRENGTH) 81 mg EC tablet Take 81 mg by mouth daily Resume on 8/14      atorvastatin (LIPITOR) 40 mg tablet Take 1 tablet (40 mg total) by mouth daily with dinner 90 tablet 1    BD Pen Needle Adina U/F 32G X 4 MM MISC USE TO INJECT INSULIN 4 TIMES DAILY 400 each 1    Blood Glucose Monitoring Suppl (True Metrix Meter) w/Device KIT Use to test blood sugars 3 times daily 1 kit 0    Blood Pressure Monitoring (BLOOD PRESSURE CUFF) MISC Use to check blood pressure before taking blood pressure medication and 1 hour after and follow instructions provided in discharge instructions based on the readings  1 each 0    carvedilol (COREG) 6 25 mg tablet Take 1 tablet (6 25 mg total) by mouth 2 (two) times a day with meals 180 tablet 1    Cholecalciferol (Vitamin D3) 1 25 MG (60538 UT) CAPS TAKE 1 CAPSULE BY MOUTH ONE TIME PER WEEK 12 capsule 2    Continuous Blood Gluc  (DEXCOM G6 ) LANCE Use as directed for continuous glucose monitoring 1 Device 0    Continuous Blood Gluc Sensor (DEXCOM G6 SENSOR) MISC Use as directed for continuous glucose monitoring   Change every 10 days 1 each 11    Continuous Blood Gluc Transmit (DEXCOM G6 TRANSMITTER) MISC Use as directed for continuous glucose monitoring-Change every 3 months 1 each 3    glucose blood (True Metrix Blood Glucose Test) test strip Use 1 each 3 (three) times a day Use as instructed 100 each 5    Incontinence Supplies (MALE URINAL) MISC by Does not apply route daily 6 each 3    insulin glargine (Lantus) 100 units/mL subcutaneous injection Inject 14 Units under the skin daily 10 mL 1    insulin lispro (HumaLOG KwikPen) 100 units/mL injection pen Inject 4 units TID with meals plus scale 5 pen 2    Insulin Syringe-Needle U-100 (B-D INS SYRINGE 0 5CC/30GX1/2") 30G X 1/2" 0 5 ML MISC Inject under the skin 4 (four) times a day 360 each 1    ONETOUCH DELICA LANCETS 61X MISC by Does not apply route 3 (three) times a day 270 each 1    pantoprazole (PROTONIX) 40 mg tablet Take 1 tablet (40 mg total) by mouth daily 30 tablet 0    calcium acetate (PHOSLO) capsule Take 1 capsule (667 mg total) by mouth 3 (three) times a day with meals (Patient not taking: Reported on 4/9/2021) 90 capsule 0    nystatin (MYCOSTATIN) powder Apply topically 2 (two) times a day for 10 days 15 g 0    torsemide (DEMADEX) 10 mg tablet Take 5 tablets (50 mg total) by mouth daily 150 tablet 0     No current facility-administered medications for this visit         Social History:  Social History     Socioeconomic History    Marital status: /Civil Union     Spouse name: None    Number of children: None    Years of education: None    Highest education level: Not asked   Occupational History    Occupation: disabled   Tobacco Use    Smoking status: Never Smoker    Smokeless tobacco: Never Used   Vaping Use    Vaping Use: Never used   Substance and Sexual Activity    Alcohol use: Not Currently    Drug use: No    Sexual activity: Not Currently   Other Topics Concern    None   Social History Narrative    Daily caffeine consumption 2-3 servings a day     Social Determinants of Health     Financial Resource Strain: Unknown    Difficulty of Paying Living Expenses: Patient refused   Food Insecurity: Unknown    Worried About Running Out of Food in the Last Year: Patient refused    [address] in the Last Year: Patient refused   Transportation Needs: No Transportation Needs    Lack of Transportation (Medical): No    Lack of Transportation (Non-Medical): No   Physical Activity:     Days of Exercise per Week:     Minutes of Exercise per Session:    Stress:     Feeling of Stress :    Social Connections:     Frequency of Communication with Friends and Family:     Frequency of Social Gatherings with Friends and Family:     Attends [employer] Services:     Active Member of Clubs or Organizations:     Attends Club or Organization Meetings:     Marital Status:    Intimate Partner Violence:     Fear of Current or Ex-Partner:     Emotionally Abused:     Physically Abused:     Sexually Abused:         Review of Systems   Constitutional: Negative for chills and fever  Respiratory: Negative for cough and shortness of breath  Cardiovascular: Negative for chest pain  Gastrointestinal: Negative for constipation, diarrhea, nausea and vomiting  Neurological: Positive for numbness  Objective:      /84   Pulse 80   Ht 5' 9" (1 753 m)   Wt 108 kg (239 lb)   BMI 35 29 kg/m²          Physical Exam  Vitals reviewed  Constitutional:       General: He is not in acute distress  Appearance: Normal appearance  He is well-developed  He is not ill-appearing  Cardiovascular:      Rate and Rhythm: Normal rate and regular rhythm  Pulses:           Dorsalis pedis pulses are 0 on the right side and 0 on the left side  Posterior tibial pulses are 1+ on the right side and 1+ on the left side  Comments: Decreased LE edema  Pulmonary:      Effort: Pulmonary effort is normal  No respiratory distress  Musculoskeletal:         General: Deformity present  No tenderness or signs of injury  Right foot: No foot drop        Left foot: No foot drop  Comments: Hammertoe deformity noted  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  Feet:      Right foot:      Skin integrity: Dry skin present  No ulcer, skin breakdown, erythema, warmth or callus  Left foot:      Skin integrity: Dry skin present  No ulcer, skin breakdown, erythema, warmth or callus  Skin:     General: Skin is warm  Capillary Refill: Capillary refill takes less than 2 seconds  Coloration: Skin is not cyanotic or mottled  Findings: No ecchymosis or erythema  Rash is not purpuric  Nails: There is no clubbing  Comments: Thick mycotic nails X 7 with discoloration and onycholysis  HPK on left submet 3  He has class finding with previous toe amputation  Neurological:      General: No focal deficit present  Mental Status: He is alert and oriented to person, place, and time  Cranial Nerves: No cranial nerve deficit  Sensory: Sensory deficit present  Motor: No weakness  Psychiatric:         Mood and Affect: Mood normal          Behavior: Behavior normal          Thought Content:  Thought content normal          Judgment: Judgment normal

## 2021-10-06 DIAGNOSIS — E55.9 VITAMIN D DEFICIENCY: ICD-10-CM

## 2021-10-06 RX ORDER — CHOLECALCIFEROL (VITAMIN D3) 1250 MCG
CAPSULE ORAL
Qty: 12 CAPSULE | Refills: 2 | Status: SHIPPED | OUTPATIENT
Start: 2021-10-06 | End: 2022-06-03

## 2021-10-26 ENCOUNTER — TELEPHONE (OUTPATIENT)
Dept: INTERNAL MEDICINE CLINIC | Facility: CLINIC | Age: 61
End: 2021-10-26

## 2021-10-26 DIAGNOSIS — Z99.2 ESRD ON DIALYSIS (HCC): ICD-10-CM

## 2021-10-26 DIAGNOSIS — F41.8 ANXIETY ASSOCIATED WITH DEPRESSION: Primary | ICD-10-CM

## 2021-10-26 DIAGNOSIS — N18.6 ESRD ON DIALYSIS (HCC): ICD-10-CM

## 2021-11-02 ENCOUNTER — CLINICAL SUPPORT (OUTPATIENT)
Dept: INTERNAL MEDICINE CLINIC | Facility: CLINIC | Age: 61
End: 2021-11-02
Payer: MEDICARE

## 2021-11-02 DIAGNOSIS — F41.8 ANXIETY ASSOCIATED WITH DEPRESSION: Primary | ICD-10-CM

## 2021-11-02 DIAGNOSIS — Z23 FLU VACCINE NEED: ICD-10-CM

## 2021-11-02 PROCEDURE — 90682 RIV4 VACC RECOMBINANT DNA IM: CPT

## 2021-11-02 PROCEDURE — G0008 ADMIN INFLUENZA VIRUS VAC: HCPCS

## 2021-11-02 RX ORDER — ESCITALOPRAM OXALATE 5 MG/1
5 TABLET ORAL
Qty: 30 TABLET | Refills: 1 | Status: SHIPPED | OUTPATIENT
Start: 2021-11-02 | End: 2021-11-26

## 2021-11-03 ENCOUNTER — TELEPHONE (OUTPATIENT)
Dept: INTERNAL MEDICINE CLINIC | Facility: CLINIC | Age: 61
End: 2021-11-03

## 2021-11-08 ENCOUNTER — TELEPHONE (OUTPATIENT)
Dept: PSYCHIATRY | Facility: CLINIC | Age: 61
End: 2021-11-08

## 2021-11-25 DIAGNOSIS — F41.8 ANXIETY ASSOCIATED WITH DEPRESSION: ICD-10-CM

## 2021-11-26 RX ORDER — ESCITALOPRAM OXALATE 5 MG/1
TABLET ORAL
Qty: 30 TABLET | Refills: 1 | Status: SHIPPED | OUTPATIENT
Start: 2021-11-26 | End: 2021-12-20

## 2021-12-02 ENCOUNTER — OFFICE VISIT (OUTPATIENT)
Dept: PODIATRY | Facility: CLINIC | Age: 61
End: 2021-12-02
Payer: MEDICARE

## 2021-12-02 VITALS
WEIGHT: 231.2 LBS | SYSTOLIC BLOOD PRESSURE: 144 MMHG | HEART RATE: 77 BPM | DIASTOLIC BLOOD PRESSURE: 85 MMHG | HEIGHT: 69 IN | BODY MASS INDEX: 34.24 KG/M2

## 2021-12-02 DIAGNOSIS — M10.371 ACUTE GOUT DUE TO RENAL IMPAIRMENT INVOLVING RIGHT FOOT: Primary | ICD-10-CM

## 2021-12-02 DIAGNOSIS — Z89.421 ABSENCE OF TOE OF RIGHT FOOT (HCC): ICD-10-CM

## 2021-12-02 DIAGNOSIS — M25.571 PAIN IN JOINT OF RIGHT FOOT: ICD-10-CM

## 2021-12-02 DIAGNOSIS — B35.1 ONYCHOMYCOSIS: ICD-10-CM

## 2021-12-02 DIAGNOSIS — E11.42 DIABETIC POLYNEUROPATHY ASSOCIATED WITH TYPE 2 DIABETES MELLITUS (HCC): ICD-10-CM

## 2021-12-02 DIAGNOSIS — L85.1 ACQUIRED KERATODERMA: ICD-10-CM

## 2021-12-02 PROCEDURE — 99213 OFFICE O/P EST LOW 20 MIN: CPT | Performed by: PODIATRIST

## 2021-12-02 PROCEDURE — 11055 PARING/CUTG B9 HYPRKER LES 1: CPT | Performed by: PODIATRIST

## 2021-12-02 PROCEDURE — 11721 DEBRIDE NAIL 6 OR MORE: CPT | Performed by: PODIATRIST

## 2021-12-02 RX ORDER — METHYLPREDNISOLONE 4 MG/1
TABLET ORAL
Qty: 1 EACH | Refills: 0 | Status: SHIPPED | OUTPATIENT
Start: 2021-12-02 | End: 2021-12-09

## 2021-12-09 ENCOUNTER — OFFICE VISIT (OUTPATIENT)
Dept: INTERNAL MEDICINE CLINIC | Facility: CLINIC | Age: 61
End: 2021-12-09
Payer: MEDICARE

## 2021-12-09 VITALS
HEART RATE: 68 BPM | SYSTOLIC BLOOD PRESSURE: 132 MMHG | TEMPERATURE: 98.1 F | RESPIRATION RATE: 16 BRPM | OXYGEN SATURATION: 98 % | DIASTOLIC BLOOD PRESSURE: 82 MMHG | HEIGHT: 69 IN | BODY MASS INDEX: 34.3 KG/M2 | WEIGHT: 231.6 LBS

## 2021-12-09 DIAGNOSIS — Z99.2 ESRD ON DIALYSIS (HCC): ICD-10-CM

## 2021-12-09 DIAGNOSIS — E11.22 TYPE 2 DIABETES MELLITUS WITH CHRONIC KIDNEY DISEASE ON CHRONIC DIALYSIS, WITH LONG-TERM CURRENT USE OF INSULIN (HCC): Primary | ICD-10-CM

## 2021-12-09 DIAGNOSIS — F41.8 ANXIETY ASSOCIATED WITH DEPRESSION: ICD-10-CM

## 2021-12-09 DIAGNOSIS — N18.6 ESRD ON DIALYSIS (HCC): ICD-10-CM

## 2021-12-09 DIAGNOSIS — Z99.2 TYPE 2 DIABETES MELLITUS WITH CHRONIC KIDNEY DISEASE ON CHRONIC DIALYSIS, WITH LONG-TERM CURRENT USE OF INSULIN (HCC): Primary | ICD-10-CM

## 2021-12-09 DIAGNOSIS — E78.2 MIXED HYPERLIPIDEMIA: ICD-10-CM

## 2021-12-09 DIAGNOSIS — I12.0 BENIGN HYPERTENSION WITH CHRONIC KIDNEY DISEASE, STAGE V (HCC): ICD-10-CM

## 2021-12-09 DIAGNOSIS — N18.6 TYPE 2 DIABETES MELLITUS WITH CHRONIC KIDNEY DISEASE ON CHRONIC DIALYSIS, WITH LONG-TERM CURRENT USE OF INSULIN (HCC): Primary | ICD-10-CM

## 2021-12-09 DIAGNOSIS — N18.5 BENIGN HYPERTENSION WITH CHRONIC KIDNEY DISEASE, STAGE V (HCC): ICD-10-CM

## 2021-12-09 DIAGNOSIS — Z79.4 TYPE 2 DIABETES MELLITUS WITH CHRONIC KIDNEY DISEASE ON CHRONIC DIALYSIS, WITH LONG-TERM CURRENT USE OF INSULIN (HCC): Primary | ICD-10-CM

## 2021-12-09 LAB — SL AMB POCT HEMOGLOBIN AIC: 6.4 (ref ?–6.5)

## 2021-12-09 PROCEDURE — 83036 HEMOGLOBIN GLYCOSYLATED A1C: CPT | Performed by: INTERNAL MEDICINE

## 2021-12-09 PROCEDURE — 99214 OFFICE O/P EST MOD 30 MIN: CPT | Performed by: INTERNAL MEDICINE

## 2021-12-10 ENCOUNTER — TELEPHONE (OUTPATIENT)
Dept: ADMINISTRATIVE | Facility: OTHER | Age: 61
End: 2021-12-10

## 2021-12-13 ENCOUNTER — OFFICE VISIT (OUTPATIENT)
Dept: ENDOCRINOLOGY | Facility: CLINIC | Age: 61
End: 2021-12-13
Payer: MEDICARE

## 2021-12-13 ENCOUNTER — TELEPHONE (OUTPATIENT)
Dept: INTERNAL MEDICINE CLINIC | Facility: CLINIC | Age: 61
End: 2021-12-13

## 2021-12-13 VITALS
BODY MASS INDEX: 33.37 KG/M2 | WEIGHT: 226 LBS | TEMPERATURE: 97.6 F | SYSTOLIC BLOOD PRESSURE: 124 MMHG | DIASTOLIC BLOOD PRESSURE: 68 MMHG | HEART RATE: 84 BPM

## 2021-12-13 DIAGNOSIS — E78.2 MIXED HYPERLIPIDEMIA: ICD-10-CM

## 2021-12-13 DIAGNOSIS — N18.5 BENIGN HYPERTENSION WITH CHRONIC KIDNEY DISEASE, STAGE V (HCC): ICD-10-CM

## 2021-12-13 DIAGNOSIS — I12.0 BENIGN HYPERTENSION WITH CHRONIC KIDNEY DISEASE, STAGE V (HCC): ICD-10-CM

## 2021-12-13 DIAGNOSIS — I69.30 HISTORY OF ISCHEMIC CEREBROVASCULAR ACCIDENT (CVA) WITH RESIDUAL DEFICIT: ICD-10-CM

## 2021-12-13 DIAGNOSIS — E11.65 TYPE 2 DIABETES MELLITUS WITH HYPERGLYCEMIA, WITH LONG-TERM CURRENT USE OF INSULIN (HCC): Primary | ICD-10-CM

## 2021-12-13 DIAGNOSIS — Z79.4 TYPE 2 DIABETES MELLITUS WITH HYPERGLYCEMIA, WITH LONG-TERM CURRENT USE OF INSULIN (HCC): Primary | ICD-10-CM

## 2021-12-13 DIAGNOSIS — N18.30 STAGE 3 CHRONIC KIDNEY DISEASE (HCC): ICD-10-CM

## 2021-12-13 PROCEDURE — 99214 OFFICE O/P EST MOD 30 MIN: CPT | Performed by: INTERNAL MEDICINE

## 2021-12-13 RX ORDER — ATORVASTATIN CALCIUM 40 MG/1
40 TABLET, FILM COATED ORAL
Qty: 90 TABLET | Refills: 1 | Status: SHIPPED | OUTPATIENT
Start: 2021-12-13 | End: 2022-06-08

## 2021-12-15 ENCOUNTER — TELEPHONE (OUTPATIENT)
Dept: ENDOCRINOLOGY | Facility: CLINIC | Age: 61
End: 2021-12-15

## 2021-12-15 ENCOUNTER — TELEPHONE (OUTPATIENT)
Dept: PODIATRY | Facility: CLINIC | Age: 61
End: 2021-12-15

## 2021-12-20 DIAGNOSIS — F41.8 ANXIETY ASSOCIATED WITH DEPRESSION: ICD-10-CM

## 2021-12-20 RX ORDER — ESCITALOPRAM OXALATE 5 MG/1
TABLET ORAL
Qty: 30 TABLET | Refills: 1 | Status: SHIPPED | OUTPATIENT
Start: 2021-12-20 | End: 2022-01-19

## 2021-12-30 ENCOUNTER — OFFICE VISIT (OUTPATIENT)
Dept: INTERNAL MEDICINE CLINIC | Facility: CLINIC | Age: 61
End: 2021-12-30
Payer: MEDICARE

## 2021-12-30 VITALS
BODY MASS INDEX: 34.18 KG/M2 | HEART RATE: 83 BPM | DIASTOLIC BLOOD PRESSURE: 82 MMHG | RESPIRATION RATE: 16 BRPM | OXYGEN SATURATION: 99 % | SYSTOLIC BLOOD PRESSURE: 122 MMHG | HEIGHT: 69 IN | WEIGHT: 230.8 LBS | TEMPERATURE: 98.2 F

## 2021-12-30 DIAGNOSIS — H10.022 MUCOPURULENT CONJUNCTIVITIS OF LEFT EYE: Primary | ICD-10-CM

## 2021-12-30 PROCEDURE — 99213 OFFICE O/P EST LOW 20 MIN: CPT | Performed by: INTERNAL MEDICINE

## 2021-12-30 RX ORDER — OFLOXACIN 3 MG/ML
SOLUTION/ DROPS OPHTHALMIC
Qty: 5 ML | Refills: 0 | Status: SHIPPED | OUTPATIENT
Start: 2021-12-30 | End: 2022-01-03 | Stop reason: HOSPADM

## 2022-01-01 LAB
ALBUMIN SERPL BCP-MCNC: 4 G/DL (ref 3.5–5)
ALP SERPL-CCNC: 99 U/L (ref 46–116)
ALT SERPL W P-5'-P-CCNC: 28 U/L (ref 12–78)
ANION GAP SERPL CALCULATED.3IONS-SCNC: 11 MMOL/L (ref 4–13)
AST SERPL W P-5'-P-CCNC: 13 U/L (ref 5–45)
BASOPHILS # BLD AUTO: 0.02 THOUSANDS/ΜL (ref 0–0.1)
BASOPHILS NFR BLD AUTO: 0 % (ref 0–1)
BILIRUB SERPL-MCNC: 0.69 MG/DL (ref 0.2–1)
BUN SERPL-MCNC: 53 MG/DL (ref 5–25)
CALCIUM SERPL-MCNC: 9.1 MG/DL (ref 8.3–10.1)
CHLORIDE SERPL-SCNC: 94 MMOL/L (ref 100–108)
CO2 SERPL-SCNC: 30 MMOL/L (ref 21–32)
CREAT SERPL-MCNC: 6.78 MG/DL (ref 0.6–1.3)
EOSINOPHIL # BLD AUTO: 0.08 THOUSAND/ΜL (ref 0–0.61)
EOSINOPHIL NFR BLD AUTO: 1 % (ref 0–6)
ERYTHROCYTE [DISTWIDTH] IN BLOOD BY AUTOMATED COUNT: 14 % (ref 11.6–15.1)
GFR SERPL CREATININE-BSD FRML MDRD: 7 ML/MIN/1.73SQ M
GLUCOSE SERPL-MCNC: 186 MG/DL (ref 65–140)
HCT VFR BLD AUTO: 39.1 % (ref 36.5–49.3)
HGB BLD-MCNC: 12.8 G/DL (ref 12–17)
IMM GRANULOCYTES # BLD AUTO: 0.08 THOUSAND/UL (ref 0–0.2)
IMM GRANULOCYTES NFR BLD AUTO: 1 % (ref 0–2)
LIPASE SERPL-CCNC: 187 U/L (ref 73–393)
LYMPHOCYTES # BLD AUTO: 0.59 THOUSANDS/ΜL (ref 0.6–4.47)
LYMPHOCYTES NFR BLD AUTO: 5 % (ref 14–44)
MCH RBC QN AUTO: 30.8 PG (ref 26.8–34.3)
MCHC RBC AUTO-ENTMCNC: 32.7 G/DL (ref 31.4–37.4)
MCV RBC AUTO: 94 FL (ref 82–98)
MONOCYTES # BLD AUTO: 0.83 THOUSAND/ΜL (ref 0.17–1.22)
MONOCYTES NFR BLD AUTO: 8 % (ref 4–12)
NEUTROPHILS # BLD AUTO: 9.51 THOUSANDS/ΜL (ref 1.85–7.62)
NEUTS SEG NFR BLD AUTO: 85 % (ref 43–75)
NRBC BLD AUTO-RTO: 0 /100 WBCS
PLATELET # BLD AUTO: 153 THOUSANDS/UL (ref 149–390)
PMV BLD AUTO: 9.9 FL (ref 8.9–12.7)
POTASSIUM SERPL-SCNC: 4.6 MMOL/L (ref 3.5–5.3)
PROT SERPL-MCNC: 8 G/DL (ref 6.4–8.2)
RBC # BLD AUTO: 4.15 MILLION/UL (ref 3.88–5.62)
SODIUM SERPL-SCNC: 135 MMOL/L (ref 136–145)
WBC # BLD AUTO: 11.11 THOUSAND/UL (ref 4.31–10.16)

## 2022-01-01 PROCEDURE — 99285 EMERGENCY DEPT VISIT HI MDM: CPT

## 2022-01-01 PROCEDURE — 85025 COMPLETE CBC W/AUTO DIFF WBC: CPT | Performed by: EMERGENCY MEDICINE

## 2022-01-01 PROCEDURE — 36415 COLL VENOUS BLD VENIPUNCTURE: CPT

## 2022-01-01 PROCEDURE — 83690 ASSAY OF LIPASE: CPT | Performed by: EMERGENCY MEDICINE

## 2022-01-01 PROCEDURE — 80053 COMPREHEN METABOLIC PANEL: CPT | Performed by: EMERGENCY MEDICINE

## 2022-01-02 ENCOUNTER — APPOINTMENT (EMERGENCY)
Dept: CT IMAGING | Facility: HOSPITAL | Age: 62
DRG: 391 | End: 2022-01-02
Payer: MEDICARE

## 2022-01-02 ENCOUNTER — HOSPITAL ENCOUNTER (INPATIENT)
Facility: HOSPITAL | Age: 62
LOS: 1 days | Discharge: HOME | DRG: 391 | End: 2022-01-03
Attending: EMERGENCY MEDICINE | Admitting: INTERNAL MEDICINE
Payer: MEDICARE

## 2022-01-02 DIAGNOSIS — N18.6 ESRD ON DIALYSIS (HCC): ICD-10-CM

## 2022-01-02 DIAGNOSIS — R53.1 WEAKNESS: ICD-10-CM

## 2022-01-02 DIAGNOSIS — Z99.2 ESRD ON DIALYSIS (HCC): ICD-10-CM

## 2022-01-02 DIAGNOSIS — R19.7 DIARRHEA: ICD-10-CM

## 2022-01-02 DIAGNOSIS — K52.9 GASTROENTERITIS: Primary | ICD-10-CM

## 2022-01-02 DIAGNOSIS — R11.10 VOMITING: ICD-10-CM

## 2022-01-02 PROBLEM — D72.829 LEUKOCYTOSIS: Status: ACTIVE | Noted: 2022-01-02

## 2022-01-02 LAB
2HR DELTA HS TROPONIN: -2 NG/L
4HR DELTA HS TROPONIN: -1 NG/L
ATRIAL RATE: 96 BPM
BACTERIA UR QL AUTO: ABNORMAL /HPF
BILIRUB UR QL STRIP: NEGATIVE
CARDIAC TROPONIN I PNL SERPL HS: 27 NG/L
CARDIAC TROPONIN I PNL SERPL HS: 28 NG/L
CARDIAC TROPONIN I PNL SERPL HS: 29 NG/L
CLARITY UR: CLEAR
COLOR UR: YELLOW
FLUAV RNA RESP QL NAA+PROBE: NEGATIVE
FLUBV RNA RESP QL NAA+PROBE: NEGATIVE
GLUCOSE SERPL-MCNC: 110 MG/DL (ref 65–140)
GLUCOSE SERPL-MCNC: 127 MG/DL (ref 65–140)
GLUCOSE SERPL-MCNC: 141 MG/DL (ref 65–140)
GLUCOSE SERPL-MCNC: 161 MG/DL (ref 65–140)
GLUCOSE SERPL-MCNC: 177 MG/DL (ref 65–140)
GLUCOSE UR STRIP-MCNC: ABNORMAL MG/DL
HGB UR QL STRIP.AUTO: ABNORMAL
KETONES UR STRIP-MCNC: NEGATIVE MG/DL
LACTATE SERPL-SCNC: 1.2 MMOL/L (ref 0.5–2)
LEUKOCYTE ESTERASE UR QL STRIP: NEGATIVE
MAGNESIUM SERPL-MCNC: 1.9 MG/DL (ref 1.6–2.6)
MUCOUS THREADS UR QL AUTO: ABNORMAL
NITRITE UR QL STRIP: NEGATIVE
NON-SQ EPI CELLS URNS QL MICRO: ABNORMAL /HPF
NT-PROBNP SERPL-MCNC: 1859 PG/ML
P AXIS: 58 DEGREES
PH UR STRIP.AUTO: 8 [PH]
PR INTERVAL: 144 MS
PROT UR STRIP-MCNC: ABNORMAL MG/DL
QRS AXIS: -54 DEGREES
QRSD INTERVAL: 164 MS
QT INTERVAL: 404 MS
QTC INTERVAL: 510 MS
RBC #/AREA URNS AUTO: ABNORMAL /HPF
RSV RNA RESP QL NAA+PROBE: NEGATIVE
SARS-COV-2 RNA RESP QL NAA+PROBE: NEGATIVE
SP GR UR STRIP.AUTO: 1.01 (ref 1–1.03)
T WAVE AXIS: 19 DEGREES
UROBILINOGEN UR QL STRIP.AUTO: 0.2 E.U./DL
VENTRICULAR RATE: 96 BPM
WBC #/AREA URNS AUTO: ABNORMAL /HPF

## 2022-01-02 PROCEDURE — G1004 CDSM NDSC: HCPCS

## 2022-01-02 PROCEDURE — 0241U HB NFCT DS VIR RESP RNA 4 TRGT: CPT | Performed by: PHYSICIAN ASSISTANT

## 2022-01-02 PROCEDURE — 93005 ELECTROCARDIOGRAM TRACING: CPT

## 2022-01-02 PROCEDURE — 87493 C DIFF AMPLIFIED PROBE: CPT | Performed by: PHYSICIAN ASSISTANT

## 2022-01-02 PROCEDURE — 81001 URINALYSIS AUTO W/SCOPE: CPT | Performed by: PHYSICIAN ASSISTANT

## 2022-01-02 PROCEDURE — 93010 ELECTROCARDIOGRAM REPORT: CPT | Performed by: INTERNAL MEDICINE

## 2022-01-02 PROCEDURE — 96375 TX/PRO/DX INJ NEW DRUG ADDON: CPT

## 2022-01-02 PROCEDURE — 96374 THER/PROPH/DIAG INJ IV PUSH: CPT

## 2022-01-02 PROCEDURE — 74177 CT ABD & PELVIS W/CONTRAST: CPT

## 2022-01-02 PROCEDURE — 84484 ASSAY OF TROPONIN QUANT: CPT | Performed by: PHYSICIAN ASSISTANT

## 2022-01-02 PROCEDURE — 87086 URINE CULTURE/COLONY COUNT: CPT | Performed by: PHYSICIAN ASSISTANT

## 2022-01-02 PROCEDURE — 87040 BLOOD CULTURE FOR BACTERIA: CPT | Performed by: PHYSICIAN ASSISTANT

## 2022-01-02 PROCEDURE — 83605 ASSAY OF LACTIC ACID: CPT | Performed by: PHYSICIAN ASSISTANT

## 2022-01-02 PROCEDURE — 83880 ASSAY OF NATRIURETIC PEPTIDE: CPT | Performed by: PHYSICIAN ASSISTANT

## 2022-01-02 PROCEDURE — 82948 REAGENT STRIP/BLOOD GLUCOSE: CPT

## 2022-01-02 PROCEDURE — 99223 1ST HOSP IP/OBS HIGH 75: CPT | Performed by: INTERNAL MEDICINE

## 2022-01-02 PROCEDURE — 83735 ASSAY OF MAGNESIUM: CPT | Performed by: PHYSICIAN ASSISTANT

## 2022-01-02 PROCEDURE — 99285 EMERGENCY DEPT VISIT HI MDM: CPT | Performed by: PHYSICIAN ASSISTANT

## 2022-01-02 PROCEDURE — 71275 CT ANGIOGRAPHY CHEST: CPT

## 2022-01-02 PROCEDURE — 36415 COLL VENOUS BLD VENIPUNCTURE: CPT | Performed by: PHYSICIAN ASSISTANT

## 2022-01-02 RX ORDER — CALCIUM ACETATE 667 MG/1
1334 CAPSULE ORAL
Status: DISCONTINUED | OUTPATIENT
Start: 2022-01-02 | End: 2022-01-03 | Stop reason: HOSPADM

## 2022-01-02 RX ORDER — DOXERCALCIFEROL 2 UG/ML
4 INJECTION, SOLUTION INTRAVENOUS
Status: DISCONTINUED | OUTPATIENT
Start: 2022-01-02 | End: 2022-01-03 | Stop reason: HOSPADM

## 2022-01-02 RX ORDER — CARVEDILOL 6.25 MG/1
6.25 TABLET ORAL
Status: DISCONTINUED | OUTPATIENT
Start: 2022-01-02 | End: 2022-01-02

## 2022-01-02 RX ORDER — INSULIN GLARGINE 100 [IU]/ML
14 INJECTION, SOLUTION SUBCUTANEOUS DAILY
Status: DISCONTINUED | OUTPATIENT
Start: 2022-01-02 | End: 2022-01-02

## 2022-01-02 RX ORDER — HEPARIN SODIUM 5000 [USP'U]/ML
5000 INJECTION, SOLUTION INTRAVENOUS; SUBCUTANEOUS EVERY 8 HOURS SCHEDULED
Status: DISCONTINUED | OUTPATIENT
Start: 2022-01-02 | End: 2022-01-03 | Stop reason: HOSPADM

## 2022-01-02 RX ORDER — ATORVASTATIN CALCIUM 40 MG/1
40 TABLET, FILM COATED ORAL
Status: DISCONTINUED | OUTPATIENT
Start: 2022-01-02 | End: 2022-01-03 | Stop reason: HOSPADM

## 2022-01-02 RX ORDER — INSULIN GLARGINE 100 [IU]/ML
7 INJECTION, SOLUTION SUBCUTANEOUS DAILY
Status: DISCONTINUED | OUTPATIENT
Start: 2022-01-02 | End: 2022-01-03 | Stop reason: HOSPADM

## 2022-01-02 RX ORDER — ASPIRIN 81 MG/1
81 TABLET ORAL DAILY
Status: DISCONTINUED | OUTPATIENT
Start: 2022-01-02 | End: 2022-01-03 | Stop reason: HOSPADM

## 2022-01-02 RX ORDER — DOXERCALCIFEROL 0.5 UG/1
4 CAPSULE ORAL
COMMUNITY
Start: 2021-11-10 | End: 2022-06-15

## 2022-01-02 RX ORDER — CIPROFLOXACIN 2 MG/ML
400 INJECTION, SOLUTION INTRAVENOUS ONCE
Status: COMPLETED | OUTPATIENT
Start: 2022-01-02 | End: 2022-01-02

## 2022-01-02 RX ORDER — CARVEDILOL 6.25 MG/1
6.25 TABLET ORAL
Status: DISCONTINUED | OUTPATIENT
Start: 2022-01-02 | End: 2022-01-03 | Stop reason: HOSPADM

## 2022-01-02 RX ORDER — ACETAMINOPHEN 325 MG/1
650 TABLET ORAL EVERY 6 HOURS PRN
Status: DISCONTINUED | OUTPATIENT
Start: 2022-01-02 | End: 2022-01-03 | Stop reason: HOSPADM

## 2022-01-02 RX ORDER — ESCITALOPRAM OXALATE 10 MG/1
5 TABLET ORAL
Status: DISCONTINUED | OUTPATIENT
Start: 2022-01-02 | End: 2022-01-03 | Stop reason: HOSPADM

## 2022-01-02 RX ORDER — CARVEDILOL 6.25 MG/1
6.25 TABLET ORAL
Status: DISCONTINUED | OUTPATIENT
Start: 2022-01-03 | End: 2022-01-03 | Stop reason: HOSPADM

## 2022-01-02 RX ORDER — ACETAMINOPHEN 325 MG/1
650 TABLET ORAL ONCE
Status: COMPLETED | OUTPATIENT
Start: 2022-01-02 | End: 2022-01-02

## 2022-01-02 RX ORDER — CALCIUM ACETATE 667 MG/1
TABLET ORAL
COMMUNITY
Start: 2021-10-17

## 2022-01-02 RX ORDER — ONDANSETRON 2 MG/ML
4 INJECTION INTRAMUSCULAR; INTRAVENOUS ONCE
Status: COMPLETED | OUTPATIENT
Start: 2022-01-02 | End: 2022-01-02

## 2022-01-02 RX ADMIN — CIPROFLOXACIN 400 MG: 2 INJECTION, SOLUTION INTRAVENOUS at 03:43

## 2022-01-02 RX ADMIN — INSULIN GLARGINE 7 UNITS: 100 INJECTION, SOLUTION SUBCUTANEOUS at 13:25

## 2022-01-02 RX ADMIN — ACETAMINOPHEN 650 MG: 325 TABLET, FILM COATED ORAL at 01:53

## 2022-01-02 RX ADMIN — ONDANSETRON 4 MG: 2 INJECTION INTRAMUSCULAR; INTRAVENOUS at 02:55

## 2022-01-02 RX ADMIN — ASPIRIN 81 MG: 81 TABLET, COATED ORAL at 13:14

## 2022-01-02 RX ADMIN — IODIXANOL 100 ML: 320 INJECTION, SOLUTION INTRAVASCULAR at 01:47

## 2022-01-02 RX ADMIN — INSULIN LISPRO 1 UNITS: 100 INJECTION, SOLUTION INTRAVENOUS; SUBCUTANEOUS at 13:25

## 2022-01-02 RX ADMIN — ATORVASTATIN CALCIUM 40 MG: 40 TABLET, FILM COATED ORAL at 16:57

## 2022-01-02 RX ADMIN — SODIUM CHLORIDE 250 ML: 0.9 INJECTION, SOLUTION INTRAVENOUS at 03:35

## 2022-01-02 RX ADMIN — CARVEDILOL 6.25 MG: 6.25 TABLET, FILM COATED ORAL at 13:14

## 2022-01-02 RX ADMIN — CALCIUM ACETATE 1334 MG: 667 CAPSULE ORAL at 13:14

## 2022-01-02 RX ADMIN — HEPARIN SODIUM 5000 UNITS: 5000 INJECTION INTRAVENOUS; SUBCUTANEOUS at 22:29

## 2022-01-02 RX ADMIN — HEPARIN SODIUM 5000 UNITS: 5000 INJECTION INTRAVENOUS; SUBCUTANEOUS at 13:25

## 2022-01-02 RX ADMIN — CALCIUM ACETATE 1334 MG: 667 CAPSULE ORAL at 16:57

## 2022-01-02 NOTE — ED PROVIDER NOTES
History  Chief Complaint   Patient presents with    Vomiting     C/o diarrhea and vomiting starting one hour ago  Patient is a 42-year-old male presenting to the emergency room for vomiting and diarrhea for 1 day  Patient states he also has some associated chest pain and shortness of breath  Patient states he has had multiple episodes of vomiting and diarrhea and is starting to feel very weak  Patient denies any blood in his stools or vomit, fever, chills, back pain  History provided by:  Patient   used: No        Prior to Admission Medications   Prescriptions Last Dose Informant Patient Reported? Taking? BD Pen Needle Adina U/F 32G X 4 MM MISC  Spouse/Significant Other No No   Sig: USE TO INJECT INSULIN 4 TIMES DAILY   Blood Glucose Monitoring Suppl (True Metrix Meter) w/Device KIT  Spouse/Significant Other No No   Sig: Use to test blood sugars 3 times daily   Blood Pressure Monitoring (BLOOD PRESSURE CUFF) MISC  Spouse/Significant Other No No   Sig: Use to check blood pressure before taking blood pressure medication and 1 hour after and follow instructions provided in discharge instructions based on the readings  Cholecalciferol (Vitamin D3) 1 25 MG (68794 UT) CAPS   No No   Sig: TAKE 1 CAPSULE BY MOUTH ONE TIME PER WEEK   Continuous Blood Gluc  (DEXCOM G6 ) LANCE  Spouse/Significant Other No No   Sig: Use as directed for continuous glucose monitoring   Patient not taking: Reported on 12/13/2021    Continuous Blood Gluc Sensor (DEXCOM G6 SENSOR) MISC  Spouse/Significant Other No No   Sig: Use as directed for continuous glucose monitoring   Change every 10 days   Patient not taking: Reported on 12/13/2021    Continuous Blood Gluc Transmit (DEXCOM G6 TRANSMITTER) MISC  Spouse/Significant Other No No   Sig: Use as directed for continuous glucose monitoring-Change every 3 months   Patient not taking: Reported on 12/13/2021    Incontinence Supplies (MALE URINAL) MISC Spouse/Significant Other No No   Sig: by Does not apply route daily   Insulin Syringe-Needle U-100 (B-D INS SYRINGE 0 5CC/30GX1/2") 30G X 1/2" 0 5 ML MISC  Spouse/Significant Other No No   Sig: Inject under the skin 4 (four) times a day   Lantus 100 UNIT/ML subcutaneous injection   No No   Sig: INJECT 14 UNITS UNDER THE SKIN DAILY   ONETOUCH DELICA LANCETS 91N MISC  Spouse/Significant Other No No   Sig: by Does not apply route 3 (three) times a day   aspirin (ECOTRIN LOW STRENGTH) 81 mg EC tablet  Spouse/Significant Other Yes No   Sig: Take 81 mg by mouth daily Resume on 8/14   Patient not taking: Reported on 12/13/2021    atorvastatin (LIPITOR) 40 mg tablet   No No   Sig: Take 1 tablet (40 mg total) by mouth daily with dinner   carvedilol (COREG) 6 25 mg tablet  Spouse/Significant Other No No   Sig: Take 1 tablet (6 25 mg total) by mouth 2 (two) times a day with meals   escitalopram (LEXAPRO) 5 mg tablet   No No   Sig: TAKE 1 TABLET BY MOUTH EVERYDAY AT BEDTIME   glucose blood (True Metrix Blood Glucose Test) test strip  Spouse/Significant Other No No   Sig: Use 1 each 3 (three) times a day Use as instructed   insulin lispro (HumaLOG KwikPen) 100 units/mL injection pen   No No   Sig: INJECT 4 UNITS 3 TIMES A DAY WITH MEALS PLUS SCALE   nystatin (MYCOSTATIN) powder   No No   Sig: Apply topically 2 (two) times a day for 10 days   Patient not taking: Reported on 12/13/2021    ofloxacin (OCUFLOX) 0 3 % ophthalmic solution   No No   Sig: Administer 1 drop in left eye every 4 hours while awake for 2 days, then 1 drop 4 times daily on days 3 through 7      Facility-Administered Medications: None       Past Medical History:   Diagnosis Date    Chronic kidney disease     Diabetes mellitus ([facility] [address] )     GERD (gastroesophageal reflux disease)     Hypercholesteremia     Hyperlipidemia     Hypertension     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    Neuropathy     Obesity     Osteomyelitis ([facility] [address] )     last assessed 11/4/16  PVC's (premature ventricular contractions)     sees cardiology Dr Leonel camargo    Stroke [facility])     last weeof July 2018 [address] [facility]       Past Surgical History:   Procedure Laterality Date    ABDOMINAL SURGERY      CHOLECYSTECTOMY      Percutaneous    COLONOSCOPY      CYSTOSCOPY      OTHER SURGICAL HISTORY      "stimulator to control bowel movements"    MD ESOPHAGOGASTRODUODENOSCOPY TRANSORAL DIAGNOSTIC N/A 9/27/2016    Procedure: ESOPHAGOGASTRODUODENOSCOPY (EGD); Surgeon: Essie Garcia MD;  [facility]; Service: Gastroenterology    MD LAP,CHOLECYSTECTOMY N/A 2/29/2016    Procedure: LAPAROSCOPIC CHOLECYSTECTOMY ;  Surgeon: Tawana Calderon DO;  [facility];  Service: General    ROTATOR CUFF REPAIR Right     TOE AMPUTATION Right 10/28/2016    Procedure: 3RD TOE AMPUTATION ;  Surgeon: Leora Bansal DPM;  [facility];  Service:        Family History   Problem Relation Age of Onset    Leukemia Mother     Liver disease Mother     Lung cancer Mother         heavy smoker - 3 ppd    Heart disease Father     Liver disease Father     Multiple myeloma Sister     Breast cancer Sister     Urolithiasis Family     Alcohol abuse Neg Hx     Depression Neg Hx     Drug abuse Neg Hx     Substance Abuse Neg Hx     Mental illness Neg Hx      I have reviewed and agree with the history as documented  E-Cigarette/Vaping    E-Cigarette Use Never User      E-Cigarette/Vaping Substances    Nicotine No     THC No     CBD No     Flavoring No     Other No     Unknown No      Social History     Tobacco Use    Smoking status: Never Smoker    Smokeless tobacco: Never Used   Vaping Use    Vaping Use: Never used   Substance Use Topics    Alcohol use: Not Currently    Drug use: No       Review of Systems   Constitutional: Positive for activity change and fatigue  Negative for chills and fever  HENT: Negative for ear pain and sore throat      Eyes: Negative for pain and visual disturbance  Respiratory: Negative for cough and shortness of breath  Cardiovascular: Negative for chest pain and palpitations  Gastrointestinal: Positive for abdominal pain, diarrhea and vomiting  Genitourinary: Negative for dysuria and hematuria  Musculoskeletal: Negative for arthralgias and back pain  Skin: Negative for color change and rash  Neurological: Negative for seizures and syncope  All other systems reviewed and are negative  Physical Exam  Physical Exam  Vitals and nursing note reviewed  Constitutional:       Appearance: He is well-developed  HENT:      Head: Normocephalic and atraumatic  Mouth/Throat:      Mouth: Mucous membranes are dry  Eyes:      Conjunctiva/sclera: Conjunctivae normal    Cardiovascular:      Rate and Rhythm: Normal rate and regular rhythm  Heart sounds: No murmur heard  Pulmonary:      Effort: Pulmonary effort is normal  No respiratory distress  Breath sounds: Normal breath sounds  Abdominal:      General: There is distension  Palpations: Abdomen is soft  Tenderness: There is abdominal tenderness  Musculoskeletal:      Cervical back: Neck supple  Comments: Dialysis fistula noted in left arm   Skin:     General: Skin is warm and dry  Neurological:      Mental Status: He is alert           Vital Signs  ED Triage Vitals [01/01/22 2155]   Temperature Pulse Respirations Blood Pressure SpO2   99 4 °F (37 4 °C) 97 18 138/76 98 %      Temp Source Heart Rate Source Patient Position - Orthostatic VS BP Location FiO2 (%)   Oral Monitor Sitting Right arm --      Pain Score       8           Vitals:    01/01/22 2155 01/02/22 0213 01/02/22 0227   BP: 138/76 (!) 181/86 140/72   Pulse: 97 92 94   Patient Position - Orthostatic VS: Sitting Lying Lying         ED Medications  Medications   acetaminophen (TYLENOL) tablet 650 mg (650 mg Oral Given 1/2/22 0153)   iodixanol (VISIPAQUE) 320 MG/ML injection 100 mL (100 mL Intravenous Given 1/2/22 0147)   ondansetron (ZOFRAN) injection 4 mg (4 mg Intravenous Given 1/2/22 0255)   sodium chloride 0 9 % bolus 250 mL (0 mL Intravenous Stopped 1/2/22 0435)   ciprofloxacin (CIPRO) IVPB (premix in 5% dextrose) 400 mg 200 mL (0 mg Intravenous Stopped 1/2/22 0443)       Diagnostic Studies  Results Reviewed     Procedure Component Value Units Date/Time    HS Troponin I 4hr [[id number]] Collected: 01/02/22 0502    Lab Status: Final result Specimen: Blood from Arm, Right Updated: 01/02/22 0536     hs TnI 4hr 28 ng/L      Delta 4hr hsTnI -1 ng/L     Clostridium difficile toxin by PCR with EIA [[id number]] Collected: 01/02/22 0503    Lab Status: In process Specimen: Stool from Per Rectum Updated: 01/02/22 0509    HS Troponin I 2hr [[id number]] Collected: 01/02/22 0255    Lab Status: Final result Specimen: Blood from Arm, Right Updated: 01/02/22 0355     hs TnI 2hr 27 ng/L      Delta 2hr hsTnI -2 ng/L     Magnesium [[id number]]  (Normal) Collected: 01/02/22 0034    Lab Status: Final result Specimen: Blood Updated: 01/02/22 0150     Magnesium 1 9 mg/dL     COVID/FLU/RSV [[id number]]  (Normal) Collected: 01/02/22 0043    Lab Status: Final result Specimen: Nares from Nasopharyngeal Swab Updated: 01/02/22 0143     SARS-CoV-2 Negative     INFLUENZA A PCR Negative     INFLUENZA B PCR Negative     RSV PCR Negative    Narrative:      FOR PEDIATRIC PATIENTS - copy/paste COVID Guidelines URL to browser: [email or web address]     This test has been authorized by FDA under an EUA (Emergency Use Assay) for use by authorized laboratories  Clinical caution and judgement should be used with the interpretation of these results with consideration of the clinical impression and other laboratory testing  Testing reported as "Positive" or "Negative" has been proven to be accurate according to standard laboratory validation requirements    All testing is performed with control materials showing appropriate reactivity at standard intervals  NT-BNP PRO [[id number]]  (Abnormal) Collected: 01/02/22 0034    Lab Status: Final result Specimen: Blood Updated: 01/02/22 0135     NT-proBNP 1,859 pg/mL     Lactic acid, plasma [[id number]]  (Normal) Collected: 01/02/22 0040    Lab Status: Final result Specimen: Blood from Arm, Right Updated: 01/02/22 0132     LACTIC ACID 1 2 mmol/L     Narrative:      Result may be elevated if tourniquet was used during collection  HS Troponin 0hr (reflex protocol) [[id number]]  (Normal) Collected: 01/02/22 0040    Lab Status: Final result Specimen: Blood from Arm, Right Updated: 01/02/22 0119     hs TnI 0hr 29 ng/L     Blood culture #1 [[id number]] Collected: 01/02/22 0043    Lab Status: In process Specimen: Blood from Arm, Right Updated: 01/02/22 0053    Blood culture #2 [[id number]] Collected: 01/02/22 0040    Lab Status:  In process Specimen: Blood from Arm, Right Updated: 01/02/22 0053    UA (URINE) with reflex to Scope [[id number]]     Lab Status: No result Specimen: Urine     Stool Enteric Bacterial Panel by PCR [[id number]]     Lab Status: No result Specimen: Stool     Urine culture [[id number]]     Lab Status: No result Specimen: Urine     Lipase [[id number]]  (Normal) Collected: 01/01/22 2158    Lab Status: Final result Specimen: Blood from Arm, Right Updated: 01/01/22 2223     Lipase 187 u/L     Comprehensive metabolic panel [[id number]]  (Abnormal) Collected: 01/01/22 2158    Lab Status: Final result Specimen: Blood from Arm, Right Updated: 01/01/22 2223     Sodium 135 mmol/L      Potassium 4 6 mmol/L      Chloride 94 mmol/L      CO2 30 mmol/L      ANION GAP 11 mmol/L      BUN 53 mg/dL      Creatinine 6 78 mg/dL      Glucose 186 mg/dL      Calcium 9 1 mg/dL      AST 13 U/L      ALT 28 U/L      Alkaline Phosphatase 99 U/L      Total Protein 8 0 g/dL      Albumin 4 0 g/dL      Total Bilirubin 0 69 mg/dL      eGFR 7 ml/min/1 73sq m     Narrative: National Kidney Disease Foundation guidelines for Chronic Kidney Disease (CKD):     Stage 1 with normal or high GFR (GFR > 90 mL/min/1 73 square meters)    Stage 2 Mild CKD (GFR = 60-89 mL/min/1 73 square meters)    Stage 3A Moderate CKD (GFR = 45-59 mL/min/1 73 square meters)    Stage 3B Moderate CKD (GFR = 30-44 mL/min/1 73 square meters)    Stage 4 Severe CKD (GFR = 15-29 mL/min/1 73 square meters)    Stage 5 End Stage CKD (GFR <15 mL/min/1 73 square meters)  Note: GFR calculation is accurate only with a steady state creatinine    CBC and differential [[id number]]  (Abnormal) Collected: 01/01/22 2158    Lab Status: Final result Specimen: Blood from Arm, Right Updated: 01/01/22 2212     WBC 11 11 Thousand/uL      RBC 4 15 Million/uL      Hemoglobin 12 8 g/dL      Hematocrit 39 1 %      MCV 94 fL      MCH 30 8 pg      MCHC 32 7 g/dL      RDW 14 0 %      MPV 9 9 fL      Platelets 741 Thousands/uL      nRBC 0 /100 WBCs      Neutrophils Relative 85 %      Immat GRANS % 1 %      Lymphocytes Relative 5 %      Monocytes Relative 8 %      Eosinophils Relative 1 %      Basophils Relative 0 %      Neutrophils Absolute 9 51 Thousands/µL      Immature Grans Absolute 0 08 Thousand/uL      Lymphocytes Absolute 0 59 Thousands/µL      Monocytes Absolute 0 83 Thousand/µL      Eosinophils Absolute 0 08 Thousand/µL      Basophils Absolute 0 02 Thousands/µL                  PE Study with CT abdomen & pelvis with contrast   Final Result by Truong Peña MD (01/02 0250)      Limited evaluation for distal pulmonary arterial emboli secondary to motion  No evidence of central/obstructing or proximal pulmonary embolus  No consolidation or effusion  Mildly fluid distended loops of small bowel without discrete transition, nonspecific  Correlate clinically for nonspecific enteritis and/or ileus in the appropriate setting  Stable hepatosplenomegaly           Workstation performed: [id number] MDM  Number of Diagnoses or Management Options  Diarrhea: new and requires workup  Gastroenteritis: new and requires workup  Vomiting: new and requires workup  Weakness: new and requires workup     Amount and/or Complexity of Data Reviewed  Clinical lab tests: ordered and reviewed  Tests in the radiology section of CPT®: ordered and reviewed    Patient Progress  Patient progress: stable      Disposition  Final diagnoses:   Gastroenteritis   Diarrhea   Vomiting   Weakness     Time reflects when diagnosis was documented in both MDM as applicable and the Disposition within this note     Time User Action Codes Description Comment    1/2/2022  3:42 AM Ferris Baas Add [K52 9] Gastroenteritis     1/2/2022  3:43 AM Ferris Baas Add [R19 7] Diarrhea     1/2/2022  3:43 AM Ferris Baas Add [R11 10] Vomiting     1/2/2022  3:43 AM Ferris Baas Add [R53 1] Weakness       ED Disposition     ED Disposition Condition Date/Time Comment    Admit Stable Sun Jan 2, 2022  3:42 AM Case was discussed with MICKEY and the patient's admission status was agreed to be Admission Status: inpatient status to the service of Dr Chapo Reed   Follow-up Information    None         Patient's Medications   Discharge Prescriptions    No medications on file       No discharge procedures on file      PDMP Review       Value Time User    PDMP Reviewed  Yes 12/30/2020 10:40 PM Yosef Vidales MD          ED Provider  Electronically Signed by           Star Garcia PA-C  01/02/22 6286

## 2022-01-02 NOTE — H&P
[facility]  H&P- Keyla Bermudez 1960, 64 y o  male MRN: [id number]  Unit/Bed#: FT 02 Encounter: [id number]  Primary Care Provider: Viridiana Blake DO   Date and time admitted to hospital: 1/2/2022 12:00 AM    * Enteritis  Assessment & Plan  · Presented with generalized abdominal pain, nausea/ vomiting and diarrhea since yesterday  · CT A/P: "Mildly fluid distended loops of small bowel without discrete transition, nonspecific  Correlate clinically for nonspecific enteritis and/or ileus in the appropriate setting"  · Received 1 dose of IV cipro in the ED  Hold off on further antibiotics  · Obtain stool studies  · Monitor output  · PRN IV Zofran  · Received 250 mL bolus in the ED  · Clear liquid diet as tolerated for now  · Consider GI evaluation if symptoms persist     Leukocytosis  Assessment & Plan  · Mild, POA, with WBC 11 11   · In the setting of suspected enteritis  · Does not meet sepsis criteria on admission  · Repeat CBC in AM     ESRD on dialysis [facility])  Assessment & Plan  Lab Results   Component Value Date    EGFR 7 01/01/2022    EGFR 7 09/08/2021    EGFR 8 07/14/2021    CREATININE 6 78 (H) 01/01/2022    CREATININE 7 39 (H) 09/08/2021    CREATININE 6 96 (H) 07/14/2021     · On HD MWF  · Nephrology consulted  History of stroke  Assessment & Plan  · History of left MCA CVA in 2018  · No residual deficits  Cognitive impairment noted, patient is a poor historian  Diabetic polyneuropathy associated with type 2 diabetes mellitus [facility])  Assessment & Plan  Lab Results   Component Value Date    HGBA1C 6 4 12/09/2021       No results for input(s): POCGLU in the last 72 hours  Blood Sugar Average: Last 72 hrs:     Monitor accu-checks AC and HS  Home insulin regimen includes Lantus 14 units daily; decreased to 7 units given decreased PO intake  Hold mealtime Humalog until PO intake improves  SSI coverage  Hypoglycemia protocol      VTE Pharmacologic Prophylaxis: VTE Score: 4 Moderate Risk (Score 3-4) - Pharmacological DVT Prophylaxis Ordered: heparin  Code Status: Level 1- full code  Discussion with family: Updated  (wife) at bedside  Anticipated Length of Stay: Patient will be admitted on an inpatient basis with an anticipated length of stay of greater than 2 midnights secondary to enteritis, need for further work-up/ stool studies  Total Time for Visit, including Counseling / Coordination of Care: 70 minutes Greater than 50% of this total time spent on direct patient counseling and coordination of care  Chief Complaint: abdominal pain, n/v/d    History of Present Illness:  Michelle Villatoro is a 64 y o  male with a PMH of ESRD on HD, type 2 DM, history of CVA and cognitive impairment who presents with abdominal pain, n/v/d  Patient reports generalized abdominal pain and nausea with numerous episodes of vomiting and non-bloody diarrhea for the past day  Patient denies fevers/ chills, cough, SOB or chest pain  He reports decreased PO intake due to nausea/ vomiting  He denies any known sick contacts, recent antibiotic use or recent hospitalizations  Review of Systems:  Review of Systems   Constitutional: Positive for appetite change  Negative for chills and fever  Respiratory: Negative for cough and shortness of breath  Cardiovascular: Negative for chest pain  Gastrointestinal: Positive for abdominal pain, diarrhea, nausea and vomiting  Genitourinary: Negative for difficulty urinating  All other systems reviewed and are negative        Past Medical and Surgical History:   Past Medical History:   Diagnosis Date    Chronic kidney disease     [facility]     GERD (gastroesophageal reflux disease)     Hypercholesteremia     Hyperlipidemia     Hypertension     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    Neuropathy     Obesity     [facility]     last assessed 11/4/16    PVC's (premature ventricular contractions)     sees cardiology Dr Radha camargo    Stroke [facility])     last weeof July 2018 [address] [facility]       Past Surgical History:   Procedure Laterality Date    ABDOMINAL SURGERY      CHOLECYSTECTOMY      Percutaneous    COLONOSCOPY      CYSTOSCOPY      OTHER SURGICAL HISTORY      "stimulator to control bowel movements"    DC ESOPHAGOGASTRODUODENOSCOPY TRANSORAL DIAGNOSTIC N/A 9/27/2016    Procedure: ESOPHAGOGASTRODUODENOSCOPY (EGD); Surgeon: Wojciech Hatfield MD;  Location: [facility]; Service: Gastroenterology    DC LAP,CHOLECYSTECTOMY N/A 2/29/2016    Procedure: LAPAROSCOPIC CHOLECYSTECTOMY ;  Surgeon: Inés Mancia DO;  Location: [facility];  Service: General    ROTATOR CUFF REPAIR Right     TOE AMPUTATION Right 10/28/2016    Procedure: 3RD TOE AMPUTATION ;  Surgeon: Elena Berrios DPM;  Location: [facility];  Service:        Meds/Allergies:  Prior to Admission medications    Medication Sig Start Date End Date Taking? Authorizing Provider   atorvastatin (LIPITOR) 40 mg tablet Take 1 tablet (40 mg total) by mouth daily with dinner 12/13/21  Yes Carmelita Garcia DO   BD Pen Needle Adina U/F 32G X 4 MM MISC USE TO INJECT INSULIN 4 TIMES DAILY 5/19/21  Yes Lissette Brennan PA-C   Blood Glucose Monitoring Suppl (True Metrix Meter) w/Device KIT Use to test blood sugars 3 times daily 6/24/21  Yes Lissette Brennan PA-C   Blood Pressure Monitoring (BLOOD PRESSURE CUFF) MISC Use to check blood pressure before taking blood pressure medication and 1 hour after and follow instructions provided in discharge instructions based on the readings   8/13/18  Yes Anthony Nicholson MD   calcium acetate (CALPHRON) 667 mg TAKE 2 TABLETS BY MOUTH THREE TIMES DAILY WITH MEALS 10/17/21  Yes Historical Provider, MD   carvedilol (COREG) 6 25 mg tablet Take 1 tablet (6 25 mg total) by mouth 2 (two) times a day with meals  Patient taking differently: Take 6 25 mg by mouth 2 (two) times a day with meals Every evening and lacey-tu-th-sa morning  8/10/21  Yes Raj Auguste DO   Cholecalciferol (Vitamin D3) 1 25 MG (11369 UT) CAPS TAKE 1 CAPSULE BY MOUTH ONE TIME PER WEEK 10/6/21  Yes Rosie Carrasquillo MD   Continuous Blood Gluc  (DEXCOM G6 ) LANCE Use as directed for continuous glucose monitoring 10/28/19  Yes Lissette Brennan PA-C   Continuous Blood Gluc Sensor (DEXCOM G6 SENSOR) MISC Use as directed for continuous glucose monitoring   Change every 10 days 10/28/19  Yes Lissette Brennan PA-C   Continuous Blood Gluc Transmit (DEXCOM G6 TRANSMITTER) MISC Use as directed for continuous glucose monitoring-Change every 3 months 10/28/19  Yes Lissette Brennan PA-C   escitalopram (LEXAPRO) 5 mg tablet TAKE 1 TABLET BY MOUTH EVERYDAY AT BEDTIME 12/20/21  Yes Raj Auguste DO   glucose blood (True Metrix Blood Glucose Test) test strip Use 1 each 3 (three) times a day Use as instructed 2/25/21  Yes Lissette Brennan PA-C   Incontinence Supplies (MALE URINAL) MISC by Does not apply route daily 9/24/18  Yes Raj Auguste DO   insulin lispro (HumaLOG KwikPen) 100 units/mL injection pen INJECT 4 UNITS 3 TIMES A DAY WITH MEALS PLUS SCALE 11/9/21  Yes Lissette Brennan PA-C   Insulin Syringe-Needle U-100 (B-D INS SYRINGE 0 5CC/30GX1/2") 30G X 1/2" 0 5 ML MISC Inject under the skin 4 (four) times a day 6/5/20  Yes Quita Nageotte, MD   Lantus 100 UNIT/ML subcutaneous injection INJECT 14 UNITS UNDER THE SKIN DAILY 10/20/21  Yes Lissette Brennan PA-C   ONETOUCH DELICA LANCETS 54D MISC by Does not apply route 3 (three) times a day 11/27/18  Yes Raj Auguste DO   TORSEMIDE PO 50 mg   10/25/21  Yes Historical Provider, MD   aspirin (ECOTRIN LOW STRENGTH) 81 mg EC tablet Take 81 mg by mouth daily Resume on 8/14  Patient not taking: Reported on 12/13/2021     Historical Provider, MD   nystatin (MYCOSTATIN) powder Apply topically 2 (two) times a day for 10 days  Patient not taking: Reported on 12/13/2021 1/6/21 12/13/21  Jericho Ibarra PA-C   ofloxacin (OCUFLOX) 0 3 % ophthalmic solution Administer 1 drop in left eye every 4 hours while awake for 2 days, then 1 drop 4 times daily on days 3 through 7  Patient not taking: Reported on 1/2/2022 12/30/21   Rajkishor Thomas,      I have reviewed home medications with a medical source (PCP, Pharmacy, other)  Allergies: No Known Allergies    Social History:  Marital Status: /Civil Union   Patient Pre-hospital Living Situation: Home  Patient Pre-hospital Level of Mobility: walks  Patient Pre-hospital Diet Restrictions: renal diet  Substance Use History:   Social History     Substance and Sexual Activity   Alcohol Use Not Currently     Social History     Tobacco Use   Smoking Status Never Smoker   Smokeless Tobacco Never Used     Social History     Substance and Sexual Activity   Drug Use No       Family History:  Family History   Problem Relation Age of Onset    Leukemia Mother     Liver disease Mother     Lung cancer Mother         heavy smoker - 3 ppd    Heart disease Father     Liver disease Father     Multiple myeloma Sister     Breast cancer Sister     Urolithiasis Family     Alcohol abuse Neg Hx     Depression Neg Hx     Drug abuse Neg Hx     Substance Abuse Neg Hx     Mental illness Neg Hx        Physical Exam:     Vitals:   Blood Pressure: 140/72 (01/02/22 0227)  Pulse: 94 (01/02/22 0227)  Temperature: 99 4 °F (37 4 °C) (01/01/22 2155)  Temp Source: Oral (01/01/22 2155)  Respirations: 18 (01/02/22 0227)  SpO2: 96 % (01/02/22 0227)    Physical Exam  Vitals and nursing note reviewed  Constitutional:       General: He is not in acute distress  Appearance: He is not diaphoretic  HENT:      Head: Normocephalic and atraumatic  Eyes:      Conjunctiva/sclera: Conjunctivae normal    Cardiovascular:      Rate and Rhythm: Normal rate and regular rhythm  Pulmonary:      Effort: Pulmonary effort is normal  No respiratory distress  Breath sounds: Normal breath sounds     Abdominal:      General: Bowel sounds are normal  There is distension  Palpations: Abdomen is soft  Tenderness: There is no abdominal tenderness  Musculoskeletal:      Right lower leg: No edema  Left lower leg: No edema  Skin:     General: Skin is warm and dry  Coloration: Skin is not pale  Neurological:      Mental Status: He is alert  Mental status is at baseline  Psychiatric:         Mood and Affect: Mood normal           Additional Data:     Lab Results:  Results from last 7 days   Lab Units 01/01/22  2158   WBC Thousand/uL 11 11*   HEMOGLOBIN g/dL 12 8   HEMATOCRIT % 39 1   PLATELETS Thousands/uL 153   NEUTROS PCT % 85*   LYMPHS PCT % 5*   MONOS PCT % 8   EOS PCT % 1     Results from last 7 days   Lab Units 01/01/22  2158   SODIUM mmol/L 135*   POTASSIUM mmol/L 4 6   CHLORIDE mmol/L 94*   CO2 mmol/L 30   BUN mg/dL 53*   CREATININE mg/dL 6 78*   ANION GAP mmol/L 11   CALCIUM mg/dL 9 1   ALBUMIN g/dL 4 0   TOTAL BILIRUBIN mg/dL 0 69   ALK PHOS U/L 99   ALT U/L 28   AST U/L 13   GLUCOSE RANDOM mg/dL 186*                 Results from last 7 days   Lab Units 01/02/22  0040   LACTIC ACID mmol/L 1 2       Imaging: Reviewed radiology reports from this admission including: abdominal/pelvic CT  PE Study with CT abdomen & pelvis with contrast   Final Result by Franck Cooney MD (01/02 2374)      Limited evaluation for distal pulmonary arterial emboli secondary to motion  No evidence of central/obstructing or proximal pulmonary embolus  No consolidation or effusion  Mildly fluid distended loops of small bowel without discrete transition, nonspecific  Correlate clinically for nonspecific enteritis and/or ileus in the appropriate setting  Stable hepatosplenomegaly  Workstation performed: [id number]             EKG and Other Studies Reviewed on Admission:   · EKG: NSR  HR 96  RBBB  LAFB       ** Please Note: This note has been constructed using a voice recognition system   **

## 2022-01-02 NOTE — ASSESSMENT & PLAN NOTE
· Mild, POA, with WBC 11 11   · In the setting of suspected enteritis  · Does not meet sepsis criteria on admission     · Repeat CBC in AM

## 2022-01-02 NOTE — ED NOTES
Pt very demanding, does very little to help himself  Angry because his toast and coffee didn't come right away  Requested a bedside chair which was put in his room  Sat for a few minutes then back to bed  Had a loose bowel movement in the bed with the commode at bedside         Shania Yeboah RN  01/02/22 4821

## 2022-01-02 NOTE — ED NOTES
Having large amts of foul smelling liquid stool in brief, pt cleaned up commode taken to bedside  Had more liquid stool  Denies nausea no vomiting  Requesting to eat  Dr Erik Oro at bedside       Wendi Vallecillo RN  01/02/22 4777

## 2022-01-02 NOTE — ASSESSMENT & PLAN NOTE
Lab Results   Component Value Date    HGBA1C 6 4 12/09/2021       No results for input(s): POCGLU in the last 72 hours  Blood Sugar Average: Last 72 hrs:     Monitor accu-checks AC and HS  Home insulin regimen includes Lantus 14 units daily; decreased to 7 units given decreased PO intake  Hold mealtime Humalog until PO intake improves  SSI coverage  Hypoglycemia protocol

## 2022-01-02 NOTE — ASSESSMENT & PLAN NOTE
Lab Results   Component Value Date    EGFR 7 01/01/2022    EGFR 7 09/08/2021    EGFR 8 07/14/2021    CREATININE 6 78 (H) 01/01/2022    CREATININE 7 39 (H) 09/08/2021    CREATININE 6 96 (H) 07/14/2021     · On HD MWF  · Nephrology consulted

## 2022-01-02 NOTE — ASSESSMENT & PLAN NOTE
· History of left MCA CVA in 2018  · No residual deficits  Cognitive impairment noted, patient is a poor historian

## 2022-01-02 NOTE — ASSESSMENT & PLAN NOTE
· Presented with generalized abdominal pain, nausea/ vomiting and diarrhea since yesterday  · CT A/P: "Mildly fluid distended loops of small bowel without discrete transition, nonspecific  Correlate clinically for nonspecific enteritis and/or ileus in the appropriate setting"  · Received 1 dose of IV cipro in the ED  Hold off on further antibiotics  · Obtain stool studies  · Monitor output  · PRN IV Zofran  · Received 250 mL bolus in the ED  · Clear liquid diet as tolerated for now     · Consider GI evaluation if symptoms persist

## 2022-01-03 ENCOUNTER — APPOINTMENT (INPATIENT)
Dept: DIALYSIS | Facility: HOSPITAL | Age: 62
DRG: 391 | End: 2022-01-03
Payer: MEDICARE

## 2022-01-03 VITALS
OXYGEN SATURATION: 97 % | SYSTOLIC BLOOD PRESSURE: 109 MMHG | HEIGHT: 69 IN | BODY MASS INDEX: 34.07 KG/M2 | TEMPERATURE: 97.7 F | RESPIRATION RATE: 18 BRPM | WEIGHT: 230 LBS | DIASTOLIC BLOOD PRESSURE: 88 MMHG | HEART RATE: 103 BPM

## 2022-01-03 LAB
ANION GAP SERPL CALCULATED.3IONS-SCNC: 13 MMOL/L (ref 4–13)
BASOPHILS # BLD AUTO: 0.01 THOUSANDS/ΜL (ref 0–0.1)
BASOPHILS NFR BLD AUTO: 0 % (ref 0–1)
BUN SERPL-MCNC: 68 MG/DL (ref 5–25)
C DIFF TOX B TCDB STL QL NAA+PROBE: NEGATIVE
CALCIUM SERPL-MCNC: 8.5 MG/DL (ref 8.3–10.1)
CHLORIDE SERPL-SCNC: 97 MMOL/L (ref 100–108)
CO2 SERPL-SCNC: 27 MMOL/L (ref 21–32)
CREAT SERPL-MCNC: 8.22 MG/DL (ref 0.6–1.3)
EOSINOPHIL # BLD AUTO: 0.11 THOUSAND/ΜL (ref 0–0.61)
EOSINOPHIL NFR BLD AUTO: 3 % (ref 0–6)
ERYTHROCYTE [DISTWIDTH] IN BLOOD BY AUTOMATED COUNT: 14.3 % (ref 11.6–15.1)
GFR SERPL CREATININE-BSD FRML MDRD: 6 ML/MIN/1.73SQ M
GLUCOSE SERPL-MCNC: 124 MG/DL (ref 65–140)
GLUCOSE SERPL-MCNC: 124 MG/DL (ref 65–140)
GLUCOSE SERPL-MCNC: 132 MG/DL (ref 65–140)
HCT VFR BLD AUTO: 35 % (ref 36.5–49.3)
HGB BLD-MCNC: 11.3 G/DL (ref 12–17)
IMM GRANULOCYTES # BLD AUTO: 0.06 THOUSAND/UL (ref 0–0.2)
IMM GRANULOCYTES NFR BLD AUTO: 2 % (ref 0–2)
LYMPHOCYTES # BLD AUTO: 0.89 THOUSANDS/ΜL (ref 0.6–4.47)
LYMPHOCYTES NFR BLD AUTO: 23 % (ref 14–44)
MCH RBC QN AUTO: 30.5 PG (ref 26.8–34.3)
MCHC RBC AUTO-ENTMCNC: 32.3 G/DL (ref 31.4–37.4)
MCV RBC AUTO: 95 FL (ref 82–98)
MONOCYTES # BLD AUTO: 0.78 THOUSAND/ΜL (ref 0.17–1.22)
MONOCYTES NFR BLD AUTO: 20 % (ref 4–12)
NEUTROPHILS # BLD AUTO: 2.08 THOUSANDS/ΜL (ref 1.85–7.62)
NEUTS SEG NFR BLD AUTO: 52 % (ref 43–75)
NRBC BLD AUTO-RTO: 0 /100 WBCS
PLATELET # BLD AUTO: 143 THOUSANDS/UL (ref 149–390)
PMV BLD AUTO: 10.7 FL (ref 8.9–12.7)
POTASSIUM SERPL-SCNC: 4.1 MMOL/L (ref 3.5–5.3)
RBC # BLD AUTO: 3.7 MILLION/UL (ref 3.88–5.62)
SODIUM SERPL-SCNC: 137 MMOL/L (ref 136–145)
WBC # BLD AUTO: 3.93 THOUSAND/UL (ref 4.31–10.16)

## 2022-01-03 PROCEDURE — 80048 BASIC METABOLIC PNL TOTAL CA: CPT | Performed by: INTERNAL MEDICINE

## 2022-01-03 PROCEDURE — 36415 COLL VENOUS BLD VENIPUNCTURE: CPT | Performed by: INTERNAL MEDICINE

## 2022-01-03 PROCEDURE — 5A1D70Z PERFORMANCE OF URINARY FILTRATION, INTERMITTENT, LESS THAN 6 HOURS PER DAY: ICD-10-PCS | Performed by: INTERNAL MEDICINE

## 2022-01-03 PROCEDURE — 82948 REAGENT STRIP/BLOOD GLUCOSE: CPT

## 2022-01-03 PROCEDURE — 99239 HOSP IP/OBS DSCHRG MGMT >30: CPT | Performed by: INTERNAL MEDICINE

## 2022-01-03 PROCEDURE — 99254 IP/OBS CNSLTJ NEW/EST MOD 60: CPT | Performed by: PHYSICIAN ASSISTANT

## 2022-01-03 PROCEDURE — 85025 COMPLETE CBC W/AUTO DIFF WBC: CPT | Performed by: INTERNAL MEDICINE

## 2022-01-03 RX ORDER — LOPERAMIDE HYDROCHLORIDE 2 MG/1
4 CAPSULE ORAL 4 TIMES DAILY PRN
Status: DISCONTINUED | OUTPATIENT
Start: 2022-01-03 | End: 2022-01-03 | Stop reason: HOSPADM

## 2022-01-03 RX ADMIN — HEPARIN SODIUM 5000 UNITS: 5000 INJECTION INTRAVENOUS; SUBCUTANEOUS at 05:14

## 2022-01-03 RX ADMIN — DOXERCALCIFEROL 4 MCG: 4 INJECTION, SOLUTION INTRAVENOUS at 11:27

## 2022-01-03 RX ADMIN — CALCIUM ACETATE 1334 MG: 667 CAPSULE ORAL at 13:01

## 2022-01-03 NOTE — HEMODIALYSIS
Post-Dialysis RN Treatment Note    Blood Pressure:  Pre 191/125  83 mm/Hg  [id number]  /103 mmHg   EDW  103kg    Weight:  Pre 104 kg   Post  103 5   kg   Mode of weight measurement:    Volume Removed  500  ml    Treatment duration 150 minutes    NS given     Treatment shortened?  Censes directed, 150min   Medications given during Rx Hectorol 4mcg   Estimated Kt/V  NA   Access type: AVF   Access Issues:  Npne   Report called to primary nurse   Malorie Whittington RN

## 2022-01-03 NOTE — PLAN OF CARE
Problem: MOBILITY - ADULT  Goal: Maintain or return to baseline ADL function  Description: INTERVENTIONS:  -  Assess patient's ability to carry out ADLs; assess patient's baseline for ADL function and identify physical deficits which impact ability to perform ADLs (bathing, care of mouth/teeth, toileting, grooming, dressing, etc )  - Assess/evaluate cause of self-care deficits   - Assess range of motion  - Assess patient's mobility; develop plan if impaired  - Assess patient's need for assistive devices and provide as appropriate  - Encourage maximum independence but intervene and supervise when necessary  - Involve family in performance of ADLs  - Assess for home care needs following discharge   - Consider OT consult to assist with ADL evaluation and planning for discharge  - Provide patient education as appropriate  1/3/2022 1210 by Chana Banks RN  Outcome: Adequate for Discharge  1/3/2022 1208 by Chana Banks RN  Outcome: Progressing  Goal: Maintains/Returns to pre admission functional level  Description: INTERVENTIONS:  - Perform BMAT or MOVE assessment daily    - Set and communicate daily mobility goal to care team and patient/family/caregiver     - Collaborate with rehabilitation services on mobility goals if consulted    - Record patient progress and toleration of activity level   1/3/2022 1210 by Chana Banks RN  Outcome: Adequate for Discharge  1/3/2022 1208 by Chana Banks RN  Outcome: Progressing     Problem: Potential for Falls  Goal: Patient will remain free of falls  Description: INTERVENTIONS:  - Educate patient/family on patient safety including physical limitations  - Instruct patient to call for assistance with activity   - Consult OT/PT to assist with strengthening/mobility   - Keep Call bell within reach  - Keep bed low and locked with side rails adjusted as appropriate  - Keep care items and personal belongings within reach  - Initiate and maintain comfort rounds  - Make Fall Risk Sign visible to staff    - Apply yellow socks and bracelet for high fall risk patients  - Consider moving patient to room near nurses station  1/3/2022 1210 by David Pratt RN  Outcome: Adequate for Discharge  1/3/2022 1208 by David Pratt RN  Outcome: Progressing     Problem: Nutrition/Hydration-ADULT  Goal: Nutrient/Hydration intake appropriate for improving, restoring or maintaining nutritional needs  Description: Monitor and assess patient's nutrition/hydration status for malnutrition  Collaborate with interdisciplinary team and initiate plan and interventions as ordered  Monitor patient's weight and dietary intake as ordered or per policy  Utilize nutrition screening tool and intervene as necessary  Determine patient's food preferences and provide high-protein, high-caloric foods as appropriate       INTERVENTIONS:  - Monitor oral intake, urinary output, labs, and treatment plans  - Assess nutrition and hydration status and recommend course of action  - Evaluate amount of meals eaten  - Assist patient with eating if necessary   - Allow adequate time for meals  - Recommend/ encourage appropriate diets, oral nutritional supplements, and vitamin/mineral supplements  - Order, calculate, and assess calorie counts as needed  - Recommend, monitor, and adjust tube feedings and TPN/PPN based on assessed needs  - Assess need for intravenous fluids  - Provide specific nutrition/hydration education as appropriate  - Include patient/family/caregiver in decisions related to nutrition  1/3/2022 1210 by David Pratt RN  Outcome: Adequate for Discharge  1/3/2022 1208 by David Pratt RN  Outcome: Progressing     Problem: METABOLIC, FLUID AND ELECTROLYTES - ADULT  Goal: Electrolytes maintained within normal limits  Description: INTERVENTIONS:  - Monitor labs and assess patient for signs and symptoms of electrolyte imbalances  - Administer electrolyte replacement as ordered  - Monitor response to electrolyte replacements, including repeat lab results as appropriate  - Instruct patient on fluid and nutrition as appropriate  1/3/2022 1210 by Jacob Lucas RN  Outcome: Adequate for Discharge  1/3/2022 1208 by Jacob Lucas RN  Outcome: Progressing  Goal: Fluid balance maintained  Description: INTERVENTIONS:  - Monitor labs   - Monitor I/O and WT  - Instruct patient on fluid and nutrition as appropriate  - Assess for signs & symptoms of volume excess or deficit  1/3/2022 1210 by Jacob Lucas RN  Outcome: Adequate for Discharge  1/3/2022 1208 by Jacob Lucas RN  Outcome: Progressing  Goal: Glucose maintained within target range  Description: INTERVENTIONS:  - Monitor Blood Glucose as ordered  - Assess for signs and symptoms of hyperglycemia and hypoglycemia  - Administer ordered medications to maintain glucose within target range  - Assess nutritional intake and initiate nutrition service referral as needed  1/3/2022 1210 by Jacob Lucas RN  Outcome: Adequate for Discharge  1/3/2022 1208 by Jacob Lucas RN  Outcome: Progressing

## 2022-01-03 NOTE — DISCHARGE INSTRUCTIONS
Enteritis   WHAT YOU NEED TO KNOW:   Enteritis is inflammation of the small intestine  It may be caused by eating foods or drinking liquids contaminated with a virus, bacteria, or parasites  It may also be caused by certain medicines, damage from radiation, and medical conditions such as Crohn disease  DISCHARGE INSTRUCTIONS:   Seek care immediately if:   · You cannot stop vomiting  · You have not urinated for 12 hours  Contact your healthcare provider if:   · You have a fever over 101 5  · You have blood or mucus in your bowel movements  · You continue to vomit or have diarrhea for more than 3 days, even after treatment  · You have a dry mouth and eyes, you are urinating less than usual, and you feel dizzy when you stand up  · Your mouth or eyes are dry  You are not urinating as much or as often  · You are losing weight without trying  · You have questions or concerns about your condition or care  Medicines:   · Medicines  may be given to fight an infection caused by bacteria or a parasite  You may also need medicines to slow or stop your diarrhea or vomiting  Do not take these medicines unless your healthcare provider say it is okay  Other medicines may be needed to treat medical conditions that are causing enteritis  · Take your medicine as directed  Contact your healthcare provider if you think your medicine is not helping or if you have side effects  Tell him of her if you are allergic to any medicine  Keep a list of the medicines, vitamins, and herbs you take  Include the amounts, and when and why you take them  Bring the list or the pill bottles to follow-up visits  Carry your medicine list with you in case of an emergency  Manage enteritis:   · Eat foods that help to decrease symptoms  Limit or avoid foods and liquids that are high in sugar, fat, and fiber to help relieve diarrhea  It may be helpful to avoid lactose   Lactose is a type of sugar that is found in milk products  You may be able to tolerate soups, broths, well-cooked vegetables, canned fruit, and baked or broiled meats  Ask your dietitian or healthcare provider if you should follow a special diet  You may need to avoid other foods if you have certain medical conditions such as celiac disease  · Drink liquids as directed  Ask how much liquid to drink each day and which liquids are best for you  It is important to prevent or treat dehydration  Even if you have been vomiting, suck on ice chips or take small sips of clear liquids often  Slowly increase the amount of clear liquids you drink  If you become dehydrated, you may need IV liquids  · Drink an oral rehydration solution (ORS) as directed  An ORS contains water, salts, and sugar that are needed to replace lost body fluids  Ask what kind of ORS to use, how much to drink, and where to get it  Prevent enteritis:  Enteritis that is caused by bacteria, parasites, or viruses can be prevented  The following may help to prevent this type of enteritis:  · Wash your hands often  Use soap and water  Wash your hands after you use the bathroom, change a child's diapers, or sneeze  Wash your hands before you prepare or eat food  · Clean surfaces and do laundry often  Wash your clothes and towels separately from the rest of the laundry  Clean surfaces in your home with antibacterial  or bleach  · Clean food thoroughly and cook safely  Wash raw vegetables before you cook  Cook meat, fish, and eggs fully  Do not use the same dishes for raw meat as you do for other foods  Refrigerate any leftover food immediately  · Be aware when you camp or travel  Drink only clean water  Do not drink from rivers or lakes unless you purify or boil the water first  When you travel, drink bottled water and do not add ice  Do not eat fruit that has not been peeled  Do not eat raw fish or meat that is not fully cooked      Follow up with your doctor as directed:  Write down your questions so you remember to ask them during your visits  © Copyright [employer] 2021 Information is for End User's use only and may not be sold, redistributed or otherwise used for commercial purposes  All illustrations and images included in CareNotes® are the copyrighted property of A [employer]  or Omar [employer]  The above information is an  only  It is not intended as medical advice for individual conditions or treatments  Talk to your doctor, nurse or pharmacist before following any medical regimen to see if it is safe and effective for you

## 2022-01-03 NOTE — PLAN OF CARE
Problem: MOBILITY - ADULT  Goal: Maintain or return to baseline ADL function  Description: INTERVENTIONS:  -  Assess patient's ability to carry out ADLs; assess patient's baseline for ADL function and identify physical deficits which impact ability to perform ADLs (bathing, care of mouth/teeth, toileting, grooming, dressing, etc )  - Assess/evaluate cause of self-care deficits   - Assess range of motion  - Assess patient's mobility; develop plan if impaired  - Assess patient's need for assistive devices and provide as appropriate  - Encourage maximum independence but intervene and supervise when necessary  - Involve family in performance of ADLs  - Assess for home care needs following discharge   - Consider OT consult to assist with ADL evaluation and planning for discharge  - Provide patient education as appropriate  Outcome: Progressing  Goal: Maintains/Returns to pre admission functional level  Description: INTERVENTIONS:  - Perform BMAT or MOVE assessment daily    - Set and communicate daily mobility goal to care team and patient/family/caregiver     - Collaborate with rehabilitation services on mobility goals if consulted    - Out of bed for toileting  - Record patient progress and toleration of activity level   Outcome: Progressing     Problem: Potential for Falls  Goal: Patient will remain free of falls  Description: INTERVENTIONS:  - Educate patient/family on patient safety including physical limitations  - Instruct patient to call for assistance with activity   - Consult OT/PT to assist with strengthening/mobility   - Keep Call bell within reach  - Keep bed low and locked with side rails adjusted as appropriate  - Keep care items and personal belongings within reach  - Initiate and maintain comfort rounds  - Make Fall Risk Sign visible to staff    - Apply yellow socks and bracelet for high fall risk patients  - Consider moving patient to room near nurses station  Outcome: Progressing     Problem: Nutrition/Hydration-ADULT  Goal: Nutrient/Hydration intake appropriate for improving, restoring or maintaining nutritional needs  Description: Monitor and assess patient's nutrition/hydration status for malnutrition  Collaborate with interdisciplinary team and initiate plan and interventions as ordered  Monitor patient's weight and dietary intake as ordered or per policy  Utilize nutrition screening tool and intervene as necessary  Determine patient's food preferences and provide high-protein, high-caloric foods as appropriate       INTERVENTIONS:  - Monitor oral intake, urinary output, labs, and treatment plans  - Assess nutrition and hydration status and recommend course of action  - Evaluate amount of meals eaten  - Assist patient with eating if necessary   - Allow adequate time for meals  - Recommend/ encourage appropriate diets, oral nutritional supplements, and vitamin/mineral supplements  - Order, calculate, and assess calorie counts as needed  - Recommend, monitor, and adjust tube feedings and TPN/PPN based on assessed needs  - Assess need for intravenous fluids  - Provide specific nutrition/hydration education as appropriate  - Include patient/family/caregiver in decisions related to nutrition  Outcome: Progressing     Problem: METABOLIC, FLUID AND ELECTROLYTES - ADULT  Goal: Electrolytes maintained within normal limits  Description: INTERVENTIONS:  - Monitor labs and assess patient for signs and symptoms of electrolyte imbalances  - Administer electrolyte replacement as ordered  - Monitor response to electrolyte replacements, including repeat lab results as appropriate  - Instruct patient on fluid and nutrition as appropriate  Outcome: Progressing  Goal: Fluid balance maintained  Description: INTERVENTIONS:  - Monitor labs   - Monitor I/O and WT  - Instruct patient on fluid and nutrition as appropriate  - Assess for signs & symptoms of volume excess or deficit  Outcome: Progressing  Goal: Glucose maintained within target range  Description: INTERVENTIONS:  - Monitor Blood Glucose as ordered  - Assess for signs and symptoms of hyperglycemia and hypoglycemia  - Administer ordered medications to maintain glucose within target range  - Assess nutritional intake and initiate nutrition service referral as needed  Outcome: Progressing

## 2022-01-03 NOTE — DISCHARGE SUMMARY
[facility]  Discharge- Marianne Halifax 1960, 64 y o  male MRN: [id number]  Unit/Bed#: W -72 Encounter: [id number]  Primary Care Provider: Olayinka Romero DO   Date and time admitted to hospital: 1/2/2022 12:00 AM    * Enteritis  Assessment & Plan  · Presented with generalized abdominal pain, nausea/ vomiting and diarrhea x1 day prior to presentation  Was around other individuals with similar symptoms recently including small children  · CT A/P: "Mildly fluid distended loops of small bowel without discrete transition, nonspecific  Correlate clinically for nonspecific enteritis and/or ileus in the appropriate setting"  · COVID negative  C-diff negative  · Resolved with supportive cares  Likely viral  Advance diet as tolerated  Leukocytosis  Assessment & Plan  · Mild, POA, with WBC 11 11  Resolved, monitor leukopenia as outpatient  ESRD on dialysis [facility])  Assessment & Plan  Lab Results   Component Value Date    EGFR 6 01/03/2022    EGFR 7 01/01/2022    EGFR 7 09/08/2021    CREATININE 8 22 (H) 01/03/2022    CREATININE 6 78 (H) 01/01/2022    CREATININE 7 39 (H) 09/08/2021     · On HD MWF  · Received HD in house on 1/3/21  Next HD session at outpatient center on Wednesday  History of stroke  Assessment & Plan  · History of left MCA CVA in 2018       Diabetic polyneuropathy associated with type 2 diabetes mellitus [facility])  Assessment & Plan  Lab Results   Component Value Date    HGBA1C 6 4 12/09/2021       Recent Labs     01/02/22  1715 01/02/22  2226 01/03/22  0628 01/03/22  1140   POCGLU 127 110 124 132       Blood Sugar Average: Last 72 hrs:  (P) 138 [id number]   Continue home regimen  Diabetic diet  Outpatient follow-up      Medical Problems             Resolved Problems  Date Reviewed: 1/3/2022    None              Discharging Physician / Practitioner: Moi Clark MD  PCP: Olayinka Romero DO  Admission Date:   Admission Orders (From admission, onward)     Ordered 01/02/22 1 [facility] Марина  Once                      Discharge Date: 01/03/22    Consultations During Hospital Stay:  · Nephrology    Procedures Performed:   · None    Significant Findings / Test Results:   · None    Incidental Findings:   · None    Test Results Pending at Discharge (will require follow up): · None     Outpatient Tests Requested:  · None    Complications:  None    Reason for Admission: Nausea/vomiting/diarrhea    Hospital Course:   Darinel Simon is a 64 y o  male patient who originally presented to the hospital on 1/2/2022 due to x1 day of intractable nausea, vomiting, diarrhea  Patient had recent sick contacts including children with similar symptoms who had a viral gastroenteritis  CT abd showed non-specific findings consistent with enteritis  Patient was given x1 dose of cipro and flagyl in the ED  Supportive cares were instituted and diet was advanced as tolerated  Patient had quick resolution of his symptoms within 24 hours and deemed medically stable for DC after HD  Cdiff and COVID testing was performed this admission and was negative  Please see above list of diagnoses and related plan for additional information  Condition at Discharge: good    Discharge Day Visit / Exam:   Subjective:  No acute events overnight  Patient denies any abdominal pain  Nausea/vomiting/diarrhea have resolved  Did not have breakfast this AM though tolerated meal yesterday evening  Afebrile  Currently receiving HD  Vitals: Blood Pressure: 109/88 (01/03/22 1200)  Pulse: 103 (01/03/22 1200)  Temperature: 97 7 °F (36 5 °C) (01/03/22 1200)  Temp Source: Oral (01/03/22 1200)  Respirations: 18 (01/03/22 1200)  Height: 5' 9" (175 3 cm) (01/03/22 0708)  Weight - Scale: 104 kg (230 lb) (01/03/22 0708)  SpO2: 97 % (01/03/22 0627)  Exam:   Physical Exam  Constitutional:       General: He is not in acute distress  Appearance: He is obese  He is not ill-appearing or toxic-appearing  Cardiovascular:      Rate and Rhythm: Normal rate and regular rhythm  Pulmonary:      Effort: No respiratory distress  Breath sounds: No wheezing or rhonchi  Abdominal:      General: There is no distension  Tenderness: There is no abdominal tenderness  Musculoskeletal:         General: No swelling  Skin:     General: Skin is warm and dry  Neurological:      Mental Status: Mental status is at baseline  Discussion with Family: Updated  (wife) at bedside  Discharge instructions/Information to patient and family:   See after visit summary for information provided to patient and family  Provisions for Follow-Up Care:  See after visit summary for information related to follow-up care and any pertinent home health orders  Disposition:   Home    Planned Readmission: None     Discharge Statement:  I spent 35 minutes discharging the patient  This time was spent on the day of discharge  I had direct contact with the patient on the day of discharge  Greater than 50% of the total time was spent examining patient, answering all patient questions, arranging and discussing plan of care with patient as well as directly providing post-discharge instructions  Additional time then spent on discharge activities  Discharge Medications:  See after visit summary for reconciled discharge medications provided to patient and/or family        **Please Note: This note may have been constructed using a voice recognition system**

## 2022-01-03 NOTE — ASSESSMENT & PLAN NOTE
· Presented with generalized abdominal pain, nausea/ vomiting and diarrhea x1 day prior to presentation  Was around other individuals with similar symptoms recently including small children  · CT A/P: "Mildly fluid distended loops of small bowel without discrete transition, nonspecific  Correlate clinically for nonspecific enteritis and/or ileus in the appropriate setting"  · COVID negative  C-diff negative  · Resolved with supportive cares  Likely viral  Advance diet as tolerated

## 2022-01-03 NOTE — ASSESSMENT & PLAN NOTE
Lab Results   Component Value Date    EGFR 6 01/03/2022    EGFR 7 01/01/2022    EGFR 7 09/08/2021    CREATININE 8 22 (H) 01/03/2022    CREATININE 6 78 (H) 01/01/2022    CREATININE 7 39 (H) 09/08/2021     · On HD MWF  · Received HD in house on 1/3/21  Next HD session at outpatient center on Wednesday

## 2022-01-03 NOTE — UTILIZATION REVIEW
Initial Clinical Review    Admission: Date/Time/Statement:   Admission Orders (From admission, onward)     Ordered        01/02/22 0343  INPATIENT ADMISSION  Once                      Orders Placed This Encounter   Procedures    INPATIENT ADMISSION     Standing Status:   Standing     Number of Occurrences:   1     Order Specific Question:   Level of Care     Answer:   Med Surg [16]     Order Specific Question:   Estimated length of stay     Answer:   More than 2 Midnights     Order Specific Question:   Certification     Answer:   I certify that inpatient services are medically necessary for this patient for a duration of greater than two midnights  See H&P and MD Progress Notes for additional information about the patient's course of treatment  ED Arrival Information     Expected Arrival Acuity    - 1/1/2022 21:02 Urgent         Means of arrival Escorted by Service Admission type    Walk-In Self Hospitalist Urgent         Arrival complaint    VOMITING/DIARRHEA        Chief Complaint   Patient presents with    Vomiting     C/o diarrhea and vomiting starting one hour ago  Initial Presentation: 65 yo M with ESRD on HD M/W/F, DM2, CVA, who presents to ED from home with multiple episodes of diarrhea, nausea/vomting, feels weak for past day with decreased po intake due to N/V  Patient denies any recent sick contacts though per patient's son they have been around some contacts with similar symptoms recently  No hx of c-diff or recent abx use  On exam, pt has abdominal tenderness, abdominal distention, dry mucous membranes  , LUE dialysis fistula  CT of his abdomen consistent with enteritis  WBC's =11 1, creat 6 78, pro BNP= 1,859 ECG- NSRHR 96  RBBB  LAFB  Pt given Tylenol, Iv antiemetic, IVF bolus, IV abx in ED  Pt admitted as Inpatient with enteritis, leukocytosis, ESRD on HD  Plan - Monitor off abx, Monitor outoput, prn Zofran, CL diet as tolerated, CBC 1/2, nephrology consult for HD       Date:1/3/21 Day 2:   nephrology-Pt receiving HD today, creat 8 22 this am Patient had dialysis earlier today, only 2 5 hours due to staffing shortage  Dry weight 103 kg and ended treatment at 103 5 kg today  BP labile and did drop with dialysis  Continue carvedilol (holding pre HD)  C Diff neg  He currently is feeling much better and denies any recent nausea, vomiting or diarrhea  He has a good appetite and was eating and drinking today  He is hoping to go home       WBC =3 93 today    ED Triage Vitals [01/01/22 2155]   Temperature Pulse Respirations Blood Pressure SpO2   99 4 °F (37 4 °C) 97 18 138/76 98 %      Temp Source Heart Rate Source Patient Position - Orthostatic VS BP Location FiO2 (%)   Oral Monitor Sitting Right arm --      Pain Score       8          Wt Readings from Last 1 Encounters:   01/03/22 104 kg (230 lb)     Additional Vital Signs:   Date/Time Temp Pulse Resp BP MAP (mmHg) SpO2 O2 Device Patient Position - Orthostatic VS   01/03/22 1100 -- 91 18 162/75 -- -- -- --   01/03/22 1030 -- 87 18 137/75 -- -- -- Lying   01/03/22 1000 -- 88 18 154/93 -- -- -- Lying   01/03/22 0930 -- 79 18 145/78 -- -- -- Lying   01/03/22 0915 -- 87 18 137/102 Abnormal  -- -- -- Lying   01/03/22 0900 -- 83 18 191/125 Abnormal  -- -- -- Lying   01/03/22 0627 -- 84 18 139/64 -- 97 % None (Room air) Lying   01/02/22 2148 -- 78 18 148/67 96 98 % None (Room air) --   01/02/22 1700 -- 78 18 134/63 -- 98 % None (Room air) Lying   01/02/22 1659 -- 79 18 134/63 -- 97 % None (Room air) Sitting   01/02/22 1505 -- 86 18 168/90 -- 99 % None (Room air) Lying   01/02/22 0600 -- 86 18 162/72 103 94 % None (Room air) Lying   01/02/22 0227 -- 94 18 140/72 -- 96 % None (Room air) Lying   01/02/22 0213 -- 92 18 181/86 Abnormal  -- 100 % None       Pertinent Labs/Diagnostic Test Results:   1/2  CTA chest PE study, CT abdomen-Limited evaluation for distal pulmonary arterial emboli secondary to motion      No evidence of central/obstructing or proximal pulmonary embolus    No consolidation or effusion    Mildly fluid distended loops of small bowel without discrete transition, nonspecific  Correlate clinically for nonspecific enteritis and/or ileus in the appropriate setting    Stable hepatosplenomegaly    ECG-Normal sinus rhythm  Right bundle branch block  Left anterior fascicular block        Results from last 7 days   Lab Units 01/02/22  0043   SARS-COV-2  Negative     Results from last 7 days   Lab Units 01/03/22  0449 01/01/22  2158   WBC Thousand/uL 3 93* 11 11*   HEMOGLOBIN g/dL 11 3* 12 8   HEMATOCRIT % 35 0* 39 1   PLATELETS Thousands/uL 143* 153   NEUTROS ABS Thousands/µL 2 08 9 51*         Results from last 7 days   Lab Units 01/03/22  0449 01/02/22  0034 01/01/22  2158   SODIUM mmol/L 137  --  135*   POTASSIUM mmol/L 4 1  --  4 6   CHLORIDE mmol/L 97*  --  94*   CO2 mmol/L 27  --  30   ANION GAP mmol/L 13  --  11   BUN mg/dL 68*  --  53*   CREATININE mg/dL 8 22*  --  6 78*   EGFR ml/min/1 73sq m 6  --  7   CALCIUM mg/dL 8 5  --  9 1   MAGNESIUM mg/dL  --  1 9  --      Results from last 7 days   Lab Units 01/01/22  2158   AST U/L 13   ALT U/L 28   ALK PHOS U/L 99   TOTAL PROTEIN g/dL 8 0   ALBUMIN g/dL 4 0   TOTAL BILIRUBIN mg/dL 0 69     Results from last 7 days   Lab Units 01/03/22  0628 01/02/22  2226 01/02/22  1715 01/02/22  1617 01/02/22  1313 01/02/22  0910   POC GLUCOSE mg/dl 124 110 127 141* 177* 161*     Results from last 7 days   Lab Units 01/03/22  0449 01/01/22  2158   GLUCOSE RANDOM mg/dL 124 186*               Results from last 7 days   Lab Units 01/02/22  0502 01/02/22  0255 01/02/22  0040   HS TNI 0HR ng/L  --   --  29   HS TNI 2HR ng/L  --  27  --    HSTNI D2 ng/L  --  -2  --    HS TNI 4HR ng/L 28  --   --    HSTNI D4 ng/L -1  --   --                      Results from last 7 days   Lab Units 01/02/22  0040   LACTIC ACID mmol/L 1 2             Results from last 7 days   Lab Units 01/02/22  0034   NT-PRO BNP pg/mL 1,859* Results from last 7 days   Lab Units 01/01/22  2158   LIPASE u/L 187                 Results from last 7 days   Lab Units 01/02/22  2232   CLARITY UA  Clear   COLOR UA  Yellow   SPEC GRAV UA  1 015   PH UA  8 0   GLUCOSE UA mg/dl 250 (1/4%)*   KETONES UA mg/dl Negative   BLOOD UA  Small*   PROTEIN UA mg/dl 100 (2+)*   NITRITE UA  Negative   BILIRUBIN UA  Negative   UROBILINOGEN UA E U /dl 0 2   LEUKOCYTES UA  Negative   WBC UA /hpf None Seen   RBC UA /hpf 0-1   BACTERIA UA /hpf None Seen   EPITHELIAL CELLS WET PREP /hpf None Seen   MUCUS THREADS  Occasional*     Results from last 7 days   Lab Units 01/02/22  0043   INFLUENZA A PCR  Negative   INFLUENZA B PCR  Negative   RSV PCR  Negative               Results from last 7 days   Lab Units 01/02/22  0043 01/02/22  0040   BLOOD CULTURE  No Growth at 24 hrs  No Growth at 24 hrs                 ED Treatment:   Medication Administration from 01/01/2022 2102 to 01/03/2022 0846       Date/Time Order Dose Route Action Action by Comments     01/02/2022 0153 acetaminophen (TYLENOL) tablet 650 mg 650 mg Oral Given Jens Britton RN      01/02/2022 0147 iodixanol (VISIPAQUE) 320 MG/ML injection 100 mL 100 mL Intravenous Given [place]      01/02/2022 0255 ondansetron (ZOFRAN) injection 4 mg 4 mg Intravenous Given Jens Britton RN      01/02/2022 0335 sodium chloride 0 9 % bolus 250 mL 250 mL [facility] [place] [address]      01/02/2022 0239 ciprofloxacin (CIPRO) IVPB (premix in 5% dextrose) 400 mg 200 mL 400 mg [facility] [place] RN      01/03/2022 9580 heparin (porcine) subcutaneous injection 5,000 Units 5,000 Units Subcutaneous Given Keya Tabares RN      01/02/2022 2229 heparin (porcine) subcutaneous injection 5,000 Units 5,000 Units Subcutaneous Given Keya Tabares RN      01/02/2022 1325 heparin (porcine) subcutaneous injection 5,000 Units 5,000 Units Subcutaneous Given Ai Blackwell RN      01/02/2022 1325 insulin lispro (HumaLOG) 100 units/mL subcutaneous injection 1-6 Units 1 Units Subcutaneous Given Blade Vitale RN      01/02/2022 1657 atorvastatin (LIPITOR) tablet 40 mg 40 mg Oral Given Rolly Allan RN      01/02/2022 1657 calcium acetate (PHOSLO) capsule 1,334 mg 1,334 mg Oral Given Rolly Allan RN      01/02/2022 1314 calcium acetate (PHOSLO) capsule 1,334 mg 1,334 mg Oral Given Blade Vitale RN      01/02/2022 1314 aspirin (ECOTRIN LOW STRENGTH) EC tablet 81 mg 81 mg Oral Given Blade Vitale RN      01/02/2022 1314 carvedilol (COREG) tablet 6 25 mg 6 25 mg Oral Given Blade Vitale RN      01/02/2022 1325 insulin glargine (LANTUS) subcutaneous injection 7 Units 0 07 mL 7 Units Subcutaneous Given Blade Vitale RN         Past Medical History:   Diagnosis Date    Chronic kidney disease     Diabetes mellitus ([place] [address] )     GERD (gastroesophageal reflux disease)     Hypercholesteremia     Hyperlipidemia     Hypertension     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    Neuropathy     Obesity     Osteomyelitis ([place] [address] )     last assessed 11/4/16    PVC's (premature ventricular contractions)     sees cardiology Dr Niraj camargo    Stroke [facility])     last weeof July 2018 [address]     Present on Admission:   Diabetic polyneuropathy associated with type 2 diabetes mellitus ([place] [address] )      Admitting Diagnosis: Diarrhea [R19 7]  Gastroenteritis [K52 9]  Vomiting [R11 10]  Weakness [R53 1]  ESRD on dialysis ([place] [address] ) [N18 6, Z99 2]  Age/Sex: 64 y o  male  Admission Orders:  Scheduled Medications:  aspirin, 81 mg, Oral, Daily  atorvastatin, 40 mg, Oral, Daily With Dinner  calcium acetate, 1,334 mg, Oral, TID With Meals  carvedilol, 6 25 mg, Oral, Once per day on Sun Tue Thu Sat  carvedilol, 6 25 mg, Oral, Once per day on Mon Wed Fri  escitalopram, 5 mg, Oral, HS  heparin (porcine), 5,000 Units, Subcutaneous, Q8H [facility]  insulin glargine, 7 Units, Subcutaneous, Daily  insulin lispro, 1-5 Units, Subcutaneous, HS  insulin lispro, 1-6 Units, Subcutaneous, TID AC      Continuous IV Infusions:     PRN Meds:  acetaminophen, 650 mg, Oral, Q6H PRN  doxercalciferol, 4 mcg, Intravenous, After Dialysis x1 1/3  loperamide, 4 mg, Oral, 4x Daily PRN      Daily wt  I/O  SCD's  CL diet 1/2  GI lo fiber lo residue diet as of 1/3    IP CONSULT TO NEPHROLOGY    Network Utilization Review Department  ATTENTION: Please call with any questions or concerns to [phone] and carefully listen to the prompts so that you are directed to the right person  All voicemails are confidential   Ty Limb all requests for admission clinical reviews, approved or denied determinations and any other requests to dedicated fax number below belonging to the campus where the patient is receiving treatment   List of dedicated fax numbers for the Facilities:  [address] DENIALS (Administrative/Medical Necessity) [phone]   [address] (Maternity/NICU/Pediatrics) [phone]   [address] [place] [address]  [address] [place] [address] [id number] [place] [address] [address] Marycarmen [address] [facility] [phone]

## 2022-01-03 NOTE — CONSULTS
Consultation - Nephrology   Rolando Bowers 64 y o  male MRN: [id number]  Unit/Bed#: W -00 Encounter: [id number]    ASSESSMENT/PLAN:   1  End-stage renal disease:  Hemodialysis Monday, Wednesday and Friday at [facility]   · Patient had dialysis earlier today, only 2 5 hours due to staffing shortage  · Dry weight 103 kg and ended treatment at 103 5 kg today  2  Nausea, vomiting and diarrhea:  C diff negative  3  Anemia of CKD:  Hemoglobin at goal 11 3 today  No need for BAYLEE today  4  Mineral bone disease of CKD: continue phoslo with meals and hectorol with HD    5  Hypertension: BP labile and did drop with dialysis  Continue carvedilol (holding pre HD)    6  Access: L AVF with good bruit and thrill     Patient being discharged today  Okay from Nephrology standpoint  He should follow up at his dialysis clinic on Wednesday for his next treatment  HISTORY OF PRESENT ILLNESS:  Requesting Physician: Tricia Hayes MD  Reason for Consult:  End-stage renal disease on hemodialysis    Rolando Bowers is a 64y o  year old male who was admitted to [address] [place] [address] with nausea, vomiting and diarrhea  He has end-stage renal disease on hemodialysis so Nephrology was consulted to continue his dialysis  On admission his CT showed enteritis  His C diff has been negative  He currently is feeling much better and denies any recent nausea, vomiting or diarrhea  He has a good appetite and was eating and drinking today  He is hoping to go home  He had dialysis earlier today and tolerated it well  No current chest pain or shortness of breath  No edema        PAST MEDICAL HISTORY:  Past Medical History:   Diagnosis Date    Chronic kidney disease     Diabetes mellitus ([facility] )     GERD (gastroesophageal reflux disease)     Hypercholesteremia     Hyperlipidemia     Hypertension     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    Neuropathy     Obesity     [facility]     last assessed 11/4/16    PVC's (premature ventricular contractions)     sees cardiology Dr Karely camargo    [facility])     [facility]       PAST SURGICAL HISTORY:  Past Surgical History:   Procedure Laterality Date    ABDOMINAL SURGERY      CHOLECYSTECTOMY      Percutaneous    COLONOSCOPY      CYSTOSCOPY      OTHER SURGICAL HISTORY      "stimulator to control bowel movements"    SC ESOPHAGOGASTRODUODENOSCOPY TRANSORAL DIAGNOSTIC N/A 9/27/2016    Procedure: ESOPHAGOGASTRODUODENOSCOPY (EGD); Surgeon: Brenda Ortiz MD;  [facility];   Service: Gastroenterology    SC LAP,CHOLECYSTECTOMY N/A 2/29/2016    Procedure: LAPAROSCOPIC CHOLECYSTECTOMY ;  Surgeon: Billie Hernandez DO;  [facility];  Service: General    ROTATOR CUFF REPAIR Right     TOE AMPUTATION Right 10/28/2016    Procedure: 3RD TOE AMPUTATION ;  Surgeon: Lina Mulligan DPM;  Location[facility];  Service:        ALLERGIES:  No Known Allergies    SOCIAL HISTORY:  Social History     Substance and Sexual Activity   Alcohol Use Not Currently     Social History     Substance and Sexual Activity   Drug Use No     Social History     Tobacco Use   Smoking Status Never Smoker   Smokeless Tobacco Never Used       FAMILY HISTORY:  Family History   Problem Relation Age of Onset    Leukemia Mother     Liver disease Mother     Lung cancer Mother         heavy smoker - 3 ppd    Heart disease Father     Liver disease Father     Multiple myeloma Sister     Breast cancer Sister     Urolithiasis Family     Alcohol abuse Neg Hx     Depression Neg Hx     Drug abuse Neg Hx     Substance Abuse Neg Hx     Mental illness Neg Hx        MEDICATIONS:  Scheduled Meds:  Current Facility-Administered Medications   Medication Dose Route Frequency Provider Last Rate    acetaminophen  650 mg Oral Q6H PRN Glorious Arbour, PA-C      aspirin  81 mg Oral Daily Glorious Arbour, PA-C      atorvastatin  40 mg Oral Daily With [employer] Carrie Silverman PA-C      calcium acetate  1,334 mg Oral TID With Meals Caity Aleman PA-C      carvedilol  6 25 mg Oral Once per day on Sun Tue Thu Sat Caity Aleman PA-C      carvedilol  6 25 mg Oral Once per day on Mon Wed Fri Caity Aleman PA-C      doxercalciferol  4 mcg Intravenous After Dialysis Donato Amin MD      escitalopram  5 mg Oral HS Caity Aleman PA-C      heparin (porcine)  5,000 Units Subcutaneous [facility] Caity Aleman [place]      insulin glargine  7 Units Subcutaneous Daily Caity Aleman PA-C      insulin lispro  1-5 Units Subcutaneous HS Caity Aleman PA-C      insulin lispro  1-6 Units Subcutaneous TID AC Priscila Bourne PA-C      loperamide  4 mg Oral 4x Daily PRN Cherelle Victor MD         PRN Meds:   acetaminophen    doxercalciferol    loperamide     REVIEW OF SYSTEMS:  A complete review of systems was done  Pertinent positives and negatives noted in the HPI but otherwise the review of systems is negative      PHYSICAL EXAM:  Current Weight: Weight - Scale: 104 kg (230 lb)  First Weight: Weight - Scale: 104 kg (230 lb)  Vitals:    01/03/22 1200   BP: 109/88   Pulse: 103   Resp: 18   Temp: 97 7 °F (36 5 °C)   SpO2:        Intake/Output Summary (Last 24 hours) at 1/3/2022 1304  Last data filed at 1/3/2022 1200  Gross per 24 hour   Intake 1000 ml   Output 900 ml   Net 100 ml     General:  No acute distress  Skin:  No rash  Eyes:  Sclerae anicteric  ENT:  Moist mucous membranes  Neck:  Trachea midline with no JVD  Chest:  Clear to auscultation bilaterally  CVS:  Regular rate and rhythm  Abdomen:  Soft, nontender, nondistended  Extremities:  No edema, LUE AVF with good bruit and thrill   Neuro:  Awake and alert  Psych:  Appropriate affect      Lab Results:   Results from last 7 days   Lab Units 01/03/22  0449 01/02/22  0034 01/01/22  2158   WBC Thousand/uL 3 93*  --  11 11*   HEMOGLOBIN g/dL 11 3*  --  12 8   HEMATOCRIT % 35 0*  --  39 1   PLATELETS Thousands/uL 143*  --  153 SODIUM mmol/L 137  --  135*   POTASSIUM mmol/L 4 1  --  4 6   CHLORIDE mmol/L 97*  --  94*   CO2 mmol/L 27  --  30   BUN mg/dL 68*  --  53*   CREATININE mg/dL 8 22*  --  6 78*   CALCIUM mg/dL 8 5  --  9 1   MAGNESIUM mg/dL  --  1 9  --        Radiology Results:   PE Study with CT abdomen & pelvis with contrast   Final Result by Michael Lopez MD (01/02 8307)      Limited evaluation for distal pulmonary arterial emboli secondary to motion  No evidence of central/obstructing or proximal pulmonary embolus  No consolidation or effusion  Mildly fluid distended loops of small bowel without discrete transition, nonspecific  Correlate clinically for nonspecific enteritis and/or ileus in the appropriate setting  Stable hepatosplenomegaly           Workstation performed: [id number]

## 2022-01-03 NOTE — ASSESSMENT & PLAN NOTE
Lab Results   Component Value Date    HGBA1C 6 4 12/09/2021       Recent Labs     01/02/22  1715 01/02/22  2226 01/03/22  0628 01/03/22  1140   POCGLU 127 110 124 132       Blood Sugar Average: Last 72 hrs:  (P) 021 [id number]   Continue home regimen  Diabetic diet  Outpatient follow-up

## 2022-01-03 NOTE — DISCHARGE INSTR - AVS FIRST PAGE
Dear Dennis Singh,     It was our pleasure to care for you here at [facility]  It is our hope that we were always able to exceed the expected standards for your care during your stay  You were hospitalized due to viral gastroenteritis  You were cared for on the 4W floor under the service of Vinnie Brooks MD with the [facility] who covers for your primary care physician (PCP), Naomi Noe, DO, while you were hospitalized  If you have any questions or concerns related to this hospitalization, you may contact us at [phone]  For follow up as well as medication refills, we recommend that you follow up with your primary care physician  A registered nurse will reach out to you by phone within a few days after your discharge to answer any additional questions that you may have after going home  However, at this time we provide for you here, the most important instructions / recommendations at discharge:     Notable Medication Adjustments -   None  Testing Required after Discharge -   None  Important follow up information -   Follow-up with PCP in 1 week  Other Instructions -   Lo-fiber/lo-residue diet, advance as tolerated  Please review this entire after visit summary as additional general instructions including medication list, appointments, activity, diet, any pertinent wound care, and other additional recommendations from your care team that may be provided for you        Sincerely,     Vinnie Brooks MD

## 2022-01-03 NOTE — PLAN OF CARE
Goal 1 5kg  Problem: METABOLIC, FLUID AND ELECTROLYTES - ADULT  Goal: Electrolytes maintained within normal limits  Description: INTERVENTIONS:  - Monitor labs and assess patient for signs and symptoms of electrolyte imbalances  - Administer electrolyte replacement as ordered  - Monitor response to electrolyte replacements, including repeat lab results as appropriate  - Instruct patient on fluid and nutrition as appropriate  Outcome: Progressing  Goal: Fluid balance maintained  Description: INTERVENTIONS:  - Monitor labs   - Monitor I/O and WT  - Instruct patient on fluid and nutrition as appropriate  - Assess for signs & symptoms of volume excess or deficit  Outcome: Progressing

## 2022-01-04 ENCOUNTER — TRANSITIONAL CARE MANAGEMENT (OUTPATIENT)
Dept: INTERNAL MEDICINE CLINIC | Facility: CLINIC | Age: 62
End: 2022-01-04

## 2022-01-04 LAB
BACTERIA UR CULT: ABNORMAL
LEFT EYE DIABETIC RETINOPATHY: NORMAL
RIGHT EYE DIABETIC RETINOPATHY: NORMAL

## 2022-01-04 NOTE — UTILIZATION REVIEW
Inpatient Admission Authorization Request   NOTIFICATION OF INPATIENT ADMISSION/INPATIENT AUTHORIZATION REQUEST   SERVICING FACILITY:   [address] [facility] [address] [place] [address]  Tax ID: [id number]  NPI: [id number]  Place [facility]  [address]  Place of Service Code: 24     ATTENDING PROVIDER:  Attending Name and NPI#: Meghan Maki [[id number]]  Address: [address], [address] [place] [address]  Phone: [phone]     UTILIZATION REVIEW CONTACT:  Mell Barron, Utilization   Network Utilization Review Department  Phone: [phone]  Fax: [phone]  Email: [email or web address]     PHYSICIAN ADVISORY SERVICES:  FOR [id number] REVIEW - MEDICAL NECESSITY DENIAL  Phone: [phone]  Fax: [phone]  Email: [email or web address]     TYPE OF REQUEST:  Inpatient Status     ADMISSION INFORMATION:  ADMISSION DATE/TIME: 1/2/22  3:43 AM  PATIENT DIAGNOSIS CODE/DESCRIPTION:  Diarrhea [R19 7]  Gastroenteritis [K52 9]  Vomiting [R11 10]  Weakness [R53 1]  ESRD on dialysis ([facility] ) [N18 6, Z99 2]  DISCHARGE DATE/TIME: 1/3/2022  1:35 PM  DISCHARGE DISPOSITION (IF DISCHARGED): Home/Self Care     IMPORTANT INFORMATION:  Please contact the Mell Barron directly with any questions or concerns regarding this request  Department voicemails are confidential     Send requests for admission clinical reviews, concurrent reviews, approvals, and administrative denials due to lack of clinical to fax [phone]  O positive

## 2022-01-05 ENCOUNTER — TELEPHONE (OUTPATIENT)
Dept: ADMINISTRATIVE | Facility: OTHER | Age: 62
End: 2022-01-05

## 2022-01-05 NOTE — TELEPHONE ENCOUNTER
----- Message from Robert Dickson sent at 1/5/2022  9:50 AM EST -----  01/05/22 9:50 AM    Hello, our patient Santhosh Diaz has had Diabetic Eye Examcompleted/performed  Please assist in updating the patient chart by pulling the document from the Media Tab  The date of service is 2021       Thank you,  Robert Dickson  [facility] INTERNAL MED

## 2022-01-06 NOTE — TELEPHONE ENCOUNTER
Upon review of the In Basket request we were able to locate, review, and update the patient chart as requested for Diabetic Eye Exam     Any additional questions or concerns should be emailed to the Practice Liaisons via [email or web address] email, please do not reply via In Basket      Thank you  Jerry Kang MA

## 2022-01-07 ENCOUNTER — TELEPHONE (OUTPATIENT)
Dept: INTERNAL MEDICINE CLINIC | Facility: CLINIC | Age: 62
End: 2022-01-07

## 2022-01-07 DIAGNOSIS — R06.7 SNEEZING: Primary | ICD-10-CM

## 2022-01-07 DIAGNOSIS — Z20.822 EXPOSURE TO COVID-19 VIRUS: ICD-10-CM

## 2022-01-07 LAB
BACTERIA BLD CULT: NORMAL
BACTERIA BLD CULT: NORMAL

## 2022-01-07 PROCEDURE — U0003 INFECTIOUS AGENT DETECTION BY NUCLEIC ACID (DNA OR RNA); SEVERE ACUTE RESPIRATORY SYNDROME CORONAVIRUS 2 (SARS-COV-2) (CORONAVIRUS DISEASE [COVID-19]), AMPLIFIED PROBE TECHNIQUE, MAKING USE OF HIGH THROUGHPUT TECHNOLOGIES AS DESCRIBED BY CMS-2020-01-R: HCPCS | Performed by: INTERNAL MEDICINE

## 2022-01-07 NOTE — TELEPHONE ENCOUNTER
chantel called and said her daughter tested positive 2 days ago with symptoms that started Tuesday for her  Dinh Marcell is starting with symptoms of sneezing and would like testing  Message re his wife will come via her chart  They are at [facility] now -went on their own and need order      [phone]

## 2022-01-07 NOTE — TELEPHONE ENCOUNTER
He has an appt on Monday 1/10/22 with me    This will need to be made a video(TCM) visit    Thank you

## 2022-01-10 ENCOUNTER — TELEMEDICINE (OUTPATIENT)
Dept: INTERNAL MEDICINE CLINIC | Facility: CLINIC | Age: 62
End: 2022-01-10
Payer: MEDICARE

## 2022-01-10 ENCOUNTER — TELEPHONE (OUTPATIENT)
Dept: INTERNAL MEDICINE CLINIC | Facility: CLINIC | Age: 62
End: 2022-01-10

## 2022-01-10 DIAGNOSIS — Z09 HOSPITAL DISCHARGE FOLLOW-UP: Primary | ICD-10-CM

## 2022-01-10 DIAGNOSIS — U07.1 COVID-19 VIRUS INFECTION: ICD-10-CM

## 2022-01-10 DIAGNOSIS — N18.6 ESRD ON DIALYSIS (HCC): ICD-10-CM

## 2022-01-10 DIAGNOSIS — Z99.2 ESRD ON DIALYSIS (HCC): ICD-10-CM

## 2022-01-10 PROCEDURE — 99442 PR PHYS/QHP TELEPHONE EVALUATION 11-20 MIN: CPT | Performed by: INTERNAL MEDICINE

## 2022-01-10 NOTE — PROGRESS NOTES
Dialysis Monday   Continue with scheduled hemodialysis for this patient.  1/5/2022 Dialysis done today.  No other changes  1/6/2022 Continue with dialysis on tomorrow.  1/7/2022  Dialysis as scheduled today.  And dialysis on tomorrow.  1/10/2022 Dialysis as scheduled.   PATIENT:  Adriano Dominguez    1960    ASSESSMENT:     1  Diabetic polyneuropathy associated with type 2 diabetes mellitus ([facility] )     2  Contusion of lesser toe of right foot with damage to nail, initial encounter     3  Onychomycosis  Debridement   4  Unspecified open wound, right lower leg, initial encounter           PLAN:  Patient was counseled on the condition and diagnosis  Educated disease prevention and risks related to diabetes  Educated proper daily foot care and exam   Instructed proper skin care / protection and footwear  Instructed to identify any signs of infection and related foot problem  Discussed proper blood glucose control with diet and exercise  No treatment needed for right 2nd toe  Instructed him to monitor  Instructed local care on right shin and call the office in 2 weeks if it does not heal       All mycotic toenails were reduced and debrided in length, width, and girth using a nail nipper and dremel  The patient has high risk diabetic foot and will return in 9 weeks for diabetic foot exam and care  Subjective:          HPI  The patients presents for discoloration of right 2nd toe  He noticed it for a few weeks  No foot pain  No drainage or redness  He has diabetes with history of partial toe amputation  He also had a wound on right leg for about 2 weeks  He developed after a fall  BS is under control  He has numbness and paresthesia from neuropathy  The following portions of the patient's history were reviewed and updated as appropriate: allergies, current medications, past family history, past medical history, past social history, past surgical history and problem list   All pertinent labs and images were reviewed      Past Medical History  Past Medical History:   Diagnosis Date    Diabetes mellitus ([facility] )     Hypercholesteremia     Hyperlipidemia     Hypertension     Neuropathy     Obesity     [facility]omyelitis ([facility] )     last assessed 11/4/16    PVC's (premature ventricular contractions)     sees cardiology Dr Michelle camargo    Stroke [facility])     [facility] weeof July 2018 [facility]       Past Surgical History  Past Surgical History:   Procedure Laterality Date    ABDOMINAL SURGERY      CHOLECYSTECTOMY      Percutaneous    OTHER SURGICAL HISTORY      "stimulator to control bowel movements"    OR ESOPHAGOGASTRODUODENOSCOPY TRANSORAL DIAGNOSTIC N/A 9/27/2016    Procedure: ESOPHAGOGASTRODUODENOSCOPY (EGD); Surgeon: Garry Langley MD;  [facility]; Service: Gastroenterology    OR LAP,CHOLECYSTECTOMY N/A 2/29/2016    Procedure: LAPAROSCOPIC CHOLECYSTECTOMY ;  Surgeon: James Siddiqui DO;  [facility];  Service: General    ROTATOR CUFF REPAIR Right     TOE AMPUTATION Right 10/28/2016    Procedure: 3RD TOE AMPUTATION ;  Surgeon: Janet Bernal DPM;  [facility];  Service:         Allergies:  Patient has no known allergies  Medications:  Current Outpatient Medications   Medication Sig Dispense Refill    amLODIPine (NORVASC) 5 mg tablet Take 1 tablet (5 mg total) by mouth every 12 (twelve) hours 180 tablet 1    aspirin (ECOTRIN LOW STRENGTH) 81 mg EC tablet Take 81 mg by mouth daily Resume on 8/14      atorvastatin (LIPITOR) 80 mg tablet Take 1 tablet (80 mg total) by mouth daily with dinner 90 tablet 1    Blood Glucose Monitoring Suppl (ONE TOUCH ULTRA MINI) w/Device KIT by Does not apply route 3 (three) times a day 1 each 0    Blood Pressure Monitoring (BLOOD PRESSURE CUFF) MISC Use to check blood pressure before taking blood pressure medication and 1 hour after and follow instructions provided in discharge instructions based on the readings   1 each 0    carvedilol (COREG) 6 25 mg tablet Take 1 tablet (6 25 mg total) by mouth 2 (two) times a day with meals 180 tablet 1    Cholecalciferol (VITAMIN D3) 72636 units CAPS TAKE 1 TABLET BY MOUTH ONCE A WEEK 5 capsule 5    CVS VITAMIN B-12 1000 MCG tablet TAKE 1 TABLET BY MOUTH EVERY DAY 30 tablet 5    escitalopram (LEXAPRO) 10 mg tablet Take 1 tablet (10 mg total) by mouth daily at bedtime 30 tablet 2    famotidine (PEPCID) 20 mg tablet Take 20 mg by mouth daily Resume on 8/14      gabapentin (NEURONTIN) 250 mg/5 mL solution Take 12 mL (600 mg total) by mouth daily at bedtime 470 mL 2    glucose 4 g chewable tablet Chew 3 tablets (12 g total) as needed for low blood sugar 50 tablet 0    glucose blood (ONE TOUCH ULTRA TEST) test strip 1 each by Other route 3 (three) times a day Use as instructed 270 each 1    Incontinence Supplies (MALE URINAL) MISC by Does not apply route daily 6 each 3    insulin aspart (NovoLOG) 100 units/mL injection Inject 4 Units under the skin 3 (three) times a day before meals plus scale  Scale - 150-200 -2 units, 201-250-4 units, 251-300 -6 units, 301-350-8 units, > 350- 10 units 50 mL 0    insulin glargine (LANTUS) 100 units/mL subcutaneous injection Inject 20 Units under the skin daily 10 mL 0    Insulin Syringe-Needle U-100 (B-D INS SYR ULTRAFINE  3CC/30G) 30G X 1/2" 0 3 ML MISC by Does not apply route 4 (four) times a day 360 each 1    Insulin Syringe-Needle U-100 (B-D INS SYRINGE 0 5CC/30GX1/2") 30G X 1/2" 0 5 ML MISC Inject under the skin 4 (four) times a day 360 each 1    lactulose 20 g/30 mL Take 30 mL (20 g total) by mouth daily as needed (constipation) 473 mL 0    LUCENTIS 0 3 MG/0 05ML       ONETOUCH DELICA LANCETS 90A MISC by Does not apply route 3 (three) times a day 270 each 1     No current facility-administered medications for this visit          Social History:  Social History     Socioeconomic History    Marital status: /Civil Union     Spouse name: None    Number of children: None    Years of education: None    Highest education level: None   Occupational History    Occupation: disabled   Social Needs    Financial resource strain: None    Food insecurity:     Worry: None     Inability: None   Shannon Roles Transportation needs:     Medical: None     Non-medical: None   Tobacco Use    Smoking status: Never Smoker    Smokeless tobacco: Never Used   Substance and Sexual Activity    Alcohol use: No    Drug use: No    Sexual activity: Not Currently   Lifestyle    Physical activity:     Days per week: None     Minutes per session: None    Stress: None   Relationships    Social connections:     Talks on phone: None     Gets together: None     Attends [employer] service: None     Active member of club or organization: None     Attends meetings of clubs or organizations: None     Relationship status: None    Intimate partner violence:     Fear of current or ex partner: None     Emotionally abused: None     Physically abused: None     Forced sexual activity: None   Other Topics Concern    None   Social History Narrative    Daily caffeine consumption 2-3 servings a day        Review of Systems   Constitutional: Negative for chills and fever  Respiratory: Negative for cough and shortness of breath  Cardiovascular: Negative for chest pain and leg swelling  Gastrointestinal: Negative for constipation, diarrhea, nausea and vomiting  Skin: Positive for color change  Neurological: Positive for numbness  Psychiatric/Behavioral: Negative for confusion  Objective:      /86 (BP Location: Right arm, Patient Position: Sitting, Cuff Size: Adult)   Pulse 72   Ht 5' 8" (1 727 m) Comment: verbal  Wt 109 kg (240 lb 12 8 oz)   BMI 36 61 kg/m²          Physical Exam   Constitutional: He is oriented to person, place, and time  He appears well-developed and well-nourished  No distress  HENT:   Head: Normocephalic and atraumatic  Neck: Normal range of motion  Neck supple  Cardiovascular: Normal rate, regular rhythm and intact distal pulses  Pulmonary/Chest: Effort normal and breath sounds normal  No respiratory distress  Musculoskeletal:   No acute joint pain or edema  No redness  No ecchymosis  Hammertoe deformity noted  Neurological: He is alert and oriented to person, place, and time  A sensory deficit is present  Skin: Skin is warm  Capillary refill takes less than 2 seconds  No erythema  No pallor  Dry hematoma on right 2nd toenail  Nail plate is intact without signs of infection  Wound noted right shin (1 0 X 0 7 cm)  No infection  It looks healing  Psychiatric: He has a normal mood and affect  His behavior is normal    Vitals reviewed

## 2022-01-10 NOTE — TELEPHONE ENCOUNTER
----- Message from Naomi Noe DO sent at 1/10/2022 12:09 PM EST -----  (pls call his wife/Eliana): Asad's COVID test is back and positive  He needs to isolate at home and all household members or visitors should call their dr to see if they need to be tested as well    Let me know if questions

## 2022-01-10 NOTE — PROGRESS NOTES
COVID-19 Outpatient Progress Note    Assessment/Plan:    Problem List Items Addressed This Visit     ESRD on dialysis [facility])      Other Visit Diagnoses     Hospital discharge follow-up    -  Primary    COVID-19 virus infection             Disposition:     I recommended continued isolation until at least 24 hours have passed since recovery defined as resolution of fever without the use of fever-reducing medications AND improvement in COVID symptoms AND 10 days have passed since onset of symptoms (or 10 days have passed since date of first positive viral diagnostic test for asymptomatic patients)  Brooke Mckinney is able to manage at home  He feels better from acute gastroenteritis and COVID-19 infection  They were unable to complete a video visit today due to technical issues with wife's smartphone  All questions answered/addressed  I have spent 15 minutes directly with the patient  Greater than 50% of this time was spent in counseling/coordination of care regarding: diagnostic results, risks and benefits of treatment options, instructions for management, patient and family education and impressions  Encounter provider Everett Parekh DO    Provider located at [address] [place] [address]  [address]ia [address]  [phone]    Recent Visits  Date Type Provider Dept   01/07/22 Telephone [facility]   Showing recent visits within past 7 days and meeting all other requirements  Today's Visits  Date Type Provider Dept   01/10/22 Telephone [id number] Internal Med   01/10/22 Telemedicine [facility]   Showing today's visits and meeting all other requirements  Future Appointments  No visits were found meeting these conditions    Showing future appointments within next 150 days and meeting all other requirements     This virtual check-in was done via telephone and he agrees to proceed  Patient agrees to participate in a virtual check in via telephone or video visit instead of presenting to the office to address urgent/immediate medical needs  Patient is aware this is a billable service  After connecting through Telephone, the patient was identified by name and date of birth  Darinel Simon was informed that this was a telemedicine visit and that the exam was being conducted confidentially over secure lines  My office door was closed  No one else was in the room  Darinel Simon acknowledged consent and understanding of privacy and security of the telemedicine visit  I informed the patient that I have reviewed his record in Epic and presented the opportunity for him to ask any questions regarding the visit today  The patient agreed to participate  It was my intent to perform this visit via video technology but the patient was not able to do a video connection so the visit was completed via audio telephone only  Verification of patient location:  Patient is located in the following state in which I hold an active license: [place]    Subjective: Darinel Simon is a 64 y o  male who has been screened for COVID-19  Symptom change since last report: improving  Patient is currently asymptomatic  Patient denies fever, chills, fatigue, malaise, congestion, rhinorrhea, sore throat, anosmia, loss of taste, cough, shortness of breath, chest tightness, abdominal pain, nausea, vomiting, diarrhea, myalgias and headaches  - Date of positive COVID-19 PCR: 1/7/2022  Type of test: PCR    COVID-19 vaccination status: Fully vaccinated with booster    Tatum Cosme has been staying home and has isolated themselves in his home  He is taking care to not share personal items and is cleaning all surfaces that are touched often, like counters, tabletops, and doorknobs using household cleaning sprays or wipes  He is wearing a mask when he leaves his room       Here for hospital discharge follow up and COVID-19 positive  He went to [facility] fro diarrhea and was dx'd as having gastroenteritis  He was treated and sent home  He has since recovered from acute gastroenteritis but was possibly exposed to a family member last week who had COVID-19 infection  He went to get tested and his test came back positive  His wife's test came back negative  He is ESRD on dialysis and has no symptoms from COVID-19  He had COVID-19 about a year or more ago as well  He is isolating and is trying to lose 10 lbs to get back on the kidney transplant list at [facility]  He will have to go to COVID+ dialysis facility again in [place], [place] until he is done with isolating per dialysis center  ROS Otherwise negative, no other complaints  Lab Results   Component Value Date    SARSCOV2 Positive (A) 01/07/2022    SARSCOV2 Negative 07/27/2021     Past Medical History:   Diagnosis Date    Chronic kidney disease     Diabetes mellitus ([facility] [address] )     GERD (gastroesophageal reflux disease)     Hypercholesteremia     Hyperlipidemia     Hypertension     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    Neuropathy     Obesity     Osteomyelitis ([facility] [address] )     last assessed 11/4/16    PVC's (premature ventricular contractions)     sees cardiology Dr Jennifer camargo    Stroke [facility])     last weeof July 2018 [address] [facility]     Past Surgical History:   Procedure Laterality Date    ABDOMINAL SURGERY      CHOLECYSTECTOMY      Percutaneous    COLONOSCOPY      CYSTOSCOPY      OTHER SURGICAL HISTORY      "stimulator to control bowel movements"    NM ESOPHAGOGASTRODUODENOSCOPY TRANSORAL DIAGNOSTIC N/A 9/27/2016    Procedure: ESOPHAGOGASTRODUODENOSCOPY (EGD); Surgeon: Bipin Velasquez MD;  [facility];   Service: Gastroenterology    NM LAP,CHOLECYSTECTOMY N/A 2/29/2016    Procedure: LAPAROSCOPIC CHOLECYSTECTOMY ;  Surgeon: Karen Quezada DO;  [facility];  Service: General    ROTATOR CUFF REPAIR Right  TOE AMPUTATION Right 10/28/2016    Procedure: 3RD TOE AMPUTATION ;  Surgeon: Mauro Goodson DPM;  [facility];  Service:      Current Outpatient Medications   Medication Sig Dispense Refill    aspirin (ECOTRIN LOW STRENGTH) 81 mg EC tablet Take 81 mg by mouth daily Resume on 8/14 (Patient not taking: Reported on 12/13/2021 )      atorvastatin (LIPITOR) 40 mg tablet Take 1 tablet (40 mg total) by mouth daily with dinner 90 tablet 1    BD Pen Needle Adina U/F 32G X 4 MM MISC USE TO INJECT INSULIN 4 TIMES DAILY 400 each 1    Blood Glucose Monitoring Suppl (True Metrix Meter) w/Device KIT Use to test blood sugars 3 times daily 1 kit 0    Blood Pressure Monitoring (BLOOD PRESSURE CUFF) MISC Use to check blood pressure before taking blood pressure medication and 1 hour after and follow instructions provided in discharge instructions based on the readings  1 each 0    calcium acetate (CALPHRON) 667 mg TAKE 2 TABLETS BY MOUTH THREE TIMES DAILY WITH MEALS      carvedilol (COREG) 6 25 mg tablet Take 1 tablet (6 25 mg total) by mouth 2 (two) times a day with meals (Patient taking differently: Take 6 25 mg by mouth 2 (two) times a day with meals Every evening and su-tu-th-sa morning ) 180 tablet 1    Cholecalciferol (Vitamin D3) 1 25 MG (03075 UT) CAPS TAKE 1 CAPSULE BY MOUTH ONE TIME PER WEEK 12 capsule 2    Continuous Blood Gluc  (DEXCOM G6 ) LANCE Use as directed for continuous glucose monitoring 1 Device 0    Continuous Blood Gluc Sensor (DEXCOM G6 SENSOR) MISC Use as directed for continuous glucose monitoring   Change every 10 days 1 each 11    Continuous Blood Gluc Transmit (DEXCOM G6 TRANSMITTER) MISC Use as directed for continuous glucose monitoring-Change every 3 months 1 each 3    doxercalciferol (HECTOROL) 0 5 mcg capsule 4 mcg      escitalopram (LEXAPRO) 5 mg tablet TAKE 1 TABLET BY MOUTH EVERYDAY AT BEDTIME 30 tablet 1    glucose blood (True Metrix Blood Glucose Test) test strip Use 1 each 3 (three) times a day Use as instructed 100 each 5    heparin 1000 units in sterile water 3000 mL irrigation bag Heparin Sodium (Porcine) 1,000 Units/mL Systemic      Incontinence Supplies (MALE URINAL) MISC by Does not apply route daily 6 each 3    insulin lispro (HumaLOG KwikPen) 100 units/mL injection pen INJECT 4 UNITS 3 TIMES A DAY WITH MEALS PLUS SCALE 15 mL 1    Insulin Syringe-Needle U-100 (B-D INS SYRINGE 0 5CC/30GX1/2") 30G X 1/2" 0 5 ML MISC Inject under the skin 4 (four) times a day 360 each 1    iron sucrose (VENOFER) 50 mg Once every 2 weeks      Lantus 100 UNIT/ML subcutaneous injection INJECT 14 UNITS UNDER THE SKIN DAILY 10 mL 1    Nutritional Supplements (VITAMIN D BOOSTER PO) Take 0 5 mcg by mouth      ONETOUCH DELICA LANCETS 82D MISC by Does not apply route 3 (three) times a day 270 each 1    TORSEMIDE PO 50 mg         No current facility-administered medications for this visit  No Known Allergies    Review of Systems   Constitutional: Negative for chills, fatigue and fever  HENT: Negative for congestion, rhinorrhea and sore throat  Respiratory: Negative for cough, chest tightness and shortness of breath  Gastrointestinal: Negative for abdominal pain, diarrhea, nausea and vomiting  Musculoskeletal: Negative for myalgias  Allergic/Immunologic: Negative for immunocompromised state  Neurological: Negative for headaches  Objective: There were no vitals filed for this visit  VIRTUAL VISIT DISCLAIMER    Alba Sanchez verbally agrees to participate in [employer]  Pt is aware that [employer] could be limited without vital signs or the ability to perform a full hands-on physical exam  Asad Staley understands he or the provider may request at any time to terminate the video visit and request the patient to seek care or treatment in person

## 2022-01-12 NOTE — PHYSICIAN ADVISOR
Current patient class: Inpatient  The patient is currently on Hospital Day: 2 at [address]      The patient was admitted to the hospital  on 1/2/22 at  3:43 AM for the following diagnosis:  Diarrhea [R19 7]  Gastroenteritis [K52 9]  Vomiting [R11 10]  Weakness [R53 1]  [facility] [N18 6, Z99 2]     After review of the relevant documentation, labs, vital signs and test results, this is a PROVIDER LIABLE case  In this particular case the patient was admitted to the hospital as an inpatient  The patient however failed to satisfy the 2 midnight benchmark and closer scrutiny of the case was warranted  After review of the patient presentation and relevant labs the patient was most appropriate for observation or outpatient class at the time of admission  Given that this patient has already been discharged prior to this review they become a provider liable case  Rationale is as follows: The patient is a 60-year-old male with end-stage renal disease who presented with multiple episodes of nausea, vomiting, diarrhea  Fortunately he was described as systemically well, afebrile  When the attending provider assessed him later the same day, he denied abdominal pain and nausea  It was anticipated if he continued to do well he could be discharged the following day  The patient was diagnosed with enteritis and was hospitalized for one midnight total   He improved with symptomatic measures  Based on his length of stay in diagnosis, observation class would have been appropriate to determine if he had persistent gastrointestinal symptoms requiring further hospital management          The patients vitals on arrival were   ED Triage Vitals [01/01/22 2155]   Temperature Pulse Respirations Blood Pressure SpO2   99 4 °F (37 4 °C) 97 18 138/76 98 %      Temp Source Heart Rate Source Patient Position - Orthostatic VS BP Location FiO2 (%)   Oral Monitor Sitting Right arm --      Pain Score       8           Past Medical History:   Diagnosis Date    Chronic kidney disease     [facility] mellitus ([facility] [address] )     GERD (gastroesophageal reflux disease)     Hypercholesteremia     Hyperlipidemia     Hypertension     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    Neuropathy     Obesity     Osteomyelitis ([facility] [address] )     last assessed 11/4/16    PVC's (premature ventricular contractions)     sees cardiology Dr Mahin camargo    Stroke [facility])     last weeof July 2018 [address] [facility]     Past Surgical History:   Procedure Laterality Date    ABDOMINAL SURGERY      CHOLECYSTECTOMY      Percutaneous    COLONOSCOPY      CYSTOSCOPY      OTHER SURGICAL HISTORY      "stimulator to control bowel movements"    AR ESOPHAGOGASTRODUODENOSCOPY TRANSORAL DIAGNOSTIC N/A 9/27/2016    Procedure: ESOPHAGOGASTRODUODENOSCOPY (EGD); Surgeon: Zoe Arias MD;  [facility]; Service: Gastroenterology    AR LAP,CHOLECYSTECTOMY N/A 2/29/2016    Procedure: LAPAROSCOPIC CHOLECYSTECTOMY ;  Surgeon: Gio Miller DO;  [facility];  Service: General    ROTATOR CUFF REPAIR Right     TOE AMPUTATION Right 10/28/2016    Procedure: 3RD TOE AMPUTATION ;  Surgeon: Mauro Goodson DPM;  [facility];  Service:            Consults have been placed to:   [address]:    01/03/22 1030 01/03/22 1100 01/03/22 1130 01/03/22 1200   BP: 137/75 162/75 111/63 109/88   BP Location: Right arm Right arm Right arm    Pulse: 87 91 94 103   Resp: 18 18 18 18   Temp:    97 7 °F (36 5 °C)   TempSrc:    Oral   SpO2:       Weight:       Height:           Most recent labs:    No results for input(s): WBC, HGB, HCT, PLT, K, NA, CALCIUM, BUN, CREATININE, LIPASE, AMYLASE, INR, TROPONINI, CKTOTAL, AST, ALT, ALKPHOS, BILITOT in the last 72 hours  Scheduled Meds:  Continuous Infusions:No current facility-administered medications for this encounter  PRN Meds:      Surgical procedures (if appropriate):

## 2022-01-19 ENCOUNTER — TELEPHONE (OUTPATIENT)
Dept: PODIATRY | Facility: CLINIC | Age: 62
End: 2022-01-19

## 2022-01-19 ENCOUNTER — OFFICE VISIT (OUTPATIENT)
Dept: NEUROLOGY | Facility: CLINIC | Age: 62
End: 2022-01-19
Payer: MEDICARE

## 2022-01-19 VITALS
WEIGHT: 238.3 LBS | TEMPERATURE: 97 F | BODY MASS INDEX: 35.29 KG/M2 | HEIGHT: 69 IN | HEART RATE: 75 BPM | SYSTOLIC BLOOD PRESSURE: 128 MMHG | DIASTOLIC BLOOD PRESSURE: 76 MMHG

## 2022-01-19 DIAGNOSIS — Z79.4 TYPE 2 DIABETES MELLITUS WITH CHRONIC KIDNEY DISEASE ON CHRONIC DIALYSIS, WITH LONG-TERM CURRENT USE OF INSULIN (HCC): ICD-10-CM

## 2022-01-19 DIAGNOSIS — I63.312 CEREBROVASCULAR ACCIDENT (CVA) DUE TO THROMBOSIS OF LEFT MIDDLE CEREBRAL ARTERY (HCC): ICD-10-CM

## 2022-01-19 DIAGNOSIS — R45.86 EMOTIONAL LABILITY: ICD-10-CM

## 2022-01-19 DIAGNOSIS — N18.5 BENIGN HYPERTENSION WITH CHRONIC KIDNEY DISEASE, STAGE V (HCC): ICD-10-CM

## 2022-01-19 DIAGNOSIS — E78.2 MIXED HYPERLIPIDEMIA: ICD-10-CM

## 2022-01-19 DIAGNOSIS — I12.0 BENIGN HYPERTENSION WITH CHRONIC KIDNEY DISEASE, STAGE V (HCC): ICD-10-CM

## 2022-01-19 DIAGNOSIS — N18.6 TYPE 2 DIABETES MELLITUS WITH CHRONIC KIDNEY DISEASE ON CHRONIC DIALYSIS, WITH LONG-TERM CURRENT USE OF INSULIN (HCC): ICD-10-CM

## 2022-01-19 DIAGNOSIS — R94.01 ABNORMAL EEG: ICD-10-CM

## 2022-01-19 DIAGNOSIS — Z99.2 TYPE 2 DIABETES MELLITUS WITH CHRONIC KIDNEY DISEASE ON CHRONIC DIALYSIS, WITH LONG-TERM CURRENT USE OF INSULIN (HCC): ICD-10-CM

## 2022-01-19 DIAGNOSIS — E11.22 TYPE 2 DIABETES MELLITUS WITH CHRONIC KIDNEY DISEASE ON CHRONIC DIALYSIS, WITH LONG-TERM CURRENT USE OF INSULIN (HCC): ICD-10-CM

## 2022-01-19 DIAGNOSIS — Z86.73 HISTORY OF STROKE: Primary | ICD-10-CM

## 2022-01-19 DIAGNOSIS — S91.332A PENETRATING FOOT WOUND, LEFT, INITIAL ENCOUNTER: ICD-10-CM

## 2022-01-19 PROBLEM — G45.9 TIA (TRANSIENT ISCHEMIC ATTACK): Status: RESOLVED | Noted: 2018-10-28 | Resolved: 2022-01-19

## 2022-01-19 PROCEDURE — 99214 OFFICE O/P EST MOD 30 MIN: CPT | Performed by: PSYCHIATRY & NEUROLOGY

## 2022-01-19 NOTE — PATIENT INSTRUCTIONS
Stroke: Kameron Garcia presents with this family for a follow-up with regard to his prior stroke  He has not had any recurrent stroke symptoms  He takes his medication as prescribed  He did not endorse any bleeding or bruising issues and his most recent lab work looks good although I do not have a recent cholesterol panel for him  More recently he has started to experience some episodes that could be consistent with emotional outbursts or rapid cycling from 1 emotional state to another although the actual nature of the events is not quite clear  He is also experiencing issues with insomnia manifesting as awakenings in the middle of the night with difficulty getting back to sleep  - for ongoing stroke prevention he should continue his combination of aspirin, Lipitor, and appropriate blood pressure and glycemic control  -we will defer to his primary care team for monitoring of his cholesterol panel and blood sugar numbers  We would suggest targeting a hemoglobin A1c of less than 7% and an LDL cholesterol of less than 70  We would recommend his cholesterol panel to be checked if it has not been done within the last 6 months   -with regard to sleep I would strongly encourage him to initiate appropriate sleep hygiene measures including no naps after 1400 hours or caffeine after 1500 hours, 30 minutes prior to bedtime turning off all screens and lowering the lights if possible, remaining out of bed until it is time to sleep at approximately 2200 hours  - escitalopram was recently started for him to help with sleep but more typically this medication causes insomnia therefore I will discontinue at this point in time while we are keeping track of his emotional changes over the next 2 weeks  In particular I would like to keep track of what emotional changes are happening and help bothersome they are both to him and to his family over the next 2 weeks in addition to his adjustment in sleep schedule    We will consider adding a mood stabilizer or a medication for sleep or both as indicated depending on how he is doing   - I would like for him to remain physically active  Regular trips out of the home would be helpful for his overall mental well being  Obviously this should be done in a safe manner in the setting of the current pandemic     - at this point we will not initiate fitness to drive testing but can do so at his discretion in the future     I will plan for him to return to the office in 3 months time with me directly but would be happy to see him sooner if the need should arise  If he has any symptoms concerning for TIA or stroke including sudden painless loss of vision or double vision, difficulty speaking or swallowing, vertigo/room spinning that does not quickly resolve, or weakness/numbness affecting 1 side of the face or body he should proceed by ambulance to the nearest emergency room immediately  There if his sleep and mood symptoms both improved significantly over the next month or 2 and he otherwise has no new stroke symptoms he could push back his follow-up appointment for 8-12 months

## 2022-01-19 NOTE — PROGRESS NOTES
Patient ID: Nelson Montez is a 64 y o  male  Assessment/Plan:   Patient Instructions   Stroke: Kadeem Karsten presents with this family for a follow-up with regard to his prior stroke  He has not had any recurrent stroke symptoms  He takes his medication as prescribed  He did not endorse any bleeding or bruising issues and his most recent lab work looks good although I do not have a recent cholesterol panel for him  More recently he has started to experience some episodes that could be consistent with emotional outbursts or rapid cycling from 1 emotional state to another although the actual nature of the events is not quite clear  He is also experiencing issues with insomnia manifesting as awakenings in the middle of the night with difficulty getting back to sleep  - for ongoing stroke prevention he should continue his combination of aspirin, Lipitor, and appropriate blood pressure and glycemic control  -we will defer to his primary care team for monitoring of his cholesterol panel and blood sugar numbers  We would suggest targeting a hemoglobin A1c of less than 7% and an LDL cholesterol of less than 70  We would recommend his cholesterol panel to be checked if it has not been done within the last 6 months   -with regard to sleep I would strongly encourage him to initiate appropriate sleep hygiene measures including no naps after 1400 hours or caffeine after 1500 hours, 30 minutes prior to bedtime turning off all screens and lowering the lights if possible, remaining out of bed until it is time to sleep at approximately 2200 hours  - escitalopram was recently started for him to help with sleep but more typically this medication causes insomnia therefore I will discontinue at this point in time while we are keeping track of his emotional changes over the next 2 weeks    In particular I would like to keep track of what emotional changes are happening and help bothersome they are both to him and to his family over the next 2 weeks in addition to his adjustment in sleep schedule  We will consider adding a mood stabilizer or a medication for sleep or both as indicated depending on how he is doing   - I would like for him to remain physically active  Regular trips out of the home would be helpful for his overall mental well being  Obviously this should be done in a safe manner in the setting of the current pandemic     - at this point we will not initiate fitness to drive testing but can do so at his discretion in the future     I will plan for him to return to the office in 3 months time with me directly but would be happy to see him sooner if the need should arise  If he has any symptoms concerning for TIA or stroke including sudden painless loss of vision or double vision, difficulty speaking or swallowing, vertigo/room spinning that does not quickly resolve, or weakness/numbness affecting 1 side of the face or body he should proceed by ambulance to the nearest emergency room immediately  There if his sleep and mood symptoms both improved significantly over the next month or 2 and he otherwise has no new stroke symptoms he could push back his follow-up appointment for 8-12 months  Diagnoses and all orders for this visit:    History of stroke    Cerebrovascular accident (CVA) due to thrombosis of left middle cerebral artery ([address] )  -     Ambulatory referral to Neurology    Type 2 diabetes mellitus with chronic kidney disease on chronic dialysis, with long-term current use of insulin ([address] )    Benign hypertension with chronic kidney disease, stage V (HCC)    Mixed hyperlipidemia    Abnormal EEG    Penetrating foot wound, left, initial encounter           Subjective:    HPI    Have you had any new stroke like symptoms that were not previously present (Sudden loss of vision, difficulty speaking or swallowing,  vertigo/room spinning that did not quickly resolve, weakness or numbness affecting one side of the body)? no    Are you taking all of your medications as prescribed? Yes    Any side effects including bleeding/bruising? No    Natty Mcdowell presents with his wife and son for a follow-up with regard to his prior stroke and some new symptoms  They do not report any new stroke-like concerns as described above and he continues to take his medication as prescribed  They note that over the course of the past year he has had some emotional changes  There is not agreement between the 3 of them in terms of the nature, severity, and frequency of those changes  The patient reports being relatively unaware of any issue  His wife describes that he will rapidly cycle between being joyful and tearful and his emotional states seem extreme  She would also characterize him as having anxiety although he denies any issue with a sense of fear or sense of doom  They also described that at times when he is happy the emotional state is somewhat extreme with an almost child-like exuberance including some vocalizations  They also report that he has intermittent confusion  He remains independent in his basic activities of daily living  He does have interest in driving but over concerns that if he did not do well at this point in time he might lose his 's license and in the hope that he may improve in the future if he were to qualify for a kidney transplant he prefers not to have fitness to drive testing performed yet which is quite reasonable and shows relatively intact insight  They described that night he will go to bed at approximately 2000 hours and then awaken in the middle of the night between 0000 hours and 0200 hours and remain awake for a couple of hours    In an effort to help provide improved sleep he was recently prescribed escitalopram however often this medication may actually cause a degree of insomnia and at this point in time they agree that we should discontinue that medication and monitor with improved sleep hygiene initially and then consider medication if indicated thereafter  They do not endorse any significant tremor and he is not experiencing any hallucinations  They also agree that he does seem to be experiencing the emotional states that are being expressed rather than this being a product of pseudobulbar affect      Lab Results   Component Value Date/Time    HGBA1C 6 4 12/09/2021 05:41 PM    HGBA1C 6 5 (H) 06/18/2021 08:10 AM    HGBA1C 5 8 (H) 06/08/2020 08:17 AM    LDLCALC 20 01/11/2020 08:29 AM         Past Medical History:   Diagnosis Date    Chronic kidney disease     Diabetes mellitus ([facility] )     GERD (gastroesophageal reflux disease)     Hypercholesteremia     Hyperlipidemia     Hypertension     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    Neuropathy     Obesity     Osteomyelitis ([facility] )     last assessed 11/4/16    PVC's (premature ventricular contractions)     se[facility] cardiology Dr Bo [facility]     [facility] [address] [facility]     Socioeconomic History    Marital status: /Civil Union     Spouse name: Not on file    Number of children: Not on file    Years of education: Not on file    Highest education level: Not asked   Occupational History    Occupation: disabled   Tobacco Use    Smoking status: Never Smoker    Smokeless tobacco: Never Used   Vaping Use    Vaping Use: Never used   Substance and Sexual Activity    Alcohol use: Not Currently    Drug use: No    Sexual activity: Not Currently   Other Topics Concern    Not on file   Social History Narrative    Daily caffeine consumption 2-3 servings a day     Social Determinants of Health     Financial Resource Strain: Unknown    Difficulty of Paying Living Expenses: Patient refused   Food Insecurity: Unknown    Worried About Running Out of Food in the Last Year: Patient refused    [address] in the Last Year: Patient refused   Transportation Needs: No Transportation Needs  Lack of Transportation (Medical): No    Lack of Transportation (Non-Medical): No   Physical Activity: Not on file   Stress: Not on file   Social Connections: Not on file   Intimate Partner Violence: Not on file   Housing Stability: Not on file         Current Outpatient Medications:     aspirin (ECOTRIN LOW STRENGTH) 81 mg EC tablet, Take 81 mg by mouth daily Resume on 8/14 (Patient not taking: Reported on 12/13/2021 ), Disp: , Rfl:     atorvastatin (LIPITOR) 40 mg tablet, Take 1 tablet (40 mg total) by mouth daily with dinner, Disp: 90 tablet, Rfl: 1    BD Pen Needle Adina U/F 32G X 4 MM MISC, USE TO INJECT INSULIN 4 TIMES DAILY, Disp: 400 each, Rfl: 1    Blood Glucose Monitoring Suppl (True Metrix Meter) w/Device KIT, Use to test blood sugars 3 times daily, Disp: 1 kit, Rfl: 0    Blood Pressure Monitoring (BLOOD PRESSURE CUFF) MISC, Use to check blood pressure before taking blood pressure medication and 1 hour after and follow instructions provided in discharge instructions based on the readings  , Disp: 1 each, Rfl: 0    calcium acetate (CALPHRON) 667 mg, TAKE 2 TABLETS BY MOUTH THREE TIMES DAILY WITH MEALS, Disp: , Rfl:     carvedilol (COREG) 6 25 mg tablet, Take 1 tablet (6 25 mg total) by mouth 2 (two) times a day with meals (Patient taking differently: Take 6 25 mg by mouth 2 (two) times a day with meals Every evening and su-tu-[id number]-sa morning ), Disp: 180 tablet, Rfl: 1    Cholecalciferol (Vitamin D3) 1 25 MG (94093 UT) CAPS, TAKE 1 CAPSULE BY MOUTH ONE TIME PER WEEK, Disp: 12 capsule, Rfl: 2    Continuous Blood Gluc  (DEXCOM G6 ) LANCE, Use as directed for continuous glucose monitoring, Disp: 1 Device, Rfl: 0    Continuous Blood Gluc Sensor (DEXCOM G6 SENSOR) MISC, Use as directed for continuous glucose monitoring   Change every 10 days, Disp: 1 each, Rfl: 11    Continuous Blood Gluc Transmit (DEXCOM G6 TRANSMITTER) MISC, Use as directed for continuous glucose monitoring-Change every 3 months, Disp: 1 each, Rfl: 3    doxercalciferol (HECTOROL) 0 5 mcg capsule, 4 mcg, Disp: , Rfl:     escitalopram (LEXAPRO) 5 mg tablet, TAKE 1 TABLET BY MOUTH EVERYDAY AT BEDTIME, Disp: 30 tablet, Rfl: 1    glucose blood (True Metrix Blood Glucose Test) test strip, Use 1 each 3 (three) times a day Use as instructed, Disp: 100 each, Rfl: 5    heparin 1000 units in sterile water 3000 mL irrigation bag, Heparin Sodium (Porcine) 1,000 Units/mL Systemic, Disp: , Rfl:     Incontinence Supplies (MALE URINAL) MISC, by Does not apply route daily, Disp: 6 each, Rfl: 3    insulin lispro (HumaLOG KwikPen) 100 units/mL injection pen, INJECT 4 UNITS 3 TIMES A DAY WITH MEALS PLUS SCALE, Disp: 15 mL, Rfl: 1    Insulin Syringe-Needle U-100 (B-D INS SYRINGE 0 5CC/30GX1/2") 30G X 1/2" 0 5 ML MISC, Inject under the skin 4 (four) times a day, Disp: 360 each, Rfl: 1    iron sucrose (VENOFER), 50 mg Once every 2 weeks, Disp: , Rfl:     Lantus 100 UNIT/ML subcutaneous injection, INJECT 14 UNITS UNDER THE SKIN DAILY, Disp: 10 mL, Rfl: 1    Nutritional Supplements (VITAMIN D BOOSTER PO), Take 0 5 mcg by mouth, Disp: , Rfl:     ONETOUCH DELICA LANCETS 94D MISC, by Does not apply route 3 (three) times a day, Disp: 270 each, Rfl: 1    TORSEMIDE PO, 50 mg  , Disp: , Rfl:     No Known Allergies                Objective:    /76 (BP Location: Right arm, Patient Position: Sitting, Cuff Size: Standard)   Pulse 75   Temp (!) 97 °F (36 1 °C) (Tympanic)   Ht 5' 9" (1 753 m)   Wt 108 kg (238 lb 4 8 oz)   BMI 35 19 kg/m²       Physical Exam    Neurological Exam    At the time of my discussion he was awake, alert, and in no distress  He has mild-to-moderate dysarthria with no clear aphasia  He has somewhat of a paucity of spontaneous speech but comments are contextually appropriate  There are no severe lateralizing signs  He is able to rise relatively easily and his gait is stable    During the course of our discussion he was somewhat emotionally labile and became tearful once or twice but his affect was bright with no clear psychomotor slowing or masked face  His speech was not monotone  His movements were animated but not agitated  ROS:    Review of Systems   Constitutional: Negative  Negative for appetite change and fever  HENT: Negative  Negative for hearing loss, tinnitus, trouble swallowing and voice change  Eyes: Negative  Negative for photophobia and pain  Respiratory: Negative  Negative for shortness of breath  Cardiovascular: Negative  Negative for palpitations  Gastrointestinal: Negative  Negative for nausea and vomiting  Endocrine: Negative  Negative for cold intolerance  Genitourinary: Negative  Negative for dysuria, frequency and urgency  Musculoskeletal: Positive for gait problem (pain in the left foot )  Negative for myalgias and neck pain  Skin: Negative  Negative for rash  Neurological: Positive for headaches  Negative for dizziness, tremors, seizures, syncope, facial asymmetry, speech difficulty, weakness, light-headedness and numbness  Hematological: Negative  Does not bruise/bleed easily  Psychiatric/Behavioral: Positive for confusion and sleep disturbance  Negative for hallucinations  Reviewed ROS as entered by medical assistant  I have spent 30 minutes with Patient and family today including counseling/coordination of care regarding Risk factor reductions and Impressions as well as on chart review, communication, and documentation

## 2022-01-19 NOTE — TELEPHONE ENCOUNTER
Asad's next appt   Was scheduled with Dr Jenn Guaman for 2/3/2022 @ 145--it needs to be corrected but nothing available so I cannot correct-just a heads up!!

## 2022-01-30 ENCOUNTER — HOSPITAL ENCOUNTER (EMERGENCY)
Facility: HOSPITAL | Age: 62
Discharge: HOME | End: 2022-01-30
Attending: EMERGENCY MEDICINE
Payer: MEDICARE

## 2022-01-30 VITALS
TEMPERATURE: 97.8 F | OXYGEN SATURATION: 97 % | HEART RATE: 82 BPM | DIASTOLIC BLOOD PRESSURE: 87 MMHG | SYSTOLIC BLOOD PRESSURE: 172 MMHG | RESPIRATION RATE: 18 BRPM

## 2022-01-30 DIAGNOSIS — R21 RASH AND NONSPECIFIC SKIN ERUPTION: Primary | ICD-10-CM

## 2022-01-30 DIAGNOSIS — S50.811A ABRASION OF RIGHT FOREARM, INITIAL ENCOUNTER: ICD-10-CM

## 2022-01-30 DIAGNOSIS — S80.211A ABRASION, RIGHT KNEE, INITIAL ENCOUNTER: ICD-10-CM

## 2022-01-30 PROCEDURE — 90715 TDAP VACCINE 7 YRS/> IM: CPT | Performed by: PHYSICIAN ASSISTANT

## 2022-01-30 PROCEDURE — 96375 TX/PRO/DX INJ NEW DRUG ADDON: CPT

## 2022-01-30 PROCEDURE — 96374 THER/PROPH/DIAG INJ IV PUSH: CPT

## 2022-01-30 PROCEDURE — 96361 HYDRATE IV INFUSION ADD-ON: CPT

## 2022-01-30 PROCEDURE — 99284 EMERGENCY DEPT VISIT MOD MDM: CPT | Performed by: PHYSICIAN ASSISTANT

## 2022-01-30 PROCEDURE — 99283 EMERGENCY DEPT VISIT LOW MDM: CPT

## 2022-01-30 PROCEDURE — 90471 IMMUNIZATION ADMIN: CPT

## 2022-01-30 RX ORDER — CEPHALEXIN 250 MG/1
500 CAPSULE ORAL ONCE
Status: COMPLETED | OUTPATIENT
Start: 2022-01-30 | End: 2022-01-30

## 2022-01-30 RX ORDER — CEPHALEXIN 500 MG/1
500 CAPSULE ORAL EVERY 12 HOURS SCHEDULED
Qty: 10 CAPSULE | Refills: 0 | Status: SHIPPED | OUTPATIENT
Start: 2022-01-30 | End: 2022-02-04

## 2022-01-30 RX ORDER — DIPHENHYDRAMINE HCL 25 MG
25 TABLET ORAL EVERY 6 HOURS PRN
Qty: 8 TABLET | Refills: 0 | Status: SHIPPED | OUTPATIENT
Start: 2022-01-30 | End: 2022-04-07 | Stop reason: HOSPADM

## 2022-01-30 RX ORDER — DEXAMETHASONE SODIUM PHOSPHATE 4 MG/ML
10 INJECTION, SOLUTION INTRA-ARTICULAR; INTRALESIONAL; INTRAMUSCULAR; INTRAVENOUS; SOFT TISSUE ONCE
Status: COMPLETED | OUTPATIENT
Start: 2022-01-30 | End: 2022-01-30

## 2022-01-30 RX ORDER — DIPHENHYDRAMINE HYDROCHLORIDE 50 MG/ML
25 INJECTION INTRAMUSCULAR; INTRAVENOUS ONCE
Status: COMPLETED | OUTPATIENT
Start: 2022-01-30 | End: 2022-01-30

## 2022-01-30 RX ADMIN — DIPHENHYDRAMINE HYDROCHLORIDE 25 MG: 50 INJECTION, SOLUTION INTRAMUSCULAR; INTRAVENOUS at 20:58

## 2022-01-30 RX ADMIN — TETANUS TOXOID, REDUCED DIPHTHERIA TOXOID AND ACELLULAR PERTUSSIS VACCINE, ADSORBED 0.5 ML: 5; 2.5; 8; 8; 2.5 SUSPENSION INTRAMUSCULAR at 20:48

## 2022-01-30 RX ADMIN — SODIUM CHLORIDE 500 ML: 0.9 INJECTION, SOLUTION INTRAVENOUS at 20:47

## 2022-01-30 RX ADMIN — CEPHALEXIN 500 MG: 250 CAPSULE ORAL at 21:01

## 2022-01-30 RX ADMIN — FAMOTIDINE 20 MG: 10 INJECTION INTRAVENOUS at 20:56

## 2022-01-30 RX ADMIN — DEXAMETHASONE SODIUM PHOSPHATE 10 MG: 4 INJECTION, SOLUTION INTRAMUSCULAR; INTRAVENOUS at 20:54

## 2022-01-31 NOTE — ED PROVIDER NOTES
History  Chief Complaint   Patient presents with    Rash     Pt reports red rash that began two hours ago after eating dinner  Pt reports no new foods or medications were taken  Rash is covering extremities and torso  Patient is a 70-year-old male with history of CKD, diabetes mellitus on insulin chronically, hyperlipidemia, hypertension, colonoscopy, the presents emergency department with generalized rash to torso, lower back and lower legs for 2 hours  Patient denies associated symptomatology patient presents with his wife this evening that provides part patient history  Patient's wife states that patient and herself had eaten shrimp 2 hours prior to current ED presentation with patient rash ensuing approximately 10-15 minutes after shrimp ingestion  Patient wife denies patient allergy history  Patient wife also states that earlier today patient had slipped on the outside stairs and fell with his knees making contact with outside house wooden stairs with patient being ambulatory after aforementioned incident  Patient Tdap 2016  Patient denies recent antibiotic use  Patient denies new medications  Patient denies head strike  Patient denies [address]  Patient denies fevers, chills, nausea, vomiting, diarrhea, constipation, and urinary symptoms  Patient affirms recent fall; aforementioned, approximately 2 ft in height on wooden stairs  Patient denies recent trauma  Patient denies sick contacts or recent travel  Patient denies chest pain, shortness of breath, and abdominal pain  History provided by:  Patient   used: No        Prior to Admission Medications   Prescriptions Last Dose Informant Patient Reported? Taking?    BD Pen Needle Adina U/F 32G X 4 MM MISC  Spouse/Significant Other No No   Sig: USE TO INJECT INSULIN 4 TIMES DAILY   Blood Glucose Monitoring Suppl (True Metrix Meter) w/Device KIT  Spouse/Significant Other No No   Sig: Use to test blood sugars 3 times daily Blood Pressure Monitoring (BLOOD PRESSURE CUFF) MISC  Spouse/Significant Other No No   Sig: Use to check blood pressure before taking blood pressure medication and 1 hour after and follow instructions provided in discharge instructions based on the readings  Cholecalciferol (Vitamin D3) 1 25 MG (98404 UT) CAPS   No No   Sig: TAKE 1 CAPSULE BY MOUTH ONE TIME PER WEEK   Continuous Blood Gluc  (DEXCOM G6 ) LANCE  Spouse/Significant Other No No   Sig: Use as directed for continuous glucose monitoring   Continuous Blood Gluc Sensor (DEXCOM G6 SENSOR) MISC  Spouse/Significant Other No No   Sig: Use as directed for continuous glucose monitoring   Change every 10 days   Continuous Blood Gluc Transmit (DEXCOM G6 TRANSMITTER) MISC  Spouse/Significant Other No No   Sig: Use as directed for continuous glucose monitoring-Change every 3 months   Incontinence Supplies (MALE URINAL) MISC  Spouse/Significant Other No No   Sig: by Does not apply route daily   Insulin Syringe-Needle U-100 (B-D INS SYRINGE 0 5CC/30GX1/2") 30G X 1/2" 0 5 ML MISC  Spouse/Significant Other No No   Sig: Inject under the skin 4 (four) times a day   Lantus 100 UNIT/ML subcutaneous injection   No No   Sig: INJECT 14 UNITS UNDER THE SKIN DAILY   Nutritional Supplements (VITAMIN D BOOSTER PO)   Yes No   Sig: Take 0 5 mcg by mouth   Patient not taking: Reported on     [facility] M[facility]  Spouse/Significant Other No No   Sig: by Does not apply route 3 (three) times a day   TORSEMIDE PO  Spouse/Significant Other Yes No   Si mg     aspirin (ECOTRIN LOW STRENGTH) 81 mg EC tablet  Spouse/Significant Other Yes No   Sig: Take 81 mg by mouth daily Resume on      atorvastatin (LIPITOR) 40 mg tablet   No No   Sig: Take 1 tablet (40 mg total) by mouth daily with dinner   calcium acetate (CALPHRON) 667 mg   Yes No   Sig: TAKE 2 TABLETS BY MOUTH THREE TIMES DAILY WITH MEALS   carvedilol (COREG) 6 25 mg tablet Spouse/Significant Other No No   Sig: Take 1 tablet (6 25 mg total) by mouth 2 (two) times a day with meals   Patient taking differently: Take 6 25 mg by mouth 2 (two) times a day with meals Every evening and  morning    doxercalciferol (HECTOROL) 0 5 mcg capsule   Yes No   Si mcg   glucose blood (True Metrix Blood Glucose Test) test strip  Spouse/Significant Other No No   Sig: Use 1 each 3 (three) times a day Use as instructed   heparin 1000 units in sterile water 3000 mL irrigation bag   Yes No   Sig: Heparin Sodium (Porcine) 1,000 Units/mL Systemic   insulin lispro (HumaLOG KwikPen) 100 units/mL injection pen   No No   Sig: INJECT 4 UNITS 3 TIMES A DAY WITH MEALS PLUS SCALE   iron sucrose (VENOFER)   Yes No   Si mg Once every 2 weeks   Patient not taking: Reported on 2022       Facility-Administered Medications: None       Past Medical History:   Diagnosis Date    Cerebrovascular accident (CVA) due to thrombosis of left middle cerebral artery ([facility] [address] ) 2018    Chronic kidney disease     Diabetes mellitus ([facility] [address] )     GERD (gastroesophageal reflux disease)     Hypercholesteremia     Hyperlipidemia     Hypertension     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    Neuropathy     Obesity     Osteomyelitis ([facility] [address] )     last assessed 16    PVC's (premature ventricular contractions)     sees cardiology Dr Vicente camargo    Stroke [facility])     last weeof 2018 [address]    TIA (transient ischemic attack) 10/28/2018       Past Surgical History:   Procedure Laterality Date    ABDOMINAL SURGERY      CHOLECYSTECTOMY      Percutaneous    COLONOSCOPY      CYSTOSCOPY      OTHER SURGICAL HISTORY      "stimulator to control bowel movements"    [place] ESOPHAGOGASTRODUODENOSCOPY TRANSORAL DIAGNOSTIC N/A 2016    Procedure: ESOPHAGOGASTRODUODENOSCOPY (EGD); Surgeon: Alyssia Vincent MD;  [facility];   Service: Gastroenterology    [place] LAP,CHOLECYSTECTOMY N/A 2/29/2016    Procedure: LAPAROSCOPIC CHOLECYSTECTOMY ;  Surgeon: Nuha Toro DO;  [facility];  Service: General    ROTATOR CUFF REPAIR Right     TOE AMPUTATION Right 10/28/2016    Procedure: 3RD TOE AMPUTATION ;  Surgeon: Missy Thacker DPM;  [facility];  Service:        Family History   Problem Relation Age of Onset    Leukemia Mother     Liver disease Mother     Lung cancer Mother         heavy smoker - 3 ppd    Heart disease Father     Liver disease Father     Multiple myeloma Sister     Breast cancer Sister     Urolithiasis Family     Alcohol abuse Neg Hx     Depression Neg Hx     Drug abuse Neg Hx     Substance Abuse Neg Hx     Mental illness Neg Hx      I have reviewed and agree with the history as documented  E-Cigarette/Vaping    E-Cigarette Use Never User      E-Cigarette/Vaping Substances    Nicotine No     THC No     CBD No     Flavoring No     Other No     Unknown No      Social History     Tobacco Use    Smoking status: Never Smoker    Smokeless tobacco: Never Used   Vaping Use    Vaping Use: Never used   Substance Use Topics    Alcohol use: Not Currently    Drug use: No       Review of Systems   Constitutional: Negative for activity change, appetite change, chills and fever  HENT: Negative for congestion, postnasal drip, rhinorrhea, sinus pressure, sinus pain, sore throat and tinnitus  Eyes: Negative for photophobia and visual disturbance  Respiratory: Negative for cough, chest tightness and shortness of breath  Cardiovascular: Negative for chest pain and palpitations  Gastrointestinal: Negative for abdominal pain, constipation, diarrhea, nausea and vomiting  Genitourinary: Negative for difficulty urinating, dysuria, flank pain, frequency and urgency  Musculoskeletal: Negative for back pain, gait problem, neck pain and neck stiffness  Skin: Positive for rash and wound  Negative for pallor     Allergic/Immunologic: Negative for environmental allergies and food allergies  Neurological: Negative for dizziness, weakness, numbness and headaches  Psychiatric/Behavioral: Negative for confusion  All other systems reviewed and are negative  Physical Exam  Physical Exam  Vitals and nursing note reviewed  Constitutional:       General: He is awake  Appearance: Normal appearance  He is well-developed  He is not ill-appearing, toxic-appearing or diaphoretic  Comments: BP (!) 219/92 (BP Location: Right arm)   Pulse 84   Temp 97 8 °F (36 6 °C) (Oral)   Resp 20   SpO2 97%      HENT:      Head: Normocephalic and atraumatic  Right Ear: Hearing and external ear normal  No decreased hearing noted  No drainage, swelling or tenderness  No mastoid tenderness  Left Ear: Hearing and external ear normal  No decreased hearing noted  No drainage, swelling or tenderness  No mastoid tenderness  Nose: Nose normal       Mouth/Throat:      Lips: Pink  Mouth: Mucous membranes are moist       Pharynx: Oropharynx is clear  Uvula midline  Eyes:      General: Lids are normal  Vision grossly intact  Right eye: No discharge  Left eye: No discharge  Extraocular Movements: Extraocular movements intact  Conjunctiva/sclera: Conjunctivae normal       Pupils: Pupils are equal, round, and reactive to light  Neck:      Vascular: No JVD  Trachea: Trachea and phonation normal  No tracheal tenderness or tracheal deviation  Cardiovascular:      Rate and Rhythm: Normal rate and regular rhythm  Pulses: Normal pulses  Radial pulses are 2+ on the right side and 2+ on the left side  Heart sounds: Normal heart sounds  Pulmonary:      Effort: Pulmonary effort is normal       Breath sounds: Normal breath sounds  No stridor  No decreased breath sounds, wheezing, rhonchi or rales  Chest:      Chest wall: No tenderness  Abdominal:      General: Abdomen is flat   Bowel sounds are normal  There is no distension  Palpations: Abdomen is soft  Abdomen is not rigid  Tenderness: There is no abdominal tenderness  There is no guarding or rebound  Musculoskeletal:         General: Normal range of motion  Cervical back: Full passive range of motion without pain, normal range of motion and neck supple  No rigidity  No spinous process tenderness or muscular tenderness  Normal range of motion  Comments: Passive ROM intact  Upper and lower extremity 4/5 bilaterally  Neurovascularly intact  No grinding or clicking of joints       Lymphadenopathy:      Head:      Right side of head: No submental, submandibular, tonsillar, preauricular, posterior auricular or occipital adenopathy  Left side of head: No submental, submandibular, tonsillar, preauricular, posterior auricular or occipital adenopathy  Cervical: No cervical adenopathy  Right cervical: No superficial, deep or posterior cervical adenopathy  Left cervical: No superficial, deep or posterior cervical adenopathy  Skin:     General: Skin is warm  Capillary Refill: Capillary refill takes less than 2 seconds  Findings: Rash present  Rash is urticarial              Comments: Rash palms and soles sparing; no oropharyngeal lesions noted on exam;  Urticarial rash- macular blanchable erythematous lesion lower torso lower lumbar sacral area, upper anterior thigh region  No induration, streaking, skin intact  Neurological:      General: No focal deficit present  Mental Status: He is alert and oriented to person, place, and time  GCS: GCS eye subscore is 4  GCS verbal subscore is 5  GCS motor subscore is 6  Cranial Nerves: Cranial nerves are intact  Sensory: Sensation is intact  No sensory deficit  Motor: Motor function is intact  Coordination: Coordination is intact  Gait: Gait is intact     Psychiatric:         Mood and Affect: Mood normal          Speech: Speech normal  Behavior: Behavior normal  Behavior is cooperative  Thought Content: Thought content normal          Judgment: Judgment normal          Vital Signs  ED Triage Vitals [01/30/22 2013]   Temperature Pulse Respirations Blood Pressure SpO2   97 8 °F (36 6 °C) 84 20 (!) 219/92 97 %      Temp Source Heart Rate Source Patient Position - Orthostatic VS BP Location FiO2 (%)   Oral Monitor Sitting Right arm --      Pain Score       No Pain           Vitals:    01/30/22 2013 01/30/22 2103   BP: (!) 219/92 (!) 172/87   Pulse: 84 82   Patient Position - Orthostatic VS: Sitting Sitting         Visual Acuity      ED Medications  Medications   sodium chloride 0 9 % bolus 500 mL (0 mL Intravenous Stopped 1/30/22 2133)   diphenhydrAMINE (BENADRYL) injection 25 mg (25 mg Intravenous Given 1/30/22 2058)   famotidine (PEPCID) injection 20 mg (20 mg Intravenous Given 1/30/22 2056)   dexamethasone (DECADRON) injection 10 mg (10 mg Intravenous Given 1/30/22 2054)   cephalexin (KEFLEX) capsule 500 mg (500 mg Oral Given 1/30/22 2101)   tetanus-diphtheria-acellular pertussis (BOOSTRIX) IM injection 0 5 mL (0 5 mL Intramuscular Given 1/30/22 2048)       Diagnostic Studies  Results Reviewed     None                 No orders to display              Procedures  Procedures         ED Course  ED Course as of 01/30/22 2135   Regi Bigger Jan 30, 2022 2047 Abrasion to anterior right knee; sterile water irrigation, Betadine, surgical glue   2119 Patient rash improving; patient denies pruritic nature of rash                                               MDM  Number of Diagnoses or Management Options  Abrasion of right forearm, initial encounter: new and does not require workup  Abrasion, right knee, initial encounter: new and does not require workup  Rash and nonspecific skin eruption: new and does not require workup     Amount and/or Complexity of Data Reviewed  Review and summarize past medical records: yes    Risk of Complications, Morbidity, and/or Mortality  Presenting problems: low  Diagnostic procedures: low  Management options: low    Patient Progress  Patient progress: stable    Patient is a 49-year-old male with history of CKD, diabetes mellitus on insulin chronically, hyperlipidemia, hypertension, colonoscopy, the presents emergency department with generalized rash to torso, lower back and lower legs for 2 hours  Patient had no nausea vomiting, wheezing symptoms; doubt anaphylaxis  Patient hemodynamically stable and afebrile  Negative Nikolsky sign  Right knee abrasions; no bony tenderness, patient with self ambulation with no difficulty, no utility for imaging at this time for right knee  Delivered Decadron, Pepcid, and Benadryl; and patient verbalized decrease in ED presentation of rash symptomatology status post medication delivery  Upon serial reassessment, patient oropharynx was clear and patent with patient ED rash presentation decrease in erythema and inflammation  Patient ED rash presentation spreading progression arrested    Prescribed Benadryl and Pepcid and counseled patient medication administration and side effects  Prescribed Keflex and counseled patient on medication administration and side effects; patient with abrasion to left dorsal aspect of distal forearm; patient wife had reported that patient is continuously scratching he excoriation noted by myself; mild amount of erythema surrounding 1 cm x 1 cm excoriation, no induration, no bleeding drainage, no streaking, no heat; therefore prophylactic Keflex will be utilized; 500 b i d  5 days  Counseled patient on avoiding allergic triggers  Follow-up with allergist  Follow-up with PCP  Follow up with emergency department symptoms persist or exacerbate  Patient demonstrates verbal understanding of all discharge instructions, follow-up, and treatment plan    Disposition  Final diagnoses:   Rash and nonspecific skin eruption   Abrasion, right knee, initial encounter   Abrasion of right forearm, initial encounter     Time reflects when diagnosis was documented in both MDM as applicable and the Disposition within this note     Time User Action Codes Description Comment    1/30/2022  9:19 PM Johnella Solid Add [R21] Rash and nonspecific skin eruption     1/30/2022  9:19 PM Alpha  Abrasion, right knee, initial encounter     1/30/2022  9:35 PM Johnella Solid Add [S50 811A] Abrasion of right forearm, initial encounter       ED Disposition     ED Disposition Condition Date/Time Comment    Discharge Stable Sun Jan 30, 2022  9:19  [facility] discharge to home/self care  Follow-up Information     Follow up With Specialties Details Why Contact Info Additional [address],  Internal Medicine   [address]   [facility] [address]  [phone]       [facility] Emergency Department Emergency Medicine   [facility] [address] Emergency Department,  [address] [place] [address], [id number]    [facility]   [address]   [phone]             Discharge Medication List as of 1/30/2022  9:22 PM      START taking these medications    Details   cephalexin (KEFLEX) 500 mg capsule Take 1 capsule (500 mg total) by mouth every 12 (twelve) hours for 5 days, Starting Sun 1/30/2022, Until Fri 2/4/2022, Normal      diphenhydrAMINE (BENADRYL) 25 mg tablet Take 1 tablet (25 mg total) by mouth every 6 (six) hours as needed for itching for up to 2 days, Starting Sun 1/30/2022, Until Tue 2/1/2022 at 2359, Normal         CONTINUE these medications which have NOT CHANGED    Details   aspirin (ECOTRIN LOW STRENGTH) 81 mg EC tablet Take 81 mg by mouth daily Resume on 8/14  , Historical Med      atorvastatin (LIPITOR) 40 mg tablet Take 1 tablet (40 mg total) by mouth daily with dinner, Starting Mon 12/13/2021, Normal      BD Pen Needle Adina U/F 32G X 4 MM MISC USE TO INJECT INSULIN 4 TIMES DAILY, Normal      Blood Glucose Monitoring Suppl (True Metrix Meter) w/Device KIT Use to test blood sugars 3 times daily, Normal      Blood Pressure Monitoring (BLOOD PRESSURE CUFF) MISC Use to check blood pressure before taking blood pressure medication and 1 hour after and follow instructions provided in discharge instructions based on the readings  , Print      calcium acetate (CALPHRON) 667 mg TAKE 2 TABLETS BY MOUTH THREE TIMES DAILY WITH MEALS, Historical Med      carvedilol (COREG) 6 25 mg tablet Take 1 tablet (6 25 mg total) by mouth 2 (two) times a day with meals, Starting Tue 8/10/2021, Normal      Cholecalciferol (Vitamin D3) 1 25 MG (59769 UT) CAPS TAKE 1 CAPSULE BY MOUTH ONE TIME PER WEEK, Normal      Continuous Blood Gluc  (DEXCOM G6 ) LANCE Use as directed for continuous glucose monitoring, Normal      Continuous Blood Gluc Sensor (DEXCOM G6 SENSOR) MISC Use as directed for continuous glucose monitoring   Change every 10 days, Normal      Continuous Blood Gluc Transmit (DEXCOM G6 TRANSMITTER) MISC Use as directed for continuous glucose monitoring-Change every 3 months, Normal      doxercalciferol (HECTOROL) 0 5 mcg capsule 4 mcg, Starting Wed 11/10/2021, Until Wed 11/9/2022 at 2359, Historical Med      glucose blood (True Metrix Blood Glucose Test) test strip Use 1 each 3 (three) times a day Use as instructed, Starting Thu 2/25/2021, Normal      heparin 1000 units in sterile water 3000 mL irrigation bag Heparin Sodium (Porcine) 1,000 Units/mL Systemic, Historical Med      Incontinence Supplies (MALE URINAL) MISC by Does not apply route daily, Starting Mon 9/24/2018, Print      insulin lispro (HumaLOG KwikPen) 100 units/mL injection pen INJECT 4 UNITS 3 TIMES A DAY WITH MEALS PLUS SCALE, Normal      Insulin Syringe-Needle U-100 (B-D INS SYRINGE 0 5CC/30GX1/2") 30G X 1/2" 0 5 ML MISC Inject under the skin 4 (four) times a day, Starting Fri 6/5/2020, Normal      iron sucrose (VENOFER) 50 mg Once every 2 weeks, Starting Wed 7/28/2021, Until Wed 7/27/2022, Historical Med      Lantus 100 UNIT/ML subcutaneous injection INJECT 14 UNITS UNDER THE SKIN DAILY, Starting Wed 10/20/2021, Normal      Nutritional Supplements (VITAMIN D BOOSTER PO) Take 0 5 mcg by mouth, Starting Mon 6/21/2021, Until Mon 6/20/2022 at 2359, Historical Med      Cassandra Glenn STODDARD 85E MISC by Does not apply route 3 (three) times a day, Starting Tue 11/27/2018, Normal      TORSEMIDE PO 50 mg  , Starting Mon 10/25/2021, Historical Med             No discharge procedures on file      PDMP Review       Value Time User    PDMP Reviewed  Yes 12/30/2020 10:40 PM Erica Carmona MD          ED Provider  Electronically Signed by           Leander Floyd PA-C  01/30/22 1240

## 2022-01-31 NOTE — DISCHARGE INSTRUCTIONS
Take Benadryl as needed  Take Keflex as indicated  Follow-up with allergist  Follow-up with PCP  Follow up emergency department symptoms persist exacerbate  Avoid shrimp and shellfish consumption

## 2022-02-03 DIAGNOSIS — I10 ESSENTIAL HYPERTENSION: Chronic | ICD-10-CM

## 2022-02-03 RX ORDER — CARVEDILOL 6.25 MG/1
6.25 TABLET ORAL 2 TIMES DAILY WITH MEALS
Qty: 180 TABLET | Refills: 1 | Status: SHIPPED | OUTPATIENT
Start: 2022-02-03 | End: 2022-04-30 | Stop reason: HOSPADM

## 2022-02-08 ENCOUNTER — OFFICE VISIT (OUTPATIENT)
Dept: PODIATRY | Facility: CLINIC | Age: 62
End: 2022-02-08
Payer: MEDICARE

## 2022-02-08 ENCOUNTER — TELEPHONE (OUTPATIENT)
Dept: PSYCHIATRY | Facility: CLINIC | Age: 62
End: 2022-02-08

## 2022-02-08 VITALS
SYSTOLIC BLOOD PRESSURE: 174 MMHG | HEART RATE: 80 BPM | DIASTOLIC BLOOD PRESSURE: 82 MMHG | HEIGHT: 69 IN | WEIGHT: 236.4 LBS | BODY MASS INDEX: 35.01 KG/M2

## 2022-02-08 DIAGNOSIS — L85.3 XEROSIS OF SKIN: ICD-10-CM

## 2022-02-08 DIAGNOSIS — E11.42 DIABETIC POLYNEUROPATHY ASSOCIATED WITH TYPE 2 DIABETES MELLITUS (HCC): Primary | ICD-10-CM

## 2022-02-08 DIAGNOSIS — Z89.421 ABSENCE OF TOE OF RIGHT FOOT (HCC): ICD-10-CM

## 2022-02-08 PROCEDURE — 99213 OFFICE O/P EST LOW 20 MIN: CPT | Performed by: PODIATRIST

## 2022-02-08 RX ORDER — AMMONIUM LACTATE 12 G/100G
CREAM TOPICAL AS NEEDED
Qty: 385 G | Refills: 3 | Status: SHIPPED | OUTPATIENT
Start: 2022-02-08 | End: 2022-04-07 | Stop reason: HOSPADM

## 2022-02-08 NOTE — PROGRESS NOTES
PATIENT:  Joce Dixon    1960    ASSESSMENT:     1  Diabetic polyneuropathy associated with type 2 diabetes mellitus ([facility] [address] )  Diabetic Shoe    Diabetic Shoe Inserts   2  Xerosis of skin  ammonium lactate (LAC-HYDRIN) 12 % cream   3  Absence of toe of right foot ([facility] )  Diabetic Shoe    Diabetic Shoe Inserts         PLAN:  1  Patient was counseled and educated on the condition and the diagnosis  Educated disease prevention and risks related to diabetes  Educated proper daily foot care and exam   Instructed proper skin care / protection and footwear  2   The recent blood work was reviewed / discussed and the last HbA1c was 6 4  Discussed proper blood glucose control with diet and exercise  3  Rx: Ammonium lactate cream for dry skin and residual rashes  4  He has high risk diabetic foot with history of toe amputation  Referred him for new DM shoes and inserts  5  All nails debrided  RA in 10 weeks  Subjective:      HPI:   The patients presents for diabetic foot exam   He has high risk diabetic foot with history of ulcer and amputation  No ulcer in his feet  BS is under control  He has rashes from shrimp allergies  It is itching  He has been scratching  The following portions of the patient's history were reviewed and updated as appropriate: allergies, current medications, past family history, past medical history, past social history, past surgical history and problem list   All pertinent labs and images were reviewed      Past Medical History  Past Medical History:   Diagnosis Date    Cerebrovascular accident (CVA) due to thrombosis of left middle cerebral artery ([facility] [address] ) 7/29/2018    Chronic kidney disease     Diabetes mellitus ([facility] )     GERD (gastroesophageal reflux disease)     Hypercholesteremia     Hyperlipidemia     Hypertension     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    Neuropathy     Obesity     Osteomyelitis ([facility] )     last assessed 11/4/16    [facility] (premature ventricular contractions)     sees cardiology Dr Kenny camargo    Stroke [facility])     last weeof July 2018 [address]    TIA (transient ischemic attack) 10/28/2018       Past Surgical History  Past Surgical History:   Procedure Laterality Date    ABDOMINAL SURGERY      CHOLECYSTECTOMY      Percutaneous    COLONOSCOPY      CYSTOSCOPY      OTHER SURGICAL HISTORY      "stimulator to control bowel movements"    OR ESOPHAGOGASTRODUODENOSCOPY TRANSORAL DIAGNOSTIC N/A 9/27/2016    Procedure: ESOPHAGOGASTRODUODENOSCOPY (EGD); Surgeon: Wanda Riley MD;  [facility]: [facility]; Service: Gastroenterology    OR LAP,CHOLECYSTECTOMY N/A 2/29/2016    Procedure: LAPAROSCOPIC CHOLECYSTECTOMY ;  Surgeon: Kari Tejada DO;  Location[facility];  Service: General    ROTATOR CUFF REPAIR Right     TOE AMPUTATION Right 10/28/2016    Procedure: 3RD TOE AMPUTATION ;  Surgeon: Fanta Cueto DPM;  [facility];  Service:         Allergies:  Patient has no known allergies  Medications:  Current Outpatient Medications   Medication Sig Dispense Refill    aspirin (ECOTRIN LOW STRENGTH) 81 mg EC tablet Take 81 mg by mouth daily Resume on 8/14        atorvastatin (LIPITOR) 40 mg tablet Take 1 tablet (40 mg total) by mouth daily with dinner 90 tablet 1    BD Pen Needle Adina U/F 32G X 4 MM MISC USE TO INJECT INSULIN 4 TIMES DAILY 400 each 1    Blood Glucose Monitoring Suppl (True Metrix Meter) w/Device KIT Use to test blood sugars 3 times daily 1 kit 0    Blood Pressure Monitoring (BLOOD PRESSURE CUFF) MISC Use to check blood pressure before taking blood pressure medication and 1 hour after and follow instructions provided in discharge instructions based on the readings   1 each 0    calcium acetate (CALPHRON) 667 mg TAKE 2 TABLETS BY MOUTH THREE TIMES DAILY WITH MEALS      carvedilol (COREG) 6 25 mg tablet Take 1 tablet (6 25 mg total) by mouth 2 (two) times a day with meals Or as directed by your kidney doctor 180 tablet 1    Cholecalciferol (Vitamin D3) 1 25 MG (98464 UT) CAPS TAKE 1 CAPSULE BY MOUTH ONE TIME PER WEEK 12 capsule 2    Continuous Blood Gluc  (DEXCOM G6 ) LANCE Use as directed for continuous glucose monitoring 1 Device 0    Continuous Blood Gluc Sensor (DEXCOM G6 SENSOR) MISC Use as directed for continuous glucose monitoring  Change every 10 days 1 each 11    Continuous Blood Gluc Transmit (DEXCOM G6 TRANSMITTER) MISC Use as directed for continuous glucose monitoring-Change every 3 months 1 each 3    doxercalciferol (HECTOROL) 0 5 mcg capsule 4 mcg      glucose blood (True Metrix Blood Glucose Test) test strip Use 1 each 3 (three) times a day Use as instructed 100 each 5    heparin 1000 units in sterile water 3000 mL irrigation bag Heparin Sodium (Porcine) 1,000 Units/mL Systemic      Incontinence Supplies (MALE URINAL) MISC by Does not apply route daily 6 each 3    insulin lispro (HumaLOG KwikPen) 100 units/mL injection pen INJECT 4 UNITS 3 TIMES A DAY WITH MEALS PLUS SCALE 15 mL 1    Insulin Syringe-Needle U-100 (B-D INS SYRINGE 0 5CC/30GX1/2") 30G X 1/2" 0 5 ML MISC Inject under the skin 4 (four) times a day 360 each 1    Lantus 100 UNIT/ML subcutaneous injection INJECT 14 UNITS UNDER THE SKIN DAILY 10 mL 1    ONETOUCH DELICA LANCETS 17I MISC by Does not apply route 3 (three) times a day 270 each 1    TORSEMIDE PO 50 mg        ammonium lactate (LAC-HYDRIN) 12 % cream Apply topically as needed for dry skin 385 g 3    diphenhydrAMINE (BENADRYL) 25 mg tablet Take 1 tablet (25 mg total) by mouth every 6 (six) hours as needed for itching for up to 2 days 8 tablet 0    iron sucrose (VENOFER) 50 mg Once every 2 weeks (Patient not taking: Reported on 1/19/2022 )      Nutritional Supplements (VITAMIN D BOOSTER PO) Take 0 5 mcg by mouth (Patient not taking: Reported on 1/19/2022 )       No current facility-administered medications for this visit  Social History:  Social History     Socioeconomic History    Marital status: /Civil Union     Spouse name: None    Number of children: None    Years of education: None    Highest education level: Not asked   Occupational History    Occupation: disabled   Tobacco Use    Smoking status: Never Smoker    Smokeless tobacco: Never Used   Vaping Use    Vaping Use: Never used   Substance and Sexual Activity    Alcohol use: Not Currently    Drug use: No    Sexual activity: Not Currently   Other Topics Concern    None   Social History Narrative    Daily caffeine consumption 2-3 servings a day     Social Determinants of Health     Financial Resource Strain: Unknown    Difficulty of Paying Living Expenses: Patient refused   Food Insecurity: Unknown    Worried About Running Out of Food in the Last Year: Patient refused    Ran Out of Food in the Last Year: Patient refused   Transportation Needs: No Transportation Needs    Lack of Transportation (Medical): No    Lack of Transportation (Non-Medical): No   Physical Activity: Not on file   Stress: Not on file   Social Connections: Not on file   Intimate Partner Violence: Not on file   Housing Stability: Not on file        Review of Systems   Constitutional: Negative for chills and fever  Respiratory: Negative for cough and shortness of breath  Cardiovascular: Negative for chest pain  Gastrointestinal: Negative for constipation, diarrhea, nausea and vomiting  Neurological: Positive for numbness  Objective:      BP (!) 174/82   Pulse 80   Ht 5' 9" (1 753 m) Comment: verbal  Wt 107 kg (236 lb 6 4 oz)   BMI 34 91 kg/m²          Physical Exam  Vitals reviewed  Constitutional:       General: He is not in acute distress  Appearance: He is well-developed  He is obese  He is not toxic-appearing or diaphoretic  Cardiovascular:      Rate and Rhythm: Normal rate and regular rhythm        Pulses:           Dorsalis pedis pulses are 1+ on the right side and 1+ on the left side  Posterior tibial pulses are 0 on the right side and 0 on the left side  Pulmonary:      Effort: Pulmonary effort is normal  No respiratory distress  Musculoskeletal:         General: Deformity present  No signs of injury  Right lower leg: No edema  Left lower leg: No edema  Right foot: No Charcot foot or foot drop  Left foot: No Charcot foot or foot drop  Comments: Hammertoe deformity noted  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  Mild edema and redness right foot  Mild warmth  Feet:      Right foot:      Protective Sensation: 10 sites tested  0 sites sensed  Skin integrity: Dry skin present  No ulcer, skin breakdown, erythema, warmth or callus  Left foot:      Protective Sensation: 10 sites tested  0 sites sensed  Skin integrity: Dry skin present  No ulcer, skin breakdown, erythema, warmth or callus  Skin:     General: Skin is warm  Capillary Refill: Capillary refill takes less than 2 seconds  Coloration: Skin is not cyanotic or mottled  Findings: No abscess, ecchymosis, erythema or wound  Rash is not purpuric  Nails: There is no clubbing  Comments: Thick mycotic nails X 7 with discoloration and onycholysis  Dryness / excoriation in his skin BLE  Neurological:      General: No focal deficit present  Mental Status: He is alert and oriented to person, place, and time  Cranial Nerves: No cranial nerve deficit  Sensory: Sensory deficit present  Motor: No weakness  Coordination: Coordination normal    Psychiatric:         Mood and Affect: Mood normal          Behavior: Behavior normal          Thought Content:  Thought content normal          Judgment: Judgment normal

## 2022-02-08 NOTE — TELEPHONE ENCOUNTER
reached out to pt with the help of the [facility]   We will try to reach out again at  a later date

## 2022-02-18 ENCOUNTER — TELEPHONE (OUTPATIENT)
Dept: PODIATRY | Facility: CLINIC | Age: 62
End: 2022-02-18

## 2022-02-23 ENCOUNTER — TELEPHONE (OUTPATIENT)
Dept: ENDOCRINOLOGY | Facility: CLINIC | Age: 62
End: 2022-02-23

## 2022-03-10 ENCOUNTER — TELEPHONE (OUTPATIENT)
Dept: OBGYN CLINIC | Facility: HOSPITAL | Age: 62
End: 2022-03-10

## 2022-03-10 NOTE — TELEPHONE ENCOUNTER
Patient sees Dr Aguila Kind  Patients wife Klever Delgado is calling in wanting to get the diabetic shoe inserts along with the diabetic shoe script faxed over to [facility] [address] [facility] at [phone]  Please fax as soon as possible since patient is currently with them now and forgot to bring it along

## 2022-03-14 ENCOUNTER — TELEPHONE (OUTPATIENT)
Dept: INTERNAL MEDICINE CLINIC | Facility: CLINIC | Age: 62
End: 2022-03-14

## 2022-03-14 NOTE — TELEPHONE ENCOUNTER
7/20/21 11:39 AM     Lidocaine Injection      NDC: 8628-2507-74    Lot Number: [id number]    Body Part: Right AC joint      7/20/21 11:39 AM    Ropivacaine Injection      NDC: 28443-751-01    Lot Number: [id number]    Body Part: Right AC joint chantel called and said starr has shoulder pain since last night  Pain is 8/10   no fall or injury and no jaw pain  He does have a cough but she said they did a home test and it was (-)  She is asking if you can see him in the office later today  She is going to see if his dialysis can be a little earlier today if you can see him end of day?

## 2022-03-14 NOTE — TELEPHONE ENCOUNTER
The pain is in the right shoulder  He has pain when at rest and when he moves it as well  He does not have any chest pain at all  She is still trying to reach dialysis

## 2022-03-14 NOTE — TELEPHONE ENCOUNTER
Which shoulder is hurting? Does his shoulder hurt when he moves it or it hurts at rest as well? Does he have any chest pains at rest or with exertion?     Let me know, thank you

## 2022-03-14 NOTE — TELEPHONE ENCOUNTER
Eleno London spoke with Abiodun Keene and Steve Green was scheduled for tomorrow morning  I asked her again and he has no chest pain or shortness of breath  She thinks maybe he slept on his wrong

## 2022-03-14 NOTE — TELEPHONE ENCOUNTER
We have a 3:30pm opening but Rona tSyles will have to be here by 3:15pm    Did he injure his shoulder?   If no and he has chest pain or shortness of breath with shoulder pain, it could be a sign of heart problem which would necessitate ER eval

## 2022-03-15 ENCOUNTER — OFFICE VISIT (OUTPATIENT)
Dept: INTERNAL MEDICINE CLINIC | Facility: CLINIC | Age: 62
End: 2022-03-15
Payer: MEDICARE

## 2022-03-15 VITALS
BODY MASS INDEX: 35.52 KG/M2 | HEART RATE: 68 BPM | RESPIRATION RATE: 15 BRPM | DIASTOLIC BLOOD PRESSURE: 78 MMHG | SYSTOLIC BLOOD PRESSURE: 132 MMHG | TEMPERATURE: 98.2 F | WEIGHT: 239.8 LBS | HEIGHT: 69 IN | OXYGEN SATURATION: 98 %

## 2022-03-15 DIAGNOSIS — M25.511 ACUTE PAIN OF RIGHT SHOULDER: ICD-10-CM

## 2022-03-15 DIAGNOSIS — J06.9 UPPER RESPIRATORY TRACT INFECTION, UNSPECIFIED TYPE: Primary | ICD-10-CM

## 2022-03-15 DIAGNOSIS — Z99.2 ESRD ON DIALYSIS (HCC): ICD-10-CM

## 2022-03-15 DIAGNOSIS — F41.8 ANXIETY ASSOCIATED WITH DEPRESSION: ICD-10-CM

## 2022-03-15 DIAGNOSIS — N18.6 ESRD ON DIALYSIS (HCC): ICD-10-CM

## 2022-03-15 DIAGNOSIS — D61.818 PANCYTOPENIA (HCC): ICD-10-CM

## 2022-03-15 PROBLEM — I12.0 BENIGN HYPERTENSION WITH CHRONIC KIDNEY DISEASE, STAGE V (HCC): Status: RESOLVED | Noted: 2019-07-09 | Resolved: 2022-03-15

## 2022-03-15 PROBLEM — N18.5 BENIGN HYPERTENSION WITH CHRONIC KIDNEY DISEASE, STAGE V (HCC): Status: RESOLVED | Noted: 2019-07-09 | Resolved: 2022-03-15

## 2022-03-15 PROCEDURE — 99214 OFFICE O/P EST MOD 30 MIN: CPT | Performed by: INTERNAL MEDICINE

## 2022-03-15 RX ORDER — DOXYCYCLINE HYCLATE 100 MG/1
100 CAPSULE ORAL EVERY 12 HOURS SCHEDULED
Qty: 14 CAPSULE | Refills: 0 | Status: SHIPPED | OUTPATIENT
Start: 2022-03-15 | End: 2022-05-26 | Stop reason: SDUPTHER

## 2022-03-15 NOTE — Clinical Note
Please call Dr Harley Campos office([facility]) to see if Dr Denny Roblero is okay with Reuben Mireles taking sertraline 25-50mg once a day  As far as I can tell, sertraline is not cleared thru the kidneys    Thanks!

## 2022-03-15 NOTE — PATIENT INSTRUCTIONS
1  Doxycycline antibiotic  2  Consider sertraline 25-50mg once a day for anxiety if Dr Ronald Worthington is okay with this  3  Shoulder x-ray  4   Physical therapy

## 2022-03-15 NOTE — PROGRESS NOTES
Assessment/Plan:     Diagnoses and all orders for this visit:    Upper respiratory tract infection, unspecified type  Comments:  will treat with doxycycline(confirmed with pharmacist that no dose adjustment is needed with patient being on dialysis)  precautions advised  Orders:  -     doxycycline hyclate (VIBRAMYCIN) 100 mg capsule; Take 1 capsule (100 mg total) by mouth every 12 (twelve) hours for 7 days    Anxiety associated with depression  Comments:  t/c sertraline which is not renally cleared per [facility]  Called pt's nephrologist(Dr KIMBERLY gant) office and LM for call back  Acute pain of right shoulder  Comments:  doubt rotator cuff tear given decent ROM of right arm/shoulder on physical exam(had minimal pain)  x-ray and PT eval advised  Orders:  -     XR shoulder 2+ vw right; Future  -     Ambulatory Referral to Physical Therapy; Future    Pancytopenia ([facility] [address] )  Comments:  noted on BW back in 2020, likely multifactorial in cause but stable  t/c hematology eval    ESRD on dialysis [facility])  Comments:  goes M/W/F for hemodialysis      doxycycline prescription d/w pharmacist at [address] [facility] just after 5pm who advised no dose adjustment is necessary for doxycycline even with patient on dialysis  Subjective:      Patient ID: Marianne Page is a 58 y o  male  HPI    Here for follow up, here with wife during today's appt who provides add'l history  Amanda Trujillo woke up 2 days ago with right shoulder pain  No fall/injury and pain occurs with movement of arm  History of right rotator cuff surgery/repair in past   No pain elsewhere but he is having a cough and congestion/URI symptoms for the last 2 weeks  Their son has been ill recently with a similar URI  No fever, chills, SOB or CP  Amanda Trujillo is having stress/anxiety and his SSRI was stopped by his kidney dr due to Amanda Trujillo being on Dialysis(ESRD)  He is a pre-transplant candidate but has to lose more weight    ROS Otherwise negative, no other complaints  Past Medical History:   Diagnosis Date    Cerebrovascular accident (CVA) due to thrombosis of left middle cerebral artery ([facility] ) 7/29/2018    Chronic kidney disease     Diabetes mellitus ([facility] )     GERD (gastroesophageal reflux disease)     Hypercholesteremia     Hyperlipidemia     Hypertension     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    Neuropathy     Obesity     [facility]     last assessed 11/4/16    PVC's (premature ventricular contractions)     sees cardiology Dr Ivet camargo    Stroke [facility])     last weeof July 2018 [address] [facility]    TIA (transient ischemic attack) 10/28/2018     Vitals:    03/15/22 1639   BP: 132/78   Pulse: 68   Resp: 15   Temp: 98 2 °F (36 8 °C)   SpO2: 98%   Weight: 109 kg (239 lb 12 8 oz)   Height: 5' 9" (1 753 m)     Body mass index is 35 41 kg/m²  Current Outpatient Medications:     ammonium lactate (LAC-HYDRIN) 12 % cream, Apply topically as needed for dry skin, Disp: 385 g, Rfl: 3    aspirin (ECOTRIN LOW STRENGTH) 81 mg EC tablet, Take 81 mg by mouth daily Resume on 8/14  , Disp: , Rfl:     atorvastatin (LIPITOR) 40 mg tablet, Take 1 tablet (40 mg total) by mouth daily with dinner, Disp: 90 tablet, Rfl: 1    BD Pen Needle Adina U/F 32G X 4 MM MISC, USE TO INJECT INSULIN 4 TIMES DAILY, Disp: 400 each, Rfl: 1    Blood Glucose Monitoring Suppl (True Metrix Meter) w/Device KIT, Use to test blood sugars 3 times daily, Disp: 1 kit, Rfl: 0    Blood Pressure Monitoring (BLOOD PRESSURE CUFF) MISC, Use to check blood pressure before taking blood pressure medication and 1 hour after and follow instructions provided in discharge instructions based on the readings  , Disp: 1 each, Rfl: 0    calcium acetate (CALPHRON) 667 mg, TAKE 2 TABLETS BY MOUTH THREE TIMES DAILY WITH MEALS, Disp: , Rfl:     carvedilol (COREG) 6 25 mg tablet, Take 1 tablet (6 25 mg total) by mouth 2 (two) times a day with meals Or as directed by your kidney doctor, Disp: 180 tablet, Rfl: 1    Cholecalciferol (Vitamin D3) 1 25 MG (35570 UT) CAPS, TAKE 1 CAPSULE BY MOUTH ONE TIME PER WEEK, Disp: 12 capsule, Rfl: 2    Continuous Blood Gluc  (DEXCOM G6 ) LANCE, Use as directed for continuous glucose monitoring, Disp: 1 Device, Rfl: 0    Continuous Blood Gluc Sensor (DEXCOM G6 SENSOR) MISC, Use as directed for continuous glucose monitoring   Change every 10 days, Disp: 1 each, Rfl: 11    Continuous Blood Gluc Transmit (DEXCOM G6 TRANSMITTER) MISC, Use as directed for continuous glucose monitoring-Change every 3 months, Disp: 1 each, Rfl: 3    doxercalciferol (HECTOROL) 0 5 mcg capsule, 4 mcg, Disp: , Rfl:     glucose blood (True Metrix Blood Glucose Test) test strip, Use 1 each 3 (three) times a day Use as instructed, Disp: 100 each, Rfl: 5    heparin 1000 units in sterile water 3000 mL irrigation bag, Heparin Sodium (Porcine) 1,000 Units/mL Systemic, Disp: , Rfl:     Incontinence Supplies (MALE URINAL) MISC, by Does not apply route daily, Disp: 6 each, Rfl: 3    insulin lispro (HumaLOG KwikPen) 100 units/mL injection pen, INJECT 4 UNITS 3 TIMES A DAY WITH MEALS PLUS SCALE, Disp: 15 mL, Rfl: 1    Insulin Syringe-Needle U-100 (B-D INS SYRINGE 0 5CC/30GX1/2") 30G X 1/2" 0 5 ML MISC, Inject under the skin 4 (four) times a day, Disp: 360 each, Rfl: 1    Lantus 100 UNIT/ML subcutaneous injection, INJECT 14 UNITS UNDER THE SKIN DAILY, Disp: 10 mL, Rfl: 1    Nutritional Supplements (VITAMIN D BOOSTER PO), Take 0 5 mcg by mouth  , Disp: , Rfl:     ONETOUCH DELICA LANCETS 53Z MISC, by Does not apply route 3 (three) times a day, Disp: 270 each, Rfl: 1    TORSEMIDE PO, 50 mg  , Disp: , Rfl:     diphenhydrAMINE (BENADRYL) 25 mg tablet, Take 1 tablet (25 mg total) by mouth every 6 (six) hours as needed for itching for up to 2 days, Disp: 8 tablet, Rfl: 0    doxycycline hyclate (VIBRAMYCIN) 100 mg capsule, Take 1 capsule (100 mg total) by mouth every 12 (twelve) hours for 7 days, Disp: 14 capsule, Rfl: 0    iron sucrose (VENOFER), 50 mg Once every 2 weeks (Patient not taking: Reported on 1/19/2022 ), Disp: , Rfl:   No Known Allergies      Review of Systems   Constitutional: Negative for fever  HENT: Positive for congestion and postnasal drip  Eyes: Negative for visual disturbance  Respiratory: Positive for cough  Negative for shortness of breath  Cardiovascular: Negative for chest pain  Gastrointestinal: Negative for abdominal pain  Endocrine: Negative for polyuria  Genitourinary: Negative for difficulty urinating  Musculoskeletal: Positive for arthralgias (right shoulder)  Skin: Negative for rash  Allergic/Immunologic: Negative for immunocompromised state  Neurological: Negative for dizziness  Psychiatric/Behavioral: Positive for dysphoric mood  Negative for suicidal ideas  The patient is nervous/anxious  Objective:      /78   Pulse 68   Temp 98 2 °F (36 8 °C)   Resp 15   Ht 5' 9" (1 753 m)   Wt 109 kg (239 lb 12 8 oz)   SpO2 98%   BMI 35 41 kg/m²          Physical Exam  Vitals reviewed  Constitutional:       Appearance: Normal appearance  He is obese  HENT:      Head: Normocephalic and atraumatic  Right Ear: Tympanic membrane normal       Left Ear: Tympanic membrane normal       Nose: Congestion and rhinorrhea present  Right Turbinates: Enlarged  Left Turbinates: Enlarged  Right Sinus: No maxillary sinus tenderness or frontal sinus tenderness  Left Sinus: No maxillary sinus tenderness or frontal sinus tenderness  Eyes:      Conjunctiva/sclera: Conjunctivae normal    Cardiovascular:      Rate and Rhythm: Normal rate and regular rhythm  Heart sounds: No murmur heard  Pulmonary:      Effort: Pulmonary effort is normal       Breath sounds: No wheezing or rales  Abdominal:      General: Bowel sounds are normal       Palpations: Abdomen is soft  Tenderness:  There is no abdominal tenderness  Musculoskeletal:      Right shoulder: No swelling, tenderness or bony tenderness (no AC joint tenderness)  Decreased range of motion (mild changes related to intermittent pain during ROM testing of arm)  Right lower leg: No edema  Left lower leg: No edema  Neurological:      Mental Status: He is alert  Mental status is at baseline  Psychiatric:         Mood and Affect: Mood is anxious           Behavior: Behavior normal

## 2022-03-16 ENCOUNTER — HOSPITAL ENCOUNTER (OUTPATIENT)
Dept: RADIOLOGY | Facility: HOSPITAL | Age: 62
Discharge: HOME | End: 2022-03-16
Payer: MEDICARE

## 2022-03-16 DIAGNOSIS — M25.511 ACUTE PAIN OF RIGHT SHOULDER: ICD-10-CM

## 2022-03-16 PROCEDURE — 73030 X-RAY EXAM OF SHOULDER: CPT

## 2022-03-17 ENCOUNTER — TELEPHONE (OUTPATIENT)
Dept: INTERNAL MEDICINE CLINIC | Facility: CLINIC | Age: 62
End: 2022-03-17

## 2022-03-17 DIAGNOSIS — F41.8 ANXIETY ASSOCIATED WITH DEPRESSION: Primary | ICD-10-CM

## 2022-03-17 NOTE — TELEPHONE ENCOUNTER
----- Message from Carmelita Garcia DO sent at 3/15/2022  7:17 PM EDT -----  Please call Dr Blanco House office([facility]) to see if Dr Salazar Hutton is okay with Toshia Shoulder taking sertraline 25-50mg once a day  As far as I can tell, sertraline is not cleared thru the kidneysThanks!

## 2022-03-17 NOTE — TELEPHONE ENCOUNTER
Please call pt's wife to let her know, Dr Helene Moritz is okay with Maninder Leo taking sertraline for anxiety/depression    It is similar to lexapro he took before, so take at bedtime and recommend to start lowest dose 25-50mg at bedtime

## 2022-03-17 NOTE — TELEPHONE ENCOUNTER
Called and spoke to pt wife  They agree with taking the medications and can you send it to the pharmacy on file

## 2022-03-21 ENCOUNTER — TELEPHONE (OUTPATIENT)
Dept: INTERNAL MEDICINE CLINIC | Facility: CLINIC | Age: 62
End: 2022-03-21

## 2022-03-21 NOTE — TELEPHONE ENCOUNTER
----- Message from Harpreet Kramer DO sent at 3/21/2022  8:04 AM EDT -----  (please call wife, Clelisa Like): Shoulder x-ray is back and shows mild osteoarthritis, but otherwise looks normal   Is edward feeling better from shoulder pain? Did the antibiotic help his infection?

## 2022-03-21 NOTE — TELEPHONE ENCOUNTER
Called and left message about message  Told them to call back with how the shoulder is doing and if the antibodies are working

## 2022-03-24 DIAGNOSIS — E11.22 TYPE 2 DIABETES MELLITUS WITH CHRONIC KIDNEY DISEASE ON CHRONIC DIALYSIS, WITH LONG-TERM CURRENT USE OF INSULIN (HCC): Primary | ICD-10-CM

## 2022-03-24 DIAGNOSIS — Z99.2 TYPE 2 DIABETES MELLITUS WITH CHRONIC KIDNEY DISEASE ON CHRONIC DIALYSIS, WITH LONG-TERM CURRENT USE OF INSULIN (HCC): Primary | ICD-10-CM

## 2022-03-24 DIAGNOSIS — N18.6 TYPE 2 DIABETES MELLITUS WITH CHRONIC KIDNEY DISEASE ON CHRONIC DIALYSIS, WITH LONG-TERM CURRENT USE OF INSULIN (HCC): Primary | ICD-10-CM

## 2022-03-24 DIAGNOSIS — Z79.4 TYPE 2 DIABETES MELLITUS WITH CHRONIC KIDNEY DISEASE ON CHRONIC DIALYSIS, WITH LONG-TERM CURRENT USE OF INSULIN (HCC): Primary | ICD-10-CM

## 2022-03-24 NOTE — TELEPHONE ENCOUNTER
pts meter cricket  Can u order him a trumetrix meter    Also needs the strips  Using 4 daily    [facility]

## 2022-03-28 DIAGNOSIS — Z79.4 TYPE 2 DIABETES MELLITUS WITH HYPERGLYCEMIA, WITH LONG-TERM CURRENT USE OF INSULIN (HCC): ICD-10-CM

## 2022-03-28 DIAGNOSIS — Z79.4 TYPE 2 DIABETES MELLITUS WITH CHRONIC KIDNEY DISEASE ON CHRONIC DIALYSIS, WITH LONG-TERM CURRENT USE OF INSULIN (HCC): ICD-10-CM

## 2022-03-28 DIAGNOSIS — N18.6 TYPE 2 DIABETES MELLITUS WITH CHRONIC KIDNEY DISEASE ON CHRONIC DIALYSIS, WITH LONG-TERM CURRENT USE OF INSULIN (HCC): ICD-10-CM

## 2022-03-28 DIAGNOSIS — E11.65 TYPE 2 DIABETES MELLITUS WITH HYPERGLYCEMIA, WITH LONG-TERM CURRENT USE OF INSULIN (HCC): ICD-10-CM

## 2022-03-28 DIAGNOSIS — Z99.2 TYPE 2 DIABETES MELLITUS WITH CHRONIC KIDNEY DISEASE ON CHRONIC DIALYSIS, WITH LONG-TERM CURRENT USE OF INSULIN (HCC): ICD-10-CM

## 2022-03-28 DIAGNOSIS — E11.22 TYPE 2 DIABETES MELLITUS WITH CHRONIC KIDNEY DISEASE ON CHRONIC DIALYSIS, WITH LONG-TERM CURRENT USE OF INSULIN (HCC): ICD-10-CM

## 2022-03-28 RX ORDER — CALCIUM CITRATE/VITAMIN D3 200MG-6.25
TABLET ORAL
Qty: 400 STRIP | Refills: 1 | Status: SHIPPED | OUTPATIENT
Start: 2022-03-28 | End: 2022-03-28

## 2022-03-28 RX ORDER — LANCETS 30 GAUGE
EACH MISCELLANEOUS
Qty: 400 EACH | Refills: 1 | Status: SHIPPED | OUTPATIENT
Start: 2022-03-28 | End: 2022-03-28 | Stop reason: SDUPTHER

## 2022-03-28 RX ORDER — CALCIUM CITRATE/VITAMIN D3 200MG-6.25
1 TABLET ORAL 3 TIMES DAILY
Qty: 100 EACH | Refills: 0 | Status: SHIPPED | OUTPATIENT
Start: 2022-03-28 | End: 2022-04-12 | Stop reason: SDUPTHER

## 2022-03-28 RX ORDER — LANCETS 30 GAUGE
EACH MISCELLANEOUS
Qty: 300 EACH | Refills: 0 | Status: SHIPPED | OUTPATIENT
Start: 2022-03-28

## 2022-03-28 RX ORDER — CALCIUM CITRATE/VITAMIN D3 200MG-6.25
TABLET ORAL
Qty: 300 STRIP | Refills: 1 | Status: SHIPPED | OUTPATIENT
Start: 2022-03-28 | End: 2022-03-28

## 2022-03-29 ENCOUNTER — TELEPHONE (OUTPATIENT)
Dept: NEUROLOGY | Facility: CLINIC | Age: 62
End: 2022-03-29

## 2022-03-29 NOTE — TELEPHONE ENCOUNTER
Called and spoke with the patients wife to reschedule the patients appt for 4/27/22 with Dr Ana Holder because he will be working inpatient and informed the patients wife and offered a sooner appt  Wife stated she does not want the patient to see any other providers and she was willing to wait for the first available appointment in September

## 2022-04-01 DIAGNOSIS — Z79.4 TYPE 2 DIABETES MELLITUS WITH HYPERGLYCEMIA, WITH LONG-TERM CURRENT USE OF INSULIN (HCC): ICD-10-CM

## 2022-04-01 DIAGNOSIS — E11.65 TYPE 2 DIABETES MELLITUS WITH HYPERGLYCEMIA, WITH LONG-TERM CURRENT USE OF INSULIN (HCC): ICD-10-CM

## 2022-04-01 RX ORDER — INSULIN LISPRO 100 [IU]/ML
INJECTION, SOLUTION INTRAVENOUS; SUBCUTANEOUS
Qty: 15 ML | Refills: 1 | Status: SHIPPED | OUTPATIENT
Start: 2022-04-01 | End: 2022-04-04 | Stop reason: SDUPTHER

## 2022-04-04 DIAGNOSIS — E11.65 TYPE 2 DIABETES MELLITUS WITH HYPERGLYCEMIA, WITH LONG-TERM CURRENT USE OF INSULIN (HCC): ICD-10-CM

## 2022-04-04 DIAGNOSIS — Z79.4 TYPE 2 DIABETES MELLITUS WITH HYPERGLYCEMIA, WITH LONG-TERM CURRENT USE OF INSULIN (HCC): ICD-10-CM

## 2022-04-04 NOTE — TELEPHONE ENCOUNTER
Patient has no medication left and needs medication called into [facility]   insulin lispro (HumaLOG KwikPen) 100 units/ml injection pen

## 2022-04-05 RX ORDER — INSULIN LISPRO 100 [IU]/ML
INJECTION, SOLUTION INTRAVENOUS; SUBCUTANEOUS
Qty: 45 ML | Refills: 1 | Status: ON HOLD | OUTPATIENT
Start: 2022-04-05 | End: 2022-04-07 | Stop reason: SDUPTHER

## 2022-04-06 ENCOUNTER — HOSPITAL ENCOUNTER (OUTPATIENT)
Facility: HOSPITAL | Age: 62
Setting detail: OBSERVATION
Discharge: HOME | End: 2022-04-07
Attending: EMERGENCY MEDICINE | Admitting: INTERNAL MEDICINE
Payer: MEDICARE

## 2022-04-06 ENCOUNTER — APPOINTMENT (EMERGENCY)
Dept: CT IMAGING | Facility: HOSPITAL | Age: 62
End: 2022-04-06
Payer: MEDICARE

## 2022-04-06 DIAGNOSIS — Z79.4 TYPE 2 DIABETES MELLITUS WITH HYPERGLYCEMIA, WITH LONG-TERM CURRENT USE OF INSULIN (HCC): ICD-10-CM

## 2022-04-06 DIAGNOSIS — R77.8 ELEVATED TROPONIN: ICD-10-CM

## 2022-04-06 DIAGNOSIS — R42 DIZZINESS: Primary | ICD-10-CM

## 2022-04-06 DIAGNOSIS — R94.31 ABNORMAL EKG: ICD-10-CM

## 2022-04-06 DIAGNOSIS — E11.65 TYPE 2 DIABETES MELLITUS WITH HYPERGLYCEMIA, WITH LONG-TERM CURRENT USE OF INSULIN (HCC): ICD-10-CM

## 2022-04-06 PROBLEM — R79.89 ELEVATED TROPONIN: Status: ACTIVE | Noted: 2022-04-06

## 2022-04-06 PROBLEM — I10 HYPERTENSION: Status: ACTIVE | Noted: 2022-04-06

## 2022-04-06 LAB
2HR DELTA HS TROPONIN: 0 NG/L
ALBUMIN SERPL BCP-MCNC: 3.6 G/DL (ref 3.5–5)
ALP SERPL-CCNC: 89 U/L (ref 46–116)
ALT SERPL W P-5'-P-CCNC: 22 U/L (ref 12–78)
ANION GAP SERPL CALCULATED.3IONS-SCNC: 9 MMOL/L (ref 4–13)
APTT PPP: 30 SECONDS (ref 23–37)
AST SERPL W P-5'-P-CCNC: 14 U/L (ref 5–45)
BASOPHILS # BLD AUTO: 0.02 THOUSANDS/ΜL (ref 0–0.1)
BASOPHILS NFR BLD AUTO: 0 % (ref 0–1)
BILIRUB SERPL-MCNC: 0.45 MG/DL (ref 0.2–1)
BUN SERPL-MCNC: 37 MG/DL (ref 5–25)
CALCIUM SERPL-MCNC: 8.8 MG/DL (ref 8.3–10.1)
CARDIAC TROPONIN I PNL SERPL HS: 62 NG/L
CARDIAC TROPONIN I PNL SERPL HS: 62 NG/L
CHLORIDE SERPL-SCNC: 96 MMOL/L (ref 100–108)
CO2 SERPL-SCNC: 34 MMOL/L (ref 21–32)
CREAT SERPL-MCNC: 5.79 MG/DL (ref 0.6–1.3)
EOSINOPHIL # BLD AUTO: 0.08 THOUSAND/ΜL (ref 0–0.61)
EOSINOPHIL NFR BLD AUTO: 1 % (ref 0–6)
ERYTHROCYTE [DISTWIDTH] IN BLOOD BY AUTOMATED COUNT: 14.4 % (ref 11.6–15.1)
GFR SERPL CREATININE-BSD FRML MDRD: 9 ML/MIN/1.73SQ M
GLUCOSE SERPL-MCNC: 245 MG/DL (ref 65–140)
GLUCOSE SERPL-MCNC: 246 MG/DL (ref 65–140)
GLUCOSE SERPL-MCNC: 252 MG/DL (ref 65–140)
HCT VFR BLD AUTO: 35 % (ref 36.5–49.3)
HGB BLD-MCNC: 11.3 G/DL (ref 12–17)
IMM GRANULOCYTES # BLD AUTO: 0.05 THOUSAND/UL (ref 0–0.2)
IMM GRANULOCYTES NFR BLD AUTO: 1 % (ref 0–2)
INR PPP: 1.01 (ref 0.84–1.19)
LYMPHOCYTES # BLD AUTO: 1.1 THOUSANDS/ΜL (ref 0.6–4.47)
LYMPHOCYTES NFR BLD AUTO: 19 % (ref 14–44)
MAGNESIUM SERPL-MCNC: 2 MG/DL (ref 1.6–2.6)
MCH RBC QN AUTO: 30.5 PG (ref 26.8–34.3)
MCHC RBC AUTO-ENTMCNC: 32.3 G/DL (ref 31.4–37.4)
MCV RBC AUTO: 95 FL (ref 82–98)
MONOCYTES # BLD AUTO: 0.53 THOUSAND/ΜL (ref 0.17–1.22)
MONOCYTES NFR BLD AUTO: 9 % (ref 4–12)
NEUTROPHILS # BLD AUTO: 4.04 THOUSANDS/ΜL (ref 1.85–7.62)
NEUTS SEG NFR BLD AUTO: 70 % (ref 43–75)
NRBC BLD AUTO-RTO: 0 /100 WBCS
PLATELET # BLD AUTO: 152 THOUSANDS/UL (ref 149–390)
PMV BLD AUTO: 10.9 FL (ref 8.9–12.7)
POTASSIUM SERPL-SCNC: 4.4 MMOL/L (ref 3.5–5.3)
PROT SERPL-MCNC: 7.6 G/DL (ref 6.4–8.2)
PROTHROMBIN TIME: 13.3 SECONDS (ref 11.6–14.5)
RBC # BLD AUTO: 3.7 MILLION/UL (ref 3.88–5.62)
SODIUM SERPL-SCNC: 139 MMOL/L (ref 136–145)
TSH SERPL DL<=0.05 MIU/L-ACNC: 1.28 UIU/ML (ref 0.45–4.5)
WBC # BLD AUTO: 5.82 THOUSAND/UL (ref 4.31–10.16)

## 2022-04-06 PROCEDURE — 84484 ASSAY OF TROPONIN QUANT: CPT | Performed by: INTERNAL MEDICINE

## 2022-04-06 PROCEDURE — 70450 CT HEAD/BRAIN W/O DYE: CPT

## 2022-04-06 PROCEDURE — 84443 ASSAY THYROID STIM HORMONE: CPT | Performed by: EMERGENCY MEDICINE

## 2022-04-06 PROCEDURE — 82948 REAGENT STRIP/BLOOD GLUCOSE: CPT

## 2022-04-06 PROCEDURE — 93005 ELECTROCARDIOGRAM TRACING: CPT

## 2022-04-06 PROCEDURE — 80053 COMPREHEN METABOLIC PANEL: CPT | Performed by: EMERGENCY MEDICINE

## 2022-04-06 PROCEDURE — 99285 EMERGENCY DEPT VISIT HI MDM: CPT

## 2022-04-06 PROCEDURE — 36415 COLL VENOUS BLD VENIPUNCTURE: CPT | Performed by: EMERGENCY MEDICINE

## 2022-04-06 PROCEDURE — 83735 ASSAY OF MAGNESIUM: CPT | Performed by: EMERGENCY MEDICINE

## 2022-04-06 PROCEDURE — 85730 THROMBOPLASTIN TIME PARTIAL: CPT | Performed by: EMERGENCY MEDICINE

## 2022-04-06 PROCEDURE — 85610 PROTHROMBIN TIME: CPT | Performed by: EMERGENCY MEDICINE

## 2022-04-06 PROCEDURE — 84484 ASSAY OF TROPONIN QUANT: CPT | Performed by: EMERGENCY MEDICINE

## 2022-04-06 PROCEDURE — 85025 COMPLETE CBC W/AUTO DIFF WBC: CPT | Performed by: EMERGENCY MEDICINE

## 2022-04-06 PROCEDURE — 99284 EMERGENCY DEPT VISIT MOD MDM: CPT | Performed by: EMERGENCY MEDICINE

## 2022-04-06 RX ORDER — CALCIUM CARBONATE 200(500)MG
500 TABLET,CHEWABLE ORAL DAILY PRN
Status: DISCONTINUED | OUTPATIENT
Start: 2022-04-06 | End: 2022-04-07 | Stop reason: HOSPADM

## 2022-04-06 RX ORDER — ASPIRIN 325 MG
325 TABLET ORAL ONCE
Status: COMPLETED | OUTPATIENT
Start: 2022-04-06 | End: 2022-04-06

## 2022-04-06 RX ORDER — ONDANSETRON 2 MG/ML
4 INJECTION INTRAMUSCULAR; INTRAVENOUS EVERY 6 HOURS PRN
Status: DISCONTINUED | OUTPATIENT
Start: 2022-04-06 | End: 2022-04-07 | Stop reason: HOSPADM

## 2022-04-06 RX ORDER — MECLIZINE HCL 12.5 MG/1
25 TABLET ORAL ONCE
Status: COMPLETED | OUTPATIENT
Start: 2022-04-06 | End: 2022-04-06

## 2022-04-06 RX ORDER — CALCIUM ACETATE 667 MG/1
667 CAPSULE ORAL
Status: DISCONTINUED | OUTPATIENT
Start: 2022-04-07 | End: 2022-04-07 | Stop reason: HOSPADM

## 2022-04-06 RX ORDER — ACETAMINOPHEN 325 MG/1
650 TABLET ORAL EVERY 4 HOURS PRN
Status: DISCONTINUED | OUTPATIENT
Start: 2022-04-06 | End: 2022-04-07 | Stop reason: HOSPADM

## 2022-04-06 RX ORDER — ASPIRIN 81 MG/1
81 TABLET ORAL DAILY
Status: DISCONTINUED | OUTPATIENT
Start: 2022-04-07 | End: 2022-04-07 | Stop reason: HOSPADM

## 2022-04-06 RX ORDER — INSULIN GLARGINE 100 [IU]/ML
14 INJECTION, SOLUTION SUBCUTANEOUS DAILY
Status: DISCONTINUED | OUTPATIENT
Start: 2022-04-07 | End: 2022-04-07 | Stop reason: HOSPADM

## 2022-04-06 RX ORDER — ATORVASTATIN CALCIUM 40 MG/1
40 TABLET, FILM COATED ORAL
Status: DISCONTINUED | OUTPATIENT
Start: 2022-04-06 | End: 2022-04-07 | Stop reason: HOSPADM

## 2022-04-06 RX ORDER — TORSEMIDE 10 MG/1
10 TABLET ORAL
Status: DISCONTINUED | OUTPATIENT
Start: 2022-04-08 | End: 2022-04-07 | Stop reason: HOSPADM

## 2022-04-06 RX ORDER — HEPARIN SODIUM 5000 [USP'U]/ML
5000 INJECTION, SOLUTION INTRAVENOUS; SUBCUTANEOUS EVERY 8 HOURS SCHEDULED
Status: DISCONTINUED | OUTPATIENT
Start: 2022-04-06 | End: 2022-04-07 | Stop reason: HOSPADM

## 2022-04-06 RX ORDER — CARVEDILOL 6.25 MG/1
6.25 TABLET ORAL 2 TIMES DAILY WITH MEALS
Status: DISCONTINUED | OUTPATIENT
Start: 2022-04-07 | End: 2022-04-07 | Stop reason: HOSPADM

## 2022-04-06 RX ADMIN — HEPARIN SODIUM 5000 UNITS: 5000 INJECTION INTRAVENOUS; SUBCUTANEOUS at 22:54

## 2022-04-06 RX ADMIN — MECLIZINE 25 MG: 12.5 TABLET ORAL at 19:14

## 2022-04-06 RX ADMIN — ASPIRIN 325 MG ORAL TABLET 325 MG: 325 PILL ORAL at 20:07

## 2022-04-06 RX ADMIN — INSULIN LISPRO 2 UNITS: 100 INJECTION, SOLUTION INTRAVENOUS; SUBCUTANEOUS at 22:55

## 2022-04-06 NOTE — TELEPHONE ENCOUNTER
Pts wife called stating that pts sugars are high and he's out of insulin and the pharmacy won't give him a refill until the 12th  I also sent a tiger text to Dr Mel Amaya

## 2022-04-06 NOTE — ED PROVIDER NOTES
History  Chief Complaint   Patient presents with    Dizziness     pt c/o dizziness since this morning, reports went to dialysis and received full treatment, tonight came home and continued to feel dizzy, family reports BS of 220 and gave 6 units insulin 30 minutes ago       History provided by:  Patient and spouse   used: No    60-year-old male with history of diabetes, ESRD on HD presented for evaluation of dizziness beginning this morning prior to dialysis  He received full dialysis treatment states he did not feel any better afterwards  Denies feeling off balance, any change in gait, any headache, visual changes, hearing changes, tinnitus, chest pain, shortness of breath, diaphoresis  No history of similar symptoms  Slightly abnormal test of skew bilaterally  No other new focal neurologic deficits on exam   Does have a somewhat wide-based gait but this is chronic per wife  Plan CT head, EKG, labs, re-evaluate  Prior to Admission Medications   Prescriptions Last Dose Informant Patient Reported? Taking? BD Pen Needle Adina U/F 32G X 4 MM MISC 4/6/2022 at Unknown time Spouse/Significant Other No Yes   Sig: USE TO INJECT INSULIN 4 TIMES DAILY   Blood Glucose Monitoring Suppl (True Metrix Meter) w/Device KIT 4/6/2022 at Unknown time Spouse/Significant Other No Yes   Sig: Use to test blood sugars 3 times daily   Blood Pressure Monitoring (BLOOD PRESSURE CUFF) MISC 4/6/2022 at Unknown time Spouse/Significant Other No Yes   Sig: Use to check blood pressure before taking blood pressure medication and 1 hour after and follow instructions provided in discharge instructions based on the readings     Cholecalciferol (Vitamin D3) 1 25 MG (01306 UT) CAPS 4/6/2022 at Unknown time Spouse/Significant Other No Yes   Sig: TAKE 1 CAPSULE BY MOUTH ONE TIME PER WEEK   Continuous Blood Gluc  (DEXCOM G6 ) LANCE 4/6/2022 at Unknown time Spouse/Significant Other No Yes   Sig: Use as directed for continuous glucose monitoring   Continuous Blood Gluc Sensor (DEXCOM G6 SENSOR) MISC 4/6/2022 at Unknown time Spouse/Significant Other No Yes   Sig: Use as directed for continuous glucose monitoring  Change every 10 days   Continuous Blood Gluc Transmit (DEXCOM G6 TRANSMITTER) MISC 4/6/2022 at Unknown time Spouse/Significant Other No Yes   Sig: Use as directed for continuous glucose monitoring-Change every 3 months   Incontinence Supplies (MALE URINAL) MISC 4/6/2022 at Unknown time Spouse/Significant Other No Yes   Sig: by Does not apply route daily   Insulin Syringe-Needle U-100 (B-D INS SYRINGE 0 5CC/30GX1/2") 30G X 1/2" 0 5 ML [facility] 4/6/2022 at Unknown time Spouse/Significant Other No Yes   Sig: Inject under the skin 4 (four) times a day   Lancets Ultra Thin 30G MISC 4/6/2022 at Unknown time Spouse/Significant Other No Yes   Sig: Use 3 times a day   Nutritional Supplements (VITAMIN D BOOSTER PO) 4/6/2022 at Unknown time Spouse/Significant Other Yes Yes   Sig: Take 0 5 mcg by mouth     ONETOUCH DELICA LANCETS 94Y MISC 4/6/2022 at Unknown time Spouse/Significant Other No Yes   Sig: by Does not apply route 3 (three) times a day   TORSEMIDE PO 4/6/2022 at Unknown time Spouse/Significant Other Yes Yes   Sig: Take 50 mg by mouth Pt takes 50 mg daily on non- dialysis days: sat, sun, Tues, and thrusday   Pt takes 10 mg of torsemide daily on dialysis days m- w- f     ammonium lactate (LAC-HYDRIN) 12 % cream Not Taking at Unknown time  No No   Sig: Apply topically as needed for dry skin   Patient not taking: Reported on 4/6/2022    aspirin (ECOTRIN LOW STRENGTH) 81 mg EC tablet 4/6/2022 at Unknown time Spouse/Significant Other Yes Yes   Sig: Take 81 mg by mouth daily Resume on 8/14     atorvastatin (LIPITOR) 40 mg tablet 4/5/2022 at Unknown time Spouse/Significant Other No Yes   Sig: Take 1 tablet (40 mg total) by mouth daily with dinner   calcium acetate (CALPHRON) 667 mg 4/6/2022 at Unknown time Spouse/Significant Other Yes Yes   Sig: TAKE 2 TABLETS BY MOUTH THREE TIMES DAILY WITH MEALS   diphenhydrAMINE (BENADRYL) 25 mg tablet   No No   Sig: Take 1 tablet (25 mg total) by mouth every 6 (six) hours as needed for itching for up to 2 days   doxercalciferol (HECTOROL) 0 5 mcg capsule 2022 at Unknown time Spouse/Significant Other Yes Yes   Si mcg   heparin 1000 units in sterile water 3000 mL irrigation bag 2022 at Unknown time  Yes Yes   Sig: Heparin Sodium (Porcine) 1,000 Units/mL Systemic   insulin lispro (HumaLOG KwikPen) 100 units/mL injection pen  Spouse/Significant Other No No   Sig: INJECT 4 UNITS 3 TIMES A DAY WITH MEALS PLUS SCALE (max daily dose 42 units)   iron sucrose (VENOFER) Not Taking at Unknown time  Yes No   Si mg Once every 2 weeks   Patient not taking: Reported on 2022    sertraline (Zoloft) 50 mg tablet Not Taking at Unknown time  No No   Sig: Take 1/2 tablet every day at bedtime for 4 days, then increase to 1 tablet every day at bedtime   Patient not taking: Reported on 2022       Facility-Administered Medications: None       Past Medical History:   Diagnosis Date    Cerebrovascular [facility]CVA) due [facility]    Chronic kidney disease     Diabetes mellitus ([facility] )     GERD (gastroesophageal reflux disease)     Hypercholesteremia     Hyperlipidemia     Hypertension     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    Neuropathy     Obesity     Osteomyelitis ([facility] )     last assessed 16    PVC's (premature ventricular contractions)     sees cardiology Dr Julian caamrgo    Stroke [facility])     last weeof 2018 [facility]    TIA (transient ischemic attack) 10/28/2018       Past Surgical History:   Procedure Laterality Date    ABDOMINAL SURGERY      CHOLECYSTECTOMY      Percutaneous    COLONOSCOPY      CYSTOSCOPY      OTHER SURGICAL HISTORY      "stimulator to control bowel movements"    GA ESOPHAGOGASTRODUODENOSCOPY TRANSORAL DIAGNOSTIC N/A 9/27/2016    Procedure: ESOPHAGOGASTRODUODENOSCOPY (EGD); Surgeon: Vini Terry MD;  [facility]; Service: Gastroenterology    MN LAP,CHOLECYSTECTOMY N/A 2/29/2016    Procedure: LAPAROSCOPIC CHOLECYSTECTOMY ;  Surgeon: Mayank Ott DO;  Location[facility];  Service: General    ROTATOR CUFF REPAIR Right     TOE AMPUTATION Right 10/28/2016    Procedure: 3RD TOE AMPUTATION ;  Surgeon: Roberta Grover DPM;  [facility];  Service:        Family History   Problem Relation Age of Onset    Leukemia Mother     Liver disease Mother     Lung cancer Mother         heavy smoker - 3 ppd    Heart disease Father     Liver disease Father     Multiple myeloma Sister     Breast cancer Sister     Urolithiasis Family     Alcohol abuse Neg Hx     Depression Neg Hx     Drug abuse Neg Hx     Substance Abuse Neg Hx     Mental illness Neg Hx      I have reviewed and agree with the history as documented  E-Cigarette/Vaping    E-Cigarette Use Never User      E-Cigarette/Vaping Substances    Nicotine No     THC No     CBD No     Flavoring No     Other No     Unknown No      Social History     Tobacco Use    Smoking status: Never Smoker    Smokeless tobacco: Never Used   Vaping Use    Vaping Use: Never used   Substance Use Topics    Alcohol use: Not Currently    Drug use: No       Review of Systems   Constitutional: Negative for activity change and appetite change  Respiratory: Negative for apnea  Cardiovascular: Negative for chest pain  Gastrointestinal: Negative for abdominal pain, nausea and vomiting  Genitourinary: Positive for difficulty urinating  Musculoskeletal: Negative for back pain and neck pain  Skin: Negative for color change and rash  Neurological: Positive for dizziness  All other systems reviewed and are negative  Physical Exam  Physical Exam  Vitals and nursing note reviewed     Constitutional: Appearance: Normal appearance  HENT:      Head: Normocephalic and atraumatic  Mouth/Throat:      Mouth: Mucous membranes are dry  Cardiovascular:      Rate and Rhythm: Normal rate and regular rhythm  Abdominal:      General: There is no distension  Palpations: Abdomen is soft  Tenderness: There is no abdominal tenderness  Musculoskeletal:         General: Swelling present  Normal range of motion  Cervical back: Normal range of motion and neck supple  Skin:     General: Skin is warm and dry  Neurological:      General: No focal deficit present  Mental Status: He is alert and oriented to person, place, and time  Motor: Weakness present           Vital Signs  ED Triage Vitals   Temperature Pulse Respirations Blood Pressure SpO2   04/06/22 1827 04/06/22 1827 04/06/22 1827 04/06/22 1827 04/06/22 1827   98 2 °F (36 8 °C) 87 18 122/63 95 %      Temp Source Heart Rate Source Patient Position - Orthostatic VS BP Location FiO2 (%)   04/06/22 2134 04/06/22 1845 04/06/22 2134 04/06/22 2134 --   Oral Monitor Sitting Left arm       Pain Score       04/06/22 1827       5           Vitals:    04/06/22 2134 04/06/22 2247 04/06/22 2250 04/07/22 0803   BP: 130/65 135/62 117/59 132/72   Pulse: 82 80  87   Patient Position - Orthostatic VS: Sitting Sitting Standing Sitting         Visual Acuity      ED Medications  Medications   meclizine (ANTIVERT) tablet 25 mg (25 mg Oral Given 4/6/22 1914)   aspirin tablet 325 mg (325 mg Oral Given 4/6/22 2007)       Diagnostic Studies  Results Reviewed     Procedure Component Value Units Date/Time    HS Troponin I 2hr [[id number]]  (Abnormal) Collected: 04/06/22 2107    Lab Status: Final result Specimen: Blood from Arm, Right Updated: 04/06/22 2142     hs TnI 2hr 62 ng/L      Delta 2hr hsTnI 0 ng/L     TSH [[id number]]  (Normal) Collected: 04/06/22 1915    Lab Status: Final result Specimen: Blood from Arm, Right Updated: 04/06/22 1953     TSH [facility] 1 276 uIU/mL     Narrative:      Patients undergoing fluorescein dye angiography may retain small amounts of fluorescein in the body for 48-72 hours post procedure  Samples containing fluorescein can produce falsely depressed TSH values  If the patient had this procedure,a specimen should be resubmitted post fluorescein clearance        Magnesium [[id number]]  (Normal) Collected: 04/06/22 1915    Lab Status: Final result Specimen: Blood from Arm, Right Updated: 04/06/22 1953     Magnesium 2 0 mg/dL     HS Troponin 0hr (reflex protocol) [[id number]]  (Abnormal) Collected: 04/06/22 1915    Lab Status: Final result Specimen: Blood from Arm, Right Updated: 04/06/22 1950     hs TnI 0hr 62 ng/L     Comprehensive metabolic panel [[id number]]  (Abnormal) Collected: 04/06/22 1915    Lab Status: Final result Specimen: Blood from Arm, Right Updated: 04/06/22 1944     Sodium 139 mmol/L      Potassium 4 4 mmol/L      Chloride 96 mmol/L      CO2 34 mmol/L      ANION GAP 9 mmol/L      BUN 37 mg/dL      Creatinine 5 79 mg/dL      Glucose 246 mg/dL      Calcium 8 8 mg/dL      AST 14 U/L      ALT 22 U/L      Alkaline Phosphatase 89 U/L      Total Protein 7 6 g/dL      Albumin 3 6 g/dL      Total Bilirubin 0 45 mg/dL      eGFR 9 ml/min/1 73sq m     Narrative:      [place] guidelines for Chronic Kidney Disease (CKD):     Stage 1 with normal or high GFR (GFR > 90 mL/min/1 73 square meters)    Stage 2 Mild CKD (GFR = 60-89 mL/min/1 73 square meters)    Stage 3A Moderate CKD (GFR = 45-59 mL/min/1 73 square meters)    Stage 3B Moderate CKD (GFR = 30-44 mL/min/1 73 square meters)    Stage 4 Severe CKD (GFR = 15-29 mL/min/1 73 square meters)    Stage 5 End Stage CKD (GFR <15 mL/min/1 73 square meters)  Note: GFR calculation is accurate only with a steady state creatinine    Protime-INR [[id number]]  (Normal) Collected: 04/06/22 1915    Lab Status: Final result Specimen: Blood from Arm, Right Updated: 04/06/22 1936 Protime 13 3 seconds      INR 1 01    APTT [[id number]]  (Normal) Collected: 04/06/22 1915    Lab Status: Final result Specimen: Blood from Arm, Right Updated: 04/06/22 1936     PTT 30 seconds     CBC and differential [[id number]]  (Abnormal) Collected: 04/06/22 1915    Lab Status: Final result Specimen: Blood from Arm, Right Updated: 04/06/22 1921     WBC 5 82 Thousand/uL      RBC 3 70 Million/uL      Hemoglobin 11 3 g/dL      Hematocrit 35 0 %      MCV 95 fL      MCH 30 5 pg      MCHC 32 3 g/dL      RDW 14 4 %      MPV 10 9 fL      Platelets 707 Thousands/uL      nRBC 0 /100 WBCs      Neutrophils Relative 70 %      Immat GRANS % 1 %      Lymphocytes Relative 19 %      Monocytes Relative 9 %      Eosinophils Relative 1 %      Basophils Relative 0 %      Neutrophils Absolute 4 04 Thousands/µL      Immature Grans Absolute 0 05 Thousand/uL      Lymphocytes Absolute 1 10 Thousands/µL      Monocytes Absolute 0 53 Thousand/µL      Eosinophils Absolute 0 08 Thousand/µL      Basophils Absolute 0 02 Thousands/µL     Fingerstick Glucose (POCT) [[id number]]  (Abnormal) Collected: 04/06/22 1843    Lab Status: Final result Updated: 04/06/22 1845     POC Glucose 245 mg/dl                  CT head without contrast   Final Result by Nino Dugan MD (04/06 1934)      No acute intracranial pathology  Stable chronic ischemic changes              Workstation performed: [id number]                    Procedures  ECG 12 Lead Documentation Only    Date/Time: 4/6/2022 7:22 PM  Performed by: Maya Salmeron MD  Authorized by: Maya Salmeron MD     Indications / Diagnosis:  Dizziness  ECG reviewed by me, the ED Provider: yes    Patient location:  ED  Previous ECG:     Previous ECG:  Compared to current    Comparison ECG info:  1/2/22    Similarity:  Changes noted  Rate:     ECG rate:  82  Rhythm:     Rhythm: sinus rhythm    Ectopy:     Ectopy: none    QRS:     QRS axis:  Left  Conduction:     Conduction: abnormal      Abnormal conduction: bifascicular block    ST segments:     ST segments:  Abnormal    Depression:  II, aVF, V4, V5 and V6  T waves:     T waves: inverted      Inverted:  II, aVF, V4 and V5             ED Course  ED Course as of 05/02/22 0011 Wed Apr 06, 2022 1953 hs TnI 0hr(!): 62             HEART Risk Score      Most Recent Value   Heart Score Risk Calculator    History 1 Filed at: 04/06/2022 2354   ECG 2 Filed at: 04/06/2022 2354   Age 1 Filed at: 04/06/2022 2354   Risk Factors 2 Filed at: 04/06/2022 2354   Troponin 2 Filed at: 04/06/2022 2354   HEART Score 8 Filed at: 04/06/2022 2354                        SBIRT 22yo+      Most Recent Value   SBIRT (25 yo +)    In order to provide better care to our patients, we are screening all of our patients for alcohol and drug use  Would it be okay to ask you these screening questions? Yes Filed at: 04/06/2022 1843   Initial Alcohol Screen: US AUDIT-C     1  How often do you have a drink containing alcohol? 0 Filed at: 04/06/2022 1843   2  How many drinks containing alcohol do you have on a typical day you are drinking? 0 Filed at: 04/06/2022 1843   3a  Male UNDER 65: How often do you have five or more drinks on one occasion? 0 Filed at: 04/06/2022 1843   Audit-C Score 0 Filed at: 04/06/2022 1843   XIN: How many times in the past year have you    Used an illegal drug or used a prescription medication for non-medical reasons?  Never Filed at: 04/06/2022 1843        JALIL Risk Score      Most Recent Value   Age >= 72 0 Filed at: 04/06/2022 2134   Known CAD (stenosis >= 50%) 0 Filed at: 04/06/2022 2134   Recent (<=24 hrs) Service Angina 0 Filed at: 04/06/2022 2134   ST Deviation >= 0 5 mm 0 Filed at: 04/06/2022 2134   3+ CAD Risk Factors (FHx, HTN, HLP, DM, Smoker) 1 Filed at: 04/06/2022 2134   Aspirin Use Past 7 Days 1 Filed at: 04/06/2022 2134   Elevated Cardiac Markers 1 Filed at: 04/06/2022 2134   JALIL Risk Score (Calculated) 3 Filed at: 04/06/2022 2134 MDM  Number of Diagnoses or Management Options  Abnormal EKG: new and requires workup  Dizziness: new and requires workup  Elevated troponin: new and requires workup  Diagnosis management comments: 26-year-old male with history end-stage renal disease on dialysis presented for evaluation some dizziness on this morning before his dialysis treatment today  Reports feeling unwell overall  EKG notable for inferior lateral T-wave changes and mild ST depression  Admitted to medical service for further evaluation including serial troponin repeat EKG  Amount and/or Complexity of Data Reviewed  Clinical lab tests: ordered and reviewed  Tests in the radiology section of CPT®: reviewed  Obtain history from someone other than the patient: yes  Independent visualization of images, tracings, or specimens: yes    Patient Progress  Patient progress: improved      Disposition  Final diagnoses:   Dizziness   Elevated troponin   Abnormal EKG     Time reflects when diagnosis was documented in both MDM as applicable and the Disposition within this note     Time User Action Codes Description Comment    4/6/2022  8:07 PM Alray Sine Add [R42] Dizziness     4/6/2022  8:07 PM Snow JAMES Add [R77 8] Elevated troponin     4/6/2022  8:07 PM Alray Sine Add [R94 31] Abnormal EKG       ED Disposition     ED Disposition Condition Date/Time Comment    Admit Stable Wed Apr 6, 2022  8:54 PM Case was discussed with [facility] resident and the patient's admission status was agreed to be Admission Status: observation status to the service of Dr Milly Lagos   Follow-up Information     Follow up With Specialties Details [address], DO Internal Medicine Call in 1 week(s)  [address] [facility]      Krissy Boykin MD Endocrinology   [address] [place] [address]  [phone]            Discharge Medication List as of 4/7/2022 12:19 PM      START taking these medications    Details   meclizine (ANTIVERT) 25 mg tablet Take 1 tablet (25 mg total) by mouth every 8 (eight) hours as needed for dizziness or nausea, Starting Thu 4/7/2022, Normal         CONTINUE these medications which have NOT CHANGED    Details   aspirin (ECOTRIN LOW STRENGTH) 81 mg EC tablet Take 81 mg by mouth daily Resume on 8/14  , Historical Med      atorvastatin (LIPITOR) 40 mg tablet Take 1 tablet (40 mg total) by mouth daily with dinner, Starting Mon 12/13/2021, Normal      BD Pen Needle Adina U/F 32G X 4 MM MISC USE TO INJECT INSULIN 4 TIMES DAILY, Normal      Blood Glucose Monitoring Suppl (True Metrix Meter) w/Device KIT Use to test blood sugars 3 times daily, Normal      Blood Pressure Monitoring (BLOOD PRESSURE CUFF) MISC Use to check blood pressure before taking blood pressure medication and 1 hour after and follow instructions provided in discharge instructions based on the readings  , Print      calcium acetate (CALPHRON) 667 mg TAKE 2 TABLETS BY MOUTH THREE TIMES DAILY WITH MEALS, Historical Med      Cholecalciferol (Vitamin D3) 1 25 MG (95162 UT) CAPS TAKE 1 CAPSULE BY MOUTH ONE TIME PER WEEK, Normal      Continuous Blood Gluc  (DEXCOM G6 ) LANCE Use as directed for continuous glucose monitoring, Normal      Continuous Blood Gluc Sensor (DEXCOM G6 SENSOR) MISC Use as directed for continuous glucose monitoring   Change every 10 days, Normal      Continuous Blood Gluc Transmit (DEXCOM G6 TRANSMITTER) MISC Use as directed for continuous glucose monitoring-Change every 3 months, Normal      doxercalciferol (HECTOROL) 0 5 mcg capsule 4 mcg, Starting Wed 11/10/2021, Until Wed 11/9/2022 at 2359, Historical Med      Incontinence Supplies (MALE URINAL) MISC by Does not apply route daily, Starting Mon 9/24/2018, Print      insulin lispro (HumaLOG KwikPen) 100 units/mL injection pen INJECT 4 UNITS 3 TIMES A DAY WITH MEALS PLUS SCALE (max daily dose 42 units), Normal      Insulin Syringe-Needle U-100 (B-D INS SYRINGE 0 5CC/30GX1/2") 30G X 1/2" 0 5 ML MISC Inject under the skin 4 (four) times a day, Starting Fri 6/5/2020, Normal      !! Lancets Ultra Thin 30G MISC Use 3 times a day, Normal      Nutritional Supplements (VITAMIN D BOOSTER PO) Take 0 5 mcg by mouth  , Starting Mon 6/21/2021, Until Mon 6/20/2022 at 2359, Historical Med      !! ONETOUCH DELICA LANCETS 19T MISC by Does not apply route 3 (three) times a day, Starting Tue 11/27/2018, Normal      TORSEMIDE PO Take 50 mg by mouth Pt takes 50 mg daily on non- dialysis days: sat, sun, Tues, and thrusday  Pt takes 10 mg of torsemide daily on dialysis days m- w- f  , Starting Mon 10/25/2021, Historical Med      carvedilol (COREG) 6 25 mg tablet Take 1 tablet (6 25 mg total) by mouth 2 (two) times a day with meals Or as directed by your kidney doctor, Starting Thu 2/3/2022, Normal      glucose blood (True Metrix Blood Glucose Test) test strip Use 1 each 3 (three) times a day Use as instructed, Starting Mon 3/28/2022, Normal      Lantus 100 UNIT/ML subcutaneous injection INJECT 14 UNITS UNDER THE SKIN DAILY, Starting Wed 10/20/2021, Normal       !! - Potential duplicate medications found  Please discuss with provider        STOP taking these medications       ammonium lactate (LAC-HYDRIN) 12 % cream Comments:   Reason for Stopping:         diphenhydrAMINE (BENADRYL) 25 mg tablet Comments:   Reason for Stopping:         heparin 1000 units in sterile water 3000 mL irrigation bag Comments:   Reason for Stopping:         iron sucrose (VENOFER) Comments:   Reason for Stopping:         sertraline (Zoloft) 50 mg tablet Comments:   Reason for Stopping:               Outpatient Discharge Orders   Discharge Diet     Activity as tolerated     Call provider for:  persistent nausea or vomiting     Call provider for:  severe uncontrolled pain     Call provider for:  redness, tenderness, or signs of infection (pain, swelling, redness, odor or green/yellow discharge around incision site)     Call provider for: active or persistent bleeding     Call provider for:  difficulty breathing, headache or visual disturbances     Call provider for:  hives     Call provider for:  persistent dizziness or light-headedness     Call provider for:  extreme fatigue     No dressing needed       PDMP Review       Value Time User    PDMP Reviewed  Yes 12/30/2020 10:40 PM Gabrielle Gandhi MD          ED Provider  Electronically Signed by           Leeroy Yang MD  05/02/22 0855

## 2022-04-07 ENCOUNTER — OFFICE VISIT (OUTPATIENT)
Dept: CARDIOLOGY CLINIC | Facility: CLINIC | Age: 62
End: 2022-04-07
Payer: MEDICARE

## 2022-04-07 VITALS
DIASTOLIC BLOOD PRESSURE: 82 MMHG | HEIGHT: 69 IN | HEART RATE: 72 BPM | SYSTOLIC BLOOD PRESSURE: 128 MMHG | BODY MASS INDEX: 35.4 KG/M2 | TEMPERATURE: 98 F | WEIGHT: 239 LBS | OXYGEN SATURATION: 96 %

## 2022-04-07 VITALS
BODY MASS INDEX: 35.4 KG/M2 | HEART RATE: 87 BPM | OXYGEN SATURATION: 97 % | WEIGHT: 239 LBS | DIASTOLIC BLOOD PRESSURE: 72 MMHG | TEMPERATURE: 98.3 F | HEIGHT: 69 IN | RESPIRATION RATE: 18 BRPM | SYSTOLIC BLOOD PRESSURE: 132 MMHG

## 2022-04-07 DIAGNOSIS — R06.02 SHORTNESS OF BREATH: ICD-10-CM

## 2022-04-07 DIAGNOSIS — I12.0 BENIGN HYPERTENSION WITH CHRONIC KIDNEY DISEASE, STAGE V (HCC): ICD-10-CM

## 2022-04-07 DIAGNOSIS — Z99.2 ESRD ON DIALYSIS (HCC): ICD-10-CM

## 2022-04-07 DIAGNOSIS — R42 DIZZINESS: ICD-10-CM

## 2022-04-07 DIAGNOSIS — Z86.73 HISTORY OF STROKE: ICD-10-CM

## 2022-04-07 DIAGNOSIS — Z79.4 TYPE 2 DIABETES MELLITUS WITH HYPERGLYCEMIA, WITH LONG-TERM CURRENT USE OF INSULIN (HCC): ICD-10-CM

## 2022-04-07 DIAGNOSIS — E11.65 TYPE 2 DIABETES MELLITUS WITH HYPERGLYCEMIA, WITH LONG-TERM CURRENT USE OF INSULIN (HCC): ICD-10-CM

## 2022-04-07 DIAGNOSIS — N18.5 BENIGN HYPERTENSION WITH CHRONIC KIDNEY DISEASE, STAGE V (HCC): ICD-10-CM

## 2022-04-07 DIAGNOSIS — N18.6 ESRD ON DIALYSIS (HCC): ICD-10-CM

## 2022-04-07 DIAGNOSIS — E78.2 MIXED HYPERLIPIDEMIA: ICD-10-CM

## 2022-04-07 LAB
ANION GAP SERPL CALCULATED.3IONS-SCNC: 13 MMOL/L (ref 4–13)
BUN SERPL-MCNC: 47 MG/DL (ref 5–25)
CALCIUM SERPL-MCNC: 9.1 MG/DL (ref 8.3–10.1)
CARDIAC TROPONIN I PNL SERPL HS: 55 NG/L
CHLORIDE SERPL-SCNC: 98 MMOL/L (ref 100–108)
CO2 SERPL-SCNC: 29 MMOL/L (ref 21–32)
CREAT SERPL-MCNC: 6.8 MG/DL (ref 0.6–1.3)
ERYTHROCYTE [DISTWIDTH] IN BLOOD BY AUTOMATED COUNT: 14.5 % (ref 11.6–15.1)
GFR SERPL CREATININE-BSD FRML MDRD: 7 ML/MIN/1.73SQ M
GLUCOSE SERPL-MCNC: 129 MG/DL (ref 65–140)
GLUCOSE SERPL-MCNC: 144 MG/DL (ref 65–140)
HCT VFR BLD AUTO: 34.2 % (ref 36.5–49.3)
HGB BLD-MCNC: 11 G/DL (ref 12–17)
MAGNESIUM SERPL-MCNC: 2.3 MG/DL (ref 1.6–2.6)
MCH RBC QN AUTO: 31.2 PG (ref 26.8–34.3)
MCHC RBC AUTO-ENTMCNC: 32.2 G/DL (ref 31.4–37.4)
MCV RBC AUTO: 97 FL (ref 82–98)
PLATELET # BLD AUTO: 128 THOUSANDS/UL (ref 149–390)
PMV BLD AUTO: 10.6 FL (ref 8.9–12.7)
POTASSIUM SERPL-SCNC: 4.3 MMOL/L (ref 3.5–5.3)
RBC # BLD AUTO: 3.53 MILLION/UL (ref 3.88–5.62)
SODIUM SERPL-SCNC: 140 MMOL/L (ref 136–145)
WBC # BLD AUTO: 6.04 THOUSAND/UL (ref 4.31–10.16)

## 2022-04-07 PROCEDURE — 80048 BASIC METABOLIC PNL TOTAL CA: CPT | Performed by: INTERNAL MEDICINE

## 2022-04-07 PROCEDURE — 99236 HOSP IP/OBS SAME DATE HI 85: CPT | Performed by: INTERNAL MEDICINE

## 2022-04-07 PROCEDURE — 85027 COMPLETE CBC AUTOMATED: CPT | Performed by: INTERNAL MEDICINE

## 2022-04-07 PROCEDURE — 82948 REAGENT STRIP/BLOOD GLUCOSE: CPT

## 2022-04-07 PROCEDURE — 99214 OFFICE O/P EST MOD 30 MIN: CPT | Performed by: INTERNAL MEDICINE

## 2022-04-07 PROCEDURE — 83735 ASSAY OF MAGNESIUM: CPT | Performed by: INTERNAL MEDICINE

## 2022-04-07 RX ORDER — MECLIZINE HYDROCHLORIDE 25 MG/1
25 TABLET ORAL EVERY 8 HOURS PRN
Status: DISCONTINUED | OUTPATIENT
Start: 2022-04-07 | End: 2022-04-07 | Stop reason: HOSPADM

## 2022-04-07 RX ORDER — MECLIZINE HYDROCHLORIDE 25 MG/1
25 TABLET ORAL EVERY 8 HOURS PRN
Qty: 30 TABLET | Refills: 0 | Status: SHIPPED | OUTPATIENT
Start: 2022-04-07 | End: 2022-06-15

## 2022-04-07 RX ORDER — INSULIN LISPRO 100 [IU]/ML
INJECTION, SOLUTION INTRAVENOUS; SUBCUTANEOUS
Qty: 45 ML | Refills: 1 | Status: SHIPPED | OUTPATIENT
Start: 2022-04-07

## 2022-04-07 RX ADMIN — INSULIN GLARGINE 14 UNITS: 100 INJECTION, SOLUTION SUBCUTANEOUS at 08:45

## 2022-04-07 RX ADMIN — TORSEMIDE 50 MG: 20 TABLET ORAL at 08:45

## 2022-04-07 RX ADMIN — INSULIN LISPRO 4 UNITS: 100 INJECTION, SOLUTION INTRAVENOUS; SUBCUTANEOUS at 12:28

## 2022-04-07 RX ADMIN — INSULIN LISPRO 3 UNITS: 100 INJECTION, SOLUTION INTRAVENOUS; SUBCUTANEOUS at 08:46

## 2022-04-07 RX ADMIN — CARVEDILOL 6.25 MG: 6.25 TABLET, FILM COATED ORAL at 08:45

## 2022-04-07 RX ADMIN — ASPIRIN 81 MG: 81 TABLET, COATED ORAL at 08:45

## 2022-04-07 RX ADMIN — CALCIUM ACETATE 667 MG: 667 CAPSULE ORAL at 12:39

## 2022-04-07 RX ADMIN — INSULIN LISPRO 4 UNITS: 100 INJECTION, SOLUTION INTRAVENOUS; SUBCUTANEOUS at 08:46

## 2022-04-07 RX ADMIN — CALCIUM ACETATE 667 MG: 667 CAPSULE ORAL at 08:45

## 2022-04-07 RX ADMIN — HEPARIN SODIUM 5000 UNITS: 5000 INJECTION INTRAVENOUS; SUBCUTANEOUS at 05:32

## 2022-04-07 NOTE — ASSESSMENT & PLAN NOTE
Lab Results   Component Value Date    HGBA1C 6 4 12/09/2021       Recent Labs     04/06/22  1843 04/06/22  2148   POCGLU 245* 252*       Blood Sugar Average: Last 72 hrs:  (P) 248 5   · Home regimen:  Lantus 14 units in a m ; 4 units Humalog t i d   With meals  · SSI  · Q i d  Accu-Cheks  · Hypoglycemia protocol  · Carb controlled diet

## 2022-04-07 NOTE — PROGRESS NOTES
Consultation - Cardiology Office   [facility]  Charlene Morales 58 y o  male MRN: [id number]  : 1960  Unit/Bed#:  Encounter: [id number]      Assessment:     1  Shortness of breath    2  Benign hypertension with chronic kidney disease, stage V ([facility] [address] )    3  [facility]    4  Mixed hyperlipidemia    5  Type 2 diabetes mellitus with hyperglycemia, with long-term current use of insulin (HCC)    6  History of stroke    7  Dizziness        Discussion summary and Plan:    1  History of end-stage renal disease on dialysis  Patient get dialysis on   He is looking to have transplant  Was recently admitted to hospital in fact he was discharged today for dizziness lightheadedness after dialysis  He was orthostatic positive  As per his wife he does not follow the commands as given by nephrology is not to drink water  He has always fluid overload and end up getting full dialysis  2  Exertion shortness of breath  He denies any shortness of breath  He has some mild exertional shortness of breath which is not changed particularly after dialysis days otherwise he feels well    3  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  Patient on medical Rx he has recovered lot of ambulatory function  Some memory issues no other problems      4  Dyslipidemia  Continue high intensity statins    5  Essential hypertension  Patient blood pressure is acceptable currently he is taking carvedilol 6 25 twice a day along with Demadex  Blood pressure has been stable until recently when he was orthostatic positive  He will follow up with Nephrology tomorrow at the dialysis unit    6  Obesity with BMI around 35  Advised to lose weight  7  Incontinent  Could be due to dysautonomia or neurogenic bladder  May need Botox injections    8  Recent admission for dizziness and positive troponin  Etiology appears to be orthostatic hypotension    EKG did show T-wave changes but seems to have normalized  He had previously nonischemic nuclear stress test in May 2021 and already scheduled to have repeat stress test   We discussed the option of cardiac catheterization  Patient and family is reluctant due to his previous history of stroke  They would initially prefer noninvasive approach all risk and benefit discussed with each approach  He is asymptomatic at this time    9  RBBB which is chronic  Continue current Rx strongly advised him to lose weight  And continue current Rx  Thank you for your consultation  If you have any question please call me at [phone]    Counseling :  A description of the counseling  Goals and Barriers  Patient's ability to self care: Yes  Medication side effect reviewed with patient in detail and all their questions answered to their satisfaction  Primary Care Physician : Tahmina Goins DO      HPI :     Keya Moore is a 58y o  year old male who was was initially seen by us many years ago was recently [facility] with near syncopal episode  Patient has past medical significant for chronic kidney disease, diabetes mellitus, diarrhea, history of stroke who presents with unresponsive episode  Patient was going to flea market in his car with his family, he was not driving  Family noted him to be very sweaty and pale, and became unresponsive  Patient was brought to the emergency department and noted to have blood glucose of 40  Patient is not doing well  He denies any new complaints  His echo from [facility] reviewed  During workup he was found to have CKD stage 3/4  He now follows up with [facility]  He is doing well  His kidney function has stabilized  He does occasionally get short of breath when he walks  After his stroke is not that active  He is having lot of problem with his urine incontinent  He is scheduled to have a procedure done by urology   He may need clearance for that procedure  minute     09/21/2021  Above reviewed  Patient came for follow-up he is doing well from cardiac point of view  He had now AV fistula in the left arm and he get dialysis through that  He has no problem getting dialysis  He is on dialysis on Monday Wednesday Friday  He has medical history significant for hypertension, diabetes mellitus, history of stroke and abnormal EKG  He has a nonischemic nuclear in May of 2021  He is trying hard to lose weight so that he can be on the list   He has no nausea no vomiting no fever no chills  He has some itchiness and has some open wound he is using cream   No other cardiovascular issues today vitals has been stable  Labs reviewed  01/20/2022  Above reviewed  Patient was just discharged from the hospital   He was admitted after he felt dizzy and lightheadedness yesterday after had complete dialysis  He has medical history significant for end-stage renal disease on dialysis, hypertension, history of stroke, type 2 diabetes mellitus on insulin and history of nonischemic nuclear stress May 2021  His trying hard to lose his weight so that he can be on the our transplant list   EKG yesterday shows new T-wave inversion in lead 2 3 AVF and V5 to V6 and troponin was slightly elevated however delta was 0  He was dizzy and lightheadedness  Today he denies any chest pain any shortness of breath he feels back to baseline  Discharge team as already ordered nuclear stress test on him  His initial EKG at presentation shows he had T-wave inversion inferior leads which has normalized later on  His initial troponin was 62 delta was 0  He was noted to be orthostatic while he was in the ED  His standing blood pressure was 117/59 and his sitting blood pressure was 130/65  He says and his wife was present there that he was given IV fluids and he felt better  He feels absolutely back to baseline no symptoms          Review of Systems   Constitutional: Negative for activity change, chills, diaphoresis, fever and unexpected weight change  HENT: Negative for congestion  Eyes: Negative for discharge and redness  Respiratory: Negative for cough, chest tightness, shortness of breath and wheezing  Cardiovascular: Negative  Negative for chest pain, palpitations and leg swelling  He feels great now  No symptoms at all but his EKG reviewed he was admitted with dizziness  Gastrointestinal: Negative for abdominal pain, diarrhea and nausea  Endocrine: Negative  Genitourinary: Negative for decreased urine volume and urgency  Musculoskeletal: Negative  Negative for arthralgias, back pain and gait problem  Skin: Negative for rash and wound  Allergic/Immunologic: Negative  Neurological: Negative for dizziness, seizures, syncope, weakness, light-headedness and headaches  Hematological: Negative  Psychiatric/Behavioral: Negative for agitation and confusion  The patient is not nervous/anxious          Historical Information   Past Medical History:   Diagnosis Date    Cerebrovascular accident (CVA) due to thrombosis of left middle cerebral artery [facility] 7/29/2018    Chronic kidney disease     Diabetes mellitus [facility]     GERD (gastroesophageal reflux disease)     Hypercholesteremia     Hyperlipidemia     Hypertension     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    Neuropathy     Obesity     Osteomyelitis [facility] )     last assessed 11/4/16    PVC's (premature ventricular contractions)     sees cardiology Dr Erick camargo    Stroke [facility])     last weeof July 2018 [facility]    TIA (transient ischemic attack) 10/28/2018     Past Surgical History:   Procedure Laterality Date    ABDOMINAL SURGERY      CHOLECYSTECTOMY      Percutaneous    COLONOSCOPY      CYSTOSCOPY      OTHER SURGICAL HISTORY      "stimulator to control bowel movements"    PA ESOPHAGOGASTRODUODENOSCOPY TRANSORAL DIAGNOSTIC N/A 9/27/2016    Procedure: ESOPHAGOGASTRODUODENOSCOPY (EGD); Surgeon: Thalia Dunham MD;  Location: [facility]; Service: Gastroenterology    DE LAP,CHOLECYSTECTOMY N/A 2/29/2016    Procedure: LAPAROSCOPIC CHOLECYSTECTOMY ;  Surgeon: Chevy Bright DO;  Location: [facility] Main [facility];  Service: General    ROTATOR CUFF REPAIR Right     TOE AMPUTATION Right 10/28/2016    Procedure: 3RD TOE AMPUTATION ;  Surgeon: Jaden Gamino DPM;  Location: [facility];  Service:      Social History     Substance and Sexual Activity   Alcohol Use Not Currently     Social History     Substance and Sexual Activity   Drug Use No     Social History     Tobacco Use   Smoking Status Never Smoker   Smokeless Tobacco Never Used     Family History:   Family History   Problem Relation Age of Onset    Leukemia Mother     Liver disease Mother     Lung cancer Mother         heavy smoker - 3 ppd    Heart disease Father     Liver disease Father     Multiple myeloma Sister     Breast cancer Sister     Urolithiasis Family     Alcohol abuse Neg Hx     Depression Neg Hx     Drug abuse Neg Hx     Substance Abuse Neg Hx     Mental illness Neg Hx        Meds/Allergies     No Known Allergies    Current Outpatient Medications:     aspirin (ECOTRIN LOW STRENGTH) 81 mg EC tablet, Take 81 mg by mouth daily Resume on 8/14  , Disp: , Rfl:     atorvastatin (LIPITOR) 40 mg tablet, Take 1 tablet (40 mg total) by mouth daily with dinner, Disp: 90 tablet, Rfl: 1    BD Pen Needle Adina U/F 32G X 4 MM MISC, USE TO INJECT INSULIN 4 TIMES DAILY, Disp: 400 each, Rfl: 1    Blood Glucose Monitoring Suppl (True Metrix Meter) w/Device KIT, Use to test blood sugars 3 times daily, Disp: 1 kit, Rfl: 0    Blood Pressure Monitoring (BLOOD PRESSURE CUFF) MISC, Use to check blood pressure before taking blood pressure medication and 1 hour after and follow instructions provided in discharge instructions based on the readings  , Disp: 1 each, Rfl: 0    calcium acetate (CALPHRON) 667 mg, TAKE 2 TABLETS BY MOUTH THREE TIMES DAILY WITH MEALS, Disp: , Rfl:     carvedilol (COREG) 6 25 mg tablet, Take 1 tablet (6 25 mg total) by mouth 2 (two) times a day with meals Or as directed by your kidney doctor, Disp: 180 tablet, Rfl: 1    Cholecalciferol (Vitamin D3) 1 25 MG (45746 UT) CAPS, TAKE 1 CAPSULE BY MOUTH ONE TIME PER WEEK, Disp: 12 capsule, Rfl: 2    Continuous Blood Gluc  (Anila SmartRxs ) LANCE, Use as directed for continuous glucose monitoring, Disp: 1 Device, Rfl: 0    Continuous Blood Gluc Sensor (DEXCOM G6 SENSOR) MISC, Use as directed for continuous glucose monitoring   Change every 10 days, Disp: 1 each, Rfl: 11    Continuous Blood Gluc Transmit (DEXCOM G6 TRANSMITTER) MISC, Use as directed for continuous glucose monitoring-Change every 3 months, Disp: 1 each, Rfl: 3    doxercalciferol (HECTOROL) 0 5 mcg capsule, 4 mcg, Disp: , Rfl:     glucose blood (True Metrix Blood Glucose Test) test strip, Use 1 each 3 (three) times a day Use as instructed, Disp: 100 each, Rfl: 0    Incontinence Supplies (MALE URINAL) MISC, by Does not apply route daily, Disp: 6 each, Rfl: 3    insulin lispro (HumaLOG KwikPen) 100 units/mL injection pen, INJECT 4 UNITS 3 TIMES A DAY WITH MEALS PLUS SCALE (max daily dose 42 units), Disp: 45 mL, Rfl: 1    Insulin Syringe-Needle U-100 (B-D INS SYRINGE 0 5CC/30GX1/2") 30G X 1/2" 0 5 ML MISC, Inject under the skin 4 (four) times a day, Disp: 360 each, Rfl: 1    Lancets Ultra Thin 30G MISC, Use 3 times a day, Disp: 300 each, Rfl: 0    Lantus 100 UNIT/ML subcutaneous injection, INJECT 14 UNITS UNDER THE SKIN DAILY, Disp: 10 mL, Rfl: 1    meclizine (ANTIVERT) 25 mg tablet, Take 1 tablet (25 mg total) by mouth every 8 (eight) hours as needed for dizziness or nausea, Disp: 30 tablet, Rfl: 0    Nutritional Supplements (VITAMIN D BOOSTER PO), Take 0 5 mcg by mouth  , Disp: , Rfl:     ONETOUCH DELICA LANCETS 30U MISC, by Does not apply route 3 (three) times a day, Disp: 270 each, Rfl: 1    TORSEMIDE PO, Take 50 mg by mouth Pt takes 50 mg daily on non- dialysis days: sat, sun, Tues, and thrusday  Pt takes 10 mg of torsemide daily on dialysis days m- w- f  , Disp: , Rfl:   No current facility-administered medications for this visit  Vitals: Blood pressure 128/82, pulse 72, temperature 98 °F (36 7 °C), height 5' 9" (1 753 m), weight 108 kg (239 lb), SpO2 96 %  Body mass index is 35 29 kg/m²  Vitals:    04/07/22 1504   Weight: 108 kg (239 lb)     BP Readings from Last 3 Encounters:   04/07/22 128/82   04/07/22 132/72   03/15/22 132/78         Physical Exam  Constitutional:       General: He is not in acute distress  Appearance: He is well-developed  He is not diaphoretic  Neck:      Thyroid: No thyromegaly  Vascular: No JVD  Trachea: No tracheal deviation  Cardiovascular:      Rate and Rhythm: Normal rate and regular rhythm  Heart sounds: S1 normal and S2 normal  Heart sounds not distant  Murmur heard  Systolic (ejection) murmur is present with a grade of 2/6  No friction rub  No gallop  No S3 or S4 sounds  Pulmonary:      Effort: Pulmonary effort is normal  No respiratory distress  Breath sounds: Normal breath sounds  No wheezing or rales  Chest:      Chest wall: No tenderness  Abdominal:      General: Bowel sounds are normal  There is no distension  Palpations: Abdomen is soft  Tenderness: There is no abdominal tenderness  Musculoskeletal:         General: No deformity  Cervical back: Neck supple  Skin:     General: Skin is warm and dry  Coloration: Skin is not pale  Findings: No rash  Neurological:      Mental Status: He is alert and oriented to person, place, and time  Psychiatric:         Behavior: Behavior normal          Judgment: Judgment normal            Diagnostic Studies Review Cardio:  Nuclear stress test   Nuclear stress test 10/14/2019 were nonischemic EF around 50%  Echo Doppler  Echo Doppler done on 10/06/2020 shows EF 94%, grade 2 diastolic dysfunction, moderate LVH, moderate mitral annulus calcification with mild MR mild-to-moderate aortic stenosis and mild aortic regurgitation       EKG:  EKG today shows normal sinus rhythm nonspecific ST changes heart rate 80 beats per minute  Twelve lead EKG 01/15/2020 shows normal sinus rhythm LVH by voltage  ST abnormality in inferior leads certainly cannot rule ischemia but had a nonischemic nuclear  Twelve lead EKG 03/18/2021 shows normal sinus rhythm heart rate 76 beats per minute nonspecific ST changes in inferior lead no change from old EKG  Twelve lead EKG 04/09/2021 shows normal sinus rhythm bifascicular block heart rate 87 beats per minute  As compared to previous EKG patient has not developed bundle branch block               Lab Review   Lab Results   Component Value Date    WBC 6 04 04/07/2022    HGB 11 0 (L) 04/07/2022    HCT 34 2 (L) 04/07/2022    MCV 97 04/07/2022    RDW 14 5 04/07/2022     (L) 04/07/2022     BMP:  Lab Results   Component Value Date    SODIUM 140 04/07/2022    K 4 3 04/07/2022    CL 98 (L) 04/07/2022    CO2 29 04/07/2022    ANIONGAP 9 01/03/2016    BUN 47 (H) 04/07/2022    CREATININE 6 80 (H) 04/07/2022    GLUC 129 04/07/2022    GLUF 178 (H) 06/18/2021    CALCIUM 9 1 04/07/2022    CORRECTEDCA 9 0 12/31/2020    EGFR 7 04/07/2022    MG 2 3 04/07/2022     LFT:  Lab Results   Component Value Date    AST 14 04/06/2022    ALT 22 04/06/2022    ALKPHOS 89 04/06/2022    TP 7 6 04/06/2022    ALB 3 6 04/06/2022      Lab Results   Component Value Date    ZCB4ZNIUSOFD 1 276 04/06/2022     Lab Results   Component Value Date    HGBA1C 6 4 12/09/2021     Lipid Profile:   Lab Results   Component Value Date    CHOLESTEROL 80 01/11/2020    HDL 31 (L) 01/11/2020    LDLCALC 20 01/11/2020    TRIG 210 (H) 12/30/2020     Lab Results   Component Value Date    CHOLESTEROL 80 01/11/2020    CHOLESTEROL 70 10/29/2018     Lab Results   Component Value Date    BSKSAYP 201 (H) 12/30/2020    CKMB 2 0 12/30/2020    CKMBINDEX <1 0 12/30/2020    TROPONINI <0 02 09/08/2021     Lab Results   Component Value Date    NTBNP 1,859 (H) 01/02/2022            Dr Hai Alcala MD [facility]      "This note has been constructed using a voice recognition system  Therefore there may be syntax, spelling, and/or grammatical errors   Please call if you have any questions  "

## 2022-04-07 NOTE — ASSESSMENT & PLAN NOTE
Lab Results   Component Value Date    EGFR 9 04/06/2022    EGFR 6 01/03/2022    EGFR 7 01/01/2022    CREATININE 5 79 (H) 04/06/2022    CREATININE 8 22 (H) 01/03/2022    CREATININE 6 78 (H) 01/01/2022   · HD at [facility]

## 2022-04-07 NOTE — ASSESSMENT & PLAN NOTE
· Noted today right after dialysis  · CT head unremarkable  · Neuro exam nonfocal  · Dizziness has improved with meclizine  · Etiology unclear; Consider hypotension following dialysis +/- SSRI withdrawal -- patient started Zoloft for 1 week before abruptly self-discontinuing three days prior to presentation; sxs and timing consistent w SSRI withdrawal  · VS negative for orthostatic hypotension, though nearly positive and no supine pressure recorded for complete evaluation -  to 117 from sitting to standing respectively  · P r n   Meclizine  · Pt feels clinically well today, no complaints, reports dizziness has resolved

## 2022-04-07 NOTE — PLAN OF CARE
Problem: Potential for Falls  Goal: Patient will remain free of falls  Description: INTERVENTIONS:  - Educate patient/family on patient safety including physical limitations  - Instruct patient to call for assistance with activity   - Consult OT/PT to assist with strengthening/mobility   - Keep Call bell within reach  - Keep bed low and locked with side rails adjusted as appropriate  - Keep care items and personal belongings within reach  - Initiate and maintain comfort rounds  - Make Fall Risk Sign visible to staff  - Offer Toileting every  Hours, in advance of need  - Initiate/Maintain alarm  - Obtain necessary fall risk management equipment:   - Apply yellow socks and bracelet for high fall risk patients  - Consider moving patient to room near nurses station  Outcome: Progressing     Problem: Nutrition/Hydration-ADULT  Goal: Nutrient/Hydration intake appropriate for improving, restoring or maintaining nutritional needs  Description: Monitor and assess patient's nutrition/hydration status for malnutrition  Collaborate with interdisciplinary team and initiate plan and interventions as ordered  Monitor patient's weight and dietary intake as ordered or per policy  Utilize nutrition screening tool and intervene as necessary  Determine patient's food preferences and provide high-protein, high-caloric foods as appropriate       INTERVENTIONS:  - Monitor oral intake, urinary output, labs, and treatment plans  - Assess nutrition and hydration status and recommend course of action  - Evaluate amount of meals eaten  - Assist patient with eating if necessary   - Allow adequate time for meals  - Recommend/ encourage appropriate diets, oral nutritional supplements, and vitamin/mineral supplements  - Order, calculate, and assess calorie counts as needed  - Recommend, monitor, and adjust tube feedings and TPN/PPN based on assessed needs  - Assess need for intravenous fluids  - Provide specific nutrition/hydration education as appropriate  - Include patient/family/caregiver in decisions related to nutrition  Outcome: Progressing     Problem: MOBILITY - ADULT  Goal: Maintain or return to baseline ADL function  Description: INTERVENTIONS:  -  Assess patient's ability to carry out ADLs; assess patient's baseline for ADL function and identify physical deficits which impact ability to perform ADLs (bathing, care of mouth/teeth, toileting, grooming, dressing, etc )  - Assess/evaluate cause of self-care deficits   - Assess range of motion  - Assess patient's mobility; develop plan if impaired  - Assess patient's need for assistive devices and provide as appropriate  - Encourage maximum independence but intervene and supervise when necessary  - Involve family in performance of ADLs  - Assess for home care needs following discharge   - Consider OT consult to assist with ADL evaluation and planning for discharge  - Provide patient education as appropriate  Outcome: Progressing  Goal: Maintains/Returns to pre admission functional level  Description: INTERVENTIONS:  - Perform BMAT or MOVE assessment daily    - Set and communicate daily mobility goal to care team and patient/family/caregiver  - Collaborate with rehabilitation services on mobility goals if consulted  - Perform Range of Motion times a day  - Reposition patient every hours  - Dangle patient times a day  - Stand patient  times a day  - Ambulate patient  times a day  - Out of bed to chair times a day   - Out of bed for meals  times a day  - Out of bed for toileting  - Record patient progress and toleration of activity level   Outcome: Progressing     Problem: MOBILITY - ADULT  Goal: Maintains/Returns to pre admission functional level  Description: INTERVENTIONS:  - Perform BMAT or MOVE assessment daily    - Set and communicate daily mobility goal to care team and patient/family/caregiver     - Collaborate with rehabilitation services on mobility goals if consulted  - Perform Range of Motion times a day  - Reposition patient every  hours    - Dangle patient times a day  - Stand patient  times a day  - Ambulate patient  times a day  - Out of bed to chair times a day   - Out of bed for meals times a day  - Out of bed for toileting  - Record patient progress and toleration of activity level   Outcome: Progressing

## 2022-04-07 NOTE — ASSESSMENT & PLAN NOTE
· Troponin 62-->62-->55  · Was given aspirin 325 in ED for EKG showing possible new T-wave inversions in 2 3 AVF and V5 V6;   · No chest pain prior to admission and remains w/o chest pain  · Slightly elevated troponin in the setting of ESRD; doubt troponin elevation due to MI  · Patient has an appointment at 3:00 p m  today with his outpatient cardiologist (Dr Claudy He)  · Outpatient NM pharmacologic stress test ordered  · F/u w o/p Cardiologist this afternoon

## 2022-04-07 NOTE — H&P
[facility]  H&P- Godwin Raymond 1960, 58 y o  male MRN: [id number]  Unit/Bed#: S -01 Encounter: [id number]  Primary Care Provider: Afshin Roblero DO   Date and time admitted to hospital: 4/6/2022  6:29 PM    * Dizziness  Assessment & Plan  · Noted today right after dialysis  · CT head unremarkable  · Neuro exam nonfocal  · Dizziness has improved with meclizine  · Etiology unclear; patient also recently started Zoloft and stopped it a few days ago (could be contributing to dizziness as well)  · Obtain orthostatic blood pressure  · Monitor on telemetry  · P r n  Meclizine    Elevated troponin  Assessment & Plan  · Troponin x2 67, delta 0  · EKG showed new T-wave inversions in 2 3 AVF and V5 V6; was aspirin 325 in ED  · No chest pain prior to admission and is also currently chest pain-free  · Slightly elevated troponin in the setting of ESRD; doubt troponin elevation due to MI  · Patient has an appointment tomorrow at 3:00 p m  with his outpatient cardiologist (Dr Ximena Krishnamurthy)  · Consider outpatient pharmacologic stress test    ESRD on dialysis [facility])  Assessment & Plan  Lab Results   Component Value Date    EGFR 9 04/06/2022    EGFR 6 01/03/2022    EGFR 7 01/01/2022    CREATININE 5 79 (H) 04/06/2022    CREATININE 8 22 (H) 01/03/2022    CREATININE 6 78 (H) 01/01/2022   · HD at [facility]    Type 2 diabetes mellitus [facility])  Assessment & Plan  Lab Results   Component Value Date    HGBA1C 6 4 12/09/2021       Recent Labs     04/06/22  1843 04/06/22  2148   POCGLU 245* 252*       Blood Sugar Average: Last 72 hrs:  (P) 248 5   · Home regimen:  Lantus 14 units in a m ; 4 units Humalog t i d   With meals  · SSI  · Q i d  Accu-Cheks  · Hypoglycemia protocol  · Carb controlled diet    Mixed hyperlipidemia  Assessment & Plan  · Continue atorvastatin    History of stroke  Assessment & Plan  · History of left MCA CVA in 2018  · Date is noted to be wide based but per wife is chronic post stroke    Hypertension  Assessment & Plan  · Continue home carvedilol and torsemide  · Takes carvedilol 6 25 mg twice a day  · Torsemide 10 mg Monday Wednesday and Friday after dialysis and 50 mg OD all other days    Anemia  Assessment & Plan  · Hemoglobin 11 3 - within baseline  · Likely secondary to CKD  · No active bleeding    VTE Pharmacologic Prophylaxis: VTE Score: 3 Moderate Risk (Score 3-4) - Pharmacological DVT Prophylaxis Ordered: heparin  Code Status: Level 1 - Full Code   Discussion with family: Updated  (wife) at bedside  Anticipated Length of Stay: Patient will be admitted on an observation basis with an anticipated length of stay of less than 2 midnights secondary to Dizziness and new EKG findings  Chief Complaint:  Dizziness    History of Present Illness:  Kellie Herrera is a 58 y o  male with a PMH of ESRD on dialysis, hypertension, dyslipidemia, stroke in 2018, type 2 diabetes on insulin, who presents with dizziness  Patient was getting his dialysis earlier today and thereafter had sudden onset of dizziness  Due to persistence of symptoms patient was taken to the hospital and was subsequently admitted  No tinnitus, hearing loss, or new gait instability  No nausea or vomiting, abdominal pain, diarrhea  CT head in ED unremarkable  Patient had no complaints of chest pain, chest tightness or shortness of breath  EKG in ED showed new T-wave inversions in 2 3 AVF and V5 V6  Troponins slightly elevated however delta 0  Will monitor on telemetry overnight  Of note, patient recently started sertraline and discontinued it a few days ago  No other new medications  No history of MI  Review of Systems:  Review of Systems   Constitutional: Negative for chills and fever  HENT: Negative for congestion and trouble swallowing  Respiratory: Negative for cough, chest tightness and shortness of breath      Cardiovascular: Negative for chest pain, palpitations and leg swelling  Gastrointestinal: Negative for abdominal pain, constipation, nausea and vomiting  Genitourinary: Negative for dysuria and hematuria  Musculoskeletal: Negative for back pain  Skin: Negative for pallor  Neurological: Positive for dizziness  Negative for tremors, seizures, speech difficulty, weakness, light-headedness and headaches  Psychiatric/Behavioral: Negative for agitation and confusion  All other systems reviewed and are negative  Past Medical and Surgical History:   Past Medical History:   Diagnosis Date    Cerebrovascular accident (CVA) due to thrombosis of left middle cerebral artery ([facility] ) 7/29/2018    Chronic kidney disease     Diabetes mellitus ([facility] )     GERD (gastroesophageal reflux disease)     Hypercholesteremia     Hyperlipidemia     Hypertension     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    Neuropathy     Obesity     Osteomyelitis ([place] [address] )     last assessed 11/4/16    PVC's (premature ventricular contractions)     sees cardiology Dr Alvina camargo    Stroke [facility])     last weeof July 2018 [address] [facility]    TIA (transient ischemic attack) 10/28/2018       Past Surgical History:   Procedure Laterality Date    ABDOMINAL SURGERY      CHOLECYSTECTOMY      Percutaneous    COLONOSCOPY      CYSTOSCOPY      OTHER SURGICAL HISTORY      "stimulator to control bowel movements"    NM ESOPHAGOGASTRODUODENOSCOPY TRANSORAL DIAGNOSTIC N/A 9/27/2016    Procedure: ESOPHAGOGASTRODUODENOSCOPY (EGD); Surgeon: Trinity Gaviria MD;  [facility];   Service: Gastroenterology    NM LAP,CHOLECYSTECTOMY N/A 2/29/2016    Procedure: LAPAROSCOPIC CHOLECYSTECTOMY ;  Surgeon: Franklin Arredondo DO;  Location: [facility];  Service: General    ROTATOR CUFF REPAIR Right     TOE AMPUTATION Right 10/28/2016    Procedure: 3RD TOE AMPUTATION ;  Surgeon: Tete Mosqueda DPM;  [facility];  Service:        Meds/Allergies:  Prior to Admission medications Medication Sig Start Date End Date Taking? Authorizing Provider   aspirin (ECOTRIN LOW STRENGTH) 81 mg EC tablet Take 81 mg by mouth daily Resume on 8/14     Yes Historical Provider, MD   atorvastatin (LIPITOR) 40 mg tablet Take 1 tablet (40 mg total) by mouth daily with dinner 12/13/21  Yes Giacomo Hastings DO   BD Pen Needle Adina U/F 32G X 4 MM MISC USE TO INJECT INSULIN 4 TIMES DAILY 5/19/21  Yes Lissette Brennan PA-C   Blood Glucose Monitoring Suppl (True Metrix Meter) w/Device KIT Use to test blood sugars 3 times daily 3/28/22  Yes Lissette Brennan PA-C   Blood Pressure Monitoring (BLOOD PRESSURE CUFF) MISC Use to check blood pressure before taking blood pressure medication and 1 hour after and follow instructions provided in discharge instructions based on the readings  8/13/18  Yes Bin Montez MD   calcium acetate (CALPHRON) 667 mg TAKE 2 TABLETS BY MOUTH THREE TIMES DAILY WITH MEALS 10/17/21  Yes Historical Provider, MD   carvedilol (COREG) 6 25 mg tablet Take 1 tablet (6 25 mg total) by mouth 2 (two) times a day with meals Or as directed by your kidney doctor 2/3/22  Yes Giacomo Hastings DO   Cholecalciferol (Vitamin D3) 1 25 MG (75622 UT) CAPS TAKE 1 CAPSULE BY MOUTH ONE TIME PER WEEK 10/6/21  Yes Lashanda Paz MD   Continuous Blood Gluc  (DEXCOM G6 ) LANCE Use as directed for continuous glucose monitoring 10/28/19  Yes Lissette Brennan PA-C   Continuous Blood Gluc Sensor (DEXCOM G6 SENSOR) MISC Use as directed for continuous glucose monitoring   Change every 10 days 10/28/19  Yes Lissette Brennan PA-C   Continuous Blood Gluc Transmit (DEXCOM G6 TRANSMITTER) MISC Use as directed for continuous glucose monitoring-Change every 3 months 10/28/19  Yes Lissette Brennan PA-C   doxercalciferol (HECTOROL) 0 5 mcg capsule 4 mcg 11/10/21 11/9/22 Yes Historical Provider, MD   glucose blood (True Metrix Blood Glucose Test) test strip Use 1 each 3 (three) times a day Use as instructed 3/28/22  Yes Lissette Brennan PA-C heparin 1000 units in sterile water 3000 mL irrigation bag Heparin Sodium (Porcine) 1,000 Units/mL Systemic 10/13/21 10/12/22 Yes Historical Provider, MD   Incontinence Supplies (MALE URINAL) MISC by Does not apply route daily 9/24/18  Yes Delos Collet, DO   insulin lispro (HumaLOG KwikPen) 100 units/mL injection pen INJECT 4 UNITS 3 TIMES A DAY WITH MEALS PLUS SCALE 4/5/22  Yes Lisa Paige MD   Insulin Syringe-Needle U-100 (B-D INS SYRINGE 0 5CC/30GX1/2") 30G X 1/2" 0 5 ML MISC Inject under the skin 4 (four) times a day 6/5/20  Yes Lisa Paige MD   Lancets Ultra Thin 30G MISC Use 3 times a day 3/28/22  Yes Lissette Brennan PA-C   Lantus 100 UNIT/ML subcutaneous injection INJECT 14 UNITS UNDER THE SKIN DAILY 10/20/21  Yes Lissette Brennan PA-C   Nutritional Supplements (VITAMIN D BOOSTER PO) Take 0 5 mcg by mouth   6/21/21 6/20/22 Yes Historical Provider, MD   Pasqual Sera LANCETS 18L [facility] by Does not apply route 3 (three) times a day 11/27/18  Yes Delos Collet, DO   TORSEMIDE PO Take 50 mg by mouth   10/25/21  Yes Historical Provider, MD   ammonium lactate (LAC-HYDRIN) 12 % cream Apply topically as needed for dry skin  Patient not taking: Reported on 4/6/2022 2/8/22   Luis A Dwyer DPM   diphenhydrAMINE (BENADRYL) 25 mg tablet Take 1 tablet (25 mg total) by mouth every 6 (six) hours as needed for itching for up to 2 days 1/30/22 2/1/22  Parth Kiran PA-C   iron sucrose (VENOFER) 50 mg Once every 2 weeks  Patient not taking: Reported on 1/19/2022 7/28/21 7/27/22  Historical Provider, MD   sertraline (Zoloft) 50 mg tablet Take 1/2 tablet every day at bedtime for 4 days, then increase to 1 tablet every day at bedtime  Patient not taking: Reported on 4/6/2022  3/17/22   Delos Collet, DO     I have reviewed home medications with patient family member      Allergies: No Known Allergies      Social History     Substance and Sexual Activity   Alcohol Use Not Currently     Social History     Tobacco Use   Smoking Status Never Smoker   Smokeless Tobacco Never Used     Social History     Substance and Sexual Activity   Drug Use No       Family History:  Family History   Problem Relation Age of Onset    Leukemia Mother     Liver disease Mother     Lung cancer Mother         heavy smoker - 3 ppd    Heart disease Father     Liver disease Father     Multiple myeloma Sister     Breast cancer Sister     Urolithiasis Family     Alcohol abuse Neg Hx     Depression Neg Hx     Drug abuse Neg Hx     Substance Abuse Neg Hx     Mental illness Neg Hx        Physical Exam:     Vitals:   Blood Pressure: 117/59 (04/06/22 2250)  Pulse: 80 (04/06/22 2247)  Temperature: 98 2 °F (36 8 °C) (04/06/22 2247)  Temp Source: Oral (04/06/22 2247)  Respirations: 18 (04/06/22 2247)  Height: 5' 9" (175 3 cm) (04/06/22 1827)  Weight - Scale: 108 kg (239 lb) (04/06/22 1827)  SpO2: 98 % (04/06/22 2247)    Physical Exam  Vitals reviewed  Constitutional:       General: He is not in acute distress  Appearance: He is not ill-appearing  Eyes:      General: No scleral icterus  Cardiovascular:      Rate and Rhythm: Normal rate  Heart sounds: No murmur heard  Pulmonary:      Effort: Pulmonary effort is normal  No respiratory distress  Breath sounds: No wheezing or rales  Abdominal:      General: Bowel sounds are normal       Palpations: Abdomen is soft  Tenderness: There is no abdominal tenderness  Musculoskeletal:      Right lower leg: No edema  Left lower leg: No edema  Skin:     General: Skin is warm and dry  Capillary Refill: Capillary refill takes less than 2 seconds  Coloration: Skin is not jaundiced  Findings: Erythema:      Neurological:      General: No focal deficit present  Mental Status: He is alert and oriented to person, place, and time  Mental status is at baseline  Cranial Nerves: No cranial nerve deficit  Sensory: No sensory deficit  Motor: No weakness  Psychiatric:         Mood and Affect: Mood normal             Additional Data:     Lab Results:  Results from last 7 days   Lab Units 04/06/22  1915   WBC Thousand/uL 5 82   HEMOGLOBIN g/dL 11 3*   HEMATOCRIT % 35 0*   PLATELETS Thousands/uL 152   NEUTROS PCT % 70   LYMPHS PCT % 19   MONOS PCT % 9   EOS PCT % 1     Results from last 7 days   Lab Units 04/06/22  1915   SODIUM mmol/L 139   POTASSIUM mmol/L 4 4   CHLORIDE mmol/L 96*   CO2 mmol/L 34*   BUN mg/dL 37*   CREATININE mg/dL 5 79*   ANION GAP mmol/L 9   CALCIUM mg/dL 8 8   ALBUMIN g/dL 3 6   TOTAL BILIRUBIN mg/dL 0 45   ALK PHOS U/L 89   ALT U/L 22   AST U/L 14   GLUCOSE RANDOM mg/dL 246*     Results from last 7 days   Lab Units 04/06/22  1915   INR  1 01     Results from last 7 days   Lab Units 04/06/22  2148 04/06/22  1843   POC GLUCOSE mg/dl 252* 245*               Imaging: Reviewed radiology reports from this admission including: CT head  CT head without contrast   Final Result by Brenda Cohen MD (04/06 1934)      No acute intracranial pathology  Stable chronic ischemic changes  Workstation performed: [id number]             EKG and Other Studies Reviewed on Admission:   · EKG: NSR  HR 80  T-wave inversion in 2 3 AVF and V4 V5    ** Please Note: This note has been constructed using a voice recognition system   **

## 2022-04-07 NOTE — ASSESSMENT & PLAN NOTE
· History of left MCA CVA in 2018  · Date is noted to be wide based but per wife is chronic post stroke

## 2022-04-07 NOTE — DISCHARGE SUMMARY
[facility]  Discharge- Patt Gottlieb 1960, 58 y o  male MRN: [id number]  Unit/Bed#: S -01 Encounter: [id number]  Primary Care Provider: Milton Cortez DO   Date and time admitted to hospital: 4/6/2022  6:29 PM    * Dizziness  Assessment & Plan  · Noted today right after dialysis  · CT head unremarkable  · Neuro exam nonfocal  · Dizziness has improved with meclizine  · Etiology unclear; Consider hypotension following dialysis +/- SSRI withdrawal -- patient started Zoloft for 1 week before abruptly self-discontinuing three days prior to presentation; sxs and timing consistent w SSRI withdrawal  · VS negative for orthostatic hypotension, though nearly positive and no supine pressure recorded for complete evaluation -  to 117 from sitting to standing respectively  · P r n   Meclizine  · Pt feels clinically well today, no complaints, reports dizziness has resolved    Elevated troponin  Assessment & Plan  · Troponin 62-->62-->55  · Was given aspirin 325 in ED for EKG showing possible new T-wave inversions in 2 3 AVF and V5 V6;   · No chest pain prior to admission and remains w/o chest pain  · Slightly elevated troponin in the setting of ESRD; doubt troponin elevation due to MI  · Patient has an appointment at 3:00 p m  today with his outpatient cardiologist (Dr Lindsey Mendez)  · Outpatient NM pharmacologic stress test ordered  · F/u w o/p Cardiologist this afternoon    Hypertension  Assessment & Plan  · Continue home carvedilol and torsemide  · Takes carvedilol 6 25 mg twice a day  · Torsemide 10 mg Monday Wednesday and Friday after dialysis and 50 mg QD all other days    ESRD on dialysis [facility])  Assessment & Plan  Lab Results   Component Value Date    EGFR 9 04/06/2022    EGFR 6 01/03/2022    EGFR 7 01/01/2022    CREATININE 5 79 (H) 04/06/2022    CREATININE 8 22 (H) 01/03/2022    CREATININE 6 78 (H) 01/01/2022   · HD at [facility]    Anemia  Assessment & Plan  · Hemoglobin 11 3 - within baseline  · Likely secondary to CKD  · No active bleeding    History of stroke  Assessment & Plan  · History of left MCA CVA in 2018  · Gait is noted to be wide based but per wife is chronic post stroke    Mixed hyperlipidemia  Assessment & Plan  · Continue atorvastatin    Type 2 diabetes mellitus [facility])  Assessment & Plan  Lab Results   Component Value Date    HGBA1C 6 4 12/09/2021       Recent Labs     04/06/22  1843 04/06/22  2148   POCGLU 245* 252*       Blood Sugar Average: Last 72 hrs:  (P) 248  5   · Resume home regimen  · Carb controlled diet      Medical Problems             Resolved Problems  Date Reviewed: 4/7/2022    None              Discharging Resident: Bev Caro MD  Discharging Attending: No att  providers found  PCP: Scarlet Barahona,   Admission Date:   Admission Orders (From admission, onward)     Ordered        04/06/22 2055  Place in Observation  Once                      Discharge Date: 04/07/22    Consultations During Hospital Stay:  · None    Procedures Performed:   · None    Significant Findings / Test Results:   · CT head w/o acute abnormalities  · Minimally elevated troponin with negative delta    Incidental Findings:   · None     Test Results Pending at Discharge (will require follow up): · None     Outpatient Tests Requested:  · NM stress test    Complications:  None    Reason for Admission: Dizziness    Hospital Course:   Koby Esteban is a 58 y o  male patient who originally presented to the hospital on 4/6/2022 due to dizziness following HD and three-day cessation of SSRI  Found to have minimally elevated troponin, trended and with negative delta, no chest pain  EKG with indeterminate ST depressions  Dizziness improved with meclizine, resolved by Hospital Day 2  O/p NM stress test ordered  Pt is medically stable for discharge to home and will keep o/p Cardiology appointment for later this afternoon, 15:00 4/7/22      Please see above list of diagnoses and related plan for additional information  Condition at Discharge: good    Discharge Day Visit / Exam:   Subjective: This morning, Mr Katherine Lopez is seen sitting in bedside chair, alert, engaged, in no acute distress  He has no complaints at this time  Reports that dizziness has resolved  Denies CP, palpitations, lightheadedness, shortness of breath  Brief ROS negative  Pt is eager for discharge from hospital, and would like to keep o/p appointment scheduled for later today  Vitals: Blood Pressure: 132/72 (04/07/22 0803)  Pulse: 87 (04/07/22 0803)  Temperature: 98 3 °F (36 8 °C) (04/07/22 0803)  Temp Source: Oral (04/07/22 0803)  Respirations: 18 (04/07/22 0803)  Height: 5' 9" (175 3 cm) (04/06/22 1827)  Weight - Scale: 108 kg (239 lb) (04/06/22 1827)  SpO2: 97 % (04/07/22 0803)  Exam:   Physical Exam  Constitutional:       General: He is not in acute distress  Appearance: Normal appearance  He is obese  He is not ill-appearing, toxic-appearing or diaphoretic  HENT:      Head: Normocephalic and atraumatic  Eyes:      General: No scleral icterus  Right eye: No discharge  Left eye: No discharge  Extraocular Movements: Extraocular movements intact  Pupils: Pupils are equal, round, and reactive to light  Cardiovascular:      Rate and Rhythm: Normal rate and regular rhythm  Pulses: Normal pulses  Heart sounds: Normal heart sounds  No murmur heard  No friction rub  No gallop  Pulmonary:      Effort: Pulmonary effort is normal  No respiratory distress  Breath sounds: Normal breath sounds  No stridor  No wheezing, rhonchi or rales  Chest:      Chest wall: No tenderness  Abdominal:      General: Bowel sounds are normal  There is no distension  Palpations: Abdomen is soft  Tenderness: There is no abdominal tenderness  There is no guarding or rebound  Musculoskeletal:         General: No swelling or tenderness  Right lower leg: No edema  Left lower leg: No edema  Skin:     General: Skin is warm and dry  Neurological:      Mental Status: He is alert and oriented to person, place, and time  Coordination: Coordination normal    Psychiatric:         Mood and Affect: Mood normal          Behavior: Behavior normal         Discussion with Family: Updated  (son) at bedside  Discharge instructions/Information to patient and family:   See after visit summary for information provided to patient and family  Provisions for Follow-Up Care:  See after visit summary for information related to follow-up care and any pertinent home health orders  Disposition:   Home    Planned Readmission: None    Discharge Medications:  See after visit summary for reconciled discharge medications provided to patient and/or family        **Please Note: This note may have been constructed using a voice recognition system**

## 2022-04-07 NOTE — ASSESSMENT & PLAN NOTE
Lab Results   Component Value Date    EGFR 9 04/06/2022    EGFR 6 01/03/2022    EGFR 7 01/01/2022    CREATININE 5 79 (H) 04/06/2022    CREATININE 8 22 (H) 01/03/2022    CREATININE 6 78 (H) 01/01/2022   · HD at [facility]

## 2022-04-07 NOTE — TELEPHONE ENCOUNTER
Called pharmacy, they are saying it can't go through until the 13th  Pharmacy asked to send it again with the max daily dose to see if they can get it to go through sooner

## 2022-04-07 NOTE — ASSESSMENT & PLAN NOTE
· Noted today right after dialysis  · CT head unremarkable  · Neuro exam nonfocal  · Dizziness has improved with meclizine  · Etiology unclear; patient also recently started Zoloft and stopped it a few days ago (could be contributing to dizziness as well)  · Obtain orthostatic blood pressure  · Monitor on telemetry  · P r n   Meclizine

## 2022-04-07 NOTE — ASSESSMENT & PLAN NOTE
· Troponin x2 67, delta 0  · EKG showed new T-wave inversions in 2 3 AVF and V5 V6; was aspirin 325 in ED  · No chest pain prior to admission and is also currently chest pain-free  · Slightly elevated troponin in the setting of ESRD; doubt troponin elevation due to MI  · Patient has an appointment tomorrow at 3:00 p m  with his outpatient cardiologist (Dr Carrie Daniel)  · Consider outpatient pharmacologic stress test

## 2022-04-07 NOTE — ASSESSMENT & PLAN NOTE
· Continue home carvedilol and torsemide  · Takes carvedilol 6 25 mg twice a day  · Torsemide 10 mg Monday Wednesday and Friday after dialysis and 50 mg QD all other days

## 2022-04-07 NOTE — DISCHARGE INSTR - AVS FIRST PAGE
Dear Keya Moore,     It was our pleasure to care for you here at [facility]  It is our hope that we were always able to exceed the expected standards for your care during your stay  You were hospitalized due to dizziness  You were cared for on the 3rd floor by North Luna MD, under the service of Ancelmo Clifton MD with the [facility] who covers for your primary care physician (PCP), Tahmina Goins DO, while you were hospitalized  If you have any questions or concerns related to this hospitalization, you may contact us at [phone]  For follow up as well as any medication refills, we recommend that you follow up with your primary care physician  A registered nurse will reach out to you by phone within a few days after your discharge to answer any additional questions that you may have after going home  However, at this time we provide for you here, the most important instructions / recommendations at discharge:     Notable Medication Adjustments -   Please continue to take meclizine as needed for dizziness  Testing Required after Discharge -   Nuclear stress test  Please follow up with your PCP for the result  Important follow up information -   Please follow up with your PCP about zoloft taper and stress test result  Other Instructions -   None  Please review this entire after visit summary as additional general instructions including medication list, appointments, activity, diet, any pertinent wound care, and other additional recommendations from your care team that may be provided for you        Sincerely,     North Luna MD

## 2022-04-07 NOTE — ASSESSMENT & PLAN NOTE
· Continue home carvedilol and torsemide  · Takes carvedilol 6 25 mg twice a day  · Torsemide 10 mg Monday Wednesday and Friday after dialysis and 50 mg OD all other days

## 2022-04-07 NOTE — ASSESSMENT & PLAN NOTE
· History of left MCA CVA in 2018  · Gait is noted to be wide based but per wife is chronic post stroke

## 2022-04-07 NOTE — ASSESSMENT & PLAN NOTE
Lab Results   Component Value Date    HGBA1C 6 4 12/09/2021       Recent Labs     04/06/22  1843 04/06/22  2148   POCGLU 245* 252*       Blood Sugar Average: Last 72 hrs:  (P) 248  5   · Resume home regimen  · Carb controlled diet

## 2022-04-08 ENCOUNTER — TRANSITIONAL CARE MANAGEMENT (OUTPATIENT)
Dept: INTERNAL MEDICINE CLINIC | Facility: CLINIC | Age: 62
End: 2022-04-08

## 2022-04-08 LAB
ATRIAL RATE: 76 BPM
ATRIAL RATE: 79 BPM
ATRIAL RATE: 82 BPM
P AXIS: 38 DEGREES
P AXIS: 44 DEGREES
P AXIS: 56 DEGREES
PR INTERVAL: 148 MS
PR INTERVAL: 154 MS
PR INTERVAL: 166 MS
QRS AXIS: -20 DEGREES
QRS AXIS: -56 DEGREES
QRS AXIS: -59 DEGREES
QRSD INTERVAL: 152 MS
QRSD INTERVAL: 154 MS
QRSD INTERVAL: 156 MS
QT INTERVAL: 420 MS
QT INTERVAL: 432 MS
QT INTERVAL: 464 MS
QTC INTERVAL: 490 MS
QTC INTERVAL: 495 MS
QTC INTERVAL: 522 MS
T WAVE AXIS: -14 DEGREES
T WAVE AXIS: -58 DEGREES
T WAVE AXIS: -67 DEGREES
VENTRICULAR RATE: 76 BPM
VENTRICULAR RATE: 79 BPM
VENTRICULAR RATE: 82 BPM

## 2022-04-08 PROCEDURE — 93010 ELECTROCARDIOGRAM REPORT: CPT | Performed by: INTERNAL MEDICINE

## 2022-04-12 ENCOUNTER — TELEMEDICINE (OUTPATIENT)
Dept: INTERNAL MEDICINE CLINIC | Facility: CLINIC | Age: 62
End: 2022-04-12
Payer: MEDICARE

## 2022-04-12 ENCOUNTER — TELEPHONE (OUTPATIENT)
Dept: INTERNAL MEDICINE CLINIC | Facility: CLINIC | Age: 62
End: 2022-04-12

## 2022-04-12 DIAGNOSIS — N18.6 ESRD ON DIALYSIS (HCC): ICD-10-CM

## 2022-04-12 DIAGNOSIS — Z79.4 TYPE 2 DIABETES MELLITUS WITH HYPERGLYCEMIA, WITH LONG-TERM CURRENT USE OF INSULIN (HCC): ICD-10-CM

## 2022-04-12 DIAGNOSIS — Z99.2 ESRD ON DIALYSIS (HCC): ICD-10-CM

## 2022-04-12 DIAGNOSIS — E11.65 TYPE 2 DIABETES MELLITUS WITH HYPERGLYCEMIA, WITH LONG-TERM CURRENT USE OF INSULIN (HCC): ICD-10-CM

## 2022-04-12 DIAGNOSIS — Z09 HOSPITAL DISCHARGE FOLLOW-UP: Primary | ICD-10-CM

## 2022-04-12 DIAGNOSIS — F41.8 ANXIETY ASSOCIATED WITH DEPRESSION: Primary | ICD-10-CM

## 2022-04-12 DIAGNOSIS — R42 DIZZINESS: ICD-10-CM

## 2022-04-12 DIAGNOSIS — F41.8 ANXIETY ASSOCIATED WITH DEPRESSION: ICD-10-CM

## 2022-04-12 PROCEDURE — 99495 TRANSJ CARE MGMT MOD F2F 14D: CPT | Performed by: INTERNAL MEDICINE

## 2022-04-12 RX ORDER — CALCIUM CITRATE/VITAMIN D3 200MG-6.25
1 TABLET ORAL 3 TIMES DAILY
Qty: 100 EACH | Refills: 0 | Status: SHIPPED | OUTPATIENT
Start: 2022-04-12 | End: 2022-05-12

## 2022-04-12 NOTE — PROGRESS NOTES
Virtual TCM Visit:    Verification of patient location:    Patient is located in the following state in which I hold an active license [place]        Assessment/Plan:        Problem List Items Addressed This Visit     Anxiety associated with depression    Relevant Orders    Ambulatory Referral to Psychiatry    Dizziness    ESRD on dialysis [facility])      Other Visit Diagnoses     Hospital discharge follow-up    -  Primary             Reason for visit is for follow up, virtual(video) visit due to ongoing COVID-19 pandemic  Encounter provider Nelda Ho DO       Provider located at [address] [place] [address]  [address]ia [address]  [phone]      Recent Visits  No visits were found meeting these conditions  Showing recent visits within past 7 days and meeting all other requirements  Today's Visits  Date Type Provider Dept   04/12/22 Telephone [address] Internal Med   04/12/22 Telemedicine Nelda Ho, Louise[address] Fagan [facility] Internal Med   Showing today's visits and meeting all other requirements  Future Appointments  No visits were found meeting these conditions  Showing future appointments within next 150 days and meeting all other requirements       After connecting through [employer], the patient was identified by name and date of birth  Nuha Ketan was informed that this is a telemedicine visit and that the visit is being conducted through [address] [place] [address] Now and patient was informed that this is a secure, HIPAA-compliant platform  He agrees to proceed     My office door was closed  No one else was in the room  He acknowledged consent and understanding of privacy and security of the video platform  The patient has agreed to participate and understands they can discontinue the visit at any time  Patient is aware this is a billable service  Subjective:     Patient ID: Nuha Aceves is a 58 y o  male     HPI     For hospital discharge follow up, Anupam Macias is present during video visit with his son, Immanuel Hartman  He felt dizzy and unwell and went to the hospital and was admitted for work up  Work up revealed an abnormal troponin and possible hypotension/vertigo and he was discharged to complete an outpatient stress test and with meclizine  He is ESRD on dialysis  He is feeling a bit better from dizziness now and was taking sertraline for anxiety/depression but stopped as he did not like the way it made him feel  He is still feeling anxious and stressed  He failed SSRI and another rx(lexapro) which he could not take due to being on dialysis  He would like to see psychiatry and his son agrees with this  ROS Otherwise negative, no other complaints  Review of Systems   Constitutional: Negative for fever  HENT: Negative for congestion  Respiratory: Negative for shortness of breath  Cardiovascular: Negative for chest pain  Gastrointestinal: Negative for abdominal pain  Psychiatric/Behavioral: Positive for dysphoric mood  Negative for suicidal ideas  The patient is nervous/anxious  Objective: There were no vitals filed for this visit  Physical Exam  Constitutional:       Appearance: Normal appearance  Eyes:      Conjunctiva/sclera: Conjunctivae normal    Pulmonary:      Effort: Pulmonary effort is normal  No respiratory distress  Neurological:      Mental Status: He is alert  Psychiatric:         Mood and Affect: Mood is anxious  Behavior: Behavior normal              Transitional Care Management Review:  Anh Adorno is a 58 y o  male here for TCM follow up       During the TCM phone call patient stated:    TCM Call (since 3/12/2022)     Date and time call was made  4/8/2022 10:10 AM    Hospital care reviewed  Records reviewed        Patient was hospitialized at  [address] [place] [address]        Date of Admission  04/06/22    Date of discharge  04/07/22    Diagnosis DIZZINESS    Disposition  Home      TCM Call (since 3/12/2022)     Scheduled for follow up? Yes    I have advised the patient to call PCP with any new or worsening symptoms  MAILE UGARTE MA     Living Arrangements  Spouse or Significiant other    Counseling  Family          I spent 15 minutes with the patient during this visit  Billy Babinski, DO      VIRTUAL VISIT [facility] verbally agrees to participate in [employer]  Pt is aware that [employer] could be limited without vital signs or the ability to perform a full hands-on physical exam  Asad Gray understands he or the provider may request at any time to terminate the video visit and request the patient to seek care or treatment in person

## 2022-04-12 NOTE — TELEPHONE ENCOUNTER
Ref entered for Brendanne Massing    Please schedule Ju [facility]arez at [facility] or at our office   whichever will get him in sooner    thanks

## 2022-04-12 NOTE — TELEPHONE ENCOUNTER
PT'S SON CALLED, SAID THAT YOU REFERRED DAD TO PSYCHIATRY BUT DAD IS NOT INTERESTED IN TAKING MEDICATIONS, HE'S JUST LOOKING FOR SOMEONE TO TALK TO     THEY'D LIKE A REFERRAL FOR A THERAPIST

## 2022-04-12 NOTE — TELEPHONE ENCOUNTER
SPOKE WITH LINDA, GAVE MESSAGE, AND SCHEDULED AT [facility] PER SON'S REQUEST

## 2022-04-14 ENCOUNTER — TELEPHONE (OUTPATIENT)
Dept: CARDIOLOGY CLINIC | Facility: CLINIC | Age: 62
End: 2022-04-14

## 2022-04-14 ENCOUNTER — HOSPITAL ENCOUNTER (OUTPATIENT)
Dept: NON INVASIVE DIAGNOSTICS | Facility: CLINIC | Age: 62
Discharge: HOME | End: 2022-04-14
Payer: MEDICARE

## 2022-04-14 ENCOUNTER — TELEPHONE (OUTPATIENT)
Dept: ENDOCRINOLOGY | Facility: CLINIC | Age: 62
End: 2022-04-14

## 2022-04-14 ENCOUNTER — OFFICE VISIT (OUTPATIENT)
Dept: PODIATRY | Facility: CLINIC | Age: 62
End: 2022-04-14
Payer: MEDICARE

## 2022-04-14 VITALS
DIASTOLIC BLOOD PRESSURE: 84 MMHG | OXYGEN SATURATION: 98 % | HEART RATE: 79 BPM | SYSTOLIC BLOOD PRESSURE: 130 MMHG | WEIGHT: 239 LBS | HEIGHT: 69 IN | BODY MASS INDEX: 35.4 KG/M2

## 2022-04-14 VITALS
BODY MASS INDEX: 35.4 KG/M2 | HEART RATE: 89 BPM | SYSTOLIC BLOOD PRESSURE: 126 MMHG | HEIGHT: 69 IN | WEIGHT: 239 LBS | DIASTOLIC BLOOD PRESSURE: 75 MMHG

## 2022-04-14 DIAGNOSIS — R42 DIZZINESS: ICD-10-CM

## 2022-04-14 DIAGNOSIS — Z79.4 TYPE 2 DIABETES MELLITUS WITH HYPERGLYCEMIA, WITH LONG-TERM CURRENT USE OF INSULIN (HCC): ICD-10-CM

## 2022-04-14 DIAGNOSIS — S92.415A CLOSED NONDISPLACED FRACTURE OF PROXIMAL PHALANX OF LEFT GREAT TOE, INITIAL ENCOUNTER: ICD-10-CM

## 2022-04-14 DIAGNOSIS — Z86.73 HISTORY OF STROKE: ICD-10-CM

## 2022-04-14 DIAGNOSIS — Z89.421 ABSENCE OF TOE OF RIGHT FOOT (HCC): ICD-10-CM

## 2022-04-14 DIAGNOSIS — I12.0 BENIGN HYPERTENSION WITH CHRONIC KIDNEY DISEASE, STAGE V (HCC): ICD-10-CM

## 2022-04-14 DIAGNOSIS — N18.5 BENIGN HYPERTENSION WITH CHRONIC KIDNEY DISEASE, STAGE V (HCC): ICD-10-CM

## 2022-04-14 DIAGNOSIS — N18.6 ESRD ON DIALYSIS (HCC): ICD-10-CM

## 2022-04-14 DIAGNOSIS — B35.1 ONYCHOMYCOSIS: ICD-10-CM

## 2022-04-14 DIAGNOSIS — R06.02 SHORTNESS OF BREATH: ICD-10-CM

## 2022-04-14 DIAGNOSIS — E11.42 DIABETIC POLYNEUROPATHY ASSOCIATED WITH TYPE 2 DIABETES MELLITUS (HCC): ICD-10-CM

## 2022-04-14 DIAGNOSIS — S99.922A INJURY OF LEFT GREAT TOE, INITIAL ENCOUNTER: Primary | ICD-10-CM

## 2022-04-14 DIAGNOSIS — E11.65 TYPE 2 DIABETES MELLITUS WITH HYPERGLYCEMIA, WITH LONG-TERM CURRENT USE OF INSULIN (HCC): ICD-10-CM

## 2022-04-14 DIAGNOSIS — Z99.2 ESRD ON DIALYSIS (HCC): ICD-10-CM

## 2022-04-14 DIAGNOSIS — L85.1 ACQUIRED KERATODERMA: ICD-10-CM

## 2022-04-14 DIAGNOSIS — E78.2 MIXED HYPERLIPIDEMIA: ICD-10-CM

## 2022-04-14 LAB
ARRHY DURING EX: NORMAL
CHEST PAIN STATEMENT: NORMAL
MAX DIASTOLIC BP: 68 MMHG
MAX HEART RATE: 89 BPM
MAX HR PERCENT: 56 %
MAX PREDICTED HEART RATE: 158 BPM
MAX. SYSTOLIC BP: 140 MMHG
NUC STRESS EJECTION FRACTION: 46 %
PROTOCOL NAME: NORMAL
RATE PRESSURE PRODUCT: NORMAL
REASON FOR TERMINATION: NORMAL
SL CV REST NUCLEAR ISOTOPE DOSE: 10.76 MCI
SL CV STRESS NUCLEAR ISOTOPE DOSE: 32.6 MCI
SL CV STRESS RECOVERY BP: NORMAL MMHG
SL CV STRESS RECOVERY HR: 85 BPM
SL CV STRESS RECOVERY O2 SAT: 98 %
STRESS ANGINA INDEX: 0
STRESS BASELINE BP: NORMAL MMHG
STRESS BASELINE HR: 79 BPM
STRESS O2 SAT REST: 98 %
STRESS PEAK HR: 88 BPM
STRESS PERCENT HR: 56 %
STRESS POST ESTIMATED WORKLOAD: 1 METS
STRESS POST EXERCISE DUR MIN: 3 MIN
STRESS POST EXERCISE DUR SEC: 0 SEC
STRESS POST O2 SAT PEAK: 98 %
STRESS POST PEAK BP: 140 MMHG
STRESS TARGET HR: 89 BPM
STRESS/REST PERFUSION RATIO: 0.98
TARGET HR FORMULA: NORMAL
TEST INDICATION: NORMAL
TIME IN EXERCISE PHASE: NORMAL

## 2022-04-14 PROCEDURE — 93016 CV STRESS TEST SUPVJ ONLY: CPT | Performed by: INTERNAL MEDICINE

## 2022-04-14 PROCEDURE — 93017 CV STRESS TEST TRACING ONLY: CPT

## 2022-04-14 PROCEDURE — 11721 DEBRIDE NAIL 6 OR MORE: CPT | Performed by: PODIATRIST

## 2022-04-14 PROCEDURE — A9502 TC99M TETROFOSMIN: HCPCS

## 2022-04-14 PROCEDURE — 99213 OFFICE O/P EST LOW 20 MIN: CPT | Performed by: PODIATRIST

## 2022-04-14 PROCEDURE — 11056 PARNG/CUTG B9 HYPRKR LES 2-4: CPT | Performed by: PODIATRIST

## 2022-04-14 PROCEDURE — 93018 CV STRESS TEST I&R ONLY: CPT | Performed by: INTERNAL MEDICINE

## 2022-04-14 PROCEDURE — 78452 HT MUSCLE IMAGE SPECT MULT: CPT

## 2022-04-14 PROCEDURE — G1004 CDSM NDSC: HCPCS

## 2022-04-14 PROCEDURE — 78452 HT MUSCLE IMAGE SPECT MULT: CPT | Performed by: INTERNAL MEDICINE

## 2022-04-14 RX ADMIN — REGADENOSON 0.4 MG: 0.08 INJECTION, SOLUTION INTRAVENOUS at 09:37

## 2022-04-14 NOTE — TELEPHONE ENCOUNTER
----- Message from Wyatt Solorzano MD sent at 4/14/2022  4:14 PM EDT -----  Patient's stress test shows that he could have previously small heart attack but no active ischemia  They can keep an appointment if he has any symptoms of chest pain then he will need cardiac catheterization  For now will continue medical Rx

## 2022-04-14 NOTE — PROGRESS NOTES
PATIENT:  Emma Dowd    1960    ASSESSMENT:     1  Injury of left great toe, initial encounter  XR toe left great min 2 views   2  Closed nondisplaced fracture of proximal phalanx of left great toe, initial encounter     3  Diabetic polyneuropathy associated with type 2 diabetes mellitus ([facility] )     4  Absence of toe of right foot ([facility] )     5  Onychomycosis  Debridement   6  Acquired keratoderma  Debridement       PLAN:  1  Patient was counseled and educated on the condition and the diagnosis  2  X-ray was obtained and personally reviewed  The radiographic findings were discussed with the patient  3  The diagnosis, treatment options and prognosis were discussed with the patient  4  He has fracture in left great toe  No need for surgery  Som splint applied and instructed daily application  Surgical shoe for off-loading  Resting, elevate, and ice  5  Educated disease prevention and risks related to diabetes  Educated proper daily foot care and exam   Instructed proper skin care / protection and footwear  Discussed proper BS control with diet  6  RA in 4 weeks  Procedure: All mycotic toenails were reduced and debrided in length, width, and girth using a nail nipper and dremel  All hyperkeratotic skin lesion(s) were sharply pared with a scalpel with no bleeding or evidence of ulceration  Patient tolerated procedure(s) well without complications  Subjective:      HPI:   The patients presents for diabetic foot care and exam   He has high risk diabetic foot with history of ulcer and amputation  No ulcer in his feet  He complained of swelling and discoloration of left great toe  He noticed it yesterday  He does not recall any injury  No significant pain  BS has been high          The following portions of the patient's history were reviewed and updated as appropriate: allergies, current medications, past family history, past medical history, past social history, past surgical history and problem list   All pertinent labs and images were reviewed  Past Medical History  Past Medical History:   Diagnosis Date    Cerebrovascular accident (CVA) due to thrombosis of left middle cerebral artery ([facility] ) 7/29/2018    Chronic kidney disease     [facility]     GERD (gastroesophageal reflux disease)     Hypercholesteremia     Hyperlipidemia     Hypertension     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    Neuropathy     Obesity     Osteomyelitis ([facility] )     last assessed 11/4/16    PVC's (premature ventricular contractions)     sees cardiology Dr Ponce camargo    Stroke [facility])     last weeof July 2018 [address] [facility]    TIA (transient ischemic attack) 10/28/2018       Past Surgical History  Past Surgical History:   Procedure Laterality Date    ABDOMINAL SURGERY      CHOLECYSTECTOMY      Percutaneous    COLONOSCOPY      CYSTOSCOPY      OTHER SURGICAL HISTORY      "stimulator to control bowel movements"    VT ESOPHAGOGASTRODUODENOSCOPY TRANSORAL DIAGNOSTIC N/A 9/27/2016    Procedure: ESOPHAGOGASTRODUODENOSCOPY (EGD); Surgeon: Wolfgang Cardenas MD;  [facility]: [facility]; Service: Gastroenterology    VT LAP,CHOLECYSTECTOMY N/A 2/29/2016    Procedure: LAPAROSCOPIC CHOLECYSTECTOMY ;  Surgeon: Eduardo Avendano DO;  Location[facility];  Service: General    ROTATOR CUFF REPAIR Right     TOE AMPUTATION Right 10/28/2016    Procedure: 3RD TOE AMPUTATION ;  Surgeon: Dee Cody DPM;  [facility];  Service:         Allergies:  Patient has no known allergies      Medications:  Current Outpatient Medications   Medication Sig Dispense Refill    aspirin (ECOTRIN LOW STRENGTH) 81 mg EC tablet Take 81 mg by mouth daily Resume on 8/14        atorvastatin (LIPITOR) 40 mg tablet Take 1 tablet (40 mg total) by mouth daily with dinner 90 tablet 1    BD Pen Needle Adina U/F 32G X 4 MM MISC USE TO INJECT INSULIN 4 TIMES DAILY 400 each 1    Blood Glucose Monitoring Suppl (True Metrix Meter) w/Device KIT Use to test blood sugars 3 times daily 1 kit 0    Blood Pressure Monitoring (BLOOD PRESSURE CUFF) MISC Use to check blood pressure before taking blood pressure medication and 1 hour after and follow instructions provided in discharge instructions based on the readings  1 each 0    calcium acetate (CALPHRON) 667 mg TAKE 2 TABLETS BY MOUTH THREE TIMES DAILY WITH MEALS      carvedilol (COREG) 6 25 mg tablet Take 1 tablet (6 25 mg total) by mouth 2 (two) times a day with meals Or as directed by your kidney doctor 180 tablet 1    Cholecalciferol (Vitamin D3) 1 25 MG (18210 UT) CAPS TAKE 1 CAPSULE BY MOUTH ONE TIME PER WEEK 12 capsule 2    Continuous Blood Gluc  (DEXCOM G6 ) LANCE Use as directed for continuous glucose monitoring 1 Device 0    Continuous Blood Gluc Sensor (DEXCOM G6 SENSOR) MISC Use as directed for continuous glucose monitoring   Change every 10 days 1 each 11    Continuous Blood Gluc Transmit (DEXCOM G6 TRANSMITTER) MISC Use as directed for continuous glucose monitoring-Change every 3 months 1 each 3    doxercalciferol (HECTOROL) 0 5 mcg capsule 4 mcg      glucose blood (True Metrix Blood Glucose Test) test strip Use 1 each 3 (three) times a day Use as instructed 100 each 0    Incontinence Supplies (MALE URINAL) MISC by Does not apply route daily 6 each 3    insulin lispro (HumaLOG KwikPen) 100 units/mL injection pen INJECT 4 UNITS 3 TIMES A DAY WITH MEALS PLUS SCALE (max daily dose 42 units) 45 mL 1    Insulin Syringe-Needle U-100 (B-D INS SYRINGE 0 5CC/30GX1/2") 30G X 1/2" 0 5 ML MISC Inject under the skin 4 (four) times a day 360 each 1    Lancets Ultra Thin 30G MISC Use 3 times a day 300 each 0    Lantus 100 UNIT/ML subcutaneous injection INJECT 14 UNITS UNDER THE SKIN DAILY 10 mL 0    meclizine (ANTIVERT) 25 mg tablet Take 1 tablet (25 mg total) by mouth every 8 (eight) hours as needed for dizziness or nausea 30 tablet 0    Nutritional Supplements (VITAMIN D BOOSTER PO) Take 0 5 mcg by mouth        ONETOUCH DELICA LANCETS 19O MISC by Does not apply route 3 (three) times a day 270 each 1    TORSEMIDE PO Take 50 mg by mouth Pt takes 50 mg daily on non- dialysis days: sat, sun, Tues, and thrusday  Pt takes 10 mg of torsemide daily on dialysis days m- w- f  No current facility-administered medications for this visit  Social History:  Social History     Socioeconomic History    Marital status: /Civil Union     Spouse name: None    Number of children: None    Years of education: None    Highest education level: Not asked   Occupational History    Occupation: disabled   Tobacco Use    Smoking status: Never Smoker    Smokeless tobacco: Never Used   Vaping Use    Vaping Use: Never used   Substance and Sexual Activity    Alcohol use: Not Currently    Drug use: No    Sexual activity: Not Currently   Other Topics Concern    None   Social History Narrative    Daily caffeine consumption 2-3 servings a day     Social Determinants of Health     Financial Resource Strain: Not on file   Food Insecurity: Not on file   Transportation Needs: Not on file   Physical Activity: Not on file   Stress: Not on file   Social Connections: Not on file   Intimate Partner Violence: Not on file   Housing Stability: Not on file        Review of Systems   Constitutional: Negative for chills and fever  Respiratory: Negative for cough and shortness of breath  Cardiovascular: Negative for chest pain  Gastrointestinal: Negative for constipation, diarrhea, nausea and vomiting  Neurological: Positive for numbness  Objective:      /75   Pulse 89   Ht 5' 9" (1 753 m)   Wt 108 kg (239 lb)   BMI 35 29 kg/m²          Physical Exam  Vitals reviewed  Constitutional:       General: He is not in acute distress  Appearance: He is well-developed  He is not toxic-appearing or diaphoretic     Cardiovascular: Rate and Rhythm: Normal rate and regular rhythm  Pulses:           Dorsalis pedis pulses are 1+ on the right side and 1+ on the left side  Posterior tibial pulses are 1+ on the right side and 1+ on the left side  Pulmonary:      Effort: Pulmonary effort is normal  No respiratory distress  Musculoskeletal:         General: Swelling, deformity and signs of injury present  Right foot: No foot drop  Left foot: No foot drop  Comments: Hammertoe deformity noted  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  Ecchymosis and mild edema left great toe  Mild tenderness noted  Feet:      Right foot:      Skin integrity: Dry skin present  No ulcer, skin breakdown, erythema, warmth or callus  Left foot:      Skin integrity: Dry skin present  No ulcer, skin breakdown, erythema, warmth or callus  Skin:     General: Skin is warm  Capillary Refill: Capillary refill takes less than 2 seconds  Coloration: Skin is not cyanotic or mottled  Findings: No ecchymosis or erythema  Rash is not purpuric  Nails: There is no clubbing  Comments: Thick mycotic nails X 7 with discoloration and onycholysis  HPK X 2 on left 1st met head and submet 2  He has class finding with previous toe amputation  Neurological:      General: No focal deficit present  Mental Status: He is alert and oriented to person, place, and time  Cranial Nerves: No cranial nerve deficit  Sensory: Sensory deficit present  Motor: No weakness  Psychiatric:         Mood and Affect: Mood normal          Behavior: Behavior normal          Thought Content:  Thought content normal          Judgment: Judgment normal

## 2022-04-15 ENCOUNTER — TELEPHONE (OUTPATIENT)
Dept: CARDIOLOGY CLINIC | Facility: CLINIC | Age: 62
End: 2022-04-15

## 2022-04-15 NOTE — TELEPHONE ENCOUNTER
I spoke to patient and patient's wife  She denies no change in symptom  Stress test was discussed with them and detail  It was discussed that he could have old heart attack or body attenuation artifact cannot rule out blockage of right coronary artery  If he has any symptoms he will need cardiac catheterization  She denies any symptoms and would call us if any change in symptoms  For now she would prefer continuing medical Rx

## 2022-04-15 NOTE — TELEPHONE ENCOUNTER
I spoke with patient's wofe, made aware of results  Patient reports occasional chest pain, no shortness of breath  He has had dizziness and lightheaded episodes  But wife is unsure if this is means of concern   I informed her that I would notify you of these issues

## 2022-04-19 ENCOUNTER — TELEPHONE (OUTPATIENT)
Dept: NEUROLOGY | Facility: CLINIC | Age: 62
End: 2022-04-19

## 2022-04-19 ENCOUNTER — HOSPITAL ENCOUNTER (INPATIENT)
Facility: HOSPITAL | Age: 62
LOS: 2 days | Discharge: HOME HEALTH | DRG: 091 | End: 2022-04-21
Attending: EMERGENCY MEDICINE | Admitting: INTERNAL MEDICINE
Payer: MEDICARE

## 2022-04-19 ENCOUNTER — APPOINTMENT (INPATIENT)
Dept: MRI IMAGING | Facility: HOSPITAL | Age: 62
DRG: 091 | End: 2022-04-19
Payer: MEDICARE

## 2022-04-19 ENCOUNTER — APPOINTMENT (EMERGENCY)
Dept: CT IMAGING | Facility: HOSPITAL | Age: 62
DRG: 091 | End: 2022-04-19
Payer: MEDICARE

## 2022-04-19 DIAGNOSIS — R41.89 COGNITIVE IMPAIRMENT: ICD-10-CM

## 2022-04-19 DIAGNOSIS — K21.9 GASTROESOPHAGEAL REFLUX DISEASE WITHOUT ESOPHAGITIS: ICD-10-CM

## 2022-04-19 DIAGNOSIS — R53.1 RIGHT SIDED WEAKNESS: Primary | ICD-10-CM

## 2022-04-19 DIAGNOSIS — Z86.73 HISTORY OF STROKE: ICD-10-CM

## 2022-04-19 DIAGNOSIS — R53.1 WEAKNESS: ICD-10-CM

## 2022-04-19 DIAGNOSIS — N18.6 ESRD ON DIALYSIS (HCC): ICD-10-CM

## 2022-04-19 DIAGNOSIS — Z99.2 ESRD ON DIALYSIS (HCC): ICD-10-CM

## 2022-04-19 DIAGNOSIS — R20.0 NUMBNESS: ICD-10-CM

## 2022-04-19 LAB
2HR DELTA HS TROPONIN: 0 NG/L
4HR DELTA HS TROPONIN: -9 NG/L
ANION GAP SERPL CALCULATED.3IONS-SCNC: 12 MMOL/L (ref 4–13)
APTT PPP: 30 SECONDS (ref 23–37)
ATRIAL RATE: 74 BPM
ATRIAL RATE: 76 BPM
BUN SERPL-MCNC: 40 MG/DL (ref 5–25)
CALCIUM SERPL-MCNC: 8.5 MG/DL (ref 8.3–10.1)
CARDIAC TROPONIN I PNL SERPL HS: 46 NG/L
CARDIAC TROPONIN I PNL SERPL HS: 55 NG/L
CARDIAC TROPONIN I PNL SERPL HS: 55 NG/L
CHLORIDE SERPL-SCNC: 100 MMOL/L (ref 100–108)
CHOLEST SERPL-MCNC: 76 MG/DL
CO2 SERPL-SCNC: 29 MMOL/L (ref 21–32)
CREAT SERPL-MCNC: 6.52 MG/DL (ref 0.6–1.3)
ERYTHROCYTE [DISTWIDTH] IN BLOOD BY AUTOMATED COUNT: 14.2 % (ref 11.6–15.1)
EST. AVERAGE GLUCOSE BLD GHB EST-MCNC: 126 MG/DL
GFR SERPL CREATININE-BSD FRML MDRD: 8 ML/MIN/1.73SQ M
GLUCOSE SERPL-MCNC: 154 MG/DL (ref 65–140)
GLUCOSE SERPL-MCNC: 158 MG/DL (ref 65–140)
GLUCOSE SERPL-MCNC: 168 MG/DL (ref 65–140)
GLUCOSE SERPL-MCNC: 169 MG/DL (ref 65–140)
HBA1C MFR BLD: 6 %
HCT VFR BLD AUTO: 33.4 % (ref 36.5–49.3)
HDLC SERPL-MCNC: 28 MG/DL
HGB BLD-MCNC: 10.8 G/DL (ref 12–17)
INR PPP: 1.02 (ref 0.84–1.19)
LDLC SERPL CALC-MCNC: 25 MG/DL (ref 0–100)
MCH RBC QN AUTO: 30.8 PG (ref 26.8–34.3)
MCHC RBC AUTO-ENTMCNC: 32.3 G/DL (ref 31.4–37.4)
MCV RBC AUTO: 95 FL (ref 82–98)
NONHDLC SERPL-MCNC: 48 MG/DL
P AXIS: 42 DEGREES
P AXIS: 44 DEGREES
P AXIS: 46 DEGREES
P AXIS: 54 DEGREES
PLATELET # BLD AUTO: 154 THOUSANDS/UL (ref 149–390)
PMV BLD AUTO: 10.2 FL (ref 8.9–12.7)
POTASSIUM SERPL-SCNC: 4.2 MMOL/L (ref 3.5–5.3)
PR INTERVAL: 160 MS
PR INTERVAL: 178 MS
PR INTERVAL: 178 MS
PR INTERVAL: 182 MS
PROTHROMBIN TIME: 13.4 SECONDS (ref 11.6–14.5)
QRS AXIS: -10 DEGREES
QRS AXIS: -14 DEGREES
QRS AXIS: -14 DEGREES
QRS AXIS: -41 DEGREES
QRSD INTERVAL: 158 MS
QRSD INTERVAL: 160 MS
QRSD INTERVAL: 162 MS
QRSD INTERVAL: 162 MS
QT INTERVAL: 458 MS
QT INTERVAL: 460 MS
QT INTERVAL: 468 MS
QT INTERVAL: 468 MS
QTC INTERVAL: 508 MS
QTC INTERVAL: 510 MS
QTC INTERVAL: 516 MS
QTC INTERVAL: 519 MS
RBC # BLD AUTO: 3.51 MILLION/UL (ref 3.88–5.62)
SODIUM SERPL-SCNC: 141 MMOL/L (ref 136–145)
T WAVE AXIS: -1 DEGREES
T WAVE AXIS: -31 DEGREES
T WAVE AXIS: -8 DEGREES
T WAVE AXIS: 5 DEGREES
TRIGL SERPL-MCNC: 116 MG/DL
VENTRICULAR RATE: 73 BPM
VENTRICULAR RATE: 74 BPM
WBC # BLD AUTO: 6.01 THOUSAND/UL (ref 4.31–10.16)

## 2022-04-19 PROCEDURE — 93005 ELECTROCARDIOGRAM TRACING: CPT

## 2022-04-19 PROCEDURE — 85610 PROTHROMBIN TIME: CPT

## 2022-04-19 PROCEDURE — 84484 ASSAY OF TROPONIN QUANT: CPT

## 2022-04-19 PROCEDURE — 85027 COMPLETE CBC AUTOMATED: CPT

## 2022-04-19 PROCEDURE — 93010 ELECTROCARDIOGRAM REPORT: CPT | Performed by: INTERNAL MEDICINE

## 2022-04-19 PROCEDURE — 99222 1ST HOSP IP/OBS MODERATE 55: CPT | Performed by: STUDENT IN AN ORGANIZED HEALTH CARE EDUCATION/TRAINING PROGRAM

## 2022-04-19 PROCEDURE — 99223 1ST HOSP IP/OBS HIGH 75: CPT | Performed by: INTERNAL MEDICINE

## 2022-04-19 PROCEDURE — 70498 CT ANGIOGRAPHY NECK: CPT

## 2022-04-19 PROCEDURE — 99291 CRITICAL CARE FIRST HOUR: CPT | Performed by: PSYCHIATRY & NEUROLOGY

## 2022-04-19 PROCEDURE — 36415 COLL VENOUS BLD VENIPUNCTURE: CPT

## 2022-04-19 PROCEDURE — 99285 EMERGENCY DEPT VISIT HI MDM: CPT

## 2022-04-19 PROCEDURE — 80061 LIPID PANEL: CPT | Performed by: PSYCHIATRY & NEUROLOGY

## 2022-04-19 PROCEDURE — 83036 HEMOGLOBIN GLYCOSYLATED A1C: CPT | Performed by: PSYCHIATRY & NEUROLOGY

## 2022-04-19 PROCEDURE — 99285 EMERGENCY DEPT VISIT HI MDM: CPT | Performed by: EMERGENCY MEDICINE

## 2022-04-19 PROCEDURE — 70496 CT ANGIOGRAPHY HEAD: CPT

## 2022-04-19 PROCEDURE — 85730 THROMBOPLASTIN TIME PARTIAL: CPT

## 2022-04-19 PROCEDURE — 82948 REAGENT STRIP/BLOOD GLUCOSE: CPT

## 2022-04-19 PROCEDURE — 80048 BASIC METABOLIC PNL TOTAL CA: CPT

## 2022-04-19 PROCEDURE — G1004 CDSM NDSC: HCPCS

## 2022-04-19 RX ORDER — CALCIUM ACETATE 667 MG/1
1334 CAPSULE ORAL
Status: DISCONTINUED | OUTPATIENT
Start: 2022-04-19 | End: 2022-04-21 | Stop reason: HOSPADM

## 2022-04-19 RX ORDER — CLOPIDOGREL BISULFATE 75 MG/1
75 TABLET ORAL DAILY
Status: DISCONTINUED | OUTPATIENT
Start: 2022-04-20 | End: 2022-04-21 | Stop reason: HOSPADM

## 2022-04-19 RX ORDER — TORSEMIDE 10 MG/1
10 TABLET ORAL 3 TIMES WEEKLY
Status: DISCONTINUED | OUTPATIENT
Start: 2022-04-20 | End: 2022-04-21 | Stop reason: HOSPADM

## 2022-04-19 RX ORDER — CARVEDILOL 6.25 MG/1
6.25 TABLET ORAL 2 TIMES DAILY WITH MEALS
Status: DISCONTINUED | OUTPATIENT
Start: 2022-04-20 | End: 2022-04-21 | Stop reason: HOSPADM

## 2022-04-19 RX ORDER — ASPIRIN 81 MG/1
81 TABLET, CHEWABLE ORAL DAILY
Status: DISCONTINUED | OUTPATIENT
Start: 2022-04-20 | End: 2022-04-21 | Stop reason: HOSPADM

## 2022-04-19 RX ORDER — ASPIRIN 325 MG
325 TABLET ORAL ONCE
Status: COMPLETED | OUTPATIENT
Start: 2022-04-19 | End: 2022-04-19

## 2022-04-19 RX ORDER — CALCIUM CARBONATE 200(500)MG
500 TABLET,CHEWABLE ORAL DAILY PRN
Status: DISCONTINUED | OUTPATIENT
Start: 2022-04-19 | End: 2022-04-21 | Stop reason: HOSPADM

## 2022-04-19 RX ORDER — CLOPIDOGREL BISULFATE 75 MG/1
300 TABLET ORAL ONCE
Status: COMPLETED | OUTPATIENT
Start: 2022-04-19 | End: 2022-04-19

## 2022-04-19 RX ORDER — ATORVASTATIN CALCIUM 40 MG/1
40 TABLET, FILM COATED ORAL
Status: DISCONTINUED | OUTPATIENT
Start: 2022-04-19 | End: 2022-04-21 | Stop reason: HOSPADM

## 2022-04-19 RX ORDER — HEPARIN SODIUM 5000 [USP'U]/ML
5000 INJECTION, SOLUTION INTRAVENOUS; SUBCUTANEOUS EVERY 8 HOURS SCHEDULED
Status: DISCONTINUED | OUTPATIENT
Start: 2022-04-19 | End: 2022-04-21 | Stop reason: HOSPADM

## 2022-04-19 RX ORDER — CALCIUM ACETATE 667 MG/1
2001 CAPSULE ORAL
Status: DISCONTINUED | OUTPATIENT
Start: 2022-04-19 | End: 2022-04-19 | Stop reason: SDUPTHER

## 2022-04-19 RX ORDER — DOXERCALCIFEROL 2 UG/ML
5 INJECTION, SOLUTION INTRAVENOUS
Status: DISCONTINUED | OUTPATIENT
Start: 2022-04-19 | End: 2022-04-21 | Stop reason: HOSPADM

## 2022-04-19 RX ORDER — MAGNESIUM HYDROXIDE/ALUMINUM HYDROXICE/SIMETHICONE 120; 1200; 1200 MG/30ML; MG/30ML; MG/30ML
30 SUSPENSION ORAL EVERY 4 HOURS PRN
Status: DISCONTINUED | OUTPATIENT
Start: 2022-04-19 | End: 2022-04-21 | Stop reason: HOSPADM

## 2022-04-19 RX ORDER — INSULIN GLARGINE 100 [IU]/ML
14 INJECTION, SOLUTION SUBCUTANEOUS DAILY
Status: DISCONTINUED | OUTPATIENT
Start: 2022-04-19 | End: 2022-04-21 | Stop reason: HOSPADM

## 2022-04-19 RX ADMIN — HEPARIN SODIUM 5000 UNITS: 5000 INJECTION INTRAVENOUS; SUBCUTANEOUS at 21:53

## 2022-04-19 RX ADMIN — INSULIN LISPRO 1 UNITS: 100 INJECTION, SOLUTION INTRAVENOUS; SUBCUTANEOUS at 17:44

## 2022-04-19 RX ADMIN — CLOPIDOGREL BISULFATE 300 MG: 75 TABLET ORAL at 07:02

## 2022-04-19 RX ADMIN — CALCIUM CARBONATE (ANTACID) CHEW TAB 500 MG 500 MG: 500 CHEW TAB at 16:08

## 2022-04-19 RX ADMIN — INSULIN LISPRO 4 UNITS: 100 INJECTION, SOLUTION INTRAVENOUS; SUBCUTANEOUS at 17:44

## 2022-04-19 RX ADMIN — ALUMINA, MAGNESIA, AND SIMETHICONE ORAL SUSPENSION REGULAR STRENGTH 30 ML: 1200; 1200; 120 SUSPENSION ORAL at 16:08

## 2022-04-19 RX ADMIN — IOHEXOL 85 ML: 350 INJECTION, SOLUTION INTRAVENOUS at 06:31

## 2022-04-19 RX ADMIN — CALCIUM ACETATE 1334 MG: 667 CAPSULE ORAL at 16:13

## 2022-04-19 RX ADMIN — ATORVASTATIN CALCIUM 40 MG: 40 TABLET, FILM COATED ORAL at 16:08

## 2022-04-19 RX ADMIN — INSULIN LISPRO 1 UNITS: 100 INJECTION, SOLUTION INTRAVENOUS; SUBCUTANEOUS at 21:53

## 2022-04-19 RX ADMIN — TORSEMIDE 50 MG: 20 TABLET ORAL at 17:43

## 2022-04-19 RX ADMIN — ASPIRIN 325 MG ORAL TABLET 325 MG: 325 PILL ORAL at 07:02

## 2022-04-19 NOTE — H&P
[facility]  H&P- Eden Roll 1960, 58 y o  male MRN: [id number]  Unit/Bed#: FT 03 Encounter: [id number]  Primary Care Provider: Mohsen Emery DO   Date and time admitted to hospital: 4/19/2022  5:55 AM    ESRD on dialysis [facility])  Assessment & Plan  Lab Results   Component Value Date    EGFR 8 04/19/2022    EGFR 7 04/07/2022    EGFR 9 04/06/2022    CREATININE 6 52 (H) 04/19/2022    CREATININE 6 80 (H) 04/07/2022    CREATININE 5 79 (H) 04/06/2022   On hemodialysis on Monday Wednesday Friday  Nephrology consulted  Will continue calcium, vitamin D3, renal diet and torsemide    History of stroke  Assessment & Plan  History of left-sided lacunar stroke in 2018  With questionable residual right sided weakness  On aspirin and Lipitor at home    Mixed hyperlipidemia  Assessment & Plan  Lipid panel is pending  Continue Lipitor    Type 2 diabetes mellitus with chronic kidney disease on chronic dialysis, with long-term current use of insulin [facility])  Assessment & Plan  Lab Results   Component Value Date    HGBA1C 6 4 12/09/2021     Recent Labs     04/19/22  0613   POCGLU 168*     Blood Sugar Average: Last 72 hrs:  (P) 168   Will continue home insulin regimen, SSI, along with diabetic diet    * Right sided weakness  Assessment & Plan  Patient presents with right sided pain/weakness this morning, was normal last night   EKG in the ED without Afib, CT head and neck shows stable chronic small-vessel ischemic changes and moderate atherosclerotic disease  Neurology consulted, recommend admitting along stroke pathway  MRI ordered  Lipid panel and A1c are pending  Recent stress test done this month  Neurochecks    Hypertriglyceridemia-resolved as of 4/19/2022  Assessment & Plan  Lipid panel is pending  Continue Lipitor    VTE Pharmacologic Prophylaxis: VTE Score: 3 Moderate Risk (Score 3-4) - Pharmacological DVT Prophylaxis Ordered: heparin    Code Status:  Full  Discussion with family: Updated contact person (wife and son) at bedside  Anticipated Length of Stay: Patient will be admitted on an inpatient basis with an anticipated length of stay of greater than 2 midnights secondary to Stroke workup  Total Time for Visit, including Counseling / Coordination of Care: 60 minutes Greater than 50% of this total time spent on direct patient counseling and coordination of care  Chief Complaint:  Right-sided weakness    History of Present Illness:  Godwin Raymond is a 58 y o  male with a PMH of left thalamic stroke, ESRD and type 2 diabetes who presents with right-sided weakness  Patient states that he last felt normal yesterday evening before bed  He awoke this morning with right-sided pain and questionable weakness of his right upper and lower extremities  He states that his sensation is intact however  His family states that he had a saw heavy meal over the weekend and had extra volume removed with dialysis yesterday  In the ED he had a CT head and neck which showed no acute ischemia  He states the pain is still present, however is bearable  He denies any blurry vision, ringing in his ears, lightheadedness, chest pain, shortness of breath  Review of Systems:  Review of Systems   Constitutional: Negative for activity change, appetite change, chills, diaphoresis, fatigue and fever  HENT: Negative for congestion, rhinorrhea, sinus pressure, sinus pain and sore throat  Eyes: Negative  Respiratory: Negative for cough, chest tightness and shortness of breath  Cardiovascular: Negative for chest pain, palpitations and leg swelling  Gastrointestinal: Negative for abdominal distention, abdominal pain, constipation, diarrhea, nausea and vomiting  Endocrine: Negative  Genitourinary: Negative for difficulty urinating, dysuria, flank pain, frequency, hematuria and urgency  Musculoskeletal: Negative for back pain, gait problem and neck pain  Right arm and leg pain   Skin: Negative  Allergic/Immunologic: Negative  Neurological: Positive for weakness  Negative for dizziness, syncope, speech difficulty, light-headedness and headaches  Hematological: Negative  Psychiatric/Behavioral: Negative  All other systems reviewed and are negative  Past Medical and Surgical History:   Past Medical History:   Diagnosis Date    Cerebrovascular accident (CVA) due to thrombosis of left middle cerebral artery ([facility] ) 7/29/2018    Chronic kidney disease     Diabetes mellitus ([facility] )     GERD (gastroesophageal reflux disease)     Hypercholesteremia     Hyperlipidemia     Hypertension     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    Neuropathy     Obesity     Osteomyelitis ([facility] )     last assessed 11/4/16    PVC's (premature ventricular contractions)     sees cardiology Dr Julian camargo    Stroke [facility])     last weeof July 2018 [address] [facility]    TIA (transient ischemic attack) 10/28/2018       Past Surgical History:   Procedure Laterality Date    ABDOMINAL SURGERY      CHOLECYSTECTOMY      Percutaneous    COLONOSCOPY      CYSTOSCOPY      OTHER SURGICAL HISTORY      "stimulator to control bowel movements"    [place] ESOPHAGOGASTRODUODENOSCOPY TRANSORAL DIAGNOSTIC N/A 9/27/2016    Procedure: ESOPHAGOGASTRODUODENOSCOPY (EGD); Surgeon: Yelitza Michaels MD;  Location: [facility]; Service: Gastroenterology    [place] LAP,CHOLECYSTECTOMY N/A 2/29/2016    Procedure: LAPAROSCOPIC CHOLECYSTECTOMY ;  Surgeon: Stan Plasencia DO;  Location: [facility];  Service: General    ROTATOR CUFF REPAIR Right     TOE AMPUTATION Right 10/28/2016    Procedure: 3RD TOE AMPUTATION ;  Surgeon: Lalita Pierce DPM;  Location[facility];  Service:        Meds/Allergies:  Prior to Admission medications    Medication Sig Start Date End Date Taking?  Authorizing Provider   aspirin (ECOTRIN LOW STRENGTH) 81 mg EC tablet Take 81 mg by mouth daily Resume on 8/14      Historical Provider, MD   atorvastatin (LIPITOR) 40 mg tablet Take 1 tablet (40 mg total) by mouth daily with dinner 12/13/21   Raj Auguste DO   BD Pen Needle Adina U/F 32G X 4 MM MISC USE TO INJECT INSULIN 4 TIMES DAILY 5/19/21   Lissette Brennan PA-C   Blood Glucose Monitoring Suppl (True Metrix Meter) w/Device KIT Use to test blood sugars 3 times daily 3/28/22   Lissette Brennan PA-C   Blood Pressure Monitoring (BLOOD PRESSURE CUFF) MISC Use to check blood pressure before taking blood pressure medication and 1 hour after and follow instructions provided in discharge instructions based on the readings  8/13/18   Sixto Joseph MD   calcium acetate (CALPHRON) 667 mg TAKE 2 TABLETS BY MOUTH THREE TIMES DAILY WITH MEALS 10/17/21   Historical Provider, MD   carvedilol (COREG) 6 25 mg tablet Take 1 tablet (6 25 mg total) by mouth 2 (two) times a day with meals Or as directed by your kidney doctor 2/3/22   Vinicius Kim DO   Cholecalciferol (Vitamin D3) 1 25 MG (42319 UT) CAPS TAKE 1 CAPSULE BY MOUTH ONE TIME PER WEEK 10/6/21   Monique Arias MD   Continuous Blood Gluc  (DEXCOM G6 ) LANCE Use as directed for continuous glucose monitoring 10/28/19   Lissette Brennan PA-C   Continuous Blood Gluc Sensor (DEXCOM G6 SENSOR) MISC Use as directed for continuous glucose monitoring   Change every 10 days 10/28/19   Lissette Brennan PA-C   Continuous Blood Gluc Transmit (DEXCOM G6 TRANSMITTER) MISC Use as directed for continuous glucose monitoring-Change every 3 months 10/28/19   Lissette Brennan PA-C   doxercalciferol (HECTOROL) 0 5 mcg capsule 4 mcg 11/10/21 11/9/22  Historical Provider, MD   glucose blood (True Metrix Blood Glucose Test) test strip Use 1 each 3 (three) times a day Use as instructed 4/12/22   Krissy Boykin MD   Incontinence Supplies (MALE URINAL) MISC by Does not apply route daily 9/24/18   Raj Auguste DO   insulin lispro (HumaLOG KwikPen) 100 units/mL injection pen INJECT 4 UNITS 3 TIMES A DAY WITH MEALS PLUS SCALE (max daily dose 42 units) 4/7/22   Ady Weathers MD   Insulin Syringe-Needle U-100 (B-D INS SYRINGE 0 5CC/30GX1/2") 30G X 1/2" 0 5 ML MISC Inject under the skin 4 (four) times a day 6/5/20   Ady Weathers MD   Lancets Ultra Thin 30G MISC Use 3 times a day 3/28/22   Lissette Brennan PA-C   Lantus 100 UNIT/ML subcutaneous injection INJECT 14 UNITS UNDER THE SKIN DAILY 4/13/22   Lissette Brennan PA-C   meclizine (ANTIVERT) 25 mg tablet Take 1 tablet (25 mg total) by mouth every 8 (eight) hours as needed for dizziness or nausea 4/7/22   Radha Dai MD   Nutritional Supplements (VITAMIN D BOOSTER PO) Take 0 5 mcg by mouth   6/21/21 6/20/22  Historical Provider, MD Ismael Paz LANCETS 09G MISC by Does not apply route 3 (three) times a day 11/27/18   Raj Auguste DO   TORSEMIDE PO Take 50 mg by mouth Pt takes 50 mg daily on non- dialysis days: sat, sun, Tues, and thrusday  Pt takes 10 mg of torsemide daily on dialysis days m- w- f   10/25/21   Historical Provider, MD     I have reviewed home medications with patient personally      Allergies: No Known Allergies    Social History:  Marital Status: /Civil Union   Occupation:  Does not work  Patient Pre-hospital Living Situation: Home  Patient Pre-hospital Level of Mobility: walks with walker  Patient Pre-hospital Diet Restrictions:  Renal and diabetic  Substance Use History:   Social History     Substance and Sexual Activity   Alcohol Use Not Currently     Social History     Tobacco Use   Smoking Status Never Smoker   Smokeless Tobacco Never Used     Social History     Substance and Sexual Activity   Drug Use No       Family History:  Family History   Problem Relation Age of Onset    Leukemia Mother     Liver disease Mother     Lung cancer Mother         heavy smoker - 3 ppd    Heart disease Father     Liver disease Father     Multiple myeloma Sister     Breast cancer Sister     Urolithiasis Family     Alcohol abuse Neg Hx     Depression Neg Hx     Drug abuse Neg Hx  Substance Abuse Neg Hx     Mental illness Neg Hx        Physical Exam:     Vitals:   Blood Pressure: 147/67 (04/19/22 1100)  Pulse: 72 (04/19/22 1100)  Temperature: 97 6 °F (36 4 °C) (04/19/22 0558)  Temp Source: Oral (04/19/22 0558)  Respirations: 16 (04/19/22 1100)  Height: 5' 9" (175 3 cm) (04/19/22 0558)  Weight - Scale: 109 kg (239 lb 10 2 oz) (04/19/22 0558)  SpO2: 99 % (04/19/22 1100)    Physical Exam  Vitals and nursing note reviewed  Constitutional:       Appearance: He is obese  Comments: Chronically ill-appearing   HENT:      Head: Normocephalic and atraumatic  Right Ear: External ear normal       Left Ear: External ear normal       Nose: Nose normal       Mouth/Throat:      Mouth: Mucous membranes are moist       Pharynx: Oropharynx is clear  Eyes:      Conjunctiva/sclera: Conjunctivae normal       Pupils: Pupils are equal, round, and reactive to light  Cardiovascular:      Rate and Rhythm: Normal rate and regular rhythm  Pulses: Normal pulses  Heart sounds: Normal heart sounds  Pulmonary:      Effort: Pulmonary effort is normal       Breath sounds: Normal breath sounds  Abdominal:      General: Abdomen is flat  Bowel sounds are normal       Palpations: Abdomen is soft  Musculoskeletal:         General: No swelling or tenderness  Cervical back: Neck supple  No muscular tenderness  Comments: Right-sided AV fistula palpable thrill   Skin:     General: Skin is warm and dry  Capillary Refill: Capillary refill takes less than 2 seconds  Neurological:      General: No focal deficit present  Mental Status: He is alert and oriented to person, place, and time  Mental status is at baseline  Cranial Nerves: No cranial nerve deficit  Sensory: No sensory deficit  Motor: No weakness  Psychiatric:         Mood and Affect: Mood normal          Behavior: Behavior normal          Thought Content:  Thought content normal          Judgment: Judgment normal           Additional Data:     Lab Results:  Results from last 7 days   Lab Units 04/19/22  0616   WBC Thousand/uL 6 01   HEMOGLOBIN g/dL 10 8*   HEMATOCRIT % 33 4*   PLATELETS Thousands/uL 154     Results from last 7 days   Lab Units 04/19/22  0616   SODIUM mmol/L 141   POTASSIUM mmol/L 4 2   CHLORIDE mmol/L 100   CO2 mmol/L 29   BUN mg/dL 40*   CREATININE mg/dL 6 52*   ANION GAP mmol/L 12   CALCIUM mg/dL 8 5   GLUCOSE RANDOM mg/dL 158*     Results from last 7 days   Lab Units 04/19/22  0616   INR  1 02     Results from last 7 days   Lab Units 04/19/22  0613   POC GLUCOSE mg/dl 168*               Imaging: Reviewed radiology reports from this admission including: CT head  CTA stroke alert (head/neck)   Final Result by Gaudencio Szymanski MD (04/19 0700)      No acute intracranial abnormality  No large vessel occlusion  Moderate atherosclerotic disease with moderate stenosis of the cavernous right internal carotid artery and severe stenosis of the intracranial left vertebral artery  Focus of moderate stenosis at the proximal left M1 segment  Chronic truncation of the terminal left middle cerebral artery territory branches  Moderate stenosis of the proximal P1 segment of the left posterior cerebral artery  Mild to moderate atherosclerotic plaquing of the bilateral carotid bulbs causing no hemodynamically significant stenosis  I personally reported the findings to Sarah aSnon on 4/19/2022 at 6:44 AM             Workstation performed: [id number]         CT stroke alert brain   Final Result by Gaudencio Szymanski MD (04/19 0645)      No acute intracranial abnormality  Stable chronic small vessel ischemic changes                   I personally discussed this study with Sarah Sanon on 4/19/2022 at 6:44 AM                    Workstation performed: [id number]         MRI inpatient order    (Results Pending)       EKG and Other Studies Reviewed on Admission:   · EKG: NSR  HR 73     ** Please Note: This note has been constructed using a voice recognition system   **

## 2022-04-19 NOTE — QUICK NOTE
Stroke Alert Note   Andrew Ibanez 58 y o  male  MRN: [id number]   Unit/Bed#: FT 03 Encounter: [id number]     Stroke alert text received at: 6:11am  Call from  received at: 6:12am  Neurology response by phone was immediate    62M with HTN, DM, HLD, ESRD on dialysis, and prior strokes with ?residual left-sided weakness (last neurology note with formal exam, from 2020, documented right arm and leg weakness), and cognitive impairment (progressive rather than residual per notes)  Presented with right arm and leg numbness (stable), right facial pain (improving), as well as nausea and vomiting  Woke up around 5:30am with symptoms  Last known normal was 9pm last night right before he went to bed  /77 on presentation  Only known prior stroke in available records was left thalamic/hypothalamic/internal capsule lacunar stroke causing right-sided symptoms  Pt and wife report only single prior stroke, and when asked about the discrepancy between what they initially reported about his residual left-sided weakness but his only known stroke causing right-sided symptoms, they became uncertain of which side is weak at baseline  NIHSSS 1-2   1 for presenting symptoms of mild right-sided numbness  Additional 1 point for LLE weakness if it is not residual       CT head wo: no acute findings  Compared to CT head from 2 weeks ago, stable chronic strokes in left thalamus, right frontal lobe (present on MRI when he had his stroke in 2018), and left occipital lobe (new since last CT head in 2020)  CTA head/neck: No LVO  No acute findings  Sshort segment severe stenosis of B/L intracranial vertebral arteries  Appears worse to me than prior CTA from 10/2018  IV tPA was not given nor was wake up stroke protocol considered due to low NIHSS/nondisabling symptoms, and last known normal 9 hours prior to stroke alert    Unclear cause of facial pain, but right-sided numbness is concerning for possible new stroke vs TIA  - Admit to stroke pathway  - Recommend loading with aspirin 325mg once, then continuing 81 mg daily, and loading with plavix 300mg once, followed by 75mg daily  - continue home lipitor 40mg Qday  - MRI brain wo, echo, lipid panel, HbA1c  - permissive HTN to 220/110 for 24 hours, monitor on telemetry  - normothermia, euglycemia  - avoid hypotonic fluids        Tejal Lara MD

## 2022-04-19 NOTE — CONSULTS
Consultation - Nephrology   Genesis Santiago 58 y o  male MRN: [id number]  Unit/Bed#: FT 03 Encounter: [id number]    ASSESSMENT:  1  ESRD on HD (MWF @ [address]): next HD scheduled for 4/20/22  - EDW 105kg  2  Access: left AVF  3  Hypertension: BP slightly labile, continue to monitor with home regimen  - avoid hypotension  4  Anemia: hgb at goal  - receives venofer 50mg weekly  - not on BAYLEE  5  Secondary Hyperparathyroidism of renal origin: renal diet  - continue phoslo with meals  - continue hectorol with HD  6  Right sided pain: neurology on board  - planning for MRI without contrast tomorrow    PLAN:  HD Wednesday  Orders written    HISTORY OF PRESENT ILLNESS:  Requesting Physician: Zoltan Curry MD  Reason for Consult: ESRD on HD    Genesis Santiago is a 58y o  year old male who was admitted to [address] [place] [address] after presenting with right sided pain from his head to his leg  He states this woke him up from sleep and he told his wife to take him to the ER  He states his upper right sided pain is better now but still has pain in his right flank and lower leg  A renal consultation is requested today for assistance in the management of ESRD  Genesis Santiago is a known ESRD patient who undergoes maintenance hemodialysis at [address] [facility] on MWF  He completed a full treatment yesterday at outpatient dialysis  He denies SOB  He states he got to his dry weight post treatment yesterday but they were removing a lot of fluid  He denies complaints besides mild pain in his leg and flank today      PAST MEDICAL HISTORY:  Past Medical History:   Diagnosis Date    Cerebrovascular accident (CVA) due to thrombosis of left middle cerebral artery ([facility] ) 7/29/2018    Chronic kidney disease     Diabetes mellitus ([facility] )     GERD (gastroesophageal reflux disease)     Hypercholesteremia     Hyperlipidemia     Hypertension     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    Neuropathy     Obesity     Osteomyelitis ([facility] ) last assessed 11/4/16    PVC's (premature ventricular contractions)     sees cardiology Dr Cassandra camargo    Stroke [facility])     last weeof July 2018 [address] [facility]    TIA (transient ischemic attack) 10/28/2018       PAST SURGICAL HISTORY:  Past Surgical History:   Procedure Laterality Date    ABDOMINAL SURGERY      CHOLECYSTECTOMY      Percutaneous    COLONOSCOPY      CYSTOSCOPY      OTHER SURGICAL HISTORY      "stimulator to control bowel movements"    [place] ESOPHAGOGASTRODUODENOSCOPY TRANSORAL DIAGNOSTIC N/A 9/27/2016    Procedure: ESOPHAGOGASTRODUODENOSCOPY (EGD); Surgeon: Cecy Henry MD;  [facility];   Service: Gastroenterology    [place] LAP,CHOLECYSTECTOMY N/A 2/29/2016    Procedure: LAPAROSCOPIC CHOLECYSTECTOMY ;  Surgeon: Jodie Garcia DO;  [facility];  Service: General    ROTATOR CUFF REPAIR Right     TOE AMPUTATION Right 10/28/2016    Procedure: 3RD TOE AMPUTATION ;  Surgeon: Serena Martinez DPM;  [facility];  Service:        ALLERGIES:  No Known Allergies    SOCIAL HISTORY:  Social History     Substance and Sexual Activity   Alcohol Use Not Currently     Social History     Substance and Sexual Activity   Drug Use No     Social History     Tobacco Use   Smoking Status Never Smoker   Smokeless Tobacco Never Used       FAMILY HISTORY:  Family History   Problem Relation Age of Onset    Leukemia Mother     Liver disease Mother     Lung cancer Mother         heavy smoker - 3 ppd    Heart disease Father     Liver disease Father     Multiple myeloma Sister     Breast cancer Sister     Urolithiasis Family     Alcohol abuse Neg Hx     Depression Neg Hx     Drug abuse Neg Hx     Substance Abuse Neg Hx     Mental illness Neg Hx        MEDICATIONS:    Current Facility-Administered Medications:     [START ON 4/20/2022] aspirin chewable tablet 81 mg, 81 mg, Oral, Daily, Rica Bolaños MD    calcium acetate (PHOSLO) capsule 2,001 mg, 2,001 mg, Oral, TID With Meals, [facility], PA-    [START ON 4/20/2022] clopidogrel (PLAVIX) tablet 75 mg, 75 mg, Oral, Daily, Jourdan Og MD    doxercalciferol (HECTOROL) injection 5 mcg, 5 mcg, Intravenous, After Dialysis, [facility], PA-C    [START ON 4/20/2022] iron sucrose (VENOFER) 50 mg in sodium chloride 0 9 % 100 mL IVPB, 50 mg, Intravenous, Weekly, [facility], PA-C    Current Outpatient Medications:     aspirin (ECOTRIN LOW STRENGTH) 81 mg EC tablet, Take 81 mg by mouth daily Resume on 8/14  , Disp: , Rfl:     atorvastatin (LIPITOR) 40 mg tablet, Take 1 tablet (40 mg total) by mouth daily with dinner, Disp: 90 tablet, Rfl: 1    BD Pen Needle Adina U/F 32G X 4 MM MISC, USE TO INJECT INSULIN 4 TIMES DAILY, Disp: 400 each, Rfl: 1    Blood Glucose Monitoring Suppl (True Metrix Meter) w/Device KIT, Use to test blood sugars 3 times daily, Disp: 1 kit, Rfl: 0    Blood Pressure Monitoring (BLOOD PRESSURE CUFF) MISC, Use to check blood pressure before taking blood pressure medication and 1 hour after and follow instructions provided in discharge instructions based on the readings  , Disp: 1 each, Rfl: 0    calcium acetate (CALPHRON) 667 mg, TAKE 2 TABLETS BY MOUTH THREE TIMES DAILY WITH MEALS, Disp: , Rfl:     carvedilol (COREG) 6 25 mg tablet, Take 1 tablet (6 25 mg total) by mouth 2 (two) times a day with meals Or as directed by your kidney doctor, Disp: 180 tablet, Rfl: 1    Cholecalciferol (Vitamin D3) 1 25 MG (14909 UT) CAPS, TAKE 1 CAPSULE BY MOUTH ONE TIME PER WEEK, Disp: 12 capsule, Rfl: 2    Continuous Blood Gluc  (DEXCOM G6 ) LANCE, Use as directed for continuous glucose monitoring, Disp: 1 Device, Rfl: 0    Continuous Blood Gluc Sensor (DEXCOM G6 SENSOR) MISC, Use as directed for continuous glucose monitoring   Change every 10 days, Disp: 1 each, Rfl: 11    Continuous Blood Gluc Transmit (DEXCOM G6 TRANSMITTER) MISC, Use as directed for continuous glucose monitoring-Change every 3 months, Disp: 1 each, Rfl: 3    doxercalciferol (HECTOROL) 0 5 mcg capsule, 4 mcg, Disp: , Rfl:     glucose blood (True Metrix Blood Glucose Test) test strip, Use 1 each 3 (three) times a day Use as instructed, Disp: 100 each, Rfl: 0    Incontinence Supplies (MALE URINAL) MISC, by Does not apply route daily, Disp: 6 each, Rfl: 3    insulin lispro (HumaLOG KwikPen) 100 units/mL injection pen, INJECT 4 UNITS 3 TIMES A DAY WITH MEALS PLUS SCALE (max daily dose 42 units), Disp: 45 mL, Rfl: 1    Insulin Syringe-Needle U-100 (B-D INS SYRINGE 0 5CC/30GX1/2") 30G X 1/2" 0 5 ML MISC, Inject under the skin 4 (four) times a day, Disp: 360 each, Rfl: 1    Lancets Ultra Thin 30G MISC, Use 3 times a day, Disp: 300 each, Rfl: 0    Lantus 100 UNIT/ML subcutaneous injection, INJECT 14 UNITS UNDER THE SKIN DAILY, Disp: 10 mL, Rfl: 0    meclizine (ANTIVERT) 25 mg tablet, Take 1 tablet (25 mg total) by mouth every 8 (eight) hours as needed for dizziness or nausea, Disp: 30 tablet, Rfl: 0    Nutritional Supplements (VITAMIN D BOOSTER PO), Take 0 5 mcg by mouth  , Disp: , Rfl:     ONETOUCH DELICA LANCETS 42C MISC, by Does not apply route 3 (three) times a day, Disp: 270 each, Rfl: 1    TORSEMIDE PO, Take 50 mg by mouth Pt takes 50 mg daily on non- dialysis days: sat, sun, Tues, and thrusday  Pt takes 10 mg of torsemide daily on dialysis days m- w- f  , Disp: , Rfl:     REVIEW OF SYSTEMS:  A complete 10 point review of systems was performed and found to be negative unless otherwise noted below or in the HPI  General: No fevers, chills  Cardiovascular: No chest pain, shortness of breath, leg edema  Respiratory: No cough, shortness of breath  Gastrointestinal: No nausea, vomiting, diarrhea, right flank pain  Genitourinary: No hematuria  Makes urine      PHYSICAL EXAM:  Current Weight: Weight - Scale: 109 kg (239 lb 10 2 oz)  First Weight: Weight - Scale: 109 kg (239 lb 10 2 oz)  Vitals:    04/19/22 0845 04/19/22 0900 04/19/22 1000 04/19/22 1100   BP: 166/74 (!) 184/84 147/67 147/67   BP Location:       Pulse: 74 74 74 72   Resp: 16 16 16 16   Temp:       TempSrc:       SpO2: 98% (!) 16% 99% 99%   Weight:       Height:         No intake or output data in the 24 hours ending 04/19/22 1229  General: NAD  Skin: no rash  Eyes: anicteric  ENMT: mm moist  Neck: no masses  Respiratory: CTAB  CVS: RRR  Extremities: no edema  GI: soft nt nd  Neuro: alert awake  Psych: mood and affect appropriate    Lab Results:   Results from last 7 days   Lab Units 04/19/22  0616   WBC Thousand/uL 6 01   HEMOGLOBIN g/dL 10 8*   HEMATOCRIT % 33 4*   PLATELETS Thousands/uL 154   POTASSIUM mmol/L 4 2   CHLORIDE mmol/L 100   CO2 mmol/L 29   BUN mg/dL 40*   CREATININE mg/dL 6 52*   CALCIUM mg/dL 8 5     Lab Results   Component Value Date    CALCIUM 8 5 04/19/2022    PHOS 5 9 (H) 01/02/2021     I have personally reviewed the blood work as stated above and in my note  I have personally reviewed ER note

## 2022-04-19 NOTE — ASSESSMENT & PLAN NOTE
History of left-sided lacunar stroke in 2018  With questionable residual right sided weakness  On aspirin and Lipitor at home

## 2022-04-19 NOTE — TELEPHONE ENCOUNTER
Patient's son Edith Gaona called in (he is indeed on consent form)  States the patient is at [facility] currently and he would like for Dr Kajal White to review the case with him, and schedule urgent 2 week follow up  Son rather distraught and concerned for patient, notes he is nervous that inpatient team wants to start plavix and insert a loop recorder  Informed son that the team all works together and that the providers from neuro inpatient are Dr Joyce Caro colleagues  Also informed son that Dr Kajal White will not be outpatient in the office for quite some time  I offered to contact Dr Kajal White regarding his recommendation for an outpatient AP in the meantime  Son is agreeable  Best call back [phone]    TT sent to Dr Kajal White

## 2022-04-19 NOTE — ED PROVIDER NOTES
History  Chief Complaint   Patient presents with    Weakness - Generalized     per pt wife, pt woke up around 5 am this morning with nausea, sweating, and right sided weakness  hx stroke 6 years ago     Jey Garland is a 20-year-old male with PMH of stroke in 2018 leading to cognitive difficulties and chronic weakness which may be on his left or his right, that awoke this morning at 5:30 a m  With right-sided facial pain, nausea, vomiting and right arm and right leg numbness  The patient's last known normal was 9:00 p m  Last night  Patient and his wife cannot remember which side of his body was weak from his last stroke but they do state that it was not severely weak  The patient reports that his right-sided facial pain is improved but that his right arm and right leg still feel slightly more numb than the left  He has nausea and vomiting have improved  The patient has had no recent illnesses or injuries and otherwise feels in his normal state of health  Prior to Admission Medications   Prescriptions Last Dose Informant Patient Reported? Taking? BD Pen Needle Adina U/F 32G X 4 MM MISC  Spouse/Significant Other No No   Sig: USE TO INJECT INSULIN 4 TIMES DAILY   Blood Glucose Monitoring Suppl (True Metrix Meter) w/Device KIT  Spouse/Significant Other No No   Sig: Use to test blood sugars 3 times daily   Blood Pressure Monitoring (BLOOD PRESSURE CUFF) MISC  Spouse/Significant Other No No   Sig: Use to check blood pressure before taking blood pressure medication and 1 hour after and follow instructions provided in discharge instructions based on the readings     Cholecalciferol (Vitamin D3) 1 25 MG (66703 UT) CAPS  Spouse/Significant Other No No   Sig: TAKE 1 CAPSULE BY MOUTH ONE TIME PER WEEK   Continuous Blood Gluc  (DEXCOM G6 ) LANCE  Spouse/Significant Other No No   Sig: Use as directed for continuous glucose monitoring   Continuous Blood Gluc Sensor (DEXCOM G6 SENSOR) MISC Spouse/Significant Other No No   Sig: Use as directed for continuous glucose monitoring  Change every 10 days   Continuous Blood Gluc Transmit (DEXCOM G6 TRANSMITTER) MISC  Spouse/Significant Other No No   Sig: Use as directed for continuous glucose monitoring-Change every 3 months   Incontinence Supplies (MALE URINAL) MISC  Spouse/Significant Other No No   Sig: by Does not apply route daily   Insulin Syringe-Needle U-100 (B-D INS SYRINGE 0 5CC/30GX1/2") 30G X 1/2" 0 5 ML MISC  Spouse/Significant Other No No   Sig: Inject under the skin 4 (four) times a day   Lancets Ultra Thin 30G MISC  Spouse/Significant Other No No   Sig: Use 3 times a day   Lantus 100 UNIT/ML subcutaneous injection   No No   Sig: INJECT 14 UNITS UNDER THE SKIN DAILY   Nutritional Supplements (VITAMIN D BOOSTER PO)  Spouse/Significant Other Yes No   Sig: Take 0 5 mcg by mouth     ONETOUCH DELICA LANCETS 63C MISC  Spouse/Significant Other No No   Sig: by Does not apply route 3 (three) times a day   TORSEMIDE PO  Spouse/Significant Other Yes No   Sig: Take 50 mg by mouth Pt takes 50 mg daily on non- dialysis days: sat, sun, Tues, and thr   Pt takes 10 mg of torsemide daily on dialysis days      aspirin (ECOTRIN LOW STRENGTH) 81 mg EC tablet  Spouse/Significant Other Yes No   Sig: Take 81 mg by mouth daily Resume on      atorvastatin (LIPITOR) 40 mg tablet  Spouse/Significant Other No No   Sig: Take 1 tablet (40 mg total) by mouth daily with dinner   calcium acetate (CALPHRON) 667 mg  Spouse/Significant Other Yes No   Sig: TAKE 2 TABLETS BY MOUTH THREE TIMES DAILY WITH MEALS   carvedilol (COREG) 6 25 mg tablet  Spouse/Significant Other No No   Sig: Take 1 tablet (6 25 mg total) by mouth 2 (two) times a day with meals Or as directed by your kidney doctor   doxercalciferol (HECTOROL) 0 5 mcg capsule  Spouse/Significant Other Yes No   Si mcg   glucose blood (True Metrix Blood Glucose Test) test strip   No No   Sig: Use 1 each 3 (three) times a day Use as instructed   insulin lispro (HumaLOG KwikPen) 100 units/mL injection pen  Spouse/Significant Other No No   Sig: INJECT 4 UNITS 3 TIMES A DAY WITH MEALS PLUS SCALE (max daily dose 42 units)   meclizine (ANTIVERT) 25 mg tablet  Spouse/Significant Other No No   Sig: Take 1 tablet (25 mg total) by mouth every 8 (eight) hours as needed for dizziness or nausea      Facility-Administered Medications: None       Past Medical History:   Diagnosis Date    Cerebrovascular accident (CVA) due to thrombosis of left middle cerebral artery ([facility] ) 7/29/2018    Chronic kidney disease     Diabetes mellitus ([facility] )     GERD (gastroesophageal reflux disease)     Hypercholesteremia     Hyperlipidemia     Hypertension     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    Neuropathy     Obesity     Osteomyelitis ([facility] )     last assessed 11/4/16    PVC's (premature ventricular contractions)     sees cardiology Dr Ponce camargo    Stroke [facility])     last weeof July 2018 [facility]    TIA (transient ischemic attack) 10/28/2018       Past Surgical History:   Procedure Laterality Date    ABDOMINAL SURGERY      CHOLECYSTECTOMY      Percutaneous    COLONOSCOPY      CYSTOSCOPY      OTHER SURGICAL HISTORY      "stimulator to control bowel movements"    MN ESOPHAGOGASTRODUODENOSCOPY TRANSORAL DIAGNOSTIC N/A 9/27/2016    Procedure: ESOPHAGOGASTRODUODENOSCOPY (EGD); Surgeon: Wolfgang Cardenas MD;  [facility];   Service: Gastroenterology    MN LAP,CHOLECYSTECTOMY N/A 2/29/2016    Procedure: LAPAROSCOPIC CHOLECYSTECTOMY ;  Surgeon: Eduardo Avendano DO;  [facility];  Service: General    ROTATOR CUFF REPAIR Right     TOE AMPUTATION Right 10/28/2016    Procedure: 3RD TOE AMPUTATION ;  Surgeon: Dee Cody DPM;  [facility];  Service:        Family History   Problem Relation Age of Onset    Leukemia Mother     Liver disease Mother     Lung cancer Mother heavy smoker - 3 ppd    Heart disease Father     Liver disease Father     Multiple myeloma Sister     Breast cancer Sister     Urolithiasis Family     Alcohol abuse Neg Hx     Depression Neg Hx     Drug abuse Neg Hx     Substance Abuse Neg Hx     Mental illness Neg Hx      I have reviewed and agree with the history as documented  E-Cigarette/Vaping    E-Cigarette Use Never User      E-Cigarette/Vaping Substances    Nicotine No     THC No     CBD No     Flavoring No     Other No     Unknown No      Social History     Tobacco Use    Smoking status: Never Smoker    Smokeless tobacco: Never Used   Vaping Use    Vaping Use: Never used   Substance Use Topics    Alcohol use: Not Currently    Drug use: No        Review of Systems   Constitutional: Negative for chills and fever  HENT: Negative for ear pain and sore throat  Right-sided facial pain, resolved   Eyes: Negative for pain and visual disturbance  Respiratory: Negative for cough and shortness of breath  Cardiovascular: Negative for chest pain and palpitations  Gastrointestinal: Negative for abdominal pain, diarrhea, nausea and vomiting  Genitourinary: Negative for dysuria and hematuria  Musculoskeletal: Negative for arthralgias and back pain  Skin: Negative for color change and rash  Neurological: Positive for weakness (Chronic unilateral) and numbness ( right arm, right leg)  Negative for seizures, syncope, light-headedness and headaches  All other systems reviewed and are negative        Physical Exam  ED Triage Vitals [04/19/22 0558]   Temperature Pulse Respirations Blood Pressure SpO2   97 6 °F (36 4 °C) 80 18 (!) 173/77 99 %      Temp Source Heart Rate Source Patient Position - Orthostatic VS BP Location FiO2 (%)   Oral Monitor Sitting Right arm --      Pain Score       No Pain             Orthostatic Vital Signs  Vitals:    04/19/22 0645 04/19/22 0700 04/19/22 0715 04/19/22 0715   BP: 165/66 165/74 166/83 166/83   Pulse: 74 72 72    Patient Position - Orthostatic VS: Lying Lying Lying        Physical Exam  Vitals and nursing note reviewed  Constitutional:       General: He is in acute distress  Appearance: He is well-developed  He is not ill-appearing  HENT:      Head: Normocephalic and atraumatic  Right Ear: External ear normal       Left Ear: External ear normal       Nose: Nose normal       Mouth/Throat:      Mouth: Mucous membranes are moist       Pharynx: No oropharyngeal exudate or posterior oropharyngeal erythema  Eyes:      General: No scleral icterus  Extraocular Movements: Extraocular movements intact  Conjunctiva/sclera: Conjunctivae normal       Pupils: Pupils are equal, round, and reactive to light  Cardiovascular:      Rate and Rhythm: Normal rate and regular rhythm  Pulses: Normal pulses  Heart sounds: Normal heart sounds  No murmur heard  Pulmonary:      Effort: Pulmonary effort is normal  No respiratory distress  Breath sounds: Normal breath sounds  No wheezing, rhonchi or rales  Abdominal:      General: Abdomen is flat  Palpations: Abdomen is soft  Tenderness: There is no abdominal tenderness  There is no guarding or rebound  Musculoskeletal:         General: No swelling, tenderness or deformity  Normal range of motion  Cervical back: Normal range of motion and neck supple  Right lower leg: No edema  Left lower leg: No edema  Skin:     General: Skin is warm and dry  Capillary Refill: Capillary refill takes less than 2 seconds  Findings: No rash  Neurological:      Mental Status: He is alert  Mental status is at baseline  Cranial Nerves: No cranial nerve deficit  Sensory: Sensory deficit (Right arm, right leg decreased sensation) present  Motor: Weakness ( left leg) present           ED Medications  Medications   iohexol (OMNIPAQUE) 350 MG/ML injection (SINGLE-DOSE) 85 mL (85 mL Intravenous Given 4/19/22 0631)   aspirin tablet 325 mg (325 mg Oral Given 4/19/22 0702)   clopidogrel (PLAVIX) tablet 300 mg (300 mg Oral Given 4/19/22 0702)       Diagnostic Studies  Results Reviewed     Procedure Component Value Units Date/Time    HS Troponin I 4hr [[id number]]     Lab Status: No result Specimen: Blood     HS Troponin 0hr (reflex protocol) [[id number]]  (Abnormal) Collected: 04/19/22 0616    Lab Status: Final result Specimen: Blood from Arm, Right Updated: 04/19/22 0702     hs TnI 0hr 55 ng/L     HS Troponin I 2hr [[id number]]     Lab Status: No result Specimen: Blood     Basic metabolic panel [[id number]]  (Abnormal) Collected: 04/19/22 0616    Lab Status: Final result Specimen: Blood from Arm, Right Updated: 04/19/22 0646     Sodium 141 mmol/L      Potassium 4 2 mmol/L      Chloride 100 mmol/L      CO2 29 mmol/L      ANION GAP 12 mmol/L      BUN 40 mg/dL      Creatinine 6 52 mg/dL      Glucose 158 mg/dL      Calcium 8 5 mg/dL      eGFR 8 ml/min/1 73sq m     Narrative:      [place] guidelines for Chronic Kidney Disease (CKD):     Stage 1 with normal or high GFR (GFR > 90 mL/min/1 73 square meters)    Stage 2 Mild CKD (GFR = 60-89 mL/min/1 73 square meters)    Stage 3A Moderate CKD (GFR = 45-59 mL/min/1 73 square meters)    Stage 3B Moderate CKD (GFR = 30-44 mL/min/1 73 square meters)    Stage 4 Severe CKD (GFR = 15-29 mL/min/1 73 square meters)    Stage 5 End Stage CKD (GFR <15 mL/min/1 73 square meters)  Note: GFR calculation is accurate only with a steady state creatinine    Protime-INR [[id number]]  (Normal) Collected: 04/19/22 0616    Lab Status: Final result Specimen: Blood from Arm, Right Updated: 04/19/22 0642     Protime 13 4 seconds      INR 1 02    APTT [[id number]]  (Normal) Collected: 04/19/22 0616    Lab Status: Final result Specimen: Blood from Arm, Right Updated: 04/19/22 0642     PTT 30 seconds     CBC and Platelet [[id number]]  (Abnormal) Collected: 04/19/22 0616    Lab Status: Final result Specimen: Blood from Arm, Right Updated: 04/19/22 0632     WBC 6 01 Thousand/uL      RBC 3 51 Million/uL      Hemoglobin 10 8 g/dL      Hematocrit 33 4 %      MCV 95 fL      MCH 30 8 pg      MCHC 32 3 g/dL      RDW 14 2 %      Platelets 250 Thousands/uL      MPV 10 2 fL     Fingerstick Glucose (POCT) [[id number]]  (Abnormal) Collected: 04/19/22 0613    Lab Status: Final result Updated: 04/19/22 0616     POC Glucose 168 mg/dl                  CTA stroke alert (head/neck)   Final Result by Thomas Pierce MD (04/19 0700)      No acute intracranial abnormality  No large vessel occlusion  Moderate atherosclerotic disease with moderate stenosis of the cavernous right internal carotid artery and severe stenosis of the intracranial left vertebral artery  Focus of moderate stenosis at the proximal left M1 segment  Chronic truncation of the terminal left middle cerebral artery territory branches  Moderate stenosis of the proximal P1 segment of the left posterior cerebral artery  Mild to moderate atherosclerotic plaquing of the bilateral carotid bulbs causing no hemodynamically significant stenosis  I personally reported the findings to Sarah Rickettsida on 4/19/2022 at 6:44 AM             Workstation performed: [id number]         CT stroke alert brain   Final Result by Thomas Pierce MD (04/19 0645)      No acute intracranial abnormality  Stable chronic small vessel ischemic changes  I personally discussed this study with Sarah Sanon on 4/19/2022 at 6:44 AM                    Workstation performed: [id number]               Procedures  ECG 12 Lead Documentation Only    Date/Time: 4/19/2022 8:03 AM  Performed by: Suzan Bowman DO  Authorized by:  Suzan Bowman DO     Indications / Diagnosis:  Stroke symptoms  ECG reviewed by me, the ED Provider: yes    Patient location:  ED  Previous ECG:     Previous ECG:  Compared to current Comparison ECG info: Worsened T-wave inversion in inferior and lateral leads  New ST depression in lateral leads  Similarity:  Changes noted  Interpretation:     Interpretation: abnormal    Quality:     Tracing quality:  Limited by artifact  Rate:     ECG rate:  73    ECG rate assessment: normal    Rhythm:     Rhythm: sinus rhythm    Ectopy:     Ectopy: none    QRS:     QRS axis:  Normal    QRS intervals:  Normal  Conduction:     Conduction: abnormal      Abnormal conduction: complete RBBB    ST segments:     ST segments:  Abnormal    Depression:  V3, V4, V5, aVF, II and III  T waves:     T waves: inverted      Inverted:  V6, V5, V4, II, III, aVF and V3  Comments:      Normal sinus rhythm  Complete RBBB  ST depression in anterolateral leads  T-wave inversion in inferolateral leads  ED Course                  Stroke Assessment     Row Name 04/19/22 6424             NIH Stroke Scale    Interval Baseline      Level of Consciousness (1a ) 0      LOC Questions (1b ) 0      LOC Commands (1c ) 0      Best Gaze (2 ) 0      Visual (3 ) 0      Facial Palsy (4 ) 0      Motor Arm, Left (5a ) 0      Motor Arm, Right (5b ) 0      Motor Leg, Left (6a ) 1      Motor Leg, Right (6b ) 0      Limb Ataxia (7 ) 0      Sensory (8 ) 1      Best Language (9 ) 0      Dysarthria (10 ) 0      Extinction and Inattention (11 ) (Formerly Neglect) 0      Total 2                Most Recent Value   TPA Decision Options    TPA Decision Patient not a TPA candidate  Patient is not a candidate options Unclear time of onset outside appropriate time window , Symptoms resolved/clearly non disabling  MDM  Number of Diagnoses or Management Options  Numbness  Weakness  Diagnosis management comments: 62M presents the emergency department with new onset right-sided numbness and he possible new onset left-sided leg weakness  Patient's visual symptoms and nausea have improved    In light of new symptoms and stroke history a stroke alert is called  CT scan reveals no acute abnormalities and neurology recommends starting aspirin and Plavix and admitting the patient as part of the stroke pathway  The patient has elevated HS troponin which is consistent with patient's prior findings  Patient also has elevated creatinine and BUN as well as elevated glucose, all consistent with prior laboratory evaluations  The patient is admitted under the care of [facility]      Disposition  Final diagnoses:   Numbness   Weakness     Time reflects when diagnosis was documented in both MDM as applicable and the Disposition within this note     Time User Action Codes Description Comment    4/19/2022  6:12 AM Nuha Retort Add [R20 0] Numbness     4/19/2022  7:44 AM Nuha Retort Add [R53 1] Weakness     4/19/2022  7:44 AM Edith Bumps, Laverne Meckel [R53 1] Weakness     4/19/2022  7:44 AM Nuha Retort Add [R53 1] Weakness       ED Disposition     ED Disposition Condition Date/Time Comment    Admit Stable Tue Apr 19, 2022  7:44 AM Case was discussed with Franco Mc and the patient's admission status was agreed to be Admission Status: inpatient status to the service of Dr Alexy Hill   Follow-up Information    None         Patient's Medications   Discharge Prescriptions    No medications on file     No discharge procedures on file  PDMP Review       Value Time User    PDMP Reviewed  Yes 12/30/2020 10:40 PM Alexey Mcallister MD           ED Provider  Attending physically available and evaluated Sarah Arellano GUERLINE managed the patient along with the ED Attending      Electronically Signed by         Spring Diamond DO  04/19/22 8277

## 2022-04-19 NOTE — NURSING NOTE
Patient's daughter turned off medtronic back stimulator in anticipation of patient's scheduled MRI  She will we restart after the MRI  Patient and daughter both agreed that patient will be fine without it overnight as patient's daughter is leaving for the night and will return tomorrow  Will continue with plan of care       Mikey العلي RN

## 2022-04-19 NOTE — ASSESSMENT & PLAN NOTE
Lab Results   Component Value Date    HGBA1C 6 4 12/09/2021     Recent Labs     04/19/22  0613   POCGLU 168*     Blood Sugar Average: Last 72 hrs:  (P) 168   Will continue home insulin regimen, SSI, along with diabetic diet

## 2022-04-19 NOTE — ED ATTENDING ATTESTATION
4/19/2022  I, Kraig Cogan, MD, saw and evaluated the patient  I have discussed the patient with the resident/non-physician practitioner and agree with the resident's/non-physician practitioner's findings, Plan of Care, and MDM as documented in the resident's/non-physician practitioner's note, except where noted  All available labs and Radiology studies were reviewed  I was present for key portions of any procedure(s) performed by the resident/non-physician practitioner and I was immediately available to provide assistance  At this point I agree with the current assessment done in the Emergency Department  I have conducted an independent evaluation of this patient a history and physical is as follows:    ED Course         Critical Care Time  Procedures    Patient is a pleasant 58 yom who presents with sudden onset nausea/vomiting and right sided numbness  Patient with a prior history of stroke and chronic deficits  Patient and his family note that his decreased sensation in the right side is new  Otherwise no new focal neuro symptoms  No chest pain  He did notes some pain in the right side of the neck  MDM pleasant 58 yom, will rule out stroke, possible cardiac cause of symptoms, will admit for stroke pathway

## 2022-04-19 NOTE — SPEECH THERAPY NOTE
PT, OT & SLP consulted  Pt with report of R side weakness  MRI ordered  Pt was observed conversing with MD and feeding himself (food and liquid)  No deficits in communication or swallowing noted  Plan: Contiue to follow along and r/o recurrence or progression of sxs (r/o need for further evaluation)

## 2022-04-19 NOTE — PLAN OF CARE
Problem: MOBILITY - ADULT  Goal: Maintain or return to baseline ADL function  Description: INTERVENTIONS:  -  Assess patient's ability to carry out ADLs; assess patient's baseline for ADL function and identify physical deficits which impact ability to perform ADLs (bathing, care of mouth/teeth, toileting, grooming, dressing, etc )  - Assess/evaluate cause of self-care deficits   - Assess range of motion  - Assess patient's mobility; develop plan if impaired  - Assess patient's need for assistive devices and provide as appropriate  - Encourage maximum independence but intervene and supervise when necessary  - Involve family in performance of ADLs  - Assess for home care needs following discharge   - Consider OT consult to assist with ADL evaluation and planning for discharge  - Provide patient education as appropriate  Outcome: Progressing  Goal: Maintains/Returns to pre admission functional level  Description: INTERVENTIONS:  - Perform BMAT or MOVE assessment daily    - Set and communicate daily mobility goal to care team and patient/family/caregiver  - Collaborate with rehabilitation services on mobility goals if consulted  - Perform Range of Motion  times a day  - Reposition patient every  hours  - Dangle patient  times a day  - Stand patient  times a day  - Ambulate patient  times a day  - Out of bed to chair  times a day   - Out of bed for meals times a day  - Out of bed for toileting  - Record patient progress and toleration of activity level   Outcome: Progressing     Problem: Neurological Deficit  Goal: Neurological status is stable or improving  Description: Interventions:  - Monitor and assess patient's level of consciousness, motor function, sensory function, and level of assistance needed for ADLs  - Monitor and report changes from baseline  Collaborate with interdisciplinary team to initiate plan and implement interventions as ordered  - Provide and maintain a safe environment    - Consider seizure precautions  - Consider fall precautions  - Consider aspiration precautions  - Consider bleeding precautions  Outcome: Progressing     Problem: Activity Intolerance/Impaired Mobility  Goal: Mobility/activity is maintained at optimum level for patient  Description: Interventions:  - Assess and monitor patient  barriers to mobility and need for assistive/adaptive devices  - Assess patient's emotional response to limitations  - Collaborate with interdisciplinary team and initiate plans and interventions as ordered  - Encourage independent activity per ability   - Maintain proper body alignment  - Perform active/passive rom as tolerated/ordered  - Plan activities to conserve energy   - Turn patient as appropriate  Outcome: Progressing     Problem: Communication Impairment  Goal: Ability to express needs and understand communication  Description: Assess patient's communication skills and ability to understand information  Patient will demonstrate use of effective communication techniques, alternative methods of communication and understanding even if not able to speak  - Encourage communication and provide alternate methods of communication as needed  - Collaborate with case management/ for discharge needs  - Include patient/family/caregiver in decisions related to communication  Outcome: Progressing     Problem: Potential for Aspiration  Goal: Non-ventilated patient's risk of aspiration is minimized  Description: Assess and monitor vital signs, respiratory status, and labs (WBC)  Monitor for signs of aspiration (tachypnea, cough, rales, wheezing, cyanosis, fever)  - Assess and monitor patient's ability to swallow  - Place patient up in chair to eat if possible  - HOB up at 90 degrees to eat if unable to get patient up into chair   - Supervise patient during oral intake  - Instruct patient/ family to take small bites    - Instruct patient/ family to take small single sips when taking liquids  - Follow patient-specific strategies generated by speech pathologist   Outcome: Progressing  Goal: Ventilated patient's risk of aspiration is minimized  Description: Assess and monitor vital signs, respiratory status, airway cuff pressure, and labs (WBC)  Monitor for signs of aspiration (tachypnea, cough, rales, wheezing, cyanosis, fever)  - Elevate head of bed 30 degrees if patient has tube feeding   - Monitor tube feeding  Outcome: Progressing     Problem: Nutrition  Goal: Nutrition/Hydration status is improving  Description: Monitor and assess patient's nutrition/hydration status for malnutrition (ex- brittle hair, bruises, dry skin, pale skin and conjunctiva, muscle wasting, smooth red tongue, and disorientation)  Collaborate with interdisciplinary team and initiate plan and interventions as ordered  Monitor patient's weight and dietary intake as ordered or per policy  Utilize nutrition screening tool and intervene per policy  Determine patient's food preferences and provide high-protein, high-caloric foods as appropriate  - Assist patient with eating   - Allow adequate time for meals   - Encourage patient to take dietary supplement as ordered  - Collaborate with clinical nutritionist   - Include patient/family/caregiver in decisions related to nutrition    Outcome: Progressing

## 2022-04-19 NOTE — CONSULTS
NEUROLOGY RESIDENCY CONSULT NOTE     Name: Esther Tellez   Age & Sex: 58 y o  male   MRN: [id number]  Unit/Bed#: S -01   Encounter: [id number]  Length of Stay: 0    ASSESSMENT & PLAN     History of stroke  Assessment & Plan  Patient is a 70-year-old male with history of diabetes mellitus,CKD on dialysis MWF, hypertension, hyperlipidemia, and left lacunar ischemic stroke (2018) who presents with R sided paraesthesias, nausea and vomiting  Family reports extra fluid removal from dialysis session the day prior to presentation  Nausea and vomiting resolved after receiving morning insulin however paraesthesias persisted  Paraesthesias are described as involving the right bicep area, right abdomen and proximal lateral thigh which are not typical for a stroke distribution  Stroke alert called on arrival  NIHSS of 1-2 and LKW 9 pm day prior to going to bed  Initial BP on arrival was Blood Pressure: (!) 173/77  As a result of mild, nondisabling symptoms and being outside of the tpa window patient was determined to not be a candidate for tPA   BP over last 24 hours: Blood Pressure: 166/77  current BP: Blood Pressure: 166/77    HBA1C 6 0, LDL 25    For reference --> [place] spinal cord stimulatormodel # [id number]  Imaging:   CTH: Negative for any acute abnormalities    CTA: negative for LVO  There was moderate stenosis of the cavernous right ICA, proximal L M1, proximal R P1, severe stenosis of the intracranial left vertebral artery, chronic truncation of the terminal left middle cerebral artery territory branches and mild to moderate atherosclerotic plaquing of the bilateral carotid bulbs      MRI: pending    Echocardiogram: pending   Telemetry: Monitor for afib     Impression: low suspicion for stroke however will need workup given new L occipital stroke findings on recent CT imaging not present in 2020    Plan:   Stroke pathway   MRI to assess for acute ischemia   Goal BP: Permissive for 24 H  Loaded with 325 ASA and 300 plavix    Continue DAPT, will decide duration depending on MRI    If MRI does show new stroke will transition to plavix monotherapy    Will need close follow up with outpatient neurology, will defer decision for loop recorder to them   Continue Statin   Maintain glucose below 200   Echo pending   Neuro checks   Monitor on telemetry   Medical management as per primary team appreciated   PT/OT consults appreciated when able   Will need follow up in 2 weeks as outpatient    Dr Marie Post notified of current presentation, will keep him updated of inpatient findings         Anh Adorno will need follow up in in 2 weeks with neurovascular attending or AP   He will not require outpatient neurological testing  Schedule outpatient appt: This should be completed prior to removing patient from list or discharge     Pending for discharge: MRI     SUBJECTIVE     Reason for Consult / Principal Problem: Stroke    Hx and PE limited by: None     HPI: Anh Adorno is a 58 y o    right handed male with history of diabetes mellitus,CKD on dialysis MWF, hypertension, hyperlipidemia, and left lacunar ischemic stroke (2018) who presents with R sided paraesthesias, nausea and vomiting  Patient yesterday had his dialysis treatment  Family reports that more fluid was taken out given his increased water intake and swelling of the extremities  He went to bed with after effects of dialysis but otherwise in his baseline state of health at around 9 pm   This morning he woke up at around 5:30 am and started dry heaving  He developed a right unilateral headache with numbness of the right biceps, right abdomen and right lateral thigh  On arrival to the ED /77 with remainder of vitals normal  Stroke alert called  NIHSS 1-2  CTH negative for any acute abnormalities  CTA negative for LVO   There was moderate stenosis of the cavernous right ICA, proximal L M1, proximal R P1, severe stenosis of the intracranial left vertebral artery, chronic truncation of the terminal left middle cerebral artery territory branches and mild to moderate atherosclerotic plaquing of the bilateral carotid bulbs  There was noted to be a new L occipital stroke when compared to [facility] from 2020, present in [facility] from 2 weeks ago  Patient was not a tpa candidate given unknown LKW and mild disabling sx  It is unclear what side with residual weakness from previous stroke  Son states L side however his stroke was L lacunar  Wife states that three weeks ago he had a similar episode to today which resolved after giving insulin  This morning his blood sugar this morning was 174  He received insulin and reports feeling better  [place] noting that patient had a CTH 2 weeks ago for dizzines after stopping zoloft, which showed a new chronic  L occipital stroke since previous imaging done in 2020             Inpatient consult to Neurology  Consult performed by: Jovanna Henning MD  Consult ordered by: Bren Perez MD          Inpatient consult to Neurology     Performed by  Jovanna Henning MD     Authorized by Bren Perez MD              Historical Information   Past Medical History:   Diagnosis Date    Cerebrovascular accident (CVA) due to thrombosis of left middle cerebral artery ([facility] [address] ) 7/29/2018    Chronic kidney disease     Diabetes mellitus ([facility] [address] )     GERD (gastroesophageal reflux disease)     Hypercholesteremia     Hyperlipidemia     Hypertension     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    Neuropathy     Obesity     Osteomyelitis ([facility] [address] )     last assessed 11/4/16    PVC's (premature ventricular contractions)     sees cardiology Dr Sumi camargo    Stroke [facility])     last weeof July 2018 [address] [facility]    TIA (transient ischemic attack) 10/28/2018     Past Surgical History:   Procedure Laterality Date    ABDOMINAL SURGERY      CHOLECYSTECTOMY      Percutaneous    COLONOSCOPY      CYSTOSCOPY  OTHER SURGICAL HISTORY      "stimulator to control bowel movements"    TN ESOPHAGOGASTRODUODENOSCOPY TRANSORAL DIAGNOSTIC N/A 9/27/2016    Procedure: ESOPHAGOGASTRODUODENOSCOPY (EGD); Surgeon: Abbey Moura MD;  [facility]; Service: Gastroenterology    TN LAP,CHOLECYSTECTOMY N/A 2/29/2016    Procedure: LAPAROSCOPIC CHOLECYSTECTOMY ;  Surgeon: Nicolas Avery DO;  [facility];  Service: General    ROTATOR CUFF REPAIR Right     TOE AMPUTATION Right 10/28/2016    Procedure: 3RD TOE AMPUTATION ;  Surgeon: Susana Monae DPM;  [facility];  Service:      Social History   Social History     Substance and Sexual Activity   Alcohol Use Not Currently     Social History     Substance and Sexual Activity   Drug Use No     E-Cigarette/Vaping    E-Cigarette Use Never User      E-Cigarette/Vaping Substances    Nicotine No     THC No     CBD No     Flavoring No     Other No     Unknown No      Social History     Tobacco Use   Smoking Status Never Smoker   Smokeless Tobacco Never Used     Family History: non-contributory  Meds/Allergies   all current active meds have been reviewed  No Known Allergies    Review of previous medical records was  completed  Review of Systems   Constitutional: Negative for fever  Eyes: Negative for visual disturbance  Respiratory: Negative for shortness of breath  Gastrointestinal: Positive for vomiting  Musculoskeletal: Negative for gait problem  Neurological: Positive for numbness  Negative for weakness  Psychiatric/Behavioral: Negative for confusion  OBJECTIVE     Patient ID: Bruno Sanchez is a 58 y o  male      Vitals:   Vitals:    04/19/22 1300 04/19/22 1400 04/19/22 1536 04/19/22 1807   BP: 148/69 159/75 136/63 166/77   BP Location:  Right arm Right arm Right arm   Pulse: 74 76 75 75   Resp: 16 18 18 18   Temp:  97 8 °F (36 6 °C) 98 5 °F (36 9 °C) 98 4 °F (36 9 °C)   TempSrc:  Oral Oral Oral   SpO2:  96% 93% 96% Weight:       Height:  5' 9" (1 753 m)        Body mass index is 35 39 kg/m²  No intake or output data in the 24 hours ending 22    Temperature:   Temp (24hrs), Av 1 °F (36 7 °C), Min:97 6 °F (36 4 °C), Max:98 5 °F (36 9 °C)    Temperature: 98 4 °F (36 9 °C)    Invasive Devices: Invasive Devices  Report    Peripheral Intravenous Line            Peripheral IV 22 Right Antecubital <1 day          Line            Hemodialysis AV Fistula Left Upper arm -- days                Physical Exam  Neurological:      Coordination: Finger-Nose-Finger Test normal       Gait: Gait is intact  Neurologic Exam     Mental Status   Oriented to month and year, not date   Recurrently seeking wife for reassurance   Able to name objects   Dysarthric speech, reportedly residual ffrom previous stroke      Cranial Nerves     CN II   Visual fields full to confrontation  CN VII   Facial expression full, symmetric  CN VIII   CN VIII normal      CN IX, X   CN IX normal    CN X normal      CN XI   CN XI normal      CN XII   CN XII normal    Increase sensation to pinprick along R V1 distribution, otherwise sensation symmetric        Motor Exam   Right arm pronator drift: absent  Left arm pronator drift: absent    Strength   Right deltoid: 5/5  Left deltoid: 5/5  Right biceps: 5/5  Left biceps: 5/5  Right triceps: 5/5  Left triceps: 5/5  Right quadriceps: 4/5  Left quadriceps: 4/5  Right anterior tibial: 5/5  Left anterior tibial: 5/5  Right gastroc: 5/5  Left gastroc: 5/5    Sensory Exam   Pinprick normal      Gait, Coordination, and Reflexes     Gait  Gait: normal    Coordination   Finger to nose coordination: normalDiminished reflexes throughout   Some unsteadiness initiating gait however able to ambulate without assistance         LABORATORY DATA     Labs: I have personally reviewed pertinent reports      Results from last 7 days   Lab Units 22  0616   WBC Thousand/uL 6 01   HEMOGLOBIN g/dL 10 8* HEMATOCRIT % 33 4*   PLATELETS Thousands/uL 154      Results from last 7 days   Lab Units 04/19/22  0616   POTASSIUM mmol/L 4 2   CHLORIDE mmol/L 100   CO2 mmol/L 29   BUN mg/dL 40*   CREATININE mg/dL 6 52*   CALCIUM mg/dL 8 5              Results from last 7 days   Lab Units 04/19/22  0616   INR  1 02   PTT seconds 30               IMAGING & DIAGNOSTIC TESTING     Radiology Results: I have personally reviewed pertinent reports  CTA stroke alert (head/neck)   Final Result by Maren Odell MD (04/19 0700)      No acute intracranial abnormality  No large vessel occlusion  Moderate atherosclerotic disease with moderate stenosis of the cavernous right internal carotid artery and severe stenosis of the intracranial left vertebral artery  Focus of moderate stenosis at the proximal left M1 segment  Chronic truncation of the terminal left middle cerebral artery territory branches  Moderate stenosis of the proximal P1 segment of the left posterior cerebral artery  Mild to moderate atherosclerotic plaquing of the bilateral carotid bulbs causing no hemodynamically significant stenosis  I personally reported the findings to Sarah Kulkarni Saulo Kearns on 4/19/2022 at 6:44 AM             Workstation performed: [id number]         CT stroke alert brain   Final Result by Maren Odell MD (04/19 0645)      No acute intracranial abnormality  Stable chronic small vessel ischemic changes  I personally discussed this study with Sarah Vernon Urmila on 4/19/2022 at 6:44 AM                    Workstation performed: [id number]         MRI brain wo contrast    (Results Pending)       Other Diagnostic Testing: I have personally reviewed pertinent reports        ACTIVE MEDICATIONS     Current Facility-Administered Medications   Medication Dose Route Frequency    aluminum-magnesium hydroxide-simethicone (MYLANTA) oral suspension 30 mL  30 mL Oral Q4H PRN    [START ON 4/20/2022] aspirin chewable tablet 81 mg  81 mg Oral Daily    atorvastatin (LIPITOR) tablet 40 mg  40 mg Oral Daily With Dinner    calcium acetate (PHOSLO) capsule 1,334 mg  1,334 mg Oral TID With Meals    calcium carbonate (TUMS) chewable tablet 500 mg  500 mg Oral Daily PRN    [START ON 4/20/2022] carvedilol (COREG) tablet 6 25 mg  6 25 mg Oral BID With Meals    [START ON 4/20/2022] clopidogrel (PLAVIX) tablet 75 mg  75 mg Oral Daily    doxercalciferol (HECTOROL) injection 5 mcg  5 mcg Intravenous After Dialysis    heparin (porcine) subcutaneous injection 5,000 Units  5,000 Units Subcutaneous Q8H [address]    insulin glargine (LANTUS) subcutaneous injection 14 Units 0 14 mL  14 Units Subcutaneous Daily    insulin lispro (HumaLOG) 100 units/mL subcutaneous injection 1-6 Units  1-6 Units Subcutaneous 4x Daily (AC & HS)    insulin lispro (HumaLOG) 100 units/mL subcutaneous injection 4 Units  4 Units Subcutaneous TID With Meals    [START ON 4/20/2022] iron sucrose (VENOFER) 50 mg in sodium chloride 0 9 % 100 mL IVPB  50 mg Intravenous Weekly    [START ON 4/20/2022] torsemide (DEMADEX) tablet 10 mg  10 mg Oral Once per day on Mon Wed Fri    torsemide [facility]) tablet 50 mg  50 mg Oral Once per day on Sun Tue Thu Sat       Prior to Admission medications    Medication Sig Start Date End Date Taking?  Authorizing Provider   aspirin (ECOTRIN LOW STRENGTH) 81 mg EC tablet Take 81 mg by mouth daily Resume on 8/14      Historical Provider, MD   atorvastatin (LIPITOR) 40 mg tablet Take 1 tablet (40 mg total) by mouth daily with dinner 12/13/21   Raj Branch,    BD Pen Needle Adina U/F 32G X 4 MM MISC USE TO INJECT INSULIN 4 TIMES DAILY 5/19/21   Lissette Brennan PA-C   Blood Glucose Monitoring Suppl (True Metrix Meter) w/Device KIT Use to test blood sugars 3 times daily 3/28/22   Lissette Brennan PA-C   Blood Pressure Monitoring (BLOOD PRESSURE CUFF) MISC Use to check blood pressure before taking blood pressure medication and 1 hour after and follow instructions provided in discharge instructions based on the readings  8/13/18   Sixto Joseph MD   calcium acetate (CALPHRON) 667 mg TAKE 2 TABLETS BY MOUTH THREE TIMES DAILY WITH MEALS 10/17/21   Historical Provider, MD   carvedilol (COREG) 6 25 mg tablet Take 1 tablet (6 25 mg total) by mouth 2 (two) times a day with meals Or as directed by your kidney doctor 2/3/22   Vinicius Kim DO   Cholecalciferol (Vitamin D3) 1 25 MG (75627 UT) CAPS TAKE 1 CAPSULE BY MOUTH ONE TIME PER WEEK 10/6/21   Monique Arias MD   Continuous Blood Gluc  (DEXCOM G6 ) LANCE Use as directed for continuous glucose monitoring 10/28/19   Lissette Brennan PA-C   Continuous Blood Gluc Sensor (DEXCOM G6 SENSOR) MISC Use as directed for continuous glucose monitoring   Change every 10 days 10/28/19   Lissette Brennan PA-C   Continuous Blood Gluc Transmit (DEXCOM G6 TRANSMITTER) MISC Use as directed for continuous glucose monitoring-Change every 3 months 10/28/19   Lissette Brennan PA-C   doxercalciferol (HECTOROL) 0 5 mcg capsule 4 mcg 11/10/21 11/9/22  Historical Provider, MD   glucose blood (True Metrix Blood Glucose Test) test strip Use 1 each 3 (three) times a day Use as instructed 4/12/22   Krissy Boykin MD   Incontinence Supplies (MALE URINAL) MISC by Does not apply route daily 9/24/18   Raj Warren DO   insulin lispro (HumaLOG KwikPen) 100 units/mL injection pen INJECT 4 UNITS 3 TIMES A DAY WITH MEALS PLUS SCALE (max daily dose 42 units) 4/7/22   Krissy Boykin MD   Insulin Syringe-Needle U-100 (B-D INS SYRINGE 0 5CC/30GX1/2") 30G X 1/2" 0 5 ML MISC Inject under the skin 4 (four) times a day 6/5/20   Krissy Boykin MD   Lancets Ultra Thin 30G MISC Use 3 times a day 3/28/22   Lissette Brennan PA-C   Lantus 100 UNIT/ML subcutaneous injection INJECT 14 UNITS UNDER THE SKIN DAILY 4/13/22   Lissette D Rana, PA-C   meclizine (ANTIVERT) 25 mg tablet Take 1 tablet (25 mg total) by mouth every 8 (eight) hours as needed for dizziness or nausea 4/7/22   Patience Pyle MD   Nutritional Supplements (VITAMIN D BOOSTER PO) Take 0 5 mcg by mouth   6/21/21 6/20/22  Historical Provider, MD Gabbi Briseno LANCETS 59X MISC by Does not apply route 3 (three) times a day 11/27/18   Raj Auguste DO   TORSEMIDE PO Take 50 mg by mouth Pt takes 50 mg daily on non- dialysis days: sat, sun, Tues, and thrusday   Pt takes 10 mg of torsemide daily on dialysis days m- w- f   10/25/21   Historical Provider, MD         CODE STATUS & ADVANCED DIRECTIVES     Code Status: Level 1 - Full Code  Advance Directive and Living Will:      Power of :    POLST:        VTE Pharmacologic Prophylaxis: Heparin  VTE Mechanical Prophylaxis: sequential compression device    ==  MD [facility]rology Residency, PGY-3

## 2022-04-19 NOTE — ASSESSMENT & PLAN NOTE
Patient presents with right sided pain/weakness this morning, was normal last night   EKG in the ED without Afib, CT head and neck shows stable chronic small-vessel ischemic changes and moderate atherosclerotic disease  Neurology consulted, recommend admitting along stroke pathway  MRI ordered  Lipid panel and A1c are pending  Recent stress test done this month  Neurochecks

## 2022-04-19 NOTE — ASSESSMENT & PLAN NOTE
Patient is a 27-year-old male with history of diabetes mellitus,CKD on dialysis MWF, hypertension, hyperlipidemia, and left lacunar ischemic stroke (2018) who presents with R sided paraesthesias, nausea and vomiting  Family reports extra fluid removal from dialysis session the day prior to presentation  Nausea and vomiting resolved after receiving morning insulin however paraesthesias persisted  Paraesthesias are described as involving the right bicep area, right abdomen and proximal lateral thigh which are not typical for a stroke distribution  Stroke alert called on arrival  NIHSS of 1-2 and LKW 9 pm day prior to going to bed  Initial BP on arrival was Blood Pressure: (!) 173/77  As a result of mild, nondisabling symptoms and being outside of the tpa window patient was determined to not be a candidate for tPA   BP over last 24 hours: Blood Pressure: 166/77  current BP: Blood Pressure: 166/77    HBA1C 6 0, LDL 25    For reference --> [employer] spinal cord stimulatormodel # [id number]  Imaging:   CTH: Negative for any acute abnormalities    CTA: negative for LVO  There was moderate stenosis of the cavernous right ICA, proximal L M1, proximal R P1, severe stenosis of the intracranial left vertebral artery, chronic truncation of the terminal left middle cerebral artery territory branches and mild to moderate atherosclerotic plaquing of the bilateral carotid bulbs   MRI: Limited exam due to significant patient motion artifact  No evidence of acute infarct   Echocardiogram: Abnormal echo with mild dilation of bilateral atria    Telemetry: Monitor for afib     Impression: No acute stroke however will need loop recorder given new L occipital stroke findings on recent CT imaging not present in 2020    Plan:   Goal BP: Normaotension    DC plavix, continue ASA monotherapy   Outpatient loop recorder   SLIM team to place order    Continue Statin   Monitor on telemetry  [facility] management as per primary team appreciated   Dr Leopoldo Kiss notified of MRI findings, recommending follow up with AP in 2 weeks     Discussed plan with patient and son    No further inpatient neuro recommendations

## 2022-04-20 ENCOUNTER — APPOINTMENT (INPATIENT)
Dept: NON INVASIVE DIAGNOSTICS | Facility: HOSPITAL | Age: 62
DRG: 091 | End: 2022-04-20
Payer: MEDICARE

## 2022-04-20 ENCOUNTER — APPOINTMENT (INPATIENT)
Dept: MRI IMAGING | Facility: HOSPITAL | Age: 62
DRG: 091 | End: 2022-04-20
Payer: MEDICARE

## 2022-04-20 ENCOUNTER — APPOINTMENT (INPATIENT)
Dept: DIALYSIS | Facility: HOSPITAL | Age: 62
DRG: 091 | End: 2022-04-20
Payer: MEDICARE

## 2022-04-20 ENCOUNTER — APPOINTMENT (INPATIENT)
Dept: RADIOLOGY | Facility: HOSPITAL | Age: 62
DRG: 091 | End: 2022-04-20
Payer: MEDICARE

## 2022-04-20 LAB
ANION GAP SERPL CALCULATED.3IONS-SCNC: 12 MMOL/L (ref 4–13)
AORTIC ROOT: 4.1 CM
AORTIC VALVE MEAN VELOCITY: 16.6 M/S
APICAL FOUR CHAMBER EJECTION FRACTION: 54 %
ASCENDING AORTA: 4 CM (ref 2.19–3.28)
AV AREA BY CONTINUOUS VTI: 2 CM2
AV AREA PEAK VELOCITY: 2 CM2
AV LVOT MEAN GRADIENT: 1 MMHG
AV LVOT PEAK GRADIENT: 2 MMHG
AV MEAN GRADIENT: 12 MMHG
AV PEAK GRADIENT: 20 MMHG
AV REGURGITATION PRESSURE HALF TIME: 306 MS
AV VALVE AREA: 2.04 CM2
AVA (PLAN): 2.5 CM2
BUN SERPL-MCNC: 56 MG/DL (ref 5–25)
CALCIUM SERPL-MCNC: 8.6 MG/DL (ref 8.3–10.1)
CHLORIDE SERPL-SCNC: 102 MMOL/L (ref 100–108)
CO2 SERPL-SCNC: 24 MMOL/L (ref 21–32)
CREAT SERPL-MCNC: 7.94 MG/DL (ref 0.6–1.3)
DOP CALC AO VTI: 51.71 CM
DOP CALC LVOT AREA: 6.15 CM2
DOP CALC LVOT DIAMETER: 2.8 CM
DOP CALC LVOT PEAK VEL VTI: 17.12 CM
DOP CALC LVOT PEAK VEL: 0.75 M/S
DOP CALC LVOT STROKE INDEX: 46.6 ML/M2
DOP CALC LVOT STROKE VOLUME: 105.36 CM3
DOP CALC RVOT PEAK VEL: 0.7 M/S
E WAVE DECELERATION TIME: 240 MS
ERYTHROCYTE [DISTWIDTH] IN BLOOD BY AUTOMATED COUNT: 14.1 % (ref 11.6–15.1)
FRACTIONAL SHORTENING: 29 % (ref 28–44)
GFR SERPL CREATININE-BSD FRML MDRD: 6 ML/MIN/1.73SQ M
GLUCOSE SERPL-MCNC: 109 MG/DL (ref 65–140)
GLUCOSE SERPL-MCNC: 135 MG/DL (ref 65–140)
GLUCOSE SERPL-MCNC: 147 MG/DL (ref 65–140)
GLUCOSE SERPL-MCNC: 159 MG/DL (ref 65–140)
GLUCOSE SERPL-MCNC: 189 MG/DL (ref 65–140)
HCT VFR BLD AUTO: 31.6 % (ref 36.5–49.3)
HGB BLD-MCNC: 10.1 G/DL (ref 12–17)
INTERVENTRICULAR SEPTUM IN DIASTOLE (PARASTERNAL SHORT AXIS VIEW): 1.3 CM
INTERVENTRICULAR SEPTUM: 1.3 CM (ref 0.57–1.06)
LEFT ATRIUM AREA SYSTOLE SINGLE PLANE A4C: 23.8 CM2
LEFT ATRIUM SIZE: 5.1 CM
LEFT INTERNAL DIMENSION IN SYSTOLE: 4.1 CM (ref 4.71–7.13)
LEFT VENTRICULAR INTERNAL DIMENSION IN DIASTOLE: 5.8 CM (ref 7.89–11.76)
LEFT VENTRICULAR POSTERIOR WALL IN END DIASTOLE: 1.2 CM (ref 0.55–1.05)
LEFT VENTRICULAR STROKE VOLUME: 92 ML
LVSV (TEICH): 92 ML
MCH RBC QN AUTO: 30.4 PG (ref 26.8–34.3)
MCHC RBC AUTO-ENTMCNC: 32 G/DL (ref 31.4–37.4)
MCV RBC AUTO: 95 FL (ref 82–98)
MV E'TISSUE VEL-SEP: 5 CM/S
MV PEAK A VEL: 1.01 M/S
MV PEAK E VEL: 77 CM/S
MV STENOSIS PRESSURE HALF TIME: 70 MS
MV VALVE AREA P 1/2 METHOD: 3.14 CM2
PLATELET # BLD AUTO: 134 THOUSANDS/UL (ref 149–390)
PMV BLD AUTO: 10.1 FL (ref 8.9–12.7)
POTASSIUM SERPL-SCNC: 4.6 MMOL/L (ref 3.5–5.3)
PV PEAK GRADIENT: 4 MMHG
RBC # BLD AUTO: 3.32 MILLION/UL (ref 3.88–5.62)
RIGHT ATRIUM AREA SYSTOLE A4C: 19.9 CM2
RIGHT VENTRICLE ID DIMENSION: 4.5 CM
SL CV AV DECELERATION TIME RETROGRADE: 1054 MS
SL CV AV PEAK GRADIENT RETROGRADE: 53 MMHG
SL CV LV EF: 65
SL CV PED ECHO LEFT VENTRICLE DIASTOLIC VOLUME (MOD BIPLANE) 2D: 165 ML
SL CV PED ECHO LEFT VENTRICLE SYSTOLIC VOLUME (MOD BIPLANE) 2D: 74 ML
SL CV RVOT MAX GRADIENT: 2 MMHG
SODIUM SERPL-SCNC: 138 MMOL/L (ref 136–145)
TRICUSPID VALVE PEAK REGURGITATION VELOCITY: 1.74 M/S
WBC # BLD AUTO: 5.73 THOUSAND/UL (ref 4.31–10.16)
Z-SCORE OF ASCENDING AORTA: 4.58
Z-SCORE OF INTERVENTRICULAR SEPTUM IN END DIASTOLE: 3.84
Z-SCORE OF LEFT VENTRICULAR DIMENSION IN END DIASTOLE: -5.07
Z-SCORE OF LEFT VENTRICULAR DIMENSION IN END SYSTOLE: -2.73
Z-SCORE OF LEFT VENTRICULAR POSTERIOR WALL IN END DIASTOLE: 3.16

## 2022-04-20 PROCEDURE — 82948 REAGENT STRIP/BLOOD GLUCOSE: CPT

## 2022-04-20 PROCEDURE — 93306 TTE W/DOPPLER COMPLETE: CPT

## 2022-04-20 PROCEDURE — 70551 MRI BRAIN STEM W/O DYE: CPT

## 2022-04-20 PROCEDURE — 99232 SBSQ HOSP IP/OBS MODERATE 35: CPT | Performed by: STUDENT IN AN ORGANIZED HEALTH CARE EDUCATION/TRAINING PROGRAM

## 2022-04-20 PROCEDURE — 99232 SBSQ HOSP IP/OBS MODERATE 35: CPT | Performed by: PSYCHIATRY & NEUROLOGY

## 2022-04-20 PROCEDURE — 99232 SBSQ HOSP IP/OBS MODERATE 35: CPT | Performed by: PHYSICIAN ASSISTANT

## 2022-04-20 PROCEDURE — G1004 CDSM NDSC: HCPCS

## 2022-04-20 PROCEDURE — 85027 COMPLETE CBC AUTOMATED: CPT | Performed by: INTERNAL MEDICINE

## 2022-04-20 PROCEDURE — 80048 BASIC METABOLIC PNL TOTAL CA: CPT | Performed by: PHYSICIAN ASSISTANT

## 2022-04-20 PROCEDURE — 5A1D70Z PERFORMANCE OF URINARY FILTRATION, INTERMITTENT, LESS THAN 6 HOURS PER DAY: ICD-10-PCS | Performed by: STUDENT IN AN ORGANIZED HEALTH CARE EDUCATION/TRAINING PROGRAM

## 2022-04-20 PROCEDURE — 87081 CULTURE SCREEN ONLY: CPT | Performed by: INTERNAL MEDICINE

## 2022-04-20 PROCEDURE — 93306 TTE W/DOPPLER COMPLETE: CPT | Performed by: INTERNAL MEDICINE

## 2022-04-20 RX ADMIN — CALCIUM ACETATE 1334 MG: 667 CAPSULE ORAL at 15:48

## 2022-04-20 RX ADMIN — ASPIRIN 81 MG CHEWABLE TABLET 81 MG: 81 TABLET CHEWABLE at 08:38

## 2022-04-20 RX ADMIN — TORSEMIDE 10 MG: 10 TABLET ORAL at 08:40

## 2022-04-20 RX ADMIN — CARVEDILOL 6.25 MG: 6.25 TABLET, FILM COATED ORAL at 15:48

## 2022-04-20 RX ADMIN — CALCIUM ACETATE 1334 MG: 667 CAPSULE ORAL at 12:56

## 2022-04-20 RX ADMIN — INSULIN LISPRO 1 UNITS: 100 INJECTION, SOLUTION INTRAVENOUS; SUBCUTANEOUS at 08:33

## 2022-04-20 RX ADMIN — IRON SUCROSE 50 MG: 20 INJECTION, SOLUTION INTRAVENOUS at 08:41

## 2022-04-20 RX ADMIN — CALCIUM ACETATE 1334 MG: 667 CAPSULE ORAL at 08:41

## 2022-04-20 RX ADMIN — CLOPIDOGREL BISULFATE 75 MG: 75 TABLET ORAL at 08:41

## 2022-04-20 RX ADMIN — INSULIN LISPRO 4 UNITS: 100 INJECTION, SOLUTION INTRAVENOUS; SUBCUTANEOUS at 08:34

## 2022-04-20 RX ADMIN — HEPARIN SODIUM 5000 UNITS: 5000 INJECTION INTRAVENOUS; SUBCUTANEOUS at 14:22

## 2022-04-20 RX ADMIN — ATORVASTATIN CALCIUM 40 MG: 40 TABLET, FILM COATED ORAL at 15:48

## 2022-04-20 RX ADMIN — HEPARIN SODIUM 5000 UNITS: 5000 INJECTION INTRAVENOUS; SUBCUTANEOUS at 23:02

## 2022-04-20 RX ADMIN — INSULIN LISPRO 4 UNITS: 100 INJECTION, SOLUTION INTRAVENOUS; SUBCUTANEOUS at 18:38

## 2022-04-20 RX ADMIN — INSULIN LISPRO 1 UNITS: 100 INJECTION, SOLUTION INTRAVENOUS; SUBCUTANEOUS at 18:37

## 2022-04-20 RX ADMIN — INSULIN GLARGINE 14 UNITS: 100 INJECTION, SOLUTION SUBCUTANEOUS at 08:38

## 2022-04-20 RX ADMIN — CARVEDILOL 6.25 MG: 6.25 TABLET, FILM COATED ORAL at 08:39

## 2022-04-20 RX ADMIN — HEPARIN SODIUM 5000 UNITS: 5000 INJECTION INTRAVENOUS; SUBCUTANEOUS at 05:12

## 2022-04-20 RX ADMIN — INSULIN LISPRO 4 UNITS: 100 INJECTION, SOLUTION INTRAVENOUS; SUBCUTANEOUS at 12:20

## 2022-04-20 RX ADMIN — DOXERCALCIFEROL 5 MCG: 4 INJECTION, SOLUTION INTRAVENOUS at 13:02

## 2022-04-20 NOTE — ASSESSMENT & PLAN NOTE
Lab Results   Component Value Date    EGFR 6 04/20/2022    EGFR 8 04/19/2022    EGFR 7 04/07/2022    CREATININE 7 94 (H) 04/20/2022    CREATININE 6 52 (H) 04/19/2022    CREATININE 6 80 (H) 04/07/2022   On hemodialysis on Monday Wednesday Friday  Nephrology consulted  Appears otherwise euvolemic

## 2022-04-20 NOTE — OCCUPATIONAL THERAPY NOTE
Occupational Therapy Cancellation       04/20/22 1103   OT Last Visit   OT Visit Date 04/20/22   Note Type   Note type Cancelled Session   Cancel Reasons Other   Additional Comments OT orders received and chart review performed  Spoke to nursing who reports pt getting setup for dialysis  Will continue to follow and see pt as appropriate and as schedule allows       Raza Nolan OT

## 2022-04-20 NOTE — PROGRESS NOTES
NEUROLOGY RESIDENCY PROGRESS NOTE     Name: Godwin Raymond   Age & Sex: 58 y o  male   MRN: [id number]  Unit/Bed#: S -01   Encounter: [id number]    Godwin Raymond will need follow up in in 2 weeks with neurovascular attending (Follows with Dr Marco A Westfall)   He will not require outpatient neurological testing  Schedule follow up: This should be completed prior to removing patient from list or discharge     Pending for discharge: MRI, however this shouldn't prolong stay beyond tomorrow     ASSESSMENT & PLAN     History of stroke  Assessment & Plan  Patient is a 26-year-old male with history of diabetes mellitus,CKD on dialysis MWF, hypertension, hyperlipidemia, and left lacunar ischemic stroke (2018) who presents with R sided paraesthesias, nausea and vomiting  Family reports extra fluid removal from dialysis session the day prior to presentation  Nausea and vomiting resolved after receiving morning insulin however paraesthesias persisted  Paraesthesias are described as involving the right bicep area, right abdomen and proximal lateral thigh which are not typical for a stroke distribution  Stroke alert called on arrival  NIHSS of 1-2 and LKW 9 pm day prior to going to bed  Initial BP on arrival was Blood Pressure: (!) 173/77  As a result of mild, nondisabling symptoms and being outside of the tpa window patient was determined to not be a candidate for tPA   BP over last 24 hours: Blood Pressure: 166/77  current BP: Blood Pressure: 166/77    HBA1C 6 0, LDL 25    For reference --> [employer] spinal cord stimulatormodel # [id number]  Imaging:   CTH: Negative for any acute abnormalities    CTA: negative for LVO   There was moderate stenosis of the cavernous right ICA, proximal L M1, proximal R P1, severe stenosis of the intracranial left vertebral artery, chronic truncation of the terminal left middle cerebral artery territory branches and mild to moderate atherosclerotic plaquing of the bilateral carotid bulbs   MRI: pending    Echocardiogram: Abnormal echo with mild dilation of bilateral atria    Telemetry: Monitor for afib     Impression: low suspicion for stroke however will need workup given new L occipital stroke findings on recent CT imaging not present in 2020    Plan:   Stroke pathway   MRI to assess for acute ischemia, pending med[employer] rep device interrogation given malfunction   Goal BP: Normalize    Loaded with 325 ASA and 300 plavix    Continue DAPT, will decide duration depending on MRI    If MRI does show new stroke will transition to plavix monotherapy    Will need close follow up with outpatient neurology, will defer decision for loop recorder to them   Continue Statin   Maintain glucose below 200   Neuro checks   Monitor on telemetry   Medical management as per primary team appreciated   PT/OT consults appreciated when able   Will need follow up in 2 weeks as outpatient    Dr Balderrama January notified of current presentation, will keep him updated of inpatient findings               SUBJECTIVE     Patient was seen and examined  No acute events overnight  Patient reports resolution of paraesthesias  Headache resolved  Denies any weakness  Malfunction of spinal cord stimulator  Review of Systems   Constitutional: Negative for chills and fever  Musculoskeletal: Negative for gait problem  Neurological: Negative for speech difficulty, weakness and headaches  Psychiatric/Behavioral: Negative for confusion  The patient is not nervous/anxious  OBJECTIVE     Patient ID: Koby Chun is a 58 y o  male      Vitals:    22 1300 22 1315 22 1338 22 1500   BP: 155/86 125/68 133/70 150/69   BP Location:   Right arm Right arm   Pulse: 89 83 81 78   Resp: 16 16 15    Temp:    98 9 °F (37 2 °C)   TempSrc:    Oral   SpO2:  94%  97%   Weight:       Height:          Temperature:   Temp (24hrs), Av 3 °F (36 8 °C), Min:97 8 °F (36 6 °C), Max:98 9 °F (37 2 °C)    Temperature: 98 9 °F (37 2 °C)      Physical Exam     Neurologic Exam     Mental Status         Cranial Nerves     CN V   Facial sensation intact  CN VII   Facial expression full, symmetric  CN IX, X   CN IX normal    CN X normal      CN XI   CN XI normal      CN XII   CN XII normal    Light touch sensation at face symmetric in all distributions        Motor Exam     Strength   Right deltoid: 5/5  Left deltoid: 5/5  Right biceps: 5/5  Left biceps: 5/5  Right triceps: 5/5  Left triceps: 5/5  Right iliopsoas: 5/5  Left iliopsoas: 5/5  Right quadriceps: 5/5  Left quadriceps: 5/5  Right anterior tibial: 5/5  Left anterior tibial: 5/5  Right gastroc: 5/5  Left gastroc: 5/5    Sensory Exam   Light touch normal    Pinprick normal         LABORATORY DATA     Labs: I have personally reviewed pertinent reports  Results from last 7 days   Lab Units 04/20/22  0440 04/19/22  0616   WBC Thousand/uL 5 73 6 01   HEMOGLOBIN g/dL 10 1* 10 8*   HEMATOCRIT % 31 6* 33 4*   PLATELETS Thousands/uL 134* 154      Results from last 7 days   Lab Units 04/20/22  0440 04/19/22  0616   SODIUM mmol/L 138 141   POTASSIUM mmol/L 4 6 4 2   CHLORIDE mmol/L 102 100   CO2 mmol/L 24 29   BUN mg/dL 56* 40*   CREATININE mg/dL 7 94* 6 52*   CALCIUM mg/dL 8 6 8 5              Results from last 7 days   Lab Units 04/19/22  0616   INR  1 02   PTT seconds 30               IMAGING & DIAGNOSTIC TESTING     Radiology Results: I have personally reviewed pertinent reports  XR follow up   Final Result by Audrey Hughes MD (04/20 2924)      Sacral stimulator lead is intact  Workstation performed: [id number]         CTA stroke alert (head/neck)   Final Result by Yue Olivares MD (04/19 0700)      No acute intracranial abnormality  No large vessel occlusion         Moderate atherosclerotic disease with moderate stenosis of the cavernous right internal carotid artery and severe stenosis of the intracranial left vertebral artery  Focus of moderate stenosis at the proximal left M1 segment  Chronic truncation of the terminal left middle cerebral artery territory branches  Moderate stenosis of the proximal P1 segment of the left posterior cerebral artery  Mild to moderate atherosclerotic plaquing of the bilateral carotid bulbs causing no hemodynamically significant stenosis  I personally reported the findings to Sarah Sanon on 4/19/2022 at 6:44 AM             Workstation performed: [id number]         CT stroke alert brain   Final Result by Julieta Clayton MD (04/19 0645)      No acute intracranial abnormality  Stable chronic small vessel ischemic changes  I personally discussed this study with Sarah Sanon on 4/19/2022 at 6:44 AM                    Workstation performed: [id number]         MRI brain wo contrast    (Results Pending)       Other Diagnostic Testing: I have personally reviewed pertinent reports        ACTIVE MEDICATIONS     Current Facility-Administered Medications   Medication Dose Route Frequency    aluminum-magnesium hydroxide-simethicone (MYLANTA) oral suspension 30 mL  30 mL Oral Q4H PRN    aspirin chewable tablet 81 mg  81 mg Oral Daily    atorvastatin (LIPITOR) tablet 40 mg  40 mg Oral Daily With Dinner    calcium acetate (PHOSLO) capsule 1,334 mg  1,334 mg Oral TID With Meals    calcium carbonate (TUMS) chewable tablet 500 mg  500 mg Oral Daily PRN    carvedilol (COREG) tablet 6 25 mg  6 25 mg Oral BID With Meals    clopidogrel (PLAVIX) tablet 75 mg  75 mg Oral Daily    doxercalciferol (HECTOROL) injection 5 mcg  5 mcg Intravenous After Dialysis    heparin (porcine) subcutaneous injection 5,000 Units  5,000 Units Subcutaneous Q8H [address]    insulin glargine (LANTUS) subcutaneous injection 14 Units 0 14 mL  14 Units Subcutaneous Daily    insulin lispro (HumaLOG) 100 units/mL subcutaneous injection 1-6 Units  1-6 Units Subcutaneous 4x Daily (AC & HS)    insulin lispro (HumaLOG) 100 units/mL subcutaneous injection 4 Units  4 Units Subcutaneous TID With Meals    iron sucrose (VENOFER) 50 mg in sodium chloride 0 9 % 100 mL IVPB  50 mg Intravenous Weekly    torsemide (DEMADEX) tablet 10 mg  10 mg Oral Once per day on Mon Wed Fri    torsemide [facility]) tablet 50 mg  50 mg Oral Once per day on Sun Tue Thu Sat       Prior to Admission medications    Medication Sig Start Date End Date Taking? Authorizing Provider   aspirin (ECOTRIN LOW STRENGTH) 81 mg EC tablet Take 81 mg by mouth daily Resume on 8/14      Historical Provider, MD   atorvastatin (LIPITOR) 40 mg tablet Take 1 tablet (40 mg total) by mouth daily with dinner 12/13/21   Raj Burrell DO   BD Pen Needle Adina U/F 32G X 4 MM MISC USE TO INJECT INSULIN 4 TIMES DAILY 5/19/21   Lissette Brennan PA-C   Blood Glucose Monitoring Suppl (True Metrix Meter) w/Device KIT Use to test blood sugars 3 times daily 3/28/22   Lissette Brennan PA-C   Blood Pressure Monitoring (BLOOD PRESSURE CUFF) MISC Use to check blood pressure before taking blood pressure medication and 1 hour after and follow instructions provided in discharge instructions based on the readings  8/13/18   Bala Lala MD   calcium acetate (CALPHRON) 667 mg TAKE 2 TABLETS BY MOUTH THREE TIMES DAILY WITH MEALS 10/17/21   Historical Provider, MD   carvedilol (COREG) 6 25 mg tablet Take 1 tablet (6 25 mg total) by mouth 2 (two) times a day with meals Or as directed by your kidney doctor 2/3/22   Ronal Fuchs DO   Cholecalciferol (Vitamin D3) 1 25 MG (12981 UT) CAPS TAKE 1 CAPSULE BY MOUTH ONE TIME PER WEEK 10/6/21   Priscilla Rausch MD   Continuous Blood Gluc  (DEXCOM G6 ) LANCE Use as directed for continuous glucose monitoring 10/28/19   Lissette Brennan PA-C   Continuous Blood Gluc Sensor (DEXCOM G6 SENSOR) MISC Use as directed for continuous glucose monitoring   Change every 10 days 10/28/19   Lissette Brennan PA-C   Continuous Blood Gluc Transmit (DEXCOM G6 TRANSMITTER) MISC Use as directed for continuous glucose monitoring-Change every 3 months 10/28/19   Lissette Brennan PA-C   doxercalciferol (HECTOROL) 0 5 mcg capsule 4 mcg 11/10/21 11/9/22  Historical Provider, MD   glucose blood (True Metrix Blood Glucose Test) test strip Use 1 each 3 (three) times a day Use as instructed 4/12/22   Lisa Paige MD   Incontinence Supplies (MALE URINAL) MISC by Does not apply route daily 9/24/18   Raj Avilez DO   insulin lispro (HumaLOG KwikPen) 100 units/mL injection pen INJECT 4 UNITS 3 TIMES A DAY WITH MEALS PLUS SCALE (max daily dose 42 units) 4/7/22   Lisa Paige MD   Insulin Syringe-Needle U-100 (B-D INS SYRINGE 0 5CC/30GX1/2") 30G X 1/2" 0 5 ML MISC Inject under the skin 4 (four) times a day 6/5/20   Lisa Paige MD   Lancets Ultra Thin 30G MISC Use 3 times a day 3/28/22   Lissette Brennan PA-C   Lantus 100 UNIT/ML subcutaneous injection INJECT 14 UNITS UNDER THE SKIN DAILY 4/13/22   Lissette Brennan PA-C   meclizine (ANTIVERT) 25 mg tablet Take 1 tablet (25 mg total) by mouth every 8 (eight) hours as needed for dizziness or nausea 4/7/22   Zach Hernandez MD   Nutritional Supplements (VITAMIN D BOOSTER PO) Take 0 5 mcg by mouth   6/21/21 6/20/22  Historical Provider, MD [place] LANCETS [id number] [facility] by Does not apply route 3 (three) times a day 11/27/18   Raj Auguste DO   TORSEMIDE PO Take 50 mg by mouth Pt takes 50 mg daily on non- dialysis days: sat, sun, Tues, and thrusday   Pt takes 10 mg of torsemide daily on dialysis days m- w- f   10/25/21   Historical Provider, MD         VTE Pharmacologic Prophylaxis: Heparin  VTE Mechanical Prophylaxis: sequential compression device    ==  MD [facility] Res[facility], PGY-3

## 2022-04-20 NOTE — PLAN OF CARE
Problem: MOBILITY - ADULT  Goal: Maintain or return to baseline ADL function  Description: INTERVENTIONS:  -  Assess patient's ability to carry out ADLs; assess patient's baseline for ADL function and identify physical deficits which impact ability to perform ADLs (bathing, care of mouth/teeth, toileting, grooming, dressing, etc )  - Assess/evaluate cause of self-care deficits   - Assess range of motion  - Assess patient's mobility; develop plan if impaired  - Assess patient's need for assistive devices and provide as appropriate  - Encourage maximum independence but intervene and supervise when necessary  - Involve family in performance of ADLs  - Assess for home care needs following discharge   - Consider OT consult to assist with ADL evaluation and planning for discharge  - Provide patient education as appropriate  Outcome: Progressing  Goal: Maintains/Returns to pre admission functional level  Description: INTERVENTIONS:  - Perform BMAT or MOVE assessment daily    - Set and communicate daily mobility goal to care team and patient/family/caregiver  - Collaborate with rehabilitation services on mobility goals if consulted  - Perform Range of Motion  times a day  - Reposition patient every  hours  - Dangle patient  times a day  - Stand patient  times a day  - Ambulate patient  times a day  - Out of bed to chair  times a day   - Out of bed for meals  times a day  - Out of bed for toileting  - Record patient progress and toleration of activity level   Outcome: Progressing     Problem: Neurological Deficit  Goal: Neurological status is stable or improving  Description: Interventions:  - Monitor and assess patient's level of consciousness, motor function, sensory function, and level of assistance needed for ADLs  - Monitor and report changes from baseline  Collaborate with interdisciplinary team to initiate plan and implement interventions as ordered  - Provide and maintain a safe environment    - Consider seizure precautions  - Consider fall precautions  - Consider aspiration precautions  - Consider bleeding precautions  Outcome: Progressing     Problem: Activity Intolerance/Impaired Mobility  Goal: Mobility/activity is maintained at optimum level for patient  Description: Interventions:  - Assess and monitor patient  barriers to mobility and need for assistive/adaptive devices  - Assess patient's emotional response to limitations  - Collaborate with interdisciplinary team and initiate plans and interventions as ordered  - Encourage independent activity per ability   - Maintain proper body alignment  - Perform active/passive rom as tolerated/ordered  - Plan activities to conserve energy   - Turn patient as appropriate  Outcome: Progressing     Problem: Communication Impairment  Goal: Ability to express needs and understand communication  Description: Assess patient's communication skills and ability to understand information  Patient will demonstrate use of effective communication techniques, alternative methods of communication and understanding even if not able to speak  - Encourage communication and provide alternate methods of communication as needed  - Collaborate with case management/ for discharge needs  - Include patient/family/caregiver in decisions related to communication  Outcome: Progressing     Problem: Potential for Aspiration  Goal: Non-ventilated patient's risk of aspiration is minimized  Description: Assess and monitor vital signs, respiratory status, and labs (WBC)  Monitor for signs of aspiration (tachypnea, cough, rales, wheezing, cyanosis, fever)  - Assess and monitor patient's ability to swallow  - Place patient up in chair to eat if possible  - HOB up at 90 degrees to eat if unable to get patient up into chair   - Supervise patient during oral intake  - Instruct patient/ family to take small bites    - Instruct patient/ family to take small single sips when taking liquids  - Follow patient-specific strategies generated by speech pathologist   Outcome: Progressing  Goal: Ventilated patient's risk of aspiration is minimized  Description: Assess and monitor vital signs, respiratory status, airway cuff pressure, and labs (WBC)  Monitor for signs of aspiration (tachypnea, cough, rales, wheezing, cyanosis, fever)  - Elevate head of bed 30 degrees if patient has tube feeding   - Monitor tube feeding  Outcome: Progressing     Problem: Nutrition  Goal: Nutrition/Hydration status is improving  Description: Monitor and assess patient's nutrition/hydration status for malnutrition (ex- brittle hair, bruises, dry skin, pale skin and conjunctiva, muscle wasting, smooth red tongue, and disorientation)  Collaborate with interdisciplinary team and initiate plan and interventions as ordered  Monitor patient's weight and dietary intake as ordered or per policy  Utilize nutrition screening tool and intervene per policy  Determine patient's food preferences and provide high-protein, high-caloric foods as appropriate  - Assist patient with eating   - Allow adequate time for meals   - Encourage patient to take dietary supplement as ordered  - Collaborate with clinical nutritionist   - Include patient/family/caregiver in decisions related to nutrition    Outcome: Progressing     Problem: Prexisting or High Potential for Compromised Skin Integrity  Goal: Skin integrity is maintained or improved  Description: INTERVENTIONS:  - Identify patients at risk for skin breakdown  - Assess and monitor skin integrity  - Assess and monitor nutrition and hydration status  - Monitor labs   - Assess for incontinence   - Turn and reposition patient  - Assist with mobility/ambulation  - Relieve pressure over bony prominences  - Avoid friction and shearing  - Provide appropriate hygiene as needed including keeping skin clean and dry  - Evaluate need for skin moisturizer/barrier cream  - Collaborate with interdisciplinary team   - Patient/family teaching  - Consider wound care consult   Outcome: Progressing     Problem: METABOLIC, FLUID AND ELECTROLYTES - ADULT  Goal: Electrolytes maintained within normal limits  Description: INTERVENTIONS:  - Monitor labs and assess patient for signs and symptoms of electrolyte imbalances  - Administer electrolyte replacement as ordered  - Monitor response to electrolyte replacements, including repeat lab results as appropriate  - Instruct patient on fluid and nutrition as appropriate  Outcome: Progressing  Goal: Fluid balance maintained  Description: INTERVENTIONS:  - Monitor labs   - Monitor I/O and WT  - Instruct patient on fluid and nutrition as appropriate  - Assess for signs & symptoms of volume excess or deficit  Outcome: Progressing

## 2022-04-20 NOTE — PROGRESS NOTES
[facility]  Progress Note - Venice Nieves 1960, 58 y o  male MRN: [id number]  Unit/Bed#: S -01 Encounter: [id number]  Primary Care Provider: Cinthya Christensen DO   Date and time admitted to hospital: 4/19/2022  5:55 AM    * Right sided pain and paresthesias  Assessment & Plan  · Patient presented with transient right sided pain/weakness/paresthesia which occurred suddenly on the morning of April 19th    Of note, prior to that it appears at dialysis he had more fluid taken off and was having some nausea and vomiting  symptoms are now resolved  · EKG in the ED without Afib, CT head and neck shows stable chronic small-vessel ischemic changes and moderate atherosclerotic disease  · Neurology consulted, they recommended admitting along stroke pathway, as patient does have prior history of stroke  · MRI ordered but not yet done because of issue with stimulator--awaiting device rep  · Maintained on aspirin, Plavix, statin      ESRD on dialysis [facility])  Assessment & Plan  Lab Results   Component Value Date    EGFR 6 04/20/2022    EGFR 8 04/19/2022    EGFR 7 04/07/2022    CREATININE 7 94 (H) 04/20/2022    CREATININE 6 52 (H) 04/19/2022    CREATININE 6 80 (H) 04/07/2022   On hemodialysis on Monday Wednesday Friday  Nephrology consulted  Appears otherwise euvolemic    Type 2 diabetes mellitus with chronic kidney disease on chronic dialysis, with long-term current use of insulin [facility])  Assessment & Plan  Lab Results   Component Value Date    HGBA1C 6 0 (H) 04/19/2022     Recent Labs     04/19/22  1622 04/19/22  2108 04/20/22  0717 04/20/22  1037   POCGLU 169* 154* 159* 147*     Blood Sugar Average: Last 72 hrs:  (P) 159 4 A1c reflects excellent control and blood sugars are within optimal range  Will continue home insulin regimen, SSI, along with diabetic diet    VTE Pharmacologic Prophylaxis:   Pharmacologic: Heparin  Mechanical VTE Prophylaxis in Place: No    Patient Centered Rounds: I have performed bedside rounds with nursing staff today  Discussions with Specialists or Other Care Team Provider:     Education and Discussions with Family / Patient:  Spoke with patient's son at bedside    Time Spent for Care: 25 min  More than 50% of total time spent on counseling and coordination of care as described above  Current Length of Stay: 1 day(s)    Current Patient Status: Inpatient   Certification Statement: The patient will continue to require additional inpatient hospital stay due to awaiting MRI    Discharge Plan:  Discharge when workup complete, including MRI ? tomorrow    Code Status: Level 1 - Full Code    Subjective:   Patient reports symptoms on his right side are completely resolved at this time and he feels back to normal   He denies any neck pain or injury, headache, chest pain, shortness of breath or other new issues overnight  Patient was able to communicate effectively in [place]  Objective:     Vitals:   Temp (24hrs), Av 1 °F (36 7 °C), Min:97 8 °F (36 6 °C), Max:98 5 °F (36 9 °C)    Temp:  [97 8 °F (36 6 °C)-98 5 °F (36 9 °C)] 98 °F (36 7 °C)  HR:  [74-78] 78  Resp:  [16-18] 18  BP: (136-170)/(63-77) 153/68  SpO2:  [93 %-98 %] 98 %  Body mass index is 35 29 kg/m²  Input and Output Summary (last 24 hours): Intake/Output Summary (Last 24 hours) at 2022 1128  Last data filed at 2022 0533  Gross per 24 hour   Intake 0 ml   Output 479 ml   Net -479 ml       Physical Exam:     Physical Exam  Vitals reviewed  Constitutional:       General: He is not in acute distress  Appearance: He is obese  He is not ill-appearing, toxic-appearing or diaphoretic  Eyes:      General: No scleral icterus  Right eye: No discharge  Left eye: No discharge  Conjunctiva/sclera: Conjunctivae normal    Cardiovascular:      Rate and Rhythm: Normal rate and regular rhythm  Heart sounds: No murmur heard  Pulmonary:      Effort: No respiratory distress  Breath sounds: No stridor  No wheezing or rhonchi  Abdominal:      Palpations: Abdomen is soft  Tenderness: There is no abdominal tenderness  There is no guarding  Comments: obese   Musculoskeletal:         General: No deformity  Right lower leg: No edema  Left lower leg: No edema  Skin:     General: Skin is warm and dry  Coloration: Skin is not jaundiced or pale  Findings: No bruising, erythema, lesion or rash  Neurological:      General: No focal deficit present  Mental Status: He is alert  Mental status is at baseline  Comments: Awake alert interactive, he is appropriate and conversant, follows commands, no dysarthria, aphasia or facial asymmetry   Psychiatric:         Mood and Affect: Mood normal          Thought Content: Thought content normal            Additional Data:     Labs:    Results from last 7 days   Lab Units 04/20/22  0440   WBC Thousand/uL 5 73   HEMOGLOBIN g/dL 10 1*   HEMATOCRIT % 31 6*   PLATELETS Thousands/uL 134*     Results from last 7 days   Lab Units 04/20/22  0440   SODIUM mmol/L 138   POTASSIUM mmol/L 4 6   CHLORIDE mmol/L 102   CO2 mmol/L 24   BUN mg/dL 56*   CREATININE mg/dL 7 94*   ANION GAP mmol/L 12   CALCIUM mg/dL 8 6   GLUCOSE RANDOM mg/dL 135     Results from last 7 days   Lab Units 04/19/22  0616   INR  1 02     Results from last 7 days   Lab Units 04/20/22  1037 04/20/22  0717 04/19/22  2108 04/19/22  1622 04/19/22  0613   POC GLUCOSE mg/dl 147* 159* 154* 169* 168*     Results from last 7 days   Lab Units 04/19/22  1239   HEMOGLOBIN A1C % 6 0*           * I Have Reviewed All Lab Data Listed Above  * Additional Pertinent Lab Tests Reviewed:  Meka 66 Admission Reviewed    Imaging:    Imaging Reports Reviewed Today Include:   Imaging Personally Reviewed by Myself Includes:      Recent Cultures (last 7 days):           Last 24 Hours Medication List:   Current Facility-Administered Medications   Medication Dose Route Frequency Provider Last Rate    aluminum-magnesium hydroxide-simethicone  30 mL Oral Q4H PRN Celina Agarwal MD      aspirin  81 mg Oral Daily Celina Agarwal MD      atorvastatin  40 mg Oral Daily With Yamila Ventura MD      calcium acetate  1,334 mg Oral TID With Meals Celina Agarwal MD      calcium carbonate  500 mg Oral Daily PRN Celina Agarwal MD     Carina Cabrera carvedilol  6 25 mg Oral BID With Meals Celina Agarwal MD      clopidogrel  75 mg Oral Daily Celina Agarwal MD      doxercalciferol  5 mcg Intravenous After Dialysis [facility]      heparin (porcine)  5,000 Units Subcutaneous [facility] Celina Agarwal MD      insulin glargine  14 Units Subcutaneous Daily Celina Agarwal MD      insulin lispro  1-6 Units Subcutaneous 4x Daily (AC & HS) Celina Agarwal MD      insulin lispro  4 Units Subcutaneous TID With Meals Celina Agarwal MD      iron sucrose  50 mg Intravenous Weekly [facility]      torsemide  10 mg Oral Once per day on Mon Wed Fri Celina Agarwal MD      torsemide  50 mg Oral Once per day on Sun Tue Thu Sat Celina Agarwal MD          Today, Patient Was Seen By: Tonya Naranjo PA-C    ** Please Note: Dictation voice to text software may have been used in the creation of this document   **

## 2022-04-20 NOTE — PROGRESS NOTES
Nurse received call from MRI control room  Nurse informed MRI that daughter turned back stimulator off for patient to get MRI, per report  MRI informed the nurse that back stimulator needs to be turned off in their presence and they need to photograph the remote  Nurse passed off information to day shift in report

## 2022-04-20 NOTE — PHYSICAL THERAPY NOTE
Physical Therapy Cancellation Note       04/20/22 1124   PT Last Visit   PT Visit Date 04/20/22   Note Type   Note type Cancelled Session   Cancel Reasons Other   Additional Comments referral received for PT eval and tx  attempted to see pt for eval but pt is receiving dialysis at bedside  will follow and initiate PT as appropriate  notified Heriberto Thrasher of cancellation       Genesis Davies, PT

## 2022-04-20 NOTE — PLAN OF CARE
Net UF goal 2kg over 2 5 hours as jose miguel via L AVF  Next HD tx Fri, 4/22  See flowsheets for further assessment details  Post-Dialysis RN Treatment Note    Blood Pressure:  Pre 162/81 mm/Hg  Post 133/70 mmHg   EDW  105kg    Weight:  Pre 106 kg   Post 105 kg   Mode of weight measurement: Bed Scale   Volume Removed 1000 ml    Treatment duration 130 minutes    NS given  No    Treatment shortened?  Yes, off 20 min early d/t dizziness and pt request, d/w Nephro Marissa ROMERO    Medications given during Rx Hecterol 5mcg   Estimated Kt/V  0 62   Access type: AV fistula   Access Issues: No    Report called to primary nurse   Yes, MARCOS LUCIANO , RN

## 2022-04-20 NOTE — ASSESSMENT & PLAN NOTE
Lab Results   Component Value Date    HGBA1C 6 0 (H) 04/19/2022     Recent Labs     04/19/22  1622 04/19/22  2108 04/20/22  0717 04/20/22  1037   POCGLU 169* 154* 159* 147*     Blood Sugar Average: Last 72 hrs:  (P) 159 4 A1c reflects excellent control and blood sugars are within optimal range  Will continue home insulin regimen, SSI, along with diabetic diet

## 2022-04-20 NOTE — PROGRESS NOTES
NEPHROLOGY PROGRESS NOTE   Kellie Herrera 58 y o  male MRN: [id number]  Unit/Bed#: S -01 Encounter: [id number]  Reason for Consult:  Management of ESRD    ASSESSMENT AND PLAN:  59 yo with PMH of ESRD on HD MWF at [facility], HTN, anemia, hyperparathyroidism presents with right-sided pain  Nephrology is consulted for management of ESRD    PLAN     #ESRD on HD MWF:  · Dialysis unit/days:[facility]   · Access:  Left AV fistula  · On HD today  · Will continue HD as per schedule  · EDW 105kg  · Renal Diet  · Fluid restriction 1-1 5L/d  · Adjust medications to GFR<10  · Avoid opioids     #Volume status/hypertension:  · Volume:  Slightly hypervolemic  · Blood pressure:  Hypertensive /86mmhg, goal< 140/90  · Low-sodium diet  · Coreg 625 mg b i d  · The [id number]/50mg     #Secondary Hyperparasitoidism   · PTH and vitamin-D as an outpatient  · Low phosphorus diet  · PhosLo 1334 mg t i d  With meals    # Anemia of Kidney Disease  · Current hemoglobin:  10 1 mg/dL  · At goal  · Treatment:  · Venofer 50 mg weekly as an outpatient  · Transfuse for hemoglobin less than 7 0 per primary service      # right-sided pain  · Pending MRI  · Management as per primary team      SUBJECTIVE:  Patient seen and examined at bedside  No chest pain, shortness of breath, nausea, vomiting, abdominal pain or diarrhea       OBJECTIVE:  Current Weight: Weight - Scale: 108 kg (239 lb)  Vitals:    04/20/22 1300   BP: 155/86   Pulse: 89   Resp: 16   Temp:    SpO2:        Intake/Output Summary (Last 24 hours) at 4/20/2022 1324  Last data filed at 4/20/2022 1134  Gross per 24 hour   Intake 440 ml   Output 479 ml   Net -39 ml     Wt Readings from Last 3 Encounters:   04/20/22 108 kg (239 lb)   04/14/22 108 kg (239 lb)   04/14/22 108 kg (239 lb)     Temp Readings from Last 3 Encounters:   04/20/22 98 °F (36 7 °C) (Oral)   04/07/22 98 °F (36 7 °C)   04/07/22 98 3 °F (36 8 °C) (Oral)     BP Readings from Last 3 Encounters:   04/20/22 155/86   04/14/22 130/84   04/14/22 126/75     Pulse Readings from Last 3 Encounters:   04/20/22 89   04/14/22 79   04/14/22 89        General:  Obese, no acute distress at this time  Skin:  No acute rash  Eyes:  No scleral icterus and noninjected  ENT:  mucous membranes moist  Neck:  no carotid bruits  Chest:  Clear to auscultation percussion, good respiratory effort, no use of accessory respiratory muscles  CVS:  Regular rate and rhythm without a murmur rub , no jugular venous distention,  Abdomen:  soft and nontender   Extremities:  No clubbing, no cyanosis, no significant lower extremity edema  Neuro:  No gross focality  Psych:  Alert , cooperative  Vascular access:  Left AV fistula with good bruit and thrill       Medications:    Current Facility-Administered Medications:     aluminum-magnesium hydroxide-simethicone (MYLANTA) oral suspension 30 mL, 30 mL, Oral, Q4H PRN, Frannie Salmeron MD, 30 mL at 04/19/22 1608    aspirin chewable tablet 81 mg, 81 mg, Oral, Daily, Frannie Salmeron MD, 81 mg at 04/20/22 7044    atorvastatin (LIPITOR) tablet 40 mg, 40 mg, Oral, Daily With Quinton Marino MD, 40 mg at 04/19/22 1608    calcium acetate (PHOSLO) capsule 1,334 mg, 1,334 mg, Oral, TID With Meals, Frannie Salmeron MD, 1,334 mg at 04/20/22 1256    calcium carbonate (TUMS) chewable tablet 500 mg, 500 mg, Oral, Daily PRN, Frannie Salmeron MD, 500 mg at 04/19/22 1608    carvedilol (COREG) tablet 6 25 mg, 6 25 mg, Oral, BID With Meals, Frannie Salmeron MD, 6 25 mg at 04/20/22 0839    clopidogrel (PLAVIX) tablet 75 mg, 75 mg, Oral, Daily, Frannie Salmeron MD, 75 mg at 04/20/22 0841    doxercalciferol (HECTOROL) injection 5 mcg, 5 mcg, Intravenous, After Dialysis, [facility], 5 mcg at 04/20/22 1302    heparin (porcine) subcutaneous injection 5,000 Units, 5,000 Units, Subcutaneous, Q8H [address], Frannie Salmeron MD, 5,000 Units at 04/20/22 0512    insulin glargine (LANTUS) subcutaneous injection 14 Units 0 14 mL, 14 Units, Subcutaneous, Daily, Marciano Carter MD, 14 Units at 04/20/22 0838    insulin lispro (HumaLOG) 100 units/mL subcutaneous injection 1-6 Units, 1-6 Units, Subcutaneous, 4x Daily (AC & HS), 1 Units at 04/20/22 0833 **AND** Fingerstick Glucose (POCT), , , 4x Daily AC and at bedtime, Marciano Carter MD    insulin lispro (HumaLOG) 100 units/mL subcutaneous injection 4 Units, 4 Units, Subcutaneous, TID With Meals, Marciano Carter MD, 4 Units at 04/20/22 0834    iron sucrose (VENOFER) 50 mg in sodium chloride 0 9 % 100 mL IVPB, 50 mg, Intravenous, Weekly, [facility], PA-MIGUEL, 50 mg at 04/20/22 0841    torsemide (DEMADEX) tablet 10 mg, 10 mg, Oral, Once per day on Mon Wed Fri, Marciano Carter MD, 10 mg at 04/20/22 0840    torsemide (DEMADEX) tablet 50 mg, 50 mg, Oral, Once per day on Sun Tue Thu Sat, Marciano Carter MD, 50 mg at 04/19/22 1743    Laboratory Results:  Results from last 7 days   Lab Units 04/20/22  0440 04/19/22  0616   WBC Thousand/uL 5 73 6 01   HEMOGLOBIN g/dL 10 1* 10 8*   HEMATOCRIT % 31 6* 33 4*   PLATELETS Thousands/uL 134* 154   SODIUM mmol/L 138 141   POTASSIUM mmol/L 4 6 4 2   CHLORIDE mmol/L 102 100   CO2 mmol/L 24 29   BUN mg/dL 56* 40*   CREATININE mg/dL 7 94* 6 52*   CALCIUM mg/dL 8 6 8 5       CTA stroke alert (head/neck)   Final Result by Tita Darnell MD (04/19 0700)      No acute intracranial abnormality  No large vessel occlusion  Moderate atherosclerotic disease with moderate stenosis of the cavernous right internal carotid artery and severe stenosis of the intracranial left vertebral artery  Focus of moderate stenosis at the proximal left M1 segment  Chronic truncation of the terminal left middle cerebral artery territory branches  Moderate stenosis of the proximal P1 segment of the left posterior cerebral artery  Mild to moderate atherosclerotic plaquing of the bilateral carotid bulbs causing no hemodynamically significant stenosis              I personally reported the findings to Sarah Sanon on 4/19/2022 at 6:44 AM             Workstation performed: [id number]         CT stroke alert brain   Final Result by Christina Lehman MD (04/19 0645)      No acute intracranial abnormality  Stable chronic small vessel ischemic changes  I personally discussed this study with Sarah Sanon on 4/19/2022 at 6:44 AM                    Workstation performed: [id number]         MRI brain wo contrast    (Results Pending)   XR follow up    (Results Pending)       Portions of the record may have been created with voice recognition software  Occasional wrong word or "sound a like" substitutions may have occurred due to the inherent limitations of voice recognition software  Read the chart carefully and recognize, using context, where substitutions have occurred

## 2022-04-20 NOTE — ASSESSMENT & PLAN NOTE
· Patient presented with transient right sided pain/weakness/paresthesia which occurred suddenly on the morning of April 19th    Of note, prior to that it appears at dialysis he had more fluid taken off and was having some nausea and vomiting  symptoms are now resolved  · EKG in the ED without Afib, CT head and neck shows stable chronic small-vessel ischemic changes and moderate atherosclerotic disease  · Neurology consulted, they recommended admitting along stroke pathway, as patient does have prior history of stroke  · MRI ordered but not yet done because of issue with stimulator--awaiting device rep  · Maintained on aspirin, Plavix, statin

## 2022-04-21 ENCOUNTER — PREP FOR PROCEDURE (OUTPATIENT)
Dept: OTHER | Facility: HOSPITAL | Age: 62
End: 2022-04-21

## 2022-04-21 ENCOUNTER — OFFICE VISIT (OUTPATIENT)
Dept: URGENT CARE | Facility: CLINIC | Age: 62
End: 2022-04-21
Payer: MEDICARE

## 2022-04-21 VITALS
DIASTOLIC BLOOD PRESSURE: 78 MMHG | OXYGEN SATURATION: 99 % | HEIGHT: 69 IN | HEART RATE: 76 BPM | WEIGHT: 239 LBS | TEMPERATURE: 98.2 F | RESPIRATION RATE: 16 BRPM | BODY MASS INDEX: 35.4 KG/M2 | SYSTOLIC BLOOD PRESSURE: 151 MMHG

## 2022-04-21 VITALS
HEART RATE: 82 BPM | OXYGEN SATURATION: 96 % | HEIGHT: 69 IN | RESPIRATION RATE: 18 BRPM | TEMPERATURE: 98.4 F | BODY MASS INDEX: 34.61 KG/M2 | WEIGHT: 233.69 LBS | DIASTOLIC BLOOD PRESSURE: 71 MMHG | SYSTOLIC BLOOD PRESSURE: 132 MMHG

## 2022-04-21 DIAGNOSIS — S01.81XA SUPERFICIAL LACERATION OF FACE: Primary | ICD-10-CM

## 2022-04-21 DIAGNOSIS — R53.1 RIGHT SIDED WEAKNESS: Primary | ICD-10-CM

## 2022-04-21 LAB
GLUCOSE SERPL-MCNC: 140 MG/DL (ref 65–140)
GLUCOSE SERPL-MCNC: 194 MG/DL (ref 65–140)
MRSA NOSE QL CULT: NORMAL

## 2022-04-21 PROCEDURE — 99232 SBSQ HOSP IP/OBS MODERATE 35: CPT | Performed by: STUDENT IN AN ORGANIZED HEALTH CARE EDUCATION/TRAINING PROGRAM

## 2022-04-21 PROCEDURE — 97167 OT EVAL HIGH COMPLEX 60 MIN: CPT

## 2022-04-21 PROCEDURE — 99239 HOSP IP/OBS DSCHRG MGMT >30: CPT | Performed by: INTERNAL MEDICINE

## 2022-04-21 PROCEDURE — 12011 RPR F/E/E/N/L/M 2.5 CM/<: CPT | Performed by: NURSE PRACTITIONER

## 2022-04-21 PROCEDURE — G0463 HOSPITAL OUTPT CLINIC VISIT: HCPCS | Performed by: NURSE PRACTITIONER

## 2022-04-21 PROCEDURE — 82948 REAGENT STRIP/BLOOD GLUCOSE: CPT

## 2022-04-21 PROCEDURE — 97535 SELF CARE MNGMENT TRAINING: CPT

## 2022-04-21 PROCEDURE — 99213 OFFICE O/P EST LOW 20 MIN: CPT | Performed by: NURSE PRACTITIONER

## 2022-04-21 PROCEDURE — 97163 PT EVAL HIGH COMPLEX 45 MIN: CPT

## 2022-04-21 PROCEDURE — 99232 SBSQ HOSP IP/OBS MODERATE 35: CPT | Performed by: PSYCHIATRY & NEUROLOGY

## 2022-04-21 PROCEDURE — 97116 GAIT TRAINING THERAPY: CPT

## 2022-04-21 RX ORDER — MAGNESIUM HYDROXIDE/ALUMINUM HYDROXICE/SIMETHICONE 120; 1200; 1200 MG/30ML; MG/30ML; MG/30ML
30 SUSPENSION ORAL EVERY 4 HOURS PRN
Qty: 355 ML | Refills: 0 | Status: SHIPPED | OUTPATIENT
Start: 2022-04-21 | End: 2022-06-15

## 2022-04-21 RX ORDER — CALCIUM CARBONATE 200(500)MG
500 TABLET,CHEWABLE ORAL DAILY PRN
Refills: 0
Start: 2022-04-21 | End: 2022-06-11

## 2022-04-21 RX ADMIN — INSULIN LISPRO 4 UNITS: 100 INJECTION, SOLUTION INTRAVENOUS; SUBCUTANEOUS at 08:24

## 2022-04-21 RX ADMIN — CARVEDILOL 6.25 MG: 6.25 TABLET, FILM COATED ORAL at 08:21

## 2022-04-21 RX ADMIN — INSULIN GLARGINE 14 UNITS: 100 INJECTION, SOLUTION SUBCUTANEOUS at 08:22

## 2022-04-21 RX ADMIN — HEPARIN SODIUM 5000 UNITS: 5000 INJECTION INTRAVENOUS; SUBCUTANEOUS at 05:17

## 2022-04-21 RX ADMIN — CLOPIDOGREL BISULFATE 75 MG: 75 TABLET ORAL at 08:21

## 2022-04-21 RX ADMIN — ASPIRIN 81 MG CHEWABLE TABLET 81 MG: 81 TABLET CHEWABLE at 08:22

## 2022-04-21 RX ADMIN — TORSEMIDE 50 MG: 20 TABLET ORAL at 08:24

## 2022-04-21 RX ADMIN — CALCIUM ACETATE 1334 MG: 667 CAPSULE ORAL at 08:21

## 2022-04-21 NOTE — ASSESSMENT & PLAN NOTE
History of left-sided lacunar stroke in 2018  With questionable residual right sided weakness  On aspirin and Lipitor at home      Plan:  Continue

## 2022-04-21 NOTE — ASSESSMENT & PLAN NOTE
Lab Results   Component Value Date    HGBA1C 6 0 (H) 04/19/2022     Recent Labs     04/20/22  1515 04/20/22  2121 04/21/22  0714 04/21/22  1040   POCGLU 189* 109 140 194*     Blood Sugar Average: Last 72 hrs:  (P) 158 [id number] A1c reflects excellent control and blood sugars are within optimal range  Will continue home insulin regimen, SSI, along with diabetic diet

## 2022-04-21 NOTE — CASE MANAGEMENT
Case Management Progress Note    Patient name Azul Mcintosh  Location S /S -01 MRN [id number]  : 1960 Date 2022       LOS (days): 2  Geometric Mean LOS (GMLOS) (days): 4 40  Days to GMLOS:2 2        OBJECTIVE:        Current admission status: Inpatient  Preferred Pharmacy:   [facility]/pharmacy #[id number]- [place], [place] - [facility] [address]  Phone: [phone] Fax: [phone]    Primary Care Provider: Pedro Cook DO    Primary Insurance: MEDICARE  Secondary Insurance:     PROGRESS NOTE:    Rolanda Castellano approved through [employer] and delivered to bedside  Patient signed delivery ticket; son aware

## 2022-04-21 NOTE — DISCHARGE INSTR - AVS FIRST PAGE
Dear Kellie Herrera,     It was our pleasure to care for you here at [facility]  It is our hope that we were always able to exceed the expected standards for your care during your stay  You were hospitalized due to right-sided pain and paresthesias with concern for stroke, which MRI did not show new stroke  You were cared for on the [facility] 3rd floor by Zahraa Dutta MD under the service of Devorah Nguyen MD with the [facility] who covers for your primary care physician (PCP), Sabrina Tabares DO, while you were hospitalized  If you have any questions or concerns related to this hospitalization, you may contact us at [phone]  For follow up as well as any medication refills, we recommend that you follow up with your primary care physician  A registered nurse will reach out to you by phone within a few days after your discharge to answer any additional questions that you may have after going home  However, at this time we provide for you here, the most important instructions / recommendations at discharge:     Notable Medication Adjustments -   Continue home medications  Testing Required after Discharge -   None  Important follow up information -   Close follow-up with your neurologist in 2 weeks  I provided information about loop recorders - continue discussion at your next appointments  Continue with your hemodialysis appointments  I set up a procedure for your loop recorder  I placed a case request for outpatient surgery at [address]  Other Instructions -   Continue healthy lifestyle choices including healthy diet, exercise and weight loss  Please review this entire after visit summary as additional general instructions including medication list, appointments, activity, diet, any pertinent wound care, and other additional recommendations from your care team that may be provided for you        Sincerely,     Maximiliano Moncada Maritza Lynn MD

## 2022-04-21 NOTE — ASSESSMENT & PLAN NOTE
· Patient presented with transient right sided pain/weakness/paresthesia which occurred suddenly on the morning of April 19th  Of note, prior to that it appears at dialysis he had more fluid taken off and was having some nausea and vomiting  symptoms are now resolved  · EKG in the ED without Afib, CT head and neck shows stable chronic small-vessel ischemic changes and moderate atherosclerotic disease  · Neurology consulted, they recommended admitting along stroke pathway, as patient does have prior history of stroke  · MRI ordered but not yet done because of issue with stimulator--awaiting device rep  · Maintained on aspirin, Plavix, statin    Plan:  Patient has complete resolution of symptoms - endorses being 100% normal  MR brain without contrast: Limited exam due to significant patient motion artifact  No evidence of acute infarct  Sees Dr Андрей Reed outpatient (neurologist/stroke specialist)  Follow-up outpatient for loop recorder placement evaluation (due to new CHRONIC stroke seen on imaging)  Per neurology recommendations will discharge on home aspirin

## 2022-04-21 NOTE — PROGRESS NOTES
NEUROLOGY RESIDENCY PROGRESS NOTE     Name: Edi Epperson   Age & Sex: 58 y o  male   MRN: [id number]  Unit/Bed#: S -01   Encounter: [id number]    Edi Epperson will need follow up in in 2 weeks with neurovascular AP Vincenzo Vaca agrees, he will see him in scheduled appt in Sept)  He will not require outpatient neurological testing  Schedule follow up: This should be completed prior to removing patient from list or discharge     Pending for discharge: None     ASSESSMENT & PLAN     History of stroke  Assessment & Plan  Patient is a 79-year-old male with history of diabetes mellitus,CKD on dialysis MWF, hypertension, hyperlipidemia, and left lacunar ischemic stroke (2018) who presents with R sided paraesthesias, nausea and vomiting  Family reports extra fluid removal from dialysis session the day prior to presentation  Nausea and vomiting resolved after receiving morning insulin however paraesthesias persisted  Paraesthesias are described as involving the right bicep area, right abdomen and proximal lateral thigh which are not typical for a stroke distribution  Stroke alert called on arrival  NIHSS of 1-2 and LKW 9 pm day prior to going to bed  Initial BP on arrival was Blood Pressure: (!) 173/77  As a result of mild, nondisabling symptoms and being outside of the tpa window patient was determined to not be a candidate for tPA   BP over last 24 hours: Blood Pressure: 166/77  current BP: Blood Pressure: 166/77    HBA1C 6 0, LDL 25    For reference --> [employer] spinal cord stimulatormodel # [id number]  Imaging:   CTH: Negative for any acute abnormalities    CTA: negative for LVO  There was moderate stenosis of the cavernous right ICA, proximal L M1, proximal R P1, severe stenosis of the intracranial left vertebral artery, chronic truncation of the terminal left middle cerebral artery territory branches and mild to moderate atherosclerotic plaquing of the bilateral carotid bulbs      MRI: Limited exam due to significant patient motion artifact  No evidence of acute infarct   Echocardiogram: Abnormal echo with mild dilation of bilateral atria    Telemetry: Monitor for afib     Impression: No acute stroke however will need loop recorder given new L occipital stroke findings on recent CT imaging not present in     Plan:   Goal BP: Normaotension    DC plavix, continue ASA monotherapy   Outpatient loop recorder  SLIM team to place order    Continue Statin   Monitor on telemetry  [facility] [place], operated by [facility] management as per primary team appreciated   Dr Balderrama January notified of MRI findings, recommending follow up with AP in 2 weeks   Discussed plan with patient and son    No further inpatient neuro recommendations         SUBJECTIVE     Patient was seen and examined  No acute events overnight  MRI negative for acute stroke  Patient continues to be asymptomatic with no recurrence of nausea, vomiting or paraesthesias  Denies any weakness  Review of Systems   Constitutional: Negative for chills and fever  Musculoskeletal: Negative for gait problem  Neurological: Negative for speech difficulty, weakness and headaches  Psychiatric/Behavioral: Negative for confusion  The patient is not nervous/anxious  OBJECTIVE     Patient ID: Koby Chun is a 58 y o  male  Vitals:    22 1338 22 1500 22 1900 22 0700   BP: 133/70 150/69 143/77 132/71   BP Location: Right arm Right arm Right arm Right arm   Pulse: 81 78 76 82   Resp: 15  15 18   Temp:  98 9 °F (37 2 °C) 98 2 °F (36 8 °C) 98 4 °F (36 9 °C)   TempSrc:  Oral Oral Oral   SpO2:  97% 94% 96%   Weight:       Height:          Temperature:   Temp (24hrs), Av 5 °F (36 9 °C), Min:98 2 °F (36 8 °C), Max:98 9 °F (37 2 °C)    Temperature: 98 4 °F (36 9 °C)      Physical Exam     Neurologic Exam     Mental Status         Cranial Nerves     CN V   Facial sensation intact  CN VII   Facial expression full, symmetric       CN IX, X   CN IX normal    CN X normal      CN XI   CN XI normal      CN XII   CN XII normal      Motor Exam     Strength   Right deltoid: 5/5  Left deltoid: 5/5  Right biceps: 5/5  Left biceps: 5/5  Right triceps: 5/5  Left triceps: 5/5  Right iliopsoas: 5/5  Left iliopsoas: 5/5  Right quadriceps: 5/5  Left quadriceps: 5/5  Right anterior tibial: 5/5  Left anterior tibial: 5/5  Right gastroc: 5/5  Left gastroc: 5/5    Sensory Exam   Light touch normal    Pinprick normal         LABORATORY DATA     Labs: I have personally reviewed pertinent reports  Results from last 7 days   Lab Units 04/20/22  0440 04/19/22  0616   WBC Thousand/uL 5 73 6 01   HEMOGLOBIN g/dL 10 1* 10 8*   HEMATOCRIT % 31 6* 33 4*   PLATELETS Thousands/uL 134* 154      Results from last 7 days   Lab Units 04/20/22  0440 04/19/22  0616   SODIUM mmol/L 138 141   POTASSIUM mmol/L 4 6 4 2   CHLORIDE mmol/L 102 100   CO2 mmol/L 24 29   BUN mg/dL 56* 40*   CREATININE mg/dL 7 94* 6 52*   CALCIUM mg/dL 8 6 8 5              Results from last 7 days   Lab Units 04/19/22  0616   INR  1 02   PTT seconds 30               IMAGING & DIAGNOSTIC TESTING     Radiology Results: I have personally reviewed pertinent reports  MRI brain wo contrast   Final Result by Alpa Mccullough MD (04/20 1754)      Limited exam due to significant patient motion artifact  No evidence of acute infarct  Workstation performed: [id number]         XR follow up   Final Result by Tommy Cazaers MD (04/20 4074)      Sacral stimulator lead is intact  Workstation performed: [id number]         CTA stroke alert (head/neck)   Final Result by Julieta Clayton MD (04/19 0700)      No acute intracranial abnormality  No large vessel occlusion  Moderate atherosclerotic disease with moderate stenosis of the cavernous right internal carotid artery and severe stenosis of the intracranial left vertebral artery          Focus of moderate stenosis at the proximal left M1 segment  Chronic truncation of the terminal left middle cerebral artery territory branches  Moderate stenosis of the proximal P1 segment of the left posterior cerebral artery  Mild to moderate atherosclerotic plaquing of the bilateral carotid bulbs causing no hemodynamically significant stenosis  I personally reported the findings to Sarah Sanon on 4/19/2022 at 6:44 AM             Workstation performed: [id number]         CT stroke alert brain   Final Result by Meri Sarkar MD (04/19 0645)      No acute intracranial abnormality  Stable chronic small vessel ischemic changes  I personally discussed this study with Sarah Sanon on 4/19/2022 at 6:44 AM                    Workstation performed: [id number]             Other Diagnostic Testing: I have personally reviewed pertinent reports        ACTIVE MEDICATIONS     Current Facility-Administered Medications   Medication Dose Route Frequency    aluminum-magnesium hydroxide-simethicone (MYLANTA) oral suspension 30 mL  30 mL Oral Q4H PRN    aspirin chewable tablet 81 mg  81 mg Oral Daily    atorvastatin (LIPITOR) tablet 40 mg  40 mg Oral Daily With Dinner    calcium acetate (PHOSLO) capsule 1,334 mg  1,334 mg Oral TID With Meals    calcium carbonate (TUMS) chewable tablet 500 mg  500 mg Oral Daily PRN    carvedilol (COREG) tablet 6 25 mg  6 25 mg Oral BID With Meals    clopidogrel (PLAVIX) tablet 75 mg  75 mg Oral Daily    doxercalciferol (HECTOROL) injection 5 mcg  5 mcg Intravenous After Dialysis    heparin (porcine) subcutaneous injection 5,000 Units  5,000 Units Subcutaneous Q8H [address]    insulin glargine (LANTUS) subcutaneous injection 14 Units 0 14 mL  14 Units Subcutaneous Daily    insulin lispro (HumaLOG) 100 units/mL subcutaneous injection 1-6 Units  1-6 Units Subcutaneous 4x Daily (AC & HS)    insulin lispro (HumaLOG) 100 units/mL subcutaneous injection 4 Units  4 Units Subcutaneous TID With Meals  iron sucrose (VENOFER) 50 mg in sodium chloride 0 9 % 100 mL IVPB  50 mg Intravenous Weekly    torsemide (DEMADEX) tablet 10 mg  10 mg Oral Once per day on Mon Wed Fri    torsemide [facility]) tablet 50 mg  50 mg Oral Once per day on Sun Tue Thu Sat       Prior to Admission medications    Medication Sig Start Date End Date Taking? Authorizing Provider   aspirin (ECOTRIN LOW STRENGTH) 81 mg EC tablet Take 81 mg by mouth daily Resume on 8/14      Historical Provider, MD   atorvastatin (LIPITOR) 40 mg tablet Take 1 tablet (40 mg total) by mouth daily with dinner 12/13/21   Raj De La Cruz, DO   BD Pen Needle Adina U/F 32G X 4 MM MISC USE TO INJECT INSULIN 4 TIMES DAILY 5/19/21   Lissette Brennan PA-C   Blood Glucose Monitoring Suppl (True Metrix Meter) w/Device KIT Use to test blood sugars 3 times daily 3/28/22   Lissette Brennan PA-C   Blood Pressure Monitoring (BLOOD PRESSURE CUFF) MISC Use to check blood pressure before taking blood pressure medication and 1 hour after and follow instructions provided in discharge instructions based on the readings  8/13/18   Alan Phillip MD   calcium acetate (CALPHRON) 667 mg TAKE 2 TABLETS BY MOUTH THREE TIMES DAILY WITH MEALS 10/17/21   Historical Provider, MD   carvedilol (COREG) 6 25 mg tablet Take 1 tablet (6 25 mg total) by mouth 2 (two) times a day with meals Or as directed by your kidney doctor 2/3/22   Yasir Pleitez DO   Cholecalciferol (Vitamin D3) 1 25 MG (19728 UT) CAPS TAKE 1 CAPSULE BY MOUTH ONE TIME PER WEEK 10/6/21   Miguel Ramachandran MD   Continuous Blood Gluc  (DEXCOM G6 ) LANCE Use as directed for continuous glucose monitoring 10/28/19   Lissette Brennan PA-C   Continuous Blood Gluc Sensor (DEXCOM G6 SENSOR) MISC Use as directed for continuous glucose monitoring   Change every 10 days 10/28/19   Lissette Brennan PA-C   Continuous Blood Gluc Transmit (DEXCOM G6 TRANSMITTER) MISC Use as directed for continuous glucose monitoring-Change every 3 months 10/28/19   Lissette MACHO Brennan PA-C   doxercalciferol (HECTOROL) 0 5 mcg capsule 4 mcg 11/10/21 11/9/22  Historical Provider, MD   glucose blood (True Metrix Blood Glucose Test) test strip Use 1 each 3 (three) times a day Use as instructed 4/12/22   Laurence Haywood MD   Incontinence Supplies (MALE URINAL) MISC by Does not apply route daily 9/24/18   Raj More DO   insulin lispro (HumaLOG KwikPen) 100 units/mL injection pen INJECT 4 UNITS 3 TIMES A DAY WITH MEALS PLUS SCALE (max daily dose 42 units) 4/7/22   Laurence Haywood MD   Insulin Syringe-Needle U-100 (B-D INS SYRINGE 0 5CC/30GX1/2") 30G X 1/2" 0 5 ML MISC Inject under the skin 4 (four) times a day 6/5/20   Laurence Haywood MD   Lancets Ultra Thin 30G MISC Use 3 times a day 3/28/22   Lissette Brennan PA-C   Lantus 100 UNIT/ML subcutaneous injection INJECT 14 UNITS UNDER THE SKIN DAILY 4/13/22   Lissette Brennan PA-C   meclizine (ANTIVERT) 25 mg tablet Take 1 tablet (25 mg total) by mouth every 8 (eight) hours as needed for dizziness or nausea 4/7/22   Daryle Inches, MD   Nutritional Supplements (VITAMIN D BOOSTER PO) Take 0 5 mcg by mouth   6/21/21 6/20/22  Historical Provider, MD Joellen Gil LANCETS 95V [address] [facility] by Does not apply route 3 (three) times a day 11/27/18   Raj Auguste DO   TORSEMIDE PO Take 50 mg by mouth Pt takes 50 mg daily on non- dialysis days: sat, sun, Tues, and thrusday   Pt takes 10 mg of torsemide daily on dialysis days m- w- f   10/25/21   Historical Provider, MD         VTE Pharmacologic Prophylaxis: Heparin  VTE Mechanical Prophylaxis: sequential compression device    ==  MD [facility], PGY-3

## 2022-04-21 NOTE — PLAN OF CARE
Problem: PHYSICAL THERAPY ADULT  Goal: Performs mobility at highest level of function for planned discharge setting  See evaluation for individualized goals  Description: Treatment/Interventions: Functional transfer training,LE strengthening/ROM,Elevations,Therapeutic exercise,Endurance training,Cognitive reorientation,Patient/family training,Equipment eval/education,Bed mobility,Gait training          See flowsheet documentation for full assessment, interventions and recommendations  Note: Prognosis: Good  Problem List: Decreased strength,Decreased endurance,Impaired balance,Decreased mobility,Decreased coordination,Decreased safety awareness  Assessment: Pt presents with right-sided weakness  Dx: ESRD on HD, HTN, anemia, right sided pain and paresthesias, and DM  order placed for PT eval and tx, w/ activity order of up and out of bed as tolerated  pt presents w/ comorbidities of CVA, ESRD, CKD, hypercholesterolemia, HTN neuropathy, obesity, osteomyelitis, PVCs, right third toe amputation, and DM and personal factors of advanced age, living in 2 story house and inability to perform IADLs  pt presents w/ weakness, decreased endurance, impaired balance, gait deviations, impaired coordination, decreased safety awareness and fall risk  these impairments are evident in findings from physical examination (weakness and impaired coordination), mobility assessment (need for standby to min assist w/ all phases of mobility when usually mobilizing independently, tolerance to only 8 feet of ambulation and need for cueing for mobility technique), and Barthel Index: 65/100  pt needed input for task focus and mobility technique  pt is at risk for falls due to physical and safety awareness deficits   pt's clinical presentation is unstable/unpredictable (evident in need for assist w/ all phases of mobility when usually mobilizing independently, tolerance to only 8 feet of ambulation and need for input for task focus and mobility technique)  pt needs inpatient PT tx to improve mobility deficits and progress mobility training as appropriate  discharge recommendation is for home w/ family support and home PT to reduce fall risk and maximize level of functional independence  pt would benefit from Gerontology consult to address cognition and aging related issues  PT Discharge Recommendation: Home with home health rehabilitation          See flowsheet documentation for full assessment

## 2022-04-21 NOTE — PHYSICAL THERAPY NOTE
PHYSICAL THERAPY EVALUATION NOTE    Patient Name: Paras Gar  HQSIR'M Date: 4/21/2022  AGE:   58 y o  Mrn:   [id number]  ADMIT DX:  Numbness [R20 0]  Weakness [R53 1]  ESRD on dialysis ([facility] ) [N18 6, Z99 2]    Past Medical History:   Diagnosis Date    Cerebrovascular accident (CVA) due to thrombosis of left middle cerebral artery ([facility] ) 7/29/2018    Chronic kidney disease     Diabetes mellitus ([place] [address] )     GERD (gastroesophageal reflux disease)     Hypercholesteremia     Hyperlipidemia     Hypertension     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    Neuropathy     Obesity     Osteomyelitis ([place] [address] )     last assessed 11/4/16    PVC's (premature ventricular contractions)     sees cardiology Dr Semaj camargo    [facility]roke [facility])     last weeof July 2018 [address] [facility]    TIA (transient ischemic attack) 10/28/2018     Length Of Stay: 2  PHYSICAL THERAPY EVALUATION :    04/21/22 1049   PT Last Visit   PT Visit Date 04/21/22   Pain Assessment   Pain Assessment Tool 0-10   Pain Score No Pain   Restrictions/Precautions   Other Precautions Chair Alarm; Bed Alarm; Fall Risk;Telemetry;Limb alert  (L UE limb alert)   Home Living   Type of [address] [place] [address] Two level;Bed/bath upstairs; Able to live on main level with bedroom/bathroom; Other (Comment)  (3 steps between levels )   Additional Comments lives w/ spouse and family  ambulates w/o device  independent w/ ADLs  family drives  no falls in last 6 months  General   Additional Pertinent History room air resting pulse ox 94% and 71 BPM    Family/Caregiver Present Yes   Cognition   Arousal/Participation Alert   Orientation Level Oriented to person; Other (Comment)  (pt was identified w/ full name, birth date)   Following Commands Follows one step commands with increased time or repetition   Subjective   Subjective pt seen sitting on edge of bed w/ son present   pt agreed to PT eval  denied pain or dizziness  occasional input was needed for task focus  RUE Assessment   RUE Assessment WFL   LUE Assessment   LUE Assessment WFL   RLE Assessment   RLE Assessment WFL  (4- to 4/5)   LLE Assessment   LLE Assessment WFL  (3+ to 4-/5)   Coordination   Movements are Fluid and Coordinated 0   Coordination and Movement Description impaired coordination all extremities   Light Touch   RLE Light Touch Grossly intact   LLE Light Touch Grossly intact   Bed Mobility   Supine to Sit 5  Supervision   Additional items HOB elevated; Bedrails; Increased time required;Verbal cues  (for trunk/LE positioning)   Transfers   Sit to Stand 5  Supervision   Additional items Increased time required   Stand to Sit 5  Supervision   Additional items Impulsive;Verbal cues  (for body positioning)   Additional Comments pt completed advance/retreat 3x bilaterally w/ minx1  pt then ambulated as noted below   Ambulation/Elevation   Gait pattern Forward Flexion; Wide JOSE DAVID; Short stride   Gait Assistance 4  Minimal assist   Additional items Assist x 1;Verbal cues  (for full step length, safety)   Assistive Device None   Distance 8 feet  (additional not possible due to safety)   Stair Management Assistance 5  Supervision   Additional items Verbal cues; Increased time required  (for foot clearance)   Stair Management Technique Two rails; Nonreciprocal   Number of Stairs 3   Balance   Static Sitting Fair +   Dynamic Sitting Fair -   Static Standing Fair -   Ambulatory Poor +   Activity Tolerance   Activity Tolerance Patient limited by fatigue   Nurse Made Aware spoke to Dr Yaneth Lee PCA   Assessment   Prognosis Good   Problem List Decreased strength;Decreased endurance; Impaired balance;Decreased mobility; Decreased coordination;Decreased safety awareness   Assessment Pt presents with right-sided weakness   Dx: ESRD on HD, HTN, anemia, right sided pain and paresthesias, and DM  order placed for PT eval and tx, w/ activity order of up and out of bed as tolerated  pt presents w/ comorbidities of CVA, ESRD, CKD, hypercholesterolemia, HTN neuropathy, obesity, osteomyelitis, PVCs, right third toe amputation, and DM and personal factors of advanced age, living in 2 story house and inability to perform IADLs  pt presents w/ weakness, decreased endurance, impaired balance, gait deviations, impaired coordination, decreased safety awareness and fall risk  these impairments are evident in findings from physical examination (weakness and impaired coordination), mobility assessment (need for standby to min assist w/ all phases of mobility when usually mobilizing independently, tolerance to only 8 feet of ambulation and need for cueing for mobility technique), and Barthel Index: 65/100  pt needed input for task focus and mobility technique  pt is at risk for falls due to physical and safety awareness deficits  pt's clinical presentation is unstable/unpredictable (evident in need for assist w/ all phases of mobility when usually mobilizing independently, tolerance to only 8 feet of ambulation and need for input for task focus and mobility technique)  pt needs inpatient PT tx to improve mobility deficits and progress mobility training as appropriate  discharge recommendation is for home w/ family support and home PT to reduce fall risk and maximize level of functional independence  pt would benefit from Gerontology consult to address cognition and aging related issues  Goals   Patient Goals go home and relax   STG Expiration Date 05/01/22   Short Term Goal #1 pt will:  Increase bilateral LE strength 1/2 grade to facilitate independent mobility, Perform bed mobility independently to increase level of independence, Perform all transfers independently to improve independence, Ambulate 300 ft  with least restrictive assistive device independently w/o LOB to improve functional independence, Navigate 3 stair(s) independently with unilateral handrail to facilitate return to previous living environment, Increase ambulatory balance 1 grade to decrease risk for falls, Tolerate 3 hr OOB to faciliate upright tolerance, Improve gait speed to 0 66 m/s to reduce fall risk and increase independence and Improve Barthel Index score to 85 or greater to facilitate independence   PT Treatment Day 1   Plan   Treatment/Interventions Functional transfer training;LE strengthening/ROM; Elevations; Therapeutic exercise; Endurance training;Cognitive reorientation;Patient/family training;Equipment eval/education; Bed mobility;Gait training   PT Frequency 3-5x/wk   Recommendation   PT Discharge Recommendation Home with home health rehabilitation   Additional Comments pt would benefit from Gerontology consult to address cognition and aging related issues  AM-PAC Basic Mobility Inpatient   Turning in Bed Without Bedrails 4   Lying on Back to Sitting on Edge of Flat Bed 3   Moving Bed to Chair 3   Standing Up From Chair 3   Walk in Room 3   Climb 3-5 Stairs 3   Basic Mobility Inpatient Raw Score 19   Basic Mobility Standardized Score 42 48   Highest Level Of Mobility   JH-HLM Goal 6: Walk 10 steps or more   JH-HLM Highest Level of Mobility 7: Walk 25 feet or more   JH-HLM Goal Achieved Yes   Barthel Index   Feeding 10   Bathing 0   Grooming Score 5   Dressing Score 5   Bladder Score 5   Bowels Score 10   Toilet Use Score 5   Transfers (Bed/Chair) Score 10   Mobility (Level Surface) Score 10   Stairs Score 5   Barthel Index Score 65   Additional Treatment Session   Start Time 1049   End Time 1059   Treatment Assessment Therapist introduced roller walker use w/ mobility to address impairments noted during evaluation  sit <---> stand transfers w/ supervision  Ambulated 150, 80 feet w/ roller walker w/ supervision  standing rest break was needed  additional not possible due to fatigue  Comfortable Gait Speed: 0 55 m/s   Pt was noted to have improvement in mobility status w/ use of walker w/ decreased level of assistance and improved ambulation tolerance  Pt continues to require standby assist and verbal cues w/ mobility for proper technique/safety  Pt is at risk for falling  continued inpatient PT tx is indicated to reduce fall risk factors  Equipment Use roller walker   Additional Treatment Day 1   End of Consult   Patient Position at End of Consult Bedside chair;Bed/Chair alarm activated; All needs within reach     The patient's AM-PAC Basic Mobility Inpatient Short Form Raw Score is 19  A Raw score of greater than 16 suggests the patient may benefit from discharge to home  Please also refer to the recommendation of the Physical Therapist for safe discharge planning  Comfortable Gait Speed: 0 55 m/s w/ roller walker  Gait Speed Interpretation:  Gain of 0 1 m/s is a predictor of well-being in those w/ abnormal walking speed compared to age-patched peers  <0 7m/s is at an increased risk for falls    Household ambulator: <0 4 m/s  Limited community ambulator: 0 4-0 8 m/s  [employer] ambulator: 0 8-1 2 m/s  Able to safely cross streets: >1 2 m/s    Skilled PT recommended while in hospital and upon DC to progress pt toward treatment goals       Tamra Manual, PT

## 2022-04-21 NOTE — CASE MANAGEMENT
Case Management Discharge Planning Note    Patient name Patt Gottlieb  Location S /S -61 MRN [id number]  : 1960 Date 2022       Current Admission Date: 2022  Current Admission Diagnosis:Right sided pain and paresthesias   Patient Active Problem List    Diagnosis Date Noted    Right sided pain and paresthesias 2022    Elevated troponin 2022    Hypertension 2022    Penetrating foot wound, left, initial encounter 2022    Emotional lability 2022    Enteritis 2022    Leukocytosis 2022    Pancytopenia ([facility] ) 2020    History of 2019 novel coronavirus disease (COVID-19) 2020    ESRD on dialysis ([facility] ) 2020    Acute kidney injury superimposed on chronic kidney disease ([facility] ) 10/05/2020    Anemia 10/05/2020    S/P arteriovenous (AV) fistula creation 2020    Pre-kidney transplant, listed 2020    Urge incontinence 2019    Overactive bladder 2019    Anxiety associated with depression 2019    symptomatic hypoglycemia 10/28/2018    History of stroke 10/04/2018    Abnormal EEG 2018    Other constipation 2018    GERD (gastroesophageal reflux disease) 2018    Diabetic macular edema ([facility] ) 2018    Cognitive impairment 2018    Elevated alkaline phosphatase level 2018    Nephrotic range proteinuria 2018    Type 2 diabetes mellitus with chronic kidney disease on chronic dialysis, with long-term current use of insulin ([facility] ) 2016    Dizziness 2016    Mixed hyperlipidemia 2016    Diabetic polyneuropathy associated with type 2 diabetes mellitus ([facility] ) 2016      LOS (days): 2  Geometric Mean LOS (GMLOS) (days): 4 40  Days to GMLOS:2 2     OBJECTIVE:  Risk of Unplanned Readmission Score: 21         Current admission status: Inpatient   Preferred Pharmacy:   [facility]/pharmacy #[id number]- [place] - [address] [place] [address]  Phone: [phone] Fax: [phone]    Primary Care Provider: Cheri Stevenson DO    Primary Insurance: MEDICARE  Secondary Insurance:     DISCHARGE DETAILS:                                [address]         Is the patient interested in [facility] at discharge?: Yes  [address] requested[de-identified] [address] Name[de-identified] [address] Provider[de-identified] PCP  Home Health Services Needed[de-identified] Evaluate Functional Status and Safety,Gait/ADL Training,Strengthening/Theraputic Exercises to Improve Function  Homebound Criteria Met[de-identified] Requires the Assistance of Another Person for Safe Ambulation or to Leave the Home,Uses an Assist Device (i e  cane, walker, etc)  Supporting Clincal Findings[de-identified] Fatigues Easliy in Short Distances,Limited Endurance,Cognitive Deficit Requiring the Assistance of Others    DME Referral Provided  Referral made for DME?: Yes  DME referral completed for the following items[de-identified] Cleavon Gone  DME Supplier Name[de-identified] [employer]    Other Referral/Resources/Interventions Provided:  Interventions: HHC,DME  Referral Comments: PT eval recommending home care services  [place] referral sent and only accepting agency at this time is [facility] (4/23) SOC  PT also recommending rolling walker  Met with patient/son at bedside to discuss same  Patient and son in agreement with home care services through [facility]; information added to AVS for their records  Also in agreement for walker to be ordered - deny preference for provider, so [facility] via [place] [facility]  Awaiting approval for walker and then will deliver to bedside      Would you like to participate in our [address] service program?  : No - Declined    Treatment Team Recommendation: Home with [address]  Discharge Destination Plan[de-identified] Home with [address] ([facility])  Transport at Discharge : Family IMM Given (Date):: 04/21/22  IMM Given to[de-identified] Patient (patient signed; son also present - copy provided for their records   In agreement w/ discharge for today )

## 2022-04-21 NOTE — PATIENT INSTRUCTIONS
--Keep Surgicel, pressure dressing in place for at least 24-48 hours, then may carefully remove  --Go to ER for any ongoing/recurrent bleeding

## 2022-04-21 NOTE — DISCHARGE INSTRUCTIONS
Cardiac Loop Recorder Insertion   WHAT YOU NEED TO KNOW:   A cardiac loop recorder is a device used to diagnose heart rhythm problems, such as a fast or irregular heartbeat  It is implanted in your left chest or armpit, just under the skin  The device records a pattern of your heart's rhythm, called an EKG  Your device records automatic EKGs, depending on how your healthcare provider programs it  You may also receive a handheld controller  You press a button on the controller when you have symptoms, such as dizziness or lightheadedness  The device will record an EKG at that moment  The recording can help your healthcare provider see if your symptoms may be caused by heart rhythm problems  Your healthcare provider will remove the device after it has collected enough data  You may need the device for up to 3 years  The procedure to remove the device is similar to the procedure used to implant it  DISCHARGE INSTRUCTIONS:   Follow up with your cardiologist as directed: You will need to return in 1 to 2 weeks  Your cardiologist will check your incision  He may also program your device settings again  He will retrieve data from the device every 1 to 3 months with a monitor held over your skin  You may be able to transmit data from your device over the phone  You will do this by calling a number provided by your cardiologist  Ask for information about this process  Write down your questions so you remember to ask them during your visits  Wound care:  Carefully wash your incision with soap and water  Keep the area clean and dry until it heals  Return to activity:  Most people can return to normal activities soon after the procedure  Your cardiologist may want to know if your work involves electrical current or high-voltage equipment  Ask about other electrical items that could interfere with your cardiac loop recorder  Contact your cardiologist if:   · You have a fever or chills       · Your wound is red, swollen, or draining pus  · You have questions or concerns about your condition or care  Seek care immediately or call 911 if:   · You feel weak, dizzy, or faint  · You lose consciousness  © Copyright [employer] 2018 Information is for End User's use only and may not be sold, redistributed or otherwise used for commercial purposes  All illustrations and images included in CareNotes® are the copyrighted property of A D A M , Inc  or [facility] Chris Kelly   The above information is an  only  It is not intended as medical advice for individual conditions or treatments  Talk to your doctor, nurse or pharmacist before following any medical regimen to see if it is safe and effective for you

## 2022-04-21 NOTE — DISCHARGE SUMMARY
[facility]  Discharge- Ronelle Bread 1960, 58 y o  male MRN: [id number]  Unit/Bed#: S -01 Encounter: [id number]  Primary Care Provider: Vinicius Kim DO   Date and time admitted to hospital: 4/19/2022  5:55 AM    * Right sided pain and paresthesias  Assessment & Plan  · Patient presented with transient right sided pain/weakness/paresthesia which occurred suddenly on the morning of April 19th  Of note, prior to that it appears at dialysis he had more fluid taken off and was having some nausea and vomiting  symptoms are now resolved  · EKG in the ED without Afib, CT head and neck shows stable chronic small-vessel ischemic changes and moderate atherosclerotic disease  · Neurology consulted, they recommended admitting along stroke pathway, as patient does have prior history of stroke  · MRI ordered but not yet done because of issue with stimulator--awaiting device rep  · Maintained on aspirin, Plavix, statin    Plan:  Patient has complete resolution of symptoms - endorses being 100% normal  MR brain without contrast: Limited exam due to significant patient motion artifact  No evidence of acute infarct  Sees Dr Lee Me outpatient (neurologist/stroke specialist)  Follow-up outpatient for loop recorder placement evaluation (due to new CHRONIC stroke seen on imaging)  Per neurology recommendations will discharge on home aspirin         ESRD on dialysis [facility])  Assessment & Plan  Lab Results   Component Value Date    EGFR 6 04/20/2022    EGFR 8 04/19/2022    EGFR 7 04/07/2022    CREATININE 7 94 (H) 04/20/2022    CREATININE 6 52 (H) 04/19/2022    CREATININE 6 80 (H) 04/07/2022   On hemodialysis on Monday Wednesday Friday  Nephrology consulted  Appears otherwise euvolemic     History of stroke  Assessment & Plan  History of left-sided lacunar stroke in 2018  With questionable residual right sided weakness  On aspirin and Lipitor at home      Plan:  Continue    Type 2 diabetes mellitus with chronic kidney disease on chronic dialysis, with long-term current use of insulin [facility])  Assessment & Plan  Lab Results   Component Value Date    HGBA1C 6 0 (H) 04/19/2022     Recent Labs     04/20/22  1515 04/20/22  2121 04/21/22  0714 04/21/22  1040   POCGLU 189* 109 140 194*     Blood Sugar Average: Last 72 hrs:  (P) 158 [id number] A1c reflects excellent control and blood sugars are within optimal range  Will continue home insulin regimen, SSI, along with diabetic diet     Mixed hyperlipidemia  Assessment & Plan  Lipid panel WNL; low HDL  Continue Lipitor     Hypertriglyceridemia-resolved as of 4/19/2022  Assessment & Plan  Lipid panel WNL; low HDL  Continue Lipitor       Medical Problems             Resolved Problems  Date Reviewed: 4/20/2022          Resolved    Hypertriglyceridemia 4/19/2022     Resolved by  Peyman Albrecht MD              Discharging Resident: Areli Ribeiro MD  Discharging Attending: No att  providers found  PCP: Cheri Stevenson DO  Admission Date:   Admission Orders (From admission, onward)     Ordered        04/19/22 0745  Inpatient Admission  Once                      Discharge Date: 04/21/22    Consultations During Hospital Stay:  · Neurology  · Nephrology    Procedures Performed:   · Hemodialysis (maintenance)    Significant Findings / Test Results:   · 4/20/22 Brain MRI w/o contrast: Limited exam due to significant patient motion artifact  No evidence of acute infarct  · 4/20/22: XR follow up: Sacral stimulator lead is intact  · 4/19/22: CTA stroke alert brain: No acute intracranial abnormality  No large vessel occlusion      Moderate atherosclerotic disease with moderate stenosis of the cavernous right internal carotid artery and severe stenosis of the intracranial left vertebral artery       Focus of moderate stenosis at the proximal left M1 segment   Chronic truncation of the terminal left middle cerebral artery territory branches       Moderate stenosis of the proximal P1 segment of the left posterior cerebral artery  Mild to moderate atherosclerotic plaquing of the bilateral carotid bulbs causing no hemodynamically significant stenosis  Incidental Findings:   · None     Test Results Pending at Discharge (will require follow up): · None     Outpatient Tests Requested:  · Loop Recorder  · I ordered procedure to be done at [address]  · Provided number to the cath lab as well as the number for neurology    Complications:  None    Reason for Admission: Right-sided pain and paresthesias    Hospital Course:   Paras Gar is a 58 y o  male patient with past medical history of left thalamic stroke ESRD type 2 diabetes on insulin who originally presented to the hospital on 4/19/2022 due to right-sided weakness and paresthesias  Patient was worked up on the stroke pathway with negative acute infarct as seen on MRI and CT  Patient was discharged on home aspirin without including Plavix  Patient received dialysis during hospitalization  Patient was on telemetry for 48 hours without any acute events  At the time of discharge patient endorsed wanting to go home, resolution of all symptoms and feeling back to 100% normal   Discharge instructions and verbal instructions given to patient to closely follow-up with his neurologist and primary care physician as well as the need for a loop recorder  At the request of Neurology I personally set up the order for loop recorder  Patient was discharged home with home health services  Please see above list of diagnoses and related plan for additional information  Condition at Discharge: stable    Discharge Day Visit / Exam:   Subjective:  Stable overnight  Sitting in bed eating well  Tells me he wants to go home  Says he is back to 100% normal  Denies pain, SHOB, N/V/D  No focal deficits on neuro exam  He endorses 100% resolution of his symptoms  No more pain or paresthesias      Vitals: Blood Pressure: 132/71 (04/21/22 0700)  Pulse: 82 (04/21/22 0700)  Temperature: 98 4 °F (36 9 °C) (04/21/22 0700)  Temp Source: Oral (04/21/22 0700)  Respirations: 18 (04/21/22 0700)  Height: 5' 9" (175 3 cm) (04/20/22 0945)  Weight - Scale: 106 kg (233 lb 11 oz) (04/20/22 1134)  SpO2: 96 % (04/21/22 0700)  Exam:   Physical Exam  Vitals and nursing note reviewed  Constitutional:       Appearance: He is well-developed  HENT:      Head: Normocephalic and atraumatic  Right Ear: External ear normal       Left Ear: External ear normal       Nose: Nose normal       Mouth/Throat:      Mouth: Mucous membranes are moist       Pharynx: Oropharynx is clear  Eyes:      Conjunctiva/sclera: Conjunctivae normal    Cardiovascular:      Rate and Rhythm: Normal rate and regular rhythm  Pulses: Normal pulses  Heart sounds: Normal heart sounds  No murmur heard  Pulmonary:      Effort: Pulmonary effort is normal  No respiratory distress  Breath sounds: Normal breath sounds  Abdominal:      General: There is no distension  Palpations: Abdomen is soft  Tenderness: There is no abdominal tenderness  There is no guarding  Musculoskeletal:      Cervical back: Neck supple  Right lower leg: No edema  Left lower leg: No edema  Skin:     General: Skin is warm and dry  Capillary Refill: Capillary refill takes less than 2 seconds  Neurological:      General: No focal deficit present  Mental Status: He is alert and oriented to person, place, and time  Cranial Nerves: No cranial nerve deficit  Sensory: No sensory deficit  Motor: No weakness  Discussion with Family: Updated  (son and family members) at bedside  Discharge instructions/Information to patient and family:   See after visit summary for information provided to patient and family        Provisions for Follow-Up Care:  See after visit summary for information related to follow-up care and any pertinent home health orders  Disposition:   Other: Home with [facility]    Planned Readmission: No    Discharge Medications:  See after visit summary for reconciled discharge medications provided to patient and/or family        **Please Note: This note may have been constructed using a voice recognition system**

## 2022-04-21 NOTE — PROGRESS NOTES
NEPHROLOGY PROGRESS NOTE   Krsital Law 58 y o  male MRN: [id number]  Unit/Bed#: S -01 Encounter: [id number]  Reason for Consult:  Management of ESRD    ASSESSMENT AND PLAN:  57 yo with PMH of ESRD on HD MWF at [address], HTN, anemia, hyperparathyroidism presents with right-sided pain   Nephrology is consulted for management of ESRD     PLAN     #ESRD on HD MWF:  · Dialysis unit[facility]   · Access:  Left AV fistula  · On HD today  · Had dialysis yesterday, well tolerated  · EDW 105kg  · Renal Diet  · Fluid restriction 1-1 5L/d  · Adjust medications to GFR<10  · Avoid opioids   · Will continue dialysis at his outpatient dialysis unit on discharge     #Volume status/hypertension:  · Volume:   euvolemic  · Blood pressure:   normotensive /71mmhg, goal< 140/90  · Low-sodium diet  · Coreg 625 mg b i d  · The [id number]/50mg      #Secondary Hyperparasitoidism   · PTH and vitamin-D as an outpatient  · Low phosphorus diet  · PhosLo 1334 mg t i d  With meals     # Anemia of Kidney Disease  · Current hemoglobin:  10 1 mg/dL  · At goal  · Treatment:  · Venofer  50 mg weekly as an outpatient  · Transfuse for hemoglobin less than 7 0 per primary service       # right-sided pain  · Management as per primary team      SUBJECTIVE:  Patient seen and examined at bedside  Had dialysis yesterday, well tolerated  No chest pain, shortness of breath, nausea, vomiting, abdominal pain or diarrhea       OBJECTIVE:  Current Weight: Weight - Scale: 106 kg (233 lb 11 oz)  Vitals:    04/21/22 0700   BP: 132/71   Pulse: 82   Resp: 18   Temp: 98 4 °F (36 9 °C)   SpO2: 96%       Intake/Output Summary (Last 24 hours) at 4/21/2022 1317  Last data filed at 4/21/2022 1049  Gross per 24 hour   Intake 1260 ml   Output 1770 ml   Net -510 ml     Wt Readings from Last 3 Encounters:   04/20/22 106 kg (233 lb 11 oz)   04/14/22 108 kg (239 lb)   04/14/22 108 kg (239 lb)     Temp Readings from Last 3 Encounters:   04/21/22 98 4 °F (36 9 °C) (Oral)   04/07/22 98 °F (36 7 °C)   04/07/22 98 3 °F (36 8 °C) (Oral)     BP Readings from Last 3 Encounters:   04/21/22 132/71   04/14/22 130/84   04/14/22 126/75     Pulse Readings from Last 3 Encounters:   04/21/22 82   04/14/22 79   04/14/22 89        General:  Obese, no acute distress at this time  Skin:  No acute rash  Eyes:  No scleral icterus and noninjected  ENT:  mucous membranes moist  Neck:  no carotid bruits  Chest:  Clear to auscultation percussion, good respiratory effort, no use of accessory respiratory muscles  CVS:  Regular rate and rhythm without a murmur rub ,  Abdomen:  soft and nontender   Extremities:  No clubbing, no cyanosis, no significant lower extremity edema  Neuro:  No gross focality  Psych:  Alert , cooperative   Vascular access:  Left AV fistula with good bruit and thrill    Medications:    Current Facility-Administered Medications:     aluminum-magnesium hydroxide-simethicone (MYLANTA) oral suspension 30 mL, 30 mL, Oral, Q4H PRN, Samy Bryant MD, 30 mL at 04/19/22 1608    aspirin chewable tablet 81 mg, 81 mg, Oral, Daily, Samy Bryant MD, 81 mg at 04/21/22 8558    atorvastatin (LIPITOR) tablet 40 mg, 40 mg, Oral, Daily With Cathy Burton MD, 40 mg at 04/20/22 1548    calcium acetate (PHOSLO) capsule 1,334 mg, 1,334 mg, Oral, TID With Meals, Samy Bryant MD, 1,334 mg at 04/21/22 6343    calcium carbonate (TUMS) chewable tablet 500 mg, 500 mg, Oral, Daily PRN, Samy Bryant MD, 500 mg at 04/19/22 1608    carvedilol (COREG) tablet 6 25 mg, 6 25 mg, Oral, BID With Meals, Samy Bryant MD, 6 25 mg at 04/21/22 6995    clopidogrel (PLAVIX) tablet 75 mg, 75 mg, Oral, Daily, Samy Bryant MD, 75 mg at 04/21/22 5165    doxercalciferol (HECTOROL) injection 5 mcg, 5 mcg, Intravenous, After Dialysis, [facility], [place], 5 mcg at 04/20/22 1302    heparin (porcine) subcutaneous injection 5,000 Units, 5,000 Units, Subcutaneous, Q8H [facility], Samy Bryant MD, 5,000 Units at 04/21/22 0517    insulin glargine (LANTUS) subcutaneous injection 14 Units 0 14 mL, 14 Units, Subcutaneous, Daily, Roberto Jolly MD, 14 Units at 04/21/22 6735    insulin lispro (HumaLOG) 100 units/mL subcutaneous injection 1-6 Units, 1-6 Units, Subcutaneous, 4x Daily (AC & HS), 1 Units at 04/20/22 1837 **AND** Fingerstick Glucose (POCT), , , 4x Daily AC and at bedtime, Roberto Jolly MD    insulin lispro (HumaLOG) 100 units/mL subcutaneous injection 4 Units, 4 Units, Subcutaneous, TID With Meals, Roberto Jolly MD, 4 Units at 04/21/22 0824    iron sucrose (VENOFER) 50 mg in sodium chloride 0 9 % 100 mL IVPB, 50 mg, Intravenous, Weekly, [facility], 50 mg at 04/20/22 0841    torsemide (DEMADEX) tablet 10 mg, 10 mg, Oral, Once per day on Mon Wed Fri, Roberto Jolly MD, 10 mg at 04/20/22 0840    torsemide (DEMADEX) tablet 50 mg, 50 mg, Oral, Once per day on Sun Tue Thu Sat, Roberto Jolly MD, 50 mg at 04/21/22 5382    Laboratory Results:  Results from last 7 days   Lab Units 04/20/22  0440 04/19/22  0616   WBC Thousand/uL 5 73 6 01   HEMOGLOBIN g/dL 10 1* 10 8*   HEMATOCRIT % 31 6* 33 4*   PLATELETS Thousands/uL 134* 154   SODIUM mmol/L 138 141   POTASSIUM mmol/L 4 6 4 2   CHLORIDE mmol/L 102 100   CO2 mmol/L 24 29   BUN mg/dL 56* 40*   CREATININE mg/dL 7 94* 6 52*   CALCIUM mg/dL 8 6 8 5       MRI brain wo contrast   Final Result by Mj Bruce MD (04/20 1754)      Limited exam due to significant patient motion artifact  No evidence of acute infarct  Workstation performed: [id number]         XR follow up   Final Result by Jessica Urbano MD (04/20 0104)      Sacral stimulator lead is intact  Workstation performed: [id number]         CTA stroke alert (head/neck)   Final Result by Meri Sarkar MD (04/19 0700)      No acute intracranial abnormality  No large vessel occlusion         Moderate atherosclerotic disease with moderate stenosis of the cavernous right internal carotid artery and severe stenosis of the intracranial left vertebral artery  Focus of moderate stenosis at the proximal left M1 segment  Chronic truncation of the terminal left middle cerebral artery territory branches  Moderate stenosis of the proximal P1 segment of the left posterior cerebral artery  Mild to moderate atherosclerotic plaquing of the bilateral carotid bulbs causing no hemodynamically significant stenosis  I personally reported the findings to Sarah Sanon on 4/19/2022 at 6:44 AM             Workstation performed: [id number]         CT stroke alert brain   Final Result by Enrike Riley MD (04/19 0645)      No acute intracranial abnormality  Stable chronic small vessel ischemic changes  I personally discussed this study with Sarah Kulkarni Saulo Kearns on 4/19/2022 at 6:44 AM                    Workstation performed: [id number]             Portions of the record may have been created with voice recognition software  Occasional wrong word or "sound a like" substitutions may have occurred due to the inherent limitations of voice recognition software  Read the chart carefully and recognize, using context, where substitutions have occurred

## 2022-04-21 NOTE — PLAN OF CARE
Problem: OCCUPATIONAL THERAPY ADULT  Goal: Performs self-care activities at highest level of function for planned discharge setting  See evaluation for individualized goals  Description: Treatment Interventions: ADL retraining,Functional transfer training,UE strengthening/ROM,Endurance training,Cognitive reorientation,Patient/family training,Equipment evaluation/education,Compensatory technique education          See flowsheet documentation for full assessment, interventions and recommendations  Outcome: Progressing  Note: Limitation: Decreased ADL status,Decreased Safe judgement during ADL,Decreased endurance,Decreased self-care trans,Decreased high-level ADLs  Prognosis: Good  Assessment: Pt is a 58 y o  male seen for OT evaluation s/p admit to [facility] on 4/19/2022 w/ Right sided weakness  Comorbidities affecting pt's functional performance at time of assessment include: type 2 DM with CKD, HLD, history of stroke, ESRD on dialysis  Personal factors affecting pt at time of IE include:steps to enter environment, difficulty performing ADLS and difficulty performing IADLS   Prior to admission, pt was independent in ADLs and functional mobility with no AD, assisted with IADLs by family members  Upon evaluation: Pt requires supervision for functional mobility and min A for LB adls 2* the following deficits impacting occupational performance: decreased strength, decreased balance, decreased tolerance and decreased safety awareness  Pt to benefit from continued skilled OT tx while in the hospital to address deficits as defined above and maximize level of functional independence w ADL's and functional mobility  Occupational Performance areas to address include: bathing/shower, toilet hygiene, dressing, functional mobility and community mobility  From OT standpoint, recommendation at time of d/c would be home OT         OT Discharge Recommendation: Home with home health rehabilitation

## 2022-04-21 NOTE — CASE MANAGEMENT
Case Management Assessment & Discharge Planning Note    Patient name Salina Rebolledo  Location S /S -25 MRN [id number]  : 1960 Date 2022       Current Admission Date: 2022  Current Admission Diagnosis:Right sided pain and paresthesias   Patient Active Problem List    Diagnosis Date Noted    Right sided pain and paresthesias 2022    Elevated troponin 2022    Hypertension 2022    Penetrating foot wound, left, initial encounter 2022    Emotional lability 2022    Enteritis 2022    Leukocytosis 2022    Pancytopenia ([facility] ) 2020    History of 2019 novel coronavirus disease (COVID-19) 2020    ESRD on dialysis ([facility] ) 2020    [facility] kidney [facility] superimposed on [facility] ) 10/05/2020    Anemia 10/05/2020    S/P arteriovenous (AV) fistula creation 2020    Pre-kidney transplant, listed 2020    Urge incontinence 2019    Overactive bladder 2019    Anxiety associated with depression 2019    symptomatic hypoglycemia 10/28/2018    History of stroke 10/04/2018    Abnormal EEG 2018    Other constipation 2018    GERD (gastroesophageal reflux disease) 2018    Diabetic macular edema ([facility] ) 2018    Cognitive impairment 2018    Elevated alkaline phosphatase level 2018    Nephrotic range proteinuria 2018    Type 2 diabetes mellitus with chronic kidney disease on chronic dialysis, with long-term current use of insulin ([facility] ) 2016    Dizziness 2016    Mixed hyperlipidemia 2016    Diabetic polyneuropathy associated with type 2 diabetes mellitus ([facility] ) 2016      LOS (days): 2  Geometric Mean LOS (GMLOS) (days): 4 40  Days to GMLOS:2 2     OBJECTIVE:    Risk of Unplanned Readmission Score: 21         Current admission status: Inpatient       Preferred Pharmacy:   [facility]/pharmacy #[id number] [place] [id number]  Phone: [phone] Fax: [phone]    Primary Care Provider: Al Champion DO    Primary Insurance: MEDICARE  Secondary Insurance:     ASSESSMENT:  Marisa Denny Proxies    There are no active Health Care Proxies on file  Patient Information  Admitted from[de-identified] Home  Mental Status: Alert,Confused  During Assessment patient was accompanied by:  Son  Assessment information provided by[de-identified] Son,Patient  Primary Caregiver: Family  Support Systems: Spouse/significant other,Family members,Children  Living Arrangements: Lives w/ Spouse/significant other (and children)    Activities of Daily Living Prior to Admission  Functional Status: Independent  Completes ADLs independently?: Yes  Ambulates independently?: Yes  Does patient use assisted devices?: No  Does patient currently own DME?: No  Does patient have a history of Outpatient Therapy (PT/OT)?: No  Does the patient have a history of Short-Term Rehab?: No  Does patient have a history of HHC?: No  Does patient currently have [facility]?: No         Patient Information Continued  Does patient have prescription coverage?: Yes  Does patient receive dialysis treatments?: Yes ([facility] - MWF at 11:00am)         Means of Transportation  Means of Transport to Appts[de-identified] Family transport  In the past 12 months, has lack of transportation kept you from medical appointments or from getting medications?: No  In the past 12 months, has lack of transportation kept you from meetings, work, or from getting things needed for daily living?: No  Was application for public transport provided?: N/A        DISCHARGE DETAILS:                                    DME Referral Provided  Referral made for DME?: Yes  DME referral completed for the following items[de-identified] Leydi Swanson  DME Supplier Name[de-identified] [employer]    Other Referral/Resources/Interventions Provided:  Interventions: HHC,DME  Referral Comments: Patient admitted due to right-sided pain and paresthesias  Patient is a dialysis patient - [facility] MWF 11:00am  Met with patient and son at bedside  Patient alert, oriented, but son reports some confusion  States that he lives with his spouse and children at home  Family provides all transport to dialysis  No history of home care services, rehab, or out-patient therapy  Patients family does report interest in home care services if eligible  PT eval pending; will follow-up after  Patient has no DME; ambulates with no devices at baseline  Anticipate d/c for today, so IMM reviewed  Patient signed, but son present - copy provided for both, and both in agreement for d/c for today  Will continue to follow for possible home care services  Would you like to participate in our [address] service program?  : No - Declined    Treatment Team Recommendation: Home with [facility]  Discharge Destination Plan[de-identified] Home with Giovanny at Discharge : Family                             IMM Given (Date):: 04/21/22  IMM Given to[de-identified] Patient (patient signed; son also present - copy provided for their records   In agreement w/ discharge for today )

## 2022-04-21 NOTE — OCCUPATIONAL THERAPY NOTE
Occupational Therapy Evaluation     Patient Name: Nisa Jiménez  SVNST'X Date: 4/21/2022  Problem List  Principal Problem:    Right sided pain and paresthesias  Active Problems:    Type 2 diabetes mellitus with chronic kidney disease on chronic dialysis, with long-term current use of insulin (HCC)    Mixed hyperlipidemia    History of stroke    ESRD on dialysis [facility])    Past Medical History  Past Medical History:   Diagnosis Date    Cerebrovascular accident (CVA) due to thrombosis of left middle cerebral artery ([facility] ) 7/29/2018    Chronic kidney disease     Diabetes mellitus [facility] )     GERD (gastroesophageal reflux disease)     Hypercholesteremia     Hyperlipidemia     Hypertension     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    Neuropathy     Obesity     Osteomyelitis ([facility] )     last assessed 11/4/16    PVC's (premature ventricular contractions)     sees cardiology Dr Chevy camargo    Stroke [facility])     last weeof July 2018 [address]    TIA (transient ischemic attack) 10/28/2018     Past Surgical History  Past Surgical History:   Procedure Laterality Date    ABDOMINAL SURGERY      CHOLECYSTECTOMY      Percutaneous    COLONOSCOPY      CYSTOSCOPY      OTHER SURGICAL HISTORY      "stimulator to control bowel movements"    MN ESOPHAGOGASTRODUODENOSCOPY TRANSORAL DIAGNOSTIC N/A 9/27/2016    Procedure: ESOPHAGOGASTRODUODENOSCOPY (EGD); Surgeon: Sneha Choi MD;  [facility]: [facility];   Service: Gastroenterology    MN LAP,CHOLECYSTECTOMY N/A 2/29/2016    Procedure: LAPAROSCOPIC CHOLECYSTECTOMY ;  Surgeon: Sirena Talley DO;  [facility];  Service: General    ROTATOR CUFF REPAIR Right     TOE AMPUTATION Right 10/28/2016    Procedure: 3RD TOE AMPUTATION ;  Surgeon: Fernando Sy DPM;  [facility];  Service:            04/21/22 1044   OT Last Visit   OT Visit Date 04/21/22   Note Type   Note type Evaluation   Pain Assessment   Pain Assessment Tool 0-10 Pain Score No Pain   Home Living   Type of Home House   Home Layout Multi-level   Bathroom Shower/Tub Tub/shower unit  (And shower stall on ground floor)   Bathroom Toilet Standard   Bathroom Equipment Grab bars in shower   Additional Comments Pt does not own any equipment  Lives in a split level home, 3 steps up and 3 steps down upon entry  Showers upstairs in the tub shower  Prior Function   Level of Rains Independent with ADLs and functional mobility; Needs assistance with IADLs   Lives With Spouse;Daughter  (2 Daughters, son lives next door)   [address] Help From Family   ADL Assistance Independent   IADLs Needs assistance   Falls in the last 6 months 0   Vocational Retired   Comments Retired , no falls  Lives with wife and 2 daughters who provide IADL assistance  Does not use a RW at baseline but feels he would benefit from one at this time  Lifestyle   Intrinsic Gratification I just want to go home and relax  (Likes watching TV)    Subjective   Subjective "Is that for me to take home?" Pt gestures to walker in room  ADL   Where Assessed Edge of bed   Eating Assistance 7  Independent   Grooming Assistance 7  Independent   LB Dressing Assistance 3  Moderate Assistance   Toileting Assistance  4  Minimal Assistance   Additional Comments Pt initially is able to doff and don a sock while seated EOB, but after urinating on his socks in bathroom, is unable to don new ones without assistance  During toileting, requires min A to steady, verbal cues for safety, and assistance for brief management  Bed Mobility   Rolling R 5  Supervision   Rolling L 5  Supervision   Supine to Sit 5  Supervision   Transfers   Sit to Stand 5  Supervision   Stand to Sit 5  Supervision   Stand pivot 5  Supervision   Additional Comments Pt initially requires min A for more challenging tasks such as taking steps backwards and turning, however graduates to supervision only with RW      Functional Mobility Functional Mobility 5  Supervision   Additional items Rolling walker   Balance   Static Sitting Fair   Dynamic Sitting Fair -   Static Standing Fair -   Dynamic Standing Fair -   Ambulatory Fair -   Activity Tolerance   Activity Tolerance Patient tolerated treatment well   Medical Staff [address] PT   Nurse Made Aware Emily Gillespie to see per RN   RUE Assessment   RUE Assessment WNL   LUE Assessment   LUE Assessment WNL   Hand Function   Gross Motor Coordination Functional   Fine Motor Coordination Functional   Sensation   Light Touch No apparent deficits   Vision-Basic Assessment   Current Vision Wears glasses all the time   Vision - Complex Assessment   Acuity Able to read clock/calendar on wall without difficulty; Able to read employee name badge without difficulty   Perception   Inattention/Neglect Appears intact   Motor Planning Appears intact   Cognition   Overall Cognitive Status [facility]   Arousal/Participation Alert; Cooperative   Attention Within functional limits   Orientation Level Oriented X4   Memory Within functional limits   Following Commands Follows one step commands with increased time or repetition   Comments Requires some repetition  A little impulsive during toileting  Assessment   Limitation Decreased ADL status; Decreased Safe judgement during ADL;Decreased endurance;Decreased self-care trans;Decreased high-level ADLs   Prognosis Good   Assessment Pt is a 58 y o  male seen for OT evaluation s/p admit to [facility] on 4/19/2022 w/ Right sided weakness  Comorbidities affecting pt's functional performance at time of assessment include: type 2 DM with CKD, HLD, history of stroke, ESRD on dialysis  Personal factors affecting pt at time of IE include:steps to enter environment, difficulty performing ADLS and difficulty performing IADLS   Prior to admission, pt was independent in ADLs and functional mobility with no AD, assisted with IADLs by family members   Upon evaluation: Pt requires supervision for functional mobility and min A for LB adls 2* the following deficits impacting occupational performance: decreased strength, decreased balance, decreased tolerance and decreased safety awareness  Pt to benefit from continued skilled OT tx while in the hospital to address deficits as defined above and maximize level of functional independence w ADL's and functional mobility  Occupational Performance areas to address include: bathing/shower, toilet hygiene, dressing, functional mobility and community mobility  From OT standpoint, recommendation at time of d/c would be home OT  Goals   Patient Goals "To relax"    Plan   Treatment Interventions ADL retraining;Functional transfer training;UE strengthening/ROM; Endurance training;Cognitive reorientation;Patient/family training;Equipment evaluation/education; Compensatory technique education   Goal Expiration Date 05/01/22   OT Treatment Day 1   OT Frequency 3-5x/wk   Recommendation   OT Discharge Recommendation Home with home health rehabilitation   AM-PAC Daily Activity Inpatient   Lower Body Dressing 3   Bathing 3   Toileting 3   Upper Body Dressing 4   Grooming 4   Eating 4   Daily Activity Raw Score 21   Daily Activity Standardized Score (Calc for Raw Score >=11) 44 27     GOALS    1) Pt will improve activity tolerance to G for min 30 min txment sessions    2) Pt will complete UB/LB dressing/self care w/ mod I using adaptive device and DME as needed    3) Pt will complete bathing w/ Mod I w/ use of AE and DME as needed    4) Pt will complete toileting w/ mod I w/ G hygiene/thoroughness using DME as needed    5) Pt will improve functional transfers to Mod I on/off all surfaces using DME as needed w/ G balance/safety     6) Pt will improve functional mobility during ADL/IADL/leisure tasks to Mod I using DME as needed w/ G balance/safety     7) Pt will participate in simulated IADL management task to increase independence to Mod I w/ G safety and endurance    8) Pt will demonstrate G attention for 10 minutes during ongoing cognitive assessment to assist w/ safe d/c planning/recommendations    9) Pt will demonstrate G carryover of pt/caregiver education and training as appropriate w/ mod I w/o cues w/ good tolerance    10) Pt will demonstrate 100% carryover of energy conservation techniques w/ mod I t/o functional I/ADL/leisure tasks w/o cues s/p skilled education    Dipika Bermudez, MOT, OTR/L

## 2022-04-22 LAB
DME PARACHUTE DELIVERY DATE ACTUAL: NORMAL
DME PARACHUTE DELIVERY DATE REQUESTED: NORMAL
DME PARACHUTE ITEM DESCRIPTION: NORMAL
DME PARACHUTE ORDER STATUS: NORMAL
DME PARACHUTE SUPPLIER NAME: NORMAL
DME PARACHUTE SUPPLIER PHONE: NORMAL

## 2022-04-25 ENCOUNTER — TRANSITIONAL CARE MANAGEMENT (OUTPATIENT)
Dept: INTERNAL MEDICINE CLINIC | Facility: CLINIC | Age: 62
End: 2022-04-25

## 2022-04-28 ENCOUNTER — TELEPHONE (OUTPATIENT)
Dept: CARDIOLOGY CLINIC | Facility: CLINIC | Age: 62
End: 2022-04-28

## 2022-04-28 NOTE — TELEPHONE ENCOUNTER
LVM to schedule his loop implant  Wanted to see if he wants to go to [facility] to get it done

## 2022-04-29 ENCOUNTER — APPOINTMENT (INPATIENT)
Dept: DIALYSIS | Facility: HOSPITAL | Age: 62
DRG: 304 | End: 2022-04-29
Payer: MEDICARE

## 2022-04-29 ENCOUNTER — APPOINTMENT (EMERGENCY)
Dept: CT IMAGING | Facility: HOSPITAL | Age: 62
DRG: 304 | End: 2022-04-29
Payer: MEDICARE

## 2022-04-29 ENCOUNTER — HOSPITAL ENCOUNTER (INPATIENT)
Facility: HOSPITAL | Age: 62
LOS: 1 days | Discharge: HOME | DRG: 304 | End: 2022-04-30
Attending: EMERGENCY MEDICINE | Admitting: INTERNAL MEDICINE
Payer: MEDICARE

## 2022-04-29 ENCOUNTER — TELEPHONE (OUTPATIENT)
Dept: NEUROLOGY | Facility: CLINIC | Age: 62
End: 2022-04-29

## 2022-04-29 ENCOUNTER — APPOINTMENT (EMERGENCY)
Dept: RADIOLOGY | Facility: HOSPITAL | Age: 62
DRG: 304 | End: 2022-04-29
Payer: MEDICARE

## 2022-04-29 DIAGNOSIS — I63.9 STROKE (HCC): ICD-10-CM

## 2022-04-29 DIAGNOSIS — I10 ESSENTIAL HYPERTENSION: Chronic | ICD-10-CM

## 2022-04-29 DIAGNOSIS — R42 DIZZINESS: Primary | ICD-10-CM

## 2022-04-29 DIAGNOSIS — I16.9 HYPERTENSIVE CRISIS: ICD-10-CM

## 2022-04-29 DIAGNOSIS — R51.9 HEADACHE: ICD-10-CM

## 2022-04-29 DIAGNOSIS — R77.8 ELEVATED TROPONIN: ICD-10-CM

## 2022-04-29 PROBLEM — R07.9 CHEST PAIN: Status: ACTIVE | Noted: 2022-04-29

## 2022-04-29 LAB
2HR DELTA HS TROPONIN: -9 NG/L
4HR DELTA HS TROPONIN: -2 NG/L
ANION GAP SERPL CALCULATED.3IONS-SCNC: 13 MMOL/L (ref 4–13)
APTT PPP: 32 SECONDS (ref 23–37)
BUN SERPL-MCNC: 62 MG/DL (ref 5–25)
CALCIUM SERPL-MCNC: 9.1 MG/DL (ref 8.3–10.1)
CARDIAC TROPONIN I PNL SERPL HS: 61 NG/L
CARDIAC TROPONIN I PNL SERPL HS: 68 NG/L
CARDIAC TROPONIN I PNL SERPL HS: 70 NG/L
CHLORIDE SERPL-SCNC: 103 MMOL/L (ref 100–108)
CO2 SERPL-SCNC: 25 MMOL/L (ref 21–32)
CREAT SERPL-MCNC: 8.06 MG/DL (ref 0.6–1.3)
ERYTHROCYTE [DISTWIDTH] IN BLOOD BY AUTOMATED COUNT: 14.3 % (ref 11.6–15.1)
FLUAV RNA RESP QL NAA+PROBE: NEGATIVE
FLUBV RNA RESP QL NAA+PROBE: NEGATIVE
GFR SERPL CREATININE-BSD FRML MDRD: 6 ML/MIN/1.73SQ M
GLUCOSE SERPL-MCNC: 130 MG/DL (ref 65–140)
GLUCOSE SERPL-MCNC: 144 MG/DL (ref 65–140)
HCT VFR BLD AUTO: 35.7 % (ref 36.5–49.3)
HGB BLD-MCNC: 11.3 G/DL (ref 12–17)
INR PPP: 1.01 (ref 0.84–1.19)
MCH RBC QN AUTO: 31 PG (ref 26.8–34.3)
MCHC RBC AUTO-ENTMCNC: 31.7 G/DL (ref 31.4–37.4)
MCV RBC AUTO: 98 FL (ref 82–98)
PLATELET # BLD AUTO: 162 THOUSANDS/UL (ref 149–390)
PMV BLD AUTO: 10.6 FL (ref 8.9–12.7)
POTASSIUM SERPL-SCNC: 4.9 MMOL/L (ref 3.5–5.3)
PROTHROMBIN TIME: 13.3 SECONDS (ref 11.6–14.5)
RBC # BLD AUTO: 3.64 MILLION/UL (ref 3.88–5.62)
RSV RNA RESP QL NAA+PROBE: NEGATIVE
SARS-COV-2 RNA RESP QL NAA+PROBE: NEGATIVE
SODIUM SERPL-SCNC: 141 MMOL/L (ref 136–145)
WBC # BLD AUTO: 6.19 THOUSAND/UL (ref 4.31–10.16)

## 2022-04-29 PROCEDURE — 84484 ASSAY OF TROPONIN QUANT: CPT | Performed by: EMERGENCY MEDICINE

## 2022-04-29 PROCEDURE — 82948 REAGENT STRIP/BLOOD GLUCOSE: CPT

## 2022-04-29 PROCEDURE — 80048 BASIC METABOLIC PNL TOTAL CA: CPT | Performed by: EMERGENCY MEDICINE

## 2022-04-29 PROCEDURE — 93005 ELECTROCARDIOGRAM TRACING: CPT

## 2022-04-29 PROCEDURE — 99284 EMERGENCY DEPT VISIT MOD MDM: CPT | Performed by: PSYCHIATRY & NEUROLOGY

## 2022-04-29 PROCEDURE — 85610 PROTHROMBIN TIME: CPT | Performed by: EMERGENCY MEDICINE

## 2022-04-29 PROCEDURE — G1004 CDSM NDSC: HCPCS

## 2022-04-29 PROCEDURE — 99285 EMERGENCY DEPT VISIT HI MDM: CPT

## 2022-04-29 PROCEDURE — 99223 1ST HOSP IP/OBS HIGH 75: CPT | Performed by: INTERNAL MEDICINE

## 2022-04-29 PROCEDURE — 99291 CRITICAL CARE FIRST HOUR: CPT | Performed by: EMERGENCY MEDICINE

## 2022-04-29 PROCEDURE — 36415 COLL VENOUS BLD VENIPUNCTURE: CPT | Performed by: EMERGENCY MEDICINE

## 2022-04-29 PROCEDURE — 71045 X-RAY EXAM CHEST 1 VIEW: CPT

## 2022-04-29 PROCEDURE — 85730 THROMBOPLASTIN TIME PARTIAL: CPT | Performed by: EMERGENCY MEDICINE

## 2022-04-29 PROCEDURE — 85027 COMPLETE CBC AUTOMATED: CPT | Performed by: EMERGENCY MEDICINE

## 2022-04-29 PROCEDURE — 70498 CT ANGIOGRAPHY NECK: CPT

## 2022-04-29 PROCEDURE — 0241U HB NFCT DS VIR RESP RNA 4 TRGT: CPT | Performed by: EMERGENCY MEDICINE

## 2022-04-29 PROCEDURE — 70496 CT ANGIOGRAPHY HEAD: CPT

## 2022-04-29 PROCEDURE — 96374 THER/PROPH/DIAG INJ IV PUSH: CPT

## 2022-04-29 RX ORDER — INSULIN LISPRO 100 [IU]/ML
4 INJECTION, SOLUTION INTRAVENOUS; SUBCUTANEOUS
Status: DISCONTINUED | OUTPATIENT
Start: 2022-04-29 | End: 2022-04-30 | Stop reason: HOSPADM

## 2022-04-29 RX ORDER — CARVEDILOL 6.25 MG/1
6.25 TABLET ORAL 2 TIMES DAILY WITH MEALS
Status: DISCONTINUED | OUTPATIENT
Start: 2022-04-29 | End: 2022-04-29

## 2022-04-29 RX ORDER — ACETAMINOPHEN 325 MG/1
650 TABLET ORAL EVERY 6 HOURS PRN
Status: DISCONTINUED | OUTPATIENT
Start: 2022-04-29 | End: 2022-04-30 | Stop reason: HOSPADM

## 2022-04-29 RX ORDER — LABETALOL HYDROCHLORIDE 5 MG/ML
10 INJECTION, SOLUTION INTRAVENOUS EVERY 6 HOURS PRN
Status: DISCONTINUED | OUTPATIENT
Start: 2022-04-29 | End: 2022-04-30 | Stop reason: HOSPADM

## 2022-04-29 RX ORDER — TORSEMIDE 10 MG/1
10 TABLET ORAL 3 TIMES WEEKLY
Status: DISCONTINUED | OUTPATIENT
Start: 2022-04-29 | End: 2022-04-30 | Stop reason: HOSPADM

## 2022-04-29 RX ORDER — ASPIRIN 325 MG
325 TABLET ORAL DAILY
Status: DISCONTINUED | OUTPATIENT
Start: 2022-04-30 | End: 2022-04-30

## 2022-04-29 RX ORDER — CALCIUM ACETATE 667 MG/1
1334 CAPSULE ORAL
Status: DISCONTINUED | OUTPATIENT
Start: 2022-04-29 | End: 2022-04-30 | Stop reason: HOSPADM

## 2022-04-29 RX ORDER — CARVEDILOL 6.25 MG/1
6.25 TABLET ORAL 2 TIMES DAILY WITH MEALS
Status: DISCONTINUED | OUTPATIENT
Start: 2022-04-30 | End: 2022-04-30 | Stop reason: HOSPADM

## 2022-04-29 RX ORDER — HEPARIN SODIUM 5000 [USP'U]/ML
5000 INJECTION, SOLUTION INTRAVENOUS; SUBCUTANEOUS EVERY 8 HOURS SCHEDULED
Status: DISCONTINUED | OUTPATIENT
Start: 2022-04-29 | End: 2022-04-30 | Stop reason: HOSPADM

## 2022-04-29 RX ORDER — MECLIZINE HCL 12.5 MG/1
25 TABLET ORAL EVERY 8 HOURS PRN
Status: DISCONTINUED | OUTPATIENT
Start: 2022-04-29 | End: 2022-04-30 | Stop reason: HOSPADM

## 2022-04-29 RX ORDER — DOXERCALCIFEROL 2 UG/ML
7 INJECTION, SOLUTION INTRAVENOUS 3 TIMES WEEKLY
Status: DISCONTINUED | OUTPATIENT
Start: 2022-04-29 | End: 2022-04-30 | Stop reason: HOSPADM

## 2022-04-29 RX ORDER — MELATONIN
1000 DAILY
Status: DISCONTINUED | OUTPATIENT
Start: 2022-04-30 | End: 2022-04-30 | Stop reason: HOSPADM

## 2022-04-29 RX ORDER — ONDANSETRON 2 MG/ML
4 INJECTION INTRAMUSCULAR; INTRAVENOUS EVERY 6 HOURS PRN
Status: DISCONTINUED | OUTPATIENT
Start: 2022-04-29 | End: 2022-04-29

## 2022-04-29 RX ORDER — HYDRALAZINE HYDROCHLORIDE 20 MG/ML
10 INJECTION INTRAMUSCULAR; INTRAVENOUS ONCE
Status: COMPLETED | OUTPATIENT
Start: 2022-04-29 | End: 2022-04-29

## 2022-04-29 RX ORDER — NITROGLYCERIN 0.4 MG/1
0.4 TABLET SUBLINGUAL
Status: DISCONTINUED | OUTPATIENT
Start: 2022-04-29 | End: 2022-04-30 | Stop reason: HOSPADM

## 2022-04-29 RX ORDER — INSULIN LISPRO 100 [IU]/ML
1-6 INJECTION, SOLUTION INTRAVENOUS; SUBCUTANEOUS
Status: DISCONTINUED | OUTPATIENT
Start: 2022-04-29 | End: 2022-04-30 | Stop reason: HOSPADM

## 2022-04-29 RX ORDER — INSULIN GLARGINE 100 [IU]/ML
14 INJECTION, SOLUTION SUBCUTANEOUS DAILY
Status: DISCONTINUED | OUTPATIENT
Start: 2022-04-30 | End: 2022-04-30 | Stop reason: HOSPADM

## 2022-04-29 RX ORDER — CARVEDILOL 6.25 MG/1
6.25 TABLET ORAL 2 TIMES DAILY WITH MEALS
Status: DISCONTINUED | OUTPATIENT
Start: 2022-04-30 | End: 2022-04-29

## 2022-04-29 RX ORDER — CALCIUM CARBONATE 200(500)MG
500 TABLET,CHEWABLE ORAL DAILY PRN
Status: DISCONTINUED | OUTPATIENT
Start: 2022-04-29 | End: 2022-04-30 | Stop reason: HOSPADM

## 2022-04-29 RX ORDER — ATORVASTATIN CALCIUM 40 MG/1
40 TABLET, FILM COATED ORAL EVERY EVENING
Status: DISCONTINUED | OUTPATIENT
Start: 2022-04-29 | End: 2022-04-30 | Stop reason: HOSPADM

## 2022-04-29 RX ADMIN — DOXERCALCIFEROL 7 MCG: 4 INJECTION, SOLUTION INTRAVENOUS at 17:25

## 2022-04-29 RX ADMIN — TORSEMIDE 10 MG: 10 TABLET ORAL at 21:39

## 2022-04-29 RX ADMIN — LABETALOL HYDROCHLORIDE 10 MG: 5 INJECTION, SOLUTION INTRAVENOUS at 14:27

## 2022-04-29 RX ADMIN — IOHEXOL 85 ML: 350 INJECTION, SOLUTION INTRAVENOUS at 10:51

## 2022-04-29 RX ADMIN — CALCIUM ACETATE 1334 MG: 667 CAPSULE ORAL at 21:39

## 2022-04-29 RX ADMIN — ATORVASTATIN CALCIUM 40 MG: 40 TABLET, FILM COATED ORAL at 21:39

## 2022-04-29 RX ADMIN — HEPARIN SODIUM 5000 UNITS: 5000 INJECTION INTRAVENOUS; SUBCUTANEOUS at 21:39

## 2022-04-29 RX ADMIN — HYDRALAZINE HYDROCHLORIDE 5 MG: 20 INJECTION INTRAMUSCULAR; INTRAVENOUS at 12:16

## 2022-04-29 NOTE — ASSESSMENT & PLAN NOTE
· HS trop 70>61>68  · EKG showed mild ST depression in inferior and lateral leads   · Chest pain subsided since BP improved  · Abnl recent stress test  Spoke with Pt's wife  She was told by cardiology that pt may need cardiac cath if no improvement  · He has been having intermittent CP   Last HD had to stop short due to CP during th etreatment  · Will appreciated Card evaluation

## 2022-04-29 NOTE — TREATMENT PLAN
Outpatient records received from [facility]  On Hectorol 7 mcg with each treatment  Also receives Benadryl 25 mg IV push on hemodialysis due to anxiety  Currently not on Venofer or Mircera

## 2022-04-29 NOTE — CONSULTS
Consultation - Stroke   Eden Wen 58 y o  male MRN: [id number]  Unit/Bed#: FT 02 Encounter: [id number]      Assessment/Plan     Dizziness  Assessment & Plan  Eden Wen is a 58 y o  male with history of diabetes mellitus,CKD on dialysis MWF, hypertension, hyperlipidemia, and left lacunar ischemic stroke (2018) who presented as stroke alert on 4/29/2022 10:09 AM with initial NIHSS of 0 and LKW 8:30 am 2 H prior to arrival who presents with dizziness and headache  Initial BP on arrival was Blood Pressure: (!) 223/93  As a result of nondisabling sx patient was determined to not be a candidate for tPA  BP over last 24 hours: Blood Pressure: (!) 194/91  current BP: Blood Pressure: (!) 194/91    Lab Results   Component Value Date    HGBA1C 6 0 (H) 04/19/2022    CHOLESTEROL 76 04/19/2022    LDLCALC 25 04/19/2022    TRIG 116 04/19/2022    INR 1 01 04/29/2022        Imaging:   CTH: No acute intracranial CT abnormality  Stable sequela of old infarcts as described   CTA: No significant interval change compared to CTA 4/19/2022  Stable severe stenosis at distal petrous and proximal cavernous segments of right ICA  Stable high-grade stenosis of bilateral vertebral artery origins  High-grade atherosclerotic stenosis in the mid intradural left vertebral artery  Moderate atherosclerotic disease of right intradural vertebral artery  Atherosclerotic change of cervical carotid arteries (right greater than left) without hemodynamically significant stenosis (less than 50%)  Extensive atherosclerotic disease of bilateral external carotid arteries and branches   MRI: pending   Telemetry: Monitor     Impression: I do have a low suspicion for stroke given his physical exam however patient has multiple vascular risk factors including moderate - severe atherosclerosis of intracranial and extracranial vessels which does increase his risk   Current presentation likely secondary to hypertensive emergency (Baseline BP 130s-150s)  Plan:  · Needs strict blood pressure management  Goal BP: 160-180   Attending recommending MRI brain to rule out stroke  Ordered  Has spinal cord stimulator (currently off and not working)  May need rep present    If unable to do MRI over the weekend, recommend repeat CTH after 24 H   Continue ASA and statin   Do not need to repeat HBA1C, Lipid panel or echocardiogram    Needs dialysis today (M-W-F)    Maintain glucose below 200   Neuro checks   Monitor on telemetry   Medical management as per primary team appreciated    Headache  Assessment & Plan  Patient presents today with headache followed by dizziness in the setting of hypertensive emergency    Plan:  · Strict blood pressure management with goal 160-180  · Tylenol p r n  for headache   · Bedrest      TPA Decision: Patient not a TPA candidate  Symptoms resolved/clearly non disabling  Courtney Ramirez will need follow up in in 2 weeks with neurovascular attending or advance practitioner  He will not require outpatient neurological testing  History of Present Illness     Reason for Consult / Principal Problem: Stroke  Hx and PE limited by:  none  Patient last known well:  8:30 a m  Stroke alert called:  10:36 a m  Neurology time of arrival:  10:37 a m  HPI: Courtney Ramirez is a 58 y o  male with history of diabetes mellitus,CKD on dialysis MWF, hypertension, hyperlipidemia, and left lacunar ischemic stroke (2018)  who presents with headache and dizziness  Last known well was 2 hours prior to arrival   Patient was watching TV when all he started getting a headache which was followed by dizziness      They deny weakness, changes in speech, confusion, nausea or vomiting  Patient able to walk with some loss of balance  Wife states that he last received his dialysis on Wednesday and that he took his insulin this morning  He did not complete full session of dialysis given he got anxious      On arrival to the emergency department BP 223/93 with remainder of vitals normal   Stroke alert was called  NIH is 0  CT head CTA with no acute changes compared to imaging done on 04/20  Recently admitted to the hospital on 4/20/22 under the stroke pathway given right-sided paresthesias, nausea and vomiting  Nausea and vomiting improved with insulin  Workup resulted in no acute stroke findings however there was a chronic stroke that was new from prior imaging in 2020  Patient was discharged on aspirin with order for loop recorder and outpatient neurology follow up  Inpatient consult to Neurology  Consult performed by: Ihsan Riley MD  Consult ordered by: Huy Donaldson MD          Review of Systems   Constitutional: Negative for fever  HENT: Negative for hearing loss  Musculoskeletal: Positive for gait problem  Neurological: Positive for dizziness and headaches  Negative for speech difficulty, weakness and numbness  Psychiatric/Behavioral: The patient is nervous/anxious          Historical Information   Past Medical History:   Diagnosis Date    Cerebrovascular accident (CVA) due to thrombosis of left middle cerebral artery ([facility] [address] ) 7/29/2018    Chronic kidney disease     Diabetes mellitus ([facility] [address] )     GERD (gastroesophageal reflux disease)     Hypercholesteremia     Hyperlipidemia     Hypertension     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    Neuropathy     Obesity     Osteomyelitis ([facility] [address] )     last assessed 11/4/16    PVC's (premature ventricular contractions)     sees cardiology Dr Mo Stands Counts include [facility] beds [facility] the [facility]    Stroke [facility])     last weeof July 2018 [address] [facility]    TIA (transient ischemic attack) 10/28/2018     Past Surgical History:   Procedure Laterality Date    ABDOMINAL SURGERY      CHOLECYSTECTOMY      Percutaneous    COLONOSCOPY      CYSTOSCOPY      OTHER SURGICAL HISTORY      "stimulator to control bowel movements"    MN ESOPHAGOGASTRODUODENOSCOPY TRANSORAL DIAGNOSTIC N/A 9/27/2016    Procedure: ESOPHAGOGASTRODUODENOSCOPY (EGD); Surgeon: Davin Null MD;  [facility]: [facility]; Service: Gastroenterology    GA LAP,CHOLECYSTECTOMY N/A 2/29/2016    Procedure: LAPAROSCOPIC CHOLECYSTECTOMY ;  Surgeon: Rukhsana Roblero DO;  [facility];  Service: General    ROTATOR CUFF REPAIR Right     TOE AMPUTATION Right 10/28/2016    Procedure: 3RD TOE AMPUTATION ;  Surgeon: Bishop Roxanne DPM;  Location[facility];  Service:      Social History   Social History     Substance and Sexual Activity   Alcohol Use Not Currently     Social History     Substance and Sexual Activity   Drug Use No     E-Cigarette/Vaping    E-Cigarette Use Never User      E-Cigarette/Vaping Substances    Nicotine No     THC No     CBD No     Flavoring No     Other No     Unknown No      Social History     Tobacco Use   Smoking Status Never Smoker   Smokeless Tobacco Never Used     Family History: non-contributory    Review of previous medical records was  completed  Meds/Allergies   all current active meds have been reviewed    No Known Allergies    Objective   Vitals:Blood pressure (!) 194/91, pulse 78, temperature 98 °F (36 7 °C), temperature source Oral, resp  rate 19, weight 109 kg (239 lb 3 2 oz), SpO2 98 %  ,Body mass index is 35 32 kg/m²  No intake or output data in the 24 hours ending 04/29/22 1344    Invasive Devices: Invasive Devices  Report    Peripheral Intravenous Line            Peripheral IV 04/19/22 Right Antecubital 10 days    Peripheral IV 04/29/22 Right Antecubital <1 day          Line            Hemodialysis AV Fistula Left Upper arm -- days                Physical Exam  Eyes:      Extraocular Movements: EOM normal       Pupils: Pupils are equal, round, and reactive to light     Neurological:      Coordination: Finger-Nose-Finger Test normal       Deep Tendon Reflexes: Strength normal        Neurologic Exam     Mental Status   Oriented to age and month      Cranial Nerves     CN II   Visual fields full to confrontation  CN III, IV, VI   Pupils are equal, round, and reactive to light  Extraocular motions are normal      CN V   Facial sensation intact  CN VII   Facial expression full, symmetric  CN VIII   CN VIII normal      CN XII   CN XII normal      Motor Exam     Strength   Strength 5/5 throughout  Sensory Exam   Light touch normal    Pinprick normal      Gait, Coordination, and Reflexes     Coordination   Finger to nose coordination: normalPositive romberg        NIHSS:  1a Level of Consciousness: 0 = Alert   1b  LOC Questions: 0 = Answers both correctly   1c  LOC Commands: 0 = Obeys both correctly   2  Best Gaze: 0 = Normal   3  Visual: 0 = No visual field loss   4  Facial Palsy: 0=Normal symmetric movement   5a  Motor Right Arm: 0=No drift, limb holds 90 (or 45) degrees for full 10 seconds   5b  Motor Left Arm: 0=No drift, limb holds 90 (or 45) degrees for full 10 seconds   6a  Motor Right Le=No drift, limb holds 90 (or 45) degrees for full 10 seconds   6b  Motor Left Le=No drift, limb holds 90 (or 45) degrees for full 10 seconds   7  Limb Ataxia:  0=Absent   8  Sensory: 0=Normal; no sensory loss   9  Best Language:  0=No aphasia, normal   10  Dysarthria: 0=Normal articulation   11  Extinction and Inattention (formerly Neglect): 0=No abnormality   Total Score: 0     Time NIHSS was completed: 1050    Modified Evangelina Score:  3 (Moderate disability; requiring some help, but able to walk without assistance)    Lab Results: I have personally reviewed pertinent reports  Imaging Studies: I have personally reviewed pertinent reports  EKG, Pathology, and Other Studies: I have personally reviewed pertinent reports      VTE Prophylaxis: Sequential compression device Alphonse Bagley)     Code Status: Prior  Advance Directive and Living Will:      Power of :    POLST:      Counseling / Coordination of Care  Total Critical Care time spent 61 minutes excluding procedures, teaching and family updates

## 2022-04-29 NOTE — ASSESSMENT & PLAN NOTE
Shara Pretty is a 58 y o  male with history of diabetes mellitus,CKD on dialysis MWF, hypertension, hyperlipidemia, and left lacunar ischemic stroke (2018) who presented as stroke alert on 4/29/2022 10:09 AM with initial NIHSS of 0 and LKW 8:30 am 2 H prior to arrival who presents with dizziness and headache  Initial BP on arrival was Blood Pressure: (!) 223/93  As a result of nondisabling sx patient was determined to not be a candidate for tPA  BP over last 24 hours: Blood Pressure: 143/74  current BP: Blood Pressure: 143/74    Lab Results   Component Value Date    HGBA1C 6 0 (H) 04/19/2022    CHOLESTEROL 76 04/19/2022    LDLCALC 25 04/19/2022    TRIG 116 04/19/2022    INR 1 01 04/29/2022        Imaging:   CTH: No acute, stable chronic   CTA: No changes from previously noted severe intracranial stenosis, no acute findings   Repeat CTH from 4/30 - ordered    Impression: low suspicion for stroke given his physical exam however patient has multiple vascular risk factors including moderate/severe atherosclerosis of intracranial and extracranial vessels which does increase his risk  Current presentation likely secondary to hypertensive emergency (Baseline BP 130s-150s)      Plan:  · Strict blood pressure control, normotension today   Was recommended for MRI however pt has non functional spinal cord stimulator requiring rep to come in, as discussed with medicine team, repeat [address] today is appropriate instead   Continue home stroke prevention regimen   Do not need to repeat HBA1C, Lipid panel or echocardiogram    Maintain glucose below 200   Medical management as per primary team appreciated   After CTH today, no further inpatient workup, follow up with neurology as previously planned

## 2022-04-29 NOTE — ASSESSMENT & PLAN NOTE
Lab Results   Component Value Date    HGBA1C 6 0 (H) 04/19/2022       Recent Labs     04/29/22  1036   POCGLU 144*       Blood Sugar Average: Last 72 hrs:  (P) 144     Recent A1c 6 at goal   Continue home Lantus and humalog

## 2022-04-29 NOTE — ASSESSMENT & PLAN NOTE
· Acute onset vertigo with room spinning sensation, severe headache and elevated blood pressure this morning  · Multiple risk factors for CVA  · Hypertensive emergency vs R/O CVA  · CTA H/N showed extensive atherosclerotic changes including high-grade stenosis of bilateral vertebral arteries  · Seen by Neurology  Stroke pathway for now  MRI brain ordered  · ASA/Lipitor   Permissive hypertension -180 for now

## 2022-04-29 NOTE — ASSESSMENT & PLAN NOTE
Patient presents today with headache followed by dizziness in the setting of hypertensive emergency    Plan:  · Strict blood pressure management with goal 160-180  · Tylenol p r n  for headache   · Bedrest

## 2022-04-29 NOTE — ASSESSMENT & PLAN NOTE
Lab Results   Component Value Date    EGFR 6 04/29/2022    EGFR 6 04/20/2022    EGFR 8 04/19/2022    CREATININE 8 06 (H) 04/29/2022    CREATININE 7 94 (H) 04/20/2022    CREATININE 6 52 (H) 04/19/2022   HD M-W-F     Getting HD now  Renal input appreciated

## 2022-04-29 NOTE — TELEPHONE ENCOUNTER
----- Message from Anisa Veras MD sent at 4/22/2022 10:22 AM EDT -----  Regarding: Stroke HFU  Hi Tiffany! Mr Michaela Silver is a patient of Dr Jody Hartman who needs stroke hospital follow up with [facility] in two weeks  Can you help with this? His upcoming appt with Dr Brina Stuart was canceled  Brina Stuart is aware of his case and is okay him being seen by [facility]  Thank you!    Abhishek Huff

## 2022-04-29 NOTE — CONSULTS
[address] [facility] 58 y o  male MRN: [id number]  Unit/Bed#: FT 02 Encounter: [id number]    ASSESSMENT and PLAN:  1  ESRD on HD ([address], Monday, Wednesday, Friday):   · Last treatment Wednesday, 4/27  Only completed 3 hours of treatment  · Target weight 105 kg  · Outpatient prescription:  Filter:  N180, Sodium 138, bicarbonate 40, temperature 36 5° C, blood flow 450 mL a minute  · Significant anxiety issues at outpatient dialysis  From review of outpatient records in Care everywhere it appears that with every hemodialysis treatment he is complaining of various kinds of pain  He sometimes becomes agitated and requires reduced treatment time  Last treatment was Wednesday  He reports that he was taken off 1 hour prior to the end of treatment  · Plan:  · Hemodialysis today  2  Access:    · Av fistula left arm  3  Hypertension/volume status:    · Blood pressure elevated  May be somewhat volume mediated, anxiety  · Chest x-ray:  Cardiomegaly, mild vascular congestion  · Home medications:  Carvedilol 6 125 mg twice a day, torsemide 50 mg on non dialysis days  · Plan:  · Hemodialysis, volume removal as tolerated  4  Anemia:    · Current hemoglobin slightly above target, 11 3  · Appears to be usually at target at outpatient dialysis  Hemoglobin between 10 4-11  3   · Navin on hold at outpatient dialysis since he was within or slightly above target  · On Venofer  [facility] called for Venofer dose  · On active transplant list at [facility]  · Plan:  · Management per primary team   Consider transfusion for hemoglobin less than 7  Transfuse only leuko reduced packed cells   5  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:    · Per review of outpatient records, PTH above target  · Plan   · Continue Hectorol 7 mcg at outpatient dialysis  · Renal diet  6   Headache:  · History of left sided lacunar stroke 2018  · Patient reports concern for stroke-like symptoms although only having headache  Denies weakness  · COVID, flu testing negative  · Was recently hospitalized at [facility] and discharged on 04/21 with right-sided pain and paresthesia  No evidence of CVA  CT of the head and neck showed stable chronic small vessel ischemic changes and moderate atherosclerotic disease  He was maintained on aspirin, Plavix and statin  7  Diabetes mellitus type 2:  Management per primary team  8  Elevated troponin:  Management per primary team    SUMMARY OF RECOMMENDATIONS:   Hemodialysis today    HISTORY OF PRESENT ILLNESS:  Requesting Physician: Eulogio Kasper MD  Reason for Consult: ESRD on HD    Rose Contreras is a 58 y o  male with a past medical history of ESRD on dialysis, diabetes mellitus type 2, hypertension, CVA who was admitted to [facility] after presenting with headache  He reports waking up this morning having breakfast then watching TV and developing headache  He came to the hospital because he thinks he might be having a stroke  He was just hospitalized at [facility] and discharged on 04/21 after being evaluated for stroke-like symptoms  Blood pressure 223/93 on admission  No focal deficits noted  Extremely anxious/inappropriate behavior for circumstances  Asking me when he is going to get his kidney transplant  A renal consultation is requested today for assistance in the management of ESRD  Rose Contreras is a known ESRD patient who undergoes maintenance hemodialysis at [facility] on Monday, Wednesday, Friday      PAST MEDICAL HISTORY:  Past Medical History:   Diagnosis Date    Cerebrovascular accident (CVA) due to thrombosis of left middle cerebral artery ([facility] [address] ) 7/29/2018    Chronic kidney disease     Diabetes mellitus ([facility] [address] )     GERD (gastroesophageal reflux disease)     Hypercholesteremia     Hyperlipidemia     Hypertension     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    Neuropathy     Obesity     Osteomyelitis ([facility] [address] ) last assessed 11/4/16    PVC's (premature ventricular contractions)     sees cardiology Dr Emily camargo    [facility]roke [facility])     last weeof July 2018 [facility]    TIA (transient ischemic attack) 10/28/2018       PAST SURGICAL HISTORY:  Past Surgical History:   Procedure Laterality Date    ABDOMINAL SURGERY      CHOLECYSTECTOMY      Percutaneous    COLONOSCOPY      CYSTOSCOPY      OTHER SURGICAL HISTORY      "stimulator to control bowel movements"    MI ESOPHAGOGASTRODUODENOSCOPY TRANSORAL DIAGNOSTIC N/A 9/27/2016    Procedure: ESOPHAGOGASTRODUODENOSCOPY (EGD); Surgeon: Tim Austin MD;  [facility]: [facility]; Service: Gastroenterology    MI LAP,CHOLECYSTECTOMY N/A 2/29/2016    Procedure: LAPAROSCOPIC CHOLECYSTECTOMY ;  Surgeon: Pop Charles DO;  Location[facility];  Service: General    ROTATOR CUFF REPAIR Right     TOE AMPUTATION Right 10/28/2016    Procedure: 3RD TOE AMPUTATION ;  Surgeon: Elidia Brunner DPM;  [facility];  Service:        ALLERGIES:  No Known Allergies    SOCIAL HISTORY:  Social History     Substance and Sexual Activity   Alcohol Use Not Currently     Social History     Substance and Sexual Activity   Drug Use No     Social History     Tobacco Use   Smoking Status Never Smoker   Smokeless Tobacco Never Used       FAMILY HISTORY:  Family History   Problem Relation Age of Onset    Leukemia Mother     Liver disease Mother     Lung cancer Mother         heavy smoker - 3 ppd    Heart disease Father     Liver disease Father     Multiple myeloma Sister     Breast cancer Sister     Urolithiasis Family     Alcohol abuse Neg Hx     Depression Neg Hx     Drug abuse Neg Hx     Substance Abuse Neg Hx     Mental illness Neg Hx        MEDICATIONS:  No current facility-administered medications for this encounter      Current Outpatient Medications:     aluminum-magnesium hydroxide-simethicone (MYLANTA) 200-200-20 mg/5 mL suspension, Take 30 mL by mouth every 4 (four) hours as needed for indigestion or heartburn, Disp: 355 mL, Rfl: 0    aspirin (ECOTRIN LOW STRENGTH) 81 mg EC tablet, Take 81 mg by mouth daily Resume on 8/14  , Disp: , Rfl:     atorvastatin (LIPITOR) 40 mg tablet, Take 1 tablet (40 mg total) by mouth daily with dinner, Disp: 90 tablet, Rfl: 1    BD Pen Needle Adina U/F 32G X 4 MM MISC, USE TO INJECT INSULIN 4 TIMES DAILY, Disp: 400 each, Rfl: 1    Blood Glucose Monitoring Suppl (True Metrix Meter) w/Device KIT, Use to test blood sugars 3 times daily, Disp: 1 kit, Rfl: 0    Blood Pressure Monitoring (BLOOD PRESSURE CUFF) MISC, Use to check blood pressure before taking blood pressure medication and 1 hour after and follow instructions provided in discharge instructions based on the readings  , Disp: 1 each, Rfl: 0    calcium acetate (CALPHRON) 667 mg, TAKE 2 TABLETS BY MOUTH THREE TIMES DAILY WITH MEALS, Disp: , Rfl:     calcium carbonate (TUMS) 500 mg chewable tablet, Chew 1 tablet (500 mg total) daily as needed for indigestion or heartburn, Disp: , Rfl: 0    carvedilol (COREG) 6 25 mg tablet, Take 1 tablet (6 25 mg total) by mouth 2 (two) times a day with meals Or as directed by your kidney doctor, Disp: 180 tablet, Rfl: 1    Cholecalciferol (Vitamin D3) 1 25 MG (58167 UT) CAPS, TAKE 1 CAPSULE BY MOUTH ONE TIME PER WEEK, Disp: 12 capsule, Rfl: 2    Continuous Blood Gluc  (DEXCOM G6 ) LANCE, Use as directed for continuous glucose monitoring, Disp: 1 Device, Rfl: 0    Continuous Blood Gluc Sensor (DEXCOM G6 SENSOR) MISC, Use as directed for continuous glucose monitoring   Change every 10 days, Disp: 1 each, Rfl: 11    Continuous Blood Gluc Transmit (DEXCOM G6 TRANSMITTER) MISC, Use as directed for continuous glucose monitoring-Change every 3 months, Disp: 1 each, Rfl: 3    doxercalciferol (HECTOROL) 0 5 mcg capsule, 4 mcg, Disp: , Rfl:     glucose blood (True Metrix Blood Glucose Test) test strip, Use 1 each 3 (three) times a day Use as instructed, Disp: 100 each, Rfl: 0    Incontinence Supplies (MALE URINAL) MISC, by Does not apply route daily, Disp: 6 each, Rfl: 3    insulin lispro (HumaLOG KwikPen) 100 units/mL injection pen, INJECT 4 UNITS 3 TIMES A DAY WITH MEALS PLUS SCALE (max daily dose 42 units), Disp: 45 mL, Rfl: 1    Insulin Syringe-Needle U-100 (B-D INS SYRINGE 0 5CC/30GX1/2") 30G X 1/2" 0 5 ML MISC, Inject under the skin 4 (four) times a day, Disp: 360 each, Rfl: 1    Lancets Ultra Thin 30G MISC, Use 3 times a day, Disp: 300 each, Rfl: 0    Lantus 100 UNIT/ML subcutaneous injection, INJECT 14 UNITS UNDER THE SKIN DAILY, Disp: 10 mL, Rfl: 0    meclizine (ANTIVERT) 25 mg tablet, Take 1 tablet (25 mg total) by mouth every 8 (eight) hours as needed for dizziness or nausea, Disp: 30 tablet, Rfl: 0    Nutritional Supplements (VITAMIN D BOOSTER PO), Take 0 5 mcg by mouth  , Disp: , Rfl:     ONETOUCH DELICA LANCETS 29B MISC, by Does not apply route 3 (three) times a day, Disp: 270 each, Rfl: 1    TORSEMIDE PO, Take 50 mg by mouth Pt takes 50 mg daily on non- dialysis days: sat, sun, Tues, and thrusday  Pt takes 10 mg of torsemide daily on dialysis days m- w- f  , Disp: , Rfl:     REVIEW OF SYSTEMS:  Constitutional:  Denies fevers or chills  HENT: Negative for postnasal drip  Eyes: Negative for visual disturbance  Respiratory:  No cough for shortness of breath reported  Cardiovascular: Negative for chest pain, palpitations  Reports leg swelling but only reports left leg being swollen  Gastrointestinal: Negative for abdominal pain, constipation, diarrhea, nausea and vomiting  Genitourinary: No dysuria, hematuria  Musculoskeletal:  No unusual arthralgia reported  Skin: Negative for rash  Neurological: Negative for focal weakness  Positive for headache  Hematological: Negative for  bleeding    Psychiatric/Behavioral:  Positive for anxiety  All the systems were reviewed and were negative except as documented on the [facility]  PHYSICAL EXAM:  Current Weight: Weight - Scale: 109 kg (239 lb 3 2 oz)  First Weight: Weight - Scale: 109 kg (239 lb 3 2 oz)  Vitals:    04/29/22 1130 04/29/22 1145 04/29/22 1200 04/29/22 1232   BP: (!) 193/83 (!) 180/79 (!) 180/88 (!) 194/91   BP Location:       Pulse: 78 84 78    Resp:  16 19    Temp:       TempSrc:       SpO2: 95% 98% 98%    Weight:         No intake or output data in the 24 hours ending 04/29/22 1304  Physical Exam  Vitals reviewed  Constitutional:       General: He is not in acute distress  Appearance: He is well-developed  He is not ill-appearing, toxic-appearing or diaphoretic  HENT:      Head: Normocephalic and atraumatic  Nose: Nose normal  No congestion  Mouth/Throat:      Mouth: Mucous membranes are moist       Pharynx: No oropharyngeal exudate  Eyes:      General: No scleral icterus  Right eye: No discharge  Left eye: No discharge  Extraocular Movements: Extraocular movements intact  Conjunctiva/sclera: Conjunctivae normal    Neck:      Vascular: No carotid bruit or JVD  Trachea: No tracheal deviation  Cardiovascular:      Rate and Rhythm: Normal rate and regular rhythm  Heart sounds: No murmur heard  No friction rub  No gallop  Pulmonary:      Effort: Pulmonary effort is normal  No respiratory distress  Breath sounds: Normal breath sounds  No stridor  No wheezing, rhonchi or rales  Abdominal:      General: Bowel sounds are normal  There is no distension  Palpations: Abdomen is soft  There is no mass  Tenderness: There is no abdominal tenderness  There is no guarding or rebound  Musculoskeletal:         General: Normal range of motion  Cervical back: Normal range of motion and neck supple  No rigidity or tenderness  Right lower leg: No edema  Left lower leg: No edema  Skin:     General: Skin is warm and dry  Coloration: Skin is not jaundiced or pale        Findings: No erythema or rash  Neurological:      General: No focal deficit present  Mental Status: He is alert and oriented to person, place, and time  Psychiatric:         Mood and Affect: Mood is anxious  Behavior: Behavior is aggressive  Thought Content:  Thought content normal          Judgment: Judgment normal           Invasive Devices:      Lab Results:   Results from last 7 days   Lab Units 04/29/22  1015   WBC Thousand/uL 6 19   HEMOGLOBIN g/dL 11 3*   HEMATOCRIT % 35 7*   PLATELETS Thousands/uL 162   POTASSIUM mmol/L 4 9   CHLORIDE mmol/L 103   CO2 mmol/L 25   BUN mg/dL 62*   CREATININE mg/dL 8 06*   CALCIUM mg/dL 9 1     Lab Results   Component Value Date    CALCIUM 9 1 04/29/2022    PHOS 5 9 (H) 01/02/2021     Other Studies:

## 2022-04-29 NOTE — H&P
[facility]  H&P- Azul Mcintosh 1960, 58 y o  male MRN: [id number]  Unit/Bed#: S -01 Encounter: [id number]  Primary Care Provider: Pedro Cook DO   Date and time admitted to hospital: 4/29/2022 10:09 AM    * Vertigo  Assessment & Plan  · Acute onset vertigo with room spinning sensation, severe headache and elevated blood pressure this morning  · Multiple risk factors for CVA  · Hypertensive emergency vs R/O CVA  · CTA H/N showed extensive atherosclerotic changes including high-grade stenosis of bilateral vertebral arteries  · Seen by Neurology  Stroke pathway for now  MRI brain ordered  · ASA/Lipitor  Permissive hypertension -180 for now    Elevated troponin  Assessment & Plan  · HS trop 70>61>68  · EKG showed mild ST depression in inferior and lateral leads   · Chest pain subsided since BP improved  · Abnl recent stress test  Spoke with Pt's wife  She was told by cardiology that pt may need cardiac cath if no improvement  · He has been having intermittent CP  Last HD had to stop short due to CP during th etreatment  · Will appreciated Card evaluation      Headache  Assessment & Plan  /93 on arrival  Headache now improved after BP better controlled    ESRD on dialysis [facility])  Assessment & Plan  Lab Results   Component Value Date    EGFR 6 04/29/2022    EGFR 6 04/20/2022    EGFR 8 04/19/2022    CREATININE 8 06 (H) 04/29/2022    CREATININE 7 94 (H) 04/20/2022    CREATININE 6 52 (H) 04/19/2022   HD M-W-F     Getting HD now  Renal input appreciated    Diabetic polyneuropathy associated with type 2 diabetes mellitus [facility])  Assessment & Plan  Lab Results   Component Value Date    HGBA1C 6 0 (H) 04/19/2022       Recent Labs     04/29/22  1036   POCGLU 144*       Blood Sugar Average: Last 72 hrs:  (P) 144     Recent A1c 6 at goal   Continue home Lantus and humalog     VTE Prophylaxis: Heparin  / sequential compression device   Code Status: Full code  POLST: POLST form is not discussed and not completed at this time  Discussion with family: Wife    Anticipated Length of Stay:  Patient will be admitted on an Inpatient basis with an anticipated length of stay of  > 2 midnights  Justification for Hospital Stay: Above    Total Time for Visit, including Counseling / Coordination of Care: 30 minutes  Greater than 50% of this total time spent on direct patient counseling and coordination of care  Chief Complaint:   Headache, Vertigo and chest pain    History of Present Illness:    zAul Mcintosh is a 58 y o  male with PMHx of ESRD on HD M-W-F, IDDM, HTN, Prior CVA, dyslipidemia and obesity who presents with headache, dizziness/vertigo and chest pain  Patient was recently discharged after being admitted for stroke-like symptoms  MRI was negative at that time  He developed acute onset throbbing headache this morning while watching TV, associated with dizziness and room spinning sensation  He was somewhat unsteady when attempting to walk  He then also noted midsternal, nonradiating chest pain,  associated with nausea and diaphoresis  Patient states that he has been having intermittent chest pain  Last chest pain was two days ago during dialysis treatment when HD had to stop short  Apparently patient had an abnormal stress test recently  Patient's wife stated that his cardiologist had mentioned cardiac catheterization if he does not improve with medications  Patient was seen by Neurology in the ED  He is recommended for stroke pathway, MRI brain was ordered  His BP on arrival and was 195-223/ 80-93  Improved to current 152/88  Headache and CP both subsided    Review of Systems:    Review of Systems   Constitutional: Negative for chills, diaphoresis and fatigue  Respiratory: Negative for cough, choking and shortness of breath  Cardiovascular: Positive for chest pain  Gastrointestinal: Positive for nausea  Negative for abdominal pain and vomiting     Neurological: Positive for dizziness and headaches  All other systems reviewed and are negative  Past Medical and Surgical History:     Past Medical History:   Diagnosis Date    Cerebrovascular accident (CVA) due to thrombosis of left middle cerebral artery ([facility] ) 7/29/2018    Chronic kidney disease     [facility]     GERD (gastroesophageal reflux disease)     Hypercholesteremia     Hyperlipidemia     Hypertension     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    Neuropathy     Obesity     Osteomyelitis ([facility] )     last assessed 11/4/16    PVC's (premature ventricular contractions)     sees cardiology Dr Parker Doing camargo    Stroke [facility])     last weeof July 2018 [address] [facility]    TIA (transient ischemic attack) 10/28/2018       Past Surgical History:   Procedure Laterality Date    ABDOMINAL SURGERY      CHOLECYSTECTOMY      Percutaneous    COLONOSCOPY      CYSTOSCOPY      OTHER SURGICAL HISTORY      "stimulator to control bowel movements"    MS ESOPHAGOGASTRODUODENOSCOPY TRANSORAL DIAGNOSTIC N/A 9/27/2016    Procedure: ESOPHAGOGASTRODUODENOSCOPY (EGD); Surgeon: Sanjeev Naqvi MD;  Location: [facility]; Service: Gastroenterology    MS LAP,CHOLECYSTECTOMY N/A 2/29/2016    Procedure: LAPAROSCOPIC CHOLECYSTECTOMY ;  Surgeon: Austen Carl DO;  Location: [facility];  Service: General    ROTATOR CUFF REPAIR Right     TOE AMPUTATION Right 10/28/2016    Procedure: 3RD TOE AMPUTATION ;  Surgeon: Jesse Ramirez DPM;  Location: [facility];  Service:        Meds/Allergies:    Prior to Admission medications    Medication Sig Start Date End Date Taking?  Authorizing Provider   aluminum-magnesium hydroxide-simethicone (MYLANTA) 200-200-20 mg/5 mL suspension Take 30 mL by mouth every 4 (four) hours as needed for indigestion or heartburn 4/21/22   Sadi Balbuena MD   aspirin (ECOTRIN LOW STRENGTH) 81 mg EC tablet Take 81 mg by mouth daily Resume on 8/14      Historical Provider, MD atorvastatin (LIPITOR) 40 mg tablet Take 1 tablet (40 mg total) by mouth daily with dinner 12/13/21   Raj Auguste DO   BD Pen Needle Adina U/F 32G X 4 MM MISC USE TO INJECT INSULIN 4 TIMES DAILY 5/19/21   Lissette Brennan PA-C   Blood Glucose Monitoring Suppl (True Metrix Meter) w/Device KIT Use to test blood sugars 3 times daily 3/28/22   Lissette Brennan PA-C   Blood Pressure Monitoring (BLOOD PRESSURE CUFF) MISC Use to check blood pressure before taking blood pressure medication and 1 hour after and follow instructions provided in discharge instructions based on the readings  8/13/18   Sae Nogueira MD   calcium acetate (CALPHRON) 667 mg TAKE 2 TABLETS BY MOUTH THREE TIMES DAILY WITH MEALS 10/17/21   Historical Provider, MD   calcium carbonate (TUMS) 500 mg chewable tablet Chew 1 tablet (500 mg total) daily as needed for indigestion or heartburn 4/21/22   Lisa Frederick MD   carvedilol (COREG) 6 25 mg tablet Take 1 tablet (6 25 mg total) by mouth 2 (two) times a day with meals Or as directed by your kidney doctor 2/3/22   Laura Chew DO   Cholecalciferol (Vitamin D3) 1 25 MG (35894 UT) CAPS TAKE 1 CAPSULE BY MOUTH ONE TIME PER WEEK 10/6/21   Toya Self MD   Continuous Blood Gluc  (DEXCOM G6 ) LANCE Use as directed for continuous glucose monitoring 10/28/19   Lissette Brennan PA-C   Continuous Blood Gluc Sensor (DEXCOM G6 SENSOR) MISC Use as directed for continuous glucose monitoring   Change every 10 days 10/28/19   Lissette Brennan PA-C   Continuous Blood Gluc Transmit (DEXCOM G6 TRANSMITTER) MISC Use as directed for continuous glucose monitoring-Change every 3 months 10/28/19   Lissette Brennan PA-C   doxercalciferol (HECTOROL) 0 5 mcg capsule 4 mcg 11/10/21 11/9/22  Historical Provider, MD   glucose blood (True Metrix Blood Glucose Test) test strip Use 1 each 3 (three) times a day Use as instructed 4/12/22   Lisa Valdez MD   Incontinence Supplies (MALE URINAL) MISC by Does not apply route daily 9/24/18   Raj Auguste DO   insulin lispro (HumaLOG KwikPen) 100 units/mL injection pen INJECT 4 UNITS 3 TIMES A DAY WITH MEALS PLUS SCALE (max daily dose 42 units) 4/7/22   Arturo Ivey MD   Insulin Syringe-Needle U-100 (B-D INS SYRINGE 0 5CC/30GX1/2") 30G X 1/2" 0 5 ML MISC Inject under the skin 4 (four) times a day 6/5/20   Arturo Ivey MD   Lancets Ultra Thin 30G MISC Use 3 times a day 3/28/22   Lissette Brennan PA-C   Lantus 100 UNIT/ML subcutaneous injection INJECT 14 UNITS UNDER THE SKIN DAILY 4/13/22   Lissette Brennan PA-C   meclizine (ANTIVERT) 25 mg tablet Take 1 tablet (25 mg total) by mouth every 8 (eight) hours as needed for dizziness or nausea 4/7/22   Michael Pineda MD   Nutritional Supplements (VITAMIN D BOOSTER PO) Take 0 5 mcg by mouth   6/21/21 6/20/22  Historical Provider, MD Cat Kinds LANCETS 10H MISC by Does not apply route 3 (three) times a day 11/27/18   Raj Auguste DO   TORSEMIDE PO Take 50 mg by mouth Pt takes 50 mg daily on non- dialysis days: sat, sun, Tues, and thrusday   Pt takes 10 mg of torsemide daily on dialysis days m- w- f   10/25/21   Historical Provider, MD       Allergies: No Known Allergies    Social History:     Marital Status:   Occupation:   Patient Pre-hospital Living Situation: Home    Social History     Substance and Sexual Activity   Alcohol Use Not Currently     Social History     Tobacco Use   Smoking Status Never Smoker   Smokeless Tobacco Never Used     Social History     Substance and Sexual Activity   Drug Use No       Family History:    Family History   Problem Relation Age of Onset    Leukemia Mother     Liver disease Mother     Lung cancer Mother         heavy smoker - 3 ppd    Heart disease Father     Liver disease Father     Multiple myeloma Sister     Breast cancer Sister     Urolithiasis Family     Alcohol abuse Neg Hx     Depression Neg Hx     Drug abuse Neg Hx     Substance Abuse Neg Hx     Mental illness Neg Hx Physical Exam:     Vitals:   Blood Pressure: 134/59 (04/29/22 1745)  Pulse: 80 (04/29/22 1745)  Temperature: 97 6 °F (36 4 °C) (04/29/22 1600)  Temp Source: Tympanic (04/29/22 1600)  Respirations: 18 (04/29/22 1745)  Height: 5' 9" (175 3 cm) (04/29/22 1515)  Weight - Scale: 109 kg (240 lb 4 8 oz) (04/29/22 1515)  SpO2: 99 % (04/29/22 1745)    Physical Exam  Constitutional:       General: He is not in acute distress  Appearance: He is not ill-appearing, toxic-appearing or diaphoretic  Eyes:      General:         Right eye: No discharge  Left eye: No discharge  Cardiovascular:      Rate and Rhythm: Normal rate and regular rhythm  Pulses: Normal pulses  Heart sounds: No murmur heard  Pulmonary:      Effort: Pulmonary effort is normal  No respiratory distress  Breath sounds: Normal breath sounds  No wheezing  Abdominal:      General: Abdomen is flat  Bowel sounds are normal  There is no distension  Palpations: Abdomen is soft  Tenderness: There is no abdominal tenderness  Musculoskeletal:         General: No swelling  Skin:     General: Skin is warm  Neurological:      Mental Status: He is oriented to person, place, and time  Sensory: No sensory deficit  Psychiatric:         Mood and Affect: Mood is anxious           Behavior: Behavior normal            Additional Data:     Lab Results:    Results from last 7 days   Lab Units 04/29/22  1015   WBC Thousand/uL 6 19   HEMOGLOBIN g/dL 11 3*   HEMATOCRIT % 35 7*   PLATELETS Thousands/uL 162     Results from last 7 days   Lab Units 04/29/22  1015   SODIUM mmol/L 141   POTASSIUM mmol/L 4 9   CHLORIDE mmol/L 103   CO2 mmol/L 25   BUN mg/dL 62*   CREATININE mg/dL 8 06*   ANION GAP mmol/L 13   CALCIUM mg/dL 9 1   GLUCOSE RANDOM mg/dL 130     Results from last 7 days   Lab Units 04/29/22  1015   INR  1 01     Results from last 7 days   Lab Units 04/29/22  1036   POC GLUCOSE mg/dl 144*               Imaging:     XR chest 1 view   Final Result by Trixie Medina MD (04/29 1130)      Development of cardiomegaly and mild vascular congestion                  Workstation performed: [id number]         CTA stroke alert (head/neck)   Final Result by Adelita Vera MD (04/29 1114)      1  No significant interval change compared to CTA 4/19/2022       2   Stable severe stenosis at distal petrous and proximal cavernous segments of right ICA  3   Stable high-grade stenosis of bilateral vertebral artery origins  High-grade atherosclerotic stenosis in the mid intradural left vertebral artery  Moderate atherosclerotic disease of right intradural vertebral artery  4   Atherosclerotic change of cervical carotid arteries (right greater than left) without hemodynamically significant stenosis (less than 50%)  5   Extensive atherosclerotic disease of bilateral external carotid arteries and branches  I personally discussed this study with Dr Travon Steven on 4/29/2022 at 10:59 AM                             Workstation performed: [id number]         CT stroke alert brain   Final Result by Adelita Vera MD (04/29 1116)      No acute intracranial CT abnormality  Stable sequela of old infarcts as described  I personally discussed this study with Dr Travon Steven on 4/29/2022 at 10:59 AM          Workstation performed: [id number]         MRI inpatient order    (Results Pending)       EKG, Pathology, and Other Studies Reviewed on Admission:   · EKG:  Sinus rhythm/mild ST depressions noted in the inferior and lateral leads    Allscripts / Epic Records Reviewed: Yes     ** Please Note: This note has been constructed using a voice recognition system   **

## 2022-04-29 NOTE — PLAN OF CARE
Patient is for a 2 hour HD session on a 2K2 5Ca bath for a serum potassium of 4 9 mmol/L and a net UF goal of 2L as tolerated per Dr Brenda Oliveira     Post-Dialysis RN Treatment Note    Blood Pressure:  Pre 179/75 mm/Hg  Post 96/52 mmHg   EDW  105 kg    Weight:  Pre 109 kg   Post 107 8 kg   Mode of weight measurement: Bed Scale   Volume Removed  2500 ml/ gross goal    Treatment duration 100 minutes    NS given  No    Treatment shortened? Yes, patient requested tx be ended due to stomach pain, primary RN made aware and Dr Brenda Oliveira notified  Medications given during Rx 7 mcg Hectoral   Estimated Kt/V  Not Applicable   Access type: AV fistula   Access Issues: No - unable to run at rx BFR due to patient's restlessness and position while sleeping; Dr Brenda Oliveira made aware via [employer]     Report called to primary nurse   Yes, Stefanie Kimbrough     Problem: METABOLIC, FLUID AND ELECTROLYTES - ADULT  Goal: Electrolytes maintained within normal limits  Description: INTERVENTIONS:  - Monitor labs and assess patient for signs and symptoms of electrolyte imbalances  - Administer electrolyte replacement as ordered  - Monitor response to electrolyte replacements, including repeat lab results as appropriate  - Instruct patient on fluid and nutrition as appropriate  Outcome: Progressing  Goal: Fluid balance maintained  Description: INTERVENTIONS:  - Monitor labs   - Monitor I/O and WT  - Instruct patient on fluid and nutrition as appropriate  - Assess for signs & symptoms of volume excess or deficit  Outcome: Progressing
no

## 2022-04-29 NOTE — ED PROVIDER NOTES
History  Chief Complaint   Patient presents with    Headache     pt states about 2 hours ago pt started with a headache and chest pain, chest pain radiates down both arms and pt states numbness/tingling down both arms as well  pt has no neuro deficits in triage, hx of stroke    Chest Pain     HPI     58-year-old male with history of ESRD on HD on M/W/F, last dialyzed 2 days ago, prior stroke without residual deficits  Patient presents for evaluation of severe headache with constant sensation of the room spinning that started at 8:30 a m  while watching TV  He is able to walk but feels unsteady with doing so  Headache started then became more severe  No vision changes  Reports chest pain that is described as a sharp feeling in the middle of his chest radiating to both arms  Denies radiation of the pain to his back, neck, or jaw  No shortness of breath  No fevers, chills, nausea, vomiting, diarrhea, or difficulty urinating  Prior to Admission Medications   Prescriptions Last Dose Informant Patient Reported? Taking? BD Pen Needle Adina U/F 32G X 4 MM MISC  Spouse/Significant Other No No   Sig: USE TO INJECT INSULIN 4 TIMES DAILY   Blood Glucose Monitoring Suppl (True Metrix Meter) w/Device KIT  Spouse/Significant Other No No   Sig: Use to test blood sugars 3 times daily   Blood Pressure Monitoring (BLOOD PRESSURE CUFF) MISC  Spouse/Significant Other No No   Sig: Use to check blood pressure before taking blood pressure medication and 1 hour after and follow instructions provided in discharge instructions based on the readings     Cholecalciferol (Vitamin D3) 1 25 MG (39985 UT) CAPS  Spouse/Significant Other No No   Sig: TAKE 1 CAPSULE BY MOUTH ONE TIME PER WEEK   Continuous Blood Gluc  (DEXCOM G6 ) LANCE  Spouse/Significant Other No No   Sig: Use as directed for continuous glucose monitoring   Continuous Blood Gluc Sensor (DEXCOM G6 SENSOR) MISC  Spouse/Significant Other No No   Sig: Use as directed for continuous glucose monitoring  Change every 10 days   Continuous Blood Gluc Transmit (DEXCOM G6 TRANSMITTER) MISC  Spouse/Significant Other No No   Sig: Use as directed for continuous glucose monitoring-Change every 3 months   Incontinence Supplies (MALE URINAL) MISC  Spouse/Significant Other No No   Sig: by Does not apply route daily   Insulin Syringe-Needle U-100 (B-D INS SYRINGE 0 5CC/30GX1/2") 30G X 1/2" 0 5 ML MISC  Spouse/Significant Other No No   Sig: Inject under the skin 4 (four) times a day   Lancets Ultra Thin 30G MISC  Spouse/Significant Other No No   Sig: Use 3 times a day   Lantus 100 UNIT/ML subcutaneous injection   No No   Sig: INJECT 14 UNITS UNDER THE SKIN DAILY   Nutritional Supplements (VITAMIN D BOOSTER PO)  Spouse/Significant Other Yes No   Sig: Take 0 5 mcg by mouth     ONETOUCH DELICA LANCETS 85C MISC  Spouse/Significant Other No No   Sig: by Does not apply route 3 (three) times a day   TORSEMIDE PO  Spouse/Significant Other Yes No   Sig: Take 50 mg by mouth Pt takes 50 mg daily on non- dialysis days: sat, sun, Tues, and thrusday   Pt takes 10 mg of torsemide daily on dialysis days m- w- f     aluminum-magnesium hydroxide-simethicone (MYLANTA) 200-200-20 mg/5 mL suspension   No No   Sig: Take 30 mL by mouth every 4 (four) hours as needed for indigestion or heartburn   aspirin (ECOTRIN LOW STRENGTH) 81 mg EC tablet  Spouse/Significant Other Yes No   Sig: Take 81 mg by mouth daily Resume on 8/14     atorvastatin (LIPITOR) 40 mg tablet  Spouse/Significant Other No No   Sig: Take 1 tablet (40 mg total) by mouth daily with dinner   calcium acetate (CALPHRON) 667 mg  Spouse/Significant Other Yes No   Sig: TAKE 2 TABLETS BY MOUTH THREE TIMES DAILY WITH MEALS   calcium carbonate (TUMS) 500 mg chewable tablet   No No   Sig: Chew 1 tablet (500 mg total) daily as needed for indigestion or heartburn   carvedilol (COREG) 6 25 mg tablet  Spouse/Significant Other No No   Sig: Take 1 tablet (6 25 mg total) by mouth 2 (two) times a day with meals Or as directed by your kidney doctor   doxercalciferol (HECTOROL) 0 5 mcg capsule  Spouse/Significant Other Yes No   Si mcg   glucose blood (True Metrix Blood Glucose Test) test strip   No No   Sig: Use 1 each 3 (three) times a day Use as instructed   insulin lispro (HumaLOG KwikPen) 100 units/mL injection pen  Spouse/Significant Other No No   Sig: INJECT 4 UNITS 3 TIMES A DAY WITH MEALS PLUS SCALE (max daily dose 42 units)   meclizine (ANTIVERT) 25 mg tablet  Spouse/Significant Other No No   Sig: Take 1 tablet (25 mg total) by mouth every 8 (eight) hours as needed for dizziness or nausea      Facility-Administered Medications: None       Past Medical History:   Diagnosis Date    Cerebrovascular accident (CVA) due to thrombosis of left middle cerebral artery ([facility] ) 2018    Chronic kidney disease     Diabetes mellitus [facility]     GERD (gastroesophageal reflux disease)     Hypercholesteremia     Hyperlipidemia     Hypertension     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    Neuropathy     Obesity     Osteomyelitis ([facility] )     last assessed 16    PVC's (premature ventricular contractions)     sees cardiology Dr Shaina camargo    Stroke [facility])     last weeof 2018 [facility]    TIA (transient ischemic attack) 10/28/2018       Past Surgical History:   Procedure Laterality Date    ABDOMINAL SURGERY      CHOLECYSTECTOMY      Percutaneous    COLONOSCOPY      CYSTOSCOPY      OTHER SURGICAL HISTORY      "stimulator to control bowel movements"    CO ESOPHAGOGASTRODUODENOSCOPY TRANSORAL DIAGNOSTIC N/A 2016    Procedure: ESOPHAGOGASTRODUODENOSCOPY (EGD); Surgeon: Elio Cramer MD;  [facility];   Service: Gastroenterology    CO LAP,CHOLECYSTECTOMY N/A 2016    Procedure: LAPAROSCOPIC CHOLECYSTECTOMY ;  Surgeon: Fermin Willis DO;  [facility];  Service: General    ROTATOR CUFF REPAIR Right     TOE AMPUTATION Right 10/28/2016    Procedure: 3RD TOE AMPUTATION ;  Surgeon: Rajat Mandujano DPM;  [facility];  Service:        Family History   Problem Relation Age of Onset    Leukemia Mother     Liver disease Mother     Lung cancer Mother         heavy smoker - 3 ppd    Heart disease Father     Liver disease Father     Multiple myeloma Sister     Breast cancer Sister     Urolithiasis Family     Alcohol abuse Neg Hx     Depression Neg Hx     Drug abuse Neg Hx     Substance Abuse Neg Hx     Mental illness Neg Hx      I have reviewed and agree with the history as documented  E-Cigarette/Vaping    E-Cigarette Use Never User      E-Cigarette/Vaping Substances    Nicotine No     THC No     CBD No     Flavoring No     Other No     Unknown No      Social History     Tobacco Use    Smoking status: Never Smoker    Smokeless tobacco: Never Used   Vaping Use    Vaping Use: Never used   Substance Use Topics    Alcohol use: Not Currently    Drug use: No       Review of Systems   Constitutional: Negative for chills and fever  HENT: Negative for congestion  Eyes: Negative for visual disturbance  Respiratory: Negative for cough and shortness of breath  Cardiovascular: Positive for chest pain  Negative for palpitations and leg swelling  Gastrointestinal: Negative for abdominal pain, diarrhea, nausea and vomiting  Genitourinary: Negative for dysuria and frequency  Musculoskeletal: Negative for arthralgias, back pain, neck pain and neck stiffness  Skin: Negative for rash  Neurological: Positive for dizziness and headaches  Negative for syncope, facial asymmetry, speech difficulty, weakness, light-headedness and numbness  Psychiatric/Behavioral: Negative for agitation, behavioral problems and confusion  Physical Exam  Physical Exam  Constitutional:       General: He is not in acute distress  Appearance: He is well-developed  He is not diaphoretic     HENT: Head: Normocephalic and atraumatic  Right Ear: External ear normal       Left Ear: External ear normal       Nose: Nose normal    Eyes:      Extraocular Movements: Extraocular movements intact  Conjunctiva/sclera: Conjunctivae normal       Pupils: Pupils are equal, round, and reactive to light  Cardiovascular:      Rate and Rhythm: Normal rate and regular rhythm  Pulses: Normal pulses  Heart sounds: Normal heart sounds  No murmur heard  No friction rub  No gallop  Pulmonary:      Effort: Pulmonary effort is normal  No respiratory distress  Breath sounds: Normal breath sounds  No wheezing or rales  Abdominal:      General: Bowel sounds are normal  There is no distension  Palpations: Abdomen is soft  Tenderness: There is no abdominal tenderness  There is no guarding  Musculoskeletal:         General: No deformity  Normal range of motion  Cervical back: Normal range of motion and neck supple  Right lower leg: No edema  Left lower leg: No edema  Skin:     General: Skin is warm and dry  Neurological:      Mental Status: He is alert and oriented to person, place, and time  Motor: No abnormal muscle tone  Comments: Face symmetric, tongue midline  5/5 strength in the proximal and distal bilateral upper and lower extremities with intact sensation to light touch  Normal finger-to-nose, rapid alternating movements, and heel-to-shin bilaterally  Gait is not ataxic but is tentative due to feeling unsteady with walking  Normal speech           Vital Signs  ED Triage Vitals   Temperature Pulse Respirations Blood Pressure SpO2   04/29/22 1017 04/29/22 1017 04/29/22 1017 04/29/22 1017 04/29/22 1017   98 °F (36 7 °C) 79 20 (!) 223/93 97 %      Temp Source Heart Rate Source Patient Position - Orthostatic VS BP Location FiO2 (%)   04/29/22 1017 04/29/22 1017 04/29/22 1017 04/29/22 1017 --   Oral Monitor Lying Right arm       Pain Score       04/29/22 1045 5           Vitals:    04/29/22 1715 04/29/22 1730 04/29/22 1745 04/29/22 1818   BP: 122/58 96/52 134/59 147/68   Pulse: 82 80 80 78   Patient Position - Orthostatic VS:   Lying Sitting         Visual Acuity  Visual Acuity      Most Recent Value   L Pupil Size (mm) 3   R Pupil Size (mm) 3          ED Medications  Medications   doxercalciferol (HECTOROL) injection 7 mcg (7 mcg Intravenous Given 4/29/22 1725)   labetalol (NORMODYNE) injection 10 mg (10 mg Intravenous Given 4/29/22 1427)   carvedilol (COREG) tablet 6 25 mg (has no administration in time range)   calcium acetate (PHOSLO) capsule 1,334 mg (has no administration in time range)   calcium carbonate (TUMS) chewable tablet 500 mg (has no administration in time range)   cholecalciferol (VITAMIN D3) tablet 1,000 Units (has no administration in time range)   insulin lispro (HumaLOG) 100 units/mL subcutaneous injection 4 Units (has no administration in time range)   insulin glargine (LANTUS) subcutaneous injection 14 Units 0 14 mL (has no administration in time range)   meclizine (ANTIVERT) tablet 25 mg (has no administration in time range)   torsemide (DEMADEX) tablet 50 mg (has no administration in time range)   torsemide (DEMADEX) tablet 10 mg (has no administration in time range)   acetaminophen (TYLENOL) tablet 650 mg (has no administration in time range)   atorvastatin (LIPITOR) tablet 40 mg (has no administration in time range)   aspirin tablet 325 mg (has no administration in time range)   heparin (porcine) subcutaneous injection 5,000 Units (has no administration in time range)   insulin lispro (HumaLOG) 100 units/mL subcutaneous injection 1-6 Units (has no administration in time range)   nitroglycerin (NITROSTAT) SL tablet 0 4 mg (has no administration in time range)   iohexol (OMNIPAQUE) 350 MG/ML injection (SINGLE-DOSE) 85 mL (85 mL Intravenous Given 4/29/22 1051)   hydrALAZINE (APRESOLINE) injection 10 mg (5 mg Intravenous Given 4/29/22 1216) Diagnostic Studies  Results Reviewed     Procedure Component Value Units Date/Time    Platelet count [[id number]]     Lab Status: No result Specimen: Blood     HS Troponin I 4hr [[id number]]  (Abnormal) Collected: 04/29/22 1449    Lab Status: Final result Specimen: Blood from Hand, Right Updated: 04/29/22 1528     hs TnI 4hr 68 ng/L      Delta 4hr hsTnI -2 ng/L     HS Troponin I 2hr [[id number]]  (Abnormal) Collected: 04/29/22 1216    Lab Status: Final result Specimen: Blood from Hand, Right Updated: 04/29/22 1253     hs TnI 2hr 61 ng/L      Delta 2hr hsTnI -9 ng/L     COVID/FLU/RSV - 2 hour TAT [[id number]]  (Normal) Collected: 04/29/22 1105    Lab Status: Final result Specimen: Nares from Nose Updated: 04/29/22 1227     SARS-CoV-2 Negative     INFLUENZA A PCR Negative     INFLUENZA B PCR Negative     RSV PCR Negative    Narrative:      FOR PEDIATRIC PATIENTS - copy/paste COVID Guidelines URL to browser: [email or web address]  [email or web address]    SARS-CoV-2 assay is a Nucleic Acid Amplification assay intended for the  qualitative detection of nucleic acid from SARS-CoV-2 in nasopharyngeal  swabs  Results are for the presumptive identification of SARS-CoV-2 RNA  Positive results are indicative of infection with SARS-CoV-2, the virus  causing COVID-19, but do not rule out bacterial infection or co-infection  with other viruses  Laboratories within the [place] and its  territories are required to report all positive results to the appropriate  public health authorities  Negative results do not preclude SARS-CoV-2  infection and should not be used as the sole basis for treatment or other  patient management decisions  Negative results must be combined with  clinical observations, patient history, and epidemiological information  This test has not been FDA cleared or approved  This test has been authorized by FDA under an Emergency Use Authorization  (EUA)   This test is only authorized for the duration of time the  declaration that circumstances exist justifying the authorization of the  emergency use of an in vitro diagnostic tests for detection of SARS-CoV-2  virus and/or diagnosis of COVID-19 infection under section 564(b)(1) of  the Act, 21 U  S C  271IJT-1(W)(8), unless the authorization is terminated  or revoked sooner  The test has been validated but independent review by FDA  and CLIA is pending  Test performed using [employer] GeneXpert: This RT-PCR assay targets N2,  a region unique to SARS-CoV-2  A conserved region in the E-gene was chosen  for pan-Sarbecovirus detection which includes SARS-CoV-2      HS Troponin 0hr (reflex protocol) [[id number]]  (Abnormal) Collected: 04/29/22 1015    Lab Status: Final result Specimen: Blood from Arm, Right Updated: 04/29/22 1149     hs TnI 0hr 70 ng/L     Basic metabolic panel [[id number]]  (Abnormal) Collected: 04/29/22 1015    Lab Status: Final result Specimen: Blood from Arm, Right Updated: 04/29/22 1141     Sodium 141 mmol/L      Potassium 4 9 mmol/L      Chloride 103 mmol/L      CO2 25 mmol/L      ANION GAP 13 mmol/L      BUN 62 mg/dL      Creatinine 8 06 mg/dL      Glucose 130 mg/dL      Calcium 9 1 mg/dL      eGFR 6 ml/min/1 73sq m     Narrative:      Romel guidelines for Chronic Kidney Disease (CKD):     Stage 1 with normal or high GFR (GFR > 90 mL/min/1 73 square meters)    Stage 2 Mild CKD (GFR = 60-89 mL/min/1 73 square meters)    Stage 3A Moderate CKD (GFR = 45-59 mL/min/1 73 square meters)    Stage 3B Moderate CKD (GFR = 30-44 mL/min/1 73 square meters)    Stage 4 Severe CKD (GFR = 15-29 mL/min/1 73 square meters)    Stage 5 End Stage CKD (GFR <15 mL/min/1 73 square meters)  Note: GFR calculation is accurate only with a steady state creatinine    Protime-INR [[id number]]  (Normal) Collected: 04/29/22 1015    Lab Status: Final result Specimen: Blood from Arm, Right Updated: 04/29/22 1134 Protime 13 3 seconds      INR 1 01    APTT [[id number]]  (Normal) Collected: 04/29/22 1015    Lab Status: Final result Specimen: Blood from Arm, Right Updated: 04/29/22 1138     PTT 32 seconds     CBC and Platelet [[id number]]  (Abnormal) Collected: 04/29/22 1015    Lab Status: Final result Specimen: Blood from Arm, Right Updated: 04/29/22 1117     WBC 6 19 Thousand/uL      RBC 3 64 Million/uL      Hemoglobin 11 3 g/dL      Hematocrit 35 7 %      MCV 98 fL      MCH 31 0 pg      MCHC 31 7 g/dL      RDW 14 3 %      Platelets 577 Thousands/uL      MPV 10 6 fL     Fingerstick Glucose (POCT) [[id number]]  (Abnormal) Collected: 04/29/22 1036    Lab Status: Final result Updated: 04/29/22 1037     POC Glucose 144 mg/dl                  XR chest 1 view   Final Result by Valeria Damico MD (04/29 1130)      Development of cardiomegaly and mild vascular congestion                  Workstation performed: [id number]         CTA stroke alert (head/neck)   Final Result by Ruth Varela MD (04/29 1113)      1  No significant interval change compared to CTA 4/19/2022       2   Stable severe stenosis at distal petrous and proximal cavernous segments of right ICA  3   Stable high-grade stenosis of bilateral vertebral artery origins  High-grade atherosclerotic stenosis in the mid intradural left vertebral artery  Moderate atherosclerotic disease of right intradural vertebral artery  4   Atherosclerotic change of cervical carotid arteries (right greater than left) without hemodynamically significant stenosis (less than 50%)  5   Extensive atherosclerotic disease of bilateral external carotid arteries and branches  I personally discussed this study with Dr Sarah Al on 4/29/2022 at 10:59 AM                             Workstation performed: [id number]         CT stroke alert brain   Final Result by Ruth Varela MD (04/29 1116)      No acute intracranial CT abnormality    Stable sequela of old infarcts as described  I personally discussed this study with Dr Iliana Bello on 4/29/2022 at 10:59 AM          Workstation performed: [id number]         MRI inpatient order    (Results Pending)              Procedures  CriticalCare Time  Performed by: Margarette Priest MD  Authorized by: Margarette Priest MD     Critical care provider statement:     Critical care time (minutes):  35    Critical care time was exclusive of:  Separately billable procedures and treating other patients and teaching time    Critical care was necessary to treat or prevent imminent or life-threatening deterioration of the following conditions: stroke alert with consideration of TPA  Critical care was time spent personally by me on the following activities:  Obtaining history from patient or surrogate, development of treatment plan with patient or surrogate, discussions with consultants, evaluation of patient's response to treatment, examination of patient, review of old charts, re-evaluation of patient's condition, ordering and review of radiographic studies, ordering and review of laboratory studies and ordering and performing treatments and interventions    I assumed direction of critical care for this patient from another provider in my specialty: no               ED Course  ED Course as of 04/29/22 1831   Fri Apr 29, 2022   1042 Patient reports onset of severe headache at 8:30 p m  While watching TV  Headache has gotten worse since the time of onset  Denies ever experiencing headache like this before  Accompanied by persistent sensation of movement that has not waxed and waned since the onset of symptoms  He is able to ambulate but required holding his wife's arm due to feeling unsteady on his feet due to persistent dizziness  NIH stroke scale 0  Stroke alert activated due to potential concern for posterior circulation stroke      1046 Patient initially described his headache as sudden in onset however on further questioning states that it started doctor gradually worse  Headache started 2 hours ago so if subarachnoid hemorrhage is present it should show up on non contrasted CT scan of the head  2015 [facility] Neurology team at bedside  1047 I personally interpreted the patient's EKG which reveals normal rate, normal sinus rhythm, right bundle-branch block unchanged from prior, otherwise normal intervals, nonspecific ST abnormality in the inferior leads similar to prior with new upsloping ST segments in leads V2 and V3 compared to prior without tanika ST segment elevation  Will obtain serial EKGs and troponins  1125 Repeat EKG obtained which shows normal rate, normal sinus rhythm, right axis deviation unchanged from prior, resolution of upsloping ST segments remains V2 and V3 seen on prior EKG, redemonstration of previously seen mild ST depression with nonspecific T-wave abnormality inferiorly  1200 [facility] with Dr Rinaldi Nicely with neurology  Suspects hypertensive emergency rather than new stroke  Recommends keeping BP between 972 and 473 systolic  Recommends admission to stroke pathway either for MRI or for repeat CT scan tomorrow  1209 Patient re-evaluated  Blood pressure 180/79  Will give 5 mg of IV hydralazine  Headache is reportedly improved  Will also reach out to Nephrology to coordinate dialysis when he is in the hospital    1219 Troponin 70 from 55 10 days ago  Will continue to trend  Patient reports improvement in his headache and is asking for food  Will admit to Medicine under stroke pathway per Neurology recommendations                    Stroke Assessment     Row Name 04/29/22 1035             NIH Stroke Scale    Interval Baseline      Level of Consciousness (1a ) 0      LOC Questions (1b ) 0      LOC Commands (1c ) 0      Best Gaze (2 ) 0      Visual (3 ) 0      Facial Palsy (4 ) 0      Motor Arm, Left (5a ) 0      Motor Arm, Right (5b ) 0      Motor Leg, Left (6a ) 0      Motor Leg, Right (6b ) 0      Limb Ataxia (7 ) 0 Sensory (8 ) 0      Best Language (9 ) 0      Dysarthria (10 ) 0      Extinction and Inattention (11 ) (Formerly Neglect) 0      Total 0                Most Recent Value   TPA Decision Options    TPA Decision Patient not a TPA candidate  Patient is not a candidate options Symptoms resolved/clearly non disabling  SBIRT 22yo+      Most Recent Value   SBIRT (22 yo +)    In order to provide better care to our patients, we are screening all of our patients for alcohol and drug use  Would it be okay to ask you these screening questions? Yes Filed at: 04/29/2022 1534   Initial Alcohol Screen: US AUDIT-C     1  How often do you have a drink containing alcohol? 0 Filed at: 04/29/2022 1534   2  How many drinks containing alcohol do you have on a typical day you are drinking? 0 Filed at: 04/29/2022 1534   3a  Male UNDER 65: How often do you have five or more drinks on one occasion? 0 Filed at: 04/29/2022 1534   Audit-C Score 0 Filed at: 04/29/2022 1534   XIN: How many times in the past year have you    Used an illegal drug or used a prescription medication for non-medical reasons? Never Filed at: 04/29/2022 1534                    MDM  Number of Diagnoses or Management Options  Dizziness: new and requires workup  Headache: new and requires workup  Hypertensive crisis: new and requires workup  Diagnosis management comments: Please see ED course above for details of the Medical Decision Making            Amount and/or Complexity of Data Reviewed  Clinical lab tests: ordered and reviewed  Tests in the radiology section of CPT®: ordered and reviewed  Obtain history from someone other than the patient: yes  Review and summarize past medical records: yes  Discuss the patient with other providers: yes  Independent visualization of images, tracings, or specimens: yes    Patient Progress  Patient progress: improved      Disposition  Final diagnoses:   Dizziness   Hypertensive crisis   Headache     Time reflects when diagnosis was documented in both MDM as applicable and the Disposition within this note     Time User Action Codes Description Comment    4/29/2022 10:38 AM Conchetta Askew Add [R42] Dizziness     4/29/2022 12:34 PM Valeen Smiling [I16 9] Hypertensive crisis     4/29/2022 12:34 PM Conchetta Askew Add [R51 9] Headache     4/29/2022  5:42 PM Rojelio Friedman Add [I63 9] Stroke ([facility] [address] )     4/29/2022  5:42 PM Rojelio Cooper Add [R77 8] Elevated troponin       ED Disposition     ED Disposition Condition Date/Time Comment    Admit Stable Fri Apr 29, 2022 12:34 PM Case was discussed with MICKEY and the patient's admission status was agreed to be Admission Status: inpatient status to the service of Dr Bora Hawley  Follow-up Information    None         Current Discharge Medication List      CONTINUE these medications which have NOT CHANGED    Details   aluminum-magnesium hydroxide-simethicone (MYLANTA) 200-200-20 mg/5 mL suspension Take 30 mL by mouth every 4 (four) hours as needed for indigestion or heartburn  Qty: 355 mL, Refills: 0    Associated Diagnoses: Gastroesophageal reflux disease without esophagitis      aspirin (ECOTRIN LOW STRENGTH) 81 mg EC tablet Take 81 mg by mouth daily Resume on 8/14        atorvastatin (LIPITOR) 40 mg tablet Take 1 tablet (40 mg total) by mouth daily with dinner  Qty: 90 tablet, Refills: 1    Associated Diagnoses: Mixed hyperlipidemia      BD Pen Needle Adina U/F 32G X 4 MM MISC USE TO INJECT INSULIN 4 TIMES DAILY  Qty: 400 each, Refills: 1    Comments: DX Code Needed      Associated Diagnoses: Type 2 diabetes mellitus with hyperglycemia, with long-term current use of insulin ([facility])      Blood Glucose Monitoring Suppl (True Metrix Meter) w/Device KIT Use to test blood sugars 3 times daily  Qty: 1 kit, Refills: 0    Comments: Dx: E11 65  Associated Diagnoses: Type 2 diabetes mellitus with hyperglycemia, with long-term current use of insulin ([facility] )      Blood Pressure Monitoring (BLOOD PRESSURE CUFF) MISC Use to check blood pressure before taking blood pressure medication and 1 hour after and follow instructions provided in discharge instructions based on the readings  Qty: 1 each, Refills: 0    Associated Diagnoses: Essential hypertension      calcium acetate (CALPHRON) 667 mg TAKE 2 TABLETS BY MOUTH THREE TIMES DAILY WITH MEALS      calcium carbonate (TUMS) 500 mg chewable tablet Chew 1 tablet (500 mg total) daily as needed for indigestion or heartburn  Refills: 0    Associated Diagnoses: Gastroesophageal reflux disease without esophagitis      carvedilol (COREG) 6 25 mg tablet Take 1 tablet (6 25 mg total) by mouth 2 (two) times a day with meals Or as directed by your kidney doctor  Qty: 180 tablet, Refills: 1    Associated Diagnoses: Essential hypertension      Cholecalciferol (Vitamin D3) 1 25 MG (75433 UT) CAPS TAKE 1 CAPSULE BY MOUTH ONE TIME PER WEEK  Qty: 12 capsule, Refills: 2    Associated Diagnoses: Vitamin D deficiency      Continuous Blood Gluc  (DEXCOM G6 ) LANCE Use as directed for continuous glucose monitoring  Qty: 1 Device, Refills: 0    Associated Diagnoses: Type 2 diabetes mellitus with hyperglycemia, with long-term current use of insulin ([facility])      Continuous Blood Gluc Sensor (DEXCOM G6 SENSOR) MISC Use as directed for continuous glucose monitoring   Change every 10 days  Qty: 1 each, Refills: 11    Comments: Please dispense 1-3 pack of sensors 11R  Associated Diagnoses: Type 2 diabetes mellitus with hyperglycemia, with long-term current use of insulin (HCC)      Continuous Blood Gluc Transmit (DEXCOM G6 TRANSMITTER) MISC Use as directed for continuous glucose monitoring-Change every 3 months  Qty: 1 each, Refills: 3    Associated Diagnoses: Type 2 diabetes mellitus with hyperglycemia, with long-term current use of insulin ([facility])      doxercalciferol (HECTOROL) 0 5 mcg capsule 4 mcg      glucose blood (True Metrix Blood Glucose Test) test strip Use 1 each 3 (three) times a day Use as instructed  Qty: 100 each, Refills: 0    Comments: PT NEEDS TO KEEP OV FOR FURTHER REFILLS  Associated Diagnoses: Type 2 diabetes mellitus with hyperglycemia, with long-term current use of insulin ([facility])      Incontinence Supplies (MALE URINAL) MISC by Does not apply route daily  Qty: 6 each, Refills: 3    Associated Diagnoses: History of ischemic cerebrovascular accident (CVA) with residual deficit; Diabetic polyneuropathy associated with type 2 diabetes mellitus ([facility] ); Urine troubles      insulin lispro (HumaLOG KwikPen) 100 units/mL injection pen INJECT 4 UNITS 3 TIMES A DAY WITH MEALS PLUS SCALE (max daily dose 42 units)  Qty: 45 mL, Refills: 1    Comments: DX Code Needed    Associated Diagnoses: Type 2 diabetes mellitus with hyperglycemia, with long-term current use of insulin ([facility])      Insulin Syringe-Needle U-100 (B-D INS SYRINGE 0 5CC/30GX1/2") 30G X 1/2" 0 5 ML MISC Inject under the skin 4 (four) times a day  Qty: 360 each, Refills: 1    Associated Diagnoses: Type 2 diabetes mellitus with hyperglycemia, unspecified whether long term insulin use ([facility] )      ! ! Lancets Ultra Thin 30G MISC Use 3 times a day  Qty: 300 each, Refills: 0    Associated Diagnoses: Type 2 diabetes mellitus with chronic kidney disease on chronic dialysis, with long-term current use of insulin ([facility])      Lantus 100 UNIT/ML subcutaneous injection INJECT 14 UNITS UNDER THE SKIN DAILY  Qty: 10 mL, Refills: 0    Associated Diagnoses: Type 2 diabetes mellitus with hyperglycemia, with long-term current use of insulin ([facility])      meclizine (ANTIVERT) 25 mg tablet Take 1 tablet (25 mg total) by mouth every 8 (eight) hours as needed for dizziness or nausea  Qty: 30 tablet, Refills: 0    Associated Diagnoses: Dizziness      Nutritional Supplements (VITAMIN D BOOSTER PO) Take 0 5 mcg by mouth        !! ONETOUCH DELICA LANCETS 66E MISC by Does not apply route 3 (three) times a day  Qty: 270 each, Refills: 1 Associated Diagnoses: Type 2 diabetes mellitus with hypoglycemia without coma, with long-term current use of insulin (HCC)      TORSEMIDE PO Take 50 mg by mouth Pt takes 50 mg daily on non- dialysis days: sat, sun, Tues, and thrusday  Pt takes 10 mg of torsemide daily on dialysis days m- w- f         !! - Potential duplicate medications found  Please discuss with provider  No discharge procedures on file      PDMP Review       Value Time User    PDMP Reviewed  Yes 12/30/2020 10:40 PM Yduelka Talavera MD          ED Provider  Electronically Signed by           Loan Payne MD  04/29/22 8219

## 2022-04-29 NOTE — TELEPHONE ENCOUNTER
Post CVA Discharge Follow Up    Hospitalization: 4/19/2022 - 4/21/2022  The purpose of this phone call is to assess patient's general wellbeing or for any assistance needed with follow-up care  Called, reached wife Marlene Ha (she is on consent form), I introduced myself and explained neurovascular nurse navigator role  She reports that they are on their way back to the hospital as we speak  She reports patient is having severe headache and they are concerned  I let wife know that I do not want to delay their commute to the hospital and that I will call after discharge to follow back up and schedule

## 2022-04-30 ENCOUNTER — APPOINTMENT (INPATIENT)
Dept: CT IMAGING | Facility: HOSPITAL | Age: 62
DRG: 304 | End: 2022-04-30
Payer: MEDICARE

## 2022-04-30 VITALS
WEIGHT: 236.11 LBS | TEMPERATURE: 98.3 F | SYSTOLIC BLOOD PRESSURE: 143 MMHG | HEART RATE: 72 BPM | HEIGHT: 69 IN | BODY MASS INDEX: 34.97 KG/M2 | DIASTOLIC BLOOD PRESSURE: 74 MMHG | OXYGEN SATURATION: 96 % | RESPIRATION RATE: 20 BRPM

## 2022-04-30 PROBLEM — R51.9 HEADACHE: Status: RESOLVED | Noted: 2022-04-29 | Resolved: 2022-04-30

## 2022-04-30 PROBLEM — I16.1 HYPERTENSIVE EMERGENCY: Status: ACTIVE | Noted: 2022-04-30

## 2022-04-30 LAB
ANION GAP SERPL CALCULATED.3IONS-SCNC: 16 MMOL/L (ref 4–13)
ATRIAL RATE: 78 BPM
ATRIAL RATE: 83 BPM
ATRIAL RATE: 84 BPM
BASOPHILS # BLD AUTO: 0.03 THOUSANDS/ΜL (ref 0–0.1)
BASOPHILS NFR BLD AUTO: 0 % (ref 0–1)
BUN SERPL-MCNC: 52 MG/DL (ref 5–25)
CALCIUM SERPL-MCNC: 8.7 MG/DL (ref 8.3–10.1)
CHLORIDE SERPL-SCNC: 98 MMOL/L (ref 100–108)
CHOLEST SERPL-MCNC: 84 MG/DL
CO2 SERPL-SCNC: 17 MMOL/L (ref 21–32)
CREAT SERPL-MCNC: 7.24 MG/DL (ref 0.6–1.3)
EOSINOPHIL # BLD AUTO: 0.16 THOUSAND/ΜL (ref 0–0.61)
EOSINOPHIL NFR BLD AUTO: 2 % (ref 0–6)
ERYTHROCYTE [DISTWIDTH] IN BLOOD BY AUTOMATED COUNT: 14.5 % (ref 11.6–15.1)
EST. AVERAGE GLUCOSE BLD GHB EST-MCNC: 126 MG/DL
GFR SERPL CREATININE-BSD FRML MDRD: 7 ML/MIN/1.73SQ M
GLUCOSE SERPL-MCNC: 103 MG/DL (ref 65–140)
GLUCOSE SERPL-MCNC: 135 MG/DL (ref 65–140)
GLUCOSE SERPL-MCNC: 145 MG/DL (ref 65–140)
HBA1C MFR BLD: 6 %
HCT VFR BLD AUTO: 34.5 % (ref 36.5–49.3)
HDLC SERPL-MCNC: 34 MG/DL
HGB BLD-MCNC: 11.1 G/DL (ref 12–17)
IMM GRANULOCYTES # BLD AUTO: 0.06 THOUSAND/UL (ref 0–0.2)
IMM GRANULOCYTES NFR BLD AUTO: 1 % (ref 0–2)
LDLC SERPL CALC-MCNC: 25 MG/DL (ref 0–100)
LYMPHOCYTES # BLD AUTO: 1.36 THOUSANDS/ΜL (ref 0.6–4.47)
LYMPHOCYTES NFR BLD AUTO: 20 % (ref 14–44)
MCH RBC QN AUTO: 30.7 PG (ref 26.8–34.3)
MCHC RBC AUTO-ENTMCNC: 32.2 G/DL (ref 31.4–37.4)
MCV RBC AUTO: 96 FL (ref 82–98)
MONOCYTES # BLD AUTO: 0.51 THOUSAND/ΜL (ref 0.17–1.22)
MONOCYTES NFR BLD AUTO: 8 % (ref 4–12)
NEUTROPHILS # BLD AUTO: 4.6 THOUSANDS/ΜL (ref 1.85–7.62)
NEUTS SEG NFR BLD AUTO: 69 % (ref 43–75)
NRBC BLD AUTO-RTO: 0 /100 WBCS
P AXIS: 45 DEGREES
P AXIS: 70 DEGREES
P AXIS: 80 DEGREES
PLATELET # BLD AUTO: 146 THOUSANDS/UL (ref 149–390)
PMV BLD AUTO: 10 FL (ref 8.9–12.7)
POTASSIUM SERPL-SCNC: 5.7 MMOL/L (ref 3.5–5.3)
PR INTERVAL: 156 MS
PR INTERVAL: 166 MS
PR INTERVAL: 180 MS
QRS AXIS: 2 DEGREES
QRS AXIS: 32 DEGREES
QRS AXIS: 61 DEGREES
QRSD INTERVAL: 154 MS
QRSD INTERVAL: 158 MS
QRSD INTERVAL: 158 MS
QT INTERVAL: 384 MS
QT INTERVAL: 410 MS
QT INTERVAL: 414 MS
QTC INTERVAL: 446 MS
QTC INTERVAL: 458 MS
QTC INTERVAL: 478 MS
RBC # BLD AUTO: 3.61 MILLION/UL (ref 3.88–5.62)
SODIUM SERPL-SCNC: 131 MMOL/L (ref 136–145)
T WAVE AXIS: 19 DEGREES
T WAVE AXIS: 21 DEGREES
T WAVE AXIS: 27 DEGREES
TRIGL SERPL-MCNC: 125 MG/DL
VENTRICULAR RATE: 75 BPM
VENTRICULAR RATE: 80 BPM
VENTRICULAR RATE: 81 BPM
WBC # BLD AUTO: 6.72 THOUSAND/UL (ref 4.31–10.16)

## 2022-04-30 PROCEDURE — 93010 ELECTROCARDIOGRAM REPORT: CPT | Performed by: INTERNAL MEDICINE

## 2022-04-30 PROCEDURE — G1004 CDSM NDSC: HCPCS

## 2022-04-30 PROCEDURE — 80048 BASIC METABOLIC PNL TOTAL CA: CPT | Performed by: INTERNAL MEDICINE

## 2022-04-30 PROCEDURE — 97163 PT EVAL HIGH COMPLEX 45 MIN: CPT

## 2022-04-30 PROCEDURE — 93005 ELECTROCARDIOGRAM TRACING: CPT

## 2022-04-30 PROCEDURE — 99223 1ST HOSP IP/OBS HIGH 75: CPT | Performed by: INTERNAL MEDICINE

## 2022-04-30 PROCEDURE — 85025 COMPLETE CBC W/AUTO DIFF WBC: CPT | Performed by: INTERNAL MEDICINE

## 2022-04-30 PROCEDURE — 82948 REAGENT STRIP/BLOOD GLUCOSE: CPT

## 2022-04-30 PROCEDURE — 70450 CT HEAD/BRAIN W/O DYE: CPT

## 2022-04-30 PROCEDURE — 80061 LIPID PANEL: CPT | Performed by: INTERNAL MEDICINE

## 2022-04-30 PROCEDURE — 99239 HOSP IP/OBS DSCHRG MGMT >30: CPT | Performed by: INTERNAL MEDICINE

## 2022-04-30 PROCEDURE — 99233 SBSQ HOSP IP/OBS HIGH 50: CPT | Performed by: PSYCHIATRY & NEUROLOGY

## 2022-04-30 PROCEDURE — 83036 HEMOGLOBIN GLYCOSYLATED A1C: CPT | Performed by: INTERNAL MEDICINE

## 2022-04-30 PROCEDURE — 97165 OT EVAL LOW COMPLEX 30 MIN: CPT

## 2022-04-30 RX ORDER — ASPIRIN 81 MG/1
81 TABLET ORAL DAILY
Status: DISCONTINUED | OUTPATIENT
Start: 2022-04-30 | End: 2022-04-30 | Stop reason: HOSPADM

## 2022-04-30 RX ORDER — CARVEDILOL 12.5 MG/1
12.5 TABLET ORAL 2 TIMES DAILY WITH MEALS
Qty: 60 TABLET | Refills: 0 | Status: SHIPPED | OUTPATIENT
Start: 2022-04-30 | End: 2022-05-23

## 2022-04-30 RX ADMIN — TORSEMIDE 50 MG: 20 TABLET ORAL at 08:09

## 2022-04-30 RX ADMIN — ASPIRIN 81 MG: 81 TABLET, COATED ORAL at 08:07

## 2022-04-30 RX ADMIN — HEPARIN SODIUM 5000 UNITS: 5000 INJECTION INTRAVENOUS; SUBCUTANEOUS at 06:29

## 2022-04-30 RX ADMIN — INSULIN LISPRO 4 UNITS: 100 INJECTION, SOLUTION INTRAVENOUS; SUBCUTANEOUS at 08:12

## 2022-04-30 RX ADMIN — Medication 1000 UNITS: at 08:07

## 2022-04-30 RX ADMIN — CALCIUM ACETATE 1334 MG: 667 CAPSULE ORAL at 08:07

## 2022-04-30 RX ADMIN — CARVEDILOL 6.25 MG: 6.25 TABLET, FILM COATED ORAL at 08:07

## 2022-04-30 RX ADMIN — CALCIUM ACETATE 1334 MG: 667 CAPSULE ORAL at 11:38

## 2022-04-30 RX ADMIN — INSULIN LISPRO 4 UNITS: 100 INJECTION, SOLUTION INTRAVENOUS; SUBCUTANEOUS at 11:39

## 2022-04-30 RX ADMIN — HEPARIN SODIUM 5000 UNITS: 5000 INJECTION INTRAVENOUS; SUBCUTANEOUS at 14:08

## 2022-04-30 RX ADMIN — INSULIN GLARGINE 14 UNITS: 100 INJECTION, SOLUTION SUBCUTANEOUS at 08:07

## 2022-04-30 NOTE — ASSESSMENT & PLAN NOTE
· Patient's presentation suggestive for hypertensive emergency  · POA blood pressure was 223/93  · Patient blood pressure slowly got improved  · Patient's presenting symptoms improved with improving blood pressure  · Most likely given this circumstances, patient's symptoms were due to severely increased blood pressure    Continue patient's home medications  Carvedilol adjusted to 12 5 b i d

## 2022-04-30 NOTE — ASSESSMENT & PLAN NOTE
· HS trop 70>61>68  · EKG showed mild ST depression in inferior and lateral leads   · Chest pain subsided since BP improved  · Abnl recent stress test  Spoke with Pt's wife  She was told by cardiology that pt may need cardiac cath if no improvement  · He has been having intermittent CP  Last HD had to stop short due to CP during th etreatment  · Patient denies any pain this morning and said that he feels well  · Cardiology had a conversation with the patient in the patient did not want to do catheterization for now, and did not want to wait till Monday    · Per Cardiology, overall presentation is suggestive for helper tensive emergency, troponin elevation likely because of decreased clearance from end-stage renal disease    Plan:  · Patient will get discharged and will follow-up outpatient, per cardiology if his chest pain persists with controlled blood pressures he will need coronary angiography  · Cardiology increased carvedilol to 12 5 mg twice daily

## 2022-04-30 NOTE — ASSESSMENT & PLAN NOTE
Lab Results   Component Value Date    EGFR 7 04/30/2022    EGFR 6 04/29/2022    EGFR 6 04/20/2022    CREATININE 7 24 (H) 04/30/2022    CREATININE 8 06 (H) 04/29/2022    CREATININE 7 94 (H) 04/20/2022   HD M-W-F     Received hemodialysis in the hospital  Patient is going to continue following up with dialysis center

## 2022-04-30 NOTE — OCCUPATIONAL THERAPY NOTE
Occupational Therapy Evaluation     Patient Name: Paras Gar  KPBDE'K Date: 4/30/2022  Problem List  Principal Problem:    Dizziness  Active Problems:    Diabetic polyneuropathy associated with type 2 diabetes mellitus ([facility] )    ESRD on dialysis ([facility] )    Elevated troponin    Past Medical History  Past Medical History:   Diagnosis Date    Cerebrovascular accident (CVA) due to thrombosis of left middle cerebral artery ([facility] ) 7/29/2018    Chronic kidney disease     Diabetes mellitus ([facility] )     GERD (gastroesophageal reflux disease)     Hypercholesteremia     Hyperlipidemia     Hypertension     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    Neuropathy     Obesity     Osteomyelitis ([facility] )     last assessed 11/4/16    PVC's (premature ventricular contractions)     sees cardiology Dr Semaj camargo    Stroke [facility])     last weeof July 2018 [address] [facility]    TIA (transient ischemic attack) 10/28/2018     Past Surgical History  Past Surgical History:   Procedure Laterality Date    ABDOMINAL SURGERY      CHOLECYSTECTOMY      Percutaneous    COLONOSCOPY      CYSTOSCOPY      OTHER SURGICAL HISTORY      "stimulator to control bowel movements"    [place] ESOPHAGOGASTRODUODENOSCOPY TRANSORAL DIAGNOSTIC N/A 9/27/2016    Procedure: ESOPHAGOGASTRODUODENOSCOPY (EGD); Surgeon: Abhay Hector MD;  [facility]: [facility];   Service: Gastroenterology    [place] LAP,CHOLECYSTECTOMY N/A 2/29/2016    Procedure: LAPAROSCOPIC CHOLECYSTECTOMY ;  Surgeon: Bonnie Ferris DO;  Location: [facility];  Service: General    ROTATOR CUFF REPAIR Right     TOE AMPUTATION Right 10/28/2016    Procedure: 3RD TOE AMPUTATION ;  Surgeon: Jackelyn Boast, DPM;  [facility];  Service:              04/30/22 1336   Note Type   Note type Evaluation   Restrictions/Precautions   Weight Bearing Precautions Per Order No   Pain Assessment   Pain Assessment Tool 0-10   Pain Score No Pain   Home Living   Type of [address] Layout Two level  (bed and bath on 2nd level )   Bathroom Shower/Tub Tub/shower unit   Bathroom Toilet Standard   Additional Comments Patient states does not use AD   Prior Function   Level of [place] Independent with ADLs and functional mobility   Lives With Spouse   Receives Help From Family   ADL Assistance Independent   IADLs Needs assistance   Falls in the last 6 months 0   Comments Wife assists with ADL   Does not drive since stroke in 2018  recieves dialysis 3 times a week   ADL   Eating Assistance 7  [address] [facility]  Supervision/Setup   Toileting Assistance  6  Modified independent   Bed Mobility   Additional Comments Pt wasw received iin recliner   Transfers   Sit to Stand 6  Modified independent   Stand to Sit 6  Modified independent   Functional Mobility   Additional Comments Patient ambulated w/o AD at Mod I   Balance   Static Sitting Fair +   Dynamic Sitting Fair +   Static Standing Fair   Dynamic Standing Fair   Activity Tolerance   Activity Tolerance Patient tolerated treatment well   Nurse Made Aware RN   RUE Assessment   RUE Assessment WFL   LUE Assessment   LUE Assessment WFL   Hand Function   Gross Motor Coordination Functional   Fine Motor Coordination Functional   Sensation   Light Touch No apparent deficits   Vision-Basic Assessment   Current Vision No visual deficits   Vision - Complex Assessment   Acuity Able to read clock/calendar on wall without difficulty   Cognition   Orientation Level Oriented X4   Following Commands Follows all commands and directions without difficulty   Assessment   Assessment Patient evaluated by Occupational Therapy  Patient admitted with Elevated troponin    The patients occupational profile, medical and therapy history includes a expanded review of medical and/or therapy records and additional review of physical, cognitive, or psychosocial history related to current functional performance  Comorbidities affecting functional mobility and ADLS include: CVA, diabetes, hypertension, hyperlipidemia and obesity  Prior to admission, patient was independent with functional mobility without assistive device, independent with ADLS, requiring assist for IADLS and living with spouse in a 2 level home with 3 steps to enter  This evaluation requires clinical decision making of low complexity, because the patients presents with no comorbidities that affect occupational performance and required no modification of tasks or assistance with consideration of a limited number of treatment options  The Barthel Index was used as a functional outcome tool presenting with a score of 95, i The patient's raw score on the AM-PAC Daily Activity inpatient short form is 24, standardized score is 57, greater than 39 4  Patients at this level are likely to benefit from DC to home  Patient is currently functioning at baseline  No skilled Ot therapy is needed at this tome  Will DC from skilled OT     Goals   Patient Goals "go home"   Recommendation   OT Discharge Recommendation No rehabilitation needs   AM-PAC Daily Activity Inpatient   Lower Body Dressing 4   Bathing 4   Toileting 4   Upper Body Dressing 4   Grooming 4   Eating 4   Daily Activity Raw Score 24   Daily Activity Standardized Score (Calc for Raw Score >=11) 57 54   AM-PAC Applied Cognition Inpatient   Following a Speech/Presentation 4   Understanding Ordinary Conversation 4   Taking Medications 2   Remembering Where Things Are Placed or Put Away 3   Remembering List of 4-5 Errands 2   Taking Care of Complicated Tasks 2   Applied Cognition Raw Score 17   Applied Cognition Standardized Score 36 52   Barthel Index   Feeding 10   Bathing 5   Grooming Score 5   Dressing Score 10   Bladder Score 10   Bowels Score 10   Toilet Use Score 10   Transfers (Bed/Chair) Score 15   Mobility (Level Surface) Score 15   Stairs Score 5   Barthel Index Score 95   Nobie Europe, OT

## 2022-04-30 NOTE — ASSESSMENT & PLAN NOTE
Lab Results   Component Value Date    HGBA1C 6 0 (H) 04/19/2022       Recent Labs     04/29/22  1036 04/30/22  0756 04/30/22  1131   POCGLU 144* 135 145*       Blood Sugar Average: Last 72 hrs:  (P) 080 [id number]     Recent A1c 6 at goal   Continue home Lantus and humalog

## 2022-04-30 NOTE — ASSESSMENT & PLAN NOTE
· Acute onset vertigo with room spinning sensation, severe headache and elevated blood pressure this morning  · Multiple risk factors for CVA  · Hypertensive emergency vs R/O CVA  · CTA H/N showed extensive atherosclerotic changes including high-grade stenosis of bilateral vertebral arteries  No new acute findings  · Seen by Neurology  Was on stroke pathway  Per Neuro, MRI was not able to be completed because of spinal stimulator     · CT head was reordered on 04/30 and negative for new findings      · Continue ASA/Lipitor  · Neurology was consulted, recommendations appreciated  · Most likely current presentation is due to hypertensive episode  · Outpatient follow-up

## 2022-04-30 NOTE — DISCHARGE INSTR - AVS FIRST PAGE
Dear Edi Epperson,     It was our pleasure to care for you here at [facility], [facility]  It is our hope that we were always able to exceed the expected standards for your care during your stay  You were hospitalized due to headache, dizziness and chest pain  You were cared for on the 3rd floor by Milton Luevano MD under the service of Sheryle Montes, MD with the [facility] who covers for your primary care physician (PCP), Mario Francisco DO, while you were hospitalized  If you have any questions or concerns related to this hospitalization, you may contact us at [phone]  For follow up as well as any medication refills, we recommend that you follow up with your primary care physician  A registered nurse will reach out to you by phone within a few days after your discharge to answer any additional questions that you may have after going home  However, at this time we provide for you here, the most important instructions / recommendations at discharge:     Notable Medication Adjustments -   Increase carvedilol to 12 5 mg and take it 2 times daily  Continue usual other home medications  Testing Required after Discharge -   Further follow-ups with cardiology and neurology  You might need cardiac catheterization if you keep having chest pain in the setting of normal blood pressure  Important follow up information -   Please follow-up with Cardiology  Follow up with PCP  Please follow-up with Neurology  Please review this entire after visit summary as additional general instructions including medication list, appointments, activity, diet, any pertinent wound care, and other additional recommendations from your care team that may be provided for you        Sincerely,     Milton Luevano MD

## 2022-04-30 NOTE — DISCHARGE SUMMARY
[facility]  Discharge- Esther Tellez 1960, 58 y o  male MRN: [id number]  Unit/Bed#: S -01 Encounter: [id number]  Primary Care Provider: Melanie Soni DO   Date and time admitted to hospital: 4/29/2022 10:09 AM    Elevated troponin  Assessment & Plan  · HS trop 70>61>68  · EKG showed mild ST depression in inferior and lateral leads   · Chest pain subsided since BP improved  · Abnl recent stress test  Spoke with Pt's wife  She was told by cardiology that pt may need cardiac cath if no improvement  · He has been having intermittent CP  Last HD had to stop short due to CP during th etreatment  · Patient denies any pain this morning and said that he feels well  · Cardiology had a conversation with the patient in the patient did not want to do catheterization for now, and did not want to wait till Monday  · Per Cardiology, overall presentation is suggestive for helper tensive emergency, troponin elevation likely because of decreased clearance from end-stage renal disease    Plan:  · Patient will get discharged and will follow-up outpatient, per cardiology if his chest pain persists with controlled blood pressures he will need coronary angiography  · Cardiology increased carvedilol to 12 5 mg twice daily      * Dizziness  Assessment & Plan  · Acute onset vertigo with room spinning sensation, severe headache and elevated blood pressure this morning  · Multiple risk factors for CVA  · Hypertensive emergency vs R/O CVA  · CTA H/N showed extensive atherosclerotic changes including high-grade stenosis of bilateral vertebral arteries  No new acute findings  · Seen by Neurology  Was on stroke pathway  Per Neuro, MRI was not able to be completed because of spinal stimulator     · CT head was reordered on 04/30 and negative for new findings      · Continue ASA/Lipitor  · Neurology was consulted, recommendations appreciated  · Most likely current presentation is due to hypertensive episode  · Outpatient follow-up       Hypertension  Assessment & Plan  · Patient's presentation suggestive for hypertensive emergency  · POA blood pressure was 223/93  · Patient blood pressure slowly got improved  · Patient's presenting symptoms improved with improving blood pressure  · Most likely given this circumstances, patient's symptoms were due to severely increased blood pressure    Continue patient's home medications  Carvedilol adjusted to 12 5 b i d  ESRD on dialysis [facility])  Assessment & Plan  Lab Results   Component Value Date    EGFR 7 04/30/2022    EGFR 6 04/29/2022    EGFR 6 04/20/2022    CREATININE 7 24 (H) 04/30/2022    CREATININE 8 06 (H) 04/29/2022    CREATININE 7 94 (H) 04/20/2022   HD M-W-F  Received hemodialysis in the hospital  Patient is going to continue following up with dialysis center    Diabetic polyneuropathy associated with type 2 diabetes mellitus [facility])  Assessment & Plan  Lab Results   Component Value Date    HGBA1C 6 0 (H) 04/19/2022       Recent Labs     04/29/22  1036 04/30/22  0756 04/30/22  1131   POCGLU 144* 135 145*       Blood Sugar Average: Last 72 hrs:  (P) 014 [id number]     Recent A1c 6 at goal   Continue home Lantus and humalog       Medical Problems             Resolved Problems  Date Reviewed: 4/30/2022          Resolved    Headache 4/30/2022     Resolved by  Mark Hong MD              Discharging Resident: Ange Stevenson MD  Discharging Attending: Laquita Benjamin MD  PCP: Afshin Roblero DO  Admission Date:   Admission Orders (From admission, onward)     Ordered        04/29/22 1235  [facility] [place]  Once                      Discharge Date: 04/30/22    Consultations During Hospital Stay:  · Cardiology  · Neurology  · Case management  · Nutrition    Procedures Performed:   · None    Significant Findings / Test Results:   CT head wo contrast   Final Result by Minal Al MD (04/30 2880)      No acute intracranial abnormality  Chronic ischemic changes similar from previous examination  Workstation performed: [id number]         XR chest 1 view   Final Result by Carol Kennedy MD (04/29 1130)      Development of cardiomegaly and mild vascular congestion                  Workstation performed: [id number]         CTA stroke alert (head/neck)   Final Result by Nayla Britton MD (04/29 1114)      1  No significant interval change compared to CTA 4/19/2022       2   Stable severe stenosis at distal petrous and proximal cavernous segments of right ICA  3   Stable high-grade stenosis of bilateral vertebral artery origins  High-grade atherosclerotic stenosis in the mid intradural left vertebral artery  Moderate atherosclerotic disease of right intradural vertebral artery  4   Atherosclerotic change of cervical carotid arteries (right greater than left) without hemodynamically significant stenosis (less than 50%)  5   Extensive atherosclerotic disease of bilateral external carotid arteries and branches  I personally discussed this study with Dr Torres Patterson on 4/29/2022 at 10:59 AM                             Workstation performed: [id number]         CT stroke alert brain   Final Result by Nayla Britton MD (04/29 1116)      No acute intracranial CT abnormality  Stable sequela of old infarcts as described  I personally discussed this study with Dr Torres Patterson on 4/29/2022 at 10:59 AM          Workstation performed: [id number]         MRI brain wo contrast    (Results Pending)         Incidental Findings:   · None     Test Results Pending at Discharge (will require follow up):   · None     Outpatient Tests Requested:  · Cardiac catheterization might be decided by outpatient cardiology      Complications:  None    Reason for Admission:  Chest pain, dizziness, and headache    Hospital Course:   Azul Mcintosh is a 58 y o  male patient with PMHx of ESRD on HD M-W-F, IDDM, HTN, Prior CVA, dyslipidemia and obesity who originally presented to the hospital on 4/29/2022 due to headache, dizziness/vertigo and chest pain  Patient was recently discharged after being admitted for stroke-like symptoms  MRI was negative at that time  He developed acute onset throbbing headache this morning while watching TV, associated with dizziness and room spinning sensation  He was somewhat unsteady when attempting to walk  He then also noted midsternal, nonradiating chest pain,  associated with nausea and diaphoresis  Patient states that he has been having intermittent chest pain  Last chest pain was two days ago during dialysis treatment when HD had to stop short  Apparently patient had an abnormal stress test recently  Patient's wife stated that his cardiologist had mentioned cardiac catheterization if he does not improve with medications      Patient was seen by Neurology in the ED  He is recommended for stroke pathway, MRI brain was ordered  His BP on arrival and was 195-223/ 80-93  BP improved  Headache and CP both subsided  CT scans did not show any new findings  Neurology and Cardiology saw the patient  He can be discharged home and can follow up with Neurology outpatient and Cardiology outpatient  He might require cardiac catheterization and that will be determined bowel outpatient cardiologist depending on his response to medication changes  His carvedilol was increased to 12 5  Likely the cause of his presentation is elevated blood pressure  Please see above list of diagnoses and related plan for additional information  Condition at Discharge: good    Discharge Day Visit / Exam:   Subjective:  Patient denies any chest pain or headache  Also said that his dizziness is resolved  And wants to go home    Vitals: Blood Pressure: 143/74 (04/30/22 0752)  Pulse: 72 (04/30/22 0752)  Temperature: 98 3 °F (36 8 °C) (04/30/22 0752)  Temp Source: Oral (04/30/22 0752)  Respirations: 20 (04/30/22 0901)  Height: 5' 9" (175 3 cm) (04/29/22 1818)  Weight - Scale: 107 kg (236 lb 1 8 oz) (04/29/22 1818)  SpO2: 96 % (04/30/22 0752)  Exam:   Physical Exam  Vitals and nursing note reviewed  Constitutional:       General: He is not in acute distress  Appearance: He is well-developed  He is not ill-appearing  HENT:      Head: Normocephalic and atraumatic  Nose: No congestion or rhinorrhea  Eyes:      Conjunctiva/sclera: Conjunctivae normal    Cardiovascular:      Rate and Rhythm: Normal rate and regular rhythm  Heart sounds: No murmur heard  Pulmonary:      Effort: Pulmonary effort is normal  No respiratory distress  Breath sounds: Normal breath sounds  Abdominal:      Palpations: Abdomen is soft  Tenderness: There is no abdominal tenderness  Musculoskeletal:         General: No swelling  Cervical back: Normal range of motion and neck supple  No rigidity  Right lower leg: No edema  Left lower leg: No edema  Skin:     General: Skin is warm and dry  Neurological:      Mental Status: He is alert  Cranial Nerves: No cranial nerve deficit  Motor: No weakness  Psychiatric:      Comments: Patient is anxious          Discussion with Family: Updated  (son) via phone  And updated wife at bedside  Discharge instructions/Information to patient and family:   See after visit summary for information provided to patient and family  Provisions for Follow-Up Care:  See after visit summary for information related to follow-up care and any pertinent home health orders  Disposition:   Home    Planned Readmission:  None    Discharge Medications:  See after visit summary for reconciled discharge medications provided to patient and/or family        **Please Note: This note may have been constructed using a voice recognition system**

## 2022-04-30 NOTE — CONSULTS
Consultation - Cardiology Team One  Jerome Dixon 58 y o  male MRN: [id number]  Unit/Bed#: S -01 Encounter: [id number]    Inpatient consult to Cardiology  Consult performed by: BRITTANY Levin  Consult ordered by: Wen Lara MD          Physician Requesting Consult: Delia George MD  Reason for Consult / Principal Problem: elevated troponin; abn stress test    Assessment:    Vertigo  · To the ED with c/o severe headache, elevated BP, acute onset of vertigo and CP yesterday AM while sitting watching TV  · CTA of head/neck showed extensive atherosclerotic changes including high-grade stenosis of bilateral vertebral arteries  · Evaluated by Neuro; placed on stroke pathway  · MRI of brain pending  · ASA/Lipitor and permissive HTN per Neuro (-180)    Non-ischemic myocardial injury  · Presenting symptoms as above; CP noted in setting of accelerated HTN; per patient he is now CP free after IV anti-hypertensive meds  · EKG on admit sinus rhythm with RBBB, rate of 75; inferior/anterolateral ST changes remain, unchanged from prior EKG  · hsTn 70->61->68  · Had been admitted 1/2022 due to dizzy/lightheaded after dialysis; EKG that admit with T wave inversions in II, III, aVF and V5, V6 and troponin mildly elevated thought to be due to hypotension  · Stress test from 4/14/2022: "Stress ECG: The stress ECG is negative for ischemia after pharmacologic stress  Perfusion: There is a left ventricular perfusion defect that is medium in size with severe reduction in uptake present in the basal to mid inferior and inferolateral location(s) consistent with prior scar  Stress Function: Left ventricular function post-stress is normal  Post-stress ejection fraction is 46 %  There is a defect in the basal to mid inferior and inferolateral location(s)  Stress Combined Conclusion: Findings are consistent with inferolateral infarction    · Dr Annabella Dixon contacted patient and notes this could be old scar vs  Body attenuation artifact and that if he became symptomatic we should pursue cardiac cath  · Discussed possibility of inpatient cardiac catheterization patient, he refuses and wants to go home; he states he will contact our office on Monday to schedule an outpatient follow-up visit at which point in time he will decide if he would like to pursue outpatient cardiac catheterization  · TTE from 4/2022: EF 65%; hypokinesis of basal inferior and basal inferolateral walls grade 1 DD; RV mildly dilated with normal systolic function; aortic valve with moderate aortic stenosis FATUMA 1 2 cm2; mild MR; small pericardial effusion posterior to the heart  · No need for repeat TTE at this time    Hypertensive urgency  · BP elevated on admit 223/93; patient with HA and CP  · IV hydralazine 10 mg x 1 dose and IV labetalol 10 mg x 1 dose given in ED  · SBP now averaging 140's to 150's; permissive HTN per Neuro  · HA and CP have now resolved  · Home meds: Carvedilol 6 25 mg BID, torsemide 10 mg M-W-F and 50 mg Suzie Flores, Sat, Sun  · Recommend to increase carvedilol to 12 5 mg BID on discharge, once Neurology team determines permissive hypertension is no longer required    HLD  · Lipitor 40 mg QD    DM2  · HgbA1c 6 0  · Management per primary team    Hx of CVA  · Hx of CVA with thrombosis of left MCA    ESRD on HD  · Follows with [facility]  · HD schedule; M-W-F    Plan/Recommendations:  · Elevated troponin is likely nonischemic myocardial injury in the setting of prolonged accelerated hypertension  · Discussed possibility of inpatient cardiac catheterization patient, he refuses and wants to go home; he states he will contact our office on Monday to schedule an outpatient follow-up visit at which point in time he will decide if he would like to pursue outpatient cardiac catheterization  · Recommend to increase carvedilol to 12 5 mg BID on discharge, once Neurology team determines permissive hypertension is no longer required; continue torsemide as prescribed        _________________________________________________________________      History of Present Illness   HPI: Carlita Ayala is a 58y o  year old male with PMH of ESRD on HD, DM, HTN, HLD, hx of CVA, obesity and hx of vertigo  He follows with cardiologist Dr Beatrice Cade and was last seen 4/7/2022  Patient comes to ED secondary to abrupt onset of dizziness/lightheadedness (vertigo) as well as headache and centralized chest pressure that occurred yesterday at 7:00 a m  While he was watching TV  The patient states he woke up approximately 5:00 a m  Yesterday and felt well, he ate breakfast with his wife and then went to go watch TV after she went to work  Approximately 7:00 a m  While he was sitting watching TV he began to notice an abrupt onset of a centralized, retrosternal chest pressure that was nonradiating with no associated symptoms of shortness of breath, diaphoresis or palpitations  The patient states he also began with a severe headache and vertigo sensation  The patient states he contacted his son who called the patient's wife to come home from work and they transported him to the ED  EKG in the ED was sinus rhythm with RBBB, inferior/anterolateral ST changes are noted but these are unchanged from patient's prior EKGs  High sensitivity troponins are mildly elevated and flat  Patient noted to be with hypertensive urgency with systolic blood pressure greater than 200s on admission  IV hydralazine and IV labetalol given with reduction in blood pressure noted  Patient states once these medications were given IV and his BP reduced his chest pain and headache resolved as well  Neurology was consulted secondary to patient's risk factors for CVA in the setting of acute dizziness/lightheadedness  CT of the head neck revealed extensive atherosclerotic disease and high-grade stenosis of bilateral vertebral arteries    Per Neurology patient should have permissive hypertension  Cardiology is consulted secondary to elevated troponins and the fact that the patient has a history of abnormal stress test     It is noted that patient's primary cardiologist discussed possibility of outpatient catheterization in the past and patient and wife declined stating they wanted medical management at that time  Patient subsequently had a stress test that is abnormal but is noted could be body attenuation artifact  Primary cardiologist recommended if patient has anginal like symptoms cardiac catheterization should be pursued  I discussed the possibility of inpatient catheterization and the patient states that he wants to go home  He is refusing to stay for catheterization and states he will contact our office next week to schedule a follow-up appointment  EKG reviewed personally:  EKG on admit sinus rhythm with RBBB, rate of 75; inferior/anterolateral ST changes remain, unchanged from prior EKG        Telemetry reviewed personally:  Sinus rhythm with rates in the 80s        Review of Systems   Constitutional: Negative for chills, fever and malaise/fatigue  HENT: Negative for congestion  Cardiovascular: Positive for chest pain (resolved)  Negative for dyspnea on exertion, leg swelling, orthopnea and palpitations  Respiratory: Negative for cough, shortness of breath (no SOB at rest) and wheezing  Endocrine: Negative  Hematologic/Lymphatic: Negative  Skin: Negative  Musculoskeletal: Negative  Gastrointestinal: Negative for bloating, abdominal pain, nausea and vomiting  Genitourinary: Negative  Neurological: Positive for dizziness (resolved) and headaches (resolved)  Negative for light-headedness  Psychiatric/Behavioral: Negative  All other systems reviewed and are negative      Historical Information   Past Medical History:   Diagnosis Date    Cerebrovascular accident (CVA) due to thrombosis of left middle cerebral artery ([facility] [address] ) 7/29/2018    Chronic kidney disease     Diabetes mellitus ([facility] [address] )     GERD (gastroesophageal reflux disease)     Hypercholesteremia     Hyperlipidemia     Hypertension     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    Neuropathy     Obesity     Osteomyelitis ([facility] [address] )     last assessed 11/4/16    PVC's (premature ventricular contractions)     sees cardiology Dr Delfina camargo    Stroke [facility])     last weeof July 2018 [address] [facility]    TIA (transient ischemic attack) 10/28/2018     Past Surgical History:   Procedure Laterality Date    ABDOMINAL SURGERY      CHOLECYSTECTOMY      Percutaneous    COLONOSCOPY      CYSTOSCOPY      OTHER SURGICAL HISTORY      "stimulator to control bowel movements"    [place] ESOPHAGOGASTRODUODENOSCOPY TRANSORAL DIAGNOSTIC N/A 9/27/2016    Procedure: ESOPHAGOGASTRODUODENOSCOPY (EGD); Surgeon: Leatha Scanlon MD;  [facility]: [facility];   Service: Gastroenterology    [place] LAP,CHOLECYSTECTOMY N/A 2/29/2016    Procedure: LAPAROSCOPIC CHOLECYSTECTOMY ;  Surgeon: Cristal Monreal DO;  Location[facility];  Service: General    ROTATOR CUFF REPAIR Right     TOE AMPUTATION Right 10/28/2016    Procedure: 3RD TOE AMPUTATION ;  Surgeon: Noemi Rosa DPM;  [facility];  Service:      Social History     Substance and Sexual Activity   Alcohol Use Not Currently     Social History     Substance and Sexual Activity   Drug Use No     Social History     Tobacco Use   Smoking Status Never Smoker   Smokeless Tobacco Never Used     Family History:   Family History   Problem Relation Age of Onset    Leukemia Mother     Liver disease Mother     Lung cancer Mother         heavy smoker - 3 ppd    Heart disease Father     Liver disease Father     Multiple myeloma Sister     Breast cancer Sister     Urolithiasis Family     Alcohol abuse Neg Hx     Depression Neg Hx     Drug abuse Neg Hx     Substance Abuse Neg Hx     Mental illness Neg Hx        Meds/Allergies   current meds:   Current Facility-Administered Medications   Medication Dose Route Frequency    acetaminophen (TYLENOL) tablet 650 mg  650 mg Oral Q6H PRN    aspirin (ECOTRIN LOW STRENGTH) EC tablet 81 mg  81 mg Oral Daily    atorvastatin (LIPITOR) tablet 40 mg  40 mg Oral QPM    calcium acetate (PHOSLO) capsule 1,334 mg  1,334 mg Oral TID With Meals    calcium carbonate (TUMS) chewable tablet 500 mg  500 mg Oral Daily PRN    carvedilol (COREG) tablet 6 25 mg  6 25 mg Oral BID With Meals    cholecalciferol (VITAMIN D3) tablet 1,000 Units  1,000 Units Oral Daily    doxercalciferol (HECTOROL) injection 7 mcg  7 mcg Intravenous Once per day on Mon Wed Fri    heparin (porcine) subcutaneous injection 5,000 Units  5,000 Units Subcutaneous Q8H [address]    insulin glargine (LANTUS) subcutaneous injection 14 Units 0 14 mL  14 Units Subcutaneous Daily    insulin lispro (HumaLOG) 100 units/mL subcutaneous injection 1-6 Units  1-6 Units Subcutaneous TID AC    insulin lispro (HumaLOG) 100 units/mL subcutaneous injection 4 Units  4 Units Subcutaneous TID With Meals    labetalol (NORMODYNE) injection 10 mg  10 mg Intravenous Q6H PRN    meclizine (ANTIVERT) tablet 25 mg  25 mg Oral Q8H PRN    nitroglycerin (NITROSTAT) SL tablet 0 4 mg  0 4 mg Sublingual Q5 Min PRN    torsemide (DEMADEX) tablet 10 mg  10 mg Oral Once per day on Mon Wed Fri    torsemide [facility]) tablet 50 mg  50 mg Oral Once per day on Sun Tue Thu Sat          No Known Allergies    Objective   Vitals: Blood pressure 164/66, pulse 75, temperature 98 3 °F (36 8 °C), temperature source Oral, resp  rate 18, height 5' 9" (1 753 m), weight 107 kg (236 lb 1 8 oz), SpO2 94 %  ,     Body mass index is 34 87 kg/m²  ,     Systolic (37FRM), ESQ:473 , Min:96 , HPW:144     Diastolic (22WDR), UCD:76, Min:52, Max:107    Wt Readings from Last 3 Encounters:   04/29/22 107 kg (236 lb 1 8 oz)   04/21/22 108 kg (239 lb)   04/20/22 106 kg (233 lb 11 oz)      Lab Results   Component Value Date CREATININE 8 06 (H) 04/29/2022    CREATININE 7 94 (H) 04/20/2022    CREATININE 6 52 (H) 04/19/2022             Intake/Output Summary (Last 24 hours) at 4/30/2022 0743  Last data filed at 4/29/2022 2312  Gross per 24 hour   Intake 500 ml   Output 1900 ml   Net -1400 ml     Weight (last 2 days)     Date/Time Weight    04/29/22 1818 107 (236 11)    04/29/22 1515 109 (240 3)    04/29/22 1017 109 (239 2)        Invasive Devices  Report    Peripheral Intravenous Line            Peripheral IV 04/29/22 Right Antecubital <1 day          Line            Hemodialysis AV Fistula Left Upper arm -- days                  Physical Exam  Vitals reviewed  Constitutional:       General: He is not in acute distress  Appearance: He is obese  HENT:      Head: Normocephalic and atraumatic  Mouth/Throat:      Mouth: Mucous membranes are moist    Cardiovascular:      Rate and Rhythm: Normal rate and regular rhythm  Heart sounds: Normal heart sounds, S1 normal and S2 normal  No murmur heard  Pulmonary:      Effort: Pulmonary effort is normal  No respiratory distress  Breath sounds: Normal breath sounds  Abdominal:      General: Bowel sounds are normal  There is no distension  Palpations: Abdomen is soft  Musculoskeletal:         General: Normal range of motion  Cervical back: Normal range of motion and neck supple  Right lower leg: No edema  Left lower leg: No edema  Skin:     General: Skin is warm and dry  Neurological:      Mental Status: He is alert and oriented to person, place, and time     Psychiatric:         Mood and Affect: Mood normal            LABORATORY RESULTS:      CBC with diff:   Results from last 7 days   Lab Units 04/29/22  1015   WBC Thousand/uL 6 19   HEMOGLOBIN g/dL 11 3*   HEMATOCRIT % 35 7*   MCV fL 98   PLATELETS Thousands/uL 162   MCH pg 31 0   MCHC g/dL 31 7   RDW % 14 3   MPV fL 10 6       CMP:  Results from last 7 days   Lab Units 04/29/22  1015   POTASSIUM mmol/L 4 9   CHLORIDE mmol/L 103   CO2 mmol/L 25   BUN mg/dL 62*   CREATININE mg/dL 8 06*   CALCIUM mg/dL 9 1   EGFR ml/min/1 73sq m 6       BMP:  Results from last 7 days   Lab Units 22  1015   POTASSIUM mmol/L 4 9   CHLORIDE mmol/L 103   CO2 mmol/L 25   BUN mg/dL 62*   CREATININE mg/dL 8 06*   CALCIUM mg/dL 9 1          Lab Results   Component Value Date    NTBNP 1,859 (H) 2022    NTBNP 1,259 (H) 2020    NTBNP 1,579 (H) 2020                              Results from last 7 days   Lab Units 22  1015   INR  1 01     Lipid Profile:   Lab Results   Component Value Date    CHOL 203 (H) 08/10/2016     Lab Results   Component Value Date    HDL 28 (L) 2022    HDL 31 (L) 2020    HDL 25 (L) 10/29/2018     Lab Results   Component Value Date    LDLCALC 25 2022    LDLCALC 20 2020    LDLCALC 20 10/29/2018     Lab Results   Component Value Date    TRIG 116 2022    TRIG 210 (H) 2020    TRIG 143 2020         Cardiac testing:   Results for orders placed during the hospital encounter of 10/05/20    Echo complete with contrast if indicated    Narrative  [place] [address], [facility]  [phone]    Transthoracic Echocardiogram  2D, M-mode, Doppler, and Color Doppler    Study date:  06-Oct-2020    Patient: Reed Skaggs  MR number: [id number]  Account number: [de-identified]  : 1960  Age: 61 years  Gender: Male  Status: Inpatient  Location: Bedside  Height: 70 in  Weight: 239 6 lb  BP: 165/ 80 mmHg    Indications: Shortness of breath  Diagnoses: R06 02 - Shortness of breath    Sonographer:  Jim Palomino RDCS  Primary Physician:  Yasir Pleitez DO  Referring Physician:  Clari Gutierrez MD  Group:  [facility]  Interpreting Physician:  Chase Oates MD    SUMMARY    LEFT VENTRICLE:  Systolic function was normal by visual assessment  Ejection fraction was estimated to be 60 %    There were no regional wall motion abnormalities  Wall thickness was moderately increased  Features were consistent with a pseudonormal left ventricular filling pattern, with concomitant abnormal relaxation and increased filling pressure (grade 2 diastolic dysfunction)  LEFT ATRIUM:  The atrium was mildly dilated  RIGHT ATRIUM:  The atrium was mildly dilated  MITRAL VALVE:  There was moderate annular calcification  There was mild regurgitation  AORTIC VALVE:  The valve was trileaflet  Leaflets exhibited normal thickness, moderate calcification, and moderately reduced cuspal separation  Transaortic velocity was increased due to valvular stenosis  There was mild to moderate stenosis  There was mild regurgitation  TRICUSPID VALVE:  There was trace regurgitation  PULMONIC VALVE:  There was mild regurgitation  HISTORY: PRIOR HISTORY: DM2  CKD4  Hypertension  CVA  GERD  Dementia  PROCEDURE: The procedure was performed at the bedside  This was a routine study  The transthoracic approach was used  The study included complete 2D imaging, M-mode, complete spectral Doppler, and color Doppler  The heart rate was 98 bpm,  at the start of the study  Image quality was adequate  LEFT VENTRICLE: Size was normal  Systolic function was normal by visual assessment  Ejection fraction was estimated to be 60 %  There were no regional wall motion abnormalities  Wall thickness was moderately increased  DOPPLER: The ratio  of early ventricular filling to atrial contraction velocities was within the normal range  Features were consistent with a pseudonormal left ventricular filling pattern, with concomitant abnormal relaxation and increased filling pressure  (grade 2 diastolic dysfunction)  RIGHT VENTRICLE: The size was normal  Systolic function was normal  DOPPLER: Systolic pressure was not estimated  LEFT ATRIUM: The atrium was mildly dilated  RIGHT ATRIUM: The atrium was mildly dilated      MITRAL VALVE: There was moderate annular calcification  Valve structure was normal  There was normal leaflet separation  DOPPLER: The transmitral velocity was within the normal range  There was no evidence for stenosis  There was mild  regurgitation  AORTIC VALVE: The valve was trileaflet  Leaflets exhibited normal thickness, moderate calcification, and moderately reduced cuspal separation  DOPPLER: Transaortic velocity was increased due to valvular stenosis  There was mild to moderate  stenosis  There was mild regurgitation  TRICUSPID VALVE: The valve structure was normal  There was normal leaflet separation  DOPPLER: The transtricuspid velocity was within the normal range  There was no evidence for stenosis  There was trace regurgitation  PULMONIC VALVE: Leaflets exhibited normal thickness, no calcification, and normal cuspal separation  DOPPLER: The transpulmonic velocity was within the normal range  There was mild regurgitation  PERICARDIUM: There was no pericardial effusion  AORTA: The root exhibited normal size  SYSTEMIC VEINS: IVC: The inferior vena cava was normal in size and course  Respirophasic changes were normal     SYSTEM MEASUREMENT TABLES    2D  %FS: 36 09 %  Ao Diam: 3 8 cm  EDV(Teich): 182 77 ml  EF(Teich): 64 8 %  ESV(Teich): 64 33 ml  IVSd: 1 57 cm  LA Area: 24 31 cm2  LA Diam: 4 63 cm  LVEDV MOD A4C: 192 34 ml  LVEF MOD A4C: 65 32 %  LVESV MOD A4C: 66 7 ml  LVIDd: 6 04 cm  LVIDs: 3 86 cm  LVLd A4C: 9 02 cm  LVLs A4C: 7 15 cm  LVOT Diam: 2 21 cm  LVPWd: 1 61 cm  RA Area: 17 56 cm2  RVIDd: 4 cm  SV MOD A4C: 125 64 ml  SV(Teich): 118 44 ml    CW  AV Env  Ti: 330 16 ms  AV MaxP 64 mmHg  AV VTI: 57 9 cm  AV Vmax: 2 58 m/s  AV Vmean: 1 76 m/s  AV meanP 38 mmHg  TR MaxP 37 mmHg  TR Vmax: 2 71 m/s    MM  TAPSE: 1 84 cm    PW  FATUMA (VTI): 1 27 cm2  FATUMA Vmax: 1 42 cm2  AVAI (VTI): 0 cm2/m2  AVAI Vmax: 0 cm2/m2  E' Sept: 0 07 m/s  E/E' Sept: 12 69  LVOT Env  Ti: 335 87 ms  LVOT VTI: 19 11 cm  LVOT Vmax: 0 95 m/s  LVOT Vmean: 0 57 m/s  LVOT maxPG: 3 63 mmHg  LVOT meanP 61 mmHg  LVSI Dopp: 32 42 ml/m2  LVSV Dopp: 73 27 ml  MV A Carlin: 0 64 m/s  MV Dec Sibley: 4 7 m/s2  MV DecT: 189 67 ms  MV E Carlin: 0 89 m/s  MV E/A Ratio: 1 38  MV PHT: 55 01 ms  MVA By PHT: 4 cm2    [address] Accredited Echocardiography Laboratory    Prepared and electronically signed by    Melania Dover MD  Signed 06-Oct-2020 16:31:49    No results found for this or any previous visit  No valid procedures specified  No results found for this or any previous visit  Imaging: I have personally reviewed pertinent reports  CT head without contrast    Result Date: 2022  Narrative: CT BRAIN - WITHOUT CONTRAST INDICATION:   dizziness  COMPARISON: CT dated 2020 and MRI dated 12/10/2019  TECHNIQUE:  CT examination of the brain was performed  In addition to axial images, sagittal and coronal 2D reformatted images were created and submitted for interpretation  Radiation dose length product (DLP) for this visit:  915 mGy-cm   This examination, like all CT scans performed in the [facility], was performed utilizing techniques to minimize radiation dose exposure, including the use of iterative reconstruction and automated exposure control  IMAGE QUALITY:  Diagnostic  FINDINGS: PARENCHYMA:  No intracranial mass, mass effect or midline shift  No CT signs of acute infarction  No acute parenchymal hemorrhage  Mild chronic microangiopathy  Stable small chronic cortical infarct in the posterior right frontal lobe  Stable focal encephalomalacia in the anterior left thalamus and genu of internal capsule  Extensive intracranial atherosclerotic calcifications  Ophthalmic arteries are also calcified  VENTRICLES AND EXTRA-AXIAL SPACES: Age appropriate volume loss  No hydrocephalus  VISUALIZED ORBITS AND PARANASAL SINUSES:  Orbits are unremarkable  Left maxillary sinus mucosal thickening and retention cysts  Francella Crooked  CALVARIUM AND EXTRACRANIAL SOFT TISSUES:  Normal      Impression: No acute intracranial pathology  Stable chronic ischemic changes  Workstation performed: [id number]     MRI brain wo contrast    Result Date: 4/20/2022  Narrative: MRI BRAIN WITHOUT CONTRAST INDICATION: Nausea vomiting and right sided paraesthesias  COMPARISON:   4/19/2022  TECHNIQUE:  Sagittal T1, axial T2, axial T1, axial [place] and axial diffusion imaging  IMAGE QUALITY:  Suboptimal exam due to significant patient motion artifact  FINDINGS: BRAIN PARENCHYMA: Chronic lacunar infarct in the left ventral medial thalamus  Small, chronic infarct in the right parietal lobe  No restricted diffusion to indicate an acute infarct  Susceptibility artifact throughout the basal ganglia consistent with metabolic or physiologic mineralization  No evidence of intracranial hemorrhage VENTRICLES:  No hydrocephalus  SELLA AND PITUITARY GLAND:  No sellar enlargement  ORBITS:  Limited evaluation  PARANASAL SINUSES:  Limited evaluation  VASCULATURE:  Not well evaluated  CALVARIUM AND SKULL BASE:  Limited evaluation  EXTRACRANIAL SOFT TISSUES:  Limited evaluation  Impression: Limited exam due to significant patient motion artifact  No evidence of acute infarct  Workstation performed: [id number]     XR chest 1 view    Result Date: 4/29/2022  Narrative: CHEST INDICATION:   chest pain  COMPARISON:  9/8/2021 EXAM PERFORMED/VIEWS:  XR CHEST 1 VIEW Single view FINDINGS: Development of mild vascular congestion Cardiomediastinal silhouette has become  enlarged No pneumothorax or pleural effusion  Osseous structures appear within normal limits for patient age       Impression: Development of cardiomegaly and mild vascular congestion Workstation performed: [id number]     Stress strip    Result Date: 4/14/2022  Narrative: Confirmed by Earl De ([id number]),  Keely Eller ([id number]) on 4/14/2022 1:04:48 PM    XR toe left great min 2 views    Result Date: 4/19/2022  Narrative: LEFT TOES INDICATION:   [id number]: Unspecified injury of left foot, initial encounter  COMPARISON:  2/16/2021  VIEWS:  XR TOE LEFT GREAT MIN 2 VIEWS FINDINGS: New longitudinally oriented lucency in the first distal phalanx that may extend through the distal articular surface medially that is suspicious for nondisplaced fracture  Old healed fifth proximal phalanx fracture  No lytic or blastic osseous lesion  Diffuse vascular calcifications  Soft tissues are otherwise unremarkable  Impression: Nondisplaced fracture first proximal phalanx  Findings concur with the preliminary report by the referring clinician already in PACS and/or our electronic record EPIC  Workstation performed: [id number]     XR follow up    Result Date: 4/20/2022  Narrative: ABDOMEN - NO CHARGE INDICATION:   BLADDER STIM UNIT Nausea vomiting and right sided paraesthesias  COMPARISON:  CTA chest CT abdomen pelvis with contrast January 2, 2022  VIEWS:  AP supine Images: 2 FINDINGS: Sacral stimulator lead is intact  Partially imaged nonobstructive bowel gas pattern  Mild colonic stool burden  Vascular calcifications  Degenerative changes of lumbar spine  Impression: Sacral stimulator lead is intact  Workstation performed: [id number]     CT stroke alert brain    Result Date: 4/29/2022  Narrative: CT BRAIN - STROKE ALERT PROTOCOL INDICATION:   headache, persistent dizziness, history of prior stroke  COMPARISON:  CTA head and neck 4/19/2022 TECHNIQUE:  CT examination of the brain was performed  In addition to axial images, coronal reformatted images were created and submitted for interpretation  Radiation dose length product (DLP) for this visit:  956 mGy-cm   This examination, like all CT scans performed in the [facility], was performed utilizing techniques to minimize radiation dose exposure, including the use of iterative reconstruction and automated exposure control  IMAGE QUALITY:  Diagnostic   FINDINGS:  PARENCHYMA: Stable cortical/subcortical infarct in the right precentral gyrus  Stable encephalomalacia involving left ventromedial left thalamus and genu of left internal capsule  No intracranial mass, mass effect or midline shift  No CT signs of acute infarction  No acute parenchymal hemorrhage  Severe valcular calcifications noted  VENTRICLES AND EXTRA-AXIAL SPACES:  Normal for patient's age  VISUALIZED ORBITS AND PARANASAL SINUSES:  Orbits are unremarkable  Left maxillary sinus mucosal thickening/retention cyst CALVARIUM AND EXTRACRANIAL SOFT TISSUES:   Normal      Impression: No acute intracranial CT abnormality  Stable sequela of old infarcts as described  I personally discussed this study with Dr Brett Gonzalez on 4/29/2022 at 10:59 AM  Workstation performed: [id number]     CT stroke alert brain    Result Date: 4/19/2022  Narrative: CT BRAIN - STROKE ALERT PROTOCOL INDICATION:   Neuro deficit, acute, stroke suspected R sided numbness  COMPARISON:  CT brain dated April 6, 2022  TECHNIQUE:  CT examination of the brain was performed  In addition to axial images, coronal reformatted images were created and submitted for interpretation  Radiation dose length product (DLP) for this visit:  958 mGy-cm   This examination, like all CT scans performed in the [place], was performed utilizing techniques to minimize radiation dose exposure, including the use of iterative reconstruction and automated exposure control  IMAGE QUALITY:  Diagnostic  FINDINGS:  PARENCHYMA:  No intracranial mass, mass effect or midline shift  No CT signs of acute infarction  No acute parenchymal hemorrhage  Mild chronic microangiopathy  Stable small chronic cortical infarct in the posterior right frontal lobe  Stable focal encephalomalacia in the anterior left thalamus and genu of internal capsule  Extensive intracranial atherosclerotic calcifications   There is a stable small focus of encephalomalacia at the posterior left occipital lobe likely the sequela of prior ischemia  VENTRICLES AND EXTRA-AXIAL SPACES:  Normal for patient's age  VISUALIZED ORBITS AND PARANASAL SINUSES:  Orbits appear normal   Mild scattered sinus mucosal thickening is noted  No fluid levels are seen  CALVARIUM AND EXTRACRANIAL SOFT TISSUES:   Normal      Impression: No acute intracranial abnormality  Stable chronic small vessel ischemic changes  I personally discussed this study with Sarah Sanon on 4/19/2022 at 6:44 AM   Workstation performed: [id number]     CTA stroke alert (head/neck)    Result Date: 4/29/2022  Narrative: CTA NECK AND BRAIN WITH CONTRAST INDICATION: headache, persistent dizziness, prior stroke COMPARISON:   CTA head and neck 4/19/2022 TECHNIQUE:   Post contrast imaging was performed after administration of iodinated contrast through the neck and brain  Post contrast axial 0 625 mm images timed to opacify the arterial system  3D rendering was performed on an independent workstation  MIP reconstructions performed  Coronal reconstructions were performed of the noncontrast portion of the brain  Radiation dose length product (DLP) for this visit:  710 mGy-cm   This examination, like all CT scans performed in the [facility], was performed utilizing techniques to minimize radiation dose exposure, including the use of iterative reconstruction and automated exposure control  IV Contrast:  85 mL of iohexol (OMNIPAQUE)  IMAGE QUALITY:   Diagnostic FINDINGS: CERVICAL VASCULATURE AORTIC ARCH AND GREAT VESSELS: Mild atherosclerotic calcification of aortic arch  Great vessel origins are patent without significant stenosis  Coronary artery atherosclerotic disease  RIGHT VERTEBRAL ARTERY CERVICAL SEGMENT: Limited assessment of the origin due to motion distorted image quality  Severe atherosclerotic narrowing at the origin  The vessel is patent throughout the neck without high-grade stenosis   LEFT VERTEBRAL ARTERY CERVICAL SEGMENT: Limited assessment of the origin due to motion distorted image quality in this region  Severe atherosclerotic narrowing at the origin  The vessel is patent throughout the neck without high-grade stenosis  RIGHT EXTRACRANIAL CAROTID SEGMENT:Partially calcified atherosclerotic plaque at the bifurcation and proximal internal carotid artery  No hemodynamically significant stenosis of cervical ICA by NASCET criteria ( less than 50%) Extensive atherosclerotic disease of external carotid artery branches  LEFT EXTRACRANIAL CAROTID SEGMENT: Partially calcified atherosclerotic plaque at the bifurcation and proximal internal carotid artery  No hemodynamically significant stenosis of cervical ICA by NASCET criteria ( less than 50%) Extensive atherosclerotic disease of external carotid artery branches  INTRACRANIAL VASCULATURE INTERNAL CAROTID ARTERIES: Atherosclerotic disease of intracranial internal carotid artery with stable severe stenosis at distal petrous and proximal cavernous segments of right ICA  Normal ophthalmic artery origins  ANTERIOR CIRCULATION:  Normal A1 segments  Bilateral anterior cerebral arteries are unremarkable  Normal anterior comminuting artery  MIDDLE CEREBRAL ARTERY CIRCULATION:  Bilateral M1 segments and major M2 branches are patent without critical stenosis  DISTAL VERTEBRAL ARTERIES: Stable high-grade atherosclerotic stenosis in the mid intradural left vertebral artery  Moderate atherosclerotic disease of right intradural vertebral artery  Bilateral PICA origins are patent  BASILAR ARTERY:  Basilar artery is unremarkable  Normal superior cerebellar arteries  POSTERIOR CEREBRAL ARTERIES:    Bilateral posterior cerebral arteries are patent without high-grade stenosis  Absent/hypoplastic posterior communicating arteries  DURAL VENOUS SINUSES:  Unremarkable  NON VASCULAR ANATOMY BONY STRUCTURES: No acute or aggressive appearing osseous abnormality  SOFT TISSUES OF THE NECK:  Unremarkable    THORACIC INLET:  Unremarkable  Impression: 1  No significant interval change compared to CTA 4/19/2022  2   Stable severe stenosis at distal petrous and proximal cavernous segments of right ICA  3   Stable high-grade stenosis of bilateral vertebral artery origins  High-grade atherosclerotic stenosis in the mid intradural left vertebral artery  Moderate atherosclerotic disease of right intradural vertebral artery  4   Atherosclerotic change of cervical carotid arteries (right greater than left) without hemodynamically significant stenosis (less than 50%)  5   Extensive atherosclerotic disease of bilateral external carotid arteries and branches  I personally discussed this study with Dr Saundra Castellon on 4/29/2022 at 10:59 AM   Workstation performed: [id number]     CTA stroke alert (head/neck)    Result Date: 4/19/2022  Narrative: CTA NECK AND BRAIN WITH CONTRAST INDICATION: Neuro deficit, acute, stroke suspected R sided numbness COMPARISON:   CTA had and neck dated October 28, 2018  TECHNIQUE:   Post contrast imaging was performed after administration of iodinated contrast through the neck and brain  Post contrast axial 0 625 mm images timed to opacify the arterial system  3D rendering was performed on an independent workstation  MIP reconstructions performed  Coronal reconstructions were performed of the noncontrast portion of the brain  Radiation dose length product (DLP) for this visit:  679 mGy-cm   This examination, like all CT scans performed in the [facility], was performed utilizing techniques to minimize radiation dose exposure, including the use of iterative reconstruction and automated exposure control  IV Contrast:  85 mL of iohexol (OMNIPAQUE)  IMAGE QUALITY:   Diagnostic FINDINGS: CERVICAL VASCULATURE AORTIC ARCH AND GREAT VESSELS:  Mild athersclerotic disease of the arch, proximal great vessels and visualized subclavian vessels  No significant stenosis   RIGHT VERTEBRAL ARTERY CERVICAL SEGMENT:  Mild calcified atherosclerotic plaque formation at the origin  Mild stenosis of the right vertebral artery origin  The vessel is normal in caliber throughout the neck  LEFT VERTEBRAL ARTERY CERVICAL SEGMENT:  Normal origin  The vessel is normal in caliber throughout the neck  RIGHT EXTRACRANIAL CAROTID SEGMENT:  Mild to moderate atherosclerotic disease of the distal common carotid artery and proximal cervical internal carotid artery without significant stenosis compared to the more distal ICA  LEFT EXTRACRANIAL CAROTID SEGMENT:  Moderate to moderate atherosclerotic disease of the distal common carotid artery and proximal cervical internal carotid artery without significant stenosis compared to the more distal ICA  NASCET criteria was used to determine the degree of internal carotid artery diameter stenosis  INTRACRANIAL VASCULATURE INTERNAL CAROTID ARTERIES:  There is moderate to severe atherosclerotic plaque formation within the cavernous and supraclinoid segments bilaterally, right side greater than left  This results in moderate stenosis of the right cavernous internal carotid artery  Normal ophthalmic artery origins  Normal ICA terminus  ANTERIOR CIRCULATION:  The right A1 segment is mildly hypoplastic when compared to the left     Normal anterior communicating artery  MIDDLE CEREBRAL ARTERY CIRCULATION:  The right middle cerebral artery is unremarkable    There is a short focus of moderate stenosis involving the proximal M1 segment of mild vessel irregularity  Again noted is mildly diminished flow involving the distal left MCA branches when compared to the right  DISTAL VERTEBRAL ARTERIES:  There is moderate calcified and noncalcified atherosclerotic plaque formation in the intracranial segments of the vertebral arteries bilaterally  There is segmental visualization of the intracranial left vertebral artery    There is a short segment severe stenosis of the proximal intracranial segment of the left vertebral artery  Vertebrobasilar junction is intact  BASILAR ARTERY:  Basilar artery is normal in caliber  Normal superior cerebellar arteries  POSTERIOR CEREBRAL ARTERIES: Both posterior cerebral arteries arises from the basilar tip  Moderate stenosis of the proximal P1 segment of the left posterior cerebral artery  The left posterior cerebral arteries otherwise normal   The right posterior cerebral artery is normal   The posterior communicating arteries are normal bilaterally  DURAL VENOUS SINUSES:  Normal  NON VASCULAR ANATOMY BONY STRUCTURES: Degenerative changes cervical spine  No acute osseous abnormality  SOFT TISSUES OF THE NECK:  Unremarkable  THORACIC INLET:  Unremarkable  Impression: No acute intracranial abnormality  No large vessel occlusion  Moderate atherosclerotic disease with moderate stenosis of the cavernous right internal carotid artery and severe stenosis of the intracranial left vertebral artery  Focus of moderate stenosis at the proximal left M1 segment  Chronic truncation of the terminal left middle cerebral artery territory branches  Moderate stenosis of the proximal P1 segment of the left posterior cerebral artery  Mild to moderate atherosclerotic plaquing of the bilateral carotid bulbs causing no hemodynamically significant stenosis  I personally reported the findings to Sarah Sanon on 4/19/2022 at 6:44 AM  Workstation performed: [id number]     Echo complete w/ contrast if indicated    Result Date: 4/20/2022  Narrative: Navneet Givens  Left Ventricle: Left ventricular cavity size is normal  Wall thickness is moderately increased  The left ventricular ejection fraction is 65%  Systolic function is normal  Diastolic function is mildly abnormal, consistent with grade I (abnormal) relaxation  There is moderate to severe concentric hypertrophy     The following segments are hypokinetic: basal inferior and basal inferolateral    All other segments are normal    Right Ventricle: Right ventricular cavity size is mildly dilated  Systolic function is normal    Left Atrium: The atrium is mildly dilated    Right Atrium: The atrium is mildly dilated    Aortic Valve: The aortic valve is trileaflet  The leaflets are severely thickened  The leaflets are moderately calcified  There is moderately reduced mobility  There is mild regurgitation  There is moderate stenosis (FATUMA 1 2 cm2/MG 12 mmHg/DI 0 34)  The aortic valve velocity is increased due to stenosis    Mitral Valve: There is severe annular calcification  There is mild regurgitation    Aorta: The aortic root is mildly dilated at 4 1 cm  The ascending aorta is mildly dilated at 4 0 cm    Pericardium: There is a small pericardial effusion posterior to the heart  The fluid exhibits no internal echoes  NM myocardial perfusion spect (rx stress and/or rest)    Result Date: 4/14/2022  Narrative: Brendon Jalil  Stress ECG: A pharmacological stress test was performed using regadenoson  The patient experienced no angina during the test    Stress ECG: The stress ECG is negative for ischemia after pharmacologic stress    Perfusion: There is a left ventricular perfusion defect that is medium in size with severe reduction in uptake present in the basal to mid inferior and inferolateral location(s) consistent with prior scar    Stress Function: Left ventricular function post-stress is normal  Post-stress ejection fraction is 46 %  There is a defect in the basal to mid inferior and inferolateral location(s)    Stress Combined Conclusion: Findings are consistent with inferolateral infarction  Counseling / Coordination of Care  Total floor / unit time spent today 45 minutes  Greater than 50% of total time was spent with the patient and / or family counseling and / or coordination of care  A description of the counseling / coordination of care: Review of history, current assessment, development of a plan        Code Status: Level 1 - Full Code    ** Please Note: Dragon 360 Dictation voice to text software may have been used in the creation of this document   **

## 2022-04-30 NOTE — ASSESSMENT & PLAN NOTE
· Patient's presentation suggestive for hypertensive emergency  · POA blood pressure was 223/93  · Patient blood pressure slowly got improved  · Patient's presenting symptoms improved with improving blood pressure  · Most likely given this circumstances, patient's symptoms were due to severely increased blood pressure

## 2022-04-30 NOTE — PHYSICAL THERAPY NOTE
PHYSICAL THERAPY Evaluation    Performed at least 2 patient identifiers during session:  Patient Active Problem List   Diagnosis    Type 2 diabetes mellitus with chronic kidney disease on chronic dialysis, with long-term current use [facility]    Dizziness    Mixed hyperlipidemia    Nephrotic range proteinuria    Cognitive impairment    Elevated alkaline phosphatase level    Diabetic macular edema (HCC)    GERD (gastroesophageal reflux disease)    Diabetic polyneuropathy associated with type 2 diabetes mellitus (HCC)    Other constipation    Abnormal EEG    History of stroke    symptomatic hypoglycemia    Anxiety associated with depression    Urge incontinence    Overactive bladder    S/P arteriovenous (AV) fistula creation    Pre-kidney transplant, listed    Acute kidney injury superimposed on chronic kidney disease ([facility] )    Anemia    History of 2019 novel coronavirus disease (COVID-19)    ESRD on dialysis ([facility]    [facility]  )    Enteritis    Leukocytosis    Penetrating foot wound, left, initial encounter    Emotional lability    Elevated troponin    Hypertension    Right sided pain and paresthesias    Vertigo       Past Medical History:   Diagnosis Date    Cerebrovascular accident (CVA) due to thrombosis of left middle cerebral artery ([facility] ) 7/29/2018    Chronic kidney disease     Diabetes mellitus ([facility]     GERD (gastroesophageal reflux disease)     Hypercholesteremia     Hyperlipidemia     Hypertension     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    Neuropathy     Obesity     Osteomyelitis ([facility] )     last assessed 11/4/16    PVC's (premature ventricular contractions)     sees cardiology Dr Liliya camargo    Stroke [facility])     last weeof July 2018 [facility] [employer]    TIA (transient ischemic attack) 10/28/2018       Past Surgical History:   Procedure Laterality Date  ABDOMINAL SURGERY      CHOLECYSTECTOMY      Percutaneous    COLONOSCOPY      CYSTOSCOPY      OTHER SURGICAL HISTORY      "stimulator to control bowel movements"    GA ESOPHAGOGASTRODUODENOSCOPY TRANSORAL DIAGNOSTIC N/A 9/27/2016    Procedure: ESOPHAGOGASTRODUODENOSCOPY (EGD); Surgeon: Michelle Ling MD;  [facility]: [facility]; Service: Gastroenterology    GA LAP,CHOLECYSTECTOMY N/A 2/29/2016    Procedure: LAPAROSCOPIC CHOLECYSTECTOMY ;  Surgeon: Alexis Schaefer DO;  [facility];  Service: General    ROTATOR CUFF REPAIR Right     TOE AMPUTATION Right 10/28/2016    Procedure: 3RD TOE AMPUTATION ;  Surgeon: Oneida Garcia DPM;  [facility];  Service:         04/30/22 1139   PT Last Visit   PT Visit Date 04/30/22   Note Type   Note type Evaluation   Pain Assessment   Pain Assessment Tool 0-10   Pain Score No Pain   Restrictions/Precautions   Weight Bearing Precautions Per Order No   Home Living   Type of Home House   Home Layout Two level;Bed/bath upstairs   Additional Comments 3 steps between levels, pt able to stay on top level;  wife brings meals  Prior Function   Level of [place] Independent with ADLs and functional mobility; Needs assistance with IADLs   Lives With Spouse   Receives Help From Family   ADL Assistance Independent   IADLs Needs assistance   Comments Dialysis MWF, Wife works but does shopping/cooking/cleaning   General   Additional Pertinent History hx of CVA   Family/Caregiver Present Yes  (Wife)   Cognition   Overall Cognitive Status WFL   Arousal/Participation Alert   Orientation Level Oriented X4   Memory Within functional limits   Following Commands Follows all commands and directions without difficulty   Subjective   Subjective pt states he is feeling better   RUE Assessment   RUE Assessment WFL   LUE Assessment   LUE Assessment WFL   RLE Assessment   RLE Assessment WFL   LLE Assessment   LLE Assessment WFL   Coordination   Heel to Shin Intact   Rapid Alternating Movements Intact   Bed Mobility   Additional Comments pt OOB at start and end of PT session   Transfers   Sit to Stand 6  Modified independent   Stand to Sit 6  Modified independent   Stand pivot 6  Modified independent   Ambulation/Elevation   Gait pattern Wide JOSE DAVID; Decreased foot clearance; Ataxia   Gait Assistance 5  Supervision   Additional items Assist x 1   Assistive Device None   Distance 150ft, no LOB, fast lubna with increased path deviation; no LOB, pt states he is ambulating at baseline, confirmed by wife   Balance   Static Sitting Fair   Dynamic Sitting Fair   Static Standing Fair   Dynamic Standing Fair   Ambulatory Fair   Activity Tolerance   Activity Tolerance   (no adverse effects to PT noted)   Nurse Made Aware Millie Rinne   Assessment   Prognosis Fair   Assessment Pt is a pleasant 58 y o  male who presented to ED 4/29/22 with c/o acute onset of headache and dizziness; CVA alert  Dx:  CVA alert, dizziness, HTN (223/93), vertigo, elevated troponin; Comorbidities affecting pt's physical performance at time of assessment include: DM, ESRD on dialysis, HTN, HLD, prior CVA  PLOF and home set up listed above; Upon evaluation: Pt mod I for sit to stand, and mod I for ambulation without AD  Full objective findings from PT assessment regarding body systems outlined above  The following objective measures performed on IE also reveal no dizziness, pt denies vision changes or room spinning; Pt with coordination in BUE finger opposition WFL, BLE toe taps and heel to shin intact  Pt's clinical presentation is currently unstable/unpredictable seen in pt's presentation of continuous monitoring in hospital and medical complexity  Pt appears to be functioning at baseline for mobility, no needs for skilled PT;  Safe for DC home when medically ready; Will DC PT order;  Encourage ambulation TID and OOB for all meals  The patient's AM-PAC Basic Mobility Inpatient Short Form Raw Score is 24   A Raw score of greater than 17 suggests the patient may benefit from discharge to home  Please also refer to the recommendation of the Physical Therapist for safe discharge planning  Goals   Patient Goals to go home   Recommendation   PT Discharge Recommendation No rehabilitation needs   Additional Comments Pt appears to be functioning at baseline, no need for skilled PT;  encourage ambulation TID and OOB for all meals     AM-PAC Basic Mobility Inpatient   Turning in Bed Without Bedrails 4   Lying on Back to Sitting on Edge of Flat Bed 4   Moving Bed to Chair 4   Standing Up From Chair 4   Walk in Room 4   Climb 3-5 Stairs 4   Basic Mobility Inpatient Raw Score 24   Basic Mobility Standardized Score 57 68   Highest Level Of Mobility   JH-HLM Goal 8: Walk 250 feet or more   JH-HLM Highest Level of Mobility 7: Walk 25 feet or more   JH-HLM Goal Achieved No     Nayana Landa, PT      Patient Name: Venice Nieves  FNWUX'H Date: 4/30/2022

## 2022-04-30 NOTE — PROGRESS NOTES
NEUROLOGY RESIDENCY PROGRESS NOTE     Name: Koby Esteban   Age & Sex: 58 y o  male   MRN: [id number]  Unit/Bed#: S -01   Encounter: [id number]    ASSESSMENT & PLAN     Dizziness  Assessment & Plan  Koby Esteban is a 58 y o  male with history of diabetes mellitus,CKD on dialysis MWF, hypertension, hyperlipidemia, and left lacunar ischemic stroke (2018) who presented as stroke alert on 4/29/2022 10:09 AM with initial NIHSS of 0 and LKW 8:30 am 2 H prior to arrival who presents with dizziness and headache  Initial BP on arrival was Blood Pressure: (!) 223/93  As a result of nondisabling sx patient was determined to not be a candidate for tPA  BP over last 24 hours: Blood Pressure: 143/74  current BP: Blood Pressure: 143/74    Lab Results   Component Value Date    HGBA1C 6 0 (H) 04/19/2022    CHOLESTEROL 76 04/19/2022    LDLCALC 25 04/19/2022    TRIG 116 04/19/2022    INR 1 01 04/29/2022        Imaging:   CTH: No acute, stable chronic   CTA: No changes from previously noted severe intracranial stenosis, no acute findings   Repeat CTH from 4/30 - ordered    Impression: low suspicion for stroke given his physical exam however patient has multiple vascular risk factors including moderate/severe atherosclerosis of intracranial and extracranial vessels which does increase his risk  Current presentation likely secondary to hypertensive emergency (Baseline BP 130s-150s)      Plan:  · Strict blood pressure control, normotension today   Was recommended for MRI however pt has non functional spinal cord stimulator requiring rep to come in, as discussed with medicine team, repeat [address] today is appropriate instead   Continue home stroke prevention regimen   Do not need to repeat HBA1C, Lipid panel or echocardiogram    Maintain glucose below 200   Medical management as per primary team appreciated   After CTH today, no further inpatient workup, follow up with neurology as previously planned    Headache-resolved as of 2022  Assessment & Plan  Patient presents today with headache followed by dizziness in the setting of hypertensive emergency    Plan:  · Strict blood pressure management with goal 160-180  · Tylenol p r n  for headache   · Noel Kamara will need follow up in in 4 weeks with neurovascular attending, AP, resident  He will not require outpatient neurological testing  SUBJECTIVE     Patient was seen and examined  No acute events overnight  Changes to symptoms: improved back to baseline  Medication changes: none  PT/OT/rehab recs: none  Mood and sleep: no issues  No new imaging; will request CTH today instead of waiting for MRI   No remarkable labs  Stable VS    Review of Systems   Constitutional: Negative for chills and fever  HENT: Negative for ear pain and sore throat  Eyes: Negative for pain and visual disturbance  Respiratory: Negative for cough and shortness of breath  Cardiovascular: Negative for chest pain and palpitations  Gastrointestinal: Negative for abdominal pain and vomiting  Genitourinary: Negative for dysuria and hematuria  Musculoskeletal: Negative for arthralgias and back pain  Skin: Negative for color change and rash  Neurological: Negative for dizziness, seizures, syncope, weakness, light-headedness and headaches  All other systems reviewed and are negative  OBJECTIVE     Patient ID: Emma Dowd is a 58 y o  male      Vitals:    22 1745 22 1818 22 2312 22 0752   BP: 134/59 147/68 164/66 143/74   BP Location: Right arm Left arm Right arm Right arm   Pulse: 80 78 75 72   Resp: 18 18  20   Temp:  98 4 °F (36 9 °C) 98 3 °F (36 8 °C) 98 3 °F (36 8 °C)   TempSrc:  Oral Oral Oral   SpO2: 99% 98% 94% 96%   Weight:  107 kg (236 lb 1 8 oz)     Height:  5' 9" (1 753 m)        Temperature:   Temp (24hrs), Av °F (36 7 °C), Min:97 6 °F (36 4 °C), Max:98 4 °F (36 9 °C)    Temperature: 98 3 °F (36 8 °C)    Neurologic Exam     Mental Status   Oriented to person, place, and time  Attention: normal  Concentration: normal    Speech: speech is normal   Level of consciousness: alert  Knowledge: good  Able to name object  Able to read  Able to repeat  Normal comprehension  Cranial Nerves     CN III, IV, VI   Right pupil: Reactivity: brisk  Left pupil: Reactivity: brisk  CN V   Facial sensation intact  CN VII   Right facial weakness: central (chronic)    CN IX, X   CN IX normal    CN X normal      CN XI   CN XI normal      CN XII   CN XII normal      Motor Exam   Muscle bulk: normal  Overall muscle tone: normal  Right arm pronator drift: absent  Left arm pronator drift: absent    Strength   Strength 5/5 throughout  Sensory Exam   Light touch normal    Pinprick normal      Gait, Coordination, and Reflexes     Gait  Gait: normal    Reflexes   Reflexes 2+ except as noted  Right Erickson: absent  Left Erickson: absent  Right ankle clonus: absent  Left ankle clonus: absent      LABORATORY DATA     Labs: I have personally reviewed pertinent films in PACS  Results from last 7 days   Lab Units 04/30/22  0956 04/29/22  1015   WBC Thousand/uL 6 72 6 19   HEMOGLOBIN g/dL 11 1* 11 3*   HEMATOCRIT % 34 5* 35 7*   PLATELETS Thousands/uL 146* 162   NEUTROS PCT % 69  --    MONOS PCT % 8  --       Results from last 7 days   Lab Units 04/29/22  1015   POTASSIUM mmol/L 4 9   CHLORIDE mmol/L 103   CO2 mmol/L 25   BUN mg/dL 62*   CREATININE mg/dL 8 06*   CALCIUM mg/dL 9 1              Results from last 7 days   Lab Units 04/29/22  1015   INR  1 01   PTT seconds 32               IMAGING & DIAGNOSTIC TESTING     Radiology Results: I have personally reviewed pertinent films in PACS      Other Diagnostic Testing: I have personally reviewed pertinent reports        ACTIVE MEDICATIONS     Current Facility-Administered Medications   Medication Dose Route Frequency    acetaminophen (TYLENOL) tablet 650 mg  650 mg Oral Q6H PRN    aspirin (ECOTRIN LOW STRENGTH) EC tablet 81 mg  81 mg Oral Daily    atorvastatin (LIPITOR) tablet 40 mg  40 mg Oral QPM    calcium acetate (PHOSLO) capsule 1,334 mg  1,334 mg Oral TID With Meals    calcium carbonate (TUMS) chewable tablet 500 mg  500 mg Oral Daily PRN    carvedilol (COREG) tablet 6 25 mg  6 25 mg Oral BID With Meals    cholecalciferol (VITAMIN D3) tablet 1,000 Units  1,000 Units Oral Daily    doxercalciferol (HECTOROL) injection 7 mcg  7 mcg Intravenous Once per day on Mon Wed Fri    heparin (porcine) subcutaneous injection 5,000 Units  5,000 Units Subcutaneous Q8H [address]    insulin glargine (LANTUS) subcutaneous injection 14 Units 0 14 mL  14 Units Subcutaneous Daily    insulin lispro (HumaLOG) 100 units/mL subcutaneous injection 1-6 Units  1-6 Units Subcutaneous TID AC    insulin lispro (HumaLOG) 100 units/mL subcutaneous injection 4 Units  4 Units Subcutaneous TID With Meals    labetalol (NORMODYNE) injection 10 mg  10 mg Intravenous Q6H PRN    meclizine (ANTIVERT) tablet 25 mg  25 mg Oral Q8H PRN    nitroglycerin (NITROSTAT) SL tablet 0 4 mg  0 4 mg Sublingual Q5 Min PRN    torsemide (DEMADEX) tablet 10 mg  10 mg Oral Once per day on Mon Wed Fri    torsemide [facility]) tablet 50 mg  50 mg Oral Once per day on Sun Tue Thu Sat       Prior to Admission medications    Medication Sig Start Date End Date Taking?  Authorizing Provider   aluminum-magnesium hydroxide-simethicone (MYLANTA) 200-200-20 mg/5 mL suspension Take 30 mL by mouth every 4 (four) hours as needed for indigestion or heartburn 4/21/22   Jayme Valencia MD   aspirin (ECOTRIN LOW STRENGTH) 81 mg EC tablet Take 81 mg by mouth daily Resume on 8/14      Historical Provider, MD   atorvastatin (LIPITOR) 40 mg tablet Take 1 tablet (40 mg total) by mouth daily with dinner 12/13/21   Raj Salcido Other, DO   BD Pen Needle Adina U/F 32G X 4 MM MISC USE TO INJECT INSULIN 4 TIMES DAILY 5/19/21   Lissette Brennan PA-C   Blood Glucose Monitoring Suppl (True Metrix Meter) w/Device KIT Use to test blood sugars 3 times daily 3/28/22   Lissette Brennan PA-C   Blood Pressure Monitoring (BLOOD PRESSURE CUFF) MISC Use to check blood pressure before taking blood pressure medication and 1 hour after and follow instructions provided in discharge instructions based on the readings  8/13/18   Isac Dobbs MD   calcium acetate (CALPHRON) 667 mg TAKE 2 TABLETS BY MOUTH THREE TIMES DAILY WITH MEALS 10/17/21   Historical Provider, MD   calcium carbonate (TUMS) 500 mg chewable tablet Chew 1 tablet (500 mg total) daily as needed for indigestion or heartburn 4/21/22   Fletcher Libman, MD   carvedilol (COREG) 6 25 mg tablet Take 1 tablet (6 25 mg total) by mouth 2 (two) times a day with meals Or as directed by your kidney doctor 2/3/22   Scarlet Barahona, DO   Cholecalciferol (Vitamin D3) 1 25 MG (32141 UT) CAPS TAKE 1 CAPSULE BY MOUTH ONE TIME PER WEEK 10/6/21   Rom Gray MD   Continuous Blood Gluc  (DEXCOM G6 ) LANCE Use as directed for continuous glucose monitoring 10/28/19   Lissette Brennan PA-C   Continuous Blood Gluc Sensor (DEXCOM G6 SENSOR) MISC Use as directed for continuous glucose monitoring   Change every 10 days 10/28/19   Lissette Brennan PA-C   Continuous Blood Gluc Transmit (DEXCOM G6 TRANSMITTER) MISC Use as directed for continuous glucose monitoring-Change every 3 months 10/28/19   Lissette Brennan PA-C   doxercalciferol (HECTOROL) 0 5 mcg capsule 4 mcg 11/10/21 11/9/22  Historical Provider, MD   glucose blood (True Metrix Blood Glucose Test) test strip Use 1 each 3 (three) times a day Use as instructed 4/12/22   Chidi Garza MD   Incontinence Supplies (MALE URINAL) MISC by Does not apply route daily 9/24/18   Raj Auguste,    insulin lispro (HumaLOG KwikPen) 100 units/mL injection pen INJECT 4 UNITS 3 TIMES A DAY WITH MEALS PLUS SCALE (max daily dose 42 units) 4/7/22   Chidi Garza MD   Insulin Syringe-Needle U-100 (B-D INS SYRINGE 0 5CC/30GX1/2") 30G X 1/2" 0 5 ML MISC Inject under the skin 4 (four) times a day 6/5/20   Alejandra Chavez MD   Lancets Ultra Thin 30G MISC Use 3 times a day 3/28/22   Lissette Brennan PA-C   Lantus 100 UNIT/ML subcutaneous injection INJECT 14 UNITS UNDER THE SKIN DAILY 4/13/22   Lissette Brennan PA-C   meclizine (ANTIVERT) 25 mg tablet Take 1 tablet (25 mg total) by mouth every 8 (eight) hours as needed for dizziness or nausea 4/7/22   Jose A Mulligan MD   Nutritional Supplements (VITAMIN D BOOSTER PO) Take 0 5 mcg by mouth   6/21/21 6/20/22  Historical Provider, MD Balwinder Tomlinson LANCETS 22T MISC by Does not apply route 3 (three) times a day 11/27/18   Raj Auguste DO   TORSEMIDE PO Take 50 mg by mouth Pt takes 50 mg daily on non- dialysis days: sat, sun, Tues, and thrusday   Pt takes 10 mg of torsemide daily on dialysis days m- w- f   10/25/21   Historical Provider, MD       ==  MD Mahi Bryan [facility] Neurology Residency, PGY-3

## 2022-05-01 ENCOUNTER — APPOINTMENT (EMERGENCY)
Dept: RADIOLOGY | Facility: HOSPITAL | Age: 62
DRG: 246 | End: 2022-05-01
Payer: MEDICARE

## 2022-05-01 ENCOUNTER — HOSPITAL ENCOUNTER (INPATIENT)
Facility: HOSPITAL | Age: 62
LOS: 2 days | Discharge: HOME HEALTH | DRG: 246 | End: 2022-05-03
Attending: EMERGENCY MEDICINE | Admitting: INTERNAL MEDICINE
Payer: MEDICARE

## 2022-05-01 DIAGNOSIS — R07.9 CHEST PAIN: Primary | ICD-10-CM

## 2022-05-01 DIAGNOSIS — R79.89 ELEVATED D-DIMER: ICD-10-CM

## 2022-05-01 DIAGNOSIS — R77.8 ELEVATED TROPONIN: ICD-10-CM

## 2022-05-01 DIAGNOSIS — N18.9 CKD (CHRONIC KIDNEY DISEASE): ICD-10-CM

## 2022-05-01 LAB
2HR DELTA HS TROPONIN: -2 NG/L
4HR DELTA HS TROPONIN: 0 NG/L
ALBUMIN SERPL BCP-MCNC: 3.4 G/DL (ref 3.5–5)
ALBUMIN SERPL BCP-MCNC: 3.6 G/DL (ref 3.5–5)
ALP SERPL-CCNC: 113 U/L (ref 46–116)
ALP SERPL-CCNC: 126 U/L (ref 46–116)
ALT SERPL W P-5'-P-CCNC: 19 U/L (ref 12–78)
ALT SERPL W P-5'-P-CCNC: 20 U/L (ref 12–78)
ANION GAP SERPL CALCULATED.3IONS-SCNC: 11 MMOL/L (ref 4–13)
ANION GAP SERPL CALCULATED.3IONS-SCNC: 12 MMOL/L (ref 4–13)
AST SERPL W P-5'-P-CCNC: 8 U/L (ref 5–45)
AST SERPL W P-5'-P-CCNC: 8 U/L (ref 5–45)
ATRIAL RATE: 78 BPM
ATRIAL RATE: 79 BPM
ATRIAL RATE: 82 BPM
BASOPHILS # BLD AUTO: 0.02 THOUSANDS/ΜL (ref 0–0.1)
BASOPHILS # BLD AUTO: 0.04 THOUSANDS/ΜL (ref 0–0.1)
BASOPHILS NFR BLD AUTO: 0 % (ref 0–1)
BASOPHILS NFR BLD AUTO: 1 % (ref 0–1)
BILIRUB SERPL-MCNC: 0.37 MG/DL (ref 0.2–1)
BILIRUB SERPL-MCNC: 0.42 MG/DL (ref 0.2–1)
BUN SERPL-MCNC: 66 MG/DL (ref 5–25)
BUN SERPL-MCNC: 69 MG/DL (ref 5–25)
CALCIUM ALBUM COR SERPL-MCNC: 9.1 MG/DL (ref 8.3–10.1)
CALCIUM SERPL-MCNC: 8.6 MG/DL (ref 8.3–10.1)
CALCIUM SERPL-MCNC: 8.7 MG/DL (ref 8.3–10.1)
CARDIAC TROPONIN I PNL SERPL HS: 76 NG/L
CARDIAC TROPONIN I PNL SERPL HS: 78 NG/L
CARDIAC TROPONIN I PNL SERPL HS: 78 NG/L
CHLORIDE SERPL-SCNC: 100 MMOL/L (ref 100–108)
CHLORIDE SERPL-SCNC: 102 MMOL/L (ref 100–108)
CO2 SERPL-SCNC: 24 MMOL/L (ref 21–32)
CO2 SERPL-SCNC: 26 MMOL/L (ref 21–32)
CREAT SERPL-MCNC: 8.37 MG/DL (ref 0.6–1.3)
CREAT SERPL-MCNC: 8.66 MG/DL (ref 0.6–1.3)
D DIMER PPP FEU-MCNC: 2.03 UG/ML FEU
EOSINOPHIL # BLD AUTO: 0.12 THOUSAND/ΜL (ref 0–0.61)
EOSINOPHIL # BLD AUTO: 0.13 THOUSAND/ΜL (ref 0–0.61)
EOSINOPHIL NFR BLD AUTO: 2 % (ref 0–6)
EOSINOPHIL NFR BLD AUTO: 2 % (ref 0–6)
ERYTHROCYTE [DISTWIDTH] IN BLOOD BY AUTOMATED COUNT: 14.6 % (ref 11.6–15.1)
ERYTHROCYTE [DISTWIDTH] IN BLOOD BY AUTOMATED COUNT: 14.7 % (ref 11.6–15.1)
GFR SERPL CREATININE-BSD FRML MDRD: 5 ML/MIN/1.73SQ M
GFR SERPL CREATININE-BSD FRML MDRD: 6 ML/MIN/1.73SQ M
GLUCOSE SERPL-MCNC: 128 MG/DL (ref 65–140)
GLUCOSE SERPL-MCNC: 128 MG/DL (ref 65–140)
GLUCOSE SERPL-MCNC: 142 MG/DL (ref 65–140)
GLUCOSE SERPL-MCNC: 145 MG/DL (ref 65–140)
GLUCOSE SERPL-MCNC: 146 MG/DL (ref 65–140)
GLUCOSE SERPL-MCNC: 149 MG/DL (ref 65–140)
GLUCOSE SERPL-MCNC: 155 MG/DL (ref 65–140)
HCT VFR BLD AUTO: 31.8 % (ref 36.5–49.3)
HCT VFR BLD AUTO: 32.8 % (ref 36.5–49.3)
HGB BLD-MCNC: 10.2 G/DL (ref 12–17)
HGB BLD-MCNC: 10.5 G/DL (ref 12–17)
IMM GRANULOCYTES # BLD AUTO: 0.05 THOUSAND/UL (ref 0–0.2)
IMM GRANULOCYTES # BLD AUTO: 0.06 THOUSAND/UL (ref 0–0.2)
IMM GRANULOCYTES NFR BLD AUTO: 1 % (ref 0–2)
IMM GRANULOCYTES NFR BLD AUTO: 1 % (ref 0–2)
LYMPHOCYTES # BLD AUTO: 1.59 THOUSANDS/ΜL (ref 0.6–4.47)
LYMPHOCYTES # BLD AUTO: 1.66 THOUSANDS/ΜL (ref 0.6–4.47)
LYMPHOCYTES NFR BLD AUTO: 26 % (ref 14–44)
LYMPHOCYTES NFR BLD AUTO: 27 % (ref 14–44)
MAGNESIUM SERPL-MCNC: 2.2 MG/DL (ref 1.6–2.6)
MCH RBC QN AUTO: 30.8 PG (ref 26.8–34.3)
MCH RBC QN AUTO: 31.4 PG (ref 26.8–34.3)
MCHC RBC AUTO-ENTMCNC: 32 G/DL (ref 31.4–37.4)
MCHC RBC AUTO-ENTMCNC: 32.1 G/DL (ref 31.4–37.4)
MCV RBC AUTO: 96 FL (ref 82–98)
MCV RBC AUTO: 98 FL (ref 82–98)
MONOCYTES # BLD AUTO: 0.65 THOUSAND/ΜL (ref 0.17–1.22)
MONOCYTES # BLD AUTO: 0.68 THOUSAND/ΜL (ref 0.17–1.22)
MONOCYTES NFR BLD AUTO: 11 % (ref 4–12)
MONOCYTES NFR BLD AUTO: 11 % (ref 4–12)
NEUTROPHILS # BLD AUTO: 3.55 THOUSANDS/ΜL (ref 1.85–7.62)
NEUTROPHILS # BLD AUTO: 3.68 THOUSANDS/ΜL (ref 1.85–7.62)
NEUTS SEG NFR BLD AUTO: 59 % (ref 43–75)
NEUTS SEG NFR BLD AUTO: 59 % (ref 43–75)
NRBC BLD AUTO-RTO: 0 /100 WBCS
NRBC BLD AUTO-RTO: 0 /100 WBCS
P AXIS: 61 DEGREES
P AXIS: 70 DEGREES
P AXIS: 73 DEGREES
PHOSPHATE SERPL-MCNC: 6.4 MG/DL (ref 2.3–4.1)
PLATELET # BLD AUTO: 137 THOUSANDS/UL (ref 149–390)
PLATELET # BLD AUTO: 140 THOUSANDS/UL (ref 149–390)
PMV BLD AUTO: 10.5 FL (ref 8.9–12.7)
PMV BLD AUTO: 10.6 FL (ref 8.9–12.7)
POTASSIUM SERPL-SCNC: 4.5 MMOL/L (ref 3.5–5.3)
POTASSIUM SERPL-SCNC: 4.8 MMOL/L (ref 3.5–5.3)
PR INTERVAL: 174 MS
PR INTERVAL: 176 MS
PR INTERVAL: 184 MS
PROT SERPL-MCNC: 6.7 G/DL (ref 6.4–8.2)
PROT SERPL-MCNC: 7.1 G/DL (ref 6.4–8.2)
QRS AXIS: -7 DEGREES
QRS AXIS: -9 DEGREES
QRS AXIS: 11 DEGREES
QRSD INTERVAL: 156 MS
QT INTERVAL: 412 MS
QT INTERVAL: 416 MS
QT INTERVAL: 426 MS
QTC INTERVAL: 472 MS
QTC INTERVAL: 485 MS
QTC INTERVAL: 486 MS
RBC # BLD AUTO: 3.25 MILLION/UL (ref 3.88–5.62)
RBC # BLD AUTO: 3.41 MILLION/UL (ref 3.88–5.62)
SODIUM SERPL-SCNC: 136 MMOL/L (ref 136–145)
SODIUM SERPL-SCNC: 139 MMOL/L (ref 136–145)
T WAVE AXIS: -17 DEGREES
T WAVE AXIS: -18 DEGREES
T WAVE AXIS: 3 DEGREES
VENTRICULAR RATE: 78 BPM
VENTRICULAR RATE: 79 BPM
VENTRICULAR RATE: 82 BPM
WBC # BLD AUTO: 6.1 THOUSAND/UL (ref 4.31–10.16)
WBC # BLD AUTO: 6.13 THOUSAND/UL (ref 4.31–10.16)

## 2022-05-01 PROCEDURE — 93005 ELECTROCARDIOGRAM TRACING: CPT

## 2022-05-01 PROCEDURE — 82948 REAGENT STRIP/BLOOD GLUCOSE: CPT

## 2022-05-01 PROCEDURE — 99285 EMERGENCY DEPT VISIT HI MDM: CPT | Performed by: EMERGENCY MEDICINE

## 2022-05-01 PROCEDURE — 85025 COMPLETE CBC W/AUTO DIFF WBC: CPT | Performed by: PHYSICIAN ASSISTANT

## 2022-05-01 PROCEDURE — 71045 X-RAY EXAM CHEST 1 VIEW: CPT

## 2022-05-01 PROCEDURE — 85379 FIBRIN DEGRADATION QUANT: CPT | Performed by: EMERGENCY MEDICINE

## 2022-05-01 PROCEDURE — 84100 ASSAY OF PHOSPHORUS: CPT | Performed by: PHYSICIAN ASSISTANT

## 2022-05-01 PROCEDURE — 83735 ASSAY OF MAGNESIUM: CPT | Performed by: PHYSICIAN ASSISTANT

## 2022-05-01 PROCEDURE — 99232 SBSQ HOSP IP/OBS MODERATE 35: CPT | Performed by: INTERNAL MEDICINE

## 2022-05-01 PROCEDURE — 85025 COMPLETE CBC W/AUTO DIFF WBC: CPT | Performed by: EMERGENCY MEDICINE

## 2022-05-01 PROCEDURE — 93010 ELECTROCARDIOGRAM REPORT: CPT | Performed by: INTERNAL MEDICINE

## 2022-05-01 PROCEDURE — 99223 1ST HOSP IP/OBS HIGH 75: CPT | Performed by: INTERNAL MEDICINE

## 2022-05-01 PROCEDURE — NC001 PR NO CHARGE: Performed by: INTERNAL MEDICINE

## 2022-05-01 PROCEDURE — 36415 COLL VENOUS BLD VENIPUNCTURE: CPT | Performed by: EMERGENCY MEDICINE

## 2022-05-01 PROCEDURE — 80053 COMPREHEN METABOLIC PANEL: CPT | Performed by: PHYSICIAN ASSISTANT

## 2022-05-01 PROCEDURE — 80053 COMPREHEN METABOLIC PANEL: CPT | Performed by: EMERGENCY MEDICINE

## 2022-05-01 PROCEDURE — 99285 EMERGENCY DEPT VISIT HI MDM: CPT

## 2022-05-01 PROCEDURE — 84484 ASSAY OF TROPONIN QUANT: CPT | Performed by: EMERGENCY MEDICINE

## 2022-05-01 RX ORDER — ATORVASTATIN CALCIUM 40 MG/1
40 TABLET, FILM COATED ORAL
Status: DISCONTINUED | OUTPATIENT
Start: 2022-05-01 | End: 2022-05-03 | Stop reason: HOSPADM

## 2022-05-01 RX ORDER — CARVEDILOL 12.5 MG/1
12.5 TABLET ORAL 2 TIMES DAILY WITH MEALS
Status: DISCONTINUED | OUTPATIENT
Start: 2022-05-01 | End: 2022-05-03 | Stop reason: HOSPADM

## 2022-05-01 RX ORDER — INSULIN GLARGINE 100 [IU]/ML
14 INJECTION, SOLUTION SUBCUTANEOUS DAILY
Status: DISCONTINUED | OUTPATIENT
Start: 2022-05-01 | End: 2022-05-03 | Stop reason: HOSPADM

## 2022-05-01 RX ORDER — ASPIRIN 81 MG/1
81 TABLET ORAL DAILY
Status: DISCONTINUED | OUTPATIENT
Start: 2022-05-01 | End: 2022-05-03 | Stop reason: HOSPADM

## 2022-05-01 RX ORDER — MAGNESIUM HYDROXIDE/ALUMINUM HYDROXICE/SIMETHICONE 120; 1200; 1200 MG/30ML; MG/30ML; MG/30ML
30 SUSPENSION ORAL EVERY 4 HOURS PRN
Status: DISCONTINUED | OUTPATIENT
Start: 2022-05-01 | End: 2022-05-03 | Stop reason: HOSPADM

## 2022-05-01 RX ORDER — LORAZEPAM 0.5 MG/1
0.5 TABLET ORAL EVERY 12 HOURS PRN
Status: DISCONTINUED | OUTPATIENT
Start: 2022-05-01 | End: 2022-05-03 | Stop reason: HOSPADM

## 2022-05-01 RX ORDER — TORSEMIDE 10 MG/1
10 TABLET ORAL 3 TIMES WEEKLY
Status: DISCONTINUED | OUTPATIENT
Start: 2022-05-02 | End: 2022-05-03 | Stop reason: HOSPADM

## 2022-05-01 RX ORDER — INSULIN LISPRO 100 [IU]/ML
4 INJECTION, SOLUTION INTRAVENOUS; SUBCUTANEOUS
Status: DISCONTINUED | OUTPATIENT
Start: 2022-05-01 | End: 2022-05-03 | Stop reason: HOSPADM

## 2022-05-01 RX ORDER — INSULIN LISPRO 100 [IU]/ML
1-6 INJECTION, SOLUTION INTRAVENOUS; SUBCUTANEOUS
Status: DISCONTINUED | OUTPATIENT
Start: 2022-05-01 | End: 2022-05-03 | Stop reason: HOSPADM

## 2022-05-01 RX ORDER — HEPARIN SODIUM 5000 [USP'U]/ML
5000 INJECTION, SOLUTION INTRAVENOUS; SUBCUTANEOUS EVERY 8 HOURS SCHEDULED
Status: DISCONTINUED | OUTPATIENT
Start: 2022-05-01 | End: 2022-05-03 | Stop reason: HOSPADM

## 2022-05-01 RX ORDER — CALCIUM CARBONATE 200(500)MG
500 TABLET,CHEWABLE ORAL DAILY PRN
Status: DISCONTINUED | OUTPATIENT
Start: 2022-05-01 | End: 2022-05-03 | Stop reason: HOSPADM

## 2022-05-01 RX ORDER — ASPIRIN 325 MG
325 TABLET ORAL ONCE
Status: COMPLETED | OUTPATIENT
Start: 2022-05-01 | End: 2022-05-01

## 2022-05-01 RX ORDER — NITROGLYCERIN 0.4 MG/1
0.4 TABLET SUBLINGUAL ONCE
Status: COMPLETED | OUTPATIENT
Start: 2022-05-01 | End: 2022-05-01

## 2022-05-01 RX ORDER — CALCIUM ACETATE 667 MG/1
667 CAPSULE ORAL
Status: DISCONTINUED | OUTPATIENT
Start: 2022-05-01 | End: 2022-05-03 | Stop reason: HOSPADM

## 2022-05-01 RX ORDER — NITROGLYCERIN 0.4 MG/1
0.4 TABLET SUBLINGUAL ONCE
Status: DISCONTINUED | OUTPATIENT
Start: 2022-05-01 | End: 2022-05-03 | Stop reason: HOSPADM

## 2022-05-01 RX ORDER — MECLIZINE HYDROCHLORIDE 25 MG/1
25 TABLET ORAL EVERY 8 HOURS PRN
Status: DISCONTINUED | OUTPATIENT
Start: 2022-05-01 | End: 2022-05-03 | Stop reason: HOSPADM

## 2022-05-01 RX ORDER — ACETAMINOPHEN 325 MG/1
650 TABLET ORAL EVERY 6 HOURS PRN
Status: DISCONTINUED | OUTPATIENT
Start: 2022-05-01 | End: 2022-05-03 | Stop reason: HOSPADM

## 2022-05-01 RX ADMIN — HEPARIN SODIUM 5000 UNITS: 5000 INJECTION INTRAVENOUS; SUBCUTANEOUS at 07:52

## 2022-05-01 RX ADMIN — TORSEMIDE 50 MG: 20 TABLET ORAL at 08:02

## 2022-05-01 RX ADMIN — INSULIN GLARGINE 14 UNITS: 100 INJECTION, SOLUTION SUBCUTANEOUS at 08:06

## 2022-05-01 RX ADMIN — INSULIN LISPRO 4 UNITS: 100 INJECTION, SOLUTION INTRAVENOUS; SUBCUTANEOUS at 07:53

## 2022-05-01 RX ADMIN — CARVEDILOL 12.5 MG: 12.5 TABLET, FILM COATED ORAL at 16:15

## 2022-05-01 RX ADMIN — CALCIUM ACETATE 667 MG: 667 CAPSULE ORAL at 16:19

## 2022-05-01 RX ADMIN — ATORVASTATIN CALCIUM 40 MG: 40 TABLET, FILM COATED ORAL at 16:14

## 2022-05-01 RX ADMIN — NITROGLYCERIN 0.4 MG: 0.4 TABLET SUBLINGUAL at 03:50

## 2022-05-01 RX ADMIN — CARVEDILOL 12.5 MG: 12.5 TABLET, FILM COATED ORAL at 07:53

## 2022-05-01 RX ADMIN — INSULIN LISPRO 4 UNITS: 100 INJECTION, SOLUTION INTRAVENOUS; SUBCUTANEOUS at 16:20

## 2022-05-01 RX ADMIN — ASPIRIN 325 MG ORAL TABLET 325 MG: 325 PILL ORAL at 03:46

## 2022-05-01 RX ADMIN — HEPARIN SODIUM 5000 UNITS: 5000 INJECTION INTRAVENOUS; SUBCUTANEOUS at 16:18

## 2022-05-01 RX ADMIN — HEPARIN SODIUM 5000 UNITS: 5000 INJECTION INTRAVENOUS; SUBCUTANEOUS at 22:24

## 2022-05-01 NOTE — ASSESSMENT & PLAN NOTE
Patient presented with chest pain that was 8/10, constant, nonradiating sternal dull pressure  S/S improved with ASA and nitroglycerin  ECG showed NSR with HR of 84 and   Troponin level 78    Plan:  Continue ASA 81 mg p o  Daily  Nitroglycerin 0 4 mg SL p r n    Telemetry  Trend troponins

## 2022-05-01 NOTE — CONSULTS
Please see my progress note for today  Patient was discharged within the last 24 hours this is continuation of care for Nephrology for management of ESRD

## 2022-05-01 NOTE — ED NOTES
Pt utilizing callbell, reports urge to void  Pt stood at bedside, assisted with urinal  Pt placed back in stretcher, provided with blankets  Wife at bedside in [address]  Aware of pending admission         Coni Steele RN  05/01/22 4061

## 2022-05-01 NOTE — PROGRESS NOTES
NEPHROLOGY PROGRESS NOTE   Nuha Aceves 58 y o  male MRN: [id number]  Unit/Bed#: ED 6 Encounter: [id number]  Reason for Consult: ESRD      SUMMARY:    70-year-old male with a history of end-stage renal disease on hemodialysis, diabetes, hypertension, CVA who was recently discharged yesterday after initially presenting for headache and was admitted under the stroke protocol  He subsequently presents today for chest pain  Nephrology consult for ESRD management  He underwent his last dialysis treatment on Friday at [facility] and PLAN:    End-stage renal disease  --incenter hemodialysis at [facility] [place] Monday Wednesday Friday  --underwent his hemodialysis last on Friday at [facility] [place] [facility]  --active transplant list [facility]  --no indication for renal placement therapy today  --plan for next dialysis tomorrow     Hypertension  --blood pressure acceptable  --does not examine volume overload     Anemia of chronic kidney disease  --hemoglobin at target  --was sees BAYLEE as an outpatient which was on hold due to his hemoglobin being above target    Mineral bone disorder-chronic kidney disease  --Hectorol with dialysis  --renal diet      SUBJECTIVE / INTERVAL HISTORY:    No shortness of breath chest pain a little bit better today  Usually very extremely anxious  Wife at bedside    OBJECTIVE:  Current Weight:    Vitals:    05/01/22 0345 05/01/22 0400 05/01/22 0445 05/01/22 0600   BP: (!) 183/81 (!) 189/92 157/74 125/75   BP Location: Right arm Right arm Right arm Right arm   Pulse: 82 80 76 76   Resp: 18 18 18 16   Temp:       TempSrc:       SpO2: 99% 100% 99% 98%     No intake or output data in the 24 hours ending 05/01/22 0839    Review of Systems:    12 point ROS has been reviewed  Physical Exam  Vitals and nursing note reviewed  Constitutional:       General: He is not in acute distress  Appearance: He is well-developed  HENT:      Head: Normocephalic and atraumatic     Eyes: General: No scleral icterus  Conjunctiva/sclera: Conjunctivae normal       Pupils: Pupils are equal, round, and reactive to light  Cardiovascular:      Rate and Rhythm: Normal rate and regular rhythm  Heart sounds: S1 normal and S2 normal  No murmur heard  No friction rub  No gallop  Pulmonary:      Effort: Pulmonary effort is normal  No respiratory distress  Breath sounds: Normal breath sounds  No wheezing or rales  Abdominal:      General: Bowel sounds are normal       Palpations: Abdomen is soft  Tenderness: There is no abdominal tenderness  There is no rebound  Musculoskeletal:         General: Normal range of motion  Cervical back: Normal range of motion and neck supple  Skin:     Findings: No rash  Neurological:      Mental Status: He is alert and oriented to person, place, and time     Psychiatric:         Behavior: Behavior normal          Medications:    Current Facility-Administered Medications:     acetaminophen (TYLENOL) tablet 650 mg, 650 mg, Oral, Q6H PRN, Thu Zabala MD    aluminum-magnesium hydroxide-simethicone (MYLANTA) oral suspension 30 mL, 30 mL, Oral, Q4H PRN, Thu Zabala MD    aspirin (ECOTRIN LOW STRENGTH) EC tablet 81 mg, 81 mg, Oral, Daily, Thu Zabala MD    atorvastatin (LIPITOR) tablet 40 mg, 40 mg, Oral, Daily With Jennifer Payton MD    calcium acetate (PHOSLO) capsule 667 mg, 667 mg, Oral, TID With Meals, Thu Zabala MD    calcium carbonate (TUMS) chewable tablet 500 mg, 500 mg, Oral, Daily PRN, Thu Zabala MD    carvedilol (COREG) tablet 12 5 mg, 12 5 mg, Oral, BID With Meals, Thu Zabala MD, 12 5 mg at 05/01/22 0753    heparin (porcine) subcutaneous injection 5,000 Units, 5,000 Units, Subcutaneous, Q8H [facility], 5,000 Units at 05/01/22 0752 **AND** [CANCELED] Platelet count, , , Once, Thu Zabala MD    insulin glargine (LANTUS) subcutaneous injection 14 Units 0 14 mL, 14 Units, Subcutaneous, Daily, Jane Lewis MD, 14 Units at 05/01/22 0806    insulin lispro (HumaLOG) 100 units/mL subcutaneous injection 1-6 Units, 1-6 Units, Subcutaneous, TID AC **AND** Fingerstick Glucose (POCT), , , 4x Daily AC and at bedtime, Jane Lewis MD    insulin lispro (HumaLOG) 100 units/mL subcutaneous injection 4 Units, 4 Units, Subcutaneous, TID With Meals, Jane Lewis MD, 4 Units at 05/01/22 0753    meclizine (ANTIVERT) tablet 25 mg, 25 mg, Oral, Q8H PRN, Jane Lewis MD    nitroglycerin (NITROSTAT) SL tablet 0 4 mg, 0 4 mg, Sublingual, Once, MD Onel Yao  [facility] ON 5/2/2022] torsemide [facility]) tablet 10 mg, 10 mg, Oral, Once per day on Mon Wed Fri, Jane Lewis MD    torsemide [facility]) tablet 50 mg, 50 mg, Oral, Every Other Day, Jane Lewis MD, 50 mg at 05/01/22 0802    Current Outpatient Medications:     aluminum-magnesium hydroxide-simethicone (MYLANTA) 200-200-20 mg/5 mL suspension, Take 30 mL by mouth every 4 (four) hours as needed for indigestion or heartburn, Disp: 355 mL, Rfl: 0    aspirin (ECOTRIN LOW STRENGTH) 81 mg EC tablet, Take 81 mg by mouth daily Resume on 8/14  , Disp: , Rfl:     atorvastatin (LIPITOR) 40 mg tablet, Take 1 tablet (40 mg total) by mouth daily with dinner, Disp: 90 tablet, Rfl: 1    BD Pen Needle Adina U/F 32G X 4 MM MISC, USE TO INJECT INSULIN 4 TIMES DAILY, Disp: 400 each, Rfl: 1    Blood Glucose Monitoring Suppl (True Metrix Meter) w/Device KIT, Use to test blood sugars 3 times daily, Disp: 1 kit, Rfl: 0    Blood Pressure Monitoring (BLOOD PRESSURE CUFF) MISC, Use to check blood pressure before taking blood pressure medication and 1 hour after and follow instructions provided in discharge instructions based on the readings  , Disp: 1 each, Rfl: 0    calcium acetate (CALPHRON) 667 mg, TAKE 2 TABLETS BY MOUTH THREE TIMES DAILY WITH MEALS, Disp: , Rfl:     calcium carbonate (TUMS) 500 mg chewable tablet, Chew 1 tablet (500 mg total) daily as needed for indigestion or heartburn, Disp: , Rfl: 0    carvedilol (COREG) 12 5 mg tablet, Take 1 tablet (12 5 mg total) by mouth 2 (two) times a day with meals, Disp: 60 tablet, Rfl: 0    Cholecalciferol (Vitamin D3) 1 25 MG (26755 UT) CAPS, TAKE 1 CAPSULE BY MOUTH ONE TIME PER WEEK, Disp: 12 capsule, Rfl: 2    Continuous Blood Gluc  (DEXCOM G6 ) LANCE, Use as directed for continuous glucose monitoring, Disp: 1 Device, Rfl: 0    Continuous Blood Gluc Sensor (DEXCOM G6 SENSOR) MISC, Use as directed for continuous glucose monitoring   Change every 10 days, Disp: 1 each, Rfl: 11    Continuous Blood Gluc Transmit (DEXCOM G6 TRANSMITTER) MISC, Use as directed for continuous glucose monitoring-Change every 3 months, Disp: 1 each, Rfl: 3    doxercalciferol (HECTOROL) 0 5 mcg capsule, 4 mcg, Disp: , Rfl:     glucose blood (True Metrix Blood Glucose Test) test strip, Use 1 each 3 (three) times a day Use as instructed, Disp: 100 each, Rfl: 0    Incontinence Supplies (MALE URINAL) MISC, by Does not apply route daily, Disp: 6 each, Rfl: 3    insulin lispro (HumaLOG KwikPen) 100 units/mL injection pen, INJECT 4 UNITS 3 TIMES A DAY WITH MEALS PLUS SCALE (max daily dose 42 units), Disp: 45 mL, Rfl: 1    Insulin Syringe-Needle U-100 (B-D INS SYRINGE 0 5CC/30GX1/2") 30G X 1/2" 0 5 ML MISC, Inject under the skin 4 (four) times a day, Disp: 360 each, Rfl: 1    Lancets Ultra Thin 30G MISC, Use 3 times a day, Disp: 300 each, Rfl: 0    Lantus 100 UNIT/ML subcutaneous injection, INJECT 14 UNITS UNDER THE SKIN DAILY, Disp: 10 mL, Rfl: 0    meclizine (ANTIVERT) 25 mg tablet, Take 1 tablet (25 mg total) by mouth every 8 (eight) hours as needed for dizziness or nausea, Disp: 30 tablet, Rfl: 0    Nutritional Supplements (VITAMIN D BOOSTER PO), Take 0 5 mcg by mouth  , Disp: , Rfl:     ONETOUCH DELICA LANCETS 88L MISC, by Does not apply route 3 (three) times a day, Disp: 270 each, Rfl: 1    TORSEMIDE PO, Take 50 mg by mouth Pt takes 50 mg daily on non- dialysis days: sat, sun, Tues, and thrusday  Pt takes 10 mg of torsemide daily on dialysis days m- w- f  , Disp: , Rfl:     Laboratory Results:  Results from last 7 days   Lab Units 05/01/22  0609 05/01/22  0334 04/30/22  0956 04/30/22  0453 04/29/22  1015   WBC Thousand/uL 6 13 6 10 6 72  --  6 19   HEMOGLOBIN g/dL 10 2* 10 5* 11 1*  --  11 3*   HEMATOCRIT % 31 8* 32 8* 34 5*  --  35 7*   PLATELETS Thousands/uL 137* 140* 146*  --  162   POTASSIUM mmol/L 4 8 4 5  --  5 7* 4 9   CHLORIDE mmol/L 100 102  --  98* 103   CO2 mmol/L 24 26  --  17* 25   BUN mg/dL 66* 69*  --  52* 62*   CREATININE mg/dL 8 37* 8 66*  --  7 24* 8 06*   CALCIUM mg/dL 8 6 8 7  --  8 7 9 1   MAGNESIUM mg/dL 2 2  --   --   --   --    PHOSPHORUS mg/dL 6 4*  --   --   --   --        PLEASE NOTE:  This encounter was completed utilizing the [employer] Direct Speech Voice Recognition Software  Grammatical errors, random word insertions, pronoun errors and incomplete sentences are occasional consequences of the system due to software limitations, ambient noise and hardware issues  These may be missed by proof reading prior to affixing electronic signature  Any questions or concerns about the content, text or information contained within the body of this dictation should be directly addressed to the physician for clarification  Please do not hesitate to call me directly if you have any any questions or concerns

## 2022-05-01 NOTE — ASSESSMENT & PLAN NOTE
Recent Labs     04/30/22  0956 05/01/22  0334 05/01/22  0609   HGB 11 1* 10 5* 10 2*     At baseline  Plan:  Continue to monitor

## 2022-05-01 NOTE — ASSESSMENT & PLAN NOTE
Lab Results   Component Value Date    EGFR 5 05/01/2022    EGFR 7 04/30/2022    EGFR 6 04/29/2022    CREATININE 8 66 (H) 05/01/2022    CREATININE 7 24 (H) 04/30/2022    CREATININE 8 06 (H) 04/29/2022     HD on MWF  Plan:  Continue HD while inpatient  Monitor electrolytes and kidney function    Nephrology consulted

## 2022-05-01 NOTE — ED PROVIDER NOTES
History  Chief Complaint   Patient presents with    Chest Pain     chest pain, high blood pressure  He was just discharged yesterday and requested to leave  HPI     66-year-old male presenting to the emergency department with chest pain  Past medical history significant for hypertension, CKD, hyperlipidemia, CVA  Patient reports that approximately midnight, he was awoken from sleep with nonradiating midsternal 10/10 out pressure not associated with shortness of breath  Patient was recently admitted for chest pain and dizziness on 04/29 and was discharged on 04/30 after patient started to feel better  Pain had resolved until this episode tonight  Patient denies fever, shortness of breath, palpitations, nausea, vomiting, abdominal pain, urinary symptoms, changes in stool, leg pain or leg swelling, rash  Patient reports that during his admission a cardiac catheterization was considered by his cardiologist, however the patient declined  Prior to Admission Medications   Prescriptions Last Dose Informant Patient Reported? Taking? BD Pen Needle Adian U/F 32G X 4 MM MISC  Spouse/Significant Other No No   Sig: USE TO INJECT INSULIN 4 TIMES DAILY   Blood Glucose Monitoring Suppl (True Metrix Meter) w/Device KIT  Spouse/Significant Other No No   Sig: Use to test blood sugars 3 times daily   Blood Pressure Monitoring (BLOOD PRESSURE CUFF) MISC  Spouse/Significant Other No No   Sig: Use to check blood pressure before taking blood pressure medication and 1 hour after and follow instructions provided in discharge instructions based on the readings     Cholecalciferol (Vitamin D3) 1 25 MG (69069 UT) CAPS  Spouse/Significant Other No No   Sig: TAKE 1 CAPSULE BY MOUTH ONE TIME PER WEEK   Continuous Blood Gluc  (DEXCOM G6 ) LANCE  Spouse/Significant Other No No   Sig: Use as directed for continuous glucose monitoring   Continuous Blood Gluc Sensor (DEXCOM G6 SENSOR) MISC  Spouse/Significant Other No No Sig: Use as directed for continuous glucose monitoring  Change every 10 days   Continuous Blood Gluc Transmit (DEXCOM G6 TRANSMITTER) MISC  Spouse/Significant Other No No   Sig: Use as directed for continuous glucose monitoring-Change every 3 months   Incontinence Supplies (MALE URINAL) MISC  Spouse/Significant Other No No   Sig: by Does not apply route daily   Insulin Syringe-Needle U-100 (B-D INS SYRINGE 0 5CC/30GX1/2") 30G X 1/2" 0 5 ML MISC  Spouse/Significant Other No No   Sig: Inject under the skin 4 (four) times a day   Lancets Ultra Thin 30G MISC  Spouse/Significant Other No No   Sig: Use 3 times a day   Lantus 100 UNIT/ML subcutaneous injection   No No   Sig: INJECT 14 UNITS UNDER THE SKIN DAILY   Nutritional Supplements (VITAMIN D BOOSTER PO)  Spouse/Significant Other Yes No   Sig: Take 0 5 mcg by mouth     ONETOUCH DELICA LANCETS 40P MISC  Spouse/Significant Other No No   Sig: by Does not apply route 3 (three) times a day   TORSEMIDE PO  Spouse/Significant Other Yes No   Sig: Take 50 mg by mouth Pt takes 50 mg daily on non- dialysis days: sat, sun, Tues, and thrusday   Pt takes 10 mg of torsemide daily on dialysis days m- w- f     aluminum-magnesium hydroxide-simethicone (MYLANTA) 200-200-20 mg/5 mL suspension   No No   Sig: Take 30 mL by mouth every 4 (four) hours as needed for indigestion or heartburn   aspirin (ECOTRIN LOW STRENGTH) 81 mg EC tablet  Spouse/Significant Other Yes No   Sig: Take 81 mg by mouth daily Resume on 8/14     atorvastatin (LIPITOR) 40 mg tablet  Spouse/Significant Other No No   Sig: Take 1 tablet (40 mg total) by mouth daily with dinner   calcium acetate (CALPHRON) 667 mg  Spouse/Significant Other Yes No   Sig: TAKE 2 TABLETS BY MOUTH THREE TIMES DAILY WITH MEALS   calcium carbonate (TUMS) 500 mg chewable tablet   No No   Sig: Chew 1 tablet (500 mg total) daily as needed for indigestion or heartburn   carvedilol (COREG) 12 5 mg tablet   No No   Sig: Take 1 tablet (12 5 mg total) by mouth 2 (two) times a day with meals   doxercalciferol (HECTOROL) 0 5 mcg capsule  Spouse/Significant Other Yes No   Si mcg   glucose blood (True Metrix Blood Glucose Test) test strip   No No   Sig: Use 1 each 3 (three) times a day Use as instructed   insulin lispro (HumaLOG KwikPen) 100 units/mL injection pen  Spouse/Significant Other No No   Sig: INJECT 4 UNITS 3 TIMES A DAY WITH MEALS PLUS SCALE (max daily dose 42 units)   meclizine (ANTIVERT) 25 mg tablet  Spouse/Significant Other No No   Sig: Take 1 tablet (25 mg total) by mouth every 8 (eight) hours as needed for dizziness or nausea      Facility-Administered Medications: None       Past Medical History:   Diagnosis Date    Cerebrovascular accident (CVA) due to thrombosis of left middle cerebral artery ([facility] ) 2018    Chronic kidney disease     Diabetes mellitus ([facility] )     GERD (gastroesophageal reflux disease)     Hypercholesteremia     Hyperlipidemia     Hypertension     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    Neuropathy     Obesity     Osteomyelitis ([facility] )     last assessed 16    PVC's (premature ventricular contractions)     sees cardiology Dr Godfrey Cedillo [facility]    Stroke [facility])     last weeof 2018 [employer] [place]    TIA (transient ischemic attack) 10/28/2018       Past Surgical History:   Procedure Laterality Date    ABDOMINAL SURGERY      CHOLECYSTECTOMY      Percutaneous    COLONOSCOPY      CYSTOSCOPY      OTHER SURGICAL HISTORY      "stimulator to control bowel movements"    HI ESOPHAGOGASTRODUODENOSCOPY TRANSORAL DIAGNOSTIC N/A 2016    Procedure: ESOPHAGOGASTRODUODENOSCOPY (EGD); Surgeon: Jill Alcocer MD;  [facility];   Service: Gastroenterology    HI LAP,CHOLECYSTECTOMY N/A 2016    Procedure: LAPAROSCOPIC CHOLECYSTECTOMY ;  Surgeon: Christine Cao DO;  [facility];  Service: General    ROTATOR CUFF REPAIR Right     TOE AMPUTATION Right 10/28/2016    Procedure: 3RD TOE AMPUTATION ;  Surgeon: James Rai DPM;  [facility];  Service:        Family History   Problem Relation Age of Onset    Leukemia Mother     Liver disease Mother     Lung cancer Mother         heavy smoker - 3 ppd    Heart disease Father     Liver disease Father     Multiple myeloma Sister     Breast cancer Sister     Urolithiasis Family     Alcohol abuse Neg Hx     Depression Neg Hx     Drug abuse Neg Hx     Substance Abuse Neg Hx     Mental illness Neg Hx      I have reviewed and agree with the history as documented  E-Cigarette/Vaping    E-Cigarette Use Never User      E-Cigarette/Vaping Substances    Nicotine No     THC No     CBD No     Flavoring No     Other No     Unknown No      Social History     Tobacco Use    Smoking status: Never Smoker    Smokeless tobacco: Never Used   Vaping Use    Vaping Use: Never used   Substance Use Topics    Alcohol use: Not Currently    Drug use: No       Review of Systems   Constitutional: Negative for activity change, chills and fever  HENT: Negative for sneezing and sore throat  Eyes: Negative for pain  Respiratory: Negative for apnea, cough, choking, chest tightness, shortness of breath, wheezing and stridor  Cardiovascular: Positive for chest pain  Negative for palpitations and leg swelling  Gastrointestinal: Negative for abdominal distention, abdominal pain, anal bleeding, blood in stool, constipation, diarrhea, nausea, rectal pain and vomiting  Genitourinary: Negative for dysuria and hematuria  Musculoskeletal: Negative for back pain, gait problem, myalgias, neck pain and neck stiffness  Skin: Negative for color change, pallor, rash and wound  Neurological: Negative for dizziness, tremors, seizures, syncope, facial asymmetry, speech difficulty, weakness, light-headedness, numbness and headaches  Physical Exam  Physical Exam  Vitals and nursing note reviewed     Constitutional:       General: He is in acute distress  Appearance: He is well-developed  He is not ill-appearing, toxic-appearing or diaphoretic  HENT:      Head: Normocephalic and atraumatic  Right Ear: External ear normal       Left Ear: External ear normal       Nose: Nose normal    Eyes:      General:         Right eye: No discharge  Left eye: No discharge  Extraocular Movements: Extraocular movements intact  Conjunctiva/sclera: Conjunctivae normal       Pupils: Pupils are equal, round, and reactive to light  Cardiovascular:      Rate and Rhythm: Normal rate and regular rhythm  Heart sounds: Normal heart sounds  No friction rub  No gallop  Pulmonary:      Effort: Pulmonary effort is normal  No respiratory distress  Breath sounds: Normal breath sounds  No stridor  No decreased breath sounds, wheezing, rhonchi or rales  Chest:      Chest wall: No tenderness  Abdominal:      General: Bowel sounds are normal       Palpations: Abdomen is soft  Tenderness: There is no abdominal tenderness  There is no guarding  Musculoskeletal:         General: No tenderness or deformity  Normal range of motion  Cervical back: Normal range of motion and neck supple  Right lower leg: No tenderness  No edema  Left lower leg: No tenderness  No edema  Skin:     General: Skin is warm and dry  Capillary Refill: Capillary refill takes less than 2 seconds  Neurological:      Mental Status: He is alert and oriented to person, place, and time     Psychiatric:         Mood and Affect: Mood normal          Vital Signs  ED Triage Vitals   Temperature Pulse Respirations Blood Pressure SpO2   05/01/22 0308 05/01/22 0308 05/01/22 0308 05/01/22 0308 05/01/22 0308   98 2 °F (36 8 °C) 82 20 151/70 97 %      Temp Source Heart Rate Source Patient Position - Orthostatic VS BP Location FiO2 (%)   05/01/22 0308 05/01/22 0308 05/01/22 0308 05/01/22 0345 --   Oral Monitor Sitting Right arm       Pain Score       05/01/22 0308       8           Vitals:    05/01/22 0345 05/01/22 0400 05/01/22 0445 05/01/22 0600   BP: (!) 183/81 (!) 189/92 157/74 125/75   Pulse: 82 80 76 76   Patient Position - Orthostatic VS: Lying Lying Lying Lying         Visual Acuity      ED Medications  Medications   aluminum-magnesium hydroxide-simethicone (MYLANTA) oral suspension 30 mL (has no administration in time range)   aspirin (ECOTRIN LOW STRENGTH) EC tablet 81 mg (has no administration in time range)   atorvastatin (LIPITOR) tablet 40 mg (has no administration in time range)   calcium acetate (PHOSLO) capsule 667 mg (has no administration in time range)   calcium carbonate (TUMS) chewable tablet 500 mg (has no administration in time range)   carvedilol (COREG) tablet 12 5 mg (has no administration in time range)   insulin lispro (HumaLOG) 100 units/mL subcutaneous injection 4 Units (has no administration in time range)   insulin glargine (LANTUS) subcutaneous injection 14 Units 0 14 mL (has no administration in time range)   meclizine (ANTIVERT) tablet 25 mg (has no administration in time range)   torsemide (DEMADEX) tablet 50 mg (has no administration in time range)   torsemide (DEMADEX) tablet 10 mg (has no administration in time range)   acetaminophen (TYLENOL) tablet 650 mg (has no administration in time range)   heparin (porcine) subcutaneous injection 5,000 Units (has no administration in time range)   insulin lispro (HumaLOG) 100 units/mL subcutaneous injection 1-6 Units (has no administration in time range)   nitroglycerin (NITROSTAT) SL tablet 0 4 mg (0 4 mg Sublingual Not Given 5/1/22 0737)   aspirin tablet 325 mg (325 mg Oral Given 5/1/22 0346)   nitroglycerin (NITROSTAT) SL tablet 0 4 mg (0 4 mg Sublingual Given 5/1/22 0350)       Diagnostic Studies  Results Reviewed     Procedure Component Value Units Date/Time    Comprehensive metabolic panel [[id number]]  (Abnormal) Collected: 05/01/22 0609    Lab Status: Final result Specimen: Blood from Arm, Right Updated: 05/01/22 0655     Sodium 136 mmol/L      Potassium 4 8 mmol/L      Chloride 100 mmol/L      CO2 24 mmol/L      ANION GAP 12 mmol/L      BUN 66 mg/dL      Creatinine 8 37 mg/dL      Glucose 128 mg/dL      Calcium 8 6 mg/dL      Corrected Calcium 9 1 mg/dL      AST 8 U/L      ALT 19 U/L      Alkaline Phosphatase 113 U/L      Total Protein 6 7 g/dL      Albumin 3 4 g/dL      Total Bilirubin 0 37 mg/dL      eGFR 6 ml/min/1 73sq m     Narrative:      National Kidney Disease Foundation guidelines for Chronic Kidney Disease (CKD):     Stage 1 with normal or high GFR (GFR > 90 mL/min/1 73 square meters)    Stage 2 Mild CKD (GFR = 60-89 mL/min/1 73 square meters)    Stage 3A Moderate CKD (GFR = 45-59 mL/min/1 73 square meters)    Stage 3B Moderate CKD (GFR = 30-44 mL/min/1 73 square meters)    Stage 4 Severe CKD (GFR = 15-29 mL/min/1 73 square meters)    Stage 5 End Stage CKD (GFR <15 mL/min/1 73 square meters)  Note: GFR calculation is accurate only with a steady state creatinine    Phosphorus [[id number]]  (Abnormal) Collected: 05/01/22 0609    Lab Status: Final result Specimen: Blood from Arm, Right Updated: 05/01/22 0655     Phosphorus 6 4 mg/dL     Magnesium [[id number]]  (Normal) Collected: 05/01/22 0609    Lab Status: Final result Specimen: Blood from Arm, Right Updated: 05/01/22 0655     Magnesium 2 2 mg/dL     HS Troponin I 2hr [[id number]]  (Abnormal) Collected: 05/01/22 0609    Lab Status: Final result Specimen: Blood from Arm, Right Updated: 05/01/22 0646     hs TnI 2hr 76 ng/L      Delta 2hr hsTnI -2 ng/L     CBC and differential [[id number]]  (Abnormal) Collected: 05/01/22 0609    Lab Status: Final result Specimen: Blood from Arm, Right Updated: 05/01/22 0620     WBC 6 13 Thousand/uL      RBC 3 25 Million/uL      Hemoglobin 10 2 g/dL      Hematocrit 31 8 %      MCV 98 fL      MCH 31 4 pg      MCHC 32 1 g/dL      RDW 14 6 %      MPV 10 5 fL      Platelets 214 Thousands/uL      nRBC 0 /100 WBCs      Neutrophils Relative 59 %      Immat GRANS % 1 %      Lymphocytes Relative 26 %      Monocytes Relative 11 %      Eosinophils Relative 2 %      Basophils Relative 1 %      Neutrophils Absolute 3 68 Thousands/µL      Immature Grans Absolute 0 05 Thousand/uL      Lymphocytes Absolute 1 59 Thousands/µL      Monocytes Absolute 0 65 Thousand/µL      Eosinophils Absolute 0 12 Thousand/µL      Basophils Absolute 0 04 Thousands/µL     Comprehensive metabolic panel [[id number]]  (Abnormal) Collected: 05/01/22 0334    Lab Status: Final result Specimen: Blood from Arm, Right Updated: 05/01/22 0446     Sodium 139 mmol/L      Potassium 4 5 mmol/L      Chloride 102 mmol/L      CO2 26 mmol/L      ANION GAP 11 mmol/L      BUN 69 mg/dL      Creatinine 8 66 mg/dL      Glucose 146 mg/dL      Calcium 8 7 mg/dL      AST 8 U/L      ALT 20 U/L      Alkaline Phosphatase 126 U/L      Total Protein 7 1 g/dL      Albumin 3 6 g/dL      Total Bilirubin 0 42 mg/dL      eGFR 5 ml/min/1 73sq m     Narrative:      National Kidney Disease Foundation guidelines for Chronic Kidney Disease (CKD):     Stage 1 with normal or high GFR (GFR > 90 mL/min/1 73 square meters)    Stage 2 Mild CKD (GFR = 60-89 mL/min/1 73 square meters)    Stage 3A Moderate CKD (GFR = 45-59 mL/min/1 73 square meters)    Stage 3B Moderate CKD (GFR = 30-44 mL/min/1 73 square meters)    Stage 4 Severe CKD (GFR = 15-29 mL/min/1 73 square meters)    Stage 5 End Stage CKD (GFR <15 mL/min/1 73 square meters)  Note: GFR calculation is accurate only with a steady state creatinine    HS Troponin I 4hr [[id number]]     Lab Status: No result Specimen: Blood     HS Troponin 0hr (reflex protocol) [[id number]]  (Abnormal) Collected: 05/01/22 0334    Lab Status: Final result Specimen: Blood from Arm, Right Updated: 05/01/22 0419     hs TnI 0hr 78 ng/L     D-dimer, quantitative [[id number]]  (Abnormal) Collected: 05/01/22 0355    Lab Status: Final result Specimen: Blood Updated: 05/01/22 0414     D-Dimer, Quant 2 03 ug/ml FEU     Narrative: In the evaluation for possible pulmonary embolism, in the appropriate (Well's Score of 4 or less) patient, the age adjusted d-dimer cutoff for this patient can be calculated as:    Age x 0 01 (in ug/mL) for Age-adjusted D-dimer exclusion threshold for a patient over 50 years  CBC and differential [[id number]]  (Abnormal) Collected: 05/01/22 0334    Lab Status: Final result Specimen: Blood from Arm, Right Updated: 05/01/22 0352     WBC 6 10 Thousand/uL      RBC 3 41 Million/uL      Hemoglobin 10 5 g/dL      Hematocrit 32 8 %      MCV 96 fL      MCH 30 8 pg      MCHC 32 0 g/dL      RDW 14 7 %      MPV 10 6 fL      Platelets 332 Thousands/uL      nRBC 0 /100 WBCs      Neutrophils Relative 59 %      Immat GRANS % 1 %      Lymphocytes Relative 27 %      Monocytes Relative 11 %      Eosinophils Relative 2 %      Basophils Relative 0 %      Neutrophils Absolute 3 55 Thousands/µL      Immature Grans Absolute 0 06 Thousand/uL      Lymphocytes Absolute 1 66 Thousands/µL      Monocytes Absolute 0 68 Thousand/µL      Eosinophils Absolute 0 13 Thousand/µL      Basophils Absolute 0 02 Thousands/µL     Fingerstick Glucose (POCT) [[id number]]  (Abnormal) Collected: 05/01/22 0308    Lab Status: Final result Updated: 05/01/22 0314     POC Glucose 155 mg/dl                  XR chest 1 view portable    (Results Pending)              Procedures  Procedures         ED Course  ED Course as of 05/01/22 0743   Sun May 01, 2022   [address] [place] [address] 70-year-old male presenting to the emergency department with acute onset of chest pain midnight that will come up from sleep  Vital signs on arrival notable for hypertension  Physical exam is remarkable for uncomfortable appearing man with no specific findings and physical exam     Differential diagnosis includes ACS, pneumonia, PE, pleural effusion, pneumothorax, pericarditis, myocarditis, cholecystitis, cholelithiasis      Lab work and imaging was ordered  EKG showed no signs of STEMI, however right bundle is noted  V2 showed signs of saddle back ST segment, however repeat EKG showed no findings other than right bundle  Initial troponin was elevated at 78  Patient has CKD, however this is slightly more elevated than previous  Patient was given aspirin and nitro for his chest pain  On reassessment, his pain improved, however given his significant comorbidities and suspicious clinical history, case was discussed with General Medicine for admission  MDM    Disposition  Final diagnoses:   Chest pain   Elevated troponin   Elevated d-dimer   CKD (chronic kidney disease)     Time reflects when diagnosis was documented in both MDM as applicable and the Disposition within this note     Time User Action Codes Description Comment    5/1/2022  5:29 AM Bora Fried Add [R07 9] Chest pain     5/1/2022  5:29 AM Bora Fried Add [R77 8] Elevated troponin     5/1/2022  5:29 AM Bora Fried Add [R79 89] Elevated d-dimer     5/1/2022  5:29 AM Bora Fried Add [N18 9] CKD (chronic kidney disease)       ED Disposition     ED Disposition Condition Date/Time Comment    Admit Stable Sun May 1, 2022  5:29 AM Case was discussed with resident and the patient's admission status was agreed to be Admission Status: inpatient status to the service of Dr Tru David   Follow-up Information    None         Patient's Medications   Discharge Prescriptions    No medications on file       No discharge procedures on file      PDMP Review       Value Time User    PDMP Reviewed  Yes 12/30/2020 10:40 PM Elena Fallon MD          ED Provider  Electronically Signed by           Connor Gaona MD  05/01/22 3520

## 2022-05-01 NOTE — ASSESSMENT & PLAN NOTE
Blood Pressure: 125/75    BP was initially elevated when presenting to the ED at 189/92  Home regimen includes Coreg 12 5 mg p o  B i d , torsemide 50 mg p o  Daily on non dialysis days, torsemide 10 mg p o  Daily on dialysis days MWF  Plan:  Continue Coreg 12 mg p o  B i d   Continue torsemide 50 mg p o  Daily on nondialysis days  Continue torsemide 10 mg p o  Daily on dialysis days MWF  Monitor BP qshift

## 2022-05-01 NOTE — ED NOTES
Pt reports pain reduction from 5/10 to 3/10 s/p nitro and ambulation to restroom          Whitney Brooks RN  05/01/22 0968

## 2022-05-01 NOTE — ED NOTES
Pt sat upright on side of bed  Able to keep balance and hold self up  Meal tray provided  Pt ate 100% of meal  Morning med pass completed  Pt continues to ask when room is available          Severa Larve, RN  05/01/22 8528

## 2022-05-01 NOTE — ASSESSMENT & PLAN NOTE
Lab Results   Component Value Date    HGBA1C 6 0 (H) 04/30/2022       Recent Labs     04/29/22  1036 04/30/22  0756 04/30/22  1131 05/01/22  0308   POCGLU 144* 135 145* 155*       Blood Sugar Average: Last 72 hrs:  (P) 155     Home regimen includes Lantus 14 units SC q h s  lispro 4 units SC t i d  With meals and SSI  Plan:  · Continue Lantus 14 units qhs  · Lispro 4 units t i d  With meals  · SSI  · Hypoglycemia protocol  · POCT glucose checks with meals and before bedtime    · Diabetic diet

## 2022-05-01 NOTE — ED NOTES
Pt family utilizing pearl  Reports pt is refusing to finish PO medication  Pt continues to spit out torsemide  2 pills of 3 visible in food product spat from pts mouth  Pts wife at bedside attempting to convince pt to take medication  This RN asses why pt is unable to take meds and pt continues to state "I just cant"  This RN made pt aware that this RN has taken care of pt many times, and has seen progression of pts health over time  Made pt aware that he is the only one that is in control of his health, reminded pt that he voices fear of dying and that he wishes to be around for marriages of children and future family  Advised to patient that his family cares for him as evidence by them being here for him  Advised pt to take pills as ordered for his health but ultimately it is up to him  This RN assess psycho-social, asked pt if he has thoughts of dying  Pt responds yes  Per wife this has not been known to her  Pt tearful at this time  Validated pts concerns and feelings, advised again he is in control of his health  Ultimately pt unwilling to finish dose of torsemide  SLIM made aware of conversation and medication        Maris Orosco, RN  05/01/22 1687

## 2022-05-01 NOTE — H&P
Bettie Toledo [address] 1960, 58 y o  male MRN: [id number]  Unit/Bed#: ED 11 Encounter: [id number]  Primary Care Provider: Gary Case DO   Date and time admitted to hospital: 5/1/2022  3:03 AM    * Chest pain  Assessment & Plan  Patient presented with chest pain that was 8/10, constant, nonradiating sternal dull pressure  S/S improved with ASA and nitroglycerin  ECG showed NSR with HR of 84 and   Troponin level 78    Plan:  Continue ASA 81 mg p o  Daily  Nitroglycerin 0 4 mg SL p r n  Telemetry  Trend troponins    Hypertension  Assessment & Plan  Blood Pressure: 125/75    BP was initially elevated when presenting to the ED at 189/92  Home regimen includes Coreg 12 5 mg p o  B i d , torsemide 50 mg p o  Daily on non dialysis days, torsemide 10 mg p o  Daily on dialysis days MWF  Plan:  Continue Coreg 12 mg p o  B i d   Continue torsemide 50 mg p o  Daily on nondialysis days  Continue torsemide 10 mg p o  Daily on dialysis days MWF  Monitor BP qshift  ESRD on dialysis [facility])  Assessment & Plan  Lab Results   Component Value Date    EGFR 5 05/01/2022    EGFR 7 04/30/2022    EGFR 6 04/29/2022    CREATININE 8 66 (H) 05/01/2022    CREATININE 7 24 (H) 04/30/2022    CREATININE 8 06 (H) 04/29/2022     HD on MWF  Plan:  Continue HD while inpatient  Monitor electrolytes and kidney function  Nephrology consulted      Anemia  Assessment & Plan  Recent Labs     04/30/22  0956 05/01/22  0334 05/01/22  0609   HGB 11 1* 10 5* 10 2*     At baseline  Plan:  Continue to monitor  GERD (gastroesophageal reflux disease)  Assessment & Plan  Plan:  Continue Mylanta and Tums prn    Mixed hyperlipidemia  Assessment & Plan  Continue Lipitor 40 mg p o   Daily D with dinner    Type 2 diabetes mellitus with chronic kidney disease on chronic dialysis, with long-term current use of insulin [facility])  Assessment & Plan  Lab Results   Component Value Date    HGBA1C 6 0 (H) 04/30/2022 Recent Labs     04/29/22  1036 04/30/22  0756 04/30/22  1131 05/01/22  0308   POCGLU 144* 135 145* 155*       Blood Sugar Average: Last 72 hrs:  (P) 155     Home regimen includes Lantus 14 units SC q h s  lispro 4 units SC t i d  With meals and SSI  Plan:  · Continue Lantus 14 units qhs  · Lispro 4 units t i d  With meals  · SSI  · Hypoglycemia protocol  · POCT glucose checks with meals and before bedtime  · Diabetic diet      VTE Prophylaxis: Heparin  / sequential compression device   Code Status: Level One : Full Code  POLST: POLST form is not discussed and not completed at this time  Anticipated Length of Stay:  Patient will be admitted on an Emergency basis with an anticipated length of stay of  > 2 midnights  Justification for Hospital Stay: Chest Pain    Chief Complaint:   Sternal Chest Pain    History of Present Illness:    Godwin Raymond is a 58 y o  male with PMH of HTN, HLD, ESRD on dialysis MWF, IDDM and CVArecently discharged on 4/30 from [facility] for dizziness and CP who presents with constant, non-radiating sternal CP that was 8/10 pain that started at midnight and woke him up from his sleep    During admission, the pain had subsided to 4/10 after receiving ASA and nitro  He admits to associated dizziness and nausea  He denies fever, chills, diaphoresis, palpitations, left arm numbness or tingling sensation, vomiting, abdominal pain, diarrhea, lower extremity edema  Patient was recently admitted for CP and dizziness on 4/29 and full workup was negative  He was to f/u with cardiology outpatient after d/c  At d/d the CP and dizziness had resolved  In the ED patient was afebrile and hemodynamically stable with elevated BP of 183/81  Labs were significant for elevated Creatinine at 8 66  Troponin level was 78 in the setting of ESRD and worsening Creatinine  ECG was not significantly changed from previous ECG  CXR showed no acute pathology   He received ASA 325mg po x 1 dose and Nitroglycerin 0 4 mg SL x 1 dose and s/s improved  Patient was readmitted for CP  Review of Systems:    Review of Systems   Constitutional: Negative for activity change, appetite change, chills, fatigue, fever and unexpected weight change  HENT: Negative for congestion  Eyes: Negative for visual disturbance  Respiratory: Negative for shortness of breath  Cardiovascular: Positive for chest pain (4/10 sternal without radiation)  Negative for palpitations  Gastrointestinal: Negative for abdominal pain, constipation, diarrhea, nausea and vomiting  Genitourinary: Negative for difficulty urinating  Musculoskeletal: Negative for arthralgias, joint swelling, myalgias and neck pain  Skin: Negative for rash  Neurological: Negative for dizziness, weakness, numbness and headaches  Hematological: Does not bruise/bleed easily  Psychiatric/Behavioral: Negative for agitation, behavioral problems and confusion         Past Medical and Surgical History:     Past Medical History:   Diagnosis Date    Cerebrovascular accident (CVA) due to thrombosis of left middle cerebral artery ([facility] ) 7/29/2018    Chronic kidney disease     Diabetes mellitus ([facility] )     GERD (gastroesophageal reflux disease)     Hypercholesteremia     Hyperlipidemia     Hypertension     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    Neuropathy     Obesity     [facility]     last assessed 11/4/16    PVC's (premature ventricular contractions)     sees cardiology Dr Shayna camargo    Stroke [facility])     last weeof July 2018 [facility]    TIA (transient ischemic attack) 10/28/2018       Past Surgical History:   Procedure Laterality Date    ABDOMINAL SURGERY      CHOLECYSTECTOMY      Percutaneous    COLONOSCOPY      CYSTOSCOPY      OTHER SURGICAL HISTORY      "stimulator to control bowel movements"    CT ESOPHAGOGASTRODUODENOSCOPY TRANSORAL DIAGNOSTIC N/A 9/27/2016    Procedure: ESOPHAGOGASTRODUODENOSCOPY (EGD); Surgeon: Nicole Landaverde MD;  Location: [facility]; Service: Gastroenterology    AK LAP,CHOLECYSTECTOMY N/A 2/29/2016    Procedure: LAPAROSCOPIC CHOLECYSTECTOMY ;  Surgeon: Patrice Simons DO;  Location: [facility];  Service: General    ROTATOR CUFF REPAIR Right     TOE AMPUTATION Right 10/28/2016    Procedure: 3RD TOE AMPUTATION ;  Surgeon: Maria Isabel Miller DPM;  Location: [facility];  Service:        Meds/Allergies:    Prior to Admission medications    Medication Sig Start Date End Date Taking? Authorizing Provider   aluminum-magnesium hydroxide-simethicone (MYLANTA) 200-200-20 mg/5 mL suspension Take 30 mL by mouth every 4 (four) hours as needed for indigestion or heartburn 4/21/22   Jaymie Garcia MD   aspirin (ECOTRIN LOW STRENGTH) 81 mg EC tablet Take 81 mg by mouth daily Resume on 8/14      Historical Provider, MD   atorvastatin (LIPITOR) 40 mg tablet Take 1 tablet (40 mg total) by mouth daily with dinner 12/13/21   Raj Torres DO   BD Pen Needle Adina U/F 32G X 4 MM MISC USE TO INJECT INSULIN 4 TIMES DAILY 5/19/21   Lissette Brennan PA-C   Blood Glucose Monitoring Suppl (True Metrix Meter) w/Device KIT Use to test blood sugars 3 times daily 3/28/22   Lissette Brennan PA-C   Blood Pressure Monitoring (BLOOD PRESSURE CUFF) MISC Use to check blood pressure before taking blood pressure medication and 1 hour after and follow instructions provided in discharge instructions based on the readings   8/13/18   Earle England MD   calcium acetate (CALPHRON) 667 mg TAKE 2 TABLETS BY MOUTH THREE TIMES DAILY WITH MEALS 10/17/21   Historical Provider, MD   calcium carbonate (TUMS) 500 mg chewable tablet Chew 1 tablet (500 mg total) daily as needed for indigestion or heartburn 4/21/22   Jaymie Garcia MD   carvedilol (COREG) 12 5 mg tablet Take 1 tablet (12 5 mg total) by mouth 2 (two) times a day with meals 4/30/22 5/30/22  Archana Lambert MD   Cholecalciferol (Vitamin D3) 1 25 MG (72221 UT) CAPS TAKE 1 CAPSULE BY MOUTH ONE TIME PER WEEK 10/6/21   Miguel Ramachandran MD   Continuous Blood Gluc  (DEXCOM G6 ) LANCE Use as directed for continuous glucose monitoring 10/28/19   Lissette Brennan PA-C   Continuous Blood Gluc Sensor (DEXCOM G6 SENSOR) MISC Use as directed for continuous glucose monitoring  Change every 10 days 10/28/19   Lissette Brennan PA-C   Continuous Blood Gluc Transmit (DEXCOM G6 TRANSMITTER) MISC Use as directed for continuous glucose monitoring-Change every 3 months 10/28/19   Lissette Brennan PA-C   doxercalciferol (HECTOROL) 0 5 mcg capsule 4 mcg 11/10/21 11/9/22  Historical Provider, MD   glucose blood (True Metrix Blood Glucose Test) test strip Use 1 each 3 (three) times a day Use as instructed 4/12/22   Erika Luevano MD   Incontinence Supplies (MALE URINAL) MISC by Does not apply route daily 9/24/18   Raj De La Cruz DO   insulin lispro (HumaLOG KwikPen) 100 units/mL injection pen INJECT 4 UNITS 3 TIMES A DAY WITH MEALS PLUS SCALE (max daily dose 42 units) 4/7/22   Erika Luevano MD   Insulin Syringe-Needle U-100 (B-D INS SYRINGE 0 5CC/30GX1/2") 30G X 1/2" 0 5 ML MISC Inject under the skin 4 (four) times a day 6/5/20   Erika Luevano MD   Lancets Ultra Thin 30G MISC Use 3 times a day 3/28/22   Lissette Brennan PA-C   Lantus 100 UNIT/ML subcutaneous injection INJECT 14 UNITS UNDER THE SKIN DAILY 4/13/22   Lissette Brennan PA-C   meclizine (ANTIVERT) 25 mg tablet Take 1 tablet (25 mg total) by mouth every 8 (eight) hours as needed for dizziness or nausea 4/7/22   Chery Conklin MD   Nutritional Supplements (VITAMIN D BOOSTER PO) Take 0 5 mcg by mouth   6/21/21 6/20/22  Historical Provider, MD Perez Spindle LANCETS [id number] [facility] by Does not apply route 3 (three) times a day 11/27/18   Raj Auguste DO   TORSEMIDE PO Take 50 mg by mouth Pt takes 50 mg daily on non- dialysis days: sat, sun, Tues, and thrusday   Pt takes 10 mg of torsemide daily on dialysis days m- w- f   10/25/21 Historical Provider, MD     I have reviewed home medications with patient personally  Allergies: No Known Allergies    Social History:     Marital Status: /Civil Union   Occupation: None  Patient Pre-hospital Living Situation: Home with wife  Patient Pre-hospital Level of Mobility: Walks independently  Patient Pre-hospital Diet Restrictions: Diabetic diet  Substance Use History:   Social History     Substance and Sexual Activity   Alcohol Use Not Currently     Social History     Tobacco Use   Smoking Status Never Smoker   Smokeless Tobacco Never Used     Social History     Substance and Sexual Activity   Drug Use No       Family History:    Family History   Problem Relation Age of Onset    Leukemia Mother     Liver disease Mother     Lung cancer Mother         heavy smoker - 3 ppd    Heart disease Father     Liver disease Father     Multiple myeloma Sister     Breast cancer Sister     Urolithiasis Family     Alcohol abuse Neg Hx     Depression Neg Hx     Drug abuse Neg Hx     Substance Abuse Neg Hx     Mental illness Neg Hx        Physical Exam:     Vitals:   Blood Pressure: 157/74 (05/01/22 0445)  Pulse: 76 (05/01/22 0445)  Temperature: 98 2 °F (36 8 °C) (05/01/22 0308)  Temp Source: Oral (05/01/22 0308)  Respirations: 18 (05/01/22 0445)  SpO2: 99 % (05/01/22 0445)    Physical Exam  Vitals and nursing note reviewed  Constitutional:       General: He is not in acute distress  Appearance: Normal appearance  He is not ill-appearing, toxic-appearing or diaphoretic  HENT:      Head: Normocephalic and atraumatic  Nose: Nose normal       Mouth/Throat:      Mouth: Mucous membranes are moist       Pharynx: No oropharyngeal exudate or posterior oropharyngeal erythema  Eyes:      General: No scleral icterus  Right eye: No discharge  Left eye: No discharge  Pupils: Pupils are equal, round, and reactive to light  Neck:      Vascular: No carotid bruit     Cardiovascular: Rate and Rhythm: Normal rate and regular rhythm  Pulses: Normal pulses  Heart sounds: Normal heart sounds  No murmur heard  No friction rub  No gallop  Pulmonary:      Effort: Pulmonary effort is normal  No respiratory distress  Breath sounds: Normal breath sounds  No stridor  No wheezing, rhonchi or rales  Abdominal:      General: Bowel sounds are normal       Palpations: Abdomen is soft  Tenderness: There is no abdominal tenderness  There is no right CVA tenderness, left CVA tenderness, guarding or rebound  Musculoskeletal:         General: No swelling, tenderness or deformity  Cervical back: Normal range of motion and neck supple  No rigidity  No muscular tenderness  Right lower leg: No edema  Left lower leg: No edema  Lymphadenopathy:      Cervical: No cervical adenopathy  Skin:     General: Skin is warm and dry  Capillary Refill: Capillary refill takes less than 2 seconds  Coloration: Skin is not jaundiced  Findings: No lesion or rash  Neurological:      General: No focal deficit present  Mental Status: He is alert and oriented to person, place, and time  Mental status is at baseline  Motor: No weakness  Psychiatric:         Mood and Affect: Mood normal          Behavior: Behavior normal          Thought Content: Thought content normal          Judgment: Judgment normal            Additional Data:     Lab Results: I have personally reviewed pertinent reports        Results from last 7 days   Lab Units 05/01/22  0334   WBC Thousand/uL 6 10   HEMOGLOBIN g/dL 10 5*   HEMATOCRIT % 32 8*   PLATELETS Thousands/uL 140*   NEUTROS PCT % 59   LYMPHS PCT % 27   MONOS PCT % 11   EOS PCT % 2     Results from last 7 days   Lab Units 05/01/22  0334   POTASSIUM mmol/L 4 5   CHLORIDE mmol/L 102   CO2 mmol/L 26   BUN mg/dL 69*   CREATININE mg/dL 8 66*   CALCIUM mg/dL 8 7   ALK PHOS U/L 126*   ALT U/L 20   AST U/L 8     Results from last 7 days   Lab Units 04/29/22  1015   INR  1 01       Imaging: I have personally reviewed pertinent films in PACS    CT head wo contrast    Result Date: 4/30/2022  Narrative: CT BRAIN - WITHOUT CONTRAST INDICATION:   Dizziness, non-specific dizziness  unable to get MRI  COMPARISON:  April 29, 2022 TECHNIQUE:  CT examination of the brain was performed  In addition to axial images, sagittal and coronal 2D reformatted images were created and submitted for interpretation  Radiation dose length product (DLP) for this visit:  [phone] mGy-cm   This examination, like all CT scans performed in the [facility], was performed utilizing techniques to minimize radiation dose exposure, including the use of iterative reconstruction and automated exposure control  IMAGE QUALITY:  Diagnostic  FINDINGS: PARENCHYMA: Decreased attenuation is noted in periventricular and subcortical white matter demonstrating an appearance that is statistically most likely to represent mild microangiopathic change; this appearance is similar when compared to most recent prior examination  Unchanged subcortical infarction in the right precentral gyrus  Unchanged chronic lacunar infarction involving left ventral medial thalamus and genu of the left internal capsule  Heavy atherosclerotic carotid and vertebral artery calcification again noted  No CT signs of acute infarction  No intracranial mass, mass effect or midline shift  No acute parenchymal hemorrhage  VENTRICLES AND EXTRA-AXIAL SPACES:  Normal for the patient's age  VISUALIZED ORBITS AND PARANASAL SINUSES:  Left maxillary sinus mucosal thickening  No sinus fluid levels  Normal aeration of mastoid air cells  Normal orbits  CALVARIUM AND EXTRACRANIAL SOFT TISSUES:  Normal      Impression: No acute intracranial abnormality  Chronic ischemic changes similar from previous examination   Workstation performed: [id number]     CT head without contrast    Result Date: 4/6/2022  Narrative: CT BRAIN - WITHOUT CONTRAST INDICATION:   dizziness  COMPARISON: CT dated 2/19/2020 and MRI dated 12/10/2019  TECHNIQUE:  CT examination of the brain was performed  In addition to axial images, sagittal and coronal 2D reformatted images were created and submitted for interpretation  Radiation dose length product (DLP) for this visit:  915 mGy-cm   This examination, like all CT scans performed in the [facility], was performed utilizing techniques to minimize radiation dose exposure, including the use of iterative reconstruction and automated exposure control  IMAGE QUALITY:  Diagnostic  FINDINGS: PARENCHYMA:  No intracranial mass, mass effect or midline shift  No CT signs of acute infarction  No acute parenchymal hemorrhage  Mild chronic microangiopathy  Stable small chronic cortical infarct in the posterior right frontal lobe  Stable focal encephalomalacia in the anterior left thalamus and genu of internal capsule  Extensive intracranial atherosclerotic calcifications  Ophthalmic arteries are also calcified  VENTRICLES AND EXTRA-AXIAL SPACES: Age appropriate volume loss  No hydrocephalus  VISUALIZED ORBITS AND PARANASAL SINUSES:  Orbits are unremarkable  Left maxillary sinus mucosal thickening and retention cysts  Sharlee Freud CALVARIUM AND EXTRACRANIAL SOFT TISSUES:  Normal      Impression: No acute intracranial pathology  Stable chronic ischemic changes  Workstation performed: [id number]     MRI brain wo contrast    Result Date: 4/20/2022  Narrative: MRI BRAIN WITHOUT CONTRAST INDICATION: Nausea vomiting and right sided paraesthesias  COMPARISON:   4/19/2022  TECHNIQUE:  Sagittal T1, axial T2, axial T1, axial Stanton and axial diffusion imaging  IMAGE QUALITY:  Suboptimal exam due to significant patient motion artifact  FINDINGS: BRAIN PARENCHYMA: Chronic lacunar infarct in the left ventral medial thalamus  Small, chronic infarct in the right parietal lobe  No restricted diffusion to indicate an acute infarct  Susceptibility artifact throughout the basal ganglia consistent with metabolic or physiologic mineralization  No evidence of intracranial hemorrhage VENTRICLES:  No hydrocephalus  SELLA AND PITUITARY GLAND:  No sellar enlargement  ORBITS:  Limited evaluation  PARANASAL SINUSES:  Limited evaluation  VASCULATURE:  Not well evaluated  CALVARIUM AND SKULL BASE:  Limited evaluation  EXTRACRANIAL SOFT TISSUES:  Limited evaluation  Impression: Limited exam due to significant patient motion artifact  No evidence of acute infarct  Workstation performed: [id number]     XR chest 1 view    Result Date: 4/29/2022  Narrative: CHEST INDICATION:   chest pain  COMPARISON:  9/8/2021 EXAM PERFORMED/VIEWS:  XR CHEST 1 VIEW Single view FINDINGS: Development of mild vascular congestion Cardiomediastinal silhouette has become  enlarged No pneumothorax or pleural effusion  Osseous structures appear within normal limits for patient age  Impression: Development of cardiomegaly and mild vascular congestion Workstation performed: [id number]     Stress strip    Result Date: 4/14/2022  Narrative: Confirmed by Megan Key ([id number]),  Cyndi Lucio ([id number]) on 4/14/2022 1:04:48 PM    XR toe left great min 2 views    Result Date: 4/19/2022  Narrative: LEFT TOES INDICATION:   K16 [id number]: Unspecified injury of left foot, initial encounter  COMPARISON:  2/16/2021  VIEWS:  XR TOE LEFT GREAT MIN 2 VIEWS FINDINGS: New longitudinally oriented lucency in the first distal phalanx that may extend through the distal articular surface medially that is suspicious for nondisplaced fracture  Old healed fifth proximal phalanx fracture  No lytic or blastic osseous lesion  Diffuse vascular calcifications  Soft tissues are otherwise unremarkable  Impression: Nondisplaced fracture first proximal phalanx  Findings concur with the preliminary report by the referring clinician already in PACS and/or our electronic record EPIC   Workstation performed: [id number]     XR follow up    Result Date: 4/20/2022  Narrative: ABDOMEN - NO CHARGE INDICATION:   BLADDER STIM UNIT Nausea vomiting and right sided paraesthesias  COMPARISON:  CTA chest CT abdomen pelvis with contrast January 2, 2022  VIEWS:  AP supine Images: 2 FINDINGS: Sacral stimulator lead is intact  Partially imaged nonobstructive bowel gas pattern  Mild colonic stool burden  Vascular calcifications  Degenerative changes of lumbar spine  Impression: Sacral stimulator lead is intact  Workstation performed: [id number]     CT stroke alert brain    Result Date: 4/29/2022  Narrative: CT BRAIN - STROKE ALERT PROTOCOL INDICATION:   headache, persistent dizziness, history of prior stroke  COMPARISON:  CTA head and neck 4/19/2022 TECHNIQUE:  CT examination of the brain was performed  In addition to axial images, coronal reformatted images were created and submitted for interpretation  Radiation dose length product (DLP) for this visit:  956 mGy-cm   This examination, like all CT scans performed in the [facility], was performed utilizing techniques to minimize radiation dose exposure, including the use of iterative reconstruction and automated exposure control  IMAGE QUALITY:  Diagnostic  FINDINGS:  PARENCHYMA: Stable cortical/subcortical infarct in the right precentral gyrus  Stable encephalomalacia involving left ventromedial left thalamus and genu of left internal capsule  No intracranial mass, mass effect or midline shift  No CT signs of acute infarction  No acute parenchymal hemorrhage  Severe valcular calcifications noted  VENTRICLES AND EXTRA-AXIAL SPACES:  Normal for patient's age  VISUALIZED ORBITS AND PARANASAL SINUSES:  Orbits are unremarkable  Left maxillary sinus mucosal thickening/retention cyst CALVARIUM AND EXTRACRANIAL SOFT TISSUES:   Normal      Impression: No acute intracranial CT abnormality  Stable sequela of old infarcts as described   I personally discussed this study with Dr Francisca Mabry on 4/29/2022 at 10:59 AM  Workstation performed: [id number]     CT stroke alert brain    Result Date: 4/19/2022  Narrative: CT BRAIN - STROKE ALERT PROTOCOL INDICATION:   Neuro deficit, acute, stroke suspected R sided numbness  COMPARISON:  CT brain dated April 6, 2022  TECHNIQUE:  CT examination of the brain was performed  In addition to axial images, coronal reformatted images were created and submitted for interpretation  Radiation dose length product (DLP) for this visit:  958 mGy-cm   This examination, like all CT scans performed in the [facility], was performed utilizing techniques to minimize radiation dose exposure, including the use of iterative reconstruction and automated exposure control  IMAGE QUALITY:  Diagnostic  FINDINGS:  PARENCHYMA:  No intracranial mass, mass effect or midline shift  No CT signs of acute infarction  No acute parenchymal hemorrhage  Mild chronic microangiopathy  Stable small chronic cortical infarct in the posterior right frontal lobe  Stable focal encephalomalacia in the anterior left thalamus and genu of internal capsule  Extensive intracranial atherosclerotic calcifications  There is a stable small focus of encephalomalacia at the posterior left occipital lobe likely the sequela of prior ischemia  VENTRICLES AND EXTRA-AXIAL SPACES:  Normal for patient's age  VISUALIZED ORBITS AND PARANASAL SINUSES:  Orbits appear normal   Mild scattered sinus mucosal thickening is noted  No fluid levels are seen  CALVARIUM AND EXTRACRANIAL SOFT TISSUES:   Normal      Impression: No acute intracranial abnormality  Stable chronic small vessel ischemic changes     I personally discussed this study with Sarah Sanon on 4/19/2022 at 6:44 AM   Workstation performed: [id number]     CTA stroke alert (head/neck)    Result Date: 4/29/2022  Narrative: CTA NECK AND BRAIN WITH CONTRAST INDICATION: headache, persistent dizziness, prior stroke COMPARISON:   CTA head and neck 4/19/2022 TECHNIQUE:   Post contrast imaging was performed after administration of iodinated contrast through the neck and brain  Post contrast axial 0 625 mm images timed to opacify the arterial system  3D rendering was performed on an independent workstation  MIP reconstructions performed  Coronal reconstructions were performed of the noncontrast portion of the brain  Radiation dose length product (DLP) for this visit:  710 mGy-cm   This examination, like all CT scans performed in the [facility], was performed utilizing techniques to minimize radiation dose exposure, including the use of iterative reconstruction and automated exposure control  IV Contrast:  85 mL of iohexol (OMNIPAQUE)  IMAGE QUALITY:   Diagnostic FINDINGS: CERVICAL VASCULATURE AORTIC ARCH AND GREAT VESSELS: Mild atherosclerotic calcification of aortic arch  Great vessel origins are patent without significant stenosis  Coronary artery atherosclerotic disease  RIGHT VERTEBRAL ARTERY CERVICAL SEGMENT: Limited assessment of the origin due to motion distorted image quality  Severe atherosclerotic narrowing at the origin  The vessel is patent throughout the neck without high-grade stenosis  LEFT VERTEBRAL ARTERY CERVICAL SEGMENT: Limited assessment of the origin due to motion distorted image quality in this region  Severe atherosclerotic narrowing at the origin  The vessel is patent throughout the neck without high-grade stenosis  RIGHT EXTRACRANIAL CAROTID SEGMENT:Partially calcified atherosclerotic plaque at the bifurcation and proximal internal carotid artery  No hemodynamically significant stenosis of cervical ICA by NASCET criteria ( less than 50%) Extensive atherosclerotic disease of external carotid artery branches  LEFT EXTRACRANIAL CAROTID SEGMENT: Partially calcified atherosclerotic plaque at the bifurcation and proximal internal carotid artery   No hemodynamically significant stenosis of cervical ICA by NASCET criteria ( less than 50%) Extensive atherosclerotic disease of external carotid artery branches  INTRACRANIAL VASCULATURE INTERNAL CAROTID ARTERIES: Atherosclerotic disease of intracranial internal carotid artery with stable severe stenosis at distal petrous and proximal cavernous segments of right ICA  Normal ophthalmic artery origins  ANTERIOR CIRCULATION:  Normal A1 segments  Bilateral anterior cerebral arteries are unremarkable  Normal anterior comminuting artery  MIDDLE CEREBRAL ARTERY CIRCULATION:  Bilateral M1 segments and major M2 branches are patent without critical stenosis  DISTAL VERTEBRAL ARTERIES: Stable high-grade atherosclerotic stenosis in the mid intradural left vertebral artery  Moderate atherosclerotic disease of right intradural vertebral artery  Bilateral PICA origins are patent  BASILAR ARTERY:  Basilar artery is unremarkable  Normal superior cerebellar arteries  POSTERIOR CEREBRAL ARTERIES:    Bilateral posterior cerebral arteries are patent without high-grade stenosis  Absent/hypoplastic posterior communicating arteries  DURAL VENOUS SINUSES:  Unremarkable  NON VASCULAR ANATOMY BONY STRUCTURES: No acute or aggressive appearing osseous abnormality  SOFT TISSUES OF THE NECK:  Unremarkable  THORACIC INLET:  Unremarkable  Impression: 1  No significant interval change compared to CTA 4/19/2022  2   Stable severe stenosis at distal petrous and proximal cavernous segments of right ICA  3   Stable high-grade stenosis of bilateral vertebral artery origins  High-grade atherosclerotic stenosis in the mid intradural left vertebral artery  Moderate atherosclerotic disease of right intradural vertebral artery  4   Atherosclerotic change of cervical carotid arteries (right greater than left) without hemodynamically significant stenosis (less than 50%)  5   Extensive atherosclerotic disease of bilateral external carotid arteries and branches    I personally discussed this study with Dr Franca Loo on 4/29/2022 at 10:59 AM   Workstation performed: [id number]     CTA stroke alert (head/neck)    Result Date: 4/19/2022  Narrative: CTA NECK AND BRAIN WITH CONTRAST INDICATION: Neuro deficit, acute, stroke suspected R sided numbness COMPARISON:   CTA had and neck dated October 28, 2018  TECHNIQUE:   Post contrast imaging was performed after administration of iodinated contrast through the neck and brain  Post contrast axial 0 625 mm images timed to opacify the arterial system  3D rendering was performed on an independent workstation  MIP reconstructions performed  Coronal reconstructions were performed of the noncontrast portion of the brain  Radiation dose length product (DLP) for this visit:  679 mGy-cm   This examination, like all CT scans performed in the [facility], was performed utilizing techniques to minimize radiation dose exposure, including the use of iterative reconstruction and automated exposure control  IV Contrast:  85 mL of iohexol (OMNIPAQUE)  IMAGE QUALITY:   Diagnostic FINDINGS: CERVICAL VASCULATURE AORTIC ARCH AND GREAT VESSELS:  Mild athersclerotic disease of the arch, proximal great vessels and visualized subclavian vessels  No significant stenosis  RIGHT VERTEBRAL ARTERY CERVICAL SEGMENT:  Mild calcified atherosclerotic plaque formation at the origin  Mild stenosis of the right vertebral artery origin  The vessel is normal in caliber throughout the neck  LEFT VERTEBRAL ARTERY CERVICAL SEGMENT:  Normal origin  The vessel is normal in caliber throughout the neck  RIGHT EXTRACRANIAL CAROTID SEGMENT:  Mild to moderate atherosclerotic disease of the distal common carotid artery and proximal cervical internal carotid artery without significant stenosis compared to the more distal ICA   LEFT EXTRACRANIAL CAROTID SEGMENT:  Moderate to moderate atherosclerotic disease of the distal common carotid artery and proximal cervical internal carotid artery without significant stenosis compared to the more distal ICA  NASCET criteria was used to determine the degree of internal carotid artery diameter stenosis  INTRACRANIAL VASCULATURE INTERNAL CAROTID ARTERIES:  There is moderate to severe atherosclerotic plaque formation within the cavernous and supraclinoid segments bilaterally, right side greater than left  This results in moderate stenosis of the right cavernous internal carotid artery  Normal ophthalmic artery origins  Normal ICA terminus  ANTERIOR CIRCULATION:  The right A1 segment is mildly hypoplastic when compared to the left     Normal anterior communicating artery  MIDDLE CEREBRAL ARTERY CIRCULATION:  The right middle cerebral artery is unremarkable    There is a short focus of moderate stenosis involving the proximal M1 segment of mild vessel irregularity  Again noted is mildly diminished flow involving the distal left MCA branches when compared to the right  DISTAL VERTEBRAL ARTERIES:  There is moderate calcified and noncalcified atherosclerotic plaque formation in the intracranial segments of the vertebral arteries bilaterally  There is segmental visualization of the intracranial left vertebral artery  There is a short segment severe stenosis of the proximal intracranial segment of the left vertebral artery  Vertebrobasilar junction is intact  BASILAR ARTERY:  Basilar artery is normal in caliber  Normal superior cerebellar arteries  POSTERIOR CEREBRAL ARTERIES: Both posterior cerebral arteries arises from the basilar tip  Moderate stenosis of the proximal P1 segment of the left posterior cerebral artery  The left posterior cerebral arteries otherwise normal   The right posterior cerebral artery is normal   The posterior communicating arteries are normal bilaterally  DURAL VENOUS SINUSES:  Normal  NON VASCULAR ANATOMY BONY STRUCTURES: Degenerative changes cervical spine  No acute osseous abnormality   SOFT TISSUES OF THE NECK:  Unremarkable  THORACIC INLET:  Unremarkable  Impression: No acute intracranial abnormality  No large vessel occlusion  Moderate atherosclerotic disease with moderate stenosis of the cavernous right internal carotid artery and severe stenosis of the intracranial left vertebral artery  Focus of moderate stenosis at the proximal left M1 segment  Chronic truncation of the terminal left middle cerebral artery territory branches  Moderate stenosis of the proximal P1 segment of the left posterior cerebral artery  Mild to moderate atherosclerotic plaquing of the bilateral carotid bulbs causing no hemodynamically significant stenosis  I personally reported the findings to Sarah Sanon on 4/19/2022 at 6:44 AM  Workstation performed: [id number]     Echo complete w/ contrast if indicated    Result Date: 4/20/2022  Narrative: Letty Seller  Left Ventricle: Left ventricular cavity size is normal  Wall thickness is moderately increased  The left ventricular ejection fraction is 65%  Systolic function is normal  Diastolic function is mildly abnormal, consistent with grade I (abnormal) relaxation  There is moderate to severe concentric hypertrophy    The following segments are hypokinetic: basal inferior and basal inferolateral    All other segments are normal    Right Ventricle: Right ventricular cavity size is mildly dilated  Systolic function is normal    Left Atrium: The atrium is mildly dilated    Right Atrium: The atrium is mildly dilated    Aortic Valve: The aortic valve is trileaflet  The leaflets are severely thickened  The leaflets are moderately calcified  There is moderately reduced mobility  There is mild regurgitation  There is moderate stenosis (FATUMA 1 2 cm2/MG 12 mmHg/DI 0 34)  The aortic valve velocity is increased due to stenosis    Mitral Valve: There is severe annular calcification  There is mild regurgitation    Aorta: The aortic root is mildly dilated at 4 1 cm   The ascending aorta is mildly dilated at 4 0 cm    Pericardium: There is a small pericardial effusion posterior to the heart  The fluid exhibits no internal echoes  NM myocardial perfusion spect (rx stress and/or rest)    Result Date: 4/14/2022  Narrative: Letty Martinez  Stress ECG: A pharmacological stress test was performed using regadenoson  The patient experienced no angina during the test    Stress ECG: The stress ECG is negative for ischemia after pharmacologic stress    Perfusion: There is a left ventricular perfusion defect that is medium in size with severe reduction in uptake present in the basal to mid inferior and inferolateral location(s) consistent with prior scar    Stress Function: Left ventricular function post-stress is normal  Post-stress ejection fraction is 46 %  There is a defect in the basal to mid inferior and inferolateral location(s)    Stress Combined Conclusion: Findings are consistent with inferolateral infarction  EKG, Pathology, and Other Studies Reviewed on Admission:   · EKG: NSR HR 82 RBBB Qtc 486  Epic / Care Everywhere Records Reviewed: Yes     ** Please Note: This note has been constructed using a voice recognition system   **

## 2022-05-02 ENCOUNTER — TELEPHONE (OUTPATIENT)
Dept: CARDIOLOGY CLINIC | Facility: CLINIC | Age: 62
End: 2022-05-02

## 2022-05-02 ENCOUNTER — TRANSITIONAL CARE MANAGEMENT (OUTPATIENT)
Dept: INTERNAL MEDICINE CLINIC | Facility: CLINIC | Age: 62
End: 2022-05-02

## 2022-05-02 ENCOUNTER — APPOINTMENT (INPATIENT)
Dept: DIALYSIS | Facility: HOSPITAL | Age: 62
DRG: 246 | End: 2022-05-02
Payer: MEDICARE

## 2022-05-02 PROBLEM — E87.8 ELECTROLYTE ABNORMALITY: Status: ACTIVE | Noted: 2022-05-02

## 2022-05-02 LAB
ANION GAP SERPL CALCULATED.3IONS-SCNC: 14 MMOL/L (ref 4–13)
ATRIAL RATE: 79 BPM
BUN SERPL-MCNC: 79 MG/DL (ref 5–25)
CALCIUM SERPL-MCNC: 8.7 MG/DL (ref 8.3–10.1)
CHLORIDE SERPL-SCNC: 101 MMOL/L (ref 100–108)
CO2 SERPL-SCNC: 20 MMOL/L (ref 21–32)
CREAT SERPL-MCNC: 9.19 MG/DL (ref 0.6–1.3)
GFR SERPL CREATININE-BSD FRML MDRD: 5 ML/MIN/1.73SQ M
GLUCOSE SERPL-MCNC: 126 MG/DL (ref 65–140)
GLUCOSE SERPL-MCNC: 130 MG/DL (ref 65–140)
GLUCOSE SERPL-MCNC: 132 MG/DL (ref 65–140)
GLUCOSE SERPL-MCNC: 140 MG/DL (ref 65–140)
KCT BLD-ACNC: 348 SEC (ref 89–137)
P AXIS: 30 DEGREES
POTASSIUM SERPL-SCNC: 5.1 MMOL/L (ref 3.5–5.3)
PR INTERVAL: 178 MS
QRS AXIS: -26 DEGREES
QRSD INTERVAL: 166 MS
QT INTERVAL: 454 MS
QTC INTERVAL: 520 MS
SODIUM SERPL-SCNC: 135 MMOL/L (ref 136–145)
SPECIMEN SOURCE: ABNORMAL
T WAVE AXIS: -20 DEGREES
VENTRICULAR RATE: 79 BPM

## 2022-05-02 PROCEDURE — 82948 REAGENT STRIP/BLOOD GLUCOSE: CPT

## 2022-05-02 PROCEDURE — 93454 CORONARY ARTERY ANGIO S&I: CPT | Performed by: INTERNAL MEDICINE

## 2022-05-02 PROCEDURE — C1725 CATH, TRANSLUMIN NON-LASER: HCPCS | Performed by: INTERNAL MEDICINE

## 2022-05-02 PROCEDURE — C1769 GUIDE WIRE: HCPCS | Performed by: INTERNAL MEDICINE

## 2022-05-02 PROCEDURE — 99153 MOD SED SAME PHYS/QHP EA: CPT | Performed by: INTERNAL MEDICINE

## 2022-05-02 PROCEDURE — NC001 PR NO CHARGE: Performed by: INTERNAL MEDICINE

## 2022-05-02 PROCEDURE — 99223 1ST HOSP IP/OBS HIGH 75: CPT | Performed by: INTERNAL MEDICINE

## 2022-05-02 PROCEDURE — C1887 CATHETER, GUIDING: HCPCS | Performed by: INTERNAL MEDICINE

## 2022-05-02 PROCEDURE — 93010 ELECTROCARDIOGRAM REPORT: CPT | Performed by: INTERNAL MEDICINE

## 2022-05-02 PROCEDURE — 92928 PRQ TCAT PLMT NTRAC ST 1 LES: CPT | Performed by: INTERNAL MEDICINE

## 2022-05-02 PROCEDURE — 5A1D70Z PERFORMANCE OF URINARY FILTRATION, INTERMITTENT, LESS THAN 6 HOURS PER DAY: ICD-10-PCS | Performed by: STUDENT IN AN ORGANIZED HEALTH CARE EDUCATION/TRAINING PROGRAM

## 2022-05-02 PROCEDURE — C1894 INTRO/SHEATH, NON-LASER: HCPCS | Performed by: INTERNAL MEDICINE

## 2022-05-02 PROCEDURE — B2111ZZ FLUOROSCOPY OF MULTIPLE CORONARY ARTERIES USING LOW OSMOLAR CONTRAST: ICD-10-PCS | Performed by: INTERNAL MEDICINE

## 2022-05-02 PROCEDURE — 99152 MOD SED SAME PHYS/QHP 5/>YRS: CPT | Performed by: INTERNAL MEDICINE

## 2022-05-02 PROCEDURE — 85347 COAGULATION TIME ACTIVATED: CPT

## 2022-05-02 PROCEDURE — 99232 SBSQ HOSP IP/OBS MODERATE 35: CPT | Performed by: STUDENT IN AN ORGANIZED HEALTH CARE EDUCATION/TRAINING PROGRAM

## 2022-05-02 PROCEDURE — C1874 STENT, COATED/COV W/DEL SYS: HCPCS | Performed by: INTERNAL MEDICINE

## 2022-05-02 PROCEDURE — 027034Z DILATION OF CORONARY ARTERY, ONE ARTERY WITH DRUG-ELUTING INTRALUMINAL DEVICE, PERCUTANEOUS APPROACH: ICD-10-PCS | Performed by: INTERNAL MEDICINE

## 2022-05-02 PROCEDURE — 99232 SBSQ HOSP IP/OBS MODERATE 35: CPT | Performed by: INTERNAL MEDICINE

## 2022-05-02 PROCEDURE — C9600 PERC DRUG-EL COR STENT SING: HCPCS | Performed by: INTERNAL MEDICINE

## 2022-05-02 PROCEDURE — 80048 BASIC METABOLIC PNL TOTAL CA: CPT

## 2022-05-02 DEVICE — XIENCE SKYPOINT™ EVEROLIMUS ELUTING CORONARY STENT SYSTEM 2.50 MM X 18 MM / RAPID-EXCHANGE
Type: IMPLANTABLE DEVICE | Site: CORONARY | Status: FUNCTIONAL
Brand: XIENCE SKYPOINT™

## 2022-05-02 RX ORDER — CLOPIDOGREL BISULFATE 75 MG/1
600 TABLET ORAL ONCE
Status: COMPLETED | OUTPATIENT
Start: 2022-05-02 | End: 2022-05-02

## 2022-05-02 RX ORDER — FENTANYL CITRATE 50 UG/ML
INJECTION, SOLUTION INTRAMUSCULAR; INTRAVENOUS AS NEEDED
Status: DISCONTINUED | OUTPATIENT
Start: 2022-05-02 | End: 2022-05-02 | Stop reason: HOSPADM

## 2022-05-02 RX ORDER — VERAPAMIL HCL 2.5 MG/ML
AMPUL (ML) INTRAVENOUS AS NEEDED
Status: DISCONTINUED | OUTPATIENT
Start: 2022-05-02 | End: 2022-05-02 | Stop reason: HOSPADM

## 2022-05-02 RX ORDER — CLOPIDOGREL BISULFATE 75 MG/1
75 TABLET ORAL DAILY
Status: DISCONTINUED | OUTPATIENT
Start: 2022-05-03 | End: 2022-05-03 | Stop reason: HOSPADM

## 2022-05-02 RX ORDER — SODIUM CHLORIDE 9 MG/ML
INJECTION, SOLUTION INTRAVENOUS
Status: COMPLETED | OUTPATIENT
Start: 2022-05-02 | End: 2022-05-02

## 2022-05-02 RX ORDER — ASPIRIN 81 MG/1
324 TABLET, CHEWABLE ORAL ONCE
Status: COMPLETED | OUTPATIENT
Start: 2022-05-02 | End: 2022-05-02

## 2022-05-02 RX ORDER — MIDAZOLAM HYDROCHLORIDE 2 MG/2ML
INJECTION, SOLUTION INTRAMUSCULAR; INTRAVENOUS AS NEEDED
Status: DISCONTINUED | OUTPATIENT
Start: 2022-05-02 | End: 2022-05-02 | Stop reason: HOSPADM

## 2022-05-02 RX ORDER — ASPIRIN 81 MG/1
243 TABLET, CHEWABLE ORAL ONCE
Status: DISCONTINUED | OUTPATIENT
Start: 2022-05-02 | End: 2022-05-02

## 2022-05-02 RX ORDER — NITROGLYCERIN 0.4 MG/1
0.4 TABLET SUBLINGUAL
Status: DISCONTINUED | OUTPATIENT
Start: 2022-05-02 | End: 2022-05-03 | Stop reason: HOSPADM

## 2022-05-02 RX ORDER — HEPARIN SODIUM 1000 [USP'U]/ML
INJECTION, SOLUTION INTRAVENOUS; SUBCUTANEOUS AS NEEDED
Status: DISCONTINUED | OUTPATIENT
Start: 2022-05-02 | End: 2022-05-02 | Stop reason: HOSPADM

## 2022-05-02 RX ORDER — LIDOCAINE HYDROCHLORIDE 10 MG/ML
INJECTION, SOLUTION EPIDURAL; INFILTRATION; INTRACAUDAL; PERINEURAL AS NEEDED
Status: DISCONTINUED | OUTPATIENT
Start: 2022-05-02 | End: 2022-05-02 | Stop reason: HOSPADM

## 2022-05-02 RX ORDER — NITROGLYCERIN 20 MG/100ML
INJECTION INTRAVENOUS AS NEEDED
Status: DISCONTINUED | OUTPATIENT
Start: 2022-05-02 | End: 2022-05-02 | Stop reason: HOSPADM

## 2022-05-02 RX ORDER — DEXTROSE MONOHYDRATE 25 G/50ML
25 INJECTION, SOLUTION INTRAVENOUS ONCE
Status: COMPLETED | OUTPATIENT
Start: 2022-05-02 | End: 2022-05-02

## 2022-05-02 RX ADMIN — HEPARIN SODIUM 5000 UNITS: 5000 INJECTION INTRAVENOUS; SUBCUTANEOUS at 22:32

## 2022-05-02 RX ADMIN — CARVEDILOL 12.5 MG: 12.5 TABLET, FILM COATED ORAL at 17:17

## 2022-05-02 RX ADMIN — CARVEDILOL 12.5 MG: 12.5 TABLET, FILM COATED ORAL at 09:25

## 2022-05-02 RX ADMIN — TORSEMIDE 10 MG: 10 TABLET ORAL at 09:25

## 2022-05-02 RX ADMIN — INSULIN LISPRO 4 UNITS: 100 INJECTION, SOLUTION INTRAVENOUS; SUBCUTANEOUS at 17:50

## 2022-05-02 RX ADMIN — DEXTROSE MONOHYDRATE 25 ML: 25 INJECTION, SOLUTION INTRAVENOUS at 11:54

## 2022-05-02 RX ADMIN — ATORVASTATIN CALCIUM 40 MG: 40 TABLET, FILM COATED ORAL at 17:17

## 2022-05-02 RX ADMIN — CLOPIDOGREL BISULFATE 600 MG: 75 TABLET ORAL at 11:59

## 2022-05-02 RX ADMIN — ASPIRIN 81 MG CHEWABLE TABLET 324 MG: 81 TABLET CHEWABLE at 11:54

## 2022-05-02 RX ADMIN — HEPARIN SODIUM 5000 UNITS: 5000 INJECTION INTRAVENOUS; SUBCUTANEOUS at 05:11

## 2022-05-02 RX ADMIN — CALCIUM ACETATE 667 MG: 667 CAPSULE ORAL at 17:17

## 2022-05-02 NOTE — ASSESSMENT & PLAN NOTE
Lab Results   Component Value Date    EGFR 5 05/02/2022    EGFR 6 05/01/2022    EGFR 5 05/01/2022    CREATININE 9 19 (H) 05/02/2022    CREATININE 8 37 (H) 05/01/2022    CREATININE 8 66 (H) 05/01/2022     HD on MWF      Plan:  · Continue HD while inpatient-->scheduled for 5/2 prior to cardiac catheterization  · Monitor electrolytes and kidney function  · Monitor volume status after hemodialysis  · Nephrology team actively following

## 2022-05-02 NOTE — ASSESSMENT & PLAN NOTE
Blood Pressure: 122/71    BP was initially elevated when presenting to the ED at 189/92  Home regimen includes Coreg 12 5 mg p o  B i d , torsemide 50 mg p o  Daily on non dialysis days, torsemide 10 mg p o  Daily on dialysis days MWF  Plan:  · Continue Coreg 12 mg p o  B i d   · Continue torsemide 50 mg p o  Daily on nondialysis days  · Continue torsemide 10 mg p o  Daily on dialysis days MWF  · Monitor BP qshift

## 2022-05-02 NOTE — PROGRESS NOTES
[facility]  Progress Note - [place] Nieves 1960, 58 y o  male MRN: [id number]  Unit/Bed#: S -01 Encounter: [id number]  Primary Care Provider: [facility] DO [place]   Date and time admitted to hospital: 5/1/2022  3:03 AM    * Chest pain  Assessment & Plan  Patient presented with chest pain that was 8/10, constant, nonradiating sternal dull pressure  S/S improved with ASA and nitroglycerin  ECG showed NSR with HR of 84 and   Troponin level 78    Plan:  · Continue ASA 81 mg p o  Daily  · Load with Plavix 600 mg prior to cardiac catheterization today (to be done after HD session)  · Nitroglycerin 0 4 mg SL p r n  · Telemetry on 5/2- unremarkable for acute ischemic changes, rate 70s  · Cardiac catheterization planned for 5/2/22 afternoon    Electrolyte abnormality  Assessment & Plan  Na 135, K 5 1 noted on 5/2 labwork    Plan:  · Dialysis scheduled for 5/2  · Telemetry unremarkable for hyperkalemic electrographic changes  · Monitor BMP        ESRD on dialysis [facility])  Assessment & Plan  Lab Results   Component Value Date    EGFR 5 05/02/2022    EGFR 6 05/01/2022    EGFR 5 05/01/2022    CREATININE 9 19 (H) 05/02/2022    CREATININE 8 37 (H) 05/01/2022    CREATININE 8 66 (H) 05/01/2022     HD on MWF  Plan:  · Continue HD while inpatient-->scheduled for 5/2 prior to cardiac catheterization  · Monitor electrolytes and kidney function  · Monitor volume status after hemodialysis  · Nephrology team actively following      Hypertension  Assessment & Plan  Blood Pressure: 122/71    BP was initially elevated when presenting to the ED at 189/92  Home regimen includes Coreg 12 5 mg p o  B i d , torsemide 50 mg p o  Daily on non dialysis days, torsemide 10 mg p o  Daily on dialysis days MWF  Plan:  · Continue Coreg 12 mg p o  B i d   · Continue torsemide 50 mg p o  Daily on nondialysis days  · Continue torsemide 10 mg p o  Daily on dialysis days MWF    · Monitor BP qshift  Anemia  Assessment & Plan  Recent Labs     04/30/22  0956 05/01/22  0334 05/01/22  0609   HGB 11 1* 10 5* 10 2*     Etiology:  CKD  Not on EPO at this time  At baseline  Plan:  · Continue to monitor CBC  · Transfuse for hemoglobin < 7    Type 2 diabetes mellitus with chronic kidney disease on chronic dialysis, with long-term current use of insulin [facility])  Assessment & Plan  Lab Results   Component Value Date    HGBA1C 6 0 (H) 04/30/2022       Recent Labs     05/01/22  1620 05/01/22  2134 05/02/22  0743 05/02/22  1100   POCGLU 145* 149* 130 132       Blood Sugar Average: Last 72 hrs:  (P) 140 [id number]     Home regimen includes Lantus 14 units SC q h s  lispro 4 units SC t i d  With meals and SSI  Plan:  · Lantus 14 units qhs  · Lispro 4 units t i d  With meals--> held on 05/02 a m  Given NPO status and blood glucose of 132  Will give D50 25 mL x1    · SSI  · Hypoglycemia protocol  · POCT glucose checks with meals and before bedtime  · Diabetic diet      GERD (gastroesophageal reflux disease)  Assessment & Plan  · Continue Mylanta and Tums prn    Mixed hyperlipidemia  Assessment & Plan  · Continue Lipitor 40 mg p o  Daily with dinner      VTE Pharmacologic Prophylaxis: VTE Score: 3 Moderate Risk (Score 3-4) - Pharmacological DVT Prophylaxis Ordered: heparin  Patient Centered Rounds: I performed bedside rounds with nursing staff today  Discussions with Specialists or Other Care Team Provider: Cardiology    Education and Discussions with Family / Patient: Updated  (wife) at bedside  Current Length of Stay: 1 day(s)  Current Patient Status: Inpatient   Discharge Plan: Anticipate discharge in 24-48 hrs to discharge location to be determined pending rehab evaluations  Code Status: Level 1 - Full Code    Subjective:   Patient appeared comfortable  Reports that he continues to have 4/10 mid-sternal chest pain that does not radiate   The pain is not relieved or aggravated by any behaviors and is unrelated to eating  He still feels nauseous but not as much as yesterday  He states that he woke up intermittently throughout the night, but his wife says that is normal for him  Denies dizziness, headache, shortness of breath, vomiting, abdominal pain, or urinary or bowel incontinence  Reports slight left lower extremity swelling from a healing toe fracture  Objective:     Vitals:   Temp (24hrs), Av 3 °F (36 8 °C), Min:98 2 °F (36 8 °C), Max:98 6 °F (37 °C)    Temp:  [98 2 °F (36 8 °C)-98 6 °F (37 °C)] 98 2 °F (36 8 °C)  HR:  [69-76] 69  Resp:  [16] 16  BP: (108-149)/(49-71) 122/71  SpO2:  [94 %-98 %] 98 %  There is no height or weight on file to calculate BMI  Input and Output Summary (last 24 hours):   No intake or output data in the 24 hours ending 22 1206    Physical Exam:   Physical Exam  Vitals and nursing note reviewed  Constitutional:       Appearance: He is well-developed  HENT:      Head: Normocephalic and atraumatic  Eyes:      Conjunctiva/sclera: Conjunctivae normal    Cardiovascular:      Rate and Rhythm: Normal rate and regular rhythm  Pulmonary:      Effort: Pulmonary effort is normal  No respiratory distress  Breath sounds: Normal breath sounds  Abdominal:      Palpations: Abdomen is soft  Tenderness: There is no abdominal tenderness  Musculoskeletal:      Cervical back: Neck supple  Skin:     General: Skin is warm and dry  Neurological:      Mental Status: He is alert and oriented to person, place, and time     Psychiatric:         Mood and Affect: Mood normal          Additional Data:     Labs:  Results from last 7 days   Lab Units 22  0609   WBC Thousand/uL 6 13   HEMOGLOBIN g/dL 10 2*   HEMATOCRIT % 31 8*   PLATELETS Thousands/uL 137*   NEUTROS PCT % 59   LYMPHS PCT % 26   MONOS PCT % 11   EOS PCT % 2     Results from last 7 days   Lab Units 22  0511 22  0609 22  0609   SODIUM mmol/L 135*   < > 136   POTASSIUM mmol/L 5 1   < > 4 8   CHLORIDE mmol/L 101   < > 100   CO2 mmol/L 20*   < > 24   BUN mg/dL 79*   < > 66*   CREATININE mg/dL 9 19*   < > 8 37*   ANION GAP mmol/L 14*   < > 12   CALCIUM mg/dL 8 7   < > 8 6   ALBUMIN g/dL  --   --  3 4*   TOTAL BILIRUBIN mg/dL  --   --  0 37   ALK PHOS U/L  --   --  113   ALT U/L  --   --  19   AST U/L  --   --  8   GLUCOSE RANDOM mg/dL 126   < > 128    < > = values in this interval not displayed       Results from last 7 days   Lab Units 04/29/22  1015   INR  1 01     Results from last 7 days   Lab Units 05/02/22  1100 05/02/22  0743 05/01/22  2134 05/01/22  1620 05/01/22  1107 05/01/22  0748 05/01/22  0308 04/30/22  1131 04/30/22  0756 04/29/22  1036   POC GLUCOSE mg/dl 132 130 149* 145* 142* 128 155* 145* 135 144*     Results from last 7 days   Lab Units 04/30/22  0956   HEMOGLOBIN A1C % 6 0*           Lines/Drains:  Invasive Devices  Report    Peripheral Intravenous Line            Peripheral IV 05/01/22 Right Antecubital 1 day          Line            Hemodialysis AV Fistula Left Upper arm -- days                  Telemetry:  Telemetry Orders (From admission, onward)             48 Hour Telemetry Monitoring  Continuous x 48 hours        References:    Telemetry Guidelines   Question:  Reason for 48 Hour Telemetry  Answer:  Acute MI, chest pain - R/O MI, or unstable angina                 Telemetry Reviewed: Normal Sinus Rhythm  Indication for Continued Telemetry Use: Acute MI/Unstable Angina/Rule out ACS              Imaging: Reviewed radiology reports from this admission including: chest xray    Recent Cultures (last 7 days):         Last 24 Hours Medication List:   Current Facility-Administered Medications   Medication Dose Route Frequency Provider Last Rate    acetaminophen  650 mg Oral Q6H PRN Geri Martini MD      aluminum-magnesium hydroxide-simethicone  30 mL Oral Q4H PRN Geri Martini MD      aspirin  81 mg Oral Daily Geri Martini MD      atorvastatin  40 mg Oral Daily With Reny Mix MD      calcium acetate  667 mg Oral TID With Meals Kelli Britton MD      calcium carbonate  500 mg Oral Daily PRN Kelli Britton MD      carvedilol  12 5 mg Oral BID With Meals Kelli Britton MD      clopidogrel  600 mg Oral Once BRITTANY Banda      heparin (porcine)  5,000 Units Subcutaneous Q8H [address] [facility] Yan[facility]      insulin glargine  14 Units Subcutaneous Daily Kelli Britton MD      insulin lispro  1-6 Units Subcutaneous TID [facility] Kelli Britton MD      insulin lispro  4 Units Subcutaneous TID With Meals Kelli Britton MD      LORazepam  0 5 mg Oral Q12H PRN Darrion Heard MD      meclizine  25 mg Oral Q8H PRN Kelli Britton MD      nitroglycerin  0 4 mg Sublingual Once Kelli Britton MD      nitroglycerin  0 4 mg Sublingual Q5 Min PRN BRITTANY Banda      torsemide  10 mg Oral Once per day on Mon Wed Fri Kelli Britton MD      torsemide  50 mg Oral Every Other Suhas Hayden MD          Today, Patient Was Seen By: Graham Orozco MD    **Please Note: This note may have been constructed using a voice recognition system  **

## 2022-05-02 NOTE — ASSESSMENT & PLAN NOTE
Patient presented with chest pain that was 8/10, constant, nonradiating sternal dull pressure  S/S improved with ASA and nitroglycerin  ECG showed NSR with HR of 84 and   Troponin level 78    Plan:  · Continue ASA 81 mg p o  Daily  · Load with Plavix 600 mg prior to cardiac catheterization today (to be done after HD session)  · Nitroglycerin 0 4 mg SL p r n    · Telemetry on 5/2- unremarkable for acute ischemic changes, rate 70s  · Cardiac catheterization planned for 5/2/22 afternoon

## 2022-05-02 NOTE — DISCHARGE INSTRUCTIONS
1  Please see the post cardiac catheterization/ angioseal closure device instructions and stent booklet  No heavy lifting, greater than 10 lbs  or strenuous  activity for 48 hrs  See the post cardiac catheterization/ angioseal closure device instructions  2 Remove band aid tomorrow  Shower and wash area- wrist gently with soap and water- beginning tomorrow  Rinse and pat dry  Apply new water seal band aid  Repeat this process for 5 days  No powders, creams lotions or antibiotic ointments  for 5 days  No tub baths, hot tubs or swimming for 5 days  3 No driving for 2 days    4  Do not stop aspirin or plavix (clopidigrel) for any reason without a cardiologists consent, or the stent could block up and cause a heart attack  Left Heart Catheterization   WHAT YOU NEED TO KNOW:   A left heart catheterization is a procedure to look at your heart and its arteries  You may need this procedure if you have chest pain, heart disease, or your heart is not working as it should  DISCHARGE INSTRUCTIONS:   Call 911 for any of the following:   · You have any of the following signs of a heart attack:      ? Squeezing, pressure, or pain in your chest     ? and  any of the following:     ? Discomfort or pain in your back, neck, jaw, stomach, or arm     ? Shortness of breath    ? Nausea or vomiting    ? Lightheadedness or a sudden cold sweat    · You have any of the following signs of a stroke:      ? Numbness or drooping on one side of your face     ? Weakness in an arm or leg    ? Confusion or difficulty speaking    ? Dizziness, a severe headache, or vision loss    Seek care immediately if:   · Your arm or leg feels warm, tender, and painful  It may look swollen and red  · The leg or arm used for your angiogram is numb, painful, or changes color  · The bruise at your catheter site gets bigger or becomes swollen  · Your wound does not stop bleeding even after you apply firm pressure for 15 minutes      · You have weakness in an arm or leg  · You become confused or have difficulty speaking  · You have dizziness, a severe headache, or vision loss  Contact your healthcare provider if:   · You have a fever  · Your catheter site is red, leaks pus, or smells bad  · You have increasing pain at your catheter site  · You have questions or concerns about your condition or care  Limit activity as directed:   · Avoid unnecessary stair climbing for 48 hours, if a catheter was put in your groin  · Do not place pressure on your arm, hand, or wrist, if the catheter was placed in your wrist  Avoid pushing, pulling, or heavy lifting with that arm  · If you need to cough, support the area where the catheter was inserted with your hand  · Ask your healthcare provider how long you need to limit movement and avoid certain activities  · You may feel like resting more after your procedure  Slowly start to do more each day  Rest when you feel it is needed  Keep your bandage clean and dry:  Ask your healthcare provider when you can bathe and shower  Do not take baths or go in pools or hot tubs  Check your site every day for signs of infection such as swelling, redness, or pus  Drink liquids as directed:  Liquids help flush the dye used for your procedure out of your body  Ask your healthcare provider how much liquid to drink each day, and which liquids to drink  Some foods, such as soup and fruit, also provide liquid  Limit alcohol:  Do not drink alcohol for 24 hours after your procedure  Then limit alcohol  Women should limit alcohol to 1 drink a day  Men should limit alcohol to 2 drinks a day  A drink of alcohol is 12 ounces of beer, 5 ounces of wine, or 1½ ounces of liquor  Do not smoke:  Nicotine and other chemicals in cigarettes and cigars can damage your blood vessels  Ask your healthcare provider for information if you currently smoke and need help to quit   E-cigarettes or smokeless tobacco still contain nicotine  Talk to your healthcare provider before you use these products  Follow up with your healthcare provider as directed:  Write down your questions so you remember to ask them during your visits  © Copyright [employer] 2018 Information is for End User's use only and may not be sold, redistributed or otherwise used for commercial purposes  All illustrations and images included in CareNotes® are the copyrighted property of A [employer] M [employer]  or [facility] Chris Kelly   The above information is an  only  It is not intended as medical advice for individual conditions or treatments  Talk to your doctor, nurse or pharmacist before following any medical regimen to see if it is safe and effective for you

## 2022-05-02 NOTE — PROGRESS NOTES
NEPHROLOGY PROGRESS NOTE   Charlene Morales 58 y o  male MRN: [id number]  Unit/Bed#: S -01 Encounter: [id number]  Reason for Consult:  Management of ESRD    ASSESSMENT AND PLAN:  59 yo with PMH of ESRD on HD MWF at [address], HTN, anemia, hyperparathyroidism presents chest pain  Recent admission on 4/21 for right-sided pain   Nephrology is consulted for management of     PLAN:    #ESRD on HD MWF:  · Dialysis [facility]/days:[facility]  · Access:  Left AV fistula  · Next HD today before cardiac catheterization  · Renal Diet  · Fluid restriction 1-1 5L/d  · Adjust medications to GFR<10  · Avoid opioids     #Volume status/hypertension:  · Volume:  Euvolemic  · Blood pressure:  Normotensive /71mmh, goal< of 40/90  · Low-sodium diet  · Coreg 12 5 mg b i d  · Torsemide 50 mg on non HD days    # hyponatremia  · Serum sodium 135 mEq per L  · Likely dilutional secondary to ESRD    #Secondary Hyperparasitoidism   · iPTH levels and vitamin D as an outpatient  · Continue PhosLo 667 mg t i d  With meals        # Anemia of Kidney Disease  · Current hemoglobin:  10 2 mg/dL  · Treatment:  · Not on EPO at this time  · Transfuse for hemoglobin less than 7 0 per primary service      # chest pain  · Cardiac catheterization today    SUBJECTIVE:  Patient seen and examined at bedside  No chest pain, shortness of breath, nausea, vomiting, abdominal pain or diarrhea          OBJECTIVE:  Current Weight:    Vitals:    05/02/22 0923   BP: 122/71   Pulse: 69   Resp:    Temp:    SpO2: 98%     No intake or output data in the 24 hours ending 05/02/22 1051  Wt Readings from Last 3 Encounters:   04/29/22 107 kg (236 lb 1 8 oz)   04/21/22 108 kg (239 lb)   04/20/22 106 kg (233 lb 11 oz)     Temp Readings from Last 3 Encounters:   05/02/22 98 2 °F (36 8 °C)   04/30/22 98 3 °F (36 8 °C) (Oral)   04/21/22 98 2 °F (36 8 °C)     BP Readings from Last 3 Encounters:   05/02/22 122/71   04/30/22 143/74   04/21/22 151/78     Pulse Readings from Last 3 Encounters:   05/02/22 69   04/30/22 72   04/21/22 76        General:  no acute distress at this time  Skin:  No acute rash  Eyes:  No scleral icterus and noninjected  ENT:  mucous membranes moist  Neck:  no carotid bruits  Chest:  Clear to auscultation percussion, good respiratory effort, no use of accessory respiratory muscles  CVS:  Regular rate and rhythm without a murmur rub   Abdomen:  soft and nontender   Extremities:  No clubbing, no cyanosis, no significant lower extremity edema  Neuro:  No gross focality  Psych:  Alert , cooperative   Vascular access:  Left AV fistula with good bruit and thrill      Medications:    Current Facility-Administered Medications:     acetaminophen (TYLENOL) tablet 650 mg, 650 mg, Oral, Q6H PRN, Migue Peña MD    aluminum-magnesium hydroxide-simethicone (MYLANTA) oral suspension 30 mL, 30 mL, Oral, Q4H PRN, Migue Peña MD    aspirin (ECOTRIN LOW STRENGTH) EC tablet 81 mg, 81 mg, Oral, Daily, Migeu Peña MD    aspirin chewable tablet 324 mg, 324 mg, Oral, Once, BRITTANY Guzmán    atorvastatin (LIPITOR) tablet 40 mg, 40 mg, Oral, Daily With Lauren Peters MD, 40 mg at 05/01/22 1614    calcium acetate (PHOSLO) capsule 667 mg, 667 mg, Oral, TID With Meals, Migue Peña MD, 667 mg at 05/01/22 1619    calcium carbonate (TUMS) chewable tablet 500 mg, 500 mg, Oral, Daily PRN, Migue Peña MD    carvedilol (COREG) tablet 12 5 mg, 12 5 mg, Oral, BID With Meals, Migue Peña MD, 12 5 mg at 05/02/22 8820    clopidogrel (PLAVIX) tablet 600 mg, 600 mg, Oral, Once, BRITTANY Guzmán    dextrose 50 % IV solution 25 mL, 25 mL, Intravenous, Once, Leanne Gibbs MD    heparin (porcine) subcutaneous injection 5,000 Units, 5,000 Units, Subcutaneous, Q8H [address], 5,000 Units at 05/02/22 0511 **AND** [CANCELED] Platelet count, , , Once, Migue Peña MD    insulin glargine (LANTUS) subcutaneous injection 14 Units 0 14 mL, 14 Units, Subcutaneous, Daily, Thu Zabala MD, 14 Units at 05/01/22 0806    insulin lispro (HumaLOG) 100 units/mL subcutaneous injection 1-6 Units, 1-6 Units, Subcutaneous, TID AC **AND** Fingerstick Glucose (POCT), , , 4x Daily AC and at bedtime, Thu Zabala MD    insulin lispro (HumaLOG) 100 units/mL subcutaneous injection 4 Units, 4 Units, Subcutaneous, TID With Meals, Thu Zabala MD, 4 Units at 05/01/22 1620    LORazepam (ATIVAN) tablet 0 5 mg, 0 5 mg, Oral, Q12H PRN, Maggy Craft MD    meclizine (ANTIVERT) tablet 25 mg, 25 mg, Oral, Q8H PRN, Thu Zabala MD    nitroglycerin (NITROSTAT) SL tablet 0 4 mg, 0 4 mg, Sublingual, Once, Thu Zabala MD    nitroglycerin (NITROSTAT) SL tablet 0 4 mg, 0 4 mg, Sublingual, Q5 Min PRN, BRITTANY Dickinson    torsemide [facility]) tablet 10 mg, 10 mg, Oral, Once per day on Mon Wed Fri, Thu Zabala MD, 10 mg at 05/02/22 9350    torsemide (DEMADEX) tablet 50 mg, 50 mg, Oral, Every Other Day, Thu Zabala MD, 50 mg at 05/01/22 0802    Laboratory Results:  Results from last 7 days   Lab Units 05/02/22  0511 05/01/22  0609 05/01/22  0334 04/30/22  0956 04/30/22  0453 04/29/22  1015   WBC Thousand/uL  --  6 13 6 10 6 72  --  6 19   HEMOGLOBIN g/dL  --  10 2* 10 5* 11 1*  --  11 3*   HEMATOCRIT %  --  31 8* 32 8* 34 5*  --  35 7*   PLATELETS Thousands/uL  --  137* 140* 146*  --  162   SODIUM mmol/L 135* 136 139  --  131* 141   POTASSIUM mmol/L 5 1 4 8 4 5  --  5 7* 4 9   CHLORIDE mmol/L 101 100 102  --  98* 103   CO2 mmol/L 20* 24 26  --  17* 25   BUN mg/dL 79* 66* 69*  --  52* 62*   CREATININE mg/dL 9 19* 8 37* 8 66*  --  7 24* 8 06*   CALCIUM mg/dL 8 7 8 6 8 7  --  8 7 9 1   MAGNESIUM mg/dL  --  2 2  --   --   --   --    PHOSPHORUS mg/dL  --  6 4*  --   --   --   --        XR chest 1 view portable   Final Result by Nayely Woody MD (05/01 1128)      No acute cardiopulmonary disease                    Workstation performed: [id number]             Portions of the record may have been created with voice recognition software  Occasional wrong word or "sound a like" substitutions may have occurred due to the inherent limitations of voice recognition software  Read the chart carefully and recognize, using context, where substitutions have occurred

## 2022-05-02 NOTE — CONSULTS
Consultation - Cardiology Team One  Harris Leone 58 y o  male MRN: [id number]  Unit/Bed#: S -49 Encounter: [id number]    Inpatient consult to Cardiology  Consult performed by: BRITTANY Mcintosh  Consult ordered by: Josefina Hernández MD      Physician Requesting Consult: Josefina Hernández MD  Reason for Consult / Principal Problem: Chest pain    Assessment/ Plan    1  Chest pain  Presented with mid sternal chest pain relieved with SL NTG in ED  Hs troponin minimally elevated and flat- does not fit pattern or elevation typically associated with NSTEMI/ACS- appears somewhat chronically elevated likely related to ESRD  ECG with nonspecific T wave abnormality which is stable from previous ECGs  Multiple risk factors for CAD as well as admissions for CP and dizziness  Recent nuclear stress test showed inferior infarct vs artifact and echo showed normal LV function with inferior wall hypokinesis  Previously recommended to undergo cardiac catheterization but had chosen to do this as an outpatient  Now agreeable for inpatient cardiac cath  Continue ASA, load with clopidogrel prior to cath  Continue statin and beta blocker  Currently pain free but will order PRN SL nitroglycerin in case of recurrent chest pain  No need to repeat echo at this time, no need for IV heparin at this time  NPO for cardiac cath after HD today- discussed with nephrology team     2  HTN- isolated episodes of HTN shortly after admission but primarily controlled  On carvedilol 12 5 mg BID and torsemide  3  HLD- on atorvastatin 40 mg daily    4  Type 2 DM- A1c 6 0%, management per primary team    5  ESRD on HD- MWF schedule  Management per Nephrology    6  Hx of left MCA CVA- on ASA and statin    7  Moderate aortic stenosis- per recent echo    8  Nonischemic myocardial injury- troponin minimally elevated and flat with -2 hour delta  See plan for #1       History of Present Illness   HPI: Harris Leone is a 58y o  year old male with PMH of ESRD on HD, DM, HTN, HLD, hx of CVA, obesity and hx of vertigo  He follows with cardiologist Dr Fausto Gleason and was last seen on 4/7/22  He underwent outpatient nuclear stress test in April demonstrating inferior fixed defect with possible attenuation artifact versus infarct  Post-stress EF 46%  Echocardiogram 04/20/2022 showed LVEF 65% with grade 1 diastolic dysfunction, hypokinesis of the basal inferior and basal inferolateral walls, moderate AS with mild AI, mild MR, and a small pericardial effusion posterior to the heart  He has had several recent admissions for dizziness and chest pain in the setting of accelerated hypertension  Most recently just a couple days ago at which time he was seen by our Cardiology team   He had mild troponin elevation and his symptoms were largely suggestive of hypertensive emergency  He was offered inpatient cardiac catheterization due to his recurrent symptoms but patient declined, stating he would like to go home to discuss with his primary cardiologist prior to proceeding with cardiac catheterization  His symptoms had also improved with normalization of his blood pressure  He presented back to the ED yesterday morning with complaints of chest pain and now stating that he is agreeable to have cardiac catheterization done  His pain went completely away after sublingual nitroglycerin tablet in the ED  Initial blood pressure was 151/70 with some isolated elevated readings shortly after that but otherwise blood pressure generally well controlled  HS troponin minimally elevated in the 70s and flat with negative delta at 2 hours  He has been kept NPO and cardiology consulted for further evaluation and management of his chest pain and consideration for cardiac catheterization  EKG reviewed personally: NSR with RBBB, nonspecific T wave abnormality      Telemetry reviewed personally:  NSR with RBBB    Review of Systems   Constitutional: Negative for chills, diaphoresis, malaise/fatigue and weight gain  Cardiovascular: Positive for chest pain  Negative for dyspnea on exertion, leg swelling, orthopnea, palpitations and syncope  Respiratory: Negative for cough, shortness of breath, sleep disturbances due to breathing and sputum production  Gastrointestinal: Negative for bloating, nausea and vomiting  Neurological: Negative for dizziness, light-headedness and weakness  Psychiatric/Behavioral: The patient is nervous/anxious  All other systems reviewed and are negative  Historical Information   Past Medical History:   Diagnosis Date    Cere[facility]vascular [facility] 7/29/2018    Chronic kidney disease     Diabetes mellitus ([facility] )     GERD (gastroesophageal reflux disease)     Hypercholesteremia     Hyperlipidemia     Hypertension     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    Neuropathy     Obesity     Osteomyelitis ([facility] )     last assessed 11/4/16    PVC's (premature ventricular contractions)     sees cardiology Dr Jessica camargo    Stroke [facility])     last weeof July 2018 [address]    TIA (transient ischemic attack) 10/28/2018     Past Surgical History:   Procedure Laterality Date    ABDOMINAL SURGERY      CHOLECYSTECTOMY      Percutaneous    COLONOSCOPY      CYSTOSCOPY      OTHER SURGICAL HISTORY      "stimulator to control bowel movements"    MI ESOPHAGOGASTRODUODENOSCOPY TRANSORAL DIAGNOSTIC N/A 9/27/2016    Procedure: ESOPHAGOGASTRODUODENOSCOPY (EGD); Surgeon: Thalia Dunham MD;  [facility];   Service: Gastroenterology    MI LAP,CHOLECYSTECTOMY N/A 2/29/2016    Procedure: LAPAROSCOPIC CHOLECYSTECTOMY ;  Surgeon: Chevy Bright DO;  Location[facility];  Service: General    ROTATOR CUFF REPAIR Right     TOE AMPUTATION Right 10/28/2016    Procedure: 3RD TOE AMPUTATION ;  Surgeon: Jaden Gamino DPM;  Location[facility];  Service: Social History     Substance and Sexual Activity   Alcohol Use Not Currently     Social History     Substance and Sexual Activity   Drug Use No     Social History     Tobacco Use   Smoking Status Never Smoker   Smokeless Tobacco Never Used     Family History:   Family History   Problem Relation Age of Onset    Leukemia Mother     Liver disease Mother     Lung cancer Mother         heavy smoker - 3 ppd    Heart disease Father     Liver disease Father     Multiple myeloma Sister     Breast cancer Sister     Urolithiasis Family     Alcohol abuse Neg Hx     Depression Neg Hx     Drug abuse Neg Hx     Substance Abuse Neg Hx     Mental illness Neg Hx        Meds/Allergies   all current active meds have been reviewed and current meds:   Current Facility-Administered Medications   Medication Dose Route Frequency    acetaminophen (TYLENOL) tablet 650 mg  650 mg Oral Q6H PRN    aluminum-magnesium hydroxide-simethicone (MYLANTA) oral suspension 30 mL  30 mL Oral Q4H PRN    aspirin (ECOTRIN LOW STRENGTH) EC tablet 81 mg  81 mg Oral Daily    atorvastatin (LIPITOR) tablet 40 mg  40 mg Oral Daily With Dinner    calcium acetate (PHOSLO) capsule 667 mg  667 mg Oral TID With Meals    calcium carbonate (TUMS) chewable tablet 500 mg  500 mg Oral Daily PRN    carvedilol (COREG) tablet 12 5 mg  12 5 mg Oral BID With Meals    heparin (porcine) subcutaneous injection 5,000 Units  5,000 Units Subcutaneous Q8H [address]    insulin glargine (LANTUS) subcutaneous injection 14 Units 0 14 mL  14 Units Subcutaneous Daily    insulin lispro (HumaLOG) 100 units/mL subcutaneous injection 1-6 Units  1-6 Units Subcutaneous TID AC    insulin lispro (HumaLOG) 100 units/mL subcutaneous injection 4 Units  4 Units Subcutaneous TID With Meals    LORazepam (ATIVAN) tablet 0 5 mg  0 5 mg Oral Q12H PRN    meclizine (ANTIVERT) tablet 25 mg  25 mg Oral Q8H PRN    nitroglycerin (NITROSTAT) SL tablet 0 4 mg  0 4 mg Sublingual Once    torsemide (DEMADEX) tablet 10 mg  10 mg Oral Once per day on Mon Wed Fri    torsemide [facility]) tablet 50 mg  50 mg Oral Every Other Day          No Known Allergies    Objective   Vitals: Blood pressure 115/66, pulse 71, temperature 98 2 °F (36 8 °C), resp  rate 16, SpO2 94 %  , There is no height or weight on file to calculate BMI ,     Systolic (86YJG), GWF:935 , Min:108 , UCJ:123     Diastolic (05RFR), PFS:26, Min:49, Max:71      No intake or output data in the 24 hours ending 05/02/22 0907  Wt Readings from Last 3 Encounters:   04/29/22 107 kg (236 lb 1 8 oz)   04/21/22 108 kg (239 lb)   04/20/22 106 kg (233 lb 11 oz)     Invasive Devices  Report    Peripheral Intravenous Line            Peripheral IV 05/01/22 Right Antecubital 1 day          Line            Hemodialysis AV Fistula Left Upper arm -- days                Physical Exam  Vitals reviewed  Constitutional:       General: He is not in acute distress  Appearance: He is obese  Neck:      Vascular: No hepatojugular reflux or JVD  Cardiovascular:      Rate and Rhythm: Normal rate and regular rhythm  Heart sounds: Murmur heard  Systolic murmur is present  No friction rub  No gallop  Comments: Trace b/l LE edema  Pulmonary:      Effort: Pulmonary effort is normal  No respiratory distress  Breath sounds: No rales  Comments: Room air  Abdominal:      General: Bowel sounds are normal  There is no distension  Palpations: Abdomen is soft  Tenderness: There is no abdominal tenderness  Musculoskeletal:         General: No tenderness  Normal range of motion  Cervical back: Neck supple  Skin:     General: Skin is warm and dry  Findings: No erythema  Neurological:      Mental Status: He is alert and oriented to person, place, and time  Psychiatric:         Mood and Affect: Mood is anxious        Comments: tearful     LABORATORY RESULTS:      CBC with diff:   Results from last 7 days   Lab Units 05/01/22  0609 05/01/22  0334 04/30/22  0956 04/29/22  1015   WBC Thousand/uL 6 13 6 10 6 72 6 19   HEMOGLOBIN g/dL 10 2* 10 5* 11 1* 11 3*   HEMATOCRIT % 31 8* 32 8* 34 5* 35 7*   MCV fL 98 96 96 98   PLATELETS Thousands/uL 137* 140* 146* 162   MCH pg 31 4 30 8 30 7 31 0   MCHC g/dL 32 1 32 0 32 2 31 7   RDW % 14 6 14 7 14 5 14 3   MPV fL 10 5 10 6 10 0 10 6   NRBC AUTO /100 WBCs 0 0 0  --        CMP:  Results from last 7 days   Lab Units 05/02/22  0511 05/01/22  0609 05/01/22  0334 04/30/22  0453 04/29/22  1015   POTASSIUM mmol/L 5 1 4 8 4 5 5 7* 4 9   CHLORIDE mmol/L 101 100 102 98* 103   CO2 mmol/L 20* 24 26 17* 25   BUN mg/dL 79* 66* 69* 52* 62*   CREATININE mg/dL 9 19* 8 37* 8 66* 7 24* 8 06*   CALCIUM mg/dL 8 7 8 6 8 7 8 7 9 1   AST U/L  --  8 8  --   --    ALT U/L  --  19 20  --   --    ALK PHOS U/L  --  113 126*  --   --    EGFR ml/min/1 73sq m 5 6 5 7 6       BMP:  Results from last 7 days   Lab Units 05/02/22  0511 05/01/22  0609 05/01/22 0334 04/30/22 0453 04/29/22  1015   POTASSIUM mmol/L 5 1 4 8 4 5 5 7* 4 9   CHLORIDE mmol/L 101 100 102 98* 103   CO2 mmol/L 20* 24 26 17* 25   BUN mg/dL 79* 66* 69* 52* 62*   CREATININE mg/dL 9 19* 8 37* 8 66* 7 24* 8 06*   CALCIUM mg/dL 8 7 8 6 8 7 8 7 9 1       Lab Results   Component Value Date    CREATININE 9 19 (H) 05/02/2022    CREATININE 8 37 (H) 05/01/2022    CREATININE 8 66 (H) 05/01/2022       Lab Results   Component Value Date    NTBNP 1,859 (H) 01/02/2022    NTBNP 1,259 (H) 12/30/2020    NTBNP 1,579 (H) 12/26/2020          Results from last 7 days   Lab Units 05/01/22  0609   MAGNESIUM mg/dL 2 2          Results from last 7 days   Lab Units 04/30/22  0956   HEMOGLOBIN A1C % 6 0*              Results from last 7 days   Lab Units 04/29/22  1015   INR  1 01     Lipid Profile:   Lab Results   Component Value Date    CHOL 203 (H) 08/10/2016     Lab Results   Component Value Date    HDL 34 (L) 04/30/2022    HDL 28 (L) 04/19/2022    HDL 31 (L) 01/11/2020     Lab Results   Component Value Date    LDLCALC 25 2022    LDLCALC 25 2022    LDLCALC 20 2020     Lab Results   Component Value Date    TRIG 125 2022    TRIG 116 2022    TRIG 210 (H) 2020       Cardiac testing:   Results for orders placed during the hospital encounter of 10/05/20    Echo complete with contrast if indicated    Narrative  [facility] [address] [facility]  [phone]    Transthoracic Echocardiogram  2D, M-mode, Doppler, and Color Doppler    Study date:  06-Oct-2020    Patient: Shaina Knight  MR number: [id number]  Account number: [de-identified]  : 1960  Age: 61 years  Gender: Male  Status: Inpatient  Location: Bedside  Height: 70 in  Weight: 239 6 lb  BP: 165/ 80 mmHg    Indications: Shortness of breath  Diagnoses: R06 02 - Shortness of breath    Sonographer:  Marietta Hemphill RDCS  Primary Physician:  Naomi Knott DO  Referring Physician:  Omayra Amador MD  Group:  [facility]  Interpreting Physician:  Mary Jalloh MD    SUMMARY    LEFT VENTRICLE:  Systolic function was normal by visual assessment  Ejection fraction was estimated to be 60 %  There were no regional wall motion abnormalities  Wall thickness was moderately increased  Features were consistent with a pseudonormal left ventricular filling pattern, with concomitant abnormal relaxation and increased filling pressure (grade 2 diastolic dysfunction)  LEFT ATRIUM:  The atrium was mildly dilated  RIGHT ATRIUM:  The atrium was mildly dilated  MITRAL VALVE:  There was moderate annular calcification  There was mild regurgitation  AORTIC VALVE:  The valve was trileaflet  Leaflets exhibited normal thickness, moderate calcification, and moderately reduced cuspal separation  Transaortic velocity was increased due to valvular stenosis  There was mild to moderate stenosis  There was mild regurgitation      TRICUSPID VALVE:  There was trace regurgitation  PULMONIC VALVE:  There was mild regurgitation  HISTORY: PRIOR HISTORY: DM2  CKD4  Hypertension  CVA  GERD  Dementia  PROCEDURE: The procedure was performed at the bedside  This was a routine study  The transthoracic approach was used  The study included complete 2D imaging, M-mode, complete spectral Doppler, and color Doppler  The heart rate was 98 bpm,  at the start of the study  Image quality was adequate  LEFT VENTRICLE: Size was normal  Systolic function was normal by visual assessment  Ejection fraction was estimated to be 60 %  There were no regional wall motion abnormalities  Wall thickness was moderately increased  DOPPLER: The ratio  of early ventricular filling to atrial contraction velocities was within the normal range  Features were consistent with a pseudonormal left ventricular filling pattern, with concomitant abnormal relaxation and increased filling pressure  (grade 2 diastolic dysfunction)  RIGHT VENTRICLE: The size was normal  Systolic function was normal  DOPPLER: Systolic pressure was not estimated  LEFT ATRIUM: The atrium was mildly dilated  RIGHT ATRIUM: The atrium was mildly dilated  MITRAL VALVE: There was moderate annular calcification  Valve structure was normal  There was normal leaflet separation  DOPPLER: The transmitral velocity was within the normal range  There was no evidence for stenosis  There was mild  regurgitation  AORTIC VALVE: The valve was trileaflet  Leaflets exhibited normal thickness, moderate calcification, and moderately reduced cuspal separation  DOPPLER: Transaortic velocity was increased due to valvular stenosis  There was mild to moderate  stenosis  There was mild regurgitation  TRICUSPID VALVE: The valve structure was normal  There was normal leaflet separation  DOPPLER: The transtricuspid velocity was within the normal range  There was no evidence for stenosis  There was trace regurgitation      PULMONIC VALVE: Leaflets exhibited normal thickness, no calcification, and normal cuspal separation  DOPPLER: The transpulmonic velocity was within the normal range  There was mild regurgitation  PERICARDIUM: There was no pericardial effusion  AORTA: The root exhibited normal size  SYSTEMIC VEINS: IVC: The inferior vena cava was normal in size and course  Respirophasic changes were normal     SYSTEM MEASUREMENT TABLES    2D  %FS: 36 09 %  Ao Diam: 3 8 cm  EDV(Teich): 182 77 ml  EF(Teich): 64 8 %  ESV(Teich): 64 33 ml  IVSd: 1 57 cm  LA Area: 24 31 cm2  LA Diam: 4 63 cm  LVEDV MOD A4C: 192 34 ml  LVEF MOD A4C: 65 32 %  LVESV MOD A4C: 66 7 ml  LVIDd: 6 04 cm  LVIDs: 3 86 cm  LVLd A4C: 9 02 cm  LVLs A4C: 7 15 cm  LVOT Diam: 2 21 cm  LVPWd: 1 61 cm  RA Area: 17 56 cm2  RVIDd: 4 cm  SV MOD A4C: 125 64 ml  SV(Teich): 118 44 ml    CW  AV Env  Ti: 330 16 ms  AV MaxP 64 mmHg  AV VTI: 57 9 cm  AV Vmax: 2 58 m/s  AV Vmean: 1 76 m/s  AV meanP 38 mmHg  TR MaxP 37 mmHg  TR Vmax: 2 71 m/s    MM  TAPSE: 1 84 cm    PW  FATUMA (VTI): 1 27 cm2  FATUMA Vmax: 1 42 cm2  AVAI (VTI): 0 cm2/m2  AVAI Vmax: 0 cm2/m2  E' Sept: 0 07 m/s  E/E' Sept: 12 69  LVOT Env  Ti: 335 87 ms  LVOT VTI: 19 11 cm  LVOT Vmax: 0 95 m/s  LVOT Vmean: 0 57 m/s  LVOT maxPG: 3 63 mmHg  LVOT meanP 61 mmHg  LVSI Dopp: 32 42 ml/m2  LVSV Dopp: 73 27 ml  MV A [place]: 0 64 m/s  MV Dec [place]: 4 7 m/s2  MV DecT: 189 67 ms  MV E [place]lin: 0 89 m/s  MV E/A Ratio: 1 38  MV PHT: 55 01 ms  MVA By PHT: 4 cm2    [employer] Accredited Echocardiography Laboratory    Prepared and electronically signed by    Zaid Kimbrough MD  Signed 06-Oct-2020 16:31:49    Imaging: I have personally reviewed pertinent reports  and I have personally reviewed pertinent films in PACS  XR chest 1 view portable    Result Date: 2022  Narrative: CHEST INDICATION:   CP  COMPARISON:  Chest radiograph from 2022 and chest CT from 2022   EXAM PERFORMED/VIEWS:  XR CHEST PORTABLE FINDINGS: Mild cardiomegaly with heart size exaggerated by the portable projection  The lungs are clear  No pneumothorax or pleural effusion  Osseous structures appear within normal limits for patient age  Impression: No acute cardiopulmonary disease  Workstation performed: [id number]     CT head wo contrast    Result Date: 4/30/2022  Narrative: CT BRAIN - WITHOUT CONTRAST INDICATION:   Dizziness, non-specific dizziness  unable to get MRI  COMPARISON:  April 29, 2022 TECHNIQUE:  CT examination of the brain was performed  In addition to axial images, sagittal and coronal 2D reformatted images were created and submitted for interpretation  Radiation dose length product (DLP) for this visit:  [phone] mGy-cm   This examination, like all CT scans performed in the [facility], was performed utilizing techniques to minimize radiation dose exposure, including the use of iterative reconstruction and automated exposure control  IMAGE QUALITY:  Diagnostic  FINDINGS: PARENCHYMA: Decreased attenuation is noted in periventricular and subcortical white matter demonstrating an appearance that is statistically most likely to represent mild microangiopathic change; this appearance is similar when compared to most recent prior examination  Unchanged subcortical infarction in the right precentral gyrus  Unchanged chronic lacunar infarction involving left ventral medial thalamus and genu of the left internal capsule  Heavy atherosclerotic carotid and vertebral artery calcification again noted  No CT signs of acute infarction  No intracranial mass, mass effect or midline shift  No acute parenchymal hemorrhage  VENTRICLES AND EXTRA-AXIAL SPACES:  Normal for the patient's age  VISUALIZED ORBITS AND PARANASAL SINUSES:  Left maxillary sinus mucosal thickening  No sinus fluid levels  Normal aeration of mastoid air cells  Normal orbits   CALVARIUM AND EXTRACRANIAL SOFT TISSUES:  Normal      Impression: No acute intracranial abnormality  Chronic ischemic changes similar from previous examination  Workstation performed: [id number]     CT head without contrast    Result Date: 4/6/2022  Narrative: CT BRAIN - WITHOUT CONTRAST INDICATION:   dizziness  COMPARISON: CT dated 2/19/2020 and MRI dated 12/10/2019  TECHNIQUE:  CT examination of the brain was performed  In addition to axial images, sagittal and coronal 2D reformatted images were created and submitted for interpretation  Radiation dose length product (DLP) for this visit:  915 mGy-cm   This examination, like all CT scans performed in the [facility], was performed utilizing techniques to minimize radiation dose exposure, including the use of iterative reconstruction and automated exposure control  IMAGE QUALITY:  Diagnostic  FINDINGS: PARENCHYMA:  No intracranial mass, mass effect or midline shift  No CT signs of acute infarction  No acute parenchymal hemorrhage  Mild chronic microangiopathy  Stable small chronic cortical infarct in the posterior right frontal lobe  Stable focal encephalomalacia in the anterior left thalamus and genu of internal capsule  Extensive intracranial atherosclerotic calcifications  Ophthalmic arteries are also calcified  VENTRICLES AND EXTRA-AXIAL SPACES: Age appropriate volume loss  No hydrocephalus  VISUALIZED ORBITS AND PARANASAL SINUSES:  Orbits are unremarkable  Left maxillary sinus mucosal thickening and retention cysts  Carlyon Burows CALVARIUM AND EXTRACRANIAL SOFT TISSUES:  Normal      Impression: No acute intracranial pathology  Stable chronic ischemic changes  Workstation performed: [id number]     MRI brain wo contrast    Result Date: 4/20/2022  Narrative: MRI BRAIN WITHOUT CONTRAST INDICATION: Nausea vomiting and right sided paraesthesias  COMPARISON:   4/19/2022  TECHNIQUE:  Sagittal T1, axial T2, axial T1, axial [place] and axial diffusion imaging  IMAGE QUALITY:  Suboptimal exam due to significant patient motion artifact   FINDINGS: BRAIN PARENCHYMA: Chronic lacunar infarct in the left ventral medial thalamus  Small, chronic infarct in the right parietal lobe  No restricted diffusion to indicate an acute infarct  Susceptibility artifact throughout the basal ganglia consistent with metabolic or physiologic mineralization  No evidence of intracranial hemorrhage VENTRICLES:  No hydrocephalus  SELLA AND PITUITARY GLAND:  No sellar enlargement  ORBITS:  Limited evaluation  PARANASAL SINUSES:  Limited evaluation  VASCULATURE:  Not well evaluated  CALVARIUM AND SKULL BASE:  Limited evaluation  EXTRACRANIAL SOFT TISSUES:  Limited evaluation  Impression: Limited exam due to significant patient motion artifact  No evidence of acute infarct  Workstation performed: [id number]     XR chest 1 view    Result Date: 4/29/2022  Narrative: CHEST INDICATION:   chest pain  COMPARISON:  9/8/2021 EXAM PERFORMED/VIEWS:  XR CHEST 1 VIEW Single view FINDINGS: Development of mild vascular congestion Cardiomediastinal silhouette has become  enlarged No pneumothorax or pleural effusion  Osseous structures appear within normal limits for patient age  Impression: Development of cardiomegaly and mild vascular congestion Workstation performed: [id number]     Stress strip    Result Date: 4/14/2022  Narrative: Confirmed by Regina Means ([id number]),  Royer Marshall ([id number]) on 4/14/2022 1:04:48 PM    XR toe left great min 2 views    Result Date: 4/19/2022  Narrative: LEFT TOES INDICATION:   [id number]: Unspecified injury of left foot, initial encounter  COMPARISON:  2/16/2021  VIEWS:  XR TOE LEFT GREAT MIN 2 VIEWS FINDINGS: New longitudinally oriented lucency in the first distal phalanx that may extend through the distal articular surface medially that is suspicious for nondisplaced fracture  Old healed fifth proximal phalanx fracture  No lytic or blastic osseous lesion  Diffuse vascular calcifications  Soft tissues are otherwise unremarkable       Impression: Nondisplaced fracture first proximal phalanx  Findings concur with the preliminary report by the referring clinician already in PACS and/or our electronic record EPIC  Workstation performed: [id number]     XR follow up    Result Date: 4/20/2022  Narrative: ABDOMEN - NO CHARGE INDICATION:   BLADDER STIM UNIT Nausea vomiting and right sided paraesthesias  COMPARISON:  CTA chest CT abdomen pelvis with contrast January 2, 2022  VIEWS:  AP supine Images: 2 FINDINGS: Sacral stimulator lead is intact  Partially imaged nonobstructive bowel gas pattern  Mild colonic stool burden  Vascular calcifications  Degenerative changes of lumbar spine  Impression: Sacral stimulator lead is intact  Workstation performed: [id number]     CT stroke alert brain    Result Date: 4/29/2022  Narrative: CT BRAIN - STROKE ALERT PROTOCOL INDICATION:   headache, persistent dizziness, history of prior stroke  COMPARISON:  CTA head and neck 4/19/2022 TECHNIQUE:  CT examination of the brain was performed  In addition to axial images, coronal reformatted images were created and submitted for interpretation  Radiation dose length product (DLP) for this visit:  956 mGy-cm   This examination, like all CT scans performed in the [facility], was performed utilizing techniques to minimize radiation dose exposure, including the use of iterative reconstruction and automated exposure control  IMAGE QUALITY:  Diagnostic  FINDINGS:  PARENCHYMA: Stable cortical/subcortical infarct in the right precentral gyrus  Stable encephalomalacia involving left ventromedial left thalamus and genu of left internal capsule  No intracranial mass, mass effect or midline shift  No CT signs of acute infarction  No acute parenchymal hemorrhage  Severe valcular calcifications noted  VENTRICLES AND EXTRA-AXIAL SPACES:  Normal for patient's age  VISUALIZED ORBITS AND PARANASAL SINUSES:  Orbits are unremarkable    Left maxillary sinus mucosal thickening/retention cyst CALVARIUM AND EXTRACRANIAL SOFT TISSUES:   Normal      Impression: No acute intracranial CT abnormality  Stable sequela of old infarcts as described  I personally discussed this study with Dr Jim Huff on 4/29/2022 at 10:59 AM  Workstation performed: [id number]     CT stroke alert brain    Result Date: 4/19/2022  Narrative: CT BRAIN - STROKE ALERT PROTOCOL INDICATION:   Neuro deficit, acute, stroke suspected R sided numbness  COMPARISON:  CT brain dated April 6, 2022  TECHNIQUE:  CT examination of the brain was performed  In addition to axial images, coronal reformatted images were created and submitted for interpretation  Radiation dose length product (DLP) for this visit:  958 mGy-cm   This examination, like all CT scans performed in the [facility], was performed utilizing techniques to minimize radiation dose exposure, including the use of iterative reconstruction and automated exposure control  IMAGE QUALITY:  Diagnostic  FINDINGS:  PARENCHYMA:  No intracranial mass, mass effect or midline shift  No CT signs of acute infarction  No acute parenchymal hemorrhage  Mild chronic microangiopathy  Stable small chronic cortical infarct in the posterior right frontal lobe  Stable focal encephalomalacia in the anterior left thalamus and genu of internal capsule  Extensive intracranial atherosclerotic calcifications  There is a stable small focus of encephalomalacia at the posterior left occipital lobe likely the sequela of prior ischemia  VENTRICLES AND EXTRA-AXIAL SPACES:  Normal for patient's age  VISUALIZED ORBITS AND PARANASAL SINUSES:  Orbits appear normal   Mild scattered sinus mucosal thickening is noted  No fluid levels are seen  CALVARIUM AND EXTRACRANIAL SOFT TISSUES:   Normal      Impression: No acute intracranial abnormality  Stable chronic small vessel ischemic changes     I personally discussed this study with Sarah Sanon on 4/19/2022 at 6:44 AM  Workstation performed: [id number]     CTA stroke alert (head/neck)    Result Date: 4/29/2022  Narrative: CTA NECK AND BRAIN WITH CONTRAST INDICATION: headache, persistent dizziness, prior stroke COMPARISON:   CTA head and neck 4/19/2022 TECHNIQUE:   Post contrast imaging was performed after administration of iodinated contrast through the neck and brain  Post contrast axial 0 625 mm images timed to opacify the arterial system  3D rendering was performed on an independent workstation  MIP reconstructions performed  Coronal reconstructions were performed of the noncontrast portion of the brain  Radiation dose length product (DLP) for this visit:  710 mGy-cm   This examination, like all CT scans performed in the [facility], was performed utilizing techniques to minimize radiation dose exposure, including the use of iterative reconstruction and automated exposure control  IV Contrast:  85 mL of iohexol (OMNIPAQUE)  IMAGE QUALITY:   Diagnostic FINDINGS: CERVICAL VASCULATURE AORTIC ARCH AND GREAT VESSELS: Mild atherosclerotic calcification of aortic arch  Great vessel origins are patent without significant stenosis  Coronary artery atherosclerotic disease  RIGHT VERTEBRAL ARTERY CERVICAL SEGMENT: Limited assessment of the origin due to motion distorted image quality  Severe atherosclerotic narrowing at the origin  The vessel is patent throughout the neck without high-grade stenosis  LEFT VERTEBRAL ARTERY CERVICAL SEGMENT: Limited assessment of the origin due to motion distorted image quality in this region  Severe atherosclerotic narrowing at the origin  The vessel is patent throughout the neck without high-grade stenosis  RIGHT EXTRACRANIAL CAROTID SEGMENT:Partially calcified atherosclerotic plaque at the bifurcation and proximal internal carotid artery    No hemodynamically significant stenosis of cervical ICA by NASCET criteria ( less than 50%) Extensive atherosclerotic disease of external carotid artery branches  LEFT EXTRACRANIAL CAROTID SEGMENT: Partially calcified atherosclerotic plaque at the bifurcation and proximal internal carotid artery  No hemodynamically significant stenosis of cervical ICA by NASCET criteria ( less than 50%) Extensive atherosclerotic disease of external carotid artery branches  INTRACRANIAL VASCULATURE INTERNAL CAROTID ARTERIES: Atherosclerotic disease of intracranial internal carotid artery with stable severe stenosis at distal petrous and proximal cavernous segments of right ICA  Normal ophthalmic artery origins  ANTERIOR CIRCULATION:  Normal A1 segments  Bilateral anterior cerebral arteries are unremarkable  Normal anterior comminuting artery  MIDDLE CEREBRAL ARTERY CIRCULATION:  Bilateral M1 segments and major M2 branches are patent without critical stenosis  DISTAL VERTEBRAL ARTERIES: Stable high-grade atherosclerotic stenosis in the mid intradural left vertebral artery  Moderate atherosclerotic disease of right intradural vertebral artery  Bilateral PICA origins are patent  BASILAR ARTERY:  Basilar artery is unremarkable  Normal superior cerebellar arteries  POSTERIOR CEREBRAL ARTERIES:    Bilateral posterior cerebral arteries are patent without high-grade stenosis  Absent/hypoplastic posterior communicating arteries  DURAL VENOUS SINUSES:  Unremarkable  NON VASCULAR ANATOMY BONY STRUCTURES: No acute or aggressive appearing osseous abnormality  SOFT TISSUES OF THE NECK:  Unremarkable  THORACIC INLET:  Unremarkable  Impression: 1  No significant interval change compared to CTA 4/19/2022  2   Stable severe stenosis at distal petrous and proximal cavernous segments of right ICA  3   Stable high-grade stenosis of bilateral vertebral artery origins  High-grade atherosclerotic stenosis in the mid intradural left vertebral artery  Moderate atherosclerotic disease of right intradural vertebral artery   4   Atherosclerotic change of cervical carotid arteries (right greater than left) without hemodynamically significant stenosis (less than 50%)  5   Extensive atherosclerotic disease of bilateral external carotid arteries and branches  I personally discussed this study with Dr Francisca Mabry on 4/29/2022 at 10:59 AM   Workstation performed: [id number]     CTA stroke alert (head/neck)    Result Date: 4/19/2022  Narrative: CTA NECK AND BRAIN WITH CONTRAST INDICATION: Neuro deficit, acute, stroke suspected R sided numbness COMPARISON:   CTA had and neck dated October 28, 2018  TECHNIQUE:   Post contrast imaging was performed after administration of iodinated contrast through the neck and brain  Post contrast axial 0 625 mm images timed to opacify the arterial system  3D rendering was performed on an independent workstation  MIP reconstructions performed  Coronal reconstructions were performed of the noncontrast portion of the brain  Radiation dose length product (DLP) for this visit:  679 mGy-cm   This examination, like all CT scans performed in the [facility], was performed utilizing techniques to minimize radiation dose exposure, including the use of iterative reconstruction and automated exposure control  IV Contrast:  85 mL of iohexol (OMNIPAQUE)  IMAGE QUALITY:   Diagnostic FINDINGS: CERVICAL VASCULATURE AORTIC ARCH AND GREAT VESSELS:  Mild athersclerotic disease of the arch, proximal great vessels and visualized subclavian vessels  No significant stenosis  RIGHT VERTEBRAL ARTERY CERVICAL SEGMENT:  Mild calcified atherosclerotic plaque formation at the origin  Mild stenosis of the right vertebral artery origin  The vessel is normal in caliber throughout the neck  LEFT VERTEBRAL ARTERY CERVICAL SEGMENT:  Normal origin  The vessel is normal in caliber throughout the neck   RIGHT EXTRACRANIAL CAROTID SEGMENT:  Mild to moderate atherosclerotic disease of the distal common carotid artery and proximal cervical internal carotid artery without significant stenosis compared to the more distal ICA  LEFT EXTRACRANIAL CAROTID SEGMENT:  Moderate to moderate atherosclerotic disease of the distal common carotid artery and proximal cervical internal carotid artery without significant stenosis compared to the more distal ICA  NASCET criteria was used to determine the degree of internal carotid artery diameter stenosis  INTRACRANIAL VASCULATURE INTERNAL CAROTID ARTERIES:  There is moderate to severe atherosclerotic plaque formation within the cavernous and supraclinoid segments bilaterally, right side greater than left  This results in moderate stenosis of the right cavernous internal carotid artery  Normal ophthalmic artery origins  Normal ICA terminus  ANTERIOR CIRCULATION:  The right A1 segment is mildly hypoplastic when compared to the left     Normal anterior communicating artery  MIDDLE CEREBRAL ARTERY CIRCULATION:  The right middle cerebral artery is unremarkable    There is a short focus of moderate stenosis involving the proximal M1 segment of mild vessel irregularity  Again noted is mildly diminished flow involving the distal left MCA branches when compared to the right  DISTAL VERTEBRAL ARTERIES:  There is moderate calcified and noncalcified atherosclerotic plaque formation in the intracranial segments of the vertebral arteries bilaterally  There is segmental visualization of the intracranial left vertebral artery  There is a short segment severe stenosis of the proximal intracranial segment of the left vertebral artery  Vertebrobasilar junction is intact  BASILAR ARTERY:  Basilar artery is normal in caliber  Normal superior cerebellar arteries  POSTERIOR CEREBRAL ARTERIES: Both posterior cerebral arteries arises from the basilar tip  Moderate stenosis of the proximal P1 segment of the left posterior cerebral artery    The left posterior cerebral arteries otherwise normal   The right posterior cerebral artery is normal   The posterior communicating arteries are normal bilaterally  DURAL VENOUS SINUSES:  Normal  NON VASCULAR ANATOMY BONY STRUCTURES: Degenerative changes cervical spine  No acute osseous abnormality  SOFT TISSUES OF THE NECK:  Unremarkable  THORACIC INLET:  Unremarkable  Impression: No acute intracranial abnormality  No large vessel occlusion  Moderate atherosclerotic disease with moderate stenosis of the cavernous right internal carotid artery and severe stenosis of the intracranial left vertebral artery  Focus of moderate stenosis at the proximal left M1 segment  Chronic truncation of the terminal left middle cerebral artery territory branches  Moderate stenosis of the proximal P1 segment of the left posterior cerebral artery  Mild to moderate atherosclerotic plaquing of the bilateral carotid bulbs causing no hemodynamically significant stenosis  I personally reported the findings to Sarah Sanon on 4/19/2022 at 6:44 AM  Workstation performed: [id number]     Echo complete w/ contrast if indicated    Result Date: 4/20/2022  Narrative: Emaline Folds  Left Ventricle: Left ventricular cavity size is normal  Wall thickness is moderately increased  The left ventricular ejection fraction is 65%  Systolic function is normal  Diastolic function is mildly abnormal, consistent with grade I (abnormal) relaxation  There is moderate to severe concentric hypertrophy    The following segments are hypokinetic: basal inferior and basal inferolateral    All other segments are normal    Right Ventricle: Right ventricular cavity size is mildly dilated  Systolic function is normal    Left Atrium: The atrium is mildly dilated    Right Atrium: The atrium is mildly dilated    Aortic Valve: The aortic valve is trileaflet  The leaflets are severely thickened  The leaflets are moderately calcified  There is moderately reduced mobility  There is mild regurgitation  There is moderate stenosis (FATUMA 1 2 cm2/MG 12 mmHg/DI 0 34)  The aortic valve velocity is increased due to stenosis    Mitral Valve: There is severe annular calcification  There is mild regurgitation    Aorta: The aortic root is mildly dilated at 4 1 cm  The ascending aorta is mildly dilated at 4 0 cm    Pericardium: There is a small pericardial effusion posterior to the heart  The fluid exhibits no internal echoes  NM myocardial perfusion spect (rx stress and/or rest)    Result Date: 4/14/2022  Narrative: [facility]  Stress ECG: A pharmacological stress test was performed using regadenoson  The patient experienced no angina during the test    Stress ECG: The stress ECG is negative for ischemia after pharmacologic stress    Perfusion: There is a left ventricular perfusion defect that is medium in size with severe reduction in uptake present in the basal to mid inferior and inferolateral location(s) consistent with prior scar    Stress Function: Left ventricular function post-stress is normal  Post-stress ejection fraction is 46 %  There is a defect in the basal to mid inferior and inferolateral location(s)    Stress Combined Conclusion: Findings are consistent with inferolateral infarction  Thank you for allowing us to participate in this patient's care  This pt will follow up with Dr Keren Wilkerson once discharged  Counseling / Coordination of Care  Total floor / unit time spent today 45 minutes  Greater than 50% of total time was spent with the patient and / or family counseling and / or coordination of care  A description of the counseling / coordination of care: Review of history, current assessment, development of a plan  Code Status: Level 1 - Full Code    ** Please Note: Dragon 360 Dictation voice to text software may have been used in the creation of this document   **

## 2022-05-02 NOTE — QUICK NOTE
I spoke to this patient's family members (wife, kids) at bedside to provide a comprehensive clinical update  I answered all of their questions and addressed all of their concerns to the best of my ability and to their satisfaction

## 2022-05-02 NOTE — ASSESSMENT & PLAN NOTE
Na 135, K 5 1 noted on 5/2 labwork    Plan:  · Dialysis scheduled for 5/2  · Telemetry unremarkable for hyperkalemic electrographic changes  · Monitor BMP

## 2022-05-02 NOTE — TREATMENT PLAN
Discussed with cardiology  Patient is currently on dialysis however due to cardiac catheterization schedule, will plan to stop dialysis now after about 1 5hr of treatment then plan for dialysis treatment tomorrow  Charge dialysis nurse aware  Attending aware  Patient aware of need for dialysis tomorrow

## 2022-05-02 NOTE — ASSESSMENT & PLAN NOTE
Lab Results   Component Value Date    HGBA1C 6 0 (H) 04/30/2022       Recent Labs     05/01/22  1620 05/01/22  2134 05/02/22  0743 05/02/22  1100   POCGLU 145* 149* 130 132       Blood Sugar Average: Last 72 hrs:  (P) 140 [id number]     Home regimen includes Lantus 14 units SC q h s  lispro 4 units SC t i d  With meals and SSI  Plan:  · Lantus 14 units qhs  · Lispro 4 units t i d  With meals--> held on 05/02 a m  Given NPO status and blood glucose of 132  Will give D50 25 mL x1    · SSI  · Hypoglycemia protocol  · POCT glucose checks with meals and before bedtime    · Diabetic diet

## 2022-05-02 NOTE — ASSESSMENT & PLAN NOTE
Recent Labs     04/30/22  0956 05/01/22  0334 05/01/22  0609   HGB 11 1* 10 5* 10 2*     Etiology:  CKD  Not on EPO at this time  At baseline      Plan:  · Continue to monitor CBC  · Transfuse for hemoglobin < 7

## 2022-05-02 NOTE — PLAN OF CARE
Post-Dialysis RN Treatment Note    Blood Pressure:  Pre: 168/76 mm/Hg  Post: 168/72 mmHg   EDW: TBD   Weight:  Pre: 107 2 kg   Post: 106 0 kg   Mode of weight measurement: Bed Scale   Volume Removed: 1200 ml    Treatment duration: 90 minutes    NS given  No    Treatment shortened?  Yes, describe: Cardiac Cath scheduled   Medications given during Rx None Reported   Estimated Kt/V  Not Applicable   Access type: AV fistula   Access Issues: No    Report called to primary nurse   Jodi Peterson    Problem: METABOLIC, FLUID AND ELECTROLYTES - ADULT  Goal: Electrolytes maintained within normal limits  Description: INTERVENTIONS:  - Monitor labs and assess patient for signs and symptoms of electrolyte imbalances  - Administer electrolyte replacement as ordered  - Monitor response to electrolyte replacements, including repeat lab results as appropriate  - Instruct patient on fluid and nutrition as appropriate  Outcome: Progressing  Goal: Fluid balance maintained  Description: INTERVENTIONS:  - Monitor labs   - Monitor I/O and WT  - Instruct patient on fluid and nutrition as appropriate  - Assess for signs & symptoms of volume excess or deficit  Outcome: Progressing     Problem: HEMATOLOGIC - ADULT  Goal: Maintains hematologic stability  Description: INTERVENTIONS  - Assess for signs and symptoms of bleeding or hemorrhage  - Monitor labs  - Administer supportive blood products/factors as ordered and appropriate  Outcome: Progressing

## 2022-05-02 NOTE — PROGRESS NOTES
Verbal report called to Jakob Copeland, Jessica Urbano RN  Patient prepared for transfer to Jakob Copeland post procedure on a monitor  Patient tolerated procedure without any difficulty  Dr Rey Allred spoke with the patient post procedure  Patient with TR band filled with 12ml of air, site dry and intact  Patient offering no complaints at this time  Site to be verified with receiving RN  Any changes from above assessment will be documented by receiving RN

## 2022-05-02 NOTE — TELEPHONE ENCOUNTER
Patient is at [facility] scheduled for cardiac cath today and has question for you   Please call his son Pili Mills

## 2022-05-03 ENCOUNTER — APPOINTMENT (INPATIENT)
Dept: DIALYSIS | Facility: HOSPITAL | Age: 62
DRG: 246 | End: 2022-05-03
Attending: INTERNAL MEDICINE
Payer: MEDICARE

## 2022-05-03 VITALS
DIASTOLIC BLOOD PRESSURE: 50 MMHG | HEART RATE: 80 BPM | TEMPERATURE: 97.5 F | SYSTOLIC BLOOD PRESSURE: 138 MMHG | OXYGEN SATURATION: 92 % | RESPIRATION RATE: 16 BRPM

## 2022-05-03 LAB
ANION GAP SERPL CALCULATED.3IONS-SCNC: 15 MMOL/L (ref 4–13)
BASOPHILS # BLD AUTO: 0.03 THOUSANDS/ΜL (ref 0–0.1)
BASOPHILS NFR BLD AUTO: 1 % (ref 0–1)
BUN SERPL-MCNC: 70 MG/DL (ref 5–25)
CALCIUM SERPL-MCNC: 8.2 MG/DL (ref 8.3–10.1)
CHLORIDE SERPL-SCNC: 100 MMOL/L (ref 100–108)
CO2 SERPL-SCNC: 20 MMOL/L (ref 21–32)
CREAT SERPL-MCNC: 8.6 MG/DL (ref 0.6–1.3)
EOSINOPHIL # BLD AUTO: 0.13 THOUSAND/ΜL (ref 0–0.61)
EOSINOPHIL NFR BLD AUTO: 2 % (ref 0–6)
ERYTHROCYTE [DISTWIDTH] IN BLOOD BY AUTOMATED COUNT: 14.4 % (ref 11.6–15.1)
GFR SERPL CREATININE-BSD FRML MDRD: 5 ML/MIN/1.73SQ M
GLUCOSE SERPL-MCNC: 119 MG/DL (ref 65–140)
GLUCOSE SERPL-MCNC: 135 MG/DL (ref 65–140)
GLUCOSE SERPL-MCNC: 183 MG/DL (ref 65–140)
HCT VFR BLD AUTO: 32.3 % (ref 36.5–49.3)
HGB BLD-MCNC: 10.6 G/DL (ref 12–17)
IMM GRANULOCYTES # BLD AUTO: 0.04 THOUSAND/UL (ref 0–0.2)
IMM GRANULOCYTES NFR BLD AUTO: 1 % (ref 0–2)
LYMPHOCYTES # BLD AUTO: 1.12 THOUSANDS/ΜL (ref 0.6–4.47)
LYMPHOCYTES NFR BLD AUTO: 17 % (ref 14–44)
MCH RBC QN AUTO: 31.5 PG (ref 26.8–34.3)
MCHC RBC AUTO-ENTMCNC: 32.8 G/DL (ref 31.4–37.4)
MCV RBC AUTO: 96 FL (ref 82–98)
MONOCYTES # BLD AUTO: 0.71 THOUSAND/ΜL (ref 0.17–1.22)
MONOCYTES NFR BLD AUTO: 11 % (ref 4–12)
NEUTROPHILS # BLD AUTO: 4.49 THOUSANDS/ΜL (ref 1.85–7.62)
NEUTS SEG NFR BLD AUTO: 68 % (ref 43–75)
NRBC BLD AUTO-RTO: 0 /100 WBCS
PLATELET # BLD AUTO: 116 THOUSANDS/UL (ref 149–390)
PMV BLD AUTO: 10.7 FL (ref 8.9–12.7)
POTASSIUM SERPL-SCNC: 5 MMOL/L (ref 3.5–5.3)
RBC # BLD AUTO: 3.37 MILLION/UL (ref 3.88–5.62)
SODIUM SERPL-SCNC: 135 MMOL/L (ref 136–145)
WBC # BLD AUTO: 6.52 THOUSAND/UL (ref 4.31–10.16)

## 2022-05-03 PROCEDURE — 82948 REAGENT STRIP/BLOOD GLUCOSE: CPT

## 2022-05-03 PROCEDURE — 99239 HOSP IP/OBS DSCHRG MGMT >30: CPT | Performed by: INTERNAL MEDICINE

## 2022-05-03 PROCEDURE — 90935 HEMODIALYSIS ONE EVALUATION: CPT | Performed by: INTERNAL MEDICINE

## 2022-05-03 PROCEDURE — 85025 COMPLETE CBC W/AUTO DIFF WBC: CPT | Performed by: INTERNAL MEDICINE

## 2022-05-03 PROCEDURE — 99232 SBSQ HOSP IP/OBS MODERATE 35: CPT | Performed by: INTERNAL MEDICINE

## 2022-05-03 PROCEDURE — 97166 OT EVAL MOD COMPLEX 45 MIN: CPT

## 2022-05-03 PROCEDURE — 80048 BASIC METABOLIC PNL TOTAL CA: CPT

## 2022-05-03 RX ORDER — CLOPIDOGREL BISULFATE 75 MG/1
75 TABLET ORAL DAILY
Qty: 30 TABLET | Refills: 0 | Status: SHIPPED | OUTPATIENT
Start: 2022-05-04 | End: 2022-05-23 | Stop reason: SDUPTHER

## 2022-05-03 RX ORDER — NITROGLYCERIN 0.4 MG/1
0.4 TABLET SUBLINGUAL
Qty: 5 TABLET | Refills: 0 | Status: CANCELLED | OUTPATIENT
Start: 2022-05-03

## 2022-05-03 RX ADMIN — INSULIN LISPRO 4 UNITS: 100 INJECTION, SOLUTION INTRAVENOUS; SUBCUTANEOUS at 12:39

## 2022-05-03 RX ADMIN — CARVEDILOL 12.5 MG: 12.5 TABLET, FILM COATED ORAL at 08:35

## 2022-05-03 RX ADMIN — TORSEMIDE 50 MG: 20 TABLET ORAL at 08:35

## 2022-05-03 RX ADMIN — INSULIN LISPRO 1 UNITS: 100 INJECTION, SOLUTION INTRAVENOUS; SUBCUTANEOUS at 12:38

## 2022-05-03 RX ADMIN — HEPARIN SODIUM 5000 UNITS: 5000 INJECTION INTRAVENOUS; SUBCUTANEOUS at 05:20

## 2022-05-03 RX ADMIN — INSULIN GLARGINE 14 UNITS: 100 INJECTION, SOLUTION SUBCUTANEOUS at 08:34

## 2022-05-03 RX ADMIN — CLOPIDOGREL BISULFATE 75 MG: 75 TABLET ORAL at 08:35

## 2022-05-03 RX ADMIN — INSULIN LISPRO 4 UNITS: 100 INJECTION, SOLUTION INTRAVENOUS; SUBCUTANEOUS at 08:34

## 2022-05-03 RX ADMIN — CALCIUM ACETATE 667 MG: 667 CAPSULE ORAL at 08:35

## 2022-05-03 RX ADMIN — CALCIUM ACETATE 667 MG: 667 CAPSULE ORAL at 12:38

## 2022-05-03 RX ADMIN — ASPIRIN 81 MG: 81 TABLET, COATED ORAL at 08:34

## 2022-05-03 NOTE — ASSESSMENT & PLAN NOTE
Lab Results   Component Value Date    HGBA1C 6 0 (H) 04/30/2022     · Home regimen includes Lantus 14 units SC q h s  lispro 4 units SC t i d  With meals and SSI    · Continue home regimen upon discharge  · F/u with PCP upon discharge

## 2022-05-03 NOTE — PROGRESS NOTES
Progress Note - Cardiology Team 1  Esther Tellez 58 y o  male MRN: [id number]  Unit/Bed#: S -01 Encounter: [id number]        Principal Problem:    Chest pain  Active Problems:    Type 2 diabetes mellitus with chronic kidney disease on chronic dialysis, with long-term current use of insulin (HCC)    Mixed hyperlipidemia    GERD (gastroesophageal reflux disease)    Anemia    ESRD on dialysis ([address] )    Hypertension    Electrolyte abnormality      Assessment/Plan    1  CAD- s/p PCI to OM1 with ADE  Nonobstructive of LAD and mid circumflex  Severe stenosis of the small distal RCA beyond the origin of the PDA  DA PT- asa/plavix ( new med for him)  Discussed importance of compliance with patient and family members at bedside  Statin-atorvastatin 40 mg  Beta-blocker-carvedilol 12 5 mg b i d  F/u with Dr Suzi Moore    2  HTN-modestly controlled  Continue current medical management  Follow-up with Cardiology  3  HLD-continue statin      4  ESRD on HD    5  Lt MCA CVA    6  Moderate AS  Outpatient follow-up        Subjective/Objective   Chief Complaint/Subjective  Patient without complaints of chest pain or shortness of breath  Family at bedside  Reviewed medications and follow-up  Vitals: /71   Pulse 58   Temp 97 7 °F (36 5 °C)   Resp 17   SpO2 92%     There were no vitals filed for this visit    Orthostatic Blood Pressures      Most Recent Value   Blood Pressure 137/71 filed at 05/03/2022 8495   Patient Position - Orthostatic VS Lying filed at 05/03/2022 0743            Intake/Output Summary (Last 24 hours) at 5/3/2022 1116  Last data filed at 5/3/2022 0500  Gross per 24 hour   Intake 1240 ml   Output 1600 ml   Net -360 ml       Invasive Devices  Report    Peripheral Intravenous Line            Peripheral IV 05/01/22 Right Antecubital 2 days          Line            Hemodialysis AV Fistula Left Upper arm -- days                Current Facility-Administered Medications   Medication Dose Route Frequency    acetaminophen (TYLENOL) tablet 650 mg  650 mg Oral Q6H PRN    aluminum-magnesium hydroxide-simethicone (MYLANTA) oral suspension 30 mL  30 mL Oral Q4H PRN    aspirin (ECOTRIN LOW STRENGTH) EC tablet 81 mg  81 mg Oral Daily    atorvastatin (LIPITOR) tablet 40 mg  40 mg Oral Daily With Dinner    calcium acetate (PHOSLO) capsule 667 mg  667 mg Oral TID With Meals    calcium carbonate (TUMS) chewable tablet 500 mg  500 mg Oral Daily PRN    carvedilol (COREG) tablet 12 5 mg  12 5 mg Oral BID With Meals    clopidogrel (PLAVIX) tablet 75 mg  75 mg Oral Daily    heparin (porcine) subcutaneous injection 5,000 Units  5,000 Units Subcutaneous Q8H [facility]    insulin glargine (LANTUS) subcutaneous injection 14 Units 0 14 mL  14 Units Subcutaneous Daily    insulin lispro (HumaLOG) 100 units/mL subcutaneous injection 1-6 Units  1-6 Units Subcutaneous TID AC    insulin lispro (HumaLOG) 100 units/mL subcutaneous injection 4 Units  4 Units Subcutaneous TID With Meals    LORazepam (ATIVAN) tablet 0 5 mg  0 5 mg Oral Q12H PRN    meclizine (ANTIVERT) tablet 25 mg  25 mg Oral Q8H PRN    nitroglycerin (NITROSTAT) SL tablet 0 4 mg  0 4 mg Sublingual Once    nitroglycerin (NITROSTAT) SL tablet 0 4 mg  0 4 mg Sublingual Q5 Min PRN    torsemide (DEMADEX) tablet 10 mg  10 mg Oral Once per day on Mon Wed Fri    torsemide (DEMADEX) tablet 50 mg  50 mg Oral Every Other Day         Physical Exam: /71   Pulse 58   Temp 97 7 °F (36 5 °C)   Resp 17   SpO2 92%     General Appearance:    Alert, cooperative, no distress, appears stated age   Head:    Normocephalic, no scleral icterus   Eyes:    PERRL   Nose:   Nares normal, septum midline, no drainage    Throat:   Lips, mucosa, and tongue normal   Neck:   Supple, symmetrical, trachea midline,              Lungs:     Clear to auscultation bilaterally, respirations unlabored        Heart:    Regular rate and rhythm, S1 and S2 normal, no murmur, rub   or gallop Extremities:   Extremities- right wrist intact       Skin:   Skin warm   Neurologic:   Alert and oriented to person place and time                 Lab Results:   Recent Results (from the past 72 hour(s))   Fingerstick Glucose (POCT)    Collection Time: 04/30/22 11:31 AM   Result Value Ref Range    POC Glucose 145 (H) 65 - 140 mg/dl   Fingerstick Glucose (POCT)    Collection Time: 05/01/22  3:08 AM   Result Value Ref Range    POC Glucose 155 (H) 65 - 140 mg/dl   ECG 12 lead    Collection Time: 05/01/22  3:16 AM   Result Value Ref Range    Ventricular Rate 78 BPM    Atrial Rate 78 BPM    MD Interval 184 ms    QRSD Interval 156 ms    QT Interval 426 ms    QTC Interval 485 ms    P Axis 61 degrees    QRS Axis -9 degrees    T Wave Axis -18 degrees   CBC and differential    Collection Time: 05/01/22  3:34 AM   Result Value Ref Range    WBC 6 10 4 31 - 10 16 Thousand/uL    RBC 3 41 (L) 3 88 - 5 62 Million/uL    Hemoglobin 10 5 (L) 12 0 - 17 0 g/dL    Hematocrit 32 8 (L) 36 5 - 49 3 %    MCV 96 82 - 98 fL    MCH 30 8 26 8 - 34 3 pg    MCHC 32 0 31 4 - 37 4 g/dL    RDW 14 7 11 6 - 15 1 %    MPV 10 6 8 9 - 12 7 fL    Platelets 350 (L) 433 - 390 Thousands/uL    nRBC 0 /100 WBCs    Neutrophils Relative 59 43 - 75 %    Immat GRANS % 1 0 - 2 %    Lymphocytes Relative 27 14 - 44 %    Monocytes Relative 11 4 - 12 %    Eosinophils Relative 2 0 - 6 %    Basophils Relative 0 0 - 1 %    Neutrophils Absolute 3 55 1 85 - 7 62 Thousands/µL    Immature Grans Absolute 0 06 0 00 - 0 20 Thousand/uL    Lymphocytes Absolute 1 66 0 60 - 4 47 Thousands/µL    Monocytes Absolute 0 68 0 17 - 1 22 Thousand/µL    Eosinophils Absolute 0 13 0 00 - 0 61 Thousand/µL    Basophils Absolute 0 02 0 00 - 0 10 Thousands/µL   Comprehensive metabolic panel    Collection Time: 05/01/22  3:34 AM   Result Value Ref Range    Sodium 139 136 - 145 mmol/L    Potassium 4 5 3 5 - 5 3 mmol/L    Chloride 102 100 - 108 mmol/L    CO2 26 21 - 32 mmol/L    ANION GAP 11 4 - 13 mmol/L    BUN 69 (H) 5 - 25 mg/dL    Creatinine 8 66 (H) 0 60 - 1 30 mg/dL    Glucose 146 (H) 65 - 140 mg/dL    Calcium 8 7 8 3 - 10 1 mg/dL    AST 8 5 - 45 U/L    ALT 20 12 - 78 U/L    Alkaline Phosphatase 126 (H) 46 - 116 U/L    Total Protein 7 1 6 4 - 8 2 g/dL    Albumin 3 6 3 5 - 5 0 g/dL    Total Bilirubin 0 42 0 20 - 1 00 mg/dL    eGFR 5 ml/min/1 73sq m   HS Troponin 0hr (reflex protocol)    Collection Time: 05/01/22  3:34 AM   Result Value Ref Range    hs TnI 0hr 78 (H) "Refer to ACS Flowchart"- see link ng/L   ECG 12 lead    Collection Time: 05/01/22  3:37 AM   Result Value Ref Range    Ventricular Rate 82 BPM    Atrial Rate 82 BPM    WA Interval 176 ms    QRSD Interval 156 ms    QT Interval 416 ms    QTC Interval 486 ms    P Axis 73 degrees    QRS Axis -7 degrees    T Wave Axis 3 degrees   D-dimer, quantitative    Collection Time: 05/01/22  3:55 AM   Result Value Ref Range    D-Dimer, Quant 2 03 (H) <0 50 ug/ml FEU   ECG 12 lead    Collection Time: 05/01/22  6:01 AM   Result Value Ref Range    Ventricular Rate 79 BPM    Atrial Rate 79 BPM    WA Interval 174 ms    QRSD Interval 156 ms    QT Interval 412 ms    QTC Interval 472 ms    P Axis 70 degrees    QRS Axis 11 degrees    T Wave Axis -17 degrees   HS Troponin I 2hr    Collection Time: 05/01/22  6:09 AM   Result Value Ref Range    hs TnI 2hr 76 (H) "Refer to ACS Flowchart"- see link ng/L    Delta 2hr hsTnI -2 <20 ng/L   Comprehensive metabolic panel    Collection Time: 05/01/22  6:09 AM   Result Value Ref Range    Sodium 136 136 - 145 mmol/L    Potassium 4 8 3 5 - 5 3 mmol/L    Chloride 100 100 - 108 mmol/L    CO2 24 21 - 32 mmol/L    ANION GAP 12 4 - 13 mmol/L    BUN 66 (H) 5 - 25 mg/dL    Creatinine 8 37 (H) 0 60 - 1 30 mg/dL    Glucose 128 65 - 140 mg/dL    Calcium 8 6 8 3 - 10 1 mg/dL    Corrected Calcium 9 1 8 3 - 10 1 mg/dL    AST 8 5 - 45 U/L    ALT 19 12 - 78 U/L    Alkaline Phosphatase 113 46 - 116 U/L    Total Protein 6 7 6 4 - 8 2 g/dL Albumin 3 4 (L) 3 5 - 5 0 g/dL    Total Bilirubin 0 37 0 20 - 1 00 mg/dL    eGFR 6 ml/min/1 73sq m   Magnesium    Collection Time: 05/01/22  6:09 AM   Result Value Ref Range    Magnesium 2 2 1 6 - 2 6 mg/dL   Phosphorus    Collection Time: 05/01/22  6:09 AM   Result Value Ref Range    Phosphorus 6 4 (H) 2 3 - 4 1 mg/dL   CBC and differential    Collection Time: 05/01/22  6:09 AM   Result Value Ref Range    WBC 6 13 4 31 - 10 16 Thousand/uL    RBC 3 25 (L) 3 88 - 5 62 Million/uL    Hemoglobin 10 2 (L) 12 0 - 17 0 g/dL    Hematocrit 31 8 (L) 36 5 - 49 3 %    MCV 98 82 - 98 fL    MCH 31 4 26 8 - 34 3 pg    MCHC 32 1 31 4 - 37 4 g/dL    RDW 14 6 11 6 - 15 1 %    MPV 10 5 8 9 - 12 7 fL    Platelets 577 (L) 311 - 390 Thousands/uL    nRBC 0 /100 WBCs    Neutrophils Relative 59 43 - 75 %    Immat GRANS % 1 0 - 2 %    Lymphocytes Relative 26 14 - 44 %    Monocytes Relative 11 4 - 12 %    Eosinophils Relative 2 0 - 6 %    Basophils Relative 1 0 - 1 %    Neutrophils Absolute 3 68 1 85 - 7 62 Thousands/µL    Immature Grans Absolute 0 05 0 00 - 0 20 Thousand/uL    Lymphocytes Absolute 1 59 0 60 - 4 47 Thousands/µL    Monocytes Absolute 0 65 0 17 - 1 22 Thousand/µL    Eosinophils Absolute 0 12 0 00 - 0 61 Thousand/µL    Basophils Absolute 0 04 0 00 - 0 10 Thousands/µL   Fingerstick Glucose (POCT)    Collection Time: 05/01/22  7:48 AM   Result Value Ref Range    POC Glucose 128 65 - 140 mg/dl   HS Troponin I 4hr    Collection Time: 05/01/22  7:52 AM   Result Value Ref Range    hs TnI 4hr 78 (H) "Refer to ACS Flowchart"- see link ng/L    Delta 4hr hsTnI 0 <20 ng/L   Fingerstick Glucose (POCT)    Collection Time: 05/01/22 11:07 AM   Result Value Ref Range    POC Glucose 142 (H) 65 - 140 mg/dl   Fingerstick Glucose (POCT)    Collection Time: 05/01/22  4:20 PM   Result Value Ref Range    POC Glucose 145 (H) 65 - 140 mg/dl   Fingerstick Glucose (POCT)    Collection Time: 05/01/22  9:34 PM   Result Value Ref Range    POC Glucose 149 (H) 65 - 140 mg/dl   Basic metabolic panel    Collection Time: 05/02/22  5:11 AM   Result Value Ref Range    Sodium 135 (L) 136 - 145 mmol/L    Potassium 5 1 3 5 - 5 3 mmol/L    Chloride 101 100 - 108 mmol/L    CO2 20 (L) 21 - 32 mmol/L    ANION GAP 14 (H) 4 - 13 mmol/L    BUN 79 (H) 5 - 25 mg/dL    Creatinine 9 19 (H) 0 60 - 1 30 mg/dL    Glucose 126 65 - 140 mg/dL    Calcium 8 7 8 3 - 10 1 mg/dL    eGFR 5 ml/min/1 73sq m   Fingerstick Glucose (POCT)    Collection Time: 05/02/22  7:43 AM   Result Value Ref Range    POC Glucose 130 65 - 140 mg/dl   Fingerstick Glucose (POCT)    Collection Time: 05/02/22 11:00 AM   Result Value Ref Range    POC Glucose 132 65 - 140 mg/dl   POCT activated clotting time    Collection Time: 05/02/22  2:44 PM   Result Value Ref Range    Activated Clotting Time, i-STAT 348 (H) 89 - 137 sec    Specimen Type ARTERIAL    Fingerstick Glucose (POCT)    Collection Time: 05/02/22  5:19 PM   Result Value Ref Range    POC Glucose 140 65 - 140 mg/dl   Basic metabolic panel    Collection Time: 05/03/22  6:18 AM   Result Value Ref Range    Sodium 135 (L) 136 - 145 mmol/L    Potassium 5 0 3 5 - 5 3 mmol/L    Chloride 100 100 - 108 mmol/L    CO2 20 (L) 21 - 32 mmol/L    ANION GAP 15 (H) 4 - 13 mmol/L    BUN 70 (H) 5 - 25 mg/dL    Creatinine 8 60 (H) 0 60 - 1 30 mg/dL    Glucose 119 65 - 140 mg/dL    Calcium 8 2 (L) 8 3 - 10 1 mg/dL    eGFR 5 ml/min/1 73sq m   CBC and differential    Collection Time: 05/03/22  6:18 AM   Result Value Ref Range    WBC 6 52 4 31 - 10 16 Thousand/uL    RBC 3 37 (L) 3 88 - 5 62 Million/uL    Hemoglobin 10 6 (L) 12 0 - 17 0 g/dL    Hematocrit 32 3 (L) 36 5 - 49 3 %    MCV 96 82 - 98 fL    MCH 31 5 26 8 - 34 3 pg    MCHC 32 8 31 4 - 37 4 g/dL    RDW 14 4 11 6 - 15 1 %    MPV 10 7 8 9 - 12 7 fL    Platelets 022 (L) 182 - 390 Thousands/uL    nRBC 0 /100 WBCs    Neutrophils Relative 68 43 - 75 %    Immat GRANS % 1 0 - 2 %    Lymphocytes Relative 17 14 - 44 %    Monocytes Relative 11 4 - 12 %    Eosinophils Relative 2 0 - 6 %    Basophils Relative 1 0 - 1 %    Neutrophils Absolute 4 49 1 85 - 7 62 Thousands/µL    Immature Grans Absolute 0 04 0 00 - 0 20 Thousand/uL    Lymphocytes Absolute 1 12 0 60 - 4 47 Thousands/µL    Monocytes Absolute 0 71 0 17 - 1 22 Thousand/µL    Eosinophils Absolute 0 13 0 00 - 0 61 Thousand/µL    Basophils Absolute 0 03 0 00 - 0 10 Thousands/µL   Fingerstick Glucose (POCT)    Collection Time: 05/03/22  7:46 AM   Result Value Ref Range    POC Glucose 135 65 - 140 mg/dl   Fingerstick Glucose (POCT)    Collection Time: 05/03/22 11:11 AM   Result Value Ref Range    POC Glucose 183 (H) 65 - 140 mg/dl     · 1st Mrg lesion is 95% stenosed  · Mid LAD lesion is 50% stenosed  · Mid Cx lesion is 50% stenosed  · RPAV lesion is 90% stenosed  · Dist RCA lesion is 50% stenosed  · The angioplasty was pre-stent angioplasty  Successful PCI, OM1, with a reduction in stenosis from 95% to 0% following placement of a 2 5mm drug-eluting stent  There was non-critical disease of the LAD and mid circumflex  There was a severe stenosis of the small distal RCA beyond the origin of the PDA  Plan: DAPT, statin, beta blocker  Medical therapy of non-critical left system disease and diffuse severe disease of the small distal RCA          Left Ventricle: Left ventricular cavity size is normal  Wall thickness is moderately increased  The left ventricular ejection fraction is 65%  Systolic function is normal  Diastolic function is mildly abnormal, consistent with grade I (abnormal) relaxation  There is moderate to severe concentric hypertrophy    The following segments are hypokinetic: basal inferior and basal inferolateral     All other segments are normal     Right Ventricle: Right ventricular cavity size is mildly dilated  Systolic function is normal     Left Atrium: The atrium is mildly dilated    Right Atrium: The atrium is mildly dilated    Aortic Valve:  The aortic valve is trileaflet  The leaflets are severely thickened  The leaflets are moderately calcified  There is moderately reduced mobility  There is mild regurgitation  There is moderate stenosis (FATUMA 1 2 cm2/MG 12 mmHg/DI 0 34)  The aortic valve velocity is increased due to stenosis    Mitral Valve: There is severe annular calcification  There is mild regurgitation    Aorta: The aortic root is mildly dilated at 4 1 cm  The ascending aorta is mildly dilated at 4 0 cm    Pericardium: There is a small pericardial effusion posterior to the heart  The fluid exhibits no internal echoes  Imaging: I have personally reviewed pertinent reports  Tele- nsr      Counseling / Coordination of Care  Total time spent today 25 minutes  Greater than 50% of total time was spent with the patient and / or family counseling and / or coordination of care

## 2022-05-03 NOTE — DISCHARGE SUMMARY
[facility]  Discharge- Kellie Herrera 1960, 58 y o  male MRN: [id number]  Unit/Bed#: S -01 Encounter: [id number]  Primary Care Provider: Sabrina Tabares DO   Date and time admitted to hospital: 5/1/2022  3:03 AM    * Chest pain  Assessment & Plan  Patient presented with chest pain that was 8/10, constant, nonradiating sternal dull pressure  S/S improved with ASA and nitroglycerin  ECG showed NSR with HR of 84 and   Troponin level 78  Cardiac cath 5/2/22: PCI to OM1 with ADE  Nonobstructive of LAD and mid circumflex, severe stenosis of the small distal RCA beyond the origin of the PDA were also noted  Plan:  · Continue DAPT (ASA, Plavix), 12 5 mg BID Coreg and Lipitor upon discharge  · Follow up with Cards outpatient (Dr Moi Vincent)  Electrolyte abnormality  Assessment & Plan  Na 135, K 5 0 noted on 5/3 labwork  Telemetry unremarkable for hyperkalemic electrographic changes    Plan:  · Dialysis scheduled for 5/2  · Repeat BMP in a few days, follow up with PCP within 1 week of discharge    ESRD on dialysis [facility])  Assessment & Plan  Lab Results   Component Value Date    EGFR 5 05/03/2022    EGFR 5 05/02/2022    EGFR 6 05/01/2022    CREATININE 8 60 (H) 05/03/2022    CREATININE 9 19 (H) 05/02/2022    CREATININE 8 37 (H) 05/01/2022     Got HD in hospital on 5/2, 5/3  HD on MWF  Plan:  · F/u with Nephrology outpatient     Hypertension  Assessment & Plan  Blood Pressure: 138/50    BP was initially elevated when presenting to the ED at 189/92  Home regimen includes Coreg 12 5 mg p o  B i d , torsemide 50 mg p o  Daily on non dialysis days, torsemide 10 mg p o  Daily on dialysis days MWF  Plan upon discharge:  · Continue Coreg 12 mg p o  B i d   · Continue torsemide 50 mg p o  Daily on nondialysis days  · Continue torsemide 10 mg p o  Daily on dialysis days MWF      Anemia  Assessment & Plan  Recent Labs     05/01/22  0334 05/01/22  0609 05/03/22  0618   HGB 10 5* 10 2* 10 6*     Etiology:  CKD  Not on EPO at this time  At baseline  Plan:  · F/u with PCP outpatient, Nephrology outpatient    Type 2 diabetes mellitus with chronic kidney disease on chronic dialysis, with long-term current use of insulin (HCC)  Assessment & Plan  Lab Results   Component Value Date    HGBA1C 6 0 (H) 04/30/2022     · Home regimen includes Lantus 14 units SC q h s  lispro 4 units SC t i d  With meals and SSI  · Continue home regimen upon discharge  · F/u with PCP upon discharge      GERD (gastroesophageal reflux disease)  Assessment & Plan  · Continue Mylanta and Tums prn  · F/u with PCP within 1 week of discharge  Mixed hyperlipidemia  Assessment & Plan  · Continue Lipitor 40 mg p o  Daily with dinner      Medical Problems             Resolved Problems  Date Reviewed: 5/1/2022    None              Discharging Resident: Randall Sharp MD  Discharging Attending: Cady Rincon MD  PCP: Lino Valencia DO  Admission Date:   Admission Orders (From admission, onward)     Ordered        05/01/22 0529  Inpatient Admission  Once                      Discharge Date: 05/03/22    Consultations During Hospital Stay:  · Cardiology  · Nephrology     Procedures Performed:   · Cardiac cath w/ OM1 stenting (drug eluting stent placed)     Significant Findings / Test Results:   · Cardiac cath: 1st Mrg lesion is 95% stenosed  Mid LAD lesion is 50% stenosed  Mid Cx lesion is 50% stenosed  RPAV lesion is 90% stenosed  Dist RCA lesion is 50% stenosed  Incidental Findings:   · See cardiac catheterization findings    Test Results Pending at Discharge (will require follow up): · None     Outpatient Tests Requested:  · BMP within 1 week    Complications:  None    Reason for Admission:  Chest pain    Hospital Course:   Daren Kawasaki is a 58 y o  male patient who originally presented to the hospital on 5/1/2022 due to mid sternal chest pain relieved with SL NTG in ED   HS troponin was minimally elevated and flat- did not fit pattern or elevation typically associated with NSTEMI/ACS  ECG with nonspecific T wave abnormality which was stable from previous ECGs  However, patient had multiple risk factors for CAD as well as admissions for CP and dizziness  Chart review showed a recent nuclear stress test with demonstrated inferior infarct vs artifact and echo showed normal LV function with inferior wall hypokinesis  Patient was previously recommended to undergo cardiac catheterization but had chosen to do this as an outpatient  Was agreeable for inpatient cardiac cath during this admission  Patient underwent cardiac cath on 5/2/2 with PCI to OM1 with ADE  Nonobstructive of LAD and mid circumflex, severe stenosis of the small distal RCA beyond the origin of the PDA were also noted  Patient is advised to continue DAPT (ASA, Plavix), Coreg and Lipitor upon discharge  He is to follow up with Cards outpatient (Dr Geeta Greenberg)  He is advised to continue outpatient PT and follow up with PCP within 1 week of discharge  He also had mild hyponatremia upon discharge and is advised to get a BMP repeated in a few days and follow up with PCP within 1 week of discharge  Please see above list of diagnoses and related plan for additional information  Condition at Discharge: fair    Discharge Day Visit / Exam:   Subjective:  Patient seen and examined at bedside  Has no major complaints this morning and reports doing very well after his cardiac catheterization yesterday  Denies chest pain, palpitations, shortness of breath, abdominal pain, urinary symptoms, numbness or tingling, new onset weakness  He is looking forward to going home      Vitals: Blood Pressure: 138/50 (05/03/22 1517)  Pulse: 80 (05/03/22 1517)  Temperature: 97 5 °F (36 4 °C) (05/03/22 1517)  Temp Source: Tympanic (05/03/22 1517)  Respirations: 16 (05/03/22 1517)  SpO2: 92 % (05/03/22 3053)     Physical Exam   Vitals reviewed  General Examination: Sitting in chair, cooperative HEENT: Normocephalic, Atraumatic  Extraocular movements intact, PERRLA  CVS: S1, S2 noted  Lungs: CTA b/l  Abdomen: Soft, normal bowel sounds  Non distended, non tender  Ext: No edema noted  Psych: Though Process - logical    Neuro: A, Ox3  Follows simple, 3 steps commands  Appropriate antigravity strength in all 4 extremities proximally, distally  Discussion with Family: Updated  (son) at bedside  Discharge instructions/Information to patient and family:   See after visit summary for information provided to patient and family  Provisions for Follow-Up Care:  See after visit summary for information related to follow-up care and any pertinent home health orders  Disposition:   Home    Planned Readmission: none     Discharge Medications:  See after visit summary for reconciled discharge medications provided to patient and/or family        **Please Note: This note may have been constructed using a voice recognition system**

## 2022-05-03 NOTE — ASSESSMENT & PLAN NOTE
Blood Pressure: 138/50    BP was initially elevated when presenting to the ED at 189/92  Home regimen includes Coreg 12 5 mg p o  B i d , torsemide 50 mg p o  Daily on non dialysis days, torsemide 10 mg p o  Daily on dialysis days MWF  Plan upon discharge:  · Continue Coreg 12 mg p o  B i d   · Continue torsemide 50 mg p o  Daily on nondialysis days  · Continue torsemide 10 mg p o  Daily on dialysis days MWF

## 2022-05-03 NOTE — PROGRESS NOTES
NEPHROLOGY PROGRESS NOTE   Emma Dowd 58 y o  male MRN: [id number]  Unit/Bed#: S -01 Encounter: [id number]  Reason for Consult: ESRD    ASSESSMENT/PLAN:  1  ESRD on HD MWF @ [address]  - short HD today due to shortened treatment yesterday for scheduling of cardiac catheterization  - EDW 105kg  2  Access- left AVF  3  Anemia- hgb at goal  - continue venofer outpatient  - not on BAYLEE  4  Hypertension- BP acceptable overall, continue home regimen  5  Secondary Hyperparathyroidism, renal- continue hectorol and phoslo  6  Chest pain- s/p cardiac catheterization 5/2/22 with PCI  - now on plavix    Disposition:  Okay for discharge after short dialysis treatment today  Plan for 2hr  Discussed with dialysis nurse and primary team     SUBJECTIVE:  Patient feeling well  Denies acute complaints  Hoping to go home today after dialysis      OBJECTIVE:  Current Weight:    Vitals:    05/03/22 0330 05/03/22 0743 05/03/22 0750 05/03/22 0938   BP: 167/68 167/74 121/76 137/71   BP Location: Right leg Right arm     Pulse: 74 75 55 58   Resp: 18 18 16 17   Temp:  97 9 °F (36 6 °C)  97 7 °F (36 5 °C)   TempSrc:  Oral     SpO2: 94% 95% 92% 92%       Intake/Output Summary (Last 24 hours) at 5/3/2022 1222  Last data filed at 5/3/2022 0500  Gross per 24 hour   Intake 840 ml   Output 1600 ml   Net -760 ml     General: NAD  Skin: no rash  Eyes: anicteric  ENMT: mm moist  Neck: no masses  Respiratory: CTAB  Cardiac: RRR  Extremities: no edema  GI: soft nt nd  Neuro: alert awake  Psych: mood and affect appropriate    Medications:    Current Facility-Administered Medications:     acetaminophen (TYLENOL) tablet 650 mg, 650 mg, Oral, Q6H PRN, Josie Carlos MD    aluminum-magnesium hydroxide-simethicone (MYLANTA) oral suspension 30 mL, 30 mL, Oral, Q4H PRN, Josie Carlos MD    aspirin (ECOTRIN LOW STRENGTH) EC tablet 81 mg, 81 mg, Oral, Daily, Josie Carlos MD, 81 mg at 05/03/22 0834    atorvastatin (LIPITOR) tablet 40 mg, 40 mg, Oral, Daily With Rosenda Navarro MD, 40 mg at 05/02/22 1717    calcium acetate (PHOSLO) capsule 667 mg, 667 mg, Oral, TID With Meals, Sally Souza MD, 667 mg at 05/03/22 0835    calcium carbonate (TUMS) chewable tablet 500 mg, 500 mg, Oral, Daily PRN, Sally Souza MD    carvedilol (COREG) tablet 12 5 mg, 12 5 mg, Oral, BID With Meals, Sally Souza MD, 12 5 mg at 05/03/22 0835    clopidogrel (PLAVIX) tablet 75 mg, 75 mg, Oral, Daily, BRITTANY Campos, 75 mg at 05/03/22 0835    heparin (porcine) subcutaneous injection 5,000 Units, 5,000 Units, Subcutaneous, Q8H [address], 5,000 Units at 05/03/22 0520 **AND** [CANCELED] Platelet count, , , Once, Sally Souza MD    insulin glargine (LANTUS) subcutaneous injection 14 Units 0 14 mL, 14 Units, Subcutaneous, Daily, Sally Souza MD, 14 Units at 05/03/22 0834    insulin lispro (HumaLOG) 100 units/mL subcutaneous injection 1-6 Units, 1-6 Units, Subcutaneous, TID AC **AND** Fingerstick Glucose (POCT), , , 4x Daily AC and at bedtime, Sally Souza MD    insulin lispro (HumaLOG) 100 units/mL subcutaneous injection 4 Units, 4 Units, Subcutaneous, TID With Meals, Sally Souza MD, 4 Units at 05/03/22 0834    LORazepam (ATIVAN) tablet 0 5 mg, 0 5 mg, Oral, Q12H PRN, Garrett Mortensen MD    meclizine (ANTIVERT) tablet 25 mg, 25 mg, Oral, Q8H PRN, Sally Souza MD    nitroglycerin (NITROSTAT) SL tablet 0 4 mg, 0 4 mg, Sublingual, Once, Sally Souza MD    nitroglycerin (NITROSTAT) SL tablet 0 4 mg, 0 4 mg, Sublingual, Q5 Min PRN, BRITTANY Campos    torsemide [facility]) tablet 10 mg, 10 mg, Oral, Once per day on Mon Wed Fri, Sally Souza MD, 10 mg at 05/02/22 0928    torsemide (DEMADEX) tablet 50 mg, 50 mg, Oral, Every Other Day, Sally Souza MD, 50 mg at 05/03/22 1706    Laboratory Results:  Results from last 7 days   Lab Units 05/03/22  0618 05/02/22  4955 05/01/22  8006 05/01/22  0334 04/30/22  3192 04/30/22  0453 04/29/22  1015   WBC Thousand/uL 6 52  --  6 13 6 10 6 72  --  6 19   HEMOGLOBIN g/dL 10 6*  --  10 2* 10 5* 11 1*  --  11 3*   HEMATOCRIT % 32 3*  --  31 8* 32 8* 34 5*  --  35 7*   PLATELETS Thousands/uL 116*  --  137* 140* 146*  --  162   POTASSIUM mmol/L 5 0 5 1 4 8 4 5  --  5 7* 4 9   CHLORIDE mmol/L 100 101 100 102  --  98* 103   CO2 mmol/L 20* 20* 24 26  --  17* 25   BUN mg/dL 70* 79* 66* 69*  --  52* 62*   CREATININE mg/dL 8 60* 9 19* 8 37* 8 66*  --  7 24* 8 06*   CALCIUM mg/dL 8 2* 8 7 8 6 8 7  --  8 7 9 1   MAGNESIUM mg/dL  --   --  2 2  --   --   --   --    PHOSPHORUS mg/dL  --   --  6 4*  --   --   --   --      I have personally reviewed the blood work as stated above and in my note  I have personally reviewed cardiology note

## 2022-05-03 NOTE — ASSESSMENT & PLAN NOTE
Na 135, K 5 0 noted on 5/3 labwork  Telemetry unremarkable for hyperkalemic electrographic changes    Plan:  · Dialysis scheduled for 5/2  · Repeat BMP in a few days, follow up with PCP within 1 week of discharge

## 2022-05-03 NOTE — OCCUPATIONAL THERAPY NOTE
Occupational Therapy Evaluation     Patient Name: Patt OVALLES Date: 5/3/2022  Problem List  Principal Problem:    Chest pain  Active Problems:    Type 2 diabetes mellitus with chronic kidney disease on chronic dialysis, with long-term current use of insulin (HCC)    Mixed hyperlipidemia    GERD (gastroesophageal reflux disease)    Anemia    ESRD on dialysis ([facility] [address] [facility] )    Hypertension    Electrolyte abnormality    Past Medical History  Past Medical History:   Diagnosis Date    Cerebrovascular accident (CVA) due to thrombosis of left middle cerebral artery ([facility] ) 7/29/2018    Chronic kidney disease     Diabetes mellitus ([facility] )     GERD (gastroesophageal reflux disease)     Hypercholesteremia     Hyperlipidemia     Hypertension     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    Neuropathy     Obesity     Osteomyelitis ([facility] )     last assessed 11/4/16    PVC's (premature ventricular contractions)     sees cardiology Dr Torres camargo    Stroke [facility])     last weeof July 2018 [facility]    TIA (transient ischemic attack) 10/28/2018     Past Surgical History  Past Surgical History:   Procedure Laterality Date    ABDOMINAL SURGERY      CARDIAC CATHETERIZATION N/A 5/2/2022    Procedure: Cardiac Coronary Angiogram;  Surgeon: Lloyd Chowdary MD;  [facility] CARD[facility]; Service: Cardiology    CARDIAC CATHETERIZATION N/A 5/2/2022    Procedure: Cardiac pci;  Surgeon: Lloyd Chowdary MD;  [facility] CARD[facility]; Service: Cardiology    CHOLECYSTECTOMY      Percutaneous    COLONOSCOPY      CYSTOSCOPY      OTHER SURGICAL HISTORY      "stimulator to control bowel movements"    [place] ESOPHAGOGASTRODUODENOSCOPY TRANSORAL DIAGNOSTIC N/A 9/27/2016    Procedure: ESOPHAGOGASTRODUODENOSCOPY (EGD); Surgeon: Ligia Dupree MD;  Location: [facility] GI LAB;   Service: Gastroenterology    [place] LAP,CHOLECYSTECTOMY N/A 2/29/2016    Procedure: LAPAROSCOPIC CHOLECYSTECTOMY ;  Surgeon: Ashley Vargas DO; Location: [facility] Main OR;  Service: General    ROTATOR CUFF REPAIR Right     TOE AMPUTATION Right 10/28/2016    Procedure: 3RD TOE AMPUTATION ;  Surgeon: Kina Acevedo DPM;  Location: [facility] Main OR;  Service:              05/03/22 0917   OT Last Visit   OT Visit Date 05/03/22   Note Type   Note type Evaluation   Restrictions/Precautions   Weight Bearing Precautions Per Order No   Other Precautions Cognitive; Fall Risk   Pain Assessment   Pain Assessment Tool 0-10   Pain Score No Pain   Home Living   Type of Home House   Home Layout Multi-level  (bi-level home, 3 steps up/down)   Bathroom Shower/Tub Walk-in shower  (on 1st floor)   Bathroom Toilet Standard   Bathroom Equipment Grab bars in shower   Bathroom Accessibility Accessible   Additional Comments no use of AD at baseline   Prior Function   Lives With Spouse;Daughter  (2 daughters, son lives next door)   [address] Help From Family   ADL Assistance Independent   IADLs Needs assistance   Falls in the last 6 months 0   Vocational Retired   Comments (-)drives, family drives   Lifestyle   Autonomy PTA pt living with family in [place], pt (I) with ADLs and (A) with IADLs, (-)falls, (-)drives, no use of AD at baseline   Reciprocal Relationships supportive family   Service to Others retired    [address] enjoys playing cards and watching I dream of [place] "My wife is going to have to buy me bigger underwear"   ADL   Eating Assistance 7  Independent   Grooming Assistance 6  Modified Independent   Grooming Deficit Increased time to complete;Wash/dry hands   UB Bathing Assistance 6  Modified Independent   LB Bathing Assistance 5  Supervision/Setup   UB Dressing Assistance 6  Modified independent   UB Dressing Deficit   (doff/donning gown)   LB Dressing Assistance 5  Supervision/Setup   LB Dressing Deficit Supervision/safety  (loren/doffing underwear)   Toileting Assistance  5  Supervision/Setup   Toileting Deficit Clothing management down;Perineal hygiene;Clothing management up   Bed Mobility   Additional Comments OOB and in chair at start and end of session   Transfers   Sit to Stand 5  Supervision   Additional items Increased time required;Verbal cues   Stand to Sit 5  Supervision   Additional items Increased time required;Verbal cues   Functional Mobility   Functional Mobility 5  Supervision   Additional Comments functional household distance, no use of AD   Balance   Static Sitting Good   Dynamic Sitting Fair +   Static Standing Fair   Dynamic Standing Fair   Ambulatory Fair   Activity Tolerance   Activity Tolerance Patient tolerated treatment well   Medical Staff Made Aware THIERNO MCDANIEL Assessment   RUE Assessment WFL   LUE Assessment   LUE Assessment WFL   Hand Function   Gross Motor Coordination Functional   Fine Motor Coordination Functional   Cognition   Overall Cognitive Status Impaired   Arousal/Participation Alert; Cooperative   Attention Attends with cues to redirect   Orientation Level Oriented X4   Memory Decreased short term memory;Decreased recall of recent events   Following Commands Follows one step commands with increased time or repetition   Comments pleasant and cooperative, impulsive at times   Assessment   Limitation Decreased ADL status; Decreased endurance;Decreased self-care trans;Decreased high-level ADLs   Prognosis Good   Assessment Pt is a 58 y o  male seen for OT evaluation s/p admission to [address] [place] [address] on 5/1/2022 due to  Chest pain  Pt with 4 recent admissions in the last 2 months  Pt has a significant PMH impacting occupational performance including: DM II, HLD, ESRD, cognitive impairment, hx CVA  Pt with active OT evaluation and treatment orders and activity orders for Up and OOB as tolerated   PTA pt living with family in [facility], pt (I) with ADLs and (A) with IADLs, (-)falls, (-)drives, no use of AD at baseline  Pt is motivated to return to home today   Personal and environmental factors supporting pt at time of IE include age, social support and attitude towards recovery  Personal and environmental factors inhibiting engagement in occupations include difficulty completing IADLs  During evaluation pt performed as is outlined above in flowsheet  Pt required VC for safety and VC for attention to task  Standardized assessments used to assist in identifying performance deficits include AMPAC 6-Clicks and Barthel ADL Index  Performance deficits that affect the pts occupational performance during the initial evaluation include impaired balance, functional mobility, endurance, activity tolerance, functional standing tolerance and overall strength, direction following, safety awareness and insight into deficits  No further acute OT needs identified at this time  Recommend continued active ADL participation and mobilization with hospital staff while in the hospital to increase pts endurance and strength upon D/C  From OT standpoint, recommend D/C to home with family support when medically cleared  D/C pt from OT caseload at this time  Goals   Patient Goals to go home today   Plan   OT Frequency Eval only   Recommendation   OT Discharge Recommendation No rehabilitation needs   AM-PAC Daily Activity Inpatient   Lower Body Dressing 3   Bathing 3   Toileting 3   Upper Body Dressing 4   Grooming 4   Eating 4   Daily Activity Raw Score 21   Daily Activity Standardized Score (Calc for Raw Score >=11) 44 27   AM-[facility] Applied Cognition Inpatient   Following a Speech/Presentation 3   Understanding Ordinary Conversation 4   Taking Medications 3   Remembering Where Things Are Placed or Put Away 2   Remembering List of 4-5 Errands 2   Taking Care of Complicated Tasks 2   Applied Cognition Raw Score 16   Applied Cognition Standardized Score 35 03          Pt with no acute OT needs, will d/c from OT services at this time       The patient's raw score on the AM-PAC Daily Activity inpatient short form is 21, standardized score is 44 27, greater than 39 4  Patients at this level are likely to benefit from discharge to home  Please refer to the recommendation of the Occupational Therapist for safe discharge planning      At the end of the session, all needs met and pt seated in bedside chair and call bell within reach    [facility], OTR/L

## 2022-05-03 NOTE — TELEPHONE ENCOUNTER
Reviewed patient chart - was at [facility] from 4/29-4/30 and then readmitted 5/1-current  Will continue to follow up

## 2022-05-03 NOTE — PLAN OF CARE
Problem: Potential for Falls  Goal: Patient will remain free of falls  Description: INTERVENTIONS:  - Educate patient/family on patient safety including physical limitations  - Instruct patient to call for assistance with activity   - Consult OT/PT to assist with strengthening/mobility   - Keep Call bell within reach  - Keep bed low and locked with side rails adjusted as appropriate  - Keep care items and personal belongings within reach  - Initiate and maintain comfort rounds  - Make Fall Risk Sign visible to staff  - Offer Toileting every  Hours, in advance of need  - Initiate/Maintain alarm  - Obtain necessary fall risk management equipment:   - Apply yellow socks and bracelet for high fall risk patients  - Consider moving patient to room near nurses station  Outcome: Completed     Problem: Prexisting or High Potential for Compromised Skin Integrity  Goal: Skin integrity is maintained or improved  Description: INTERVENTIONS:  - Identify patients at risk for skin breakdown  - Assess and monitor skin integrity  - Assess and monitor nutrition and hydration status  - Monitor labs   - Assess for incontinence   - Turn and reposition patient  - Assist with mobility/ambulation  - Relieve pressure over bony prominences  - Avoid friction and shearing  - Provide appropriate hygiene as needed including keeping skin clean and dry  - Evaluate need for skin moisturizer/barrier cream  - Collaborate with interdisciplinary team   - Patient/family teaching  - Consider wound care consult   Outcome: Completed     Problem: METABOLIC, FLUID AND ELECTROLYTES - ADULT  Goal: Electrolytes maintained within normal limits  Description: INTERVENTIONS:  - Monitor labs and assess patient for signs and symptoms of electrolyte imbalances  - Administer electrolyte replacement as ordered  - Monitor response to electrolyte replacements, including repeat lab results as appropriate  - Instruct patient on fluid and nutrition as appropriate  Outcome: Completed  Goal: Fluid balance maintained  Description: INTERVENTIONS:  - Monitor labs   - Monitor I/O and WT  - Instruct patient on fluid and nutrition as appropriate  - Assess for signs & symptoms of volume excess or deficit  Outcome: Completed     Problem: HEMATOLOGIC - ADULT  Goal: Maintains hematologic stability  Description: INTERVENTIONS  - Assess for signs and symptoms of bleeding or hemorrhage  - Monitor labs  - Administer supportive blood products/factors as ordered and appropriate  Outcome: Completed

## 2022-05-03 NOTE — PLAN OF CARE
Problem: Potential for Falls  Goal: Patient will remain free of falls  Description: INTERVENTIONS:  - Educate patient/family on patient safety including physical limitations  - Instruct patient to call for assistance with activity   - Consult OT/PT to assist with strengthening/mobility   - Keep Call bell within reach  - Keep bed low and locked with side rails adjusted as appropriate  - Keep care items and personal belongings within reach  - Initiate and maintain comfort rounds  - Make Fall Risk Sign visible to staff  - Offer Toileting every  Hours, in advance of need  - Initiate/Maintain alarm  - Obtain necessary fall risk management equipment: apply yellow socks and bracelet for high fall risk patients  - Consider moving patient to room near nurses station  Outcome: Progressing     Problem: Prexisting or High Potential for Compromised Skin Integrity  Goal: Skin integrity is maintained or improved  Description: INTERVENTIONS:  - Identify patients at risk for skin breakdown  - Assess and monitor skin integrity  - Assess and monitor nutrition and hydration status  - Monitor labs   - Assess for incontinence   - Turn and reposition patient  - Assist with mobility/ambulation  - Relieve pressure over bony prominences  - Avoid friction and shearing  - Provide appropriate hygiene as needed including keeping skin clean and dry  - Evaluate need for skin moisturizer/barrier cream  - Collaborate with interdisciplinary team   - Patient/family teaching  - Consider wound care consult   Outcome: Progressing     Problem: METABOLIC, FLUID AND ELECTROLYTES - ADULT  Goal: Electrolytes maintained within normal limits  Description: INTERVENTIONS:  - Monitor labs and assess patient for signs and symptoms of electrolyte imbalances  - Administer electrolyte replacement as ordered  - Monitor response to electrolyte replacements, including repeat lab results as appropriate  - Instruct patient on fluid and nutrition as appropriate  Outcome: Progressing  Goal: Fluid balance maintained  Description: INTERVENTIONS:  - Monitor labs   - Monitor I/O and WT  - Instruct patient on fluid and nutrition as appropriate  - Assess for signs & symptoms of volume excess or deficit  Outcome: Progressing     Problem: HEMATOLOGIC - ADULT  Goal: Maintains hematologic stability  Description: INTERVENTIONS  - Assess for signs and symptoms of bleeding or hemorrhage  - Monitor labs  - Administer supportive blood products/factors as ordered and appropriate  Outcome: Progressing

## 2022-05-03 NOTE — ASSESSMENT & PLAN NOTE
Lab Results   Component Value Date    EGFR 5 05/03/2022    EGFR 5 05/02/2022    EGFR 6 05/01/2022    CREATININE 8 60 (H) 05/03/2022    CREATININE 9 19 (H) 05/02/2022    CREATININE 8 37 (H) 05/01/2022     Got HD in hospital on 5/2, 5/3  HD on MWF      Plan:  · F/u with Nephrology outpatient

## 2022-05-03 NOTE — PLAN OF CARE
Will attemp for UF-2000 ml as it was discussed with [facility] PA  On 2 k bath for serum k-5 0  Only 2 hours treatment today  No complaints  Problem: METABOLIC, FLUID AND ELECTROLYTES - ADULT  Goal: Electrolytes maintained within normal limits  Description: INTERVENTIONS:  - Monitor labs and assess patient for signs and symptoms of electrolyte imbalances  - Administer electrolyte replacement as ordered  - Monitor response to electrolyte replacements, including repeat lab results as appropriate  - Instruct patient on fluid and nutrition as appropriate  Outcome: Progressing  Goal: Fluid balance maintained  Description: INTERVENTIONS:  - Monitor labs   - Monitor I/O and WT  - Instruct patient on fluid and nutrition as appropriate  - Assess for signs & symptoms of volume excess or deficit  Outcome: Progressing   Post-Dialysis RN Treatment Note    Blood Pressure:  Pre 140/86 mm/Hg  Post 138/50 mmHg   EDW  105 kg    Weight:  Pre 104 kg   Post 101 9 kg   Mode of weight measurement: Bed Scale   Volume Removed  1500 ml    Treatment duration 120 minutes    NS given  No    Treatment shortened? No   Medications given during Rx None Reported   Estimated Kt/V  Not Applicable   Access type: AV fistula   Access Issues: No    Report called to primary nurse   Yes          Mira Henry RN  Pt  develop a leg cramps during hemodialysis, lowering UF goal relieved leg cramps  Also felt dizzy when SBP was 104 reduced UF goal  Dr Jn Hoskins was made aware

## 2022-05-03 NOTE — ASSESSMENT & PLAN NOTE
Patient presented with chest pain that was 8/10, constant, nonradiating sternal dull pressure  S/S improved with ASA and nitroglycerin  ECG showed NSR with HR of 84 and   Troponin level 78  Cardiac cath 5/2/22: PCI to OM1 with ADE  Nonobstructive of LAD and mid circumflex, severe stenosis of the small distal RCA beyond the origin of the PDA were also noted  Plan:  · Continue DAPT (ASA, Plavix), 12 5 mg BID Coreg and Lipitor upon discharge  · Follow up with Cards outpatient (Dr Varun King)

## 2022-05-03 NOTE — ASSESSMENT & PLAN NOTE
Recent Labs     05/01/22  0334 05/01/22  0609 05/03/22  0618   HGB 10 5* 10 2* 10 6*     Etiology:  CKD  Not on EPO at this time  At baseline      Plan:  · F/u with PCP outpatient, Nephrology outpatient

## 2022-05-03 NOTE — CASE MANAGEMENT
Case Management Discharge Planning Note    Patient name Venice Nieves  Location S /S -01 MRN [id number]  : 1960 Date 5/3/2022       Current Admission Date: 2022  Current Admission Diagnosis:Chest pain   Patient Active Problem List    Diagnosis Date Noted    Electrolyte abnormality 2022    Chest pain 2022    Right sided pain and paresthesias 2022    Elevated troponin 2022    Hypertension 2022    Penetrating foot wound, left, initial encounter 2022    Emotional lability 2022    Enteritis 2022    Leukocytosis 2022    Pancytopenia ([facility] ) 2020    History of 2019 novel coronavirus disease (COVID-19) 2020    [facility] 2020    Acute kidney injury superimposed on chronic kidney disease ([facility] ) 10/05/2020    Anemia 10/05/2020    S/P arteriovenous (AV) fistula creation 2020    Pre-kidney transplant, listed 2020    Urge incontinence 2019    Overactive bladder 2019    Anxiety associated with depression 2019    symptomatic hypoglycemia 10/28/2018    History of stroke 10/04/2018    Abnormal EEG 2018    Other constipation 2018    GERD (gastroesophageal reflux disease) 2018    Diabetic macular edema ([facility] ) 2018    Cognitive impairment 2018    Elevated alkaline phosphatase level 2018    Nephrotic range proteinuria 2018    Type 2 diabetes mellitus with chronic kidney disease on chronic dialysis, with long-term current use of insulin ([facility] ) 2016    Dizziness 2016    Mixed hyperlipidemia 2016    Diabetic polyneuropathy associated with type 2 diabetes mellitus ([facility] ) 2016      LOS (days): 2  Geometric Mean LOS (GMLOS) (days): 3 90  Days to GMLOS:1 4     OBJECTIVE:  Risk of Unplanned Readmission Score: 39         Current admission status: Inpatient   Preferred Pharmacy:   [facility]/pharmacy #[id number]- [place] - [address]  [address] [place] [address]  Phone: [phone] Fax: [phone]    Primary Care Provider: Tahmina Goins DO    Primary Insurance: MEDICARE  Secondary Insurance:     DISCHARGE DETAILS:                                [address]         [facility] the [facility] Yes  [address] requested[de-identified] Jm Anniedhaval Name[de-identified] [phone] Provider[de-identified] PCP  Home Health Services Needed[de-identified] Evaluate Functional Status and Safety,Gait/ADL Training,Strengthening/Theraputic Exercises to Improve Function  Homebound Criteria Met[de-identified] Requires the Assistance of Another Person for Safe Ambulation or to Leave the Home  Supporting Clincal Findings[de-identified] Fatigues Easliy in Short Distances,Limited Endurance,Cognitive Deficit Requiring the Assistance of Others         Other Referral/Resources/Interventions Provided:  Interventions: [facility]  Referral Comments: Patient current with Katie; referral sent for resumption of care at d/c - information added to AVS

## 2022-05-04 DIAGNOSIS — Z71.89 COMPLEX CARE COORDINATION: Primary | ICD-10-CM

## 2022-05-05 ENCOUNTER — PATIENT OUTREACH (OUTPATIENT)
Dept: INTERNAL MEDICINE CLINIC | Facility: CLINIC | Age: 62
End: 2022-05-05

## 2022-05-05 ENCOUNTER — OFFICE VISIT (OUTPATIENT)
Dept: CARDIOLOGY CLINIC | Facility: CLINIC | Age: 62
End: 2022-05-05
Payer: MEDICARE

## 2022-05-05 VITALS
DIASTOLIC BLOOD PRESSURE: 80 MMHG | TEMPERATURE: 98.6 F | BODY MASS INDEX: 34.96 KG/M2 | HEIGHT: 69 IN | OXYGEN SATURATION: 97 % | SYSTOLIC BLOOD PRESSURE: 140 MMHG | HEART RATE: 81 BPM | WEIGHT: 236 LBS

## 2022-05-05 DIAGNOSIS — I25.10 CORONARY ARTERY DISEASE INVOLVING NATIVE CORONARY ARTERY OF NATIVE HEART WITHOUT ANGINA PECTORIS: ICD-10-CM

## 2022-05-05 DIAGNOSIS — I63.312 CEREBROVASCULAR ACCIDENT (CVA) DUE TO THROMBOSIS OF LEFT MIDDLE CEREBRAL ARTERY (HCC): ICD-10-CM

## 2022-05-05 DIAGNOSIS — I12.0 BENIGN HYPERTENSION WITH CHRONIC KIDNEY DISEASE, STAGE V (HCC): ICD-10-CM

## 2022-05-05 DIAGNOSIS — I10 ESSENTIAL HYPERTENSION: Chronic | ICD-10-CM

## 2022-05-05 DIAGNOSIS — R77.8 ELEVATED TROPONIN: ICD-10-CM

## 2022-05-05 DIAGNOSIS — N18.6 ESRD ON DIALYSIS (HCC): ICD-10-CM

## 2022-05-05 DIAGNOSIS — E78.5 DYSLIPIDEMIA: ICD-10-CM

## 2022-05-05 DIAGNOSIS — Z99.2 ESRD ON DIALYSIS (HCC): ICD-10-CM

## 2022-05-05 DIAGNOSIS — N18.5 BENIGN HYPERTENSION WITH CHRONIC KIDNEY DISEASE, STAGE V (HCC): ICD-10-CM

## 2022-05-05 PROCEDURE — 99214 OFFICE O/P EST MOD 30 MIN: CPT | Performed by: INTERNAL MEDICINE

## 2022-05-05 PROCEDURE — 93000 ELECTROCARDIOGRAM COMPLETE: CPT | Performed by: INTERNAL MEDICINE

## 2022-05-05 RX ORDER — SEVELAMER CARBONATE FOR ORAL SUSPENSION 2400 MG/1
1 POWDER, FOR SUSPENSION ORAL 3 TIMES DAILY
COMMUNITY
Start: 2022-05-04 | End: 2022-06-15

## 2022-05-05 NOTE — PROGRESS NOTES
Consultation - Cardiology Office  [facility]  Mati Arroyo 58 y o  male MRN: [id number]  : 1960  Unit/Bed#:  Encounter: [id number]      Assessment:     1  Elevated troponin    2  ESRD on dialysis ([facility] )    3  Coronary artery disease involving native coronary artery of native heart without angina pectoris    4  Dyslipidemia    5  Essential hypertension    6  Benign hypertension with chronic kidney disease, stage V ([facility] )    7  Cerebrovascular accident (CVA) due to thrombosis of left middle cerebral artery ([facility] )        Discussion summary and Plan:    1  History of end-stage renal disease on dialysis  Patient get dialysis on   He is looking to have transplant  He is getting his dialysis by AV fistula on the left arm  He is doing well  2  Recent admission for chest pain with elevated troponin  Underwent cardiac catheterization found to have significant stenosis of a medium size obtuse marginal which was successfully stented has small vessel disease and will be managed with medical therapy    3  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  Patient on medical Rx he has recovered lot of ambulatory function  Some memory issues no other problems  Continue dual platelet      4  Dyslipidemia  Continue high intensity statins  He is on Lipitor 40 mg    5  Essential hypertension  Currently he is taking Coreg 12 5 twice a day  Along with diuretics  They will monitor blood pressure closely a persistently above 150 and heart rate above 60 he can give an additional dose  6    Obesity with BMI around 35  Advised to lose weight  7  Coronary artery disease status post stenting of obtuse marginal continue dual platelet therapy he is on aspirin and Plavix  Continue medical Rx    8  Elevated troponin with admission is with high blood pressure  Status post stenting as mentioned above      9  RBBB which is chronic      Continue current Rx strongly advised him to lose weight  And continue current Rx  For now we continue aggressive medical Rx  He need to control his blood pressure and cholesterol changes dietary habits  If he has any change in symptoms he will call us and we will consider it  Thank you for your consultation  If you have any question please call me at [phone]    Counseling :  A description of the counseling  Goals and Barriers  Patient's ability to self care: Yes  Medication side effect reviewed with patient in detail and all their questions answered to their satisfaction  Primary Care Physician : Cheir Stevenson, DO      HPI :     Marybeth Alla is a 58y o  year old male who was was initially seen by us many years ago was recently [facility] with near syncopal episode  Patient has past medical significant for chronic kidney disease, diabetes mellitus, diarrhea, history of stroke who presents with unresponsive episode  Patient was going to flea market in his car with his family, he was not driving  Family noted him to be very sweaty and pale, and became unresponsive  Patient was brought to the emergency department and noted to have blood glucose of 40  Patient is not doing well  He denies any new complaints  His echo from [address] [place] [address] reviewed  During workup he was found to have CKD stage 3/4  He now follows up with [facility]  He is doing well  His kidney function has stabilized  He does occasionally get short of breath when he walks  After his stroke is not that active  He is having lot of problem with his urine incontinent  He is scheduled to have a procedure done by urology  He may need clearance for that procedure  minute     09/21/2021  Above reviewed  Patient came for follow-up he is doing well from cardiac point of view  He had now AV fistula in the left arm and he get dialysis through that  He has no problem getting dialysis    He is on dialysis on Monday Wednesday Friday  He has medical history significant for hypertension, diabetes mellitus, history of stroke and abnormal EKG  He has a nonischemic nuclear in May of 2021  He is trying hard to lose weight so that he can be on the list   He has no nausea no vomiting no fever no chills  He has some itchiness and has some open wound he is using cream   No other cardiovascular issues today vitals has been stable  Labs reviewed  01/20/2022  Above reviewed  Patient was just discharged from the hospital   He was admitted after he felt dizzy and lightheadedness yesterday after had complete dialysis  He has medical history significant for end-stage renal disease on dialysis, hypertension, history of stroke, type 2 diabetes mellitus on insulin and history of nonischemic nuclear stress May 2021  His trying hard to lose his weight so that he can be on the our transplant list   EKG yesterday shows new T-wave inversion in lead 2 3 AVF and V5 to V6 and troponin was slightly elevated however delta was 0  He was dizzy and lightheadedness  Today he denies any chest pain any shortness of breath he feels back to baseline  Discharge team as already ordered nuclear stress test on him  His initial EKG at presentation shows he had T-wave inversion inferior leads which has normalized later on  His initial troponin was 62 delta was 0  He was noted to be orthostatic while he was in the ED  His standing blood pressure was 117/59 and his sitting blood pressure was 130/65  He says and his wife was present there that he was given IV fluids and he felt better  He feels absolutely back to baseline no symptoms  05/05/2022    Above reviewed patient was recently admitted to [facility] with episodes of chest pain very high blood pressure and had a positive troponin  Underwent cardiac catheterization found to have a small obtuse marginal around 90% stenosis which was successfully stented    He does have a right coronary artery RPL small vessel disease for which he is being managed with medical therapy  He had a medical history significant for end-stage renal disease, on dialysis, hypertension, history of stroke, history of type 2 diabetes mellitus, history of abnormal nuclear stress test   She is trying hard to lose weight but he is not very successful but he is working on it his EKG shows he has sinus rhythm right bundle-branch block and T-wave inversions  His blood pressure has been acceptable today he came with his wife and his son was over the phone  No nausea no vomiting no fever no chills he feels lot better since he went home and his radial site is healing well he has no headaches and no chest pain  He gets very easily anxious  Review of Systems   Constitutional: Negative for activity change, chills, diaphoresis, fever and unexpected weight change  HENT: Negative for congestion  Eyes: Negative for discharge and redness  Respiratory: Negative for cough, chest tightness, shortness of breath and wheezing  Cardiovascular: Negative  Negative for chest pain, palpitations and leg swelling  Recently had angioplasty of obtuse marginal 1 has some residual distal RPL disease   Gastrointestinal: Negative for abdominal pain, diarrhea and nausea  Endocrine: Negative  Genitourinary: Negative for decreased urine volume and urgency  End-stage renal disease on dialysis   Musculoskeletal: Negative  Negative for arthralgias, back pain and gait problem  Skin: Negative for rash and wound  Allergic/Immunologic: Negative  Neurological: Negative for dizziness, seizures, syncope, weakness, light-headedness and headaches  Hematological: Negative  Psychiatric/Behavioral: Negative for agitation and confusion  The patient is nervous/anxious          Historical Information   Past Medical History:   Diagnosis Date    Cerebrovascular accident (CVA) due to thrombosis of left middle cerebral artery ([facility] ) 7/29/2018    Chronic kidney disease     Diabetes mellitus ([facility] )     GERD (gastroesophageal reflux disease)     Hypercholesteremia     Hyperlipidemia     Hypertension     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    Neuropathy     Obesity     Osteomyelitis ([facility] )     last assessed 11/4/16    PVC's (premature ventricular contractions)     sees cardiology Dr Jada camargo    Stroke [facility])     last weeof July 2018 [address] [facility]    TIA (transient ischemic attack) 10/28/2018     Past Surgical History:   Procedure Laterality Date    ABDOMINAL SURGERY      CARDIAC CATHETERIZATION N/A 5/2/2022    Procedure: Cardiac Coronary Angiogram;  Surgeon: Carlos Torres MD;  Location: [facility] CARDIAC CATH LAB; Service: Cardiology    CARDIAC CATHETERIZATION N/A 5/2/2022    Procedure: Cardiac pci;  Surgeon: Carlos Torres MD;  Location: [facility] CARDIAC CATH LAB; Service: Cardiology    CHOLECYSTECTOMY      Percutaneous    COLONOSCOPY      CYSTOSCOPY      OTHER SURGICAL HISTORY      "stimulator to control bowel movements"    AR ESOPHAGOGASTRODUODENOSCOPY TRANSORAL DIAGNOSTIC N/A 9/27/2016    Procedure: ESOPHAGOGASTRODUODENOSCOPY (EGD); Surgeon: Amish Garcia MD;  Location: AN GI LAB;   Service: Gastroenterology    AR LAP,CHOLECYSTECTOMY N/A 2/29/2016    Procedure: LAPAROSCOPIC CHOLECYSTECTOMY ;  Surgeon: Kylie Laguna DO;  Location: [facility] Main OR;  Service: General    ROTATOR CUFF REPAIR Right     TOE AMPUTATION Right 10/28/2016    Procedure: 3RD TOE AMPUTATION ;  Surgeon: Timmy Nageotte, DPM;  Location: [facility] Main OR;  Service:      Social History     Substance and Sexual Activity   Alcohol Use Not Currently     Social History     Substance and Sexual Activity   Drug Use No     Social History     Tobacco Use   Smoking Status Never Smoker   Smokeless Tobacco Never Used     Family History:   Family History   Problem Relation Age of Onset    Leukemia Mother     Liver disease Mother     Lung cancer Mother heavy smoker - 3 ppd    Heart disease Father     Liver disease Father     Multiple myeloma Sister     Breast cancer Sister     Urolithiasis Family     Alcohol abuse Neg Hx     Depression Neg Hx     Drug abuse Neg Hx     Substance Abuse Neg Hx     Mental illness Neg Hx        Meds/Allergies     No Known Allergies    Current Outpatient Medications:     aluminum-magnesium hydroxide-simethicone (MYLANTA) 200-200-20 mg/5 mL suspension, Take 30 mL by mouth every 4 (four) hours as needed for indigestion or heartburn, Disp: 355 mL, Rfl: 0    aspirin (ECOTRIN LOW STRENGTH) 81 mg EC tablet, Take 81 mg by mouth daily Resume on 8/14  , Disp: , Rfl:     atorvastatin (LIPITOR) 40 mg tablet, Take 1 tablet (40 mg total) by mouth daily with dinner, Disp: 90 tablet, Rfl: 1    BD Pen Needle Adina U/F 32G X 4 MM MISC, USE TO INJECT INSULIN 4 TIMES DAILY, Disp: 400 each, Rfl: 1    Blood Glucose Monitoring Suppl (True Metrix Meter) w/Device KIT, Use to test blood sugars 3 times daily, Disp: 1 kit, Rfl: 0    Blood Pressure Monitoring (BLOOD PRESSURE CUFF) MISC, Use to check blood pressure before taking blood pressure medication and 1 hour after and follow instructions provided in discharge instructions based on the readings  , Disp: 1 each, Rfl: 0    calcium acetate (CALPHRON) 667 mg, TAKE 2 TABLETS BY MOUTH THREE TIMES DAILY WITH MEALS, Disp: , Rfl:     calcium carbonate (TUMS) 500 mg chewable tablet, Chew 1 tablet (500 mg total) daily as needed for indigestion or heartburn, Disp: , Rfl: 0    carvedilol (COREG) 12 5 mg tablet, Take 1 tablet (12 5 mg total) by mouth 2 (two) times a day with meals, Disp: 60 tablet, Rfl: 0    Cholecalciferol (Vitamin D3) 1 25 MG (09349 UT) CAPS, TAKE 1 CAPSULE BY MOUTH ONE TIME PER WEEK, Disp: 12 capsule, Rfl: 2    clopidogrel (PLAVIX) 75 mg tablet, Take 1 tablet (75 mg total) by mouth daily, Disp: 30 tablet, Rfl: 0    Continuous Blood Gluc  (DEXCOM G6 ) LANCE, Use as directed for continuous glucose monitoring, Disp: 1 Device, Rfl: 0    Continuous Blood Gluc Sensor (DEXCOM G6 SENSOR) MISC, Use as directed for continuous glucose monitoring  Change every 10 days, Disp: 1 each, Rfl: 11    Continuous Blood Gluc Transmit (DEXCOM G6 TRANSMITTER) MISC, Use as directed for continuous glucose monitoring-Change every 3 months, Disp: 1 each, Rfl: 3    glucose blood (True Metrix Blood Glucose Test) test strip, Use 1 each 3 (three) times a day Use as instructed, Disp: 100 each, Rfl: 0    insulin lispro (HumaLOG KwikPen) 100 units/mL injection pen, INJECT 4 UNITS 3 TIMES A DAY WITH MEALS PLUS SCALE (max daily dose 42 units), Disp: 45 mL, Rfl: 1    Insulin Syringe-Needle U-100 (B-D INS SYRINGE 0 5CC/30GX1/2") 30G X 1/2" 0 5 ML MISC, Inject under the skin 4 (four) times a day, Disp: 360 each, Rfl: 1    Lancets Ultra Thin 30G MISC, Use 3 times a day, Disp: 300 each, Rfl: 0    Lantus 100 UNIT/ML subcutaneous injection, INJECT 14 UNITS UNDER THE SKIN DAILY, Disp: 10 mL, Rfl: 0    meclizine (ANTIVERT) 25 mg tablet, Take 1 tablet (25 mg total) by mouth every 8 (eight) hours as needed for dizziness or nausea, Disp: 30 tablet, Rfl: 0    Nutritional Supplements (VITAMIN D BOOSTER PO), Take 0 5 mcg by mouth  , Disp: , Rfl:     ONETOUCH DELICA LANCETS 12I MISC, by Does not apply route 3 (three) times a day, Disp: 270 each, Rfl: 1    Sevelamer Carbonate 2 4 g PACK, Take 1 packet by mouth Three times a day, Disp: , Rfl:     TORSEMIDE PO, Take 50 mg by mouth Pt takes 50 mg daily on non- dialysis days: sat, sun, Tues, and thrusday   Pt takes 10 mg of torsemide daily on dialysis days m- w- f  , Disp: , Rfl:     doxercalciferol (HECTOROL) 0 5 mcg capsule, 4 mcg (Patient not taking: Reported on 5/5/2022 ), Disp: , Rfl:     Incontinence Supplies (MALE URINAL) MISC, by Does not apply route daily (Patient not taking: Reported on 5/5/2022 ), Disp: 6 each, Rfl: 3    Vitals: Blood pressure 140/80, pulse 81, temperature 98 6 °F (37 °C), height 5' 9" (1 753 m), weight 107 kg (236 lb), SpO2 97 %  Body mass index is 34 85 kg/m²  Vitals:    05/05/22 1601   Weight: 107 kg (236 lb)     BP Readings from Last 3 Encounters:   05/05/22 140/80   05/03/22 138/50   04/30/22 143/74         Physical Exam  Constitutional:       General: He is not in acute distress  Appearance: He is well-developed  He is not diaphoretic  Neck:      Thyroid: No thyromegaly  Vascular: No JVD  Trachea: No tracheal deviation  Cardiovascular:      Rate and Rhythm: Normal rate and regular rhythm  Heart sounds: S1 normal and S2 normal  Heart sounds not distant  Murmur heard  Systolic (ejection) murmur is present with a grade of 2/6  No friction rub  No gallop  No S3 or S4 sounds  Pulmonary:      Effort: Pulmonary effort is normal  No respiratory distress  Breath sounds: Normal breath sounds  No wheezing or rales  Chest:      Chest wall: No tenderness  Abdominal:      General: Bowel sounds are normal  There is no distension  Palpations: Abdomen is soft  Tenderness: There is no abdominal tenderness  Musculoskeletal:         General: No deformity  Cervical back: Neck supple  Skin:     General: Skin is warm and dry  Coloration: Skin is not pale  Findings: No rash  Neurological:      Mental Status: He is alert and oriented to person, place, and time  Psychiatric:         Behavior: Behavior normal          Judgment: Judgment normal            Diagnostic Studies Review Cardio:  Nuclear stress test   Nuclear stress test 10/14/2019 were nonischemic EF around 50%  Echo Doppler  Echo Doppler done on 10/06/2020 shows EF 91%, grade 2 diastolic dysfunction, moderate LVH, moderate mitral annulus calcification with mild MR mild-to-moderate aortic stenosis and mild aortic regurgitation       EKG:  EKG today shows normal sinus rhythm nonspecific ST changes heart rate 80 beats per minute  Twelve lead EKG 01/15/2020 shows normal sinus rhythm LVH by voltage  ST abnormality in inferior leads certainly cannot rule ischemia but had a nonischemic nuclear  Twelve lead EKG 03/18/2021 shows normal sinus rhythm heart rate 76 beats per minute nonspecific ST changes in inferior lead no change from old EKG  Twelve lead EKG 04/09/2021 shows normal sinus rhythm bifascicular block heart rate 87 beats per minute  As compared to previous EKG patient has not developed bundle branch block  Twelve lead EKG 05/05/2022 shows normal sinus rhythm right bundle branch block heart rate 81 beats per minute no change from previous EKG              Lab Review   Lab Results   Component Value Date    WBC 6 52 05/03/2022    HGB 10 6 (L) 05/03/2022    HCT 32 3 (L) 05/03/2022    MCV 96 05/03/2022    RDW 14 4 05/03/2022     (L) 05/03/2022     BMP:  Lab Results   Component Value Date    SODIUM 135 (L) 05/03/2022    K 5 0 05/03/2022     05/03/2022    CO2 20 (L) 05/03/2022    ANIONGAP 9 01/03/2016    BUN 70 (H) 05/03/2022    CREATININE 8 60 (H) 05/03/2022    GLUC 119 05/03/2022    GLUF 178 (H) 06/18/2021    CALCIUM 8 2 (L) 05/03/2022    CORRECTEDCA 9 1 05/01/2022    EGFR 5 05/03/2022    MG 2 2 05/01/2022     LFT:  Lab Results   Component Value Date    AST 8 05/01/2022    ALT 19 05/01/2022    ALKPHOS 113 05/01/2022    TP 6 7 05/01/2022    ALB 3 4 (L) 05/01/2022      Lab Results   Component Value Date    MYM1PGWAVJTD 1 276 04/06/2022     Lab Results   Component Value Date    HGBA1C 6 0 (H) 04/30/2022     Lipid Profile:   Lab Results   Component Value Date    CHOLESTEROL 84 04/30/2022    HDL 34 (L) 04/30/2022    LDLCALC 25 04/30/2022    TRIG 125 04/30/2022     Lab Results   Component Value Date    CHOLESTEROL 84 04/30/2022    CHOLESTEROL 76 04/19/2022     Lab Results   Component Value Date    CKTOTAL 385 (H) 12/30/2020    CKMB 2 0 12/30/2020    CKMBINDEX <1 0 12/30/2020    TROPONINI <0 02 09/08/2021 Lab Results   Component Value Date    NTBNP 1,859 (H) 01/02/2022            Dr Humberto Antoine MD [facility]      "This note has been constructed using a voice recognition system  Therefore there may be syntax, spelling, and/or grammatical errors   Please call if you have any questions  "

## 2022-05-05 NOTE — TELEPHONE ENCOUNTER
I spoke with the inpatient team, ultimately this was not a neurologic event I believe  I think he did have a repeat MRI, but I dont remember there being any significant loose ends  If the inpatient note suggests that he should see a PA that would be fine as far as Im concerned

## 2022-05-05 NOTE — TELEPHONE ENCOUNTER
Called and spoke with wife  She is agreeable to the plan  Schedule restrictions - has dialysis MWF, so can only be seen Tuesdays and Thursdays  Spoke with Sanju Hawley - we were able to find a virtual visit 5/26 with Dr Juan M Alvarez at 6 am  Loyda Myers with wife and she is agreeable to this appt   States they do not use mychart - must use [employer] [phone]

## 2022-05-05 NOTE — TELEPHONE ENCOUNTER
Reviewed patient chart, he was indeed discharged 5/3  Called and spoke with wife  Since discharge, she denies experiencing any new or worsening stroke-like symptoms  Patient is indeed feeling much better  She then put son James Carlisle on the phone  Son claims that while he was at the hospital, the inpatient neuro team advised he see Dr Marco A Westfall  Informed son that I do not have a schedule with Dr Marco A Westfall  Also reviewed the original plan but claims inpatient team during readmission advised he follow up with Dr Marco A Westfall within 7 days  Informed him I will message Dr Marco A Westfall and get back to them  Best call back [phone]    TT sent to Dr Marco A Westfall

## 2022-05-11 NOTE — PHYSICIAN ADVISOR
Current patient class: Inpatient  The patient is currently on Hospital Day: 2 at [facility]      The patient was admitted to the hospital  on 4/29/22 at 12:35 PM for the following diagnosis:  Hypertensive crisis [I16 9]  Dizziness [R42]  Stroke ([address] ) [I63 9]  Elevated troponin [R77 8]  Headache [R51 9]     After review of the relevant documentation, labs, vital signs and test results, this is a PROVIDER LIABLE case  In this particular case the patient was admitted to the hospital as an inpatient  The patient however failed to satisfy the 2 midnight benchmark and closer scrutiny of the case was warranted  After review of the patient presentation and relevant labs the patient was most appropriate for observation or outpatient class at the time of admission  Given that this patient has already been discharged prior to this review they become a provider liable case  Rationale is as follows: The patient is a 58 yrs   Male who presented to the ED at 4/29/2022 10:09 AM with a chief complaint of Headache (pt states about 2 hours ago pt started with a headache and chest pain, chest pain radiates down both arms and pt states numbness/tingling down both arms as well  pt has no neuro deficits in triage, hx of stroke) and Chest Pain  The patient was noted to be in hypertensive crisis  That did improve during the hospitalization the patient was asymptomatic the following day  Given that the patient came in with dizziness with possible vertigo, the patient was most appropriate for observation status on admission  This case is provider liable      The patients vitals on arrival were   ED Triage Vitals   Temperature Pulse Respirations Blood Pressure SpO2   04/29/22 1017 04/29/22 1017 04/29/22 1017 04/29/22 1017 04/29/22 1017   98 °F (36 7 °C) 79 20 (!) 223/93 97 %      Temp Source Heart Rate Source Patient Position - Orthostatic VS BP Location FiO2 (%)   04/29/22 1017 04/29/22 1017 04/29/22 1017 04/29/22 1017 --   Oral Monitor Lying Right arm       Pain Score       04/29/22 1045       5           Past Medical History:   Diagnosis Date    Cerebrovascular accident (CVA) due to thrombosis of left middle cerebral artery ([facility] ) 7/29/2018    Chronic kidney disease     Diabetes mellitus ([facility] )     GERD (gastroesophageal reflux disease)     Hypercholesteremia     Hyperlipidemia     Hypertension     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    Neuropathy     Obesity     Osteomyelitis ([facility] )     last assessed 11/4/16    PVC's (premature ventricular contractions)     sees cardiology Dr Shaina camargo    Stroke [facility])     last weeof July 2018 [address] [facility]    TIA (transient ischemic attack) 10/28/2018     Past Surgical History:   Procedure Laterality Date    ABDOMINAL SURGERY      CARDIAC CATHETERIZATION N/A 5/2/2022    Procedure: Cardiac Coronary Angiogram;  Surgeon: Froy Bourgeois MD;  [facility] CARD[facility]; Service: Cardiology    CARDIAC CATHETERIZATION N/A 5/2/2022    Procedure: Cardiac pci;  Surgeon: Froy Bourgeois MD;  [facility] CARD[facility]AC [facility]ATH [facility]; Service: Cardiology    CHOLECYSTECTOMY      Percutaneous    COLONOSCOPY      CYSTOSCOPY      OTHER SURGICAL HISTORY      "stimulator to control bowel movements"    SC ESOPHAGOGASTRODUODENOSCOPY TRANSORAL DIAGNOSTIC N/A 9/27/2016    Procedure: ESOPHAGOGASTRODUODENOSCOPY (EGD); Surgeon: Elio Cramer MD;  Location: [facility] GI LAB;   Service: Gastroenterology    SC LAP,CHOLECYSTECTOMY N/A 2/29/2016    Procedure: LAPAROSCOPIC CHOLECYSTECTOMY ;  Surgeon: Fermin Willis DO;  Location: [facility] Main OR;  Service: General    ROTATOR CUFF REPAIR Right     TOE AMPUTATION Right 10/28/2016    Procedure: 3RD TOE AMPUTATION ;  Surgeon: Irene James DPM;  Location: [facility] Main [facility];  Service:            Consults have been placed to:   IP CONSULT TO NEUROLOGY  IP CONSULT TO NUTRITION SERVICES  IP CONSULT TO CARDIOLOGY    Vitals: 04/29/22 1745 04/29/22 1818 04/29/22 2312 04/30/22 0752   BP: 134/59 147/68 164/66 143/74   BP Location: Right arm Left arm Right arm Right arm   Pulse: 80 78 75 72   Resp: 18 18  20   Temp:  98 4 °F (36 9 °C) 98 3 °F (36 8 °C) 98 3 °F (36 8 °C)   TempSrc:  Oral Oral Oral   SpO2: 99% 98% 94% 96%   Weight:  107 kg (236 lb 1 8 oz)     Height:  5' 9" (1 753 m)         Most recent labs:    No results for input(s): WBC, HGB, HCT, PLT, K, NA, CALCIUM, BUN, CREATININE, LIPASE, AMYLASE, INR, TROPONINI, CKTOTAL, AST, ALT, ALKPHOS, BILITOT in the last 72 hours  Scheduled Meds:  Continuous Infusions:No current facility-administered medications for this encounter  PRN Meds:      Surgical procedures (if appropriate):

## 2022-05-12 DIAGNOSIS — E11.65 TYPE 2 DIABETES MELLITUS WITH HYPERGLYCEMIA, WITH LONG-TERM CURRENT USE OF INSULIN (HCC): ICD-10-CM

## 2022-05-12 DIAGNOSIS — Z79.4 TYPE 2 DIABETES MELLITUS WITH HYPERGLYCEMIA, WITH LONG-TERM CURRENT USE OF INSULIN (HCC): ICD-10-CM

## 2022-05-12 RX ORDER — CALCIUM CITRATE/VITAMIN D3 200MG-6.25
1 TABLET ORAL 3 TIMES DAILY
Qty: 100 STRIP | Refills: 5 | Status: SHIPPED | OUTPATIENT
Start: 2022-05-12 | End: 2022-07-01 | Stop reason: SDUPTHER

## 2022-05-17 ENCOUNTER — OFFICE VISIT (OUTPATIENT)
Dept: PODIATRY | Facility: CLINIC | Age: 62
End: 2022-05-17
Payer: MEDICARE

## 2022-05-17 ENCOUNTER — TELEPHONE (OUTPATIENT)
Dept: INTERNAL MEDICINE CLINIC | Facility: CLINIC | Age: 62
End: 2022-05-17

## 2022-05-17 VITALS
SYSTOLIC BLOOD PRESSURE: 171 MMHG | BODY MASS INDEX: 34.96 KG/M2 | HEART RATE: 75 BPM | DIASTOLIC BLOOD PRESSURE: 78 MMHG | WEIGHT: 236 LBS | HEIGHT: 69 IN

## 2022-05-17 DIAGNOSIS — F41.8 ANXIETY ASSOCIATED WITH DEPRESSION: ICD-10-CM

## 2022-05-17 DIAGNOSIS — E11.42 DIABETIC POLYNEUROPATHY ASSOCIATED WITH TYPE 2 DIABETES MELLITUS (HCC): ICD-10-CM

## 2022-05-17 DIAGNOSIS — S92.415A CLOSED NONDISPLACED FRACTURE OF PROXIMAL PHALANX OF LEFT GREAT TOE, INITIAL ENCOUNTER: Primary | ICD-10-CM

## 2022-05-17 PROCEDURE — 99213 OFFICE O/P EST LOW 20 MIN: CPT | Performed by: PODIATRIST

## 2022-05-17 RX ORDER — SERTRALINE HYDROCHLORIDE 25 MG/1
TABLET, FILM COATED ORAL
Qty: 30 TABLET | Refills: 1 | Status: SHIPPED | OUTPATIENT
Start: 2022-05-17 | End: 2022-06-16

## 2022-05-17 NOTE — TELEPHONE ENCOUNTER
I think the zoloft will help    Also, I think his kidney dr was okay with Brenda David taking zoloft    Let me know if Shayne Webb wants me to prescribe it again    thanks

## 2022-05-17 NOTE — TELEPHONE ENCOUNTER
PT'S DAUGHTER CALLED, SAID THAT MOM WANTS TO KNOW IF IT'S OK FOR DAD TO TAKE THE ZOLOFT     SAID THAT THEY'RE "WORRIED ABOUT HIM TAKING IT WITH ALL OF HIS HEART PROBLEMS"    SHE'S NOT ON HIS LIST OF PEOPLE WHO WE MAY SPEAK WITH ABOUT HIS CARE WHICH I TOLD HER AND SHE ASKED THAT WE CALL HER BROTHER BACK WITH THE INFORMATION     PLEASE ADVISE

## 2022-05-17 NOTE — TELEPHONE ENCOUNTER
Okay    Sertraline ordered at 12 5mg tablet to take at bedtime for 4 days, then increase dose to 25mg at bedtime    This is lower than the dose he was taking by half  Yumiko Gilbert I don't think it can go any lower and still help with anxiety

## 2022-05-17 NOTE — PROGRESS NOTES
PATIENT:  Anh Adorno    1960    ASSESSMENT:     1  Closed nondisplaced fracture of proximal phalanx of left great toe, initial encounter     2  Diabetic polyneuropathy associated with type 2 diabetes mellitus ([address] )         PLAN:  1  Patient was counseled and educated on the condition and the diagnosis  2  X-ray was reviewed / discussed again  3  The diagnosis, treatment options and prognosis were discussed with the patient  4  Okay to wear regular shoes with louann splint  5  Educated disease prevention and risks related to diabetes  Educated proper daily foot care and exam   Instructed proper skin care / protection and footwear  Discussed proper BS control with diet  6  RA in 6 weeks  Subjective:      HPI:   The patients presents for follow-up on left great toe fracture  His pain resolved  Decreased swelling  He presents with diabetic shoes  He is not compliant about taping his toes  The following portions of the patient's history were reviewed and updated as appropriate: allergies, current medications, past family history, past medical history, past social history, past surgical history and problem list   All pertinent labs and images were reviewed      Past Medical History  Past Medical History:   Diagnosis Date    Cerebrovascular accident (CVA) due to thrombosis of left middle cerebral artery ([address] ) 7/29/2018    Chronic kidney disease     Diabetes mellitus ([address] )     GERD (gastroesophageal reflux disease)     Hypercholesteremia     Hyperlipidemia     Hypertension     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    Neuropathy     Obesity     Osteomyelitis ([address] )     last assessed 11/4/16    PVC's (premature ventricular contractions)     sees cardiology Dr Willis camargo    St[facility])     last weeof July 2018 [address] [facility]    TIA (transient ischemic attack) 10/28/2018       Past Surgical History  Past Surgical History:   Procedure Laterality Date  ABDOMINAL SURGERY      CARDIAC CATHETERIZATION N/A 5/2/2022    Procedure: Cardiac Coronary Angiogram;  Surgeon: Lina Randle MD;  [facility]; Service: Cardiology    CARDIAC CATHETERIZATION N/A 5/2/2022    Procedure: Cardiac pci;  Surgeon: Lina Randle MD;  [facility] CARD[facility]AC [facility]ATH [facility]; Service: Cardiology    CHOLECYSTECTOMY      Percutaneous    COLONOSCOPY      CYSTOSCOPY      OTHER SURGICAL HISTORY      "stimulator to control bowel movements"    OK ESOPHAGOGASTRODUODENOSCOPY TRANSORAL DIAGNOSTIC N/A 9/27/2016    Procedure: ESOPHAGOGASTRODUODENOSCOPY (EGD); Surgeon: Daja Chris MD;  Location: [facility] GI LAB; Service: Gastroenterology    OK LAP,CHOLECYSTECTOMY N/A 2/29/2016    Procedure: LAPAROSCOPIC CHOLECYSTECTOMY ;  Surgeon: Elizabeth Bauman DO;  Location: [facility] Main OR;  Service: General    ROTATOR CUFF REPAIR Right     TOE AMPUTATION Right 10/28/2016    Procedure: 3RD TOE AMPUTATION ;  Surgeon: Joan Meyer DPM;  Location[facility];  Service:         Allergies:  Patient has no known allergies  Medications:  Current Outpatient Medications   Medication Sig Dispense Refill    aluminum-magnesium hydroxide-simethicone (MYLANTA) 200-200-20 mg/5 mL suspension Take 30 mL by mouth every 4 (four) hours as needed for indigestion or heartburn 355 mL 0    aspirin (ECOTRIN LOW STRENGTH) 81 mg EC tablet Take 81 mg by mouth daily Resume on 8/14        atorvastatin (LIPITOR) 40 mg tablet Take 1 tablet (40 mg total) by mouth daily with dinner 90 tablet 1    BD Pen Needle Adina U/F 32G X 4 MM MISC USE TO INJECT INSULIN 4 TIMES DAILY 400 each 1    Blood Glucose Monitoring Suppl (True Metrix Meter) w/Device KIT Use to test blood sugars 3 times daily 1 kit 0    Blood Pressure Monitoring (BLOOD PRESSURE CUFF) MISC Use to check blood pressure before taking blood pressure medication and 1 hour after and follow instructions provided in discharge instructions based on the readings   1 each 0    calcium acetate (CALPHRON) 667 mg TAKE 2 TABLETS BY MOUTH THREE TIMES DAILY WITH MEALS      calcium carbonate (TUMS) 500 mg chewable tablet Chew 1 tablet (500 mg total) daily as needed for indigestion or heartburn  0    carvedilol (COREG) 12 5 mg tablet Take 1 tablet (12 5 mg total) by mouth 2 (two) times a day with meals 60 tablet 0    Cholecalciferol (Vitamin D3) 1 25 MG (78767 UT) CAPS TAKE 1 CAPSULE BY MOUTH ONE TIME PER WEEK 12 capsule 2    clopidogrel (PLAVIX) 75 mg tablet Take 1 tablet (75 mg total) by mouth daily 30 tablet 0    Continuous Blood Gluc  (DEXCOM G6 ) LANCE Use as directed for continuous glucose monitoring 1 Device 0    Continuous Blood Gluc Sensor (DEXCOM G6 SENSOR) MISC Use as directed for continuous glucose monitoring  Change every 10 days 1 each 11    Continuous Blood Gluc Transmit (DEXCOM G6 TRANSMITTER) MISC Use as directed for continuous glucose monitoring-Change every 3 months 1 each 3    insulin lispro (HumaLOG KwikPen) 100 units/mL injection pen INJECT 4 UNITS 3 TIMES A DAY WITH MEALS PLUS SCALE (max daily dose 42 units) 45 mL 1    Insulin Syringe-Needle U-100 (B-D INS SYRINGE 0 5CC/30GX1/2") 30G X 1/2" 0 5 ML MISC Inject under the skin 4 (four) times a day 360 each 1    Lancets Ultra Thin 30G MISC Use 3 times a day 300 each 0    Lantus 100 UNIT/ML subcutaneous injection INJECT 14 UNITS UNDER THE SKIN DAILY 10 mL 0    meclizine (ANTIVERT) 25 mg tablet Take 1 tablet (25 mg total) by mouth every 8 (eight) hours as needed for dizziness or nausea 30 tablet 0    Nutritional Supplements (VITAMIN D BOOSTER PO) Take 0 5 mcg by mouth        ONETOUCH DELICA LANCETS 73R MISC by Does not apply route 3 (three) times a day 270 each 1    Sevelamer Carbonate 2 4 g PACK Take 1 packet by mouth Three times a day      TORSEMIDE PO Take 50 mg by mouth Pt takes 50 mg daily on non- dialysis days: sat, sun, Tues, and thrusday   Pt takes 10 mg of torsemide daily on dialysis days m- w- f        True Metrix Blood Glucose Test test strip USE 1 EACH 3 (THREE) TIMES A DAY USE AS INSTRUCTED 100 strip 5    doxercalciferol (HECTOROL) 0 5 mcg capsule 4 mcg (Patient not taking: No sig reported)      Incontinence Supplies (MALE URINAL) MISC by Does not apply route daily (Patient not taking: No sig reported) 6 each 3    sertraline (Zoloft) 25 mg tablet Take 1/2 tablet every day at bedtime for 4 days, then increase to 1 tablet every day at bedtime 30 tablet 1     No current facility-administered medications for this visit  Social History:  Social History     Socioeconomic History    Marital status: /Civil Union     Spouse name: None    Number of children: None    Years of education: None    Highest education level: Not asked   Occupational History    Occupation: disabled   Tobacco Use    Smoking status: Never Smoker    Smokeless tobacco: Never Used   Vaping Use    Vaping Use: Never used   Substance and Sexual Activity    Alcohol use: Not Currently    Drug use: No    Sexual activity: Not Currently   Other Topics Concern    None   Social History Narrative    Daily caffeine consumption 2-3 servings a day     Social Determinants of Health     Financial Resource Strain: Not on file   Food Insecurity: Not on file   Transportation Needs: No Transportation Needs    Lack of Transportation (Medical): No    Lack of Transportation (Non-Medical): No   Physical Activity: Not on file   Stress: Not on file   Social Connections: Not on file   Intimate Partner Violence: Not on file   Housing Stability: Not on file        Review of Systems   Constitutional: Negative for chills and fever  Respiratory: Negative for cough and shortness of breath  Cardiovascular: Negative for chest pain  Gastrointestinal: Negative for constipation, diarrhea, nausea and vomiting  Neurological: Positive for numbness           Objective:      BP (!) 171/78   Pulse 75   Ht 5' 9" (1 753 m) Comment: verbal  Wt 107 kg (236 lb)   BMI 34 85 kg/m²          Physical Exam  Vitals reviewed  Constitutional:       General: He is not in acute distress  Appearance: He is well-developed  He is not toxic-appearing or diaphoretic  Cardiovascular:      Rate and Rhythm: Normal rate and regular rhythm  Pulses:           Dorsalis pedis pulses are 1+ on the right side and 1+ on the left side  Posterior tibial pulses are 1+ on the right side and 1+ on the left side  Pulmonary:      Effort: Pulmonary effort is normal  No respiratory distress  Musculoskeletal:         General: Swelling, deformity and signs of injury present  No tenderness  Right foot: No foot drop  Left foot: No foot drop  Comments: Decreased edema left great toe  No pain on palpation left great toe  Feet:      Right foot:      Skin integrity: Dry skin present  No ulcer, skin breakdown, erythema, warmth or callus  Left foot:      Skin integrity: Dry skin present  No ulcer, skin breakdown, erythema, warmth or callus  Skin:     General: Skin is warm  Capillary Refill: Capillary refill takes less than 2 seconds  Coloration: Skin is not cyanotic or mottled  Findings: No ecchymosis or erythema  Rash is not purpuric  Nails: There is no clubbing  Comments: Thick mycotic nails X 7 with discoloration and onycholysis  HPK X 2 on left 1st met head and submet 2  He has class finding with previous toe amputation  Neurological:      General: No focal deficit present  Mental Status: He is alert and oriented to person, place, and time  Cranial Nerves: No cranial nerve deficit  Sensory: Sensory deficit present  Motor: No weakness  Psychiatric:         Mood and Affect: Mood normal          Behavior: Behavior normal          Thought Content:  Thought content normal          Judgment: Judgment normal

## 2022-05-17 NOTE — TELEPHONE ENCOUNTER
Yes, please call son    I spoke with Gladys Yates and he did not want to take zoloft anymore    His son was present during this particular video visit

## 2022-05-17 NOTE — TELEPHONE ENCOUNTER
CALLED LINDA, HE DID CONFIRM THAT DAD DOES NOT WANT TO TAKE THE ZOLOFT BUT THEY ALL FEEL DAD NEEDS IT    HE THEN ASKED ME TO ASK YOU IF YOU THINK IT'S NECESSARY     PLEASE ADVISE

## 2022-05-19 ENCOUNTER — PATIENT OUTREACH (OUTPATIENT)
Dept: INTERNAL MEDICINE CLINIC | Facility: CLINIC | Age: 62
End: 2022-05-19

## 2022-05-19 NOTE — PROGRESS NOTES
Spoke with patients marianela Wilcox  Lyric Payor is doing well  No questions or concerns about his health issues or medications

## 2022-05-20 ENCOUNTER — TELEPHONE (OUTPATIENT)
Dept: CARDIOLOGY CLINIC | Facility: CLINIC | Age: 62
End: 2022-05-20

## 2022-05-22 DIAGNOSIS — R42 DIZZINESS: ICD-10-CM

## 2022-05-22 DIAGNOSIS — R77.8 ELEVATED TROPONIN: ICD-10-CM

## 2022-05-22 DIAGNOSIS — I10 ESSENTIAL HYPERTENSION: Chronic | ICD-10-CM

## 2022-05-23 ENCOUNTER — TELEPHONE (OUTPATIENT)
Dept: CARDIOLOGY CLINIC | Facility: CLINIC | Age: 62
End: 2022-05-23

## 2022-05-23 ENCOUNTER — APPOINTMENT (EMERGENCY)
Dept: RADIOLOGY | Facility: HOSPITAL | Age: 62
End: 2022-05-23
Payer: MEDICARE

## 2022-05-23 ENCOUNTER — HOSPITAL ENCOUNTER (EMERGENCY)
Facility: HOSPITAL | Age: 62
Discharge: HOME | End: 2022-05-23
Attending: EMERGENCY MEDICINE
Payer: MEDICARE

## 2022-05-23 ENCOUNTER — APPOINTMENT (EMERGENCY)
Dept: CT IMAGING | Facility: HOSPITAL | Age: 62
End: 2022-05-23
Payer: MEDICARE

## 2022-05-23 VITALS
OXYGEN SATURATION: 99 % | SYSTOLIC BLOOD PRESSURE: 186 MMHG | TEMPERATURE: 98.1 F | RESPIRATION RATE: 18 BRPM | DIASTOLIC BLOOD PRESSURE: 86 MMHG | HEART RATE: 72 BPM

## 2022-05-23 DIAGNOSIS — I25.83 CORONARY ARTERY DISEASE DUE TO LIPID RICH PLAQUE: Primary | ICD-10-CM

## 2022-05-23 DIAGNOSIS — R10.84 ACUTE GENERALIZED ABDOMINAL PAIN: Primary | ICD-10-CM

## 2022-05-23 DIAGNOSIS — R07.9 CHEST PAIN: ICD-10-CM

## 2022-05-23 DIAGNOSIS — I25.10 CORONARY ARTERY DISEASE DUE TO LIPID RICH PLAQUE: Primary | ICD-10-CM

## 2022-05-23 LAB
ALBUMIN SERPL BCP-MCNC: 3.9 G/DL (ref 3.5–5)
ALP SERPL-CCNC: 72 U/L (ref 34–104)
ALT SERPL W P-5'-P-CCNC: 11 U/L (ref 7–52)
ANION GAP SERPL CALCULATED.3IONS-SCNC: 9 MMOL/L (ref 4–13)
APTT PPP: 31 SECONDS (ref 23–37)
AST SERPL W P-5'-P-CCNC: 11 U/L (ref 13–39)
BASOPHILS # BLD AUTO: 0.03 THOUSANDS/ΜL (ref 0–0.1)
BASOPHILS NFR BLD AUTO: 1 % (ref 0–1)
BILIRUB SERPL-MCNC: 0.53 MG/DL (ref 0.2–1)
BUN SERPL-MCNC: 56 MG/DL (ref 5–25)
CALCIUM SERPL-MCNC: 8.9 MG/DL (ref 8.4–10.2)
CHLORIDE SERPL-SCNC: 103 MMOL/L (ref 96–108)
CO2 SERPL-SCNC: 25 MMOL/L (ref 21–32)
CREAT SERPL-MCNC: 8.26 MG/DL (ref 0.6–1.3)
EOSINOPHIL # BLD AUTO: 0.17 THOUSAND/ΜL (ref 0–0.61)
EOSINOPHIL NFR BLD AUTO: 3 % (ref 0–6)
ERYTHROCYTE [DISTWIDTH] IN BLOOD BY AUTOMATED COUNT: 14.6 % (ref 11.6–15.1)
GFR SERPL CREATININE-BSD FRML MDRD: 6 ML/MIN/1.73SQ M
GLUCOSE SERPL-MCNC: 100 MG/DL (ref 65–140)
HCT VFR BLD AUTO: 35.2 % (ref 36.5–49.3)
HGB BLD-MCNC: 11.2 G/DL (ref 12–17)
IMM GRANULOCYTES # BLD AUTO: 0.1 THOUSAND/UL (ref 0–0.2)
IMM GRANULOCYTES NFR BLD AUTO: 2 % (ref 0–2)
INR PPP: 1 (ref 0.84–1.19)
LIPASE SERPL-CCNC: 24 U/L (ref 11–82)
LYMPHOCYTES # BLD AUTO: 1.05 THOUSANDS/ΜL (ref 0.6–4.47)
LYMPHOCYTES NFR BLD AUTO: 16 % (ref 14–44)
MCH RBC QN AUTO: 31.3 PG (ref 26.8–34.3)
MCHC RBC AUTO-ENTMCNC: 31.8 G/DL (ref 31.4–37.4)
MCV RBC AUTO: 98 FL (ref 82–98)
MONOCYTES # BLD AUTO: 0.64 THOUSAND/ΜL (ref 0.17–1.22)
MONOCYTES NFR BLD AUTO: 10 % (ref 4–12)
NEUTROPHILS # BLD AUTO: 4.44 THOUSANDS/ΜL (ref 1.85–7.62)
NEUTS SEG NFR BLD AUTO: 68 % (ref 43–75)
NRBC BLD AUTO-RTO: 0 /100 WBCS
PLATELET # BLD AUTO: 138 THOUSANDS/UL (ref 149–390)
PMV BLD AUTO: 10.2 FL (ref 8.9–12.7)
POTASSIUM SERPL-SCNC: 5.4 MMOL/L (ref 3.5–5.3)
PROT SERPL-MCNC: 7 G/DL (ref 6.4–8.4)
PROTHROMBIN TIME: 13.2 SECONDS (ref 11.6–14.5)
RBC # BLD AUTO: 3.58 MILLION/UL (ref 3.88–5.62)
SODIUM SERPL-SCNC: 137 MMOL/L (ref 135–147)
WBC # BLD AUTO: 6.43 THOUSAND/UL (ref 4.31–10.16)

## 2022-05-23 PROCEDURE — G1004 CDSM NDSC: HCPCS

## 2022-05-23 PROCEDURE — 71046 X-RAY EXAM CHEST 2 VIEWS: CPT

## 2022-05-23 PROCEDURE — 83690 ASSAY OF LIPASE: CPT | Performed by: EMERGENCY MEDICINE

## 2022-05-23 PROCEDURE — 85610 PROTHROMBIN TIME: CPT | Performed by: EMERGENCY MEDICINE

## 2022-05-23 PROCEDURE — 99284 EMERGENCY DEPT VISIT MOD MDM: CPT | Performed by: EMERGENCY MEDICINE

## 2022-05-23 PROCEDURE — 85025 COMPLETE CBC W/AUTO DIFF WBC: CPT | Performed by: EMERGENCY MEDICINE

## 2022-05-23 PROCEDURE — 80053 COMPREHEN METABOLIC PANEL: CPT | Performed by: EMERGENCY MEDICINE

## 2022-05-23 PROCEDURE — 74176 CT ABD & PELVIS W/O CONTRAST: CPT

## 2022-05-23 PROCEDURE — 36415 COLL VENOUS BLD VENIPUNCTURE: CPT | Performed by: EMERGENCY MEDICINE

## 2022-05-23 PROCEDURE — 99284 EMERGENCY DEPT VISIT MOD MDM: CPT

## 2022-05-23 PROCEDURE — 85730 THROMBOPLASTIN TIME PARTIAL: CPT | Performed by: EMERGENCY MEDICINE

## 2022-05-23 RX ORDER — CARVEDILOL 12.5 MG/1
TABLET ORAL
Qty: 180 TABLET | Refills: 1 | Status: SHIPPED | OUTPATIENT
Start: 2022-05-23 | End: 2022-06-11

## 2022-05-23 RX ORDER — CLOPIDOGREL BISULFATE 75 MG/1
75 TABLET ORAL DAILY
Qty: 90 TABLET | Refills: 3 | Status: SHIPPED | OUTPATIENT
Start: 2022-05-23 | End: 2022-06-11

## 2022-05-23 RX ORDER — CLOPIDOGREL BISULFATE 75 MG/1
75 TABLET ORAL DAILY
Qty: 90 TABLET | Refills: 2 | Status: SHIPPED | OUTPATIENT
Start: 2022-05-23 | End: 2022-05-23 | Stop reason: SDUPTHER

## 2022-05-23 RX ORDER — CLOPIDOGREL BISULFATE 75 MG/1
TABLET ORAL
Qty: 30 TABLET | Refills: 0 | OUTPATIENT
Start: 2022-05-23

## 2022-05-23 RX ORDER — CLOPIDOGREL BISULFATE 75 MG/1
75 TABLET ORAL DAILY
Qty: 90 TABLET | Refills: 2 | Status: SHIPPED | OUTPATIENT
Start: 2022-05-23

## 2022-05-23 NOTE — ED PROVIDER NOTES
History  Chief Complaint   Patient presents with    Abdominal Pain     All over Abd pain nausea and diarrhea started this morning around 8am  Supposed to receive dialysis today, last went Friday        History provided by:  Patient  Abdominal Pain  Pain location:  Generalized  Pain quality: aching    Pain radiates to:  Does not radiate  Pain severity:  Moderate  Onset quality:  Gradual  Duration:  1 week  Timing:  Constant  Progression:  Worsening  Chronicity:  New  Relieved by:  Not moving  Worsened by:  Position changes, movement and palpation  Ineffective treatments:  None tried  Associated symptoms: no chest pain, no chills, no constipation, no cough, no diarrhea, no dysuria, no fever, no nausea, no shortness of breath, no sore throat and no vomiting    Risk factors comment:  ESRD on HD      Prior to Admission Medications   Prescriptions Last Dose Informant Patient Reported? Taking? BD Pen Needle Adina U/F 32G X 4 MM MISC  Spouse/Significant Other No No   Sig: USE TO INJECT INSULIN 4 TIMES DAILY   Blood Glucose Monitoring Suppl (True Metrix Meter) w/Device KIT  Spouse/Significant Other No No   Sig: Use to test blood sugars 3 times daily   Blood Pressure Monitoring (BLOOD PRESSURE CUFF) MISC  Spouse/Significant Other No No   Sig: Use to check blood pressure before taking blood pressure medication and 1 hour after and follow instructions provided in discharge instructions based on the readings  Cholecalciferol (Vitamin D3) 1 25 MG (14836 UT) CAPS  Spouse/Significant Other No No   Sig: TAKE 1 CAPSULE BY MOUTH ONE TIME PER WEEK   Continuous Blood Gluc  (DEXCOM G6 ) LANCE  Spouse/Significant Other No No   Sig: Use as directed for continuous glucose monitoring   Continuous Blood Gluc Sensor (DEXCOM G6 SENSOR) MISC  Spouse/Significant Other No No   Sig: Use as directed for continuous glucose monitoring   Change every 10 days   Continuous Blood Gluc Transmit (DEXCOM G6 TRANSMITTER) MISC Spouse/Significant Other No No   Sig: Use as directed for continuous glucose monitoring-Change every 3 months   Incontinence Supplies (MALE URINAL) MISC  Spouse/Significant Other No No   Sig: by Does not apply route daily   Patient not taking: No sig reported   Insulin Syringe-Needle U-100 (B-D INS SYRINGE 0 5CC/30GX1/2") 30G X 1/2" 0 5 ML MISC  Spouse/Significant Other No No   Sig: Inject under the skin 4 (four) times a day   Lancets Ultra Thin 30G MISC  Spouse/Significant Other No No   Sig: Use 3 times a day   Lantus 100 UNIT/ML subcutaneous injection  Spouse/Significant Other No No   Sig: INJECT 14 UNITS UNDER THE SKIN DAILY   Nutritional Supplements (VITAMIN D BOOSTER PO)  Spouse/Significant Other Yes No   Sig: Take 0 5 mcg by mouth     ONETOUCH DELICA LANCETS 66B MISC  Spouse/Significant Other No No   Sig: by Does not apply route 3 (three) times a day   Sevelamer Carbonate 2 4 g PACK  Spouse/Significant Other Yes No   Sig: Take 1 packet by mouth Three times a day   TORSEMIDE PO  Spouse/Significant Other Yes No   Sig: Take 50 mg by mouth Pt takes 50 mg daily on non- dialysis days: sat, sun, Tues, and thrusday  Pt takes 10 mg of torsemide daily on dialysis days m- w- f      True Metrix Blood Glucose Test test strip   No No   Sig: USE 1 EACH 3 (THREE) TIMES A DAY USE AS INSTRUCTED   aluminum-magnesium hydroxide-simethicone (MYLANTA) 200-200-20 mg/5 mL suspension  Spouse/Significant Other No No   Sig: Take 30 mL by mouth every 4 (four) hours as needed for indigestion or heartburn   aspirin (ECOTRIN LOW STRENGTH) 81 mg EC tablet  Spouse/Significant Other Yes No   Sig: Take 81 mg by mouth daily Resume on 8/14     atorvastatin (LIPITOR) 40 mg tablet  Spouse/Significant Other No No   Sig: Take 1 tablet (40 mg total) by mouth daily with dinner   calcium acetate (CALPHRON) 667 mg  Spouse/Significant Other Yes No   Sig: TAKE 2 TABLETS BY MOUTH THREE TIMES DAILY WITH MEALS   calcium carbonate (TUMS) 500 mg chewable tablet Spouse/Significant Other No No   Sig: Chew 1 tablet (500 mg total) daily as needed for indigestion or heartburn   carvedilol (COREG) 12 5 mg tablet  Spouse/Significant Other No No   Sig: Take 1 tablet (12 5 mg total) by mouth 2 (two) times a day with meals   clopidogrel (PLAVIX) 75 mg tablet   No No   Sig: Take 1 tablet (75 mg total) by mouth in the morning  clopidogrel (Plavix) 75 mg tablet   No No   Sig: Take 1 tablet (75 mg total) by mouth in the morning     doxercalciferol (HECTOROL) 0 5 mcg capsule  Spouse/Significant Other Yes No   Si mcg   Patient not taking: No sig reported   insulin lispro (HumaLOG KwikPen) 100 units/mL injection pen  Spouse/Significant Other No No   Sig: INJECT 4 UNITS 3 TIMES A DAY WITH MEALS PLUS SCALE (max daily dose 42 units)   meclizine (ANTIVERT) 25 mg tablet  Spouse/Significant Other No No   Sig: Take 1 tablet (25 mg total) by mouth every 8 (eight) hours as needed for dizziness or nausea   sertraline (Zoloft) 25 mg tablet   No No   Sig: Take 1/2 tablet every day at bedtime for 4 days, then increase to 1 tablet every day at bedtime      Facility-Administered Medications: None       Past Medical History:   Diagnosis Date    Cerebrovascular accident (CVA) due to thrombosis of left middle cerebral artery ([facility] ) 2018    Chronic kidney disease     Diabetes [facility]     GERD (gastroesophageal reflux disease)     Hypercholesteremia     Hyperlipidemia     Hypertension     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    Neuropathy     Obesity     Osteomyelitis ([facility] )     last assessed 16    PVC's (premature ventricular contractions)     sees cardiology Dr Jessica camargo    Stroke [facility])     last weeof 2018 [facility]    TIA (transient ischemic attack) 10/28/2018       Past Surgical History:   Procedure Laterality Date    ABDOMINAL SURGERY      CARDIAC CATHETERIZATION N/A 2022    Procedure: Cardiac Coronary Angiogram;  Surgeon: Pili Mills Nicole Peterson MD;  [facility]; Service: Cardiology    CARDIAC CATHETERIZATION N/A 5/2/2022    Procedure: Cardiac pci;  Surgeon: Lashaun Bedoya MD;  [facility]; Service: Cardiology    CHOLECYSTECTOMY      Percutaneous    COLONOSCOPY      CYSTOSCOPY      OTHER SURGICAL HISTORY      "stimulator to control bowel movements"    ME ESOPHAGOGASTRODUODENOSCOPY TRANSORAL DIAGNOSTIC N/A 9/27/2016    Procedure: ESOPHAGOGASTRODUODENOSCOPY (EGD); Surgeon: Cecy Henry MD;  Location: [facility] GI [facility]; Service: Gastroenterology    ME LAP,CHOLECYSTECTOMY N/A 2/29/2016    Procedure: LAPAROSCOPIC CHOLECYSTECTOMY ;  Surgeon: Jodie Garcia DO;  Location: [facility] Main [facility];  Service: General    ROTATOR CUFF REPAIR Right     TOE AMPUTATION Right 10/28/2016    Procedure: 3RD TOE AMPUTATION ;  Surgeon: Serena Martinez DPM;  Location: [facility];  Service:        Family History   Problem Relation Age of Onset    Leukemia Mother     Liver disease Mother     Lung cancer Mother         heavy smoker - 3 ppd    Heart disease Father     Liver disease Father     Multiple myeloma Sister     Breast cancer Sister     Urolithiasis Family     Alcohol abuse Neg Hx     Depression Neg Hx     Drug abuse Neg Hx     Substance Abuse Neg Hx     Mental illness Neg Hx      I have reviewed and agree with the history as documented  E-Cigarette/Vaping    E-Cigarette Use Never User      E-Cigarette/Vaping Substances    Nicotine No     THC No     CBD No     Flavoring No     Other No     Unknown No      Social History     Tobacco Use    Smoking status: Never Smoker    Smokeless tobacco: Never Used   Vaping Use    Vaping Use: Never used   Substance Use Topics    Alcohol use: Not Currently    Drug use: No       Review of Systems   Constitutional: Negative for activity change, chills, diaphoresis and fever  HENT: Negative for congestion, sinus pressure and sore throat      Eyes: Negative for pain and visual disturbance  Respiratory: Negative for cough, chest tightness, shortness of breath, wheezing and stridor  Cardiovascular: Negative for chest pain and palpitations  Gastrointestinal: Positive for abdominal pain  Negative for abdominal distention, constipation, diarrhea, nausea and vomiting  Genitourinary: Negative for dysuria and frequency  Musculoskeletal: Negative for neck pain and neck stiffness  Skin: Negative for rash  Neurological: Negative for dizziness, speech difficulty, light-headedness, numbness and headaches  Physical Exam  Physical Exam  Vitals reviewed  Constitutional:       General: He is not in acute distress  Appearance: He is well-developed  He is not diaphoretic  HENT:      Head: Normocephalic and atraumatic  Right Ear: External ear normal       Left Ear: External ear normal       Nose: Nose normal    Eyes:      General:         Right eye: No discharge  Left eye: No discharge  Pupils: Pupils are equal, round, and reactive to light  Neck:      Trachea: No tracheal deviation  Cardiovascular:      Rate and Rhythm: Normal rate and regular rhythm  Heart sounds: Normal heart sounds  No murmur heard  Pulmonary:      Effort: Pulmonary effort is normal  No respiratory distress  Breath sounds: Normal breath sounds  No stridor  Abdominal:      General: There is distension  Palpations: Abdomen is soft  Tenderness: There is abdominal tenderness in the left lower quadrant  There is no guarding or rebound  Musculoskeletal:         General: Normal range of motion  Cervical back: Normal range of motion and neck supple  Skin:     General: Skin is warm and dry  Coloration: Skin is not pale  Findings: No erythema  Neurological:      General: No focal deficit present  Mental Status: He is alert and oriented to person, place, and time           Vital Signs  ED Triage Vitals [05/23/22 1034]   Temperature Pulse Respirations Blood Pressure SpO2   98 1 °F (36 7 °C) 78 18 (!) 209/84 98 %      Temp Source Heart Rate Source Patient Position - Orthostatic VS BP Location FiO2 (%)   Oral Monitor Sitting Right arm --      Pain Score       8           Vitals:    05/23/22 1034 05/23/22 1230   BP: (!) 209/84 (!) 186/86   Pulse: 78 72   Patient Position - Orthostatic VS: Sitting Sitting         Visual Acuity      ED Medications  Medications - No data to display    Diagnostic Studies  Results Reviewed     Procedure Component Value Units Date/Time    Lipase [[id number]]  (Normal) Collected: 05/23/22 1255    Lab Status: Final result Specimen: Blood from Arm, Right Updated: 05/23/22 1327     Lipase 24 u/L     Comprehensive metabolic panel [[id number]]  (Abnormal) Collected: 05/23/22 1255    Lab Status: Final result Specimen: Blood from Arm, Right Updated: 05/23/22 1327     Sodium 137 mmol/L      Potassium 5 4 mmol/L      Chloride 103 mmol/L      CO2 25 mmol/L      ANION GAP 9 mmol/L      BUN 56 mg/dL      Creatinine 8 26 mg/dL      Glucose 100 mg/dL      Calcium 8 9 mg/dL      AST 11 U/L      ALT 11 U/L      Alkaline Phosphatase 72 U/L      Total Protein 7 0 g/dL      Albumin 3 9 g/dL      Total Bilirubin 0 53 mg/dL      eGFR 6 ml/min/1 73sq m     Narrative:      Romel guidelines for Chronic Kidney Disease (CKD):     Stage 1 with normal or high GFR (GFR > 90 mL/min/1 73 square meters)    Stage 2 Mild CKD (GFR = 60-89 mL/min/1 73 square meters)    Stage 3A Moderate CKD (GFR = 45-59 mL/min/1 73 square meters)    Stage 3B Moderate CKD (GFR = 30-44 mL/min/1 73 square meters)    Stage 4 Severe CKD (GFR = 15-29 mL/min/1 73 square meters)    Stage 5 End Stage CKD (GFR <15 mL/min/1 73 square meters)  Note: GFR calculation is accurate only with a steady state creatinine    Protime-INR [[id number]]  (Normal) Collected: 05/23/22 1117    Lab Status: Final result Specimen: Blood from Arm, Right Updated: 05/23/22 1141     Protime 13 2 seconds      INR 1 00    APTT [[id number]]  (Normal) Collected: 05/23/22 1117    Lab Status: Final result Specimen: Blood from Arm, Right Updated: 05/23/22 1141     PTT 31 seconds     CBC and differential [[id number]]  (Abnormal) Collected: 05/23/22 1117    Lab Status: Final result Specimen: Blood from Arm, Right Updated: 05/23/22 1128     WBC 6 43 Thousand/uL      RBC 3 58 Million/uL      Hemoglobin 11 2 g/dL      Hematocrit 35 2 %      MCV 98 fL      MCH 31 3 pg      MCHC 31 8 g/dL      RDW 14 6 %      MPV 10 2 fL      Platelets 098 Thousands/uL      nRBC 0 /100 WBCs      Neutrophils Relative 68 %      Immat GRANS % 2 %      Lymphocytes Relative 16 %      Monocytes Relative 10 %      Eosinophils Relative 3 %      Basophils Relative 1 %      Neutrophils Absolute 4 44 Thousands/µL      Immature Grans Absolute 0 10 Thousand/uL      Lymphocytes Absolute 1 05 Thousands/µL      Monocytes Absolute 0 64 Thousand/µL      Eosinophils Absolute 0 17 Thousand/µL      Basophils Absolute 0 03 Thousands/µL                  CT abdomen pelvis wo contrast   Final Result by Jasper Baker MD (05/23 1241)      No evidence of acute abdominopelvic process  Workstation performed: [id number]         XR chest 2 views   ED Interpretation by Alfa Hagen DO (05/23 1307)   No acute pathology      Final Result by Migue Cardenas DO (05/23 1311)      No acute cardiopulmonary disease  Workstation performed: [id number]                    Procedures  Procedures         ED Course                               SBIRT 20yo+    Flowsheet Row Most Recent Value   SBIRT (23 yo +)    In order to provide better care to our patients, we are screening all of our patients for alcohol and drug use  Would it be okay to ask you these screening questions? No Filed at: 05/23/2022 1043   Initial Alcohol Screen: US AUDIT-C     1  How often do you have a drink containing alcohol? 0 Filed at: 05/23/2022 1043   3a   Male UNDER 65: How often do you have five or more drinks on one occasion? 0 Filed at: 05/23/2022 1043   3b  FEMALE Any Age, or MALE 65+: How often do you have 4 or more drinks on one occassion? 0 Filed at: 05/23/2022 1043   Audit-C Score 0 Filed at: 05/23/2022 1043   XIN: How many times in the past year have you    Used an illegal drug or used a prescription medication for non-medical reasons? Never Filed at: 05/23/2022 1043                    MDM  Number of Diagnoses or Management Options  Acute generalized abdominal pain: new and requires workup  Diagnosis management comments:       Initial ED assessment:  77-year-old presents with abdominal pain and distension, tender in the left lower quadrant    Initial DDx includes but is not limited to:   Hernia, ascites, mesenteric adenitis, epiploic appendagitis, nephrolithiasis     Initial ED plan:   Diverticulitis, colitis, atypical appendicitis        Final ED summary/disposition:   After evaluation and workup in the emergency department, workup unremarkable  , patient and wife agree to call dialysis center upon discharge to arrange for urgent dialysis today or tomorrow  , will DC         Amount and/or Complexity of Data Reviewed  Clinical lab tests: ordered and reviewed  Tests in the radiology section of CPT®: ordered and reviewed  Decide to obtain previous medical records or to obtain history from someone other than the patient: yes  Obtain history from someone other than the patient: yes  Review and summarize past medical records: yes  Independent visualization of images, tracings, or specimens: yes        Disposition  Final diagnoses:   Acute generalized abdominal pain     Time reflects when diagnosis was documented in both MDM as applicable and the Disposition within this note     Time User Action Codes Description Comment    5/23/2022  1:35 PM Joshua Mayes Add [R10 [id number]] Acute generalized abdominal pain       ED Disposition     ED Disposition   Discharge Condition   Stable    Date/Time   Mon May 23, 2022  1:35 PM    Comment   [facility]rey [facility] discharge to home/self care  Follow-up Information    None         Patient's Medications   Discharge Prescriptions    CLOPIDOGREL (PLAVIX) 75 MG TABLET    Take 1 tablet (75 mg total) by mouth in the morning  Start Date: 5/23/2022 End Date: --       Order Dose: 75 mg       Quantity: 90 tablet    Refills: 2       No discharge procedures on file      PDMP Review       Value Time User    PDMP Reviewed  Yes 12/30/2020 10:40 PM Ernie Chen MD          ED Provider  Electronically Signed by           Arlin Stauffer DO  05/23/22 0543

## 2022-05-23 NOTE — TELEPHONE ENCOUNTER
clopidogrel (PLAVIX) 75 mg tablet  NEED REFILLS OF THE ABOVE MEDS:     GOING TO [facility]   90-DAY SUPLLY +REFILLS

## 2022-05-23 NOTE — DISCHARGE INSTRUCTIONS
Please call your dialysis center and see if you can get dialysis later today if not need to be scheduled for 1st thing tomorrow

## 2022-05-24 ENCOUNTER — HOSPITAL ENCOUNTER (EMERGENCY)
Facility: HOSPITAL | Age: 62
Discharge: HOME | End: 2022-05-24
Attending: EMERGENCY MEDICINE | Admitting: EMERGENCY MEDICINE
Payer: MEDICARE

## 2022-05-24 VITALS
SYSTOLIC BLOOD PRESSURE: 111 MMHG | TEMPERATURE: 98.5 F | RESPIRATION RATE: 18 BRPM | OXYGEN SATURATION: 97 % | HEART RATE: 86 BPM | DIASTOLIC BLOOD PRESSURE: 71 MMHG

## 2022-05-24 DIAGNOSIS — R05.9 COUGH: Primary | ICD-10-CM

## 2022-05-24 LAB
FLUAV RNA RESP QL NAA+PROBE: NEGATIVE
FLUBV RNA RESP QL NAA+PROBE: NEGATIVE
RSV RNA RESP QL NAA+PROBE: NEGATIVE
SARS-COV-2 RNA RESP QL NAA+PROBE: NEGATIVE

## 2022-05-24 PROCEDURE — 99283 EMERGENCY DEPT VISIT LOW MDM: CPT

## 2022-05-24 PROCEDURE — 0241U HB NFCT DS VIR RESP RNA 4 TRGT: CPT

## 2022-05-24 PROCEDURE — 99284 EMERGENCY DEPT VISIT MOD MDM: CPT | Performed by: EMERGENCY MEDICINE

## 2022-05-24 NOTE — ED PROVIDER NOTES
History  Chief Complaint   Patient presents with    Cough     Patient states that he has had a cough for approx "a couple days"  Per patient family when patient coughs he throws up  Per patient dialysis wants him to get a covid test      Promise Henry comes to the emergency department after being told by his dialysis center that he needed to come to the emergency department for evaluation for a COVID test given his persistence of cough while at the dialysis center  Patient was seen 1 day prior (5/24) in which he received multiple imaging studies and laboratory studies for evaluation of his cough  Patient was deemed stable for discharge home at that time  Patient states that his symptoms have not changed since he was previously discharged from the emergency department  However, the cough has persisted wall he has been at home and he was instructed that he needed a COVID swab for continued evaluation if he was to receive dialysis  Patient is accompanied by his spouse at the bedside who expressed concern for the need for a COVID swab in order that they could potentially schedule for dialysis the following day (5/26)  At time of interview in the emergency department, patient denies any fevers, chills, difficulty catching his breath, chest pains, changes in urination, changes in bowel movement, new rashes, numbness, or paresthesias  Patient states that he has had no new symptom occurrence since being seen in the emergency department the previous day        History provided by:  Patient   used: No    Cough  Cough characteristics:  Non-productive  Sputum characteristics:  Nondescript  Severity:  Moderate  Onset quality:  Gradual  Duration:  5 days  Timing:  Constant  Progression:  Unchanged  Chronicity:  New  Smoker: no    Context: not animal exposure, not exposure to allergens, not fumes, not occupational exposure, not sick contacts, not smoke exposure, not upper respiratory infection, not weather changes and not with activity    Relieved by:  Nothing  Worsened by:  Nothing  Ineffective treatments:  None tried  Associated symptoms: no chest pain, no chills, no diaphoresis, no ear fullness, no ear pain, no eye discharge, no fever, no headaches, no myalgias, no rash, no rhinorrhea, no shortness of breath, no sinus congestion, no sore throat, no weight loss and no wheezing        Prior to Admission Medications   Prescriptions Last Dose Informant Patient Reported? Taking? BD Pen Needle Adina U/F 32G X 4 MM MISC  Spouse/Significant Other No No   Sig: USE TO INJECT INSULIN 4 TIMES DAILY   Blood Glucose Monitoring Suppl (True Metrix Meter) w/Device KIT  Spouse/Significant Other No No   Sig: Use to test blood sugars 3 times daily   Blood Pressure Monitoring (BLOOD PRESSURE CUFF) MISC  Spouse/Significant Other No No   Sig: Use to check blood pressure before taking blood pressure medication and 1 hour after and follow instructions provided in discharge instructions based on the readings  Cholecalciferol (Vitamin D3) 1 25 MG (40040 UT) CAPS  Spouse/Significant Other No No   Sig: TAKE 1 CAPSULE BY MOUTH ONE TIME PER WEEK   Continuous Blood Gluc  (DEXCOM G6 ) LANCE  Spouse/Significant Other No No   Sig: Use as directed for continuous glucose monitoring   Continuous Blood Gluc Sensor (DEXCOM G6 SENSOR) MISC  Spouse/Significant Other No No   Sig: Use as directed for continuous glucose monitoring   Change every 10 days   Continuous Blood Gluc Transmit (DEXCOM G6 TRANSMITTER) MISC  Spouse/Significant Other No No   Sig: Use as directed for continuous glucose monitoring-Change every 3 months   Incontinence Supplies (MALE URINAL) MISC  Spouse/Significant Other No No   Sig: by Does not apply route daily   Patient not taking: No sig reported   Insulin Syringe-Needle U-100 (B-D INS SYRINGE 0 5CC/30GX1/2") 30G X 1/2" 0 5 ML MISC  Spouse/Significant Other No No   Sig: Inject under the skin 4 (four) times a day Lancets Ultra Thin 30G MISC  Spouse/Significant Other No No   Sig: Use 3 times a day   Lantus 100 UNIT/ML subcutaneous injection  Spouse/Significant Other No No   Sig: INJECT 14 UNITS UNDER THE SKIN DAILY   Nutritional Supplements (VITAMIN D BOOSTER PO)  Spouse/Significant Other Yes No   Sig: Take 0 5 mcg by mouth     ONETOUCH DELICA LANCETS 32G MISC  Spouse/Significant Other No No   Sig: by Does not apply route 3 (three) times a day   Sevelamer Carbonate 2 4 g PACK  Spouse/Significant Other Yes No   Sig: Take 1 packet by mouth Three times a day   TORSEMIDE PO  Spouse/Significant Other Yes No   Sig: Take 50 mg by mouth Pt takes 50 mg daily on non- dialysis days: sat, sun, Tues, and thrusday  Pt takes 10 mg of torsemide daily on dialysis days m- w- f  True Metrix Blood Glucose Test test strip   No No   Sig: USE 1 EACH 3 (THREE) TIMES A DAY USE AS INSTRUCTED   aluminum-magnesium hydroxide-simethicone (MYLANTA) 200-200-20 mg/5 mL suspension  Spouse/Significant Other No No   Sig: Take 30 mL by mouth every 4 (four) hours as needed for indigestion or heartburn   aspirin (ECOTRIN LOW STRENGTH) 81 mg EC tablet  Spouse/Significant Other Yes No   Sig: Take 81 mg by mouth daily Resume on 8/14     atorvastatin (LIPITOR) 40 mg tablet  Spouse/Significant Other No No   Sig: Take 1 tablet (40 mg total) by mouth daily with dinner   calcium acetate (CALPHRON) 667 mg  Spouse/Significant Other Yes No   Sig: TAKE 2 TABLETS BY MOUTH THREE TIMES DAILY WITH MEALS   calcium carbonate (TUMS) 500 mg chewable tablet  Spouse/Significant Other No No   Sig: Chew 1 tablet (500 mg total) daily as needed for indigestion or heartburn   carvedilol (COREG) 12 5 mg tablet   No No   Sig: TAKE 1 TABLET BY MOUTH TWICE A DAY WITH FOOD   clopidogrel (PLAVIX) 75 mg tablet   No No   Sig: Take 1 tablet (75 mg total) by mouth in the morning  clopidogrel (Plavix) 75 mg tablet   No No   Sig: Take 1 tablet (75 mg total) by mouth in the morning  doxercalciferol (HECTOROL) 0 5 mcg capsule  Spouse/Significant Other Yes No   Si mcg   Patient not taking: No sig reported   insulin lispro (HumaLOG KwikPen) 100 units/mL injection pen  Spouse/Significant Other No No   Sig: INJECT 4 UNITS 3 TIMES A DAY WITH MEALS PLUS SCALE (max daily dose 42 units)   meclizine (ANTIVERT) 25 mg tablet  Spouse/Significant Other No No   Sig: Take 1 tablet (25 mg total) by mouth every 8 (eight) hours as needed for dizziness or nausea   sertraline (Zoloft) 25 mg tablet   No No   Sig: Take 1/2 tablet every day at bedtime for 4 days, then increase to 1 tablet every day at bedtime      Facility-Administered Medications: None       Past Medical History:   Diagnosis Date    Cerebrovascular accident (CVA) due to thrombosis of left middle cerebral artery ([facility] ) 2018    Chronic kidney disease     Diabetes mellitus ([facility] )     GERD (gastroesophageal reflux disease)     Hypercholesteremia     Hyperlipidemia     Hypertension     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    Neuropathy     Obesity     Osteomyelitis ([facility] )     last assessed 16    PVC's (premature ventricular contractions)     sees cardiology Dr Erick camargo    Stroke [facility])     [facility] we[facility]of 2018 [facility]    TIA (transient ischemic attack) 10/28/2018       Past Surgical History:   Procedure Laterality Date    ABDOMINAL SURGERY      CARDIAC CATHETERIZATION N/A 2022    Procedure: Cardiac Coronary Angiogram;  Surgeon: Maci Pederson MD;  [facility]; Service: Cardiology    CARDIAC CATHETERIZATION N/A 2022    Procedure: Cardiac pci;  Surgeon: Maci Pederson MD;  [facility];   Service: Cardiology    CHOLECYSTECTOMY      Percutaneous    COLONOSCOPY      CYSTOSCOPY      OTHER SURGICAL HISTORY      "stimulator to control bowel movements"    GA ESOPHAGOGASTRODUODENOSCOPY TRANSORAL DIAGNOSTIC N/A 2016    Procedure: ESOPHAGOGASTRODUODENOSCOPY (EGD); Surgeon: Maciej Nguyen MD;  Location: [facility] GI LAB; Service: Gastroenterology    AK LAP,CHOLECYSTECTOMY N/A 2/29/2016    Procedure: LAPAROSCOPIC CHOLECYSTECTOMY ;  Surgeon: Darline Lange DO;  Location: [facility] Main [facility];  Service: General    ROTATOR CUFF REPAIR Right     TOE AMPUTATION Right 10/28/2016    Procedure: 3RD TOE AMPUTATION ;  Surgeon: Amarjit Mendoza DPM;  Location[facility];  Service:        Family History   Problem Relation Age of Onset    Leukemia Mother     Liver disease Mother     Lung cancer Mother         heavy smoker - 3 ppd    Heart disease Father     Liver disease Father     Multiple myeloma Sister     Breast cancer Sister     Urolithiasis Family     Alcohol abuse Neg Hx     Depression Neg Hx     Drug abuse Neg Hx     Substance Abuse Neg Hx     Mental illness Neg Hx      I have reviewed and agree with the history as documented  E-Cigarette/Vaping    E-Cigarette Use Never User      E-Cigarette/Vaping Substances    Nicotine No     THC No     CBD No     Flavoring No     Other No     Unknown No      Social History     Tobacco Use    Smoking status: Never Smoker    Smokeless tobacco: Never Used   Vaping Use    Vaping Use: Never used   Substance Use Topics    Alcohol use: Not Currently    Drug use: No        Review of Systems   Constitutional: Negative for chills, diaphoresis, fever and weight loss  HENT: Negative for ear pain, rhinorrhea and sore throat  Eyes: Negative for pain, discharge and visual disturbance  Respiratory: Positive for cough  Negative for shortness of breath and wheezing  Cardiovascular: Negative for chest pain and palpitations  Gastrointestinal: Negative for abdominal pain and vomiting  Genitourinary: Negative for dysuria and hematuria  Musculoskeletal: Negative for arthralgias, back pain and myalgias  Skin: Negative for color change and rash  Neurological: Negative for seizures, syncope and headaches     All other systems reviewed and are negative  Physical Exam  ED Triage Vitals [05/24/22 1504]   Temperature Pulse Respirations Blood Pressure SpO2   98 5 °F (36 9 °C) 86 18 111/71 97 %      Temp Source Heart Rate Source Patient Position - Orthostatic VS BP Location FiO2 (%)   Oral Monitor Sitting Right arm --      Pain Score       --             Orthostatic Vital Signs  Vitals:    05/24/22 1504   BP: 111/71   Pulse: 86   Patient Position - Orthostatic VS: Sitting       Physical Exam  Vitals and nursing note reviewed  Constitutional:       Appearance: He is well-developed  HENT:      Head: Normocephalic and atraumatic  Right Ear: External ear normal       Left Ear: External ear normal       Nose: Nose normal       Mouth/Throat:      Mouth: Mucous membranes are dry  Eyes:      Extraocular Movements: Extraocular movements intact  Conjunctiva/sclera: Conjunctivae normal    Cardiovascular:      Rate and Rhythm: Normal rate and regular rhythm  Pulses: Normal pulses  Pulmonary:      Effort: Pulmonary effort is normal  No respiratory distress  Breath sounds: Normal breath sounds  Abdominal:      Palpations: Abdomen is soft  Tenderness: There is no abdominal tenderness  There is no guarding or rebound  Musculoskeletal:         General: No tenderness, deformity or signs of injury  Normal range of motion  Cervical back: Normal range of motion and neck supple  Skin:     General: Skin is warm and dry  Capillary Refill: Capillary refill takes less than 2 seconds  Neurological:      General: No focal deficit present  Mental Status: He is alert and oriented to person, place, and time     Psychiatric:         Mood and Affect: Mood normal          ED Medications  Medications - No data to display    Diagnostic Studies  Results Reviewed     Procedure Component Value Units Date/Time    COVID/FLU/RSV - 2 hour TAT [[id number]]  (Normal) Collected: 05/24/22 1610    Lab Status: Final result Specimen: Nares from Nose Updated: 05/24/22 1743     SARS-CoV-2 Negative     INFLUENZA A PCR Negative     INFLUENZA B PCR Negative     RSV PCR Negative    Narrative:      FOR PEDIATRIC PATIENTS - copy/paste COVID Guidelines URL to browser: [email or web address]    SARS-CoV-2 assay is a Nucleic Acid Amplification assay intended for the  qualitative detection of nucleic acid from SARS-CoV-2 in nasopharyngeal  swabs  Results are for the presumptive identification of SARS-CoV-2 RNA  Positive results are indicative of infection with SARS-CoV-2, the virus  causing COVID-19, but do not rule out bacterial infection or co-infection  with other viruses  Laboratories within the [place] and its  territories are required to report all positive results to the appropriate  public health authorities  Negative results do not preclude SARS-CoV-2  infection and should not be used as the sole basis for treatment or other  patient management decisions  Negative results must be combined with  clinical observations, patient history, and epidemiological information  This test has not been FDA cleared or approved  This test has been authorized by FDA under an Emergency Use Authorization  (EUA)  This test is only authorized for the duration of time the  declaration that circumstances exist justifying the authorization of the  emergency use of an in vitro diagnostic tests for detection of SARS-CoV-2  virus and/or diagnosis of COVID-19 infection under section 564(b)(1) of  the Act, 21 U  S C  265JDC-5(P)(3), unless the authorization is terminated  or revoked sooner  The test has been validated but independent review by FDA  and CLIA is pending  Test performed using [employer] GeneXpert: This RT-PCR assay targets N2,  a region unique to SARS-CoV-2  A conserved region in the E-gene was chosen  for pan-Sarbecovirus detection which includes SARS-CoV-2                   No orders to display         Procedures  Procedures      ED Course                                       MDM  Number of Diagnoses or Management Options  Cough: established and worsening  Diagnosis management comments: Sonal Lazcano comes to the emergency department see COVID swab after being instructed by his dialysis center that he needed to receive a COVID swab prior to receiving dialysis given his persistent cough  Patient was evaluated the previous day and deemed stable for discharge home at that time in anticipation of getting dialysis today (5/25)  COVID swab was collected in the emergency department  Discussion was had with both patient and spouse at the bedside with regards to waiting for the COVID screen or being called with the information at their residence when the test results populated  Secondary to having a long day at work, and a desire to return home, it was decided that the patient will be discharged given clinical stability and be called with test results  Test results came back negative for COVID later in the day  I spoke with the spouse on the phone and expressed these negative results in anticipation that they can get scheduled for dialysis the following day (5/26)  Spouse expressed thanks for being contact with the results and said that she would go with attempting to get patient scheduled for dialysis as discussed  Disposition:  Discharged home with pending COVID swab that came back negative         Amount and/or Complexity of Data Reviewed  Clinical lab tests: ordered and reviewed  Obtain history from someone other than the patient: yes  Review and summarize past medical records: yes  Discuss the patient with other providers: yes  Independent visualization of images, tracings, or specimens: yes    Risk of Complications, Morbidity, and/or Mortality  Presenting problems: moderate  Diagnostic procedures: moderate  Management options: moderate    Patient Progress  Patient progress: stable      Disposition  Final diagnoses:   Cough     Time reflects when diagnosis was documented in both MDM as applicable and the Disposition within this note     Time User Action Codes Description Comment    5/24/2022  4:07 PM Rebecca Hallman Add [R05 [id number]] Cough       ED Disposition     ED Disposition   Discharge    Condition   Stable    Date/Time   Tue May 24, 2022  4:07 PM    Comment   Clarice Roach [facility] discharge to home/self care  Follow-up Information     Follow up With Specialties Details Why Contact Info Additional [address], DO Internal Medicine Schedule an appointment as soon as possible for a visit  As needed [address]  [phone]       [facility] Emergency Department Emergency Medicine  As needed, If symptoms worsen [address] Emergency Department, [address], [place], [place], [address]          Discharge Medication List as of 5/24/2022  4:08 PM      CONTINUE these medications which have NOT CHANGED    Details   aluminum-magnesium hydroxide-simethicone (MYLANTA) 200-200-20 mg/5 mL suspension Take 30 mL by mouth every 4 (four) hours as needed for indigestion or heartburn, Starting Thu 4/21/2022, Normal      aspirin (ECOTRIN LOW STRENGTH) 81 mg EC tablet Take 81 mg by mouth daily Resume on 8/14  , Historical Med      atorvastatin (LIPITOR) 40 mg tablet Take 1 tablet (40 mg total) by mouth daily with dinner, Starting Mon 12/13/2021, Normal      BD Pen Needle Adina U/F 32G X 4 MM MISC USE TO INJECT INSULIN 4 TIMES DAILY, Normal      Blood Glucose Monitoring Suppl (True Metrix Meter) w/Device KIT Use to test blood sugars 3 times daily, Normal      Blood Pressure Monitoring (BLOOD PRESSURE CUFF) MISC Use to check blood pressure before taking blood pressure medication and 1 hour after and follow instructions provided in discharge instructions based on the readings  , Print      calcium acetate (CALPHRON) 667 mg TAKE 2 TABLETS BY MOUTH THREE TIMES DAILY WITH MEALS, Historical Med      calcium carbonate (TUMS) 500 mg chewable tablet Chew 1 tablet (500 mg total) daily as needed for indigestion or heartburn, Starting Thu 4/21/2022, No Print      carvedilol (COREG) 12 5 mg tablet TAKE 1 TABLET BY MOUTH TWICE A DAY WITH FOOD, Normal      Cholecalciferol (Vitamin D3) 1 25 MG (97409 UT) CAPS TAKE 1 CAPSULE BY MOUTH ONE TIME PER WEEK, Normal      !! clopidogrel (PLAVIX) 75 mg tablet Take 1 tablet (75 mg total) by mouth in the morning , Starting Mon 5/23/2022, Normal      !! clopidogrel (Plavix) 75 mg tablet Take 1 tablet (75 mg total) by mouth in the morning , Starting Mon 5/23/2022, Normal      Continuous Blood Gluc  (DEXCOM G6 ) LANCE Use as directed for continuous glucose monitoring, Normal      Continuous Blood Gluc Sensor (DEXCOM G6 SENSOR) MISC Use as directed for continuous glucose monitoring  Change every 10 days, Normal      Continuous Blood Gluc Transmit (DEXCOM G6 TRANSMITTER) MISC Use as directed for continuous glucose monitoring-Change every 3 months, Normal      doxercalciferol (HECTOROL) 0 5 mcg capsule 4 mcg, Starting Wed 11/10/2021, Until Wed 11/9/2022 at 2359, Historical Med      Incontinence Supplies (MALE URINAL) MISC by Does not apply route daily, Starting Mon 9/24/2018, Print      insulin lispro (HumaLOG KwikPen) 100 units/mL injection pen INJECT 4 UNITS 3 TIMES A DAY WITH MEALS PLUS SCALE (max daily dose 42 units), Normal      Insulin Syringe-Needle U-100 (B-D INS SYRINGE 0 5CC/30GX1/2") 30G X 1/2" 0 5 ML MISC Inject under the skin 4 (four) times a day, Starting Fri 6/5/2020, Normal      !!  Lancets Ultra Thin 30G MISC Use 3 times a day, Normal      Lantus 100 UNIT/ML subcutaneous injection INJECT 14 UNITS UNDER THE SKIN DAILY, Starting Wed 4/13/2022, Normal      meclizine (ANTIVERT) 25 mg tablet Take 1 tablet (25 mg total) by mouth every 8 (eight) hours as needed for dizziness or nausea, Starting Thu 4/7/2022, Normal      Nutritional Supplements (VITAMIN D BOOSTER PO) Take 0 5 mcg by mouth  , Starting Mon 6/21/2021, Until Mon 6/20/2022 at 2359, Historical Med      !! ONETOUCH DELICA LANCETS 17K MISC by Does not apply route 3 (three) times a day, Starting Tue 11/27/2018, Normal      sertraline (Zoloft) 25 mg tablet Take 1/2 tablet every day at bedtime for 4 days, then increase to 1 tablet every day at bedtime, Normal      Sevelamer Carbonate 2 4 g PACK Take 1 packet by mouth Three times a day, Starting Wed 5/4/2022, Historical Med      TORSEMIDE PO Take 50 mg by mouth Pt takes 50 mg daily on non- dialysis days: sat, sun, Tues, and thrusday  Pt takes 10 mg of torsemide daily on dialysis days m- w- f  , Starting Mon 10/25/2021, Historical Med      True Metrix Blood Glucose Test test strip USE 1 EACH 3 (THREE) TIMES A DAY USE AS INSTRUCTED, Starting Thu 5/12/2022, Normal       !! - Potential duplicate medications found  Please discuss with provider  No discharge procedures on file  PDMP Review       Value Time User    PDMP Reviewed  Yes 12/30/2020 10:40 PM Ernie Chen MD           ED Provider  Attending physically available and evaluated Donovan Baker I managed the patient along with the ED Attending      Electronically Signed by         Rudy Estrada MD  05/25/22 2059

## 2022-05-24 NOTE — DISCHARGE INSTRUCTIONS
Please follow-up with your primary care provider as soon as possible for continued evaluation of your symptoms  Results of your COVID screen will be transmitted to you via my chart and you will be called with the results once the information is become available to staff in the emergency department  Please return to the emergency department if you experience worsening of symptoms that include but are not limited to: Loss of consciousness, fevers, chills, increasing abdominal pain, inability to catch her breath, chest pains, changes in urination, changes in her bowel movements, new skin findings, or numbness/paresthesias

## 2022-05-25 ENCOUNTER — TELEPHONE (OUTPATIENT)
Dept: PSYCHIATRY | Facility: CLINIC | Age: 62
End: 2022-05-25

## 2022-05-25 NOTE — TELEPHONE ENCOUNTER
LVM to schedule his loop implant  Wanted to see if he wants to go to [facility] to get it done

## 2022-05-25 NOTE — ED ATTENDING ATTESTATION
5/24/2022  Kristen CUNHA DO, saw and evaluated the patient  I have discussed the patient with the resident/non-physician practitioner and agree with the resident's/non-physician practitioner's findings, Plan of Care, and MDM as documented in the resident's/non-physician practitioner's note, except where noted  All available labs and Radiology studies were reviewed  I was present for key portions of any procedure(s) performed by the resident/non-physician practitioner and I was immediately available to provide assistance  At this point I agree with the current assessment done in the Emergency Department  I have conducted an independent evaluation of this patient a history and physical is as follows:    70-year-old male coming in for evaluation of cough and requesting COVID-19 test over to be seen at dialysis  He was here yesterday for upper abdominal pain and vomiting with cough both not tested for COVID-19 and is thus here for that test   Otherwise he feels okay and has no new symptoms  On physical exam: pt very well appearing, in NAD  mucous membranes moist   CTA b/l , heart RRR  Abdomen NT, ND, no R/R/G  Normal bowel sounds  Neuro intact, gcs 15  Cap refill < 2 sec, skin warm and dry  ED Course     COVID-19 test sent patient discharged home  Resulted as negative and patient was informed of results        Critical Care Time  Procedures

## 2022-05-25 NOTE — TELEPHONE ENCOUNTER
Called patient regarding referral in system  Patient's wife answered the phone and stated that her  could be reached after 3PM and they would give the office a call later

## 2022-05-26 ENCOUNTER — TELEPHONE (OUTPATIENT)
Dept: NEUROLOGY | Facility: CLINIC | Age: 62
End: 2022-05-26

## 2022-05-26 ENCOUNTER — OFFICE VISIT (OUTPATIENT)
Dept: INTERNAL MEDICINE CLINIC | Facility: CLINIC | Age: 62
End: 2022-05-26
Payer: MEDICARE

## 2022-05-26 ENCOUNTER — TELEPHONE (OUTPATIENT)
Dept: FAMILY MEDICINE CLINIC | Facility: CLINIC | Age: 62
End: 2022-05-26

## 2022-05-26 VITALS
BODY MASS INDEX: 34.96 KG/M2 | WEIGHT: 236 LBS | SYSTOLIC BLOOD PRESSURE: 122 MMHG | HEIGHT: 69 IN | TEMPERATURE: 99 F | HEART RATE: 94 BPM | OXYGEN SATURATION: 98 % | RESPIRATION RATE: 16 BRPM | DIASTOLIC BLOOD PRESSURE: 82 MMHG

## 2022-05-26 DIAGNOSIS — J01.90 ACUTE NON-RECURRENT SINUSITIS, UNSPECIFIED LOCATION: Primary | ICD-10-CM

## 2022-05-26 DIAGNOSIS — Z00.00 MEDICARE ANNUAL WELLNESS VISIT, SUBSEQUENT: ICD-10-CM

## 2022-05-26 PROCEDURE — G0438 PPPS, INITIAL VISIT: HCPCS | Performed by: INTERNAL MEDICINE

## 2022-05-26 PROCEDURE — 99213 OFFICE O/P EST LOW 20 MIN: CPT | Performed by: INTERNAL MEDICINE

## 2022-05-26 RX ORDER — AMOXICILLIN 400 MG/5ML
500 POWDER, FOR SUSPENSION ORAL DAILY
Qty: 44.1 ML | Refills: 0 | Status: SHIPPED | OUTPATIENT
Start: 2022-05-26 | End: 2022-06-02

## 2022-05-26 RX ORDER — CEFUROXIME AXETIL 500 MG/1
500 TABLET ORAL EVERY 12 HOURS SCHEDULED
Qty: 20 TABLET | Refills: 0 | Status: CANCELLED | OUTPATIENT
Start: 2022-05-26 | End: 2022-06-05

## 2022-05-26 RX ORDER — DOXYCYCLINE HYCLATE 100 MG/1
100 CAPSULE ORAL EVERY 12 HOURS SCHEDULED
Qty: 14 CAPSULE | Refills: 0 | Status: SHIPPED | OUTPATIENT
Start: 2022-05-26 | End: 2022-05-26

## 2022-05-26 NOTE — TELEPHONE ENCOUNTER
Called the patient to review his chart and set up for his virtual visit with Dr Marie Post  LMOM to contact us back to get set up and checked in  Provided my direct number to call back

## 2022-05-26 NOTE — PROGRESS NOTES
Assessment and Plan:     Problem List Items Addressed This Visit    None     Visit Diagnoses     Acute non-recurrent sinusitis, unspecified location    -  Primary    wife requests suspension/not capsule/tablet  will treat with amoxicillin and flonase  pt to contact nephrology to see how he can take amoxicillin with in HD    Relevant Medications    amoxicillin (AMOXIL) 400 MG/5ML suspension    Medicare annual wellness visit, subsequent            BMI Counseling: Body mass index is 34 85 kg/m²  The BMI is above normal  Nutrition recommendations include moderation in carbohydrate intake  Rationale for BMI follow-up plan is due to patient being overweight or obese  Preventive health issues were discussed with patient, and age appropriate screening tests were ordered as noted in patient's After Visit Summary  Personalized health advice and appropriate referrals for health education or preventive services given if needed, as noted in patient's After Visit Summary       History of Present Illness:     Patient presents for Medicare Annual Wellness visit    Patient Care Team:  Gary Case DO as PCP - Rhiannon James MD as PCP - Endocrinology (Endocrinology)  MD Ingrid Mcintosh PA-C as Physician Assistant (Endocrinology)  Deloris Hodgkins, DPM (Podiatry)     Problem List:     Patient Active Problem List   Diagnosis    Type 2 diabetes mellitus with chronic kidney disease on chronic dialysis, with long-term current use of insulin ([address] )    Dizziness    Mixed hyperlipidemia    Nephrotic range proteinuria    Cognitive impairment    Elevated alkaline phosphatase level    Diabetic macular edema ([address] )    GERD (gastroesophageal reflux disease)    Diabetic polyneuropathy associated with type 2 diabetes mellitus ([address] )    Other constipation    Abnormal EEG    History of stroke    symptomatic hypoglycemia    Anxiety associated with depression    Urge incontinence    Overactive bladder    S/P arteriovenous (AV) fistula creation    Pre-kidney transplant, listed    Acute kidney injury superimposed on chronic kidney disease [facility]    Anemia    History of 2019 novel coronavirus disease (COVID-19)    [facility]    Pancytopenia ([facility]    Enteritis    Leukocytosis    Penetrating foot wound, left, initial encounter    Emotional lability    Elevated troponin    Hypertension    Right sided pain and paresthesias    Chest pain    Electrolyte abnormality      Past Medical and Surgical History:     Past Medical History:   Diagnosis Date    Cerebrovascular accident (CVA) due to thrombosis of left middle cerebral artery ([facility] ) 7/29/2018    Chronic kidney disease     Diabetes mellitus ([facility]     GERD (gastroesophageal reflux disease)     Hypercholesteremia     Hyperlipidemia     Hypertension     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    Neuropathy     Obesity     Osteomyelitis ([facility] )     last assessed 11/4/16    PVC's (premature ventricular contractions)     sees cardiology Dr Semaj camargo    Stroke [facility])     last weeof July 2018 [facility]    TIA (transient ischemic attack) 10/28/2018     Past Surgical History:   Procedure Laterality Date    ABDOMINAL SURGERY      CARDIAC CATHETERIZATION N/A 5/2/2022    Procedure: Cardiac Coronary Angiogram;  Surgeon: Tricia Hernandez MD;  [facility]; Service: Cardiology    CARDIAC CATHETERIZATION N/A 5/2/2022    Procedure: Cardiac pci;  Surgeon: Tricia Hernandez MD;  [facility]; Service: Cardiology    CHOLECYSTECTOMY      Percutaneous    COLONOSCOPY      CYSTOSCOPY      OTHER SURGICAL HISTORY      "stimulator to control bowel movements"    OK ESOPHAGOGASTRODUODENOSCOPY TRANSORAL DIAGNOSTIC N/A 9/27/2016    Procedure: ESOPHAGOGASTRODUODENOSCOPY (EGD); Surgeon: Abhay Hector MD;  Location: [facility] GI LAB;   Service: Gastroenterology    OK LAP,CHOLECYSTECTOMY N/A 2/29/2016    Procedure: LAPAROSCOPIC CHOLECYSTECTOMY ;  Surgeon: Christine Cao DO;  Location: [facility] Main OR;  Service: General    ROTATOR CUFF REPAIR Right     TOE AMPUTATION Right 10/28/2016    Procedure: 3RD TOE AMPUTATION ;  Surgeon: Rajat Mandujano DPM;  Location[facility];  Service:       Family History:     Family History   Problem Relation Age of Onset    Leukemia Mother     Liver disease Mother     Lung cancer Mother         heavy smoker - 3 ppd    Heart disease Father     Liver disease Father     Multiple myeloma Sister     Breast cancer Sister     Urolithiasis Family     Alcohol abuse Neg Hx     Depression Neg Hx     Drug abuse Neg Hx     Substance Abuse Neg Hx     Mental illness Neg Hx       Social History:     Social History     Socioeconomic History    Marital status: /Civil Union     Spouse name: None    Number of children: None    Years of education: None    Highest education level: Not asked   Occupational History    Occupation: disabled   Tobacco Use    Smoking status: Never Smoker    Smokeless tobacco: Never Used   Vaping Use    Vaping Use: Never used   Substance and Sexual Activity    Alcohol use: Not Currently    Drug use: No    Sexual activity: Not Currently   Other Topics Concern    None   Social History Narrative    Daily caffeine consumption 2-3 servings a day     Social Determinants of Health     Financial Resource Strain: Not on file   Food Insecurity: Not on file   Transportation Needs: No Transportation Needs    Lack of Transportation (Medical): No    Lack of Transportation (Non-Medical):  No   Physical Activity: Not on file   Stress: Not on file   Social Connections: Not on file   Intimate Partner Violence: Not on file   Housing Stability: Not on file      Medications and Allergies:     Current Outpatient Medications   Medication Sig Dispense Refill    amoxicillin (AMOXIL) 400 MG/5ML suspension Take 6 3 mL (500 mg total) by mouth daily for 7 days As directed by your nephrologist 44 1 mL 0    aluminum-magnesium hydroxide-simethicone (MYLANTA) 200-200-20 mg/5 mL suspension Take 30 mL by mouth every 4 (four) hours as needed for indigestion or heartburn 355 mL 0    aspirin (ECOTRIN LOW STRENGTH) 81 mg EC tablet Take 81 mg by mouth daily Resume on 8/14        atorvastatin (LIPITOR) 40 mg tablet Take 1 tablet (40 mg total) by mouth daily with dinner 90 tablet 1    BD Pen Needle Adina U/F 32G X 4 MM MISC USE TO INJECT INSULIN 4 TIMES DAILY 400 each 1    Blood Glucose Monitoring Suppl (True Metrix Meter) w/Device KIT Use to test blood sugars 3 times daily 1 kit 0    Blood Pressure Monitoring (BLOOD PRESSURE CUFF) MISC Use to check blood pressure before taking blood pressure medication and 1 hour after and follow instructions provided in discharge instructions based on the readings  1 each 0    calcium acetate (CALPHRON) 667 mg TAKE 2 TABLETS BY MOUTH THREE TIMES DAILY WITH MEALS      calcium carbonate (TUMS) 500 mg chewable tablet Chew 1 tablet (500 mg total) daily as needed for indigestion or heartburn  0    carvedilol (COREG) 12 5 mg tablet TAKE 1 TABLET BY MOUTH TWICE A DAY WITH FOOD 180 tablet 1    Cholecalciferol (Vitamin D3) 1 25 MG (41830 UT) CAPS TAKE 1 CAPSULE BY MOUTH ONE TIME PER WEEK 12 capsule 2    clopidogrel (PLAVIX) 75 mg tablet Take 1 tablet (75 mg total) by mouth in the morning  90 tablet 2    clopidogrel (Plavix) 75 mg tablet Take 1 tablet (75 mg total) by mouth in the morning  90 tablet 3    Continuous Blood Gluc  (DEXCOM G6 ) LANCE Use as directed for continuous glucose monitoring 1 Device 0    Continuous Blood Gluc Sensor (DEXCOM G6 SENSOR) MISC Use as directed for continuous glucose monitoring   Change every 10 days 1 each 11    Continuous Blood Gluc Transmit (DEXCOM G6 TRANSMITTER) MISC Use as directed for continuous glucose monitoring-Change every 3 months 1 each 3    doxercalciferol (HECTOROL) 0 5 mcg capsule 4 mcg (Patient not taking: No sig reported)      Incontinence Supplies (MALE URINAL) MISC by Does not apply route daily (Patient not taking: No sig reported) 6 each 3    insulin lispro (HumaLOG KwikPen) 100 units/mL injection pen INJECT 4 UNITS 3 TIMES A DAY WITH MEALS PLUS SCALE (max daily dose 42 units) 45 mL 1    Insulin Syringe-Needle U-100 (B-D INS SYRINGE 0 5CC/30GX1/2") 30G X 1/2" 0 5 ML MISC Inject under the skin 4 (four) times a day 360 each 1    Lancets Ultra Thin 30G MISC Use 3 times a day 300 each 0    Lantus 100 UNIT/ML subcutaneous injection INJECT 14 UNITS UNDER THE SKIN DAILY 10 mL 0    meclizine (ANTIVERT) 25 mg tablet Take 1 tablet (25 mg total) by mouth every 8 (eight) hours as needed for dizziness or nausea 30 tablet 0    Nutritional Supplements (VITAMIN D BOOSTER PO) Take 0 5 mcg by mouth        ONETOUCH DELICA LANCETS 61L MISC by Does not apply route 3 (three) times a day 270 each 1    sertraline (Zoloft) 25 mg tablet Take 1/2 tablet every day at bedtime for 4 days, then increase to 1 tablet every day at bedtime 30 tablet 1    Sevelamer Carbonate 2 4 g PACK Take 1 packet by mouth Three times a day      TORSEMIDE PO Take 50 mg by mouth Pt takes 50 mg daily on non- dialysis days: sat, sun, Tues, and thrusday  Pt takes 10 mg of torsemide daily on dialysis days m- w- f        True Metrix Blood Glucose Test test strip USE 1 EACH 3 (THREE) TIMES A DAY USE AS INSTRUCTED 100 strip 5     No current facility-administered medications for this visit       No Known Allergies   Immunizations:     Immunization History   Administered Date(s) Administered    COVID-19 MODERNA VACC 0 5 ML IM 11/08/2021    COVID-19 PFIZER VACCINE 0 3 ML IM 03/23/2021, 04/21/2021    Hep B, adult 05/19/2021, 06/16/2021, 07/21/2021    Hepatitis B Vaccine (Recombinant), Cpg Adjuvanted 06/16/2021, 07/21/2021, 11/26/2021    Influenza Quadrivalent Preservative Free 3 years and older IM 10/04/2021    Influenza, recombinant, quadrivalent,injectable, preservative free 11/02/2021    Pneumococcal Polysaccharide PPV23 09/21/2018    Tdap 10/24/2016, 01/30/2022      Health Maintenance:         Topic Date Due    Colorectal Cancer Screening  07/19/2029    HIV Screening  Completed    Hepatitis C Screening  Completed         Topic Date Due    Pneumococcal Vaccine: Pediatrics (0 to 5 Years) and At-Risk Patients (6 to 59 Years) (2 - PCV) 09/21/2019    COVID-19 Vaccine (4 - Booster) 02/08/2022      Medicare Health Risk Assessment:     /82   Pulse 94   Temp 99 °F (37 2 °C)   Resp 16   Ht 5' 9" (1 753 m)   Wt 107 kg (236 lb)   SpO2 98%   BMI 34 85 kg/m²      Lyric Hartman is here for his Subsequent Wellness visit  Health Risk Assessment:   Patient rates overall health as poor  Patient feels that their physical health rating is much better  Patient is satisfied with their life  Patient feels that their emotional and mental health rating is slightly worse  Patients states they are sometimes angry  Patient states they are often unusually tired/fatigued  Pain experienced in the last 7 days has been a lot  Patient's pain rating has been 7/10  Patient states that he has experienced no weight loss or gain in last 6 months  Fall Risk Screening: In the past year, patient has experienced: no history of falling in past year      Home Safety:  Patient does not have trouble with stairs inside or outside of their home  Patient has working smoke alarms and has working carbon monoxide detector  Home safety hazards include: loose rugs on the floor  Nutrition:   Current diet is Diabetic  Medications:   Patient is not currently taking any over-the-counter supplements  Patient is able to manage medications  Activities of Daily Living (ADLs)/Instrumental Activities of Daily Living (IADLs):   Walk and transfer into and out of bed and chair?: Yes  Dress and groom yourself?: Yes    Bathe or shower yourself?: Yes    Feed yourself?  Yes  Do your laundry/housekeeping?: Yes  Manage your money, pay your bills and track your expenses?: Yes  Make your own meals?: Yes    Do your own shopping?: Yes    Previous Hospitalizations:   Any hospitalizations or ED visits within the last 12 months?: Yes    How many hospitalizations have you had in the last year?: 3-4    Advance Care Planning:     Advanced directive: Yes      PREVENTIVE SCREENINGS      Cardiovascular Screening:    General: Screening Not Indicated and History Lipid Disorder      Diabetes Screening:     General: Screening Not Indicated and History Diabetes      Colorectal Cancer Screening:     General: Screening Current      Prostate Cancer Screening:      Due for: PSA      Osteoporosis Screening:    General: Screening Not Indicated      Abdominal Aortic Aneurysm (AAA) Screening:        General: Screening Not Indicated      Lung Cancer Screening:     General: Screening Not Indicated      Hepatitis C Screening:    General: Screening Current    Screening, Brief Intervention, and Referral to Treatment (SBIRT)    Screening  Typical number of drinks in a day: 0  Typical number of drinks in a week: 0  Interpretation: Low risk drinking behavior      Single Item Drug Screening:  How often have you used an illegal drug (including marijuana) or a prescription medication for non-medical reasons in the past year? never    Single Item Drug Screen Score: 0  Interpretation: Negative screen for possible drug use disorder    Review of Current Opioid Use    Opioid Risk Tool (ORT) Interpretation: Complete Opioid Risk Tool (ORT)      Raj Auguste DO

## 2022-05-26 NOTE — PROGRESS NOTES
Assessment/Plan:     Diagnoses and all orders for this visit:    Acute non-recurrent sinusitis, unspecified location  Comments:  wife requests suspension/not capsule/tablet  will treat with amoxicillin and flonase  pt to contact nephrology to see how he can take amoxicillin with in HD  Orders:  -     Discontinue: doxycycline hyclate (VIBRAMYCIN) 100 mg capsule; Take 1 capsule (100 mg total) by mouth every 12 (twelve) hours for 7 days  -     amoxicillin (AMOXIL) 400 MG/5ML suspension; Take 6 3 mL (500 mg total) by mouth daily for 7 days As directed by your nephrologist    Medicare annual wellness visit, subsequent       Suspect a combination of sinus infection and allergies  C/w plan as above and t/c 2nd gen  if okay with nephrology  Rec'd a phone call from pt's nephrologist office: Maria L Ludwig PA-C  D/w Sunny Espinoza the amoxicillin dose and possible options to treat allergies such as claritin or zyrtec  He stated the amoxicillin dose of 500mg per day is fine as is either claritin or zyrtec and he will reach out to the pt's family today to advise  Subjective:      Patient ID: Mati Arroyo is a 58 y o  male  HPI    Here with c/o coughing/congestion/runny nose and feeling unwell for the last 4 days  No fever or SOB  Went to ER 2 days ago and tested neg for flu/COVID/RSV  Julisa Priest is ESRD on HD(M/W/F)  No known sick contacts  Wife is present during the appt and provides some of the history  He has been coughing so much that he vomits up mucus  He had Tdap injection Jan 2022  His son is graduating from college tonight and he wants to go  ROS otherwise negative, no other complaints      Past Medical History:   Diagnosis Date    Cerebrovascular accident (CVA) due to thrombosis of left middle cerebral artery ([facility] ) 7/29/2018    Chronic kidney disease     Diabetes mellitus ([facility]     GERD (gastroesophageal reflux disease)     Hypercholesteremia     Hyperlipidemia     Hypertension     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    Neuropathy     Obesity     Osteomyelitis ([address] )     last assessed 11/4/16    PVC's (premature ventricular contractions)     sees cardiology Dr Shaina camargo    Stroke [facility])     last weeof July 2018 [address] [facility]    TIA (transient ischemic attack) 10/28/2018     Vitals:    05/26/22 1430   BP: 122/82   Pulse: 94   Resp: 16   Temp: 99 °F (37 2 °C)   SpO2: 98%   Weight: 107 kg (236 lb)   Height: 5' 9" (1 753 m)     Body mass index is 34 85 kg/m²  Current Outpatient Medications:     amoxicillin (AMOXIL) 400 MG/5ML suspension, Take 6 3 mL (500 mg total) by mouth daily for 7 days As directed by your nephrologist, Disp: 44 1 mL, Rfl: 0    aluminum-magnesium hydroxide-simethicone (MYLANTA) 200-200-20 mg/5 mL suspension, Take 30 mL by mouth every 4 (four) hours as needed for indigestion or heartburn, Disp: 355 mL, Rfl: 0    aspirin (ECOTRIN LOW STRENGTH) 81 mg EC tablet, Take 81 mg by mouth daily Resume on 8/14  , Disp: , Rfl:     atorvastatin (LIPITOR) 40 mg tablet, Take 1 tablet (40 mg total) by mouth daily with dinner, Disp: 90 tablet, Rfl: 1    BD Pen Needle Adina U/F 32G X 4 MM MISC, USE TO INJECT INSULIN 4 TIMES DAILY, Disp: 400 each, Rfl: 1    Blood Glucose Monitoring Suppl (True Metrix Meter) w/Device KIT, Use to test blood sugars 3 times daily, Disp: 1 kit, Rfl: 0    Blood Pressure Monitoring (BLOOD PRESSURE CUFF) MISC, Use to check blood pressure before taking blood pressure medication and 1 hour after and follow instructions provided in discharge instructions based on the readings  , Disp: 1 each, Rfl: 0    calcium acetate (CALPHRON) 667 mg, TAKE 2 TABLETS BY MOUTH THREE TIMES DAILY WITH MEALS, Disp: , Rfl:     calcium carbonate (TUMS) 500 mg chewable tablet, Chew 1 tablet (500 mg total) daily as needed for indigestion or heartburn, Disp: , Rfl: 0    carvedilol (COREG) 12 5 mg tablet, TAKE 1 TABLET BY MOUTH TWICE A DAY WITH FOOD, Disp: 180 tablet, Rfl: 1   Cholecalciferol (Vitamin D3) 1 25 MG (28691 UT) CAPS, TAKE 1 CAPSULE BY MOUTH ONE TIME PER WEEK, Disp: 12 capsule, Rfl: 2    clopidogrel (PLAVIX) 75 mg tablet, Take 1 tablet (75 mg total) by mouth in the morning , Disp: 90 tablet, Rfl: 2    clopidogrel (Plavix) 75 mg tablet, Take 1 tablet (75 mg total) by mouth in the morning , Disp: 90 tablet, Rfl: 3    Continuous Blood Gluc  (DEXCOM G6 ) LANCE, Use as directed for continuous glucose monitoring, Disp: 1 Device, Rfl: 0    Continuous Blood Gluc Sensor (DEXCOM G6 SENSOR) MISC, Use as directed for continuous glucose monitoring   Change every 10 days, Disp: 1 each, Rfl: 11    Continuous Blood Gluc Transmit (DEXCOM G6 TRANSMITTER) MISC, Use as directed for continuous glucose monitoring-Change every 3 months, Disp: 1 each, Rfl: 3    doxercalciferol (HECTOROL) 0 5 mcg capsule, 4 mcg (Patient not taking: No sig reported), Disp: , Rfl:     Incontinence Supplies (MALE URINAL) MISC, by Does not apply route daily (Patient not taking: No sig reported), Disp: 6 each, Rfl: 3    insulin lispro (HumaLOG KwikPen) 100 units/mL injection pen, INJECT 4 UNITS 3 TIMES A DAY WITH MEALS PLUS SCALE (max daily dose 42 units), Disp: 45 mL, Rfl: 1    Insulin Syringe-Needle U-100 (B-D INS SYRINGE 0 5CC/30GX1/2") 30G X 1/2" 0 5 ML MISC, Inject under the skin 4 (four) times a day, Disp: 360 each, Rfl: 1    Lancets Ultra Thin 30G MISC, Use 3 times a day, Disp: 300 each, Rfl: 0    Lantus 100 UNIT/ML subcutaneous injection, INJECT 14 UNITS UNDER THE SKIN DAILY, Disp: 10 mL, Rfl: 0    meclizine (ANTIVERT) 25 mg tablet, Take 1 tablet (25 mg total) by mouth every 8 (eight) hours as needed for dizziness or nausea, Disp: 30 tablet, Rfl: 0    Nutritional Supplements (VITAMIN D BOOSTER PO), Take 0 5 mcg by mouth  , Disp: , Rfl:     ONETOUCH DELICA LANCETS 11X MISC, by Does not apply route 3 (three) times a day, Disp: 270 each, Rfl: 1    sertraline (Zoloft) 25 mg tablet, Take 1/2 tablet every day at bedtime for 4 days, then increase to 1 tablet every day at bedtime, Disp: 30 tablet, Rfl: 1    Sevelamer Carbonate 2 4 g PACK, Take 1 packet by mouth Three times a day, Disp: , Rfl:     TORSEMIDE PO, Take 50 mg by mouth Pt takes 50 mg daily on non- dialysis days: sat, sun, Tues, and thrusday  Pt takes 10 mg of torsemide daily on dialysis days m- w- f  , Disp: , Rfl:     True Metrix Blood Glucose Test test strip, USE 1 EACH 3 (THREE) TIMES A DAY USE AS INSTRUCTED, Disp: 100 strip, Rfl: 5  No Known Allergies      Review of Systems   Constitutional: Positive for fatigue  Negative for fever  HENT: Positive for congestion, postnasal drip and rhinorrhea  Negative for sore throat  Respiratory: Positive for cough  Negative for shortness of breath  Cardiovascular: Negative for chest pain  Gastrointestinal: Negative for abdominal pain and diarrhea  Allergic/Immunologic: Positive for environmental allergies  Objective:      /82   Pulse 94   Temp 99 °F (37 2 °C)   Resp 16   Ht 5' 9" (1 753 m)   Wt 107 kg (236 lb)   SpO2 98%   BMI 34 85 kg/m²          Physical Exam  Vitals reviewed  Constitutional:       General: He is not in acute distress  Appearance: Normal appearance  He is ill-appearing  Comments: Coughing occ during the appt   HENT:      Head: Normocephalic and atraumatic  Right Ear: Tympanic membrane normal       Left Ear: Tympanic membrane normal       Nose: Congestion and rhinorrhea present  Mouth/Throat:      Pharynx: No oropharyngeal exudate  Eyes:      Conjunctiva/sclera: Conjunctivae normal    Cardiovascular:      Rate and Rhythm: Normal rate and regular rhythm  Heart sounds: No murmur heard  Pulmonary:      Effort: Pulmonary effort is normal       Breath sounds: No wheezing or rales  Abdominal:      General: Bowel sounds are normal       Palpations: Abdomen is soft  Tenderness: There is no abdominal tenderness  Musculoskeletal:      Right lower leg: No edema  Left lower leg: No edema  Neurological:      Mental Status: He is alert  Psychiatric:         Mood and Affect: Mood is anxious           Behavior: Behavior normal

## 2022-05-26 NOTE — PATIENT INSTRUCTIONS
1  Take antibiotic as directed by nephrologist  2  Flonase nasal spray 1 spray each nostril  3  Ask your kidney doctor if you can take loratadine(claritin) or cetirizine(zyrtec)  Medicare Preventive Visit Patient Instructions  Thank you for completing your Welcome to Medicare Visit or Medicare Annual Wellness Visit today  Your next wellness visit will be due in one year (5/27/2023)  The screening/preventive services that you may require over the next 5-10 years are detailed below  Some tests may not apply to you based off risk factors and/or age  Screening tests ordered at today's visit but not completed yet may show as past due  Also, please note that scanned in results may not display below  Preventive Screenings:  Service Recommendations Previous Testing/Comments   Colorectal Cancer Screening  Colonoscopy    Fecal Occult Blood Test (FOBT)/Fecal Immunochemical Test (FIT)  Fecal DNA/Cologuard Test  Flexible Sigmoidoscopy Age: 54-65 years old   Colonoscopy: every 10 years (May be performed more frequently if at higher risk)  OR  FOBT/FIT: every 1 year  OR  Cologuard: every 3 years  OR  Sigmoidoscopy: every 5 years  Screening may be recommended earlier than age 48 if at higher risk for colorectal cancer  Also, an individualized decision between you and your healthcare provider will decide whether screening between the ages of 74-80 would be appropriate   Colonoscopy: 07/19/2019  FOBT/FIT: Not on file  Cologuard: Not on file  Sigmoidoscopy: Not on file    Screening Current     Prostate Cancer Screening Individualized decision between patient and health care provider in men between ages of 53-78   Medicare will cover every 12 months beginning on the day after your 50th birthday PSA: No results in last 5 years           Hepatitis C Screening Once for adults born between [facility]  More frequently in patients at high risk for Hepatitis C Hep C Antibody: 10/06/2020    Screening Current   Diabetes Screening 1-2 times per year if you're at risk for diabetes or have pre-diabetes Fasting glucose: 178 mg/dL   A1C: 6 0 %    Screening Not Indicated  History Diabetes   Cholesterol Screening Once every 5 years if you don't have a lipid disorder  May order more often based on risk factors  Lipid panel: 04/30/2022    Screening Not Indicated  History Lipid Disorder      Other Preventive Screenings Covered by Medicare:  Abdominal Aortic Aneurysm (AAA) Screening: covered once if your at risk  You're considered to be at risk if you have a family history of AAA or a male between the age of 73-68 who smoking at least 100 cigarettes in your lifetime  Lung Cancer Screening: covers low dose CT scan once per year if you meet all of the following conditions: (1) Age 50-69; (2) No signs or symptoms of lung cancer; (3) Current smoker or have quit smoking within the last 15 years; (4) You have a tobacco smoking history of at least 30 pack years (packs per day x number of years you smoked); (5) You get a written order from a healthcare provider  Glaucoma Screening: covered annually if you're considered high risk: (1) You have diabetes OR (2) Family history of glaucoma OR (3)  aged 48 and older OR (3)  American aged 72 and older  Osteoporosis Screening: covered every 2 years if you meet one of the following conditions: (1) Have a vertebral abnormality; (2) On glucocorticoid therapy for more than 3 months; (3) Have primary hyperparathyroidism; (4) On osteoporosis medications and need to assess response to drug therapy  HIV Screening: covered annually if you're between the age of 12-76  Also covered annually if you are younger than 13 and older than 72 with risk factors for HIV infection  For pregnant patients, it is covered up to 3 times per pregnancy      Immunizations:  Immunization Recommendations   Influenza Vaccine Annual influenza vaccination during flu season is recommended for all persons aged >= 6 months who do not have contraindications   Pneumococcal Vaccine (Prevnar and Pneumovax)  * Prevnar = PCV13  * Pneumovax = PPSV23 Adults 25-60 years old: 1-3 doses may be recommended based on certain risk factors  Adults 72 years old: Prevnar (PCV13) vaccine recommended followed by Pneumovax (PPSV23) vaccine  If already received PPSV23 since turning 65, then PCV13 recommended at least one year after PPSV23 dose  Hepatitis B Vaccine 3 dose series if at intermediate or high risk (ex: diabetes, end stage renal disease, liver disease)   Tetanus (Td) Vaccine - COST NOT COVERED BY MEDICARE PART B Following completion of primary series, a booster dose should be given every 10 years to maintain immunity against tetanus  Td may also be given as tetanus wound prophylaxis  Tdap Vaccine - COST NOT COVERED BY MEDICARE PART B Recommended at least once for all adults  For pregnant patients, recommended with each pregnancy  Shingles Vaccine (Shingrix) - COST NOT COVERED BY MEDICARE PART B  2 shot series recommended in those aged 48 and above     Health Maintenance Due:      Topic Date Due    Colorectal Cancer Screening  07/19/2029    HIV Screening  Completed    Hepatitis C Screening  Completed     Immunizations Due:      Topic Date Due    Pneumococcal Vaccine: Pediatrics (0 to 5 Years) and At-Risk Patients (6 to 59 Years) (2 - PCV) 09/21/2019    COVID-19 Vaccine (4 - Booster) 02/08/2022     Advance Directives   What are advance directives? Advance directives are legal documents that state your wishes and plans for medical care  These plans are made ahead of time in case you lose your ability to make decisions for yourself  Advance directives can apply to any medical decision, such as the treatments you want, and if you want to donate organs  What are the types of advance directives? There are many types of advance directives, and each state has rules about how to use them   You may choose a combination of any of the following:  Living will: This is a written record of the treatment you want  You can also choose which treatments you do not want, which to limit, and which to stop at a certain time  This includes surgery, medicine, IV fluid, and tube feedings  Durable power of  for healthcare [facility]): This is a written record that states who you want to make healthcare choices for you when you are unable to make them for yourself  This person, called a proxy, is usually a family member or a friend  You may choose more than 1 proxy  Do not resuscitate (DNR) order:  A DNR order is used in case your heart stops beating or you stop breathing  It is a request not to have certain forms of treatment, such as CPR  A DNR order may be included in other types of advance directives  Medical directive: This covers the care that you want if you are in a coma, near death, or unable to make decisions for yourself  You can list the treatments you want for each condition  Treatment may include pain medicine, surgery, blood transfusions, dialysis, IV or tube feedings, and a ventilator (breathing machine)  Values history: This document has questions about your views, beliefs, and how you feel and think about life  This information can help others choose the care that you would choose  Why are advance directives important? An advance directive helps you control your care  Although spoken wishes may be used, it is better to have your wishes written down  Spoken wishes can be misunderstood, or not followed  Treatments may be given even if you do not want them  An advance directive may make it easier for your family to make difficult choices about your care  Weight Management   Why it is important to manage your weight:  Being overweight increases your risk of health conditions such as heart disease, high blood pressure, type 2 diabetes, and certain types of cancer   It can also increase your risk for osteoarthritis, sleep apnea, and other respiratory problems  Aim for a slow, steady weight loss  Even a small amount of weight loss can lower your risk of health problems  How to lose weight safely:  A safe and healthy way to lose weight is to eat fewer calories and get regular exercise  You can lose up about 1 pound a week by decreasing the number of calories you eat by 500 calories each day  Healthy meal plan for weight management:  A healthy meal plan includes a variety of foods, contains fewer calories, and helps you stay healthy  A healthy meal plan includes the following:  Eat whole-grain foods more often  A healthy meal plan should contain fiber  Fiber is the part of grains, fruits, and vegetables that is not broken down by your body  Whole-grain foods are healthy and provide extra fiber in your diet  Some examples of whole-grain foods are whole-wheat breads and pastas, oatmeal, brown rice, and bulgur  Eat a variety of vegetables every day  Include dark, leafy greens such as spinach, kale, art greens, and mustard greens  Eat yellow and orange vegetables such as carrots, sweet potatoes, and winter squash  Eat a variety of fruits every day  Choose fresh or canned fruit (canned in its own juice or light syrup) instead of juice  Fruit juice has very little or no fiber  Eat low-fat dairy foods  Drink fat-free (skim) milk or 1% milk  Eat fat-free yogurt and low-fat cottage cheese  Try low-fat cheeses such as mozzarella and other reduced-fat cheeses  Choose meat and other protein foods that are low in fat  Choose beans or other legumes such as split peas or lentils  Choose fish, skinless poultry (chicken or turkey), or lean cuts of red meat (beef or pork)  Before you cook meat or poultry, cut off any visible fat  Use less fat and oil  Try baking foods instead of frying them  Add less fat, such as margarine, sour cream, regular salad dressing and mayonnaise to foods  Eat fewer high-fat foods   Some examples of high-fat foods include french fries, doughnuts, ice cream, and cakes  Eat fewer sweets  Limit foods and drinks that are high in sugar  This includes candy, cookies, regular soda, and sweetened drinks  Exercise:  Exercise at least 30 minutes per day on most days of the week  Some examples of exercise include walking, biking, dancing, and swimming  You can also fit in more physical activity by taking the stairs instead of the elevator or parking farther away from stores  Ask your healthcare provider about the best exercise plan for you  Narcotic (Opioid) Safety    Use narcotics safely:  Take prescribed narcotics exactly as directed  Do not give narcotics to others or take narcotics that belong to someone else  Do not mix narcotics without medicines or alcohol  Do not drive or operate heavy machinery after you take the narcotic  Monitor for side effects and notify your healthcare provider if you experienced side effects such as nausea, sleepiness, itching, or trouble thinking clearly  Manage constipation:    Constipation is the most common side effect of narcotic medicine  Constipation is when you have hard, dry bowel movements, or you go longer than usual between bowel movements  Tell your healthcare provider about all changes in your bowel movements while you are taking narcotics  He or she may recommend laxative medicine to help you have a bowel movement  He or she may also change the kind of narcotic you are taking, or change when you take it  The following are more ways you can prevent or relieve constipation:    Drink liquids as directed  You may need to drink extra liquids to help soften and move your bowels  Ask how much liquid to drink each day and which liquids are best for you  Eat high-fiber foods  This may help decrease constipation by adding bulk to your bowel movements  High-fiber foods include fruits, vegetables, whole-grain breads and cereals, and beans   Your healthcare provider or dietitian can help you create a high-fiber meal plan  Your provider may also recommend a fiber supplement if you cannot get enough fiber from food  Exercise regularly  Regular physical activity can help stimulate your intestines  Walking is a good exercise to prevent or relieve constipation  Ask which exercises are best for you  Schedule a time each day to have a bowel movement  This may help train your body to have regular bowel movements  Bend forward while you are on the toilet to help move the bowel movement out  Sit on the toilet for at least 10 minutes, even if you do not have a bowel movement  Store narcotics safely:   Store narcotics where others cannot easily get them  Keep them in a locked cabinet or secure area  Do not  keep them in a purse or other bag you carry with you  A person may be looking for something else and find the narcotics  Make sure narcotics are stored out of the reach of children  A child can easily overdose on narcotics  Narcotics may look like candy to a small child  The best way to dispose of narcotics: The laws vary by country and area  In the [place], the best way is to return the narcotics through a take-back program  This program is offered by the [employer])  The following are options for using the program:  Take the narcotics to a PAYAL collection site  The site is often a law enforcement center  Call your local law enforcement center for scheduled take-back days in your area  You will be given information on where to go if the collection site is in a different location  Take the narcotics to an approved pharmacy or hospital   A pharmacy or hospital may be set up as a collection site  You will need to ask if it is a PAYAL collection site if you were not directed there  A pharmacy or doctor's office may not be able to take back narcotics unless it is a PAYAL site  Use a mail-back system  This means you are given containers to put the narcotics into   You will then mail them in the containers  Use a take-back drop box  This is a place to leave the narcotics at any time  People and animals will not be able to get into the box  Your local law enforcement agency can tell you where to find a drop box in your area  Other ways to manage pain:   Ask your healthcare provider about non-narcotic medicines to control pain  Nonprescription medicines include NSAIDs (such as ibuprofen) and acetaminophen  Prescription medicines include muscle relaxers, antidepressants, and steroids  Pain may be managed without any medicines  Some ways to relieve pain include massage, aromatherapy, or meditation  Physical or occupational therapy may also help  For more information:   Drug Enforcement Administration  [facility]  [address]  Phone: 0- 121 - 876-6986  Web Address: [facility]/drug_disposal/    [address]  Phone: 4- 971 - [phone]  Web Address: http://[employer]/     © Copyright [employer] 2018 Information is for End User's use only and may not be sold, redistributed or otherwise used for commercial purposes   All illustrations and images included in CareNotes® are the copyrighted property of A D A M , Inc  or [address] [place] [address] [facility]

## 2022-06-02 ENCOUNTER — TRANSCRIBE ORDERS (OUTPATIENT)
Dept: LAB | Facility: CLINIC | Age: 62
End: 2022-06-02

## 2022-06-02 ENCOUNTER — APPOINTMENT (OUTPATIENT)
Dept: LAB | Facility: CLINIC | Age: 62
End: 2022-06-02
Payer: MEDICARE

## 2022-06-02 DIAGNOSIS — I35.0 NODULAR CALCIFIC AORTIC VALVE STENOSIS: ICD-10-CM

## 2022-06-02 DIAGNOSIS — I25.119 ATHEROSCLEROSIS OF NATIVE CORONARY ARTERY WITH ANGINA PECTORIS, UNSPECIFIED WHETHER NATIVE OR TRANSPLANTED HEART (HCC): Primary | ICD-10-CM

## 2022-06-02 DIAGNOSIS — I25.119 ATHEROSCLEROSIS OF NATIVE CORONARY ARTERY WITH ANGINA PECTORIS, UNSPECIFIED WHETHER NATIVE OR TRANSPLANTED HEART (HCC): ICD-10-CM

## 2022-06-02 LAB
ALBUMIN SERPL BCP-MCNC: 3.6 G/DL (ref 3.5–5)
ALP SERPL-CCNC: 94 U/L (ref 46–116)
ALT SERPL W P-5'-P-CCNC: 32 U/L (ref 12–78)
ANION GAP SERPL CALCULATED.3IONS-SCNC: 4 MMOL/L (ref 4–13)
APTT PPP: 32 SECONDS (ref 23–37)
AST SERPL W P-5'-P-CCNC: 24 U/L (ref 5–45)
BASOPHILS # BLD AUTO: 0.03 THOUSANDS/ΜL (ref 0–0.1)
BASOPHILS NFR BLD AUTO: 0 % (ref 0–1)
BILIRUB SERPL-MCNC: 0.55 MG/DL (ref 0.2–1)
BUN SERPL-MCNC: 33 MG/DL (ref 5–25)
CALCIUM SERPL-MCNC: 9.6 MG/DL (ref 8.3–10.1)
CHLORIDE SERPL-SCNC: 98 MMOL/L (ref 100–108)
CHOLEST SERPL-MCNC: 80 MG/DL
CO2 SERPL-SCNC: 36 MMOL/L (ref 21–32)
CREAT SERPL-MCNC: 5.95 MG/DL (ref 0.6–1.3)
EOSINOPHIL # BLD AUTO: 0.11 THOUSAND/ΜL (ref 0–0.61)
EOSINOPHIL NFR BLD AUTO: 2 % (ref 0–6)
ERYTHROCYTE [DISTWIDTH] IN BLOOD BY AUTOMATED COUNT: 15.5 % (ref 11.6–15.1)
FIBRINOGEN PPP-MCNC: 481 MG/DL (ref 227–495)
GFR SERPL CREATININE-BSD FRML MDRD: 9 ML/MIN/1.73SQ M
GLUCOSE P FAST SERPL-MCNC: 132 MG/DL (ref 65–99)
HCT VFR BLD AUTO: 35.3 % (ref 36.5–49.3)
HDLC SERPL-MCNC: 27 MG/DL
HGB BLD-MCNC: 11.2 G/DL (ref 12–17)
IMM GRANULOCYTES # BLD AUTO: 0.08 THOUSAND/UL (ref 0–0.2)
IMM GRANULOCYTES NFR BLD AUTO: 1 % (ref 0–2)
INR PPP: 1.05 (ref 0.84–1.19)
LDLC SERPL CALC-MCNC: 26 MG/DL (ref 0–100)
LYMPHOCYTES # BLD AUTO: 1.39 THOUSANDS/ΜL (ref 0.6–4.47)
LYMPHOCYTES NFR BLD AUTO: 21 % (ref 14–44)
MCH RBC QN AUTO: 30.6 PG (ref 26.8–34.3)
MCHC RBC AUTO-ENTMCNC: 31.7 G/DL (ref 31.4–37.4)
MCV RBC AUTO: 96 FL (ref 82–98)
MONOCYTES # BLD AUTO: 0.61 THOUSAND/ΜL (ref 0.17–1.22)
MONOCYTES NFR BLD AUTO: 9 % (ref 4–12)
NEUTROPHILS # BLD AUTO: 4.49 THOUSANDS/ΜL (ref 1.85–7.62)
NEUTS SEG NFR BLD AUTO: 67 % (ref 43–75)
NONHDLC SERPL-MCNC: 53 MG/DL
NRBC BLD AUTO-RTO: 0 /100 WBCS
PLATELET # BLD AUTO: 156 THOUSANDS/UL (ref 149–390)
PLATELET # BLD AUTO: 156 THOUSANDS/UL (ref 149–390)
PMV BLD AUTO: 10.6 FL (ref 8.9–12.7)
PMV BLD AUTO: 10.6 FL (ref 8.9–12.7)
POTASSIUM SERPL-SCNC: 4.4 MMOL/L (ref 3.5–5.3)
PROT SERPL-MCNC: 7.5 G/DL (ref 6.4–8.2)
PROTHROMBIN TIME: 13.3 SECONDS (ref 11.6–14.5)
PROTHROMBIN TIME: 13.4 SECONDS (ref 11.6–14.5)
RBC # BLD AUTO: 3.66 MILLION/UL (ref 3.88–5.62)
SODIUM SERPL-SCNC: 138 MMOL/L (ref 136–145)
THROMBIN TIME: 16.1 SECONDS (ref 14.7–18.4)
TRIGL SERPL-MCNC: 133 MG/DL
WBC # BLD AUTO: 6.71 THOUSAND/UL (ref 4.31–10.16)

## 2022-06-02 PROCEDURE — 85384 FIBRINOGEN ACTIVITY: CPT

## 2022-06-02 PROCEDURE — 85670 THROMBIN TIME PLASMA: CPT

## 2022-06-02 PROCEDURE — 36415 COLL VENOUS BLD VENIPUNCTURE: CPT

## 2022-06-02 PROCEDURE — 85610 PROTHROMBIN TIME: CPT

## 2022-06-02 PROCEDURE — 85611 PROTHROMBIN TEST: CPT

## 2022-06-02 PROCEDURE — 85049 AUTOMATED PLATELET COUNT: CPT

## 2022-06-02 PROCEDURE — 85730 THROMBOPLASTIN TIME PARTIAL: CPT

## 2022-06-02 PROCEDURE — 80053 COMPREHEN METABOLIC PANEL: CPT

## 2022-06-02 PROCEDURE — 80061 LIPID PANEL: CPT

## 2022-06-02 PROCEDURE — 85025 COMPLETE CBC W/AUTO DIFF WBC: CPT

## 2022-06-03 ENCOUNTER — HOSPITAL ENCOUNTER (EMERGENCY)
Facility: HOSPITAL | Age: 62
Discharge: HOME | End: 2022-06-03
Attending: EMERGENCY MEDICINE
Payer: MEDICARE

## 2022-06-03 ENCOUNTER — APPOINTMENT (EMERGENCY)
Dept: RADIOLOGY | Facility: HOSPITAL | Age: 62
End: 2022-06-03
Payer: MEDICARE

## 2022-06-03 VITALS
HEART RATE: 74 BPM | RESPIRATION RATE: 18 BRPM | OXYGEN SATURATION: 96 % | DIASTOLIC BLOOD PRESSURE: 76 MMHG | SYSTOLIC BLOOD PRESSURE: 197 MMHG | TEMPERATURE: 98.4 F

## 2022-06-03 DIAGNOSIS — E55.9 VITAMIN D DEFICIENCY: ICD-10-CM

## 2022-06-03 DIAGNOSIS — N18.6 ESRD ON DIALYSIS (HCC): ICD-10-CM

## 2022-06-03 DIAGNOSIS — R07.9 CHEST PAIN: Primary | ICD-10-CM

## 2022-06-03 DIAGNOSIS — Z99.2 ESRD ON DIALYSIS (HCC): ICD-10-CM

## 2022-06-03 LAB
2HR DELTA HS TROPONIN: -7 NG/L
ALBUMIN SERPL BCP-MCNC: 2.9 G/DL (ref 3.5–5)
ALP SERPL-CCNC: 58 U/L (ref 34–104)
ALT SERPL W P-5'-P-CCNC: 14 U/L (ref 7–52)
ANION GAP SERPL CALCULATED.3IONS-SCNC: 6 MMOL/L (ref 4–13)
AST SERPL W P-5'-P-CCNC: 26 U/L (ref 13–39)
BASOPHILS # BLD AUTO: 0.03 THOUSANDS/ΜL (ref 0–0.1)
BASOPHILS NFR BLD AUTO: 1 % (ref 0–1)
BILIRUB SERPL-MCNC: 0.41 MG/DL (ref 0.2–1)
BNP SERPL-MCNC: 346 PG/ML (ref 0–100)
BUN SERPL-MCNC: 19 MG/DL (ref 5–25)
CALCIUM ALBUM COR SERPL-MCNC: 7.6 MG/DL (ref 8.3–10.1)
CALCIUM SERPL-MCNC: 6.7 MG/DL (ref 8.4–10.2)
CARDIAC TROPONIN I PNL SERPL HS: 33 NG/L
CARDIAC TROPONIN I PNL SERPL HS: 40 NG/L
CHLORIDE SERPL-SCNC: 107 MMOL/L (ref 96–108)
CO2 SERPL-SCNC: 27 MMOL/L (ref 21–32)
CREAT SERPL-MCNC: 3.2 MG/DL (ref 0.6–1.3)
EOSINOPHIL # BLD AUTO: 0.08 THOUSAND/ΜL (ref 0–0.61)
EOSINOPHIL NFR BLD AUTO: 2 % (ref 0–6)
ERYTHROCYTE [DISTWIDTH] IN BLOOD BY AUTOMATED COUNT: 15.6 % (ref 11.6–15.1)
FLUAV RNA RESP QL NAA+PROBE: NEGATIVE
FLUBV RNA RESP QL NAA+PROBE: NEGATIVE
GFR SERPL CREATININE-BSD FRML MDRD: 19 ML/MIN/1.73SQ M
GLUCOSE SERPL-MCNC: 102 MG/DL (ref 65–140)
HCT VFR BLD AUTO: 33.6 % (ref 36.5–49.3)
HGB BLD-MCNC: 10.8 G/DL (ref 12–17)
IMM GRANULOCYTES # BLD AUTO: 0.05 THOUSAND/UL (ref 0–0.2)
IMM GRANULOCYTES NFR BLD AUTO: 1 % (ref 0–2)
LYMPHOCYTES # BLD AUTO: 1.36 THOUSANDS/ΜL (ref 0.6–4.47)
LYMPHOCYTES NFR BLD AUTO: 25 % (ref 14–44)
MCH RBC QN AUTO: 31.1 PG (ref 26.8–34.3)
MCHC RBC AUTO-ENTMCNC: 32.1 G/DL (ref 31.4–37.4)
MCV RBC AUTO: 97 FL (ref 82–98)
MONOCYTES # BLD AUTO: 0.56 THOUSAND/ΜL (ref 0.17–1.22)
MONOCYTES NFR BLD AUTO: 10 % (ref 4–12)
NEUTROPHILS # BLD AUTO: 3.37 THOUSANDS/ΜL (ref 1.85–7.62)
NEUTS SEG NFR BLD AUTO: 61 % (ref 43–75)
NRBC BLD AUTO-RTO: 0 /100 WBCS
PLATELET # BLD AUTO: 149 THOUSANDS/UL (ref 149–390)
PMV BLD AUTO: 10.5 FL (ref 8.9–12.7)
POTASSIUM SERPL-SCNC: 3.6 MMOL/L (ref 3.5–5.3)
PROT SERPL-MCNC: 5.3 G/DL (ref 6.4–8.4)
RBC # BLD AUTO: 3.47 MILLION/UL (ref 3.88–5.62)
RSV RNA RESP QL NAA+PROBE: NEGATIVE
SARS-COV-2 RNA RESP QL NAA+PROBE: NEGATIVE
SODIUM SERPL-SCNC: 140 MMOL/L (ref 135–147)
WBC # BLD AUTO: 5.45 THOUSAND/UL (ref 4.31–10.16)

## 2022-06-03 PROCEDURE — 84484 ASSAY OF TROPONIN QUANT: CPT

## 2022-06-03 PROCEDURE — 99285 EMERGENCY DEPT VISIT HI MDM: CPT | Performed by: EMERGENCY MEDICINE

## 2022-06-03 PROCEDURE — 93005 ELECTROCARDIOGRAM TRACING: CPT

## 2022-06-03 PROCEDURE — 99285 EMERGENCY DEPT VISIT HI MDM: CPT

## 2022-06-03 PROCEDURE — 83880 ASSAY OF NATRIURETIC PEPTIDE: CPT

## 2022-06-03 PROCEDURE — 85025 COMPLETE CBC W/AUTO DIFF WBC: CPT

## 2022-06-03 PROCEDURE — 36415 COLL VENOUS BLD VENIPUNCTURE: CPT

## 2022-06-03 PROCEDURE — 80053 COMPREHEN METABOLIC PANEL: CPT

## 2022-06-03 PROCEDURE — 0241U HB NFCT DS VIR RESP RNA 4 TRGT: CPT

## 2022-06-03 PROCEDURE — 71046 X-RAY EXAM CHEST 2 VIEWS: CPT

## 2022-06-03 RX ORDER — CHOLECALCIFEROL (VITAMIN D3) 1250 MCG
CAPSULE ORAL
Qty: 12 CAPSULE | Refills: 2 | Status: SHIPPED | OUTPATIENT
Start: 2022-06-03

## 2022-06-03 NOTE — ED ATTENDING ATTESTATION
6/3/2022  I, Luc Bruce MD, saw and evaluated the patient  I have discussed the patient with the resident/non-physician practitioner and agree with the resident's/non-physician practitioner's findings, Plan of Care, and MDM as documented in the resident's/non-physician practitioner's note, except where noted  All available labs and Radiology studies were reviewed  I was present for key portions of any procedure(s) performed by the resident/non-physician practitioner and I was immediately available to provide assistance  At this point I agree with the current assessment done in the Emergency Department  I have conducted an independent evaluation of this patient a history and physical is as follows:    S:  Chief Complaint   Patient presents with    Chest Pain     1 hour 40 mins into dialysis pt had episode of 8/10 stabbing midsternal chest pain  Also had similar episode yesterday  Got 324 ASA in EMS     Carlos Mejia is a 58 y o  male who presents with the chief complaint of 25 minutes of sharp stabbing chest pain while receiving dialysis today  The patient's pain went from an 8 or 7 down to a 2 prior to arrival   He had no shortness of breath, nausea, vomiting or diaphoresis  He had a similar short episode of pain yesterday  His pmh is significant for esrd on hd, htn, dyslipidemia, obesity and cva/tia  No known CAD  O:  ED Triage Vitals [06/03/22 1413]   Temperature Pulse Respirations Blood Pressure SpO2   98 8 °F (37 1 °C) 77 18 130/63 96 %      Temp Source Heart Rate Source Patient Position - Orthostatic VS BP Location FiO2 (%)   Oral Monitor Lying Right arm --      Pain Score       --         Physical Exam  Vitals and nursing note reviewed  Constitutional:       General: He is not in acute distress  Appearance: He is well-developed  He is obese  HENT:      Head: Normocephalic and atraumatic  Eyes:      Pupils: Pupils are equal, round, and reactive to light     Neck:      Vascular: No JVD    Cardiovascular:      Rate and Rhythm: Normal rate and regular rhythm  Heart sounds: Murmur heard  Systolic (likely transmitted from dialysis graft) murmur is present  No friction rub  No gallop  Pulmonary:      Effort: Pulmonary effort is normal  No respiratory distress  Breath sounds: Normal breath sounds  No wheezing or rales  Chest:      Chest wall: No tenderness  Musculoskeletal:         General: No tenderness  Normal range of motion  Cervical back: Normal range of motion  Skin:     General: Skin is warm and dry  Neurological:      General: No focal deficit present  Mental Status: He is alert and oriented to person, place, and time  Psychiatric:         Behavior: Behavior normal          Thought Content: Thought content normal          Judgment: Judgment normal        A/P:  Background: 58 y o  male with chest pain    Differential DX includes but is not limited to: acs/mi, pe, pleurisy, dissection, pneumonia, musculoskeletal chest pain    Plan: cardiac workup        ED Course  ED Course as of 06/04/22 1012   Fri Jun 03, 2022   1623 Flip Sidhu is requesting to be discharged  We explained the importance of the second troponin level  He understands and still wishes to be discharged        Labs Reviewed   CBC AND DIFFERENTIAL - Abnormal       Result Value Ref Range Status    WBC 5 45  4 31 - 10 16 Thousand/uL Final    RBC 3 47 (*) 3 88 - 5 62 Million/uL Final    Hemoglobin 10 8 (*) 12 0 - 17 0 g/dL Final    Hematocrit 33 6 (*) 36 5 - 49 3 % Final    MCV 97  82 - 98 fL Final    MCH 31 1  26 8 - 34 3 pg Final    MCHC 32 1  31 4 - 37 4 g/dL Final    RDW 15 6 (*) 11 6 - 15 1 % Final    MPV 10 5  8 9 - 12 7 fL Final    Platelets 741  530 - 390 Thousands/uL Final    nRBC 0  /100 WBCs Final    Neutrophils Relative 61  43 - 75 % Final    Immat GRANS % 1  0 - 2 % Final    Lymphocytes Relative 25  14 - 44 % Final    Monocytes Relative 10  4 - 12 % Final    Eosinophils Relative 2  0 - 6 % Final    Basophils Relative 1  0 - 1 % Final    Neutrophils Absolute 3 37  1 85 - 7 62 Thousands/µL Final    Immature Grans Absolute 0 05  0 00 - 0 20 Thousand/uL Final    Lymphocytes Absolute 1 36  0 60 - 4 47 Thousands/µL Final    Monocytes Absolute 0 56  0 17 - 1 22 Thousand/µL Final    Eosinophils Absolute 0 08  0 00 - 0 61 Thousand/µL Final    Basophils Absolute 0 03  0 00 - 0 10 Thousands/µL Final   COMPREHENSIVE METABOLIC PANEL - Abnormal    Sodium 140  135 - 147 mmol/L Final    Potassium 3 6  3 5 - 5 3 mmol/L Final    Comment: Slightly Hemolyzed:Results may be affected  Chloride 107  96 - 108 mmol/L Final    CO2 27  21 - 32 mmol/L Final    ANION GAP 6  4 - 13 mmol/L Final    BUN 19  5 - 25 mg/dL Final    Creatinine 3 20 (*) 0 60 - 1 30 mg/dL Final    Comment: Standardized to IDMS reference method    Glucose 102  65 - 140 mg/dL Final    Comment: If the patient is fasting, the ADA then defines impaired fasting glucose as > 100 mg/dL and diabetes as > or equal to 123 mg/dL  Specimen collection should occur prior to Sulfasalazine administration due to the potential for falsely depressed results  Specimen collection should occur prior to Sulfapyridine administration due to the potential for falsely elevated results  Calcium 6 7 (*) 8 4 - 10 2 mg/dL Final    Corrected Calcium 7 6 (*) 8 3 - 10 1 mg/dL Final    AST 26  13 - 39 U/L Final    Comment: Slightly Hemolyzed:Results may be affected  Specimen collection should occur prior to Sulfasalazine administration due to the potential for falsely depressed results  ALT 14  7 - 52 U/L Final    Comment: Specimen collection should occur prior to Sulfasalazine administration due to the potential for falsely depressed results       Alkaline Phosphatase 58  34 - 104 U/L Final    Total Protein 5 3 (*) 6 4 - 8 4 g/dL Final    Albumin 2 9 (*) 3 5 - 5 0 g/dL Final    Total Bilirubin 0 41  0 20 - 1 00 mg/dL Final    eGFR 19  ml/min/1 73sq m Final    Narrative: [facility] guidelines for Chronic Kidney Disease (CKD):     Stage 1 with normal or high GFR (GFR > 90 mL/min/1 73 square meters)    Stage 2 Mild CKD (GFR = 60-89 mL/min/1 73 square meters)    Stage 3A Moderate CKD (GFR = 45-59 mL/min/1 73 square meters)    Stage 3B Moderate CKD (GFR = 30-44 mL/min/1 73 square meters)    Stage 4 Severe CKD (GFR = 15-29 mL/min/1 73 square meters)    Stage 5 End Stage CKD (GFR <15 mL/min/1 73 square meters)  Note: GFR calculation is accurate only with a steady state creatinine   B-TYPE NATRIURETIC PEPTIDE (BNP) - Abnormal     (*) 0 - 100 pg/mL Final   COVID19, INFLUENZA A/B, RSV PCR, [facility] - Normal    SARS-CoV-2 Negative  Negative Final    Comment:      INFLUENZA A PCR Negative  Negative Final    Comment:      INFLUENZA B PCR Negative  Negative Final    Comment:      RSV PCR Negative  Negative Final    Comment:      Narrative:     FOR PEDIATRIC PATIENTS - copy/paste COVID Guidelines URL to browser: [email or web address]    SARS-CoV-2 assay is a Nucleic Acid Amplification assay intended for the  qualitative detection of nucleic acid from SARS-CoV-2 in nasopharyngeal  swabs  Results are for the presumptive identification of SARS-CoV-2 RNA  Positive results are indicative of infection with SARS-CoV-2, the virus  causing COVID-19, but do not rule out bacterial infection or co-infection  with other viruses  Laboratories within the [place] and its  territories are required to report all positive results to the appropriate  public health authorities  Negative results do not preclude SARS-CoV-2  infection and should not be used as the sole basis for treatment or other  patient management decisions  Negative results must be combined with  clinical observations, patient history, and epidemiological information  This test has not been FDA cleared or approved      This test has been authorized by FDA under an Emergency Use Authorization  (EUA)  This test is only authorized for the duration of time the  declaration that circumstances exist justifying the authorization of the  emergency use of an in vitro diagnostic tests for detection of SARS-CoV-2  virus and/or diagnosis of COVID-19 infection under section 564(b)(1) of  the Act, 21 U  S C  475LTE-5(R)(2), unless the authorization is terminated  or revoked sooner  The test has been validated but independent review by FDA  and CLIA is pending  Test performed using [employer] GeneXpert: This RT-PCR assay targets N2,  a region unique to SARS-CoV-2  A conserved region in the E-gene was chosen  for pan-Sarbecovirus detection which includes SARS-CoV-2  HS TROPONIN I 0HR - Normal    hs TnI 0hr 40  "Refer to ACS Flowchart"- see link ng/L Final    Comment:                                              Initial (time 0) result  If >=50 ng/L, Myocardial injury suggested ;  Type of myocardial injury and treatment strategy  to be determined  If 5-49 ng/L, a delta result at 2 hours and or 4 hours will be needed to further evaluate  If <4 ng/L, and chest pain has been >3 hours since onset, patient may qualify for discharge based on the HEART score in the ED  If <5 ng/L and <3hours since onset of chest pain, a delta result at 2 hours will be needed to further evaluate  HS Troponin 99th Percentile URL of a Health Population=12 ng/L with a 95% Confidence Interval of 8-18 ng/L  Second Troponin (time 2 hours)  If calculated delta >= 20 ng/L,  Myocardial injury suggested ; Type of myocardial injury and treatment strategy to be determined  If 5-49 ng/L and the calculated delta is 5-19 ng/L, consult medical service for evaluation  Continue evaluation for ischemia on ecg and other possible etiology and repeat hs troponin at 4 hours    If delta is <5 ng/L at 2 hours, consider discharge based on risk stratification via the HEART score (if in ED), or JALIL risk score in IP/Observation  HS Troponin 99th Percentile URL of a Health Population=12 ng/L with a 95% Confidence Interval of 8-18 ng/L    HS TROPONIN I 2HR - Normal    hs TnI 2hr 33  "Refer to ACS Flowchart"- see link ng/L Final    Comment:                                              Initial (time 0) result  If >=50 ng/L, Myocardial injury suggested ;  Type of myocardial injury and treatment strategy  to be determined  If 5-49 ng/L, a delta result at 2 hours and or 4 hours will be needed to further evaluate  If <4 ng/L, and chest pain has been >3 hours since onset, patient may qualify for discharge based on the HEART score in the ED  If <5 ng/L and <3hours since onset of chest pain, a delta result at 2 hours will be needed to further evaluate  HS Troponin 99th Percentile URL of a Health Population=12 ng/L with a 95% Confidence Interval of 8-18 ng/L  Second Troponin (time 2 hours)  If calculated delta >= 20 ng/L,  Myocardial injury suggested ; Type of myocardial injury and treatment strategy to be determined  If 5-49 ng/L and the calculated delta is 5-19 ng/L, consult medical service for evaluation  Continue evaluation for ischemia on ecg and other possible etiology and repeat hs troponin at 4 hours  If delta is <5 ng/L at 2 hours, consider discharge based on risk stratification via the HEART score (if in ED), or JALIL risk score in IP/Observation  HS Troponin 99th Percentile URL of a Health Population=12 ng/L with a 95% Confidence Interval of 8-18 ng/L  Delta 2hr hsTnI -7  <20 ng/L Final     XR chest 2 views   Final Result      No acute cardiopulmonary disease                    Workstation performed: [id number]               Critical Care Time  Procedures    Time reflects when diagnosis was documented in both MDM as applicable and the Disposition within this note     Time User Action Codes Description Comment    6/3/2022  4:30 PM Krystal Lama Add [R07 [id number]] Chest pain       ED Disposition     ED Disposition   Discharge    Condition   Stable    Date/Time   Fri Misael 3, 2022  5:57 PM    Comment   [address] discharge to home/self care  Follow-up Information     Follow up With Specialties Details Why Vincenzo [address], DO Internal Medicine Schedule an appointment as soon as possible for a visit in 2 days  [address] [facility]  [address]  [address] [facility] [address] [facility]

## 2022-06-03 NOTE — ED PROVIDER NOTES
History  Chief Complaint   Patient presents with    Chest Pain     1 hour 40 mins into dialysis pt had episode of 8/10 stabbing midsternal chest pain  Also had similar episode yesterday  Got 324 ASA in EMS     59-year-old male history of ESRD on dialysis, DM2, GERD brought in by ambulance for left-sided chest pain during dialysis  Patient states half-way through dialysis he started having pain in his left chest, initially 8/10 but reduced to 2/10 after 324 mg aspirin  Says he has had this kind of pain before during dialysis  No shortness of breath, nausea, vomiting, diaphoresis  Has been having increased cough for 1 month and was seen here previously for this cough and had non concerning workup and recent negative COVID test       History provided by:  EMS personnel and patient   used: No    Chest Pain  Pain location:  L chest  Pain quality: sharp    Pain radiates to:  Does not radiate  Pain radiates to the back: no    Pain severity:  Moderate  Onset quality:  Sudden  Duration:  1 hour  Timing:  Constant  Progression:  Improving  Chronicity:  Recurrent  Context comment:  Dialysis  Relieved by:  Aspirin  Associated symptoms: cough    Associated symptoms: no abdominal pain, no diaphoresis, no dizziness, no fever, no nausea, no near-syncope, no palpitations, no shortness of breath and not vomiting    Risk factors: diabetes mellitus        Prior to Admission Medications   Prescriptions Last Dose Informant Patient Reported? Taking?    BD Pen Needle Adina U/F 32G X 4 MM MISC  Spouse/Significant Other No No   Sig: USE TO INJECT INSULIN 4 TIMES DAILY   Blood Glucose Monitoring Suppl (True Metrix Meter) w/Device KIT  Spouse/Significant Other No No   Sig: Use to test blood sugars 3 times daily   Blood Pressure Monitoring (BLOOD PRESSURE CUFF) MISC  Spouse/Significant Other No No   Sig: Use to check blood pressure before taking blood pressure medication and 1 hour after and follow instructions provided in discharge instructions based on the readings  Cholecalciferol (Vitamin D3) 1 25 MG (16209 UT) CAPS   No No   Sig: TAKE 1 CAPSULE BY MOUTH ONE TIME PER WEEK   Continuous Blood Gluc  (DEXCOM G6 ) LANCE  Spouse/Significant Other No No   Sig: Use as directed for continuous glucose monitoring   Continuous Blood Gluc Sensor (DEXCOM G6 SENSOR) MISC  Spouse/Significant Other No No   Sig: Use as directed for continuous glucose monitoring  Change every 10 days   Continuous Blood Gluc Transmit (DEXCOM G6 TRANSMITTER) MISC  Spouse/Significant Other No No   Sig: Use as directed for continuous glucose monitoring-Change every 3 months   Incontinence Supplies (MALE URINAL) MISC  Spouse/Significant Other No No   Sig: by Does not apply route daily   Patient not taking: No sig reported   Insulin Syringe-Needle U-100 (B-D INS SYRINGE 0 5CC/30GX1/2") 30G X 1/2" 0 5 ML MISC  Spouse/Significant Other No No   Sig: Inject under the skin 4 (four) times a day   Lancets Ultra Thin 30G MISC  Spouse/Significant Other No No   Sig: Use 3 times a day   Lantus 100 UNIT/ML subcutaneous injection  Spouse/Significant Other No No   Sig: INJECT 14 UNITS UNDER THE SKIN DAILY   Nutritional Supplements (VITAMIN D BOOSTER PO)  Spouse/Significant Other Yes No   Sig: Take 0 5 mcg by mouth     ONETOUCH DELICA LANCETS 03J MISC  Spouse/Significant Other No No   Sig: by Does not apply route 3 (three) times a day   Sevelamer Carbonate 2 4 g PACK  Spouse/Significant Other Yes No   Sig: Take 1 packet by mouth Three times a day   TORSEMIDE PO  Spouse/Significant Other Yes No   Sig: Take 50 mg by mouth Pt takes 50 mg daily on non- dialysis days: sat, sun, Tues, and thrusday  Pt takes 10 mg of torsemide daily on dialysis days m- w- f      True Metrix Blood Glucose Test test strip   No No   Sig: USE 1 EACH 3 (THREE) TIMES A DAY USE AS INSTRUCTED   aluminum-magnesium hydroxide-simethicone (MYLANTA) 200-200-20 mg/5 mL suspension  Spouse/Significant Other No No   Sig: Take 30 mL by mouth every 4 (four) hours as needed for indigestion or heartburn   amoxicillin (AMOXIL) 400 MG/5ML suspension   No No   Sig: Take 6 3 mL (500 mg total) by mouth daily for 7 days As directed by your nephrologist   aspirin (ECOTRIN LOW STRENGTH) 81 mg EC tablet  Spouse/Significant Other Yes No   Sig: Take 81 mg by mouth daily Resume on      atorvastatin (LIPITOR) 40 mg tablet  Spouse/Significant Other No No   Sig: Take 1 tablet (40 mg total) by mouth daily with dinner   calcium acetate (CALPHRON) 667 mg  Spouse/Significant Other Yes No   Sig: TAKE 2 TABLETS BY MOUTH THREE TIMES DAILY WITH MEALS   calcium carbonate (TUMS) 500 mg chewable tablet  Spouse/Significant Other No No   Sig: Chew 1 tablet (500 mg total) daily as needed for indigestion or heartburn   carvedilol (COREG) 12 5 mg tablet   No No   Sig: TAKE 1 TABLET BY MOUTH TWICE A DAY WITH FOOD   clopidogrel (PLAVIX) 75 mg tablet   No No   Sig: Take 1 tablet (75 mg total) by mouth in the morning  clopidogrel (Plavix) 75 mg tablet   No No   Sig: Take 1 tablet (75 mg total) by mouth in the morning     doxercalciferol (HECTOROL) 0 5 mcg capsule  Spouse/Significant Other Yes No   Si mcg   Patient not taking: No sig reported   insulin lispro (HumaLOG KwikPen) 100 units/mL injection pen  Spouse/Significant Other No No   Sig: INJECT 4 UNITS 3 TIMES A DAY WITH MEALS PLUS SCALE (max daily dose 42 units)   meclizine (ANTIVERT) 25 mg tablet  Spouse/Significant Other No No   Sig: Take 1 tablet (25 mg total) by mouth every 8 (eight) hours as needed for dizziness or nausea   sertraline (Zoloft) 25 mg tablet   No No   Sig: Take 1/2 tablet every day at bedtime for 4 days, then increase to 1 tablet every day at bedtime      Facility-Administered Medications: None       Past Medical History:   Diagnosis Date    Cerebrovascular accident (CVA) due to thrombosis of left middle cerebral artery ([facility] [address] ) 2018    Chronic kidney disease     [facility]     GERD (gastroesophageal reflux disease)     Hypercholesteremia     Hyperlipidemia     Hypertension     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    Neuropathy     Obesity     [facility]omyel[facility]     last assessed 11/4/16    PVC's (premature ventricular contractions)     sees cardiology Dr Sancho camargo    Stroke [facility])     last weeof July 2018 [address]    TIA (transient ischemic attack) 10/28/2018       Past Surgical History:   Procedure Laterality Date    ABDOMINAL SURGERY      CARDIAC CATHETERIZATION N/A 5/2/2022    Procedure: Cardiac Coronary Angiogram;  Surgeon: Nick Delacruz MD;  Location[facility] CARD[facility]; Service: Cardiology    CARDIAC CATHETERIZATION N/A 5/2/2022    Procedure: Cardiac pci;  Surgeon: Nick Delacruz MD;  [facility] CARD[facility]; Service: Cardiology    CHOLECYSTECTOMY      Percutaneous    COLONOSCOPY      CYSTOSCOPY      OTHER SURGICAL HISTORY      "stimulator to control bowel movements"    OK ESOPHAGOGASTRODUODENOSCOPY TRANSORAL DIAGNOSTIC N/A 9/27/2016    Procedure: ESOPHAGOGASTRODUODENOSCOPY (EGD); Surgeon: Willis Gutierrez MD;  Location: [facility] GI LAB;   Service: Gastroenterology    OK LAP,CHOLECYSTECTOMY N/A 2/29/2016    Procedure: LAPAROSCOPIC CHOLECYSTECTOMY ;  Surgeon: Darrel Koyanagi, DO;  Location: [facility] Main OR;  Service: General    ROTATOR CUFF REPAIR Right     TOE AMPUTATION Right 10/28/2016    Procedure: 3RD TOE AMPUTATION ;  Surgeon: Azalia Forde DPM;  Location: [facility] Main OR;  Service:        Family History   Problem Relation Age of Onset    Leukemia Mother     Liver disease Mother     Lung cancer Mother         heavy smoker - 3 ppd    Heart disease Father     Liver disease Father     Multiple myeloma Sister     Breast cancer Sister     Urolithiasis Family     Alcohol abuse Neg Hx     Depression Neg Hx     Drug abuse Neg Hx     Substance Abuse Neg Hx     Mental illness Neg Hx      I have reviewed and agree with the history as documented  E-Cigarette/Vaping    E-Cigarette Use Never User      E-Cigarette/Vaping Substances    Nicotine No     THC No     CBD No     Flavoring No     Other No     Unknown No      Social History     Tobacco Use    Smoking status: Never Smoker    Smokeless tobacco: Never Used   Vaping Use    Vaping Use: Never used   Substance Use Topics    Alcohol use: Not Currently    Drug use: No        Review of Systems   Constitutional: Negative for activity change, diaphoresis, fever and unexpected weight change  HENT: Negative for postnasal drip and rhinorrhea  Eyes: Negative for visual disturbance  Respiratory: Positive for cough and chest tightness  Negative for shortness of breath  Cardiovascular: Positive for chest pain  Negative for palpitations, leg swelling and near-syncope  Gastrointestinal: Negative for abdominal pain, blood in stool, constipation, diarrhea, nausea and vomiting  Genitourinary: Negative for dysuria and hematuria  Skin: Negative for color change and wound  Allergic/Immunologic: Negative for immunocompromised state  Neurological: Negative for dizziness and syncope  Psychiatric/Behavioral: Negative for dysphoric mood  The patient is not nervous/anxious  All other systems reviewed and are negative  Physical Exam  ED Triage Vitals [06/03/22 1413]   Temperature Pulse Respirations Blood Pressure SpO2   98 8 °F (37 1 °C) 77 18 130/63 96 %      Temp Source Heart Rate Source Patient Position - Orthostatic VS BP Location FiO2 (%)   Oral Monitor Lying Right arm --      Pain Score       --             Orthostatic Vital Signs  Vitals:    06/03/22 1413 06/03/22 1742   BP: 130/63 (!) 197/76   Pulse: 77 74   Patient Position - Orthostatic VS: Lying Sitting       Physical Exam  Vitals and nursing note reviewed  Constitutional:       Appearance: He is obese  He is not toxic-appearing or diaphoretic  HENT:      Head: Normocephalic and atraumatic  Right Ear: External ear normal       Left Ear: External ear normal       Nose: Nose normal       Mouth/Throat:      Mouth: Mucous membranes are moist    Eyes:      General: No scleral icterus  Extraocular Movements: Extraocular movements intact  Conjunctiva/sclera: Conjunctivae normal    Cardiovascular:      Rate and Rhythm: Normal rate and regular rhythm  Pulses: Normal pulses  Radial pulses are 2+ on the right side  Dorsalis pedis pulses are 2+ on the right side and 2+ on the left side  Heart sounds: Normal heart sounds, S1 normal and S2 normal  No murmur heard  Arteriovenous access: left arteriovenous access is present  Pulmonary:      Effort: Pulmonary effort is normal  No respiratory distress  Breath sounds: No stridor  Examination of the right-lower field reveals rhonchi  Examination of the left-lower field reveals rhonchi  Rhonchi present  No wheezing  Abdominal:      General: Bowel sounds are normal       Palpations: Abdomen is soft  Tenderness: There is no abdominal tenderness  Musculoskeletal:         General: Normal range of motion  Cervical back: Normal range of motion  Right lower le+ Pitting Edema present  Left lower le+ Pitting Edema present  Skin:     General: Skin is warm and dry  Coloration: Skin is not jaundiced  Neurological:      General: No focal deficit present  Mental Status: He is alert and oriented to person, place, and time     Psychiatric:         Mood and Affect: Mood normal          ED Medications  Medications - No data to display    Diagnostic Studies  Results Reviewed     Procedure Component Value Units Date/Time    HS Troponin I 2hr [[id number]]  (Normal) Collected: 22 1653    Lab Status: Final result Specimen: Blood from Hand, Right Updated: 22 175     hs TnI 2hr 33 ng/L      Delta 2hr hsTnI -7 ng/L     Comprehensive metabolic panel [[id number]]  (Abnormal) Collected: 22 1426    Lab Status: Final result Specimen: Blood from Arm, Right Updated: 06/03/22 1540     Sodium 140 mmol/L      Potassium 3 6 mmol/L      Chloride 107 mmol/L      CO2 27 mmol/L      ANION GAP 6 mmol/L      BUN 19 mg/dL      Creatinine 3 20 mg/dL      Glucose 102 mg/dL      Calcium 6 7 mg/dL      Corrected Calcium 7 6 mg/dL      AST 26 U/L      ALT 14 U/L      Alkaline Phosphatase 58 U/L      Total Protein 5 3 g/dL      Albumin 2 9 g/dL      Total Bilirubin 0 41 mg/dL      eGFR 19 ml/min/1 73sq m     Narrative:      National Kidney Disease Foundation guidelines for Chronic Kidney Disease (CKD):     Stage 1 with normal or high GFR (GFR > 90 mL/min/1 73 square meters)    Stage 2 Mild CKD (GFR = 60-89 mL/min/1 73 square meters)    Stage 3A Moderate CKD (GFR = 45-59 mL/min/1 73 square meters)    Stage 3B Moderate CKD (GFR = 30-44 mL/min/1 73 square meters)    Stage 4 Severe CKD (GFR = 15-29 mL/min/1 73 square meters)    Stage 5 End Stage CKD (GFR <15 mL/min/1 73 square meters)  Note: GFR calculation is accurate only with a steady state creatinine    HS Troponin 0hr (reflex protocol) [[id number]]  (Normal) Collected: 06/03/22 1426    Lab Status: Final result Specimen: Blood from Arm, Right Updated: 06/03/22 1538     hs TnI 0hr 40 ng/L     COVID/FLU/RSV - 2 hour TAT [[id number]]  (Normal) Collected: 06/03/22 1426    Lab Status: Final result Specimen: Nares from Nose Updated: 06/03/22 1526     SARS-CoV-2 Negative     INFLUENZA A PCR Negative     INFLUENZA B PCR Negative     RSV PCR Negative    Narrative:      FOR PEDIATRIC PATIENTS - copy/paste COVID Guidelines URL to browser: [email or web address]  [email or web address]    SARS-CoV-2 assay is a Nucleic Acid Amplification assay intended for the  qualitative detection of nucleic acid from SARS-CoV-2 in nasopharyngeal  swabs  Results are for the presumptive identification of SARS-CoV-2 RNA      Positive results are indicative of infection with SARS-CoV-2, the virus  causing COVID-19, but do not rule out bacterial infection or co-infection  with other viruses  Laboratories within the [place] and its  territories are required to report all positive results to the appropriate  public health authorities  Negative results do not preclude SARS-CoV-2  infection and should not be used as the sole basis for treatment or other  patient management decisions  Negative results must be combined with  clinical observations, patient history, and epidemiological information  This test has not been FDA cleared or approved  This test has been authorized by FDA under an Emergency Use Authorization  (EUA)  This test is only authorized for the duration of time the  declaration that circumstances exist justifying the authorization of the  emergency use of an in vitro diagnostic tests for detection of SARS-CoV-2  virus and/or diagnosis of COVID-19 infection under section 564(b)(1) of  the Act, 21 U  S C  410LBC-0(T)(7), unless the authorization is terminated  or revoked sooner  The test has been validated but independent review by FDA  and CLIA is pending  Test performed using Proximetry GeneXpert: This RT-PCR assay targets N2,  a region unique to SARS-CoV-2  A conserved region in the E-gene was chosen  for pan-Sarbecovirus detection which includes SARS-CoV-2      B-Type Natriuretic Peptide(BNP) [place], [place], [place] [facility] Only [[id number]]  (Abnormal) Collected: 06/03/22 1426    Lab Status: Final result Specimen: Blood from Arm, Right Updated: 06/03/22 1525      pg/mL     CBC and differential [[id number]]  (Abnormal) Collected: 06/03/22 1426    Lab Status: Final result Specimen: Blood from Arm, Right Updated: 06/03/22 1449     WBC 5 45 Thousand/uL      RBC 3 47 Million/uL      Hemoglobin 10 8 g/dL      Hematocrit 33 6 %      MCV 97 fL      MCH 31 1 pg      MCHC 32 1 g/dL      RDW 15 6 %      MPV 10 5 fL      Platelets 856 Thousands/uL      nRBC 0 /100 WBCs Neutrophils Relative 61 %      Immat GRANS % 1 %      Lymphocytes Relative 25 %      Monocytes Relative 10 %      Eosinophils Relative 2 %      Basophils Relative 1 %      Neutrophils Absolute 3 37 Thousands/µL      Immature Grans Absolute 0 05 Thousand/uL      Lymphocytes Absolute 1 36 Thousands/µL      Monocytes Absolute 0 56 Thousand/µL      Eosinophils Absolute 0 08 Thousand/µL      Basophils Absolute 0 03 Thousands/µL                  XR chest 2 views   Final Result by Emily Moeller MD (06/03 1532)      No acute cardiopulmonary disease  Workstation performed: [id number]               Procedures  ECG 12 Lead Documentation Only    Date/Time: 6/3/2022 2:34 PM  Performed by: Mandeep Niño MD  Authorized by: Mandeep Niño MD     ECG reviewed by me, the ED Provider: yes    Patient location:  ED  Previous ECG:     Previous ECG:  Compared to current    Similarity:  No change    Comparison to cardiac monitor: Yes    Interpretation:     Interpretation: non-specific    Rate:     ECG rate:  74    ECG rate assessment: normal    Rhythm:     Rhythm: sinus rhythm    Ectopy:     Ectopy: none    QRS:     QRS axis:  Normal    QRS intervals:   Wide  Conduction:     Conduction: abnormal      Abnormal conduction: complete RBBB    ST segments:     ST segments:  Non-specific    Elevation:  II and aVR  T waves:     T waves: inverted      Inverted:  III  Comments:      RBBB and nonspecific ST depressions similar to previous ECG          ED Course  ED Course as of 06/04/22 1625   Fri Jun 03, 2022   1450 WBC: 5 45   1557 hs TnI 0hr: 40             HEART Risk Score    Flowsheet Row Most Recent Value   Heart Score Risk Calculator    History 1 Filed at: 06/03/2022 1636   ECG 0 Filed at: 06/03/2022 1636   Age 1 Filed at: 06/03/2022 1636   Risk Factors 2 Filed at: 06/03/2022 1636   Troponin 2  [baseline troponin for him is 50 due to ckd] Filed at: 06/03/2022 1636   HEART Score 6 Filed at: 06/03/2022 1636 MDM  Number of Diagnoses or Management Options  Chest pain  Diagnosis management comments: Initial impression:  Having chest pain similar to what he has had before during dialysis likely related to fluid shifts  No associated symptoms that would more strongly indicate ACS  Will do ACS evaluation and check his labs  Initial work up:  ACS he fell including EKG/troponin/chest x-ray, labs, COVID swab    Final impression:  Initial troponin 40 which is lower than it has been in the past and higher likely due to ESRD  All his labs were within normal limits for him  Pt care signed out to Dr Lazaro Merrill pending repeat troponin with plan to discharge if not significantly elevated      Disposition  Final diagnoses:   Chest pain   ESRD on dialysis [facility])     Time reflects when diagnosis was documented in both MDM as applicable and the Disposition within this note     Time User Action Codes Description Comment    6/3/2022  4:30 PM Shefali Nayak Add [R07 9] Chest pain     6/4/2022  4:24 PM Vaishnavi Hutson Add [N18 6,  Z99 2] ESRD on dialysis [facility])       ED Disposition     ED Disposition   Discharge    Condition   Stable    Date/Time   Fri Misael 3, 2022  5:57 PM    Comment   [address] discharge to home/self care  Follow-up Information     Follow up With Specialties Details [address], DO Internal Medicine Schedule an appointment as soon as possible for a visit in 2 days  [address]  [phone]            Discharge Medication List as of 6/3/2022  4:30 PM      CONTINUE these medications which have NOT CHANGED    Details   aluminum-magnesium hydroxide-simethicone (MYLANTA) 200-200-20 mg/5 mL suspension Take 30 mL by mouth every 4 (four) hours as needed for indigestion or heartburn, Starting Thu 4/21/2022, Normal      aspirin (ECOTRIN LOW STRENGTH) 81 mg EC tablet Take 81 mg by mouth daily Resume on 8/14  , Historical Med      atorvastatin (LIPITOR) 40 mg tablet Take 1 tablet (40 mg total) by mouth daily with dinner, Starting Mon 12/13/2021, Normal      BD Pen Needle Adina U/F 32G X 4 MM MISC USE TO INJECT INSULIN 4 TIMES DAILY, Normal      Blood Glucose Monitoring Suppl (True Metrix Meter) w/Device KIT Use to test blood sugars 3 times daily, Normal      Blood Pressure Monitoring (BLOOD PRESSURE CUFF) MISC Use to check blood pressure before taking blood pressure medication and 1 hour after and follow instructions provided in discharge instructions based on the readings  , Print      calcium acetate (CALPHRON) 667 mg TAKE 2 TABLETS BY MOUTH THREE TIMES DAILY WITH MEALS, Historical Med      calcium carbonate (TUMS) 500 mg chewable tablet Chew 1 tablet (500 mg total) daily as needed for indigestion or heartburn, Starting u 4/21/2022, No Print      carvedilol (COREG) 12 5 mg tablet TAKE 1 TABLET BY MOUTH TWICE A DAY WITH FOOD, Normal      Cholecalciferol (Vitamin D3) 1 25 MG (38530 UT) CAPS TAKE 1 CAPSULE BY MOUTH ONE TIME PER WEEK, Normal      !! clopidogrel (PLAVIX) 75 mg tablet Take 1 tablet (75 mg total) by mouth in the morning , Starting Mon 5/23/2022, Normal      !! clopidogrel (Plavix) 75 mg tablet Take 1 tablet (75 mg total) by mouth in the morning , Starting Mon 5/23/2022, Normal      Continuous Blood Gluc  (DEXCOM G6 ) LANCE Use as directed for continuous glucose monitoring, Normal      Continuous Blood Gluc Sensor (DEXCOM G6 SENSOR) MISC Use as directed for continuous glucose monitoring   Change every 10 days, Normal      Continuous Blood Gluc Transmit (DEXCOM G6 TRANSMITTER) MISC Use as directed for continuous glucose monitoring-Change every 3 months, Normal      doxercalciferol (HECTOROL) 0 5 mcg capsule 4 mcg, Starting Wed 11/10/2021, Until Wed 11/9/2022 at 2359, Historical Med      Incontinence Supplies (MALE URINAL) MISC by Does not apply route daily, Starting Mon 9/24/2018, Print      insulin lispro (HumaLOG KwikPen) 100 units/mL injection pen INJECT 4 UNITS 3 TIMES A DAY WITH MEALS PLUS SCALE (max daily dose 42 units), Normal      Insulin Syringe-Needle U-100 (B-D INS SYRINGE 0 5CC/30GX1/2") 30G X 1/2" 0 5 ML MISC Inject under the skin 4 (four) times a day, Starting Fri 6/5/2020, Normal      !! Lancets Ultra Thin 30G MISC Use 3 times a day, Normal      Lantus 100 UNIT/ML subcutaneous injection INJECT 14 UNITS UNDER THE SKIN DAILY, Starting Wed 4/13/2022, Normal      meclizine (ANTIVERT) 25 mg tablet Take 1 tablet (25 mg total) by mouth every 8 (eight) hours as needed for dizziness or nausea, Starting Thu 4/7/2022, Normal      Nutritional Supplements (VITAMIN D BOOSTER PO) Take 0 5 mcg by mouth  , Starting Mon 6/21/2021, Until Mon 6/20/2022 at 2359, Historical Med      !! ONETOUCH DELICA LANCETS 94H MISC by Does not apply route 3 (three) times a day, Starting Tue 11/27/2018, Normal      sertraline (Zoloft) 25 mg tablet Take 1/2 tablet every day at bedtime for 4 days, then increase to 1 tablet every day at bedtime, Normal      Sevelamer Carbonate 2 4 g PACK Take 1 packet by mouth Three times a day, Starting Wed 5/4/2022, Historical Med      TORSEMIDE PO Take 50 mg by mouth Pt takes 50 mg daily on non- dialysis days: sat, sun, Tues, and thrusday  Pt takes 10 mg of torsemide daily on dialysis days m- w- f  , Starting Mon 10/25/2021, Historical Med      True Metrix Blood Glucose Test test strip USE 1 EACH 3 (THREE) TIMES A DAY USE AS INSTRUCTED, Starting Thu 5/12/2022, Normal       !! - Potential duplicate medications found  Please discuss with provider  STOP taking these medications       amoxicillin (AMOXIL) 400 MG/5ML suspension Comments:   Reason for Stopping:             No discharge procedures on file      PDMP Review       Value Time User    PDMP Reviewed  Yes 12/30/2020 10:40 PM Audrey Ho MD           ED Provider  Attending physically available and evaluated [address] [facility]  I managed the patient along with the ED Attending      Electronically Signed by         Shilo Alvarado MD  06/04/22 4960

## 2022-06-03 NOTE — ED CARE HANDOFF
Emergency Department Sign Out Note        Sign out and transfer of care from Dr Joel Adames  See Separate Emergency Department note  The patient, Kristal Singh, was evaluated by the previous provider for Chest Pain  Workup Completed:  CMP, CBC, COVID/Flu/RSV, BNP, 0 hr troponin, ECG, Chest-Xray    ED Course / Workup Pending (followup):  2 hr troponin      HEART Risk Score    Flowsheet Row Most Recent Value   Heart Score Risk Calculator    History 1 Filed at: 06/03/2022 1636   ECG 0 Filed at: 06/03/2022 1636   Age 1 Filed at: 06/03/2022 1636   Risk Factors 2 Filed at: 06/03/2022 1636   Troponin 2  [baseline troponin for him is 50 due to ckd] Filed at: 06/03/2022 1636   HEART Score 6 Filed at: 06/03/2022 1636                                     Procedures  MDM  Number of Diagnoses or Management Options  Chest pain: new and requires workup  Risk of Complications, Morbidity, and/or Mortality  General comments: Beverley Alcala is a 70-year-old male who presented to the emergency department today with chest pain  ECG was unchanged from previous  Initial troponin was 40, this is actually lower than most recent troponins 1 month ago  Other lab work unremarkable  2 hour troponin was 33, for a delta of -7  Patient is stable for discharge  Patient given strict return precautions  Patient Progress  Patient progress: stable          Disposition  Final diagnoses:   Chest pain     Time reflects when diagnosis was documented in both MDM as applicable and the Disposition within this note     Time User Action Codes Description Comment    6/3/2022  4:30 PM Froy Kuo Add [R07 9] Chest pain       ED Disposition     None      Follow-up Information     Follow up With Specialties Details Why Vincenzo 124, DO Internal Medicine Schedule an appointment as soon as possible for a visit in 2 days  [address] [place] [address]  [phone]          Patient's Medications   Discharge Prescriptions    No medications on file     No discharge procedures on file         ED Provider  Electronically Signed by     Nettie Guardado DO  06/03/22 8292

## 2022-06-03 NOTE — ED NOTES
Patient refusing second troponin draw x2 attempts  Asking to be discharged from ED at this time  Dr Sheldon Bush, THIERNO  06/03/22 3939

## 2022-06-05 LAB
ATRIAL RATE: 74 BPM
P AXIS: 71 DEGREES
PR INTERVAL: 170 MS
QRS AXIS: 17 DEGREES
QRSD INTERVAL: 156 MS
QT INTERVAL: 466 MS
QTC INTERVAL: 517 MS
T WAVE AXIS: -7 DEGREES
VENTRICULAR RATE: 74 BPM

## 2022-06-05 PROCEDURE — 93010 ELECTROCARDIOGRAM REPORT: CPT | Performed by: INTERNAL MEDICINE

## 2022-06-07 DIAGNOSIS — E78.2 MIXED HYPERLIPIDEMIA: ICD-10-CM

## 2022-06-08 RX ORDER — ATORVASTATIN CALCIUM 40 MG/1
TABLET, FILM COATED ORAL
Qty: 90 TABLET | Refills: 1 | Status: SHIPPED | OUTPATIENT
Start: 2022-06-08

## 2022-06-10 ENCOUNTER — APPOINTMENT (INPATIENT)
Dept: DIALYSIS | Facility: HOSPITAL | Age: 62
DRG: 682 | End: 2022-06-10
Payer: MEDICARE

## 2022-06-10 ENCOUNTER — APPOINTMENT (EMERGENCY)
Dept: RADIOLOGY | Facility: HOSPITAL | Age: 62
DRG: 682 | End: 2022-06-10
Payer: MEDICARE

## 2022-06-10 ENCOUNTER — HOSPITAL ENCOUNTER (INPATIENT)
Facility: HOSPITAL | Age: 62
LOS: 1 days | Discharge: HOME | DRG: 682 | End: 2022-06-11
Attending: EMERGENCY MEDICINE | Admitting: STUDENT IN AN ORGANIZED HEALTH CARE EDUCATION/TRAINING PROGRAM
Payer: MEDICARE

## 2022-06-10 DIAGNOSIS — R42 DIZZINESS: ICD-10-CM

## 2022-06-10 DIAGNOSIS — R07.9 CARDIAC CHEST PAIN: Primary | ICD-10-CM

## 2022-06-10 DIAGNOSIS — I25.10 CAD (CORONARY ARTERY DISEASE): ICD-10-CM

## 2022-06-10 DIAGNOSIS — Z98.61 POSTSURGICAL PERCUTANEOUS TRANSLUMINAL CORONARY ANGIOPLASTY STATUS: Primary | ICD-10-CM

## 2022-06-10 DIAGNOSIS — N18.6 ESRD ON DIALYSIS (HCC): ICD-10-CM

## 2022-06-10 DIAGNOSIS — R77.8 ELEVATED TROPONIN: ICD-10-CM

## 2022-06-10 DIAGNOSIS — Z99.2 ESRD ON DIALYSIS (HCC): ICD-10-CM

## 2022-06-10 PROBLEM — N19 UREMIA: Status: ACTIVE | Noted: 2022-06-10

## 2022-06-10 LAB
2HR DELTA HS TROPONIN: -22 NG/L
4HR DELTA HS TROPONIN: -48 NG/L
ALBUMIN SERPL BCP-MCNC: 4 G/DL (ref 3.5–5)
ALP SERPL-CCNC: 85 U/L (ref 34–104)
ALT SERPL W P-5'-P-CCNC: 13 U/L (ref 7–52)
ANION GAP SERPL CALCULATED.3IONS-SCNC: 9 MMOL/L (ref 4–13)
APTT PPP: 34 SECONDS (ref 23–37)
AST SERPL W P-5'-P-CCNC: 14 U/L (ref 13–39)
ATRIAL RATE: 73 BPM
BASOPHILS # BLD AUTO: 0.02 THOUSANDS/ΜL (ref 0–0.1)
BASOPHILS NFR BLD AUTO: 0 % (ref 0–1)
BILIRUB SERPL-MCNC: 0.48 MG/DL (ref 0.2–1)
BUN SERPL-MCNC: 47 MG/DL (ref 5–25)
CALCIUM SERPL-MCNC: 9.1 MG/DL (ref 8.4–10.2)
CARDIAC TROPONIN I PNL SERPL HS: 414 NG/L
CARDIAC TROPONIN I PNL SERPL HS: 440 NG/L
CARDIAC TROPONIN I PNL SERPL HS: 462 NG/L
CHLORIDE SERPL-SCNC: 102 MMOL/L (ref 96–108)
CO2 SERPL-SCNC: 25 MMOL/L (ref 21–32)
CREAT SERPL-MCNC: 7.11 MG/DL (ref 0.6–1.3)
EOSINOPHIL # BLD AUTO: 0.16 THOUSAND/ΜL (ref 0–0.61)
EOSINOPHIL NFR BLD AUTO: 3 % (ref 0–6)
ERYTHROCYTE [DISTWIDTH] IN BLOOD BY AUTOMATED COUNT: 15.1 % (ref 11.6–15.1)
GFR SERPL CREATININE-BSD FRML MDRD: 7 ML/MIN/1.73SQ M
GLUCOSE SERPL-MCNC: 106 MG/DL (ref 65–140)
GLUCOSE SERPL-MCNC: 113 MG/DL (ref 65–140)
GLUCOSE SERPL-MCNC: 131 MG/DL (ref 65–140)
GLUCOSE SERPL-MCNC: 185 MG/DL (ref 65–140)
GLUCOSE SERPL-MCNC: 201 MG/DL (ref 65–140)
GLUCOSE SERPL-MCNC: 213 MG/DL (ref 65–140)
HCT VFR BLD AUTO: 35.5 % (ref 36.5–49.3)
HGB BLD-MCNC: 11.1 G/DL (ref 12–17)
IMM GRANULOCYTES # BLD AUTO: 0.05 THOUSAND/UL (ref 0–0.2)
IMM GRANULOCYTES NFR BLD AUTO: 1 % (ref 0–2)
INR PPP: 1.01 (ref 0.84–1.19)
LIPASE SERPL-CCNC: 24 U/L (ref 11–82)
LYMPHOCYTES # BLD AUTO: 1.3 THOUSANDS/ΜL (ref 0.6–4.47)
LYMPHOCYTES NFR BLD AUTO: 22 % (ref 14–44)
MCH RBC QN AUTO: 30.6 PG (ref 26.8–34.3)
MCHC RBC AUTO-ENTMCNC: 31.3 G/DL (ref 31.4–37.4)
MCV RBC AUTO: 98 FL (ref 82–98)
MONOCYTES # BLD AUTO: 0.69 THOUSAND/ΜL (ref 0.17–1.22)
MONOCYTES NFR BLD AUTO: 12 % (ref 4–12)
NEUTROPHILS # BLD AUTO: 3.63 THOUSANDS/ΜL (ref 1.85–7.62)
NEUTS SEG NFR BLD AUTO: 62 % (ref 43–75)
NRBC BLD AUTO-RTO: 0 /100 WBCS
P AXIS: 43 DEGREES
PLATELET # BLD AUTO: 130 THOUSANDS/UL (ref 149–390)
PMV BLD AUTO: 10.1 FL (ref 8.9–12.7)
POTASSIUM SERPL-SCNC: 4.9 MMOL/L (ref 3.5–5.3)
PR INTERVAL: 184 MS
PROT SERPL-MCNC: 7.1 G/DL (ref 6.4–8.4)
PROTHROMBIN TIME: 13.3 SECONDS (ref 11.6–14.5)
QRS AXIS: -14 DEGREES
QRSD INTERVAL: 146 MS
QT INTERVAL: 418 MS
QTC INTERVAL: 451 MS
RBC # BLD AUTO: 3.63 MILLION/UL (ref 3.88–5.62)
SODIUM SERPL-SCNC: 136 MMOL/L (ref 135–147)
T WAVE AXIS: -22 DEGREES
VENTRICULAR RATE: 70 BPM
WBC # BLD AUTO: 5.85 THOUSAND/UL (ref 4.31–10.16)

## 2022-06-10 PROCEDURE — 99285 EMERGENCY DEPT VISIT HI MDM: CPT | Performed by: EMERGENCY MEDICINE

## 2022-06-10 PROCEDURE — 99222 1ST HOSP IP/OBS MODERATE 55: CPT | Performed by: NURSE PRACTITIONER

## 2022-06-10 PROCEDURE — 93010 ELECTROCARDIOGRAM REPORT: CPT | Performed by: NURSE PRACTITIONER

## 2022-06-10 PROCEDURE — 99285 EMERGENCY DEPT VISIT HI MDM: CPT

## 2022-06-10 PROCEDURE — 85730 THROMBOPLASTIN TIME PARTIAL: CPT

## 2022-06-10 PROCEDURE — 36415 COLL VENOUS BLD VENIPUNCTURE: CPT

## 2022-06-10 PROCEDURE — 83690 ASSAY OF LIPASE: CPT

## 2022-06-10 PROCEDURE — 93005 ELECTROCARDIOGRAM TRACING: CPT

## 2022-06-10 PROCEDURE — 84484 ASSAY OF TROPONIN QUANT: CPT

## 2022-06-10 PROCEDURE — 99223 1ST HOSP IP/OBS HIGH 75: CPT | Performed by: STUDENT IN AN ORGANIZED HEALTH CARE EDUCATION/TRAINING PROGRAM

## 2022-06-10 PROCEDURE — 82948 REAGENT STRIP/BLOOD GLUCOSE: CPT

## 2022-06-10 PROCEDURE — 71045 X-RAY EXAM CHEST 1 VIEW: CPT

## 2022-06-10 PROCEDURE — 80053 COMPREHEN METABOLIC PANEL: CPT

## 2022-06-10 PROCEDURE — 99222 1ST HOSP IP/OBS MODERATE 55: CPT | Performed by: INTERNAL MEDICINE

## 2022-06-10 PROCEDURE — 85610 PROTHROMBIN TIME: CPT

## 2022-06-10 PROCEDURE — 85025 COMPLETE CBC W/AUTO DIFF WBC: CPT

## 2022-06-10 RX ORDER — CARVEDILOL 12.5 MG/1
25 TABLET ORAL 2 TIMES DAILY WITH MEALS
Status: DISCONTINUED | OUTPATIENT
Start: 2022-06-10 | End: 2022-06-11 | Stop reason: HOSPADM

## 2022-06-10 RX ORDER — HEPARIN SODIUM 10000 [USP'U]/100ML
12 INJECTION, SOLUTION INTRAVENOUS
Status: DISCONTINUED | OUTPATIENT
Start: 2022-06-10 | End: 2022-06-10

## 2022-06-10 RX ORDER — INSULIN LISPRO 100 [IU]/ML
1-6 INJECTION, SOLUTION INTRAVENOUS; SUBCUTANEOUS
Status: DISCONTINUED | OUTPATIENT
Start: 2022-06-10 | End: 2022-06-11 | Stop reason: HOSPADM

## 2022-06-10 RX ORDER — HEPARIN SODIUM 1000 [USP'U]/ML
4000 INJECTION, SOLUTION INTRAVENOUS; SUBCUTANEOUS ONCE
Status: DISCONTINUED | OUTPATIENT
Start: 2022-06-10 | End: 2022-06-10

## 2022-06-10 RX ORDER — SENNOSIDES 8.6 MG
1 TABLET ORAL DAILY
Status: DISCONTINUED | OUTPATIENT
Start: 2022-06-10 | End: 2022-06-11 | Stop reason: HOSPADM

## 2022-06-10 RX ORDER — HEPARIN SODIUM 1000 [USP'U]/ML
2000 INJECTION, SOLUTION INTRAVENOUS; SUBCUTANEOUS
Status: DISCONTINUED | OUTPATIENT
Start: 2022-06-10 | End: 2022-06-10

## 2022-06-10 RX ORDER — NITROGLYCERIN 0.4 MG/1
0.4 TABLET SUBLINGUAL
Status: DISCONTINUED | OUTPATIENT
Start: 2022-06-10 | End: 2022-06-11 | Stop reason: HOSPADM

## 2022-06-10 RX ORDER — DOCUSATE SODIUM 100 MG/1
100 CAPSULE, LIQUID FILLED ORAL 2 TIMES DAILY
Status: DISCONTINUED | OUTPATIENT
Start: 2022-06-10 | End: 2022-06-11 | Stop reason: HOSPADM

## 2022-06-10 RX ORDER — DOXERCALCIFEROL 2 UG/ML
7 INJECTION, SOLUTION INTRAVENOUS 3 TIMES WEEKLY
Status: DISCONTINUED | OUTPATIENT
Start: 2022-06-10 | End: 2022-06-11 | Stop reason: HOSPADM

## 2022-06-10 RX ORDER — ONDANSETRON 2 MG/ML
4 INJECTION INTRAMUSCULAR; INTRAVENOUS EVERY 6 HOURS PRN
Status: DISCONTINUED | OUTPATIENT
Start: 2022-06-10 | End: 2022-06-11 | Stop reason: HOSPADM

## 2022-06-10 RX ORDER — CINACALCET 30 MG/1
30 TABLET, FILM COATED ORAL 3 TIMES WEEKLY
Status: DISCONTINUED | OUTPATIENT
Start: 2022-06-10 | End: 2022-06-11 | Stop reason: HOSPADM

## 2022-06-10 RX ORDER — ISOSORBIDE MONONITRATE 30 MG/1
30 TABLET, EXTENDED RELEASE ORAL DAILY
Status: DISCONTINUED | OUTPATIENT
Start: 2022-06-10 | End: 2022-06-11 | Stop reason: HOSPADM

## 2022-06-10 RX ORDER — HEPARIN SODIUM 1000 [USP'U]/ML
4000 INJECTION, SOLUTION INTRAVENOUS; SUBCUTANEOUS
Status: DISCONTINUED | OUTPATIENT
Start: 2022-06-10 | End: 2022-06-10

## 2022-06-10 RX ORDER — ATORVASTATIN CALCIUM 40 MG/1
40 TABLET, FILM COATED ORAL
Status: DISCONTINUED | OUTPATIENT
Start: 2022-06-10 | End: 2022-06-11 | Stop reason: HOSPADM

## 2022-06-10 RX ORDER — CLOPIDOGREL BISULFATE 75 MG/1
75 TABLET ORAL DAILY
Status: DISCONTINUED | OUTPATIENT
Start: 2022-06-10 | End: 2022-06-11 | Stop reason: HOSPADM

## 2022-06-10 RX ORDER — CARVEDILOL 12.5 MG/1
12.5 TABLET ORAL 2 TIMES DAILY WITH MEALS
Status: DISCONTINUED | OUTPATIENT
Start: 2022-06-10 | End: 2022-06-10

## 2022-06-10 RX ORDER — ACETAMINOPHEN 325 MG/1
650 TABLET ORAL EVERY 6 HOURS PRN
Status: DISCONTINUED | OUTPATIENT
Start: 2022-06-10 | End: 2022-06-11 | Stop reason: HOSPADM

## 2022-06-10 RX ORDER — CALCIUM CARBONATE 200(500)MG
1000 TABLET,CHEWABLE ORAL DAILY PRN
Status: DISCONTINUED | OUTPATIENT
Start: 2022-06-10 | End: 2022-06-11 | Stop reason: HOSPADM

## 2022-06-10 RX ORDER — ASPIRIN 81 MG/1
81 TABLET, CHEWABLE ORAL DAILY
Status: DISCONTINUED | OUTPATIENT
Start: 2022-06-11 | End: 2022-06-11 | Stop reason: HOSPADM

## 2022-06-10 RX ORDER — HEPARIN SODIUM 5000 [USP'U]/ML
5000 INJECTION, SOLUTION INTRAVENOUS; SUBCUTANEOUS EVERY 8 HOURS SCHEDULED
Status: DISCONTINUED | OUTPATIENT
Start: 2022-06-10 | End: 2022-06-11 | Stop reason: HOSPADM

## 2022-06-10 RX ADMIN — HEPARIN SODIUM 5000 UNITS: 5000 INJECTION INTRAVENOUS; SUBCUTANEOUS at 22:05

## 2022-06-10 RX ADMIN — INSULIN LISPRO 2 UNITS: 100 INJECTION, SOLUTION INTRAVENOUS; SUBCUTANEOUS at 22:05

## 2022-06-10 RX ADMIN — ATORVASTATIN CALCIUM 40 MG: 40 TABLET, FILM COATED ORAL at 17:03

## 2022-06-10 RX ADMIN — DOXERCALCIFEROL 7 MCG: 4 INJECTION, SOLUTION INTRAVENOUS at 14:48

## 2022-06-10 RX ADMIN — CARVEDILOL 25 MG: 12.5 TABLET, FILM COATED ORAL at 17:03

## 2022-06-10 NOTE — ED PROVIDER NOTES
0   History  Chief Complaint   Patient presents with    Weakness - Generalized     Patient stated that he woke two hours ago and had started to have generalized weakness  He is due for dialysis today, last dialysis appt was Wednesday  He started zoloft 12 5 mg as well yesterday  Last week had an angiogram last week in [place] as well  He mainly complains of headache, and bodyaches  Denies NVD     Wilder Robbins is a 20-year-old male with PMH of CAD, end-stage renal disease, diabetes mellitus that presents to the emergency department with feelings of fatigue, intermittent chest pain that he describes as sharp and that comes on 2-3 times daily and last roughly half an hour each day, and mild lightheadedness  He also reports 1 episode of vomiting this morning but does not feel nauseated currently  He also feels somewhat dizzy  Son, who is in the room, states that he was seen 3 days ago for cardiac stent  The patient was here on May 2nd with MI at which time stents were placed, but was told that there was an additional vessel that could not be stented  They went to [facility] where additional stents were placed and plans for more stents in the future on June 23rd  Additionally the patient was supposed to have dialysis today but came to the emergency department due to his symptoms a did not receive dialysis  His last dialysis was 2 days ago  The patient denies abdominal pain, bowel or bladder symptoms, fevers, or loss of consciousness  Prior to Admission Medications   Prescriptions Last Dose Informant Patient Reported? Taking?    BD Pen Needle Adina U/F 32G X 4 MM MISC  Spouse/Significant Other No No   Sig: USE TO INJECT INSULIN 4 TIMES DAILY   Blood Glucose Monitoring Suppl (True Metrix Meter) w/Device KIT  Spouse/Significant Other No No   Sig: Use to test blood sugars 3 times daily   Blood Pressure Monitoring (BLOOD PRESSURE CUFF) MISC  Spouse/Significant Other No No   Sig: Use to check blood pressure before taking blood pressure medication and 1 hour after and follow instructions provided in discharge instructions based on the readings  Cholecalciferol (Vitamin D3) 1 25 MG (27331 UT) CAPS   No No   Sig: TAKE 1 CAPSULE BY MOUTH ONE TIME PER WEEK   Continuous Blood Gluc  (DEXCOM G6 ) LANCE  Spouse/Significant Other No No   Sig: Use as directed for continuous glucose monitoring   Continuous Blood Gluc Sensor (DEXCOM G6 SENSOR) MISC  Spouse/Significant Other No No   Sig: Use as directed for continuous glucose monitoring  Change every 10 days   Continuous Blood Gluc Transmit (DEXCOM G6 TRANSMITTER) MISC  Spouse/Significant Other No No   Sig: Use as directed for continuous glucose monitoring-Change every 3 months   Incontinence Supplies (MALE URINAL) MISC  Spouse/Significant Other No No   Sig: by Does not apply route daily   Patient not taking: No sig reported   Insulin Syringe-Needle U-100 (B-D INS SYRINGE 0 5CC/30GX1/2") 30G X 1/2" 0 5 ML MISC  Spouse/Significant Other No No   Sig: Inject under the skin 4 (four) times a day   Lancets Ultra Thin 30G MISC  Spouse/Significant Other No No   Sig: Use 3 times a day   Lantus 100 UNIT/ML subcutaneous injection  Spouse/Significant Other No No   Sig: INJECT 14 UNITS UNDER THE SKIN DAILY   Nutritional Supplements (VITAMIN D BOOSTER PO)  Spouse/Significant Other Yes No   Sig: Take 0 5 mcg by mouth     ONETOUCH DELICA LANCETS 70K MISC  Spouse/Significant Other No No   Sig: by Does not apply route 3 (three) times a day   Sevelamer Carbonate 2 4 g PACK  Spouse/Significant Other Yes No   Sig: Take 1 packet by mouth Three times a day   TORSEMIDE PO  Spouse/Significant Other Yes No   Sig: Take 50 mg by mouth Pt takes 50 mg daily on non- dialysis days: sat, sun, Tues, and thrusday  Pt takes 10 mg of torsemide daily on dialysis days m- w- f      True Metrix Blood Glucose Test test strip   No No   Sig: USE 1 EACH 3 (THREE) TIMES A DAY USE AS INSTRUCTED aluminum-magnesium hydroxide-simethicone (MYLANTA) 200-200-20 mg/5 mL suspension  Spouse/Significant Other No No   Sig: Take 30 mL by mouth every 4 (four) hours as needed for indigestion or heartburn   aspirin (ECOTRIN LOW STRENGTH) 81 mg EC tablet  Spouse/Significant Other Yes No   Sig: Take 81 mg by mouth daily Resume on      atorvastatin (LIPITOR) 40 mg tablet   No No   Sig: TAKE 1 TABLET BY MOUTH DAILY WITH DINNER   calcium acetate (CALPHRON) 667 mg  Spouse/Significant Other Yes No   Sig: TAKE 2 TABLETS BY MOUTH THREE TIMES DAILY WITH MEALS   calcium carbonate (TUMS) 500 mg chewable tablet  Spouse/Significant Other No No   Sig: Chew 1 tablet (500 mg total) daily as needed for indigestion or heartburn   carvedilol (COREG) 12 5 mg tablet   No No   Sig: TAKE 1 TABLET BY MOUTH TWICE A DAY WITH FOOD   clopidogrel (PLAVIX) 75 mg tablet   No No   Sig: Take 1 tablet (75 mg total) by mouth in the morning  clopidogrel (Plavix) 75 mg tablet   No No   Sig: Take 1 tablet (75 mg total) by mouth in the morning     doxercalciferol (HECTOROL) 0 5 mcg capsule  Spouse/Significant Other Yes No   Si mcg   Patient not taking: No sig reported   insulin lispro (HumaLOG KwikPen) 100 units/mL injection pen  Spouse/Significant Other No No   Sig: INJECT 4 UNITS 3 TIMES A DAY WITH MEALS PLUS SCALE (max daily dose 42 units)   meclizine (ANTIVERT) 25 mg tablet  Spouse/Significant Other No No   Sig: Take 1 tablet (25 mg total) by mouth every 8 (eight) hours as needed for dizziness or nausea   sertraline (Zoloft) 25 mg tablet   No No   Sig: Take 1/2 tablet every day at bedtime for 4 days, then increase to 1 tablet every day at bedtime      Facility-Administered Medications: None       Past Medical History:   Diagnosis Date    Cerebrovascular accident (CVA) due to thrombosis of left middle cerebral artery [facility] ) 2018    Chronic kidney disease     Diabetes mellitus (HCC)     GERD (gastroesophageal reflux disease)     Hypercholesteremia     Hyperlipidemia     Hypertension     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    Neuropathy     Obesity     Osteomyelitis ([address] )     last assessed 11/4/16    PVC's (premature ventricular contractions)     sees cardiology Dr Jesisca camargo    Stroke [facility])     last weeof July 2018 [facility]    TIA (transient ischemic attack) 10/28/2018       Past Surgical History:   Procedure Laterality Date    ABDOMINAL SURGERY      CARDIAC CATHETERIZATION N/A 5/2/2022    Procedure: Cardiac Coronary Angiogram;  Surgeon: Alejandra Emanuel MD;  Location[facility] CARDIAC CATH LAB; Service: Cardiology    CARDIAC CATHETERIZATION N/A 5/2/2022    Procedure: Cardiac pci;  Surgeon: Alejandra Emanuel MD;  [facility] CARD[facility]AC [facility]ATH [facility]; Service: Cardiology    CHOLECYSTECTOMY      Percutaneous    COLONOSCOPY      CYSTOSCOPY      OTHER SURGICAL HISTORY      "stimulator to control bowel movements"    FL ESOPHAGOGASTRODUODENOSCOPY TRANSORAL DIAGNOSTIC N/A 9/27/2016    Procedure: ESOPHAGOGASTRODUODENOSCOPY (EGD); Surgeon: Thalia Dunham MD;  Location: [facility] GI LAB; Service: Gastroenterology    FL LAP,CHOLECYSTECTOMY N/A 2/29/2016    Procedure: LAPAROSCOPIC CHOLECYSTECTOMY ;  Surgeon: Chevy Bright DO;  Location: [facility] Main OR;  Service: General    ROTATOR CUFF REPAIR Right     TOE AMPUTATION Right 10/28/2016    Procedure: 3RD TOE AMPUTATION ;  Surgeon: Jaden Gamino DPM;  Location: [facility] Main OR;  Service:        Family History   Problem Relation Age of Onset    Leukemia Mother     Liver disease Mother     Lung cancer Mother         heavy smoker - 3 ppd    Heart disease Father     Liver disease Father     Multiple myeloma Sister     Breast cancer Sister     Urolithiasis Family     Alcohol abuse Neg Hx     Depression Neg Hx     Drug abuse Neg Hx     Substance Abuse Neg Hx     Mental illness Neg Hx      I have reviewed and agree with the history as documented      E-Cigarette/Vaping    E-Cigarette Use Never User      E-Cigarette/Vaping Substances    Nicotine No     THC No     CBD No     Flavoring No     Other No     Unknown No      Social History     Tobacco Use    Smoking status: Never Smoker    Smokeless tobacco: Never Used   Vaping Use    Vaping Use: Never used   Substance Use Topics    Alcohol use: Not Currently    Drug use: No        Review of Systems   Constitutional: Positive for fatigue  Negative for chills and fever  HENT: Negative for ear pain and sore throat  Eyes: Negative for pain and visual disturbance  Respiratory: Negative for cough and shortness of breath  Cardiovascular: Positive for chest pain  Negative for palpitations  Gastrointestinal: Positive for abdominal pain and vomiting  Negative for constipation, diarrhea and nausea  Genitourinary: Negative for dysuria and hematuria  Musculoskeletal: Negative for arthralgias and back pain  Skin: Negative for color change and rash  Bruising on the right hip from cardiac catheterization procedure   Neurological: Positive for dizziness and headaches  Negative for seizures and syncope  All other systems reviewed and are negative  Physical Exam  ED Triage Vitals   Temperature Pulse Respirations Blood Pressure SpO2   06/10/22 1011 06/10/22 1011 06/10/22 1011 06/10/22 1011 06/10/22 1011   98 7 °F (37 1 °C) 70 16 151/94 97 %      Temp Source Heart Rate Source Patient Position - Orthostatic VS BP Location FiO2 (%)   06/10/22 1315 06/10/22 1011 06/10/22 1011 06/10/22 1011 --   Oral Monitor Sitting Right arm       Pain Score       06/10/22 1027       6             Orthostatic Vital Signs  Vitals:    06/10/22 1500 06/10/22 1530 06/10/22 1540 06/10/22 1621   BP: 131/69 130/70 128/78 (!) 175/76   Pulse: 83 83 79 83   Patient Position - Orthostatic VS: Lying Lying Lying Sitting       Physical Exam  Vitals and nursing note reviewed  Constitutional:       General: He is not in acute distress       Appearance: He is well-developed  He is not ill-appearing  HENT:      Head: Normocephalic and atraumatic  Right Ear: External ear normal       Left Ear: External ear normal       Mouth/Throat:      Mouth: Mucous membranes are moist       Pharynx: Oropharynx is clear  No oropharyngeal exudate or posterior oropharyngeal erythema  Eyes:      General: No scleral icterus  Right eye: No discharge  Left eye: No discharge  Extraocular Movements: Extraocular movements intact  Conjunctiva/sclera: Conjunctivae normal    Cardiovascular:      Rate and Rhythm: Normal rate and regular rhythm  Pulses: Normal pulses  Heart sounds: Normal heart sounds  No murmur heard  Pulmonary:      Effort: Pulmonary effort is normal  No respiratory distress  Breath sounds: Normal breath sounds  No wheezing, rhonchi or rales  Abdominal:      General: Abdomen is flat  Palpations: Abdomen is soft  Tenderness: There is no abdominal tenderness  There is no guarding or rebound  Musculoskeletal:         General: No swelling, tenderness or deformity  Normal range of motion  Cervical back: Normal range of motion and neck supple  Right lower leg: No edema  Left lower leg: No edema  Skin:     General: Skin is warm and dry  Capillary Refill: Capillary refill takes less than 2 seconds  Findings: Bruising (Right hip from cardiac catheterization procedure) present  Neurological:      Mental Status: He is alert and oriented to person, place, and time           ED Medications  Medications   acetaminophen (TYLENOL) tablet 650 mg (has no administration in time range)   calcium carbonate (TUMS) chewable tablet 1,000 mg (has no administration in time range)   docusate sodium (COLACE) capsule 100 mg (has no administration in time range)   senna (SENOKOT) tablet 8 6 mg (has no administration in time range)   ondansetron (ZOFRAN) injection 4 mg (has no administration in time range)   heparin (porcine) subcutaneous injection 5,000 Units (has no administration in time range)   atorvastatin (LIPITOR) tablet 40 mg (has no administration in time range)   clopidogrel (PLAVIX) tablet 75 mg (has no administration in time range)   insulin lispro (HumaLOG) 100 units/mL subcutaneous injection 1-6 Units (1 Units Subcutaneous Not Given 6/10/22 1438)   insulin lispro (HumaLOG) 100 units/mL subcutaneous injection 1-6 Units (has no administration in time range)   cinacalcet (SENSIPAR) tablet 30 mg (has no administration in time range)   doxercalciferol (HECTOROL) injection 7 mcg (7 mcg Intravenous Given 6/10/22 1448)   iron sucrose (VENOFER) 100 mg in sodium chloride 0 9 % 100 mL IVPB (has no administration in time range)   aspirin chewable tablet 81 mg (has no administration in time range)   isosorbide mononitrate (IMDUR) 24 hr tablet 30 mg (has no administration in time range)   carvedilol (COREG) tablet 25 mg (has no administration in time range)   nitroglycerin (NITROSTAT) SL tablet 0 4 mg (has no administration in time range)       Diagnostic Studies  Results Reviewed     Procedure Component Value Units Date/Time    HS Troponin I 4hr [[id number]]  (Abnormal) Collected: 06/10/22 1545    Lab Status: Final result Specimen: Blood from Arm, Right Updated: 06/10/22 1614     hs TnI 4hr 414 ng/L      Delta 4hr hsTnI -48 ng/L     HS Troponin I 2hr [[id number]]  (Abnormal) Collected: 06/10/22 1336    Lab Status: Final result Specimen: Blood from Arm, Left Updated: 06/10/22 1415     hs TnI 2hr 440 ng/L      Delta 2hr hsTnI -22 ng/L     Fingerstick Glucose (POCT) [[id number]]  (Normal) Collected: 06/10/22 1408    Lab Status: Final result Updated: 06/10/22 1410     POC Glucose 113 mg/dl     APTT six (6) hours after Heparin bolus/drip initiation or dosing change [[id number]]  (Normal) Collected: 06/10/22 1236    Lab Status: Final result Specimen: Blood from Arm, Right Updated: 06/10/22 1311     PTT 34 seconds     Protime-INR [[id number]]  (Normal) Collected: 06/10/22 1236    Lab Status: Final result Specimen: Blood from Arm, Right Updated: 06/10/22 1311     Protime 13 3 seconds      INR 1 01    HS Troponin 0hr (reflex protocol) [[id number]]  (Abnormal) Collected: 06/10/22 1103    Lab Status: Final result Specimen: Blood from Arm, Right Updated: 06/10/22 1132     hs TnI 0hr 462 ng/L     Comprehensive metabolic panel [[id number]]  (Abnormal) Collected: 06/10/22 1103    Lab Status: Final result Specimen: Blood from Arm, Right Updated: 06/10/22 1129     Sodium 136 mmol/L      Potassium 4 9 mmol/L      Chloride 102 mmol/L      CO2 25 mmol/L      ANION GAP 9 mmol/L      BUN 47 mg/dL      Creatinine 7 11 mg/dL      Glucose 106 mg/dL      Calcium 9 1 mg/dL      AST 14 U/L      ALT 13 U/L      Alkaline Phosphatase 85 U/L      Total Protein 7 1 g/dL      Albumin 4 0 g/dL      Total Bilirubin 0 48 mg/dL      eGFR 7 ml/min/1 73sq m     Narrative:      [facility] guidelines for Chronic Kidney Disease (CKD):     Stage 1 with normal or high GFR (GFR > 90 mL/min/1 73 square meters)    Stage 2 Mild CKD (GFR = 60-89 mL/min/1 73 square meters)    Stage 3A Moderate CKD (GFR = 45-59 mL/min/1 73 square meters)    Stage 3B Moderate CKD (GFR = 30-44 mL/min/1 73 square meters)    Stage 4 Severe CKD (GFR = 15-29 mL/min/1 73 square meters)    Stage 5 End Stage CKD (GFR <15 mL/min/1 73 square meters)  Note: GFR calculation is accurate only with a steady state creatinine    Lipase [[id number]]  (Normal) Collected: 06/10/22 1103    Lab Status: Final result Specimen: Blood from Arm, Right Updated: 06/10/22 1129     Lipase 24 u/L     CBC and differential [[id number]]  (Abnormal) Collected: 06/10/22 1103    Lab Status: Final result Specimen: Blood from Arm, Right Updated: 06/10/22 1115     WBC 5 85 Thousand/uL      RBC 3 63 Million/uL      Hemoglobin 11 1 g/dL      Hematocrit 35 5 %      MCV 98 fL      MCH 30 6 pg      MCHC 31 3 g/dL      RDW 15 1 %      MPV 10 1 fL      Platelets 594 Thousands/uL      nRBC 0 /100 WBCs      Neutrophils Relative 62 %      Immat GRANS % 1 %      Lymphocytes Relative 22 %      Monocytes Relative 12 %      Eosinophils Relative 3 %      Basophils Relative 0 %      Neutrophils Absolute 3 63 Thousands/µL      Immature Grans Absolute 0 05 Thousand/uL      Lymphocytes Absolute 1 30 Thousands/µL      Monocytes Absolute 0 69 Thousand/µL      Eosinophils Absolute 0 16 Thousand/µL      Basophils Absolute 0 02 Thousands/µL                  X-ray chest 1 view portable   Final Result by Jose Angel Torres MD (06/10 5698)      Mild cardiomegaly  Mild vascular congestion  Workstation performed: [id number]               Procedures  ECG 12 Lead Documentation Only    Date/Time: 6/10/2022 12:31 PM  Performed by: Daisha Cortez DO  Authorized by: Daisha Cortez DO     Indications / Diagnosis:  Chest pain  ECG reviewed by me, the ED Provider: yes    Patient location:  ED  Previous ECG:     Previous ECG:  Compared to current    Similarity:  No change  Interpretation:     Interpretation: abnormal    Rate:     ECG rate:  70    ECG rate assessment: normal    Rhythm:     Rhythm: sinus rhythm    Ectopy:     Ectopy: none    QRS:     QRS axis:  Normal    QRS intervals:  Normal  Conduction:     Conduction: abnormal      Abnormal conduction: complete RBBB    ST segments:     ST segments:  Normal  T waves:     T waves: inverted      Inverted:  II, III and aVF  Comments:      RBBB  Normal sinus rhythm  No evidence of acute ischemia or dysrhythmia            ED Course                             SBIRT 20yo+    Flowsheet Row Most Recent Value   SBIRT (25 yo +)    In order to provide better care to our patients, we are screening all of our patients for alcohol and drug use  Would it be okay to ask you these screening questions?  Unable to answer at this time Filed at: 06/10/2022 1027                [facility]  Number of Diagnoses or Management Options  Cardiac chest pain  Dizziness  Elevated troponin  ESRD on dialysis [facility])  Diagnosis management comments: 62M with extensive ACS history and stent placement 3 days ago presents the emergency department with intermittent chest pain, 1 episode of vomiting this morning, and malaise over the last 2 days  Workup in the emergency department includes CBC, CMP, lipase, coags all of which are within normal limits  HS troponin is elevated to 462  Chest x-ray reveals mild cardiomegaly and mild vascular congestion  In the presence of chest pain and elevated troponin the patient is discussed with cardiology who advised the patient be admitted under the care of [facility] and that they will come to see the patient  The patient is admitted  Disposition  Final diagnoses:   ESRD on dialysis [facility])   Elevated troponin   Dizziness   Cardiac chest pain     Time reflects when diagnosis was documented in both MDM as applicable and the Disposition within this note     Time User Action Codes Description Comment    6/10/2022 12:13 PM Bevelyn Lean Add [N18 6,  Z99 2] ESRD on dialysis ([facility] )     6/10/2022 12:19 PM Ligia Mc [R77 8] Elevated troponin     6/10/2022 12:19 PM Ligia Mc [R42] Dizziness     6/10/2022  1:55 PM Bevelyn Lean Add [R07 9] Cardiac chest pain     6/10/2022  1:55 PM Chaparrita Blend [Q45 0,  Z99 2] ESRD on dialysis ([facility] )     6/10/2022  1:55 PM Bevelyn Lean Modify [R07 9] Cardiac chest pain       ED Disposition     ED Disposition   Admit    Condition   Stable    Date/Time   Fri Misael 10, 2022 12:51 PM    Comment   Case was discussed with MICKEY and the patient's admission status was agreed to be Admission Status: inpatient status to the service of Dr Vasu Romero             Follow-up Information    None         Current Discharge Medication List      CONTINUE these medications which have NOT CHANGED    Details   aluminum-magnesium hydroxide-simethicone (MYLANTA) 200-200-20 mg/5 mL suspension Take 30 mL by mouth every 4 (four) hours as needed for indigestion or heartburn  Qty: 355 mL, Refills: 0    Associated Diagnoses: Gastroesophageal reflux disease without esophagitis      aspirin (ECOTRIN LOW STRENGTH) 81 mg EC tablet Take 81 mg by mouth daily Resume on 8/14        atorvastatin (LIPITOR) 40 mg tablet TAKE 1 TABLET BY MOUTH DAILY WITH DINNER  Qty: 90 tablet, Refills: 1    Comments: E78 2  Associated Diagnoses: Mixed hyperlipidemia      BD Pen Needle Adina U/F 32G X 4 MM MISC USE TO INJECT INSULIN 4 TIMES DAILY  Qty: 400 each, Refills: 1    Comments: DX Code Needed    Associated Diagnoses: Type 2 diabetes mellitus with hyperglycemia, with long-term current use of insulin ([facility])      Blood Glucose Monitoring Suppl (True Metrix Meter) w/Device KIT Use to test blood sugars 3 times daily  Qty: 1 kit, Refills: 0    Comments: Dx: E11 65  Associated Diagnoses: Type 2 diabetes mellitus with hyperglycemia, with long-term current use of insulin ([facility])      Blood Pressure Monitoring (BLOOD PRESSURE CUFF) MISC Use to check blood pressure before taking blood pressure medication and 1 hour after and follow instructions provided in discharge instructions based on the readings    Qty: 1 each, Refills: 0    Associated Diagnoses: Essential hypertension      calcium acetate (CALPHRON) 667 mg TAKE 2 TABLETS BY MOUTH THREE TIMES DAILY WITH MEALS      calcium carbonate (TUMS) 500 mg chewable tablet Chew 1 tablet (500 mg total) daily as needed for indigestion or heartburn  Refills: 0    Associated Diagnoses: Gastroesophageal reflux disease without esophagitis      carvedilol (COREG) 12 5 mg tablet TAKE 1 TABLET BY MOUTH TWICE A DAY WITH FOOD  Qty: 180 tablet, Refills: 1    Associated Diagnoses: Dizziness; Elevated troponin; Essential hypertension      Cholecalciferol (Vitamin D3) 1 25 MG (94193 UT) CAPS TAKE 1 CAPSULE BY MOUTH ONE TIME PER WEEK  Qty: 12 capsule, Refills: 2    Associated Diagnoses: Vitamin D deficiency      !! clopidogrel (PLAVIX) 75 mg tablet Take 1 tablet (75 mg total) by mouth in the morning  Qty: 90 tablet, Refills: 2    Associated Diagnoses: Chest pain      !! clopidogrel (Plavix) 75 mg tablet Take 1 tablet (75 mg total) by mouth in the morning  Qty: 90 tablet, Refills: 3    Associated Diagnoses: Coronary artery disease due to lipid rich plaque      Continuous Blood Gluc  (DEXCOM G6 ) LANCE Use as directed for continuous glucose monitoring  Qty: 1 Device, Refills: 0    Associated Diagnoses: Type 2 diabetes mellitus with hyperglycemia, with long-term current use of insulin (HCC)      Continuous Blood Gluc Sensor (DEXCOM G6 SENSOR) MISC Use as directed for continuous glucose monitoring  Change every 10 days  Qty: 1 each, Refills: 11    Comments: Please dispense 1-3 pack of sensors 11R  Associated Diagnoses: Type 2 diabetes mellitus with hyperglycemia, with long-term current use of insulin (HCC)      Continuous Blood Gluc Transmit (DEXCOM G6 TRANSMITTER) MISC Use as directed for continuous glucose monitoring-Change every 3 months  Qty: 1 each, Refills: 3    Associated Diagnoses: Type 2 diabetes mellitus with hyperglycemia, with long-term current use of insulin (HCC)      doxercalciferol (HECTOROL) 0 5 mcg capsule 4 mcg      Incontinence Supplies (MALE URINAL) MISC by Does not apply route daily  Qty: 6 each, Refills: 3    Associated Diagnoses: History of ischemic cerebrovascular accident (CVA) with residual deficit; Diabetic polyneuropathy associated with type 2 diabetes mellitus ([address] ); Urine troubles      insulin lispro (HumaLOG KwikPen) 100 units/mL injection pen INJECT 4 UNITS 3 TIMES A DAY WITH MEALS PLUS SCALE (max daily dose 42 units)  Qty: 45 mL, Refills: 1    Comments: DX Code Needed      Associated Diagnoses: Type 2 diabetes mellitus with hyperglycemia, with long-term current use of insulin (HCC)      Insulin Syringe-Needle U-100 (B-D INS SYRINGE 0 5CC/30GX1/2") 30G X 1/2" 0 5 ML MISC Inject under the skin 4 (four) times a day  Qty: 360 each, Refills: 1    Associated Diagnoses: Type 2 diabetes mellitus with hyperglycemia, unspecified whether long term insulin use ([facility] )      ! ! Lancets Ultra Thin 30G MISC Use 3 times a day  Qty: 300 each, Refills: 0    Associated Diagnoses: Type 2 diabetes mellitus with chronic kidney disease on chronic dialysis, with long-term current use of insulin ([facility])      Lantus 100 UNIT/ML subcutaneous injection INJECT 14 UNITS UNDER THE SKIN DAILY  Qty: 10 mL, Refills: 0    Associated Diagnoses: Type 2 diabetes mellitus with hyperglycemia, with long-term current use of insulin ([facility])      meclizine (ANTIVERT) 25 mg tablet Take 1 tablet (25 mg total) by mouth every 8 (eight) hours as needed for dizziness or nausea  Qty: 30 tablet, Refills: 0    Associated Diagnoses: Dizziness      Nutritional Supplements (VITAMIN D BOOSTER PO) Take 0 5 mcg by mouth        !! ONETOUCH DELICA LANCETS 66Z MISC by Does not apply route 3 (three) times a day  Qty: 270 each, Refills: 1    Associated Diagnoses: Type 2 diabetes mellitus with hypoglycemia without coma, with long-term current use of insulin ([facility])      sertraline (Zoloft) 25 mg tablet Take 1/2 tablet every day at bedtime for 4 days, then increase to 1 tablet every day at bedtime  Qty: 30 tablet, Refills: 1    Associated Diagnoses: Anxiety associated with depression      Sevelamer Carbonate 2 4 g PACK Take 1 packet by mouth Three times a day      TORSEMIDE PO Take 50 mg by mouth Pt takes 50 mg daily on non- dialysis days: sat, sun, Tues, and thrusday  Pt takes 10 mg of torsemide daily on dialysis days m- w- f  True Metrix Blood Glucose Test test strip USE 1 EACH 3 (THREE) TIMES A DAY USE AS INSTRUCTED  Qty: 100 strip, Refills: 5    Associated Diagnoses: Type 2 diabetes mellitus with hyperglycemia, with long-term current use of insulin ([facility] )       ! ! - Potential duplicate medications found   Please discuss with provider  No discharge procedures on file  PDMP Review       Value Time User    PDMP Reviewed  Yes 12/30/2020 10:40 PM Manju Ervin MD           ED Provider  Attending physically available and evaluated Yeison Mitchell GUERLINE managed the patient along with the ED Attending      Electronically Signed by         Twan Veliz DO  06/10/22 [id number]

## 2022-06-10 NOTE — ASSESSMENT & PLAN NOTE
· Could be better controlled  · Exacerbated by fluid overload from missed HD session  · Resume home meds  · Monitor blood pressure

## 2022-06-10 NOTE — ED ATTENDING ATTESTATION
6/10/2022  IShara DO, saw and evaluated the patient  I have discussed the patient with the resident/non-physician practitioner and agree with the resident's/non-physician practitioner's findings, Plan of Care, and MDM as documented in the resident's/non-physician practitioner's note, except where noted  All available labs and Radiology studies were reviewed  I was present for key portions of any procedure(s) performed by the resident/non-physician practitioner and I was immediately available to provide assistance  At this point I agree with the current assessment done in the Emergency Department  I have conducted an independent evaluation of this patient a history and physical is as follows:      80-year-old male, end-stage renal disease on dialysis, presents with chest pain  , chest pain has been intermittent past 3 days, recent angioplasty, says pain does not feel like pain he was experiencing prior to the angioplasty  , no falls no injury no trauma, no overlying skin changes  , no associated shortness of breath that is worse than baseline says he has chronic shortness of breath but this is unchanged  Was recently started on Zoloft  Review of Systems   Constitutional: Negative for activity change, chills, diaphoresis and fever  HENT: Negative for congestion, sinus pressure and sore throat  Eyes: Negative for pain and visual disturbance  Respiratory: Negative for cough, chest tightness, shortness of breath, wheezing and stridor  Cardiovascular:  Positive  for chest pain and palpitations  Gastrointestinal: Negative for abdominal distention, abdominal pain, constipation, diarrhea, nausea and vomiting  Genitourinary: Negative for dysuria and frequency  Musculoskeletal: Negative for neck pain and neck stiffness  Skin: Negative for rash  Neurological: Negative for dizziness, speech difficulty, light-headedness, numbness and headaches  Physical Exam  Vitals reviewed  Constitutional:       General: He is not in acute distress  Appearance: He is well-developed  He is not diaphoretic  HENT:      Head: Normocephalic and atraumatic  Right Ear: External ear normal       Left Ear: External ear normal       Nose: Nose normal    Eyes:      General:         Right eye: No discharge  Left eye: No discharge  Pupils: Pupils are equal, round, and reactive to light  Neck:      Trachea: No tracheal deviation  Cardiovascular:      Rate and Rhythm: Normal rate and regular rhythm  Heart sounds: Normal heart sounds  No murmur heard  Pulmonary:      Effort: Pulmonary effort is normal  No respiratory distress  Breath sounds: Normal breath sounds  No stridor  Abdominal:      General: There is no distension  Palpations: Abdomen is soft  Tenderness: There is no abdominal tenderness  There is no guarding or rebound  Musculoskeletal:         General: Normal range of motion  Cervical back: Normal range of motion and neck supple  Skin:     General: Skin is warm and dry  Coloration: Skin is not pale  Findings: No erythema  Neurological:      General: No focal deficit present  Mental Status: He is alert and oriented to person, place, and time               ED Course         Critical Care Time  Procedures        Labs Reviewed   CBC AND DIFFERENTIAL - Abnormal       Result Value Ref Range Status    WBC 5 85  4 31 - 10 16 Thousand/uL Final    RBC 3 63 (*) 3 88 - 5 62 Million/uL Final    Hemoglobin 11 1 (*) 12 0 - 17 0 g/dL Final    Hematocrit 35 5 (*) 36 5 - 49 3 % Final    MCV 98  82 - 98 fL Final    MCH 30 6  26 8 - 34 3 pg Final    MCHC 31 3 (*) 31 4 - 37 4 g/dL Final    RDW 15 1  11 6 - 15 1 % Final    MPV 10 1  8 9 - 12 7 fL Final    Platelets 896 (*) 649 - 390 Thousands/uL Final    nRBC 0  /100 WBCs Final    Neutrophils Relative 62  43 - 75 % Final    Immat GRANS % 1  0 - 2 % Final    Lymphocytes Relative 22  14 - 44 % Final Monocytes Relative 12  4 - 12 % Final    Eosinophils Relative 3  0 - 6 % Final    Basophils Relative 0  0 - 1 % Final    Neutrophils Absolute 3 63  1 85 - 7 62 Thousands/µL Final    Immature Grans Absolute 0 05  0 00 - 0 20 Thousand/uL Final    Lymphocytes Absolute 1 30  0 60 - 4 47 Thousands/µL Final    Monocytes Absolute 0 69  0 17 - 1 22 Thousand/µL Final    Eosinophils Absolute 0 16  0 00 - 0 61 Thousand/µL Final    Basophils Absolute 0 02  0 00 - 0 10 Thousands/µL Final   COMPREHENSIVE METABOLIC PANEL - Abnormal    Sodium 136  135 - 147 mmol/L Final    Potassium 4 9  3 5 - 5 3 mmol/L Final    Chloride 102  96 - 108 mmol/L Final    CO2 25  21 - 32 mmol/L Final    ANION GAP 9  4 - 13 mmol/L Final    BUN 47 (*) 5 - 25 mg/dL Final    Creatinine 7 11 (*) 0 60 - 1 30 mg/dL Final    Comment: Standardized to IDMS reference method    Glucose 106  65 - 140 mg/dL Final    Comment: If the patient is fasting, the ADA then defines impaired fasting glucose as > 100 mg/dL and diabetes as > or equal to 123 mg/dL  Specimen collection should occur prior to Sulfasalazine administration due to the potential for falsely depressed results  Specimen collection should occur prior to Sulfapyridine administration due to the potential for falsely elevated results  Calcium 9 1  8 4 - 10 2 mg/dL Final    AST 14  13 - 39 U/L Final    Comment: Specimen collection should occur prior to Sulfasalazine administration due to the potential for falsely depressed results  ALT 13  7 - 52 U/L Final    Comment: Specimen collection should occur prior to Sulfasalazine administration due to the potential for falsely depressed results       Alkaline Phosphatase 85  34 - 104 U/L Final    Total Protein 7 1  6 4 - 8 4 g/dL Final    Albumin 4 0  3 5 - 5 0 g/dL Final    Total Bilirubin 0 48  0 20 - 1 00 mg/dL Final    eGFR 7  ml/min/1 73sq m Final    Narrative:     [place] guidelines for Chronic Kidney Disease (CKD):     Stage 1 with normal or high GFR (GFR > 90 mL/min/1 73 square meters)    Stage 2 Mild CKD (GFR = 60-89 mL/min/1 73 square meters)    Stage 3A Moderate CKD (GFR = 45-59 mL/min/1 73 square meters)    Stage 3B Moderate CKD (GFR = 30-44 mL/min/1 73 square meters)    Stage 4 Severe CKD (GFR = 15-29 mL/min/1 73 square meters)    Stage 5 End Stage CKD (GFR <15 mL/min/1 73 square meters)  Note: GFR calculation is accurate only with a steady state creatinine   HS TROPONIN I 0HR - Abnormal    hs TnI 0hr 462 (*) "Refer to ACS Flowchart"- see link ng/L Final    Comment:                                              Initial (time 0) result  If >=50 ng/L, Myocardial injury suggested ;  Type of myocardial injury and treatment strategy  to be determined  If 5-49 ng/L, a delta result at 2 hours and or 4 hours will be needed to further evaluate  If <4 ng/L, and chest pain has been >3 hours since onset, patient may qualify for discharge based on the HEART score in the ED  If <5 ng/L and <3hours since onset of chest pain, a delta result at 2 hours will be needed to further evaluate  HS Troponin 99th Percentile URL of a Health Population=12 ng/L with a 95% Confidence Interval of 8-18 ng/L  Second Troponin (time 2 hours)  If calculated delta >= 20 ng/L,  Myocardial injury suggested ; Type of myocardial injury and treatment strategy to be determined  If 5-49 ng/L and the calculated delta is 5-19 ng/L, consult medical service for evaluation  Continue evaluation for ischemia on ecg and other possible etiology and repeat hs troponin at 4 hours  If delta is <5 ng/L at 2 hours, consider discharge based on risk stratification via the HEART score (if in ED), or JALIL risk score in IP/Observation  HS Troponin 99th Percentile URL of a Health Population=12 ng/L with a 95% Confidence Interval of 8-18 ng/L     LIPASE - Normal    Lipase 24  11 - 82 u/L Final   APTT - Normal    PTT 34  23 - 37 seconds Final    Comment: Therapeutic Heparin Range =  60-90 seconds   PROTIME-INR - Normal    Protime 13 3  11 6 - 14 5 seconds Final    INR 1 01  0 84 - 1 19 Final   HS TROPONIN I 2HR   HS TROPONIN I 4HR         MDM  Number of Diagnoses or Management Options  Cardiac chest pain: new, needed workup  Dizziness: new, needed workup  Elevated troponin: new, needed workup  ESRD on dialysis [facility]): new, needed workup     Amount and/or Complexity of Data Reviewed  Clinical lab tests: ordered and reviewed  Tests in the radiology section of CPT®: ordered and reviewed  Review and summarize past medical records: yes  Discuss the patient with other providers: yes  Independent visualization of images, tracings, or specimens: yes          Time reflects when diagnosis was documented in both MDM as applicable and the Disposition within this note     Time User Action Codes Description Comment    6/10/2022 12:13 PM Floy Drafts Add [N18 6,  Z99 2] ESRD on dialysis ([facility] )     6/10/2022 12:19 PM [place] Holter [R77 8] Elevated troponin     6/10/2022 12:19 PM Shana Vincent Add [R42] Dizziness     6/10/2022  1:55 PM Floy Drafts Add [R07 9] Cardiac chest pain     6/10/2022  1:55 PM Floy Drafts Modify [J94 8,  Z99 2] ESRD on dialysis ([facility] )     6/10/2022  1:55 PM Floy Drafts Modify [R07 9] Cardiac chest pain       ED Disposition     ED Disposition   Admit    Condition   Stable    Date/Time   Fri Misael 10, 2022 12:51 PM    Comment   Case was discussed with MICKEY and the patient's admission status was agreed to be Admission Status: inpatient status to the service of Dr Daxa Dotson             Follow-up Information    None

## 2022-06-10 NOTE — H&P
[facility]  H&P- Emma Dowd 1960, 58 y o  male MRN: [id number]  Unit/Bed#: ED 29 Encounter: [id number]  Primary Care Provider: Al Champion DO   Date and time admitted to hospital: 6/10/2022 10:03 AM    * Elevated troponin  Assessment & Plan  · In setting of recent cardiac cath  · Currently chest pain free  · Trend troponin to peak  · Cardiology consulted  · Missed session of HD, optimize renal function   · Follow up cardiology recommendations     Uremia  Assessment & Plan  · In setting of missed HD session  · Currently being dialyzed  · Nephrology following     Hypertension  Assessment & Plan  · Could be better controlled  · Exacerbated by fluid overload from missed HD session  · Resume home meds  · Monitor blood pressure     Type 2 diabetes mellitus with chronic kidney disease on chronic dialysis, with long-term current use of insulin [facility]  Assessment & Plan  Lab Results   Component Value Date    HGBA1C 6 0 (H) 04/30/2022       No results for input(s): POCGLU in the last 72 hours  Blood Sugar Average: Last 72 hrs:  · Start diabetic diet, insulin sliding scale  · Adjust insulin as needed      VTE Pharmacologic Prophylaxis: VTE Score: 3 Moderate Risk (Score 3-4) - Pharmacological DVT Prophylaxis Ordered: heparin  Code Status: Level 1 - Full Code   Discussion with family: Patient declined call to   Anticipated Length of Stay: Patient will be admitted on an inpatient basis with an anticipated length of stay of greater than 2 midnights secondary to shortness of breath, missed HD session  Total Time for Visit, including Counseling / Coordination of Care: 30 minutes Greater than 50% of this total time spent on direct patient counseling and coordination of care      Chief Complaint: shortness of breath     History of Present Illness:  Emma Dowd is a 58 y o  male with a PMH of ESRD, CAD, Diabetes who presents with headaches, weakness, shortness of breath, chest pain after missed HD session on 6/9  Had recent cardiac cath and reports compliance with home medication regimen  Came to the ED due to persistence of symptoms, now admitted to [facility] for further management  Review of Systems:  Review of Systems   Constitutional: Negative for chills and fever  HENT: Negative for ear pain and sore throat  Eyes: Negative for pain and visual disturbance  Respiratory: Positive for shortness of breath  Negative for cough  Cardiovascular: Positive for chest pain  Negative for palpitations  Gastrointestinal: Negative for abdominal pain and vomiting  Genitourinary: Negative for dysuria and hematuria  Musculoskeletal: Negative for arthralgias and back pain  Skin: Negative for color change and rash  Neurological: Positive for headaches  Negative for seizures and syncope  All other systems reviewed and are negative  Past Medical and Surgical History:   Past Medical History:   Diagnosis Date    Cerebrovascular accident (CVA) due to thrombosis of [facility] middle [facility] artery [facility] 7/29/2018    Chronic kidney disease     Diabetes mellitus ([facility] )     GERD (gastroesophageal reflux disease)     Hypercholesteremia     Hyperlipidemia     Hypertension     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    Neuropathy     Obesity     Osteomyelitis ([facility] )     last assessed 11/4/16    PVC's (premature ventricular contractions)     sees cardiology Dr Gabby camargo    Stroke [facility])     last weeof July 2018 [address]    TIA (transient ischemic attack) 10/28/2018       Past Surgical History:   Procedure Laterality Date    ABDOMINAL SURGERY      CARDIAC CATHETERIZATION N/A 5/2/2022    Procedure: Cardiac Coronary Angiogram;  Surgeon: Felicia Jones MD;  Location: [facility] CARDIAC CATH LAB; Service: Cardiology    CARDIAC CATHETERIZATION N/A 5/2/2022    Procedure: Cardiac pci;  Surgeon: Felicia Jones MD;  Location: [facility] CARDIAC CATH LAB;   Service: Cardiology    CHOLECYSTECTOMY      Percutaneous    COLONOSCOPY      CYSTOSCOPY      OTHER SURGICAL HISTORY      "stimulator to control bowel movements"    TX ESOPHAGOGASTRODUODENOSCOPY TRANSORAL DIAGNOSTIC N/A 9/27/2016    Procedure: ESOPHAGOGASTRODUODENOSCOPY (EGD); Surgeon: Latanya Mai MD;  Location: [facility] GI LAB; Service: Gastroenterology    TX LAP,CHOLECYSTECTOMY N/A 2/29/2016    Procedure: LAPAROSCOPIC CHOLECYSTECTOMY ;  Surgeon: Aditi Moura DO;  Location: [facility] Main OR;  Service: General    ROTATOR CUFF REPAIR Right     TOE AMPUTATION Right 10/28/2016    Procedure: 3RD TOE AMPUTATION ;  Surgeon: sOwald Smith DPM;  Location: [facility] Main OR;  Service:        Meds/Allergies:  Prior to Admission medications    Medication Sig Start Date End Date Taking? Authorizing Provider   aluminum-magnesium hydroxide-simethicone (MYLANTA) 200-200-20 mg/5 mL suspension Take 30 mL by mouth every 4 (four) hours as needed for indigestion or heartburn 4/21/22   Jayme Valencia MD   aspirin (ECOTRIN LOW STRENGTH) 81 mg EC tablet Take 81 mg by mouth daily Resume on 8/14      Historical Provider, MD   atorvastatin (LIPITOR) 40 mg tablet TAKE 1 TABLET BY MOUTH DAILY WITH DINNER 6/8/22   Raj Shea,    BD Pen Needle Adina U/F 32G X 4 MM MISC USE TO INJECT INSULIN 4 TIMES DAILY 5/19/21   Lissette Brennan PA-C   Blood Glucose Monitoring Suppl (True Metrix Meter) w/Device KIT Use to test blood sugars 3 times daily 3/28/22   Lissette Brennan PA-C   Blood Pressure Monitoring (BLOOD PRESSURE CUFF) MISC Use to check blood pressure before taking blood pressure medication and 1 hour after and follow instructions provided in discharge instructions based on the readings   8/13/18   Jairo Livingston MD   calcium acetate (CALPHRON) 667 mg TAKE 2 TABLETS BY MOUTH THREE TIMES DAILY WITH MEALS 10/17/21   Historical Provider, MD   calcium carbonate (TUMS) 500 mg chewable tablet Chew 1 tablet (500 mg total) daily as needed for indigestion or heartburn 4/21/22 Suni Desai MD   carvedilol (COREG) 12 5 mg tablet TAKE 1 TABLET BY MOUTH TWICE A DAY WITH FOOD 5/23/22   Raj Auguste DO   Cholecalciferol (Vitamin D3) 1 25 MG (81988 UT) CAPS TAKE 1 CAPSULE BY MOUTH ONE TIME PER WEEK 6/3/22   Colletta Bonine, MD   clopidogrel (PLAVIX) 75 mg tablet Take 1 tablet (75 mg total) by mouth in the morning  5/23/22   Cony Beaver MD   clopidogrel (Plavix) 75 mg tablet Take 1 tablet (75 mg total) by mouth in the morning  5/23/22   Cony Beaver MD   Continuous Blood Gluc  ([address]) LANCE Use as directed for continuous glucose monitoring 10/28/19   Lissette Brennan PA-C   Continuous Blood Gluc Sensor (DEXCOM G6 SENSOR) MISC Use as directed for continuous glucose monitoring   Change every 10 days 10/28/19   Lissette Brennan PA-C   Continuous Blood Gluc Transmit (DEXCOM G6 TRANSMITTER) MISC Use as directed for continuous glucose monitoring-Change every 3 months 10/28/19   Lissette Brennan PA-C   doxercalciferol (HECTOROL) 0 5 mcg capsule 4 mcg  Patient not taking: No sig reported 11/10/21 11/9/22  Historical Provider, MD   Incontinence Supplies (MALE URINAL) MISC by Does not apply route daily  Patient not taking: No sig reported 9/24/18   Raj Auguste DO   insulin lispro (HumaLOG KwikPen) 100 units/mL injection pen INJECT 4 UNITS 3 TIMES A DAY WITH MEALS PLUS SCALE (max daily dose 42 units) 4/7/22   Dallas Dempsey MD   Insulin Syringe-Needle U-100 (B-D INS SYRINGE 0 5CC/30GX1/2") 30G X 1/2" 0 5 ML MISC Inject under the skin 4 (four) times a day 6/5/20   Dallas Dempsey MD   Lancets Ultra Thin 30G MISC Use 3 times a day 3/28/22   Lissette Brennan PA-C   Lantus 100 UNIT/ML subcutaneous injection INJECT 14 UNITS UNDER THE SKIN DAILY 4/13/22   Lissette Brennan PA-C   meclizine (ANTIVERT) 25 mg tablet Take 1 tablet (25 mg total) by mouth every 8 (eight) hours as needed for dizziness or nausea 4/7/22   Bridgette Ba MD   Nutritional Supplements (VITAMIN D BOOSTER PO) Take 0 5 mcg by mouth   6/21/21 6/20/22  Historical Provider, MD Saucedaina Bass LANCETS [address] [place] [address] by Does not apply route 3 (three) times a day 11/27/18   Raj Auguste DO   sertraline (Zoloft) 25 mg tablet Take 1/2 tablet every day at bedtime for 4 days, then increase to 1 tablet every day at bedtime 5/17/22   Raj Auguste DO   Sevelamer Carbonate 2 4 g PACK Take 1 packet by mouth Three times a day 5/4/22   Historical Provider, MD   TORSEMIDE PO Take 50 mg by mouth Pt takes 50 mg daily on non- dialysis days: sat, sun, Tues, and thrusday  Pt takes 10 mg of torsemide daily on dialysis days m- w- f   10/25/21   Historical Provider, MD   True Metrix Blood Glucose Test test strip USE 1 EACH 3 (THREE) TIMES A DAY USE AS INSTRUCTED 5/12/22   Mortimer Salk, MD     I have reviewed home medications with patient personally      Allergies: No Known Allergies    Social History:  Marital Status: /Civil Union   Occupation: na  Patient Pre-hospital Living Situation: Home  Patient Pre-hospital Level of Mobility: walks  Patient Pre-hospital Diet Restrictions: renal heart healthy diabetic diet   Substance Use History:   Social History     Substance and Sexual Activity   Alcohol Use Not Currently     Social History     Tobacco Use   Smoking Status Never Smoker   Smokeless Tobacco Never Used     Social History     Substance and Sexual Activity   Drug Use No       Family History:  Family History   Problem Relation Age of Onset    Leukemia Mother     Liver disease Mother     Lung cancer Mother         heavy smoker - 3 ppd    Heart disease Father     Liver disease Father     Multiple myeloma Sister     Breast cancer Sister     Urolithiasis Family     Alcohol abuse Neg Hx     Depression Neg Hx     Drug abuse Neg Hx     Substance Abuse Neg Hx     Mental illness Neg Hx        Physical Exam:     Vitals:   Blood Pressure: 167/73 (06/10/22 1345)  Pulse: 68 (06/10/22 1345)  Temperature: 98 4 °F (36 9 °C) (06/10/22 1315)  Temp Source: Oral (06/10/22 1315)  Respirations: 16 (06/10/22 1345)  Height: 5' 9" (175 3 cm) (06/10/22 1011)  SpO2: 97 % (06/10/22 1011)    Physical Exam  Constitutional:       General: He is not in acute distress  Appearance: Normal appearance  He is not toxic-appearing  Cardiovascular:      Rate and Rhythm: Normal rate and regular rhythm  Heart sounds: Normal heart sounds  No murmur heard  Pulmonary:      Effort: Pulmonary effort is normal  No respiratory distress  Breath sounds: Normal breath sounds  No wheezing  Abdominal:      General: Abdomen is flat  There is no distension  Palpations: Abdomen is soft  Tenderness: There is no abdominal tenderness  Neurological:      General: No focal deficit present  Mental Status: He is alert and oriented to person, place, and time  Mental status is at baseline  Motor: No weakness  Additional Data:     Lab Results:  Results from last 7 days   Lab Units 06/10/22  1103   WBC Thousand/uL 5 85   HEMOGLOBIN g/dL 11 1*   HEMATOCRIT % 35 5*   PLATELETS Thousands/uL 130*   NEUTROS PCT % 62   LYMPHS PCT % 22   MONOS PCT % 12   EOS PCT % 3     Results from last 7 days   Lab Units 06/10/22  1103   SODIUM mmol/L 136   POTASSIUM mmol/L 4 9   CHLORIDE mmol/L 102   CO2 mmol/L 25   BUN mg/dL 47*   CREATININE mg/dL 7 11*   ANION GAP mmol/L 9   CALCIUM mg/dL 9 1   ALBUMIN g/dL 4 0   TOTAL BILIRUBIN mg/dL 0 48   ALK PHOS U/L 85   ALT U/L 13   AST U/L 14   GLUCOSE RANDOM mg/dL 106     Results from last 7 days   Lab Units 06/10/22  1236   INR  1 01                   Imaging: Reviewed radiology reports from this admission including: chest xray  X-ray chest 1 view portable    (Results Pending)       EKG and Other Studies Reviewed on Admission:   · EKG: pending scan into epic chart  ** Please Note: This note has been constructed using a voice recognition system   **

## 2022-06-10 NOTE — ASSESSMENT & PLAN NOTE
· In setting of recent cardiac cath  · Currently chest pain free  · Trend troponin to peak  · Cardiology consulted  · Missed session of HD, optimize renal function   · Follow up cardiology recommendations

## 2022-06-10 NOTE — CONSULTS
Consultation - Cardiology Team One  Kristal Singh 58 y o  male MRN: [id number]  Unit/Bed#: ED 29 Encounter: [id number]    Inpatient consult to Cardiology  Consult performed by: BRITTANY Meraz  Consult ordered by: Charlene Waldrop DO      Physician Requesting Consult: Yann Zhu MD  Reason for Consult / Principal Problem: elevated troponin    Assessment/ Plan    1  Chest pain with elevated troponin  Possibly 2/2 recent PCI of LAD and LCx 6/7/22  Has residual subtotal RPL occlusion that will require PCI in the future- if troponin continues to rise then may need to consider doing this here  Also hypertensive in ED which may be contributing although patient reports controlled BP at home  No evidence of volume overload on exam and telemetry without events  0 hr hs troponin 462  2 hr pending  ECG- NSR with RBBB with T wave abnormality, no significant change from prior  Continue ASA, Plavix, statin, and beta blocker  Add Imdur 30 mg daily, will also need SL NTG PRN at discharge  Monitor BP and on telemetry  Consider checking limited echo    2  MVCAD s/p PCI of OM1 5/2/22, PCI of LAD and LCx 6/7/22  Continue DAPT given recent PCI/ADE  Continue statin and carvedilol  See plan for #1    3  HTN- sub-optimally controlled, 151/94  On carvedilol 12 5 mg BID  Consider increasing this to 25 mg BID  4  Moderate AS- per recent echo    5  HLD- continue statin therapy    6  Type 2 DM- A1c 6 0%, management per primary team    7  Chronic RBBB    8  ESRD on HD- MWF HD via LUE fistula  Nephrology consulted  9  Hx of L MCA CVA- ASA and statin    10  Morbid obesity- BMI 34 85    History of Present Illness   HPI: Kristal Singh is a 58y o  year old male with MVCAD s/p PCI of OM1 05/2/22, PCI of LAD and LCx on 6/7/22 with plans for staged PCI of RCA in 2 weeks time, ESRD on HD, moderate AS, HTN, HLD, type 2 DM, chronic RBBB, morbid obesity, and hx of CVA of left MCA 2018   He follows with cardiologist Dr Graciela Wagner and was last seen in the office on 5/5/22  He had been admitted to [facility] earlier in May with chest pain and elevated troponin in setting of hypertensive urgency  He underwent cardiac cath demonstrating 95% OM1 s/p PCI, 90% RPAV that was medically managed, and non critical disease of mid LAD and mid LCx  He started having recurrent chest pain and presented to the ED on 6/3/22  His troponins were negative and he was discharged home  Apparently his son called [facility] about the patient's recurrent chest pain  The patient tells me he went to the ED there and was admitted for cardiac catheterization but, per chart review, it looks like he was set up for elective cath which he underwent on 6/7/22  This demonstrated progression of his LAD disease to 80% proximally and 70% at mid vessel  Mid Cx was also now 70% stenosed  He underwent lithotripsy and stent placement x 2 to prox/mid LAD and stent placement to circumflex lesion  Plan was made for him to return in two weeks for staged PCI of RPL  He was discharged home after dialysis on 6/8  This morning he developed chest pain at rest that lasted for 30 minutes  He does not have SL nitro at home and thus could not take any  His pain has now resolved and he is undergoing dialysis  His BP is elevated  He is requesting to go home after HD is complete  He has been compliant with ASA and Plavix since his first stent in May  His initial troponin resulted at 462 and cardiology is consulted for further evaluation and management of his chest pain  EKG reviewed personally: 6/10 1106 NSR with RBBB, T wave abnormality     Telemetry reviewed personally: NSR    Review of Systems   Constitutional: Negative for chills, malaise/fatigue and weight gain  Cardiovascular: Positive for chest pain  Negative for dyspnea on exertion, leg swelling, orthopnea, palpitations and syncope     Respiratory: Negative for cough, shortness of breath, sleep disturbances due to breathing and sputum production  Gastrointestinal: Negative for bloating, nausea and vomiting  Neurological: Negative for dizziness, light-headedness and weakness  Psychiatric/Behavioral: Negative for altered mental status  All other systems reviewed and are negative  Historical Information   Past Medical History:   Diagnosis Date    Cerebrovascular accident (CVA) due to thrombosis of left middle cerebral artery ([facility] ) 7/29/2018    Chronic kidney disease     Diabetes mellitus ([facility] )     GERD (gastroesophageal reflux disease)     Hypercholesteremia     Hyperlipidemia     Hypertension     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    Neuropathy     Obesity     Osteomyelitis ([facility] )     last assessed 11/4/16    PVC's (premature ventricular contractions)     sees cardiology Dr Torres camargo    Stroke [facility])     last weeof July 2018 [facility]    TIA (transient ischemic attack) 10/28/2018     Past Surgical History:   Procedure Laterality Date    ABDOMINAL SURGERY      CARDIAC CATHETERIZATION N/A 5/2/2022    Procedure: Cardiac Coronary Angiogram;  Surgeon: Lloyd Chowdary MD;  [facility] C[facility]; Service: Cardiology    CARDIAC CATHETERIZATION N/A 5/2/2022    Procedure: Cardiac pci;  Surgeon: Lloyd Chowdary MD;  [facility]; Service: Cardiology    CHOLECYSTECTOMY      Percutaneous    COLONOSCOPY      CYSTOSCOPY      OTHER SURGICAL HISTORY      "stimulator to control bowel movements"    [place] ESOPHAGOGASTRODUODENOSCOPY TRANSORAL DIAGNOSTIC N/A 9/27/2016    Procedure: ESOPHAGOGASTRODUODENOSCOPY (EGD); Surgeon: Ligia Dupree MD;  Location: [facility] GI LAB;   Service: Gastroenterology    [place] LAP,CHOLECYSTECTOMY N/A 2/29/2016    Procedure: LAPAROSCOPIC CHOLECYSTECTOMY ;  Surgeon: Ashley Vargas DO;  Location: [facility] Main OR;  Service: General    ROTATOR CUFF REPAIR Right     TOE AMPUTATION Right 10/28/2016    Procedure: 3RD TOE AMPUTATION ;  Surgeon: Tonio Yeager DPM;  Location: [facility];  Service:      Social History     Substance and Sexual Activity   Alcohol Use Not Currently     Social History     Substance and Sexual Activity   Drug Use No     Social History     Tobacco Use   Smoking Status Never Smoker   Smokeless Tobacco Never Used     Family History:   Family History   Problem Relation Age of Onset    Leukemia Mother     Liver disease Mother     Lung cancer Mother         heavy smoker - 3 ppd    Heart disease Father     Liver disease Father     Multiple myeloma Sister     Breast cancer Sister     Urolithiasis Family     Alcohol abuse Neg Hx     Depression Neg Hx     Drug abuse Neg Hx     Substance Abuse Neg Hx     Mental illness Neg Hx        Meds/Allergies   all current active meds have been reviewed and current meds:   Current Facility-Administered Medications   Medication Dose Route Frequency    acetaminophen (TYLENOL) tablet 650 mg  650 mg Oral Q6H PRN    atorvastatin (LIPITOR) tablet 40 mg  40 mg Oral Daily With Dinner    calcium carbonate (TUMS) chewable tablet 1,000 mg  1,000 mg Oral Daily PRN    carvedilol (COREG) tablet 12 5 mg  12 5 mg Oral BID With Meals    clopidogrel (PLAVIX) tablet 75 mg  75 mg Oral Daily    docusate sodium (COLACE) capsule 100 mg  100 mg Oral BID    heparin (porcine) subcutaneous injection 5,000 Units  5,000 Units Subcutaneous Q8H [facility]    insulin lispro (HumaLOG) 100 units/mL subcutaneous injection 1-6 Units  1-6 Units Subcutaneous TID AC    insulin lispro (HumaLOG) 100 units/mL subcutaneous injection 1-6 Units  1-6 Units Subcutaneous HS    ondansetron (ZOFRAN) injection 4 mg  4 mg Intravenous Q6H PRN    senna (SENOKOT) tablet 8 6 mg  1 tablet Oral Daily          No Known Allergies    Objective   Vitals: Blood pressure 151/94, pulse 70, temperature 98 7 °F (37 1 °C), resp  rate 16, height 5' 9" (1 753 m), SpO2 97 %  , Body mass index is 34 85 kg/m²  ,     Systolic (61CLJ), JYH:187 , Min:151 , FBR:570     Diastolic (93XMD), Av, Min:94, Max:94      No intake or output data in the 24 hours ending 06/10/22 1321  Wt Readings from Last 3 Encounters:   22 107 kg (236 lb)   22 107 kg (236 lb)   22 107 kg (236 lb)     Invasive Devices  Report    Line  Duration           Hemodialysis AV Fistula Left Upper arm -- days              Physical Exam  Vitals reviewed  Constitutional:       General: He is not in acute distress  Appearance: He is obese  Neck:      Vascular: No hepatojugular reflux or JVD  Cardiovascular:      Rate and Rhythm: Normal rate and regular rhythm  Pulses: Normal pulses  Heart sounds: Murmur heard  Systolic murmur is present  No friction rub  No gallop  Pulmonary:      Effort: Pulmonary effort is normal  No respiratory distress  Breath sounds: No rales  Comments: Decreased at bases but clear, on room air  Abdominal:      General: Bowel sounds are normal  There is no distension  Palpations: Abdomen is soft  Tenderness: There is no abdominal tenderness  Musculoskeletal:         General: No tenderness  Normal range of motion  Cervical back: Neck supple  Right lower leg: No edema  Left lower leg: No edema  Skin:     General: Skin is warm and dry  Findings: No erythema  Neurological:      Mental Status: He is alert and oriented to person, place, and time     Psychiatric:      Comments: tearful         LABORATORY RESULTS:      CBC with diff:   Results from last 7 days   Lab Units 06/10/22  1103 22  1426   WBC Thousand/uL 5 85 5 45   HEMOGLOBIN g/dL 11 1* 10 8*   HEMATOCRIT % 35 5* 33 6*   MCV fL 98 97   PLATELETS Thousands/uL 130* 149   MCH pg 30 6 31 1   MCHC g/dL 31 3* 32 1   RDW % 15 1 15 6*   MPV fL 10 1 10 5   NRBC AUTO /100 WBCs 0 0     CMP:  Results from last 7 days   Lab Units 06/10/22  1103 22  1426   POTASSIUM mmol/L 4 9 3 6   CHLORIDE mmol/L 102 107   CO2 mmol/L 25 27   BUN mg/dL 47* 19   CREATININE mg/dL 7 11* 3 20* CALCIUM mg/dL 9 1 6 7*   AST U/L 14 26   ALT U/L 13 14   ALK PHOS U/L 85 58   EGFR ml/min/1 73sq m 7 19     BMP:  Results from last 7 days   Lab Units 06/10/22  1103 22  1426   POTASSIUM mmol/L 4 9 3 6   CHLORIDE mmol/L 102 107   CO2 mmol/L 25 27   BUN mg/dL 47* 19   CREATININE mg/dL 7 11* 3 20*   CALCIUM mg/dL 9 1 6 7*     Lab Results   Component Value Date    CREATININE 7 11 (H) 06/10/2022    CREATININE 3 20 (H) 2022    CREATININE 5 95 (H) 2022     Lab Results   Component Value Date    NTBNP 1,859 (H) 2022    NTBNP 1,259 (H) 2020    NTBNP 1,579 (H) 2020     Results from last 7 days   Lab Units 06/10/22  1236   INR  1 01     Lipid Profile:   Lab Results   Component Value Date    CHOL 203 (H) 08/10/2016     Lab Results   Component Value Date    HDL 27 (L) 2022    HDL 34 (L) 2022    HDL 28 (L) 2022     Lab Results   Component Value Date    LDLCALC 26 2022    LDLCALC 25 2022    LDLCALC 25 2022     Lab Results   Component Value Date    TRIG 133 2022    TRIG 125 2022    TRIG 116 2022       Cardiac testing:   Results for orders placed during the hospital encounter of 10/05/20    Echo complete with contrast if indicated    Narrative  [place] [address], [address] [facility]  [phone]    Transthoracic Echocardiogram  2D, M-mode, Doppler, and Color Doppler    Study date:  06-Oct-2020    Patient: Rhianna Ferris  MR number: [id number]  Account number: [de-identified]  : 1960  Age: 61 years  Gender: Male  Status: Inpatient  Location: Bedside  Height: 70 in  Weight: 239 6 lb  BP: 165/ 80 mmHg    Indications: Shortness of breath      Diagnoses: R06 02 - Shortness of breath    Sonographer:  Cherelle Martinez RDCS  Primary Physician:  Mario Francisco DO  Referring Physician:  Vik Jiménez MD  Group:  [facility]  Interpreting Physician:  Hans Velázquez MD    SUMMARY    LEFT VENTRICLE:  Systolic function was normal by visual assessment  Ejection fraction was estimated to be 60 %  There were no regional wall motion abnormalities  Wall thickness was moderately increased  Features were consistent with a pseudonormal left ventricular filling pattern, with concomitant abnormal relaxation and increased filling pressure (grade 2 diastolic dysfunction)  LEFT ATRIUM:  The atrium was mildly dilated  RIGHT ATRIUM:  The atrium was mildly dilated  MITRAL VALVE:  There was moderate annular calcification  There was mild regurgitation  AORTIC VALVE:  The valve was trileaflet  Leaflets exhibited normal thickness, moderate calcification, and moderately reduced cuspal separation  Transaortic velocity was increased due to valvular stenosis  There was mild to moderate stenosis  There was mild regurgitation  TRICUSPID VALVE:  There was trace regurgitation  PULMONIC VALVE:  There was mild regurgitation  HISTORY: PRIOR HISTORY: DM2  CKD4  Hypertension  CVA  GERD  Dementia  PROCEDURE: The procedure was performed at the bedside  This was a routine study  The transthoracic approach was used  The study included complete 2D imaging, M-mode, complete spectral Doppler, and color Doppler  The heart rate was 98 bpm,  at the start of the study  Image quality was adequate  LEFT VENTRICLE: Size was normal  Systolic function was normal by visual assessment  Ejection fraction was estimated to be 60 %  There were no regional wall motion abnormalities  Wall thickness was moderately increased  DOPPLER: The ratio  of early ventricular filling to atrial contraction velocities was within the normal range  Features were consistent with a pseudonormal left ventricular filling pattern, with concomitant abnormal relaxation and increased filling pressure  (grade 2 diastolic dysfunction)  RIGHT VENTRICLE: The size was normal  Systolic function was normal  DOPPLER: Systolic pressure was not estimated      LEFT ATRIUM: The atrium was mildly dilated  RIGHT ATRIUM: The atrium was mildly dilated  MITRAL VALVE: There was moderate annular calcification  Valve structure was normal  There was normal leaflet separation  DOPPLER: The transmitral velocity was within the normal range  There was no evidence for stenosis  There was mild  regurgitation  AORTIC VALVE: The valve was trileaflet  Leaflets exhibited normal thickness, moderate calcification, and moderately reduced cuspal separation  DOPPLER: Transaortic velocity was increased due to valvular stenosis  There was mild to moderate  stenosis  There was mild regurgitation  TRICUSPID VALVE: The valve structure was normal  There was normal leaflet separation  DOPPLER: The transtricuspid velocity was within the normal range  There was no evidence for stenosis  There was trace regurgitation  PULMONIC VALVE: Leaflets exhibited normal thickness, no calcification, and normal cuspal separation  DOPPLER: The transpulmonic velocity was within the normal range  There was mild regurgitation  PERICARDIUM: There was no pericardial effusion  AORTA: The root exhibited normal size  SYSTEMIC VEINS: IVC: The inferior vena cava was normal in size and course  Respirophasic changes were normal     SYSTEM MEASUREMENT TABLES    2D  %FS: 36 09 %  Ao Diam: 3 8 cm  EDV(Teich): 182 77 ml  EF(Teich): 64 8 %  ESV(Teich): 64 33 ml  IVSd: 1 57 cm  LA Area: 24 31 cm2  LA Diam: 4 63 cm  LVEDV MOD A4C: 192 34 ml  LVEF MOD A4C: 65 32 %  LVESV MOD A4C: 66 7 ml  LVIDd: 6 04 cm  LVIDs: 3 86 cm  LVLd A4C: 9 02 cm  LVLs A4C: 7 15 cm  LVOT Diam: 2 21 cm  LVPWd: 1 61 cm  RA Area: 17 56 cm2  RVIDd: 4 cm  SV MOD A4C: 125 64 ml  SV(Teich): 118 44 ml    CW  AV Env  Ti: 330 16 ms  AV MaxP 64 mmHg  AV VTI: 57 9 cm  AV Vmax: 2 58 m/s  AV Vmean: 1 76 m/s  AV meanP 38 mmHg  TR MaxP 37 mmHg  TR Vmax: 2 71 m/s    MM  TAPSE: 1 84 cm    PW  FATUMA (VTI): 1 27 cm2  FATUMA Vmax: 1 42 cm2  AVAI (VTI): 0 cm2/m2  AVAI Vmax: 0 cm2/m2  E' Sept: 0 07 m/s  E/E' Sept: 12 69  LVOT Env  Ti: 335 87 ms  LVOT VTI: 19 11 cm  LVOT Vmax: 0 95 m/s  LVOT Vmean: 0 57 m/s  LVOT maxPG: 3 63 mmHg  LVOT meanP 61 mmHg  LVSI Dopp: 32 42 ml/m2  LVSV Dopp: 73 27 ml  MV A [place]: 0 64 m/s  MV Dec [place]: 4 7 m/s2  MV DecT: 189 67 ms  MV E [place]: 0 89 m/s  MV E/A Ratio: 1 38  MV PHT: 55 01 ms  MVA By PHT: 4 cm2    [employer] Accredited Echocardiography Laboratory    Prepared and electronically signed by    Chase Oates MD  Signed 06-Oct-2020 16:31:49    Imaging: I have personally reviewed pertinent reports  and I have personally reviewed pertinent films in PACS  CT abdomen pelvis wo contrast    Result Date: 2022  Narrative: CT ABDOMEN AND PELVIS WITHOUT IV CONTRAST INDICATION:   Abdominal pain, acute, nonlocalized Diffuse abdominal pain, most tender in the right lower quadrant on examination  , chronic distension chronic kidney disease on hemodialysis  COMPARISON:  CT abdomen pelvis 2022 TECHNIQUE:  CT examination of the abdomen and pelvis was performed without intravenous contrast  This examination was performed without intravenous contrast in the context of the critical nationwide Omnipaque shortage  Axial, sagittal, and coronal 2D reformatted images were created from the source data and submitted for interpretation  Radiation dose length product (DLP) for this visit:  1193 72 mGy-cm   This examination, like all CT scans performed in the [facility], was performed utilizing techniques to minimize radiation dose exposure, including the use of iterative reconstruction and automated exposure control  Enteric contrast was not administered  FINDINGS: ABDOMEN Evaluation slightly limited by motion artifact  LOWER CHEST:  Coronary artery and mitral annular calcification  Partially visualized gynecomastia  LIVER/BILIARY TREE:  Stable slightly lobulated contour of the liver  Otherwise unremarkable  No biliary dilation  GALLBLADDER:  Post cholecystectomy  SPLEEN:  Stable splenomegaly  PANCREAS:  Stable fatty infiltration of the pancreas  ADRENAL GLANDS:  Unremarkable  KIDNEYS/URETERS:  Stable bilateral renal atrophy  Small right renal upper pole cyst better visualized on the prior postcontrast study  No hydronephrosis  No perinephric collection  STOMACH AND BOWEL:  Small to moderate size second duodenal segment diverticulum  No diverticulitis  No bowel obstruction  APPENDIX:  A normal appendix was visualized  ABDOMINOPELVIC CAVITY:  No ascites  No pneumoperitoneum  No lymphadenopathy  VESSELS:  Aortoiliac and mesenteric vascular calcification  No aneurysm  Retroaortic left renal vein, normal variant  PELVIS REPRODUCTIVE ORGANS:  Calcification of bilateral vas deferens  Otherwise unremarkable for patient's age  URINARY BLADDER:  Stable minimal trabeculation of the bladder dome  Otherwise unremarkable  ABDOMINAL WALL/INGUINAL REGIONS:  Right superior gluteal subcutaneous stimulator device with transsacral lead  OSSEOUS STRUCTURES:  No acute fracture or osseous destructive lesion identified  Degenerative changes of the spine  Multilevel thoracic spondylosis in a pattern suggestive of diffuse idiopathic skeletal hyperostosis  Impression: No evidence of acute abdominopelvic process  Workstation performed: [id number]     XR chest 2 views    Result Date: 6/3/2022  Narrative: CHEST INDICATION:   cp  COMPARISON:  5/23/2022  EXAM PERFORMED/VIEWS:  XR CHEST PA & LATERAL FINDINGS: Mild cardiomegaly is present  The lungs are clear  No pneumothorax or pleural effusion  Osseous structures appear within normal limits for patient age  Impression: No acute cardiopulmonary disease  Workstation performed: [id number]     XR chest 2 views    Result Date: 5/23/2022  Narrative: CHEST INDICATION:   Cough, shortness of breath   COMPARISON:  May 1, 2022 EXAM PERFORMED/VIEWS:  XR CHEST PA & LATERAL Images: 3 FINDINGS:  Lungs are suboptimally aerated  No lobar consolidation or large effusion  Cardiac size top normal and prominent pulmonary vasculature, probably exaggerated by vascular crowding  No significant peribronchial thickening  No pneumothorax or free air  Osseous structures appear within normal limits for patient age  Impression: No acute cardiopulmonary disease  Workstation performed: [id number]     [address] bedside procedure    Result Date: 5/23/2022  Narrative: 1 2 840 [id number]  2 446 161 [id number] 1    Thank you for allowing us to participate in this patient's care  This pt will follow up with Dr Deneen Fuentes once discharged  Counseling / Coordination of Care  Total floor / unit time spent today 45 minutes  Greater than 50% of total time was spent with the patient and / or family counseling and / or coordination of care  A description of the counseling / coordination of care: Review of history, current assessment, development of a plan  Code Status: Level 1 - Full Code    ** Please Note: Dragon 360 Dictation voice to text software may have been used in the creation of this document   **

## 2022-06-10 NOTE — ASSESSMENT & PLAN NOTE
Lab Results   Component Value Date    HGBA1C 6 0 (H) 04/30/2022       No results for input(s): POCGLU in the last 72 hours      Blood Sugar Average: Last 72 hrs:  · Start diabetic diet, insulin sliding scale  · Adjust insulin as needed

## 2022-06-10 NOTE — PLAN OF CARE
Problem: Potential for Falls  Goal: Patient will remain free of falls  Description: INTERVENTIONS:  - Educate patient/family on patient safety including physical limitations  - Instruct patient to call for assistance with activity   - Consult OT/PT to assist with strengthening/mobility   - Keep Call bell within reach  - Keep bed low and locked with side rails adjusted as appropriate  - Keep care items and personal belongings within reach  - Initiate and maintain comfort rounds  - Make Fall Risk Sign visible to staff  - Offer Toileting every  Hours, in advance of need  - Initiate/Maintain alarm  - Obtain necessary fall risk management equipment  - Apply yellow socks and bracelet for high fall risk patients  - Consider moving patient to room near nurses station  Outcome: Progressing     Problem: METABOLIC, FLUID AND ELECTROLYTES - ADULT  Goal: Electrolytes maintained within normal limits  Description: INTERVENTIONS:  - Monitor labs and assess patient for signs and symptoms of electrolyte imbalances  - Administer electrolyte replacement as ordered  - Monitor response to electrolyte replacements, including repeat lab results as appropriate  - Instruct patient on fluid and nutrition as appropriate  Outcome: Progressing  Goal: Fluid balance maintained  Description: INTERVENTIONS:  - Monitor labs   - Monitor I/O and WT  - Instruct patient on fluid and nutrition as appropriate  - Assess for signs & symptoms of volume excess or deficit  Outcome: Progressing  Goal: Glucose maintained within target range  Description: INTERVENTIONS:  - Monitor Blood Glucose as ordered  - Assess for signs and symptoms of hyperglycemia and hypoglycemia  - Administer ordered medications to maintain glucose within target range  - Assess nutritional intake and initiate nutrition service referral as needed  Outcome: Progressing     Problem: HEMATOLOGIC - ADULT  Goal: Maintains hematologic stability  Description: INTERVENTIONS  - Assess for signs and symptoms of bleeding or hemorrhage  - Monitor labs  - Administer supportive blood products/factors as ordered and appropriate  Outcome: Progressing     Problem: PAIN - ADULT  Goal: Verbalizes/displays adequate comfort level or baseline comfort level  Description: Interventions:  - Encourage patient to monitor pain and request assistance  - Assess pain using appropriate pain scale  - Administer analgesics based on type and severity of pain and evaluate response  - Implement non-pharmacological measures as appropriate and evaluate response  - Consider cultural and social influences on pain and pain management  - Notify physician/advanced practitioner if interventions unsuccessful or patient reports new pain  Outcome: Progressing     Problem: DISCHARGE PLANNING  Goal: Discharge to home or other facility with appropriate resources  Description: INTERVENTIONS:  - Identify barriers to discharge w/patient and caregiver  - Arrange for needed discharge resources and transportation as appropriate  - Identify discharge learning needs (meds, wound care, etc )  - Arrange for interpretive services to assist at discharge as needed  - Refer to Case Management Department for coordinating discharge planning if the patient needs post-hospital services based on physician/advanced practitioner order or complex needs related to functional status, cognitive ability, or social support system  Outcome: Progressing     Problem: Knowledge Deficit  Goal: Patient/family/caregiver demonstrates understanding of disease process, treatment plan, medications, and discharge instructions  Description: Complete learning assessment and assess knowledge base    Interventions:  - Provide teaching at level of understanding  - Provide teaching via preferred learning methods  Outcome: Progressing     Problem: CARDIOVASCULAR - ADULT  Goal: Maintains optimal cardiac output and hemodynamic stability  Description: INTERVENTIONS:  - Monitor I/O, vital signs and rhythm  - Monitor for S/S and trends of decreased cardiac output  - Administer and titrate ordered vasoactive medications to optimize hemodynamic stability  - Assess quality of pulses, skin color and temperature  - Assess for signs of decreased coronary artery perfusion  - Instruct patient to report change in severity of symptoms  Outcome: Progressing  Goal: Absence of cardiac dysrhythmias or at baseline rhythm  Description: INTERVENTIONS:  - Continuous cardiac monitoring, vital signs, obtain 12 lead EKG if ordered  - Administer antiarrhythmic and heart rate control medications as ordered  - Monitor electrolytes and administer replacement therapy as ordered  Outcome: Progressing

## 2022-06-10 NOTE — PLAN OF CARE
Post-Dialysis RN Treatment Note    Blood Pressure:  Pre:184/75 mm/Hg  Post: 128/78 mmHg   EDW: 104 5 kg    Weight:  Unable to weigh patient on stretcher   Mode of weight measurement: N/A   Volume Removed:  ml    Treatment duration: 145 minutes    NS given  No    Treatment shortened? Yes, describe: Patient's request, see below   Medications given during Rx: Hectorol   Estimated Kt/V  Not Applicable   Access type: AV fistula   Access Issues: No    Report called to primary nurse   Yes, Saw    Patient restless throughout tx, needed many reminders to keep arm still, so not to dislodge needles  1 1/2 hours into tx, patient contiguously screaming, " I have pain," patient stated he needed to use restroom  Treatment was paused, daughter and aide assisted patient to restroom, and treatment was restarted without incident  Patient continued to be restless, then sat up on side of stretcher, I reminded him he needed to be mindful of the needles in his arm  He stated, " I have pain, I want to fucking die " I told patient he needed to lie back down, due to machine alarming, and if he chose not to, I was going to discontinue tx  Patient stated, " I don't fucking care what you do, I want to fucking die " Treatment was terminated, Dr Maxine Mcdonald aware and also attempted to speak to patient  Primary nurse made aware      Problem: METABOLIC, FLUID AND ELECTROLYTES - ADULT  Goal: Electrolytes maintained within normal limits  Description: INTERVENTIONS:  - Monitor labs and assess patient for signs and symptoms of electrolyte imbalances  - Administer electrolyte replacement as ordered  - Monitor response to electrolyte replacements, including repeat lab results as appropriate  - Instruct patient on fluid and nutrition as appropriate  Outcome: Progressing  Goal: Fluid balance maintained  Description: INTERVENTIONS:  - Monitor labs   - Monitor I/O and WT  - Instruct patient on fluid and nutrition as appropriate  - Assess for signs & symptoms of volume excess or deficit  Outcome: Progressing     Problem: HEMATOLOGIC - ADULT  Goal: Maintains hematologic stability  Description: INTERVENTIONS  - Assess for signs and symptoms of bleeding or hemorrhage  - Monitor labs  - Administer supportive blood products/factors as ordered and appropriate  Outcome: Progressing

## 2022-06-10 NOTE — CONSULTS
[address] [facility] 58 y o  male MRN: [id number]  Unit/Bed#: ED 29 Encounter: [id number]    ASSESSMENT and PLAN:  1  ESRD on HD:   · Schedule:  Hemodialysis at [facility] every Monday, Wednesday, Friday  · Last hemodialysis treatment Wednesday  Missed treatment this morning due to chest pain, came to the emergency room  · Target weight 104 5 kg  · Will plan on hemodialysis today  2  Access:    · Left arm AV fistula with good bruit and thrill  3  Hypertension:    · Blood pressure slightly elevated  · Continue outpatient medications:  Carvedilol 12 5 mg b i d    · Diuretic:  Torsemide 50 mg on non dialysis days  Torsemide 10 mg on dialysis days  · Volume removal on hemodialysis  4  Anemia:    · Hemoglobin slightly above goal for ESRD, 11 1 (goal 10-11)  · Outpatient medications:  Mircera 30 mcg every 2 weeks, Venofer 100 mg weekly  · Continue Venofer weekly  5  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:   · Renal diet  · Continue PhosLo 667 mg, 2 tablets 3 times daily with meals  · Hectorol 7 mcg 3 times a week on dialysis  · Sensipar 30 mg 3 times a week every Monday, Wednesday, Friday  6  Chest pain/headache/dizziness:    · COVID negative  · Troponin elevated on admission  · Recently started on Zoloft,  took 1st dose yesterday according to his son  7  Hyperlipidemia:  On statin       SUMMARY OF RECOMMENDATIONS:   Hemodialysis today    HISTORY OF PRESENT ILLNESS:  Requesting Physician: Quinten Li DO  Reason for Consult: ESRD on HD    Shanel Mcclain is a 58 y o  male with a history of ESRD on dialysis, diabetes mellitus type 2, hypertension, CVA who was admitted to [facility] after presenting with headache, chest pain, dizziness  Patient seen in the emergency room with the son in attendance  He recently had angioplasty procedure  He has been going for hemodialysis every Monday, Wednesday, Friday    He missed hemodialysis treatment today since he came to the emergency room  He denies unusual shortness of breath at this time  No significant swelling  Started on Zoloft yesterday, anxious  A renal consultation is requested today for assistance in the management of ESRD  Kellen Garcia is a known ESRD patient who undergoes maintenance hemodialysis at [facility] on Monday, Wednesday, Friday  PAST MEDICAL HISTORY:  Past Medical History:   Diagnosis Date    Cerebrovascular accident (CVA) due to thrombosis of left middle cerebral artery ([facility] [address] ) 7/29/2018    Chronic kidney disease     Diabetes mellitus ([facility] [facility] )     GERD (gastroesophageal reflux disease)     Hypercholesteremia     Hyperlipidemia     Hypertension     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    Neuropathy     Obesity     Osteomyelitis ([facility] [address] )     last assessed 11/4/16    PVC's (premature ventricular contractions)     sees cardiology Dr Alma camargo    Stroke [facility])     last weeof July 2018 [address] [facility]    TIA (transient ischemic attack) 10/28/2018       PAST SURGICAL HISTORY:  Past Surgical History:   Procedure Laterality Date    ABDOMINAL SURGERY      CARDIAC CATHETERIZATION N/A 5/2/2022    Procedure: Cardiac Coronary Angiogram;  Surgeon: Jenny Robles MD;  [facility] CARD[facility]; Service: Cardiology    CARDIAC CATHETERIZATION N/A 5/2/2022    Procedure: Cardiac pci;  Surgeon: Jenny Robles MD;  [facility] CARD[facility]; Service: Cardiology    CHOLECYSTECTOMY      Percutaneous    COLONOSCOPY      CYSTOSCOPY      OTHER SURGICAL HISTORY      "stimulator to control bowel movements"    NC ESOPHAGOGASTRODUODENOSCOPY TRANSORAL DIAGNOSTIC N/A 9/27/2016    Procedure: ESOPHAGOGASTRODUODENOSCOPY (EGD); Surgeon: Tyler Baltazar MD;  Location: [facility] GI LAB;   Service: Gastroenterology    NC LAP,CHOLECYSTECTOMY N/A 2/29/2016    Procedure: LAPAROSCOPIC CHOLECYSTECTOMY ;  Surgeon: Delma Alvarado DO;  Location: [facility] Main OR;  Service: General    ROTATOR CUFF REPAIR Right  TOE AMPUTATION Right 10/28/2016    Procedure: 3RD TOE AMPUTATION ;  Surgeon: Fernando Sy DPM;  Location: [facility] Main [facility];  Service:        ALLERGIES:  No Known Allergies    SOCIAL HISTORY:  Social History     Substance and Sexual Activity   Alcohol Use Not Currently     Social History     Substance and Sexual Activity   Drug Use No     Social History     Tobacco Use   Smoking Status Never Smoker   Smokeless Tobacco Never Used       FAMILY HISTORY:  Family History   Problem Relation Age of Onset    Leukemia Mother     Liver disease Mother     Lung cancer Mother         heavy smoker - 3 ppd    Heart disease Father     Liver disease Father     Multiple myeloma Sister     Breast cancer Sister     Urolithiasis Family     Alcohol abuse Neg Hx     Depression Neg Hx     Drug abuse Neg Hx     Substance Abuse Neg Hx     Mental illness Neg Hx        MEDICATIONS:  No current facility-administered medications for this encounter  Current Outpatient Medications:     aluminum-magnesium hydroxide-simethicone (MYLANTA) 200-200-20 mg/5 mL suspension, Take 30 mL by mouth every 4 (four) hours as needed for indigestion or heartburn, Disp: 355 mL, Rfl: 0    aspirin (ECOTRIN LOW STRENGTH) 81 mg EC tablet, Take 81 mg by mouth daily Resume on 8/14  , Disp: , Rfl:     atorvastatin (LIPITOR) 40 mg tablet, TAKE 1 TABLET BY MOUTH DAILY WITH DINNER, Disp: 90 tablet, Rfl: 1    BD Pen Needle Adina U/F 32G X 4 MM MISC, USE TO INJECT INSULIN 4 TIMES DAILY, Disp: 400 each, Rfl: 1    Blood Glucose Monitoring Suppl (True Metrix Meter) w/Device KIT, Use to test blood sugars 3 times daily, Disp: 1 kit, Rfl: 0    Blood Pressure Monitoring (BLOOD PRESSURE CUFF) MISC, Use to check blood pressure before taking blood pressure medication and 1 hour after and follow instructions provided in discharge instructions based on the readings  , Disp: 1 each, Rfl: 0    calcium acetate (CALPHRON) 667 mg, TAKE 2 TABLETS BY MOUTH THREE TIMES DAILY WITH MEALS, Disp: , Rfl:     calcium carbonate (TUMS) 500 mg chewable tablet, Chew 1 tablet (500 mg total) daily as needed for indigestion or heartburn, Disp: , Rfl: 0    carvedilol (COREG) 12 5 mg tablet, TAKE 1 TABLET BY MOUTH TWICE A DAY WITH FOOD, Disp: 180 tablet, Rfl: 1    Cholecalciferol (Vitamin D3) 1 25 MG (39880 UT) CAPS, TAKE 1 CAPSULE BY MOUTH ONE TIME PER WEEK, Disp: 12 capsule, Rfl: 2    clopidogrel (PLAVIX) 75 mg tablet, Take 1 tablet (75 mg total) by mouth in the morning , Disp: 90 tablet, Rfl: 2    clopidogrel (Plavix) 75 mg tablet, Take 1 tablet (75 mg total) by mouth in the morning , Disp: 90 tablet, Rfl: 3    Continuous Blood Gluc  (DEXCOM G6 ) LANCE, Use as directed for continuous glucose monitoring, Disp: 1 Device, Rfl: 0    Continuous Blood Gluc Sensor (DEXCOM G6 SENSOR) MISC, Use as directed for continuous glucose monitoring   Change every 10 days, Disp: 1 each, Rfl: 11    Continuous Blood Gluc Transmit (DEXCOM G6 TRANSMITTER) MISC, Use as directed for continuous glucose monitoring-Change every 3 months, Disp: 1 each, Rfl: 3    doxercalciferol (HECTOROL) 0 5 mcg capsule, 4 mcg (Patient not taking: No sig reported), Disp: , Rfl:     Incontinence Supplies (MALE URINAL) MISC, by Does not apply route daily (Patient not taking: No sig reported), Disp: 6 each, Rfl: 3    insulin lispro (HumaLOG KwikPen) 100 units/mL injection pen, INJECT 4 UNITS 3 TIMES A DAY WITH MEALS PLUS SCALE (max daily dose 42 units), Disp: 45 mL, Rfl: 1    Insulin Syringe-Needle U-100 (B-D INS SYRINGE 0 5CC/30GX1/2") 30G X 1/2" 0 5 ML MISC, Inject under the skin 4 (four) times a day, Disp: 360 each, Rfl: 1    Lancets Ultra Thin 30G MISC, Use 3 times a day, Disp: 300 each, Rfl: 0    Lantus 100 UNIT/ML subcutaneous injection, INJECT 14 UNITS UNDER THE SKIN DAILY, Disp: 10 mL, Rfl: 0    meclizine (ANTIVERT) 25 mg tablet, Take 1 tablet (25 mg total) by mouth every 8 (eight) hours as needed for dizziness or nausea, Disp: 30 tablet, Rfl: 0    Nutritional Supplements (VITAMIN D BOOSTER PO), Take 0 5 mcg by mouth  , Disp: , Rfl:     ONETOUCH DELICA LANCETS 39K MISC, by Does not apply route 3 (three) times a day, Disp: 270 each, Rfl: 1    sertraline (Zoloft) 25 mg tablet, Take 1/2 tablet every day at bedtime for 4 days, then increase to 1 tablet every day at bedtime, Disp: 30 tablet, Rfl: 1    Sevelamer Carbonate 2 4 g PACK, Take 1 packet by mouth Three times a day, Disp: , Rfl:     TORSEMIDE PO, Take 50 mg by mouth Pt takes 50 mg daily on non- dialysis days: sat, sun, Tues, and thrusday  Pt takes 10 mg of torsemide daily on dialysis days m- w- f  , Disp: , Rfl:     True Metrix Blood Glucose Test test strip, USE 1 EACH 3 (THREE) TIMES A DAY USE AS INSTRUCTED, Disp: 100 strip, Rfl: 5    REVIEW OF SYSTEMS:  Constitutional:  Positive for fatigue  No fevers or chills reported  HENT: Negative for postnasal drip  Eyes: Negative for visual disturbance  Respiratory:  Negative for shortness of breath and wheezing  Cardiovascular:  Positive for chest pain  No leg edema  Gastrointestinal: Negative for abdominal pain, constipation, diarrhea, nausea and vomiting  Genitourinary: No dysuria, hematuria  Endocrine: Negative for polyuria  Musculoskeletal:  Positive for arthralgias  Skin: Negative for rash  Neurological:  No focal weakness  Positive for headache and dizziness  Hematological: Negative for easy bruising or bleeding  Psychiatric/Behavioral: Negative for confusion   All the systems were reviewed and were negative except as documented on the HPI  PHYSICAL EXAM:  Current Weight:    First Weight:    Vitals:    06/10/22 1011   BP: 151/94   BP Location: Right arm   Pulse: 70   Resp: 16   Temp: 98 7 °F (37 1 °C)   SpO2: 97%   Height: 5' 9" (1 753 m)     No intake or output data in the 24 hours ending 06/10/22 1236  Physical Exam  Vitals reviewed     Constitutional:       General: He is not in acute distress  Appearance: He is well-developed  He is ill-appearing  He is not toxic-appearing or diaphoretic  HENT:      Head: Normocephalic and atraumatic  Nose: Nose normal  No congestion  Mouth/Throat:      Mouth: Mucous membranes are moist       Pharynx: No oropharyngeal exudate  Eyes:      General: No scleral icterus  Right eye: No discharge  Left eye: No discharge  Extraocular Movements: Extraocular movements intact  Conjunctiva/sclera: Conjunctivae normal    Neck:      Thyroid: No thyromegaly  Vascular: No carotid bruit or JVD  Trachea: No tracheal deviation  Cardiovascular:      Rate and Rhythm: Normal rate and regular rhythm  Heart sounds: No murmur heard  No friction rub  No gallop  Arteriovenous access: left arteriovenous access is present  Comments: Left AV fistula with good bruit and thrill  Aneurysmal in appearance  Pulmonary:      Effort: Pulmonary effort is normal  No respiratory distress  Breath sounds: Normal breath sounds  No stridor  No wheezing, rhonchi or rales  Abdominal:      General: Bowel sounds are normal  There is no distension  Palpations: Abdomen is soft  There is no mass  Tenderness: There is no abdominal tenderness  There is no guarding or rebound  Musculoskeletal:         General: No swelling, tenderness or signs of injury  Normal range of motion  Cervical back: Normal range of motion and neck supple  No rigidity or tenderness  Right lower leg: No edema  Left lower leg: No edema  Skin:     General: Skin is warm and dry  Capillary Refill: Capillary refill takes less than 2 seconds  Coloration: Skin is not jaundiced or pale  Findings: No erythema or rash  Neurological:      Mental Status: He is alert and oriented to person, place, and time  Psychiatric:         Mood and Affect: Mood normal          Behavior: Behavior normal          Thought Content:  Thought content normal          Judgment: Judgment normal            Invasive Devices:      Lab Results:   Results from last 7 days   Lab Units 06/10/22  1103 06/03/22  1426   WBC Thousand/uL 5 85 5 45   HEMOGLOBIN g/dL 11 1* 10 8*   HEMATOCRIT % 35 5* 33 6*   PLATELETS Thousands/uL 130* 149   POTASSIUM mmol/L 4 9 3 6   CHLORIDE mmol/L 102 107   CO2 mmol/L 25 27   BUN mg/dL 47* 19   CREATININE mg/dL 7 11* 3 20*   CALCIUM mg/dL 9 1 6 7*   ALK PHOS U/L 85 58   ALT U/L 13 14   AST U/L 14 26     Lab Results   Component Value Date    CALCIUM 9 1 06/10/2022    PHOS 6 4 (H) 05/01/2022     Other Studies:

## 2022-06-11 VITALS
DIASTOLIC BLOOD PRESSURE: 96 MMHG | BODY MASS INDEX: 33.18 KG/M2 | TEMPERATURE: 98.1 F | OXYGEN SATURATION: 98 % | HEIGHT: 69 IN | RESPIRATION RATE: 20 BRPM | HEART RATE: 68 BPM | WEIGHT: 224 LBS | SYSTOLIC BLOOD PRESSURE: 141 MMHG

## 2022-06-11 LAB
ANION GAP SERPL CALCULATED.3IONS-SCNC: 13 MMOL/L (ref 4–13)
BUN SERPL-MCNC: 40 MG/DL (ref 5–25)
CALCIUM SERPL-MCNC: 8.6 MG/DL (ref 8.4–10.2)
CHLORIDE SERPL-SCNC: 97 MMOL/L (ref 96–108)
CO2 SERPL-SCNC: 25 MMOL/L (ref 21–32)
CREAT SERPL-MCNC: 6.52 MG/DL (ref 0.6–1.3)
ERYTHROCYTE [DISTWIDTH] IN BLOOD BY AUTOMATED COUNT: 15 % (ref 11.6–15.1)
GFR SERPL CREATININE-BSD FRML MDRD: 8 ML/MIN/1.73SQ M
GLUCOSE SERPL-MCNC: 103 MG/DL (ref 65–140)
GLUCOSE SERPL-MCNC: 191 MG/DL (ref 65–140)
GLUCOSE SERPL-MCNC: 208 MG/DL (ref 65–140)
HCT VFR BLD AUTO: 37.4 % (ref 36.5–49.3)
HGB BLD-MCNC: 11.3 G/DL (ref 12–17)
MAGNESIUM SERPL-MCNC: 2.1 MG/DL (ref 1.9–2.7)
MCH RBC QN AUTO: 30.8 PG (ref 26.8–34.3)
MCHC RBC AUTO-ENTMCNC: 30.2 G/DL (ref 31.4–37.4)
MCV RBC AUTO: 102 FL (ref 82–98)
PLATELET # BLD AUTO: 140 THOUSANDS/UL (ref 149–390)
PMV BLD AUTO: 10.4 FL (ref 8.9–12.7)
POTASSIUM SERPL-SCNC: 4.2 MMOL/L (ref 3.5–5.3)
RBC # BLD AUTO: 3.67 MILLION/UL (ref 3.88–5.62)
SODIUM SERPL-SCNC: 135 MMOL/L (ref 135–147)
WBC # BLD AUTO: 6.26 THOUSAND/UL (ref 4.31–10.16)

## 2022-06-11 PROCEDURE — 82948 REAGENT STRIP/BLOOD GLUCOSE: CPT

## 2022-06-11 PROCEDURE — 99238 HOSP IP/OBS DSCHRG MGMT 30/<: CPT | Performed by: INTERNAL MEDICINE

## 2022-06-11 PROCEDURE — 85027 COMPLETE CBC AUTOMATED: CPT | Performed by: STUDENT IN AN ORGANIZED HEALTH CARE EDUCATION/TRAINING PROGRAM

## 2022-06-11 PROCEDURE — 99232 SBSQ HOSP IP/OBS MODERATE 35: CPT | Performed by: INTERNAL MEDICINE

## 2022-06-11 PROCEDURE — 97163 PT EVAL HIGH COMPLEX 45 MIN: CPT

## 2022-06-11 PROCEDURE — 80048 BASIC METABOLIC PNL TOTAL CA: CPT | Performed by: STUDENT IN AN ORGANIZED HEALTH CARE EDUCATION/TRAINING PROGRAM

## 2022-06-11 PROCEDURE — 83735 ASSAY OF MAGNESIUM: CPT | Performed by: STUDENT IN AN ORGANIZED HEALTH CARE EDUCATION/TRAINING PROGRAM

## 2022-06-11 RX ORDER — ISOSORBIDE MONONITRATE 30 MG/1
30 TABLET, EXTENDED RELEASE ORAL DAILY
Qty: 30 TABLET | Refills: 0 | Status: SHIPPED | OUTPATIENT
Start: 2022-06-12 | End: 2022-07-14

## 2022-06-11 RX ORDER — NITROGLYCERIN 0.4 MG/1
0.4 TABLET SUBLINGUAL
Qty: 30 TABLET | Refills: 0 | Status: SHIPPED | OUTPATIENT
Start: 2022-06-11 | End: 2022-06-15

## 2022-06-11 RX ORDER — CARVEDILOL 25 MG/1
25 TABLET ORAL 2 TIMES DAILY WITH MEALS
Qty: 60 TABLET | Refills: 0 | Status: SHIPPED | OUTPATIENT
Start: 2022-06-11 | End: 2022-06-15 | Stop reason: SDUPTHER

## 2022-06-11 RX ADMIN — HEPARIN SODIUM 5000 UNITS: 5000 INJECTION INTRAVENOUS; SUBCUTANEOUS at 05:25

## 2022-06-11 RX ADMIN — CLOPIDOGREL BISULFATE 75 MG: 75 TABLET ORAL at 08:15

## 2022-06-11 RX ADMIN — CARVEDILOL 25 MG: 12.5 TABLET, FILM COATED ORAL at 08:15

## 2022-06-11 RX ADMIN — SENNOSIDES 8.6 MG: 8.6 TABLET, FILM COATED ORAL at 08:15

## 2022-06-11 RX ADMIN — ISOSORBIDE MONONITRATE 30 MG: 30 TABLET, EXTENDED RELEASE ORAL at 08:15

## 2022-06-11 RX ADMIN — DOCUSATE SODIUM 100 MG: 100 CAPSULE, LIQUID FILLED ORAL at 08:16

## 2022-06-11 RX ADMIN — ASPIRIN 81 MG CHEWABLE TABLET 81 MG: 81 TABLET CHEWABLE at 08:15

## 2022-06-11 NOTE — PHYSICAL THERAPY NOTE
Physical Therapy Evaluation    Patient's Name: [facility]    Admitting Diagnosis  Dizziness [R42]  Cardiac chest pain [R07 9]  Elevated troponin [R77 8]  ESRD on dialysis ([facility] ) [N18 6, Z99 2]  Headache [R51 9]    Problem List  Patient Active Problem List   Diagnosis    Type 2 diabetes mellitus with chronic kidney disease on chronic dialysis, with long-term current use of insulin (HCC)    Dizziness    Mixed hyperlipidemia    Nephrotic range proteinuria    Cognitive impairment    Elevated alkaline phosphatase level    Diabetic macular edema (HCC)    GERD (gastroesophageal reflux disease)    Diabetic polyneuropathy associated with type 2 diabetes mellitus (HCC)    Other constipation    Abnormal EEG    History of stroke    symptomatic hypoglycemia    Anxiety associated with depression    Urge incontinence    Overactive bladder    S/P arteriovenous (AV) fistula creation    Pre-kidney transplant, listed    Acute kidney injury superimposed on chronic kidney disease ([facility] )    Anemia    History of 2019 novel coronavirus disease (COVID-19)    ESRD on dialysis ([facility] )    Pancytopenia ([facility] )    Enteritis    Leukocytosis    Penetrating foot wound, left, initial encounter    Emotional lability    Elevated troponin    Hypertension    Right sided pain and paresthesias    Chest pain    Electrolyte abnormality    Uremia       Past Medical History  Past Medical History:   Diagnosis Date    Cerebrovascular accident (CVA) due to thrombosis of left middle cerebral artery ([facility] ) 7/29/2018    Chronic kidney disease     Diabetes mellitus ([facility] )     GERD (gastroesophageal reflux disease)     Hypercholesteremia     Hyperlipidemia     Hypertension     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    Neuropathy     Obesity     Osteomyelitis ([facility] )     last assessed 11/4/16    PVC's (premature ventricular contractions)     sees cardiology Dr Ponce camargo    Stroke [facility])     last weeof July 2018 [address] [facility]    TIA (transient ischemic attack) 10/28/2018       Past Surgical History  Past Surgical History:   Procedure Laterality Date    ABDOMINAL SURGERY      CARDIAC CATHETERIZATION N/A 5/2/2022    Procedure: Cardiac Coronary Angiogram;  Surgeon: Alejandra Emanuel MD;  [facility] CARD[facility]; Service: Cardiology    CARDIAC CATHETERIZATION N/A 5/2/2022    Procedure: Cardiac pci;  Surgeon: Alejandra Emanuel MD;  [facility]: [facility] CARD[facility]; Service: Cardiology    CHOLECYSTECTOMY      Percutaneous    COLONOSCOPY      CYSTOSCOPY      OTHER SURGICAL HISTORY      "stimulator to control bowel movements"    [place] ESOPHAGOGASTRODUODENOSCOPY TRANSORAL DIAGNOSTIC N/A 9/27/2016    Procedure: ESOPHAGOGASTRODUODENOSCOPY (EGD); Surgeon: Thalia Dunham MD;  Location: [facility] GI LAB; Service: Gastroenterology    [place] LAP,CHOLECYSTECTOMY N/A 2/29/2016    Procedure: LAPAROSCOPIC CHOLECYSTECTOMY ;  Surgeon: Chevy Bright DO;  Location: [facility] Main OR;  Service: General    ROTATOR CUFF REPAIR Right     TOE AMPUTATION Right 10/28/2016    Procedure: 3RD TOE AMPUTATION ;  Surgeon: Jaden Gamino DPM;  Location: [facility] Main [facility];  Service:         06/11/22 1152   PT Last Visit   PT Visit Date 06/11/22   Note Type   Note type Evaluation and One Time Visit   Pain Assessment   Pain Assessment Tool 0-10   Pain Score No Pain   Restrictions/Precautions   Weight Bearing Precautions Per Order No   Other Precautions Fall Risk;Telemetry; Visual impairment;Hard of hearing   Home Living   Type of Home House   Bathroom Shower/Tub Tub/shower unit   [employer] Standard   Prior Function   Level of [place] Independent with ADLs and functional mobility   Lives With Spouse   Receives Help From [facility] in the last 6 months 0   Vocational   (former )   General   Family/Caregiver Present No   Cognition   Arousal/Participation Cooperative   Orientation Level Oriented X4   Subjective   Subjective "im ready to go"   RLE Assessment   RLE Assessment WFL  (grossly 4-/5) LLE Assessment   LLE Assessment WFL  (grossly 4-/5)   Light Touch   RLE Light Touch Impaired   RLE Light Touch Comments L5-S1   LLE Light Touch Impaired   LLE Light Touch Comments L5-S1   Five Times Sit To Stand   Time (Seconds) 10 Seconds   Bed Mobility   Additional Comments pt OOB in recliner at start and end of session   Transfers   Sit to Stand 7  Independent   Additional items Increased time required   Stand to Sit 7  Independent   Additional items Increased time required   Ambulation/Elevation   Gait pattern Wide JOSE DAVID   Gait Assistance 5  Supervision   Distance 50 ft x 2   Stair Management Assistance 5  Supervision   Additional items Increased time required   Stair Management Technique One rail R;One rail L;Reciprocal   Number of Stairs 5   Balance   Static Standing Fair   Ambulatory Fair   Higher level balance   (EO: 30 sec min sway, EC: 10 sec LOB, tandem: unable)   Endurance Deficit   Endurance Deficit Yes   Endurance Deficit Description pt SOB after ambulation   Activity Tolerance   Activity Tolerance Patient limited by fatigue   Nurse Made Aware cleared by THIERNO robertson   Assessment   Prognosis Fair   Problem List Decreased strength;Decreased endurance; Impaired balance; Impaired judgement;Decreased safety awareness; Impaired vision; Impaired hearing; Impaired sensation;Obesity   Assessment Pt is a 58 y o  male seen for PT evaluation s/p admit to [address] [place] [address] on 6/10/2022  Pt was admitted with a primary dx of: dizziness, cardiac chest pain, elevated troponin, ESRD on dialysis, headache     PT now consulted for assessment of mobility and d/c needs  Pt with Activity as tolerated orders  Pts current comorbidities effecting treatment include: BMI, DM II, HLD, GERD HTN, history of stroke   Pts current clinical presentation is Unstable/ Unpredictable (high complexity) due to Ongoing medical management for primary dx, Decreased activity tolerance compared to baseline, Increased assistance needed from caregiver at current time, Ongoing telemetry monitoring, Cog status, Trending lab values  Prior to admission, pt was independent at home  Upon evaluation, pt currently is supervision to independent with transfers and ambulation  Rl Miles Pt presents at PT eval functioning below baseline and currently w/ overall mobility deficits 2* to: BLE weakness, decreased ROM, impaired balance, decreased endurance, gait deviations, decreased activity tolerance compared to baseline, decreased functional mobility tolerance compared to baseline, altered sensation, decreased safety awareness, impaired judgement, decreased skin integrity, decreased cognition  Pt currently at a fall risk 2* to impairments listed above  Pt does not currently have acute PT needs  At conclusion of PT session pt returned back in chair, all needs in reach and RN notified of session findings/recommendations with phone and call bell within reach  Pt denies any further questions at this time  Recommend home with outpatient PT upon hospital D/C  Goals   Patient Goals to go home   PT Treatment Day 0   Recommendation   PT Discharge Recommendation Home with outpatient rehabilitation   AM-PAC Basic Mobility Inpatient   Turning in Bed Without Bedrails 3   Lying on Back to Sitting on Edge of Flat Bed 3   Moving Bed to Chair 4   Standing Up From Chair 4   Walk in Room 4   Climb 3-5 Stairs 3   Basic Mobility Inpatient Raw Score 21   Basic Mobility Standardized Score 45 55   Highest Level Of Mobility   JH-HLM Goal 6: Walk 10 steps or more   JH-HLM Achieved 7: Walk 25 feet or more   Barthel Index   Feeding 10   Bathing 5   Grooming Score 5   Dressing Score 10   Bladder Score 10   Bowels Score 10   Toilet Use Score 10   Transfers (Bed/Chair) Score 15   Mobility (Level Surface) Score 10   Stairs Score 10   Barthel Index Score 95   End of Consult   Patient Position at End of Consult Bedside chair; All needs within reach   The patient's AM-PAC Basic Mobility Inpatient Short Form Raw Score is 21  A Raw score of greater than 16 suggests the patient may benefit from discharge to home  Please also refer to the recommendation of the Physical Therapist for safe discharge planning            Sanchez Ordaz, PT, DPT

## 2022-06-11 NOTE — PROGRESS NOTES
[facility] PROGRESS NOTE   Emma Dowd 58 y o  male MRN: [id number]  Unit/Bed#: S -01 Encounter: [id number]  Reason for Consult: ESRD on HD    ASSESSMENT and PLAN:  1  ESRD on HD ([facility])   · Had HD yesterday but only for 2 hours  · No acute indication for HD today  · Next HD can be done on Monday, June 13        2  Access: L arm AVF         3  Hypertension:   · BP is acceptable   5 kg     · Home meds: Carvedilol 12 5 mg BID, Torsemide 50 mg on non HD days  · Carvedilol increased to 25 mg BID by cardiology  · Imdur 30 mg daily added by cardiology        4  Anemia:   · Hgb above goal     · On Mircera and Venofer as an outpatient  5  Mineral and bone disease:   · Continue Phoslo 2 tabs TID for hyperphos  · Continue Hectorol 7 mcg with HD and Sensipar 30 mg 3/week for 2HPT         6  Multivessel CAD   · S/p recent cath for staged PCI  · Cardiology consulted and meds adjusted  · Patient will need to follow up in [place] for staged PCI  DISPOSITION:  · No acute indication for dialysis today  · Next HD can be done on Monday, June 13  · Stable for discharge from renal standpoint  SUBJECTIVE / 24H INTERVAL HISTORY:  No headache  No CP or SOB  Tolerated only 2 hours of dialysis yesterday due to restlessness       OBJECTIVE:  Current Weight: Weight - Scale: 102 kg (224 lb)  Vitals:    06/10/22 2202 06/11/22 0524 06/11/22 0600 06/11/22 0752   BP: 143/65   141/96   BP Location: Right arm   Right arm   Pulse: 68   68   Resp: 16   20   Temp: 98 2 °F (36 8 °C)   98 1 °F (36 7 °C)   TempSrc: Oral   Oral   SpO2: 94%   98%   Weight:  102 kg (224 lb) 102 kg (224 lb)    Height:           Intake/Output Summary (Last 24 hours) at 6/11/2022 1051  Last data filed at 6/10/2022 1807  Gross per 24 hour   Intake 1240 ml   Output 2300 ml   Net -1060 ml     General: conscious, cooperative, no distress  Skin: dry  Eyes: pink conjunctivae  ENT: moist mucous membranes  Respiratory: equal chest expansion, clear breath sounds  Cardiovascular: distinct heart sounds, normal rate, regular rhythm, no rub  Abdomen: soft, non tender, non distended, normal bowel sounds  Extremities: no edema  Genitourinary: no chávez catheter  Neuro: awake, alert     Psych: anxious affect    Medications:    Current Facility-Administered Medications:     acetaminophen (TYLENOL) tablet 650 mg, 650 mg, Oral, Q6H PRN, Sanjeev De La Cruz MD    aspirin chewable tablet 81 mg, 81 mg, Oral, Daily, BRITTANY Dooley, 81 mg at 06/11/22 0815    atorvastatin (LIPITOR) tablet 40 mg, 40 mg, Oral, Daily With Dianna Rust MD, 40 mg at 06/10/22 1703    calcium carbonate (TUMS) chewable tablet 1,000 mg, 1,000 mg, Oral, Daily PRN, Sanjeev De La Cruz MD    carvedilol (COREG) tablet 25 mg, 25 mg, Oral, BID With Meals, BRITTANY Mccloud, 25 mg at 06/11/22 0815    cinacalcet (SENSIPAR) tablet 30 mg, 30 mg, Oral, Once per day on Mon Wed Fri, BRITTANY Wong    clopidogrel (PLAVIX) tablet 75 mg, 75 mg, Oral, Daily, Sanjeev De La Cruz MD, 75 mg at 06/11/22 0815    docusate sodium (COLACE) capsule 100 mg, 100 mg, Oral, BID, Sanjeev De La Cruz MD, 100 mg at 06/11/22 0816    doxercalciferol (HECTOROL) injection 7 mcg, 7 mcg, Intravenous, Once per day on Mon Wed Fri, BRITTANY Wong, 7 mcg at 06/10/22 1448    heparin (porcine) subcutaneous injection 5,000 Units, 5,000 Units, Subcutaneous, Q8H [address], Sanjeev De La Cruz MD, 5,000 Units at 06/11/22 0525    insulin lispro (HumaLOG) 100 units/mL subcutaneous injection 1-6 Units, 1-6 Units, Subcutaneous, TID AC **AND** Fingerstick Glucose (POCT), , , TID AC, Sanjeev De La Cruz MD    insulin lispro (HumaLOG) 100 units/mL subcutaneous injection 1-6 Units, 1-6 Units, Subcutaneous, HS, Sanjeev De La Cruz MD, 2 Units at 06/10/22 2205    [START ON 6/15/2022] iron sucrose (VENOFER) 100 mg in sodium chloride 0 9 % 100 mL IVPB, 100 mg, Intravenous, Weekly, Bekah Leong BRITTANY Martinez    isosorbide mononitrate (IMDUR) 24 hr tablet 30 mg, 30 mg, Oral, Daily, BRITTANY Dooley, 30 mg at 06/11/22 0815    nitroglycerin (NITROSTAT) SL tablet 0 4 mg, 0 4 mg, Sublingual, Q5 Min PRN, BRITTANY Guzmán    ondansetron [facility]) injection 4 mg, 4 mg, Intravenous, Q6H PRN, Trevor Loyola MD    [facility]) tablet 8 6 mg, 1 tablet, Oral, Daily, Trevor Loyola MD, 8 6 mg at 06/11/22 0815    Laboratory Results:  Results from last 7 days   Lab Units 06/11/22  0523 06/10/22  1103   WBC Thousand/uL 6 26 5 85   HEMOGLOBIN g/dL 11 3* 11 1*   HEMATOCRIT % 37 4 35 5*   PLATELETS Thousands/uL 140* 130*   POTASSIUM mmol/L 4 2 4 9   CHLORIDE mmol/L 97 102   CO2 mmol/L 25 25   BUN mg/dL 40* 47*   CREATININE mg/dL 6 52* 7 11*   CALCIUM mg/dL 8 6 9 1   MAGNESIUM mg/dL 2 1  --

## 2022-06-12 PROBLEM — N18.6 ESRD (END STAGE RENAL DISEASE) (HCC): Status: ACTIVE | Noted: 2022-06-10

## 2022-06-12 NOTE — DISCHARGE SUMMARY
[facility]  Discharge- Charlene Morales 1960, 58 y o  male MRN: [id number]  Unit/Bed#: S -01 Encounter: [id number]  Primary Care Provider: Jc York DO   Date and time admitted to hospital: 6/10/2022 10:03 AM    * Elevated troponin with chest pain  Assessment & Plan  · Known multivessel CAD  Thought to be 2/2 recent PCI of LAD and LCX on 6/7/22 in the setting of poor clearance of cardiac enzyme in ESRD  No evidence of ACS/stent thrombosis  · Appreciate cardiology recommendations  · Follow-up with Cardiology team in [place] for plans to have staged PCI of the RCA  · Patient currently without chest pains  · Medical management optimized in the interim with increase in Coreg, addition of Imdur and prn sublingual nitro  No further inpatient recommendations at this time      ESRD (end stage renal disease) ([facility] [address] )  Assessment & Plan  · Continue outpatient HD    Hypertension  Assessment & Plan  · Continue home regimen with addition of Imdur    Type 2 diabetes mellitus with chronic kidney disease on chronic dialysis, with long-term current use of insulin [facility])  Assessment & Plan  Lab Results   Component Value Date    HGBA1C 6 0 (H) 04/30/2022       Recent Labs     06/10/22  2034 06/10/22  2204 06/11/22  0851 06/11/22  1124   POCGLU 185* 201* 191* 208*       Blood Sugar Average: Last 72 hrs:  · Resume home regimen        Medical Problems             Resolved Problems  Date Reviewed: 6/12/2022   None               Discharging Physician / Practitioner: Angeles Roberts MD  PCP: Jc York DO  Admission Date:   Admission Orders (From admission, onward)     Ordered        06/10/22 1252  Inpatient Admission  Once                      Discharge Date: 06/12/22    Consultations During Hospital Stay:  · Cardiology   · Nephrology    Procedures Performed:   · HD on 6/10/22    Significant Findings / Test Results:   · None    Incidental Findings:   · None    Test Results Pending at Discharge (will require follow up): · None     Outpatient Tests Requested:  · None    Complications:  None    Reason for Admission: Chest pain    Hospital Course:     Please see above list of diagnoses and related plan for additional information  Condition at Discharge: good    Discharge Day Visit / Exam:   Subjective:  Patient seen and examined at bedside  No acute events overnight  Patient denies any chest pains  He is hopeful to be discharged this morning  Vitals: Blood Pressure: 141/96 (06/11/22 0752)  Pulse: 68 (06/11/22 0752)  Temperature: 98 1 °F (36 7 °C) (06/11/22 0752)  Temp Source: Oral (06/11/22 0752)  Respirations: 20 (06/11/22 0752)  Height: 5' 9" (175 3 cm) (06/10/22 1011)  Weight - Scale: 102 kg (224 lb) (06/11/22 0600)  SpO2: 98 % (06/11/22 0752)  Exam:   Physical Exam  Cardiovascular:      Rate and Rhythm: Normal rate and regular rhythm  Pulmonary:      Effort: No respiratory distress  Breath sounds: No wheezing, rhonchi or rales  Abdominal:      General: There is no distension  Tenderness: There is no abdominal tenderness  Musculoskeletal:         General: No swelling  Skin:     General: Skin is warm and dry  Neurological:      Mental Status: He is alert  Mental status is at baseline  Discussion with Family: Patient declined call to   Discharge instructions/Information to patient and family:   See after visit summary for information provided to patient and family  Provisions for Follow-Up Care:  See after visit summary for information related to follow-up care and any pertinent home health orders  Disposition:   Home    Planned Readmission: None     Discharge Statement:  I spent 28 minutes discharging the patient  This time was spent on the day of discharge  I had direct contact with the patient on the day of discharge   Greater than 50% of the total time was spent examining patient, answering all patient questions, arranging and discussing plan of care with patient as well as directly providing post-discharge instructions  Additional time then spent on discharge activities  Discharge Medications:  See after visit summary for reconciled discharge medications provided to patient and/or family        **Please Note: This note may have been constructed using a voice recognition system**

## 2022-06-12 NOTE — ASSESSMENT & PLAN NOTE
· Known multivessel CAD  Thought to be 2/2 recent PCI of LAD and LCX on 6/7/22 in the setting of poor clearance of cardiac enzyme in ESRD  No evidence of ACS/stent thrombosis  · Appreciate cardiology recommendations  · Follow-up with Cardiology team in [place] for plans to have staged PCI of the RCA  · Patient currently without chest pains  · Medical management optimized in the interim with increase in Coreg, addition of Imdur and prn sublingual nitro  No further inpatient recommendations at this time

## 2022-06-12 NOTE — ASSESSMENT & PLAN NOTE
Lab Results   Component Value Date    HGBA1C 6 0 (H) 04/30/2022       Recent Labs     06/10/22  2034 06/10/22  2204 06/11/22  0851 06/11/22  1124   POCGLU 185* 201* 191* 208*       Blood Sugar Average: Last 72 hrs:  · Resume home regimen

## 2022-06-13 ENCOUNTER — TRANSITIONAL CARE MANAGEMENT (OUTPATIENT)
Dept: INTERNAL MEDICINE CLINIC | Facility: CLINIC | Age: 62
End: 2022-06-13

## 2022-06-15 ENCOUNTER — OFFICE VISIT (OUTPATIENT)
Dept: CARDIOLOGY CLINIC | Facility: CLINIC | Age: 62
End: 2022-06-15
Payer: MEDICARE

## 2022-06-15 VITALS
SYSTOLIC BLOOD PRESSURE: 146 MMHG | DIASTOLIC BLOOD PRESSURE: 80 MMHG | WEIGHT: 232 LBS | OXYGEN SATURATION: 97 % | HEART RATE: 71 BPM | TEMPERATURE: 97 F | BODY MASS INDEX: 34.36 KG/M2 | HEIGHT: 69 IN

## 2022-06-15 DIAGNOSIS — E78.5 DYSLIPIDEMIA: ICD-10-CM

## 2022-06-15 DIAGNOSIS — Z79.4 TYPE 2 DIABETES MELLITUS WITH HYPERGLYCEMIA, WITH LONG-TERM CURRENT USE OF INSULIN (HCC): ICD-10-CM

## 2022-06-15 DIAGNOSIS — Z95.5 HISTORY OF CORONARY ANGIOPLASTY WITH INSERTION OF STENT: ICD-10-CM

## 2022-06-15 DIAGNOSIS — E11.42 DIABETIC POLYNEUROPATHY ASSOCIATED WITH TYPE 2 DIABETES MELLITUS (HCC): ICD-10-CM

## 2022-06-15 DIAGNOSIS — I69.30 HISTORY OF ISCHEMIC CEREBROVASCULAR ACCIDENT (CVA) WITH RESIDUAL DEFICIT: ICD-10-CM

## 2022-06-15 DIAGNOSIS — E11.65 TYPE 2 DIABETES MELLITUS WITH HYPERGLYCEMIA, WITH LONG-TERM CURRENT USE OF INSULIN (HCC): ICD-10-CM

## 2022-06-15 DIAGNOSIS — N18.6 ESRD ON DIALYSIS (HCC): ICD-10-CM

## 2022-06-15 DIAGNOSIS — Z99.2 ESRD ON DIALYSIS (HCC): ICD-10-CM

## 2022-06-15 DIAGNOSIS — I10 ESSENTIAL HYPERTENSION: ICD-10-CM

## 2022-06-15 DIAGNOSIS — I25.10 CAD (CORONARY ARTERY DISEASE): ICD-10-CM

## 2022-06-15 PROCEDURE — 99214 OFFICE O/P EST MOD 30 MIN: CPT | Performed by: INTERNAL MEDICINE

## 2022-06-15 PROCEDURE — 93000 ELECTROCARDIOGRAM COMPLETE: CPT | Performed by: INTERNAL MEDICINE

## 2022-06-15 RX ORDER — CARVEDILOL 12.5 MG/1
12.5 TABLET ORAL 2 TIMES DAILY WITH MEALS
Qty: 60 TABLET | Refills: 5 | Status: SHIPPED | COMMUNITY
Start: 2022-06-15 | End: 2022-07-15

## 2022-06-15 NOTE — PROGRESS NOTES
Consultation - Cardiology Office  [facility]  Ezra Tipton 58 y o  male MRN: [id number]  : 1960  Unit/Bed#:  Encounter: [id number]      Assessment:     1  CAD (coronary artery disease)    2  ESRD on dialysis ([facility] [place] [facility] )    3  Dyslipidemia    4  Type 2 diabetes mellitus with hyperglycemia, with long-term current use of insulin (HCC)    5  History of ischemic cerebrovascular accident (CVA) with residual deficit    6  Diabetic polyneuropathy associated with type 2 diabetes mellitus ([facility] [place] [facility] )    7  Essential hypertension    8  History of coronary angioplasty with insertion of stent        Discussion summary and Plan:    1  History of end-stage renal disease on dialysis  Patient get dialysis on   He is looking to have transplant  He is getting his dialysis by AV fistula on the left arm  No issues during dialysis  2  Recent admission to [facility] on 06/10/2022 with episodes of chest pain  It was felt most likely chest pain was related to noncardiac causes  His blood pressure was uncontrolled  He was put on increased dose of Coreg and Imdur  Family feels that blood pressure at home is generally lower they will monitor blood pressure closely based on that if blood pressure is elevated and is systolic more than 065 they will give Coreg additional 12 5 mg  Okay to use Imdur 30 mg daily  3  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  Patient on medical Rx he has recovered lot of ambulatory function  Some memory issues no other problems  Continue dual platelet therapy  4  Dyslipidemia  Continue high intensity statins  He is on Lipitor 40 mg    5  Essential hypertension  His blood pressure is still borderline controlled  Family feels it may be related to anxiety  He is on Coreg 12 5 twice a day, okay to take Imdur 30 mg and they will give additional dose of Coreg if blood pressure is still more than 310-430 systolic    Today heart rate 71 beats per minute    6  Obesity with BMI around 35  Advised to lose weight  7  Coronary artery disease status post stenting of obtuse marginal and [address] in May 2022  Later on on 2nd opinion in [place] he had angioplasty of LAD as well as circumflex done  The vessels were heavily calcified he need intracoronary lithotripsy  Procedure was done on June 2022 and he had a successful result  He is scheduled to have repeat angioplasty of his distal RCA lesion on 06/23/2022  Continue dual platelet therapy  EKG shows no changes      8  Diabetes mellitus need to better control      9  RBBB which is chronic  Continue current Rx strongly advised him to lose weight  And continue current Rx  For now we continue aggressive medical Rx  He need to control his blood pressure and cholesterol changes dietary habits  If he has any change in symptoms he will call us and we will consider it  Thank you for your consultation  If you have any question please call me at [phone]    Counseling :  A description of the counseling  Goals and Barriers  Patient's ability to self care: Yes  Medication side effect reviewed with patient in detail and all their questions answered to their satisfaction  Primary Care Physician : Pedro Cook DO      HPI :     Azul Mcintosh is a 58y o  year old male who was was initially seen by us many years ago was recently [facility] with near syncopal episode  Patient has past medical significant for chronic kidney disease, diabetes mellitus, diarrhea, history of stroke who presents with unresponsive episode  Patient was going to flea market in his car with his family, he was not driving  Family noted him to be very sweaty and pale, and became unresponsive  Patient was brought to the emergency department and noted to have blood glucose of 40  Patient is not doing well  He denies any new complaints    His echo from [facility] reviewed  During workup he was found to have CKD stage 3/4  He now follows up with [facility]  He is doing well  His kidney function has stabilized  He does occasionally get short of breath when he walks  After his stroke is not that active  He is having lot of problem with his urine incontinent  He is scheduled to have a procedure done by urology  He may need clearance for that procedure  minute     09/21/2021  Above reviewed  Patient came for follow-up he is doing well from cardiac point of view  He had now AV fistula in the left arm and he get dialysis through that  He has no problem getting dialysis  He is on dialysis on Monday Wednesday Friday  He has medical history significant for hypertension, diabetes mellitus, history of stroke and abnormal EKG  He has a nonischemic nuclear in May of 2021  He is trying hard to lose weight so that he can be on the list   He has no nausea no vomiting no fever no chills  He has some itchiness and has some open wound he is using cream   No other cardiovascular issues today vitals has been stable  Labs reviewed  01/20/2022  Above reviewed  Patient was just discharged from the hospital   He was admitted after he felt dizzy and lightheadedness yesterday after had complete dialysis  He has medical history significant for end-stage renal disease on dialysis, hypertension, history of stroke, type 2 diabetes mellitus on insulin and history of nonischemic nuclear stress May 2021  His trying hard to lose his weight so that he can be on the our transplant list   EKG yesterday shows new T-wave inversion in lead 2 3 AVF and V5 to V6 and troponin was slightly elevated however delta was 0  He was dizzy and lightheadedness  Today he denies any chest pain any shortness of breath he feels back to baseline  Discharge team as already ordered nuclear stress test on him    His initial EKG at presentation shows he had T-wave inversion inferior leads which has normalized later on  His initial troponin was 62 delta was 0  He was noted to be orthostatic while he was in the ED  His standing blood pressure was 117/59 and his sitting blood pressure was 130/65  He says and his wife was present there that he was given IV fluids and he felt better  He feels absolutely back to baseline no symptoms  05/05/2022    Above reviewed patient was recently admitted to [facility] with episodes of chest pain very high blood pressure and had a positive troponin  Underwent cardiac catheterization found to have a small obtuse marginal around 90% stenosis which was successfully stented  He does have a right coronary artery RPL small vessel disease for which he is being managed with medical therapy  He had a medical history significant for end-stage renal disease, on dialysis, hypertension, history of stroke, history of type 2 diabetes mellitus, history of abnormal nuclear stress test   She is trying hard to lose weight but he is not very successful but he is working on it his EKG shows he has sinus rhythm right bundle-branch block and T-wave inversions  His blood pressure has been acceptable today he came with his wife and his son was over the phone  No nausea no vomiting no fever no chills he feels lot better since he went home and his radial site is healing well he has no headaches and no chest pain  He gets very easily anxious  06/15/2022  Above reviewed  Patient has medical history significant for coronary artery disease status post angioplasty of obtuse marginal in April 2022 and recently he was in [facility] in [place] for 2nd opinion where he had stent placed in his proximal LAD and proximal circumflex and now the plan to do his distal right angioplasty  Patient was recently in the [address] [place] [address] with episodes of chest pain and it was felt noncardiac chest pain there was no evidence of any stent thrombosis    The stent size in proximal LAD was 4 5 mm which was post dilated to 5 mm  And stent size in the circumflex was 3 0 mm  He had moderate to severe calcification they have to do intracoronary lithotripsy  Patient is known to have also have end-stage renal disease on dialysis, moderate aortic stenosis with mildly dilated aortic root, type 2 diabetes mellitus, history of stroke, brittle diabetes mellitus and chronic RBBB  He feels well  Recently in the hospital his blood pressure was high and his Coreg was increased to 25 and he was put on Imdur  Family is reluctant to take increase medications  Today his heart rate is 71 beats per minute  His groin has healed well  He came with his son  His son feels his blood pressure generally lower at home he is very nervous air  He is reluctant to increase his medications  At patient and family's request the medication he is not taking were removed from the list         Review of Systems   Constitutional: Negative for activity change, chills, diaphoresis, fever and unexpected weight change  HENT: Negative for congestion  Eyes: Negative for discharge and redness  Respiratory: Negative for cough, chest tightness, shortness of breath and wheezing  Cardiovascular: Negative  Negative for chest pain, palpitations and leg swelling  Gastrointestinal: Negative for abdominal pain, diarrhea and nausea  Endocrine: Negative  Genitourinary: Negative for decreased urine volume and urgency  Musculoskeletal: Negative  Negative for arthralgias, back pain and gait problem  Skin: Negative for rash and wound  Allergic/Immunologic: Negative  Neurological: Negative for dizziness, seizures, syncope, weakness, light-headedness and headaches  Hematological: Negative  Psychiatric/Behavioral: Negative for agitation and confusion  The patient is nervous/anxious          Historical Information   Past Medical History:   Diagnosis Date    Cerebrovascular accident (CVA) due to thrombosis of left middle cerebral artery ([facility] ) 7/29/2018    Chronic kidney disease     [facility]     GERD (gastroesophageal reflux disease)     Hypercholesteremia     Hyperlipidemia     Hypertension     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    Neuropathy     Obesity     Osteomyelitis ([facility] )     last assessed 11/4/16    PVC's (premature ventricular contractions)     sees cardiology Dr Celia camargo    Stroke [facility])     last weeof July 2018 [address] [facility]    TIA (transient ischemic attack) 10/28/2018     Past Surgical History:   Procedure Laterality Date    ABDOMINAL SURGERY      CARDIAC CATHETERIZATION N/A 5/2/2022    Procedure: Cardiac Coronary Angiogram;  Surgeon: Lj Wolfe MD;  Location: [facility] CARDIAC CATH LAB; Service: Cardiology    CARDIAC CATHETERIZATION N/A 5/2/2022    Procedure: Cardiac pci;  Surgeon: Lj Wolfe MD;  Location: [facility] CARDIAC CATH LAB; Service: Cardiology    CHOLECYSTECTOMY      Percutaneous    COLONOSCOPY      CYSTOSCOPY      OTHER SURGICAL HISTORY      "stimulator to control bowel movements"    PA ESOPHAGOGASTRODUODENOSCOPY TRANSORAL DIAGNOSTIC N/A 9/27/2016    Procedure: ESOPHAGOGASTRODUODENOSCOPY (EGD); Surgeon: Michelle Ling MD;  Location: [facility] GI LAB;   Service: Gastroenterology    PA LAP,CHOLECYSTECTOMY N/A 2/29/2016    Procedure: LAPAROSCOPIC CHOLECYSTECTOMY ;  Surgeon: Alexis Schaefer DO;  Location: [facility] Main OR;  Service: General    ROTATOR CUFF REPAIR Right     TOE AMPUTATION Right 10/28/2016    Procedure: 3RD TOE AMPUTATION ;  Surgeon: Oneida Garcia DPM;  Location: [facility] Main OR;  Service:      Social History     Substance and Sexual Activity   Alcohol Use Not Currently     Social History     Substance and Sexual Activity   Drug Use No     Social History     Tobacco Use   Smoking Status Never Smoker   Smokeless Tobacco Never Used     Family History:   Family History   Problem Relation Age of Onset    Leukemia Mother     Liver disease Mother  Lung cancer Mother         heavy smoker - 3 ppd    Heart disease Father     Liver disease Father     Multiple myeloma Sister     Breast cancer Sister     Urolithiasis Family     Alcohol abuse Neg Hx     Depression Neg Hx     Drug abuse Neg Hx     Substance Abuse Neg Hx     Mental illness Neg Hx        Meds/Allergies     No Known Allergies    Current Outpatient Medications:     aspirin (ECOTRIN LOW STRENGTH) 81 mg EC tablet, Take 81 mg by mouth daily Resume on 8/14  , Disp: , Rfl:     atorvastatin (LIPITOR) 40 mg tablet, TAKE 1 TABLET BY MOUTH DAILY WITH DINNER, Disp: 90 tablet, Rfl: 1    BD Pen Needle Adina U/F 32G X 4 MM MISC, USE TO INJECT INSULIN 4 TIMES DAILY, Disp: 400 each, Rfl: 1    Blood Glucose Monitoring Suppl (True Metrix Meter) w/Device KIT, Use to test blood sugars 3 times daily, Disp: 1 kit, Rfl: 0    Blood Pressure Monitoring (BLOOD PRESSURE CUFF) MISC, Use to check blood pressure before taking blood pressure medication and 1 hour after and follow instructions provided in discharge instructions based on the readings  , Disp: 1 each, Rfl: 0    calcium acetate (CALPHRON) 667 mg, TAKE 2 TABLETS BY MOUTH THREE TIMES DAILY WITH MEALS, Disp: , Rfl:     carvedilol (COREG) 12 5 mg tablet, Take 1 tablet (12 5 mg total) by mouth 2 (two) times a day with meals, Disp: 60 tablet, Rfl: 5    Cholecalciferol (Vitamin D3) 1 25 MG (82082 UT) CAPS, TAKE 1 CAPSULE BY MOUTH ONE TIME PER WEEK, Disp: 12 capsule, Rfl: 2    clopidogrel (PLAVIX) 75 mg tablet, Take 1 tablet (75 mg total) by mouth in the morning , Disp: 90 tablet, Rfl: 2    insulin lispro (HumaLOG KwikPen) 100 units/mL injection pen, INJECT 4 UNITS 3 TIMES A DAY WITH MEALS PLUS SCALE (max daily dose 42 units), Disp: 45 mL, Rfl: 1    Insulin Syringe-Needle U-100 (B-D INS SYRINGE 0 5CC/30GX1/2") 30G X 1/2" 0 5 ML MISC, Inject under the skin 4 (four) times a day, Disp: 360 each, Rfl: 1    Lancets Ultra Thin 30G MISC, Use 3 times a day, Disp: 300 each, Rfl: 0    Lantus 100 UNIT/ML subcutaneous injection, INJECT 14 UNITS UNDER THE SKIN DAILY, Disp: 10 mL, Rfl: 0    ONETOUCH DELICA LANCETS 97R MISC, by Does not apply route 3 (three) times a day, Disp: 270 each, Rfl: 1    sertraline (Zoloft) 25 mg tablet, Take 1/2 tablet every day at bedtime for 4 days, then increase to 1 tablet every day at bedtime, Disp: 30 tablet, Rfl: 1    TORSEMIDE PO, Take 50 mg by mouth Pt takes 50 mg daily on non- dialysis days: sat, sun, Tues, and thrusday  Pt takes 10 mg of torsemide daily on dialysis days m- w- f  , Disp: , Rfl:     True Metrix Blood Glucose Test test strip, USE 1 EACH 3 (THREE) TIMES A DAY USE AS INSTRUCTED, Disp: 100 strip, Rfl: 5    Continuous Blood Gluc  (DEXCOM G6 ) [facility], Use as directed for continuous glucose monitoring, Disp: 1 Device, Rfl: 0    Continuous Blood Gluc Sensor (DEXCOM G6 SENSOR) MISC, Use as directed for continuous glucose monitoring  Change every 10 days, Disp: 1 each, Rfl: 11    Continuous Blood Gluc Transmit (DEXCOM G6 TRANSMITTER) MISC, Use as directed for continuous glucose monitoring-Change every 3 months, Disp: 1 each, Rfl: 3    isosorbide mononitrate (IMDUR) 30 mg 24 hr tablet, Take 1 tablet (30 mg total) by mouth daily (Patient not taking: Reported on 6/15/2022), Disp: 30 tablet, Rfl: 0    Vitals: Blood pressure 146/80, pulse 71, temperature (!) 97 °F (36 1 °C), height 5' 9" (1 753 m), weight 105 kg (232 lb), SpO2 97 %  ?  Body mass index is 34 26 kg/m²  Vitals:    06/15/22 0941   Weight: 105 kg (232 lb)     BP Readings from Last 3 Encounters:   06/15/22 146/80   06/11/22 141/96   06/03/22 (!) 197/76         Physical Exam  Constitutional:       General: He is not in acute distress  Appearance: He is well-developed  He is not diaphoretic  Neck:      Thyroid: No thyromegaly  Vascular: No JVD  Trachea: No tracheal deviation     Cardiovascular:      Rate and Rhythm: Normal rate and regular rhythm  Heart sounds: S1 normal and S2 normal  Heart sounds not distant  Murmur heard  Systolic (ejection) murmur is present with a grade of 2/6  No friction rub  No gallop  No S3 or S4 sounds  Pulmonary:      Effort: Pulmonary effort is normal  No respiratory distress  Breath sounds: Normal breath sounds  No wheezing or rales  Chest:      Chest wall: No tenderness  Abdominal:      General: Bowel sounds are normal  There is no distension  Palpations: Abdomen is soft  Tenderness: There is no abdominal tenderness  Musculoskeletal:         General: No deformity  Cervical back: Neck supple  Skin:     General: Skin is warm and dry  Coloration: Skin is not pale  Findings: No rash  Neurological:      Mental Status: He is alert and oriented to person, place, and time  Psychiatric:         Behavior: Behavior normal          Judgment: Judgment normal            Diagnostic Studies Review Cardio:  Nuclear stress test   Nuclear stress test 10/14/2019 were nonischemic EF around 50%  Echo Doppler  Echo Doppler done on 10/06/2020 shows EF 49%, grade 2 diastolic dysfunction, moderate LVH, moderate mitral annulus calcification with mild MR mild-to-moderate aortic stenosis and mild aortic regurgitation       Cardiac catheterization done in [facility] on 06/09/2022  Prox LAD lesion   Stent (Also treats lesions: Mid LAD)   A drug-eluting stent was successfully placed  The vessel was pre-dilated with a CATHETER BALLOON NC QUANTUM APEX MR 15 X 3 0MM ([id number])  The stent used was a STENT CORONARY EVEROLIMUS-ELUTING 38 X 4MM SYNERGY XD  The stent was post dilated with a CATHETER BALLOON NC QUANTUM APEX MR 8 X 5 0MM ([id number])  PCI result was optimized using imaging guidance  Post-Intervention Lesion Assessment   The intervention was successful  The guidewire crossed the lesion  Post-intervention JALIL flow is 3  36mm of vessel were treated   There were no complications  Ultrasound (IVUS) was performed  Minimum stent area: 9 3 mm²  Intravascular imagingdemonstrates good stent expansion and apposition  There is a 0% residual stenosis post intervention    RPDA lesion is 80% stenosed    RPAV lesion is 99% stenosed    Successful PCI of prox to mid LAD and mid Circumflex    Prox LAD lesion is 70% stenosed    Mid LAD lesion is 80% stenosed    Mid Cx lesion is 70% stenosed    A drug-eluting stent  was successfully deployed in prox to mid LAD    A drug-eluting stent  was successfully deployed in mid Circumflex    A drug-eluting stent  Was successfully deployed    Bare metal stent was selected due to other  RECOMMENDATIONS:  1  ASA indefinitely  2  Clopidogrel 75 PO for 1 year or longer as indicated  Statin indefinitely  3  Return for stage completion of RCA (subtotal occlusion of RPL) in 2 weeks     EKG:  EKG today shows normal sinus rhythm nonspecific ST changes heart rate 80 beats per minute  Twelve lead EKG 01/15/2020 shows normal sinus rhythm LVH by voltage  ST abnormality in inferior leads certainly cannot rule ischemia but had a nonischemic nuclear  Twelve lead EKG 03/18/2021 shows normal sinus rhythm heart rate 76 beats per minute nonspecific ST changes in inferior lead no change from old EKG  Twelve lead EKG 04/09/2021 shows normal sinus rhythm bifascicular block heart rate 87 beats per minute  As compared to previous EKG patient has not developed bundle branch block  Twelve lead EKG 05/05/2022 shows normal sinus rhythm right bundle branch block heart rate 81 beats per minute no change from previous EKG  Twelve lead EKG 06/15/2022 shows sinus rhythm heart rate 71 beats per minute  Left axis deviation with RBBB              Lab Review   Lab Results   Component Value Date    WBC 6 26 06/11/2022    HGB 11 3 (L) 06/11/2022    HCT 37 4 06/11/2022     (H) 06/11/2022    RDW 15 0 06/11/2022     (L) 06/11/2022     BMP:  Lab Results   Component Value Date    SODIUM 135 06/11/2022    K 4 2 06/11/2022    CL 97 06/11/2022    CO2 25 06/11/2022    ANIONGAP 9 01/03/2016    BUN 40 (H) 06/11/2022    CREATININE 6 52 (H) 06/11/2022    GLUC 103 06/11/2022    GLUF 132 (H) 06/02/2022    CALCIUM 8 6 06/11/2022    CORRECTEDCA 7 6 (L) 06/03/2022    EGFR 8 06/11/2022    MG 2 1 06/11/2022     LFT:  Lab Results   Component Value Date    AST 14 06/10/2022    ALT 13 06/10/2022    ALKPHOS 85 06/10/2022    TP 7 1 06/10/2022    ALB 4 0 06/10/2022      Lab Results   Component Value Date    OBN6KUXGSNDL 1 276 04/06/2022     Lab Results   Component Value Date    HGBA1C 6 0 (H) 04/30/2022     Lipid Profile:   Lab Results   Component Value Date    CHOLESTEROL 80 06/02/2022    HDL 27 (L) 06/02/2022    LDLCALC 26 06/02/2022    TRIG 133 06/02/2022     Lab Results   Component Value Date    CHOLESTEROL 80 06/02/2022    CHOLESTEROL 84 04/30/2022     Lab Results   Component Value Date    CKTOTAL 385 (H) 12/30/2020    CKMB 2 0 12/30/2020    CKMBINDEX <1 0 12/30/2020    TROPONINI <0 02 09/08/2021     Lab Results   Component Value Date    NTBNP 1,859 (H) 01/02/2022            Dr Wyatt Solorzano MD [facility]      "This note has been constructed using a voice recognition system  Therefore there may be syntax, spelling, and/or grammatical errors   Please call if you have any questions  "

## 2022-06-16 DIAGNOSIS — F41.8 ANXIETY ASSOCIATED WITH DEPRESSION: ICD-10-CM

## 2022-06-16 RX ORDER — SERTRALINE HYDROCHLORIDE 25 MG/1
25 TABLET, FILM COATED ORAL
Qty: 90 TABLET | Refills: 1 | Status: SHIPPED | OUTPATIENT
Start: 2022-06-16

## 2022-06-16 NOTE — PHYSICIAN ADVISOR
Current patient class: Inpatient  The patient is currently on Hospital Day: 2 at [address] [facility]      The patient was admitted to the hospital  on 6/10/22 at 12:52 PM for the following diagnosis:  Dizziness [R42]  Cardiac chest pain [R07 9]  Elevated troponin [R77 8]  ESRD on dialysis ([facility] [address] ) [N18 6, Z99 2]  Headache [R51 9]     After review of the relevant documentation, labs, vital signs and test results, this is a PROVIDER LIABLE case  In this particular case the patient was admitted to the hospital as an inpatient  The patient however failed to satisfy the 2 midnight benchmark and closer scrutiny of the case was warranted  After review of the patient presentation and relevant labs the patient was most appropriate for observation or outpatient class at the time of admission  Given that this patient has already been discharged prior to this review they become a provider liable case  Rationale is as follows: The patient is a 58 yrs   Male who presented to the ED at 6/10/2022 10:03 AM with a chief complaint of Weakness - Generalized (Patient stated that he woke two hours ago and had started to have generalized weakness  He is due for dialysis today, last dialysis appt was Wednesday  He started zoloft 12 5 mg as well yesterday  Last week had an angiogram last week in [place] as well  He mainly complains of headache, and bodyaches  Denies NVD)  Patient came in because of missed dialysis  The patient did get dialysis treatment and then he improved and was discharged  Given the above the patient was most appropriate for observation status on admission    This case is provider liable    The patients vitals on arrival were   ED Triage Vitals   Temperature Pulse Respirations Blood Pressure SpO2   06/10/22 1011 06/10/22 1011 06/10/22 1011 06/10/22 1011 06/10/22 1011   98 7 °F (37 1 °C) 70 16 151/94 97 %      Temp Source Heart Rate Source Patient Position - Orthostatic VS BP Location FiO2 (%) 06/10/22 1315 06/10/22 1011 06/10/22 1011 06/10/22 1011 --   Oral Monitor Sitting Right arm       Pain Score       06/10/22 1027       6           Past Medical History:   Diagnosis Date    Cerebrovascular accident (CVA) due to thrombosis of left middle [facility] artery ([facility] ) 7/29/2018    Chronic kidney disease     Diabetes mellitus ([facility] )     GERD (gastroesophageal reflux disease)     Hypercholesteremia     Hyperlipidemia     Hypertension     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    Neuropathy     Obesity     Osteomyelitis ([facility] )     last assessed 11/4/16    PVC's (premature ventricular contractions)     sees cardiology Dr Parker Doing camargo    Stroke [facility])     last weeof July 2018 [address] [facility]    TIA (transient ischemic attack) 10/28/2018     Past Surgical History:   Procedure Laterality Date    ABDOMINAL SURGERY      CARDIAC CATHETERIZATION N/A 5/2/2022    Procedure: Cardiac Coronary Angiogram;  Surgeon: Morris Mccarthy MD;  Location: [facility] CARDIAC CATH LAB; Service: Cardiology    CARDIAC CATHETERIZATION N/A 5/2/2022    Procedure: Cardiac pci;  Surgeon: Morris Mccarthy MD;  Location: [facility] CARDIAC CATH LAB; Service: Cardiology    CHOLECYSTECTOMY      Percutaneous    COLONOSCOPY      CYSTOSCOPY      OTHER SURGICAL HISTORY      "stimulator to control bowel movements"    UT ESOPHAGOGASTRODUODENOSCOPY TRANSORAL DIAGNOSTIC N/A 9/27/2016    Procedure: ESOPHAGOGASTRODUODENOSCOPY (EGD); Surgeon: Sanjeev Naqvi MD;  Location: AN GI LAB;   Service: Gastroenterology    UT LAP,CHOLECYSTECTOMY N/A 2/29/2016    Procedure: LAPAROSCOPIC CHOLECYSTECTOMY ;  Surgeon: Austen Carl DO;  Location: [facility] Main OR;  Service: General    ROTATOR CUFF REPAIR Right     TOE AMPUTATION Right 10/28/2016    Procedure: 3RD TOE AMPUTATION ;  Surgeon: Jesse Ramirez DPM;  Location: [facility] Main OR;  Service:            Consults have been placed to:   IP CONSULT TO NEPHROLOGY  IP CONSULT TO CARDIOLOGY    Vitals: 06/10/22 2202 06/11/22 0524 06/11/22 0600 06/11/22 0752   BP: 143/65   141/96   BP Location: Right arm   Right arm   Pulse: 68   68   Resp: 16   20   Temp: 98 2 °F (36 8 °C)   98 1 °F (36 7 °C)   TempSrc: Oral   Oral   SpO2: 94%   98%   Weight:  102 kg (224 lb) 102 kg (224 lb)    Height:           Most recent labs:    No results for input(s): WBC, HGB, HCT, PLT, K, NA, CALCIUM, BUN, CREATININE, LIPASE, AMYLASE, INR, TROPONINI, CKTOTAL, AST, ALT, ALKPHOS, BILITOT in the last 72 hours  Scheduled Meds:  Continuous Infusions:No current facility-administered medications for this encounter  PRN Meds:      Surgical procedures (if appropriate):

## 2022-06-20 ENCOUNTER — APPOINTMENT (OUTPATIENT)
Dept: LAB | Facility: CLINIC | Age: 62
End: 2022-06-20
Payer: MEDICARE

## 2022-06-20 ENCOUNTER — TRANSCRIBE ORDERS (OUTPATIENT)
Dept: LAB | Facility: CLINIC | Age: 62
End: 2022-06-20

## 2022-06-20 DIAGNOSIS — R07.9 CHEST PAIN, UNSPECIFIED TYPE: Primary | ICD-10-CM

## 2022-06-20 DIAGNOSIS — R77.8 ELEVATED TROPONIN LEVEL: ICD-10-CM

## 2022-06-20 DIAGNOSIS — E11.65 TYPE 2 DIABETES MELLITUS WITH HYPERGLYCEMIA, WITH LONG-TERM CURRENT USE OF INSULIN (HCC): ICD-10-CM

## 2022-06-20 DIAGNOSIS — I77.9 DISEASE OF ARTERY (HCC): ICD-10-CM

## 2022-06-20 DIAGNOSIS — Z79.4 TYPE 2 DIABETES MELLITUS WITH HYPERGLYCEMIA, WITH LONG-TERM CURRENT USE OF INSULIN (HCC): ICD-10-CM

## 2022-06-20 DIAGNOSIS — I10 ESSENTIAL HYPERTENSION, MALIGNANT: ICD-10-CM

## 2022-06-20 DIAGNOSIS — R07.9 CHEST PAIN, UNSPECIFIED TYPE: ICD-10-CM

## 2022-06-20 LAB
ALBUMIN SERPL BCP-MCNC: 3.6 G/DL (ref 3.5–5)
ALP SERPL-CCNC: 118 U/L (ref 46–116)
ALT SERPL W P-5'-P-CCNC: 25 U/L (ref 12–78)
ANION GAP SERPL CALCULATED.3IONS-SCNC: 7 MMOL/L (ref 4–13)
AST SERPL W P-5'-P-CCNC: 16 U/L (ref 5–45)
BASOPHILS # BLD AUTO: 0.03 THOUSANDS/ΜL (ref 0–0.1)
BASOPHILS NFR BLD AUTO: 1 % (ref 0–1)
BILIRUB DIRECT SERPL-MCNC: 0.12 MG/DL (ref 0–0.2)
BILIRUB SERPL-MCNC: 0.39 MG/DL (ref 0.2–1)
BUN SERPL-MCNC: 59 MG/DL (ref 5–25)
CALCIUM SERPL-MCNC: 9.3 MG/DL (ref 8.3–10.1)
CHLORIDE SERPL-SCNC: 105 MMOL/L (ref 100–108)
CHOLEST SERPL-MCNC: 92 MG/DL
CK SERPL-CCNC: 160 U/L (ref 39–308)
CO2 SERPL-SCNC: 24 MMOL/L (ref 21–32)
CREAT SERPL-MCNC: 7.93 MG/DL (ref 0.6–1.3)
EOSINOPHIL # BLD AUTO: 0.14 THOUSAND/ΜL (ref 0–0.61)
EOSINOPHIL NFR BLD AUTO: 3 % (ref 0–6)
ERYTHROCYTE [DISTWIDTH] IN BLOOD BY AUTOMATED COUNT: 14.8 % (ref 11.6–15.1)
GFR SERPL CREATININE-BSD FRML MDRD: 6 ML/MIN/1.73SQ M
GLUCOSE P FAST SERPL-MCNC: 149 MG/DL (ref 65–99)
HCT VFR BLD AUTO: 36 % (ref 36.5–49.3)
HDLC SERPL-MCNC: 31 MG/DL
HGB BLD-MCNC: 10.8 G/DL (ref 12–17)
IMM GRANULOCYTES # BLD AUTO: 0.06 THOUSAND/UL (ref 0–0.2)
IMM GRANULOCYTES NFR BLD AUTO: 1 % (ref 0–2)
INR PPP: 1 (ref 0.84–1.19)
LDLC SERPL CALC-MCNC: 25 MG/DL (ref 0–100)
LYMPHOCYTES # BLD AUTO: 1.09 THOUSANDS/ΜL (ref 0.6–4.47)
LYMPHOCYTES NFR BLD AUTO: 20 % (ref 14–44)
MCH RBC QN AUTO: 29.9 PG (ref 26.8–34.3)
MCHC RBC AUTO-ENTMCNC: 30 G/DL (ref 31.4–37.4)
MCV RBC AUTO: 100 FL (ref 82–98)
MONOCYTES # BLD AUTO: 0.54 THOUSAND/ΜL (ref 0.17–1.22)
MONOCYTES NFR BLD AUTO: 10 % (ref 4–12)
NEUTROPHILS # BLD AUTO: 3.72 THOUSANDS/ΜL (ref 1.85–7.62)
NEUTS SEG NFR BLD AUTO: 65 % (ref 43–75)
NONHDLC SERPL-MCNC: 61 MG/DL
NRBC BLD AUTO-RTO: 0 /100 WBCS
PLATELET # BLD AUTO: 122 THOUSANDS/UL (ref 149–390)
PMV BLD AUTO: 11 FL (ref 8.9–12.7)
POTASSIUM SERPL-SCNC: 4.8 MMOL/L (ref 3.5–5.3)
PROT SERPL-MCNC: 7.4 G/DL (ref 6.4–8.2)
PROTHROMBIN TIME: 12.8 SECONDS (ref 11.6–14.5)
RBC # BLD AUTO: 3.61 MILLION/UL (ref 3.88–5.62)
SODIUM SERPL-SCNC: 136 MMOL/L (ref 136–145)
TRIGL SERPL-MCNC: 182 MG/DL
WBC # BLD AUTO: 5.58 THOUSAND/UL (ref 4.31–10.16)

## 2022-06-20 PROCEDURE — 82248 BILIRUBIN DIRECT: CPT

## 2022-06-20 PROCEDURE — 85610 PROTHROMBIN TIME: CPT

## 2022-06-20 PROCEDURE — 80061 LIPID PANEL: CPT

## 2022-06-20 PROCEDURE — 83036 HEMOGLOBIN GLYCOSYLATED A1C: CPT

## 2022-06-20 PROCEDURE — 36415 COLL VENOUS BLD VENIPUNCTURE: CPT

## 2022-06-20 PROCEDURE — 82550 ASSAY OF CK (CPK): CPT

## 2022-06-20 PROCEDURE — 80053 COMPREHEN METABOLIC PANEL: CPT

## 2022-06-20 PROCEDURE — 85025 COMPLETE CBC W/AUTO DIFF WBC: CPT

## 2022-06-21 LAB
EST. AVERAGE GLUCOSE BLD GHB EST-MCNC: 111 MG/DL
HBA1C MFR BLD: 5.5 %

## 2022-06-21 RX ORDER — INSULIN GLARGINE 100 [IU]/ML
14 INJECTION, SOLUTION SUBCUTANEOUS DAILY
Qty: 10 ML | Refills: 0 | OUTPATIENT
Start: 2022-06-21

## 2022-06-29 NOTE — TELEPHONE ENCOUNTER
Spoke with the  wife  I told her he needs to keep his appts or we cant call in anymore meds  The wife stated pt has been In and out of a hospital in [place] for stents put in his heart  He cannot do any of the appts I offered  Next avail is December   He needs like a 9am

## 2022-06-29 NOTE — TELEPHONE ENCOUNTER
Please refill RX until next appt, if he does not follow up for appt, then we wont be able to refill medication, please send letter thanks     Nicole Shaw MD

## 2022-06-30 NOTE — TELEPHONE ENCOUNTER
Called patient and spoke to pts wife and made an appt with Dr Patrica Chaves morning at 36 am and wife aware

## 2022-07-01 ENCOUNTER — TELEPHONE (OUTPATIENT)
Dept: CARDIAC REHAB | Facility: CLINIC | Age: 62
End: 2022-07-01

## 2022-07-01 ENCOUNTER — OFFICE VISIT (OUTPATIENT)
Dept: ENDOCRINOLOGY | Facility: CLINIC | Age: 62
End: 2022-07-01
Payer: MEDICARE

## 2022-07-01 VITALS
DIASTOLIC BLOOD PRESSURE: 68 MMHG | BODY MASS INDEX: 34.41 KG/M2 | WEIGHT: 233 LBS | TEMPERATURE: 97.4 F | SYSTOLIC BLOOD PRESSURE: 124 MMHG | HEART RATE: 78 BPM

## 2022-07-01 DIAGNOSIS — Z79.4 TYPE 2 DIABETES MELLITUS WITH HYPERGLYCEMIA, WITH LONG-TERM CURRENT USE OF INSULIN (HCC): Primary | ICD-10-CM

## 2022-07-01 DIAGNOSIS — N18.5 BENIGN HYPERTENSION WITH CHRONIC KIDNEY DISEASE, STAGE V (HCC): ICD-10-CM

## 2022-07-01 DIAGNOSIS — E11.65 TYPE 2 DIABETES MELLITUS WITH HYPERGLYCEMIA, WITH LONG-TERM CURRENT USE OF INSULIN (HCC): ICD-10-CM

## 2022-07-01 DIAGNOSIS — E11.65 TYPE 2 DIABETES MELLITUS WITH HYPERGLYCEMIA, WITH LONG-TERM CURRENT USE OF INSULIN (HCC): Primary | ICD-10-CM

## 2022-07-01 DIAGNOSIS — I12.0 BENIGN HYPERTENSION WITH CHRONIC KIDNEY DISEASE, STAGE V (HCC): ICD-10-CM

## 2022-07-01 DIAGNOSIS — Z79.4 TYPE 2 DIABETES MELLITUS WITH HYPERGLYCEMIA, WITH LONG-TERM CURRENT USE OF INSULIN (HCC): ICD-10-CM

## 2022-07-01 DIAGNOSIS — E78.2 MIXED HYPERLIPIDEMIA: ICD-10-CM

## 2022-07-01 DIAGNOSIS — E11.65 TYPE 2 DIABETES MELLITUS WITH HYPERGLYCEMIA, UNSPECIFIED WHETHER LONG TERM INSULIN USE (HCC): Chronic | ICD-10-CM

## 2022-07-01 PROCEDURE — 99214 OFFICE O/P EST MOD 30 MIN: CPT | Performed by: INTERNAL MEDICINE

## 2022-07-01 RX ORDER — CALCIUM CITRATE/VITAMIN D3 200MG-6.25
1 TABLET ORAL 3 TIMES DAILY
Qty: 300 STRIP | Refills: 1 | Status: SHIPPED | OUTPATIENT
Start: 2022-07-01

## 2022-07-01 RX ORDER — SEMAGLUTIDE 1.34 MG/ML
INJECTION, SOLUTION SUBCUTANEOUS
Qty: 3 ML | Refills: 5 | Status: SHIPPED | OUTPATIENT
Start: 2022-07-01

## 2022-07-01 NOTE — TELEPHONE ENCOUNTER
Patient assistance forms given to pt and wife for Ozempic  Wife states she will drop off completed form on Tuesday  Physician portion completed and placed on MA's desk for Tuesday

## 2022-07-01 NOTE — PROGRESS NOTES
Efe Haile 58 y o  male MRN: [id number]    Encounter: [id number]      Assessment/Plan     Assessment: This is a 58y o -year-old male with diabetes with hyperglycemia  Plan:    Diagnoses and all orders for this visit:    Type 2 diabetes mellitus with hyperglycemia, with long-term current use of insulin ([facility] )  Patient does not have glucometer, will send a new glucometer to the pharmacy  Discussed to check blood sugar 2-3 times daily  Continue current regimen   Discussed about the option of Ozempic, glucagon like peptide agonist for weight loss, also cardiovascular benefit in setting of coronary artery disease  Patient is agreeable to take Ozempic  Will start 0 25 mg once a week, discussed the side effects and benefits  He was shown by nurse how to use Ozempic injection  Discussed to keep carbohydrate consistent with meals  Discussed to send the log in 2 weeks as once we start Ozempic he may have to stop Humalog with meals, if blood sugars are tightly controlled  Follow-up in 4-6 months  -     glucose blood (True Metrix Blood Glucose Test) test strip; Use 1 each 3 (three) times a day Use as instructed    Mixed hyperlipidemia  Continue statins as per Cardiology    Benign hypertension with chronic kidney disease, stage V ([facility] [address] )  Blood pressure well controlled  Continue current management  Follow with Nephrology for CKD    Type 2 diabetes mellitus with hyperglycemia, unspecified whether long term insulin use (HCC)  Continue current management  -     Insulin Syringe-Needle U-100 (B-D INS SYRINGE 0 5CC/30GX1/2") 30G X 1/2" 0 5 ML MISC; Inject once daily        CC: Diabetes    History of Present Illness     HPI:  Efe Haile is 58 yr old man with medical Hx of Type 2 diabetes, Htn, hyperlipidemia, CKD stage 5 on hemodialysis is here for follow up   Complications of diabetes are, coronary artery disease, CKD stage 5 on hemodialysis    LAst follow up appt- Dec 2021     He checks blood sugars twice daily, blood sugars are in 120-160 mg/dl   Denies hypoglycemia and hyperglycemia causing admission     He was admitted recently for chest pain, followed by Cardiology , was transferred to [place] for cardiac stenting      Current insulin regimen - Lantus 14 units in AM , Lispro 4 units with meals +Scale   He tries to keep carbohydrate consistent with meals    Podiatry- up todate- every 9 weeks   Ophthalmology- up todate     Lab Results   Component Value Date    HGBA1C 5 5 06/20/2022          Review of Systems   Constitutional: Positive for fatigue  Negative for activity change, diaphoresis, fever and unexpected weight change  HENT: Negative  Eyes: Negative for visual disturbance  Respiratory: Negative for cough, chest tightness and shortness of breath  Cardiovascular: Negative for chest pain, palpitations and leg swelling  Gastrointestinal: Negative for abdominal pain, blood in stool, constipation, diarrhea, nausea and vomiting  Endocrine: Negative for cold intolerance, heat intolerance, polydipsia, polyphagia and polyuria  Genitourinary: Negative for dysuria, enuresis, frequency and urgency  Musculoskeletal: Negative for arthralgias and myalgias  Skin: Negative for pallor, rash and wound  Allergic/Immunologic: Negative  Neurological: Negative for dizziness, tremors, weakness and numbness  Hematological: Negative  Psychiatric/Behavioral: Negative          Historical Information   Past Medical History:   Diagnosis Date    Cerebrovascular accident (CVA) due to thrombosis of left middle cerebral artery ([facility] ) 7/29/2018    Chronic kidney disease     Diabetes mellitus ([facility] [facility] )     GERD (gastroesophageal reflux disease)     Hypercholesteremia     Hyperlipidemia     Hypertension     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    Neuropathy     Obesity     Osteomyelitis ([facility] )     last assessed 11/4/16    PVC's (premature ventricular contractions)     sees cardiology Dr Tania Ashraf Stroke [facility])     last weeof July 2018 [address] [place]    TIA (transient ischemic attack) 10/28/2018     Past Surgical History:   Procedure Laterality Date    ABDOMINAL SURGERY      CARDIAC CATHETERIZATION N/A 5/2/2022    Procedure: Cardiac Coronary Angiogram;  Surgeon: Tray New MD;  Location: [facility] CARDIAC CATH LAB; Service: Cardiology    CARDIAC CATHETERIZATION N/A 5/2/2022    Procedure: Cardiac pci;  Surgeon: Tray New MD;  Location: [facility] CARDIAC CATH LAB; Service: Cardiology    CHOLECYSTECTOMY      Percutaneous    COLONOSCOPY      CYSTOSCOPY      OTHER SURGICAL HISTORY      "stimulator to control bowel movements"    SD ESOPHAGOGASTRODUODENOSCOPY TRANSORAL DIAGNOSTIC N/A 9/27/2016    Procedure: ESOPHAGOGASTRODUODENOSCOPY (EGD); Surgeon: Yenny Laureano MD;  Location: AN GI LAB;   Service: Gastroenterology    SD LAP,CHOLECYSTECTOMY N/A 2/29/2016    Procedure: LAPAROSCOPIC CHOLECYSTECTOMY ;  Surgeon: Alisson Quiñones DO;  Location: [facility] Main OR;  Service: General    ROTATOR CUFF REPAIR Right     TOE AMPUTATION Right 10/28/2016    Procedure: 3RD TOE AMPUTATION ;  Surgeon: Marily Valenzuela DPM;  Location: [facility] Main OR;  Service:      Social History   Social History     Substance and Sexual Activity   Alcohol Use Not Currently     Social History     Substance and Sexual Activity   Drug Use No     Social History     Tobacco Use   Smoking Status Never Smoker   Smokeless Tobacco Never Used     Family History:   Family History   Problem Relation Age of Onset    Leukemia Mother     Liver disease Mother     Lung cancer Mother         heavy smoker - 3 ppd    Heart disease Father     Liver disease Father     Multiple myeloma Sister     Breast cancer Sister     Urolithiasis Family     Alcohol abuse Neg Hx     Depression Neg Hx     Drug abuse Neg Hx     Substance Abuse Neg Hx     Mental illness Neg Hx        Meds/Allergies   Current Outpatient Medications   Medication Sig Dispense Refill    aspirin (ECOTRIN LOW STRENGTH) 81 mg EC tablet Take 81 mg by mouth daily Resume on 8/14        atorvastatin (LIPITOR) 40 mg tablet TAKE 1 TABLET BY MOUTH DAILY WITH DINNER 90 tablet 1    BD Pen Needle Adina U/F 32G X 4 MM MISC USE TO INJECT INSULIN 4 TIMES DAILY 400 each 1    Blood Glucose Monitoring Suppl (True Metrix Meter) w/Device KIT Use to test blood sugars 3 times daily 1 kit 0    Blood Pressure Monitoring (BLOOD PRESSURE CUFF) MISC Use to check blood pressure before taking blood pressure medication and 1 hour after and follow instructions provided in discharge instructions based on the readings  1 each 0    calcium acetate (CALPHRON) 667 mg TAKE 2 TABLETS BY MOUTH THREE TIMES DAILY WITH MEALS      carvedilol (COREG) 12 5 mg tablet Take 1 tablet (12 5 mg total) by mouth 2 (two) times a day with meals 60 tablet 5    Cholecalciferol (Vitamin D3) 1 25 MG (87668 UT) CAPS TAKE 1 CAPSULE BY MOUTH ONE TIME PER WEEK 12 capsule 2    clopidogrel (PLAVIX) 75 mg tablet Take 1 tablet (75 mg total) by mouth in the morning  90 tablet 2    glucose blood (True Metrix Blood Glucose Test) test strip Use 1 each 3 (three) times a day Use as instructed 300 strip 1    insulin lispro (HumaLOG KwikPen) 100 units/mL injection pen INJECT 4 UNITS 3 TIMES A DAY WITH MEALS PLUS SCALE (max daily dose 42 units) 45 mL 1    isosorbide mononitrate (IMDUR) 30 mg 24 hr tablet Take 1 tablet (30 mg total) by mouth daily 30 tablet 0    Lancets Ultra Thin 30G MISC Use 3 times a day 300 each 0    Lantus 100 UNIT/ML subcutaneous injection INJECT 14 UNITS UNDER THE SKIN DAILY 10 mL 0    ONETOUCH DELICA LANCETS 48U MISC by Does not apply route 3 (three) times a day 270 each 1    sertraline (ZOLOFT) 25 mg tablet Take 1 tablet (25 mg total) by mouth daily at bedtime 90 tablet 1    TORSEMIDE PO Take 50 mg by mouth Pt takes 50 mg daily on non- dialysis days: sat, sun, Tues, and thrusday   Pt takes 10 mg of torsemide daily on dialysis days m- w- f        Continuous Blood Gluc  (DEXCOM G6 ) LANCE Use as directed for continuous glucose monitoring 1 Device 0    Continuous Blood Gluc Sensor (DEXCOM G6 SENSOR) MISC Use as directed for continuous glucose monitoring  Change every 10 days 1 each 11    Continuous Blood Gluc Transmit (DEXCOM G6 TRANSMITTER) MISC Use as directed for continuous glucose monitoring-Change every 3 months 1 each 3    Insulin Syringe-Needle U-100 (B-D INS SYRINGE 0 5CC/30GX1/2") 30G X 1/2" 0 5 ML MISC Inject once daily 100 each 1     No current facility-administered medications for this visit  No Known Allergies    Objective   Vitals: Blood pressure 124/68, pulse 78, temperature (!) 97 4 °F (36 3 °C), weight 106 kg (233 lb)  Physical Exam  Vitals reviewed  Constitutional:       General: He is not in acute distress  Appearance: He is well-developed  HENT:      Head: Normocephalic and atraumatic  Eyes:      Pupils: Pupils are equal, round, and reactive to light  Neck:      Thyroid: No thyromegaly  Cardiovascular:      Rate and Rhythm: Normal rate and regular rhythm  Heart sounds: Normal heart sounds  Pulmonary:      Effort: Pulmonary effort is normal  No respiratory distress  Breath sounds: Normal breath sounds  Musculoskeletal:         General: No deformity  Normal range of motion  Cervical back: Normal range of motion and neck supple  Skin:     General: Skin is warm and dry  Findings: No erythema  Neurological:      General: No focal deficit present  Mental Status: He is alert and oriented to person, place, and time  Psychiatric:         Mood and Affect: Mood normal          Behavior: Behavior normal          The history was obtained from the review of the chart, patient      Lab Results:   Lab Results   Component Value Date/Time    Hemoglobin A1C 5 5 06/20/2022 08:29 AM    Hemoglobin A1C 6 0 (H) 04/30/2022 09:56 AM    Hemoglobin A1C 6 0 (H) 04/19/2022 12:39 PM WBC 5 58 06/20/2022 08:29 AM    WBC 6 26 06/11/2022 05:23 AM    WBC 5 85 06/10/2022 11:03 AM    Hemoglobin 10 8 (L) 06/20/2022 08:29 AM    Hemoglobin 11 3 (L) 06/11/2022 05:23 AM    Hemoglobin 11 1 (L) 06/10/2022 11:03 AM    Hematocrit 36 0 (L) 06/20/2022 08:29 AM    Hematocrit 37 4 06/11/2022 05:23 AM    Hematocrit 35 5 (L) 06/10/2022 11:03 AM     (H) 06/20/2022 08:29 AM     (H) 06/11/2022 05:23 AM    MCV 98 06/10/2022 11:03 AM    Platelets 363 (L) 55/57/7509 08:29 AM    Platelets 881 (L) 39/12/3590 05:23 AM    Platelets 758 (L) 00/21/9007 11:03 AM    BUN 59 (H) 06/20/2022 08:29 AM    BUN 40 (H) 06/11/2022 05:23 AM    BUN 47 (H) 06/10/2022 11:03 AM    Potassium 4 8 06/20/2022 08:29 AM    Potassium 4 2 06/11/2022 05:23 AM    Potassium 4 9 06/10/2022 11:03 AM    Chloride 105 06/20/2022 08:29 AM    Chloride 97 06/11/2022 05:23 AM    Chloride 102 06/10/2022 11:03 AM    CO2 24 06/20/2022 08:29 AM    CO2 25 06/11/2022 05:23 AM    CO2 25 06/10/2022 11:03 AM    Creatinine 7 93 (H) 06/20/2022 08:29 AM    Creatinine 6 52 (H) 06/11/2022 05:23 AM    Creatinine 7 11 (H) 06/10/2022 11:03 AM    AST 16 06/20/2022 08:29 AM    AST 14 06/10/2022 11:03 AM    AST 26 06/03/2022 02:26 PM    ALT 25 06/20/2022 08:29 AM    ALT 13 06/10/2022 11:03 AM    ALT 14 06/03/2022 02:26 PM    Albumin 3 6 06/20/2022 08:29 AM    Albumin 4 0 06/10/2022 11:03 AM    Albumin 2 9 (L) 06/03/2022 02:26 PM    HDL, Direct 31 (L) 06/20/2022 08:29 AM    HDL, Direct 27 (L) 06/02/2022 07:23 AM    HDL, Direct 34 (L) 04/30/2022 04:53 AM    Triglycerides 182 (H) 06/20/2022 08:29 AM    Triglycerides 133 06/02/2022 07:23 AM    Triglycerides 125 04/30/2022 04:53 AM           Imaging Studies: I have personally reviewed pertinent reports  Portions of the record may have been created with voice recognition software  Occasional wrong word or "sound a like" substitutions may have occurred due to the inherent limitations of voice recognition software   Read the chart carefully and recognize, using context, where substitutions have occurred

## 2022-07-05 ENCOUNTER — CLINICAL SUPPORT (OUTPATIENT)
Dept: CARDIAC REHAB | Facility: CLINIC | Age: 62
End: 2022-07-05
Payer: MEDICARE

## 2022-07-05 DIAGNOSIS — Z98.61 POSTSURGICAL PERCUTANEOUS TRANSLUMINAL CORONARY ANGIOPLASTY STATUS: ICD-10-CM

## 2022-07-05 PROCEDURE — 93797 PHYS/QHP OP CAR RHAB WO ECG: CPT

## 2022-07-05 NOTE — PROGRESS NOTES
Cardiac Rehabilitation Plan of Care   Initial Care Plan          Today's date: 2022   # of Exercise Sessions Completed: Initial  Patient name: Anita Owens      : 1960  Age: 58 y o  MRN: [id number]  Referring Physician: Shanice Mireles MD  Cardiologist: Shanice Mireles MD  Provider: Darron Arenas  Clinician: He Roblero RN    Dx:   Encounter Diagnosis   Name Primary?  Postsurgical percutaneous transluminal coronary angioplasty status      Date of onset: 2022      SUMMARY OF PROGRESS:  Completed initial evaluation  Explained cardiac rehabilitation, cardiac risk factors, how the heart functions, heart healthy diet, RPE scale, diabetes and exercise  Completed sub max nu-step test  Obtained BMI and waist measurements  Telemetry monitor NSR with BBB at rest and with exercise  Exercise MET level 2 28  RPE 7  Patient very deconditioned, fatigues easily  Denied any complaint of chest discomfort  Provided him with handbook for cardiac rehabilitation and low sodium recipes  Pt stated goals included:  Increase endurance and strength to be less sedentary at home, stand from a chair with less difficulty, walk outdoors with spouse with less rest periods  He stated he is to have repeat angioplasty on 2022  He plans to start cardiac rehabilitation on 2PHQ9/GAD7 score was 6/6 MD made aware, provided relaxation tips and contact information on [facility] and behavioral health services  He has known history for depression and anxiety and continues to follow up with MD and takes medication as prescribed  His RYP score was 51, he plans to increase his consumption of fruits and vegetables  His initial weight was 229  He received dialysis 3 times a week  Will continue to monitor       Medication compliance: Yes   Comments: Pt reports to be compliant with medications  Fall Risk: Moderate   Comments: Denies a fall in the past 6 months and fatigue and sedentary lifestyle    EKG Interpretation: NSR with BBB      EXERCISE ASSESSMENT and PLAN    Current Exercise Program in Rehab:       Frequency: 3 days/week    Minutes: 3        METS: 2 28 Nu-step           HR: 76-79   RPE: 7         Modalities: Treadmill and NuStep      Exercise Progression 30 Day Goals :    Frequency: 3 days/week of cardiac rehab     Supplement with home exercise 2+ days/wk as tolerated    Minutes: 30-35                            >150 mins/wk of moderate intensity exercise   METS: 2-3 5   HR:     RPE: 4-6   Modalities: NuStep, Recumbent bike and chair to stands    Strength training: Will be added following 2-3 weeks of monitored exercise sessions   Modalities: Sit to Stands and leg curls, leg extentions    Home Exercise: Type: walking with spouse, Frequency: once days/week, frequent rest stops for 1 mile    Goals: Resume ADLs with increased strength, Attend Rehab regularly, Decrease sitting time and Start a walking program    Progression Toward Goals:  Reviewed Pt goals and determined plan of care, Patient will exercise 30 minutes in CR on nu-step increase time and resistance as tolerated in the next 30 days, Will continue to educate and progress as tolerated      Education: home exercise guidelines, AHA guidelines to achieve >150 mins/wk of moderate exercise and RPE scale   Plan:home exercise 30+ mins 2 days opposite CR  Readiness to change: Contemplation:  (Acknowledging that there is a problem but not yet ready or sure of wanting to make a change)      NUTRITION ASSESSMENT AND PLAN    Weight control:    Starting weight: 229   Current weight:     Waist circumference:    Startin   Current:      Diabetes: T2D, Patient reported fasting , on Insulin   A1c: 5 5  last measured: 2022    Lipid management: Discussed diet and lipid management and Last lipid profile 2022  Chol 92    HDL 31  LDL 25    Goals:Eat 4-5 cups of fruits and vegetables daily    Progression Toward Goals: Reviewed Pt goals and determined plan of care, Patient will increase consumption of fruits and vegetables in the next 30 days, Will continue to educate and progress as tolerated  Education: heart healthy eating  low sodium diet  hydration  nutrition for  lipid management  exercise and diabetes management   Plan: replace unhealthy snacks with fruits & vegs  Readiness to change: Pre-Contemplation:   (Not yet acknowledging that there is a problem behavior that needs to change)      PSYCHOSOCIAL ASSESSMENT AND PLAN    Emotional:  Depression assessment:  PHQ-9 = 5-9 = Mild Depression            Anxiety measure:  LEYLA-7 = 5-9 = Mild anxiety  Self-reported stress level:  3  Social support: Very Good and Patient reports excellent emotional/social support from family    Goals:  Reduce perceived stress to 1-3/10, improved [facility] QOL < 27, PHQ-9 - reduced severity by one level, continue medical therapy, follow up with therapist, Feelings in [place] Score < 3, Physical Fitness in [place] Score < 3, Daily Activity in [place] Score < 3, Social Activities in [place] Score < 3 and Overall Health in [place] Score < 3    Progression Toward Goals: Reviewed Pt goals and determined plan of care, Patient will continue to f/u with MD and take medication as prescribed, is considering seeing a therapist in addition to MD in the next 30 days, Will continue to educate and progress as tolerated      Education: signs/sxs of depression, benefits of a positive support system, stress management techniques, depression and CAD and benefits of mental health counseling  Plan: Refer to behavioral health/counseling, PHQ-9 >5 will refer to MD, Refer to [employer], Practice relaxation techniques, Exercise, Read a Book and Repeat PHQ-9 every 30 days if score >5  Readiness to change: Preparation:  (Getting ready to change)       OTHER CORE COMPONENTS     Tobacco:   Social History     Tobacco Use   Smoking Status Never Smoker   Smokeless Tobacco Never Used Tobacco Use Intervention:   N/A:  Patient is a non-smoker     Anginal Symptoms:  None   NTG use: No prescription    Blood pressure:    Restin/74   Exercise: 138/84    Goals: consistent BP < 130/80 and medication compliance    Progression Toward Goals: Reviewed Pt goals and determined plan of care, Will continue to educate and progress as tolerated      Education:  understanding high blood pressure and it's relationship to CAD and low sodium diet and HTN  Plan: Class: Understanding Heart Disease, Class: Common Heart Medications, Avoid Processed foods, engage in regular exercise and check labels for sodium content  Readiness to change: Preparation:  (Getting ready to change)

## 2022-07-05 NOTE — PROGRESS NOTES
CARDIAC REHAB ASSESSMENT    Today's date: 2022  Patient name: Milly Reid     : 1960       MRN: [id number]  PCP: Harpreet Kramer DO  Referring Physician: Gretta De Leon MD  Cardiologist: Gretta De Leon MD  Surgeon: N/A  Dx:   Encounter Diagnosis   Name Primary?     Postsurgical percutaneous transluminal coronary angioplasty status        Date of onset: 2022  Cultural needs: none    Weight    Wt Readings from Last 1 Encounters:   22 106 kg (233 lb)      Height:   Ht Readings from Last 1 Encounters:   06/15/22 5' 9" (1 753 m)     Medical History:   Past Medical History:   Diagnosis Date    Cerebrovascular accident (CVA) due to thrombosis of left middle cerebral artery ([facility] ) 2018    Chronic kidney disease     Diabetes mellitus ([facility] )     GERD (gastroesophageal reflux disease)     Hypercholesteremia     Hyperlipidemia     Hypertension     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    Neuropathy     Obesity     Osteomyelitis ([facility] )     last assessed 16    PVC's (premature ventricular contractions)     sees cardiology Dr Anupam Fonseca [facility]    Stroke [facility])     last weeof 2018 [facility] (transient ischemic attack) 10/28/2018         Physical Limitations: fatigue    Fall Risk: Moderate   Comments: Denies a fall in the past 6 months and deconditioning    Anginal Equivalent: None/denies angina   NTG use: No prescription    Risk Factors   Cholesterol: Yes  Smoking: Never used  HTN: Yes  DM: Type 2   average   insulin  Obesity: Yes   Inactivity: Yes  Stress:  perceived  stress: 3/10   Stressors:health   Goals for Stress Management:Practice Relaxation Techniques, Consult a Counselor and continue to f/u with MD in regards to hx of anxiety and depression, take medication as prescribed      Family History:  Family History   Problem Relation Age of Onset    Leukemia Mother     Liver disease Mother     Lung cancer Mother         heavy smoker - 3 ppd  Heart disease Father     Liver disease Father     Multiple myeloma Sister     Breast cancer Sister     Urolithiasis Family     Alcohol abuse Neg Hx     Depression Neg Hx     Drug abuse Neg Hx     Substance Abuse Neg Hx     Mental illness Neg Hx        Allergies: Patient has no known allergies  ETOH:   Social History     Substance and Sexual Activity   Alcohol Use Not Currently         Current Medications:   Current Outpatient Medications   Medication Sig Dispense Refill    aspirin (ECOTRIN LOW STRENGTH) 81 mg EC tablet Take 81 mg by mouth daily Resume on 8/14        atorvastatin (LIPITOR) 40 mg tablet TAKE 1 TABLET BY MOUTH DAILY WITH DINNER 90 tablet 1    BD Pen Needle Adina U/F 32G X 4 MM MISC USE TO INJECT INSULIN 4 TIMES DAILY 400 each 1    Blood Glucose Monitoring Suppl (True Metrix Meter) w/Device KIT Use to test blood sugars 3 times daily 1 kit 0    Blood Pressure Monitoring (BLOOD PRESSURE CUFF) MISC Use to check blood pressure before taking blood pressure medication and 1 hour after and follow instructions provided in discharge instructions based on the readings  1 each 0    calcium acetate (CALPHRON) 667 mg TAKE 2 TABLETS BY MOUTH THREE TIMES DAILY WITH MEALS      carvedilol (COREG) 12 5 mg tablet Take 1 tablet (12 5 mg total) by mouth 2 (two) times a day with meals 60 tablet 5    Cholecalciferol (Vitamin D3) 1 25 MG (78889 UT) CAPS TAKE 1 CAPSULE BY MOUTH ONE TIME PER WEEK 12 capsule 2    clopidogrel (PLAVIX) 75 mg tablet Take 1 tablet (75 mg total) by mouth in the morning  90 tablet 2    Continuous Blood Gluc  (DEXCOM G6 ) LANCE Use as directed for continuous glucose monitoring 1 Device 0    Continuous Blood Gluc Sensor (DEXCOM G6 SENSOR) MISC Use as directed for continuous glucose monitoring   Change every 10 days 1 each 11    Continuous Blood Gluc Transmit (DEXCOM G6 TRANSMITTER) MISC Use as directed for continuous glucose monitoring-Change every 3 months 1 each 3  glucose blood (True Metrix Blood Glucose Test) test strip Use 1 each 3 (three) times a day Use as instructed 300 strip 1    insulin lispro (HumaLOG KwikPen) 100 units/mL injection pen INJECT 4 UNITS 3 TIMES A DAY WITH MEALS PLUS SCALE (max daily dose 42 units) 45 mL 1    Insulin Syringe-Needle U-100 (B-D INS SYRINGE 0 5CC/30GX1/2") 30G X 1/2" 0 5 ML MISC Inject once daily 100 each 1    isosorbide mononitrate (IMDUR) 30 mg 24 hr tablet Take 1 tablet (30 mg total) by mouth daily 30 tablet 0    Lancets Ultra Thin 30G MISC Use 3 times a day 300 each 0    Lantus 100 UNIT/ML subcutaneous injection INJECT 14 UNITS UNDER THE SKIN DAILY 10 mL 0    ONETOUCH DELICA LANCETS 15U MISC by Does not apply route 3 (three) times a day 270 each 1    Semaglutide,0 25 or 0 5MG/DOS, (Ozempic, 0 25 or 0 5 MG/DOSE,) 2 MG/1 5ML SOPN Inject 0 25 mg once a week 3 mL 5    sertraline (ZOLOFT) 25 mg tablet Take 1 tablet (25 mg total) by mouth daily at bedtime 90 tablet 1    TORSEMIDE PO Take 50 mg by mouth Pt takes 50 mg daily on non- dialysis days: sat, sun, Tues, and thrusday  Pt takes 10 mg of torsemide daily on dialysis days m- w- f  No current facility-administered medications for this visit  Functional Status Prior to Diagnosis for Treatment   Occupation: retired  Recreation: sedentary lifestyle, watches TV, reads, sleeps  ADLs: able to perform self-care  Wheeler: able to perform self-care  Exercise: none  Other: Previous CVA, on dialysis     Current Functional Status  Occupation: retired  Recreation: sedentary, watches TV, reads  ADLs:able to perform self-care  Wheeler: able to perform self-care  Exercise: walks 1 mile with spouse once a week with many rest periods      Patient Specific Goals:   Increase strength and endurance to increase mobility at home to assist with chores (wash dishes) and be able to stand from a chair with less difficulty, and attend cardiac rehabilitation reguarly    Short Term Program Goals: increased strength    Long Term Goals: increased maximal walking duration  Improved [facility] Activity Status score  Improved Quality of Life - [facility] score reduced  Increase endurance to exercise more than 2 28 MET's    Ability to reach goals/rehabilitation potential:  Good    Projected return to function: 8-12 weeks  Objective tests: sub-max NuStep ETT      Nutritional   Reviewed details of Rate your Plate  Discussed key elements of heart healthy eating  Reviewed patient goals for dietary modifications and their clinical implications  Reviewed most recent lipid profile       Goals for dietary modification: increase fruits and vegetables      Emotional/Social  Patient has a history of depression  Patient has a history of anxiety   Patient reports feelings of depression   Patient reports feelings of anxiety  Reports sufficient emotional support  compliant with medical therapy for depression/anxiety  Provided contact information for [facility]    Marital status:     Domestic Violence Screening: feels safe and free of harm    Comments: initial evaluation completed

## 2022-07-06 ENCOUNTER — TELEPHONE (OUTPATIENT)
Dept: ENDOCRINOLOGY | Facility: CLINIC | Age: 62
End: 2022-07-06

## 2022-07-07 NOTE — TELEPHONE ENCOUNTER
Pt didn't bring entire form  Called and left vm stating he has to bring the rest of the form in so it can be filled out

## 2022-07-08 ENCOUNTER — APPOINTMENT (OUTPATIENT)
Dept: CARDIAC REHAB | Facility: CLINIC | Age: 62
End: 2022-07-08
Payer: MEDICARE

## 2022-07-12 DIAGNOSIS — I25.10 CAD (CORONARY ARTERY DISEASE): ICD-10-CM

## 2022-07-14 ENCOUNTER — TELEPHONE (OUTPATIENT)
Dept: ENDOCRINOLOGY | Facility: CLINIC | Age: 62
End: 2022-07-14

## 2022-07-14 ENCOUNTER — HOSPITAL ENCOUNTER (EMERGENCY)
Facility: HOSPITAL | Age: 62
Discharge: HOME | End: 2022-07-14
Attending: EMERGENCY MEDICINE
Payer: MEDICARE

## 2022-07-14 ENCOUNTER — APPOINTMENT (EMERGENCY)
Dept: RADIOLOGY | Facility: HOSPITAL | Age: 62
End: 2022-07-14
Payer: MEDICARE

## 2022-07-14 VITALS
DIASTOLIC BLOOD PRESSURE: 77 MMHG | WEIGHT: 235 LBS | SYSTOLIC BLOOD PRESSURE: 185 MMHG | HEART RATE: 72 BPM | RESPIRATION RATE: 18 BRPM | HEIGHT: 69 IN | TEMPERATURE: 98.1 F | BODY MASS INDEX: 34.8 KG/M2 | OXYGEN SATURATION: 94 %

## 2022-07-14 DIAGNOSIS — N18.6 ESRD (END STAGE RENAL DISEASE) (HCC): ICD-10-CM

## 2022-07-14 DIAGNOSIS — I10 HYPERTENSION, UNSPECIFIED TYPE: ICD-10-CM

## 2022-07-14 DIAGNOSIS — Z99.2 ESRD ON DIALYSIS (HCC): ICD-10-CM

## 2022-07-14 DIAGNOSIS — R77.8 ELEVATED TROPONIN: Primary | ICD-10-CM

## 2022-07-14 DIAGNOSIS — R07.89 OTHER CHEST PAIN: ICD-10-CM

## 2022-07-14 DIAGNOSIS — N18.6 ESRD ON DIALYSIS (HCC): ICD-10-CM

## 2022-07-14 DIAGNOSIS — R19.7 DIARRHEA: ICD-10-CM

## 2022-07-14 DIAGNOSIS — R11.2 NAUSEA & VOMITING: ICD-10-CM

## 2022-07-14 PROBLEM — I25.10 CORONARY ARTERY DISEASE INVOLVING NATIVE CORONARY ARTERY OF NATIVE HEART WITHOUT ANGINA PECTORIS: Status: ACTIVE | Noted: 2022-07-14

## 2022-07-14 LAB
2HR DELTA HS TROPONIN: 6 NG/L
4HR DELTA HS TROPONIN: 5 NG/L
ALBUMIN SERPL BCP-MCNC: 4.2 G/DL (ref 3.5–5)
ALP SERPL-CCNC: 77 U/L (ref 34–104)
ALT SERPL W P-5'-P-CCNC: 11 U/L (ref 7–52)
ANION GAP SERPL CALCULATED.3IONS-SCNC: 13 MMOL/L (ref 4–13)
AST SERPL W P-5'-P-CCNC: 10 U/L (ref 13–39)
ATRIAL RATE: 75 BPM
ATRIAL RATE: 78 BPM
BASOPHILS # BLD AUTO: 0.04 THOUSANDS/ΜL (ref 0–0.1)
BASOPHILS NFR BLD AUTO: 1 % (ref 0–1)
BILIRUB SERPL-MCNC: 0.53 MG/DL (ref 0.2–1)
BNP SERPL-MCNC: 236 PG/ML (ref 0–100)
BUN SERPL-MCNC: 58 MG/DL (ref 5–25)
CALCIUM SERPL-MCNC: 9.8 MG/DL (ref 8.4–10.2)
CARDIAC TROPONIN I PNL SERPL HS: 33 NG/L
CARDIAC TROPONIN I PNL SERPL HS: 38 NG/L
CARDIAC TROPONIN I PNL SERPL HS: 39 NG/L
CHLORIDE SERPL-SCNC: 100 MMOL/L (ref 96–108)
CO2 SERPL-SCNC: 22 MMOL/L (ref 21–32)
CREAT SERPL-MCNC: 8.34 MG/DL (ref 0.6–1.3)
EOSINOPHIL # BLD AUTO: 0.17 THOUSAND/ΜL (ref 0–0.61)
EOSINOPHIL NFR BLD AUTO: 2 % (ref 0–6)
ERYTHROCYTE [DISTWIDTH] IN BLOOD BY AUTOMATED COUNT: 14.1 % (ref 11.6–15.1)
GFR SERPL CREATININE-BSD FRML MDRD: 6 ML/MIN/1.73SQ M
GLUCOSE SERPL-MCNC: 121 MG/DL (ref 65–140)
HCT VFR BLD AUTO: 37.9 % (ref 36.5–49.3)
HGB BLD-MCNC: 11.8 G/DL (ref 12–17)
IMM GRANULOCYTES # BLD AUTO: 0.08 THOUSAND/UL (ref 0–0.2)
IMM GRANULOCYTES NFR BLD AUTO: 1 % (ref 0–2)
LYMPHOCYTES # BLD AUTO: 1.21 THOUSANDS/ΜL (ref 0.6–4.47)
LYMPHOCYTES NFR BLD AUTO: 16 % (ref 14–44)
MAGNESIUM SERPL-MCNC: 2.2 MG/DL (ref 1.9–2.7)
MCH RBC QN AUTO: 29.6 PG (ref 26.8–34.3)
MCHC RBC AUTO-ENTMCNC: 31.1 G/DL (ref 31.4–37.4)
MCV RBC AUTO: 95 FL (ref 82–98)
MONOCYTES # BLD AUTO: 0.7 THOUSAND/ΜL (ref 0.17–1.22)
MONOCYTES NFR BLD AUTO: 9 % (ref 4–12)
NEUTROPHILS # BLD AUTO: 5.43 THOUSANDS/ΜL (ref 1.85–7.62)
NEUTS SEG NFR BLD AUTO: 71 % (ref 43–75)
NRBC BLD AUTO-RTO: 0 /100 WBCS
P AXIS: 45 DEGREES
P AXIS: 55 DEGREES
PLATELET # BLD AUTO: 143 THOUSANDS/UL (ref 149–390)
PMV BLD AUTO: 10.4 FL (ref 8.9–12.7)
POTASSIUM SERPL-SCNC: 5.2 MMOL/L (ref 3.5–5.3)
PR INTERVAL: 178 MS
PR INTERVAL: 180 MS
PROT SERPL-MCNC: 7.2 G/DL (ref 6.4–8.4)
QRS AXIS: -17 DEGREES
QRS AXIS: 1 DEGREES
QRSD INTERVAL: 154 MS
QRSD INTERVAL: 154 MS
QT INTERVAL: 410 MS
QT INTERVAL: 410 MS
QTC INTERVAL: 457 MS
QTC INTERVAL: 467 MS
RBC # BLD AUTO: 3.98 MILLION/UL (ref 3.88–5.62)
SODIUM SERPL-SCNC: 135 MMOL/L (ref 135–147)
T WAVE AXIS: 8 DEGREES
T WAVE AXIS: 9 DEGREES
VENTRICULAR RATE: 75 BPM
VENTRICULAR RATE: 78 BPM
WBC # BLD AUTO: 7.63 THOUSAND/UL (ref 4.31–10.16)

## 2022-07-14 PROCEDURE — 80053 COMPREHEN METABOLIC PANEL: CPT

## 2022-07-14 PROCEDURE — 83880 ASSAY OF NATRIURETIC PEPTIDE: CPT

## 2022-07-14 PROCEDURE — 83735 ASSAY OF MAGNESIUM: CPT

## 2022-07-14 PROCEDURE — 36415 COLL VENOUS BLD VENIPUNCTURE: CPT

## 2022-07-14 PROCEDURE — 93010 ELECTROCARDIOGRAM REPORT: CPT | Performed by: INTERNAL MEDICINE

## 2022-07-14 PROCEDURE — 99284 EMERGENCY DEPT VISIT MOD MDM: CPT | Performed by: PHYSICIAN ASSISTANT

## 2022-07-14 PROCEDURE — 99285 EMERGENCY DEPT VISIT HI MDM: CPT

## 2022-07-14 PROCEDURE — 84484 ASSAY OF TROPONIN QUANT: CPT

## 2022-07-14 PROCEDURE — 85025 COMPLETE CBC W/AUTO DIFF WBC: CPT

## 2022-07-14 PROCEDURE — 93005 ELECTROCARDIOGRAM TRACING: CPT

## 2022-07-14 PROCEDURE — 96375 TX/PRO/DX INJ NEW DRUG ADDON: CPT

## 2022-07-14 PROCEDURE — 96374 THER/PROPH/DIAG INJ IV PUSH: CPT

## 2022-07-14 PROCEDURE — 71046 X-RAY EXAM CHEST 2 VIEWS: CPT

## 2022-07-14 PROCEDURE — 99214 OFFICE O/P EST MOD 30 MIN: CPT | Performed by: INTERNAL MEDICINE

## 2022-07-14 RX ORDER — LABETALOL HYDROCHLORIDE 5 MG/ML
10 INJECTION, SOLUTION INTRAVENOUS ONCE
Status: COMPLETED | OUTPATIENT
Start: 2022-07-14 | End: 2022-07-14

## 2022-07-14 RX ORDER — ONDANSETRON 2 MG/ML
4 INJECTION INTRAMUSCULAR; INTRAVENOUS ONCE
Status: COMPLETED | OUTPATIENT
Start: 2022-07-14 | End: 2022-07-14

## 2022-07-14 RX ADMIN — ONDANSETRON 4 MG: 2 INJECTION INTRAMUSCULAR; INTRAVENOUS at 03:39

## 2022-07-14 RX ADMIN — LABETALOL HYDROCHLORIDE 10 MG: 5 INJECTION, SOLUTION INTRAVENOUS at 07:58

## 2022-07-14 NOTE — ASSESSMENT & PLAN NOTE
Lab Results   Component Value Date    EGFR 6 07/14/2022    EGFR 6 06/20/2022    EGFR 8 06/11/2022    CREATININE 8 34 (H) 07/14/2022    CREATININE 7 93 (H) 06/20/2022    CREATININE 6 52 (H) 06/11/2022   · Per discussion with patient he is due for dialysis tomorrow  · ER staff reporting patient was supposed to have dialysis today  · Will ask Nephrology to elucidate plans regarding dialysis for possible discharge home with dialysis as an outpatient  Either this afternoon or tomorrow    Versus dialysis while in the hospital

## 2022-07-14 NOTE — ASSESSMENT & PLAN NOTE
· Patient presented with 2 episodes of nausea vomiting last night after getting up to go the bathroom  · There was reports of chest pain in the ER however ostomy he is adamantly denying ever having any chest pain  No shortness of breath no palpitations  · Patient's nausea has resolved and he is currently hungry  · Delta troponin remains negative  · Repeat troponin now if troponin remains stable with no change in delta I would recommend discharge home    · Will also allow patient to eat now

## 2022-07-14 NOTE — ASSESSMENT & PLAN NOTE
· Patient with known coronary disease  · Patient undergoing staged high risk interventions at [facility] in [place]  · Patient is status post 1 procedure a few weeks ago and is scheduled on July 28 for repeat procedure  · Again patient denied chest pain or cardiac symptoms other than nausea at time of my evaluation  This nausea has resolved    · Patient with known coronary disease and is high risk for intervention  · Repeat troponin now if if this remains stable plan to discharge home with close outpatient follow-up

## 2022-07-14 NOTE — TELEPHONE ENCOUNTER
Please inform pt to stop Ozempic and continue the rest of management, send log in 2 weeks     Ewa Kruse MD

## 2022-07-14 NOTE — CONSULTS
[address] 1960, 58 y o  male MRN: [id number]  Unit/Bed#: ED 16 Encounter: [id number]  Primary Care Provider: Mindy Calvo DO   Date and time admitted to hospital: 7/14/2022  3:17 AM    Consults    * Nausea & vomiting  Assessment & Plan  · Patient presented with 2 episodes of nausea vomiting last night after getting up to go the bathroom  · There was reports of chest pain in the ER however ostomy he is adamantly denying ever having any chest pain  No shortness of breath no palpitations  · Patient's nausea has resolved and he is currently hungry  · Delta troponin remains negative  · Repeat troponin now if troponin remains stable with no change in delta I would recommend discharge home  · Will also allow patient to eat now    Coronary artery disease involving native coronary artery of native heart without angina pectoris  Assessment & Plan  · Patient with known coronary disease  · Patient undergoing staged high [facility] interventions at [facility]  · Patient is status post 1 procedure a few weeks ago and is scheduled on July 28 for repeat procedure  · Again patient denied chest pain or cardiac symptoms other than nausea at time of my evaluation  This nausea has resolved  · Patient with known coronary disease and is high risk for intervention  · Repeat troponin now if if this remains stable plan to discharge home with close outpatient follow-up    ESRD on dialysis [facility])  Assessment & Plan  Lab Results   Component Value Date    EGFR 6 07/14/2022    EGFR 6 06/20/2022    EGFR 8 06/11/2022    CREATININE 8 34 (H) 07/14/2022    CREATININE 7 93 (H) 06/20/2022    CREATININE 6 52 (H) 06/11/2022   · Per discussion with patient he is due for dialysis tomorrow  · ER staff reporting patient was supposed to have dialysis today  · Will ask Nephrology to elucidate plans regarding dialysis for possible discharge home with dialysis as an outpatient  Either this afternoon or tomorrow  Versus dialysis while in the hospital        Recommendations for Discharge:  · Discharge home if 3rd troponin remained stable with no significant delta troponin    Counseling / Coordination of Care Time: 30 minutes Greater than 50% of total time spent on patient counseling and coordination of care  Collaboration of Care: Were Recommendations Directly Discussed with Primary Treatment Team? Yes    History of Present Illness:  Rolando Bowers is a 58 y o  male who is originally admitted to the emergency room service due to nausea vomiting  We are consulted for need for dialysis and coronary disease  Patient has known high risk coronary disease for which he is undergoing staged high risk catheterizations at [facility] in [place]  Patient is status post 1 procedure reports he is scheduled on July 28th for another procedure echo in the [place]  Patient reports he woke up to go to the bathroom felt nauseous and had to 3 episodes of vomiting at home  His wife brought him to the hospital for evaluation  At time my evaluation patient denies ever having any chest pain  He reports he feels 100% relieved like to eat go home  He also reports he is not due for dialysis until tomorrow although the ER reports he is due for dialysis today  Plan is as above and to repeat troponin now to assure stability  But patient needs today and if stable will discharge home with plan as above    Review of Systems:  Review of Systems   Constitutional: Negative for fatigue and fever  Respiratory: Negative for cough, choking, shortness of breath, wheezing and stridor  Cardiovascular: Negative for chest pain, palpitations and leg swelling  Gastrointestinal: Positive for nausea and vomiting  Negative for abdominal pain, blood in stool, constipation and diarrhea  Neurological: Negative for tremors, syncope and weakness     Psychiatric/Behavioral: Negative for agitation and confusion  Past Medical and Surgical History:   Past Medical History:   Diagnosis Date    Cerebrovascular accident (CVA) due to thrombosis of left middle cerebral artery ([facility] ) 7/29/2018    Chronic kidney disease     [facility]     GERD (gastroesophageal reflux disease)     Hypercholesteremia     Hyperlipidemia     Hypertension     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    Neuropathy     Obesity     Osteomyelitis ([facility] )     last assessed 11/4/16    PVC's (premature ventricular contractions)     sees cardiology Dr Meliza camargo    Stroke [facility])     last weeof July 2018 [address] [facility]    TIA (transient ischemic attack) 10/28/2018       Past Surgical History:   Procedure Laterality Date    ABDOMINAL SURGERY      CARDIAC CATHETERIZATION N/A 5/2/2022    Procedure: Cardiac Coronary Angiogram;  Surgeon: Familia Avila MD;  [facility] CARDIAC [facility]ATH [facility]; Service: Cardiology    CARDIAC CATHETERIZATION N/A 5/2/2022    Procedure: Cardiac pci;  Surgeon: Familia Avila MD;  [facility]: [facility] CARDIAC CATH LAB; Service: Cardiology    CHOLECYSTECTOMY      Percutaneous    COLONOSCOPY      CYSTOSCOPY      OTHER SURGICAL HISTORY      "stimulator to control bowel movements"    ND ESOPHAGOGASTRODUODENOSCOPY TRANSORAL DIAGNOSTIC N/A 9/27/2016    Procedure: ESOPHAGOGASTRODUODENOSCOPY (EGD); Surgeon: Guillermina Velázquez MD;  Location: [facility] GI LAB; Service: Gastroenterology    ND LAP,CHOLECYSTECTOMY N/A 2/29/2016    Procedure: LAPAROSCOPIC CHOLECYSTECTOMY ;  Surgeon: Issac Wilson DO;  Location: [facility] Main OR;  Service: General    ROTATOR CUFF REPAIR Right     TOE AMPUTATION Right 10/28/2016    Procedure: 3RD TOE AMPUTATION ;  Surgeon: Santos Fonseca DPM;  Location: [facility] Main OR;  Service:        Meds/Allergies:  PTA meds:   Prior to Admission Medications   Prescriptions Last Dose Informant Patient Reported? Taking?    BD Pen Needle Adina U/F 32G X 4 MM MISC  Spouse/Significant Other No Yes Sig: USE TO INJECT INSULIN 4 TIMES DAILY   Blood Glucose Monitoring Suppl (True Metrix Meter) w/Device KIT   No Yes   Sig: Use to test blood sugars 3 times daily   Blood Pressure Monitoring (BLOOD PRESSURE CUFF) MISC  Spouse/Significant Other No Yes   Sig: Use to check blood pressure before taking blood pressure medication and 1 hour after and follow instructions provided in discharge instructions based on the readings  Cholecalciferol (Vitamin D3) 1 25 MG (14037 UT) CAPS   No Yes   Sig: TAKE 1 CAPSULE BY MOUTH ONE TIME PER WEEK   Cinacalcet HCl (SENSIPAR PO)   Yes Yes   Sig: Take 60 mg by mouth 3 (three) times a week   Insulin Syringe-Needle U-100 (B-D INS SYRINGE 0 5CC/30GX1/2") 30G X 1/2" 0 5 ML MISC   No Yes   Sig: Inject once daily   Lancets Ultra Thin 30G MISC  Spouse/Significant Other No Yes   Sig: Use 3 times a day   Lantus 100 UNIT/ML subcutaneous injection  Spouse/Significant Other No Yes   Sig: INJECT 14 UNITS UNDER THE SKIN DAILY   ONETOUCH DELICA LANCETS 67N MISC  Spouse/Significant Other No Yes   Sig: by Does not apply route 3 (three) times a day   Semaglutide,0 25 or 0 5MG/DOS, (Ozempic, 0 25 or 0 5 MG/DOSE,) 2 MG/1 5ML SOPN   No Yes   Sig: Inject 0 25 mg once a week   TORSEMIDE PO  Spouse/Significant Other Yes Yes   Sig: Take 50 mg by mouth Pt takes 50 mg daily on non- dialysis days: sat, sun, Tues, and thrusday   Pt takes 10 mg of torsemide daily on dialysis days m- w- f     aspirin (ECOTRIN LOW STRENGTH) 81 mg EC tablet  Spouse/Significant Other Yes Yes   Sig: Take 81 mg by mouth daily Resume on 8/14     atorvastatin (LIPITOR) 40 mg tablet   No Yes   Sig: TAKE 1 TABLET BY MOUTH DAILY WITH DINNER   calcium acetate (CALPHRON) 667 mg  Spouse/Significant Other Yes Yes   Sig: TAKE 2 TABLETS BY MOUTH THREE TIMES DAILY WITH MEALS   carvedilol (COREG) 12 5 mg tablet   No Yes   Sig: Take 1 tablet (12 5 mg total) by mouth 2 (two) times a day with meals   clopidogrel (PLAVIX) 75 mg tablet   No Yes   Sig: Take 1 tablet (75 mg total) by mouth in the morning  glucose blood (True Metrix Blood Glucose Test) test strip   No Yes   Sig: Use 1 each 3 (three) times a day Use as instructed   insulin lispro (HumaLOG KwikPen) 100 units/mL injection pen  Spouse/Significant Other No Yes   Sig: INJECT 4 UNITS 3 TIMES A DAY WITH MEALS PLUS SCALE (max daily dose 42 units)   isosorbide mononitrate (IMDUR) 30 mg 24 hr tablet   No Yes   Sig: Take 1 tablet (30 mg total) by mouth daily   sertraline (ZOLOFT) 25 mg tablet   No Yes   Sig: Take 1 tablet (25 mg total) by mouth daily at bedtime      Facility-Administered Medications: None       Allergies: Allergies   Allergen Reactions    Shrimp Extract Allergy Skin Test - Food Allergy Rash     Other reaction(s): Unknown       Social History:  Marital Status: /Civil Union  Substance Use History:   Social History     Substance and Sexual Activity   Alcohol Use Not Currently     Social History     Tobacco Use   Smoking Status Never Smoker   Smokeless Tobacco Never Used     Social History     Substance and Sexual Activity   Drug Use No       Family History:  Family History   Problem Relation Age of Onset    Leukemia Mother     Liver disease Mother     Lung cancer Mother         heavy smoker - 3 ppd    Heart disease Father     Liver disease Father     Multiple myeloma Sister     Breast cancer Sister     Urolithiasis Family     Alcohol abuse Neg Hx     Depression Neg Hx     Drug abuse Neg Hx     Substance Abuse Neg Hx     Mental illness Neg Hx        Physical Exam:   Vitals:   Blood Pressure: (!) 183/81 (07/14/22 0800)  Pulse: 74 (07/14/22 0800)  Temperature: 98 1 °F (36 7 °C) (07/14/22 0320)  Temp Source: Oral (07/14/22 0320)  Respirations: 18 (07/14/22 0743)  Height: 5' 9" (175 3 cm) (07/14/22 0320)  Weight - Scale: 107 kg (235 lb) (07/14/22 0320)  SpO2: 94 % (07/14/22 0800)    Physical Exam  Constitutional:       General: He is not in acute distress       Appearance: He is not diaphoretic  Eyes:      General: No scleral icterus  Cardiovascular:      Rate and Rhythm: Normal rate and regular rhythm  Pulses: Normal pulses  Heart sounds: Normal heart sounds  No murmur heard  No friction rub  No gallop  Pulmonary:      Effort: Pulmonary effort is normal  No respiratory distress  Breath sounds: Normal breath sounds  No stridor  No wheezing, rhonchi or rales  Abdominal:      General: Abdomen is flat  There is no distension  Palpations: Abdomen is soft  There is no mass  Tenderness: There is no abdominal tenderness  There is no guarding  Neurological:      General: No focal deficit present  Mental Status: He is oriented to person, place, and time            Additional Data:   Lab Results:    Results from last 7 days   Lab Units 07/14/22  0339   WBC Thousand/uL 7 63   HEMOGLOBIN g/dL 11 8*   HEMATOCRIT % 37 9   PLATELETS Thousands/uL 143*   NEUTROS PCT % 71   LYMPHS PCT % 16   MONOS PCT % 9   EOS PCT % 2     Results from last 7 days   Lab Units 07/14/22  0339   SODIUM mmol/L 135   POTASSIUM mmol/L 5 2   CHLORIDE mmol/L 100   CO2 mmol/L 22   BUN mg/dL 58*   CREATININE mg/dL 8 34*   ANION GAP mmol/L 13   CALCIUM mg/dL 9 8   ALBUMIN g/dL 4 2   TOTAL BILIRUBIN mg/dL 0 53   ALK PHOS U/L 77   ALT U/L 11   AST U/L 10*   GLUCOSE RANDOM mg/dL 121             Lab Results   Component Value Date/Time    HGBA1C 5 5 06/20/2022 08:29 AM    HGBA1C 6 0 (H) 04/30/2022 09:56 AM    HGBA1C 6 0 (H) 04/19/2022 12:39 PM    HGBA1C 5 8 (H) 06/08/2020 08:17 AM    HGBA1C 6 9 (H) 02/24/2020 12:30 PM    HGBA1C 8 9 (H) 01/16/2017 10:24 AM    HGBA1C 8 9 (H) 08/10/2016 08:50 AM    HGBA1C 7 2 (H) 12/29/2015 05:54 AM               Imaging: Personally reviewed the following imaging: chest xray  XR chest 2 views   ED Interpretation by Cornelio Galarza PA-C (07/14 3908)   Mild pulmonary congestion, otherwise no evidence of infiltrate, pneumothorax, or other acute cardiopulmonary disease as interpreted by me          EKG, Pathology, and Other Studies Reviewed on Admission:   · EKG:  Normal sinus rhythm right bundle dee block  Consistent with prior    ** Please Note: This note may have been constructed using a voice recognition system   **

## 2022-07-14 NOTE — ED PROVIDER NOTES
History  Chief Complaint   Patient presents with    Chest Pain     Hx dialysis MWF (last done Monday) - dates were recently changed from [address]; Pt started vomiting/diarrhea/burning midsternal chest pain 6/10 today; denies dizziness/SOB/any other complaints at this time     The patient is a 30-year-old male with PMH of CAD, end-stage renal disease, and diabetes mellitus that presents to the emergency department for evaluation of chest pain, nausea, vomiting and diarrhea  The patient reports that he has had a sharp chest pain in the center of his chest intermittently x 3 days  Tonight, he developed nausea, vomiting, and diarrhea  Denies sick contacts, hematemesis, melena, hematochezia  He reports that, when he was on the toilet, he slid forward off the toilet and struck his upper back on the back of the toilet  Denies head strike, neck pain, LOC, numbness/paresthesia  He does report some abdominal discomfort with episodes of nausea/vomiting  The patient was seen in this facility on 5/2/22 with MI at which time stents were placed, but was told that there was an additional vessel that could not be stented  They went to [facility] where additional stents were placed and plans were made for RCA stent in the future on June 23rd  His last dialysis was 2 days ago; he was on M/W/F but was reportedly just switched to T/TH/S, which is why he did not have dialysis yesterday  The patient otherwise denies fevers, dyspnea, cough, congestion, chills,  or loss of consciousness  The patient takes ASA and Eliquis which he has been compliant with  Prior to Admission Medications   Prescriptions Last Dose Informant Patient Reported? Taking?    BD Pen Needle Adina U/F 32G X 4 MM MISC  Spouse/Significant Other No Yes   Sig: USE TO INJECT INSULIN 4 TIMES DAILY   Blood Glucose Monitoring Suppl (True Metrix Meter) w/Device KIT   No Yes   Sig: Use to test blood sugars 3 times daily   Blood Pressure Monitoring (BLOOD PRESSURE CUFF) MISC  Spouse/Significant Other No Yes   Sig: Use to check blood pressure before taking blood pressure medication and 1 hour after and follow instructions provided in discharge instructions based on the readings  Cholecalciferol (Vitamin D3) 1 25 MG (98873 UT) CAPS   No Yes   Sig: TAKE 1 CAPSULE BY MOUTH ONE TIME PER WEEK   Cinacalcet HCl (SENSIPAR PO)   Yes Yes   Sig: Take 60 mg by mouth 3 (three) times a week   Insulin Syringe-Needle U-100 (B-D INS SYRINGE 0 5CC/30GX1/2") 30G X 1/2" 0 5 ML MISC   No Yes   Sig: Inject once daily   Lancets Ultra Thin 30G MISC  Spouse/Significant Other No Yes   Sig: Use 3 times a day   Lantus 100 UNIT/ML subcutaneous injection  Spouse/Significant Other No Yes   Sig: INJECT 14 UNITS UNDER THE SKIN DAILY   ONETOUCH DELICA LANCETS 52A MISC  Spouse/Significant Other No Yes   Sig: by Does not apply route 3 (three) times a day   Semaglutide,0 25 or 0 5MG/DOS, (Ozempic, 0 25 or 0 5 MG/DOSE,) 2 MG/1 5ML SOPN   No Yes   Sig: Inject 0 25 mg once a week   TORSEMIDE PO  Spouse/Significant Other Yes Yes   Sig: Take 50 mg by mouth Pt takes 50 mg daily on non- dialysis days: sat, sun, Tues, and thrusday  Pt takes 10 mg of torsemide daily on dialysis days m- w- f     aspirin (ECOTRIN LOW STRENGTH) 81 mg EC tablet  Spouse/Significant Other Yes Yes   Sig: Take 81 mg by mouth daily Resume on 8/14     atorvastatin (LIPITOR) 40 mg tablet   No Yes   Sig: TAKE 1 TABLET BY MOUTH DAILY WITH DINNER   calcium acetate (CALPHRON) 667 mg  Spouse/Significant Other Yes Yes   Sig: TAKE 2 TABLETS BY MOUTH THREE TIMES DAILY WITH MEALS   carvedilol (COREG) 12 5 mg tablet   No Yes   Sig: Take 1 tablet (12 5 mg total) by mouth 2 (two) times a day with meals   clopidogrel (PLAVIX) 75 mg tablet   No Yes   Sig: Take 1 tablet (75 mg total) by mouth in the morning     glucose blood (True Metrix Blood Glucose Test) test strip   No Yes   Sig: Use 1 each 3 (three) times a day Use as instructed insulin lispro (HumaLOG KwikPen) 100 units/mL injection pen  Spouse/Significant Other No Yes   Sig: INJECT 4 UNITS 3 TIMES A DAY WITH MEALS PLUS SCALE (max daily dose 42 units)   isosorbide mononitrate (IMDUR) 30 mg 24 hr tablet   No Yes   Sig: Take 1 tablet (30 mg total) by mouth daily   sertraline (ZOLOFT) 25 mg tablet   No Yes   Sig: Take 1 tablet (25 mg total) by mouth daily at bedtime      Facility-Administered Medications: None       Past Medical History:   Diagnosis Date    Cere[facility] 7/29/2018    Chronic kidney disease     Diabetes mellitus ([facility] )     GERD (gastroesophageal reflux disease)     Hypercholesteremia     Hyperlipidemia     Hypertension     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    Neuropathy     Obesity     Osteomyelitis ([facility] )     last assessed 11/4/16    PVC's (premature ventricular contractions)     sees cardiology Dr Denise camargo    Stroke [facility])     last weeof July 2018 [address]    TIA (transient ischemic attack) 10/28/2018       Past Surgical History:   Procedure Laterality Date    ABDOMINAL SURGERY      CARDIAC CATHETERIZATION N/A 5/2/2022    Procedure: Cardiac Coronary Angiogram;  Surgeon: Max Bermeo MD;  [facility] CARD[facility]; Service: Cardiology    CARDIAC CATHETERIZATION N/A 5/2/2022    Procedure: Cardiac pci;  Surgeon: Max Bermeo MD;  [facility] CARD[facility]; Service: Cardiology    CHOLECYSTECTOMY      Percutaneous    COLONOSCOPY      CYSTOSCOPY      OTHER SURGICAL HISTORY      "stimulator to control bowel movements"    AL ESOPHAGOGASTRODUODENOSCOPY TRANSORAL DIAGNOSTIC N/A 9/27/2016    Procedure: ESOPHAGOGASTRODUODENOSCOPY (EGD); Surgeon: Evelina Sanz MD;  Location: [facility] GI LAB;   Service: Gastroenterology    AL LAP,CHOLECYSTECTOMY N/A 2/29/2016    Procedure: LAPAROSCOPIC CHOLECYSTECTOMY ;  Surgeon: Nuha Toro DO;  Location: [facility] Main OR; Service: General    ROTATOR CUFF REPAIR Right     TOE AMPUTATION Right 10/28/2016    Procedure: 3RD TOE AMPUTATION ;  Surgeon: Nicolle Judge DPM;  Location: [facility] Main OR;  Service:        Family History   Problem Relation Age of Onset    Leukemia Mother     Liver disease Mother     Lung cancer Mother         heavy smoker - 3 ppd    Heart disease Father     Liver disease Father     Multiple myeloma Sister     Breast cancer Sister     Urolithiasis Family     Alcohol abuse Neg Hx     Depression Neg Hx     Drug abuse Neg Hx     Substance Abuse Neg Hx     Mental illness Neg Hx      I have reviewed and agree with the history as documented  E-Cigarette/Vaping    E-Cigarette Use Never User      E-Cigarette/Vaping Substances    Nicotine No     THC No     CBD No     Flavoring No     Other No     Unknown No      Social History     Tobacco Use    Smoking status: Never Smoker    Smokeless tobacco: Never Used   Vaping Use    Vaping Use: Never used   Substance Use Topics    Alcohol use: Not Currently    Drug use: No       Review of Systems   Constitutional: Negative for chills and fever  HENT: Negative for congestion and rhinorrhea  Respiratory: Negative for cough and shortness of breath  Cardiovascular: Positive for chest pain  Negative for leg swelling  Gastrointestinal: Positive for diarrhea, nausea and vomiting  Negative for blood in stool and constipation  Genitourinary: Negative for dysuria and flank pain  Musculoskeletal: Negative for arthralgias and myalgias  Skin: Negative for rash and wound  Neurological: Negative for dizziness, weakness, numbness and headaches  Psychiatric/Behavioral: Negative for behavioral problems  Physical Exam  Physical Exam  Vitals and nursing note reviewed  Constitutional:       General: He is not in acute distress  Appearance: He is well-developed  He is obese  He is ill-appearing (vomiting)  He is not toxic-appearing     HENT: Head: Normocephalic and atraumatic  Eyes:      Conjunctiva/sclera: Conjunctivae normal    Cardiovascular:      Rate and Rhythm: Normal rate and regular rhythm  Heart sounds: Murmur heard  Systolic murmur is present  Pulmonary:      Effort: Pulmonary effort is normal  No respiratory distress  Breath sounds: Normal breath sounds  Abdominal:      Palpations: Abdomen is soft  Tenderness: There is no abdominal tenderness  Musculoskeletal:      Cervical back: Neck supple  Right lower leg: No tenderness  Edema (1+) present  Left lower leg: No tenderness  Edema (1+) present  Skin:     General: Skin is warm and dry  Neurological:      Mental Status: He is alert           Vital Signs  ED Triage Vitals [07/14/22 0320]   Temperature Pulse Respirations Blood Pressure SpO2   98 1 °F (36 7 °C) 80 19 (!) 221/97 96 %      Temp Source Heart Rate Source Patient Position - Orthostatic VS BP Location FiO2 (%)   Oral Monitor Lying Right arm --      Pain Score       6           Vitals:    07/14/22 0635 07/14/22 0700 07/14/22 0743 07/14/22 0800   BP: 159/70 (!) 192/77 (!) 166/110 (!) 183/81   Pulse: 78  74 74   Patient Position - Orthostatic VS: Sitting            Visual Acuity  Visual Acuity    Flowsheet Row Most Recent Value   R Pupil Size (mm) 3          ED Medications  Medications   ondansetron (ZOFRAN) injection 4 mg (4 mg Intravenous Given 7/14/22 0339)   labetalol (NORMODYNE) injection 10 mg (10 mg Intravenous Given 7/14/22 0758)       Diagnostic Studies  Results Reviewed     Procedure Component Value Units Date/Time    HS Troponin I 4hr [[id number]]  (Normal) Collected: 07/14/22 0740    Lab Status: Final result Specimen: Blood from Hand, Right Updated: 07/14/22 0818     hs TnI 4hr 38 ng/L      Delta 4hr hsTnI 5 ng/L     HS Troponin I 2hr [[id number]]  (Normal) Collected: 07/14/22 0526    Lab Status: Final result Specimen: Blood from Arm, Right Updated: 07/14/22 0557     hs TnI 2hr 39 ng/L Delta 2hr hsTnI 6 ng/L     B-Type Natriuretic Peptide(BNP), AN, [place], [facility] Only [[id number]]  (Abnormal) Collected: 07/14/22 0339    Lab Status: Final result Specimen: Blood from Arm, Right Updated: 07/14/22 0548      pg/mL     HS Troponin 0hr (reflex protocol) [[id number]]  (Normal) Collected: 07/14/22 0339    Lab Status: Final result Specimen: Blood from Arm, Right Updated: 07/14/22 0434     hs TnI 0hr 33 ng/L     Comprehensive metabolic panel [[id number]]  (Abnormal) Collected: 07/14/22 0339    Lab Status: Final result Specimen: Blood from Arm, Right Updated: 07/14/22 0425     Sodium 135 mmol/L      Potassium 5 2 mmol/L      Chloride 100 mmol/L      CO2 22 mmol/L      ANION GAP 13 mmol/L      BUN 58 mg/dL      Creatinine 8 34 mg/dL      Glucose 121 mg/dL      Calcium 9 8 mg/dL      AST 10 U/L      ALT 11 U/L      Alkaline Phosphatase 77 U/L      Total Protein 7 2 g/dL      Albumin 4 2 g/dL      Total Bilirubin 0 53 mg/dL      eGFR 6 ml/min/1 73sq m     Narrative:      [place] guidelines for Chronic Kidney Disease (CKD):     Stage 1 with normal or high GFR (GFR > 90 mL/min/1 73 square meters)    Stage 2 Mild CKD (GFR = 60-89 mL/min/1 73 square meters)    Stage 3A Moderate CKD (GFR = 45-59 mL/min/1 73 square meters)    Stage 3B Moderate CKD (GFR = 30-44 mL/min/1 73 square meters)    Stage 4 Severe CKD (GFR = 15-29 mL/min/1 73 square meters)    Stage 5 End Stage CKD (GFR <15 mL/min/1 73 square meters)  Note: GFR calculation is accurate only with a steady state creatinine    Magnesium [[id number]]  (Normal) Collected: 07/14/22 0339    Lab Status: Final result Specimen: Blood from Arm, Right Updated: 07/14/22 0425     Magnesium 2 2 mg/dL     CBC and differential [[id number]]  (Abnormal) Collected: 07/14/22 0339    Lab Status: Final result Specimen: Blood from Arm, Right Updated: 07/14/22 0407     WBC 7 63 Thousand/uL      RBC 3 98 Million/uL      Hemoglobin 11 8 g/dL Hematocrit 37 9 %      MCV 95 fL      MCH 29 6 pg      MCHC 31 1 g/dL      RDW 14 1 %      MPV 10 4 fL      Platelets 214 Thousands/uL      nRBC 0 /100 WBCs      Neutrophils Relative 71 %      Immat GRANS % 1 %      Lymphocytes Relative 16 %      Monocytes Relative 9 %      Eosinophils Relative 2 %      Basophils Relative 1 %      Neutrophils Absolute 5 43 Thousands/µL      Immature Grans Absolute 0 08 Thousand/uL      Lymphocytes Absolute 1 21 Thousands/µL      Monocytes Absolute 0 70 Thousand/µL      Eosinophils Absolute 0 17 Thousand/µL      Basophils Absolute 0 04 Thousands/µL                  XR chest 2 views   ED Interpretation by Bonilla Dodd PA-C (07/14 0412)   Mild pulmonary congestion, otherwise no evidence of infiltrate, pneumothorax, or other acute cardiopulmonary disease as interpreted by me                 Procedures  Procedures         ED Course  The patient is a 70-year-old male with PMH of CAD, end-stage renal disease, and diabetes mellitus that presents to the emergency department for evaluation of chest pain, nausea, vomiting and diarrhea  The patient reports that he has had a sharp chest pain in the center of his chest intermittently x 3 days, which he is currently experiencing  The patient otherwise denies hematemesis, melena, hematochezia, fevers, dyspnea, cough, congestion, chills,  or loss of consciousness  The patient takes ASA and Eliquis which he has been compliant with  On exam, patient is vomiting brown vomit  No hematemesis  No abdominal tenderness  Patient notably hypertensive  Lungs clear to auscultation bilaterally, no rales or rhonchi  Heart rate and rhythm regular  No leg swelling       ED Course as of 07/14/22 0844   Thu Jul 14, 2022   0348 EKG: NSR at 78 BPM, RBBB, Qtc 467, , no acute ischemic changes, no significant change from previous    0448 hs TnI 0hr: 33  Similar to patient's baseline   0549 BNP(!): 236  Decreased from baseline   0612 Delta 2hr hsTnI: 6   0805 Case discussed with [facility] and Nephrology; family reports that patient's dialysis center informed him that they will not be able to dialyze him today as he is in the ED and missed his appointment  I discussed this with nephrology on-call; they request patient be admitted  SLIM requesting patient be dialyzed in ED rather than admitted  Nephrology and SLIM consults placed   0806 Blood Pressure(!): 183/81  Labetalol ordered   0837 Case discussed with Nephrology who reach out to patient's dialysis center; they would be able to take patient if he is able to arrive by 9:00 a m     They will not be able to dialyze him if he does not arrive at this time  Per LISET note, liset does not feel as though patient should be admitted  Will discharge patient for prompt dialysis      At the time of discharge, the patient is in no acute distress  Patient's blood pressure has improved  He denies having any chest pain or nausea at this time  Vital signs otherwise stable  I discussed with the patient the diagnosis, treatment plan, follow-up, return precautions, and discharge instructions; they were given the opportunity to ask questions and verbalized understanding  HEART Risk Score    Flowsheet Row Most Recent Value   Heart Score Risk Calculator    History 2 Filed at: 07/14/2022 0622   ECG 1 Filed at: 07/14/2022 0622   Age 1 Filed at: 07/14/2022 0622   Risk Factors 2 Filed at: 07/14/2022 0622   Troponin 2 Filed at: 07/14/2022 3321   HEART Score 8 Filed at: 07/14/2022 8378          SBIRT 22yo+    Flowsheet Row Most Recent Value   SBIRT (23 yo +)    In order to provide better care to our patients, we are screening all of our patients for alcohol and drug use  Would it be okay to ask you these screening questions?  Unable to answer at this time Filed at: 07/14/2022 0330          MDM  Number of Diagnoses or Management Options  Diarrhea: new and requires workup  ESRD on dialysis [facility]): established and worsening  Hypertension, unspecified type: established and worsening  Nausea & vomiting: new and requires workup  Other chest pain: new and requires workup     Amount and/or Complexity of Data Reviewed  Clinical lab tests: ordered and reviewed  Tests in the radiology section of CPT®: ordered and reviewed  Tests in the medicine section of CPT®: ordered and reviewed  Decide to obtain previous medical records or to obtain history from someone other than the patient: yes  Review and summarize past medical records: yes  Discuss the patient with other providers: yes (ED attending, Nephrology, [facility])  Independent visualization of images, tracings, or specimens: yes    Risk of Complications, Morbidity, and/or Mortality  Presenting problems: moderate  Management options: moderate    Patient Progress  Patient progress: stable      Disposition  Final diagnoses:   ESRD on dialysis [facility])   Other chest pain   Nausea & vomiting   Diarrhea   Hypertension, unspecified type   Elevated troponin with chest pain     Time reflects when diagnosis was documented in both MDM as applicable and the Disposition within this note     Time User Action Codes Description Comment    7/14/2022  7:57 AM PsailaNess Furnish P Add [N18 6] ESRD (end stage renal disease) ([facility] )     7/14/2022  8:01 AM Everet Loose Add [N18 6,  Z99 2] ESRD on dialysis ([facility] )     7/14/2022  8:01 AM Everet Loose Add [R07 89] Other chest pain     7/14/2022  8:01 AM Everet Loose Add [R11 2] Nausea & vomiting     7/14/2022  8:01 AM Everet Loose Add [R19 7] Diarrhea     7/14/2022  8:01 AM Everet Loose Add [I10] Hypertension, unspecified type     7/14/2022  8:01 AM Everet Loose Add [R77 8] Elevated troponin with chest pain     7/14/2022  8:44 AM Everet Loose Modify [N18 6] ESRD (end stage renal disease) ([facility] )     7/14/2022  8:44 AM Everet Loose Modify [R77 8] Elevated troponin with chest pain       ED Disposition     ED Disposition   Discharge    Condition   Stable Date/Time   Thu Jul 14, 2022  8:36 AM    Comment   Ludwig Boxer [facility] discharge to home/self care  Follow-up Information    None         Patient's Medications   Discharge Prescriptions    No medications on file       No discharge procedures on file      PDMP Review       Value Time User    PDMP Reviewed  Yes 7/14/2022  3:15 AM Artie Meyer MD          ED Provider  Electronically Signed by           Tim Grajeda PA-C  07/14/22 0845

## 2022-07-14 NOTE — CONSULTS
Consultation - Nephrology   Syed Kc 58 y o  male MRN: [id number]  Unit/Bed#: ED 16 Encounter: [id number]    ASSESSMENT/PLAN:  ESRD on HD MWF but transitioning today to TTS [facility] :  -last treatment 7/11/22  -EDW: 102 7 kg  -electrolytes stable  -clinically, examines euvolemic  -next treatment today  Discussed at length with dialysis nurse at established outpatient dialysis center, [facility] able to accommodate patient for his scheduled hemodialysis this morning in light of discharged from ER now  Will plan to continue regular Tuesday, Thursday, Saturday schedule at established outpatient dialysis center  -d/w Dr Taurus Jade    Access: LUE AVF  -+thrill/bruit   -site clear  -continue to use for hemodialysis access    HTN/volume status:  -BP elevated but pt held home meds  -clinically, examines euvolemic  -maintain goal BP <140/90  -home medications:  Carvedilol 12 5 mg b i d , torsemide 50 mg daily on non dialysis days   -continue UF with dialysis as BP tolerates  -continue to monitor    Anemia in CKD:  -Hbg 11 8 and at goal   Goal Hgb >10  -continue to monitor  -transfuse for Hgb <7 0    Electrolytes/Acid base:  -stable  K+ 5 2  -will manage through dialysis today at established outpatient facility  -continue to monitor    Bone mineral disease of renal origin:  -secondary hyperparathyroidism:  on Sensipar 3x/week  -continue to monitor phosphorus, PTH, calcium, magnesium as outpatient    Nausea/vomiting:  -symptoms resolved and patient tolerating oral diet  -cardiac workup to include troponins are negative  -hemodynamics stable  -SLIM consult appreciated    New  -anticipated discharge per SLIM and ER attending        HISTORY OF PRESENT ILLNESS:  Requesting Physician: No att  providers found  Reason for Consult: ESRD on HD    Syed Kc is a 58y o  year old male with ESRD on HD previously MWF and now transitioning to TTS today @ [facility], CAD, s/p high risk staged cardiac catheterizations at [facility] who presented to ER at [address] [place] [address] after wakening with nausea and 3 episodes of vomiting at home  He denies chest pain  Troponins in the ER negative x3  Last hemodialysis on 7/11/22 with plans to transition to TTS today  Patient due for hemodialysis today and confirmed after speaking with outpatient hemodialysis center  Patient denies nausea, current vomiting, abdominal pain, chest pain, dyspnea, lower extremity edema  Labs in the ER stable with potassium 5 2  No clinical evidence of fluid overload  A renal consultation is requested today for assistance in the management of ESRD  Natalee Escobedo is a known ESRD patient who undergoes maintenance hemodialysis at [facility] on MWF but now transitioned to TTS planned for today  PAST MEDICAL HISTORY:  Past Medical History:   Diagnosis Date    Cerebrovascular accident (CVA) due to thrombosis of left middle cerebral artery ([facility] [address] ) 7/29/2018    Chronic kidney disease     Diabetes mellitus ([facility] [address] )     GERD (gastroesophageal reflux disease)     Hypercholesteremia     Hyperlipidemia     Hypertension     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    Neuropathy     Obesity     Osteomyelitis ([facility] [address] )     last assessed 11/4/16    PVC's (premature ventricular contractions)     sees cardiology Dr Damari camargo    Stroke [facility])     last weeof July 2018 [address] [place] [address]    TIA (transient ischemic attack) 10/28/2018       PAST SURGICAL HISTORY:  Past Surgical History:   Procedure Laterality Date    ABDOMINAL SURGERY      CARDIAC CATHETERIZATION N/A 5/2/2022    Procedure: Cardiac Coronary Angiogram;  Surgeon: Vitaliy Bunch MD;  Location: [facility] CARDIAC CATH LAB; Service: Cardiology    CARDIAC CATHETERIZATION N/A 5/2/2022    Procedure: Cardiac pci;  Surgeon: Vitaliy Bunch MD;  Location: [facility] CARDIAC CATH LAB;   Service: Cardiology    CHOLECYSTECTOMY      Percutaneous    COLONOSCOPY      CYSTOSCOPY      OTHER SURGICAL HISTORY "stimulator to control bowel movements"    RI ESOPHAGOGASTRODUODENOSCOPY TRANSORAL DIAGNOSTIC N/A 9/27/2016    Procedure: ESOPHAGOGASTRODUODENOSCOPY (EGD); Surgeon: Symone Seth MD;  Location: AN GI LAB; Service: Gastroenterology    RI LAP,CHOLECYSTECTOMY N/A 2/29/2016    Procedure: LAPAROSCOPIC CHOLECYSTECTOMY ;  Surgeon: Migeul Biggs DO;  Location: [facility] Main OR;  Service: General    ROTATOR CUFF REPAIR Right     TOE AMPUTATION Right 10/28/2016    Procedure: 3RD TOE AMPUTATION ;  Surgeon: Kristen Hassan DPM;  Location: [facility] Main OR;  Service:        ALLERGIES:  Allergies   Allergen Reactions    Shrimp Extract Allergy Skin Test - Food Allergy Rash     Other reaction(s): Unknown       SOCIAL HISTORY:  Social History     Substance and Sexual Activity   Alcohol Use Not Currently     Social History     Substance and Sexual Activity   Drug Use No     Social History     Tobacco Use   Smoking Status Never Smoker   Smokeless Tobacco Never Used       FAMILY HISTORY:  Family History   Problem Relation Age of Onset    Leukemia Mother     Liver disease Mother     Lung cancer Mother         heavy smoker - 3 ppd    Heart disease Father     Liver disease Father     Multiple myeloma Sister     Breast cancer Sister     Urolithiasis Family     Alcohol abuse Neg Hx     Depression Neg Hx     Drug abuse Neg Hx     Substance Abuse Neg Hx     Mental illness Neg Hx        MEDICATIONS:  No current facility-administered medications for this encounter      Current Outpatient Medications:     aspirin (ECOTRIN LOW STRENGTH) 81 mg EC tablet, Take 81 mg by mouth daily Resume on 8/14  , Disp: , Rfl:     atorvastatin (LIPITOR) 40 mg tablet, TAKE 1 TABLET BY MOUTH DAILY WITH DINNER, Disp: 90 tablet, Rfl: 1    BD Pen Needle Adina U/F 32G X 4 MM MISC, USE TO INJECT INSULIN 4 TIMES DAILY, Disp: 400 each, Rfl: 1    Blood Glucose Monitoring Suppl (True Metrix Meter) w/Device KIT, Use to test blood sugars 3 times daily, Disp: 1 kit, Rfl: 0    Blood Pressure Monitoring (BLOOD PRESSURE CUFF) MISC, Use to check blood pressure before taking blood pressure medication and 1 hour after and follow instructions provided in discharge instructions based on the readings  , Disp: 1 each, Rfl: 0    calcium acetate (CALPHRON) 667 mg, TAKE 2 TABLETS BY MOUTH THREE TIMES DAILY WITH MEALS, Disp: , Rfl:     carvedilol (COREG) 12 5 mg tablet, Take 1 tablet (12 5 mg total) by mouth 2 (two) times a day with meals, Disp: 60 tablet, Rfl: 5    Cholecalciferol (Vitamin D3) 1 25 MG (73305 UT) CAPS, TAKE 1 CAPSULE BY MOUTH ONE TIME PER WEEK, Disp: 12 capsule, Rfl: 2    Cinacalcet HCl (SENSIPAR PO), Take 60 mg by mouth 3 (three) times a week, Disp: , Rfl:     clopidogrel (PLAVIX) 75 mg tablet, Take 1 tablet (75 mg total) by mouth in the morning , Disp: 90 tablet, Rfl: 2    glucose blood (True Metrix Blood Glucose Test) test strip, Use 1 each 3 (three) times a day Use as instructed, Disp: 300 strip, Rfl: 1    insulin lispro (HumaLOG KwikPen) 100 units/mL injection pen, INJECT 4 UNITS 3 TIMES A DAY WITH MEALS PLUS SCALE (max daily dose 42 units), Disp: 45 mL, Rfl: 1    Insulin Syringe-Needle U-100 (B-D INS SYRINGE 0 5CC/30GX1/2") 30G X 1/2" 0 5 ML MISC, Inject once daily, Disp: 100 each, Rfl: 1    isosorbide mononitrate (IMDUR) 30 mg 24 hr tablet, Take 1 tablet (30 mg total) by mouth daily, Disp: 30 tablet, Rfl: 0    Lancets Ultra Thin 30G MISC, Use 3 times a day, Disp: 300 each, Rfl: 0    Lantus 100 UNIT/ML subcutaneous injection, INJECT 14 UNITS UNDER THE SKIN DAILY, Disp: 10 mL, Rfl: 0    ONETOUCH DELICA LANCETS 93E MISC, by Does not apply route 3 (three) times a day, Disp: 270 each, Rfl: 1    Semaglutide,0 25 or 0 5MG/DOS, (Ozempic, 0 25 or 0 5 MG/DOSE,) 2 MG/1 5ML SOPN, Inject 0 25 mg once a week, Disp: 3 mL, Rfl: 5    sertraline (ZOLOFT) 25 mg tablet, Take 1 tablet (25 mg total) by mouth daily at bedtime, Disp: 90 tablet, Rfl: 1    TORSEMIDE PO, Take 50 mg by mouth Pt takes 50 mg daily on non- dialysis days: sat, sun, Tues, and thrusday  Pt takes 10 mg of torsemide daily on dialysis days m- w- f  , Disp: , Rfl:     REVIEW OF SYSTEMS:  A complete 10 point review of systems was performed and found to be negative unless otherwise noted below or in the HPI  General: No fevers, chills  Cardiovascular: No chest pain, shortness of breath, palpitations, leg edema  Respiratory: No cough, sputum production, shortness of breath  Gastrointestinal: No nausea, vomiting, abdominal pain, diarrhea  Genitourinary: No hematuria  PHYSICAL EXAM:  Current Weight: Weight - Scale: 107 kg (235 lb)  First Weight: Weight - Scale: 107 kg (235 lb)  Vitals:    07/14/22 0700 07/14/22 0743 07/14/22 0800 07/14/22 0830   BP: (!) 192/77 (!) 166/110 (!) 183/81 (!) 185/77   BP Location:       Pulse:  74 74 72   Resp:  18     Temp:       TempSrc:       SpO2:  95% 94%    Weight:       Height:         No intake or output data in the 24 hours ending 07/14/22 0924  General:  Awake, alert, appears comfortable and in no acute distress  Nontoxic  Skin:  No rash, warm, good skin turgor   Eyes:  PERRL, EOMI, sclerae nonicteric   no periorbital edema   ENT:  Moist mucous membranes  Neck:  Trachea midline, symmetric  No JVD  No carotid bruits  Chest:  Clear to auscultation bilaterally without wheezes, crackles or rhonchi  CVS:  Regular rate and rhythm without murmur, gallop or rub  S1 and S2 identified and normal   No S3, S4    Abdomen:  Soft, nontender, nondistended without masses  Normal bowel sounds x 4 quadrants  No bruit  Extremities:  Warm, pink, motor and sensory intact and well perfused  No cyanosis, pallor  No BLE edema  Neuro:  Awake, alert, oriented x3  Grossly intact  Psych:  Appropriate affect  Mentating appropriately  Normal mental status exam  Access:  LUE AVF with +thrill, bruit  Site clear         Invasive Devices: None     Lab Results:   Results from last 7 days Lab Units 07/14/22  0339   WBC Thousand/uL 7 63   HEMOGLOBIN g/dL 11 8*   HEMATOCRIT % 37 9   PLATELETS Thousands/uL 143*   SODIUM mmol/L 135   POTASSIUM mmol/L 5 2   CHLORIDE mmol/L 100   CO2 mmol/L 22   BUN mg/dL 58*   CREATININE mg/dL 8 34*   CALCIUM mg/dL 9 8   MAGNESIUM mg/dL 2 2   ALK PHOS U/L 77   ALT U/L 11   AST U/L 10*     Lab Results   Component Value Date    CALCIUM 9 8 07/14/2022    PHOS 6 4 (H) 05/01/2022       EMR, including Care Everywhere, [facility],  [facility] and outpatient notes reviewed  I have personally reviewed the blood work as stated above and in my note     I have personally reviewed primary medical service and previous nephrology note

## 2022-07-14 NOTE — TELEPHONE ENCOUNTER
Wife called pt started Ozempic Monday and has vomited 2x since   She did take him to hospital but they didn't know what caused it she believes its from [facility]

## 2022-07-14 NOTE — DISCHARGE INSTRUCTIONS
Go to dialysis today as instructed    Return to the ER for any chest pain, trouble breathing, vomiting, nausea, abdominal pain, blood in urine, blood in vomit or stool    Follow up with your cardiologist, nephrologist, and PCP

## 2022-07-15 ENCOUNTER — APPOINTMENT (OUTPATIENT)
Dept: CARDIAC REHAB | Facility: CLINIC | Age: 62
End: 2022-07-15
Payer: MEDICARE

## 2022-07-18 ENCOUNTER — CLINICAL SUPPORT (OUTPATIENT)
Dept: CARDIAC REHAB | Facility: CLINIC | Age: 62
End: 2022-07-18
Payer: MEDICARE

## 2022-07-18 DIAGNOSIS — Z98.61 POSTSURGICAL PERCUTANEOUS TRANSLUMINAL CORONARY ANGIOPLASTY STATUS: Primary | ICD-10-CM

## 2022-07-18 PROCEDURE — 93798 PHYS/QHP OP CAR RHAB W/ECG: CPT

## 2022-07-19 ENCOUNTER — OFFICE VISIT (OUTPATIENT)
Dept: PODIATRY | Facility: CLINIC | Age: 62
End: 2022-07-19
Payer: MEDICARE

## 2022-07-19 VITALS — HEIGHT: 69 IN | WEIGHT: 226 LBS | BODY MASS INDEX: 33.47 KG/M2

## 2022-07-19 DIAGNOSIS — B35.1 ONYCHOMYCOSIS: ICD-10-CM

## 2022-07-19 DIAGNOSIS — E11.42 DIABETIC POLYNEUROPATHY ASSOCIATED WITH TYPE 2 DIABETES MELLITUS (HCC): Primary | ICD-10-CM

## 2022-07-19 DIAGNOSIS — Z89.421 ABSENCE OF TOE OF RIGHT FOOT (HCC): ICD-10-CM

## 2022-07-19 DIAGNOSIS — L85.1 ACQUIRED KERATODERMA: ICD-10-CM

## 2022-07-19 PROCEDURE — 11056 PARNG/CUTG B9 HYPRKR LES 2-4: CPT | Performed by: PODIATRIST

## 2022-07-19 PROCEDURE — 11721 DEBRIDE NAIL 6 OR MORE: CPT | Performed by: PODIATRIST

## 2022-07-19 NOTE — PROGRESS NOTES
PATIENT:  Efe Haile    1960      ASSESSMENT:     1  Diabetic polyneuropathy associated with type 2 diabetes mellitus ([address] )     2  Absence of toe of right foot ([address] )     3  Acquired keratoderma  Debridement   4  Onychomycosis  Debridement       PLAN:  Educated disease prevention and risks related to diabetes  Educated proper daily foot care and exam   Instructed proper skin care / protection and footwear  Discussed proper BS control with diet  RA in 9 weeks  Procedure: All mycotic toenails were reduced and debrided in length, width, and girth using a nail nipper and dremel  All hyperkeratotic skin lesion(s) were sharply pared with a scalpel with no bleeding or evidence of ulceration  Patient tolerated procedure(s) well without complications  Subjective:      HPI:   The patients presents for diabetic foot care and exam   He has high risk diabetic foot with history of ulcer and amputation  No ulcer in his feet  BS has been better  The following portions of the patient's history were reviewed and updated as appropriate: allergies, current medications, past family history, past medical history, past social history, past surgical history and problem list   All pertinent labs and images were reviewed      Past Medical History  Past Medical History:   Diagnosis Date    Cerebrovascular accident (CVA) due to thrombosis of left middle cerebral artery ([facility] [address] ) 7/29/2018    Chronic kidney disease     Diabetes mellitus ([facility] [address] )     GERD (gastroesophageal reflux disease)     Hypercholesteremia     Hyperlipidemia     Hypertension     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    Neuropathy     Obesity     Osteomyelitis ([facility] [address] )     last assessed 11/4/16    PVC's (premature ventricular contractions)     sees cardiology Dr Hamilton camargo    Stroke [facility])     last weeof July 2018 [address] [facility]    TIA (transient ischemic attack) 10/28/2018       Past Surgical History  Past Surgical History:   Procedure Laterality Date    ABDOMINAL SURGERY      CARDIAC CATHETERIZATION N/A 5/2/2022    Procedure: Cardiac Coronary Angiogram;  Surgeon: Greg Bowers MD;  Location: [facility] CARDIAC CATH LAB; Service: Cardiology    CARDIAC CATHETERIZATION N/A 5/2/2022    Procedure: Cardiac pci;  Surgeon: Greg Bowers MD;  Location: [facility] CARDIAC CATH LAB; Service: Cardiology    CHOLECYSTECTOMY      Percutaneous    COLONOSCOPY      CYSTOSCOPY      OTHER SURGICAL HISTORY      "stimulator to control bowel movements"    NV ESOPHAGOGASTRODUODENOSCOPY TRANSORAL DIAGNOSTIC N/A 9/27/2016    Procedure: ESOPHAGOGASTRODUODENOSCOPY (EGD); Surgeon: Clary Bains MD;  Location: [facility] GI LAB; Service: Gastroenterology    NV LAP,CHOLECYSTECTOMY N/A 2/29/2016    Procedure: LAPAROSCOPIC CHOLECYSTECTOMY ;  Surgeon: Lindsey Campbell DO;  Location: [facility] Main OR;  Service: General    ROTATOR CUFF REPAIR Right     TOE AMPUTATION Right 10/28/2016    Procedure: 3RD TOE AMPUTATION ;  Surgeon: Shanda Rubin DPM;  Location: [facility] Main OR;  Service:         Allergies:  Shrimp extract allergy skin test - food allergy    Medications:  Current Outpatient Medications   Medication Sig Dispense Refill    aspirin (ECOTRIN LOW STRENGTH) 81 mg EC tablet Take 81 mg by mouth daily Resume on 8/14        atorvastatin (LIPITOR) 40 mg tablet TAKE 1 TABLET BY MOUTH DAILY WITH DINNER 90 tablet 1    BD Pen Needle Adina U/F 32G X 4 MM MISC USE TO INJECT INSULIN 4 TIMES DAILY 400 each 1    Blood Glucose Monitoring Suppl (True Metrix Meter) w/Device KIT Use to test blood sugars 3 times daily 1 kit 0    Blood Pressure Monitoring (BLOOD PRESSURE CUFF) MISC Use to check blood pressure before taking blood pressure medication and 1 hour after and follow instructions provided in discharge instructions based on the readings   1 each 0    calcium acetate (CALPHRON) 667 mg TAKE 2 TABLETS BY MOUTH THREE TIMES DAILY WITH MEALS      Cholecalciferol (Vitamin D3) 1 25 MG (14519 UT) CAPS TAKE 1 CAPSULE BY MOUTH ONE TIME PER WEEK 12 capsule 2    Cinacalcet HCl (SENSIPAR PO) Take 60 mg by mouth 3 (three) times a week      clopidogrel (PLAVIX) 75 mg tablet Take 1 tablet (75 mg total) by mouth in the morning  90 tablet 2    glucose blood (True Metrix Blood Glucose Test) test strip Use 1 each 3 (three) times a day Use as instructed 300 strip 1    insulin lispro (HumaLOG KwikPen) 100 units/mL injection pen INJECT 4 UNITS 3 TIMES A DAY WITH MEALS PLUS SCALE (max daily dose 42 units) 45 mL 1    Insulin Syringe-Needle U-100 (B-D INS SYRINGE 0 5CC/30GX1/2") 30G X 1/2" 0 5 ML MISC Inject once daily 100 each 1    Lancets Ultra Thin 30G MISC Use 3 times a day 300 each 0    Lantus 100 UNIT/ML subcutaneous injection INJECT 14 UNITS UNDER THE SKIN DAILY 10 mL 0    ONETOUCH DELICA LANCETS 52Z MISC by Does not apply route 3 (three) times a day 270 each 1    Semaglutide,0 25 or 0 5MG/DOS, (Ozempic, 0 25 or 0 5 MG/DOSE,) 2 MG/1 5ML SOPN Inject 0 25 mg once a week 3 mL 5    sertraline (ZOLOFT) 25 mg tablet Take 1 tablet (25 mg total) by mouth daily at bedtime 90 tablet 1    TORSEMIDE PO Take 50 mg by mouth Pt takes 50 mg daily on non- dialysis days: sat, sun, Tues, and thrusday  Pt takes 10 mg of torsemide daily on dialysis days m- w- f        carvedilol (COREG) 12 5 mg tablet Take 1 tablet (12 5 mg total) by mouth 2 (two) times a day with meals 60 tablet 5    isosorbide mononitrate (IMDUR) 30 mg 24 hr tablet Take 1 tablet (30 mg total) by mouth daily 30 tablet 0     No current facility-administered medications for this visit         Social History:  Social History     Socioeconomic History    Marital status: /Civil Union     Spouse name: None    Number of children: None    Years of education: None    Highest education level: Not asked   Occupational History    Occupation: disabled   Tobacco Use    Smoking status: Never Smoker    Smokeless tobacco: Never Used Vaping Use    Vaping Use: Never used   Substance and Sexual Activity    Alcohol use: Not Currently    Drug use: No    Sexual activity: Not Currently   Other Topics Concern    None   Social History Narrative    Daily caffeine consumption 2-3 servings a day     Social Determinants of Health     Financial Resource Strain: Not on file   Food Insecurity: Not on file   Transportation Needs: No Transportation Needs    Lack of Transportation (Medical): No    Lack of Transportation (Non-Medical): No   Physical Activity: Not on file   Stress: Not on file   Social Connections: Not on file   Intimate Partner Violence: Not on file   Housing Stability: Not on file        Review of Systems   Constitutional: Negative for chills and fever  Respiratory: Negative for cough and shortness of breath  Cardiovascular: Negative for chest pain  Gastrointestinal: Negative for constipation, diarrhea, nausea and vomiting  Neurological: Positive for numbness  Objective:      Ht 5' 9" (1 753 m) Comment: verbal  Wt 103 kg (226 lb)   BMI 33 37 kg/m²          Physical Exam  Vitals reviewed  Constitutional:       General: He is not in acute distress  Appearance: He is well-developed  He is not toxic-appearing or diaphoretic  Cardiovascular:      Rate and Rhythm: Normal rate and regular rhythm  Pulses:           Dorsalis pedis pulses are 1+ on the right side and 1+ on the left side  Posterior tibial pulses are 1+ on the right side and 1+ on the left side  Pulmonary:      Effort: Pulmonary effort is normal  No respiratory distress  Musculoskeletal:         General: Deformity present  No tenderness  Right foot: No foot drop  Left foot: No foot drop  Comments: Hammertoe deformity noted  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  Feet:      Right foot:      Skin integrity: Dry skin present  No ulcer, skin breakdown, erythema, warmth or callus        Left foot:      Skin integrity: Dry skin present  No ulcer, skin breakdown, erythema, warmth or callus  Skin:     General: Skin is warm  Capillary Refill: Capillary refill takes less than 2 seconds  Coloration: Skin is not cyanotic or mottled  Findings: No ecchymosis or erythema  Rash is not purpuric  Nails: There is no clubbing  Comments: Thick mycotic nails X 7 with discoloration and onycholysis  HPK X 2 on left 1st met head and submet 2  He has class finding with previous toe amputation  Neurological:      General: No focal deficit present  Mental Status: He is alert and oriented to person, place, and time  Cranial Nerves: No cranial nerve deficit  Sensory: Sensory deficit present  Motor: No weakness  Psychiatric:         Mood and Affect: Mood normal          Behavior: Behavior normal          Thought Content:  Thought content normal          Judgment: Judgment normal

## 2022-07-20 ENCOUNTER — CLINICAL SUPPORT (OUTPATIENT)
Dept: CARDIAC REHAB | Facility: CLINIC | Age: 62
End: 2022-07-20
Payer: MEDICARE

## 2022-07-20 DIAGNOSIS — Z98.61 POSTSURGICAL PERCUTANEOUS TRANSLUMINAL CORONARY ANGIOPLASTY STATUS: Primary | ICD-10-CM

## 2022-07-20 PROCEDURE — 93798 PHYS/QHP OP CAR RHAB W/ECG: CPT

## 2022-07-22 ENCOUNTER — CLINICAL SUPPORT (OUTPATIENT)
Dept: CARDIAC REHAB | Facility: CLINIC | Age: 62
End: 2022-07-22
Payer: MEDICARE

## 2022-07-22 DIAGNOSIS — Z98.61 POSTSURGICAL PERCUTANEOUS TRANSLUMINAL CORONARY ANGIOPLASTY STATUS: Primary | ICD-10-CM

## 2022-07-22 PROCEDURE — 93798 PHYS/QHP OP CAR RHAB W/ECG: CPT

## 2022-07-24 ENCOUNTER — OFFICE VISIT (OUTPATIENT)
Dept: URGENT CARE | Facility: CLINIC | Age: 62
End: 2022-07-24
Payer: MEDICARE

## 2022-07-24 VITALS
RESPIRATION RATE: 16 BRPM | TEMPERATURE: 97.8 F | SYSTOLIC BLOOD PRESSURE: 159 MMHG | HEART RATE: 70 BPM | OXYGEN SATURATION: 98 % | DIASTOLIC BLOOD PRESSURE: 73 MMHG

## 2022-07-24 DIAGNOSIS — R58 BLEEDING: ICD-10-CM

## 2022-07-24 DIAGNOSIS — R23.4 SCAB: Primary | ICD-10-CM

## 2022-07-24 PROCEDURE — G0463 HOSPITAL OUTPT CLINIC VISIT: HCPCS | Performed by: NURSE PRACTITIONER

## 2022-07-24 PROCEDURE — 99213 OFFICE O/P EST LOW 20 MIN: CPT | Performed by: NURSE PRACTITIONER

## 2022-07-24 RX ORDER — TORSEMIDE 100 MG/1
50 TABLET ORAL DAILY
COMMUNITY
Start: 2022-07-19

## 2022-07-24 NOTE — PROGRESS NOTES
[facility] [employer] Now        NAME: Susan Parr is a 58 y o  male  : 1960    MRN: [id number]  DATE: 2022  TIME: 11:09 AM    Assessment and Plan   Scab [R23 4]  1  Scab     2  Bleeding       --Bleeding from superficial scabbed abrasions successfully staunched with Surgicel  Bacitracin, 2x2, Coban wrap applied overlying  Instructions, extra supplies given in case bleeding restarts  Patient Instructions     --Leave wraps/dressing in place for 12-24 hours to ensure full resolution of bleeding, removing only if you develop numbness/color changes in hand  Carefully remove, covering with Bandaid and OTC antibiotic ointment (Bacitracin, Polysporin), changed daily, until scabbed over  --Avoid scratching area in the future  --Seek re-evaluation for recurrent bleeding and/or signs of infection (increased redness, drainage, pain, swelling)    Chief Complaint     Chief Complaint   Patient presents with    Bleeding/Bruising     Pt is on blood thinners and scraped open scabs on his arm  History of Present Illness       Here with daughter for complaints of persistently bleeding scabbed areas on right forearm (2) and right shin (1)  Had some scabs there from unknown bites(?)  Accidentally removed scabs this morning when itching  Has been bleeding persistently since despite applying pressure with towel  On blood thinners  Review of Systems   Review of Systems   Skin: Positive for wound           Current Medications       Current Outpatient Medications:     aspirin (ECOTRIN LOW STRENGTH) 81 mg EC tablet, Take 81 mg by mouth daily Resume on   , Disp: , Rfl:     atorvastatin (LIPITOR) 40 mg tablet, TAKE 1 TABLET BY MOUTH DAILY WITH DINNER, Disp: 90 tablet, Rfl: 1    BD Pen Needle Adina U/F 32G X 4 MM MISC, USE TO INJECT INSULIN 4 TIMES DAILY, Disp: 400 each, Rfl: 1    Blood Glucose Monitoring Suppl (True Metrix Meter) w/Device KIT, Use to test blood sugars 3 times daily, Disp: 1 kit, Rfl: 0    Blood Pressure Monitoring (BLOOD PRESSURE CUFF) MISC, Use to check blood pressure before taking blood pressure medication and 1 hour after and follow instructions provided in discharge instructions based on the readings  , Disp: 1 each, Rfl: 0    calcium acetate (CALPHRON) 667 mg, TAKE 2 TABLETS BY MOUTH THREE TIMES DAILY WITH MEALS, Disp: , Rfl:     Cholecalciferol (Vitamin D3) 1 25 MG (19934 UT) CAPS, TAKE 1 CAPSULE BY MOUTH ONE TIME PER WEEK, Disp: 12 capsule, Rfl: 2    Cinacalcet HCl (SENSIPAR PO), Take 60 mg by mouth 3 (three) times a week, Disp: , Rfl:     clopidogrel (PLAVIX) 75 mg tablet, Take 1 tablet (75 mg total) by mouth in the morning , Disp: 90 tablet, Rfl: 2    glucose blood (True Metrix Blood Glucose Test) test strip, Use 1 each 3 (three) times a day Use as instructed, Disp: 300 strip, Rfl: 1    insulin lispro (HumaLOG KwikPen) 100 units/mL injection pen, INJECT 4 UNITS 3 TIMES A DAY WITH MEALS PLUS SCALE (max daily dose 42 units), Disp: 45 mL, Rfl: 1    Insulin Syringe-Needle U-100 (B-D INS SYRINGE 0 5CC/30GX1/2") 30G X 1/2" 0 5 ML MISC, Inject once daily, Disp: 100 each, Rfl: 1    Lancets Ultra Thin 30G MISC, Use 3 times a day, Disp: 300 each, Rfl: 0    Lantus 100 UNIT/ML subcutaneous injection, INJECT 14 UNITS UNDER THE SKIN DAILY, Disp: 10 mL, Rfl: 0    ONETOUCH DELICA LANCETS 89R MISC, by Does not apply route 3 (three) times a day, Disp: 270 each, Rfl: 1    Semaglutide,0 25 or 0 5MG/DOS, (Ozempic, 0 25 or 0 5 MG/DOSE,) 2 MG/1 5ML SOPN, Inject 0 25 mg once a week, Disp: 3 mL, Rfl: 5    sertraline (ZOLOFT) 25 mg tablet, Take 1 tablet (25 mg total) by mouth daily at bedtime, Disp: 90 tablet, Rfl: 1    torsemide (DEMADEX) 100 mg tablet, Take 50 mg by mouth daily, Disp: , Rfl:     TORSEMIDE PO, Take 50 mg by mouth Pt takes 50 mg daily on non- dialysis days: sat, sun, Tues, and thrusday   Pt takes 10 mg of torsemide daily on dialysis days m- w- f  , Disp: , Rfl:     carvedilol (COREG) 12 5 mg tablet, Take 1 tablet (12 5 mg total) by mouth 2 (two) times a day with meals, Disp: 60 tablet, Rfl: 5    isosorbide mononitrate (IMDUR) 30 mg 24 hr tablet, Take 1 tablet (30 mg total) by mouth daily, Disp: 30 tablet, Rfl: 0    Current Allergies     Allergies as of 07/24/2022 - Reviewed 07/24/2022   Allergen Reaction Noted    Shrimp extract allergy skin test - food allergy Rash 03/03/2022            The following portions of the patient's history were reviewed and updated as appropriate: allergies, current medications, past family history, past medical history, past social history, past surgical history and problem list      Past Medical History:   Diagnosis Date    Cerebrovascular [facility]CV[facility] 7/29/2018    Chronic kidney disease     Diabetes mellitus [facility]     GERD (gastroesophageal reflux disease)     Hypercholesteremia     Hyperlipidemia     Hypertension     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    Neuropathy     Obesity     Osteomyelitis ([facility] )     last assessed 11/4/16    PVC's (premature ventricular contractions)     sees cardiology Dr Karely camargo    Stroke [facility])     last weeof July 2018 [address]    TIA (transient ischemic attack) 10/28/2018       Past Surgical History:   Procedure Laterality Date    ABDOMINAL SURGERY      CARDIAC CATHETERIZATION N/A 5/2/2022    Procedure: Cardiac Coronary Angiogram;  Surgeon: Malik Grace MD;  [facility]; Service: Cardiology    CARDIAC CATHETERIZATION N/A 5/2/2022    Procedure: Cardiac pci;  Surgeon: Malik Grace MD;  [facility]; Service: Cardiology    CHOLECYSTECTOMY      Percutaneous    COLONOSCOPY      CYSTOSCOPY      OTHER SURGICAL HISTORY      "stimulator to control bowel movements"    MS ESOPHAGOGASTRODUODENOSCOPY TRANSORAL DIAGNOSTIC N/A 9/27/2016    Procedure: ESOPHAGOGASTRODUODENOSCOPY (EGD);   Surgeon: Daisy Loaiza MD;  Location: [facility] GI LAB; Service: Gastroenterology    DC LAP,CHOLECYSTECTOMY N/A 2/29/2016    Procedure: LAPAROSCOPIC CHOLECYSTECTOMY ;  Surgeon: Alfonza Nyhan, DO;  Location: [facility] Main [facility];  Service: General    ROTATOR CUFF REPAIR Right     TOE AMPUTATION Right 10/28/2016    Procedure: 3RD TOE AMPUTATION ;  Surgeon: Nuha Hanna DPM;  Location[facility];  Service:        Family History   Problem Relation Age of Onset    Leukemia Mother     Liver disease Mother     Lung cancer Mother         heavy smoker - 3 ppd    Heart disease Father     Liver disease Father     Multiple myeloma Sister     Breast cancer Sister     Urolithiasis Family     Alcohol abuse Neg Hx     Depression Neg Hx     Drug abuse Neg Hx     Substance Abuse Neg Hx     Mental illness Neg Hx          Medications have been verified  Objective   /73   Pulse 70   Temp 97 8 °F (36 6 °C)   Resp 16   SpO2 98%   No LMP for male patient  Physical Exam     Physical Exam  Skin:     Findings: No lesion  Comments: Right forearm with two small (3-5 mm) round, superficial abrasions  Right mid shin with one small (4 mm) round, superficial abrasion  Abrasions mildly oozing small amount of bleed  No signs of bleeding, lesions elsewhere  Neurological:      Mental Status: He is alert     Psychiatric:         Mood and Affect: Mood normal

## 2022-07-24 NOTE — PATIENT INSTRUCTIONS
--Leave wraps/dressing in place for 12-24 hours to ensure full resolution of bleeding, removing only if you develop numbness/color changes in hand  Carefully remove, covering with Bandaid and OTC antibiotic ointment (Bacitracin, Polysporin), changed daily, until scabbed over  --Avoid scratching area in the future     --Seek re-evaluation for recurrent bleeding and/or signs of infection (increased redness, drainage, pain, swelling)

## 2022-07-25 ENCOUNTER — APPOINTMENT (OUTPATIENT)
Dept: LAB | Facility: CLINIC | Age: 62
End: 2022-07-25
Payer: MEDICARE

## 2022-07-25 ENCOUNTER — TRANSCRIBE ORDERS (OUTPATIENT)
Dept: LAB | Facility: CLINIC | Age: 62
End: 2022-07-25

## 2022-07-25 ENCOUNTER — CLINICAL SUPPORT (OUTPATIENT)
Dept: CARDIAC REHAB | Facility: CLINIC | Age: 62
End: 2022-07-25
Payer: MEDICARE

## 2022-07-25 DIAGNOSIS — I25.119 ATHEROSCLEROSIS OF NATIVE CORONARY ARTERY WITH ANGINA PECTORIS, UNSPECIFIED WHETHER NATIVE OR TRANSPLANTED HEART (HCC): Primary | ICD-10-CM

## 2022-07-25 DIAGNOSIS — E78.2 MIXED HYPERLIPIDEMIA: ICD-10-CM

## 2022-07-25 DIAGNOSIS — R07.9 CHEST PAIN, UNSPECIFIED TYPE: ICD-10-CM

## 2022-07-25 DIAGNOSIS — E13.69 OTHER SPECIFIED DIABETES MELLITUS WITH OTHER SPECIFIED COMPLICATION, UNSPECIFIED WHETHER LONG TERM INSULIN USE (HCC): ICD-10-CM

## 2022-07-25 DIAGNOSIS — I10 ESSENTIAL HYPERTENSION, MALIGNANT: ICD-10-CM

## 2022-07-25 DIAGNOSIS — I25.119 ATHEROSCLEROSIS OF NATIVE CORONARY ARTERY WITH ANGINA PECTORIS, UNSPECIFIED WHETHER NATIVE OR TRANSPLANTED HEART (HCC): ICD-10-CM

## 2022-07-25 DIAGNOSIS — Z98.61 POSTSURGICAL PERCUTANEOUS TRANSLUMINAL CORONARY ANGIOPLASTY STATUS: Primary | ICD-10-CM

## 2022-07-25 LAB
ALBUMIN SERPL BCP-MCNC: 3.7 G/DL (ref 3.5–5)
ALP SERPL-CCNC: 99 U/L (ref 46–116)
ALT SERPL W P-5'-P-CCNC: 22 U/L (ref 12–78)
ANION GAP SERPL CALCULATED.3IONS-SCNC: 8 MMOL/L (ref 4–13)
AST SERPL W P-5'-P-CCNC: 6 U/L (ref 5–45)
BASOPHILS # BLD AUTO: 0.03 THOUSANDS/ΜL (ref 0–0.1)
BASOPHILS NFR BLD AUTO: 0 % (ref 0–1)
BILIRUB DIRECT SERPL-MCNC: 0.11 MG/DL (ref 0–0.2)
BILIRUB SERPL-MCNC: 0.51 MG/DL (ref 0.2–1)
BUN SERPL-MCNC: 49 MG/DL (ref 5–25)
CALCIUM SERPL-MCNC: 9.6 MG/DL (ref 8.3–10.1)
CHLORIDE SERPL-SCNC: 100 MMOL/L (ref 96–108)
CHOLEST SERPL-MCNC: 82 MG/DL
CK MB SERPL-MCNC: 2.1 % (ref 0–2.5)
CK MB SERPL-MCNC: 3.5 NG/ML (ref 0–5)
CK SERPL-CCNC: 164 U/L (ref 39–308)
CO2 SERPL-SCNC: 29 MMOL/L (ref 21–32)
CREAT SERPL-MCNC: 7.62 MG/DL (ref 0.6–1.3)
EOSINOPHIL # BLD AUTO: 0.13 THOUSAND/ΜL (ref 0–0.61)
EOSINOPHIL NFR BLD AUTO: 2 % (ref 0–6)
ERYTHROCYTE [DISTWIDTH] IN BLOOD BY AUTOMATED COUNT: 14.7 % (ref 11.6–15.1)
EST. AVERAGE GLUCOSE BLD GHB EST-MCNC: 126 MG/DL
GFR SERPL CREATININE-BSD FRML MDRD: 6 ML/MIN/1.73SQ M
GLUCOSE P FAST SERPL-MCNC: 174 MG/DL (ref 65–99)
HBA1C MFR BLD: 6 %
HCT VFR BLD AUTO: 36.6 % (ref 36.5–49.3)
HDLC SERPL-MCNC: 33 MG/DL
HGB BLD-MCNC: 11.3 G/DL (ref 12–17)
IMM GRANULOCYTES # BLD AUTO: 0.08 THOUSAND/UL (ref 0–0.2)
IMM GRANULOCYTES NFR BLD AUTO: 1 % (ref 0–2)
INR PPP: 1.01 (ref 0.84–1.19)
LDLC SERPL CALC-MCNC: 19 MG/DL (ref 0–100)
LYMPHOCYTES # BLD AUTO: 1.54 THOUSANDS/ΜL (ref 0.6–4.47)
LYMPHOCYTES NFR BLD AUTO: 21 % (ref 14–44)
MCH RBC QN AUTO: 30.3 PG (ref 26.8–34.3)
MCHC RBC AUTO-ENTMCNC: 30.9 G/DL (ref 31.4–37.4)
MCV RBC AUTO: 98 FL (ref 82–98)
MONOCYTES # BLD AUTO: 0.71 THOUSAND/ΜL (ref 0.17–1.22)
MONOCYTES NFR BLD AUTO: 10 % (ref 4–12)
NEUTROPHILS # BLD AUTO: 4.74 THOUSANDS/ΜL (ref 1.85–7.62)
NEUTS SEG NFR BLD AUTO: 66 % (ref 43–75)
NONHDLC SERPL-MCNC: 49 MG/DL
NRBC BLD AUTO-RTO: 0 /100 WBCS
PLATELET # BLD AUTO: 136 THOUSANDS/UL (ref 149–390)
PMV BLD AUTO: 10.8 FL (ref 8.9–12.7)
POTASSIUM SERPL-SCNC: 4.5 MMOL/L (ref 3.5–5.3)
PROT SERPL-MCNC: 7.6 G/DL (ref 6.4–8.4)
PROTHROMBIN TIME: 13.5 SECONDS (ref 11.6–14.5)
RBC # BLD AUTO: 3.73 MILLION/UL (ref 3.88–5.62)
SODIUM SERPL-SCNC: 137 MMOL/L (ref 135–147)
TRIGL SERPL-MCNC: 151 MG/DL
WBC # BLD AUTO: 7.23 THOUSAND/UL (ref 4.31–10.16)

## 2022-07-25 PROCEDURE — 36415 COLL VENOUS BLD VENIPUNCTURE: CPT

## 2022-07-25 PROCEDURE — 83036 HEMOGLOBIN GLYCOSYLATED A1C: CPT

## 2022-07-25 PROCEDURE — 85610 PROTHROMBIN TIME: CPT

## 2022-07-25 PROCEDURE — 80061 LIPID PANEL: CPT

## 2022-07-25 PROCEDURE — 80053 COMPREHEN METABOLIC PANEL: CPT

## 2022-07-25 PROCEDURE — 85025 COMPLETE CBC W/AUTO DIFF WBC: CPT

## 2022-07-25 PROCEDURE — 82553 CREATINE MB FRACTION: CPT

## 2022-07-25 PROCEDURE — 93798 PHYS/QHP OP CAR RHAB W/ECG: CPT

## 2022-07-25 PROCEDURE — 82248 BILIRUBIN DIRECT: CPT

## 2022-07-25 PROCEDURE — 82550 ASSAY OF CK (CPK): CPT

## 2022-07-27 ENCOUNTER — CLINICAL SUPPORT (OUTPATIENT)
Dept: CARDIAC REHAB | Facility: CLINIC | Age: 62
End: 2022-07-27
Payer: MEDICARE

## 2022-07-27 DIAGNOSIS — Z98.61 POSTSURGICAL PERCUTANEOUS TRANSLUMINAL CORONARY ANGIOPLASTY STATUS: Primary | ICD-10-CM

## 2022-07-27 PROCEDURE — 93798 PHYS/QHP OP CAR RHAB W/ECG: CPT

## 2022-07-29 ENCOUNTER — APPOINTMENT (OUTPATIENT)
Dept: CARDIAC REHAB | Facility: CLINIC | Age: 62
End: 2022-07-29
Payer: MEDICARE

## 2022-08-01 ENCOUNTER — APPOINTMENT (OUTPATIENT)
Dept: CARDIAC REHAB | Facility: CLINIC | Age: 62
End: 2022-08-01
Payer: MEDICARE

## 2022-08-01 NOTE — PROGRESS NOTES
Cardiac Rehabilitation Plan of Care   30 Day Reassessment          Today's date: 2022   # of Exercise Sessions Completed: 6  Patient name: Efe Haile      : 1960  Age: 58 y o  MRN: [id number]  Referring Physician: Solo Bridges MD  Cardiologist: Solo Bridges MD  Provider: [facility]  Clinician: Shannon Zuniga MS, CEP    Dx:   Encounter Diagnosis   Name Primary?  Postsurgical percutaneous transluminal coronary angioplasty status Yes     Date of onset: 2022      SUMMARY OF PROGRESS:  2022 Megan Sneed has completed 6 exercise sessions since his initial evaluation  He tolerates 30 mins at 2 25 - 2 7 METs  He is tolerating progression of intensity levels to maintain RPE 4-6  Resting BP  112-114/68 with appropriate response to exercise reaching 122-134/70-72  NSR w/ BBB on tele observed  RHR 78 - 82 ExHR 81 - 93  He walks with his spouse one day/week  No cardiac complaints  His current weight is 229 lbs, which is where he weighed in at his initial evaluation  Patient has been working on  dietary modifications with the goal of rare red/processed meats, low fat dairy, reduced added sugars and refined flours  The patient has T2D but does not measure his BG regularly  The patient is a non-smoker  Depression screening using the PHQ-9 and LEYLA-7 were not able to be reassessed today  Questionnaires will be filled out for his next progress note in 30 days  Patient reports excellent social/emotional support  Patient attends group educational classes on cardiac risk factor modification  His exercise program will be progressed as tolerated to maintain RPE 4-6   The patient has the following personal goals he hopes to achieved by discharge which include complete 30-50 minutes of cardiovascular exercise, increase strength and endurance, be less sedentary at home, help spouse with chores around the house, attend cardiac rehab regularly, and walk outdoors with spouse with less rest periods  Pt will continue to be educated on lifestyle modifications and encouraged to supplement with a home exercise program      Completed initial evaluation  Explained cardiac rehabilitation, cardiac risk factors, how the heart functions, heart healthy diet, RPE scale, diabetes and exercise  Completed sub max nu-step test  Obtained BMI and waist measurements  Telemetry monitor NSR with BBB at rest and with exercise  Exercise MET level 2 28  RPE 7  Patient very deconditioned, fatigues easily  Denied any complaint of chest discomfort  Provided him with handbook for cardiac rehabilitation and low sodium recipes  Pt stated goals included:  Increase endurance and strength to be less sedentary at home, stand from a chair with less difficulty, walk outdoors with spouse with less rest periods  He stated he is to have repeat angioplasty on 7/28/2022  He plans to start cardiac rehabilitation on 7/11/2022PHQ9/GAD7 score was 6/6 MD made aware, provided relaxation tips and contact information on [facility] and behavioral health services  He has known history for depression and anxiety and continues to follow up with MD and takes medication as prescribed  His RYP score was 51, he plans to increase his consumption of fruits and vegetables  His initial weight was 229  He received dialysis 3 times a week  Will continue to monitor       Medication compliance: Yes   Comments: Pt reports to be compliant with medications  Fall Risk: Moderate   Comments: Denies a fall in the past 6 months and fatigue and sedentary lifestyle    EKG Interpretation: NSR with BBB      EXERCISE ASSESSMENT and PLAN    Current Exercise Program in Rehab:       Frequency: 3 days/week    Minutes: 30        METS: 2 25-2 70            HR: 81-93   RPE: 6         Modalities: NuStep and Recumbent bike      Exercise Progression 30 Day Goals :    Frequency: 3 days/week of cardiac rehab     Supplement with home exercise 2+ days/wk as tolerated    Minutes: 30-35                            >150 mins/wk of moderate intensity exercise   METS: 2-3 5   HR:     RPE: 4-6   Modalities: NuStep, Recumbent bike and chair to stands    Strength training: Will be added following 2-3 weeks of monitored exercise sessions   Modalities: Sit to Stands and leg curls, leg extentions    Home Exercise: Type: walking with spouse, Frequency: one days/week, frequent rest stops for 1 mile    Goals: Resume ADLs with increased strength, Attend Rehab regularly, Decrease sitting time and Start a walking program    Progression Toward Goals:  Reviewed Pt goals and determined plan of care, Pt is progressing and showing improvement  toward the following goals:  exercise starting to get easier for him   , Patient will exercise 30 minutes in CR on nu-step increase time and resistance as tolerated in the next 30 days, Will continue to educate and progress as tolerated  Education: home exercise guidelines, AHA guidelines to achieve >150 mins/wk of moderate exercise and RPE scale   Plan:home exercise 30+ mins 2 days opposite CR  Readiness to change: Preparation:  (Getting ready to change)       NUTRITION ASSESSMENT AND PLAN    Weight control:    Starting weight: 229   Current weight:   229  Waist circumference:    Startin   Current:      Diabetes: T2D, Patient reported fasting , on Insulin   A1c: 5 5  last measured: 2022    Lipid management: Discussed diet and lipid management and Last lipid profile 2022  Chol 92    HDL 31  LDL 25    Goals:Eat 4-5 cups of fruits and vegetables daily    Progression Toward Goals: Reviewed Pt goals and determined plan of care, Patient will increase consumption of fruits and vegetables in the next 30 days, Will continue to educate and progress as tolerated      Education: heart healthy eating  low sodium diet  hydration  nutrition for  lipid management  exercise and diabetes management   Plan: replace unhealthy snacks with fruits & patricia  Readiness to change: Pre-Contemplation:   (Not yet acknowledging that there is a problem behavior that needs to change)      PSYCHOSOCIAL ASSESSMENT AND PLAN    Emotional:  Depression assessment:  PHQ-9 = 5-9 = Mild Depression            Anxiety measure:  LEYLA-7 = 5-9 = Mild anxiety  Self-reported stress level:  3  Social support: Very Good and Patient reports excellent emotional/social support from family    Goals:  Reduce perceived stress to 1-3/10, improved [facility] QOL < 27, PHQ-9 - reduced severity by one level, continue medical therapy, follow up with therapist, Feelings in [place] Score < 3, Physical Fitness in [place] Score < 3, Daily Activity in [place] Score < 3, Social Activities in [place] Score < 3 and Overall Health in [place] Score < 3    Progression Toward Goals: Reviewed Pt goals and determined plan of care, Patient will continue to f/u with MD and take medication as prescribed, is considering seeing a therapist in addition to MD in the next 30 days, Will continue to educate and progress as tolerated , PHQ-9 and LEYLA-7 questionnaires were not able to be reassessed today  Will reassess for next progress note      Education: signs/sxs of depression, benefits of a positive support system, stress management techniques, depression and CAD and benefits of mental health counseling  Plan: Refer to behavioral health/counseling, PHQ-9 >5 will refer to MD, Refer to [facility] [employer] [facility], Practice relaxation techniques, Exercise, Read a Book and Repeat PHQ-9 every 30 days if score >5  Readiness to change: Preparation:  (Getting ready to change)       OTHER CORE COMPONENTS     Tobacco:   Social History     Tobacco Use   Smoking Status Never Smoker   Smokeless Tobacco Never Used       Tobacco Use Intervention:   N/A:  Patient is a non-smoker     Anginal Symptoms:  None   NTG use: No prescription    Blood pressure:    Restin-114/68    Exercise: 122-134/70-72    Goals: consistent BP < 130/80 and medication compliance    Progression Toward Goals: Reviewed Pt goals and determined plan of care, Pt is progressing and showing improvement  toward the following goals:  consistent BP, medication compliance   , Will continue to educate and progress as tolerated      Education:  understanding high blood pressure and it's relationship to CAD and low sodium diet and HTN  Plan: Class: Understanding Heart Disease, Class: Common Heart Medications, Avoid Processed foods, engage in regular exercise and check labels for sodium content  Readiness to change: Action:  (Changing behavior)

## 2022-08-03 ENCOUNTER — CLINICAL SUPPORT (OUTPATIENT)
Dept: CARDIAC REHAB | Facility: CLINIC | Age: 62
End: 2022-08-03
Payer: MEDICARE

## 2022-08-03 DIAGNOSIS — Z98.61 POSTSURGICAL PERCUTANEOUS TRANSLUMINAL CORONARY ANGIOPLASTY STATUS: Primary | ICD-10-CM

## 2022-08-03 PROCEDURE — 93798 PHYS/QHP OP CAR RHAB W/ECG: CPT

## 2022-08-05 ENCOUNTER — CLINICAL SUPPORT (OUTPATIENT)
Dept: CARDIAC REHAB | Facility: CLINIC | Age: 62
End: 2022-08-05
Payer: MEDICARE

## 2022-08-05 DIAGNOSIS — Z98.61 POSTSURGICAL PERCUTANEOUS TRANSLUMINAL CORONARY ANGIOPLASTY STATUS: Primary | ICD-10-CM

## 2022-08-05 PROCEDURE — 93798 PHYS/QHP OP CAR RHAB W/ECG: CPT

## 2022-08-08 ENCOUNTER — APPOINTMENT (OUTPATIENT)
Dept: CARDIAC REHAB | Facility: CLINIC | Age: 62
End: 2022-08-08
Payer: MEDICARE

## 2022-08-10 ENCOUNTER — CLINICAL SUPPORT (OUTPATIENT)
Dept: CARDIAC REHAB | Facility: CLINIC | Age: 62
End: 2022-08-10
Payer: MEDICARE

## 2022-08-10 DIAGNOSIS — Z98.61 POSTSURGICAL PERCUTANEOUS TRANSLUMINAL CORONARY ANGIOPLASTY STATUS: Primary | ICD-10-CM

## 2022-08-10 PROCEDURE — 93798 PHYS/QHP OP CAR RHAB W/ECG: CPT

## 2022-08-11 ENCOUNTER — HOSPITAL ENCOUNTER (EMERGENCY)
Facility: HOSPITAL | Age: 62
Discharge: HOME | End: 2022-08-11
Attending: EMERGENCY MEDICINE | Admitting: EMERGENCY MEDICINE
Payer: MEDICARE

## 2022-08-11 ENCOUNTER — APPOINTMENT (EMERGENCY)
Dept: RADIOLOGY | Facility: HOSPITAL | Age: 62
End: 2022-08-11
Payer: MEDICARE

## 2022-08-11 VITALS
TEMPERATURE: 99 F | WEIGHT: 225 LBS | HEART RATE: 87 BPM | BODY MASS INDEX: 33.33 KG/M2 | HEIGHT: 69 IN | DIASTOLIC BLOOD PRESSURE: 80 MMHG | SYSTOLIC BLOOD PRESSURE: 139 MMHG | OXYGEN SATURATION: 95 % | RESPIRATION RATE: 18 BRPM

## 2022-08-11 PROCEDURE — 71045 X-RAY EXAM CHEST 1 VIEW: CPT

## 2022-08-12 ENCOUNTER — APPOINTMENT (OUTPATIENT)
Dept: CARDIAC REHAB | Facility: CLINIC | Age: 62
End: 2022-08-12
Payer: MEDICARE

## 2022-08-15 ENCOUNTER — APPOINTMENT (OUTPATIENT)
Dept: CARDIAC REHAB | Facility: CLINIC | Age: 62
End: 2022-08-15
Payer: MEDICARE

## 2022-08-17 ENCOUNTER — APPOINTMENT (OUTPATIENT)
Dept: CARDIAC REHAB | Facility: CLINIC | Age: 62
End: 2022-08-17
Payer: MEDICARE

## 2022-08-19 ENCOUNTER — TELEPHONE (OUTPATIENT)
Dept: CARDIAC REHAB | Facility: CLINIC | Age: 62
End: 2022-08-19

## 2022-08-22 ENCOUNTER — APPOINTMENT (OUTPATIENT)
Dept: CARDIAC REHAB | Facility: CLINIC | Age: 62
End: 2022-08-22
Payer: MEDICARE

## 2022-08-24 ENCOUNTER — APPOINTMENT (OUTPATIENT)
Dept: CARDIAC REHAB | Facility: CLINIC | Age: 62
End: 2022-08-24
Payer: MEDICARE

## 2022-08-26 ENCOUNTER — APPOINTMENT (OUTPATIENT)
Dept: CARDIAC REHAB | Facility: CLINIC | Age: 62
End: 2022-08-26
Payer: MEDICARE

## 2022-08-29 NOTE — PROGRESS NOTES
Cardiac Rehabilitation Plan of Care   60 Day Reassessment          Today's date: 2022   # of Exercise Sessions Completed: 9  Patient name: Shiloh Stanton      : 1960  Age: 58 y o  MRN: [id number]  Referring Physician: Dianna Mcknight MD  Cardiologist: Dianna Mcknight MD  Provider: [place]  Clinician: Alannah Hutchinson MS, CEP    Dx:   Encounter Diagnosis   Name Primary?  Postsurgical percutaneous transluminal coronary angioplasty status Yes     Date of onset: 2022      SUMMARY OF PROGRESS:   2022 Barbie Eng has completed 9 exercise sessions since his initial evaluation  He missed several sessions due to MD appointments and fatigue  He tolerates 30 mins at 2 4 - 2 9 METs  He is tolerating progression of intensity levels to maintain RPE 4-6  Resting -132/68-76 with appropriate response to exercise reaching 122-138/70-84  NSR w/ BBB on tele observed  RHR 78 - 88 ExHR 79 - 93  He walks with his spouse one day/week  No cardiac complaints  His current weight is 229 lbs, which is where he weighed in at his initial evaluation  Patient has been working on  dietary modifications with the goal of rare red/processed meats, low fat dairy, reduced added sugars and refined flours  The patient has T2D but does not measure his BG regularly  Depression screening using the PHQ-9 and LEYLA-7 were not able to be reassessed today  Questionnaires will be filled out for his next progress note in 30 days  Patient reports excellent social/emotional support  The patient has the following personal goals he hopes to achieved by discharge which include complete 30-50 minutes of cardiovascular exercise, increase strength and endurance, be less sedentary at home, help spouse with chores around the house, attend cardiac rehab regularly, and walk outdoors with spouse with less rest periods   Pt will continue to be educated on lifestyle modifications and encouraged to supplement with a home exercise program  8/1/2022 Megan Sneed has completed 6 exercise sessions since his initial evaluation  He tolerates 30 mins at 2 25 - 2 7 METs  He is tolerating progression of intensity levels to maintain RPE 4-6  Resting BP  112-114/68 with appropriate response to exercise reaching 122-134/70-72  NSR w/ BBB on tele observed  RHR 78 - 82 ExHR 81 - 93  He walks with his spouse one day/week  No cardiac complaints  His current weight is 229 lbs, which is where he weighed in at his initial evaluation  Patient has been working on  dietary modifications with the goal of rare red/processed meats, low fat dairy, reduced added sugars and refined flours  The patient has T2D but does not measure his BG regularly  The patient is a non-smoker  Depression screening using the PHQ-9 and LEYLA-7 were not able to be reassessed today  Questionnaires will be filled out for his next progress note in 30 days  Patient reports excellent social/emotional support  Patient attends group educational classes on cardiac risk factor modification  His exercise program will be progressed as tolerated to maintain RPE 4-6  The patient has the following personal goals he hopes to achieved by discharge which include complete 30-50 minutes of cardiovascular exercise, increase strength and endurance, be less sedentary at home, help spouse with chores around the house, attend cardiac rehab regularly, and walk outdoors with spouse with less rest periods  Pt will continue to be educated on lifestyle modifications and encouraged to supplement with a home exercise program      Completed initial evaluation  Explained cardiac rehabilitation, cardiac risk factors, how the heart functions, heart healthy diet, RPE scale, diabetes and exercise  Completed sub max nu-step test  Obtained BMI and waist measurements  Telemetry monitor NSR with BBB at rest and with exercise  Exercise MET level 2 28  RPE 7  Patient very deconditioned, fatigues easily   Denied any complaint of chest discomfort  Provided him with handbook for cardiac rehabilitation and low sodium recipes  Pt stated goals included:  Increase endurance and strength to be less sedentary at home, stand from a chair with less difficulty, walk outdoors with spouse with less rest periods  He stated he is to have repeat angioplasty on 7/28/2022  He plans to start cardiac rehabilitation on 7/11/2022PHQ9/GAD7 score was 6/6 MD made aware, provided relaxation tips and contact information on [facility] and behavioral health services  He has known history for depression and anxiety and continues to follow up with MD and takes medication as prescribed  His RYP score was 51, he plans to increase his consumption of fruits and vegetables  His initial weight was 229  He received dialysis 3 times a week  Will continue to monitor  Medication compliance: Yes   Comments: Pt reports to be compliant with medications  Fall Risk: Moderate   Comments: Denies a fall in the past 6 months and fatigue and sedentary lifestyle    EKG Interpretation: NSR with BBB      EXERCISE ASSESSMENT and PLAN    Current Exercise Program in Rehab:       Frequency: 3 days/week    Minutes: 30        METS: 2 4-2 9           HR: 79-93   RPE: 4-7        Modalities: NuStep and Recumbent bike      Exercise Progression 30 Day Goals :    Frequency: 3 days/week of cardiac rehab     Supplement with home exercise 2+ days/wk as tolerated    Minutes: 30-35                            >150 mins/wk of moderate intensity exercise   METS: 2-4 5   HR:     RPE: 4-6   Modalities: NuStep, Recumbent bike and chair to stands    Strength training:   Will be added following 2-3 weeks of monitored exercise sessions   Modalities: Sit to Stands and leg curls, leg extentions    Home Exercise: Type: walking with spouse, Frequency: one days/week, frequent rest stops for 1 mile    Goals: Resume ADLs with increased strength, Attend Rehab regularly, Decrease sitting time and Start a walking program    Progression Toward Goals:  Reviewed Pt goals and determined plan of care, Pt is progressing and showing improvement  toward the following goals:  exercise starting to get easier for him   , Patient will exercise 30 minutes in CR on nu-step increase time and resistance as tolerated in the next 30 days, Will continue to educate and progress as tolerated  Education: home exercise guidelines, AHA guidelines to achieve >150 mins/wk of moderate exercise and RPE scale   Plan:home exercise 30+ mins 2 days opposite CR  Readiness to change: Preparation:  (Getting ready to change)       NUTRITION ASSESSMENT AND PLAN    Weight control:    Starting weight: 229   Current weight:   229  Waist circumference:    Startin   Current:      Diabetes: T2D, Patient reported fasting , on Insulin   A1c: 5 5  last measured: 2022    Lipid management: Discussed diet and lipid management and Last lipid profile 2022  Chol 92    HDL 31  LDL 25    Goals:Eat 4-5 cups of fruits and vegetables daily    Progression Toward Goals: Reviewed Pt goals and determined plan of care, Patient will increase consumption of fruits and vegetables in the next 30 days, Will continue to educate and progress as tolerated      Education: heart healthy eating  low sodium diet  hydration  nutrition for  lipid management  exercise and diabetes management   Plan: replace unhealthy snacks with fruits & vegs  Readiness to change: Pre-Contemplation:   (Not yet acknowledging that there is a problem behavior that needs to change)      PSYCHOSOCIAL ASSESSMENT AND PLAN    Emotional:  Depression assessment:  PHQ-9 = 5-9 = Mild Depression            Anxiety measure:  LEYLA-7 = 5-9 = Mild anxiety  Self-reported stress level:  3  Social support: Very Good and Patient reports excellent emotional/social support from family    Goals:  Reduce perceived stress to 1-3/10, improved [facility] QOL < 27, PHQ-9 - reduced severity by one level, continue medical therapy, follow up with therapist, Feelings in [place] Score < 3, Physical Fitness in [place] Score < 3, Daily Activity in [place] Score < 3, Social Activities in [place] Score < 3 and [facility] < 3    Progression Toward Goals: Reviewed Pt goals and determined plan of care, Patient will continue to f/u with MD and take medication as prescribed, is considering seeing a therapist in addition to MD in the next 30 days, Will continue to educate and progress as tolerated , PHQ-9 and LEYLA-7 questionnaires were not able to be reassessed today  Will reassess for next progress note  Education: signs/sxs of depression, benefits of a positive support system, stress management techniques, depression and CAD and benefits of mental health counseling  Plan: Refer to behavioral health/counseling, PHQ-9 >5 will refer to MD, Refer to [facility], Practice relaxation techniques, Exercise, Read a Book and Repeat PHQ-9 every 30 days if score >5  Readiness to change: Preparation:  (Getting ready to change)       OTHER CORE COMPONENTS     Tobacco:   Social History     Tobacco Use   Smoking Status Never Smoker   Smokeless Tobacco Never Used       Tobacco Use Intervention:   N/A:  Patient is a non-smoker     Anginal Symptoms:  None   NTG use: No prescription    Blood pressure:    Restin-132/68-76    Exercise: 122-138/70-84    Goals: consistent BP < 130/80 and medication compliance    Progression Toward Goals: Reviewed Pt goals and determined plan of care, Pt is progressing and showing improvement  toward the following goals:  consistent BP, medication compliance   , Will continue to educate and progress as tolerated      Education:  understanding high blood pressure and it's relationship to CAD and low sodium diet and HTN  Plan: Class: Understanding Heart Disease, Class: Common Heart Medications, Avoid Processed foods, engage in regular exercise and check labels for sodium content  Readiness to change: Action:  (Changing behavior)

## 2022-08-30 ENCOUNTER — TELEPHONE (OUTPATIENT)
Dept: PSYCHIATRY | Facility: CLINIC | Age: 62
End: 2022-08-30

## 2022-08-30 ENCOUNTER — TELEPHONE (OUTPATIENT)
Dept: INTERNAL MEDICINE CLINIC | Facility: CLINIC | Age: 62
End: 2022-08-30

## 2022-08-30 NOTE — TELEPHONE ENCOUNTER
contacted patient using interpretive services Thiago Connolly, [phone]) in regards to referral and in attempts to add patient to proper waitlist  lvm for patient to contact intake dept

## 2022-09-03 ENCOUNTER — HOSPITAL ENCOUNTER (EMERGENCY)
Facility: HOSPITAL | Age: 62
Discharge: HOME | End: 2022-09-03
Attending: EMERGENCY MEDICINE
Payer: MEDICARE

## 2022-09-03 VITALS
RESPIRATION RATE: 20 BRPM | DIASTOLIC BLOOD PRESSURE: 72 MMHG | SYSTOLIC BLOOD PRESSURE: 169 MMHG | HEART RATE: 79 BPM | TEMPERATURE: 98.2 F | OXYGEN SATURATION: 96 %

## 2022-09-03 DIAGNOSIS — Z99.2 ESRD ON DIALYSIS (HCC): ICD-10-CM

## 2022-09-03 DIAGNOSIS — N17.9 ACUTE KIDNEY INJURY SUPERIMPOSED ON CHRONIC KIDNEY DISEASE (HCC): ICD-10-CM

## 2022-09-03 DIAGNOSIS — N18.6 ESRD ON DIALYSIS (HCC): ICD-10-CM

## 2022-09-03 DIAGNOSIS — N18.6 ESRD (END STAGE RENAL DISEASE) (HCC): Primary | ICD-10-CM

## 2022-09-03 DIAGNOSIS — N18.9 ACUTE KIDNEY INJURY SUPERIMPOSED ON CHRONIC KIDNEY DISEASE (HCC): ICD-10-CM

## 2022-09-03 LAB
ANION GAP SERPL CALCULATED.3IONS-SCNC: 12 MMOL/L (ref 4–13)
BUN SERPL-MCNC: 48 MG/DL (ref 5–25)
CALCIUM SERPL-MCNC: 9.1 MG/DL (ref 8.4–10.2)
CHLORIDE SERPL-SCNC: 96 MMOL/L (ref 96–108)
CO2 SERPL-SCNC: 28 MMOL/L (ref 21–32)
CREAT SERPL-MCNC: 6.67 MG/DL (ref 0.6–1.3)
GFR SERPL CREATININE-BSD FRML MDRD: 8 ML/MIN/1.73SQ M
GLUCOSE SERPL-MCNC: 209 MG/DL (ref 65–140)
POTASSIUM SERPL-SCNC: 4.4 MMOL/L (ref 3.5–5.3)
SODIUM SERPL-SCNC: 136 MMOL/L (ref 135–147)

## 2022-09-03 PROCEDURE — 99284 EMERGENCY DEPT VISIT MOD MDM: CPT | Performed by: EMERGENCY MEDICINE

## 2022-09-03 PROCEDURE — 36415 COLL VENOUS BLD VENIPUNCTURE: CPT | Performed by: EMERGENCY MEDICINE

## 2022-09-03 PROCEDURE — 80048 BASIC METABOLIC PNL TOTAL CA: CPT | Performed by: EMERGENCY MEDICINE

## 2022-09-03 PROCEDURE — 99283 EMERGENCY DEPT VISIT LOW MDM: CPT

## 2022-09-03 RX ORDER — TORSEMIDE 100 MG/1
100 TABLET ORAL DAILY
Qty: 2 TABLET | Refills: 0 | Status: SHIPPED | OUTPATIENT
Start: 2022-09-03 | End: 2022-09-05

## 2022-09-03 NOTE — DISCHARGE INSTRUCTIONS
Diagnosis: end stage renal disease on dialysis    - today  and tomorrow- torsemide a water pill-- 100 mg once a  day     - if on Monday you get into trouble  with increasing shortness of breath  - please return to  the er

## 2022-09-04 NOTE — ED PROVIDER NOTES
History  Chief Complaint   Patient presents with    Medical Problem     Pt reports getting dialysis today but only for 1 hr because he would not sit still and normally gets it approx 4/4 5 hrs, here for rest of dialysis treatment, pt denies complaints     62 YR MALE WITH ESRD ON HD--  TODAY AT HD-- COULD NOT SIT STILL WHICH HAS HAPPEN BEFORE AND THE STOPPED HCD AFTER 1 1/2 HRS-  PT STILL 6 LBS FROM DRY WEIGHT AS PER WIFE THRU HD NURSE -- SENT TO ER FOR EVAL-- PT HAS NO COMPLAINST AT PRESENT       History provided by:  Patient and spouse   used: No    Medical Problem      Prior to Admission Medications   Prescriptions Last Dose Informant Patient Reported? Taking? BD Pen Needle Adina U/F 32G X 4 MM MISC  Spouse/Significant Other No No   Sig: USE TO INJECT INSULIN 4 TIMES DAILY   Blood Glucose Monitoring Suppl (True Metrix Meter) w/Device KIT   No No   Sig: Use to test blood sugars 3 times daily   Blood Pressure Monitoring (BLOOD PRESSURE CUFF) MISC  Spouse/Significant Other No No   Sig: Use to check blood pressure before taking blood pressure medication and 1 hour after and follow instructions provided in discharge instructions based on the readings     Cholecalciferol (Vitamin D3) 1 25 MG (33214 UT) CAPS   No No   Sig: TAKE 1 CAPSULE BY MOUTH ONE TIME PER WEEK   Cinacalcet HCl (SENSIPAR PO)   Yes No   Sig: Take 60 mg by mouth 3 (three) times a week   Insulin Syringe-Needle U-100 (B-D INS SYRINGE 0 5CC/30GX1/2") 30G X 1/2" 0 5 ML MISC   No No   Sig: Inject once daily   Lancets Ultra Thin 30G MISC  Spouse/Significant Other No No   Sig: Use 3 times a day   Lantus 100 UNIT/ML subcutaneous injection   No No   Sig: INJECT 14 UNITS UNDER THE SKIN DAILY   ONETOUCH DELICA LANCETS 70Q MISC  Spouse/Significant Other No No   Sig: by Does not apply route 3 (three) times a day   Semaglutide,0 25 or 0 5MG/DOS, (Ozempic, 0 25 or 0 5 MG/DOSE,) 2 MG/1 5ML SOPN   No No   Sig: Inject 0 25 mg once a week   TORSEMIDE PO  Spouse/Significant Other Yes No   Sig: Take 50 mg by mouth Pt takes 50 mg daily on non- dialysis days: sat, sun, Tues, and thrusday  Pt takes 10 mg of torsemide daily on dialysis days m- w- f     aspirin (ECOTRIN LOW STRENGTH) 81 mg EC tablet  Spouse/Significant Other Yes No   Sig: Take 81 mg by mouth daily Resume on 8/14     atorvastatin (LIPITOR) 40 mg tablet   No No   Sig: TAKE 1 TABLET BY MOUTH DAILY WITH DINNER   calcium acetate (CALPHRON) 667 mg  Spouse/Significant Other Yes No   Sig: TAKE 2 TABLETS BY MOUTH THREE TIMES DAILY WITH MEALS   carvedilol (COREG) 12 5 mg tablet   No No   Sig: Take 1 tablet (12 5 mg total) by mouth 2 (two) times a day with meals   clopidogrel (PLAVIX) 75 mg tablet   No No   Sig: Take 1 tablet (75 mg total) by mouth in the morning     glucose blood (True Metrix Blood Glucose Test) test strip   No No   Sig: Use 1 each 3 (three) times a day Use as instructed   insulin lispro (HumaLOG KwikPen) 100 units/mL injection pen  Spouse/Significant Other No No   Sig: INJECT 4 UNITS 3 TIMES A DAY WITH MEALS PLUS SCALE (max daily dose 42 units)   isosorbide mononitrate (IMDUR) 30 mg 24 hr tablet   No No   Sig: Take 1 tablet (30 mg total) by mouth daily   sertraline (ZOLOFT) 25 mg tablet   No No   Sig: Take 1 tablet (25 mg total) by mouth daily at bedtime   torsemide (DEMADEX) 100 mg tablet   Yes No   Sig: Take 50 mg by mouth daily      Facility-Administered Medications: None       Past Medical History:   Diagnosis Date    Cerebrovascular accident (CVA) due to thrombosis of left middle cerebral artery ([facility] ) 7/29/2018    Chronic kidney disease     Diabetes mellitus [facility]     GERD (gastroesophageal reflux disease)     Hypercholesteremia     Hyperlipidemia     Hypertension     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    Neuropathy     Obesity     Osteomyelitis ([facility] )     last assessed 11/4/16    PVC's (premature ventricular contractions)     sees cardiology Dr Lyric camargo    Stroke [facility])     last weeof July 2018 [address] [facility]    TIA (transient ischemic attack) 10/28/2018       Past Surgical History:   Procedure Laterality Date    ABDOMINAL SURGERY      CARDIAC CATHETERIZATION N/A 5/2/2022    Procedure: Cardiac Coronary Angiogram;  Surgeon: Hai Norman MD;  [facility] C[facility]; Service: Cardiology    CARDIAC CATHETERIZATION N/A 5/2/2022    Procedure: Cardiac pci;  Surgeon: Hai Norman MD;  [facility]; Service: Cardiology    CHOLECYSTECTOMY      Percutaneous    COLONOSCOPY      CYSTOSCOPY      OTHER SURGICAL HISTORY      "stimulator to control bowel movements"    AR ESOPHAGOGASTRODUODENOSCOPY TRANSORAL DIAGNOSTIC N/A 9/27/2016    Procedure: ESOPHAGOGASTRODUODENOSCOPY (EGD); Surgeon: Theodora Jacques MD;  Location: [facility] GI LAB; Service: Gastroenterology    AR LAP,CHOLECYSTECTOMY N/A 2/29/2016    Procedure: LAPAROSCOPIC CHOLECYSTECTOMY ;  Surgeon: Joanie Randle DO;  Location: [facility] Main OR;  Service: General    ROTATOR CUFF REPAIR Right     TOE AMPUTATION Right 10/28/2016    Procedure: 3RD TOE AMPUTATION ;  Surgeon: Juan Couch DPM;  Location: [facility] Main OR;  Service:        Family History   Problem Relation Age of Onset    Leukemia Mother     Liver disease Mother     Lung cancer Mother         heavy smoker - 3 ppd    Heart disease Father     Liver disease Father     Multiple myeloma Sister     Breast cancer Sister     Urolithiasis Family     Alcohol abuse Neg Hx     Depression Neg Hx     Drug abuse Neg Hx     Substance Abuse Neg Hx     Mental illness Neg Hx      I have reviewed and agree with the history as documented      E-Cigarette/Vaping    E-Cigarette Use Never User      E-Cigarette/Vaping Substances    Nicotine No     THC No     CBD No     Flavoring No     Other No     Unknown No      Social History     Tobacco Use    Smoking status: Never Smoker    Smokeless tobacco: Never Used   Vaping Use    Vaping Use: Never used   Substance Use Topics    Alcohol use: Not Currently    Drug use: No       Review of Systems   Constitutional: Negative  HENT: Negative  Eyes: Negative  Respiratory: Negative  Cardiovascular: Negative  Gastrointestinal: Negative  Endocrine: Negative  Genitourinary: Negative  Musculoskeletal: Negative  Skin: Negative  Allergic/Immunologic: Negative  Neurological: Negative  Hematological: Negative  Psychiatric/Behavioral: Negative  Physical Exam  Physical Exam  Vitals and nursing note reviewed  Constitutional:       General: He is not in acute distress  Appearance: Normal appearance  He is not ill-appearing, toxic-appearing or diaphoretic  Comments: AVSS-- PULSE OX 96 % ON RA- INTERPRETATION IS NORMAL- NO INTERVENTION - IN NAD    HENT:      Head: Normocephalic and atraumatic  Nose: Nose normal       Mouth/Throat:      Mouth: Mucous membranes are moist    Eyes:      General: No scleral icterus  Right eye: No discharge  Left eye: No discharge  Extraocular Movements: Extraocular movements intact  Conjunctiva/sclera: Conjunctivae normal       Pupils: Pupils are equal, round, and reactive to light  Comments: MM PINK   Neck:      Vascular: No carotid bruit  Comments: NO PMT C/T/L/S SPINE   Cardiovascular:      Rate and Rhythm: Normal rate and regular rhythm  Heart sounds: Murmur heard  No friction rub  No gallop  Pulmonary:      Effort: Pulmonary effort is normal  No respiratory distress  Breath sounds: No stridor  Rales present  No wheezing or rhonchi  Comments: BIBASILAR CRACKLES   Chest:      Chest wall: No tenderness  Abdominal:      General: Bowel sounds are normal  There is no distension  Palpations: Abdomen is soft  There is no mass  Tenderness: There is no abdominal tenderness  There is no right CVA tenderness, left CVA tenderness, guarding or rebound        Hernia: No hernia is present  Comments: SOFT NT/ND- NO HSM- NO CVA TENDERNESS- NO ASCITES- NO PERITONEAL SIGNS- NO PULSATILE ABD MASS/RUIT/TENDERNESS   Musculoskeletal:         General: No swelling, tenderness, deformity or signs of injury  Normal range of motion  Cervical back: Normal range of motion and neck supple  No rigidity or tenderness  Right lower leg: Edema present  Left lower leg: Edema present  Comments: LUE AV FISTULA- POS BRUIT/ THRILL-- TRACE BEL PRETIBIAL EDEMA- NT- NO ASYM/ ERYTHEMA   Lymphadenopathy:      Cervical: No cervical adenopathy  Skin:     General: Skin is warm  Capillary Refill: Capillary refill takes less than 2 seconds  Coloration: Skin is not jaundiced or pale  Findings: No bruising, erythema, lesion or rash  Neurological:      General: No focal deficit present  Mental Status: He is alert and oriented to person, place, and time  Mental status is at baseline  Cranial Nerves: No cranial nerve deficit  Sensory: No sensory deficit  Motor: No weakness        Coordination: Coordination normal       Gait: Gait normal       Comments: NORMAL NON FOCAL NEURO EXAM    Psychiatric:         Mood and Affect: Mood normal          Vital Signs  ED Triage Vitals [09/03/22 1331]   Temperature Pulse Respirations Blood Pressure SpO2   98 2 °F (36 8 °C) 79 20 169/72 96 %      Temp Source Heart Rate Source Patient Position - Orthostatic VS BP Location FiO2 (%)   Oral Monitor Sitting Right arm --      Pain Score       --           Vitals:    09/03/22 1331   BP: 169/72   Pulse: 79   Patient Position - Orthostatic VS: Sitting         Visual Acuity      ED Medications  Medications - No data to display    Diagnostic Studies  Results Reviewed     Procedure Component Value Units Date/Time    Basic metabolic panel [[id number]]  (Abnormal) Collected: 09/03/22 1454    Lab Status: Final result Specimen: Blood from Arm, Right Updated: 09/03/22 1528     Sodium 136 mmol/L Potassium 4 4 mmol/L      Chloride 96 mmol/L      CO2 28 mmol/L      ANION GAP 12 mmol/L      BUN 48 mg/dL      Creatinine 6 67 mg/dL      Glucose 209 mg/dL      Calcium 9 1 mg/dL      eGFR 8 ml/min/1 73sq m     Narrative:      National Kidney Disease Foundation guidelines for Chronic Kidney Disease (CKD):     Stage 1 with normal or high GFR (GFR > 90 mL/min/1 73 square meters)    Stage 2 Mild CKD (GFR = 60-89 mL/min/1 73 square meters)    Stage 3A Moderate CKD (GFR = 45-59 mL/min/1 73 square meters)    Stage 3B Moderate CKD (GFR = 30-44 mL/min/1 73 square meters)    Stage 4 Severe CKD (GFR = 15-29 mL/min/1 73 square meters)    Stage 5 End Stage CKD (GFR <15 mL/min/1 73 square meters)  Note: GFR calculation is accurate only with a steady state creatinine                 No orders to display              Procedures  Procedures         ED Course  ED Course as of 09/04/22 1110   Sat Sep 03, 2022   1506 ER MD NOTE- PT DID TAKE ALL OF HIS MEDICATIONS TODAY UP UNTIL NOW    1506 ER MD NOTE- WIFE STATES WAS TOLD BY HD NURSE THAT PT IS 6 LBS OVER DRY WEIGHT - ONLY HAD 1 1/2 HR SESSION TODAY - PT AND WIFE AWARE OF PLAN AS PT IS IN NAD WITH NO COMPLAINTS- RESP STABLE- TO CHECK BMP AND DISCUSS WITH ON CALL NEPHROLOGY    200 Er md note-  talked to on call nephrology - dr Dina Ludwig-- if pt makes urine would give him torsemide 100 mg a day for next 2 days-- and come back on Monday if he gets into trouble- this explained to wife how verbalizes understanding                                              MDM    Disposition  Final diagnoses:   ESRD (end stage renal disease) ([facility] )   Acute kidney [facility] superimposed on chronic kidney disease ([facility] )   ESRD on dialysis [facility])     Time reflects when diagnosis was documented in both MDM as applicable and the Disposition within this note     Time User Action Codes Description Comment    9/3/2022  4:15 PM Abelardo Sicard D Add [N18 6] ESRD (end stage renal disease) ([facility] )     9/3/2022  4:17 PM Everett Jacques Add [N17 9,  N18 9] Acute kidney injury superimposed on chronic kidney disease ([address] )     9/3/2022  4:17 PM Everett Jacques Add [N18 6,  Z99 2] ESRD on dialysis [facility])       ED Disposition     ED Disposition   Discharge    Condition   Stable    Date/Time   Sat Sep 3, 2022  4:15 PM    Comment   [facility] discharge to home/self care  Follow-up Information    None         Discharge Medication List as of 9/3/2022  4:17 PM      START taking these medications    Details   !! torsemide (DEMADEX) 100 mg tablet Take 1 tablet (100 mg total) by mouth daily for 2 doses, Starting Sat 9/3/2022, Until Mon 9/5/2022, Print       !! - Potential duplicate medications found  Please discuss with provider  CONTINUE these medications which have NOT CHANGED    Details   aspirin (ECOTRIN LOW STRENGTH) 81 mg EC tablet Take 81 mg by mouth daily Resume on 8/14  , Historical Med      atorvastatin (LIPITOR) 40 mg tablet TAKE 1 TABLET BY MOUTH DAILY WITH DINNER, Normal      BD Pen Needle Adina U/F 32G X 4 MM MISC USE TO INJECT INSULIN 4 TIMES DAILY, Normal      Blood Glucose Monitoring Suppl (True Metrix Meter) w/Device KIT Use to test blood sugars 3 times daily, Normal      Blood Pressure Monitoring (BLOOD PRESSURE CUFF) MISC Use to check blood pressure before taking blood pressure medication and 1 hour after and follow instructions provided in discharge instructions based on the readings  , Print      calcium acetate (CALPHRON) 667 mg TAKE 2 TABLETS BY MOUTH THREE TIMES DAILY WITH MEALS, Historical Med      carvedilol (COREG) 12 5 mg tablet Take 1 tablet (12 5 mg total) by mouth 2 (two) times a day with meals, Starting Wed 6/15/2022, Until Fri 7/15/2022, Sample      Cholecalciferol (Vitamin D3) 1 25 MG (87091 UT) CAPS TAKE 1 CAPSULE BY MOUTH ONE TIME PER WEEK, Normal      Cinacalcet HCl (SENSIPAR PO) Take 60 mg by mouth 3 (three) times a week, Starting Mon 6/20/2022, Until Mon 6/19/2023, Historical Med      clopidogrel (PLAVIX) 75 mg tablet Take 1 tablet (75 mg total) by mouth in the morning , Starting Mon 5/23/2022, Normal      glucose blood (True Metrix Blood Glucose Test) test strip Use 1 each 3 (three) times a day Use as instructed, Starting Fri 7/1/2022, Normal      insulin lispro (HumaLOG KwikPen) 100 units/mL injection pen INJECT 4 UNITS 3 TIMES A DAY WITH MEALS PLUS SCALE (max daily dose 42 units), Normal      Insulin Syringe-Needle U-100 (B-D INS SYRINGE 0 5CC/30GX1/2") 30G X 1/2" 0 5 ML MISC Inject once daily, Normal      isosorbide mononitrate (IMDUR) 30 mg 24 hr tablet Take 1 tablet (30 mg total) by mouth daily, Starting Sun 6/12/2022, Until Thu 7/14/2022, Normal      !! Lancets Ultra Thin 30G MISC Use 3 times a day, Normal      Lantus 100 UNIT/ML subcutaneous injection INJECT 14 UNITS UNDER THE SKIN DAILY, Starting Mon 8/8/2022, Normal      !! ONETOUCH DELICA LANCETS 59A MISC by Does not apply route 3 (three) times a day, Starting Tue 11/27/2018, Normal      Semaglutide,0 25 or 0 5MG/DOS, (Ozempic, 0 25 or 0 5 MG/DOSE,) 2 MG/1 5ML SOPN Inject 0 25 mg once a week, Normal      sertraline (ZOLOFT) 25 mg tablet Take 1 tablet (25 mg total) by mouth daily at bedtime, Starting Thu 6/16/2022, Normal      !! torsemide (DEMADEX) 100 mg tablet Take 50 mg by mouth daily, Starting Tue 7/19/2022, Historical Med      !! TORSEMIDE PO Take 50 mg by mouth Pt takes 50 mg daily on non- dialysis days: sat, sun, Tues, and thrusday  Pt takes 10 mg of torsemide daily on dialysis days m- w- f  , Starting Mon 10/25/2021, Historical Med       !! - Potential duplicate medications found  Please discuss with provider  No discharge procedures on file      PDMP Review       Value Time User    PDMP Reviewed  Yes 7/14/2022  3:15 AM Faviola Nieves MD          ED Provider  Electronically Signed by           Kristine Cade MD  09/04/22 1809

## 2022-09-14 NOTE — PROGRESS NOTES
Cardiac Rehabilitation Plan of Care   Discharge          Today's date: 2022   # of Exercise Sessions Completed: 9  Patient name: Stefanie Cordero      : 1960  Age: 58 y o  MRN: [id number]  Referring Physician: Emre Flores MD  Cardiologist: Emre Flores MD  Provider: Eneida Wheeler  Clinician: Valentina Roblero RN    Dx:   Encounter Diagnosis   Name Primary?  Postsurgical percutaneous transluminal coronary angioplasty status Yes     Date of onset: 2022      SUMMARY OF PROGRESS:   2022 Jazlyn Yadav has been discharged from the cardiac rehabilitation program  Unable to complete outcome evaluation due to patient has not returned to cardiac rehabilitation since session 9 on 8/10/22  Spouse stated via phone today that he will not be returning to cardiac rehabilitation, he is walking at home and plans to purchase a bike for home  No change to previous note except patient has been discharged  He will f/u with cardiologist      Familia Jose has completed 9 exercise sessions since his initial evaluation  He missed several sessions due to MD appointments and fatigue  He tolerates 30 mins at 2 4 - 2 9 METs  He is tolerating progression of intensity levels to maintain RPE 4-6  Resting -132/68-76 with appropriate response to exercise reaching 122-138/70-84  NSR w/ BBB on tele observed  RHR 78 - 88 ExHR 79 - 93  He walks with his spouse one day/week  No cardiac complaints  His current weight is 229 lbs, which is where he weighed in at his initial evaluation  Patient has been working on  dietary modifications with the goal of rare red/processed meats, low fat dairy, reduced added sugars and refined flours  The patient has T2D but does not measure his BG regularly  Depression screening using the PHQ-9 and LEYLA-7 were not able to be reassessed today  Questionnaires will be filled out for his next progress note in 30 days  Patient reports excellent social/emotional support   The patient has the following personal goals he hopes to achieved by discharge which include complete 30-50 minutes of cardiovascular exercise, increase strength and endurance, be less sedentary at home, help spouse with chores around the house, attend cardiac rehab regularly, and walk outdoors with spouse with less rest periods  Pt will continue to be educated on lifestyle modifications and encouraged to supplement with a home exercise program       8/1/2022 Yessi has completed 6 exercise sessions since his initial evaluation  He tolerates 30 mins at 2 25 - 2 7 METs  He is tolerating progression of intensity levels to maintain RPE 4-6  Resting BP  112-114/68 with appropriate response to exercise reaching 122-134/70-72  NSR w/ BBB on tele observed  RHR 78 - 82 ExHR 81 - 93  He walks with his spouse one day/week  No cardiac complaints  His current weight is 229 lbs, which is where he weighed in at his initial evaluation  Patient has been working on  dietary modifications with the goal of rare red/processed meats, low fat dairy, reduced added sugars and refined flours  The patient has T2D but does not measure his BG regularly  The patient is a non-smoker  Depression screening using the PHQ-9 and LEYLA-7 were not able to be reassessed today  Questionnaires will be filled out for his next progress note in 30 days  Patient reports excellent social/emotional support  Patient attends group educational classes on cardiac risk factor modification  His exercise program will be progressed as tolerated to maintain RPE 4-6  The patient has the following personal goals he hopes to achieved by discharge which include complete 30-50 minutes of cardiovascular exercise, increase strength and endurance, be less sedentary at home, help spouse with chores around the house, attend cardiac rehab regularly, and walk outdoors with spouse with less rest periods   Pt will continue to be educated on lifestyle modifications and encouraged to supplement with a home exercise program      Completed initial evaluation  Explained cardiac rehabilitation, cardiac risk factors, how the heart functions, heart healthy diet, RPE scale, diabetes and exercise  Completed sub max nu-step test  Obtained BMI and waist measurements  Telemetry monitor NSR with BBB at rest and with exercise  Exercise MET level 2 28  RPE 7  Patient very deconditioned, fatigues easily  Denied any complaint of chest discomfort  Provided him with handbook for cardiac rehabilitation and low sodium recipes  Pt stated goals included:  Increase endurance and strength to be less sedentary at home, stand from a chair with less difficulty, walk outdoors with spouse with less rest periods  He stated he is to have repeat angioplasty on 7/28/2022  He plans to start cardiac rehabilitation on 7/11/2022PHQ9/GAD7 score was 6/6 MD made aware, provided relaxation tips and contact information on [facility] and behavioral health services  He has known history for depression and anxiety and continues to follow up with MD and takes medication as prescribed  His RYP score was 51, he plans to increase his consumption of fruits and vegetables  His initial weight was 229  He received dialysis 3 times a week  Will continue to monitor       Medication compliance: Yes   Comments: Pt reports to be compliant with medications  Fall Risk: Moderate   Comments: Denies a fall in the past 6 months and fatigue and sedentary lifestyle    EKG Interpretation: NSR with BBB      EXERCISE ASSESSMENT and PLAN    Current Exercise Program in Rehab:       Frequency: 3 days/week    Minutes: 30        METS: 2 4-2 9           HR: 79-93   RPE: 4-7        Modalities: NuStep and Recumbent bike      Exercise Progression 30 Day Goals :    Frequency: 3 days/week of cardiac rehab     Supplement with home exercise 2+ days/wk as tolerated    Minutes: 30-35                            >150 mins/wk of moderate intensity exercise   METS: 2-4 5   HR:     RPE: 4-6   Modalities: NuStep, Recumbent bike and chair to stands    Strength training: Will be added following 2-3 weeks of monitored exercise sessions   Modalities: Sit to Stands and leg curls, leg extentions    Home Exercise: Type: walking with spouse, Frequency: one days/week, frequent rest stops for 1 mile    Goals: Resume ADLs with increased strength, Attend Rehab regularly, Decrease sitting time and Start a walking program    Progression Toward Goals:  Reviewed Pt goals and determined plan of care, Pt is progressing and showing improvement  toward the following goals:  exercise starting to get easier for him   , Patient will exercise 30 minutes in CR on nu-step increase time and resistance as tolerated in the next 30 days, Will continue to educate and progress as tolerated  Education: home exercise guidelines, AHA guidelines to achieve >150 mins/wk of moderate exercise and RPE scale   Plan:home exercise 30+ mins 2 days opposite CR  Readiness to change: Preparation:  (Getting ready to change)       NUTRITION ASSESSMENT AND PLAN    Weight control:    Starting weight: 229   Current weight:   229  Waist circumference:    Startin   Current:      Diabetes: T2D, Patient reported fasting , on Insulin   A1c: 5 5  last measured: 2022    Lipid management: Discussed diet and lipid management and Last lipid profile 2022  Chol 92    HDL 31  LDL 25    Goals:Eat 4-5 cups of fruits and vegetables daily    Progression Toward Goals: Reviewed Pt goals and determined plan of care, Patient will increase consumption of fruits and vegetables in the next 30 days, Will continue to educate and progress as tolerated      Education: heart healthy eating  low sodium diet  hydration  nutrition for  lipid management  exercise and diabetes management   Plan: replace unhealthy snacks with fruits & vegs  Readiness to change: Pre-Contemplation:   (Not yet acknowledging that there is a problem behavior that needs to change)      PSYCHOSOCIAL ASSESSMENT AND PLAN    Emotional:  Depression assessment:  PHQ-9 = 5-9 = Mild Depression            Anxiety measure:  LEYLA-7 = 5-9 = Mild anxiety  Self-reported stress level:  3  Social support: Very Good and Patient reports excellent emotional/social support from family    Goals:  Reduce perceived stress to 1-3/10, improved [facility] QOL < 27, PHQ-9 - reduced severity by one level, continue medical therapy, follow up with therapist, Feelings in [place] Score < 3, Physical Fitness in [place] Score < 3, Daily Activity in [place] Score < 3, Social Activities in [place] Score < 3 and Overall Health in [place] Score < 3    Progression Toward Goals: Reviewed Pt goals and determined plan of care, Patient will continue to f/u with MD and take medication as prescribed, is considering seeing a therapist in addition to MD in the next 30 days, Will continue to educate and progress as tolerated , PHQ-9 and LEYLA-7 questionnaires were not able to be reassessed today  Will reassess for next progress note      Education: signs/sxs of depression, benefits of a positive support system, stress management techniques, depression and CAD and benefits of mental health counseling  Plan: Refer to behavioral health/counseling, PHQ-9 >5 will refer to MD, Refer to [employer], Practice relaxation techniques, Exercise, Read a Book and Repeat PHQ-9 every 30 days if score >5  Readiness to change: Preparation:  (Getting ready to change)       OTHER CORE COMPONENTS     Tobacco:   Social History     Tobacco Use   Smoking Status Never Smoker   Smokeless Tobacco Never Used       Tobacco Use Intervention:   N/A:  Patient is a non-smoker     Anginal Symptoms:  None   NTG use: No prescription    Blood pressure:    Restin-132/68-76    Exercise: 122-138/70-84    Goals: consistent BP < 130/80 and medication compliance    Progression Toward Goals: Reviewed Pt goals and determined plan of care, Pt is progressing and showing improvement  toward the following goals:  consistent BP, medication compliance   , Will continue to educate and progress as tolerated      Education:  understanding high blood pressure and it's relationship to CAD and low sodium diet and HTN  Plan: Class: Understanding Heart Disease, Class: Common Heart Medications, Avoid Processed foods, engage in regular exercise and check labels for sodium content  Readiness to change: Action:  (Changing behavior)

## 2022-09-16 RX ORDER — ISOSORBIDE MONONITRATE 30 MG/1
TABLET, EXTENDED RELEASE ORAL
Qty: 30 TABLET | Refills: 0 | OUTPATIENT
Start: 2022-09-16

## 2022-09-22 ENCOUNTER — APPOINTMENT (EMERGENCY)
Dept: CT IMAGING | Facility: HOSPITAL | Age: 62
End: 2022-09-22
Payer: MEDICARE

## 2022-09-22 ENCOUNTER — HOSPITAL ENCOUNTER (EMERGENCY)
Facility: HOSPITAL | Age: 62
Discharge: HOME | End: 2022-09-22
Attending: EMERGENCY MEDICINE
Payer: MEDICARE

## 2022-09-22 VITALS
OXYGEN SATURATION: 94 % | HEART RATE: 86 BPM | TEMPERATURE: 98 F | SYSTOLIC BLOOD PRESSURE: 133 MMHG | DIASTOLIC BLOOD PRESSURE: 60 MMHG | RESPIRATION RATE: 18 BRPM

## 2022-09-22 DIAGNOSIS — R42 DIZZINESS: Primary | ICD-10-CM

## 2022-09-22 LAB
ALBUMIN SERPL BCP-MCNC: 4.4 G/DL (ref 3.5–5)
ALP SERPL-CCNC: 93 U/L (ref 34–104)
ALT SERPL W P-5'-P-CCNC: 15 U/L (ref 7–52)
ANION GAP SERPL CALCULATED.3IONS-SCNC: 10 MMOL/L (ref 4–13)
AST SERPL W P-5'-P-CCNC: 15 U/L (ref 13–39)
BASOPHILS # BLD AUTO: 0.03 THOUSANDS/ΜL (ref 0–0.1)
BASOPHILS NFR BLD AUTO: 0 % (ref 0–1)
BILIRUB SERPL-MCNC: 0.47 MG/DL (ref 0.2–1)
BUN SERPL-MCNC: 26 MG/DL (ref 5–25)
CALCIUM SERPL-MCNC: 9.8 MG/DL (ref 8.4–10.2)
CHLORIDE SERPL-SCNC: 93 MMOL/L (ref 96–108)
CO2 SERPL-SCNC: 36 MMOL/L (ref 21–32)
CREAT SERPL-MCNC: 5.29 MG/DL (ref 0.6–1.3)
EOSINOPHIL # BLD AUTO: 0.1 THOUSAND/ΜL (ref 0–0.61)
EOSINOPHIL NFR BLD AUTO: 2 % (ref 0–6)
ERYTHROCYTE [DISTWIDTH] IN BLOOD BY AUTOMATED COUNT: 14.9 % (ref 11.6–15.1)
GFR SERPL CREATININE-BSD FRML MDRD: 10 ML/MIN/1.73SQ M
GLUCOSE SERPL-MCNC: 171 MG/DL (ref 65–140)
HCT VFR BLD AUTO: 36.4 % (ref 36.5–49.3)
HGB BLD-MCNC: 11.9 G/DL (ref 12–17)
IMM GRANULOCYTES # BLD AUTO: 0.04 THOUSAND/UL (ref 0–0.2)
IMM GRANULOCYTES NFR BLD AUTO: 1 % (ref 0–2)
LYMPHOCYTES # BLD AUTO: 1.45 THOUSANDS/ΜL (ref 0.6–4.47)
LYMPHOCYTES NFR BLD AUTO: 22 % (ref 14–44)
MCH RBC QN AUTO: 31.8 PG (ref 26.8–34.3)
MCHC RBC AUTO-ENTMCNC: 32.7 G/DL (ref 31.4–37.4)
MCV RBC AUTO: 97 FL (ref 82–98)
MONOCYTES # BLD AUTO: 0.78 THOUSAND/ΜL (ref 0.17–1.22)
MONOCYTES NFR BLD AUTO: 12 % (ref 4–12)
NEUTROPHILS # BLD AUTO: 4.3 THOUSANDS/ΜL (ref 1.85–7.62)
NEUTS SEG NFR BLD AUTO: 63 % (ref 43–75)
NRBC BLD AUTO-RTO: 0 /100 WBCS
PLATELET # BLD AUTO: 129 THOUSANDS/UL (ref 149–390)
PMV BLD AUTO: 10.4 FL (ref 8.9–12.7)
POTASSIUM SERPL-SCNC: 3.9 MMOL/L (ref 3.5–5.3)
PROT SERPL-MCNC: 7.7 G/DL (ref 6.4–8.4)
RBC # BLD AUTO: 3.74 MILLION/UL (ref 3.88–5.62)
SODIUM SERPL-SCNC: 139 MMOL/L (ref 135–147)
WBC # BLD AUTO: 6.7 THOUSAND/UL (ref 4.31–10.16)

## 2022-09-22 PROCEDURE — 70450 CT HEAD/BRAIN W/O DYE: CPT

## 2022-09-22 PROCEDURE — 36415 COLL VENOUS BLD VENIPUNCTURE: CPT

## 2022-09-22 PROCEDURE — 85025 COMPLETE CBC W/AUTO DIFF WBC: CPT

## 2022-09-22 PROCEDURE — 99285 EMERGENCY DEPT VISIT HI MDM: CPT | Performed by: EMERGENCY MEDICINE

## 2022-09-22 PROCEDURE — 80053 COMPREHEN METABOLIC PANEL: CPT

## 2022-09-22 PROCEDURE — G1004 CDSM NDSC: HCPCS

## 2022-09-22 PROCEDURE — 99284 EMERGENCY DEPT VISIT MOD MDM: CPT

## 2022-09-22 RX ORDER — MECLIZINE HCL 12.5 MG/1
25 TABLET ORAL ONCE
Status: COMPLETED | OUTPATIENT
Start: 2022-09-22 | End: 2022-09-22

## 2022-09-22 RX ORDER — MECLIZINE HCL 12.5 MG/1
25 TABLET ORAL EVERY 8 HOURS PRN
Status: DISCONTINUED | OUTPATIENT
Start: 2022-09-22 | End: 2022-09-22

## 2022-09-22 RX ORDER — MECLIZINE HYDROCHLORIDE 25 MG/1
25 TABLET ORAL EVERY 8 HOURS PRN
Qty: 30 TABLET | Refills: 0 | Status: SHIPPED | OUTPATIENT
Start: 2022-09-22

## 2022-09-22 RX ADMIN — MECLIZINE 25 MG: 12.5 TABLET ORAL at 17:12

## 2022-09-22 NOTE — ED PROVIDER NOTES
History  Chief Complaint   Patient presents with    Dizziness     Pt reports dizziness and 6/10 frontal head pain x 2 days  History of stroke  Denies new weakness  Denies other complaints  61YO M with history of stroke (July 2018 w/ chronic residual R-sided weakness, R facial droop, slurred speech), HTN, HLD, DM, obesity, PVCs, ESRD (received dialysis today) who presents with dizziness and frontal HA  Pt reports he began experiencing headache and dizziness two days ago while he was watching TV  Reports feeling like room is spinning  Feels dizziness worsens with lying down and worsens with standing up  Last evaluated for dizziness on 4/7/2022 at [address] [facility] ED, improved with meclizine at the time  Denies new weakness  Denies sensory deficits  Denies chest pain, SOB, fever/chills  Denies recent head trauma  Sxs unchanged after dialysis today  Drank two 16-oz bottles of water today (usually drinks three daily)  Reports room spinning, feels unsteady on his feet  Denies current diplopia or visual disturbances, but wife reports he did state he was seeing 4 of her this AM, pt currently denies this sxs  Denies nausea, but did vomit at dialysis today  Denies difficulty swallowing  Reports usual abdominal cramping following dialysis  Patient on ASA 81 and Plavix 75  Not a current smoker  VS at admission: 126/62, HR 86, afebrile  Prior to Admission Medications   Prescriptions Last Dose Informant Patient Reported? Taking? BD Pen Needle Adina U/F 32G X 4 MM MISC  Spouse/Significant Other No No   Sig: USE TO INJECT INSULIN 4 TIMES DAILY   Blood Glucose Monitoring Suppl (True Metrix Meter) w/Device KIT   No No   Sig: Use to test blood sugars 3 times daily   Blood Pressure Monitoring (BLOOD PRESSURE CUFF) MISC  Spouse/Significant Other No No   Sig: Use to check blood pressure before taking blood pressure medication and 1 hour after and follow instructions provided in discharge instructions based on the readings  Cholecalciferol (Vitamin D3) 1 25 MG (47358 UT) CAPS   No No   Sig: TAKE 1 CAPSULE BY MOUTH ONE TIME PER WEEK   Cinacalcet HCl (SENSIPAR PO)   Yes No   Sig: Take 60 mg by mouth 3 (three) times a week   Insulin Syringe-Needle U-100 (B-D INS SYRINGE 0 5CC/30GX1/2") 30G X 1/2" 0 5 ML MISC   No No   Sig: Inject once daily   Lancets Ultra Thin 30G MISC  Spouse/Significant Other No No   Sig: Use 3 times a day   Lantus 100 UNIT/ML subcutaneous injection   No No   Sig: INJECT 14 UNITS UNDER THE SKIN DAILY   ONETOUCH DELICA LANCETS 40Q MISC  Spouse/Significant Other No No   Sig: by Does not apply route 3 (three) times a day   Semaglutide,0 25 or 0 5MG/DOS, (Ozempic, 0 25 or 0 5 MG/DOSE,) 2 MG/1 5ML SOPN   No No   Sig: Inject 0 25 mg once a week   TORSEMIDE PO  Spouse/Significant Other Yes No   Sig: Take 50 mg by mouth Pt takes 50 mg daily on non- dialysis days: sat, sun, Tues, and thrusday  Pt takes 10 mg of torsemide daily on dialysis days m- w- f     aspirin (ECOTRIN LOW STRENGTH) 81 mg EC tablet  Spouse/Significant Other Yes No   Sig: Take 81 mg by mouth daily Resume on 8/14     atorvastatin (LIPITOR) 40 mg tablet   No No   Sig: TAKE 1 TABLET BY MOUTH DAILY WITH DINNER   calcium acetate (CALPHRON) 667 mg  Spouse/Significant Other Yes No   Sig: TAKE 2 TABLETS BY MOUTH THREE TIMES DAILY WITH MEALS   carvedilol (COREG) 12 5 mg tablet   No No   Sig: Take 1 tablet (12 5 mg total) by mouth 2 (two) times a day with meals   clopidogrel (PLAVIX) 75 mg tablet   No No   Sig: Take 1 tablet (75 mg total) by mouth in the morning     glucose blood (True Metrix Blood Glucose Test) test strip   No No   Sig: Use 1 each 3 (three) times a day Use as instructed   insulin lispro (HumaLOG KwikPen) 100 units/mL injection pen  Spouse/Significant Other No No   Sig: INJECT 4 UNITS 3 TIMES A DAY WITH MEALS PLUS SCALE (max daily dose 42 units)   isosorbide mononitrate (IMDUR) 30 mg 24 hr tablet   No No   Sig: Take 1 tablet (30 mg total) by mouth daily   sertraline (ZOLOFT) 25 mg tablet   No No   Sig: Take 1 tablet (25 mg total) by mouth daily at bedtime   torsemide (DEMADEX) 100 mg tablet   Yes No   Sig: Take 50 mg by mouth daily   torsemide (DEMADEX) 100 mg tablet   No No   Sig: Take 1 tablet (100 mg total) by mouth daily for 2 doses      Facility-Administered Medications: None       Past Medical History:   Diagnosis Date    Cerebrovascular accident (CVA) due to thrombosis of left middle cerebral artery ([facility] ) 7/29/2018    Chronic kidney disease     Diabetes mellitus ([place] [address] )     GERD (gastroesophageal reflux disease)     Hypercholesteremia     Hyperlipidemia     Hypertension     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    Neuropathy     Obesity     Osteomyelitis ([place] [address] )     last assessed 11/4/16    PVC's (premature ventricular contractions)     sees cardiology Dr Kameron camargo    [facility]roke [facility])     last weeof July 2018 [address]    TIA (transient ischemic attack) 10/28/2018       Past Surgical History:   Procedure Laterality Date    ABDOMINAL SURGERY      CARDIAC CATHETERIZATION N/A 5/2/2022    Procedure: Cardiac Coronary Angiogram;  Surgeon: Mayra Sarmiento MD;  [facility] CARD[facility]AC [facility]; Service: Cardiology    CARDIAC CATHETERIZATION N/A 5/2/2022    Procedure: Cardiac pci;  Surgeon: Mayra Sarmiento MD;  [facility] CARD[facility]AC CATH LAB; Service: Cardiology    CHOLECYSTECTOMY      Percutaneous    COLONOSCOPY      CYSTOSCOPY      OTHER SURGICAL HISTORY      "stimulator to control bowel movements"    NV ESOPHAGOGASTRODUODENOSCOPY TRANSORAL DIAGNOSTIC N/A 9/27/2016    Procedure: ESOPHAGOGASTRODUODENOSCOPY (EGD); Surgeon: Koko Moses MD;  Location: [facility] GI LAB;   Service: Gastroenterology    NV LAP,CHOLECYSTECTOMY N/A 2/29/2016    Procedure: LAPAROSCOPIC CHOLECYSTECTOMY ;  Surgeon: Xin Dominguez DO;  Location: [facility] Main OR;  Service: General    ROTATOR CUFF REPAIR Right     TOE AMPUTATION Right 10/28/2016    Procedure: Cheryl Snowden TOE AMPUTATION ;  Surgeon: Mel You DPM;  Location: [facility] Main OR;  Service:        Family History   Problem Relation Age of Onset    Leukemia Mother     Liver disease Mother     Lung cancer Mother         heavy smoker - 3 ppd    Heart disease Father     Liver disease Father     Multiple myeloma Sister     Breast cancer Sister     Urolithiasis Family     Alcohol abuse Neg Hx     Depression Neg Hx     Drug abuse Neg Hx     Substance Abuse Neg Hx     Mental illness Neg Hx      I have reviewed and agree with the history as documented  E-Cigarette/Vaping    E-Cigarette Use Never User      E-Cigarette/Vaping Substances    Nicotine No     THC No     CBD No     Flavoring No     Other No     Unknown No      Social History     Tobacco Use    Smoking status: Never Smoker    Smokeless tobacco: Never Used   Vaping Use    Vaping Use: Never used   Substance Use Topics    Alcohol use: Not Currently    Drug use: No        Review of Systems   Constitutional: Negative for chills, diaphoresis, fatigue and fever  HENT: Negative for ear pain, tinnitus and trouble swallowing  Eyes: Negative for pain and visual disturbance  Respiratory: Negative for cough, chest tightness and shortness of breath  Cardiovascular: Negative for chest pain, palpitations and leg swelling  Gastrointestinal: Positive for abdominal pain (usual abd cramping following dialysis) and vomiting  Negative for nausea  Neurological: Positive for dizziness and headaches (bifrontal, 6/10)  Negative for weakness (no new weakness) and numbness  All other systems reviewed and are negative        Physical Exam  ED Triage Vitals   Temperature Pulse Respirations Blood Pressure SpO2   09/22/22 1515 09/22/22 1515 09/22/22 1515 09/22/22 1515 09/22/22 1515   98 °F (36 7 °C) 86 18 126/62 96 %      Temp Source Heart Rate Source Patient Position - Orthostatic VS BP Location FiO2 (%)   09/22/22 1515 09/22/22 1515 09/22/22 1716 09/22/22 1716 --   Oral Monitor Lying Right arm       Pain Score       --                    Orthostatic Vital Signs  Vitals:    09/22/22 1615 09/22/22 1716 09/22/22 1718 09/22/22 1720   BP: 118/64 155/67 134/58 133/60   Pulse: 83 84 85 86   Patient Position - Orthostatic VS:  Lying Sitting        Physical Exam  Vitals and nursing note reviewed  Constitutional:       General: He is in acute distress  Appearance: Normal appearance  He is obese  He is not toxic-appearing or diaphoretic  HENT:      Head: Normocephalic and atraumatic  Right Ear: External ear normal       Left Ear: External ear normal       Nose: Nose normal       Mouth/Throat:      Mouth: Mucous membranes are moist    Eyes:      General:         Right eye: No discharge  Left eye: No discharge  Extraocular Movements: Extraocular movements intact  Conjunctiva/sclera: Conjunctivae normal       Pupils: Pupils are equal, round, and reactive to light  Cardiovascular:      Rate and Rhythm: Normal rate and regular rhythm  Pulses: Normal pulses  Heart sounds: Normal heart sounds  Pulmonary:      Effort: Pulmonary effort is normal  No respiratory distress  Breath sounds: Normal breath sounds  No stridor  No wheezing, rhonchi or rales  Chest:      Chest wall: No tenderness  Abdominal:      General: There is no distension  Palpations: Abdomen is soft  Tenderness: There is no abdominal tenderness  There is no guarding  Musculoskeletal:         General: No tenderness  Normal range of motion  Cervical back: Normal range of motion and neck supple  Right lower leg: No edema  Left lower leg: No edema  Skin:     General: Skin is warm and dry  Neurological:      Mental Status: He is alert  Cranial Nerves: No cranial nerve deficit  Sensory: No sensory deficit  Motor: No weakness (no new weakness)        Coordination: Coordination normal       Gait: Gait (gait baseline) normal  Deep Tendon Reflexes: Reflexes normal    Psychiatric:      Comments: anxious         ED Medications  Medications   meclizine (ANTIVERT) tablet 25 mg (25 mg Oral Given 9/22/22 1712)       Diagnostic Studies  Results Reviewed     Procedure Component Value Units Date/Time    Comprehensive metabolic panel [[id number]]  (Abnormal) Collected: 09/22/22 1724    Lab Status: Final result Specimen: Blood from Arm, Right Updated: 09/22/22 1806     Sodium 139 mmol/L      Potassium 3 9 mmol/L      Chloride 93 mmol/L      CO2 36 mmol/L      ANION GAP 10 mmol/L      BUN 26 mg/dL      Creatinine 5 29 mg/dL      Glucose 171 mg/dL      Calcium 9 8 mg/dL      AST 15 U/L      ALT 15 U/L      Alkaline Phosphatase 93 U/L      Total Protein 7 7 g/dL      Albumin 4 4 g/dL      Total Bilirubin 0 47 mg/dL      eGFR 10 ml/min/1 73sq m     Narrative:      National Kidney Disease Foundation guidelines for Chronic Kidney Disease (CKD):     Stage 1 with normal or high GFR (GFR > 90 mL/min/1 73 square meters)    Stage 2 Mild CKD (GFR = 60-89 mL/min/1 73 square meters)    Stage 3A Moderate CKD (GFR = 45-59 mL/min/1 73 square meters)    Stage 3B Moderate CKD (GFR = 30-44 mL/min/1 73 square meters)    Stage 4 Severe CKD (GFR = 15-29 mL/min/1 73 square meters)    Stage 5 End Stage CKD (GFR <15 mL/min/1 73 square meters)  Note: GFR calculation is accurate only with a steady state creatinine    CBC and differential [[id number]]  (Abnormal) Collected: 09/22/22 1724    Lab Status: Final result Specimen: Blood from Arm, Right Updated: 09/22/22 1735     WBC 6 70 Thousand/uL      RBC 3 74 Million/uL      Hemoglobin 11 9 g/dL      Hematocrit 36 4 %      MCV 97 fL      MCH 31 8 pg      MCHC 32 7 g/dL      RDW 14 9 %      MPV 10 4 fL      Platelets 393 Thousands/uL      nRBC 0 /100 WBCs      Neutrophils Relative 63 %      Immat GRANS % 1 %      Lymphocytes Relative 22 %      Monocytes Relative 12 %      Eosinophils Relative 2 %      Basophils Relative 0 % Neutrophils Absolute 4 30 Thousands/µL      Immature Grans Absolute 0 04 Thousand/uL      Lymphocytes Absolute 1 45 Thousands/µL      Monocytes Absolute 0 78 Thousand/µL      Eosinophils Absolute 0 10 Thousand/µL      Basophils Absolute 0 03 Thousands/µL                  CT head without contrast   Final Result by Good Mccabe MD (09/22 1723)      No acute intracranial abnormality  Workstation performed: [id number]               Procedures  ECG 12 Lead Documentation Only    Date/Time: 9/22/2022 6:33 PM  Performed by: Phong Black DO  Authorized by: Phong Black DO     ECG reviewed by me, the ED Provider: yes    Previous ECG:     Previous ECG:  Unavailable (MUSE not working)  Interpretation:     Interpretation: abnormal    Rate:     ECG rate assessment: normal    Rhythm:     Rhythm: sinus rhythm    Ectopy:     Ectopy: none    QRS:     QRS axis:  Normal  Conduction:     Conduction: normal    ST segments:     ST segments:  Non-specific  T waves:     T waves: normal    Comments:      RBBB, prolonged QTc (531)          ED Course                                       MDM  Number of Diagnoses or Management Options  Dizziness  Diagnosis management comments: 59YO M with history of stroke (July 2018, residual R-sided weakness, R-sided facial droop, slurred speech) who presents with 2-day history of dizziness and frontal HA  Denies new weakness  Denies sensory deficits  Denies chest pain, SOB, fever/chills  Denies recent head trauma  Sxs unchanged after dialysis today  Drank two 16-oz bottles of water today (usually drinks three daily)  Reports room spinning, feels unsteady on his feet  Denies current diplopia or visual disturbances, but wife reports he did state he was seeing 4 of her this AM, pt currently denies this sxs  Denies nausea, but did vomit at dialysis today  Denies difficulty swallowing  Reports usual abdominal cramping following dialysis   No worsening truncal ataxia compared to baseline as observed on evaluation today and reported by wife  JUDY exam findings include correctional saccades with head impulse, no nystagmus appreciated on primary or end-gaze, and no vertical skew  No dysmetria  No diadochokinesia  Plan: CBC, CMP, EKG, Orthostatic vitals, EKG, CTH wo contrast, Meclizine 25mg q8h prn    17:35 - CTH wo contrast: No acute abnormalities  Orthostatics abnormal; however pt reports asx with positional changes  Pt reports dizziness and headache have resolved  Pt wants to go home  18:31 - EKG showed NSR, RBBB, prolonged QTc (536) compared to 07/2022  CO2 36, possibly secondary to hypoventilation  Discharge on meclizine, f/u with PCP, referral for ENT, recommend avoid medications that prolong QT  Disposition  Final diagnoses:   Dizziness     Time reflects when diagnosis was documented in both MDM as applicable and the Disposition within this note     Time User Action Codes Description Comment    9/22/2022  5:37 PM Manjit Ortega Add [R42] Dizziness       ED Disposition     ED Disposition   Discharge    Condition   Stable    Date/Time   Thu Sep 22, 2022  6:31 PM    Comment   [address] discharge to home/self care  Follow-up Information     Follow up With Specialties Details [address], DO Internal Medicine Schedule an appointment as soon as possible for a visit in 1 week  [address] [place] [address]  [address] [facility]      TimiMarked Tree MD Peter Otolaryngology Schedule an appointment as soon as possible for a visit in 1 week  [address] [facility]  [address] [facility]            Patient's Medications   Discharge Prescriptions    MECLIZINE (ANTIVERT) 25 MG TABLET    Take 1 tablet (25 mg total) by mouth every 8 (eight) hours as needed for dizziness       Start Date: 9/22/2022 End Date: --       Order Dose: 25 mg       Quantity: 30 tablet    Refills: 0     No discharge procedures on file      PDMP Review Value Time User    PDMP Reviewed  Yes 7/14/2022  3:15 AM Faviola Nieves MD           ED Provider  Attending physically available and evaluated Memo Harrington GUERLINE managed the patient along with the ED Attending      Electronically Signed by         Virginia Anaya DO  09/22/22 2786

## 2022-09-22 NOTE — DISCHARGE INSTRUCTIONS
Recommend follow up with PCP within 1 week of discharge, given intermittent symptoms of dizziness/headache  If symptoms are persistent and worsening, patient recommended to return to emergency department for further evaluation and management  Referral to ENT for evaluation and management of peripheral vertigo  Avoid medications that prolong QT

## 2022-09-22 NOTE — ED ATTENDING ATTESTATION
9/22/2022  IMatilde MD, saw and evaluated the patient  I have discussed the patient with the resident/non-physician practitioner and agree with the resident's/non-physician practitioner's findings, Plan of Care, and MDM as documented in the resident's/non-physician practitioner's note, except where noted  All available labs and Radiology studies were reviewed  I was present for key portions of any procedure(s) performed by the resident/non-physician practitioner and I was immediately available to provide assistance  At this point I agree with the current assessment done in the Emergency Department  I have conducted an independent evaluation of this patient a history and physical is as follows:    77-year-old male with history of diabetes, end-stage renal disease on HD, prior CVA, hypertension present for evaluation of intermittent vertigo, headache beginning 2 days ago  States symptoms worse when he wakes in the morning  Headache/vertigo often last just for 15 minutes and then resolved spontaneously butter recurrent throughout the day  Has had vertigo in the past and had improvement with meclizine  He reported having some abnormal vision this morning which since resolved  Stated that he had quadruple vision briefly  Vertigo worsens with movement when it is present  History of residual right-sided weakness, slight right facial droop, abnormal speech from prior CVA  No acute focal neurologic deficit on exam today  Normal HINTS exam   Currently asymptomatic  Denies any current headache or vertigo  Also denies any shortness of breath, chest pain, abdominal pain, nausea  He had full dialysis treatment today  Plan labs, CT head, re-evaluate  ED Course  ED Course as of 09/23/22 0118   u Sep 22, 2022   1744 CT head unremarkable     1819 Creatinine(!): 5 29  Stable on HD    [facility] discharge home, ENT/PCP follow-up if symptoms persist          Critical Care Time  Procedures

## 2022-10-13 ENCOUNTER — TELEPHONE (OUTPATIENT)
Dept: FAMILY MEDICINE CLINIC | Facility: CLINIC | Age: 62
End: 2022-10-13

## 2022-10-13 DIAGNOSIS — I25.10 CAD (CORONARY ARTERY DISEASE): ICD-10-CM

## 2022-10-13 RX ORDER — CARVEDILOL 12.5 MG/1
12.5 TABLET ORAL 2 TIMES DAILY WITH MEALS
Qty: 180 TABLET | Refills: 3 | Status: SHIPPED | OUTPATIENT
Start: 2022-10-13 | End: 2022-11-12

## 2022-10-14 ENCOUNTER — SOCIAL WORK (OUTPATIENT)
Dept: BEHAVIORAL/MENTAL HEALTH CLINIC | Facility: CLINIC | Age: 62
End: 2022-10-14
Payer: MEDICARE

## 2022-10-14 DIAGNOSIS — F32.1 CURRENT MODERATE EPISODE OF MAJOR DEPRESSIVE DISORDER, UNSPECIFIED WHETHER RECURRENT (HCC): Primary | ICD-10-CM

## 2022-10-14 PROCEDURE — 90832 PSYTX W PT 30 MINUTES: CPT | Performed by: SOCIAL WORKER

## 2022-10-17 NOTE — PSYCH
1  Current moderate episode of major depressive disorder, unspecified whether recurrent ([facility] [address] )       Data: Pt presents to this writer with increased onset of depression over several weeks and months  Pt presents with daughter who is supportive  Pt is going through dialysis, and is at end stage kidney failure, and is on the transplant list  Daughter reports her father has been very rosales, down, agitated, starting fights with people in dialysis, aggressive at times, difficult to be around  Daughter reports pt can flip out of nowhere and go from being relatively calm, to being angry and aggressive  Pt reports this is not pt's baseline, and she is worried his medications aren't working, or he is not getting the right medications  Pt reports he has been working with his doctor and has been started on low dose of Zoloft  Pt and daughter feels this is not enough  Daughter also reports concern with pt being on medication as it related to the functioning of pt's kidneys  Pt report she also sees a neurologist, but it was difficult to get and understanding of what Neuro is recommending for pt  Daughter reports her brother is normally the one who helps her father with medical appointments, and she was not aware of what Neuro has recommended pt  Daughter was not sure of the difference between psychiatrists and psychologists  This writer suggests the garza difference is the psychiatrist handles the medication side of things, and the psychologist is more connected with talk therapy  Daughter and pt both feel like the medication is the key thing to get correct at this time  Daughter does not feel pt being in talk therapy is going to help him as the complexity of his medical and neurological needs are such, that they feel he would benefit much more from getting on good medicine to help control the anger, and mood instability  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   Pt himself is tearful during the interview process as he feels like he is not himself  Pt admits he has been very depressed during the kidney disease process, but he does not know why he is feeling so angry and agitated  Pt reports this is definitely not his baseline  Pt reports he has a very supportive wider family, no major trauma issues  Some depression runs in his family  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 and appears to be struggling with the process of not having control of his affairs, and many other people telling him what to do  Pt and daughter admit pt is not always compliant with water intake and he often has fluid overload  Daughter reports her father is also angry that he was switched from chair time of M  T F to T  T S and this has made him very angry  [facility] said this was because they needed extra staffing to manage him and he needed to switch days, accordingly  This writer and pt/ daughter discussed the opportunity of therapy being available, and how it can help with persons going through depression, or even people who are struggling to manage with emotions which come with going through kidney failure  Daughter said she feels like they would rather go with a psychiatrist at this point, and when her father is more stable, to come back to therapy  This writer finished up the session, by encouraging pt to work on some self care techniques, and to identify what he reduces stress in his life, and come up with some goals to help him reduce stress, and improve quality of life  Pt is encouraged to follow up with this writer as needed  Assessment: Pt was all that verbal during session  Daughter did a lot of speaking for pt  Pt has limited insight, and seemed overwhelmed with all the medical and neurological events in his life  Pt appears to have some mood instability, with depression and anxiety, but a full psychiatric evaluation would be beneficial      Plan: Follow up as needed         Psychotherapy Provided: Individual Psychotherapy 45 minutes     Length of time in session: 28 minutes from 1:13 to 1:39, follow up as needed  Goals addressed in session: Goal 1     Pain:      moderate to severe    3    Current suicide risk : Low     Pt is future oriented and not suicidal        Behavioral Health Treatment Plan [facility]: Diagnosis and Treatment Plan explained to Patricio Sterner relates understanding diagnosis and is agreeable to Treatment Plan   Yes

## 2022-10-18 DIAGNOSIS — F41.8 ANXIETY ASSOCIATED WITH DEPRESSION: Primary | ICD-10-CM

## 2022-10-20 ENCOUNTER — APPOINTMENT (EMERGENCY)
Dept: RADIOLOGY | Facility: HOSPITAL | Age: 62
End: 2022-10-20
Payer: MEDICARE

## 2022-10-20 ENCOUNTER — HOSPITAL ENCOUNTER (OUTPATIENT)
Facility: HOSPITAL | Age: 62
Setting detail: OBSERVATION
Discharge: HOME | End: 2022-10-21
Attending: EMERGENCY MEDICINE | Admitting: HOSPITALIST
Payer: MEDICARE

## 2022-10-20 DIAGNOSIS — R06.02 SOB (SHORTNESS OF BREATH): ICD-10-CM

## 2022-10-20 DIAGNOSIS — Z99.2 ESRD ON DIALYSIS (HCC): ICD-10-CM

## 2022-10-20 DIAGNOSIS — R77.8 ELEVATED TROPONIN: Primary | ICD-10-CM

## 2022-10-20 DIAGNOSIS — J40 BRONCHITIS: ICD-10-CM

## 2022-10-20 DIAGNOSIS — N18.6 ESRD ON DIALYSIS (HCC): ICD-10-CM

## 2022-10-20 DIAGNOSIS — N18.6 ESRD ON HEMODIALYSIS (HCC): ICD-10-CM

## 2022-10-20 DIAGNOSIS — R06.00 DYSPNEA: ICD-10-CM

## 2022-10-20 DIAGNOSIS — Z99.2 ESRD ON HEMODIALYSIS (HCC): ICD-10-CM

## 2022-10-20 PROCEDURE — 83605 ASSAY OF LACTIC ACID: CPT | Performed by: EMERGENCY MEDICINE

## 2022-10-20 PROCEDURE — 71045 X-RAY EXAM CHEST 1 VIEW: CPT

## 2022-10-20 PROCEDURE — 87040 BLOOD CULTURE FOR BACTERIA: CPT | Performed by: EMERGENCY MEDICINE

## 2022-10-20 PROCEDURE — 85610 PROTHROMBIN TIME: CPT | Performed by: EMERGENCY MEDICINE

## 2022-10-20 PROCEDURE — 87154 CUL TYP ID BLD PTHGN 6+ TRGT: CPT | Performed by: EMERGENCY MEDICINE

## 2022-10-20 PROCEDURE — 85025 COMPLETE CBC W/AUTO DIFF WBC: CPT | Performed by: EMERGENCY MEDICINE

## 2022-10-20 PROCEDURE — 0241U HB NFCT DS VIR RESP RNA 4 TRGT: CPT | Performed by: EMERGENCY MEDICINE

## 2022-10-20 PROCEDURE — 99285 EMERGENCY DEPT VISIT HI MDM: CPT

## 2022-10-20 PROCEDURE — 85730 THROMBOPLASTIN TIME PARTIAL: CPT | Performed by: EMERGENCY MEDICINE

## 2022-10-20 PROCEDURE — 83880 ASSAY OF NATRIURETIC PEPTIDE: CPT | Performed by: EMERGENCY MEDICINE

## 2022-10-20 PROCEDURE — 80053 COMPREHEN METABOLIC PANEL: CPT | Performed by: EMERGENCY MEDICINE

## 2022-10-20 PROCEDURE — 84484 ASSAY OF TROPONIN QUANT: CPT | Performed by: EMERGENCY MEDICINE

## 2022-10-20 PROCEDURE — 99285 EMERGENCY DEPT VISIT HI MDM: CPT | Performed by: EMERGENCY MEDICINE

## 2022-10-20 PROCEDURE — 93005 ELECTROCARDIOGRAM TRACING: CPT

## 2022-10-20 PROCEDURE — 36415 COLL VENOUS BLD VENIPUNCTURE: CPT | Performed by: EMERGENCY MEDICINE

## 2022-10-20 RX ORDER — ALBUTEROL SULFATE 90 UG/1
2 AEROSOL, METERED RESPIRATORY (INHALATION) ONCE
Status: COMPLETED | OUTPATIENT
Start: 2022-10-20 | End: 2022-10-20

## 2022-10-20 RX ADMIN — ALBUTEROL SULFATE 2 PUFF: 90 AEROSOL, METERED RESPIRATORY (INHALATION) at 23:57

## 2022-10-20 NOTE — LETTER
Thank you for allowing us to participate in the care of your patient, Agnes Vasquez, who was hospitalized from [unfilled] through 10/21/2022 with the admitting diagnosis of shortness of breath  Chief complaint 2 days of shortness of breath/coughing  Went to hemodialysis appointment yesterday and was short by approximately 30 minutes  Son is sick, but denies having made physical contact with him in about a week  Workup unremarkable  Symptoms resolved morning after presentation to ED  If you have any additional questions or would like to discuss further, please feel free to contact me      [id number] [facility] Internal Medicine, Hospitalist  [phone]

## 2022-10-21 ENCOUNTER — APPOINTMENT (OUTPATIENT)
Dept: VASCULAR ULTRASOUND | Facility: HOSPITAL | Age: 62
End: 2022-10-21
Payer: MEDICARE

## 2022-10-21 VITALS
OXYGEN SATURATION: 97 % | HEART RATE: 77 BPM | DIASTOLIC BLOOD PRESSURE: 75 MMHG | TEMPERATURE: 98.9 F | RESPIRATION RATE: 16 BRPM | SYSTOLIC BLOOD PRESSURE: 139 MMHG

## 2022-10-21 PROBLEM — R06.02 SOB (SHORTNESS OF BREATH): Status: ACTIVE | Noted: 2022-10-21

## 2022-10-21 PROBLEM — M79.89 LEFT ARM SWELLING: Status: ACTIVE | Noted: 2022-10-21

## 2022-10-21 LAB
2HR DELTA HS TROPONIN: 0 NG/L
4HR DELTA HS TROPONIN: -26 NG/L
ALBUMIN SERPL BCP-MCNC: 4.2 G/DL (ref 3.5–5)
ALP SERPL-CCNC: 77 U/L (ref 34–104)
ALT SERPL W P-5'-P-CCNC: 14 U/L (ref 7–52)
ANION GAP SERPL CALCULATED.3IONS-SCNC: 13 MMOL/L (ref 4–13)
ANION GAP SERPL CALCULATED.3IONS-SCNC: 13 MMOL/L (ref 4–13)
APTT PPP: 30 SECONDS (ref 23–37)
AST SERPL W P-5'-P-CCNC: 14 U/L (ref 13–39)
BASOPHILS # BLD AUTO: 0.02 THOUSANDS/ÂΜL (ref 0–0.1)
BASOPHILS # BLD AUTO: 0.04 THOUSANDS/ÂΜL (ref 0–0.1)
BASOPHILS NFR BLD AUTO: 0 % (ref 0–1)
BASOPHILS NFR BLD AUTO: 1 % (ref 0–1)
BILIRUB SERPL-MCNC: 0.7 MG/DL (ref 0.2–1)
BNP SERPL-MCNC: 283 PG/ML (ref 0–100)
BUN SERPL-MCNC: 35 MG/DL (ref 5–25)
BUN SERPL-MCNC: 37 MG/DL (ref 5–25)
CALCIUM SERPL-MCNC: 9 MG/DL (ref 8.4–10.2)
CALCIUM SERPL-MCNC: 9.7 MG/DL (ref 8.4–10.2)
CARDIAC TROPONIN I PNL SERPL HS: 123 NG/L
CARDIAC TROPONIN I PNL SERPL HS: 149 NG/L
CARDIAC TROPONIN I PNL SERPL HS: 149 NG/L
CHLORIDE SERPL-SCNC: 95 MMOL/L (ref 96–108)
CHLORIDE SERPL-SCNC: 96 MMOL/L (ref 96–108)
CO2 SERPL-SCNC: 32 MMOL/L (ref 21–32)
CO2 SERPL-SCNC: 33 MMOL/L (ref 21–32)
CREAT SERPL-MCNC: 6.64 MG/DL (ref 0.6–1.3)
CREAT SERPL-MCNC: 6.92 MG/DL (ref 0.6–1.3)
EOSINOPHIL # BLD AUTO: 0.16 THOUSAND/ÂΜL (ref 0–0.61)
EOSINOPHIL # BLD AUTO: 0.18 THOUSAND/ÂΜL (ref 0–0.61)
EOSINOPHIL NFR BLD AUTO: 2 % (ref 0–6)
EOSINOPHIL NFR BLD AUTO: 3 % (ref 0–6)
ERYTHROCYTE [DISTWIDTH] IN BLOOD BY AUTOMATED COUNT: 14.7 % (ref 11.6–15.1)
ERYTHROCYTE [DISTWIDTH] IN BLOOD BY AUTOMATED COUNT: 14.8 % (ref 11.6–15.1)
FLUAV RNA RESP QL NAA+PROBE: NEGATIVE
FLUBV RNA RESP QL NAA+PROBE: NEGATIVE
GFR SERPL CREATININE-BSD FRML MDRD: 7 ML/MIN/1.73SQ M
GFR SERPL CREATININE-BSD FRML MDRD: 8 ML/MIN/1.73SQ M
GLUCOSE P FAST SERPL-MCNC: 203 MG/DL (ref 65–99)
GLUCOSE SERPL-MCNC: 107 MG/DL (ref 65–140)
GLUCOSE SERPL-MCNC: 128 MG/DL (ref 65–140)
GLUCOSE SERPL-MCNC: 164 MG/DL (ref 65–140)
GLUCOSE SERPL-MCNC: 203 MG/DL (ref 65–140)
HCT VFR BLD AUTO: 33.3 % (ref 36.5–49.3)
HCT VFR BLD AUTO: 36.9 % (ref 36.5–49.3)
HGB BLD-MCNC: 10.7 G/DL (ref 12–17)
HGB BLD-MCNC: 11.7 G/DL (ref 12–17)
IMM GRANULOCYTES # BLD AUTO: 0.03 THOUSAND/UL (ref 0–0.2)
IMM GRANULOCYTES # BLD AUTO: 0.04 THOUSAND/UL (ref 0–0.2)
IMM GRANULOCYTES NFR BLD AUTO: 1 % (ref 0–2)
IMM GRANULOCYTES NFR BLD AUTO: 1 % (ref 0–2)
INR PPP: 1.01 (ref 0.84–1.19)
LACTATE SERPL-SCNC: 0.9 MMOL/L (ref 0.5–2)
LYMPHOCYTES # BLD AUTO: 1.4 THOUSANDS/ÂΜL (ref 0.6–4.47)
LYMPHOCYTES # BLD AUTO: 1.61 THOUSANDS/ÂΜL (ref 0.6–4.47)
LYMPHOCYTES NFR BLD AUTO: 21 % (ref 14–44)
LYMPHOCYTES NFR BLD AUTO: 23 % (ref 14–44)
MCH RBC QN AUTO: 30.9 PG (ref 26.8–34.3)
MCH RBC QN AUTO: 31 PG (ref 26.8–34.3)
MCHC RBC AUTO-ENTMCNC: 31.7 G/DL (ref 31.4–37.4)
MCHC RBC AUTO-ENTMCNC: 32.1 G/DL (ref 31.4–37.4)
MCV RBC AUTO: 96 FL (ref 82–98)
MCV RBC AUTO: 98 FL (ref 82–98)
MONOCYTES # BLD AUTO: 0.72 THOUSAND/ÂΜL (ref 0.17–1.22)
MONOCYTES # BLD AUTO: 0.75 THOUSAND/ÂΜL (ref 0.17–1.22)
MONOCYTES NFR BLD AUTO: 11 % (ref 4–12)
MONOCYTES NFR BLD AUTO: 11 % (ref 4–12)
NEUTROPHILS # BLD AUTO: 4.24 THOUSANDS/ÂΜL (ref 1.85–7.62)
NEUTROPHILS # BLD AUTO: 4.51 THOUSANDS/ÂΜL (ref 1.85–7.62)
NEUTS SEG NFR BLD AUTO: 62 % (ref 43–75)
NEUTS SEG NFR BLD AUTO: 64 % (ref 43–75)
NRBC BLD AUTO-RTO: 0 /100 WBCS
NRBC BLD AUTO-RTO: 0 /100 WBCS
PLATELET # BLD AUTO: 127 THOUSANDS/UL (ref 149–390)
PLATELET # BLD AUTO: 160 THOUSANDS/UL (ref 149–390)
PMV BLD AUTO: 10.2 FL (ref 8.9–12.7)
PMV BLD AUTO: 10.7 FL (ref 8.9–12.7)
POTASSIUM SERPL-SCNC: 3.7 MMOL/L (ref 3.5–5.3)
POTASSIUM SERPL-SCNC: 4 MMOL/L (ref 3.5–5.3)
PROCALCITONIN SERPL-MCNC: 0.41 NG/ML
PROT SERPL-MCNC: 7.4 G/DL (ref 6.4–8.4)
PROTHROMBIN TIME: 13.5 SECONDS (ref 11.6–14.5)
RBC # BLD AUTO: 3.46 MILLION/UL (ref 3.88–5.62)
RBC # BLD AUTO: 3.78 MILLION/UL (ref 3.88–5.62)
RSV RNA RESP QL NAA+PROBE: NEGATIVE
SARS-COV-2 RNA RESP QL NAA+PROBE: NEGATIVE
SODIUM SERPL-SCNC: 141 MMOL/L (ref 135–147)
SODIUM SERPL-SCNC: 141 MMOL/L (ref 135–147)
WBC # BLD AUTO: 6.59 THOUSAND/UL (ref 4.31–10.16)
WBC # BLD AUTO: 7.11 THOUSAND/UL (ref 4.31–10.16)

## 2022-10-21 PROCEDURE — 84484 ASSAY OF TROPONIN QUANT: CPT | Performed by: EMERGENCY MEDICINE

## 2022-10-21 PROCEDURE — 82948 REAGENT STRIP/BLOOD GLUCOSE: CPT

## 2022-10-21 PROCEDURE — 85025 COMPLETE CBC W/AUTO DIFF WBC: CPT

## 2022-10-21 PROCEDURE — 93990 DOPPLER FLOW TESTING: CPT | Performed by: SURGERY

## 2022-10-21 PROCEDURE — 99236 HOSP IP/OBS SAME DATE HI 85: CPT | Performed by: HOSPITALIST

## 2022-10-21 PROCEDURE — 87081 CULTURE SCREEN ONLY: CPT | Performed by: HOSPITALIST

## 2022-10-21 PROCEDURE — 93990 DOPPLER FLOW TESTING: CPT

## 2022-10-21 PROCEDURE — 80048 BASIC METABOLIC PNL TOTAL CA: CPT

## 2022-10-21 PROCEDURE — 84145 PROCALCITONIN (PCT): CPT | Performed by: HOSPITALIST

## 2022-10-21 PROCEDURE — 36415 COLL VENOUS BLD VENIPUNCTURE: CPT | Performed by: EMERGENCY MEDICINE

## 2022-10-21 RX ORDER — INSULIN LISPRO 100 [IU]/ML
1-5 INJECTION, SOLUTION INTRAVENOUS; SUBCUTANEOUS
Status: DISCONTINUED | OUTPATIENT
Start: 2022-10-21 | End: 2022-10-21 | Stop reason: HOSPADM

## 2022-10-21 RX ORDER — ASPIRIN 81 MG/1
162 TABLET, CHEWABLE ORAL ONCE
Status: COMPLETED | OUTPATIENT
Start: 2022-10-21 | End: 2022-10-21

## 2022-10-21 RX ORDER — CLOPIDOGREL BISULFATE 75 MG/1
75 TABLET ORAL DAILY
Status: DISCONTINUED | OUTPATIENT
Start: 2022-10-21 | End: 2022-10-21 | Stop reason: HOSPADM

## 2022-10-21 RX ORDER — SERTRALINE HYDROCHLORIDE 25 MG/1
25 TABLET, FILM COATED ORAL
Status: DISCONTINUED | OUTPATIENT
Start: 2022-10-21 | End: 2022-10-21 | Stop reason: HOSPADM

## 2022-10-21 RX ORDER — CINACALCET 30 MG/1
60 TABLET, FILM COATED ORAL 3 TIMES WEEKLY
Status: DISCONTINUED | OUTPATIENT
Start: 2022-10-22 | End: 2022-10-21 | Stop reason: HOSPADM

## 2022-10-21 RX ORDER — BENZONATATE 100 MG/1
100 CAPSULE ORAL 3 TIMES DAILY PRN
Status: DISCONTINUED | OUTPATIENT
Start: 2022-10-21 | End: 2022-10-21 | Stop reason: HOSPADM

## 2022-10-21 RX ORDER — HEPARIN SODIUM 5000 [USP'U]/ML
5000 INJECTION, SOLUTION INTRAVENOUS; SUBCUTANEOUS EVERY 8 HOURS SCHEDULED
Status: DISCONTINUED | OUTPATIENT
Start: 2022-10-21 | End: 2022-10-21 | Stop reason: HOSPADM

## 2022-10-21 RX ORDER — HEPARIN SODIUM 5000 [USP'U]/ML
5000 INJECTION, SOLUTION INTRAVENOUS; SUBCUTANEOUS EVERY 8 HOURS SCHEDULED
Status: DISCONTINUED | OUTPATIENT
Start: 2022-10-21 | End: 2022-10-21

## 2022-10-21 RX ORDER — TORSEMIDE 10 MG/1
10 TABLET ORAL
Status: DISCONTINUED | OUTPATIENT
Start: 2022-10-21 | End: 2022-10-21

## 2022-10-21 RX ORDER — CINACALCET 30 MG/1
60 TABLET, FILM COATED ORAL 3 TIMES WEEKLY
Status: DISCONTINUED | OUTPATIENT
Start: 2022-10-21 | End: 2022-10-21

## 2022-10-21 RX ORDER — ISOSORBIDE MONONITRATE 30 MG/1
30 TABLET, EXTENDED RELEASE ORAL DAILY
Status: DISCONTINUED | OUTPATIENT
Start: 2022-10-21 | End: 2022-10-21 | Stop reason: HOSPADM

## 2022-10-21 RX ORDER — CARVEDILOL 12.5 MG/1
12.5 TABLET ORAL 2 TIMES DAILY WITH MEALS
Status: DISCONTINUED | OUTPATIENT
Start: 2022-10-21 | End: 2022-10-21 | Stop reason: HOSPADM

## 2022-10-21 RX ORDER — GUAIFENESIN 600 MG/1
600 TABLET, EXTENDED RELEASE ORAL EVERY 12 HOURS SCHEDULED
Qty: 40 TABLET | Refills: 0 | Status: SHIPPED | OUTPATIENT
Start: 2022-10-21 | End: 2022-11-10

## 2022-10-21 RX ORDER — ASPIRIN 81 MG/1
81 TABLET ORAL DAILY
Status: DISCONTINUED | OUTPATIENT
Start: 2022-10-21 | End: 2022-10-21 | Stop reason: HOSPADM

## 2022-10-21 RX ORDER — ATORVASTATIN CALCIUM 40 MG/1
40 TABLET, FILM COATED ORAL
Status: DISCONTINUED | OUTPATIENT
Start: 2022-10-21 | End: 2022-10-21 | Stop reason: HOSPADM

## 2022-10-21 RX ORDER — TORSEMIDE 10 MG/1
10 TABLET ORAL
Status: DISCONTINUED | OUTPATIENT
Start: 2022-10-22 | End: 2022-10-21 | Stop reason: HOSPADM

## 2022-10-21 RX ORDER — BENZONATATE 100 MG/1
100 CAPSULE ORAL 3 TIMES DAILY PRN
Qty: 20 CAPSULE | Refills: 0 | Status: SHIPPED | OUTPATIENT
Start: 2022-10-21

## 2022-10-21 RX ORDER — INSULIN GLARGINE 100 [IU]/ML
14 INJECTION, SOLUTION SUBCUTANEOUS DAILY
Status: DISCONTINUED | OUTPATIENT
Start: 2022-10-21 | End: 2022-10-21 | Stop reason: HOSPADM

## 2022-10-21 RX ORDER — GUAIFENESIN 600 MG/1
600 TABLET, EXTENDED RELEASE ORAL EVERY 12 HOURS SCHEDULED
Status: DISCONTINUED | OUTPATIENT
Start: 2022-10-21 | End: 2022-10-21 | Stop reason: HOSPADM

## 2022-10-21 RX ORDER — CALCIUM ACETATE 667 MG/1
667 CAPSULE ORAL
Status: DISCONTINUED | OUTPATIENT
Start: 2022-10-21 | End: 2022-10-21 | Stop reason: HOSPADM

## 2022-10-21 RX ADMIN — HEPARIN SODIUM 5000 UNITS: 5000 INJECTION INTRAVENOUS; SUBCUTANEOUS at 14:02

## 2022-10-21 RX ADMIN — HEPARIN SODIUM 5000 UNITS: 5000 INJECTION INTRAVENOUS; SUBCUTANEOUS at 05:35

## 2022-10-21 RX ADMIN — INSULIN LISPRO 1 UNITS: 100 INJECTION, SOLUTION INTRAVENOUS; SUBCUTANEOUS at 09:18

## 2022-10-21 RX ADMIN — CLOPIDOGREL BISULFATE 75 MG: 75 TABLET ORAL at 09:23

## 2022-10-21 RX ADMIN — ASPIRIN 81 MG: 81 TABLET, COATED ORAL at 09:23

## 2022-10-21 RX ADMIN — INSULIN GLARGINE 14 UNITS: 100 INJECTION, SOLUTION SUBCUTANEOUS at 09:24

## 2022-10-21 RX ADMIN — CALCIUM ACETATE 667 MG: 667 CAPSULE ORAL at 09:25

## 2022-10-21 RX ADMIN — ASPIRIN 162 MG: 81 TABLET, CHEWABLE ORAL at 02:00

## 2022-10-21 RX ADMIN — GUAIFENESIN 600 MG: 600 TABLET ORAL at 09:23

## 2022-10-21 NOTE — ASSESSMENT & PLAN NOTE
· Has frequent chest pain, frequent visits in the ED  · Follows up with cardiology outpatient, CAD history s/p stent placement  · POA troponins 149 -> 149 -> ?  · ECG unremarkable compared to before  · Patient mentions that the chest pain is not different from his baseline  · Troponins most likely elevated in the setting of CKD    Plan:  · Continue Aspirin, Statin, Coreg  · F/u with the last troponin  · Monitor on Telemetry

## 2022-10-21 NOTE — PLAN OF CARE
Problem: Nutrition/Hydration-ADULT  Goal: Nutrient/Hydration intake appropriate for improving, restoring or maintaining nutritional needs  Description: Monitor and assess patient's nutrition/hydration status for malnutrition  Collaborate with interdisciplinary team and initiate plan and interventions as ordered  Monitor patient's weight and dietary intake as ordered or per policy  Utilize nutrition screening tool and intervene as necessary  Determine patient's food preferences and provide high-protein, high-caloric foods as appropriate       INTERVENTIONS:  - Monitor oral intake, urinary output, labs, and treatment plans  - Assess nutrition and hydration status and recommend course of action  - Evaluate amount of meals eaten  - Assist patient with eating if necessary   - Allow adequate time for meals  - Recommend/ encourage appropriate diets, oral nutritional supplements, and vitamin/mineral supplements  - Order, calculate, and assess calorie counts as needed  - Recommend, monitor, and adjust tube feedings and TPN/PPN based on assessed needs  - Assess need for intravenous fluids  - Provide specific nutrition/hydration education as appropriate  - Include patient/family/caregiver in decisions related to nutrition  Outcome: Progressing     Problem: MOBILITY - ADULT  Goal: Maintain or return to baseline ADL function  Description: INTERVENTIONS:  -  Assess patient's ability to carry out ADLs; assess patient's baseline for ADL function and identify physical deficits which impact ability to perform ADLs (bathing, care of mouth/teeth, toileting, grooming, dressing, etc )  - Assess/evaluate cause of self-care deficits   - Assess range of motion  - Assess patient's mobility; develop plan if impaired  - Assess patient's need for assistive devices and provide as appropriate  - Encourage maximum independence but intervene and supervise when necessary  - Involve family in performance of ADLs  - Assess for home care needs following discharge   - Consider OT consult to assist with ADL evaluation and planning for discharge  - Provide patient education as appropriate  Outcome: Progressing  Goal: Maintains/Returns to pre admission functional level  Description: INTERVENTIONS:  - Perform BMAT or MOVE assessment daily    - Set and communicate daily mobility goal to care team and patient/family/caregiver  - Collaborate with rehabilitation services on mobility goals if consulted  - Perform Range of Motion 3 times a day  - Reposition patient every 2 hours    - Dangle patient 3 times a day  - Stand patient 3 times a day  - Ambulate patient 3 times a day  - Out of bed to chair 3 times a day   - Out of bed for meals 3 times a day  - Out of bed for toileting  - Record patient progress and toleration of activity level   Outcome: Progressing     Problem: RESPIRATORY - ADULT  Goal: Achieves optimal ventilation and oxygenation  Description: INTERVENTIONS:  - Assess for changes in respiratory status  - Assess for changes in mentation and behavior  - Position to facilitate oxygenation and minimize respiratory effort  - Oxygen administered by appropriate delivery if ordered  - Initiate smoking cessation education as indicated  - Encourage broncho-pulmonary hygiene including cough, deep breathe, Incentive Spirometry  - Assess the need for suctioning and aspirate as needed  - Assess and instruct to report SOB or any respiratory difficulty  - Respiratory Therapy support as indicated  Outcome: Progressing     Problem: PAIN - ADULT  Goal: Verbalizes/displays adequate comfort level or baseline comfort level  Description: Interventions:  - Encourage patient to monitor pain and request assistance  - Assess pain using appropriate pain scale  - Administer analgesics based on type and severity of pain and evaluate response  - Implement non-pharmacological measures as appropriate and evaluate response  - Consider cultural and social influences on pain and pain management  - Notify physician/advanced practitioner if interventions unsuccessful or patient reports new pain  Outcome: Progressing     Problem: INFECTION - ADULT  Goal: Absence or prevention of progression during hospitalization  Description: INTERVENTIONS:  - Assess and monitor for signs and symptoms of infection  - Monitor lab/diagnostic results  - Monitor all insertion sites, i e  indwelling lines, tubes, and drains  - Monitor endotracheal if appropriate and nasal secretions for changes in amount and color  - [place] appropriate cooling/warming therapies per order  - Administer medications as ordered  - Instruct and encourage patient and family to use good hand hygiene technique  - Identify and instruct in appropriate isolation precautions for identified infection/condition  Outcome: Progressing     Problem: SAFETY ADULT  Goal: Patient will remain free of falls  Description: INTERVENTIONS:  - Educate patient/family on patient safety including physical limitations  - Instruct patient to call for assistance with activity   - Consult OT/PT to assist with strengthening/mobility   - Keep Call bell within reach  - Keep bed low and locked with side rails adjusted as appropriate  - Keep care items and personal belongings within reach  - Initiate and maintain comfort rounds  - Make Fall Risk Sign visible to staff  - Offer Toileting every 2 Hours, in advance of need  - Initiate/Maintain bed alarm  - Obtain necessary fall risk management equipment  - Apply yellow socks and bracelet for high fall risk patients  - Consider moving patient to room near nurses station  Outcome: Progressing     Problem: DISCHARGE PLANNING  Goal: Discharge to home or other facility with appropriate resources  Description: INTERVENTIONS:  - Identify barriers to discharge w/patient and caregiver  - Arrange for needed discharge resources and transportation as appropriate  - Identify discharge learning needs (meds, wound care, etc )  - Arrange for interpretive services to assist at discharge as needed  - Refer to Case Management Department for coordinating discharge planning if the patient needs post-hospital services based on physician/advanced practitioner order or complex needs related to functional status, cognitive ability, or social support system  Outcome: Progressing     Problem: Knowledge Deficit  Goal: Patient/family/caregiver demonstrates understanding of disease process, treatment plan, medications, and discharge instructions  Description: Complete learning assessment and assess knowledge base    Interventions:  - Provide teaching at level of understanding  - Provide teaching via preferred learning methods  Outcome: Progressing

## 2022-10-21 NOTE — ASSESSMENT & PLAN NOTE
· Has swollen left arm, two prominent bumps are appreciated  · Patient gets dialysis on that arm  · Swelling appeared after dialysis session on Wednesday  · Doesn't look infected, is not tender or red    Plan:   · Follow up with left upper extremity doppler

## 2022-10-21 NOTE — SEPSIS NOTE
Sepsis Note   Agnes Vasquez 58 y o  male MRN: [id number]  Unit/Bed#: ED-24 Encounter: [id number]       qSOFA     Row Name 10/20/22 2330 10/20/22 2329             Altered mental status GCS < 15 -- --       Respiratory Rate > / =22 -- 0       Systolic BP < / =452 0 0       Q Sofa Score 0 0                  Initial Sepsis Screening     Row Name 10/21/22 0200                Is the patient's history suggestive of a new or worsening infection? Yes (Proceed)  -AO        Suspected source of infection pneumonia  -AO        Are two or more of the following signs & symptoms of infection both present and new to the patient? No  -AO        Indicate SIRS criteria --        If the answer is yes to both questions, suspicion of sepsis is present --        If severe sepsis is present AND tissue hypoperfusion perists in the hour after fluid resuscitation or lactate > 4, the patient meets criteria for SEPTIC SHOCK --        Are any of the following organ dysfunction criteria present within 6 hours of suspected infection and SIRS criteria that are NOT considered to be chronic conditions?  --        Organ dysfunction --        Date of presentation of severe sepsis --        Time of presentation of severe sepsis --        Tissue hypoperfusion persists in the hour after crystalloid fluid administration, evidenced, by either: --        Was hypotension present within one hour of the conclusion of crystalloid fluid administration? --        Date of presentation of septic shock --        Time of presentation of septic shock --              User Key  (r) = Recorded By, (t) = Taken By, (c) = Cosigned By    [address] Name Provider Rickey Lindquist MD Physician

## 2022-10-21 NOTE — ASSESSMENT & PLAN NOTE
Lab Results   Component Value Date    EGFR 7 10/21/2022    EGFR 8 10/20/2022    EGFR 10 09/22/2022    CREATININE 6 92 (H) 10/21/2022    CREATININE 6 64 (H) 10/20/2022    CREATININE 5 29 (H) 09/22/2022     · Patient has ESRD on Dialysis  · Gets Dialysis on Mon, Wed, Fri  · Actually getting HD Tues/Thurs/Sat after confirmation  · Will go for HD tomorrow outpatient  · Consulted nephrology for Dialysis  · Continue Torsemide 50 mg once daily on non-dialysis days and Torsemide 10 mg daily on dialysis days

## 2022-10-21 NOTE — DISCHARGE SUMMARY
[facility]  Discharge- Anita Amita 1960, 58 y o  male MRN: [id number]  Unit/Bed#: S -01 Encounter: [id number]  Primary Care Provider: James Weathers DO   Date and time admitted to hospital: 10/20/2022 11:22 PM    * SOB (shortness of breath)  Assessment & Plan  · 2 day history of SOB on admission day  · Has sick contact - his son has URI symptoms  · POA Flu/RSV/Covid test negative  · CXR does not look changed compared to previous xrays on my read   No leukocytosis on CBC,   · Patient has cough but is not excavating phlegm  · Patient is on room air, saturating well, afebrile  · Likely the patient has a viral URI, less likely bacterial infection, doesn't look clinically fluid overloaded    Plan:  · Continue monitoring respiratory status  · Symptomatic management  · Tessalon perles for cough  · Guaifenesin  · Follow up with procalcitonin  · Resolved in the morning    ESRD on dialysis [facility])  Assessment & Plan  Lab Results   Component Value Date    EGFR 7 10/21/2022    EGFR 8 10/20/2022    EGFR 10 09/22/2022    CREATININE 6 92 (H) 10/21/2022    CREATININE 6 64 (H) 10/20/2022    CREATININE 5 29 (H) 09/22/2022     · Patient has ESRD on Dialysis  · Gets Dialysis on Mon, Wed, Fri  · Actually getting HD Tues/Thurs/Sat after confirmation  · Will go for HD tomorrow outpatient  · Consulted nephrology for Dialysis  · Continue Torsemide 50 mg once daily on non-dialysis days and Torsemide 10 mg daily on dialysis days    Type 2 diabetes mellitus with chronic kidney disease on chronic dialysis, with long-term current use of insulin [facility])  Assessment & Plan  Lab Results   Component Value Date    HGBA1C 6 0 (H) 07/25/2022       Recent Labs     10/21/22  0708 10/21/22  1057   POCGLU 164* 128       Blood Sugar Average: Last 72 hrs:  · (P) 146Is on Lantus 15 at home with 4 units of Humalog plus sliding scale  · Ordered Lantus and ISS  · Hypoglycemia protocol  · Accuchecks  · Carbohydrate restricted diet    Left arm swelling  Assessment & Plan  · Has swollen left arm, two prominent bumps are appreciated  · Patient gets dialysis on that arm  · Swelling appeared after dialysis session on Wednesday  · Doesn't look infected, is not tender or red    Plan:   · Follow up with left upper extremity doppler  · Findings stable      Chest pain  Assessment & Plan  · Has frequent chest pain, frequent visits in the ED  · Follows up with cardiology outpatient, CAD history s/p stent placement  · POA troponins 149 -> 149 -> ?  · ECG unremarkable compared to before  · Patient mentions that the chest pain is not different from his baseline  · Troponins most likely elevated in the setting of CKD    Plan:  · Continue Aspirin, Statin, Coreg  · F/u with the last troponin  · Monitor on Telemetry        Hypertension  Assessment & Plan  · Continue Torsemide and Carvedilol    Anxiety associated with depression  Assessment & Plan  · Continue Zoloft      Medical Problems             Resolved Problems  Date Reviewed: 10/21/2022   None               Discharging Resident: Lujean Barthel [address] [place] [address]  Discharging Attending: Sara Shearer MD  PCP: [address] [place] [address]  Admission Date:   Admission Orders (From admission, onward)     Ordered        10/21/22 0142  Place in Observation  Once                      Discharge Date: 10/21/22    Consultations During Hospital Stay:  · Nephrology    Procedures Performed:   · None    Significant Findings / Test Results:   · CXR:  Mild pulmonary venous congestion  · VAS hemodialysis left upper limb duplex: stable, open  Functional fistula  No evidence hematoma or abscess  Incidental Findings:   · None     Test Results Pending at Discharge (will require follow up):   · None     Outpatient Tests Requested:  · None    Complications:  None    Reason for Admission:  Shortness of breath    Hospital Course:   Wilder Rocha is a 58 y o  male patient with past medical history of end-stage renal disease on dialysis, CAD s/p recent stent placement, anxiety and hypertension who originally presented to the hospital on 10/20/2022 due to 2 days of shortness of breath  Other notes mention that he had a positive sick contact of his son that appears to be sick with URI, however patient says that he has not seen a son in about a week and denies any other sick contacts  Patient is negative for for COVID flu and RSV  Did not meet sepsis criteria  Patient's symptoms resolved by the morning  Patient would like to go home and is stable to go home  Fistula access investigated with duplex; patent  Will prescribe antitussives on discharge  Patient understands that he will need to go to his dialysis appointment tomorrow  Please see above list of diagnoses and related plan for additional information  Condition at Discharge: stable    Discharge Day Visit / Exam:   Subjective:  Patient tells me that his cough is resolved  Denies any more shortness of breath  Now wonders when he is able to go home  Seemed a little bit anxious about waiting for a kidney transplant  Says that he did go to his hemodialysis appointment yesterday  Tells me that he gets it on Tuesday Thursdays and Fridays, later confirmed to be actually Tuesday Thursdays and Saturdays  Vitals: Blood Pressure: 139/75 (10/21/22 1539)  Pulse: 77 (10/21/22 0708)  Temperature: 98 9 °F (37 2 °C) (10/21/22 0708)  Temp Source: Oral (10/21/22 0247)  Respirations: 16 (10/21/22 1539)  SpO2: 97 % (10/21/22 0708)     Exam:   Please refer to my quick note on day of discharge for physical exam findings    Discussion with Family: Updated  (wife) at bedside  Discharge instructions/Information to patient and family:   See after visit summary for information provided to patient and family  Provisions for Follow-Up Care:  See after visit summary for information related to follow-up care and any pertinent home health orders         Disposition: Home    Planned Readmission: No    Discharge Medications:  See after visit summary for reconciled discharge medications provided to patient and/or family        **Please Note: This note may have been constructed using a voice recognition system**

## 2022-10-21 NOTE — ASSESSMENT & PLAN NOTE
Lab Results   Component Value Date    EGFR 8 10/20/2022    EGFR 10 09/22/2022    EGFR 8 09/03/2022    CREATININE 6 64 (H) 10/20/2022    CREATININE 5 29 (H) 09/22/2022    CREATININE 6 67 (H) 09/03/2022     · Patient has ESRD on Dialysis  · Gets Dialysis on Mon, Wed, Fri  · Consulted nephrology for Dialysis  · Continue Torsemide 50 mg once daily on non-dialysis days and Torsemide 10 mg daily on dialysis days

## 2022-10-21 NOTE — ASSESSMENT & PLAN NOTE
Lab Results   Component Value Date    HGBA1C 6 0 (H) 07/25/2022       Recent Labs     10/21/22  0708 10/21/22  1057   POCGLU 164* 128       Blood Sugar Average: Last 72 hrs:  · (P) 146Is on Lantus 15 at home with 4 units of Humalog plus sliding scale  · Ordered Lantus and ISS  · Hypoglycemia protocol  · Accuchecks  · Carbohydrate restricted diet

## 2022-10-21 NOTE — ASSESSMENT & PLAN NOTE
Lab Results   Component Value Date    HGBA1C 6 0 (H) 07/25/2022       No results for input(s): POCGLU in the last 72 hours      Blood Sugar Average: Last 72 hrs:  · Is on Lantus 15 at home with 4 units of Humalog plus sliding scale  · Ordered Lantus and ISS  · Hypoglycemia protocol  · Accuchecks  · Carbohydrate restricted diet

## 2022-10-21 NOTE — ED PROVIDER NOTES
History  Chief Complaint   Patient presents with   • Chest Pain     Pt presents to the ED with c/o generalized chest pain, cough, and sob that started tonight  Cardiac hx of stent and pt had dialysis tx today     Patient is a 58year old male with productive cough and sob today  Had dialysis today  No chest pain  No fever  No N/V  No travel  Denies smoking  Was last seen in this ED on 9/22/22 for dizziness  [facility] website checked on this patient and last Rx filled was on 2/24/21 for ativan for 30 day supply  History provided by:  Patient and spouse   used: No    Chest Pain  Associated symptoms: cough and shortness of breath    Associated symptoms: no fever, no nausea and not vomiting        Prior to Admission Medications   Prescriptions Last Dose Informant Patient Reported? Taking? BD Pen Needle Adina U/F 32G X 4 MM MISC  Spouse/Significant Other No No   Sig: USE TO INJECT INSULIN 4 TIMES DAILY   Blood Glucose Monitoring Suppl (True Metrix Meter) w/Device KIT   No No   Sig: Use to test blood sugars 3 times daily   Blood Pressure Monitoring (BLOOD PRESSURE CUFF) MISC  Spouse/Significant Other No No   Sig: Use to check blood pressure before taking blood pressure medication and 1 hour after and follow instructions provided in discharge instructions based on the readings     Cholecalciferol (Vitamin D3) 1 25 MG (00224 UT) CAPS   No No   Sig: TAKE 1 CAPSULE BY MOUTH ONE TIME PER WEEK   Cinacalcet HCl (SENSIPAR PO)   Yes No   Sig: Take 60 mg by mouth 3 (three) times a week   Insulin Syringe-Needle U-100 (B-D INS SYRINGE 0 5CC/30GX1/2") 30G X 1/2" 0 5 ML MISC   No No   Sig: Inject once daily   Lancets Ultra Thin 30G MISC  Spouse/Significant Other No No   Sig: Use 3 times a day   Lantus 100 UNIT/ML subcutaneous injection   No No   Sig: INJECT 14 UNITS UNDER THE SKIN DAILY   ONETOUCH DELICA LANCETS 80B MISC  Spouse/Significant Other No No   Sig: by Does not apply route 3 (three) times a day Semaglutide,0 25 or 0 5MG/DOS, (Ozempic, 0 25 or 0 5 MG/DOSE,) 2 MG/1 5ML SOPN   No No   Sig: Inject 0 25 mg once a week   TORSEMIDE PO  Spouse/Significant Other Yes No   Sig: Take 50 mg by mouth Pt takes 50 mg daily on non- dialysis days: sat, sun, Tues, and thrusday  Pt takes 10 mg of torsemide daily on dialysis days m- w- f     aspirin (ECOTRIN LOW STRENGTH) 81 mg EC tablet  Spouse/Significant Other Yes No   Sig: Take 81 mg by mouth daily Resume on 8/14     atorvastatin (LIPITOR) 40 mg tablet   No No   Sig: TAKE 1 TABLET BY MOUTH DAILY WITH DINNER   calcium acetate (CALPHRON) 667 mg  Spouse/Significant Other Yes No   Sig: TAKE 2 TABLETS BY MOUTH THREE TIMES DAILY WITH MEALS   carvedilol (COREG) 12 5 mg tablet   No No   Sig: Take 1 tablet (12 5 mg total) by mouth 2 (two) times a day with meals   clopidogrel (PLAVIX) 75 mg tablet   No No   Sig: Take 1 tablet (75 mg total) by mouth in the morning     glucose blood (True Metrix Blood Glucose Test) test strip   No No   Sig: Use 1 each 3 (three) times a day Use as instructed   insulin lispro (HumaLOG KwikPen) 100 units/mL injection pen  Spouse/Significant Other No No   Sig: INJECT 4 UNITS 3 TIMES A DAY WITH MEALS PLUS SCALE (max daily dose 42 units)   isosorbide mononitrate (IMDUR) 30 mg 24 hr tablet   No No   Sig: Take 1 tablet (30 mg total) by mouth daily   meclizine (ANTIVERT) 25 mg tablet   No No   Sig: Take 1 tablet (25 mg total) by mouth every 8 (eight) hours as needed for dizziness   sertraline (ZOLOFT) 25 mg tablet   No No   Sig: Take 1 tablet (25 mg total) by mouth daily at bedtime   torsemide (DEMADEX) 100 mg tablet   Yes No   Sig: Take 50 mg by mouth daily   torsemide (DEMADEX) 100 mg tablet   No No   Sig: Take 1 tablet (100 mg total) by mouth daily for 2 doses      Facility-Administered Medications: None       Past Medical History:   Diagnosis Date   • Cerebrovascular accident (CVA) due to thrombosis of left middle cerebral artery ([facility] [address] ) 7/29/2018   • Chronic kidney disease    • Diabetes mellitus ([facility] )    • GERD (gastroesophageal reflux disease)    • Hypercholesteremia    • Hyperlipidemia    • Hypertension    • 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   • Neuropathy    • Obesity    • Osteomyelitis ([facility] )     last assessed 11/4/16   • PVC's (premature ventricular contractions)     sees cardiology Dr Patrica camargo   • Stroke [facility])     last weeof July 2018 [address]   • TIA (transient ischemic attack) 10/28/2018       Past Surgical History:   Procedure Laterality Date   • ABDOMINAL SURGERY     • CARDIAC CATHETERIZATION N/A 5/2/2022    Procedure: Cardiac Coronary Angiogram;  Surgeon: Shaneka Pelaez MD;  Location: [facility] CARDIAC CATH LAB; Service: Cardiology   • CARDIAC CATHETERIZATION N/A 5/2/2022    Procedure: Cardiac pci;  Surgeon: Shaneka Pelaez MD;  Location: [facility] CARDIAC CATH LAB; Service: Cardiology   • CHOLECYSTECTOMY      Percutaneous   • COLONOSCOPY     • CYSTOSCOPY     • OTHER SURGICAL HISTORY      "stimulator to control bowel movements"   • AK ESOPHAGOGASTRODUODENOSCOPY TRANSORAL DIAGNOSTIC N/A 9/27/2016    Procedure: ESOPHAGOGASTRODUODENOSCOPY (EGD); Surgeon: Mona Wyatt MD;  Location: [facility] GI LAB;   Service: Gastroenterology   • AK LAP,CHOLECYSTECTOMY N/A 2/29/2016    Procedure: LAPAROSCOPIC CHOLECYSTECTOMY ;  Surgeon: Sage Miller DO;  Location: [facility] Main OR;  Service: General   • ROTATOR CUFF REPAIR Right    • TOE AMPUTATION Right 10/28/2016    Procedure: 3RD TOE AMPUTATION ;  Surgeon: Jackson Dhillon DPM;  Location: [facility] Main OR;  Service:        Family History   Problem Relation Age of Onset   • Leukemia Mother    • Liver disease Mother    • Lung cancer Mother         heavy smoker - 3 ppd   • Heart disease Father    • Liver disease Father    • Multiple myeloma Sister    • Breast cancer Sister    • Urolithiasis Family    • Alcohol abuse Neg Hx    • Depression Neg Hx    • Drug abuse Neg Hx    • Substance Abuse Neg Hx    • Mental illness Neg Hx      I have reviewed and agree with the history as documented  E-Cigarette/Vaping   • E-Cigarette Use Never User      E-Cigarette/Vaping Substances   • Nicotine No    • THC No    • CBD No    • Flavoring No    • Other No    • Unknown No      Social History     Tobacco Use   • Smoking status: Never Smoker   • Smokeless tobacco: Never Used   Vaping Use   • Vaping Use: Never used   Substance Use Topics   • Alcohol use: Not Currently   • Drug use: No       Review of Systems   Constitutional: Negative for fever  Respiratory: Positive for cough and shortness of breath  Cardiovascular: Negative for chest pain  Gastrointestinal: Negative for nausea and vomiting  All other systems reviewed and are negative  Physical Exam  Physical Exam  Vitals and nursing note reviewed  Constitutional:       General: He is in acute distress (mild)  HENT:      Head: Normocephalic and atraumatic  Mouth/Throat:      Mouth: Mucous membranes are moist       Pharynx: Oropharynx is clear  Eyes:      General: No scleral icterus  Cardiovascular:      Rate and Rhythm: Normal rate and regular rhythm  Heart sounds: Normal heart sounds  No murmur heard  Pulmonary:      Effort: Pulmonary effort is normal  No respiratory distress  Breath sounds: Normal breath sounds  No stridor  No wheezing, rhonchi or rales  Abdominal:      General: Bowel sounds are normal       Palpations: Abdomen is soft  Tenderness: There is no abdominal tenderness  Musculoskeletal:         General: No deformity  Cervical back: Normal range of motion and neck supple  Right lower leg: No edema  Left lower leg: No edema  Skin:     General: Skin is warm and dry  Findings: No erythema or rash  Neurological:      Mental Status: He is alert and oriented to person, place, and time     Psychiatric:         Mood and Affect: Mood normal          Vital Signs  ED Triage Vitals [10/20/22 2329]   Temperature Pulse Respirations Blood Pressure SpO2   98 1 °F (36 7 °C) 77 20 108/78 98 %      Temp Source Heart Rate Source Patient Position - Orthostatic VS BP Location FiO2 (%)   Oral Monitor Lying Right arm --      Pain Score       --           Vitals:    10/21/22 0045 10/21/22 0100 10/21/22 0115 10/21/22 0130   BP:       Pulse: 73 77 77 73   Patient Position - Orthostatic VS:             Visual Acuity      ED Medications  Medications   albuterol (PROVENTIL HFA,VENTOLIN HFA) inhaler 2 puff (2 puffs Inhalation Given 10/20/22 2357)   aspirin chewable tablet 162 mg (162 mg Oral Given 10/21/22 0200)       Diagnostic Studies  Results Reviewed     Procedure Component Value Units Date/Time    Comprehensive metabolic panel [[id number]]  (Abnormal) Collected: 10/20/22 2351    Lab Status: Final result Specimen: Blood from Arm, Right Updated: 10/21/22 0103     Sodium 141 mmol/L      Potassium 4 0 mmol/L      Chloride 96 mmol/L      CO2 32 mmol/L      ANION GAP 13 mmol/L      BUN 35 mg/dL      Creatinine 6 64 mg/dL      Glucose 107 mg/dL      Calcium 9 7 mg/dL      AST 14 U/L      ALT 14 U/L      Alkaline Phosphatase 77 U/L      Total Protein 7 4 g/dL      Albumin 4 2 g/dL      Total Bilirubin 0 70 mg/dL      eGFR 8 ml/min/1 73sq m     Narrative:      Romel guidelines for Chronic Kidney Disease (CKD):   •  Stage 1 with normal or high GFR (GFR > 90 mL/min/1 73 square meters)  •  Stage 2 Mild CKD (GFR = 60-89 mL/min/1 73 square meters)  •  Stage 3A Moderate CKD (GFR = 45-59 mL/min/1 73 square meters)  •  Stage 3B Moderate CKD (GFR = 30-44 mL/min/1 73 square meters)  •  Stage 4 Severe CKD (GFR = 15-29 mL/min/1 73 square meters)  •  Stage 5 End Stage CKD (GFR <15 mL/min/1 73 square meters)  Note: GFR calculation is accurate only with a steady state creatinine    Lactic acid [[id number]]  (Normal) Collected: 10/20/22 2351    Lab Status: Final result Specimen: Blood from Arm, Right Updated: 10/21/22 0101     LACTIC ACID 0 9 mmol/L     Narrative:      Result may be elevated if tourniquet was used during collection  FLU/RSV/COVID - if FLU/RSV clinically relevant [[id number]]  (Normal) Collected: 10/20/22 0052    Lab Status: Final result Specimen: Nares from Nose Updated: 10/21/22 0047     SARS-CoV-2 Negative     INFLUENZA A PCR Negative     INFLUENZA B PCR Negative     RSV PCR Negative    Narrative:      FOR PEDIATRIC PATIENTS - copy/paste COVID Guidelines URL to browser: [email or web address]  [email or web address]    SARS-CoV-2 assay is a Nucleic Acid Amplification assay intended for the  qualitative detection of nucleic acid from SARS-CoV-2 in nasopharyngeal  swabs  Results are for the presumptive identification of SARS-CoV-2 RNA  Positive results are indicative of infection with SARS-CoV-2, the virus  causing COVID-19, but do not rule out bacterial infection or co-infection  with other viruses  Laboratories within the [place] and its  territories are required to report all positive results to the appropriate  public health authorities  Negative results do not preclude SARS-CoV-2  infection and should not be used as the sole basis for treatment or other  patient management decisions  Negative results must be combined with  clinical observations, patient history, and epidemiological information  This test has not been FDA cleared or approved  This test has been authorized by FDA under an Emergency Use Authorization  (EUA)  This test is only authorized for the duration of time the  declaration that circumstances exist justifying the authorization of the  emergency use of an in vitro diagnostic tests for detection of SARS-CoV-2  virus and/or diagnosis of COVID-19 infection under section 564(b)(1) of  the Act, 21 U  S C  134KEC-9(C)(6), unless the authorization is terminated  or revoked sooner  The test has been validated but independent review by FDA  and CLIA is pending      Test performed using [employer] GeneXpert: This RT-PCR assay targets N2,  a region unique to SARS-CoV-2  A conserved region in the E-gene was chosen  for pan-Sarbecovirus detection which includes SARS-CoV-2  According to CMS-2020-01-R, this platform meets the definition of high-throughput technology      HS Troponin 0hr (reflex protocol) [[id number]]  (Abnormal) Collected: 10/20/22 2351    Lab Status: Final result Specimen: Blood from Arm, Right Updated: 10/21/22 0038     hs TnI 0hr 149 ng/L     HS Troponin I 2hr [[id number]]     Lab Status: No result Specimen: Blood     HS Troponin I 4hr [[id number]]     Lab Status: No result Specimen: Blood     B-Type Natriuretic Peptide(BNP) [place], [place], [place] [facility] Only [[id number]]  (Abnormal) Collected: 10/20/22 2351    Lab Status: Final result Specimen: Blood from Arm, Right Updated: 10/21/22 0037      pg/mL     Protime-INR [[id number]]  (Normal) Collected: 10/20/22 2351    Lab Status: Final result Specimen: Blood from Arm, Right Updated: 10/21/22 0032     Protime 13 5 seconds      INR 1 01    APTT [[id number]]  (Normal) Collected: 10/20/22 2351    Lab Status: Final result Specimen: Blood from Arm, Right Updated: 10/21/22 0032     PTT 30 seconds     CBC and differential [[id number]]  (Abnormal) Collected: 10/20/22 2351    Lab Status: Final result Specimen: Blood from Arm, Right Updated: 10/21/22 0010     WBC 7 11 Thousand/uL      RBC 3 78 Million/uL      Hemoglobin 11 7 g/dL      Hematocrit 36 9 %      MCV 98 fL      MCH 31 0 pg      MCHC 31 7 g/dL      RDW 14 8 %      MPV 10 2 fL      Platelets 394 Thousands/uL      nRBC 0 /100 WBCs      Neutrophils Relative 62 %      Immat GRANS % 1 %      Lymphocytes Relative 23 %      Monocytes Relative 11 %      Eosinophils Relative 2 %      Basophils Relative 1 %      Neutrophils Absolute 4 51 Thousands/µL      Immature Grans Absolute 0 04 Thousand/uL      Lymphocytes Absolute 1 61 Thousands/µL      Monocytes Absolute 0 75 Thousand/µL      Eosinophils Absolute 0 16 Thousand/µL      Basophils Absolute 0 04 Thousands/µL     Blood culture #2 [[id number]] Collected: 10/20/22 2351    Lab Status: In process Specimen: Blood from Arm, Right Updated: 10/21/22 0006    Blood culture #1 [[id number]] Collected: 10/20/22 2351    Lab Status: In process Specimen: Blood from Arm, Right Updated: 10/21/22 0006                 XR chest 1 view portable   ED Interpretation by Eldon Ferrer MD (10/21 0111)   No infiltrate read by me  Procedures  ECG 12 Lead Documentation Only    Date/Time: 10/20/2022 11:42 PM  Performed by: Eldon Ferrer MD  Authorized by: Eldon Ferrer MD     Indications / Diagnosis:  Sob  ECG reviewed by me, the ED Provider: yes    Patient location:  ED  Previous ECG:     Previous ECG:  Compared to current    Comparison ECG info:  7/14/22    Similarity:  No change  Rate:     ECG rate:  74    ECG rate assessment: normal    Rhythm:     Rhythm: sinus rhythm    Ectopy:     Ectopy: none    QRS:     QRS axis:  Normal  Conduction:     Conduction: abnormal      Abnormal conduction: complete RBBB    ST segments:     ST segments:  Non-specific  T waves:     T waves: normal               ED Course  ED Course as of 10/21/22 0200   Fri Oct 21, 2022   0010 EKG d/w patient and wife with patient's permission  0131 Labs and CXR d/w patient  HEART Risk Score    Flowsheet Row Most Recent Value   Heart Score Risk Calculator    History 1 Filed at: 10/21/2022 0200   ECG 1 Filed at: 10/21/2022 0200   Age 1 Filed at: 10/21/2022 0200   Risk Factors 2 Filed at: 10/21/2022 0200   Troponin 2 Filed at: 10/21/2022 0200   HEART Score 7 Filed at: 10/21/2022 0200                     Initial Sepsis Screening     Row Name 10/21/22 0200                Is the patient's history suggestive of a new or worsening infection?  Yes (Proceed)  -AO        Suspected source of infection pneumonia  -AO        Are two or more of the following signs & symptoms of infection both present and new to the patient? No  -AO        Indicate SIRS criteria --        If the answer is yes to both questions, suspicion of sepsis is present --        If severe sepsis is present AND tissue hypoperfusion perists in the hour after fluid resuscitation or lactate > 4, the patient meets criteria for SEPTIC SHOCK --        Are any of the following organ dysfunction criteria present within 6 hours of suspected infection and SIRS criteria that are NOT considered to be chronic conditions? --        Organ dysfunction --        Date of presentation of severe sepsis --        Time of presentation of severe sepsis --        Tissue hypoperfusion persists in the hour after crystalloid fluid administration, evidenced, by either: --        Was hypotension present within one hour of the conclusion of crystalloid fluid administration? --        Date of presentation of septic shock --        Time of presentation of septic shock --              User Key  (r) = Recorded By, (t) = Taken By, (c) = Cosigned By    [address] Name Provider Rocco Carrasco MD Physician                              MDM  Number of Diagnoses or Management Options  Diagnosis management comments: DDX including but not limited to: pneumonia, bronchitis, pleural effusion, CHF, doubt PE; PTX, ACS, MI, asthma exacerbation, COPD exacerbation, COVID 23, anemia, metabolic abnormality, renal failure         Amount and/or Complexity of Data Reviewed  Clinical lab tests: ordered and reviewed  Tests in the radiology section of CPT®: ordered and reviewed  Decide to obtain previous medical records or to obtain history from someone other than the patient: yes  Obtain history from someone other than the patient: yes  Review and summarize past medical records: yes  Independent visualization of images, tracings, or specimens: yes        Disposition  Final diagnoses:   Bronchitis   Dyspnea   Elevated troponin   ESRD on hemodialysis ([facility] [address] ) Time reflects when diagnosis was documented in both MDM as applicable and the Disposition within this note     Time User Action Codes Description Comment    10/21/2022  1:31 AM Cherl Postal Add [J40] Bronchitis     10/21/2022  1:31 AM Cherl Postal Add [R06 00] Dyspnea     10/21/2022  1:31 AM Cherl Postal Add [R77 8] Elevated troponin     10/21/2022  1:31 AM Cherl Postal Add [N18 6,  Z99 2] ESRD on hemodialysis ([facility] [address] )     10/21/2022  1:32 AM Cherl Postal Modify [J40] Bronchitis     10/21/2022  1:32 AM Cherl Postal Modify [R77 8] Elevated troponin       ED Disposition     ED Disposition   Admit    Condition   Stable    Date/Time   Fri Oct 21, 2022  1:41 AM    Comment   Case was discussed with [facility] resident and the patient's admission status was agreed to be Admission Status: observation status to the service of Dr Emory Hunter   Follow-up Information    None         Patient's Medications   Discharge Prescriptions    No medications on file       No discharge procedures on file      PDMP Review       Value Time User    PDMP Reviewed  Yes 10/20/2022 11:34 PM Domenico Hsu MD          ED Provider  Electronically Signed by           Domenico Hsu MD  10/21/22 Fany Stephens MD  10/21/22 0202

## 2022-10-21 NOTE — QUICK NOTE
Patient lying comfortable chair  Tells me he has complete resolution of his coughing but coughs exam/lungs CTAB otherwise  Has questions about kidney transplant  Tells me that he used to get dialysis on Tuesday Thursday Fridays because that is when he wanted but is now changed to MWF  Patient tells me that the lumps on his left arm happened after he left yesterday's dialysis  Clarified that his son does have URI symptoms, but denies to me that he has seen him recently (last seen >1 week ago)  Denies any other sick contacts  Physical Exam  Vitals and nursing note reviewed  Constitutional:       General: He is not in acute distress  Appearance: Normal appearance  He is obese  HENT:      Head: Normocephalic and atraumatic  Right Ear: External ear normal       Left Ear: External ear normal       Nose: Nose normal       Mouth/Throat:      Mouth: Mucous membranes are moist       Pharynx: Oropharynx is clear  Eyes:      Conjunctiva/sclera: Conjunctivae normal    Cardiovascular:      Rate and Rhythm: Normal rate and regular rhythm  Pulses: Normal pulses  Heart sounds: Normal heart sounds  Pulmonary:      Effort: Pulmonary effort is normal       Breath sounds: Normal breath sounds  Comments: Coughing on exam  Abdominal:      General: Abdomen is flat  There is distension (normal per pt)  Palpations: Abdomen is soft  Tenderness: There is no abdominal tenderness  There is no guarding  Musculoskeletal:         General: Deformity (Left arm, 2 lumps - compressible, soft, nontender, nonerythematous  About tangerine-sized) present  No tenderness  Normal range of motion  Cervical back: Neck supple  Right lower leg: No edema  Left lower leg: No edema  Skin:     General: Skin is warm  Capillary Refill: Capillary refill takes less than 2 seconds  Findings: No rash  Neurological:      General: No focal deficit present        Mental Status: He is alert and oriented to person, place, and time     Psychiatric:         Mood and Affect: Mood normal          Behavior: Behavior normal       Comments: Emotional lability       Meghan Boggs  10/21/2022

## 2022-10-21 NOTE — ASSESSMENT & PLAN NOTE
· 2 day history of SOB on admission day  · Has sick contact - his son has URI symptoms  · POA Flu/RSV/Covid test negative  · CXR does not look changed compared to previous xrays on my read   No leukocytosis on CBC,   · Patient has cough but is not excavating phlegm  · Patient is on room air, saturating well, afebrile  · Likely the patient has a viral URI, less likely bacterial infection, doesn't look clinically fluid overloaded    Plan:  · Continue monitoring respiratory status  · Symptomatic management  · Tessalon perles for cough  · Guaifenesin  · Follow up with procalcitonin  · Resolved in the morning

## 2022-10-21 NOTE — ASSESSMENT & PLAN NOTE
· 2 day history of SOB on admission day  · Has sick contact - his son has URI symptoms  · POA Flu/RSV/Covid test negative  · CXR does not look changed compared to previous xrays on my read   No leukocytosis on CBC,   · Patient has cough but is not excavating phlegm  · Patient is on room air, saturating well, afebrile  · Likely the patient has a viral URI, less likely bacterial infection, doesn't look clinically fluid overloaded    Plan:  · Continue monitoring respiratory status  · Symptomatic management  · Tessalon perles for cough  · Guaifenesin  · Follow up with procalcitonin

## 2022-10-21 NOTE — ASSESSMENT & PLAN NOTE
· Has swollen left arm, two prominent bumps are appreciated  · Patient gets dialysis on that arm  · Swelling appeared after dialysis session on Wednesday  · Doesn't look infected, is not tender or red    Plan:   · Follow up with left upper extremity doppler  · Findings stable

## 2022-10-21 NOTE — DISCHARGE INSTR - AVS FIRST PAGE
Dear Scarlet Kearns,     It was our pleasure to care for you here at [address] [place] [address]  It is our hope that we were always able to exceed the expected standards for your care during your stay  You were hospitalized due to shortness of breath  You were cared for on the [facility] 4th floor by Michelle Castellanos under the service of Susana Cornelius MD with the [facility]ist [facility] who covers for your primary care physician (PCP), Clifford Alonso DO, while you were hospitalized  If you have any questions or concerns related to this hospitalization, you may contact us at [phone]  For follow up as well as any medication refills, we recommend that you follow up with your primary care physician  A registered nurse will reach out to you by phone within a few days after your discharge to answer any additional questions that you may have after going home  However, at this time we provide for you here, the most important instructions / recommendations at discharge:     Notable Medication Adjustments -   None  Testing Required after Discharge -   None  Important follow up information -   Go to all of your hemodialysis appointment  Other Instructions -   None  Please review this entire after visit summary as additional general instructions including medication list, appointments, activity, diet, any pertinent wound care, and other additional recommendations from your care team that may be provided for you        Sincerely,     Michelle Castellanos MD

## 2022-10-21 NOTE — H&P
[facility]  H&P- [place] Bullion 1960, 58 y o  male MRN: [id number]  Unit/Bed#: S -01 Encounter: [id number]  Primary Care Provider: Everett Parekh DO   Date and time admitted to hospital: 10/20/2022 11:22 PM    * SOB (shortness of breath)  Assessment & Plan  · 2 day history of SOB on admission day  · Has sick contact - his son has URI symptoms  · POA Flu/RSV/Covid test negative  · CXR does not look changed compared to previous xrays on my read   No leukocytosis on CBC,   · Patient has cough but is not excavating phlegm  · Patient is on room air, saturating well, afebrile  · Likely the patient has a viral URI, less likely bacterial infection, doesn't look clinically fluid overloaded    Plan:  · Continue monitoring respiratory status  · Symptomatic management  · Tessalon perles for cough  · Guaifenesin  · Follow up with procalcitonin    Left arm swelling  Assessment & Plan  · Has swollen left arm, two prominent bumps are appreciated  · Patient gets dialysis on that arm  · Swelling appeared after dialysis session on Wednesday  · Doesn't look infected, is not tender or red    Plan:   · Follow up with left upper extremity doppler      Chest pain  Assessment & Plan  · Has frequent chest pain, frequent visits in the ED  · Follows up with cardiology outpatient, CAD history s/p stent placement  · POA troponins 149 -> 149 -> ?  · ECG unremarkable compared to before  · Patient mentions that the chest pain is not different from his baseline  · Troponins most likely elevated in the setting of CKD    Plan:  · Continue Aspirin, Statin, Coreg  · F/u with the last troponin  · Monitor on Telemetry        Hypertension  Assessment & Plan  · Continue Torsemide and Carvedilol    ESRD on dialysis [facility])  Assessment & Plan  Lab Results   Component Value Date    EGFR 8 10/20/2022    EGFR 10 09/22/2022    EGFR 8 09/03/2022    CREATININE 6 64 (H) 10/20/2022    CREATININE 5 29 (H) 09/22/2022    CREATININE 6 67 (H) 09/03/2022     · Patient has ESRD on Dialysis  · Gets Dialysis on Mon, Wed, Fri  · Consulted nephrology for Dialysis  · Continue Torsemide 50 mg once daily on non-dialysis days and Torsemide 10 mg daily on dialysis days    Anxiety associated with depression  Assessment & Plan  · Continue Zoloft    Type 2 diabetes mellitus with chronic kidney disease on chronic dialysis, with long-term current use of insulin (HCC)  Assessment & Plan  Lab Results   Component Value Date    HGBA1C 6 0 (H) 07/25/2022       No results for input(s): POCGLU in the last 72 hours  Blood Sugar Average: Last 72 hrs:  · Is on Lantus 15 at home with 4 units of Humalog plus sliding scale  · Ordered Lantus and ISS  · Hypoglycemia protocol  · Accuchecks  · Carbohydrate restricted diet    VTE Pharmacologic Prophylaxis: VTE Score: 5 High Risk (Score >/= 5) - Pharmacological DVT Prophylaxis Ordered: heparin  Sequential Compression Devices Ordered  Code Status: Level 1 - Full Code   Discussion with family: Not at this time  Anticipated Length of Stay: Patient will be admitted on an observation basis with an anticipated length of stay of less than 2 midnights secondary to SOB  Chief Complaint: SOB    History of Present Illness:  Milly Reid is a 58 y o  male with a PMH of ESRD on dialysis, CAD s/p stent placement, anxiety, hypertension who presents with shortness of breath  The patient is complaining of shortness of breath for the last two days  He confirmed that he had a sick contact, his son has been sick with URI  For the last two days the patient has been having shortness of breath but denies any nasal congestion, phlegm excavation  POA flu/rsv/Covid was negative, didn't have leukocytosis on CBC,   CXR didn't look changed compared to before on my read  Patient is being admitted for further management and workup of the shortness of breath  For more information, see assessment and plan above      Review of Systems:  Review of Systems   Constitutional: Negative for chills and fever  HENT: Negative for ear pain and sore throat  Eyes: Negative for pain and visual disturbance  Respiratory: Positive for shortness of breath  Negative for cough and wheezing  Cardiovascular: Positive for chest pain  Negative for palpitations  Gastrointestinal: Negative for abdominal pain and vomiting  Genitourinary: Negative for dysuria and hematuria  Musculoskeletal: Negative for arthralgias and back pain  Skin: Negative for color change and rash  Neurological: Negative for seizures and syncope  All other systems reviewed and are negative  Past Medical and Surgical History:   Past Medical History:   Diagnosis Date   • Cerebrovascular accident (CVA) due to thrombosis of left middle cerebral artery ([facility] ) 7/29/2018   • Chronic kidney disease    • Diabetes mellitus ([place] [address] )    • GERD (gastroesophageal reflux disease)    • Hypercholesteremia    • Hyperlipidemia    • Hypertension    • 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   • Neuropathy    • Obesity    • Osteomyelitis ([facility] )     last assessed 11/4/16   • PVC's (premature ventricular contractions)     sees cardiology Dr Jennifer camargo   • Stroke [facility])     last weeof July 2018 [facility]   • TIA (transient ischemic attack) 10/28/2018       Past Surgical History:   Procedure Laterality Date   • ABDOMINAL SURGERY     • CARDIAC CATHETERIZATION N/A 5/2/2022    Procedure: Cardiac Coronary Angiogram;  Surgeon: Russell Staley MD;  [facility] CARD[facility]; Service: Cardiology   • CARDIAC CATHETERIZATION N/A 5/2/2022    Procedure: Cardiac pci;  Surgeon: Russell Staley MD;  [facility] CARDIAC [facility];   Service: Cardiology   • CHOLECYSTECTOMY      Percutaneous   • COLONOSCOPY     • CYSTOSCOPY     • OTHER SURGICAL HISTORY      "stimulator to control bowel movements"   • AR ESOPHAGOGASTRODUODENOSCOPY TRANSORAL DIAGNOSTIC N/A 9/27/2016    Procedure: ESOPHAGOGASTRODUODENOSCOPY (EGD); Surgeon: Angy Harris MD;  Location: [facility] GI LAB; Service: Gastroenterology   • OH LAP,CHOLECYSTECTOMY N/A 2/29/2016    Procedure: LAPAROSCOPIC CHOLECYSTECTOMY ;  Surgeon: Paul Og DO;  Location: [facility] Main OR;  Service: General   • ROTATOR CUFF REPAIR Right    • TOE AMPUTATION Right 10/28/2016    Procedure: 3RD TOE AMPUTATION ;  Surgeon: Marian Emmanuel DPM;  Location: [facility] Main OR;  Service:        Meds/Allergies:  Prior to Admission medications    Medication Sig Start Date End Date Taking? Authorizing Provider   aspirin (ECOTRIN LOW STRENGTH) 81 mg EC tablet Take 81 mg by mouth daily Resume on 8/14      Historical Provider, MD   atorvastatin (LIPITOR) 40 mg tablet TAKE 1 TABLET BY MOUTH DAILY WITH DINNER 6/8/22   Raj Wooten DO   BD Pen Needle Adina U/F 32G X 4 MM MISC USE TO INJECT INSULIN 4 TIMES DAILY 5/19/21   Lissette Brennan PA-C   Blood Glucose Monitoring Suppl (True Metrix Meter) w/Device KIT Use to test blood sugars 3 times daily 7/1/22   Tre Rosales MD   Blood Pressure Monitoring (BLOOD PRESSURE CUFF) MISC Use to check blood pressure before taking blood pressure medication and 1 hour after and follow instructions provided in discharge instructions based on the readings   8/13/18   Mtizy Manjarrez MD   calcium acetate (CALPHRON) 667 mg TAKE 2 TABLETS BY MOUTH THREE TIMES DAILY WITH MEALS 10/17/21   Historical Provider, MD   carvedilol (COREG) 12 5 mg tablet Take 1 tablet (12 5 mg total) by mouth 2 (two) times a day with meals 10/13/22 11/12/22  Lina Hickey MD   Cholecalciferol (Vitamin D3) 1 25 MG (82895 UT) CAPS TAKE 1 CAPSULE BY MOUTH ONE TIME PER WEEK 6/3/22   Fadumo Cohen MD   Cinacalcet HCl (SENSIPAR PO) Take 60 mg by mouth 3 (three) times a week 6/20/22 6/19/23  Historical Provider, MD   clopidogrel (PLAVIX) 75 mg tablet Take 1 tablet (75 mg total) by mouth in the morning  5/23/22   Lina Hickey MD   glucose blood (True Metrix Blood Glucose Test) test strip Use 1 each 3 (three) times a day Use as instructed 7/1/22   Margarita Wilks MD   insulin lispro (HumaLOG KwikPen) 100 units/mL injection pen INJECT 4 UNITS 3 TIMES A DAY WITH MEALS PLUS SCALE (max daily dose 42 units) 4/7/22   Margarita Wilks MD   Insulin Syringe-Needle U-100 (B-D INS SYRINGE 0 5CC/30GX1/2") 30G X 1/2" 0 5 ML MISC Inject once daily 7/1/22   Margarita Wilks MD   isosorbide mononitrate (IMDUR) 30 mg 24 hr tablet Take 1 tablet (30 mg total) by mouth daily 6/12/22 7/14/22  Elsy Frank MD   Lancets Ultra Thin 30G MISC Use 3 times a day 3/28/22   Lissette Brennan PA-C   Lantus 100 UNIT/ML subcutaneous injection INJECT 14 UNITS UNDER THE SKIN DAILY 8/8/22   Lissette Brennan PA-C   meclizine (ANTIVERT) 25 mg tablet Take 1 tablet (25 mg total) by mouth every 8 (eight) hours as needed for dizziness 9/22/22   Devi Diamond, DO   [facility] LANCETS 64X MISC by Does not apply route 3 (three) times a day 11/27/18   Isaias Arora, DO   Semaglutide,0 25 or 0 5MG/DOS, (Ozempic, 0 25 or 0 5 MG/DOSE,) 2 MG/1 5ML SOPN Inject 0 25 mg once a week 7/1/22   Margarita Wilks MD   sertraline (ZOLOFT) 25 mg tablet Take 1 tablet (25 mg total) by mouth daily at bedtime 6/16/22   Raj Auguste,    torsemide (DEMADEX) 100 mg tablet Take 50 mg by mouth daily 7/19/22   Historical Provider, MD   torsemide (DEMADEX) 100 mg tablet Take 1 tablet (100 mg total) by mouth daily for 2 doses 9/3/22 9/5/22  Rolly Portillo MD   TORSEMIDE PO Take 50 mg by mouth Pt takes 50 mg daily on non- dialysis days: sat, sun, Tues, and thrusday  Pt takes 10 mg of torsemide daily on dialysis days m- w- f   10/25/21   Historical Provider, MD     I have reviewed home medications using recent Epic encounter      Allergies: No Known Allergies    Social History:  Marital Status: /Civil Union   Occupation: Unknown  Patient Pre-hospital Living Situation: Home  Patient Pre-hospital Level of Mobility: walks  Patient Pre-hospital Diet Restrictions: Carb-restricted diet  Substance Use History:   Social History     Substance and Sexual Activity   Alcohol Use Not Currently     Social History     Tobacco Use   Smoking Status Never Smoker   Smokeless Tobacco Never Used     Social History     Substance and Sexual Activity   Drug Use No       Family History:  Family History   Problem Relation Age of Onset   • Leukemia Mother    • Liver disease Mother    • Lung cancer Mother         heavy smoker - 3 ppd   • Heart disease Father    • Liver disease Father    • Multiple myeloma Sister    • Breast cancer Sister    • Urolithiasis Family    • Alcohol abuse Neg Hx    • Depression Neg Hx    • Drug abuse Neg Hx    • Substance Abuse Neg Hx    • Mental illness Neg Hx        Physical Exam:     Vitals:   Blood Pressure: 123/79 (10/21/22 0247)  Pulse: 81 (10/21/22 0247)  Temperature: 99 7 °F (37 6 °C) (10/21/22 0247)  Temp Source: Oral (10/21/22 0247)  Respirations: 16 (10/21/22 0247)  SpO2: 98 % (10/21/22 0247)    Physical Exam  Vitals and nursing note reviewed  Constitutional:       Appearance: He is well-developed  HENT:      Head: Normocephalic and atraumatic  Eyes:      Conjunctiva/sclera: Conjunctivae normal    Cardiovascular:      Rate and Rhythm: Normal rate and regular rhythm  Heart sounds: No murmur heard  Pulmonary:      Effort: Pulmonary effort is normal  No respiratory distress  Breath sounds: Normal breath sounds  No wheezing  Abdominal:      Palpations: Abdomen is soft  Tenderness: There is no abdominal tenderness  Musculoskeletal:      Cervical back: Neck supple  Right lower leg: No edema  Left lower leg: No edema  Skin:     General: Skin is warm and dry  Neurological:      Mental Status: He is alert            Additional Data:     Lab Results:  Results from last 7 days   Lab Units 10/20/22  2351   WBC Thousand/uL 7 11   HEMOGLOBIN g/dL 11 7*   HEMATOCRIT % 36 9   PLATELETS Thousands/uL 160   NEUTROS PCT % 62 LYMPHS PCT % 23   MONOS PCT % 11   EOS PCT % 2     Results from last 7 days   Lab Units 10/20/22  2351   SODIUM mmol/L 141   POTASSIUM mmol/L 4 0   CHLORIDE mmol/L 96   CO2 mmol/L 32   BUN mg/dL 35*   CREATININE mg/dL 6 64*   ANION GAP mmol/L 13   CALCIUM mg/dL 9 7   ALBUMIN g/dL 4 2   TOTAL BILIRUBIN mg/dL 0 70   ALK PHOS U/L 77   ALT U/L 14   AST U/L 14   GLUCOSE RANDOM mg/dL 107     Results from last 7 days   Lab Units 10/20/22  2351   INR  1 01             Results from last 7 days   Lab Units 10/21/22  0402 10/20/22  2351   LACTIC ACID mmol/L  --  0 9   PROCALCITONIN ng/ml 0 41*  --        Imaging: Reviewed radiology reports from this admission including: chest xray  XR chest 1 view portable   ED Interpretation by Evangelina Mosher MD (10/21 0111)   No infiltrate read by me  VAS upper limb venous duplex scan, unilateral/limited    (Results Pending)       EKG and Other Studies Reviewed on Admission:   · EKG: NSR  HR 74     ** Please Note: This note has been constructed using a voice recognition system   **

## 2022-10-22 LAB
ATRIAL RATE: 74 BPM
MRSA NOSE QL CULT: NORMAL
P AXIS: 59 DEGREES
PR INTERVAL: 160 MS
QRS AXIS: -6 DEGREES
QRSD INTERVAL: 158 MS
QT INTERVAL: 474 MS
QTC INTERVAL: 526 MS
T WAVE AXIS: 39 DEGREES
VENTRICULAR RATE: 74 BPM

## 2022-10-22 PROCEDURE — 93010 ELECTROCARDIOGRAM REPORT: CPT | Performed by: INTERNAL MEDICINE

## 2022-10-22 NOTE — RESULT ENCOUNTER NOTE
Patient called at [phone]  Spoke with wife who was able to verify his   Discussed blood culture results  Patient feeling well at this time  Likely a contaminant  Discussed return to ER precautions

## 2022-10-23 LAB
BACTERIA BLD CULT: ABNORMAL
BACTERIA BLD CULT: NORMAL
GRAM STN SPEC: ABNORMAL
S EPIDERMIDIS DNA BLD POS QL NAA+NON-PRB: DETECTED

## 2022-10-24 ENCOUNTER — HOSPITAL ENCOUNTER (EMERGENCY)
Facility: HOSPITAL | Age: 62
Discharge: HOME | End: 2022-10-24
Attending: EMERGENCY MEDICINE
Payer: MEDICARE

## 2022-10-24 ENCOUNTER — TRANSITIONAL CARE MANAGEMENT (OUTPATIENT)
Dept: INTERNAL MEDICINE CLINIC | Facility: CLINIC | Age: 62
End: 2022-10-24

## 2022-10-24 ENCOUNTER — APPOINTMENT (EMERGENCY)
Dept: RADIOLOGY | Facility: HOSPITAL | Age: 62
End: 2022-10-24
Payer: MEDICARE

## 2022-10-24 ENCOUNTER — TELEPHONE (OUTPATIENT)
Dept: FAMILY MEDICINE CLINIC | Facility: CLINIC | Age: 62
End: 2022-10-24

## 2022-10-24 VITALS
SYSTOLIC BLOOD PRESSURE: 134 MMHG | OXYGEN SATURATION: 93 % | TEMPERATURE: 98.7 F | RESPIRATION RATE: 18 BRPM | HEART RATE: 75 BPM | DIASTOLIC BLOOD PRESSURE: 63 MMHG | WEIGHT: 228 LBS | BODY MASS INDEX: 33.77 KG/M2 | HEIGHT: 69 IN

## 2022-10-24 DIAGNOSIS — R05.9 COUGH: Primary | ICD-10-CM

## 2022-10-24 DIAGNOSIS — Z79.4 TYPE 2 DIABETES MELLITUS WITH HYPERGLYCEMIA, WITH LONG-TERM CURRENT USE OF INSULIN (HCC): ICD-10-CM

## 2022-10-24 DIAGNOSIS — E11.65 TYPE 2 DIABETES MELLITUS WITH HYPERGLYCEMIA, WITH LONG-TERM CURRENT USE OF INSULIN (HCC): ICD-10-CM

## 2022-10-24 LAB
ALBUMIN SERPL BCP-MCNC: 4.2 G/DL (ref 3.5–5)
ALP SERPL-CCNC: 86 U/L (ref 34–104)
ALT SERPL W P-5'-P-CCNC: 12 U/L (ref 7–52)
ANION GAP SERPL CALCULATED.3IONS-SCNC: 12 MMOL/L (ref 4–13)
AST SERPL W P-5'-P-CCNC: 11 U/L (ref 13–39)
ATRIAL RATE: 82 BPM
BACTERIA BLD CULT: ABNORMAL
BASOPHILS # BLD AUTO: 0.02 THOUSANDS/ÂΜL (ref 0–0.1)
BASOPHILS NFR BLD AUTO: 0 % (ref 0–1)
BILIRUB SERPL-MCNC: 0.61 MG/DL (ref 0.2–1)
BUN SERPL-MCNC: 51 MG/DL (ref 5–25)
CALCIUM SERPL-MCNC: 9.8 MG/DL (ref 8.4–10.2)
CARDIAC TROPONIN I PNL SERPL HS: 75 NG/L
CHLORIDE SERPL-SCNC: 95 MMOL/L (ref 96–108)
CO2 SERPL-SCNC: 31 MMOL/L (ref 21–32)
CREAT SERPL-MCNC: 8.12 MG/DL (ref 0.6–1.3)
EOSINOPHIL # BLD AUTO: 0.17 THOUSAND/ÂΜL (ref 0–0.61)
EOSINOPHIL NFR BLD AUTO: 2 % (ref 0–6)
ERYTHROCYTE [DISTWIDTH] IN BLOOD BY AUTOMATED COUNT: 14.6 % (ref 11.6–15.1)
GFR SERPL CREATININE-BSD FRML MDRD: 6 ML/MIN/1.73SQ M
GLUCOSE SERPL-MCNC: 132 MG/DL (ref 65–140)
GRAM STN SPEC: ABNORMAL
HCT VFR BLD AUTO: 36.1 % (ref 36.5–49.3)
HGB BLD-MCNC: 11.7 G/DL (ref 12–17)
IMM GRANULOCYTES # BLD AUTO: 0.05 THOUSAND/UL (ref 0–0.2)
IMM GRANULOCYTES NFR BLD AUTO: 1 % (ref 0–2)
LYMPHOCYTES # BLD AUTO: 1.44 THOUSANDS/ÂΜL (ref 0.6–4.47)
LYMPHOCYTES NFR BLD AUTO: 16 % (ref 14–44)
MCH RBC QN AUTO: 31.1 PG (ref 26.8–34.3)
MCHC RBC AUTO-ENTMCNC: 32.4 G/DL (ref 31.4–37.4)
MCV RBC AUTO: 96 FL (ref 82–98)
MONOCYTES # BLD AUTO: 0.9 THOUSAND/ÂΜL (ref 0.17–1.22)
MONOCYTES NFR BLD AUTO: 10 % (ref 4–12)
NEUTROPHILS # BLD AUTO: 6.23 THOUSANDS/ÂΜL (ref 1.85–7.62)
NEUTS SEG NFR BLD AUTO: 71 % (ref 43–75)
NRBC BLD AUTO-RTO: 0 /100 WBCS
P AXIS: 79 DEGREES
PLATELET # BLD AUTO: 140 THOUSANDS/UL (ref 149–390)
PMV BLD AUTO: 10.3 FL (ref 8.9–12.7)
POTASSIUM SERPL-SCNC: 4.3 MMOL/L (ref 3.5–5.3)
PR INTERVAL: 172 MS
PROT SERPL-MCNC: 7.5 G/DL (ref 6.4–8.4)
QRS AXIS: 54 DEGREES
QRSD INTERVAL: 158 MS
QT INTERVAL: 424 MS
QTC INTERVAL: 495 MS
RBC # BLD AUTO: 3.76 MILLION/UL (ref 3.88–5.62)
S EPIDERMIDIS DNA BLD POS QL NAA+NON-PRB: DETECTED
SODIUM SERPL-SCNC: 138 MMOL/L (ref 135–147)
T WAVE AXIS: 63 DEGREES
VENTRICULAR RATE: 82 BPM
WBC # BLD AUTO: 8.81 THOUSAND/UL (ref 4.31–10.16)

## 2022-10-24 PROCEDURE — 80053 COMPREHEN METABOLIC PANEL: CPT | Performed by: EMERGENCY MEDICINE

## 2022-10-24 PROCEDURE — 36415 COLL VENOUS BLD VENIPUNCTURE: CPT | Performed by: EMERGENCY MEDICINE

## 2022-10-24 PROCEDURE — 93010 ELECTROCARDIOGRAM REPORT: CPT | Performed by: INTERNAL MEDICINE

## 2022-10-24 PROCEDURE — 99285 EMERGENCY DEPT VISIT HI MDM: CPT | Performed by: EMERGENCY MEDICINE

## 2022-10-24 PROCEDURE — 84484 ASSAY OF TROPONIN QUANT: CPT | Performed by: EMERGENCY MEDICINE

## 2022-10-24 PROCEDURE — 71045 X-RAY EXAM CHEST 1 VIEW: CPT

## 2022-10-24 PROCEDURE — 93005 ELECTROCARDIOGRAM TRACING: CPT

## 2022-10-24 PROCEDURE — 85025 COMPLETE CBC W/AUTO DIFF WBC: CPT | Performed by: EMERGENCY MEDICINE

## 2022-10-24 RX ORDER — INSULIN GLARGINE 100 [IU]/ML
14 INJECTION, SOLUTION SUBCUTANEOUS DAILY
Qty: 10 ML | Refills: 0 | Status: SHIPPED | OUTPATIENT
Start: 2022-10-24

## 2022-10-24 RX ORDER — INSULIN LISPRO 100 [IU]/ML
INJECTION, SOLUTION INTRAVENOUS; SUBCUTANEOUS
Qty: 30 ML | Refills: 0 | Status: SHIPPED | OUTPATIENT
Start: 2022-10-24

## 2022-10-24 NOTE — TELEPHONE ENCOUNTER
Lauren son Sedrick Souza) asked if you could take a look at the test done in the ER last night and interpreted and call him

## 2022-10-24 NOTE — TELEPHONE ENCOUNTER
What test is he referring to    Eduardo Buenrostro had a CXR and blood work    He has some congestion on CXR which could be there for a variety of reasons including if he is cutting his dialysis treatments short    He may want to notify Asad's kidney dr about these results so they can address

## 2022-10-24 NOTE — DISCHARGE INSTRUCTIONS
-Please return to the ED for Shortness of breath, inability to breath, inability to drink/swallow, worsening symptoms

## 2022-10-24 NOTE — ED PROVIDER NOTES
History  Chief Complaint   Patient presents with   • Cough     "He's coughing really bad"     70-year-old male presents with history of renal failure on dialysis, diabetes, hypertension, HLD presents with cough  Patient states 6 day history progressively worsening cough that is worse while lying down and before dialysis, and relieved by sitting up and after dialysis  Patient states that he was previously seen 4 days ago for the same and was given benzoate, taking only 2 doses today without relief  History of renal failure, T/T/S, denies missing any dialysis appointments, next dialysis this morning at 6:00 a m     Wife states increased fluid intake  No known allergies  Denies alcohol, tobacco, recreational drug use  Prior to Admission Medications   Prescriptions Last Dose Informant Patient Reported? Taking? BD Pen Needle Adina U/F 32G X 4 MM MISC  Spouse/Significant Other No No   Sig: USE TO INJECT INSULIN 4 TIMES DAILY   Blood Glucose Monitoring Suppl (True Metrix Meter) w/Device KIT   No No   Sig: Use to test blood sugars 3 times daily   Blood Pressure Monitoring (BLOOD PRESSURE CUFF) MISC  Spouse/Significant Other No No   Sig: Use to check blood pressure before taking blood pressure medication and 1 hour after and follow instructions provided in discharge instructions based on the readings     Cholecalciferol (Vitamin D3) 1 25 MG (94663 UT) CAPS   No No   Sig: TAKE 1 CAPSULE BY MOUTH ONE TIME PER WEEK   Cinacalcet HCl (SENSIPAR PO)   Yes No   Sig: Take 60 mg by mouth 3 (three) times a week   Insulin Syringe-Needle U-100 (B-D INS SYRINGE 0 5CC/30GX1/2") 30G X 1/2" 0 5 ML MISC   No No   Sig: Inject once daily   Lancets Ultra Thin 30G MISC  Spouse/Significant Other No No   Sig: Use 3 times a day   Lantus 100 UNIT/ML subcutaneous injection   No No   Sig: INJECT 14 UNITS UNDER THE SKIN DAILY   ONETOUCH DELICA LANCETS 47G MISC  Spouse/Significant Other No No   Sig: by Does not apply route 3 (three) times a day   Semaglutide,0 25 or 0 5MG/DOS, (Ozempic, 0 25 or 0 5 MG/DOSE,) 2 MG/1 5ML SOPN   No No   Sig: Inject 0 25 mg once a week   TORSEMIDE PO  Spouse/Significant Other Yes No   Sig: Take 50 mg by mouth Pt takes 50 mg daily on non- dialysis days: sat, sun, Tues, and thrusday  Pt takes 10 mg of torsemide daily on dialysis days m- w- f     aspirin (ECOTRIN LOW STRENGTH) 81 mg EC tablet  Spouse/Significant Other Yes No   Sig: Take 81 mg by mouth daily Resume on 8/14     atorvastatin (LIPITOR) 40 mg tablet   No No   Sig: TAKE 1 TABLET BY MOUTH DAILY WITH DINNER   benzonatate (TESSALON PERLES) 100 mg capsule   No No   Sig: Take 1 capsule (100 mg total) by mouth 3 (three) times a day as needed for cough   calcium acetate (CALPHRON) 667 mg  Spouse/Significant Other Yes No   Sig: TAKE 2 TABLETS BY MOUTH THREE TIMES DAILY WITH MEALS   carvedilol (COREG) 12 5 mg tablet   No No   Sig: Take 1 tablet (12 5 mg total) by mouth 2 (two) times a day with meals   clopidogrel (PLAVIX) 75 mg tablet   No No   Sig: Take 1 tablet (75 mg total) by mouth in the morning     glucose blood (True Metrix Blood Glucose Test) test strip   No No   Sig: Use 1 each 3 (three) times a day Use as instructed   guaiFENesin (MUCINEX) 600 mg 12 hr tablet   No No   Sig: Take 1 tablet (600 mg total) by mouth every 12 (twelve) hours for 20 days   insulin lispro (HumaLOG KwikPen) 100 units/mL injection pen  Spouse/Significant Other No No   Sig: INJECT 4 UNITS 3 TIMES A DAY WITH MEALS PLUS SCALE (max daily dose 42 units)   isosorbide mononitrate (IMDUR) 30 mg 24 hr tablet   No No   Sig: Take 1 tablet (30 mg total) by mouth daily   meclizine (ANTIVERT) 25 mg tablet   No No   Sig: Take 1 tablet (25 mg total) by mouth every 8 (eight) hours as needed for dizziness   sertraline (ZOLOFT) 25 mg tablet   No No   Sig: Take 1 tablet (25 mg total) by mouth daily at bedtime   torsemide (DEMADEX) 100 mg tablet   Yes No   Sig: Take 50 mg by mouth daily   torsemide (DEMADEX) 100 mg tablet   No No   Sig: Take 1 tablet (100 mg total) by mouth daily for 2 doses      Facility-Administered Medications: None       Past Medical History:   Diagnosis Date   • Cerebrovascular accident (CVA) due to thrombosis of left middle cerebral artery ([facility] ) 7/29/2018   • Chronic kidney disease    • Diabetes mellitus ([facility] )    • GERD (gastroesophageal reflux disease)    • Hypercholesteremia    • Hyperlipidemia    • Hypertension    • 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   • Neuropathy    • Obesity    • Osteomyelitis ([facility] )     last assessed 11/4/16   • PVC's (premature ventricular contractions)     sees cardiology Dr Steffen camargo   • Stroke [facility])     last weeof July 2018 [facility]   • TIA (transient ischemic attack) 10/28/2018       Past Surgical History:   Procedure Laterality Date   • ABDOMINAL SURGERY     • CARDIAC CATHETERIZATION N/A 5/2/2022    Procedure: Cardiac Coronary Angiogram;  Surgeon: Cierra Enriquez MD;  [facility] CARD[facility]; Service: Cardiology   • CARDIAC CATHETERIZATION N/A 5/2/2022    Procedure: Cardiac pci;  Surgeon: Cierra Enriquez MD;  [facility] CARD[facility]AC [facility]; Service: Cardiology   • CHOLECYSTECTOMY      Percutaneous   • COLONOSCOPY     • CYSTOSCOPY     • OTHER SURGICAL HISTORY      "stimulator to control bowel movements"   • [place] ESOPHAGOGASTRODUODENOSCOPY TRANSORAL DIAGNOSTIC N/A 9/27/2016    Procedure: ESOPHAGOGASTRODUODENOSCOPY (EGD); Surgeon: Rosario Najjar, MD;  Location: [facility] GI LAB;   Service: Gastroenterology   • [place] LAP,CHOLECYSTECTOMY N/A 2/29/2016    Procedure: LAPAROSCOPIC CHOLECYSTECTOMY ;  Surgeon: Azul Melissa DO;  Location: [facility] Main OR;  Service: General   • ROTATOR CUFF REPAIR Right    • TOE AMPUTATION Right 10/28/2016    Procedure: 3RD TOE AMPUTATION ;  Surgeon: Luc Rosales DPM;  Location: [facility] Main OR;  Service:        Family History   Problem Relation Age of Onset   • Leukemia Mother    • Liver disease Mother    • Lung cancer Mother heavy smoker - 3 ppd   • Heart disease Father    • Liver disease Father    • Multiple myeloma Sister    • Breast cancer Sister    • Urolithiasis Family    • Alcohol abuse Neg Hx    • Depression Neg Hx    • Drug abuse Neg Hx    • Substance Abuse Neg Hx    • Mental illness Neg Hx      I have reviewed and agree with the history as documented  E-Cigarette/Vaping   • E-Cigarette Use Never User      E-Cigarette/Vaping Substances   • Nicotine No    • THC No    • CBD No    • Flavoring No    • Other No    • Unknown No      Social History     Tobacco Use   • Smoking status: Never Smoker   • Smokeless tobacco: Never Used   Vaping Use   • Vaping Use: Never used   Substance Use Topics   • Alcohol use: Not Currently   • Drug use: No        Review of Systems   Constitutional: Negative for appetite change, chills, diaphoresis, fatigue and fever  HENT: Positive for drooling  Negative for congestion, ear discharge, ear pain, hearing loss, postnasal drip, rhinorrhea, sinus pressure, sinus pain, sneezing, sore throat, tinnitus and voice change  Eyes: Negative for pain and visual disturbance  Respiratory: Positive for cough and choking  Negative for chest tightness, shortness of breath, wheezing and stridor  Cardiovascular: Negative for chest pain, palpitations and leg swelling  Gastrointestinal: Negative for abdominal pain, constipation, diarrhea, nausea and vomiting  Endocrine: Negative for polyuria  Genitourinary: Negative for decreased urine volume, dysuria, frequency and urgency  Still producing urine, no change     Musculoskeletal: Negative for neck pain and neck stiffness  Skin: Negative for color change, rash and wound  Neurological: Negative for tremors, seizures, facial asymmetry, speech difficulty, weakness, light-headedness, numbness and headaches  Psychiatric/Behavioral: Negative for confusion  All other systems reviewed and are negative        Physical Exam  ED Triage Vitals Temperature Pulse Respirations Blood Pressure SpO2   10/24/22 0124 10/24/22 0123 10/24/22 0123 10/24/22 0123 10/24/22 0123   98 7 °F (37 1 °C) 92 18 129/88 96 %      Temp Source Heart Rate Source Patient Position - Orthostatic VS BP Location FiO2 (%)   10/24/22 0123 10/24/22 0123 10/24/22 0123 10/24/22 0123 --   Oral Monitor Lying Right arm       Pain Score       10/24/22 0130       No Pain             Orthostatic Vital Signs  Vitals:    10/24/22 0123 10/24/22 0130 10/24/22 0300   BP: 129/88 126/77 134/63   Pulse: 92 90 75   Patient Position - Orthostatic VS: Lying Sitting Lying       Physical Exam  Vitals and nursing note reviewed  Constitutional:       General: He is in acute distress  Appearance: He is obese  He is not ill-appearing, toxic-appearing or diaphoretic  HENT:      Head: Normocephalic and atraumatic  Right Ear: Tympanic membrane, ear canal and external ear normal  There is no impacted cerumen  Left Ear: Tympanic membrane, ear canal and external ear normal  There is no impacted cerumen  Nose: Nose normal  No congestion or rhinorrhea  Mouth/Throat:      Mouth: Mucous membranes are moist       Pharynx: Oropharynx is clear  No oropharyngeal exudate or posterior oropharyngeal erythema  Comments: Patient mouth is noted to be welling with saliva  Patient was noted to choking on saliva during exam    Eyes:      General: No scleral icterus  Right eye: No discharge  Left eye: No discharge  Extraocular Movements: Extraocular movements intact  Conjunctiva/sclera: Conjunctivae normal       Pupils: Pupils are equal, round, and reactive to light  Cardiovascular:      Rate and Rhythm: Normal rate and regular rhythm  Pulses: Normal pulses  Heart sounds: Normal heart sounds  Pulmonary:      Effort: Pulmonary effort is normal  No respiratory distress  Breath sounds: Normal breath sounds  No stridor  No wheezing, rhonchi or rales     Abdominal: General: There is no distension  Palpations: Abdomen is soft  There is no mass  Tenderness: There is no abdominal tenderness  There is no right CVA tenderness, left CVA tenderness, guarding or rebound  Musculoskeletal:         General: No swelling, tenderness, deformity or signs of injury  Normal range of motion  Cervical back: Normal range of motion and neck supple  No rigidity or tenderness  Right lower leg: No edema  Left lower leg: No edema  Comments: Left brachiocephalic fistula, bruit noted, no erythema over site, no tenderness over site, no exudates over site  Lymphadenopathy:      Cervical: No cervical adenopathy  Skin:     General: Skin is warm and dry  Capillary Refill: Capillary refill takes less than 2 seconds  Coloration: Skin is not jaundiced or pale  Findings: No bruising, erythema, lesion or rash  Neurological:      General: No focal deficit present  Mental Status: He is alert and oriented to person, place, and time  Cranial Nerves: No cranial nerve deficit  Sensory: No sensory deficit  Motor: No weakness        Coordination: Coordination normal    Psychiatric:         Mood and Affect: Mood normal          Behavior: Behavior normal          ED Medications  Medications - No data to display    Diagnostic Studies  Results Reviewed     Procedure Component Value Units Date/Time    HS Troponin 0hr (reflex protocol) [[id number]]  (Abnormal) Collected: 10/24/22 0125    Lab Status: Final result Specimen: Blood from Arm, Right Updated: 10/24/22 0221     hs TnI 0hr 75 ng/L     Comprehensive metabolic panel [[id number]]  (Abnormal) Collected: 10/24/22 0125    Lab Status: Final result Specimen: Blood from Arm, Right Updated: 10/24/22 0213     Sodium 138 mmol/L      Potassium 4 3 mmol/L      Chloride 95 mmol/L      CO2 31 mmol/L      ANION GAP 12 mmol/L      BUN 51 mg/dL      Creatinine 8 12 mg/dL      Glucose 132 mg/dL      Calcium 9 8 mg/dL      AST 11 U/L      ALT 12 U/L      Alkaline Phosphatase 86 U/L      Total Protein 7 5 g/dL      Albumin 4 2 g/dL      Total Bilirubin 0 61 mg/dL      eGFR 6 ml/min/1 73sq m     Narrative:      National Kidney Disease Foundation guidelines for Chronic Kidney Disease (CKD):   •  Stage 1 with normal or high GFR (GFR > 90 mL/min/1 73 square meters)  •  Stage 2 Mild CKD (GFR = 60-89 mL/min/1 73 square meters)  •  Stage 3A Moderate CKD (GFR = 45-59 mL/min/1 73 square meters)  •  Stage 3B Moderate CKD (GFR = 30-44 mL/min/1 73 square meters)  •  Stage 4 Severe CKD (GFR = 15-29 mL/min/1 73 square meters)  •  Stage 5 End Stage CKD (GFR <15 mL/min/1 73 square meters)  Note: GFR calculation is accurate only with a steady state creatinine    CBC and differential [[id number]]  (Abnormal) Collected: 10/24/22 0125    Lab Status: Final result Specimen: Blood from Arm, Right Updated: 10/24/22 0148     WBC 8 81 Thousand/uL      RBC 3 76 Million/uL      Hemoglobin 11 7 g/dL      Hematocrit 36 1 %      MCV 96 fL      MCH 31 1 pg      MCHC 32 4 g/dL      RDW 14 6 %      MPV 10 3 fL      Platelets 334 Thousands/uL      nRBC 0 /100 WBCs      Neutrophils Relative 71 %      Immat GRANS % 1 %      Lymphocytes Relative 16 %      Monocytes Relative 10 %      Eosinophils Relative 2 %      Basophils Relative 0 %      Neutrophils Absolute 6 23 Thousands/µL      Immature Grans Absolute 0 05 Thousand/uL      Lymphocytes Absolute 1 44 Thousands/µL      Monocytes Absolute 0 90 Thousand/µL      Eosinophils Absolute 0 17 Thousand/µL      Basophils Absolute 0 02 Thousands/µL                  XR chest 1 view portable    (Results Pending)         Procedures  ECG 12 Lead Documentation Only    Date/Time: 10/24/2022 7:51 AM  Performed by: Melly Llanes MD  Authorized by: Melly Llanes MD       EKG October 24, 2022 at 1:29 a m  normal sinus rhythm, rate of 82, normal CA interval, wide QRS interval, normal QT interval, , ST depressions noted in 2, 3, AVF, V4, V5, V6  Unchanged from previous EKG on October 20, 2022  ED Course  ED Course as of 10/24/22 0755   Mon Oct 24, 2022   0225 Creatinine(!): 8 12   0226 BUN(!): 51   0226 hs TnI 0hr(!): 75                             SBIRT 20yo+    Flowsheet Row Most Recent Value   SBIRT (25 yo +)    In order to provide better care to our patients, we are screening all of our patients for alcohol and drug use  Would it be okay to ask you these screening questions? Unable to answer at this time Filed at: 10/24/2022 0137                MDM  Number of Diagnoses or Management Options  Cough  Diagnosis management comments: 58-year-old male presents with cough  Recent history of the same, treated with benzoate, no relief  Actively coughing while laying down, relieved by sitting up, nontoxic, non-ill appearing, vital signs within normal limits, cardiac exam within normal limits, lungs clear  Differential includes but not limited to ACS, URI, viral illness, COPD, COPD exacerbation, pneumonia  EKG shows no STEMI  Unchanged from previous EKG  Troponin improving from previous troponin  Chest x-ray unchanged from previous chest x-ray no acute cardiopulmonary process noted  Patient noted to be choking on his own saliva, patient advise likely cause of issue, advised fluid restriction considering increase fluid intake, follow-up with Nephrology, continue dialysis  Discharged home with dialysis today, strict return precautions given         Amount and/or Complexity of Data Reviewed  Clinical lab tests: ordered and reviewed  Tests in the radiology section of CPT®: ordered and reviewed  Tests in the medicine section of CPT®: ordered and reviewed  Independent visualization of images, tracings, or specimens: yes        Disposition  Final diagnoses:   Cough     Time reflects when diagnosis was documented in both MDM as applicable and the Disposition within this note     Time User Action Codes Description Comment 10/24/2022  3:23 AM Annia Black Add [R05 9] Cough       ED Disposition     ED Disposition   Discharge    Condition   Stable    Date/Time   Mon Oct 24, 2022  3:23 AM    Comment   90Brendan Clint [facility] discharge to home/self care  Follow-up Information     Follow up With Specialties Details Why Contact Info Additional [address] [place] [address], DO Internal Medicine Schedule an appointment as soon as possible for a visit in 2 days  [address]  [phone]       [facility] Emergency Department Emergency Medicine Go to  If symptoms worsen [address] [facility] [id number] [place] [address] Emergency Department, [address], Jacintarachel Costello, [facility], [address]          Discharge Medication List as of 10/24/2022  3:26 AM      CONTINUE these medications which have NOT CHANGED    Details   benzonatate (TESSALON PERLES) 100 mg capsule Take 1 capsule (100 mg total) by mouth 3 (three) times a day as needed for cough, Starting Fri 10/21/2022, Normal      guaiFENesin (MUCINEX) 600 mg 12 hr tablet Take 1 tablet (600 mg total) by mouth every 12 (twelve) hours for 20 days, Starting Fri 10/21/2022, Until Thu 11/10/2022, Normal      aspirin (ECOTRIN LOW STRENGTH) 81 mg EC tablet Take 81 mg by mouth daily Resume on 8/14  , Historical Med      atorvastatin (LIPITOR) 40 mg tablet TAKE 1 TABLET BY MOUTH DAILY WITH DINNER, Normal      BD Pen Needle Adina U/F 32G X 4 MM MISC USE TO INJECT INSULIN 4 TIMES DAILY, Normal      Blood Glucose Monitoring Suppl (True Metrix Meter) w/Device KIT Use to test blood sugars 3 times daily, Normal      Blood Pressure Monitoring (BLOOD PRESSURE CUFF) MISC Use to check blood pressure before taking blood pressure medication and 1 hour after and follow instructions provided in discharge instructions based on the readings  , Print      calcium acetate (CALPHRON) 667 mg TAKE 2 TABLETS BY MOUTH THREE TIMES DAILY WITH MEALS, Historical Med      carvedilol (COREG) 12 5 mg tablet Take 1 tablet (12 5 mg total) by mouth 2 (two) times a day with meals, Starting Thu 10/13/2022, Until Sat 11/12/2022, Normal      Cholecalciferol (Vitamin D3) 1 25 MG (65826 UT) CAPS TAKE 1 CAPSULE BY MOUTH ONE TIME PER WEEK, Normal      Cinacalcet HCl (SENSIPAR PO) Take 60 mg by mouth 3 (three) times a week, Starting Mon 6/20/2022, Until Mon 6/19/2023, Historical Med      clopidogrel (PLAVIX) 75 mg tablet Take 1 tablet (75 mg total) by mouth in the morning , Starting Mon 5/23/2022, Normal      glucose blood (True Metrix Blood Glucose Test) test strip Use 1 each 3 (three) times a day Use as instructed, Starting Fri 7/1/2022, Normal      insulin lispro (HumaLOG KwikPen) 100 units/mL injection pen INJECT 4 UNITS 3 TIMES A DAY WITH MEALS PLUS SCALE (max daily dose 42 units), Normal      Insulin Syringe-Needle U-100 (B-D INS SYRINGE 0 5CC/30GX1/2") 30G X 1/2" 0 5 ML MISC Inject once daily, Normal      isosorbide mononitrate (IMDUR) 30 mg 24 hr tablet Take 1 tablet (30 mg total) by mouth daily, Starting Sun 6/12/2022, Until Thu 7/14/2022, Normal      !!  Lancets Ultra Thin 30G MISC Use 3 times a day, Normal      Lantus 100 UNIT/ML subcutaneous injection INJECT 14 UNITS UNDER THE SKIN DAILY, Starting Mon 8/8/2022, Normal      meclizine (ANTIVERT) 25 mg tablet Take 1 tablet (25 mg total) by mouth every 8 (eight) hours as needed for dizziness, Starting Thu 9/22/2022, Normal      !! ONETOUCH DELICA LANCETS 03B MISC by Does not apply route 3 (three) times a day, Starting Tue 11/27/2018, Normal      Semaglutide,0 25 or 0 5MG/DOS, (Ozempic, 0 25 or 0 5 MG/DOSE,) 2 MG/1 5ML SOPN Inject 0 25 mg once a week, Normal      sertraline (ZOLOFT) 25 mg tablet Take 1 tablet (25 mg total) by mouth daily at bedtime, Starting Thu 6/16/2022, Normal      !! torsemide (DEMADEX) 100 mg tablet Take 50 mg by mouth daily, Starting Tue 7/19/2022, Historical Med      !! TORSEMIDE PO Take 50 mg by mouth Pt takes 50 mg daily on non- dialysis days: sat, sun, Tues, and thrusday  Pt takes 10 mg of torsemide daily on dialysis days m- w- f  , Starting Mon 10/25/2021, Historical Med       !! - Potential duplicate medications found  Please discuss with provider  No discharge procedures on file  PDMP Review       Value Time User    PDMP Reviewed  Yes 10/20/2022 11:34 PM Dae Seals MD           ED Provider  Attending physically available and evaluated Grant Ribera I managed the patient along with the ED Attending      Electronically Signed by         Amy Strickland MD  10/24/22 4000

## 2022-10-24 NOTE — ED ATTENDING ATTESTATION
10/24/2022  I, Christina Sifuentes MD, saw and evaluated the patient  I have discussed the patient with the resident/non-physician practitioner and agree with the resident's/non-physician practitioner's findings, Plan of Care, and MDM as documented in the resident's/non-physician practitioner's note, except where noted  All available labs and Radiology studies were reviewed  I was present for key portions of any procedure(s) performed by the resident/non-physician practitioner and I was immediately available to provide assistance  At this point I agree with the current assessment done in the Emergency Department  I have conducted an independent evaluation of this patient a history and physical is as follows:    ED Course         Critical Care Time  Procedures    Patient is a pleasant 58 yom who presents with a cough  On dialysis  Notes a week of cough  Given tessalon pearls with on improvement  Cough is worse with laying down  Lungs CTAB  No focal neuro symptoms  Patient appears well  MDM pleasant well appearing 58 yom, well appearing, workup reassuring, no sob, will dc, followup

## 2022-10-26 LAB — BACTERIA BLD CULT: NORMAL

## 2022-10-27 ENCOUNTER — OFFICE VISIT (OUTPATIENT)
Dept: PODIATRY | Facility: CLINIC | Age: 62
End: 2022-10-27
Payer: MEDICARE

## 2022-10-27 VITALS
SYSTOLIC BLOOD PRESSURE: 110 MMHG | DIASTOLIC BLOOD PRESSURE: 58 MMHG | WEIGHT: 224.4 LBS | BODY MASS INDEX: 33.24 KG/M2 | HEIGHT: 69 IN

## 2022-10-27 DIAGNOSIS — B35.1 ONYCHOMYCOSIS: ICD-10-CM

## 2022-10-27 DIAGNOSIS — E11.42 DIABETIC POLYNEUROPATHY ASSOCIATED WITH TYPE 2 DIABETES MELLITUS (HCC): ICD-10-CM

## 2022-10-27 DIAGNOSIS — S91.209A TRAUMATIC AVULSION OF NAIL PLATE OF TOE, INITIAL ENCOUNTER: Primary | ICD-10-CM

## 2022-10-27 PROCEDURE — 99212 OFFICE O/P EST SF 10 MIN: CPT | Performed by: PODIATRIST

## 2022-10-27 PROCEDURE — 11721 DEBRIDE NAIL 6 OR MORE: CPT | Performed by: PODIATRIST

## 2022-10-27 NOTE — PROGRESS NOTES
Assessment/Plan:    The remaining portion of the right 4th toenail plate was debrided with a parastomal nail clippers without incident  No bleeding was noted  Betadine dressing was applied to the digit  Recommend application of a topical antibiotic ointment with a dry Band-Aid for another 7-10 days then leave open to air  The remaining pedal nail plates were sharply debrided with the parastomal nail clippers without incident  They were mechanically reduced in thickness ingrowth of the rotary bur  No other acute pedal issues noted today  Follow-up in 2-3 months  Diagnoses and all orders for this visit:    Traumatic avulsion of nail plate of toe, initial encounter    Onychomycosis    Diabetic polyneuropathy associated with type 2 diabetes mellitus ([place] [address] )          Subjective:      Patient ID: Vijay Giron is a 58 y o  male  The patient presents today with his son present in the exam room  His chief complaint is recent traumatic avulsion of his right 4th toenail that resulted in profuse bleeding as the patient is currently on blood thinners  They did eventually get the bleeding to stop and he presents today for follow-up  The patient can not recall any specific injury or trauma to his right 4th toenail  He has follow up with Dr Andrea Sanchez in the past for routine diabetic foot care  His toenails are elongated and dystrophic and thickened  He does have a history of a partial right 3rd toe amputation        The following portions of the patient's history were reviewed and updated as appropriate: allergies, current medications, past family history, past medical history, past social history, past surgical history and problem list       PAST MEDICAL HISTORY:  Past Medical History:   Diagnosis Date   • Cerebrovascular accident (CVA) due to thrombosis of left middle cerebral artery ([place] [address] ) 7/29/2018   • Chronic kidney disease    • Diabetes mellitus ([facility] )    • GERD (gastroesophageal reflux disease)    • Hypercholesteremia    • Hyperlipidemia    • Hypertension    • 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   • Neuropathy    • Obesity    • Osteomyelitis ([address] )     last assessed 11/4/16   • PVC's (premature ventricular contractions)     sees cardiology Dr Rosibel camargo   • Stroke [facility])     last weeof July 2018 [address]   • TIA (transient ischemic attack) 10/28/2018       PAST SURGICAL HISTORY:  Past Surgical History:   Procedure Laterality Date   • ABDOMINAL SURGERY     • CARDIAC CATHETERIZATION N/A 5/2/2022    Procedure: Cardiac Coronary Angiogram;  Surgeon: Wyatt Lin MD;  [facility] CARDIAC CATH LAB; Service: Cardiology   • CARDIAC CATHETERIZATION N/A 5/2/2022    Procedure: Cardiac pci;  Surgeon: Wyatt Lin MD;  Location: [facility] CARDIAC CATH LAB; Service: Cardiology   • CHOLECYSTECTOMY      Percutaneous   • COLONOSCOPY     • CYSTOSCOPY     • OTHER SURGICAL HISTORY      "stimulator to control bowel movements"   • [place] ESOPHAGOGASTRODUODENOSCOPY TRANSORAL DIAGNOSTIC N/A 9/27/2016    Procedure: ESOPHAGOGASTRODUODENOSCOPY (EGD); Surgeon: Tracee Valera MD;  Location: [facility] GI LAB; Service: Gastroenterology   • [place] LAP,CHOLECYSTECTOMY N/A 2/29/2016    Procedure: LAPAROSCOPIC CHOLECYSTECTOMY ;  Surgeon: Matt Mendoza DO;  Location: [facility] Main OR;  Service: General   • ROTATOR CUFF REPAIR Right    • TOE AMPUTATION Right 10/28/2016    Procedure: 3RD TOE AMPUTATION ;  Surgeon: Zina Howard DPM;  Location: [facility] Main OR;  Service:         ALLERGIES:  Patient has no known allergies      MEDICATIONS:  Current Outpatient Medications   Medication Sig Dispense Refill   • aspirin (ECOTRIN LOW STRENGTH) 81 mg EC tablet Take 81 mg by mouth daily Resume on 8/14       • atorvastatin (LIPITOR) 40 mg tablet TAKE 1 TABLET BY MOUTH DAILY WITH DINNER 90 tablet 1   • BD Pen Needle Adina U/F 32G X 4 MM MISC USE TO INJECT INSULIN 4 TIMES DAILY 400 each 1   • benzonatate (TESSALON PERLES) 100 mg capsule Take 1 capsule (100 mg total) by mouth 3 (three) times a day as needed for cough 20 capsule 0   • Blood Glucose Monitoring Suppl (True Metrix Meter) w/Device KIT Use to test blood sugars 3 times daily 1 kit 0   • Blood Pressure Monitoring (BLOOD PRESSURE CUFF) MISC Use to check blood pressure before taking blood pressure medication and 1 hour after and follow instructions provided in discharge instructions based on the readings  1 each 0   • calcium acetate (CALPHRON) 667 mg TAKE 2 TABLETS BY MOUTH THREE TIMES DAILY WITH MEALS     • carvedilol (COREG) 12 5 mg tablet Take 1 tablet (12 5 mg total) by mouth 2 (two) times a day with meals 180 tablet 3   • Cholecalciferol (Vitamin D3) 1 25 MG (71786 UT) CAPS TAKE 1 CAPSULE BY MOUTH ONE TIME PER WEEK 12 capsule 2   • Cinacalcet HCl (SENSIPAR PO) Take 60 mg by mouth 3 (three) times a week     • clopidogrel (PLAVIX) 75 mg tablet Take 1 tablet (75 mg total) by mouth in the morning   90 tablet 2   • glucose blood (True Metrix Blood Glucose Test) test strip Use 1 each 3 (three) times a day Use as instructed 300 strip 1   • guaiFENesin (MUCINEX) 600 mg 12 hr tablet Take 1 tablet (600 mg total) by mouth every 12 (twelve) hours for 20 days 40 tablet 0   • insulin lispro (HumaLOG KwikPen) 100 units/mL injection pen INJECT 4 UNITS 3 TIMES A DAY WITH MEALS PLUS SCALE (max daily dose 42 units) 30 mL 0   • Insulin Syringe-Needle U-100 (B-D INS SYRINGE 0 5CC/30GX1/2") 30G X 1/2" 0 5 ML MISC Inject once daily 100 each 1   • Lancets Ultra Thin 30G MISC Use 3 times a day 300 each 0   • Lantus 100 UNIT/ML subcutaneous injection INJECT 14 UNITS UNDER THE SKIN DAILY 10 mL 0   • meclizine (ANTIVERT) 25 mg tablet Take 1 tablet (25 mg total) by mouth every 8 (eight) hours as needed for dizziness 30 tablet 0   • ONETOUCH DELICA LANCETS 83D MISC by Does not apply route 3 (three) times a day 270 each 1   • Semaglutide,0 25 or 0 5MG/DOS, (Ozempic, 0 25 or 0 5 MG/DOSE,) 2 MG/1 5ML SOPN Inject 0 25 mg once a week 3 mL 5 • sertraline (ZOLOFT) 25 mg tablet Take 1 tablet (25 mg total) by mouth daily at bedtime 90 tablet 1   • torsemide (DEMADEX) 100 mg tablet Take 50 mg by mouth daily     • TORSEMIDE PO Take 50 mg by mouth Pt takes 50 mg daily on non- dialysis days: sat, sun, Tues, and thrusday  Pt takes 10 mg of torsemide daily on dialysis days m- w- f       • isosorbide mononitrate (IMDUR) 30 mg 24 hr tablet Take 1 tablet (30 mg total) by mouth daily 30 tablet 0   • torsemide (DEMADEX) 100 mg tablet Take 1 tablet (100 mg total) by mouth daily for 2 doses 2 tablet 0     No current facility-administered medications for this visit  SOCIAL HISTORY:  Social History     Socioeconomic History   • Marital status: /Civil Union     Spouse name: None   • Number of children: None   • Years of education: None   • Highest education level: Not asked   Occupational History   • Occupation: disabled   Tobacco Use   • Smoking status: Never Smoker   • Smokeless tobacco: Never Used   Vaping Use   • Vaping Use: Never used   Substance and Sexual Activity   • Alcohol use: Not Currently   • Drug use: No   • Sexual activity: Not Currently   Other Topics Concern   • None   Social History Narrative    Daily caffeine consumption 2-3 servings a day     Social Determinants of Health     Financial Resource Strain: Not on file   Food Insecurity: Not on file   Transportation Needs: No Transportation Needs   • Lack of Transportation (Medical): No   • Lack of Transportation (Non-Medical): No   Physical Activity: Not on file   Stress: Not on file   Social Connections: Not on file   Intimate Partner Violence: Not on file   Housing Stability: Not on file        Review of Systems   Constitutional: Negative for chills and fever  HENT: Negative for ear pain and sore throat  Eyes: Negative for pain and visual disturbance  Respiratory: Negative for cough and shortness of breath  Cardiovascular: Negative for chest pain and palpitations  Gastrointestinal: Negative for abdominal pain and vomiting  Genitourinary: Negative for dysuria and hematuria  Musculoskeletal: Negative for arthralgias and back pain  Skin: Negative for color change and rash  Neurological: Negative for seizures and syncope  Psychiatric/Behavioral: Negative  All other systems reviewed and are negative  Objective:      /58   Ht 5' 9" (1 753 m)   Wt 102 kg (224 lb 6 4 oz)   BMI 33 14 kg/m²          Physical Exam  Vitals reviewed  Constitutional:       Appearance: Normal appearance  HENT:      Head: Normocephalic and atraumatic  Nose: Nose normal    Eyes:      Conjunctiva/sclera: Conjunctivae normal       Pupils: Pupils are equal, round, and reactive to light  Cardiovascular:      Pulses: Pulses are weak  Dorsalis pedis pulses are 1+ on the right side and 1+ on the left side  Posterior tibial pulses are 0 on the right side and 0 on the left side  Pulmonary:      Effort: Pulmonary effort is normal    Musculoskeletal:      Right Lower Extremity: (Partial right 3rd toe)  Feet:      Right foot:      Skin integrity: No skin breakdown or erythema  Toenail Condition: Right toenails are abnormally thick and long  Fungal disease present  Left foot:      Skin integrity: No skin breakdown or erythema  Toenail Condition: Left toenails are abnormally thick and long  Fungal disease present  Comments:  There is a traumatic avulsion of the right 4th toe nail without signs of infection; there is a lytic portion of the nail that is still attached to the proximal nail; the exposed nail bed appears stable without signs of infection; there is no tenderness to palpation likely secondary to the patient's peripheral neuropathy    The remaining pedal nail plates are clinically mycotic in nature and elongated and dystrophic    There are no open wounds nor pre trophic changes noted to the patient's foot bilaterally  Skin:     General: Skin is warm  Capillary Refill: Capillary refill takes less than 2 seconds  Neurological:      General: No focal deficit present  Mental Status: He is alert and oriented to person, place, and time  Psychiatric:         Mood and Affect: Mood normal          Behavior: Behavior normal          Thought Content: Thought content normal          Diabetic Foot Exam    Patient's shoes and socks removed  Right Foot/Ankle   Right Foot Inspection  Skin Exam: skin intact  No erythema and no maceration  Toe Exam: No swelling, no tenderness and erythema    Sensory   Vibration: diminished  Proprioception: intact  Monofilament testing: diminished    Vascular  Capillary refills: < 3 seconds  The right DP pulse is 1+  The right PT pulse is 0  Right Toe  - Comprehensive Exam  Ecchymosis: none  Arch: normal  Hammertoes: fifth toe  Claw Toes: absent  Swelling: none   Tenderness: none         Left Foot/Ankle  Left Foot Inspection  Skin Exam: skin intact  No erythema and no maceration  Toe Exam: No swelling, no tenderness and no erythema  Sensory   Vibration: diminished  Proprioception: intact  Monofilament testing: diminished    Vascular  Capillary refills: < 3 seconds  The left DP pulse is 1+  The left PT pulse is 0       Left Toe  - Comprehensive Exam  Ecchymosis: none  Arch: normal  Hammertoes: fifth toe  Claw toes: absent  Swelling: none   Tenderness: none           Assign Risk Category  Deformity present  Loss of protective sensation  Weak pulses  Risk: 2

## 2022-11-01 ENCOUNTER — OFFICE VISIT (OUTPATIENT)
Dept: PODIATRY | Facility: CLINIC | Age: 62
End: 2022-11-01

## 2022-11-01 VITALS
BODY MASS INDEX: 33.18 KG/M2 | DIASTOLIC BLOOD PRESSURE: 78 MMHG | WEIGHT: 224 LBS | HEIGHT: 69 IN | SYSTOLIC BLOOD PRESSURE: 110 MMHG

## 2022-11-01 DIAGNOSIS — S90.221D CONTUSION OF LESSER TOE OF RIGHT FOOT WITH DAMAGE TO NAIL, SUBSEQUENT ENCOUNTER: ICD-10-CM

## 2022-11-01 DIAGNOSIS — Z89.421 ABSENCE OF TOE OF RIGHT FOOT (HCC): ICD-10-CM

## 2022-11-01 DIAGNOSIS — E11.42 DIABETIC POLYNEUROPATHY ASSOCIATED WITH TYPE 2 DIABETES MELLITUS (HCC): Primary | ICD-10-CM

## 2022-11-01 RX ORDER — SODIUM ZIRCONIUM CYCLOSILICATE 10 G/10G
POWDER, FOR SUSPENSION ORAL
COMMUNITY
Start: 2022-07-29

## 2022-11-01 RX ORDER — PRASUGREL 5 MG/1
1 TABLET, FILM COATED ORAL DAILY
COMMUNITY
Start: 2022-08-02

## 2022-11-01 NOTE — PROGRESS NOTES
PATIENT:  Deacon Natarajan    1960      ASSESSMENT:     1  Diabetic polyneuropathy associated with type 2 diabetes mellitus ([facility] )     2  Absence of toe of right foot ([address] )     3  Contusion of lesser toe of right foot with damage to nail, subsequent encounter         PLAN:  No need for further treatment  Educated disease prevention and risks related to diabetes  Educated proper daily foot care and exam   Instructed proper skin care / protection and footwear  Discussed proper BS control with diet  RA in 9 weeks  Subjective:      HPI:   The patients presents for concerning discoloration of right 4th toenail  He lost his toenail and saw Dr Mariposa Ramirez last week  He has dark pigmentation of right 4th toe  No pain or redness  No drainage  He has high risk diabetic foot with history of ulcer and amputation  BS is under control  The following portions of the patient's history were reviewed and updated as appropriate: allergies, current medications, past family history, past medical history, past social history, past surgical history and problem list   All pertinent labs and images were reviewed      Past Medical History  Past Medical History:   Diagnosis Date   • Cerebrovascular accident (CVA) due to thrombosis of left middle cerebral artery ([facility] ) 7/29/2018   • Chronic kidney disease    • Diabetes mellitus ([facility] )    • GERD (gastroesophageal reflux disease)    • Hypercholesteremia    • Hyperlipidemia    • Hypertension    • 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   • Neuropathy    • Obesity    • Osteomyelitis ([facility] )     last assessed 11/4/16   • PVC's (premature ventricular contractions)     sees cardiology Dr Digna camargo   • Stroke [facility])     last weeof July 2018 [facility]   • TIA (transient ischemic attack) 10/28/2018       Past Surgical History  Past Surgical History:   Procedure Laterality Date   • ABDOMINAL SURGERY     • CARDIAC CATHETERIZATION N/A 5/2/2022    Procedure: Cardiac Coronary Angiogram;  Surgeon: Mona Plunkett MD;  [facility]: [facility] CARDIAC CATH LAB; Service: Cardiology   • CARDIAC CATHETERIZATION N/A 5/2/2022    Procedure: Cardiac pci;  Surgeon: Mona Plunkett MD;  Location: [facility] CARDIAC CATH LAB; Service: Cardiology   • CHOLECYSTECTOMY      Percutaneous   • COLONOSCOPY     • CYSTOSCOPY     • OTHER SURGICAL HISTORY      "stimulator to control bowel movements"   • [place] ESOPHAGOGASTRODUODENOSCOPY TRANSORAL DIAGNOSTIC N/A 9/27/2016    Procedure: ESOPHAGOGASTRODUODENOSCOPY (EGD); Surgeon: Jos Zarate MD;  Location: [facility] GI LAB; Service: Gastroenterology   • [place] LAP,CHOLECYSTECTOMY N/A 2/29/2016    Procedure: LAPAROSCOPIC CHOLECYSTECTOMY ;  Surgeon: Nida Ocampo DO;  Location: [facility] Main OR;  Service: General   • ROTATOR CUFF REPAIR Right    • TOE AMPUTATION Right 10/28/2016    Procedure: 3RD TOE AMPUTATION ;  Surgeon: Reva Sanchez DPM;  Location: [facility] Main OR;  Service:         Allergies:  Patient has no known allergies  Medications:  Current Outpatient Medications   Medication Sig Dispense Refill   • aspirin (ECOTRIN LOW STRENGTH) 81 mg EC tablet Take 81 mg by mouth daily Resume on 8/14       • atorvastatin (LIPITOR) 40 mg tablet TAKE 1 TABLET BY MOUTH DAILY WITH DINNER 90 tablet 1   • BD Pen Needle Adina U/F 32G X 4 MM MISC USE TO INJECT INSULIN 4 TIMES DAILY 400 each 1   • benzonatate (TESSALON PERLES) 100 mg capsule Take 1 capsule (100 mg total) by mouth 3 (three) times a day as needed for cough 20 capsule 0   • Blood Glucose Monitoring Suppl (True Metrix Meter) w/Device KIT Use to test blood sugars 3 times daily 1 kit 0   • Blood Pressure Monitoring (BLOOD PRESSURE CUFF) MISC Use to check blood pressure before taking blood pressure medication and 1 hour after and follow instructions provided in discharge instructions based on the readings   1 each 0   • calcium acetate (CALPHRON) 667 mg TAKE 2 TABLETS BY MOUTH THREE TIMES DAILY WITH MEALS     • carvedilol (COREG) 12 5 mg tablet Take 1 tablet (12 5 mg total) by mouth 2 (two) times a day with meals 180 tablet 3   • Cholecalciferol (Vitamin D3) 1 25 MG (46500 UT) CAPS TAKE 1 CAPSULE BY MOUTH ONE TIME PER WEEK 12 capsule 2   • Cinacalcet HCl (SENSIPAR PO) Take 60 mg by mouth 3 (three) times a week     • clopidogrel (PLAVIX) 75 mg tablet Take 1 tablet (75 mg total) by mouth in the morning  90 tablet 2   • Efavirenz (SUSTIVA PO) efavirenz     • glucose blood (True Metrix Blood Glucose Test) test strip Use 1 each 3 (three) times a day Use as instructed 300 strip 1   • guaiFENesin (MUCINEX) 600 mg 12 hr tablet Take 1 tablet (600 mg total) by mouth every 12 (twelve) hours for 20 days 40 tablet 0   • insulin lispro (HumaLOG KwikPen) 100 units/mL injection pen INJECT 4 UNITS 3 TIMES A DAY WITH MEALS PLUS SCALE (max daily dose 42 units) 30 mL 0   • Insulin Syringe-Needle U-100 (B-D INS SYRINGE 0 5CC/30GX1/2") 30G X 1/2" 0 5 ML MISC Inject once daily 100 each 1   • Lancets Ultra Thin 30G MISC Use 3 times a day 300 each 0   • Lantus 100 UNIT/ML subcutaneous injection INJECT 14 UNITS UNDER THE SKIN DAILY 10 mL 0   • Lokelma 10 g PACK TAKE 10 G BY MOUTH ONCE FOR 1 DOSE  INDICATIONS: HIGH AMOUNT OF POTASSIUM IN THE BLOOD     • meclizine (ANTIVERT) 25 mg tablet Take 1 tablet (25 mg total) by mouth every 8 (eight) hours as needed for dizziness 30 tablet 0   • ONETOUCH DELICA LANCETS 49K MISC by Does not apply route 3 (three) times a day 270 each 1   • prasugrel (EFFIENT) tablet Take 1 tablet by mouth daily     • Semaglutide,0 25 or 0 5MG/DOS, (Ozempic, 0 25 or 0 5 MG/DOSE,) 2 MG/1 5ML SOPN Inject 0 25 mg once a week 3 mL 5   • sertraline (ZOLOFT) 25 mg tablet Take 1 tablet (25 mg total) by mouth daily at bedtime 90 tablet 1   • torsemide (DEMADEX) 100 mg tablet Take 50 mg by mouth daily     • TORSEMIDE PO Take 50 mg by mouth Pt takes 50 mg daily on non- dialysis days: sat, sun, Tues, and thrusday   Pt takes 10 mg of torsemide daily on dialysis days m- w- f  • isosorbide mononitrate (IMDUR) 30 mg 24 hr tablet Take 1 tablet (30 mg total) by mouth daily 30 tablet 0   • torsemide (DEMADEX) 100 mg tablet Take 1 tablet (100 mg total) by mouth daily for 2 doses 2 tablet 0     No current facility-administered medications for this visit  Social History:  Social History     Socioeconomic History   • Marital status: /Civil Union     Spouse name: None   • Number of children: None   • Years of education: None   • Highest education level: Not asked   Occupational History   • Occupation: disabled   Tobacco Use   • Smoking status: Never Smoker   • Smokeless tobacco: Never Used   Vaping Use   • Vaping Use: Never used   Substance and Sexual Activity   • Alcohol use: Not Currently   • Drug use: No   • Sexual activity: Not Currently   Other Topics Concern   • None   Social History Narrative    Daily caffeine consumption 2-3 servings a day     Social Determinants of Health     Financial Resource Strain: Not on file   Food Insecurity: Not on file   Transportation Needs: No Transportation Needs   • Lack of Transportation (Medical): No   • Lack of Transportation (Non-Medical): No   Physical Activity: Not on file   Stress: Not on file   Social Connections: Not on file   Intimate Partner Violence: Not on file   Housing Stability: Not on file        Review of Systems   Constitutional: Negative for chills and fever  Respiratory: Negative for cough and shortness of breath  Cardiovascular: Negative for chest pain  Gastrointestinal: Negative for constipation, diarrhea, nausea and vomiting  Neurological: Positive for numbness  Objective:      /78   Ht 5' 9" (1 753 m) Comment: verbal  Wt 102 kg (224 lb)   BMI 33 08 kg/m²          Physical Exam  Vitals reviewed  Constitutional:       General: He is not in acute distress  Appearance: He is well-developed  He is not toxic-appearing or diaphoretic     Cardiovascular:      Rate and Rhythm: Normal rate and regular rhythm  Pulses:           Dorsalis pedis pulses are 1+ on the right side and 1+ on the left side  Posterior tibial pulses are 1+ on the right side and 1+ on the left side  Pulmonary:      Effort: Pulmonary effort is normal  No respiratory distress  Musculoskeletal:         General: Deformity present  No tenderness  Right foot: No foot drop  Left foot: No foot drop  Comments: Hammertoe deformity noted  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  Feet:      Right foot:      Skin integrity: Dry skin present  No ulcer, skin breakdown, erythema, warmth or callus  Left foot:      Skin integrity: Dry skin present  No ulcer, skin breakdown, erythema, warmth or callus  Skin:     General: Skin is warm  Capillary Refill: Capillary refill takes less than 2 seconds  Coloration: Skin is not cyanotic or mottled  Findings: No ecchymosis or erythema  Rash is not purpuric  Nails: There is no clubbing  Comments: Thick mycotic nails X 7 with discoloration and onycholysis  Nail bed of right 4th toe is healed with dry blood  No infection  Neurological:      General: No focal deficit present  Mental Status: He is alert and oriented to person, place, and time  Cranial Nerves: No cranial nerve deficit  Sensory: Sensory deficit present  Motor: No weakness  Psychiatric:         Mood and Affect: Mood normal          Behavior: Behavior normal          Thought Content:  Thought content normal          Judgment: Judgment normal

## 2022-11-02 ENCOUNTER — TELEPHONE (OUTPATIENT)
Dept: NEUROLOGY | Facility: CLINIC | Age: 62
End: 2022-11-02

## 2022-11-02 ENCOUNTER — TELEMEDICINE (OUTPATIENT)
Dept: NEUROLOGY | Facility: CLINIC | Age: 62
End: 2022-11-02

## 2022-11-02 VITALS — WEIGHT: 224 LBS | BODY MASS INDEX: 33.18 KG/M2 | HEIGHT: 69 IN

## 2022-11-02 DIAGNOSIS — E11.22 TYPE 2 DIABETES MELLITUS WITH CHRONIC KIDNEY DISEASE ON CHRONIC DIALYSIS, WITH LONG-TERM CURRENT USE OF INSULIN (HCC): ICD-10-CM

## 2022-11-02 DIAGNOSIS — R42 DIZZINESS: ICD-10-CM

## 2022-11-02 DIAGNOSIS — Z79.4 TYPE 2 DIABETES MELLITUS WITH CHRONIC KIDNEY DISEASE ON CHRONIC DIALYSIS, WITH LONG-TERM CURRENT USE OF INSULIN (HCC): ICD-10-CM

## 2022-11-02 DIAGNOSIS — N18.6 TYPE 2 DIABETES MELLITUS WITH CHRONIC KIDNEY DISEASE ON CHRONIC DIALYSIS, WITH LONG-TERM CURRENT USE OF INSULIN (HCC): ICD-10-CM

## 2022-11-02 DIAGNOSIS — F41.8 ANXIETY ASSOCIATED WITH DEPRESSION: ICD-10-CM

## 2022-11-02 DIAGNOSIS — Z86.73 HISTORY OF STROKE: Primary | ICD-10-CM

## 2022-11-02 DIAGNOSIS — E78.2 MIXED HYPERLIPIDEMIA: ICD-10-CM

## 2022-11-02 DIAGNOSIS — Z99.2 TYPE 2 DIABETES MELLITUS WITH CHRONIC KIDNEY DISEASE ON CHRONIC DIALYSIS, WITH LONG-TERM CURRENT USE OF INSULIN (HCC): ICD-10-CM

## 2022-11-02 NOTE — TELEPHONE ENCOUNTER
Called patient to check him out from his virtual appt and schedule his FU appt   Left message on machine for patient to call us to schedule his FU

## 2022-11-02 NOTE — PATIENT INSTRUCTIONS
Stroke: JACOBcrystal Cavazos for a follow-up with regard to his prior stroke  He reports no new stroke-like episodes with the exception of this past September when he had an episode of vertigo after dialysis/concurrent with dialysis  This is quite unusual for him  He was seen in the emergency room where they performed an appropriate examination and on head impulse test he had corrective movements of the eyes which is most consistent with vertigo coming from the inner ear as opposed to a brain problem  Those symptoms have resolved and not returned  In the interval since his last visit to the office he had coronary artery stents placed and had some stent thrombosis on both aspirin/ Plavix and aspirin/ Brilinta  - he is still having some challenges with waking up for about an hour at night  He is now on sertraline to help with depression / anxiety which I agree is reasonable  They feel like it does help when he takes at bedtime but he has been doing 1/2 tablet twice per day instead of unifying the dose at once per day  At this point in time I would recommend they  combine to a single dose wherein he can take 1 tablet at night only  - he should continue his combination of aspirin, Effient, statin, and appropriate blood pressure and glycemic control   - he should check his blood pressure intermittently away from the doctor's office and target numbers less than 130/80   - I would like him to remain physically active in as much as he feels capable of doing so and at the discretion of his cardiologist   -he talked about needing to lose approximately 5 lb to qualify for a renal transplant    We talked about reducing overall caloric intake, mindful eating, and increasing his physical exercise but again only as cleared to do so by the cardiologist  - he should continue to engage in sleep hygiene practices as we discussed at his last visit including reducing or eliminating any afternoon/ evening naps and caffeine   - he would be cleared from a Neurology standpoint to have a renal transplant at the discretion of the nephrology group  -we will defer to his primary care/cardiology team for monitoring of his cholesterol panel and blood sugar numbers  If they have not been checked in the last 6 months we would recommend a cholesterol panel to be done with a target LDL of less than 70  He should be monitoring his blood pressure with a target of less than 130/80  Hemoglobin A1c goal should be less than 7%  If    I will plan for him to return to the office in 8 months time to see me directly but would be happy to see him sooner if the need should arise  If he has any symptoms concerning for TIA or stroke including sudden painless loss of vision or double vision, difficulty speaking or swallowing, vertigo/room spinning that does not quickly resolve, or weakness/numbness/loss of coordination affecting 1 side of the face or body he should proceed by ambulance to the nearest emergency room immediately  If he were to fall and strike his head and have any residual symptoms whatsoever or to developed a sudden extremely severe very unusual headache he should also be seen in the nearest emergency room immediately

## 2022-11-02 NOTE — PROGRESS NOTES
Virtual Regular Visit    Verification of patient location:    Patient is located in the following state in which I hold an active license [place]      Assessment/Plan:   Patient Instructions   Stroke: Armando Cavazos for a follow-up with regard to his prior stroke  He reports no new stroke-like episodes with the exception of this past September when he had an episode of vertigo after dialysis/concurrent with dialysis  This is quite unusual for him  He was seen in the emergency room where they performed an appropriate examination and on head impulse test he had corrective movements of the eyes which is most consistent with vertigo coming from the inner ear as opposed to a brain problem  Those symptoms have resolved and not returned  In the interval since his last visit to the office he had coronary artery stents placed and had some stent thrombosis on both aspirin/ Plavix and aspirin/ Brilinta  - he is still having some challenges with waking up for about an hour at night  He is now on sertraline to help with depression / anxiety which I agree is reasonable  They feel like it does help when he takes at bedtime but he has been doing 1/2 tablet twice per day instead of unifying the dose at once per day  At this point in time I would recommend they  combine to a single dose wherein he can take 1 tablet at night only  - he should continue his combination of aspirin, Effient, statin, and appropriate blood pressure and glycemic control   - he should check his blood pressure intermittently away from the doctor's office and target numbers less than 130/80   - I would like him to remain physically active in as much as he feels capable of doing so and at the discretion of his cardiologist   -he talked about needing to lose approximately 5 lb to qualify for a renal transplant    We talked about reducing overall caloric intake, mindful eating, and increasing his physical exercise but again only as cleared to do so by the cardiologist  - he should continue to engage in sleep hygiene practices as we discussed at his last visit including reducing or eliminating any afternoon/ evening naps and caffeine   - he would be cleared from a Neurology standpoint to have a renal transplant at the discretion of the nephrology group  -we will defer to his primary care/cardiology team for monitoring of his cholesterol panel and blood sugar numbers  If they have not been checked in the last 6 months we would recommend a cholesterol panel to be done with a target LDL of less than 70  He should be monitoring his blood pressure with a target of less than 130/80  Hemoglobin A1c goal should be less than 7%  If    I will plan for him to return to the office in 8 months time to see me directly but would be happy to see him sooner if the need should arise  If he has any symptoms concerning for TIA or stroke including sudden painless loss of vision or double vision, difficulty speaking or swallowing, vertigo/room spinning that does not quickly resolve, or weakness/numbness/loss of coordination affecting 1 side of the face or body he should proceed by ambulance to the nearest emergency room immediately  If he were to fall and strike his head and have any residual symptoms whatsoever or to developed a sudden extremely severe very unusual headache he should also be seen in the nearest emergency room immediately          Problem List Items Addressed This Visit        Endocrine    Type 2 diabetes mellitus with chronic kidney disease on chronic dialysis, with long-term current use of insulin ([address] )       Other    Dizziness    Mixed hyperlipidemia    History of stroke - Primary    Anxiety associated with depression               Reason for visit is   Chief Complaint   Patient presents with   • Virtual Regular Visit        Encounter provider Star Hendrickson MD    Provider located at [address]  [phone]      Recent Visits  No visits were found meeting these conditions  Showing recent visits within past 7 days and meeting all other requirements  Today's Visits  Date Type Provider Dept   11/02/22 Telemedicine Toya Christian MD [facility] today's visits and meeting all other requirements  Future Appointments  No visits were found meeting these conditions  Showing future appointments within next 150 days and meeting all other requirements       The patient was identified by name and date of birth  Alejandra Sebastian was informed that this is a telemedicine visit and that the visit is being conducted through the [facility]  He agrees to proceed     My office door was closed  No one else was in the room  He acknowledged consent and understanding of privacy and security of the video platform  The patient has agreed to participate and understands they can discontinue the visit at any time  Patient is aware this is a billable service  Subjective  HPI   Have you had any new stroke like symptoms that were not previously present (Sudden loss of vision, difficulty speaking or swallowing,  vertigo/room spinning that did not quickly resolve, weakness or numbness affecting one side of the body)? no    Are you taking all of your medications as prescribed? Yes    Any side effects including bleeding/bruising? no    Ulises Marie presents with his son Valente Sutherland   For a follow-up with regard to his prior stroke  He reports no new stroke-like symptoms with the exception of an episode of peripheral vertigo which occurred in September and has not happened again  He has had coronary  Artery stents placed and as result is now on a combination of aspirin and Effient      We had a discussion with regard to losing weight as he needs to lose 5 more lb before he can engage in having a renal transplant performed    Past Medical History:   Diagnosis Date   • Cerebrovascular accident (CVA) due to thrombosis of left middle cerebral artery ([facility] ) 7/29/2018   • Chronic kidney disease    • Diabetes mellitus ([facility] )    • GERD (gastroesophageal reflux disease)    • Hypercholesteremia    • Hyperlipidemia    • Hypertension    • 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   • Neuropathy    • Obesity    • Osteomyelitis ([facility] )     last assessed 11/4/16   • PVC's (premature ventricular contractions)     sees cardiology Dr Godwin camargo   • Stroke [facility])     last weeof July 2018 [address] [facility]   • TIA (transient ischemic attack) 10/28/2018       Social History     Socioeconomic History   • Marital status: /Civil Union     Spouse name: None   • Number of children: None   • Years of education: None   • Highest education level: Not asked   Occupational History   • Occupation: disabled   Tobacco Use   • Smoking status: Never Smoker   • Smokeless tobacco: Never Used   Vaping Use   • Vaping Use: Never used   Substance and Sexual Activity   • Alcohol use: Not Currently   • Drug use: No   • Sexual activity: Not Currently   Other Topics Concern   • None   Social History Narrative    Daily caffeine consumption 2-3 servings a day     Social Determinants of Health     Financial Resource Strain: Not on file   Food Insecurity: Not on file   Transportation Needs: No Transportation Needs   • Lack of Transportation (Medical): No   • Lack of Transportation (Non-Medical):  No   Physical Activity: Not on file   Stress: Not on file   Social Connections: Not on file   Intimate Partner Violence: Not on file   Housing Stability: Not on file         Current Outpatient Medications:   •  aspirin (ECOTRIN LOW STRENGTH) 81 mg EC tablet, Take 81 mg by mouth daily Resume on 8/14  , Disp: , Rfl:   •  atorvastatin (LIPITOR) 40 mg tablet, TAKE 1 TABLET BY MOUTH DAILY WITH DINNER, Disp: 90 tablet, Rfl: 1  •  BD Pen Needle Adina U/F 32G X 4 MM MISC, USE TO INJECT INSULIN 4 TIMES DAILY, Disp: 400 each, Rfl: 1  •  Blood Glucose Monitoring Suppl (True Metrix Meter) w/Device KIT, Use to test blood sugars 3 times daily, Disp: 1 kit, Rfl: 0  •  Blood Pressure Monitoring (BLOOD PRESSURE CUFF) MISC, Use to check blood pressure before taking blood pressure medication and 1 hour after and follow instructions provided in discharge instructions based on the readings  , Disp: 1 each, Rfl: 0  •  calcium acetate (CALPHRON) 667 mg, 3 tablets 3x per day, Disp: , Rfl:   •  carvedilol (COREG) 12 5 mg tablet, Take 1 tablet (12 5 mg total) by mouth 2 (two) times a day with meals, Disp: 180 tablet, Rfl: 3  •  Cholecalciferol (Vitamin D3) 1 25 MG (41167 UT) CAPS, TAKE 1 CAPSULE BY MOUTH ONE TIME PER WEEK, Disp: 12 capsule, Rfl: 2  •  glucose blood (True Metrix Blood Glucose Test) test strip, Use 1 each 3 (three) times a day Use as instructed, Disp: 300 strip, Rfl: 1  •  insulin lispro (HumaLOG KwikPen) 100 units/mL injection pen, INJECT 4 UNITS 3 TIMES A DAY WITH MEALS PLUS SCALE (max daily dose 42 units), Disp: 30 mL, Rfl: 0  •  Insulin Syringe-Needle U-100 (B-D INS SYRINGE 0 5CC/30GX1/2") 30G X 1/2" 0 5 ML MISC, Inject once daily, Disp: 100 each, Rfl: 1  •  Lancets Ultra Thin 30G MISC, Use 3 times a day, Disp: 300 each, Rfl: 0  •  Lantus 100 UNIT/ML subcutaneous injection, INJECT 14 UNITS UNDER THE SKIN DAILY, Disp: 10 mL, Rfl: 0  •  meclizine (ANTIVERT) 25 mg tablet, Take 1 tablet (25 mg total) by mouth every 8 (eight) hours as needed for dizziness, Disp: 30 tablet, Rfl: 0  •  ONETOUCH DELICA LANCETS 89A MISC, by Does not apply route 3 (three) times a day, Disp: 270 each, Rfl: 1  •  prasugrel (EFFIENT) tablet, Take 1 tablet by mouth daily, Disp: , Rfl:   •  sertraline (ZOLOFT) 25 mg tablet, Take 1 tablet (25 mg total) by mouth daily at bedtime, Disp: 90 tablet, Rfl: 1  •  TORSEMIDE PO, Take 50 mg by mouth Pt takes 50 mg daily on non- dialysis days: sat, sun, Tues, and thrusday   Pt takes 10 mg of torsemide daily on dialysis days m- w- f  , Disp: , Rfl:   •  benzonatate (TESSALON PERLES) 100 mg capsule, Take 1 capsule (100 mg total) by mouth 3 (three) times a day as needed for cough (Patient not taking: Reported on 11/2/2022), Disp: 20 capsule, Rfl: 0  •  Cinacalcet HCl (SENSIPAR PO), Take 60 mg by mouth 3 (three) times a week (Patient not taking: Reported on 11/2/2022), Disp: , Rfl:   •  Efavirenz (SUSTIVA PO), efavirenz (Patient not taking: Reported on 11/2/2022), Disp: , Rfl:   •  guaiFENesin (MUCINEX) 600 mg 12 hr tablet, Take 1 tablet (600 mg total) by mouth every 12 (twelve) hours for 20 days (Patient not taking: Reported on 11/2/2022), Disp: 40 tablet, Rfl: 0  •  isosorbide mononitrate (IMDUR) 30 mg 24 hr tablet, Take 1 tablet (30 mg total) by mouth daily (Patient not taking: Reported on 11/2/2022), Disp: 30 tablet, Rfl: 0  •  Lokelma 10 g PACK, TAKE 10 G BY MOUTH ONCE FOR 1 DOSE  INDICATIONS: HIGH AMOUNT OF POTASSIUM IN THE BLOOD (Patient not taking: Reported on 11/2/2022), Disp: , Rfl:   •  Semaglutide,0 25 or 0 5MG/DOS, (Ozempic, 0 25 or 0 5 MG/DOSE,) 2 MG/1 5ML SOPN, Inject 0 25 mg once a week (Patient not taking: Reported on 11/2/2022), Disp: 3 mL, Rfl: 5  •  torsemide (DEMADEX) 100 mg tablet, Take 50 mg by mouth daily (Patient not taking: Reported on 11/2/2022), Disp: , Rfl:   •  torsemide (DEMADEX) 100 mg tablet, Take 1 tablet (100 mg total) by mouth daily for 2 doses (Patient not taking: Reported on 11/2/2022), Disp: 2 tablet, Rfl: 0    No Known Allergies       Review of Systems   Constitutional: Negative  Negative for appetite change and fever  HENT: Negative  Negative for hearing loss, tinnitus, trouble swallowing and voice change  Eyes: Negative for photophobia, pain and visual disturbance  Respiratory: Negative  Negative for shortness of breath  Cardiovascular: Negative  Negative for palpitations  Gastrointestinal: Negative  Negative for nausea and vomiting  Endocrine: Negative  Negative for cold intolerance  Genitourinary: Negative  Negative for dysuria, frequency and urgency  Musculoskeletal: Negative  Negative for gait problem, myalgias and neck pain  Skin: Negative  Negative for rash  Allergic/Immunologic: Negative  Neurological: Negative  Negative for dizziness, tremors, seizures, syncope, facial asymmetry, speech difficulty, weakness, light-headedness, numbness and headaches  Hematological: Negative  Does not bruise/bleed easily  Psychiatric/Behavioral: Negative  Negative for confusion, hallucinations and sleep disturbance  Reviewed ROS as entered by medical assistant  Video Exam    Vitals:    11/02/22 1055   Weight: 102 kg (224 lb)   Height: 5' 9" (1 753 m)       Physical Exam   At the time of my examination he was awake, alert, and in no distress  He continues to have dysarthric speech but is edentulous  Extraocular movements were intact with no nystagmus  Face was symmetric appearing  There was no obvious drift and no fix  He was able to ambulate    I have spent 24 minutes with Patient and family today including counseling/coordination of care regarding Risks and benefits of tx options, Intructions for management, Patient and family education and Importance of tx compliance as well as on chart review, communication, and documentation

## 2022-11-12 ENCOUNTER — HOSPITAL ENCOUNTER (EMERGENCY)
Facility: HOSPITAL | Age: 62
Discharge: HOME | End: 2022-11-12
Attending: EMERGENCY MEDICINE

## 2022-11-12 ENCOUNTER — APPOINTMENT (EMERGENCY)
Dept: CT IMAGING | Facility: HOSPITAL | Age: 62
End: 2022-11-12

## 2022-11-12 VITALS
HEIGHT: 69 IN | WEIGHT: 230.6 LBS | RESPIRATION RATE: 18 BRPM | SYSTOLIC BLOOD PRESSURE: 167 MMHG | BODY MASS INDEX: 34.16 KG/M2 | TEMPERATURE: 98.2 F | DIASTOLIC BLOOD PRESSURE: 77 MMHG | OXYGEN SATURATION: 95 % | HEART RATE: 66 BPM

## 2022-11-12 DIAGNOSIS — R42 VERTIGO: ICD-10-CM

## 2022-11-12 DIAGNOSIS — R42 DIZZINESS: Primary | ICD-10-CM

## 2022-11-12 DIAGNOSIS — R07.89 CHEST WALL PAIN: ICD-10-CM

## 2022-11-12 DIAGNOSIS — G44.209 TENSION-TYPE HEADACHE, NOT INTRACTABLE, UNSPECIFIED CHRONICITY PATTERN: ICD-10-CM

## 2022-11-12 LAB
2HR DELTA HS TROPONIN: -4 NG/L
ALBUMIN SERPL BCP-MCNC: 4.2 G/DL (ref 3.5–5)
ALP SERPL-CCNC: 86 U/L (ref 34–104)
ALT SERPL W P-5'-P-CCNC: 17 U/L (ref 7–52)
ANION GAP SERPL CALCULATED.3IONS-SCNC: 10 MMOL/L (ref 4–13)
AST SERPL W P-5'-P-CCNC: 12 U/L (ref 13–39)
ATRIAL RATE: 69 BPM
BASOPHILS # BLD AUTO: 0.01 THOUSANDS/ÂΜL (ref 0–0.1)
BASOPHILS NFR BLD AUTO: 0 % (ref 0–1)
BILIRUB SERPL-MCNC: 0.68 MG/DL (ref 0.2–1)
BUN SERPL-MCNC: 46 MG/DL (ref 5–25)
CALCIUM SERPL-MCNC: 10 MG/DL (ref 8.4–10.2)
CARDIAC TROPONIN I PNL SERPL HS: 37 NG/L
CARDIAC TROPONIN I PNL SERPL HS: 41 NG/L
CHLORIDE SERPL-SCNC: 96 MMOL/L (ref 96–108)
CK MB SERPL-MCNC: 2.1 % (ref 0–2.5)
CK MB SERPL-MCNC: 4 NG/ML (ref 0.6–6.3)
CK SERPL-CCNC: 188 U/L (ref 39–308)
CO2 SERPL-SCNC: 31 MMOL/L (ref 21–32)
CREAT SERPL-MCNC: 8.16 MG/DL (ref 0.6–1.3)
EOSINOPHIL # BLD AUTO: 0.16 THOUSAND/ÂΜL (ref 0–0.61)
EOSINOPHIL NFR BLD AUTO: 3 % (ref 0–6)
ERYTHROCYTE [DISTWIDTH] IN BLOOD BY AUTOMATED COUNT: 14.8 % (ref 11.6–15.1)
GFR SERPL CREATININE-BSD FRML MDRD: 6 ML/MIN/1.73SQ M
GLUCOSE SERPL-MCNC: 139 MG/DL (ref 65–140)
HCT VFR BLD AUTO: 36.5 % (ref 36.5–49.3)
HGB BLD-MCNC: 11.7 G/DL (ref 12–17)
HOLD SPECIMEN: NORMAL
IMM GRANULOCYTES # BLD AUTO: 0.04 THOUSAND/UL (ref 0–0.2)
IMM GRANULOCYTES NFR BLD AUTO: 1 % (ref 0–2)
LIPASE SERPL-CCNC: 24 U/L (ref 11–82)
LYMPHOCYTES # BLD AUTO: 1.18 THOUSANDS/ÂΜL (ref 0.6–4.47)
LYMPHOCYTES NFR BLD AUTO: 22 % (ref 14–44)
MCH RBC QN AUTO: 31.2 PG (ref 26.8–34.3)
MCHC RBC AUTO-ENTMCNC: 32.1 G/DL (ref 31.4–37.4)
MCV RBC AUTO: 97 FL (ref 82–98)
MONOCYTES # BLD AUTO: 0.57 THOUSAND/ÂΜL (ref 0.17–1.22)
MONOCYTES NFR BLD AUTO: 11 % (ref 4–12)
NEUTROPHILS # BLD AUTO: 3.3 THOUSANDS/ÂΜL (ref 1.85–7.62)
NEUTS SEG NFR BLD AUTO: 63 % (ref 43–75)
NRBC BLD AUTO-RTO: 0 /100 WBCS
P AXIS: 54 DEGREES
PLATELET # BLD AUTO: 144 THOUSANDS/UL (ref 149–390)
PMV BLD AUTO: 10.2 FL (ref 8.9–12.7)
POTASSIUM SERPL-SCNC: 4.4 MMOL/L (ref 3.5–5.3)
PR INTERVAL: 192 MS
PROT SERPL-MCNC: 7.1 G/DL (ref 6.4–8.4)
QRS AXIS: 1 DEGREES
QRSD INTERVAL: 164 MS
QT INTERVAL: 454 MS
QTC INTERVAL: 486 MS
RBC # BLD AUTO: 3.75 MILLION/UL (ref 3.88–5.62)
SODIUM SERPL-SCNC: 137 MMOL/L (ref 135–147)
T WAVE AXIS: 22 DEGREES
VENTRICULAR RATE: 69 BPM
WBC # BLD AUTO: 5.26 THOUSAND/UL (ref 4.31–10.16)

## 2022-11-12 RX ORDER — MECLIZINE HYDROCHLORIDE 25 MG/1
25 TABLET ORAL 3 TIMES DAILY PRN
Qty: 21 TABLET | Refills: 0 | Status: SHIPPED | OUTPATIENT
Start: 2022-11-12 | End: 2022-11-19

## 2022-11-12 RX ORDER — ACETAMINOPHEN 325 MG/1
650 TABLET ORAL ONCE
Status: COMPLETED | OUTPATIENT
Start: 2022-11-12 | End: 2022-11-12

## 2022-11-12 RX ORDER — MECLIZINE HCL 12.5 MG/1
25 TABLET ORAL ONCE
Status: COMPLETED | OUTPATIENT
Start: 2022-11-12 | End: 2022-11-12

## 2022-11-12 RX ADMIN — ACETAMINOPHEN 650 MG: 325 TABLET ORAL at 07:03

## 2022-11-12 RX ADMIN — MECLIZINE 25 MG: 12.5 TABLET ORAL at 07:05

## 2022-11-12 NOTE — ED ATTENDING ATTESTATION
11/12/2022  IDonato MD, saw and evaluated the patient  I have discussed the patient with the resident/non-physician practitioner and agree with the resident's/non-physician practitioner's findings, Plan of Care, and MDM as documented in the resident's/non-physician practitioner's note, except where noted  All available labs and Radiology studies were reviewed  I was present for key portions of any procedure(s) performed by the resident/non-physician practitioner and I was immediately available to provide assistance  At this point I agree with the current assessment done in the Emergency Department  I have conducted an independent evaluation of this patient a history and physical is as follows: Patient is a 58year old male with chest pain, dizziness, vertigo, headache today  Is due for dialysis this AM  No sob  No trauma  Patient is on effient and ASA  Was last seen in this ED on 10/24/22 for cough  No fever  No N/V/D  No abdominal pain  Has had prior ccy  [facility] website checked on this patient and last Rx filled was on 2/24/21 for ativan for 35 day supply  NCAT  PERRL  Mucous membranes moist  Lungs clear  (+) chest wall tenderness  Heart regular without murmur  Abdomen distended and nontender  Good bowel sounds  No rash noted  No focal deficits  DDx including but not limited to: Labyrinthitis, vestibular neuronitis,benign paroxysmal positional vertigo, Meniere's disease, metabolic abnormality, otitis media, tumor, intracranial process; doubt vertebral or carotid artery dissection  DDX including but not limited to: ACS, MI, doubt PE; PTX, pneumonia, doubt dissection; pleurisy, pericarditis, myocarditis, rhabdomyolysis, doubt GI etiology  DDx including but not limited to: tension headache, cluster headache, migraine, ICH; doubt SAH, tumor, meningitis, temporal arteritis, carbon monoxide poisoning, zoster, sinusitis  Will check EKG, labs and CTs       ED Course         Critical Care Time  Procedures

## 2022-11-12 NOTE — ED PROVIDER NOTES
History  Chief Complaint   Patient presents with   • Dizziness     Had Coughing and dizziness  Pt takes Blood thinner  Pts wife said if he gets dizzy to bring him to the ER  Also notes R sided headache  Hx of stroke  Jon Almazan comes emergency department after experiencing episodes of lightheadedness and dizziness along with right-sided head discomfort  Patient is accompanied by spouse  Spouse describes that they were counseled that any time the patient had head discomfort and dizziness that the patient should immediately come to the emergency department for evaluation secondary to the patient being on anticoagulation medication  Patient is noted to have a past medical history of CVA in the past     Patient states that the headache has been ongoing for 1 day with gradual onset  Patient states that typically the pain will respond to Tylenol  Patient is scheduled to undergo dialysis today  Patient and spouse deferred going to dialysis secondary to the patient coming to the emergency department secondary to his symptomatology  Patient denies any nausea, vomiting, changes in vision, changes in hearing, changes in his baseline urination status, or new rashes  Both patient has past wish to get evaluated for a potential sinister pathology given the presence of the dizziness and the headache while here in the emergency department        History provided by:  Patient   used: No    Dizziness  Quality:  Lightheadedness  Severity:  Mild  Onset quality:  Gradual  Duration:  1 day  Timing:  Intermittent  Progression:  Unchanged  Chronicity:  Recurrent  Context: not when bending over, not with bowel movement, not with ear pain, not with eye movement, not with head movement, not with inactivity, not with loss of consciousness, not with medication, not with physical activity, not when standing up and not when urinating    Relieved by:  Nothing  Worsened by:  Nothing  Ineffective treatments:  None tried  Associated symptoms: no chest pain, no palpitations, no shortness of breath and no vomiting        Prior to Admission Medications   Prescriptions Last Dose Informant Patient Reported? Taking? BD Pen Needle Adina U/F 32G X 4 MM MISC   No No   Sig: USE TO INJECT INSULIN 4 TIMES DAILY   Blood Glucose Monitoring Suppl (True Metrix Meter) w/Device KIT   No No   Sig: Use to test blood sugars 3 times daily   Blood Pressure Monitoring (BLOOD PRESSURE CUFF) MISC   No No   Sig: Use to check blood pressure before taking blood pressure medication and 1 hour after and follow instructions provided in discharge instructions based on the readings  Cholecalciferol (Vitamin D3) 1 25 MG (71315 UT) CAPS   No No   Sig: TAKE 1 CAPSULE BY MOUTH ONE TIME PER WEEK   Cinacalcet HCl (SENSIPAR PO)   Yes No   Sig: Take 60 mg by mouth 3 (three) times a week   Patient not taking: Reported on 11/2/2022   Efavirenz (SUSTIVA PO)   Yes No   Sig: efavirenz   Patient not taking: Reported on 11/2/2022   Insulin Syringe-Needle U-100 (B-D INS SYRINGE 0 5CC/30GX1/2") 30G X 1/2" 0 5 ML MISC   No No   Sig: Inject once daily   Lancets Ultra Thin 30G MISC   No No   Sig: Use 3 times a day   Lantus 100 UNIT/ML subcutaneous injection   No No   Sig: INJECT 14 UNITS UNDER THE SKIN DAILY   Lokelma 10 g PACK   Yes No   Sig: TAKE 10 G BY MOUTH ONCE FOR 1 DOSE  INDICATIONS: HIGH AMOUNT OF POTASSIUM IN THE BLOOD   Patient not taking: Reported on 00/9/0167   [facility] LANCETS 93A MISC   No No   Sig: by Does not apply route 3 (three) times a day   Semaglutide,0 25 or 0 5MG/DOS, (Ozempic, 0 25 or 0 5 MG/DOSE,) 2 MG/1 5ML SOPN   No No   Sig: Inject 0 25 mg once a week   Patient not taking: Reported on 11/2/2022   TORSEMIDE PO   Yes No   Sig: Take 50 mg by mouth Pt takes 50 mg daily on non- dialysis days: sat, sun, Tues, and thrusday   Pt takes 10 mg of torsemide daily on dialysis days m- w- f     aspirin (ECOTRIN LOW STRENGTH) 81 mg EC tablet   Yes No   Sig: Take 81 mg by mouth daily Resume on 8/14     atorvastatin (LIPITOR) 40 mg tablet   No No   Sig: TAKE 1 TABLET BY MOUTH DAILY WITH DINNER   benzonatate (TESSALON PERLES) 100 mg capsule   No No   Sig: Take 1 capsule (100 mg total) by mouth 3 (three) times a day as needed for cough   Patient not taking: Reported on 11/2/2022   calcium acetate (CALPHRON) 667 mg   Yes No   Sig: 3 tablets 3x per day   carvedilol (COREG) 12 5 mg tablet   No No   Sig: Take 1 tablet (12 5 mg total) by mouth 2 (two) times a day with meals   glucose blood (True Metrix Blood Glucose Test) test strip   No No   Sig: Use 1 each 3 (three) times a day Use as instructed   insulin lispro (HumaLOG KwikPen) 100 units/mL injection pen   No No   Sig: INJECT 4 UNITS 3 TIMES A DAY WITH MEALS PLUS SCALE (max daily dose 42 units)   isosorbide mononitrate (IMDUR) 30 mg 24 hr tablet   No No   Sig: Take 1 tablet (30 mg total) by mouth daily   Patient not taking: Reported on 11/2/2022   meclizine (ANTIVERT) 25 mg tablet   No No   Sig: Take 1 tablet (25 mg total) by mouth every 8 (eight) hours as needed for dizziness   prasugrel (EFFIENT) tablet   Yes No   Sig: Take 1 tablet by mouth daily   sertraline (ZOLOFT) 25 mg tablet   No No   Sig: Take 1 tablet (25 mg total) by mouth daily at bedtime   torsemide (DEMADEX) 100 mg tablet   Yes No   Sig: Take 50 mg by mouth daily   Patient not taking: Reported on 11/2/2022   torsemide (DEMADEX) 100 mg tablet   No No   Sig: Take 1 tablet (100 mg total) by mouth daily for 2 doses   Patient not taking: Reported on 11/2/2022      Facility-Administered Medications: None       Past Medical History:   Diagnosis Date   • Cerebrovascular accident (CVA) due to thrombosis of left middle cerebral artery [facility] ) 7/29/2018   • Chronic kidney disease    • Diabetes mellitus (HCC)    • GERD (gastroesophageal reflux disease)    • Hypercholesteremia    • Hyperlipidemia    • Hypertension    • 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   • Neuropathy    • Obesity    • Osteomyelitis ([facility] )     last assessed 11/4/16   • PVC's (premature ventricular contractions)     sees cardiology Dr Hamilton camargo   • Stroke [facility])     last weeof July 2018 [address] [facility]   • TIA (transient ischemic attack) 10/28/2018       Past Surgical History:   Procedure Laterality Date   • ABDOMINAL SURGERY     • CARDIAC CATHETERIZATION N/A 5/2/2022    Procedure: Cardiac Coronary Angiogram;  Surgeon: Frantz Jacobo MD;  [facility] CARDIAC CATH LAB; Service: Cardiology   • CARDIAC CATHETERIZATION N/A 5/2/2022    Procedure: Cardiac pci;  Surgeon: Frantz Jacobo MD;  Location: [facility] CARDIAC CATH LAB; Service: Cardiology   • CHOLECYSTECTOMY      Percutaneous   • COLONOSCOPY     • CYSTOSCOPY     • OTHER SURGICAL HISTORY      "stimulator to control bowel movements"   • HI ESOPHAGOGASTRODUODENOSCOPY TRANSORAL DIAGNOSTIC N/A 9/27/2016    Procedure: ESOPHAGOGASTRODUODENOSCOPY (EGD); Surgeon: Rich Oliver MD;  Location: [facility] GI LAB; Service: Gastroenterology   • HI LAP,CHOLECYSTECTOMY N/A 2/29/2016    Procedure: LAPAROSCOPIC CHOLECYSTECTOMY ;  Surgeon: Mel Rincon DO;  Location: [facility] Main OR;  Service: General   • ROTATOR CUFF REPAIR Right    • TOE AMPUTATION Right 10/28/2016    Procedure: 3RD TOE AMPUTATION ;  Surgeon: Driss Santos DPM;  Location: [facility] Main OR;  Service:        Family History   Problem Relation Age of Onset   • Leukemia Mother    • Liver disease Mother    • Lung cancer Mother         heavy smoker - 3 ppd   • Heart disease Father    • Liver disease Father    • Multiple myeloma Sister    • Breast cancer Sister    • Urolithiasis Family    • Alcohol abuse Neg Hx    • Depression Neg Hx    • Drug abuse Neg Hx    • Substance Abuse Neg Hx    • Mental illness Neg Hx      I have reviewed and agree with the history as documented      E-Cigarette/Vaping   • E-Cigarette Use Never User      E-Cigarette/Vaping Substances   • Nicotine No    • THC No    • CBD No    • Flavoring No    • Other No    • Unknown No      Social History     Tobacco Use   • Smoking status: Never   • Smokeless tobacco: Never   Vaping Use   • Vaping Use: Never used   Substance Use Topics   • Alcohol use: Not Currently   • Drug use: No        Review of Systems   Constitutional: Negative for chills and fever  HENT: Negative for ear pain and sore throat  Eyes: Negative for pain and visual disturbance  Respiratory: Negative for cough and shortness of breath  Cardiovascular: Negative for chest pain and palpitations  Gastrointestinal: Negative for abdominal pain and vomiting  Genitourinary: Negative for dysuria and hematuria  Musculoskeletal: Negative for arthralgias and back pain  Skin: Negative for color change and rash  Neurological: Positive for dizziness  Negative for seizures and syncope  All other systems reviewed and are negative  Physical Exam  ED Triage Vitals [11/12/22 0624]   Temperature Pulse Respirations Blood Pressure SpO2   98 2 °F (36 8 °C) 72 20 (!) 193/85 98 %      Temp Source Heart Rate Source Patient Position - Orthostatic VS BP Location FiO2 (%)   Oral Monitor Lying Right arm --      Pain Score       5             Orthostatic Vital Signs  Vitals:    11/12/22 0624 11/12/22 0836   BP: (!) 193/85 167/77   Pulse: 72 66   Patient Position - Orthostatic VS: Lying        Physical Exam  Vitals and nursing note reviewed  Constitutional:       Appearance: Normal appearance  He is well-developed  HENT:      Head: Normocephalic and atraumatic  Right Ear: External ear normal       Left Ear: External ear normal       Nose: Nose normal  No rhinorrhea  Mouth/Throat:      Mouth: Mucous membranes are moist    Eyes:      Conjunctiva/sclera: Conjunctivae normal    Cardiovascular:      Rate and Rhythm: Normal rate and regular rhythm  Heart sounds: No murmur heard  Pulmonary:      Effort: Pulmonary effort is normal  No respiratory distress        Breath sounds: Normal breath sounds  Abdominal:      General: There is distension  Palpations: Abdomen is soft  Tenderness: There is no abdominal tenderness  There is no right CVA tenderness, left CVA tenderness, guarding or rebound  Musculoskeletal:         General: Tenderness present  No deformity or signs of injury  Normal range of motion  Cervical back: Neck supple  Right lower leg: No edema  Left lower leg: No edema  Comments: Tenderness on palpation of the anterior chest wall  Skin:     General: Skin is warm and dry  Capillary Refill: Capillary refill takes less than 2 seconds  Neurological:      General: No focal deficit present  Mental Status: He is alert and oriented to person, place, and time  Psychiatric:         Mood and Affect: Mood normal          ED Medications  Medications   meclizine (ANTIVERT) tablet 25 mg (25 mg Oral Given 11/12/22 0705)   acetaminophen (TYLENOL) tablet 650 mg (650 mg Oral Given 11/12/22 0703)       Diagnostic Studies  Results Reviewed     Procedure Component Value Units Date/Time    HS Troponin I 2hr [[id number]]  (Normal) Collected: 11/12/22 0835    Lab Status: Final result Specimen: Blood from Arm, Right Updated: 11/12/22 0916     hs TnI 2hr 37 ng/L      Delta 2hr hsTnI -4 ng/L     Concepcion draw [[id number]] Collected: 11/12/22 0630    Lab Status: Final result Specimen: Blood from Arm, Right Updated: 11/12/22 5145    Narrative: The following orders were created for panel order Concepcion draw    Procedure                               Abnormality         Status                     ---------                               -----------         ------                     [place] Bone Top on TFHM[[id number]]                           Final result               Gold top on PJMS[[id number]]                                 Final result               Green / Yellow tube on AFIP[[id number]]                      Final result               Green / Black tube on WOL[[id number]]                       Final result               Lavender Top 3 ml on WRFC[[id number]]                        Final result                 Please view results for these tests on the individual orders      CKMB [[id number]]  (Normal) Collected: 11/12/22 0630    Lab Status: Final result Specimen: Blood from Arm, Right Updated: 11/12/22 0737     CK-MB Index 2 1 %      CK-MB 4 0 ng/mL     HS Troponin 0hr (reflex protocol) [[id number]]  (Normal) Collected: 11/12/22 0630    Lab Status: Final result Specimen: Blood from Arm, Right Updated: 11/12/22 0709     hs TnI 0hr 41 ng/L     CK Total with Reflex CKMB [[id number]]  (Normal) Collected: 11/12/22 0630    Lab Status: Final result Specimen: Blood from Arm, Right Updated: 11/12/22 0702     Total  U/L     Lipase [[id number]]  (Normal) Collected: 11/12/22 0630    Lab Status: Final result Specimen: Blood from Arm, Right Updated: 11/12/22 0702     Lipase 24 u/L     Comprehensive metabolic panel [[id number]]  (Abnormal) Collected: 11/12/22 0630    Lab Status: Final result Specimen: Blood from Arm, Right Updated: 11/12/22 6483     Sodium 137 mmol/L      Potassium 4 4 mmol/L      Chloride 96 mmol/L      CO2 31 mmol/L      ANION GAP 10 mmol/L      BUN 46 mg/dL      Creatinine 8 16 mg/dL      Glucose 139 mg/dL      Calcium 10 0 mg/dL      AST 12 U/L      ALT 17 U/L      Alkaline Phosphatase 86 U/L      Total Protein 7 1 g/dL      Albumin 4 2 g/dL      Total Bilirubin 0 68 mg/dL      eGFR 6 ml/min/1 73sq m     Narrative:      [place] guidelines for Chronic Kidney Disease (CKD):   •  Stage 1 with normal or high GFR (GFR > 90 mL/min/1 73 square meters)  •  Stage 2 Mild CKD (GFR = 60-89 mL/min/1 73 square meters)  •  Stage 3A Moderate CKD (GFR = 45-59 mL/min/1 73 square meters)  •  Stage 3B Moderate CKD (GFR = 30-44 mL/min/1 73 square meters)  •  Stage 4 Severe CKD (GFR = 15-29 mL/min/1 73 square meters)  •  Stage 5 End Stage CKD (GFR <15 mL/min/1 73 square meters)  Note: GFR calculation is accurate only with a steady state creatinine    CBC and differential [[id number]]  (Abnormal) Collected: 11/12/22 0630    Lab Status: Final result Specimen: Blood from Arm, Right Updated: 11/12/22 0654     WBC 5 26 Thousand/uL      RBC 3 75 Million/uL      Hemoglobin 11 7 g/dL      Hematocrit 36 5 %      MCV 97 fL      MCH 31 2 pg      MCHC 32 1 g/dL      RDW 14 8 %      MPV 10 2 fL      Platelets 896 Thousands/uL      nRBC 0 /100 WBCs      Neutrophils Relative 63 %      Immat GRANS % 1 %      Lymphocytes Relative 22 %      Monocytes Relative 11 %      Eosinophils Relative 3 %      Basophils Relative 0 %      Neutrophils Absolute 3 30 Thousands/µL      Immature Grans Absolute 0 04 Thousand/uL      Lymphocytes Absolute 1 18 Thousands/µL      Monocytes Absolute 0 57 Thousand/µL      Eosinophils Absolute 0 16 Thousand/µL      Basophils Absolute 0 01 Thousands/µL                  CT head without contrast   ED Interpretation by Magdalene Cole MD (11/12 0803)   FINDINGS:     PARENCHYMA: Decreased attenuation is noted in periventricular and subcortical white matter demonstrating an appearance that is statistically most likely to represent mild microangiopathic change      No CT signs of acute infarction  No intracranial mass, mass effect or midline shift  No acute parenchymal hemorrhage  Chronic appearing left basal ganglia infarcts     VENTRICLES AND EXTRA-AXIAL SPACES:  No hydrocephalus     VISUALIZED ORBITS AND PARANASAL SINUSES:  There is a partially opacified left maxillary sinus and mucosal thickening on the right  There is mucosal thickening within ethmoid air cells in the inferior left frontal sinus      CALVARIUM AND EXTRACRANIAL SOFT TISSUES:  Extensive intracranial and extracranial vascular calcifications      IMPRESSION:     1   Microangiopathic changes and chronic left basal ganglier infarcts without evidence of acute intracranial hemorrhage  2  Paranasal sinus inflammatory changes as above                    Workstation perfo   rmed: [id number]      Final Result by Anlsey Self MD (11/12 0756)      1  Microangiopathic changes and chronic left basal ganglier infarcts without evidence of acute intracranial hemorrhage   2  Paranasal sinus inflammatory changes as above                     Workstation performed: [id number]         CT chest without contrast   ED Interpretation by Payam Robbins MD (11/12 0805)   FINDINGS:     LUNGS:  There is mild motion artifact which could preclude detection of small pulmonary nodules  No airspace consolidation     PLEURA:  Scattered foci of mild thickening without pneumothorax     HEART/GREAT VESSELS: Dense coronary artery calcification or stents with calcification in region of mitral valve and aortic valve  No pericardial effusion  No thoracic aortic aneurysm      MEDIASTINUM AND LAZARUS:  Unremarkable      CHEST WALL AND LOWER NECK:  Bilateral gynecomastia     VISUALIZED STRUCTURES IN THE UPPER ABDOMEN:  Status post cholecystectomy      OSSEOUS STRUCTURES:  No acute fracture or destructive osseous lesion      IMPRESSION:     1  Within the confines of mild motion artifact, no acute pulmonary disease  2  Coronary artery calcification and/or stents, calcification in the region of the mitral and aortic valve  3  No evidence of acute rib fracture, pneumothorax, or pleural effusion,                 Workstation performed: [id number]      Final Result by Ansley Self MD (11/12 0803)      1  Within the confines of mild motion artifact, no acute pulmonary disease  2  Coronary artery calcification and/or stents, calcification in the region of the mitral and aortic valve     3  No evidence of acute rib fracture, pneumothorax, or pleural effusion,                  Workstation performed: [id number]               Procedures  ECG 12 Lead Documentation Only    Date/Time: 11/12/2022 8:44 AM  Performed by: Aashish Scruggs MD  Authorized by: Aashish Scruggs MD     ECG reviewed by me, the ED Provider: yes    Patient location:  ED  Previous ECG:     Previous ECG:  Compared to current    Similarity:  No change    Comparison to cardiac monitor: No    Interpretation:     Interpretation: normal    Rate:     ECG rate:  69    ECG rate assessment: normal    Rhythm:     Rhythm: sinus rhythm    Ectopy:     Ectopy: none    QRS:     QRS axis:  Normal    QRS intervals:  Normal  Conduction:     Conduction: abnormal      Abnormal conduction: complete RBBB    ST segments:     ST segments:  Normal  T waves:     T waves: normal            ED Course             HEART Risk Score    Flowsheet Row Most Recent Value   Heart Score Risk Calculator    History 1 Filed at: 11/20/2022 2147   ECG 0 Filed at: 11/20/2022 2147   Age 1 Filed at: 11/20/2022 2147   Risk Factors 2 Filed at: 11/20/2022 2147   Troponin 2 Filed at: 11/20/2022 2147   HEART Score 6 Filed at: 11/20/2022 2147                                MDM  Number of Diagnoses or Management Options  Chest wall pain: new and requires workup  Dizziness: established and worsening  Tension-type headache, not intractable, unspecified chronicity pattern: established and worsening  Vertigo: established and worsening  Diagnosis management comments: [facility] emergency department was right-sided headache and episodes of dizziness prior to going to his dialysis session today  Secondary to the combination of the symptoms, both he and spouse came to the emergency department to be evaluated for potential intracranial bleed given the patient's anticoagulation history  Based off initial presenting complaints, initial laboratory studies imaging studies were conducted  CT of the head was unremarkable for any intracranial pathology  CT of the chest without contrast was unremarkable for any acute pathology      CBC, CMP, lipase were deemed consistent with previous lab draws with no acute exacerbation symptoms  Patient is noted to have chronically elevated BUN and creatinine  This appears consistent previous laboratory draws  Patient is scheduled to go for dialysis today  Initial troponin was noted to be in the 40s  Her delta troponin value was collected and sent  A troponin value was pending at time of sign out to the [facility] provider  ECG tracing was notable for no change when compared to previous ECG tracing  No ischemic changes appreciated on examination  Patient endorsed complete resolution of symptoms with utilization of Tylenol and Antivert in the emergency department  Based on evaluation with laboratory studies, disposition is pending the results of second troponin values  Anticipated plan is discharge with close communication with dialysis provider for dialysis if troponin value is normal at two hours  If there is a significant elevation in troponin, anticipate inpatient admission with inpatient dialysis for evaluation of increasing trop  This plan was disucssed with the [facility] provider and expressed understanding         Amount and/or Complexity of Data Reviewed  Clinical lab tests: ordered and reviewed  Tests in the radiology section of CPT®: ordered and reviewed  Obtain history from someone other than the patient: yes  Review and summarize past medical records: yes  Discuss the patient with other providers: yes  Independent visualization of images, tracings, or specimens: yes    Risk of Complications, Morbidity, and/or Mortality  Presenting problems: low  Diagnostic procedures: low  Management options: low    Patient Progress  Patient progress: stable      Disposition  Final diagnoses:   Dizziness   Tension-type headache, not intractable, unspecified chronicity pattern   Vertigo   Chest wall pain     Time reflects when diagnosis was documented in both MDM as applicable and the Disposition within this note     Time User Action Codes Description Comment    11/12/2022  8:50 AM Charlene Santos Add [R42] Dizziness     11/12/2022  8:51 AM Charlene Santos Add [G44 209] Tension-type headache, not intractable, unspecified chronicity pattern     11/12/2022  9:04 AM Charlene Santos Add [R42] Vertigo     11/12/2022  9:04 AM Charlene Santos Add [R07 89] Chest wall pain       ED Disposition     ED Disposition   Discharge    Condition   Stable    Date/Time   Sat Nov 12, 2022  9:27 AM    Comment   Kamille Galloway discharge to home/self care  Follow-up Information     Follow up With Specialties Details Why Contact Info Additional Information    Amador Liu,  Internal Medicine Schedule an appointment as soon as possible for a visit  return sooner if increased pain, fever, worsening dizziness and vertigo, vomiting, difficulty breathing, weakness  [address] [facility] Emergency Department Emergency Medicine  As needed, If symptoms worsen [address] [facility] [id number] [place] [address] Emergency Department, [address], [place], [place], [address]          Discharge Medication List as of 11/12/2022  9:29 AM      START taking these medications    Details   !! meclizine (ANTIVERT) 25 mg tablet Take 1 tablet (25 mg total) by mouth 3 (three) times a day as needed for dizziness for up to 7 days, Starting Sat 11/12/2022, Until Sat 11/19/2022 at 2359, Normal       !! - Potential duplicate medications found  Please discuss with provider        CONTINUE these medications which have NOT CHANGED    Details   aspirin (ECOTRIN LOW STRENGTH) 81 mg EC tablet Take 81 mg by mouth daily Resume on 8/14  , Historical Med      atorvastatin (LIPITOR) 40 mg tablet TAKE 1 TABLET BY MOUTH DAILY WITH DINNER, Normal      BD Pen Needle Adina U/F 32G X 4 MM MISC USE TO INJECT INSULIN 4 TIMES DAILY, Normal      benzonatate (TESSALON PERLES) 100 mg capsule Take 1 capsule (100 mg total) by mouth 3 (three) times a day as needed for cough, Starting Fri 10/21/2022, Normal      Blood Glucose Monitoring Suppl (True Metrix Meter) w/Device KIT Use to test blood sugars 3 times daily, Normal      Blood Pressure Monitoring (BLOOD PRESSURE CUFF) MISC Use to check blood pressure before taking blood pressure medication and 1 hour after and follow instructions provided in discharge instructions based on the readings  , Print      calcium acetate (CALPHRON) 667 mg 3 tablets 3x per day, Historical Med      carvedilol (COREG) 12 5 mg tablet Take 1 tablet (12 5 mg total) by mouth 2 (two) times a day with meals, Starting Thu 10/13/2022, Until Sat 11/12/2022, Normal      Cholecalciferol (Vitamin D3) 1 25 MG (65684 UT) CAPS TAKE 1 CAPSULE BY MOUTH ONE TIME PER WEEK, Normal      Cinacalcet HCl (SENSIPAR PO) Take 60 mg by mouth 3 (three) times a week, Starting Mon 6/20/2022, Until Mon 6/19/2023, Historical Med      Efavirenz (SUSTIVA PO) efavirenz, Historical Med      glucose blood (True Metrix Blood Glucose Test) test strip Use 1 each 3 (three) times a day Use as instructed, Starting Fri 7/1/2022, Normal      insulin lispro (HumaLOG KwikPen) 100 units/mL injection pen INJECT 4 UNITS 3 TIMES A DAY WITH MEALS PLUS SCALE (max daily dose 42 units), Normal      Insulin Syringe-Needle U-100 (B-D INS SYRINGE 0 5CC/30GX1/2") 30G X 1/2" 0 5 ML MISC Inject once daily, Normal      isosorbide mononitrate (IMDUR) 30 mg 24 hr tablet Take 1 tablet (30 mg total) by mouth daily, Starting Sun 6/12/2022, Until Thu 7/14/2022, Normal      !! Lancets Ultra Thin 30G MISC Use 3 times a day, Normal      Lantus 100 UNIT/ML subcutaneous injection INJECT 14 UNITS UNDER THE SKIN DAILY, Starting Mon 10/24/2022, Normal      Lokelma 10 g PACK TAKE 10 G BY MOUTH ONCE FOR 1 DOSE   INDICATIONS: HIGH AMOUNT OF POTASSIUM IN THE BLOOD, Historical Med      !! meclizine (ANTIVERT) 25 mg tablet Take 1 tablet (25 mg total) by mouth every 8 (eight) hours as needed for dizziness, Starting Thu 9/22/2022, Normal      !! ONETOUCH DELICA LANCETS 90Z MISC by Does not apply route 3 (three) times a day, Starting Tue 11/27/2018, Normal      prasugrel (EFFIENT) tablet Take 1 tablet by mouth daily, Starting Tue 8/2/2022, Historical Med      Semaglutide,0 25 or 0 5MG/DOS, (Ozempic, 0 25 or 0 5 MG/DOSE,) 2 MG/1 5ML SOPN Inject 0 25 mg once a week, Normal      sertraline (ZOLOFT) 25 mg tablet Take 1 tablet (25 mg total) by mouth daily at bedtime, Starting Thu 6/16/2022, Normal      !! torsemide (DEMADEX) 100 mg tablet Take 50 mg by mouth daily, Starting Tue 7/19/2022, Historical Med      !! TORSEMIDE PO Take 50 mg by mouth Pt takes 50 mg daily on non- dialysis days: sat, sun, Tues, and thrusday  Pt takes 10 mg of torsemide daily on dialysis days m- w- f  , Starting Mon 10/25/2021, Historical Med       !! - Potential duplicate medications found  Please discuss with provider  No discharge procedures on file  PDMP Review       Value Time User    PDMP Reviewed  Yes 11/12/2022  6:19 AM Natalya Flores MD           ED Provider  Attending physically available and evaluated Quinten Benjamin I managed the patient along with the ED Attending      Electronically Signed by         Asael Calvo MD  11/14/22 [id number]       Asael Calvo MD  11/20/22 [id number]

## 2022-11-22 LAB
LEFT EYE DIABETIC RETINOPATHY: POSITIVE
RIGHT EYE DIABETIC RETINOPATHY: POSITIVE

## 2022-12-13 ENCOUNTER — TELEPHONE (OUTPATIENT)
Dept: ADMINISTRATIVE | Facility: OTHER | Age: 62
End: 2022-12-13

## 2022-12-13 NOTE — TELEPHONE ENCOUNTER
----- Message from Xiao Freitas MA sent at 12/13/2022  9:13 AM EST -----  Regarding: dm eye exam  12/13/22 9:14 AM    Hello, our patient Milton uMniz has had Diabetic Eye Exam completed/performed  Please assist in updating the patient chart by pulling the document from the Media Tab  The date of service is 11/23/2022       Thank you,  Xiao Freitas MA  [facility] INTERNAL MED

## 2022-12-14 ENCOUNTER — TELEMEDICINE (OUTPATIENT)
Dept: ENDOCRINOLOGY | Facility: CLINIC | Age: 62
End: 2022-12-14

## 2022-12-14 DIAGNOSIS — I12.0 BENIGN HYPERTENSION WITH CHRONIC KIDNEY DISEASE, STAGE V (HCC): ICD-10-CM

## 2022-12-14 DIAGNOSIS — Z79.4 TYPE 2 DIABETES MELLITUS WITH HYPERGLYCEMIA, WITH LONG-TERM CURRENT USE OF INSULIN (HCC): Primary | ICD-10-CM

## 2022-12-14 DIAGNOSIS — E11.65 TYPE 2 DIABETES MELLITUS WITH HYPERGLYCEMIA, WITH LONG-TERM CURRENT USE OF INSULIN (HCC): Primary | ICD-10-CM

## 2022-12-14 DIAGNOSIS — E78.2 MIXED HYPERLIPIDEMIA: ICD-10-CM

## 2022-12-14 DIAGNOSIS — N18.5 BENIGN HYPERTENSION WITH CHRONIC KIDNEY DISEASE, STAGE V (HCC): ICD-10-CM

## 2022-12-14 DIAGNOSIS — E11.65 TYPE 2 DIABETES MELLITUS WITH HYPERGLYCEMIA, UNSPECIFIED WHETHER LONG TERM INSULIN USE (HCC): Chronic | ICD-10-CM

## 2022-12-14 RX ORDER — PEN NEEDLE, DIABETIC 32GX 5/32"
NEEDLE, DISPOSABLE MISCELLANEOUS
Qty: 400 EACH | Refills: 1 | Status: SHIPPED | OUTPATIENT
Start: 2022-12-14

## 2022-12-14 RX ORDER — INSULIN GLARGINE 100 [IU]/ML
14 INJECTION, SOLUTION SUBCUTANEOUS DAILY
Qty: 30 ML | Refills: 0 | Status: SHIPPED | OUTPATIENT
Start: 2022-12-14

## 2022-12-14 RX ORDER — INSULIN LISPRO 100 [IU]/ML
INJECTION, SOLUTION INTRAVENOUS; SUBCUTANEOUS
Qty: 30 ML | Refills: 1 | Status: SHIPPED | OUTPATIENT
Start: 2022-12-14

## 2022-12-14 NOTE — TELEPHONE ENCOUNTER
Upon review of the In Basket request we were able to locate, review, and update the patient chart as requested for Diabetic Eye Exam     Any additional questions or concerns should be emailed to the Practice Liaisons via the appropriate education email address, please do not reply via In Basket      Thank you  Patricio Zhang

## 2022-12-14 NOTE — PATIENT INSTRUCTIONS
Hypoglycemia instructions   Leo Friends  12/14/2022  [id number]    Low Blood Sugar    Steps to treat low blood sugar  1  Test blood sugar if you have symptoms of low blood sugar:   Low Blood Sugar Symptoms:  o Sweaty  o Dizzy  o Rapid heartbeat  o Shaky    o Bad mood  o Hungry      2  Treat blood sugar less than 70 with 15 grams of fast-acting carbohydrate:   Examples of 15 grams Fast-Acting Carbohydrate:  o 4 oz juice  o 4 oz regular soda  o 3-4 glucose tablets (chew)  o 3-4 hard candies (chew)              3    Wait 15 minutes and test your blood sugar again           4   If blood sugar is less than 100, repeat steps 2-3       5  When your blood sugar is 100 or more, eat a snack if it will be longer than one hour until your next meal  The snack should be 15 grams of carbohydrate and a protein:   Examples of snacks:  o ½ sandwich  o 6 crackers with cheese  o Piece of fruit with cheese or peanut butter  o 6 crackers with peanut butter

## 2022-12-14 NOTE — PROGRESS NOTES
Virtual Regular Visit    Verification of patient location:    Patient is located in the following state in which I hold an active license [place]      Assessment/Plan:    Diagnoses and all orders for this visit:    Type 2 diabetes mellitus with hyperglycemia, with long-term current use of insulin ([facility] )    Lab Results   Component Value Date    HGBA1C 6 0 (H) 07/25/2022   A1c is 6 0%, in July 2022  Patient is due for blood work now for A1c and fructosamine  Blood work ordered  Will continue current regimen, as blood sugars are usually in 100-160 mg/dL range  Patient is using continues glucose monitor sensor and in its benefiting from that to keep blood sugars at goal, preventing hyperglycemia and hypoglycemia  Recommended continues use of continues glucose monitor sensor, also discussed to upload the sensor in 2 weeks so we can download the data and make changes accordingly  Discussed hypoglycemia symptoms and treatment  -     Hemoglobin A1C; Future  -     Fructosamine; Future  -     Basic metabolic panel; Future  -     Hemoglobin A1C; Future  -     insulin glargine (Lantus) 100 units/mL subcutaneous injection; Inject 14 Units under the skin daily  -     insulin lispro (HumaLOG KwikPen) 100 units/mL injection pen; INJECT 4 UNITS 3 TIMES A DAY WITH MEALS PLUS SCALE (max daily dose 42 units)  -     Insulin Pen Needle (BD Pen Needle Adina U/F) 32G X 4 MM MISC; Use 4 times daily    Mixed hyperlipidemia  Continue statins, Lipitor 40 mg daily  Benign hypertension with chronic kidney disease, stage V (HCC)  Blood pressure is well controlled usually  Continue current management as per PCP and cardiology  Type 2 diabetes mellitus with hyperglycemia, unspecified whether long term insulin use (HCC)  Continue insulin regimen  Discussed importance of follow-up with Ophthalmology and podiatry  -     Insulin Syringe-Needle U-100 (B-D INS SYRINGE 0 5CC/30GX1/2") 30G X 1/2" 0 5 ML MISC;  Inject once daily        Problem List Items Addressed This Visit        Other    Mixed hyperlipidemia   Other Visit Diagnoses     Type 2 diabetes mellitus with hyperglycemia, with long-term current use of insulin (HCC)    -  Primary    Relevant Medications    insulin glargine (Lantus) 100 units/mL subcutaneous injection    insulin lispro (HumaLOG KwikPen) 100 units/mL injection pen    Insulin Pen Needle (BD Pen Needle Adina U/F) 32G X 4 MM MISC    Other Relevant Orders    Hemoglobin A1C    Fructosamine    Basic metabolic panel    Hemoglobin A1C    Benign hypertension with chronic kidney disease, stage V (HCC)        Type 2 diabetes mellitus with hyperglycemia, unspecified whether long term insulin use (HCC)  (Chronic)       Relevant Medications    insulin glargine (Lantus) 100 units/mL subcutaneous injection    Insulin Syringe-Needle U-100 (B-D INS SYRINGE 0 5CC/30GX1/2") 30G X 1/2" 0 5 ML MISC    insulin lispro (HumaLOG KwikPen) 100 units/mL injection pen               Reason for visit is   Chief Complaint   Patient presents with   • Virtual Regular Visit        Encounter provider Bethanie Hernadez MD    Provider located at [place] [phone]      Recent Visits  No visits were found meeting these conditions  Showing recent visits within past 7 days and meeting all other requirements  Today's Visits  Date Type Provider Dept   12/14/22 Telemedicine Bethanie Hernadez MD [facility] [place]   Showing today's visits and meeting all other requirements  Future Appointments  No visits were found meeting these conditions  Showing future appointments within next 150 days and meeting all other requirements       The patient was identified by name and date of birth  Akash Woodall was informed that this is a telemedicine visit and that the visit is being conducted through the [facility]  He agrees to proceed     My office door was closed  No one else was in the room  He acknowledged consent and understanding of privacy and security of the video platform  The patient has agreed to participate and understands they can discontinue the visit at any time  Patient is aware this is a billable service  Subjective  Jr Lara is a 58 y o  male with medical history of coronary artery disease, type 2 diabetes with neuropathy, macular edema, end-stage renal disease on hemodialysis, hypertension and hyperlipidemia is here for follow-up  Diabetes course has been stable     Current regimen is Humalog 4 units 3 times daily with meals, Lantus 14 units at bedtime  Patient checks blood sugars 2-3 times daily and his blood sugars are usually in 120-180 mg/dL range  For hypertension, he takes Coreg 12 5 mg twice a day  For hyperlipidemia, he takes Lipitor 40 mg daily  Sodium 135 - 147 mmol/L 141  139  136  137  135  136 R  135    Potassium 3 5 - 5 3 mmol/L 4 0  3 9  4 4  4 5  5 2  4 8  4 2    Chloride 96 - 108 mmol/L 96  93 Low   96  100  100  105 R  97    CO2 21 - 32 mmol/L 32  36 High   28  29  22  24  25    ANION GAP 4 - 13 mmol/L 13  10  12  8  13  7  13    BUN 5 - 25 mg/dL 35 High   26 High   48 High   49 High   58 High   59 High   40 High     Creatinine 0 60 - 1 30 mg/dL 6 64 High   5 29 High  CM  6 67 High  CM  7 62 High  CM  8 34 High  CM  7 93 High  CM  6 52 High  CM    Comment: Standardized to IDMS reference method   Glucose 65 - 140 mg/dL 107  171 High  CM  209 High  CM   121 CM   103 CM    Comment: If the patient is fasting, the ADA then defines impaired fasting glucose as > 100 mg/dL and diabetes as > or equal to 123 mg/dL  Specimen collection should occur prior to Sulfasalazine administration due to the potential for falsely depressed results  Specimen collection should occur prior to Sulfapyridine administration due to the potential for falsely elevated results     Calcium 8 4 - 10 2 mg/dL 9 7  9 8  9 1  9 6 R  9 8  9 3 R  8 6    AST 13 - 39 U/L 14  15 CM   6 R, CM  10 Low  CM  16 R, CM     Comment: Specimen collection should occur prior to Sulfasalazine administration due to the potential for falsely depressed results  ALT 7 - 52 U/L 14  15 CM   22 R, CM  11 CM  25 R, CM     Comment: Specimen collection should occur prior to Sulfasalazine administration due to the potential for falsely depressed results  Alkaline Phosphatase 34 - 104 U/L 77  93   99 R  77  118 High  R     Total Protein 6 4 - 8 4 g/dL 7 4  7 7   7 6  7 2  7 4 R     Albumin 3 5 - 5 0 g/dL 4 2  4 4   3 7  4 2  3 6     Total Bilirubin 0 20 - 1 00 mg/dL 0 70  0 47   0 51 CM  0 53  0 39 CM     eGFR ml/min/1 73sq m 8  10  8  6  6  6  8          Lab Results   Component Value Date    HGBA1C 6 0 (H) 07/25/2022         Past Medical History:   Diagnosis Date   • Cerebrovascular accident (CVA) due to thrombosis of left middle cerebral artery ([facility] ) 7/29/2018   • Chronic kidney disease    • Diabetes mellitus ([facility] )    • GERD (gastroesophageal reflux disease)    • Hypercholesteremia    • Hyperlipidemia    • Hypertension    • 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   • Neuropathy    • Obesity    • Osteomyelitis ([facility] )     last assessed 11/4/16   • PVC's (premature ventricular contractions)     sees cardiology Dr Stanley camargo   • Stroke [facility])     last weeof July 2018 [facility]   • TIA (transient ischemic attack) 10/28/2018       Past Surgical History:   Procedure Laterality Date   • ABDOMINAL SURGERY     • CARDIAC CATHETERIZATION N/A 5/2/2022    Procedure: Cardiac Coronary Angiogram;  Surgeon: Dalila Lucas MD;  [facility] CARD[facility]; Service: Cardiology   • CARDIAC CATHETERIZATION N/A 5/2/2022    Procedure: Cardiac pci;  Surgeon: Dalila Lucas MD;  [facility]: [facility] CARDIAC CATH [facility];   Service: Cardiology   • CHOLECYSTECTOMY      Percutaneous   • COLONOSCOPY     • CYSTOSCOPY     • OTHER SURGICAL HISTORY      "stimulator to control bowel movements"   • VT ESOPHAGOGASTRODUODENOSCOPY TRANSORAL DIAGNOSTIC N/A 9/27/2016    Procedure: ESOPHAGOGASTRODUODENOSCOPY (EGD); Surgeon: Essie Garcia MD;  Location: [facility] GI LAB; Service: Gastroenterology   • VT LAP,CHOLECYSTECTOMY N/A 2/29/2016    Procedure: LAPAROSCOPIC CHOLECYSTECTOMY ;  Surgeon: Tawana Calderon DO;  Location: [facility] Main OR;  Service: General   • ROTATOR CUFF REPAIR Right    • TOE AMPUTATION Right 10/28/2016    Procedure: 3RD TOE AMPUTATION ;  Surgeon: Leora Bansal DPM;  Location: [facility] Main OR;  Service:        Current Outpatient Medications   Medication Sig Dispense Refill   • Blood Glucose Monitoring Suppl (True Metrix Meter) w/Device KIT Use to test blood sugars 3 times daily 1 kit 0   • Blood Pressure Monitoring (BLOOD PRESSURE CUFF) MISC Use to check blood pressure before taking blood pressure medication and 1 hour after and follow instructions provided in discharge instructions based on the readings   1 each 0   • glucose blood (True Metrix Blood Glucose Test) test strip Use 1 each 3 (three) times a day Use as instructed 300 strip 1   • insulin glargine (Lantus) 100 units/mL subcutaneous injection Inject 14 Units under the skin daily 30 mL 0   • insulin lispro (HumaLOG KwikPen) 100 units/mL injection pen INJECT 4 UNITS 3 TIMES A DAY WITH MEALS PLUS SCALE (max daily dose 42 units) 30 mL 1   • Insulin Pen Needle (BD Pen Needle Adina U/F) 32G X 4 MM MISC Use 4 times daily 400 each 1   • Insulin Syringe-Needle U-100 (B-D INS SYRINGE 0 5CC/30GX1/2") 30G X 1/2" 0 5 ML MISC Inject once daily 100 each 1   • Lancets Ultra Thin 30G MISC Use 3 times a day 300 each 0   • aspirin (ECOTRIN LOW STRENGTH) 81 mg EC tablet Take 81 mg by mouth daily Resume on 8/14       • atorvastatin (LIPITOR) 40 mg tablet TAKE 1 TABLET BY MOUTH EVERY DAY WITH DINNER 90 tablet 1   • benzonatate (TESSALON PERLES) 100 mg capsule Take 1 capsule (100 mg total) by mouth 3 (three) times a day as needed for cough (Patient not taking: Reported on 11/2/2022) 20 capsule 0   • calcium acetate (CALPHRON) 667 mg 3 tablets 3x per day     • carvedilol (COREG) 12 5 mg tablet Take 1 tablet (12 5 mg total) by mouth 2 (two) times a day with meals 180 tablet 3   • Cholecalciferol (Vitamin D3) 1 25 MG (41499 UT) CAPS TAKE 1 CAPSULE BY MOUTH ONE TIME PER WEEK 12 capsule 2   • Cinacalcet HCl (SENSIPAR PO) Take 60 mg by mouth 3 (three) times a week (Patient not taking: Reported on 11/2/2022)     • Efavirenz (SUSTIVA PO) efavirenz (Patient not taking: Reported on 11/2/2022)     • isosorbide mononitrate (IMDUR) 30 mg 24 hr tablet Take 1 tablet (30 mg total) by mouth daily (Patient not taking: Reported on 11/2/2022) 30 tablet 0   • Lokelma 10 g PACK TAKE 10 G BY MOUTH ONCE FOR 1 DOSE  INDICATIONS: HIGH AMOUNT OF POTASSIUM IN THE BLOOD (Patient not taking: Reported on 11/2/2022)     • meclizine (ANTIVERT) 25 mg tablet Take 1 tablet (25 mg total) by mouth every 8 (eight) hours as needed for dizziness 30 tablet 0   • meclizine (ANTIVERT) 25 mg tablet Take 1 tablet (25 mg total) by mouth 3 (three) times a day as needed for dizziness for up to 7 days 21 tablet 0   • ONETOUCH DELICA LANCETS 05C MISC by Does not apply route 3 (three) times a day 270 each 1   • prasugrel (EFFIENT) tablet Take 1 tablet by mouth daily     • Semaglutide,0 25 or 0 5MG/DOS, (Ozempic, 0 25 or 0 5 MG/DOSE,) 2 MG/1 5ML SOPN Inject 0 25 mg once a week (Patient not taking: Reported on 11/2/2022) 3 mL 5   • sertraline (ZOLOFT) 25 mg tablet TAKE 1 TABLET BY MOUTH DAILY AT BEDTIME 90 tablet 1   • torsemide (DEMADEX) 100 mg tablet Take 50 mg by mouth daily (Patient not taking: Reported on 11/2/2022)     • torsemide (DEMADEX) 100 mg tablet Take 1 tablet (100 mg total) by mouth daily for 2 doses (Patient not taking: Reported on 11/2/2022) 2 tablet 0   • TORSEMIDE PO Take 50 mg by mouth Pt takes 50 mg daily on non- dialysis days: sat, sun, Tues, and thrusday   Pt takes 10 mg of torsemide daily on dialysis days m- w- f  No current facility-administered medications for this visit  No Known Allergies    Review of Systems   Constitutional: Positive for activity change and fatigue  Negative for diaphoresis, fever and unexpected weight change  HENT: Negative  Eyes: Negative for visual disturbance  Respiratory: Negative for cough, chest tightness and shortness of breath  Cardiovascular: Negative for chest pain, palpitations and leg swelling  Gastrointestinal: Negative for abdominal pain, constipation, diarrhea, nausea and vomiting  Endocrine: Negative for cold intolerance, heat intolerance, polydipsia, polyphagia and polyuria  Genitourinary: Negative for dysuria, enuresis, frequency and urgency  Musculoskeletal: Negative for arthralgias and myalgias  Skin: Negative for pallor, rash and wound  Allergic/Immunologic: Negative  Neurological: Negative for dizziness, tremors, weakness and numbness  Hematological: Negative  Psychiatric/Behavioral: Negative  Video Exam    There were no vitals filed for this visit  Physical Exam  Constitutional:       General: He is not in acute distress  Appearance: Normal appearance  He is not ill-appearing  HENT:      Head: Normocephalic and atraumatic  Nose: Nose normal    Eyes:      Extraocular Movements: Extraocular movements intact  Conjunctiva/sclera: Conjunctivae normal    Pulmonary:      Effort: No respiratory distress  Musculoskeletal:      Cervical back: Normal range of motion  Neurological:      General: No focal deficit present  Mental Status: He is alert and oriented to person, place, and time     Psychiatric:         Mood and Affect: Mood normal          Behavior: Behavior normal           I spent 20 minutes directly with the patient during this visit

## 2022-12-15 DIAGNOSIS — E78.2 MIXED HYPERLIPIDEMIA: ICD-10-CM

## 2022-12-16 RX ORDER — ATORVASTATIN CALCIUM 40 MG/1
TABLET, FILM COATED ORAL
Qty: 90 TABLET | Refills: 1 | Status: SHIPPED | OUTPATIENT
Start: 2022-12-16

## 2022-12-21 ENCOUNTER — TELEPHONE (OUTPATIENT)
Dept: ADMINISTRATIVE | Facility: OTHER | Age: 62
End: 2022-12-21

## 2022-12-21 NOTE — TELEPHONE ENCOUNTER
Upon review of the In Basket request we were able to locate, review, and update the patient chart as requested for Diabetic Eye Exam     Any additional questions or concerns should be emailed to the Practice Liaisons via the appropriate education email address, please do not reply via In Basket      Thank you  Odin Rivas

## 2022-12-21 NOTE — TELEPHONE ENCOUNTER
----- Message from Erika Crisostomo [facility] sent at 12/14/2022 11:40 AM EST -----  Regarding: RE: dm eye exam  So open another message?   ----- Message -----  From: Allyn Leventhal  Sent: 12/13/2022   6:29 PM EST  To: Tati Norman MA  Subject: RE: dm eye exam                                  The office's non-staff message must be re-sent as a staff message in order for the quality request to be processed per the approved workflow  Thank you  Allyn Leventhal    ----- Message -----  From: Tati Norman MA  Sent: 12/13/2022   9:14 AM EST  To: [address]  Subject: dm eye exam                                      12/13/22 9:14 AM    Hello, our patient Bhavana Dang has had Diabetic Eye Exam completed/performed  Please assist in updating the patient chart by pulling the document from the Media Tab  The date of service is 11/23/2022       Thank you,  Tati Norman MA  [facility][note 3] INTERNAL MED

## 2022-12-23 ENCOUNTER — APPOINTMENT (OUTPATIENT)
Dept: LAB | Facility: CLINIC | Age: 62
End: 2022-12-23

## 2022-12-23 DIAGNOSIS — Z79.4 TYPE 2 DIABETES MELLITUS WITH HYPERGLYCEMIA, WITH LONG-TERM CURRENT USE OF INSULIN (HCC): ICD-10-CM

## 2022-12-23 DIAGNOSIS — E11.65 TYPE 2 DIABETES MELLITUS WITH HYPERGLYCEMIA, WITH LONG-TERM CURRENT USE OF INSULIN (HCC): ICD-10-CM

## 2022-12-23 DIAGNOSIS — N18.6 END STAGE RENAL DISEASE (HCC): ICD-10-CM

## 2022-12-23 LAB
ANION GAP SERPL CALCULATED.3IONS-SCNC: 10 MMOL/L (ref 4–13)
BUN SERPL-MCNC: 47 MG/DL (ref 5–25)
CALCIUM SERPL-MCNC: 9.5 MG/DL (ref 8.3–10.1)
CHLORIDE SERPL-SCNC: 97 MMOL/L (ref 96–108)
CO2 SERPL-SCNC: 32 MMOL/L (ref 21–32)
CREAT SERPL-MCNC: 7.17 MG/DL (ref 0.6–1.3)
EST. AVERAGE GLUCOSE BLD GHB EST-MCNC: 111 MG/DL
GFR SERPL CREATININE-BSD FRML MDRD: 7 ML/MIN/1.73SQ M
GLUCOSE P FAST SERPL-MCNC: 157 MG/DL (ref 65–99)
HBA1C MFR BLD: 5.5 %
HBV SURFACE AB SER-ACNC: 320.13 MIU/ML
HCV AB SER QL: NORMAL
POTASSIUM SERPL-SCNC: 4.5 MMOL/L (ref 3.5–5.3)
PSA SERPL-MCNC: 1.2 NG/ML (ref 0–4)
SODIUM SERPL-SCNC: 139 MMOL/L (ref 135–147)

## 2022-12-24 LAB
FRUCTOSAMINE SERPL-SCNC: 313 UMOL/L (ref 0–285)
MRSA NOSE QL CULT: NORMAL
SARS-COV-2 SEMI-QUANT IGG AB: >800 AU/ML
SARS-COV-2 SPIKE AB INTERP CONTAINING ATTACHED ABBREV COVDSN: POSITIVE

## 2022-12-29 ENCOUNTER — TELEPHONE (OUTPATIENT)
Dept: FAMILY MEDICINE CLINIC | Facility: CLINIC | Age: 62
End: 2022-12-29

## 2022-12-29 DIAGNOSIS — F41.8 ANXIETY ASSOCIATED WITH DEPRESSION: Primary | ICD-10-CM

## 2022-12-29 NOTE — TELEPHONE ENCOUNTER
A referral was entered back in April    Referral entered again today    Please provide the daughter with phone # to call and schedule with [facility]'s psychiatry

## 2022-12-29 NOTE — TELEPHONE ENCOUNTER
Patient requesting a referral to psychiatry   He saw the therapist but theres is nothing he can do for him   When referral is completed please call Daughter Laith Salmeron [phone]

## 2023-01-03 ENCOUNTER — OFFICE VISIT (OUTPATIENT)
Dept: PODIATRY | Facility: CLINIC | Age: 63
End: 2023-01-03

## 2023-01-03 VITALS
BODY MASS INDEX: 34.1 KG/M2 | SYSTOLIC BLOOD PRESSURE: 100 MMHG | DIASTOLIC BLOOD PRESSURE: 70 MMHG | HEIGHT: 69 IN | WEIGHT: 230.2 LBS

## 2023-01-03 DIAGNOSIS — E11.42 DIABETIC POLYNEUROPATHY ASSOCIATED WITH TYPE 2 DIABETES MELLITUS (HCC): Primary | ICD-10-CM

## 2023-01-03 DIAGNOSIS — B35.1 ONYCHOMYCOSIS: ICD-10-CM

## 2023-01-03 DIAGNOSIS — Z89.421 ABSENCE OF TOE OF RIGHT FOOT (HCC): ICD-10-CM

## 2023-01-03 NOTE — PROGRESS NOTES
PATIENT:  Wilder Rocha    1960      ASSESSMENT:     1  Diabetic polyneuropathy associated with type 2 diabetes mellitus ([facility] )        2  Absence of toe of right foot ([address] )        3  Onychomycosis  Debridement          PLAN:  Educated disease prevention and risks related to diabetes  Educated proper daily foot care and exam   Instructed proper skin care / protection and footwear  Discussed proper BS control with diet  RA in 9 weeks  Procedure: All mycotic toenails were reduced and debrided in length, width, and girth using a nail nipper and dremel  Patient tolerated procedure(s) well without complications  Subjective:      HPI:   The patients presents for diabetic foot care and exam   He has high risk diabetic foot with history of ulcer and amputation  No ulcer in his feet  BS is under control  No acute pedal problems  The following portions of the patient's history were reviewed and updated as appropriate: allergies, current medications, past family history, past medical history, past social history, past surgical history and problem list   All pertinent labs and images were reviewed      Past Medical History  Past Medical History:   Diagnosis Date   • Cerebrovascular accident (CVA) due to thrombosis of left middle cerebral artery ([facility] ) 7/29/2018   • Chronic kidney disease    • Diabetes mellitus ([facility] )    • GERD (gastroesophageal reflux disease)    • Hypercholesteremia    • Hyperlipidemia    • Hypertension    • 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   • Neuropathy    • Obesity    • Osteomyelitis ([facility] [address] )     last assessed 11/4/16   • PVC's (premature ventricular contractions)     sees cardiology Dr Juvenal Sandhoff singh   • Stroke [facility])     last weeof July 2018 [facility]   • TIA (transient ischemic attack) 10/28/2018       Past Surgical History  Past Surgical History:   Procedure Laterality Date   • ABDOMINAL SURGERY     • CARDIAC CATHETERIZATION N/A 5/2/2022    Procedure: Cardiac Coronary Angiogram;  Surgeon: Avril Handley MD;  [facility]: [facility] CARD[facility]; Service: Cardiology   • CARDIAC CATHETERIZATION N/A 5/2/2022    Procedure: Cardiac pci;  Surgeon: Avril Handley MD;  [facility]: [facility] CARDIAC CATH LAB; Service: Cardiology   • CHOLECYSTECTOMY      Percutaneous   • COLONOSCOPY     • CYSTOSCOPY     • OTHER SURGICAL HISTORY      "stimulator to control bowel movements"   • [place] ESOPHAGOGASTRODUODENOSCOPY TRANSORAL DIAGNOSTIC N/A 9/27/2016    Procedure: ESOPHAGOGASTRODUODENOSCOPY (EGD); Surgeon: Wanda Riley MD;  Location: [facility] GI LAB; Service: Gastroenterology   • [place] LAPAROSCOPY SURG CHOLECYSTECTOMY N/A 2/29/2016    Procedure: LAPAROSCOPIC CHOLECYSTECTOMY ;  Surgeon: Kari Tejada DO;  Location: [facility] Main OR;  Service: General   • ROTATOR CUFF REPAIR Right    • TOE AMPUTATION Right 10/28/2016    Procedure: 3RD TOE AMPUTATION ;  Surgeon: Fanta Cueto DPM;  Location: [facility] Main OR;  Service:         Allergies:  Patient has no known allergies  Medications:  Current Outpatient Medications   Medication Sig Dispense Refill   • aspirin (ECOTRIN LOW STRENGTH) 81 mg EC tablet Take 81 mg by mouth daily Resume on 8/14       • atorvastatin (LIPITOR) 40 mg tablet TAKE 1 TABLET BY MOUTH EVERY DAY WITH DINNER 90 tablet 1   • Blood Glucose Monitoring Suppl (True Metrix Meter) w/Device KIT Use to test blood sugars 3 times daily 1 kit 0   • Blood Pressure Monitoring (BLOOD PRESSURE CUFF) MISC Use to check blood pressure before taking blood pressure medication and 1 hour after and follow instructions provided in discharge instructions based on the readings   1 each 0   • calcium acetate (CALPHRON) 667 mg 3 tablets 3x per day     • Cholecalciferol (Vitamin D3) 1 25 MG (90375 UT) CAPS TAKE 1 CAPSULE BY MOUTH ONE TIME PER WEEK 12 capsule 2   • glucose blood (True Metrix Blood Glucose Test) test strip Use 1 each 3 (three) times a day Use as instructed 300 strip 1   • insulin glargine (Lantus) 100 units/mL subcutaneous injection Inject 14 Units under the skin daily 30 mL 0   • insulin lispro (HumaLOG KwikPen) 100 units/mL injection pen INJECT 4 UNITS 3 TIMES A DAY WITH MEALS PLUS SCALE (max daily dose 42 units) 30 mL 1   • Insulin Pen Needle (BD Pen Needle Adina U/F) 32G X 4 MM MISC Use 4 times daily 400 each 1   • Insulin Syringe-Needle U-100 (B-D INS SYRINGE 0 5CC/30GX1/2") 30G X 1/2" 0 5 ML MISC Inject once daily 100 each 1   • Lancets Ultra Thin 30G MISC Use 3 times a day 300 each 0   • meclizine (ANTIVERT) 25 mg tablet Take 1 tablet (25 mg total) by mouth every 8 (eight) hours as needed for dizziness 30 tablet 0   • ONETOUCH DELICA LANCETS 16L MISC by Does not apply route 3 (three) times a day 270 each 1   • prasugrel (EFFIENT) tablet Take 1 tablet by mouth daily     • sertraline (ZOLOFT) 25 mg tablet TAKE 1 TABLET BY MOUTH DAILY AT BEDTIME 90 tablet 1   • TORSEMIDE PO Take 50 mg by mouth Pt takes 50 mg daily on non- dialysis days: sat, sun, Tues, and thrusday  Pt takes 10 mg of torsemide daily on dialysis days m- w- f       • benzonatate (TESSALON PERLES) 100 mg capsule Take 1 capsule (100 mg total) by mouth 3 (three) times a day as needed for cough (Patient not taking: Reported on 11/2/2022) 20 capsule 0   • carvedilol (COREG) 12 5 mg tablet Take 1 tablet (12 5 mg total) by mouth 2 (two) times a day with meals 180 tablet 3   • Cinacalcet HCl (SENSIPAR PO) Take 60 mg by mouth 3 (three) times a week (Patient not taking: Reported on 11/2/2022)     • Efavirenz (SUSTIVA PO) efavirenz (Patient not taking: Reported on 11/2/2022)     • isosorbide mononitrate (IMDUR) 30 mg 24 hr tablet Take 1 tablet (30 mg total) by mouth daily (Patient not taking: Reported on 11/2/2022) 30 tablet 0   • Lokelma 10 g PACK TAKE 10 G BY MOUTH ONCE FOR 1 DOSE   INDICATIONS: HIGH AMOUNT OF POTASSIUM IN THE BLOOD (Patient not taking: Reported on 11/2/2022)     • meclizine (ANTIVERT) 25 mg tablet Take 1 tablet (25 mg total) by mouth 3 (three) times a day as needed for dizziness for up to 7 days 21 tablet 0   • Methoxy PEG-Epoetin Beta 30 MCG/0 3ML SOSY 30 mcg (Patient not taking: Reported on 1/3/2023)     • Semaglutide,0 25 or 0 5MG/DOS, (Ozempic, 0 25 or 0 5 MG/DOSE,) 2 MG/1 5ML SOPN Inject 0 25 mg once a week (Patient not taking: Reported on 11/2/2022) 3 mL 5   • torsemide (DEMADEX) 100 mg tablet Take 50 mg by mouth daily (Patient not taking: Reported on 11/2/2022)     • torsemide (DEMADEX) 100 mg tablet Take 1 tablet (100 mg total) by mouth daily for 2 doses (Patient not taking: Reported on 11/2/2022) 2 tablet 0     No current facility-administered medications for this visit  Social History:  Social History     Socioeconomic History   • Marital status: /Civil Union     Spouse name: None   • Number of children: None   • Years of education: None   • Highest education level: Not asked   Occupational History   • Occupation: disabled   Tobacco Use   • Smoking status: Never   • Smokeless tobacco: Never   Vaping Use   • Vaping Use: Never used   Substance and Sexual Activity   • Alcohol use: Not Currently   • Drug use: No   • Sexual activity: Not Currently   Other Topics Concern   • None   Social History Narrative    Daily caffeine consumption 2-3 servings a day     Social Determinants of Health     Financial Resource Strain: Not on file   Food Insecurity: Not on file   Transportation Needs: No Transportation Needs   • Lack of Transportation (Medical): No   • Lack of Transportation (Non-Medical): No   Physical Activity: Not on file   Stress: Not on file   Social Connections: Not on file   Intimate Partner Violence: Not on file   Housing Stability: Not on file        Review of Systems   Constitutional: Negative for chills and fever  Respiratory: Negative for cough and shortness of breath  Cardiovascular: Negative for chest pain  Gastrointestinal: Negative for constipation, diarrhea, nausea and vomiting     Neurological: Positive for numbness  Objective:      /70   Ht 5' 9" (1 753 m) Comment: verbal  Wt 104 kg (230 lb 3 2 oz)   BMI 33 99 kg/m²          Physical Exam  Vitals reviewed  Constitutional:       General: He is not in acute distress  Appearance: He is well-developed  He is not toxic-appearing or diaphoretic  Cardiovascular:      Rate and Rhythm: Normal rate and regular rhythm  Pulses:           Dorsalis pedis pulses are 1+ on the right side and 1+ on the left side  Posterior tibial pulses are 1+ on the right side and 1+ on the left side  Pulmonary:      Effort: Pulmonary effort is normal  No respiratory distress  Musculoskeletal:         General: Deformity present  No tenderness  Right foot: No foot drop  Left foot: No foot drop  Comments: Hammertoe deformity noted  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  Feet:      Right foot:      Skin integrity: Dry skin present  No ulcer, skin breakdown, erythema, warmth or callus  Left foot:      Skin integrity: Dry skin present  No ulcer, skin breakdown, erythema, warmth or callus  Skin:     General: Skin is warm  Capillary Refill: Capillary refill takes less than 2 seconds  Coloration: Skin is not cyanotic or mottled  Findings: No ecchymosis or erythema  Rash is not purpuric  Nails: There is no clubbing  Comments: Thick mycotic nails X 7 with discoloration and onycholysis  He has class finding with previous toe amputation  Neurological:      General: No focal deficit present  Mental Status: He is alert and oriented to person, place, and time  Cranial Nerves: No cranial nerve deficit  Sensory: Sensory deficit present  Motor: No weakness  Psychiatric:         Mood and Affect: Mood normal          Behavior: Behavior normal          Thought Content:  Thought content normal          Judgment: Judgment normal

## 2023-01-05 ENCOUNTER — HOSPITAL ENCOUNTER (INPATIENT)
Facility: HOSPITAL | Age: 63
LOS: 1 days | Discharge: HOME | End: 2023-01-07
Attending: EMERGENCY MEDICINE | Admitting: INTERNAL MEDICINE

## 2023-01-05 ENCOUNTER — APPOINTMENT (EMERGENCY)
Dept: CT IMAGING | Facility: HOSPITAL | Age: 63
End: 2023-01-05

## 2023-01-05 ENCOUNTER — APPOINTMENT (EMERGENCY)
Dept: RADIOLOGY | Facility: HOSPITAL | Age: 63
End: 2023-01-05

## 2023-01-05 DIAGNOSIS — R45.851 SUICIDAL IDEATION: ICD-10-CM

## 2023-01-05 DIAGNOSIS — R55 SYNCOPE: Primary | ICD-10-CM

## 2023-01-05 DIAGNOSIS — E87.70 FLUID OVERLOAD: ICD-10-CM

## 2023-01-05 DIAGNOSIS — R41.89 COGNITIVE IMPAIRMENT: ICD-10-CM

## 2023-01-05 DIAGNOSIS — N18.6 ESRD ON DIALYSIS (HCC): ICD-10-CM

## 2023-01-05 DIAGNOSIS — R77.8 ELEVATED TROPONIN: ICD-10-CM

## 2023-01-05 DIAGNOSIS — F69 BEHAVIOR CONCERN IN ADULT: ICD-10-CM

## 2023-01-05 DIAGNOSIS — N18.6 ESRD (END STAGE RENAL DISEASE) (HCC): ICD-10-CM

## 2023-01-05 DIAGNOSIS — Z99.2 ESRD ON DIALYSIS (HCC): ICD-10-CM

## 2023-01-05 LAB
2HR DELTA HS TROPONIN: -10 NG/L
4HR DELTA HS TROPONIN: -8 NG/L
ALBUMIN SERPL BCP-MCNC: 4 G/DL (ref 3.5–5)
ALP SERPL-CCNC: 76 U/L (ref 34–104)
ALT SERPL W P-5'-P-CCNC: 10 U/L (ref 7–52)
AMPHETAMINES SERPL QL SCN: NEGATIVE
ANION GAP SERPL CALCULATED.3IONS-SCNC: 9 MMOL/L (ref 4–13)
AST SERPL W P-5'-P-CCNC: 10 U/L (ref 13–39)
BARBITURATES UR QL: NEGATIVE
BASOPHILS # BLD AUTO: 0.03 THOUSANDS/ÂΜL (ref 0–0.1)
BASOPHILS NFR BLD AUTO: 1 % (ref 0–1)
BENZODIAZ UR QL: NEGATIVE
BILIRUB SERPL-MCNC: 0.61 MG/DL (ref 0.2–1)
BNP SERPL-MCNC: 796 PG/ML (ref 0–100)
BUN SERPL-MCNC: 30 MG/DL (ref 5–25)
CALCIUM SERPL-MCNC: 9 MG/DL (ref 8.4–10.2)
CARDIAC TROPONIN I PNL SERPL HS: 103 NG/L
CARDIAC TROPONIN I PNL SERPL HS: 93 NG/L
CARDIAC TROPONIN I PNL SERPL HS: 95 NG/L
CHLORIDE SERPL-SCNC: 95 MMOL/L (ref 96–108)
CO2 SERPL-SCNC: 31 MMOL/L (ref 21–32)
COCAINE UR QL: NEGATIVE
CREAT SERPL-MCNC: 5.66 MG/DL (ref 0.6–1.3)
EOSINOPHIL # BLD AUTO: 0.1 THOUSAND/ÂΜL (ref 0–0.61)
EOSINOPHIL NFR BLD AUTO: 2 % (ref 0–6)
ERYTHROCYTE [DISTWIDTH] IN BLOOD BY AUTOMATED COUNT: 14.6 % (ref 11.6–15.1)
GFR SERPL CREATININE-BSD FRML MDRD: 9 ML/MIN/1.73SQ M
GLUCOSE SERPL-MCNC: 120 MG/DL (ref 65–140)
GLUCOSE SERPL-MCNC: 133 MG/DL (ref 65–140)
GLUCOSE SERPL-MCNC: 186 MG/DL (ref 65–140)
HCT VFR BLD AUTO: 33.9 % (ref 36.5–49.3)
HGB BLD-MCNC: 11 G/DL (ref 12–17)
IMM GRANULOCYTES # BLD AUTO: 0.05 THOUSAND/UL (ref 0–0.2)
IMM GRANULOCYTES NFR BLD AUTO: 1 % (ref 0–2)
LYMPHOCYTES # BLD AUTO: 1.24 THOUSANDS/ÂΜL (ref 0.6–4.47)
LYMPHOCYTES NFR BLD AUTO: 26 % (ref 14–44)
MCH RBC QN AUTO: 31.2 PG (ref 26.8–34.3)
MCHC RBC AUTO-ENTMCNC: 32.4 G/DL (ref 31.4–37.4)
MCV RBC AUTO: 96 FL (ref 82–98)
METHADONE UR QL: NEGATIVE
MONOCYTES # BLD AUTO: 0.67 THOUSAND/ÂΜL (ref 0.17–1.22)
MONOCYTES NFR BLD AUTO: 14 % (ref 4–12)
NEUTROPHILS # BLD AUTO: 2.78 THOUSANDS/ÂΜL (ref 1.85–7.62)
NEUTS SEG NFR BLD AUTO: 56 % (ref 43–75)
NRBC BLD AUTO-RTO: 0 /100 WBCS
OPIATES UR QL SCN: NEGATIVE
OXYCODONE+OXYMORPHONE UR QL SCN: NEGATIVE
PCP UR QL: NEGATIVE
PLATELET # BLD AUTO: 117 THOUSANDS/UL (ref 149–390)
PMV BLD AUTO: 10.7 FL (ref 8.9–12.7)
POTASSIUM SERPL-SCNC: 4.1 MMOL/L (ref 3.5–5.3)
PROT SERPL-MCNC: 6.8 G/DL (ref 6.4–8.4)
RBC # BLD AUTO: 3.53 MILLION/UL (ref 3.88–5.62)
SODIUM SERPL-SCNC: 135 MMOL/L (ref 135–147)
THC UR QL: NEGATIVE
WBC # BLD AUTO: 4.87 THOUSAND/UL (ref 4.31–10.16)

## 2023-01-05 RX ORDER — FUROSEMIDE 10 MG/ML
40 INJECTION INTRAMUSCULAR; INTRAVENOUS ONCE
Status: COMPLETED | OUTPATIENT
Start: 2023-01-05 | End: 2023-01-05

## 2023-01-05 RX ORDER — PRASUGREL 10 MG/1
5 TABLET, FILM COATED ORAL DAILY
Status: CANCELLED | OUTPATIENT
Start: 2023-01-06

## 2023-01-05 RX ORDER — LABETALOL HYDROCHLORIDE 5 MG/ML
10 INJECTION, SOLUTION INTRAVENOUS EVERY 4 HOURS PRN
Status: DISCONTINUED | OUTPATIENT
Start: 2023-01-05 | End: 2023-01-07 | Stop reason: HOSPADM

## 2023-01-05 RX ORDER — ENOXAPARIN SODIUM 100 MG/ML
40 INJECTION SUBCUTANEOUS DAILY
Status: DISCONTINUED | OUTPATIENT
Start: 2023-01-06 | End: 2023-01-05 | Stop reason: DRUGHIGH

## 2023-01-05 RX ORDER — CARVEDILOL 12.5 MG/1
12.5 TABLET ORAL 2 TIMES DAILY WITH MEALS
Status: DISCONTINUED | OUTPATIENT
Start: 2023-01-05 | End: 2023-01-07 | Stop reason: HOSPADM

## 2023-01-05 RX ORDER — MECLIZINE HCL 12.5 MG/1
25 TABLET ORAL 3 TIMES DAILY PRN
Status: CANCELLED | OUTPATIENT
Start: 2023-01-05

## 2023-01-05 RX ORDER — INSULIN LISPRO 100 [IU]/ML
4 INJECTION, SOLUTION INTRAVENOUS; SUBCUTANEOUS
Status: DISCONTINUED | OUTPATIENT
Start: 2023-01-05 | End: 2023-01-07 | Stop reason: HOSPADM

## 2023-01-05 RX ORDER — SERTRALINE HYDROCHLORIDE 25 MG/1
25 TABLET, FILM COATED ORAL
Status: DISCONTINUED | OUTPATIENT
Start: 2023-01-05 | End: 2023-01-06

## 2023-01-05 RX ORDER — ENOXAPARIN SODIUM 100 MG/ML
30 INJECTION SUBCUTANEOUS DAILY
Status: DISCONTINUED | OUTPATIENT
Start: 2023-01-06 | End: 2023-01-06

## 2023-01-05 RX ORDER — INSULIN GLARGINE 100 [IU]/ML
14 INJECTION, SOLUTION SUBCUTANEOUS DAILY
Status: CANCELLED | OUTPATIENT
Start: 2023-01-06

## 2023-01-05 RX ORDER — SERTRALINE HYDROCHLORIDE 25 MG/1
25 TABLET, FILM COATED ORAL
Status: CANCELLED | OUTPATIENT
Start: 2023-01-05

## 2023-01-05 RX ORDER — OMEGA-3S/DHA/EPA/FISH OIL/D3 300MG-1000
400 CAPSULE ORAL DAILY
Status: CANCELLED | OUTPATIENT
Start: 2023-01-06

## 2023-01-05 RX ORDER — ATORVASTATIN CALCIUM 40 MG/1
40 TABLET, FILM COATED ORAL
Status: DISCONTINUED | OUTPATIENT
Start: 2023-01-05 | End: 2023-01-07 | Stop reason: HOSPADM

## 2023-01-05 RX ORDER — INSULIN LISPRO 100 [IU]/ML
1-6 INJECTION, SOLUTION INTRAVENOUS; SUBCUTANEOUS
Status: DISCONTINUED | OUTPATIENT
Start: 2023-01-06 | End: 2023-01-07 | Stop reason: HOSPADM

## 2023-01-05 RX ORDER — ASPIRIN 81 MG/1
81 TABLET ORAL DAILY
Status: CANCELLED | OUTPATIENT
Start: 2023-01-06

## 2023-01-05 RX ORDER — INSULIN GLARGINE 100 [IU]/ML
14 INJECTION, SOLUTION SUBCUTANEOUS
Status: DISCONTINUED | OUTPATIENT
Start: 2023-01-06 | End: 2023-01-07 | Stop reason: HOSPADM

## 2023-01-05 RX ORDER — ATORVASTATIN CALCIUM 40 MG/1
40 TABLET, FILM COATED ORAL
Status: CANCELLED | OUTPATIENT
Start: 2023-01-05

## 2023-01-05 RX ORDER — CARVEDILOL 12.5 MG/1
12.5 TABLET ORAL 2 TIMES DAILY WITH MEALS
Status: CANCELLED | OUTPATIENT
Start: 2023-01-06

## 2023-01-05 RX ORDER — INSULIN LISPRO 100 [IU]/ML
4 INJECTION, SOLUTION INTRAVENOUS; SUBCUTANEOUS
Status: CANCELLED | OUTPATIENT
Start: 2023-01-05

## 2023-01-05 RX ORDER — CALCIUM ACETATE 667 MG/1
667 CAPSULE ORAL
Status: CANCELLED | OUTPATIENT
Start: 2023-01-05

## 2023-01-05 RX ADMIN — FUROSEMIDE 40 MG: 10 INJECTION, SOLUTION INTRAMUSCULAR; INTRAVENOUS at 21:13

## 2023-01-05 RX ADMIN — INSULIN LISPRO 4 UNITS: 100 INJECTION, SOLUTION INTRAVENOUS; SUBCUTANEOUS at 17:15

## 2023-01-05 RX ADMIN — CARVEDILOL 12.5 MG: 12.5 TABLET, FILM COATED ORAL at 17:27

## 2023-01-05 RX ADMIN — FUROSEMIDE 40 MG: 10 INJECTION, SOLUTION INTRAMUSCULAR; INTRAVENOUS at 22:16

## 2023-01-05 RX ADMIN — ATORVASTATIN CALCIUM 40 MG: 40 TABLET, FILM COATED ORAL at 17:29

## 2023-01-05 RX ADMIN — SERTRALINE 25 MG: 25 TABLET, FILM COATED ORAL at 21:12

## 2023-01-05 NOTE — ED NOTES
ED attending at bedside speaking with daughter, pt walked to the BR with family without RN aware, called to BR by tech for help to move pt  He was standing at the toilet with gown off and bleeding from his dialysis access in his l arm  Bleeding was controlled by direct pressure and then wrapped with coflex  Daughter states he's always picking at the site making it bleed  Upon exam it appeared he picked a scab off the healing site to make it bleed       Laurel Adame RN  01/05/23 4787

## 2023-01-05 NOTE — ED PROVIDER NOTES
History  Chief Complaint   Patient presents with   • Psychiatric Evaluation     Stroke patient on dialysis, cut dialysis treatments short last Saturday, Tuesday, and today  Last full treatment a week before  Patient telling everyone at dialysis that he does not want to live and pulling off treatment  Unable to get into [facility] soon due to waiting list  Patient denies SI in triage  This is a 70-year-old male with a relevant past medical history of ESRD on dialysis Tuesday Thursday Saturday, diabetes, hypertension, hyperlipidemia, CAD, CVA with only behavioral deficits, presenting to the ED today for a complaint of behavioral disturbances  Per the daughter who is at the bedside he has been experiencing some worsening confusion  He apparently per the daughter has been refusing to complete his dialysis treatments, and has been telling them that he cannot continue this way that he wants to end his life  He denies any SI to me, or in triage  He does not have any complaints at this time  His daughter states that she has been trying to get him a psych eval as an outpatient due to his behavioral issues but has been as yet unsuccessful  Prior to Admission Medications   Prescriptions Last Dose Informant Patient Reported? Taking? Blood Glucose Monitoring Suppl (True Metrix Meter) w/Device KIT   No No   Sig: Use to test blood sugars 3 times daily   Blood Pressure Monitoring (BLOOD PRESSURE CUFF) MISC   No No   Sig: Use to check blood pressure before taking blood pressure medication and 1 hour after and follow instructions provided in discharge instructions based on the readings     Cholecalciferol (Vitamin D3) 1 25 MG (83795 UT) CAPS   No No   Sig: TAKE 1 CAPSULE BY MOUTH ONE TIME PER WEEK   Cinacalcet HCl (SENSIPAR PO)   Yes No   Sig: Take 60 mg by mouth 3 (three) times a week   Patient not taking: Reported on 11/2/2022   Efavirenz (SUSTIVA PO)   Yes No   Sig: efavirenz   Patient not taking: Reported on 2022   Insulin Pen Needle (BD Pen Needle Adina U/F) 32G X 4 MM MISC   No No   Sig: Use 4 times daily   Insulin Syringe-Needle U-100 (B-D INS SYRINGE 0 5CC/30GX1/2") 30G X 1/2" 0 5 ML MISC   No No   Sig: Inject once daily   Lancets Ultra Thin 30G MISC   No No   Sig: Use 3 times a day   Lokelma 10 g PACK   Yes No   Sig: TAKE 10 G BY MOUTH ONCE FOR 1 DOSE  INDICATIONS: HIGH AMOUNT OF POTASSIUM IN THE BLOOD   Patient not taking: Reported on 2022   Methoxy PEG-Epoetin Beta 30 MCG/0 3ML SOSY   Yes No   Si mcg   Patient not taking: Reported on 2/3/5354   ONETOUCH DELICA LANCETS 20C MISC   No No   Sig: by Does not apply route 3 (three) times a day   Semaglutide,0 25 or 0 5MG/DOS, (Ozempic, 0 25 or 0 5 MG/DOSE,) 2 MG/1 5ML SOPN   No No   Sig: Inject 0 25 mg once a week   Patient not taking: Reported on 2022   TORSEMIDE PO   Yes No   Sig: Take 50 mg by mouth Pt takes 50 mg daily on non- dialysis days: sat, sun, Tues, and thr   Pt takes 10 mg of torsemide daily on dialysis days      aspirin (ECOTRIN LOW STRENGTH) 81 mg EC tablet   Yes No   Sig: Take 81 mg by mouth daily Resume on      atorvastatin (LIPITOR) 40 mg tablet   No No   Sig: TAKE 1 TABLET BY MOUTH EVERY DAY WITH DINNER   benzonatate (TESSALON PERLES) 100 mg capsule   No No   Sig: Take 1 capsule (100 mg total) by mouth 3 (three) times a day as needed for cough   Patient not taking: Reported on 2022   calcium acetate (CALPHRON) 667 mg   Yes No   Sig: 3 tablets 3x per day   carvedilol (COREG) 12 5 mg tablet   No No   Sig: Take 1 tablet (12 5 mg total) by mouth 2 (two) times a day with meals   glucose blood (True Metrix Blood Glucose Test) test strip   No No   Sig: Use 1 each 3 (three) times a day Use as instructed   insulin glargine (Lantus) 100 units/mL subcutaneous injection   No No   Sig: Inject 14 Units under the skin daily   insulin lispro (HumaLOG KwikPen) 100 units/mL injection pen   No No   Sig: INJECT 4 UNITS 3 TIMES A DAY WITH MEALS PLUS SCALE (max daily dose 42 units)   isosorbide mononitrate (IMDUR) 30 mg 24 hr tablet   No No   Sig: Take 1 tablet (30 mg total) by mouth daily   Patient not taking: Reported on 11/2/2022   meclizine (ANTIVERT) 25 mg tablet   No No   Sig: Take 1 tablet (25 mg total) by mouth every 8 (eight) hours as needed for dizziness   meclizine (ANTIVERT) 25 mg tablet   No No   Sig: Take 1 tablet (25 mg total) by mouth 3 (three) times a day as needed for dizziness for up to 7 days   prasugrel (EFFIENT) tablet   Yes No   Sig: Take 1 tablet by mouth daily   sertraline (ZOLOFT) 25 mg tablet   No No   Sig: TAKE 1 TABLET BY MOUTH DAILY AT BEDTIME   torsemide (DEMADEX) 100 mg tablet   Yes No   Sig: Take 50 mg by mouth daily   Patient not taking: Reported on 11/2/2022   torsemide (DEMADEX) 100 mg tablet   No No   Sig: Take 1 tablet (100 mg total) by mouth daily for 2 doses   Patient not taking: Reported on 11/2/2022      Facility-Administered Medications: None       Past Medical History:   Diagnosis Date   • Cerebrovascular accident (CVA) due to thrombosis of left middle cerebral artery ([facility] ) 7/29/2018   • Chronic kidney disease    • Diabetes mellitus ([facility] )    • GERD (gastroesophageal reflux disease)    • Hypercholesteremia    • Hyperlipidemia    • Hypertension    • 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   • Neuropathy    • Obesity    • Osteomyelitis ([facility] )     last assessed 11/4/16   • PVC's (premature ventricular contractions)     sees cardiology Dr Chris camargo   • Stroke [facility])     last weeof July 2018 [facility] [place] [facility]   • TIA (transient ischemic attack) 10/28/2018       Past Surgical History:   Procedure Laterality Date   • ABDOMINAL SURGERY     • CARDIAC CATHETERIZATION N/A 5/2/2022    Procedure: Cardiac Coronary Angiogram;  Surgeon: Ian Isaacs MD;  [facility] CARDI[facility];   Service: Cardiology   • CARDIAC CATHETERIZATION N/A 5/2/2022    Procedure: Cardiac pci;  Surgeon: Ian Isaacs MD; [facility]: [facility] CARDIAC [facility]ATH [facility]; Service: Cardiology   • CHOLECYSTECTOMY      Percutaneous   • COLONOSCOPY     • CYSTOSCOPY     • OTHER SURGICAL HISTORY      "stimulator to control bowel movements"   • [place] ESOPHAGOGASTRODUODENOSCOPY TRANSORAL DIAGNOSTIC N/A 9/27/2016    Procedure: ESOPHAGOGASTRODUODENOSCOPY (EGD); Surgeon: Monisha Reese MD;  Location: [facility] GI LAB; Service: Gastroenterology   • [place] LAPAROSCOPY SURG CHOLECYSTECTOMY N/A 2/29/2016    Procedure: LAPAROSCOPIC CHOLECYSTECTOMY ;  Surgeon: Zachary Sr DO;  Location: [facility] Main OR;  Service: General   • ROTATOR CUFF REPAIR Right    • TOE AMPUTATION Right 10/28/2016    Procedure: 3RD TOE AMPUTATION ;  Surgeon: Manuel Ely DPM;  Location: [facility] Main OR;  Service:        Family History   Problem Relation Age of Onset   • Leukemia Mother    • Liver disease Mother    • Lung cancer Mother         heavy smoker - 3 ppd   • Heart disease Father    • Liver disease Father    • Multiple myeloma Sister    • Breast cancer Sister    • Urolithiasis Family    • Alcohol abuse Neg Hx    • Depression Neg Hx    • Drug abuse Neg Hx    • Substance Abuse Neg Hx    • Mental illness Neg Hx      I have reviewed and agree with the history as documented  E-Cigarette/Vaping   • E-Cigarette Use Never User      E-Cigarette/Vaping Substances   • Nicotine No    • THC No    • CBD No    • Flavoring No    • Other No    • Unknown No      Social History     Tobacco Use   • Smoking status: Never   • Smokeless tobacco: Never   Vaping Use   • Vaping Use: Never used   Substance Use Topics   • Alcohol use: Not Currently   • Drug use: No       Review of Systems   Constitutional: Negative for chills and fever  HENT: Negative for ear pain and sore throat  Eyes: Negative for pain and visual disturbance  Respiratory: Negative for cough and shortness of breath  Cardiovascular: Negative for chest pain and palpitations  Gastrointestinal: Negative for abdominal pain and vomiting  Genitourinary: Negative for dysuria and hematuria  Musculoskeletal: Negative for arthralgias and back pain  Skin: Negative for color change and rash  Neurological: Negative for seizures and syncope  Psychiatric/Behavioral: Positive for behavioral problems and confusion  All other systems reviewed and are negative  Physical Exam  Physical Exam  Vitals and nursing note reviewed  Constitutional:       General: He is not in acute distress  Appearance: Normal appearance  He is well-developed  He is obese  He is not ill-appearing  HENT:      Head: Normocephalic and atraumatic  Right Ear: External ear normal       Left Ear: External ear normal       Nose: Nose normal  No congestion or rhinorrhea  Mouth/Throat:      Mouth: Mucous membranes are moist       Pharynx: Oropharynx is clear  Eyes:      General: No scleral icterus  Conjunctiva/sclera: Conjunctivae normal    Cardiovascular:      Rate and Rhythm: Normal rate and regular rhythm  Pulses: Normal pulses  Heart sounds: Normal heart sounds  No murmur heard  Pulmonary:      Effort: Pulmonary effort is normal  No respiratory distress  Breath sounds: Normal breath sounds  No wheezing or rales  Comments: Distant breath sounds,  Abdominal:      General: Abdomen is flat  Bowel sounds are normal  There is no distension  Palpations: Abdomen is soft  There is no mass  Tenderness: There is no abdominal tenderness  Musculoskeletal:         General: Normal range of motion  Cervical back: Normal range of motion and neck supple  No tenderness  Right lower leg: Edema present  Left lower leg: Edema (2+ pitting bilateral lower extremities, at the ankles) present  Skin:     General: Skin is warm and dry  Capillary Refill: Capillary refill takes less than 2 seconds  Neurological:      General: No focal deficit present  Mental Status: He is alert  He is disoriented  Motor: No weakness  Comments: Alert and oriented to person, place, but not time or situation   Psychiatric:      Comments: Flat affect         Vital Signs  ED Triage Vitals [01/05/23 1100]   Temperature Pulse Respirations Blood Pressure SpO2   98 8 °F (37 1 °C) 73 18 140/65 97 %      Temp Source Heart Rate Source Patient Position - Orthostatic VS BP Location FiO2 (%)   Oral Monitor Sitting Right arm --      Pain Score       --           Vitals:    01/05/23 1100   BP: 140/65   Pulse: 73   Patient Position - Orthostatic VS: Sitting         Visual Acuity      ED Medications  Medications - No data to display    Diagnostic Studies  Results Reviewed     None                 No orders to display              Procedures  Procedures         ED Course  ED Course as of 01/05/23 1444   Thu Jan 05, 2023   1348 Pt had preseyncopal episode in the ED bathroom, helped back to bed by raffi, RN  Likely will need medical workup  Medical Decision Making  Is a 66-year-old male patient presenting to the ED today for complaint of behavioral issues  Patient has had increased confusion recently per the daughter who was at the bedside  He also has been making concerning suicidal comments, saying that he does not want to continue on dialysis  Per the daughter he has cut his dialysis treatment short over the past week almost every session, and for that reason she became concerned that he may be trying to harm himself significantly  Per the dialysis nurse however today is the first day that his treatment was cut short, and it was terminated by the dialysis nurse due to him moving too much out of concern that he may infiltrate  Patient denies any chest pain, shortness of breath, or any other significant complaint, but he is not a very good historian, and does not want to be here at our facility    His exam is remarkable for peripheral edema, which seems to be chronic for him, and his lungs though distant did not seem to exhibit any rales  His differential diagnosis includes: Fluid overload versus pneumonia versus electrolyte abnormality versus other metabolic encephalopathy versus chronic behavioral disturbance with exacerbation versus other  His CBC shows a slight anemia which is chronic for him  His metabolic panel shows an elevated creatinine, but this also is chronic for him as he is on dialysis  He does not have any significant electrolyte abnormalities  His troponin is slightly elevated, as is his BNP  He has evidence for pulmonary vascular congestion on his chest x-ray, consistent with fluid overload  He also had a presyncopal episode in the bathroom earlier while here in the ED  He due to his complex medical issues, elevated troponin, fluid overload picture needing dialysis, patient was requested to be hospitalized by the hospitalist provider who accepted without any further orders requested  They were also informed about his issues with his behavior, depression, and we will follow-up with him as an inpatient  Elevated troponin: self-limited or minor problem  Fluid overload: acute illness or injury  Syncope: acute illness or injury  Amount and/or Complexity of Data Reviewed  Labs: ordered  Radiology: ordered and independent interpretation performed  Risk  Prescription drug management  Decision regarding hospitalization  Disposition  Final diagnoses:   None     ED Disposition     None      Follow-up Information    None         Patient's Medications   Discharge Prescriptions    No medications on file       No discharge procedures on file      PDMP Review       Value Time User    PDMP Reviewed  Yes 11/12/2022  6:19 AM Deena Botello MD          ED Provider  Electronically Signed by           Dorothy Connolly MD  01/05/23 7380
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
## 2023-01-05 NOTE — ED NOTES
Pt assisted to the bathroom with POA  POA asked for help and this tech responded  Pt was on toilet bleeding from dialysis port  RN notified and asked to come over to help  Pt stood up and began peeing and then started to blink his eyes and sway his body  Pt then stated he didn't feel good so this tech stayed and held onto pt while wheelchair was obtained  RN aware of situation and was present  RN wheeled pt back to ED room        Nidhi Lagunas  01/05/23 9859

## 2023-01-05 NOTE — ED NOTES
Daughter Mohsen Collins would like to be called for updates from physician [phone]     Armond Landry RN  01/05/23 6018

## 2023-01-06 ENCOUNTER — APPOINTMENT (OUTPATIENT)
Dept: DIALYSIS | Facility: HOSPITAL | Age: 63
End: 2023-01-06

## 2023-01-06 PROBLEM — R45.851 SUICIDAL IDEATION: Status: ACTIVE | Noted: 2023-01-06

## 2023-01-06 PROBLEM — R55 PRE-SYNCOPE: Status: ACTIVE | Noted: 2023-01-06

## 2023-01-06 PROBLEM — F69 BEHAVIOR CONCERN IN ADULT: Status: ACTIVE | Noted: 2018-08-03

## 2023-01-06 LAB
ANION GAP SERPL CALCULATED.3IONS-SCNC: 10 MMOL/L (ref 4–13)
ATRIAL RATE: 71 BPM
BASOPHILS # BLD AUTO: 0.03 THOUSANDS/ÂΜL (ref 0–0.1)
BASOPHILS NFR BLD AUTO: 0 % (ref 0–1)
BUN SERPL-MCNC: 41 MG/DL (ref 5–25)
CALCIUM SERPL-MCNC: 9 MG/DL (ref 8.4–10.2)
CHLORIDE SERPL-SCNC: 97 MMOL/L (ref 96–108)
CO2 SERPL-SCNC: 30 MMOL/L (ref 21–32)
CREAT SERPL-MCNC: 7.19 MG/DL (ref 0.6–1.3)
EOSINOPHIL # BLD AUTO: 0.13 THOUSAND/ÂΜL (ref 0–0.61)
EOSINOPHIL NFR BLD AUTO: 2 % (ref 0–6)
ERYTHROCYTE [DISTWIDTH] IN BLOOD BY AUTOMATED COUNT: 14.8 % (ref 11.6–15.1)
GFR SERPL CREATININE-BSD FRML MDRD: 7 ML/MIN/1.73SQ M
GLUCOSE P FAST SERPL-MCNC: 113 MG/DL (ref 65–99)
GLUCOSE SERPL-MCNC: 113 MG/DL (ref 65–140)
GLUCOSE SERPL-MCNC: 119 MG/DL (ref 65–140)
GLUCOSE SERPL-MCNC: 122 MG/DL (ref 65–140)
GLUCOSE SERPL-MCNC: 153 MG/DL (ref 65–140)
GLUCOSE SERPL-MCNC: 174 MG/DL (ref 65–140)
HCT VFR BLD AUTO: 36.4 % (ref 36.5–49.3)
HGB BLD-MCNC: 11.6 G/DL (ref 12–17)
IMM GRANULOCYTES # BLD AUTO: 0.03 THOUSAND/UL (ref 0–0.2)
IMM GRANULOCYTES NFR BLD AUTO: 0 % (ref 0–2)
LYMPHOCYTES # BLD AUTO: 1.38 THOUSANDS/ÂΜL (ref 0.6–4.47)
LYMPHOCYTES NFR BLD AUTO: 21 % (ref 14–44)
MAGNESIUM SERPL-MCNC: 2 MG/DL (ref 1.9–2.7)
MCH RBC QN AUTO: 30.4 PG (ref 26.8–34.3)
MCHC RBC AUTO-ENTMCNC: 31.9 G/DL (ref 31.4–37.4)
MCV RBC AUTO: 95 FL (ref 82–98)
MONOCYTES # BLD AUTO: 0.65 THOUSAND/ÂΜL (ref 0.17–1.22)
MONOCYTES NFR BLD AUTO: 10 % (ref 4–12)
NEUTROPHILS # BLD AUTO: 4.51 THOUSANDS/ÂΜL (ref 1.85–7.62)
NEUTS SEG NFR BLD AUTO: 67 % (ref 43–75)
NRBC BLD AUTO-RTO: 0 /100 WBCS
P AXIS: 68 DEGREES
PHOSPHATE SERPL-MCNC: 4.6 MG/DL (ref 2.3–4.1)
PLATELET # BLD AUTO: 127 THOUSANDS/UL (ref 149–390)
PMV BLD AUTO: 10.2 FL (ref 8.9–12.7)
POTASSIUM SERPL-SCNC: 4.7 MMOL/L (ref 3.5–5.3)
PR INTERVAL: 174 MS
QRS AXIS: 72 DEGREES
QRSD INTERVAL: 164 MS
QT INTERVAL: 482 MS
QTC INTERVAL: 523 MS
RBC # BLD AUTO: 3.82 MILLION/UL (ref 3.88–5.62)
SODIUM SERPL-SCNC: 137 MMOL/L (ref 135–147)
T WAVE AXIS: 26 DEGREES
VENTRICULAR RATE: 71 BPM
WBC # BLD AUTO: 6.73 THOUSAND/UL (ref 4.31–10.16)

## 2023-01-06 PROCEDURE — 5A1D70Z PERFORMANCE OF URINARY FILTRATION, INTERMITTENT, LESS THAN 6 HOURS PER DAY: ICD-10-PCS | Performed by: INTERNAL MEDICINE

## 2023-01-06 RX ORDER — DIVALPROEX SODIUM 125 MG/1
125 CAPSULE, COATED PELLETS ORAL EVERY 12 HOURS SCHEDULED
Status: DISCONTINUED | OUTPATIENT
Start: 2023-01-06 | End: 2023-01-07 | Stop reason: HOSPADM

## 2023-01-06 RX ORDER — CALCIUM ACETATE 667 MG/1
2001 CAPSULE ORAL
Status: DISCONTINUED | OUTPATIENT
Start: 2023-01-07 | End: 2023-01-07 | Stop reason: HOSPADM

## 2023-01-06 RX ORDER — CINACALCET 30 MG/1
150 TABLET, FILM COATED ORAL 3 TIMES WEEKLY
Status: DISCONTINUED | OUTPATIENT
Start: 2023-01-07 | End: 2023-01-07 | Stop reason: HOSPADM

## 2023-01-06 RX ORDER — HEPARIN SODIUM 5000 [USP'U]/ML
5000 INJECTION, SOLUTION INTRAVENOUS; SUBCUTANEOUS EVERY 8 HOURS SCHEDULED
Status: DISCONTINUED | OUTPATIENT
Start: 2023-01-06 | End: 2023-01-06

## 2023-01-06 RX ORDER — HEPARIN SODIUM 5000 [USP'U]/ML
5000 INJECTION, SOLUTION INTRAVENOUS; SUBCUTANEOUS EVERY 8 HOURS SCHEDULED
Status: DISCONTINUED | OUTPATIENT
Start: 2023-01-07 | End: 2023-01-07 | Stop reason: HOSPADM

## 2023-01-06 RX ORDER — ACETAMINOPHEN 325 MG/1
650 TABLET ORAL EVERY 6 HOURS PRN
Status: DISCONTINUED | OUTPATIENT
Start: 2023-01-06 | End: 2023-01-07 | Stop reason: HOSPADM

## 2023-01-06 RX ADMIN — INSULIN LISPRO 4 UNITS: 100 INJECTION, SOLUTION INTRAVENOUS; SUBCUTANEOUS at 08:27

## 2023-01-06 RX ADMIN — INSULIN GLARGINE 14 UNITS: 100 INJECTION, SOLUTION SUBCUTANEOUS at 08:13

## 2023-01-06 RX ADMIN — CARVEDILOL 12.5 MG: 12.5 TABLET, FILM COATED ORAL at 18:56

## 2023-01-06 RX ADMIN — ATORVASTATIN CALCIUM 40 MG: 40 TABLET, FILM COATED ORAL at 18:56

## 2023-01-06 RX ADMIN — DIVALPROEX SODIUM 125 MG: 125 CAPSULE, COATED PELLETS ORAL at 20:55

## 2023-01-06 RX ADMIN — ENOXAPARIN SODIUM 30 MG: 30 INJECTION SUBCUTANEOUS at 08:13

## 2023-01-06 RX ADMIN — ACETAMINOPHEN 650 MG: 325 TABLET, FILM COATED ORAL at 23:18

## 2023-01-06 RX ADMIN — INSULIN LISPRO 4 UNITS: 100 INJECTION, SOLUTION INTRAVENOUS; SUBCUTANEOUS at 12:08

## 2023-01-06 RX ADMIN — INSULIN LISPRO 4 UNITS: 100 INJECTION, SOLUTION INTRAVENOUS; SUBCUTANEOUS at 18:44

## 2023-01-06 RX ADMIN — INSULIN LISPRO 1 UNITS: 100 INJECTION, SOLUTION INTRAVENOUS; SUBCUTANEOUS at 12:07

## 2023-01-06 RX ADMIN — CARVEDILOL 12.5 MG: 12.5 TABLET, FILM COATED ORAL at 08:13

## 2023-01-06 NOTE — TELEMEDICINE
TeleConsultation - [facility] 58 y o  male MRN: [id number]  Unit/Bed#: S -01 Encounter: [id number]        REQUIRED DOCUMENTATION:     1  This service was provided via Telemedicine  2  Provider located at [place]  3  TeleMed provider: Mahesh Romeo MD   4  Identify all parties in room with patient during tele consult:  Patient, son and daughter  11  Patient was then informed that this was a Telemedicine visit and that the exam was being conducted confidentially over secure lines  My office door was closed  No one else was in the room  Patient acknowledged consent and understanding of privacy and security of the Telemedicine visit, and gave us permission to have the assistant stay in the room in order to assist with the history and to conduct the exam   I informed the patient that I have reviewed their record in Epic and presented the opportunity for them to ask any questions regarding the visit today  The patient agreed to participate  Assessment/Plan     Principal Problem:    Behavior concern in adult  Active Problems:    Type 2 diabetes mellitus with chronic kidney disease on chronic dialysis, with long-term current use of insulin ([facility])    Anxiety associated with depression    ESRD on dialysis ([facility] [address] )    Hypertension    Pre-syncope    Assessment:    Unspecified mood disorder; rule out vascular dementia with disturbance of mood and behavior    Treatment Plan:    Inpatient psychiatric treatment is not indicated at this time as the patient denies suicidal ideation  As family has reported that Zoloft has not been beneficial, consider replacing Zoloft with Depakote sprinkles 125 mg p o  twice daily as mood stabilizer  This may be further titrated if indicated to effect as tolerated  Patient along with his son and daughter give informed consent for this medication  Reconsult psychiatry as needed    The patient would benefit from outpatient psychiatric follow-up as requested by the family for medication management as well as a counseling component to assist the patient in developing more appropriate coping skills for dealing with current life stressors  Current Medications:     Current Facility-Administered Medications   Medication Dose Route Frequency Provider Last Rate   • atorvastatin  40 mg Oral Daily With Dorita Perez MD     • [START ON 1/7/2023] calcium acetate  2,001 mg Oral TID With Meals Cecy Almazan MD     • carvedilol  12 5 mg Oral BID With Meals Taiwo Shin MD     • [START ON 1/7/2023] cinacalcet  150 mg Oral Once per day on Tue Thu Sat Cecy Almazan MD     • heparin (porcine)  5,000 Units Subcutaneous [facility] Anila Carl MD     • insulin glargine  14 Units Subcutaneous Daily With Breakfast Eli López MD     • insulin lispro  1-6 Units Subcutaneous TID [facility] Eli López MD     • insulin lispro  4 Units Subcutaneous TID With Meals Taiwo Shin MD     • labetalol  10 mg Intravenous Q4H PRN Eli López MD     • sertraline  25 mg Oral HS Jose Stern MD         Risks / Benefits of Treatment:    Risks, benefits, and possible side effects of medications explained to patient and patient verbalizes understanding  Inpatient consult to Psychiatry  Consult performed by: Arabella Koo MD  Consult ordered by: Jose Snowden MD        Physician Requesting Consult: Felix Dejesus DO  Principal Problem:Behavior concern in adult    Reason for Consult: Behavior, suicidal ideation      History of Present Illness      Patient is a 58 y o  male who presented to the emergency department where the physician documented the following:  Stroke patient on dialysis, cut dialysis treatments short last Saturday, Tuesday, and today  Last full treatment a week before  Patient telling everyone at dialysis that he does not want to live and pulling off treatment   Unable to get into [facility] soon due to waiting list  Patient denies SI in triage        This is a 79-year-old male with a relevant past medical history of ESRD on dialysis Tuesday Thursday Saturday, diabetes, hypertension, hyperlipidemia, CAD, CVA with only behavioral deficits, presenting to the ED today for a complaint of behavioral disturbances  Per the daughter who is at the bedside he has been experiencing some worsening confusion  He apparently per the daughter has been refusing to complete his dialysis treatments, and has been telling them that he cannot continue this way that he wants to end his life  He denies any SI to me, or in triage  He does not have any complaints at this time  His daughter states that she has been trying to get him a psych eval as an outpatient due to his behavioral issues but has been as yet unsuccessful               Prior to Admission Medications   Prescriptions Last Dose Informant Patient Reported? Taking? Blood Glucose Monitoring Suppl (True Metrix Meter) w/Device KIT     No No   Sig: Use to test blood sugars 3 times daily   Blood Pressure Monitoring (BLOOD PRESSURE CUFF) MISC     No No   Sig: Use to check blood pressure before taking blood pressure medication and 1 hour after and follow instructions provided in discharge instructions based on the readings     Cholecalciferol (Vitamin D3) 1 25 MG (67078 UT) CAPS     No No   Sig: TAKE 1 CAPSULE BY MOUTH ONE TIME PER WEEK   Cinacalcet HCl (SENSIPAR PO)     Yes No   Sig: Take 60 mg by mouth 3 (three) times a week   Patient not taking: Reported on 11/2/2022   Efavirenz (SUSTIVA PO)     Yes No   Sig: efavirenz   Patient not taking: Reported on 11/2/2022   Insulin Pen Needle (BD Pen Needle Adina U/F) 32G X 4 MM MISC     No No   Sig: Use 4 times daily   Insulin Syringe-Needle U-100 (B-D INS SYRINGE 0 5CC/30GX1/2") 30G X 1/2" 0 5 ML MISC     No No   Sig: Inject once daily   Lancets Ultra Thin 30G MISC     No No   Sig: Use 3 times a day   Lokelma 10 g PACK     Yes No   Sig: TAKE 10 G BY MOUTH ONCE FOR 1 DOSE  INDICATIONS: HIGH AMOUNT OF POTASSIUM IN THE BLOOD   Patient not taking: Reported on 2022   Methoxy PEG-Epoetin Beta 30 MCG/0 3ML SOSY     Yes No   Si mcg   Patient not taking: Reported on    ONETOUCH DELICA LANCETS 49M MISC     No No   Sig: by Does not apply route 3 (three) times a day   Semaglutide,0 25 or 0 5MG/DOS, (Ozempic, 0 25 or 0 5 MG/DOSE,) 2 MG/1 5ML SOPN     No No   Sig: Inject 0 25 mg once a week   Patient not taking: Reported on 2022   TORSEMIDE PO     Yes No   Sig: Take 50 mg by mouth Pt takes 50 mg daily on non- dialysis days: sat, sun, Tues, and    Pt takes 10 mg of torsemide daily on dialysis days -      aspirin (ECOTRIN LOW STRENGTH) 81 mg EC tablet     Yes No   Sig: Take 81 mg by mouth daily Resume on      atorvastatin (LIPITOR) 40 mg tablet     No No   Sig: TAKE 1 TABLET BY MOUTH EVERY DAY WITH DINNER   benzonatate (TESSALON PERLES) 100 mg capsule     No No   Sig: Take 1 capsule (100 mg total) by mouth 3 (three) times a day as needed for cough   Patient not taking: Reported on 2022   calcium acetate (CALPHRON) 667 mg     Yes No   Sig: 3 tablets 3x per day   carvedilol (COREG) 12 5 mg tablet     No No   Sig: Take 1 tablet (12 5 mg total) by mouth 2 (two) times a day with meals   glucose blood (True Metrix Blood Glucose Test) test strip     No No   Sig: Use 1 each 3 (three) times a day Use as instructed   insulin glargine (Lantus) 100 units/mL subcutaneous injection     No No   Sig: Inject 14 Units under the skin daily   insulin lispro (HumaLOG KwikPen) 100 units/mL injection pen     No No   Sig: INJECT 4 UNITS 3 TIMES A DAY WITH MEALS PLUS SCALE (max daily dose 42 units)   isosorbide mononitrate (IMDUR) 30 mg 24 hr tablet     No No   Sig: Take 1 tablet (30 mg total) by mouth daily   Patient not taking: Reported on 2022   meclizine (ANTIVERT) 25 mg tablet     No No   Sig: Take 1 tablet (25 mg total) by mouth every 8 (eight) hours as needed for dizziness   meclizine (ANTIVERT) 25 mg tablet     No No   Sig: Take 1 tablet (25 mg total) by mouth 3 (three) times a day as needed for dizziness for up to 7 days   prasugrel (EFFIENT) tablet     Yes No   Sig: Take 1 tablet by mouth daily   sertraline (ZOLOFT) 25 mg tablet     No No   Sig: TAKE 1 TABLET BY MOUTH DAILY AT BEDTIME   torsemide (DEMADEX) 100 mg tablet     Yes No   Sig: Take 50 mg by mouth daily   Patient not taking: Reported on 11/2/2022   torsemide (DEMADEX) 100 mg tablet     No No   Sig: Take 1 tablet (100 mg total) by mouth daily for 2 doses   Patient not taking: Reported on 11/2/2022      Facility-Administered Medications: None         Medical History        Past Medical History:   Diagnosis Date   • Cerebrovascular accident (CVA) due to thrombosis of left [facility] artery [facility] 7/29/2018   • Chronic kidney disease     • Diabetes mellitus ([facility] )     • GERD (gastroesophageal reflux disease)     • Hypercholesteremia     • Hyperlipidemia     • Hypertension     • Infectious viral hepatitis       B as child   • Neuropathy     • Obesity     • Osteomyelitis ([facility] [address] )       last assessed 11/4/16   • PVC's (premature ventricular contractions)       sees cardiology Dr Yosef camargo   • Stroke [facility])       last weeof July 2018 [facility]   • TIA (transient ischemic attack) 10/28/2018            Surgical History         Past Surgical History:   Procedure Laterality Date   • ABDOMINAL SURGERY       • CARDIAC CATHETERIZATION N/A 5/2/2022     Procedure: Cardiac Coronary Angiogram;  Surgeon: Kirill Edmondson MD;  [facility] CARDIAC CATH LAB; Service: Cardiology   • CARDIAC CATHETERIZATION N/A 5/2/2022     Procedure: Cardiac pci;  Surgeon: Kirill Edmondson MD;  [facility]: [facility] CARDIAC CATH [facility];   Service: Cardiology   • CHOLECYSTECTOMY         Percutaneous   • COLONOSCOPY       • CYSTOSCOPY       • OTHER SURGICAL HISTORY         "stimulator to control bowel movements"   • MA ESOPHAGOGASTRODUODENOSCOPY TRANSORAL DIAGNOSTIC N/A 9/27/2016     Procedure: ESOPHAGOGASTRODUODENOSCOPY (EGD); Surgeon: Rudy Concepcion MD;  Location: [facility] GI LAB; Service: Gastroenterology   • MN LAPAROSCOPY SURG CHOLECYSTECTOMY N/A 2/29/2016     Procedure: LAPAROSCOPIC CHOLECYSTECTOMY ;  Surgeon: Mini Thomas DO;  Location: [facility] Main OR;  Service: General   • ROTATOR CUFF REPAIR Right     • TOE AMPUTATION Right 10/28/2016     Procedure: 3RD TOE AMPUTATION ;  Surgeon: Diana Cao DPM;  Location: [facility] Main OR;  Service:             Family History         Family History   Problem Relation Age of Onset   • Leukemia Mother     • Liver disease Mother     • Lung cancer Mother           heavy smoker - 3 ppd   • Heart disease Father     • Liver disease Father     • Multiple myeloma Sister     • Breast cancer Sister     • Urolithiasis Family     • Alcohol abuse Neg Hx     • Depression Neg Hx     • Drug abuse Neg Hx     • Substance Abuse Neg Hx     • Mental illness Neg Hx           I have reviewed and agree with the history as documented            E-Cigarette/Vaping   • E-Cigarette Use Never User              E-Cigarette/Vaping Substances   • Nicotine No     • THC No     • CBD No     • Flavoring No     • Other No     • Unknown No        Social History           Tobacco Use   • Smoking status: Never   • Smokeless tobacco: Never   Vaping Use   • Vaping Use: Never used   Substance Use Topics   • Alcohol use: Not Currently   • Drug use: No         Review of Systems   Constitutional: Negative for chills and fever  HENT: Negative for ear pain and sore throat  Eyes: Negative for pain and visual disturbance  Respiratory: Negative for cough and shortness of breath  Cardiovascular: Negative for chest pain and palpitations  Gastrointestinal: Negative for abdominal pain and vomiting  Genitourinary: Negative for dysuria and hematuria  Musculoskeletal: Negative for arthralgias and back pain  Skin: Negative for color change and rash  Neurological: Negative for seizures and syncope  Psychiatric/Behavioral: Positive for behavioral problems and confusion  All other systems reviewed and are negative  Initially the patient denied any disturbance of mood but after his son and daughter mention that he frequently shows significant mood lability for example crying and in the next moment laughing, he admitted that this was the case  He admitted that when he is crying he feels very sad and when he is laughing he feels very happy  His shoulder reports the same  Past psychiatric history: The patient has been showing his current mood lability since he had a stroke in 2018  Children also report he at times will call a person that he knows very well by the wrong name such as one of his daughters but then immediately corrects himself  This also has occurred since his stroke  He has not had any formal psychiatric evaluation or follow-up according to the patient and his children  Social history: The patient reports he is  and has 4 children 2 of whom live with him  He states everything is fine at home and reports no abuse  He is retired  Family history: Unremarkable    Substance use history: As above    Mental status examination: The patient was alert and sitting up for the assessment  He made good eye contact  Affect was pleasant  Speech was of unremarkable  He was oriented to hospital but did not know which 1  He was oriented to person and date of birth  He was able to tell me that it was January but gave the year as 2005  Thought process was logical and linear  Thought content was reality based  Associations were tight  He did show some memory impairment for example reported it was 2005  He denied having had any suicidal ideation as confirmed by his children  He denies homicidal ideation  He denies hallucinations or other psychotic features  Insight and judgment are fair      Past Medical History:   Diagnosis Date • Cerebrovascular accident (CVA) due to thrombosis of left middle cerebral artery ([facility] [address] ) 7/29/2018   • Chronic kidney disease    • Diabetes mellitus ([facility] [address] )    • GERD (gastroesophageal reflux disease)    • Hypercholesteremia    • Hyperlipidemia    • Hypertension    • 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   • Neuropathy    • Obesity    • Osteomyelitis ([facility] [address] )     last assessed 11/4/16   • PVC's (premature ventricular contractions)     sees cardiology Dr Damari camargo   • Stroke [facility])     last weeof July 2018 [facility]   • TIA (transient ischemic attack) 10/28/2018       Medical Review Of Systems:    Review of Systems    Meds/Allergies     all current active meds have been reviewed  No Known Allergies    Objective     Vital signs in last 24 hours:  Temp:  [98 7 °F (37 1 °C)-99 8 °F (37 7 °C)] 98 7 °F (37 1 °C)  HR:  [74-89] 87  Resp:  [18] 18  BP: (101-200)/(54-95) 101/79      Intake/Output Summary (Last 24 hours) at 1/6/2023 1413  Last data filed at 1/6/2023 0851  Gross per 24 hour   Intake 240 ml   Output --   Net 240 ml           Lab Results: I have personally reviewed all pertinent laboratory/tests results  Imaging Studies: XR chest 1 view portable    Result Date: 1/5/2023  Narrative: CHEST INDICATION:   sob  COMPARISON:  10/24/2022 EXAM PERFORMED/VIEWS:  XR CHEST PORTABLE FINDINGS: Cardiomediastinal silhouette appears unremarkable  The lungs are clear  There is moderate pulmonary vascular congestion  No pneumothorax or pleural effusion  Osseous structures appear within normal limits for patient age  Impression: Moderate pulmonary vascular congestion  This report is in agreement with the preliminary interpretation  Workstation performed: [id number]     CT head without contrast    Result Date: 1/5/2023  Narrative: CT BRAIN - WITHOUT CONTRAST INDICATION:   Delirium ams  COMPARISON:  November 12, 2022 TECHNIQUE:  CT examination of the brain was performed    In addition to axial images, sagittal and coronal 2D reformatted images were created and submitted for interpretation  Radiation dose length product (DLP) for this visit:  1060 mGy-cm   This examination, like all CT scans performed in the [facility], was performed utilizing techniques to minimize radiation dose exposure, including the use of iterative reconstruction and automated exposure control  IMAGE QUALITY:  Diagnostic  FINDINGS: PARENCHYMA: Decreased attenuation is noted in periventricular and subcortical white matter demonstrating an appearance that is statistically most likely to represent mild microangiopathic change  Old lacunar infarcts left basal ganglion  No CT signs of acute infarction  No intracranial mass, mass effect or midline shift  No acute parenchymal hemorrhage  Arterial atherosclerosis  VENTRICLES AND EXTRA-AXIAL SPACES:  Normal for the patient's age  VISUALIZED ORBITS AND PARANASAL SINUSES:  Normal visualized orbits  Mild mucosal thickening of the visualized paranasal sinuses  CALVARIUM AND EXTRACRANIAL SOFT TISSUES:  Normal      Impression: No acute intracranial abnormality  Workstation performed: [id number]     EKG/Pathology/Other Studies:   Lab Results   Component Value Date    VENTRATE 71 01/05/2023    ATRIALRATE 71 01/05/2023    PRINT 174 01/05/2023    QRSDINT 164 01/05/2023    QTINT 482 01/05/2023    QTCINT 523 01/05/2023    PAXIS 68 01/05/2023    QRSAXIS 72 01/05/2023    TWAVEAXIS 26 01/05/2023        Code Status: Level 1 - Full Code  Advance Directive and Living Will:      Power of :    POLST:      Counseling / Coordination of Care: Total floor / unit time spent today 30 minutes  Greater than 50% of total time was spent with the patient and / or family counseling and / or coordination of care  A description of the counseling / coordination of care: Chart review, patient evaluation, coordination communication with staff, nursing and provider

## 2023-01-06 NOTE — CONSULTS
[facility] 58 y o  male MRN: [id number]  Unit/Bed#: S -01 Encounter: [id number]    ASSESSMENT and PLAN:  1  ESRD on HD [place] [facility], TTS):   · Plan for short HD today due to missed HD on Thursday and presence of vascular congestion on CXR  · Next HD is tomorrow  2  Access: L arm AVF    3  Hypertension:   · EDW is 102 3 kg  · BP is controlled  · Continue carvedilol 12 5 mg twice a day  · Restart Torsemide on discharge  4  Anemia:   · Hgb is at goal    · No indication for BAYLEE at this time  5  Mineral and bone disease:   · Continue renal diet  · Continue Cinacalcet 150 mg 3 times per week for 2HPT  · Continue calcium acetate for hyperphosphatemia  6  Anxiety: Psych consult pending  SUMMARY OF RECOMMENDATIONS:  • HD today x 3 hours  • UF 2 kg on HD  • Restart Cinacalcet and Calcium acetate at outpatient doses  • Restart Torsemide on discharge  • Next HD is tomorrow  The above plan was discussed with SLIM  Previous HD records were personally reviewed by me  The images (CXR) were personally reviewed by me in PACS    HISTORY OF PRESENT ILLNESS:  Requesting Physician: Felix Dejesus DO  Reason for Consult: ESRD on HD    Silvana Reyes is a 58 y o  male who was [facility] on 1[facility]5/23 after presenting with abnormal behaviour  A renal consultation is requested today for assistance in the management of ESRD  Silvana Reyes is a known ESRD patient who undergoes maintenance hemodialysis at [facility] on a TTS schedule  Shima Oneliagarcía has a history of hypertension, diabetes mellitus, hyperlipidemia, end-stage renal disease on hemodialysis, coronary artery disease, gastroesophageal reflux disease, cerebrovascular disease, and obesity  He now presents to [address] [place] [address] on January 5, 2023 after being unable to complete dialysis at the dialysis unit    From what I understand, the patient was upset at the dialysis nurse at [facility] and cut his treatment short  According to the family at bedside, the patient has been cutting treatments short at [facility]  However, upon discussion with [facility], I was told that the patient actually completed 4-hour HD treatments on Saturday and Tuesday this past week  Due to the family's concern for his behavior and not having an adequate HD treatment, the patient was brought over to [address] [place] [address] where he was subsequently admitted  On admission, his chest x-ray showed mild vascular congestion  However, the patient denied any shortness of breath and was not noted to be hypoxic  He denied any fever, chills, chest pain, nausea, vomiting, abdominal pain, diarrhea  His is currently pending a psychiatric evaluation  Kassy Lopez PAST MEDICAL HISTORY:  Past Medical History:   Diagnosis Date   • Cerebrovascular accident (CVA) due to thrombosis of left middle cerebral artery ([facility] ) 7/29/2018   • Chronic kidney disease    • Diabetes mellitus ([facility] )    • GERD (gastroesophageal reflux disease)    • Hypercholesteremia    • Hyperlipidemia    • Hypertension    • 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   • Neuropathy    • Obesity    • Osteomyelitis ([facility] )     last assessed 11/4/16   • PVC's (premature ventricular contractions)     sees cardiology Dr Vikash camargo   • Stroke [facility])     last weeof July 2018 [address] [place] [address]   • TIA (transient ischemic attack) 10/28/2018     PAST SURGICAL HISTORY:  Past Surgical History:   Procedure Laterality Date   • ABDOMINAL SURGERY     • CARDIAC CATHETERIZATION N/A 5/2/2022    Procedure: Cardiac Coronary Angiogram;  Surgeon: Jennifer Edward MD;  Location: [facility] CARDIAC CATH LAB; Service: Cardiology   • CARDIAC CATHETERIZATION N/A 5/2/2022    Procedure: Cardiac pci;  Surgeon: Jennifer Edward MD;  Location: [facility] CARDIAC CATH LAB;   Service: Cardiology   • CHOLECYSTECTOMY      Percutaneous   • COLONOSCOPY     • CYSTOSCOPY     • OTHER SURGICAL HISTORY      "stimulator to control bowel movements"   • NC ESOPHAGOGASTRODUODENOSCOPY TRANSORAL DIAGNOSTIC N/A 9/27/2016    Procedure: ESOPHAGOGASTRODUODENOSCOPY (EGD); Surgeon: Zoe Arias MD;  Location: [facility] GI LAB;   Service: Gastroenterology   • NC LAPAROSCOPY SURG CHOLECYSTECTOMY N/A 2/29/2016    Procedure: LAPAROSCOPIC CHOLECYSTECTOMY ;  Surgeon: Gio Miller DO;  Location: [facility] Main OR;  Service: General   • ROTATOR CUFF REPAIR Right    • TOE AMPUTATION Right 10/28/2016    Procedure: 3RD TOE AMPUTATION ;  Surgeon: Mauro Goodson DPM;  Location: [facility] Main OR;  Service:      ALLERGIES:  No Known Allergies    SOCIAL HISTORY:  Social History     Substance and Sexual Activity   Alcohol Use Not Currently     Social History     Substance and Sexual Activity   Drug Use No     Social History     Tobacco Use   Smoking Status Never   Smokeless Tobacco Never     FAMILY HISTORY:  Family History   Problem Relation Age of Onset   • Leukemia Mother    • Liver disease Mother    • Lung cancer Mother         heavy smoker - 3 ppd   • Heart disease Father    • Liver disease Father    • Multiple myeloma Sister    • Breast cancer Sister    • Urolithiasis Family    • Alcohol abuse Neg Hx    • Depression Neg Hx    • Drug abuse Neg Hx    • Substance Abuse Neg Hx    • Mental illness Neg Hx      MEDICATIONS:    Current Facility-Administered Medications:   •  atorvastatin (LIPITOR) tablet 40 mg, 40 mg, Oral, Daily With Jaguar Holcomb MD, 40 mg at 01/05/23 1729  •  carvedilol (COREG) tablet 12 5 mg, 12 5 mg, Oral, BID With Meals, Taiwo Griffith MD, 12 5 mg at 01/06/23 0813  •  heparin (porcine) subcutaneous injection 5,000 Units, 5,000 Units, Subcutaneous, Q8H [facility], Taiwo Feldman MD  •  insulin glargine (LANTUS) subcutaneous injection 14 Units 0 14 mL, 14 Units, Subcutaneous, Daily With Breakfast, Lorna Taylor MD, 14 Units at 01/06/23 0813  •  insulin lispro (HumaLOG) 100 units/mL subcutaneous injection 1-6 Units, 1-6 Units, Subcutaneous, TID AC, 1 Units at 01/06/23 1207 **AND** Fingerstick Glucose (POCT), , , TID AC, Tavo Faith MD  •  insulin lispro (HumaLOG) 100 units/mL subcutaneous injection 4 Units, 4 Units, Subcutaneous, TID With Meals, Taiwo Mendoza MD, 4 Units at 01/06/23 1208  •  labetalol (NORMODYNE) injection 10 mg, 10 mg, Intravenous, Q4H PRN, Tavo Faith MD  •  sertraline (ZOLOFT) tablet 25 mg, 25 mg, Oral, HS, Taiwo Mendoza MD, 25 mg at 01/05/23 2112    REVIEW OF SYSTEMS:  All the systems were reviewed and were negative except as documented on the HPI  PHYSICAL EXAM:  Current Weight: Weight - Scale: 106 kg (233 lb)  First Weight: Weight - Scale: 105 kg (231 lb 7 7 oz)  Vitals:    01/06/23 1107 01/06/23 1109 01/06/23 1110 01/06/23 1113   BP: 155/80 137/75 149/70 101/79   BP Location:       Pulse: 76 77 77 87   Resp:       Temp:       TempSrc:       SpO2: 96% 94% 96% 95%   Weight:           Intake/Output Summary (Last 24 hours) at 1/6/2023 1348  Last data filed at 1/6/2023 0851  Gross per 24 hour   Intake 240 ml   Output --   Net 240 ml     Physical Exam  General: conscious, coherent, cooperative, not in distress  Skin: warm, dry, good turgor  Eyes: pink conjunctivae, no scleral icterus  ENT: moist lips and mucous membranes  Respiratory: equal chest expansion, clear breath sounds  Cardiovascular: distinct heart sounds, normal rate, regular rhythm, no rub  Abdomen: soft, non-tender, non-distended, normoactive bowel sounds  Extremities: trace ankle edema  Genitourinary: no chávez catheter  Neuro: awake, alert, oriented to time, place and person     Psych: anxious     Lab Results:   Results from last 7 days   Lab Units 01/06/23  0437 01/05/23  1330   WBC Thousand/uL 6 73 4 87   HEMOGLOBIN g/dL 11 6* 11 0*   HEMATOCRIT % 36 4* 33 9*   PLATELETS Thousands/uL 127* 117*   POTASSIUM mmol/L 4 7 4 1   CHLORIDE mmol/L 97 95*   CO2 mmol/L 30 31   BUN mg/dL 41* 30*   CREATININE mg/dL 7 19* 5 66*   CALCIUM mg/dL 9 0 9 0   MAGNESIUM mg/dL 2 0  --    PHOSPHORUS mg/dL 4 6*  --    ALK PHOS U/L  --  76   ALT U/L  --  10   AST U/L  --  10*     Lab Results   Component Value Date    CALCIUM 9 0 01/06/2023    PHOS 4 6 (H) 01/06/2023     Other Studies:   Chest x-ray personally reviewed by me in PACS showed vascular congestion

## 2023-01-06 NOTE — PROGRESS NOTES
[facility]  Progress Note - Wilder Rocha 1960, 58 y o  male MRN: [id number]  Unit/Bed#: S -01 Encounter: [id number]  Primary Care Provider: Chester Haque,    Date and time admitted to hospital: 1/5/2023 11:21 AM    * Behavior concern in adult  Assessment & Plan  presenting to the ED 1/5/23 w adult daughter for behavioral disturbances  Per daughter, pt has been experiencing some worsening confusion, abusive toward wife, refusing to complete his dialysis treatments, and has been telling them that he cannot continue this way that he wants to end his life, abusive toward dialysis staff  Has denied SI during this admission  Patient is poor historian  Appears to be at baseline  Likely multifactorial   Left basal ganglia stroke 2018  History of depression  Per wife patient has been forgetful and confused since CVA  Plan  · Psychiatry consulted, appreciate recommendations  · Supportive care  · Pt has been safe this admission and assesses low risk for SI, can downgrade from 1:1 to virtual 1:1  · If information is needed, contact patient's wife daughter or son    ESRD on dialysis [facility])  Assessment & Plan  Lab Results   Component Value Date    EGFR 7 01/06/2023    EGFR 9 01/05/2023    EGFR 7 12/23/2022    CREATININE 7 19 (H) 01/06/2023    CREATININE 5 66 (H) 01/05/2023    CREATININE 7 17 (H) 12/23/2022     On HD M/W/F since Sept 2019    Patient has missed or cut short HD visits for the past week, secondary to worsening behavioral problems, for which family has become concerned, prompting presentation to the hospital  Per nephrology note he still produces urine  He is pursuing kidney transplant, however needs to lose weight     POA    Plan  · Consult nephrology  · Hemodialysis  · Psych consult as above to address behavioral concerns      Type 2 diabetes mellitus with chronic kidney disease on chronic dialysis, with long-term current use of insulin (HCC)  Assessment & Plan  Lab Results   Component Value Date    HGBA1C 5 5 2022       Recent Labs     23  1702 23  2214 23  0737   POCGLU 120 186* 122       Blood Sugar Average: Last 72 hrs:  (P) 523 [id number]       Plan  · Continue home regimen insulin  · 14 units long-acting in the morning  · 4 units short-acting with meals  · Sliding scale insulin  · Hypoglycemia protocol  · Monitor blood glucose  · CHO restricted diet    Hypertension  Assessment & Plan   POA /95    Current Blood Pressure: 164/77  Received furosemide 40 mg IV Once on admission    Plan  · Continue home medications: Carvedilol 12 5 mg BID  · Labetalol IV 10 mg Q4 as needed for SBP greater than 180      Anxiety associated with depression  Assessment & Plan  Continue home sertraline      VTE Pharmacologic Prophylaxis: VTE Score: 3 Moderate Risk (Score 3-4) - Pharmacological DVT Prophylaxis Ordered: enoxaparin (Lovenox)  Patient Centered Rounds: I performed bedside rounds with nursing staff today  Discussions with Specialists or Other Care Team Provider: Nephrology, Psychiatry    Education and Discussions with Family / Patient: Updated  (son) at bedside  Current Length of Stay: 0 day(s)  Current Patient Status: Observation   Discharge Plan: Anticipate discharge tomorrow to home  Code Status: Level 1 - Full Code    Subjective: This morning, Mr Rui Marcelo is seen sitting up at edge of bed in the presence of 1:1 sitter, alert, engaged, in no acute distress  He reports feeling well and has no complaints at this time  Denies any dizziness or lightheadedness, either at rest or while ambulating  Pt has been ambulating independently with walker and requests prescription of walker for home use at discharge  He denies CP, SOB, abd pain, N/V, flank pain, changes in bowel or bladder habits  No new or worsening sxs this morning      Objective:     Vitals:   Temp (24hrs), Av 1 °F (37 3 °C), Min:98 7 °F (37 1 °C), Max:99 8 °F (37 7 °C)    Temp:  [98 7 °F (37 1 °C)-99 8 °F (37 7 °C)] 98 7 °F (37 1 °C)  HR:  [73-89] 84  Resp:  [18] 18  BP: (137-200)/(54-95) 164/77  SpO2:  [93 %-97 %] 93 %  Body mass index is 34 41 kg/m²  Input and Output Summary (last 24 hours): Intake/Output Summary (Last 24 hours) at 1/6/2023 1043  Last data filed at 1/6/2023 0851  Gross per 24 hour   Intake 240 ml   Output --   Net 240 ml       Physical Exam:   Physical Exam  Vitals and nursing note reviewed  Constitutional:       General: He is not in acute distress  Appearance: Normal appearance  He is obese  He is not ill-appearing, toxic-appearing or diaphoretic  HENT:      Head: Normocephalic and atraumatic  Eyes:      General: No scleral icterus  Right eye: No discharge  Left eye: No discharge  Conjunctiva/sclera: Conjunctivae normal    Cardiovascular:      Rate and Rhythm: Normal rate and regular rhythm  Pulses: Normal pulses  Heart sounds: Normal heart sounds  Pulmonary:      Effort: Pulmonary effort is normal  No respiratory distress  Breath sounds: No stridor  No wheezing, rhonchi or rales  Comments: Breath sounds diminished b/l  Chest:      Chest wall: No tenderness  Abdominal:      General: Bowel sounds are normal  There is no distension  Palpations: Abdomen is soft  Tenderness: There is no abdominal tenderness  There is no right CVA tenderness, left CVA tenderness, guarding or rebound  Musculoskeletal:         General: No swelling or tenderness  Right lower leg: No edema  Left lower leg: No edema  Skin:     General: Skin is warm and dry  Neurological:      Mental Status: He is alert     Psychiatric:      Comments: tearful      Additional Data:     Labs:  Results from last 7 days   Lab Units 01/06/23  0437   WBC Thousand/uL 6 73   HEMOGLOBIN g/dL 11 6*   HEMATOCRIT % 36 4*   PLATELETS Thousands/uL 127*   NEUTROS PCT % 67   LYMPHS PCT % 21   MONOS PCT % 10   EOS PCT % 2 Results from last 7 days   Lab Units 01/06/23  0437 01/05/23  1330   SODIUM mmol/L 137 135   POTASSIUM mmol/L 4 7 4 1   CHLORIDE mmol/L 97 95*   CO2 mmol/L 30 31   BUN mg/dL 41* 30*   CREATININE mg/dL 7 19* 5 66*   ANION GAP mmol/L 10 9   CALCIUM mg/dL 9 0 9 0   ALBUMIN g/dL  --  4 0   TOTAL BILIRUBIN mg/dL  --  0 61   ALK PHOS U/L  --  76   ALT U/L  --  10   AST U/L  --  10*   GLUCOSE RANDOM mg/dL 113 133         Results from last 7 days   Lab Units 01/06/23  0737 01/05/23  2214 01/05/23  1702   POC GLUCOSE mg/dl 122 186* 120               Lines/Drains:  Invasive Devices     Peripheral Intravenous Line  Duration           Peripheral IV 01/05/23 Dorsal (posterior); Right Hand <1 day          Line  Duration           Hemodialysis AV Fistula Left Upper arm -- days                  Telemetry:  Telemetry Orders (From admission, onward)             24 Hour Telemetry Monitoring  Continuous x 24 Hours (Telem)        References:    Telemetry Guidelines   Question:  Reason for 24 Hour Telemetry  Answer:  Metabolic/Electrolyte Disturbance with High Probability of Dysrhythmia (K level <3 or >6, or KCL infusion >=10mEq/hr)                 Telemetry Reviewed: Normal Sinus Rhythm  Indication for Continued Telemetry Use: No indication for continued use  Will discontinue                Imaging: Reviewed radiology reports from this admission including: chest xray and CT head and Personally reviewed the following imaging: chest xray    Recent Cultures (last 7 days):         Last 24 Hours Medication List:   Current Facility-Administered Medications   Medication Dose Route Frequency Provider Last Rate   • atorvastatin  40 mg Oral Daily With Gil Alvares MD     • carvedilol  12 5 mg Oral BID With Meals Duglas Edwards MD     • enoxaparin  30 mg Subcutaneous Daily Ephraim Henry MD     • insulin glargine  14 Units Subcutaneous Daily With Breakfast Ephraim Henry MD     • insulin lispro  1-6 Units Subcutaneous TID [facility] M Hu Jaclyn Flores MD     • insulin lispro  4 Units Subcutaneous TID With Meals Maitlde Rocha MD     • labetalol  10 mg Intravenous Q4H PRN Sarah Neil MD     • sertraline  25 mg Oral HS Matilde Rocha MD          Today, Patient Was Seen By: Aashish Lacy MD    **Please Note: This note may have been constructed using a voice recognition system  **

## 2023-01-06 NOTE — CASE MANAGEMENT
Case Management Assessment & Discharge Planning Note    Patient name Godfrey Henry  Location S /S -24 MRN [id number]  : 1960 Date 2023       Current Admission Date: 2023  Current Admission Diagnosis:Behavior concern in adult   Patient Active Problem List    Diagnosis Date Noted   • Pre-syncope 2023   • SOB (shortness of breath) 10/21/2022   • Left arm swelling 10/21/2022   • Nausea & vomiting 2022   • Coronary artery disease involving native coronary artery of native heart without angina pectoris 2022   • ESRD (end stage renal disease) ([facility] ) 06/10/2022   • Electrolyte abnormality 2022   • Chest pain 2022   • Right sided pain and paresthesias 2022   • Elevated troponin with chest pain 2022   • Hypertension 2022   • Penetrating foot wound, left, initial encounter 2022   • Emotional lability 2022   • Enteritis 2022   • Leukocytosis 2022   • Pancytopenia ([facility] ) 2020   • History of 2019 novel coronavirus disease (COVID-19) 2020   • ESRD on dialysis ([place] [address] ) 2020   • Acute kidney injury superimposed on chronic kidney disease ([facility] ) 10/05/2020   • Anemia 10/05/2020   • S/P arteriovenous (AV) fistula creation 2020   • Pre-kidney transplant, listed 2020   • Urge incontinence 2019   • Overactive bladder 2019   • Anxiety associated with depression 2019   • symptomatic hypoglycemia 10/28/2018   • History of stroke 10/04/2018   • Abnormal EEG 2018   • Other constipation 2018   • GERD (gastroesophageal reflux disease) 2018   • Diabetic macular edema ([facility] ) 2018   • Behavior concern in adult 2018   • Elevated alkaline phosphatase level 2018   • Nephrotic range proteinuria 2018   • Type 2 diabetes mellitus with chronic kidney disease on chronic dialysis, with long-term current use of insulin ([facility] ) 2016   • Dizziness 2016   • Mixed hyperlipidemia 02/26/2016   • Diabetic polyneuropathy associated with type 2 diabetes mellitus ([address] ) 01/26/2016      LOS (days): 0  Geometric Mean LOS (GMLOS) (days):   Days to GMLOS:     OBJECTIVE:              Current admission status: Observation       Preferred Pharmacy:   [id number] [place] - [address] [facility]  [address] [facility] [address]  Phone: [phone] Fax: [phone]    Primary Care Provider: Amador Liu DO    Primary Insurance: MEDICARE  Secondary Insurance: [place] CHRONIC [employer]NAL [employer]    ASSESSMENT:  Marisa Denny Proxies    There are no active Health Care Proxies on file  Patient Information  Admitted from[de-identified] Home  Mental Status: Alert  During Assessment patient was accompanied by: Spouse  Assessment information provided by[de-identified] Patient  Primary Caregiver: Self  Support Systems: Family members  [place] Residence: [address] [place] [address] do you live in?: [place] entry access options   Select all that apply : Stairs  Number of steps to enter home : 2  Do the steps have railings?: Yes  Type of Current Residence: [address] [place] home  Upon entering residence, is there a bedroom on the main floor (no further steps)?: No  A bedroom is located on the following floor levels of residence (select all that apply):: 2nd Floor  Upon entering residence, is there a bathroom on the main floor (no further steps)?: No  Indicate which floors of current residence have a bathroom (select all the apply):: 2nd Floor  Number of steps to 2nd floor from main floor: One Flight  In the last 12 months, was there a time when you were not able to pay the mortgage or rent on time?: No  In the last 12 months, how many places have you lived?: 1  In the last 12 months, was there a time when you did not have a steady place to sleep or slept in a shelter (including now)?: No  Homeless/housing insecurity resource given?: N/A  Living Arrangements: Lives w/ Spouse/significant other (and children)  Is patient a ?: No    Activities of Daily Living Prior to Admission  Functional Status: Independent  Completes ADLs independently?: Yes  Ambulates independently?: Yes  Does patient use assisted devices?: No  Does patient currently own DME?: No  Does patient have a history of Outpatient Therapy (PT/OT)?: No  Does the patient have a history of Short-Term Rehab?: No  Does patient have a history of HHC?: No  Does patient currently have [facility]?: No  Pt declines history, however documented previous hx of [facility]    Patient Information Continued  Does patient have prescription coverage?: Yes  Within the past 12 months, you worried that your food would run out before you got the money to buy more : Never true  Within the past 12 months, the food you bought just didn't last and you didn't have money to get more : Never true  Food insecurity resource given?: N/A  Does patient receive dialysis treatments?: Yes ([address] TTS 11am, family transport)  Does patient have a history of substance abuse?: No  Does patient have a history of Mental Health Diagnosis?: Yes (Anxiety w/depression)  Has patient received inpatient treatment related to mental health in the last 2 years?: No    Means of Transportation  Means of Transport to Appts[de-identified] Family transport  In the past 12 months, has lack of transportation kept you from medical appointments or from getting medications?: No  In the past 12 months, has lack of transportation kept you from meetings, work, or from getting things needed for daily living?: No  Was application for public transport provided?: N/A    DISCHARGE DETAILS:    Contacts  Patient Contacts: Patient  Contact Method:  In Person  Reason/Outcome: Continuity of Care, Discharge Planning    Other Referral/Resources/Interventions Provided:  Interventions: None Indicated    Treatment Team Recommendation: Home  Discharge Destination Plan[de-identified] Home  Transport at Discharge : Family

## 2023-01-06 NOTE — ASSESSMENT & PLAN NOTE
Likely multifactorial   Left basal ganglia stroke 2018  History of depression  Per wife patient has been forgetful and confused since CVA  Patient is poor historian  Currently at baseline      Plan  · Supportive care  · If information is needed, contact patient's wife or son  · Continue observation for periodic unsafe behaviors

## 2023-01-06 NOTE — ASSESSMENT & PLAN NOTE
POA /95    Current Blood Pressure: 164/77  Received furosemide 40 mg IV Once on admission    Plan  · Continue home medications: Carvedilol 12 5 mg BID  · Labetalol IV 10 mg Q4 as needed for SBP greater than 180

## 2023-01-06 NOTE — ASSESSMENT & PLAN NOTE
Patient and family report episode of lightheadedness when toileting on 1/5/23  Denies LOC  Reports no history of LOC  Hospital day 1, denies dizz/LH at rest or w ambulation    Orthostatics markedly positive, w 54 mmHg drop from supine to standing at 3 minutes, HR increase 10 bpm  Tele reviewed, no arrythmias, NSR HR 60s-80s, question of ST-segment depressions  Pt denies CP, chest pressure, chest heaviness, SOB  Trops elevated but flat 100 to 90s with negative delta--likely non-cardiac in the setting of known ESRD  EKG on admission showed new T wave inversions, suspicious for possible inferolateral ischemia  Lower suspicion for MI given absence of CP and modest troponin elevation    Plan:  Repeat EKG for r/o ischemia  Consider ECHO and/or cards consult pending EKG results

## 2023-01-06 NOTE — H&P
[facility]  H&P- Rodgerjaxson Spearing 1960, 58 y o  male MRN: [id number]  Unit/Bed#: S -01 Encounter: [id number]  Primary Care Provider: Miladis Wu DO   Date and time admitted to hospital: 1/5/2023 11:21 AM    * ESRD on dialysis [facility])  Assessment & Plan  Lab Results   Component Value Date    EGFR 9 01/05/2023    EGFR 7 12/23/2022    EGFR 6 11/12/2022    CREATININE 5 66 (H) 01/05/2023    CREATININE 7 17 (H) 12/23/2022    CREATININE 8 16 (H) 11/12/2022     Patient has missed today's hemodialysis appointment as well as cutting the last 2 short  Likely secondary to interpersonal issues with one of the nurses  Per nephrology note he still produces urine  He is pursuing kidney transplant, however needs to lose weight   POA    Plan  · Consult nephrology  · Hemodialysis  · Discuss outpatient options with case management      Hypertension  Assessment & Plan   POA /95    Current Blood Pressure: 137/54    Plan  · Continue home medications: Carvedilol 12 5 mg BID  · One-time dose of 40 mg IV furosemide  · Labetalol IV 10 mg Q4 as needed for SBP greater than 180      Cognitive impairment  Assessment & Plan  Likely multifactorial   Left basal ganglia stroke 2018  History of depression  Per wife patient has been forgetful and confused since CVA  Patient is poor historian  Currently at baseline      Plan  · Supportive care  · If information is needed, contact patient's wife or son  · Continue observation for periodic unsafe behaviors    Anxiety associated with depression  Assessment & Plan  Continue outpatient medication regimen    Type 2 diabetes mellitus with chronic kidney disease on chronic dialysis, with long-term current use of insulin [facility])  Assessment & Plan  Lab Results   Component Value Date    HGBA1C 5 5 12/23/2022       Recent Labs     01/05/23  1702   POCGLU 120       Blood Sugar Average: Last 72 hrs:  (P) 120       Plan  · Continue home regimen insulin  · 14 units long-acting in the morning  · 4 units short-acting with meals  · Sliding scale insulin  · Hypoglycemia protocol  · Monitor blood glucose  · CHO restricted diet    VTE Pharmacologic Prophylaxis: VTE Score: 3 Moderate Risk (Score 3-4) - Pharmacological DVT Prophylaxis Ordered: enoxaparin (Lovenox)  Code Status: Level 1 - Full Code   Discussion with family: Updated  (wife) at bedside  Anticipated Length of Stay: Patient will be admitted on an observation basis with an anticipated length of stay of less than 2 midnights secondary to ESRD on hemodialysis  Chief Complaint: Near syncope    History of Present Illness:  Nelson Montez is a 58 y o  male with a PMH of CVA in 2018, PCI in LAD in August, who presents with mood overload  He had an episode of near syncope after going to the bathroom today  Per wife who was present at bedside he did not go to hemodialysis today  He is also cut to his last treatments short  This is probably due to the fact that patient admits he has issues with one of the nurses at his hemodialysis center  Per patient and wife after going to the bathroom he stood up and felt dizzy, as and lightheaded  He denies any sensation of spinning  He denies loss of consciousness  He denies recent illness, travel, chest pain, headache or other issues  Review of Systems:  Review of Systems   Constitutional: Negative for activity change, appetite change, chills, diaphoresis, fatigue and fever  HENT: Negative for congestion, hearing loss and sore throat  Eyes: Negative for visual disturbance  Respiratory: Negative for apnea, choking, chest tightness, shortness of breath, wheezing and stridor  Cardiovascular: Negative for chest pain and palpitations  Gastrointestinal: Negative for abdominal pain, constipation and diarrhea  Genitourinary: Negative for dysuria and hematuria  Skin: Negative for color change  Neurological: Positive for light-headedness   Negative for dizziness, tremors, seizures, speech difficulty and weakness  Past Medical and Surgical History:   Past Medical History:   Diagnosis Date   • Cerebrovascular accident (CVA) due to thrombosis of left middle cerebral artery ([facility] ) 7/29/2018   • Chronic kidney disease    • Diabetes mellitus ([facility] )    • GERD (gastroesophageal reflux disease)    • Hypercholesteremia    • Hyperlipidemia    • Hypertension    • 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   • Neuropathy    • Obesity    • Osteomyelitis ([facility] )     last assessed 11/4/16   • PVC's (premature ventricular contractions)     sees cardiology Dr Tanvir camargo   • Stroke [facility])     last weeof July 2018 [address] [facility]   • TIA (transient ischemic attack) 10/28/2018       Past Surgical History:   Procedure Laterality Date   • ABDOMINAL SURGERY     • CARDIAC CATHETERIZATION N/A 5/2/2022    Procedure: Cardiac Coronary Angiogram;  Surgeon: Tricia Verdugo MD;  Location: [facility] CARDIAC CATH LAB; Service: Cardiology   • CARDIAC CATHETERIZATION N/A 5/2/2022    Procedure: Cardiac pci;  Surgeon: Tricia Verdugo MD;  Location: [facility] CARDIAC CATH LAB; Service: Cardiology   • CHOLECYSTECTOMY      Percutaneous   • COLONOSCOPY     • CYSTOSCOPY     • OTHER SURGICAL HISTORY      "stimulator to control bowel movements"   • [place] ESOPHAGOGASTRODUODENOSCOPY TRANSORAL DIAGNOSTIC N/A 9/27/2016    Procedure: ESOPHAGOGASTRODUODENOSCOPY (EGD); Surgeon: Miladis Chacon MD;  Location: [facility] GI LAB; Service: Gastroenterology   • [place] LAPAROSCOPY SURG CHOLECYSTECTOMY N/A 2/29/2016    Procedure: LAPAROSCOPIC CHOLECYSTECTOMY ;  Surgeon: Nessa Ch DO;  Location: [facility] Main OR;  Service: General   • ROTATOR CUFF REPAIR Right    • TOE AMPUTATION Right 10/28/2016    Procedure: 3RD TOE AMPUTATION ;  Surgeon: Payton Xiong DPM;  Location: [facility] Main OR;  Service:        Meds/Allergies:  Prior to Admission medications    Medication Sig Start Date End Date Taking?  Authorizing Provider   aspirin (Nick Desir LOW STRENGTH) 81 mg EC tablet Take 81 mg by mouth daily Resume on 8/14     Yes Historical Provider, MD   atorvastatin (LIPITOR) 40 mg tablet TAKE 1 TABLET BY MOUTH EVERY DAY WITH DINNER 12/16/22  Yes Chapo Balderrama DO   Blood Glucose Monitoring Suppl (True Metrix Meter) w/Device KIT Use to test blood sugars 3 times daily 7/1/22  Yes Joseph Dugan MD   Blood Pressure Monitoring (BLOOD PRESSURE CUFF) MISC Use to check blood pressure before taking blood pressure medication and 1 hour after and follow instructions provided in discharge instructions based on the readings   8/13/18  Yes Teo Howe MD   calcium acetate (CALPHRON) 667 mg 3 tablets 3x per day 10/17/21  Yes Historical Provider, MD   carvedilol (COREG) 12 5 mg tablet Take 1 tablet (12 5 mg total) by mouth 2 (two) times a day with meals 10/13/22 1/5/23 Yes Paul Craig MD   Cholecalciferol (Vitamin D3) 1 25 MG (36964 UT) CAPS TAKE 1 CAPSULE BY MOUTH ONE TIME PER WEEK 6/3/22  Yes Davion Ansari MD   glucose blood (True Metrix Blood Glucose Test) test strip Use 1 each 3 (three) times a day Use as instructed 7/1/22  Yes Joseph Dugan MD   insulin glargine (Lantus) 100 units/mL subcutaneous injection Inject 14 Units under the skin daily 12/14/22  Yes Joseph Dugan MD   insulin lispro (HumaLOG KwikPen) 100 units/mL injection pen INJECT 4 UNITS 3 TIMES A DAY WITH MEALS PLUS SCALE (max daily dose 42 units) 12/14/22  Yes Joseph Dugan MD   Insulin Pen Needle (BD Pen Needle Adina U/F) 32G X 4 MM MISC Use 4 times daily 12/14/22  Yes Joseph Dugan MD   Insulin Syringe-Needle U-100 (B-D INS SYRINGE 0 5CC/30GX1/2") 30G X 1/2" 0 5 ML MISC Inject once daily 12/14/22  Yes Joseph Dugan MD   Lancets Ultra Thin 30G MISC Use 3 times a day 3/28/22  Yes ALLISON Reyes LANCETS 57V MISC by Does not apply route 3 (three) times a day 11/27/18  Yes Chapo Balderrama,    prasugrel (EFFIENT) tablet Take 1 tablet by mouth daily 8/2/22  Yes Historical Provider, MD   sertraline (ZOLOFT) 25 mg tablet TAKE 1 TABLET BY MOUTH DAILY AT BEDTIME  Patient taking differently: Takes 12 5 mg bid 12/11/22  Yes Raj Auguste DO   TORSEMIDE PO Take 50 mg by mouth Pt takes 50 mg daily on non- dialysis days: sat, sun, Tues, and thrusday  Pt takes 10 mg of torsemide daily on dialysis days m- w- f   10/25/21  Yes Historical Provider, MD   benzonatate (TESSALON PERLES) 100 mg capsule Take 1 capsule (100 mg total) by mouth 3 (three) times a day as needed for cough  Patient not taking: Reported on 11/2/2022 10/21/22   Corine Guevara MD   Cinacalcet HCl (SENSIPAR PO) Take 60 mg by mouth 3 (three) times a week  Patient not taking: Reported on 11/2/2022 6/20/22 6/19/23  Historical Provider, MD   Efavirenz (SUSTIVA PO) efavirenz  Patient not taking: Reported on 11/2/2022    Historical Provider, MD   isosorbide mononitrate (IMDUR) 30 mg 24 hr tablet Take 1 tablet (30 mg total) by mouth daily  Patient not taking: Reported on 11/2/2022 6/12/22 7/14/22  Vinnie Brooks MD   Lokelma 10 g PACK TAKE 10 G BY MOUTH ONCE FOR 1 DOSE   INDICATIONS: HIGH AMOUNT OF POTASSIUM IN THE BLOOD  Patient not taking: Reported on 11/2/2022 7/29/22   Historical Provider, MD   meclizine (ANTIVERT) 25 mg tablet Take 1 tablet (25 mg total) by mouth every 8 (eight) hours as needed for dizziness  Patient not taking: Reported on 1/5/2023 9/22/22   Olya See DO   meclizine (ANTIVERT) 25 mg tablet Take 1 tablet (25 mg total) by mouth 3 (three) times a day as needed for dizziness for up to 7 days 11/12/22 11/19/22  Bridgette Mathews MD   Methoxy PEG-Epoetin Beta 30 MCG/0 3ML SOSY 30 mcg  Patient not taking: Reported on 1/3/2023 5/16/22 5/15/23  Historical Provider, MD   Semaglutide,0 25 or 0 5MG/DOS, (Ozempic, 0 25 or 0 5 MG/DOSE,) 2 MG/1 5ML SOPN Inject 0 25 mg once a week  Patient not taking: Reported on 11/2/2022 7/1/22   Diana Griffith MD   torsemide (DEMADEX) 100 mg tablet Take 50 mg by mouth daily  Patient not taking: Reported on 11/2/2022 7/19/22   Historical Provider, MD   torsemide (DEMADEX) 100 mg tablet Take 1 tablet (100 mg total) by mouth daily for 2 doses  Patient not taking: Reported on 11/2/2022 9/3/22 11/2/22  Carla Langley MD     I have reviewed home medications with patient family member  Allergies: No Known Allergies    Social History:  Marital Status: /Civil Union   Occupation: Retired  Patient Pre-hospital Living Situation: Home, With spouse  Patient Pre-hospital Level of Mobility: walks  Patient Pre-hospital Diet Restrictions: 100 restricted  Substance Use History:   Social History     Substance and Sexual Activity   Alcohol Use Not Currently     Social History     Tobacco Use   Smoking Status Never   Smokeless Tobacco Never     Social History     Substance and Sexual Activity   Drug Use No       Family History:  Family History   Problem Relation Age of Onset   • Leukemia Mother    • Liver disease Mother    • Lung cancer Mother         heavy smoker - 3 ppd   • Heart disease Father    • Liver disease Father    • Multiple myeloma Sister    • Breast cancer Sister    • Urolithiasis Family    • Alcohol abuse Neg Hx    • Depression Neg Hx    • Drug abuse Neg Hx    • Substance Abuse Neg Hx    • Mental illness Neg Hx        Physical Exam:     Vitals:   Blood Pressure: 137/54 (01/05/23 1926)  Pulse: 80 (01/05/23 1926)  Temperature: 98 8 °F (37 1 °C) (01/05/23 1100)  Temp Source: Oral (01/05/23 1100)  Respirations: 18 (01/05/23 1715)  Weight - Scale: 106 kg (233 lb) (01/05/23 1926)  SpO2: 94 % (01/05/23 1926)    Physical Exam  Vitals and nursing note reviewed  Constitutional:       General: He is not in acute distress  Appearance: He is obese  He is not ill-appearing, toxic-appearing or diaphoretic  HENT:      Head: Normocephalic and atraumatic        Right Ear: External ear normal       Left Ear: External ear normal       Nose: Nose normal       Mouth/Throat: Mouth: Mucous membranes are moist       Pharynx: No oropharyngeal exudate  Eyes:      General: No scleral icterus  Conjunctiva/sclera: Conjunctivae normal    Cardiovascular:      Rate and Rhythm: Normal rate and regular rhythm  Pulses: Normal pulses  Heart sounds: Normal heart sounds  No murmur heard  No friction rub  No gallop  Pulmonary:      Effort: Pulmonary effort is normal  No respiratory distress  Breath sounds: Normal breath sounds  No stridor  No wheezing, rhonchi or rales  Abdominal:      General: There is no distension  Palpations: Abdomen is soft  Tenderness: There is no abdominal tenderness  There is no guarding or rebound  Musculoskeletal:         General: No deformity or signs of injury  Cervical back: Neck supple  No tenderness  Right lower leg: No edema  Left lower leg: No edema  Lymphadenopathy:      Cervical: No cervical adenopathy  Skin:     General: Skin is warm and dry  Capillary Refill: Capillary refill takes less than 2 seconds  Coloration: Skin is not jaundiced or pale  Neurological:      General: No focal deficit present  Mental Status: He is alert     Psychiatric:         Behavior: Behavior normal           Additional Data:     Lab Results:  Results from last 7 days   Lab Units 01/05/23  1330   WBC Thousand/uL 4 87   HEMOGLOBIN g/dL 11 0*   HEMATOCRIT % 33 9*   PLATELETS Thousands/uL 117*   NEUTROS PCT % 56   LYMPHS PCT % 26   MONOS PCT % 14*   EOS PCT % 2     Results from last 7 days   Lab Units 01/05/23  1330   SODIUM mmol/L 135   POTASSIUM mmol/L 4 1   CHLORIDE mmol/L 95*   CO2 mmol/L 31   BUN mg/dL 30*   CREATININE mg/dL 5 66*   ANION GAP mmol/L 9   CALCIUM mg/dL 9 0   ALBUMIN g/dL 4 0   TOTAL BILIRUBIN mg/dL 0 61   ALK PHOS U/L 76   ALT U/L 10   AST U/L 10*   GLUCOSE RANDOM mg/dL 133         Results from last 7 days   Lab Units 01/05/23  1702   POC GLUCOSE mg/dl 120               Lines/Drains:  Invasive Devices Peripheral Intravenous Line  Duration           Peripheral IV 01/05/23 Dorsal (posterior); Right Hand <1 day          Line  Duration           Hemodialysis AV Fistula Left Upper arm -- days                    Imaging: Reviewed radiology reports from this admission including: chest xray and Personally reviewed the following imaging: chest xray  XR chest 1 view portable   ED Interpretation by Benny Peguero MD (01/05 2617)   Vascular congestion, cephalization, consistent with fluid overload        Final Result by Neida Jarquin MD (01/05 1641)      Moderate pulmonary vascular congestion  This report is in agreement with the preliminary interpretation  Workstation performed: [id number]         CT head without contrast   Final Result by Richard Domínguez DO (01/05 1521)   No acute intracranial abnormality  Workstation performed: [id number]             EKG and Other Studies Reviewed on Admission:   · EKG: NSR  HR 69     ** Please Note: This note has been constructed using a voice recognition system   **

## 2023-01-06 NOTE — ASSESSMENT & PLAN NOTE
POA /95    Current Blood Pressure: 137/54    Plan  · Continue home medications: Carvedilol 12 5 mg BID  · One-time dose of 40 mg IV furosemide  · Labetalol IV 10 mg Q4 as needed for SBP greater than 180

## 2023-01-06 NOTE — ASSESSMENT & PLAN NOTE
Lab Results   Component Value Date    HGBA1C 5 5 12/23/2022       Recent Labs     01/05/23  1702   POCGLU 120       Blood Sugar Average: Last 72 hrs:  (P) 120       Plan  · Continue home regimen insulin  · 14 units long-acting in the morning  · 4 units short-acting with meals  · Sliding scale insulin  · Hypoglycemia protocol  · Monitor blood glucose  · CHO restricted diet

## 2023-01-06 NOTE — ASSESSMENT & PLAN NOTE
Lab Results   Component Value Date    EGFR 9 01/05/2023    EGFR 7 12/23/2022    EGFR 6 11/12/2022    CREATININE 5 66 (H) 01/05/2023    CREATININE 7 17 (H) 12/23/2022    CREATININE 8 16 (H) 11/12/2022     Patient has missed today's hemodialysis appointment as well as cutting the last 2 short  Likely secondary to interpersonal issues with one of the nurses  Per nephrology note he still produces urine  He is pursuing kidney transplant, however needs to lose weight     POA    Plan  · Consult nephrology  · Hemodialysis  · Discuss outpatient options with case management

## 2023-01-06 NOTE — ASSESSMENT & PLAN NOTE
Lab Results   Component Value Date    HGBA1C 5 5 12/23/2022       Recent Labs     01/05/23  1702 01/05/23  2214 01/06/23  0737   POCGLU 120 186* 122       Blood Sugar Average: Last 72 hrs:  (P) 305 [id number]       Plan  · Continue home regimen insulin  · 14 units long-acting in the morning  · 4 units short-acting with meals  · Sliding scale insulin  · Hypoglycemia protocol  · Monitor blood glucose  · CHO restricted diet

## 2023-01-06 NOTE — ASSESSMENT & PLAN NOTE
Lab Results   Component Value Date    EGFR 7 01/06/2023    EGFR 9 01/05/2023    EGFR 7 12/23/2022    CREATININE 7 19 (H) 01/06/2023    CREATININE 5 66 (H) 01/05/2023    CREATININE 7 17 (H) 12/23/2022     On HD M/W/F since Sept 2019    Patient has missed or cut short HD visits for the past week, secondary to worsening behavioral problems, for which family has become concerned, prompting presentation to the hospital  Per nephrology note he still produces urine  He is pursuing kidney transplant, however needs to lose weight     POA    Plan  · Consult nephrology  · Hemodialysis  · Psych consult as above to address behavioral concerns
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
## 2023-01-06 NOTE — PLAN OF CARE
Hemodialysis treatment planned for 180 minutes using a 2 K+ bath for potassium 4 7 this morning  Fluid goal 2500 ml/2000 ml net as ordered  Post-Dialysis RN Treatment Note    Blood Pressure:  Pre 145/53 mm/Hg  Post 147/54 mmHg   EDW  102 3kg    Weight:  Pre 105 2 kg   Post 103 4 kg   Mode of weight measurement:    Bed scale   Volume Removed:   1568 ml/1000 ml net    Treatment duration:   150 minutes    NS given:    100 ml x 1 for c/o leg cramping    Treatment shortened:   yes   Medications given during Rx:   none   Estimated Kt/V:   none   Access type:    NAVEEN fistula   Access Issues:    Maintains 400 bfr  Patient restless at times, bending left arm causing frequent alarms     Report called to primary nurse:   Verbal          Problem: METABOLIC, FLUID AND ELECTROLYTES - ADULT  Goal: Electrolytes maintained within normal limits  Description: INTERVENTIONS:  - Monitor labs and assess patient for signs and symptoms of electrolyte imbalances  - Administer electrolyte replacement as ordered  - Monitor response to electrolyte replacements, including repeat lab results as appropriate  - Instruct patient on fluid and nutrition as appropriate  Outcome: Progressing  Goal: Fluid balance maintained  Description: INTERVENTIONS:  - Monitor labs   - Monitor I/O and WT  - Instruct patient on fluid and nutrition as appropriate  - Assess for signs & symptoms of volume excess or deficit  Outcome: Progressing

## 2023-01-06 NOTE — PLAN OF CARE
Problem: PAIN - ADULT  Goal: Verbalizes/displays adequate comfort level or baseline comfort level  Description: Interventions:  - Encourage patient to monitor pain and request assistance  - Assess pain using appropriate pain scale  - Administer analgesics based on type and severity of pain and evaluate response  - Implement non-pharmacological measures as appropriate and evaluate response  - Consider cultural and social influences on pain and pain management  - Notify physician/advanced practitioner if interventions unsuccessful or patient reports new pain  Outcome: Progressing     Problem: INFECTION - ADULT  Goal: Absence or prevention of progression during hospitalization  Description: INTERVENTIONS:  - Assess and monitor for signs and symptoms of infection  - Monitor lab/diagnostic results  - Monitor all insertion sites, i e  indwelling lines, tubes, and drains  - Monitor endotracheal if appropriate and nasal secretions for changes in amount and color  - [place] appropriate cooling/warming therapies per order  - Administer medications as ordered  - Instruct and encourage patient and family to use good hand hygiene technique  - Identify and instruct in appropriate isolation precautions for identified infection/condition  Outcome: Progressing  Goal: Absence of fever/infection during neutropenic period  Description: INTERVENTIONS:  - Monitor WBC    Outcome: Progressing     Problem: SAFETY ADULT  Goal: Patient will remain free of falls  Description: INTERVENTIONS:  - Educate patient/family on patient safety including physical limitations  - Instruct patient to call for assistance with activity   - Consult OT/PT to assist with strengthening/mobility   - Keep Call bell within reach  - Keep bed low and locked with side rails adjusted as appropriate  - Keep care items and personal belongings within reach  - Initiate and maintain comfort rounds  - Make Fall Risk Sign visible to staff  - Offer Toileting every  Hours, in advance of need  - Initiate/Maintain alarm  - Obtain necessary fall risk management equipment:   - Apply yellow socks and bracelet for high fall risk patients  - Consider moving patient to room near nurses station  Outcome: Progressing  Goal: Maintain or return to baseline ADL function  Description: INTERVENTIONS:  -  Assess patient's ability to carry out ADLs; assess patient's baseline for ADL function and identify physical deficits which impact ability to perform ADLs (bathing, care of mouth/teeth, toileting, grooming, dressing, etc )  - Assess/evaluate cause of self-care deficits   - Assess range of motion  - Assess patient's mobility; develop plan if impaired  - Assess patient's need for assistive devices and provide as appropriate  - Encourage maximum independence but intervene and supervise when necessary  - Involve family in performance of ADLs  - Assess for home care needs following discharge   - Consider OT consult to assist with ADL evaluation and planning for discharge  - Provide patient education as appropriate  Outcome: Progressing  Goal: Maintains/Returns to pre admission functional level  Description: INTERVENTIONS:  - Perform BMAT or MOVE assessment daily    - Set and communicate daily mobility goal to care team and patient/family/caregiver  - Collaborate with rehabilitation services on mobility goals if consulted  - Perform Range of Motion  times a day  - Reposition patient every  hours    - Dangle patient  times a day  - Stand patient  times a day  - Ambulate patient  times a day  - Out of bed to chair  times a day   - Out of bed for meals  times a day  - Out of bed for toileting  - Record patient progress and toleration of activity level   Outcome: Progressing     Problem: DISCHARGE PLANNING  Goal: Discharge to home or other facility with appropriate resources  Description: INTERVENTIONS:  - Identify barriers to discharge w/patient and caregiver  - Arrange for needed discharge resources and transportation as appropriate  - Identify discharge learning needs (meds, wound care, etc )  - Arrange for interpretive services to assist at discharge as needed  - Refer to Case Management Department for coordinating discharge planning if the patient needs post-hospital services based on physician/advanced practitioner order or complex needs related to functional status, cognitive ability, or social support system  Outcome: Progressing     Problem: Knowledge Deficit  Goal: Patient/family/caregiver demonstrates understanding of disease process, treatment plan, medications, and discharge instructions  Description: Complete learning assessment and assess knowledge base    Interventions:  - Provide teaching at level of understanding  - Provide teaching via preferred learning methods  Outcome: Progressing     Problem: Prexisting or High Potential for Compromised Skin Integrity  Goal: Skin integrity is maintained or improved  Description: INTERVENTIONS:  - Identify patients at risk for skin breakdown  - Assess and monitor skin integrity  - Assess and monitor nutrition and hydration status  - Monitor labs   - Assess for incontinence   - Turn and reposition patient  - Assist with mobility/ambulation  - Relieve pressure over bony prominences  - Avoid friction and shearing  - Provide appropriate hygiene as needed including keeping skin clean and dry  - Evaluate need for skin moisturizer/barrier cream  - Collaborate with interdisciplinary team   - Patient/family teaching  - Consider wound care consult   Outcome: Progressing     Problem: Potential for Falls  Goal: Patient will remain free of falls  Description: INTERVENTIONS:  - Educate patient/family on patient safety including physical limitations  - Instruct patient to call for assistance with activity   - Consult OT/PT to assist with strengthening/mobility   - Keep Call bell within reach  - Keep bed low and locked with side rails adjusted as appropriate  - Keep care items and personal belongings within reach  - Initiate and maintain comfort rounds  - Make Fall Risk Sign visible to staff  - Offer Toileting every Hours, in advance of need  - Initiate/Maintain alarm  - Obtain necessary fall risk management equipment:   - Apply yellow socks and bracelet for high fall risk patients  - Consider moving patient to room near nurses station  Outcome: Progressing     Problem: Nutrition/Hydration-ADULT  Goal: Nutrient/Hydration intake appropriate for improving, restoring or maintaining nutritional needs  Description: Monitor and assess patient's nutrition/hydration status for malnutrition  Collaborate with interdisciplinary team and initiate plan and interventions as ordered  Monitor patient's weight and dietary intake as ordered or per policy  Utilize nutrition screening tool and intervene as necessary  Determine patient's food preferences and provide high-protein, high-caloric foods as appropriate       INTERVENTIONS:  - Monitor oral intake, urinary output, labs, and treatment plans  - Assess nutrition and hydration status and recommend course of action  - Evaluate amount of meals eaten  - Assist patient with eating if necessary   - Allow adequate time for meals  - Recommend/ encourage appropriate diets, oral nutritional supplements, and vitamin/mineral supplements  - Order, calculate, and assess calorie counts as needed  - Recommend, monitor, and adjust tube feedings and TPN/PPN based on assessed needs  - Assess need for intravenous fluids  - Provide specific nutrition/hydration education as appropriate  - Include patient/family/caregiver in decisions related to nutrition  Outcome: Progressing     Problem: METABOLIC, FLUID AND ELECTROLYTES - ADULT  Goal: Electrolytes maintained within normal limits  Description: INTERVENTIONS:  - Monitor labs and assess patient for signs and symptoms of electrolyte imbalances  - Administer electrolyte replacement as ordered  - Monitor response to electrolyte replacements, including repeat lab results as appropriate  - Instruct patient on fluid and nutrition as appropriate  Outcome: Progressing  Goal: Fluid balance maintained  Description: INTERVENTIONS:  - Monitor labs   - Monitor I/O and WT  - Instruct patient on fluid and nutrition as appropriate  - Assess for signs & symptoms of volume excess or deficit  Outcome: Progressing

## 2023-01-07 ENCOUNTER — APPOINTMENT (INPATIENT)
Dept: DIALYSIS | Facility: HOSPITAL | Age: 63
End: 2023-01-07

## 2023-01-07 VITALS
BODY MASS INDEX: 34.41 KG/M2 | DIASTOLIC BLOOD PRESSURE: 81 MMHG | OXYGEN SATURATION: 91 % | WEIGHT: 233 LBS | TEMPERATURE: 99.1 F | RESPIRATION RATE: 20 BRPM | SYSTOLIC BLOOD PRESSURE: 141 MMHG | HEART RATE: 80 BPM

## 2023-01-07 LAB
ANION GAP SERPL CALCULATED.3IONS-SCNC: 9 MMOL/L (ref 4–13)
BUN SERPL-MCNC: 33 MG/DL (ref 5–25)
CALCIUM SERPL-MCNC: 8.8 MG/DL (ref 8.4–10.2)
CHLORIDE SERPL-SCNC: 96 MMOL/L (ref 96–108)
CO2 SERPL-SCNC: 31 MMOL/L (ref 21–32)
CREAT SERPL-MCNC: 6.16 MG/DL (ref 0.6–1.3)
ERYTHROCYTE [DISTWIDTH] IN BLOOD BY AUTOMATED COUNT: 14.6 % (ref 11.6–15.1)
GFR SERPL CREATININE-BSD FRML MDRD: 8 ML/MIN/1.73SQ M
GLUCOSE SERPL-MCNC: 115 MG/DL (ref 65–140)
GLUCOSE SERPL-MCNC: 122 MG/DL (ref 65–140)
GLUCOSE SERPL-MCNC: 202 MG/DL (ref 65–140)
HCT VFR BLD AUTO: 35.7 % (ref 36.5–49.3)
HGB BLD-MCNC: 11.5 G/DL (ref 12–17)
MAGNESIUM SERPL-MCNC: 2 MG/DL (ref 1.9–2.7)
MCH RBC QN AUTO: 30.3 PG (ref 26.8–34.3)
MCHC RBC AUTO-ENTMCNC: 32.2 G/DL (ref 31.4–37.4)
MCV RBC AUTO: 94 FL (ref 82–98)
MRSA NOSE QL CULT: NORMAL
PHOSPHATE SERPL-MCNC: 4.5 MG/DL (ref 2.3–4.1)
PLATELET # BLD AUTO: 126 THOUSANDS/UL (ref 149–390)
PMV BLD AUTO: 10.4 FL (ref 8.9–12.7)
POTASSIUM SERPL-SCNC: 4.3 MMOL/L (ref 3.5–5.3)
RBC # BLD AUTO: 3.8 MILLION/UL (ref 3.88–5.62)
SODIUM SERPL-SCNC: 136 MMOL/L (ref 135–147)
WBC # BLD AUTO: 5.27 THOUSAND/UL (ref 4.31–10.16)

## 2023-01-07 RX ORDER — DIVALPROEX SODIUM 125 MG/1
125 CAPSULE, COATED PELLETS ORAL EVERY 12 HOURS SCHEDULED
Qty: 60 CAPSULE | Refills: 0 | Status: SHIPPED | OUTPATIENT
Start: 2023-01-07 | End: 2023-01-13 | Stop reason: SDUPTHER

## 2023-01-07 RX ORDER — TORSEMIDE 10 MG/1
50 TABLET ORAL
Refills: 0
Start: 2023-01-08 | End: 2023-01-13 | Stop reason: SDUPTHER

## 2023-01-07 RX ADMIN — INSULIN LISPRO 2 UNITS: 100 INJECTION, SOLUTION INTRAVENOUS; SUBCUTANEOUS at 08:22

## 2023-01-07 RX ADMIN — INSULIN LISPRO 4 UNITS: 100 INJECTION, SOLUTION INTRAVENOUS; SUBCUTANEOUS at 08:22

## 2023-01-07 RX ADMIN — INSULIN GLARGINE 14 UNITS: 100 INJECTION, SOLUTION SUBCUTANEOUS at 08:22

## 2023-01-07 RX ADMIN — CINACALCET 150 MG: 30 TABLET, FILM COATED ORAL at 08:21

## 2023-01-07 RX ADMIN — INSULIN LISPRO 4 UNITS: 100 INJECTION, SOLUTION INTRAVENOUS; SUBCUTANEOUS at 12:15

## 2023-01-07 RX ADMIN — CARVEDILOL 12.5 MG: 12.5 TABLET, FILM COATED ORAL at 08:21

## 2023-01-07 RX ADMIN — DIVALPROEX SODIUM 125 MG: 125 CAPSULE, COATED PELLETS ORAL at 17:44

## 2023-01-07 RX ADMIN — CALCIUM ACETATE 2001 MG: 667 CAPSULE ORAL at 08:21

## 2023-01-07 RX ADMIN — CALCIUM ACETATE 2001 MG: 667 CAPSULE ORAL at 12:15

## 2023-01-07 NOTE — PROGRESS NOTES
[facility] PROGRESS NOTE   Wilder Rocha 58 y o  male MRN: [id number]  Unit/Bed#: S -01 Encounter: [id number]  Reason for Consult: ESRD on HD    ASSESSMENT and PLAN:  1  ESRD on HD [facility], TTS):   • Plan for HD today  2  Access: L arm AVF     3  Hypertension:   • EDW is 102 3 kg  • BP is controlled  • Continue carvedilol 12 5 mg twice a day  • Restart Torsemide 50 mg on non HD days  4  Anemia:   • Hgb is at goal    • No indication for BAYLEE at this time       5  Mineral and bone disease:   • Continue renal diet  • Continue Cinacalcet 150 mg 3 times per week for 2HPT  • Continue calcium acetate for hyperphosphatemia      6  Anxiety: Per primary team and psych  DISPOSITION:  · HD today  · Restart torsemide 50 mg on on HD days  · Stable for discharge after completion of HD today  SUBJECTIVE / 24H INTERVAL HISTORY:  Tearful today and appreciative  No chest pain or shortness of breath    OBJECTIVE:  Current Weight: Weight - Scale: 106 kg (233 lb)  Vitals:    01/06/23 1800 01/06/23 1833 01/06/23 1856 01/06/23 2054   BP: 147/54 (!) 177/95 159/70 (!) 133/49   BP Location:       Pulse: 77 76 78 76   Resp: 18      Temp:    98 5 °F (36 9 °C)   TempSrc:       SpO2:  100%  91%   Weight:           Intake/Output Summary (Last 24 hours) at 1/7/2023 1031  Last data filed at 1/6/2023 1800  Gross per 24 hour   Intake 840 ml   Output 1568 ml   Net -728 ml     General: conscious, cooperative, no distress  Skin: dry  Eyes: pink conjunctivae  ENT: moist mucous membranes  Respiratory: equal chest expansion, clear breath sounds  Cardiovascular: distinct heart sounds, normal rate, regular rhythm, no rub  Abdomen: soft, non tender, non distended, normal bowel sounds  Extremities: no edema  Genitourinary: no chávez catheter  Neuro: awake, alert     Psych: Tearful    Medications:    Current Facility-Administered Medications:   •  acetaminophen (TYLENOL) tablet 650 mg, 650 mg, Oral, Q6H PRN, Elinor Banister Yenni Caceres MD, 650 mg at 01/06/23 2318  •  atorvastatin (LIPITOR) tablet 40 mg, 40 mg, Oral, Daily With Lalito Jalloh MD, 40 mg at 01/06/23 1856  •  calcium acetate (PHOSLO) capsule 2,001 mg, 2,001 mg, Oral, TID With Meals, Sergei Law MD, 2,001 mg at 01/07/23 0724  •  carvedilol (COREG) tablet 12 5 mg, 12 5 mg, Oral, BID With Meals, Taiwo Mckeon MD, 12 5 mg at 01/07/23 0248  •  cinacalcet (SENSIPAR) tablet 150 mg, 150 mg, Oral, Once per day on Tue Thu Sat, Sergei Law MD, 150 mg at 01/07/23 3435  •  divalproex sodium (DEPAKOTE SPRINKLE) capsule 125 mg, 125 mg, Oral, Q12H [address], Mitzi Roberto DO, 125 mg at 01/06/23 2055  •  heparin (porcine) subcutaneous injection 5,000 Units, 5,000 Units, Subcutaneous, Q8H [address], Aashish Lacy MD  •  insulin glargine (LANTUS) subcutaneous injection 14 Units 0 14 mL, 14 Units, Subcutaneous, Daily With Breakfast, Sarah Neil MD, 14 Units at 01/07/23 6360  •  insulin lispro (HumaLOG) 100 units/mL subcutaneous injection 1-6 Units, 1-6 Units, Subcutaneous, TID AC, 2 Units at 01/07/23 4241 **AND** Fingerstick Glucose (POCT), , , TID AC, Sarah Neil MD  •  insulin lispro (HumaLOG) 100 units/mL subcutaneous injection 4 Units, 4 Units, Subcutaneous, TID With Meals, Taiwo Mckeon MD, 4 Units at 01/07/23 3116  •  labetalol (NORMODYNE) injection 10 mg, 10 mg, Intravenous, Q4H PRN, Sarah Neil MD    Laboratory Results:  Results from last 7 days   Lab Units 01/07/23  0515 01/06/23  0437 01/05/23  1330   WBC Thousand/uL 5 27 6 73 4 87   HEMOGLOBIN g/dL 11 5* 11 6* 11 0*   HEMATOCRIT % 35 7* 36 4* 33 9*   PLATELETS Thousands/uL 126* 127* 117*   POTASSIUM mmol/L 4 3 4 7 4 1   CHLORIDE mmol/L 96 97 95*   CO2 mmol/L 31 30 31   BUN mg/dL 33* 41* 30*   CREATININE mg/dL 6 16* 7 19* 5 66*   CALCIUM mg/dL 8 8 9 0 9 0   MAGNESIUM mg/dL 2 0 2 0  --    PHOSPHORUS mg/dL 4 5* 4 6*  --

## 2023-01-07 NOTE — PLAN OF CARE
Post-Dialysis RN Treatment Note    Blood Pressure:  Pre 140/60 mm/Hg  Post 141/81 mmHg   EDW  102 3 kg    Weight:  Pre 103 5 kg   Post 101 4 kg   Mode of weight measurement: Standing Scale   Volume Removed  2000 ml    Treatment duration 240 minutes    NS given  No    Treatment shortened? No   Medications given during Rx None Reported   Estimated Kt/V  Not Applicable   Access type: AV fistula   Access Issues: No    Report called to primary nurse   Yes Mirella Skinner RN    Pt tolerated 4 hrs of dialysis after transferring to the recliner, bp wnl  HD tx plan: 4 hrs removing up to 2L as jose miguel  3K bath for serum K 4 3 1/7  Using NAVEEN av fistula for hd      Problem: METABOLIC, FLUID AND ELECTROLYTES - ADULT  Goal: Electrolytes maintained within normal limits  Description: INTERVENTIONS:  - Monitor labs and assess patient for signs and symptoms of electrolyte imbalances  - Administer electrolyte replacement as ordered  - Monitor response to electrolyte replacements, including repeat lab results as appropriate  - Instruct patient on fluid and nutrition as appropriate  Outcome: Progressing     Problem: METABOLIC, FLUID AND ELECTROLYTES - ADULT  Goal: Fluid balance maintained  Description: INTERVENTIONS:  - Monitor labs   - Monitor I/O and WT  - Instruct patient on fluid and nutrition as appropriate  - Assess for signs & symptoms of volume excess or deficit  Outcome: Progressing

## 2023-01-07 NOTE — DISCHARGE INSTR - AVS FIRST PAGE
Dear Blaise Peña,     It was our pleasure to care for you here at [facility]  It is our hope that we were always able to exceed the expected standards for your care during your stay  You were hospitalized due to ESRD on HD after missing or receiving incomplete HD treatments  You were cared for on the [facility] 3rd floor by Juan Mckeon MD under the service of Rocco Cruz DO with the [facility] who covers for your primary care physician (PCP), Maninder Duff DO, while you were hospitalized  If you have any questions or concerns related to this hospitalization, you may contact us at [phone]  For follow up as well as any medication refills, we recommend that you follow up with your primary care physician  A registered nurse will reach out to you by phone within a few days after your discharge to answer any additional questions that you may have after going home  However, at this time we provide for you here, the most important instructions / recommendations at discharge:     Notable Medication Adjustments -   Please start taking divalproex sodium (Depakote Sprinkle) 1 capsule (125 mg) by mouth every 12 hours  Please stop taking sertraline  Please take torsemide (Demadex) 5 tablets (50 mg total) 4 times a week on nondialysis days Sunday, Monday, Wednesday, Friday  Testing Required after Discharge -   None  Important follow up information -   Please schedule a follow-up appointment with your primary care provider within 1 week of discharge from the hospital  Please keep and complete all scheduled hemodialysis appointments  Other Instructions -   None  Please review this entire after visit summary as additional general instructions including medication list, appointments, activity, diet, any pertinent wound care, and other additional recommendations from your care team that may be provided for you        Sincerely,     Juan Mckeon MD

## 2023-01-07 NOTE — PLAN OF CARE
Problem: PAIN - ADULT  Goal: Verbalizes/displays adequate comfort level or baseline comfort level  Description: Interventions:  - Encourage patient to monitor pain and request assistance  - Assess pain using appropriate pain scale  - Administer analgesics based on type and severity of pain and evaluate response  - Implement non-pharmacological measures as appropriate and evaluate response  - Consider cultural and social influences on pain and pain management  - Notify physician/advanced practitioner if interventions unsuccessful or patient reports new pain  Outcome: Progressing     Problem: INFECTION - ADULT  Goal: Absence or prevention of progression during hospitalization  Description: INTERVENTIONS:  - Assess and monitor for signs and symptoms of infection  - Monitor lab/diagnostic results  - Monitor all insertion sites, i e  indwelling lines, tubes, and drains  - Monitor endotracheal if appropriate and nasal secretions for changes in amount and color  - [place] appropriate cooling/warming therapies per order  - Administer medications as ordered  - Instruct and encourage patient and family to use good hand hygiene technique  - Identify and instruct in appropriate isolation precautions for identified infection/condition  Outcome: Progressing  Goal: Absence of fever/infection during neutropenic period  Description: INTERVENTIONS:  - Monitor WBC    Outcome: Progressing     Problem: SAFETY ADULT  Goal: Patient will remain free of falls  Description: INTERVENTIONS:  - Educate patient/family on patient safety including physical limitations  - Instruct patient to call for assistance with activity   - Consult OT/PT to assist with strengthening/mobility   - Keep Call bell within reach  - Keep bed low and locked with side rails adjusted as appropriate  - Keep care items and personal belongings within reach  - Initiate and maintain comfort rounds  - Make Fall Risk Sign visible to staff  - Offer Toileting every 2 Hours, in advance of need  - Initiate/Maintain bed/ chair alarm  - Obtain necessary fall risk management equipment  - Apply yellow socks and bracelet for high fall risk patients  - Consider moving patient to room near nurses station  Outcome: Progressing  Goal: Maintain or return to baseline ADL function  Description: INTERVENTIONS:  -  Assess patient's ability to carry out ADLs; assess patient's baseline for ADL function and identify physical deficits which impact ability to perform ADLs (bathing, care of mouth/teeth, toileting, grooming, dressing, etc )  - Assess/evaluate cause of self-care deficits   - Assess range of motion  - Assess patient's mobility; develop plan if impaired  - Assess patient's need for assistive devices and provide as appropriate  - Encourage maximum independence but intervene and supervise when necessary  - Involve family in performance of ADLs  - Assess for home care needs following discharge   - Consider OT consult to assist with ADL evaluation and planning for discharge  - Provide patient education as appropriate  Outcome: Progressing  Goal: Maintains/Returns to pre admission functional level  Description: INTERVENTIONS:  - Perform BMAT or MOVE assessment daily    - Set and communicate daily mobility goal to care team and patient/family/caregiver     - Collaborate with rehabilitation services on mobility goals if consulted    Outcome: Progressing     Problem: DISCHARGE PLANNING  Goal: Discharge to home or other facility with appropriate resources  Description: INTERVENTIONS:  - Identify barriers to discharge w/patient and caregiver  - Arrange for needed discharge resources and transportation as appropriate  - Identify discharge learning needs (meds, wound care, etc )  - Arrange for interpretive services to assist at discharge as needed  - Refer to Case Management Department for coordinating discharge planning if the patient needs post-hospital services based on physician/advanced practitioner order or complex needs related to functional status, cognitive ability, or social support system  Outcome: Progressing     Problem: Knowledge Deficit  Goal: Patient/family/caregiver demonstrates understanding of disease process, treatment plan, medications, and discharge instructions  Description: Complete learning assessment and assess knowledge base    Interventions:  - Provide teaching at level of understanding  - Provide teaching via preferred learning methods  Outcome: Progressing     Problem: Prexisting or High Potential for Compromised Skin Integrity  Goal: Skin integrity is maintained or improved  Description: INTERVENTIONS:  - Identify patients at risk for skin breakdown  - Assess and monitor skin integrity  - Assess and monitor nutrition and hydration status  - Monitor labs   - Assess for incontinence   - Turn and reposition patient  - Assist with mobility/ambulation  - Relieve pressure over bony prominences  - Avoid friction and shearing  - Provide appropriate hygiene as needed including keeping skin clean and dry  - Evaluate need for skin moisturizer/barrier cream  - Collaborate with interdisciplinary team   - Patient/family teaching  - Consider wound care consult   Outcome: Progressing     Problem: Potential for Falls  Goal: Patient will remain free of falls  Description: INTERVENTIONS:  - Educate patient/family on patient safety including physical limitations  - Instruct patient to call for assistance with activity   - Consult OT/PT to assist with strengthening/mobility   - Keep Call bell within reach  - Keep bed low and locked with side rails adjusted as appropriate  - Keep care items and personal belongings within reach  - Initiate and maintain comfort rounds  - Make Fall Risk Sign visible to staff  - Offer Toileting every 2 Hours, in advance of need  - Initiate/Maintain bed/chair alarm  - Obtain necessary fall risk management equipment  - Apply yellow socks and bracelet for high fall risk patients  - Consider moving patient to room near nurses station  Outcome: Progressing     Problem: Nutrition/Hydration-ADULT  Goal: Nutrient/Hydration intake appropriate for improving, restoring or maintaining nutritional needs  Description: Monitor and assess patient's nutrition/hydration status for malnutrition  Collaborate with interdisciplinary team and initiate plan and interventions as ordered  Monitor patient's weight and dietary intake as ordered or per policy  Utilize nutrition screening tool and intervene as necessary  Determine patient's food preferences and provide high-protein, high-caloric foods as appropriate       INTERVENTIONS:  - Monitor oral intake, urinary output, labs, and treatment plans  - Assess nutrition and hydration status and recommend course of action  - Evaluate amount of meals eaten  - Assist patient with eating if necessary   - Allow adequate time for meals  - Recommend/ encourage appropriate diets, oral nutritional supplements, and vitamin/mineral supplements  - Order, calculate, and assess calorie counts as needed  - Recommend, monitor, and adjust tube feedings and TPN/PPN based on assessed needs  - Assess need for intravenous fluids  - Provide specific nutrition/hydration education as appropriate  - Include patient/family/caregiver in decisions related to nutrition  Outcome: Progressing     Problem: METABOLIC, FLUID AND ELECTROLYTES - ADULT  Goal: Electrolytes maintained within normal limits  Description: INTERVENTIONS:  - Monitor labs and assess patient for signs and symptoms of electrolyte imbalances  - Administer electrolyte replacement as ordered  - Monitor response to electrolyte replacements, including repeat lab results as appropriate  - Instruct patient on fluid and nutrition as appropriate  Outcome: Progressing  Goal: Fluid balance maintained  Description: INTERVENTIONS:  - Monitor labs   - Monitor I/O and WT  - Instruct patient on fluid and nutrition as appropriate  - Assess for signs & symptoms of volume excess or deficit  Outcome: Progressing

## 2023-01-08 LAB
ATRIAL RATE: 73 BPM
P AXIS: 51 DEGREES
PR INTERVAL: 182 MS
QRS AXIS: -6 DEGREES
QRSD INTERVAL: 154 MS
QT INTERVAL: 460 MS
QTC INTERVAL: 506 MS
T WAVE AXIS: 151 DEGREES
VENTRICULAR RATE: 73 BPM

## 2023-01-08 NOTE — DISCHARGE SUMMARY
[facility]  Discharge- Shiloh Stanton 1960, 58 y o  male MRN: [id number]  Unit/Bed#: S -01 Encounter: [id number]  Primary Care Provider: Ronald Pérez DO   Date and time admitted to hospital: 1/5/2023 11:21 AM    * Behavior concern in adult  Assessment & Plan  presenting to the ED 1/5/23 w adult daughter for behavioral disturbances  Per daughter, pt has been experiencing some worsening confusion, abusive toward wife, refusing to complete his dialysis treatments, and has been telling them that he cannot continue this way that he wants to end his life, abusive toward dialysis staff  Has denied SI during this admission  No behavioral disturbances this admission  Likely multifactorial   Left basal ganglia stroke 2018  History of depression  Per wife patient has been forgetful and confused since CVA  Plan  · Psychiatry consulted, appreciate recommendations  · Pt not in acute crisis, no indication for inpatient rehab  · Discontinue Zoloft for inefficacy  · Start Depakote Sprinkle  · Ambulatory referral to Psych    ESRD on dialysis [facility])  Assessment & Plan  Lab Results   Component Value Date    EGFR 8 01/07/2023    EGFR 7 01/06/2023    EGFR 9 01/05/2023    CREATININE 6 16 (H) 01/07/2023    CREATININE 7 19 (H) 01/06/2023    CREATININE 5 66 (H) 01/05/2023     On HD T/T/S since Sept 2019    Patient has missed or cut short HD visits for the past week, secondary to worsening behavioral problems, for which family has become concerned, prompting presentation to the hospital  Per nephrology note he still produces urine  He is pursuing kidney transplant, however needs to lose weight   POA    Plan  · Nephrology consulted, appreciate recommendations  · Hemodialysis Friday, Saturday this admission, resume o/p schedule at discharge  · Torsemide on non-dialysis days      Pre-syncope  Assessment & Plan  Patient and family report episode of lightheadedness when toileting on 1/5/23  Denies LOC  Reports no history of LOC  Hospital day 1, denies dizz/LH at rest or w ambulation    Orthostatics markedly positive, w 54 mmHg drop from supine to standing at 3 minutes, HR increase 10 bpm  Tele reviewed, no arrythmias, NSR HR 60s-80s  Pt denies CP, chest pressure, chest heaviness, SOB  Trops elevated but flat 100 to 90s with negative delta--likely non-cardiac in the setting of known ESRD  EKG on admission showed new T wave inversions, suspicious for possible inferolateral ischemia  Lower suspicion for MI given absence of CP and modest troponin elevation  No evidence of acute ischemia on repeat EKG    Type 2 diabetes mellitus with chronic kidney disease on chronic dialysis, with long-term current use of insulin [facility])  Assessment & Plan  Lab Results   Component Value Date    HGBA1C 5 5 12/23/2022       Recent Labs     01/06/23  1830 01/06/23  2105 01/07/23  0802 01/07/23  1111   POCGLU 119 174* 202* 122       Blood Sugar Average: Last 72 hrs:  (P) 149 75       Plan  · Resume home regimen at discharge    Hypertension  Assessment & Plan   POA /95    Current Blood Pressure: 141/81  Received furosemide 40 mg IV Once on admission    Plan  · Continue home medications: Carvedilol 12 5 mg BID  · Start torsemide on non-dialysis days      Anxiety associated with depression  Assessment & Plan  DC home sertraline  Start depakote sprinkle      Medical Problems     Resolved Problems  Date Reviewed: 1/5/2023   None       Discharging Resident: Cynthia Ricketts MD  Discharging Attending: No att  providers found  PCP: Joaquin Chowdary DO  Admission Date:   Admission Orders (From admission, onward)     Ordered        01/06/23 1820  Inpatient Admission  Once            01/05/23 1724  Place in Observation  Once                      Discharge Date: 01/07/2023    Consultations During Hospital Stay:  · Nephrology  · Psychiatry    Procedures Performed:   · Hemodialysis x2    Significant Findings / Test Results:   XR chest 1 view portable 1/5/2023: Moderate pulmonary vascular congestion  CT head without contrast 1/5/202: No acute intracranial abnormality  Elevated Cr, BUN, Phos    Incidental Findings:   · None     Test Results Pending at Discharge (will require follow up): · None     Outpatient Tests Requested:  · None    Complications:  None    Reason for Admission: Behavioral concerns, ESRD with missed HD    Hospital Course:   Stefanie Cordero is a 58 y o  male patient who originally presented to the hospital on 1/5/2023 due to family concerns for missed HD and behavioral disturbances  Pt reportedly receiving no complete HD treatments during week of presentation, missing treatment on day of presentation  Family concerned for behavior and emotional lability  Nephrology consulted and arranged for in-patient HD on Hospital Days 1 & 2  Psychiatry consulted, pt evaluated and not in acute crisis, appropriate for o/p referral  Medications changed from Zoloft to Depakote Sprinkle  Pt is safe for discharge to home from hospital, to resume o/p HD T/T/S  Please see above list of diagnoses and related plan for additional information  Condition at Discharge: fair    Discharge Day Visit / Exam:   Subjective: This morning, Mr Oxana Rojo is seen sitting up in bedside chair eating breakfast  No events overnight, states he feels well this morning  No acute complaints or concerns at this time, though remains concerned with wt loss to qualify for kidney transplant  Denies any sxs and brief ROS is negative  Feels well for D/C to home  Vitals: Blood Pressure: 141/81 (01/07/23 1640)  Pulse: 80 (01/07/23 1640)  Temperature: 99 1 °F (37 3 °C) (01/07/23 1645)  Temp Source: Oral (01/07/23 1645)  Respirations: 20 (01/07/23 1640)  Weight - Scale: 106 kg (233 lb) (01/05/23 1926)  SpO2: 91 % (01/06/23 2054)  Exam:   Physical Exam  Vitals and nursing note reviewed  Constitutional:       General: He is not in acute distress  Appearance: Normal appearance   He is obese  He is not ill-appearing, toxic-appearing or diaphoretic  HENT:      Head: Normocephalic and atraumatic  Eyes:      General: No scleral icterus  Right eye: No discharge  Left eye: No discharge  Conjunctiva/sclera: Conjunctivae normal    Cardiovascular:      Rate and Rhythm: Normal rate and regular rhythm  Pulses: Normal pulses  Heart sounds: Normal heart sounds  No murmur heard  No friction rub  No gallop  Pulmonary:      Effort: Pulmonary effort is normal  No respiratory distress  Breath sounds: Normal breath sounds  No stridor  No wheezing, rhonchi or rales  Chest:      Chest wall: No tenderness  Abdominal:      General: Bowel sounds are normal  There is no distension  Palpations: Abdomen is soft  Tenderness: There is no abdominal tenderness  There is no guarding or rebound  Musculoskeletal:         General: No tenderness  Right lower leg: No edema  Left lower leg: No edema  Skin:     General: Skin is warm and dry  Neurological:      Mental Status: He is alert and oriented to person, place, and time  Psychiatric:         Mood and Affect: Mood normal          Behavior: Behavior normal         Discussion with Family: Updated  (wife) at bedside  Discharge instructions/Information to patient and family:   See after visit summary for information provided to patient and family  Provisions for Follow-Up Care:  See after visit summary for information related to follow-up care and any pertinent home health orders  Disposition:   Home    Planned Readmission: None    Discharge Medications:  See after visit summary for reconciled discharge medications provided to patient and/or family        **Please Note: This note may have been constructed using a voice recognition system**

## 2023-01-08 NOTE — ASSESSMENT & PLAN NOTE
Lab Results   Component Value Date    EGFR 8 01/07/2023    EGFR 7 01/06/2023    EGFR 9 01/05/2023    CREATININE 6 16 (H) 01/07/2023    CREATININE 7 19 (H) 01/06/2023    CREATININE 5 66 (H) 01/05/2023     On HD T/T/S since Sept 2019    Patient has missed or cut short HD visits for the past week, secondary to worsening behavioral problems, for which family has become concerned, prompting presentation to the hospital  Per nephrology note he still produces urine  He is pursuing kidney transplant, however needs to lose weight     POA    Plan  · Nephrology consulted, appreciate recommendations  · Hemodialysis Friday, Saturday this admission, resume o/p schedule at discharge  · Torsemide on non-dialysis days

## 2023-01-08 NOTE — ASSESSMENT & PLAN NOTE
Lab Results   Component Value Date    HGBA1C 5 5 12/23/2022       Recent Labs     01/06/23  1830 01/06/23  2105 01/07/23  0802 01/07/23  1111   POCGLU 119 174* 202* 122       Blood Sugar Average: Last 72 hrs:  (P) 149 75       Plan  · Resume home regimen at discharge

## 2023-01-08 NOTE — ASSESSMENT & PLAN NOTE
Patient and family report episode of lightheadedness when toileting on 1/5/23  Denies LOC  Reports no history of LOC  Hospital day 1, denies dizz/LH at rest or w ambulation    Orthostatics markedly positive, w 54 mmHg drop from supine to standing at 3 minutes, HR increase 10 bpm  Tele reviewed, no arrythmias, NSR HR 60s-80s  Pt denies CP, chest pressure, chest heaviness, SOB  Trops elevated but flat 100 to 90s with negative delta--likely non-cardiac in the setting of known ESRD  EKG on admission showed new T wave inversions, suspicious for possible inferolateral ischemia  Lower suspicion for MI given absence of CP and modest troponin elevation  No evidence of acute ischemia on repeat EKG

## 2023-01-08 NOTE — ASSESSMENT & PLAN NOTE
POA /95    Current Blood Pressure: 141/81  Received furosemide 40 mg IV Once on admission    Plan  · Continue home medications: Carvedilol 12 5 mg BID  · Start torsemide on non-dialysis days

## 2023-01-08 NOTE — ASSESSMENT & PLAN NOTE
presenting to the ED 1/5/23 w adult daughter for behavioral disturbances  Per daughter, pt has been experiencing some worsening confusion, abusive toward wife, refusing to complete his dialysis treatments, and has been telling them that he cannot continue this way that he wants to end his life, abusive toward dialysis staff  Has denied SI during this admission  No behavioral disturbances this admission  Likely multifactorial   Left basal ganglia stroke 2018  History of depression  Per wife patient has been forgetful and confused since CVA      Plan  · Psychiatry consulted, appreciate recommendations  · Pt not in acute crisis, no indication for inpatient rehab  · Discontinue Zoloft for inefficacy  · Start Depakote Sprinkle  · Ambulatory referral to Psych

## 2023-01-09 ENCOUNTER — APPOINTMENT (EMERGENCY)
Dept: CT IMAGING | Facility: HOSPITAL | Age: 63
End: 2023-01-09

## 2023-01-09 ENCOUNTER — APPOINTMENT (EMERGENCY)
Dept: RADIOLOGY | Facility: HOSPITAL | Age: 63
End: 2023-01-09

## 2023-01-09 ENCOUNTER — HOSPITAL ENCOUNTER (EMERGENCY)
Facility: HOSPITAL | Age: 63
Discharge: HOME | End: 2023-01-09
Attending: EMERGENCY MEDICINE

## 2023-01-09 VITALS
OXYGEN SATURATION: 97 % | TEMPERATURE: 97.6 F | SYSTOLIC BLOOD PRESSURE: 185 MMHG | DIASTOLIC BLOOD PRESSURE: 79 MMHG | HEART RATE: 78 BPM | RESPIRATION RATE: 16 BRPM

## 2023-01-09 DIAGNOSIS — Z99.2 ESRD (END STAGE RENAL DISEASE) ON DIALYSIS (HCC): ICD-10-CM

## 2023-01-09 DIAGNOSIS — E83.39 HYPERPHOSPHATEMIA: ICD-10-CM

## 2023-01-09 DIAGNOSIS — N18.6 ESRD (END STAGE RENAL DISEASE) ON DIALYSIS (HCC): ICD-10-CM

## 2023-01-09 DIAGNOSIS — R07.9 CHEST PAIN: Primary | ICD-10-CM

## 2023-01-09 LAB
2HR DELTA HS TROPONIN: -6 NG/L
4HR DELTA HS TROPONIN: 6 NG/L
ALBUMIN SERPL BCP-MCNC: 4.1 G/DL (ref 3.5–5)
ALP SERPL-CCNC: 74 U/L (ref 34–104)
ALT SERPL W P-5'-P-CCNC: 11 U/L (ref 7–52)
ANION GAP SERPL CALCULATED.3IONS-SCNC: 8 MMOL/L (ref 4–13)
AST SERPL W P-5'-P-CCNC: 10 U/L (ref 13–39)
ATRIAL RATE: 77 BPM
BASOPHILS # BLD AUTO: 0.04 THOUSANDS/ÂΜL (ref 0–0.1)
BASOPHILS NFR BLD AUTO: 1 % (ref 0–1)
BILIRUB SERPL-MCNC: 0.61 MG/DL (ref 0.2–1)
BNP SERPL-MCNC: 737 PG/ML (ref 0–100)
BUN SERPL-MCNC: 45 MG/DL (ref 5–25)
CALCIUM SERPL-MCNC: 9.5 MG/DL (ref 8.4–10.2)
CARDIAC TROPONIN I PNL SERPL HS: 50 NG/L
CARDIAC TROPONIN I PNL SERPL HS: 56 NG/L
CARDIAC TROPONIN I PNL SERPL HS: 62 NG/L
CHLORIDE SERPL-SCNC: 101 MMOL/L (ref 96–108)
CO2 SERPL-SCNC: 27 MMOL/L (ref 21–32)
CREAT SERPL-MCNC: 7.43 MG/DL (ref 0.6–1.3)
EOSINOPHIL # BLD AUTO: 0.09 THOUSAND/ÂΜL (ref 0–0.61)
EOSINOPHIL NFR BLD AUTO: 2 % (ref 0–6)
ERYTHROCYTE [DISTWIDTH] IN BLOOD BY AUTOMATED COUNT: 14.6 % (ref 11.6–15.1)
GFR SERPL CREATININE-BSD FRML MDRD: 7 ML/MIN/1.73SQ M
GLUCOSE SERPL-MCNC: 109 MG/DL (ref 65–140)
GLUCOSE SERPL-MCNC: 121 MG/DL (ref 65–140)
HCT VFR BLD AUTO: 35.4 % (ref 36.5–49.3)
HGB BLD-MCNC: 11.2 G/DL (ref 12–17)
IMM GRANULOCYTES # BLD AUTO: 0.04 THOUSAND/UL (ref 0–0.2)
IMM GRANULOCYTES NFR BLD AUTO: 1 % (ref 0–2)
LYMPHOCYTES # BLD AUTO: 1.24 THOUSANDS/ÂΜL (ref 0.6–4.47)
LYMPHOCYTES NFR BLD AUTO: 20 % (ref 14–44)
MAGNESIUM SERPL-MCNC: 2.1 MG/DL (ref 1.9–2.7)
MCH RBC QN AUTO: 30.6 PG (ref 26.8–34.3)
MCHC RBC AUTO-ENTMCNC: 31.6 G/DL (ref 31.4–37.4)
MCV RBC AUTO: 97 FL (ref 82–98)
MONOCYTES # BLD AUTO: 0.6 THOUSAND/ÂΜL (ref 0.17–1.22)
MONOCYTES NFR BLD AUTO: 10 % (ref 4–12)
NEUTROPHILS # BLD AUTO: 4.07 THOUSANDS/ÂΜL (ref 1.85–7.62)
NEUTS SEG NFR BLD AUTO: 66 % (ref 43–75)
NRBC BLD AUTO-RTO: 0 /100 WBCS
P AXIS: 62 DEGREES
PHOSPHATE SERPL-MCNC: 4.7 MG/DL (ref 2.3–4.1)
PLATELET # BLD AUTO: 125 THOUSANDS/UL (ref 149–390)
PMV BLD AUTO: 10.1 FL (ref 8.9–12.7)
POTASSIUM SERPL-SCNC: 5.1 MMOL/L (ref 3.5–5.3)
PR INTERVAL: 172 MS
PROT SERPL-MCNC: 7.2 G/DL (ref 6.4–8.4)
QRS AXIS: -20 DEGREES
QRSD INTERVAL: 158 MS
QT INTERVAL: 438 MS
QTC INTERVAL: 495 MS
RBC # BLD AUTO: 3.66 MILLION/UL (ref 3.88–5.62)
SODIUM SERPL-SCNC: 136 MMOL/L (ref 135–147)
T WAVE AXIS: 64 DEGREES
VENTRICULAR RATE: 77 BPM
WBC # BLD AUTO: 6.08 THOUSAND/UL (ref 4.31–10.16)

## 2023-01-09 RX ADMIN — IOHEXOL 85 ML: 350 INJECTION, SOLUTION INTRAVENOUS at 14:34

## 2023-01-09 NOTE — ED ATTENDING ATTESTATION
1/9/2023  IJean Marie DO, saw and evaluated the patient  I have discussed the patient with the resident/non-physician practitioner and agree with the resident's/non-physician practitioner's findings, Plan of Care, and MDM as documented in the resident's/non-physician practitioner's note, except where noted  All available labs and Radiology studies were reviewed  I was present for key portions of any procedure(s) performed by the resident/non-physician practitioner and I was immediately available to provide assistance  At this point I agree with the current assessment done in the Emergency Department  I have conducted an independent evaluation of this patient a history and physical is as follows:    ED Course         Critical Care Time  Procedures          49-year-old male, presents with chest pain dizziness, long history of similar symptoms recent admission for same  ,  Describes chest pain as "I do not know my chest just hurts" ,  I tried to redirect him multiple times at this and he was unable to fully characterize it ,  Patient says he did not want to come here, family made him ,  He says he did not want testing, this was after multiple rounds of blood work and CTA were performed ,  All unremarkable  Patient does have a elevated heart score, this seems to be his baseline, I do not see any new pathology did discuss possible admission he does not want admission he wants to go home, I think this is completely reasonable especially considering the recent admission for similar events  Will discharge

## 2023-01-09 NOTE — ED NOTES
Pulled EKG leads off along with electrodes  Refused to be placed   Denies pain at present and is in and out of bed at will     Faith Trudy, [address] [facility]  01/09/23 9505

## 2023-01-09 NOTE — ED NOTES
2hr troponin and 4hr troponins resulted in incorrect times  2hr troponin resulted as 62 and 4hr troponin resulted in 50  Provider and PA-C aware        Gerardo Ernandez RN  01/09/23 0352

## 2023-01-09 NOTE — ED NOTES
Pt up walked to the bathroom with wife without difficulty, is currently sitting on a chair in the room occasionally yelling  When pt was checked on wife said " he always does that for no reason"        Bea Welch RN  01/09/23 1510

## 2023-01-09 NOTE — ED PROVIDER NOTES
History  Chief Complaint   Patient presents with   • Chest Pain     Pt report 7/10 stabbing chest pain x 1 hr with dizziness  Denies history, n/v Daughter reports h/o MI  Patient is a 59 y/o male with a PMHx of CVA, ESRD (on dialysis Tues/Thurs/Saturday), DM 2, GERD, HLD, HTN and neuropathy, presenting to the ED for evaluation of chest pain x1 hour  Patient states that he developed sudden onset midsternal chest pain while on the couch at home about 1 hour prior to arrival   He describes the pain as "stabbing" and says it does not radiate  He denies worsening pain with exertion or respirations  He states that upon arrival to the ED he is now feeling short of breath  He denies any lower extremity edema or orthopnea  Patient also mentioned feeling somewhat lightheaded which patient and family say is a chronic issue with positional changes  He denies any dizziness or lightheadedness at this time  He denies any fevers, chills, nausea, vomiting, palpitations, abdominal pain, diarrhea, constipation or urinary complaints  Patient's most recent dialysis session was 2 days ago and he is due for his next dialysis session tomorrow  Of note, patient was just discharged from the hospital 2 days ago after he was admitted for missing dialysis, pre-syncope and behavioral disturbances  Prior to Admission Medications   Prescriptions Last Dose Informant Patient Reported? Taking? Blood Glucose Monitoring Suppl (True Metrix Meter) w/Device KIT   No No   Sig: Use to test blood sugars 3 times daily   Blood Pressure Monitoring (BLOOD PRESSURE CUFF) MISC   No No   Sig: Use to check blood pressure before taking blood pressure medication and 1 hour after and follow instructions provided in discharge instructions based on the readings     Cholecalciferol (Vitamin D3) 1 25 MG (56222 UT) CAPS   No No   Sig: TAKE 1 CAPSULE BY MOUTH ONE TIME PER WEEK   Cinacalcet HCl (SENSIPAR PO)   Yes No   Sig: Take 60 mg by mouth 3 (three) times a week   Patient not taking: Reported on 2022   Efavirenz (SUSTIVA PO)   Yes No   Sig: efavirenz   Patient not taking: Reported on 2022   Insulin Pen Needle (BD Pen Needle Adina U/F) 32G X 4 MM MISC   No No   Sig: Use 4 times daily   Insulin Syringe-Needle U-100 (B-D INS SYRINGE 0 5CC/30GX1/2") 30G X 1/2" 0 5 ML MISC   No No   Sig: Inject once daily   Lancets Ultra Thin 30G MISC   No No   Sig: Use 3 times a day   Methoxy PEG-Epoetin Beta 30 MCG/0 3ML SOSY   Yes No   Si mcg   Patient not taking: Reported on 3935   ONETOUCH DELICA LANCETS 97P MISC   No No   Sig: by Does not apply route 3 (three) times a day   Semaglutide,0 25 or 0 5MG/DOS, (Ozempic, 0 25 or 0 5 MG/DOSE,) 2 MG/1 5ML SOPN   No No   Sig: Inject 0 25 mg once a week   Patient not taking: Reported on 2022   aspirin (ECOTRIN LOW STRENGTH) 81 mg EC tablet   Yes No   Sig: Take 81 mg by mouth daily Resume on      atorvastatin (LIPITOR) 40 mg tablet   No No   Sig: TAKE 1 TABLET BY MOUTH EVERY DAY WITH DINNER   benzonatate (TESSALON PERLES) 100 mg capsule   No No   Sig: Take 1 capsule (100 mg total) by mouth 3 (three) times a day as needed for cough   Patient not taking: Reported on 2022   calcium acetate (CALPHRON) 667 mg   Yes No   Sig: 3 tablets 3x per day   carvedilol (COREG) 12 5 mg tablet   No No   Sig: Take 1 tablet (12 5 mg total) by mouth 2 (two) times a day with meals   divalproex sodium (DEPAKOTE SPRINKLE) 125 MG capsule   No No   Sig: Take 1 capsule (125 mg total) by mouth every 12 (twelve) hours   glucose blood (True Metrix Blood Glucose Test) test strip   No No   Sig: Use 1 each 3 (three) times a day Use as instructed   insulin glargine (Lantus) 100 units/mL subcutaneous injection   No No   Sig: Inject 14 Units under the skin daily   insulin lispro (HumaLOG KwikPen) 100 units/mL injection pen   No No   Sig: INJECT 4 UNITS 3 TIMES A DAY WITH MEALS PLUS SCALE (max daily dose 42 units)   prasugrel (EFFIENT) tablet   Yes No   Sig: Take 1 tablet by mouth daily   torsemide (DEMADEX) 10 mg tablet   No No   Sig: Take 5 tablets (50 mg total) by mouth 4 (four) times a week Take on non-dialysis days Sunday, Monday, Wednesday, Friday Do not start before January 8, 2023  Facility-Administered Medications: None       Past Medical History:   Diagnosis Date   • Cerebrovascular accident (CVA) due to thrombosis of left middle cerebral artery ([facility] ) 7/29/2018   • Chronic kidney disease    • Diabetes mellitus ([facility] )    • GERD (gastroesophageal reflux disease)    • Hypercholesteremia    • Hyperlipidemia    • Hypertension    • 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   • Neuropathy    • Obesity    • Osteomyelitis ([facility] )     last assessed 11/4/16   • PVC's (premature ventricular contractions)     sees cardiology Dr Vikash camargo   • Stroke [facility])     last weeof July 2018 [address] [facility]   • TIA (transient ischemic attack) 10/28/2018       Past Surgical History:   Procedure Laterality Date   • ABDOMINAL SURGERY     • CARDIAC CATHETERIZATION N/A 5/2/2022    Procedure: Cardiac Coronary Angiogram;  Surgeon: Jennifer Edward MD;  [facility] CARD[facility]AC [facility]; Service: Cardiology   • CARDIAC CATHETERIZATION N/A 5/2/2022    Procedure: Cardiac pci;  Surgeon: Jennifer Edward MD;  [facility] CARD[facility]AC [facility]ATH [facility]; Service: Cardiology   • CHOLECYSTECTOMY      Percutaneous   • COLONOSCOPY     • CYSTOSCOPY     • OTHER SURGICAL HISTORY      "stimulator to control bowel movements"   • MN ESOPHAGOGASTRODUODENOSCOPY TRANSORAL DIAGNOSTIC N/A 9/27/2016    Procedure: ESOPHAGOGASTRODUODENOSCOPY (EGD); Surgeon: Alma Vázquez MD;  Location: [facility] GI LAB;   Service: Gastroenterology   • MN LAPAROSCOPY SURG CHOLECYSTECTOMY N/A 2/29/2016    Procedure: LAPAROSCOPIC CHOLECYSTECTOMY ;  Surgeon: Carmen Montero DO;  Location: [facility] Main OR;  Service: General   • ROTATOR CUFF REPAIR Right    • TOE AMPUTATION Right 10/28/2016    Procedure: 3RD TOE AMPUTATION ;  Surgeon: Roberto Bay Trixie Vallecillo DPM;  Location: [facility] Main OR;  Service:        Family History   Problem Relation Age of Onset   • Leukemia Mother    • Liver disease Mother    • Lung cancer Mother         heavy smoker - 3 ppd   • Heart disease Father    • Liver disease Father    • Multiple myeloma Sister    • Breast cancer Sister    • Urolithiasis Family    • Alcohol abuse Neg Hx    • Depression Neg Hx    • Drug abuse Neg Hx    • Substance Abuse Neg Hx    • Mental illness Neg Hx      I have reviewed and agree with the history as documented  E-Cigarette/Vaping   • E-Cigarette Use Never User      E-Cigarette/Vaping Substances   • Nicotine No    • THC No    • CBD No    • Flavoring No    • Other No    • Unknown No      Social History     Tobacco Use   • Smoking status: Never   • Smokeless tobacco: Never   Vaping Use   • Vaping Use: Never used   Substance Use Topics   • Alcohol use: Not Currently   • Drug use: No       Review of Systems   Constitutional: Negative for chills, diaphoresis, fatigue and fever  HENT: Negative for congestion, ear pain, rhinorrhea and sore throat  Eyes: Negative for photophobia and visual disturbance  Respiratory: Positive for shortness of breath  Negative for cough, chest tightness and wheezing  Cardiovascular: Positive for chest pain  Negative for palpitations and leg swelling  Gastrointestinal: Negative for abdominal pain, blood in stool, constipation, diarrhea, nausea and vomiting  Genitourinary: Negative for dysuria, flank pain, frequency, hematuria and urgency  Musculoskeletal: Negative for arthralgias, gait problem and myalgias  Skin: Negative for rash  Neurological: Positive for light-headedness  Negative for dizziness, syncope, speech difficulty, weakness, numbness and headaches  All other systems reviewed and are negative  Physical Exam  Physical Exam  Vitals and nursing note reviewed  Constitutional:       General: He is awake  He is not in acute distress  Appearance: Normal appearance  He is well-developed  He is not ill-appearing or diaphoretic  HENT:      Head: Normocephalic and atraumatic  Right Ear: External ear normal       Left Ear: External ear normal       Nose: Nose normal       Mouth/Throat:      Lips: Pink  Mouth: Mucous membranes are moist    Eyes:      General: Lids are normal  No scleral icterus  Conjunctiva/sclera: Conjunctivae normal       Pupils: Pupils are equal, round, and reactive to light  Cardiovascular:      Rate and Rhythm: Normal rate and regular rhythm  Pulses: Normal pulses  Radial pulses are 2+ on the right side and 2+ on the left side  Heart sounds: Normal heart sounds, S1 normal and S2 normal    Pulmonary:      Effort: Pulmonary effort is normal  No accessory muscle usage  Breath sounds: Normal breath sounds  No stridor  No decreased breath sounds, wheezing, rhonchi or rales  Abdominal:      General: Abdomen is flat  Bowel sounds are normal  There is no distension  Palpations: Abdomen is soft  Tenderness: There is no abdominal tenderness  There is no right CVA tenderness, left CVA tenderness, guarding or rebound  Musculoskeletal:      Cervical back: Full passive range of motion without pain, normal range of motion and neck supple  No signs of trauma  No pain with movement  Normal range of motion  Right lower leg: No edema  Left lower leg: No edema  Comments: Left brachiocephalic fistula, (+) bruit/thrill  No overlying erythema/warmth  Lymphadenopathy:      Cervical: No cervical adenopathy  Skin:     General: Skin is warm and dry  Capillary Refill: Capillary refill takes less than 2 seconds  Coloration: Skin is not cyanotic, jaundiced or pale  Neurological:      Mental Status: He is alert and oriented to person, place, and time  GCS: GCS eye subscore is 4  GCS verbal subscore is 5  GCS motor subscore is 6        Cranial Nerves: No dysarthria or facial asymmetry  Gait: Gait normal    Psychiatric:         Attention and Perception: Attention normal          Mood and Affect: Mood normal          Speech: Speech normal          Behavior: Behavior normal  Behavior is cooperative           Vital Signs  ED Triage Vitals   Temperature Pulse Respirations Blood Pressure SpO2   01/09/23 0920 01/09/23 0920 01/09/23 0920 01/09/23 0920 01/09/23 0920   97 6 °F (36 4 °C) 80 18 (!) 186/87 95 %      Temp Source Heart Rate Source Patient Position - Orthostatic VS BP Location FiO2 (%)   01/09/23 0920 01/09/23 1237 01/09/23 0920 01/09/23 0920 --   Oral Monitor Lying Left arm       Pain Score       01/09/23 0920       7           Vitals:    01/09/23 0920 01/09/23 1110 01/09/23 1237 01/09/23 1446   BP: (!) 186/87 (!) 169/102 155/67 (!) 185/79   Pulse: 80  72 78   Patient Position - Orthostatic VS: Lying  Sitting Sitting         Visual Acuity      ED Medications  Medications   iohexol (OMNIPAQUE) 350 MG/ML injection (SINGLE-DOSE) 85 mL (85 mL Intravenous Given 1/9/23 1434)       Diagnostic Studies  Results Reviewed     Procedure Component Value Units Date/Time    HS Troponin I 2hr [[id number]]  (Abnormal) Collected: 01/09/23 1449    Lab Status: Final result Specimen: Blood from Arm, Right Updated: 01/09/23 1523     hs TnI 2hr 50 ng/L      Delta 2hr hsTnI -6 ng/L     HS Troponin I 4hr [[id number]]  (Abnormal) Collected: 01/09/23 1233    Lab Status: Final result Specimen: Blood from Arm, Right Updated: 01/09/23 1314     hs TnI 4hr 62 ng/L      Delta 4hr hsTnI 6 ng/L     Comprehensive metabolic panel [[id number]]  (Abnormal) Collected: 01/09/23 1014    Lab Status: Final result Specimen: Blood from Arm, Right Updated: 01/09/23 1051     Sodium 136 mmol/L      Potassium 5 1 mmol/L      Chloride 101 mmol/L      CO2 27 mmol/L      ANION GAP 8 mmol/L      BUN 45 mg/dL      Creatinine 7 43 mg/dL      Glucose 109 mg/dL      Calcium 9 5 mg/dL      AST 10 U/L      ALT 11 U/L      Alkaline Phosphatase 74 U/L      Total Protein 7 2 g/dL      Albumin 4 1 g/dL      Total Bilirubin 0 61 mg/dL      eGFR 7 ml/min/1 73sq m     Narrative:      National Kidney Disease Foundation guidelines for Chronic Kidney Disease (CKD):   •  Stage 1 with normal or high GFR (GFR > 90 mL/min/1 73 square meters)  •  Stage 2 Mild CKD (GFR = 60-89 mL/min/1 73 square meters)  •  Stage 3A Moderate CKD (GFR = 45-59 mL/min/1 73 square meters)  •  Stage 3B Moderate CKD (GFR = 30-44 mL/min/1 73 square meters)  •  Stage 4 Severe CKD (GFR = 15-29 mL/min/1 73 square meters)  •  Stage 5 End Stage CKD (GFR <15 mL/min/1 73 square meters)  Note: GFR calculation is accurate only with a steady state creatinine    Phosphorus [[id number]]  (Abnormal) Collected: 01/09/23 1014    Lab Status: Final result Specimen: Blood from Arm, Right Updated: 01/09/23 1051     Phosphorus 4 7 mg/dL     Magnesium [[id number]]  (Normal) Collected: 01/09/23 1014    Lab Status: Final result Specimen: Blood from Arm, Right Updated: 01/09/23 1051     Magnesium 2 1 mg/dL     HS Troponin 0hr (reflex protocol) [[id number]]  (Abnormal) Collected: 01/09/23 1014    Lab Status: Final result Specimen: Blood from Arm, Right Updated: 01/09/23 1049     hs TnI 0hr 56 ng/L     B-Type Natriuretic Peptide(BNP) [facility], [place], [place] [facility] Only [[id number]]  (Abnormal) Collected: 01/09/23 1014    Lab Status: Final result Specimen: Blood from Arm, Right Updated: 01/09/23 1049      pg/mL     CBC and differential [[id number]]  (Abnormal) Collected: 01/09/23 1014    Lab Status: Final result Specimen: Blood from Arm, Right Updated: 01/09/23 1022     WBC 6 08 Thousand/uL      RBC 3 66 Million/uL      Hemoglobin 11 2 g/dL      Hematocrit 35 4 %      MCV 97 fL      MCH 30 6 pg      MCHC 31 6 g/dL      RDW 14 6 %      MPV 10 1 fL      Platelets 018 Thousands/uL      nRBC 0 /100 WBCs      Neutrophils Relative 66 %      Immat GRANS % 1 %      Lymphocytes Relative 20 %      Monocytes Relative 10 % Eosinophils Relative 2 %      Basophils Relative 1 %      Neutrophils Absolute 4 07 Thousands/µL      Immature Grans Absolute 0 04 Thousand/uL      Lymphocytes Absolute 1 24 Thousands/µL      Monocytes Absolute 0 60 Thousand/µL      Eosinophils Absolute 0 09 Thousand/µL      Basophils Absolute 0 04 Thousands/µL     Fingerstick Glucose (POCT) [[id number]]  (Normal) Collected: 01/09/23 1001    Lab Status: Final result Updated: 01/09/23 1016     POC Glucose 121 mg/dl                  CTA ED chest PE study   Final Result by Mihir Bridges MD (01/09 1521)      No pulmonary embolism  Scarring versus subsegmental atelectasis in the right costophrenic angle  Workstation performed: [id number]         XR chest 1 view portable   ED Interpretation by Zari Brunner PA-C (01/09 1033)   Moderate pulmonary congestion  Final Result by Jovi Roblero MD (01/09 1111)      Mild cardiomegaly  Mild/moderate vascular congestion      Findings concur with the preliminary report by the referring clinician already in PACS and/or our electronic record EPIC  Workstation performed: [id number]                    Procedures  ECG 12 Lead Documentation Only    Date/Time: 1/9/2023 12:12 PM  Performed by: Zari Brunner PA-C  Authorized by: Zari Brunner PA-C     Indications / Diagnosis:  Chest pain  ECG reviewed by me, the ED Provider: yes    Patient location:  ED  Previous ECG:     Previous ECG:  Compared to current    Comparison ECG info:  1/6/23    Similarity:  No change    Comparison to cardiac monitor: Yes    Interpretation:     Interpretation: abnormal    Rate:     ECG rate:  77    ECG rate assessment: normal    Rhythm:     Rhythm: sinus rhythm    Ectopy:     Ectopy: none    QRS:     QRS intervals: Wide  Conduction:     Conduction: abnormal      Abnormal conduction: complete RBBB    Comments:      No STEMI  QT/QTc 438/495  RBBB is chronic                ED Course  ED Course as of 01/10/23 1541   Mon Jan 09, 2023   1057 hs TnI 0hr(!): 56   1549 hs TnI 4hr(!): 62  2 hour   1549 hs TnI 2hr(!): 50  4 hour   1550 2 and 4 hour trop reversed             HEART Risk Score    Flowsheet Row Most Recent Value   Heart Score Risk Calculator    History 1 Filed at: 01/09/2023 1554   ECG 1 Filed at: 01/09/2023 1554   Age 1 Filed at: 01/09/2023 1554   Risk Factors 2 Filed at: 01/09/2023 1554   Troponin 2 Filed at: 01/09/2023 1554   HEART Score 7 Filed at: 01/09/2023 1554                PERC Rule for PE    Flowsheet Row Most Recent Value   PERC Rule for PE    Age >=50 1 Filed at: 01/10/2023 1454   HR >=100 0 Filed at: 01/10/2023 1454   O2 Sat on room air < 95% 0 Filed at: 01/10/2023 1454   History of PE or DVT 0 Filed at: 01/10/2023 1454   Recent trauma or surgery 0 Filed at: 01/10/2023 1454   Hemoptysis 0 Filed at: 01/10/2023 1454   Exogenous estrogen 0 Filed at: 01/10/2023 1454   Unilateral leg swelling 0 Filed at: 01/10/2023 1454   PERC Rule for PE Results 1 Filed at: 01/10/2023 1454                  Wells' Criteria for PE    Flowsheet Row Most Recent Value   Wells' Criteria for PE    Clinical signs and symptoms of DVT 0 Filed at: 01/10/2023 1455   PE is primary diagnosis or equally likely 3 Filed at: 01/10/2023 1455   HR >100 0 Filed at: 01/10/2023 1455   Immobilization at least 3 days or Surgery in the previous 4 weeks 1 5  [recent hospitalization] Filed at: 01/10/2023 1455   Previous, objectively diagnosed PE or DVT 0 Filed at: 01/10/2023 1455   Hemoptysis 0 Filed at: 01/10/2023 1455   Malignancy with treatment within 6 months or palliative 0 Filed at: 01/10/2023 1455   Wells' Criteria Total 4 5 Filed at: 01/10/2023 1455                Medical Decision Making  Patient is a 57 y/o male with a PMHx of CVA, ESRD (on dialysis Tues/Thurs/Saturday), DM 2, GERD, HLD, HTN and neuropathy, presenting to the ED for evaluation of chest pain x1 hour     EKG sinus rhythm with a RBBB and no acute ischemia, similar to prior EKGs  Troponins trended x3 with no significant changes  Heart score 7; however, this is patient's baseline  CT shows no evidence of PE or other acute abnormalities  Patient frequently requesting something to eat while in the ED and is in no acute distress  Patient removed the telemetry leads on his own and is sitting up in a chair playing cards  He declines any repeat EKG's  He is requesting to leave and denies any pain or symptoms at this time  He states that he was told to come in by his family and does not want to be here  Patient instructed to follow-up with his cardiologist and PCP or return for new/worsening symptoms  The management plan was discussed in detail with the patient at bedside and all questions were answered  Strict ED return instructions were discussed at bedside  Prior to discharge, both verbal and written instructions were provided  We discussed the signs and symptoms that should prompt the patient to return to the ED  All questions were answered and the patient was comfortable with the plan of care and discharged home  The patient agrees to return to the Emergency Department for concerns and/or progression of illness  Chest pain: self-limited or minor problem     Details: Troponins trended: 56 > 62 > 50 (please note, the 2 and 4 hour troponins are reversed)  Troponin from 5 days prior was 103 so level has decreased and is not trending upwards  Patient's chest pain resolved after about 20 minutes in the ED with no intervention  EKG normal sinus rhythm with a RBBB, similar to prior EKG's; no evidence of acute ischemia  ESRD (end stage renal disease) on dialysis [facility]): acute illness or injury     Details: Patient does not appear fluid-overloaded, last dialysis 2 days ago  Hyperphosphatemia: chronic illness or injury  Amount and/or Complexity of Data Reviewed  Labs: ordered  Decision-making details documented in ED Course    Radiology: ordered and independent interpretation performed  Decision-making details documented in ED Course  ECG/medicine tests: ordered  Decision-making details documented in ED Course  Risk  Prescription drug management  Disposition  Final diagnoses:   Chest pain   Hyperphosphatemia   ESRD (end stage renal disease) on dialysis [facility])     Time reflects when diagnosis was documented in both MDM as applicable and the Disposition within this note     Time User Action Codes Description Comment    1/9/2023  3:55 PM Kevin Savory Add [R07 9] Chest pain     1/9/2023  3:55 PM Yoavvincent Savory Add [E83 39] Hyperphosphatemia     1/9/2023  3:55 PM Krishna Christian Talleys Add [N18 6,  Z99 2] ESRD (end stage renal disease) on dialysis [facility])       ED Disposition     ED Disposition   Discharge    Condition   Stable    Date/Time   Mon Jan 9, 2023  3:55 PM    Ryder discharge to home/self care  Follow-up Information     Follow up With Specialties Details Why Contact Info Additional [address],  Internal Medicine Schedule an appointment as soon as possible for a visit   [phone]   [place] [id number]  [phone]       [place] [address] Emergency Department Emergency Medicine  If symptoms worsen [address] [facility] [id number] [place] [address] Emergency Department, [address], [place], [place], [address]          Discharge Medication List as of 1/9/2023  3:57 PM      CONTINUE these medications which have NOT CHANGED    Details   aspirin (ECOTRIN LOW STRENGTH) 81 mg EC tablet Take 81 mg by mouth daily Resume on 8/14  , Historical Med      atorvastatin (LIPITOR) 40 mg tablet TAKE 1 TABLET BY MOUTH EVERY DAY WITH DINNER, Normal      benzonatate (TESSALON PERLES) 100 mg capsule Take 1 capsule (100 mg total) by mouth 3 (three) times a day as needed for cough, Starting Fri 10/21/2022, Normal      Blood Glucose Monitoring Suppl (True Metrix Meter) w/Device KIT Use to test blood sugars 3 times daily, Normal      Blood Pressure Monitoring (BLOOD PRESSURE CUFF) MISC Use to check blood pressure before taking blood pressure medication and 1 hour after and follow instructions provided in discharge instructions based on the readings  , Print      calcium acetate (CALPHRON) 667 mg 3 tablets 3x per day, Historical Med      carvedilol (COREG) 12 5 mg tablet Take 1 tablet (12 5 mg total) by mouth 2 (two) times a day with meals, Starting Thu 10/13/2022, Until Thu 1/5/2023, Normal      Cholecalciferol (Vitamin D3) 1 25 MG (84631 UT) CAPS TAKE 1 CAPSULE BY MOUTH ONE TIME PER WEEK, Normal      Cinacalcet HCl (SENSIPAR PO) Take 60 mg by mouth 3 (three) times a week, Starting Mon 6/20/2022, Until Mon 6/19/2023, Historical Med      divalproex sodium (DEPAKOTE SPRINKLE) 125 MG capsule Take 1 capsule (125 mg total) by mouth every 12 (twelve) hours, Starting Sat 1/7/2023, Until Mon 2/6/2023, Normal      Efavirenz (SUSTIVA PO) efavirenz, Historical Med      glucose blood (True Metrix Blood Glucose Test) test strip Use 1 each 3 (three) times a day Use as instructed, Starting Fri 7/1/2022, Normal      insulin glargine (Lantus) 100 units/mL subcutaneous injection Inject 14 Units under the skin daily, Starting Wed 12/14/2022, Normal      insulin lispro (HumaLOG KwikPen) 100 units/mL injection pen INJECT 4 UNITS 3 TIMES A DAY WITH MEALS PLUS SCALE (max daily dose 42 units), Normal      Insulin Pen Needle (BD Pen Needle Adina U/F) 32G X 4 MM MISC Use 4 times daily, Normal      Insulin Syringe-Needle U-100 (B-D INS SYRINGE 0 5CC/30GX1/2") 30G X 1/2" 0 5 ML MISC Inject once daily, Normal      !!  Lancets Ultra Thin 30G MISC Use 3 times a day, Normal      Methoxy PEG-Epoetin Beta 30 MCG/0 3ML SOSY 30 mcg, Starting Mon 5/16/2022, Until Mon 5/15/2023 at 2359, Historical Med      !! ONETOUCH DELICA LANCETS 58W MISC by Does not apply route 3 (three) times a day, Starting Tue 11/27/2018, Normal      prasugrel (EFFIENT) tablet Take 1 tablet by mouth daily, Starting Tue 8/2/2022, Historical Med      Semaglutide,0 25 or 0 5MG/DOS, (Ozempic, 0 25 or 0 5 MG/DOSE,) 2 MG/1 5ML SOPN Inject 0 25 mg once a week, Normal      torsemide (DEMADEX) 10 mg tablet Take 5 tablets (50 mg total) by mouth 4 (four) times a week Take on non-dialysis days Sunday, Monday, Wednesday, Friday Do not start before January 8, 2023 , Starting Sun 1/8/2023, No Print       !! - Potential duplicate medications found  Please discuss with provider  No discharge procedures on file      PDMP Review       Value Time User    PDMP Reviewed  Yes 11/12/2022  6:19 AM Rosy Martinez MD          ED Provider  Electronically Signed by           Mari Kang PA-C  01/10/23 9747

## 2023-01-09 NOTE — ED NOTES
Continues with yelling and verbally abusive to his wife, she left for home     Herve Arteaga RN  01/09/23 [id number]

## 2023-01-09 NOTE — ED NOTES
# 3 trop drawn and sent  Ct completed  Wife returned  Pt began cursing at her again  Threatening to " take care of the doctor  Because he never came to see me"  Assured him the doctor was in and is caring for him       Garfield Avila RN  01/09/23 [id number]

## 2023-01-09 NOTE — ED NOTES
Sitting on a chair near the door off the monitor refusing to get back to bed     Jacquelyn Collins RN  01/09/23 5750

## 2023-01-09 NOTE — ED NOTES
# 2 trop drawn and sent [facility] cp, unhappy he's being admitted, wants to go home  Wife remains at bedside  Pt has moments where is verbally aggressive towards her, saying : f ck you to her and pointing his middle finger at her stating " I hope you die"  Is cooperative with nursing        Norman Ugarte, THIERNO  01/09/23 4785

## 2023-01-09 NOTE — ED NOTES
Pt back to bed, back on monitor   Asking repeatedly  " how much longer"     Tanisha Jacob, RN  01/09/23 7806

## 2023-01-09 NOTE — ED NOTES
Pt came out of room demanding food tray immediately  Staff reminded pt that food tray has been ordered but we have no control on the time that it arrives  Pt also stating a doctor has not seen pt, staff reminded pt staff has been in and out of his room all morning and early afternoon  Provider made aware and resident at pt's bedside        Desirae Fang RN  01/09/23 [id number]

## 2023-01-12 ENCOUNTER — TRANSITIONAL CARE MANAGEMENT (OUTPATIENT)
Dept: INTERNAL MEDICINE CLINIC | Facility: CLINIC | Age: 63
End: 2023-01-12

## 2023-01-13 ENCOUNTER — OFFICE VISIT (OUTPATIENT)
Dept: INTERNAL MEDICINE CLINIC | Facility: CLINIC | Age: 63
End: 2023-01-13

## 2023-01-13 VITALS
HEIGHT: 69 IN | OXYGEN SATURATION: 98 % | SYSTOLIC BLOOD PRESSURE: 110 MMHG | DIASTOLIC BLOOD PRESSURE: 68 MMHG | RESPIRATION RATE: 16 BRPM | WEIGHT: 228 LBS | BODY MASS INDEX: 33.77 KG/M2 | HEART RATE: 71 BPM

## 2023-01-13 DIAGNOSIS — N18.6 ESRD ON DIALYSIS (HCC): ICD-10-CM

## 2023-01-13 DIAGNOSIS — E11.22 TYPE 2 DIABETES MELLITUS WITH CHRONIC KIDNEY DISEASE ON CHRONIC DIALYSIS, WITH LONG-TERM CURRENT USE OF INSULIN (HCC): ICD-10-CM

## 2023-01-13 DIAGNOSIS — N18.6 TYPE 2 DIABETES MELLITUS WITH CHRONIC KIDNEY DISEASE ON CHRONIC DIALYSIS, WITH LONG-TERM CURRENT USE OF INSULIN (HCC): ICD-10-CM

## 2023-01-13 DIAGNOSIS — F69 BEHAVIOR CONCERN IN ADULT: ICD-10-CM

## 2023-01-13 DIAGNOSIS — Z79.899 ON VALPROATE THERAPY: ICD-10-CM

## 2023-01-13 DIAGNOSIS — Z79.4 TYPE 2 DIABETES MELLITUS WITH CHRONIC KIDNEY DISEASE ON CHRONIC DIALYSIS, WITH LONG-TERM CURRENT USE OF INSULIN (HCC): ICD-10-CM

## 2023-01-13 DIAGNOSIS — E78.2 MIXED HYPERLIPIDEMIA: ICD-10-CM

## 2023-01-13 DIAGNOSIS — Z99.2 TYPE 2 DIABETES MELLITUS WITH CHRONIC KIDNEY DISEASE ON CHRONIC DIALYSIS, WITH LONG-TERM CURRENT USE OF INSULIN (HCC): ICD-10-CM

## 2023-01-13 DIAGNOSIS — Z09 HOSPITAL DISCHARGE FOLLOW-UP: Primary | ICD-10-CM

## 2023-01-13 DIAGNOSIS — F41.8 ANXIETY ASSOCIATED WITH DEPRESSION: ICD-10-CM

## 2023-01-13 DIAGNOSIS — I25.10 CORONARY ARTERY DISEASE INVOLVING NATIVE CORONARY ARTERY OF NATIVE HEART WITHOUT ANGINA PECTORIS: ICD-10-CM

## 2023-01-13 DIAGNOSIS — Z99.2 ESRD ON DIALYSIS (HCC): ICD-10-CM

## 2023-01-13 PROBLEM — N18.9 ACUTE KIDNEY INJURY SUPERIMPOSED ON CHRONIC KIDNEY DISEASE (HCC): Status: RESOLVED | Noted: 2020-10-05 | Resolved: 2023-01-13

## 2023-01-13 PROBLEM — D61.818 PANCYTOPENIA (HCC): Status: RESOLVED | Noted: 2020-12-31 | Resolved: 2023-01-13

## 2023-01-13 PROBLEM — N17.9 ACUTE KIDNEY INJURY SUPERIMPOSED ON CHRONIC KIDNEY DISEASE (HCC): Status: RESOLVED | Noted: 2020-10-05 | Resolved: 2023-01-13

## 2023-01-13 PROBLEM — R11.2 NAUSEA & VOMITING: Status: RESOLVED | Noted: 2022-07-14 | Resolved: 2023-01-13

## 2023-01-13 RX ORDER — INSULIN GLARGINE 100 [IU]/ML
14 INJECTION, SOLUTION SUBCUTANEOUS DAILY
Qty: 30 ML | Refills: 0 | Status: CANCELLED | OUTPATIENT
Start: 2023-01-13

## 2023-01-13 RX ORDER — LANCETS 30 GAUGE
EACH MISCELLANEOUS
Qty: 300 EACH | Refills: 0 | Status: CANCELLED | OUTPATIENT
Start: 2023-01-13

## 2023-01-13 RX ORDER — PEN NEEDLE, DIABETIC 32GX 5/32"
NEEDLE, DISPOSABLE MISCELLANEOUS
Qty: 400 EACH | Refills: 1 | Status: CANCELLED | OUTPATIENT
Start: 2023-01-13

## 2023-01-13 RX ORDER — TORSEMIDE 10 MG/1
50 TABLET ORAL
Refills: 0 | Status: CANCELLED | OUTPATIENT
Start: 2023-01-14

## 2023-01-13 RX ORDER — DIVALPROEX SODIUM 125 MG/1
125 CAPSULE, COATED PELLETS ORAL EVERY 12 HOURS SCHEDULED
Qty: 60 CAPSULE | Refills: 3 | Status: SHIPPED | OUTPATIENT
Start: 2023-01-13

## 2023-01-13 RX ORDER — INSULIN LISPRO 100 [IU]/ML
INJECTION, SOLUTION INTRAVENOUS; SUBCUTANEOUS
Qty: 30 ML | Refills: 1 | Status: CANCELLED | OUTPATIENT
Start: 2023-01-13

## 2023-01-13 RX ORDER — ATORVASTATIN CALCIUM 40 MG/1
40 TABLET, FILM COATED ORAL
Qty: 90 TABLET | Refills: 1 | Status: CANCELLED | OUTPATIENT
Start: 2023-01-13

## 2023-01-13 RX ORDER — CARVEDILOL 12.5 MG/1
12.5 TABLET ORAL 2 TIMES DAILY WITH MEALS
Qty: 180 TABLET | Refills: 3 | Status: CANCELLED | OUTPATIENT
Start: 2023-01-13 | End: 2023-02-12

## 2023-01-13 RX ORDER — TORSEMIDE 100 MG/1
50 TABLET ORAL DAILY
Qty: 45 TABLET | Refills: 1 | Status: SHIPPED | OUTPATIENT
Start: 2023-01-13

## 2023-01-13 NOTE — PROGRESS NOTES
Assessment & Plan     1  Hospital discharge follow-up    2  Behavior concern in adult  Comments:  c/w divalproex for now and check level in 2-3 weeks  f/u in office in 6-8 weeks  Orders:  -     divalproex sodium (DEPAKOTE SPRINKLE) 125 MG capsule; Take 1 capsule (125 mg total) by mouth every 12 (twelve) hours  -     Ammonia; Future; Expected date: 02/01/2023  -     Valproic acid level, total; Future; Expected date: 02/01/2023    3  Anxiety associated with depression  Comments:  c/w plan as above, patient agreeable to c/w divalproex  Orders:  -     divalproex sodium (DEPAKOTE SPRINKLE) 125 MG capsule; Take 1 capsule (125 mg total) by mouth every 12 (twelve) hours    4  On valproate therapy  -     Ammonia; Future; Expected date: 02/01/2023  -     Valproic acid level, total; Future; Expected date: 02/01/2023    5  ESRD on dialysis [facility]  Comments:  goes Tues/Thur/Sat for HD  Orders:  -     torsemide (DEMADEX) 100 mg tablet; Take 0 5 tablets (50 mg total) by mouth daily As directed by your nephrologist    6  Type 2 diabetes mellitus with chronic kidney disease on chronic dialysis, with long-term current use of insulin ([facility])  Comments:  blood sugars/A1C doing well, c/w insulin and care per endocrine    7  Coronary artery disease involving native coronary artery of native heart without angina pectoris  Comments:  s/p stenting in [place] and stable, c/w statin/BB/asa and f/u cardiology    8  Mixed hyperlipidemia  Comments:  taking statin and no SE, c/w rx      BMI Counseling: Body mass index is 33 67 kg/m²  The BMI is above normal  Nutrition recommendations include moderation in carbohydrate intake  Rationale for BMI follow-up plan is due to patient being overweight or obese  Subjective     Transitional Care Management Review:   Keyla Bermudez is a 58 y o  male here for TCM follow up       During the TCM phone call patient stated:  TCM Call     Date and time call was made  1/10/2023  8:16 AM    Hospital care reviewed Records reviewed    Patient was hospitialized at  [address]    Date of Admission  01/05/23    Date of discharge  01/07/23    Diagnosis  Behavior concern in adult    Disposition  Home    Were the patients medications reviewed and updated  No    Current Symptoms  None      TCM Call     Post hospital issues  None    Should patient be enrolled in anticoag monitoring? No    Scheduled for follow up? Yes    Patient refusal reason  DID NOT RETURN ANY OF MY PHONE CALLS TO SCHEDULE HIS APPT    Did you obtain your prescribed medications  Yes    Do you need help managing your prescriptions or medications  No    Is transportation to your appointment needed  No    I have advised the patient to call PCP with any new or worsening symptoms  ALICIA EGAN A    Living Arrangements  Spouse or Significiant other    Support System  None    The type of support provided  Emotional; Financial; Physical    Are you recieving any outpatient services  No    Are you recieving home care services  No    Are you using any community resources  No    Current waiver services  No    Have you fallen in the last 12 months  No    Interperter language line needed  No    Counseling  Family    Comments  LEFT SEVERAL MESSAGES TO CALL BACK TO SCHEDULE HIS TCM         HPI     Here for hospital discharge follow up, Marcella Sequeira is here with his daughter during today's appt  He was in the hospital for mood problems and missing dialysis treatments  He was discharged on divalproex and sertraline stopped  He is feeling overall better  He admits to worry and feel anxious  He is on waiting list to see psychiatry  He has ESRD on HD Tues/Thur/Sat  His blood sugars are doing better per daughter  He would like to obtain a kidney transplant and is waiting to be listed as he has to hit his dry weight on dialysis before he can become eligible  ROS otherwise negative, no other complaints  Review of Systems   Constitutional: Negative for fever     HENT: Negative for congestion  Eyes: Negative for visual disturbance  Respiratory: Negative for shortness of breath  Cardiovascular: Negative for chest pain  Gastrointestinal: Negative for abdominal pain  Endocrine: Negative for polyuria  Genitourinary: Negative for difficulty urinating  Musculoskeletal: Negative for gait problem  Skin: Positive for rash (dry skin on legs)  Allergic/Immunologic: Negative for immunocompromised state  Neurological: Negative for weakness  Psychiatric/Behavioral: The patient is nervous/anxious  Objective     /68 (BP Location: Right arm, Patient Position: Sitting)   Pulse 71   Resp 16   Ht 5' 9" (1 753 m)   Wt 103 kg (228 lb)   SpO2 98%   BMI 33 67 kg/m²      Physical Exam  Vitals reviewed  Constitutional:       Appearance: He is well-developed  He is obese  HENT:      Head: Normocephalic and atraumatic  Right Ear: Tympanic membrane normal       Left Ear: Tympanic membrane normal       Nose: Rhinorrhea present  Eyes:      Conjunctiva/sclera: Conjunctivae normal    Cardiovascular:      Rate and Rhythm: Normal rate and regular rhythm  Heart sounds:     No gallop  Pulmonary:      Effort: Pulmonary effort is normal  No respiratory distress  Breath sounds: Normal breath sounds  Abdominal:      Palpations: Abdomen is soft  Tenderness: There is no abdominal tenderness  Musculoskeletal:      Cervical back: Neck supple  Right lower leg: No edema  Left lower leg: No edema  Skin:     General: Skin is dry  Neurological:      Mental Status: He is alert  Mental status is at baseline  Psychiatric:         Mood and Affect: Mood is anxious and depressed  Behavior: Behavior normal          Thought Content: Thought content does not include suicidal ideation         Medications have been reviewed by provider in current encounter    Maninder Duff DO

## 2023-01-14 ENCOUNTER — HOSPITAL ENCOUNTER (EMERGENCY)
Facility: HOSPITAL | Age: 63
Discharge: HOME | End: 2023-01-14
Attending: EMERGENCY MEDICINE | Admitting: EMERGENCY MEDICINE

## 2023-01-14 VITALS
OXYGEN SATURATION: 98 % | RESPIRATION RATE: 18 BRPM | SYSTOLIC BLOOD PRESSURE: 162 MMHG | TEMPERATURE: 98.6 F | HEART RATE: 70 BPM | DIASTOLIC BLOOD PRESSURE: 78 MMHG

## 2023-01-14 DIAGNOSIS — F39 MOOD DISORDER (HCC): Primary | ICD-10-CM

## 2023-01-14 DIAGNOSIS — Z92.89 HISTORY OF RENAL DIALYSIS: ICD-10-CM

## 2023-01-14 LAB
ANION GAP SERPL CALCULATED.3IONS-SCNC: 11 MMOL/L (ref 4–13)
ATRIAL RATE: 73 BPM
BASOPHILS # BLD AUTO: 0.03 THOUSANDS/ÂΜL (ref 0–0.1)
BASOPHILS NFR BLD AUTO: 1 % (ref 0–1)
BUN SERPL-MCNC: 52 MG/DL (ref 5–25)
CALCIUM SERPL-MCNC: 9.4 MG/DL (ref 8.4–10.2)
CHLORIDE SERPL-SCNC: 99 MMOL/L (ref 96–108)
CO2 SERPL-SCNC: 28 MMOL/L (ref 21–32)
CREAT SERPL-MCNC: 8.37 MG/DL (ref 0.6–1.3)
EOSINOPHIL # BLD AUTO: 0.1 THOUSAND/ÂΜL (ref 0–0.61)
EOSINOPHIL NFR BLD AUTO: 2 % (ref 0–6)
ERYTHROCYTE [DISTWIDTH] IN BLOOD BY AUTOMATED COUNT: 14.8 % (ref 11.6–15.1)
ETHANOL EXG-MCNC: NORMAL MG/DL
ETHANOL SERPL-MCNC: <10 MG/DL
FLUAV RNA RESP QL NAA+PROBE: NEGATIVE
FLUBV RNA RESP QL NAA+PROBE: NEGATIVE
GFR SERPL CREATININE-BSD FRML MDRD: 6 ML/MIN/1.73SQ M
GLUCOSE SERPL-MCNC: 115 MG/DL (ref 65–140)
HCT VFR BLD AUTO: 36.8 % (ref 36.5–49.3)
HGB BLD-MCNC: 11.5 G/DL (ref 12–17)
IMM GRANULOCYTES # BLD AUTO: 0.07 THOUSAND/UL (ref 0–0.2)
IMM GRANULOCYTES NFR BLD AUTO: 1 % (ref 0–2)
LYMPHOCYTES # BLD AUTO: 1.29 THOUSANDS/ÂΜL (ref 0.6–4.47)
LYMPHOCYTES NFR BLD AUTO: 24 % (ref 14–44)
MCH RBC QN AUTO: 30.3 PG (ref 26.8–34.3)
MCHC RBC AUTO-ENTMCNC: 31.3 G/DL (ref 31.4–37.4)
MCV RBC AUTO: 97 FL (ref 82–98)
MONOCYTES # BLD AUTO: 0.65 THOUSAND/ÂΜL (ref 0.17–1.22)
MONOCYTES NFR BLD AUTO: 12 % (ref 4–12)
NEUTROPHILS # BLD AUTO: 3.35 THOUSANDS/ÂΜL (ref 1.85–7.62)
NEUTS SEG NFR BLD AUTO: 60 % (ref 43–75)
NRBC BLD AUTO-RTO: 0 /100 WBCS
P AXIS: 67 DEGREES
PLATELET # BLD AUTO: 132 THOUSANDS/UL (ref 149–390)
PMV BLD AUTO: 10.1 FL (ref 8.9–12.7)
POTASSIUM SERPL-SCNC: 4.8 MMOL/L (ref 3.5–5.3)
PR INTERVAL: 176 MS
QRS AXIS: -10 DEGREES
QRSD INTERVAL: 162 MS
QT INTERVAL: 448 MS
QTC INTERVAL: 493 MS
RBC # BLD AUTO: 3.79 MILLION/UL (ref 3.88–5.62)
RSV RNA RESP QL NAA+PROBE: NEGATIVE
SARS-COV-2 RNA RESP QL NAA+PROBE: NEGATIVE
SODIUM SERPL-SCNC: 138 MMOL/L (ref 135–147)
T WAVE AXIS: 75 DEGREES
VENTRICULAR RATE: 73 BPM
WBC # BLD AUTO: 5.49 THOUSAND/UL (ref 4.31–10.16)

## 2023-01-14 NOTE — DISCHARGE INSTRUCTIONS
Return to the Emergency Department sooner if increased pain, numbness, weakness, fever, vomiting, diarrhea, incontinence, difficulty walking or breathing or urinating, rash  Please continue to take your medications as scheduled as well as going to your dialysis treatments as scheduled
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
## 2023-01-14 NOTE — ED ATTENDING ATTESTATION
1/14/2023  IKarine MD, saw and evaluated the patient  I have discussed the patient with the resident/non-physician practitioner and agree with the resident's/non-physician practitioner's findings, Plan of Care, and MDM as documented in the resident's/non-physician practitioner's note, except where noted  All available labs and Radiology studies were reviewed  I was present for key portions of any procedure(s) performed by the resident/non-physician practitioner and I was immediately available to provide assistance  At this point I agree with the current assessment done in the Emergency Department  I have conducted an independent evaluation of this patient a history and physical is as follows: Patient denies any homicidal or suicidal ideation  He has had labile moods at home regarding his inability to lose additional 5 pounds to qualify for kidney transplant  He also has increased stress at home  He denies any acute medical complaints  He missed his 6 AM dialysis appointment today  Family has called the dialysis center and they are able to get him in for dialysis at 0930  Laboratory studies and EKG with no acute emergent indication for dialysis at this time  He has no suicidal or homicidal ideation that would warrant acute crisis evaluation  Patient is stable for discharge home, will be driven directly to dialysis by his family  Review of Systems - Negative except labile mood  Physical Exam  Vitals and nursing note reviewed  Constitutional:       General: He is not in acute distress  Appearance: He is well-developed  He is not diaphoretic  HENT:      Head: Normocephalic and atraumatic  Right Ear: External ear normal       Left Ear: External ear normal    Eyes:      General:         Right eye: No discharge  Left eye: No discharge  Pupils: Pupils are equal, round, and reactive to light  Neck:      Thyroid: No thyromegaly        Trachea: No tracheal deviation  Cardiovascular:      Rate and Rhythm: Normal rate and regular rhythm  Heart sounds: No murmur heard  Pulmonary:      Effort: Pulmonary effort is normal       Breath sounds: Normal breath sounds  Abdominal:      General: Bowel sounds are normal  There is no distension  Palpations: Abdomen is soft  Tenderness: There is no abdominal tenderness  Musculoskeletal:         General: No deformity  Normal range of motion  Cervical back: Normal range of motion and neck supple  Skin:     General: Skin is warm  Capillary Refill: Capillary refill takes less than 2 seconds  Neurological:      Mental Status: He is alert and oriented to person, place, and time  Cranial Nerves: No cranial nerve deficit  Motor: No abnormal muscle tone     Psychiatric:         Behavior: Behavior normal        Results Reviewed     Procedure Component Value Units Date/Time    Ethanol [[id number]]  (Normal) Collected: 01/14/23 0754    Lab Status: Final result Specimen: Blood from Arm, Right Updated: 01/14/23 0834     Ethanol Lvl <10 mg/dL     Basic metabolic panel [[id number]]  (Abnormal) Collected: 01/14/23 0754    Lab Status: Final result Specimen: Blood from Arm, Right Updated: 01/14/23 0834     Sodium 138 mmol/L      Potassium 4 8 mmol/L      Chloride 99 mmol/L      CO2 28 mmol/L      ANION GAP 11 mmol/L      BUN 52 mg/dL      Creatinine 8 37 mg/dL      Glucose 115 mg/dL      Calcium 9 4 mg/dL      eGFR 6 ml/min/1 73sq m     Narrative:      [place] guidelines for Chronic Kidney Disease (CKD):   •  Stage 1 with normal or high GFR (GFR > 90 mL/min/1 73 square meters)  •  Stage 2 Mild CKD (GFR = 60-89 mL/min/1 73 square meters)  •  Stage 3A Moderate CKD (GFR = 45-59 mL/min/1 73 square meters)  •  Stage 3B Moderate CKD (GFR = 30-44 mL/min/1 73 square meters)  •  Stage 4 Severe CKD (GFR = 15-29 mL/min/1 73 square meters)  •  Stage 5 End Stage CKD (GFR <15 mL/min/1 73 square meters)  Note: GFR calculation is accurate only with a steady state creatinine    CBC and differential [[id number]]  (Abnormal) Collected: 01/14/23 0754    Lab Status: Final result Specimen: Blood from Arm, Right Updated: 01/14/23 0814     WBC 5 49 Thousand/uL      RBC 3 79 Million/uL      Hemoglobin 11 5 g/dL      Hematocrit 36 8 %      MCV 97 fL      MCH 30 3 pg      MCHC 31 3 g/dL      RDW 14 8 %      MPV 10 1 fL      Platelets 053 Thousands/uL      nRBC 0 /100 WBCs      Neutrophils Relative 60 %      Immat GRANS % 1 %      Lymphocytes Relative 24 %      Monocytes Relative 12 %      Eosinophils Relative 2 %      Basophils Relative 1 %      Neutrophils Absolute 3 35 Thousands/µL      Immature Grans Absolute 0 07 Thousand/uL      Lymphocytes Absolute 1 29 Thousands/µL      Monocytes Absolute 0 65 Thousand/µL      Eosinophils Absolute 0 10 Thousand/µL      Basophils Absolute 0 03 Thousands/µL     FLU/RSV/COVID - if FLU/RSV clinically relevant [[id number]] Collected: 01/14/23 0754    Lab Status:  In process Specimen: Nares from Nose Updated: 01/14/23 0759    POCT alcohol breath test [[id number]]  (Normal) Resulted: 01/14/23 0742    Lab Status: Final result Updated: 01/14/23 0743     EXTBreath Alcohol will send blood instead/ pt confused              ED Course         Critical Care Time  Procedures

## 2023-01-14 NOTE — ED NOTES
Per family at bedside pt has been refusing dialysis + confusion at home, pt refusing to take medication        Antonio Nicole, THIERNO  01/14/23 0291

## 2023-01-23 ENCOUNTER — TELEPHONE (OUTPATIENT)
Dept: PSYCHIATRY | Facility: CLINIC | Age: 63
End: 2023-01-23

## 2023-01-23 ENCOUNTER — TELEPHONE (OUTPATIENT)
Dept: INTERNAL MEDICINE CLINIC | Facility: CLINIC | Age: 63
End: 2023-01-23

## 2023-01-23 NOTE — TELEPHONE ENCOUNTER
Pts daughter called in seeking an appt for her father  Pts daughter stated she has taken to him the ER and spoke with people at the crisis line and they have all recommenced her to contact us and to get him seen as soon as possible  Writer reminded her we are working off a wait list and he is currently on the wait list with several referrals in chart  Writer told pts daughter the pts chart would be sent to intake but nothing is guaranteed

## 2023-01-23 NOTE — TELEPHONE ENCOUNTER
chantel called asking if you caould prescribe something for bilateral ear pain / thinks its an infection    Please advise    [phone]

## 2023-01-23 NOTE — TELEPHONE ENCOUNTER
Can he come in today for an appointment so I can evaluate him and see what he needs for treatment    john

## 2023-01-23 NOTE — TELEPHONE ENCOUNTER
Loyda Reyes will be in dialysis until late this afternoon and his wife is unavailable - working to tomorrow  She will try to brng him to urgent care after dialysis today

## 2023-01-25 ENCOUNTER — TELEPHONE (OUTPATIENT)
Dept: INTERNAL MEDICINE CLINIC | Facility: CLINIC | Age: 63
End: 2023-01-25

## 2023-01-25 ENCOUNTER — APPOINTMENT (EMERGENCY)
Dept: RADIOLOGY | Facility: HOSPITAL | Age: 63
End: 2023-01-25

## 2023-01-25 ENCOUNTER — HOSPITAL ENCOUNTER (OUTPATIENT)
Facility: HOSPITAL | Age: 63
Setting detail: OBSERVATION
Discharge: HOME | End: 2023-01-26
Attending: EMERGENCY MEDICINE | Admitting: INTERNAL MEDICINE

## 2023-01-25 DIAGNOSIS — F41.8 ANXIETY ASSOCIATED WITH DEPRESSION: ICD-10-CM

## 2023-01-25 DIAGNOSIS — R77.8 ELEVATED TROPONIN: ICD-10-CM

## 2023-01-25 DIAGNOSIS — F69 BEHAVIOR CONCERN IN ADULT: ICD-10-CM

## 2023-01-25 DIAGNOSIS — N18.6 ESRD ON DIALYSIS (HCC): ICD-10-CM

## 2023-01-25 DIAGNOSIS — R53.1 WEAKNESS: Primary | ICD-10-CM

## 2023-01-25 DIAGNOSIS — R26.2 AMBULATORY DYSFUNCTION: ICD-10-CM

## 2023-01-25 DIAGNOSIS — Z99.2 ESRD ON DIALYSIS (HCC): ICD-10-CM

## 2023-01-25 LAB
2HR DELTA HS TROPONIN: -14 NG/L
ALBUMIN SERPL BCP-MCNC: 4.3 G/DL (ref 3.5–5)
ALP SERPL-CCNC: 81 U/L (ref 34–104)
ALT SERPL W P-5'-P-CCNC: 10 U/L (ref 7–52)
ANION GAP SERPL CALCULATED.3IONS-SCNC: 10 MMOL/L (ref 4–13)
AST SERPL W P-5'-P-CCNC: 11 U/L (ref 13–39)
BASOPHILS # BLD AUTO: 0.03 THOUSANDS/ÂΜL (ref 0–0.1)
BASOPHILS NFR BLD AUTO: 1 % (ref 0–1)
BILIRUB SERPL-MCNC: 0.64 MG/DL (ref 0.2–1)
BNP SERPL-MCNC: 499 PG/ML (ref 0–100)
BUN SERPL-MCNC: 23 MG/DL (ref 5–25)
CALCIUM SERPL-MCNC: 9.4 MG/DL (ref 8.4–10.2)
CARDIAC TROPONIN I PNL SERPL HS: 122 NG/L
CARDIAC TROPONIN I PNL SERPL HS: 136 NG/L
CHLORIDE SERPL-SCNC: 93 MMOL/L (ref 96–108)
CO2 SERPL-SCNC: 33 MMOL/L (ref 21–32)
CREAT SERPL-MCNC: 4.5 MG/DL (ref 0.6–1.3)
EOSINOPHIL # BLD AUTO: 0.07 THOUSAND/ÂΜL (ref 0–0.61)
EOSINOPHIL NFR BLD AUTO: 1 % (ref 0–6)
ERYTHROCYTE [DISTWIDTH] IN BLOOD BY AUTOMATED COUNT: 15 % (ref 11.6–15.1)
GFR SERPL CREATININE-BSD FRML MDRD: 13 ML/MIN/1.73SQ M
GLUCOSE SERPL-MCNC: 127 MG/DL (ref 65–140)
GLUCOSE SERPL-MCNC: 145 MG/DL (ref 65–140)
HCT VFR BLD AUTO: 31.5 % (ref 36.5–49.3)
HGB BLD-MCNC: 10.1 G/DL (ref 12–17)
IMM GRANULOCYTES # BLD AUTO: 0.05 THOUSAND/UL (ref 0–0.2)
IMM GRANULOCYTES NFR BLD AUTO: 1 % (ref 0–2)
LYMPHOCYTES # BLD AUTO: 0.9 THOUSANDS/ÂΜL (ref 0.6–4.47)
LYMPHOCYTES NFR BLD AUTO: 16 % (ref 14–44)
MCH RBC QN AUTO: 30.1 PG (ref 26.8–34.3)
MCHC RBC AUTO-ENTMCNC: 32.1 G/DL (ref 31.4–37.4)
MCV RBC AUTO: 94 FL (ref 82–98)
MONOCYTES # BLD AUTO: 0.72 THOUSAND/ÂΜL (ref 0.17–1.22)
MONOCYTES NFR BLD AUTO: 13 % (ref 4–12)
NEUTROPHILS # BLD AUTO: 3.85 THOUSANDS/ÂΜL (ref 1.85–7.62)
NEUTS SEG NFR BLD AUTO: 68 % (ref 43–75)
NRBC BLD AUTO-RTO: 0 /100 WBCS
PLATELET # BLD AUTO: 129 THOUSANDS/UL (ref 149–390)
PMV BLD AUTO: 10 FL (ref 8.9–12.7)
POTASSIUM SERPL-SCNC: 3.6 MMOL/L (ref 3.5–5.3)
PROT SERPL-MCNC: 7.9 G/DL (ref 6.4–8.4)
RBC # BLD AUTO: 3.35 MILLION/UL (ref 3.88–5.62)
SODIUM SERPL-SCNC: 136 MMOL/L (ref 135–147)
WBC # BLD AUTO: 5.62 THOUSAND/UL (ref 4.31–10.16)

## 2023-01-25 RX ORDER — ACETAMINOPHEN 325 MG/1
650 TABLET ORAL EVERY 6 HOURS PRN
Status: DISCONTINUED | OUTPATIENT
Start: 2023-01-25 | End: 2023-01-26 | Stop reason: HOSPADM

## 2023-01-25 RX ORDER — HEPARIN SODIUM 5000 [USP'U]/ML
5000 INJECTION, SOLUTION INTRAVENOUS; SUBCUTANEOUS EVERY 8 HOURS SCHEDULED
Status: DISCONTINUED | OUTPATIENT
Start: 2023-01-25 | End: 2023-01-26 | Stop reason: HOSPADM

## 2023-01-25 RX ORDER — DIVALPROEX SODIUM 125 MG/1
250 CAPSULE, COATED PELLETS ORAL EVERY 12 HOURS SCHEDULED
Qty: 120 CAPSULE | Refills: 1 | Status: SHIPPED | OUTPATIENT
Start: 2023-01-25

## 2023-01-25 RX ORDER — SODIUM CHLORIDE 9 MG/ML
75 INJECTION, SOLUTION INTRAVENOUS CONTINUOUS
Status: DISCONTINUED | OUTPATIENT
Start: 2023-01-25 | End: 2023-01-25

## 2023-01-25 RX ORDER — INSULIN LISPRO 100 [IU]/ML
4 INJECTION, SOLUTION INTRAVENOUS; SUBCUTANEOUS
Status: DISCONTINUED | OUTPATIENT
Start: 2023-01-26 | End: 2023-01-26 | Stop reason: HOSPADM

## 2023-01-25 RX ORDER — PRASUGREL 10 MG/1
5 TABLET, FILM COATED ORAL DAILY
Status: DISCONTINUED | OUTPATIENT
Start: 2023-01-26 | End: 2023-01-26 | Stop reason: HOSPADM

## 2023-01-25 RX ORDER — MELATONIN
1000 DAILY
Status: DISCONTINUED | OUTPATIENT
Start: 2023-01-26 | End: 2023-01-26 | Stop reason: HOSPADM

## 2023-01-25 RX ORDER — ASPIRIN 81 MG/1
81 TABLET ORAL DAILY
Status: DISCONTINUED | OUTPATIENT
Start: 2023-01-26 | End: 2023-01-26 | Stop reason: HOSPADM

## 2023-01-25 RX ORDER — ATORVASTATIN CALCIUM 40 MG/1
40 TABLET, FILM COATED ORAL
Status: DISCONTINUED | OUTPATIENT
Start: 2023-01-26 | End: 2023-01-26 | Stop reason: HOSPADM

## 2023-01-25 RX ORDER — DIVALPROEX SODIUM 125 MG/1
250 CAPSULE, COATED PELLETS ORAL EVERY 12 HOURS SCHEDULED
Status: DISCONTINUED | OUTPATIENT
Start: 2023-01-25 | End: 2023-01-26 | Stop reason: HOSPADM

## 2023-01-25 RX ORDER — CARVEDILOL 12.5 MG/1
12.5 TABLET ORAL 2 TIMES DAILY WITH MEALS
Status: DISCONTINUED | OUTPATIENT
Start: 2023-01-26 | End: 2023-01-26 | Stop reason: HOSPADM

## 2023-01-25 RX ORDER — ONDANSETRON 2 MG/ML
4 INJECTION INTRAMUSCULAR; INTRAVENOUS EVERY 6 HOURS PRN
Status: DISCONTINUED | OUTPATIENT
Start: 2023-01-25 | End: 2023-01-26 | Stop reason: HOSPADM

## 2023-01-25 RX ORDER — INSULIN GLARGINE 100 [IU]/ML
15 INJECTION, SOLUTION SUBCUTANEOUS EVERY MORNING
Status: DISCONTINUED | OUTPATIENT
Start: 2023-01-26 | End: 2023-01-26 | Stop reason: HOSPADM

## 2023-01-25 RX ORDER — CALCIUM ACETATE 667 MG/1
667 CAPSULE ORAL
Status: DISCONTINUED | OUTPATIENT
Start: 2023-01-26 | End: 2023-01-26 | Stop reason: HOSPADM

## 2023-01-25 RX ADMIN — DIVALPROEX SODIUM 250 MG: 125 CAPSULE, COATED PELLETS ORAL at 23:37

## 2023-01-25 RX ADMIN — SODIUM CHLORIDE 75 ML/HR: 0.9 INJECTION, SOLUTION INTRAVENOUS at 22:33

## 2023-01-25 RX ADMIN — HEPARIN SODIUM 5000 UNITS: 5000 INJECTION INTRAVENOUS; SUBCUTANEOUS at 22:28

## 2023-01-25 NOTE — ED PROVIDER NOTES
History  Chief Complaint   Patient presents with   • Dizziness     Reports dizziness and headaches since this am   Dialysis today until 4pm   Reports feeling unbalanced, not able to ambulate  Denies CP,SOB  Hx stroke     77-year-old male with extensive medical history including previous dementia, CVA, ESRD on MWF dialysis, hypertension, diabetes, hyperlipidemia, presents today with 1 day of generalized weakness after dialysis  Patient received full dialysis earlier today and noted profound weakness afterwards to the point he is nonambulatory  He also mentioned 1 week of bilateral ear pain  Patient states he has not been like this after dialysis before, wife confirmed  Patient not complaining of any pain or any other symptoms  Prior to Admission Medications   Prescriptions Last Dose Informant Patient Reported? Taking? Blood Glucose Monitoring Suppl (True Metrix Meter) w/Device KIT   No No   Sig: Use to test blood sugars 3 times daily   Blood Pressure Monitoring (BLOOD PRESSURE CUFF) MISC   No No   Sig: Use to check blood pressure before taking blood pressure medication and 1 hour after and follow instructions provided in discharge instructions based on the readings     Cholecalciferol (Vitamin D3) 1 25 MG (01164 UT) CAPS   No No   Sig: TAKE 1 CAPSULE BY MOUTH ONE TIME PER WEEK   Insulin Pen Needle (BD Pen Needle Adina U/F) 32G X 4 MM MISC   No No   Sig: Use 4 times daily   Insulin Syringe-Needle U-100 (B-D INS SYRINGE 0 5CC/30GX1/2") 30G X 1/2" 0 5 ML MISC   No No   Sig: Inject once daily   Lancets Ultra Thin 30G MISC   No No   Sig: Use 3 times a day   Methoxy PEG-Epoetin Beta 30 MCG/0 3ML SOSY   Yes No   Si mcg   Patient not taking: Reported on 9987   ONETOUCH DELICA LANCETS 01C MISC   No No   Sig: by Does not apply route 3 (three) times a day   aspirin (ECOTRIN LOW STRENGTH) 81 mg EC tablet   Yes No   Sig: Take 81 mg by mouth daily Resume on      atorvastatin (LIPITOR) 40 mg tablet No No   Sig: TAKE 1 TABLET BY MOUTH EVERY DAY WITH DINNER   benzonatate (TESSALON PERLES) 100 mg capsule   No No   Sig: Take 1 capsule (100 mg total) by mouth 3 (three) times a day as needed for cough   Patient not taking: Reported on 11/2/2022   calcium acetate (CALPHRON) 667 mg   Yes No   Sig: 3 tablets 3x per day   carvedilol (COREG) 12 5 mg tablet   No No   Sig: Take 1 tablet (12 5 mg total) by mouth 2 (two) times a day with meals   divalproex sodium (DEPAKOTE SPRINKLE) 125 MG capsule   No No   Sig: Take 2 capsules (250 mg total) by mouth every 12 (twelve) hours   glucose blood (True Metrix Blood Glucose Test) test strip   No No   Sig: Use 1 each 3 (three) times a day Use as instructed   insulin glargine (Lantus) 100 units/mL subcutaneous injection   No No   Sig: Inject 14 Units under the skin daily   insulin lispro (HumaLOG KwikPen) 100 units/mL injection pen   No No   Sig: INJECT 4 UNITS 3 TIMES A DAY WITH MEALS PLUS SCALE (max daily dose 42 units)   prasugrel (EFFIENT) tablet   Yes No   Sig: Take 1 tablet by mouth daily   torsemide (DEMADEX) 100 mg tablet   No No   Sig: Take 0 5 tablets (50 mg total) by mouth daily As directed by your nephrologist      Facility-Administered Medications: None       Past Medical History:   Diagnosis Date   • Cerebrovascular accident (CVA) due to thrombosis of left middle cerebral artery [facility] 7/29/2018   • Chronic kidney disease    • Diabetes mellitus ([facility] )    • GERD (gastroesophageal reflux disease)    • Hypercholesteremia    • Hyperlipidemia    • Hypertension    • 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   • Neuropathy    • Obesity    • Osteomyelitis ([facility] )     last assessed 11/4/16   • PVC's (premature ventricular contractions)     sees cardiology Dr Shahla camargo   • Stroke [facility])     last weeof July 2018 [facility]   • TIA (transient ischemic attack) 10/28/2018       Past Surgical History:   Procedure Laterality Date   • ABDOMINAL SURGERY     • CARDIAC CATHETERIZATION N/A 5/2/2022    Procedure: Cardiac Coronary Angiogram;  Surgeon: Donna Blandon MD;  [facility]; Service: Cardiology   • CARDIAC CATHETERIZATION N/A 5/2/2022    Procedure: Cardiac pci;  Surgeon: Donna Blandon MD;  [facility] CARD[facility]; Service: Cardiology   • CHOLECYSTECTOMY      Percutaneous   • COLONOSCOPY     • CYSTOSCOPY     • OTHER SURGICAL HISTORY      "stimulator to control bowel movements"   • TN ESOPHAGOGASTRODUODENOSCOPY TRANSORAL DIAGNOSTIC N/A 9/27/2016    Procedure: ESOPHAGOGASTRODUODENOSCOPY (EGD); Surgeon: Ada Khan MD;  Location: [facility] GI LAB; Service: Gastroenterology   • TN LAPAROSCOPY SURG CHOLECYSTECTOMY N/A 2/29/2016    Procedure: LAPAROSCOPIC CHOLECYSTECTOMY ;  Surgeon: Haily Fontaine DO;  Location: [facility] Main OR;  Service: General   • ROTATOR CUFF REPAIR Right    • TOE AMPUTATION Right 10/28/2016    Procedure: 3RD TOE AMPUTATION ;  Surgeon: Darylene Bleak, DPM;  Location: [facility] Main OR;  Service:        Family History   Problem Relation Age of Onset   • Leukemia Mother    • Liver disease Mother    • Lung cancer Mother         heavy smoker - 3 ppd   • Heart disease Father    • Liver disease Father    • Multiple myeloma Sister    • Breast cancer Sister    • Urolithiasis Family    • Alcohol abuse Neg Hx    • Depression Neg Hx    • Drug abuse Neg Hx    • Substance Abuse Neg Hx    • Mental illness Neg Hx      I have reviewed and agree with the history as documented  E-Cigarette/Vaping   • E-Cigarette Use Never User      E-Cigarette/Vaping Substances   • Nicotine No    • THC No    • CBD No    • Flavoring No    • Other No    • Unknown No      Social History     Tobacco Use   • Smoking status: Never   • Smokeless tobacco: Never   Vaping Use   • Vaping Use: Never used   Substance Use Topics   • Alcohol use: Not Currently   • Drug use: No        Review of Systems   Constitutional: Positive for fatigue  Negative for chills and fever     HENT: Negative for hearing loss and rhinorrhea  Eyes: Negative for visual disturbance  Respiratory: Negative for cough and shortness of breath  Cardiovascular: Negative for chest pain  Gastrointestinal: Negative for abdominal pain, constipation, diarrhea, nausea and vomiting  Genitourinary: Negative for dysuria and hematuria  Musculoskeletal: Negative for back pain  Skin: Negative for color change and rash  Neurological: Positive for weakness  Negative for numbness  Psychiatric/Behavioral: Negative for agitation  All other systems reviewed and are negative  Physical Exam  ED Triage Vitals   Temperature Pulse Respirations Blood Pressure SpO2   01/25/23 1914 01/25/23 1914 01/25/23 1912 01/25/23 1914 01/25/23 1914   98 1 °F (36 7 °C) 79 18 150/68 93 %      Temp Source Heart Rate Source Patient Position - Orthostatic VS BP Location FiO2 (%)   01/25/23 1914 01/25/23 1912 -- 01/25/23 1924 --   Oral Monitor  Right arm       Pain Score       --                    Orthostatic Vital Signs  Vitals:    01/25/23 1914 01/25/23 1924 01/25/23 2030 01/25/23 2105   BP: 150/68 123/67 123/58 157/73   Pulse: 79 72 67 73       Physical Exam  Constitutional:       General: He is in acute distress  Appearance: He is ill-appearing  HENT:      Head: Normocephalic and atraumatic  Right Ear: External ear normal  Tympanic membrane is bulging  Tympanic membrane is not erythematous  Left Ear: External ear normal  Tympanic membrane is bulging  Tympanic membrane is not erythematous  Nose: No congestion or rhinorrhea  Mouth/Throat:      Mouth: Mucous membranes are dry  Pharynx: No oropharyngeal exudate  Eyes:      Extraocular Movements: Extraocular movements intact  Pupils: Pupils are equal, round, and reactive to light  Cardiovascular:      Rate and Rhythm: Normal rate and regular rhythm  Pulses: Normal pulses  Heart sounds: Normal heart sounds     Pulmonary:      Effort: Pulmonary effort is normal       Breath sounds: Normal breath sounds  Abdominal:      General: There is distension  Palpations: Abdomen is soft  Tenderness: There is no abdominal tenderness  Musculoskeletal:      Right lower leg: No edema  Left lower leg: No edema  Skin:     General: Skin is warm  Neurological:      Mental Status: Mental status is at baseline  He is disoriented           ED Medications  Medications - No data to display    Diagnostic Studies  Results Reviewed     Procedure Component Value Units Date/Time    HS Troponin I 2hr [[id number]]     Lab Status: No result Specimen: Blood     HS Troponin I 4hr [[id number]]     Lab Status: No result Specimen: Blood     HS Troponin 0hr (reflex protocol) [[id number]]  (Abnormal) Collected: 01/25/23 1958    Lab Status: Final result Specimen: Blood from Arm, Right Updated: 01/25/23 2031     hs TnI 0hr 136 ng/L     B-Type Natriuretic Peptide(BNP) [[id number]]  (Abnormal) Collected: 01/25/23 1958    Lab Status: Final result Specimen: Blood from Arm, Right Updated: 01/25/23 2030      pg/mL     Comprehensive metabolic panel [[id number]]  (Abnormal) Collected: 01/25/23 1958    Lab Status: Final result Specimen: Blood from Arm, Right Updated: 01/25/23 2023     Sodium 136 mmol/L      Potassium 3 6 mmol/L      Chloride 93 mmol/L      CO2 33 mmol/L      ANION GAP 10 mmol/L      BUN 23 mg/dL      Creatinine 4 50 mg/dL      Glucose 145 mg/dL      Calcium 9 4 mg/dL      AST 11 U/L      ALT 10 U/L      Alkaline Phosphatase 81 U/L      Total Protein 7 9 g/dL      Albumin 4 3 g/dL      Total Bilirubin 0 64 mg/dL      eGFR 13 ml/min/1 73sq m     Narrative:      [place] guidelines for Chronic Kidney Disease (CKD):   •  Stage 1 with normal or high GFR (GFR > 90 mL/min/1 73 square meters)  •  Stage 2 Mild CKD (GFR = 60-89 mL/min/1 73 square meters)  •  Stage 3A Moderate CKD (GFR = 45-59 mL/min/1 73 square meters)  •  Stage 3B Moderate CKD (GFR = 30-44 mL/min/1 73 square meters)  •  Stage 4 Severe CKD (GFR = 15-29 mL/min/1 73 square meters)  •  Stage 5 End Stage CKD (GFR <15 mL/min/1 73 square meters)  Note: GFR calculation is accurate only with a steady state creatinine    CBC and differential [[id number]]  (Abnormal) Collected: 01/25/23 1958    Lab Status: Final result Specimen: Blood from Arm, Right Updated: 01/25/23 2005     WBC 5 62 Thousand/uL      RBC 3 35 Million/uL      Hemoglobin 10 1 g/dL      Hematocrit 31 5 %      MCV 94 fL      MCH 30 1 pg      MCHC 32 1 g/dL      RDW 15 0 %      MPV 10 0 fL      Platelets 587 Thousands/uL      nRBC 0 /100 WBCs      Neutrophils Relative 68 %      Immat GRANS % 1 %      Lymphocytes Relative 16 %      Monocytes Relative 13 %      Eosinophils Relative 1 %      Basophils Relative 1 %      Neutrophils Absolute 3 85 Thousands/µL      Immature Grans Absolute 0 05 Thousand/uL      Lymphocytes Absolute 0 90 Thousands/µL      Monocytes Absolute 0 72 Thousand/µL      Eosinophils Absolute 0 07 Thousand/µL      Basophils Absolute 0 03 Thousands/µL     UA w Reflex to Microscopic w Reflex to Culture [[id number]]     Lab Status: No result Specimen: Urine                  XR chest 1 view portable   ED Interpretation by Lesa Malhotra DO (01/25 2130)   Mild pulmonary congestion, no signs of atelectasis or infiltrates  Procedures  Procedures      ED Course  ED Course as of 01/25/23 2132 Wed Jan 25, 2023 2034 BNP(!): 499   2034 hs TnI 0hr(!): 136   2034 Hemoglobin(!): 10 1                                       Medical Decision Making  Patient presented due to profound weakness after dialysis appointment  Patient's wife stated he got the full treatment  Patient is baseline demented, but was able to tell me he is not having any other symptoms including chest pain or short of breath  On physical exam patient appeared weak and fatigued  Labs were remarkable for elevated troponin and BNP    EKG was largely unchanged from previous  Chest x-ray showed mild pulmonary congestion with no signs of infiltrates or atelectasis  Patient's vital signs have been stable since arrival   Patient states he is nonambulatory and unable to stand on his feet due to weakness  Attempt to ambulate the patient was unsuccessful due to him not participating  Patient will be admitted for observation and further evaluation and treatment  Ambulatory dysfunction: acute illness or injury  Elevated troponin: self-limited or minor problem  Weakness: acute illness or injury  Amount and/or Complexity of Data Reviewed  Labs: ordered  Decision-making details documented in ED Course  Radiology: ordered  Risk  Decision regarding hospitalization  Diagnosis or treatment significantly limited by social determinants of health  Disposition  Final diagnoses:   Weakness   Ambulatory dysfunction   Elevated troponin     Time reflects when diagnosis was documented in both MDM as applicable and the Disposition within this note     Time User Action Codes Description Comment    1/25/2023  8:36 PM Diandra Mealing [R53 1] Weakness     1/25/2023  8:36 PM Taiwo Dumont Add [R26 2] Ambulatory dysfunction     1/25/2023  8:36 PM Ricardo Dumont [address] [place] [R77 8] Elevated troponin       ED Disposition     ED Disposition   Admit    Condition   Stable    Date/Time   Wed Jan 25, 2023  8:36 PM    Comment              Follow-up Information    None         Patient's Medications   Discharge Prescriptions    No medications on file     No discharge procedures on file  PDMP Review       Value Time User    PDMP Reviewed  Yes 11/12/2022  6:19 AM Ene De La O MD           ED Provider  Attending physically available and evaluated Suki Resendez I managed the patient along with the ED Attending      Electronically Signed by         Arvind Barrios DO  01/25/23 6594

## 2023-01-25 NOTE — TELEPHONE ENCOUNTER
Yes, can try 250mg twice a day but Umu Goldberg and this medication need to be managed by a psychiatrist    Please now wait till mid or end of February to get the valproic acid blood test    Were they able to get an appt with psychiatry?

## 2023-01-25 NOTE — ED ATTENDING ATTESTATION
1/25/2023  I, Whitney Garcia MD, saw and evaluated the patient  I have discussed the patient with the resident/non-physician practitioner and agree with the resident's/non-physician practitioner's findings, Plan of Care, and MDM as documented in the resident's/non-physician practitioner's note, except where noted  All available labs and Radiology studies were reviewed  I was present for key portions of any procedure(s) performed by the resident/non-physician practitioner and I was immediately available to provide assistance  At this point I agree with the current assessment done in the Emergency Department  I have conducted an independent evaluation of this patient a history and physical is as follows: This is a 70-year-old male patient with a relevant past medical history of ESRD on dialysis Monday Wednesday Friday, CAD, CVA with resultant behavioral changes, diabetes, hypertension, dementia, presenting to the ED today for complaint of weakness  Patient had dialysis earlier today and ever since dialysis he has been weak  He has been unable to ambulate when normally he is ambulatory  Patient denies any chest pain, shortness of breath, but has had some bilateral ear pain  He also has been having a slight cough  He does not have a fever, chills, sweats, nausea, vomiting, abdominal pain, or any other significantly related symptoms  His exam is remarkable for chronically ill patient with out any signs of acute distress  He has bilateral TM bulging without any significant erythema to suggest infection  He likely has middle ear effusion  His lung sounds are distant at the bilateral bases, but likely this is due to body habitus  His exam otherwise is unremarkable  His differential diagnosis includes: Pneumonia versus viral upper respiratory tract infection versus atypical ACS versus other  Patient had a CBC showing a slight anemia with a hemoglobin lower than his baseline    His metabolic panel shows an elevated creatinine, but he does have chronic renal insufficiency for which he is on dialysis  His BNP is lower than it has been previously, but his troponin is more than double what it has been previously  Patient has an EKG showing some minimal ST depressions in the septal leads, and an x-ray showing no evidence for pneumonia  Patient likely is experiencing demand related ischemia in the setting of a viral illness, causing him to be weak  Patient hospitalized onto the hospitalist service for trending of troponins, reevaluation  He likely has a middle ear effusion due to his viral illness, which I do not believe would benefit from antibiotics      ED Course         Critical Care Time  Procedures

## 2023-01-25 NOTE — TELEPHONE ENCOUNTER
PT'S DAUGHTER CALLED, SAID THAT WHEN SHE HAD DAD AT HIS LAST APPT YOU TOLD THEM TO LET YOU KNOW IF THE DEPAKOTE DOES NOT WORK    SHE SAID THAT DAD IS GETTING WORSE, HE'S "HAVING TANTRUMS AND IS SMACKING HIMSELF"    SHE'S ASKING IF THEY CAN INCREASE THIS MEDICATION OR CHANGE THE MEDICATION

## 2023-01-25 NOTE — TELEPHONE ENCOUNTER
SPOKE WITH PT'S DAUGHTER, GAVE MESSAGE     SHE SAID THAT SHE'S TOLD THE WAIT IS 3 YEARS FOR DAD TO SEE PSYCHIATRY SO SHE'S NOT SURE WHAT TO DO    ASKED THAT YOU ORDER MORE OF THIS MEDICATION SINCE IT'S GOING TO BE INCREASED

## 2023-01-25 NOTE — TELEPHONE ENCOUNTER
Prescription sent with new/higher dose    Qing, Can you send a message to [facility] to see if they can move up this patient's appointment?     For Sirtiary -> diagnosis: mood disorder and taking depakote but it is not helping adequately    thanks

## 2023-01-26 LAB
4HR DELTA HS TROPONIN: -17 NG/L
ANION GAP SERPL CALCULATED.3IONS-SCNC: 9 MMOL/L (ref 4–13)
BACTERIA UR QL AUTO: NORMAL /HPF
BILIRUB UR QL STRIP: NEGATIVE
BUN SERPL-MCNC: 29 MG/DL (ref 5–25)
CALCIUM SERPL-MCNC: 9.1 MG/DL (ref 8.4–10.2)
CARDIAC TROPONIN I PNL SERPL HS: 119 NG/L
CHLORIDE SERPL-SCNC: 97 MMOL/L (ref 96–108)
CLARITY UR: ABNORMAL
CO2 SERPL-SCNC: 32 MMOL/L (ref 21–32)
COLOR UR: ABNORMAL
CREAT SERPL-MCNC: 5.61 MG/DL (ref 0.6–1.3)
ERYTHROCYTE [DISTWIDTH] IN BLOOD BY AUTOMATED COUNT: 15 % (ref 11.6–15.1)
FLUAV RNA RESP QL NAA+PROBE: NEGATIVE
FLUBV RNA RESP QL NAA+PROBE: NEGATIVE
GFR SERPL CREATININE-BSD FRML MDRD: 9 ML/MIN/1.73SQ M
GLUCOSE SERPL-MCNC: 105 MG/DL (ref 65–140)
GLUCOSE SERPL-MCNC: 125 MG/DL (ref 65–140)
GLUCOSE SERPL-MCNC: 95 MG/DL (ref 65–140)
GLUCOSE UR STRIP-MCNC: ABNORMAL MG/DL
HCT VFR BLD AUTO: 31.4 % (ref 36.5–49.3)
HGB BLD-MCNC: 10.1 G/DL (ref 12–17)
HGB UR QL STRIP.AUTO: ABNORMAL
KETONES UR STRIP-MCNC: NEGATIVE MG/DL
LEUKOCYTE ESTERASE UR QL STRIP: NEGATIVE
MCH RBC QN AUTO: 30.7 PG (ref 26.8–34.3)
MCHC RBC AUTO-ENTMCNC: 32.2 G/DL (ref 31.4–37.4)
MCV RBC AUTO: 95 FL (ref 82–98)
NITRITE UR QL STRIP: NEGATIVE
NON-SQ EPI CELLS URNS QL MICRO: NORMAL /HPF
PH UR STRIP.AUTO: 8.5 [PH]
PLATELET # BLD AUTO: 140 THOUSANDS/UL (ref 149–390)
PMV BLD AUTO: 10.3 FL (ref 8.9–12.7)
POTASSIUM SERPL-SCNC: 4.4 MMOL/L (ref 3.5–5.3)
PROT UR STRIP-MCNC: ABNORMAL MG/DL
RBC # BLD AUTO: 3.29 MILLION/UL (ref 3.88–5.62)
RBC #/AREA URNS AUTO: NORMAL /HPF
RSV RNA RESP QL NAA+PROBE: NEGATIVE
SARS-COV-2 RNA RESP QL NAA+PROBE: NEGATIVE
SODIUM SERPL-SCNC: 138 MMOL/L (ref 135–147)
SP GR UR STRIP.AUTO: 1.01 (ref 1–1.03)
TSH SERPL DL<=0.05 MIU/L-ACNC: 2.46 UIU/ML (ref 0.45–4.5)
UROBILINOGEN UR STRIP-ACNC: <2 MG/DL
WBC # BLD AUTO: 5.48 THOUSAND/UL (ref 4.31–10.16)
WBC #/AREA URNS AUTO: NORMAL /HPF

## 2023-01-26 RX ORDER — INSULIN LISPRO 100 [IU]/ML
1-6 INJECTION, SOLUTION INTRAVENOUS; SUBCUTANEOUS
Status: DISCONTINUED | OUTPATIENT
Start: 2023-01-26 | End: 2023-01-26 | Stop reason: HOSPADM

## 2023-01-26 RX ADMIN — INSULIN LISPRO 4 UNITS: 100 INJECTION, SOLUTION INTRAVENOUS; SUBCUTANEOUS at 12:44

## 2023-01-26 RX ADMIN — INSULIN GLARGINE 15 UNITS: 100 INJECTION, SOLUTION SUBCUTANEOUS at 08:00

## 2023-01-26 RX ADMIN — PRASUGREL HYDROCHLORIDE 5 MG: 10 TABLET, FILM COATED ORAL at 08:01

## 2023-01-26 RX ADMIN — TORSEMIDE 50 MG: 20 TABLET ORAL at 08:00

## 2023-01-26 RX ADMIN — CALCIUM ACETATE 667 MG: 667 CAPSULE ORAL at 08:00

## 2023-01-26 RX ADMIN — Medication 1000 UNITS: at 08:00

## 2023-01-26 RX ADMIN — ASPIRIN 81 MG: 81 TABLET, COATED ORAL at 08:00

## 2023-01-26 RX ADMIN — DIVALPROEX SODIUM 250 MG: 125 CAPSULE, COATED PELLETS ORAL at 08:01

## 2023-01-26 RX ADMIN — HEPARIN SODIUM 5000 UNITS: 5000 INJECTION INTRAVENOUS; SUBCUTANEOUS at 04:55

## 2023-01-26 RX ADMIN — CALCIUM ACETATE 667 MG: 667 CAPSULE ORAL at 12:44

## 2023-01-26 RX ADMIN — INSULIN LISPRO 4 UNITS: 100 INJECTION, SOLUTION INTRAVENOUS; SUBCUTANEOUS at 09:07

## 2023-01-26 RX ADMIN — CARVEDILOL 12.5 MG: 12.5 TABLET, FILM COATED ORAL at 08:00

## 2023-01-26 NOTE — ASSESSMENT & PLAN NOTE
Lab Results   Component Value Date    EGFR 9 01/26/2023    EGFR 13 01/25/2023    EGFR 6 01/14/2023    CREATININE 5 61 (H) 01/26/2023    CREATININE 4 50 (H) 01/25/2023    CREATININE 8 37 (H) 01/14/2023     · Patient receives dialysis on a Monday, Wednesday, and Friday schedule    Per patient's spouse and daughter, patient was significantly more weak following dialysis today, which is atypical     Plan:  · Continue with HD outpatient

## 2023-01-26 NOTE — DISCHARGE INSTR - AVS FIRST PAGE
Dear Laura Cosme,     It was our pleasure to care for you here at [facility]  It is our hope that we were always able to exceed the expected standards for your care during your stay  You were hospitalized due to generalized weakness  You were cared for on the medical-surgical floor by Shea Quintanilla DO under the service of Tara Colin MD with the [facility] who covers for your primary care physician (PCP), Prabhu Cook DO, while you were hospitalized  If you have any questions or concerns related to this hospitalization, you may contact us at [phone]  For follow up as well as any medication refills, we recommend that you follow up with your primary care physician  A registered nurse will reach out to you by phone within a few days after your discharge to answer any additional questions that you may have after going home  However, at this time we provide for you here, the most important instructions / recommendations at discharge:     Notable Medication Adjustments -   None  Testing Required after Discharge -   None  Important follow up information -   Follow-up with PCP for transition of care visit  Resume outpatient dialysis schedule    Please review this entire after visit summary as additional general instructions including medication list, appointments, activity, diet, any pertinent wound care, and other additional recommendations from your care team that may be provided for you        Sincerely,     Shea Quintanilla DO

## 2023-01-26 NOTE — DISCHARGE SUMMARY
[facility]  Discharge- Agatha Danger 1960, 58 y o  male MRN: [id number]  Unit/Bed#: S -01 Encounter: [id number]  Primary Care Provider: Esteban Cristobal DO   Date and time admitted to hospital: 1/25/2023  7:09 PM    * Generalized weakness  Assessment & Plan  · Presented with one day of generalized weakness after dialysis session today  Patient's spouse and daughter report that the patient is normally not presenting this way following dialysis  He was so weak to the point that he was unable to ambulate  However, according to the patient's daughter he normally has days where he is able to and unable to ambulate at baseline  · He reports bilateral ear pain and a mild cough  Denies any fevers and chills and sick contacts  · Initial troponin level in the ED was found to be elevated to 136  BNP was found to be elevated 499 but is reduced from prior value of 737  Both findings not unexpected in the setting of ESRD  · EKG showed normal sinus rhythm with mild ST segment depressions in the anterior septal leads V3 and V4  Troponins: 136->122->119, decreasing   · Slight drop of hemoglobin from baseline (11 5 > 10 1), which is likely secondary to ESRD  He denies any melena, hematochezia, hematemesis, and hematuria and there is no evidence of a source of active bleeding  · COVID-19/Influenza/RSV negative  · CXR showed no evidence of infiltrates or consolidations  · Suspect possible upper respiratory viral illness causing elevated troponin secondary to demand ischemia  His bilateral ear pain could likely be due to a viral URI  On exam, patient does not have bulging tympanic membranes bilaterally and there is no appreciable erythema    · UA: pH 8 5, protein 2+, glucose 300, 3/10%, UA micro-negative  · TSH: 2 455, normal  · Other differentials include atypical presentation of ACS vs UTI vs less likely pneumonia    Plan:  · Up and OOB as tolerated  · Symptomatic management  · PT/OT evaluation- Pt declined walker    ESRD on dialysis [facility])  Assessment & Plan  Lab Results   Component Value Date    EGFR 9 01/26/2023    EGFR 13 01/25/2023    EGFR 6 01/14/2023    CREATININE 5 61 (H) 01/26/2023    CREATININE 4 50 (H) 01/25/2023    CREATININE 8 37 (H) 01/14/2023     · Patient receives dialysis on a Monday, Wednesday, and Friday schedule  Per patient's spouse and daughter, patient was significantly more weak following dialysis today, which is atypical     Plan:  · Continue with HD outpatient       Anemia  Assessment & Plan  · POA hemoglobin 10 1 (baseline 11 5)  · Suspect acute drop in hemoglobin secondary to normal variation in the setting of ESRD  · No active source or signs of bleeding at this time    Recent Labs     01/25/23  1958 01/26/23  0453   HGB 10 1* 10 1*       Plan:  · Stable    Primary hypertension  Assessment & Plan  · Blood pressures stable this admission  · Continue carvedilol 12 5 mg BID and torsemide 50 mg daily      Mixed hyperlipidemia  Assessment & Plan  · Continue atorvastatin 40 mg daily    Type 2 diabetes mellitus with chronic kidney disease on chronic dialysis, with long-term current use of insulin [facility])  Assessment & Plan  Lab Results   Component Value Date    HGBA1C 5 5 12/23/2022       Recent Labs     01/25/23 2227   POCGLU 127       Blood Sugar Average: Last 72 hrs:  (P) 127     Home Regimen: Lantus 15 units every morning, Humalog 4 units three times daily with meals    Plan:  Resume home regimen    Medical Problems     Resolved Problems  Date Reviewed: 1/25/2023   None       Discharging Resident: Teresa Evans DO  Discharging Attending: No att  providers found  PCP: Cat Davis DO  Admission Date:   Admission Orders (From admission, onward)     Ordered        01/25/23 2123  Place in Observation  Once                      Discharge Date: 01/26/23    Consultations During Hospital Stay:  · None    Procedures Performed:   · None    Significant Findings / Test Results:   · None     Incidental Findings:   · None   · I reviewed the above mentioned incidental findings with the patient and/or family and they expressed understanding  Test Results Pending at Discharge (will require follow up): · None     Outpatient Tests Requested:  · None    Complications:  None    Reason for Admission: generalized weakness  Hospital Course:   Aida Baugh is a 58 y o  male with a PMH of ESRD on dialysis, dementia, CVA in 2018, type 2 diabetes mellitus, hypertension, hyperlipidemia, and GERD who originally presented to the hospital on 1/25/2023 due to generalized weakness  Per patient's spouse and daughter, patient started experiencing significant weakness following his dialysis session  He was so weak to the point where he was unable to ambulate on his own  He has never presented this way following hemodialysis sessions in the past, so he was brought to the emergency department for further evaluation  Patient's daughter did report that at baseline he has days where he is able to ambulate and not able to ambulate  Patient's spouse reports that the patient did have chest pain yesterday that was described as persistent  The spouse initially thought the chest pain was due to reflux  In the ED, patient denies any present chest pain, shortness of breath, fever, chills, dysuria, hematuria, urinary frequency, foul-smelling urine, nausea, vomiting, abdominal pain, melena, hematochezia, and hemoptysis  He denies any sick contacts  He does endorse bilateral ear pain that started 3 days ago and a slight nonproductive cough  Infectious and ischemic work-up was initiated  His troponin levels were initially elevated by trended down  Infectious work-up was negative, including negative covid/flu/rsv, negative CXR, negative UA, and no leukocytosis  Mostly patient has a viral URI  Prior to discharge he denied any ear pain, chest pain, cough, fever, or chills   PT/OT was consulted to see patient, and patient and son denied wanting a walker upon discharge  He was medically and hemodynamically stable for discharge  He can resume dialysis as normal outpatient  Please see above list of diagnoses and related plan for additional information  Condition at Discharge: good    Discharge Day Visit / Exam:   Subjective:    Patient evaluated at bedside  Denied any fever, chills, chest pain, SOB, cough, ear pain, diarrhea  Reports no complaints  Vitals: Blood Pressure: 149/76 (01/26/23 1655)  Pulse: 74 (01/26/23 1655)  Temperature: 98 °F (36 7 °C) (01/26/23 1655)  Temp Source: Oral (01/25/23 1914)  Respirations: 18 (01/26/23 1655)  Weight - Scale: 100 kg (221 lb) (pt refused to stand on scale untill he got his breakfest) (01/26/23 0600)  SpO2: 99 % (01/26/23 1655)  Exam:   Physical Exam  Vitals and nursing note reviewed  Constitutional:       General: He is not in acute distress  Appearance: He is not ill-appearing  HENT:      Right Ear: Ear canal and external ear normal  Tympanic membrane is not injected, scarred or bulging  Left Ear: Ear canal and external ear normal  Tympanic membrane is not injected, scarred or bulging  Ears:      Comments: Right ear: TM had small plaque on TM, most likely epithelial cells    Left ear: serous fluid but not bulging     Mouth/Throat:      Mouth: Mucous membranes are dry  Pharynx: Oropharynx is clear  Cardiovascular:      Rate and Rhythm: Normal rate and regular rhythm  Pulmonary:      Effort: Pulmonary effort is normal  No respiratory distress  Breath sounds: Normal breath sounds  Abdominal:      General: Bowel sounds are normal  There is no distension  Palpations: Abdomen is soft  Tenderness: There is no abdominal tenderness  There is no guarding  Musculoskeletal:      Right lower leg: No edema  Left lower leg: No edema  Skin:     General: Skin is warm and dry     Neurological:      Mental Status: He is alert and oriented to person, place, and time  Mental status is at baseline  Comments: Baseline facial droop        Discussion with Family: Updated  (son) at bedside  Discharge instructions/Information to patient and family:   See after visit summary for information provided to patient and family  Provisions for Follow-Up Care:  See after visit summary for information related to follow-up care and any pertinent home health orders  Disposition:   Home    Planned Readmission: No    Discharge Medications:  See after visit summary for reconciled discharge medications provided to patient and/or family        **Please Note: This note may have been constructed using a voice recognition system**

## 2023-01-26 NOTE — PHYSICAL THERAPY NOTE
01/26/23 1300   Note Type   Note type Screen   Additional Comments PT orders received and chart reviewed  Per Rod OT pt is at baseline level of mobility and is not in need of skilled PT services currently  This PT spoke with pt and his son about possible RW, both pt and son decline need for RW  Will DC IP PT services as pt is at baseline level of mobility and is ready for dc     Licensure   [place] License Number  Mukund Calderon [place], [place] [id number]

## 2023-01-26 NOTE — ASSESSMENT & PLAN NOTE
Lab Results   Component Value Date    HGBA1C 5 5 12/23/2022       Recent Labs     01/25/23  2227   POCGLU 127       Blood Sugar Average: Last 72 hrs:  (P) 127     Home Regimen: Lantus 15 units every morning, Humalog 4 units three times daily with meals    Plan:  • Hold oral antihyperglycemics  • Diet Consistent CHO Level 2 (5 CHO servings/75g CHO per meal)  • Insulin regimen  o Humalog 4 units TID AC  o Lantus 15 units Q AM  o Glucose checks and Insulin correction ACHS  • Goal -180 while admitted, adjusting insulin regimen as appropriate  • Monitor for hypoglycemia and treat per protocol

## 2023-01-26 NOTE — ASSESSMENT & PLAN NOTE
· Blood pressures stable this admission  · Continue carvedilol 12 5 mg BID and torsemide 50 mg daily  · Continue to monitor blood pressures

## 2023-01-26 NOTE — ASSESSMENT & PLAN NOTE
· Presented with one day of generalized weakness after dialysis session today  Patient's spouse and daughter report that the patient is normally not presenting this way following dialysis  He was so weak to the point that he was unable to ambulate  However, according to the patient's daughter he normally has days where he is able to and unable to ambulate at baseline  · He reports bilateral ear pain and a mild cough  Denies any fevers and chills and sick contacts  · Initial troponin level in the ED was found to be elevated to 136  BNP was found to be elevated 499 but is reduced from prior value of 737  Both findings not unexpected in the setting of ESRD  · EKG showed normal sinus rhythm with mild ST segment depressions in the anterior septal leads V3 and V4  Troponins: 136->122->119, decreasing   · Slight drop of hemoglobin from baseline (11 5 > 10 1), which is likely secondary to ESRD  He denies any melena, hematochezia, hematemesis, and hematuria and there is no evidence of a source of active bleeding  · COVID-19/Influenza/RSV negative  · CXR showed no evidence of infiltrates or consolidations  · Suspect possible upper respiratory viral illness causing elevated troponin secondary to demand ischemia  His bilateral ear pain could likely be due to a viral URI  On exam, patient does not have bulging tympanic membranes bilaterally and there is no appreciable erythema    · UA: pH 8 5, protein 2+, glucose 300, 3/10%, UA micro-negative  · TSH: 2 455, normal  · Other differentials include atypical presentation of ACS vs UTI vs less likely pneumonia    Plan:  · Up and OOB as tolerated  · Symptomatic management  · PT/OT evaluation- Pt declined walker

## 2023-01-26 NOTE — OCCUPATIONAL THERAPY NOTE
Occupational Therapy Evaluation     Patient Name: Edna Ratliff  EOUPQ'R Date: 1/26/2023  Problem List  Principal Problem:    Generalized weakness  Active Problems:    Type 2 diabetes mellitus with chronic kidney disease on chronic dialysis, with long-term current use of insulin ([facility] )    Mixed hyperlipidemia    Anemia    ESRD on dialysis [facility])    Primary hypertension    Past Medical History  Past Medical History:   Diagnosis Date    Cerebrovascular accident (CVA) due to thrombosis of left middle cerebral artery ([facility] ) 7/29/2018    Chronic kidney disease     Diabetes mellitus ([facility] )     GERD (gastroesophageal reflux disease)     Hypercholesteremia     Hyperlipidemia     Hypertension     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    Neuropathy     Obesity     Osteomyelitis ([place] [id number] )     last assessed 11/4/16    PVC's (premature ventricular contractions)     sees cardiology Dr Joaquina camargo    Stroke [facility])     last weeof July 2018 [address] [facility]    TIA (transient ischemic attack) 10/28/2018     Past Surgical History  Past Surgical History:   Procedure Laterality Date    ABDOMINAL SURGERY      CARDIAC CATHETERIZATION N/A 5/2/2022    Procedure: Cardiac Coronary Angiogram;  Surgeon: Nicolle Bliss MD;  [facility] C[facility]; Service: Cardiology    CARDIAC CATHETERIZATION N/A 5/2/2022    Procedure: Cardiac pci;  Surgeon: Nicolle Bliss MD;  [facility] CARD[facility]; Service: Cardiology    CHOLECYSTECTOMY      Percutaneous    COLONOSCOPY      CYSTOSCOPY      OTHER SURGICAL HISTORY      "stimulator to control bowel movements"    [place] ESOPHAGOGASTRODUODENOSCOPY TRANSORAL DIAGNOSTIC N/A 9/27/2016    Procedure: ESOPHAGOGASTRODUODENOSCOPY (EGD); Surgeon: Dwain Salvador MD;  Location: [facility] GI LAB;   Service: Gastroenterology    [place] LAPAROSCOPY SURG CHOLECYSTECTOMY N/A 2/29/2016    Procedure: LAPAROSCOPIC CHOLECYSTECTOMY ;  Surgeon: Diandra Conway DO;  Location: [facility] Main OR;  Service: General    ROTATOR CUFF REPAIR Right     TOE AMPUTATION Right 10/28/2016    Procedure: 3RD TOE AMPUTATION ;  Surgeon: Yesenia Schwarz DPM;  Location: [facility];  Service:            01/26/23 1144   OT Last Visit   OT Visit Date 01/26/23   Note Type   Note type Evaluation   Pain Assessment   Pain Assessment Tool 0-10   Pain Score No Pain   Restrictions/Precautions   Weight Bearing Precautions Per Order No   Other Precautions Chair Alarm; Bed Alarm;Limb alert  (lUE limb alert, james)   Home Living   Type of Home House  (multi-level 3 steps up/down)   Home Layout Multi-level; Two level   Bathroom Shower/Tub Walk-in shower  (on 1st floor)   Bathroom Toilet Standard   Bathroom Equipment Grab bars in shower   Bathroom Accessibility Accessible   Additional Comments No use of AD   Prior Function   Level of [place] Needs assistance with ADLs; Independent with functional mobility; Needs assistance with IADLS   Lives With Spouse   Receives Help From Family  (son lives down the street)   IADLs Family/Friend/Other provides transportation   Falls in the last 6 months 0   Vocational Retired   [address] [employer], pt living in [address] [place] [address] Ct, pt (A) with ADLs, and (A) with IADLs (-) falls, (-) drives, and no use of AD   Reciprocal Relationships supportive wife and family   Service to Others retired   Intrinsic Gratification likes to watch 71 news   Subjective   Subjective "Can I go home today"   ADL   Eating Assistance 7  Independent   Grooming Assistance 6  [address] 2  Maximal Assistance   LB [address] 2  Maximal [address] 5  [address] up over hips   [address]  4  Minimal Assistance   Additional Comments While unable to assess eating, grooming, UB bathing, LB bathing, UB dressing and toileting at time of evaluation, with use of clinical reasoning, pt's performance throughout evaluation indicates that pt may be able to perform these tasks at the levels listed above   Bed Mobility   Additional Comments unable to assess, pt was in beside chair at start of eval   Transfers   Sit to Stand 5  Supervision   Additional items Increased time required;Assist x 1   Stand to Sit 5  Supervision   Additional items Assist x 1; Impulsive   Additional Comments no use of AD   Functional Mobility   Functional Mobility 5  Supervision   Additional Comments pt completed household functional distances from chair>< to elevators to get newspaper  Additional items   (no use of AD)   Balance   Static Sitting Normal   Static Standing Good   Ambulatory Fair -   Activity Tolerance   Activity Tolerance Patient tolerated treatment well   Medical Staff Made Aware THIERNO MCDANIEL Assessment   RUE Assessment WFL  (observed during ADLs)   LUE Assessment   LUE Assessment WFL   Hand Function   Gross Motor Coordination Functional   Fine Motor Coordination Functional   Vision-Basic Assessment   Current Vision Wears glasses all the time   Cognition   Overall Cognitive Status Impaired   Arousal/Participation Alert; Cooperative   Attention Attends with cues to redirect   Orientation Level Oriented to person;Oriented to place;Oriented to situation;Disoriented to time   Memory Decreased short term memory;Decreased recall of recent events   Following Commands Follows one step commands with increased time or repetition   Comments pt identified via name and   pt was pleasant and alert during eval session, but labile and tearful at times  pt answered questions appropriately  pt required vc to re-direct at times  at end of eval, pt was in bedside chair with all needs within reach, call bell in hand, and bed/chair alarm activated     Assessment   Limitation Decreased endurance   Prognosis Good   Assessment Pt is a 58 y o  male seen for OT evaluation s/p admission to [address] [place] [address] on 2023 due to weakness, bilateral ear pain, dry cough, and difficulty ambulating after dialysis    Pt diagnosed with Generalized weakness  Pt has a significant PMH impacting occupational performance including: CVA, chronic kidney disease (on hemodialysis), depression, MI, anemia, type II Diabetes, hypertension, and hyperlipidemia  Pt with 3 recent admissions in the last 2 months  PTA, pt living in [facility], pt (A) with ADLs, and (A) with IADLs (-) falls, (-) drives, and no use of AD  Pt is motivated to return to home    Personal and environmental factors supporting pt at time of IE include age and social support  Personal and environmental factors inhibiting engagement in occupations include lifestyle patterns and inaccessible home environment  During evaluation pt performed as is outlined above in flowsheet  Pt required VC for safety and VC for attention to task  Standardized assessments used to assist in identifying performance deficits include AMPAC 6-Clicks and Barthel ADL Index  Performance deficits that affect the pt’s occupational performance during the initial evaluation include impaired balance, attention to task, safety awareness and pacing of tasks, use of coping skills  Victor Manuel Sharma Upon discharge from acute care setting recommend d/c to Home with family support     Goals   Patient Goals to go home   Plan   OT Treatment Day 0   OT Frequency Eval only   Recommendation   OT Discharge Recommendation No rehabilitation needs   AM-PAC Daily Activity Inpatient   Lower Body Dressing 3   Bathing 2   Toileting 4   Upper Body Dressing 3   Grooming 4   Eating 4   Daily Activity Raw Score 20   Daily Activity Standardized Score (Calc for Raw Score >=11) 42 03   AM-PAC Applied Cognition Inpatient   Following a Speech/Presentation 2   Understanding Ordinary Conversation 3   Taking Medications 2   Remembering Where Things Are Placed or Put Away 2   Remembering List of 4-5 Errands 2   Taking Care of Complicated Tasks 2   Applied Cognition Raw Score 13   Applied Cognition Standardized Score 30 46   Barthel Index   Feeding 10   Bathing 0   Grooming Score 5   Dressing Score 10   Bladder Score 10   Bowels Score 10   Toilet Use Score 10   Transfers (Bed/Chair) Score 15   Mobility (Level Surface) Score 15   Stairs Score 0   Barthel Index Score 85   End of Consult   Patient Position at End of Consult Bedside chair;Bed/Chair alarm activated; All needs within reach     The patient's raw score on the AM-PAC Daily Activity inpatient short form is 21, standardized score is 44 27, greater than 39 4  Patients at this level are likely to benefit from discharge to home  Please refer to the recommendation of the Occupational Therapist for safe discharge planning  No further acute OT needs identified at this time  Recommend continued active ADL participation and mobilization with hospital staff while in the hospital to increase pt’s endurance and strength upon D/C  From OT standpoint, recommend D/C to home with family support when medically cleared  D/C pt from OT caseload at this time       Analisa Rangele, OTS

## 2023-01-26 NOTE — ASSESSMENT & PLAN NOTE
Lab Results   Component Value Date    EGFR 13 01/25/2023    EGFR 6 01/14/2023    EGFR 7 01/09/2023    CREATININE 4 50 (H) 01/25/2023    CREATININE 8 37 (H) 01/14/2023    CREATININE 7 43 (H) 01/09/2023     · Patient receives dialysis on a Monday, Wednesday, and Friday schedule    Per patient's spouse and daughter, patient was significantly more weak following dialysis today, which is atypical     Plan:  · Stable  · Monitor daily weights and I/Os  · Avoid hypotension and nephrotoxins  · Continue torsemide 50 mg daily  · Inpatient consult to nephrology for potential inpatient HD

## 2023-01-26 NOTE — ASSESSMENT & PLAN NOTE
· Blood pressures stable this admission  · Continue carvedilol 12 5 mg BID and torsemide 50 mg daily

## 2023-01-26 NOTE — ASSESSMENT & PLAN NOTE
· Presented with one day of generalized weakness after dialysis session today  Patient's spouse and daughter report that the patient is normally not presenting this way following dialysis  He was so weak to the point that he was unable to ambulate  However, according to the patient's daughter he normally has days where he is able to and unable to ambulate at baseline  · He reports bilateral ear pain and a mild cough  Denies any fevers and chills and sick contacts  · Initial troponin level in the ED was found to be elevated to 136  BNP was found to be elevated 499 but is reduced from prior value of 737  Both findings not unexpected in the setting of ESRD  · EKG showed normal sinus rhythm with mild ST segment depressions in the anterior septal leads V3 and V4  · Slight drop of hemoglobin from baseline (11 5 > 10 1), which is likely secondary to ESRD  He denies any melena, hematochezia, hematemesis, and hematuria and there is no evidence of a source of active bleeding  · COVID-19/Influenza/RSV negative on 1/14  · CXR showed no evidence of infiltrates or consolidations  · Suspect possible upper respiratory viral illness causing elevated troponin secondary to demand ischemia  His bilateral ear pain could likely be due to a viral URI  On exam, patient does not have bulging tympanic membranes bilaterally and there is no appreciable erythema    · Other differentials include atypical presentation of ACS vs UTI vs less likely pneumonia    Plan:  · Trend serial troponins  · Follow-up with UA  · Follow-up with COVID-19/Influenza/RSV panel  · Follow-up with TSH  · Up and OOB as tolerated  · Symptomatic management  · PT/OT evaluation

## 2023-01-26 NOTE — ASSESSMENT & PLAN NOTE
· POA hemoglobin 10 1 (baseline 11 5)  · Suspect acute drop in hemoglobin secondary to normal variation in the setting of ESRD  · No active source or signs of bleeding at this time    Recent Labs     01/25/23 1958 01/26/23  0453   HGB 10 1* 10 1*       Plan:  · Stable

## 2023-01-26 NOTE — ASSESSMENT & PLAN NOTE
Lab Results   Component Value Date    HGBA1C 5 5 12/23/2022       Recent Labs     01/25/23  2227   POCGLU 127       Blood Sugar Average: Last 72 hrs:  (P) 127     Home Regimen: Lantus 15 units every morning, Humalog 4 units three times daily with meals    Plan:  Resume home regimen

## 2023-01-26 NOTE — ASSESSMENT & PLAN NOTE
· POA hemoglobin 10 1 (baseline 11 5)  · Suspect acute drop in hemoglobin secondary to normal variation in the setting of ESRD  · No active source or signs of bleeding at this time      Plan:  · Monitor CBC in the a m   · Monitor bowel movements and for active signs of bleeding

## 2023-01-26 NOTE — H&P
[facility]  H&P- Irma Williamson 1960, 58 y o  male MRN: [id number]  Unit/Bed#: ED-14 Encounter: [id number]  Primary Care Provider: Sam Perera DO   Date and time admitted to hospital: 1/25/2023  7:09 PM    * Generalized weakness  Assessment & Plan  · Presented with one day of generalized weakness after dialysis session today  Patient's spouse and daughter report that the patient is normally not presenting this way following dialysis  He was so weak to the point that he was unable to ambulate  However, according to the patient's daughter he normally has days where he is able to and unable to ambulate at baseline  · He reports bilateral ear pain and a mild cough  Denies any fevers and chills and sick contacts  · Initial troponin level in the ED was found to be elevated to 136  BNP was found to be elevated 499 but is reduced from prior value of 737  Both findings not unexpected in the setting of ESRD  · EKG showed normal sinus rhythm with mild ST segment depressions in the anterior septal leads V3 and V4  · Slight drop of hemoglobin from baseline (11 5 > 10 1), which is likely secondary to ESRD  He denies any melena, hematochezia, hematemesis, and hematuria and there is no evidence of a source of active bleeding  · COVID-19/Influenza/RSV negative on 1/14  · CXR showed no evidence of infiltrates or consolidations  · Suspect possible upper respiratory viral illness causing elevated troponin secondary to demand ischemia  His bilateral ear pain could likely be due to a viral URI  On exam, patient does not have bulging tympanic membranes bilaterally and there is no appreciable erythema    · Other differentials include atypical presentation of ACS vs UTI vs less likely pneumonia    Plan:  · Trend serial troponins  · Follow-up with UA  · Follow-up with COVID-19/Influenza/RSV panel  · Follow-up with TSH  · Up and OOB as tolerated  · Symptomatic management  · PT/OT evaluation    ESRD on dialysis [facility])  Assessment & Plan  Lab Results   Component Value Date    EGFR 13 01/25/2023    EGFR 6 01/14/2023    EGFR 7 01/09/2023    CREATININE 4 50 (H) 01/25/2023    CREATININE 8 37 (H) 01/14/2023    CREATININE 7 43 (H) 01/09/2023     · Patient receives dialysis on a Monday, Wednesday, and Friday schedule  Per patient's spouse and daughter, patient was significantly more weak following dialysis today, which is atypical     Plan:  · Stable  · Monitor daily weights and I/Os  · Avoid hypotension and nephrotoxins  · Continue torsemide 50 mg daily  · Inpatient consult to nephrology for potential inpatient HD      Primary hypertension  Assessment & Plan  · Blood pressures stable this admission  · Continue carvedilol 12 5 mg BID and torsemide 50 mg daily  · Continue to monitor blood pressures    Anemia  Assessment & Plan  · POA hemoglobin 10 1 (baseline 11 5)  · Suspect acute drop in hemoglobin secondary to normal variation in the setting of ESRD  · No active source or signs of bleeding at this time      Plan:  · Monitor CBC in the a m   · Monitor bowel movements and for active signs of bleeding    Mixed hyperlipidemia  Assessment & Plan  · Continue atorvastatin 40 mg daily    Type 2 diabetes mellitus with chronic kidney disease on chronic dialysis, with long-term current use of insulin [facility])  Assessment & Plan  Lab Results   Component Value Date    HGBA1C 5 5 12/23/2022       Recent Labs     01/25/23  2227   POCGLU 127       Blood Sugar Average: Last 72 hrs:  (P) 127     Home Regimen: Lantus 15 units every morning, Humalog 4 units three times daily with meals    Plan:  • Hold oral antihyperglycemics  • Diet Consistent CHO Level 2 (5 CHO servings/75g CHO per meal)  • Insulin regimen  o Humalog 4 units TID AC  o Lantus 15 units Q AM  o Glucose checks and Insulin correction ACHS  • Goal -180 while admitted, adjusting insulin regimen as appropriate  • Monitor for hypoglycemia and treat per protocol      VTE Pharmacologic Prophylaxis: VTE Score: 4 Moderate Risk (Score 3-4) - Pharmacological DVT Prophylaxis Ordered: heparin  Code Status: Level 1 - Full Code   Discussion with family: Updated  (daughter) at bedside  Anticipated Length of Stay: Patient will be admitted on an observation basis with an anticipated length of stay of less than 2 midnights secondary to generalized weakness  Chief Complaint: Generalized weakness    History of Present Illness:  Patricio Urbina is a 58 y o  male with a PMH of ESRD on dialysis, dementia, CVA in 2018, type 2 diabetes mellitus, hypertension, hyperlipidemia, and GERD who presents with a chief complaint of generalized weakness  Per patient's spouse and daughter, patient started experiencing significant weakness following his dialysis session today  He was so weak to the point where he was unable to ambulate on his own  He has never presented this way following hemodialysis sessions in the past, so he was brought to the emergency department for further evaluation  Patient's daughter did report that at baseline he has days where he is able to ambulate and not able to ambulate  Patient's spouse reports that the patient did have chest pain yesterday that was described as persistent  The spouse initially thought the chest pain was due to reflux  In the ED, patient denies any present chest pain, shortness of breath, fever, chills, dysuria, hematuria, urinary frequency, foul-smelling urine, nausea, vomiting, abdominal pain, melena, hematochezia, and hemoptysis  He denies any sick contacts  He does endorse bilateral ear pain that started 3 days ago and a slight nonproductive cough  He was tested for COVID-19, influenza, and RSV on 1/14, which was negative  Review of Systems:  Review of Systems   Constitutional: Negative for chills and fever  HENT: Positive for ear pain  Negative for ear discharge and rhinorrhea      Eyes: Negative for discharge and visual disturbance  Respiratory: Positive for cough  Negative for shortness of breath  Cardiovascular: Positive for chest pain  Negative for palpitations  Gastrointestinal: Negative for abdominal pain, blood in stool, diarrhea, nausea and vomiting  Endocrine: Negative for polyuria  Genitourinary: Positive for frequency  Negative for dysuria and hematuria  Musculoskeletal: Negative for arthralgias and myalgias  Skin: Negative for color change and rash  Neurological: Positive for weakness  Negative for dizziness and light-headedness  Psychiatric/Behavioral: Negative for agitation and confusion  Past Medical and Surgical History:   Past Medical History:   Diagnosis Date   • Cerebrovascular accident (CVA) due to thrombosis of left middle cerebral artery ([facility] ) 7/29/2018   • Chronic kidney disease    • Diabetes mellitus ([place] [address] )    • GERD (gastroesophageal reflux disease)    • Hypercholesteremia    • Hyperlipidemia    • Hypertension    • 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   • Neuropathy    • Obesity    • Osteomyelitis ([facility] )     last assessed 11/4/16   • PVC's (premature ventricular contractions)     sees cardiology Dr Luis Eduardo camargo   • Stroke [facility])     last weeof July 2018 [address] [facility]   • TIA (transient ischemic attack) 10/28/2018       Past Surgical History:   Procedure Laterality Date   • ABDOMINAL SURGERY     • CARDIAC CATHETERIZATION N/A 5/2/2022    Procedure: Cardiac Coronary Angiogram;  Surgeon: Estiven Spence MD;  [facility] CARD[facility]AC [facility]; Service: Cardiology   • CARDIAC CATHETERIZATION N/A 5/2/2022    Procedure: Cardiac pci;  Surgeon: Estiven Spence MD;  [facility] CARDIAC [facility]; Service: Cardiology   • CHOLECYSTECTOMY      Percutaneous   • COLONOSCOPY     • CYSTOSCOPY     • OTHER SURGICAL HISTORY      "stimulator to control bowel movements"   • FL ESOPHAGOGASTRODUODENOSCOPY TRANSORAL DIAGNOSTIC N/A 9/27/2016    Procedure: ESOPHAGOGASTRODUODENOSCOPY (EGD);   Surgeon: Abimbola Emery MD;  Location: [facility] GI LAB; Service: Gastroenterology   • MA LAPAROSCOPY SURG CHOLECYSTECTOMY N/A 2/29/2016    Procedure: LAPAROSCOPIC CHOLECYSTECTOMY ;  Surgeon: Nahed Doss DO;  Location: [facility] Main OR;  Service: General   • ROTATOR CUFF REPAIR Right    • TOE AMPUTATION Right 10/28/2016    Procedure: 3RD TOE AMPUTATION ;  Surgeon: Queenie Stewart DPM;  Location: [facility] Main OR;  Service:        Meds/Allergies:  Prior to Admission medications    Medication Sig Start Date End Date Taking? Authorizing Provider   aspirin (ECOTRIN LOW STRENGTH) 81 mg EC tablet Take 81 mg by mouth daily Resume on 8/14      Historical Provider, MD   atorvastatin (LIPITOR) 40 mg tablet TAKE 1 TABLET BY MOUTH EVERY DAY WITH DINNER 12/16/22   Raj Auguste DO   benzonatate (TESSALON PERLES) 100 mg capsule Take 1 capsule (100 mg total) by mouth 3 (three) times a day as needed for cough  Patient not taking: Reported on 11/2/2022 10/21/22   Chanelle Martinez MD   Blood Glucose Monitoring Suppl (True Metrix Meter) w/Device KIT Use to test blood sugars 3 times daily 7/1/22   Rossy Yu MD   Blood Pressure Monitoring (BLOOD PRESSURE CUFF) MISC Use to check blood pressure before taking blood pressure medication and 1 hour after and follow instructions provided in discharge instructions based on the readings   8/13/18   Lionel Johnson MD   calcium acetate (CALPHRON) 667 mg 3 tablets 3x per day 10/17/21   Historical Provider, MD   carvedilol (COREG) 12 5 mg tablet Take 1 tablet (12 5 mg total) by mouth 2 (two) times a day with meals 10/13/22 1/5/23  Cornell Gilmore MD   Cholecalciferol (Vitamin D3) 1 25 MG (75376 UT) CAPS TAKE 1 CAPSULE BY MOUTH ONE TIME PER WEEK 6/3/22   Sedrick Cruz MD   divalproex sodium (DEPAKOTE SPRINKLE) 125 MG capsule Take 2 capsules (250 mg total) by mouth every 12 (twelve) hours 1/25/23   Raj Auguste DO   glucose blood (True Metrix Blood Glucose Test) test strip Use 1 each 3 (three) times a day Use as instructed 7/1/22   Piero Roach MD   insulin glargine (Lantus) 100 units/mL subcutaneous injection Inject 14 Units under the skin daily 12/14/22   Piero Roach MD   insulin lispro (HumaLOG KwikPen) 100 units/mL injection pen INJECT 4 UNITS 3 TIMES A DAY WITH MEALS PLUS SCALE (max daily dose 42 units) 12/14/22   Piero Roach MD   Insulin Pen Needle (BD Pen Needle Adina U/F) 32G X 4 MM MISC Use 4 times daily 12/14/22   Piero Roach MD   Insulin Syringe-Needle U-100 (B-D INS SYRINGE 0 5CC/30GX1/2") 30G X 1/2" 0 5 ML MISC Inject once daily 12/14/22   Piero Roach MD   Lancets Ultra Thin 30G MISC Use 3 times a day 3/28/22   Lissette Brennan PA-C   Methoxy PEG-Epoetin Beta 30 MCG/0 3ML SOSY 30 mcg  Patient not taking: Reported on 1/3/2023 5/16/22 5/15/23  Historical Provider, MD Merritt Saturiel LANCETS 63T [address] [facility] by Does not apply route 3 (three) times a day 11/27/18   Raj Auguste DO   prasugrel (EFFIENT) tablet Take 1 tablet by mouth daily 8/2/22   Historical Provider, MD   torsemide (DEMADEX) 100 mg tablet Take 0 5 tablets (50 mg total) by mouth daily As directed by your nephrologist 1/13/23   Raj Coleman DO   divalproex sodium (DEPAKOTE SPRINKLE) 125 MG capsule Take 1 capsule (125 mg total) by mouth every 12 (twelve) hours 1/13/23 1/25/23  Raj Coleman DO     I have reviewed home medications with patient family member      Allergies: No Known Allergies    Social History:  Marital Status: /Civil Union   Occupation: N/A  Patient Pre-hospital Living Situation: Home  Patient Pre-hospital Level of Mobility: walks  Patient Pre-hospital Diet Restrictions: N/A  Substance Use History:   Social History     Substance and Sexual Activity   Alcohol Use Not Currently     Social History     Tobacco Use   Smoking Status Never   Smokeless Tobacco Never     Social History     Substance and Sexual Activity   Drug Use No       Family History:  Family History   Problem Relation Age of Onset   • Leukemia Mother    • Liver disease Mother    • Lung cancer Mother         heavy smoker - 3 ppd   • Heart disease Father    • Liver disease Father    • Multiple myeloma Sister    • Breast cancer Sister    • Urolithiasis Family    • Alcohol abuse Neg Hx    • Depression Neg Hx    • Drug abuse Neg Hx    • Substance Abuse Neg Hx    • Mental illness Neg Hx        Physical Exam:     Vitals:   Blood Pressure: 120/59 (01/25/23 2145)  Pulse: 67 (01/25/23 2145)  Temperature: 98 1 °F (36 7 °C) (01/25/23 1914)  Temp Source: Oral (01/25/23 1914)  Respirations: 18 (01/25/23 2145)  Weight - Scale: 103 kg (227 lb 1 2 oz) (01/25/23 1912)  SpO2: 96 % (01/25/23 2145)    Physical Exam  Constitutional:       General: He is not in acute distress  Appearance: Normal appearance  HENT:      Head: Normocephalic and atraumatic  Right Ear: External ear normal       Left Ear: External ear normal       Nose: Nose normal       Mouth/Throat:      Mouth: Mucous membranes are moist    Eyes:      Extraocular Movements: Extraocular movements intact  Conjunctiva/sclera: Conjunctivae normal    Cardiovascular:      Rate and Rhythm: Normal rate and regular rhythm  Pulses: Normal pulses  Heart sounds: Normal heart sounds, S1 normal and S2 normal  No murmur heard  Pulmonary:      Effort: Pulmonary effort is normal  No respiratory distress  Breath sounds: Normal breath sounds  No decreased breath sounds, wheezing, rhonchi or rales  Abdominal:      General: Abdomen is protuberant  Bowel sounds are normal  There is distension  Palpations: Abdomen is soft  Tenderness: There is no abdominal tenderness  There is no guarding or rebound  Comments: Tympanic to percussion of right upper quadrant   Musculoskeletal:         General: No tenderness  Cervical back: Neck supple  Right lower leg: No edema  Left lower leg: No edema  Skin:     General: Skin is warm and dry     Neurological:      General: No focal deficit present  Mental Status: He is lethargic  Sensory: Sensation is intact  Comments: More somnolent at the time of my evaluation and would drift off to sleep every few seconds   Psychiatric:         Behavior: Behavior normal           Additional Data:     Lab Results:  Results from last 7 days   Lab Units 01/25/23 1958   WBC Thousand/uL 5 62   HEMOGLOBIN g/dL 10 1*   HEMATOCRIT % 31 5*   PLATELETS Thousands/uL 129*   NEUTROS PCT % 68   LYMPHS PCT % 16   MONOS PCT % 13*   EOS PCT % 1     Results from last 7 days   Lab Units 01/25/23 1958   SODIUM mmol/L 136   POTASSIUM mmol/L 3 6   CHLORIDE mmol/L 93*   CO2 mmol/L 33*   BUN mg/dL 23   CREATININE mg/dL 4 50*   ANION GAP mmol/L 10   CALCIUM mg/dL 9 4   ALBUMIN g/dL 4 3   TOTAL BILIRUBIN mg/dL 0 64   ALK PHOS U/L 81   ALT U/L 10   AST U/L 11*   GLUCOSE RANDOM mg/dL 145*         Results from last 7 days   Lab Units 01/25/23  2227   POC GLUCOSE mg/dl 127               Lines/Drains:  Invasive Devices     Peripheral Intravenous Line  Duration           Peripheral IV 01/25/23 Right;Ventral (anterior) Forearm <1 day          Line  Duration           Hemodialysis AV Fistula Left Upper arm -- days                    Imaging: Reviewed radiology reports from this admission including: chest xray  XR chest 1 view portable   ED Interpretation by Christoper Boeck, DO (01/25 2130)   Mild pulmonary congestion, no signs of atelectasis or infiltrates  EKG and Other Studies Reviewed on Admission:   · EKG: NSR  HR 76     ** Please Note: This note has been constructed using a voice recognition system   **

## 2023-01-27 ENCOUNTER — PATIENT MESSAGE (OUTPATIENT)
Dept: ENDOCRINOLOGY | Facility: CLINIC | Age: 63
End: 2023-01-27

## 2023-01-27 ENCOUNTER — TRANSITIONAL CARE MANAGEMENT (OUTPATIENT)
Dept: INTERNAL MEDICINE CLINIC | Facility: CLINIC | Age: 63
End: 2023-01-27

## 2023-01-27 VITALS
RESPIRATION RATE: 18 BRPM | OXYGEN SATURATION: 96 % | WEIGHT: 221 LBS | SYSTOLIC BLOOD PRESSURE: 166 MMHG | TEMPERATURE: 98.5 F | BODY MASS INDEX: 32.64 KG/M2 | DIASTOLIC BLOOD PRESSURE: 90 MMHG | HEART RATE: 77 BPM

## 2023-01-28 LAB
ATRIAL RATE: 76 BPM
P AXIS: 43 DEGREES
PR INTERVAL: 138 MS
QRS AXIS: -40 DEGREES
QRSD INTERVAL: 166 MS
QT INTERVAL: 470 MS
QTC INTERVAL: 528 MS
T WAVE AXIS: 75 DEGREES
VENTRICULAR RATE: 76 BPM

## 2023-01-29 ENCOUNTER — APPOINTMENT (INPATIENT)
Dept: DIALYSIS | Facility: HOSPITAL | Age: 63
End: 2023-01-29
Attending: STUDENT IN AN ORGANIZED HEALTH CARE EDUCATION/TRAINING PROGRAM

## 2023-01-29 ENCOUNTER — APPOINTMENT (EMERGENCY)
Dept: RADIOLOGY | Facility: HOSPITAL | Age: 63
End: 2023-01-29

## 2023-01-29 ENCOUNTER — HOSPITAL ENCOUNTER (INPATIENT)
Facility: HOSPITAL | Age: 63
LOS: 3 days | End: 2023-02-01
Attending: EMERGENCY MEDICINE | Admitting: INTERNAL MEDICINE

## 2023-01-29 DIAGNOSIS — E87.70 VOLUME OVERLOAD: ICD-10-CM

## 2023-01-29 DIAGNOSIS — R45.86 EMOTIONAL LABILITY: ICD-10-CM

## 2023-01-29 DIAGNOSIS — D64.9 ANEMIA: ICD-10-CM

## 2023-01-29 DIAGNOSIS — N18.6 TYPE 2 DIABETES MELLITUS WITH CHRONIC KIDNEY DISEASE ON CHRONIC DIALYSIS, WITH LONG-TERM CURRENT USE OF INSULIN (HCC): ICD-10-CM

## 2023-01-29 DIAGNOSIS — I25.10 CORONARY ARTERY DISEASE INVOLVING NATIVE CORONARY ARTERY OF NATIVE HEART WITHOUT ANGINA PECTORIS: ICD-10-CM

## 2023-01-29 DIAGNOSIS — R77.8 ELEVATED TROPONIN: ICD-10-CM

## 2023-01-29 DIAGNOSIS — R06.02 SHORTNESS OF BREATH: ICD-10-CM

## 2023-01-29 DIAGNOSIS — J81.0 ACUTE PULMONARY EDEMA (HCC): ICD-10-CM

## 2023-01-29 DIAGNOSIS — Z99.2 TYPE 2 DIABETES MELLITUS WITH CHRONIC KIDNEY DISEASE ON CHRONIC DIALYSIS, WITH LONG-TERM CURRENT USE OF INSULIN (HCC): ICD-10-CM

## 2023-01-29 DIAGNOSIS — Z86.73 HISTORY OF STROKE: ICD-10-CM

## 2023-01-29 DIAGNOSIS — Z99.2 ESRD ON DIALYSIS (HCC): Primary | ICD-10-CM

## 2023-01-29 DIAGNOSIS — E11.22 TYPE 2 DIABETES MELLITUS WITH CHRONIC KIDNEY DISEASE ON CHRONIC DIALYSIS, WITH LONG-TERM CURRENT USE OF INSULIN (HCC): ICD-10-CM

## 2023-01-29 DIAGNOSIS — Z79.4 TYPE 2 DIABETES MELLITUS WITH CHRONIC KIDNEY DISEASE ON CHRONIC DIALYSIS, WITH LONG-TERM CURRENT USE OF INSULIN (HCC): ICD-10-CM

## 2023-01-29 DIAGNOSIS — N18.6 ESRD ON DIALYSIS (HCC): Primary | ICD-10-CM

## 2023-01-29 LAB
2HR DELTA HS TROPONIN: 3101 NG/L
4HR DELTA HS TROPONIN: 7925 NG/L
ALBUMIN SERPL BCP-MCNC: 2.7 G/DL (ref 3.5–5)
ALP SERPL-CCNC: 51 U/L (ref 34–104)
ALT SERPL W P-5'-P-CCNC: 7 U/L (ref 7–52)
AMMONIA PLAS-SCNC: 23 UMOL/L (ref 18–72)
ANION GAP SERPL CALCULATED.3IONS-SCNC: 7 MMOL/L (ref 4–13)
APAP SERPL-MCNC: <10 UG/ML (ref 10–20)
APTT PPP: 28 SECONDS (ref 23–37)
AST SERPL W P-5'-P-CCNC: 13 U/L (ref 13–39)
BASOPHILS # BLD AUTO: 0.04 THOUSANDS/ÂΜL (ref 0–0.1)
BASOPHILS NFR BLD AUTO: 0 % (ref 0–1)
BILIRUB SERPL-MCNC: 0.44 MG/DL (ref 0.2–1)
BNP SERPL-MCNC: 1572 PG/ML (ref 0–100)
BUN SERPL-MCNC: 36 MG/DL (ref 5–25)
CALCIUM ALBUM COR SERPL-MCNC: 7 MG/DL (ref 8.3–10.1)
CALCIUM SERPL-MCNC: 6 MG/DL (ref 8.4–10.2)
CARDIAC TROPONIN I PNL SERPL HS: 4037 NG/L
CARDIAC TROPONIN I PNL SERPL HS: 7138 NG/L
CARDIAC TROPONIN I PNL SERPL HS: ABNORMAL NG/L
CARDIAC TROPONIN I PNL SERPL HS: ABNORMAL NG/L (ref 8–18)
CHLORIDE SERPL-SCNC: 114 MMOL/L (ref 96–108)
CO2 SERPL-SCNC: 20 MMOL/L (ref 21–32)
CREAT SERPL-MCNC: 5.4 MG/DL (ref 0.6–1.3)
CRP SERPL QL: 123.2 MG/L
D DIMER PPP FEU-MCNC: 1.64 UG/ML FEU
EOSINOPHIL # BLD AUTO: 0.09 THOUSAND/ÂΜL (ref 0–0.61)
EOSINOPHIL NFR BLD AUTO: 1 % (ref 0–6)
ERYTHROCYTE [DISTWIDTH] IN BLOOD BY AUTOMATED COUNT: 15.2 % (ref 11.6–15.1)
ERYTHROCYTE [SEDIMENTATION RATE] IN BLOOD: 61 MM/HOUR (ref 0–19)
ETHANOL SERPL-MCNC: <10 MG/DL
FLUAV RNA RESP QL NAA+PROBE: NEGATIVE
FLUBV RNA RESP QL NAA+PROBE: NEGATIVE
GFR SERPL CREATININE-BSD FRML MDRD: 10 ML/MIN/1.73SQ M
GLUCOSE SERPL-MCNC: 114 MG/DL (ref 65–140)
GLUCOSE SERPL-MCNC: 122 MG/DL (ref 65–140)
GLUCOSE SERPL-MCNC: 139 MG/DL (ref 65–140)
GLUCOSE SERPL-MCNC: 152 MG/DL (ref 65–140)
GLUCOSE SERPL-MCNC: 168 MG/DL (ref 65–140)
HCT VFR BLD AUTO: 35.6 % (ref 36.5–49.3)
HGB BLD-MCNC: 10.9 G/DL (ref 12–17)
IMM GRANULOCYTES # BLD AUTO: 0.09 THOUSAND/UL (ref 0–0.2)
IMM GRANULOCYTES NFR BLD AUTO: 1 % (ref 0–2)
INR PPP: 1.03 (ref 0.84–1.19)
LACTATE SERPL-SCNC: 1.1 MMOL/L (ref 0.5–2)
LACTATE SERPL-SCNC: 1.2 MMOL/L (ref 0.5–2)
LIPASE SERPL-CCNC: 12 U/L (ref 11–82)
LYMPHOCYTES # BLD AUTO: 1.34 THOUSANDS/ÂΜL (ref 0.6–4.47)
LYMPHOCYTES NFR BLD AUTO: 11 % (ref 14–44)
MAGNESIUM SERPL-MCNC: 1.5 MG/DL (ref 1.9–2.7)
MCH RBC QN AUTO: 29.9 PG (ref 26.8–34.3)
MCHC RBC AUTO-ENTMCNC: 30.6 G/DL (ref 31.4–37.4)
MCV RBC AUTO: 98 FL (ref 82–98)
MONOCYTES # BLD AUTO: 1.06 THOUSAND/ÂΜL (ref 0.17–1.22)
MONOCYTES NFR BLD AUTO: 9 % (ref 4–12)
NEUTROPHILS # BLD AUTO: 9.21 THOUSANDS/ÂΜL (ref 1.85–7.62)
NEUTS SEG NFR BLD AUTO: 78 % (ref 43–75)
NRBC BLD AUTO-RTO: 0 /100 WBCS
PHOSPHATE SERPL-MCNC: 3 MG/DL (ref 2.3–4.1)
PLATELET # BLD AUTO: 172 THOUSANDS/UL (ref 149–390)
PLATELET # BLD AUTO: 181 THOUSANDS/UL (ref 149–390)
PMV BLD AUTO: 10.3 FL (ref 8.9–12.7)
PMV BLD AUTO: 9.8 FL (ref 8.9–12.7)
POTASSIUM SERPL-SCNC: 3.5 MMOL/L (ref 3.5–5.3)
PROCALCITONIN SERPL-MCNC: 0.31 NG/ML
PROT SERPL-MCNC: 4.7 G/DL (ref 6.4–8.4)
PROTHROMBIN TIME: 13.7 SECONDS (ref 11.6–14.5)
RBC # BLD AUTO: 3.64 MILLION/UL (ref 3.88–5.62)
RSV RNA RESP QL NAA+PROBE: NEGATIVE
SALICYLATES SERPL-MCNC: <5 MG/DL (ref 3–20)
SARS-COV-2 RNA RESP QL NAA+PROBE: NEGATIVE
SODIUM SERPL-SCNC: 141 MMOL/L (ref 135–147)
TSH SERPL DL<=0.05 MIU/L-ACNC: 1.97 UIU/ML (ref 0.45–4.5)
VALPROATE SERPL-MCNC: 10 UG/ML (ref 50–100)
WBC # BLD AUTO: 11.83 THOUSAND/UL (ref 4.31–10.16)

## 2023-01-29 RX ORDER — HEPARIN SODIUM 5000 [USP'U]/ML
5000 INJECTION, SOLUTION INTRAVENOUS; SUBCUTANEOUS EVERY 8 HOURS SCHEDULED
Status: DISCONTINUED | OUTPATIENT
Start: 2023-01-29 | End: 2023-01-29

## 2023-01-29 RX ORDER — HEPARIN SODIUM 1000 [USP'U]/ML
4000 INJECTION, SOLUTION INTRAVENOUS; SUBCUTANEOUS EVERY 6 HOURS PRN
Status: DISCONTINUED | OUTPATIENT
Start: 2023-01-29 | End: 2023-02-01 | Stop reason: HOSPADM

## 2023-01-29 RX ORDER — INSULIN GLARGINE 100 [IU]/ML
14 INJECTION, SOLUTION SUBCUTANEOUS DAILY
Status: DISCONTINUED | OUTPATIENT
Start: 2023-01-29 | End: 2023-01-30

## 2023-01-29 RX ORDER — LORAZEPAM 2 MG/ML
0.5 INJECTION INTRAMUSCULAR ONCE
Status: COMPLETED | OUTPATIENT
Start: 2023-01-29 | End: 2023-01-29

## 2023-01-29 RX ORDER — INSULIN LISPRO 100 [IU]/ML
1-5 INJECTION, SOLUTION INTRAVENOUS; SUBCUTANEOUS
Status: DISCONTINUED | OUTPATIENT
Start: 2023-01-29 | End: 2023-01-30

## 2023-01-29 RX ORDER — CALCIUM ACETATE 667 MG/1
667 CAPSULE ORAL
Status: DISCONTINUED | OUTPATIENT
Start: 2023-01-29 | End: 2023-02-01 | Stop reason: HOSPADM

## 2023-01-29 RX ORDER — ONDANSETRON 2 MG/ML
4 INJECTION INTRAMUSCULAR; INTRAVENOUS ONCE
Status: COMPLETED | OUTPATIENT
Start: 2023-01-29 | End: 2023-01-29

## 2023-01-29 RX ORDER — MAGNESIUM SULFATE HEPTAHYDRATE 40 MG/ML
2 INJECTION, SOLUTION INTRAVENOUS ONCE
Status: COMPLETED | OUTPATIENT
Start: 2023-01-29 | End: 2023-01-30

## 2023-01-29 RX ORDER — ASPIRIN 81 MG/1
81 TABLET ORAL DAILY
Status: DISCONTINUED | OUTPATIENT
Start: 2023-01-29 | End: 2023-02-01 | Stop reason: HOSPADM

## 2023-01-29 RX ORDER — CARVEDILOL 12.5 MG/1
12.5 TABLET ORAL 2 TIMES DAILY WITH MEALS
Status: DISCONTINUED | OUTPATIENT
Start: 2023-01-29 | End: 2023-02-01 | Stop reason: HOSPADM

## 2023-01-29 RX ORDER — FUROSEMIDE 10 MG/ML
80 INJECTION INTRAMUSCULAR; INTRAVENOUS ONCE
Status: COMPLETED | OUTPATIENT
Start: 2023-01-29 | End: 2023-01-29

## 2023-01-29 RX ORDER — NITROGLYCERIN 0.4 MG/1
0.4 TABLET SUBLINGUAL ONCE
Status: DISCONTINUED | OUTPATIENT
Start: 2023-01-29 | End: 2023-01-31

## 2023-01-29 RX ORDER — HEPARIN SODIUM 10000 [USP'U]/100ML
3-20 INJECTION, SOLUTION INTRAVENOUS
Status: DISCONTINUED | OUTPATIENT
Start: 2023-01-29 | End: 2023-02-01 | Stop reason: HOSPADM

## 2023-01-29 RX ORDER — PRASUGREL 10 MG/1
5 TABLET, FILM COATED ORAL DAILY
Status: DISCONTINUED | OUTPATIENT
Start: 2023-01-29 | End: 2023-02-01 | Stop reason: HOSPADM

## 2023-01-29 RX ORDER — HEPARIN SODIUM 1000 [USP'U]/ML
4000 INJECTION, SOLUTION INTRAVENOUS; SUBCUTANEOUS ONCE
Status: COMPLETED | OUTPATIENT
Start: 2023-01-29 | End: 2023-01-29

## 2023-01-29 RX ORDER — ATORVASTATIN CALCIUM 40 MG/1
40 TABLET, FILM COATED ORAL
Status: DISCONTINUED | OUTPATIENT
Start: 2023-01-29 | End: 2023-02-01 | Stop reason: HOSPADM

## 2023-01-29 RX ORDER — NITROGLYCERIN 0.4 MG/1
0.4 TABLET SUBLINGUAL ONCE
Status: DISCONTINUED | OUTPATIENT
Start: 2023-01-29 | End: 2023-01-29

## 2023-01-29 RX ORDER — HEPARIN SODIUM 1000 [USP'U]/ML
2000 INJECTION, SOLUTION INTRAVENOUS; SUBCUTANEOUS EVERY 6 HOURS PRN
Status: DISCONTINUED | OUTPATIENT
Start: 2023-01-29 | End: 2023-02-01 | Stop reason: HOSPADM

## 2023-01-29 RX ORDER — TORSEMIDE 100 MG/1
100 TABLET ORAL DAILY
Status: DISCONTINUED | OUTPATIENT
Start: 2023-01-30 | End: 2023-02-01 | Stop reason: HOSPADM

## 2023-01-29 RX ORDER — DIVALPROEX SODIUM 125 MG/1
250 CAPSULE, COATED PELLETS ORAL EVERY 12 HOURS SCHEDULED
Status: DISCONTINUED | OUTPATIENT
Start: 2023-01-29 | End: 2023-01-29

## 2023-01-29 RX ORDER — LANOLIN ALCOHOL/MO/W.PET/CERES
3 CREAM (GRAM) TOPICAL
Status: DISCONTINUED | OUTPATIENT
Start: 2023-01-29 | End: 2023-02-01 | Stop reason: HOSPADM

## 2023-01-29 RX ADMIN — ONDANSETRON 4 MG: 2 INJECTION INTRAMUSCULAR; INTRAVENOUS at 08:51

## 2023-01-29 RX ADMIN — HEPARIN SODIUM 4000 UNITS: 1000 INJECTION INTRAVENOUS; SUBCUTANEOUS at 22:46

## 2023-01-29 RX ADMIN — LORAZEPAM 0.5 MG: 2 INJECTION INTRAMUSCULAR; INTRAVENOUS at 08:49

## 2023-01-29 RX ADMIN — FUROSEMIDE 80 MG: 10 INJECTION, SOLUTION INTRAMUSCULAR; INTRAVENOUS at 09:15

## 2023-01-29 RX ADMIN — MAGNESIUM SULFATE HEPTAHYDRATE 2 G: 40 INJECTION, SOLUTION INTRAVENOUS at 22:47

## 2023-01-29 RX ADMIN — Medication 3 MG: at 22:47

## 2023-01-29 RX ADMIN — VALPROATE SODIUM 250 MG: 100 INJECTION, SOLUTION INTRAVENOUS at 23:12

## 2023-01-29 RX ADMIN — HEPARIN SODIUM 11.1 UNITS/KG/HR: 10000 INJECTION, SOLUTION INTRAVENOUS at 22:46

## 2023-01-29 RX ADMIN — LORAZEPAM 0.5 MG: 2 INJECTION INTRAMUSCULAR; INTRAVENOUS at 23:10

## 2023-01-29 RX ADMIN — INSULIN LISPRO 1 UNITS: 100 INJECTION, SOLUTION INTRAVENOUS; SUBCUTANEOUS at 21:23

## 2023-01-29 RX ADMIN — HEPARIN SODIUM 5000 UNITS: 5000 INJECTION INTRAVENOUS; SUBCUTANEOUS at 15:45

## 2023-01-29 NOTE — QUICK NOTE
ESRD patient with pulmonary edema  Plan for UF today and regular HD tomorrow    Formal consult to follow     Gabby Khan MD  Nephrology Attending

## 2023-01-29 NOTE — CONSULTS
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 MRN: [id number]  Unit/Bed#: ICU 06 Encounter: [id number]    ASSESSMENT AND PLAN:  58 y o man with PMH of ESRD on HD MWF at [facility], previous admission with AMS, SOB, obesity, DM  CAD, GERD p/w SOB  Nephrology is consulted for management of ESRD     PLAN      #ESRD on HD MWF:  · Dialysis unit/days:[facility]   · Access:AVF  · UF today and regular dialysis tomorrow   · Renal Diet  · Fluid restriction 1-1 5L/d  · Adjust medications to GFR<10  · Avoid opioids     #Volume status/hypertension:  · Volume:hypervolemic   · Blood pressure:normotensive /763mmhg, goal <140/90mmhg   · Low sodium diet   · Avoid BP meds and nitro before dialysis  · Torsemide 100mg after dialysis today   · Educate family and patient to limit sodium and fluid intake     #Secondary Hyperparasitoidism   · iPTH levels   · Vit D iv  · Phoslo 667 mg tid with meals     #Hypoxic respiratory failure  · Secondary to pulmonary edema   · UF as above   · Fluid restriction 1 2Lday       # Anemia of Kidney Disease  · Current hemoglobin:10 9mg/dL   · Treatment:  · Transfuse for hemoglobin less than 7 0 per primary service        HISTORY OF PRESENT ILLNESS:  Requesting Physician: Alisson Rosado MD  Reason for Consult: Management of ESRD     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 PMH of ESRD on HD MWF at [facility], previous admission with AMS, SOB, obesity, DM  CAD, GERD who was admitted to [facility] after presenting with SOB   Family report patient has been drinking more fluids than normal   A renal consultation is requested today for assistance in the management of management of ESRD    PAST MEDICAL HISTORY:  Past Medical History:   Diagnosis Date   • Cerebrovascular accident (CVA) due to thrombosis of left middle cerebral artery ([facility] [address] ) 7/29/2018   • Chronic kidney disease    • Diabetes mellitus ([facility] [address] )    • GERD (gastroesophageal reflux disease)    • Hypercholesteremia    • Hyperlipidemia    • Hypertension    • 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   • Neuropathy    • Obesity    • Osteomyelitis ([address] )     last assessed 11/4/16   • PVC's (premature ventricular contractions)     sees cardiology Dr Hector camargo   • Stroke [facility])     last weeof July 2018 [address] [facility]   • TIA (transient ischemic attack) 10/28/2018       PAST SURGICAL HISTORY:  Past Surgical History:   Procedure Laterality Date   • ABDOMINAL SURGERY     • CARDIAC CATHETERIZATION N/A 5/2/2022    Procedure: Cardiac Coronary Angiogram;  Surgeon: Radha Ayala MD;  [facility]: [facility] CARDIAC CATH LAB; Service: Cardiology   • CARDIAC CATHETERIZATION N/A 5/2/2022    Procedure: Cardiac pci;  Surgeon: Radha Ayala MD;  Location: [facility] CARDIAC CATH LAB; Service: Cardiology   • CHOLECYSTECTOMY      Percutaneous   • COLONOSCOPY     • CYSTOSCOPY     • OTHER SURGICAL HISTORY      "stimulator to control bowel movements"   • IA ESOPHAGOGASTRODUODENOSCOPY TRANSORAL DIAGNOSTIC N/A 9/27/2016    Procedure: ESOPHAGOGASTRODUODENOSCOPY (EGD); Surgeon: Emery Padron MD;  Location: [facility] GI LAB;   Service: Gastroenterology   • IA LAPAROSCOPY SURG CHOLECYSTECTOMY N/A 2/29/2016    Procedure: LAPAROSCOPIC CHOLECYSTECTOMY ;  Surgeon: William Coleman DO;  Location: [facility] Main OR;  Service: General   • ROTATOR CUFF REPAIR Right    • TOE AMPUTATION Right 10/28/2016    Procedure: 3RD TOE AMPUTATION ;  Surgeon: Tristan Torres DPM;  Location: [facility] Main OR;  Service:        ALLERGIES:  No Known Allergies    SOCIAL HISTORY:  Social History     Substance and Sexual Activity   Alcohol Use Not Currently     Social History     Substance and Sexual Activity   Drug Use No     Social History     Tobacco Use   Smoking Status Never   Smokeless Tobacco Never       FAMILY HISTORY:  Family History   Problem Relation Age of Onset   • Leukemia Mother    • Liver disease Mother    • Lung cancer Mother         heavy smoker - 3 ppd   • Heart disease Father    • Liver disease Father    • Multiple myeloma Sister    • Breast cancer Sister    • Urolithiasis Family    • Alcohol abuse Neg Hx    • Depression Neg Hx    • Drug abuse Neg Hx    • Substance Abuse Neg Hx    • Mental illness Neg Hx        MEDICATIONS:    Current Facility-Administered Medications:   •  aspirin (ECOTRIN LOW STRENGTH) EC tablet 81 mg, 81 mg, Oral, Daily, Nelson Pete MD  •  atorvastatin (LIPITOR) tablet 40 mg, 40 mg, Oral, Daily With Jesus Whittington MD  •  calcium acetate (PHOSLO) capsule 667 mg, 667 mg, Oral, TID With Meals, Nelson Pete MD  •  carvedilol (COREG) tablet 12 5 mg, 12 5 mg, Oral, BID With Meals, Nelson Pete MD  •  divalproex sodium (DEPAKOTE SPRINKLE) capsule 250 mg, 250 mg, Oral, Q12H [facility], Nelson Pete MD  •  heparin (porcine) subcutaneous injection 5,000 Units, 5,000 Units, Subcutaneous, Q8H [facility] **AND** [COMPLETED] Platelet count, , , Once, Nelson Pete MD  •  insulin glargine (LANTUS) subcutaneous injection 14 Units 0 14 mL, 14 Units, Subcutaneous, Daily, Nelson Pete MD  •  insulin lispro (HumaLOG) 100 units/mL subcutaneous injection 1-5 Units, 1-5 Units, Subcutaneous, TID AC **AND** Fingerstick Glucose (POCT), , , TID AC, Nelson Pete MD  •  insulin lispro (HumaLOG) 100 units/mL subcutaneous injection 1-5 Units, 1-5 Units, Subcutaneous, HS, Nelson Pete MD  •  nitroglycerin (NITROSTAT) SL tablet 0 4 mg, 0 4 mg, Sublingual, Once, Nelson Pete MD  •  prasugrel (EFFIENT) tablet 5 mg, 5 mg, Oral, Daily, Nelson Pete MD  •  [START ON 1/30/2023] torsemide (DEMADEX) tablet 100 mg, 100 mg, Oral, Daily, Emile Diaz MD    REVIEW OF SYSTEMS:  Complete 10 point review of systems were obtained and discussed in length with the patient  Complete review of systems were negative / unremarkable except mentioned in the HPI section       Review of Systems - Psychological ROS: negative  Ophthalmic ROS: negative  ENT ROS: negative  Hematological and Lymphatic ROS: negative  Endocrine ROS: negative  Respiratory ROS: positive for - shortness of breath  Cardiovascular ROS: no chest pain or dyspnea on exertion  Gastrointestinal ROS: no abdominal pain, change in bowel habits, or black or bloody stools  Genito-Urinary ROS: no dysuria, trouble voiding, or hematuria  Musculoskeletal ROS: negative  Neurological ROS: no TIA or stroke symptoms  Dermatological ROS: negative     PHYSICAL EXAM:  Current Weight: Weight - Scale: 105 kg (230 lb 13 2 oz)  First Weight: Weight - Scale: 105 kg (230 lb 13 2 oz)  Vitals:    01/29/23 1430   BP: 140/92   Pulse: 94   Resp: (!) 31   Temp:    SpO2:        Intake/Output Summary (Last 24 hours) at 1/29/2023 1450  Last data filed at 1/29/2023 1415  Gross per 24 hour   Intake 200 ml   Output --   Net 200 ml     Wt Readings from Last 3 Encounters:   01/29/23 105 kg (230 lb 13 2 oz)   01/26/23 100 kg (221 lb)   01/13/23 103 kg (228 lb)     Temp Readings from Last 3 Encounters:   01/29/23 99 °F (37 2 °C) (Tympanic)   01/27/23 98 5 °F (36 9 °C)   01/14/23 98 6 °F (37 °C) (Oral)     BP Readings from Last 3 Encounters:   01/29/23 140/92   01/27/23 166/90   01/14/23 162/78     Pulse Readings from Last 3 Encounters:   01/29/23 94   01/27/23 77   01/14/23 70        General:  Obese, on bipap   Skin:  No acute rash  Eyes:  No scleral icterus and noninjected  ENT:  mucous membranes moist  Neck:  no carotid bruits  Chest:  Crackles , increasedrespiratory effort, no use of accessory respiratory muscles  CVS:  Regular rate and rhythm without a murmur  Abdomen:  soft and nontender   Extremities:  , no significant lower extremity edema  Neuro:  No gross focality  Psych:  Alert , cooperative   Vascular access:AVF good brui and thrill     Invasive Devices:      Lab Results:   Results from last 7 days   Lab Units 01/29/23  1415 01/29/23  0821 01/26/23  0453 01/25/23 1958   WBC Thousand/uL  --  11 83* 5 48 5 62   HEMOGLOBIN g/dL  --  10 9* 10 1* 10 1*   HEMATOCRIT %  --  35 6* 31 4* 31 5*   PLATELETS Thousands/uL 172 181 140* 129* POTASSIUM mmol/L  --  3 5 4 4 3 6   CHLORIDE mmol/L  --  114* 97 93*   CO2 mmol/L  --  20* 32 33*   BUN mg/dL  --  36* 29* 23   CREATININE mg/dL  --  5 40* 5 61* 4 50*   CALCIUM mg/dL  --  6 0* 9 1 9 4   MAGNESIUM mg/dL  --  1 5*  --   --    PHOSPHORUS mg/dL  --  3 0  --   --        Other Studies:   XR chest 1 view portable   Final Result by Ailyn Gonzalez MD (01/29 1027)      Moderate pulmonary edema with trace effusions  Workstation performed: [id number]             Portions of the record may have been created with voice recognition software  Occasional wrong word or "sound a like" substitutions may have occurred due to the inherent limitations of voice recognition software  Read the chart carefully and recognize, using context, where substitutions have occurred

## 2023-01-29 NOTE — ASSESSMENT & PLAN NOTE
Lab Results   Component Value Date    HGBA1C 5 5 12/23/2022       Recent Labs     01/29/23  0842   POCGLU 139       Blood Sugar Average: Last 72 hrs:  (P) 139   Resume home insulin regimen when pt able to eat

## 2023-01-29 NOTE — PLAN OF CARE
Post-Dialysis RN Treatment Note    Blood Pressure:  Pre 134/92 mm/Hg  Post   131/74  mmHg   EDW  TBD kg    Weight:  Pre  105  kg   Post 102 5 kg   Mode of weight measurement:  Bed   Volume Removed     2500   ml    Treatment duration 120 minutes    NS given      Treatment shortened? Medications given during Rx   NA   Estimated Kt/V  NA   Access type: AVF   Access Issues:  NA   Report called to primary nurse   [facility] , RN          Goal of treatment is the removal of 2 5 kg fluid in this treatment session          Problem: METABOLIC, FLUID AND ELECTROLYTES - ADULT  Goal: Electrolytes maintained within normal limits  Description: INTERVENTIONS:  - Monitor labs and assess patient for signs and symptoms of electrolyte imbalances  - Administer electrolyte replacement as ordered  - Monitor response to electrolyte replacements, including repeat lab results as appropriate  - Instruct patient on fluid and nutrition as appropriate  Outcome: Progressing  Goal: Fluid balance maintained  Description: INTERVENTIONS:  - Monitor labs   - Monitor I/O and WT  - Instruct patient on fluid and nutrition as appropriate  - Assess for signs & symptoms of volume excess or deficit  Outcome: Progressing

## 2023-01-29 NOTE — ASSESSMENT & PLAN NOTE
Lab Results   Component Value Date    EGFR 10 01/29/2023    EGFR 9 01/26/2023    EGFR 13 01/25/2023    CREATININE 5 40 (H) 01/29/2023    CREATININE 5 61 (H) 01/26/2023    CREATININE 4 50 (H) 01/25/2023   nephrology consulted  Plan for UF today

## 2023-01-29 NOTE — RESPIRATORY THERAPY NOTE
01/29/23 0911   Respiratory Assessment   Resp Comments placed pt on bipap, pt unwilling to lay on back for proper mask adjustment   Non-Invasive Information   O2 Interface Device Face mask  (med)   Non-Invasive Ventilation Mode BiPAP   $ Continous NIV Initial   $ Pulse Oximetry Spot Check Charge Completed   Non-Invasive Settings   IPAP (cm) 14 cm   EPAP (cm) 8 cm   Rate (Set) 16   FiO2 (%) 40   Rise Time 3   Inspiratory Time (Set) 0 9   Non-Invasive Readings   Skin Intervention Skin intact   Total Rate 28   MV (Mech) 17   Peak Pressure (Obs) 15   Spontaneous Vt (mL) 601   Leak (lpm) 40   Non-Invasive Alarms   Insp Pressure High (cm H20) 25   Insp Pressure Low (cm H20) 5   Low Insp Pressure Time (sec) 20 sec   MV Low (L/min) 3   Vt High (mL) 1400   Vt Low (mL) 200   High Resp Rate (BPM) 50 BPM   Low Resp Rate (BPM) 8 BPM

## 2023-01-29 NOTE — ASSESSMENT & PLAN NOTE
· POA with sudden onset of SOB with chest pain     · CXR with moderate edema, b/l effusions  · Pt's spouse says he has not been following fluid restriction   · Started on BIPAP in the ED d/t resp distress; appears comfortable now, but could not wean from bipap  · S/p lasix 80mg x1  · Admit to SD2  · Nephrology aware and plan for HD today

## 2023-01-29 NOTE — ASSESSMENT & PLAN NOTE
· POA with troponin of 4K  · No current chest pain   EKG comparable to baseline  · Suspect nonMI elevation in the setting of flash pulm edema with known CAD

## 2023-01-29 NOTE — H&P
[facility]  H&P- Ever Canary 1960, 58 y o  male MRN: [id number]  Unit/Bed#: ICU 06 Encounter: [id number]  Primary Care Provider: Mahesh Monterroso DO   Date and time admitted to hospital: 1/29/2023  8:10 AM    * Volume overload  Assessment & Plan  · POA with sudden onset of SOB with chest pain  · CXR with moderate edema, b/l effusions  · Pt's spouse says he has not been following fluid restriction   · Started on BIPAP in the ED d/t resp distress; appears comfortable now, but could not wean from bipap  · S/p lasix 80mg x1  · Admit to [facility]  · Nephrology aware and plan for HD today      ESRD on dialysis [facility])  Assessment & Plan  Lab Results   Component Value Date    EGFR 10 01/29/2023    EGFR 9 01/26/2023    EGFR 13 01/25/2023    CREATININE 5 40 (H) 01/29/2023    CREATININE 5 61 (H) 01/26/2023    CREATININE 4 50 (H) 01/25/2023   nephrology consulted  Plan for UF today  Elevated troponin with chest pain  Assessment & Plan  · POA with troponin of 4K  · No current chest pain  EKG comparable to baseline  · Suspect nonMI elevation in the setting of flash pulm edema with known CAD     Coronary artery disease involving native coronary artery of native heart without angina pectoris  Assessment & Plan  · Reviewed last cardiac catheterization  · Cont coreg  · Cont effient     Diabetic polyneuropathy associated with type 2 diabetes mellitus [facility])  Assessment & Plan  Lab Results   Component Value Date    HGBA1C 5 5 12/23/2022       Recent Labs     01/29/23  0842   POCGLU 139       Blood Sugar Average: Last 72 hrs:  (P) 139   Resume home insulin regimen when pt able to eat  History of stroke  Assessment & Plan   Last MRI brain in 4/22: "Chronic lacunar infarct in the left ventral medial thalamus  Small, chronic infarct in the right parietal lobe"         VTE Pharmacologic Prophylaxis: VTE Score: 5 High Risk (Score >/= 5) - Pharmacological DVT Prophylaxis Ordered: heparin   Sequential Compression Devices Ordered  Code Status:  FULL   Discussion with family: Updated  (wife) at bedside  Anticipated Length of Stay: Patient will be admitted on an inpatient basis with an anticipated length of stay of greater than 2 midnights secondary to Acute respiratory failure secondary to volume overload  Total Time for Visit, including Counseling / Coordination of Care: 70 minutes Greater than 50% of this total time spent on direct patient counseling and coordination of care  Chief Complaint: SOB     History of Present Illness:  Lowella Curling is a 58 y o  male with a PMH of end-stage renal disease on dialysis, CVA, CAD who presents with cute onset of shortness of breath overnight  Patient's wife at bedside provides details of the history  Patient woke up complaining of chest pressure and shortness of breath  He was brought immediately to the ED  According to ED provider patient appeared in respiratory distress and was started on BiPAP with improvement in his work of breathing  Patient's wife noted that he has not compliant with his fluid restriction  He does complete dialysis sessions and his last dialysis session was on Friday  No fevers or chills  Patient did have a cough week prior to onset  Pt denies chest pain at this time  Review of Systems:  Review of Systems   Constitutional: Negative  Negative for chills and fever  Respiratory: Positive for cough and shortness of breath  Negative for wheezing  Cardiovascular: Positive for chest pain  Negative for palpitations and leg swelling  Gastrointestinal: Negative  Genitourinary: Negative  Neurological: Negative  Psychiatric/Behavioral: Negative  All other systems reviewed and are negative        Past Medical and Surgical History:   Past Medical History:   Diagnosis Date   • Cerebrovascular accident (CVA) due to thrombosis of left middle cerebral artery ([facility] [address] ) 7/29/2018   • Chronic kidney disease    • Diabetes mellitus [facility])    • GERD (gastroesophageal reflux disease)    • Hypercholesteremia    • Hyperlipidemia    • Hypertension    • 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   • Neuropathy    • Obesity    • Osteomyelitis ([address] )     last assessed 11/4/16   • PVC's (premature ventricular contractions)     sees cardiology Dr Austin camargo   • Stroke [facility])     last weeof July 2018 [address] [facility]   • TIA (transient ischemic attack) 10/28/2018       Past Surgical History:   Procedure Laterality Date   • ABDOMINAL SURGERY     • CARDIAC CATHETERIZATION N/A 5/2/2022    Procedure: Cardiac Coronary Angiogram;  Surgeon: Raymon Crowley MD;  [facility]: [facility] CARDIAC CATH LAB; Service: Cardiology   • CARDIAC CATHETERIZATION N/A 5/2/2022    Procedure: Cardiac pci;  Surgeon: Raymon Crowley MD;  Location: [facility] CARDIAC CATH LAB; Service: Cardiology   • CHOLECYSTECTOMY      Percutaneous   • COLONOSCOPY     • CYSTOSCOPY     • OTHER SURGICAL HISTORY      "stimulator to control bowel movements"   • MO ESOPHAGOGASTRODUODENOSCOPY TRANSORAL DIAGNOSTIC N/A 9/27/2016    Procedure: ESOPHAGOGASTRODUODENOSCOPY (EGD); Surgeon: Ashley Rocha MD;  Location: [facility] GI LAB; Service: Gastroenterology   • MO LAPAROSCOPY SURG CHOLECYSTECTOMY N/A 2/29/2016    Procedure: LAPAROSCOPIC CHOLECYSTECTOMY ;  Surgeon: Juan Williamson DO;  Location: [facility] Main OR;  Service: General   • ROTATOR CUFF REPAIR Right    • TOE AMPUTATION Right 10/28/2016    Procedure: 3RD TOE AMPUTATION ;  Surgeon: Aldo Goldman DPM;  Location: [facility] Main OR;  Service:        Meds/Allergies:  Prior to Admission medications    Medication Sig Start Date End Date Taking?  Authorizing Provider   aspirin (ECOTRIN LOW STRENGTH) 81 mg EC tablet Take 81 mg by mouth daily Resume on 8/14      Historical Provider, MD   atorvastatin (LIPITOR) 40 mg tablet TAKE 1 TABLET BY MOUTH EVERY DAY WITH DINNER 12/16/22   Raj Gillespie DO   Blood Glucose Monitoring Suppl (True Metrix Meter) w/Device KIT Use to test blood sugars 3 times daily 7/1/22   Kendy Smith MD   Blood Pressure Monitoring (BLOOD PRESSURE CUFF) MISC Use to check blood pressure before taking blood pressure medication and 1 hour after and follow instructions provided in discharge instructions based on the readings   8/13/18   Lauren Gonzalez MD   calcium acetate (CALPHRON) 667 mg 3 tablets 3x per day 10/17/21   Historical Provider, MD   carvedilol (COREG) 12 5 mg tablet Take 1 tablet (12 5 mg total) by mouth 2 (two) times a day with meals 10/13/22 1/5/23  Kerry Kim MD   Cholecalciferol (Vitamin D3) 1 25 MG (89974 UT) CAPS TAKE 1 CAPSULE BY MOUTH ONE TIME PER WEEK 6/3/22   Camille Thomason MD   divalproex sodium (DEPAKOTE SPRINKLE) 125 MG capsule Take 2 capsules (250 mg total) by mouth every 12 (twelve) hours 1/25/23   Raj Iqbal,    glucose blood (True Metrix Blood Glucose Test) test strip Use 1 each 3 (three) times a day Use as instructed 7/1/22   Kendy Smith MD   insulin glargine (Lantus) 100 units/mL subcutaneous injection Inject 14 Units under the skin daily 12/14/22   Kendy Smith MD   insulin lispro (HumaLOG KwikPen) 100 units/mL injection pen INJECT 4 UNITS 3 TIMES A DAY WITH MEALS PLUS SCALE (max daily dose 42 units) 12/14/22   Kendy Smith MD   Insulin Pen Needle (BD Pen Needle Adina U/F) 32G X 4 MM MISC Use 4 times daily 12/14/22   Kendy Smith MD   Insulin Syringe-Needle U-100 (B-D INS SYRINGE 0 5CC/30GX1/2") 30G X 1/2" 0 5 ML MISC Inject once daily 12/14/22   Kendy Smith MD   Lancets Ultra Thin 30G MISC Use 3 times a day 3/28/22   Lissette Brennan PA-C   Methoxy PEG-Epoetin Beta 30 MCG/0 3ML SOSY 30 mcg  Patient not taking: Reported on 1/3/2023 5/16/22 5/15/23  Historical Provider, MD Tirso Hernandez LANCETS 67O [address] [facility] by Does not apply route 3 (three) times a day 11/27/18   Raj Auguste DO   prasugrel (EFFIENT) tablet Take 1 tablet by mouth daily 8/2/22   Historical Provider, MD   torsemide (DEMADEX) 100 mg tablet Take 0 5 tablets (50 mg total) by mouth daily As directed by your nephrologist 1/13/23   Mony Harris DO     I have reviewed home medications using recent Epic encounter  Allergies: No Known Allergies    Social History:  Marital Status: /Civil Union   Occupation:   Patient Pre-hospital Living Situation: Home  Patient Pre-hospital Level of Mobility: walks  Patient Pre-hospital Diet Restrictions:   Substance Use History:   Social History     Substance and Sexual Activity   Alcohol Use Not Currently     Social History     Tobacco Use   Smoking Status Never   Smokeless Tobacco Never     Social History     Substance and Sexual Activity   Drug Use No       Family History:  Family History   Problem Relation Age of Onset   • Leukemia Mother    • Liver disease Mother    • Lung cancer Mother         heavy smoker - 3 ppd   • Heart disease Father    • Liver disease Father    • Multiple myeloma Sister    • Breast cancer Sister    • Urolithiasis Family    • Alcohol abuse Neg Hx    • Depression Neg Hx    • Drug abuse Neg Hx    • Substance Abuse Neg Hx    • Mental illness Neg Hx        Physical Exam:     Vitals:   Blood Pressure: 137/80 (01/29/23 1200)  Pulse: 91 (01/29/23 1200)  Temperature: 97 7 °F (36 5 °C) (01/29/23 0814)  Temp Source: Oral (01/29/23 0814)  Respirations: (!) 26 (01/29/23 1115)  Height: 5' 9" (175 3 cm) (01/29/23 1200)  Weight - Scale: 105 kg (230 lb 13 2 oz) (01/29/23 1200)  SpO2: 99 % (01/29/23 1115)    Physical Exam  Vitals and nursing note reviewed  Constitutional:       General: He is not in acute distress  Appearance: He is not ill-appearing  HENT:      Head: Normocephalic and atraumatic  Cardiovascular:      Rate and Rhythm: Normal rate and regular rhythm  Pulmonary:      Effort: Pulmonary effort is normal  No respiratory distress  Breath sounds: Normal breath sounds  No wheezing  Abdominal:      General: Abdomen is flat  There is distension        Palpations: Abdomen is soft       Tenderness: There is no abdominal tenderness  There is no guarding or rebound  Skin:     General: Skin is warm and dry  Neurological:      General: No focal deficit present  Mental Status: He is alert and oriented to person, place, and time  Psychiatric:         Mood and Affect: Mood normal          Behavior: Behavior normal           Additional Data:     Lab Results:  Results from last 7 days   Lab Units 01/29/23  0821   WBC Thousand/uL 11 83*   HEMOGLOBIN g/dL 10 9*   HEMATOCRIT % 35 6*   PLATELETS Thousands/uL 181   NEUTROS PCT % 78*   LYMPHS PCT % 11*   MONOS PCT % 9   EOS PCT % 1     Results from last 7 days   Lab Units 01/29/23  0821   SODIUM mmol/L 141   POTASSIUM mmol/L 3 5   CHLORIDE mmol/L 114*   CO2 mmol/L 20*   BUN mg/dL 36*   CREATININE mg/dL 5 40*   ANION GAP mmol/L 7   CALCIUM mg/dL 6 0*   ALBUMIN g/dL 2 7*   TOTAL BILIRUBIN mg/dL 0 44   ALK PHOS U/L 51   ALT U/L 7   AST U/L 13   GLUCOSE RANDOM mg/dL 114         Results from last 7 days   Lab Units 01/29/23  0842 01/26/23  1128 01/26/23  0713 01/25/23  2227   POC GLUCOSE mg/dl 139 125 105 127         Results from last 7 days   Lab Units 01/29/23  0821   LACTIC ACID mmol/L 1 1       Lines/Drains:  Invasive Devices     Peripheral Intravenous Line  Duration           Peripheral IV 01/29/23 Right Antecubital <1 day    Peripheral IV 01/29/23 Right Wrist <1 day          Line  Duration           Hemodialysis AV Fistula Left Upper arm -- days              Imaging: Reviewed radiology reports from this admission including: chest xray  XR chest 1 view portable   Final Result by Paola Christine MD (01/29 1027)      Moderate pulmonary edema with trace effusions  Workstation performed: [id number]             EKG and Other Studies Reviewed on Admission:   · EKG: NSR  HR 76 with RBBB   ** Please Note: This note has been constructed using a voice recognition system   **

## 2023-01-29 NOTE — ASSESSMENT & PLAN NOTE
Last MRI brain in 4/22: "Chronic lacunar infarct in the left ventral medial thalamus   Small, chronic infarct in the right parietal lobe"

## 2023-01-30 ENCOUNTER — APPOINTMENT (INPATIENT)
Dept: DIALYSIS | Facility: HOSPITAL | Age: 63
End: 2023-01-30

## 2023-01-30 ENCOUNTER — APPOINTMENT (INPATIENT)
Dept: RADIOLOGY | Facility: HOSPITAL | Age: 63
End: 2023-01-30

## 2023-01-30 ENCOUNTER — APPOINTMENT (INPATIENT)
Dept: NON INVASIVE DIAGNOSTICS | Facility: HOSPITAL | Age: 63
End: 2023-01-30

## 2023-01-30 ENCOUNTER — TELEPHONE (OUTPATIENT)
Dept: ENDOCRINOLOGY | Facility: CLINIC | Age: 63
End: 2023-01-30

## 2023-01-30 PROBLEM — J96.01 ACUTE RESPIRATORY FAILURE WITH HYPOXIA (HCC): Status: ACTIVE | Noted: 2023-01-29

## 2023-01-30 PROBLEM — I50.9 ACUTE EXACERBATION OF CHF (CONGESTIVE HEART FAILURE) (HCC): Status: ACTIVE | Noted: 2023-01-30

## 2023-01-30 PROBLEM — R06.03 RESPIRATORY DISTRESS, ACUTE: Status: ACTIVE | Noted: 2023-01-29

## 2023-01-30 LAB
ALBUMIN SERPL BCP-MCNC: 4 G/DL (ref 3.5–5)
ALP SERPL-CCNC: 60 U/L (ref 34–104)
ALT SERPL W P-5'-P-CCNC: 13 U/L (ref 7–52)
ANION GAP SERPL CALCULATED.3IONS-SCNC: 13 MMOL/L (ref 4–13)
AORTIC ROOT: 3.6 CM
AORTIC VALVE MEAN VELOCITY: 14.8 M/S
APICAL FOUR CHAMBER EJECTION FRACTION: 21 %
APTT PPP: 38 SECONDS (ref 23–37)
APTT PPP: 46 SECONDS (ref 23–37)
APTT PPP: 53 SECONDS (ref 23–37)
ASCENDING AORTA: 4 CM
AST SERPL W P-5'-P-CCNC: 27 U/L (ref 13–39)
ATRIAL RATE: 100 BPM
ATRIAL RATE: 100 BPM
ATRIAL RATE: 107 BPM
AV AREA BY CONTINUOUS VTI: 2 CM2
AV AREA PEAK VELOCITY: 1.9 CM2
AV LVOT MEAN GRADIENT: 1 MMHG
AV LVOT PEAK GRADIENT: 2 MMHG
AV MEAN GRADIENT: 9 MMHG
AV PEAK GRADIENT: 14 MMHG
AV VALVE AREA: 1.97 CM2
AV VELOCITY RATIO: 0.36
BASE EXCESS BLDA CALC-SCNC: 0 MMOL/L (ref -2–3)
BASOPHILS # BLD AUTO: 0.03 THOUSANDS/ÂΜL (ref 0–0.1)
BASOPHILS NFR BLD AUTO: 0 % (ref 0–1)
BILIRUB SERPL-MCNC: 0.75 MG/DL (ref 0.2–1)
BUN SERPL-MCNC: 68 MG/DL (ref 5–25)
CA-I BLD-SCNC: 1.06 MMOL/L (ref 1.12–1.32)
CALCIUM SERPL-MCNC: 8.8 MG/DL (ref 8.4–10.2)
CHLORIDE SERPL-SCNC: 98 MMOL/L (ref 96–108)
CHOLEST SERPL-MCNC: 76 MG/DL
CO2 SERPL-SCNC: 25 MMOL/L (ref 21–32)
CREAT SERPL-MCNC: 9.62 MG/DL (ref 0.6–1.3)
DOP CALC AO PEAK VEL: 1.87 M/S
DOP CALC AO VTI: 40.18 CM
DOP CALC LVOT AREA: 5.31 CM2
DOP CALC LVOT DIAMETER: 2.6 CM
DOP CALC LVOT PEAK VEL VTI: 14.88 CM
DOP CALC LVOT PEAK VEL: 0.67 M/S
DOP CALC LVOT STROKE INDEX: 35.6 ML/M2
DOP CALC LVOT STROKE VOLUME: 78.96 CM3
E WAVE DECELERATION TIME: 133 MS
EOSINOPHIL # BLD AUTO: 0.04 THOUSAND/ÂΜL (ref 0–0.61)
EOSINOPHIL NFR BLD AUTO: 0 % (ref 0–6)
ERYTHROCYTE [DISTWIDTH] IN BLOOD BY AUTOMATED COUNT: 15.6 % (ref 11.6–15.1)
FIO2 GAS DIL.REBREATH: 30 L
FRACTIONAL SHORTENING: 25 % (ref 28–44)
GFR SERPL CREATININE-BSD FRML MDRD: 5 ML/MIN/1.73SQ M
GLUCOSE SERPL-MCNC: 113 MG/DL (ref 65–140)
GLUCOSE SERPL-MCNC: 134 MG/DL (ref 65–140)
GLUCOSE SERPL-MCNC: 137 MG/DL (ref 65–140)
GLUCOSE SERPL-MCNC: 140 MG/DL (ref 65–140)
GLUCOSE SERPL-MCNC: 142 MG/DL (ref 65–140)
GLUCOSE SERPL-MCNC: 142 MG/DL (ref 65–140)
HBV CORE AB SER QL: NORMAL
HBV CORE IGM SER QL: NORMAL
HBV SURFACE AB SER-ACNC: 333.22 MIU/ML
HBV SURFACE AG SER QL: NORMAL
HCO3 BLDA-SCNC: 23.7 MMOL/L (ref 24–30)
HCT VFR BLD AUTO: 30.2 % (ref 36.5–49.3)
HCT VFR BLD CALC: 29 % (ref 36.5–49.3)
HCV AB SER QL: NORMAL
HDLC SERPL-MCNC: 30 MG/DL
HGB BLD-MCNC: 9.6 G/DL (ref 12–17)
HGB BLDA-MCNC: 9.9 G/DL (ref 12–17)
IMM GRANULOCYTES # BLD AUTO: 0.11 THOUSAND/UL (ref 0–0.2)
IMM GRANULOCYTES NFR BLD AUTO: 1 % (ref 0–2)
INTERVENTRICULAR SEPTUM IN DIASTOLE (PARASTERNAL SHORT AXIS VIEW): 1.3 CM
INTERVENTRICULAR SEPTUM: 1.3 CM (ref 0.6–1.1)
LAAS-AP2: 27.5 CM2
LAAS-AP4: 29.5 CM2
LDLC SERPL CALC-MCNC: 21 MG/DL (ref 0–100)
LEFT ATRIUM SIZE: 5.3 CM
LEFT INTERNAL DIMENSION IN SYSTOLE: 4.1 CM (ref 2.1–4)
LEFT VENTRICLE DIASTOLIC VOLUME (MOD BIPLANE): 210 ML
LEFT VENTRICLE SYSTOLIC VOLUME (MOD BIPLANE): 158 ML
LEFT VENTRICULAR INTERNAL DIMENSION IN DIASTOLE: 5.5 CM (ref 3.5–6)
LEFT VENTRICULAR POSTERIOR WALL IN END DIASTOLE: 1.3 CM
LEFT VENTRICULAR STROKE VOLUME: 74 ML
LV EF: 25 %
LVSV (TEICH): 74 ML
LYMPHOCYTES # BLD AUTO: 1.42 THOUSANDS/ÂΜL (ref 0.6–4.47)
LYMPHOCYTES NFR BLD AUTO: 13 % (ref 14–44)
MCH RBC QN AUTO: 30.8 PG (ref 26.8–34.3)
MCHC RBC AUTO-ENTMCNC: 31.8 G/DL (ref 31.4–37.4)
MCV RBC AUTO: 97 FL (ref 82–98)
MITRAL REGURGITATION PEAK VELOCITY: 4.63 M/S
MITRAL VALVE MEAN INFLOW VELOCITY: 3.48 M/S
MITRAL VALVE REGURGITANT PEAK GRADIENT: 86 MMHG
MONOCYTES # BLD AUTO: 1.24 THOUSAND/ÂΜL (ref 0.17–1.22)
MONOCYTES NFR BLD AUTO: 11 % (ref 4–12)
MV PEAK A VEL: 0.55 M/S
MV PEAK E VEL: 127 CM/S
MV STENOSIS PRESSURE HALF TIME: 39 MS
MV VALVE AREA P 1/2 METHOD: 5.64 CM2
NEUTROPHILS # BLD AUTO: 8.16 THOUSANDS/ÂΜL (ref 1.85–7.62)
NEUTS SEG NFR BLD AUTO: 75 % (ref 43–75)
NONHDLC SERPL-MCNC: 46 MG/DL
NRBC BLD AUTO-RTO: 0 /100 WBCS
P AXIS: 58 DEGREES
P AXIS: 65 DEGREES
P AXIS: 65 DEGREES
PCO2 BLD: 25 MMOL/L (ref 21–32)
PCO2 BLD: 28 MM HG
PCO2 BLD: 32.8 MM HG (ref 42–50)
PCO2 BLDA: 32.8 MM HG
PH BLD: 7.47 [PH]
PH BLD: 7.47 [PH] (ref 7.3–7.4)
PLATELET # BLD AUTO: 148 THOUSANDS/UL (ref 149–390)
PMV BLD AUTO: 10.6 FL (ref 8.9–12.7)
PO2 BLD: 28 MM HG (ref 35–45)
POTASSIUM BLD-SCNC: 5.4 MMOL/L (ref 3.5–5.3)
POTASSIUM SERPL-SCNC: 5.2 MMOL/L (ref 3.5–5.3)
PR INTERVAL: 128 MS
PR INTERVAL: 138 MS
PR INTERVAL: 156 MS
PROT SERPL-MCNC: 7.2 G/DL (ref 6.4–8.4)
QRS AXIS: 28 DEGREES
QRS AXIS: 57 DEGREES
QRS AXIS: 80 DEGREES
QRSD INTERVAL: 150 MS
QRSD INTERVAL: 154 MS
QRSD INTERVAL: 156 MS
QT INTERVAL: 382 MS
QT INTERVAL: 392 MS
QT INTERVAL: 408 MS
QTC INTERVAL: 505 MS
QTC INTERVAL: 509 MS
QTC INTERVAL: 526 MS
RA PRESSURE ESTIMATED: 8 MMHG
RBC # BLD AUTO: 3.12 MILLION/UL (ref 3.88–5.62)
RIGHT ATRIUM AREA SYSTOLE A4C: 24.1 CM2
RIGHT VENTRICLE ID DIMENSION: 4.9 CM
RV PSP: 50 MMHG
SAO2 % BLD FROM PO2: 59 % (ref 60–85)
SL CV DOP CALC MV VTI RETROGRADE: 132.9 CM
SL CV LEFT ATRIUM LENGTH A2C: 6.4 CM
SL CV LV EF: 25
SL CV MV MEAN GRADIENT RETROGRADE: 55 MMHG
SL CV PED ECHO LEFT VENTRICLE DIASTOLIC VOLUME (MOD BIPLANE) 2D: 147 ML
SL CV PED ECHO LEFT VENTRICLE SYSTOLIC VOLUME (MOD BIPLANE) 2D: 73 ML
SODIUM BLD-SCNC: 134 MMOL/L (ref 136–145)
SODIUM SERPL-SCNC: 136 MMOL/L (ref 135–147)
SPECIMEN SOURCE: ABNORMAL
T WAVE AXIS: 60 DEGREES
T WAVE AXIS: 63 DEGREES
T WAVE AXIS: 63 DEGREES
TR MAX PG: 42 MMHG
TR PEAK VELOCITY: 3.2 M/S
TRICUSPID ANNULAR PLANE SYSTOLIC EXCURSION: 1.2 CM
TRICUSPID VALVE PEAK REGURGITATION VELOCITY: 3.23 M/S
TRIGL SERPL-MCNC: 123 MG/DL
VENTRICULAR RATE: 100 BPM
VENTRICULAR RATE: 100 BPM
VENTRICULAR RATE: 107 BPM
WBC # BLD AUTO: 11 THOUSAND/UL (ref 4.31–10.16)

## 2023-01-30 PROCEDURE — 5A1D70Z PERFORMANCE OF URINARY FILTRATION, INTERMITTENT, LESS THAN 6 HOURS PER DAY: ICD-10-PCS | Performed by: INTERNAL MEDICINE

## 2023-01-30 RX ORDER — INSULIN LISPRO 100 [IU]/ML
1-5 INJECTION, SOLUTION INTRAVENOUS; SUBCUTANEOUS
Status: DISCONTINUED | OUTPATIENT
Start: 2023-01-30 | End: 2023-02-01 | Stop reason: HOSPADM

## 2023-01-30 RX ORDER — LEVALBUTEROL 1.25 MG/.5ML
SOLUTION, CONCENTRATE RESPIRATORY (INHALATION)
Status: COMPLETED
Start: 2023-01-30 | End: 2023-01-30

## 2023-01-30 RX ORDER — CHLORHEXIDINE GLUCONATE 0.12 MG/ML
15 RINSE ORAL EVERY 12 HOURS SCHEDULED
Status: DISCONTINUED | OUTPATIENT
Start: 2023-01-30 | End: 2023-02-01 | Stop reason: HOSPADM

## 2023-01-30 RX ORDER — SODIUM CHLORIDE, SODIUM GLUCONATE, SODIUM ACETATE, POTASSIUM CHLORIDE, MAGNESIUM CHLORIDE, SODIUM PHOSPHATE, DIBASIC, AND POTASSIUM PHOSPHATE .53; .5; .37; .037; .03; .012; .00082 G/100ML; G/100ML; G/100ML; G/100ML; G/100ML; G/100ML; G/100ML
500 INJECTION, SOLUTION INTRAVENOUS ONCE
Status: DISCONTINUED | OUTPATIENT
Start: 2023-01-30 | End: 2023-01-30

## 2023-01-30 RX ORDER — LEVALBUTEROL 1.25 MG/.5ML
1.25 SOLUTION, CONCENTRATE RESPIRATORY (INHALATION) 3 TIMES DAILY PRN
Status: DISCONTINUED | OUTPATIENT
Start: 2023-01-30 | End: 2023-02-01 | Stop reason: HOSPADM

## 2023-01-30 RX ORDER — MAGNESIUM SULFATE HEPTAHYDRATE 40 MG/ML
2 INJECTION, SOLUTION INTRAVENOUS ONCE
Status: COMPLETED | OUTPATIENT
Start: 2023-01-30 | End: 2023-01-31

## 2023-01-30 RX ORDER — DEXMEDETOMIDINE HYDROCHLORIDE 4 UG/ML
.1-.7 INJECTION, SOLUTION INTRAVENOUS
Status: DISCONTINUED | OUTPATIENT
Start: 2023-01-30 | End: 2023-01-31

## 2023-01-30 RX ORDER — LORAZEPAM 2 MG/ML
0.5 INJECTION INTRAMUSCULAR ONCE
Status: COMPLETED | OUTPATIENT
Start: 2023-01-30 | End: 2023-01-30

## 2023-01-30 RX ORDER — BUMETANIDE 0.25 MG/ML
4 INJECTION, SOLUTION INTRAMUSCULAR; INTRAVENOUS ONCE
Status: DISCONTINUED | OUTPATIENT
Start: 2023-01-30 | End: 2023-01-30

## 2023-01-30 RX ADMIN — LEVALBUTEROL HYDROCHLORIDE 1.25 MG: 1.25 SOLUTION, CONCENTRATE RESPIRATORY (INHALATION) at 14:50

## 2023-01-30 RX ADMIN — HEPARIN SODIUM 2000 UNITS: 1000 INJECTION INTRAVENOUS; SUBCUTANEOUS at 07:01

## 2023-01-30 RX ADMIN — LORAZEPAM 0.5 MG: 2 INJECTION INTRAMUSCULAR; INTRAVENOUS at 00:10

## 2023-01-30 RX ADMIN — VALPROATE SODIUM 250 MG: 100 INJECTION, SOLUTION INTRAVENOUS at 22:36

## 2023-01-30 RX ADMIN — HEPARIN SODIUM 4000 UNITS: 1000 INJECTION INTRAVENOUS; SUBCUTANEOUS at 14:15

## 2023-01-30 RX ADMIN — HEPARIN SODIUM 17.1 UNITS/KG/HR: 10000 INJECTION, SOLUTION INTRAVENOUS at 20:55

## 2023-01-30 RX ADMIN — HEPARIN SODIUM 2000 UNITS: 1000 INJECTION INTRAVENOUS; SUBCUTANEOUS at 22:36

## 2023-01-30 RX ADMIN — VALPROATE SODIUM 250 MG: 100 INJECTION, SOLUTION INTRAVENOUS at 09:45

## 2023-01-30 RX ADMIN — IPRATROPIUM BROMIDE 0.5 MG: 0.5 SOLUTION RESPIRATORY (INHALATION) at 14:50

## 2023-01-30 NOTE — PROGRESS NOTES
[facility]  Progress Note - Zahira Troncoso 1960, 58 y o  male MRN: [id number]  Unit/Bed#: ICU 06 Encounter: [id number]  Primary Care Provider: Nga Liriano DO   Date and time admitted to hospital: 1/29/2023  8:10 AM    * Acute respiratory failure with hypoxia ([address] )  Assessment & Plan  · POA with sudden onset of SOB with chest pain  · CXR with moderate edema, b/l effusions  · Pt's spouse says he has not been following fluid restriction   · Started on BIPAP in the ED d/t resp distress; appears comfortable now, but could not wean from bipap  · S/p lasix 80mg x1  · Admitted to [facility]  · Nephrology aware and plan for HD today    Plan:  Requiring BiPAP all morning for increased work of breathing, respiratory rate  Discussed case with ICU team, agreeable to transfer care to ICU  ABG      Elevated troponin with chest pain  Assessment & Plan  · POA with troponin of 4K  · No current chest pain  EKG comparable to baseline  · Suspect nonMI elevation in the setting of flash pulm edema with known bad CAD  · 1/30 ECHO: LVEF 25-30%    Plan:  Cardiology consulted; appreciate recs  Cath lab not indicated at this time, to reassess tomorrow  Continue heparin gtt  Telemetry    ESRD on dialysis [facility])  Assessment & Plan  Lab Results   Component Value Date    EGFR 5 01/30/2023    EGFR 10 01/29/2023    EGFR 9 01/26/2023    CREATININE 9 62 (H) 01/30/2023    CREATININE 5 40 (H) 01/29/2023    CREATININE 5 61 (H) 01/26/2023     UF 1/29    Plan:  Nephrology consulted; appreciate recs  HD 1/30    History of stroke  Assessment & Plan   Last MRI brain in 4/22: "Chronic lacunar infarct in the left ventral medial thalamus   Small, chronic infarct in the right parietal lobe"     Baseline, emotionally labile        Coronary artery disease involving native coronary artery of native heart without angina pectoris  Assessment & Plan  · Reviewed last cardiac catheterization  · Cont coreg  · Cont effient     Diabetic polyneuropathy associated with type 2 diabetes mellitus [facility])  Assessment & Plan  Lab Results   Component Value Date    HGBA1C 5 5 2022       Recent Labs     23  1806 23  2119 23  0729 23  1100   POCGLU 152* 168* 134 140       Blood Sugar Average: Last 72 hrs:  (P) 142 5   Resume home insulin regimen when pt able to eat  Home: Lantus 14 U daily          VTE Pharmacologic Prophylaxis: VTE Score: 5 High Risk (Score >/= 5) - Pharmacological DVT Prophylaxis Ordered: heparin drip  Sequential Compression Devices Ordered  Patient Centered Rounds: I performed bedside rounds with nursing staff today  Discussions with Specialists or Other Care Team Provider: IM, Nephrology, Cardiology, ICU    Education and Discussions with Family / Patient: Updated  (son and family members) at bedside  Current Length of Stay: 1 day(s)  Current Patient Status: Inpatient   Discharge Plan: Anticipate discharge in 48-72 hrs to discharge location to be determined pending rehab evaluations  Code Status: Level 1 - Full Code    Subjective:   Overnight reports of agitation requiring restraints, ativan x2 IM due to NPO status, QTc prolongation  No BiPAP overnight due to agitation  Initially seen on 4-5L NC  Notified by RN about increased work of breathing and agreed with assessment to put on BiPAP  On BiPAP since  Objective:     Vitals:   Temp (24hrs), Av 9 °F (37 7 °C), Min:98 5 °F (36 9 °C), Max:101 2 °F (38 4 °C)    Temp:  [98 5 °F (36 9 °C)-101 2 °F (38 4 °C)] 99 7 °F (37 6 °C)  HR:  [] 91  Resp:  [24-41] 25  BP: (114-137)/(57-99) 125/72  SpO2:  [93 %-100 %] 97 %  Body mass index is 32 78 kg/m²  Input and Output Summary (last 24 hours): Intake/Output Summary (Last 24 hours) at 2023 1541  Last data filed at 2023 1500  Gross per 24 hour   Intake 4041 3 ml   Output 353 ml   Net 3688 3 ml       Physical Exam:   Physical Exam  Vitals and nursing note reviewed  Constitutional:       General: He is not in acute distress  Appearance: He is well-developed  He is ill-appearing  HENT:      Head: Normocephalic and atraumatic  Cardiovascular:      Rate and Rhythm: Normal rate and regular rhythm  Pulses: Normal pulses  Heart sounds: Normal heart sounds  No murmur heard  Pulmonary:      Effort: Pulmonary effort is normal  No respiratory distress  Breath sounds: Normal breath sounds  Abdominal:      General: There is no distension  Palpations: Abdomen is soft  Tenderness: There is no abdominal tenderness  There is no guarding  Musculoskeletal:      Cervical back: Neck supple  Right lower leg: No edema  Left lower leg: No edema  Skin:     General: Skin is warm and dry  Capillary Refill: Capillary refill takes less than 2 seconds  Neurological:      Mental Status: He is disoriented            Additional Data:     Labs:  Results from last 7 days   Lab Units 01/30/23  1505 01/30/23  0458   WBC Thousand/uL  --  11 00*   HEMOGLOBIN g/dL  --  9 6*   I STAT HEMOGLOBIN g/dl 9 9*  --    HEMATOCRIT %  --  30 2*   HEMATOCRIT, ISTAT % 29*  --    PLATELETS Thousands/uL  --  148*   NEUTROS PCT %  --  75   LYMPHS PCT %  --  13*   MONOS PCT %  --  11   EOS PCT %  --  0     Results from last 7 days   Lab Units 01/30/23  1505 01/30/23  0458   SODIUM mmol/L  --  136   POTASSIUM mmol/L  --  5 2   CHLORIDE mmol/L  --  98   CO2 mmol/L  --  25   CO2, I-STAT mmol/L 25  --    BUN mg/dL  --  68*   CREATININE mg/dL  --  9 62*   ANION GAP mmol/L  --  13   CALCIUM mg/dL  --  8 8   ALBUMIN g/dL  --  4 0   TOTAL BILIRUBIN mg/dL  --  0 75   ALK PHOS U/L  --  60   ALT U/L  --  13   AST U/L  --  27   GLUCOSE RANDOM mg/dL  --  142*     Results from last 7 days   Lab Units 01/29/23  0821   INR  1 03     Results from last 7 days   Lab Units 01/30/23  1100 01/30/23  0729 01/29/23  2119 01/29/23  1806 01/29/23  1407 01/29/23  0842 01/26/23  1128 01/26/23  0692 01/25/23  2227   POC GLUCOSE mg/dl 140 134 168* 152* 122 139 125 105 127         Results from last 7 days   Lab Units 01/29/23  2114 01/29/23  0844 01/29/23  0821   LACTIC ACID mmol/L 1 2  --  1 1   PROCALCITONIN ng/ml  --  0 31*  --        Lines/Drains:  Invasive Devices     Peripheral Intravenous Line  Duration           Peripheral IV 01/29/23 Right Antecubital 1 day    Peripheral IV 01/29/23 Right Wrist 1 day          Line  Duration           Hemodialysis AV Fistula Left Upper arm -- days                  Telemetry:  Telemetry Orders (From admission, onward)             48 Hour Telemetry Monitoring  Continuous x 48 hours        References:    Telemetry Guidelines   Question:  Reason for 48 Hour Telemetry  Answer:  Acute MI, chest pain - R/O MI, or unstable angina                 Telemetry Reviewed: Normal Sinus Rhythm  Indication for Continued Telemetry Use: Acute respiratory failure on Bipap             Imaging: Reviewed radiology reports from this admission including: xray(s) and ECHO    Recent Cultures (last 7 days):   Results from last 7 days   Lab Units 01/29/23  0844 01/29/23  0821   BLOOD CULTURE  No Growth at 24 hrs  No Growth at 24 hrs         Last 24 Hours Medication List:   Current Facility-Administered Medications   Medication Dose Route Frequency Provider Last Rate   • aspirin  81 mg Oral Daily Rodolph Romeo     • atorvastatin  40 mg Oral Daily With Dinner Rodolph Romeo     • calcium acetate  667 mg Oral TID With Meals Rodolph Romeo     • carvedilol  12 5 mg Oral BID With Meals Rodolph Romeo     • chlorhexidine  15 mL Mouth/Throat Q12H [address] Rodolph Romeo     • heparin (porcine)  3-20 Units/kg/hr (Order-Specific) Intravenous Titrated Bill G Daniel 17 1 Units/kg/hr (01/30/23 1416)   • heparin (porcine)  2,000 Units Intravenous Q6H PRN Rodolph Romeo     • heparin (porcine)  4,000 Units Intravenous Q6H PRN Rodolph Romeo     • insulin glargine  14 Units Subcutaneous Daily Rodolph Romeo • insulin lispro  1-5 Units Subcutaneous TID AC Bill Sanchez     • insulin lispro  1-5 Units Subcutaneous HS Bill Sanchez     • ipratropium  0 5 mg Nebulization TID PRN Oleta Scottie     • levalbuterol  1 25 mg Nebulization TID PRN Oleta Scottie     • melatonin  3 mg Oral HS Bill Sanchez     • nitroglycerin  0 4 mg Sublingual Once Oleta Scottie     • prasugrel  5 mg Oral Daily Oleta Scottie     • torsemide  100 mg Oral Daily Oleta Scottie     • valproate sodium  250 mg Intravenous Q12H [facility] Oleta Scottie Stopped (01/30/23 1200)        Today, Patient Was Seen By: Dilan Valencia MD    **Please Note: This note may have been constructed using a voice recognition system  **

## 2023-01-30 NOTE — QUICK NOTE
Re evaluation 1/29/2023 at 2120    1  Chest pain with elevated troponin  - reports 30 min of chest pain earlier in the day  - EKG: RBBB, no new changes  - initial troponin 4037 --> 7138 --> 31917  Appears drawn prior to dialysis  - check random troponin now  - inflammatory markers pending  Reported viral illness last week  - start ACS heparin drip  - did discuss with cardiology - no further recs overnight  - continue home meds:  ASA, carvedilol, atorvastatin  - Recent A1c 5 5, lipid panel pending    2  SIRS  - POA with tachycardia, tachypnea  Lactate normal    - procalcitonin pending  - CXR without infiltrate   - COVID / flu negative  - T 101 2 at 1925, however, nurse reports immediate recheck was normal    - monitor off antibiotics  - trend temp curve, CBC, procal    3  Hx of frontal lobe stroke   - intermittent episodes with impulsiveness, behavioral issues since CVA  Noted to be "swinging" at nursing staff earlier in the day  Non compliant with fall precautions  - may require restraints overnight      Physical Exam:   Physical Exam  Constitutional:       General: He is not in acute distress  Appearance: Normal appearance  He is not ill-appearing  HENT:      Head: Normocephalic and atraumatic  Right Ear: External ear normal       Left Ear: External ear normal       Nose: Nose normal    Eyes:      General: No scleral icterus  Extraocular Movements: Extraocular movements intact  Conjunctiva/sclera: Conjunctivae normal    Cardiovascular:      Rate and Rhythm: Normal rate and regular rhythm  Pulses: Normal pulses  Heart sounds: Normal heart sounds  Pulmonary:      Effort: Pulmonary effort is normal  No respiratory distress  Breath sounds: Normal breath sounds  Comments: Nasal cannula  Abdominal:      General: Bowel sounds are normal  There is no distension  Palpations: Abdomen is soft  Tenderness: There is no abdominal tenderness   There is no guarding or rebound  Neurological:      General: No focal deficit present  Mental Status: He is alert and oriented to person, place, and time  Psychiatric:         Judgment: Judgment is impulsive (at times) and inappropriate (at times)

## 2023-01-30 NOTE — ASSESSMENT & PLAN NOTE
Lab Results   Component Value Date    HGBA1C 5 5 12/23/2022       Recent Labs     01/29/23  1806 01/29/23  2119 01/30/23  0729 01/30/23  1100   POCGLU 152* 168* 134 140       Blood Sugar Average: Last 72 hrs:  (P) 142 5   Known history of Type 2 Diabetes Mellitus  • Home regimen of:  Insulin  Plan:  • Lantus 14 Units Daily, Lispro 4 Units TID with Meals  • Sliding Scale  • Glucose Checks and Insulin Correction ACHS  • Goal -180 while admitted, adjusting insulin regimen as appropriate  • Hypoglycemia monitoring and treat with protocol

## 2023-01-30 NOTE — CONSULTS
Consultation - Cardiology Team One  Howard Ellsworth 58 y o  male MRN: [id number]  Unit/Bed#: ICU 06 Encounter: [id number]    Inpatient consult to Cardiology  Consult performed by: Sunny Sidhu MD  Consult ordered by: Prisca Doe [address] [place] [address]          Physician Requesting Consult: Will Pedro MD  Reason for Consult / Principal Problem: Elevated Trop      Assessment/ Plan  Significantly elevated troponins may be secondary to fluid overload, viral pericarditis, or recent infarction  Current ischemic event less likely given multiple week history of presenting symptoms (weakness, malaise)  Patient is not currently a good candidate for cardiac catheterization given comorbidities, current respiratory distress  Plan:   Fluid reduction via hemodialysis as per nephrology  Continue respiratory support as needed  Reevaluate when euvolemic  History of Present Illness   HPI: Howard Ellsworth is a 58y o  year old male who has a history of CVA (basal ganglia stroke left MCA 2018), DM, ESRD with MWF schedule, HTN, HLD who follows lcoally with cardiologist Dr Hector Hearn  He has in recent months been seeing Dr Destinee Akhtar at [facility]      He presents with shortness of breath, nausea, weakness  When I interviewed family patient was mostly unconscious and on BiPAP  Family reports recent multiple week history of growing unease and sense of illness  On 1/26/2023 patient was held for [facility] for bilateral ear pain likely due to a viral illness  Per family's insights this is part of a broader increase in patient illness  He also presented to the hospital 1/14, 1/09  Family reports that on Saturday patient was at his baseline, ambulating, interactive    In the evening he went to bed around 830 and woke up again at 9 when he stated that he, "needs to go to the hospital "  Family advised patient to return to bed and in the morning when he continued to feel bad, with chest pain and pale affect he was brought to the hospital     Family notes that while they are careful to monitor his fluid intake, that he drinks water when no one is watching  They estimate he had at least 3 cups of water outside of the monitoring  Testing demonstrated rising troponins, elevated BNP  He is admitted for evaluation, cardiology is consulted for management  Patient has extensive coronary artery disease with multiple stents placed in the last 12 months  Patient has reduced ejection fraction (30%) compared to prior echocardiogram in May of this year (65%)  Cath History  5/2/2022 [facility]-cardiac Cath Lab: LAD-50% occlusion, LCx-50% occlusion, first obtuse marginal artery off of circumflex was 95% occluded and is now s/p ADE, RCA with 50% occlusion, right posterior AV artery with 90% occlusion             6/9/2022 [facility]- cardiac Cath Lab: dist RCA 80%, RPDA-80%, RPAV-99%, Successful PCI to LAD & mid Cx: prox LAD 70% s/p ADE, mid LAD 80% s/p ADE, mid Cx 70%    6/27/2022 [facility]-cardiac Cath Lab: Stents patent, LAD 70% stenosed w/ non-occlusive thrombus s/p successful thrombectomy    8/5/2022 [facility]-cardiac Cath Lab: Successful balloon angioplasty of in-stent thrombus in LAD, mCx patent, 1st marginal lesion-70%, distal RCA-70%, RPDA-80%, prox-mid LAD- 70%          EKG reviewed personally:   Sinus rhythm  Tachycardia  Wide QRS complex (>120ms)  DC interval less than 200 ms  ST elevations apparent anterior lateral leads  RBBB evident in V1  No significant changes from 1/6/2023      Telemetry reviewed personally: Sinus Rhythm average HR 95 bpm   No events      ROS  Historical Information   Past Medical History:   Diagnosis Date   • Cerebrovascular accident (CVA) due to thrombosis of left middle cerebral artery ([facility] [address] ) 7/29/2018   • Chronic kidney disease    • Diabetes mellitus ([address] )    • GERD (gastroesophageal reflux disease)    • Hypercholesteremia    • Hyperlipidemia    • Hypertension    • 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   • Neuropathy    • Obesity    • Osteomyelitis ([address] )     last assessed 11/4/16   • PVC's (premature ventricular contractions)     sees cardiology Dr Shaun camargo   • Stroke [facility])     last weeof July 2018 [address] [facility]   • TIA (transient ischemic attack) 10/28/2018     Past Surgical History:   Procedure Laterality Date   • ABDOMINAL SURGERY     • CARDIAC CATHETERIZATION N/A 5/2/2022    Procedure: Cardiac Coronary Angiogram;  Surgeon: Jelly Whitten MD;  Location: [facility] CARDIAC CATH LAB; Service: Cardiology   • CARDIAC CATHETERIZATION N/A 5/2/2022    Procedure: Cardiac pci;  Surgeon: Jelly Whitten MD;  Location: [facility] CARDIAC CATH LAB; Service: Cardiology   • CHOLECYSTECTOMY      Percutaneous   • COLONOSCOPY     • CYSTOSCOPY     • OTHER SURGICAL HISTORY      "stimulator to control bowel movements"   • [place] ESOPHAGOGASTRODUODENOSCOPY TRANSORAL DIAGNOSTIC N/A 9/27/2016    Procedure: ESOPHAGOGASTRODUODENOSCOPY (EGD); Surgeon: Alessio Hartman MD;  Location: [facility] GI LAB;   Service: Gastroenterology   • [place] LAPAROSCOPY SURG CHOLECYSTECTOMY N/A 2/29/2016    Procedure: LAPAROSCOPIC CHOLECYSTECTOMY ;  Surgeon: Ravinder Kidd DO;  Location: [facility] Main OR;  Service: General   • ROTATOR CUFF REPAIR Right    • TOE AMPUTATION Right 10/28/2016    Procedure: 3RD TOE AMPUTATION ;  Surgeon: Leander Garduno DPM;  Location: [facility] Main OR;  Service:      Social History     Substance and Sexual Activity   Alcohol Use Not Currently     Social History     Substance and Sexual Activity   Drug Use No     Social History     Tobacco Use   Smoking Status Never   Smokeless Tobacco Never     Family History:   Family History   Problem Relation Age of Onset   • Leukemia Mother    • Liver disease Mother    • Lung cancer Mother         heavy smoker - 3 ppd   • Heart disease Father    • Liver disease Father    • Multiple myeloma Sister    • Breast cancer Sister    • Urolithiasis Family    • Alcohol abuse Neg Hx    • Depression Neg Hx    • Drug abuse Neg Hx    • Substance Abuse Neg Hx    • Mental illness Neg Hx        Meds/Allergies   all current active meds have been reviewed  heparin (porcine), 3-20 Units/kg/hr (Order-Specific), Last Rate: 13 1 Units/kg/hr (01/30/23 0701)        No Known Allergies    Objective   Vitals: Blood pressure 114/57, pulse 99, temperature 100 2 °F (37 9 °C), temperature source Axillary, resp  rate (!) 27, height 5' 9" (1 753 m), weight 101 kg (222 lb), SpO2 95 %  , Body mass index is 32 78 kg/m²  ,     Systolic (94HCQ), DQT:421 , Min:114 , HIP:020     Diastolic (01VYV), SJJ:13, Min:56, Max:99        Intake/Output Summary (Last 24 hours) at 1/30/2023 0926  Last data filed at 1/30/2023 0600  Gross per 24 hour   Intake 4292 33 ml   Output 253 ml   Net 4039 33 ml     Wt Readings from Last 3 Encounters:   01/30/23 101 kg (222 lb)   01/26/23 100 kg (221 lb)   01/13/23 103 kg (228 lb)     Invasive Devices     Peripheral Intravenous Line  Duration           Peripheral IV 01/29/23 Right Antecubital 1 day    Peripheral IV 01/29/23 Right Wrist 1 day          Line  Duration           Hemodialysis AV Fistula Left Upper arm -- days                Physical Exam    LABORATORY RESULTS:      CBC with diff:   Results from last 7 days   Lab Units 01/30/23  0458 01/29/23  1415 01/29/23  0821 01/26/23  0453 01/25/23  1958   WBC Thousand/uL 11 00*  --  11 83* 5 48 5 62   HEMOGLOBIN g/dL 9 6*  --  10 9* 10 1* 10 1*   HEMATOCRIT % 30 2*  --  35 6* 31 4* 31 5*   MCV fL 97  --  98 95 94   PLATELETS Thousands/uL 148* 172 181 140* 129*   MCH pg 30 8  --  29 9 30 7 30 1   MCHC g/dL 31 8  --  30 6* 32 2 32 1   RDW % 15 6*  --  15 2* 15 0 15 0   MPV fL 10 6 9 8 10 3 10 3 10 0   NRBC AUTO /100 WBCs 0  --  0  --  0       CMP:  Results from last 7 days   Lab Units 01/30/23  0458 01/29/23  0821 01/26/23  0453 01/25/23  1958   POTASSIUM mmol/L 5 2 3 5 4 4 3 6   CHLORIDE mmol/L 98 114* 97 93*   CO2 mmol/L 25 20* 32 33*   BUN mg/dL 68* 36* 29* 23   CREATININE mg/dL 9 62* 5 40* 5 61* 4 50*   CALCIUM mg/dL 8 8 6 0* 9 1 9 4   AST U/L 27 13  --  11*   ALT U/L 13 7  --  10   ALK PHOS U/L 60 51  --  81   EGFR ml/min/1 73sq m 5 10 9 13       BMP:  Results from last 7 days   Lab Units 23  0458 23  0821 23  0453 23  1958   POTASSIUM mmol/L 5 2 3 5 4 4 3 6   CHLORIDE mmol/L 98 114* 97 93*   CO2 mmol/L 25 20* 32 33*   BUN mg/dL 68* 36* 29* 23   CREATININE mg/dL 9 62* 5 40* 5 61* 4 50*   CALCIUM mg/dL 8 8 6 0* 9 1 9 4       Lab Results   Component Value Date    CREATININE 9 62 (H) 2023    CREATININE 5 40 (H) 2023    CREATININE 5 61 (H) 2023       Lab Results   Component Value Date    NTBNP 1,859 (H) 2022    NTBNP 1,259 (H) 2020    NTBNP 1,579 (H) 2020          Results from last 7 days   Lab Units 23  0821   MAGNESIUM mg/dL 1 5*                 Results from last 7 days   Lab Units 23  0821   TSH 3RD GENERATON uIU/mL 1 966       Results from last 7 days   Lab Units 23  0821   INR  1 03     Lipid Profile:   Lab Results   Component Value Date    CHOL 203 (H) 08/10/2016     Lab Results   Component Value Date    HDL 30 (L) 2023    HDL 33 (L) 2022    HDL 31 (L) 2022     Lab Results   Component Value Date    LDLCALC 21 2023    LDLCALC 19 2022    LDLCALC 25 2022     Lab Results   Component Value Date    TRIG 123 2023    TRIG 151 (H) 2022    TRIG 182 (H) 2022       Cardiac testing:   Results for orders placed during the hospital encounter of 10/05/20    Echo complete with contrast if indicated    Narrative  [facility] [address], [address] [facility]  [phone]    Transthoracic Echocardiogram  2D, M-mode, Doppler, and Color Doppler    Study date:  06-Oct-2020    Patient: Gege Theodore  MR number: [id number]  Account number: [de-identified]  : 1960  Age: 61 years  Gender: Male  Status: Inpatient  Location: Bedside  Height: 70 in  Weight: 239 6 lb  BP: 165/ 80 mmHg    Indications: Shortness of breath  Diagnoses: R06 02 - Shortness of breath    Sonographer:  Alex Hamilton RDCS  Primary Physician:  Esteban Cristobal DO  Referring Physician:  Jaimie Vilelda MD  Group:  [facility]  Interpreting Physician:  Anel Hale MD    SUMMARY    LEFT VENTRICLE:  Systolic function was normal by visual assessment  Ejection fraction was estimated to be 60 %  There were no regional wall motion abnormalities  Wall thickness was moderately increased  Features were consistent with a pseudonormal left ventricular filling pattern, with concomitant abnormal relaxation and increased filling pressure (grade 2 diastolic dysfunction)  LEFT ATRIUM:  The atrium was mildly dilated  RIGHT ATRIUM:  The atrium was mildly dilated  MITRAL VALVE:  There was moderate annular calcification  There was mild regurgitation  AORTIC VALVE:  The valve was trileaflet  Leaflets exhibited normal thickness, moderate calcification, and moderately reduced cuspal separation  Transaortic velocity was increased due to valvular stenosis  There was mild to moderate stenosis  There was mild regurgitation  TRICUSPID VALVE:  There was trace regurgitation  PULMONIC VALVE:  There was mild regurgitation  HISTORY: PRIOR HISTORY: DM2  CKD4  Hypertension  CVA  GERD  Dementia  PROCEDURE: The procedure was performed at the bedside  This was a routine study  The transthoracic approach was used  The study included complete 2D imaging, M-mode, complete spectral Doppler, and color Doppler  The heart rate was 98 bpm,  at the start of the study  Image quality was adequate  LEFT VENTRICLE: Size was normal  Systolic function was normal by visual assessment  Ejection fraction was estimated to be 60 %  There were no regional wall motion abnormalities  Wall thickness was moderately increased   DOPPLER: The ratio  of early ventricular filling to atrial contraction velocities was within the normal range  Features were consistent with a pseudonormal left ventricular filling pattern, with concomitant abnormal relaxation and increased filling pressure  (grade 2 diastolic dysfunction)  RIGHT VENTRICLE: The size was normal  Systolic function was normal  DOPPLER: Systolic pressure was not estimated  LEFT ATRIUM: The atrium was mildly dilated  RIGHT ATRIUM: The atrium was mildly dilated  MITRAL VALVE: There was moderate annular calcification  Valve structure was normal  There was normal leaflet separation  DOPPLER: The transmitral velocity was within the normal range  There was no evidence for stenosis  There was mild  regurgitation  AORTIC VALVE: The valve was trileaflet  Leaflets exhibited normal thickness, moderate calcification, and moderately reduced cuspal separation  DOPPLER: Transaortic velocity was increased due to valvular stenosis  There was mild to moderate  stenosis  There was mild regurgitation  TRICUSPID VALVE: The valve structure was normal  There was normal leaflet separation  DOPPLER: The transtricuspid velocity was within the normal range  There was no evidence for stenosis  There was trace regurgitation  PULMONIC VALVE: Leaflets exhibited normal thickness, no calcification, and normal cuspal separation  DOPPLER: The transpulmonic velocity was within the normal range  There was mild regurgitation  PERICARDIUM: There was no pericardial effusion  AORTA: The root exhibited normal size  SYSTEMIC VEINS: IVC: The inferior vena cava was normal in size and course   Respirophasic changes were normal     SYSTEM MEASUREMENT TABLES    2D  %FS: 36 09 %  Ao Diam: 3 8 cm  EDV(Teich): 182 77 ml  EF(Teich): 64 8 %  ESV(Teich): 64 33 ml  IVSd: 1 57 cm  LA Area: 24 31 cm2  LA Diam: 4 63 cm  LVEDV MOD A4C: 192 34 ml  LVEF MOD A4C: 65 32 %  LVESV MOD A4C: 66 7 ml  LVIDd: 6 04 cm  LVIDs: 3 86 cm  LVLd A4C: 9 02 cm  LVLs A4C: 7 15 cm  LVOT Diam: 2 21 cm  LVPWd: 1 61 cm  RA Area: 17 56 cm2  RVIDd: 4 cm  SV MOD A4C: 125 64 ml  SV(Teich): 118 44 ml    CW  AV Env  Ti: 330 16 ms  AV MaxP 64 mmHg  AV VTI: 57 9 cm  AV Vmax: 2 58 m/s  AV Vmean: 1 76 m/s  AV meanP 38 mmHg  TR MaxP 37 mmHg  TR Vmax: 2 71 m/s    MM  TAPSE: 1 84 cm    PW  FATUMA (VTI): 1 27 cm2  FATUMA Vmax: 1 42 cm2  AVAI (VTI): 0 cm2/m2  AVAI Vmax: 0 cm2/m2  E' Sept: 0 07 m/s  E/E' Sept: 12 69  LVOT Env  Ti: 335 87 ms  LVOT VTI: 19 11 cm  LVOT Vmax: 0 95 m/s  LVOT Vmean: 0 57 m/s  LVOT maxPG: 3 63 mmHg  LVOT meanP 61 mmHg  LVSI Dopp: 32 42 ml/m2  LVSV Dopp: 73 27 ml  MV A Carlin: 0 64 m/s  MV Dec Dillon: 4 7 m/s2  MV DecT: 189 67 ms  MV E Carlin: 0 89 m/s  MV E/A Ratio: 1 38  MV PHT: 55 01 ms  MVA By PHT: 4 cm2    [address] Accredited Echocardiography Laboratory    Prepared and electronically signed by    Nadeen Wolff MD  Signed 06-Oct-2020 16:31:49    No results found for this or any previous visit  No valid procedures specified  No results found for this or any previous visit  Imaging: I have personally reviewed pertinent reports  XR chest 1 view portable    Result Date: 2023  Narrative: CHEST INDICATION:   SOB  COMPARISON:  CXR 2023 and chest CT 2023  EXAM PERFORMED/VIEWS:  XR CHEST PORTABLE FINDINGS: Cardiomediastinal silhouette appears unremarkable  Moderate pulmonary edema with trace effusions  Osseous structures appear within normal limits for patient age  Impression: Moderate pulmonary edema with trace effusions  Workstation performed: [id number]     XR chest 1 view portable    Result Date: 2023  Narrative: CHEST INDICATION:   Weakness  Dizziness and headaches  COMPARISON:  CXR 2023 and 2023, chest CT 2023  EXAM PERFORMED/VIEWS:  XR CHEST PORTABLE FINDINGS: Cardiomediastinal silhouette appears unremarkable  Mild pulmonary venous congestion  No effusion or pneumothorax   Osseous structures appear within normal limits for patient age  Impression: Mild pulmonary venous congestion  Workstation performed: [id number]     XR chest 1 view portable    Result Date: 1/9/2023  Narrative: CHEST INDICATION:   cp/sob  COMPARISON:  1/5/2023 EXAM PERFORMED/VIEWS:  XR CHEST PORTABLE FINDINGS: Heart shadow is mildly enlarged but unchanged from prior exam  Mild/moderate vascular accentuation  No pneumothorax or pleural effusion  Osseous structures appear within normal limits for patient age  Impression: Mild cardiomegaly  Mild/moderate vascular congestion Findings concur with the preliminary report by the referring clinician already in PACS and/or our electronic record EPIC  Workstation performed: [id number]     XR chest 1 view portable    Result Date: 1/5/2023  Narrative: CHEST INDICATION:   sob  COMPARISON:  10/24/2022 EXAM PERFORMED/VIEWS:  XR CHEST PORTABLE FINDINGS: Cardiomediastinal silhouette appears unremarkable  The lungs are clear  There is moderate pulmonary vascular congestion  No pneumothorax or pleural effusion  Osseous structures appear within normal limits for patient age  Impression: Moderate pulmonary vascular congestion  This report is in agreement with the preliminary interpretation  Workstation performed: [id number]     CT head without contrast    Result Date: 1/5/2023  Narrative: CT BRAIN - WITHOUT CONTRAST INDICATION:   Delirium ams  COMPARISON:  November 12, 2022 TECHNIQUE:  CT examination of the brain was performed  In addition to axial images, sagittal and coronal 2D reformatted images were created and submitted for interpretation  Radiation dose length product (DLP) for this visit:  1060 mGy-cm   This examination, like all CT scans performed in the [facility], was performed utilizing techniques to minimize radiation dose exposure, including the use of iterative reconstruction and automated exposure control  IMAGE QUALITY:  Diagnostic   FINDINGS: PARENCHYMA: Decreased attenuation is noted in periventricular and subcortical white matter demonstrating an appearance that is statistically most likely to represent mild microangiopathic change  Old lacunar infarcts left basal ganglion  No CT signs of acute infarction  No intracranial mass, mass effect or midline shift  No acute parenchymal hemorrhage  Arterial atherosclerosis  VENTRICLES AND EXTRA-AXIAL SPACES:  Normal for the patient's age  VISUALIZED ORBITS AND PARANASAL SINUSES:  Normal visualized orbits  Mild mucosal thickening of the visualized paranasal sinuses  CALVARIUM AND EXTRACRANIAL SOFT TISSUES:  Normal      Impression: No acute intracranial abnormality  Workstation performed: [id number]     CTA ED chest PE study    Result Date: 1/9/2023  Narrative: CTA - CHEST WITH IV CONTRAST - PULMONARY ANGIOGRAM INDICATION:   cp, sob, recent hospitilization  COMPARISON: None  TECHNIQUE: CTA examination of the chest was performed using angiographic technique according to a protocol specifically tailored to evaluate for pulmonary embolism  Axial, sagittal, and coronal 2D reformatted images were created from the source data and  submitted for interpretation  In addition, coronal 3D MIP postprocessing was performed on the acquisition scanner  Radiation dose length product (DLP) for this visit:  495 mGy-cm   This examination, like all CT scans performed in the [facility], was performed utilizing techniques to minimize radiation dose exposure, including the use of iterative reconstruction and automated exposure control  IV Contrast:  85 mL of iohexol (OMNIPAQUE)  FINDINGS: PULMONARY ARTERIAL TREE:  No pulmonary embolus is seen  LUNGS:  Patchy groundglass opacities likely related to the expiratory nature of the imaging  Scarring versus subsegmental atelectasis right posterior costophrenic angle  There is no tracheal or endobronchial lesion  PLEURA:  Unremarkable   HEART/GREAT VESSELS:  Extensive coronary artery calcification is noted  Aortic valve calcifications  Heart is otherwise unremarkable  No thoracic aortic aneurysm  No thoracic aortic aneurysm  MEDIASTINUM AND LAZARUS:  Unremarkable  CHEST WALL AND LOWER NECK:   Bilateral gynecomastia  VISUALIZED STRUCTURES IN THE UPPER ABDOMEN:  Extensive splenic artery calcifications  Status post cholecystectomy  OSSEOUS STRUCTURES:  Spinal degenerative changes are noted  No acute fracture or destructive osseous lesion  Impression: No pulmonary embolism  Scarring versus subsegmental atelectasis in the right costophrenic angle  Workstation performed: [id number]       Assessment  Principal Problem:    Volume overload  Active Problems:    ESRD on dialysis [facility])    Diabetic polyneuropathy associated with type 2 diabetes mellitus ([facility] [address] )    History of stroke    Elevated troponin with chest pain    Coronary artery disease involving native coronary artery of native heart without angina pectoris      Thank you for allowing us to participate in this patient's care  This pt will follow up with          once discharged  Counseling / Coordination of Care  Total floor / unit time spent today 45 minutes  Greater than 50% of total time was spent with the patient and / or family counseling and / or coordination of care  A description of the counseling / coordination of care: Review of history, current assessment, development of a plan  Code Status: Level 1 - Full Code    ** Please Note: Dragon 360 Dictation voice to text software may have been used in the creation of this document   **

## 2023-01-30 NOTE — NURSING NOTE
Patient completed dialysis treatment  Respirations in 20's and decrease in work of breathing  Patient off bipap at this time and on 4L nasal cannula, saturations at 98  Resting comfortably  Family at bedside  No concerns at this time

## 2023-01-30 NOTE — NURSING NOTE
Patient attempted to get out of chair and bed multiple times despite redirection  Patient attempting to leave room to get water despite being educated on his fluid restriction  When patient was told he could not have more water he became verbally and physically aggressive as well as screaming profanities  Security was called to assist placing the patient in restraints  The provider was notified of the patients condition, and medication was ordered and administered

## 2023-01-30 NOTE — ASSESSMENT & PLAN NOTE
Lab Results   Component Value Date    EGFR 5 01/30/2023    EGFR 10 01/29/2023    EGFR 9 01/26/2023    CREATININE 9 62 (H) 01/30/2023    CREATININE 5 40 (H) 01/29/2023    CREATININE 5 61 (H) 01/26/2023   ·   · History of ESRD on HD MWF  · Has been noncompliant with fluid restrictions at home per wife  · UF treatment performed on 1/29 with removal of 2 5L  · Volume overloaded on exam  Plan:  · Nephro  · Daily Weights  · Monitor I/Os  · Fluid restriction 1-1 5L/d  · Renal Diet

## 2023-01-30 NOTE — NURSING NOTE
Patient still on continuous bipap with respirations in the 30's with use of accessory abdominal muscles  Dr Kendell Love aware

## 2023-01-30 NOTE — PLAN OF CARE
Problem: METABOLIC, FLUID AND ELECTROLYTES - ADULT  Goal: Electrolytes maintained within normal limits  Description: INTERVENTIONS:  - Monitor labs and assess patient for signs and symptoms of electrolyte imbalances  - Administer electrolyte replacement as ordered  - Monitor response to electrolyte replacements, including repeat lab results as appropriate  - Instruct patient on fluid and nutrition as appropriate  Outcome: Progressing  Goal: Fluid balance maintained  Description: INTERVENTIONS:  - Monitor labs   - Monitor I/O and WT  - Instruct patient on fluid and nutrition as appropriate  - Assess for signs & symptoms of volume excess or deficit  Outcome: Progressing     Problem: MOBILITY - ADULT  Goal: Maintain or return to baseline ADL function  Description: INTERVENTIONS:  -  Assess patient's ability to carry out ADLs; assess patient's baseline for ADL function and identify physical deficits which impact ability to perform ADLs (bathing, care of mouth/teeth, toileting, grooming, dressing, etc )  - Assess/evaluate cause of self-care deficits   - Assess range of motion  - Assess patient's mobility; develop plan if impaired  - Assess patient's need for assistive devices and provide as appropriate  - Encourage maximum independence but intervene and supervise when necessary  - Involve family in performance of ADLs  - Assess for home care needs following discharge   - Consider OT consult to assist with ADL evaluation and planning for discharge  - Provide patient education as appropriate  Outcome: Progressing  Goal: Maintains/Returns to pre admission functional level  Description: INTERVENTIONS:  - Perform BMAT or MOVE assessment daily    - Set and communicate daily mobility goal to care team and patient/family/caregiver     - Collaborate with rehabilitation services on mobility goals if consulted  - Record patient progress and toleration of activity level   Outcome: Progressing     Problem: Prexisting or High Potential for Compromised Skin Integrity  Goal: Skin integrity is maintained or improved  Description: INTERVENTIONS:  - Identify patients at risk for skin breakdown  - Assess and monitor skin integrity  - Assess and monitor nutrition and hydration status  - Monitor labs   - Assess for incontinence   - Turn and reposition patient  - Assist with mobility/ambulation  - Relieve pressure over bony prominences  - Avoid friction and shearing  - Provide appropriate hygiene as needed including keeping skin clean and dry  - Evaluate need for skin moisturizer/barrier cream  - Collaborate with interdisciplinary team   - Patient/family teaching  - Consider wound care consult   Outcome: Progressing     Problem: SAFETY,RESTRAINT: NV/NON-SELF DESTRUCTIVE BEHAVIOR  Goal: Remains free of harm/injury (restraint for non violent/non self-detsructive behavior)  Description: INTERVENTIONS:  - Instruct patient/family regarding restraint use   - Assess and monitor physiologic and psychological status   - Provide interventions and comfort measures to meet assessed patient needs   - Identify and implement measures to help patient regain control  - Assess readiness for release of restraint   Outcome: Progressing  Goal: Returns to optimal restraint-free functioning  Description: INTERVENTIONS:  - Assess the patient's behavior and symptoms that indicate continued need for restraint  - Identify and implement measures to help patient regain control  - Assess readiness for release of restraint   Outcome: Progressing

## 2023-01-30 NOTE — PROGRESS NOTES
NEPHROLOGY PROGRESS NOTE   Buddy Ortiz 58 y o  male MRN: [id number]  Unit/Bed#: ICU 06 Encounter: [id number]  Reason for Consult: ESRD on HD    66-year-old male with ESRD on HD MWF, obesity, DM2, CAD, GERD presents with dyspnea  Nephrology consulted for management of ESRD/hemodialysis    ASSESSMENT/PLAN:  [place] [place]  -last treatment was extra 2hr UF treatment, UF 2 5L  -plan for hemodialysis today  Continue to challenge  -EDW: 103 2 kg  Today's weight 101 kg  -next treatment 2/1/2023  Will plan to continue regular Monday, Wednesday, Friday schedule during hospitalization with extra UF treatments as required  -treatment plan d/w Dr Danay Loyd by Dr Mariely Wiener via [facility] Text  Discussed current plan to continue to attempt to remove more volume with HD  All agreeable with plan  -will d/w Dr Mariely Weiner    Access: LUE AVF  -+thrill/bruit   -site clear  -continue to use for hemodialysis access    HTN/volume status:  -BP acceptable  -clinically, examines mildly hypervolemic  -maintain goal BP <140/90  -home medications: Carvedilol 12 5 mg twice daily, torsemide 50 mg daily  -current medications: Carvedilol 12 5 mg twice daily, torsemide 100 mg daily  -continue UF with dialysis as BP tolerates  Continue to challenge with dialysis  -continue to monitor    Electrolytes/Acid base:  -stable  -will manage through dialysis  -continue to monitor    Anemia in CKD:  -Hbg 9 6    Goal Hgb >10  -on venofer 50 mg IV Q 2 weeks as outpatient  -continue to monitor  -transfuse for Hgb <7 0  -Management per primary team    Bone mineral disease of renal origin:  -Hyperphosphatemia: On PhosLo 667 mg 3 times daily with meals   -Secondary hyperparathyroidism: Outpatient dialysis medication record with Sensipar 150 mg 3 times a week with HD  -continue to monitor phosphorus, PTH, calcium, magnesium as outpatient    Acute hypoxic respiratory failure:  - In the setting of pulmonary edema  -stable  -s/p extra 2-hour UF treatment 1/29 with UF 2 5L  - Continue UF with dialysis as tolerated today  - Management per primary team    CAD:  -hx multiple prior interventions  -Repeat echo done this AM   Preliminary EF 30% but poor study  Final report pending  -Management per cardiology    Hx CVA:  -With associated behavioral changes  -Currently in restraints  - Management per primary team      SUBJECTIVE:  Patient awake, alert  Currently in restraints  Periods of agitation and aggression requiring one-to-one and restraints  Patient's son at bedside  S/p extra UF treatment 1/29/22, UF 2 5L  VSS    A complete review of systems was performed  Pertinent positives and negatives noted in the HPI  Otherwise the review of systems was negative  OBJECTIVE:  Current Weight: Weight - Scale: 101 kg (222 lb)  Vitals:    01/30/23 0700 01/30/23 0735 01/30/23 0912 01/30/23 0933   BP: 133/74 133/74 114/57 114/57   BP Location: Right arm   Right arm   Pulse: 95 95 99 88   Resp: (!) 27   (!) 30   Temp: 100 2 °F (37 9 °C)   99 9 °F (37 7 °C)   TempSrc: Axillary   Axillary   SpO2: 95%   98%   Weight:  101 kg (222 lb)     Height:  5' 9" (1 753 m)         Intake/Output Summary (Last 24 hours) at 1/30/2023 1128  Last data filed at 1/30/2023 0800  Gross per 24 hour   Intake 4314 1 ml   Output 253 ml   Net 4061 1 ml     General:  Awake, alert, appears comfortable and in no acute distress  Nontoxic  Skin:  No rash, warm, good skin turgor   Eyes:  PERRL, EOMI, sclerae nonicteric   no periorbital edema   ENT:  Moist mucous membranes  Neck:  Trachea midline, symmetric  No JVD  Chest: + bibasilar crackles  CVS:  Regular rate and rhythm without murmur, gallop or rub  S1 and S2 identified and normal   No S3, S4    Abdomen:  Soft, nontender, nondistended without masses  Normal bowel sounds x 4 quadrants  No bruit  Extremities:  Warm, pink, motor and sensory intact and well perfused  No cyanosis, pallor  No BLE edema  Neuro:  Awake, alert, oriented x3    Grossly intact  Psych: Appropriate affect  Mentating appropriately  Normal mental status exam  Access: LUE AVF with +thrill, bruit  Site clear without erythema, induration, hematoma        Medications:    Current Facility-Administered Medications:   •  aspirin (ECOTRIN LOW STRENGTH) EC tablet 81 mg, 81 mg, Oral, Daily, Sarahi Corea MD  •  atorvastatin (LIPITOR) tablet 40 mg, 40 mg, Oral, Daily With Ashley Matt MD  •  calcium acetate (PHOSLO) capsule 667 mg, 667 mg, Oral, TID With Meals, Sarahi Corea MD  •  carvedilol (COREG) tablet 12 5 mg, 12 5 mg, Oral, BID With Meals, Sarahi Corea MD  •  heparin (porcine) 25,000 units in 0 45% NaCl 250 mL infusion (premix), 3-20 Units/kg/hr (Order-Specific), Intravenous, Titrated, Ignacio Boyd, CRNP, Last Rate: 11 8 mL/hr at 01/30/23 0701, 13 1 Units/kg/hr at 01/30/23 0701  •  heparin (porcine) injection 2,000 Units, 2,000 Units, Intravenous, Q6H PRN, Ignacio Boyd, CRNP, 2,000 Units at 01/30/23 0701  •  heparin (porcine) injection 4,000 Units, 4,000 Units, Intravenous, Q6H PRN, Ignacio Boyd, CRNP  •  insulin glargine (LANTUS) subcutaneous injection 14 Units 0 14 mL, 14 Units, Subcutaneous, Daily, Sarahi Corea MD  •  insulin lispro (HumaLOG) 100 units/mL subcutaneous injection 1-5 Units, 1-5 Units, Subcutaneous, TID AC **AND** Fingerstick Glucose (POCT), , , TID AC, Sarahi Corea MD  •  insulin lispro (HumaLOG) 100 units/mL subcutaneous injection 1-5 Units, 1-5 Units, Subcutaneous, HS, Sarahi Corea MD, 1 Units at 01/29/23 2123  •  melatonin tablet 3 mg, 3 mg, Oral, HS, Ignacio Boyd, CRNP, 3 mg at 01/29/23 2247  •  nitroglycerin (NITROSTAT) SL tablet 0 4 mg, 0 4 mg, Sublingual, Once, Sarahi Corea MD  •  prasugrel (EFFIENT) tablet 5 mg, 5 mg, Oral, Daily, Sarahi Corea MD  •  torsemide [facility]) tablet 100 mg, 100 mg, Oral, Daily, Evette Watson MD  •  valproate (DEPACON) 250 mg in sodium chloride 0 9 % 50 mL IVPB, 250 mg, Intravenous, Q12H [address], BRITTANY Jain, Last Rate: 50 mL/hr at 01/30/23 0945, 250 mg at 01/30/23 0945    Laboratory Results:  Results from last 7 days   Lab Units 01/30/23  0458 01/29/23  1415 01/29/23  0821 01/26/23  0453 01/25/23 1958   WBC Thousand/uL 11 00*  --  11 83* 5 48 5 62   HEMOGLOBIN g/dL 9 6*  --  10 9* 10 1* 10 1*   HEMATOCRIT % 30 2*  --  35 6* 31 4* 31 5*   PLATELETS Thousands/uL 148* 172 181 140* 129*   SODIUM mmol/L 136  --  141 138 136   POTASSIUM mmol/L 5 2  --  3 5 4 4 3 6   CHLORIDE mmol/L 98  --  114* 97 93*   CO2 mmol/L 25  --  20* 32 33*   BUN mg/dL 68*  --  36* 29* 23   CREATININE mg/dL 9 62*  --  5 40* 5 61* 4 50*   CALCIUM mg/dL 8 8  --  6 0* 9 1 9 4   MAGNESIUM mg/dL  --   --  1 5*  --   --    PHOSPHORUS mg/dL  --   --  3 0  --   --        I have personally reviewed the blood work as stated above and in my note  I have personally reviewed internal Medicine, co-consultants and previous nephrology notes

## 2023-01-30 NOTE — ASSESSMENT & PLAN NOTE
Last MRI brain in 4/22: "Chronic lacunar infarct in the left ventral medial thalamus   Small, chronic infarct in the right parietal lobe"     Baseline, emotionally labile

## 2023-01-30 NOTE — PLAN OF CARE
Started patient on hemodialysis treatment with UF goal of 3L net as tolerated x 3 5 hours x 2K bath for serum k+ of 5 2 today 1/30/23  Post-Dialysis RN Treatment Note    Blood Pressure:  Pre 133/75 mm/Hg  Post 111/61 mmHg   EDW  TBD kg    Weight:  Pre 100 7 kg   Post 98 3 kg   Mode of weight measurement: Bed Scale   Volume Removed  2,386 ml NET   Treatment duration 210 minutes    NS given  Yes, 100ml when BP dropped to 79/51   Treatment shortened? No   Medications given during Rx None Reported   Estimated Kt/V  Not Applicable   Access type: AV fistula   Access Issues: Yes, describe: patient is a bleeder      Report called to primary nurse   Yes, in person to Danielle Freed RN      Problem: METABOLIC, FLUID AND ELECTROLYTES - ADULT  Goal: Electrolytes maintained within normal limits  Description: INTERVENTIONS:  - Monitor labs and assess patient for signs and symptoms of electrolyte imbalances  - Administer electrolyte replacement as ordered  - Monitor response to electrolyte replacements, including repeat lab results as appropriate  - Instruct patient on fluid and nutrition as appropriate  Outcome: Progressing  Goal: Fluid balance maintained  Description: INTERVENTIONS:  - Monitor labs   - Monitor I/O and WT  - Instruct patient on fluid and nutrition as appropriate  - Assess for signs & symptoms of volume excess or deficit  Outcome: Progressing

## 2023-01-30 NOTE — NURSING NOTE
Arrived in patients room after hearing bed alarm, patient was attempting to get out of bed to go to the bathroom and was unsteady on his feet  Informed patient we were to use the urinal patient refused and peed on the floor  Completed a neuro assessment at this time, patient is aox4  Instructed patient to call for assistance  Placed yellow socks on patient as well as fall risk bracelet  Transferred patient to recliner with chair alarm with assistance of 2  Showed the patient how to use the call bell and had him repeat back to me that he knew how to contact the nurse  Patient stated that if he wants to get up he will and I wont stop him  Patient in room in front of nurses station  Huddled with other staff on the unit to inform that the patient is aox4 but a fall risk

## 2023-01-30 NOTE — ASSESSMENT & PLAN NOTE
· POA with sudden onset of SOB with chest pain     · CXR with moderate edema, b/l effusions  · Pt's spouse says he has not been following fluid restriction   · Started on BIPAP in the ED d/t resp distress; appears comfortable now, but could not wean from bipap  · S/p lasix 80mg x1  · Admitted to [facility]  · Nephrology aware and plan for HD today    Plan:  Requiring BiPAP all morning for increased work of breathing, respiratory rate  Discussed case with ICU team, agreeable to transfer care to ICU  ABG

## 2023-01-30 NOTE — ASSESSMENT & PLAN NOTE
Elevated troponins present on arrival: 4,[id number],[id number]  · EKG: NSR 76 with RBBB  · Likely nonischemic MI in the setting flash pulmonary edema  · Heparin Drip started  · Cardiology consulted

## 2023-01-30 NOTE — ASSESSMENT & PLAN NOTE
Lab Results   Component Value Date    EGFR 5 01/30/2023    EGFR 10 01/29/2023    EGFR 9 01/26/2023    CREATININE 9 62 (H) 01/30/2023    CREATININE 5 40 (H) 01/29/2023    CREATININE 5 61 (H) 01/26/2023     UF 1/29    Plan:  Nephrology consulted; appreciate recs  HD 1/30

## 2023-01-30 NOTE — ASSESSMENT & PLAN NOTE
Patient presented for evaluation of acute shortness of breath and chest pressure  · CxR obtained in the ED on arrival revealing moderate pulmonary edema and bilateral pleural effusions  · BiPAP started in the secondary to respiratory distress and increased work of breathing  · Volume overloaded on exam  · History of ESRD has been noncompliant with fluid restrictions at home, UF performed on day of admission and HD scheduled MWF as per usual schedule  · BNP: 1,572  · Takes torsemide 50 mg daily  · ECHO 1/30:   The left ventricular ejection fraction is 25-30%  Systolic function is severely reduced  There is mild global hypokinesis    Plan:  · Daily weights  · Continue BiPap, wean as tolerated   · Will administer ipratropium and xopenex  · Obtain ABG  · Actively recieving HD, will reasess volume status after treatment and reassess volume status and need for additional diuresis

## 2023-01-30 NOTE — ASSESSMENT & PLAN NOTE
Lab Results   Component Value Date    HGBA1C 5 5 12/23/2022       Recent Labs     01/29/23  1806 01/29/23  2119 01/30/23  0729 01/30/23  1100   POCGLU 152* 168* 134 140       Blood Sugar Average: Last 72 hrs:  (P) 142 5   Resume home insulin regimen when pt able to eat     Home: Lantus 14 U daily

## 2023-01-30 NOTE — ASSESSMENT & PLAN NOTE
· POA with troponin of 4K  · No current chest pain   EKG comparable to baseline  · Suspect nonMI elevation in the setting of flash pulm edema with known bad CAD  · 1/30 ECHO: LVEF 25-30%    Plan:  Cardiology consulted; appreciate recs  Cath lab not indicated at this time, to reassess tomorrow  Continue heparin gtt  Telemetry

## 2023-01-30 NOTE — ASSESSMENT & PLAN NOTE
History of CAD with multiple interventions  · Takes home of prasugrel, coreg, and ASA  · Continue home regimen

## 2023-01-30 NOTE — NURSING NOTE
Upon assessment of patient, he is tachypnic with respirations in the 30's  Using abdominal accessory muscles to breathe  Lungs clear but diminished to auscultation  Lethargic and slightly difficult to arouse  Did answer orientation questions appropriately when awake  B/L upper and lower restraints removed and posey belt removed as well  Respiratory contacted and patient placed in Bipap  Unable to take PO pills due to being NPO  Dr Estephania Gomez with [facility] made aware  No new orders at this time

## 2023-01-30 NOTE — PLAN OF CARE
Problem: METABOLIC, FLUID AND ELECTROLYTES - ADULT  Goal: Electrolytes maintained within normal limits  Description: INTERVENTIONS:  - Monitor labs and assess patient for signs and symptoms of electrolyte imbalances  - Administer electrolyte replacement as ordered  - Monitor response to electrolyte replacements, including repeat lab results as appropriate  - Instruct patient on fluid and nutrition as appropriate  Outcome: Progressing  Goal: Fluid balance maintained  Description: INTERVENTIONS:  - Monitor labs   - Monitor I/O and WT  - Instruct patient on fluid and nutrition as appropriate  - Assess for signs & symptoms of volume excess or deficit  Outcome: Progressing     Problem: MOBILITY - ADULT  Goal: Maintain or return to baseline ADL function  Description: INTERVENTIONS:  -  Assess patient's ability to carry out ADLs; assess patient's baseline for ADL function and identify physical deficits which impact ability to perform ADLs (bathing, care of mouth/teeth, toileting, grooming, dressing, etc )  - Assess/evaluate cause of self-care deficits   - Assess range of motion  - Assess patient's mobility; develop plan if impaired  - Assess patient's need for assistive devices and provide as appropriate  - Encourage maximum independence but intervene and supervise when necessary  - Involve family in performance of ADLs  - Assess for home care needs following discharge   - Consider OT consult to assist with ADL evaluation and planning for discharge  - Provide patient education as appropriate  Outcome: Progressing  Goal: Maintains/Returns to pre admission functional level  Description: INTERVENTIONS:  - Perform BMAT or MOVE assessment daily    - Set and communicate daily mobility goal to care team and patient/family/caregiver     - Collaborate with rehabilitation services on mobility goals if consulted  - Out of bed for toileting  - Record patient progress and toleration of activity level   Outcome: Progressing Problem: Prexisting or High Potential for Compromised Skin Integrity  Goal: Skin integrity is maintained or improved  Description: INTERVENTIONS:  - Identify patients at risk for skin breakdown  - Assess and monitor skin integrity  - Assess and monitor nutrition and hydration status  - Monitor labs   - Assess for incontinence   - Turn and reposition patient  - Assist with mobility/ambulation  - Relieve pressure over bony prominences  - Avoid friction and shearing  - Provide appropriate hygiene as needed including keeping skin clean and dry  - Evaluate need for skin moisturizer/barrier cream  - Collaborate with interdisciplinary team   - Patient/family teaching  - Consider wound care consult   Outcome: Progressing

## 2023-01-30 NOTE — CONSULTS
[facility] [place] 1960, 58 y o  male MRN: [id number]  Unit/Bed#: ICU 06 Encounter: [id number]  Primary Care Provider: Shiela Gold,    Date and time admitted to hospital: 1/29/2023  8:10 AM    Consults    * Acute respiratory failure with hypoxia [facility])  Assessment & Plan  Patient presented for evaluation of acute shortness of breath and chest pressure  · CxR obtained in the ED on arrival revealing moderate pulmonary edema and bilateral pleural effusions  · BiPAP started in the secondary to respiratory distress and increased work of breathing  · Volume overloaded on exam  · History of ESRD has been noncompliant with fluid restrictions at home, UF performed on day of admission and HD scheduled MWF as per usual schedule  · BNP: 1,572  · Takes torsemide 50 mg daily  · ECHO 1/30:   The left ventricular ejection fraction is 25-30%  Systolic function is severely reduced  There is mild global hypokinesis    Plan:  · Daily weights  · Continue BiPap, wean as tolerated   · Will administer ipratropium and xopenex  · Obtain ABG  · Actively recieving HD, will reasess volume status after treatment and reassess volume status and need for additional diuresis      ESRD on dialysis [facility])  Assessment & Plan  Lab Results   Component Value Date    EGFR 5 01/30/2023    EGFR 10 01/29/2023    EGFR 9 01/26/2023    CREATININE 9 62 (H) 01/30/2023    CREATININE 5 40 (H) 01/29/2023    CREATININE 5 61 (H) 01/26/2023   ·   · History of ESRD on HD MWF  · Has been noncompliant with fluid restrictions at home per wife  · UF treatment performed on 1/29 with removal of 2 5L  · Volume overloaded on exam  Plan:  · Nephro  · Daily Weights  · Monitor I/Os  · Fluid restriction 1-1 5L/d  · Renal Diet    Elevated troponin with chest pain  Assessment & Plan  Elevated troponins present on arrival: 4,037-7,138-11,962-16,894  · Likely nonischemic MI in the setting flash pulmonary edema  · Heparin Drip started  · Cardiology consulted    Coronary artery disease involving native coronary artery of native heart without angina pectoris  Assessment & Plan  History of CAD with multiple interventions  · Takes home of prasugrel, coreg, and ASA  · Continue home regimen     History of stroke  Assessment & Plan  History of CVA, on ASA and statin    Diabetic polyneuropathy associated with type 2 diabetes mellitus [facility])  Assessment & Plan  Lab Results   Component Value Date    HGBA1C 5 5 12/23/2022       Recent Labs     01/29/23  1806 01/29/23  2119 01/30/23  0729 01/30/23  1100   POCGLU 152* 168* 134 140       Blood Sugar Average: Last 72 hrs:  (P) 142 5   Known history of Type 2 Diabetes Mellitus  • Home regimen of: Insulin  Plan:  • Lantus 14 Units Daily, Lispro 4 Units TID with Meals  • Sliding Scale  • Glucose Checks and Insulin Correction ACHS  • Goal -180 while admitted, adjusting insulin regimen as appropriate  • Hypoglycemia monitoring and treat with protocol        -------------------------------------------------------------------------------------------------------------  Chief Complaint: Shortness of breath    History of Present Illness   HX and PE limited by: BiPAP  Buddy Ortiz is a 58 y o  male who initially presented on 1/29/23 to the ED for evaluation of acute onset shortness of breath and chest pressure  POA was found to be respiratory distress and was placed on BiPAP for increased working of breathing and hypoxia with SP02 in the 80's  Initial workup revealed elevated troponins of 3H-6Q-19S-16K, elevated BNP of 1,572 and CxR showing pulmonary edema and b/l effusions  Pt received IV lasix 80mg and addmited to floor for respiratory distress in the setting volume overloaded status  Yesterday patient received UF where 1 5L was removed  Pt continues to be in respiratory distress and remains on BiPAP and critical care was consulted      History obtained from chart review and the patient   -------------------------------------------------------------------------------------------------------------  Dispo: Transfer to Critical Care     Code Status: Level 1 - Full Code  --------------------------------------------------------------------------------------------------------------  Review of Systems    A 12-point, complete review of systems was reviewed and negative except as stated above     Physical Exam  Vitals reviewed  Constitutional:       Appearance: He is obese  He is ill-appearing  HENT:      Head: Normocephalic and atraumatic  Eyes:      Extraocular Movements: Extraocular movements intact  Pupils: Pupils are equal, round, and reactive to light  Cardiovascular:      Rate and Rhythm: Normal rate and regular rhythm  Pulses: Normal pulses  Pulmonary:      Effort: Respiratory distress present  Breath sounds: Rales present  Musculoskeletal:         General: No swelling or tenderness  Normal range of motion  Cervical back: Normal range of motion and neck supple  Skin:     General: Skin is warm  Neurological:      General: No focal deficit present  Mental Status: He is alert and oriented to person, place, and time  Mental status is at baseline          --------------------------------------------------------------------------------------------------------------  Vitals:   Vitals:    01/30/23 1445 01/30/23 1451 01/30/23 1500 01/30/23 1515   BP: 133/75  135/76 125/72   BP Location: Right arm      Pulse: 98  95 91   Resp: (!) 32  (!) 34 (!) 25   Temp: 99 7 °F (37 6 °C)      TempSrc: Tympanic      SpO2: 99% 97% 98% 97%   Weight:       Height:         Temp  Min: 97 7 °F (36 5 °C)  Max: 101 2 °F (38 4 °C)  IBW (Ideal Body Weight): 70 7 kg  Height: 5' 9" (175 3 cm)  Body mass index is 32 78 kg/m²      Laboratory and Diagnostics:  Results from last 7 days   Lab Units 01/30/23  1505 01/30/23  0458 01/29/23  1415 01/29/23  0821 01/26/23  0453 01/25/23 1958   WBC Thousand/uL  --  11 00*  --  11 83* 5 48 5 62   HEMOGLOBIN g/dL  --  9 6*  --  10 9* 10 1* 10 1*   I STAT HEMOGLOBIN g/dl 9 9*  --   --   --   --   --    HEMATOCRIT %  --  30 2*  --  35 6* 31 4* 31 5*   HEMATOCRIT, ISTAT % 29*  --   --   --   --   --    PLATELETS Thousands/uL  --  148* 172 181 140* 129*   NEUTROS PCT %  --  75  --  78*  --  68   MONOS PCT %  --  11  --  9  --  13*     Results from last 7 days   Lab Units 01/30/23  1505 01/30/23  0458 01/29/23  0821 01/26/23  0453 01/25/23  1958   SODIUM mmol/L  --  136 141 138 136   POTASSIUM mmol/L  --  5 2 3 5 4 4 3 6   CHLORIDE mmol/L  --  98 114* 97 93*   CO2 mmol/L  --  25 20* 32 33*   CO2, I-STAT mmol/L 25  --   --   --   --    ANION GAP mmol/L  --  13 7 9 10   BUN mg/dL  --  68* 36* 29* 23   CREATININE mg/dL  --  9 62* 5 40* 5 61* 4 50*   CALCIUM mg/dL  --  8 8 6 0* 9 1 9 4   GLUCOSE RANDOM mg/dL  --  142* 114 95 145*   ALT U/L  --  13 7  --  10   AST U/L  --  27 13  --  11*   ALK PHOS U/L  --  60 51  --  81   ALBUMIN g/dL  --  4 0 2 7*  --  4 3   TOTAL BILIRUBIN mg/dL  --  0 75 0 44  --  0 64     Results from last 7 days   Lab Units 01/29/23  0821   MAGNESIUM mg/dL 1 5*   PHOSPHORUS mg/dL 3 0      Results from last 7 days   Lab Units 01/30/23  1328 01/30/23  0458 01/29/23 2118 01/29/23  0821   INR   --   --   --  1 03   PTT seconds 38* 46* 28  --           Results from last 7 days   Lab Units 01/29/23 2114 01/29/23  0821   LACTIC ACID mmol/L 1 2 1 1     ABG:    VBG:    Results from last 7 days   Lab Units 01/29/23  0844   PROCALCITONIN ng/ml 0 31*       Micro:  Results from last 7 days   Lab Units 01/29/23  0844 01/29/23  0821   BLOOD CULTURE  No Growth at 24 hrs  No Growth at 24 hrs  EKG: , RBBB, T wave abnormalities  Imaging: I have personally reviewed pertinent reports          Historical Information   Past Medical History:   Diagnosis Date   • Cerebrovascular accident (CVA) due to thrombosis of left middle cerebral artery ([facility] [address] [facility] ) 7/29/2018 • Chronic kidney disease    • [facility]    • GERD (gastroesophageal reflux disease)    • Hypercholesteremia    • Hyperlipidemia    • Hypertension    • 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   • Neuropathy    • Obesity    • Osteomyelitis ([facility] [address] )     last assessed 11/4/16   • PVC's (premature ventricular contractions)     sees cardiology Dr Nevaeh camargo   • Stroke [facility])     last weeof July 2018 [address] [facility]   • TIA (transient ischemic attack) 10/28/2018     Past Surgical History:   Procedure Laterality Date   • ABDOMINAL SURGERY     • CARDIAC CATHETERIZATION N/A 5/2/2022    Procedure: Cardiac Coronary Angiogram;  Surgeon: Zoila Almazan MD;  [facility] CARDIAC [facility]ATH [facility]; Service: Cardiology   • CARDIAC CATHETERIZATION N/A 5/2/2022    Procedure: Cardiac pci;  Surgeon: Zoila Almazan MD;  Location: [facility] CARDIAC CATH LAB; Service: Cardiology   • CHOLECYSTECTOMY      Percutaneous   • COLONOSCOPY     • CYSTOSCOPY     • OTHER SURGICAL HISTORY      "stimulator to control bowel movements"   • LA ESOPHAGOGASTRODUODENOSCOPY TRANSORAL DIAGNOSTIC N/A 9/27/2016    Procedure: ESOPHAGOGASTRODUODENOSCOPY (EGD); Surgeon: Michelle Black MD;  Location: [facility] GI LAB;   Service: Gastroenterology   • LA LAPAROSCOPY SURG CHOLECYSTECTOMY N/A 2/29/2016    Procedure: LAPAROSCOPIC CHOLECYSTECTOMY ;  Surgeon: Maribel Brown DO;  Location: [facility] Main OR;  Service: General   • ROTATOR CUFF REPAIR Right    • TOE AMPUTATION Right 10/28/2016    Procedure: 3RD TOE AMPUTATION ;  Surgeon: Evans Ospina DPM;  Location: [facility] Main OR;  Service:      Social History   Social History     Substance and Sexual Activity   Alcohol Use Not Currently     Social History     Substance and Sexual Activity   Drug Use No     Social History     Tobacco Use   Smoking Status Never   Smokeless Tobacco Never     Exercise History: N/A  Family History:   Family History   Problem Relation Age of Onset   • Leukemia Mother    • Liver disease Mother • Lung cancer Mother         heavy smoker - 3 ppd   • Heart disease Father    • Liver disease Father    • Multiple myeloma Sister    • Breast cancer Sister    • Urolithiasis Family    • Alcohol abuse Neg Hx    • Depression Neg Hx    • Drug abuse Neg Hx    • Substance Abuse Neg Hx    • Mental illness Neg Hx      I have reviewed this patient's family history and commented on sigificant items within the HPI      Medications:  Current Facility-Administered Medications   Medication Dose Route Frequency   • aspirin (ECOTRIN LOW STRENGTH) EC tablet 81 mg  81 mg Oral Daily   • atorvastatin (LIPITOR) tablet 40 mg  40 mg Oral Daily With Dinner   • calcium acetate (PHOSLO) capsule 667 mg  667 mg Oral TID With Meals   • carvedilol (COREG) tablet 12 5 mg  12 5 mg Oral BID With Meals   • chlorhexidine (PERIDEX) 0 12 % oral rinse 15 mL  15 mL Mouth/Throat Q12H [address]   • heparin (porcine) 25,000 units in 0 45% NaCl 250 mL infusion (premix)  3-20 Units/kg/hr (Order-Specific) Intravenous Titrated   • heparin (porcine) injection 2,000 Units  2,000 Units Intravenous Q6H PRN   • heparin (porcine) injection 4,000 Units  4,000 Units Intravenous Q6H PRN   • insulin glargine (LANTUS) subcutaneous injection 14 Units 0 14 mL  14 Units Subcutaneous Daily   • insulin lispro (HumaLOG) 100 units/mL subcutaneous injection 1-5 Units  1-5 Units Subcutaneous TID AC   • insulin lispro (HumaLOG) 100 units/mL subcutaneous injection 1-5 Units  1-5 Units Subcutaneous HS   • ipratropium (ATROVENT) 0 02 % inhalation solution 0 5 mg  0 5 mg Nebulization TID PRN   • levalbuterol (XOPENEX) inhalation solution 1 25 mg  1 25 mg Nebulization TID PRN   • melatonin tablet 3 mg  3 mg Oral HS   • nitroglycerin (NITROSTAT) SL tablet 0 4 mg  0 4 mg Sublingual Once   • prasugrel (EFFIENT) tablet 5 mg  5 mg Oral Daily   • torsemide (DEMADEX) tablet 100 mg  100 mg Oral Daily   • valproate (DEPACON) 250 mg in sodium chloride 0 9 % 50 mL IVPB  250 mg Intravenous Q12H [address] Home medications:  Prior to Admission Medications   Prescriptions Last Dose Informant Patient Reported? Taking? Blood Glucose Monitoring Suppl (True Metrix Meter) w/Device KIT   No No   Sig: Use to test blood sugars 3 times daily   Blood Pressure Monitoring (BLOOD PRESSURE CUFF) MISC   No No   Sig: Use to check blood pressure before taking blood pressure medication and 1 hour after and follow instructions provided in discharge instructions based on the readings     Cholecalciferol (Vitamin D3) 1 25 MG (10871 UT) CAPS   No No   Sig: TAKE 1 CAPSULE BY MOUTH ONE TIME PER WEEK   Insulin Pen Needle (BD Pen Needle Adina U/F) 32G X 4 MM MISC   No No   Sig: Use 4 times daily   Insulin Syringe-Needle U-100 (B-D INS SYRINGE 0 5CC/30GX1/2") 30G X 1/2" 0 5 ML MISC   No No   Sig: Inject once daily   Lancets Ultra Thin 30G MISC   No No   Sig: Use 3 times a day   Methoxy PEG-Epoetin Beta 30 MCG/0 3ML SOSY   Yes No   Si mcg   Patient not taking: Reported on    ONETOUCH DELICA LANCETS 69Z MISC   No No   Sig: by Does not apply route 3 (three) times a day   aspirin (ECOTRIN LOW STRENGTH) 81 mg EC tablet   Yes No   Sig: Take 81 mg by mouth daily Resume on      atorvastatin (LIPITOR) 40 mg tablet   No No   Sig: TAKE 1 TABLET BY MOUTH EVERY DAY WITH DINNER   calcium acetate (CALPHRON) 667 mg   Yes No   Sig: 3 tablets 3x per day   carvedilol (COREG) 12 5 mg tablet   No No   Sig: Take 1 tablet (12 5 mg total) by mouth 2 (two) times a day with meals   divalproex sodium (DEPAKOTE SPRINKLE) 125 MG capsule   No No   Sig: Take 2 capsules (250 mg total) by mouth every 12 (twelve) hours   glucose blood (True Metrix Blood Glucose Test) test strip   No No   Sig: Use 1 each 3 (three) times a day Use as instructed   insulin glargine (Lantus) 100 units/mL subcutaneous injection   No No   Sig: Inject 14 Units under the skin daily   insulin lispro (HumaLOG KwikPen) 100 units/mL injection pen   No No   Sig: INJECT 4 UNITS 3 TIMES A DAY WITH MEALS PLUS SCALE (max daily dose 42 units)   prasugrel (EFFIENT) tablet   Yes No   Sig: Take 1 tablet by mouth daily   torsemide (DEMADEX) 100 mg tablet   No No   Sig: Take 0 5 tablets (50 mg total) by mouth daily As directed by your nephrologist      Facility-Administered Medications: None     Allergies:  No Known Allergies  ------------------------------------------------------------------------------------------------------------  Advance Directive and Living Will:      Power of :    POLST:    ------------------------------------------------------------------------------------------------------------      Binta Weiss,         Portions of the record may have been created with voice recognition software  Occasional wrong word or "sound a like" substitutions may have occurred due to the inherent limitations of voice recognition software    Read the chart carefully and recognize, using context, where substitutions have occurred

## 2023-01-31 ENCOUNTER — APPOINTMENT (INPATIENT)
Dept: RADIOLOGY | Facility: HOSPITAL | Age: 63
End: 2023-01-31

## 2023-01-31 ENCOUNTER — APPOINTMENT (INPATIENT)
Dept: DIALYSIS | Facility: HOSPITAL | Age: 63
End: 2023-01-31

## 2023-01-31 LAB
ALBUMIN SERPL BCP-MCNC: 4 G/DL (ref 3.5–5)
ALP SERPL-CCNC: 56 U/L (ref 34–104)
ALT SERPL W P-5'-P-CCNC: 13 U/L (ref 7–52)
ANION GAP SERPL CALCULATED.3IONS-SCNC: 14 MMOL/L (ref 4–13)
APTT PPP: 49 SECONDS (ref 23–37)
APTT PPP: 65 SECONDS (ref 23–37)
APTT PPP: >210 SECONDS (ref 23–37)
AST SERPL W P-5'-P-CCNC: 21 U/L (ref 13–39)
BILIRUB DIRECT SERPL-MCNC: 0.25 MG/DL (ref 0–0.2)
BILIRUB SERPL-MCNC: 1.04 MG/DL (ref 0.2–1)
BUN SERPL-MCNC: 52 MG/DL (ref 5–25)
CA-I BLD-SCNC: 1 MMOL/L (ref 1.12–1.32)
CALCIUM SERPL-MCNC: 8.9 MG/DL (ref 8.4–10.2)
CARDIAC TROPONIN I PNL SERPL HS: 8884 NG/L (ref 8–18)
CHLORIDE SERPL-SCNC: 93 MMOL/L (ref 96–108)
CO2 SERPL-SCNC: 28 MMOL/L (ref 21–32)
CREAT SERPL-MCNC: 7.29 MG/DL (ref 0.6–1.3)
ERYTHROCYTE [DISTWIDTH] IN BLOOD BY AUTOMATED COUNT: 15.6 % (ref 11.6–15.1)
GFR SERPL CREATININE-BSD FRML MDRD: 7 ML/MIN/1.73SQ M
GLUCOSE SERPL-MCNC: 108 MG/DL (ref 65–140)
GLUCOSE SERPL-MCNC: 118 MG/DL (ref 65–140)
GLUCOSE SERPL-MCNC: 125 MG/DL (ref 65–140)
GLUCOSE SERPL-MCNC: 134 MG/DL (ref 65–140)
GLUCOSE SERPL-MCNC: 192 MG/DL (ref 65–140)
HCT VFR BLD AUTO: 29.9 % (ref 36.5–49.3)
HGB BLD-MCNC: 9.7 G/DL (ref 12–17)
MAGNESIUM SERPL-MCNC: 3 MG/DL (ref 1.9–2.7)
MCH RBC QN AUTO: 30.9 PG (ref 26.8–34.3)
MCHC RBC AUTO-ENTMCNC: 32.4 G/DL (ref 31.4–37.4)
MCV RBC AUTO: 95 FL (ref 82–98)
MRSA NOSE QL CULT: NORMAL
PHOSPHATE SERPL-MCNC: 5.9 MG/DL (ref 2.3–4.1)
PLATELET # BLD AUTO: 160 THOUSANDS/UL (ref 149–390)
PMV BLD AUTO: 10.2 FL (ref 8.9–12.7)
POTASSIUM SERPL-SCNC: 4.3 MMOL/L (ref 3.5–5.3)
PROT SERPL-MCNC: 7.5 G/DL (ref 6.4–8.4)
RBC # BLD AUTO: 3.14 MILLION/UL (ref 3.88–5.62)
SODIUM SERPL-SCNC: 135 MMOL/L (ref 135–147)
WBC # BLD AUTO: 8.33 THOUSAND/UL (ref 4.31–10.16)

## 2023-01-31 RX ORDER — SODIUM CHLORIDE, SODIUM GLUCONATE, SODIUM ACETATE, POTASSIUM CHLORIDE, MAGNESIUM CHLORIDE, SODIUM PHOSPHATE, DIBASIC, AND POTASSIUM PHOSPHATE .53; .5; .37; .037; .03; .012; .00082 G/100ML; G/100ML; G/100ML; G/100ML; G/100ML; G/100ML; G/100ML
500 INJECTION, SOLUTION INTRAVENOUS ONCE
Status: DISCONTINUED | OUTPATIENT
Start: 2023-01-31 | End: 2023-02-01 | Stop reason: HOSPADM

## 2023-01-31 RX ORDER — ACETAMINOPHEN 325 MG/1
975 TABLET ORAL EVERY 8 HOURS PRN
Status: DISCONTINUED | OUTPATIENT
Start: 2023-01-31 | End: 2023-02-01 | Stop reason: HOSPADM

## 2023-01-31 RX ORDER — BENZONATATE 100 MG/1
200 CAPSULE ORAL 3 TIMES DAILY PRN
Status: DISCONTINUED | OUTPATIENT
Start: 2023-01-31 | End: 2023-01-31

## 2023-01-31 RX ORDER — GUAIFENESIN 100 MG/5ML
200 SOLUTION ORAL EVERY 4 HOURS PRN
Status: DISCONTINUED | OUTPATIENT
Start: 2023-01-31 | End: 2023-02-01 | Stop reason: HOSPADM

## 2023-01-31 RX ADMIN — CALCIUM ACETATE 667 MG: 667 CAPSULE ORAL at 08:19

## 2023-01-31 RX ADMIN — TORSEMIDE 100 MG: 100 TABLET ORAL at 08:19

## 2023-01-31 RX ADMIN — ASPIRIN 81 MG: 81 TABLET, COATED ORAL at 08:19

## 2023-01-31 RX ADMIN — MAGNESIUM SULFATE HEPTAHYDRATE 2 G: 40 INJECTION, SOLUTION INTRAVENOUS at 00:13

## 2023-01-31 RX ADMIN — CALCIUM ACETATE 667 MG: 667 CAPSULE ORAL at 16:24

## 2023-01-31 RX ADMIN — PRASUGREL HYDROCHLORIDE 5 MG: 10 TABLET, FILM COATED ORAL at 08:19

## 2023-01-31 RX ADMIN — INSULIN LISPRO 1 UNITS: 100 INJECTION, SOLUTION INTRAVENOUS; SUBCUTANEOUS at 16:31

## 2023-01-31 RX ADMIN — GUAIFENESIN 200 MG: 200 SOLUTION ORAL at 04:16

## 2023-01-31 RX ADMIN — HEPARIN SODIUM 19.1 UNITS/KG/HR: 10000 INJECTION, SOLUTION INTRAVENOUS at 11:48

## 2023-01-31 RX ADMIN — CHLORHEXIDINE GLUCONATE 0.12% ORAL RINSE 15 ML: 1.2 LIQUID ORAL at 08:19

## 2023-01-31 RX ADMIN — VALPROATE SODIUM 250 MG: 100 INJECTION, SOLUTION INTRAVENOUS at 10:32

## 2023-01-31 RX ADMIN — HEPARIN SODIUM 2000 UNITS: 1000 INJECTION INTRAVENOUS; SUBCUTANEOUS at 15:26

## 2023-01-31 RX ADMIN — CARVEDILOL 12.5 MG: 12.5 TABLET, FILM COATED ORAL at 08:20

## 2023-01-31 RX ADMIN — CHLORHEXIDINE GLUCONATE 0.12% ORAL RINSE 15 ML: 1.2 LIQUID ORAL at 21:04

## 2023-01-31 RX ADMIN — ALBUMIN HUMAN 12.5 G: 0.05 SOLUTION INTRAVENOUS at 15:21

## 2023-01-31 RX ADMIN — ATORVASTATIN CALCIUM 40 MG: 40 TABLET, FILM COATED ORAL at 16:24

## 2023-01-31 NOTE — ASSESSMENT & PLAN NOTE
Elevated troponins present on arrival: 4,037-7,138-11,962 peaking at 81,864, in a patient with a background of diffuse heart disease including multiple PCIs non compliant with DAPT, LAD thrombus s/p thrombectomy , balloon angioplasty LAD , moderate AS   · EKG: NSR 76 with RBBB , dynamic changes anterolateral leads   · Possibly  nonischemic MI in the setting flash pulmonary edema however taking in consideration patient's extensive cardiac h/o consider  Further eval ( cardiac cath)  · Heparin Drip started  · Cardiology consulted, patient stable clinically for cardiac cath, will need transfer to [facility]

## 2023-01-31 NOTE — PLAN OF CARE
Goal is to UF 2 L as tolerated and ordered today, UF only today  F160 used as verbally ordered by RHETT Holden  Pt's morning serum K level was 4 3  Problem: METABOLIC, FLUID AND ELECTROLYTES - ADULT  Goal: Electrolytes maintained within normal limits  Description: INTERVENTIONS:  - Monitor labs and assess patient for signs and symptoms of electrolyte imbalances  - Administer electrolyte replacement as ordered  - Monitor response to electrolyte replacements, including repeat lab results as appropriate  - Instruct patient on fluid and nutrition as appropriate  Outcome: Progressing  Goal: Fluid balance maintained  Description: INTERVENTIONS:  - Monitor labs   - Monitor I/O and WT  - Instruct patient on fluid and nutrition as appropriate  - Assess for signs & symptoms of volume excess or deficit  Outcome: Progressing     Post-Dialysis RN Treatment Note    Blood Pressure:  Pre 115/60 mm/Hg  Post 102/55 mmHg   EDW  TBD kg    Weight:  Pre 98 4 kg   Post 96 6 kg   Mode of weight measurement: Bed Scale   Volume Removed  1823 ml net    Treatment duration 113 minutes    NS given  No    Treatment shortened?  Yes, 7 mins d/t hypotension   Medications given during Rx None Reported   Estimated Kt/V  Not Applicable   Access type: AV fistula   Access Issues: No    Report called to primary nurse   Yes Zaid Santos, RN

## 2023-01-31 NOTE — ASSESSMENT & PLAN NOTE
Lab Results   Component Value Date    HGBA1C 5 5 12/23/2022       Recent Labs     01/30/23  1100 01/30/23  1654 01/30/23 2055 01/31/23  0741   POCGLU 140 113 137 108       Blood Sugar Average: Last 72 hrs:  (P) 134 [id number]   Known history of Type 2 Diabetes Mellitus  • Home regimen of:  Insulin  Plan:  • Lantus 14 Units Daily, Lispro 4 Units TID with Meals + SSI  • Glucose Checks and Insulin Correction ACHS  • Goal -180 while admitted, currently normoglycemic   • Hypoglycemia monitoring and treat with protocol

## 2023-01-31 NOTE — ASSESSMENT & PLAN NOTE
Patient presented for evaluation of acute shortness of breath and chest pressure  Likely in the setting of volume overload in a patient with a background of ESRD  No known h/o lung disease   S/p Bipap , currently on O2 NC 4-5 L   Will continue HD for volume status management MWF as per usual schedule  Continue O2 nc , wean off as able

## 2023-01-31 NOTE — OCCUPATIONAL THERAPY NOTE
Occupational Therapy Cancellation Note         Patient Name: Kortney Bland  IICVI'B Date: 1/31/2023 01/31/23 1032   Note Type   Note type Cancelled Session   Cancel Reasons Other  (pending transfer to [facility])   Additional Comments OT orders recieved  Chart review completed  Spoke to [place] at ICU mobiliy rounds  Pt with planned transfer to to [facility]  Will keep pt on OT caseload at this time and see if d/c plans change       Rose Mary Erickson MS, OTR/L

## 2023-01-31 NOTE — PROGRESS NOTES
[facility]  Progress Note - Patricio Urbina 1960, 58 y o  male MRN: [id number]  Unit/Bed#: ICU 06 Encounter: [id number]  Primary Care Provider: Carmen Mckeon DO   Date and time admitted to hospital: 1/29/2023  8:10 AM    * Acute respiratory failure with hypoxia [facility])  Assessment & Plan  Patient presented for evaluation of acute shortness of breath and chest pressure  Likely in the setting of volume overload in a patient with a background of ESRD  No known h/o lung disease   S/p Bipap , currently on O2 NC 4-5 L   Will continue HD for volume status management MWF as per usual schedule  Continue O2 nc , wean off as able         ESRD on dialysis [facility])  Assessment & Plan  Lab Results   Component Value Date    EGFR 7 01/31/2023    EGFR 5 01/30/2023    EGFR 10 01/29/2023    CREATININE 7 29 (H) 01/31/2023    CREATININE 9 62 (H) 01/30/2023    CREATININE 5 40 (H) 01/29/2023     · History of ESRD on HD MWF  · Access : left arm AV fistula   · Has been noncompliant with fluid restrictions at home per wife  · UF treatment performed on 1/29 with removal of 2 5L  · Dry weight 103 kg currently 98 kg, responded very well to dialysis yesterday, oxygen requirement improving , non acidotic  Plan:  · Nephro on board will follow recs, patient stable to transfer to [facility]  · Daily Weights  · Fluid restriction 1-1 5L/d  · Renal Diet when appropriate     Elevated troponin with chest pain  Assessment & Plan  Elevated troponins present on arrival: 4,[phone],[phone] peaking at 16,894, in a patient with a background of diffuse heart disease including multiple PCIs non compliant with DAPT, LAD thrombus s/p thrombectomy , balloon angioplasty LAD , moderate AS   · EKG: NSR 76 with RBBB , dynamic changes anterolateral leads   · Possibly  nonischemic MI in the setting flash pulmonary edema however taking in consideration patient's extensive cardiac h/o consider  Further eval ( cardiac cath)  · Heparin Drip started  · Cardiology consulted, patient stable clinically for cardiac cath, will need transfer to [facility]    Coronary artery disease involving native coronary artery of native heart without angina pectoris  Assessment & Plan  History of CAD with multiple interventions  · Takes home of prasugrel, coreg, and ASA  · Continue home regimen     History of stroke  Assessment & Plan  History of CVA, on ASA and statin    Diabetic polyneuropathy associated with type 2 diabetes mellitus [facility])  Assessment & Plan  Lab Results   Component Value Date    HGBA1C 5 5 12/23/2022       Recent Labs     01/30/23  1100 01/30/23  1654 01/30/23  2055 01/31/23  0741   POCGLU 140 113 137 108       Blood Sugar Average: Last 72 hrs:  (P) 134 [id number]   Known history of Type 2 Diabetes Mellitus  • Home regimen of: Insulin   • Complicated by polyneuropathy, diabetic ulcer on ball L foot , no surrounding cellulitis , sero sanguineous drainage  Plan:  • Lantus 14 Units Daily, Lispro 4 Units TID with Meals + SSI  • Glucose Checks and Insulin Correction ACHS  • Goal -180 while admitted, currently normoglycemic   • Hypoglycemia monitoring and treat with protocol  • Local wound care          VTE Pharmacologic Prophylaxis:   VTE Score: 5 Moderate Risk (Score 3-4) - Pharmacological DVT Prophylaxis Ordered: Heparin  Mechanical VTE Prophylaxis in Place: Yes    Patient Centered Rounds: I have performed bedside rounds with nursing staff today  Discussions with Specialists or Other Care Team Provider: attending physician     Education and Discussions with Family / Patient: Updated  (son) at bedside      Current Length of Stay: 2 day(s)    Current Patient Status: Inpatient     Discharge Plan / Estimated Discharge Date: Patient will require transfer to [facility] for cardiac cath    Code Status: Level 1 - Full Code    Overnight events:   S/p HD, Significant improvement in respiratory status, no blood pressure issues,  able to be wean off Bipap     Objective:     Vitals:   Temp (24hrs), Av 5 °F (37 5 °C), Min:98 °F (36 7 °C), Max:100 3 °F (37 9 °C)    Temp:  [98 °F (36 7 °C)-100 3 °F (37 9 °C)] 98 °F (36 7 °C)  HR:  [] 78  Resp:  [20-34] 26  BP: ()/(50-76) 124/60  SpO2:  [93 %-100 %] 100 %  Body mass index is 31 91 kg/m²  Input and Output Summary (last 24 hours): Intake/Output Summary (Last 24 hours) at 2023 1009  Last data filed at 2023 0800  Gross per 24 hour   Intake 843 84 ml   Output 2886 ml   Net -2042 16 ml       Physical Exam:     Physical Exam  Vitals and nursing note reviewed  Constitutional:       General: He is not in acute distress  Appearance: He is not toxic-appearing  HENT:      Head: Normocephalic and atraumatic  Right Ear: Tympanic membrane normal       Left Ear: Tympanic membrane normal       Nose: Nose normal       Mouth/Throat:      Mouth: Mucous membranes are moist    Eyes:      Extraocular Movements: Extraocular movements intact  Conjunctiva/sclera: Conjunctivae normal       Pupils: Pupils are equal, round, and reactive to light  Cardiovascular:      Rate and Rhythm: Normal rate  Pulses: Normal pulses  Heart sounds: No murmur heard  No gallop  Pulmonary:      Effort: Pulmonary effort is normal  No respiratory distress  Breath sounds: Rales present  No wheezing  Chest:      Chest wall: No tenderness  Abdominal:      General: Bowel sounds are normal  There is no distension  Palpations: Abdomen is soft  Tenderness: There is no abdominal tenderness  Musculoskeletal:         General: Normal range of motion  Cervical back: Normal range of motion  Right lower leg: No edema  Left lower leg: No edema  Skin:     General: Skin is warm  Capillary Refill: Capillary refill takes 2 to 3 seconds  Neurological:      Mental Status: He is alert and oriented to person, place, and time     Psychiatric:         Mood and Affect: Mood normal          Behavior: Behavior normal          Thought Content: Thought content normal          Judgment: Judgment normal           Additional Data:     Labs:  Results from last 7 days   Lab Units 01/31/23  0521 01/30/23  1505 01/30/23  0458   WBC Thousand/uL 8 33  --  11 00*   HEMOGLOBIN g/dL 9 7*  --  9 6*   I STAT HEMOGLOBIN   --    < >  --    HEMATOCRIT % 29 9*  --  30 2*   HEMATOCRIT, ISTAT   --    < >  --    PLATELETS Thousands/uL 160  --  148*   NEUTROS PCT %  --   --  75   LYMPHS PCT %  --   --  13*   MONOS PCT %  --   --  11   EOS PCT %  --   --  0    < > = values in this interval not displayed  Results from last 7 days   Lab Units 01/31/23  0521   SODIUM mmol/L 135   POTASSIUM mmol/L 4 3   CHLORIDE mmol/L 93*   CO2 mmol/L 28   BUN mg/dL 52*   CREATININE mg/dL 7 29*   ANION GAP mmol/L 14*   CALCIUM mg/dL 8 9   ALBUMIN g/dL 4 0   TOTAL BILIRUBIN mg/dL 1 04*   ALK PHOS U/L 56   ALT U/L 13   AST U/L 21   GLUCOSE RANDOM mg/dL 118     Results from last 7 days   Lab Units 01/29/23  0821   INR  1 03     Results from last 7 days   Lab Units 01/31/23  0741 01/30/23  2055 01/30/23  1654 01/30/23  1100 01/30/23  0729 01/29/23  2119 01/29/23  1806 01/29/23  1407 01/29/23  0842 01/26/23  1128 01/26/23  0713 01/25/23  2227   POC GLUCOSE mg/dl 108 137 113 140 134 168* 152* 122 139 125 105 127         Results from last 7 days   Lab Units 01/29/23  2114 01/29/23  0844 01/29/23  0821   LACTIC ACID mmol/L 1 2  --  1 1   PROCALCITONIN ng/ml  --  0 31*  --        Imaging: No pertinent imaging reviewed  Recent Cultures (last 7 days):     Results from last 7 days   Lab Units 01/29/23  0844 01/29/23  0821   BLOOD CULTURE  No Growth at 24 hrs  No Growth at 24 hrs         Lines/Drains:  Invasive Devices     Peripheral Intravenous Line  Duration           Peripheral IV 01/29/23 Right Antecubital 2 days    Peripheral IV 01/29/23 Right Wrist 2 days          Line  Duration           Hemodialysis AV Fistula Left Upper arm -- days                Telemetry:        Last 24 Hours Medication List:   Current Facility-Administered Medications   Medication Dose Route Frequency Provider Last Rate   • aspirin  81 mg Oral Daily Gelene Staple     • atorvastatin  40 mg Oral Daily With Dinner Gelene Staple     • calcium acetate  667 mg Oral TID With Meals Gelene Staple     • carvedilol  12 5 mg Oral BID With Meals Gelene Staple     • chlorhexidine  15 mL Mouth/Throat Q12H [address] [place]     • guaiFENesin  200 mg Oral Q4H PRN Marialuisa Solis MD     • heparin (porcine)  3-20 Units/kg/hr (Order-Specific) Intravenous Titrated Bill G Paul 19 1 Units/kg/hr (01/30/23 2240)   • heparin (porcine)  2,000 Units Intravenous Q6H PRN Gelene Staple     • heparin (porcine)  4,000 Units Intravenous Q6H PRN Gelene Staple     • insulin lispro  1-5 Units Subcutaneous 4x Daily (AC & HS) Gelene Staple     • ipratropium  0 5 mg Nebulization TID PRN Gelene Staple     • levalbuterol  1 25 mg Nebulization TID PRN Gelene Staple     • melatonin  3 mg Oral HS Bill Sanchez     • prasugrel  5 mg Oral Daily Gelene Staple     • torsemide  100 mg Oral Daily Bill Sanchez     • valproate sodium  250 mg Intravenous Q12H [address] Bill Sanchez 250 mg (01/30/23 2236)        Today, Patient Was Seen By: Neal Reyes MD    ** Please Note: This note has been constructed using a voice recognition system

## 2023-01-31 NOTE — ASSESSMENT & PLAN NOTE
Lab Results   Component Value Date    EGFR 7 01/31/2023    EGFR 5 01/30/2023    EGFR 10 01/29/2023    CREATININE 7 29 (H) 01/31/2023    CREATININE 9 62 (H) 01/30/2023    CREATININE 5 40 (H) 01/29/2023     · History of ESRD on HD MWF  · Access : left arm AV fistula   · Has been noncompliant with fluid restrictions at home per wife  · UF treatment performed on 1/29 with removal of 2 5L  · Dry weight 103 kg currently 98 kg, responded very well to dialysis yesterday, oxygen requirement improving , non acidotic  Plan:  · Nephro on board will follow recs, patient stable to transfer to [facility]  · Daily Weights  · Fluid restriction 1-1 5L/d  · Renal Diet when appropriate

## 2023-01-31 NOTE — PHYSICAL THERAPY NOTE
PHYSICAL THERAPY CANCELLATION NOTE      Patient Name: Leslie Lindsey  [id number] Date: 1/31/2023 01/31/23 1030   Note Type   Note type Cancelled Session   Cancel Reasons Other  (pending transfer to [facility] )   Additional Comments PT orders received, chart review performed  Spoke to [place]  Pt already OOB to recliner chair  Pt planned for xfer to [facility] for cardiac cath  will cancel PT eval as pt is planned to transfer to [facility] and already mobilized w/ RN Staff   Will keep pt on PT caseload at this campus until DC pending change in [address], PT, DPT

## 2023-01-31 NOTE — PROGRESS NOTES
Progress Note - Cardiology   Diana Sotelo 58 y o  male MRN: [id number]  Unit/Bed#: ICU 06 Encounter: [id number]    Assessment:  Principal Problem:    Acute respiratory failure with hypoxia ([facility] [address] )  Active Problems:    Diabetic polyneuropathy associated with type 2 diabetes mellitus ([facility] [address] )    History of stroke    ESRD on dialysis ([address] )    Elevated troponin with chest pain    Coronary artery disease involving native coronary artery of native heart without angina pectoris    60-year-old man with a history of end-stage renal disease on dialysis with a prior CVA and some intermittent issues with compliance in the past  He gets very attentive care from his family, he has been very compliant with medications recently  Cardiac standpoint, he has coronary disease with multiple interventions in the past  He had a cardiac catheterization at our facility in May 2022 where he had stenting to an OM  There was other disease in the LAD, circumflex and distal RCA system  Subsequently had a second opinion from [facility] where initially a repeat catheterization was done in June where he had stents to the LAD and circumflex  Plan for staged PCI to the RCA on a few occasions  However, when they have done interventions again they have seen the thrombus in the LAD system and the stent  He initially had aspiration thrombectomy  Subsequent cardiac catheterization he had balloon angioplasty  The last one was in August 2022  This admission, appears to be presenting with volume overload potentially with excess volume intake  However, he has new identification of a drop in his ejection fraction, intermittent symptoms of chest discomfort for the last few weeks, and troponin which was up to 16,000 yesterday  Yesterday, his respiratory status would not allow consideration of cardiac catheterization, but this is improved with ultrafiltration today      Plan:    After discussion with his primary cardiologist and patient and family, we discussed arranging transfer to the [address] [place] [address] for cardiac catheterization  He requires transfer to our higher level facility given his several interventions in the past and very calcified vessels  Last cardiac catheterizations he had OCT done at [facility]  I reviewed his chest x-ray  He still appears volume overloaded  Would repeat ultrafiltration today  Patient's family report that his dry weight is 225 pounds  May need a target a lower weight  Continue aspirin and Effient  For now, he is on heparin infusion  Continue this  Continue atorvastatin  Blood pressure controlled with Coreg  Will need addition of afterload reduction after catheterization  Has been discussing kidney transplant evaluation as an outpatient, but initially weight had been an issue for this  Other cardiac cormorbidities will affect this as well  Subjective/Objective     Subjective: This morning, he is comfortable  His family is still at the bedside  He is off of BiPAP  Says his breathing is doing well  More history able to be obtained today given improved respiratory status  He says he been feeling intermittent chest pain off and on the last 2 weeks  With his prior coronary interventions, he has not really complained of a lot of chest pain per the patient  This waxes and wanes without any significant interventions  It can last hours at a time  He does admit to drinking more fluid in general than his fluid restriction allows  Further ultrafiltration yesterday, his respiratory status improved significantly and he was able to be weaned off of BiPAP  Currently, he denies any chest pain or shortness of breath  He is on nasal cannula  I reviewed the films of his prior catheterization and the reports from [facility] with his primary cardiologist, Dr Hadley Mejias        Objective:    Vitals: /60   Pulse 78   Temp 98 °F (36 7 °C) (Axillary)   Resp (!) 26   Ht 5' 9" (1 753 m)   Wt 98 kg (216 lb 0 8 oz)   SpO2 100%   BMI 31 91 kg/m²   Vitals:    01/30/23 1840 01/31/23 0245   Weight: 98 3 kg (216 lb 11 4 oz) 98 kg (216 lb 0 8 oz)     Orthostatic Blood Pressures    Flowsheet Row Most Recent Value   Blood Pressure 124/60 filed at 01/31/2023 0820   Patient Position - Orthostatic VS Lying filed at 01/31/2023 0700            Intake/Output Summary (Last 24 hours) at 1/31/2023 0906  Last data filed at 1/30/2023 1840  Gross per 24 hour   Intake 527 2 ml   Output 2886 ml   Net -2358 8 ml     Physical Exam:   General appearance: more comfortable today  Off BiPap  Head: Normocephalic, without obvious abnormality, atraumatic  Neck: no carotid bruit, no JVD and supple, symmetrical, trachea midline  Lungs: bibasilar rales  Heart: S1, S2 regular  Soft IZABELA  Abdomen: soft, non-tender; bowel sounds normal; no masses,  no organomegaly  Extremities: no significant edema present  Pulses: symmetric bilaterally  Skin: some ecchymoses needed  Neurologic: A&O  Somewhat labile emotionally      Medications:    Current Facility-Administered Medications:   •  aspirin (ECOTRIN LOW STRENGTH) EC tablet 81 mg, 81 mg, Oral, Daily, [facility], 81 mg at 01/31/23 3147  •  atorvastatin (LIPITOR) tablet 40 mg, 40 mg, Oral, Daily With Dinner, Payton Villatoro  •  calcium acetate (PHOSLO) capsule 667 mg, 667 mg, Oral, TID With Meals, [facility], 667 mg at 01/31/23 1840  •  carvedilol (COREG) tablet 12 5 mg, 12 5 mg, Oral, BID With Meals, Payton Villatoro, 12 5 mg at 01/31/23 0820  •  chlorhexidine (PERIDEX) 0 12 % oral rinse 15 mL, 15 mL, Mouth/Throat, Q12H [address], Bill Sanchez, 15 mL at 01/31/23 5284  •  dexmedeTOMIDine (Precedex) 400 mcg in sodium chloride 0 9% 100 mL, 0 1-0 7 mcg/kg/hr, Intravenous, Titrated, Bill Sanchez  •  guaiFENesin (ROBITUSSIN) oral liquid 200 mg, 200 mg, Oral, Q4H PRN, Jhony Galaviz MD, 200 mg at 01/31/23 0416  •  heparin (porcine) 25,000 units in 0 45% NaCl 250 mL infusion (premix), 3-20 Units/kg/hr (Order-Specific), Intravenous, Titrated, Laz Marques, Last Rate: 17 2 mL/hr at 01/30/23 2240, 19 1 Units/kg/hr at 01/30/23 2240  •  heparin (porcine) injection 2,000 Units, 2,000 Units, Intravenous, Q6H PRN, Laz Gonzalezimes, 2,000 Units at 01/30/23 2236  •  heparin (porcine) injection 4,000 Units, 4,000 Units, Intravenous, Q6H PRN, Lza Gonzalezimes, 4,000 Units at 01/30/23 1415  •  insulin lispro (HumaLOG) 100 units/mL subcutaneous injection 1-5 Units, 1-5 Units, Subcutaneous, 4x Daily (AC & HS) **AND** Fingerstick Glucose (POCT), , , 4x Daily AC and at bedtime, Laz Marques  •  ipratropium (ATROVENT) 0 02 % inhalation solution 0 5 mg, 0 5 mg, Nebulization, TID PRN, Laz Marques  •  levalbuterol (XOPENEX) inhalation solution 1 25 mg, 1 25 mg, Nebulization, TID PRN, Laz Marques  •  melatonin tablet 3 mg, 3 mg, Oral, HS, Bill G Daniel, 3 mg at 01/29/23 2247  •  prasugrel (EFFIENT) tablet 5 mg, 5 mg, Oral, Daily, Laz Gonzalezimes, 5 mg at 01/31/23 9532  •  torsemide (DEMADEX) tablet 100 mg, 100 mg, Oral, Daily, Laz Marques, 100 mg at 01/31/23 5499  •  valproate (DEPACON) 250 mg in sodium chloride 0 9 % 50 mL IVPB, 250 mg, Intravenous, Q12H [facility], Laz Marques, Last Rate: 50 mL/hr at 01/30/23 2236, 250 mg at 01/30/23 2236    Lab Results:      Results from last 7 days   Lab Units 01/31/23  0521 01/30/23  1505 01/30/23  0458 01/29/23  1415 01/29/23  0821   WBC Thousand/uL 8 33  --  11 00*  --  11 83*   HEMOGLOBIN g/dL 9 7*  --  9 6*  --  10 9*   I STAT HEMOGLOBIN g/dl  --  9 9*  --   --   --    HEMATOCRIT % 29 9*  --  30 2*  --  35 6*   HEMATOCRIT, ISTAT %  --  29*  --   --   --    PLATELETS Thousands/uL 160  --  148* 172 181     Results from last 7 days   Lab Units 01/30/23  0458   TRIGLYCERIDES mg/dL 123   HDL mg/dL 30*     Results from last 7 days   Lab Units 01/31/23  0521 01/30/23  1505 01/30/23  0458 01/29/23  0821   SODIUM mmol/L 135  --  136 141   POTASSIUM mmol/L 4 3  --  5 2 3 5   CHLORIDE mmol/L 93*  --  98 114*   CO2 mmol/L 28  --  25 20*   CO2, I-STAT mmol/L  --  25  --   --    BUN mg/dL 52*  --  68* 36*   CREATININE mg/dL 7 29*  --  9 62* 5 40*   CALCIUM mg/dL 8 9  --  8 8 6 0*   ALK PHOS U/L 56  --  60 51   ALT U/L 13  --  13 7   AST U/L 21  --  27 13   GLUCOSE, ISTAT mg/dl  --  142*  --   --      Results from last 7 days   Lab Units 01/31/23  0521 01/30/23 2053 01/30/23  1328 01/29/23  2118 01/29/23  0821   INR   --   --   --   --  1 03   PTT seconds 65* 53* 38*   < >  --     < > = values in this interval not displayed  Results from last 7 days   Lab Units 01/31/23 0521 01/29/23  0821   MAGNESIUM mg/dL 3 0* 1 5*       Telemetry: Personally reviewed  Sinus rhythm    Echo:  Left Ventricle: Left ventricular cavity size is normal  Wall thickness is mildly increased  There is mild concentric hypertrophy  The left ventricular ejection fraction is 25-30%  Systolic function is severely reduced  There is mild global hypokinesis  •  Right Ventricle: Right ventricular cavity size is normal  Systolic function is normal   •  Aortic Valve: The aortic valve is trileaflet  The leaflets are moderately thickened  The leaflets are moderately calcified  There is moderately reduced mobility  There is mild regurgitation  There is moderate stenosis  The aortic valve peak velocity is 1 87 m/s  The aortic valve peak gradient is 14 mmHg  The aortic valve mean gradient is 9 mmHg  The dimensionless velocity index is 0 36  The aortic valve area is 1 97 cm2  •  Mitral Valve: There is severe annular calcification  There is moderate regurgitation  •  The following segments are akinetic: basal inferior and mid inferior  •  The following segments are hypokinetic: basal anterior, basal anteroseptal, basal inferoseptal, basal inferolateral, basal anterolateral, mid anterior, mid anteroseptal, mid inferoseptal, mid inferolateral, mid anterolateral, apical anterior, apical septal, apical inferior, apical lateral and apex    • Tricuspid Valve: The right ventricular systolic pressure is moderately elevated  The estimated right ventricular systolic pressure is 61 31 mmHg  Cardiac cath reports from May, June, August [facility]) reviewed

## 2023-01-31 NOTE — ASSESSMENT & PLAN NOTE
Elevated troponins present on arrival: 4,037-7,138-11,962 peaking at 50,178, in a patient with a background of diffuse heart disease including multiple PCIs non compliant with DAPT, LAD thrombus s/p thrombectomy , balloon angioplasty LAD , moderate AS   · EKG: NSR 76 with RBBB , dynamic changes anterolateral leads   · Likely nonischemic MI in the setting flash pulmonary edema  · Heparin Drip started  · Cardiology consulted, patient stable clinically for cardiac cath, will need transfer to [facility]

## 2023-01-31 NOTE — ASSESSMENT & PLAN NOTE
Lab Results   Component Value Date    HGBA1C 5 5 12/23/2022       Recent Labs     01/30/23  1100 01/30/23  1654 01/30/23 2055 01/31/23  0741   POCGLU 140 113 137 108       Blood Sugar Average: Last 72 hrs:  (P) 134 [id number]   Known history of Type 2 Diabetes Mellitus  • Home regimen of:  Insulin   • Complicated by polyneuropathy, diabetic ulcer on ball L foot , no surrounding cellulitis , sero sanguineous drainage  Plan:  • Lantus 14 Units Daily, Lispro 4 Units TID with Meals + SSI  • Glucose Checks and Insulin Correction ACHS  • Goal -180 while admitted, currently normoglycemic   • Hypoglycemia monitoring and treat with protocol  • Local wound care

## 2023-01-31 NOTE — PLAN OF CARE
Problem: METABOLIC, FLUID AND ELECTROLYTES - ADULT  Goal: Electrolytes maintained within normal limits  Description: INTERVENTIONS:  - Monitor labs and assess patient for signs and symptoms of electrolyte imbalances  - Administer electrolyte replacement as ordered  - Monitor response to electrolyte replacements, including repeat lab results as appropriate  - Instruct patient on fluid and nutrition as appropriate  Outcome: Progressing  Goal: Fluid balance maintained  Description: INTERVENTIONS:  - Monitor labs   - Monitor I/O and WT  - Instruct patient on fluid and nutrition as appropriate  - Assess for signs & symptoms of volume excess or deficit  Outcome: Progressing     Problem: MOBILITY - ADULT  Goal: Maintain or return to baseline ADL function  Description: INTERVENTIONS:  -  Assess patient's ability to carry out ADLs; assess patient's baseline for ADL function and identify physical deficits which impact ability to perform ADLs (bathing, care of mouth/teeth, toileting, grooming, dressing, etc )  - Assess/evaluate cause of self-care deficits   - Assess range of motion  - Assess patient's mobility; develop plan if impaired  - Assess patient's need for assistive devices and provide as appropriate  - Encourage maximum independence but intervene and supervise when necessary  - Involve family in performance of ADLs  - Assess for home care needs following discharge   - Consider OT consult to assist with ADL evaluation and planning for discharge  - Provide patient education as appropriate  Outcome: Progressing  Goal: Maintains/Returns to pre admission functional level  Description: INTERVENTIONS:  - Perform BMAT or MOVE assessment daily    - Set and communicate daily mobility goal to care team and patient/family/caregiver     - Collaborate with rehabilitation services on mobility goals if consulted  - Out of bed for toileting  - Record patient progress and toleration of activity level   Outcome: Progressing Problem: Prexisting or High Potential for Compromised Skin Integrity  Goal: Skin integrity is maintained or improved  Description: INTERVENTIONS:  - Identify patients at risk for skin breakdown  - Assess and monitor skin integrity  - Assess and monitor nutrition and hydration status  - Monitor labs   - Assess for incontinence   - Turn and reposition patient  - Assist with mobility/ambulation  - Relieve pressure over bony prominences  - Avoid friction and shearing  - Provide appropriate hygiene as needed including keeping skin clean and dry  - Evaluate need for skin moisturizer/barrier cream  - Collaborate with interdisciplinary team   - Patient/family teaching  - Consider wound care consult   Outcome: Progressing     Problem: SAFETY,RESTRAINT: NV/NON-SELF DESTRUCTIVE BEHAVIOR  Goal: Remains free of harm/injury (restraint for non violent/non self-detsructive behavior)  Description: INTERVENTIONS:  - Instruct patient/family regarding restraint use   - Assess and monitor physiologic and psychological status   - Provide interventions and comfort measures to meet assessed patient needs   - Identify and implement measures to help patient regain control  - Assess readiness for release of restraint   Outcome: Progressing  Goal: Returns to optimal restraint-free functioning  Description: INTERVENTIONS:  - Assess the patient's behavior and symptoms that indicate continued need for restraint  - Identify and implement measures to help patient regain control  - Assess readiness for release of restraint   Outcome: Progressing     Problem: Nutrition/Hydration-ADULT  Goal: Nutrient/Hydration intake appropriate for improving, restoring or maintaining nutritional needs  Description: Monitor and assess patient's nutrition/hydration status for malnutrition  Collaborate with interdisciplinary team and initiate plan and interventions as ordered  Monitor patient's weight and dietary intake as ordered or per policy   Utilize nutrition screening tool and intervene as necessary  Determine patient's food preferences and provide high-protein, high-caloric foods as appropriate       INTERVENTIONS:  - Monitor oral intake, urinary output, labs, and treatment plans  - Assess nutrition and hydration status and recommend course of action  - Evaluate amount of meals eaten  - Assist patient with eating if necessary   - Allow adequate time for meals  - Recommend/ encourage appropriate diets, oral nutritional supplements, and vitamin/mineral supplements  - Order, calculate, and assess calorie counts as needed  - Recommend, monitor, and adjust tube feedings and TPN/PPN based on assessed needs  - Assess need for intravenous fluids  - Provide specific nutrition/hydration education as appropriate  - Include patient/family/caregiver in decisions related to nutrition  Outcome: Progressing

## 2023-01-31 NOTE — ASSESSMENT & PLAN NOTE
Lab Results   Component Value Date    EGFR 7 01/31/2023    EGFR 5 01/30/2023    EGFR 10 01/29/2023    CREATININE 7 29 (H) 01/31/2023    CREATININE 9 62 (H) 01/30/2023    CREATININE 5 40 (H) 01/29/2023     · History of ESRD on HD MWF  · Access : left arm AV fistula   · Has been noncompliant with fluid restrictions at home per wife  · UF treatment performed on 1/29 with removal of 2 5L  · Dry weight 103 kg currently 98 kg, responded very well to dialysis yesterday, oxygen requirement improving , non acidotic  Plan:  · Nephro on board will follow recs, patient stable to transfer to [facility]  · Daily Weights  · Fluid restriction 1-1 5L/d  · Renal Diet when appropriate

## 2023-01-31 NOTE — PROGRESS NOTES
NEPHROLOGY PROGRESS NOTE   Ria Cast 58 y o  male MRN: [id number]  Unit/Bed#: ICU 06 Encounter: [id number]  Reason for Consult: ESRD      SUMMARY:    51-year-old male with a history of end-stage renal disease, morbid obesity, coronary artery disease who presented for shortness of breath  Nephrology consult for ESRD management  ASSESSMENT and PLAN:    End-stage renal disease  -- In center hemodialysis Monday Wednesday Friday at [facility]  -- Access: Left arm AV fistula  --Underwent hemodialysis yesterday  -- Continue to challenge his dry weight  -- Prior dry weight was 103 2 EKG, currently below his dry weight, will likely need to be at a new dry weight  --Plan for isolated ultrafiltration for today     Hypertension  -- Examines volume overloaded  -- Continue current medications which include carvedilol 12 5 mg twice a day, torsemide 100 mg daily  --Currently normotensive had developed hypotension yesterday     Anemia of chronic disease  -- Close to target     Coronary artery disease  -- Multiple stents in place  -- Ejection fraction 30% but a poor study  -- Low suspicion for primary NSTEMI  --Due to improvement of his respiratory status planning to transfer to [address] [place] [address] for cardiac catheterization at a higher level facility      SUBJECTIVE / INTERVAL HISTORY:    No overnight events  Underwent hemodialysis yesterday    Family at bedside    OBJECTIVE:  Current Weight: Weight - Scale: 98 kg (216 lb 0 8 oz)  Vitals:    01/31/23 0400 01/31/23 0500 01/31/23 0700 01/31/23 0820   BP: 108/55 125/60 104/61 124/60   BP Location: Right arm  Right arm    Pulse:  71 71 78   Resp:  20 (!) 26    Temp:   98 °F (36 7 °C)    TempSrc:   Axillary    SpO2:   100%    Weight:       Height:           Intake/Output Summary (Last 24 hours) at 1/31/2023 1134  Last data filed at 1/31/2023 0800  Gross per 24 hour   Intake 843 84 ml   Output 2886 ml   Net -2042 16 ml       Review of Systems:    Constitutional: Negative for chills and fever  HENT: Negative for ear pain and sore throat  Eyes: Negative for pain and visual disturbance  Respiratory: Negative for cough and shortness of breath  Cardiovascular: Negative for chest pain and palpitations  Gastrointestinal: Negative for abdominal pain and vomiting  Genitourinary: Negative for dysuria and hematuria  Musculoskeletal: Negative for arthralgias and back pain  Skin: Negative for color change and rash  Neurological: Negative for seizures and syncope  12 point ROS has been reviewed  Physical Exam  Vitals and nursing note reviewed  Exam conducted with a chaperone present  Constitutional:       General: He is not in acute distress  Appearance: He is well-developed  He is obese  He is ill-appearing  HENT:      Head: Normocephalic and atraumatic  Eyes:      General: No scleral icterus  Conjunctiva/sclera: Conjunctivae normal       Pupils: Pupils are equal, round, and reactive to light  Cardiovascular:      Rate and Rhythm: Normal rate and regular rhythm  Heart sounds: S1 normal and S2 normal  No murmur heard  No friction rub  No gallop  Pulmonary:      Effort: Pulmonary effort is normal  No respiratory distress  Breath sounds: Rales present  No wheezing  Abdominal:      General: Bowel sounds are normal       Palpations: Abdomen is soft  Tenderness: There is no abdominal tenderness  There is no rebound  Musculoskeletal:         General: Normal range of motion  Cervical back: Normal range of motion and neck supple  Skin:     Findings: No rash  Neurological:      Mental Status: He is alert and oriented to person, place, and time     Psychiatric:         Behavior: Behavior normal          Medications:    Current Facility-Administered Medications:   •  aspirin (ECOTRIN LOW STRENGTH) EC tablet 81 mg, 81 mg, Oral, Daily, Payton Villatoro, 81 mg at 01/31/23 0819  •  atorvastatin (LIPITOR) tablet 40 mg, 40 mg, Oral, Daily With Dinner, John Penta  •  calcium acetate (PHOSLO) capsule 667 mg, 667 mg, Oral, TID With Meals, John Penta, 667 mg at 01/31/23 9697  •  carvedilol (COREG) tablet 12 5 mg, 12 5 mg, Oral, BID With Meals, Garrett Penta, 12 5 mg at 01/31/23 0820  •  chlorhexidine (PERIDEX) 0 12 % oral rinse 15 mL, 15 mL, Mouth/Throat, Q12H [address], Garrett Penta, 15 mL at 01/31/23 5375  •  guaiFENesin (ROBITUSSIN) oral liquid 200 mg, 200 mg, Oral, Q4H PRN, Kenji De Souza MD, 200 mg at 01/31/23 0416  •  heparin (porcine) 25,000 units in 0 45% NaCl 250 mL infusion (premix), 3-20 Units/kg/hr (Order-Specific), Intravenous, Titrated, Garrett Penta, Last Rate: 17 2 mL/hr at 01/30/23 2240, 19 1 Units/kg/hr at 01/30/23 2240  •  heparin (porcine) injection 2,000 Units, 2,000 Units, Intravenous, Q6H PRN, Garrett Penta, 2,000 Units at 01/30/23 2236  •  heparin (porcine) injection 4,000 Units, 4,000 Units, Intravenous, Q6H PRN, Garrett Penta, 4,000 Units at 01/30/23 1415  •  insulin lispro (HumaLOG) 100 units/mL subcutaneous injection 1-5 Units, 1-5 Units, Subcutaneous, 4x Daily (AC & HS) **AND** Fingerstick Glucose (POCT), , , 4x Daily AC and at bedtime, Garrett Penta  •  ipratropium (ATROVENT) 0 02 % inhalation solution 0 5 mg, 0 5 mg, Nebulization, TID PRN, John Penta  •  levalbuterol (XOPENEX) inhalation solution 1 25 mg, 1 25 mg, Nebulization, TID PRN, John Penta  •  melatonin tablet 3 mg, 3 mg, Oral, HS, Bill G Daniel, 3 mg at 01/29/23 2247  •  prasugrel (EFFIENT) tablet 5 mg, 5 mg, Oral, Daily, Garrett Penta, 5 mg at 01/31/23 7267  •  torsemide (DEMADEX) tablet 100 mg, 100 mg, Oral, Daily, John Penta, 100 mg at 01/31/23 9467  •  valproate (DEPACON) 250 mg in sodium chloride 0 9 % 50 mL IVPB, 250 mg, Intravenous, Q12H [address], Garrett Penta, Last Rate: 50 mL/hr at 01/31/23 1032, 250 mg at 01/31/23 1032    Laboratory Results:  Results from last 7 days   Lab Units 01/31/23  0521 01/30/23  6146 01/30/23  0458 01/29/23  1415 01/29/23  0821 01/26/23  0453 01/25/23  1958   WBC Thousand/uL 8 33  --  11 00*  --  11 83* 5 48 5 62   HEMOGLOBIN g/dL 9 7*  --  9 6*  --  10 9* 10 1* 10 1*   I STAT HEMOGLOBIN g/dl  --  9 9*  --   --   --   --   --    HEMATOCRIT % 29 9*  --  30 2*  --  35 6* 31 4* 31 5*   HEMATOCRIT, ISTAT %  --  29*  --   --   --   --   --    PLATELETS Thousands/uL 160  --  148* 172 181 140* 129*   POTASSIUM mmol/L 4 3  --  5 2  --  3 5 4 4 3 6   CHLORIDE mmol/L 93*  --  98  --  114* 97 93*   CO2 mmol/L 28  --  25  --  20* 32 33*   CO2, I-STAT mmol/L  --  25  --   --   --   --   --    BUN mg/dL 52*  --  68*  --  36* 29* 23   CREATININE mg/dL 7 29*  --  9 62*  --  5 40* 5 61* 4 50*   CALCIUM mg/dL 8 9  --  8 8  --  6 0* 9 1 9 4   MAGNESIUM mg/dL 3 0*  --   --   --  1 5*  --   --    PHOSPHORUS mg/dL 5 9*  --   --   --  3 0  --   --    GLUCOSE, ISTAT mg/dl  --  142*  --   --   --   --   --        PLEASE NOTE:  This encounter was completed utilizing the [employer] Direct Speech Voice Recognition Software  Grammatical errors, random word insertions, pronoun errors and incomplete sentences are occasional consequences of the system due to software limitations, ambient noise and hardware issues  These may be missed by proof reading prior to affixing electronic signature  Any questions or concerns about the content, text or information contained within the body of this dictation should be directly addressed to the physician for clarification  Please do not hesitate to call me directly if you have any any questions or concerns

## 2023-01-31 NOTE — QUICK NOTE
Alerted by RN for BP 83/49  Patient alert, awake in no acute distress  Says he hurts all over  No respiratory distress  Extremities cool  Lungs CTAB  Receiving 12 5 g albumin currently  Last albumin 4 0  HD ended approximately 2:30 pm per discussion with family at bedside  Aware of fluid restriction 1500 ml  Cumulative I/O: +195 ml  Past 24 hours I/O: +816, -2323: Net -1500 ml    Ordered bolus 500 ml isolyte, recheck BP  However, right before administration of fluids, family noticed an increase in Bps into the low 100s and asked to defer fluids  Therefore communicated with RN to hold fluids at this time  Afterwards called about chest pain  Family says that patient uttered my heart hurts and pointed to center of his chest  When asked where patient hurts, he responds with all-over  I palpate both sides of chest, stomach, shoulders, arms and he signals that he is in pain  Yawns twice during this exam      Will obtain EKG  Random HS troponin  Telemetry at this time NSR 60-70s  Normal sinus rhythm, 68  Stable, known RBBB and T wave abnormalities  Extensively discussed with family tenuous but stable state of patient  Addressed all questions but admitted to uncertain prognosis and that we will not know more until he is transferred to [place] for higher level of care  Attempted to reassure family about blood pressures in the context of Yessi brady, in no acute distress on my reevaluation and notifying the family of stable EKG

## 2023-02-01 ENCOUNTER — HOSPITAL ENCOUNTER (INPATIENT)
Facility: HOSPITAL | Age: 63
LOS: 3 days | Discharge: HOME HEALTH | End: 2023-02-04
Attending: HOSPITALIST | Admitting: INTERNAL MEDICINE

## 2023-02-01 VITALS
OXYGEN SATURATION: 92 % | RESPIRATION RATE: 18 BRPM | TEMPERATURE: 98.2 F | HEIGHT: 69 IN | WEIGHT: 210.32 LBS | DIASTOLIC BLOOD PRESSURE: 70 MMHG | BODY MASS INDEX: 31.15 KG/M2 | SYSTOLIC BLOOD PRESSURE: 126 MMHG | HEART RATE: 68 BPM

## 2023-02-01 DIAGNOSIS — J81.0 ACUTE PULMONARY EDEMA (HCC): ICD-10-CM

## 2023-02-01 DIAGNOSIS — Z95.5 S/P CORONARY ARTERY STENT PLACEMENT: ICD-10-CM

## 2023-02-01 DIAGNOSIS — I21.4 TYPE 1 NON-ST ELEVATION MYOCARDIAL INFARCTION (NSTEMI) (HCC): ICD-10-CM

## 2023-02-01 DIAGNOSIS — R77.8 ELEVATED TROPONIN: ICD-10-CM

## 2023-02-01 DIAGNOSIS — Z99.2 ESRD ON DIALYSIS (HCC): Primary | ICD-10-CM

## 2023-02-01 DIAGNOSIS — I25.10 CORONARY ARTERY DISEASE INVOLVING NATIVE CORONARY ARTERY OF NATIVE HEART WITHOUT ANGINA PECTORIS: ICD-10-CM

## 2023-02-01 DIAGNOSIS — I25.5 ISCHEMIC CARDIOMYOPATHY: ICD-10-CM

## 2023-02-01 DIAGNOSIS — N18.6 ESRD ON DIALYSIS (HCC): Primary | ICD-10-CM

## 2023-02-01 PROBLEM — J96.01 ACUTE RESPIRATORY FAILURE WITH HYPOXIA (HCC): Status: RESOLVED | Noted: 2023-01-29 | Resolved: 2023-02-01

## 2023-02-01 PROBLEM — E87.1 HYPONATREMIA: Status: ACTIVE | Noted: 2023-02-01

## 2023-02-01 LAB
ANION GAP SERPL CALCULATED.3IONS-SCNC: 18 MMOL/L (ref 4–13)
APTT PPP: 31 SECONDS (ref 23–37)
APTT PPP: 46 SECONDS (ref 23–37)
ATRIAL RATE: 68 BPM
ATRIAL RATE: 69 BPM
BASOPHILS # BLD AUTO: 0.03 THOUSANDS/ÂΜL (ref 0–0.1)
BASOPHILS NFR BLD AUTO: 0 % (ref 0–1)
BUN SERPL-MCNC: 89 MG/DL (ref 5–25)
CALCIUM SERPL-MCNC: 8.8 MG/DL (ref 8.4–10.2)
CARDIAC TROPONIN I PNL SERPL HS: 9931 NG/L (ref 8–18)
CHLORIDE SERPL-SCNC: 89 MMOL/L (ref 96–108)
CO2 SERPL-SCNC: 25 MMOL/L (ref 21–32)
CREAT SERPL-MCNC: 9.94 MG/DL (ref 0.6–1.3)
EOSINOPHIL # BLD AUTO: 0.09 THOUSAND/ÂΜL (ref 0–0.61)
EOSINOPHIL NFR BLD AUTO: 1 % (ref 0–6)
ERYTHROCYTE [DISTWIDTH] IN BLOOD BY AUTOMATED COUNT: 15.3 % (ref 11.6–15.1)
GFR SERPL CREATININE-BSD FRML MDRD: 4 ML/MIN/1.73SQ M
GLUCOSE SERPL-MCNC: 108 MG/DL (ref 65–140)
GLUCOSE SERPL-MCNC: 113 MG/DL (ref 65–140)
GLUCOSE SERPL-MCNC: 124 MG/DL (ref 65–140)
GLUCOSE SERPL-MCNC: 98 MG/DL (ref 65–140)
HCT VFR BLD AUTO: 33.7 % (ref 36.5–49.3)
HGB BLD-MCNC: 10.7 G/DL (ref 12–17)
IMM GRANULOCYTES # BLD AUTO: 0.08 THOUSAND/UL (ref 0–0.2)
IMM GRANULOCYTES NFR BLD AUTO: 1 % (ref 0–2)
KCT BLD-ACNC: 320 SEC (ref 89–137)
LDLC SERPL DIRECT ASSAY-MCNC: 44 MG/DL (ref 0–100)
LYMPHOCYTES # BLD AUTO: 1.34 THOUSANDS/ÂΜL (ref 0.6–4.47)
LYMPHOCYTES NFR BLD AUTO: 16 % (ref 14–44)
MAGNESIUM SERPL-MCNC: 3.2 MG/DL (ref 1.9–2.7)
MCH RBC QN AUTO: 30.1 PG (ref 26.8–34.3)
MCHC RBC AUTO-ENTMCNC: 31.8 G/DL (ref 31.4–37.4)
MCV RBC AUTO: 95 FL (ref 82–98)
MONOCYTES # BLD AUTO: 0.91 THOUSAND/ÂΜL (ref 0.17–1.22)
MONOCYTES NFR BLD AUTO: 11 % (ref 4–12)
NEUTROPHILS # BLD AUTO: 5.82 THOUSANDS/ÂΜL (ref 1.85–7.62)
NEUTS SEG NFR BLD AUTO: 71 % (ref 43–75)
NRBC BLD AUTO-RTO: 0 /100 WBCS
P AXIS: -8 DEGREES
P AXIS: 50 DEGREES
PLATELET # BLD AUTO: 180 THOUSANDS/UL (ref 149–390)
PMV BLD AUTO: 10.3 FL (ref 8.9–12.7)
POTASSIUM SERPL-SCNC: 4.4 MMOL/L (ref 3.5–5.3)
PR INTERVAL: 154 MS
PR INTERVAL: 156 MS
QRS AXIS: -24 DEGREES
QRS AXIS: -4 DEGREES
QRSD INTERVAL: 150 MS
QRSD INTERVAL: 150 MS
QT INTERVAL: 492 MS
QT INTERVAL: 494 MS
QTC INTERVAL: 525 MS
QTC INTERVAL: 527 MS
RBC # BLD AUTO: 3.55 MILLION/UL (ref 3.88–5.62)
SODIUM SERPL-SCNC: 132 MMOL/L (ref 135–147)
SPECIMEN SOURCE: ABNORMAL
T WAVE AXIS: 133 DEGREES
T WAVE AXIS: 5 DEGREES
VENTRICULAR RATE: 68 BPM
VENTRICULAR RATE: 69 BPM
WBC # BLD AUTO: 8.27 THOUSAND/UL (ref 4.31–10.16)

## 2023-02-01 PROCEDURE — B2111ZZ FLUOROSCOPY OF MULTIPLE CORONARY ARTERIES USING LOW OSMOLAR CONTRAST: ICD-10-PCS | Performed by: INTERNAL MEDICINE

## 2023-02-01 PROCEDURE — 027034Z DILATION OF CORONARY ARTERY, ONE ARTERY WITH DRUG-ELUTING INTRALUMINAL DEVICE, PERCUTANEOUS APPROACH: ICD-10-PCS | Performed by: INTERNAL MEDICINE

## 2023-02-01 DEVICE — IMPLANTABLE DEVICE
Type: IMPLANTABLE DEVICE | Site: CORONARY | Status: FUNCTIONAL
Brand: ORSIRO MISSION

## 2023-02-01 RX ORDER — LANOLIN ALCOHOL/MO/W.PET/CERES
3 CREAM (GRAM) TOPICAL
Status: CANCELLED | OUTPATIENT
Start: 2023-02-01

## 2023-02-01 RX ORDER — CLOPIDOGREL BISULFATE 75 MG/1
600 TABLET ORAL ONCE
Status: COMPLETED | OUTPATIENT
Start: 2023-02-01 | End: 2023-02-01

## 2023-02-01 RX ORDER — ACETAMINOPHEN 325 MG/1
975 TABLET ORAL EVERY 8 HOURS PRN
Status: DISCONTINUED | OUTPATIENT
Start: 2023-02-01 | End: 2023-02-04 | Stop reason: HOSPADM

## 2023-02-01 RX ORDER — TORSEMIDE 100 MG/1
100 TABLET ORAL DAILY
Status: CANCELLED | OUTPATIENT
Start: 2023-02-01

## 2023-02-01 RX ORDER — ASPIRIN 81 MG/1
81 TABLET ORAL DAILY
Status: DISCONTINUED | OUTPATIENT
Start: 2023-02-02 | End: 2023-02-04 | Stop reason: HOSPADM

## 2023-02-01 RX ORDER — CLOPIDOGREL BISULFATE 75 MG/1
75 TABLET ORAL DAILY
Status: DISCONTINUED | OUTPATIENT
Start: 2023-02-02 | End: 2023-02-01

## 2023-02-01 RX ORDER — CARVEDILOL 12.5 MG/1
12.5 TABLET ORAL 2 TIMES DAILY WITH MEALS
Status: DISCONTINUED | OUTPATIENT
Start: 2023-02-01 | End: 2023-02-03

## 2023-02-01 RX ORDER — ASPIRIN 81 MG/1
TABLET, CHEWABLE ORAL CODE/TRAUMA/SEDATION MEDICATION
Status: DISCONTINUED | OUTPATIENT
Start: 2023-02-01 | End: 2023-02-01 | Stop reason: HOSPADM

## 2023-02-01 RX ORDER — PRASUGREL 10 MG/1
5 TABLET, FILM COATED ORAL DAILY
Status: DISCONTINUED | OUTPATIENT
Start: 2023-02-02 | End: 2023-02-04 | Stop reason: HOSPADM

## 2023-02-01 RX ORDER — INSULIN LISPRO 100 [IU]/ML
1-5 INJECTION, SOLUTION INTRAVENOUS; SUBCUTANEOUS
Status: CANCELLED | OUTPATIENT
Start: 2023-02-01

## 2023-02-01 RX ORDER — ATORVASTATIN CALCIUM 40 MG/1
40 TABLET, FILM COATED ORAL
Status: DISCONTINUED | OUTPATIENT
Start: 2023-02-01 | End: 2023-02-04 | Stop reason: HOSPADM

## 2023-02-01 RX ORDER — INSULIN LISPRO 100 [IU]/ML
1-5 INJECTION, SOLUTION INTRAVENOUS; SUBCUTANEOUS
Status: DISCONTINUED | OUTPATIENT
Start: 2023-02-01 | End: 2023-02-04 | Stop reason: HOSPADM

## 2023-02-01 RX ORDER — NITROGLYCERIN 0.4 MG/1
0.4 TABLET SUBLINGUAL
Status: DISCONTINUED | OUTPATIENT
Start: 2023-02-01 | End: 2023-02-01 | Stop reason: HOSPADM

## 2023-02-01 RX ORDER — ASPIRIN 81 MG/1
81 TABLET ORAL DAILY
Status: CANCELLED | OUTPATIENT
Start: 2023-02-01

## 2023-02-01 RX ORDER — NITROGLYCERIN 20 MG/100ML
INJECTION INTRAVENOUS CODE/TRAUMA/SEDATION MEDICATION
Status: DISCONTINUED | OUTPATIENT
Start: 2023-02-01 | End: 2023-02-01 | Stop reason: HOSPADM

## 2023-02-01 RX ORDER — GUAIFENESIN 100 MG/5ML
200 SOLUTION ORAL EVERY 4 HOURS PRN
Status: CANCELLED | OUTPATIENT
Start: 2023-02-01

## 2023-02-01 RX ORDER — CALCIUM ACETATE 667 MG/1
667 CAPSULE ORAL
Status: CANCELLED | OUTPATIENT
Start: 2023-02-01

## 2023-02-01 RX ORDER — HEPARIN SODIUM 5000 [USP'U]/ML
5000 INJECTION, SOLUTION INTRAVENOUS; SUBCUTANEOUS EVERY 8 HOURS SCHEDULED
Status: DISCONTINUED | OUTPATIENT
Start: 2023-02-01 | End: 2023-02-04 | Stop reason: HOSPADM

## 2023-02-01 RX ORDER — HEPARIN SODIUM 1000 [USP'U]/ML
2000 INJECTION, SOLUTION INTRAVENOUS; SUBCUTANEOUS EVERY 6 HOURS PRN
Status: CANCELLED | OUTPATIENT
Start: 2023-02-01

## 2023-02-01 RX ORDER — HEPARIN SODIUM 1000 [USP'U]/ML
4000 INJECTION, SOLUTION INTRAVENOUS; SUBCUTANEOUS EVERY 6 HOURS PRN
Status: DISCONTINUED | OUTPATIENT
Start: 2023-02-01 | End: 2023-02-01

## 2023-02-01 RX ORDER — VERAPAMIL HCL 2.5 MG/ML
AMPUL (ML) INTRAVENOUS CODE/TRAUMA/SEDATION MEDICATION
Status: DISCONTINUED | OUTPATIENT
Start: 2023-02-01 | End: 2023-02-01 | Stop reason: HOSPADM

## 2023-02-01 RX ORDER — ACETAMINOPHEN 325 MG/1
975 TABLET ORAL EVERY 8 HOURS PRN
Status: CANCELLED | OUTPATIENT
Start: 2023-02-01

## 2023-02-01 RX ORDER — LEVALBUTEROL 1.25 MG/.5ML
1.25 SOLUTION, CONCENTRATE RESPIRATORY (INHALATION) 3 TIMES DAILY PRN
Status: CANCELLED | OUTPATIENT
Start: 2023-02-01

## 2023-02-01 RX ORDER — LIDOCAINE HYDROCHLORIDE 10 MG/ML
INJECTION, SOLUTION EPIDURAL; INFILTRATION; INTRACAUDAL; PERINEURAL CODE/TRAUMA/SEDATION MEDICATION
Status: DISCONTINUED | OUTPATIENT
Start: 2023-02-01 | End: 2023-02-01 | Stop reason: HOSPADM

## 2023-02-01 RX ORDER — ALBUMIN (HUMAN) 12.5 G/50ML
25 SOLUTION INTRAVENOUS ONCE
Status: COMPLETED | OUTPATIENT
Start: 2023-02-01 | End: 2023-02-02

## 2023-02-01 RX ORDER — NITROGLYCERIN 0.4 MG/1
0.4 TABLET SUBLINGUAL
Status: DISCONTINUED | OUTPATIENT
Start: 2023-02-01 | End: 2023-02-04 | Stop reason: HOSPADM

## 2023-02-01 RX ORDER — TORSEMIDE 20 MG/1
100 TABLET ORAL DAILY
Status: DISCONTINUED | OUTPATIENT
Start: 2023-02-02 | End: 2023-02-03

## 2023-02-01 RX ORDER — GUAIFENESIN 100 MG/5ML
200 SOLUTION ORAL EVERY 4 HOURS PRN
Status: DISCONTINUED | OUTPATIENT
Start: 2023-02-01 | End: 2023-02-04 | Stop reason: HOSPADM

## 2023-02-01 RX ORDER — CHLORHEXIDINE GLUCONATE 0.12 MG/ML
15 RINSE ORAL EVERY 12 HOURS SCHEDULED
Status: DISCONTINUED | OUTPATIENT
Start: 2023-02-01 | End: 2023-02-04 | Stop reason: HOSPADM

## 2023-02-01 RX ORDER — LEVALBUTEROL 1.25 MG/.5ML
1.25 SOLUTION, CONCENTRATE RESPIRATORY (INHALATION) 3 TIMES DAILY PRN
Status: DISCONTINUED | OUTPATIENT
Start: 2023-02-01 | End: 2023-02-01

## 2023-02-01 RX ORDER — FENTANYL CITRATE 50 UG/ML
INJECTION, SOLUTION INTRAMUSCULAR; INTRAVENOUS CODE/TRAUMA/SEDATION MEDICATION
Status: DISCONTINUED | OUTPATIENT
Start: 2023-02-01 | End: 2023-02-01 | Stop reason: HOSPADM

## 2023-02-01 RX ORDER — ATORVASTATIN CALCIUM 40 MG/1
40 TABLET, FILM COATED ORAL
Status: CANCELLED | OUTPATIENT
Start: 2023-02-01

## 2023-02-01 RX ORDER — LANOLIN ALCOHOL/MO/W.PET/CERES
3 CREAM (GRAM) TOPICAL
Status: DISCONTINUED | OUTPATIENT
Start: 2023-02-01 | End: 2023-02-04 | Stop reason: HOSPADM

## 2023-02-01 RX ORDER — ASPIRIN 81 MG/1
324 TABLET, CHEWABLE ORAL ONCE
Status: CANCELLED | OUTPATIENT
Start: 2023-02-01 | End: 2023-02-01

## 2023-02-01 RX ORDER — NITROGLYCERIN 0.4 MG/1
0.4 TABLET SUBLINGUAL
Status: CANCELLED | OUTPATIENT
Start: 2023-02-01

## 2023-02-01 RX ORDER — CHLORHEXIDINE GLUCONATE 0.12 MG/ML
15 RINSE ORAL EVERY 12 HOURS SCHEDULED
Status: CANCELLED | OUTPATIENT
Start: 2023-02-01

## 2023-02-01 RX ORDER — HEPARIN SODIUM 1000 [USP'U]/ML
4000 INJECTION, SOLUTION INTRAVENOUS; SUBCUTANEOUS EVERY 6 HOURS PRN
Status: CANCELLED | OUTPATIENT
Start: 2023-02-01

## 2023-02-01 RX ORDER — CARVEDILOL 12.5 MG/1
12.5 TABLET ORAL 2 TIMES DAILY WITH MEALS
Status: CANCELLED | OUTPATIENT
Start: 2023-02-01

## 2023-02-01 RX ORDER — CALCIUM ACETATE 667 MG/1
667 CAPSULE ORAL
Status: DISCONTINUED | OUTPATIENT
Start: 2023-02-01 | End: 2023-02-04 | Stop reason: HOSPADM

## 2023-02-01 RX ORDER — HEPARIN SODIUM 10000 [USP'U]/100ML
3-20 INJECTION, SOLUTION INTRAVENOUS
Status: CANCELLED | OUTPATIENT
Start: 2023-02-01

## 2023-02-01 RX ORDER — HEPARIN SODIUM 1000 [USP'U]/ML
2000 INJECTION, SOLUTION INTRAVENOUS; SUBCUTANEOUS EVERY 6 HOURS PRN
Status: DISCONTINUED | OUTPATIENT
Start: 2023-02-01 | End: 2023-02-01

## 2023-02-01 RX ORDER — HEPARIN SODIUM 1000 [USP'U]/ML
INJECTION, SOLUTION INTRAVENOUS; SUBCUTANEOUS CODE/TRAUMA/SEDATION MEDICATION
Status: DISCONTINUED | OUTPATIENT
Start: 2023-02-01 | End: 2023-02-01 | Stop reason: HOSPADM

## 2023-02-01 RX ORDER — PRASUGREL 10 MG/1
5 TABLET, FILM COATED ORAL DAILY
Status: CANCELLED | OUTPATIENT
Start: 2023-02-01

## 2023-02-01 RX ORDER — MIDAZOLAM HYDROCHLORIDE 2 MG/2ML
INJECTION, SOLUTION INTRAMUSCULAR; INTRAVENOUS CODE/TRAUMA/SEDATION MEDICATION
Status: DISCONTINUED | OUTPATIENT
Start: 2023-02-01 | End: 2023-02-01 | Stop reason: HOSPADM

## 2023-02-01 RX ORDER — HEPARIN SODIUM 10000 [USP'U]/100ML
3-20 INJECTION, SOLUTION INTRAVENOUS
Status: DISCONTINUED | OUTPATIENT
Start: 2023-02-01 | End: 2023-02-01

## 2023-02-01 RX ORDER — SODIUM CHLORIDE 9 MG/ML
INJECTION, SOLUTION INTRAVENOUS
Status: COMPLETED | OUTPATIENT
Start: 2023-02-01 | End: 2023-02-01

## 2023-02-01 RX ADMIN — HEPARIN SODIUM 18.1 UNITS/KG/HR: 10000 INJECTION, SOLUTION INTRAVENOUS at 06:18

## 2023-02-01 RX ADMIN — VALPROATE SODIUM 250 MG: 100 INJECTION, SOLUTION INTRAVENOUS at 12:49

## 2023-02-01 RX ADMIN — VALPROATE SODIUM 250 MG: 100 INJECTION, SOLUTION INTRAVENOUS at 00:51

## 2023-02-01 RX ADMIN — HEPARIN SODIUM 5000 UNITS: 5000 INJECTION INTRAVENOUS; SUBCUTANEOUS at 21:50

## 2023-02-01 RX ADMIN — ALBUMIN (HUMAN) 25 G: 0.25 INJECTION, SOLUTION INTRAVENOUS at 22:13

## 2023-02-01 RX ADMIN — CLOPIDOGREL BISULFATE 600 MG: 75 TABLET ORAL at 02:10

## 2023-02-01 RX ADMIN — VALPROATE SODIUM 250 MG: 100 INJECTION, SOLUTION INTRAVENOUS at 21:35

## 2023-02-01 RX ADMIN — HEPARIN SODIUM 18.1 UNITS/KG/HR: 10000 INJECTION, SOLUTION INTRAVENOUS at 10:58

## 2023-02-01 RX ADMIN — HEPARIN SODIUM 4000 UNITS: 1000 INJECTION INTRAVENOUS; SUBCUTANEOUS at 13:31

## 2023-02-01 NOTE — PROGRESS NOTES
Verbal report to be called to RN  Patient prepared for transport via stretcher on the monitor to the CCL holding area for post vitals and MD orders  VSS  Right wrist dry and intact with 12ml of air  Patient offering no complaints at this time  Dr Fahad Vallecillo spoke with the patient and family post procedure  Site to be verified with receiving RN  Any changes from the above assessment will be documented by receiving RN

## 2023-02-01 NOTE — PROGRESS NOTES
General Cardiology   Progress Note -  Team One   Shin Arias 58 y o  male MRN: [id number]  Unit/Bed#: [facility] 201-01 Encounter: [id number]    Assessment  Presented to [facility] on 1/29 with c/o CP and SOB  Drank 10 bottles of water the day prior to admission  Volume overloaded and hypoxic, improved with HD and now on room air  Troponin trended up to over 16,000  TTE showed drop in EF to 25-30%; does not appear he had an echo done after his most recent interventions on the LAD in [place]  Chest pain free today and transferred to [facility] AND [facility] for cardiac catheterization  1  Chest pain with elevated troponin  HS troponin 4037-->7138-->35987-->16,894-->8884  ECG- NSR with RBBB, inferolateral T wave abnormality  Chest pain improved with volume removal via HD  Had some chest pain yesterday when hypotensive post HD, now resolved with normotension  TTE showed drop in LV function to 25-30% with regional variation  2  MVCAD with multiple prior interventions  5/2/2022 [facility]: LAD-50% occlusion, LCx-50% occlusion, first obtuse marginal artery off of circumflex was 95% occluded and is now s/p ADE, RCA with 50% occlusion, right posterior AV artery with 90% occlusion             6/9/2022 [facility]:  prox LAD 70%, mid LAD 80%, mid Cx 70%, dist RCA 80%, RPDA-80%, RPAV-99%  Successful PCI to LAD & mid Cx      6/27/2022 [facility]: plan for staged PCI of RCA however showed non occlusive thrombus of LAD stent in setting of non compliance with DAPT  Underwent successful thrombectomy  RCA PCI re-scheduled  Switched from ASA and Plavix to ASA and ticagrelor      8/5/2022 [facility]: brought back for planned PCI of RCA  However, prox-mid LAD showed non-occlusive thrombus treated with balloon angioplasty  This again occurred in the setting of noncompliance with DAPT  mCx stent patent, OM1 70%, distal RCA 70%, assessed by IFR, RPDA 80%  Switched to ASA and Effient  Family has forced compliance at this point    Staged PCI of RPL was no longer recommended since small territory    3  Acute on chronic HFrEF  Home diuretic: torsemide 100 mg daily  Previous dry weight: 225 lb  Down to 210 lb with HD this hospital stay  Weaned to room air, euvolemic on exam     4  New cardiomyopathy, LVEF 25-30%  TTE 1/30/23: LVEF 25-30% with regional variation, moderate AS, moderate MR  Likely ischemic  GDMT- carvedilol 12 5 mg BID    5  Moderate AS- unchanged from prior echo last year    6  ESRD on HD- nephrology following    7  HLD- TC 76, , HDL 30, LDL 21  On atorvastatin 40 mg daily    8  HTN- controlled on carvedilol 12 5 mg BID, 24 hour average /57    9  Type 2 DM- A1c 5 5%    10  Hx of L MCA CVA    Plan  1  Cardiac catheterization today, keep NPO  2  Continue IV heparin, ASA and prasugrel  3  GDMT with carvedilol, add afterload reduction prior to discharge  4  Volume status stable, managed by nephrology  Plan for HD tomorrow without removing additional volume  Dry weight adjusted  5  Strict I/Os, daily standing weights  6  Continue statin     Subjective  Review of Systems   Constitutional: Negative for chills, malaise/fatigue and weight gain  Cardiovascular: Negative for chest pain, dyspnea on exertion, leg swelling, orthopnea, palpitations and syncope  Respiratory: Negative for cough, shortness of breath, sleep disturbances due to breathing and sputum production  Gastrointestinal: Negative for bloating, nausea and vomiting  Neurological: Negative for dizziness, light-headedness and weakness  Psychiatric/Behavioral: Negative for altered mental status  All other systems reviewed and are negative  Objective:   Vitals: Blood pressure 107/62, pulse 70, temperature 98 1 °F (36 7 °C), temperature source Oral, resp  rate 18, SpO2 96 %  , There is no height or weight on file to calculate BMI ,     Systolic (22DON), QSA:806 , Min:62 , MOW:073     Diastolic (38THX), IPM:46, Min:36, Max:73    No intake or output data in the 24 hours ending 02/01/23 1123  Wt Readings from Last 3 Encounters:   02/01/23 95 4 kg (210 lb 5 1 oz)   01/26/23 100 kg (221 lb)   01/13/23 103 kg (228 lb)       Telemetry Review: No significant arrhythmias seen on telemetry review  NSR with RBBB    EKG personally reviewed by BRITTANY Sequeira  1/31 NSR with RBBB and inferolateral T wave abnormality    Physical Exam  Vitals reviewed  Constitutional:       General: He is not in acute distress  Appearance: He is obese  Neck:      Vascular: No hepatojugular reflux or JVD  Cardiovascular:      Rate and Rhythm: Normal rate and regular rhythm  Heart sounds: Normal heart sounds  No murmur heard  No friction rub  No gallop  Pulmonary:      Effort: Pulmonary effort is normal  No respiratory distress  Breath sounds: No rales  Comments: Diminished at bases, room air  Abdominal:      General: Bowel sounds are normal  There is no distension  Palpations: Abdomen is soft  Tenderness: There is no abdominal tenderness  Musculoskeletal:         General: No tenderness  Normal range of motion  Cervical back: Neck supple  Right lower leg: No edema  Left lower leg: No edema  Skin:     General: Skin is warm and dry  Findings: No erythema  Neurological:      Mental Status: He is alert and oriented to person, place, and time     Psychiatric:      Comments: Tearful at times     LABORATORY RESULTS      CBC with diff:   Results from last 7 days   Lab Units 02/01/23  0729 01/31/23  0521 01/30/23  1505 01/30/23  0458 01/29/23  1415 01/29/23  0821 01/26/23  0453 01/25/23  1958   WBC Thousand/uL 8 27 8 33  --  11 00*  --  11 83* 5 48 5 62   HEMOGLOBIN g/dL 10 7* 9 7*  --  9 6*  --  10 9* 10 1* 10 1*   I STAT HEMOGLOBIN g/dl  --   --  9 9*  --   --   --   --   --    HEMATOCRIT % 33 7* 29 9*  --  30 2*  --  35 6* 31 4* 31 5*   HEMATOCRIT, ISTAT %  --   --  29*  --   --   --   --   --    MCV fL 95 95  --  97  --  98 95 94   PLATELETS Thousands/uL 180 160  --  148* 172 181 140* 129*   MCH pg 30 1 30 9  --  30 8  --  29 9 30 7 30 1   MCHC g/dL 31 8 32 4  --  31 8  --  30 6* 32 2 32 1   RDW % 15 3* 15 6*  --  15 6*  --  15 2* 15 0 15 0   MPV fL 10 3 10 2  --  10 6 9 8 10 3 10 3 10 0   NRBC AUTO /100 WBCs 0  --   --  0  --  0  --  0     CMP:  Results from last 7 days   Lab Units 02/01/23  0729 01/31/23  0521 01/30/23  1505 01/30/23  0458 01/29/23  0821 01/26/23 0453 01/25/23 1958   POTASSIUM mmol/L 4 4 4 3  --  5 2 3 5 4 4 3 6   CHLORIDE mmol/L 89* 93*  --  98 114* 97 93*   CO2 mmol/L 25 28  --  25 20* 32 33*   CO2, I-STAT mmol/L  --   --  25  --   --   --   --    BUN mg/dL 89* 52*  --  68* 36* 29* 23   CREATININE mg/dL 9 94* 7 29*  --  9 62* 5 40* 5 61* 4 50*   GLUCOSE, ISTAT mg/dl  --   --  142*  --   --   --   --    CALCIUM mg/dL 8 8 8 9  --  8 8 6 0* 9 1 9 4   AST U/L  --  21  --  27 13  --  11*   ALT U/L  --  13  --  13 7  --  10   ALK PHOS U/L  --  56  --  60 51  --  81   EGFR ml/min/1 73sq m 4 7  --  5 10 9 13     BMP:  Results from last 7 days   Lab Units 02/01/23  0729 01/31/23  0521 01/30/23  1505 01/30/23  0458 01/29/23  0821 01/26/23 0453 01/25/23 1958   POTASSIUM mmol/L 4 4 4 3  --  5 2 3 5 4 4 3 6   CHLORIDE mmol/L 89* 93*  --  98 114* 97 93*   CO2 mmol/L 25 28  --  25 20* 32 33*   CO2, I-STAT mmol/L  --   --  25  --   --   --   --    BUN mg/dL 89* 52*  --  68* 36* 29* 23   CREATININE mg/dL 9 94* 7 29*  --  9 62* 5 40* 5 61* 4 50*   GLUCOSE, ISTAT mg/dl  --   --  142*  --   --   --   --    CALCIUM mg/dL 8 8 8 9  --  8 8 6 0* 9 1 9 4     Lab Results   Component Value Date    CREATININE 9 94 (H) 02/01/2023    CREATININE 7 29 (H) 01/31/2023    CREATININE 9 62 (H) 01/30/2023     Lab Results   Component Value Date    NTBNP 1,859 (H) 01/02/2022    NTBNP 1,259 (H) 12/30/2020    NTBNP 1,579 (H) 12/26/2020      Results from last 7 days   Lab Units 02/01/23  0729 01/31/23  0521 01/29/23  0821   MAGNESIUM mg/dL 3 2* 3 0* 1 5*     Results from last 7 days   Lab Units 01/29/23  5401 TSH 3RD GENERATON uIU/mL 1 966     Results from last 7 days   Lab Units 23  0821   INR  1 03     Lipid Profile:   Lab Results   Component Value Date    CHOL 203 (H) 08/10/2016     Lab Results   Component Value Date    HDL 30 (L) 2023    HDL 33 (L) 2022    HDL 31 (L) 2022     Lab Results   Component Value Date    LDLCALC 21 2023    LDLCALC 19 2022    LDLCALC 25 2022     Lab Results   Component Value Date    TRIG 123 2023    TRIG 151 (H) 2022    TRIG 182 (H) 2022       Cardiac testing:   Results for orders placed during the hospital encounter of 10/05/20    Echo complete with contrast if indicated    Narrative  [place] [id number], [address] [place] [address]  [phone]    Transthoracic Echocardiogram  2D, M-mode, Doppler, and Color Doppler    Study date:  06-Oct-2020    Patient: Roberta Chapman  MR number: [id number]  Account number: [de-identified]  : 1960  Age: 61 years  Gender: Male  Status: Inpatient  Location: Bedside  Height: 70 in  Weight: 239 6 lb  BP: 165/ 80 mmHg    Indications: Shortness of breath  Diagnoses: R06 02 - Shortness of breath    Sonographer:  Emilee Urbina RDCS  Primary Physician:  Morgan Pruett DO  Referring Physician:  Hanny Silva MD  Group:  [facility]  Interpreting Physician:  Scott Roblero MD    SUMMARY    LEFT VENTRICLE:  Systolic function was normal by visual assessment  Ejection fraction was estimated to be 60 %  There were no regional wall motion abnormalities  Wall thickness was moderately increased  Features were consistent with a pseudonormal left ventricular filling pattern, with concomitant abnormal relaxation and increased filling pressure (grade 2 diastolic dysfunction)  LEFT ATRIUM:  The atrium was mildly dilated  RIGHT ATRIUM:  The atrium was mildly dilated  MITRAL VALVE:  There was moderate annular calcification  There was mild regurgitation      AORTIC VALVE:  The valve was trileaflet  Leaflets exhibited normal thickness, moderate calcification, and moderately reduced cuspal separation  Transaortic velocity was increased due to valvular stenosis  There was mild to moderate stenosis  There was mild regurgitation  TRICUSPID VALVE:  There was trace regurgitation  PULMONIC VALVE:  There was mild regurgitation  HISTORY: PRIOR HISTORY: DM2  CKD4  Hypertension  CVA  GERD  Dementia  PROCEDURE: The procedure was performed at the bedside  This was a routine study  The transthoracic approach was used  The study included complete 2D imaging, M-mode, complete spectral Doppler, and color Doppler  The heart rate was 98 bpm,  at the start of the study  Image quality was adequate  LEFT VENTRICLE: Size was normal  Systolic function was normal by visual assessment  Ejection fraction was estimated to be 60 %  There were no regional wall motion abnormalities  Wall thickness was moderately increased  DOPPLER: The ratio  of early ventricular filling to atrial contraction velocities was within the normal range  Features were consistent with a pseudonormal left ventricular filling pattern, with concomitant abnormal relaxation and increased filling pressure  (grade 2 diastolic dysfunction)  RIGHT VENTRICLE: The size was normal  Systolic function was normal  DOPPLER: Systolic pressure was not estimated  LEFT ATRIUM: The atrium was mildly dilated  RIGHT ATRIUM: The atrium was mildly dilated  MITRAL VALVE: There was moderate annular calcification  Valve structure was normal  There was normal leaflet separation  DOPPLER: The transmitral velocity was within the normal range  There was no evidence for stenosis  There was mild  regurgitation  AORTIC VALVE: The valve was trileaflet  Leaflets exhibited normal thickness, moderate calcification, and moderately reduced cuspal separation  DOPPLER: Transaortic velocity was increased due to valvular stenosis   There was mild to moderate  stenosis  There was mild regurgitation  TRICUSPID VALVE: The valve structure was normal  There was normal leaflet separation  DOPPLER: The transtricuspid velocity was within the normal range  There was no evidence for stenosis  There was trace regurgitation  PULMONIC VALVE: Leaflets exhibited normal thickness, no calcification, and normal cuspal separation  DOPPLER: The transpulmonic velocity was within the normal range  There was mild regurgitation  PERICARDIUM: There was no pericardial effusion  AORTA: The root exhibited normal size  SYSTEMIC VEINS: IVC: The inferior vena cava was normal in size and course  Respirophasic changes were normal     SYSTEM MEASUREMENT TABLES    2D  %FS: 36 09 %  Ao Diam: 3 8 cm  EDV(Teich): 182 77 ml  EF(Teich): 64 8 %  ESV(Teich): 64 33 ml  IVSd: 1 57 cm  LA Area: 24 31 cm2  LA Diam: 4 63 cm  LVEDV MOD A4C: 192 34 ml  LVEF MOD A4C: 65 32 %  LVESV MOD A4C: 66 7 ml  LVIDd: 6 04 cm  LVIDs: 3 86 cm  LVLd A4C: 9 02 cm  LVLs A4C: 7 15 cm  LVOT Diam: 2 21 cm  LVPWd: 1 61 cm  RA Area: 17 56 cm2  RVIDd: 4 cm  SV MOD A4C: 125 64 ml  SV(Teich): 118 44 ml    CW  AV Env  Ti: 330 16 ms  AV MaxP 64 mmHg  AV VTI: 57 9 cm  AV Vmax: 2 58 m/s  AV Vmean: 1 76 m/s  AV meanP 38 mmHg  TR MaxP 37 mmHg  TR Vmax: 2 71 m/s    MM  TAPSE: 1 84 cm    PW  FATUMA (VTI): 1 27 cm2  FATUMA Vmax: 1 42 cm2  AVAI (VTI): 0 cm2/m2  AVAI Vmax: 0 cm2/m2  E' Sept: 0 07 m/s  E/E' Sept: 12 69  LVOT Env  Ti: 335 87 ms  LVOT VTI: 19 11 cm  LVOT Vmax: 0 95 m/s  LVOT Vmean: 0 57 m/s  LVOT maxPG: 3 63 mmHg  LVOT meanP 61 mmHg  LVSI Dopp: 32 42 ml/m2  LVSV Dopp: 73 27 ml  MV A Carlin: 0 64 m/s  MV Dec [place]: 4 7 m/s2  MV DecT: 189 67 ms  MV E Carlin: 0 89 m/s  MV E/A Ratio: 1 38  MV PHT: 55 01 ms  MVA By PHT: 4 cm2    [address] Accredited [facility]    Prepared and electronically signed by    Donato Zayas MD  Signed 06-Oct-2020 16:31:49    Meds/Allergies   all current active meds have been reviewed and current meds:   Current Facility-Administered Medications   Medication Dose Route Frequency   • acetaminophen (TYLENOL) tablet 975 mg  975 mg Oral Q8H PRN   • [START ON 2/2/2023] aspirin (ECOTRIN LOW STRENGTH) EC tablet 81 mg  81 mg Oral Daily   • atorvastatin (LIPITOR) tablet 40 mg  40 mg Oral Daily With Dinner   • calcium acetate (PHOSLO) capsule 667 mg  667 mg Oral TID With Meals   • carvedilol (COREG) tablet 12 5 mg  12 5 mg Oral BID With Meals   • chlorhexidine (PERIDEX) 0 12 % oral rinse 15 mL  15 mL Mouth/Throat Q12H MICH   • guaiFENesin (ROBITUSSIN) oral liquid 200 mg  200 mg Oral Q4H PRN   • heparin (porcine) 25,000 units in 0 45% NaCl 250 mL infusion (premix)  3-20 Units/kg/hr (Order-Specific) Intravenous Titrated   • heparin (porcine) injection 2,000 Units  2,000 Units Intravenous Q6H PRN   • heparin (porcine) injection 4,000 Units  4,000 Units Intravenous Q6H PRN   • insulin lispro (HumaLOG) 100 units/mL subcutaneous injection 1-5 Units  1-5 Units Subcutaneous 4x Daily (AC & HS)   • ipratropium (ATROVENT) 0 02 % inhalation solution 0 5 mg  0 5 mg Nebulization TID PRN   • levalbuterol (XOPENEX) inhalation solution 1 25 mg  1 25 mg Nebulization TID PRN   • melatonin tablet 3 mg  3 mg Oral HS   • nitroglycerin (NITROSTAT) SL tablet 0 4 mg  0 4 mg Sublingual Q5 Min PRN   • [START ON 2/2/2023] prasugrel (EFFIENT) tablet 5 mg  5 mg Oral Daily   • [START ON 2/2/2023] torsemide (DEMADEX) tablet 100 mg  100 mg Oral Daily   • valproate (DEPACON) 250 mg in sodium chloride 0 9 % 50 mL IVPB  250 mg Intravenous Q12H [address]     Medications Prior to Admission   Medication   • aspirin (ECOTRIN LOW STRENGTH) 81 mg EC tablet   • atorvastatin (LIPITOR) 40 mg tablet   • Blood Glucose Monitoring Suppl (True Metrix Meter) w/Device KIT   • Blood Pressure Monitoring (BLOOD PRESSURE CUFF) MISC   • calcium acetate (CALPHRON) 667 mg   • carvedilol (COREG) 12 5 mg tablet   • Cholecalciferol (Vitamin D3) 1 25 MG (00119 UT) CAPS   • divalproex sodium (DEPAKOTE SPRINKLE) 125 MG capsule   • glucose blood (True Metrix Blood Glucose Test) test strip   • insulin glargine (Lantus) 100 units/mL subcutaneous injection   • insulin lispro (HumaLOG KwikPen) 100 units/mL injection pen   • Insulin Pen Needle (BD Pen Needle Adina U/F) 32G X 4 MM MISC   • Insulin Syringe-Needle U-100 (B-D INS SYRINGE 0 5CC/30GX1/2") 30G X 1/2" 0 5 ML MISC   • Lancets Ultra Thin 30G MISC   • Methoxy PEG-Epoetin Beta 30 MCG/0 3ML SOSY   • ONETOUCH DELICA LANCETS 54M MISC   • prasugrel (EFFIENT) tablet   • torsemide (DEMADEX) 100 mg tablet     heparin (porcine), 3-20 Units/kg/hr (Order-Specific)    Counseling / Coordination of Care  Total floor / unit time spent today 20 minutes  Greater than 50% of total time was spent with the patient and / or family counseling and / or coordination of care  ** Please Note: Dragon 360 Dictation voice to text software may have been used in the creation of this document   **

## 2023-02-01 NOTE — ASSESSMENT & PLAN NOTE
· POA with sudden onset of SOB with chest pain  · CXR with moderate edema, b/l effusions  · Pt's spouse says he has not been following fluid restriction   · Started on BIPAP in the ED d/t resp distress; appears comfortable now, but could not wean from bipap  · Was in ICU   · Able to be weaned from bipap  · Resolved

## 2023-02-01 NOTE — QUICK NOTE
Informed by nursing that pt was complaining of chest pain earlier  Some changes in lead III on ekg  Spoke w/ cardiology on call, advised starting Plavix with loading dose as well as nitro  Pt reports improved chest pain following BM  Will continue Plavix and nitro PRN

## 2023-02-01 NOTE — ASSESSMENT & PLAN NOTE
· POA with troponin of 4K, peaked at 21185  · No current chest pain   EKG comparable to baseline  · Suspect nonMI elevation in the setting of flash pulm edema with known bad CAD  · 1/30 ECHO: LVEF 25-30%, moderate aortic stenosis  · Overnight 1/31-2/1 had recurrent chest pain, was loaded with clopidogrel  · Chest pain resolved after BM, no longer in pain morning 2/1    Plan:  · Cardiology consulted; appreciate recs  · Patient being transferred to [facility] for cardiac cath due to complicated heart history with numerous stents and calcified vessels  · Continue heparin gtt, clopidogrel, pasugrel

## 2023-02-01 NOTE — CASE MANAGEMENT
Case Management Assessment & Discharge Planning Note    Patient name Agatha Montez  Location [facility] 201/[facility] 341-18 MRN [id number]  : 1960 Date 2023       Current Admission Date: 2023  Current Admission Diagnosis:Elevated troponin with chest pain   Patient Active Problem List    Diagnosis Date Noted   • Hyponatremia 2023   • Pre-syncope 2023   • SOB (shortness of breath) 10/21/2022   • Left arm swelling 10/21/2022   • Coronary artery disease involving native coronary artery of native heart without angina pectoris 2022   • ESRD (end stage renal disease) ([facility] ) 06/10/2022   • Electrolyte abnormality 2022   • Chest pain 2022   • Generalized weakness 2022   • Elevated troponin with chest pain 2022   • Primary hypertension 2022   • Penetrating foot wound, left, initial encounter 2022   • Emotional lability 2022   • Enteritis 2022   • Leukocytosis 2022   • History of 2019 novel coronavirus disease (COVID-19) 2020   • ESRD on dialysis ([facility] ) 2020   • Anemia 10/05/2020   • S/P arteriovenous (AV) fistula creation 2020   • Pre-kidney transplant, listed 2020   • Urge incontinence 2019   • Overactive bladder 2019   • Anxiety associated with depression 2019   • symptomatic hypoglycemia 10/28/2018   • History of stroke 10/04/2018   • Abnormal EEG 2018   • Other constipation 2018   • GERD (gastroesophageal reflux disease) 2018   • Diabetic macular edema ([facility] ) 2018   • Behavior concern in adult 2018   • Elevated alkaline phosphatase level 2018   • Nephrotic range proteinuria 2018   • Type 2 diabetes mellitus with chronic kidney disease on chronic dialysis, with long-term current use of insulin ([facility] ) 2016   • Dizziness 2016   • Mixed hyperlipidemia 2016   • Diabetic polyneuropathy associated with type 2 diabetes mellitus ([facility] ) 2016 LOS (days): 0  Geometric Mean LOS (GMLOS) (days): 3 50  Days to GMLOS:3 3     OBJECTIVE:  PATIENT READMITTED TO HOSPITAL  Risk of Unplanned Readmission Score: 53 72         Current admission status: Inpatient       Preferred Pharmacy:   [facility]/pharmacy #[id number]- [place] - [address]  [address] [address]  Phone: [phone] Fax: [phone]    Primary Care Provider: Brittany Mckee DO    Primary Insurance: MEDICARE  Secondary Insurance: [place] [employer]    ASSESSMENT:  Marisa Denny Proxies    There are no active Health Care Proxies on file  Readmission Root Cause  30 Day Readmission: No    Patient Information  Admitted from[de-identified] Other (comment) (transfer from [facility])  Mental Status: Alert  During Assessment patient was accompanied by: Spouse, Son, Daughter  Assessment information provided by[de-identified] Patient, Spouse, Son, Daughter  Primary Caregiver: Self  Support Systems: Self, Spouse/significant other, Family members, Son, Daughter  [place] of Residence: [address] [place] [address] do you live in?: [place] entry access options   Select all that apply : Stairs  Number of steps to enter home : 2  Type of Current Residence: [address] [place] home  Upon entering residence, is there a bedroom on the main floor (no further steps)?: No  A bedroom is located on the following floor levels of residence (select all that apply):: 2nd Floor  Upon entering residence, is there a bathroom on the main floor (no further steps)?: No  Indicate which floors of current residence have a bathroom (select all the apply):: 2nd Floor  Number of steps to 2nd floor from main floor: One Flight  In the last 12 months, was there a time when you were not able to pay the mortgage or rent on time?: No  In the last 12 months, was there a time when you did not have a steady place to sleep or slept in a shelter (including now)?: No  Homeless/housing insecurity resource given?: N/A  Living Arrangements: Lives w/ Spouse/significant other  Is patient a ?: No    Activities of Daily Living Prior to Admission  Functional Status: Independent  Completes ADLs independently?: No  Level of ADL dependence: Assistance  Ambulates independently?: Yes  Does patient use assisted devices?: No  Does patient currently own DME?: No  Does patient have a history of Outpatient Therapy (PT/OT)?: No  Does the patient have a history of Short-Term Rehab?: No  Does patient have a history of HHC?: Yes ([facility])  Does patient currently have [facility]?: No         Patient Information Continued  Income Source: Pension/residential  Does patient have prescription coverage?: Yes  Within the past 12 months, you worried that your food would run out before you got the money to buy more : Never true  Within the past 12 months, the food you bought just didn't last and you didn't have money to get more : Never true  Food insecurity resource given?: N/A  Does patient receive dialysis treatments?: Yes ([address], TTS at 11 AM, family provides transport)  Does patient have a history of substance abuse?: No  Does patient have a history of Mental Health Diagnosis?: Yes (anxiety/depression)         Means of Transportation  Means of Transport to Appts[de-identified] Family transport  In the past 12 months, has lack of transportation kept you from medical appointments or from getting medications?: No  In the past 12 months, has lack of transportation kept you from meetings, work, or from getting things needed for daily living?: No        DISCHARGE DETAILS:    Discharge planning discussed with[de-identified] patient, family members at bedside  Freedom of Choice: Yes     CM contacted family/caregiver?: Yes  Were Treatment Team discharge recommendations reviewed with patient/caregiver?: Yes  Did patient/caregiver verbalize understanding of patient care needs?: Yes       Contacts  Patient Contacts: Pedro Matta, son  Relationship to Patient[de-identified] Family  Contact Method:  In Person, Phone  Phone Number: [phone]  Reason/Outcome: Continuity of Care, Emergency Contact, Discharge Planning                        Treatment Team Recommendation: Other (TBD)  Discharge Destination Plan[de-identified] Other (TBD -- awaiting recommendations)  Transport at Discharge : Family                   Patient/caregiver received discharge checklist   Content reviewed  Patient/caregiver encouraged to participate in discharge plan of care prior to discharge home  CM reviewed d/c planning process including the following: identifying help at home, patient preference for d/c planning needs, Discharge Lounge, [facility] Meds to Bed program, availability of treatment team to discuss questions or concerns patient and/or family may have regarding understanding medications and recognizing signs and symptoms once discharged  CM also encouraged patient to follow up with all recommended appointments after discharge  Patient advised of importance for patient and family to participate in managing patient’s medical well being  Additional Comments: Introduced self and role to patient and family at bedside  Patient ambulates independently, does receive some assistance with ADLs, such as showering and medication administration  Patient goes to dialysis at [facility] in a TTS schedule, family transports  Family asked about home health care, CM explained that if medically necessary we could set up home health/PT; also discussed private pay caregivers and the process for beginning the application for waiver services  CM will continue to follow for d/c needs

## 2023-02-01 NOTE — ASSESSMENT & PLAN NOTE
History of CAD with multiple interventions  • Takes home of prasugrel, coreg, and ASA  • Continue home regimen for now

## 2023-02-01 NOTE — ASSESSMENT & PLAN NOTE
Lab Results   Component Value Date    HGBA1C 5 5 12/23/2022       Recent Labs     01/31/23  1104 01/31/23  1628 01/31/23  2102 02/01/23  1157   POCGLU 134 192* 125 113       Blood Sugar Average: Last 72 hrs:  (P) 019 [id number]   Resume home insulin regimen when pt able to eat     Home: Lantus 14 U daily    Plan:  Lantus 14 units daily  Sliding scale insulin  Avoid hypoglycemia

## 2023-02-01 NOTE — ASSESSMENT & PLAN NOTE
Lab Results   Component Value Date    HGBA1C 5 5 12/23/2022       Recent Labs     01/31/23  0741 01/31/23  1104 01/31/23  1628 01/31/23  2102   POCGLU 108 134 192* 125       Blood Sugar Average: Last 72 hrs:   Known history of Type 2 Diabetes Mellitus  • Home regimen of:  Insulin   • Complicated by polyneuropathy, diabetic ulcer on ball L foot , no surrounding cellulitis , sero sanguineous drainage  Plan:  • Lantus 14 Units Daily, Lispro 4 Units TID with Meals + SSI  • Glucose Checks and Insulin Correction ACHS  • Goal -180 while admitted, currently normoglycemic   • Hypoglycemia monitoring and treat with protocol  Local wound care

## 2023-02-01 NOTE — ASSESSMENT & PLAN NOTE
Lab Results   Component Value Date    EGFR 4 02/01/2023    EGFR 7 01/31/2023    EGFR 5 01/30/2023    CREATININE 9 94 (H) 02/01/2023    CREATININE 7 29 (H) 01/31/2023    CREATININE 9 62 (H) 01/30/2023   • History of ESRD on HD MWF  • Access : left arm AV fistula   • Has been noncompliant with fluid restrictions at home per wife  • UF treatment performed on 1/29 with removal of 2 5L  • Dry weight 103 kg currently 98 kg, responded very well to dialysis yesterday, oxygen requirement improving , non acidotic  Plan:  • Nephro on board will follow recs, patient stable to transfer to [facility]  • Daily Weights  • Fluid restriction 1-1 5L/d  Renal Diet when appropriate

## 2023-02-01 NOTE — ASSESSMENT & PLAN NOTE
Lab Results   Component Value Date    SODIUM 132 (L) 02/01/2023    SODIUM 135 01/31/2023    SODIUM 136 01/30/2023     · Potentially secondary to pain, diuresis, dialysis, poor intake  · Monitor on BMP daily

## 2023-02-01 NOTE — ASSESSMENT & PLAN NOTE
· Patient presented for evaluation of acute shortness of breath and chest pressure  · Likely in the setting of volume overload in a patient with a background of ESRD  · No known h/o lung disease   · S/p Bipap , currently on O2 NC 4-5 L   · Will continue HD for volume status management MWF as per usual schedule  · Continue O2 nc , wean off as able

## 2023-02-01 NOTE — DISCHARGE SUMMARY
[facility]  Discharge- Irma Williamson 1960, 58 y o  male MRN: [id number]  Unit/Bed#: S -01 Encounter: [id number]  Primary Care Provider: Sam Perera DO   Date and time admitted to hospital: 1/29/2023  8:10 AM    * Elevated troponin with chest pain  Assessment & Plan  · POA with troponin of 4K, peaked at 87957  · No current chest pain   EKG comparable to baseline  · Suspect nonMI elevation in the setting of flash pulm edema with known bad CAD  · 1/30 ECHO: LVEF 25-30%, moderate aortic stenosis  · Overnight 1/31-2/1 had recurrent chest pain, was loaded with clopidogrel  · Chest pain resolved after BM, no longer in pain morning 2/1    Plan:  · Cardiology consulted; appreciate recs  · Patient being transferred to [facility] for cardiac cath due to complicated heart history with numerous stents and calcified vessels  · Continue heparin gtt, clopidogrel, pasugrel     Coronary artery disease involving native coronary artery of native heart without angina pectoris  Assessment & Plan  · Transfer to [facility]   · Cont coreg  · Cont effient   · Loaded with plavix due to recurrent chest pain    ESRD on dialysis [facility])  Assessment & Plan  Lab Results   Component Value Date    EGFR 4 02/01/2023    EGFR 7 01/31/2023    EGFR 5 01/30/2023    CREATININE 9 94 (H) 02/01/2023    CREATININE 7 29 (H) 01/31/2023    CREATININE 9 62 (H) 01/30/2023     Presented in respiratory distress due to severe volume overload causing pulmonary edema  UF 1/29    Plan:  Nephrology consulted; appreciate recs  HD per nephrology  Daily BMP  Torsemide 100mg daily    Diabetic polyneuropathy associated with type 2 diabetes mellitus [facility])  Assessment & Plan  Lab Results   Component Value Date    HGBA1C 5 5 12/23/2022       Recent Labs     01/31/23  1104 01/31/23  1628 01/31/23  2102 02/01/23  1157   POCGLU 134 192* 125 113       Blood Sugar Average: Last 72 hrs:  (P) 552 [id number]   Resume home insulin regimen when pt able to eat  Home: Lantus 14 U daily    Plan:  Lantus 14 units daily  Sliding scale insulin  Avoid hypoglycemia      Hyponatremia  Assessment & Plan  Lab Results   Component Value Date    SODIUM 132 (L) 02/01/2023    SODIUM 135 01/31/2023    SODIUM 136 01/30/2023     · Potentially secondary to pain, diuresis, dialysis, poor intake  · Monitor on BMP daily    Anemia  Assessment & Plan  Hgb at baseline of 9-10  Likely secondary to ESRD  Monitor CBC    History of stroke  Assessment & Plan   Last MRI brain in 4/22: "Chronic lacunar infarct in the left ventral medial thalamus  Small, chronic infarct in the right parietal lobe"     Baseline, emotionally labile        Acute respiratory failure with hypoxia (HCC)-resolved as of 2/1/2023  Assessment & Plan  · POA with sudden onset of SOB with chest pain  · CXR with moderate edema, b/l effusions  · Pt's spouse says he has not been following fluid restriction   · Started on BIPAP in the ED d/t resp distress; appears comfortable now, but could not wean from bipap  · Was in ICU   · Able to be weaned from bipap  · Resolved        Medical Problems     Resolved Problems  Date Reviewed: 2/1/2023          Resolved    Acute respiratory failure with hypoxia ([facility] [facility] ) 2/1/2023     Resolved by  Leslie Gonzales DO        Discharging Resident: Leslie Gonzales DO  Discharging Attending: No att  providers found  PCP: Nga Liriano DO  Admission Date:   Admission Orders (From admission, onward)     Ordered        01/29/23 1118  INPATIENT ADMISSION  Once                      Discharge Date: 02/01/23    Consultations During Hospital Stay:  · Cardiology, Nephrology    Procedures Performed:   · Ultrafiltration, Hemodialysis  · TT Echo: EF 25-30%, moderate aortic stenosis, moderately calcified/thick aortic valve, severe mitral valve calcification with moderate regurgitation    Significant Findings / Test Results:   · As above    Incidental Findings:   · None     Test Results Pending at Discharge (will require follow up): · None     Outpatient Tests Requested:  · None    Complications:  None    Reason for Admission: Acute respiratory failure with hypoxia, elevated troponins    Hospital Course:   Paula Connolly is a 58 y o  male patient who originally presented to the hospital on 1/29/2023 due to sudden onset SOB overnight  He also began having chest pressure/pain  He was started on BiPAP in the ED and unable to be weaned, so he was transferred to the ICU  Found to have pulmonary edema likely secondary to volume overload due to ESRD  He underwent ultrafiltration with the assistance of nephrology and thus was able to be weaned from respiratory support  Additionally, troponins were found to be quite elevated at 4037, peaking at 18553  He was started on heparin gtt and loaded with plavix  TTE findings as above  Due to his complex heart history, requiring numerous stents and having severely calcified vessels, he was transferred to [facility] for cardiac catheterization  Please see above list of diagnoses and related plan for additional information  Condition at Discharge: stable    Discharge Day Visit / Exam:   Subjective:  Patient feeling somewhat better  He is denying any chest pain, dizziness, light headedness or SOB today  Vitals: Blood Pressure: 126/70 (02/01/23 0700)  Pulse: 68 (02/01/23 0628)  Temperature: 98 2 °F (36 8 °C) (01/31/23 2131)  Temp Source: Oral (01/31/23 1931)  Respirations: 18 (02/01/23 0628)  Height: 5' 9" (175 3 cm) (01/30/23 0735)  Weight - Scale: 95 4 kg (210 lb 5 1 oz) (02/01/23 0628)  SpO2: 92 % (02/01/23 3459)  Exam:   Physical Exam  Constitutional:       General: He is not in acute distress  Appearance: He is obese  Cardiovascular:      Rate and Rhythm: Normal rate and regular rhythm  Heart sounds: No murmur heard  Pulmonary:      Effort: Pulmonary effort is normal  No respiratory distress  Breath sounds: Normal breath sounds   No wheezing or rales  Abdominal:      General: Abdomen is flat  There is no distension  Palpations: Abdomen is soft  Tenderness: There is no abdominal tenderness  Musculoskeletal:      Right lower leg: No edema  Left lower leg: No edema  Skin:     General: Skin is warm and dry  Neurological:      Mental Status: He is alert and oriented to person, place, and time  Mental status is at baseline  Psychiatric:         Mood and Affect: Mood normal          Thought Content: Thought content normal           Discussion with Family: Updated  (wife and son) at bedside  Discharge instructions/Information to patient and family:   See after visit summary for information provided to patient and family  Provisions for Follow-Up Care:  See after visit summary for information related to follow-up care and any pertinent home health orders  Disposition:   [address] Transfer to \[facility]\""    Planned Readmission: Yes    Discharge Medications:  See after visit summary for reconciled discharge medications provided to patient and/or family        **Please Note: This note may have been constructed using a voice recognition system**

## 2023-02-01 NOTE — PROGRESS NOTES
Progress note- Nephrology   Agatha Montez 58 y o  male MRN: [id number]  Unit/Bed#: [facility] 201-01 Encounter: [id number]    80-year-old male with history of RSD on dialysis, CAD status post prior intervention, CVA, diabetes initially presented to [facility] with acute onset of shortness of breath  Found to have pulmonary edema treated with dialysis and extra UF  Patient with history of CAD now transferred to [facility] for cardiac evaluation with cardiac catheterization  [address] consulted for ongoing management of ER SD       ASSESSMENT/PLAN:  End-stage renal disease:   Assessment:  • On maintenance hemodialysis every MWF at [facility]  • EDW: Previous target weight 103 2 kg-however has been below with HD and extra UF  • Status post UF 1/31/2023 for - 1 8 L  • Will adjust target weight next hemodialysis  Plan:  • We will hold dialysis today for patient is examining euvolemic to dry and potassium stable at 4 7  • Patient for cardiac catheterization today  • No urgent need for dialysis at this time  • We will plan 2-hour HD treatment tomorrow and full treatment Friday as normally scheduled  • Avoid nephrotoxins, adjust meds to appropriate GFR  • Will continue to follow I/O, labs and volume status  • Need standing weight for accuracy    Access:   Assessment and Plan:  • Left upper extremity AV fistula-to do bruit and thrill    Hypertension:  Assessment and Plan:  • Current /62  • Avoid variations in blood pressure  • Current medications include:  Torsemide 100 mg daily, atenolol 12 5 mg 2 times daily  • Will UF as blood pressure allows    Anemia likely Chronic Kidney Disease:  Assessment and Plan:  • Current hemoglobin 10 7-goal  • Receives IV Venofer for every 2 weeks as outpatient  • Transfuse for hemoglobin less than 7 0    Bone mineral disease of Chronic Kidney Disease:  Assessment and Plan:  • Hyperphosphatemia:  On calcium acetate 1 tablet 3 times a day with meals  • Secondary hyperparathyroidism: On Sensipar 50 mg 3 times a week with HD  • Recommend renal diet when eating  • Check PTH and phosphorus as outpatient    Electrolytes/Acid Base:  Assessment:Plan:  Hyponatremia-likely volume mediated in the setting of ERSD and volume removal  • Sodium, phosphorus, potassium, and acidosis to be corrected with dialysis  • Will continue to monitor     Acute hypoxic respiratory failure-improved volume removal  Assessment and plan  · In the setting of pulmonary edema  · Status post hemodialysis and extra UF treatment  · Respiratory status has improved now on room air    CAD  Assessment and plan  · History of multiple cardiac interventions  · Recent echocardiogram this admission revealed EF of 30%  · Recently transferred to [facility] for cardiac catheterization-plan today  · Cardiology following    History of CVA-with associated behavioral changes        Review of systems:  Patient seen and examined at bedside  Patient reports low blood pressure 6 times post UF treatment yesterday  Feeling little bit better now  Review of Systems   Constitutional: Negative  Negative for activity change, appetite change, chills, diaphoresis, fatigue and fever  HENT: Negative  Negative for congestion and facial swelling  Respiratory: Negative  Cardiovascular: Negative  Gastrointestinal: Negative  Endocrine: Negative  Genitourinary: Negative  Musculoskeletal: Negative  Skin: Negative  Allergic/Immunologic: Negative  Neurological: Negative  Hematological: Negative  Psychiatric/Behavioral: Negative  Physical Exam:  Current Weight:    Vitals:    02/01/23 1000   BP: 107/62   BP Location: Right arm   Pulse: 70   Resp: 18   Temp: 98 1 °F (36 7 °C)   TempSrc: Oral   SpO2: 96%     No intake or output data in the 24 hours ending 02/01/23 1144    Physical Exam  Vitals and nursing note reviewed  Constitutional:       Appearance: Normal appearance     HENT:      Head: Normocephalic and atraumatic  Nose: Nose normal       Mouth/Throat:      Mouth: Mucous membranes are dry  Pharynx: Oropharynx is clear  Eyes:      Extraocular Movements: Extraocular movements intact  Conjunctiva/sclera: Conjunctivae normal    Cardiovascular:      Rate and Rhythm: Normal rate and regular rhythm  Pulses: Normal pulses  Heart sounds: Normal heart sounds  Pulmonary:      Breath sounds: Normal breath sounds  Abdominal:      General: Bowel sounds are normal       Palpations: Abdomen is soft  Musculoskeletal:         General: Normal range of motion  Cervical back: Normal range of motion and neck supple  Skin:     General: Skin is dry  Neurological:      General: No focal deficit present  Mental Status: He is alert and oriented to person, place, and time  Psychiatric:         Mood and Affect: Mood normal          Behavior: Behavior normal          Thought Content:  Thought content normal          Judgment: Judgment normal           Medications:    Current Facility-Administered Medications:   •  acetaminophen (TYLENOL) tablet 975 mg, 975 mg, Oral, Q8H PRN, Alvino Youssef MD  •  [START ON 2/2/2023] aspirin (ECOTRIN LOW STRENGTH) EC tablet 81 mg, 81 mg, Oral, Daily, Alvino Youssef MD  •  atorvastatin (LIPITOR) tablet 40 mg, 40 mg, Oral, Daily With Dinner, Alvino Youssef MD  •  calcium acetate (PHOSLO) capsule 667 mg, 667 mg, Oral, TID With Meals, Alvino Youssef MD  •  carvedilol (COREG) tablet 12 5 mg, 12 5 mg, Oral, BID With Meals, Alvino Youssef MD  •  chlorhexidine (PERIDEX) 0 12 % oral rinse 15 mL, 15 mL, Mouth/Throat, Q12H [facility], Alvino Youssef MD  •  guaiFENesin (ROBITUSSIN) oral liquid 200 mg, 200 mg, Oral, Q4H PRN, Alvino Youssef MD  •  heparin (porcine) 25,000 units in 0 45% NaCl 250 mL infusion (premix), 3-20 Units/kg/hr (Order-Specific), Intravenous, Titrated, Alvino Youssef MD, Last Rate: 16 3 mL/hr at 02/01/23 1058, 18 1 Units/kg/hr at 02/01/23 1058  •  heparin (porcine) injection 2,000 Units, 2,000 Units, Intravenous, Q6H PRN, Rob Miller MD  •  heparin (porcine) injection 4,000 Units, 4,000 Units, Intravenous, Q6H PRN, Rob Miller MD  •  insulin lispro (HumaLOG) 100 units/mL subcutaneous injection 1-5 Units, 1-5 Units, Subcutaneous, 4x Daily (AC & HS) **AND** Fingerstick Glucose (POCT), , , 4x Daily AC and at bedtime, Rob Miller MD  •  ipratropium (ATROVENT) 0 02 % inhalation solution 0 5 mg, 0 5 mg, Nebulization, TID PRN, Rob Miller MD  •  levalbuterol ([place]) inhalation solution 1 25 mg, 1 25 mg, Nebulization, TID PRN, Rob Miller MD  •  melatonin tablet 3 mg, 3 mg, Oral, HS, Rob Miller MD  •  nitroglycerin (NITROSTAT) SL tablet 0 4 mg, 0 4 mg, Sublingual, Q5 Min PRN, Rob Miller MD  •  [START ON 2/2/2023] prasugrel (EFFIENT) tablet 5 mg, 5 mg, Oral, Daily, Rob Miller MD  •  [START ON 2/2/2023] torsemide (DEMADEX) tablet 100 mg, 100 mg, Oral, Daily, Rob Miller MD  •  valproate (DEPACON) 250 mg in sodium chloride 0 9 % 50 mL IVPB, 250 mg, Intravenous, Q12H [facility], Rob Miller MD    Laboratory Results:  Results from last 7 days   Lab Units 02/01/23  0729 01/31/23  0521 01/30/23  1505 01/30/23  0458 01/29/23  1415 01/29/23  0821 01/26/23  0453 01/25/23  1958   WBC Thousand/uL 8 27 8 33  --  11 00*  --  11 83* 5 48 5 62   HEMOGLOBIN g/dL 10 7* 9 7*  --  9 6*  --  10 9* 10 1* 10 1*   I STAT HEMOGLOBIN g/dl  --   --  9 9*  --   --   --   --   --    HEMATOCRIT % 33 7* 29 9*  --  30 2*  --  35 6* 31 4* 31 5*   HEMATOCRIT, ISTAT %  --   --  29*  --   --   --   --   --    PLATELETS Thousands/uL 180 160  --  148* 172 181 140* 129*   SODIUM mmol/L 132* 135  --  136  --  141 138 136   POTASSIUM mmol/L 4 4 4 3  --  5 2  --  3 5 4 4 3 6   CHLORIDE mmol/L 89* 93*  --  98  --  114* 97 93*   CO2 mmol/L 25 28  --  25  --  20* 32 33*   CO2, I-STAT mmol/L  --   --  25  --   --   --   --   --    BUN mg/dL 89* 52*  --  68*  --  36* 29* 23   CREATININE mg/dL 9 94* 7 29*  --  9 62*  --  5 40* 5 61* 4 50*   CALCIUM mg/dL 8 8 8 9  --  8 8  --  6 0* 9 1 9 4   MAGNESIUM mg/dL 3 2* 3 0*  --   --   --  1 5*  --   --    PHOSPHORUS mg/dL  --  5 9*  --   --   --  3 0  --   --    GLUCOSE, ISTAT mg/dl  --   --  142*  --   --   --   --   --

## 2023-02-01 NOTE — PLAN OF CARE
Problem: PAIN - ADULT  Goal: Verbalizes/displays adequate comfort level or baseline comfort level  Description: Interventions:  - Encourage patient to monitor pain and request assistance  - Assess pain using appropriate pain scale  - Administer analgesics based on type and severity of pain and evaluate response  - Implement non-pharmacological measures as appropriate and evaluate response  - Consider cultural and social influences on pain and pain management  - Notify physician/advanced practitioner if interventions unsuccessful or patient reports new pain  Outcome: Progressing     Problem: INFECTION - ADULT  Goal: Absence or prevention of progression during hospitalization  Description: INTERVENTIONS:  - Assess and monitor for signs and symptoms of infection  - Monitor lab/diagnostic results  - Monitor all insertion sites, i e  indwelling lines, tubes, and drains  - Monitor endotracheal if appropriate and nasal secretions for changes in amount and color  - [place] appropriate cooling/warming therapies per order  - Administer medications as ordered  - Instruct and encourage patient and family to use good hand hygiene technique  - Identify and instruct in appropriate isolation precautions for identified infection/condition  Outcome: Progressing  Goal: Absence of fever/infection during neutropenic period  Description: INTERVENTIONS:  - Monitor WBC    Outcome: Progressing     Problem: SAFETY ADULT  Goal: Patient will remain free of falls  Description: INTERVENTIONS:  - Educate patient/family on patient safety including physical limitations  - Instruct patient to call for assistance with activity   - Consult OT/PT to assist with strengthening/mobility   - Keep Call bell within reach  - Keep bed low and locked with side rails adjusted as appropriate  - Keep care items and personal belongings within reach  - Initiate and maintain comfort rounds  - Make Fall Risk Sign visible to staff  - Offer Toileting every 2 Hours, in advance of need  - Initiate/Maintain alarm  - Obtain necessary fall risk management equipment  - Apply yellow socks and bracelet for high fall risk patients  - Consider moving patient to room near nurses station  Outcome: Progressing  Goal: Maintain or return to baseline ADL function  Description: INTERVENTIONS:  -  Assess patient's ability to carry out ADLs; assess patient's baseline for ADL function and identify physical deficits which impact ability to perform ADLs (bathing, care of mouth/teeth, toileting, grooming, dressing, etc )  - Assess/evaluate cause of self-care deficits   - Assess range of motion  - Assess patient's mobility; develop plan if impaired  - Assess patient's need for assistive devices and provide as appropriate  - Encourage maximum independence but intervene and supervise when necessary  - Involve family in performance of ADLs  - Assess for home care needs following discharge   - Consider OT consult to assist with ADL evaluation and planning for discharge  - Provide patient education as appropriate  Outcome: Progressing  Goal: Maintains/Returns to pre admission functional level  Description: INTERVENTIONS:  - Perform BMAT or MOVE assessment daily    - Set and communicate daily mobility goal to care team and patient/family/caregiver  - Collaborate with rehabilitation services on mobility goals if consulted  - Perform Range of Motion 3 times a day  - Reposition patient every 2 hours    - Dangle patient 3 times a day  - Stand patient 3 times a day  - Ambulate patient 3 times a day  - Out of bed to chair 3 times a day   - Out of bed for meals 3 times a day  - Out of bed for toileting  - Record patient progress and toleration of activity level   Outcome: Progressing     Problem: DISCHARGE PLANNING  Goal: Discharge to home or other facility with appropriate resources  Description: INTERVENTIONS:  - Identify barriers to discharge w/patient and caregiver  - Arrange for needed discharge resources and transportation as appropriate  - Identify discharge learning needs (meds, wound care, etc )  - Arrange for interpretive services to assist at discharge as needed  - Refer to Case Management Department for coordinating discharge planning if the patient needs post-hospital services based on physician/advanced practitioner order or complex needs related to functional status, cognitive ability, or social support system  Outcome: Progressing     Problem: Knowledge Deficit  Goal: Patient/family/caregiver demonstrates understanding of disease process, treatment plan, medications, and discharge instructions  Description: Complete learning assessment and assess knowledge base    Interventions:  - Provide teaching at level of understanding  - Provide teaching via preferred learning methods  Outcome: Progressing

## 2023-02-01 NOTE — H&P
[address] [facility] 1960, 58 y o  male MRN: [id number]  [facility]: [id number]  Primary Care Provider: Chuy Yeager DO   Date and time admitted to hospital: 2/1/2023  9:27 AM    * Elevated troponin with chest pain  Assessment & Plan  Elevated troponins present on arrival at [facility]: [phone] peaking at 55,910, in a patient with a background of diffuse heart disease including multiple PCIs non compliant with DAPT, LAD thrombus s/p thrombectomy , balloon angioplasty LAD , moderate AS   • EKG: NSR 76 with RBBB , dynamic changes anterolateral leads   • Possibly  nonischemic MI in the setting flash pulmonary edema however taking in consideration patient's extensive cardiac h/o consider  Further eval ( cardiac cath)  • Echocardiogram with decreased EF now 25-30%  • Continue Heparin Drip  • Cardiology consulted for cardiac catheterization    Hyponatremia  Assessment & Plan  · Due to ESRD  · Management per HD    Acute respiratory failure with hypoxia ([address] )  Assessment & Plan  · Patient presented for evaluation of acute shortness of breath and chest pressure  · Likely in the setting of volume overload in a patient with a background of ESRD  · No known h/o lung disease   · S/p Bipap , currently on O2 NC 4-5 L   · Will continue HD for volume status management MWF as per usual schedule  · Continue O2 nc , wean off as able     Coronary artery disease involving native coronary artery of native heart without angina pectoris  Assessment & Plan  History of CAD with multiple interventions  • Takes home of prasugrel, coreg, and ASA  • Continue home regimen for now    ESRD on dialysis [facility])  Assessment & Plan  Lab Results   Component Value Date    EGFR 4 02/01/2023    EGFR 7 01/31/2023    EGFR 5 01/30/2023    CREATININE 9 94 (H) 02/01/2023    CREATININE 7 29 (H) 01/31/2023    CREATININE 9 62 (H) 01/30/2023   • History of ESRD on HD MWF  • Access : left arm AV fistula   • Has been noncompliant with fluid restrictions at home per wife  • UF treatment performed on 1/29 with removal of 2 5L  • Dry weight 103 kg currently 98 kg, responded very well to dialysis yesterday, oxygen requirement improving , non acidotic  Plan:  • Nephro on board will follow recs, patient stable to transfer to [facility]  • Daily Weights  • Fluid restriction 1-1 5L/d  Renal Diet when appropriate     Anemia  Assessment & Plan  · Likely due to CKD  · No indication to transfuse at this time  · EPO therapy per nephrology    Diabetic polyneuropathy associated with type 2 diabetes mellitus [facility])  Assessment & Plan  Lab Results   Component Value Date    HGBA1C 5 5 12/23/2022       Recent Labs     01/31/23  0741 01/31/23  1104 01/31/23  1628 01/31/23  2102   POCGLU 108 134 192* 125       Blood Sugar Average: Last 72 hrs:   Known history of Type 2 Diabetes Mellitus  • Home regimen of: Insulin   • Complicated by polyneuropathy, diabetic ulcer on ball L foot , no surrounding cellulitis , sero sanguineous drainage  Plan:  • Lantus 14 Units Daily, Lispro 4 Units TID with Meals + SSI  • Glucose Checks and Insulin Correction ACHS  • Goal -180 while admitted, currently normoglycemic   • Hypoglycemia monitoring and treat with protocol  Local wound care      VTE Pharmacologic Prophylaxis:   Moderate Risk (Score 3-4) - Pharmacological DVT Prophylaxis Ordered: heparin  Code Status: Level 1 - Full Code   Discussion with family: Updated  (multiple) at bedside  Anticipated Length of Stay: Patient will be admitted on an inpatient basis with an anticipated length of stay of greater than 2 midnights secondary to CAD  Total Time for Visit, including Counseling / Coordination of Care: 45 minutes Greater than 50% of this total time spent on direct patient counseling and coordination of care      Chief Complaint: chest pain    History of Present Illness:  Lowella Curling is a 58 y o  male with a PMH of CAD who presents with chest pain and shortness of breath  He initially presented to [facility] and was found to have elevated troponins  Give his history of multiple CAD, instent thrombosis and calcified coronary arteries he was referred to [facility] for further management  Currently he denies any chest pain or shortness of breath  Review of Systems:  Review of Systems   All other systems reviewed and are negative  Past Medical and Surgical History:   Past Medical History:   Diagnosis Date   • Cerebrovascular accident (CVA) due to thrombosis of left middle cerebral artery ([facility] ) 7/29/2018   • Chronic kidney disease    • Diabetes mellitus ([facility] )    • GERD (gastroesophageal reflux disease)    • Hypercholesteremia    • Hyperlipidemia    • Hypertension    • 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   • Neuropathy    • Obesity    • [facility] )     last assessed 11/4/16   • PVC's (premature ventricular contractions)     sees cardiology Dr Nelida camargo   • Stroke [facility])     last weeof July 2018 [address] [facility]   • TIA (transient ischemic attack) 10/28/2018       Past Surgical History:   Procedure Laterality Date   • ABDOMINAL SURGERY     • CARDIAC CATHETERIZATION N/A 5/2/2022    Procedure: Cardiac Coronary Angiogram;  Surgeon: Maria Luz Espinoza MD;  Location: [facility] CARDIAC CATH LAB; Service: Cardiology   • CARDIAC CATHETERIZATION N/A 5/2/2022    Procedure: Cardiac pci;  Surgeon: Maria Luz Espinoza MD;  Location: [facility] CARDIAC CATH LAB; Service: Cardiology   • CHOLECYSTECTOMY      Percutaneous   • COLONOSCOPY     • CYSTOSCOPY     • OTHER SURGICAL HISTORY      "stimulator to control bowel movements"   • NV ESOPHAGOGASTRODUODENOSCOPY TRANSORAL DIAGNOSTIC N/A 9/27/2016    Procedure: ESOPHAGOGASTRODUODENOSCOPY (EGD); Surgeon: Jj Mackey MD;  Location: [facility] GI LAB;   Service: Gastroenterology   • NV LAPAROSCOPY SURG CHOLECYSTECTOMY N/A 2/29/2016    Procedure: LAPAROSCOPIC CHOLECYSTECTOMY ; Surgeon: Haily Fontaine DO;  Location: [facility] Main OR;  Service: General   • ROTATOR CUFF REPAIR Right    • TOE AMPUTATION Right 10/28/2016    Procedure: 3RD TOE AMPUTATION ;  Surgeon: Darylene Bleak, DPM;  Location: [facility] Main OR;  Service:        Meds/Allergies:  Prior to Admission medications    Medication Sig Start Date End Date Taking? Authorizing Provider   aspirin (ECOTRIN LOW STRENGTH) 81 mg EC tablet Take 81 mg by mouth daily Resume on 8/14      Historical Provider, MD   atorvastatin (LIPITOR) 40 mg tablet TAKE 1 TABLET BY MOUTH EVERY DAY WITH DINNER 12/16/22   Raj Yousif DO   Blood Glucose Monitoring Suppl (True Metrix Meter) w/Device KIT Use to test blood sugars 3 times daily 7/1/22   Mel Foster MD   Blood Pressure Monitoring (BLOOD PRESSURE CUFF) MISC Use to check blood pressure before taking blood pressure medication and 1 hour after and follow instructions provided in discharge instructions based on the readings   8/13/18   Mayur Raymond MD   calcium acetate (CALPHRON) 667 mg 3 tablets 3x per day 10/17/21   Historical Provider, MD   carvedilol (COREG) 12 5 mg tablet Take 1 tablet (12 5 mg total) by mouth 2 (two) times a day with meals 10/13/22 1/5/23  Kiya Ricks MD   Cholecalciferol (Vitamin D3) 1 25 MG (72305 UT) CAPS TAKE 1 CAPSULE BY MOUTH ONE TIME PER WEEK 6/3/22   Danielle Freed MD   divalproex sodium (DEPAKOTE SPRINKLE) 125 MG capsule Take 2 capsules (250 mg total) by mouth every 12 (twelve) hours 1/25/23   Raj Augsute DO   glucose blood (True Metrix Blood Glucose Test) test strip Use 1 each 3 (three) times a day Use as instructed 7/1/22   Mel Foster MD   insulin glargine (Lantus) 100 units/mL subcutaneous injection Inject 14 Units under the skin daily 12/14/22   Mel Foster MD   insulin lispro (HumaLOG KwikPen) 100 units/mL injection pen INJECT 4 UNITS 3 TIMES A DAY WITH MEALS PLUS SCALE (max daily dose 42 units) 12/14/22   Mel Foster MD   Insulin Pen Needle (BD Pen Needle Adina U/F) 32G X 4 MM MISC Use 4 times daily 12/14/22   Hilario Morris MD   Insulin Syringe-Needle U-100 (B-D INS SYRINGE 0 5CC/30GX1/2") 30G X 1/2" 0 5 ML MISC Inject once daily 12/14/22   Hilario Morris MD   Lancets Ultra Thin 30G MISC Use 3 times a day 3/28/22   Lissette Brennan PA-C   Methoxy PEG-Epoetin Beta 30 MCG/0 3ML SOSY 30 mcg  Patient not taking: Reported on 1/3/2023 5/16/22 5/15/23  Historical Provider, MD [employer] LANCETS 57Q [address] [facility] by Does not apply route 3 (three) times a day 11/27/18   Raj Auguste DO   prasugrel (EFFIENT) tablet Take 1 tablet by mouth daily 8/2/22   Historical Provider, MD   torsemide (DEMADEX) 100 mg tablet Take 0 5 tablets (50 mg total) by mouth daily As directed by your nephrologist 1/13/23   Rivka Hatfield DO     I have reviewed home medications using recent Epic encounter      Allergies: No Known Allergies    Social History:  Marital Status: /Civil Union   Occupation: retired  Patient Pre-hospital Living Situation: Home  Patient Pre-hospital Level of Mobility: walks  Patient Pre-hospital Diet Restrictions: renal, diabetic  Substance Use History:   Social History     Substance and Sexual Activity   Alcohol Use Not Currently     Social History     Tobacco Use   Smoking Status Never   Smokeless Tobacco Never     Social History     Substance and Sexual Activity   Drug Use No       Family History:  Family History   Problem Relation Age of Onset   • Leukemia Mother    • Liver disease Mother    • Lung cancer Mother         heavy smoker - 3 ppd   • Heart disease Father    • Liver disease Father    • Multiple myeloma Sister    • Breast cancer Sister    • Urolithiasis Family    • Alcohol abuse Neg Hx    • Depression Neg Hx    • Drug abuse Neg Hx    • Substance Abuse Neg Hx    • Mental illness Neg Hx        Physical Exam:     Vitals:   Blood Pressure: 107/62 (02/01/23 1000)  Pulse: 70 (02/01/23 1000)  Temperature: 98 1 °F (36 7 °C) (02/01/23 1000)  Temp Source: Oral (02/01/23 1000)  Respirations: 18 (02/01/23 1000)  SpO2: 96 % (02/01/23 1000)    Physical Exam  Constitutional:       Appearance: Normal appearance  He is obese  HENT:      Head: Normocephalic and atraumatic  Nose: Nose normal       Mouth/Throat:      Mouth: Mucous membranes are dry  Eyes:      Extraocular Movements: Extraocular movements intact  Cardiovascular:      Rate and Rhythm: Normal rate and regular rhythm  Pulmonary:      Effort: Pulmonary effort is normal       Breath sounds: No wheezing or rales  Abdominal:      General: There is no distension  Musculoskeletal:      Right lower leg: No edema  Left lower leg: No edema  Skin:     General: Skin is warm and dry  Neurological:      Mental Status: He is alert and oriented to person, place, and time  Mental status is at baseline     Psychiatric:         Mood and Affect: Mood normal          Behavior: Behavior normal           Additional Data:     Lab Results:  Results from last 7 days   Lab Units 02/01/23  0729   WBC Thousand/uL 8 27   HEMOGLOBIN g/dL 10 7*   HEMATOCRIT % 33 7*   PLATELETS Thousands/uL 180   NEUTROS PCT % 71   LYMPHS PCT % 16   MONOS PCT % 11   EOS PCT % 1     Results from last 7 days   Lab Units 02/01/23  0729 01/31/23  0521   SODIUM mmol/L 132* 135   POTASSIUM mmol/L 4 4 4 3   CHLORIDE mmol/L 89* 93*   CO2 mmol/L 25 28   BUN mg/dL 89* 52*   CREATININE mg/dL 9 94* 7 29*   ANION GAP mmol/L 18* 14*   CALCIUM mg/dL 8 8 8 9   ALBUMIN g/dL  --  4 0   TOTAL BILIRUBIN mg/dL  --  1 04*   ALK PHOS U/L  --  56   ALT U/L  --  13   AST U/L  --  21   GLUCOSE RANDOM mg/dL 108 118     Results from last 7 days   Lab Units 01/29/23  0821   INR  1 03     Results from last 7 days   Lab Units 01/31/23  2102 01/31/23  1628 01/31/23  1104 01/31/23  0741 01/30/23  2055 01/30/23  1654 01/30/23  1100 01/30/23  0729 01/29/23  2119 01/29/23  1806 01/29/23  1407 01/29/23  0842   POC GLUCOSE mg/dl 125 192* 134 108 137 113 140 134 168* 152* 122 139         Results from last 7 days   Lab Units 01/29/23  2114 01/29/23  0844 01/29/23  0821   LACTIC ACID mmol/L 1 2  --  1 1   PROCALCITONIN ng/ml  --  0 31*  --        Lines/Drains:  Invasive Devices     Peripheral Intravenous Line  Duration           Peripheral IV 01/29/23 Right Antecubital 3 days    Peripheral IV 01/29/23 Right Wrist 3 days          Line  Duration           Hemodialysis AV Fistula Left Upper arm -- days                    Imaging: Reviewed radiology reports from this admission including: ECHO  No orders to display       EKG and Other Studies Reviewed on Admission:   · EKG: No EKG obtained  ** Please Note: This note has been constructed using a voice recognition system   **

## 2023-02-01 NOTE — NURSING NOTE
Arrived to shift with pt with c/o chest and generalized pain 9/10  Pt BP running soft BP on arrival 96/51  MD at patients bedside  Pt with several family members at bedside  500ml fluid bolus ordered but later not given  Albumin still running at that time , MD aware and then family/Pt refused fluid bolus due to improvement of BP  RN explained the action and purpose of Albumin  Also, explained fluid restrictions with cardiac and renal patients and the potential for overload  Pt and family verbalized understanding  Pt s/p HD today  EKG obtained  Labs obtained and pending  /64  HR 69  Pt settled in bed  Will continue to monitor

## 2023-02-01 NOTE — CONSULTS
Please see full consult completed at [address] on 1/30/23 by Dr Sneha Zuniga and Dr Leigh Ponce

## 2023-02-01 NOTE — ASSESSMENT & PLAN NOTE
Elevated troponins present on arrival at [facility]: [phone] peaking at 81,546, in a patient with a background of diffuse heart disease including multiple PCIs non compliant with DAPT, LAD thrombus s/p thrombectomy , balloon angioplasty LAD , moderate AS   • EKG: NSR 76 with RBBB , dynamic changes anterolateral leads   • Possibly  nonischemic MI in the setting flash pulmonary edema however taking in consideration patient's extensive cardiac h/o consider  Further eval ( cardiac cath)  • Echocardiogram with decreased EF now 25-30%  • Continue Heparin Drip  • Cardiology consulted for cardiac catheterization

## 2023-02-02 ENCOUNTER — APPOINTMENT (INPATIENT)
Dept: DIALYSIS | Facility: HOSPITAL | Age: 63
End: 2023-02-02

## 2023-02-02 PROBLEM — I25.5 ISCHEMIC CARDIOMYOPATHY: Status: ACTIVE | Noted: 2023-02-02

## 2023-02-02 PROBLEM — F39 MOOD DISORDER (HCC): Chronic | Status: ACTIVE | Noted: 2023-02-02

## 2023-02-02 PROBLEM — I35.0 AS (AORTIC STENOSIS): Status: ACTIVE | Noted: 2023-02-02

## 2023-02-02 PROBLEM — I21.4 TYPE 1 NON-ST ELEVATION MYOCARDIAL INFARCTION (NSTEMI) (HCC): Status: ACTIVE | Noted: 2023-02-02

## 2023-02-02 LAB
ANION GAP SERPL CALCULATED.3IONS-SCNC: 16 MMOL/L (ref 4–13)
BUN SERPL-MCNC: 112 MG/DL (ref 5–25)
CALCIUM SERPL-MCNC: 8.4 MG/DL (ref 8.3–10.1)
CHLORIDE SERPL-SCNC: 93 MMOL/L (ref 96–108)
CO2 SERPL-SCNC: 22 MMOL/L (ref 21–32)
CREAT SERPL-MCNC: 11.9 MG/DL (ref 0.6–1.3)
ERYTHROCYTE [DISTWIDTH] IN BLOOD BY AUTOMATED COUNT: 15.1 % (ref 11.6–15.1)
GFR SERPL CREATININE-BSD FRML MDRD: 4 ML/MIN/1.73SQ M
GLUCOSE SERPL-MCNC: 102 MG/DL (ref 65–140)
GLUCOSE SERPL-MCNC: 115 MG/DL (ref 65–140)
GLUCOSE SERPL-MCNC: 116 MG/DL (ref 65–140)
GLUCOSE SERPL-MCNC: 118 MG/DL (ref 65–140)
GLUCOSE SERPL-MCNC: 85 MG/DL (ref 65–140)
GLUCOSE SERPL-MCNC: 89 MG/DL (ref 65–140)
HCT VFR BLD AUTO: 31.5 % (ref 36.5–49.3)
HGB BLD-MCNC: 10.1 G/DL (ref 12–17)
MAGNESIUM SERPL-MCNC: 3.6 MG/DL (ref 1.6–2.6)
MCH RBC QN AUTO: 30.1 PG (ref 26.8–34.3)
MCHC RBC AUTO-ENTMCNC: 32.1 G/DL (ref 31.4–37.4)
MCV RBC AUTO: 94 FL (ref 82–98)
PHOSPHATE SERPL-MCNC: 6.7 MG/DL (ref 2.3–4.1)
PLATELET # BLD AUTO: 168 THOUSANDS/UL (ref 149–390)
PMV BLD AUTO: 10.1 FL (ref 8.9–12.7)
POTASSIUM SERPL-SCNC: 4.6 MMOL/L (ref 3.5–5.3)
RBC # BLD AUTO: 3.36 MILLION/UL (ref 3.88–5.62)
SODIUM SERPL-SCNC: 131 MMOL/L (ref 135–147)
WBC # BLD AUTO: 6.22 THOUSAND/UL (ref 4.31–10.16)

## 2023-02-02 PROCEDURE — 5A1D70Z PERFORMANCE OF URINARY FILTRATION, INTERMITTENT, LESS THAN 6 HOURS PER DAY: ICD-10-PCS | Performed by: INTERNAL MEDICINE

## 2023-02-02 RX ORDER — ALBUMIN (HUMAN) 12.5 G/50ML
25 SOLUTION INTRAVENOUS ONCE
Status: COMPLETED | OUTPATIENT
Start: 2023-02-02 | End: 2023-02-02

## 2023-02-02 RX ORDER — SACUBITRIL AND VALSARTAN 24; 26 MG/1; MG/1
1 TABLET, FILM COATED ORAL 2 TIMES DAILY
Qty: 60 TABLET | Refills: 0 | Status: SHIPPED | OUTPATIENT
Start: 2023-02-02 | End: 2023-02-04

## 2023-02-02 RX ORDER — DIVALPROEX SODIUM 125 MG/1
250 CAPSULE, COATED PELLETS ORAL EVERY 12 HOURS SCHEDULED
Status: DISCONTINUED | OUTPATIENT
Start: 2023-02-02 | End: 2023-02-04 | Stop reason: HOSPADM

## 2023-02-02 RX ADMIN — CHLORHEXIDINE GLUCONATE 0.12% ORAL RINSE 15 ML: 1.2 LIQUID ORAL at 08:04

## 2023-02-02 RX ADMIN — ATORVASTATIN CALCIUM 40 MG: 40 TABLET, FILM COATED ORAL at 17:31

## 2023-02-02 RX ADMIN — HEPARIN SODIUM 5000 UNITS: 5000 INJECTION INTRAVENOUS; SUBCUTANEOUS at 06:17

## 2023-02-02 RX ADMIN — VALPROATE SODIUM 250 MG: 100 INJECTION, SOLUTION INTRAVENOUS at 10:50

## 2023-02-02 RX ADMIN — CALCIUM ACETATE 667 MG: 667 CAPSULE ORAL at 13:10

## 2023-02-02 RX ADMIN — ACETAMINOPHEN 975 MG: 325 TABLET ORAL at 21:46

## 2023-02-02 RX ADMIN — CALCIUM ACETATE 667 MG: 667 CAPSULE ORAL at 17:31

## 2023-02-02 RX ADMIN — DIVALPROEX SODIUM 250 MG: 125 CAPSULE, COATED PELLETS ORAL at 20:22

## 2023-02-02 RX ADMIN — PRASUGREL 5 MG: 10 TABLET, FILM COATED ORAL at 08:05

## 2023-02-02 RX ADMIN — CALCIUM ACETATE 667 MG: 667 CAPSULE ORAL at 08:05

## 2023-02-02 RX ADMIN — ALBUMIN (HUMAN) 25 G: 0.25 INJECTION, SOLUTION INTRAVENOUS at 01:18

## 2023-02-02 RX ADMIN — HEPARIN SODIUM 5000 UNITS: 5000 INJECTION INTRAVENOUS; SUBCUTANEOUS at 13:12

## 2023-02-02 RX ADMIN — SACUBITRIL AND VALSARTAN 1 TABLET: 24; 26 TABLET, FILM COATED ORAL at 17:34

## 2023-02-02 RX ADMIN — HEPARIN SODIUM 5000 UNITS: 5000 INJECTION INTRAVENOUS; SUBCUTANEOUS at 21:34

## 2023-02-02 RX ADMIN — CHLORHEXIDINE GLUCONATE 0.12% ORAL RINSE 15 ML: 1.2 LIQUID ORAL at 20:22

## 2023-02-02 RX ADMIN — ASPIRIN 81 MG: 81 TABLET, COATED ORAL at 08:05

## 2023-02-02 NOTE — PLAN OF CARE
Problem: OCCUPATIONAL THERAPY ADULT  Goal: Performs self-care activities at highest level of function for planned discharge setting  See evaluation for individualized goals  Description: Treatment Interventions: ADL retraining, Functional transfer training, UE strengthening/ROM, Endurance training, Cognitive reorientation, Patient/family training, Compensatory technique education, Continued evaluation, Energy conservation, Activityengagement          See flowsheet documentation for full assessment, interventions and recommendations  Note: Limitation: Decreased ADL status, Decreased UE strength, Decreased Safe judgement during ADL, Decreased cognition, Decreased endurance, Decreased self-care trans, Decreased high-level ADLs  Prognosis: Good  Assessment: Pt is a 58 y o  male seen for OT evaluation s/p admit to [facility] on 2/1/2023 w/ Elevated troponin  Pt presented to the ED with c/o SOB pt is now s/p cardiac catheterization  Comorbidities affecting pt's functional performance at time of assessment include: HTN, DM, Neuropathy, Hypercholesteremia, PVCs, HLD, Osteomyelitis, CVA, GERD, infectious viral hepatitis, CDK, TIA  Personal factors affecting pt at time of IE include:steps to enter environment, difficulty performing ADLS, difficulty performing IADLS , limited insight into deficits and health management   Prior to admission, pt was I with ADLS and functional mobility, wife and other family complete IADLS  Upon evaluation: Pt presents seated EOB and is agreeable to participate, all vitals WNL  Pt requires overall Mod A x 1, 2* the following deficits impacting occupational performance: weakness, decreased strength, decreased balance, decreased tolerance, impaired problem solving, decreased safety awareness and decreased coping skills  Pt resting in chair at end of session with all needs in reach, alarm on, all lines in place and SCD's on   Pt to benefit from continued skilled OT tx while in the hospital to address deficits as defined above and maximize level of functional independence w ADL's and functional mobility  Occupational Performance areas to address include: grooming, bathing/shower, toilet hygiene, dressing, health maintenance, functional mobility, community mobility, clothing management and social participation  The patient's raw score on the AM-PAC Daily Activity inpatient short form is 16, standardized score is 35 96, less than 39 4  Patients at this level are likely to benefit from discharge to post-acute rehabilitation services  Please refer to the recommendation of the Occupational Therapist for safe discharge planning       OT Discharge Recommendation: Post acute rehabilitation services

## 2023-02-02 NOTE — ASSESSMENT & PLAN NOTE
· On Carvedilol 12 5 mg BID  · Torsemide increased and Entresto added as above  · Meds with hold parameters   · Had hypotension overnight - received Albumin IV  · BP acceptable at present

## 2023-02-02 NOTE — PROGRESS NOTES
[address] [facility]  Progress Note - Rafaela Hassan 1960, 58 y o  male MRN: [id number]  Unit/Bed#: ICCU 201-01 Encounter: [id number]  Primary Care Provider: Mercedes Joy DO   Date and time admitted to hospital: 2/1/2023  9:27 AM    * Type 1 non-ST elevation myocardial infarction (NSTEMI) s/p ADE to in-stent restenosis of mid LAD  Assessment & Plan  · Presented to [address] [place] [address] on 1/29/23 with shortness of breath and chest pain  · Volume overloaded on presentation  · Trop - 4037 -- 67,706 -- 8884  · Echo - EF 25 - 30% (from 65% on 4/20/22)  · H/o multivessel CAD with multivessel PCI  · Transferred to \[facility]\"" for complex cath  · Cath (2/1/23) - Multivessel CAD with marked progression since the prior angiographic study in May 2022  Stents placed in the mid LAD exhibited significant discrete and-stent restenosis  The first OM branch, stented in 5/22, has become occluded  The distal RCA beyond the PDA has akso become occluded  LAD in--stent restenosis was interrogated by IVUS imaging and successfully treated with placement of a 4 0x13mm Orsiro ADE  An attempt was made to wire the occluded OM branch, but the wire could not be advanced beyond the stent in mid vessel     · Ct ASA, Effient, statin, BB    CAD, multiple vessel  Assessment & Plan  • Progressed from May 2022  • Plan as above    Ischemic cardiomyopathy  Assessment & Plan  · Drop in EF from 65 to 25-30% in the setting of type I NSTEMI  · Entresto added today  · Home Carvedilol continued  · Was volume overloaded on presentation to [facility] on 1/29/23  · Volume maintenance through HD  · Home Torsemide dose increased from 50 to 100 mg  · Lifevest before discharge    ESRD on dialysis [facility])  Assessment & Plan  Lab Results   Component Value Date    EGFR 4 02/02/2023    EGFR 4 02/01/2023    EGFR 7 01/31/2023    CREATININE 11 90 (H) 02/02/2023    CREATININE 9 94 (H) 02/01/2023    CREATININE 7 29 (H) 01/31/2023   • HD on MWF - managed per nephrology - input appreciated      Type 2 diabetes mellitus [facility])  Assessment & Plan  Lab Results   Component Value Date    HGBA1C 5 5 12/23/2022       Recent Labs     02/01/23  2127 02/02/23  0605 02/02/23  1217 02/02/23  1625   POCGLU 124 89 116 102       Blood Sugar Average: Last 72 hrs:  (P) 107     · Home regimen: Lantus 14 units daily, Humalog 4 units TID  · Blood sugars acceptable on SSI currently - continue  · Monitor blood sugars and adjust insulin accordingly    History of stroke  Assessment & Plan  · H/o left MCA CVA  · Ct ASA, Effient, statin    Primary hypertension  Assessment & Plan  · On Carvedilol 12 5 mg BID  · Torsemide increased and Entresto added as above  · Meds with hold parameters   · Had hypotension overnight - received Albumin IV  · BP acceptable at present    Moderate AS (aortic stenosis)  Assessment & Plan  · Similar to prior echo  · Monitor volume status    Mixed hyperlipidemia  Assessment & Plan  · Continue statin    Mood disorder ([address] )  Assessment & Plan  · Was begun on Depakote sprinkles 125 mg BID when seen by psychiatry at [facility] on 1/6/23  · Current home dose 250 mg BID - continue  · Outpatient follow-up    Hyponatremia  Assessment & Plan  · Management through HD    VTE Pharmacologic Prophylaxis: VTE Score: 4 Moderate Risk (Score 3-4) - Pharmacological DVT Prophylaxis Ordered: heparin  Patient Centered Rounds: Discussed with RN    Education and Discussions with Family / Patient: Updated  (wife and son) at bedside  Time Spent for Care: 20 minutes  More than 50% of total time spent on counseling and coordination of care as described above  Current Length of Stay: 1 day(s)  Current Patient Status: Inpatient   Certification Statement: The patient will continue to require additional inpatient hospital stay due to Type 1 NSTEMI    Code Status: Level 1 - Full Code    Subjective:   No chest pain or shortness of breath       Objective:     Vitals:   Temp (24hrs), Av 1 °F (36 7 °C), Min:97 4 °F (36 3 °C), Max:99 °F (37 2 °C)    Temp:  [97 4 °F (36 3 °C)-99 °F (37 2 °C)] 99 °F (37 2 °C)  HR:  [66-82] 74  Resp:  [15-17] 15  BP: ()/(31-78) 124/47  SpO2:  [93 %-99 %] 94 %  Body mass index is 31 03 kg/m²  Physical Exam:   Physical Exam  Vitals reviewed  HENT:      Head: Normocephalic  Nose: Nose normal       Mouth/Throat:      Mouth: Mucous membranes are moist    Eyes:      Extraocular Movements: Extraocular movements intact  Cardiovascular:      Rate and Rhythm: Normal rate and regular rhythm  Pulmonary:      Effort: Pulmonary effort is normal  No respiratory distress  Breath sounds: Normal breath sounds  No wheezing  Abdominal:      General: Bowel sounds are normal  There is no distension  Palpations: Abdomen is soft  Tenderness: There is no abdominal tenderness  Musculoskeletal:         General: No swelling  Cervical back: Neck supple  Skin:     General: Skin is warm and dry  Neurological:      General: No focal deficit present  Mental Status: He is alert  Additional Data:     Labs:  Results from last 7 days   Lab Units 23  0615 23  0729   WBC Thousand/uL 6 22 8 27   HEMOGLOBIN g/dL 10 1* 10 7*   HEMATOCRIT % 31 5* 33 7*   PLATELETS Thousands/uL 168 180   NEUTROS PCT %  --  71   LYMPHS PCT %  --  16   MONOS PCT %  --  11   EOS PCT %  --  1     Results from last 7 days   Lab Units 23  0615 23  0729 23  0521   SODIUM mmol/L 131*   < > 135   POTASSIUM mmol/L 4 6   < > 4 3   CHLORIDE mmol/L 93*   < > 93*   CO2 mmol/L 22   < > 28   BUN mg/dL 112*   < > 52*   CREATININE mg/dL 11 90*   < > 7 29*   ANION GAP mmol/L 16*   < > 14*   CALCIUM mg/dL 8 4   < > 8 9   ALBUMIN g/dL  --   --  4 0   TOTAL BILIRUBIN mg/dL  --   --  1 04*   ALK PHOS U/L  --   --  56   ALT U/L  --   --  13   AST U/L  --   --  21   GLUCOSE RANDOM mg/dL 85   < > 118    < > = values in this interval not displayed  Results from last 7 days   Lab Units 01/29/23  0821   INR  1 03     Results from last 7 days   Lab Units 02/02/23  1625 02/02/23  1217 02/02/23  0605 02/01/23  2127 02/01/23  1842 02/01/23  1157 02/01/23  0744 01/31/23  2102 01/31/23  1628 01/31/23  1104 01/31/23  0741 01/30/23  2055   POC GLUCOSE mg/dl 102 116 89 124 98 113 118 125 192* 134 108 137         Results from last 7 days   Lab Units 01/29/23  2114 01/29/23  0844 01/29/23  0821   LACTIC ACID mmol/L 1 2  --  1 1   PROCALCITONIN ng/ml  --  0 31*  --        Lines/Drains:  Invasive Devices     Peripheral Intravenous Line  Duration           Peripheral IV 02/01/23 Right;Ventral (anterior) Forearm 1 day    Peripheral IV 02/02/23 Right;Upper;Ventral (anterior) Arm <1 day          Line  Duration           Hemodialysis AV Fistula Left Upper arm -- days                Recent Cultures (last 7 days):   Results from last 7 days   Lab Units 01/29/23  0844 01/29/23  0821   BLOOD CULTURE  No Growth After 4 Days  No Growth After 4 Days         Last 24 Hours Medication List:   Current Facility-Administered Medications   Medication Dose Route Frequency Provider Last Rate   • acetaminophen  975 mg Oral Q8H PRN Lori Alford MD     • aspirin  81 mg Oral Daily Lori Alford MD     • atorvastatin  40 mg Oral Daily With Guille Garcia MD     • calcium acetate  667 mg Oral TID With Meals Lori Alford MD     • carvedilol  12 5 mg Oral BID With Meals Lori Alford MD     • chlorhexidine  15 mL Mouth/Throat Q12H Joesphine Lundborg, MD     • divalproex sodium  250 mg Oral Q12H Kaley Prater MD     • guaiFENesin  200 mg Oral Q4H PRN Lori Alford MD     • heparin (porcine)  5,000 Units Subcutaneous Q8H [facility]     • insulin lispro  1-5 Units Subcutaneous 4x Daily (AC & HS) Lori Alford MD     • melatonin  3 mg Oral HS Lori Alford MD     • nitroglycerin  0 4 mg Sublingual Q5 Min PRN Lori Alford MD     • prasugrel  5 mg Oral Daily Leora Jimenez MD     • sacubitril-valsartan  1 tablet Oral BID Onetha BRITTANY Ellis     • torsemide  100 mg Oral Daily Leora Jimenez MD          Today, Patient Was Seen By: Larayne Gowers, MD    **Please Note: This note may have been constructed using a voice recognition system  **

## 2023-02-02 NOTE — ASSESSMENT & PLAN NOTE
Lab Results   Component Value Date    EGFR 4 02/02/2023    EGFR 4 02/01/2023    EGFR 7 01/31/2023    CREATININE 11 90 (H) 02/02/2023    CREATININE 9 94 (H) 02/01/2023    CREATININE 7 29 (H) 01/31/2023   • HD on MWF - managed per nephrology - input appreciated

## 2023-02-02 NOTE — PHYSICAL THERAPY NOTE
Physical Therapy treatment note     Patient Name: Yared Joaquin    INIYW'S Date: 2/2/2023     Problem List  Principal Problem:    Elevated troponin with chest pain  Active Problems:    Diabetic polyneuropathy associated with type 2 diabetes mellitus ([facility] )    Pre-kidney transplant, listed    Anemia    ESRD on dialysis [facility])    Coronary artery disease involving native coronary artery of native heart without angina pectoris    Hyponatremia       Past Medical History  Past Medical History:   Diagnosis Date    Cerebrovascular accident (CVA) due to thrombosis of left middle cerebral artery ([facility] ) 7/29/2018    Chronic kidney disease     Diabetes mellitus ([facility] )     GERD (gastroesophageal reflux disease)     Hypercholesteremia     Hyperlipidemia     Hypertension     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    Neuropathy     Obesity     Osteomyelitis ([facility] )     last assessed 11/4/16    PVC's (premature ventricular contractions)     sees cardiology Dr Shweta camargo    Stroke [facility])     last weeof July 2018 [address] [place] [address]    TIA (transient ischemic attack) 10/28/2018        Past Surgical History  Past Surgical History:   Procedure Laterality Date    ABDOMINAL SURGERY      CARDIAC CATHETERIZATION N/A 5/2/2022    Procedure: Cardiac Coronary Angiogram;  Surgeon: Zhnae Ybarra MD;  Location: [facility] CARDIAC CATH LAB; Service: Cardiology    CARDIAC CATHETERIZATION N/A 5/2/2022    Procedure: Cardiac pci;  Surgeon: Zhane Ybarra MD;  Location: [facility] CARDIAC CATH LAB; Service: Cardiology    CARDIAC CATHETERIZATION  2/1/2023    Procedure: Cardiac catheterization;  Surgeon: Zhane Ybarra MD;  Location: [facility] CARDIAC CATH LAB; Service: Cardiology    CARDIAC CATHETERIZATION N/A 2/1/2023    Procedure: Cardiac pci;  Surgeon: Zhane Ybarra MD;  Location: [facility] CARDIAC CATH LAB;   Service: Cardiology    CARDIAC CATHETERIZATION N/A 2/1/2023    Procedure: Cardiac Coronary Angiogram;  Surgeon: Maria Luz Espinoza MD;  Location: [facility] CARDIAC CATH LAB; Service: Cardiology    CARDIAC CATHETERIZATION N/A 2/1/2023    Procedure: Cardiac other-IVUS;  Surgeon: Maria Luz Espinoza MD;  Location: [facility] CARDIAC CATH LAB; Service: Cardiology    CHOLECYSTECTOMY      Percutaneous    COLONOSCOPY      CYSTOSCOPY      OTHER SURGICAL HISTORY      "stimulator to control bowel movements"    MA ESOPHAGOGASTRODUODENOSCOPY TRANSORAL DIAGNOSTIC N/A 9/27/2016    Procedure: ESOPHAGOGASTRODUODENOSCOPY (EGD); Surgeon: Jj Mackey MD;  Location: [facility] GI LAB; Service: Gastroenterology    MA LAPAROSCOPY SURG CHOLECYSTECTOMY N/A 2/29/2016    Procedure: LAPAROSCOPIC CHOLECYSTECTOMY ;  Surgeon: Derek Bedoya DO;  Location: [facility] Main OR;  Service: General    ROTATOR CUFF REPAIR Right     TOE AMPUTATION Right 10/28/2016    Procedure: 3RD TOE AMPUTATION ;  Surgeon: Amrik Rivera DPM;  Location: [facility] Main OR;  Service:          02/02/23 1327   PT Last Visit   PT Visit Date 02/02/23   Note Type   Note type Evaluation   Pain Assessment   Pain Assessment Tool 0-10   Pain Score No Pain   Restrictions/Precautions   Weight Bearing Precautions Per Order No   Other Precautions Cognitive;Multiple lines; Fall Risk   Home Living   Type of Home House   Home Layout Multi-level   Additional Comments Pt lives with his SO in a bi level home with 2 RAFAT  Pt then has either 2 steps up or 2 steps down  Pt's son lives close and able to assist as needed  Pt was I for ADL's and mobility prior  Pt required A with IADL's prior  Pt did not use any DME prior  Prior Function   Level of [place] Independent with ADLs; Independent with functional mobility; Needs assistance with IADLS   Lives With Spouse   Receives Help From Family   IADLs Family/Friend/Other provides transportation; Family/Friend/Other provides meals; Family/Friend/Other provides medication management   Falls in the last 6 months 0   Vocational Retired   General   Family/Caregiver Present Yes   Cognition Overall Cognitive Status Impaired   Attention Attends with cues to redirect   Orientation Level Oriented X4   RLE Assessment   RLE Assessment   (grossly 3/5 observed with mobility)   LLE Assessment   LLE Assessment   (grossyl 3/5 observed with mobility)   Bed Mobility   Additional Comments pt sitting EOB upon entering the room   Transfers   Sit to Stand 3  Moderate assistance   Additional items Assist x 1   Stand to Sit 3  Moderate assistance   Additional items Assist x 1   Ambulation/Elevation   Gait pattern Excessively slow; Step to; Foward flexed; Shuffling  (cues for safety with RW especailyl with turns, multiple LOB throughout, unsteady)   Gait Assistance 3  Moderate assist   Additional items Assist x 1  (assist of second for LOB during ambulation)   Assistive Device Rolling walker   Distance 40ftx1, 10 ftx1   Balance   Static Sitting Fair +   Dynamic Sitting Fair   Static Standing Fair -   Dynamic Standing Poor   Ambulatory Poor   Endurance Deficit   Endurance Deficit Yes   Activity Tolerance   Activity Tolerance Patient limited by fatigue   Medical Staff Made Aware OT   Nurse Made Aware nurse approved therapy session   Assessment   Prognosis Fair   Problem List Decreased strength;Decreased endurance; Impaired balance;Decreased mobility; Decreased cognition   Assessment Pt is a 57 yo male admitted to [place] [address] on 2/1/2023 s/p AMS  Pt is s/p cardiac cath  DX: NSTEMI, MVCAD, acute on chronic HF, cardiomyopathy, ESRD, HLD, DM, hx of L MCA CVA, hyponatremia, hypoxia, CAD, diabetic polyneuropathy  Two patient identifiers were used to confirm  Pt lives in a bi level home with 2 RAFAT  Pt then has 2 steps either down or up  Pt lives with his spouse  Pt has supportive family who live next door and are able to assist  Pt was I for ADL's and mobility prior  Pt required A with IADL's prior  Pt did not use any DME prior   Pt's impairments include reduced mobility, poor endurance, gait abnormalities, high risk of falling, reduced balance  These impairments limit the ability of the patient to perform mobility without increased assistance, return to PLOF and participate in everyday life activities  Pt would benefit from continued skilled therapy while in the hospital to improve overall mobility and work towards a safe d/c  Recommend discharge to rehab  At the end of the session the patient was left in seated position with call bell and phone within reach  Pt's family present during session and able to assist with PLOF  Barriers to Discharge Inaccessible home environment;Decreased caregiver support   Goals   STG Expiration Date 02/16/23   Short Term Goal #1 STG 1: Pt will perform transfers at a MI level to return to baseline of function  STG 2: Pt will ambulate 150ft with RW at a MI level to reduce the level of assistance needed upon d/c home  STG 3: Pt will negotiate 2 steps with HR at a I level to ensure safety with activity when able to ambulate 50ft at a min A level without LOB  STG 4: Pt will perform bed mobility at a I to safety return to PLOF  PT Treatment Day 0   Plan   Treatment/Interventions Functional transfer training;LE strengthening/ROM; Therapeutic exercise;Elevations; Endurance training;Gait training;Equipment eval/education; Bed mobility;OT   PT Frequency 2-3x/wk   Recommendation   PT Discharge Recommendation Post acute rehabilitation services   Equipment Recommended [facility] Recommended Wheeled walker   Change/add to [place]?  No   AM-PAC Basic Mobility Inpatient   Turning in Flat Bed Without Bedrails 3   Lying on Back to Sitting on Edge of Flat Bed Without Bedrails 3   Moving Bed to Chair 2   Standing Up From Chair Using Arms 2   Walk in Room 2   Climb 3-5 Stairs With Railing 1   Basic Mobility Inpatient Raw Score 13   Basic Mobility Standardized Score 33 99   Highest Level Of Mobility   -HLM Goal 4: Move to chair/commode   -HLM Achieved 7: Walk 25 feet or more   Keke Acosta, PT, DPT

## 2023-02-02 NOTE — PROGRESS NOTES
NEPHROLOGY PROGRESS NOTE   Laquita Contreras 58 y o  male MRN: [id number]  Unit/Bed#: ICCU 201-01 Encounter: [id number]      Hemodialysis procedure note: The patient was seen on hemodialysis today at 9:10 AM  His last treatment was on Tuesday, he is normally a Monday Wednesday Friday treatment  2-hour treatment today using an F1 80 dialyzer, 138 sodium, 2 potassium, 35 bicarb, 2 5 calcium, 400 blood flow rate with a 500 dialysis flow rate with ultrafiltration scheduled for about 1 L  When patient was seen he did have a choking episode with his eggs, did have some hypoxia but subsequently improved symptomatically stable his family was at his bedside    ASSESSMENT & PLAN    45-year-old male with a history of end-stage kidney disease on hemodialysis presented with shortness of breath history of coronary artery disease    1  End-stage kidney disease on hemodialysis Monday Wednesday Friday [facility]  o Monday Wednesday Friday and with a left AV fistula that is in use  o Electrolytes and acid-base status are stable  o We will place back on his routine treatment schedule tomorrow    2  Type I NSTEMI status post drug-eluting stent, ischemic cardiomyopathy  o Carvedilol 12 5 mg 2 times daily  o Looking into Entresto  o Torsemide 100 mg daily  o Prasugrel 5 mg daily  o Cardiology following  o Statin therapy  o Aspirin    3  Anemia of chronic kidney disease  o Current hemoglobins are stable post cardiac catheterization  o Ongoing management as an outpatient    4  Hyponatremia  o Last sodium was low at 131, using a 138 sodium bath and will continue fluid removal    5  Volume overload  o This has significantly improved he has no hypoxia  o We will continue to challenge his dry weight on dialysis as blood pressure allows    6  CKD bone mineral disease  o Continue calcium acetate for hyperphosphatemia    7   History of a CVA  o This is stable      DISCUSSION:    After discussion with cardiology we agreed on trying dialysis tomorrow after Kalyani Cabrera was initiated if covered by insurance, to ensure his hemodynamics are stable on dialysis  Will place back on his Monday Wednesday Friday schedule tomorrow    This plan of care was also discussed with the patient's family who is at his bedside    SUBJECTIVE:    Patient was seen today  Sitting up resting comfortably    No acute complaints overnight did require some albumin doses because of some hypotension      12 point review of systems was otherwise negative besides what is mentioned above      Medications:    Current Facility-Administered Medications:   •  acetaminophen (TYLENOL) tablet 975 mg, 975 mg, Oral, Q8H PRN, Cher Braun MD  •  aspirin (ECOTRIN LOW STRENGTH) EC tablet 81 mg, 81 mg, Oral, Daily, Cher Braun MD, 81 mg at 02/02/23 0805  •  atorvastatin (LIPITOR) tablet 40 mg, 40 mg, Oral, Daily With Hollie Olivera MD  •  calcium acetate (PHOSLO) capsule 667 mg, 667 mg, Oral, TID With Meals, Cher Braun MD, 667 mg at 02/02/23 0805  •  carvedilol (COREG) tablet 12 5 mg, 12 5 mg, Oral, BID With Meals, Cher Braun MD  •  chlorhexidine (PERIDEX) 0 12 % oral rinse 15 mL, 15 mL, Mouth/Throat, Q12H [address], Cher Braun MD, 15 mL at 02/02/23 0804  •  guaiFENesin (ROBITUSSIN) oral liquid 200 mg, 200 mg, Oral, Q4H PRN, Cher Braun MD  •  heparin (porcine) subcutaneous injection 5,000 Units, 5,000 Units, Subcutaneous, Q8H [address], Tracee Dominguez PA-C, 5,000 Units at 02/02/23 0617  •  insulin lispro (HumaLOG) 100 units/mL subcutaneous injection 1-5 Units, 1-5 Units, Subcutaneous, 4x Daily (AC & HS) **AND** Fingerstick Glucose (POCT), , , 4x Daily AC and at bedtime, Cher Braun MD  •  melatonin tablet 3 mg, 3 mg, Oral, HS, Cher Braun MD  •  nitroglycerin (NITROSTAT) SL tablet 0 4 mg, 0 4 mg, Sublingual, Q5 Min PRN, Cher Braun MD  •  prasugrel (EFFIENT) tablet 5 mg, 5 mg, Oral, Daily, Cher Braun MD, 5 mg at 02/02/23 0805  •  torsemide (DEMADEX) tablet 100 mg, 100 mg, Oral, Daily, Esteban Calero MD  •  valproate (DEPACON) 250 mg in sodium chloride 0 9 % 50 mL IVPB, 250 mg, Intravenous, Q12H [address], Esteban Calero MD, Last Rate: 50 mL/hr at 02/01/23 2135, 250 mg at 02/01/23 2135    OBJECTIVE:    Vitals:    02/02/23 0830 02/02/23 0900 02/02/23 0930 02/02/23 1003   BP: 107/59 151/78 111/65 122/74   BP Location:    Right arm   Pulse: 72 80 82 78   Resp: 16 15 16 15   Temp:       TempSrc:       SpO2:  96%     Weight:            Temp:  [97 4 °F (36 3 °C)-98 3 °F (36 8 °C)] 97 9 °F (36 6 °C)  HR:  [66-82] 78  Resp:  [15-17] 15  BP: ()/(31-78) 122/74  SpO2:  [93 %-99 %] 96 %     Body mass index is 31 03 kg/m²  Weight (last 2 days)     Date/Time Weight    02/02/23 0700 95 3 (210 1)    02/02/23 0600 95 (209 5)          I/O last 3 completed shifts: In: 910 [P O :610; IV Piggyback:300]  Out: -     I/O this shift: In: 900 [P O :400; I V :500]  Out: 1200 [Urine:200;  Other:1000]      Physical exam:    General: no acute distress, cooperative  Eyes: conjunctivae pink, anicteric sclerae  ENT: lips and mucous membranes moist, no exudates, normal external ears  Neck: ROM intact, no JVD  Chest: No respiratory distress, no accessory muscle use  CVS: normal rate, non pericardial friction rub  Abdomen: soft, non-tender, non-distended, normoactive bowel sounds  Extremities: no edema of both legs, fistula is in use  Skin: no rash  Neuro: awake, alert, oriented, grossly intact  Psych:  Pleasant affect    Invasive Devices:      Lab Results:   Results from last 7 days   Lab Units 02/02/23  0615 02/01/23  0729 01/31/23  0521 01/30/23  1505 01/30/23  0458 01/29/23  1415 01/29/23  0844 01/29/23  0821   WBC Thousand/uL 6 22 8 27 8 33  --  11 00*  --   --  11 83*   HEMOGLOBIN g/dL 10 1* 10 7* 9 7*  --  9 6*  --   --  10 9*   I STAT HEMOGLOBIN g/dl  --   --   --  9 9*  --   --   --   --    HEMATOCRIT % 31 5* 33 7* 29 9*  --  30 2*  --   --  35 6*   HEMATOCRIT, ISTAT %  --   --   --  29*  --   --   -- --    PLATELETS Thousands/uL 168 180 160  --  148* 172  --  181   POTASSIUM mmol/L 4 6 4 4 4 3  --  5 2  --   --  3 5   CHLORIDE mmol/L 93* 89* 93*  --  98  --   --  114*   CO2 mmol/L 22 25 28  --  25  --   --  20*   CO2, I-STAT mmol/L  --   --   --  25  --   --   --   --    BUN mg/dL 112* 89* 52*  --  68*  --   --  36*   CREATININE mg/dL 11 90* 9 94* 7 29*  --  9 62*  --   --  5 40*   CALCIUM mg/dL 8 4 8 8 8 9  --  8 8  --   --  6 0*   MAGNESIUM mg/dL 3 6* 3 2* 3 0*  --   --   --   --  1 5*   PHOSPHORUS mg/dL 6 7*  --  5 9*  --   --   --   --  3 0   ALK PHOS U/L  --   --  56  --  60  --   --  51   ALT U/L  --   --  13  --  13  --   --  7   AST U/L  --   --  21  --  27  --   --  13   GLUCOSE, ISTAT mg/dl  --   --   --  142*  --   --   --   --    BLOOD CULTURE   --   --   --   --   --   --  No Growth at 72 hrs  No Growth at 72 hrs  Portions of the record may have been created with voice recognition software  Occasional wrong word or "sound a like" substitutions may have occurred due to the inherent limitations of voice recognition software  Read the chart carefully and recognize, using context, where substitutions have occurred  If you have any questions, please contact the dictating provider

## 2023-02-02 NOTE — ASSESSMENT & PLAN NOTE
· Was begun on Depakote sprinkles 125 mg BID when seen by psychiatry at [facility] on 1/6/23  · Current home dose 250 mg BID - continue  · Outpatient follow-up

## 2023-02-02 NOTE — PROGRESS NOTES
General Cardiology   Progress Note -  Team One   Howard Ellsworth 58 y o  male MRN: [id number]  [facility]: [id number]    Assessment     1  NSTEMI s/p ADE to in-stent restenosis of mid LAD   [facility] with chest pain and SOB, volume overload  HS troponin 4037-->7138-->77785-->16,894-->8884  ECG- NSR with RBBB, inferolateral T wave abnormality  TTE showed drop in LV function to 25-30% with regional variation  [facility] 2/1/23- marked progression of CAD: Stent placed to 80% in-stent restenosis of mid LAD  Prior stent to OM1 occluded but wire could not be advanced so no intervention performed  Distal RCA now occluded beyond the PDA  See cath report for full details    DAPT with ASA and Effient     2  MVCAD with multiple prior interventions  See progress note from 2/1 for details of prior interventions  Has had marked progression of CAD since May 2022  On ASA, Effient, carvedilol, and statin  3  Acute on chronic HFrEF  Home diuretic: torsemide 100 mg daily  Previous dry weight: 225 lb  Down to 210 lb with HD this hospital stay  Weaned to room air, euvolemic on exam      4  New ischemic cardiomyopathy, LVEF 25-30%  TTE 1/30/23: LVEF 25-30% with regional variation, moderate AS, moderate MR  GDMT- carvedilol 12 5 mg BID     5  Moderate AS- unchanged from echo last year     6  ESRD on HD- nephrology following     7  HLD- TC 76, , HDL 30, LDL 21  On atorvastatin 40 mg daily     8  HTN- controlled on carvedilol 12 5 mg BID, 24 hour average /48  Some hypotension post procedure yesterday but now normotensive      9  Type 2 DM- A1c 5 5%     10  Hx of L MCA CVA     Plan  1  Continue DAPT with ASA and Effient  2  GDMT with carvedilol 12 5 mg BID  3  Attempt to add afterload reduction prior to discharge  Had some hypotension post procedure yesterday but BP stable on HD today  Will price check Entresto and plan to start later today vs tomorrow     4  Volume status stable, managed by nephrology  HD today  5  Strict I/Os, daily standing weights  6  Continue high intensity statin   7  Will order LifeVest at discharge given ICM with EF 25-30%  Patient has 24 hour family support to assist with management  8  Cardiac rehab at discharge    Subjective  Feeling well this morning  No chest pain or SOB overnight  Review of Systems   Constitutional: Negative for chills, malaise/fatigue and weight gain  Cardiovascular: Negative for chest pain, dyspnea on exertion, leg swelling, orthopnea, palpitations and syncope  Respiratory: Negative for cough, shortness of breath, sleep disturbances due to breathing and sputum production  Gastrointestinal: Negative for bloating, nausea and vomiting  Neurological: Negative for dizziness, light-headedness and weakness  Psychiatric/Behavioral: Negative for altered mental status  All other systems reviewed and are negative  Objective:   Vitals: Blood pressure 128/67, pulse 72, temperature 97 9 °F (36 6 °C), temperature source Oral, resp  rate 15, weight 95 3 kg (210 lb 1 6 oz), SpO2 94 %  , Body mass index is 31 03 kg/m²  ,     Systolic (42LGX), UJA:243 , Min:86 , AYLIN:473     Diastolic (59ZBE), AFW:11, Min:31, Max:67      Intake/Output Summary (Last 24 hours) at 2/2/2023 0825  Last data filed at 2/2/2023 0803  Gross per 24 hour   Intake 1110 ml   Output 200 ml   Net 910 ml     Wt Readings from Last 3 Encounters:   02/02/23 95 3 kg (210 lb 1 6 oz)   02/01/23 95 4 kg (210 lb 5 1 oz)   01/26/23 100 kg (221 lb)     Telemetry Review: No significant arrhythmias seen on telemetry review  NSR    Physical Exam  Vitals reviewed  Constitutional:       General: He is not in acute distress  Neck:      Vascular: No hepatojugular reflux or JVD  Cardiovascular:      Rate and Rhythm: Normal rate and regular rhythm  Heart sounds: Murmur heard  Systolic murmur is present  No friction rub  No gallop     Pulmonary:      Effort: Pulmonary effort is normal  No respiratory distress  Breath sounds: No rales  Comments: Decreased throughout, occasional expiratory wheezing  Room air  Abdominal:      General: Bowel sounds are normal  There is no distension  Palpations: Abdomen is soft  Tenderness: There is no abdominal tenderness  Musculoskeletal:         General: No tenderness  Normal range of motion  Cervical back: Neck supple  Right lower leg: No edema  Left lower leg: No edema  Skin:     General: Skin is warm and dry  Findings: No erythema  Comments: Right radial cath site band aid C/D/I  No hematoma, erythema, or active bleeding  Neurological:      Mental Status: He is alert and oriented to person, place, and time     Psychiatric:         Mood and Affect: Mood normal      LABORATORY RESULTS      CBC with diff:   Results from last 7 days   Lab Units 02/02/23  0615 02/01/23  0729 01/31/23  0521 01/30/23  1505 01/30/23  0458 01/29/23  1415 01/29/23  0821   WBC Thousand/uL 6 22 8 27 8 33  --  11 00*  --  11 83*   HEMOGLOBIN g/dL 10 1* 10 7* 9 7*  --  9 6*  --  10 9*   I STAT HEMOGLOBIN g/dl  --   --   --  9 9*  --   --   --    HEMATOCRIT % 31 5* 33 7* 29 9*  --  30 2*  --  35 6*   HEMATOCRIT, ISTAT %  --   --   --  29*  --   --   --    MCV fL 94 95 95  --  97  --  98   PLATELETS Thousands/uL 168 180 160  --  148* 172 181   MCH pg 30 1 30 1 30 9  --  30 8  --  29 9   MCHC g/dL 32 1 31 8 32 4  --  31 8  --  30 6*   RDW % 15 1 15 3* 15 6*  --  15 6*  --  15 2*   MPV fL 10 1 10 3 10 2  --  10 6 9 8 10 3   NRBC AUTO /100 WBCs  --  0  --   --  0  --  0     CMP:  Results from last 7 days   Lab Units 02/02/23  0615 02/01/23  0729 01/31/23  0521 01/30/23  1505 01/30/23  0458 01/29/23  0821   POTASSIUM mmol/L 4 6 4 4 4 3  --  5 2 3 5   CHLORIDE mmol/L 93* 89* 93*  --  98 114*   CO2 mmol/L 22 25 28  --  25 20*   CO2, I-STAT mmol/L  --   --   --  25  --   --    BUN mg/dL 112* 89* 52*  --  68* 36*   CREATININE mg/dL 11 90* 9 94* 7 29*  -- 9 62* 5 40*   GLUCOSE, ISTAT mg/dl  --   --   --  142*  --   --    CALCIUM mg/dL 8 4 8 8 8 9  --  8 8 6 0*   AST U/L  --   --  21  --  27 13   ALT U/L  --   --  13  --  13 7   ALK PHOS U/L  --   --  56  --  60 51   EGFR ml/min/1 73sq m 4 4 7  --  5 10     BMP:  Results from last 7 days   Lab Units 02/02/23  0615 02/01/23  0729 01/31/23  0521 01/30/23  1505 01/30/23  0458 01/29/23  0821   POTASSIUM mmol/L 4 6 4 4 4 3  --  5 2 3 5   CHLORIDE mmol/L 93* 89* 93*  --  98 114*   CO2 mmol/L 22 25 28  --  25 20*   CO2, I-STAT mmol/L  --   --   --  25  --   --    BUN mg/dL 112* 89* 52*  --  68* 36*   CREATININE mg/dL 11 90* 9 94* 7 29*  --  9 62* 5 40*   GLUCOSE, ISTAT mg/dl  --   --   --  142*  --   --    CALCIUM mg/dL 8 4 8 8 8 9  --  8 8 6 0*     Lab Results   Component Value Date    CREATININE 11 90 (H) 02/02/2023    CREATININE 9 94 (H) 02/01/2023    CREATININE 7 29 (H) 01/31/2023     Lab Results   Component Value Date    NTBNP 1,859 (H) 01/02/2022    NTBNP 1,259 (H) 12/30/2020    NTBNP 1,579 (H) 12/26/2020     Results from last 7 days   Lab Units 02/02/23  0615 02/01/23  0729 01/31/23  0521 01/29/23  0821   MAGNESIUM mg/dL 3 6* 3 2* 3 0* 1 5*     Results from last 7 days   Lab Units 01/29/23  0821   TSH 3RD GENERATON uIU/mL 1 966     Results from last 7 days   Lab Units 01/29/23  0821   INR  1 03     Lipid Profile:   Lab Results   Component Value Date    CHOL 203 (H) 08/10/2016     Lab Results   Component Value Date    HDL 30 (L) 01/30/2023    HDL 33 (L) 07/25/2022    HDL 31 (L) 06/20/2022     Lab Results   Component Value Date    LDLCALC 21 01/30/2023    LDLCALC 19 07/25/2022    LDLCALC 25 06/20/2022     Lab Results   Component Value Date    TRIG 123 01/30/2023    TRIG 151 (H) 07/25/2022    TRIG 182 (H) 06/20/2022     Cardiac testing:   Results for orders placed during the hospital encounter of 10/05/20    Echo complete with contrast if indicated    Narrative  [place] , [place] [address]  [phone]    Transthoracic Echocardiogram  2D, M-mode, Doppler, and Color Doppler    Study date:  06-Oct-2020    Patient: Lucero Hicks  MR number: [id number]  Account number: [de-identified]  : 1960  Age: 61 years  Gender: Male  Status: Inpatient  Location: Bedside  Height: 70 in  Weight: 239 6 lb  BP: 165/ 80 mmHg    Indications: Shortness of breath  Diagnoses: R06 02 - Shortness of breath    Sonographer:  Echo Jo RDCS  Primary Physician:  Viry Oconnell DO  Referring Physician:  Amanda De La Cruz MD  Group:  [facility]  Interpreting Physician:  Aliya Allred MD    SUMMARY    LEFT VENTRICLE:  Systolic function was normal by visual assessment  Ejection fraction was estimated to be 60 %  There were no regional wall motion abnormalities  Wall thickness was moderately increased  Features were consistent with a pseudonormal left ventricular filling pattern, with concomitant abnormal relaxation and increased filling pressure (grade 2 diastolic dysfunction)  LEFT ATRIUM:  The atrium was mildly dilated  RIGHT ATRIUM:  The atrium was mildly dilated  MITRAL VALVE:  There was moderate annular calcification  There was mild regurgitation  AORTIC VALVE:  The valve was trileaflet  Leaflets exhibited normal thickness, moderate calcification, and moderately reduced cuspal separation  Transaortic velocity was increased due to valvular stenosis  There was mild to moderate stenosis  There was mild regurgitation  TRICUSPID VALVE:  There was trace regurgitation  PULMONIC VALVE:  There was mild regurgitation  HISTORY: PRIOR HISTORY: DM2  CKD4  Hypertension  CVA  GERD  Dementia  PROCEDURE: The procedure was performed at the bedside  This was a routine study  The transthoracic approach was used  The study included complete 2D imaging, M-mode, complete spectral Doppler, and color Doppler  The heart rate was 98 bpm,  at the start of the study   Image quality was adequate  LEFT VENTRICLE: Size was normal  Systolic function was normal by visual assessment  Ejection fraction was estimated to be 60 %  There were no regional wall motion abnormalities  Wall thickness was moderately increased  DOPPLER: The ratio  of early ventricular filling to atrial contraction velocities was within the normal range  Features were consistent with a pseudonormal left ventricular filling pattern, with concomitant abnormal relaxation and increased filling pressure  (grade 2 diastolic dysfunction)  RIGHT VENTRICLE: The size was normal  Systolic function was normal  DOPPLER: Systolic pressure was not estimated  LEFT ATRIUM: The atrium was mildly dilated  RIGHT ATRIUM: The atrium was mildly dilated  MITRAL VALVE: There was moderate annular calcification  Valve structure was normal  There was normal leaflet separation  DOPPLER: The transmitral velocity was within the normal range  There was no evidence for stenosis  There was mild  regurgitation  AORTIC VALVE: The valve was trileaflet  Leaflets exhibited normal thickness, moderate calcification, and moderately reduced cuspal separation  DOPPLER: Transaortic velocity was increased due to valvular stenosis  There was mild to moderate  stenosis  There was mild regurgitation  TRICUSPID VALVE: The valve structure was normal  There was normal leaflet separation  DOPPLER: The transtricuspid velocity was within the normal range  There was no evidence for stenosis  There was trace regurgitation  PULMONIC VALVE: Leaflets exhibited normal thickness, no calcification, and normal cuspal separation  DOPPLER: The transpulmonic velocity was within the normal range  There was mild regurgitation  PERICARDIUM: There was no pericardial effusion  AORTA: The root exhibited normal size  SYSTEMIC VEINS: IVC: The inferior vena cava was normal in size and course   Respirophasic changes were normal     SYSTEM MEASUREMENT TABLES    2D  %FS: 36 09 %  Ao Diam: 3 8 cm  EDV(Teich): 182 77 ml  EF(Teich): 64 8 %  ESV(Teich): 64 33 ml  IVSd: 1 57 cm  LA Area: 24 31 cm2  LA Diam: 4 63 cm  LVEDV MOD A4C: 192 34 ml  LVEF MOD A4C: 65 32 %  LVESV MOD A4C: 66 7 ml  LVIDd: 6 04 cm  LVIDs: 3 86 cm  LVLd A4C: 9 02 cm  LVLs A4C: 7 15 cm  LVOT Diam: 2 21 cm  LVPWd: 1 61 cm  RA Area: 17 56 cm2  RVIDd: 4 cm  SV MOD A4C: 125 64 ml  SV(Teich): 118 44 ml    CW  AV Env  Ti: 330 16 ms  AV MaxP 64 mmHg  AV VTI: 57 9 cm  AV Vmax: 2 58 m/s  AV Vmean: 1 76 m/s  AV meanP 38 mmHg  TR MaxP 37 mmHg  TR Vmax: 2 71 m/s    MM  TAPSE: 1 84 cm    PW  FATUMA (VTI): 1 27 cm2  FATUMA Vmax: 1 42 cm2  AVAI (VTI): 0 cm2/m2  AVAI Vmax: 0 cm2/m2  E' Sept: 0 07 m/s  E/E' Sept: 12 69  LVOT Env  Ti: 335 87 ms  LVOT VTI: 19 11 cm  LVOT Vmax: 0 95 m/s  LVOT Vmean: 0 57 m/s  LVOT maxPG: 3 63 mmHg  LVOT meanP 61 mmHg  LVSI Dopp: 32 42 ml/m2  LVSV Dopp: 73 27 ml  MV A Carlin: 0 64 m/s  MV Dec Bowman: 4 7 m/s2  MV DecT: 189 67 ms  MV E Carlin: 0 89 m/s  MV E/A Ratio: 1 38  MV PHT: 55 01 ms  MVA By PHT: 4 cm2    [facility] Accredited Echocardi[facility]    Prepared and electronically signed by    Jamee Mak MD  Signed 06-Oct-2020 16:31:49    Meds/Allergies   all current active meds have been reviewed and current meds:   Current Facility-Administered Medications   Medication Dose Route Frequency   • acetaminophen (TYLENOL) tablet 975 mg  975 mg Oral Q8H PRN   • aspirin (ECOTRIN LOW STRENGTH) EC tablet 81 mg  81 mg Oral Daily   • atorvastatin (LIPITOR) tablet 40 mg  40 mg Oral Daily With Dinner   • calcium acetate (PHOSLO) capsule 667 mg  667 mg Oral TID With Meals   • carvedilol (COREG) tablet 12 5 mg  12 5 mg Oral BID With Meals   • chlorhexidine (PERIDEX) 0 12 % oral rinse 15 mL  15 mL Mouth/Throat Q12H [facility]   • guaiFENesin (ROBITUSSIN) oral liquid 200 mg  200 mg Oral Q4H PRN   • heparin (porcine) subcutaneous injection 5,000 Units  5,000 Units Subcutaneous Q8H [facility]   • insulin lispro (HumaLOG) 100 units/mL subcutaneous injection 1-5 Units  1-5 Units Subcutaneous 4x Daily (AC & HS)   • melatonin tablet 3 mg  3 mg Oral HS   • nitroglycerin (NITROSTAT) SL tablet 0 4 mg  0 4 mg Sublingual Q5 Min PRN   • prasugrel (EFFIENT) tablet 5 mg  5 mg Oral Daily   • torsemide (DEMADEX) tablet 100 mg  100 mg Oral Daily   • valproate (DEPACON) 250 mg in sodium chloride 0 9 % 50 mL IVPB  250 mg Intravenous Q12H [address]     Medications Prior to Admission   Medication   • aspirin (ECOTRIN LOW STRENGTH) 81 mg EC tablet   • atorvastatin (LIPITOR) 40 mg tablet   • Blood Glucose Monitoring Suppl (True Metrix Meter) w/Device KIT   • Blood Pressure Monitoring (BLOOD PRESSURE CUFF) MISC   • calcium acetate (CALPHRON) 667 mg   • carvedilol (COREG) 12 5 mg tablet   • Cholecalciferol (Vitamin D3) 1 25 MG (44816 UT) CAPS   • divalproex sodium (DEPAKOTE SPRINKLE) 125 MG capsule   • glucose blood (True Metrix Blood Glucose Test) test strip   • insulin glargine (Lantus) 100 units/mL subcutaneous injection   • insulin lispro (HumaLOG KwikPen) 100 units/mL injection pen   • Insulin Pen Needle (BD Pen Needle Adina U/F) 32G X 4 MM MISC   • Insulin Syringe-Needle U-100 (B-D INS SYRINGE 0 5CC/30GX1/2") 30G X 1/2" 0 5 ML MISC   • Lancets Ultra Thin 30G MISC   • Methoxy PEG-Epoetin Beta 30 MCG/0 3ML SOSY   • ONETOUCH DELICA LANCETS 85Z MISC   • prasugrel (EFFIENT) tablet   • torsemide (DEMADEX) 100 mg tablet     Counseling / Coordination of Care  Total floor / unit time spent today 20 minutes  Greater than 50% of total time was spent with the patient and / or family counseling and / or coordination of care  ** Please Note: Dragon 360 Dictation voice to text software may have been used in the creation of this document   **

## 2023-02-02 NOTE — PLAN OF CARE
Net UF goal 0 5kg over 2 hours as jose miguel via L AVF  See flowsheets for further assessment details  Post-Dialysis RN Treatment Note    Blood Pressure:  Pre 117/54 mm/Hg  Post 122/74 mmHg   Weight:  Pre 95 3 kg   Post 94 8 kg   Mode of weight measurement: Standing Scale   Volume Removed  500 ml    Treatment duration 120 minutes    NS given  No    Treatment shortened?  No   Medications given during Rx None Reported   Estimated Kt/V  None Reported   Access type: AV fistula   Access Issues: No    Report called to primary nurse   Yes, B S , RN

## 2023-02-02 NOTE — ASSESSMENT & PLAN NOTE
Lab Results   Component Value Date    HGBA1C 5 5 12/23/2022       Recent Labs     02/01/23  2127 02/02/23  0605 02/02/23  1217 02/02/23  1625   POCGLU 124 89 116 102       Blood Sugar Average: Last 72 hrs:  (P) 107     · Home regimen: Lantus 14 units daily, Humalog 4 units TID  · Blood sugars acceptable on SSI currently - continue  · Monitor blood sugars and adjust insulin accordingly

## 2023-02-02 NOTE — PLAN OF CARE
Problem: PAIN - ADULT  Goal: Verbalizes/displays adequate comfort level or baseline comfort level  Description: Interventions:  - Encourage patient to monitor pain and request assistance  - Assess pain using appropriate pain scale  - Administer analgesics based on type and severity of pain and evaluate response  - Implement non-pharmacological measures as appropriate and evaluate response  - Consider cultural and social influences on pain and pain management  - Notify physician/advanced practitioner if interventions unsuccessful or patient reports new pain  Outcome: Progressing     Problem: INFECTION - ADULT  Goal: Absence or prevention of progression during hospitalization  Description: INTERVENTIONS:  - Assess and monitor for signs and symptoms of infection  - Monitor lab/diagnostic results  - Monitor all insertion sites, i e  indwelling lines, tubes, and drains  - Monitor endotracheal if appropriate and nasal secretions for changes in amount and color  - [place] appropriate cooling/warming therapies per order  - Administer medications as ordered  - Instruct and encourage patient and family to use good hand hygiene technique  - Identify and instruct in appropriate isolation precautions for identified infection/condition  Outcome: Progressing  Goal: Absence of fever/infection during neutropenic period  Description: INTERVENTIONS:  - Monitor WBC    Outcome: Progressing     Problem: SAFETY ADULT  Goal: Patient will remain free of falls  Description: INTERVENTIONS:  - Educate patient/family on patient safety including physical limitations  - Instruct patient to call for assistance with activity   - Consult OT/PT to assist with strengthening/mobility   - Keep Call bell within reach  - Keep bed low and locked with side rails adjusted as appropriate  - Keep care items and personal belongings within reach  - Initiate and maintain comfort rounds  - Make Fall Risk Sign visible to staff  - Offer Toileting every 2 Hours, in advance of need  - Initiate/Maintain alarm  - Obtain necessary fall risk management equipment  - Apply yellow socks and bracelet for high fall risk patients  - Consider moving patient to room near nurses station  Outcome: Progressing  Goal: Maintain or return to baseline ADL function  Description: INTERVENTIONS:  -  Assess patient's ability to carry out ADLs; assess patient's baseline for ADL function and identify physical deficits which impact ability to perform ADLs (bathing, care of mouth/teeth, toileting, grooming, dressing, etc )  - Assess/evaluate cause of self-care deficits   - Assess range of motion  - Assess patient's mobility; develop plan if impaired  - Assess patient's need for assistive devices and provide as appropriate  - Encourage maximum independence but intervene and supervise when necessary  - Involve family in performance of ADLs  - Assess for home care needs following discharge   - Consider OT consult to assist with ADL evaluation and planning for discharge  - Provide patient education as appropriate  Outcome: Progressing  Goal: Maintains/Returns to pre admission functional level  Description: INTERVENTIONS:  - Perform BMAT or MOVE assessment daily    - Set and communicate daily mobility goal to care team and patient/family/caregiver  - Collaborate with rehabilitation services on mobility goals if consulted  - Perform Range of Motion 3 times a day  - Reposition patient every 2 hours    - Dangle patient 3 times a day  - Stand patient 3 times a day  - Ambulate patient 3 times a day  - Out of bed to chair 3 times a day   - Out of bed for meals 3 times a day  - Out of bed for toileting  - Record patient progress and toleration of activity level   Outcome: Progressing     Problem: DISCHARGE PLANNING  Goal: Discharge to home or other facility with appropriate resources  Description: INTERVENTIONS:  - Identify barriers to discharge w/patient and caregiver  - Arrange for needed discharge resources and transportation as appropriate  - Identify discharge learning needs (meds, wound care, etc )  - Arrange for interpretive services to assist at discharge as needed  - Refer to Case Management Department for coordinating discharge planning if the patient needs post-hospital services based on physician/advanced practitioner order or complex needs related to functional status, cognitive ability, or social support system  Outcome: Progressing     Problem: Knowledge Deficit  Goal: Patient/family/caregiver demonstrates understanding of disease process, treatment plan, medications, and discharge instructions  Description: Complete learning assessment and assess knowledge base    Interventions:  - Provide teaching at level of understanding  - Provide teaching via preferred learning methods  Outcome: Progressing     Problem: Prexisting or High Potential for Compromised Skin Integrity  Goal: Skin integrity is maintained or improved  Description: INTERVENTIONS:  - Identify patients at risk for skin breakdown  - Assess and monitor skin integrity  - Assess and monitor nutrition and hydration status  - Monitor labs   - Assess for incontinence   - Turn and reposition patient  - Assist with mobility/ambulation  - Relieve pressure over bony prominences  - Avoid friction and shearing  - Provide appropriate hygiene as needed including keeping skin clean and dry  - Evaluate need for skin moisturizer/barrier cream  - Collaborate with interdisciplinary team   - Patient/family teaching  - Consider wound care consult   Outcome: Progressing     Problem: MOBILITY - ADULT  Goal: Maintain or return to baseline ADL function  Description: INTERVENTIONS:  -  Assess patient's ability to carry out ADLs; assess patient's baseline for ADL function and identify physical deficits which impact ability to perform ADLs (bathing, care of mouth/teeth, toileting, grooming, dressing, etc )  - Assess/evaluate cause of self-care deficits   - Assess range of motion  - Assess patient's mobility; develop plan if impaired  - Assess patient's need for assistive devices and provide as appropriate  - Encourage maximum independence but intervene and supervise when necessary  - Involve family in performance of ADLs  - Assess for home care needs following discharge   - Consider OT consult to assist with ADL evaluation and planning for discharge  - Provide patient education as appropriate  Outcome: Progressing  Goal: Maintains/Returns to pre admission functional level  Description: INTERVENTIONS:  - Perform BMAT or MOVE assessment daily    - Set and communicate daily mobility goal to care team and patient/family/caregiver  - Collaborate with rehabilitation services on mobility goals if consulted  - Perform Range of Motion 3 times a day  - Reposition patient every 2 hours    - Dangle patient 3 times a day  - Stand patient 3 times a day  - Ambulate patient 3 times a day  - Out of bed to chair 3 times a day   - Out of bed for meals 3 times a day  - Out of bed for toileting  - Record patient progress and toleration of activity level   Outcome: Progressing

## 2023-02-02 NOTE — ASSESSMENT & PLAN NOTE
· Drop in EF from 65 to 25-30% in the setting of type I NSTEMI  · Entresto added today  · Home Carvedilol continued  · Was volume overloaded on presentation to [facility] on 1/29/23  · Volume maintenance through HD  · Home Torsemide dose increased from 50 to 100 mg  · Lifevest before discharge

## 2023-02-02 NOTE — CASE MANAGEMENT
Case Management Discharge Planning Note    Patient name Leslie Lindsey  [facility]N [id number]  : 1960 Date 2023       Current Admission Date: 2023  Current Admission Diagnosis:Elevated troponin with chest pain   Patient Active Problem List    Diagnosis Date Noted   • Hyponatremia 2023   • Pre-syncope 2023   • SOB (shortness of breath) 10/21/2022   • Left arm swelling 10/21/2022   • Coronary artery disease involving native coronary artery of native heart without angina pectoris 2022   • ESRD (end stage renal disease) ([facility] ) 06/10/2022   • Electrolyte abnormality 2022   • Chest pain 2022   • Generalized weakness 2022   • Elevated troponin with chest pain 2022   • Primary hypertension 2022   • Penetrating foot wound, left, initial encounter 2022   • Emotional lability 2022   • Enteritis 2022   • Leukocytosis 2022   • History of 2019 novel coronavirus disease (COVID-19) 2020   • ESRD on dialysis ([facility] ) 2020   • Anemia 10/05/2020   • S/P arteriovenous (AV) fistula creation 2020   • Pre-kidney transplant, listed 2020   • Urge incontinence 2019   • Overactive bladder 2019   • Anxiety associated with depression 2019   • symptomatic hypoglycemia 10/28/2018   • History of stroke 10/04/2018   • Abnormal EEG 2018   • Other constipation 2018   • GERD (gastroesophageal reflux disease) 2018   • Diabetic macular edema ([facility] ) 2018   • Behavior concern in adult 2018   • Elevated alkaline phosphatase level 2018   • Nephrotic range proteinuria 2018   • Type 2 diabetes mellitus with chronic kidney disease on chronic dialysis, with long-term current use of insulin ([facility] ) 2016   • Dizziness 2016   • Mixed hyperlipidemia 2016   • Diabetic polyneuropathy associated with type 2 diabetes mellitus ([facility] ) 2016      LOS (days): 1  Geometric Mean LOS (GMLOS) (days): 3 80  Days to GMLOS:2 5     OBJECTIVE:  Risk of Unplanned Readmission Score: 54 26         Current admission status: Inpatient   Preferred Pharmacy:   [id number]HETT TODD - [address] [place] [address]  Phone: [phone] Fax: [phone]    Primary Care Provider: Eitan Hale DO    Primary Insurance: MEDICARE  Secondary Insurance: [place] [employer]    DISCHARGE DETAILS:                 Treatment Team Recommendation: Short Term Rehab  Discharge Destination Plan[de-identified] Short Term Rehab  Transport at Discharge : Family         Additional Comments: Met with patient and family at bedside to discuss current recommendation for STR  Son declined and stated that patient does not need short-term rehab; discussed the possibility of home health/PT, however son prefers that patient attend outpatient cardiac rehab  Discussed PT/OT recommendation that patient use a rolling walker, CM offered to order, son again declined stating that patient does not require a walker  CM reviewed that this recommendation by PT/OT is made for safety reasons, son continued to decline  Discussed that Life Vest order has been placed, awaiting approval of order and scheduling of fitting, hopefully by tomorrow  CM to follow

## 2023-02-02 NOTE — ASSESSMENT & PLAN NOTE
Elevated troponins present on arrival at [place] [facility]: [phone] peaking at 03,639, in a patient with a background of diffuse heart disease including multiple PCIs non compliant with DAPT, LAD thrombus s/p thrombectomy , balloon angioplasty LAD , moderate AS   • EKG: NSR 76 with RBBB , dynamic changes anterolateral leads   • Possibly  nonischemic MI in the setting flash pulmonary edema however taking in consideration patient's extensive cardiac h/o consider  Further eval ( cardiac cath)  • Echocardiogram with decreased EF now 25-30%  • Continue Heparin Drip  • Cardiology consulted for cardiac catheterization

## 2023-02-02 NOTE — ASSESSMENT & PLAN NOTE
· Presented to [facility] on 1/29/23 with shortness of breath and chest pain  · Volume overloaded on presentation  · Trop - 4037 -- 77,384 -- 8884  · Echo - EF 25 - 30% (from 65% on 4/20/22)  · H/o multivessel CAD with multivessel PCI  · Transferred to [facility] for complex cath  · Cath (2/1/23) - Multivessel CAD with marked progression since the prior angiographic study in May 2022  Stents placed in the mid LAD exhibited significant discrete and-stent restenosis  The first OM branch, stented in 5/22, has become occluded  The distal RCA beyond the PDA has akso become occluded  LAD in--stent restenosis was interrogated by IVUS imaging and successfully treated with placement of a 4 0x13mm Orsiro ADE  An attempt was made to wire the occluded OM branch, but the wire could not be advanced beyond the stent in mid vessel     · Ct ASA, Effient, statin, BB

## 2023-02-02 NOTE — PLAN OF CARE
Problem: PHYSICAL THERAPY ADULT  Goal: Performs mobility at highest level of function for planned discharge setting  See evaluation for individualized goals  Description: Treatment/Interventions: Functional transfer training, LE strengthening/ROM, Therapeutic exercise, Elevations, Endurance training, Gait training, Equipment eval/education, Bed mobility, OT  Equipment Recommended: Walker       See flowsheet documentation for full assessment, interventions and recommendations  Note: Prognosis: Fair  Problem List: Decreased strength, Decreased endurance, Impaired balance, Decreased mobility, Decreased cognition  Assessment: Pt is a 57 yo male admitted to [place] [address] on 2/1/2023 s/p AMS  Pt is s/p cardiac cath  DX: NSTEMI, MVCAD, acute on chronic HF, cardiomyopathy, ESRD, HLD, DM, hx of L MCA CVA, hyponatremia, hypoxia, CAD, diabetic polyneuropathy  Two patient identifiers were used to confirm  Pt lives in a bi level home with 2 RAFAT  Pt then has 2 steps either down or up  Pt lives with his spouse  Pt has supportive family who live next door and are able to assist  Pt was I for ADL's and mobility prior  Pt required A with IADL's prior  Pt did not use any DME prior  Pt's impairments include reduced mobility, poor endurance, gait abnormalities, high risk of falling, reduced balance  These impairments limit the ability of the patient to perform mobility without increased assistance, return to PLOF and participate in everyday life activities  Pt would benefit from continued skilled therapy while in the hospital to improve overall mobility and work towards a safe d/c  Recommend discharge to rehab  At the end of the session the patient was left in seated position with call bell and phone within reach  Pt's family present during session and able to assist with PLOF    Barriers to Discharge: Inaccessible home environment, Decreased caregiver support     PT Discharge Recommendation: Post acute rehabilitation services    See flowsheet documentation for full assessment

## 2023-02-02 NOTE — DISCHARGE INSTRUCTIONS
1  Please see the post cardiac catheterization/ angioseal closure device instructions and stent booklet  No heavy lifting, greater than 10 lbs  or strenuous  activity for 48 hrs  See the post cardiac catheterization/ angioseal closure device instructions  2 Remove band aid tomorrow  Shower and wash area- wrist gently with soap and water- beginning tomorrow  Rinse and pat dry  Apply new water seal band aid  Repeat this process for 5 days  No powders, creams lotions or antibiotic ointments  for 5 days  No tub baths, hot tubs or swimming for 5 days  3 No driving for 2 days    4  Do not stop aspirin or effient (prasugrel) for any reason without a cardiologist’s consent, or the stent could block up and cause a heart attack

## 2023-02-03 ENCOUNTER — APPOINTMENT (INPATIENT)
Dept: DIALYSIS | Facility: HOSPITAL | Age: 63
End: 2023-02-03

## 2023-02-03 LAB
ANION GAP SERPL CALCULATED.3IONS-SCNC: 16 MMOL/L (ref 4–13)
ATRIAL RATE: 72 BPM
ATRIAL RATE: 72 BPM
ATRIAL RATE: 73 BPM
BACTERIA BLD CULT: NORMAL
BACTERIA BLD CULT: NORMAL
BUN SERPL-MCNC: 90 MG/DL (ref 5–25)
CALCIUM SERPL-MCNC: 9 MG/DL (ref 8.3–10.1)
CHLORIDE SERPL-SCNC: 97 MMOL/L (ref 96–108)
CO2 SERPL-SCNC: 23 MMOL/L (ref 21–32)
CREAT SERPL-MCNC: 10.6 MG/DL (ref 0.6–1.3)
GFR SERPL CREATININE-BSD FRML MDRD: 4 ML/MIN/1.73SQ M
GLUCOSE SERPL-MCNC: 108 MG/DL (ref 65–140)
GLUCOSE SERPL-MCNC: 126 MG/DL (ref 65–140)
GLUCOSE SERPL-MCNC: 126 MG/DL (ref 65–140)
GLUCOSE SERPL-MCNC: 172 MG/DL (ref 65–140)
GLUCOSE SERPL-MCNC: 99 MG/DL (ref 65–140)
MRSA NOSE QL CULT: NORMAL
P AXIS: 61 DEGREES
P AXIS: 64 DEGREES
P AXIS: 65 DEGREES
POTASSIUM SERPL-SCNC: 4.1 MMOL/L (ref 3.5–5.3)
PR INTERVAL: 158 MS
PR INTERVAL: 158 MS
PR INTERVAL: 160 MS
QRS AXIS: -11 DEGREES
QRS AXIS: -12 DEGREES
QRS AXIS: -21 DEGREES
QRSD INTERVAL: 154 MS
QRSD INTERVAL: 154 MS
QRSD INTERVAL: 156 MS
QT INTERVAL: 484 MS
QT INTERVAL: 486 MS
QT INTERVAL: 496 MS
QTC INTERVAL: 529 MS
QTC INTERVAL: 535 MS
QTC INTERVAL: 543 MS
SODIUM SERPL-SCNC: 136 MMOL/L (ref 135–147)
T WAVE AXIS: 40 DEGREES
T WAVE AXIS: 57 DEGREES
T WAVE AXIS: 79 DEGREES
VENTRICULAR RATE: 72 BPM
VENTRICULAR RATE: 72 BPM
VENTRICULAR RATE: 73 BPM

## 2023-02-03 PROCEDURE — 5A1D70Z PERFORMANCE OF URINARY FILTRATION, INTERMITTENT, LESS THAN 6 HOURS PER DAY: ICD-10-PCS | Performed by: INTERNAL MEDICINE

## 2023-02-03 RX ORDER — METOPROLOL SUCCINATE 25 MG/1
25 TABLET, EXTENDED RELEASE ORAL DAILY
Status: DISCONTINUED | OUTPATIENT
Start: 2023-02-03 | End: 2023-02-04 | Stop reason: HOSPADM

## 2023-02-03 RX ORDER — METOCLOPRAMIDE HYDROCHLORIDE 5 MG/ML
5 INJECTION INTRAMUSCULAR; INTRAVENOUS ONCE
Status: DISCONTINUED | OUTPATIENT
Start: 2023-02-03 | End: 2023-02-03

## 2023-02-03 RX ORDER — CARVEDILOL 12.5 MG/1
12.5 TABLET ORAL 2 TIMES DAILY WITH MEALS
Status: DISCONTINUED | OUTPATIENT
Start: 2023-02-03 | End: 2023-02-03

## 2023-02-03 RX ORDER — INSULIN GLARGINE 100 [IU]/ML
6 INJECTION, SOLUTION SUBCUTANEOUS EVERY MORNING
Status: DISCONTINUED | OUTPATIENT
Start: 2023-02-04 | End: 2023-02-04

## 2023-02-03 RX ORDER — METOCLOPRAMIDE HYDROCHLORIDE 5 MG/ML
5 INJECTION INTRAMUSCULAR; INTRAVENOUS ONCE AS NEEDED
Status: DISCONTINUED | OUTPATIENT
Start: 2023-02-03 | End: 2023-02-04 | Stop reason: HOSPADM

## 2023-02-03 RX ADMIN — DIVALPROEX SODIUM 250 MG: 125 CAPSULE, COATED PELLETS ORAL at 10:33

## 2023-02-03 RX ADMIN — HEPARIN SODIUM 5000 UNITS: 5000 INJECTION INTRAVENOUS; SUBCUTANEOUS at 05:49

## 2023-02-03 RX ADMIN — HEPARIN SODIUM 5000 UNITS: 5000 INJECTION INTRAVENOUS; SUBCUTANEOUS at 21:16

## 2023-02-03 RX ADMIN — CALCIUM ACETATE 667 MG: 667 CAPSULE ORAL at 16:08

## 2023-02-03 RX ADMIN — HEPARIN SODIUM 5000 UNITS: 5000 INJECTION INTRAVENOUS; SUBCUTANEOUS at 13:55

## 2023-02-03 RX ADMIN — METOPROLOL SUCCINATE 25 MG: 25 TABLET, FILM COATED, EXTENDED RELEASE ORAL at 12:44

## 2023-02-03 RX ADMIN — DIVALPROEX SODIUM 250 MG: 125 CAPSULE, COATED PELLETS ORAL at 21:16

## 2023-02-03 RX ADMIN — PRASUGREL 5 MG: 10 TABLET, FILM COATED ORAL at 11:46

## 2023-02-03 RX ADMIN — ATORVASTATIN CALCIUM 40 MG: 40 TABLET, FILM COATED ORAL at 16:08

## 2023-02-03 RX ADMIN — ASPIRIN 81 MG: 81 TABLET, COATED ORAL at 10:34

## 2023-02-03 RX ADMIN — CHLORHEXIDINE GLUCONATE 0.12% ORAL RINSE 15 ML: 1.2 LIQUID ORAL at 10:33

## 2023-02-03 RX ADMIN — CALCIUM ACETATE 667 MG: 667 CAPSULE ORAL at 11:34

## 2023-02-03 RX ADMIN — CHLORHEXIDINE GLUCONATE 0.12% ORAL RINSE 15 ML: 1.2 LIQUID ORAL at 21:16

## 2023-02-03 NOTE — ASSESSMENT & PLAN NOTE
Lab Results   Component Value Date    EGFR 4 02/03/2023    EGFR 4 02/02/2023    EGFR 4 02/01/2023    CREATININE 10 60 (H) 02/03/2023    CREATININE 11 90 (H) 02/02/2023    CREATININE 9 94 (H) 02/01/2023   • HD on MWF - managed per nephrology - input appreciated

## 2023-02-03 NOTE — ASSESSMENT & PLAN NOTE
· Carvedilol changed to Toprol XL 25 mg daily as BP on lower side  · Entresto/Torsemide held  · Meds with hold parameters   · Monitor

## 2023-02-03 NOTE — ASSESSMENT & PLAN NOTE
· Was begun on Depakote sprinkles 125 mg BID when seen by psychiatry at [facility] on 1/6/23  · Current home dose 250 mg BID - continue  · Outpatient follow-up

## 2023-02-03 NOTE — PROGRESS NOTES
General Cardiology   Progress Note -  Team One   Lowella Curling 58 y o  male MRN: [id number]  Unit/Bed#: [facility] 531-01 Encounter: [id number]    Assessment     1  NSTEMI s/p ADE to in-stent restenosis of mid LAD   [facility] with chest pain and SOB, volume overload  HS troponin 4037-->7138-->66827-->16,894-->8884  ECG- NSR with RBBB, inferolateral T wave abnormality  TTE showed drop in LV function to 25-30% with regional variation  [facility] 2/1/23- marked progression of CAD: Stent placed to 80% in-stent restenosis of mid LAD  Prior stent to OM1 occluded but wire could not be advanced so no intervention performed  Distal RCA now occluded beyond the PDA  See cath report for full details    DAPT with ASA and Effient     2  MVCAD with multiple prior interventions  See progress note from 2/1 for details of prior interventions  Has had marked progression of CAD since May 2022  On ASA, Effient, carvedilol, and statin      3  Acute on chronic HFrEF  Home diuretic: torsemide 100 mg daily  Previous dry weight: 225 lb  Down to 210 lb with HD this hospital stay  Weaned to room air, euvolemic on exam      4  New ischemic cardiomyopathy, LVEF 25-30%  TTE 1/30/23: LVEF 25-30% with regional variation, moderate AS, moderate MR  GDMT- carvedilol 12 5 mg BID and Entresto 24-26 mg BID started yesterday however too hypotensive and this will need to be discontinued       5  Moderate AS- unchanged from echo last year     6  ESRD on HD- nephrology following     7  HLD- TC 76, , HDL 30, LDL 21  On atorvastatin 40 mg daily     8  HTN- 24 hour average /52       9  Type 2 DM- A1c 5 5%     10  Hx of L MCA CVA     Plan  1  Continue DAPT with ASA and Effient  2  Hypotension with associated CP is a concern  D/c carvedilol and Entresto  Start Metoprolol succinate 25 mg daily  3  Add Entresto in office if BP improves, cost was checked and would be $10/month  4  Volume status stable, managed by nephrology     5  Continue high intensity statin   6  LifeVest at discharge given ICM with EF 25-30%  7   Family reports that he ambulates independently at home but PT/OT recommending rehab  Family agreeable to home PT  Cardiac rehab ordered for discharge; may need to delay until completes home PT  8  Stable for d/c planning later today once LifeVest fitted    Subjective  No further chest pain  Wants to go home  Review of Systems   Constitutional: Negative for chills, malaise/fatigue and weight gain  Cardiovascular: Negative for chest pain, dyspnea on exertion, leg swelling, orthopnea, palpitations and syncope  Respiratory: Negative for cough, shortness of breath, sleep disturbances due to breathing and sputum production  Gastrointestinal: Negative for bloating and nausea  Neurological: Negative for dizziness, light-headedness and weakness  Psychiatric/Behavioral: Negative for altered mental status  All other systems reviewed and are negative  Objective:   Vitals: Blood pressure (!) 103/36, pulse 71, temperature 97 5 °F (36 4 °C), temperature source Oral, resp  rate 16, weight 97 4 kg (214 lb 11 7 oz), SpO2 95 %  , Body mass index is 31 71 kg/m²  ,     Systolic (22VJG), CRJ:778 , Min:93 , HRH:805     Diastolic (81HYD), RAB:79, Min:36, Max:95    Intake/Output Summary (Last 24 hours) at 2/3/2023 1006  Last data filed at 2/3/2023 0950  Gross per 24 hour   Intake 500 ml   Output 316 ml   Net 184 ml     Wt Readings from Last 3 Encounters:   02/03/23 97 4 kg (214 lb 11 7 oz)   02/01/23 95 4 kg (210 lb 5 1 oz)   01/26/23 100 kg (221 lb)     Telemetry Review: NSR    Physical Exam  Constitutional:       General: He is not in acute distress  Neck:      Vascular: No hepatojugular reflux or JVD  Cardiovascular:      Rate and Rhythm: Normal rate and regular rhythm  Heart sounds: Normal heart sounds  No murmur heard  No friction rub  No gallop  Pulmonary:      Effort: Pulmonary effort is normal  No respiratory distress        Breath sounds: No rales  Abdominal:      General: Bowel sounds are normal  There is no distension  Palpations: Abdomen is soft  Tenderness: There is no abdominal tenderness  Musculoskeletal:         General: No tenderness  Normal range of motion  Cervical back: Neck supple  Right lower leg: No edema  Left lower leg: No edema  Skin:     General: Skin is warm and dry  Findings: No erythema  Comments: Right radial cath site: MIRNA  No erythema, ecchymosis, or hematoma  Neurological:      Mental Status: He is alert and oriented to person, place, and time       LABORATORY RESULTS      CBC with diff:   Results from last 7 days   Lab Units 02/02/23  0615 02/01/23  0729 01/31/23  0521 01/30/23  1505 01/30/23  0458 01/29/23  1415 01/29/23  0821   WBC Thousand/uL 6 22 8 27 8 33  --  11 00*  --  11 83*   HEMOGLOBIN g/dL 10 1* 10 7* 9 7*  --  9 6*  --  10 9*   I STAT HEMOGLOBIN g/dl  --   --   --  9 9*  --   --   --    HEMATOCRIT % 31 5* 33 7* 29 9*  --  30 2*  --  35 6*   HEMATOCRIT, ISTAT %  --   --   --  29*  --   --   --    MCV fL 94 95 95  --  97  --  98   PLATELETS Thousands/uL 168 180 160  --  148* 172 181   MCH pg 30 1 30 1 30 9  --  30 8  --  29 9   MCHC g/dL 32 1 31 8 32 4  --  31 8  --  30 6*   RDW % 15 1 15 3* 15 6*  --  15 6*  --  15 2*   MPV fL 10 1 10 3 10 2  --  10 6 9 8 10 3   NRBC AUTO /100 WBCs  --  0  --   --  0  --  0     CMP:  Results from last 7 days   Lab Units 02/03/23  0545 02/02/23  0615 02/01/23  0729 01/31/23  0521 01/30/23  1505 01/30/23  0458 01/29/23  0821   POTASSIUM mmol/L 4 1 4 6 4 4 4 3  --  5 2 3 5   CHLORIDE mmol/L 97 93* 89* 93*  --  98 114*   CO2 mmol/L 23 22 25 28  --  25 20*   CO2, I-STAT mmol/L  --   --   --   --  25  --   --    BUN mg/dL 90* 112* 89* 52*  --  68* 36*   CREATININE mg/dL 10 60* 11 90* 9 94* 7 29*  --  9 62* 5 40*   GLUCOSE, ISTAT mg/dl  --   --   --   --  142*  --   --    CALCIUM mg/dL 9 0 8 4 8 8 8 9  --  8 8 6 0*   AST U/L  --   -- --  21  --  27 13   ALT U/L  --   --   --  13  --  13 7   ALK PHOS U/L  --   --   --  56  --  60 51   EGFR ml/min/1 73sq m 4 4 4 7  --  5 10     BMP:  Results from last 7 days   Lab Units 02/03/23  0545 02/02/23  0615 02/01/23  0729 01/31/23  0521 01/30/23  1505 01/30/23  0458 01/29/23  0821   POTASSIUM mmol/L 4 1 4 6 4 4 4 3  --  5 2 3 5   CHLORIDE mmol/L 97 93* 89* 93*  --  98 114*   CO2 mmol/L 23 22 25 28  --  25 20*   CO2, I-STAT mmol/L  --   --   --   --  25  --   --    BUN mg/dL 90* 112* 89* 52*  --  68* 36*   CREATININE mg/dL 10 60* 11 90* 9 94* 7 29*  --  9 62* 5 40*   GLUCOSE, ISTAT mg/dl  --   --   --   --  142*  --   --    CALCIUM mg/dL 9 0 8 4 8 8 8 9  --  8 8 6 0*     Lab Results   Component Value Date    CREATININE 10 60 (H) 02/03/2023    CREATININE 11 90 (H) 02/02/2023    CREATININE 9 94 (H) 02/01/2023     Lab Results   Component Value Date    NTBNP 1,859 (H) 01/02/2022    NTBNP 1,259 (H) 12/30/2020    NTBNP 1,579 (H) 12/26/2020      Results from last 7 days   Lab Units 02/02/23  0615 02/01/23  0729 01/31/23  0521 01/29/23  0821   MAGNESIUM mg/dL 3 6* 3 2* 3 0* 1 5*      Results from last 7 days   Lab Units 01/29/23  0821   TSH 3RD GENERATON uIU/mL 1 966     Results from last 7 days   Lab Units 01/29/23  0821   INR  1 03       Lipid Profile:   Lab Results   Component Value Date    CHOL 203 (H) 08/10/2016     Lab Results   Component Value Date    HDL 30 (L) 01/30/2023    HDL 33 (L) 07/25/2022    HDL 31 (L) 06/20/2022     Lab Results   Component Value Date    LDLCALC 21 01/30/2023    LDLCALC 19 07/25/2022    LDLCALC 25 06/20/2022     Lab Results   Component Value Date    TRIG 123 01/30/2023    TRIG 151 (H) 07/25/2022    TRIG 182 (H) 06/20/2022       Cardiac testing:   Results for orders placed during the hospital encounter of 10/05/20    Echo complete with contrast if indicated    Narrative  [address] [place] [address], [address] [place] [address]  [phone]    Transthoracic Echocardiogram  2D, M-mode, Doppler, and Color Doppler    Study date:  06-Oct-2020    Patient: Gisela Cardona  MR number: [id number]  Account number: [de-identified]  : 1960  Age: 61 years  Gender: Male  Status: Inpatient  Location: Bedside  Height: 70 in  Weight: 239 6 lb  BP: 165/ 80 mmHg    Indications: Shortness of breath  Diagnoses: R06 02 - Shortness of breath    Sonographer:  Vi Walker RDCS  Primary Physician:  Guille Grigsby DO  Referring Physician:  Pierre Sheppard MD  Group:  [facility]  Interpreting Physician:  Irene Carmen MD    SUMMARY    LEFT VENTRICLE:  Systolic function was normal by visual assessment  Ejection fraction was estimated to be 60 %  There were no regional wall motion abnormalities  Wall thickness was moderately increased  Features were consistent with a pseudonormal left ventricular filling pattern, with concomitant abnormal relaxation and increased filling pressure (grade 2 diastolic dysfunction)  LEFT ATRIUM:  The atrium was mildly dilated  RIGHT ATRIUM:  The atrium was mildly dilated  MITRAL VALVE:  There was moderate annular calcification  There was mild regurgitation  AORTIC VALVE:  The valve was trileaflet  Leaflets exhibited normal thickness, moderate calcification, and moderately reduced cuspal separation  Transaortic velocity was increased due to valvular stenosis  There was mild to moderate stenosis  There was mild regurgitation  TRICUSPID VALVE:  There was trace regurgitation  PULMONIC VALVE:  There was mild regurgitation  HISTORY: PRIOR HISTORY: DM2  CKD4  Hypertension  CVA  GERD  Dementia  PROCEDURE: The procedure was performed at the bedside  This was a routine study  The transthoracic approach was used  The study included complete 2D imaging, M-mode, complete spectral Doppler, and color Doppler  The heart rate was 98 bpm,  at the start of the study  Image quality was adequate      LEFT VENTRICLE: Size was normal  Systolic function was normal by visual assessment  Ejection fraction was estimated to be 60 %  There were no regional wall motion abnormalities  Wall thickness was moderately increased  DOPPLER: The ratio  of early ventricular filling to atrial contraction velocities was within the normal range  Features were consistent with a pseudonormal left ventricular filling pattern, with concomitant abnormal relaxation and increased filling pressure  (grade 2 diastolic dysfunction)  RIGHT VENTRICLE: The size was normal  Systolic function was normal  DOPPLER: Systolic pressure was not estimated  LEFT ATRIUM: The atrium was mildly dilated  RIGHT ATRIUM: The atrium was mildly dilated  MITRAL VALVE: There was moderate annular calcification  Valve structure was normal  There was normal leaflet separation  DOPPLER: The transmitral velocity was within the normal range  There was no evidence for stenosis  There was mild  regurgitation  AORTIC VALVE: The valve was trileaflet  Leaflets exhibited normal thickness, moderate calcification, and moderately reduced cuspal separation  DOPPLER: Transaortic velocity was increased due to valvular stenosis  There was mild to moderate  stenosis  There was mild regurgitation  TRICUSPID VALVE: The valve structure was normal  There was normal leaflet separation  DOPPLER: The transtricuspid velocity was within the normal range  There was no evidence for stenosis  There was trace regurgitation  PULMONIC VALVE: Leaflets exhibited normal thickness, no calcification, and normal cuspal separation  DOPPLER: The transpulmonic velocity was within the normal range  There was mild regurgitation  PERICARDIUM: There was no pericardial effusion  AORTA: The root exhibited normal size  SYSTEMIC VEINS: IVC: The inferior vena cava was normal in size and course   Respirophasic changes were normal     SYSTEM MEASUREMENT TABLES    2D  %FS: 36 09 %  Ao Diam: 3 8 cm  EDV(Teich): 182 77 ml  EF(Teich): 64 8 %  ESV(Teich): 64 33 ml  IVSd: 1 57 cm  LA Area: 24 31 cm2  LA Diam: 4 63 cm  LVEDV MOD A4C: 192 34 ml  LVEF MOD A4C: 65 32 %  LVESV MOD A4C: 66 7 ml  LVIDd: 6 04 cm  LVIDs: 3 86 cm  LVLd A4C: 9 02 cm  LVLs A4C: 7 15 cm  LVOT Diam: 2 21 cm  LVPWd: 1 61 cm  RA Area: 17 56 cm2  RVIDd: 4 cm  SV MOD A4C: 125 64 ml  SV(Teich): 118 44 ml    CW  AV Env  Ti: 330 16 ms  AV MaxP 64 mmHg  AV VTI: 57 9 cm  AV Vmax: 2 58 m/s  AV Vmean: 1 76 m/s  AV meanP 38 mmHg  TR MaxP 37 mmHg  TR Vmax: 2 71 m/s    MM  TAPSE: 1 84 cm    PW  FATUMA (VTI): 1 27 cm2  FATUMA Vmax: 1 42 cm2  AVAI (VTI): 0 cm2/m2  AVAI Vmax: 0 cm2/m2  E' Sept: 0 07 m/s  E/E' Sept: 12 69  LVOT Env  Ti: 335 87 ms  LVOT VTI: 19 11 cm  LVOT Vmax: 0 95 m/s  LVOT Vmean: 0 57 m/s  LVOT maxPG: 3 63 mmHg  LVOT meanP 61 mmHg  LVSI Dopp: 32 42 ml/m2  LVSV Dopp: 73 27 ml  MV A Carlin: 0 64 m/s  MV Dec Stoddard: 4 7 m/s2  MV DecT: 189 67 ms  MV E Carlin: 0 89 m/s  MV E/A Ratio: 1 38  MV PHT: 55 01 ms  MVA By PHT: 4 cm2    [facility] Accredited Echocardiography Laboratory    Prepared and electronically signed by    Donato Zayas MD  Signed 06-Oct-2020 16:31:49    Meds/Allergies   all current active meds have been reviewed and current meds:   Current Facility-Administered Medications   Medication Dose Route Frequency   • acetaminophen (TYLENOL) tablet 975 mg  975 mg Oral Q8H PRN   • aspirin (ECOTRIN LOW STRENGTH) EC tablet 81 mg  81 mg Oral Daily   • atorvastatin (LIPITOR) tablet 40 mg  40 mg Oral Daily With Dinner   • calcium acetate (PHOSLO) capsule 667 mg  667 mg Oral TID With Meals   • carvedilol (COREG) tablet 12 5 mg  12 5 mg Oral BID With Meals   • chlorhexidine (PERIDEX) 0 12 % oral rinse 15 mL  15 mL Mouth/Throat Q12H [facility]   • divalproex sodium (DEPAKOTE SPRINKLE) capsule 250 mg  250 mg Oral Q12H [facility]   • guaiFENesin (ROBITUSSIN) oral liquid 200 mg  200 mg Oral Q4H PRN   • heparin (porcine) subcutaneous injection 5,000 Units  5,000 Units Subcutaneous [facility] • insulin lispro (HumaLOG) 100 units/mL subcutaneous injection 1-5 Units  1-5 Units Subcutaneous 4x Daily (AC & HS)   • melatonin tablet 3 mg  3 mg Oral HS   • nitroglycerin (NITROSTAT) SL tablet 0 4 mg  0 4 mg Sublingual Q5 Min PRN   • prasugrel (EFFIENT) tablet 5 mg  5 mg Oral Daily   • sacubitril-valsartan (ENTRESTO) 24-26 MG per tablet 1 tablet  1 tablet Oral BID   • torsemide (DEMADEX) tablet 100 mg  100 mg Oral Daily     Medications Prior to Admission   Medication   • aspirin (ECOTRIN LOW STRENGTH) 81 mg EC tablet   • atorvastatin (LIPITOR) 40 mg tablet   • Blood Glucose Monitoring Suppl (True Metrix Meter) w/Device KIT   • Blood Pressure Monitoring (BLOOD PRESSURE CUFF) MISC   • calcium acetate (CALPHRON) 667 mg   • carvedilol (COREG) 12 5 mg tablet   • Cholecalciferol (Vitamin D3) 1 25 MG (17587 UT) CAPS   • divalproex sodium (DEPAKOTE SPRINKLE) 125 MG capsule   • glucose blood (True Metrix Blood Glucose Test) test strip   • insulin glargine (Lantus) 100 units/mL subcutaneous injection   • insulin lispro (HumaLOG KwikPen) 100 units/mL injection pen   • Insulin Pen Needle (BD Pen Needle Adina U/F) 32G X 4 MM MISC   • Insulin Syringe-Needle U-100 (B-D INS SYRINGE 0 5CC/30GX1/2") 30G X 1/2" 0 5 ML MISC   • Lancets Ultra Thin 30G MISC   • Methoxy PEG-Epoetin Beta 30 MCG/0 3ML SOSY   • ONETOUCH DELICA LANCETS 26L MISC   • prasugrel (EFFIENT) tablet   • torsemide (DEMADEX) 100 mg tablet     Counseling / Coordination of Care  Total floor / unit time spent today 20 minutes  Greater than 50% of total time was spent with the patient and / or family counseling and / or coordination of care  ** Please Note: Dragon 360 Dictation voice to text software may have been used in the creation of this document   **

## 2023-02-03 NOTE — PROGRESS NOTES
NEPHROLOGY PROGRESS NOTE   Alexandrea Sanchez 58 y o  male MRN: [id number]  Unit/Bed#: [facility] 531-01 Encounter: [id number]       Hemodialysis procedure note:    Patient was seen on hemodialysis today 856 AM  Hemodynamics are stable  Orders were reviewed  138 NA, 2K, 2 5 Ca, 35 Bicarb with F180 Dialyzer  Blood flow rate of 400 mL/min  Dialysis flow rate of 500 mL/min  3-hour treatment scheduled for today  Attempted fluid removal however blood pressure did drop, unable to get a standing weight today we will run even    ASSESSMENT & PLAN    61-year-old male with a history of end-stage kidney disease on hemodialysis presented with shortness of breath history of coronary artery disease     1  End-stage kidney disease on hemodialysis Monday Wednesday Friday [address]  ? Monday Wednesday Friday and with a left AV fistula that is in use today  ? Electrolytes and acid-base status are stable  ? He can go back on MWF treatment schedule  ? We were unable to get a weight today but lungs are clear, bp is lower, appetite has been poor     2  Type I NSTEMI status post drug-eluting stent, ischemic cardiomyopathy  ? Carvedilol 12 5 mg 2 times daily  ? Entresto 24/26 mg two times daily  ? Torsemide 100 mg daily  ? Prasugrel 5 mg daily  ? Cardiology following  ? Statin therapy  ? Aspirin  ? BP on lower side will discuss with cardiology possible backing off carvedilol     3  Anemia of chronic kidney disease  ? Current hemoglobins are stable post cardiac catheterization  ? Ongoing management as an outpatient  ? Last recorded hgn is 10 1     4  Hyponatremia  ? Sodium has now improved at 136     5  Volume overload  ? This has significantly improved he has no hypoxia  ? We will continue to challenge his dry weight on dialysis as blood pressure allows     6  CKD bone mineral disease  ? Continue calcium acetate for hyperphosphatemia  ? His phos remains elevated     7  History of a CVA  ? This is stable  ?  Has some baseline anxiety    DISCUSSION:    I updated his son who was outside of the HD room  Will discuss with cardiology  Running even  If stable after HD ok for discharge with follow up in outpt HD unit    SUBJECTIVE:    Patient was seen today  No cp, sob, fevers, chills  No nausea, vomiting    12 point review of systems was otherwise negative besides what is mentioned above      Medications:    Current Facility-Administered Medications:   •  acetaminophen (TYLENOL) tablet 975 mg, 975 mg, Oral, Q8H PRN, Ayesha Cheng MD, 975 mg at 02/02/23 2146  •  aspirin (ECOTRIN LOW STRENGTH) EC tablet 81 mg, 81 mg, Oral, Daily, Ayesha Cheng MD, 81 mg at 02/02/23 0805  •  atorvastatin (LIPITOR) tablet 40 mg, 40 mg, Oral, Daily With Anita Landers MD, 40 mg at 02/02/23 1731  •  calcium acetate (PHOSLO) capsule 667 mg, 667 mg, Oral, TID With Meals, Ayesha Cheng MD, 667 mg at 02/02/23 1731  •  carvedilol (COREG) tablet 12 5 mg, 12 5 mg, Oral, BID With Meals, BRITTANY Frye  •  chlorhexidine (PERIDEX) 0 12 % oral rinse 15 mL, 15 mL, Mouth/Throat, Q12H [address], Ayesha Cheng MD, 15 mL at 02/02/23 2022  •  divalproex sodium (DEPAKOTE SPRINKLE) capsule 250 mg, 250 mg, Oral, Q12H [address], Ayesha Cheng MD, 250 mg at 02/02/23 2022  •  guaiFENesin (ROBITUSSIN) oral liquid 200 mg, 200 mg, Oral, Q4H PRN, Ayesha Cheng MD  •  heparin (porcine) subcutaneous injection 5,000 Units, 5,000 Units, Subcutaneous, Q8H [address], Ayesha Cheng MD, 5,000 Units at 02/03/23 0549  •  insulin lispro (HumaLOG) 100 units/mL subcutaneous injection 1-5 Units, 1-5 Units, Subcutaneous, 4x Daily (AC & HS) **AND** Fingerstick Glucose (POCT), , , 4x Daily AC and at bedtime, Ayesha Cheng MD  •  melatonin tablet 3 mg, 3 mg, Oral, HS, Ayesha Cheng MD  •  nitroglycerin (NITROSTAT) SL tablet 0 4 mg, 0 4 mg, Sublingual, Q5 Min PRN, Ayesha Cheng MD  •  prasugrel (EFFIENT) tablet 5 mg, 5 mg, Oral, Daily, Ayesha Cheng MD, 5 mg at 02/02/23 0805  •  sacubitril-valsartan (ENTRESTO) 24-26 MG per tablet 1 tablet, 1 tablet, Oral, BID, Andreina Quiroz MD, 1 tablet at 02/02/23 1734  •  torsemide [facility]) tablet 100 mg, 100 mg, Oral, Daily, Andreina Quiroz MD    OBJECTIVE:    Vitals:    02/03/23 8900 02/03/23 0804 02/03/23 0809 02/03/23 0830   BP: 100/52 123/95 (!) 98/40 (!) 100/38   BP Location: Right arm Right arm     Pulse: 71 67 65 69   Resp: 20 18 18 18   Temp: 98 4 °F (36 9 °C) 97 7 °F (36 5 °C)     TempSrc: Oral Oral     SpO2: 95%      Weight:            Temp:  [97 7 °F (36 5 °C)-99 8 °F (37 7 °C)] 97 7 °F (36 5 °C)  HR:  [65-82] 69  Resp:  [15-20] 18  BP: ()/(38-95) 100/38  SpO2:  [91 %-98 %] 95 %     Body mass index is 31 71 kg/m²  Weight (last 2 days)     Date/Time Weight    02/03/23 0548 97 4 (214 73)    02/02/23 0700 95 3 (210 1)    02/02/23 0600 95 (209 5)          I/O last 3 completed shifts: In: 2010 [P O :1210; I V :500; IV Piggyback:300]  Out: 1200 [Urine:200; Other:1000]    I/O this shift:  In: 300 [I V :200;  Other:100]  Out: -       Physical exam:    General: no acute distress, cooperative  Eyes: conjunctivae pink, anicteric sclerae  ENT: lips and mucous membranes moist, no exudates, normal external ears  Neck: ROM intact, no JVD  Chest: No respiratory distress, no accessory muscle use  CVS: normal rate, non pericardial friction rub  Abdomen: soft, non-tender, non-distended, normoactive bowel sounds  Extremities: no edema of both legs  Skin: no rash  Neuro: awake, alert, oriented, grossly intact  Psych:  Pleasant affect    Invasive Devices:      Lab Results:   Results from last 7 days   Lab Units 02/03/23  0545 02/02/23  0615 02/01/23  0729 01/31/23  0521 01/30/23  1505 01/30/23  0458 01/29/23  1415 01/29/23  0844 01/29/23  0821   WBC Thousand/uL  --  6 22 8 27 8 33  --  11 00*  --   --  11 83*   HEMOGLOBIN g/dL  --  10 1* 10 7* 9 7*  --  9 6*  --   --  10 9*   I STAT HEMOGLOBIN g/dl  --   --   --   --  9 9*  --   --   -- --    HEMATOCRIT %  --  31 5* 33 7* 29 9*  --  30 2*  --   --  35 6*   HEMATOCRIT, ISTAT %  --   --   --   --  29*  --   --   --   --    PLATELETS Thousands/uL  --  168 180 160  --  148* 172  --  181   POTASSIUM mmol/L 4 1 4 6 4 4 4 3  --  5 2  --   --  3 5   CHLORIDE mmol/L 97 93* 89* 93*  --  98  --   --  114*   CO2 mmol/L 23 22 25 28  --  25  --   --  20*   CO2, I-STAT mmol/L  --   --   --   --  25  --   --   --   --    BUN mg/dL 90* 112* 89* 52*  --  68*  --   --  36*   CREATININE mg/dL 10 60* 11 90* 9 94* 7 29*  --  9 62*  --   --  5 40*   CALCIUM mg/dL 9 0 8 4 8 8 8 9  --  8 8  --   --  6 0*   MAGNESIUM mg/dL  --  3 6* 3 2* 3 0*  --   --   --   --  1 5*   PHOSPHORUS mg/dL  --  6 7*  --  5 9*  --   --   --   --  3 0   ALK PHOS U/L  --   --   --  56  --  60  --   --  51   ALT U/L  --   --   --  13  --  13  --   --  7   AST U/L  --   --   --  21  --  27  --   --  13   GLUCOSE, ISTAT mg/dl  --   --   --   --  142*  --   --   --   --    BLOOD CULTURE   --   --   --   --   --   --   --  No Growth After 4 Days  No Growth After 4 Days  Portions of the record may have been created with voice recognition software  Occasional wrong word or "sound a like" substitutions may have occurred due to the inherent limitations of voice recognition software  Read the chart carefully and recognize, using context, where substitutions have occurred  If you have any questions, please contact the dictating provider

## 2023-02-03 NOTE — CASE MANAGEMENT
Case Management Note    Patient name Gumaro Older  Location [facility] 531/[facility] 815-30 MRN [id number]  : 1960 Date 2/3/2023       Current Admission Date: 2023  Current Admission Diagnosis:Type 1 non-ST elevation myocardial infarction (NSTEMI) s/p ADE to in-stent restenosis of mid LAD      LOS (days): 2  Geometric Mean LOS (GMLOS) (days): 3 80  Days to GMLOS:1 5     OBJECTIVE:  Risk of Unplanned Readmission Score: 56 11   Current admission status: Inpatient   Primary Insurance: MEDICARE  Secondary Insurance: [place] [employer]    DISCHARGE DETAILS:    Discharge planning discussed with[de-identified] Patient  Freedom of Choice: Yes  Were Treatment Team discharge recommendations reviewed with patient/caregiver?: Yes  Did patient/caregiver verbalize understanding of patient care needs?: Yes  Were patient/caregiver advised of the risks associated with not following Treatment Team discharge recommendations?: Yes    [address]         Is the patient interested in [facility] at discharge?: Yes  [address] requested[de-identified] Nursing, Physical Therapy, Occupational [address] [place] [address] Ave Name[de-identified] [facility] Provider[de-identified] PCP  [address] Needed[de-identified] Heart Failure Management, Evaluate Functional Status and Safety, Gait/ADL Training, Strengthening/Theraputic Exercises to Improve Function  Homebound Criteria Met[de-identified] Requires the Assistance of Another Person for Safe Ambulation or to Leave the Home  Supporting Clincal Findings[de-identified] Limited Endurance    DME Referral Provided  Referral made for DME?: Yes  DME referral completed for the following items[de-identified] Other ([facility])  DME Supplier Name[de-identified] Other (Add supplier name in comments) ([facility])    Treatment Team Recommendation: Short Term Rehab  Discharge Destination Plan[de-identified] Home with [place] at Discharge : Family    Additional Comments: Pt is recommended for short-term skilled rehab placement for his aftercare plan  CM spoke to pt about this aftercare recommendation  Pt is not agreeable with this recommendation  CM held conversation with pt about the risks of not pursuing the recommended level of aftercare  After discussion, pt remained opposed to SNF rehab placement for his aftercare plan  Pt reported he would be agreeable to having [address] [place] [address] as an alternative  CM provided pt w/ freedom of choice for VNA referrals  Pt requested referral to [facility]  CM made AIDIN referral to same  CM to follow

## 2023-02-03 NOTE — ASSESSMENT & PLAN NOTE
Lab Results   Component Value Date    HGBA1C 5 5 12/23/2022       Recent Labs     02/02/23  1625 02/02/23 2059 02/03/23  0656 02/03/23  1103   POCGLU 102 115 108 172*       Blood Sugar Average: Last 72 hrs:  (P) 115 [id number]     · Home regimen: Lantus 14 units daily, Humalog 4 units TID  · Blood sugars were acceptable on SSI - now increasing   · Add Lantus 6 daily, ct SSI  · Monitor blood sugars and adjust insulin accordingly

## 2023-02-03 NOTE — PROGRESS NOTES
[address] [facility]  Progress Note - Shin Arias 1960, 58 y o  male MRN: [id number]  Unit/Bed#: [facility] 531-01 Encounter: [id number]  Primary Care Provider: Ken Marion DO   Date and time admitted to hospital: 2/1/2023  9:27 AM    * Type 1 non-ST elevation myocardial infarction (NSTEMI) s/p ADE to in-stent restenosis of mid LAD  Assessment & Plan  · Presented to [facility] on 1/29/23 with shortness of breath and chest pain  · Volume overloaded on presentation  · Trop - 4037 -- 39,038 -- 8884  · Echo - EF 25 - 30% (from 65% on 4/20/22)  · H/o multivessel CAD with multivessel PCI  · [facility] hospitals for complex [facility]  · Cath (2/1/23) - Multivessel CAD with marked progression since the prior angiographic study in May 2022  Stents placed in the mid LAD exhibited significant discrete and-stent restenosis  The first OM branch, stented in 5/22, has become occluded  The distal RCA beyond the PDA has akso become occluded  LAD in--stent restenosis was interrogated by IVUS imaging and successfully treated with placement of a 4 0x13mm Orsiro ADE  An attempt was made to wire the occluded OM branch, but the wire could not be advanced beyond the stent in mid vessel     · Ct ASA, Effient, statin, BB(changed to Toprol XL from Carvedilol as BP on lower side)    CAD, multiple vessel  Assessment & Plan  • Progressed from May 2022  • Plan as above    Ischemic cardiomyopathy  Assessment & Plan  · Drop in EF from 65% to 25-30% in the setting of type I NSTEMI  · Was volume overloaded on presentation to [facility] on 1/29/23  · BP unable to tolerate Entresto  · Carvedilol changed to Toprol Xl 25 mg daily  · Volume maintenance through HD  · Torsemide discontinued as BP on lower side   · Lifevest before discharge - to be fitted today  · Entresto to be attempted as outpatient if BP improves    ESRD on dialysis [facility])  Assessment & Plan  Lab Results   Component Value Date    EGFR 4 02/03/2023    EGFR 4 02/02/2023    EGFR 4 02/01/2023    CREATININE 10 60 (H) 02/03/2023    CREATININE 11 90 (H) 02/02/2023    CREATININE 9 94 (H) 02/01/2023   • HD on MWF - managed per nephrology - input appreciated      Type 2 diabetes mellitus [facility])  Assessment & Plan  Lab Results   Component Value Date    HGBA1C 5 5 12/23/2022       Recent Labs     02/02/23  1625 02/02/23  2059 02/03/23  0656 02/03/23  1103   POCGLU 102 115 108 172*       Blood Sugar Average: Last 72 hrs:  (P) 115 [id number]     · Home regimen: Lantus 14 units daily, Humalog 4 units TID  · Blood sugars were acceptable on SSI - now increasing   · Add Lantus 6 daily, ct SSI  · Monitor blood sugars and adjust insulin accordingly    History of stroke  Assessment & Plan  · H/o left MCA CVA  · Ct ASA, Effient, statin    Primary hypertension  Assessment & Plan  · Carvedilol changed to Toprol XL 25 mg daily as BP on lower side  · Entresto/Torsemide held  · Meds with hold parameters   · Monitor    Moderate AS (aortic stenosis)  Assessment & Plan  · Similar to prior echo  · Monitor volume status    Mixed hyperlipidemia  Assessment & Plan  · Continue statin    Mood disorder ([address] )  Assessment & Plan  · Was begun on Depakote sprinkles 125 mg BID when seen by psychiatry at [facility] on 1/6/23  · Current home dose 250 mg BID - continue  · Outpatient follow-up    Hyponatremia  Assessment & Plan  · Management through HD    Acute respiratory failure with hypoxia ([address] )  Assessment & Plan  · Due to volume overload  · Had been on BiPAP initially  · Hypoxia now resolved  VTE Pharmacologic Prophylaxis: VTE Score: 4 Moderate Risk (Score 3-4) - Pharmacological DVT Prophylaxis Ordered: heparin  Patient Centered Rounds: Discussed with RN  Discussions with Specialists or Other Care Team Provider: Discussed with cardiology    Education and Discussions with Family / Patient: Updated  (son) at bedside  Time Spent for Care: 20 minutes   More than 50% of total time spent on counseling and coordination of care as described above  Current Length of Stay: 2 day(s)  Current Patient Status: Inpatient   Certification Statement: The patient will continue to require additional inpatient hospital stay due to BP on lower side, episode of vomiting, chest pain    Code Status: Level 1 - Full Code    Subjective:   Dialysis today shortened as had episode of chest pain and BP on the lower side  Later this morning had an episode of vomiting  No chest pain or shortness of breath at present  Objective:     Vitals:   Temp (24hrs), Av 3 °F (36 8 °C), Min:97 5 °F (36 4 °C), Max:99 8 °F (37 7 °C)    Temp:  [97 5 °F (36 4 °C)-99 8 °F (37 7 °C)] 98 5 °F (36 9 °C)  HR:  [65-75] 71  Resp:  [16-20] 16  BP: ()/() 104/78  SpO2:  [91 %-95 %] 95 %  Body mass index is 31 71 kg/m²  Physical Exam:   Physical Exam  Vitals reviewed  HENT:      Head: Normocephalic  Nose: Nose normal       Mouth/Throat:      Mouth: Mucous membranes are moist    Eyes:      Extraocular Movements: Extraocular movements intact  Cardiovascular:      Rate and Rhythm: Normal rate and regular rhythm  Pulmonary:      Effort: Pulmonary effort is normal  No respiratory distress  Breath sounds: Normal breath sounds  No wheezing  Abdominal:      General: Bowel sounds are normal  There is no distension  Palpations: Abdomen is soft  Tenderness: There is no abdominal tenderness  Musculoskeletal:         General: No swelling  Cervical back: Neck supple  Skin:     General: Skin is warm and dry  Neurological:      General: No focal deficit present  Mental Status: He is alert  Mental status is at baseline     Psychiatric:         Mood and Affect: Mood normal          Behavior: Behavior normal           Additional Data:     Labs:  Results from last 7 days   Lab Units 23  0615 23  0729   WBC Thousand/uL 6 22 8 27   HEMOGLOBIN g/dL 10 1* 10 7*   HEMATOCRIT % 31 5* 33 7*   PLATELETS Thousands/uL 168 180   NEUTROS PCT %  --  71   LYMPHS PCT %  --  16   MONOS PCT %  --  11   EOS PCT %  --  1     Results from last 7 days   Lab Units 02/03/23  0545 02/01/23  0729 01/31/23  0521   SODIUM mmol/L 136   < > 135   POTASSIUM mmol/L 4 1   < > 4 3   CHLORIDE mmol/L 97   < > 93*   CO2 mmol/L 23   < > 28   BUN mg/dL 90*   < > 52*   CREATININE mg/dL 10 60*   < > 7 29*   ANION GAP mmol/L 16*   < > 14*   CALCIUM mg/dL 9 0   < > 8 9   ALBUMIN g/dL  --   --  4 0   TOTAL BILIRUBIN mg/dL  --   --  1 04*   ALK PHOS U/L  --   --  56   ALT U/L  --   --  13   AST U/L  --   --  21   GLUCOSE RANDOM mg/dL 99   < > 118    < > = values in this interval not displayed  Results from last 7 days   Lab Units 01/29/23  0821   INR  1 03     Results from last 7 days   Lab Units 02/03/23  1602 02/03/23  1103 02/03/23  0656 02/02/23  2059 02/02/23  1625 02/02/23  1217 02/02/23  0605 02/01/23  2127 02/01/23  1842 02/01/23  1157 02/01/23  0744 01/31/23  2102   POC GLUCOSE mg/dl 126 172* 108 115 102 116 89 124 98 113 118 125         Results from last 7 days   Lab Units 01/29/23  2114 01/29/23  0844 01/29/23  0821   LACTIC ACID mmol/L 1 2  --  1 1   PROCALCITONIN ng/ml  --  0 31*  --        Lines/Drains:  Invasive Devices     Peripheral Intravenous Line  Duration           Peripheral IV 02/01/23 Right;Ventral (anterior) Forearm 2 days    Peripheral IV 02/02/23 Right;Upper;Ventral (anterior) Arm 1 day          Line  Duration           Hemodialysis AV Fistula Left Upper arm -- days                Recent Cultures (last 7 days):   Results from last 7 days   Lab Units 01/29/23  0844 01/29/23  0821   BLOOD CULTURE  No Growth After 5 Days  No Growth After 5 Days         Last 24 Hours Medication List:   Current Facility-Administered Medications   Medication Dose Route Frequency Provider Last Rate   • acetaminophen  975 mg Oral Q8H PRN Aidee Solorzano MD     • aspirin  81 mg Oral Daily Aidee Solorzano MD     • atorvastatin  40 mg Oral Daily With Iain Kulkarni MD     • calcium acetate  667 mg Oral TID With Meals Nanci Hudson MD     • chlorhexidine  15 mL Mouth/Throat Q12H Kaley Prater MD     • divalproex sodium  250 mg Oral Q12H Kaley Prater MD     • guaiFENesin  200 mg Oral Q4H PRN Nanci Hudson MD     • heparin (porcine)  5,000 Units Subcutaneous [facility] Nanci Hudson MD     • [START ON 2/4/2023] insulin glargine  6 Units Subcutaneous QAM Nanci Hudson MD     • insulin lispro  1-5 Units Subcutaneous 4x Daily [facility]) Nanci Hudson MD     • melatonin  3 mg Oral HS Nanci Hudson MD     • metoclopramide  5 mg Intravenous Once PRN Nanci Hudson MD     • metoprolol succinate  25 mg Oral Daily BRITTANY Valladares     • nitroglycerin  0 4 mg Sublingual Q5 Min PRN Nanci Hudson MD     • prasugrel  5 mg Oral Daily Nanci Hudson MD          Today, Patient Was Seen By: Nanci Hudson MD    **Please Note: This note may have been constructed using a voice recognition system  **

## 2023-02-03 NOTE — PLAN OF CARE
Serum potassium is 4 1 on 3K bath  Requested to only have 2 hours of treatment and was encourage to have 3 hours  Dr Elizabeth Meigs was aware who is in the unit that time of initiation  Very anxious needs   Problem: METABOLIC, FLUID AND ELECTROLYTES - ADULT  Goal: Electrolytes maintained within normal limits  Description: INTERVENTIONS:  - Monitor labs and assess patient for signs and symptoms of electrolyte imbalances  - Administer electrolyte replacement as ordered  - Monitor response to electrolyte replacements, including repeat lab results as appropriate  - Instruct patient on fluid and nutrition as appropriate  Outcome: Progressing  Goal: Fluid balance maintained  Description: INTERVENTIONS:  - Monitor labs   - Monitor I/O and WT  - Instruct patient on fluid and nutrition as appropriate  - Assess for signs & symptoms of volume excess or deficit  Outcome: Progressing   Post-Dialysis RN Treatment Note    Blood Pressure:  Pre 98/40 mm/Hg  Post 103/36 mmHg   EDW  tbd kg    Weight:  Pre None Reported kg   Post None Reported kg   Mode of weight measurement: N/A unable to weigh because unable to stand on the scale feels weak0   Volume Removed  0 ml +400   Treatment duration 101 minutes    NS given  Yes, bolus given due to hypotension    Treatment shortened? No   Medications given during Rx None Reported   Estimated Kt/V  None Reported   Access type: AV fistula   Access Issues: No    Report called to primary nurse   Yes        Kade Sanchez RN   Hemodialysis was terminated early due to patient c/o feeling dizzy, BP was stable, given bolus and then complains of chest pain   Dr Vin Mojica was aware

## 2023-02-03 NOTE — ASSESSMENT & PLAN NOTE
· Drop in EF from 65% to 25-30% in the setting of type I NSTEMI  · Was volume overloaded on presentation to [facility] on 1/29/23  · BP unable to tolerate Entresto  · Carvedilol changed to Toprol Xl 25 mg daily  · Volume maintenance through HD  · Torsemide discontinued as BP on lower side   · Lifevest before discharge - to be fitted today  · Entresto to be attempted as outpatient if BP improves

## 2023-02-03 NOTE — ASSESSMENT & PLAN NOTE
· Presented to [facility] on 1/29/23 with shortness of breath and chest pain  · Volume overloaded on presentation  · Trop - 4037 -- 78,584 -- 8884  · Echo - EF 25 - 30% (from 65% on 4/20/22)  · H/o multivessel CAD with multivessel PCI  · [facility]  · Cath (2/1/23) - Multivessel CAD with marked progression since the prior angiographic study in May 2022  Stents placed in the mid LAD exhibited significant discrete and-stent restenosis  The first OM branch, stented in 5/22, has become occluded  The distal RCA beyond the PDA has akso become occluded  LAD in--stent restenosis was interrogated by IVUS imaging and successfully treated with placement of a 4 0x13mm Orsiro ADE  An attempt was made to wire the occluded OM branch, but the wire could not be advanced beyond the stent in mid vessel     · Ct ASA, Effient, statin, BB(changed to Toprol XL from Carvedilol as BP on lower side)

## 2023-02-04 ENCOUNTER — HOME HEALTH ADMISSION (OUTPATIENT)
Dept: HOME HEALTH SERVICES | Facility: HOME HEALTHCARE | Age: 63
End: 2023-02-04

## 2023-02-04 VITALS
BODY MASS INDEX: 32.04 KG/M2 | DIASTOLIC BLOOD PRESSURE: 61 MMHG | SYSTOLIC BLOOD PRESSURE: 105 MMHG | RESPIRATION RATE: 17 BRPM | TEMPERATURE: 98.1 F | WEIGHT: 216.93 LBS | OXYGEN SATURATION: 98 % | HEART RATE: 69 BPM

## 2023-02-04 PROBLEM — E87.1 HYPONATREMIA: Status: RESOLVED | Noted: 2023-02-01 | Resolved: 2023-02-04

## 2023-02-04 PROBLEM — J96.01 ACUTE RESPIRATORY FAILURE WITH HYPOXIA (HCC): Status: RESOLVED | Noted: 2023-01-29 | Resolved: 2023-02-04

## 2023-02-04 LAB — GLUCOSE SERPL-MCNC: 114 MG/DL (ref 65–140)

## 2023-02-04 RX ORDER — METOPROLOL SUCCINATE 25 MG/1
25 TABLET, EXTENDED RELEASE ORAL DAILY
Qty: 30 TABLET | Refills: 0 | Status: SHIPPED | OUTPATIENT
Start: 2023-02-05 | End: 2023-02-14 | Stop reason: SDUPTHER

## 2023-02-04 RX ORDER — BACITRACIN, NEOMYCIN, POLYMYXIN B 400; 3.5; 5 [USP'U]/G; MG/G; [USP'U]/G
1 OINTMENT TOPICAL 2 TIMES DAILY
Status: DISCONTINUED | OUTPATIENT
Start: 2023-02-04 | End: 2023-02-04 | Stop reason: HOSPADM

## 2023-02-04 RX ADMIN — BACITRACIN ZINC, NEOMYCIN, POLYMYXIN B 1 SMALL APPLICATION: 400; 3.5; 5 OINTMENT TOPICAL at 09:54

## 2023-02-04 RX ADMIN — CALCIUM ACETATE 667 MG: 667 CAPSULE ORAL at 08:18

## 2023-02-04 RX ADMIN — PRASUGREL 5 MG: 10 TABLET, FILM COATED ORAL at 08:17

## 2023-02-04 RX ADMIN — DIVALPROEX SODIUM 250 MG: 125 CAPSULE, COATED PELLETS ORAL at 08:18

## 2023-02-04 RX ADMIN — ACETAMINOPHEN 975 MG: 325 TABLET ORAL at 00:30

## 2023-02-04 RX ADMIN — METOPROLOL SUCCINATE 25 MG: 25 TABLET, FILM COATED, EXTENDED RELEASE ORAL at 08:18

## 2023-02-04 RX ADMIN — HEPARIN SODIUM 5000 UNITS: 5000 INJECTION INTRAVENOUS; SUBCUTANEOUS at 05:56

## 2023-02-04 RX ADMIN — CHLORHEXIDINE GLUCONATE 0.12% ORAL RINSE 15 ML: 1.2 LIQUID ORAL at 08:17

## 2023-02-04 RX ADMIN — ASPIRIN 81 MG: 81 TABLET, COATED ORAL at 08:17

## 2023-02-04 NOTE — DISCHARGE SUMMARY
Discharging Physician / Practitioner: Ayesha Cheng MD  PCP: Ken Marion DO  Admission Date:   Admission Orders (From admission, onward)     Ordered        02/01/23 1012  Inpatient Admission  Once                      Discharge Date: 02/04/23     Principal discharge diagnoses:  1  Type I NSTEMI -s/p ADE to in-stent restenosis of mid LAD  2  Ischemic cardiomyopathy  3  Acute respiratory failure with hypoxia - resolved  4  Hyponatremia - resolved  5  Acute systolic CHF    Secondary diagnoses:  1  Type 2 diabetes  2  History of stroke  3  Primary hypertension  4  Moderate aortic stenosis  5  Hyperlipidemia  6  Mood disorder  7  ESRD on hemodialysis    Consultations During Hospital Stay:  Cardiology  Nephrology    Procedures Performed:   Cardiac catheterization -   1st Mrg lesion is 100% stenosed  •  RPAV lesion is 100% stenosed  •  Mid LAD lesion is 80% stenosed  •  RPDA lesion is 60% stenosed  •  The angioplasty was pre-stent angioplasty  •  The angioplasty was pre-stent angioplasty    Multivessel CAD with marked progression since the prior angiographic study in May 2022  Stents placed in the mid LAD exhibited significant discrete and-stent restenosis  The first OM branch, stented in 5/22, has become occluded  The distal RCA beyond the PDA has akso become occluded  LAD in--stent restenosis was interrogated by [facility] imaging and successfully treated with placement of a 4 0x13mm Orsiro ADE  An attempt was made to wire the occluded OM branch, but the wire could not be advanced beyond the stent in mid vessel  Plan: DAPT, statin, compliance with medical and hemodialysis therapy, increased activity, weight loss  Hospital Course:   Shin Arias is a 58 y o  male patient who originally presented to the hospital on 2/1/2023 as a transfer from [address] [place] [address] for complex cardiac cath  He presented on 1/29/2023 with shortness of breath and chest pain    He was volume overloaded on presentation  He was found to have a type I NSTEMI  He has a history of multivessel coronary artery disease with multivessel PCI's and  was hence transferred here for cardiac cath  ADE was placed over his LAD in-stent restenosis  Attempt was made to wire the occluded OM branch but wire could not be advanced beyond the stent in mid vessel  He was noted to have new cardiomyopathy with drop in EF to 25 to 30% from 65%  His blood pressure was unable to tolerate Entresto  Due to hypotension carvedilol was changed to Toprol-XL 25 mg daily  Attempt will be made to begin Katherine Bryan as an outpatient if BP improves  He was discharged with a LifeVest      He was volume overloaded on initial presentation with hypoxic respiratory failure but resolved  Volume status improved through hemodialysis  He was unable to continue his home torsemide due to hypotension  His home diabetes regimen includes Lantus 14 units daily and Humalog 4 units 3 times daily  At the time of discharge his blood sugars were controlled without any insulin  He was advised to monitor his blood sugars and begin a low-dose of insulin if blood sugars start rising and to continue to adjust his insulin based on sugar levels and discussion with his primary endocrinologist      Condition at Discharge: stable    Discharge Day Visit / Exam:   Subjective:    Vitals: Blood Pressure: 105/61 (02/04/23 0754)  Pulse: 69 (02/04/23 0754)  Temperature: 98 1 °F (36 7 °C) (02/04/23 0754)  Temp Source: Oral (02/04/23 0754)  Respirations: 17 (02/04/23 0754)  Weight - Scale: 98 4 kg (216 lb 14 9 oz) (02/04/23 0555)  SpO2: 98 % (02/04/23 0754)  Exam:   Physical Exam  Vitals reviewed  HENT:      Head: Normocephalic  Nose: Nose normal       Mouth/Throat:      Mouth: Mucous membranes are moist    Eyes:      Extraocular Movements: Extraocular movements intact  Cardiovascular:      Rate and Rhythm: Normal rate and regular rhythm     Pulmonary:      Effort: Pulmonary effort is normal  No respiratory distress  Breath sounds: Normal breath sounds  No wheezing  Abdominal:      General: Bowel sounds are normal  There is no distension  Palpations: Abdomen is soft  Tenderness: There is no abdominal tenderness  Musculoskeletal:         General: No swelling  Cervical back: Neck supple  Skin:     General: Skin is warm and dry  Neurological:      General: No focal deficit present  Mental Status: He is alert  Mental status is at baseline  Psychiatric:         Mood and Affect: Mood normal          Behavior: Behavior normal           Discussion with Family: Updated  (wife and daughter) at bedside  And son on the phone    Discharge instructions/Information to patient and family:   See after visit summary for information provided to patient and family  Provisions for Follow-Up Care:  See after visit summary for information related to follow-up care and any pertinent home health orders  Disposition:   Home with [facility] Services (Reminder: Complete face to face encounter)    Planned Readmission: No     Discharge Statement:  I spent 40 minutes discharging the patient  This time was spent on the day of discharge  I had direct contact with the patient on the day of discharge  Greater than 50% of the total time was spent examining patient, answering all patient questions, arranging and discussing plan of care with patient as well as directly providing post-discharge instructions  Additional time then spent on discharge activities  Discharge Medications:  See after visit summary for reconciled discharge medications provided to patient and/or family        **Please Note: This note may have been constructed using a voice recognition system**

## 2023-02-04 NOTE — PROGRESS NOTES
NEPHROLOGY PROGRESS NOTE   Kristin Hrerera 58 y o  male MRN: [id number]  Unit/Bed#: [facility] 531-01 Encounter: [id number]        ASSESSMENT & PLAN     41-year-old male with a history of end-stage kidney disease on hemodialysis presented with shortness of breath history of coronary artery disease     1  End-stage kidney disease on hemodialysis Monday Wednesday Friday [address]  ? Monday Wednesday Friday and with a left AV fistula with no difficultt  ? Electrolytes and acid-base status are stable  ? Next tx monday  ? Symptomatically stable and electrolytes stable     2  Type I NSTEMI status post drug-eluting stent, ischemic cardiomyopathy  ? Carvedilol 12 5 mg 2 times daily discontinued due to low bp  ? Entresto 24/26 mg two times daily discontinue due to low bp  ? Torsemide 100 mg daily-will hold for now due to minimal UO  ? Prasugrel 5 mg daily  ? Started Metoprolol XL 25 mg daily  ? Cardiology following  ? Statin therapy  ? Aspirin  ? BP better this AM in 125/55 range     3  Anemia of chronic kidney disease  ? Current hemoglobins are stable post cardiac catheterization  ? Ongoing management as an outpatient  ? Last recorded hgn is 10 1 stable  ? monitor     4  Hyponatremia  ? Sodium has now improved at 136  ? Monitor volume status     5  Volume overload  ? This has significantly improved he has no hypoxia  ? We will continue to challenge his dry weight on dialysis as blood pressure allows  ? His next HD treatment is monday     6  CKD bone mineral disease  ? Continue calcium acetate for hyperphosphatemia and should be monitored as outpatient  ? His phos remains elevated     7  History of a CVA  ? This is stable  ? Has some baseline anxiety  ?  Family at his bedside    Spoke to patient son Monie Marquez and discussed plan from a renal standpoint, long time patient at [facility] and Dr Chaparrita Russo    Discussed with [facility] Dr Curtis Pichardo with plan for DC and follow up [address]:    Patient was seen playing cards  No sob  Appetite stable    12 point review of systems was otherwise negative besides what is mentioned above      Medications:    Current Facility-Administered Medications:   •  acetaminophen (TYLENOL) tablet 975 mg, 975 mg, Oral, Q8H PRN, Cedrick Lopez MD, 975 mg at 02/04/23 0030  •  aspirin (ECOTRIN LOW STRENGTH) EC tablet 81 mg, 81 mg, Oral, Daily, Cedrick Lopez MD, 81 mg at 02/04/23 2170  •  atorvastatin (LIPITOR) tablet 40 mg, 40 mg, Oral, Daily With Veronica Cobian MD, 40 mg at 02/03/23 1608  •  calcium acetate (PHOSLO) capsule 667 mg, 667 mg, Oral, TID With Meals, Cedrick Lopez MD, 667 mg at 02/04/23 0818  •  chlorhexidine (PERIDEX) 0 12 % oral rinse 15 mL, 15 mL, Mouth/Throat, Q12H [address], Cedrick Lopez MD, 15 mL at 02/04/23 0817  •  divalproex sodium (DEPAKOTE SPRINKLE) capsule 250 mg, 250 mg, Oral, Q12H [address], Cedrick Lopez MD, 250 mg at 02/04/23 0818  •  guaiFENesin (ROBITUSSIN) oral liquid 200 mg, 200 mg, Oral, Q4H PRN, Cedrick Lopez MD  •  heparin (porcine) subcutaneous injection 5,000 Units, 5,000 Units, Subcutaneous, Q8H [address], Cedrick Lopez MD, 5,000 Units at 02/04/23 0556  •  insulin lispro (HumaLOG) 100 units/mL subcutaneous injection 1-5 Units, 1-5 Units, Subcutaneous, 4x Daily (AC & HS) **AND** Fingerstick Glucose (POCT), , , 4x Daily AC and at bedtime, Cedrick Lopez MD  •  melatonin tablet 3 mg, 3 mg, Oral, HS, Cedrick Lopez MD  •  metoclopramide (REGLAN) injection 5 mg, 5 mg, Intravenous, Once PRN, Cedrick Lopez MD  •  metoprolol succinate (TOPROL-XL) 24 hr tablet 25 mg, 25 mg, Oral, Daily, BRITTANY Dooley, 25 mg at 02/04/23 0818  •  neomycin-bacitracin-polymyxin b (NEOSPORIN) ointment 1 small application, 1 small application, Topical, BID, Cedrick Lopez MD, 1 small application at 27/45/81 0954  •  nitroglycerin (NITROSTAT) SL tablet 0 4 mg, 0 4 mg, Sublingual, Q5 Min PRN, Cedrick Lopez MD  •  prasugrel (EFFIENT) tablet 5 mg, 5 mg, Oral, Daily, Demetria Bernal MD, 5 mg at 02/04/23 0817    OBJECTIVE:    Vitals:    02/04/23 0319 02/04/23 0325 02/04/23 0555 02/04/23 0754   BP: 127/55 127/55  105/61   BP Location:    Right arm   Pulse: 66 66  69   Resp:    17   Temp:    98 1 °F (36 7 °C)   TempSrc:    Oral   SpO2: 94% 94%  98%   Weight:   98 4 kg (216 lb 14 9 oz)         Temp:  [98 1 °F (36 7 °C)-98 5 °F (36 9 °C)] 98 1 °F (36 7 °C)  HR:  [66-75] 69  Resp:  [16-17] 17  BP: ()/() 105/61  SpO2:  [91 %-98 %] 98 %     Body mass index is 32 04 kg/m²  Weight (last 2 days)     Date/Time Weight    02/04/23 0555 98 4 (216 93)    02/03/23 0548 97 4 (214 73)    02/02/23 0700 95 3 (210 1)    02/02/23 0600 95 (209 5)          I/O last 3 completed shifts: In: 5 [P O :120; I V :500;  Other:100]  Out: 316 [Other:316]    I/O this shift:  In: 330 [P O :330]  Out: -       Physical exam:    General: no acute distress, cooperative  Eyes: conjunctivae pink, anicteric sclerae  ENT: lips and mucous membranes moist, no exudates, normal external ears  Neck: ROM intact, no JVD  Chest: No respiratory distress, no accessory muscle use  CVS: normal rate, non pericardial friction rub  Abdomen: distended abdomen  Extremities: no edema of both legs  Skin: no rash  Neuro: awake, alert, oriented, grossly intact  Psych:  Pleasant affect    Invasive Devices:      Lab Results:   Results from last 7 days   Lab Units 02/03/23  0545 02/02/23  0615 02/01/23  0729 01/31/23  0521 01/30/23  1505 01/30/23  0458 01/29/23  1415 01/29/23  0844 01/29/23  0821   WBC Thousand/uL  --  6 22 8 27 8 33  --  11 00*  --   --  11 83*   HEMOGLOBIN g/dL  --  10 1* 10 7* 9 7*  --  9 6*  --   --  10 9*   I STAT HEMOGLOBIN g/dl  --   --   --   --  9 9*  --   --   --   --    HEMATOCRIT %  --  31 5* 33 7* 29 9*  --  30 2*  --   --  35 6*   HEMATOCRIT, ISTAT %  --   --   --   --  29*  --   --   --   --    PLATELETS Thousands/uL  --  168 180 160  --  148* 172  --  181   POTASSIUM mmol/L 4 1 4 6 4 4 4 3  -- 5 2  --   --  3 5   CHLORIDE mmol/L 97 93* 89* 93*  --  98  --   --  114*   CO2 mmol/L 23 22 25 28  --  25  --   --  20*   CO2, I-STAT mmol/L  --   --   --   --  25  --   --   --   --    BUN mg/dL 90* 112* 89* 52*  --  68*  --   --  36*   CREATININE mg/dL 10 60* 11 90* 9 94* 7 29*  --  9 62*  --   --  5 40*   CALCIUM mg/dL 9 0 8 4 8 8 8 9  --  8 8  --   --  6 0*   MAGNESIUM mg/dL  --  3 6* 3 2* 3 0*  --   --   --   --  1 5*   PHOSPHORUS mg/dL  --  6 7*  --  5 9*  --   --   --   --  3 0   ALK PHOS U/L  --   --   --  56  --  60  --   --  51   ALT U/L  --   --   --  13  --  13  --   --  7   AST U/L  --   --   --  21  --  27  --   --  13   GLUCOSE, ISTAT mg/dl  --   --   --   --  142*  --   --   --   --    BLOOD CULTURE   --   --   --   --   --   --   --  No Growth After 5 Days  No Growth After 5 Days  Portions of the record may have been created with voice recognition software  Occasional wrong word or "sound a like" substitutions may have occurred due to the inherent limitations of voice recognition software  Read the chart carefully and recognize, using context, where substitutions have occurred  If you have any questions, please contact the dictating provider

## 2023-02-06 ENCOUNTER — TRANSITIONAL CARE MANAGEMENT (OUTPATIENT)
Dept: INTERNAL MEDICINE CLINIC | Facility: CLINIC | Age: 63
End: 2023-02-06

## 2023-02-06 ENCOUNTER — HOME CARE VISIT (OUTPATIENT)
Dept: HOME HEALTH SERVICES | Facility: HOME HEALTHCARE | Age: 63
End: 2023-02-06

## 2023-02-06 VITALS
RESPIRATION RATE: 18 BRPM | OXYGEN SATURATION: 97 % | HEIGHT: 69 IN | WEIGHT: 211 LBS | HEART RATE: 78 BPM | TEMPERATURE: 99 F | DIASTOLIC BLOOD PRESSURE: 68 MMHG | BODY MASS INDEX: 31.25 KG/M2 | SYSTOLIC BLOOD PRESSURE: 122 MMHG

## 2023-02-06 DIAGNOSIS — Z71.89 COMPLEX CARE COORDINATION: Primary | ICD-10-CM

## 2023-02-06 NOTE — CASE COMMUNICATION
[facility] has admitted your patient to [facility] service with the following disciplines:      SN and PT  This report is informational only, no responses is needed  Primary focus of home health care- Cardiac   Patient stated goals of care- Improve mobility and fatigue   Anticipated visit pattern and next visit date- Next SN visit 2/10/2023  2w2 1w4   See medication list - meds in home differ from AVS- All medications in home, revi ewed discontinued medications with pt, daughter, and wife  Potential barriers to goal achievement-Improve fatigue and mobility  Other pertinent information- Pts son will be scheduling Cardiac Rehab sometime this week  Thank you for allowing us to participate in the care of your patient        [facility] RN

## 2023-02-07 ENCOUNTER — PATIENT OUTREACH (OUTPATIENT)
Dept: CASE MANAGEMENT | Facility: OTHER | Age: 63
End: 2023-02-07

## 2023-02-07 DIAGNOSIS — E11.65 TYPE 2 DIABETES MELLITUS WITH HYPERGLYCEMIA, WITH LONG-TERM CURRENT USE OF INSULIN (HCC): ICD-10-CM

## 2023-02-07 DIAGNOSIS — Z79.4 TYPE 2 DIABETES MELLITUS WITH HYPERGLYCEMIA, WITH LONG-TERM CURRENT USE OF INSULIN (HCC): ICD-10-CM

## 2023-02-07 RX ORDER — CALCIUM CITRATE/VITAMIN D3 200MG-6.25
1 TABLET ORAL 3 TIMES DAILY
Qty: 300 STRIP | Refills: 1 | Status: SHIPPED | OUTPATIENT
Start: 2023-02-07

## 2023-02-07 NOTE — PROGRESS NOTES
Message and phone number left to call Henrry Alarcon Care Manager, if he identifies any needs or had any questions

## 2023-02-08 ENCOUNTER — HOME CARE VISIT (OUTPATIENT)
Dept: HOME HEALTH SERVICES | Facility: HOME HEALTHCARE | Age: 63
End: 2023-02-08

## 2023-02-08 VITALS — HEART RATE: 76 BPM | DIASTOLIC BLOOD PRESSURE: 64 MMHG | SYSTOLIC BLOOD PRESSURE: 118 MMHG

## 2023-02-09 ENCOUNTER — PATIENT OUTREACH (OUTPATIENT)
Dept: INTERNAL MEDICINE CLINIC | Facility: CLINIC | Age: 63
End: 2023-02-09

## 2023-02-09 ENCOUNTER — OFFICE VISIT (OUTPATIENT)
Dept: INTERNAL MEDICINE CLINIC | Facility: CLINIC | Age: 63
End: 2023-02-09

## 2023-02-09 VITALS
WEIGHT: 218 LBS | OXYGEN SATURATION: 99 % | HEART RATE: 69 BPM | DIASTOLIC BLOOD PRESSURE: 60 MMHG | TEMPERATURE: 98 F | RESPIRATION RATE: 18 BRPM | BODY MASS INDEX: 32.29 KG/M2 | SYSTOLIC BLOOD PRESSURE: 102 MMHG | HEIGHT: 69 IN

## 2023-02-09 DIAGNOSIS — I25.10 CAD, MULTIPLE VESSEL: ICD-10-CM

## 2023-02-09 DIAGNOSIS — F39 MOOD DISORDER (HCC): Chronic | ICD-10-CM

## 2023-02-09 DIAGNOSIS — N18.6 ESRD ON DIALYSIS (HCC): ICD-10-CM

## 2023-02-09 DIAGNOSIS — F41.8 ANXIETY ASSOCIATED WITH DEPRESSION: ICD-10-CM

## 2023-02-09 DIAGNOSIS — Z09 HOSPITAL DISCHARGE FOLLOW-UP: Primary | ICD-10-CM

## 2023-02-09 DIAGNOSIS — Z99.2 ESRD ON DIALYSIS (HCC): ICD-10-CM

## 2023-02-09 PROBLEM — K52.9 ENTERITIS: Status: RESOLVED | Noted: 2022-01-02 | Resolved: 2023-02-09

## 2023-02-09 PROBLEM — F69 BEHAVIOR CONCERN IN ADULT: Status: RESOLVED | Noted: 2018-08-03 | Resolved: 2023-02-09

## 2023-02-09 PROBLEM — D72.829 LEUKOCYTOSIS: Status: RESOLVED | Noted: 2022-01-02 | Resolved: 2023-02-09

## 2023-02-09 NOTE — PROGRESS NOTES
Assessment & Plan     1  Hospital discharge follow-up    2  Mood disorder ([address] )  Comments:  doing better with depakote 250mg BID, c/w rx and at current dose   family is unable to find a psychiatrist for Vishnu Chand that is in-network and accepting new pts    3  Anxiety associated with depression  Comments:  c/w plan as above, f/u in 2 mos    4  CAD, multiple vessel  Comments:  s/p cardiac cath at [facility] with instent stenosis and multivessel disease  c/w antiplatelet therapy and f/u [facility](needs antiplatelet resistance testing?)    5  ESRD on dialysis [facility])  Comments:  goes M/W/F and was at 215 lbs yesterday after treatment(weighed today with shoes and lifevest on)  c/w care per nephrology         Subjective     Transitional Care Management Review:   Gordon Cronin is a 58 y o  male here for TCM follow up  During the TCM phone call patient stated:  TCM Call     Date and time call was made  2/6/2023 12:08 PM    Hospital care reviewed  Records reviewed    Patient was hospitialized at  [facility]    Date of Admission  02/01/23    Date of discharge  02/04/23    Diagnosis  Type 1 non-ST elevation myocardial infarction (NSTEMI) s/p ADE to in-stent restenosis of mid LAD    Disposition  Home    Were the patients medications reviewed and updated  No    Current Symptoms  None      TCM Call     Post hospital issues  None    Should patient be enrolled in anticoag monitoring? No    Scheduled for follow up?   Yes    Patient refusal reason  DID NOT RETURN ANY OF MY PHONE CALLS TO SCHEDULE HIS APPT    Did you obtain your prescribed medications  Yes    Do you need help managing your prescriptions or medications  No    Is transportation to your appointment needed  No    I have advised the patient to call PCP with any new or worsening symptoms  Frances Bone MA    Living Arrangements  Family members    Support System  None    The type of support provided  Emotional; Financial; Physical    Are you recieving any outpatient services  No    Are you recieving home care services  No    Are you using any community resources  No    Current waiver services  No    Have you fallen in the last 12 months  No    Interperter language line needed  No    Counseling  Family    Comments  LEFT SEVERAL MESSAGES TO CALL BACK TO SCHEDULE HIS TCM         HPI     Here for hospital discharge follow up, here with daughter and wife who provide [de-identified] of history  He was treated in [facility] for SOB and ACS, had cardiac cath which revealed multi vessel disease and in-stent stenosis  He was treated, improved and discharged to home with lifevest(EF dropped to 25-30%)  daugther and wife and other family members are taking care of Irma Jackson as he is unable to take care of himself for nearly all ADLs and IADLs  Wife and daughter left their jobs to take care of Irma Jackson  He is unable to bathe, dress, cook, manage finances on his own and he cannot leave the home without assistance  He is seeing [facility] on 2/28/23 to follow up on multivessel CAD  ROS Otherwise negative, no other complaints  Review of Systems   Constitutional: Negative for fever  HENT: Negative for congestion  Eyes: Negative for visual disturbance  Respiratory: Negative for shortness of breath  Cardiovascular: Negative for chest pain  Gastrointestinal: Negative for abdominal pain  Genitourinary: Negative for difficulty urinating  Musculoskeletal: Negative for joint swelling  Skin: Negative for pallor  Allergic/Immunologic: Negative for immunocompromised state  Neurological: Negative for dizziness  Psychiatric/Behavioral: Positive for dysphoric mood (but improved with depakote)  Objective     /60 (BP Location: Right arm, Patient Position: Sitting, Cuff Size: Adult)   Pulse 69   Temp 98 °F (36 7 °C) (Tympanic)   Resp 18   Ht 5' 9" (1 753 m)   Wt 98 9 kg (218 lb)   SpO2 99%   BMI 32 19 kg/m²      Physical Exam  Vitals reviewed     Constitutional: General: He is not in acute distress  Appearance: He is well-developed  Comments: Wearing lifevest(no shocks per patient)   HENT:      Head: Normocephalic and atraumatic  Right Ear: Tympanic membrane normal       Left Ear: Tympanic membrane normal    Eyes:      Conjunctiva/sclera: Conjunctivae normal    Cardiovascular:      Rate and Rhythm: Normal rate and regular rhythm  Heart sounds:     No gallop  Pulmonary:      Effort: Pulmonary effort is normal       Breath sounds: No wheezing or rales  Abdominal:      General: Bowel sounds are normal       Palpations: Abdomen is soft  Tenderness: There is no abdominal tenderness  Musculoskeletal:      Cervical back: Normal range of motion  Right lower leg: No edema  Left lower leg: No edema  Neurological:      Mental Status: He is alert  Psychiatric:         Mood and Affect: Mood is anxious  Affect is tearful        Comments: but coping/doing better       Medications have been reviewed by provider in current encounter    Mary Regalado DO

## 2023-02-09 NOTE — PROGRESS NOTES
Nnamdi Pratt patients son returned my call  Father is working with [employer]  He has his medications  Peter's sister may apply to be his care givers  Evita Austin has his medications  Will see cardiology on 2/14/23 and will start cardiac rehab on 2/13/23  Does not feel I need to call back they are managing things well

## 2023-02-10 ENCOUNTER — HOME CARE VISIT (OUTPATIENT)
Dept: HOME HEALTH SERVICES | Facility: HOME HEALTHCARE | Age: 63
End: 2023-02-10

## 2023-02-11 ENCOUNTER — APPOINTMENT (EMERGENCY)
Dept: RADIOLOGY | Facility: HOSPITAL | Age: 63
End: 2023-02-11

## 2023-02-11 ENCOUNTER — HOSPITAL ENCOUNTER (EMERGENCY)
Facility: HOSPITAL | Age: 63
Discharge: HOME | End: 2023-02-11
Attending: EMERGENCY MEDICINE

## 2023-02-11 VITALS
HEART RATE: 64 BPM | RESPIRATION RATE: 18 BRPM | SYSTOLIC BLOOD PRESSURE: 109 MMHG | OXYGEN SATURATION: 97 % | TEMPERATURE: 98.1 F | DIASTOLIC BLOOD PRESSURE: 53 MMHG

## 2023-02-11 VITALS
SYSTOLIC BLOOD PRESSURE: 104 MMHG | TEMPERATURE: 97.7 F | RESPIRATION RATE: 16 BRPM | DIASTOLIC BLOOD PRESSURE: 62 MMHG | HEART RATE: 74 BPM | OXYGEN SATURATION: 95 %

## 2023-02-11 DIAGNOSIS — R07.9 CHEST PAIN: Primary | ICD-10-CM

## 2023-02-11 LAB
2HR DELTA HS TROPONIN: -32 NG/L
ALBUMIN SERPL BCP-MCNC: 3.7 G/DL (ref 3.5–5)
ALP SERPL-CCNC: 97 U/L (ref 46–116)
ALT SERPL W P-5'-P-CCNC: 18 U/L (ref 12–78)
ANION GAP SERPL CALCULATED.3IONS-SCNC: 7 MMOL/L (ref 4–13)
AST SERPL W P-5'-P-CCNC: 10 U/L (ref 5–45)
ATRIAL RATE: 70 BPM
BASOPHILS # BLD AUTO: 0.03 THOUSANDS/ÂΜL (ref 0–0.1)
BASOPHILS NFR BLD AUTO: 0 % (ref 0–1)
BILIRUB SERPL-MCNC: 0.53 MG/DL (ref 0.2–1)
BUN SERPL-MCNC: 29 MG/DL (ref 5–25)
CALCIUM SERPL-MCNC: 9.7 MG/DL (ref 8.3–10.1)
CARDIAC TROPONIN I PNL SERPL HS: 210 NG/L
CARDIAC TROPONIN I PNL SERPL HS: 242 NG/L
CHLORIDE SERPL-SCNC: 94 MMOL/L (ref 96–108)
CO2 SERPL-SCNC: 34 MMOL/L (ref 21–32)
CREAT SERPL-MCNC: 5.49 MG/DL (ref 0.6–1.3)
EOSINOPHIL # BLD AUTO: 0.1 THOUSAND/ÂΜL (ref 0–0.61)
EOSINOPHIL NFR BLD AUTO: 1 % (ref 0–6)
ERYTHROCYTE [DISTWIDTH] IN BLOOD BY AUTOMATED COUNT: 14.7 % (ref 11.6–15.1)
GFR SERPL CREATININE-BSD FRML MDRD: 10 ML/MIN/1.73SQ M
GLUCOSE SERPL-MCNC: 90 MG/DL (ref 65–140)
HCT VFR BLD AUTO: 33.1 % (ref 36.5–49.3)
HGB BLD-MCNC: 10.5 G/DL (ref 12–17)
IMM GRANULOCYTES # BLD AUTO: 0.15 THOUSAND/UL (ref 0–0.2)
IMM GRANULOCYTES NFR BLD AUTO: 2 % (ref 0–2)
LYMPHOCYTES # BLD AUTO: 1.39 THOUSANDS/ÂΜL (ref 0.6–4.47)
LYMPHOCYTES NFR BLD AUTO: 19 % (ref 14–44)
MCH RBC QN AUTO: 30 PG (ref 26.8–34.3)
MCHC RBC AUTO-ENTMCNC: 31.7 G/DL (ref 31.4–37.4)
MCV RBC AUTO: 95 FL (ref 82–98)
MONOCYTES # BLD AUTO: 0.81 THOUSAND/ÂΜL (ref 0.17–1.22)
MONOCYTES NFR BLD AUTO: 11 % (ref 4–12)
NEUTROPHILS # BLD AUTO: 4.73 THOUSANDS/ÂΜL (ref 1.85–7.62)
NEUTS SEG NFR BLD AUTO: 67 % (ref 43–75)
NRBC BLD AUTO-RTO: 0 /100 WBCS
NT-PROBNP SERPL-MCNC: ABNORMAL PG/ML
P AXIS: 65 DEGREES
PLATELET # BLD AUTO: 221 THOUSANDS/UL (ref 149–390)
PMV BLD AUTO: 10.5 FL (ref 8.9–12.7)
POTASSIUM SERPL-SCNC: 3.9 MMOL/L (ref 3.5–5.3)
PR INTERVAL: 184 MS
PROT SERPL-MCNC: 8 G/DL (ref 6.4–8.4)
QRS AXIS: -9 DEGREES
QRSD INTERVAL: 146 MS
QT INTERVAL: 460 MS
QTC INTERVAL: 496 MS
RBC # BLD AUTO: 3.5 MILLION/UL (ref 3.88–5.62)
SODIUM SERPL-SCNC: 135 MMOL/L (ref 135–147)
T WAVE AXIS: 27 DEGREES
VENTRICULAR RATE: 70 BPM
WBC # BLD AUTO: 7.21 THOUSAND/UL (ref 4.31–10.16)

## 2023-02-11 NOTE — ED ATTENDING ATTESTATION
Final Diagnoses:     1  Chest pain      ED Course as of 02/11/23 0606   Sat Feb 11, 2023   4506 POCUS Cardiac + IVC:  - transthoracic echocardiogram was performed at bedside by myself and resident  - Images collected were poor quality  This was a technically difficult study due to lung interference  - Apical, parasternal, subcostal views were obtained/attempted  - The main purpose of this study was to r/o obvious pericardial tamponade  - Most views were obtained for educational reasons    - Findings: There was no obvious pericardial effusion   There was no obvious pleural effusion  LV function / EF very reduced     EPSS >1cm   RV function     IVC was IVC < 2 1cm; >50% compression w/ sniff likely RAP 3 mmHg - squished    Summary: Systolic dysfunction  No pericardial effusion  0346 hs TnI 0hr(!): 242  Lower than 2 weeks ago  Will do delta  Likely elevated due to Type IV elevation  0404 NT-proBNP(!): 17,967  Dialysis dependent  0405 Creatinine(!): 5 49  Dialysis    0605 Delta 2hr hsTnI: -28       I, Lexus Salazar MD, saw and evaluated the patient  All available labs and X-rays were ordered by me or the resident and have been reviewed by myself  I discussed the patient with the resident / non-physician and agree with the resident's / non-physician practitioner's findings and plan as documented in the resident's / non-physician practicitioner's note, except where noted  At this point, I agree with the current assessment done in the ED  I was present during key portions of all procedures performed unless otherwise stated  Nursing Triage:     Chief Complaint   Patient presents with   • Chest Pain     Pt c/o chest pain  Pt wearing life vest but does not know why  Hx  recent heart surgery, dialysis pt  HPI:   This is a 58 y o  male presenting for evaluation of chest pain  The patient was recently in the hospital a week ago, had catheterization done with stent deployment    The patient is currently in a LifeVest but does not know why  Alba Portillo he had dialysis at about 5 PM   Since then until now he has had this continuous chest pain, not exertional   Denies any associate fevers chills nausea vomiting shortness of breath  No belly pain  ASSESSMENT + PLAN:   1  Chest pain:  - Given patient's concerns, will do a cardiac workup  - Will do an EKG for arrythmia, strain; troponin for same as per protocol for evaluation of ACS  - CBC for anemia; CMP for kidney function and electrolytes  - Will check CXR for pneumonia, PTX, fluid overload  - Will do POCUS Cardiac to evaluate for pericardial effusion    - Disposition per workup  HEART score:  History 0=Slightly or non-suspicious   ECG 2=Significant ST-depression   Age 1= > 45 - <65 years   Risk Factors 2= > 3 risk factors, or history of atherosclerotic disease   Troponin 2=Greater than or equal to 36 ng/L   Total 7     Physical:   Pertinent: Left brachium has fistula, palpable thrill  No overlying color change, no bleeding  General: VS reviewed  Appears in NAD  awake, alert  Well-nourished, well-developed  Appears stated age  Speaking normally in full sentences  Head: Normocephalic, atraumatic  Eyes: EOM-I  No diplopia  No hyphema  No subconjunctival hemorrhages  Symmetrical lids  ENT: Atraumatic external nose and ears  MMM  No malocclusion  No stridor  Normal phonation  No drooling  Normal swallowing  Neck: No JVD  CV: No pallor noted  Lungs:   No tachypnea  No respiratory distress  Abd: soft nt nd no rebound/guarding  MSK:   FROM spontaneously  Skin: Dry, intact  Neuro: Awake, alert, GCS15, CN II-XII grossly intact  Motor grossly intact  Psychiatric/Behavioral: interacting normally; appropriate mood/affect      Exam: deferred    Vitals:    02/11/23 0213 02/11/23 0215 02/11/23 0435   BP: 119/59  109/53   BP Location: Right arm  Right arm   Pulse: 74  64   Resp: 18  18   Temp:  98 1 °F (36 7 °C) TempSrc:  Oral    SpO2: 99%  97%     - There are no obvious limitations to social determinants of care  - Nursing note reviewed  - Vitals reviewed  - Orders placed by myself and/or advanced practitioner / resident     - Previous chart was reviewed  - No language barrier    - History obtained from wife, patient  - There are no limitations to the history obtained:     Past Medical:    has a past medical history of Cerebrovascular accident (CVA) due to thrombosis of left middle cerebral artery ([facility] [address] ) (7/29/2018), Chronic kidney disease, Diabetes mellitus ([facility] [address] ), GERD (gastroesophageal reflux disease), Hypercholesteremia, Hyperlipidemia, Hypertension, Infectious viral hepatitis, Neuropathy, Obesity, [facility]omyel[facility] ), PVC's (premature ventricular contractions), Stroke ([facility] ), and TIA (transient ischemic attack) (10/28/2018)  Past Surgical:    has a past surgical history that includes Rotator cuff repair (Right); Abdominal surgery; Other surgical history; Cholecystectomy; Toe amputation (Right, 10/28/2016); pr esophagogastroduodenoscopy transoral diagnostic (N/A, 9/27/2016); pr laparoscopy surg cholecystectomy (N/A, 2/29/2016); Cystoscopy; Colonoscopy; Cardiac catheterization (N/A, 5/2/2022); Cardiac catheterization (N/A, 5/2/2022); Cardiac catheterization (2/1/2023); Cardiac catheterization (N/A, 2/1/2023); Cardiac catheterization (N/A, 2/1/2023); and Cardiac catheterization (N/A, 2/1/2023)      Social:     Social History     Substance and Sexual Activity   Alcohol Use Not Currently     Social History     Tobacco Use   Smoking Status Never   Smokeless Tobacco Never     Social History     Substance and Sexual Activity   Drug Use No       Echo:   Results for orders placed during the hospital encounter of 10/05/20    Echo complete with contrast if indicated    Narrative  [facility] [address], [address] [facility]  [phone]    Transthoracic Echocardiogram  2D, M-mode, Doppler, and Color Doppler    Study date:  06-Oct-2020    Patient: Didier Ramirez  MR number: [id number]  Account number: [de-identified]  : 1960  Age: 61 years  Gender: Male  Status: Inpatient  Location: Bedside  Height: 70 in  Weight: 239 6 lb  BP: 165/ 80 mmHg    Indications: Shortness of breath  Diagnoses: R06 02 - Shortness of breath    Sonographer:  Saqib Cooper RDCS  Primary Physician:  Ken Marion DO  Referring Physician:  Duke Gilmore MD  Group:  [facility]  Interpreting Physician:  Breonna Felton MD    SUMMARY    LEFT VENTRICLE:  Systolic function was normal by visual assessment  Ejection fraction was estimated to be 60 %  There were no regional wall motion abnormalities  Wall thickness was moderately increased  Features were consistent with a pseudonormal left ventricular filling pattern, with concomitant abnormal relaxation and increased filling pressure (grade 2 diastolic dysfunction)  LEFT ATRIUM:  The atrium was mildly dilated  RIGHT ATRIUM:  The atrium was mildly dilated  MITRAL VALVE:  There was moderate annular calcification  There was mild regurgitation  AORTIC VALVE:  The valve was trileaflet  Leaflets exhibited normal thickness, moderate calcification, and moderately reduced cuspal separation  Transaortic velocity was increased due to valvular stenosis  There was mild to moderate stenosis  There was mild regurgitation  TRICUSPID VALVE:  There was trace regurgitation  PULMONIC VALVE:  There was mild regurgitation  HISTORY: PRIOR HISTORY: DM2  CKD4  Hypertension  CVA  GERD  Dementia  PROCEDURE: The procedure was performed at the bedside  This was a routine study  The transthoracic approach was used  The study included complete 2D imaging, M-mode, complete spectral Doppler, and color Doppler  The heart rate was 98 bpm,  at the start of the study  Image quality was adequate      LEFT VENTRICLE: Size was normal  Systolic function was normal by visual assessment  Ejection fraction was estimated to be 60 %  There were no regional wall motion abnormalities  Wall thickness was moderately increased  DOPPLER: The ratio  of early ventricular filling to atrial contraction velocities was within the normal range  Features were consistent with a pseudonormal left ventricular filling pattern, with concomitant abnormal relaxation and increased filling pressure  (grade 2 diastolic dysfunction)  RIGHT VENTRICLE: The size was normal  Systolic function was normal  DOPPLER: Systolic pressure was not estimated  LEFT ATRIUM: The atrium was mildly dilated  RIGHT ATRIUM: The atrium was mildly dilated  MITRAL VALVE: There was moderate annular calcification  Valve structure was normal  There was normal leaflet separation  DOPPLER: The transmitral velocity was within the normal range  There was no evidence for stenosis  There was mild  regurgitation  AORTIC VALVE: The valve was trileaflet  Leaflets exhibited normal thickness, moderate calcification, and moderately reduced cuspal separation  DOPPLER: Transaortic velocity was increased due to valvular stenosis  There was mild to moderate  stenosis  There was mild regurgitation  TRICUSPID VALVE: The valve structure was normal  There was normal leaflet separation  DOPPLER: The transtricuspid velocity was within the normal range  There was no evidence for stenosis  There was trace regurgitation  PULMONIC VALVE: Leaflets exhibited normal thickness, no calcification, and normal cuspal separation  DOPPLER: The transpulmonic velocity was within the normal range  There was mild regurgitation  PERICARDIUM: There was no pericardial effusion  AORTA: The root exhibited normal size  SYSTEMIC VEINS: IVC: The inferior vena cava was normal in size and course   Respirophasic changes were normal     SYSTEM MEASUREMENT TABLES    2D  %FS: 36 09 %  Ao Diam: 3 8 cm  EDV(Teich): 182 77 ml  EF(Teich): 64 8 %  ESV(Teich): 64 33 ml  IVSd: 1 57 cm  LA Area: 24 31 cm2  LA Diam: 4 63 cm  LVEDV MOD A4C: 192 34 ml  LVEF MOD A4C: 65 32 %  LVESV MOD A4C: 66 7 ml  LVIDd: 6 04 cm  LVIDs: 3 86 cm  LVLd A4C: 9 02 cm  LVLs A4C: 7 15 cm  LVOT Diam: 2 21 cm  LVPWd: 1 61 cm  RA Area: 17 56 cm2  RVIDd: 4 cm  SV MOD A4C: 125 64 ml  SV(Teich): 118 44 ml    CW  AV Env  Ti: 330 16 ms  AV MaxP 64 mmHg  AV VTI: 57 9 cm  AV Vmax: 2 58 m/s  AV Vmean: 1 76 m/s  AV meanP 38 mmHg  TR MaxP 37 mmHg  TR Vmax: 2 71 m/s    MM  TAPSE: 1 84 cm    PW  FATUMA (VTI): 1 27 cm2  FATUMA Vmax: 1 42 cm2  AVAI (VTI): 0 cm2/m2  AVAI Vmax: 0 cm2/m2  E' Sept: 0 07 m/s  E/E' Sept: 12 69  LVOT Env  Ti: 335 87 ms  LVOT VTI: 19 11 cm  LVOT Vmax: 0 95 m/s  LVOT Vmean: 0 57 m/s  LVOT maxPG: 3 63 mmHg  LVOT meanP 61 mmHg  LVSI Dopp: 32 42 ml/m2  LVSV Dopp: 73 27 ml  MV A [place]: 0 64 m/s  MV Dec [place]: 4 7 m/s2  MV DecT: 189 67 ms  MV E Carlin: 0 89 m/s  MV E/A Ratio: 1 38  MV PHT: 55 01 ms  MVA By PHT: 4 cm2    [address] Accredited Echocardiography Laboratory    Prepared and electronically signed by    Maxx Motta MD  Signed 06-Oct-2020 16:31:49    No results found for this or any previous visit  Cath:    No results found for this or any previous visit        Code Status: Prior  Advance Directive and Living Will:      Power of :    POLST:    Medications - No data to display  XR chest portable    (Results Pending)     Orders Placed This Encounter   Procedures   • POC Cardiac US   • POC Cardiac US   • ED ECG Documentation Only   • XR chest portable   • CBC and differential   • Comprehensive metabolic panel   • HS Troponin 0hr (reflex protocol)   • NT-BNP PRO-[facility],[place] campuses only   • HS Troponin I 2hr   • HS Troponin I 4hr   • Ambulatory Referral to Cardiology   • ECG 12 lead     Labs Reviewed   CBC AND DIFFERENTIAL - Abnormal       Result Value Ref Range Status    WBC 7 21  4 31 - 10 16 Thousand/uL Final    RBC 3 50 (*) 3 88 - 5 62 Million/uL Final Hemoglobin 10 5 (*) 12 0 - 17 0 g/dL Final    Hematocrit 33 1 (*) 36 5 - 49 3 % Final    MCV 95  82 - 98 fL Final    MCH 30 0  26 8 - 34 3 pg Final    MCHC 31 7  31 4 - 37 4 g/dL Final    RDW 14 7  11 6 - 15 1 % Final    MPV 10 5  8 9 - 12 7 fL Final    Platelets 770  361 - 390 Thousands/uL Final    nRBC 0  /100 WBCs Final    Neutrophils Relative 67  43 - 75 % Final    Immat GRANS % 2  0 - 2 % Final    Lymphocytes Relative 19  14 - 44 % Final    Monocytes Relative 11  4 - 12 % Final    Eosinophils Relative 1  0 - 6 % Final    Basophils Relative 0  0 - 1 % Final    Neutrophils Absolute 4 73  1 85 - 7 62 Thousands/µL Final    Immature Grans Absolute 0 15  0 00 - 0 20 Thousand/uL Final    Lymphocytes Absolute 1 39  0 60 - 4 47 Thousands/µL Final    Monocytes Absolute 0 81  0 17 - 1 22 Thousand/µL Final    Eosinophils Absolute 0 10  0 00 - 0 61 Thousand/µL Final    Basophils Absolute 0 03  0 00 - 0 10 Thousands/µL Final   COMPREHENSIVE METABOLIC PANEL - Abnormal    Sodium 135  135 - 147 mmol/L Final    Potassium 3 9  3 5 - 5 3 mmol/L Final    Chloride 94 (*) 96 - 108 mmol/L Final    CO2 34 (*) 21 - 32 mmol/L Final    ANION GAP 7  4 - 13 mmol/L Final    BUN 29 (*) 5 - 25 mg/dL Final    Creatinine 5 49 (*) 0 60 - 1 30 mg/dL Final    Comment: Standardized to IDMS reference method    Glucose 90  65 - 140 mg/dL Final    Comment: If the patient is fasting, the ADA then defines impaired fasting glucose as > 100 mg/dL and diabetes as > or equal to 123 mg/dL  Specimen collection should occur prior to Sulfasalazine administration due to the potential for falsely depressed results  Specimen collection should occur prior to Sulfapyridine administration due to the potential for falsely elevated results  Calcium 9 7  8 3 - 10 1 mg/dL Final    AST 10  5 - 45 U/L Final    Comment: Specimen collection should occur prior to Sulfasalazine administration due to the potential for falsely depressed results       ALT 18  12 - 78 U/L Final Comment: Specimen collection should occur prior to Sulfasalazine and/or Sulfapyridine administration due to the potential for falsely depressed results  Alkaline Phosphatase 97  46 - 116 U/L Final    Total Protein 8 0  6 4 - 8 4 g/dL Final    Albumin 3 7  3 5 - 5 0 g/dL Final    Total Bilirubin 0 53  0 20 - 1 00 mg/dL Final    Comment: Use of this assay is not recommended for patients undergoing treatment with eltrombopag due to the potential for falsely elevated results  eGFR 10  ml/min/1 73sq m Final    Narrative:     [place] guidelines for Chronic Kidney Disease (CKD):   •  Stage 1 with normal or high GFR (GFR > 90 mL/min/1 73 square meters)  •  Stage 2 Mild CKD (GFR = 60-89 mL/min/1 73 square meters)  •  Stage 3A Moderate CKD (GFR = 45-59 mL/min/1 73 square meters)  •  Stage 3B Moderate CKD (GFR = 30-44 mL/min/1 73 square meters)  •  Stage 4 Severe CKD (GFR = 15-29 mL/min/1 73 square meters)  •  Stage 5 End Stage CKD (GFR <15 mL/min/1 73 square meters)  Note: GFR calculation is accurate only with a steady state creatinine   HS TROPONIN I 0HR - Abnormal    hs TnI 0hr 242 (*) "Refer to ACS Flowchart"- see link ng/L Final    Comment:                                              Initial (time 0) result  If >=50 ng/L, Myocardial injury suggested ;  Type of myocardial injury and treatment strategy  to be determined  If 5-49 ng/L, a delta result at 2 hours and or 4 hours will be needed to further evaluate  If <4 ng/L, and chest pain has been >3 hours since onset, patient may qualify for discharge based on the HEART score in the ED  If <5 ng/L and <3hours since onset of chest pain, a delta result at 2 hours will be needed to further evaluate  HS Troponin 99th Percentile URL of a Health Population=12 ng/L with a 95% Confidence Interval of 8-18 ng/L      Second Troponin (time 2 hours)  If calculated delta >= 20 ng/L,  Myocardial injury suggested ; Type of myocardial injury and treatment strategy to be determined  If 5-49 ng/L and the calculated delta is 5-19 ng/L, consult medical service for evaluation  Continue evaluation for ischemia on ecg and other possible etiology and repeat hs troponin at 4 hours  If delta is <5 ng/L at 2 hours, consider discharge based on risk stratification via the HEART score (if in ED), or JALIL risk score in IP/Observation  HS Troponin 99th Percentile URL of a Health Population=12 ng/L with a 95% Confidence Interval of 8-18 ng/L  NT-BNP PRO-[facility],[place] CAMPUSES ONLY - Abnormal    NT-proBNP 17,967 (*) <125 pg/mL Final   HS TROPONIN I 2HR - Abnormal    hs TnI 2hr 210 (*) "Refer to ACS Flowchart"- see link ng/L Final    Comment:                                              Initial (time 0) result  If >=50 ng/L, Myocardial injury suggested ;  Type of myocardial injury and treatment strategy  to be determined  If 5-49 ng/L, a delta result at 2 hours and or 4 hours will be needed to further evaluate  If <4 ng/L, and chest pain has been >3 hours since onset, patient may qualify for discharge based on the HEART score in the ED  If <5 ng/L and <3hours since onset of chest pain, a delta result at 2 hours will be needed to further evaluate  HS Troponin 99th Percentile URL of a Health Population=12 ng/L with a 95% Confidence Interval of 8-18 ng/L  Second Troponin (time 2 hours)  If calculated delta >= 20 ng/L,  Myocardial injury suggested ; Type of myocardial injury and treatment strategy to be determined  If 5-49 ng/L and the calculated delta is 5-19 ng/L, consult medical service for evaluation  Continue evaluation for ischemia on ecg and other possible etiology and repeat hs troponin at 4 hours  If delta is <5 ng/L at 2 hours, consider discharge based on risk stratification via the HEART score (if in ED), or JALIL risk score in IP/Observation      HS Troponin 99th Percentile URL of a Health Population=12 ng/L with a 95% Confidence Interval of 8-18 ng/L     Delta 2hr hsTnI -32  <20 ng/L Final   HS TROPONIN I 4HR     Time reflects when diagnosis was documented in both MDM as applicable and the Disposition within this note     Time User Action Codes Description Comment    2/11/2023  4:43 AM Reji Kimbrough Add [R07 9] Chest pain       ED Disposition     ED Disposition   Discharge    Condition   Stable    Date/Time   Sat Feb 11, 2023 5616    Ryder discharge to home/self care  Follow-up Information     Follow up With Specialties Details Why Contact Info Additional [address] Emergency Department Emergency Medicine Go to  If symptoms worsen [address] [id number]  [address] [facility], [address], [place], [place], [address], DO Internal Medicine Go to  If symptoms worsen [address]  [phone]           Discharge Medication List as of 2/11/2023  5:56 AM      CONTINUE these medications which have NOT CHANGED    Details   aspirin (ECOTRIN LOW STRENGTH) 81 mg EC tablet Take 81 mg by mouth daily Resume on 8/14  , Historical Med      atorvastatin (LIPITOR) 40 mg tablet TAKE 1 TABLET BY MOUTH EVERY DAY WITH DINNER, Normal      Blood Glucose Monitoring Suppl (True Metrix Meter) w/Device KIT Use to test blood sugars 3 times daily, Normal      Blood Pressure Monitoring (BLOOD PRESSURE CUFF) MISC Use to check blood pressure before taking blood pressure medication and 1 hour after and follow instructions provided in discharge instructions based on the readings  , Print      calcium acetate (CALPHRON) 667 mg 3 tablets 3x per day, Historical Med      Cholecalciferol (Vitamin D3) 1 25 MG (66610 UT) CAPS TAKE 1 CAPSULE BY MOUTH ONE TIME PER WEEK, Normal      divalproex sodium (DEPAKOTE SPRINKLE) 125 MG capsule Take 2 capsules (250 mg total) by mouth every 12 (twelve) hours, Starting Wed 1/25/2023, Normal      Insulin Pen Needle (BD Pen Needle Adina U/F) 32G X 4 MM MISC Use 4 times daily, Normal      Insulin Syringe-Needle U-100 (B-D INS SYRINGE 0 5CC/30GX1/2") 30G X 1/2" 0 5 ML MISC Inject once daily, Normal      !! Lancets Ultra Thin 30G MISC Use 3 times a day, Normal      metoprolol succinate (TOPROL-XL) 25 mg 24 hr tablet Take 1 tablet (25 mg total) by mouth daily Do not start before February 5, 2023 , Starting Sun 2/5/2023, Normal      !! ONETOUCH DELICA LANCETS 25W MISC by Does not apply route 3 (three) times a day, Starting Tue 11/27/2018, Normal      prasugrel (EFFIENT) tablet Take 1 tablet by mouth daily, Starting Tue 8/2/2022, Historical Med      True Metrix Blood Glucose Test test strip USE 1 EACH 3 (THREE) TIMES A DAY USE AS INSTRUCTED, Starting Tue 2/7/2023, Normal       !! - Potential duplicate medications found  Please discuss with provider  Prior to Admission Medications   Prescriptions Last Dose Informant Patient Reported? Taking? Blood Glucose Monitoring Suppl (True Metrix Meter) w/Device KIT   No No   Sig: Use to test blood sugars 3 times daily   Blood Pressure Monitoring (BLOOD PRESSURE CUFF) MISC   No No   Sig: Use to check blood pressure before taking blood pressure medication and 1 hour after and follow instructions provided in discharge instructions based on the readings     Cholecalciferol (Vitamin D3) 1 25 MG (27877 UT) CAPS   No No   Sig: TAKE 1 CAPSULE BY MOUTH ONE TIME PER WEEK   Insulin Pen Needle (BD Pen Needle Adina U/F) 32G X 4 MM MISC   No No   Sig: Use 4 times daily   Insulin Syringe-Needle U-100 (B-D INS SYRINGE 0 5CC/30GX1/2") 30G X 1/2" 0 5 ML MISC   No No   Sig: Inject once daily   Lancets Ultra Thin 30G MISC   No No   Sig: Use 3 times a day   ONETOUCH DELICA LANCETS 27T MISC   No No   Sig: by Does not apply route 3 (three) times a day   True Metrix Blood Glucose Test test strip   No No   Sig: USE 1 EACH 3 (THREE) TIMES A DAY USE AS INSTRUCTED   aspirin (ECOTRIN LOW STRENGTH) 81 mg EC tablet   Yes No   Sig: Take 81 mg by mouth daily Resume on 8/14     atorvastatin (LIPITOR) 40 mg tablet   No No   Sig: TAKE 1 TABLET BY MOUTH EVERY DAY WITH DINNER   calcium acetate (CALPHRON) 667 mg   Yes No   Sig: 3 tablets 3x per day   divalproex sodium (DEPAKOTE SPRINKLE) 125 MG capsule   No No   Sig: Take 2 capsules (250 mg total) by mouth every 12 (twelve) hours   metoprolol succinate (TOPROL-XL) 25 mg 24 hr tablet   No No   Sig: Take 1 tablet (25 mg total) by mouth daily Do not start before February 5, 2023  prasugrel (EFFIENT) tablet   Yes No   Sig: Take 1 tablet by mouth daily      Facility-Administered Medications: None     HEART Risk Score    Flowsheet Row Most Recent Value   Heart Score Risk Calculator    History 2 Filed at: 02/11/2023 0532   ECG 0 Filed at: 02/11/2023 0532   Age 1 Filed at: 02/11/2023 0532   Risk Factors 2 Filed at: 02/11/2023 0532   Troponin 2 Filed at: 02/11/2023 0532   HEART Score 7 Filed at: 02/11/2023 0532                         Portions of the record may have been created with voice recognition software  Occasional wrong word or "sound a like" substitutions may have occurred due to the inherent limitations of voice recognition software  Read the chart carefully and recognize, using context, where substitutions have occurred      Electronically signed by:  Arias Jimenes

## 2023-02-11 NOTE — ED PROVIDER NOTES
History  Chief Complaint   Patient presents with   • Chest Pain     Pt c/o chest pain  Pt wearing life vest but does not know why  Hx  recent heart surgery, dialysis pt  Patient is a 70-year-old male presenting for acute onset, substernal, nonradiating, nondiaphoretic, pressure-like chest pain that began while he was at dialysis at approximately 5 PM   Past medical history significant for diabetes, hyperlipidemia, hypertension, ESRD on dialysis, recent cardiac cath approximately 1 week ago for NSTEMI, aortic stenosis, obesity, LifeVest   Patient denies any lightheadedness, dizziness, shortness of breath, nausea/vomiting/diarrhea, fever/chills  Patient states chest pain is constant  Otherwise patient has no other symptoms  Pain does not radiate to back  On exam, patient is alert well-appearing, heart regular rate rhythm, lungs clear by auscultation, abdomen nontender with normoactive bowel sounds  Patient has left AV fistula that is patent with palpable thrill  Prior to Admission Medications   Prescriptions Last Dose Informant Patient Reported? Taking? Blood Glucose Monitoring Suppl (True Metrix Meter) w/Device KIT   No No   Sig: Use to test blood sugars 3 times daily   Blood Pressure Monitoring (BLOOD PRESSURE CUFF) MISC   No No   Sig: Use to check blood pressure before taking blood pressure medication and 1 hour after and follow instructions provided in discharge instructions based on the readings     Cholecalciferol (Vitamin D3) 1 25 MG (43537 UT) CAPS   No No   Sig: TAKE 1 CAPSULE BY MOUTH ONE TIME PER WEEK   Insulin Pen Needle (BD Pen Needle Adina U/F) 32G X 4 MM MISC   No No   Sig: Use 4 times daily   Insulin Syringe-Needle U-100 (B-D INS SYRINGE 0 5CC/30GX1/2") 30G X 1/2" 0 5 ML MISC   No No   Sig: Inject once daily   Lancets Ultra Thin 30G MISC   No No   Sig: Use 3 times a day   ONETOUCH DELICA LANCETS 39E MISC   No No   Sig: by Does not apply route 3 (three) times a day   True Metrix Blood Glucose Test test strip   No No   Sig: USE 1 EACH 3 (THREE) TIMES A DAY USE AS INSTRUCTED   aspirin (ECOTRIN LOW STRENGTH) 81 mg EC tablet   Yes No   Sig: Take 81 mg by mouth daily Resume on 8/14     atorvastatin (LIPITOR) 40 mg tablet   No No   Sig: TAKE 1 TABLET BY MOUTH EVERY DAY WITH DINNER   calcium acetate (CALPHRON) 667 mg   Yes No   Sig: 3 tablets 3x per day   divalproex sodium (DEPAKOTE SPRINKLE) 125 MG capsule   No No   Sig: Take 2 capsules (250 mg total) by mouth every 12 (twelve) hours   metoprolol succinate (TOPROL-XL) 25 mg 24 hr tablet   No No   Sig: Take 1 tablet (25 mg total) by mouth daily Do not start before February 5, 2023  prasugrel (EFFIENT) tablet   Yes No   Sig: Take 1 tablet by mouth daily      Facility-Administered Medications: None       Past Medical History:   Diagnosis Date   • Cerebrovascular accident (CVA) due to thrombosis of left middle cerebral artery ([facility] ) 7/29/2018   • Chronic kidney disease    • Diabetes mellitus ([facility] )    • GERD (gastroesophageal reflux disease)    • Hypercholesteremia    • Hyperlipidemia    • Hypertension    • 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   • Neuropathy    • Obesity    • Osteomyelitis ([facility] )     last assessed 11/4/16   • PVC's (premature ventricular contractions)     sees cardiology Dr Judy camargo   • Stroke [facility])     last weeof July 2018 [address] [employer]   • TIA (transient ischemic attack) 10/28/2018       Past Surgical History:   Procedure Laterality Date   • ABDOMINAL SURGERY     • CARDIAC CATHETERIZATION N/A 5/2/2022    Procedure: Cardiac Coronary Angiogram;  Surgeon: Giacomo Anthony MD;  [facility]ARD[facility]; Service: Cardiology   • CARDIAC CATHETERIZATION N/A 5/2/2022    Procedure: Cardiac pci;  Surgeon: Giacomo Anthony MD;  [facility] C[facility]; Service: Cardiology   • CARDIAC CATHETERIZATION  2/1/2023    Procedure: Cardiac catheterization;  Surgeon: Giacomo Anthony MD;  Location: [facility] CARDIAC [facility];   Service: Cardiology   • CARDIAC CATHETERIZATION N/A 2/1/2023    Procedure: Cardiac pci;  Surgeon: Timi Kaminski MD;  Location: [facility] CARDIAC [facility]; Service: Cardiology   • CARDIAC CATHETERIZATION N/A 2/1/2023    Procedure: Cardiac Coronary Angiogram;  Surgeon: Timi Kaminski MD;  Location: [facility] CARDIAC [facility]; Service: Cardiology   • CARDIAC CATHETERIZATION N/A 2/1/2023    Procedure: Cardiac other-IVUS;  Surgeon: Timi Kaminski MD;  Location: [facility] CARDIAC [facility]; Service: Cardiology   • CHOLECYSTECTOMY      Percutaneous   • COLONOSCOPY     • CYSTOSCOPY     • OTHER SURGICAL HISTORY      "stimulator to control bowel movements"   • PA ESOPHAGOGASTRODUODENOSCOPY TRANSORAL DIAGNOSTIC N/A 9/27/2016    Procedure: ESOPHAGOGASTRODUODENOSCOPY (EGD); Surgeon: Stefani Espinal MD;  Location: [facility] GI LAB; Service: Gastroenterology   • PA LAPAROSCOPY SURG CHOLECYSTECTOMY N/A 2/29/2016    Procedure: LAPAROSCOPIC CHOLECYSTECTOMY ;  Surgeon: Thai Ramirez DO;  Location: [facility] Main OR;  Service: General   • ROTATOR CUFF REPAIR Right    • TOE AMPUTATION Right 10/28/2016    Procedure: 3RD TOE AMPUTATION ;  Surgeon: Nelda Abdullahi DPM;  Location: [facility] Main OR;  Service:        Family History   Problem Relation Age of Onset   • Leukemia Mother    • Liver disease Mother    • Lung cancer Mother         heavy smoker - 3 ppd   • Heart disease Father    • Liver disease Father    • Multiple myeloma Sister    • Breast cancer Sister    • Urolithiasis Family    • Alcohol abuse Neg Hx    • Depression Neg Hx    • Drug abuse Neg Hx    • Substance Abuse Neg Hx    • Mental illness Neg Hx      I have reviewed and agree with the history as documented      E-Cigarette/Vaping   • E-Cigarette Use Never User      E-Cigarette/Vaping Substances   • Nicotine No    • THC No    • CBD No    • Flavoring No    • Other No    • Unknown No      Social History     Tobacco Use   • Smoking status: Never   • Smokeless tobacco: Never   Vaping Use   • Vaping Use: Never used Substance Use Topics   • Alcohol use: Not Currently   • Drug use: No        Review of Systems   Constitutional: Negative  HENT: Negative  Eyes: Negative  Respiratory: Negative  Cardiovascular: Positive for chest pain  Gastrointestinal: Negative  Endocrine: Negative  Genitourinary: Negative  Musculoskeletal: Negative  Skin: Negative  Allergic/Immunologic: Negative  Neurological: Negative  Hematological: Negative  Psychiatric/Behavioral: Negative  Physical Exam  ED Triage Vitals   Temperature Pulse Respirations Blood Pressure SpO2   02/11/23 0215 02/11/23 0213 02/11/23 0213 02/11/23 0213 02/11/23 0213   98 1 °F (36 7 °C) 74 18 119/59 99 %      Temp Source Heart Rate Source Patient Position - Orthostatic VS BP Location FiO2 (%)   02/11/23 0215 02/11/23 0213 02/11/23 0213 02/11/23 0213 --   Oral Monitor Lying Right arm       Pain Score       02/11/23 0435       No Pain             Orthostatic Vital Signs  Vitals:    02/11/23 0213 02/11/23 0435   BP: 119/59 109/53   Pulse: 74 64   Patient Position - Orthostatic VS: Lying Lying       Physical Exam  Vitals and nursing note reviewed  Constitutional:       Appearance: He is well-developed  He is obese  HENT:      Head: Normocephalic and atraumatic  Eyes:      Extraocular Movements: Extraocular movements intact  Pupils: Pupils are equal, round, and reactive to light  Cardiovascular:      Rate and Rhythm: Normal rate and regular rhythm  Heart sounds: Normal heart sounds  Pulmonary:      Effort: Pulmonary effort is normal       Breath sounds: Normal breath sounds  Abdominal:      General: Bowel sounds are normal       Palpations: Abdomen is soft  Musculoskeletal:         General: Normal range of motion  Cervical back: Normal range of motion and neck supple  Skin:     General: Skin is warm and dry  Capillary Refill: Capillary refill takes less than 2 seconds     Neurological:      General: No focal deficit present  Mental Status: He is alert and oriented to person, place, and time     Psychiatric:         Mood and Affect: Mood normal          Behavior: Behavior normal          ED Medications  Medications - No data to display    Diagnostic Studies  Results Reviewed     Procedure Component Value Units Date/Time    HS Troponin I 2hr [[id number]]  (Abnormal) Collected: 02/11/23 0433    Lab Status: Final result Specimen: Blood from Arm, Right Updated: 02/11/23 0521     hs TnI 2hr 210 ng/L      Delta 2hr hsTnI -32 ng/L     HS Troponin I 4hr [[id number]]     Lab Status: No result Specimen: Blood     NT-BNP PRO-[facility],[place] campuses only [[id number]]  (Abnormal) Collected: 02/11/23 0238    Lab Status: Final result Specimen: Blood from Arm, Right Updated: 02/11/23 0403     NT-proBNP 17,967 pg/mL     Comprehensive metabolic panel [[id number]]  (Abnormal) Collected: 02/11/23 0238    Lab Status: Final result Specimen: Blood from Arm, Right Updated: 02/11/23 0403     Sodium 135 mmol/L      Potassium 3 9 mmol/L      Chloride 94 mmol/L      CO2 34 mmol/L      ANION GAP 7 mmol/L      BUN 29 mg/dL      Creatinine 5 49 mg/dL      Glucose 90 mg/dL      Calcium 9 7 mg/dL      AST 10 U/L      ALT 18 U/L      Alkaline Phosphatase 97 U/L      Total Protein 8 0 g/dL      Albumin 3 7 g/dL      Total Bilirubin 0 53 mg/dL      eGFR 10 ml/min/1 73sq m     Narrative:      Romel guidelines for Chronic Kidney Disease (CKD):   •  Stage 1 with normal or high GFR (GFR > 90 mL/min/1 73 square meters)  •  Stage 2 Mild CKD (GFR = 60-89 mL/min/1 73 square meters)  •  Stage 3A Moderate CKD (GFR = 45-59 mL/min/1 73 square meters)  •  Stage 3B Moderate CKD (GFR = 30-44 mL/min/1 73 square meters)  •  Stage 4 Severe CKD (GFR = 15-29 mL/min/1 73 square meters)  •  Stage 5 End Stage CKD (GFR <15 mL/min/1 73 square meters)  Note: GFR calculation is accurate only with a steady state creatinine    HS Troponin 0hr (reflex protocol) [[id number]]  (Abnormal) Collected: 02/11/23 0238    Lab Status: Final result Specimen: Blood from Arm, Right Updated: 02/11/23 0327     hs TnI 0hr 242 ng/L     CBC and differential [[id number]]  (Abnormal) Collected: 02/11/23 0238    Lab Status: Final result Specimen: Blood from Arm, Right Updated: 02/11/23 0245     WBC 7 21 Thousand/uL      RBC 3 50 Million/uL      Hemoglobin 10 5 g/dL      Hematocrit 33 1 %      MCV 95 fL      MCH 30 0 pg      MCHC 31 7 g/dL      RDW 14 7 %      MPV 10 5 fL      Platelets 279 Thousands/uL      nRBC 0 /100 WBCs      Neutrophils Relative 67 %      Immat GRANS % 2 %      Lymphocytes Relative 19 %      Monocytes Relative 11 %      Eosinophils Relative 1 %      Basophils Relative 0 %      Neutrophils Absolute 4 73 Thousands/µL      Immature Grans Absolute 0 15 Thousand/uL      Lymphocytes Absolute 1 39 Thousands/µL      Monocytes Absolute 0 81 Thousand/µL      Eosinophils Absolute 0 10 Thousand/µL      Basophils Absolute 0 03 Thousands/µL                  XR chest portable    (Results Pending)         Procedures  POC Cardiac US    Date/Time: 2/11/2023 3:25 AM  Performed by: Felicia Mckeon MD  Authorized by: Felicia Mckeon MD     Patient location:  ED  Procedure performed by consultant: Dr Isaías Sarah      Procedure details:     Exam Type:  Diagnostic    Indications: chest pain      Assessment / Evaluation for: cardiac function and pericardial effusion      Exam Type: initial exam      Image quality: limited diagnostic      Image availability:  Images available in PACS, still images obtained and video obtained  Patient Details:     Cardiac Rhythm:  Regular    Mechanical ventilation: No    Cardiac findings:     Echo technique: limited 2D      Views obtained: parasternal long axis, parasternal short axis, subcostal and apical      Pericardial effusion: absent      Tamponade physiology: absent      Wall motion: hypodynamic      LV systolic function: depressed      RV dilation: none IVC findings:     IVC Size: indeterminate      ECG 12 Lead Documentation Only    Date/Time: 2/11/2023 4:39 AM  Performed by: Sunny Dobbs MD  Authorized by: Sunny Dobbs MD     Indications / Diagnosis:  Chest pain  ECG reviewed by me, the ED Provider: yes    Patient location:  ED  Previous ECG:     Comparison to cardiac monitor: Yes    Interpretation:     Interpretation: normal    Rate:     ECG rate:  70    ECG rate assessment: normal    Rhythm:     Rhythm: sinus rhythm    Ectopy:     Ectopy: none    QRS:     QRS axis:  Right    QRS intervals: Wide  Conduction:     Conduction: abnormal      Abnormal conduction: complete RBBB    ST segments:     ST segments:  Normal  T waves:     T waves: normal            ED Course  ED Course as of 02/11/23 0559   Sat Feb 11, 2023   8476 Patient states chest pain has resolved, despite heart score of 7, patient states he would like to leave and not remain in the hospital   3 usual decision making, amatory referral to cardiology placed and plan to have close follow-up with strict return precautions for any further chest pain  Patient understands and agrees  Plan for discharge  HEART Risk Score    Flowsheet Row Most Recent Value   Heart Score Risk Calculator    History 2 Filed at: 02/11/2023 0532   ECG 0 Filed at: 02/11/2023 0532   Age 1 Filed at: 02/11/2023 0532   Risk Factors 2 Filed at: 02/11/2023 0532   Troponin 2 Filed at: 02/11/2023 0532   HEART Score 7 Filed at: 02/11/2023 0532                      SBIRT 22yo+    Flowsheet Row Most Recent Value   SBIRT (23 yo +)    In order to provide better care to our patients, we are screening all of our patients for alcohol and drug use  Would it be okay to ask you these screening questions? Yes Filed at: 02/11/2023 0553   Initial Alcohol Screen: US AUDIT-C     1  How often do you have a drink containing alcohol? 0 Filed at: 02/11/2023 0553   2   How many drinks containing alcohol do you have on a typical day you are drinking? 0 Filed at: 02/11/2023 0553   3a  Male UNDER 65: How often do you have five or more drinks on one occasion? 0 Filed at: 02/11/2023 0553   3b  FEMALE Any Age, or MALE 65+: How often do you have 4 or more drinks on one occassion? 0 Filed at: 02/11/2023 0553   Audit-C Score 0 Filed at: 02/11/2023 4974   XIN: How many times in the past year have you    Used an illegal drug or used a prescription medication for non-medical reasons? Never Filed at: 02/11/2023 5057                Medical Decision Making  Patient is a 41-year-old male presenting for acute onset chest pain  DDx: ACS, arrhythmia, dissection, Dressler syndrome, pericarditis, pericardial effusion, CHF  Based on patient presentation, past medical history, and physical exam, primary concern is for cardiac rule out  Will obtain full cardiac work-up, EKG, echo, chest x-ray  Based on patient past medical history and recent cardiac catheterization, will most likely plan admit for observation  Dispo pending lab results and cardiac work-up  Point-of-care ultrasound was unremarkable for any pericardial effusion, lower concern for pericarditis, CHF based on clear bilateral lung sounds and no lower extremity edema  Amount and/or Complexity of Data Reviewed  Labs: ordered  Radiology: ordered  Disposition  Final diagnoses:   Chest pain     Time reflects when diagnosis was documented in both MDM as applicable and the Disposition within this note     Time User Action Codes Description Comment    2/11/2023  4:43 AM Nicole Phillip Add [R07 9] Chest pain       ED Disposition     ED Disposition   Discharge    Condition   Stable    Date/Time   Sat Feb 11, 2023  5:53 AM    Comment   [address] discharge to home/self care                 Follow-up Information     Follow up With Specialties Details Why Contact Info Additional [address] Emergency Department Emergency Medicine Go to  If symptoms worsen [address] [facility] [address]  [address] [facility] [address] Emergency [address], [address], [place], [place], [address], DO Internal Medicine Go to  If symptoms worsen [address] [address]  [phone]             Patient's Medications   Discharge Prescriptions    No medications on file         PDMP Review       Value Time User    PDMP Reviewed  Yes 11/12/2022  6:19 AM Ene De La O MD           ED Provider  Attending physically available and evaluated Suki Resendez GUERLINE managed the patient along with the ED Attending      Electronically Signed by         Alycia Munroe MD  02/11/23 8653

## 2023-02-13 ENCOUNTER — CLINICAL SUPPORT (OUTPATIENT)
Dept: CARDIAC REHAB | Facility: CLINIC | Age: 63
End: 2023-02-13

## 2023-02-13 DIAGNOSIS — Z95.5 S/P CORONARY ARTERY STENT PLACEMENT: ICD-10-CM

## 2023-02-13 DIAGNOSIS — I21.4 TYPE 1 NON-ST ELEVATION MYOCARDIAL INFARCTION (NSTEMI) (HCC): ICD-10-CM

## 2023-02-13 NOTE — PROGRESS NOTES
Jessa Joseph        Dear Dr Alanna Iqbal,    Emotional well-being and depression is addressed in the cardiac  rehab evaluation  To assess the severity of depression, patients are given the PHQ-9 Depression Questionnaire  This is to inform you that your patient Karin Locke scored a 25 which is interpreted as 15-19 = Moderately Severe Depression  Your patient was provided contact information for [facility] and has been encouraged to enroll in [place] [employer]  A repeat PHQ -9 will be administered in 30 days to assess improvement  Thank you for your support of cardiopulmonary rehab      Sincerely,      Joyce Montes De Oca, MS, CEP

## 2023-02-13 NOTE — PROGRESS NOTES
Cardiac Rehabilitation Plan of Care   Initial Care Plan          Today's date: 2023   # of Exercise Sessions Completed: 1- initial eval  Patient name: Abbie Guerra      : 1960  Age: 58 y o  MRN: [id number]  Referring Physician: Shiva Medeiros MD  Cardiologist: Swapna Tee MD  Provider: Juan Ahuja  Clinician: Bonnie Guerra MS, CEP     Dx:   Encounter Diagnoses   Name Primary? • S/P coronary artery stent placement    • Type 1 non-ST elevation myocardial infarction (NSTEMI) s/p ADE to in-stent restenosis of mid LAD      Date of onset: 2023      SUMMARY OF PROGRESS:  Today is Rupal Ohara initial evaluation to begin Cardiac Rehab post S/P coronary artery stent placement  The patient does not currently follow a formal exercise program at home  He has resumed light ADLs reporting weakness and fatigue  Depression screening using the PHQ-9 interprets the patient's score of  18 as 15-19 = Moderately Severe Depression  LEYLA-7 screening tool for anxiety suggests 16  10-14 = Moderate anxiety  When addressed, the patient admits to having depression/anxiety  Patient reports excellent social/emotional support from wife  Information to utilize [employer] was provided as well as contact information for counseling through [employer]  PHQ-9 score will be reassessed in 30 days due to an initial score revealing concern for depression  They rate stress 4/10 with the following stressors: medical history and hospitalizations  Stress management will be reviewed  The patient is a non-smoker  Patient admits to 100% medication compliance  The patient completed an initial submaximal NuStep ETT  The patient completed 3 minutes of stage 1 (1 90 METs) with test termination of RPE 6  Resting  /64 with Normal response to exercise reaching 126/72  Blood Pressure will be monitored throughout the program and cardiologist will be notified of elevated trends    Patient reported symptoms with exercise including fatigue, dizziness    Telemetry revealed NSR  We discussed current dietary habits and goals of heart healthy eating for weight loss  The patient has T2D  Patient's personal goals include: improve heart health, increase strength, weight loss, start home exercise program, and start using [employer] program  The patient's CAD risk factors include:  inactivity, stress, obesity/overweight, hypertension, hyperlipidemia and diabetes  His education will focus on lifestyle modification/education specific to His needs  Patient will attend group education classes on heart healthy eating, reading food labels, stress management, risk factor reduction, understanding heart disease and common heart medications  Patient will attend 35 monitored exercise sessions, 3x/wk for 12-18 weeks beginning 2/15/2023       Medication compliance: Yes   Comments: Pt reports to be compliant with medications  Fall Risk: High   Comments: unsteady gait, needs to hold onto something while walking    EKG Interpretation: NSR      EXERCISE ASSESSMENT and PLAN    Exercise Prescription:      Frequency: 3 days/week   Supplement with home exercise 2+ days/wk as tolerated       Minutes: 30         METS: 1 5-2 5        HR: 68-89   RPE: 4-6         Modalities: NuStep, Recumbent bike and Room walking      30 Day Goals for Exercise Progression:    Frequency: 3 days/week of cardiac rehab       Supplement with home exercise 2+ days/wk as tolerated    Minutes: 30-40                              >150 mins/wk of moderate intensity exercise   METS: 2 0-3 0   HR: 65-82   RPE: 4-6   Modalities: NuStep, Recumbent bike and Room walking    Strength trainin-3 days / week  12-15 repetitions  1-2 sets per modality   Will be added following 2-3 weeks of monitored exercise sessions   Modalities: Lateral Raise, Arm Curl, Sit to Stands, [employer] and Shoulder Shrugs    Home Exercise: none    Goals: 10% improvement in functional capacity - based on max METs achieved in fitness assessment, improved DASI score by 10%, Increase in exercise capacity by 40% - based on peak METs tolerated in cardiac rehab exercise session, Exercise 5 days/wk, >150mins/wk of moderate intensity exercise, Resume ADLs with increased strength, Attend Rehab regularly, Decrease sitting time and start a home exercise program    Progression Toward Goals:  Reviewed Pt goals and determined plan of care, Patient will move around more when at home in the next 30 days    Education: benefit of exercise for CAD risk factors and RPE scale   Plan:education on home exercise guidelines, home exercise 30+ mins 2 days opposite CR and Education class: Risk Factors for Heart Disease  Readiness to change: Pre-Contemplation:   (Not yet acknowledging that there is a problem behavior that needs to change)      NUTRITION ASSESSMENT AND PLAN    Weight control:    Starting weight: 220   Current weight:     Waist circumference:    Startin   Current:      Diabetes: T2D    Lipid management: Discussed diet and lipid management    Goals:reduced waist circumference <40 inches (M), 2 5-5%  wt loss, choose lean meat (93-95%), increase intake of meatless meals, use low fat dairy, reduce cheese intake or use reduced-fat, eat 3 or more servings of whole grains a day, Eat 4-5 cups of fruits and vegetables daily, choose healthy snacks: light popcorn, plain pretzels, Increase intake of nuts and seeds and daily saturated fat intake <7%/13g    Measurable goals were based Rate Your Plate Dietary Self-Assessment  These are the areas in which the patient could score higher on the assessment  Goals include recommendations for a heart healthy diet based on American Heart Association      Progression Toward Goals: Reviewed Pt goals and determined plan of care    Education: heart healthy eating  low sodium diet  hydration  wt  loss   Plan: Education class: Reading Food Labels, Education Class: Heart Healthy Eating, switch to low fat cheeses, replace refined grain bread with whole grain bread, replace unhealthy snacks with fruits & vegs, avoid processed foods and keep added daily sugar <25g/day  Readiness to change: Preparation:  (Getting ready to change)       PSYCHOSOCIAL ASSESSMENT AND PLAN    Emotional:  Depression assessment:  PHQ-9 = 18  15-19 = Moderately Severe Depression            Anxiety measure:  LEYLA-7 = 16  10-14 = Moderate anxiety  Self-reported stress level:  4  Social support: Very Good    Goals:  Reduce perceived stress to 1-3/10, improved [facility] QOL < 27, PHQ-9 - reduced severity by one level, Feelings in [facility] Score < 3, Physical Fitness in [facility] Score < 3, Social Support in [facility] Score < 3, Daily Activity in [facility] Score < 3, Social Activities in [facility] Score < 3, Pain in [facility] Score < 3, Overall Health in [facility] Score < 3, Quality of Life in [facility] Score < 3 , Change in Health in [place] Score < 3 , Increased interest in doing things and improved positive thoughts of well being    Progression Toward Goals: Reviewed Pt goals and determined plan of care, Patient will start using [employer] program in the next 30 days    Education: signs/sxs of depression, benefits of a positive support system and stress management techniques  Plan: Class: Stress and Your Health, Class: Relaxation, PHQ-9 >5 will refer to MD, Refer to [facility], Practice relaxation techniques, Enjoy a hobby and Keep a positive mindset  Readiness to change: Pre-Contemplation:   (Not yet acknowledging that there is a problem behavior that needs to change)      OTHER CORE COMPONENTS     Tobacco:   Social History     Tobacco Use   Smoking Status Never   Smokeless Tobacco Never       Tobacco Use Intervention:   N/A:  Patient is a non-smoker     Anginal Symptoms:  chest pressure, excessive fatigue and lightheadedness   NTG use: No prescription    Blood pressure:    Restin/64   Exercise: 126/72    Goals: consistent BP < 130/80, reduced dietary sodium <2300mg, moderate intensity exercise >150 mins/wk, medication compliance and reduced angina     Progression Toward Goals: Reviewed Pt goals and determined plan of care    Education:  understanding high blood pressure and it's relationship to CAD and low sodium diet and HTN  Plan: Class: Understanding Heart Disease, Class: Common Heart Medications, Avoid Processed foods, engage in regular exercise and check labels for sodium content  Readiness to change: Pre-Contemplation:   (Not yet acknowledging that there is a problem behavior that needs to change)

## 2023-02-13 NOTE — PROGRESS NOTES
CARDIAC REHAB ASSESSMENT    Today's date: 2023  Patient name: Kimo Peck     : 1960       MRN: [id number]  PCP: Joseph Reid DO  Referring Physician: Araceli Garrett MD  Cardiologist: Abdirashid Garcia MD  Surgeon: Cachorro Hudson MD  Dx:   Encounter Diagnoses   Name Primary?    • Coronary artery disease involving native coronary artery of native heart without angina pectoris    • Ischemic cardiomyopathy    • S/P coronary artery stent placement    • Type 1 non-ST elevation myocardial infarction (NSTEMI) s/p ADE to in-stent restenosis of mid LAD        Date of onset: 2023      Weight    Wt Readings from Last 1 Encounters:   23 98 9 kg (218 lb)      Height:   Ht Readings from Last 1 Encounters:   23 5' 9" (1 753 m)     Medical History:   Past Medical History:   Diagnosis Date   • Cerebrovascular [facility] (CVA) due to [facility] of [facility]   • Chronic kidney disease    • Diabetes mellitus ([facility] )    • GERD (gastroesophageal reflux disease)    • Hypercholesteremia    • Hyperlipidemia    • Hypertension    • 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   • Neuropathy    • Obesity    • Osteomyelitis ([facility] )     last assessed 16   • PVC's (premature ventricular contractions)     sees cardiology Dr Joaquin camargo   • Stroke [facility])     last weeof 2018 [facility]   • TIA (transient ischemic attack) 10/28/2018         Physical Limitations: n/a    Fall Risk: High   Comments: unsteady gait, patient needs to hold onto something while walking    Anginal Equivalent: Chest Pressure, Excessive fatigue and Lightheadedness   NTG use: No prescription    Risk Factors   Cholesterol: Yes  Smoking: Never used  HTN: Yes  DM: Type 2   Obesity: Yes   Inactivity: Yes  Stress:  perceived  stress: 4/10   Stressors: medical problems/hospitalizations    Goals for Stress Management:Practice Relaxation Techniques, TV and patient states he does not have high stress, but states he has feelings of depression  Scored 18 on PHQ-9 questionnaire, gave patient information for [employer] program    Family History:  Family History   Problem Relation Age of Onset   • Leukemia Mother    • Liver disease Mother    • Lung cancer Mother         heavy smoker - 3 ppd   • Heart disease Father    • Liver disease Father    • Multiple myeloma Sister    • Breast cancer Sister    • Urolithiasis Family    • Alcohol abuse Neg Hx    • Depression Neg Hx    • Drug abuse Neg Hx    • Substance Abuse Neg Hx    • Mental illness Neg Hx        Allergies: Patient has no known allergies  ETOH:   Social History     Substance and Sexual Activity   Alcohol Use Not Currently         Current Medications:   Current Outpatient Medications   Medication Sig Dispense Refill   • aspirin (ECOTRIN LOW STRENGTH) 81 mg EC tablet Take 81 mg by mouth daily Resume on 8/14       • atorvastatin (LIPITOR) 40 mg tablet TAKE 1 TABLET BY MOUTH EVERY DAY WITH DINNER 90 tablet 1   • Blood Glucose Monitoring Suppl (True Metrix Meter) w/Device KIT Use to test blood sugars 3 times daily 1 kit 0   • Blood Pressure Monitoring (BLOOD PRESSURE CUFF) MISC Use to check blood pressure before taking blood pressure medication and 1 hour after and follow instructions provided in discharge instructions based on the readings   1 each 0   • calcium acetate (CALPHRON) 667 mg 3 tablets 3x per day     • Cholecalciferol (Vitamin D3) 1 25 MG (37224 UT) CAPS TAKE 1 CAPSULE BY MOUTH ONE TIME PER WEEK 12 capsule 2   • divalproex sodium (DEPAKOTE SPRINKLE) 125 MG capsule Take 2 capsules (250 mg total) by mouth every 12 (twelve) hours 120 capsule 1   • Insulin Pen Needle (BD Pen Needle Adina U/F) 32G X 4 MM MISC Use 4 times daily 400 each 1   • Insulin Syringe-Needle U-100 (B-D INS SYRINGE 0 5CC/30GX1/2") 30G X 1/2" 0 5 ML MISC Inject once daily 100 each 1   • Lancets Ultra Thin 30G MISC Use 3 times a day 300 each 0   • metoprolol succinate (TOPROL-XL) 25 mg 24 hr tablet Take 1 tablet (25 mg total) by mouth daily Do not start before February 5, 2023  30 tablet 0   • ONETOUCH DELICA LANCETS 60W MISC by Does not apply route 3 (three) times a day 270 each 1   • prasugrel (EFFIENT) tablet Take 1 tablet by mouth daily     • True Metrix Blood Glucose Test test strip USE 1 EACH 3 (THREE) TIMES A DAY USE AS INSTRUCTED 300 strip 1     No current facility-administered medications for this visit  Functional Status Prior to Diagnosis for Treatment   Occupation: retired  Recreation: watching television  ADL’s: Capable of performing light ADLs only  [place]: Capable of performing light ADLs only  Exercise: none      Current Functional Status  Occupation: retired  Recreation: none  ADL’s:Capable of performing light ADLs only  [place]: Capable of performing light ADLs only  Exercise: none    Patient Specific Goals:  Improve heart function, start home exercise program, increase strength, increase confidence in self, improve RYP score, improve PHQ score    Short Term Program Goals: dietary modifications increased strength improved energy/stamina with ADLs exercise 120-150 mins/wk wt loss 1-2 ppw    Long Term Goals: increased maximal walking duration  increased intial training workload  Improved [facility] Activity Status score  Improved functional capacity  Improved Quality of Life - [facility] score reduced  Reduced waist circumference  Reduced stress  improved Rate Your Plate Score    Ability to reach goals/rehabilitation potential:  Good    Projected return to function: 12 weeks  Objective tests: sub-max NuStep ETT      Nutritional   Reviewed details of Rate your Plate  Discussed key elements of heart healthy eating  Reviewed patient goals for dietary modifications and their clinical implications  Reviewed most recent lipid profile       Goals for dietary modification based on Rate Your Plate Dietary Assessment:  choose lean cuts of meat  more meatless meals  low fat dairy   reduced fat cheese  increase whole grains  increase fruits and vegetables  more nuts/seeds      Emotional/Social  Patient reports feelings of depression   Gave patient information on [employer] program    Marital status:     Domestic Violence Screening: No    Comments: Chest pressure 2/1/2023  [facility]  Put in one stent  Hospitalized this past weekend for chest pain, they stated the pain was from dialysis

## 2023-02-13 NOTE — PROGRESS NOTES
Cardiology  MI Follow Up   Office Visit Note  Diana Sotelo   58 y o    male   MRN: [id number]  [address]  [address]  [address] [place], [place] [id number] [phone]    PCP: Vanda Lundborg, DO  Cardiologist: Dr Candice Sims                Summary of recommendations  Heart healthy diet  Educational information provided  Continue cardiac rehab  Medical adherence to dual antiplatelet therapy reinforced  He also has an appt with [address] [place] isaiah Montano Do  Follow up will be scheduled with Dr Candice Sims 6 weeks  Colon Ca screenin2019, up-to-date        Assessment/ plan  CAD,  · He underwent PCI/ADE of the obtuse marginal [facility] May 2022  · 2022 at [facility], he had an angioplasty of the LAD as well as the circumflex  Vessels were heavily calcified and he needed intracoronary lithotripsy  · Type I non-STEMI : [facility]): Multivessel CAD with marked progression since the prior angiographic study in May 2022  · ADE was placed over his LAD in-stent restenosis  Attempt was made to wire the occluded OM branch but wire could not be advanced beyond the stent in mid vessel  • On aspirin, Effient, a high intensity statin, beta-blocker  • Cardiac rehab has been prescribed and recommended  • Adherence to dual antiplatelet therapy reinforced  New ICM ,EF 25 to 30% down from 65% prior  Wt Readings from Last 3 Encounters:   23 96 6 kg (213 lb)   23 98 9 kg (218 lb)   23 95 7 kg (211 lb)     --beta-blocker:   Toprol 25 mg daily  --Diuretic:   Volume per dialysis  Unable to tolerate his previous dose of torsemide given hypotension  --ACE/ARB/ARNI:   Unable to tolerate Entresto given his BP  --MRA:   --SLGT2I  --ICD: Discharged with a LifeVest   Webpage nonfunctioning today  --2 g sodium diet, 1800 cc fluid restriction   Daily weights, call weight gain 2-3 lb in 1 day or 5 lb in 5 day  Moderate aortic stenosis  Moderate mitral regurgitation  ESRD on HD; Monday Wednesday Friday  AV fistula via left arm  He has established care with [facility] to consider transplant candidacy  Essential hypertension  /62 on Toprol 25 mg daily  Hyperlipidemia, on atorvastatin 40 mg daily  2/1/2023: Direct LDL 44, at goal   Latest Reference Range & Units 04/30/22 04:53 06/02/22 07:23 06/20/22 08:29 07/25/22 07:10 01/30/23 04:58 02/01/23 07:29   Cholesterol See Comment mg/dL 84 80 92 82 76    Triglycerides See Comment mg/dL 125 133 182 (H) 151 (H) 123    HDL >=40 mg/dL 34 (L) 27 (L) 31 (L) 33 (L) 30 (L)    Non-HDL Cholesterol mg/dl  53 61 49 46    LDL Calculated 0 - 100 mg/dL 25 26 25 19 21    LDL CHOLESTEROL DIRECT 0 - 100 mg/dl      44   Hx CVA  Diabetes mellitus  12/23/22:  Hemoglobin A1c 5 5, 6 0 prior  Mood disorder  Obesity BMI 32  Cardiac testing  · [facility] 5/2/22 CHRISTIANO  Mid LAD: 50% stenosed  Mid circumflex: 50% stenosed  OM1: 95% stenosed  RCA: 50% stenosed  RPAV artery: 90% stenosed  Intervention:ADE OM1  There was non-critical disease of the LAD and mid circumflex  There was a severe stenosis of the small distal RCA beyond the origin of the PDA  Medical therapy of non-critical left system disease and diffuse severe disease of the small distal RCA  · [facility] 8/5/22 Ray Gayle)  · Successful balloon angioplasty of the in-stent thrombus of the LAD stent  •  Patent stent in the mCX   •  1st Mrg lesion is 70% stenosed  (ISR)   •  Dist RCA lesion is 70% stenosed, assessed by IFR   •  RPDA lesion is 80% stenosed  •  Prox LAD to Mid LAD lesion is 70% stenosed  • TTE 1/30/23  EF  25-30%  Wall thickness is mildly increased  Mild LVH  There is mild global hypokinesis  Normal RV size and function  Mild AI  Moderate aortic stenosis  The aortic valve mean gradient is 9 mmHg  The aortic valve area is 1 97 cm2  Moderate mitral vegetation  Severe mitral annular calcification  The following segments are akinetic: basal inferior and mid inferior  The following segments are hypokinetic: basal anterior, basal anteroseptal, basal inferoseptal, basal inferolateral, basal anterolateral, mid anterior, mid anteroseptal, mid inferoseptal, mid inferolateral, mid anterolateral, apical anterior, apical septal, apical inferior, apical lateral and apex  Est RVSP 50 mm HG  • Cardiac catheterization 2/1/23  1st Mrg lesion is 100% stenosed  •  RPAV lesion is 100% stenosed  •  Mid LAD lesion is 80% stenosed  •  RPDA lesion is 60% stenosed  •  The angioplasty was pre-stent angioplasty  •  The angioplasty was pre-stent angioplasty  Multivessel CAD with marked progression since the prior angiographic study in May 2022  Stents placed in the mid LAD exhibited significant discrete and-stent restenosis  The first OM branch, stented in 5/22, has become occluded  The distal RCA beyond the PDA has akso become occluded  LAD in--stent restenosis, treated with a ADE  An attempt was made to wire the occluded OM branch, but the wire could not be advanced beyond the stent in mid vessel  Plan: DAPT, statin, compliance with medical and hemodialysis therapy, increased activity, weight loss  Discussed in detail with the patient's family                  HPI  Laura Mendoza is a 59 yo male with CAD, ESRD on HD, type 2 diabetes mellitus with long-term use of insulin, and diabetic polyneuropathy, essential hypertension, dyslipidemia, prior CVA obesity, BMI 32  He underwent coronary artery stenting of the obtuse marginal [facility] May 2022  Later on, in June 2022 at [facility] in [place] he had an angioplasty of the LAD as well as the circumflex  Vessels were heavily calcified and he needed intracoronary lithotripsy  He was scheduled for a repeat angioplasty of his distal RCA June 23, 2022  The chest pain was felt to be noncardiac related to uncontrolled blood pressure  He follows with Dr Benita Morrison and was last seen in the office in June 2022, following an admission for chest pain  He was started on carvedilol and Imdur  Wyonia Caller He reports that he has never smoked  He has never used smokeless tobacco  He       Adm 1/29-2/1/23 SLRA  CC: SOB and Chest pain  Echo: EF 25 to 30%, dropped from 65%,  moderate AS  He was volume overloaded on presentation, followed by cardiology, nephrology  Had recurrent chest pain  He was loaded with clopidogrel  Cardiology recommend transfer to [facility] given his complex CAD, for repeat cath  Adm 2/1-2/4/23--> ESEQUIELB  Dx: Type I NSTEMI -s/p ADE to in-stent restenosis of mid LAD  noted to have new cardiomyopathy with drop in EF to 25 to 30% from 65%  His blood pressure was unable to tolerate Entresto, or his home dose of torsemide  Due to hypotension carvedilol was changed to Toprol-XL 25 mg daily  He was discharged with a LifeVest        2/11/23 ED visit at 2 AM  Chief complaint: Constant pressure-like chest pain, beginning while at dialysis around 5 PM  Wearing the LifeVest  EKG: Normal sinus rhythm, right bundle branch block  Troponin 242, 210  Prior 2 weeks ago: 4037  NT pro BNP > 17,000  Hgb 10  Chest x-ray no acute disease  POS cardiac ultrasound: Unremarkable for pericardial fusion, lower concern for pericarditis  Clear bilateral breath sounds no lower extremity edema  Blood pressure was 119/59 O2 sat 99%  Heart rate 74  Patient wanted to be discharged  Advised close follow-up     2/14/23  (PMH: recent NSTEMI/ICM ( new) DCd with a LifeVEst, ESRD/HD,HTN, HL)  Hospital follow-up  Is accompanied by 4 of his children  1 son Monie Marquez, the primary family spokesperson and functioned as an   Since discharge he denies chest pain or shortness of breath  No palpitations  He is been wearing the LifeVest as directed  I am unable to assess the webpage today for additional information  He denies lightheadedness or dizziness  Compliant with his new regimen  He is now off torsemide  Today, blood pressure 110/62  Dual antiplatelet therapy, ordered  His son Jr Bolivar tells me he has an appointment scheduled in a couple of weeks, February 28 at [facility] for a second opinion, as prior with Dr Cat Rincon  Together we reviewed his coronary angiogram diagram and his echocardiogram   The patient and his family are aware of the findings  He will continue to follow with Dr Gemma Castro, locally  He will continue cardiac rehab; his first visit was yesterday  In addition he continues to follow with [facility] transplant team to consider candidacy for a renal transplant  I have spent 40 minutes with Patient and family today in which greater than 50% of this time was spent in counseling/coordination of care regarding Diagnostic results, Instructions for management, Patient and family education, Importance of tx compliance and Documenting in the medical record  Assessment  Diagnoses and all orders for this visit:    Hospital discharge follow-up    Ischemic cardiomyopathy  -     prasugrel (EFFIENT) tablet; Take 1 tablet (5 mg total) by mouth daily    CAD, multiple vessel    Status post insertion of drug eluting coronary artery stent  -     prasugrel (EFFIENT) tablet; Take 1 tablet (5 mg total) by mouth daily    Nonrheumatic aortic valve stenosis    [facility] ([facility] )    Type 2 diabetes mellitus with chronic kidney disease on chronic dialysis, with long-term current use of insulin ([facility])    Type 1 non-ST elevation myocardial infarction (NSTEMI) s/p ADE to in-stent restenosis of mid LAD  -     metoprolol succinate (TOPROL-XL) 25 mg 24 hr tablet;  Take 1 tablet (25 mg total) by mouth daily    Mixed hyperlipidemia    History of stroke          Past Medical History:   Diagnosis Date   • Cerebrovascular accident (CVA) due to thrombosis of left middle cerebral artery ([facility] ) 7/29/2018   • Chronic kidney disease    • Diabetes mellitus ([facility] )    • GERD (gastroesophageal reflux disease) • Hypercholesteremia    • Hyperlipidemia    • Hypertension    • 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   • Neuropathy    • Obesity    • Osteomyelitis ([address] )     last assessed 11/4/16   • PVC's (premature ventricular contractions)     sees cardiology Dr Justo camargo   • Stroke [facility])     last weeof July 2018 [facility] [facility]   • TIA (transient ischemic attack) 10/28/2018       Review of Systems   Constitutional: Negative for chills  Cardiovascular: Negative for chest pain, claudication, cyanosis, dyspnea on exertion, irregular heartbeat, leg swelling, near-syncope, orthopnea, palpitations, paroxysmal nocturnal dyspnea and syncope  Respiratory: Negative for cough and shortness of breath  Gastrointestinal: Negative for heartburn and nausea  Neurological: Negative for dizziness, focal weakness, headaches, light-headedness and weakness  All other systems reviewed and are negative  No Known Allergies    Current Outpatient Medications:   •  aspirin (ECOTRIN LOW STRENGTH) 81 mg EC tablet, Take 81 mg by mouth daily Resume on 8/14  , Disp: , Rfl:   •  atorvastatin (LIPITOR) 40 mg tablet, TAKE 1 TABLET BY MOUTH EVERY DAY WITH DINNER, Disp: 90 tablet, Rfl: 1  •  Blood Glucose Monitoring Suppl (True Metrix Meter) w/Device KIT, Use to test blood sugars 3 times daily, Disp: 1 kit, Rfl: 0  •  Blood Pressure Monitoring (BLOOD PRESSURE CUFF) MISC, Use to check blood pressure before taking blood pressure medication and 1 hour after and follow instructions provided in discharge instructions based on the readings  , Disp: 1 each, Rfl: 0  •  calcium acetate (CALPHRON) 667 mg, 3 tablets 3x per day, Disp: , Rfl:   •  Cholecalciferol (Vitamin D3) 1 25 MG (80112 UT) CAPS, TAKE 1 CAPSULE BY MOUTH ONE TIME PER WEEK, Disp: 12 capsule, Rfl: 2  •  divalproex sodium (DEPAKOTE SPRINKLE) 125 MG capsule, Take 2 capsules (250 mg total) by mouth every 12 (twelve) hours, Disp: 120 capsule, Rfl: 1  •  Insulin Pen Needle (BD Pen Needle Adina U/F) 32G X 4 MM MISC, Use 4 times daily, Disp: 400 each, Rfl: 1  •  Insulin Syringe-Needle U-100 (B-D INS SYRINGE 0 5CC/30GX1/2") 30G X 1/2" 0 5 ML MISC, Inject once daily, Disp: 100 each, Rfl: 1  •  metoprolol succinate (TOPROL-XL) 25 mg 24 hr tablet, Take 1 tablet (25 mg total) by mouth daily, Disp: 90 tablet, Rfl: 3  •  ONETOUCH DELICA LANCETS 79Z MISC, by Does not apply route 3 (three) times a day, Disp: 270 each, Rfl: 1  •  prasugrel (EFFIENT) tablet, Take 1 tablet (5 mg total) by mouth daily, Disp: 90 tablet, Rfl: 3  •  True Metrix Blood Glucose Test test strip, USE 1 EACH 3 (THREE) TIMES A DAY USE AS INSTRUCTED, Disp: 300 strip, Rfl: 1  •  Lancets Ultra Thin 30G MISC, Use 3 times a day, Disp: 300 each, Rfl: 0        Social History     Socioeconomic History   • Marital status: /Civil Union     Spouse name: Not on file   • Number of children: Not on file   • Years of education: Not on file   • Highest education level: Not asked   Occupational History   • Occupation: disabled   Tobacco Use   • Smoking status: Never   • Smokeless tobacco: Never   Vaping Use   • Vaping Use: Never used   Substance and Sexual Activity   • Alcohol use: Not Currently   • Drug use: No   • Sexual activity: Not Currently   Other Topics Concern   • Not on file   Social History Narrative    Daily caffeine consumption 2-3 servings a day     Social Determinants of Health     Financial Resource Strain: Not on file   Food Insecurity: No Food Insecurity   • Worried About Running Out of Food in the Last Year: Never true   • Ran Out of Food in the Last Year: Never true   Transportation Needs: No Transportation Needs   • Lack of Transportation (Medical): No   • Lack of Transportation (Non-Medical):  No   Physical Activity: Not on file   Stress: Not on file   Social Connections: Not on file   Intimate Partner Violence: Not on file   Housing Stability: Low Risk    • Unable to Pay for Housing in the Last Year: No   • Number of Places Lived in the Last Year: 1   • Unstable Housing in the Last Year: No       Family History   Problem Relation Age of Onset   • Leukemia Mother    • Liver disease Mother    • Lung cancer Mother         heavy smoker - 3 ppd   • Heart disease Father    • Liver disease Father    • Multiple myeloma Sister    • Breast cancer Sister    • Urolithiasis Family    • Alcohol abuse Neg Hx    • Depression Neg Hx    • Drug abuse Neg Hx    • Substance Abuse Neg Hx    • Mental illness Neg Hx        Physical Exam  Vitals and nursing note reviewed  Constitutional:       General: He is not in acute distress  HENT:      Head: Normocephalic and atraumatic  Eyes:      Conjunctiva/sclera: Conjunctivae normal    Cardiovascular:      Rate and Rhythm: Normal rate and regular rhythm  Pulses: Intact distal pulses  Heart sounds: Heart sounds are distant  Comments: Palpable thrill left upper arm  Pulmonary:      Effort: Pulmonary effort is normal       Breath sounds: Normal breath sounds  Abdominal:      General: Bowel sounds are normal       Palpations: Abdomen is soft  Musculoskeletal:         General: Normal range of motion  Cervical back: Normal range of motion and neck supple  Skin:     General: Skin is warm and dry  Neurological:      Mental Status: He is alert and oriented to person, place, and time  Vitals: Blood pressure 110/62, pulse 64, resp  rate 18, height 5' 9" (1 753 m), weight 96 6 kg (213 lb)     Wt Readings from Last 3 Encounters:   02/14/23 96 6 kg (213 lb)   02/09/23 98 9 kg (218 lb)   02/06/23 95 7 kg (211 lb)         Labs & Results:  Lab Results   Component Value Date    WBC 7 21 02/11/2023    HGB 10 5 (L) 02/11/2023    HCT 33 1 (L) 02/11/2023    MCV 95 02/11/2023     02/11/2023     BNP   Date Value Ref Range Status   01/29/2023 1,572 (H) 0 - 100 pg/mL Final   01/25/2023 499 (H) 0 - 100 pg/mL Final   01/09/2023 737 (H) 0 - 100 pg/mL Final     No components found for: CHEM  Total CK   Date Value Ref Range Status   11/12/2022 188 39 - 308 U/L Final   07/25/2022 164 39 - 308 U/L Final   06/20/2022 160 39 - 308 U/L Final     Troponin I   Date Value Ref Range Status   09/08/2021 <0 02 <=0 04 ng/mL Final     Comment:     Autovalidation override  [employer] Chemistry analyzer 99% cutoff is > 0 04 ng/mL in network labs     o cTnI 99% cutoff is useful only when applied to patients in the clinical setting of myocardial ischemia   o cTnI 99% cutoff should be interpreted in the context of clinical history, ECG findings and possibly cardiac imaging to establish correct diagnosis  o cTnI 99% cutoff may be suggestive but clearly not indicative of a coronary event without the clinical setting of myocardial ischemia      07/14/2021 <0 02 <=0 04 ng/mL Final     Comment:     S[employer]ens Chemistry analyzer 99% cutoff is > 0 04 ng/mL in network labs     o cTnI 99% cutoff is useful only when applied to patients in the clinical setting of myocardial ischemia   o cTnI 99% cutoff should be interpreted in the context of clinical history, ECG findings and possibly cardiac imaging to establish correct diagnosis  o cTnI 99% cutoff may be suggestive but clearly not indicative of a coronary event without the clinical setting of myocardial ischemia  12/31/2020 0 14 (H) <=0 04 ng/mL Final     Comment:     Siemens Chemistry analyzer 99% cutoff is > 0 04 ng/mL in network labs     o cTnI 99% cutoff is useful only when applied to patients in the clinical setting of myocardial ischemia   o cTnI 99% cutoff should be interpreted in the context of clinical history, ECG findings and possibly cardiac imaging to establish correct diagnosis  o cTnI 99% cutoff may be suggestive but clearly not indicative of a coronary event without the clinical setting of myocardial ischemia      Results indicate test should be repeated on new specimen collected within 4-6 hours of the original     CK-MB Index   Date Value Ref Range Status   2022 2 1 0 0 - 2 5 % Final   2022 2 1 0 0 - 2 5 % Final   2020 <1 0 0 0 - 2 5 % Final     Results for orders placed during the hospital encounter of 10/05/20    Echo complete with contrast if indicated    Narrative  [address] [facility]  [phone]    Transthoracic Echocardiogram  2D, M-mode, Doppler, and Color Doppler    Study date:  06-Oct-2020    Patient: Didier Ramirez  MR number: [id number]  Account number: [de-identified]  : 1960  Age: 61 years  Gender: Male  Status: Inpatient  Location: Bedside  Height: 70 in  Weight: 239 6 lb  BP: 165/ 80 mmHg    Indications: Shortness of breath  Diagnoses: R06 02 - Shortness of breath    Sonographer:  Saqib Cooper RDCS  Primary Physician:  Ken Marion DO  Referring Physician:  Duke Gilmore MD  Group:  [facility]  Interpreting Physician:  Breonna Felton MD    SUMMARY    LEFT VENTRICLE:  Systolic function was normal by visual assessment  Ejection fraction was estimated to be 60 %  There were no regional wall motion abnormalities  Wall thickness was moderately increased  Features were consistent with a pseudonormal left ventricular filling pattern, with concomitant abnormal relaxation and increased filling pressure (grade 2 diastolic dysfunction)  LEFT ATRIUM:  The atrium was mildly dilated  RIGHT ATRIUM:  The atrium was mildly dilated  MITRAL VALVE:  There was moderate annular calcification  There was mild regurgitation  AORTIC VALVE:  The valve was trileaflet  Leaflets exhibited normal thickness, moderate calcification, and moderately reduced cuspal separation  Transaortic velocity was increased due to valvular stenosis  There was mild to moderate stenosis  There was mild regurgitation  TRICUSPID VALVE:  There was trace regurgitation  PULMONIC VALVE:  There was mild regurgitation  HISTORY: PRIOR HISTORY: DM2  CKD4  Hypertension  CVA  GERD  Dementia  PROCEDURE: The procedure was performed at the bedside  This was a routine study  The transthoracic approach was used  The study included complete 2D imaging, M-mode, complete spectral Doppler, and color Doppler  The heart rate was 98 bpm,  at the start of the study  Image quality was adequate  LEFT VENTRICLE: Size was normal  Systolic function was normal by visual assessment  Ejection fraction was estimated to be 60 %  There were no regional wall motion abnormalities  Wall thickness was moderately increased  DOPPLER: The ratio  of early ventricular filling to atrial contraction velocities was within the normal range  Features were consistent with a pseudonormal left ventricular filling pattern, with concomitant abnormal relaxation and increased filling pressure  (grade 2 diastolic dysfunction)  RIGHT VENTRICLE: The size was normal  Systolic function was normal  DOPPLER: Systolic pressure was not estimated  LEFT ATRIUM: The atrium was mildly dilated  RIGHT ATRIUM: The atrium was mildly dilated  MITRAL VALVE: There was moderate annular calcification  Valve structure was normal  There was normal leaflet separation  DOPPLER: The transmitral velocity was within the normal range  There was no evidence for stenosis  There was mild  regurgitation  AORTIC VALVE: The valve was trileaflet  Leaflets exhibited normal thickness, moderate calcification, and moderately reduced cuspal separation  DOPPLER: Transaortic velocity was increased due to valvular stenosis  There was mild to moderate  stenosis  There was mild regurgitation  TRICUSPID VALVE: The valve structure was normal  There was normal leaflet separation  DOPPLER: The transtricuspid velocity was within the normal range  There was no evidence for stenosis  There was trace regurgitation  PULMONIC VALVE: Leaflets exhibited normal thickness, no calcification, and normal cuspal separation   DOPPLER: The transpulmonic velocity was within the normal range  There was mild regurgitation  PERICARDIUM: There was no pericardial effusion  AORTA: The root exhibited normal size  SYSTEMIC VEINS: IVC: The inferior vena cava was normal in size and course  Respirophasic changes were normal     SYSTEM MEASUREMENT TABLES    2D  %FS: 36 09 %  Ao Diam: 3 8 cm  EDV(Teich): 182 77 ml  EF(Teich): 64 8 %  ESV(Teich): 64 33 ml  IVSd: 1 57 cm  LA Area: 24 31 cm2  LA Diam: 4 63 cm  LVEDV MOD A4C: 192 34 ml  LVEF MOD A4C: 65 32 %  LVESV MOD A4C: 66 7 ml  LVIDd: 6 04 cm  LVIDs: 3 86 cm  LVLd A4C: 9 02 cm  LVLs A4C: 7 15 cm  LVOT Diam: 2 21 cm  LVPWd: 1 61 cm  RA Area: 17 56 cm2  RVIDd: 4 cm  SV MOD A4C: 125 64 ml  SV(Teich): 118 44 ml    CW  AV Env  Ti: 330 16 ms  AV MaxP 64 mmHg  AV VTI: 57 9 cm  AV Vmax: 2 58 m/s  AV Vmean: 1 76 m/s  AV meanP 38 mmHg  TR MaxP 37 mmHg  TR Vmax: 2 71 m/s    MM  TAPSE: 1 84 cm    PW  FATUMA (VTI): 1 27 cm2  FATUMA Vmax: 1 42 cm2  AVAI (VTI): 0 cm2/m2  AVAI Vmax: 0 cm2/m2  E' Sept: 0 07 m/s  E/E' Sept: 12 69  LVOT Env  Ti: 335 87 ms  LVOT VTI: 19 11 cm  LVOT Vmax: 0 95 m/s  LVOT Vmean: 0 57 m/s  LVOT maxPG: 3 63 mmHg  LVOT meanP 61 mmHg  LVSI Dopp: 32 42 ml/m2  LVSV Dopp: 73 27 ml  MV A Carlin: 0 64 m/s  MV Dec [place]: 4 7 m/s2  MV DecT: 189 67 ms  MV E Carlin: 0 89 m/s  MV E/A Ratio: 1 38  MV PHT: 55 01 ms  MVA By PHT: 4 cm2    [address] Accredited Echocardiography Laboratory    Prepared and electronically signed by    Christine Bergman MD  Signed 06-Oct-2020 16:31:49    No results found for this or any previous visit  This note was completed in part utilizing m-modal fluency direct voice recognition software  Grammatical errors, random word insertion, spelling mistakes, and incomplete sentences may be an occasional consequence of the system secondary to software limitations, ambient noise and hardware issues  At the time of dictation, efforts were made to edit, clarify and /or correct errors    Please read the chart carefully and recognize, using context, where substitutions have occurred    If you have any questions or concerns about the context, text or information contained within the body of this dictation, please contact myself, the provider, for further clarification

## 2023-02-14 ENCOUNTER — OFFICE VISIT (OUTPATIENT)
Dept: CARDIOLOGY CLINIC | Facility: CLINIC | Age: 63
End: 2023-02-14

## 2023-02-14 VITALS
DIASTOLIC BLOOD PRESSURE: 62 MMHG | SYSTOLIC BLOOD PRESSURE: 110 MMHG | RESPIRATION RATE: 18 BRPM | WEIGHT: 213 LBS | HEART RATE: 64 BPM | BODY MASS INDEX: 31.55 KG/M2 | HEIGHT: 69 IN

## 2023-02-14 DIAGNOSIS — N18.6 ESRD ON DIALYSIS (HCC): ICD-10-CM

## 2023-02-14 DIAGNOSIS — E11.22 TYPE 2 DIABETES MELLITUS WITH CHRONIC KIDNEY DISEASE ON CHRONIC DIALYSIS, WITH LONG-TERM CURRENT USE OF INSULIN (HCC): ICD-10-CM

## 2023-02-14 DIAGNOSIS — I25.5 ISCHEMIC CARDIOMYOPATHY: ICD-10-CM

## 2023-02-14 DIAGNOSIS — Z86.73 HISTORY OF STROKE: ICD-10-CM

## 2023-02-14 DIAGNOSIS — Z95.5 STATUS POST INSERTION OF DRUG ELUTING CORONARY ARTERY STENT: ICD-10-CM

## 2023-02-14 DIAGNOSIS — Z99.2 TYPE 2 DIABETES MELLITUS WITH CHRONIC KIDNEY DISEASE ON CHRONIC DIALYSIS, WITH LONG-TERM CURRENT USE OF INSULIN (HCC): ICD-10-CM

## 2023-02-14 DIAGNOSIS — I21.4 TYPE 1 NON-ST ELEVATION MYOCARDIAL INFARCTION (NSTEMI) (HCC): ICD-10-CM

## 2023-02-14 DIAGNOSIS — Z79.4 TYPE 2 DIABETES MELLITUS WITH CHRONIC KIDNEY DISEASE ON CHRONIC DIALYSIS, WITH LONG-TERM CURRENT USE OF INSULIN (HCC): ICD-10-CM

## 2023-02-14 DIAGNOSIS — I35.0 NONRHEUMATIC AORTIC VALVE STENOSIS: ICD-10-CM

## 2023-02-14 DIAGNOSIS — Z99.2 ESRD ON DIALYSIS (HCC): ICD-10-CM

## 2023-02-14 DIAGNOSIS — E78.2 MIXED HYPERLIPIDEMIA: ICD-10-CM

## 2023-02-14 DIAGNOSIS — Z09 HOSPITAL DISCHARGE FOLLOW-UP: Primary | ICD-10-CM

## 2023-02-14 DIAGNOSIS — I25.10 CAD, MULTIPLE VESSEL: ICD-10-CM

## 2023-02-14 DIAGNOSIS — N18.6 TYPE 2 DIABETES MELLITUS WITH CHRONIC KIDNEY DISEASE ON CHRONIC DIALYSIS, WITH LONG-TERM CURRENT USE OF INSULIN (HCC): ICD-10-CM

## 2023-02-14 RX ORDER — METOPROLOL SUCCINATE 25 MG/1
25 TABLET, EXTENDED RELEASE ORAL DAILY
Qty: 90 TABLET | Refills: 3 | Status: SHIPPED | OUTPATIENT
Start: 2023-02-14

## 2023-02-14 RX ORDER — PRASUGREL 5 MG/1
5 TABLET, FILM COATED ORAL DAILY
Qty: 90 TABLET | Refills: 3 | Status: SHIPPED | OUTPATIENT
Start: 2023-02-14

## 2023-02-14 NOTE — LETTER
2023     Raj Auguste DO  [address] [address]    Patient: Alisson Pro   YOB: 1960   Date of Visit: 2023       Dear Dr Rayshawn Gillespie:    Thank you for referring Idalia Sanchez to me for evaluation  Below are my notes for this consultation  If you have questions, please do not hesitate to call me  I look forward to following your patient along with you  Sincerely,        BRITTANY Adan        CC: MD Sophie Kidd, [address]  2023 12:32 PM  Sign when Signing Visit  Cardiology  MI Follow Up   Office Visit Note  Alisson Pro   58 y o    male   MRN: [id number]  [address] [place] [id number]  [phone]    PCP: Mony Harris DO  Cardiologist: Dr Michelle Sims                Summary of recommendations  Heart healthy diet  Educational information provided  Continue cardiac rehab  Medical adherence to dual antiplatelet therapy reinforced  He also has an appt with [address] [place] [address]  Follow up will be scheduled with Dr Michelle Sims 6 weeks  Colon Ca screenin2019, up-to-date        Assessment/ plan  CAD,  · He underwent PCI/ADE of the obtuse marginal [facility] May 2022  · 2022 at [facility], he had an angioplasty of the LAD as well as the circumflex  Vessels were heavily calcified and he needed intracoronary lithotripsy  · Type I non-STEMI : [facility]): Multivessel CAD with marked progression since the prior angiographic study in May 2022  · ADE was placed over his LAD in-stent restenosis  Attempt was made to wire the occluded OM branch but wire could not be advanced beyond the stent in mid vessel     • On aspirin, Effient, a high intensity statin, beta-blocker  • Cardiac rehab has been prescribed and recommended  • Adherence to dual antiplatelet therapy reinforced  New ICM ,EF 25 to 30% down from 65% prior  Wt Readings from Last 3 Encounters:   02/14/23 96 6 kg (213 lb)   02/09/23 98 9 kg (218 lb)   02/06/23 95 7 kg (211 lb)     --beta-blocker:   Toprol 25 mg daily  --Diuretic:   Volume per dialysis  Unable to tolerate his previous dose of torsemide given hypotension  --ACE/ARB/ARNI:   Unable to tolerate Entresto given his BP  --MRA:   --SLGT2I  --ICD: Discharged with a LifeVest   Webpage nonfunctioning today  --2 g sodium diet, 1800 cc fluid restriction  Daily weights, call weight gain 2-3 lb in 1 day or 5 lb in 5 day  Moderate aortic stenosis  Moderate mitral regurgitation  ESRD on HD; Monday Wednesday Friday  AV fistula via left arm  He has established care with [facility] to consider transplant candidacy  Essential hypertension  /62 on Toprol 25 mg daily  Hyperlipidemia, on atorvastatin 40 mg daily  2/1/2023: Direct LDL 44, at goal   Latest Reference Range & Units 04/30/22 04:53 06/02/22 07:23 06/20/22 08:29 07/25/22 07:10 01/30/23 04:58 02/01/23 07:29   Cholesterol See Comment mg/dL 84 80 92 82 76    Triglycerides See Comment mg/dL 125 133 182 (H) 151 (H) 123    HDL >=40 mg/dL 34 (L) 27 (L) 31 (L) 33 (L) 30 (L)    Non-HDL Cholesterol mg/dl  53 61 49 46    LDL Calculated 0 - 100 mg/dL 25 26 25 19 21    LDL CHOLESTEROL DIRECT 0 - 100 mg/dl      44   Hx CVA  Diabetes mellitus  12/23/22:  Hemoglobin A1c 5 5, 6 0 prior  Mood disorder  Obesity BMI 32  Cardiac testing  · [facility] 5/2/22 [facility]  Mid LAD: 50% stenosed  Mid circumflex: 50% stenosed  OM1: 95% stenosed  RCA: 50% stenosed  RPAV artery: 90% stenosed  Intervention:ADE OM1  There was non-critical disease of the LAD and mid circumflex  There was a severe stenosis of the small distal RCA beyond the origin of the PDA  Medical therapy of non-critical left system disease and diffuse severe disease of the small distal RCA  · [facility] 8/5/22 Vinnie Silk)  · Successful balloon angioplasty of the in-stent thrombus of the LAD stent     •  Patent stent in the mCX   •  1st Mrg lesion is 70% stenosed  (ISR)   •  Dist RCA lesion is 70% stenosed, assessed by IFR   •  RPDA lesion is 80% stenosed  •  Prox LAD to Mid LAD lesion is 70% stenosed  • TTE 1/30/23  EF  25-30%  Wall thickness is mildly increased  Mild LVH  There is mild global hypokinesis  Normal RV size and function  Mild AI  Moderate aortic stenosis  The aortic valve mean gradient is 9 mmHg  The aortic valve area is 1 97 cm2  Moderate mitral vegetation  Severe mitral annular calcification  The following segments are akinetic: basal inferior and mid inferior  The following segments are hypokinetic: basal anterior, basal anteroseptal, basal inferoseptal, basal inferolateral, basal anterolateral, mid anterior, mid anteroseptal, mid inferoseptal, mid inferolateral, mid anterolateral, apical anterior, apical septal, apical inferior, apical lateral and apex  Est RVSP 50 mm HG  • Cardiac catheterization 2/1/23  1st Mrg lesion is 100% stenosed  •  RPAV lesion is 100% stenosed  •  Mid LAD lesion is 80% stenosed  •  RPDA lesion is 60% stenosed  •  The angioplasty was pre-stent angioplasty  •  The angioplasty was pre-stent angioplasty  Multivessel CAD with marked progression since the prior angiographic study in May 2022  Stents placed in the mid LAD exhibited significant discrete and-stent restenosis  The first OM branch, stented in 5/22, has become occluded  The distal RCA beyond the PDA has akso become occluded  LAD in--stent restenosis, treated with a ADE  An attempt was made to wire the occluded OM branch, but the wire could not be advanced beyond the stent in mid vessel  Plan: DAPT, statin, compliance with medical and hemodialysis therapy, increased activity, weight loss   Discussed in detail with the patient's family                  HPI  Victorina Caban is a 57 yo male with CAD, ESRD on HD, type 2 diabetes mellitus with long-term use of insulin, and diabetic polyneuropathy, essential hypertension, dyslipidemia, prior CVA obesity, BMI 32  He underwent coronary artery stenting of the obtuse marginal [facility] May 2022  Later on, in June 2022 at [facility] in [place] he had an angioplasty of the LAD as well as the circumflex  Vessels were heavily calcified and he needed intracoronary lithotripsy  He was scheduled for a repeat angioplasty of his distal RCA June 23, 2022  The chest pain was felt to be noncardiac related to uncontrolled blood pressure  He follows with Dr Theresa Mayes and was last seen in the office in June 2022, following an admission for chest pain  He was started on carvedilol and Imdur  Perico Rizzo He reports that he has never smoked  He has never used smokeless tobacco  He       Adm 1/29-2/1/23 [facility]  CC: SOB and Chest pain  Echo: EF 25 to 30%, dropped from 65%,  moderate AS  He was volume overloaded on presentation, followed by cardiology, nephrology  Had recurrent chest pain  He was loaded with clopidogrel  Cardiology recommend transfer to [facility] given his complex CAD, for repeat cath  Adm 2/1-2/4/23--> [facility]  Dx: Type I NSTEMI -s/p ADE to in-stent restenosis of mid LAD  noted to have new cardiomyopathy with drop in EF to 25 to 30% from 65%  His blood pressure was unable to tolerate Entresto, or his home dose of torsemide  Due to hypotension carvedilol was changed to Toprol-XL 25 mg daily  He was discharged with a LifeVest        2/11/23 ED visit at 2 AM  Chief complaint: Constant pressure-like chest pain, beginning while at dialysis around 5 PM  Wearing the LifeVest  EKG: Normal sinus rhythm, right bundle branch block  Troponin 242, 210  Prior 2 weeks ago: 4037  NT pro BNP > 17,000  Hgb 10  Chest x-ray no acute disease  POS cardiac ultrasound: Unremarkable for pericardial fusion, lower concern for pericarditis  Clear bilateral breath sounds no lower extremity edema  Blood pressure was 119/59 O2 sat 99%    Heart rate 74  Patient wanted to be discharged  Advised close follow-up     2/14/23  (PMH: recent NSTEMI/ICM ( new) DCd with a LifeVEst, ESRD/HD,HTN, HL)  Hospital follow-up  Is accompanied by 4 of his children  1 son Ousmane Mckeon, the primary family spokesperson and functioned as an   Since discharge he denies chest pain or shortness of breath  No palpitations  He is been wearing the LifeVest as directed  I am unable to assess the webpage today for additional information  He denies lightheadedness or dizziness  Compliant with his new regimen  He is now off torsemide  Today, blood pressure 110/62  Dual antiplatelet therapy, ordered  His son Ousmane Mckeon tells me he has an appointment scheduled in a couple of weeks, February 28 at [address] [place] [address],[address] for a second opinion, as prior with Dr Carlos Branch  Together we reviewed his coronary angiogram diagram and his echocardiogram   The patient and his family are aware of the findings  He will continue to follow with Dr Julián Dixon, locally  He will continue cardiac rehab; his first visit was yesterday  In addition he continues to follow with [facility] transplant team to consider candidacy for a renal transplant  I have spent 40 minutes with Patient and family today in which greater than 50% of this time was spent in counseling/coordination of care regarding Diagnostic results, Instructions for management, Patient and family education, Importance of tx compliance and Documenting in the medical record  Assessment  Diagnoses and all orders for this visit:    Hospital discharge follow-up    Ischemic cardiomyopathy  -     prasugrel (EFFIENT) tablet; Take 1 tablet (5 mg total) by mouth daily    CAD, multiple vessel    Status post insertion of drug eluting coronary artery stent  -     prasugrel (EFFIENT) tablet;  Take 1 tablet (5 mg total) by mouth daily    Nonrheumatic aortic valve stenosis    ESRD on dialysis ([facility] [address] )    Type 2 diabetes mellitus with chronic kidney disease on chronic dialysis, with long-term current use of insulin (HCC)    Type 1 non-ST elevation myocardial infarction (NSTEMI) s/p AED to in-stent restenosis of mid LAD  -     metoprolol succinate (TOPROL-XL) 25 mg 24 hr tablet; Take 1 tablet (25 mg total) by mouth daily    Mixed hyperlipidemia    History of stroke          Past Medical History:   Diagnosis Date   • Cerebrovascular accident (CVA) due to thrombosis of left middle cerebral artery ([facility] ) 7/29/2018   • Chronic kidney disease    • Diabetes mellitus ([place] [address] )    • GERD (gastroesophageal reflux disease)    • Hypercholesteremia    • Hyperlipidemia    • Hypertension    • 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   • Neuropathy    • Obesity    • Osteomyelitis ([place] [address] )     last assessed 11/4/16   • PVC's (premature ventricular contractions)     sees cardiology Dr Dwain camargo   • Stroke [facility])     last weeof July 2018 [address] [place] [address]   • TIA (transient ischemic attack) 10/28/2018       Review of Systems   Constitutional: Negative for chills  Cardiovascular: Negative for chest pain, claudication, cyanosis, dyspnea on exertion, irregular heartbeat, leg swelling, near-syncope, orthopnea, palpitations, paroxysmal nocturnal dyspnea and syncope  Respiratory: Negative for cough and shortness of breath  Gastrointestinal: Negative for heartburn and nausea  Neurological: Negative for dizziness, focal weakness, headaches, light-headedness and weakness  All other systems reviewed and are negative  No Known Allergies        Current Outpatient Medications:   •  aspirin (ECOTRIN LOW STRENGTH) 81 mg EC tablet, Take 81 mg by mouth daily Resume on 8/14  , Disp: , Rfl:   •  atorvastatin (LIPITOR) 40 mg tablet, TAKE 1 TABLET BY MOUTH EVERY DAY WITH DINNER, Disp: 90 tablet, Rfl: 1  •  Blood Glucose Monitoring Suppl (True Metrix Meter) w/Device KIT, Use to test blood sugars 3 times daily, Disp: 1 kit, Rfl: 0  •  Blood Pressure Monitoring (BLOOD PRESSURE CUFF) MISC, Use to check blood pressure before taking blood pressure medication and 1 hour after and follow instructions provided in discharge instructions based on the readings  , Disp: 1 each, Rfl: 0  •  calcium acetate (CALPHRON) 667 mg, 3 tablets 3x per day, Disp: , Rfl:   •  Cholecalciferol (Vitamin D3) 1 25 MG (72634 UT) CAPS, TAKE 1 CAPSULE BY MOUTH ONE TIME PER WEEK, Disp: 12 capsule, Rfl: 2  •  divalproex sodium (DEPAKOTE SPRINKLE) 125 MG capsule, Take 2 capsules (250 mg total) by mouth every 12 (twelve) hours, Disp: 120 capsule, Rfl: 1  •  Insulin Pen Needle (BD Pen Needle Adina U/F) 32G X 4 MM MISC, Use 4 times daily, Disp: 400 each, Rfl: 1  •  Insulin Syringe-Needle U-100 (B-D INS SYRINGE 0 5CC/30GX1/2") 30G X 1/2" 0 5 ML MISC, Inject once daily, Disp: 100 each, Rfl: 1  •  metoprolol succinate (TOPROL-XL) 25 mg 24 hr tablet, Take 1 tablet (25 mg total) by mouth daily, Disp: 90 tablet, Rfl: 3  •  ONETOUCH DELICA LANCETS 79P MISC, by Does not apply route 3 (three) times a day, Disp: 270 each, Rfl: 1  •  prasugrel (EFFIENT) tablet, Take 1 tablet (5 mg total) by mouth daily, Disp: 90 tablet, Rfl: 3  •  True Metrix Blood Glucose Test test strip, USE 1 EACH 3 (THREE) TIMES A DAY USE AS INSTRUCTED, Disp: 300 strip, Rfl: 1  •  Lancets Ultra Thin 30G MISC, Use 3 times a day, Disp: 300 each, Rfl: 0        Social History     Socioeconomic History   • Marital status: /Civil Union     Spouse name: Not on file   • Number of children: Not on file   • Years of education: Not on file   • Highest education level: Not asked   Occupational History   • Occupation: disabled   Tobacco Use   • Smoking status: Never   • Smokeless tobacco: Never   Vaping Use   • Vaping Use: Never used   Substance and Sexual Activity   • Alcohol use: Not Currently   • Drug use: No   • Sexual activity: Not Currently   Other Topics Concern   • Not on file   Social History Narrative    Daily caffeine consumption 2-3 servings a day     Social Determinants of Health     Financial Resource Strain: Not on file   Food Insecurity: No Food Insecurity   • Worried About Running Out of Food in the Last Year: Never true   • Ran Out of Food in the Last Year: Never true   Transportation Needs: No Transportation Needs   • Lack of Transportation (Medical): No   • Lack of Transportation (Non-Medical): No   Physical Activity: Not on file   Stress: Not on file   Social Connections: Not on file   Intimate Partner Violence: Not on file   Housing Stability: Low Risk    • Unable to Pay for Housing in the Last Year: No   • Number of Places Lived in the Last Year: 1   • Unstable Housing in the Last Year: No       Family History   Problem Relation Age of Onset   • Leukemia Mother    • Liver disease Mother    • Lung cancer Mother         heavy smoker - 3 ppd   • Heart disease Father    • Liver disease Father    • Multiple myeloma Sister    • Breast cancer Sister    • Urolithiasis Family    • Alcohol abuse Neg Hx    • Depression Neg Hx    • Drug abuse Neg Hx    • Substance Abuse Neg Hx    • Mental illness Neg Hx        Physical Exam  Vitals and nursing note reviewed  Constitutional:       General: He is not in acute distress  HENT:      Head: Normocephalic and atraumatic  Eyes:      Conjunctiva/sclera: Conjunctivae normal    Cardiovascular:      Rate and Rhythm: Normal rate and regular rhythm  Pulses: Intact distal pulses  Heart sounds: Heart sounds are distant  Comments: Palpable thrill left upper arm  Pulmonary:      Effort: Pulmonary effort is normal       Breath sounds: Normal breath sounds  Abdominal:      General: Bowel sounds are normal       Palpations: Abdomen is soft  Musculoskeletal:         General: Normal range of motion  Cervical back: Normal range of motion and neck supple  Skin:     General: Skin is warm and dry  Neurological:      Mental Status: He is alert and oriented to person, place, and time           Vitals: Blood pressure 110/62, pulse 64, resp  rate 18, height 5' 9" (1 753 m), weight 96 6 kg (213 lb)  Wt Readings from Last 3 Encounters:   02/14/23 96 6 kg (213 lb)   02/09/23 98 9 kg (218 lb)   02/06/23 95 7 kg (211 lb)         Labs & Results:  Lab Results   Component Value Date    WBC 7 21 02/11/2023    HGB 10 5 (L) 02/11/2023    HCT 33 1 (L) 02/11/2023    MCV 95 02/11/2023     02/11/2023     BNP   Date Value Ref Range Status   01/29/2023 1,572 (H) 0 - 100 pg/mL Final   01/25/2023 499 (H) 0 - 100 pg/mL Final   01/09/2023 737 (H) 0 - 100 pg/mL Final     No components found for: CHEM  Total CK   Date Value Ref Range Status   11/12/2022 188 39 - 308 U/L Final   07/25/2022 164 39 - 308 U/L Final   06/20/2022 160 39 - 308 U/L Final     Troponin I   Date Value Ref Range Status   09/08/2021 <0 02 <=0 04 ng/mL Final     Comment:     Autovalidation override  Siemens Chemistry analyzer 99% cutoff is > 0 04 ng/mL in network labs     o cTnI 99% cutoff is useful only when applied to patients in the clinical setting of myocardial ischemia   o cTnI 99% cutoff should be interpreted in the context of clinical history, ECG findings and possibly cardiac imaging to establish correct diagnosis  o cTnI 99% cutoff may be suggestive but clearly not indicative of a coronary event without the clinical setting of myocardial ischemia      07/14/2021 <0 02 <=0 04 ng/mL Final     Comment:     Siemens Chemistry analyzer 99% cutoff is > 0 04 ng/mL in network labs     o cTnI 99% cutoff is useful only when applied to patients in the clinical setting of myocardial ischemia   o cTnI 99% cutoff should be interpreted in the context of clinical history, ECG findings and possibly cardiac imaging to establish correct diagnosis  o cTnI 99% cutoff may be suggestive but clearly not indicative of a coronary event without the clinical setting of myocardial ischemia       12/31/2020 0 14 (H) <=0 04 ng/mL Final     Comment:     Siemens Chemistry analyzer 99% cutoff is > 0 04 ng/mL in network labs     o cTnI 99% cutoff is useful only when applied to patients in the clinical setting of myocardial ischemia   o cTnI 99% cutoff should be interpreted in the context of clinical history, ECG findings and possibly cardiac imaging to establish correct diagnosis  o cTnI 99% cutoff may be suggestive but clearly not indicative of a coronary event without the clinical setting of myocardial ischemia  Results indicate test should be repeated on new specimen collected within 4-6 hours of the original     CK-MB Index   Date Value Ref Range Status   2022 2 1 0 0 - 2 5 % Final   2022 2 1 0 0 - 2 5 % Final   2020 <1 0 0 0 - 2 5 % Final     Results for orders placed during the hospital encounter of 10/05/20    Echo complete with contrast if indicated    Narrative  [place] [address], [address] [facility]  [phone]    Transthoracic Echocardiogram  2D, M-mode, Doppler, and Color Doppler    Study date:  06-Oct-2020    Patient: Gege Theodore  MR number: [id number]  Account number: [de-identified]  : 1960  Age: 61 years  Gender: Male  Status: Inpatient  Location: Bedside  Height: 70 in  Weight: 239 6 lb  BP: 165/ 80 mmHg    Indications: Shortness of breath  Diagnoses: R06 02 - Shortness of breath    Sonographer:  Gustave Litten, RDCS  Primary Physician:  Jhonatan Booth DO  Referring Physician:  Lauren Adams MD  Group:  [facility]  Interpreting Physician:  Verónica Salgado MD    SUMMARY    LEFT VENTRICLE:  Systolic function was normal by visual assessment  Ejection fraction was estimated to be 60 %  There were no regional wall motion abnormalities  Wall thickness was moderately increased  Features were consistent with a pseudonormal left ventricular filling pattern, with concomitant abnormal relaxation and increased filling pressure (grade 2 diastolic dysfunction)  LEFT ATRIUM:  The atrium was mildly dilated      RIGHT ATRIUM:  The atrium was mildly dilated  MITRAL VALVE:  There was moderate annular calcification  There was mild regurgitation  AORTIC VALVE:  The valve was trileaflet  Leaflets exhibited normal thickness, moderate calcification, and moderately reduced cuspal separation  Transaortic velocity was increased due to valvular stenosis  There was mild to moderate stenosis  There was mild regurgitation  TRICUSPID VALVE:  There was trace regurgitation  PULMONIC VALVE:  There was mild regurgitation  HISTORY: PRIOR HISTORY: DM2  CKD4  Hypertension  CVA  GERD  Dementia  PROCEDURE: The procedure was performed at the bedside  This was a routine study  The transthoracic approach was used  The study included complete 2D imaging, M-mode, complete spectral Doppler, and color Doppler  The heart rate was 98 bpm,  at the start of the study  Image quality was adequate  LEFT VENTRICLE: Size was normal  Systolic function was normal by visual assessment  Ejection fraction was estimated to be 60 %  There were no regional wall motion abnormalities  Wall thickness was moderately increased  DOPPLER: The ratio  of early ventricular filling to atrial contraction velocities was within the normal range  Features were consistent with a pseudonormal left ventricular filling pattern, with concomitant abnormal relaxation and increased filling pressure  (grade 2 diastolic dysfunction)  RIGHT VENTRICLE: The size was normal  Systolic function was normal  DOPPLER: Systolic pressure was not estimated  LEFT ATRIUM: The atrium was mildly dilated  RIGHT ATRIUM: The atrium was mildly dilated  MITRAL VALVE: There was moderate annular calcification  Valve structure was normal  There was normal leaflet separation  DOPPLER: The transmitral velocity was within the normal range  There was no evidence for stenosis  There was mild  regurgitation  AORTIC VALVE: The valve was trileaflet   Leaflets exhibited normal thickness, moderate calcification, and moderately reduced cuspal separation  DOPPLER: Transaortic velocity was increased due to valvular stenosis  There was mild to moderate  stenosis  There was mild regurgitation  TRICUSPID VALVE: The valve structure was normal  There was normal leaflet separation  DOPPLER: The transtricuspid velocity was within the normal range  There was no evidence for stenosis  There was trace regurgitation  PULMONIC VALVE: Leaflets exhibited normal thickness, no calcification, and normal cuspal separation  DOPPLER: The transpulmonic velocity was within the normal range  There was mild regurgitation  PERICARDIUM: There was no pericardial effusion  AORTA: The root exhibited normal size  SYSTEMIC VEINS: IVC: The inferior vena cava was normal in size and course  Respirophasic changes were normal     SYSTEM MEASUREMENT TABLES    2D  %FS: 36 09 %  Ao Diam: 3 8 cm  EDV(Teich): 182 77 ml  EF(Teich): 64 8 %  ESV(Teich): 64 33 ml  IVSd: 1 57 cm  LA Area: 24 31 cm2  LA Diam: 4 63 cm  LVEDV MOD A4C: 192 34 ml  LVEF MOD A4C: 65 32 %  LVESV MOD A4C: 66 7 ml  LVIDd: 6 04 cm  LVIDs: 3 86 cm  LVLd A4C: 9 02 cm  LVLs A4C: 7 15 cm  LVOT Diam: 2 21 cm  LVPWd: 1 61 cm  RA Area: 17 56 cm2  RVIDd: 4 cm  SV MOD A4C: 125 64 ml  SV(Teich): 118 44 ml    CW  AV Env  Ti: 330 16 ms  AV MaxP 64 mmHg  AV VTI: 57 9 cm  AV Vmax: 2 58 m/s  AV Vmean: 1 76 m/s  AV meanP 38 mmHg  TR MaxP 37 mmHg  TR Vmax: 2 71 m/s    MM  TAPSE: 1 84 cm    PW  FATUMA (VTI): 1 27 cm2  FATUMA Vmax: 1 42 cm2  AVAI (VTI): 0 cm2/m2  AVAI Vmax: 0 cm2/m2  E' Sept: 0 07 m/s  E/E' Sept: 12 69  LVOT Env  Ti: 335 87 ms  LVOT VTI: 19 11 cm  LVOT Vmax: 0 95 m/s  LVOT Vmean: 0 57 m/s  LVOT maxPG: 3 63 mmHg  LVOT meanP 61 mmHg  LVSI Dopp: 32 42 ml/m2  LVSV Dopp: 73 27 ml  MV A Carlin: 0 64 m/s  MV Dec [place]: 4 7 m/s2  MV DecT: 189 67 ms  MV E [place]: 0 89 m/s  MV E/A Ratio: 1 38  MV PHT: 55 01 ms  MVA By PHT: 4 cm2    [employer] Accredited Echocardi[facility]    Prepared and electronically signed by    Irene Carmen MD  Signed 06-Oct-2020 16:31:49    No results found for this or any previous visit  This note was completed in part utilizing [employer] fluency direct voice recognition software  Grammatical errors, random word insertion, spelling mistakes, and incomplete sentences may be an occasional consequence of the system secondary to software limitations, ambient noise and hardware issues  At the time of dictation, efforts were made to edit, clarify and /or correct errors  Please read the chart carefully and recognize, using context, where substitutions have occurred    If you have any questions or concerns about the context, text or information contained within the body of this dictation, please contact myself, the provider, for further clarification

## 2023-02-14 NOTE — PATIENT INSTRUCTIONS
Mediterranean Diet   AMBULATORY CARE:   A Mediterranean diet  is a meal plan that includes foods that are commonly eaten in countries that border the [place]  This meal plan may provide several health benefits  These include losing or maintaining weight, and decreasing blood pressure, blood sugar, and cholesterol levels  It may also help protect against certain health conditions such as heart disease, cancer, type 2 diabetes, and Alzheimer disease  Work with a dietitian to develop a meal plan that is right for you  Foods to include in the [address] diet:   Include fruits and vegetables in each meal   Eat a variety of fresh fruits and vegetables  Choose whole grains every day  These foods include whole-grain breads, pastas, and cereals  It also includes brown rice, quinoa, and millet  Use unsaturated fats instead of saturated fats  Cook with olive or canola oil  Limit saturated fats, such as butter, margarine, and shortening  Saturated fat is an unhealthy fat that can increase your cholesterol levels  Choose plant foods, poultry, and fish as your main sources of protein  Eat plant-based foods that provide protein,  such as lentils, beans, chickpeas, nuts, and seeds  Choose mostly plant-based foods in place of meat on most days of the week  Eat protein foods high in omega-3 fats  Fish high in omega-3 fats include salmon, trout, and tuna  Include these types of fish 1 or 2 times each week  Limit fish high in mercury, such as shark, swordfish, tilefish, and nicanor mackerel  Omega-3 fats are also found in walnuts and flaxseed  Choose poultry (chicken or turkey)  without skin instead of red meat  Red meat is high in saturated fat  Limit eggs and high-fat meats, such as dougherty, sausage, and hot dogs  Choose low-fat dairy foods  such as nonfat or 1% milk, or low-fat almond, cashew, or soy milk  Other examples include low-fat cheese, yogurt, and cottage cheese  Limit sweets  Limit your intake of high-sugar foods, such as soda, desserts, and candy  Talk to your healthcare provider about alcohol  Studies have shown that moderate intake of wine may reduce the risk of heart disease  A moderate amount of wine is 1 serving for women and men 65 years and older each day  Two servings is recommended for men 24to 59years of age each day  A serving of wine is 5 ounces  Other things you need to know if you follow the Mediterranean diet:   Include foods high in iron and vitamin C   Plant-based foods that are high in iron include spinach, beans, tofu, and artichoke  Eat a serving of vitamin C with any iron-rich food to help your body absorb more iron  Examples include oranges, strawberries, cantaloupe, broccoli, and yellow peppers  Get regular physical activity  The Mediterranean diet will have the most benefit if you get regular physical activity  Get 30 minutes of physical activity at least 5 days a week  Choose physical activities that increase your heart rate  Examples include walking, hiking, swimming, and riding a bike  Ask your healthcare provider about the best exercise plan for you  © Copyright [employer] 2022 Information is for End User's use only and may not be sold, redistributed or otherwise used for commercial purposes  All illustrations and images included in CareNotes® are the copyrighted property of [employer]  or Omar Chu  The above information is an  only  It is not intended as medical advice for individual conditions or treatments  Talk to your doctor, nurse or pharmacist before following any medical regimen to see if it is safe and effective for you

## 2023-02-15 ENCOUNTER — CLINICAL SUPPORT (OUTPATIENT)
Dept: CARDIAC REHAB | Facility: CLINIC | Age: 63
End: 2023-02-15

## 2023-02-15 ENCOUNTER — HOME CARE VISIT (OUTPATIENT)
Dept: HOME HEALTH SERVICES | Facility: HOME HEALTHCARE | Age: 63
End: 2023-02-15

## 2023-02-15 VITALS — HEART RATE: 69 BPM | OXYGEN SATURATION: 96 %

## 2023-02-15 DIAGNOSIS — I21.4 TYPE 1 NON-ST ELEVATION MYOCARDIAL INFARCTION (NSTEMI) (HCC): ICD-10-CM

## 2023-02-16 ENCOUNTER — HOME CARE VISIT (OUTPATIENT)
Dept: HOME HEALTH SERVICES | Facility: HOME HEALTHCARE | Age: 63
End: 2023-02-16

## 2023-02-17 ENCOUNTER — CLINICAL SUPPORT (OUTPATIENT)
Dept: CARDIAC REHAB | Facility: CLINIC | Age: 63
End: 2023-02-17

## 2023-02-17 VITALS
HEART RATE: 72 BPM | DIASTOLIC BLOOD PRESSURE: 62 MMHG | RESPIRATION RATE: 16 BRPM | SYSTOLIC BLOOD PRESSURE: 112 MMHG | TEMPERATURE: 98 F | OXYGEN SATURATION: 97 %

## 2023-02-17 DIAGNOSIS — I21.4 TYPE 1 NON-ST ELEVATION MYOCARDIAL INFARCTION (NSTEMI) (HCC): Primary | ICD-10-CM

## 2023-02-20 ENCOUNTER — CLINICAL SUPPORT (OUTPATIENT)
Dept: CARDIAC REHAB | Facility: CLINIC | Age: 63
End: 2023-02-20

## 2023-02-20 DIAGNOSIS — I21.4 TYPE 1 NON-ST ELEVATION MYOCARDIAL INFARCTION (NSTEMI) (HCC): Primary | ICD-10-CM

## 2023-02-22 ENCOUNTER — CLINICAL SUPPORT (OUTPATIENT)
Dept: CARDIAC REHAB | Facility: CLINIC | Age: 63
End: 2023-02-22

## 2023-02-22 DIAGNOSIS — I21.4 TYPE 1 NON-ST ELEVATION MYOCARDIAL INFARCTION (NSTEMI) (HCC): Primary | ICD-10-CM

## 2023-02-24 ENCOUNTER — APPOINTMENT (OUTPATIENT)
Dept: CARDIAC REHAB | Facility: CLINIC | Age: 63
End: 2023-02-24

## 2023-02-27 ENCOUNTER — CLINICAL SUPPORT (OUTPATIENT)
Dept: CARDIAC REHAB | Facility: CLINIC | Age: 63
End: 2023-02-27

## 2023-02-27 DIAGNOSIS — I21.4 TYPE 1 NON-ST ELEVATION MYOCARDIAL INFARCTION (NSTEMI) (HCC): Primary | ICD-10-CM

## 2023-03-01 NOTE — PROGRESS NOTES
Cardiac Rehabilitation Plan of Care   14 Day Reassessment and transfer to [address] [place] [address] date: 3/1/2023   # of Exercise Sessions Completed: 6  Patient name: Aida Baugh      : 1960  Age: 61 y o  MRN: [id number]  Referring Physician: BRITTANY Marcano  Cardiologist: Leslie Parra MD  Provider: Brandon Mortensen  Clinician: Supriya Roblero RN    Dx:   Encounter Diagnosis   Name Primary? • Type 1 non-ST elevation myocardial infarction (NSTEMI) ([address] ) Yes     Date of onset: 2023      SUMMARY OF PROGRESS:    3/1/2023 Mrs Abelino Jensen requested Destineeraheem Varela to be transferred to cardiac rehabilitation at [address] [place] [address] due to her schedule  He completed 6 sessions of cardiac rehabilitation  He does not follow an exercise program at home  He has dialysis 3 times a week  He often c/o fatigue, Chest pain 4/10 and weakness and dizziness  He wears a life vest  He needs positive reinforcement to continue to be active  He often cries  Unable to complete PHQ9 due to patient did not return to CR at Brandon Mortensen MD aware of known depression and anxiety  His telemetry monitor has been NSR with BBB at rest and with exercise  He completes 22-30 minutes of cardiovascular exercise with encouragement  Exercise MET level is 1 9-2 5  He completes 20 minutes on the nu-step at level 2 (30 greene) and 10 minutes on the recumbent bike level 1 16 greene  His last weight was 219 on 2023 a decrease from one pound since 2023  Resting heart rate 76-84, resting -122/64-68 with Normal response to exercise reaching 108-148/58-82  RPE 5-7  Blood Sugars   The patient's CAD risk factors include:  inactivity, stress, obesity/overweight, hypertension, hyperlipidemia and diabetes  His education will focus on lifestyle modification/education specific to His needs    Patient will attend group education classes on heart healthy eating, reading food labels, stress management, risk factor reduction, understanding heart disease and common heart medications  Pt to transfer to [facility] cardiac rehabilitation for remainder of program      2/13/2023 Today is Janae Emmanuel initial evaluation to begin Cardiac Rehab post S/P coronary artery stent placement  The patient does not currently follow a formal exercise program at home  He has resumed light ADLs reporting weakness and fatigue  Depression screening using the PHQ-9 interprets the patient's score of  18 as 15-19 = Moderately Severe Depression  LEYLA-7 screening tool for anxiety suggests 16  10-14 = Moderate anxiety  When addressed, the patient admits to having depression/anxiety  Patient reports excellent social/emotional support from wife  Information to utilize [employer] was provided as well as contact information for counseling through [facility]  PHQ-9 score will be reassessed in 30 days due to an initial score revealing concern for depression  They rate stress 4/10 with the following stressors: medical history and hospitalizations  Stress management will be reviewed  The patient is a non-smoker  Patient admits to 100% medication compliance  The patient completed an initial submaximal NuStep ETT  The patient completed 3 minutes of stage 1 (1 90 METs) with test termination of RPE 6  Resting  /64 with Normal response to exercise reaching 126/72  Blood Pressure will be monitored throughout the program and cardiologist will be notified of elevated trends  Patient reported symptoms with exercise including fatigue, dizziness    Telemetry revealed NSR  We discussed current dietary habits and goals of heart healthy eating for weight loss  The patient has T2D  Patient's personal goals include: improve heart health, increase strength, weight loss, start home exercise program, and start using [employer] program  The patient's CAD risk factors include:  inactivity, stress, obesity/overweight, hypertension, hyperlipidemia and diabetes    His education will focus on lifestyle modification/education specific to His needs  Patient will attend group education classes on heart healthy eating, reading food labels, stress management, risk factor reduction, understanding heart disease and common heart medications  Patient will attend 35 monitored exercise sessions, 3x/wk for 12-18 weeks beginning 2/15/2023       Medication compliance: Yes   Comments: Pt reports to be compliant with medications  Fall Risk: High   Comments: unsteady gait, needs to hold onto something while walking    EKG Interpretation: NSR      EXERCISE ASSESSMENT and PLAN    Exercise Prescription:      Frequency: 3 days/week   Supplement with home exercise 2+ days/wk as tolerated       Minutes: 22-30         METS: 1 9-2 5        HR: 68-89   RPE: 5-7         Modalities: NuStep and Recumbent bike      30 Day Goals for Exercise Progression:    Frequency: 3 days/week of cardiac rehab       Supplement with home exercise 2+ days/wk as tolerated    Minutes: 30-40                              >150 mins/wk of moderate intensity exercise   METS: 2 0-3 0   HR: 65-82   RPE: 4-6   Modalities: NuStep, Recumbent bike and Room walking    Strength trainin-3 days / week  12-15 repetitions  1-2 sets per modality   Will be added following 2-3 weeks of monitored exercise sessions   Modalities: Lateral Raise, Arm Curl, Sit to [employer] and Shoulder Shrugs    Home Exercise: none    Goals: 10% improvement in functional capacity - based on max METs achieved in fitness assessment, improved DASI score by 10%, Increase in exercise capacity by 40% - based on peak METs tolerated in cardiac rehab exercise session, Exercise 5 days/wk, >150mins/wk of moderate intensity exercise, Resume ADLs with increased strength, Attend Rehab regularly, Decrease sitting time and start a home exercise program    Progression Toward Goals:  Reviewed Pt goals and determined plan of care, Patient will move around more when at home, transfer to [facility] in the next 30 days    Education: benefit of exercise for CAD risk factors and RPE scale   Plan:education on home exercise guidelines, home exercise 30+ mins 2 days opposite CR and Education class: Risk Factors for Heart Disease  Readiness to change: Pre-Contemplation:   (Not yet acknowledging that there is a problem behavior that needs to change)      NUTRITION ASSESSMENT AND PLAN    Weight control:    Starting weight: 220   Current weight:   219  Waist circumference:    Startin   Current:      Diabetes: T2D    Lipid management: Discussed diet and lipid management    Goals:reduced waist circumference <40 inches (M), 2 5-5%  wt loss, choose lean meat (93-95%), increase intake of meatless meals, use low fat dairy, reduce cheese intake or use reduced-fat, eat 3 or more servings of whole grains a day, Eat 4-5 cups of fruits and vegetables daily, choose healthy snacks: light popcorn, plain pretzels, Increase intake of nuts and seeds and daily saturated fat intake <7%/13g    Measurable goals were based Rate Your Plate Dietary Self-Assessment  These are the areas in which the patient could score higher on the assessment  Goals include recommendations for a heart healthy diet based on American Heart Association      Progression Toward Goals: Reviewed Pt goals and determined plan of care    Education: heart healthy eating  low sodium diet  hydration  wt  loss   Plan: Education class: Reading Food Labels, Education Class: Heart Healthy Eating, switch to low fat cheeses, replace refined grain bread with whole grain bread, replace unhealthy snacks with fruits & vegs, avoid processed foods and keep added daily sugar <25g/day  Readiness to change: Preparation:  (Getting ready to change)       PSYCHOSOCIAL ASSESSMENT AND PLAN    Emotional:  Depression assessment:  PHQ-9 = 18  15-19 = Moderately Severe Depression            Anxiety measure:  LEYLA-7 = 16  10-14 = Moderate anxiety  Self-reported stress level:  4  Social support: Very Good    Goals:  Reduce perceived stress to 1-3/10, improved [facility] QOL < 27, PHQ-9 - reduced severity by one level, Feelings in [facility] Score < 3, Physical Fitness in [facility] Score < 3, Social Support in [facility] Score < 3, Daily Activity in [facility] Score < 3, Social Activities in [facility] Score < 3, Pain in [facility] Score < 3, Overall Health in [facility] Score < 3, Quality of Life in [facility] Score < 3 , Change in Health in [place] Score < 3 , Increased interest in doing things and improved positive thoughts of well being    Progression Toward Goals: Reviewed Pt goals and determined plan of care, Patient will start using [employer] program in the next 30 days    Education: signs/sxs of depression, benefits of a positive support system and stress management techniques  Plan: Class: Stress and Your Health, Class: Relaxation, PHQ-9 >5 will refer to MD, Refer to [facility], Practice relaxation techniques, Enjoy a hobby and Keep a positive mindset  Readiness to change: Pre-Contemplation:   (Not yet acknowledging that there is a problem behavior that needs to change)      OTHER CORE COMPONENTS     Tobacco:   Social History     Tobacco Use   Smoking Status Never   Smokeless Tobacco Never       Tobacco Use Intervention:   N/A:  Patient is a non-smoker     Anginal Symptoms:  chest pressure, excessive fatigue and lightheadedness   NTG use: No prescription    Blood pressure:    Restin-122/64-68   Exercise: 108-148/58-82    Goals: consistent BP < 130/80, reduced dietary sodium <2300mg, moderate intensity exercise >150 mins/wk, medication compliance and reduced angina     Progression Toward Goals: Reviewed Pt goals and determined plan of care    Education:  understanding high blood pressure and it's relationship to CAD and low sodium diet and HTN  Plan: Class: Understanding Heart Disease, Class: Common Heart Medications, Avoid Processed foods, engage in regular exercise and check labels for sodium content  Readiness to change: Pre-Contemplation:   (Not yet acknowledging that there is a problem behavior that needs to change)

## 2023-03-02 ENCOUNTER — CLINICAL SUPPORT (OUTPATIENT)
Dept: CARDIAC REHAB | Facility: CLINIC | Age: 63
End: 2023-03-02

## 2023-03-02 DIAGNOSIS — I21.4 TYPE 1 NON-ST ELEVATION MYOCARDIAL INFARCTION (NSTEMI) (HCC): Primary | ICD-10-CM

## 2023-03-06 ENCOUNTER — APPOINTMENT (EMERGENCY)
Dept: CT IMAGING | Facility: HOSPITAL | Age: 63
End: 2023-03-06

## 2023-03-06 ENCOUNTER — HOSPITAL ENCOUNTER (EMERGENCY)
Facility: HOSPITAL | Age: 63
Discharge: HOME | End: 2023-03-06
Attending: EMERGENCY MEDICINE

## 2023-03-06 ENCOUNTER — APPOINTMENT (EMERGENCY)
Dept: RADIOLOGY | Facility: HOSPITAL | Age: 63
End: 2023-03-06

## 2023-03-06 VITALS
OXYGEN SATURATION: 98 % | TEMPERATURE: 98.2 F | SYSTOLIC BLOOD PRESSURE: 134 MMHG | DIASTOLIC BLOOD PRESSURE: 66 MMHG | HEART RATE: 80 BPM | RESPIRATION RATE: 18 BRPM

## 2023-03-06 DIAGNOSIS — S46.912A LEFT SHOULDER STRAIN, INITIAL ENCOUNTER: Primary | ICD-10-CM

## 2023-03-06 LAB
ALBUMIN SERPL BCP-MCNC: 4 G/DL (ref 3.5–5)
ALP SERPL-CCNC: 57 U/L (ref 34–104)
ALT SERPL W P-5'-P-CCNC: 6 U/L (ref 7–52)
ANION GAP SERPL CALCULATED.3IONS-SCNC: 11 MMOL/L (ref 4–13)
AST SERPL W P-5'-P-CCNC: 8 U/L (ref 13–39)
BASOPHILS # BLD AUTO: 0.03 THOUSANDS/ÂΜL (ref 0–0.1)
BASOPHILS NFR BLD AUTO: 0 % (ref 0–1)
BILIRUB SERPL-MCNC: 0.78 MG/DL (ref 0.2–1)
BUN SERPL-MCNC: 28 MG/DL (ref 5–25)
CALCIUM SERPL-MCNC: 9.7 MG/DL (ref 8.4–10.2)
CHLORIDE SERPL-SCNC: 94 MMOL/L (ref 96–108)
CO2 SERPL-SCNC: 32 MMOL/L (ref 21–32)
CREAT SERPL-MCNC: 4.72 MG/DL (ref 0.6–1.3)
CRP SERPL QL: 156.1 MG/L
EOSINOPHIL # BLD AUTO: 0.02 THOUSAND/ÂΜL (ref 0–0.61)
EOSINOPHIL NFR BLD AUTO: 0 % (ref 0–6)
ERYTHROCYTE [DISTWIDTH] IN BLOOD BY AUTOMATED COUNT: 15.3 % (ref 11.6–15.1)
ERYTHROCYTE [SEDIMENTATION RATE] IN BLOOD: 53 MM/HOUR (ref 0–19)
GFR SERPL CREATININE-BSD FRML MDRD: 12 ML/MIN/1.73SQ M
GLUCOSE SERPL-MCNC: 113 MG/DL (ref 65–140)
HCT VFR BLD AUTO: 33 % (ref 36.5–49.3)
HGB BLD-MCNC: 10.6 G/DL (ref 12–17)
IMM GRANULOCYTES # BLD AUTO: 0.06 THOUSAND/UL (ref 0–0.2)
IMM GRANULOCYTES NFR BLD AUTO: 1 % (ref 0–2)
LYMPHOCYTES # BLD AUTO: 0.95 THOUSANDS/ÂΜL (ref 0.6–4.47)
LYMPHOCYTES NFR BLD AUTO: 12 % (ref 14–44)
MCH RBC QN AUTO: 30.5 PG (ref 26.8–34.3)
MCHC RBC AUTO-ENTMCNC: 32.1 G/DL (ref 31.4–37.4)
MCV RBC AUTO: 95 FL (ref 82–98)
MONOCYTES # BLD AUTO: 1.15 THOUSAND/ÂΜL (ref 0.17–1.22)
MONOCYTES NFR BLD AUTO: 14 % (ref 4–12)
NEUTROPHILS # BLD AUTO: 5.87 THOUSANDS/ÂΜL (ref 1.85–7.62)
NEUTS SEG NFR BLD AUTO: 73 % (ref 43–75)
NRBC BLD AUTO-RTO: 0 /100 WBCS
PLATELET # BLD AUTO: 138 THOUSANDS/UL (ref 149–390)
PMV BLD AUTO: 9.7 FL (ref 8.9–12.7)
POTASSIUM SERPL-SCNC: 4.2 MMOL/L (ref 3.5–5.3)
PROT SERPL-MCNC: 7.3 G/DL (ref 6.4–8.4)
RBC # BLD AUTO: 3.48 MILLION/UL (ref 3.88–5.62)
SODIUM SERPL-SCNC: 137 MMOL/L (ref 135–147)
WBC # BLD AUTO: 8.08 THOUSAND/UL (ref 4.31–10.16)

## 2023-03-06 RX ORDER — OXYCODONE HYDROCHLORIDE AND ACETAMINOPHEN 5; 325 MG/1; MG/1
1 TABLET ORAL ONCE
Status: COMPLETED | OUTPATIENT
Start: 2023-03-06 | End: 2023-03-06

## 2023-03-06 RX ORDER — ACETAMINOPHEN 325 MG/1
975 TABLET ORAL ONCE
Status: DISCONTINUED | OUTPATIENT
Start: 2023-03-06 | End: 2023-03-06

## 2023-03-06 RX ORDER — LIDOCAINE HYDROCHLORIDE AND EPINEPHRINE 10; 10 MG/ML; UG/ML
20 INJECTION, SOLUTION INFILTRATION; PERINEURAL ONCE
Status: COMPLETED | OUTPATIENT
Start: 2023-03-06 | End: 2023-03-06

## 2023-03-06 RX ADMIN — OXYCODONE HYDROCHLORIDE AND ACETAMINOPHEN 1 TABLET: 5; 325 TABLET ORAL at 18:13

## 2023-03-06 RX ADMIN — LIDOCAINE HYDROCHLORIDE,EPINEPHRINE BITARTRATE 20 ML: 10; .01 INJECTION, SOLUTION INFILTRATION; PERINEURAL at 21:03

## 2023-03-06 NOTE — ED PROVIDER NOTES
History  Chief Complaint   Patient presents with   • Shoulder Pain   • Fall     Left shoulder pain after fall 5 days ago  Denies hitting head, no LOC  Finished full dialysis tx before coming in  Recent NSTEMI, no CP/SOB     59-year-old male with LVAD, ESRD on dialysis 3 times a week presenting to the ED due to left shoulder pain  Patient states 3 days ago, he was walking outside in the snow, his foot slipped out from underneath him causing him to fall directly onto his butt  Pt braced his fall by extending his L shoulder  Patient has had left anterior shoulder pain since his fall  Has been taking Tylenol at home without relief  No head strike or LOC during the fall  Denies chest pain, shortness of breath, ab pain, urinary symptoms, URI symptoms, fevers or chills  Otherwise feels in his normal state of health  Prior to Admission Medications   Prescriptions Last Dose Informant Patient Reported? Taking? Blood Glucose Monitoring Suppl (True Metrix Meter) w/Device KIT   No No   Sig: Use to test blood sugars 3 times daily   Blood Pressure Monitoring (BLOOD PRESSURE CUFF) MISC   No No   Sig: Use to check blood pressure before taking blood pressure medication and 1 hour after and follow instructions provided in discharge instructions based on the readings     Cholecalciferol (Vitamin D3) 1 25 MG (14919 UT) CAPS   No No   Sig: TAKE 1 CAPSULE BY MOUTH ONE TIME PER WEEK   Insulin Pen Needle (BD Pen Needle Adina U/F) 32G X 4 MM MISC   No No   Sig: Use 4 times daily   Insulin Syringe-Needle U-100 (B-D INS SYRINGE 0 5CC/30GX1/2") 30G X 1/2" 0 5 ML MISC   No No   Sig: Inject once daily   Lancets Ultra Thin 30G MISC   No No   Sig: Use 3 times a day   ONETOUCH DELICA LANCETS 82O MISC   No No   Sig: by Does not apply route 3 (three) times a day   True Metrix Blood Glucose Test test strip   No No   Sig: USE 1 EACH 3 (THREE) TIMES A DAY USE AS INSTRUCTED   aspirin (ECOTRIN LOW STRENGTH) 81 mg EC tablet   Yes No   Sig: Take 81 mg by mouth daily Resume on 8/14     atorvastatin (LIPITOR) 40 mg tablet   No No   Sig: TAKE 1 TABLET BY MOUTH EVERY DAY WITH DINNER   calcium acetate (CALPHRON) 667 mg   Yes No   Sig: 3 tablets 3x per day   divalproex sodium (DEPAKOTE SPRINKLE) 125 MG capsule   No No   Sig: TAKE 2 CAPSULES (250 MG TOTAL) BY MOUTH EVERY 12 (TWELVE) HOURS   metoprolol succinate (TOPROL-XL) 25 mg 24 hr tablet   No No   Sig: Take 1 tablet (25 mg total) by mouth daily   prasugrel (EFFIENT) tablet   No No   Sig: Take 1 tablet (5 mg total) by mouth daily      Facility-Administered Medications: None       Past Medical History:   Diagnosis Date   • Cerebrovascular accident (CVA) due to thrombosis of left middle cerebral artery [facility] 7/29/2018   • Chronic kidney disease    • Diabetes mellitus (HCC)    • GERD (gastroesophageal reflux disease)    • Hypercholesteremia    • Hyperlipidemia    • Hypertension    • 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   • Neuropathy    • Obesity    • Osteomyelitis ([facility] )     last assessed 11/4/16   • PVC's (premature ventricular contractions)     sees cardiology Dr Maria Isabel camargo   • Stroke [facility])     last weeof July 2018 [address]   • TIA (transient ischemic attack) 10/28/2018       Past Surgical History:   Procedure Laterality Date   • ABDOMINAL SURGERY     • CARDIAC CATHETERIZATION N/A 5/2/2022    Procedure: Cardiac Coronary Angiogram;  Surgeon: Hilairo Grigsby MD;  [facility]; Service: Cardiology   • CARDIAC CATHETERIZATION N/A 5/2/2022    Procedure: Cardiac pci;  Surgeon: Hilario Grigsby MD;  [facility]; Service: Cardiology   • CARDIAC CATHETERIZATION  2/1/2023    Procedure: Cardiac catheterization;  Surgeon: Hilario Grigsby MD;  [facility]; Service: Cardiology   • CARDIAC CATHETERIZATION N/A 2/1/2023    Procedure: Cardiac pci;  Surgeon: Hilario Grigsby MD;  [facility];   Service: Cardiology   • CARDIAC CATHETERIZATION N/A 2/1/2023    Procedure: Cardiac Coronary Angiogram;  Surgeon: Brian Coreas MD;  [facility]; Service: Cardiology   • CARDIAC CATHETERIZATION N/A 2/1/2023    Procedure: Cardiac other-IVUS;  Surgeon: Brian Coreas MD;  [facility]; Service: Cardiology   • CHOLECYSTECTOMY      Percutaneous   • COLONOSCOPY     • CYSTOSCOPY     • OTHER SURGICAL HISTORY      "stimulator to control bowel movements"   • MS ESOPHAGOGASTRODUODENOSCOPY TRANSORAL DIAGNOSTIC N/A 9/27/2016    Procedure: ESOPHAGOGASTRODUODENOSCOPY (EGD); Surgeon: Paco Ibarra MD;  Location: [facility] GI LAB; Service: Gastroenterology   • MS LAPAROSCOPY SURG CHOLECYSTECTOMY N/A 2/29/2016    Procedure: LAPAROSCOPIC CHOLECYSTECTOMY ;  Surgeon: Sue Carrillo DO;  Location: [facility] Main OR;  Service: General   • ROTATOR CUFF REPAIR Right    • TOE AMPUTATION Right 10/28/2016    Procedure: 3RD TOE AMPUTATION ;  Surgeon: Zay Rizo DPM;  Location: [facility];  Service:        Family History   Problem Relation Age of Onset   • Leukemia Mother    • Liver disease Mother    • Lung cancer Mother         heavy smoker - 3 ppd   • Heart disease Father    • Liver disease Father    • Multiple myeloma Sister    • Breast cancer Sister    • Urolithiasis Family    • Alcohol abuse Neg Hx    • Depression Neg Hx    • Drug abuse Neg Hx    • Substance Abuse Neg Hx    • Mental illness Neg Hx      I have reviewed and agree with the history as documented  E-Cigarette/Vaping   • E-Cigarette Use Never User      E-Cigarette/Vaping Substances   • Nicotine No    • THC No    • CBD No    • Flavoring No    • Other No    • Unknown No      Social History     Tobacco Use   • Smoking status: Never   • Smokeless tobacco: Never   Vaping Use   • Vaping Use: Never used   Substance Use Topics   • Alcohol use: Not Currently   • Drug use: No        Review of Systems   Constitutional: Negative for chills and fever     HENT: Negative for ear pain and sore throat  Eyes: Negative for pain and visual disturbance  Respiratory: Negative for cough and shortness of breath  Cardiovascular: Negative for chest pain and palpitations  Gastrointestinal: Negative for abdominal pain and vomiting  Genitourinary: Negative for dysuria and hematuria  Musculoskeletal: Negative for arthralgias and back pain  Left anterior shoulder pain   Skin: Negative for color change and rash  Neurological: Negative for seizures and syncope  All other systems reviewed and are negative  Physical Exam  ED Triage Vitals   Temperature Pulse Respirations Blood Pressure SpO2   03/06/23 1656 03/06/23 1654 03/06/23 1654 03/06/23 1654 03/06/23 1654   98 2 °F (36 8 °C) 81 18 129/60 98 %      Temp Source Heart Rate Source Patient Position - Orthostatic VS BP Location FiO2 (%)   03/06/23 1656 03/06/23 1654 03/06/23 2135 03/06/23 1654 --   Oral Monitor Lying Right arm       Pain Score       03/06/23 1654       5             Orthostatic Vital Signs  Vitals:    03/06/23 1654 03/06/23 2135   BP: 129/60 140/76   Pulse: 81 81   Patient Position - Orthostatic VS:  Lying       Physical Exam  Vitals and nursing note reviewed  Constitutional:       General: He is not in acute distress  Appearance: He is well-developed  HENT:      Head: Normocephalic and atraumatic  Mouth/Throat:      Mouth: Mucous membranes are moist       Pharynx: Oropharynx is clear  Eyes:      Conjunctiva/sclera: Conjunctivae normal       Pupils: Pupils are equal, round, and reactive to light  Cardiovascular:      Rate and Rhythm: Normal rate and regular rhythm  Heart sounds: No murmur heard  Comments: LVAD pt; cannot evaluate pulses  Pulmonary:      Effort: Pulmonary effort is normal  No respiratory distress  Breath sounds: Normal breath sounds  Abdominal:      Palpations: Abdomen is soft  Tenderness: There is no abdominal tenderness     Musculoskeletal:         General: No swelling  Right shoulder: Normal       Left shoulder: Tenderness present  Decreased range of motion  Cervical back: Neck supple  Right lower leg: No edema  Left lower leg: No edema  Comments: L shoulder: anterior tenderness, (+) cross-arm test  No overlying swelling, skin changes, warmth or erythema  Capillary refill < 2 seconds in all digits  Normal 'ok' sign, normal finger abduction, adduction  Normal ROM of L elbow, wrist and L digits  Skin:     General: Skin is warm and dry  Capillary Refill: Capillary refill takes less than 2 seconds  Neurological:      Mental Status: He is alert     Psychiatric:         Mood and Affect: Mood normal          ED Medications  Medications   oxyCODONE-acetaminophen (PERCOCET) 5-325 mg per tablet 1 tablet (1 tablet Oral Given 3/6/23 1813)   lidocaine-epinephrine (XYLOCAINE/EPINEPHRINE) 1 %-1:100,000 injection 20 mL (20 mL Infiltration Given 3/6/23 2103)       Diagnostic Studies  Results Reviewed     Procedure Component Value Units Date/Time    C-reactive protein [[id number]]  (Abnormal) Collected: 03/06/23 2028    Lab Status: Final result Specimen: Blood from Arm, Right Updated: 03/06/23 2100      1 mg/L     Comprehensive metabolic panel [[id number]]  (Abnormal) Collected: 03/06/23 2028    Lab Status: Final result Specimen: Blood from Arm, Right Updated: 03/06/23 2100     Sodium 137 mmol/L      Potassium 4 2 mmol/L      Chloride 94 mmol/L      CO2 32 mmol/L      ANION GAP 11 mmol/L      BUN 28 mg/dL      Creatinine 4 72 mg/dL      Glucose 113 mg/dL      Calcium 9 7 mg/dL      AST 8 U/L      ALT 6 U/L      Alkaline Phosphatase 57 U/L      Total Protein 7 3 g/dL      Albumin 4 0 g/dL      Total Bilirubin 0 78 mg/dL      eGFR 12 ml/min/1 73sq m     Narrative:      [place] guidelines for Chronic Kidney Disease (CKD):   •  Stage 1 with normal or high GFR (GFR > 90 mL/min/1 73 square meters)  •  Stage 2 Mild CKD (GFR = 60-89 mL/min/1 73 square meters)  •  Stage 3A Moderate CKD (GFR = 45-59 mL/min/1 73 square meters)  •  Stage 3B Moderate CKD (GFR = 30-44 mL/min/1 73 square meters)  •  Stage 4 Severe CKD (GFR = 15-29 mL/min/1 73 square meters)  •  Stage 5 End Stage CKD (GFR <15 mL/min/1 73 square meters)  Note: GFR calculation is accurate only with a steady state creatinine    Sedimentation rate, automated [[id number]]  (Abnormal) Collected: 03/06/23 2028    Lab Status: Final result Specimen: Blood from Arm, Right Updated: 03/06/23 2047     Sed Rate 53 mm/hour     CBC and differential [[id number]]  (Abnormal) Collected: 03/06/23 2028    Lab Status: Final result Specimen: Blood from Arm, Right Updated: 03/06/23 2040     WBC 8 08 Thousand/uL      RBC 3 48 Million/uL      Hemoglobin 10 6 g/dL      Hematocrit 33 0 %      MCV 95 fL      MCH 30 5 pg      MCHC 32 1 g/dL      RDW 15 3 %      MPV 9 7 fL      Platelets 079 Thousands/uL      nRBC 0 /100 WBCs      Neutrophils Relative 73 %      Immat GRANS % 1 %      Lymphocytes Relative 12 %      Monocytes Relative 14 %      Eosinophils Relative 0 %      Basophils Relative 0 %      Neutrophils Absolute 5 87 Thousands/µL      Immature Grans Absolute 0 06 Thousand/uL      Lymphocytes Absolute 0 95 Thousands/µL      Monocytes Absolute 1 15 Thousand/µL      Eosinophils Absolute 0 02 Thousand/µL      Basophils Absolute 0 03 Thousands/µL                  XR shoulder 2+ views LEFT   ED Interpretation by Teo Hopson MD (03/06 1920)   NAD      CT upper extremity wo contrast left    (Results Pending)         Procedures  POC MSK/Soft Tissue US    Date/Time: 3/6/2023 7:51 PM  Performed by: Madeleine Dugan MD  Authorized by: Madeleine Dugan MD     Patient location:  ED  Performed by:  Resident  Other Assisting Provider: Yes (comment) (Dr Ben Mobley)    Procedure:     Performed: musculoskeletal ultrasound    Procedure details:     Exam Type:  Diagnostic    Longitudinal view:  Obtained    Transverse view:  Obtained    Image quality: limited diagnostic      Image availability:  Images available in PACS  MSK ultrasound:     MSK indications: pain, swelling and limited range of motion      MSK assessment of:  Muscle, tendon, joint and bone (for fractures)    MSK extremities evaluated: left upper extremity      Fluid Collection: Absent      Joint Effusion: Present (comment on size)      Tendon Injury: Absent      Fractured bone: Absent      Muscle Injury: Absent    Interpretation:     MSK impressions: abnormal ultrasound (see above)            ED Course  ED Course as of 03/06/23 2143   Mon Mar 06, 2023   2047 Sed Rate(!): 48                                       Medical Decision Making  29-year-old male with anterior left shoulder pain due to mechanical trip and fall 3 days ago  Left shoulder x-ray normal   Bedside ultrasound left shoulder showed mild joint effusion  Labs collected due to concern for septic arthritis due to severe pain with passive and active ROM  Patient signed out to Dr Clarice Sanchez pending CT upper extremity read and dispo    Amount and/or Complexity of Data Reviewed  Independent Historian:      Details: HPI from pt and wife at bedside  Labs: ordered  Decision-making details documented in ED Course  Radiology: ordered and independent interpretation performed  Risk  Prescription drug management  Disposition  Final diagnoses:   None     ED Disposition     None      Follow-up Information    None         Patient's Medications   Discharge Prescriptions    No medications on file     No discharge procedures on file  PDMP Review       Value Time User    PDMP Reviewed  Yes 11/12/2022  6:19 AM Melissa Fung MD           ED Provider  Attending physically available and evaluated Cholo Nuria CUNHA managed the patient along with the ED Attending      Electronically Signed by         Roxie Payne MD  03/06/23 1554

## 2023-03-07 ENCOUNTER — VBI (OUTPATIENT)
Dept: INTERNAL MEDICINE CLINIC | Facility: CLINIC | Age: 63
End: 2023-03-07

## 2023-03-07 ENCOUNTER — CLINICAL SUPPORT (OUTPATIENT)
Dept: CARDIAC REHAB | Facility: CLINIC | Age: 63
End: 2023-03-07

## 2023-03-07 ENCOUNTER — OFFICE VISIT (OUTPATIENT)
Dept: PODIATRY | Facility: CLINIC | Age: 63
End: 2023-03-07

## 2023-03-07 VITALS
WEIGHT: 212 LBS | SYSTOLIC BLOOD PRESSURE: 117 MMHG | HEIGHT: 69 IN | BODY MASS INDEX: 31.4 KG/M2 | HEART RATE: 74 BPM | DIASTOLIC BLOOD PRESSURE: 69 MMHG

## 2023-03-07 DIAGNOSIS — E11.42 DIABETIC POLYNEUROPATHY ASSOCIATED WITH TYPE 2 DIABETES MELLITUS (HCC): Primary | ICD-10-CM

## 2023-03-07 DIAGNOSIS — M20.41 HAMMER TOES OF BOTH FEET: ICD-10-CM

## 2023-03-07 DIAGNOSIS — I21.4 TYPE 1 NON-ST ELEVATION MYOCARDIAL INFARCTION (NSTEMI) (HCC): Primary | ICD-10-CM

## 2023-03-07 DIAGNOSIS — M20.42 HAMMER TOES OF BOTH FEET: ICD-10-CM

## 2023-03-07 DIAGNOSIS — Z89.421 ABSENCE OF TOE OF RIGHT FOOT (HCC): ICD-10-CM

## 2023-03-07 LAB
ATRIAL RATE: 79 BPM
P AXIS: 32 DEGREES
PR INTERVAL: 150 MS
QRS AXIS: -9 DEGREES
QRSD INTERVAL: 148 MS
QT INTERVAL: 470 MS
QTC INTERVAL: 538 MS
T WAVE AXIS: 88 DEGREES
VENTRICULAR RATE: 79 BPM

## 2023-03-07 NOTE — ED CARE HANDOFF
Emergency Department Sign Out Note        Sign out and transfer of care from Dr Juarez See  See Separate Emergency Department note  The patient, Ria Cast, was evaluated by the previous provider for left shoulder pain  Workup Completed:  Labs    ED Course / Workup Pending (followup):  Pending CT                                  ED Course as of 03/07/23 0104   Mon Mar 06, 2023   2038 S/O: left shoulder pain-- plan for discharge if CT and labs are normal  -- r/o septic arthritis  2104 C-REACTIVE PROTEIN(!): 156 1   2104 Sed Rate(!): 53   2153 CT shows: OSSEOUS STRUCTURES:  No fracture, dislocation or destructive osseous lesion  Mild glenohumeral and acromioclavicular osteoarthritis  No joint effusion identified  2229 Inflammatory markers chronically elevated  No evidence of acute septic arthritis  Plan discharge home  Procedures  MDM        Disposition  Final diagnoses:   Left shoulder strain, initial encounter     Time reflects when diagnosis was documented in both MDM as applicable and the Disposition within this note     Time User Action Codes Description Comment    3/6/2023 10:27 PM Robson Gomez Add [[id number]] Left shoulder strain, initial encounter       ED Disposition     ED Disposition   Discharge    Condition   Stable    Date/Time   Mon Mar 6, 2023 10:26 PM    [place]der discharge to home/self care  Follow-up Information     Follow up With Specialties Details Why Contact Idris Hale, DO Internal Medicine  As needed [phone]   [facility] [phone]          Discharge Medication List as of 3/6/2023 10:27 PM      CONTINUE these medications which have NOT CHANGED    Details   aspirin (ECOTRIN LOW STRENGTH) 81 mg EC tablet Take 81 mg by mouth daily Resume on 8/14  , Historical Med      atorvastatin (LIPITOR) 40 mg tablet TAKE 1 TABLET BY MOUTH EVERY DAY WITH DINNER, Normal      Blood Glucose Monitoring Suppl (True Metrix Meter) w/Device KIT Use to test blood sugars 3 times daily, Normal      Blood Pressure Monitoring (BLOOD PRESSURE CUFF) MISC Use to check blood pressure before taking blood pressure medication and 1 hour after and follow instructions provided in discharge instructions based on the readings  , Print      calcium acetate (CALPHRON) 667 mg 3 tablets 3x per day, Historical Med      Cholecalciferol (Vitamin D3) 1 25 MG (20988 UT) CAPS TAKE 1 CAPSULE BY MOUTH ONE TIME PER WEEK, Normal      divalproex sodium (DEPAKOTE SPRINKLE) 125 MG capsule TAKE 2 CAPSULES (250 MG TOTAL) BY MOUTH EVERY 12 (TWELVE) HOURS, Starting Thu 2/16/2023, Normal      Insulin Pen Needle (BD Pen Needle Adina U/F) 32G X 4 MM MISC Use 4 times daily, Normal      Insulin Syringe-Needle U-100 (B-D INS SYRINGE 0 5CC/30GX1/2") 30G X 1/2" 0 5 ML MISC Inject once daily, Normal      !! Lancets Ultra Thin 30G MISC Use 3 times a day, Normal      metoprolol succinate (TOPROL-XL) 25 mg 24 hr tablet Take 1 tablet (25 mg total) by mouth daily, Starting Tue 2/14/2023, Normal      !! ONETOUCH DELICA LANCETS 75T MISC by Does not apply route 3 (three) times a day, Starting Tue 11/27/2018, Normal      prasugrel (EFFIENT) tablet Take 1 tablet (5 mg total) by mouth daily, Starting Tue 2/14/2023, Normal      True Metrix Blood Glucose Test test strip USE 1 EACH 3 (THREE) TIMES A DAY USE AS INSTRUCTED, Starting Tue 2/7/2023, Normal       !! - Potential duplicate medications found  Please discuss with provider  No discharge procedures on file         ED Provider  Electronically Signed by     Rocco Lamas MD  03/07/23 7137

## 2023-03-07 NOTE — PROGRESS NOTES
PATIENT:  Agatha Montez    1960      ASSESSMENT:     1  Diabetic polyneuropathy associated with type 2 diabetes mellitus ([facility] )  Diabetic Shoe Inserts    Diabetic Shoe      2  Absence of toe of right foot ([facility] )  Diabetic Shoe Inserts    Diabetic Shoe      3  Hammer toes of both feet  Diabetic Shoe Inserts    Diabetic Shoe          PLAN:  1  Patient was counseled on the condition and diagnosis  Educated disease prevention and risks related to diabetes  2  Educated proper daily foot care and exam   Instructed proper skin care / protection and footwear  Instructed to identify any signs of infection and related foot problem  Nails debrided  3  The recent blood work was reviewed / discussed and the last HbA1c was 5 5  Discussed proper blood glucose control with diet and exercise  4  He has high risk diabetic foot and recommended diabetic shoes  Referred him to [facility]  5  The patient will return in 9 weeks  Subjective:      HPI:   The patients presents for diabetic foot exam   He has high risk diabetic foot with history of ulcer and amputation  No ulcer in his feet  BS is under control  No acute pedal problems  The following portions of the patient's history were reviewed and updated as appropriate: allergies, current medications, past family history, past medical history, past social history, past surgical history and problem list   All pertinent labs and images were reviewed      Past Medical History  Past Medical History:   Diagnosis Date   • Cerebrovascular accident (CVA) due to thrombosis of left middle cerebral artery [facility] ) 7/29/2018   • Chronic kidney disease    • Diabetes mellitus ([facility] )    • GERD (gastroesophageal reflux disease)    • Hypercholesteremia    • Hyperlipidemia    • Hypertension    • 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   • Neuropathy    • Obesity    • Osteomyelitis ([facility] )     last assessed 11/4/16   • PVC's (premature ventricular contractions)     sees cardiology Dr Garcia [place] cam[place]   • Stroke [facility])     last weeof July 2018 [facility]   • TIA (transient ischemic attack) 10/28/2018       Past Surgical History  Past Surgical History:   Procedure Laterality Date   • ABDOMINAL SURGERY     • CARDIAC CATHETERIZATION N/A 5/2/2022    Procedure: Cardiac Coronary Angiogram;  Surgeon: Donna Blandon MD;  [facility]: [facility] CARDIAC [facility]ATH [facility]; Service: Cardiology   • CARDIAC CATHETERIZATION N/A 5/2/2022    Procedure: Cardiac pci;  Surgeon: Donna Blandon MD;  [facility] CARD[facility]AC [facility]; Service: Cardiology   • CARDIAC CATHETERIZATION  2/1/2023    Procedure: Cardiac catheterization;  Surgeon: Donna Blandon MD;  [facility]: [facility] CARDIAC CATH [facility]; Service: Cardiology   • CARDIAC CATHETERIZATION N/A 2/1/2023    Procedure: Cardiac pci;  Surgeon: Donna Blandon MD;  Location: [facility] CARDIAC CATH [facility]; Service: Cardiology   • CARDIAC CATHETERIZATION N/A 2/1/2023    Procedure: Cardiac Coronary Angiogram;  Surgeon: Donna Blandon MD;  Location: [facility] CARDIAC CATH [facility]; Service: Cardiology   • CARDIAC CATHETERIZATION N/A 2/1/2023    Procedure: Cardiac other-IVUS;  Surgeon: Donna Blandon MD;  Location: [facility] CARDIAC CATH [facility]; Service: Cardiology   • CHOLECYSTECTOMY      Percutaneous   • COLONOSCOPY     • CYSTOSCOPY     • OTHER SURGICAL HISTORY      "stimulator to control bowel movements"   • GA ESOPHAGOGASTRODUODENOSCOPY TRANSORAL DIAGNOSTIC N/A 9/27/2016    Procedure: ESOPHAGOGASTRODUODENOSCOPY (EGD); Surgeon: Ada Khan MD;  Location: [facility] GI LAB; Service: Gastroenterology   • GA LAPAROSCOPY SURG CHOLECYSTECTOMY N/A 2/29/2016    Procedure: LAPAROSCOPIC CHOLECYSTECTOMY ;  Surgeon: Haily Fontaine DO;  Location: [facility] Main OR;  Service: General   • ROTATOR CUFF REPAIR Right    • TOE AMPUTATION Right 10/28/2016    Procedure: 3RD TOE AMPUTATION ;  Surgeon: Darylene Bleak, DPM;  Location: [facility] Main OR;  Service:         Allergies:  Patient has no known allergies      Medications:  Current Outpatient Medications   Medication Sig Dispense Refill   • aspirin (ECOTRIN LOW STRENGTH) 81 mg EC tablet Take 81 mg by mouth daily Resume on 8/14       • atorvastatin (LIPITOR) 40 mg tablet TAKE 1 TABLET BY MOUTH EVERY DAY WITH DINNER 90 tablet 1   • Blood Glucose Monitoring Suppl (True Metrix Meter) w/Device KIT Use to test blood sugars 3 times daily 1 kit 0   • Blood Pressure Monitoring (BLOOD PRESSURE CUFF) MISC Use to check blood pressure before taking blood pressure medication and 1 hour after and follow instructions provided in discharge instructions based on the readings  1 each 0   • calcium acetate (CALPHRON) 667 mg 3 tablets 3x per day     • Cholecalciferol (Vitamin D3) 1 25 MG (08627 UT) CAPS TAKE 1 CAPSULE BY MOUTH ONE TIME PER WEEK 12 capsule 2   • divalproex sodium (DEPAKOTE SPRINKLE) 125 MG capsule TAKE 2 CAPSULES (250 MG TOTAL) BY MOUTH EVERY 12 (TWELVE) HOURS 360 capsule 1   • Insulin Pen Needle (BD Pen Needle Adina U/F) 32G X 4 MM MISC Use 4 times daily 400 each 1   • Insulin Syringe-Needle U-100 (B-D INS SYRINGE 0 5CC/30GX1/2") 30G X 1/2" 0 5 ML MISC Inject once daily 100 each 1   • metoprolol succinate (TOPROL-XL) 25 mg 24 hr tablet Take 1 tablet (25 mg total) by mouth daily 90 tablet 3   • ONETOUCH DELICA LANCETS 73N MISC by Does not apply route 3 (three) times a day 270 each 1   • prasugrel (EFFIENT) tablet Take 1 tablet (5 mg total) by mouth daily 90 tablet 3   • True Metrix Blood Glucose Test test strip USE 1 EACH 3 (THREE) TIMES A DAY USE AS INSTRUCTED 300 strip 1   • Lancets Ultra Thin 30G MISC Use 3 times a day 300 each 0     No current facility-administered medications for this visit         Social History:  Social History     Socioeconomic History   • Marital status: /Civil Union     Spouse name: None   • Number of children: None   • Years of education: None   • Highest education level: Not asked   Occupational History   • Occupation: disabled   Tobacco Use   • Smoking status: Never   • Smokeless tobacco: Never   Vaping Use   • Vaping Use: Never used   Substance and Sexual Activity   • Alcohol use: Not Currently   • Drug use: No   • Sexual activity: Not Currently   Other Topics Concern   • None   Social History Narrative    Daily caffeine consumption 2-3 servings a day     Social Determinants of Health     Financial Resource Strain: Not on file   Food Insecurity: No Food Insecurity   • Worried About Running Out of Food in the Last Year: Never true   • Ran Out of Food in the Last Year: Never true   Transportation Needs: No Transportation Needs   • Lack of Transportation (Medical): No   • Lack of Transportation (Non-Medical): No   Physical Activity: Not on file   Stress: Not on file   Social Connections: Not on file   Intimate Partner Violence: Not on file   Housing Stability: Low Risk    • Unable to Pay for Housing in the Last Year: No   • Number of Places Lived in the Last Year: 1   • Unstable Housing in the Last Year: No        Review of Systems   Constitutional: Negative for chills and fever  Respiratory: Negative for cough and shortness of breath  Cardiovascular: Negative for chest pain  Gastrointestinal: Negative for constipation, diarrhea, nausea and vomiting  Neurological: Positive for numbness  Objective:      /69 (BP Location: Left arm, Patient Position: Sitting, Cuff Size: Standard)   Pulse 74   Ht 5' 9" (1 753 m)   Wt 96 2 kg (212 lb)   BMI 31 31 kg/m²          Physical Exam  Vitals reviewed  Constitutional:       General: He is not in acute distress  Appearance: He is well-developed  He is not toxic-appearing or diaphoretic  Cardiovascular:      Rate and Rhythm: Normal rate and regular rhythm  Pulses: no weak pulses          Dorsalis pedis pulses are 1+ on the right side and 1+ on the left side  Posterior tibial pulses are 0 on the right side and 0 on the left side     Pulmonary:      Effort: Pulmonary effort is normal  No respiratory distress  Musculoskeletal:         General: Deformity present  No tenderness  Right foot: No foot drop  Left foot: No foot drop  Comments: Hammertoe deformity noted  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  Feet:      Right foot:      Skin integrity: Dry skin present  No ulcer, skin breakdown, erythema, warmth or callus  Left foot:      Skin integrity: Dry skin present  No ulcer, skin breakdown, erythema, warmth or callus  Skin:     General: Skin is warm  Capillary Refill: Capillary refill takes less than 2 seconds  Coloration: Skin is not cyanotic or mottled  Findings: No ecchymosis or erythema  Rash is not purpuric  Nails: There is no clubbing  Comments: Thick mycotic nails X 7 with discoloration and onycholysis  He has class finding with previous toe amputation  Neurological:      General: No focal deficit present  Mental Status: He is alert and oriented to person, place, and time  Cranial Nerves: No cranial nerve deficit  Sensory: Sensory deficit present  Motor: No weakness  Psychiatric:         Mood and Affect: Mood normal          Behavior: Behavior normal          Thought Content: Thought content normal          Judgment: Judgment normal          Diabetic Foot Exam    Patient's shoes and socks removed  Right Foot/Ankle   Right Foot Inspection  Skin Exam: dry skin  No warmth, no callus, no erythema, no maceration, no ulcer and no callus  Toe Exam: right toe deformity  No swelling, no tenderness and erythema    Sensory   Vibration: diminished  Proprioception: intact  Monofilament testing: diminished    Vascular  Capillary refills: < 3 seconds  The right DP pulse is 1+  The right PT pulse is 0  Left Foot/Ankle  Left Foot Inspection  Skin Exam: dry skin  No warmth, no erythema, no maceration, no ulcer and no callus  Toe Exam: left toe deformity  No swelling, no tenderness and no erythema       Sensory   Vibration: diminished  Proprioception: intact  Monofilament testing: diminished    Vascular  Capillary refills: < 3 seconds  The left DP pulse is 1+  The left PT pulse is 0       Assign Risk Category  Deformity present  Loss of protective sensation  No weak pulses  Risk: 3

## 2023-03-07 NOTE — TELEPHONE ENCOUNTER
Irma Williamson    ED Visit Information     Ed visit date: 3/6/2023  Diagnosis Description: Shoulder Pain   In Network? Yes [address] [facility]  Discharge status: Home  Discharged with meds ? Yes  Number of ED visits to date: 1  ED Severity:3     Outreach Information    Outreach successful: Yes 1  Date letter mailed:no need  Date Finalized:3/7/2023    Care Coordination    Follow up appointment with pcp: no patient canceled appt and will call to rescedule when needed  Transportation issues ? No    [employer] type: 7 Day Outreach  [facility] PCP: Yes  Transportation: Friend/Family Transport  Ambulance - Was Pt given choice of of ED: No  If able to choose an ED   Would you have chosen [facility]: Yes  Called PCP first?: No  Feels able to call PCP for urgent problems ?: Yes  Understands what emergencies can be handled by PCP ?: Yes  Ever any problems getting appointment with PCP for minor emergency/urgency problems?: No  Practice Contacted Patient ?: No  Pt had ED follow up with pcp/staff ?: No    Seen for follow-up out of network ?: No  Reason Patient went to ED instead of Urgent Care or PCP?: Perceived Severity of Illness  Date/Time Type Contact Phone/Fax         03/07/2023 10:29 AM EST by Tano Brewster MA 03/07/2023 10:29 AM EST by REJI Cordova () [phone] ([facility])[phone] (Mobile)  [phone] (Mobile)       Remove    - Communicated - reviewed ED questions

## 2023-03-08 ENCOUNTER — HOSPITAL ENCOUNTER (INPATIENT)
Facility: HOSPITAL | Age: 63
LOS: 1 days | Discharge: HOME | End: 2023-03-10
Attending: EMERGENCY MEDICINE | Admitting: STUDENT IN AN ORGANIZED HEALTH CARE EDUCATION/TRAINING PROGRAM

## 2023-03-08 ENCOUNTER — APPOINTMENT (EMERGENCY)
Dept: RADIOLOGY | Facility: HOSPITAL | Age: 63
End: 2023-03-08

## 2023-03-08 DIAGNOSIS — I25.10 CAD, MULTIPLE VESSEL: ICD-10-CM

## 2023-03-08 DIAGNOSIS — E11.22 TYPE 2 DIABETES MELLITUS WITH CHRONIC KIDNEY DISEASE ON CHRONIC DIALYSIS, WITH LONG-TERM CURRENT USE OF INSULIN (HCC): ICD-10-CM

## 2023-03-08 DIAGNOSIS — Z99.2 TYPE 2 DIABETES MELLITUS WITH CHRONIC KIDNEY DISEASE ON CHRONIC DIALYSIS, WITH LONG-TERM CURRENT USE OF INSULIN (HCC): ICD-10-CM

## 2023-03-08 DIAGNOSIS — R07.9 CHEST PAIN, UNSPECIFIED: Primary | ICD-10-CM

## 2023-03-08 DIAGNOSIS — Z99.2 ESRD ON DIALYSIS (HCC): ICD-10-CM

## 2023-03-08 DIAGNOSIS — I21.4 TYPE 1 NON-ST ELEVATION MYOCARDIAL INFARCTION (NSTEMI) (HCC): ICD-10-CM

## 2023-03-08 DIAGNOSIS — N18.6 TYPE 2 DIABETES MELLITUS WITH CHRONIC KIDNEY DISEASE ON CHRONIC DIALYSIS, WITH LONG-TERM CURRENT USE OF INSULIN (HCC): ICD-10-CM

## 2023-03-08 DIAGNOSIS — N18.6 ESRD ON DIALYSIS (HCC): ICD-10-CM

## 2023-03-08 DIAGNOSIS — Z79.4 TYPE 2 DIABETES MELLITUS WITH CHRONIC KIDNEY DISEASE ON CHRONIC DIALYSIS, WITH LONG-TERM CURRENT USE OF INSULIN (HCC): ICD-10-CM

## 2023-03-08 LAB
2HR DELTA HS TROPONIN: 17 NG/L
4HR DELTA HS TROPONIN: 10 NG/L
ANION GAP SERPL CALCULATED.3IONS-SCNC: 5 MMOL/L (ref 4–13)
BASOPHILS # BLD AUTO: 0.02 THOUSANDS/ÂΜL (ref 0–0.1)
BASOPHILS NFR BLD AUTO: 0 % (ref 0–1)
BUN SERPL-MCNC: 39 MG/DL (ref 5–25)
CALCIUM SERPL-MCNC: 9.6 MG/DL (ref 8.3–10.1)
CARDIAC TROPONIN I PNL SERPL HS: 125 NG/L
CARDIAC TROPONIN I PNL SERPL HS: 135 NG/L
CARDIAC TROPONIN I PNL SERPL HS: 142 NG/L
CHLORIDE SERPL-SCNC: 97 MMOL/L (ref 96–108)
CO2 SERPL-SCNC: 31 MMOL/L (ref 21–32)
CREAT SERPL-MCNC: 5.21 MG/DL (ref 0.6–1.3)
EOSINOPHIL # BLD AUTO: 0.09 THOUSAND/ÂΜL (ref 0–0.61)
EOSINOPHIL NFR BLD AUTO: 2 % (ref 0–6)
ERYTHROCYTE [DISTWIDTH] IN BLOOD BY AUTOMATED COUNT: 15.1 % (ref 11.6–15.1)
GFR SERPL CREATININE-BSD FRML MDRD: 10 ML/MIN/1.73SQ M
GLUCOSE SERPL-MCNC: 118 MG/DL (ref 65–140)
GLUCOSE SERPL-MCNC: 166 MG/DL (ref 65–140)
HCT VFR BLD AUTO: 34.9 % (ref 36.5–49.3)
HGB BLD-MCNC: 10.8 G/DL (ref 12–17)
IMM GRANULOCYTES # BLD AUTO: 0.03 THOUSAND/UL (ref 0–0.2)
IMM GRANULOCYTES NFR BLD AUTO: 1 % (ref 0–2)
LYMPHOCYTES # BLD AUTO: 1.04 THOUSANDS/ÂΜL (ref 0.6–4.47)
LYMPHOCYTES NFR BLD AUTO: 23 % (ref 14–44)
MCH RBC QN AUTO: 29.5 PG (ref 26.8–34.3)
MCHC RBC AUTO-ENTMCNC: 30.9 G/DL (ref 31.4–37.4)
MCV RBC AUTO: 95 FL (ref 82–98)
MONOCYTES # BLD AUTO: 0.5 THOUSAND/ÂΜL (ref 0.17–1.22)
MONOCYTES NFR BLD AUTO: 11 % (ref 4–12)
NEUTROPHILS # BLD AUTO: 2.9 THOUSANDS/ÂΜL (ref 1.85–7.62)
NEUTS SEG NFR BLD AUTO: 63 % (ref 43–75)
NRBC BLD AUTO-RTO: 0 /100 WBCS
PLATELET # BLD AUTO: 138 THOUSANDS/UL (ref 149–390)
PMV BLD AUTO: 10.2 FL (ref 8.9–12.7)
POTASSIUM SERPL-SCNC: 4.3 MMOL/L (ref 3.5–5.3)
RBC # BLD AUTO: 3.66 MILLION/UL (ref 3.88–5.62)
SODIUM SERPL-SCNC: 133 MMOL/L (ref 135–147)
WBC # BLD AUTO: 4.58 THOUSAND/UL (ref 4.31–10.16)

## 2023-03-08 RX ORDER — PRASUGREL 10 MG/1
5 TABLET, FILM COATED ORAL DAILY
Status: DISCONTINUED | OUTPATIENT
Start: 2023-03-09 | End: 2023-03-10 | Stop reason: HOSPADM

## 2023-03-08 RX ORDER — ACETAMINOPHEN 325 MG/1
650 TABLET ORAL EVERY 4 HOURS PRN
Status: DISCONTINUED | OUTPATIENT
Start: 2023-03-08 | End: 2023-03-10 | Stop reason: HOSPADM

## 2023-03-08 RX ORDER — INSULIN LISPRO 100 [IU]/ML
1-5 INJECTION, SOLUTION INTRAVENOUS; SUBCUTANEOUS
Status: DISCONTINUED | OUTPATIENT
Start: 2023-03-09 | End: 2023-03-10 | Stop reason: HOSPADM

## 2023-03-08 RX ORDER — HEPARIN SODIUM 5000 [USP'U]/ML
5000 INJECTION, SOLUTION INTRAVENOUS; SUBCUTANEOUS EVERY 8 HOURS SCHEDULED
Status: DISCONTINUED | OUTPATIENT
Start: 2023-03-08 | End: 2023-03-10 | Stop reason: HOSPADM

## 2023-03-08 RX ORDER — ATORVASTATIN CALCIUM 40 MG/1
40 TABLET, FILM COATED ORAL
Status: DISCONTINUED | OUTPATIENT
Start: 2023-03-08 | End: 2023-03-10 | Stop reason: HOSPADM

## 2023-03-08 RX ORDER — METOPROLOL SUCCINATE 25 MG/1
25 TABLET, EXTENDED RELEASE ORAL DAILY
Status: DISCONTINUED | OUTPATIENT
Start: 2023-03-09 | End: 2023-03-10

## 2023-03-08 RX ORDER — POLYETHYLENE GLYCOL 3350 17 G/17G
17 POWDER, FOR SOLUTION ORAL DAILY PRN
Status: DISCONTINUED | OUTPATIENT
Start: 2023-03-08 | End: 2023-03-10 | Stop reason: HOSPADM

## 2023-03-08 RX ORDER — SODIUM CHLORIDE 9 MG/ML
3 INJECTION INTRAVENOUS
Status: DISCONTINUED | OUTPATIENT
Start: 2023-03-08 | End: 2023-03-10 | Stop reason: HOSPADM

## 2023-03-08 RX ORDER — DIVALPROEX SODIUM 125 MG/1
250 CAPSULE, COATED PELLETS ORAL EVERY 12 HOURS SCHEDULED
Status: DISCONTINUED | OUTPATIENT
Start: 2023-03-08 | End: 2023-03-10 | Stop reason: HOSPADM

## 2023-03-08 RX ORDER — ASPIRIN 81 MG/1
81 TABLET ORAL DAILY
Status: DISCONTINUED | OUTPATIENT
Start: 2023-03-09 | End: 2023-03-10 | Stop reason: HOSPADM

## 2023-03-08 RX ORDER — INSULIN LISPRO 100 [IU]/ML
1-5 INJECTION, SOLUTION INTRAVENOUS; SUBCUTANEOUS
Status: DISCONTINUED | OUTPATIENT
Start: 2023-03-08 | End: 2023-03-10 | Stop reason: HOSPADM

## 2023-03-08 RX ORDER — CALCIUM ACETATE 667 MG/1
667 CAPSULE ORAL
Status: DISCONTINUED | OUTPATIENT
Start: 2023-03-08 | End: 2023-03-10 | Stop reason: HOSPADM

## 2023-03-08 RX ADMIN — HEPARIN SODIUM 5000 UNITS: 5000 INJECTION INTRAVENOUS; SUBCUTANEOUS at 21:34

## 2023-03-08 RX ADMIN — DIVALPROEX SODIUM 250 MG: 125 CAPSULE ORAL at 21:34

## 2023-03-08 RX ADMIN — ATORVASTATIN CALCIUM 40 MG: 40 TABLET, FILM COATED ORAL at 20:17

## 2023-03-08 RX ADMIN — CALCIUM ACETATE 667 MG: 667 CAPSULE ORAL at 20:17

## 2023-03-08 NOTE — ED ATTENDING ATTESTATION
3/8/2023  I, Rick Bruce MD, saw and evaluated the patient  I have discussed the patient with the resident/non-physician practitioner and agree with the resident's/non-physician practitioner's findings, Plan of Care, and MDM as documented in the resident's/non-physician practitioner's note, except where noted  All available labs and Radiology studies were reviewed  I was present for key portions of any procedure(s) performed by the resident/non-physician practitioner and I was immediately available to provide assistance  At this point I agree with the current assessment done in the Emergency Department  I have conducted an independent evaluation of this patient a history and physical is as follows:    OA: 62 y/o m with CAD s/p LAD stentLVAD, ESRD with recent NSTEMI who presents CP that began earlier today while at dialysis  PT states episode lasted approximately 30 min and described as chest pressure, nonradiating  No associated SOB, n/v, diaphoresis  States the pain is different that that experienced previously when he required stents  Of note, dialysis session was stopped slightly prematurely but per report, he met weight and no significant additional fluid needed to be taken off  PE, chronically ill appearing m in NAD at this time and pain free, VSS, Nc/AT, MMM, neck supple/FROM, RR, lungs decreased but CTAB, abd soft, +Bs, -r/g, - mass, - edema, - calf ttp, + 2 pulses, capillary refill < 2 sec, AAO  A/p chest pain with significant cardiac history  eval with labs, CXR, EKG  Pt connected to our pads  Will require admission  ED Course     Records from previous cardiac admission and outpatient reviewed  Pt with restenosis of stent in February requiring additional stenting  Also discussed with family that the pt follows with cardiology both here and in [facility] cardiologist discussed potentially doing another catheterization but unclear if and when this will be completed       Critical Care Time  Procedures

## 2023-03-08 NOTE — ASSESSMENT & PLAN NOTE
· Patient presented with chest pain during dialysis, lasted about an hour then resolved  No EKG changes from priors    · Cardiac history as listed below  · First troponin 125, second 147, continue to trend  · Given flat troponins and EKG stable from priors, low suspicion for ACS  · Trend EKGs  · Monitor on telemetry  · Cardiology consulted, appreciate recommendations

## 2023-03-08 NOTE — ED PROVIDER NOTES
History  Chief Complaint   Patient presents with   • Chest Pain     Pt started with chest pain during dialysis  Patient received two hours of dialysis until chest pain started  Patient received 324 ASA for EMS  Patient reports chest pain is down to a 3/10  Denies sob  Patient wearing life vest      Agatha Montez is a 59-year-old man with a history significant for end-stage renal disease on dialysis M/W/F, CHF on a [place], diabetes, hypertension, hyperlipidemia, presenting with chest pain which started during dialysis this morning  He was sitting when it started  It does not worsen with exertion  It is located in the middle of his chest and feels like a pressure  It does not radiate  Cannot identify any aggravating or ameliorating factors  No lower extremity edema  He was normal weight today at dialysis  He received 2 hours of dialysis treatment and was told that he does not need anymore because his weight was okay  His LifePak has not shocked him  No nausea, vomiting, shortness of breath, abdominal pain, new back pain, lower extremity edema or pain  Prior to Admission Medications   Prescriptions Last Dose Informant Patient Reported? Taking? Blood Glucose Monitoring Suppl (True Metrix Meter) w/Device KIT   No No   Sig: Use to test blood sugars 3 times daily   Blood Pressure Monitoring (BLOOD PRESSURE CUFF) MISC   No No   Sig: Use to check blood pressure before taking blood pressure medication and 1 hour after and follow instructions provided in discharge instructions based on the readings     Cholecalciferol (Vitamin D3) 1 25 MG (69266 UT) CAPS   No No   Sig: TAKE 1 CAPSULE BY MOUTH ONE TIME PER WEEK   Insulin Pen Needle (BD Pen Needle Adina U/F) 32G X 4 MM MISC   No No   Sig: Use 4 times daily   Insulin Syringe-Needle U-100 (B-D INS SYRINGE 0 5CC/30GX1/2") 30G X 1/2" 0 5 ML MISC   No No   Sig: Inject once daily   Lancets Ultra Thin 30G MISC   No No   Sig: Use 3 times a day   [facility] LANCETS 33G MISC   No No   Sig: by Does not apply route 3 (three) times a day   True Metrix Blood Glucose Test test strip   No No   Sig: USE 1 EACH 3 (THREE) TIMES A DAY USE AS INSTRUCTED   aspirin (ECOTRIN LOW STRENGTH) 81 mg EC tablet   Yes No   Sig: Take 81 mg by mouth daily Resume on 8/14     atorvastatin (LIPITOR) 40 mg tablet   No No   Sig: TAKE 1 TABLET BY MOUTH EVERY DAY WITH DINNER   calcium acetate (CALPHRON) 667 mg   Yes No   Sig: 3 tablets 3x per day   divalproex sodium (DEPAKOTE SPRINKLE) 125 MG capsule   No No   Sig: TAKE 2 CAPSULES (250 MG TOTAL) BY MOUTH EVERY 12 (TWELVE) HOURS   metoprolol succinate (TOPROL-XL) 25 mg 24 hr tablet   No No   Sig: Take 1 tablet (25 mg total) by mouth daily   prasugrel (EFFIENT) tablet   No No   Sig: Take 1 tablet (5 mg total) by mouth daily      Facility-Administered Medications: None       Past Medical History:   Diagnosis Date   • Cerebrovascular [facility] 7[facility]29/2018   • Chronic kidney disease    • Diabetes mellitus (HCC)    • GERD (gastroesophageal reflux disease)    • Hypercholesteremia    • Hyperlipidemia    • Hypertension    • 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   • Neuropathy    • Obesity    • Osteomyelitis ([facility] )     last assessed 11/4/16   • PVC's (premature ventricular contractions)     sees cardiology Dr Angela camargo   • Stroke [facility])     last weeof July 2018 [address]   • TIA (transient ischemic attack) 10/28/2018       Past Surgical History:   Procedure Laterality Date   • ABDOMINAL SURGERY     • CARDIAC CATHETERIZATION N/A 5/2/2022    Procedure: Cardiac Coronary Angiogram;  Surgeon: Austin Guardado MD;  [facility] CARDIAC [facility]; Service: Cardiology   • CARDIAC CATHETERIZATION N/A 5/2/2022    Procedure: Cardiac pci;  Surgeon: Austin Guardado MD;  [facility]: [facility] CARDIAC [facility];   Service: Cardiology   • CARDIAC CATHETERIZATION  2/1/2023    Procedure: Cardiac catheterization; Surgeon: Carina Cowart MD;  Location: [facility] CARDIAC C[facility]; Service: Cardiology   • CARDIAC CATHETERIZATION N/A 2/1/2023    Procedure: Cardiac pci;  Surgeon: Carina Cowart MD;  Location: [facility] CARDIAC CATH [facility]; Service: Cardiology   • CARDIAC CATHETERIZATION N/A 2/1/2023    Procedure: Cardiac Coronary Angiogram;  Surgeon: Carina Cowart MD;  Location: [facility] CARDIAC CATH [facility]; Service: Cardiology   • CARDIAC CATHETERIZATION N/A 2/1/2023    Procedure: Cardiac other-IVUS;  Surgeon: Carina Cowart MD;  Location: [facility] CARDIAC CATH [facility]; Service: Cardiology   • CHOLECYSTECTOMY      Percutaneous   • COLONOSCOPY     • CYSTOSCOPY     • OTHER SURGICAL HISTORY      "stimulator to control bowel movements"   • HI ESOPHAGOGASTRODUODENOSCOPY TRANSORAL DIAGNOSTIC N/A 9/27/2016    Procedure: ESOPHAGOGASTRODUODENOSCOPY (EGD); Surgeon: Royer Muniz MD;  Location: [facility] GI LAB; Service: Gastroenterology   • HI LAPAROSCOPY SURG CHOLECYSTECTOMY N/A 2/29/2016    Procedure: LAPAROSCOPIC CHOLECYSTECTOMY ;  Surgeon: Rohini Ramirez DO;  Location: [facility] Main OR;  Service: General   • ROTATOR CUFF REPAIR Right    • TOE AMPUTATION Right 10/28/2016    Procedure: 3RD TOE AMPUTATION ;  Surgeon: Sandrine Edgar DPM;  Location: [facility] Main OR;  Service:        Family History   Problem Relation Age of Onset   • Leukemia Mother    • Liver disease Mother    • Lung cancer Mother         heavy smoker - 3 ppd   • Heart disease Father    • Liver disease Father    • Multiple myeloma Sister    • Breast cancer Sister    • Urolithiasis Family    • Alcohol abuse Neg Hx    • Depression Neg Hx    • Drug abuse Neg Hx    • Substance Abuse Neg Hx    • Mental illness Neg Hx      I have reviewed and agree with the history as documented      E-Cigarette/Vaping   • E-Cigarette Use Never User      E-Cigarette/Vaping Substances   • Nicotine No    • THC No    • CBD No    • Flavoring No    • Other No    • Unknown No      Social History     Tobacco Use   • Smoking status: Never   • Smokeless tobacco: Never   Vaping Use   • Vaping Use: Never used   Substance Use Topics   • Alcohol use: Not Currently   • Drug use: No        Review of Systems   All other systems reviewed and are negative  Physical Exam  ED Triage Vitals   Temperature Pulse Respirations Blood Pressure SpO2   03/08/23 1425 03/08/23 1425 03/08/23 1425 03/08/23 1515 03/08/23 1425   97 6 °F (36 4 °C) 77 20 139/65 98 %      Temp Source Heart Rate Source Patient Position - Orthostatic VS BP Location FiO2 (%)   03/08/23 1425 03/08/23 1425 03/08/23 1425 03/08/23 1425 --   Oral Monitor Lying Right arm       Pain Score       03/08/23 1425       5             Orthostatic Vital Signs  Vitals:    03/08/23 1425 03/08/23 1515   BP:  139/65   Pulse: 77 70   Patient Position - Orthostatic VS: Lying Lying       Physical Exam  Vitals and nursing note reviewed  Constitutional:       General: He is not in acute distress  Appearance: He is well-developed  He is not ill-appearing or diaphoretic  HENT:      Head: Normocephalic and atraumatic  Right Ear: External ear normal       Left Ear: External ear normal       Nose: Nose normal       Mouth/Throat:      Mouth: Mucous membranes are moist    Eyes:      Conjunctiva/sclera: Conjunctivae normal    Cardiovascular:      Rate and Rhythm: Normal rate and regular rhythm  Heart sounds: No murmur heard  Pulmonary:      Effort: Pulmonary effort is normal  No respiratory distress  Breath sounds: Normal breath sounds  No rales  Abdominal:      General: There is no distension  Palpations: Abdomen is soft  Tenderness: There is no abdominal tenderness  Musculoskeletal:         General: No deformity  Cervical back: Normal range of motion  Right lower leg: No tenderness  No edema  Left lower leg: No tenderness  No edema  Skin:     General: Skin is warm and dry  Neurological:      General: No focal deficit present        Mental Status: He is alert and oriented to person, place, and time           ED Medications  Medications   sodium chloride (PF) 0 9 % injection 3 mL (has no administration in time range)       Diagnostic Studies  Results Reviewed     Procedure Component Value Units Date/Time    HS Troponin I 4hr [[id number]]     Lab Status: No result Specimen: Blood     HS Troponin 0hr (reflex protocol) [[id number]]  (Abnormal) Collected: 03/08/23 1455    Lab Status: Final result Specimen: Blood from Arm, Right Updated: 03/08/23 1533     hs TnI 0hr 125 ng/L     HS Troponin I 2hr [[id number]]     Lab Status: No result Specimen: Blood     Basic metabolic panel [[id number]]  (Abnormal) Collected: 03/08/23 1455    Lab Status: Final result Specimen: Blood from Arm, Right Updated: 03/08/23 1515     Sodium 133 mmol/L      Potassium 4 3 mmol/L      Chloride 97 mmol/L      CO2 31 mmol/L      ANION GAP 5 mmol/L      BUN 39 mg/dL      Creatinine 5 21 mg/dL      Glucose 166 mg/dL      Calcium 9 6 mg/dL      eGFR 10 ml/min/1 73sq m     Narrative:      [facility] guidelines for Chronic Kid[facility] Disease (CKD):   •  Stage 1 with normal or high GFR (GFR > 90 mL/min/1 73 square meters)  •  Stage 2 Mild CKD (GFR = 60-89 mL/min/1 73 square meters)  •  Stage 3A Moderate CKD (GFR = 45-59 mL/min/1 73 square meters)  •  Stage 3B Moderate CKD (GFR = 30-44 mL/min/1 73 square meters)  •  Stage 4 Severe CKD (GFR = 15-29 mL/min/1 73 square meters)  •  Stage 5 End Stage CKD (GFR <15 mL/min/1 73 square meters)  Note: GFR calculation is accurate only with a steady state creatinine    CBC and differential [[id number]]  (Abnormal) Collected: 03/08/23 1455    Lab Status: Final result Specimen: Blood from Arm, Right Updated: 03/08/23 1504     WBC 4 58 Thousand/uL      RBC 3 66 Million/uL      Hemoglobin 10 8 g/dL      Hematocrit 34 9 %      MCV 95 fL      MCH 29 5 pg      MCHC 30 9 g/dL      RDW 15 1 %      MPV 10 2 fL      Platelets 866 Thousands/uL      nRBC 0 /100 WBCs      Neutrophils Relative 63 %      Immat GRANS % 1 %      Lymphocytes Relative 23 %      Monocytes Relative 11 %      Eosinophils Relative 2 %      Basophils Relative 0 %      Neutrophils Absolute 2 90 Thousands/µL      Immature Grans Absolute 0 03 Thousand/uL      Lymphocytes Absolute 1 04 Thousands/µL      Monocytes Absolute 0 50 Thousand/µL      Eosinophils Absolute 0 09 Thousand/µL      Basophils Absolute 0 02 Thousands/µL                  X-ray chest 1 view portable    (Results Pending)         Procedures  Procedures      ED Course                                       Medical Decision Making  60 yo man presenting with chest pain  Concerning for ACS  Does not appear fluid overloaded on exam  Will evaluate with labs, ECG, CXR  CXR shows no significant changes, given exam and symptoms, I do not believe this represents significant pulmonary edema  No ECG changes on my read  Troponin elevated, which may be due to ESRD and decreased clearance, however unclear give pt's history  Admitted to medicine for serial troponins, ECG, and further observation  Amount and/or Complexity of Data Reviewed  Labs: ordered  Radiology: ordered  ECG/medicine tests: independent interpretation performed  Details: My read: normal sinus rhythm, no ST changes, unchanged RBBB      Risk  Prescription drug management  Decision regarding hospitalization              Disposition  Final diagnoses:   Chest pain, unspecified   Type 2 diabetes mellitus with chronic kidney disease on chronic dialysis, with long-term current use of insulin (HCC)   CAD, multiple vessel   Type 1 non-ST elevation myocardial infarction (NSTEMI) s/p ADE to in-stent restenosis of mid LAD     Time reflects when diagnosis was documented in both MDM as applicable and the Disposition within this note     Time User Action Codes Description Comment    3/8/2023  3:55 PM [address] [R07 9] Chest pain, unspecified     3/8/2023  3:55 PM Esther Lujan Add [E11 22,  N18 6,  Z99 2, Z79 4] Type 2 diabetes mellitus with chronic kidney disease on chronic dialysis, with long-term current use of insulin ([facility] )     3/8/2023  3:55 PM Carlene Hendrickson Add [I25 10] CAD, multiple vessel     3/8/2023  3:55 PM Carlene Hendrickson Add [I21 4] Type 1 non-ST elevation myocardial infarction (NSTEMI) s/p ADE to in-stent restenosis of mid LAD       ED Disposition     ED Disposition   Admit    Condition   Stable    Date/Time   Wed Mar 8, 2023  3:55 PM    Comment   --         Follow-up Information    None         Patient's Medications   Discharge Prescriptions    No medications on file     No discharge procedures on file  PDMP Review       Value Time User    PDMP Reviewed  Yes 11/12/2022  6:19 AM Karthik Rivera MD           ED Provider  Attending physically available and evaluated Javy Elizabeth CUNHA managed the patient along with the ED Attending      Electronically Signed by         Cristiana Villa MD  03/08/23 0137

## 2023-03-08 NOTE — H&P
[address] [facility]  H&P- Lowella Curling 1960, 61 y o  male MRN: [id number]  Unit/Bed#: -01 Encounter: [id number]  Primary Care Provider: Guille Grigsby DO   Date and time admitted to hospital: 3/8/2023  2:15 PM    * Chest pain  Assessment & Plan  · Patient presented with chest pain during dialysis, lasted about an hour then resolved  No EKG changes from priors  · Cardiac history as listed below  · First troponin 125, second 147, continue to trend  · Given flat troponins and EKG stable from priors, low suspicion for ACS  · Trend EKGs  · Monitor on telemetry  · Cardiology consulted, appreciate recommendations    Mood disorder [facility])  Assessment & Plan  · Continue home Depakote    CAD, multiple vessel  Assessment & Plan  · Patient underwent cardiac cath on 2/1/2023 which showed multivessel CAD with marked progression since the prior cath in May 2022, including stent restenosis and occlusion  · Continue home aspirin, prasugrel, statin, metoprolol    ESRD on dialysis [facility])  Assessment & Plan  Lab Results   Component Value Date    EGFR 10 03/08/2023    EGFR 12 03/06/2023    EGFR 10 02/11/2023    CREATININE 5 21 (H) 03/08/2023    CREATININE 4 72 (H) 03/06/2023    CREATININE 5 49 (H) 02/11/2023       · Continue dialysis while inpatient, nephrology consulted    Type 2 diabetes mellitus [facility])  Assessment & Plan  Lab Results   Component Value Date    HGBA1C 5 5 12/23/2022       Recent Labs     03/08/23 2048   POCGLU 118       Blood Sugar Average: Last 72 hrs:  (P) 118     · Per patient and his wife, has not been using insulin at home (or any other diabetes medications) for over a month  · Insulin sliding scale while inpatient    VTE Pharmacologic Prophylaxis:   High Risk (Score >/= 5) - Pharmacological DVT Prophylaxis Ordered: heparin  Sequential Compression Devices Ordered    Code Status: Level 1 - Full Code confirmed  Discussion with family: Updated  (wife) at bedside  Anticipated Length of Stay: Patient will be admitted on an observation basis with an anticipated length of stay of less than 2 midnights secondary to As above  Total Time Spent on Date of Encounter in care of patient: 65 minutes This time was spent on one or more of the following: performing physical exam; counseling and coordination of care; obtaining or reviewing history; documenting in the medical record; reviewing/ordering tests, medications or procedures; communicating with other healthcare professionals and discussing with patient's family/caregivers  Chief Complaint: Chest pain during dialysis    History of Present Illness:  Brenda Angel is a 61 y o  male with a PMH of ESRD on HD (MWF), CHF on a [place], mvCAD w recent NSTEMI s/p ADE, diabetes, hypertension, hyperlipidemia, who presented with chest pain occurring during dialysis  Patient underwent cardiac cath on 2/1/2023 which showed multivessel CAD with marked progression since the prior cath in May 2022, including stent restenosis and occlusion  He had 1 additional stent placed during this cath, and was discharged on DAPT  He was at dialysis on day of presentation when he developed substernal chest pain that felt similar to the chest pain that he presented to the emergency department with his NSTEMI a month ago  Not related with exertion per se, however did occur while on dialysis  Patient does not think his blood pressure was low during dialysis but he is not positive  He did not have any other symptoms such as diaphoresis, nausea, neck or jaw pain, dyspnea, or lightheadedness  Review of Systems:  Review of Systems   All other systems reviewed and are negative        Past Medical and Surgical History:   Past Medical History:   Diagnosis Date   • Cerebrovascular [facility] (CVA) due [facility] 7/29/2018   • Chronic kidney disease    • Diabetes mellitus ([facility] [address] )    • GERD (gastroesophageal reflux disease)    • Hypercholesteremia    • Hyperlipidemia    • Hypertension    • 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   • Neuropathy    • Obesity    • Osteomyelitis ([address] )     last assessed 11/4/16   • PVC's (premature ventricular contractions)     sees cardiology Dr Nicole camargo   • Stroke [facility])     last weeof July 2018 [address] [facility]   • TIA (transient ischemic attack) 10/28/2018       Past Surgical History:   Procedure Laterality Date   • ABDOMINAL SURGERY     • CARDIAC CATHETERIZATION N/A 5/2/2022    Procedure: Cardiac Coronary Angiogram;  Surgeon: Simon Franco MD;  Location: [facility] CARDIAC CATH LAB; Service: Cardiology   • CARDIAC CATHETERIZATION N/A 5/2/2022    Procedure: Cardiac pci;  Surgeon: Simon Franco MD;  Location: [facility] CARDIAC CATH LAB; Service: Cardiology   • CARDIAC CATHETERIZATION  2/1/2023    Procedure: Cardiac catheterization;  Surgeon: Simon Franco MD;  Location: [facility] CARDIAC CATH LAB; Service: Cardiology   • CARDIAC CATHETERIZATION N/A 2/1/2023    Procedure: Cardiac pci;  Surgeon: Simon Franco MD;  Location: [facility] CARDIAC CATH LAB; Service: Cardiology   • CARDIAC CATHETERIZATION N/A 2/1/2023    Procedure: Cardiac Coronary Angiogram;  Surgeon: Simon Franco MD;  Location: [facility] CARDIAC CATH LAB; Service: Cardiology   • CARDIAC CATHETERIZATION N/A 2/1/2023    Procedure: Cardiac other-IVUS;  Surgeon: Simon Franco MD;  Location: [facility] CARDIAC CATH LAB; Service: Cardiology   • CHOLECYSTECTOMY      Percutaneous   • COLONOSCOPY     • CYSTOSCOPY     • OTHER SURGICAL HISTORY      "stimulator to control bowel movements"   • NV ESOPHAGOGASTRODUODENOSCOPY TRANSORAL DIAGNOSTIC N/A 9/27/2016    Procedure: ESOPHAGOGASTRODUODENOSCOPY (EGD); Surgeon: Carlos Vogel MD;  Location: AN GI LAB;   Service: Gastroenterology   • NV LAPAROSCOPY SURG CHOLECYSTECTOMY N/A 2/29/2016    Procedure: LAPAROSCOPIC CHOLECYSTECTOMY ;  Surgeon: Missy Mai DO;  Location: AN Main OR;  Service: General • ROTATOR CUFF REPAIR Right    • TOE AMPUTATION Right 10/28/2016    Procedure: 3RD TOE AMPUTATION ;  Surgeon: Duncan Fairchild DPM;  Location: AN Main OR;  Service:        Meds/Allergies:  Prior to Admission medications    Medication Sig Start Date End Date Taking? Authorizing Provider   aspirin (ECOTRIN LOW STRENGTH) 81 mg EC tablet Take 81 mg by mouth daily Resume on 8/14      Historical Provider, MD   atorvastatin (LIPITOR) 40 mg tablet TAKE 1 TABLET BY MOUTH EVERY DAY WITH DINNER 12/16/22   Raj Iqbal DO   Blood Glucose Monitoring Suppl (True Metrix Meter) w/Device KIT Use to test blood sugars 3 times daily 7/1/22   Kendy Smith MD   Blood Pressure Monitoring (BLOOD PRESSURE CUFF) MISC Use to check blood pressure before taking blood pressure medication and 1 hour after and follow instructions provided in discharge instructions based on the readings   8/13/18   Lauren Gonzalez MD   calcium acetate (CALPHRON) 667 mg 3 tablets 3x per day 10/17/21   Historical Provider, MD   Cholecalciferol (Vitamin D3) 1 25 MG (18460 UT) CAPS TAKE 1 CAPSULE BY MOUTH ONE TIME PER WEEK 6/3/22   Camille Thomason MD   divalproex sodium (DEPAKOTE SPRINKLE) 125 MG capsule TAKE 2 CAPSULES (250 MG TOTAL) BY MOUTH EVERY 12 (TWELVE) HOURS 2/16/23   Raj Auguste,    Insulin Pen Needle (BD Pen Needle Adina U/F) 32G X 4 MM MISC Use 4 times daily 12/14/22   Kendy Smith MD   Insulin Syringe-Needle U-100 (B-D INS SYRINGE 0 5CC/30GX1/2") 30G X 1/2" 0 5 ML MISC Inject once daily 12/14/22   Kendy Smith MD   Lancets Ultra Thin 30G MISC Use 3 times a day 3/28/22   Lissette Brennan PA-C   metoprolol succinate (TOPROL-XL) 25 mg 24 hr tablet Take 1 tablet (25 mg total) by mouth daily 2/14/23   BRITTANY Stacy   [facility] LANCETS 51D MISC by Does not apply route 3 (three) times a day 11/27/18   Vanda Lundborg, DO   prasugrel (EFFIENT) tablet Take 1 tablet (5 mg total) by mouth daily 2/14/23   BRITTANY Stacy Blood Glucose Test test strip USE 1 EACH 3 (THREE) TIMES A DAY USE AS INSTRUCTED 2/7/23   Bianka Londono MD     I have reviewed home medications with patient family member  Allergies: No Known Allergies    Social History:  Marital Status: /Civil Union   Occupation: None  Patient Pre-hospital Living Situation: Home  Patient Pre-hospital Level of Mobility: walks  Patient Pre-hospital Diet Restrictions: None  Substance Use History:   Social History     Substance and Sexual Activity   Alcohol Use Not Currently     Social History     Tobacco Use   Smoking Status Never   Smokeless Tobacco Never     Social History     Substance and Sexual Activity   Drug Use No       Family History:  Family History   Problem Relation Age of Onset   • Leukemia Mother    • Liver disease Mother    • Lung cancer Mother         heavy smoker - 3 ppd   • Heart disease Father    • Liver disease Father    • Multiple myeloma Sister    • Breast cancer Sister    • Urolithiasis Family    • Alcohol abuse Neg Hx    • Depression Neg Hx    • Drug abuse Neg Hx    • Substance Abuse Neg Hx    • Mental illness Neg Hx        Physical Exam:     Vitals:   Blood Pressure: 139/65 (03/08/23 1515)  Pulse: 70 (03/08/23 1515)  Temperature: 97 6 °F (36 4 °C) (03/08/23 1425)  Temp Source: Oral (03/08/23 1425)  Respirations: 21 (03/08/23 1515)  SpO2: 96 % (03/08/23 1515)    Physical Exam  Vitals reviewed  Constitutional:       General: He is not in acute distress  Appearance: He is not ill-appearing or toxic-appearing  HENT:      Mouth/Throat:      Mouth: Mucous membranes are moist    Eyes:      General: No scleral icterus  Cardiovascular:      Rate and Rhythm: Normal rate and regular rhythm  Comments: No chest wall tenderness to palpation  Pulmonary:      Effort: Pulmonary effort is normal  No respiratory distress  Breath sounds: Normal breath sounds  Abdominal:      General: Bowel sounds are normal       Palpations: Abdomen is soft  Tenderness: There is no abdominal tenderness  Musculoskeletal:      Right lower leg: No edema  Left lower leg: No edema  Skin:     General: Skin is warm and dry  Neurological:      Mental Status: He is alert and oriented to person, place, and time  Psychiatric:         Behavior: Behavior normal       Comments: Very tearful          Additional Data:     Lab Results:  Results from last 7 days   Lab Units 03/08/23  1455   WBC Thousand/uL 4 58   HEMOGLOBIN g/dL 10 8*   HEMATOCRIT % 34 9*   PLATELETS Thousands/uL 138*   NEUTROS PCT % 63   LYMPHS PCT % 23   MONOS PCT % 11   EOS PCT % 2     Results from last 7 days   Lab Units 03/08/23  1455 03/06/23  2028   SODIUM mmol/L 133* 137   POTASSIUM mmol/L 4 3 4 2   CHLORIDE mmol/L 97 94*   CO2 mmol/L 31 32   BUN mg/dL 39* 28*   CREATININE mg/dL 5 21* 4 72*   ANION GAP mmol/L 5 11   CALCIUM mg/dL 9 6 9 7   ALBUMIN g/dL  --  4 0   TOTAL BILIRUBIN mg/dL  --  0 78   ALK PHOS U/L  --  57   ALT U/L  --  6*   AST U/L  --  8*   GLUCOSE RANDOM mg/dL 166* 113         Results from last 7 days   Lab Units 03/08/23  2048   POC GLUCOSE mg/dl 118               Lines/Drains:  Invasive Devices     Peripheral Intravenous Line  Duration           Peripheral IV 03/08/23 Dorsal (posterior); Right Forearm <1 day    Peripheral IV 03/08/23 Dorsal (posterior); Right Wrist <1 day          Line  Duration           Hemodialysis AV Fistula Left Upper arm -- days                    Imaging: No pertinent imaging reviewed  X-ray chest 1 view portable    (Results Pending)       EKG and Other Studies Reviewed on Admission:   · EKG: NSR  HR 70s  ** Please Note: This note has been constructed using a voice recognition system   **

## 2023-03-08 NOTE — ASSESSMENT & PLAN NOTE
· Patient underwent cardiac cath on 2/1/2023 which showed multivessel CAD with marked progression since the prior cath in May 2022, including stent restenosis and occlusion    · Continue home aspirin, prasugrel, statin, metoprolol

## 2023-03-09 ENCOUNTER — APPOINTMENT (OUTPATIENT)
Dept: CARDIAC REHAB | Facility: CLINIC | Age: 63
End: 2023-03-09

## 2023-03-09 LAB
ALBUMIN SERPL BCP-MCNC: 3.4 G/DL (ref 3.5–5)
ALP SERPL-CCNC: 62 U/L (ref 46–116)
ALT SERPL W P-5'-P-CCNC: 12 U/L (ref 12–78)
ANION GAP SERPL CALCULATED.3IONS-SCNC: 7 MMOL/L (ref 4–13)
AST SERPL W P-5'-P-CCNC: 10 U/L (ref 5–45)
ATRIAL RATE: 74 BPM
BILIRUB SERPL-MCNC: 0.47 MG/DL (ref 0.2–1)
BUN SERPL-MCNC: 48 MG/DL (ref 5–25)
CALCIUM ALBUM COR SERPL-MCNC: 9.9 MG/DL (ref 8.3–10.1)
CALCIUM SERPL-MCNC: 9.4 MG/DL (ref 8.3–10.1)
CHLORIDE SERPL-SCNC: 99 MMOL/L (ref 96–108)
CO2 SERPL-SCNC: 27 MMOL/L (ref 21–32)
CREAT SERPL-MCNC: 6.86 MG/DL (ref 0.6–1.3)
ERYTHROCYTE [DISTWIDTH] IN BLOOD BY AUTOMATED COUNT: 14.8 % (ref 11.6–15.1)
GFR SERPL CREATININE-BSD FRML MDRD: 7 ML/MIN/1.73SQ M
GLUCOSE SERPL-MCNC: 115 MG/DL (ref 65–140)
GLUCOSE SERPL-MCNC: 121 MG/DL (ref 65–140)
GLUCOSE SERPL-MCNC: 184 MG/DL (ref 65–140)
GLUCOSE SERPL-MCNC: 86 MG/DL (ref 65–140)
GLUCOSE SERPL-MCNC: 86 MG/DL (ref 65–140)
HCT VFR BLD AUTO: 32.8 % (ref 36.5–49.3)
HGB BLD-MCNC: 10.2 G/DL (ref 12–17)
MAGNESIUM SERPL-MCNC: 2.6 MG/DL (ref 1.6–2.6)
MCH RBC QN AUTO: 29.7 PG (ref 26.8–34.3)
MCHC RBC AUTO-ENTMCNC: 31.1 G/DL (ref 31.4–37.4)
MCV RBC AUTO: 96 FL (ref 82–98)
P AXIS: 45 DEGREES
P AXIS: 53 DEGREES
P AXIS: 67 DEGREES
PLATELET # BLD AUTO: 142 THOUSANDS/UL (ref 149–390)
PMV BLD AUTO: 10 FL (ref 8.9–12.7)
POTASSIUM SERPL-SCNC: 3.9 MMOL/L (ref 3.5–5.3)
PR INTERVAL: 158 MS
PR INTERVAL: 160 MS
PR INTERVAL: 182 MS
PROT SERPL-MCNC: 7.3 G/DL (ref 6.4–8.4)
QRS AXIS: -12 DEGREES
QRS AXIS: 2 DEGREES
QRS AXIS: 5 DEGREES
QRSD INTERVAL: 154 MS
QRSD INTERVAL: 156 MS
QRSD INTERVAL: 156 MS
QT INTERVAL: 450 MS
QT INTERVAL: 450 MS
QT INTERVAL: 468 MS
QTC INTERVAL: 499 MS
QTC INTERVAL: 499 MS
QTC INTERVAL: 519 MS
RBC # BLD AUTO: 3.43 MILLION/UL (ref 3.88–5.62)
SODIUM SERPL-SCNC: 133 MMOL/L (ref 135–147)
T WAVE AXIS: 72 DEGREES
T WAVE AXIS: 82 DEGREES
T WAVE AXIS: 88 DEGREES
VENTRICULAR RATE: 74 BPM
WBC # BLD AUTO: 5.3 THOUSAND/UL (ref 4.31–10.16)

## 2023-03-09 RX ORDER — LANOLIN ALCOHOL/MO/W.PET/CERES
9 CREAM (GRAM) TOPICAL
Status: DISCONTINUED | OUTPATIENT
Start: 2023-03-09 | End: 2023-03-10 | Stop reason: HOSPADM

## 2023-03-09 RX ADMIN — METOPROLOL SUCCINATE 25 MG: 25 TABLET, EXTENDED RELEASE ORAL at 09:23

## 2023-03-09 RX ADMIN — ASPIRIN 81 MG: 81 TABLET, COATED ORAL at 09:23

## 2023-03-09 RX ADMIN — CALCIUM ACETATE 667 MG: 667 CAPSULE ORAL at 09:23

## 2023-03-09 RX ADMIN — HEPARIN SODIUM 5000 UNITS: 5000 INJECTION INTRAVENOUS; SUBCUTANEOUS at 21:51

## 2023-03-09 RX ADMIN — CALCIUM ACETATE 667 MG: 667 CAPSULE ORAL at 12:09

## 2023-03-09 RX ADMIN — CALCIUM ACETATE 667 MG: 667 CAPSULE ORAL at 17:39

## 2023-03-09 RX ADMIN — INSULIN LISPRO 1 UNITS: 100 INJECTION, SOLUTION INTRAVENOUS; SUBCUTANEOUS at 12:11

## 2023-03-09 RX ADMIN — PRASUGREL 5 MG: 10 TABLET, FILM COATED ORAL at 09:26

## 2023-03-09 RX ADMIN — ATORVASTATIN CALCIUM 40 MG: 40 TABLET, FILM COATED ORAL at 17:39

## 2023-03-09 RX ADMIN — HEPARIN SODIUM 5000 UNITS: 5000 INJECTION INTRAVENOUS; SUBCUTANEOUS at 14:40

## 2023-03-09 RX ADMIN — DIVALPROEX SODIUM 250 MG: 125 CAPSULE ORAL at 21:52

## 2023-03-09 RX ADMIN — HEPARIN SODIUM 5000 UNITS: 5000 INJECTION INTRAVENOUS; SUBCUTANEOUS at 05:06

## 2023-03-09 RX ADMIN — DIVALPROEX SODIUM 250 MG: 125 CAPSULE ORAL at 09:22

## 2023-03-09 RX ADMIN — MELATONIN 9 MG: at 21:52

## 2023-03-09 NOTE — PHYSICAL THERAPY NOTE
Physical Therapy Evaluation     Patient's Name: Howard Ellsworth    Admitting Diagnosis  Chest pain, unspecified [R07 9]  Chest pain [R07 9]  CAD, multiple vessel [I25 10]  Type 2 diabetes mellitus with chronic kidney disease on chronic dialysis, with long-term current use of insulin ([facility]) [E11 22, N18 6, Z99 2, Z79 4]  Type 1 non-ST elevation myocardial infarction (NSTEMI) ([facility] ) [I21 4]    Problem List  Patient Active Problem List   Diagnosis    Type 2 diabetes mellitus with chronic kidney disease on chronic dialysis, with long-term current use of insulin ([facility])    Dizziness    Mixed hyperlipidemia    Nephrotic range proteinuria    Elevated alkaline phosphatase level    Diabetic macular edema (HCC)    GERD (gastroesophageal reflux disease)    Type 2 diabetes mellitus ([facility])    Other constipation    Abnormal EEG    History of stroke    symptomatic hypoglycemia    Anxiety associated with depression    Urge incontinence    Overactive bladder    S/P arteriovenous (AV) fistula creation    Pre-kidney transplant, listed    Anemia    History of 2019 novel coronavirus disease (COVID-19)    [facility] )    Penetrating foot wound, left, initial encounter    Emotional lability    Primary hypertension    Generalized weakness    Chest pain    Electrolyte abnormality    ESRD (end stage renal disease) (HCC)    CAD, multiple vessel    SOB (shortness of breath)    Left arm swelling    Pre-syncope    Type 1 non-ST elevation myocardial infarction (NSTEMI) s/p ADE to in-stent restenosis of mid LAD    Ischemic cardiomyopathy    Moderate AS (aortic stenosis)    Mood disorder ([facility] )    Diabetic polyneuropathy associated with type 2 diabetes mellitus (HCC)    Absence of toe of right foot (HCC)    Hammer toes of both feet       Past Medical History  Past Medical History:   Diagnosis Date    Cerebrovascular accident (CVA) due to thrombosis of left middle cerebral artery ([facility] ) 7/29/2018    Chronic kidney disease     Diabetes mellitus ([facility] )     GERD (gastroesophageal reflux disease)     Hypercholesteremia     Hyperlipidemia     Hypertension     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    Neuropathy     Obesity     [facility] )     last assessed 11/4/16    PVC's (premature ventricular contractions)     sees cardiology Dr Lola camargo    Stroke [facility])     last weeof July 2018 [address] [facility]    TIA (transient ischemic attack) 10/28/2018       Past Surgical History  Past Surgical History:   Procedure Laterality Date    ABDOMINAL SURGERY      CARDIAC CATHETERIZATION N/A 5/2/2022    Procedure: Cardiac Coronary Angiogram;  Surgeon: Greer Waller MD;  Location: [facility] CARDIAC CATH LAB; Service: Cardiology    CARDIAC CATHETERIZATION N/A 5/2/2022    Procedure: Cardiac pci;  Surgeon: Greer Waller MD;  Location: [facility] CARDIAC CATH LAB; Service: Cardiology    CARDIAC CATHETERIZATION  2/1/2023    Procedure: Cardiac catheterization;  Surgeon: Greer Waller MD;  Location: [facility] CARDIAC CATH LAB; Service: Cardiology    CARDIAC CATHETERIZATION N/A 2/1/2023    Procedure: Cardiac pci;  Surgeon: Greer Waller MD;  Location: [facility] CARDIAC CATH LAB; Service: Cardiology    CARDIAC CATHETERIZATION N/A 2/1/2023    Procedure: Cardiac Coronary Angiogram;  Surgeon: Greer Waller MD;  Location: [facility] CARDIAC CATH LAB; Service: Cardiology    CARDIAC CATHETERIZATION N/A 2/1/2023    Procedure: Cardiac other-IVUS;  Surgeon: Greer Waller MD;  Location: [facility] CARDIAC CATH LAB; Service: Cardiology    CHOLECYSTECTOMY      Percutaneous    COLONOSCOPY      CYSTOSCOPY      OTHER SURGICAL HISTORY      "stimulator to control bowel movements"    SC ESOPHAGOGASTRODUODENOSCOPY TRANSORAL DIAGNOSTIC N/A 9/27/2016    Procedure: ESOPHAGOGASTRODUODENOSCOPY (EGD); Surgeon: Kenna Rand MD;  Location: AN GI LAB;   Service: Gastroenterology    SC LAPAROSCOPY SURG CHOLECYSTECTOMY N/A 2/29/2016    Procedure: LAPAROSCOPIC CHOLECYSTECTOMY ;  Surgeon: Alycia Gonzales DO;  Location: [facility] Main OR; Service: General    ROTATOR CUFF REPAIR Right     TOE AMPUTATION Right 10/28/2016    Procedure: 3RD TOE AMPUTATION ;  Surgeon: Aguilar Chung DPM;  Location: [facility] Main OR;  Service:           03/09/23 0829   PT Last Visit   PT Visit Date 03/09/23   Note Type   Note type Evaluation   Pain Assessment   Pain Assessment Tool 0-10   Pain Score No Pain   Restrictions/Precautions   Weight Bearing Precautions Per Order No   Other Precautions Fall Risk;Telemetry   Home Living   Type of [address] Two level  (3STE, 4 steps inside home)   Bathroom Shower/Tub Tub/shower unit   Jm Denisse  (reports use PTA)   Prior Function   Level of [place] Independent with ADLs; Independent with functional mobility; Independent with IADLS   Lives With Spouse   Falls in the last 6 months 0   Vocational Retired   Cognition   Overall Cognitive Status WFL   Attention Within functional limits   Memory Within functional limits   Following Commands Follows one step commands without difficulty   Subjective   Subjective Pleasant and agreeable to participate in therapy session   RLE Assessment   RLE Assessment   (functionally 3+/5)   LLE Assessment   LLE Assessment   (functionally 3+/5)   Bed Mobility   Additional Comments Found and left OOB in chair with all needs in reach     Transfers   Sit to Stand 5  Supervision   Additional items Increased time required;Verbal cues   Stand to Sit 5  Supervision   Additional items Increased time required;Verbal cues   Additional Comments with RW   Ambulation/Elevation   Gait pattern Excessively slow  (mild unsteadiness)   Gait Assistance 5  Supervision   Assistive Device Rolling walker   Distance 100 ft x 1   Stair Management Assistance   (declines trial prior to DC)   Balance   Static Sitting Fair +   Dynamic Sitting Fair   Static Standing Fair -   Dynamic Standing [address],Flo[address] 3,4, & 5 -   Activity Tolerance   Activity Tolerance Patient tolerated treatment well   Medical Staff Made Aware BABAK Ayala   Nurse Made Aware RN cleared pt to be seen by PT   Assessment   Prognosis Good   Assessment Pt seen for high complexity PT evaluation due to decrease in functional mobility status compared to baseline  Pt with active PT eval/treat orders at this time  Pt is a 61 y o  M who presented [facility] with chest pain on 3/8/23  Pt  has a past medical history of Cerebrovascular accident (CVA) due to thrombosis of left middle cerebral artery ([facility] [address] ) (7/29/2018), Chronic kidney disease, Diabetes mellitus ([facility] [address] ), GERD (gastroesophageal reflux disease), Hypercholesteremia, Hyperlipidemia, Hypertension, Infectious viral hepatitis, Neuropathy, Obesity, Osteomyelitis ([facility] [address] ), PVC's (premature ventricular contractions), [facility] ), and TIA (transient ischemic attack) (10/28/2018)  [facility]  Pt requires supervision for all mobility at this time  Pt left upright in bedside chair with all needs in reach  Pt denies any concerns regarding mobility upon DC  PT to DC pt as pt has no further acute skilled PT needs and recommending OPPT  The patient's AM-PAC Basic Mobility Inpatient Short Form Raw Score is 19  A Raw score of greater than 16 suggests the patient may benefit from discharge to home  Please also refer to the recommendation of the Physical Therapist for safe discharge planning     Barriers to Discharge None   Goals   Patient Goals to go home   Recommendation   PT Discharge Recommendation Home with outpatient rehabilitation   Equipment Recommended Walker  (pt owns)   [address] in Flat Bed Without Bedrails 4   Lying on Back to Sitting on Edge of Flat Bed Without Bedrails 3   Moving Bed to Chair 3   Standing Up From Chair Using Arms 3   Walk in Room 3   Climb 3-5 Stairs With Railing 3   Basic Mobility Inpatient Raw Score 19   Basic Mobility Standardized Score 42 48   Highest Level Of Mobility JH-HLM Goal 6: Walk 10 steps or more   JH-HLM Achieved 7: Walk 25 feet or more   Modified [place] Scale   Modified [place] Scale 3           Jermaine Cespedes, PT, DPT

## 2023-03-09 NOTE — ASSESSMENT & PLAN NOTE
Lab Results   Component Value Date    EGFR 10 03/08/2023    EGFR 12 03/06/2023    EGFR 10 02/11/2023    CREATININE 5 21 (H) 03/08/2023    CREATININE 4 72 (H) 03/06/2023    CREATININE 5 49 (H) 02/11/2023       · Continue dialysis while inpatient, nephrology consulted

## 2023-03-09 NOTE — ASSESSMENT & PLAN NOTE
· Noted on ECHO from 1/30 with EF 25-30%, moderate AS  · Previously discharged with [facility]est during prior hospitalization   · Appreciate cardiology recommendations

## 2023-03-09 NOTE — PLAN OF CARE
Problem: Prexisting or High Potential for Compromised Skin Integrity  Goal: Skin integrity is maintained or improved  Description: INTERVENTIONS:  - Identify patients at risk for skin breakdown  - Assess and monitor skin integrity  - Assess and monitor nutrition and hydration status  - Monitor labs   - Assess for incontinence   - Turn and reposition patient  - Assist with mobility/ambulation  - Relieve pressure over bony prominences  - Avoid friction and shearing  - Provide appropriate hygiene as needed including keeping skin clean and dry  - Evaluate need for skin moisturizer/barrier cream  - Collaborate with interdisciplinary team   - Patient/family teaching  - Consider wound care consult   Outcome: Progressing     Problem: PAIN - ADULT  Goal: Verbalizes/displays adequate comfort level or baseline comfort level  Description: Interventions:  - Encourage patient to monitor pain and request assistance  - Assess pain using appropriate pain scale  - Administer analgesics based on type and severity of pain and evaluate response  - Implement non-pharmacological measures as appropriate and evaluate response  - Consider cultural and social influences on pain and pain management  - Notify physician/advanced practitioner if interventions unsuccessful or patient reports new pain  Outcome: Progressing     Problem: INFECTION - ADULT  Goal: Absence or prevention of progression during hospitalization  Description: INTERVENTIONS:  - Assess and monitor for signs and symptoms of infection  - Monitor lab/diagnostic results  - Monitor all insertion sites, i e  indwelling lines, tubes, and drains  - Monitor endotracheal if appropriate and nasal secretions for changes in amount and color  - [place] appropriate cooling/warming therapies per order  - Administer medications as ordered  - Instruct and encourage patient and family to use good hand hygiene technique  - Identify and instruct in appropriate isolation precautions for identified infection/condition  Outcome: Progressing  Goal: Absence of fever/infection during neutropenic period  Description: INTERVENTIONS:  - Monitor WBC    Outcome: Progressing     Problem: SAFETY ADULT  Goal: Patient will remain free of falls  Description: INTERVENTIONS:  - Educate patient/family on patient safety including physical limitations  - Instruct patient to call for assistance with activity   - Consult OT/PT to assist with strengthening/mobility   - Keep Call bell within reach  - Keep bed low and locked with side rails adjusted as appropriate  - Keep care items and personal belongings within reach  - Initiate and maintain comfort rounds  - Make Fall Risk Sign visible to staff  - Offer Toileting every  Hours, in advance of need  - Initiate/Maintain alarm  - Obtain necessary fall risk management equipment:   - Apply yellow socks and bracelet for high fall risk patients  - Consider moving patient to room near nurses station  Outcome: Progressing  Goal: Maintain or return to baseline ADL function  Description: INTERVENTIONS:  -  Assess patient's ability to carry out ADLs; assess patient's baseline for ADL function and identify physical deficits which impact ability to perform ADLs (bathing, care of mouth/teeth, toileting, grooming, dressing, etc )  - Assess/evaluate cause of self-care deficits   - Assess range of motion  - Assess patient's mobility; develop plan if impaired  - Assess patient's need for assistive devices and provide as appropriate  - Encourage maximum independence but intervene and supervise when necessary  - Involve family in performance of ADLs  - Assess for home care needs following discharge   - Consider OT consult to assist with ADL evaluation and planning for discharge  - Provide patient education as appropriate  Outcome: Progressing  Goal: Maintains/Returns to pre admission functional level  Description: INTERVENTIONS:  - Perform BMAT or MOVE assessment daily    - Set and communicate daily mobility goal to care team and patient/family/caregiver  - Collaborate with rehabilitation services on mobility goals if consulted  - Perform Range of Motion  times a day  - Reposition patient every  hours  - Dangle patient  times a day  - Stand patient  times a day  - Ambulate patient  times a day  - Out of bed to chair  times a day   - Out of bed for meals  times a day  - Out of bed for toileting  - Record patient progress and toleration of activity level   Outcome: Progressing     Problem: DISCHARGE PLANNING  Goal: Discharge to home or other facility with appropriate resources  Description: INTERVENTIONS:  - Identify barriers to discharge w/patient and caregiver  - Arrange for needed discharge resources and transportation as appropriate  - Identify discharge learning needs (meds, wound care, etc )  - Arrange for interpretive services to assist at discharge as needed  - Refer to Case Management Department for coordinating discharge planning if the patient needs post-hospital services based on physician/advanced practitioner order or complex needs related to functional status, cognitive ability, or social support system  Outcome: Progressing     Problem: Knowledge Deficit  Goal: Patient/family/caregiver demonstrates understanding of disease process, treatment plan, medications, and discharge instructions  Description: Complete learning assessment and assess knowledge base    Interventions:  - Provide teaching at level of understanding  - Provide teaching via preferred learning methods  Outcome: Progressing

## 2023-03-09 NOTE — ASSESSMENT & PLAN NOTE
Lab Results   Component Value Date    HGBA1C 5 5 12/23/2022       Recent Labs     03/08/23 2048 03/09/23  0612 03/09/23  1035   POCGLU 118 86 184*       Blood Sugar Average: Last 72 hrs:  (P) 329 [id number]   · Well controlled hgbA1c  · Per patient and family, pt not maintained on insulin or PO medications at home, dietary controlled only   · Continue SSI coverage while inpatient   · QID glucose checks  · Hypoglycemia protocol

## 2023-03-09 NOTE — CONSULTS
Consultation - Nephrology   Aida Baugh 61 y o  male MRN: [id number]  Unit/Bed#: -01 Encounter: [id number]    ASSESSMENT/PLAN:   1  ESRD: HD MWF at [facility]   · EDW 97 5kg   · HD tomorrow   2  Chest pain: hx CAD s/p recent cath with stent placement 2/1/23   · Cardiology consulted   3  Anemia of CKD: hgb 10 2   · Outpt Mircera 30mcg q2 weeks   4  Hypertension: BP acceptable   · Continue toprol XL 25mg po daily   5  Hyponatremia: sodium 133 due to ESRD   · Monitor with UF on HD and oral fluid restriction   6  Mineral Bone Disease of CKD:   · Continue phoslo with meals   7  Access: LUE AVF     Summary of Plan:   · Next HD tomorrow   · Ok to d/c from renal standpoint when cleared by primary team     HISTORY OF PRESENT ILLNESS:  Requesting Physician: Barney Vega MD  Reason for Consult: ESRD on HD     Aida Baugh is a 61y o  year old male [facility]  He has ESRD on HD so nephrology was consulted to continue his dialysis  Has a significant history of CAD and underwent recent cath on 2/1/2023 which showed multivessel CAD  He had a stent placed and was discharged  He was at dialysis yesterday and developed 3 out of 10 chest pain so he came to the ER  According to patient and his son dialysis was not taking more fluids than usual and they think his blood pressure was fine  Chest pain has now resolved nausea, vomiting or diarrhea  He is feeling well today        PAST MEDICAL HISTORY:  Past Medical History:   Diagnosis Date   • Cerebrovascular accident (CVA) due to thrombosis of left middle cerebral artery ([facility] [address] ) 7/29/2018   • Chronic kidney disease    • Diabetes mellitus ([facility] [address] )    • GERD (gastroesophageal reflux disease)    • Hypercholesteremia    • Hyperlipidemia    • Hypertension    • 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   • Neuropathy    • Obesity    • Osteomyelitis ([facility] [address] )     last assessed 11/4/16   • PVC's (premature ventricular contractions)     sees cardiology Dr Lola camargo   • Stroke [facility])     last weeof July 2018 [address] [facility]   • TIA (transient ischemic attack) 10/28/2018       PAST SURGICAL HISTORY:  Past Surgical History:   Procedure Laterality Date   • ABDOMINAL SURGERY     • CARDIAC CATHETERIZATION N/A 5/2/2022    Procedure: Cardiac Coronary Angiogram;  Surgeon: Greer Waller MD;  [facility]: [facility] CARDIAC CATH LAB; Service: Cardiology   • CARDIAC CATHETERIZATION N/A 5/2/2022    Procedure: Cardiac pci;  Surgeon: Greer Waller MD;  [facility]: [facility] CARDIAC CATH LAB; Service: Cardiology   • CARDIAC CATHETERIZATION  2/1/2023    Procedure: Cardiac catheterization;  Surgeon: Greer Waller MD;  Location: [facility] CARDIAC CATH LAB; Service: Cardiology   • CARDIAC CATHETERIZATION N/A 2/1/2023    Procedure: Cardiac pci;  Surgeon: Greer Waller MD;  Location: [facility] CARDIAC CATH LAB; Service: Cardiology   • CARDIAC CATHETERIZATION N/A 2/1/2023    Procedure: Cardiac Coronary Angiogram;  Surgeon: Greer Waller MD;  Location: [facility] CARDIAC CATH [facility]; Service: Cardiology   • CARDIAC CATHETERIZATION N/A 2/1/2023    Procedure: Cardiac other-IVUS;  Surgeon: Greer Waller MD;  Location: [facility] CARDIAC CATH LAB; Service: Cardiology   • CHOLECYSTECTOMY      Percutaneous   • COLONOSCOPY     • CYSTOSCOPY     • OTHER SURGICAL HISTORY      "stimulator to control bowel movements"   • MO ESOPHAGOGASTRODUODENOSCOPY TRANSORAL DIAGNOSTIC N/A 9/27/2016    Procedure: ESOPHAGOGASTRODUODENOSCOPY (EGD); Surgeon: Kenna Rand MD;  Location: [facility] GI LAB;   Service: Gastroenterology   • MO LAPAROSCOPY SURG CHOLECYSTECTOMY N/A 2/29/2016    Procedure: LAPAROSCOPIC CHOLECYSTECTOMY ;  Surgeon: Alycia Gonzales DO;  Location: [facility] Main OR;  Service: General   • ROTATOR CUFF REPAIR Right    • TOE AMPUTATION Right 10/28/2016    Procedure: 3RD TOE AMPUTATION ;  Surgeon: Robb Mullins DPM;  Location: [facility] Main OR;  Service:        ALLERGIES:  No Known Allergies    SOCIAL HISTORY:  Social History Substance and Sexual Activity   Alcohol Use Not Currently     Social History     Substance and Sexual Activity   Drug Use No     Social History     Tobacco Use   Smoking Status Never   Smokeless Tobacco Never       FAMILY HISTORY:  Family History   Problem Relation Age of Onset   • Leukemia Mother    • Liver disease Mother    • Lung cancer Mother         heavy smoker - 3 ppd   • Heart disease Father    • Liver disease Father    • Multiple myeloma Sister    • Breast cancer Sister    • Urolithiasis Family    • Alcohol abuse Neg Hx    • Depression Neg Hx    • Drug abuse Neg Hx    • Substance Abuse Neg Hx    • Mental illness Neg Hx        MEDICATIONS:  Scheduled Meds:  Current Facility-Administered Medications   Medication Dose Route Frequency Provider Last Rate   • acetaminophen  650 mg Oral Q4H PRN Izabela Cooper MD     • aspirin  81 mg Oral Daily Izabela Cooper MD     • atorvastatin  40 mg Oral Daily With Rosa Gaming MD     • calcium acetate  667 mg Oral TID With Meals Izabela Cooper MD     • divalproex sodium  250 mg Oral Q12H Lianna Gomez MD     • heparin (porcine)  5,000 Units Subcutaneous [facility] Izabela Cooper MD     • insulin lispro  1-5 Units Subcutaneous TID Lianna Gomez MD     • insulin lispro  1-5 Units Subcutaneous HS Izabela Cooper MD     • metoprolol succinate  25 mg Oral Daily Izabela Cooper MD     • polyethylene glycol  17 g Oral Daily PRN Izabela Cooper MD     • prasugrel  5 mg Oral Daily Izabela Cooper MD     • sodium chloride (PF)  3 mL Intravenous Q1H PRN Valeria Nicholson MD         PRN Meds: •  acetaminophen  •  polyethylene glycol  •  Insert peripheral IV **AND** sodium chloride (PF)    REVIEW OF SYSTEMS:  A complete review of systems was done  Pertinent positives and negatives noted in the HPI but otherwise the review of systems is negative      PHYSICAL EXAM:  Current Weight: Weight - Scale: 96 2 kg (212 lb)  First Weight: Weight - Scale: 96 2 kg (212 lb)  Vitals:    03/09/23 0926   BP: 131/57   Pulse: 77   Resp:    Temp:    SpO2: 95%       Intake/Output Summary (Last 24 hours) at 3/9/2023 6399  Last data filed at 3/9/2023 0355  Gross per 24 hour   Intake --   Output 200 ml   Net -200 ml     General:  appears comfortable and in no acute distress   Skin:  No rash  Eyes:  Sclerae anicteric, no periorbital edema   ENT:  Moist mucous membranes  Neck:  Trachea midline, symmetric   Chest:  Clear to auscultation bilaterally with no wheezes, rales or rhonchi  CVS:  Regular rate and rhythm  Abdomen:  Soft, nontender, nondistended  Neuro:  Awake and alert  Psych:  Appropriate affect  Extremities: no lower extremity edema, LUE AVF     Lab Results:   Results from last 7 days   Lab Units 03/09/23  0541 03/08/23  1455 03/06/23 2028   WBC Thousand/uL 5 30 4 58 8 08   HEMOGLOBIN g/dL 10 2* 10 8* 10 6*   HEMATOCRIT % 32 8* 34 9* 33 0*   PLATELETS Thousands/uL 142* 138* 138*   SODIUM mmol/L 133* 133* 137   POTASSIUM mmol/L 3 9 4 3 4 2   CHLORIDE mmol/L 99 97 94*   CO2 mmol/L 27 31 32   BUN mg/dL 48* 39* 28*   CREATININE mg/dL 6 86* 5 21* 4 72*   CALCIUM mg/dL 9 4 9 6 9 7   MAGNESIUM mg/dL 2 6  --   --        Radiology Results:   X-ray chest 1 view portable   Final Result by Rosa Hughes MD (03/09 6450)      Mild pulmonary vascular congestion  No consolidation               Workstation performed: [id number]

## 2023-03-09 NOTE — PROGRESS NOTES
[facility]  Progress Note - Bhavana Dang 1960, 61 y o  male MRN: [id number]  Unit/Bed#: -01 Encounter: [id number]  Primary Care Provider: Jayro Moss DO   Date and time admitted to hospital: 3/8/2023  2:15 PM      DOS: 3/9/2023  * Chest pain  Assessment & Plan  · Patient presented with chest pain during dialysis, lasted about an hour then resolved  · No EKG changes from priors  · Troponin trend of 125/142/135  · Has history of CAD, multiple vessel on recent cardiac cath in the beginning of February of this year, s/p ADE to in stent restenosis of mid LAD   · Trend EKGs  · Monitor on telemetry  · Cardiology consulted, appreciate recommendations, likely monitor overnight and at dialysis tomorrow to ensure no recurrence of chest pain symptoms     Mood disorder ([facility] [address] )  Assessment & Plan  · Continue home Depakote 250 mg BID  · Mood currently stable  · Reports insomnia while in the hospital, will add melatonin 9 mg QHS here (pt's wife reports that he takes 10 mg at home)    Ischemic cardiomyopathy  Assessment & Plan  · Noted on ECHO from 1/30 with EF 25-30%, moderate AS  · Previously discharged with LifeVest during prior hospitalization   · Appreciate cardiology recommendations     CAD, multiple vessel  Assessment & Plan  · Patient underwent cardiac cath on 2/1/2023 which showed multivessel CAD with marked progression since the prior cath in May 2022, including stent restenosis and occlusion   S/p ADE to mid LAD at that time   · Continue home aspirin, Effient, statin, metoprolol  · Appreciate cardiology consultation as above     ESRD on dialysis [facility])  Assessment & Plan  Lab Results   Component Value Date    EGFR 7 03/09/2023    EGFR 10 03/08/2023    EGFR 12 03/06/2023    CREATININE 6 86 (H) 03/09/2023    CREATININE 5 21 (H) 03/08/2023    CREATININE 4 72 (H) 03/06/2023     · Pt with hx of ESRD on HD MWF  · Nephrology following,  · Plan for dialysis tomorrow  · Monitor hyponatremia    Type 2 diabetes mellitus [facility])  Assessment & Plan  Lab Results   Component Value Date    HGBA1C 5 5 12/23/2022       Recent Labs     03/08/23 2048 03/09/23  0612 03/09/23  1035   POCGLU 118 86 184*       Blood Sugar Average: Last 72 hrs:  (P) 920 [id number]   · Well controlled hgbA1c  · Per patient and family, pt not maintained on insulin or PO medications at home, dietary controlled only   · Continue SSI coverage while inpatient   · QID glucose checks  · Hypoglycemia protocol       VTE Pharmacologic Prophylaxis:   Moderate Risk (Score 3-4) - Pharmacological DVT Prophylaxis Ordered: heparin  Patient Centered Rounds: I evaluated the patient without nursing staff present due to speaking to nurse outside patient's room   Discussions with Specialists or Other Care Team Provider: Discussed with cardiology, RN, CM and reviewed previous notes     Education and Discussions with Family / Patient: Updated  (wife) at bedside  Total Time Spent on Date of Encounter in care of patient: 25 minutes This time was spent on one or more of the following: performing physical exam; counseling and coordination of care; obtaining or reviewing history; documenting in the medical record; reviewing/ordering tests, medications or procedures; communicating with other healthcare professionals and discussing with patient's family/caregivers  Current Length of Stay: 0 day(s)  Current Patient Status: Observation   Certification Statement: The patient, admitted on an observation basis, will now require > 2 midnight hospital stay due to monitoring chest pain symptoms, ongoing cardiology work up  Discharge Plan: Anticipate discharge tomorrow to home  Code Status: Level 1 - Full Code    Subjective:   Pt reports that he is currently feeling well  He denies any chest pain now or shortness of breath   Reports he did not sleep last night and usually takes 10 mg of melatonin at home at baseline, he is agreeable to start 9 mg tonight  Objective:     Vitals:   Temp (24hrs), Av °F (36 7 °C), Min:97 8 °F (36 6 °C), Max:98 1 °F (36 7 °C)    Temp:  [97 8 °F (36 6 °C)-98 1 °F (36 7 °C)] 98 1 °F (36 7 °C)  HR:  [70-79] 77  Resp:  [16-21] 16  BP: (127-142)/(50-70) 142/50  SpO2:  [95 %-98 %] 95 %  Body mass index is 31 31 kg/m²  Input and Output Summary (last 24 hours): Intake/Output Summary (Last 24 hours) at 3/9/2023 1515  Last data filed at 3/9/2023 1058  Gross per 24 hour   Intake 120 ml   Output 200 ml   Net -80 ml       Physical Exam:   Physical Exam  Vitals reviewed  Constitutional:       General: He is not in acute distress  Appearance: He is not toxic-appearing  Comments: Pt is in no acute distress lying in his hospital chair resting comfortably   HENT:      Head: Normocephalic and atraumatic  Cardiovascular:      Rate and Rhythm: Normal rate and regular rhythm  Pulmonary:      Effort: No respiratory distress  Breath sounds: No wheezing  Musculoskeletal:      Right lower leg: No edema  Left lower leg: No edema  Skin:     General: Skin is warm and dry  Neurological:      Mental Status: He is alert     Psychiatric:         Mood and Affect: Mood normal           Additional Data:     Labs:  Results from last 7 days   Lab Units 23  0541 23  1455   WBC Thousand/uL 5 30 4 58   HEMOGLOBIN g/dL 10 2* 10 8*   HEMATOCRIT % 32 8* 34 9*   PLATELETS Thousands/uL 142* 138*   NEUTROS PCT %  --  63   LYMPHS PCT %  --  23   MONOS PCT %  --  11   EOS PCT %  --  2     Results from last 7 days   Lab Units 23  0541   SODIUM mmol/L 133*   POTASSIUM mmol/L 3 9   CHLORIDE mmol/L 99   CO2 mmol/L 27   BUN mg/dL 48*   CREATININE mg/dL 6 86*   ANION GAP mmol/L 7   CALCIUM mg/dL 9 4   ALBUMIN g/dL 3 4*   TOTAL BILIRUBIN mg/dL 0 47   ALK PHOS U/L 62   ALT U/L 12   AST U/L 10   GLUCOSE RANDOM mg/dL 86         Results from last 7 days   Lab Units 23  1035 23  0612 03/08/23 2048   POC GLUCOSE mg/dl 184* 86 118               Lines/Drains:  Invasive Devices     Peripheral Intravenous Line  Duration           Peripheral IV 03/08/23 Dorsal (posterior); Right Forearm 1 day    Peripheral IV 03/08/23 Dorsal (posterior); Right Wrist 1 day          Line  Duration           Hemodialysis AV Fistula Left Upper arm -- days                  Telemetry:  Telemetry Orders (From admission, onward)             LifeVest Patient: Continuous Telemetry Monitoring during hospitalization (non-expiring)  Continuous LifeVest Telemetry Monitoring        References:    LifeVest Policy        24 Hour Telemetry Monitoring  Continuous x 24 Hours ([place])        Expiring   References:    Telemetry Guidelines   Question:  Reason for 24 Hour Telemetry  Answer:  Patients waiting for PCI/EP Study/CABG                 Telemetry Reviewed: Normal Sinus Rhythm  Indication for Continued Telemetry Use: Lifevest (remains on tele entire hospital stay)             Imaging: Reviewed radiology reports from this admission including: chest xray    Recent Cultures (last 7 days):         Last 24 Hours Medication List:   Current Facility-Administered Medications   Medication Dose Route Frequency Provider Last Rate   • acetaminophen  650 mg Oral Q4H PRN Belinda Zuniga MD     • aspirin  81 mg Oral Daily Belinda Zuniga MD     • atorvastatin  40 mg Oral Daily With Shravan Rodriguez MD     • calcium acetate  667 mg Oral TID With Meals Belinda Zuniga MD     • divalproex sodium  250 mg Oral Q12H Santosh Conrad MD     • heparin (porcine)  5,000 Units Subcutaneous [facility] Belinda Zuniga MD     • insulin lispro  1-5 Units Subcutaneous TID Santosh Conrad MD     • insulin lispro  1-5 Units Subcutaneous HS Belinda Zuniga MD     • melatonin  9 mg Oral HS Kellen Hill PA-C     • metoprolol succinate  25 mg Oral Daily Belinda Zuniga MD     • polyethylene glycol  17 g Oral Daily PRN Geovanna Alberto MD     • prasugrel  5 mg Oral Daily Geovanna Alberto MD     • sodium chloride (PF)  3 mL Intravenous Q1H PRN Soco Lal MD          Today, Patient Was Seen By: Mago Chan PA-C    **Please Note: This note may have been constructed using a voice recognition system  **

## 2023-03-09 NOTE — ASSESSMENT & PLAN NOTE
· Continue home Depakote 250 mg BID  · Mood currently stable  · Reports insomnia while in the hospital, will add melatonin 9 mg QHS here (pt's wife reports that he takes 10 mg at home)

## 2023-03-09 NOTE — PATIENT INSTRUCTIONS
1  Blood work  2  Hepatitis vaccines  3  Return every 12 months or sooner if questions  Medicare Preventive Visit Patient Instructions  Thank you for completing your Welcome to Medicare Visit or Medicare Annual Wellness Visit today  Your next wellness visit will be due in one year (5/26/2022)  The screening/preventive services that you may require over the next 5-10 years are detailed below  Some tests may not apply to you based off risk factors and/or age  Screening tests ordered at today's visit but not completed yet may show as past due  Also, please note that scanned in results may not display below  Preventive Screenings:  Service Recommendations Previous Testing/Comments   Colorectal Cancer Screening  · Colonoscopy    · Fecal Occult Blood Test (FOBT)/Fecal Immunochemical Test (FIT)  · Fecal DNA/Cologuard Test  · Flexible Sigmoidoscopy Age: 54-65 years old   Colonoscopy: every 10 years (May be performed more frequently if at higher risk)  OR  FOBT/FIT: every 1 year  OR  Cologuard: every 3 years  OR  Sigmoidoscopy: every 5 years  Screening may be recommended earlier than age 48 if at higher risk for colorectal cancer  Also, an individualized decision between you and your healthcare provider will decide whether screening between the ages of 74-80 would be appropriate   Colonoscopy: Not on file  FOBT/FIT: Not on file  Cologuard: Not on file  Sigmoidoscopy: Not on file          Prostate Cancer Screening Individualized decision between patient and health care provider in men between ages of 53-78   Medicare will cover every 12 months beginning on the day after your 50th birthday PSA: No results in last 5 years           Hepatitis C Screening Once for adults born between 1945 and 1965  More frequently in patients at high risk for Hepatitis C Hep C Antibody: 10/06/2020    Screening Current   Diabetes Screening 1-2 times per year if you're at risk for diabetes or have pre-diabetes Fasting glucose: 102 mg/dL   A1C: 5 8 %    Screening Not Indicated  History Diabetes   Cholesterol Screening Once every 5 years if you don't have a lipid disorder  May order more often based on risk factors  Lipid panel: 01/11/2020    Screening Not Indicated  History Lipid Disorder      Other Preventive Screenings Covered by Medicare:  1  Abdominal Aortic Aneurysm (AAA) Screening: covered once if your at risk  You're considered to be at risk if you have a family history of AAA or a male between the age of 73-68 who smoking at least 100 cigarettes in your lifetime  2  Lung Cancer Screening: covers low dose CT scan once per year if you meet all of the following conditions: (1) Age 50-69; (2) No signs or symptoms of lung cancer; (3) Current smoker or have quit smoking within the last 15 years; (4) You have a tobacco smoking history of at least 30 pack years (packs per day x number of years you smoked); (5) You get a written order from a healthcare provider  3  Glaucoma Screening: covered annually if you're considered high risk: (1) You have diabetes OR (2) Family history of glaucoma OR (3)  aged 48 and older OR (3)  American aged 72 and older  3  Osteoporosis Screening: covered every 2 years if you meet one of the following conditions: (1) Have a vertebral abnormality; (2) On glucocorticoid therapy for more than 3 months; (3) Have primary hyperparathyroidism; (4) On osteoporosis medications and need to assess response to drug therapy  5  HIV Screening: covered annually if you're between the age of 12-76  Also covered annually if you are younger than 13 and older than 72 with risk factors for HIV infection  For pregnant patients, it is covered up to 3 times per pregnancy      Immunizations:  Immunization Recommendations   Influenza Vaccine Annual influenza vaccination during flu season is recommended for all persons aged >= 6 months who do not have contraindications   Pneumococcal Vaccine (Prevnar and Pneumovax)  * Prevnar = PCV13  * Pneumovax = PPSV23 Adults 25-60 years old: 1-3 doses may be recommended based on certain risk factors  Adults 72 years old: Prevnar (PCV13) vaccine recommended followed by Pneumovax (PPSV23) vaccine  If already received PPSV23 since turning 65, then PCV13 recommended at least one year after PPSV23 dose  Hepatitis B Vaccine 3 dose series if at intermediate or high risk (ex: diabetes, end stage renal disease, liver disease)   Tetanus (Td) Vaccine - COST NOT COVERED BY MEDICARE PART B Following completion of primary series, a booster dose should be given every 10 years to maintain immunity against tetanus  Td may also be given as tetanus wound prophylaxis  Tdap Vaccine - COST NOT COVERED BY MEDICARE PART B Recommended at least once for all adults  For pregnant patients, recommended with each pregnancy  Shingles Vaccine (Shingrix) - COST NOT COVERED BY MEDICARE PART B  2 shot series recommended in those aged 48 and above     Health Maintenance Due:      Topic Date Due    Colorectal Cancer Screening  Never done    HIV Screening  Completed    Hepatitis C Screening  Completed     Immunizations Due:      Topic Date Due    Hepatitis A Vaccine (1 of 2 - Risk 2-dose series) Never done    Pneumococcal Vaccine: Pediatrics (0 to 5 Years) and At-Risk Patients (6 to 59 Years) (2 of 3 - PCV13) 09/21/2019     Advance Directives   What are advance directives? Advance directives are legal documents that state your wishes and plans for medical care  These plans are made ahead of time in case you lose your ability to make decisions for yourself  Advance directives can apply to any medical decision, such as the treatments you want, and if you want to donate organs  What are the types of advance directives? There are many types of advance directives, and each state has rules about how to use them  You may choose a combination of any of the following:  · Living will:   This is a written record of the treatment you want  You can also choose which treatments you do not want, which to limit, and which to stop at a certain time  This includes surgery, medicine, IV fluid, and tube feedings  · Durable power of  for healthcare [facility]): This is a written record that states who you want to make healthcare choices for you when you are unable to make them for yourself  This person, called a proxy, is usually a family member or a friend  You may choose more than 1 proxy  · Do not resuscitate (DNR) order:  A DNR order is used in case your heart stops beating or you stop breathing  It is a request not to have certain forms of treatment, such as CPR  A DNR order may be included in other types of advance directives  · Medical directive: This covers the care that you want if you are in a coma, near death, or unable to make decisions for yourself  You can list the treatments you want for each condition  Treatment may include pain medicine, surgery, blood transfusions, dialysis, IV or tube feedings, and a ventilator (breathing machine)  · Values history: This document has questions about your views, beliefs, and how you feel and think about life  This information can help others choose the care that you would choose  Why are advance directives important? An advance directive helps you control your care  Although spoken wishes may be used, it is better to have your wishes written down  Spoken wishes can be misunderstood, or not followed  Treatments may be given even if you do not want them  An advance directive may make it easier for your family to make difficult choices about your care  Weight Management   Why it is important to manage your weight:  Being overweight increases your risk of health conditions such as heart disease, high blood pressure, type 2 diabetes, and certain types of cancer  It can also increase your risk for osteoarthritis, sleep apnea, and other respiratory problems  Aim for a slow, steady weight loss  Even a small amount of weight loss can lower your risk of health problems  How to lose weight safely:  A safe and healthy way to lose weight is to eat fewer calories and get regular exercise  You can lose up about 1 pound a week by decreasing the number of calories you eat by 500 calories each day  Healthy meal plan for weight management:  A healthy meal plan includes a variety of foods, contains fewer calories, and helps you stay healthy  A healthy meal plan includes the following:  · Eat whole-grain foods more often  A healthy meal plan should contain fiber  Fiber is the part of grains, fruits, and vegetables that is not broken down by your body  Whole-grain foods are healthy and provide extra fiber in your diet  Some examples of whole-grain foods are whole-wheat breads and pastas, oatmeal, brown rice, and bulgur  · Eat a variety of vegetables every day  Include dark, leafy greens such as spinach, kale, art greens, and mustard greens  Eat yellow and orange vegetables such as carrots, sweet potatoes, and winter squash  · Eat a variety of fruits every day  Choose fresh or canned fruit (canned in its own juice or light syrup) instead of juice  Fruit juice has very little or no fiber  · Eat low-fat dairy foods  Drink fat-free (skim) milk or 1% milk  Eat fat-free yogurt and low-fat cottage cheese  Try low-fat cheeses such as mozzarella and other reduced-fat cheeses  · Choose meat and other protein foods that are low in fat  Choose beans or other legumes such as split peas or lentils  Choose fish, skinless poultry (chicken or turkey), or lean cuts of red meat (beef or pork)  Before you cook meat or poultry, cut off any visible fat  · Use less fat and oil  Try baking foods instead of frying them  Add less fat, such as margarine, sour cream, regular salad dressing and mayonnaise to foods  Eat fewer high-fat foods   Some examples of high-fat foods include french fries, doughnuts, ice cream, and cakes   · Eat fewer sweets  Limit foods and drinks that are high in sugar  This includes candy, cookies, regular soda, and sweetened drinks  Exercise:  Exercise at least 30 minutes per day on most days of the week  Some examples of exercise include walking, biking, dancing, and swimming  You can also fit in more physical activity by taking the stairs instead of the elevator or parking farther away from stores  Ask your healthcare provider about the best exercise plan for you  Narcotic (Opioid) Safety    Use narcotics safely:  · Take prescribed narcotics exactly as directed  · Do not give narcotics to others or take narcotics that belong to someone else  · Do not mix narcotics without medicines or alcohol  · Do not drive or operate heavy machinery after you take the narcotic  · Monitor for side effects and notify your healthcare provider if you experienced side effects such as nausea, sleepiness, itching, or trouble thinking clearly  Manage constipation:    Constipation is the most common side effect of narcotic medicine  Constipation is when you have hard, dry bowel movements, or you go longer than usual between bowel movements  Tell your healthcare provider about all changes in your bowel movements while you are taking narcotics  He or she may recommend laxative medicine to help you have a bowel movement  He or she may also change the kind of narcotic you are taking, or change when you take it  The following are more ways you can prevent or relieve constipation:    · Drink liquids as directed  You may need to drink extra liquids to help soften and move your bowels  Ask how much liquid to drink each day and which liquids are best for you  · Eat high-fiber foods  This may help decrease constipation by adding bulk to your bowel movements  High-fiber foods include fruits, vegetables, whole-grain breads and cereals, and beans  Your healthcare provider or dietitian can help you create a high-fiber meal plan  Your provider may also recommend a fiber supplement if you cannot get enough fiber from food  · Exercise regularly  Regular physical activity can help stimulate your intestines  Walking is a good exercise to prevent or relieve constipation  Ask which exercises are best for you  · Schedule a time each day to have a bowel movement  This may help train your body to have regular bowel movements  Bend forward while you are on the toilet to help move the bowel movement out  Sit on the toilet for at least 10 minutes, even if you do not have a bowel movement  Store narcotics safely:   · Store narcotics where others cannot easily get them  Keep them in a locked cabinet or secure area  Do not  keep them in a purse or other bag you carry with you  A person may be looking for something else and find the narcotics  · Make sure narcotics are stored out of the reach of children  A child can easily overdose on narcotics  Narcotics may look like candy to a small child  The best way to dispose of narcotics: The laws vary by country and area  In the [place] [facility], the best way is to return the narcotics through a take-back program  This program is offered by the [employer]  The following are options for using the program:  · Take the narcotics to a PAYAL collection site  The site is often a law enforcement center  Call your local law enforcement center for scheduled take-back days in your area  You will be given information on where to go if the collection site is in a different location  · Take the narcotics to an approved pharmacy or hospital   A pharmacy or hospital may be set up as a collection site  You will need to ask if it is a PAYAL collection site if you were not directed there  A pharmacy or doctor's office may not be able to take back narcotics unless it is a PAYAL site  · Use a mail-back system  This means you are given containers to put the narcotics into   You will then mail them in the containers  · Use a take-back drop box  This is a place to leave the narcotics at any time  People and animals will not be able to get into the box  Your local law enforcement agency can tell you where to find a drop box in your area  Other ways to manage pain:   · Ask your healthcare provider about non-narcotic medicines to control pain  Nonprescription medicines include NSAIDs (such as ibuprofen) and acetaminophen  Prescription medicines include muscle relaxers, antidepressants, and steroids  · Pain may be managed without any medicines  Some ways to relieve pain include massage, aromatherapy, or meditation  Physical or occupational therapy may also help  For more information:   · Drug Enforcement Administration  [facility] [address]  Phone: 8- 435 - [phone]  Web Address: [facility]/drug_disposal/    · [address]  and Drug Administration  [facility] , [address] [facility]  Phone: 7- 872 - [phone]  Web Address: http://mSpot/     © Copyright [address] 2018 Information is for End User's use only and may not be sold, redistributed or otherwise used for commercial purposes   All illustrations and images included in CareNotes® are the copyrighted property of A D A M , Inc  or [address] [place] [address] yes

## 2023-03-09 NOTE — CONSULTS
[address] Cardiology  CONSULT    Patient Name: Merlin Penaloza  Patient MRN: [id number]  Admission Date: 3/8/2023  2:15 PM  Attending Provider: Myriam Vann MD  Service: Cardiology    Reason for consult: Chest pain, CAD      Assessment / Plan  This is a 61 y o  male who presents with    #Non-MI Troponin elevation  #CAD  #Ischemic cardiomyopathy- heart failure with reduced EF (initially 25-30%, recovered to 35-40% on most recent echo at [facility])  #ESRD on HD  #Type 2 DM  -Presenting for evaluation of chest pain while receiving dialysis  He recently underwent coronary angiography with ADE placement to in stent re-stenosis of a prior LAD lesion that was treated  In the setting of known residual disease that was not amendable to intervention at the time of his last cath, I do suspect that he may be experiencing anginal chest pain associated with decreased preload during dialysis despite fairly insignificant volume removal at his last session  He is completely chest pain-free at this time, and states he is also able to ambulate without experiencing chest discomfort  The patient also recently fell onto his left shoulder last week, with reproducible pain over palpation of the left shoulder on exam today  This fall injury is likely confounding his presentation a bit  No fracture or pathology on XR of the shoulder 3/6    -He has an ischemic cardiomyopathy, with reported improvement in LV systolic function  I did caution the patient and his son in regards to interobserver variability in LV systolic function estimation, recognizing that there is generally a 5-10% interobserver variability in EF estimation  As I am not able to personally review the images from the [facility] echo performed on 2/28, I will have to assume that his LV systolic function did marginally improve with GDMT although his regimen is not optimal in the absence of ARNI or SGLT2 therapy    The patient and his son expressed concerns that initiating either of these medication classes would result in him becoming not a candidate for renal transplantation  I did attempt to explain that his candidacy for renal transplantation is also likely affected by persistently reduced LV systolic function, therefore from a cardiovascular standpoint I feel that his best chance at being medically optimized for a transplant would be if he had complete recovery of LV systolic function   -I have recommended to the patient that he continue his current medication regimen and continue to monitor for symptoms  May be beneficial to keep him inpatient for dialysis tomorrow so that he may be monitored while undergoing dialysis to ensure no recurrence of pain  -Previously deemed a Plavix nonresponder due to suspected in-stent restenosis  Maintained on Effient 5 mg once daily, although its unclear to me why he is on this medication with a known history of CVA in the past and a clear expert consensus warning on the use of this medication in patients with a history of stroke due to a signal towards increased intracerebral hemorrhage in this patient population  He was converted from Brilinta to Effient at some point after his catheterization last admission for unclear reasons  RECOMMENDATIONS:  - Continue home medication regimen consisting of dual antiplatelet therapy, aspirin and prasugrel  We will need to closely consider switching prasugrel to Brilinta due to history of stroke unless there is a clear contraindication to Brilinta  This needs to be addressed one way or another prior to discharge  - Continue Toprol-XL 25 mg once daily  We should first aim to up titrate this dose, starting with 25mg BID  If additional antianginal support is needed, I first recommend initiating Imdur 30 mg once daily    This will allow for additional dose titration as tolerated by his blood pressure, still leave room for the introduction of Ranexa for anginal relief if maximally tolerated long-acting nitrate therapy is insufficient  - I encouraged the patient to ambulate with assistance in the halls to ensure no ambulatory/exertional anginal complaints  - I recommend that his next dialysis session tomorrow be done inpatient if feasible to allow for him to be monitored for symptom recurrence while in a hospital setting  Principal Problem:    Chest pain  Active Problems:    Type 2 diabetes mellitus (HCC)    ESRD on dialysis ([facility] [address] )    CAD, multiple vessel    Mood disorder ([facility] )      Code Status: Level 1 - Full Code          HPI    This is a 61 y o  male with a past medical history significant for CAD status post multiple stents and repeat intervention, ESRD on hemodialysis, type 2 diabetes, diabetic polyneuropathy, hypertension, hyperlipidemia, CVA, obesity  Presently admitted to the hospital after complaining of chest discomfort during his hemodialysis session yesterday  The patient reports left-sided chest discomfort that lasted approximately 1 hour until dialysis was completed  At the time of my evaluation, he is completely free of chest pain  He also denies shortness of breath, diaphoresis, dizziness, palpitations, orthopnea, PND, edema, syncope  His past medical history from a cardiovascular standpoint is very well-documented and outlined in the office visit note performed by my colleague [facility], [place] on 2/14/2023  Please refer to that note for additional details regarding the patient's history      Review of Systems  10 point review of systems negative except as noted in HPI    Past Medical History:   Diagnosis Date   • Cerebrovascular accident (CVA) due to thrombosis of left middle cerebral artery ([facility] ) 7/29/2018   • Chronic kidney disease    • Diabetes mellitus ([facility] )    • GERD (gastroesophageal reflux disease)    • Hypercholesteremia    • Hyperlipidemia    • Hypertension    • 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   • Neuropathy    • Obesity    • Osteomyelitis ([facility] )     last assessed 11/4/16 • PVC's (premature ventricular contractions)     sees cardiology Dr Dat camargo   • Stroke [facility])     last weeof July 2018 [address]   • TIA (transient ischemic attack) 10/28/2018       Past Surgical History:   Procedure Laterality Date   • ABDOMINAL SURGERY     • CARDIAC CATHETERIZATION N/A 5/2/2022    Procedure: Cardiac Coronary Angiogram;  Surgeon: Karishma Hirsch MD;  [facility]: [facility] CARDIAC CATH LAB; Service: Cardiology   • CARDIAC CATHETERIZATION N/A 5/2/2022    Procedure: Cardiac pci;  Surgeon: Karishma Hirsch MD;  [facility]: [facility] CARDIAC CATH LAB; Service: Cardiology   • CARDIAC CATHETERIZATION  2/1/2023    Procedure: Cardiac catheterization;  Surgeon: Karishma Hrisch MD;  Location: [facility] CARDIAC CATH [facility]; Service: Cardiology   • CARDIAC CATHETERIZATION N/A 2/1/2023    Procedure: Cardiac pci;  Surgeon: Karishma Hirsch MD;  Location: [facility] CARDIAC CATH LAB; Service: Cardiology   • CARDIAC CATHETERIZATION N/A 2/1/2023    Procedure: Cardiac Coronary Angiogram;  Surgeon: Karishma Hirsch MD;  Location: [facility] CARDIAC CATH LAB; Service: Cardiology   • CARDIAC CATHETERIZATION N/A 2/1/2023    Procedure: Cardiac other-IVUS;  Surgeon: Karishma Hirsch MD;  Location: [facility] CARDIAC CATH LAB; Service: Cardiology   • CHOLECYSTECTOMY      Percutaneous   • COLONOSCOPY     • CYSTOSCOPY     • OTHER SURGICAL HISTORY      "stimulator to control bowel movements"   • NV ESOPHAGOGASTRODUODENOSCOPY TRANSORAL DIAGNOSTIC N/A 9/27/2016    Procedure: ESOPHAGOGASTRODUODENOSCOPY (EGD); Surgeon: Nick Rose MD;  Location: [facility] GI LAB;   Service: Gastroenterology   • NV LAPAROSCOPY SURG CHOLECYSTECTOMY N/A 2/29/2016    Procedure: LAPAROSCOPIC CHOLECYSTECTOMY ;  Surgeon: Jackelin Leahy DO;  Location: [facility] Main OR;  Service: General   • ROTATOR CUFF REPAIR Right    • TOE AMPUTATION Right 10/28/2016    Procedure: 3RD TOE AMPUTATION ;  Surgeon: Nisha Lin DPM;  Location: [facility] Main OR;  Service:        Family History   Problem Relation Age of Onset   • Leukemia Mother    • Liver disease Mother    • Lung cancer Mother         heavy smoker - 3 ppd   • Heart disease Father    • Liver disease Father    • Multiple myeloma Sister    • Breast cancer Sister    • Urolithiasis Family    • Alcohol abuse Neg Hx    • Depression Neg Hx    • Drug abuse Neg Hx    • Substance Abuse Neg Hx    • Mental illness Neg Hx        Social History     Tobacco Use   • Smoking status: Never   • Smokeless tobacco: Never   Substance Use Topics   • Alcohol use: Not Currently       Vitals:    03/09/23 0926   BP: 131/57   Pulse: 77   Resp:    Temp:    SpO2: 95%        I/O last 3 completed shifts:  In: -   Out: 200 [Urine:200]  No intake/output data recorded  Oxygen:  O2 Device: None (Room air)              Physical Exam  General Appearance: Alert, cooperative, no distress, appears stated age  Head: Normocephalic, without obvious abnormality, atraumatic  Eyes: conjunctiva/corneas clear  Neck: No JVD  Lungs: Clear to auscultation bilaterally  Heart: RRR, S1 and S2 normal, no murmur, rub or gallop  Left precordial and shoulder tenderness to palpation    Abdomen: Soft, non-tender, bowel sounds active  Extremities: no edema  Skin: Warm and dry    Current Medications:  Scheduled Meds:  Current Facility-Administered Medications   Medication Dose Route Frequency Provider Last Rate   • acetaminophen  650 mg Oral Q4H PRN Ashanti Gusman MD     • aspirin  81 mg Oral Daily Ashanti Gusman MD     • atorvastatin  40 mg Oral Daily With Rikki Fernández MD     • calcium acetate  667 mg Oral TID With Meals Ashanti Gusman MD     • divalproex sodium  250 mg Oral Q12H Edilia Barnett MD     • heparin (porcine)  5,000 Units Subcutaneous [facility] Ashanti Gusman MD     • insulin lispro  1-5 Units Subcutaneous TID Edilia Barnett MD     • insulin lispro  1-5 Units Subcutaneous HS Ashanti Gusman MD     • metoprolol succinate 25 mg Oral Daily Shana Pacheco MD     • polyethylene glycol  17 g Oral Daily PRN Shana Pacheco MD     • prasugrel  5 mg Oral Daily Shana Pacheco MD     • sodium chloride (PF)  3 mL Intravenous Q1H PRN Tomi Asencio MD       Continuous Infusions:   PRN Meds: •  acetaminophen  •  polyethylene glycol  •  Insert peripheral IV **AND** sodium chloride (PF)    Laboratory Studies:  Lab Results   Component Value Date    WBC 5 30 2023    HGB 10 2 (L) 2023    HCT 32 8 (L) 2023    MCV 96 2023     (L) 2023       Lab Results   Component Value Date    GLUCOSE 142 (H) 2023    CALCIUM 9 4 2023    SODIUM 133 (L) 2023    K 3 9 2023    CO2 27 2023    CL 99 2023    BUN 48 (H) 2023    CREATININE 6 86 (H) 2023       Lab Results   Component Value Date    HGBA1C 5 5 2022       No results found for: TSH    Lab Results   Component Value Date    CHOL 203 (H) 08/10/2016    HDL 30 (L) 2023    LDLCALC 21 2023    TRIG 123 2023       Troponin  No results for input(s): CKTOTAL, CKMB, CKMBINDEX, TROPONIN in the last 72 hours  Lactate  No results for input(s): LACTATE in the last 72 hours  CARDIAC IMAGING:    Cardiac testing:     Results for orders placed during the hospital encounter of 10/05/20    Echo complete with contrast if indicated    Narrative  [place] [address], [facility]  [phone]    Transthoracic Echocardiogram  2D, M-mode, Doppler, and Color Doppler    Study date:  06-Oct-2020    Patient: Roberta Chapman  MR number: [id number]  Account number: [de-identified]  : 1960  Age: 61 years  Gender: Male  Status: Inpatient  Location: Bedside  Height: 70 in  Weight: 239 6 lb  BP: 165/ 80 mmHg    Indications: Shortness of breath      Diagnoses: R06 02 - Shortness of breath    Sonographer:  Emilee Urbina RDCS  Primary Physician:  DO Vidhi Aquino Physician:  Harley Hussein MD  Group:  [facility]  Interpreting Physician:  Pavan Simon MD    SUMMARY    LEFT VENTRICLE:  Systolic function was normal by visual assessment  Ejection fraction was estimated to be 60 %  There were no regional wall motion abnormalities  Wall thickness was moderately increased  Features were consistent with a pseudonormal left ventricular filling pattern, with concomitant abnormal relaxation and increased filling pressure (grade 2 diastolic dysfunction)  LEFT ATRIUM:  The atrium was mildly dilated  RIGHT ATRIUM:  The atrium was mildly dilated  MITRAL VALVE:  There was moderate annular calcification  There was mild regurgitation  AORTIC VALVE:  The valve was trileaflet  Leaflets exhibited normal thickness, moderate calcification, and moderately reduced cuspal separation  Transaortic velocity was increased due to valvular stenosis  There was mild to moderate stenosis  There was mild regurgitation  TRICUSPID VALVE:  There was trace regurgitation  PULMONIC VALVE:  There was mild regurgitation  HISTORY: PRIOR HISTORY: DM2  CKD4  Hypertension  CVA  GERD  Dementia  PROCEDURE: The procedure was performed at the bedside  This was a routine study  The transthoracic approach was used  The study included complete 2D imaging, M-mode, complete spectral Doppler, and color Doppler  The heart rate was 98 bpm,  at the start of the study  Image quality was adequate  LEFT VENTRICLE: Size was normal  Systolic function was normal by visual assessment  Ejection fraction was estimated to be 60 %  There were no regional wall motion abnormalities  Wall thickness was moderately increased  DOPPLER: The ratio  of early ventricular filling to atrial contraction velocities was within the normal range   Features were consistent with a pseudonormal left ventricular filling pattern, with concomitant abnormal relaxation and increased filling pressure  (grade 2 diastolic dysfunction)  RIGHT VENTRICLE: The size was normal  Systolic function was normal  DOPPLER: Systolic pressure was not estimated  LEFT ATRIUM: The atrium was mildly dilated  RIGHT ATRIUM: The atrium was mildly dilated  MITRAL VALVE: There was moderate annular calcification  Valve structure was normal  There was normal leaflet separation  DOPPLER: The transmitral velocity was within the normal range  There was no evidence for stenosis  There was mild  regurgitation  AORTIC VALVE: The valve was trileaflet  Leaflets exhibited normal thickness, moderate calcification, and moderately reduced cuspal separation  DOPPLER: Transaortic velocity was increased due to valvular stenosis  There was mild to moderate  stenosis  There was mild regurgitation  TRICUSPID VALVE: The valve structure was normal  There was normal leaflet separation  DOPPLER: The transtricuspid velocity was within the normal range  There was no evidence for stenosis  There was trace regurgitation  PULMONIC VALVE: Leaflets exhibited normal thickness, no calcification, and normal cuspal separation  DOPPLER: The transpulmonic velocity was within the normal range  There was mild regurgitation  PERICARDIUM: There was no pericardial effusion  AORTA: The root exhibited normal size  SYSTEMIC VEINS: IVC: The inferior vena cava was normal in size and course  Respirophasic changes were normal     SYSTEM MEASUREMENT TABLES    2D  %FS: 36 09 %  Ao Diam: 3 8 cm  EDV(Teich): 182 77 ml  EF(Teich): 64 8 %  ESV(Teich): 64 33 ml  IVSd: 1 57 cm  LA Area: 24 31 cm2  LA Diam: 4 63 cm  LVEDV MOD A4C: 192 34 ml  LVEF MOD A4C: 65 32 %  LVESV MOD A4C: 66 7 ml  LVIDd: 6 04 cm  LVIDs: 3 86 cm  LVLd A4C: 9 02 cm  LVLs A4C: 7 15 cm  LVOT Diam: 2 21 cm  LVPWd: 1 61 cm  RA Area: 17 56 cm2  RVIDd: 4 cm  SV MOD A4C: 125 64 ml  SV(Teich): 118 44 ml    CW  AV Env  Ti: 330 16 ms  AV MaxP 64 mmHg  AV VTI: 57 9 cm  AV Vmax: 2 58 m/s  AV Vmean: 1 76 m/s  AV meanP 38 mmHg  TR MaxP 37 mmHg  TR Vmax: 2 71 m/s    MM  TAPSE: 1 84 cm    PW  FATUMA (VTI): 1 27 cm2  FATUMA Vmax: 1 42 cm2  AVAI (VTI): 0 cm2/m2  AVAI Vmax: 0 cm2/m2  E' Sept: 0 07 m/s  E/E' Sept: 12 69  LVOT Env  Ti: 335 87 ms  LVOT VTI: 19 11 cm  LVOT Vmax: 0 95 m/s  LVOT Vmean: 0 57 m/s  LVOT maxPG: 3 63 mmHg  LVOT meanP 61 mmHg  LVSI Dopp: 32 42 ml/m2  LVSV Dopp: 73 27 ml  MV A [place]: 0 64 m/s  MV Dec [place]: 4 7 m/s2  MV DecT: 189 67 ms  MV E [place]lin: 0 89 m/s  MV E/A Ratio: 1 38  MV PHT: 55 01 ms  MVA By PHT: 4 cm2    [address] Accredited Echocardiography Laboratory    Prepared and electronically signed by    Breonna Felton MD  Signed 06-Oct-2020 16:31:49      Cardiac testing  · ECHO 23 [place]  Left Ventricle: LV size is normal  LV wall thickness is moderately increased  Left ventricular systolic function is moderately decreased  Left ventricular ejection fraction is 35 to 40%  There is hypokinesis of the basal inferior wall, the mid-apical lateral and inferolateral walls, and the apex  Right Ventricle: Right ventricular size is normal  The right ventricular systolic function is normal    Left Atrium: There is mild left atrial enlargement  The LA Volume is 78 4 ml  Left atrium volume index is 37 2 ml/mÂ²  Right Atrium: Right atrial size is normal  Right atrium volume index is 18 1 ml/m2  Cardiac Valves:     Regurgitation from all valves was evaluated by color flow Doppler  Aortic Valve: The aortic valve is tricuspid  There is mild aortic valve stenosis  The aortic valve peak gradient is 19 5 mmHg and the mean gradient is 11 9 mmHg  The aortic valve peak velocity is 2 2 m/s  Moderate aortic valve leaflet thickening  Mild aortic insufficiency is present  The LVOT diameter is 27 8 mm  The LVOT VTI is 15 9 cm  The aortic valve VTI is 51 4 cm  The aortic valve area by the continuity equation measures 1 9 cmÂ²  The aortic valve area index is 0 9 cmÂ²/mÂ²   The dimensionless index is 0 31  There is severe restriction of the right and left coronary cusps with preserved motion of the non coronary cusp  Mitral Valve: Moderate thickening of the mitral valve leaflets  There is mild mitral annular calcification noted on the posterior annulus  Mild mitral regurgitation is present  Tricuspid Valve: The tricuspid valve is structurally normal  Trace tricuspid regurgitation is present  Pulmonic Valve: The structure of the pulmonic valve is normal  Mild pulmonary regurgitation is present  Other:   Pulmonary Artery Pressure: The pulmonary artery systolic pressure cannot be determined  Aorta: Aortic root dimension is normal  There is no Doppler evidence of aortic coarctation  Pericardium: No pericardial effusion  IVC and Hepatic Veins: The inferior vena cava was not visualized  Conclusions:   1  LV size is normal  LV wall thickness is moderately increased  Left ventricular systolic function is moderately decreased  Segmental wall motion abnormalities are present (see above)  2  There is mild left atrial enlargement  Right atrial size is normal    3  Right ventricular size is normal  The right ventricular systolic function is normal    4  Moderate aortic valve leaflet thickening  There is mild aortic valve stenosis  Mild aortic insufficiency is present  Aortic root dimension is normal    5  Moderate thickening of the mitral valve leaflets  Mild mitral regurgitation is present  6  The tricuspid valve is structurally normal  Trace tricuspid regurgitation is present  The structure of the pulmonic valve is normal  Mild pulmonary regurgitation is present  7  No pericardial effusion  • [facility] 5/2/22 [facility]  Mid LAD: 50% stenosed  Mid circumflex: 50% stenosed  OM1: 95% stenosed  RCA: 50% stenosed  RPAV artery: 90% stenosed  Intervention:ADE OM1  There was non-critical disease of the LAD and mid circumflex   There was a severe stenosis of the small distal RCA beyond the origin of the PDA  Medical therapy of non-critical left system disease and diffuse severe disease of the small distal RCA  • [facility]  ? Successful balloon angioplasty of the in-stent thrombus of the LAD stent  •  Patent stent in the mCX   •  1st Mrg lesion is 70% stenosed  (ISR)   •  Dist RCA lesion is 70% stenosed, assessed by IFR   •  RPDA lesion is 80% stenosed  •  Prox LAD to Mid LAD lesion is 70% stenosed  • TTE 1/30/23  EF  25-30%  Wall thickness is mildly increased  Mild LVH  There is mild global hypokinesis  Normal RV size and function  Mild AI  Moderate aortic stenosis  The aortic valve mean gradient is 9 mmHg  The aortic valve area is 1 97 cm2  Moderate mitral vegetation  Severe mitral annular calcification  The following segments are akinetic: basal inferior and mid inferior  The following segments are hypokinetic: basal anterior, basal anteroseptal, basal inferoseptal, basal inferolateral, basal anterolateral, mid anterior, mid anteroseptal, mid inferoseptal, mid inferolateral, mid anterolateral, apical anterior, apical septal, apical inferior, apical lateral and apex  Est RVSP 50 mm HG  • Cardiac catheterization 2/1/23  1st Mrg lesion is 100% stenosed  •  RPAV lesion is 100% stenosed  •  Mid LAD lesion is 80% stenosed  •  RPDA lesion is 60% stenosed  •  The angioplasty was pre-stent angioplasty  •  The angioplasty was pre-stent angioplasty  Multivessel CAD with marked progression since the prior angiographic study in May 2022   Stents placed in the mid LAD exhibited significant discrete and-stent restenosis   The first OM branch, stented in 5/22, has become occluded   The distal RCA beyond the PDA has akso become occluded  LAD in--stent restenosis, treated with a ADE  An attempt was made to wire the occluded OM branch, but the wire could not be advanced beyond the stent in mid vessel     Plan: DAPT, statin, compliance with medical and hemodialysis therapy, increased activity, weight loss  Discussed in detail with the patient's family  ECG:  Sinus rhythm, RBBB, LVH  IMAGING  X-ray chest 1 view portable   Final Result      Mild pulmonary vascular congestion  No consolidation               Workstation performed: [id number]                Raya Lutz MD

## 2023-03-09 NOTE — OCCUPATIONAL THERAPY NOTE
Occupational Therapy Evaluation     Patient Name: Gumaro Lucas  FCUHG'T Date: 3/9/2023  Problem List  Principal Problem:    Chest pain  Active Problems:    Type 2 diabetes mellitus (HCC)    ESRD on dialysis ([place] [address] )    CAD, multiple vessel    Mood disorder [facility])    Past Medical History  Past Medical History:   Diagnosis Date    Cerebrovascular accident (CVA) due to thrombosis of left middle cerebral artery ([place] [address] ) 7/29/2018    Chronic kidney disease     Diabetes mellitus ([place] [address] )     GERD (gastroesophageal reflux disease)     Hypercholesteremia     Hyperlipidemia     Hypertension     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    Neuropathy     Obesity     Osteomyelitis ([place] [address] )     last assessed 11/4/16    PVC's (premature ventricular contractions)     sees cardiology Dr Naif camargo    Stroke [facility])     last weeof July 2018 [address] [facility]    TIA (transient ischemic attack) 10/28/2018     Past Surgical History  Past Surgical History:   Procedure Laterality Date    ABDOMINAL SURGERY      CARDIAC CATHETERIZATION N/A 5/2/2022    Procedure: Cardiac Coronary Angiogram;  Surgeon: Sarika Hendrix MD;  Location: [facility] CARDIAC CATH LAB; Service: Cardiology    CARDIAC CATHETERIZATION N/A 5/2/2022    Procedure: Cardiac pci;  Surgeon: Sarika Hendrix MD;  Location: [facility] CARDIAC CATH LAB; Service: Cardiology    CARDIAC CATHETERIZATION  2/1/2023    Procedure: Cardiac catheterization;  Surgeon: Sarika Hendrix MD;  Location: [facility] CARDIAC CATH LAB; Service: Cardiology    CARDIAC CATHETERIZATION N/A 2/1/2023    Procedure: Cardiac pci;  Surgeon: Sarika Hendrix MD;  Location: [facility] CARDIAC CATH LAB; Service: Cardiology    CARDIAC CATHETERIZATION N/A 2/1/2023    Procedure: Cardiac Coronary Angiogram;  Surgeon: Sarika Hendrix MD;  Location: [facility] CARDIAC CATH LAB; Service: Cardiology    CARDIAC CATHETERIZATION N/A 2/1/2023    Procedure: Cardiac other-IVUS;  Surgeon: Sarika Hendrix MD;  Location: [facility] CARDIAC CATH LAB;   Service: Cardiology CHOLECYSTECTOMY      Percutaneous    COLONOSCOPY      CYSTOSCOPY      OTHER SURGICAL HISTORY      "stimulator to control bowel movements"    VT ESOPHAGOGASTRODUODENOSCOPY TRANSORAL DIAGNOSTIC N/A 9/27/2016    Procedure: ESOPHAGOGASTRODUODENOSCOPY (EGD); Surgeon: Jose Diana MD;  Location: [facility] GI LAB; Service: Gastroenterology    VT LAPAROSCOPY SURG CHOLECYSTECTOMY N/A 2/29/2016    Procedure: LAPAROSCOPIC CHOLECYSTECTOMY ;  Surgeon: Kayy Cervantes DO;  Location: [facility] Main OR;  Service: General    ROTATOR CUFF REPAIR Right     TOE AMPUTATION Right 10/28/2016    Procedure: 3RD TOE AMPUTATION ;  Surgeon: Winifred Harp DPM;  Location: [facility] Main OR;  Service:            03/09/23 0830   OT Last Visit   OT Visit Date 03/09/23   Note Type   Note type Evaluation   Pain Assessment   Pain Assessment Tool 0-10   Pain Score No Pain   Restrictions/Precautions   Weight Bearing Precautions Per Order No   Other Precautions Fall Risk;Telemetry   Home Living   Type of [address] [place] [address] Two level;Stairs to enter with rails;Bed/bath upstairs  (3 RAFAT, 4 steps inside)   Bathroom Shower/Tub Tub/shower unit   Bathroom Toilet Standard   Bathroom Equipment Other (Comment)  (denies)   [address]  (uses walker at baseline)   Additional Comments Pt lives in a bi-level home with 3 RAFAT with rails and 4 steps inside  Pt uses walker at baseline  Prior Function   Level of [place] Independent with ADLs; Independent with functional mobility; Needs assistance with IADLS   Lives With Spouse   Receives Help From Family  (son and daughter)   IADLs Independent with meal prep; Independent with medication management; Family/Friend/Other provides transportation   Falls in the last 6 months 0   Vocational Retired   Lifestyle   Autonomy Pt reports being I in ADLs, receives A with IADLs, and uses a walker at baseline  (-)      Reciprocal Relationships Pt lives with his spouse, son stops over in AM and wife is home by the afternoon  Service to Others Pt is retired  Intrinsic Gratification will cont to assess   General   Additional Pertinent History dialysis MWF   Family/Caregiver Present No   Subjective   Subjective "I feel good"   ADL   Where Assessed Chair   Eating Assistance 5  Supervision/Setup   Grooming Assistance 5  Supervision/Setup   UB Bathing Assistance 5  Supervision/Setup   LB Bathing Assistance 4  Minimal Assistance   [address] 5  Supervision/Setup   LB [place]  5  Supervision/Setup   Functional Assistance 5  Supervision/Setup   Bed Mobility   Additional Comments pt was OOB in chair upon arrival  Pt was left sitting in the chair at the end of the session with all necessary items within reach  Transfers   Sit to Stand 5  Supervision   Additional items Increased time required   Stand to Sit 5  Supervision   Additional items Increased time required   Additional Comments transfers with RW   Functional Mobility   Functional Mobility 5  Supervision   Additional Comments Pt was able to demonstrate household mobility with S and RW  Additional items Rolling walker   Balance   Static Sitting Fair +   Dynamic Sitting Fair   Static Standing Fair   Dynamic Standing Fair -   Ambulatory Fair -   Activity Tolerance   Activity Tolerance Patient tolerated treatment well   Medical Staff Made Aware PT Agustina Furnace   Nurse Made Aware RN made aware   RUE Assessment   RUE Assessment WFL   LUE Assessment   LUE Assessment WFL   Hand Function   Gross Motor Coordination Functional   Fine Motor Coordination Functional   Psychosocial   Psychosocial (WDL) WDL   Cognition   Overall Cognitive Status WFL   Arousal/Participation Responsive;Arousable; Alert; Cooperative   Attention Within functional limits   Orientation Level Oriented X4   Memory Within functional limits   Following Commands Follows one step commands with increased time or repetition Comments Pt was pleasant and cooperative t/o session  Assessment   Prognosis Fair   Assessment Pt is 61 y o  male admitted [facility]/2023 w/ chest pain  Pt  has a past medical history of Cerebrovascular accident (CVA) due to thrombosis of left middle cerebral artery ([facility] [address] ) (7/29/2018), Chronic kidney disease, Diabetes mellitus ([facility] ), GERD (gastroesophageal reflux disease), Hypercholesteremia, Hyperlipidemia, Hypertension, Infectious viral hepatitis, Neuropathy, Obesity, Osteomyelitis ([facility] ), PVC's (premature ventricular contractions), Stroke ([facility] ), and TIA (transient ischemic attack) (10/28/2018)    Pt with active OT orders and activity orders  Pt resides in a bi-level Home, with 3 RAFAT  Pt has 4 steps inside  Pt lives with spouse; pt states that his son arrives in the morning when wife leaves for work, wife is then home in afternoon  Pt was I w/ ADLs, receives A with IADLs, (-) drove  Pt is currently functioning at S for eating, grooming, and UB ADLs, min A for LB ADLs  Pt is functioning at S for transfers and functional mobility  Pt does not require further acute OT services while in the hospital  Pt does not state concerns about returning home  Based off of an OT evaulation, assessment, and performance functioning, pt is identified as a moderate complexity  The patient's raw score on the AM-PAC Daily Activity Inpatient Short Form is 22  A raw score of greater than or equal to 19 suggests the patient may benefit from discharge to home  Please refer to the recommendation of the Occupational Therapist for safe discharge planning  OT recommendation for d/c includes Home with increased social support  Pt would benefit from continued practice with ADLs and functional mobility with staff until d/c  Pt will be d/c from OT caseload     Goals   Patient Goals to go home   Recommendation   OT Discharge Recommendation No rehabilitation needs  (home with increased social support)   AM-PAC Daily Activity Inpatient   Lower Body Dressing 3   Bathing 3   Toileting 4   Upper Body Dressing 4   Grooming 4   Eating 4   Daily Activity Raw Score 22   Daily Activity Standardized Score (Calc for Raw Score >=11) 47  1   AM-PAC Applied Cognition Inpatient   Following a Speech/Presentation 3   Understanding Ordinary Conversation 4   Taking Medications 4   Remembering Where Things Are Placed or Put Away 4   Remembering List of 4-5 Errands 3   Taking Care of Complicated Tasks 3   Applied Cognition Raw Score 21   Applied Cognition Standardized Score 44 3   End of Consult   Education Provided Yes   Patient Position at End of Consult Bedside chair; All needs within reach   Nurse Communication Nurse aware of consult       Facundo Akbar OTR/L

## 2023-03-09 NOTE — ASSESSMENT & PLAN NOTE
· Patient presented with chest pain during dialysis, lasted about an hour then resolved      · No EKG changes from priors  · Troponin trend of 125/142/135  · Has history of CAD, multiple vessel on recent cardiac cath in the beginning of February of this year, s/p ADE to in stent restenosis of mid LAD   · Trend EKGs  · Monitor on telemetry  · Cardiology consulted, appreciate recommendations, likely monitor overnight and at dialysis tomorrow to ensure no recurrence of chest pain symptoms

## 2023-03-09 NOTE — ASSESSMENT & PLAN NOTE
Lab Results   Component Value Date    EGFR 7 03/09/2023    EGFR 10 03/08/2023    EGFR 12 03/06/2023    CREATININE 6 86 (H) 03/09/2023    CREATININE 5 21 (H) 03/08/2023    CREATININE 4 72 (H) 03/06/2023     · Pt with hx of ESRD on HD MWF  · Nephrology following,  · Plan for dialysis tomorrow  · Monitor hyponatremia

## 2023-03-09 NOTE — ASSESSMENT & PLAN NOTE
Lab Results   Component Value Date    HGBA1C 5 5 12/23/2022       Recent Labs     03/08/23 2048   POCGLU 118       Blood Sugar Average: Last 72 hrs:  (P) 118     · Per patient and his wife, has not been using insulin at home (or any other diabetes medications) for over a month  · Insulin sliding scale while inpatient

## 2023-03-09 NOTE — ASSESSMENT & PLAN NOTE
· Patient underwent cardiac cath on 2/1/2023 which showed multivessel CAD with marked progression since the prior cath in May 2022, including stent restenosis and occlusion   S/p ADE to mid LAD at that time   · Continue home aspirin, Effient, statin, metoprolol  · Appreciate cardiology consultation as above

## 2023-03-10 ENCOUNTER — APPOINTMENT (INPATIENT)
Dept: DIALYSIS | Facility: HOSPITAL | Age: 63
End: 2023-03-10

## 2023-03-10 VITALS
OXYGEN SATURATION: 98 % | BODY MASS INDEX: 31.4 KG/M2 | HEART RATE: 74 BPM | DIASTOLIC BLOOD PRESSURE: 52 MMHG | RESPIRATION RATE: 18 BRPM | TEMPERATURE: 98.1 F | HEIGHT: 69 IN | SYSTOLIC BLOOD PRESSURE: 113 MMHG | WEIGHT: 212 LBS

## 2023-03-10 LAB
ANION GAP SERPL CALCULATED.3IONS-SCNC: 14 MMOL/L (ref 4–13)
BUN SERPL-MCNC: 62 MG/DL (ref 5–25)
CALCIUM SERPL-MCNC: 9.2 MG/DL (ref 8.3–10.1)
CHLORIDE SERPL-SCNC: 100 MMOL/L (ref 96–108)
CO2 SERPL-SCNC: 21 MMOL/L (ref 21–32)
CREAT SERPL-MCNC: 8.37 MG/DL (ref 0.6–1.3)
ERYTHROCYTE [DISTWIDTH] IN BLOOD BY AUTOMATED COUNT: 14.7 % (ref 11.6–15.1)
GFR SERPL CREATININE-BSD FRML MDRD: 6 ML/MIN/1.73SQ M
GLUCOSE SERPL-MCNC: 102 MG/DL (ref 65–140)
GLUCOSE SERPL-MCNC: 97 MG/DL (ref 65–140)
GLUCOSE SERPL-MCNC: 98 MG/DL (ref 65–140)
HCT VFR BLD AUTO: 32.2 % (ref 36.5–49.3)
HGB BLD-MCNC: 10.3 G/DL (ref 12–17)
MCH RBC QN AUTO: 30 PG (ref 26.8–34.3)
MCHC RBC AUTO-ENTMCNC: 32 G/DL (ref 31.4–37.4)
MCV RBC AUTO: 94 FL (ref 82–98)
MRSA NOSE QL CULT: NORMAL
PLATELET # BLD AUTO: 159 THOUSANDS/UL (ref 149–390)
PMV BLD AUTO: 10.1 FL (ref 8.9–12.7)
POTASSIUM SERPL-SCNC: 4.7 MMOL/L (ref 3.5–5.3)
RBC # BLD AUTO: 3.43 MILLION/UL (ref 3.88–5.62)
SODIUM SERPL-SCNC: 135 MMOL/L (ref 135–147)
WBC # BLD AUTO: 5.84 THOUSAND/UL (ref 4.31–10.16)

## 2023-03-10 PROCEDURE — 5A1D70Z PERFORMANCE OF URINARY FILTRATION, INTERMITTENT, LESS THAN 6 HOURS PER DAY: ICD-10-PCS | Performed by: INTERNAL MEDICINE

## 2023-03-10 RX ORDER — METOPROLOL SUCCINATE 25 MG/1
25 TABLET, EXTENDED RELEASE ORAL 2 TIMES DAILY
Status: DISCONTINUED | OUTPATIENT
Start: 2023-03-10 | End: 2023-03-10 | Stop reason: HOSPADM

## 2023-03-10 RX ORDER — METOPROLOL SUCCINATE 25 MG/1
25 TABLET, EXTENDED RELEASE ORAL 2 TIMES DAILY
Qty: 60 TABLET | Refills: 0 | Status: SHIPPED | OUTPATIENT
Start: 2023-03-10 | End: 2023-03-17 | Stop reason: SDUPTHER

## 2023-03-10 RX ADMIN — PRASUGREL 5 MG: 10 TABLET, FILM COATED ORAL at 11:28

## 2023-03-10 RX ADMIN — METOPROLOL SUCCINATE 25 MG: 25 TABLET, EXTENDED RELEASE ORAL at 11:26

## 2023-03-10 RX ADMIN — HEPARIN SODIUM 5000 UNITS: 5000 INJECTION INTRAVENOUS; SUBCUTANEOUS at 05:20

## 2023-03-10 RX ADMIN — CALCIUM ACETATE 667 MG: 667 CAPSULE ORAL at 11:26

## 2023-03-10 RX ADMIN — ASPIRIN 81 MG: 81 TABLET, COATED ORAL at 11:26

## 2023-03-10 RX ADMIN — DIVALPROEX SODIUM 250 MG: 125 CAPSULE ORAL at 11:27

## 2023-03-10 RX ADMIN — CALCIUM ACETATE 667 MG: 667 CAPSULE ORAL at 07:18

## 2023-03-10 NOTE — ASSESSMENT & PLAN NOTE
· Noted on ECHO from 1/30 with EF 25-30%, moderate AS  · With LifeVest   · Follow up with cardiology outpatient

## 2023-03-10 NOTE — DISCHARGE SUMMARY
[address] [facility]  Discharge- [place] 1960, 61 y o  male MRN: [id number]  Unit/Bed#: -01 Encounter: [id number]  Primary Care Provider: Mnoy Harris DO   Date and time admitted to hospital: 3/8/2023  2:15 PM      DOS: 3/10/2023  * Chest pain  Assessment & Plan  · Patient presented with chest pain during dialysis, lasted about an hour then resolved  · No EKG changes from priors  · Troponin trend of 125/142/135  · Has history of CAD, multiple vessel on recent cardiac cath in the beginning of February of this year, s/p ADE to in stent restenosis of mid LAD with ischemic cardiomyopathy   · Cardiology following,  · Increase Toprol XL to 25 mg BID on discharge, can up titrate as needed as an outpatient vs  Addition of anti anginal medication  · Pt did not experience chest pain during dialysis today   · Continue Effient and ASA, pt a plavix non responder per review of cardiology notes   · Will need close outpatient cardiology follow up, no additional inpatient recommendations at this time     Mood disorder [facility])  Assessment & Plan  · Continue home Depakote 250 mg BID  · Mood currently stable  · Insomnia much improved on melatonin, continue prior dose at home     Ischemic cardiomyopathy  Assessment & Plan  · Noted on ECHO from 1/30 with EF 25-30%, moderate AS  · With LifeVest   · Follow up with cardiology outpatient     CAD, multiple vessel  Assessment & Plan  · Patient underwent cardiac cath on 2/1/2023 which showed multivessel CAD with marked progression since the prior cath in May 2022, including stent restenosis and occlusion   S/p ADE to mid LAD at that time   · Continue home aspirin, Effient, statin, metoprolol  · Appreciate cardiology recommendations as above   · Toprol XL increased to BID    ESRD on dialysis [facility])  Assessment & Plan  Lab Results   Component Value Date    EGFR 6 03/10/2023    EGFR 7 03/09/2023    EGFR 10 03/08/2023    CREATININE 8 37 (H) 03/10/2023 CREATININE 6 86 (H) 03/09/2023    CREATININE 5 21 (H) 03/08/2023     · Pt with hx of ESRD on HD MWF  · Nephrology following,  · S/p HD today, tolerated well without any chest pain symptoms   · Hyponatremia resolved   · Stable for discharge from renal standpoint with HD on Monday     Type 2 diabetes mellitus [facility])  Assessment & Plan  Lab Results   Component Value Date    HGBA1C 5 5 12/23/2022       Recent Labs     03/09/23  1711 03/09/23  2109 03/10/23  0616 03/10/23  1122   POCGLU 115 121 98 102       Blood Sugar Average: Last 72 hrs:  (P) 117 [id number]   · Well controlled hgbA1c  · Per patient and family, pt not maintained on insulin or PO medications at home, dietary controlled only   · Continue dietary adjustments and outpatient follow up    Medical Problems     Resolved Problems  Date Reviewed: 3/10/2023   None       Discharging Physician / Practitioner: Terry Wallace PA-C  PCP: Mercedes Joy DO  Admission Date:   Admission Orders (From admission, onward)     Ordered        03/09/23 1522  Inpatient Admission  Once            03/08/23 1630  Place in Observation  Once                      Discharge Date: 03/10/23    Consultations During Hospital Stay:  · Cardiology  · Nephrology     Procedures Performed:   · CXR 3/8 - Mild pulmonary vascular congestion  No consolidation     Significant Findings / Test Results:   · Trops 125/142/135    Incidental Findings:   · None   · N/A    Test Results Pending at Discharge (will require follow up):   · MRSA nasal swab      Outpatient Tests Requested:  · Per PCP/cardiology     Complications:  None    Reason for Admission: Chest pain     Hospital Course:   Rafaela Hassan is a 61 y o  male patient with significant past medical history of CAD s/p ADE, mood disorder, ESRD, DM who originally presented to the hospital on 3/8/2023 due to chest pain during dialysis session  Pt with significant cardiac history and therefore he was admitted and evaluated by cardiology   Low concern for ACS at this time, his BB was increased prior to discharge for aide in antianginal effect  He did not have any recurrence of his chest pain during hospitalization or with dialysis here  He was cleared for discharge from nephrology and cardiology standpoints  Pt was discharged in stable condition  For additional information please refer to medical records  Medication changes include: Increase in Toprol XL to 25 mg BID      Please see above list of diagnoses and related plan for additional information  Condition at Discharge: stable    Discharge Day Visit / Exam:   Subjective:  Pt reports that he is doing well today  States that he slept better last night with the addition of melatonin  Reports that he did not have any more chest pain at dialysis  Currently denies chest pain, shortness of breath  No other complaints at this time  Vitals: Blood Pressure: 113/52 (03/10/23 1126)  Pulse: 74 (03/10/23 1126)  Temperature: 98 1 °F (36 7 °C) (03/10/23 1126)  Temp Source: Oral (03/10/23 1045)  Respirations: 18 (03/10/23 1126)  Height: 5' 9" (175 3 cm) (03/09/23 0038)  Weight - Scale: 96 2 kg (212 lb) (03/09/23 0038)  SpO2: 98 % (03/10/23 1126)  Exam:   Physical Exam  Vitals reviewed  Constitutional:       General: He is not in acute distress  Appearance: He is not toxic-appearing  Comments: Pt is in no acute distress lying in his hospital chair resting comfortably    HENT:      Head: Normocephalic and atraumatic  Cardiovascular:      Rate and Rhythm: Normal rate and regular rhythm  Pulmonary:      Effort: No respiratory distress  Breath sounds: No wheezing  Abdominal:      General: There is no distension  Palpations: Abdomen is soft  Tenderness: There is no abdominal tenderness  Musculoskeletal:      Right lower leg: No edema  Left lower leg: No edema  Skin:     General: Skin is warm and dry  Neurological:      Mental Status: He is alert     Psychiatric:         Mood and Affect: Mood normal           Discussion with Family: Updated  (son) at bedside  Discharge instructions/Information to patient and family:   See after visit summary for information provided to patient and family  Provisions for Follow-Up Care:  See after visit summary for information related to follow-up care and any pertinent home health orders  Disposition:   Home    Planned Readmission: None     Discharge Statement:  I spent 45 minutes discharging the patient  This time was spent on the day of discharge  I had direct contact with the patient on the day of discharge  Greater than 50% of the total time was spent examining patient, answering all patient questions, arranging and discussing plan of care with patient as well as directly providing post-discharge instructions  Additional time then spent on discharge activities  Discharge Medications:  See after visit summary for reconciled discharge medications provided to patient and/or family        **Please Note: This note may have been constructed using a voice recognition system**

## 2023-03-10 NOTE — CASE MANAGEMENT
Case Management Discharge Planning Note    Patient name Ria Cast  Location /-51 MRN [id number]  : 1960 Date 3/10/2023       Current Admission Date: 3/8/2023  Current Admission Diagnosis:Chest pain   Patient Active Problem List    Diagnosis Date Noted   • Diabetic polyneuropathy associated with type 2 diabetes mellitus ([place] [address] ) 2023   • Absence of toe of right foot ([place] [address] ) 2023   • Hammer toes of both feet 2023   • Type 1 non-ST elevation myocardial infarction (NSTEMI) s/p ADE to in-stent restenosis of mid LAD 2023   • Ischemic cardiomyopathy 2023   • Moderate AS (aortic stenosis) 2023   • Mood disorder ([place] [address] ) 2023   • Pre-syncope 2023   • SOB (shortness of breath) 10/21/2022   • Left arm swelling 10/21/2022   • CAD, multiple vessel 2022   • ESRD (end stage renal disease) ([place] [address] ) 06/10/2022   • Electrolyte abnormality 2022   • Chest pain 2022   • Generalized weakness 2022   • Primary hypertension 2022   • Penetrating foot wound, left, initial encounter 2022   • Emotional lability 2022   • History of 2019 novel coronavirus disease (COVID-19) 2020   • ESRD on dialysis ([place] [address] ) 2020   • Anemia 10/05/2020   • S/P arteriovenous (AV) fistula creation 2020   • Pre-kidney transplant, listed 2020   • Urge incontinence 2019   • Overactive bladder 2019   • Anxiety associated with depression 2019   • symptomatic hypoglycemia 10/28/2018   • History of stroke 10/04/2018   • Abnormal EEG 2018   • Other constipation 2018   • GERD (gastroesophageal reflux disease) 2018   • Diabetic macular edema ([place] [address] ) 2018   • Elevated alkaline phosphatase level 2018   • Nephrotic range proteinuria 2018   • Type 2 diabetes mellitus with chronic kidney disease on chronic dialysis, with long-term current use of insulin ([facility] [address] ) 2016   • Dizziness 2016   • Mixed hyperlipidemia 02/26/2016   • Type 2 diabetes mellitus ([facility] [address] ) 01/26/2016      LOS (days): 1  Geometric Mean LOS (GMLOS) (days):   Days to GMLOS:     OBJECTIVE:  Risk of Unplanned Readmission Score: 55 67         Current admission status: Inpatient   Preferred Pharmacy:   [id number] [place] - [address] [address]  Phone: [phone] Fax: [phone]    Primary Care Provider: Rivka Hatfield DO    Primary Insurance: MEDICARE  Secondary Insurance: [place] [employer]    DISCHARGE DETAILS:             Other Referral/Resources/Interventions Provided:  Interventions: Dialysis  Referral Comments: Called [address] [phone] and spoke with Omid Fraser Nurse confirming patient has 11:15 chair time MWF and may return there monday 3/13 if dc today or over the weekend and pt needs to arrive on monday at 10:30  They request AVS/DCI upon dc to be faxed to them at [phone]  Met with pt and made aware of arrival time on monday to be 10:30  Pt verbalized understanding  will fax AVS once ready  Per [facility] provider Pascale DELANEY, pt will be dc today to home           Treatment Team Recommendation: Home  Discharge Destination Plan[de-identified] Home

## 2023-03-10 NOTE — ASSESSMENT & PLAN NOTE
· Continue home Depakote 250 mg BID  · Mood currently stable  · Insomnia much improved on melatonin, continue prior dose at home

## 2023-03-10 NOTE — PLAN OF CARE
Post-Dialysis RN Treatment Note    Blood Pressure:  Pre 146/82 mm/Hg  Post 141/79 mmHg   EDW  97 5 kg    Weight:  Pre 94 8 kg(chair scale)   Post 94 kg(chair scale)   Standing scale post weight: 94 7kg   Volume Removed  1000 ml    Treatment duration 180 minutes    NS given  No    Treatment shortened? Yes, describe: 3 hour tx per patient request, Dr Albania Kwok aware  Medications given during Rx None Reported   Estimated Kt/V  1 01   Access type: AV fistula   Access Issues: No    Report called to primary nurse   Yes A P  RN          Plan for 3 hour HD treatment per patient request with 1 kg fluid removal as tolerated per Dr Albania Kwok      Problem: METABOLIC, FLUID AND ELECTROLYTES - ADULT  Goal: Electrolytes maintained within normal limits  Description: INTERVENTIONS:  - Monitor labs and assess patient for signs and symptoms of electrolyte imbalances  - Administer electrolyte replacement as ordered  - Monitor response to electrolyte replacements, including repeat lab results as appropriate  - Instruct patient on fluid and nutrition as appropriate  Outcome: Progressing  Goal: Fluid balance maintained  Description: INTERVENTIONS:  - Monitor labs   - Monitor I/O and WT  - Instruct patient on fluid and nutrition as appropriate  - Assess for signs & symptoms of volume excess or deficit  Outcome: Progressing

## 2023-03-10 NOTE — CASE MANAGEMENT
Case Management Discharge Planning Note    Patient name Alexandrea Crawford  Location /-46 MRN [id number]  : 1960 Date 3/10/2023       Current Admission Date: 3/8/2023  Current Admission Diagnosis:Chest pain   Patient Active Problem List    Diagnosis Date Noted   • Diabetic polyneuropathy associated with type 2 diabetes mellitus ([place] [address] ) 2023   • Absence of toe of right foot ([place] [address] ) 2023   • Hammer toes of both feet 2023   • Type 1 non-ST elevation myocardial infarction (NSTEMI) s/p ADE to in-stent restenosis of mid LAD 2023   • Ischemic cardiomyopathy 2023   • Moderate AS (aortic stenosis) 2023   • Mood disorder ([place] [address] ) 2023   • Pre-syncope 2023   • SOB (shortness of breath) 10/21/2022   • Left arm swelling 10/21/2022   • CAD, multiple vessel 2022   • ESRD (end stage renal disease) ([place] [address] ) 06/10/2022   • Electrolyte abnormality 2022   • Chest pain 2022   • Generalized weakness 2022   • Primary hypertension 2022   • Penetrating foot wound, left, initial encounter 2022   • Emotional lability 2022   • History of 2019 novel coronavirus disease (COVID-19) 2020   • ESRD on dialysis ([place] [address] ) 2020   • Anemia 10/05/2020   • S/P arteriovenous (AV) fistula creation 2020   • Pre-kidney transplant, listed 2020   • Urge incontinence 2019   • Overactive bladder 2019   • Anxiety associated with depression 2019   • symptomatic hypoglycemia 10/28/2018   • History of stroke 10/04/2018   • Abnormal EEG 2018   • Other constipation 2018   • GERD (gastroesophageal reflux disease) 2018   • Diabetic macular edema ([place] [address] ) 2018   • Elevated alkaline phosphatase level 2018   • Nephrotic range proteinuria 2018   • Type 2 diabetes mellitus with chronic kidney disease on chronic dialysis, with long-term [facility] use [facility] ) 2016   • Dizziness 2016   • Mixed hyperlipidemia 02/26/2016   • Type 2 diabetes mellitus ([facility] ) 01/26/2016      LOS (days): 1  Geometric Mean LOS (GMLOS) (days):   Days to GMLOS:     OBJECTIVE:  Risk of Unplanned Readmission Score: 55 67         Current admission status: Inpatient   Preferred Pharmacy:   [id number] [place] - [address] [address]  Phone: [phone] Fax: [phone]    Primary Care Provider: Cat Davis DO    Primary Insurance: MEDICARE  Secondary Insurance: [place] [employer]    DISCHARGE DETAILS:    Discharge planning discussed with[de-identified] Patient  Freedom of Choice: Yes     CM contacted family/caregiver?: No- see comments (pt states his son is on his way)  Were Treatment Team discharge recommendations reviewed with patient/caregiver?: Yes (return to HD on monday 3/13 at 10:30)  Did patient/caregiver verbalize understanding of patient care needs?: Yes  Were patient/caregiver advised of the risks associated with not following Treatment Team discharge recommendations?: Yes                   Other Referral/Resources/Interventions Provided:  Interventions: Dialysis  Referral Comments: faxed DCI to Haily Junior at [phone] as requested

## 2023-03-10 NOTE — ASSESSMENT & PLAN NOTE
· Patient presented with chest pain during dialysis, lasted about an hour then resolved      · No EKG changes from priors  · Troponin trend of 125/142/135  · Has history of CAD, multiple vessel on recent cardiac cath in the beginning of February of this year, s/p ADE to in stent restenosis of mid LAD with ischemic cardiomyopathy   · Cardiology following,  · Increase Toprol XL to 25 mg BID on discharge, can up titrate as needed as an outpatient vs  Addition of anti anginal medication  · Pt did not experience chest pain during dialysis today   · Continue Effient and ASA, pt a plavix non responder per review of cardiology notes   · Will need close outpatient cardiology follow up, no additional inpatient recommendations at this time

## 2023-03-10 NOTE — PROGRESS NOTES
Progress Note - Cardiology   Laquita Contreras 61 y o  male MRN: [id number]  Unit/Bed#: -01 Encounter: [id number]    Assessment / Plan  This is a 61 y o  male who presents with     #Non-MI Troponin elevation  #CAD  #Ischemic cardiomyopathy- heart failure with reduced EF (initially 25-30%, recovered to 35-40% on most recent echo at [facility])  #ESRD on HD  #Type 2 DM  -I personally saw and examined the patient while he was receiving hemodialysis  He reports no recurrent chest pain while on dialysis or otherwise since admission   -He has an ischemic cardiomyopathy, with reported improvement in LV systolic function     -Presently on a suboptimal GDMT regimen consisting only of Toprol-XL due to baseline renal dysfunction and concerns that he would not be a transplant candidate if he were on full quad therapy   -I have recommended to the patient that he continue his current medication regimen and continue to monitor for symptoms at home   -Previously deemed a Plavix nonresponder due to suspected in-stent restenosis  Maintained on Effient 5 mg once daily since approximately August 2022, although its unclear to me why he is on this medication with a known history of CVA in the past and a clear expert consensus warning on the use of this medication in patients with a history of stroke due to a signal towards increased intracerebral hemorrhage in this patient population  Patient's son states that he was tried on Brilinta in the past after being deemed a Plavix nonresponder, but also found to apparently have had stent complications on Brilinta  I am unable to find documentation of this in the chart available to me       RECOMMENDATIONS:  - Continue home medication regimen consisting of dual antiplatelet therapy, aspirin and prasugrel  The patient and his son will speak to his cardiologist at [facility] regarding the safety of prasugrel for him specifically  - Increased Toprol-XL to 25 mg twice daily    Plan to uptitrate this beta-blocker to maximally tolerated doses prior to adding additional none guideline directed therapy as an antianginal    - Okay for discharge home from a cardiovascular standpoint, with close outpatient cardiology follow-up  Patient will monitor his heart rate and blood pressure twice daily at home, and was instructed on parameters that would necessitate reverting back to once daily Toprol-XL       Subjective/Objective       Subjective: Doing well  No active cardiopulmonary complaints  No recurrent chest pain on dialysis today  Objective:     Vitals: /52   Pulse 74   Temp 98 1 °F (36 7 °C)   Resp 18   Ht 5' 9" (1 753 m)   Wt 96 2 kg (212 lb)   SpO2 98%   BMI 31 31 kg/m²   Vitals:    03/09/23 0038   Weight: 96 2 kg (212 lb)     Orthostatic Blood Pressures    Flowsheet Row Most Recent Value   Blood Pressure 113/52 filed at 03/10/2023 1126   Patient Position - Orthostatic VS Lying filed at 03/10/2023 0740            Intake/Output Summary (Last 24 hours) at 3/10/2023 1144  Last data filed at 3/10/2023 1045  Gross per 24 hour   Intake 860 ml   Output 1750 ml   Net -890 ml       Invasive Devices     Peripheral Intravenous Line  Duration           Peripheral IV 03/08/23 Dorsal (posterior); Right Forearm 2 days    Peripheral IV 03/08/23 Dorsal (posterior); Right Wrist 2 days          Line  Duration           Hemodialysis AV Fistula Left Upper arm -- days              Physical Exam  General Appearance: Alert, cooperative, no distress, appears stated age  Head: Normocephalic, without obvious abnormality, atraumatic  Eyes: conjunctiva/corneas clear  Neck: No JVD  Lungs: Clear to auscultation bilaterally  Heart: RRR, S1 and S2 normal, no murmur, rub or gallop  Left precordial and shoulder tenderness to palpation  Abdomen: Soft, non-tender, bowel sounds active  Extremities: no edema  Skin: Warm and dry     Lab Results: I have personally reviewed pertinent lab results      Imaging: I have personally reviewed pertinent reports          ECG:  Sinus rhythm, RBBB, LVH         Benitez Xavier MD

## 2023-03-10 NOTE — ASSESSMENT & PLAN NOTE
Lab Results   Component Value Date    HGBA1C 5 5 12/23/2022       Recent Labs     03/09/23  1711 03/09/23  2109 03/10/23  0616 03/10/23  1122   POCGLU 115 121 98 102       Blood Sugar Average: Last 72 hrs:  (P) 117 [id number]   · Well controlled hgbA1c  · Per patient and family, pt not maintained on insulin or PO medications at home, dietary controlled only   · Continue dietary adjustments and outpatient follow up

## 2023-03-10 NOTE — ASSESSMENT & PLAN NOTE
Lab Results   Component Value Date    EGFR 6 03/10/2023    EGFR 7 03/09/2023    EGFR 10 03/08/2023    CREATININE 8 37 (H) 03/10/2023    CREATININE 6 86 (H) 03/09/2023    CREATININE 5 21 (H) 03/08/2023     · Pt with hx of ESRD on HD MWF  · Nephrology following,  · S/p HD today, tolerated well without any chest pain symptoms   · Hyponatremia resolved   · Stable for discharge from renal standpoint with HD on Monday

## 2023-03-10 NOTE — PROGRESS NOTES
[address] [facility] NOTE   Rafaela Hassan 61 y o  male MRN: [id number]  Unit/Bed#: -01 Encounter: [id number]  Reason for Consult: ESRD on HD    ASSESSMENT and PLAN:  1  ESRD on HD (Jesus Bourgeois, PANDA):  · HD today  2  Access: L arm AVF       3  Hypertension:  · BP at goal    · EDW is 97 5 kg but was 94 8 kg on weight today  · Suspect that weights are not accurate  · Clinically euvolemic  · Plan for 1 kg UF today  · Meds: Metoprolol succinate 25 mg OD  · Increase Metoprolol per cards?      4  CAD:  · s/p cardiac cath on 2/1/23 with ADE to LAD in stent restenosis  · On effient  · Defer to cardiology  · Restart Imdur? 5  ICM:  · Appears compensated  · Echo 1/20/23 - EF 25 to 30%  · 1 kg UF on HD today       6  Anemia:  · Hgb at goal    · He is Mircera as an outpatient  · No BAYLEE as inpatient       7  Mineral and bone disease:  · Continue Phoslo for hyperphos  · Low phos diet       DISPOSITION:  · HD today  · 1 kg UF on HD  · May be discharged from renal standpoint once cleared by cards  · Increase Metoprolol vs adding Imdur? Defer to cards  SUBJECTIVE / 24H INTERVAL HISTORY:  No CP or SOB  No new acute issues  HEMODIALYSIS PROCEDURE NOTE  The patient was seen and examined on hemodialysis  Time: 3 hours  Sodium: 138 Blood flow: 450   Dialyzer: F160 Potassium: 2 Dialysate flow: 500   Access: L arm AVF Bicarbonate: 35 Ultrafiltration goal: 1 kg   Medications on HD: None        OBJECTIVE:  Current Weight: Weight - Scale: 96 2 kg (212 lb)  Vitals:    03/10/23 0800 03/10/23 0830 03/10/23 0900 03/10/23 0930   BP: 141/85 139/75 135/66 136/69   BP Location:       Pulse: 74 74 72 72   Resp: 19 18 16 18   Temp:       TempSrc:       SpO2:       Weight:       Height:           Intake/Output Summary (Last 24 hours) at 3/10/2023 1000  Last data filed at 3/10/2023 0930  Gross per 24 hour   Intake 560 ml   Output 250 ml   Net 310 ml     General: conscious, cooperative, no distress  Skin: dry  Eyes: pink conjunctivae  ENT: moist mucous membranes  Respiratory: equal chest expansion, clear breath sounds  Cardiovascular: distinct heart sounds, normal rate, regular rhythm, no rub  Abdomen: soft, non tender, non distended, normal bowel sounds  Extremities: no edema  Genitourinary: no chávez catheter  Neuro: awake, alert     Psych: anxious    Medications:    Current Facility-Administered Medications:   •  acetaminophen (TYLENOL) tablet 650 mg, 650 mg, Oral, Q4H PRN, Emery Ahumada, MD  •  aspirin (ECOTRIN LOW STRENGTH) EC tablet 81 mg, 81 mg, Oral, Daily, Emery Ahumada, MD, 81 mg at 03/09/23 0042  •  atorvastatin (LIPITOR) tablet 40 mg, 40 mg, Oral, Daily With Hakeem Rollins MD, 40 mg at 03/09/23 1739  •  calcium acetate (PHOSLO) capsule 667 mg, 667 mg, Oral, TID With Meals, Emery Ahumada, MD, 667 mg at 03/10/23 3859  •  divalproex sodium (DEPAKOTE SPRINKLE) capsule 250 mg, 250 mg, Oral, Q12H [address], Emery Ahumada, MD, 250 mg at 03/09/23 2152  •  heparin (porcine) subcutaneous injection 5,000 Units, 5,000 Units, Subcutaneous, Q8H [address], Emery Ahumada, MD, 5,000 Units at 03/10/23 0520  •  insulin lispro (HumaLOG) 100 units/mL subcutaneous injection 1-5 Units, 1-5 Units, Subcutaneous, TID AC, 1 Units at 03/09/23 1211 **AND** Fingerstick Glucose (POCT), , , TID AC, Emery Ahumada, MD  •  insulin lispro (HumaLOG) 100 units/mL subcutaneous injection 1-5 Units, 1-5 Units, Subcutaneous, HS, Emery Ahumada, MD  •  melatonin tablet 9 mg, 9 mg, Oral, HS, Sergio Foss PA-C, 9 mg at 03/09/23 2152  •  metoprolol succinate (TOPROL-XL) 24 hr tablet 25 mg, 25 mg, Oral, Daily, Emery Ahumada, MD, 25 mg at 03/09/23 3766  •  polyethylene glycol (MIRALAX) packet 17 g, 17 g, Oral, Daily PRN, Emery Ahumada, MD  •  prasugrel (EFFIENT) tablet 5 mg, 5 mg, Oral, Daily, Emery Ahumada, MD, 5 mg at 03/09/23 8286  •  Insert peripheral IV, , , Once **AND** sodium chloride (PF) 0 9 % injection 3 mL, 3 mL, Intravenous, Q1H PRN, Noelle Harp MD    Laboratory Results:  Results from last 7 days   Lab Units 03/10/23  0515 03/09/23  0541 03/08/23  1455 03/06/23  2028   WBC Thousand/uL 5 84 5 30 4 58 8 08   HEMOGLOBIN g/dL 10 3* 10 2* 10 8* 10 6*   HEMATOCRIT % 32 2* 32 8* 34 9* 33 0*   PLATELETS Thousands/uL 159 142* 138* 138*   POTASSIUM mmol/L 4 7 3 9 4 3 4 2   CHLORIDE mmol/L 100 99 97 94*   CO2 mmol/L 21 27 31 32   BUN mg/dL 62* 48* 39* 28*   CREATININE mg/dL 8 37* 6 86* 5 21* 4 72*   CALCIUM mg/dL 9 2 9 4 9 6 9 7   MAGNESIUM mg/dL  --  2 6  --   --

## 2023-03-10 NOTE — DISCHARGE INSTR - AVS FIRST PAGE
Please follow up with your primary care provider within one week   Please follow up with cardiology and nephrology   Increase metoprolol to 25 mg twice a day   If you begin to have any worsening chest pain, shortness of breath, lightheadedness please come back to the hospital for further evaluation

## 2023-03-10 NOTE — ASSESSMENT & PLAN NOTE
· Patient underwent cardiac cath on 2/1/2023 which showed multivessel CAD with marked progression since the prior cath in May 2022, including stent restenosis and occlusion   S/p ADE to mid LAD at that time   · Continue home aspirin, Effient, statin, metoprolol  · Appreciate cardiology recommendations as above   · Toprol XL increased to BID

## 2023-03-10 NOTE — QUICK NOTE
I entered Pt's room to due 11:00 vitals and he told me to leave him and refused vitals  Therefore vitals are not done  RN and PCA's  are notified

## 2023-03-11 ENCOUNTER — HOSPITAL ENCOUNTER (EMERGENCY)
Facility: HOSPITAL | Age: 63
Discharge: HOME | End: 2023-03-11
Attending: EMERGENCY MEDICINE

## 2023-03-11 ENCOUNTER — APPOINTMENT (EMERGENCY)
Dept: RADIOLOGY | Facility: HOSPITAL | Age: 63
End: 2023-03-11

## 2023-03-11 VITALS
OXYGEN SATURATION: 97 % | DIASTOLIC BLOOD PRESSURE: 76 MMHG | RESPIRATION RATE: 16 BRPM | HEART RATE: 72 BPM | SYSTOLIC BLOOD PRESSURE: 161 MMHG | TEMPERATURE: 97.1 F

## 2023-03-11 DIAGNOSIS — M79.671 RIGHT FOOT PAIN: Primary | ICD-10-CM

## 2023-03-11 DIAGNOSIS — E55.9 VITAMIN D DEFICIENCY: ICD-10-CM

## 2023-03-11 RX ORDER — OXYCODONE HYDROCHLORIDE 5 MG/1
2.5 TABLET ORAL EVERY 6 HOURS PRN
Qty: 10 TABLET | Refills: 0 | Status: SHIPPED | OUTPATIENT
Start: 2023-03-11 | End: 2023-03-21

## 2023-03-11 RX ORDER — OXYCODONE HYDROCHLORIDE 5 MG/1
2.5 TABLET ORAL ONCE
Status: COMPLETED | OUTPATIENT
Start: 2023-03-11 | End: 2023-03-11

## 2023-03-11 RX ADMIN — OXYCODONE HYDROCHLORIDE 2.5 MG: 5 TABLET ORAL at 15:36

## 2023-03-11 RX ADMIN — DICLOFENAC SODIUM TOPICAL GEL, 1%, 2 G: 10 GEL TOPICAL at 16:45

## 2023-03-11 NOTE — ED ATTENDING ATTESTATION
3/11/2023  Ric CUNHA DO, saw and evaluated the patient  I have discussed the patient with the resident/non-physician practitioner and agree with the resident's/non-physician practitioner's findings, Plan of Care, and MDM as documented in the resident's/non-physician practitioner's note, except where noted  All available labs and Radiology studies were reviewed  I was present for key portions of any procedure(s) performed by the resident/non-physician practitioner and I was immediately available to provide assistance  At this point I agree with the current assessment done in the Emergency Department  I have conducted an independent evaluation of this patient a history and physical is as follows:    61 yom with right foot pain   pmhx esrd, dialysis, DM, neuropathy  Redness around heel  Pain  NVI    No trauma  Recent admission for chest pain    Plan xr r/o fracture  Diff dx: gout, doubt infection    ED Course         Critical Care Time  Procedures

## 2023-03-11 NOTE — ED PROVIDER NOTES
History  Chief Complaint   Patient presents with   • Foot Pain     Right foot pain     HPI     60-year-old male with past medical history of ESRD on HD M/W/F, hyperlipidemia, hypertension, cardiomyopathy with LifeVest, and diabetes who presents for evaluation of right foot pain  Patient states foot pain started yesterday  He denies any trauma or injuries to the foot  Pain is in the right heel  He states pain is worse with walking and better at rest   He has not been taking anything at home for pain  He states he has never had pain like this in the past   He does have neuropathy but still has some sensation in his feet  Denies fevers or chills  Denies chest pain, shortness of breath, abdominal pain, nausea, or vomiting  Patient is on hemodialysis and received dialysis yesterday  Prior to Admission Medications   Prescriptions Last Dose Informant Patient Reported? Taking? Blood Glucose Monitoring Suppl (True Metrix Meter) w/Device KIT   No No   Sig: Use to test blood sugars 3 times daily   Blood Pressure Monitoring (BLOOD PRESSURE CUFF) MISC   No No   Sig: Use to check blood pressure before taking blood pressure medication and 1 hour after and follow instructions provided in discharge instructions based on the readings     Cholecalciferol (Vitamin D3) 1 25 MG (16083 UT) CAPS   No No   Sig: TAKE 1 CAPSULE BY MOUTH ONE TIME PER WEEK   Insulin Pen Needle (BD Pen Needle Adina U/F) 32G X 4 MM MISC   No No   Sig: Use 4 times daily   Insulin Syringe-Needle U-100 (B-D INS SYRINGE 0 5CC/30GX1/2") 30G X 1/2" 0 5 ML MISC   No No   Sig: Inject once daily   Lancets Ultra Thin 30G MISC   No No   Sig: Use 3 times a day   ONETOUCH DELICA LANCETS 97Z MISC   No No   Sig: by Does not apply route 3 (three) times a day   True Metrix Blood Glucose Test test strip   No No   Sig: USE 1 EACH 3 (THREE) TIMES A DAY USE AS INSTRUCTED   aspirin (ECOTRIN LOW STRENGTH) 81 mg EC tablet   Yes No   Sig: Take 81 mg by mouth daily Resume on 8/14     atorvastatin (LIPITOR) 40 mg tablet   No No   Sig: TAKE 1 TABLET BY MOUTH EVERY DAY WITH DINNER   calcium acetate (CALPHRON) 667 mg   Yes No   Sig: 3 tablets 3x per day   divalproex sodium (DEPAKOTE SPRINKLE) 125 MG capsule   No No   Sig: TAKE 2 CAPSULES (250 MG TOTAL) BY MOUTH EVERY 12 (TWELVE) HOURS   metoprolol succinate (TOPROL-XL) 25 mg 24 hr tablet   No No   Sig: Take 1 tablet (25 mg total) by mouth 2 (two) times a day   prasugrel (EFFIENT) tablet   No No   Sig: Take 1 tablet (5 mg total) by mouth daily      Facility-Administered Medications: None       Past Medical History:   Diagnosis Date   • Cerebrovascular accident (CVA) due to thrombosis of left middle cerebral artery [facility] ) 7/29/2018   • Chronic kidney disease    • Diabetes mellitus (HCC)    • GERD (gastroesophageal reflux disease)    • Hypercholesteremia    • Hyperlipidemia    • Hypertension    • 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   • Neuropathy    • Obesity    • Osteomyelitis ([facility] )     last assessed 11/4/16   • PVC's (premature ventricular contractions)     sees cardiology Dr Jane camargo   • Stroke [facility])     last weeof July 2018 [facility] [address]   • TIA (transient ischemic attack) 10/28/2018       Past Surgical History:   Procedure Laterality Date   • ABDOMINAL SURGERY     • CARDIAC CATHETERIZATION N/A 5/2/2022    Procedure: Cardiac Coronary Angiogram;  Surgeon: Luz Henning MD;  [facility]; Service: Cardiology   • CARDIAC CATHETERIZATION N/A 5/2/2022    Procedure: Cardiac pci;  Surgeon: Luz Henning MD;  [facility]; Service: Cardiology   • CARDIAC CATHETERIZATION  2/1/2023    Procedure: Cardiac catheterization;  Surgeon: Luz Henning MD;  [facility]; Service: Cardiology   • CARDIAC CATHETERIZATION N/A 2/1/2023    Procedure: Cardiac pci;  Surgeon: Luz Henning MD;  [facility];   Service: Cardiology   • CARDIAC CATHETERIZATION N/A 2/1/2023 Procedure: Cardiac Coronary Angiogram;  Surgeon: Chas Colin MD;  [facility]: [facility]; Service: Cardiology   • CARDIAC CATHETERIZATION N/A 2/1/2023    Procedure: Cardiac other-IVUS;  Surgeon: Chas Colin MD;  [facility]: [facility]; Service: Cardiology   • CHOLECYSTECTOMY      Percutaneous   • COLONOSCOPY     • CYSTOSCOPY     • OTHER SURGICAL HISTORY      "stimulator to control bowel movements"   • GA ESOPHAGOGASTRODUODENOSCOPY TRANSORAL DIAGNOSTIC N/A 9/27/2016    Procedure: ESOPHAGOGASTRODUODENOSCOPY (EGD); Surgeon: Victorina Mrorison MD;  Location: [facility] GI LAB; Service: Gastroenterology   • GA LAPAROSCOPY SURG CHOLECYSTECTOMY N/A 2/29/2016    Procedure: LAPAROSCOPIC CHOLECYSTECTOMY ;  Surgeon: Michelle Gerard DO;  Location: [facility] Main OR;  Service: General   • ROTATOR CUFF REPAIR Right    • TOE AMPUTATION Right 10/28/2016    Procedure: 3RD TOE AMPUTATION ;  Surgeon: Rory Feliciano DPM;  Location: [facility];  Service:        Family History   Problem Relation Age of Onset   • Leukemia Mother    • Liver disease Mother    • Lung cancer Mother         heavy smoker - 3 ppd   • Heart disease Father    • Liver disease Father    • Multiple myeloma Sister    • Breast cancer Sister    • Urolithiasis Family    • Alcohol abuse Neg Hx    • Depression Neg Hx    • Drug abuse Neg Hx    • Substance Abuse Neg Hx    • Mental illness Neg Hx      I have reviewed and agree with the history as documented  E-Cigarette/Vaping   • E-Cigarette Use Never User      E-Cigarette/Vaping Substances   • Nicotine No    • THC No    • CBD No    • Flavoring No    • Other No    • Unknown No      Social History     Tobacco Use   • Smoking status: Never   • Smokeless tobacco: Never   Vaping Use   • Vaping Use: Never used   Substance Use Topics   • Alcohol use: Not Currently   • Drug use: No        Review of Systems   Constitutional: Negative for appetite change, chills and fever     HENT: Negative for congestion, rhinorrhea and sore throat  Respiratory: Negative for cough and shortness of breath  Cardiovascular: Negative for chest pain  Gastrointestinal: Negative for abdominal pain, diarrhea, nausea and vomiting  Musculoskeletal: Positive for arthralgias  Negative for myalgias  Skin: Negative for rash  Neurological: Negative for dizziness, weakness, light-headedness, numbness and headaches  All other systems reviewed and are negative  Physical Exam  ED Triage Vitals [03/11/23 1323]   Temperature Pulse Respirations Blood Pressure SpO2   (!) 97 1 °F (36 2 °C) 72 16 161/76 97 %      Temp src Heart Rate Source Patient Position - Orthostatic VS BP Location FiO2 (%)   -- -- -- -- --      Pain Score       5             Orthostatic Vital Signs  Vitals:    03/11/23 1323   BP: 161/76   Pulse: 72       Physical Exam  Vitals and nursing note reviewed  Constitutional:       General: He is not in acute distress  Appearance: Normal appearance  He is well-developed  He is obese  He is not ill-appearing, toxic-appearing or diaphoretic  HENT:      Head: Normocephalic and atraumatic  Right Ear: External ear normal       Left Ear: External ear normal       Nose: Nose normal       Mouth/Throat:      Mouth: Mucous membranes are moist       Pharynx: Oropharynx is clear  Eyes:      Extraocular Movements: Extraocular movements intact  Conjunctiva/sclera: Conjunctivae normal    Cardiovascular:      Rate and Rhythm: Normal rate and regular rhythm  Pulses: Normal pulses  Heart sounds: Normal heart sounds  No murmur heard  No friction rub  No gallop  Pulmonary:      Effort: Pulmonary effort is normal  No respiratory distress  Breath sounds: Normal breath sounds  No wheezing or rales  Abdominal:      General: There is no distension  Palpations: Abdomen is soft  Tenderness: There is no abdominal tenderness  There is no guarding or rebound  Musculoskeletal:         General: Tenderness present  Cervical back: Neck supple  Comments: Tenderness and mild swelling to the right heel  Skin:     General: Skin is warm and dry  Coloration: Skin is not pale  Findings: No rash  Comments: Mild erythema and warmth of the right heel, no open wounds  Neurological:      General: No focal deficit present  Mental Status: He is alert and oriented to person, place, and time  Cranial Nerves: No cranial nerve deficit  Sensory: No sensory deficit  Motor: No weakness  Psychiatric:         Mood and Affect: Mood normal          Behavior: Behavior normal          ED Medications  Medications   oxyCODONE (ROXICODONE) IR tablet 2 5 mg (2 5 mg Oral Given 3/11/23 1536)       Diagnostic Studies  Results Reviewed     None                 XR foot 3+ views RIGHT   Final Result by Valerie Baltazar MD (03/11 1954)      No acute osseous abnormality  Workstation performed: [id number]               Procedures  Procedures      ED Course                             SBIRT 20yo+    Flowsheet Row Most Recent Value   SBIRT (25 yo +)    In order to provide better care to our patients, we are screening all of our patients for alcohol and drug use  Would it be okay to ask you these screening questions? Yes Filed at: 03/11/2023 135   Initial Alcohol Screen: US AUDIT-C     1  How often do you have a drink containing alcohol? 0 Filed at: 03/11/2023 1351   2  How many drinks containing alcohol do you have on a typical day you are drinking? 0 Filed at: 03/11/2023 1356   3a  Male UNDER 65: How often do you have five or more drinks on one occasion? 0 Filed at: 03/11/2023 1356   3b  FEMALE Any Age, or MALE 65+: How often do you have 4 or more drinks on one occassion? 0 Filed at: 03/11/2023 1356   Audit-C Score 0 Filed at: 03/11/2023 1356   XIN: How many times in the past year have you    Used an illegal drug or used a prescription medication for non-medical reasons?  Never Filed at: 03/11/2023 1355 MDM     60-year-old male with past medical history of ESRD on HD M/W/F, hyperlipidemia, hypertension, myopathy with LifeVest, and diabetes who presents for evaluation of right foot pain for two days, no trauma or open wounds on the foot  Tenderness and mild swelling and erythema of the right heel  No fevers  Will obtain xray to evaluate for fracture or bony abnormality  Do not feel that patient would need labs at this time  Reviewed and interpreted foot xray, no fracture  Will treat with Voltaren gel and oxycodone for possible gout  Does not appear to be infection at this time  Will have patient follow up with podiatry  Discussed with patient strict return precautions  Patient expressed understanding and was agreeable for discharge  Disposition  Final diagnoses:   Right foot pain     Time reflects when diagnosis was documented in both MDM as applicable and the Disposition within this note     Time User Action Codes Description Comment    3/11/2023  3:28 PM Luis Davis Add [A34 [id number]] Right foot pain       ED Disposition     ED Disposition   Discharge    Condition   Stable    Date/Time   Sat Mar 11, 2023  3:27 PM    Ryder discharge to home/self care                 Follow-up Information     Follow up With Specialties Details Why Contact Info Additional Information    [facility] Call in 2 days  [address] [id number]  [address] [facility], [address] [place] [address] [facility]          Discharge Medication List as of 3/11/2023  4:09 PM      START taking these medications    Details   oxyCODONE (Roxicodone) 5 immediate release tablet Take 0 5 tablets (2 5 mg total) by mouth every 6 (six) hours as needed for moderate pain for up to 10 days Max Daily Amount: 10 mg, Starting Sat 3/11/2023, Until Tue 3/21/2023 at 2359, Normal         CONTINUE these medications which have NOT CHANGED Details   aspirin (ECOTRIN LOW STRENGTH) 81 mg EC tablet Take 81 mg by mouth daily Resume on 8/14  , Historical Med      atorvastatin (LIPITOR) 40 mg tablet TAKE 1 TABLET BY MOUTH EVERY DAY WITH DINNER, Normal      Blood Glucose Monitoring Suppl (True Metrix Meter) w/Device KIT Use to test blood sugars 3 times daily, Normal      Blood Pressure Monitoring (BLOOD PRESSURE CUFF) MISC Use to check blood pressure before taking blood pressure medication and 1 hour after and follow instructions provided in discharge instructions based on the readings  , Print      calcium acetate (CALPHRON) 667 mg 3 tablets 3x per day, Historical Med      Cholecalciferol (Vitamin D3) 1 25 MG (71453 UT) CAPS TAKE 1 CAPSULE BY MOUTH ONE TIME PER WEEK, Normal      divalproex sodium (DEPAKOTE SPRINKLE) 125 MG capsule TAKE 2 CAPSULES (250 MG TOTAL) BY MOUTH EVERY 12 (TWELVE) HOURS, Starting Thu 2/16/2023, Normal      Insulin Pen Needle (BD Pen Needle Adina U/F) 32G X 4 MM MISC Use 4 times daily, Normal      Insulin Syringe-Needle U-100 (B-D INS SYRINGE 0 5CC/30GX1/2") 30G X 1/2" 0 5 ML MISC Inject once daily, Normal      !! Lancets Ultra Thin 30G MISC Use 3 times a day, Normal      metoprolol succinate (TOPROL-XL) 25 mg 24 hr tablet Take 1 tablet (25 mg total) by mouth 2 (two) times a day, Starting Fri 3/10/2023, Normal      !! ONETOUCH DELICA LANCETS 74F MISC by Does not apply route 3 (three) times a day, Starting Tue 11/27/2018, Normal      prasugrel (EFFIENT) tablet Take 1 tablet (5 mg total) by mouth daily, Starting Tue 2/14/2023, Normal      True Metrix Blood Glucose Test test strip USE 1 EACH 3 (THREE) TIMES A DAY USE AS INSTRUCTED, Starting Tue 2/7/2023, Normal       !! - Potential duplicate medications found  Please discuss with provider  No discharge procedures on file      PDMP Review       Value Time User    PDMP Reviewed  Yes 11/12/2022  6:19 AM Marco A Marcus MD           ED Provider  Attending physically available and gutierrez Vallecillo I managed the patient along with the ED Attending      Electronically Signed by         Elli Bowie MD  03/14/23 5246

## 2023-03-11 NOTE — DISCHARGE INSTRUCTIONS
Please call podiatry to make a follow up appointment next week  Please use voltaren gel up to 4x per day as needed for pain  You may take oxycodone 2 5 mg every 6 hours as needed for pain  Return to the ER if you develop worsening redness in the foot or fevers

## 2023-03-13 ENCOUNTER — OFFICE VISIT (OUTPATIENT)
Dept: PODIATRY | Facility: CLINIC | Age: 63
End: 2023-03-13

## 2023-03-13 ENCOUNTER — TRANSITIONAL CARE MANAGEMENT (OUTPATIENT)
Dept: INTERNAL MEDICINE CLINIC | Facility: CLINIC | Age: 63
End: 2023-03-13

## 2023-03-13 ENCOUNTER — VBI (OUTPATIENT)
Dept: FAMILY MEDICINE CLINIC | Facility: CLINIC | Age: 63
End: 2023-03-13

## 2023-03-13 VITALS
WEIGHT: 217.8 LBS | SYSTOLIC BLOOD PRESSURE: 123 MMHG | HEIGHT: 69 IN | HEART RATE: 80 BPM | BODY MASS INDEX: 32.26 KG/M2 | OXYGEN SATURATION: 97 % | DIASTOLIC BLOOD PRESSURE: 77 MMHG

## 2023-03-13 DIAGNOSIS — L03.115 CELLULITIS OF RIGHT HEEL: Primary | ICD-10-CM

## 2023-03-13 RX ORDER — CEPHALEXIN 500 MG/1
500 CAPSULE ORAL EVERY 12 HOURS SCHEDULED
Qty: 14 CAPSULE | Refills: 1 | Status: SHIPPED | OUTPATIENT
Start: 2023-03-13 | End: 2023-03-20

## 2023-03-13 NOTE — TELEPHONE ENCOUNTER
Yared Joaquin    ED Visit Information     Ed visit date: 3-11-23  Diagnosis Description: Right foot pain  In Network? Yes [facility]  Discharge status: Home  Discharged with meds ? Yes  Number of ED visits to date: 5  ED Severity:N/A     Outreach Information    Outreach successful: Yes 1  Date letter mailed:0  Date Finalized:03-13-23    Care Coordination    Follow up appointment with pcp: no PCP f/u  Transportation issues ? No    Value [employer] type: 7 Day Outreach  Emergent necessity warranted by diagnosis: No  [facility] PCP: Yes  Transportation: Friend/Family Transport  Called PCP first?: No  Feels able to call PCP for urgent problems ?: Yes  Understands what emergencies can be handled by PCP ?: Yes  Ever any problems getting appointment with PCP for minor emergency/urgency problems?: No  Practice Contacted Patient ?: No  Pt had ED follow up with pcp/staff ?: No    Seen for follow-up out of network ?: No  Reason Patient went to ED instead of Urgent Care or PCP?: Perceived Severity of Illness  Urgent care Education?: No  03/13/2023 10:44 AM EDT by Milton Bedoya 03/13/2023 10:44 AM EDT by Ashia Schafer (Self) [phone] (Mobile)    Personal communication with patients wife regarding recent ED visit on 3-11-23  Patients wife stated that patient is better   Patient declined PCP follow-up

## 2023-03-13 NOTE — PROGRESS NOTES
Assessment/Plan:     The patient's clinical examination today is concerning for a localized cellulitis of the right heel  I will start him on a course of cephalexin 500 mg twice daily (recommended renal dosing on HD) for 7 days  There are no open wounds to the right foot  He is currently wearing his diabetic shoes and tolerating them well  Notes from the emergency room visit on March 11, 2023 were reviewed and appreciated  His CBC from March 10, 2023 was negative for leukocytosis  His BMP from March 10, 2023 showed an elevated BUN and creatinine  Recommend follow-up in 1 week  Patient was instructed to call my office if symptoms should worsen or if he should develop fevers or chills  Diagnoses and all orders for this visit:    Cellulitis of right heel  -     cephalexin (KEFLEX) 500 mg capsule; Take 1 capsule (500 mg total) by mouth every 12 (twelve) hours for 7 days          Subjective:     Patient ID: Zahira Troncoso is a 61 y o  male  The patient presents today with a chief complaint of a new onset area of redness and tenderness to his posteromedial right heel  The patient cannot recall any injury or trauma to the right foot  He was seen in the local emergency room over the weekend for the same issue and was diagnosed with gout and prescribed diclofenac 1% gel to be applied to the area as needed for pain  Thus far, the topical gel therapy has not helped  He denies any fevers or chills  He was just seen by Dr Kelsie Back last week for at risk foot care  At that time, the patient stated that the right heel is fine        PAST MEDICAL HISTORY:  Past Medical History:   Diagnosis Date   • Cerebrovascular accident (CVA) due [facility] thrombosis of [facility] 7/29/2018   • Chronic kidney disease    • Diabetes mellitus ([facility] )    • GERD (gastroesophageal reflux disease)    • Hypercholesteremia    • Hyperlipidemia    • Hypertension    • 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   • Neuropathy • Obesity    • Osteomyelitis ([facility] )     last assessed 11/4/16   • PVC's (premature ventricular contractions)     sees cardiology Dr Shweta camargo   • Stroke [facility])     last weeof July 2018 [address] [place] [address]   • TIA (transient ischemic attack) 10/28/2018       PAST SURGICAL HISTORY:  Past Surgical History:   Procedure Laterality Date   • ABDOMINAL SURGERY     • CARDIAC CATHETERIZATION N/A 5/2/2022    Procedure: Cardiac Coronary Angiogram;  Surgeon: Zhane Ybarra MD;  [facility]; Service: Cardiology   • CARDIAC CATHETERIZATION N/A 5/2/2022    Procedure: Cardiac pci;  Surgeon: Zhane Ybarra MD;  [facility]; Service: Cardiology   • CARDIAC CATHETERIZATION  2/1/2023    Procedure: Cardiac catheterization;  Surgeon: Zhane Ybarra MD;  [facility]; Service: Cardiology   • CARDIAC CATHETERIZATION N/A 2/1/2023    Procedure: Cardiac pci;  Surgeon: Zhane Ybarra MD;  [facility]; Service: Cardiology   • CARDIAC CATHETERIZATION N/A 2/1/2023    Procedure: Cardiac Coronary Angiogram;  Surgeon: Zhane Ybarra MD;  [facility]; Service: Cardiology   • CARDIAC CATHETERIZATION N/A 2/1/2023    Procedure: Cardiac other-IVUS;  Surgeon: Zhane Ybarra MD;  [facility]; Service: Cardiology   • CHOLECYSTECTOMY      Percutaneous   • COLONOSCOPY     • CYSTOSCOPY     • OTHER SURGICAL HISTORY      "stimulator to control bowel movements"   • LA ESOPHAGOGASTRODUODENOSCOPY TRANSORAL DIAGNOSTIC N/A 9/27/2016    Procedure: ESOPHAGOGASTRODUODENOSCOPY (EGD); Surgeon: Tito Hernandes MD;  Location: [facility] GI LAB;   Service: Gastroenterology   • LA LAPAROSCOPY SURG CHOLECYSTECTOMY N/A 2/29/2016    Procedure: LAPAROSCOPIC CHOLECYSTECTOMY ;  Surgeon: Nikita Grullon DO;  Location: [facility] Main OR;  Service: General   • ROTATOR CUFF REPAIR Right    • TOE AMPUTATION Right 10/28/2016    Procedure: 3RD TOE AMPUTATION ;  Surgeon: Kylie Yu CORINA;  Location: [facility];  Service:         ALLERGIES:  Patient has no known allergies  MEDICATIONS:  Current Outpatient Medications   Medication Sig Dispense Refill   • aspirin (ECOTRIN LOW STRENGTH) 81 mg EC tablet Take 81 mg by mouth daily Resume on 8/14       • atorvastatin (LIPITOR) 40 mg tablet TAKE 1 TABLET BY MOUTH EVERY DAY WITH DINNER 90 tablet 1   • Blood Glucose Monitoring Suppl (True Metrix Meter) w/Device KIT Use to test blood sugars 3 times daily 1 kit 0   • Blood Pressure Monitoring (BLOOD PRESSURE CUFF) MISC Use to check blood pressure before taking blood pressure medication and 1 hour after and follow instructions provided in discharge instructions based on the readings   1 each 0   • calcium acetate (CALPHRON) 667 mg 3 tablets 3x per day     • cephalexin (KEFLEX) 500 mg capsule Take 1 capsule (500 mg total) by mouth every 12 (twelve) hours for 7 days 14 capsule 1   • Cholecalciferol (Vitamin D3) 1 25 MG (62237 UT) CAPS TAKE 1 CAPSULE BY MOUTH ONE TIME PER WEEK 12 capsule 2   • divalproex sodium (DEPAKOTE SPRINKLE) 125 MG capsule TAKE 2 CAPSULES (250 MG TOTAL) BY MOUTH EVERY 12 (TWELVE) HOURS 360 capsule 1   • Insulin Pen Needle (BD Pen Needle Adina U/F) 32G X 4 MM MISC Use 4 times daily 400 each 1   • Insulin Syringe-Needle U-100 (B-D INS SYRINGE 0 5CC/30GX1/2") 30G X 1/2" 0 5 ML MISC Inject once daily 100 each 1   • metoprolol succinate (TOPROL-XL) 25 mg 24 hr tablet Take 1 tablet (25 mg total) by mouth 2 (two) times a day 60 tablet 0   • ONETOUCH DELICA LANCETS 67K MISC by Does not apply route 3 (three) times a day 270 each 1   • oxyCODONE (Roxicodone) 5 immediate release tablet Take 0 5 tablets (2 5 mg total) by mouth every 6 (six) hours as needed for moderate pain for up to 10 days Max Daily Amount: 10 mg 10 tablet 0   • prasugrel (EFFIENT) tablet Take 1 tablet (5 mg total) by mouth daily 90 tablet 3   • True Metrix Blood Glucose Test test strip USE 1 EACH 3 (THREE) TIMES A DAY USE AS INSTRUCTED 300 strip 1   • Lancets Ultra Thin 30G MISC Use 3 times a day 300 each 0     No current facility-administered medications for this visit  SOCIAL HISTORY:  Social History     Socioeconomic History   • Marital status: /Civil Union     Spouse name: None   • Number of children: None   • Years of education: None   • Highest education level: Not asked   Occupational History   • Occupation: disabled   Tobacco Use   • Smoking status: Never   • Smokeless tobacco: Never   Vaping Use   • Vaping Use: Never used   Substance and Sexual Activity   • Alcohol use: Not Currently   • Drug use: No   • Sexual activity: Not Currently   Other Topics Concern   • None   Social History Narrative    Daily caffeine consumption 2-3 servings a day     Social Determinants of Health     Financial Resource Strain: Not on file   Food Insecurity: No Food Insecurity   • Worried About Running Out of Food in the Last Year: Never true   • Ran Out of Food in the Last Year: Never true   Transportation Needs: No Transportation Needs   • Lack of Transportation (Medical): No   • Lack of Transportation (Non-Medical): No   Physical Activity: Not on file   Stress: Not on file   Social Connections: Not on file   Intimate Partner Violence: Not on file   Housing Stability: Low Risk    • Unable to Pay for Housing in the Last Year: No   • Number of Places Lived in the Last Year: 1   • Unstable Housing in the Last Year: No        Review of Systems   Constitutional: Negative  HENT: Negative  Eyes: Negative  Respiratory: Negative  Cardiovascular: Negative  Endocrine: Negative  Musculoskeletal: Negative  Skin: Positive for color change  Neurological: Negative  Hematological: Negative  Psychiatric/Behavioral: Negative  Objective:     Physical Exam  Vitals reviewed  Constitutional:       Appearance: Normal appearance  HENT:      Head: Normocephalic and atraumatic        Nose: Nose normal    Eyes: Conjunctiva/sclera: Conjunctivae normal       Pupils: Pupils are equal, round, and reactive to light  Cardiovascular:      Pulses:           Dorsalis pedis pulses are 1+ on the right side  Posterior tibial pulses are 1+ on the right side  Pulmonary:      Effort: Pulmonary effort is normal    Musculoskeletal:        Feet:    Feet:      Right foot:      Skin integrity: Erythema and warmth present  Comments: There is erythema and calor to the medial aspect of the patient's right heel with associated moderate to severe tenderness palpation; there is no fluctuance nor crepitus noted; there is no obvious wound or portal of entry  Skin:     General: Skin is warm  Capillary Refill: Capillary refill takes less than 2 seconds  Neurological:      General: No focal deficit present  Mental Status: He is alert and oriented to person, place, and time  Psychiatric:         Mood and Affect: Mood normal          Behavior: Behavior normal          Thought Content:  Thought content normal

## 2023-03-14 ENCOUNTER — CLINICAL SUPPORT (OUTPATIENT)
Dept: CARDIAC REHAB | Facility: CLINIC | Age: 63
End: 2023-03-14

## 2023-03-14 DIAGNOSIS — I21.4 TYPE 1 NON-ST ELEVATION MYOCARDIAL INFARCTION (NSTEMI) (HCC): Primary | ICD-10-CM

## 2023-03-15 ENCOUNTER — TELEPHONE (OUTPATIENT)
Dept: ENDOCRINOLOGY | Facility: CLINIC | Age: 63
End: 2023-03-15

## 2023-03-15 DIAGNOSIS — E11.65 TYPE 2 DIABETES MELLITUS WITH HYPERGLYCEMIA, WITH LONG-TERM CURRENT USE OF INSULIN (HCC): ICD-10-CM

## 2023-03-15 DIAGNOSIS — Z79.4 TYPE 2 DIABETES MELLITUS WITH HYPERGLYCEMIA, WITH LONG-TERM CURRENT USE OF INSULIN (HCC): ICD-10-CM

## 2023-03-15 RX ORDER — CALCIUM CITRATE/VITAMIN D3 200MG-6.25
1 TABLET ORAL 3 TIMES DAILY
Qty: 300 STRIP | Refills: 1 | Status: SHIPPED | OUTPATIENT
Start: 2023-03-15

## 2023-03-15 RX ORDER — METHOCARBAMOL 750 MG/1
TABLET ORAL
Qty: 12 CAPSULE | Refills: 2 | Status: SHIPPED | OUTPATIENT
Start: 2023-03-15

## 2023-03-15 NOTE — TELEPHONE ENCOUNTER
Wife called he needs his test strips called in she doesn't know the name and she is not home,,,  Anyway 3 strips a day to [facility]

## 2023-03-16 ENCOUNTER — CLINICAL SUPPORT (OUTPATIENT)
Dept: CARDIAC REHAB | Facility: CLINIC | Age: 63
End: 2023-03-16

## 2023-03-16 DIAGNOSIS — I21.4 TYPE 1 NON-ST ELEVATION MYOCARDIAL INFARCTION (NSTEMI) (HCC): Primary | ICD-10-CM

## 2023-03-17 ENCOUNTER — OFFICE VISIT (OUTPATIENT)
Dept: CARDIOLOGY CLINIC | Facility: CLINIC | Age: 63
End: 2023-03-17

## 2023-03-17 VITALS
SYSTOLIC BLOOD PRESSURE: 130 MMHG | OXYGEN SATURATION: 98 % | DIASTOLIC BLOOD PRESSURE: 70 MMHG | HEART RATE: 70 BPM | BODY MASS INDEX: 30.86 KG/M2 | WEIGHT: 209 LBS

## 2023-03-17 DIAGNOSIS — N18.6 TYPE 2 DIABETES MELLITUS WITH CHRONIC KIDNEY DISEASE ON CHRONIC DIALYSIS, WITH LONG-TERM CURRENT USE OF INSULIN (HCC): ICD-10-CM

## 2023-03-17 DIAGNOSIS — I10 PRIMARY HYPERTENSION: ICD-10-CM

## 2023-03-17 DIAGNOSIS — Z99.2 TYPE 2 DIABETES MELLITUS WITH CHRONIC KIDNEY DISEASE ON CHRONIC DIALYSIS, WITH LONG-TERM CURRENT USE OF INSULIN (HCC): ICD-10-CM

## 2023-03-17 DIAGNOSIS — R77.8 ELEVATED TROPONIN: ICD-10-CM

## 2023-03-17 DIAGNOSIS — R07.9 CHEST PAIN, UNSPECIFIED: ICD-10-CM

## 2023-03-17 DIAGNOSIS — Z79.4 TYPE 2 DIABETES MELLITUS WITH CHRONIC KIDNEY DISEASE ON CHRONIC DIALYSIS, WITH LONG-TERM CURRENT USE OF INSULIN (HCC): ICD-10-CM

## 2023-03-17 DIAGNOSIS — Z95.810 PRESENCE OF EXTERNAL CARDIAC DEFIBRILLATOR: ICD-10-CM

## 2023-03-17 DIAGNOSIS — I25.5 ISCHEMIC CARDIOMYOPATHY: Primary | ICD-10-CM

## 2023-03-17 DIAGNOSIS — N18.6 ESRD ON DIALYSIS (HCC): ICD-10-CM

## 2023-03-17 DIAGNOSIS — E11.22 TYPE 2 DIABETES MELLITUS WITH CHRONIC KIDNEY DISEASE ON CHRONIC DIALYSIS, WITH LONG-TERM CURRENT USE OF INSULIN (HCC): ICD-10-CM

## 2023-03-17 DIAGNOSIS — I35.0 NONRHEUMATIC AORTIC VALVE STENOSIS: ICD-10-CM

## 2023-03-17 DIAGNOSIS — Z99.2 ESRD ON DIALYSIS (HCC): ICD-10-CM

## 2023-03-17 DIAGNOSIS — I25.10 CAD, MULTIPLE VESSEL: ICD-10-CM

## 2023-03-17 PROBLEM — R79.89 ELEVATED TROPONIN: Status: ACTIVE | Noted: 2023-03-17

## 2023-03-17 RX ORDER — METOPROLOL SUCCINATE 50 MG/1
50 TABLET, EXTENDED RELEASE ORAL 2 TIMES DAILY
Qty: 180 TABLET | Refills: 3 | Status: SHIPPED | OUTPATIENT
Start: 2023-03-17

## 2023-03-17 NOTE — PROGRESS NOTES
[facility]    Name:Asad Roman   DOS: 3/17/2023     Chief Complaint: No chief complaint on file  HISTORY OF PRESENT ILLNESS:    HPI:  Bhavana Dang is a 61 y o  male  He  has a past medical history of Cerebrovascular accident (CVA) due to thrombosis of left middle cerebral artery ([facility] [address] ) (7/29/2018), Chronic kidney disease, Diabetes mellitus ([facility] [address] ), GERD (gastroesophageal reflux disease), Hypercholesteremia, Hyperlipidemia, Hypertension, Infectious viral hepatitis, Neuropathy, Obesity, Osteomyelitis ([facility] [address] ), PVC's (premature ventricular contractions), Stroke ([facility] ), and TIA (transient ischemic attack) (10/28/2018)  This is a 61 y o  male with a past medical history significant for CAD status post multiple stents and repeat intervention, ESRD on hemodialysis, type 2 diabetes, diabetic polyneuropathy, hypertension, hyperlipidemia, CVA, obesity  He presents for follow up evaluation after recent hospitalization  Please see my inpatient assessment for a complete detail of his cardiac history and my thoughts regarding his medical management of CAD and ischemic cardiomyopathy  Per my consultation note in the hospital:  He was admitted to the hospital after complaining of chest discomfort during his hemodialysis session yesterday  The patient reports left-sided chest discomfort that lasted approximately 1 hour until dialysis was completed  He presents today for follow up evaluation after recent discharge  Reports that he's been doing very well since leaving the hospital  No recurrent chest pain with HD  He specifically also denies shortness of breath, diaphoresis, dizziness, palpitations, orthopnea, PND, edema, syncope  ROS    ROS: Pertinent positives and negatives as described in History of Present Illness  Remainder of a 14 point review of systems was negative       No Known Allergies     Current Outpatient Medications on File Prior to Visit   Medication Sig Dispense Refill   • aspirin (ECOTRIN LOW STRENGTH) 81 mg EC tablet Take 81 mg by mouth daily Resume on 8/14       • atorvastatin (LIPITOR) 40 mg tablet TAKE 1 TABLET BY MOUTH EVERY DAY WITH DINNER 90 tablet 1   • Blood Glucose Monitoring Suppl (True Metrix Meter) w/Device KIT Use to test blood sugars 3 times daily 1 kit 0   • Blood Pressure Monitoring (BLOOD PRESSURE CUFF) MISC Use to check blood pressure before taking blood pressure medication and 1 hour after and follow instructions provided in discharge instructions based on the readings  1 each 0   • calcium acetate (CALPHRON) 667 mg 3 tablets 3x per day     • cephalexin (KEFLEX) 500 mg capsule Take 1 capsule (500 mg total) by mouth every 12 (twelve) hours for 7 days 14 capsule 1   • D3-50 1 25 MG (76791 UT) capsule TAKE 1 CAPSULE BY MOUTH ONE TIME PER WEEK 12 capsule 2   • divalproex sodium (DEPAKOTE SPRINKLE) 125 MG capsule TAKE 2 CAPSULES (250 MG TOTAL) BY MOUTH EVERY 12 (TWELVE) HOURS 360 capsule 1   • glucose blood (True Metrix Blood Glucose Test) test strip Use 1 each 3 (three) times a day Use as instructed 300 strip 1   • Insulin Pen Needle (BD Pen Needle Adina U/F) 32G X 4 MM MISC Use 4 times daily 400 each 1   • Insulin Syringe-Needle U-100 (B-D INS SYRINGE 0 5CC/30GX1/2") 30G X 1/2" 0 5 ML MISC Inject once daily 100 each 1   • Lancets Ultra Thin 30G MISC Use 3 times a day 300 each 0   • metoprolol succinate (TOPROL-XL) 25 mg 24 hr tablet Take 1 tablet (25 mg total) by mouth 2 (two) times a day 60 tablet 0   • ONETOUCH DELICA LANCETS 69V MISC by Does not apply route 3 (three) times a day 270 each 1   • oxyCODONE (Roxicodone) 5 immediate release tablet Take 0 5 tablets (2 5 mg total) by mouth every 6 (six) hours as needed for moderate pain for up to 10 days Max Daily Amount: 10 mg 10 tablet 0   • prasugrel (EFFIENT) tablet Take 1 tablet (5 mg total) by mouth daily 90 tablet 3     No current facility-administered medications on file prior to visit         Past Medical History: Diagnosis Date   • Cerebrovascular accident (CVA) due to thrombosis of left middle cerebral artery ([facility] [address] ) 7/29/2018   • Chronic kidney disease    • [facility] mellitus [facility]    • GERD (gastroesophageal reflux disease)    • Hypercholesteremia    • Hyperlipidemia    • Hypertension    • 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   • Neuropathy    • Obesity    • Osteomyelitis ([facility] [address] )     last assessed 11/4/16   • PVC's (premature ventricular contractions)     sees cardiology Dr Dat camargo   • Stroke [facility])     last weeof July 2018 [address] [facility]   • TIA (transient ischemic attack) 10/28/2018       Past Surgical History:   Procedure Laterality Date   • ABDOMINAL SURGERY     • CARDIAC CATHETERIZATION N/A 5/2/2022    Procedure: Cardiac Coronary Angiogram;  Surgeon: Karishma Hirsch MD;  [facility]: [facility] CARDIAC [facility]ATH [facility]; Service: Cardiology   • CARDIAC CATHETERIZATION N/A 5/2/2022    Procedure: Cardiac pci;  Surgeon: Karishma Hirsch MD;  [facility]: [facility] CARDIAC [facility]ATH [facility]; Service: Cardiology   • CARDIAC CATHETERIZATION  2/1/2023    Procedure: Cardiac catheterization;  Surgeon: Karishma Hirsch MD;  Location: [facility] CARDIAC CATH [facility]; Service: Cardiology   • CARDIAC CATHETERIZATION N/A 2/1/2023    Procedure: Cardiac pci;  Surgeon: Karishma Hirsch MD;  Location: [facility] CARDIAC [facility]ATH [facility]; Service: Cardiology   • CARDIAC CATHETERIZATION N/A 2/1/2023    Procedure: Cardiac Coronary Angiogram;  Surgeon: Karishma Hirsch MD;  Location: [facility] CARDIAC [facility]; Service: Cardiology   • CARDIAC CATHETERIZATION N/A 2/1/2023    Procedure: Cardiac other-IVUS;  Surgeon: Karishma Hirsch MD;  Location: [facility] CARDIAC [facility]ATH [facility]; Service: Cardiology   • CHOLECYSTECTOMY      Percutaneous   • COLONOSCOPY     • CYSTOSCOPY     • OTHER SURGICAL HISTORY      "stimulator to control bowel movements"   • MD ESOPHAGOGASTRODUODENOSCOPY TRANSORAL DIAGNOSTIC N/A 9/27/2016    Procedure: ESOPHAGOGASTRODUODENOSCOPY (EGD); Surgeon: Nick Rose MD;  Location: [facility] GI LAB;   Service: Gastroenterology   • MN LAPAROSCOPY SURG CHOLECYSTECTOMY N/A 2/29/2016    Procedure: LAPAROSCOPIC CHOLECYSTECTOMY ;  Surgeon: Jackelin Leahy DO;  Location: [facility] Main OR;  Service: General   • ROTATOR CUFF REPAIR Right    • TOE AMPUTATION Right 10/28/2016    Procedure: 3RD TOE AMPUTATION ;  Surgeon: Nisha Lin DPM;  Location: [facility] Main OR;  Service:        Family History   Problem Relation Age of Onset   • Leukemia Mother    • Liver disease Mother    • Lung cancer Mother         heavy smoker - 3 ppd   • Heart disease Father    • Liver disease Father    • Multiple myeloma Sister    • Breast cancer Sister    • Urolithiasis Family    • Alcohol abuse Neg Hx    • Depression Neg Hx    • Drug abuse Neg Hx    • Substance Abuse Neg Hx    • Mental illness Neg Hx        Social History     Socioeconomic History   • Marital status: /Civil Union     Spouse name: Not on file   • Number of children: Not on file   • Years of education: Not on file   • Highest education level: Not asked   Occupational History   • Occupation: disabled   Tobacco Use   • Smoking status: Never   • Smokeless tobacco: Never   Vaping Use   • Vaping Use: Never used   Substance and Sexual Activity   • Alcohol use: Not Currently   • Drug use: No   • Sexual activity: Not Currently   Other Topics Concern   • Not on file   Social History Narrative    Daily caffeine consumption 2-3 servings a day     Social Determinants of Health     Financial Resource Strain: Not on file   Food Insecurity: No Food Insecurity   • Worried About Running Out of Food in the Last Year: Never true   • Ran Out of Food in the Last Year: Never true   Transportation Needs: No Transportation Needs   • Lack of Transportation (Medical): No   • Lack of Transportation (Non-Medical):  No   Physical Activity: Not on file   Stress: Not on file   Social Connections: Not on file   Intimate Partner Violence: Not on file   Housing Stability: Low Risk    • Unable to Pay for Housing in the Last Year: No   • Number of Places Lived in the Last Year: 1   • Unstable Housing in the Last Year: No       OBJECTIVE:    There were no vitals taken for this visit  BP Readings from Last 3 Encounters:   03/13/23 123/77   03/11/23 161/76   03/10/23 113/52       Wt Readings from Last 3 Encounters:   03/13/23 98 8 kg (217 lb 12 8 oz)   03/09/23 96 2 kg (212 lb)   03/07/23 96 2 kg (212 lb)         Physical Exam  Vitals reviewed  Constitutional:       General: He is not in acute distress  Appearance: Normal appearance  He is ill-appearing  He is not diaphoretic  Comments: LifeVest external cardiac defibrillator   HENT:      Head: Normocephalic and atraumatic  Eyes:      Conjunctiva/sclera: Conjunctivae normal    Neck:      Vascular: No carotid bruit or JVD  Cardiovascular:      Rate and Rhythm: Normal rate and regular rhythm  Pulses: Normal pulses  Heart sounds: Normal heart sounds  No murmur heard  No friction rub  No gallop  Pulmonary:      Effort: Pulmonary effort is normal       Breath sounds: Normal breath sounds  No wheezing, rhonchi or rales  Abdominal:      General: Abdomen is flat  Bowel sounds are normal  There is no distension  Palpations: Abdomen is soft  Musculoskeletal:      Right lower leg: No edema  Left lower leg: No edema  Skin:     General: Skin is warm and dry  Comments: LUE AV fistula   Neurological:      General: No focal deficit present  Mental Status: He is alert and oriented to person, place, and time  Psychiatric:         Mood and Affect: Mood normal          Behavior: Behavior normal                                                        Cardiac testing:   EKG reviewed personally from 3/8/23  My read: Sinus rhythm  RBBB  LVH with repolarization changes       Results for orders placed during the hospital encounter of 10/05/20    Echo complete with contrast if indicated    [place]  [address] [id number]  [phone]    Transthoracic Echocardiogram  2D, M-mode, Doppler, and Color Doppler    Study date:  06-Oct-2020    Patient: Melissa Jamison  MR number: [id number]  Account number: [de-identified]  : 1960  Age: 61 years  Gender: Male  Status: Inpatient  Location: Bedside  Height: 70 in  Weight: 239 6 lb  BP: 165/ 80 mmHg    Indications: Shortness of breath  Diagnoses: R06 02 - Shortness of breath    Sonographer:  Ailyn Mitchell RDCS  Primary Physician:  Ida Caballero DO  Referring Physician:  Indira Quiros MD  Group:  [facility]  Interpreting Physician:  Otilio James MD    SUMMARY    LEFT VENTRICLE:  Systolic function was normal by visual assessment  Ejection fraction was estimated to be 60 %  There were no regional wall motion abnormalities  Wall thickness was moderately increased  Features were consistent with a pseudonormal left ventricular filling pattern, with concomitant abnormal relaxation and increased filling pressure (grade 2 diastolic dysfunction)  LEFT ATRIUM:  The atrium was mildly dilated  RIGHT ATRIUM:  The atrium was mildly dilated  MITRAL VALVE:  There was moderate annular calcification  There was mild regurgitation  AORTIC VALVE:  The valve was trileaflet  Leaflets exhibited normal thickness, moderate calcification, and moderately reduced cuspal separation  Transaortic velocity was increased due to valvular stenosis  There was mild to moderate stenosis  There was mild regurgitation  TRICUSPID VALVE:  There was trace regurgitation  PULMONIC VALVE:  There was mild regurgitation  HISTORY: PRIOR HISTORY: DM2  CKD4  Hypertension  CVA  GERD  Dementia  PROCEDURE: The procedure was performed at the bedside  This was a routine study  The transthoracic approach was used  The study included complete 2D imaging, M-mode, complete spectral Doppler, and color Doppler  The heart rate was 98 bpm,  at the start of the study   Image quality was adequate  LEFT VENTRICLE: Size was normal  Systolic function was normal by visual assessment  Ejection fraction was estimated to be 60 %  There were no regional wall motion abnormalities  Wall thickness was moderately increased  DOPPLER: The ratio  of early ventricular filling to atrial contraction velocities was within the normal range  Features were consistent with a pseudonormal left ventricular filling pattern, with concomitant abnormal relaxation and increased filling pressure  (grade 2 diastolic dysfunction)  RIGHT VENTRICLE: The size was normal  Systolic function was normal  DOPPLER: Systolic pressure was not estimated  LEFT ATRIUM: The atrium was mildly dilated  RIGHT ATRIUM: The atrium was mildly dilated  MITRAL VALVE: There was moderate annular calcification  Valve structure was normal  There was normal leaflet separation  DOPPLER: The transmitral velocity was within the normal range  There was no evidence for stenosis  There was mild  regurgitation  AORTIC VALVE: The valve was trileaflet  Leaflets exhibited normal thickness, moderate calcification, and moderately reduced cuspal separation  DOPPLER: Transaortic velocity was increased due to valvular stenosis  There was mild to moderate  stenosis  There was mild regurgitation  TRICUSPID VALVE: The valve structure was normal  There was normal leaflet separation  DOPPLER: The transtricuspid velocity was within the normal range  There was no evidence for stenosis  There was trace regurgitation  PULMONIC VALVE: Leaflets exhibited normal thickness, no calcification, and normal cuspal separation  DOPPLER: The transpulmonic velocity was within the normal range  There was mild regurgitation  PERICARDIUM: There was no pericardial effusion  AORTA: The root exhibited normal size  SYSTEMIC VEINS: IVC: The inferior vena cava was normal in size and course   Respirophasic changes were normal     SYSTEM MEASUREMENT TABLES    2D  %FS: 36 09 %  Ao Diam: 3 8 cm  EDV(Teich): 182 77 ml  EF(Teich): 64 8 %  ESV(Teich): 64 33 ml  IVSd: 1 57 cm  LA Area: 24 31 cm2  LA Diam: 4 63 cm  LVEDV MOD A4C: 192 34 ml  LVEF MOD A4C: 65 32 %  LVESV MOD A4C: 66 7 ml  LVIDd: 6 04 cm  LVIDs: 3 86 cm  LVLd A4C: 9 02 cm  LVLs A4C: 7 15 cm  LVOT Diam: 2 21 cm  LVPWd: 1 61 cm  RA Area: 17 56 cm2  RVIDd: 4 cm  SV MOD A4C: 125 64 ml  SV(Teich): 118 44 ml    CW  AV Env  Ti: 330 16 ms  AV MaxP 64 mmHg  AV VTI: 57 9 cm  AV Vmax: 2 58 m/s  AV Vmean: 1 76 m/s  AV meanP 38 mmHg  TR MaxP 37 mmHg  TR Vmax: 2 71 m/s    MM  TAPSE: 1 84 cm    PW  FATUMA (VTI): 1 27 cm2  FATUMA Vmax: 1 42 cm2  AVAI (VTI): 0 cm2/m2  AVAI Vmax: 0 cm2/m2  E' Sept: 0 07 m/s  E/E' Sept: 12 69  LVOT Env  Ti: 335 87 ms  LVOT VTI: 19 11 cm  LVOT Vmax: 0 95 m/s  LVOT Vmean: 0 57 m/s  LVOT maxPG: 3 63 mmHg  LVOT meanP 61 mmHg  LVSI Dopp: 32 42 ml/m2  LVSV Dopp: 73 27 ml  MV A Carlin: 0 64 m/s  MV Dec Starr: 4 7 m/s2  MV DecT: 189 67 ms  MV E Carlin: 0 89 m/s  MV E/A Ratio: 1 38  MV PHT: 55 01 ms  MVA By PHT: 4 cm2    [address] Accredited Echocardiography Laboratory    Prepared and electronically signed by    Marya Moralez MD  Signed 06-Oct-2020 16:31:49          LABS:  Lab Results   Component Value Date    GLUCOSE 142 (H) 2023    BUN 62 (H) 03/10/2023    CREATININE 8 37 (H) 03/10/2023    CALCIUM 9 2 03/10/2023     08/10/2016    K 4 7 03/10/2023    CO2 21 03/10/2023     03/10/2023    ALKPHOS 62 2023    BILITOT 0 6 08/10/2016    PROT 6 7 08/10/2016    AST 10 2023    ALT 12 2023    ANIONGAP 9 2016        Lab Results   Component Value Date    WBC 5 84 03/10/2023    HGB 10 3 (L) 03/10/2023    HCT 32 2 (L) 03/10/2023    MCV 94 03/10/2023     03/10/2023       Lab Results   Component Value Date    CHOL 203 (H) 08/10/2016    HDL 30 (L) 2023    LDLCALC 21 2023    TRIG 123 2023       Lab Results   Component Value Date    HGBA1C 5 5 2022 No results found for: TSH      ASSESSMENT/PLAN:  Diagnoses and all orders for this visit:    Ischemic cardiomyopathy  - Presently on a suboptimal GDMT regimen consisting only of Toprol-XL due to baseline renal dysfunction and concerns that he would not be a transplant candidate if he were on full quad therapy  Will up-titrate his Toprol XL dose  Patient advised to monitor HR and BP at home  CAD, multiple vessel  - Remains chest pain free  No recurrent chest discomfort with dialysis  - He recently underwent coronary angiography with ADE placement to in stent re-stenosis of a prior LAD lesion that was treated  - Previously deemed a Plavix nonresponder due to suspected in-stent restenosis   Maintained on Effient 5 mg once daily since approximately August 2022, although its unclear to me why he is on this medication with a known history of CVA in the past and a clear expert consensus warning on the use of this medication in patients with a history of stroke due to a signal towards increased intracerebral hemorrhage in this patient population  Patient's son states that he was tried on Brilinta in the past after being deemed a Plavix nonresponder, but also found to apparently have had stent complications on Brilinta  I am unable to find documentation of this in the chart available to me  - For now, I have recommended that he continue Effient with Aspirin until we can obtain clarification from his Cardiologist in [place]  Elevated troponin  - Suspect non-MI in the setting of baseline underlying CAD and known ESRD  No additional ischemia evaluation warranted at this time given absence of symptoms and known coronary anatomy  Nonrheumatic aortic valve stenosis  - Moderate severity based upon his most recent ECHO from 1/30/23: aortic valve peak velocity is 1 87 m/s  The aortic valve peak gradient is 14 mmHg  The aortic valve mean gradient is 9 mmHg  The dimensionless velocity index is 0 36   The aortic valve area is 1 97 cm2  Primary hypertension  BP Readings from Last 6 Encounters:   03/17/23 130/70   03/13/23 123/77   03/11/23 161/76   03/10/23 113/52   03/07/23 117/69   03/06/23 134/66   Stable  No on optimal GDMT as noted above due to concerns with not receiving his kidney transplant  I advised careful home blood pressure monitoring  ESRD on dialysis ([facility] [address] )  No recurrent CP with HD  Patient is undergoing transplantation evaluation  Type 2 diabetes mellitus with chronic kidney disease on chronic dialysis, with long-term current use of insulin (HCC)  Well controlled    Presence of external cardiac defibrillator  For primary prevention  Repeat ECHO end of April for disposition re: ICD           Mari Lazcano MD

## 2023-03-17 NOTE — PATIENT INSTRUCTIONS
You were seen today in the Cardiology office for post hospital follow up  Please continue your current cardiac medications as prescribed  Schedule your echocardiogram     Thank you for choosing [address]  Please call our office or use [employer] with any questions

## 2023-03-21 ENCOUNTER — CLINICAL SUPPORT (OUTPATIENT)
Dept: CARDIAC REHAB | Facility: CLINIC | Age: 63
End: 2023-03-21

## 2023-03-21 DIAGNOSIS — I21.4 TYPE 1 NON-ST ELEVATION MYOCARDIAL INFARCTION (NSTEMI) (HCC): Primary | ICD-10-CM

## 2023-03-27 ENCOUNTER — HOSPITAL ENCOUNTER (EMERGENCY)
Facility: HOSPITAL | Age: 63
Discharge: HOME | End: 2023-03-27
Attending: EMERGENCY MEDICINE

## 2023-03-27 ENCOUNTER — APPOINTMENT (EMERGENCY)
Dept: RADIOLOGY | Facility: HOSPITAL | Age: 63
End: 2023-03-27

## 2023-03-27 VITALS
SYSTOLIC BLOOD PRESSURE: 121 MMHG | HEART RATE: 88 BPM | OXYGEN SATURATION: 100 % | DIASTOLIC BLOOD PRESSURE: 59 MMHG | RESPIRATION RATE: 18 BRPM | TEMPERATURE: 97.7 F

## 2023-03-27 DIAGNOSIS — L03.116 CELLULITIS OF LEFT FOOT: Primary | ICD-10-CM

## 2023-03-27 LAB
ANION GAP SERPL CALCULATED.3IONS-SCNC: 8 MMOL/L (ref 4–13)
BASOPHILS # BLD AUTO: 0.04 THOUSANDS/ÂΜL (ref 0–0.1)
BASOPHILS NFR BLD AUTO: 1 % (ref 0–1)
BUN SERPL-MCNC: 14 MG/DL (ref 5–25)
CALCIUM SERPL-MCNC: 9.2 MG/DL (ref 8.4–10.2)
CHLORIDE SERPL-SCNC: 95 MMOL/L (ref 96–108)
CO2 SERPL-SCNC: 33 MMOL/L (ref 21–32)
CREAT SERPL-MCNC: 3.94 MG/DL (ref 0.6–1.3)
CRP SERPL QL: 16.4 MG/L
EOSINOPHIL # BLD AUTO: 0.11 THOUSAND/ÂΜL (ref 0–0.61)
EOSINOPHIL NFR BLD AUTO: 2 % (ref 0–6)
ERYTHROCYTE [DISTWIDTH] IN BLOOD BY AUTOMATED COUNT: 17.2 % (ref 11.6–15.1)
GFR SERPL CREATININE-BSD FRML MDRD: 15 ML/MIN/1.73SQ M
GLUCOSE SERPL-MCNC: 84 MG/DL (ref 65–140)
HCT VFR BLD AUTO: 31.2 % (ref 36.5–49.3)
HGB BLD-MCNC: 9.7 G/DL (ref 12–17)
IMM GRANULOCYTES # BLD AUTO: 0.06 THOUSAND/UL (ref 0–0.2)
IMM GRANULOCYTES NFR BLD AUTO: 1 % (ref 0–2)
LYMPHOCYTES # BLD AUTO: 1.01 THOUSANDS/ÂΜL (ref 0.6–4.47)
LYMPHOCYTES NFR BLD AUTO: 16 % (ref 14–44)
MCH RBC QN AUTO: 30.4 PG (ref 26.8–34.3)
MCHC RBC AUTO-ENTMCNC: 31.1 G/DL (ref 31.4–37.4)
MCV RBC AUTO: 98 FL (ref 82–98)
MONOCYTES # BLD AUTO: 0.62 THOUSAND/ÂΜL (ref 0.17–1.22)
MONOCYTES NFR BLD AUTO: 10 % (ref 4–12)
NEUTROPHILS # BLD AUTO: 4.3 THOUSANDS/ÂΜL (ref 1.85–7.62)
NEUTS SEG NFR BLD AUTO: 70 % (ref 43–75)
NRBC BLD AUTO-RTO: 0 /100 WBCS
PLATELET # BLD AUTO: 159 THOUSANDS/UL (ref 149–390)
PMV BLD AUTO: 10.6 FL (ref 8.9–12.7)
POTASSIUM SERPL-SCNC: 3.9 MMOL/L (ref 3.5–5.3)
RBC # BLD AUTO: 3.19 MILLION/UL (ref 3.88–5.62)
SODIUM SERPL-SCNC: 136 MMOL/L (ref 135–147)
WBC # BLD AUTO: 6.14 THOUSAND/UL (ref 4.31–10.16)

## 2023-03-27 RX ORDER — CEPHALEXIN 500 MG/1
500 CAPSULE ORAL EVERY 12 HOURS SCHEDULED
Qty: 14 CAPSULE | Refills: 0 | Status: SHIPPED | OUTPATIENT
Start: 2023-03-27 | End: 2023-04-03

## 2023-03-27 RX ORDER — CEFAZOLIN SODIUM 1 G/50ML
1000 SOLUTION INTRAVENOUS ONCE
Status: COMPLETED | OUTPATIENT
Start: 2023-03-27 | End: 2023-03-27

## 2023-03-27 RX ADMIN — CEFAZOLIN SODIUM 1000 MG: 1 SOLUTION INTRAVENOUS at 20:06

## 2023-03-27 NOTE — ED PROVIDER NOTES
"History  Chief Complaint   Patient presents with   • Foot Pain     Pt arrives c/o L foot pain since last wk, put on antibiotics x 7 days, since complete and pain unresolved  Denies fevers     27-year-old diabetic male coming into the ED for evaluation of left heel pain  He states he was on antibiotics recently for right foot cellulitis, was on Keflex for about a week  He denies any fevers or chills  He is a dialysis patient on HD, last treatment was today  Complains of pain with walking and to touch  History provided by:  Patient   used: No        Prior to Admission Medications   Prescriptions Last Dose Informant Patient Reported? Taking? Blood Glucose Monitoring Suppl (True Metrix Meter) w/Device KIT   No No   Sig: Use to test blood sugars 3 times daily   Blood Pressure Monitoring (BLOOD PRESSURE CUFF) MISC   No No   Sig: Use to check blood pressure before taking blood pressure medication and 1 hour after and follow instructions provided in discharge instructions based on the readings     D3-50 1 25 MG (33039 UT) capsule   No No   Sig: TAKE 1 CAPSULE BY MOUTH ONE TIME PER WEEK   Insulin Pen Needle (BD Pen Needle Adina U/F) 32G X 4 MM MISC   No No   Sig: Use 4 times daily   Insulin Syringe-Needle U-100 (B-D INS SYRINGE 0 5CC/30GX1/2\") 30G X 1/2\" 0 5 ML MISC   No No   Sig: Inject once daily   Lancets Ultra Thin 30G MISC   No No   Sig: Use 3 times a day   ONETOUCH DELICA LANCETS 65E MISC   No No   Sig: by Does not apply route 3 (three) times a day   aspirin (ECOTRIN LOW STRENGTH) 81 mg EC tablet   Yes No   Sig: Take 81 mg by mouth daily Resume on 8/14     atorvastatin (LIPITOR) 40 mg tablet   No No   Sig: TAKE 1 TABLET BY MOUTH EVERY DAY WITH DINNER   calcium acetate (CALPHRON) 667 mg   Yes No   Sig: 3 tablets 3x per day   divalproex sodium (DEPAKOTE SPRINKLE) 125 MG capsule   No No   Sig: TAKE 2 CAPSULES (250 MG TOTAL) BY MOUTH EVERY 12 (TWELVE) HOURS   glucose blood (True Metrix Blood " Glucose Test) test strip   No No   Sig: Use 1 each 3 (three) times a day Use as instructed   metoprolol succinate (TOPROL-XL) 50 mg 24 hr tablet   No No   Sig: Take 1 tablet (50 mg total) by mouth 2 (two) times a day   prasugrel (EFFIENT) tablet   No No   Sig: Take 1 tablet (5 mg total) by mouth daily      Facility-Administered Medications: None       Past Medical History:   Diagnosis Date   • Cerebrovascular accident (CVA) due to thrombosis of left middle cerebral artery ([place] [address] ) 07/29/2018   • Chronic kidney disease    • Diabetes mellitus ([place] [address] )    • Dialysis patient [facility])     M-W-F   • GERD (gastroesophageal reflux disease)    • Hypercholesteremia    • Hyperlipidemia    • Hypertension    • 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   • Neuropathy    • Obesity    • Osteomyelitis ([place] [address] )     last assessed 11/4/16   • PVC's (premature ventricular contractions)     sees cardiology Dr Carly camargo   • Stroke [facility])     last weeof July 2018 [facility]   • TIA (transient ischemic attack) 10/28/2018       Past Surgical History:   Procedure Laterality Date   • ABDOMINAL SURGERY     • CARDIAC CATHETERIZATION N/A 5/2/2022    Procedure: Cardiac Coronary Angiogram;  Surgeon: Rachel Caldwell MD;  [facility]; Service: Cardiology   • CARDIAC CATHETERIZATION N/A 5/2/2022    Procedure: Cardiac pci;  Surgeon: Rachel Caldwell MD;  [facility]; Service: Cardiology   • CARDIAC CATHETERIZATION  2/1/2023    Procedure: Cardiac catheterization;  Surgeon: Rachel Caldwell MD;  [facility] CARD[facility]; Service: Cardiology   • CARDIAC CATHETERIZATION N/A 2/1/2023    Procedure: Cardiac pci;  Surgeon: Rachel Caldwell MD;  [facility]ARD[facility]; Service: Cardiology   • CARDIAC CATHETERIZATION N/A 2/1/2023    Procedure: Cardiac Coronary Angiogram;  Surgeon: Rachel Caldwell MD;  [facility]: [facility] CARD[facility]AC [facility];   Service: Cardiology   • CARDIAC CATHETERIZATION N/A 2/1/2023    Procedure: Cardiac "other-IVUS;  Surgeon: Lisa Hernandez MD;  [facility]: [facility] CARDIAC [facility]ATH [facility]; Service: Cardiology   • CHOLECYSTECTOMY      Percutaneous   • COLONOSCOPY     • CYSTOSCOPY     • OTHER SURGICAL HISTORY      \"stimulator to control bowel movements\"   • DE ESOPHAGOGASTRODUODENOSCOPY TRANSORAL DIAGNOSTIC N/A 9/27/2016    Procedure: ESOPHAGOGASTRODUODENOSCOPY (EGD); Surgeon: Mony Lofton MD;  Location: [facility] GI LAB; Service: Gastroenterology   • DE LAPAROSCOPY SURG CHOLECYSTECTOMY N/A 2/29/2016    Procedure: LAPAROSCOPIC CHOLECYSTECTOMY ;  Surgeon: Eliane Habermann, DO;  Location: [facility] Main OR;  Service: General   • ROTATOR CUFF REPAIR Right    • TOE AMPUTATION Right 10/28/2016    Procedure: 3RD TOE AMPUTATION ;  Surgeon: Ailyn Reeves DPM;  Location: [facility] Main OR;  Service:        Family History   Problem Relation Age of Onset   • Leukemia Mother    • Liver disease Mother    • Lung cancer Mother         heavy smoker - 3 ppd   • Heart disease Father    • Liver disease Father    • Multiple myeloma Sister    • Breast cancer Sister    • Urolithiasis Family    • Alcohol abuse Neg Hx    • Depression Neg Hx    • Drug abuse Neg Hx    • Substance Abuse Neg Hx    • Mental illness Neg Hx      I have reviewed and agree with the history as documented  E-Cigarette/Vaping   • E-Cigarette Use Never User      E-Cigarette/Vaping Substances   • Nicotine No    • THC No    • CBD No    • Flavoring No    • Other No    • Unknown No      Social History     Tobacco Use   • Smoking status: Never   • Smokeless tobacco: Never   Vaping Use   • Vaping Use: Never used   Substance Use Topics   • Alcohol use: Not Currently   • Drug use: No       Review of Systems   Musculoskeletal:        Left foot pain   Skin:        Left foot pain and swelling   All other systems reviewed and are negative  Physical Exam  Physical Exam  Vitals and nursing note reviewed  Constitutional:       General: He is not in acute distress  Appearance: Normal appearance   He " is normal weight  He is not ill-appearing, toxic-appearing or diaphoretic  HENT:      Head: Normocephalic and atraumatic  Right Ear: External ear normal       Left Ear: External ear normal       Nose: Nose normal  No congestion or rhinorrhea  Mouth/Throat:      Mouth: Mucous membranes are moist       Pharynx: Oropharynx is clear  Eyes:      General: No scleral icterus  Right eye: No discharge  Left eye: No discharge  Conjunctiva/sclera: Conjunctivae normal    Cardiovascular:      Rate and Rhythm: Normal rate and regular rhythm  Pulses: Normal pulses  Heart sounds: Normal heart sounds  Pulmonary:      Effort: Pulmonary effort is normal  No respiratory distress  Breath sounds: Normal breath sounds  Abdominal:      General: Abdomen is flat  Palpations: Abdomen is soft  Tenderness: There is no abdominal tenderness  Musculoskeletal:         General: No swelling  Normal range of motion  Cervical back: Normal range of motion and neck supple  Skin:     General: Skin is warm and dry  Capillary Refill: Capillary refill takes less than 2 seconds  Comments: Left foot, there is mild swelling, erythema, warmth to the ankle, heel  The heel is very dry, cracked in multiple places (4) with evidence of prior bleeding, scabbing  Neurological:      General: No focal deficit present  Mental Status: He is alert and oriented to person, place, and time  Mental status is at baseline     Psychiatric:         Mood and Affect: Mood normal          Behavior: Behavior normal          Vital Signs  ED Triage Vitals [03/27/23 1707]   Temperature Pulse Respirations Blood Pressure SpO2   97 7 °F (36 5 °C) 88 18 121/59 100 %      Temp Source Heart Rate Source Patient Position - Orthostatic VS BP Location FiO2 (%)   Oral Monitor Lying Right arm --      Pain Score       --           Vitals:    03/27/23 1707   BP: 121/59   Pulse: 88   Patient Position - Orthostatic VS: Lying         Visual Acuity      ED Medications  Medications   ceFAZolin (ANCEF) IVPB (premix in dextrose) 1,000 mg 50 mL (0 mg Intravenous Stopped 3/27/23 2036)       Diagnostic Studies  Results Reviewed     Procedure Component Value Units Date/Time    Basic metabolic panel [[id number]]  (Abnormal) Collected: 03/27/23 1830    Lab Status: Final result Specimen: Blood from Arm, Right Updated: 03/27/23 1901     Sodium 136 mmol/L      Potassium 3 9 mmol/L      Chloride 95 mmol/L      CO2 33 mmol/L      ANION GAP 8 mmol/L      BUN 14 mg/dL      Creatinine 3 94 mg/dL      Glucose 84 mg/dL      Calcium 9 2 mg/dL      eGFR 15 ml/min/1 73sq m     Narrative:      [place] guidelines for Chronic Kidney Disease (CKD):   •  Stage 1 with normal or high GFR (GFR > 90 mL/min/1 73 square meters)  •  Stage 2 Mild CKD (GFR = 60-89 mL/min/1 73 square meters)  •  Stage 3A Moderate CKD (GFR = 45-59 mL/min/1 73 square meters)  •  Stage 3B Moderate CKD (GFR = 30-44 mL/min/1 73 square meters)  •  Stage 4 Severe CKD (GFR = 15-29 mL/min/1 73 square meters)  •  Stage 5 End Stage CKD (GFR <15 mL/min/1 73 square meters)  Note: GFR calculation is accurate only with a steady state creatinine    C-reactive protein [[id number]]  (Abnormal) Collected: 03/27/23 1830    Lab Status: Final result Specimen: Blood from Arm, Right Updated: 03/27/23 1901     CRP 16 4 mg/L     CBC and differential [[id number]]  (Abnormal) Collected: 03/27/23 1830    Lab Status: Final result Specimen: Blood from Arm, Right Updated: 03/27/23 1837     WBC 6 14 Thousand/uL      RBC 3 19 Million/uL      Hemoglobin 9 7 g/dL      Hematocrit 31 2 %      MCV 98 fL      MCH 30 4 pg      MCHC 31 1 g/dL      RDW 17 2 %      MPV 10 6 fL      Platelets 193 Thousands/uL      nRBC 0 /100 WBCs      Neutrophils Relative 70 %      Immat GRANS % 1 %      Lymphocytes Relative 16 %      Monocytes Relative 10 %      Eosinophils Relative 2 %      Basophils Relative 1 % Neutrophils Absolute 4 30 Thousands/µL      Immature Grans Absolute 0 06 Thousand/uL      Lymphocytes Absolute 1 01 Thousands/µL      Monocytes Absolute 0 62 Thousand/µL      Eosinophils Absolute 0 11 Thousand/µL      Basophils Absolute 0 04 Thousands/µL                  XR foot 3+ views LEFT   Final Result by Glenna Medina MD (03/28 2010)      No acute osseous abnormality  Workstation performed: [id number]                    Procedures  Procedures         ED Course                                             Medical Decision Making  60 yo diabetic M w/ left foot pain  Concerned for cellulitis, the patient recently was treated for cellulitis to the right foot with keflex and did improve  No systemic symptoms, pt overall well appearing  Exam shows very dry skin, w/ cracking and bleeding  Also mild erythema and warmth around the heel and lower ankle  Labs and xray to assess for more significant infection, such as osteomyelitis, severe cellulitis, sepsis  Labs and xray unremarkable, low inflammatory markers and normal white count  Stable for d/c home, IV ancef x 1 given in the ED, start on keflex, f/u podiatry for cellulitis  Advised lotions, moisturizing of the feet to prevent cracking  Considered admission - the patient's wife asked for him to be admitted, but given he looks so well, not septic, able to walk, I recommended d/c on Keflex which they were both agreeable to  Cellulitis of left foot: acute illness or injury  Amount and/or Complexity of Data Reviewed  External Data Reviewed: labs and notes  Labs: ordered  Radiology: ordered  Decision-making details documented in ED Course  Risk  Prescription drug management  Decision regarding hospitalization            Disposition  Final diagnoses:   Cellulitis of left foot     Time reflects when diagnosis was documented in both MDM as applicable and the Disposition within this note     Time User Action Codes Description Comment "3/27/2023  8:25 PM Tylor Garcia Add [[id number]] Cellulitis of left foot       ED Disposition     ED Disposition   Discharge    Condition   Stable    Date/Time   Mon Mar 27, 2023  8:25 PM    Comment   90Brendan Clint veronica discharge to home/self care  Follow-up Information     Follow up With Specialties Details Why Joocamilo Saldivarleonid Jacome [address], DPM Podiatry   [address]  [phone]            Discharge Medication List as of 3/27/2023  8:26 PM      START taking these medications    Details   cephalexin (KEFLEX) 500 mg capsule Take 1 capsule (500 mg total) by mouth every 12 (twelve) hours for 7 days, Starting Mon 3/27/2023, Until Mon 4/3/2023, Normal         CONTINUE these medications which have NOT CHANGED    Details   aspirin (ECOTRIN LOW STRENGTH) 81 mg EC tablet Take 81 mg by mouth daily Resume on 8/14  , Historical Med      atorvastatin (LIPITOR) 40 mg tablet TAKE 1 TABLET BY MOUTH EVERY DAY WITH DINNER, Normal      Blood Glucose Monitoring Suppl (True Metrix Meter) w/Device KIT Use to test blood sugars 3 times daily, Normal      Blood Pressure Monitoring (BLOOD PRESSURE CUFF) MISC Use to check blood pressure before taking blood pressure medication and 1 hour after and follow instructions provided in discharge instructions based on the readings  , Print      calcium acetate (CALPHRON) 667 mg 3 tablets 3x per day, Historical Med      D3-50 1 25 MG (02329 UT) capsule TAKE 1 CAPSULE BY MOUTH ONE TIME PER WEEK, Normal      divalproex sodium (DEPAKOTE SPRINKLE) 125 MG capsule TAKE 2 CAPSULES (250 MG TOTAL) BY MOUTH EVERY 12 (TWELVE) HOURS, Starting Thu 2/16/2023, Normal      glucose blood (True Metrix Blood Glucose Test) test strip Use 1 each 3 (three) times a day Use as instructed, Starting Wed 3/15/2023, Normal      Insulin Pen Needle (BD Pen Needle Adina U/F) 32G X 4 MM MISC Use 4 times daily, Normal      Insulin Syringe-Needle U-100 (B-D INS SYRINGE 0 5CC/30GX1/2\") " "30G X 1/2\" 0 5 ML MISC Inject once daily, Normal      !! Lancets Ultra Thin 30G MISC Use 3 times a day, Normal      metoprolol succinate (TOPROL-XL) 50 mg 24 hr tablet Take 1 tablet (50 mg total) by mouth 2 (two) times a day, Starting Fri 3/17/2023, Normal      !! ONETOUCH DELICA LANCETS 86M MISC by Does not apply route 3 (three) times a day, Starting Tue 11/27/2018, Normal      prasugrel (EFFIENT) tablet Take 1 tablet (5 mg total) by mouth daily, Starting Tue 2/14/2023, Normal       !! - Potential duplicate medications found  Please discuss with provider  No discharge procedures on file      PDMP Review       Value Time User    PDMP Reviewed  Yes 11/12/2022  6:19 AM Sabi Canales MD          ED Provider  Electronically Signed by           Henry Hewitt DO  04/01/23 1801    "

## 2023-03-28 ENCOUNTER — CLINICAL SUPPORT (OUTPATIENT)
Dept: CARDIAC REHAB | Facility: CLINIC | Age: 63
End: 2023-03-28

## 2023-03-28 ENCOUNTER — VBI (OUTPATIENT)
Dept: INTERNAL MEDICINE CLINIC | Facility: CLINIC | Age: 63
End: 2023-03-28

## 2023-03-28 DIAGNOSIS — I21.4 TYPE 1 NON-ST ELEVATION MYOCARDIAL INFARCTION (NSTEMI) (HCC): Primary | ICD-10-CM

## 2023-03-28 NOTE — TELEPHONE ENCOUNTER
Josee Mejias    ED Visit Information     Ed visit date: 3-27-23  Diagnosis Description: Cellulitis of left foot  In Network? Yes [facility]  Discharge status: Home  Discharged with meds ? Yes  Number of ED visits to date: 6  ED Severity:N/A     Outreach Information    Outreach successful: Yes 1  Date letter mailed:0  Date Finalized:3-28-23    Care Coordination    Follow up appointment with specialilty: yes Podiatry 3-29-23   Transportation issues ? No    Value [employer] type: 7 Day Outreach  Emergent necessity warranted by diagnosis: No  [facility] PCP: Yes  Transportation: Friend/Family Transport  Called PCP first?: No  Feels able to call PCP for urgent problems ?: Yes  Understands what emergencies can be handled by PCP ?: Yes  Ever any problems getting appointment with PCP for minor emergency/urgency problems?: No  Practice Contacted Patient ?: No  Pt had ED follow up with pcp/staff ?: No    Seen for follow-up out of network ?: No  Reason Patient went to ED instead of Urgent Care or PCP?: Perceived Severity of Illness  Urgent care Education?: No  03/28/2023 09:36 AM EDT by Iris Cardenas 03/28/2023 09:36 AM EDT by Iris Cardenas  [facility] [address] Robert Applebaum MD [address]) [phone] (Mobile)    Personal communication with patients wife regarding recent ED visit on 3-27-23  Patients wife stated that patient is a little better   Patient scheduled to see podiatry on  3-29-23

## 2023-03-28 NOTE — PROGRESS NOTES
Cardiac Rehabilitation Plan of Care   60 Day Reassessment          Today's date: 3/28/2023   # of Exercise Sessions Completed: 6  Patient name: Carla Garcia      : 1960  Age: 61 y o  MRN: [id number]  Referring Physician: No ref  provider found  Cardiologist: Meron Hope MD  Provider: [facility]  Clinician: Jan De La Cruz, MS, CEP    Dx:   Encounter Diagnosis   Name Primary? • Type 1 non-ST elevation myocardial infarction (NSTEMI) ([facility] [address] ) Yes     Date of onset: 2023      SUMMARY OF PROGRESS:  Hamilton Mendosa is non compliant attending rehab 1-2x/wk  Hamilton Mendosa is attending S/P coronary artery stent placement  He tolerates 18-20 mins at 1 5 - 2 METs  Hamilton Mendosa is very limited with his exercise tolerance and refuses to do more exercise with frequent rest most days  He reports his biggest limitations is pain however cannot always distinguish the pain to us  Hamilton Mendosa has reported pain in his back and has been urged to see his PCP for referral to pain management  Hamilton Mendosa also frequently goes to the ED for pain of cellulitis in his foot  He most recently was in yesterday 3/27  Hamilton Mendosa and his Wife were urged to follow up with PCP and podiatry  Resting BP 98/60 - 120/72 with normal hemodynamic response to exercise reaching 114/60- 132/78  NSR on tele with no ectopy observed  RHR 73 - 82 with blunted heart rate response to exercise reaching 85 - 98  No home exercise  No cardiac complaints however has complained of CP at [facility]  He is encouraged to follow a heart healthy diet however it seems he does not eat even regularly  Hamilton Mendosa arrived at rehab today without eating anything all day  He was given a banana and PB crackers due to his  His weight is unchanged at 220 lbs  The patient has T2D, on Insulin   The patient is a non-smoker  PHQ-9 and LEYLA-7 were not reassessed  Most recent assessment found PHQ-9 at a 18 suggesting 15-19 = Moderately Severe Depression and LEYLA-7 at a 16 suggesting 15-21 = Severe anxiety   When addressed, the patient admits to depression/anxiety  They are clearly stressed with daily tasks as well as dealing with pain  Although Kayy Coulter gets visual support from his wife, there is extra help needed due to consistently missing appointments and not eating  This neglect is making it harder for him to recover and may need extra guidance like a   His exercise program will be progressed as tolerated to maintain RPE 4-6  The patient has the following personal goals he hopes to achieved by discharge:  improve heart health, increase strength, weight loss, start home exercise program, and start using Independent IP 33 program  They will continue to be educated on lifestyle modifications and encouraged to supplement with a home exercise program to reach the following goals in the next 30 days: discuss pain management with PCP  Will inform PCP of current status      Medication compliance: Yes   Comments: Pt reports to be compliant with medications  Fall Risk: High   Comments: unsteady gait, needs to hold onto something while walking    EKG Interpretation: NSR       EXERCISE ASSESSMENT and PLAN    Exercise Prescription:      Frequency: 3 days/week   Supplement with home exercise 2+ days/wk as tolerated       Minutes: 18-20  *Decreased*        METS: 1 9-2 5     HR: 85 - 98   RPE: 5-7         Modalities: NuStep and Recumbent bike      30 Day Goals for Exercise Progression:    Frequency: 3 days/week of cardiac rehab       Supplement with home exercise 2+ days/wk as tolerated    Minutes: 20-30                            >150 mins/wk of moderate intensity exercise   METS: 2 0-3 0   HR:    RPE: 4-6   Modalities: NuStep, Recumbent bike and Room walking    Strength training:  will not be added until he tolerates exercise better   Modalities: Lateral Raise, Arm Curl, Sit to Stands, [employer] and Shoulder Shrugs    Home Exercise: none    Goals: 10% improvement in functional capacity - based on max METs achieved in fitness assessment, improved DASI score by 10%, Increase in exercise capacity by 40% - based on peak METs tolerated in cardiac rehab exercise session, Exercise 5 days/wk, >150mins/wk of moderate intensity exercise, Resume ADLs with increased strength, Attend Rehab regularly, Decrease sitting time and start a home exercise program    Progression Toward Goals:  Pt has not made progress toward the following goals: not tolerating exercise progress  , Patient will contact PCP regarding pain in the next 30 days, Will continue to educate and progress as tolerated  Education: benefit of exercise for CAD risk factors, RPE scale and class: Risk Factors for Heart Disease   Plan:education on home exercise guidelines and home exercise 30+ mins 2 days opposite CR  Readiness to change: Contemplation:  (Acknowledging that there is a problem but not yet ready or sure of wanting to make a change)      NUTRITION ASSESSMENT AND PLAN    Weight control:    Starting weight: 220   Current weight:   220  Waist circumference:    Startin   Current:      Diabetes: T2D    Lipid management: Discussed diet and lipid management    Goals:reduced waist circumference <40 inches (M), 2 5-5%  wt loss, choose lean meat (93-95%), increase intake of meatless meals, use low fat dairy, reduce cheese intake or use reduced-fat, eat 3 or more servings of whole grains a day, Eat 4-5 cups of fruits and vegetables daily, choose healthy snacks: light popcorn, plain pretzels, Increase intake of nuts and seeds and daily saturated fat intake <7%/13g    Measurable goals were based Rate Your Plate Dietary Self-Assessment  These are the areas in which the patient could score higher on the assessment  Goals include recommendations for a heart healthy diet based on American Heart Association      Progression Toward Goals: Pt has not made progress toward the following goals: changes in heart health, not eating  , Will continue to educate and progress as tolerated  Education: heart healthy eating  low sodium diet  hydration  wt  loss   Plan: Education class: Reading Food Labels, Education Class: Heart Healthy Eating, switch to low fat cheeses, replace refined grain bread with whole grain bread, replace unhealthy snacks with fruits & vegs, avoid processed foods and keep added daily sugar <25g/day  Readiness to change: Action:  (Changing behavior)      PSYCHOSOCIAL ASSESSMENT AND PLAN    Emotional:  Depression assessment:  PHQ-9 = 18  15-19 = Moderately Severe Depression            Anxiety measure:  LEYLA-7 = 16  10-14 = Moderate anxiety  Self-reported stress level:  4  Social support: Very Good    Goals:  Reduce perceived stress to 1-3/10, improved [facility] QOL < 27, PHQ-9 - reduced severity by one level, Feelings in [facility] Score < 3, Physical Fitness in [facility] Score < 3, Social Support in [facility] Score < 3, Daily Activity in [facility] Score < 3, Social Activities in [facility] Score < 3, Pain in [facility] Score < 3, Overall Health in [facility] Score < 3, Quality of Life in [facility] Score < 3 , Change in Health in [place] Score < 3 , Increased interest in doing things and improved positive thoughts of well being    Progression Toward Goals: Patient will start using Gro 33 program in the next 30 days, Will continue to educate and progress as tolerated  , no update    Education: signs/sxs of depression, benefits of a positive support system and stress management techniques  Plan: Class: Stress and Your Health, Class: Relaxation, PHQ-9 >5 will refer to MD, Refer to Jazmin James, Practice relaxation techniques, Enjoy a hobby and Keep a positive mindset  Readiness to change: Pre-Contemplation:   (Not yet acknowledging that there is a problem behavior that needs to change)      OTHER CORE COMPONENTS     Tobacco:   Social History     Tobacco Use   Smoking Status Never   Smokeless Tobacco Never       Tobacco Use Intervention:   N/A:  Patient is a non-smoker Anginal Symptoms:  chest pressure, excessive fatigue and lightheadedness   NTG use: No prescription    Blood pressure:    Restin/60 - 120/72    Exercise: 114/60 - 132/78    Goals: consistent BP < 130/80, reduced dietary sodium <2300mg, moderate intensity exercise >150 mins/wk, medication compliance and reduced angina     Progression Toward Goals: Reviewed Pt goals and determined plan of care    Education:  understanding high blood pressure and it's relationship to CAD and low sodium diet and HTN  Plan: Class: Understanding Heart Disease, Class: Common Heart Medications, Avoid Processed foods, engage in regular exercise and check labels for sodium content  Readiness to change: Preparation:  (Getting ready to change)

## 2023-03-29 ENCOUNTER — OFFICE VISIT (OUTPATIENT)
Dept: PODIATRY | Facility: CLINIC | Age: 63
End: 2023-03-29

## 2023-03-29 VITALS
HEIGHT: 69 IN | BODY MASS INDEX: 30.96 KG/M2 | DIASTOLIC BLOOD PRESSURE: 65 MMHG | HEART RATE: 76 BPM | WEIGHT: 209 LBS | SYSTOLIC BLOOD PRESSURE: 126 MMHG

## 2023-03-29 DIAGNOSIS — E11.42 DIABETIC POLYNEUROPATHY ASSOCIATED WITH TYPE 2 DIABETES MELLITUS (HCC): ICD-10-CM

## 2023-03-29 DIAGNOSIS — L08.89 PITTED KERATOLYSIS: Primary | ICD-10-CM

## 2023-03-29 NOTE — PROGRESS NOTES
PATIENT:  Elina Martins    1960      ASSESSMENT:     1  Pitted keratolysis  mupirocin (BACTROBAN) 2 % ointment      2  Diabetic polyneuropathy associated with type 2 diabetes mellitus ([facility] )            PLAN:  1  Reviewed medical records  Reviewed the note from Dr Nancy Palma and ED  Patient was counseled on the condition and diagnosis  Educated disease prevention and risks related to diabetes  2  Skin condition is more consistent with pitted keratolysis / mild skin fissures  Will use Bactroban tid  3  Monitor for any worsening  Discussed proper skin care related to his condition  4  RA in 2 weeks  Subjective:      HPI:   The patients presents for pain in left foot  He has been dealing with cellulitis of foot  Right foot pain resolved  He started pain around left heel  No swelling in his feet  He is on oral antibiotics  BS is under control  The following portions of the patient's history were reviewed and updated as appropriate: allergies, current medications, past family history, past medical history, past social history, past surgical history and problem list   All pertinent labs and images were reviewed      Past Medical History  Past Medical History:   Diagnosis Date   • Cerebrovascular accident (CVA) due to thrombosis of left middle cerebral artery ([facility] ) 07/29/2018   • Chronic kidney disease    • Diabetes mellitus ([place] [address] )    • Dialysis patient [facility])     M-W-F   • GERD (gastroesophageal reflux disease)    • Hypercholesteremia    • Hyperlipidemia    • Hypertension    • 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   • Neuropathy    • Obesity    • Osteomyelitis ([facility] )     last assessed 11/4/16   • PVC's (premature ventricular contractions)     sees cardiology Dr Sav camargo   • Stroke [facility])     last weeof July 2018 [address] [facility]   • TIA (transient ischemic attack) 10/28/2018       Past Surgical History  Past Surgical History:   Procedure Laterality Date   • ABDOMINAL "SURGERY     • CARDIAC CATHETERIZATION N/A 5/2/2022    Procedure: Cardiac Coronary Angiogram;  Surgeon: Berenice Foley MD;  [facility]; Service: Cardiology   • CARDIAC CATHETERIZATION N/A 5/2/2022    Procedure: Cardiac pci;  Surgeon: Berenice Foley MD;  [facility] CARD[facility]; Service: Cardiology   • CARDIAC CATHETERIZATION  2/1/2023    Procedure: Cardiac catheterization;  Surgeon: Berenice Foley MD;  [facility]: [facility] CARDIAC [facility]; Service: Cardiology   • CARDIAC CATHETERIZATION N/A 2/1/2023    Procedure: Cardiac pci;  Surgeon: Berenice Foley MD;  [facility]: [facility] CARDI[facility]; Service: Cardiology   • CARDIAC CATHETERIZATION N/A 2/1/2023    Procedure: Cardiac Coronary Angiogram;  Surgeon: Berenice Foley MD;  [facility]: [facility] CARDI[facility]; Service: Cardiology   • CARDIAC CATHETERIZATION N/A 2/1/2023    Procedure: Cardiac other-IVUS;  Surgeon: Berenice Foley MD;  Location: [facility] CARDIAC [facility]; Service: Cardiology   • CHOLECYSTECTOMY      Percutaneous   • COLONOSCOPY     • CYSTOSCOPY     • OTHER SURGICAL HISTORY      \"stimulator to control bowel movements\"   • PA ESOPHAGOGASTRODUODENOSCOPY TRANSORAL DIAGNOSTIC N/A 9/27/2016    Procedure: ESOPHAGOGASTRODUODENOSCOPY (EGD); Surgeon: Jaleel Clark MD;  Location: [facility] GI LAB; Service: Gastroenterology   • PA LAPAROSCOPY SURG CHOLECYSTECTOMY N/A 2/29/2016    Procedure: LAPAROSCOPIC CHOLECYSTECTOMY ;  Surgeon: Hamilton Cordero DO;  Location: [facility] Main OR;  Service: General   • ROTATOR CUFF REPAIR Right    • TOE AMPUTATION Right 10/28/2016    Procedure: 3RD TOE AMPUTATION ;  Surgeon: Rishi Mathews DPM;  Location: [facility] Main OR;  Service:         Allergies:  Patient has no known allergies      Medications:  Current Outpatient Medications   Medication Sig Dispense Refill   • aspirin (ECOTRIN LOW STRENGTH) 81 mg EC tablet Take 81 mg by mouth daily Resume on 8/14       • atorvastatin (LIPITOR) 40 mg tablet TAKE 1 TABLET BY MOUTH EVERY DAY WITH DINNER 90 tablet " "1   • Blood Glucose Monitoring Suppl (True Metrix Meter) w/Device KIT Use to test blood sugars 3 times daily 1 kit 0   • Blood Pressure Monitoring (BLOOD PRESSURE CUFF) MISC Use to check blood pressure before taking blood pressure medication and 1 hour after and follow instructions provided in discharge instructions based on the readings  1 each 0   • calcium acetate (CALPHRON) 667 mg 3 tablets 3x per day     • cephalexin (KEFLEX) 500 mg capsule Take 1 capsule (500 mg total) by mouth every 12 (twelve) hours for 7 days 14 capsule 0   • D3-50 1 25 MG (71185 UT) capsule TAKE 1 CAPSULE BY MOUTH ONE TIME PER WEEK 12 capsule 2   • divalproex sodium (DEPAKOTE SPRINKLE) 125 MG capsule TAKE 2 CAPSULES (250 MG TOTAL) BY MOUTH EVERY 12 (TWELVE) HOURS 360 capsule 1   • glucose blood (True Metrix Blood Glucose Test) test strip Use 1 each 3 (three) times a day Use as instructed 300 strip 1   • Insulin Pen Needle (BD Pen Needle Adina U/F) 32G X 4 MM MISC Use 4 times daily 400 each 1   • Insulin Syringe-Needle U-100 (B-D INS SYRINGE 0 5CC/30GX1/2\") 30G X 1/2\" 0 5 ML MISC Inject once daily 100 each 1   • metoprolol succinate (TOPROL-XL) 50 mg 24 hr tablet Take 1 tablet (50 mg total) by mouth 2 (two) times a day 180 tablet 3   • mupirocin (BACTROBAN) 2 % ointment Apply topically 3 (three) times a day 30 g 5   • ONETOUCH DELICA LANCETS 71U MISC by Does not apply route 3 (three) times a day 270 each 1   • prasugrel (EFFIENT) tablet Take 1 tablet (5 mg total) by mouth daily 90 tablet 3   • Lancets Ultra Thin 30G MISC Use 3 times a day 300 each 0     No current facility-administered medications for this visit         Social History:  Social History     Socioeconomic History   • Marital status: /Civil Union     Spouse name: None   • Number of children: None   • Years of education: None   • Highest education level: Not asked   Occupational History   • Occupation: disabled   Tobacco Use   • Smoking status: Never   • Smokeless tobacco: " "Never   Vaping Use   • Vaping Use: Never used   Substance and Sexual Activity   • Alcohol use: Not Currently   • Drug use: No   • Sexual activity: Not Currently   Other Topics Concern   • None   Social History Narrative    Daily caffeine consumption 2-3 servings a day     Social Determinants of Health     Financial Resource Strain: Not on file   Food Insecurity: No Food Insecurity   • Worried About Running Out of Food in the Last Year: Never true   • Ran Out of Food in the Last Year: Never true   Transportation Needs: No Transportation Needs   • Lack of Transportation (Medical): No   • Lack of Transportation (Non-Medical): No   Physical Activity: Not on file   Stress: Not on file   Social Connections: Not on file   Intimate Partner Violence: Not on file   Housing Stability: Low Risk    • Unable to Pay for Housing in the Last Year: No   • Number of Places Lived in the Last Year: 1   • Unstable Housing in the Last Year: No        Review of Systems   Constitutional: Negative for chills and fever  Respiratory: Negative for cough and shortness of breath  Cardiovascular: Negative for chest pain  Gastrointestinal: Negative for constipation, diarrhea, nausea and vomiting  Neurological: Positive for numbness  Objective:      /65 (BP Location: Left arm, Patient Position: Sitting, Cuff Size: Standard)   Pulse 76   Ht 5' 9\" (1 753 m)   Wt 94 8 kg (209 lb)   BMI 30 86 kg/m²          Physical Exam  Vitals reviewed  Constitutional:       General: He is not in acute distress  Appearance: He is well-developed  He is not toxic-appearing or diaphoretic  Cardiovascular:      Rate and Rhythm: Normal rate and regular rhythm  Pulses: no weak pulses          Dorsalis pedis pulses are 1+ on the right side and 1+ on the left side  Posterior tibial pulses are 0 on the right side and 0 on the left side  Pulmonary:      Effort: Pulmonary effort is normal  No respiratory distress   " Musculoskeletal:         General: Deformity present  No tenderness  Right foot: No foot drop  Left foot: No foot drop  Comments: Hammertoe deformity noted  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  Feet:      Right foot:      Skin integrity: Dry skin present  No ulcer, skin breakdown, erythema, warmth or callus  Left foot:      Skin integrity: Dry skin present  No ulcer, skin breakdown, erythema, warmth or callus  Skin:     General: Skin is warm  Capillary Refill: Capillary refill takes less than 2 seconds  Coloration: Skin is not cyanotic or mottled  Findings: No ecchymosis  Nails: There is no clubbing  Comments: Thick mycotic nails X 7 with discoloration and onycholysis  He has class finding with previous toe amputation  Mild maceration / pitting of skin in left heel  Mild fissures noted  No obvious cellulitis  Neurological:      General: No focal deficit present  Mental Status: He is alert and oriented to person, place, and time  Cranial Nerves: No cranial nerve deficit  Sensory: Sensory deficit present  Motor: No weakness  Psychiatric:         Mood and Affect: Mood normal          Behavior: Behavior normal          Thought Content:  Thought content normal          Judgment: Judgment normal

## 2023-03-30 ENCOUNTER — APPOINTMENT (OUTPATIENT)
Dept: CARDIAC REHAB | Facility: CLINIC | Age: 63
End: 2023-03-30

## 2023-03-30 ENCOUNTER — TELEPHONE (OUTPATIENT)
Dept: PODIATRY | Facility: CLINIC | Age: 63
End: 2023-03-30

## 2023-03-30 NOTE — TELEPHONE ENCOUNTER
Caller: Nakul Caraballo (spouse)    Doctor/Office: Dr Kimber De Anda    #: [phone]    Escalation: Medication Patient Ban Betty  requesting a new script for a different cream be called in as the one currently in isn't covered by their insurance  Please call and advise  Thank you

## 2023-03-30 NOTE — TELEPHONE ENCOUNTER
Pre verbal instruction from [address] to patient wife that ointment was sent to pharmacy to replace original script

## 2023-03-31 NOTE — PROGRESS NOTES
Per verbal from   Spoke to patients wife   Maryam Dobbs wants patient to ask pharmacy for a generic alternative to the ointment that would be covered by insurance

## 2023-04-18 PROBLEM — E16.2 HYPOGLYCEMIA: Status: RESOLVED | Noted: 2018-10-28 | Resolved: 2023-04-18

## 2023-04-21 ENCOUNTER — TELEPHONE (OUTPATIENT)
Dept: PODIATRY | Facility: CLINIC | Age: 63
End: 2023-04-21

## 2023-04-24 ENCOUNTER — HOSPITAL ENCOUNTER (OUTPATIENT)
Dept: NON INVASIVE DIAGNOSTICS | Facility: CLINIC | Age: 63
Discharge: HOME | End: 2023-04-24

## 2023-04-24 ENCOUNTER — DOCUMENTATION (OUTPATIENT)
Dept: CARDIAC REHAB | Facility: CLINIC | Age: 63
End: 2023-04-24

## 2023-04-24 ENCOUNTER — PATIENT MESSAGE (OUTPATIENT)
Dept: PSYCHIATRY | Facility: CLINIC | Age: 63
End: 2023-04-24

## 2023-04-24 VITALS
SYSTOLIC BLOOD PRESSURE: 110 MMHG | WEIGHT: 211 LBS | BODY MASS INDEX: 31.25 KG/M2 | HEIGHT: 69 IN | DIASTOLIC BLOOD PRESSURE: 70 MMHG | HEART RATE: 70 BPM

## 2023-04-24 DIAGNOSIS — I25.5 ISCHEMIC CARDIOMYOPATHY: ICD-10-CM

## 2023-04-24 LAB
AORTIC ROOT: 3.4 CM
AORTIC VALVE MEAN VELOCITY: 15.5 M/S
AV AREA BY CONTINUOUS VTI: 1.8 CM2
AV AREA PEAK VELOCITY: 1.7 CM2
AV LVOT MEAN GRADIENT: 1 MMHG
AV LVOT PEAK GRADIENT: 3 MMHG
AV MEAN GRADIENT: 11 MMHG
AV PEAK GRADIENT: 21 MMHG
AV REGURGITATION PRESSURE HALF TIME: 448 MS
AV VALVE AREA: 1.81 CM2
AV VELOCITY RATIO: 0.35
DOP CALC AO PEAK VEL: 2.31 M/S
DOP CALC AO VTI: 51.8 CM
DOP CALC LVOT AREA: 4.91 CM2
DOP CALC LVOT DIAMETER: 2.5 CM
DOP CALC LVOT PEAK VEL VTI: 19.13 CM
DOP CALC LVOT PEAK VEL: 0.82 M/S
DOP CALC LVOT STROKE INDEX: 45.5 ML/M2
DOP CALC LVOT STROKE VOLUME: 93.86
FRACTIONAL SHORTENING: 15 (ref 28–44)
INTERVENTRICULAR SEPTUM IN DIASTOLE (PARASTERNAL SHORT AXIS VIEW): 1.1 CM
INTERVENTRICULAR SEPTUM: 1.1 CM (ref 0.6–1.1)
LAAS-AP2: 21.1 CM2
LAAS-AP4: 30.3 CM2
LEFT ATRIUM AREA SYSTOLE SINGLE PLANE A4C: 28.3 CM2
LEFT ATRIUM SIZE: 4.8 CM
LEFT INTERNAL DIMENSION IN SYSTOLE: 5.2 CM (ref 2.1–4)
LEFT VENTRICULAR INTERNAL DIMENSION IN DIASTOLE: 6.1 CM (ref 3.5–6)
LEFT VENTRICULAR POSTERIOR WALL IN END DIASTOLE: 1.1 CM
LEFT VENTRICULAR STROKE VOLUME: 54 ML
LVSV (TEICH): 54 ML
MV E'TISSUE VEL-SEP: 6 CM/S
RA PRESSURE ESTIMATED: 3 MMHG
RIGHT ATRIUM AREA SYSTOLE A4C: 18.9 CM2
RIGHT VENTRICLE ID DIMENSION: 4.1 CM
RV PSP: 43 MMHG
SL CV AV DECELERATION TIME RETROGRADE: 1545 MS
SL CV AV PEAK GRADIENT RETROGRADE: 40 MMHG
SL CV LEFT ATRIUM LENGTH A2C: 4.7 CM
SL CV LV EF: 35
SL CV PED ECHO LEFT VENTRICLE DIASTOLIC VOLUME (MOD BIPLANE) 2D: 184 ML
SL CV PED ECHO LEFT VENTRICLE SYSTOLIC VOLUME (MOD BIPLANE) 2D: 129 ML
TR MAX PG: 40 MMHG
TR PEAK VELOCITY: 3.2 M/S
TRICUSPID ANNULAR PLANE SYSTOLIC EXCURSION: 2.3 CM
TRICUSPID VALVE PEAK REGURGITATION VELOCITY: 3.17 M/S

## 2023-04-24 RX ADMIN — PERFLUTREN 0.2 ML/MIN: 6.52 INJECTION, SUSPENSION INTRAVENOUS at 08:49

## 2023-04-24 NOTE — PROGRESS NOTES
Ramona Szymanski      Dear Dr Bonnie Gordon,    Thank you for referring your patient to our cardiac  rehabilitation program  The patient has completed 12 visits  However, rehab is being discontinued at this time due to:   Noncompliance: The patient has not regularly attended his/her rehab sessions  Last session attended was on 3/28/23  Patient has not been seen since his last progress note  Called patient today and spoke with his wife about no call/no show and patient will be discharged  Please contact us at [phone] if you have any questions about this patent’s case  Thank you for your continued support of cardiac rehabilitation

## 2023-04-25 ENCOUNTER — TELEPHONE (OUTPATIENT)
Dept: PODIATRY | Facility: CLINIC | Age: 63
End: 2023-04-25

## 2023-04-25 ENCOUNTER — OFFICE VISIT (OUTPATIENT)
Dept: PODIATRY | Facility: CLINIC | Age: 63
End: 2023-04-25

## 2023-04-25 VITALS — HEIGHT: 69 IN | BODY MASS INDEX: 31.25 KG/M2 | WEIGHT: 211 LBS

## 2023-04-25 DIAGNOSIS — L97.421 ULCER OF LEFT HEEL, LIMITED TO BREAKDOWN OF SKIN (HCC): Primary | ICD-10-CM

## 2023-04-25 DIAGNOSIS — E11.42 DIABETIC POLYNEUROPATHY ASSOCIATED WITH TYPE 2 DIABETES MELLITUS (HCC): ICD-10-CM

## 2023-04-25 NOTE — TELEPHONE ENCOUNTER
Caller: Ilene Neri (spouse)    Doctor/Office: Dr Reyes Armour    CB#: [phone] ([facility])    Escalation: Last office visit Calling on behalf of patient Geroldine Duverney  Requesting office notes and any other necessary paperwork be faxed to [employer] for diabetic shoes  Fax is [phone]  Thank you

## 2023-04-25 NOTE — PROGRESS NOTES
PATIENT:  Carla Garcia    1960      ASSESSMENT:     1  Ulcer of left heel, limited to breakdown of skin (HCC)  Heel Relief Shoe      2  Diabetic polyneuropathy associated with type 2 diabetes mellitus ([facility] )            PLAN:  1  Reviewed medical records and lab from 4/18  HbA1c was 4 9  Patient was counseled on the condition and diagnosis  Educated disease prevention and risks related to diabetes  2  D/C Bactroban  Rx Dermagren gauze and DSD daily  Sent him for Globoped shoe for off-loading left heel  3  Discussed proper off-loading of left heel  4  RA in 2 weeks  I have spent a total time of 20 minutes on 04/25/23 in caring for this patient including Instructions for management, Patient and family education, Risk factor reductions, Counseling / Coordination of care and Documenting in the medical record  Subjective:      HPI:   The patients presents for worsening of fissure on left heel  His condition was not getting better  No significant pain  BS under control  The following portions of the patient's history were reviewed and updated as appropriate: allergies, current medications, past family history, past medical history, past social history, past surgical history and problem list   All pertinent labs and images were reviewed      Past Medical History  Past Medical History:   Diagnosis Date   • Cerebrovascular accident (CVA) due to thrombosis of left middle cerebral artery ([place] [address] ) 07/29/2018   • Chronic kidney disease    • Diabetes mellitus ([place] [address] )    • Dialysis patient [facility])     M-W-F   • GERD (gastroesophageal reflux disease)    • Hypercholesteremia    • Hyperlipidemia    • Hypertension    • 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   • Neuropathy    • Obesity    • Osteomyelitis ([place] [address] )     last assessed 11/4/16   • PVC's (premature ventricular contractions)     sees cardiology Dr Nessa camargo   • Stroke [facility])     last weeof July 2018 [address] [facility]   • "TIA (transient ischemic attack) 10/28/2018       Past Surgical History  Past Surgical History:   Procedure Laterality Date   • ABDOMINAL SURGERY     • CARDIAC CATHETERIZATION N/A 5/2/2022    Procedure: Cardiac Coronary Angiogram;  Surgeon: Benjamin Fong MD;  [facility]; Service: Cardiology   • CARDIAC CATHETERIZATION N/A 5/2/2022    Procedure: Cardiac pci;  Surgeon: Benjamin Fong MD;  [facility]; Service: Cardiology   • CARDIAC CATHETERIZATION  2/1/2023    Procedure: Cardiac catheterization;  Surgeon: Benjamin Fong MD;  [facility]; Service: Cardiology   • CARDIAC CATHETERIZATION N/A 2/1/2023    Procedure: Cardiac pci;  Surgeon: Benjamin Fong MD;  [facility]; Service: Cardiology   • CARDIAC CATHETERIZATION N/A 2/1/2023    Procedure: Cardiac Coronary Angiogram;  Surgeon: Benjamin Fong MD;  [facility]; Service: Cardiology   • CARDIAC CATHETERIZATION N/A 2/1/2023    Procedure: Cardiac other-IVUS;  Surgeon: Benjamin Fong MD;  [facility]; Service: Cardiology   • CHOLECYSTECTOMY      Percutaneous   • COLONOSCOPY     • CYSTOSCOPY     • OTHER SURGICAL HISTORY      \"stimulator to control bowel movements\"   • TX ESOPHAGOGASTRODUODENOSCOPY TRANSORAL DIAGNOSTIC N/A 9/27/2016    Procedure: ESOPHAGOGASTRODUODENOSCOPY (EGD); Surgeon: Toyin Alaniz MD;  Location: [facility] GI LAB; Service: Gastroenterology   • TX LAPAROSCOPY SURG CHOLECYSTECTOMY N/A 2/29/2016    Procedure: LAPAROSCOPIC CHOLECYSTECTOMY ;  Surgeon: Yadiel Humphreys DO;  Location: [facility] Main OR;  Service: General   • ROTATOR CUFF REPAIR Right    • TOE AMPUTATION Right 10/28/2016    Procedure: 3RD TOE AMPUTATION ;  Surgeon: Albania Gonzalez DPM;  Location: [facility];  Service:         Allergies:  Patient has no known allergies      Medications:  Current Outpatient Medications   Medication Sig Dispense Refill   • aspirin (ECOTRIN LOW STRENGTH) 81 mg EC tablet " "Take 81 mg by mouth daily Resume on 8/14       • atorvastatin (LIPITOR) 40 mg tablet TAKE 1 TABLET BY MOUTH EVERY DAY WITH DINNER 90 tablet 1   • Blood Glucose Monitoring Suppl (True Metrix Meter) w/Device KIT Use to test blood sugars 3 times daily 1 kit 0   • Blood Pressure Monitoring (BLOOD PRESSURE CUFF) MISC Use to check blood pressure before taking blood pressure medication and 1 hour after and follow instructions provided in discharge instructions based on the readings  1 each 0   • calcium acetate (CALPHRON) 667 mg 3 tablets 3x per day     • D3-50 1 25 MG (81870 UT) capsule TAKE 1 CAPSULE BY MOUTH ONE TIME PER WEEK 12 capsule 2   • divalproex sodium (DEPAKOTE SPRINKLE) 125 MG capsule TAKE 2 CAPSULES (250 MG TOTAL) BY MOUTH EVERY 12 (TWELVE) HOURS 360 capsule 1   • glucose blood (True Metrix Blood Glucose Test) test strip Use 1 each 3 (three) times a day Use as instructed 300 strip 1   • Insulin Pen Needle (BD Pen Needle Adina U/F) 32G X 4 MM MISC Use 4 times daily 400 each 1   • Insulin Syringe-Needle U-100 (B-D INS SYRINGE 0 5CC/30GX1/2\") 30G X 1/2\" 0 5 ML MISC Inject once daily 100 each 1   • metoprolol succinate (TOPROL-XL) 50 mg 24 hr tablet Take 1 tablet (50 mg total) by mouth 2 (two) times a day 180 tablet 3   • mupirocin (BACTROBAN) 2 % ointment Apply topically 3 (three) times a day 30 g 5   • ONETOUCH DELICA LANCETS 24Q MISC by Does not apply route 3 (three) times a day 270 each 1   • prasugrel (EFFIENT) tablet Take 1 tablet (5 mg total) by mouth daily 90 tablet 3     No current facility-administered medications for this visit         Social History:  Social History     Socioeconomic History   • Marital status: /Civil Union     Spouse name: None   • Number of children: None   • Years of education: None   • Highest education level: Not asked   Occupational History   • Occupation: disabled   Tobacco Use   • Smoking status: Never   • Smokeless tobacco: Never   Vaping Use   • Vaping Use: Never used " "  Substance and Sexual Activity   • Alcohol use: Not Currently   • Drug use: No   • Sexual activity: Not Currently   Other Topics Concern   • None   Social History Narrative    Daily caffeine consumption 2-3 servings a day     Social Determinants of Health     Financial Resource Strain: Not on file   Food Insecurity: No Food Insecurity   • Worried About Running Out of Food in the Last Year: Never true   • Ran Out of Food in the Last Year: Never true   Transportation Needs: No Transportation Needs   • Lack of Transportation (Medical): No   • Lack of Transportation (Non-Medical): No   Physical Activity: Not on file   Stress: Not on file   Social Connections: Not on file   Intimate Partner Violence: Not on file   Housing Stability: Low Risk    • Unable to Pay for Housing in the Last Year: No   • Number of Places Lived in the Last Year: 1   • Unstable Housing in the Last Year: No        Review of Systems   Constitutional: Negative for chills and fever  Respiratory: Negative for cough and shortness of breath  Cardiovascular: Negative for chest pain  Gastrointestinal: Negative for constipation, diarrhea, nausea and vomiting  Neurological: Positive for numbness  Objective:      Ht 5' 9\" (1 753 m)   Wt 95 7 kg (211 lb)   BMI 31 16 kg/m²          Physical Exam  Vitals reviewed  Constitutional:       General: He is not in acute distress  Appearance: He is well-developed  He is not toxic-appearing or diaphoretic  Cardiovascular:      Rate and Rhythm: Normal rate and regular rhythm  Pulses:           Dorsalis pedis pulses are 1+ on the right side and 1+ on the left side  Posterior tibial pulses are 0 on the right side and 0 on the left side  Pulmonary:      Effort: Pulmonary effort is normal  No respiratory distress  Musculoskeletal:         General: Deformity present  No tenderness  Right foot: No foot drop  Left foot: No foot drop  Comments: Hammertoe deformity noted   " 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  Feet:      Right foot:      Skin integrity: Dry skin present  No ulcer, skin breakdown, erythema, warmth or callus  Left foot:      Skin integrity: Dry skin present  No ulcer, skin breakdown, erythema, warmth or callus  Skin:     General: Skin is warm  Capillary Refill: Capillary refill takes less than 2 seconds  Coloration: Skin is not cyanotic or mottled  Findings: No ecchymosis  Nails: There is no clubbing  Comments: Maceration / pitting of skin in left heel resolved  Fissure is worse in left heel, developed into partial thickness ulcer  It is 1 5 X 0 2 cm  No cellulitis  Neurological:      General: No focal deficit present  Mental Status: He is alert and oriented to person, place, and time  Cranial Nerves: No cranial nerve deficit  Sensory: Sensory deficit present  Motor: No weakness  Psychiatric:         Mood and Affect: Mood normal          Behavior: Behavior normal          Thought Content:  Thought content normal          Judgment: Judgment normal

## 2023-05-03 ENCOUNTER — OFFICE VISIT (OUTPATIENT)
Dept: CARDIOLOGY CLINIC | Facility: CLINIC | Age: 63
End: 2023-05-03

## 2023-05-03 VITALS
SYSTOLIC BLOOD PRESSURE: 100 MMHG | HEIGHT: 69 IN | BODY MASS INDEX: 31.4 KG/M2 | HEART RATE: 64 BPM | DIASTOLIC BLOOD PRESSURE: 62 MMHG | WEIGHT: 212 LBS

## 2023-05-03 DIAGNOSIS — E11.22 TYPE 2 DIABETES MELLITUS WITH CHRONIC KIDNEY DISEASE ON CHRONIC DIALYSIS, WITH LONG-TERM CURRENT USE OF INSULIN (HCC): ICD-10-CM

## 2023-05-03 DIAGNOSIS — I10 PRIMARY HYPERTENSION: ICD-10-CM

## 2023-05-03 DIAGNOSIS — Z95.810 PRESENCE OF EXTERNAL CARDIAC DEFIBRILLATOR: ICD-10-CM

## 2023-05-03 DIAGNOSIS — N18.6 TYPE 2 DIABETES MELLITUS WITH CHRONIC KIDNEY DISEASE ON CHRONIC DIALYSIS, WITH LONG-TERM CURRENT USE OF INSULIN (HCC): ICD-10-CM

## 2023-05-03 DIAGNOSIS — I35.0 NONRHEUMATIC AORTIC VALVE STENOSIS: ICD-10-CM

## 2023-05-03 DIAGNOSIS — Z99.2 TYPE 2 DIABETES MELLITUS WITH CHRONIC KIDNEY DISEASE ON CHRONIC DIALYSIS, WITH LONG-TERM CURRENT USE OF INSULIN (HCC): ICD-10-CM

## 2023-05-03 DIAGNOSIS — Z79.4 TYPE 2 DIABETES MELLITUS WITH CHRONIC KIDNEY DISEASE ON CHRONIC DIALYSIS, WITH LONG-TERM CURRENT USE OF INSULIN (HCC): ICD-10-CM

## 2023-05-03 DIAGNOSIS — I25.5 ISCHEMIC CARDIOMYOPATHY: Primary | ICD-10-CM

## 2023-05-03 DIAGNOSIS — E78.5 DYSLIPIDEMIA: ICD-10-CM

## 2023-05-03 DIAGNOSIS — I25.10 CAD, MULTIPLE VESSEL: ICD-10-CM

## 2023-05-03 RX ORDER — SEVELAMER CARBONATE FOR ORAL SUSPENSION 2400 MG/1
POWDER, FOR SUSPENSION ORAL
COMMUNITY
Start: 2023-04-21

## 2023-05-03 NOTE — PATIENT INSTRUCTIONS
You were seen today in the Cardiology office for follow up evaluation  Please continue your current cardiac medications as prescribed  You will receive a call from the electrophysiology office to discuss timing of your ICD  Thank you for choosing [address]  Please call our office or use [email or web address] with any questions

## 2023-05-03 NOTE — PROGRESS NOTES
[facility]    Name:Asad Christensen   DOS: 5/3/2023     Chief Complaint:   Chief Complaint   Patient presents with    Follow-up     6 month ov, after echo  No cardiac complaints   HISTORY OF PRESENT ILLNESS:    HPI:  Guille Connolly is a 61 y o  male  He  has a past medical history of Cerebrovascular accident (CVA) due to thrombosis of left middle cerebral artery ([address] ) (07/29/2018), Chronic kidney disease, Diabetes mellitus ([facility] [address] ), Dialysis patient ([address] ), GERD (gastroesophageal reflux disease), Hypercholesteremia, Hyperlipidemia, Hypertension, Infectious viral hepatitis, Neuropathy, Obesity, Osteomyelitis ([facility] [address] ), PVC's (premature ventricular contractions), Stroke ([facility] [address] ), and TIA (transient ischemic attack) (10/28/2018)  He presents for follow up evaluation  Last saw me in the office on 3/17/23  Per my  Last OV note: This is a 61 y  o  male with a past medical history significant for CAD status post multiple stents and repeat intervention, ESRD on hemodialysis, type 2 diabetes, diabetic polyneuropathy, hypertension, hyperlipidemia, CVA, obesity  He presents for follow up evaluation after recent hospitalization  Please see my inpatient assessment for a complete detail of his cardiac history and my thoughts regarding his medical management of CAD and ischemic cardiomyopathy  Per my consultation note in the hospital:  He was admitted to the hospital after complaining of chest discomfort during his hemodialysis session yesterday   The patient reports left-sided chest discomfort that lasted approximately 1 hour until dialysis was completed  At that last OV, we discussed repeat ECHO imaging at 90 days post intervention to re-evaluate LV systolic function and determine the need for a conversion from LifeVest to ICD  His repeat TTE was successfully completed on 4/23/23, and showed persistently reduced LV systolic function with an EF of 35%   The study also demonstrates mild to moderate aortic "stenosis  Today, he reports no active cardiopulmonary complaints  He has remained compliant with all medications  He denies chest pain, shortness of breath, diaphoresis, dizziness, palpitations, orthopnea, PND, edema, syncope  Has been tolerating dialysis sessions well with no chest pain  ROS    ROS: Pertinent positives and negatives as described in History of Present Illness  Remainder of a 14 point review of systems was negative  No Known Allergies     Current Outpatient Medications on File Prior to Visit   Medication Sig Dispense Refill    aspirin (ECOTRIN LOW STRENGTH) 81 mg EC tablet Take 81 mg by mouth daily Resume on 8/14        atorvastatin (LIPITOR) 40 mg tablet TAKE 1 TABLET BY MOUTH EVERY DAY WITH DINNER 90 tablet 1    Blood Glucose Monitoring Suppl (True Metrix Meter) w/Device KIT Use to test blood sugars 3 times daily 1 kit 0    Blood Pressure Monitoring (BLOOD PRESSURE CUFF) MISC Use to check blood pressure before taking blood pressure medication and 1 hour after and follow instructions provided in discharge instructions based on the readings   1 each 0    D3-50 1 25 MG (52836 UT) capsule TAKE 1 CAPSULE BY MOUTH ONE TIME PER WEEK 12 capsule 2    divalproex sodium (DEPAKOTE SPRINKLE) 125 MG capsule TAKE 2 CAPSULES (250 MG TOTAL) BY MOUTH EVERY 12 (TWELVE) HOURS 360 capsule 1    glucose blood (True Metrix Blood Glucose Test) test strip Use 1 each 3 (three) times a day Use as instructed 300 strip 1    Insulin Pen Needle (BD Pen Needle Adina U/F) 32G X 4 MM MISC Use 4 times daily 400 each 1    Insulin Syringe-Needle U-100 (B-D INS SYRINGE 0 5CC/30GX1/2\") 30G X 1/2\" 0 5 ML MISC Inject once daily 100 each 1    metoprolol succinate (TOPROL-XL) 50 mg 24 hr tablet Take 1 tablet (50 mg total) by mouth 2 (two) times a day 180 tablet 3    ONETOUCH DELICA LANCETS 25S MISC by Does not apply route 3 (three) times a day 270 each 1    prasugrel (EFFIENT) tablet Take 1 tablet (5 mg total) by " mouth daily 90 tablet 3    Sevelamer Carbonate 2 4 g PACK VIGOROUSLY MIX CONTENTS OF 1 PACKET IN WATER (IT WILL NOT DISSOLVE) AND DRINK 3 TIMES DAILY WITH MEALS      calcium acetate (CALPHRON) 667 mg 3 tablets 3x per day      mupirocin (BACTROBAN) 2 % ointment Apply topically 3 (three) times a day 30 g 5     No current facility-administered medications on file prior to visit  Past Medical History:   Diagnosis Date    Cerebrovascular accident (CVA) due to thrombosis of left middle cerebral artery ([place] [id number] ) 07/29/2018    Chronic kidney disease     Diabetes mellitus ([place] [address] )     Dialysis patient ([place] [address] )     M-W-F    GERD (gastroesophageal reflux disease)     Hypercholesteremia     Hyperlipidemia     Hypertension     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    Neuropathy     Obesity     Osteomyelitis ([place] [address] )     last assessed 11/4/16    PVC's (premature ventricular contractions)     sees cardiology Dr Uriel camargo    Stroke [facility])     last weeof July 2018 [address] [place] [address]    TIA (transient ischemic attack) 10/28/2018       Past Surgical History:   Procedure Laterality Date    ABDOMINAL SURGERY      CARDIAC CATHETERIZATION N/A 5/2/2022    Procedure: Cardiac Coronary Angiogram;  Surgeon: Susan Portillo MD;  [facility]; Service: Cardiology    CARDIAC CATHETERIZATION N/A 5/2/2022    Procedure: Cardiac pci;  Surgeon: Susan Portillo MD;  [facility]; Service: Cardiology    CARDIAC CATHETERIZATION  2/1/2023    Procedure: Cardiac catheterization;  Surgeon: Susan Portillo MD;  [facility] CARD[facility]; Service: Cardiology    CARDIAC CATHETERIZATION N/A 2/1/2023    Procedure: Cardiac pci;  Surgeon: Susan Portillo MD;  [facility]ARD[facility]; Service: Cardiology    CARDIAC CATHETERIZATION N/A 2/1/2023    Procedure: Cardiac Coronary Angiogram;  Surgeon: Susan Portillo MD;  [facility]: [facility]ARD[facility];   Service: Cardiology    CARDIAC CATHETERIZATION N/A "2/1/2023    Procedure: Cardiac other-IVUS;  Surgeon: Paula Calvillo MD;  [facility]: [facility] CARD[facility]; Service: Cardiology    CHOLECYSTECTOMY      Percutaneous    COLONOSCOPY      CYSTOSCOPY      OTHER SURGICAL HISTORY      \"stimulator to control bowel movements\"    KS ESOPHAGOGASTRODUODENOSCOPY TRANSORAL DIAGNOSTIC N/A 9/27/2016    Procedure: ESOPHAGOGASTRODUODENOSCOPY (EGD); Surgeon: James Chaudhary MD;  Location: [facility] GI LAB;   Service: Gastroenterology    KS LAPAROSCOPY SURG CHOLECYSTECTOMY N/A 2/29/2016    Procedure: LAPAROSCOPIC CHOLECYSTECTOMY ;  Surgeon: Gadiel Bolanos DO;  Location: [facility] Main OR;  Service: General    ROTATOR CUFF REPAIR Right     TOE AMPUTATION Right 10/28/2016    Procedure: 3RD TOE AMPUTATION ;  Surgeon: Jesusita Hernandez DPM;  Location: [facility] Main [facility];  Service:        Family History   Problem Relation Age of Onset    Leukemia Mother     Liver disease Mother     Lung cancer Mother         heavy smoker - 3 ppd    Heart disease Father     Liver disease Father     Multiple myeloma Sister     Breast cancer Sister     Urolithiasis Family     Alcohol abuse Neg Hx     Depression Neg Hx     Drug abuse Neg Hx     Substance Abuse Neg Hx     Mental illness Neg Hx        Social History     Socioeconomic History    Marital status: /Civil Union     Spouse name: Not on file    Number of children: Not on file    Years of education: Not on file    Highest education level: Not asked   Occupational History    Occupation: disabled   Tobacco Use    Smoking status: Never    Smokeless tobacco: Never   Vaping Use    Vaping Use: Never used   Substance and Sexual Activity    Alcohol use: Not Currently    Drug use: No    Sexual activity: Not Currently   Other Topics Concern    Not on file   Social History Narrative    Daily caffeine consumption 2-3 servings a day     Social Determinants of Health     Financial Resource Strain: Not on file   Food Insecurity: No Food Insecurity    " Worried About [facility] in the Last Year: Never true    Lico of Food in the Last Year: Never true   Transportation Needs: No Transportation Needs    Lack of Transportation (Medical): No    Lack of Transportation (Non-Medical): No   Physical Activity: Not on file   Stress: Not on file   Social Connections: Not on file   Intimate Partner Violence: Not on file   Housing Stability: Low Risk     Unable to Pay for Housing in the Last Year: No    Number of Places Lived in the Last Year: 1    Unstable Housing in the Last Year: No       OBJECTIVE:    There were no vitals taken for this visit  BP Readings from Last 3 Encounters:   04/24/23 110/70   04/18/23 110/70   04/12/23 117/68       Wt Readings from Last 3 Encounters:   04/25/23 95 7 kg (211 lb)   04/24/23 95 7 kg (211 lb)   04/18/23 95 7 kg (211 lb)         Physical Exam  Vitals reviewed  Constitutional:       General: He is not in acute distress  Appearance: Normal appearance  He is ill-appearing  He is not diaphoretic  Comments: LifeVest external cardiac defibrillator   HENT:      Head: Normocephalic and atraumatic  Eyes:      Conjunctiva/sclera: Conjunctivae normal    Neck:      Vascular: No carotid bruit or JVD  Cardiovascular:      Rate and Rhythm: Normal rate and regular rhythm  Pulses: Normal pulses  Heart sounds: Normal heart sounds  No murmur heard  No friction rub  No gallop  Pulmonary:      Effort: Pulmonary effort is normal       Breath sounds: Normal breath sounds  No wheezing, rhonchi or rales  Abdominal:      General: Abdomen is flat  Bowel sounds are normal  There is no distension  Palpations: Abdomen is soft  Musculoskeletal:      Right lower leg: No edema  Left lower leg: No edema  Skin:     General: Skin is warm and dry  Comments: LUE AV fistula   Neurological:      General: No focal deficit present  Mental Status: He is alert and oriented to person, place, and time  Psychiatric:         Mood and Affect: Mood normal          Behavior: Behavior normal                                                        Cardiac testing:       Results for orders placed during the hospital encounter of 10/05/20    Echo complete with contrast if indicated    Narrative  [facility], [address] [facility]  [phone]    Transthoracic Echocardiogram  2D, M-mode, Doppler, and Color Doppler    Study date:  06-Oct-2020    Patient: Vannessa Dickey  MR number: [id number]  Account number: [de-identified]  : 1960  Age: 61 years  Gender: Male  Status: Inpatient  Location: Bedside  Height: 70 in  Weight: 239 6 lb  BP: 165/ 80 mmHg    Indications: Shortness of breath  Diagnoses: R06 02 - Shortness of breath    Sonographer:  Melanie Williamson RDCS  Primary Physician:  Kaylyn Boudreaux DO  Referring Physician:  Miah Dominguez MD  Group:  [facility]  Interpreting Physician:  Rob Ma MD    SUMMARY    LEFT VENTRICLE:  Systolic function was normal by visual assessment  Ejection fraction was estimated to be 60 %  There were no regional wall motion abnormalities  Wall thickness was moderately increased  Features were consistent with a pseudonormal left ventricular filling pattern, with concomitant abnormal relaxation and increased filling pressure (grade 2 diastolic dysfunction)  LEFT ATRIUM:  The atrium was mildly dilated  RIGHT ATRIUM:  The atrium was mildly dilated  MITRAL VALVE:  There was moderate annular calcification  There was mild regurgitation  AORTIC VALVE:  The valve was trileaflet  Leaflets exhibited normal thickness, moderate calcification, and moderately reduced cuspal separation  Transaortic velocity was increased due to valvular stenosis  There was mild to moderate stenosis  There was mild regurgitation  TRICUSPID VALVE:  There was trace regurgitation  PULMONIC VALVE:  There was mild regurgitation      HISTORY: PRIOR HISTORY: DM2  CKD4  Hypertension  CVA  GERD  Dementia  PROCEDURE: The procedure was performed at the bedside  This was a routine study  The transthoracic approach was used  The study included complete 2D imaging, M-mode, complete spectral Doppler, and color Doppler  The heart rate was 98 bpm,  at the start of the study  Image quality was adequate  LEFT VENTRICLE: Size was normal  Systolic function was normal by visual assessment  Ejection fraction was estimated to be 60 %  There were no regional wall motion abnormalities  Wall thickness was moderately increased  DOPPLER: The ratio  of early ventricular filling to atrial contraction velocities was within the normal range  Features were consistent with a pseudonormal left ventricular filling pattern, with concomitant abnormal relaxation and increased filling pressure  (grade 2 diastolic dysfunction)  RIGHT VENTRICLE: The size was normal  Systolic function was normal  DOPPLER: Systolic pressure was not estimated  LEFT ATRIUM: The atrium was mildly dilated  RIGHT ATRIUM: The atrium was mildly dilated  MITRAL VALVE: There was moderate annular calcification  Valve structure was normal  There was normal leaflet separation  DOPPLER: The transmitral velocity was within the normal range  There was no evidence for stenosis  There was mild  regurgitation  AORTIC VALVE: The valve was trileaflet  Leaflets exhibited normal thickness, moderate calcification, and moderately reduced cuspal separation  DOPPLER: Transaortic velocity was increased due to valvular stenosis  There was mild to moderate  stenosis  There was mild regurgitation  TRICUSPID VALVE: The valve structure was normal  There was normal leaflet separation  DOPPLER: The transtricuspid velocity was within the normal range  There was no evidence for stenosis  There was trace regurgitation  PULMONIC VALVE: Leaflets exhibited normal thickness, no calcification, and normal cuspal separation  DOPPLER: The transpulmonic velocity was within the normal range  There was mild regurgitation  PERICARDIUM: There was no pericardial effusion  AORTA: The root exhibited normal size  SYSTEMIC VEINS: IVC: The inferior vena cava was normal in size and course  Respirophasic changes were normal     SYSTEM MEASUREMENT TABLES    2D  %FS: 36 09 %  Ao Diam: 3 8 cm  EDV(Teich): 182 77 ml  EF(Teich): 64 8 %  ESV(Teich): 64 33 ml  IVSd: 1 57 cm  LA Area: 24 31 cm2  LA Diam: 4 63 cm  LVEDV MOD A4C: 192 34 ml  LVEF MOD A4C: 65 32 %  LVESV MOD A4C: 66 7 ml  LVIDd: 6 04 cm  LVIDs: 3 86 cm  LVLd A4C: 9 02 cm  LVLs A4C: 7 15 cm  LVOT Diam: 2 21 cm  LVPWd: 1 61 cm  RA Area: 17 56 cm2  RVIDd: 4 cm  SV MOD A4C: 125 64 ml  SV(Teich): 118 44 ml    CW  AV Env  Ti: 330 16 ms  AV MaxP 64 mmHg  AV VTI: 57 9 cm  AV Vmax: 2 58 m/s  AV Vmean: 1 76 m/s  AV meanP 38 mmHg  TR MaxP 37 mmHg  TR Vmax: 2 71 m/s    MM  TAPSE: 1 84 cm    PW  FATUMA (VTI): 1 27 cm2  FATUMA Vmax: 1 42 cm2  AVAI (VTI): 0 cm2/m2  AVAI Vmax: 0 cm2/m2  E' Sept: 0 07 m/s  E/E' Sept: 12 69  LVOT Env  Ti: 335 87 ms  LVOT VTI: 19 11 cm  LVOT Vmax: 0 95 m/s  LVOT Vmean: 0 57 m/s  LVOT maxPG: 3 63 mmHg  LVOT meanP 61 mmHg  LVSI Dopp: 32 42 ml/m2  LVSV Dopp: 73 27 ml  MV A [place]: 0 64 m/s  MV Dec [place]: 4 7 m/s2  MV DecT: 189 67 ms  MV E [place]: 0 89 m/s  MV E/A Ratio: 1 38  MV PHT: 55 01 ms  MVA By PHT: 4 cm2    [address] Accredited Echocardiography [facility]    Prepared and electronically signed by    Isac Calderon MD  Signed 06-Oct-2020 16:31:49          LABS:  Lab Results   Component Value Date    GLUCOSE 142 (H) 2023    BUN 14 2023    CREATININE 3 94 (H) 2023    CALCIUM 9 2 2023     08/10/2016    K 3 9 2023    CO2 33 (H) 2023    CL 95 (L) 2023    ALKPHOS 62 2023    BILITOT 0 6 08/10/2016    PROT 6 7 08/10/2016    AST 10 2023    ALT 12 2023    ANIONGAP 9 2016        Lab Results Component Value Date    WBC 6 14 03/27/2023    HGB 9 7 (L) 03/27/2023    HCT 31 2 (L) 03/27/2023    MCV 98 03/27/2023     03/27/2023       Lab Results   Component Value Date    CHOL 203 (H) 08/10/2016    HDL 30 (L) 01/30/2023    LDLCALC 21 01/30/2023    TRIG 123 01/30/2023       Lab Results   Component Value Date    HGBA1C 4 9 04/18/2023       No results found for: TSH    ASSESSMENT/PLAN:  Diagnoses and all orders for this visit:    Ischemic cardiomyopathy  Remains on a suboptimal GDMT regimen consisting only of Toprol-XL due to baseline renal dysfunction and concerns that he would not be a transplant candidate if he were on full quad therapy  On maximally tolerated beta blocker at this point  LVEF persistently reduced despite maximally tolerated therapy  Referral to EP for primary prevention ICD  Patient to continue LifeVest until that time  -     Ambulatory referral to Cardiac Electrophysiology; Future    CAD, multiple vessel  - Remains chest pain free  No recurrent chest discomfort with dialysis  - He recently underwent coronary angiography with ADE placement to in stent re-stenosis of a prior LAD lesion that was treated  - Previously deemed a Plavix nonresponder due to suspected in-stent restenosis   Maintained on Effient 5 mg once daily since approximately August 2022, although its unclear to me why he is on this medication with a known history of CVA in the past and a clear expert consensus warning on the use of this medication in patients with a history of stroke due to a signal towards increased intracerebral hemorrhage in this patient population   Patient's son states that he was tried on Brilinta in the past after being deemed a Plavix nonresponder, but also found to apparently have had stent complications on Brilinta   I am unable to find documentation of this in the chart available to me     - Given his stability on Effient thus far and complex coronary history, through a shared decision making approach we decided to continue Effient for the time being  Nonrheumatic aortic valve stenosis  Closer to mild in severity based upon recent ECHO FATUMA >1 5 cm2  Increased velocities and DVI are likely representative of high flow state with an AV fistula  Either way, no current indication for valve replacement and I recommend continuing to follow with surveillance imaging  Primary hypertension  Stable  Not on optimal GDMT as noted above due to concerns with not receiving his kidney transplant  I advised careful home blood pressure monitoring  Type 2 diabetes mellitus with chronic kidney disease on chronic dialysis, with long-term current use of insulin (HCC)  Well controlled    Presence of external cardiac defibrillator  Continue until he is scheduled for ICD implantation with EP  Dyslipidemia  Continue high intensity statin therapy      Other orders  -     Sevelamer Carbonate 2 4 g PACK; VIGOROUSLY MIX CONTENTS OF 1 PACKET IN WATER (IT WILL NOT DISSOLVE) AND DRINK 3 TIMES DAILY WITH MEALS                Craig Severance, MD

## 2023-05-04 ENCOUNTER — OFFICE VISIT (OUTPATIENT)
Dept: ENDOCRINOLOGY | Facility: CLINIC | Age: 63
End: 2023-05-04

## 2023-05-04 VITALS
DIASTOLIC BLOOD PRESSURE: 62 MMHG | BODY MASS INDEX: 31.4 KG/M2 | HEART RATE: 66 BPM | WEIGHT: 212 LBS | HEIGHT: 69 IN | SYSTOLIC BLOOD PRESSURE: 112 MMHG

## 2023-05-04 DIAGNOSIS — Z99.2 TYPE 2 DIABETES MELLITUS WITH CHRONIC KIDNEY DISEASE ON CHRONIC DIALYSIS, WITHOUT LONG-TERM CURRENT USE OF INSULIN (HCC): Primary | ICD-10-CM

## 2023-05-04 DIAGNOSIS — E11.22 TYPE 2 DIABETES MELLITUS WITH CHRONIC KIDNEY DISEASE ON CHRONIC DIALYSIS, WITHOUT LONG-TERM CURRENT USE OF INSULIN (HCC): Primary | ICD-10-CM

## 2023-05-04 DIAGNOSIS — E78.2 MIXED HYPERLIPIDEMIA: ICD-10-CM

## 2023-05-04 DIAGNOSIS — I10 PRIMARY HYPERTENSION: ICD-10-CM

## 2023-05-04 DIAGNOSIS — N18.6 TYPE 2 DIABETES MELLITUS WITH CHRONIC KIDNEY DISEASE ON CHRONIC DIALYSIS, WITHOUT LONG-TERM CURRENT USE OF INSULIN (HCC): Primary | ICD-10-CM

## 2023-05-04 NOTE — PROGRESS NOTES
Patient Progress Note      CC: DM      Referring Provider  No referring provider defined for this encounter  History of Present Illness:   Mónica Carrasco is a 61 y o  male with a history of type 2 diabetes with long term use of insulin  Diabetes course has been stable  Complications of DM: ESRD on HD, TIA, neuropathy  He has had multiple ER visits and hospitalizations this past year for various reasons  Denies recent severe hypoglycemic or severe hyperglycemic episodes  Denies any issues with his current regimen  Home glucose monitoring: are performed regularly, 3 times a day     No log or meter for review today  Readings range in low 100s mg/dl     Current regimen:  no treatment  Hypoglycemic episodes: No, rare  H/o of hypoglycemia causing hospitalization or Intervention such as glucagon injection  or ambulance call :  Yes  Hypoglycemia symptoms: jitteriness and sweating  Treatment of hypoglycemia: soda, juice  Has glucagon pen at home  Medic alert tag: recommended: Yes     Diabetes education: Yes  Diet: 3 meals per day, 0 snack per day  Timing of meals is predictable  Diabetic diet compliance:  compliant most of the time  Activity: Daily activity is predictable: Yes  No routine exercise  Ophthamology:  Nov 2022  Podiatry: March 2023     Has hyperlipidemia: on statin - tolerating well, no myalgias  compliant most of the time, denies any side effects from medications    Thyroid disorders: No  History of pancreatitis: No    Patient Active Problem List   Diagnosis    Type 2 diabetes mellitus with chronic kidney disease on chronic dialysis, without long-term current use of insulin (HCC)    Dizziness    Mixed hyperlipidemia    Nephrotic range proteinuria    Elevated alkaline phosphatase level    Diabetic macular edema (HCC)    GERD (gastroesophageal reflux disease)    Other constipation    Abnormal EEG    History of stroke    Anxiety associated with depression    Urge incontinence    Overactive bladder    S/P arteriovenous (AV) fistula creation    Pre-kidney transplant, listed    Anemia    History of 2019 novel coronavirus disease (COVID-19)    ESRD on dialysis ([place] [address] )    Penetrating foot wound, left, initial encounter    Emotional lability    Primary hypertension    Generalized weakness    Chest pain    Electrolyte abnormality    [facility]D (end stage renal disease) ([facility] )    CAD, multiple vessel    SOB (shortness of breath)    Left arm swelling    Pre-syncope    Type 1 non-ST elevation myocardial infarction (NSTEMI) s/p ADE to in-stent restenosis of mid LAD    Ischemic cardiomyopathy    Moderate AS (aortic stenosis)    Mood disorder (HCC)    Diabetic polyneuropathy associated with type 2 diabetes mellitus (HCC)    Absence of toe of right foot (HCC)    Hammer toes of both feet    Elevated troponin    Presence of external cardiac defibrillator    Ulcer of left heel, limited to breakdown of skin [facility])      Past Medical History:   Diagnosis Date    Cerebrovascular accident (CVA) due to thrombosis of left middle cerebral artery ([place] [address] ) 07/29/2018    Chronic kidney disease     Diabetes mellitus ([place] [address] )     Dialysis patient ([place] [address] )     M-W-F    GERD (gastroesophageal reflux disease)     Hypercholesteremia     Hyperlipidemia     Hypertension     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    Neuropathy     Obesity     Osteomyelitis ([place] [address] )     last assessed 11/4/16    PVC's (premature ventricular contractions)     sees cardiology Dr Silke camargo    Stroke [facility])     last weeof July 2018 [address]    TIA (transient ischemic attack) 10/28/2018      Past Surgical History:   Procedure Laterality Date    ABDOMINAL SURGERY      CARDIAC CATHETERIZATION N/A 5/2/2022    Procedure: Cardiac Coronary Angiogram;  Surgeon: Sofía Lucas MD;  [facility];   Service: Cardiology    CARDIAC CATHETERIZATION N/A 5/2/2022    Procedure: Cardiac pci;  Surgeon: "Kinga Isaacs MD;  [facility]; Service: Cardiology    CARDIAC CATHETERIZATION  2/1/2023    Procedure: Cardiac catheterization;  Surgeon: Kinga Isaacs MD;  [facility]; Service: Cardiology    CARDIAC CATHETERIZATION N/A 2/1/2023    Procedure: Cardiac pci;  Surgeon: Kinga Isaacs MD;  [facility]: [facility] CARD[facility]; Service: Cardiology    CARDIAC CATHETERIZATION N/A 2/1/2023    Procedure: Cardiac Coronary Angiogram;  Surgeon: Kinga Isaacs MD;  [facility]; Service: Cardiology    CARDIAC CATHETERIZATION N/A 2/1/2023    Procedure: Cardiac other-IVUS;  Surgeon: Kinga Isaacs MD;  [facility]: [facility] CARD[facility]; Service: Cardiology    CHOLECYSTECTOMY      Percutaneous    COLONOSCOPY      CYSTOSCOPY      OTHER SURGICAL HISTORY      \"stimulator to control bowel movements\"    [place] ESOPHAGOGASTRODUODENOSCOPY TRANSORAL DIAGNOSTIC N/A 9/27/2016    Procedure: ESOPHAGOGASTRODUODENOSCOPY (EGD); Surgeon: Jennifer Ramirez MD;  Location: [facility] GI LAB;   Service: Gastroenterology    RI LAPAROSCOPY SURG CHOLECYSTECTOMY N/A 2/29/2016    Procedure: LAPAROSCOPIC CHOLECYSTECTOMY ;  Surgeon: Leisa Da Silva DO;  Location: [facility] Main OR;  Service: General    ROTATOR CUFF REPAIR Right     TOE AMPUTATION Right 10/28/2016    Procedure: 3RD TOE AMPUTATION ;  Surgeon: Juan Carlos Anguiano DPM;  Location: [facility] Main OR;  Service:       Family History   Problem Relation Age of Onset    Leukemia Mother     Liver disease Mother     Lung cancer Mother         heavy smoker - 3 ppd    Heart disease Father     Liver disease Father     Diabetes type I Father     Multiple myeloma Sister     Breast cancer Sister     Urolithiasis Family     Alcohol abuse Neg Hx     Depression Neg Hx     Drug abuse Neg Hx     Substance Abuse Neg Hx     Mental illness Neg Hx      Social History     Tobacco Use    Smoking status: Never    Smokeless tobacco: Never   Substance Use Topics    Alcohol use: Not " "Currently     No Known Allergies      Current Outpatient Medications:     aspirin (ECOTRIN LOW STRENGTH) 81 mg EC tablet, Take 81 mg by mouth daily Resume on 8/14  , Disp: , Rfl:     atorvastatin (LIPITOR) 40 mg tablet, TAKE 1 TABLET BY MOUTH EVERY DAY WITH DINNER, Disp: 90 tablet, Rfl: 1    Blood Glucose Monitoring Suppl (True Metrix Meter) w/Device KIT, Use to test blood sugars 3 times daily, Disp: 1 kit, Rfl: 0    Blood Pressure Monitoring (BLOOD PRESSURE CUFF) MISC, Use to check blood pressure before taking blood pressure medication and 1 hour after and follow instructions provided in discharge instructions based on the readings  , Disp: 1 each, Rfl: 0    D3-50 1 25 MG (47254 UT) capsule, TAKE 1 CAPSULE BY MOUTH ONE TIME PER WEEK, Disp: 12 capsule, Rfl: 2    divalproex sodium (DEPAKOTE SPRINKLE) 125 MG capsule, TAKE 2 CAPSULES (250 MG TOTAL) BY MOUTH EVERY 12 (TWELVE) HOURS, Disp: 360 capsule, Rfl: 1    glucose blood (True Metrix Blood Glucose Test) test strip, Use 1 each 3 (three) times a day Use as instructed, Disp: 300 strip, Rfl: 1    Insulin Pen Needle (BD Pen Needle Adina U/F) 32G X 4 MM MISC, Use 4 times daily, Disp: 400 each, Rfl: 1    Insulin Syringe-Needle U-100 (B-D INS SYRINGE 0 5CC/30GX1/2\") 30G X 1/2\" 0 5 ML MISC, Inject once daily, Disp: 100 each, Rfl: 1    metoprolol succinate (TOPROL-XL) 50 mg 24 hr tablet, Take 1 tablet (50 mg total) by mouth 2 (two) times a day, Disp: 180 tablet, Rfl: 3    ONETOUCH DELICA LANCETS 88Y MISC, by Does not apply route 3 (three) times a day, Disp: 270 each, Rfl: 1    prasugrel (EFFIENT) tablet, Take 1 tablet (5 mg total) by mouth daily, Disp: 90 tablet, Rfl: 3    Sevelamer Carbonate 2 4 g PACK, VIGOROUSLY MIX CONTENTS OF 1 PACKET IN WATER (IT WILL NOT DISSOLVE) AND DRINK 3 TIMES DAILY WITH MEALS, Disp: , Rfl:   Review of Systems   Constitutional: Negative for activity change, appetite change, fatigue and unexpected weight change     HENT: Negative for " "trouble swallowing  Eyes: Negative for visual disturbance  Respiratory: Negative for shortness of breath  Cardiovascular: Negative for chest pain and palpitations  Gastrointestinal: Negative for constipation and diarrhea  Endocrine: Negative for polydipsia and polyuria  Musculoskeletal: Negative  Skin: Positive for wound (heel peeling, seeing podiatry)  Neurological: Negative for numbness  Psychiatric/Behavioral: Negative  Physical Exam:  Body mass index is 31 31 kg/m²  /62   Pulse 66   Ht 5' 9\" (1 753 m)   Wt 96 2 kg (212 lb)   BMI 31 31 kg/m²    Wt Readings from Last 3 Encounters:   05/04/23 96 2 kg (212 lb)   05/03/23 96 2 kg (212 lb)   04/25/23 95 7 kg (211 lb)       Physical Exam  Vitals and nursing note reviewed  Constitutional:       Appearance: He is well-developed  HENT:      Head: Normocephalic  Eyes:      General: No scleral icterus  Pupils: Pupils are equal, round, and reactive to light  Neck:      Thyroid: No thyromegaly  Cardiovascular:      Rate and Rhythm: Normal rate and regular rhythm  Heart sounds: No murmur heard  Comments: Wearing lifevest    Pulmonary:      Effort: Pulmonary effort is normal  No respiratory distress  Breath sounds: Normal breath sounds  No wheezing  Musculoskeletal:      Cervical back: Neck supple  Skin:     General: Skin is warm and dry  Neurological:      Mental Status: He is alert  Patient's shoes and socks were not removed            Labs:   Component      Latest Ref Rng & Units 2/1/2023 3/27/2023 4/18/2023   Sodium      135 - 147 mmol/L  136    Potassium      3 5 - 5 3 mmol/L  3 9    Chloride      96 - 108 mmol/L  95 (L)    CO2      21 - 32 mmol/L  33 (H)    Anion Gap      4 - 13 mmol/L  8    BUN      5 - 25 mg/dL  14    Creatinine      0 60 - 1 30 mg/dL  3 94 (H)    Glucose, Random      65 - 140 mg/dL  84    Calcium      8 4 - 10 2 mg/dL  9 2    eGFR      ml/min/1 73sq m  15    LDL CHOLESTEROL " DIRECT      0 - 100 mg/dl 44     Hemoglobin A1C      6 5   4 9       Plan:    Diagnoses and all orders for this visit:    Type 2 diabetes mellitus with chronic kidney disease on chronic dialysis, without long-term current use of insulin (HCC)  HGA1C 4 9%  Improved  Treatment regimen: A1c may not be reliable as patient has end-stage renal disease  We will check fructosamine level  Per wife patient has been off all diabetes treatments for the past 1 to 2 months and blood sugars have been well controlled  Advised patient to send blood sugar log  Continue with lifestyle modifications  Episodes of hypoglycemia can lead to permanent disability and death  Discussed risks/complications associated with uncontrolled diabetes  Advised to adhere to diabetic diet, and recommended staying active/exercising routinely as tolerated  Keep carbohydrates consistent to limit blood glucose fluctuations  Advised to call if blood sugars less than 70 mg/dl or over 250 mg/dl  Check blood glucose 2+ times a day  Discussed symptoms and treatment of hypoglycemia  Recommended routine follow-up with podiatry and ophthalmology  Ordered blood work to complete prior to next visit  -     Fructosamine; Future  -     Hemoglobin A1C; Future  -     Basic metabolic panel; Future  -     Fructosamine; Future    Primary hypertension  Blood pressure adequately controlled, continue current treatment  Managed by nephrology  -     Basic metabolic panel; Future    Mixed hyperlipidemia  LDL previously 44  Continue statin therapy  Managed by cardiology           Discussed with the patient diagnosis and treatment and all questions fully answered  He will call me if any problems arise  Counseled patient on diagnostic results, prognosis, risk and benefit of treatment options, instruction for management, importance of treatment compliance, risk factor reduction and impressions        Lissette Brennan PA-C

## 2023-05-04 NOTE — PATIENT INSTRUCTIONS
Hypoglycemia instructions   Vicky Cruz  5/4/2023  [id number]    Low Blood Sugar    Steps to treat low blood sugar  1  Test blood sugar if you have symptoms of low blood sugar:   Low Blood Sugar Symptoms:  o Sweaty  o Dizzy  o Rapid heartbeat  o Shaky  o Bad mood  o Hungry      2  Treat blood sugar less than 70 with 15 grams of fast-acting carbohydrate:   Examples of 15 grams Fast-Acting Carbohydrate:  o 4 oz juice  o 4 oz regular soda  o 3-4 glucose tablets (chew)  o 3-4 hard candies (chew)          3  Wait 15 minutes and test your blood sugar again     4   If blood sugar is less than 100, repeat steps 2-3     5  When your blood sugar is 100 or more, eat a snack if it will be longer than one hour until your next meal  The snack should be 15 grams of carbohydrate and a protein:   Examples of snacks:  o ½ sandwich  o 6 crackers with cheese  o Piece of fruit with cheese or peanut butter  o 6 crackers with peanut butter

## 2023-05-09 ENCOUNTER — OFFICE VISIT (OUTPATIENT)
Dept: PODIATRY | Facility: CLINIC | Age: 63
End: 2023-05-09

## 2023-05-09 VITALS
SYSTOLIC BLOOD PRESSURE: 115 MMHG | WEIGHT: 212 LBS | DIASTOLIC BLOOD PRESSURE: 75 MMHG | BODY MASS INDEX: 31.4 KG/M2 | HEIGHT: 69 IN

## 2023-05-09 DIAGNOSIS — L97.421 ULCER OF LEFT HEEL, LIMITED TO BREAKDOWN OF SKIN (HCC): Primary | ICD-10-CM

## 2023-05-09 DIAGNOSIS — E11.42 DIABETIC POLYNEUROPATHY ASSOCIATED WITH TYPE 2 DIABETES MELLITUS (HCC): ICD-10-CM

## 2023-05-09 NOTE — PROGRESS NOTES
PATIENT:  Sarah Cartagena    1960      ASSESSMENT:     1  Ulcer of left heel, limited to breakdown of skin ([facility] )        2  Diabetic polyneuropathy associated with type 2 diabetes mellitus ([place] [address] )            PLAN:  1  Reviewed medical records  Patient was counseled on the condition and diagnosis  Educated disease prevention and risks related to diabetes  2  Stressed on patient compliance about proper dressing care and off-loading  He is not allowed to remove the dressing  Dermagren gauze and DSD daily  Sent him again for Globoped shoe for off-loading left heel  Information was given for purchase  3  RA in 2 weeks  I have spent a total time of 20 minutes on 05/09/23 in caring for this patient including Instructions for management, Patient and family education, Risk factor reductions, Counseling / Coordination of care and Documenting in the medical record  Subjective:      HPI:   The patients presents for left heel wound  He presents with no dressing to left heel  He tends to remove the dressing  He did not obtain off-loading shoe  No significant pain  BS under control  The following portions of the patient's history were reviewed and updated as appropriate: allergies, current medications, past family history, past medical history, past social history, past surgical history and problem list   All pertinent labs and images were reviewed      Past Medical History  Past Medical History:   Diagnosis Date   • Cerebrovascular accident (CVA) due to thrombosis of left middle cerebral artery ([place] [address] ) 07/29/2018   • Chronic kidney disease    • Diabetes mellitus ([place] [address] )    • Dialysis patient [facility])     M-W-F   • GERD (gastroesophageal reflux disease)    • Hypercholesteremia    • Hyperlipidemia    • Hypertension    • 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   • Neuropathy    • Obesity    • Osteomyelitis ([place] [address] )     last assessed 11/4/16   • PVC's (premature ventricular contractions)     sees "cardiology Dr Jeanne camargo   • Stroke [facility])     last weeof July 2018 [address] [facility]   • TIA (transient ischemic attack) 10/28/2018       Past Surgical History  Past Surgical History:   Procedure Laterality Date   • ABDOMINAL SURGERY     • CARDIAC CATHETERIZATION N/A 5/2/2022    Procedure: Cardiac Coronary Angiogram;  Surgeon: Cris Herrera MD;  [facility]; Service: Cardiology   • CARDIAC CATHETERIZATION N/A 5/2/2022    Procedure: Cardiac pci;  Surgeon: Cris Herrera MD;  [facility]; Service: Cardiology   • CARDIAC CATHETERIZATION  2/1/2023    Procedure: Cardiac catheterization;  Surgeon: Cris Herrera MD;  [facility]; Service: Cardiology   • CARDIAC CATHETERIZATION N/A 2/1/2023    Procedure: Cardiac pci;  Surgeon: Cris Herrera MD;  [facility]: [facility]; Service: Cardiology   • CARDIAC CATHETERIZATION N/A 2/1/2023    Procedure: Cardiac Coronary Angiogram;  Surgeon: Cris Herrera MD;  [facility]; Service: Cardiology   • CARDIAC CATHETERIZATION N/A 2/1/2023    Procedure: Cardiac other-IVUS;  Surgeon: Cris Herrera MD;  [facility]: [facility]; Service: Cardiology   • CHOLECYSTECTOMY      Percutaneous   • COLONOSCOPY     • CYSTOSCOPY     • OTHER SURGICAL HISTORY      \"stimulator to control bowel movements\"   • [place] ESOPHAGOGASTRODUODENOSCOPY TRANSORAL DIAGNOSTIC N/A 9/27/2016    Procedure: ESOPHAGOGASTRODUODENOSCOPY (EGD); Surgeon: Jai Garcia MD;  Location: [facility] GI LAB; Service: Gastroenterology   • [place] LAPAROSCOPY SURG CHOLECYSTECTOMY N/A 2/29/2016    Procedure: LAPAROSCOPIC CHOLECYSTECTOMY ;  Surgeon: Etienne Wilder DO;  Location: [facility];  Service: General   • ROTATOR CUFF REPAIR Right    • TOE AMPUTATION Right 10/28/2016    Procedure: 3RD TOE AMPUTATION ;  Surgeon: James Knox DPM;  Location: [facility];  Service:         Allergies:  Patient has no known allergies      Medications:  Current " "Outpatient Medications   Medication Sig Dispense Refill   • aspirin (ECOTRIN LOW STRENGTH) 81 mg EC tablet Take 81 mg by mouth daily Resume on 8/14       • atorvastatin (LIPITOR) 40 mg tablet TAKE 1 TABLET BY MOUTH EVERY DAY WITH DINNER 90 tablet 1   • Blood Glucose Monitoring Suppl (True Metrix Meter) w/Device KIT Use to test blood sugars 3 times daily 1 kit 0   • Blood Pressure Monitoring (BLOOD PRESSURE CUFF) MISC Use to check blood pressure before taking blood pressure medication and 1 hour after and follow instructions provided in discharge instructions based on the readings  1 each 0   • D3-50 1 25 MG (69809 UT) capsule TAKE 1 CAPSULE BY MOUTH ONE TIME PER WEEK 12 capsule 2   • divalproex sodium (DEPAKOTE SPRINKLE) 125 MG capsule TAKE 2 CAPSULES (250 MG TOTAL) BY MOUTH EVERY 12 (TWELVE) HOURS 360 capsule 1   • glucose blood (True Metrix Blood Glucose Test) test strip Use 1 each 3 (three) times a day Use as instructed 300 strip 1   • Insulin Pen Needle (BD Pen Needle Adina U/F) 32G X 4 MM MISC Use 4 times daily 400 each 1   • Insulin Syringe-Needle U-100 (B-D INS SYRINGE 0 5CC/30GX1/2\") 30G X 1/2\" 0 5 ML MISC Inject once daily 100 each 1   • metoprolol succinate (TOPROL-XL) 50 mg 24 hr tablet Take 1 tablet (50 mg total) by mouth 2 (two) times a day 180 tablet 3   • ONETOUCH DELICA LANCETS 13X MISC by Does not apply route 3 (three) times a day 270 each 1   • prasugrel (EFFIENT) tablet Take 1 tablet (5 mg total) by mouth daily 90 tablet 3   • Sevelamer Carbonate 2 4 g PACK VIGOROUSLY MIX CONTENTS OF 1 PACKET IN WATER (IT WILL NOT DISSOLVE) AND DRINK 3 TIMES DAILY WITH MEALS       No current facility-administered medications for this visit         Social History:  Social History     Socioeconomic History   • Marital status: /Civil Union     Spouse name: None   • Number of children: None   • Years of education: None   • Highest education level: Not asked   Occupational History   • Occupation: disabled " "  Tobacco Use   • Smoking status: Never   • Smokeless tobacco: Never   Vaping Use   • Vaping Use: Never used   Substance and Sexual Activity   • Alcohol use: Not Currently   • Drug use: No   • Sexual activity: Not Currently   Other Topics Concern   • None   Social History Narrative    Daily caffeine consumption 2-3 servings a day     Social Determinants of Health     Financial Resource Strain: Not on file   Food Insecurity: No Food Insecurity   • Worried About Running Out of Food in the Last Year: Never true   • Ran Out of Food in the Last Year: Never true   Transportation Needs: No Transportation Needs   • Lack of Transportation (Medical): No   • Lack of Transportation (Non-Medical): No   Physical Activity: Not on file   Stress: Not on file   Social Connections: Not on file   Intimate Partner Violence: Not on file   Housing Stability: Low Risk    • Unable to Pay for Housing in the Last Year: No   • Number of Places Lived in the Last Year: 1   • Unstable Housing in the Last Year: No        Review of Systems   Constitutional: Negative for chills and fever  Respiratory: Negative for cough and shortness of breath  Cardiovascular: Negative for chest pain  Gastrointestinal: Negative for constipation, diarrhea, nausea and vomiting  Neurological: Positive for numbness  Objective:      /75   Ht 5' 9\" (1 753 m)   Wt 96 2 kg (212 lb)   BMI 31 31 kg/m²          Physical Exam  Vitals reviewed  Constitutional:       General: He is not in acute distress  Appearance: He is well-developed  He is not toxic-appearing or diaphoretic  Cardiovascular:      Rate and Rhythm: Normal rate and regular rhythm  Pulses:           Dorsalis pedis pulses are 1+ on the right side and 1+ on the left side  Posterior tibial pulses are 0 on the right side and 0 on the left side  Pulmonary:      Effort: Pulmonary effort is normal  No respiratory distress     Musculoskeletal:         General: Deformity " present  No tenderness  Right foot: No foot drop  Left foot: No foot drop  Comments: Hammertoe deformity noted  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  Feet:      Right foot:      Skin integrity: Dry skin present  No ulcer, skin breakdown, erythema, warmth or callus  Left foot:      Skin integrity: Dry skin present  No ulcer, skin breakdown, erythema, warmth or callus  Skin:     General: Skin is warm  Capillary Refill: Capillary refill takes less than 2 seconds  Coloration: Skin is not cyanotic or mottled  Findings: No ecchymosis  Nails: There is no clubbing  Comments: Partial thickness ulcer in left heel  Decrease in depth  It is 1 5 X 0 2 cm  No cellulitis  Neurological:      General: No focal deficit present  Mental Status: He is alert and oriented to person, place, and time  Cranial Nerves: No cranial nerve deficit  Sensory: Sensory deficit present  Motor: No weakness  Psychiatric:         Mood and Affect: Mood normal          Behavior: Behavior normal          Thought Content:  Thought content normal          Judgment: Judgment normal

## 2023-05-10 ENCOUNTER — TELEPHONE (OUTPATIENT)
Dept: PODIATRY | Facility: CLINIC | Age: 63
End: 2023-05-10

## 2023-05-10 NOTE — TELEPHONE ENCOUNTER
Caller: Jackson Veras    Doctor/Office: Dr Coelho Client    CB#: [phone]    Escalation: Care/asking for call back about what she is to order over [employer]/not the shoe/something for dressing changes she is performing on her  daily  Please call back with answer   Thanks

## 2023-05-11 NOTE — TELEPHONE ENCOUNTER
I spoke to Mrs Gumaro Heck and she ordered the shoe yesterday and will order the DermaGran gauze today  She thanked me for the call back   Thanks

## 2023-05-16 ENCOUNTER — OFFICE VISIT (OUTPATIENT)
Dept: PODIATRY | Facility: CLINIC | Age: 63
End: 2023-05-16

## 2023-05-16 VITALS
SYSTOLIC BLOOD PRESSURE: 147 MMHG | HEART RATE: 60 BPM | HEIGHT: 69 IN | DIASTOLIC BLOOD PRESSURE: 76 MMHG | WEIGHT: 212 LBS | BODY MASS INDEX: 31.4 KG/M2

## 2023-05-16 DIAGNOSIS — E11.42 DIABETIC POLYNEUROPATHY ASSOCIATED WITH TYPE 2 DIABETES MELLITUS (HCC): ICD-10-CM

## 2023-05-16 DIAGNOSIS — L97.421 ULCER OF LEFT HEEL, LIMITED TO BREAKDOWN OF SKIN (HCC): Primary | ICD-10-CM

## 2023-05-16 NOTE — PROGRESS NOTES
PATIENT:  Rosita Curtis    1960      ASSESSMENT:     1  Ulcer of left heel, limited to breakdown of skin (HCC)  VAS lower limb arterial duplex, complete bilateral    collagenase (SANTYL) ointment      2  Diabetic polyneuropathy associated with type 2 diabetes mellitus (HCC)  VAS lower limb arterial duplex, complete bilateral    collagenase (SANTYL) ointment          PLAN:  1  Reviewed medical records  Patient was counseled on the condition and diagnosis  2  Will use Santyl  3  Continue off-loading shoe  Stressed on patient compliance about staying off of his feet  4  Wound is not healing well and will send him for arterial study  5  RA in 2 weeks  I have spent a total time of 20 minutes on 05/16/23 in caring for this patient including Instructions for management, Patient and family education, Risk factor reductions, Counseling / Coordination of care and Documenting in the medical record  Subjective:      HPI:   The patients presents for evaluation of left heel ulcer  His condition was not getting better  No increased pain  BS under control  He presents with Globoped shoe today  His wife reports he has been walking a lot  The following portions of the patient's history were reviewed and updated as appropriate: allergies, current medications, past family history, past medical history, past social history, past surgical history and problem list   All pertinent labs and images were reviewed      Past Medical History  Past Medical History:   Diagnosis Date   • Cerebrovascular accident (CVA) due to thrombosis of left middle cerebral artery ([facility] ) 07/29/2018   • Chronic kidney disease    • Diabetes mellitus ([facility] )    • Dialysis patient [facility])     M-W-F   • GERD (gastroesophageal reflux disease)    • Hypercholesteremia    • Hyperlipidemia    • Hypertension    • 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   • Neuropathy    • Obesity    • Osteomyelitis ([facility] )     last assessed 11/4/16 "  • PVC's (premature ventricular contractions)     sees cardiology Dr Agnes camargo   • Stroke [facility])     last weeof July 2018 [address] [facility]   • TIA (transient ischemic attack) 10/28/2018       Past Surgical History  Past Surgical History:   Procedure Laterality Date   • ABDOMINAL SURGERY     • CARDIAC CATHETERIZATION N/A 5/2/2022    Procedure: Cardiac Coronary Angiogram;  Surgeon: Kinjal Anton MD;  [facility]: [facility]ARD[facility]; Service: Cardiology   • CARDIAC CATHETERIZATION N/A 5/2/2022    Procedure: Cardiac pci;  Surgeon: Kinjal Anton MD;  [facility]; Service: Cardiology   • CARDIAC CATHETERIZATION  2/1/2023    Procedure: Cardiac catheterization;  Surgeon: Kinjal Anton MD;  [facility]: [facility] CARDIAC [facility]; Service: Cardiology   • CARDIAC CATHETERIZATION N/A 2/1/2023    Procedure: Cardiac pci;  Surgeon: Kinjal Anton MD;  [facility]: [facility] CARDI[facility]; Service: Cardiology   • CARDIAC CATHETERIZATION N/A 2/1/2023    Procedure: Cardiac Coronary Angiogram;  Surgeon: Kinjal Anton MD;  [facility]: [facility]ARD[facility]; Service: Cardiology   • CARDIAC CATHETERIZATION N/A 2/1/2023    Procedure: Cardiac other-IVUS;  Surgeon: Kinjal Anton MD;  Location: [facility] CARDIAC [facility]; Service: Cardiology   • CHOLECYSTECTOMY      Percutaneous   • COLONOSCOPY     • CYSTOSCOPY     • OTHER SURGICAL HISTORY      \"stimulator to control bowel movements\"   • RI ESOPHAGOGASTRODUODENOSCOPY TRANSORAL DIAGNOSTIC N/A 9/27/2016    Procedure: ESOPHAGOGASTRODUODENOSCOPY (EGD); Surgeon: Tracy Pickard MD;  Location: [facility] GI LAB;   Service: Gastroenterology   • RI LAPAROSCOPY SURG CHOLECYSTECTOMY N/A 2/29/2016    Procedure: LAPAROSCOPIC CHOLECYSTECTOMY ;  Surgeon: Bill Mancuso DO;  Location: [facility] Main OR;  Service: General   • ROTATOR CUFF REPAIR Right    • TOE AMPUTATION Right 10/28/2016    Procedure: 3RD TOE AMPUTATION ;  Surgeon: Mally Delong DPM;  Location: [facility] Main OR;  Service:         Allergies:  Patient " "has no known allergies  Medications:  Current Outpatient Medications   Medication Sig Dispense Refill   • aspirin (ECOTRIN LOW STRENGTH) 81 mg EC tablet Take 81 mg by mouth daily Resume on 8/14       • atorvastatin (LIPITOR) 40 mg tablet TAKE 1 TABLET BY MOUTH EVERY DAY WITH DINNER 90 tablet 1   • Blood Glucose Monitoring Suppl (True Metrix Meter) w/Device KIT Use to test blood sugars 3 times daily 1 kit 0   • Blood Pressure Monitoring (BLOOD PRESSURE CUFF) MISC Use to check blood pressure before taking blood pressure medication and 1 hour after and follow instructions provided in discharge instructions based on the readings  1 each 0   • collagenase (SANTYL) ointment Apply topically daily 90 g 0   • D3-50 1 25 MG (95723 UT) capsule TAKE 1 CAPSULE BY MOUTH ONE TIME PER WEEK 12 capsule 2   • divalproex sodium (DEPAKOTE SPRINKLE) 125 MG capsule TAKE 2 CAPSULES (250 MG TOTAL) BY MOUTH EVERY 12 (TWELVE) HOURS 360 capsule 1   • glucose blood (True Metrix Blood Glucose Test) test strip Use 1 each 3 (three) times a day Use as instructed 300 strip 1   • Insulin Pen Needle (BD Pen Needle Adina U/F) 32G X 4 MM MISC Use 4 times daily 400 each 1   • Insulin Syringe-Needle U-100 (B-D INS SYRINGE 0 5CC/30GX1/2\") 30G X 1/2\" 0 5 ML MISC Inject once daily 100 each 1   • metoprolol succinate (TOPROL-XL) 50 mg 24 hr tablet Take 1 tablet (50 mg total) by mouth 2 (two) times a day 180 tablet 3   • ONETOUCH DELICA LANCETS 44R MISC by Does not apply route 3 (three) times a day 270 each 1   • prasugrel (EFFIENT) tablet Take 1 tablet (5 mg total) by mouth daily 90 tablet 3   • Sevelamer Carbonate 2 4 g PACK VIGOROUSLY MIX CONTENTS OF 1 PACKET IN WATER (IT WILL NOT DISSOLVE) AND DRINK 3 TIMES DAILY WITH MEALS       No current facility-administered medications for this visit         Social History:  Social History     Socioeconomic History   • Marital status: /Civil Union     Spouse name: None   • Number of children: None   • Years of " "education: None   • Highest education level: Not asked   Occupational History   • Occupation: disabled   Tobacco Use   • Smoking status: Never   • Smokeless tobacco: Never   Vaping Use   • Vaping Use: Never used   Substance and Sexual Activity   • Alcohol use: Not Currently   • Drug use: No   • Sexual activity: Not Currently   Other Topics Concern   • None   Social History Narrative    Daily caffeine consumption 2-3 servings a day     Social Determinants of Health     Financial Resource Strain: Not on file   Food Insecurity: No Food Insecurity   • Worried About Running Out of Food in the Last Year: Never true   • Ran Out of Food in the Last Year: Never true   Transportation Needs: No Transportation Needs   • Lack of Transportation (Medical): No   • Lack of Transportation (Non-Medical): No   Physical Activity: Not on file   Stress: Not on file   Social Connections: Not on file   Intimate Partner Violence: Not on file   Housing Stability: Low Risk    • Unable to Pay for Housing in the Last Year: No   • Number of Places Lived in the Last Year: 1   • Unstable Housing in the Last Year: No        Review of Systems   Constitutional: Negative for chills and fever  Respiratory: Negative for cough and shortness of breath  Cardiovascular: Negative for chest pain  Gastrointestinal: Negative for constipation, diarrhea, nausea and vomiting  Neurological: Positive for numbness  Objective:      /76   Pulse 60   Ht 5' 9\" (1 753 m)   Wt 96 2 kg (212 lb)   BMI 31 31 kg/m²          Physical Exam  Vitals reviewed  Constitutional:       General: He is not in acute distress  Appearance: He is well-developed  He is not toxic-appearing or diaphoretic  Cardiovascular:      Rate and Rhythm: Normal rate and regular rhythm  Pulses:           Dorsalis pedis pulses are 1+ on the right side and 1+ on the left side  Posterior tibial pulses are 0 on the right side and 0 on the left side     Pulmonary:    " Effort: Pulmonary effort is normal  No respiratory distress  Musculoskeletal:         General: Deformity present  No tenderness  Right foot: No foot drop  Left foot: No foot drop  Comments: Hammertoe deformity noted  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  Feet:      Right foot:      Skin integrity: Dry skin present  No ulcer, skin breakdown, erythema, warmth or callus  Left foot:      Skin integrity: Dry skin present  No ulcer, skin breakdown, erythema, warmth or callus  Skin:     General: Skin is warm  Capillary Refill: Capillary refill takes less than 2 seconds  Coloration: Skin is not cyanotic or mottled  Findings: No ecchymosis  Nails: There is no clubbing  Comments: Ulcer presents left heel  It is 1 7 X 0 4 X 0 3 cm  Wound bed is fibrous  No cellulitis  Neurological:      General: No focal deficit present  Mental Status: He is alert and oriented to person, place, and time  Cranial Nerves: No cranial nerve deficit  Sensory: Sensory deficit present  Motor: No weakness  Psychiatric:         Mood and Affect: Mood normal          Behavior: Behavior normal          Thought Content:  Thought content normal          Judgment: Judgment normal

## 2023-05-17 NOTE — PROGRESS NOTES
"    [facility]  Occupational Therapy Treatment Note    Date: 5/17/2023  Name: Laverne Humphries  [facility] Number: [id number]    Therapy Diagnosis:   Encounter Diagnoses   Name Primary?    Localized edema Yes    Decreased range of motion of right shoulder     Pain            Physician: Luis Angel Wheeler MD    Physician Orders:Eval and treat;  s/p RCR/lymphedema treatments into hand  Medical Diagnosis: Z98.890 (ICD-10-CM) - S/P right rotator cuff repair  Surgical Procedure and Date: 3/21/2023,   1. Right shoulder Arthroscopic massive complex rotator cuff repair    2. Right shoulder Arthroscopic extensive debridement   3. Right shoulder Arthroscopic lysis of adhesions   4. Right shoulder Arthroscopic subacromial decompression and bursectomy   5. Right shoulder arthroscopic loose body removal    Evaluation Date: 5/3/2023  Insurance Authorization Period Expiration: 05/07/2023  Plan of Care Expiration: 12 weeks; 7/28/2023  Date of Return to MD: 7/3/2023  Visit # / Visits authorized: 1 / 1  FOTO: (date and score)     Precautions:  Standard     Time In:     01:00      pM   Time Out:  02:00     pM   Total Appointment Time (timed & untimed codes): 60 minutes     8 weeks 1 day s/p (5/17/2023)   SUBJECTIVE     Pt reports: "Had pain on Saturday but I switched my pillow and the next morning it was okay."   She was compliant with home exercise program given last session.   Response to previous treatment: increased composite fist   Functional change: pulling up pants with B hands     Pain: 0/10  Location: right hand     OBJECTIVE   Objective Measures updated at progress report unless specified.    Observation/Appearance: mod edema in hand      Edema. Measured in centimeters.    5/9/2023 5/9/2023     Left Right   2in. Above elbow       2in. Below elbow       Wrist Crease 14.0 cm  16.0 cm    Figure of 8       MCPs 18.4.0 cm  20.5 cm            Shoulder ROM. Measured in degrees    5/3/2022 5/3/2023 5/11/2023 " Daily Note     Today's date: 2018  Patient name: Tommy Menjivar  : 1960  MRN: [id number]  Referring provider: Gabby Byers MD  Dx:   Encounter Diagnosis   Name Primary?  History of ischemic cerebrovascular accident (CVA) with residual deficit Yes                  Subjective: "thank you for everything "      Objective: See treatment below  UEB at 2 0 resistance prograde and retrograde  Supine neuro re-ed with 2# weighted bar  Seated FMC with coins  Assessment: Tolerated treatment well  Continued to require redirection back to tasks  Direction following presented with slight improvement this session as PARKER did not have to repeat directions more than 2 times for each task        Plan: Continued skilled OT per POC    INTERVENTION COMMENTS:  Diagnosis: History of ischemic cerebrovascular accident (CVA) with residual deficit [I69 30]  Precautions: fall risk, impulsive, /VM impairments   FOTO:  6 of 24 visits, PN due      Left Right PROM Right PROM   Shoulder Flexion   85 degrees  90   Shoulder Abduction   NT 80   Shoulder Extension    NT NT    Shoulder IR   NT NT   Shoulder ER   NT 20         Elbow and Wrist ROM. Measured in degrees.    5/9/2023 5/9/2023     Left Right   Elbow Ext/Flex WNL/WNL  12/135    Supination/Pronation WNL/WNL  66/75   Wrist Ext/Flex 55/70 39/6   Wrist RD/UD             Hand ROM. Measured in degrees.    5/9/2023 5/9/2023     Left Right           Index: MP    68              PIP       50              DIP   30              PITTS   148           Long:  MP   65              PIP   58              DIP   62              PITTS   185           Ring:   MP   55              PIP   60              DIP   32              PITTS   147                  Strength (Dynamometer) and Pinch Strength (Pinch Gauge)  Measured in pounds.    5/9/2023 5/9/2023     Left Right   Rung II   Deferred    Key Pinch       3pt Pinch       2pt Pinch             Manual Muscle Test    5/9/2023 5/9/2023     Left Right   Wrist Extension        Wrist Flexion       Radial Deviation       Ulnar Deviation       Supination       Pronation       Elbow Extension       Elbow Flexion             Limitation/Restriction for FOTO initial eval; shoulder Survey     Therapist reviewed FOTO scores for Laverne Humphries on 5/3/2023.   FOTO documents entered into [employer] - see Media section.     Limitation Score: needs to be taken%             Treatment     Leovidia received the treatments listed below:       Supervised modalities: MHP for 10 minutes to promote increased blood flow       Manual therapy techniques: Manual Lymphatic Drainage were applied to the: RUE for 30 minutes, including:  - retrograde massage to RUE  - scar tissue mobilization  - passive  gentle ROM to flexion/scaption/abduction/ER/IR  x 10 reps each (2 sets)      Therapeutic exercises to develop ROM for 25 minutes, including:  - shoulder rolls forward/backward x 10 reps each (2 sets)   - pendulums  (CW/CCW, forward/backward, side/side) x 10 reps each (2 sets)   - shoulder raises x 10 reps (2 sets)   - scap retraction/protraction x 10 reps (2 sets)  - towel walks for grasping (1 min)         Patient Education and Home Exercises      Education provided:   - pillow under arm when sitting down watching TV   - Progress towards goals     Written Home Exercises Provided: Patient instructed to cont prior HEP.  Exercises were reviewed and Laverne was able to demonstrate them prior to the end of the session.  Laverne demonstrated good  understanding of the HEP provided. See EMR under Patient Instructions for exercises provided during therapy sessions.      ASSESSMENT     Demonstrated increased composite fist today secondary to decreased edema. Able to alex min increase in shoulder passive ROM today. Tightness with scaption.     Laverne is progressing well towards her goals and there are no updates to goals at this time. Pt prognosis is fair.     Pt will continue to benefit from skilled outpatient occupational therapy to address the deficits listed in the problem list on initial evaluation, provide pt/family education and to maximize pt's level of independence in the home and community environment.     Pt's spiritual, cultural and educational needs considered and pt agreeable to plan of care and goals.    Anticipated barriers to occupational therapy: preexisting conditions     Goals:  Long term goals:   Pt will achieve shoulder AROM in flexion/scaption/abduction ~120 degrees   2.   Assess MMT when appropriate  3.   Pt will demo shoulder AROM WFL to perform daily and community activities   4.   Pt will report improvement in FOTO score by measuring 20% points.         Short term goals:   Pt will be complaint with HEP and pain management   Pt will achieve shoulder PROM in flexion and scaption ~110 degrees   Pt will report improvement in FOTO by decreasing 10% limitation points   Pt will achieve shoulder AAROM WFL to aid  in ADLs   Pt will be able to demo full composite fist with R hand.    PLAN     Continue skilled occupational therapy with individualized plan of care focusing on improving functional independence with use of RUE    Updates/Grading for next session: cont per large massive type III protocol     Shaunna Stevenson, OT

## 2023-05-18 ENCOUNTER — TELEPHONE (OUTPATIENT)
Dept: PODIATRY | Facility: CLINIC | Age: 63
End: 2023-05-18

## 2023-05-18 NOTE — TELEPHONE ENCOUNTER
Caller: Deedee/Carmen    Doctor/Office: Julio Cummings DPM    #: [phone]    Escalation: [facility] called to get the measurements for the ulcer so they can fill the Santyl

## 2023-05-19 ENCOUNTER — TELEPHONE (OUTPATIENT)
Dept: CARDIOLOGY CLINIC | Facility: CLINIC | Age: 63
End: 2023-05-19

## 2023-05-19 NOTE — PROGRESS NOTES
Consultation - Electrophysiology-Cardiology (EP)   Lupe Apple 61 y o  male MRN: [id number]  Unit/Bed#:  Encounter: [id number]      5  Ischemic cardiomyopathy  POCT ECG    Ambulatory Referral to Infectious Disease    Ambulatory Referral to Podiatry    Ambulatory Referral to Cardiology      2  Penetrating foot wound, left, initial encounter  Ambulatory Referral to Infectious Disease    Ambulatory Referral to Podiatry    Ambulatory Referral to Cardiology      3  Diabetic polyneuropathy associated with type 2 diabetes mellitus [facility])  Ambulatory Referral to Infectious Disease    Ambulatory Referral to Podiatry    Ambulatory Referral to Cardiology      4  Type 2 diabetes mellitus with chronic kidney disease on chronic dialysis, without long-term current use of insulin [facility])  Ambulatory Referral to Infectious Disease    Ambulatory Referral to Podiatry    Ambulatory Referral to Cardiology      5  Hammer toes of both feet  Ambulatory Referral to Infectious Disease    Ambulatory Referral to Podiatry    Ambulatory Referral to Cardiology      6  Ulcer of left heel, limited to breakdown of skin [facility])  Ambulatory Referral to Infectious Disease    Ambulatory Referral to Podiatry    Ambulatory Referral to Cardiology      7  Absence of toe of right foot [facility])  Ambulatory Referral to Infectious Disease    Ambulatory Referral to Podiatry    Ambulatory Referral to Cardiology      8  Diabetic macular edema ([facility] )        9  Primary hypertension        10  CAD, multiple vessel        11  Pre-syncope        12  Type 1 non-ST elevation myocardial infarction (NSTEMI) s/p ADE to in-stent restenosis of mid LAD        13  Nonrheumatic aortic valve stenosis        14  ESRD on dialysis ([facility] )        15  Mixed hyperlipidemia        16  History of stroke        16  S/P arteriovenous (AV) fistula creation        18  Pre-kidney transplant, listed        23   Presence of external cardiac defibrillator              Consults  Physician Requesting Consult: Jodie Francisco MD   Reason for Consult / Principal Problem: Ischemic cardiomyopathy      Summary of my recommendation for the patient  The patient will be maximally benefited with a BiV ICD + CHF medications at this time   LVEF 35%, QRS RBBB 156 ms  It will be a right-sided device as he has hemodialysis fistula on the left side    There is going to be a very high chance of infection -at the current time from hemodialysis, subsequently if he gets renal transplant from immunosuppression  Patient does have osteomyelitis existing at this time    Patient is going to follow-up with primary cardiologist    Advised heart failure evaluation -trial of low-dose Ely Thomas /any role of SGLT2 inhibitor in the patient on hemodialysis  If EF improves beyond 35% with any additional medication, the patient can avoid high risk defibrillator placement    Clearance from podiatry Dr Heather García before implantation of any device -that osteomyelitis has cleared  Clearance from infectious disease if so recommended by podiatry    Recommend aggressive heart failure therapy + complete clearance of infection + persistence of low EF -before proceeding with BiV ICD            Clinical conditions  CAD, multiple PCI-stent for in-stent restenosis of LAD  Ischemic cardiomyopathy, LVEF 35%  PVC  Diabetes mellitus-2, long-term use of insulin  CVA, thrombosis of left MCA 2018  Chronic kidney disease, ESRD on dialysis  Mild to moderate aortic stenosis  GERD  Mixed hyperlipidemia  Hypertension  Viral hepatitis  Diabetic polyneuropathy  Obesity with BMI 31  Osteomyelitis              Assessment/Plan     CAD, multiple PCI-stent for in-stent restenosis of LAD  Ischemic cardiomyopathy, LVEF 35%  PVC    This is a very complex situation    The patient will be maximally benefited with a BiV ICD + CHF medications at this time   LVEF 35%, QRS RBBB 156 ms  It will be a right-sided device as he has hemodialysis fistula on the left side    There is going to be a very high chance of infection -at the current time from hemodialysis, subsequently if he gets renal transplant from immunosuppression  Patient does have osteomyelitis existing at this time    Patient is going to follow-up with primary cardiologist    Advised heart failure evaluation -trial of low-dose Kaycee Cumber /any role of SGLT2 inhibitor in the patient on hemodialysis  If EF improves beyond 35% with any additional medication, the patient can avoid high risk defibrillator placement    Clearance from podiatry Dr Ephraim Leon before implantation of any device -that osteomyelitis has cleared  Clearance from infectious disease if so recommended by podiatry    Recommend aggressive heart failure therapy + complete clearance of infection + persistence of low EF -before proceeding with BiV ICD            Diabetes mellitus-2, long-term use of insulin  CAD and ischemic cardiomyopathy  CVA, thrombosis of left MCA 2018  Chronic kidney disease, ESRD on dialysis  Diabetic polyneuropathy  Osteomyelitis  Reticulocyte macular edema  Multiple complications from diabetes-macrovascular and microvascular  On insulin  Follow-up with endocrine        Mild to moderate aortic stenosis  Latest echocardiogram reviewed   follow-up with primary cardiology      Mixed hyperlipidemia  On atorvastatin  No myositis or myalgia      Hypertension  On metoprolol succinate 50 mg 2 times daily  May need titration to allow use of Entresto  Referral to heart failure services      Obesity with BMI 31  Continue nutritional management      Osteomyelitis  As per podiatry  Also have evaluation by infectious disease if podiatry recommends        History of Present Illness   HPI: Daniel Sinha is a 61y o  year old male has been referred to me by Dr Baldemar Garcia for the evaluation and management of ischemic cardiomyopathy      The patient has significant medical illnesses which include  CAD, multiple PCI-stent for in-stent restenosis of LAD  Ischemic cardiomyopathy, LVEF 35%  PVC  Diabetes mellitus-2, long-term use of insulin  CVA, thrombosis of left MCA 2018  Chronic kidney disease, ESRD on dialysis  Mild to moderate aortic stenosis  GERD  Mixed hyperlipidemia  Hypertension  Viral hepatitis  Diabetic polyneuropathy  Obesity with BMI 31  Osteomyelitis      The patient has significant coronary artery disease  He has a LifeVest in place  He is on therapy for heart failure  He is not complaining of active ongoing angina, orthopnea or PND  He does have intermittent leg swelling  He is not complaining of palpitations, presyncope or syncope at this time  He does have exertional intolerance    He has a longstanding history of diabetes in both micro and macrovascular complication  Patient has a history of stroke    He is also has end-stage renal disease and is on hemodialysis from left arm AV fistula  He does have diabetic macular edema  He has  peripheral polyneuropathy    Patient is extremely emotionally labile today    He has ongoing osteomyelitis  And prior toe amputation  He has been taken care of by podiatry          Historical Information   Past Medical History:   Diagnosis Date   • Cerebrovascular accident (CVA) due to thrombosis of left [facility] 07/29/2018   • Chronic kidney disease    • Diabetes mellitus ([facility] )    • Dialysis patient [facility])     M-W-F   • GERD (gastroesophageal reflux disease)    • Hypercholesteremia    • Hyperlipidemia    • Hypertension    • 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   • Neuropathy    • Obesity    • Osteomyelitis ([facility] )     last assessed 11/4/16   • PVC's (premature ventricular contractions)     sees cardiology Dr Roberta camargo   • Stroke [facility])     last weeof July 2018 [address]   • TIA (transient ischemic attack) 10/28/2018     Past Surgical History:   Procedure Laterality Date   • ABDOMINAL SURGERY     • CARDIAC CATHETERIZATION N/A 5/2/2022    Procedure: Cardiac Coronary Angiogram;  Surgeon: "Jeramie Rae MD;  Location: [facility] CARDIAC CATH LAB; Service: Cardiology   • CARDIAC CATHETERIZATION N/A 5/2/2022    Procedure: Cardiac pci;  Surgeon: Jeramie Rae MD;  Location: [facility] CARDIAC CATH LAB; Service: Cardiology   • CARDIAC CATHETERIZATION  2/1/2023    Procedure: Cardiac catheterization;  Surgeon: Jeramie Rae MD;  Location: [facility] CARDIAC CATH LAB; Service: Cardiology   • CARDIAC CATHETERIZATION N/A 2/1/2023    Procedure: Cardiac pci;  Surgeon: Jeramie Rae MD;  Location: [facility] CARDIAC CATH LAB; Service: Cardiology   • CARDIAC CATHETERIZATION N/A 2/1/2023    Procedure: Cardiac Coronary Angiogram;  Surgeon: Jeramie Rae MD;  Location: [facility] CARDIAC CATH LAB; Service: Cardiology   • CARDIAC CATHETERIZATION N/A 2/1/2023    Procedure: Cardiac other-IVUS;  Surgeon: Jeramie Rae MD;  Location: [facility] CARDIAC CATH LAB; Service: Cardiology   • CHOLECYSTECTOMY      Percutaneous   • COLONOSCOPY     • CYSTOSCOPY     • OTHER SURGICAL HISTORY      \"stimulator to control bowel movements\"   • NV ESOPHAGOGASTRODUODENOSCOPY TRANSORAL DIAGNOSTIC N/A 9/27/2016    Procedure: ESOPHAGOGASTRODUODENOSCOPY (EGD); Surgeon: Lalita Jones MD;  Location: [facility] GI LAB;   Service: Gastroenterology   • NV LAPAROSCOPY SURG CHOLECYSTECTOMY N/A 2/29/2016    Procedure: LAPAROSCOPIC CHOLECYSTECTOMY ;  Surgeon: Kalpana Moura DO;  Location: [facility] Main OR;  Service: General   • ROTATOR CUFF REPAIR Right    • TOE AMPUTATION Right 10/28/2016    Procedure: 3RD TOE AMPUTATION ;  Surgeon: Boni Cee DPM;  Location: [facility] Main OR;  Service:      Social History     Substance and Sexual Activity   Alcohol Use Not Currently     Social History     Substance and Sexual Activity   Drug Use No     Social History     Tobacco Use   Smoking Status Never   Smokeless Tobacco Never     Social History     Socioeconomic History   • Marital status: /Civil Union     Spouse name: Not on file   • Number of children: Not on file   • Years of education: Not on file   • " "Highest education level: Not asked   Occupational History   • Occupation: disabled   Tobacco Use   • Smoking status: Never   • Smokeless tobacco: Never   Vaping Use   • Vaping Use: Never used   Substance and Sexual Activity   • Alcohol use: Not Currently   • Drug use: No   • Sexual activity: Not Currently   Other Topics Concern   • Not on file   Social History Narrative    Daily caffeine consumption 2-3 servings a day     Social Determinants of Health     Financial Resource Strain: Not on file   Food Insecurity: No Food Insecurity   • Worried About Running Out of Food in the Last Year: Never true   • Ran Out of Food in the Last Year: Never true   Transportation Needs: No Transportation Needs   • Lack of Transportation (Medical): No   • Lack of Transportation (Non-Medical): No   Physical Activity: Not on file   Stress: Not on file   Social Connections: Not on file   Intimate Partner Violence: Not on file   Housing Stability: Low Risk    • Unable to Pay for Housing in the Last Year: No   • Number of Places Lived in the Last Year: 1   • Unstable Housing in the Last Year: No        Family History:  Family History   Problem Relation Age of Onset   • Leukemia Mother    • Liver disease Mother    • Lung cancer Mother         heavy smoker - 3 ppd   • Heart disease Father    • Liver disease Father    • Diabetes type I Father    • Multiple myeloma Sister    • Breast cancer Sister    • Urolithiasis Family    • Alcohol abuse Neg Hx    • Depression Neg Hx    • Drug abuse Neg Hx    • Substance Abuse Neg Hx    • Mental illness Neg Hx          Meds/Allergies      No current facility-administered medications for this visit  (Not in a hospital admission)      No Known Allergies        Objective   Vitals:   Visit Vitals  Pulse 63   Ht 5' 9\" (1 753 m)   BMI 31 31 kg/m²   Smoking Status Never   BSA 2 12 m²      There were no vitals filed for this visit  [unfilled]    Invasive Devices     Line  Duration           Hemodialysis AV " Fistula Left Upper arm -- days                  ROS  Review of Systems   All other systems reviewed and are negative  As discussed in detail in my history of present illness      PHYSICAL EXAM  Physical Exam  Vitals reviewed  Constitutional:       General: He is not in acute distress  Appearance: Normal appearance  He is obese  He is ill-appearing  HENT:      Head: Normocephalic and atraumatic  Right Ear: External ear normal       Left Ear: External ear normal       Nose: Nose normal       Mouth/Throat:      Comments: Posterior pharynx is crowded  Eyes:      General: No scleral icterus  Comments: Patient does have normal conjunctiva  There is loss of vision from macular edema   Cardiovascular:      Rate and Rhythm: Normal rate and regular rhythm  Heart sounds: Murmur heard  Pulmonary:      Effort: No respiratory distress  Breath sounds: Normal breath sounds  Abdominal:      Palpations: Abdomen is soft  Musculoskeletal:      Cervical back: Neck supple  No rigidity  Comments: Left forearm AV fistula  Amputation of toe  Osteomyelitis   Skin:     Coloration: Skin is not jaundiced  Findings: No bruising  Comments: Left forearm AV fistula  Amputation of toe  Osteomyelitis   Neurological:      Mental Status: He is oriented to person, place, and time  Mental status is at baseline  Psychiatric:         Mood and Affect: Mood normal          Behavior: Behavior normal          Thought Content:  Thought content normal          Judgment: Judgment normal                LAB RESULTS:      CBC:  Results from Last 12 Months   Lab Units 03/27/23  1830 03/10/23  0515 03/09/23  0541 03/08/23  1455 03/06/23  2028 02/11/23  0238 02/02/23  0615 02/01/23  0729   WBC Thousand/uL 6 14 5 84 5 30 4 58 8 08 7 21   < > 8 27   HEMOGLOBIN g/dL 9 7* 10 3* 10 2* 10 8* 10 6* 10 5*   < > 10 7*   HEMATOCRIT % 31 2* 32 2* 32 8* 34 9* 33 0* 33 1*   < > 33 7*   MCV fL 98 94 96 95 95 95   < > 95   PLATELETS Thousands/uL 159 159 142* 138* 138* 221   < > 180   MCH pg 30 4 30 0 29 7 29 5 30 5 30 0   < > 30 1   MCHC g/dL 31 1* 32 0 31 1* 30 9* 32 1 31 7   < > 31 8   RDW % 17 2* 14 7 14 8 15 1 15 3* 14 7   < > 15 3*   MPV fL 10 6 10 1 10 0 10 2 9 7 10 5   < > 10 3   NRBC AUTO /100 WBCs 0  --   --  0 0 0  --  0    < > = values in this interval not displayed  CMP:  Results from Last 12 Months   Lab Units 03/27/23  1830 03/10/23  0515 03/09/23  0541 03/08/23  1455 03/06/23  2028 02/11/23  0238 02/01/23  0729 01/31/23  0521 01/30/23  1505 01/30/23  0458   POTASSIUM mmol/L 3 9 4 7 3 9 4 3 4 2 3 9   < > 4 3  --  5 2   CHLORIDE mmol/L 95* 100 99 97 94* 94*   < > 93*  --  98   CO2 mmol/L 33* 21 27 31 32 34*   < > 28  --  25   CO2, I-STAT mmol/L  --   --   --   --   --   --   --   --  25  --    BUN mg/dL 14 62* 48* 39* 28* 29*   < > 52*  --  68*   CREATININE mg/dL 3 94* 8 37* 6 86* 5 21* 4 72* 5 49*   < > 7 29*  --  9 62*   GLUCOSE, ISTAT mg/dl  --   --   --   --   --   --   --   --  142*  --    CALCIUM mg/dL 9 2 9 2 9 4 9 6 9 7 9 7   < > 8 9  --  8 8   AST U/L  --   --  10  --  8* 10  --  21  --  27   ALT U/L  --   --  12  --  6* 18  --  13  --  13   ALK PHOS U/L  --   --  62  --  57 97  --  56  --  60   EGFR ml/min/1 73sq m 15 6 7 10 12 10   < > 7  --  5    < > = values in this interval not displayed  Magnesium:   Results from Last 12 Months   Lab Units 03/09/23  0541 02/02/23  0615 02/01/23  0729 01/31/23  0521 01/29/23  0821   MAGNESIUM mg/dL 2 6 3 6* 3 2* 3 0* 1 5*       A1C:  Results from Last 12 Months   Lab Units 04/18/23  1537 12/23/22  0744 07/25/22  0710 06/20/22  0829   HEMOGLOBIN A1C  4 9 5 5 6 0* 5 5        TSH:  No results for input(s): TSH in the last 8784 hours      TSH 3rd Gen:  Results from Last 12 Months   Lab Units 01/29/23  0821 01/25/23  1958   TSH 3RD GENERATON uIU/mL 1 966 2 455        PT/INR:  Results from Last 12 Months   Lab Units 01/29/23  7288 10/20/22  2351 07/25/22  0710 06/20/22  7932 06/10/22  1236   PROTIME seconds 13 7 13 5 13 5 12 8 13 3   INR  1 03 1 01 1 01 1 00 1 01       Lipid Panel:  Results from Last 12 Months   Lab Units 01/30/23  0458 07/25/22  0710 06/20/22  0829 06/02/22  0723   CHOLESTEROL mg/dL 76 82 92 80   TRIGLYCERIDES mg/dL 123 151* 182* 133   HDL mg/dL 30* 33* 31* 27*   NON-HDL-CHOL (CHOL-HDL) mg/dl 46 49 61 53      CRP  3/27/2023    Component Ref Range & Units 3/27/23  6:30 PM 3/6/23  8:28 PM 1/29/23 10:52 PM 1/2/21  6:12 AM 1/1/21  6:05 AM 12/31/20  6:36 AM 12/30/20  9:09 PM   CRP <3 0 mg/L 16 4 High   156 1 High   123 2 High   75 8 High   149  1 High   78 6 High   62 7 High           Troponin:  No results for input(s): TROPONINI in the last 8784 hours  Imaging:     Imaging:    EDD  No results found for this or any previous visit  Echo follow-up limited  4/24/2023    •  Left Ventricle: Left ventricular cavity size is mildly dilated  Wall thickness is mildly increased  There is mild concentric hypertrophy  The left ventricular ejection fraction is 35%  Systolic function is moderately reduced  There is moderate global hypokinesis with regional variation  Diastolic function is severely abnormal, consistent with ungraded restrictive relaxation  •  Right Ventricle: Right ventricular cavity size is mildly dilated  •  Left Atrium: The atrium is moderately dilated  •  Aortic Valve: The aortic valve is trileaflet  The leaflets are moderately thickened  The leaflets are moderately calcified  There is moderately reduced mobility  There is mild regurgitation  There is moderate stenosis  The aortic valve peak velocity is 2 31 m/s  The aortic valve mean gradient is 11 mmHg  The dimensionless velocity index is 0 35  The aortic valve area is 1 81 cm2  The stroke volume index is 45 50 ml/m2  •  Mitral Valve: There is moderate annular calcification  There is moderate regurgitation  •  Pulmonic Valve: There is mild regurgitation  •  Tricuspid Valve: There is mild regurgitation  The right ventricular systolic pressure is mildly elevated  The estimated right ventricular systolic pressure is 92 85 mmHg      Findings    Left Ventricle Left ventricular cavity size is mildly dilated  Wall thickness is mildly increased  There is mild concentric hypertrophy  The left ventricular ejection fraction is 35%  Systolic function is moderately reduced  There is moderate global hypokinesis with regional variation  Diastolic function is severely abnormal, consistent with ungraded restrictive relaxation  Right Ventricle Right ventricular cavity size is mildly dilated  Systolic function is normal    Left Atrium The atrium is moderately dilated  Right Atrium The atrium is normal in size  Aortic Valve The aortic valve is trileaflet  The leaflets are moderately thickened  The leaflets are moderately calcified  There is moderately reduced mobility  There is mild regurgitation  There is moderate stenosis  The aortic valve peak velocity is 2 31 m/s  The aortic valve mean gradient is 11 mmHg  The dimensionless velocity index is 0 35  The aortic valve area is 1 81 cm2  The stroke volume index is 45 50 ml/m2  Mitral Valve There is moderate annular calcification  There is moderate regurgitation  There is no evidence of stenosis  Tricuspid Valve Tricuspid valve structure is normal  There is mild regurgitation  There is no evidence of stenosis  The right ventricular systolic pressure is mildly elevated  The estimated right ventricular systolic pressure is 24 16 mmHg  Pulmonic Valve The pulmonic valve was not well visualized  There is mild regurgitation  There is no evidence of stenosis  Ascending Aorta The aortic root is normal in size  IVC/SVC The right atrial pressure is estimated at 3 0 mmHg  The inferior vena cava is normal in size  Respirophasic changes were normal    Pericardium There is no pericardial effusion  The pericardium is normal in appearance     Pulmonary Veins There is systolic blunting in the pulmonary veins  Echocardiogram  Results for orders placed during the hospital encounter of 01/29/23    Echo complete w/ contrast if indicated    Interpretation Summary  •  Left Ventricle: Left ventricular cavity size is normal  Wall thickness is mildly increased  There is mild concentric hypertrophy  The left ventricular ejection fraction is 25-30%  Systolic function is severely reduced  There is mild global hypokinesis  •  Right Ventricle: Right ventricular cavity size is normal  Systolic function is normal   •  Aortic Valve: The aortic valve is trileaflet  The leaflets are moderately thickened  The leaflets are moderately calcified  There is moderately reduced mobility  There is mild regurgitation  There is moderate stenosis  The aortic valve peak velocity is 1 87 m/s  The aortic valve peak gradient is 14 mmHg  The aortic valve mean gradient is 9 mmHg  The dimensionless velocity index is 0 36  The aortic valve area is 1 97 cm2  •  Mitral Valve: There is severe annular calcification  There is moderate regurgitation  •  The following segments are akinetic: basal inferior and mid inferior  •  The following segments are hypokinetic: basal anterior, basal anteroseptal, basal inferoseptal, basal inferolateral, basal anterolateral, mid anterior, mid anteroseptal, mid inferoseptal, mid inferolateral, mid anterolateral, apical anterior, apical septal, apical inferior, apical lateral and apex  •  Tricuspid Valve: The right ventricular systolic pressure is moderately elevated  The estimated right ventricular systolic pressure is 59 94 mmHg  Stress Test:   Results for orders placed during the hospital encounter of 04/14/22    NM myocardial perfusion spect (rx stress and/or rest)    Interpretation Summary  •  Stress ECG: A pharmacological stress test was performed using regadenoson   The patient experienced no angina during the test   •  Stress ECG: The stress ECG is negative for ischemia after pharmacologic stress  •  Perfusion: There is a left ventricular perfusion defect that is medium in size with severe reduction in uptake present in the basal to mid inferior and inferolateral location(s) consistent with prior scar  •  Stress Function: Left ventricular function post-stress is normal  Post-stress ejection fraction is 46 %  There is a defect in the basal to mid inferior and inferolateral location(s)  •  Stress Combined Conclusion: Findings are consistent with inferolateral infarction  No results found for this or any previous visit  Cardiac catheterization   2/1/2023    Coronary artery disease involving native coronary artery of native heart without angina pectoris [I25 10 (ICD-10-CM)]   Elevated troponin [R77 8 (ICD-10-CM)]     Impression       •  1st Mrg lesion is 100% stenosed  •  RPAV lesion is 100% stenosed  •  Mid LAD lesion is 80% stenosed  •  RPDA lesion is 60% stenosed  •  The angioplasty was pre-stent angioplasty  •  The angioplasty was pre-stent angioplasty      Multivessel CAD with marked progression since the prior angiographic study in May 2022  Stents placed in the mid LAD exhibited significant discrete and-stent restenosis  The first OM branch, stented in 5/22, has become occluded  The distal RCA beyond the PDA has akso become occluded  LAD in--stent restenosis was interrogated by [facility] imaging and successfully treated with placement of a 4 0x13mm Orsiro AED  An attempt was made to wire the occluded OM branch, but the wire could not be advanced beyond the stent in mid vessel  Plan: DAPT, statin, compliance with medical and hemodialysis therapy, increased activity, weight loss  Discussed in detail with the patient's family  Coronary Findings    Diagnostic  Dominance: Right  Left Anterior Descending   The vessel is large  There is moderate diffuse disease throughout the vessel  Mid LAD lesion is 80% stenosed  Culprit lesion  JALIL flow is 3   The lesion is within stent  Ultrasound ([facility]) was performed on the Mid LAD  Left Circumflex      First Obtuse Marginal Branch   1st Mrg lesion is 100% stenosed  Not the culprit lesion  JALIL flow is 1  The lesion was previously treated using a stent of unknown type  Right Coronary Artery      Right Posterior Descending Artery   RPDA lesion is 60% stenosed  JALIL flow is 3  Right Posterior Atrioventricular Artery   RPAV lesion is 100% stenosed  JALIL flow is 0  Intervention        Mid LAD lesion   Angioplasty   Angioplasty using a compliant balloon was performed prior to stent deployment  The balloon used was a [employer] PTCA RX TREK 3 X 20MM  Maximum pressure: 18 alvaro  Inflation time: 90 sec  Time obtained: 17:15 EST  Supplies used: GUIDEWIRE RUNTHROUGH 180CM; CATH GUIDE LAUNCHER 6FR EBU 3 5   Angioplasty   Angioplasty using a noncompliant balloon was performed prior to stent deployment  The balloon used was a [employer] TREK NC HARJINDER 3 50 X 15MM RAPD EXCH  Maximum pressure: 12 alvaro  Inflation time: 15 sec  Time obtained: 17:26 EST  First reinflation; Max pressure: 18 alvaro  Inflation time 30 sec  Time obtained: 17:26 EST  Second reinflation; Max pressure: 16 alvaro  Inflation time 16 sec  Time obtained: 17:28 EST  Supplies used: GUIDEWIRE RUNTHROUGH 180CM; CATH GUIDE LAUNCHER 6FR EBU 3 5   Stent   Drug-eluting stent was successfully placed  The stent used was a STENT [employer] DRUG ELUTING 4 0 X 13 MM  Stent was deployed by way of balloon expansion  Maximum pressure: 16 alvaro  Inflation time: 30 sec  Supplies used: GUIDEWIRE RUNTHROUGH 180CM; CATH GUIDE LAUNCHER 6FR EBU 3 5   Post-Intervention Lesion Assessment   Post-intervention JALIL flow is 3  There is a 0% residual stenosis post intervention  HOLTER MONITOR: 24 HOUR/48 HOUR MONITORS  No results found for this or any previous visit  AMB extended holter monitor  No results found for this or any previous visit        DEVICE CHECK:       No results found for this or any previous visit          Code Status: [unfilled]  Advance Directive and Living Will:      Power of :    POLST:      Counseling / Coordination of Care  Very detailed discussion done with regards to device, risks and possible complication  Patient is going to follow-up with primary cardiologist, heart failure service, podiatry, infectious disease  Once it is deemed absolutely necessary we will proceed with implant    Dora Chapman MD

## 2023-05-19 NOTE — TELEPHONE ENCOUNTER
Son called with an update    Got the approval from kidney transplant team  Entresto until kidney transplant    (The question about entretso is he had been on in the passed but had issues with BP dropping, just a side note)    Got approval for Efffientt will be able to operate while on medication     About debfillator that also will be ok and can proceed with testing    Transplant team c/b [phone]  Son number [phone]      Please advise

## 2023-05-23 ENCOUNTER — CONSULT (OUTPATIENT)
Dept: CARDIOLOGY CLINIC | Facility: CLINIC | Age: 63
End: 2023-05-23

## 2023-05-23 ENCOUNTER — TELEPHONE (OUTPATIENT)
Dept: CARDIOLOGY CLINIC | Facility: CLINIC | Age: 63
End: 2023-05-23

## 2023-05-23 VITALS — HEIGHT: 69 IN | BODY MASS INDEX: 31.31 KG/M2 | HEART RATE: 63 BPM

## 2023-05-23 DIAGNOSIS — Z95.810 PRESENCE OF EXTERNAL CARDIAC DEFIBRILLATOR: ICD-10-CM

## 2023-05-23 DIAGNOSIS — Z98.890 S/P ARTERIOVENOUS (AV) FISTULA CREATION: ICD-10-CM

## 2023-05-23 DIAGNOSIS — E11.311 DIABETIC MACULAR EDEMA (HCC): ICD-10-CM

## 2023-05-23 DIAGNOSIS — M20.41 HAMMER TOES OF BOTH FEET: ICD-10-CM

## 2023-05-23 DIAGNOSIS — Z86.73 HISTORY OF STROKE: ICD-10-CM

## 2023-05-23 DIAGNOSIS — S91.332A PENETRATING FOOT WOUND, LEFT, INITIAL ENCOUNTER: ICD-10-CM

## 2023-05-23 DIAGNOSIS — I25.10 CAD, MULTIPLE VESSEL: ICD-10-CM

## 2023-05-23 DIAGNOSIS — N18.6 ESRD ON DIALYSIS (HCC): ICD-10-CM

## 2023-05-23 DIAGNOSIS — L97.421 ULCER OF LEFT HEEL, LIMITED TO BREAKDOWN OF SKIN (HCC): ICD-10-CM

## 2023-05-23 DIAGNOSIS — I25.5 ISCHEMIC CARDIOMYOPATHY: Primary | ICD-10-CM

## 2023-05-23 DIAGNOSIS — Z89.421 ABSENCE OF TOE OF RIGHT FOOT (HCC): ICD-10-CM

## 2023-05-23 DIAGNOSIS — I35.0 NONRHEUMATIC AORTIC VALVE STENOSIS: ICD-10-CM

## 2023-05-23 DIAGNOSIS — Z99.2 TYPE 2 DIABETES MELLITUS WITH CHRONIC KIDNEY DISEASE ON CHRONIC DIALYSIS, WITHOUT LONG-TERM CURRENT USE OF INSULIN (HCC): ICD-10-CM

## 2023-05-23 DIAGNOSIS — E78.2 MIXED HYPERLIPIDEMIA: ICD-10-CM

## 2023-05-23 DIAGNOSIS — M20.42 HAMMER TOES OF BOTH FEET: ICD-10-CM

## 2023-05-23 DIAGNOSIS — Z76.82 PRE-KIDNEY TRANSPLANT, LISTED: ICD-10-CM

## 2023-05-23 DIAGNOSIS — Z99.2 ESRD ON DIALYSIS (HCC): ICD-10-CM

## 2023-05-23 DIAGNOSIS — E11.42 DIABETIC POLYNEUROPATHY ASSOCIATED WITH TYPE 2 DIABETES MELLITUS (HCC): ICD-10-CM

## 2023-05-23 DIAGNOSIS — R55 PRE-SYNCOPE: ICD-10-CM

## 2023-05-23 DIAGNOSIS — N18.6 TYPE 2 DIABETES MELLITUS WITH CHRONIC KIDNEY DISEASE ON CHRONIC DIALYSIS, WITHOUT LONG-TERM CURRENT USE OF INSULIN (HCC): ICD-10-CM

## 2023-05-23 DIAGNOSIS — I10 PRIMARY HYPERTENSION: ICD-10-CM

## 2023-05-23 DIAGNOSIS — I21.4 TYPE 1 NON-ST ELEVATION MYOCARDIAL INFARCTION (NSTEMI) (HCC): ICD-10-CM

## 2023-05-23 DIAGNOSIS — E11.22 TYPE 2 DIABETES MELLITUS WITH CHRONIC KIDNEY DISEASE ON CHRONIC DIALYSIS, WITHOUT LONG-TERM CURRENT USE OF INSULIN (HCC): ICD-10-CM

## 2023-05-23 NOTE — TELEPHONE ENCOUNTER
Dr Alicia Duarte is requesting a heart failure consult for this patient asap  Please call him and schedule him with Dr Rena Ludwig in [place] on Thursday May 25  Patient has dialysis on Monday, Wednesday, and Fridays and cannot schedule additional appointments       Thank you,    Vasquez Uriostegui

## 2023-05-23 NOTE — LETTER
May 23, 2023     [facility]  [address] [facility] [id number]    Patient: Angelia Mclain   YOB: 1960   Date of Visit: 5/23/2023       Dear Dr Leonardo Locke: Thank you for referring Rishi Malik to me for evaluation  Below are my notes for this consultation  If you have questions, please do not hesitate to call me  I look forward to following your patient along with you  Sincerely,        Judy Lawrence MD        CC: Radha Oliva, MD Heather Treadwell DPM Susanne Blinks, MD Sabina Agreste, MD  5/23/2023  1:21 PM  Sign when Signing Visit   Consultation - Electrophysiology-Cardiology (EP)   Angelia Mclain 61 y o  male MRN: [id number]  Unit/Bed#:  Encounter: [id number]      2  Ischemic cardiomyopathy  POCT ECG    Ambulatory Referral to Infectious Disease    Ambulatory Referral to Podiatry    Ambulatory Referral to Cardiology      2  Penetrating foot wound, left, initial encounter  Ambulatory Referral to Infectious Disease    Ambulatory Referral to Podiatry    Ambulatory Referral to Cardiology      3  Diabetic polyneuropathy associated with type 2 diabetes mellitus [facility])  Ambulatory Referral to Infectious Disease    Ambulatory Referral to Podiatry    Ambulatory Referral to Cardiology      4  Type 2 diabetes mellitus with chronic kidney disease on chronic dialysis, without long-term current use of insulin [facility])  Ambulatory Referral to Infectious Disease    Ambulatory Referral to Podiatry    Ambulatory Referral to Cardiology      5  Hammer toes of both feet  Ambulatory Referral to Infectious Disease    Ambulatory Referral to Podiatry    Ambulatory Referral to Cardiology      6  Ulcer of left heel, limited to breakdown of skin [facility])  Ambulatory Referral to Infectious Disease    Ambulatory Referral to Podiatry    Ambulatory Referral to Cardiology      7   Absence of toe of right foot [facility])  Ambulatory Referral to Infectious Disease Ambulatory Referral to Podiatry    Ambulatory Referral to Cardiology      8  Di[facility]        9  Primary hypertension        10  CAD, multiple vessel        11  Pre-syncope        12  Type 1 non-ST elevation myocardial infarction (NSTEMI) s/p ADE to in-stent restenosis of mid LAD        13  Nonrheumatic aortic valve stenosis        14  ESRD on dialysis ([facility] [address] )        15  Mixed hyperlipidemia        16  History of stroke        16  S/P arteriovenous (AV) fistula creation        18  Pre-kidney transplant, listed        23   Presence of external cardiac defibrillator              Consults  Physician Requesting Consult: Nba Bennett MD   Reason for Consult / Principal Problem: Ischemic cardiomyopathy      Summary of my recommendation for the patient  The patient will be maximally benefited with a BiV ICD + CHF medications at this time   LVEF 35%, QRS RBBB 156 ms  It will be a right-sided device as he has hemodialysis fistula on the left side    There is going to be a very high chance of infection -at the current time from hemodialysis, subsequently if he gets renal transplant from immunosuppression  Patient does have osteomyelitis existing at this time    Patient is going to follow-up with primary cardiologist    Advised heart failure evaluation -trial of low-dose Myles Sleight /any role of SGLT2 inhibitor in the patient on hemodialysis  If EF improves beyond 35% with any additional medication, the patient can avoid high risk defibrillator placement    Clearance from podiatry Dr Simi Cabral before implantation of any device -that osteomyelitis has cleared  Clearance from infectious disease if so recommended by podiatry    Recommend aggressive heart failure therapy + complete clearance of infection + persistence of low EF -before proceeding with BiV ICD            Clinical conditions  CAD, multiple PCI-stent for in-stent restenosis of LAD  Ischemic cardiomyopathy, LVEF 35%  PVC  Diabetes mellitus-2, long-term use of insulin  CVA, thrombosis of left MCA 2018  Chronic kidney disease, ESRD on dialysis  Mild to moderate aortic stenosis  GERD  Mixed hyperlipidemia  Hypertension  Viral hepatitis  Diabetic polyneuropathy  Obesity with BMI 31  Osteomyelitis              Assessment/Plan     CAD, multiple PCI-stent for in-stent restenosis of LAD  Ischemic cardiomyopathy, LVEF 35%  PVC    This is a very complex situation    The patient will be maximally benefited with a BiV ICD + CHF medications at this time   LVEF 35%, QRS RBBB 156 ms  It will be a right-sided device as he has hemodialysis fistula on the left side    There is going to be a very high chance of infection -at the current time from hemodialysis, subsequently if he gets renal transplant from immunosuppression  Patient does have osteomyelitis existing at this time    Patient is going to follow-up with primary cardiologist    Advised heart failure evaluation -trial of low-dose Napoleon Mily /any role of SGLT2 inhibitor in the patient on hemodialysis  If EF improves beyond 35% with any additional medication, the patient can avoid high risk defibrillator placement    Clearance from podiatry Dr Marlo Chapin before implantation of any device -that osteomyelitis has cleared  Clearance from infectious disease if so recommended by podiatry    Recommend aggressive heart failure therapy + complete clearance of infection + persistence of low EF -before proceeding with BiV ICD            Diabetes mellitus-2, long-term use of insulin  CAD and ischemic cardiomyopathy  CVA, thrombosis of left MCA 2018  Chronic kidney disease, ESRD on dialysis  Diabetic polyneuropathy  Osteomyelitis  Reticulocyte macular edema  Multiple complications from diabetes-macrovascular and microvascular  On insulin  Follow-up with endocrine        Mild to moderate aortic stenosis  Latest echocardiogram reviewed   follow-up with primary cardiology      Mixed hyperlipidemia  On atorvastatin  No myositis or myalgia      Hypertension  On metoprolol succinate 50 mg 2 times daily  May need titration to allow use of Entresto  Referral to heart failure services      Obesity with BMI 31  Continue nutritional management      Osteomyelitis  As per podiatry  Also have evaluation by infectious disease if podiatry recommends        History of Present Illness   HPI: Lupe Apple is a 61y o  year old male has been referred to me by Dr Rafaela Eaton for the evaluation and management of ischemic cardiomyopathy      The patient has significant medical illnesses which include  CAD, multiple PCI-stent for in-stent restenosis of LAD  Ischemic cardiomyopathy, LVEF 35%  PVC  Diabetes mellitus-2, long-term use of insulin  CVA, thrombosis of left MCA 2018  Chronic kidney disease, ESRD on dialysis  Mild to moderate aortic stenosis  GERD  Mixed hyperlipidemia  Hypertension  Viral hepatitis  Diabetic polyneuropathy  Obesity with BMI 31  Osteomyelitis      The patient has significant coronary artery disease  He has a LifeVest in place  He is on therapy for heart failure  He is not complaining of active ongoing angina, orthopnea or PND  He does have intermittent leg swelling  He is not complaining of palpitations, presyncope or syncope at this time  He does have exertional intolerance    He has a longstanding history of diabetes in both micro and macrovascular complication  Patient has a history of stroke    He is also has end-stage renal disease and is on hemodialysis from left arm AV fistula  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)     M-W-F   • GERD (gastroesophageal reflux disease)    • Hypercholesteremia    • Hyperlipidemia    • Hypertension    • 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   • Neuropathy    • Obesity    • Osteomyelitis ([address] )     last assessed 11/4/16   • PVC's (premature ventricular contractions)     sees cardiology Dr Rica camargo   • Stroke [facility])     last weeof July 2018 [facility]   • TIA (transient ischemic attack) 10/28/2018     Past Surgical History:   Procedure Laterality Date   • ABDOMINAL SURGERY     • CARDIAC CATHETERIZATION N/A 5/2/2022    Procedure: Cardiac Coronary Angiogram;  Surgeon: Juliette Cabot, MD;  [facility] C[facility]; Service: Cardiology   • CARDIAC CATHETERIZATION N/A 5/2/2022    Procedure: Cardiac pci;  Surgeon: Juliette Cabot, MD;  [facility] CARD[facility]; Service: Cardiology   • CARDIAC CATHETERIZATION  2/1/2023    Procedure: Cardiac catheterization;  Surgeon: Juliette Cabot, MD;  [facility] CARD[facility]; Service: Cardiology   • CARDIAC CATHETERIZATION N/A 2/1/2023    Procedure: Cardiac pci;  Surgeon: Juliette Cabot, MD;  [facility]: [facility] CARDIAC [facility]; Service: Cardiology   • CARDIAC CATHETERIZATION N/A 2/1/2023    Procedure: Cardiac Coronary Angiogram;  Surgeon: Juliette Cabot, MD;  [facility]: [facility] CARD[facility]; Service: Cardiology   • CARDIAC CATHETERIZATION N/A 2/1/2023    Procedure: Cardiac other-IVUS;  Surgeon: Juliette Cabot, MD;  Location: [facility] CARDIAC [facility]; Service: Cardiology   • CHOLECYSTECTOMY      Percutaneous   • COLONOSCOPY     • CYSTOSCOPY     • OTHER SURGICAL HISTORY      \"stimulator to control bowel movements\"   • TX ESOPHAGOGASTRODUODENOSCOPY TRANSORAL DIAGNOSTIC N/A 9/27/2016    Procedure: ESOPHAGOGASTRODUODENOSCOPY (EGD); Surgeon: Yaneth Oates MD;  Location: [facility] GI LAB;   Service: Gastroenterology   • TX LAPAROSCOPY SURG CHOLECYSTECTOMY N/A 2/29/2016    Procedure: LAPAROSCOPIC CHOLECYSTECTOMY ;  Surgeon: Felicitas Beaver DO;  Location: " [facility] Main OR;  Service: General   • ROTATOR CUFF REPAIR Right    • TOE AMPUTATION Right 10/28/2016    Procedure: 3RD TOE AMPUTATION ;  Surgeon: Rah Abernathy DPM;  Location: [facility] OR;  Service:      Social History     Substance and Sexual Activity   Alcohol Use Not Currently     Social History     Substance and Sexual Activity   Drug Use No     Social History     Tobacco Use   Smoking Status Never   Smokeless Tobacco Never     Social History     Socioeconomic History   • Marital status: /Civil Union     Spouse name: Not on file   • Number of children: Not on file   • Years of education: Not on file   • Highest education level: Not asked   Occupational History   • Occupation: disabled   Tobacco Use   • Smoking status: Never   • Smokeless tobacco: Never   Vaping Use   • Vaping Use: Never used   Substance and Sexual Activity   • Alcohol use: Not Currently   • Drug use: No   • Sexual activity: Not Currently   Other Topics Concern   • Not on file   Social History Narrative    Daily caffeine consumption 2-3 servings a day     Social Determinants of Health     Financial Resource Strain: Not on file   Food Insecurity: No Food Insecurity   • Worried About Running Out of Food in the Last Year: Never true   • Ran Out of Food in the Last Year: Never true   Transportation Needs: No Transportation Needs   • Lack of Transportation (Medical): No   • Lack of Transportation (Non-Medical):  No   Physical Activity: Not on file   Stress: Not on file   Social Connections: Not on file   Intimate Partner Violence: Not on file   Housing Stability: Low Risk    • Unable to Pay for Housing in the Last Year: No   • Number of Places Lived in the Last Year: 1   • Unstable Housing in the Last Year: No        Family History:  Family History   Problem Relation Age of Onset   • Leukemia Mother    • Liver disease Mother    • Lung cancer Mother         heavy smoker - 3 ppd   • Heart disease Father    • Liver disease Father    • Diabetes "type I Father    • Multiple myeloma Sister    • Breast cancer Sister    • Urolithiasis Family    • Alcohol abuse Neg Hx    • Depression Neg Hx    • Drug abuse Neg Hx    • Substance Abuse Neg Hx    • Mental illness Neg Hx          Meds/Allergies       No current facility-administered medications for this visit  (Not in a hospital admission)      No Known Allergies        Objective    Vitals:   Visit Vitals  Pulse 63   Ht 5' 9\" (1 753 m)   BMI 31 31 kg/m²   Smoking Status Never   BSA 2 12 m²      There were no vitals filed for this visit  [unfilled]    Invasive Devices     Line  Duration           Hemodialysis AV Fistula Left Upper arm -- days                  ROS  Review of Systems   All other systems reviewed and are negative  As discussed in detail in my history of present illness      PHYSICAL EXAM  Physical Exam  Vitals reviewed  Constitutional:       General: He is not in acute distress  Appearance: Normal appearance  He is obese  He is ill-appearing  HENT:      Head: Normocephalic and atraumatic  Right Ear: External ear normal       Left Ear: External ear normal       Nose: Nose normal       Mouth/Throat:      Comments: Posterior pharynx is crowded  Eyes:      General: No scleral icterus  Comments: Patient does have normal conjunctiva  There is loss of vision from macular edema   Cardiovascular:      Rate and Rhythm: Normal rate and regular rhythm  Heart sounds: Murmur heard  Pulmonary:      Effort: No respiratory distress  Breath sounds: Normal breath sounds  Abdominal:      Palpations: Abdomen is soft  Musculoskeletal:      Cervical back: Neck supple  No rigidity  Comments: Left forearm AV fistula   Skin:     Coloration: Skin is not jaundiced  Findings: No bruising  Neurological:      Mental Status: He is oriented to person, place, and time  Mental status is at baseline     Psychiatric:         Mood and Affect: Mood normal          Behavior: Behavior normal    " Thought Content: Thought content normal          Judgment: Judgment normal                LAB RESULTS:      CBC:  Results from Last 12 Months   Lab Units 03/27/23  1830 03/10/23  0515 03/09/23  0541 03/08/23  1455 03/06/23 2028 02/11/23  0238 02/02/23  0615 02/01/23  0729   WBC Thousand/uL 6 14 5 84 5 30 4 58 8 08 7 21   < > 8 27   HEMOGLOBIN g/dL 9 7* 10 3* 10 2* 10 8* 10 6* 10 5*   < > 10 7*   HEMATOCRIT % 31 2* 32 2* 32 8* 34 9* 33 0* 33 1*   < > 33 7*   MCV fL 98 94 96 95 95 95   < > 95   PLATELETS Thousands/uL 159 159 142* 138* 138* 221   < > 180   MCH pg 30 4 30 0 29 7 29 5 30 5 30 0   < > 30 1   MCHC g/dL 31 1* 32 0 31 1* 30 9* 32 1 31 7   < > 31 8   RDW % 17 2* 14 7 14 8 15 1 15 3* 14 7   < > 15 3*   MPV fL 10 6 10 1 10 0 10 2 9 7 10 5   < > 10 3   NRBC AUTO /100 WBCs 0  --   --  0 0 0  --  0    < > = values in this interval not displayed  CMP:  Results from Last 12 Months   Lab Units 03/27/23  1830 03/10/23  0515 03/09/23  0541 03/08/23  1455 03/06/23 2028 02/11/23  0238 02/01/23  0729 01/31/23  0521 01/30/23  1505 01/30/23  0458   POTASSIUM mmol/L 3 9 4 7 3 9 4 3 4 2 3 9   < > 4 3  --  5 2   CHLORIDE mmol/L 95* 100 99 97 94* 94*   < > 93*  --  98   CO2 mmol/L 33* 21 27 31 32 34*   < > 28  --  25   CO2, I-STAT mmol/L  --   --   --   --   --   --   --   --  25  --    BUN mg/dL 14 62* 48* 39* 28* 29*   < > 52*  --  68*   CREATININE mg/dL 3 94* 8 37* 6 86* 5 21* 4 72* 5 49*   < > 7 29*  --  9 62*   GLUCOSE, ISTAT mg/dl  --   --   --   --   --   --   --   --  142*  --    CALCIUM mg/dL 9 2 9 2 9 4 9 6 9 7 9 7   < > 8 9  --  8 8   AST U/L  --   --  10  --  8* 10  --  21  --  27   ALT U/L  --   --  12  --  6* 18  --  13  --  13   ALK PHOS U/L  --   --  62  --  57 97  --  56  --  60   EGFR ml/min/1 73sq m 15 6 7 10 12 10   < > 7  --  5    < > = values in this interval not displayed          Magnesium:   Results from Last 12 Months   Lab Units 03/09/23  0541 02/02/23  0637 02/01/23  9277 01/31/23  9903 01/29/23  0821   MAGNESIUM mg/dL 2 6 3 6* 3 2* 3 0* 1 5*       A1C:  Results from Last 12 Months   Lab Units 04/18/23  1537 12/23/22  0744 07/25/22  0710 06/20/22  0829   HEMOGLOBIN A1C  4 9 5 5 6 0* 5 5        TSH:  No results for input(s): TSH in the last 8784 hours  TSH 3rd Gen:  Results from Last 12 Months   Lab Units 01/29/23  0821 01/25/23  1958   TSH 3RD GENERATON uIU/mL 1 966 2 455        PT/INR:  Results from Last 12 Months   Lab Units 01/29/23  0821 10/20/22  2351 07/25/22  0710 06/20/22  0829 06/10/22  1236   PROTIME seconds 13 7 13 5 13 5 12 8 13 3   INR  1 03 1 01 1 01 1 00 1 01       Lipid Panel:  Results from Last 12 Months   Lab Units 01/30/23  0458 07/25/22  0710 06/20/22  0829 06/02/22  0723   CHOLESTEROL mg/dL 76 82 92 80   TRIGLYCERIDES mg/dL 123 151* 182* 133   HDL mg/dL 30* 33* 31* 27*   NON-HDL-CHOL (CHOL-HDL) mg/dl 46 49 61 53      CRP  3/27/2023    Component Ref Range & Units 3/27/23  6:30 PM 3/6/23  8:28 PM 1/29/23 10:52 PM 1/2/21  6:12 AM 1/1/21  6:05 AM 12/31/20  6:36 AM 12/30/20  9:09 PM   CRP <3 0 mg/L 16 4 High   156 1 High   123 2 High   75 8 High   149  1 High   78 6 High   62 7 High           Troponin:  No results for input(s): TROPONINI in the last 8784 hours  Imaging:     Imaging:    EDD  No results found for this or any previous visit  Echo follow-up limited  4/24/2023    •  Left Ventricle: Left ventricular cavity size is mildly dilated  Wall thickness is mildly increased  There is mild concentric hypertrophy  The left ventricular ejection fraction is 35%  Systolic function is moderately reduced  There is moderate global hypokinesis with regional variation  Diastolic function is severely abnormal, consistent with ungraded restrictive relaxation  •  Right Ventricle: Right ventricular cavity size is mildly dilated  •  Left Atrium: The atrium is moderately dilated  •  Aortic Valve: The aortic valve is trileaflet  The leaflets are moderately thickened   The leaflets are moderately calcified  There is moderately reduced mobility  There is mild regurgitation  There is moderate stenosis  The aortic valve peak velocity is 2 31 m/s  The aortic valve mean gradient is 11 mmHg  The dimensionless velocity index is 0 35  The aortic valve area is 1 81 cm2  The stroke volume index is 45 50 ml/m2  •  Mitral Valve: There is moderate annular calcification  There is moderate regurgitation  •  Pulmonic Valve: There is mild regurgitation  •  Tricuspid Valve: There is mild regurgitation  The right ventricular systolic pressure is mildly elevated  The estimated right ventricular systolic pressure is 24 06 mmHg      Findings    Left Ventricle Left ventricular cavity size is mildly dilated  Wall thickness is mildly increased  There is mild concentric hypertrophy  The left ventricular ejection fraction is 35%  Systolic function is moderately reduced  There is moderate global hypokinesis with regional variation  Diastolic function is severely abnormal, consistent with ungraded restrictive relaxation  Right Ventricle Right ventricular cavity size is mildly dilated  Systolic function is normal    Left Atrium The atrium is moderately dilated  Right Atrium The atrium is normal in size  Aortic Valve The aortic valve is trileaflet  The leaflets are moderately thickened  The leaflets are moderately calcified  There is moderately reduced mobility  There is mild regurgitation  There is moderate stenosis  The aortic valve peak velocity is 2 31 m/s  The aortic valve mean gradient is 11 mmHg  The dimensionless velocity index is 0 35  The aortic valve area is 1 81 cm2  The stroke volume index is 45 50 ml/m2  Mitral Valve There is moderate annular calcification  There is moderate regurgitation  There is no evidence of stenosis  Tricuspid Valve Tricuspid valve structure is normal  There is mild regurgitation  There is no evidence of stenosis  The right ventricular systolic pressure is mildly elevated   The estimated right ventricular systolic pressure is 99 12 mmHg  Pulmonic Valve The pulmonic valve was not well visualized  There is mild regurgitation  There is no evidence of stenosis  Ascending Aorta The aortic root is normal in size  IVC/SVC The right atrial pressure is estimated at 3 0 mmHg  The inferior vena cava is normal in size  Respirophasic changes were normal    Pericardium There is no pericardial effusion  The pericardium is normal in appearance  Pulmonary Veins There is systolic blunting in the pulmonary veins  Echocardiogram  Results for orders placed during the hospital encounter of 01/29/23    Echo complete w/ contrast if indicated    Interpretation Summary  •  Left Ventricle: Left ventricular cavity size is normal  Wall thickness is mildly increased  There is mild concentric hypertrophy  The left ventricular ejection fraction is 25-30%  Systolic function is severely reduced  There is mild global hypokinesis  •  Right Ventricle: Right ventricular cavity size is normal  Systolic function is normal   •  Aortic Valve: The aortic valve is trileaflet  The leaflets are moderately thickened  The leaflets are moderately calcified  There is moderately reduced mobility  There is mild regurgitation  There is moderate stenosis  The aortic valve peak velocity is 1 87 m/s  The aortic valve peak gradient is 14 mmHg  The aortic valve mean gradient is 9 mmHg  The dimensionless velocity index is 0 36  The aortic valve area is 1 97 cm2  •  Mitral Valve: There is severe annular calcification  There is moderate regurgitation  •  The following segments are akinetic: basal inferior and mid inferior  •  The following segments are hypokinetic: basal anterior, basal anteroseptal, basal inferoseptal, basal inferolateral, basal anterolateral, mid anterior, mid anteroseptal, mid inferoseptal, mid inferolateral, mid anterolateral, apical anterior, apical septal, apical inferior, apical lateral and apex    • Tricuspid Valve: The right ventricular systolic pressure is moderately elevated  The estimated right ventricular systolic pressure is 66 33 mmHg  Stress Test:   Results for orders placed during the hospital encounter of 04/14/22    NM myocardial perfusion spect (rx stress and/or rest)    Interpretation Summary  •  Stress ECG: A pharmacological stress test was performed using regadenoson  The patient experienced no angina during the test   •  Stress ECG: The stress ECG is negative for ischemia after pharmacologic stress  •  Perfusion: There is a left ventricular perfusion defect that is medium in size with severe reduction in uptake present in the basal to mid inferior and inferolateral location(s) consistent with prior scar  •  Stress Function: Left ventricular function post-stress is normal  Post-stress ejection fraction is 46 %  There is a defect in the basal to mid inferior and inferolateral location(s)  •  Stress Combined Conclusion: Findings are consistent with inferolateral infarction  No results found for this or any previous visit  Cardiac catheterization   2/1/2023    Coronary artery disease involving native coronary artery of native heart without angina pectoris [I25 10 (ICD-10-CM)]   Elevated troponin [R77 8 (ICD-10-CM)]     Impression       •  1st Mrg lesion is 100% stenosed  •  RPAV lesion is 100% stenosed  •  Mid LAD lesion is 80% stenosed  •  RPDA lesion is 60% stenosed  •  The angioplasty was pre-stent angioplasty  •  The angioplasty was pre-stent angioplasty      Multivessel CAD with marked progression since the prior angiographic study in May 2022  Stents placed in the mid LAD exhibited significant discrete and-stent restenosis  The first OM branch, stented in 5/22, has become occluded  The distal RCA beyond the PDA has akso become occluded  LAD in--stent restenosis was interrogated by IVUS imaging and successfully treated with placement of a 4 0x13mm Orsiro ADE    An attempt was made to wire the occluded OM branch, but the wire could not be advanced beyond the stent in mid vessel  Plan: DAPT, statin, compliance with medical and hemodialysis therapy, increased activity, weight loss  Discussed in detail with the patient's family  Coronary Findings    Diagnostic  Dominance: Right  Left Anterior Descending   The vessel is large  There is moderate diffuse disease throughout the vessel  Mid LAD lesion is 80% stenosed  Culprit lesion  JALIL flow is 3  The lesion is within stent  Ultrasound (IVUS) was performed on the Mid LAD  Left Circumflex      First Obtuse Marginal Branch   1st Mrg lesion is 100% stenosed  Not the culprit lesion  JALIL flow is 1  The lesion was previously treated using a stent of unknown type  Right Coronary Artery      Right Posterior Descending Artery   RPDA lesion is 60% stenosed  JALIL flow is 3  Right Posterior Atrioventricular Artery   RPAV lesion is 100% stenosed  JALIL flow is 0  Intervention        Mid LAD lesion   Angioplasty   Angioplasty using a compliant balloon was performed prior to stent deployment  The balloon used was a CATH BAL PTCA RX TREK 3 X 20MM  Maximum pressure: 18 alvaro  Inflation time: 90 sec  Time obtained: 17:15 EST  Supplies used: GUIDEWIRE RUNTHROUGH 180CM; CATH GUIDE LAUNCHER 6FR EBU 3 5   Angioplasty   Angioplasty using a noncompliant balloon was performed prior to stent deployment  The balloon used was a CATH BAL TREK NC HARJINDER 3 50 X 15MM RAPD EXCH  Maximum pressure: 12 alvaro  Inflation time: 15 sec  Time obtained: 17:26 EST  First reinflation; Max pressure: 18 alvaro  Inflation time 30 sec  Time obtained: 17:26 EST  Second reinflation; Max pressure: 16 alvaro  Inflation time 16 sec  Time obtained: 17:28 EST  Supplies used: GUIDEWIRE RUNTHROUGH 180CM; CATH GUIDE LAUNCHER 6FR EBU 3 5   Stent   Drug-eluting stent was successfully placed  The stent used was a STENT [employer] ELUTING 4 0 X 13 MM   Stent was deployed by way of balloon expansion  Maximum pressure: 16 alvaro  Inflation time: 30 sec  Supplies used: GUIDEWIRE RUNTHROUGH 180CM; CATH GUIDE LAUNCHER 6FR EBU 3 5   Post-Intervention Lesion Assessment   Post-intervention JALIL flow is 3  There is a 0% residual stenosis post intervention  HOLTER MONITOR: 24 HOUR/48 HOUR MONITORS  No results found for this or any previous visit  AMB extended holter monitor  No results found for this or any previous visit  DEVICE CHECK:       No results found for this or any previous visit          Code Status: [unfilled]  Advance Directive and Living Will:      Power of :    POLST:      Counseling / Coordination of Care  Very detailed discussion done with regards to device, risks and possible complication  Patient is going to follow-up with primary cardiologist, heart failure service, podiatry, infectious disease  Once it is deemed absolutely necessary we will proceed with implant    Shona Rodríguez MD

## 2023-05-25 ENCOUNTER — OFFICE VISIT (OUTPATIENT)
Dept: CARDIOLOGY CLINIC | Facility: CLINIC | Age: 63
End: 2023-05-25

## 2023-05-25 VITALS
SYSTOLIC BLOOD PRESSURE: 122 MMHG | HEART RATE: 62 BPM | OXYGEN SATURATION: 95 % | WEIGHT: 212 LBS | HEIGHT: 69 IN | DIASTOLIC BLOOD PRESSURE: 76 MMHG | BODY MASS INDEX: 31.4 KG/M2

## 2023-05-25 DIAGNOSIS — N18.6 ESRD (END STAGE RENAL DISEASE) (HCC): ICD-10-CM

## 2023-05-25 DIAGNOSIS — I25.5 ISCHEMIC CARDIOMYOPATHY: Primary | ICD-10-CM

## 2023-05-25 DIAGNOSIS — Z95.5 S/P CORONARY ARTERY STENT PLACEMENT: ICD-10-CM

## 2023-05-25 DIAGNOSIS — I25.10 CORONARY ARTERY DISEASE INVOLVING NATIVE CORONARY ARTERY OF NATIVE HEART WITHOUT ANGINA PECTORIS: ICD-10-CM

## 2023-05-25 RX ORDER — SACUBITRIL AND VALSARTAN 24; 26 MG/1; MG/1
1 TABLET, FILM COATED ORAL 2 TIMES DAILY
Qty: 60 TABLET | Refills: 5 | Status: ON HOLD | OUTPATIENT
Start: 2023-05-25

## 2023-05-25 NOTE — PROGRESS NOTES
Advanced Heart Failure Outpatient Consult Note - Leelee Astorga 61 y o  male MRN: [id number]    Encounter: [id number]      Assessment/Plan:    Patient Active Problem List    Diagnosis Date Noted   • Ulcer of left heel, limited to breakdown of skin ([place] [address] ) 04/25/2023   • Elevated troponin 03/17/2023   • Presence of external cardiac defibrillator 03/17/2023   • Diabetic polyneuropathy associated with type 2 diabetes mellitus ([place] [address] ) 03/07/2023   • Absence of toe of right foot ([place] [address] ) 03/07/2023   • Hammer toes of both feet 03/07/2023   • Type 1 non-ST elevation myocardial infarction (NSTEMI) s/p ADE to in-stent restenosis of mid LAD 02/02/2023   • Ischemic cardiomyopathy 02/02/2023   • Moderate AS (aortic stenosis) 02/02/2023   • Mood disorder ([place] [address] ) 02/02/2023   • Pre-syncope 01/06/2023   • SOB (shortness of breath) 10/21/2022   • Left arm swelling 10/21/2022   • CAD, multiple vessel 07/14/2022   • Electrolyte abnormality 05/02/2022   • Chest pain 05/01/2022   • Generalized weakness 04/19/2022   • Primary hypertension 04/06/2022   • Penetrating foot wound, left, initial encounter 01/19/2022   • Emotional lability 01/19/2022   • History of 2019 novel coronavirus disease (COVID-19) 12/26/2020   • ESRD on dialysis ([place] [address] ) 12/26/2020   • Anemia 10/05/2020   • S/P arteriovenous (AV) fistula creation 04/27/2020   • Pre-kidney transplant, listed 04/27/2020   • Urge incontinence 12/20/2019   • Overactive bladder 12/20/2019   • Anxiety associated with depression 02/28/2019   • History of stroke 10/04/2018   • Abnormal EEG 09/24/2018   • Other constipation 08/17/2018   • GERD (gastroesophageal reflux disease) 08/13/2018   • Diabetic macular edema ([place] [address] ) 08/09/2018   • Elevated alkaline phosphatase level 08/03/2018   • Nephrotic range proteinuria 03/28/2018   • Type 2 diabetes mellitus with chronic kidney disease on chronic dialysis, without long-term current use of insulin [facility] ) 02/26/2016   • Dizziness 02/26/2016   • Mixed hyperlipidemia 02/26/2016       # Ischemic cardiomyopathy, Stage B/C, NYHA II  Euvolemic, warm on exam    Weight: 212 lbs 5/25/23  NT proBNP: 17, 967 2/11/23    Studies- personally reviewed by me    Echocardiogram 4/24/23  LVEF: 35%, 35-40% on my review, hypokinesis of the mid to apical lateral, anterior and apical walls  LVIDd: 6 1cm, normal wall thickness  RV: mildly dilated, normal systolic function  AV: moderate AS - peak damon 2 3m/sec, mean gradient 11mmHg, DVI 0 35, FATUMA 1 81, SVI 45 5  MR: moderate  PASP: 43mmHg, mild TR, estimated RAP 3, peak TR velocity 3 2m/sec  RVOT: no notching  Other: restrictive diastology, mod LAE, mild NILESH    Echo 2/28/23 ([facility])  LVEF 35 to 40%, hypokinesis of the basal inferior wall, mid to apical lateral and inferolateral walls and apex  LVIDD 5 6 cm  Mild aortic valve stenosis- peak velocity 2 2 m/s, mean gradient 12 mmHg, aortic valve area 1 9 cm², DVI 0 31  Mild MR  RV normal size and systolic function  Trace TR  PASP cannot be determined    [facility] 2/1/23:  •  1st Mrg lesion is 100% stenosed  •  RPAV lesion is 100% stenosed  •  Mid LAD lesion is 80% stenosed  •  RPDA lesion is 60% stenosed  •  The angioplasty was pre-stent angioplasty  •  The angioplasty was pre-stent angioplasty  Multivessel CAD with marked progression since the prior angiographic study in May 2022  Stents placed in the mid LAD exhibited significant discrete in-stent restenosis  The first OM branch, stented in 5/22, has become occluded  The distal RCA beyond the PDA has akso become occluded  LAD in--stent restenosis was interrogated by IVUS imaging and successfully treated with placement of a 4 0x13mm Orsiro ADE  An attempt was made to wire the occluded OM branch, but the wire could not be advanced beyond the stent in mid vessel  Echo 1/30/23:  LVEF 25-30%  LVIDD 5 5 cm  Regional wall motion abnormalities    Normal RV size and systolic function  Moderate AS -peak gradient 9 mmHg, DVI 0 36, aortic valve area 1 97 cm²  Moderate MR  Estimated PA systolic pressure 50 mmHg  Estimated RAP 8 mmHg    Echo 4/2022: LVEF 65%  Moderate to severe concentric hypertrophy  Normal RV systolic function, mildly dilated  Moderate AS  Echo 10/2020: LVEF 60%    Diet:  2 g sodium diet  2000 mL fluid restriction    Neurohormonal Blockade:  --Beta-Blocker: Metoprolol succinate 50 mg twice daily  --ACEi, ARB or ARNi:  --Aldosterone Receptor Blocker:  --SGLT2 Inhibitor:  --Diuretic:    Sudden Cardiac Death Risk Reduction:  --ICD: Wearing Gonzalez, Seen by Dr José Manuel Seth for ICD evaluation  Deemed that he is going to be at high risk of infection due to hemodialysis and possible immunosuppression when he gets renal transplant  Current osteomyelitis also a concern  Electrical Resynchronization:  --Candidacy for BiV device: RBBB, QRSd 150s    Advanced Therapies (if appropriate):   --We will continue to monitor; improvement in EF post PCI of the LAD 2/2023    # CAD s/p stent in the LAD for in-stent stenosis 2/1/23, occluded OM stent (placed 5/2022, attempted to intervene - unable to wire), occluded RPAV   Rx: aspirin, prasugrel (on this agent as outlined by Dr Nelson De La Fuente on office visit note), statin  # PVC  # DM type 2, HbA1c 4 9 4/18/23  # CVA, thrombosis of left MCA 2018  # ESRD on dialysis  # Moderate aortic stenosis  # Mixed hyperlipidemia  # Hypertension  # Viral hepatitis  # Diabetic polyneuropathy  # Obesity with BMI 31  # Osteomyelitis    Today's Plan:  Start entresto 24-26mg two times a day, can hold morning dose on dialysis days; no data on benefit of SGLT2i for cardiomyopathy in ESRD  BMP in 1 week  Obtain echocardiogram in 2 months to reassess EF on GDMT and determine need for primary prevention ICD  2g sodium diet  2L fluid restriction  Daily weights      HPI:   61year old male with PMH of CAD s/p stents to the LAD and OM as above, ischemic cardiomyopathy, ESRD on HD, DM type 2, moderate aortic valve stenosis and osteomyelitis referred by Dr Keke Butler for heart failure management  He was admitted January 29, 2023 due to sudden onset of shortness of breath also with chest pressure/pain  He was in acute respiratory failure requiring BiPAP in the ED and is eventually transferred to the ICU  He was in pulmonary edema  He underwent volume removal with renal replacement therapy  He was also found to have elevated troponins and was started on heparin drip  Echocardiogram showed reduction in LVEF to 25 to 30%  Last known EF prior was normal   He was transferred to [facility] to undergo cardiac catheterization and was found to have progression of coronary artery disease compared to cardiac catheterization in May 2022  He had in-stent stenosis of the LAD status post PCI/stent  He had occluded OM stent which could not be wired thus unable to revascularize  Also with occlusion of the RPAV branch  Patient placed on medical therapy and discharged on a LifeVest   He was unable to tolerate Entresto in the past due to low blood pressure  Subsequent echocardiogram showed improvement in LVEF with echo in February 2023 at [facility] showing EF of 35 to 40%  Repeat echo on 4/24/2023 showed EF of 35%  Refer to Dr Keke Butler for ICD evaluation  Deemed that he is going to be at high risk of infection due to hemodialysis and possible immunosuppression when he gets renal transplant  Current osteomyelitis also a concern  Patient referred to heart failure service for further optimization of medical therapy and if he would be a candidate for Entresto or SGLT2 inhibitor, with the hope of improving LVEF more than 35% and not need defibrillator placement      Past Medical History:   Diagnosis Date   • Cerebrovascular accident (CVA) due to thrombosis of left middle cerebral artery ([facility] ) 07/29/2018   • Chronic kidney disease    • Diabetes mellitus ([facility] )    • Dialysis patient [facility])     M-W-F   • GERD (gastroesophageal reflux disease)    • Hypercholesteremia    • Hyperlipidemia    • Hypertension    • 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   • Neuropathy    • Obesity    • Osteomyelitis ([address] )     last assessed 11/4/16   • PVC's (premature ventricular contractions)     sees cardiology Dr Gemma camargo   • Stroke [facility])     last weeof July 2018 [address] [facility]   • TIA (transient ischemic attack) 10/28/2018       Review of Systems   Constitutional: Negative for chills, fatigue and fever  HENT: Negative for ear pain and sore throat  Eyes: Negative for pain and visual disturbance  Respiratory: Negative for cough, chest tightness and shortness of breath  Cardiovascular: Negative for chest pain, palpitations and leg swelling  Gastrointestinal: Negative for abdominal distention, abdominal pain, nausea and vomiting  Genitourinary: Negative for dysuria and hematuria  Musculoskeletal: Negative for arthralgias and back pain  Skin: Negative for color change and rash  Neurological: Negative for dizziness, seizures, syncope and light-headedness  All other systems reviewed and are negative  No Known Allergies    Current Outpatient Medications:   •  aspirin (ECOTRIN LOW STRENGTH) 81 mg EC tablet, Take 81 mg by mouth daily Resume on 8/14  , Disp: , Rfl:   •  atorvastatin (LIPITOR) 40 mg tablet, TAKE 1 TABLET BY MOUTH EVERY DAY WITH DINNER, Disp: 90 tablet, Rfl: 1  •  Blood Glucose Monitoring Suppl (True Metrix Meter) w/Device KIT, Use to test blood sugars 3 times daily, Disp: 1 kit, Rfl: 0  •  Blood Pressure Monitoring (BLOOD PRESSURE CUFF) MISC, Use to check blood pressure before taking blood pressure medication and 1 hour after and follow instructions provided in discharge instructions based on the readings  , Disp: 1 each, Rfl: 0  •  collagenase (SANTYL) ointment, Apply topically daily, Disp: 90 g, Rfl: 0  •  D3-50 1 25 MG (11068 UT) capsule, TAKE 1 CAPSULE BY MOUTH ONE TIME PER WEEK, Disp: 12 capsule, Rfl: 2  •  divalproex sodium (DEPAKOTE SPRINKLE) 125 MG capsule, TAKE 2 CAPSULES (250 MG TOTAL) BY MOUTH EVERY 12 (TWELVE) HOURS, Disp: 360 capsule, Rfl: 1  •  glucose blood (True Metrix Blood Glucose Test) test strip, Use 1 each 3 (three) times a day Use as instructed, Disp: 300 strip, Rfl: 1  •  metoprolol succinate (TOPROL-XL) 50 mg 24 hr tablet, Take 1 tablet (50 mg total) by mouth 2 (two) times a day, Disp: 180 tablet, Rfl: 3  •  ONETOUCH DELICA LANCETS 15J MISC, by Does not apply route 3 (three) times a day, Disp: 270 each, Rfl: 1  •  prasugrel (EFFIENT) tablet, Take 1 tablet (5 mg total) by mouth daily, Disp: 90 tablet, Rfl: 3  •  sacubitril-valsartan (Entresto) 24-26 MG TABS, Take 1 tablet by mouth 2 (two) times a day Can hold morning dose on dialysis days  , Disp: 60 tablet, Rfl: 5  •  Sevelamer Carbonate 2 4 g PACK, VIGOROUSLY MIX CONTENTS OF 1 PACKET IN WATER (IT WILL NOT DISSOLVE) AND DRINK 3 TIMES DAILY WITH MEALS, Disp: , Rfl:     Social History     Socioeconomic History   • Marital status: /Civil Union     Spouse name: Not on file   • Number of children: Not on file   • Years of education: Not on file   • Highest education level: Not asked   Occupational History   • Occupation: disabled   Tobacco Use   • Smoking status: Never   • Smokeless tobacco: Never   Vaping Use   • Vaping Use: Never used   Substance and Sexual Activity   • Alcohol use: Not Currently   • Drug use: No   • Sexual activity: Not Currently   Other Topics Concern   • Not on file   Social History Narrative    Daily caffeine consumption 2-3 servings a day     Social Determinants of Health     Financial Resource Strain: Unknown (4/9/2021)    Overall Financial Resource Strain (CARDIA)    • Difficulty of Paying Living Expenses: Patient refused   Food Insecurity: No Food Insecurity (2/1/2023)    Hunger Vital Sign    • Worried About Running Out of Food in the Last Year: Never true    • Ran Out of Food in the Last Year: Never true   Transportation Needs: No "Transportation Needs (2/1/2023)    PRAPARE - Transportation    • Lack of Transportation (Medical): No    • Lack of Transportation (Non-Medical): No   Physical Activity: Not on file   Stress: No Stress Concern Present (1/18/2019)    [address]    • Feeling of Stress : Only a little   Social Connections: Unknown (1/18/2019)    Social Connection and Isolation Panel [NHANES]    • Frequency of Communication with Friends and Family: Not on file    • Frequency of Social Gatherings with Friends and Family: Not on file    • Attends Taoist Services: Not on file    • Active Member of Clubs or Organizations: Not on file    • Attends Club or Organization Meetings: Not on file    • Marital Status:    Intimate Partner Violence: Not At Risk (1/18/2019)    Humiliation, Afraid, Rape, and Kick questionnaire    • Fear of Current or Ex-Partner: No    • Emotionally Abused: No    • Physically Abused: No    • Sexually Abused: No   Housing Stability: Low Risk  (2/1/2023)    Housing Stability Vital Sign    • Unable to Pay for Housing in the Last Year: No    • Number of Places Lived in the Last Year: 1    • Unstable Housing in the Last Year: No       Family History   Problem Relation Age of Onset   • Leukemia Mother    • Liver disease Mother    • Lung cancer Mother         heavy smoker - 3 ppd   • Heart disease Father    • Liver disease Father    • Diabetes type I Father    • Multiple myeloma Sister    • Breast cancer Sister    • Urolithiasis Family    • Alcohol abuse Neg Hx    • Depression Neg Hx    • Drug abuse Neg Hx    • Substance Abuse Neg Hx    • Mental illness Neg Hx        Physical Exam:    Vitals: Blood pressure 122/76, pulse 62, height 5' 9\" (1 753 m), weight 96 2 kg (212 lb), SpO2 95 %  , Body mass index is 31 31 kg/m² ,   Wt Readings from Last 3 Encounters:   05/25/23 96 2 kg (212 lb)   05/16/23 96 2 kg (212 lb)   05/09/23 96 2 kg (212 lb)       Physical " Exam  Constitutional:       General: He is not in acute distress  Appearance: Normal appearance  HENT:      Head: Normocephalic and atraumatic  Mouth/Throat:      Mouth: Mucous membranes are moist    Eyes:      General: No scleral icterus  Extraocular Movements: Extraocular movements intact  Neck:      Vascular: No JVD  Cardiovascular:      Rate and Rhythm: Normal rate and regular rhythm  Pulses: Normal pulses  Heart sounds: S1 normal and S2 normal  Murmur heard  Systolic murmur is present with a grade of 2/6  No friction rub  No gallop  Pulmonary:      Breath sounds: Normal breath sounds  Abdominal:      General: There is no distension  Palpations: Abdomen is soft  Tenderness: There is no abdominal tenderness  There is no guarding or rebound  Musculoskeletal:         General: Normal range of motion  Cervical back: Neck supple  Right lower leg: No edema  Left lower leg: No edema  Comments: LUE AV fistula   Skin:     General: Skin is warm and dry  Capillary Refill: Capillary refill takes less than 2 seconds  Neurological:      General: No focal deficit present  Mental Status: He is alert and oriented to person, place, and time     Psychiatric:         Mood and Affect: Mood normal          Labs & Results:    Lab Results   Component Value Date    HCT 31 2 (L) 03/27/2023    HGB 9 7 (L) 03/27/2023    MCV 98 03/27/2023     03/27/2023    WBC 6 14 03/27/2023     Lab Results   Component Value Date    BUN 14 03/27/2023    CALCIUM 9 2 03/27/2023    CL 95 (L) 03/27/2023    CO2 33 (H) 03/27/2023    CREATININE 3 94 (H) 03/27/2023    GLUC 84 03/27/2023    K 3 9 03/27/2023    SODIUM 136 03/27/2023     Lab Results   Component Value Date    NTBNP 17,967 (H) 02/11/2023      Lab Results   Component Value Date    CHOLESTEROL 76 01/30/2023    CHOLESTEROL 82 07/25/2022    CHOLESTEROL 92 06/20/2022     Lab Results   Component Value Date    HDL 30 (L) 01/30/2023    HDL 33 (L) 07/25/2022    HDL 31 (L) 06/20/2022     Lab Results   Component Value Date    TRIG 123 01/30/2023    TRIG 151 (H) 07/25/2022    TRIG 182 (H) 06/20/2022     Lab Results   Component Value Date    NONHDLC 46 01/30/2023    [place] 49 07/25/2022    [place] 61 06/20/2022       EKG personally reviewed by Lacie Adams MD      Counseling / Coordination of Care  Time spent today 40 minutes  Greater than 50% of total time was spent with the patient and / or family counseling and / or coordination of care  We discussed diagnoses, most recent studies and any changes in treatment    Thank you for the opportunity to participate in the care of this patient      Mel Gil MD  ADVANCED HEART FAILURE AND MECHANICAL CIRCULATORY SUPPORT  [facility]

## 2023-05-30 ENCOUNTER — OFFICE VISIT (OUTPATIENT)
Dept: PODIATRY | Facility: CLINIC | Age: 63
End: 2023-05-30

## 2023-05-30 VITALS
DIASTOLIC BLOOD PRESSURE: 78 MMHG | HEART RATE: 65 BPM | SYSTOLIC BLOOD PRESSURE: 126 MMHG | BODY MASS INDEX: 31.4 KG/M2 | WEIGHT: 212 LBS | HEIGHT: 69 IN

## 2023-05-30 DIAGNOSIS — M20.41 HAMMER TOES OF BOTH FEET: ICD-10-CM

## 2023-05-30 DIAGNOSIS — Z89.421 ABSENCE OF TOE OF RIGHT FOOT (HCC): ICD-10-CM

## 2023-05-30 DIAGNOSIS — M20.42 HAMMER TOES OF BOTH FEET: ICD-10-CM

## 2023-05-30 DIAGNOSIS — L97.421 ULCER OF LEFT HEEL, LIMITED TO BREAKDOWN OF SKIN (HCC): ICD-10-CM

## 2023-05-30 DIAGNOSIS — E11.42 DIABETIC POLYNEUROPATHY ASSOCIATED WITH TYPE 2 DIABETES MELLITUS (HCC): ICD-10-CM

## 2023-05-30 NOTE — PROGRESS NOTES
PATIENT:  Isidro Gould    1960      ASSESSMENT:     1  Ulcer of left heel, limited to breakdown of skin [facility])  Ambulatory Referral to Podiatry      2  Diabetic polyneuropathy associated with type 2 diabetes mellitus ([place] [address] )  Ambulatory Referral to Podiatry      3  Absence of toe of right foot [facility])  Ambulatory Referral to Podiatry      4  Hammer toes of both feet  Ambulatory Referral to Podiatry          PLAN:  1  Reviewed medical records  Patient was counseled on the condition and diagnosis  2  Wound is stable and healing  Will continue Santyl  3  Continue off-loading shoe  Stressed on patient compliance about staying off of his feet  4  Awaits arterial study  5  RA in 4 weeks  I have spent a total time of 20 minutes on 05/30/23 in caring for this patient including Instructions for management, Patient and family education, Risk factor reductions, Counseling / Coordination of care and Documenting in the medical record  Subjective:      HPI:   The patients presents for evaluation of left heel ulcer  Wound looks stable  No redness  Minimal pain  He presents with Globoped shoe today  BS under control  The following portions of the patient's history were reviewed and updated as appropriate: allergies, current medications, past family history, past medical history, past social history, past surgical history and problem list   All pertinent labs and images were reviewed      Past Medical History  Past Medical History:   Diagnosis Date   • Cerebrovascular accident (CVA) due to thrombosis of left middle cerebral artery ([place] [address] ) 07/29/2018   • Chronic kidney disease    • Diabetes mellitus ([place] [address] )    • Dialysis patient [facility])     M-W-F   • GERD (gastroesophageal reflux disease)    • Hypercholesteremia    • Hyperlipidemia    • Hypertension    • 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   • Neuropathy    • Obesity    • Osteomyelitis ([place] [address] )     last assessed 11/4/16   • PVC's (premature "ventricular contractions)     sees cardiology Dr Bobbye Favre singh   • Stroke [facility])     last weeof July 2018 [address] [place] [address]   • TIA (transient ischemic attack) 10/28/2018       Past Surgical History  Past Surgical History:   Procedure Laterality Date   • ABDOMINAL SURGERY     • CARDIAC CATHETERIZATION N/A 5/2/2022    Procedure: Cardiac Coronary Angiogram;  Surgeon: Maritza Canseco MD;  [facility] CARDIAC CATH LAB; Service: Cardiology   • CARDIAC CATHETERIZATION N/A 5/2/2022    Procedure: Cardiac pci;  Surgeon: Maritza Canseco MD;  [facility]: [facility] CARDIAC CATH LAB; Service: Cardiology   • CARDIAC CATHETERIZATION  2/1/2023    Procedure: Cardiac catheterization;  Surgeon: Maritza Canseco MD;  [facility]: [facility] CARDIAC CATH LAB; Service: Cardiology   • CARDIAC CATHETERIZATION N/A 2/1/2023    Procedure: Cardiac pci;  Surgeon: Maritza Canseco MD;  Location: [facility] CARDIAC CATH LAB; Service: Cardiology   • CARDIAC CATHETERIZATION N/A 2/1/2023    Procedure: Cardiac Coronary Angiogram;  Surgeon: Maritza Canseco MD;  [facility]: [facility] CARDIAC CATH [facility]; Service: Cardiology   • CARDIAC CATHETERIZATION N/A 2/1/2023    Procedure: Cardiac other-IVUS;  Surgeon: Maritza Canseco MD;  Location: [facility] CARDIAC CATH LAB; Service: Cardiology   • CHOLECYSTECTOMY      Percutaneous   • COLONOSCOPY     • CYSTOSCOPY     • OTHER SURGICAL HISTORY      \"stimulator to control bowel movements\"   • [place] ESOPHAGOGASTRODUODENOSCOPY TRANSORAL DIAGNOSTIC N/A 9/27/2016    Procedure: ESOPHAGOGASTRODUODENOSCOPY (EGD); Surgeon: Delia Schwab, MD;  Location: [facility] GI LAB;   Service: Gastroenterology   • [place] LAPAROSCOPY SURG CHOLECYSTECTOMY N/A 2/29/2016    Procedure: LAPAROSCOPIC CHOLECYSTECTOMY ;  Surgeon: Keith Nguyễn DO;  Location: [facility] Main OR;  Service: General   • ROTATOR CUFF REPAIR Right    • TOE AMPUTATION Right 10/28/2016    Procedure: 3RD TOE AMPUTATION ;  Surgeon: Jame Lutz DPM;  Location: [facility] Main OR;  Service:         Allergies:  Patient has no known " allergies  Medications:  Current Outpatient Medications   Medication Sig Dispense Refill   • aspirin (ECOTRIN LOW STRENGTH) 81 mg EC tablet Take 81 mg by mouth daily Resume on 8/14       • atorvastatin (LIPITOR) 40 mg tablet TAKE 1 TABLET BY MOUTH EVERY DAY WITH DINNER 90 tablet 1   • Blood Glucose Monitoring Suppl (True Metrix Meter) w/Device KIT Use to test blood sugars 3 times daily 1 kit 0   • Blood Pressure Monitoring (BLOOD PRESSURE CUFF) MISC Use to check blood pressure before taking blood pressure medication and 1 hour after and follow instructions provided in discharge instructions based on the readings  1 each 0   • collagenase (SANTYL) ointment Apply topically daily 90 g 0   • D3-50 1 25 MG (76795 UT) capsule TAKE 1 CAPSULE BY MOUTH ONE TIME PER WEEK 12 capsule 2   • divalproex sodium (DEPAKOTE SPRINKLE) 125 MG capsule TAKE 2 CAPSULES (250 MG TOTAL) BY MOUTH EVERY 12 (TWELVE) HOURS 360 capsule 1   • glucose blood (True Metrix Blood Glucose Test) test strip Use 1 each 3 (three) times a day Use as instructed 300 strip 1   • metoprolol succinate (TOPROL-XL) 50 mg 24 hr tablet Take 1 tablet (50 mg total) by mouth 2 (two) times a day 180 tablet 3   • ONETOUCH DELICA LANCETS 50K MISC by Does not apply route 3 (three) times a day 270 each 1   • prasugrel (EFFIENT) tablet Take 1 tablet (5 mg total) by mouth daily 90 tablet 3   • sacubitril-valsartan (Entresto) 24-26 MG TABS Take 1 tablet by mouth 2 (two) times a day Can hold morning dose on dialysis days  60 tablet 5   • Sevelamer Carbonate 2 4 g PACK VIGOROUSLY MIX CONTENTS OF 1 PACKET IN WATER (IT WILL NOT DISSOLVE) AND DRINK 3 TIMES DAILY WITH MEALS       No current facility-administered medications for this visit         Social History:  Social History     Socioeconomic History   • Marital status: /Civil Union     Spouse name: None   • Number of children: None   • Years of education: None   • Highest education level: Not asked   Occupational History • Occupation: disabled   Tobacco Use   • Smoking status: Never   • Smokeless tobacco: Never   Vaping Use   • Vaping Use: Never used   Substance and Sexual Activity   • Alcohol use: Not Currently   • Drug use: No   • Sexual activity: Not Currently   Other Topics Concern   • None   Social History Narrative    Daily caffeine consumption 2-3 servings a day     Social Determinants of Health     Financial Resource Strain: Unknown (4/9/2021)    Overall Financial Resource Strain (CARDIA)    • Difficulty of Paying Living Expenses: Patient refused   Food Insecurity: No Food Insecurity (2/1/2023)    Hunger Vital Sign    • Worried About Running Out of Food in the Last Year: Never true    • Ran Out of Food in the Last Year: Never true   Transportation Needs: No Transportation Needs (2/1/2023)    PRAPARE - Transportation    • Lack of Transportation (Medical): No    • Lack of Transportation (Non-Medical): No   Physical Activity: Not on file   Stress: No Stress Concern Present (1/18/2019)    [address]    • Feeling of Stress :  Only a little   Social Connections: Unknown (1/18/2019)    Social Connection and Isolation Panel [NHANES]    • Frequency of Communication with Friends and Family: Not on file    • Frequency of Social Gatherings with Friends and Family: Not on file    • Attends Jain Services: Not on file    • Active Member of Clubs or Organizations: Not on file    • Attends Club or Organization Meetings: Not on file    • Marital Status:    Intimate Partner Violence: Not At Risk (1/18/2019)    Humiliation, Afraid, Rape, and Kick questionnaire    • Fear of Current or Ex-Partner: No    • Emotionally Abused: No    • Physically Abused: No    • Sexually Abused: No   Housing Stability: Low Risk  (2/1/2023)    Housing Stability Vital Sign    • Unable to Pay for Housing in the Last Year: No    • Number of Places Lived in the Last Year: 1    • Unstable "Housing in the Last Year: No        Review of Systems   Constitutional: Negative for chills and fever  Respiratory: Negative for cough and shortness of breath  Cardiovascular: Negative for chest pain  Gastrointestinal: Negative for constipation, diarrhea, nausea and vomiting  Neurological: Positive for numbness  Objective:      /78   Pulse 65   Ht 5' 9\" (1 753 m)   Wt 96 2 kg (212 lb)   BMI 31 31 kg/m²          Physical Exam  Vitals reviewed  Constitutional:       General: He is not in acute distress  Appearance: He is well-developed  He is not toxic-appearing or diaphoretic  Cardiovascular:      Rate and Rhythm: Normal rate and regular rhythm  Pulses:           Dorsalis pedis pulses are 1+ on the right side and 1+ on the left side  Posterior tibial pulses are 0 on the right side and 0 on the left side  Pulmonary:      Effort: Pulmonary effort is normal  No respiratory distress  Musculoskeletal:         General: Deformity present  No tenderness  Right foot: No foot drop  Left foot: No foot drop  Comments: Hammertoe deformity noted  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  Feet:      Right foot:      Skin integrity: Dry skin present  No ulcer, skin breakdown, erythema, warmth or callus  Left foot:      Skin integrity: Dry skin present  No ulcer, skin breakdown, erythema, warmth or callus  Skin:     General: Skin is warm  Capillary Refill: Capillary refill takes less than 2 seconds  Coloration: Skin is not cyanotic or mottled  Findings: No ecchymosis  Nails: There is no clubbing  Comments: Ulcer presents left heel  It is 1 0 X 0 4 X 0 2 cm  Increased granular tissues  No cellulitis  Neurological:      General: No focal deficit present  Mental Status: He is alert and oriented to person, place, and time  Cranial Nerves: No cranial nerve deficit  Sensory: Sensory deficit present        Motor: No " weakness  Psychiatric:         Mood and Affect: Mood normal          Behavior: Behavior normal          Thought Content:  Thought content normal          Judgment: Judgment normal

## 2023-06-02 ENCOUNTER — APPOINTMENT (EMERGENCY)
Dept: RADIOLOGY | Facility: HOSPITAL | Age: 63
End: 2023-06-02
Payer: MEDICARE

## 2023-06-02 ENCOUNTER — PATIENT OUTREACH (OUTPATIENT)
Dept: CASE MANAGEMENT | Facility: OTHER | Age: 63
End: 2023-06-02

## 2023-06-02 ENCOUNTER — HOSPITAL ENCOUNTER (INPATIENT)
Facility: HOSPITAL | Age: 63
LOS: 2 days | Discharge: HOME | End: 2023-06-05
Attending: EMERGENCY MEDICINE | Admitting: FAMILY MEDICINE
Payer: MEDICARE

## 2023-06-02 DIAGNOSIS — N18.6 ESRD ON DIALYSIS (HCC): ICD-10-CM

## 2023-06-02 DIAGNOSIS — Z99.2 ESRD ON DIALYSIS (HCC): ICD-10-CM

## 2023-06-02 DIAGNOSIS — I95.9 HYPOTENSION: ICD-10-CM

## 2023-06-02 DIAGNOSIS — I25.10 CAD (CORONARY ARTERY DISEASE): ICD-10-CM

## 2023-06-02 DIAGNOSIS — Z99.2 ESRD (END STAGE RENAL DISEASE) ON DIALYSIS (HCC): ICD-10-CM

## 2023-06-02 DIAGNOSIS — I10 HTN (HYPERTENSION): ICD-10-CM

## 2023-06-02 DIAGNOSIS — R07.9 CHEST PAIN: Primary | ICD-10-CM

## 2023-06-02 DIAGNOSIS — I50.9 CHF (CONGESTIVE HEART FAILURE) (HCC): ICD-10-CM

## 2023-06-02 DIAGNOSIS — E78.5 HLD (HYPERLIPIDEMIA): ICD-10-CM

## 2023-06-02 DIAGNOSIS — N18.6 ESRD (END STAGE RENAL DISEASE) ON DIALYSIS (HCC): ICD-10-CM

## 2023-06-02 DIAGNOSIS — I25.5 ISCHEMIC CARDIOMYOPATHY: ICD-10-CM

## 2023-06-02 LAB
2HR DELTA HS TROPONIN: 112 NG/L
4HR DELTA HS TROPONIN: 93 NG/L
ALBUMIN SERPL BCP-MCNC: 3.3 G/DL (ref 3.5–5)
ALP SERPL-CCNC: 80 U/L (ref 46–116)
ALT SERPL W P-5'-P-CCNC: 13 U/L (ref 12–78)
ANION GAP SERPL CALCULATED.3IONS-SCNC: 2 MMOL/L (ref 4–13)
AST SERPL W P-5'-P-CCNC: 10 U/L (ref 5–45)
ATRIAL RATE: 65 BPM
ATRIAL RATE: 71 BPM
ATRIAL RATE: 72 BPM
BASOPHILS # BLD AUTO: 0.01 THOUSANDS/ÂΜL (ref 0–0.1)
BASOPHILS NFR BLD AUTO: 0 % (ref 0–1)
BILIRUB SERPL-MCNC: 0.67 MG/DL (ref 0.2–1)
BUN SERPL-MCNC: 33 MG/DL (ref 5–25)
CALCIUM ALBUM COR SERPL-MCNC: 9.4 MG/DL (ref 8.3–10.1)
CALCIUM SERPL-MCNC: 8.8 MG/DL (ref 8.3–10.1)
CARDIAC TROPONIN I PNL SERPL HS: 401 NG/L
CARDIAC TROPONIN I PNL SERPL HS: 494 NG/L
CARDIAC TROPONIN I PNL SERPL HS: 513 NG/L
CHLORIDE SERPL-SCNC: 99 MMOL/L (ref 96–108)
CO2 SERPL-SCNC: 32 MMOL/L (ref 21–32)
CREAT SERPL-MCNC: 4.61 MG/DL (ref 0.6–1.3)
EOSINOPHIL # BLD AUTO: 0.06 THOUSAND/ÂΜL (ref 0–0.61)
EOSINOPHIL NFR BLD AUTO: 2 % (ref 0–6)
ERYTHROCYTE [DISTWIDTH] IN BLOOD BY AUTOMATED COUNT: 15.4 % (ref 11.6–15.1)
GFR SERPL CREATININE-BSD FRML MDRD: 12 ML/MIN/1.73SQ M
GLUCOSE SERPL-MCNC: 161 MG/DL (ref 65–140)
GLUCOSE SERPL-MCNC: 172 MG/DL (ref 65–140)
HCT VFR BLD AUTO: 33.2 % (ref 36.5–49.3)
HGB BLD-MCNC: 10.7 G/DL (ref 12–17)
IMM GRANULOCYTES # BLD AUTO: 0.02 THOUSAND/UL (ref 0–0.2)
IMM GRANULOCYTES NFR BLD AUTO: 1 % (ref 0–2)
LYMPHOCYTES # BLD AUTO: 0.94 THOUSANDS/ÂΜL (ref 0.6–4.47)
LYMPHOCYTES NFR BLD AUTO: 24 % (ref 14–44)
MCH RBC QN AUTO: 30.1 PG (ref 26.8–34.3)
MCHC RBC AUTO-ENTMCNC: 32.2 G/DL (ref 31.4–37.4)
MCV RBC AUTO: 94 FL (ref 82–98)
MONOCYTES # BLD AUTO: 0.48 THOUSAND/ÂΜL (ref 0.17–1.22)
MONOCYTES NFR BLD AUTO: 12 % (ref 4–12)
NEUTROPHILS # BLD AUTO: 2.35 THOUSANDS/ÂΜL (ref 1.85–7.62)
NEUTS SEG NFR BLD AUTO: 61 % (ref 43–75)
NRBC BLD AUTO-RTO: 0 /100 WBCS
P AXIS: 108 DEGREES
P AXIS: 55 DEGREES
P AXIS: 75 DEGREES
PLATELET # BLD AUTO: 94 THOUSANDS/UL (ref 149–390)
PLATELET # BLD AUTO: 96 THOUSANDS/UL (ref 149–390)
PMV BLD AUTO: 11.3 FL (ref 8.9–12.7)
PMV BLD AUTO: 11.5 FL (ref 8.9–12.7)
POTASSIUM SERPL-SCNC: 3.6 MMOL/L (ref 3.5–5.3)
PR INTERVAL: 168 MS
PR INTERVAL: 174 MS
PR INTERVAL: 174 MS
PROT SERPL-MCNC: 6.7 G/DL (ref 6.4–8.4)
QRS AXIS: 53 DEGREES
QRS AXIS: 73 DEGREES
QRS AXIS: 95 DEGREES
QRSD INTERVAL: 160 MS
QRSD INTERVAL: 160 MS
QRSD INTERVAL: 162 MS
QT INTERVAL: 478 MS
QT INTERVAL: 478 MS
QT INTERVAL: 480 MS
QTC INTERVAL: 497 MS
QTC INTERVAL: 519 MS
QTC INTERVAL: 525 MS
RBC # BLD AUTO: 3.55 MILLION/UL (ref 3.88–5.62)
SODIUM SERPL-SCNC: 133 MMOL/L (ref 135–147)
T WAVE AXIS: -38 DEGREES
T WAVE AXIS: 213 DEGREES
T WAVE AXIS: 40 DEGREES
VENTRICULAR RATE: 65 BPM
VENTRICULAR RATE: 71 BPM
VENTRICULAR RATE: 72 BPM
WBC # BLD AUTO: 3.86 THOUSAND/UL (ref 4.31–10.16)

## 2023-06-02 PROCEDURE — 84484 ASSAY OF TROPONIN QUANT: CPT

## 2023-06-02 PROCEDURE — 93010 ELECTROCARDIOGRAM REPORT: CPT | Performed by: INTERNAL MEDICINE

## 2023-06-02 PROCEDURE — 80053 COMPREHEN METABOLIC PANEL: CPT

## 2023-06-02 PROCEDURE — 71045 X-RAY EXAM CHEST 1 VIEW: CPT

## 2023-06-02 PROCEDURE — 99285 EMERGENCY DEPT VISIT HI MDM: CPT | Performed by: EMERGENCY MEDICINE

## 2023-06-02 PROCEDURE — 85049 AUTOMATED PLATELET COUNT: CPT | Performed by: FAMILY MEDICINE

## 2023-06-02 PROCEDURE — 82948 REAGENT STRIP/BLOOD GLUCOSE: CPT

## 2023-06-02 PROCEDURE — 93005 ELECTROCARDIOGRAM TRACING: CPT

## 2023-06-02 PROCEDURE — 99285 EMERGENCY DEPT VISIT HI MDM: CPT

## 2023-06-02 PROCEDURE — 85025 COMPLETE CBC W/AUTO DIFF WBC: CPT

## 2023-06-02 PROCEDURE — 99223 1ST HOSP IP/OBS HIGH 75: CPT | Performed by: FAMILY MEDICINE

## 2023-06-02 PROCEDURE — 36415 COLL VENOUS BLD VENIPUNCTURE: CPT

## 2023-06-02 RX ORDER — MELATONIN
1000 DAILY
Status: DISCONTINUED | OUTPATIENT
Start: 2023-06-03 | End: 2023-06-05 | Stop reason: HOSPADM

## 2023-06-02 RX ORDER — DOCUSATE SODIUM 100 MG/1
100 CAPSULE, LIQUID FILLED ORAL 2 TIMES DAILY
Status: DISCONTINUED | OUTPATIENT
Start: 2023-06-02 | End: 2023-06-05 | Stop reason: HOSPADM

## 2023-06-02 RX ORDER — ONDANSETRON 2 MG/ML
4 INJECTION INTRAMUSCULAR; INTRAVENOUS EVERY 6 HOURS PRN
Status: DISCONTINUED | OUTPATIENT
Start: 2023-06-02 | End: 2023-06-05 | Stop reason: HOSPADM

## 2023-06-02 RX ORDER — SENNOSIDES 8.6 MG
1 TABLET ORAL DAILY
Status: DISCONTINUED | OUTPATIENT
Start: 2023-06-03 | End: 2023-06-05 | Stop reason: HOSPADM

## 2023-06-02 RX ORDER — ACETAMINOPHEN 325 MG/1
650 TABLET ORAL EVERY 6 HOURS PRN
Status: DISCONTINUED | OUTPATIENT
Start: 2023-06-02 | End: 2023-06-05 | Stop reason: HOSPADM

## 2023-06-02 RX ORDER — HEPARIN SODIUM 5000 [USP'U]/ML
5000 INJECTION, SOLUTION INTRAVENOUS; SUBCUTANEOUS EVERY 8 HOURS SCHEDULED
Status: DISCONTINUED | OUTPATIENT
Start: 2023-06-02 | End: 2023-06-05 | Stop reason: HOSPADM

## 2023-06-02 RX ORDER — PRASUGREL 10 MG/1
5 TABLET, FILM COATED ORAL DAILY
Status: DISCONTINUED | OUTPATIENT
Start: 2023-06-03 | End: 2023-06-05 | Stop reason: HOSPADM

## 2023-06-02 RX ORDER — INSULIN LISPRO 100 [IU]/ML
1-5 INJECTION, SOLUTION INTRAVENOUS; SUBCUTANEOUS
Status: DISCONTINUED | OUTPATIENT
Start: 2023-06-02 | End: 2023-06-05 | Stop reason: HOSPADM

## 2023-06-02 RX ORDER — SEVELAMER HYDROCHLORIDE 800 MG/1
800 TABLET, FILM COATED ORAL
Status: DISCONTINUED | OUTPATIENT
Start: 2023-06-02 | End: 2023-06-05 | Stop reason: HOSPADM

## 2023-06-02 RX ORDER — DIVALPROEX SODIUM 125 MG/1
250 CAPSULE, COATED PELLETS ORAL EVERY 12 HOURS SCHEDULED
Status: DISCONTINUED | OUTPATIENT
Start: 2023-06-02 | End: 2023-06-05 | Stop reason: HOSPADM

## 2023-06-02 RX ORDER — METOPROLOL SUCCINATE 50 MG/1
50 TABLET, EXTENDED RELEASE ORAL 2 TIMES DAILY
Status: DISCONTINUED | OUTPATIENT
Start: 2023-06-02 | End: 2023-06-05 | Stop reason: HOSPADM

## 2023-06-02 RX ORDER — INSULIN LISPRO 100 [IU]/ML
1-5 INJECTION, SOLUTION INTRAVENOUS; SUBCUTANEOUS
Status: DISCONTINUED | OUTPATIENT
Start: 2023-06-03 | End: 2023-06-05 | Stop reason: HOSPADM

## 2023-06-02 RX ORDER — ATORVASTATIN CALCIUM 40 MG/1
40 TABLET, FILM COATED ORAL
Status: DISCONTINUED | OUTPATIENT
Start: 2023-06-02 | End: 2023-06-05 | Stop reason: HOSPADM

## 2023-06-02 RX ADMIN — SEVELAMER HYDROCHLORIDE 800 MG: 800 TABLET ORAL at 21:16

## 2023-06-02 RX ADMIN — ATORVASTATIN CALCIUM 40 MG: 40 TABLET, FILM COATED ORAL at 21:15

## 2023-06-02 RX ADMIN — DOCUSATE SODIUM 100 MG: 100 CAPSULE, LIQUID FILLED ORAL at 21:15

## 2023-06-02 RX ADMIN — METOPROLOL SUCCINATE 50 MG: 50 TABLET, EXTENDED RELEASE ORAL at 21:15

## 2023-06-02 RX ADMIN — HEPARIN SODIUM 5000 UNITS: 5000 INJECTION INTRAVENOUS; SUBCUTANEOUS at 21:15

## 2023-06-02 RX ADMIN — DIVALPROEX SODIUM 250 MG: 125 CAPSULE ORAL at 21:15

## 2023-06-02 RX ADMIN — INSULIN LISPRO 1 UNITS: 100 INJECTION, SOLUTION INTRAVENOUS; SUBCUTANEOUS at 21:29

## 2023-06-02 RX ADMIN — SACUBITRIL AND VALSARTAN 1 TABLET: 24; 26 TABLET, FILM COATED ORAL at 21:16

## 2023-06-02 NOTE — H&P
[address]  H&P  Name: Liane Rizzo 61 y o  male I MRN: [id number]  Unit/Bed#: CW2 210-01 I Date of Admission: 6/2/2023   Date of Service: 6/2/2023 I Hospital Day: 0      Assessment/Plan   * Chest pain  Assessment & Plan  · Patient came to the hospital with chest pain while on dialysis  Reported that during the dialysis he developed chest pain which lasted about 30 minutes or so  No associated diaphoresis shortness of breath nausea vomiting or palpitation  · Patient was in the hospital in March with similar complaints  Troponin elevation noted but patient with chronic troponin elevation and delta troponin is trending down at this time  · Consult cardiology  · Continue with aspirin statin Effient and beta-blocker    Elevated troponin  Assessment & Plan  · Troponin elevation noted point  ·  POC  troponin is 494, dated 5/13 and third troponin is 401  · Patient with chronic troponin elevation given his congestive heart failure and end-stage renal disease on dialysis  · Consult cardiology  · EKG reviewed  Mood disorder [facility])  Assessment & Plan  · Continue with outpatient Depakote    Ischemic cardiomyopathy  Assessment & Plan  · Patient with ischemic cardiomyopathy with ejection fraction 35% on most recent echocardiogram in April of this year  · Recently in 1/3/2023 echocardiogram showed ejection fraction of 25-30%   · Currently on beta-blocker and Entresto  · Fluid removal through dialysis  · Thelda Burgess was recently started and family is concerned that Thelda Burgess is possibly causing the hypotension and dialysis today  Patient did not take Entresto in the morning of dialysis  · Patient has LifeVest on  · Consult cardiology    CAD, multiple vessel  Assessment & Plan  • Patient underwent cardiac cath on 2/1/2023 which showed multivessel CAD with marked progression since the prior cath in May 2022, including stent restenosis and occlusion   S/p ADE to mid LAD at that time "  • Continue home aspirin, Effient, statin, metoprolol  • Patient with chest pain today  • Consult cardiology  • Trend troponin    Primary hypertension  Assessment & Plan  · Monitor blood pressure  · Continue with metoprolol and Entresto-Entresto was started recently by cardiology  Family is concerned that [facility] may be lowering his blood pressure and dialysis    ESRD on dialysis [facility])  Assessment & Plan  Lab Results   Component Value Date    CREATININE 4 61 (H) 06/02/2023    CREATININE 3 94 (H) 03/27/2023    CREATININE 8 37 (H) 03/10/2023    EGFR 12 06/02/2023    EGFR 15 03/27/2023    EGFR 6 03/10/2023   Patient with end-stage renal disease on dialysis Monday Wednesday Friday  Patient and family reported that he only had short dialysis session today due to chest pain and hypertension  Consult nephrology  Family is asking about dialysis tomorrow    Type 2 diabetes mellitus with chronic kidney disease on chronic dialysis, without long-term current use of insulin [facility])  Assessment & Plan  Lab Results   Component Value Date    HGBA1C 4 9 04/18/2023       No results for input(s): \"POCGLU\" in the last 72 hours  Blood Sugar Average: Last 72 hrs: Insulin sliding scale while inpatient          VTE Pharmacologic Prophylaxis: VTE Score: 3 Moderate Risk (Score 3-4) - Pharmacological DVT Prophylaxis Ordered: heparin  Code Status: Level 1 - Full Code   Discussion with family: Updated  (wife) at bedside / son     Anticipated Length of Stay: Patient will be admitted on an observation basis with an anticipated length of stay of less than 2 midnights secondary to Chest pain      Total Time Spent on Date of Encounter in care of patient: 75 minutes This time was spent on one or more of the following: performing physical exam; counseling and coordination of care; obtaining or reviewing history; documenting in the medical record; reviewing/ordering tests, medications or procedures; communicating with other " healthcare professionals and discussing with patient's family/caregivers  Chief Complaint:  Chest pain     History of Present Illness:  Michelle Raya is a 61 y o  male with a PMH of multivessel coronary artery disease cardiomyopathy with ejection fraction 35% on previous echocardiogram end-stage renal disease on dialysis, type 2 diabetes mellitus, hypertension hyperlipidemia who presents with chest pain  Patient was in dialysis when he developed chest pain  Chest pain lasted for about 30 minutes also  Patient was recently in the hospital in March of this year with similar presentation  Patient had an admission to the hospital in January of this year with shortness of breath and chest pain  Patient found to have type I NSTEMI and had cardiac catheterization with ADE to LAD in-stent restenosis  Patient reported that he was started on Entresto recently and today during dialysis he was hypotensive  Patient and family is concerned that Josy Ro is causing the hypotension during dialysis  Patient reported that he does not take Entresto in the morning of dialysis  Chest pain was not associated with any shortness of breath nausea vomiting diaphoresis or palpitation  Patient was able to finish an hour and a half session of dialysis today and dialysis was cut short due to hypotension  His blood pressure was down to 80s during dialysis patient did not have any chest pain during the rest of the dialysis  While in the hospital he did not have any further chest pain either  Patient was brought to the hospital for further evaluation  Noted to have slight troponin elevation  By reviewing the records patient always have mild troponin elevation which is likely secondary to his end-stage renal disease and also congestive heart failure  Currently he is feeling well and he feels like he is good to go home    Patient reported that he vomited times 1 in the morning for dialysis    Review of Systems:  Review of Systems Constitutional: Negative  HENT: Negative  Eyes: Negative  Cardiovascular: Positive for chest pain  Gastrointestinal: Positive for vomiting  Endocrine: Negative  Genitourinary: Negative  Musculoskeletal: Negative  Skin: Negative  Allergic/Immunologic: Negative  Neurological: Negative  Psychiatric/Behavioral: Negative  Negative for agitation  Past Medical and Surgical History:   Past Medical History:   Diagnosis Date   • Cerebrovascular accident (CVA) due to thrombosis of left middle cerebral artery ([facility] ) 07/29/2018   • Chronic kidney disease    • Diabetes mellitus ([place] [address] )    • Dialysis patient [facility])     M-W-F   • GERD (gastroesophageal reflux disease)    • Hypercholesteremia    • Hyperlipidemia    • Hypertension    • 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   • Neuropathy    • Obesity    • Osteomyelitis ([place] [address] )     last assessed 11/4/16   • PVC's (premature ventricular contractions)     sees cardiology Dr Suzan camargo   • Stroke [facility])     last weeof July 2018 [address] [place] [address]   • TIA (transient ischemic attack) 10/28/2018       Past Surgical History:   Procedure Laterality Date   • ABDOMINAL SURGERY     • CARDIAC CATHETERIZATION N/A 5/2/2022    Procedure: Cardiac Coronary Angiogram;  Surgeon: Jacob Woodson MD;  [facility] CARDIAC CATH [facility]; Service: Cardiology   • CARDIAC CATHETERIZATION N/A 5/2/2022    Procedure: Cardiac pci;  Surgeon: Jacob Woodson MD;  [facility]: [facility] CARDIAC CATH LAB; Service: Cardiology   • CARDIAC CATHETERIZATION  2/1/2023    Procedure: Cardiac catheterization;  Surgeon: Jacob Woodson MD;  [facility]: [facility] CARDIAC [facility]ATH LAB; Service: Cardiology   • CARDIAC CATHETERIZATION N/A 2/1/2023    Procedure: Cardiac pci;  Surgeon: Jacob Woodson MD;  Location: [facility] CARDIAC CATH LAB; Service: Cardiology   • CARDIAC CATHETERIZATION N/A 2/1/2023    Procedure: Cardiac Coronary Angiogram;  Surgeon: Jacob Woodson MD;  Location: [facility] CARDIAC CATH LAB;   Service: Cardiology "  • CARDIAC CATHETERIZATION N/A 2/1/2023    Procedure: Cardiac other-IVUS;  Surgeon: Bud Melissa MD;  Location: [facility] CARDIAC CATH LAB; Service: Cardiology   • CHOLECYSTECTOMY      Percutaneous   • COLONOSCOPY     • CYSTOSCOPY     • OTHER SURGICAL HISTORY      \"stimulator to control bowel movements\"   • [place] ESOPHAGOGASTRODUODENOSCOPY TRANSORAL DIAGNOSTIC N/A 9/27/2016    Procedure: ESOPHAGOGASTRODUODENOSCOPY (EGD); Surgeon: Edy Magallon MD;  Location: [facility] GI LAB; Service: Gastroenterology   • [place] LAPAROSCOPY SURG CHOLECYSTECTOMY N/A 2/29/2016    Procedure: LAPAROSCOPIC CHOLECYSTECTOMY ;  Surgeon: Angella Liu DO;  Location: [facility] Main OR;  Service: General   • ROTATOR CUFF REPAIR Right    • TOE AMPUTATION Right 10/28/2016    Procedure: 3RD TOE AMPUTATION ;  Surgeon: Sarabjit Mistry DPM;  Location: [facility] Main OR;  Service:        Meds/Allergies:  Prior to Admission medications    Medication Sig Start Date End Date Taking? Authorizing Provider   aspirin (ECOTRIN LOW STRENGTH) 81 mg EC tablet Take 81 mg by mouth daily Resume on 8/14     Yes Historical Provider, MD   atorvastatin (LIPITOR) 40 mg tablet TAKE 1 TABLET BY MOUTH EVERY DAY WITH DINNER 12/16/22  Yes Lindsay Luevano DO   collagenase (SANTYL) ointment Apply topically daily 5/16/23  Yes Breonna Waller DPM   D3-50 1 25 MG (30797 UT) capsule TAKE 1 CAPSULE BY MOUTH ONE TIME PER WEEK 3/15/23  Yes Ynes Land MD   divalproex sodium (DEPAKOTE SPRINKLE) 125 MG capsule TAKE 2 CAPSULES (250 MG TOTAL) BY MOUTH EVERY 12 (TWELVE) HOURS 2/16/23  Yes Raj Auguste DO   metoprolol succinate (TOPROL-XL) 50 mg 24 hr tablet Take 1 tablet (50 mg total) by mouth 2 (two) times a day 3/17/23  Yes Paul Collins MD   prasugrel (EFFIENT) tablet Take 1 tablet (5 mg total) by mouth daily 2/14/23  Yes BRITTANY Huitron   sacubitril-valsartan (Entresto) 24-26 MG TABS Take 1 tablet by mouth 2 (two) times a day Can hold morning dose on dialysis days   5/25/23  Yes Marc Nowak MD " Sevelamer Carbonate 2 4 g PACK VIGOROUSLY MIX CONTENTS OF 1 PACKET IN WATER (IT WILL NOT DISSOLVE) AND DRINK 3 TIMES DAILY WITH MEALS 4/21/23  Yes Historical Provider, MD   Blood Glucose Monitoring Suppl (True Metrix Meter) w/Device KIT Use to test blood sugars 3 times daily 7/1/22   Brendan Ramirez MD   Blood Pressure Monitoring (BLOOD PRESSURE CUFF) MISC Use to check blood pressure before taking blood pressure medication and 1 hour after and follow instructions provided in discharge instructions based on the readings  8/13/18   Glen No MD   glucose blood (True Metrix Blood Glucose Test) test strip Use 1 each 3 (three) times a day Use as instructed 3/15/23   Brendan Ramirez MD   [facility] MISC by Does not apply route 3 (three) times a day 11/27/18   Rajkishor Lairdbrenden Hernandez,      I have reviewed home medications with patient personally      Allergies: No Known Allergies    Social History:  Marital Status: /Civil Union   Occupation:   Patient Pre-hospital Living Situation: Home  Patient Pre-hospital Level of Mobility: walks  Patient Pre-hospital Diet Restrictions:   Substance Use History:   Social History     Substance and Sexual Activity   Alcohol Use Not Currently     Social History     Tobacco Use   Smoking Status Never   Smokeless Tobacco Never     Social History     Substance and Sexual Activity   Drug Use No       Family History:  Family History   Problem Relation Age of Onset   • Leukemia Mother    • Liver disease Mother    • Lung cancer Mother         heavy smoker - 3 ppd   • Heart disease Father    • Liver disease Father    • Diabetes type I Father    • Multiple myeloma Sister    • Breast cancer Sister    • Urolithiasis Family    • Alcohol abuse Neg Hx    • Depression Neg Hx    • Drug abuse Neg Hx    • Substance Abuse Neg Hx    • Mental illness Neg Hx        Physical Exam:     Vitals:   Blood Pressure: 130/72 (06/02/23 1517)  Pulse: 77 (06/02/23 1517)  Temperature: 97 7 °F (36 5 °C) (06/02/23 1517)  Temp Source: Oral (06/02/23 1517)  Respirations: 18 (06/02/23 1517)  SpO2: 97 % (06/02/23 1517)    Physical Exam  Constitutional:       Appearance: Normal appearance  He is obese  HENT:      Head: Normocephalic and atraumatic  Nose: Nose normal    Eyes:      General: No scleral icterus  Cardiovascular:      Rate and Rhythm: Normal rate and regular rhythm  Pulmonary:      Effort: Pulmonary effort is normal  No respiratory distress  Comments: Decreased breath sounds bilateral  Abdominal:      General: Abdomen is flat  There is no distension  Musculoskeletal:         General: Normal range of motion  Comments: Left upper extremity AV fistula   Skin:     General: Skin is warm  Coloration: Skin is not jaundiced  Neurological:      Mental Status: He is alert  Mental status is at baseline  Cranial Nerves: No cranial nerve deficit         Additional Data:     Lab Results:  Results from last 7 days   Lab Units 06/02/23  1538   EOS PCT % 2   HEMATOCRIT % 33 2*   HEMOGLOBIN g/dL 10 7*   LYMPHS PCT % 24   MONOS PCT % 12   NEUTROS PCT % 61   PLATELETS Thousands/uL 96*   WBC Thousand/uL 3 86*     Results from last 7 days   Lab Units 06/02/23  1538   ANION GAP mmol/L 2*   ALBUMIN g/dL 3 3*   ALK PHOS U/L 80   ALT U/L 13   AST U/L 10   BUN mg/dL 33*   CALCIUM mg/dL 8 8   CHLORIDE mmol/L 99   CO2 mmol/L 32   CREATININE mg/dL 4 61*   GLUCOSE RANDOM mg/dL 161*   POTASSIUM mmol/L 3 6   SODIUM mmol/L 133*   TOTAL BILIRUBIN mg/dL 0 67                       Lines/Drains:  Invasive Devices     Peripheral Intravenous Line  Duration           Peripheral IV 06/02/23 Right Antecubital <1 day          Line  Duration           Hemodialysis AV Fistula Left Upper arm -- days                    Imaging: cxr    XR chest 1 view portable   ED Interpretation by Kota Madison MD (06/02 1618)   Patchy opacity in right lung          EKG and Other Studies Reviewed on Admission:   · EKG: Sinus rhythm ventricular rate is 74  T wave abnormality which is documented previously march    ** Please Note: This note has been constructed using a voice recognition system   **

## 2023-06-02 NOTE — PROGRESS NOTES
[facility] patient  ADT notification received for patient currently in triage at [facility] ([facility]) Emergency Department for c/o chest pain  This RN Care manager will continue to monitor via chart review throughout hospitalization

## 2023-06-02 NOTE — ED ATTENDING ATTESTATION
6/2/2023  Radha CUNHA Arm, DO, saw and evaluated the patient  I have discussed the patient with the resident/non-physician practitioner and agree with the resident's/non-physician practitioner's findings, Plan of Care, and MDM as documented in the resident's/non-physician practitioner's note, except where noted  All available labs and Radiology studies were reviewed  I was present for key portions of any procedure(s) performed by the resident/non-physician practitioner and I was immediately available to provide assistance  At this point I agree with the current assessment done in the Emergency Department  I have conducted an independent evaluation of this patient a history and physical is as follows:    Patient is a 44-year-old male with history of hypertension, hyperlipidemia, nephrotic range proteinuria, end-stage renal disease on dialysis accompanied by his wife  Patient had dialysis today as regular scheduled, he gets it Monday, Wednesday, Friday, and today, Friday, about half an hour into dialysis session he had about 30 minutes of some chest pressure discomfort  He says that he gets this occasionally during dialysis and was not concerned about it however staff there was reportedly concerned and recommended he be evaluated in the emergency department, assessed because he transiently had a blood pressure of 80 systolic  He says after half an hour the chest discomfort resolved completely  His wife indicates that he only completed about an hour to an hour and a half of dialysis and he was told by dialysis staff that if he does not get admitted tonight that he should return tomorrow, Saturday, to complete his dialysis session  The patient says that when the discomfort was there it was not pleuritic in nature, not knifelike, ripping, or tearing  There is no associated nausea or diaphoresis  He says right now he feels totally fine      General:  Patient is well-appearing  Head:  Atraumatic  Eyes: Conjunctiva pink  ENT:  Mucous membranes are moist  Neck:  Supple  Cardiac:  S1-S2, without murmurs  Lungs:  Clear to auscultation bilaterally  Abdomen:  Soft, nontender, normal bowel sounds, no CVA tenderness, no tympany, no rigidity, no guarding  Extremities:  Normal range of motion, bandage over his left foot, there are superficial ulcer there which is being cared for and regular bandage changes  No surrounding erythema  He has a left arm AV fistula, good thrill    He has no pedal edema or calf asymmetry  Neurologic:  Awake, fluent speech, normal comprehension, AAOx3  Skin:  Pink warm and dry  Psychiatric:  Alert, pleasant, cooperative      ED Course  ED Course as of 06/02/23 1538   Fri Jun 02, 2023   1531 ECG interpreted by me, sinus rhythm, rate of 71, right bundle branch block, some lateral ST depression which is new when compared with March 8, 2023, otherwise there are no acute changes     XR chest 1 view portable   ED Interpretation   Patchy opacity in right lung        Labs Reviewed   CBC AND DIFFERENTIAL - Abnormal       Result Value Ref Range Status    WBC 3 86 (*) 4 31 - 10 16 Thousand/uL Final    RBC 3 55 (*) 3 88 - 5 62 Million/uL Final    Hemoglobin 10 7 (*) 12 0 - 17 0 g/dL Final    Hematocrit 33 2 (*) 36 5 - 49 3 % Final    MCV 94  82 - 98 fL Final    MCH 30 1  26 8 - 34 3 pg Final    MCHC 32 2  31 4 - 37 4 g/dL Final    RDW 15 4 (*) 11 6 - 15 1 % Final    MPV 11 3  8 9 - 12 7 fL Final    Platelets 96 (*) 186 - 390 Thousands/uL Final    Comment: Manual Review of Smear Performed    nRBC 0  /100 WBCs Final    Neutrophils Relative 61  43 - 75 % Final    Immat GRANS % 1  0 - 2 % Final    Lymphocytes Relative 24  14 - 44 % Final    Monocytes Relative 12  4 - 12 % Final    Eosinophils Relative 2  0 - 6 % Final    Basophils Relative 0  0 - 1 % Final    Neutrophils Absolute 2 35  1 85 - 7 62 Thousands/µL Final    Immature Grans Absolute 0 02  0 00 - 0 20 Thousand/uL Final    Lymphocytes Absolute 0 94  0 60 - 4 47 Thousands/µL Final    Monocytes Absolute 0 48  0 17 - 1 22 Thousand/µL Final    Eosinophils Absolute 0 06  0 00 - 0 61 Thousand/µL Final    Basophils Absolute 0 01  0 00 - 0 10 Thousands/µL Final   COMPREHENSIVE METABOLIC PANEL - Abnormal    Sodium 133 (*) 135 - 147 mmol/L Final    Potassium 3 6  3 5 - 5 3 mmol/L Final    Chloride 99  96 - 108 mmol/L Final    CO2 32  21 - 32 mmol/L Final    ANION GAP 2 (*) 4 - 13 mmol/L Final    BUN 33 (*) 5 - 25 mg/dL Final    Creatinine 4 61 (*) 0 60 - 1 30 mg/dL Final    Comment: Standardized to IDMS reference method    Glucose 161 (*) 65 - 140 mg/dL Final    Comment: If the patient is fasting, the ADA then defines impaired fasting glucose as > 100 mg/dL and diabetes as > or equal to 123 mg/dL  Specimen collection should occur prior to Sulfasalazine administration due to the potential for falsely depressed results  Specimen collection should occur prior to Sulfapyridine administration due to the potential for falsely elevated results  Calcium 8 8  8 3 - 10 1 mg/dL Final    Corrected Calcium 9 4  8 3 - 10 1 mg/dL Final    AST 10  5 - 45 U/L Final    Comment: Specimen collection should occur prior to Sulfasalazine administration due to the potential for falsely depressed results  ALT 13  12 - 78 U/L Final    Comment: Specimen collection should occur prior to Sulfasalazine and/or Sulfapyridine administration due to the potential for falsely depressed results  Alkaline Phosphatase 80  46 - 116 U/L Final    Total Protein 6 7  6 4 - 8 4 g/dL Final    Albumin 3 3 (*) 3 5 - 5 0 g/dL Final    Total Bilirubin 0 67  0 20 - 1 00 mg/dL Final    Comment: Use of this assay is not recommended for patients undergoing treatment with eltrombopag due to the potential for falsely elevated results      eGFR 12  ml/min/1 73sq m Final    Narrative:     [place] guidelines for Chronic Kidney Disease (CKD):   •  Stage 1 with normal or high GFR (GFR > 90 "mL/min/1 73 square meters)  •  Stage 2 Mild CKD (GFR = 60-89 mL/min/1 73 square meters)  •  Stage 3A Moderate CKD (GFR = 45-59 mL/min/1 73 square meters)  •  Stage 3B Moderate CKD (GFR = 30-44 mL/min/1 73 square meters)  •  Stage 4 Severe CKD (GFR = 15-29 mL/min/1 73 square meters)  •  Stage 5 End Stage CKD (GFR <15 mL/min/1 73 square meters)  Note: GFR calculation is accurate only with a steady state creatinine   HS TROPONIN I 0HR - Abnormal    hs TnI 0hr 401 (*) \"Refer to ACS Flowchart\"- see link ng/L Final    Comment:                                              Initial (time 0) result  If >=50 ng/L, Myocardial injury suggested ;  Type of myocardial injury and treatment strategy  to be determined  If 5-49 ng/L, a delta result at 2 hours and or 4 hours will be needed to further evaluate  If <4 ng/L, and chest pain has been >3 hours since onset, patient may qualify for discharge based on the HEART score in the ED  If <5 ng/L and <3hours since onset of chest pain, a delta result at 2 hours will be needed to further evaluate  HS Troponin 99th Percentile URL of a Health Population=12 ng/L with a 95% Confidence Interval of 8-18 ng/L  Second Troponin (time 2 hours)  If calculated delta >= 20 ng/L,  Myocardial injury suggested ; Type of myocardial injury and treatment strategy to be determined  If 5-49 ng/L and the calculated delta is 5-19 ng/L, consult medical service for evaluation  Continue evaluation for ischemia on ecg and other possible etiology and repeat hs troponin at 4 hours  If delta is <5 ng/L at 2 hours, consider discharge based on risk stratification via the HEART score (if in ED), or JALIL risk score in IP/Observation  HS Troponin 99th Percentile URL of a Health Population=12 ng/L with a 95% Confidence Interval of 8-18 ng/L    HS TROPONIN I 2HR   HS TROPONIN I 4HR     On reassessment patient continues to be asymptomatic    Concern about the possibility of ACS given his elevated troponin and " ECG changes  He is currently asymptomatic, no chest pain  His chest x-ray shows a possible patchy infiltrate however he has no cough, no congestion, no signs of pneumonia and I do not believe this is clinically significant  Plan is for aspirin, discussion with medicine and anticipate admission  Signed out to Dr Sarai Irene    The differential diagnosis before testing included (but is not limited to) nonspecific chest pain, dialysis disequilibrium syndrome, acute hyperkalemia and acute coronary syndrome which is a medical condition that poses a threat to life/function  Patient presents with acute new problem with:  Threat to life or bodily function    Chronic conditions affecting care:  As per HPI    COLLECTION AND INTERPRETATION OF DATA  Additional history obtained from: Wife  I reviewed prior external notes, including March 8, 2023 ECG    I ordered each unique test  Tests reviewed personally by me:  ECG: See my ED course  Labs: see above  Imaging: I independently reviewed the chest x-ray as noted above          Critical Care Time  Procedures

## 2023-06-02 NOTE — ED PROVIDER NOTES
History  Chief Complaint   Patient presents with   • Chest Pain     Per EMS patient coming from dialysis where he reported chest pain all morning but refused to come for treatment as he routinely experiences chest pain on dialysis days  Following dialysis pt with BP 22H systolic and advised to come to the ER for further eval  Patient denies chest pain at this time  Patient is a 80-year-old male with history of CAD, hypertension, hyperlipidemia, diabetes, CKD with end-stage renal disease on dialysis Monday Wednesday Friday who presents with chest pain  Patient states that he was at dialysis this morning  He says that he developed chest pain at that time which she says is typical for him at dialysis  He denied any palpitations, shortness of breath, cough, abdominal pain, nausea, or vomiting at the time  He states that they took his blood pressure dialysis and that it was in the 17U systolic which concerned them and they said they should be brought to the emergency department  Patient states that currently he has no symptoms and feels at his baseline  Patient's wife accompanies him and notes that he did have 1 episode of vomiting this morning that was unprovoked but nonbilious and nonbloody  Patient denies any recent hospitalizations, recent ulcerations, recent trauma, recent surgeries  Patient states that he only had an hour of his dialysis this morning, they told him that if he was not admitted that they would continue it tomorrow  Prior to Admission Medications   Prescriptions Last Dose Informant Patient Reported? Taking?    Blood Glucose Monitoring Suppl (True Metrix Meter) w/Device KIT  Spouse/Significant Other, Child No No   Sig: Use to test blood sugars 3 times daily   Blood Pressure Monitoring (BLOOD PRESSURE CUFF) MISC  Spouse/Significant Other, Child No No   Sig: Use to check blood pressure before taking blood pressure medication and 1 hour after and follow instructions provided in discharge instructions based on the readings  D3-50 1 25 MG (41793 UT) capsule Past Week Spouse/Significant Other, Child No Yes   Sig: TAKE 1 CAPSULE BY MOUTH ONE TIME PER WEEK   ONETOUCH DELYOLANDA LANCETS 11I MISC  Spouse/Significant Other, Child No No   Sig: by Does not apply route 3 (three) times a day   Sevelamer Carbonate 2 4 g PACK 6/2/2023 Spouse/Significant Other, Child Yes Yes   Sig: VIGOROUSLY MIX CONTENTS OF 1 PACKET IN WATER (IT WILL NOT DISSOLVE) AND DRINK 3 TIMES DAILY WITH MEALS   aspirin (ECOTRIN LOW STRENGTH) 81 mg EC tablet 6/2/2023 Spouse/Significant Other, Child Yes Yes   Sig: Take 81 mg by mouth daily Resume on 8/14     atorvastatin (LIPITOR) 40 mg tablet 6/1/2023 Spouse/Significant Other, Child No Yes   Sig: TAKE 1 TABLET BY MOUTH EVERY DAY WITH DINNER   collagenase (SANTYL) ointment 6/1/2023 Child, Spouse/Significant Other No Yes   Sig: Apply topically daily   divalproex sodium (DEPAKOTE SPRINKLE) 125 MG capsule 6/2/2023 Spouse/Significant Other, Child No Yes   Sig: TAKE 2 CAPSULES (250 MG TOTAL) BY MOUTH EVERY 12 (TWELVE) HOURS   glucose blood (True Metrix Blood Glucose Test) test strip  Spouse/Significant Other, Child No No   Sig: Use 1 each 3 (three) times a day Use as instructed   metoprolol succinate (TOPROL-XL) 50 mg 24 hr tablet 6/2/2023 Spouse/Significant Other, Child No Yes   Sig: Take 1 tablet (50 mg total) by mouth 2 (two) times a day   prasugrel (EFFIENT) tablet 6/2/2023 Spouse/Significant Other, Child No Yes   Sig: Take 1 tablet (5 mg total) by mouth daily   sacubitril-valsartan (Entresto) 24-26 MG TABS 6/2/2023  No Yes   Sig: Take 1 tablet by mouth 2 (two) times a day Can hold morning dose on dialysis days        Facility-Administered Medications: None       Past Medical History:   Diagnosis Date   • Cerebrovascular accident (CVA) due to thrombosis of left middle cerebral artery ([facility] ) 07/29/2018   • Chronic kidney disease    • Diabetes mellitus ([facility] )    • Dialysis patient ([facility] ) "M-W-F   • GERD (gastroesophageal reflux disease)    • Hypercholesteremia    • Hyperlipidemia    • Hypertension    • 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   • Neuropathy    • Obesity    • Osteomyelitis ([address] )     last assessed 11/4/16   • PVC's (premature ventricular contractions)     sees cardiology Dr Osmani camargo   • Stroke [facility])     last weeof July 2018 [address] [facility]   • TIA (transient ischemic attack) 10/28/2018       Past Surgical History:   Procedure Laterality Date   • ABDOMINAL SURGERY     • CARDIAC CATHETERIZATION N/A 5/2/2022    Procedure: Cardiac Coronary Angiogram;  Surgeon: Rosas You MD;  Location: [facility] CARDIAC CATH LAB; Service: Cardiology   • CARDIAC CATHETERIZATION N/A 5/2/2022    Procedure: Cardiac pci;  Surgeon: Rosas You MD;  Location: [facility] CARDIAC CATH [facility]; Service: Cardiology   • CARDIAC CATHETERIZATION  2/1/2023    Procedure: Cardiac catheterization;  Surgeon: Rosas You MD;  Location: [facility] CARDIAC CATH LAB; Service: Cardiology   • CARDIAC CATHETERIZATION N/A 2/1/2023    Procedure: Cardiac pci;  Surgeon: Rosas You MD;  [facility]: [facility] CARDIAC CATH [facility]; Service: Cardiology   • CARDIAC CATHETERIZATION N/A 2/1/2023    Procedure: Cardiac Coronary Angiogram;  Surgeon: Rosas You MD;  Location: [facility] CARDIAC CATH LAB; Service: Cardiology   • CARDIAC CATHETERIZATION N/A 2/1/2023    Procedure: Cardiac other-IVUS;  Surgeon: Rosas You MD;  Location: [facility] CARDIAC CATH LAB; Service: Cardiology   • CHOLECYSTECTOMY      Percutaneous   • COLONOSCOPY     • CYSTOSCOPY     • OTHER SURGICAL HISTORY      \"stimulator to control bowel movements\"   • UT ESOPHAGOGASTRODUODENOSCOPY TRANSORAL DIAGNOSTIC N/A 9/27/2016    Procedure: ESOPHAGOGASTRODUODENOSCOPY (EGD); Surgeon: Jayleen Champion MD;  Location: [facility] GI LAB;   Service: Gastroenterology   • UT LAPAROSCOPY SURG CHOLECYSTECTOMY N/A 2/29/2016    Procedure: LAPAROSCOPIC CHOLECYSTECTOMY ;  Surgeon: Rosemary Champagne DO;  Location: [facility] Main OR;  " Service: General   • ROTATOR CUFF REPAIR Right    • TOE AMPUTATION Right 10/28/2016    Procedure: 3RD TOE AMPUTATION ;  Surgeon: Dae Tatum DPM;  Location: [facility] Main OR;  Service:        Family History   Problem Relation Age of Onset   • Leukemia Mother    • Liver disease Mother    • Lung cancer Mother         heavy smoker - 3 ppd   • Heart disease Father    • Liver disease Father    • Diabetes type I Father    • Multiple myeloma Sister    • Breast cancer Sister    • Urolithiasis Family    • Alcohol abuse Neg Hx    • Depression Neg Hx    • Drug abuse Neg Hx    • Substance Abuse Neg Hx    • Mental illness Neg Hx      I have reviewed and agree with the history as documented  E-Cigarette/Vaping   • E-Cigarette Use Never User      E-Cigarette/Vaping Substances   • Nicotine No    • THC No    • CBD No    • Flavoring No    • Other No    • Unknown No      Social History     Tobacco Use   • Smoking status: Never   • Smokeless tobacco: Never   Vaping Use   • Vaping Use: Never used   Substance Use Topics   • Alcohol use: Not Currently   • Drug use: No        Review of Systems   Constitutional: Negative for chills and fever  HENT: Negative for ear pain and sore throat  Eyes: Negative for pain and visual disturbance  Respiratory: Negative for cough and shortness of breath  Cardiovascular: Positive for chest pain  Negative for palpitations and leg swelling  Gastrointestinal: Positive for vomiting  Negative for abdominal pain, diarrhea and nausea  Musculoskeletal: Negative for arthralgias and back pain  Skin: Negative for color change and rash  Neurological: Negative for seizures and syncope  All other systems reviewed and are negative        Physical Exam  ED Triage Vitals [06/02/23 1517]   Temperature Pulse Respirations Blood Pressure SpO2   97 7 °F (36 5 °C) 77 18 130/72 97 %      Temp Source Heart Rate Source Patient Position - Orthostatic VS BP Location FiO2 (%)   Oral Monitor Sitting Right arm --      Pain Score       No Pain             Orthostatic Vital Signs  Vitals:    06/02/23 1517   BP: 130/72   Pulse: 77   Patient Position - Orthostatic VS: Sitting       Physical Exam  Vitals and nursing note reviewed  Constitutional:       General: He is not in acute distress  Appearance: Normal appearance  He is well-developed  He is obese  He is not ill-appearing, toxic-appearing or diaphoretic  HENT:      Head: Normocephalic and atraumatic  Right Ear: External ear normal       Left Ear: External ear normal       Nose: Nose normal  No congestion or rhinorrhea  Mouth/Throat:      Mouth: Mucous membranes are moist       Pharynx: Oropharynx is clear  No oropharyngeal exudate or posterior oropharyngeal erythema  Eyes:      General: No scleral icterus  Right eye: No discharge  Left eye: No discharge  Extraocular Movements: Extraocular movements intact  Conjunctiva/sclera: Conjunctivae normal       Pupils: Pupils are equal, round, and reactive to light  Cardiovascular:      Rate and Rhythm: Normal rate and regular rhythm  Pulses: Normal pulses  Radial pulses are 2+ on the right side and 2+ on the left side  Dorsalis pedis pulses are 2+ on the right side and 2+ on the left side  Heart sounds: Normal heart sounds  Heart sounds not distant  No murmur heard  No systolic murmur is present  No diastolic murmur is present  No friction rub  No gallop  No S3 or S4 sounds  Pulmonary:      Effort: Pulmonary effort is normal  No respiratory distress  Breath sounds: Normal breath sounds  No stridor  No decreased breath sounds, wheezing, rhonchi or rales  Chest:      Chest wall: No tenderness  Abdominal:      General: Abdomen is flat  Bowel sounds are normal  There is no distension  Palpations: Abdomen is soft  Tenderness: There is no abdominal tenderness  There is no right CVA tenderness, left CVA tenderness or guarding  Musculoskeletal:         General: No tenderness  Cervical back: Normal range of motion and neck supple  No rigidity or tenderness  Right lower leg: No tenderness  No edema  Left lower leg: No tenderness  No edema  Skin:     General: Skin is warm and dry  Capillary Refill: Capillary refill takes less than 2 seconds  Coloration: Skin is not jaundiced or pale  Comments: There is a small calcaneal ulcer on the right heel, nonerythematous, nonpurulent   Neurological:      General: No focal deficit present  Mental Status: He is alert and oriented to person, place, and time  Cranial Nerves: No cranial nerve deficit  Sensory: No sensory deficit  Motor: No weakness     Psychiatric:         Mood and Affect: Mood normal          Behavior: Behavior normal          ED Medications  Medications   aspirin (ECOTRIN LOW STRENGTH) EC tablet 81 mg (has no administration in time range)   divalproex sodium (DEPAKOTE SPRINKLE) capsule 250 mg (has no administration in time range)   metoprolol succinate (TOPROL-XL) 24 hr tablet 50 mg (has no administration in time range)   prasugrel (EFFIENT) tablet 5 mg (has no administration in time range)   sacubitril-valsartan (ENTRESTO) 24-26 MG per tablet 1 tablet (has no administration in time range)   atorvastatin (LIPITOR) tablet 40 mg (has no administration in time range)   cholecalciferol (VITAMIN D3) tablet 1,000 Units (has no administration in time range)   sevelamer (RENAGEL) tablet 800 mg (has no administration in time range)   insulin lispro (HumaLOG) 100 units/mL subcutaneous injection 1-5 Units (has no administration in time range)   insulin lispro (HumaLOG) 100 units/mL subcutaneous injection 1-5 Units (has no administration in time range)   acetaminophen (TYLENOL) tablet 650 mg (has no administration in time range)   docusate sodium (COLACE) capsule 100 mg (has no administration in time range)   senna (SENOKOT) tablet 8 6 mg (has no administration in time range)   ondansetron (ZOFRAN) injection 4 mg (has no administration in time range)   heparin (porcine) subcutaneous injection 5,000 Units (has no administration in time range)       Diagnostic Studies  Results Reviewed     Procedure Component Value Units Date/Time    HS Troponin I 4hr [[id number]]  (Abnormal) Collected: 06/02/23 1819    Lab Status: Final result Specimen: Blood from Arm, Right Updated: 06/02/23 1852     hs TnI 4hr 494 ng/L      Delta 4hr hsTnI 93 ng/L     HS Troponin I 2hr [[id number]]  (Abnormal) Collected: 06/02/23 1714    Lab Status: Final result Specimen: Blood from Arm, Right Updated: 06/02/23 1806     hs TnI 2hr 513 ng/L      Delta 2hr hsTnI 112 ng/L     CBC and differential [[id number]]  (Abnormal) Collected: 06/02/23 1538    Lab Status: Final result Specimen: Blood from Arm, Right Updated: 06/02/23 1633     WBC 3 86 Thousand/uL      RBC 3 55 Million/uL      Hemoglobin 10 7 g/dL      Hematocrit 33 2 %      MCV 94 fL      MCH 30 1 pg      MCHC 32 2 g/dL      RDW 15 4 %      MPV 11 3 fL      Platelets 96 Thousands/uL      nRBC 0 /100 WBCs      Neutrophils Relative 61 %      Immat GRANS % 1 %      Lymphocytes Relative 24 %      Monocytes Relative 12 %      Eosinophils Relative 2 %      Basophils Relative 0 %      Neutrophils Absolute 2 35 Thousands/µL      Immature Grans Absolute 0 02 Thousand/uL      Lymphocytes Absolute 0 94 Thousands/µL      Monocytes Absolute 0 48 Thousand/µL      Eosinophils Absolute 0 06 Thousand/µL      Basophils Absolute 0 01 Thousands/µL     HS Troponin 0hr (reflex protocol) [[id number]]  (Abnormal) Collected: 06/02/23 1538    Lab Status: Final result Specimen: Blood from Arm, Right Updated: 06/02/23 1621     hs TnI 0hr 401 ng/L     Comprehensive metabolic panel [[id number]]  (Abnormal) Collected: 06/02/23 1538    Lab Status: Final result Specimen: Blood from Arm, Right Updated: 06/02/23 1614     Sodium 133 mmol/L      Potassium 3 6 mmol/L      Chloride 99 mmol/L      CO2 32 mmol/L      ANION GAP 2 mmol/L      BUN 33 mg/dL      Creatinine 4 61 mg/dL      Glucose 161 mg/dL      Calcium 8 8 mg/dL      Corrected Calcium 9 4 mg/dL      AST 10 U/L      ALT 13 U/L      Alkaline Phosphatase 80 U/L      Total Protein 6 7 g/dL      Albumin 3 3 g/dL      Total Bilirubin 0 67 mg/dL      eGFR 12 ml/min/1 73sq m     Narrative:      [place] guidelines for Chronic Kidney Disease (CKD):   •  Stage 1 with normal or high GFR (GFR > 90 mL/min/1 73 square meters)  •  Stage 2 Mild CKD (GFR = 60-89 mL/min/1 73 square meters)  •  Stage 3A Moderate CKD (GFR = 45-59 mL/min/1 73 square meters)  •  Stage 3B Moderate CKD (GFR = 30-44 mL/min/1 73 square meters)  •  Stage 4 Severe CKD (GFR = 15-29 mL/min/1 73 square meters)  •  Stage 5 End Stage CKD (GFR <15 mL/min/1 73 square meters)  Note: GFR calculation is accurate only with a steady state creatinine                 XR chest 1 view portable   ED Interpretation by Shanique Rubio MD (06/02 1618)   Patchy opacity in right lung            Procedures  ECG 12 Lead Documentation Only    Date/Time: 6/2/2023 3:38 PM    Performed by: Shanique Rubio MD  Authorized by: Shanique Rubio MD    Indications / Diagnosis:  Chest pain  Patient location:  ED  Previous ECG:     Previous ECG:  Compared to current    Similarity:  Changes noted  Interpretation:     Interpretation: abnormal    Rate:     ECG rate:  71    ECG rate assessment: normal    Rhythm:     Rhythm: sinus rhythm    Ectopy:     Ectopy: none    QRS:     QRS axis:  Normal    QRS intervals:   Wide  Conduction:     Conduction: abnormal      Abnormal conduction: complete RBBB    ST segments:     ST segments:  Abnormal    Depression:  V3, V4, V5 and V6  T waves:     T waves: normal            ED Course  ED Course as of 06/02/23 2111 Fri Jun 02, 2023   1621 hs TnI 0hr(!): 401                                       Medical Decision Making  Patient is a 68-year-old male with history of CAD, hypertension, hyperlipidemia, diabetes, CKD with end-stage renal disease on dialysis Monday Wednesday Friday, CHF with [place] presents with chest pain  DDx includes but not limited to ACS, pneumothorax, pneumonia, arrhythmia, electro abnormality, anemia  Will obtain CBC, CMP, troponin, EKG, chest x-ray  Patient's labs reveal elevated troponin of 401  His EKG is concerning for new worsened ST depressions in V3 through V6  Chest x-ray shows some patchy opacity in the right lung however given he has no respiratory symptoms I doubt the clinical significance of this  Discussed case with admitting hospitalist who agrees to admit the patient for further ACS work-up  Amount and/or Complexity of Data Reviewed  Labs: ordered  Decision-making details documented in ED Course  Radiology: ordered and independent interpretation performed  Risk  Decision regarding hospitalization              Disposition  Final diagnoses:   Chest pain   CAD (coronary artery disease)   HTN (hypertension)   HLD (hyperlipidemia)   ESRD (end stage renal disease) on dialysis [facility])   CHF (congestive heart failure) ([place] [address] )     Time reflects when diagnosis was documented in both MDM as applicable and the Disposition within this note     Time User Action Codes Description Comment    6/2/2023  4:50 PM Deanna Grieve Add [R07 9] Chest pain     6/2/2023  4:50 PM Deanna Grieve Add [I25 10] CAD (coronary artery disease)     6/2/2023  4:50 PM Deanna Grieve Add [I10] HTN (hypertension)     6/2/2023  4:50 PM Deanna Grieve Add [E78 5] HLD (hyperlipidemia)     6/2/2023  4:50 PM Deanna Grieve Add [N18 6,  Z99 2] ESRD (end stage renal disease) on dialysis ([facility] )     6/2/2023  4:50 PM Deanna Grieve Add [I50 9] CHF (congestive heart failure) [facility])       ED Disposition     ED Disposition   Admit    Condition   Stable    Date/Time   Fri Jun 2, 2023  4:50 PM    Comment   Case was discussed with Dr Edson Villegas and the patient's admission status was agreed to be Admission Status: observation status to the service of Dr Agnieszka Billingsley   Follow-up Information    None         Current Discharge Medication List      CONTINUE these medications which have NOT CHANGED    Details   aspirin (ECOTRIN LOW STRENGTH) 81 mg EC tablet Take 81 mg by mouth daily Resume on 8/14        atorvastatin (LIPITOR) 40 mg tablet TAKE 1 TABLET BY MOUTH EVERY DAY WITH DINNER  Qty: 90 tablet, Refills: 1    Comments: DX Code Needed    Associated Diagnoses: Mixed hyperlipidemia      collagenase (SANTYL) ointment Apply topically daily  Qty: 90 g, Refills: 0    Associated Diagnoses: Ulcer of left heel, limited to breakdown of skin ([facility] ); Diabetic polyneuropathy associated with type 2 diabetes mellitus ([facility])      D3-50 1 25 MG (42773 UT) capsule TAKE 1 CAPSULE BY MOUTH ONE TIME PER WEEK  Qty: 12 capsule, Refills: 2    Associated Diagnoses: Vitamin D deficiency      divalproex sodium (DEPAKOTE SPRINKLE) 125 MG capsule TAKE 2 CAPSULES (250 MG TOTAL) BY MOUTH EVERY 12 (TWELVE) HOURS  Qty: 360 capsule, Refills: 1    Comments: F39  Associated Diagnoses: Anxiety associated with depression; Mood disorder ([facility])      metoprolol succinate (TOPROL-XL) 50 mg 24 hr tablet Take 1 tablet (50 mg total) by mouth 2 (two) times a day  Qty: 180 tablet, Refills: 3    Associated Diagnoses: CAD, multiple vessel; Chest pain, unspecified      prasugrel (EFFIENT) tablet Take 1 tablet (5 mg total) by mouth daily  Qty: 90 tablet, Refills: 3    Associated Diagnoses: Ischemic cardiomyopathy; Status post insertion of drug eluting coronary artery stent      sacubitril-valsartan (Entresto) 24-26 MG TABS Take 1 tablet by mouth 2 (two) times a day Can hold morning dose on dialysis days    Qty: 60 tablet, Refills: 5    Associated Diagnoses: Ischemic cardiomyopathy      Sevelamer Carbonate 2 4 g PACK VIGOROUSLY MIX CONTENTS OF 1 PACKET IN WATER (IT WILL NOT DISSOLVE) AND DRINK 3 TIMES DAILY WITH MEALS      Blood Glucose Monitoring Suppl (True Metrix Meter) w/Device KIT Use to test blood sugars 3 times daily  Qty: 1 kit, Refills: 0    Comments: Dx: E11 65  Associated Diagnoses: Type 2 diabetes mellitus with hyperglycemia, with long-term current use of insulin ([facility])      Blood Pressure Monitoring (BLOOD PRESSURE CUFF) MISC Use to check blood pressure before taking blood pressure medication and 1 hour after and follow instructions provided in discharge instructions based on the readings  Qty: 1 each, Refills: 0    Associated Diagnoses: Essential hypertension      glucose blood (True Metrix Blood Glucose Test) test strip Use 1 each 3 (three) times a day Use as instructed  Qty: 300 strip, Refills: 1    Associated Diagnoses: Type 2 diabetes mellitus with hyperglycemia, with long-term current use of insulin ([facility] )      ONETOUCH DELICA LANCETS 78V MISC by Does not apply route 3 (three) times a day  Qty: 270 each, Refills: 1    Associated Diagnoses: Type 2 diabetes mellitus with hypoglycemia without coma, with long-term current use of insulin ([facility] )           No discharge procedures on file  PDMP Review       Value Time User    PDMP Reviewed  Yes 11/12/2022  6:19 AM Jina Rizvi MD           ED Provider  Attending physically available and evaluated Nola Castleman I managed the patient along with the ED Attending      Electronically Signed by         Shanique Rubio MD  06/02/23 2111

## 2023-06-02 NOTE — ASSESSMENT & PLAN NOTE
· Patient with ischemic cardiomyopathy with ejection fraction 35% on most recent echocardiogram in April of this year  · Recently in 1/3/2023 echocardiogram showed ejection fraction of 25-30%   · Currently on beta-blocker and Entresto  · Fluid removal through dialysis  · Mardell Luster was recently started and family is concerned that Mardell Luster is possibly causing the hypotension and dialysis today    Patient did not take Entresto in the morning of dialysis  · Patient has LifeVest on  · Consult cardiology

## 2023-06-02 NOTE — ASSESSMENT & PLAN NOTE
"Lab Results   Component Value Date    HGBA1C 4 9 04/18/2023       No results for input(s): \"POCGLU\" in the last 72 hours  Blood Sugar Average: Last 72 hrs:   Insulin sliding scale while inpatient  "

## 2023-06-02 NOTE — ASSESSMENT & PLAN NOTE
· Troponin elevation noted point  ·  POC  troponin is 494, dated 5/13 and third troponin is 401  · Patient with chronic troponin elevation given his congestive heart failure and end-stage renal disease on dialysis  · Consult cardiology  · EKG reviewed

## 2023-06-02 NOTE — ASSESSMENT & PLAN NOTE
• Patient underwent cardiac cath on 2/1/2023 which showed multivessel CAD with marked progression since the prior cath in May 2022, including stent restenosis and occlusion   S/p ADE to mid LAD at that time   • Continue home aspirin, Effient, statin, metoprolol  • Patient with chest pain today  • Consult cardiology  • Trend troponin

## 2023-06-02 NOTE — ASSESSMENT & PLAN NOTE
· Patient came to the hospital with chest pain while on dialysis  Reported that during the dialysis he developed chest pain which lasted about 30 minutes or so  No associated diaphoresis shortness of breath nausea vomiting or palpitation  · Patient was in the hospital in March with similar complaints    Troponin elevation noted but patient with chronic troponin elevation and delta troponin is trending down at this time  · Consult cardiology  · Continue with aspirin statin Effient and beta-blocker

## 2023-06-02 NOTE — ASSESSMENT & PLAN NOTE
Lab Results   Component Value Date    CREATININE 4 61 (H) 06/02/2023    CREATININE 3 94 (H) 03/27/2023    CREATININE 8 37 (H) 03/10/2023    EGFR 12 06/02/2023    EGFR 15 03/27/2023    EGFR 6 03/10/2023   Patient with end-stage renal disease on dialysis Monday Wednesday Friday  Patient and family reported that he only had short dialysis session today due to chest pain and hypertension  Consult nephrology  Family is asking about dialysis tomorrow

## 2023-06-02 NOTE — ASSESSMENT & PLAN NOTE
· Monitor blood pressure  · Continue with metoprolol and Entresto-Entresto was started recently by cardiology    Family is concerned that [facility] may be lowering his blood pressure and dialysis

## 2023-06-03 LAB
ANION GAP SERPL CALCULATED.3IONS-SCNC: 3 MMOL/L (ref 4–13)
BUN SERPL-MCNC: 40 MG/DL (ref 5–25)
CALCIUM SERPL-MCNC: 8.5 MG/DL (ref 8.3–10.1)
CHLORIDE SERPL-SCNC: 102 MMOL/L (ref 96–108)
CO2 SERPL-SCNC: 30 MMOL/L (ref 21–32)
CREAT SERPL-MCNC: 6.16 MG/DL (ref 0.6–1.3)
ERYTHROCYTE [DISTWIDTH] IN BLOOD BY AUTOMATED COUNT: 15.6 % (ref 11.6–15.1)
GFR SERPL CREATININE-BSD FRML MDRD: 8 ML/MIN/1.73SQ M
GLUCOSE SERPL-MCNC: 104 MG/DL (ref 65–140)
GLUCOSE SERPL-MCNC: 108 MG/DL (ref 65–140)
GLUCOSE SERPL-MCNC: 144 MG/DL (ref 65–140)
GLUCOSE SERPL-MCNC: 90 MG/DL (ref 65–140)
GLUCOSE SERPL-MCNC: 91 MG/DL (ref 65–140)
HCT VFR BLD AUTO: 34 % (ref 36.5–49.3)
HGB BLD-MCNC: 10.2 G/DL (ref 12–17)
MCH RBC QN AUTO: 29.4 PG (ref 26.8–34.3)
MCHC RBC AUTO-ENTMCNC: 30 G/DL (ref 31.4–37.4)
MCV RBC AUTO: 98 FL (ref 82–98)
PLATELET # BLD AUTO: 103 THOUSANDS/UL (ref 149–390)
PMV BLD AUTO: 11.4 FL (ref 8.9–12.7)
POTASSIUM SERPL-SCNC: 4.2 MMOL/L (ref 3.5–5.3)
RBC # BLD AUTO: 3.47 MILLION/UL (ref 3.88–5.62)
SODIUM SERPL-SCNC: 135 MMOL/L (ref 135–147)
WBC # BLD AUTO: 4.76 THOUSAND/UL (ref 4.31–10.16)

## 2023-06-03 PROCEDURE — 82948 REAGENT STRIP/BLOOD GLUCOSE: CPT

## 2023-06-03 PROCEDURE — 99232 SBSQ HOSP IP/OBS MODERATE 35: CPT | Performed by: INTERNAL MEDICINE

## 2023-06-03 PROCEDURE — 80048 BASIC METABOLIC PNL TOTAL CA: CPT | Performed by: FAMILY MEDICINE

## 2023-06-03 PROCEDURE — 99222 1ST HOSP IP/OBS MODERATE 55: CPT | Performed by: INTERNAL MEDICINE

## 2023-06-03 PROCEDURE — 99215 OFFICE O/P EST HI 40 MIN: CPT | Performed by: INTERNAL MEDICINE

## 2023-06-03 PROCEDURE — 85027 COMPLETE CBC AUTOMATED: CPT | Performed by: FAMILY MEDICINE

## 2023-06-03 RX ADMIN — HEPARIN SODIUM 5000 UNITS: 5000 INJECTION INTRAVENOUS; SUBCUTANEOUS at 14:02

## 2023-06-03 RX ADMIN — ASPIRIN 81 MG: 81 TABLET, COATED ORAL at 08:48

## 2023-06-03 RX ADMIN — PRASUGREL 5 MG: 10 TABLET, FILM COATED ORAL at 08:48

## 2023-06-03 RX ADMIN — DIVALPROEX SODIUM 250 MG: 125 CAPSULE ORAL at 20:53

## 2023-06-03 RX ADMIN — HEPARIN SODIUM 5000 UNITS: 5000 INJECTION INTRAVENOUS; SUBCUTANEOUS at 05:58

## 2023-06-03 RX ADMIN — DIVALPROEX SODIUM 250 MG: 125 CAPSULE ORAL at 08:48

## 2023-06-03 RX ADMIN — ATORVASTATIN CALCIUM 40 MG: 40 TABLET, FILM COATED ORAL at 17:42

## 2023-06-03 RX ADMIN — DOCUSATE SODIUM 100 MG: 100 CAPSULE, LIQUID FILLED ORAL at 08:48

## 2023-06-03 RX ADMIN — SENNOSIDES 8.6 MG: 8.6 TABLET, FILM COATED ORAL at 08:48

## 2023-06-03 RX ADMIN — Medication 1000 UNITS: at 08:48

## 2023-06-03 RX ADMIN — SEVELAMER HYDROCHLORIDE 800 MG: 800 TABLET ORAL at 08:49

## 2023-06-03 RX ADMIN — HEPARIN SODIUM 5000 UNITS: 5000 INJECTION INTRAVENOUS; SUBCUTANEOUS at 21:02

## 2023-06-03 RX ADMIN — METOPROLOL SUCCINATE 50 MG: 50 TABLET, EXTENDED RELEASE ORAL at 20:52

## 2023-06-03 RX ADMIN — ACETAMINOPHEN 650 MG: 325 TABLET ORAL at 05:56

## 2023-06-03 RX ADMIN — SEVELAMER HYDROCHLORIDE 800 MG: 800 TABLET ORAL at 13:20

## 2023-06-03 NOTE — NURSING NOTE
Pt up and OOB c standby assist c RW  Gown and brief changed  Pt watching TV in chair, c alarm in place

## 2023-06-03 NOTE — PROGRESS NOTES
[address] [facility]  Progress Note  Name: Cass Lei  MRN: [id number]  Unit/Bed#: MM5 210-01 I Date of Admission: 6/2/2023   Date of Service: 6/3/2023 I Hospital Day: 0    Assessment/Plan   * Chest pain  Assessment & Plan  · Patient presented with chest pain while at dialysis  · Discussed with cardiology -cardiology plans to observe inpatient for symptoms while on dialysis noted  · Continue aspirin statin beta-blocker Effient  · Cardiology following      Elevated troponin  Assessment & Plan  · Elevated troponins noted  · Cardiology following    Mood disorder ([address] )  Assessment & Plan  · Continue Depakote    Ischemic cardiomyopathy  Assessment & Plan  · Ischemic cardiomyopathy  · 2D echo EF 35%  · Continue metoprolol  · Entresto reduced to once every day dosing per cardiology  · Cardiology following      CAD, multiple vessel  Assessment & Plan  · History of coronary disease with multivessel involvement status post stent placements  · Continue aspirin, atorvastatin, metoprolol, Effient  · Cardiology notes noted      Primary hypertension  Assessment & Plan  · Monitor blood pressures  · Entresto reduced to once every day dosing  · Cardiology recommends midodrine with dialysis to avoid hypotension per cardiology however patient and family reluctant at this time  · Avoid hypotension    ESRD on dialysis [facility])  Assessment & Plan  Lab Results   Component Value Date    CREATININE 6 16 (H) 06/03/2023    CREATININE 4 61 (H) 06/02/2023    CREATININE 3 94 (H) 03/27/2023    EGFR 8 06/03/2023    EGFR 12 06/02/2023    EGFR 15 03/27/2023     Dialysis per nephrology  Nephrology inputs noted    Type 2 diabetes mellitus with chronic kidney disease on chronic dialysis, without long-term current use of insulin [facility])  Assessment & Plan  Lab Results   Component Value Date    HGBA1C 4 9 04/18/2023       Recent Labs     06/02/23  2125 06/03/23  0639 06/03/23  1059   POCGLU 172* 91 104       Blood Sugar Average: Last 72 hrs:  (P) 160 [id number]     Monitor Accu-Cheks  Avoid hypoglycemia                 VTE Pharmacologic Prophylaxis: VTE Score: 3 Moderate Risk (Score 3-4) - Pharmacological DVT Prophylaxis Ordered: heparin  Patient Centered Rounds: I performed bedside rounds with nursing staff today  Discussions with Specialists or Other Care Team Provider: Cardiology    Education and Discussions with Family / Patient: Discussed with the patient, spouse at bedside  Discussed in detail questions answered  Total Time Spent on Date of Encounter in care of patient: 35 minutes This time was spent on one or more of the following: performing physical exam; counseling and coordination of care; obtaining or reviewing history; documenting in the medical record; reviewing/ordering tests, medications or procedures; communicating with other healthcare professionals and discussing with patient's family/caregivers  Current Length of Stay: 0 day(s)  Current Patient Status: Inpatient   Certification Statement: The patient, admitted on an observation basis, will now require > 2 midnight hospital stay due to Cardiology plans to monitor inpatient dialysis and observe for symptoms noted  Discharge Plan: Cardiology plans to monitor patient for his symptoms at dialysis seen patient noted awaiting cardiology inputs for disposition planning    Code Status: Level 1 - Full Code    Subjective:     Comfortably in bed  Reports feeling okay  Denies chest pain shortness of breath  History chart labs medications reviewed  Spouse at bedside    Objective:     Vitals:   Temp (24hrs), Av 1 °F (36 7 °C), Min:97 7 °F (36 5 °C), Max:98 3 °F (36 8 °C)    Temp:  [97 7 °F (36 5 °C)-98 3 °F (36 8 °C)] 98 3 °F (36 8 °C)  HR:  [67-88] 67  Resp:  [18-20] 20  BP: ()/(53-85) 110/85  SpO2:  [94 %-98 %] 98 %  There is no height or weight on file to calculate BMI  Input and Output Summary (last 24 hours):      Intake/Output Summary (Last 24 hours) at 6/3/2023 1505  Last data filed at 6/3/2023 1233  Gross per 24 hour   Intake 960 ml   Output 50 ml   Net 910 ml       Physical Exam:   Physical Exam       Comfortably in bed  Obese  Neck supple  Lungs diminished breath sounds bilaterally  Heart sounds S1 and S2 noted  Abdomen soft nontender  Awake and alert obey simple commands  No rash    Additional Data:     Labs:  Results from last 7 days   Lab Units 06/03/23  0519 06/02/23  2126 06/02/23  1538   EOS PCT %  --   --  2   HEMATOCRIT % 34 0*  --  33 2*   HEMOGLOBIN g/dL 10 2*  --  10 7*   LYMPHS PCT %  --   --  24   MONOS PCT %  --   --  12   NEUTROS PCT %  --   --  61   PLATELETS Thousands/uL 103*   < > 96*   WBC Thousand/uL 4 76  --  3 86*    < > = values in this interval not displayed       Results from last 7 days   Lab Units 06/03/23  0519 06/02/23  1538   ANION GAP mmol/L 3* 2*   ALBUMIN g/dL  --  3 3*   ALK PHOS U/L  --  80   ALT U/L  --  13   AST U/L  --  10   BUN mg/dL 40* 33*   CALCIUM mg/dL 8 5 8 8   CHLORIDE mmol/L 102 99   CO2 mmol/L 30 32   CREATININE mg/dL 6 16* 4 61*   GLUCOSE RANDOM mg/dL 90 161*   POTASSIUM mmol/L 4 2 3 6   SODIUM mmol/L 135 133*   TOTAL BILIRUBIN mg/dL  --  0 67         Results from last 7 days   Lab Units 06/03/23  1059 06/03/23  0639 06/02/23 2125   POC GLUCOSE mg/dl 104 91 172*               Lines/Drains:  Invasive Devices     Peripheral Intravenous Line  Duration           Peripheral IV 06/02/23 Right Antecubital <1 day          Line  Duration           Hemodialysis AV Fistula Left Upper arm -- days                  Telemetry:  Telemetry Orders (From admission, onward)             24 Hour Telemetry Monitoring  Continuous x 24 Hours (Telem)        Question:  Reason for 24 Hour Telemetry  Answer:  PCI/EP study (including pacer and ICD implementation), Cardiac surgery, MI, abnormal cardiac cath, and chest pain- rule out MI                 Telemetry Reviewed: Sinus rhythm  Indication for Continued Telemetry Use: Acute MI/Unstable Angina/Rule out ACS             Imaging: Reviewed radiology reports from this admission including: chest xray and ECHO    Recent Cultures (last 7 days):         Last 24 Hours Medication List:   Current Facility-Administered Medications   Medication Dose Route Frequency Provider Last Rate   • acetaminophen  650 mg Oral Q6H PRN Jarad Damon MD     • aspirin  81 mg Oral Daily Jarad Damon MD     • atorvastatin  40 mg Oral Daily With Remy Dixon MD     • cholecalciferol  1,000 Units Oral Daily Jarad Damon MD     • divalproex sodium  250 mg Oral Q12H [address] Jarad Daomn MD     • docusate sodium  100 mg Oral BID Jarad Damon MD     • heparin (porcine)  5,000 Units Subcutaneous [facility] Jarad Damon MD     • insulin lispro  1-5 Units Subcutaneous TID [facility] Jarad Damon MD     • insulin lispro  1-5 Units Subcutaneous HS Jarad Damon MD     • metoprolol succinate  50 mg Oral BID Jarad Damon MD     • ondansetron  4 mg Intravenous Q6H PRN Jarad Damon MD     • prasugrel  5 mg Oral Daily Jarad Damon MD     • [START ON 6/4/2023] sacubitril-valsartan  1 tablet Oral Daily Smitha Tee MD     • senna  1 tablet Oral Daily Jarad Damon MD     • sevelamer  800 mg Oral TID With Tiny Mosley MD          Today, Patient Was Seen By: Jeffrey Esteban MD    **Please Note: This note may have been constructed using a voice recognition system  **

## 2023-06-03 NOTE — CONSULTS
Consultation - Nephrology   Della Hartman 61 y o  male MRN: [id number]  Unit/Bed#: CW2 210-01 Encounter: [id number]      Assessment/Plan:  1  End-stage renal disease on maintenance hemodialysis currently Monday Wednesday Friday, [facility]  2  Recurrent chest pain, management as per primary service and cardiology  3  Ischemic cardiomyopathy with a previous ejection fraction of 35%, estimated dry weight 95 kg  4  Multivessel coronary disease with recent drug-eluting stent to the mid LAD  5  Anemia of chronic kidney disease hemoglobin currently appears stable at 10 2  6  CKD associate mineral bone disorder, continue with Renagel and Sensipar 30 mg 3 times weekly  7  Labile blood pressures, monitor closely with Entresto  8  Access currently with left upper arm AV fistula  Plan:  · We will continue with maintenance hemodialysis currently Monday Wednesday Friday  · Blood pressure and volume status currently appears stable  · Cardiology consultation currently pending, continued on Entresto twice daily  · No changes from nephrology standpoint      History of Present Illness   Physician Requesting Consult: Alex Rosa, *  Reason for Consult / Principal Problem: End-stage renal disease  HPI: Della Hartman is a 61y o  year old male who presents with chest pain    Patient is a 78-year-old male with a past medical history significant for multivessel coronary disease, end-stage renal disease on maintenance hemodialysis Monday Wednesday Friday, cardiomyopathy ejection fraction of 35%, diabetes, hypertension who presents with chest pain during hemodialysis  Chest pain lasted for approximately 30 minutes  He describes that this pain was different than his previous episodes  No reports of shortness of breath  No reported radiation  No abdominal pain  No reports of nausea vomiting diarrhea  Denies any current dizziness or lightheadedness    History obtained from chart review and the patient    Review of Systems   Constitutional: Negative for appetite change and fatigue  Respiratory: Positive for chest tightness  Negative for shortness of breath  Cardiovascular: Positive for chest pain  Gastrointestinal: Negative for abdominal pain, diarrhea, nausea and vomiting  Neurological: Negative for dizziness, syncope and light-headedness         Pertinent findings of a 10 point review of systems noted above otherwise all others negative    Historical Information   Patient Active Problem List   Diagnosis   • Type 2 diabetes mellitus with chronic kidney disease on chronic dialysis, without long-term current use of insulin ([facility])   • Dizziness   • Mixed hyperlipidemia   • Nephrotic range proteinuria   • Elevated alkaline phosphatase level   • Diabetic macular edema ([facility])   • GERD (gastroesophageal reflux disease)   • Other constipation   • Abnormal EEG   • History of stroke   • Anxiety associated with depression   • Urge incontinence   • Overactive bladder   • S/P arteriovenous (AV) fistula creation   • Pre-kidney transplant, listed   • Anemia   • History of 2019 novel coronavirus disease (COVID-19)   • ESRD on dialysis ([facility] )   • Penetrating foot wound, left, initial encounter   • Emotional lability   • Primary hypertension   • Generalized weakness   • Chest pain   • Electrolyte abnormality   • CAD, multiple vessel   • SOB (shortness of breath)   • Left arm swelling   • Pre-syncope   • Type 1 non-ST elevation myocardial infarction (NSTEMI) s/p ADE to in-stent restenosis of mid LAD   • Ischemic cardiomyopathy   • Moderate AS (aortic stenosis)   • Mood disorder ([facility] [address] )   • Diabetic polyneuropathy associated with type 2 diabetes mellitus ([facility] )   • Absence of toe of right foot ([facility])   • Hammer toes of both feet   • Elevated troponin   • Presence of external cardiac defibrillator   • Ulcer of left heel, limited to breakdown of skin ([facility] )     Past Medical History:   Diagnosis Date   • Cerebrovascular accident (CVA) due to "thrombosis of left middle cerebral artery ([facility] ) 07/29/2018   • Chronic kidney disease    • Diabetes mellitus ([facility] )    • Dialysis patient [facility])     M-W-F   • GERD (gastroesophageal reflux disease)    • Hypercholesteremia    • Hyperlipidemia    • Hypertension    • 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   • Neuropathy    • Obesity    • Osteomyelitis ([facility] )     last assessed 11/4/16   • PVC's (premature ventricular contractions)     sees cardiology Dr Fabiano camargo   • Stroke [facility])     last weeof July 2018 [address] [facility]   • TIA (transient ischemic attack) 10/28/2018     Past Surgical History:   Procedure Laterality Date   • ABDOMINAL SURGERY     • CARDIAC CATHETERIZATION N/A 5/2/2022    Procedure: Cardiac Coronary Angiogram;  Surgeon: Rena Coronado MD;  [facility]; Service: Cardiology   • CARDIAC CATHETERIZATION N/A 5/2/2022    Procedure: Cardiac pci;  Surgeon: Rena Coronado MD;  [facility]; Service: Cardiology   • CARDIAC CATHETERIZATION  2/1/2023    Procedure: Cardiac catheterization;  Surgeon: Rena Coronado MD;  [facility] CARDIAC [facility]; Service: Cardiology   • CARDIAC CATHETERIZATION N/A 2/1/2023    Procedure: Cardiac pci;  Surgeon: Rena Coronado MD;  [facility]: [facility] CARD[facility]; Service: Cardiology   • CARDIAC CATHETERIZATION N/A 2/1/2023    Procedure: Cardiac Coronary Angiogram;  Surgeon: Rena Coronado MD;  Location: [facility] CARDIAC [facility]ATH [facility]; Service: Cardiology   • CARDIAC CATHETERIZATION N/A 2/1/2023    Procedure: Cardiac other-IVUS;  Surgeon: Rena Coronado MD;  Location: [facility] CARDIAC [facility]ATH [facility]; Service: Cardiology   • CHOLECYSTECTOMY      Percutaneous   • COLONOSCOPY     • CYSTOSCOPY     • OTHER SURGICAL HISTORY      \"stimulator to control bowel movements\"   • AK ESOPHAGOGASTRODUODENOSCOPY TRANSORAL DIAGNOSTIC N/A 9/27/2016    Procedure: ESOPHAGOGASTRODUODENOSCOPY (EGD); Surgeon: Nigel Melendez MD;  Location: [facility] GI LAB;   Service: Gastroenterology   • AK " LAPAROSCOPY SURG CHOLECYSTECTOMY N/A 2/29/2016    Procedure: LAPAROSCOPIC CHOLECYSTECTOMY ;  Surgeon: Rosemary Champagne DO;  Location: [facility] Main OR;  Service: General   • ROTATOR CUFF REPAIR Right    • TOE AMPUTATION Right 10/28/2016    Procedure: 3RD TOE AMPUTATION ;  Surgeon: Shelia Andrew DPM;  Location: [facility] Main OR;  Service:      Social History   Social History     Substance and Sexual Activity   Alcohol Use Not Currently     Social History     Substance and Sexual Activity   Drug Use No     Social History     Tobacco Use   Smoking Status Never   Smokeless Tobacco Never     Family History   Problem Relation Age of Onset   • Leukemia Mother    • Liver disease Mother    • Lung cancer Mother         heavy smoker - 3 ppd   • Heart disease Father    • Liver disease Father    • Diabetes type I Father    • Multiple myeloma Sister    • Breast cancer Sister    • Urolithiasis Family    • Alcohol abuse Neg Hx    • Depression Neg Hx    • Drug abuse Neg Hx    • Substance Abuse Neg Hx    • Mental illness Neg Hx        Meds/Allergies   current meds:   Current Facility-Administered Medications   Medication Dose Route Frequency   • acetaminophen (TYLENOL) tablet 650 mg  650 mg Oral Q6H PRN   • aspirin (ECOTRIN LOW STRENGTH) EC tablet 81 mg  81 mg Oral Daily   • atorvastatin (LIPITOR) tablet 40 mg  40 mg Oral Daily With Dinner   • cholecalciferol (VITAMIN D3) tablet 1,000 Units  1,000 Units Oral Daily   • divalproex sodium (DEPAKOTE SPRINKLE) capsule 250 mg  250 mg Oral Q12H [facility]   • docusate sodium (COLACE) capsule 100 mg  100 mg Oral BID   • heparin (porcine) subcutaneous injection 5,000 Units  5,000 Units Subcutaneous Q8H [facility]   • insulin lispro (HumaLOG) 100 units/mL subcutaneous injection 1-5 Units  1-5 Units Subcutaneous TID AC   • insulin lispro (HumaLOG) 100 units/mL subcutaneous injection 1-5 Units  1-5 Units Subcutaneous HS   • metoprolol succinate (TOPROL-XL) 24 hr tablet 50 mg  50 mg Oral BID   • ondansetron ([place]) injection 4 mg  4 mg Intravenous Q6H PRN   • prasugrel (EFFIENT) tablet 5 mg  5 mg Oral Daily   • sacubitril-valsartan (ENTRESTO) 24-26 MG per tablet 1 tablet  1 tablet Oral BID   • senna (SENOKOT) tablet 8 6 mg  1 tablet Oral Daily   • sevelamer (RENAGEL) tablet 800 mg  800 mg Oral TID With Meals       No Known Allergies      Objective   /53   Pulse 67   Temp 98 2 °F (36 8 °C)   Resp 20   SpO2 96%     Intake/Output Summary (Last 24 hours) at 6/3/2023 0848  Last data filed at 6/3/2023 0650  Gross per 24 hour   Intake 360 ml   Output 50 ml   Net 310 ml       Current Weight:      Physical Exam  Constitutional:       Appearance: He is not ill-appearing  Eyes:      General: No scleral icterus  Cardiovascular:      Rate and Rhythm: Normal rate and regular rhythm  Pulmonary:      Effort: Pulmonary effort is normal       Breath sounds: Normal breath sounds  Abdominal:      General: There is no distension  Palpations: Abdomen is soft  Tenderness: There is no abdominal tenderness  Musculoskeletal:         General: No deformity  Right lower leg: No edema  Left lower leg: No edema  Comments: Left upper arm AV access with positive bruit and thrill  Lymphadenopathy:      Cervical: No cervical adenopathy  Skin:     General: Skin is warm and dry  Coloration: Skin is not jaundiced  Findings: No rash  Neurological:      Mental Status: He is alert and oriented to person, place, and time             Lab Results:    Results from last 7 days   Lab Units 06/03/23  0519   HEMATOCRIT % 34 0*   HEMOGLOBIN g/dL 10 2*   PLATELETS Thousands/uL 103*   WBC Thousand/uL 4 76     Results from last 7 days   Lab Units 06/03/23  0519   BUN mg/dL 40*   CALCIUM mg/dL 8 5   CHLORIDE mmol/L 102   CO2 mmol/L 30   CREATININE mg/dL 6 16*   POTASSIUM mmol/L 4 2

## 2023-06-03 NOTE — ASSESSMENT & PLAN NOTE
· History of coronary disease with multivessel involvement status post stent placements  · Continue aspirin, atorvastatin, metoprolol, Effient  · Cardiology notes noted

## 2023-06-03 NOTE — ASSESSMENT & PLAN NOTE
Lab Results   Component Value Date    HGBA1C 4 9 04/18/2023       Recent Labs     06/02/23  2125 06/03/23  0639 06/03/23  1059   POCGLU 172* 91 104       Blood Sugar Average: Last 72 hrs:  (P) 270 [id number]     Monitor Accu-Cheks  Avoid hypoglycemia

## 2023-06-03 NOTE — ASSESSMENT & PLAN NOTE
· Patient presented with chest pain while at dialysis  · Discussed with cardiology -cardiology plans to observe inpatient for symptoms while on dialysis noted  · Continue aspirin statin beta-blocker Effient  · Cardiology following

## 2023-06-03 NOTE — ASSESSMENT & PLAN NOTE
Lab Results   Component Value Date    CREATININE 6 16 (H) 06/03/2023    CREATININE 4 61 (H) 06/02/2023    CREATININE 3 94 (H) 03/27/2023    EGFR 8 06/03/2023    EGFR 12 06/02/2023    EGFR 15 03/27/2023     Dialysis per nephrology  Nephrology inputs noted

## 2023-06-03 NOTE — ASSESSMENT & PLAN NOTE
· Monitor blood pressures  · Entresto reduced to once every day dosing  · Cardiology recommends midodrine with dialysis to avoid hypotension per cardiology however patient and family reluctant at this time  · Avoid hypotension

## 2023-06-03 NOTE — ASSESSMENT & PLAN NOTE
· Ischemic cardiomyopathy  · 2D echo EF 35%  · Continue metoprolol  · Entresto reduced to once every day dosing per cardiology  · Cardiology following

## 2023-06-03 NOTE — CONSULTS
Consultation - Cardiology   Reji Wiseman 61 y o  male MRN: [id number]  Unit/Bed#: [facility] 210-01 Encounter: [id number]    Inpatient consult to Cardiology  Consult performed by: Festus Marie MD  Consult ordered by: Sadia Matta MD        History of Present Illness   Physician Requesting Consult: Tracy Lewis, *  Reason for Consult / Principal Problem: chest pain, hypotension      Assessment:  Principal Problem:    Chest pain  Active Problems:    Type 2 diabetes mellitus with chronic kidney disease on chronic dialysis, without long-term current use of insulin ([facility] )    ESRD on dialysis [facility])    Primary hypertension    CAD, multiple vessel    Ischemic cardiomyopathy    Mood disorder ([place] [address] )    Elevated troponin      Assessment/ Plan:    Chest pain with likely type 2 MI  History of CAD s/p multiple PCIs with in stent restenosis in the setting of medication non compliance (see below)  Pain resolved en route to [facility]  EKG with ST depressions in the infero-lateral leads  tropoinin [phone]  Prior chest pain during HD in March 2023  Current chest pain was likely demand ischemia secondary to decreased intravascular volume during HD with a background of pre existing obstructive CAD    Coronary artery disease   As outlined below with multiple PCIs, in-stent thrombosis (June 2022) and restenosis (August 2022 and Feb 2023), mostly in the setting of DAPT non compliance  Of note, patient is currently on prasugrel despite history of stroke  Prior stent restenosis on brilinta, however per patients son patient was non compliant  Hypotension  During HD sessions   Prior cards notes mention that BP has been a limiting factor for medication up titration    Non ischemic cardiomyopathy  EF in April 35%  Volume management through HD  BB: Metoprolol succinate 50 mg twice daily  ARNI: Entresto 24-26 recently started  MRA: none   Has a lifevest  Was evaluated by electrophysiology    Currently monitoring for LV function recovery with optimal GDMT  Blood pressure has been a limiting factor for addition and up titration of GDMT    ESRD  On hemodialysis Mondays, Wednesdays, fridays  Currently being evaluated for renal transplant at [facility]    DM 2  History of CVA    Plan  1  Chest pain was likely type 2 MI from reduced intravascular volume during HD with a background of pre existing obstructing CAD  No further inpatient work up necessary  2  Will decrease entresto to daily dosing  3  Advised on medication compliance for prevention of in stent re stenosis  4  Recommended addition of prn midodrine 5 mg prior to his HD session to avoid hypotension and early termination of HD, however patients family would like to wait for now  5  Recommend observing his symptoms and BP during next HD session in patient  6  Continue aspirin + prasugrel + statin  7  Patient is on prasugrel for possible plavix and brilinta failure (stent restenosis?) however his prior in-stent stenosis was during medication non compliance  Given history of CVA, patient may benefit from transitioning back to aspirin and brilinta  Will hold off for now      HPI: Reji Wiseman is a 61y o  year old male who has a history of multivessel CAD s/p multiple PCIs since May 2022, in-stent restenosis with plavix non failure, and later non compliance to brilinta and efient, ischemic cardiomyopathy, HFrEF 35%, PVCs, RBBB, ESRD on dialysis undergoing [facility] evaluation [facility], DM2, HTN, HLD, L MCA thrombosis in 2018, GERD  He presented with chest pain that occurred during his HD session yesterday  He was noted to have hypotension during the session, with pre mature termination of HD session  His pain resolved after improvement of his BP  He denies dyspnea, orthopnea, PND  No palpitations or dizziness  He was started on entresto 24-26 bid on  5/30 which he tolerated well  Yesterday patient did not take this prior to his HD session   On presentation to the ED, he had stable VS, BP was 130/72, O2 sats 97% on room air  EKG: ST depression in inferolateral prominent from prior, RBBB (chronic)  Troponins: [phone]  CXR: increased v markings R>L  His prior cardiac history includes coronary artery disease with multiple interventions in the past  He had a cardiac catheterization in May 2022 with 95% OM1 disease s/p ADE  There was non critical disease in the mid LAD, mid LCX, distal RCA, and a 90% stenosis of RPAV, which were being managed medically  He was on aspirin and plavix  He had a second opinion from [facility], [address] [place] [address]  Had a [address] in June 2022 and underwent ADE x 2 to p-mLAD (70% and 80%), mLCX (70%) with plan to staged PCI of the RCA  He was continued on aspirin and plavix  2 weeks later during the staged PCI, he was noted to have non-occlusive thrombus of the p-m LAD stent s p thrombectomy  RCA was not intervened on  He was transitioned to aspirin and brilinta, however per patients son, he was not compliant with the DAPT  His staged PCI to RCA was rescheduled for 8/2022, and during this, there was restenosis of p-m LAD s/p balloon angioplasty with 20% residual stenosis  He was switched to aspirin and prasugrel  His last LHC was on 2/1/2023 at [facility] which showed marked progression of disease  There was 100% OM1, 100% RPAV, 80% mid LAD, 60% PRDA,  Lesions, s/p ADE to LAD, unable to pass with guide wire through the occluded OM branch  He had a recent hospital admission in March 2023 with chest pain during dialysis  Given presence of known residual disease, this was thought to be angina secondary to reduced preload during dialysis  In addition he also had a component of musculoskeletal pain after a fall  Meds: aspirin, statin, metoprolol, effient, entresto         Review of Systems   Constitutional: Negative  HENT: Negative  Eyes: Negative  Cardiovascular: Positive for chest pain (now resolved)   Negative for dyspnea on exertion, leg swelling, orthopnea, palpitations and paroxysmal nocturnal dyspnea  Respiratory: Negative  Endocrine: Negative  Musculoskeletal: Negative  Neurological: Negative  Psychiatric/Behavioral: Negative  Allergic/Immunologic: Negative  All other systems reviewed and are negative  Historical Information   Past Medical History:   Diagnosis Date   • Cerebrovascular accident (CVA) due to thrombosis of left middle cerebral artery ([facility] ) 07/29/2018   • Chronic kidney disease    • Diabetes mellitus ([place] [address] )    • Dialysis patient [facility])     M-W-F   • GERD (gastroesophageal reflux disease)    • Hypercholesteremia    • Hyperlipidemia    • Hypertension    • 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   • Neuropathy    • Obesity    • Osteomyelitis ([place] [address] )     last assessed 11/4/16   • PVC's (premature ventricular contractions)     sees cardiology Dr Walter camargo   • Stroke [facility])     last weeof July 2018 [address] [facility]   • TIA (transient ischemic attack) 10/28/2018     Past Surgical History:   Procedure Laterality Date   • ABDOMINAL SURGERY     • CARDIAC CATHETERIZATION N/A 5/2/2022    Procedure: Cardiac Coronary Angiogram;  Surgeon: Ranjeet Isaac MD;  Location: [facility] CARDIAC CATH LAB; Service: Cardiology   • CARDIAC CATHETERIZATION N/A 5/2/2022    Procedure: Cardiac pci;  Surgeon: Ranjeet Isaac MD;  Location: [facility] CARDIAC CATH LAB; Service: Cardiology   • CARDIAC CATHETERIZATION  2/1/2023    Procedure: Cardiac catheterization;  Surgeon: Ranjeet Isaac MD;  Location: [facility] CARDIAC CATH LAB; Service: Cardiology   • CARDIAC CATHETERIZATION N/A 2/1/2023    Procedure: Cardiac pci;  Surgeon: Ranjeet Isaac MD;  Location: [facility] CARDIAC CATH LAB; Service: Cardiology   • CARDIAC CATHETERIZATION N/A 2/1/2023    Procedure: Cardiac Coronary Angiogram;  Surgeon: Ranjeet Isaac MD;  Location: [facility] CARDIAC CATH LAB;   Service: Cardiology   • CARDIAC CATHETERIZATION N/A 2/1/2023    Procedure: Cardiac other-IVUS;  Surgeon: Ranjeet Isaac MD; "Location: [facility] CARDIAC CATH LAB; Service: Cardiology   • CHOLECYSTECTOMY      Percutaneous   • COLONOSCOPY     • CYSTOSCOPY     • OTHER SURGICAL HISTORY      \"stimulator to control bowel movements\"   • ID ESOPHAGOGASTRODUODENOSCOPY TRANSORAL DIAGNOSTIC N/A 9/27/2016    Procedure: ESOPHAGOGASTRODUODENOSCOPY (EGD); Surgeon: Alejandra Johnson MD;  Location: [facility] GI LAB;   Service: Gastroenterology   • ID LAPAROSCOPY SURG CHOLECYSTECTOMY N/A 2/29/2016    Procedure: LAPAROSCOPIC CHOLECYSTECTOMY ;  Surgeon: Noé Kapoor DO;  Location: [facility] Main OR;  Service: General   • ROTATOR CUFF REPAIR Right    • TOE AMPUTATION Right 10/28/2016    Procedure: 3RD TOE AMPUTATION ;  Surgeon: Emilia Kinsey DPM;  Location: [facility] Main OR;  Service:      Social History     Substance and Sexual Activity   Alcohol Use Not Currently     Social History     Substance and Sexual Activity   Drug Use No     Social History     Tobacco Use   Smoking Status Never   Smokeless Tobacco Never     Family History:   Family History   Problem Relation Age of Onset   • Leukemia Mother    • Liver disease Mother    • Lung cancer Mother         heavy smoker - 3 ppd   • Heart disease Father    • Liver disease Father    • Diabetes type I Father    • Multiple myeloma Sister    • Breast cancer Sister    • Urolithiasis Family    • Alcohol abuse Neg Hx    • Depression Neg Hx    • Drug abuse Neg Hx    • Substance Abuse Neg Hx    • Mental illness Neg Hx        Meds/Allergies   all current active meds have been reviewed, current meds:   Current Facility-Administered Medications   Medication Dose Route Frequency   • acetaminophen (TYLENOL) tablet 650 mg  650 mg Oral Q6H PRN   • aspirin (ECOTRIN LOW STRENGTH) EC tablet 81 mg  81 mg Oral Daily   • atorvastatin (LIPITOR) tablet 40 mg  40 mg Oral Daily With Dinner   • cholecalciferol (VITAMIN D3) tablet 1,000 Units  1,000 Units Oral Daily   • divalproex sodium (DEPAKOTE SPRINKLE) capsule 250 mg  250 mg Oral Q12H [address]   • docusate " sodium (COLACE) capsule 100 mg  100 mg Oral BID   • heparin (porcine) subcutaneous injection 5,000 Units  5,000 Units Subcutaneous Q8H [address]   • insulin lispro (HumaLOG) 100 units/mL subcutaneous injection 1-5 Units  1-5 Units Subcutaneous TID AC   • insulin lispro (HumaLOG) 100 units/mL subcutaneous injection 1-5 Units  1-5 Units Subcutaneous HS   • metoprolol succinate (TOPROL-XL) 24 hr tablet 50 mg  50 mg Oral BID   • ondansetron (ZOFRAN) injection 4 mg  4 mg Intravenous Q6H PRN   • prasugrel (EFFIENT) tablet 5 mg  5 mg Oral Daily   • sacubitril-valsartan (ENTRESTO) 24-26 MG per tablet 1 tablet  1 tablet Oral BID   • senna (SENOKOT) tablet 8 6 mg  1 tablet Oral Daily   • sevelamer (RENAGEL) tablet 800 mg  800 mg Oral TID With Meals    and PTA meds:   Prior to Admission Medications   Prescriptions Last Dose Informant Patient Reported? Taking? Blood Glucose Monitoring Suppl (True Metrix Meter) w/Device KIT  Spouse/Significant Other, Child No No   Sig: Use to test blood sugars 3 times daily   Blood Pressure Monitoring (BLOOD PRESSURE CUFF) MISC  Spouse/Significant Other, Child No No   Sig: Use to check blood pressure before taking blood pressure medication and 1 hour after and follow instructions provided in discharge instructions based on the readings     D3-50 1 25 MG (90064 UT) capsule Past Week Spouse/Significant Other, Child No Yes   Sig: TAKE 1 CAPSULE BY MOUTH ONE TIME PER WEEK   ONEVAHID DELICA LANCETS 34U MISC  Spouse/Significant Other, Child No No   Sig: by Does not apply route 3 (three) times a day   Sevelamer Carbonate 2 4 g PACK 6/2/2023 Spouse/Significant Other, Child Yes Yes   Sig: VIGOROUSLY MIX CONTENTS OF 1 PACKET IN WATER (IT WILL NOT DISSOLVE) AND DRINK 3 TIMES DAILY WITH MEALS   aspirin (ECOTRIN LOW STRENGTH) 81 mg EC tablet 6/2/2023 Spouse/Significant Other, Child Yes Yes   Sig: Take 81 mg by mouth daily Resume on 8/14     atorvastatin (LIPITOR) 40 mg tablet 6/1/2023 Spouse/Significant Other, Child No Yes   Sig: TAKE 1 TABLET BY MOUTH EVERY DAY WITH DINNER   collagenase (SANTYL) ointment 6/1/2023 Child, Spouse/Significant Other No Yes   Sig: Apply topically daily   divalproex sodium (DEPAKOTE SPRINKLE) 125 MG capsule 6/2/2023 Spouse/Significant Other, Child No Yes   Sig: TAKE 2 CAPSULES (250 MG TOTAL) BY MOUTH EVERY 12 (TWELVE) HOURS   glucose blood (True Metrix Blood Glucose Test) test strip  Spouse/Significant Other, Child No No   Sig: Use 1 each 3 (three) times a day Use as instructed   metoprolol succinate (TOPROL-XL) 50 mg 24 hr tablet 6/2/2023 Spouse/Significant Other, Child No Yes   Sig: Take 1 tablet (50 mg total) by mouth 2 (two) times a day   prasugrel (EFFIENT) tablet 6/2/2023 Spouse/Significant Other, Child No Yes   Sig: Take 1 tablet (5 mg total) by mouth daily   sacubitril-valsartan (Entresto) 24-26 MG TABS 6/2/2023  No Yes   Sig: Take 1 tablet by mouth 2 (two) times a day Can hold morning dose on dialysis days  Facility-Administered Medications: None          No Known Allergies    Objective   Vitals: Blood pressure 108/53, pulse 67, temperature 98 2 °F (36 8 °C), resp  rate 20, SpO2 96 %  , There is no height or weight on file to calculate BMI ,   Orthostatic Blood Pressures    Flowsheet Row Most Recent Value   Blood Pressure 108/53 filed at 06/03/2023 0707   Patient Position - Orthostatic VS Sitting filed at 06/02/2023 7796        Systolic (53UZZ), LAB:747 , Min:108 , HPC:724     Diastolic (20JHC), IMD:55, Min:53, Max:72      Intake/Output Summary (Last 24 hours) at 6/3/2023 0832  Last data filed at 6/3/2023 0650  Gross per 24 hour   Intake 360 ml   Output 50 ml   Net 310 ml       Weight (last 2 days)     None          Invasive Devices     Peripheral Intravenous Line  Duration           Peripheral IV 06/02/23 Right Antecubital <1 day          Line  Duration           Hemodialysis AV Fistula Left Upper arm -- days                    Physical Exam: BP 99/56   Pulse 67   Temp 98 2 °F (36 8 °C)   Resp 20   SpO2 96%     General Appearance:    Alert, cooperative, no distress, appears stated age   Head:    Normocephalic, without obvious abnormality, atraumatic   Eyes:    PERRL, conjunctiva/corneas clear, EOM's intact, fundi     benign, both eyes        Ears:    Normal TM's and external ear canals, both ears   Neck:   Supple, symmetrical, trachea midline, no adenopathy;        thyroid:  No enlargement/tenderness/nodules; no carotid    bruit or JVD   Back:     Symmetric, no curvature, ROM normal, no CVA tenderness   Lungs:     B/L rales, scattered   Chest wall:    No tenderness or deformity   Heart:    Regular rate and rhythm, S1 and S2 normal, no murmur, rub   or gallop   Abdomen:     Soft, non-tender, bowel sounds active all four quadrants,     no masses, no organomegaly   Extremities:   Extremities normal, atraumatic, no cyanosis, trace edema   Pulses:   2+ and symmetric all extremities   Skin:   Skin color, texture, turgor normal, no rashes or lesions           Laboratory Results:        CBC with diff:   Results from last 7 days   Lab Units 06/03/23 0519 06/02/23 2126 06/02/23  1538   HEMATOCRIT % 34 0*  --  33 2*   HEMOGLOBIN g/dL 10 2*  --  10 7*   MCH pg 29 4  --  30 1   MCHC g/dL 30 0*  --  32 2   MCV fL 98  --  94   MPV fL 11 4 11 5 11 3   NRBC AUTO /100 WBCs  --   --  0   PLATELETS Thousands/uL 103* 94* 96*   RBC Million/uL 3 47*  --  3 55*   RDW % 15 6*  --  15 4*   WBC Thousand/uL 4 76  --  3 86*         CMP:  Results from last 7 days   Lab Units 06/03/23 0519 06/02/23  1538   ALK PHOS U/L  --  80   ALT U/L  --  13   AST U/L  --  10   BUN mg/dL 40* 33*   CALCIUM mg/dL 8 5 8 8   CHLORIDE mmol/L 102 99   CO2 mmol/L 30 32   CREATININE mg/dL 6 16* 4 61*   EGFR ml/min/1 73sq m 8 12   POTASSIUM mmol/L 4 2 3 6         BMP:  Results from last 7 days   Lab Units 06/03/23 0519 06/02/23  1538   BUN mg/dL 40* 33*   CALCIUM mg/dL 8 5 8 8   CHLORIDE mmol/L 102 99   CO2 mmol/L 30 32   CREATININE "mg/dL 6 16* 4 61*   POTASSIUM mmol/L 4 2 3 6       BNP:  No results for input(s): \"BNP\" in the last 72 hours  Magnesium:       Coags:       TSH:       Hemoglobin A1C       Lipid Profile:         Cardiac testing:   Results for orders placed during the hospital encounter of 10/05/20    Echo complete with contrast if indicated    Narrative  [address] [facility]  [phone]    Transthoracic Echocardiogram  2D, M-mode, Doppler, and Color Doppler    Study date:  06-Oct-2020    Patient: Rk Maxwell  MR number: [id number]  Account number: [de-identified]  : 1960  Age: 61 years  Gender: Male  Status: Inpatient  Location: Bedside  Height: 70 in  Weight: 239 6 lb  BP: 165/ 80 mmHg    Indications: Shortness of breath  Diagnoses: R06 02 - Shortness of breath    Sonographer:  Franca Acosta RDCS  Primary Physician:  Chris Solis DO  Referring Physician:  Hao Cooper MD  Group:  [facility]  Interpreting Physician:  Sigrid Meza MD    SUMMARY    LEFT VENTRICLE:  Systolic function was normal by visual assessment  Ejection fraction was estimated to be 60 %  There were no regional wall motion abnormalities  Wall thickness was moderately increased  Features were consistent with a pseudonormal left ventricular filling pattern, with concomitant abnormal relaxation and increased filling pressure (grade 2 diastolic dysfunction)  LEFT ATRIUM:  The atrium was mildly dilated  RIGHT ATRIUM:  The atrium was mildly dilated  MITRAL VALVE:  There was moderate annular calcification  There was mild regurgitation  AORTIC VALVE:  The valve was trileaflet  Leaflets exhibited normal thickness, moderate calcification, and moderately reduced cuspal separation  Transaortic velocity was increased due to valvular stenosis  There was mild to moderate stenosis  There was mild regurgitation  TRICUSPID VALVE:  There was trace regurgitation      PULMONIC " VALVE:  There was mild regurgitation  HISTORY: PRIOR HISTORY: DM2  CKD4  Hypertension  CVA  GERD  Dementia  PROCEDURE: The procedure was performed at the bedside  This was a routine study  The transthoracic approach was used  The study included complete 2D imaging, M-mode, complete spectral Doppler, and color Doppler  The heart rate was 98 bpm,  at the start of the study  Image quality was adequate  LEFT VENTRICLE: Size was normal  Systolic function was normal by visual assessment  Ejection fraction was estimated to be 60 %  There were no regional wall motion abnormalities  Wall thickness was moderately increased  DOPPLER: The ratio  of early ventricular filling to atrial contraction velocities was within the normal range  Features were consistent with a pseudonormal left ventricular filling pattern, with concomitant abnormal relaxation and increased filling pressure  (grade 2 diastolic dysfunction)  RIGHT VENTRICLE: The size was normal  Systolic function was normal  DOPPLER: Systolic pressure was not estimated  LEFT ATRIUM: The atrium was mildly dilated  RIGHT ATRIUM: The atrium was mildly dilated  MITRAL VALVE: There was moderate annular calcification  Valve structure was normal  There was normal leaflet separation  DOPPLER: The transmitral velocity was within the normal range  There was no evidence for stenosis  There was mild  regurgitation  AORTIC VALVE: The valve was trileaflet  Leaflets exhibited normal thickness, moderate calcification, and moderately reduced cuspal separation  DOPPLER: Transaortic velocity was increased due to valvular stenosis  There was mild to moderate  stenosis  There was mild regurgitation  TRICUSPID VALVE: The valve structure was normal  There was normal leaflet separation  DOPPLER: The transtricuspid velocity was within the normal range  There was no evidence for stenosis  There was trace regurgitation      PULMONIC VALVE: Leaflets exhibited normal thickness, no calcification, and normal cuspal separation  DOPPLER: The transpulmonic velocity was within the normal range  There was mild regurgitation  PERICARDIUM: There was no pericardial effusion  AORTA: The root exhibited normal size  SYSTEMIC VEINS: IVC: The inferior vena cava was normal in size and course  Respirophasic changes were normal     SYSTEM MEASUREMENT TABLES    2D  %FS: 36 09 %  Ao Diam: 3 8 cm  EDV(Teich): 182 77 ml  EF(Teich): 64 8 %  ESV(Teich): 64 33 ml  IVSd: 1 57 cm  LA Area: 24 31 cm2  LA Diam: 4 63 cm  LVEDV MOD A4C: 192 34 ml  LVEF MOD A4C: 65 32 %  LVESV MOD A4C: 66 7 ml  LVIDd: 6 04 cm  LVIDs: 3 86 cm  LVLd A4C: 9 02 cm  LVLs A4C: 7 15 cm  LVOT Diam: 2 21 cm  LVPWd: 1 61 cm  RA Area: 17 56 cm2  RVIDd: 4 cm  SV MOD A4C: 125 64 ml  SV(Teich): 118 44 ml    CW  AV Env  Ti: 330 16 ms  AV MaxP 64 mmHg  AV VTI: 57 9 cm  AV Vmax: 2 58 m/s  AV Vmean: 1 76 m/s  AV meanP 38 mmHg  TR MaxP 37 mmHg  TR Vmax: 2 71 m/s    MM  TAPSE: 1 84 cm    PW  FATUMA (VTI): 1 27 cm2  FATUMA Vmax: 1 42 cm2  AVAI (VTI): 0 cm2/m2  AVAI Vmax: 0 cm2/m2  E' Sept: 0 07 m/s  E/E' Sept: 12 69  LVOT Env  Ti: 335 87 ms  LVOT VTI: 19 11 cm  LVOT Vmax: 0 95 m/s  LVOT Vmean: 0 57 m/s  LVOT maxPG: 3 63 mmHg  LVOT meanP 61 mmHg  LVSI Dopp: 32 42 ml/m2  LVSV Dopp: 73 27 ml  MV A Carlin: 0 64 m/s  MV Dec Coffee: 4 7 m/s2  MV DecT: 189 67 ms  MV E Carlin: 0 89 m/s  MV E/A Ratio: 1 38  MV PHT: 55 01 ms  MVA By PHT: 4 cm2    [address] Accredited Echocardiography Laboratory    Prepared and electronically signed by    Delphina Dakin, MD  Signed 06-Oct-2020 16:31:49    No results found for this or any previous visit  No results found for this or any previous visit  No results found for this or any previous visit  Imaging: I have personally reviewed pertinent reports  XR chest 1 view portable    Result Date: 6/3/2023  Narrative: CHEST INDICATION:   chest pain   COMPARISON: 2023 EXAM PERFORMED/VIEWS:  XR CHEST PORTABLE Images: 2 FINDINGS: Cardiomediastinal silhouette appears enlarged  Coronary artery stent is noted  Airspace disease in the right lung  The left lung is clear  The patient is wearing a life vest  Osseous structures appear within normal limits for patient age  Impression: Airspace disease in the right lung  Correlate for infection versus asymmetric heart failure   Workstation performed: [id number]       EKG reviewed personally: ST depressions in the inferolateral leads, LBBB  Telemetry reviewed personally: NSR, no events          Code Status: Level 1 - Full Code

## 2023-06-04 LAB
GLUCOSE SERPL-MCNC: 136 MG/DL (ref 65–140)
GLUCOSE SERPL-MCNC: 86 MG/DL (ref 65–140)
GLUCOSE SERPL-MCNC: 99 MG/DL (ref 65–140)

## 2023-06-04 PROCEDURE — 99232 SBSQ HOSP IP/OBS MODERATE 35: CPT | Performed by: INTERNAL MEDICINE

## 2023-06-04 PROCEDURE — 82948 REAGENT STRIP/BLOOD GLUCOSE: CPT

## 2023-06-04 PROCEDURE — 99233 SBSQ HOSP IP/OBS HIGH 50: CPT | Performed by: INTERNAL MEDICINE

## 2023-06-04 RX ADMIN — SEVELAMER HYDROCHLORIDE 800 MG: 800 TABLET ORAL at 17:38

## 2023-06-04 RX ADMIN — DIVALPROEX SODIUM 250 MG: 125 CAPSULE ORAL at 20:24

## 2023-06-04 RX ADMIN — ATORVASTATIN CALCIUM 40 MG: 40 TABLET, FILM COATED ORAL at 17:37

## 2023-06-04 RX ADMIN — SENNOSIDES 8.6 MG: 8.6 TABLET, FILM COATED ORAL at 09:45

## 2023-06-04 RX ADMIN — DOCUSATE SODIUM 100 MG: 100 CAPSULE, LIQUID FILLED ORAL at 09:45

## 2023-06-04 RX ADMIN — Medication 1000 UNITS: at 09:45

## 2023-06-04 RX ADMIN — HEPARIN SODIUM 5000 UNITS: 5000 INJECTION INTRAVENOUS; SUBCUTANEOUS at 05:42

## 2023-06-04 RX ADMIN — SEVELAMER HYDROCHLORIDE 800 MG: 800 TABLET ORAL at 11:42

## 2023-06-04 RX ADMIN — METOPROLOL SUCCINATE 50 MG: 50 TABLET, EXTENDED RELEASE ORAL at 09:45

## 2023-06-04 RX ADMIN — ONDANSETRON 4 MG: 2 INJECTION INTRAMUSCULAR; INTRAVENOUS at 23:38

## 2023-06-04 RX ADMIN — METOPROLOL SUCCINATE 50 MG: 50 TABLET, EXTENDED RELEASE ORAL at 20:24

## 2023-06-04 RX ADMIN — DIVALPROEX SODIUM 250 MG: 125 CAPSULE ORAL at 09:47

## 2023-06-04 RX ADMIN — ASPIRIN 81 MG: 81 TABLET, COATED ORAL at 09:45

## 2023-06-04 RX ADMIN — SACUBITRIL AND VALSARTAN 1 TABLET: 24; 26 TABLET, FILM COATED ORAL at 09:47

## 2023-06-04 RX ADMIN — SEVELAMER HYDROCHLORIDE 800 MG: 800 TABLET ORAL at 09:47

## 2023-06-04 RX ADMIN — HEPARIN SODIUM 5000 UNITS: 5000 INJECTION INTRAVENOUS; SUBCUTANEOUS at 13:29

## 2023-06-04 RX ADMIN — PRASUGREL 5 MG: 10 TABLET, FILM COATED ORAL at 09:51

## 2023-06-04 NOTE — ASSESSMENT & PLAN NOTE
· Ischemic cardiomyopathy  · 2D echo EF 35%  · Continue metoprolol  · Entresto reduced to once every day dosing per cardiology  · Cardiology following 25-Nov-2017 23:06

## 2023-06-04 NOTE — ASSESSMENT & PLAN NOTE
Lab Results   Component Value Date    HGBA1C 4 9 04/18/2023       Recent Labs     06/03/23  1610 06/03/23 2051 06/04/23  0545 06/04/23  1136   POCGLU 144* 108 86 136       Blood Sugar Average: Last 72 hrs:  (P) 120 [id number]     Monitor Accu-Cheks  Avoid hypoglycemia

## 2023-06-04 NOTE — ASSESSMENT & PLAN NOTE
· Patient presented with chest pain while at dialysis  · Cardiology plans to observe inpatient for symptoms while on dialysis noted  · Continue aspirin metoprolol atorvastatin, Effient  · Cardiology following

## 2023-06-04 NOTE — ASSESSMENT & PLAN NOTE
Lab Results   Component Value Date    CREATININE 6 16 (H) 06/03/2023    CREATININE 4 61 (H) 06/02/2023    CREATININE 3 94 (H) 03/27/2023    EGFR 8 06/03/2023    EGFR 12 06/02/2023    EGFR 15 03/27/2023     Dialysis per nephrology  Discussed with nephrology

## 2023-06-04 NOTE — PROGRESS NOTES
[address] [facility]  Progress Note  Name: Enriqueta Gonzales  MRN: [id number]  [facility] Date of Admission: 6/2/2023   Date of Service: 6/4/2023  Hospital Day: 1    Assessment/Plan   * Chest pain  Assessment & Plan  · Patient presented with chest pain while at dialysis  · Cardiology plans to observe inpatient for symptoms while on dialysis noted  · Continue aspirin metoprolol atorvastatin, Effient  · Cardiology following      Elevated troponin  Assessment & Plan  · Elevated troponins noted  · Cardiology following    Mood disorder ([address] )  Assessment & Plan  · Continue Depakote    Ischemic cardiomyopathy  Assessment & Plan  · Ischemic cardiomyopathy  · 2D echo EF 35%  · Continue metoprolol  · Entresto reduced to once every day dosing per cardiology  · Cardiology following    CAD, multiple vessel  Assessment & Plan  · History of coronary disease with multivessel involvement status post stent placements  · Continue aspirin metoprolol atorvastatin Effient  · Cardiology following     Primary hypertension  Assessment & Plan  · Blood pressures reviewed  · Acceptable  · Entresto reduced to once every day dosing  · Cardiology recommends midodrine with dialysis to avoid hypotension per cardiology however patient and family reluctant at this time  · Avoid hypotension    ESRD on dialysis [facility])  Assessment & Plan  Lab Results   Component Value Date    CREATININE 6 16 (H) 06/03/2023    CREATININE 4 61 (H) 06/02/2023    CREATININE 3 94 (H) 03/27/2023    EGFR 8 06/03/2023    EGFR 12 06/02/2023    EGFR 15 03/27/2023     Dialysis per nephrology  Discussed with nephrology    Type 2 diabetes mellitus with chronic kidney disease on chronic dialysis, without long-term current use of insulin [facility])  Assessment & Plan  Lab Results   Component Value Date    HGBA1C 4 9 04/18/2023       Recent Labs     06/03/23  1610 06/03/23  2051 06/04/23  0545 06/04/23  1136   POCGLU 144* 108 86 136       Blood Sugar Average: Last 72 hrs:  (P) 120 [id number]     Monitor Accu-Cheks  Avoid hypoglycemia                 VTE Pharmacologic Prophylaxis: VTE Score: 3 Moderate Risk (Score 3-4) - Pharmacological DVT Prophylaxis Ordered: heparin  Patient Centered Rounds: I performed bedside rounds with nursing staff today  Discussions with Specialists or Other Care Team Provider:     Education and Discussions with Family / Patient: Discussed with the patient, updated spouse Oscar Parekh   Total Time Spent on Date of Encounter in care of patient: 30 min This time was spent on one or more of the following: performing physical exam; counseling and coordination of care; obtaining or reviewing history; documenting in the medical record; reviewing/ordering tests, medications or procedures; communicating with other healthcare professionals and discussing with patient's family/caregivers  Current Length of Stay: 1 day(s)  Current Patient Status: Inpatient   Certification Statement: The patient will continue to require additional inpatient hospital stay due to As outlined  Discharge Plan: Cardiology plans to observe on dialysis for symptoms noted    Code Status: Level 1 - Full Code    Subjective:     Comfortably sitting up in chair  Reports feeling okay  Denies chest pain shortness of breath      Objective:     Vitals:   Temp (24hrs), Av 8 °F (36 6 °C), Min:96 8 °F (36 °C), Max:98 3 °F (36 8 °C)    Temp:  [96 8 °F (36 °C)-98 3 °F (36 8 °C)] 97 4 °F (36 3 °C)  HR:  [65-84] 82  Resp:  [17-20] 20  BP: ()/(45-85) 123/75  SpO2:  [90 %-98 %] 95 %  Body mass index is 31 64 kg/m²  Input and Output Summary (last 24 hours):      Intake/Output Summary (Last 24 hours) at 2023 1314  Last data filed at 2023 1308  Gross per 24 hour   Intake 718 ml   Output 450 ml   Net 268 ml       Physical Exam:   Physical Exam       Comfortably sitting up in chair  Neck supple  Mucous membranes are moist  Lungs diminished breath sounds bilaterally  Heart sounds S1 and S2 noted  Abdomen soft nontender  Awake, obey simple commands  No rash  No pedal edema        Additional Data:     Labs:  Results from last 7 days   Lab Units 06/03/23  0519 06/02/23  2126 06/02/23  1538   EOS PCT %  --   --  2   HEMATOCRIT % 34 0*  --  33 2*   HEMOGLOBIN g/dL 10 2*  --  10 7*   LYMPHS PCT %  --   --  24   MONOS PCT %  --   --  12   NEUTROS PCT %  --   --  61   PLATELETS Thousands/uL 103*   < > 96*   WBC Thousand/uL 4 76  --  3 86*    < > = values in this interval not displayed       Results from last 7 days   Lab Units 06/03/23  0519 06/02/23  1538   ANION GAP mmol/L 3* 2*   ALBUMIN g/dL  --  3 3*   ALK PHOS U/L  --  80   ALT U/L  --  13   AST U/L  --  10   BUN mg/dL 40* 33*   CALCIUM mg/dL 8 5 8 8   CHLORIDE mmol/L 102 99   CO2 mmol/L 30 32   CREATININE mg/dL 6 16* 4 61*   GLUCOSE RANDOM mg/dL 90 161*   POTASSIUM mmol/L 4 2 3 6   SODIUM mmol/L 135 133*   TOTAL BILIRUBIN mg/dL  --  0 67         Results from last 7 days   Lab Units 06/04/23  1136 06/04/23  0545 06/03/23  2051 06/03/23  1610 06/03/23  1059 06/03/23  0639 06/02/23  2125   POC GLUCOSE mg/dl 136 86 108 144* 104 91 172*               Lines/Drains:  Invasive Devices     Peripheral Intravenous Line  Duration           Peripheral IV 06/02/23 Right Antecubital 1 day          Line  Duration           Hemodialysis AV Fistula Left Upper arm -- days                  Telemetry:  Telemetry Orders (From admission, onward)             24 Hour Telemetry Monitoring  Continuous x 24 Hours (Telem)        Expiring   Question:  Reason for 24 Hour Telemetry  Answer:  PCI/EP study (including pacer and ICD implementation), Cardiac surgery, MI, abnormal cardiac cath, and chest pain- rule out MI                 Telemetry Reviewed: Sinus rhythm  Indication for Continued Telemetry Use: Acute MI/Unstable Angina/Rule out ACS             Imaging: Reviewed radiology reports from this admission including: chest xray    Recent Cultures (last 7 days):         Last 24 Hours Medication List:   Current Facility-Administered Medications   Medication Dose Route Frequency Provider Last Rate   • acetaminophen  650 mg Oral Q6H PRN Kevin Sarmiento MD     • aspirin  81 mg Oral Daily Kevin Sarmiento MD     • atorvastatin  40 mg Oral Daily With Rolando Melara MD     • cholecalciferol  1,000 Units Oral Daily Kevin Sarmiento MD     • divalproex sodium  250 mg Oral Q12H [facility] Kevin Sarmiento MD     • docusate sodium  100 mg Oral BID Kevin Sarmiento MD     • heparin (porcine)  5,000 Units Subcutaneous [facility] Kevin Sarmiento MD     • insulin lispro  1-5 Units Subcutaneous TID [facility] Kevin Sarmiento MD     • insulin lispro  1-5 Units Subcutaneous HS Kevin Sarmiento MD     • metoprolol succinate  50 mg Oral BID Kevin Sarmiento MD     • ondansetron  4 mg Intravenous Q6H PRN Kevin Sarmiento MD     • prasugrel  5 mg Oral Daily Kevin Sarmiento MD     • sacubitril-valsartan  1 tablet Oral Daily Lenin Cano MD     • senna  1 tablet Oral Daily Kevin Sarmiento MD     • sevelamer  800 mg Oral TID With Jo Hatch MD          Today, Patient Was Seen By: Nabil Ponce MD    **Please Note: This note may have been constructed using a voice recognition system  **

## 2023-06-04 NOTE — ASSESSMENT & PLAN NOTE
· Blood pressures reviewed  · Acceptable  · Entresto reduced to once every day dosing  · Cardiology recommends midodrine with dialysis to avoid hypotension per cardiology however patient and family reluctant at this time  · Avoid hypotension

## 2023-06-04 NOTE — PROGRESS NOTES
Progress Note - Cardiology   Carmen Gonsalves 61 y o  male MRN: [id number]  [facility] Encounter: [id number]  06/04/23  11:33 AM    Impression and Plan:    12-year-old with history of end-stage renal disease-on hemodialysis, moderate aortic stenosis, ischemic cardiomyopathy, hypertension, dyslipidemia, no diabetes, prior history of stroke, coronary disease with multiple interventions involving all 3 vessels since May 2022, including stent thrombosis as well as in-stent restenosis, with the last coronary angiography being at [address] in February 2023 when patient underwent ADE to the LAD 80% in-stent restenosis, had residual 60% right PDA, RCA occluded beyond the PDA and 100% OM1 for which intervention was attempted but unsuccessful     Most recent echo-April 2023 with an ejection fraction of 35%, moderate aortic stenosis      Had seen electrophysiology as well for consideration for a biventricular device but was advised to continue medical management in the hopes that EF would improve further and patient could avoid a device-in the setting of the risk for complications including infection and impaired healing      In an effort to maximize guideline directed medical therapy, was initiated on Entresto at the lowest dose, which the patient took-only 2 doses of and subsequently went for hemodialysis-yesterday, had not taken Entresto and became hypotensive at dialysis with onset of chest pains as well as troponin elevation that has peaked at 500 and trending down with ECG showing inferolateral ST segment depressions      Plan:     Chest pain associated with hypotension, associated with inferolateral ST depressions and mild troponin elevation: Most likely this presentation is from impaired perfusion from hypotension during dialysis  [place] was an appropriate medication-GDMT for cardiomyopathy  Has been recommended to decrease Entresto to once daily which I agree with    Also advised trying midodrine 2 5-5 mg on the morning of dialysis to avoid further hypotension in the long-term -   Borderline blood pressures this morning but will continue to watch  family is willing to try once daily Entresto but would like to hold off on adding midodrine at this time  Patient will be seen by his primary cardiologist-Dr Tanja Bonilla from tomorrow     Coronary artery disease: Multiple interventions, described in detail in fellow's note, most recent coronary angiography was in February 2023-anatomy described above  No further interventions at this time  At baseline he is on dual antiplatelet therapy with aspirin and prasugrel-switched from aspirin and Brilinta due to stent thrombosis although this was in the setting of noncompliance with DAPT with a history of stroke - Would ideally be on aspirin and Brilinta     Cardiomyopathy: At baseline on beta-blocker-metoprolol 50 mg twice daily and going forward will be on Entresto 24/26 once daily  No need for diuretics, euvolemic on exam  Volume removal is very hemodialysis     Dyslipidemia: Last LDL of 21, non-HDL 46-January 2023, on atorvastatin 40 mg, can consider increasing this to 80 mg to maximize medical management      ===================================================================    Chief Complaint:   Chief Complaint   Patient presents with   • Chest Pain     Per EMS patient coming from dialysis where he reported chest pain all morning but refused to come for treatment as he routinely experiences chest pain on dialysis days  Following dialysis pt with BP 83X systolic and advised to come to the ER for further eval  Patient denies chest pain at this time           Subjective/Objective     Subjective: Denies any complaints    Objective: No distress    Patient Active Problem List   Diagnosis   • Type 2 diabetes mellitus with chronic kidney disease on chronic dialysis, without long-term current use of insulin ([facility] [address] )   • Dizziness   • Mixed hyperlipidemia   • Nephrotic range proteinuria   • Elevated alkaline phosphatase level   • Diabetic macular edema (HCC)   • GERD (gastroesophageal reflux disease)   • Other constipation   • Abnormal EEG   • History of stroke   • Anxiety associated with depression   • Urge incontinence   • Overactive bladder   • S/P arteriovenous (AV) fistula creation   • Pre-kidney transplant, listed   • Anemia   • History of 2019 novel coronavirus disease (COVID-19)   • ESRD on dialysis [facility])   • Penetrating foot wound, left, initial encounter   • Emotional lability   • Primary hypertension   • Generalized weakness   • Chest pain   • Electrolyte abnormality   • CAD, multiple vessel   • SOB (shortness of breath)   • Left arm swelling   • Pre-syncope   • Type 1 non-ST elevation myocardial infarction (NSTEMI) s/p ADE to in-stent restenosis of mid LAD   • Ischemic cardiomyopathy   • Moderate AS (aortic stenosis)   • Mood disorder ([facility] [address] )   • Diabetic polyneuropathy associated with type 2 diabetes mellitus (HCC)   • Absence of toe of right foot ([facility])   • Hammer toes of both feet   • Elevated troponin   • Presence of external cardiac defibrillator   • Ulcer of left heel, limited to breakdown of skin ([facility])       Vitals: /75   Pulse 82   Temp (!) 97 4 °F (36 3 °C)   Resp 20   Wt 97 2 kg (214 lb 4 6 oz)   SpO2 95%   BMI 31 64 kg/m²     I/O this shift:  In: 360 [P O :360]  Out: -   Wt Readings from Last 3 Encounters:   06/04/23 97 2 kg (214 lb 4 6 oz)   05/30/23 96 2 kg (212 lb)   05/25/23 96 2 kg (212 lb)       Intake/Output Summary (Last 24 hours) at 6/4/2023 1133  Last data filed at 6/4/2023 0747  Gross per 24 hour   Intake 840 ml   Output 350 ml   Net 490 ml     I/O last 3 completed shifts:   In: 1200 [P O :1200]  Out: 400 [Urine:400]    Invasive Devices     Peripheral Intravenous Line  Duration           Peripheral IV 06/02/23 Right Antecubital 1 day          Line  Duration           Hemodialysis AV Fistula Left Upper arm -- days                  Physical Exam:  GEN: Carmen Gonsalves appears okay, alert and oriented x 3, pleasant and cooperative   HEENT: pupils equal, round, and reactive to light; extraocular muscles intact  NECK: supple, no carotid bruits or JVD  HEART: regular rhythm, normal S1 and S2, no murmur, no clicks, gallops or rubs   LUNGS: clear to auscultation bilaterally; no wheezes or rhonchi, no rales  ABDOMEN/GI: normal bowel sounds, soft, no tenderness, no distention  EXTREMITIES/Musculoskeltal: peripheral pulses normal; no clubbing, cyanosis, no edema  NEURO: no focal motor findings   SKIN: normal without suspicious lesions on exposed skin              Lab Results:       Results from last 7 days   Lab Units 06/03/23  0519 06/02/23 2126 06/02/23  1538   HEMATOCRIT % 34 0*  --  33 2*   HEMOGLOBIN g/dL 10 2*  --  10 7*   PLATELETS Thousands/uL 103* 94* 96*   WBC Thousand/uL 4 76  --  3 86*         Results from last 7 days   Lab Units 06/03/23  0519 06/02/23  1538   ALK PHOS U/L  --  80   ALT U/L  --  13   AST U/L  --  10   BUN mg/dL 40* 33*   CALCIUM mg/dL 8 5 8 8   CHLORIDE mmol/L 102 99   CO2 mmol/L 30 32   CREATININE mg/dL 6 16* 4 61*   POTASSIUM mmol/L 4 2 3 6         Imaging: I have personally reviewed pertinent reports      EKG/Telemtry: No events    Scheduled Meds:  Current Facility-Administered Medications   Medication Dose Route Frequency Provider Last Rate   • acetaminophen  650 mg Oral Q6H PRN Ana María Rios MD     • aspirin  81 mg Oral Daily Ana María Rios MD     • atorvastatin  40 mg Oral Daily With Alex Perez MD     • cholecalciferol  1,000 Units Oral Daily Ana María Rios MD     • divalproex sodium  250 mg Oral Q12H [address] Ana María Rios MD     • docusate sodium  100 mg Oral BID Ana María Rios MD     • heparin (porcine)  5,000 Units Subcutaneous [facility] Ana María Rios MD     • insulin lispro  1-5 Units Subcutaneous TID [facility] Ana María Rios MD     • insulin lispro  1-5 Units Subcutaneous HS Ana María Rios MD     • metoprolol succinate "50 mg Oral BID Eduardo Masterson MD     • ondansetron  4 mg Intravenous Q6H PRN Eduardo Masterson MD     • prasugrel  5 mg Oral Daily Eduardo Masterson MD     • sacubitril-valsartan  1 tablet Oral Daily Abbi Agustin MD     • senna  1 tablet Oral Daily Eduardo Masterson MD     • sevelamer  800 mg Oral TID With Meals Eduardo Masterson MD       Continuous Infusions:       VTE Pharmacologic Prophylaxis: Heparin  VTE Mechanical Prophylaxis: sequential compression device    This note was completed in part utilizing In[employer] fluency direct voice recognition software  Grammatical errors, random word insertion, spelling mistakes, occasional wrong word or \"sound-alike\" substitutions and incomplete sentences may be an occasional consequence of the system secondary to software limitations, ambient noise and hardware issues  At the time of dictation, efforts were made to edit, clarify and /or correct errors  Please read the chart carefully and recognize, using context, where substitutions have occurred  If you have any questions or concerns about the context, text or information contained within the body of this dictation, please contact myself, the provider, for further clarification          " Child with croup. Will give anticipatory guidance and have them follow up with the primary care provider

## 2023-06-04 NOTE — PROGRESS NOTES
NEPHROLOGY PROGRESS NOTE   Daniel Penaloza 61 y o  male MRN: [id number]  [facility] Encounter: [id number]  Reason for Consult: ESRD    Assessment/Plan:  1  End-stage renal disease on maintenance hemodialysis currently Monday Wednesday Friday, [facility]  2  Recurrent chest pain, management as per primary service and cardiology  3  Ischemic cardiomyopathy with a previous ejection fraction of 35%, estimated dry weight 95 kg  4  Multivessel coronary disease with recent drug-eluting stent to the mid LAD  5  Anemia of chronic kidney disease hemoglobin currently appears stable at 10 2  6  CKD associate mineral bone disorder, continue with Renagel and Sensipar 30 mg 3 times weekly  7  Labile blood pressures, monitor closely with Entresto  8  Access currently with left upper arm AV fistula  PLAN:  · We will continue with maintenance hemodialysis currently Monday Wednesday Friday  · Estimated dry weight 95 kg  · Anticipated observation with hemodialysis tomorrow in regards to recurrent chest pain  · No changes from nephrology standpoint    SUBJECTIVE:  Seen and examined  Patient awake alert  Offers no new complaints  No further chest pain  Denies any shortness of breath  No abdominal pain  Review of Systems    OBJECTIVE:  Current Weight: Weight - Scale: 97 2 kg (214 lb 4 6 oz)  Vitals:    06/04/23 0551 06/04/23 0556 06/04/23 0600 06/04/23 0725   BP: 101/57 114/62  96/60   Pulse: 78 84  79   Resp:       Temp: 97 8 °F (36 6 °C)   (!) 97 4 °F (36 3 °C)   TempSrc:       SpO2: 96% 97%  98%   Weight:   97 2 kg (214 lb 4 6 oz)        Intake/Output Summary (Last 24 hours) at 6/4/2023 0931  Last data filed at 6/4/2023 0747  Gross per 24 hour   Intake 840 ml   Output 350 ml   Net 490 ml       Physical Exam  Constitutional:       Appearance: He is not ill-appearing  Eyes:      General: No scleral icterus  Cardiovascular:      Rate and Rhythm: Normal rate and regular rhythm     Pulmonary:      Effort: Pulmonary effort is normal       Breath sounds: Normal breath sounds  Abdominal:      General: There is no distension  Palpations: Abdomen is soft  Musculoskeletal:      Right lower leg: No edema  Left lower leg: No edema  Skin:     General: Skin is warm and dry  Findings: No rash  Neurological:      Mental Status: He is alert and oriented to person, place, and time           Medications:    Current Facility-Administered Medications:   •  acetaminophen (TYLENOL) tablet 650 mg, 650 mg, Oral, Q6H PRN, Chuy Becerra MD, 650 mg at 06/03/23 0556  •  aspirin (ECOTRIN LOW STRENGTH) EC tablet 81 mg, 81 mg, Oral, Daily, Chuy Becerra MD, 81 mg at 06/03/23 0848  •  atorvastatin (LIPITOR) tablet 40 mg, 40 mg, Oral, Daily With Juli Souza MD, 40 mg at 06/03/23 1742  •  cholecalciferol (VITAMIN D3) tablet 1,000 Units, 1,000 Units, Oral, Daily, Chuy Becerra MD, 1,000 Units at 06/03/23 0848  •  divalproex sodium (DEPAKOTE SPRINKLE) capsule 250 mg, 250 mg, Oral, Q12H [address], Chuy Becerra MD, 250 mg at 06/03/23 2053  •  docusate sodium (COLACE) capsule 100 mg, 100 mg, Oral, BID, Chuy Becerra MD, 100 mg at 06/03/23 0848  •  heparin (porcine) subcutaneous injection 5,000 Units, 5,000 Units, Subcutaneous, Q8H [address], 5,000 Units at 06/04/23 0542 **AND** [COMPLETED] Platelet count, , , Once, Chuy Becerra MD  •  insulin lispro (HumaLOG) 100 units/mL subcutaneous injection 1-5 Units, 1-5 Units, Subcutaneous, TID AC **AND** Fingerstick Glucose (POCT), , , TID AC, Chuy Becerra MD  •  insulin lispro (HumaLOG) 100 units/mL subcutaneous injection 1-5 Units, 1-5 Units, Subcutaneous, HS, Chuy Becerra MD, 1 Units at 06/02/23 2129  •  metoprolol succinate (TOPROL-XL) 24 hr tablet 50 mg, 50 mg, Oral, BID, Chuy Becerra MD, 50 mg at 06/03/23 2052  •  ondansetron [facility]) injection 4 mg, 4 mg, Intravenous, Q6H PRN, Chuy Becerra MD  •  prasugrel (EFFIENT) tablet 5 mg, 5 mg, Oral, Daily, Chuy MD Mariam, 5 mg at 06/03/23 0848  •  sacubitril-valsartan (ENTRESTO) 24-26 MG per tablet 1 tablet, 1 tablet, Oral, Daily, Yamel Ly MD  •  senna (SENOKOT) tablet 8 6 mg, 1 tablet, Oral, Daily, Calvin Emery MD, 8 6 mg at 06/03/23 0848  •  sevelamer (RENAGEL) tablet 800 mg, 800 mg, Oral, TID With Meals, Calvin Emery MD, 800 mg at 06/03/23 1320    Laboratory Results:  Results from last 7 days   Lab Units 06/03/23  0519 06/02/23  2126 06/02/23  1538   BUN mg/dL 40*  --  33*   CALCIUM mg/dL 8 5  --  8 8   CHLORIDE mmol/L 102  --  99   CO2 mmol/L 30  --  32   CREATININE mg/dL 6 16*  --  4 61*   HEMATOCRIT % 34 0*  --  33 2*   HEMOGLOBIN g/dL 10 2*  --  10 7*   POTASSIUM mmol/L 4 2  --  3 6   PLATELETS Thousands/uL 103* 94* 96*   WBC Thousand/uL 4 76  --  3 86*

## 2023-06-04 NOTE — ASSESSMENT & PLAN NOTE
· History of coronary disease with multivessel involvement status post stent placements  · Continue aspirin metoprolol atorvastatin Effient  · Cardiology following

## 2023-06-05 ENCOUNTER — APPOINTMENT (INPATIENT)
Dept: DIALYSIS | Facility: HOSPITAL | Age: 63
End: 2023-06-05
Payer: MEDICARE

## 2023-06-05 ENCOUNTER — PATIENT OUTREACH (OUTPATIENT)
Dept: CASE MANAGEMENT | Facility: OTHER | Age: 63
End: 2023-06-05

## 2023-06-05 VITALS
RESPIRATION RATE: 18 BRPM | TEMPERATURE: 96.5 F | HEART RATE: 74 BPM | BODY MASS INDEX: 31.97 KG/M2 | OXYGEN SATURATION: 94 % | WEIGHT: 216.49 LBS | DIASTOLIC BLOOD PRESSURE: 77 MMHG | SYSTOLIC BLOOD PRESSURE: 117 MMHG

## 2023-06-05 LAB
GLUCOSE SERPL-MCNC: 114 MG/DL (ref 65–140)
GLUCOSE SERPL-MCNC: 114 MG/DL (ref 65–140)

## 2023-06-05 PROCEDURE — 99239 HOSP IP/OBS DSCHRG MGMT >30: CPT | Performed by: PHYSICIAN ASSISTANT

## 2023-06-05 PROCEDURE — 99232 SBSQ HOSP IP/OBS MODERATE 35: CPT | Performed by: INTERNAL MEDICINE

## 2023-06-05 PROCEDURE — 99232 SBSQ HOSP IP/OBS MODERATE 35: CPT | Performed by: PHYSICIAN ASSISTANT

## 2023-06-05 PROCEDURE — 82948 REAGENT STRIP/BLOOD GLUCOSE: CPT

## 2023-06-05 PROCEDURE — 90935 HEMODIALYSIS ONE EVALUATION: CPT | Performed by: INTERNAL MEDICINE

## 2023-06-05 PROCEDURE — 5A1D70Z PERFORMANCE OF URINARY FILTRATION, INTERMITTENT, LESS THAN 6 HOURS PER DAY: ICD-10-PCS | Performed by: INTERNAL MEDICINE

## 2023-06-05 PROCEDURE — C9113 INJ PANTOPRAZOLE SODIUM, VIA: HCPCS

## 2023-06-05 RX ORDER — PANTOPRAZOLE SODIUM 40 MG/10ML
40 INJECTION, POWDER, LYOPHILIZED, FOR SOLUTION INTRAVENOUS ONCE
Status: COMPLETED | OUTPATIENT
Start: 2023-06-05 | End: 2023-06-05

## 2023-06-05 RX ORDER — HYDROMORPHONE HCL IN WATER/PF 6 MG/30 ML
0.2 PATIENT CONTROLLED ANALGESIA SYRINGE INTRAVENOUS ONCE
Status: COMPLETED | OUTPATIENT
Start: 2023-06-05 | End: 2023-06-05

## 2023-06-05 RX ORDER — SUCRALFATE 1 G/1
1 TABLET ORAL ONCE
Status: DISCONTINUED | OUTPATIENT
Start: 2023-06-05 | End: 2023-06-05 | Stop reason: HOSPADM

## 2023-06-05 RX ORDER — MIDODRINE HYDROCHLORIDE 2.5 MG/1
2.5 TABLET ORAL AS NEEDED
Qty: 30 TABLET | Refills: 0 | Status: SHIPPED | OUTPATIENT
Start: 2023-06-05

## 2023-06-05 RX ORDER — SACUBITRIL AND VALSARTAN 24; 26 MG/1; MG/1
1 TABLET, FILM COATED ORAL DAILY
Qty: 30 TABLET | Refills: 0 | Status: SHIPPED | OUTPATIENT
Start: 2023-06-05

## 2023-06-05 RX ADMIN — Medication 1000 UNITS: at 09:01

## 2023-06-05 RX ADMIN — HYDROMORPHONE HYDROCHLORIDE 0.2 MG: 0.2 INJECTION, SOLUTION INTRAMUSCULAR; INTRAVENOUS; SUBCUTANEOUS at 01:30

## 2023-06-05 RX ADMIN — HEPARIN SODIUM 5000 UNITS: 5000 INJECTION INTRAVENOUS; SUBCUTANEOUS at 13:06

## 2023-06-05 RX ADMIN — HEPARIN SODIUM 5000 UNITS: 5000 INJECTION INTRAVENOUS; SUBCUTANEOUS at 05:26

## 2023-06-05 RX ADMIN — DIVALPROEX SODIUM 250 MG: 125 CAPSULE ORAL at 09:05

## 2023-06-05 RX ADMIN — METOPROLOL SUCCINATE 50 MG: 50 TABLET, EXTENDED RELEASE ORAL at 09:01

## 2023-06-05 RX ADMIN — PANTOPRAZOLE SODIUM 40 MG: 40 INJECTION, POWDER, FOR SOLUTION INTRAVENOUS at 01:30

## 2023-06-05 RX ADMIN — PRASUGREL 5 MG: 10 TABLET, FILM COATED ORAL at 09:04

## 2023-06-05 RX ADMIN — ASPIRIN 81 MG: 81 TABLET, COATED ORAL at 09:01

## 2023-06-05 NOTE — WOUND OSTOMY CARE
Consult Note - Wound   Della Hartman 61 y o  male MRN: [id number]  Unit/Bed#: [facility] 528-01 Encounter: [id number]        History and Present Illness:  Patient is a 60 yo male that was admitted to [facility] for treatment of chest pain  Patient has a PMH of end-stage renal disease on dialysis, type 2 diabetes mellitus, hypertension hyperlipidemia  Patient is a min/moderate assist for turning and repositioning  Patient reports continence of bowel and bladder  On assessment, patient is seen sitting OOB in recliner  Wound Care was consulted for left heel wound  Assessment Findings:   Patient refused assessment of sacro-buttocks - reports no wounds or pain in area  Patient sees Dr Sera Snow for Podiatry Outpatient- patient educated on need to continue to see outpatient podiatrist - patient stated understanding  1  POA Left Heel Wound: unclear etiology- diabetic vs pressure  Light purple non-blanchable tissue and black eschar measured together  Eschar is well adhered  No skin loss or drainage present  Will recommend allevyn foam dressing to area  Right heel is dry, intact and blanches  No induration, fluctuance, odor, warmth/temperature differences, redness, or purulence noted to the above noted wounds and skin areas assessed  New dressings applied per orders listed below  Patient tolerated well- no s/s of non-verbal pain or discomfort observed during the encounter  Bedside nurse aware of plan of care  See flow sheets for more detailed assessment findings  Orders listed below and wound care will continue to follow, call or tiger text with questions  Skin Care Plan:  1-Left Heel: Cleanse with NS and pat dry  Apply heel allevyn foam dressing to area  Jaime with T for Treatment and change every other day or PRN  2-Turn/reposition q2h or when medically stable for pressure re-distribution on skin   3-Elevate heels to offload pressure    4-Moisturize skin daily with skin nourishing cream  5-Ehob cushion in chair when out of bed  6-Preventative Hydraguard to right heel and sacro-buttocks BID and PRN  Continue to follow-up with outpatient podiatry  Wounds:  Wound 06/02/23 Pressure Injury Heel Left (Active)   Wound Image   06/05/23 1302   Wound Description Black;Eschar 06/05/23 1302   Lani-wound Assessment Intact 06/05/23 1302   Wound Length (cm) 2 5 cm 06/05/23 1302   Wound Width (cm) 2 cm 06/05/23 1302   Wound Depth (cm) 0 cm 06/05/23 1302   Wound Surface Area (cm^2) 5 cm^2 06/05/23 1302   Wound Volume (cm^3) 0 cm^3 06/05/23 1302   Calculated Wound Volume (cm^3) 0 cm^3 06/05/23 1302   Drainage Amount None 06/05/23 1302   Non-staged Wound Description Not applicable 49/18/66 5251   Treatments Cleansed;Site care 06/05/23 1302   Dressing Foam, Silicon (eg   Allevyn, etc) 06/05/23 1302   Dressing Changed New 06/05/23 1302   Patient Tolerance Tolerated well 06/05/23 1302   Dressing Status Intact;Dry;Clean 06/05/23 71 JAG Ram

## 2023-06-05 NOTE — ASSESSMENT & PLAN NOTE
Lab Results   Component Value Date    CREATININE 6 16 (H) 06/03/2023    CREATININE 4 61 (H) 06/02/2023    CREATININE 3 94 (H) 03/27/2023    EGFR 8 06/03/2023    EGFR 12 06/02/2023    EGFR 15 03/27/2023     · Dialysis per nephrology

## 2023-06-05 NOTE — ASSESSMENT & PLAN NOTE
· BP stable  · Entresto reduced to once every day dosing  · Cardiology recommends discharge on midodrine 2 5mg PO PRN for SBP<90 on dialysis days

## 2023-06-05 NOTE — ASSESSMENT & PLAN NOTE
Lab Results   Component Value Date    HGBA1C 4 9 04/18/2023       Recent Labs     06/04/23  1136 06/04/23  1632 06/05/23  0651 06/05/23  1103   POCGLU 136 99 114 114       Blood Sugar Average: Last 72 hrs:  (P) 116 8     · Monitor Accu-Cheks  · Avoid hypoglycemia

## 2023-06-05 NOTE — PLAN OF CARE
Target UF Goal  3 2  L as tolerated  Patient dialyzing for 3 hours hours on 3 K bath for serum K of  4 2  per protocol  Treatment plan reviewed with Nephrology  Post-Dialysis RN Treatment Note    Blood Pressure:  Pre 109/70 mm/Hg  Post 117/77 mmHg   EDW  95 kg    Weight:  Pre 98 9 kg   Post 93 8 kg   Mode of weight measurement: Standing Scale   Volume Removed  2361 ml    Treatment duration 2 hours and 23 minutes    NS given  Yes, 100ml patient cramping  Treatment shortened?  Yes, describe: Pt requested 3 hour tx, Dr Gwyneth Alpers aware   Medications given during Rx None Reported   Estimated Kt/V  0 75   Access type: AV fistula   Access Issues: No    Report called to primary nurse   Yes,  Miryam Phelps RN          Problem: METABOLIC, FLUID AND ELECTROLYTES - ADULT  Goal: Electrolytes maintained within normal limits  Description: INTERVENTIONS:  - Monitor labs and assess patient for signs and symptoms of electrolyte imbalances  - Administer electrolyte replacement as ordered  - Monitor response to electrolyte replacements, including repeat lab results as appropriate  - Instruct patient on fluid and nutrition as appropriate  Outcome: Progressing  Goal: Fluid balance maintained  Description: INTERVENTIONS:  - Monitor labs   - Monitor I/O and WT  - Instruct patient on fluid and nutrition as appropriate  - Assess for signs & symptoms of volume excess or deficit  Outcome: Progressing

## 2023-06-05 NOTE — PROGRESS NOTES
[address] [facility]  Progress Note  Name: Diony Briseno  MRN: [id number]  [facility] Date of Admission: 6/2/2023   Date of Service: 6/5/2023  Hospital Day: 2    Assessment/Plan   * Chest pain  Assessment & Plan  · Patient presented with chest pain while at dialysis  · Cardiology plans to observe inpatient for symptoms while on dialysis noted  · Continue aspirin, metoprolol, atorvastatin, Effient  · Cardiology following  · Telemetry       CAD, multiple vessel  Assessment & Plan  · History of coronary disease with multivessel involvement status post stent placements  · Continue aspirin, metoprolol, atorvastatin, Effient  · Cardiology following     Elevated troponin  Assessment & Plan  · Elevated troponins noted  · Cardiology following    ESRD on dialysis [facility])  Assessment & Plan  Lab Results   Component Value Date    CREATININE 6 16 (H) 06/03/2023    CREATININE 4 61 (H) 06/02/2023    CREATININE 3 94 (H) 03/27/2023    EGFR 8 06/03/2023    EGFR 12 06/02/2023    EGFR 15 03/27/2023     · Dialysis per nephrology    Ischemic cardiomyopathy  Assessment & Plan  · Ischemic cardiomyopathy  · 2D echo EF 35%  · Continue metoprolol  · Entresto reduced to once every day dosing per cardiology  · Cardiology following    Type 2 diabetes mellitus with chronic kidney disease on chronic dialysis, without long-term current use of insulin [facility])  Assessment & Plan  Lab Results   Component Value Date    HGBA1C 4 9 04/18/2023       Recent Labs     06/04/23  1136 06/04/23  1632 06/05/23  0651 06/05/23  1103   POCGLU 136 99 114 114       Blood Sugar Average: Last 72 hrs:  (P) 116 8     · Monitor Accu-Cheks  · Avoid hypoglycemia    Primary hypertension  Assessment & Plan  · BP stable  · Entresto reduced to once every day dosing  · Cardiology recommends midodrine with dialysis to avoid hypotension per cardiology however patient and family reluctant at this time  · Avoid hypotension    Mood disorder [facility])  Assessment & Plan  · Continue Depakote           VTE Pharmacologic Prophylaxis: VTE Score: 3 Moderate Risk (Score 3-4) - Pharmacological DVT Prophylaxis Ordered: heparin  Patient Centered Rounds: I performed bedside rounds with nursing staff today  Abiel  Discussions with Specialists or Other Care Team Provider:     Education and Discussions with Family / Patient: Updated  (son) at bedside  Total Time Spent on Date of Encounter in care of patient: 25 minutes This time was spent on one or more of the following: performing physical exam; counseling and coordination of care; obtaining or reviewing history; documenting in the medical record; reviewing/ordering tests, medications or procedures; communicating with other healthcare professionals and discussing with patient's family/caregivers  Current Length of Stay: 2 day(s)  Current Patient Status: Inpatient   Certification Statement: The patient will continue to require additional inpatient hospital stay due to CP, pending further Cardiology and Nephrology recs  Discharge Plan: pending further cardiology and nephrology recs    Code Status: Level 1 - Full Code    Subjective:   Mr Klever Chris denies CP or SOB now  He reports being scared    Objective:     Vitals:   Temp (24hrs), Av 2 °F (36 8 °C), Min:98 °F (36 7 °C), Max:98 3 °F (36 8 °C)    Temp:  [98 °F (36 7 °C)-98 3 °F (36 8 °C)] 98 °F (36 7 °C)  HR:  [67-82] 67  Resp:  [16-20] 16  BP: (102-131)/(56-81) 131/81  SpO2:  [94 %-99 %] 94 %  Body mass index is 31 97 kg/m²  Input and Output Summary (last 24 hours): Intake/Output Summary (Last 24 hours) at 2023 1128  Last data filed at 2023 0730  Gross per 24 hour   Intake 668 ml   Output 200 ml   Net 468 ml       Physical Exam:   Physical Exam  Vitals reviewed  Constitutional:       Appearance: He is obese        Comments: Patient seen sitting in bedside chair, NAD, son at bedside   Cardiovascular:      Rate and Rhythm: Normal rate and regular rhythm  Pulmonary:      Effort: Pulmonary effort is normal  No respiratory distress  Breath sounds: Normal breath sounds  Abdominal:      General: Bowel sounds are normal       Palpations: Abdomen is soft  Tenderness: There is no abdominal tenderness  Musculoskeletal:      Right lower leg: No edema  Left lower leg: No edema  Skin:     General: Skin is warm  Neurological:      Mental Status: He is alert  Psychiatric:      Comments: tearful          Additional Data:     Labs:  Results from last 7 days   Lab Units 06/03/23  0519 06/02/23  2126 06/02/23  1538   EOS PCT %  --   --  2   HEMATOCRIT % 34 0*  --  33 2*   HEMOGLOBIN g/dL 10 2*  --  10 7*   LYMPHS PCT %  --   --  24   MONOS PCT %  --   --  12   NEUTROS PCT %  --   --  61   PLATELETS Thousands/uL 103*   < > 96*   WBC Thousand/uL 4 76  --  3 86*    < > = values in this interval not displayed       Results from last 7 days   Lab Units 06/03/23  0519 06/02/23  1538   ANION GAP mmol/L 3* 2*   ALBUMIN g/dL  --  3 3*   ALK PHOS U/L  --  80   ALT U/L  --  13   AST U/L  --  10   BUN mg/dL 40* 33*   CALCIUM mg/dL 8 5 8 8   CHLORIDE mmol/L 102 99   CO2 mmol/L 30 32   CREATININE mg/dL 6 16* 4 61*   GLUCOSE RANDOM mg/dL 90 161*   POTASSIUM mmol/L 4 2 3 6   SODIUM mmol/L 135 133*   TOTAL BILIRUBIN mg/dL  --  0 67         Results from last 7 days   Lab Units 06/05/23  1103 06/05/23  0651 06/04/23  1632 06/04/23  1136 06/04/23  0545 06/03/23  2051 06/03/23  1610 06/03/23  1059 06/03/23  0639 06/02/23  2125   POC GLUCOSE mg/dl 114 114 99 136 86 108 144* 104 91 172*               Lines/Drains:  Invasive Devices     Peripheral Intravenous Line  Duration           Peripheral IV 06/04/23 Proximal;Right;Ventral (anterior) Forearm 1 day          Line  Duration           Hemodialysis AV Fistula Left Upper arm -- days                  Telemetry:  Telemetry Orders (From admission, onward)             24 Hour Telemetry Monitoring  Continuous x 24 Hours (Telem)        Question:  Reason for 24 Hour Telemetry  Answer:  PCI/EP study (including pacer and ICD implementation), Cardiac surgery, MI, abnormal cardiac cath, and chest pain- rule out MI                 Telemetry Reviewed: Normal Sinus Rhythm  Indication for Continued Telemetry Use: Acute MI/Unstable Angina/Rule out ACS             Imaging: Reviewed radiology reports from this admission including: chest xray    Recent Cultures (last 7 days):         Last 24 Hours Medication List:   Current Facility-Administered Medications   Medication Dose Route Frequency Provider Last Rate   • acetaminophen  650 mg Oral Q6H PRN Jarad Damon MD     • aspirin  81 mg Oral Daily Jarad Damon MD     • atorvastatin  40 mg Oral Daily With Remy Dixon MD     • cholecalciferol  1,000 Units Oral Daily Jarad Damon MD     • divalproex sodium  250 mg Oral Q12H [facility] Jarad Damon MD     • docusate sodium  100 mg Oral BID Jarad Damon MD     • heparin (porcine)  5,000 Units Subcutaneous [facility] Jarad Damon MD     • insulin lispro  1-5 Units Subcutaneous TID [facility] Jarad Damon MD     • insulin lispro  1-5 Units Subcutaneous HS Jarad Damon MD     • metoprolol succinate  50 mg Oral BID Jarad Damon MD     • ondansetron  4 mg Intravenous Q6H PRN Jarad Damon MD     • prasugrel  5 mg Oral Daily Jarad Damon MD     • sacubitril-valsartan  1 tablet Oral Daily Smitha Tee MD     • senna  1 tablet Oral Daily Jarad Damon MD     • sevelamer  800 mg Oral TID With Meals Jarad Damon MD     • sucralfate  1 g Oral Once Wendi Swartz PA-C          Today, Patient Was Seen By: Jazlyn Sin PA-C    **Please Note: This note may have been constructed using a voice recognition system  **

## 2023-06-05 NOTE — ASSESSMENT & PLAN NOTE
· BP stable  · Entresto reduced to once every day dosing  · Cardiology recommends midodrine with dialysis to avoid hypotension per cardiology however patient and family reluctant at this time  · Avoid hypotension

## 2023-06-05 NOTE — DISCHARGE INSTR - OTHER ORDERS
Skin Care Plan:  1-Left Heel: Cleanse with NS and pat dry  Apply heel allevyn foam dressing to area  Jaime with T for Treatment and change every other day or PRN  2-Turn/reposition q2h or when medically stable for pressure re-distribution on skin   3-Elevate heels to offload pressure  4-Moisturize skin daily with skin nourishing cream  5-Ehob cushion in chair when out of bed  6-Preventative Hydraguard to right heel and sacro-buttocks BID and PRN  Continue to follow-up with outpatient podiatry

## 2023-06-05 NOTE — ASSESSMENT & PLAN NOTE
Lab Results   Component Value Date    HGBA1C 4 9 04/18/2023       Recent Labs     06/04/23  1136 06/04/23  1632 06/05/23  0651 06/05/23  1103   POCGLU 136 99 114 114       Blood Sugar Average: Last 72 hrs:  (P) 116 8     · Continue home meds  · Follow up with PCP

## 2023-06-05 NOTE — PROGRESS NOTES
ADT notification received 6/3 for patient admission to [facility]   Patient recently initiated [facility] and became hypotensive at Dialysis with c/o chest pain  ECG showed inferolateral ST segment depressions which cardiology believed it to be from impaired perfusion from hypotension during dialysis  Entresto decreased to once daily  Further recommendation was to introduce midodrine in the am of dialysis but family declined at present time  This CM will continue to monitor via chart review throughout hospitalization

## 2023-06-05 NOTE — PROGRESS NOTES
Progress Note - Cardiology   Sally Reyes 61 y o  male MRN: [id number]  [facility] Encounter: [id number]    Assessment:  Principal Problem:    Chest pain  Active Problems:    Type 2 diabetes mellitus with chronic kidney disease on chronic dialysis, without long-term current use of insulin (HCC)    ESRD on dialysis ([facility] [address] )    Primary hypertension    CAD, multiple vessel    Ischemic cardiomyopathy    Mood disorder ([facility])    Elevated troponin    61year old male known to me from the outpatient setting presenting for evaluation of chest pain related to hypotension during hemodialysis  His mild troponin elevation on arrival is not consistent with ACS, and more likely representative of non-MI demand in the setting of hypotension and residual coronary artery disease  He has remained chest pain-free since admission, including while on the hemodialysis circuit this afternoon  Plan:  Continue aspirin and Effient as ordered  Continue Entresto 24-25mg once daily  Continue Toprol-XL 50 mg twice daily  Add Midodrine 2 5mg once daily before dialysis on dialysis days if he is persistently hypotensive  Continue LifeVest upon discharge  Repeat echo as previously ordered  Stable for discharge from a cardiac standpoint, to follow up closely as an outpatient  Subjective/Objective     Subjective: Patient seen and examined prior to and during hemodialysis  Reports no recurrent chest pain, including and examined on the dialysis circuit        Vitals: /77 (BP Location: Right arm)   Pulse 68   Temp 97 6 °F (36 4 °C) (Tympanic)   Resp 18   Wt 98 2 kg (216 lb 7 9 oz)   SpO2 94%   BMI 31 97 kg/m²   Vitals:    06/04/23 0600 06/05/23 0256   Weight: 97 2 kg (214 lb 4 6 oz) 98 2 kg (216 lb 7 9 oz)     Orthostatic Blood Pressures    Flowsheet Row Most Recent Value   Blood Pressure 120/77 filed at 06/05/2023 1500   Patient Position - Orthostatic VS Sitting filed at 06/05/2023 1500            Intake/Output Summary (Last 24 hours) at 6/5/2023 1525  Last data filed at 6/5/2023 1340  Gross per 24 hour   Intake 750 ml   Output 100 ml   Net 650 ml       Invasive Devices     Peripheral Intravenous Line  Duration           Peripheral IV 06/04/23 Proximal;Right;Ventral (anterior) Forearm 1 day          Line  Duration           Hemodialysis AV Fistula Left Upper arm -- days              Physical Examination:  Gen: A&Ox3  HEENT: NC/AT, PERRL, no JVD  CVS: RRR, S1S2 audible and w/o rub or gallop  Soft systolic flow murmur in the right second intercostal space  LUE AV fistula  Resp: Clear to ascultation bilaterally, no wheeze, rales, ronchi  Abd: Soft, non-tender, non-distended  MSK: No edema      Lab Results: I have personally reviewed pertinent lab results  Imaging: I have personally reviewed pertinent reports  Counseling / Coordination of Care  Total time spent today 40 minutes  Greater than 50% of total time was spent with the patient and / or family counseling and / or coordination of care  A description of the counseling / coordination of care: Review of hospitalization, plan of care moving forward, goals of care, anticipated prognosis       Raymond Zaidi MD

## 2023-06-05 NOTE — APP STUDENT NOTE
AMANDO STUDENT  Inpatient Progress Note for TRAINING ONLY  Not Part of Legal Medical Record       Progress Note - Daniel Tremaine 61 y o  male MRN: [id number]    Unit/Bed#: [facility] 528-01 Encounter: [id number]      Assessment/Plan:  1  Chest pain  · Patient presented with chest pain while at dialysis: denied palpitations, shortness of breath, diaphoresis, nausea, vomiting; systolic BP was in 25T  · Cardiology following; plans to observe inpatient for symptoms while on dialysis  · Continue aspirin, metoprolol, atorvastatin, Effient  2  Elevated troponin  · Elevated troponins, likely secondary to impaired perfusion from hypotension during dialysis  · Cardiology following  3  Mood disorder  · Continue Depakote  4  Ischemic cardiomyopathy  · 4/24 2D echo: EF 35%  · Cardiology following; reduced Entresto to once daily dosing  · Continue metoprolol  5  CAD, multiple vessel  · History of coronary artery disease with multivessel involvement s/p stent placements  · Cardiology following  · Continue aspirin, metoprolol, atorvastatin, Effient  6  Primary hypertension  · Cardiology reduced Entresto to once every day dosing; recommends midodrine with dialysis to avoid hypotension however patient and family reluctant at this time  · Continue to monitor  · Avoid hypotension  7  ESRD on dialysis  Lab Results   Component Value Date    CREATININE 6 16 (H) 06/03/2023   · Nephrology following  · Dialysis M/W/F per nephrology  8  Type 2 diabetes mellitus with chronic kidney disease on chronic dialysis, without long-term current use of insulin  Lab Results   Component Value Date    HGBA1C 4 9 04/18/2023   · Last glucose 114  · Monitor Accu-Cheks  · Avoid hypoglycemia    Subjective:   Patient reports no acute complaints or changes overnight  Denies chest pain, shortness of breath, palpitations, dizziness, lightheadedness  Patient aware of hemodialysis today   Patient overall feels well, but expressed worry about his health and became tearful  Objective:    Vitals: Blood pressure 131/81, pulse 67, temperature 98 °F (36 7 °C), temperature source Oral, resp  rate 16, weight 98 2 kg (216 lb 7 9 oz), SpO2 94 %  ,Body mass index is 31 97 kg/m²        Intake/Output Summary (Last 24 hours) at 6/5/2023 1045  Last data filed at 6/5/2023 0730  Gross per 24 hour   Intake 668 ml   Output 200 ml   Net 468 ml     Lab Results   Component Value Date    HCT 34 0 (L) 06/03/2023    HGB 10 2 (L) 06/03/2023    MCV 98 06/03/2023     (L) 06/03/2023    WBC 4 76 06/03/2023     Lab Results   Component Value Date    AGAP 3 (L) 06/03/2023    ALKPHOS 80 06/02/2023    ALT 13 06/02/2023    ANIONGAP 9 01/03/2016    AST 10 06/02/2023    BILITOT 0 6 08/10/2016    BUN 40 (H) 06/03/2023    CALCIUM 8 5 06/03/2023     06/03/2023    CO2 30 06/03/2023    CREATININE 6 16 (H) 06/03/2023    EGFR 8 06/03/2023    GLUC 90 06/03/2023    GLUF 113 (H) 01/06/2023    K 4 2 06/03/2023     08/10/2016    PROT 6 7 08/10/2016    SODIUM 135 06/03/2023    TBILI 0 67 06/02/2023    TP 6 7 06/02/2023       Physical Exam: /81 (BP Location: Right arm)   Pulse 67   Temp 98 °F (36 7 °C) (Oral)   Resp 16   Wt 98 2 kg (216 lb 7 9 oz)   SpO2 94%   BMI 31 97 kg/m²   General appearance: alert and oriented, in no acute distress  Head: Normocephalic, without obvious abnormality, atraumatic  Lungs: clear to auscultation bilaterally  Heart: regular rate and rhythm, S1, S2 normal, no murmur, click, rub or gallop  Abdomen: soft, non-tender; bowel sounds normal; no masses,  no organomegaly  Extremities: extremities normal, warm and well-perfused; no cyanosis, clubbing, or edema  Pulses: 2+ and symmetric  Skin: Skin color, texture, turgor normal  No rashes or lesions     Invasive Devices     Peripheral Intravenous Line  Duration           Peripheral IV 06/04/23 Proximal;Right;Ventral (anterior) Forearm 1 day          Line  Duration           Hemodialysis AV Fistula Left Upper arm -- days Lab, Imaging and other studies: I have personally reviewed pertinent reports      VTE Pharmacologic Prophylaxis: Heparin

## 2023-06-05 NOTE — PROGRESS NOTES
NEPHROLOGY PROGRESS NOTE   Gale Monk 61 y o  male MRN: [id number]  Unit/Bed#: [facility] 528-01 Encounter: [id number]      HD procedure note    Patient was seen on hd at 1400  Tolerating treatment without difficulty  UF 3 liters today  bp elevated on treatment but stable overall  No CP today    ASSESSMENT & PLAN    1  ESKD on HD MWF [address]  • MWF treatment schedule  • Left UE av fistula, some aneurysms present  • Continue current treatments  • Electrolytes and Acid/Base status acceptable    2  Recurrant chest pain with hx of Ischemic Cardiomyopathy  • EF 35%   • EDW 95 kg  • UF with dialysis as tolerated  • Cardiology saw patient midodrine potentiall long term  • Wants to start entresto    3  Anemia of CKD   • Current hgn at goal    4  Hypertension  • BP elevated today  • Entresto    5  CKD-BMD  • Continue renvela for hyperphospatemia    DISCUSSION:    Seen on HD tolerating treamtent   No CP currently  OK for discharge and outpatient dialysis if no CP on dialysis today    SUBJECTIVE:    Tolerating treatment today  No cp, sob, fevers, chills    12 point review of systems was otherwise negative besides what is mentioned above      Medications:    Current Facility-Administered Medications:   •  acetaminophen (TYLENOL) tablet 650 mg, 650 mg, Oral, Q6H PRN, Julio Cesar Huynh MD, 650 mg at 06/03/23 0556  •  aspirin (ECOTRIN LOW STRENGTH) EC tablet 81 mg, 81 mg, Oral, Daily, Julio Cesar Huynh MD, 81 mg at 06/05/23 0901  •  atorvastatin (LIPITOR) tablet 40 mg, 40 mg, Oral, Daily With Trinity Ken MD, 40 mg at 06/04/23 1737  •  cholecalciferol (VITAMIN D3) tablet 1,000 Units, 1,000 Units, Oral, Daily, Julio Cesar Huynh MD, 1,000 Units at 06/05/23 0901  •  divalproex sodium (DEPAKOTE SPRINKLE) capsule 250 mg, 250 mg, Oral, Q12H [address], Julio Cesar Huynh MD, 250 mg at 06/05/23 8076  •  docusate sodium (COLACE) capsule 100 mg, 100 mg, Oral, BID, Julio Cesar Huynh MD, 100 mg at 06/04/23 0945  •  heparin (porcine) subcutaneous injection 5,000 Units, 5,000 Units, Subcutaneous, Q8H [address], 5,000 Units at 06/05/23 1306 **AND** [COMPLETED] Platelet count, , , Once, Benny Felder MD  •  insulin lispro (HumaLOG) 100 units/mL subcutaneous injection 1-5 Units, 1-5 Units, Subcutaneous, TID AC **AND** Fingerstick Glucose (POCT), , , TID AC, Benny Felder MD  •  insulin lispro (HumaLOG) 100 units/mL subcutaneous injection 1-5 Units, 1-5 Units, Subcutaneous, HS, Benny Felder MD, 1 Units at 06/02/23 2129  •  metoprolol succinate (TOPROL-XL) 24 hr tablet 50 mg, 50 mg, Oral, BID, Benny Felder MD, 50 mg at 06/05/23 0901  •  ondansetron [facility]) injection 4 mg, 4 mg, Intravenous, Q6H PRN, Benny Felder MD, 4 mg at 06/04/23 2338  •  prasugrel (EFFIENT) tablet 5 mg, 5 mg, Oral, Daily, Benny Felder MD, 5 mg at 06/05/23 3010  •  sacubitril-valsartan (ENTRESTO) 24-26 MG per tablet 1 tablet, 1 tablet, Oral, Daily, Gavin Parker MD, 1 tablet at 06/04/23 8568  •  senna (SENOKOT) tablet 8 6 mg, 1 tablet, Oral, Daily, Benny Felder MD, 8 6 mg at 06/04/23 0945  •  sevelamer (RENAGEL) tablet 800 mg, 800 mg, Oral, TID With Meals, Benny Felder MD, 800 mg at 06/04/23 1738  •  sucralfate (CARAFATE) tablet 1 g, 1 g, Oral, Once, Loreto FridayALLISON    OBJECTIVE:    Vitals:    06/05/23 0730 06/05/23 1340 06/05/23 1345 06/05/23 1400   BP: 131/81 109/70 117/77 169/98   BP Location: Right arm Right arm     Pulse: 67 60 60 62   Resp: 16 17 17 18   Temp: 98 °F (36 7 °C) 97 6 °F (36 4 °C)     TempSrc: Oral Tympanic     SpO2: 94%      Weight:            Temp:  [97 6 °F (36 4 °C)-98 3 °F (36 8 °C)] 97 6 °F (36 4 °C)  HR:  [60-79] 62  Resp:  [16-20] 18  BP: (102-169)/(56-98) 169/98  SpO2:  [94 %-99 %] 94 %     Body mass index is 31 97 kg/m²  Weight (last 2 days)     Date/Time Weight    06/05/23 0256 98 2 (216 49)    06/04/23 0600 97 2 (214 29)          I/O last 3 completed shifts: In: 968 [P O :968]  Out: 300 [Urine:300]    I/O this shift:   In: 500 "[P O :300; I V :200]  Out: -       Physical exam:    General: no acute distress, cooperative  Eyes: conjunctivae pink, anicteric sclerae  ENT: lips and mucous membranes moist, no exudates, normal external ears  Neck: ROM intact, no JVD  Chest: No respiratory distress, no accessory muscle use  CVS: normal rate, non pericardial friction rub  Abdomen: soft, non-tender, non-distended, normoactive bowel sounds  Extremities: no edema of both legs  Skin: no rash  Neuro: awake, alert, oriented, grossly intact  Psych:  Pleasant affect    Invasive Devices:      Lab Results:   Results from last 7 days   Lab Units 06/03/23  0519 06/02/23  2126 06/02/23  1538   ALK PHOS U/L  --   --  80   ALT U/L  --   --  13   AST U/L  --   --  10   BUN mg/dL 40*  --  33*   CALCIUM mg/dL 8 5  --  8 8   CHLORIDE mmol/L 102  --  99   CO2 mmol/L 30  --  32   CREATININE mg/dL 6 16*  --  4 61*   HEMATOCRIT % 34 0*  --  33 2*   HEMOGLOBIN g/dL 10 2*  --  10 7*   POTASSIUM mmol/L 4 2  --  3 6   PLATELETS Thousands/uL 103* 94* 96*   WBC Thousand/uL 4 76  --  3 86*         Portions of the record may have been created with voice recognition software  Occasional wrong word or \"sound a like\" substitutions may have occurred due to the inherent limitations of voice recognition software  Read the chart carefully and recognize, using context, where substitutions have occurred  If you have any questions, please contact the dictating provider      "

## 2023-06-05 NOTE — ASSESSMENT & PLAN NOTE
· History of coronary disease with multivessel involvement status post stent placements  · Continue aspirin, metoprolol, atorvastatin, Effient  · Cardiology following

## 2023-06-05 NOTE — ASSESSMENT & PLAN NOTE
· Patient presented with chest pain while at dialysis  · Continue aspirin, metoprolol, atorvastatin, Effient  · Cardiology following - they cleared they patient from their standpoint for discharge today - related to hypotension during HD and mild troponin elevation not consistent with ACS but rather more likely representative of non-MI demand per Cardiology

## 2023-06-05 NOTE — DISCHARGE SUMMARY
[address] [facility]  Discharge- Reji Wiseman 1960, 61 y o  male MRN: [id number]  Unit/Bed#: [facility] 528-01 Encounter: [id number]  Primary Care Provider: Sonido Quiles DO   Date and time admitted to hospital: 6/2/2023  3:13 PM    * Chest pain  Assessment & Plan  · Patient presented with chest pain while at dialysis  · Continue aspirin, metoprolol, atorvastatin, Effient  · Cardiology following - they cleared they patient from their standpoint for discharge today - related to hypotension during HD and mild troponin elevation not consistent with ACS but rather more likely representative of non-MI demand per Cardiology      CAD, multiple vessel  Assessment & Plan  · History of coronary disease with multivessel involvement status post stent placements  · Continue aspirin, metoprolol, atorvastatin, Effient  · Cardiology following     Elevated troponin  Assessment & Plan  · Not consistent with ACS and more likely representative of non-MI demand per Cardiology    ESRD on dialysis [facility])  Assessment & Plan  Lab Results   Component Value Date    CREATININE 6 16 (H) 06/03/2023    CREATININE 4 61 (H) 06/02/2023    CREATININE 3 94 (H) 03/27/2023    EGFR 8 06/03/2023    EGFR 12 06/02/2023    EGFR 15 03/27/2023     · Dialysis per nephrology    Ischemic cardiomyopathy  Assessment & Plan  · Ischemic cardiomyopathy  · 2D echo EF 35%  · Continue metoprolol  · Entresto reduced to once every day dosing per cardiology  · Cardiology following    Type 2 diabetes mellitus with chronic kidney disease on chronic dialysis, without long-term current use of insulin [facility])  Assessment & Plan  Lab Results   Component Value Date    HGBA1C 4 9 04/18/2023       Recent Labs     06/04/23  1136 06/04/23  1632 06/05/23  0651 06/05/23  1103   POCGLU 136 99 114 114       Blood Sugar Average: Last 72 hrs:  (P) 116 8     · Continue home meds  · Follow up with PCP    Primary hypertension  Assessment & Plan  · BP stable  · Entresto reduced to once every day dosing  · Cardiology recommends discharge on midodrine 2 5mg PO PRN for SBP<90 on dialysis days    Mood disorder [facility])  Assessment & Plan  · Continue Depakote      Medical Problems     Resolved Problems  Date Reviewed: 6/5/2023   None       Discharging Physician / Practitioner: Sarah Yi PA-C  PCP: Dominique Vazquez DO  Admission Date:   Admission Orders (From admission, onward)     Ordered        06/03/23 1334  Inpatient Admission  Once            06/02/23 1652  Place in Observation  Once                      Discharge Date: 06/05/23    Consultations During Hospital Stay:  · Cardiology  · Nephrology  · Wound Care    Procedures Performed:   · ECG  · CXR  · CBC  · CMP/BMP  · HS troponins    Significant Findings / Test Results:   · ECG: normal sinus rhythm  Right bundle branch block  T wave abnormality, consider inferolateral ischemia, as confirmed by reading Cardiologist   · CXR: airspace disease in the right lung  Correlate for infection vs asymmetric heart failure   · HS troponins: 401, 513, 494    Incidental Findings:   · None     Test Results Pending at Discharge (will require follow up): · None     Outpatient Tests Requested:  · Follow up with PCP, Cardiologist, Nephrologist, Podiatrist     Complications:  None    Reason for Admission: chest pain    Hospital Course:   Jose Flores is a 61 y o  male with CAD, ESRD on HD, ICM, DM2, HTN, mood disorder who originally presented to the hospital on 6/2/2023 due to chest pain at HD  Troponins were trended as above  Nephrology and Cardiology were consulted  Patient had HD today  I discussed with both Cardiology (Dr Jayleen Bowles) and Nephrology (Dr Vandana Lowe), both of whom cleared the patient from their service's standpoints for discharge today  Cardiology recommending decrease Entresto to once daily and start midodrine 2 5mg PO PRN for SBP<90 on HD days   I addition to Cardiology and Nephrology, I discussed with the patient, his son and wife, nurse, and  on day of discharge  Please see above list of diagnoses and related plan for additional information  Condition at Discharge: stable    Discharge Day Visit / Exam:   * Please refer to separate progress note for these details *    Discussion with Family: Updated  (wife and son) at bedside  Discharge instructions/Information to patient and family:   See after visit summary for information provided to patient and family  Provisions for Follow-Up Care:  See after visit summary for information related to follow-up care and any pertinent home health orders  Disposition:   Home    Planned Readmission: None     Discharge Statement:  I spent 47 minutes discharging the patient  This time was spent on the day of discharge  I had direct contact with the patient on the day of discharge  Greater than 50% of the total time was spent examining patient, answering all patient questions, arranging and discussing plan of care with patient as well as directly providing post-discharge instructions  Additional time then spent on discharge activities  Discharge Medications:  See after visit summary for reconciled discharge medications provided to patient and/or family        **Please Note: This note may have been constructed using a voice recognition system**

## 2023-06-05 NOTE — ASSESSMENT & PLAN NOTE
· Patient presented with chest pain while at dialysis  · Cardiology plans to observe inpatient for symptoms while on dialysis noted  · Continue aspirin, metoprolol, atorvastatin, Effient  · Cardiology following  · Telemetry

## 2023-06-06 ENCOUNTER — PATIENT OUTREACH (OUTPATIENT)
Dept: INTERNAL MEDICINE CLINIC | Facility: CLINIC | Age: 63
End: 2023-06-06

## 2023-06-06 ENCOUNTER — TRANSITIONAL CARE MANAGEMENT (OUTPATIENT)
Dept: INTERNAL MEDICINE CLINIC | Facility: CLINIC | Age: 63
End: 2023-06-06

## 2023-06-06 DIAGNOSIS — Z71.89 COMPLEX CARE COORDINATION: Primary | ICD-10-CM

## 2023-06-06 NOTE — PROGRESS NOTES
ADT notification received 6/5 for patient discharge from hospital   Patient hospitalized 3 days for chest pain  Review of chart completed  Patient former [facility] dialysis patient currently receiving Dialysis at [facility]  He has Mod-High risk for readmission score of 78%  Patient referred for complex care management  In basket sent to Outpatient RN Care Manager regarding the same

## 2023-06-08 ENCOUNTER — PATIENT OUTREACH (OUTPATIENT)
Dept: INTERNAL MEDICINE CLINIC | Facility: CLINIC | Age: 63
End: 2023-06-08

## 2023-06-08 ENCOUNTER — TELEPHONE (OUTPATIENT)
Dept: PODIATRY | Facility: CLINIC | Age: 63
End: 2023-06-08

## 2023-06-08 ENCOUNTER — TELEPHONE (OUTPATIENT)
Dept: INFECTIOUS DISEASES | Facility: CLINIC | Age: 63
End: 2023-06-08

## 2023-06-08 NOTE — TELEPHONE ENCOUNTER
Caller: [facility]  diseases office Sadie Hanson    Doctor/Office: Dr Kerline Levin    CB#: none given    Escalation: Care/PT having cardiac issues and cardiac Dr  Referred pt to infectious diseases for clearance for a SX in future but since you are already seeing/treating pts wound, would you clear him without seeing infectious disease? Since  is OOO until 6/19 I told them I would send this message  They will see him if needed, and will call pt to let him know what is happening  Just for your information  Thanks!

## 2023-06-08 NOTE — PROGRESS NOTES
Spoke with patients wife  I will have pcp office reach out for sooner appointment than 7/20/23  She had no questions about his medications or discharge instructions  Dialysis days are M/W/Fri mornings   She would like return phone call

## 2023-06-08 NOTE — TELEPHONE ENCOUNTER
Spoke with Tereasa Kussmaul at [facility]  Dr Robert Cortes is out of the office for two weeks (til June 19th)  Patient sees Dr Nick Amaya for foot wound and wanted to see if they would provide clearance as they see patient on a regular basis for this wound  Contacted patient to let him know that we received this referral for cardiac device placement to clear him for that procedure  Patient could wait to find out if Dr Robert Cortes would see patient for clearance purposes, or if patient would like ID visit we could certainly provide an appt  LM for pt toCB

## 2023-06-13 ENCOUNTER — PATIENT OUTREACH (OUTPATIENT)
Dept: INTERNAL MEDICINE CLINIC | Facility: CLINIC | Age: 63
End: 2023-06-13

## 2023-06-13 ENCOUNTER — APPOINTMENT (OUTPATIENT)
Dept: LAB | Facility: CLINIC | Age: 63
End: 2023-06-13
Payer: MEDICARE

## 2023-06-13 DIAGNOSIS — F39 MOOD DISORDER (HCC): ICD-10-CM

## 2023-06-13 DIAGNOSIS — I25.5 ISCHEMIC CARDIOMYOPATHY: ICD-10-CM

## 2023-06-13 LAB
ANION GAP SERPL CALCULATED.3IONS-SCNC: 12 MMOL/L (ref 4–13)
BUN SERPL-MCNC: 44 MG/DL (ref 5–25)
CALCIUM SERPL-MCNC: 8.8 MG/DL (ref 8.4–10.2)
CHLORIDE SERPL-SCNC: 97 MMOL/L (ref 96–108)
CO2 SERPL-SCNC: 31 MMOL/L (ref 21–32)
CREAT SERPL-MCNC: 7.36 MG/DL (ref 0.6–1.3)
GFR SERPL CREATININE-BSD FRML MDRD: 7 ML/MIN/1.73SQ M
GLUCOSE SERPL-MCNC: 102 MG/DL (ref 65–140)
POTASSIUM SERPL-SCNC: 4.7 MMOL/L (ref 3.5–5.3)
SODIUM SERPL-SCNC: 140 MMOL/L (ref 135–147)
VALPROATE SERPL-MCNC: 23 UG/ML (ref 50–100)

## 2023-06-13 PROCEDURE — 80164 ASSAY DIPROPYLACETIC ACD TOT: CPT

## 2023-06-13 PROCEDURE — 80048 BASIC METABOLIC PNL TOTAL CA: CPT

## 2023-06-13 PROCEDURE — 36415 COLL VENOUS BLD VENIPUNCTURE: CPT

## 2023-06-13 NOTE — PROGRESS NOTES
Spoke with patients wife  She said she cannot do an appointment this week for Leonidas Carney she will call office to set something up   Waqar Paige is doing okay no need for follow up call

## 2023-06-14 ENCOUNTER — TELEPHONE (OUTPATIENT)
Dept: CARDIOLOGY CLINIC | Facility: CLINIC | Age: 63
End: 2023-06-14

## 2023-06-14 ENCOUNTER — TELEPHONE (OUTPATIENT)
Dept: FAMILY MEDICINE CLINIC | Facility: CLINIC | Age: 63
End: 2023-06-14

## 2023-06-14 NOTE — TELEPHONE ENCOUNTER
BLEEDING IN L EAR ALL NIGHT, HE IS NOT BLEEDING NOW WHILE HE IS AT DIALYSIS, HE WILL BE FINISHED WITH DIALYSIS AT 1PM  AND WIFE ASKED IF YOU COULD SEE HIM AFTER THAT  OR WHAT WOULD YOU ADVISE?   SHE IS WORRIED BECAUSE HE IS ON BLOOD THINNERS

## 2023-06-14 NOTE — TELEPHONE ENCOUNTER
MD Rachel Byrd Doctor, RN  Please let him know potassium stable on labs today and see how he is tolerating entresto   Thanks

## 2023-06-20 ENCOUNTER — HOSPITAL ENCOUNTER (OUTPATIENT)
Dept: NON INVASIVE DIAGNOSTICS | Facility: CLINIC | Age: 63
Discharge: HOME | End: 2023-06-20
Payer: MEDICARE

## 2023-06-20 VITALS
WEIGHT: 216.49 LBS | DIASTOLIC BLOOD PRESSURE: 77 MMHG | SYSTOLIC BLOOD PRESSURE: 117 MMHG | BODY MASS INDEX: 27.78 KG/M2 | HEIGHT: 74 IN | HEART RATE: 66 BPM

## 2023-06-20 DIAGNOSIS — I25.5 ISCHEMIC CARDIOMYOPATHY: ICD-10-CM

## 2023-06-20 DIAGNOSIS — F41.8 ANXIETY ASSOCIATED WITH DEPRESSION: ICD-10-CM

## 2023-06-20 DIAGNOSIS — F39 MOOD DISORDER (HCC): ICD-10-CM

## 2023-06-20 LAB
AORTIC ROOT: 3.4 CM
AORTIC VALVE MEAN VELOCITY: 18.2 M/S
APICAL FOUR CHAMBER EJECTION FRACTION: 40 %
AV AREA BY CONTINUOUS VTI: 1.7 CM2
AV AREA PEAK VELOCITY: 1.7 CM2
AV LVOT MEAN GRADIENT: 2 MMHG
AV LVOT PEAK GRADIENT: 3 MMHG
AV MEAN GRADIENT: 14 MMHG
AV PEAK GRADIENT: 21 MMHG
AV REGURGITATION PRESSURE HALF TIME: 339 MS
AV VALVE AREA: 1.73 CM2
AV VELOCITY RATIO: 0.35
DOP CALC AO PEAK VEL: 2.28 M/S
DOP CALC AO VTI: 59.47 CM
DOP CALC LVOT AREA: 4.91 CM2
DOP CALC LVOT DIAMETER: 2.5 CM
DOP CALC LVOT PEAK VEL VTI: 20.96 CM
DOP CALC LVOT PEAK VEL: 0.8 M/S
DOP CALC LVOT STROKE INDEX: 46.7 ML/M2
DOP CALC LVOT STROKE VOLUME: 102.84 CM3
FRACTIONAL SHORTENING: 33 % (ref 28–44)
INTERVENTRICULAR SEPTUM IN DIASTOLE (PARASTERNAL SHORT AXIS VIEW): 1.1 CM
INTERVENTRICULAR SEPTUM: 1.1 CM (ref 0.6–1.1)
LAAS-AP2: 31.3 CM2
LAAS-AP4: 32.8 CM2
LEFT ATRIUM AREA SYSTOLE SINGLE PLANE A4C: 34.5 CM2
LEFT ATRIUM SIZE: 5.2 CM
LEFT INTERNAL DIMENSION IN SYSTOLE: 4.1 CM (ref 2.1–4)
LEFT VENTRICULAR INTERNAL DIMENSION IN DIASTOLE: 6.1 CM (ref 3.5–6)
LEFT VENTRICULAR POSTERIOR WALL IN END DIASTOLE: 1.1 CM
LEFT VENTRICULAR STROKE VOLUME: 116 ML
LVSV (TEICH): 116 ML
RIGHT ATRIUM AREA SYSTOLE A4C: 23.3 CM2
RIGHT VENTRICLE ID DIMENSION: 4.2 CM
SL CV AV DECELERATION TIME RETROGRADE: 1169 MS
SL CV AV PEAK GRADIENT RETROGRADE: 52 MMHG
SL CV LEFT ATRIUM LENGTH A2C: 6.6 CM
SL CV LV EF: 38
SL CV PED ECHO LEFT VENTRICLE DIASTOLIC VOLUME (MOD BIPLANE) 2D: 189 ML
SL CV PED ECHO LEFT VENTRICLE SYSTOLIC VOLUME (MOD BIPLANE) 2D: 73 ML
TR MAX PG: 54 MMHG
TR PEAK VELOCITY: 3.7 M/S
TRICUSPID VALVE PEAK REGURGITATION VELOCITY: 3.68 M/S

## 2023-06-20 PROCEDURE — 93321 DOPPLER ECHO F-UP/LMTD STD: CPT

## 2023-06-20 PROCEDURE — 93325 DOPPLER ECHO COLOR FLOW MAPG: CPT

## 2023-06-20 PROCEDURE — 93308 TTE F-UP OR LMTD: CPT | Performed by: INTERNAL MEDICINE

## 2023-06-20 PROCEDURE — C8924 2D TTE W OR W/O FOL W/CON,FU: HCPCS

## 2023-06-20 PROCEDURE — 93321 DOPPLER ECHO F-UP/LMTD STD: CPT | Performed by: INTERNAL MEDICINE

## 2023-06-20 PROCEDURE — 93325 DOPPLER ECHO COLOR FLOW MAPG: CPT | Performed by: INTERNAL MEDICINE

## 2023-06-20 RX ORDER — DIVALPROEX SODIUM 125 MG/1
250 CAPSULE, COATED PELLETS ORAL EVERY 12 HOURS SCHEDULED
Qty: 360 CAPSULE | Refills: 1 | Status: SHIPPED | OUTPATIENT
Start: 2023-06-20

## 2023-06-20 RX ADMIN — PERFLUTREN 0.4 ML/MIN: 6.52 INJECTION, SUSPENSION INTRAVENOUS at 08:40

## 2023-06-21 ENCOUNTER — HOSPITAL ENCOUNTER (OUTPATIENT)
Dept: VASCULAR ULTRASOUND | Facility: HOSPITAL | Age: 63
Discharge: HOME | End: 2023-06-21
Attending: PODIATRIST
Payer: MEDICARE

## 2023-06-21 DIAGNOSIS — L97.421 ULCER OF LEFT HEEL, LIMITED TO BREAKDOWN OF SKIN (HCC): ICD-10-CM

## 2023-06-21 DIAGNOSIS — E11.42 DIABETIC POLYNEUROPATHY ASSOCIATED WITH TYPE 2 DIABETES MELLITUS (HCC): ICD-10-CM

## 2023-06-21 PROCEDURE — 93923 UPR/LXTR ART STDY 3+ LVLS: CPT

## 2023-06-21 PROCEDURE — 93925 LOWER EXTREMITY STUDY: CPT

## 2023-06-21 PROCEDURE — 93925 LOWER EXTREMITY STUDY: CPT | Performed by: SURGERY

## 2023-06-21 PROCEDURE — 93922 UPR/L XTREMITY ART 2 LEVELS: CPT | Performed by: SURGERY

## 2023-06-21 NOTE — TELEPHONE ENCOUNTER
Spoke with wife  Pt is at dialysis presently  He is having no issues with the Entresto  His Bp has been running WNL

## 2023-06-23 ENCOUNTER — HOSPITAL ENCOUNTER (EMERGENCY)
Facility: HOSPITAL | Age: 63
Discharge: HOME | End: 2023-06-23
Attending: EMERGENCY MEDICINE | Admitting: EMERGENCY MEDICINE
Payer: MEDICARE

## 2023-06-23 VITALS
SYSTOLIC BLOOD PRESSURE: 128 MMHG | TEMPERATURE: 98 F | RESPIRATION RATE: 20 BRPM | DIASTOLIC BLOOD PRESSURE: 60 MMHG | HEART RATE: 68 BPM | OXYGEN SATURATION: 96 %

## 2023-06-23 DIAGNOSIS — D17.23 LIPOMA OF RIGHT LOWER EXTREMITY: Primary | ICD-10-CM

## 2023-06-23 PROCEDURE — 99285 EMERGENCY DEPT VISIT HI MDM: CPT

## 2023-06-23 PROCEDURE — 99284 EMERGENCY DEPT VISIT MOD MDM: CPT | Performed by: EMERGENCY MEDICINE

## 2023-06-23 NOTE — ED PROVIDER NOTES
History  Chief Complaint   Patient presents with   • Hip Pain     Pt states he has "a pimple" on his right hip that started hurting today. Pt is actively wearing a [place]. Pt states he does not have an chest pain or SOB at this time. Patient is a 80-year-old male past medical history ESRD, CHF with Lifepak, diabetes, hypertension, CVA presenting for evaluation of a mass to the right hip. Patient's wife provides additional history states that it was noticed several days ago. When he went to his dialysis appointment today which she does every Monday Wednesday Friday, he brought it up to the staff at the dialysis center and they said that he should seek evaluation in the emergency department to get checked out. Denies any pain to the hip or difficulty ambulating. Denies chest pain shortness of breath palpitations diaphoresis nausea vomiting lower extremity edema or any other complaints at this time. Prior to Admission Medications   Prescriptions Last Dose Informant Patient Reported? Taking? Blood Glucose Monitoring Suppl (True Metrix Meter) w/Device KIT  Spouse/Significant Other, Child No No   Sig: Use to test blood sugars 3 times daily   Blood Pressure Monitoring (BLOOD PRESSURE CUFF) MISC  Spouse/Significant Other, Child No No   Sig: Use to check blood pressure before taking blood pressure medication and 1 hour after and follow instructions provided in discharge instructions based on the readings.    D3-50 1.25 MG (16953 UT) capsule  Spouse/Significant Other, Child No No   Sig: TAKE 1 CAPSULE BY MOUTH ONE TIME PER WEEK   ONETOUCH DELICA LANCETS 43J MISC  Spouse/Significant Other, Child No No   Sig: by Does not apply route 3 (three) times a day   Sevelamer Carbonate 2.4 g PACK  Spouse/Significant Other, Child Yes No   Sig: VIGOROUSLY MIX CONTENTS OF 1 PACKET IN WATER (IT WILL NOT DISSOLVE) AND DRINK 3 TIMES DAILY WITH MEALS   aspirin (ECOTRIN LOW STRENGTH) 81 mg EC tablet  Spouse/Significant Other, Child Yes No   Sig: Take 81 mg by mouth daily Resume on 8/14     atorvastatin (LIPITOR) 40 mg tablet  Spouse/Significant Other, Child No No   Sig: TAKE 1 TABLET BY MOUTH EVERY DAY WITH DINNER   collagenase (SANTYL) ointment  Child, Spouse/Significant Other No No   Sig: Apply topically daily   divalproex sodium (DEPAKOTE SPRINKLE) 125 MG capsule   No No   Sig: TAKE 2 CAPSULES (250 MG TOTAL) BY MOUTH EVERY 12 (TWELVE) HOURS   glucose blood (True Metrix Blood Glucose Test) test strip  Spouse/Significant Other, Child No No   Sig: Use 1 each 3 (three) times a day Use as instructed   metoprolol succinate (TOPROL-XL) 50 mg 24 hr tablet  Spouse/Significant Other, Child No No   Sig: Take 1 tablet (50 mg total) by mouth 2 (two) times a day   midodrine (PROAMATINE) 2.5 mg tablet   No No   Sig: Take 1 tablet (2.5 mg total) by mouth as needed (for SBP<90 on dialysis days)   prasugrel (EFFIENT) tablet  Spouse/Significant Other, Child No No   Sig: Take 1 tablet (5 mg total) by mouth daily   sacubitril-valsartan (Entresto) 24-26 MG TABS   No No   Sig: Take 1 tablet by mouth in the morning      Facility-Administered Medications: None       Past Medical History:   Diagnosis Date   • Cerebrovascular accident (CVA) due to thrombosis of left middle cerebral artery ([address]) 07/29/2018   • Chronic kidney disease    • Diabetes mellitus ([address])    • Dialysis patient [facility])     M-W-F   • GERD (gastroesophageal reflux disease)    • Hypercholesteremia    • Hyperlipidemia    • Hypertension    • 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   • Neuropathy    • Obesity    • Osteomyelitis ([address])     last assessed 11/4/16   • PVC's (premature ventricular contractions)     sees cardiology Dr. Tash camargo   • Stroke [facility])     last weeof July 2018 [facility]   • TIA (transient ischemic attack) 10/28/2018       Past Surgical History:   Procedure Laterality Date   • ABDOMINAL SURGERY     • CARDIAC CATHETERIZATION N/A 5/2/2022 Procedure: Cardiac Coronary Angiogram;  Surgeon: Ligia Graves MD;  [facility]ARD[facility]; Service: Cardiology   • CARDIAC CATHETERIZATION N/A 5/2/2022    Procedure: Cardiac pci;  Surgeon: Ligia Graves MD;  [facility]: [facility] CARD[facility]; Service: Cardiology   • CARDIAC CATHETERIZATION  2/1/2023    Procedure: Cardiac catheterization;  Surgeon: Ligia Graves MD;  [facility]: [facility] CARDIAC [facility]; Service: Cardiology   • CARDIAC CATHETERIZATION N/A 2/1/2023    Procedure: Cardiac pci;  Surgeon: Ligia Graves MD;  [facility]: [facility] CARD[facility]; Service: Cardiology   • CARDIAC CATHETERIZATION N/A 2/1/2023    Procedure: Cardiac Coronary Angiogram;  Surgeon: Ligia Graves MD;  [facility]: [facility]ARD[facility]; Service: Cardiology   • CARDIAC CATHETERIZATION N/A 2/1/2023    Procedure: Cardiac other-IVUS;  Surgeon: Ligia Graves MD;  Location: [facility] CARD[facility]; Service: Cardiology   • CHOLECYSTECTOMY      Percutaneous   • COLONOSCOPY     • CYSTOSCOPY     • OTHER SURGICAL HISTORY      "stimulator to control bowel movements"   • VA ESOPHAGOGASTRODUODENOSCOPY TRANSORAL DIAGNOSTIC N/A 9/27/2016    Procedure: ESOPHAGOGASTRODUODENOSCOPY (EGD); Surgeon: Uvaldo Jeffers MD;  Location: [facility] GI LAB;   Service: Gastroenterology   • VA LAPAROSCOPY SURG CHOLECYSTECTOMY N/A 2/29/2016    Procedure: LAPAROSCOPIC CHOLECYSTECTOMY ;  Surgeon: Migue Mccallum DO;  Location: [facility] Main OR;  Service: General   • ROTATOR CUFF REPAIR Right    • TOE AMPUTATION Right 10/28/2016    Procedure: 3RD TOE AMPUTATION ;  Surgeon: Teetee Barnes DPM;  Location: [facility] Main OR;  Service:        Family History   Problem Relation Age of Onset   • Leukemia Mother    • Liver disease Mother    • Lung cancer Mother         heavy smoker - 3 ppd   • Heart disease Father    • Liver disease Father    • Diabetes type I Father    • Multiple myeloma Sister    • Breast cancer Sister    • Urolithiasis Family    • Alcohol abuse Neg Hx    • Depression Neg Hx    • Drug abuse Neg Hx    • Substance Abuse Neg Hx    • Mental illness Neg Hx      I have reviewed and agree with the history as documented. E-Cigarette/Vaping   • E-Cigarette Use Never User      E-Cigarette/Vaping Substances   • Nicotine No    • THC No    • CBD No    • Flavoring No    • Other No    • Unknown No      Social History     Tobacco Use   • Smoking status: Never   • Smokeless tobacco: Never   Vaping Use   • Vaping Use: Never used   Substance Use Topics   • Alcohol use: Not Currently   • Drug use: No        Review of Systems   Constitutional: Negative for chills and fever. Respiratory: Negative for shortness of breath. Cardiovascular: Negative for chest pain. Gastrointestinal: Negative for abdominal pain, nausea and vomiting. Musculoskeletal: Negative for back pain and neck pain. Growth to right hip   Skin: Negative for rash. Neurological: Negative for headaches. All other systems reviewed and are negative. Physical Exam  ED Triage Vitals [06/23/23 1404]   Temperature Pulse Respirations Blood Pressure SpO2   98 °F (36.7 °C) 85 18 105/52 100 %      Temp src Heart Rate Source Patient Position - Orthostatic VS BP Location FiO2 (%)   -- Monitor Sitting Left arm --      Pain Score       5             Orthostatic Vital Signs  Vitals:    06/23/23 1404 06/23/23 1714   BP: 105/52 128/60   Pulse: 85 68   Patient Position - Orthostatic VS: Sitting Sitting       Physical Exam  Vitals and nursing note reviewed. Constitutional:       General: He is not in acute distress. Appearance: Normal appearance. He is obese. He is not ill-appearing or diaphoretic. HENT:      Head: Normocephalic and atraumatic. Mouth/Throat:      Mouth: Mucous membranes are moist.   Eyes:      Extraocular Movements: Extraocular movements intact. Cardiovascular:      Rate and Rhythm: Normal rate and regular rhythm. Pulmonary:      Effort: Pulmonary effort is normal.      Breath sounds: Normal breath sounds.    Abdominal: Palpations: Abdomen is soft. Tenderness: There is no abdominal tenderness. Musculoskeletal:         General: Normal range of motion. Cervical back: Normal range of motion. Comments: What appears to be a 2 x 4 cm mass to the lateral aspect of the right hip. No overlying erythema warmth tenderness to palpation. Does not feel fluctuant, feels consistent with lipoma. Patient has full range of motion at the hip without pain   Skin:     General: Skin is warm and dry. Neurological:      General: No focal deficit present. Mental Status: He is alert and oriented to person, place, and time.          ED Medications  Medications - No data to display    Diagnostic Studies  Results Reviewed     None                 No orders to display         Procedures  POC MSK/Soft Tissue US    Date/Time: 6/23/2023 5:33 PM    Performed by: Viann Nageotte, DO  Authorized by: Viann Nageotte, DO    Patient location:  ED  Other Assisting Provider: Yes (comment) (Dr. Estrella Fontanez)    Procedure:     Performed: soft tissue ultrasound    Procedure details:     Exam Type:  Diagnostic    Longitudinal view:  Obtained    Transverse view:  Obtained  Soft tissue ultrasound:     Soft tissue indications: swelling      Anatomic location:  Lower extremity    Soft tissue findings comment:  Spiculated appearance c/w lipoma  Interpretation:     Soft tissue impressions comment:  C/w lipoma  POC AAA US    Date/Time: 6/23/2023 5:35 PM    Performed by: Viann Nageotte, DO  Authorized by: Viann Nageotte, DO    Patient location:  ED  Performing Provider:  Resident  Other Assisting Provider: Yes (comment) (Dr. Estrella Fontanez)    Procedure details:     Exam Type:  Educational    Image quality: non-diagnostic      Image availability:  Images available in PACS  Findings:     Abdominal Aorta Findings: limited visualization of suprarenal aorta and limited visualization of infrarenal aorta    Interpretation:     Aortic ultrasound impression: indeterminate            ED Course                             SBIRT 22yo+    Flowsheet Row Most Recent Value   Initial Alcohol Screen: US AUDIT-C     1. How often do you have a drink containing alcohol? 0 Filed at: 06/23/2023 1406   2. How many drinks containing alcohol do you have on a typical day you are drinking? 0 Filed at: 06/23/2023 1406   3a. Male UNDER 65: How often do you have five or more drinks on one occasion? 0 Filed at: 06/23/2023 1406   3b. FEMALE Any Age, or MALE 65+: How often do you have 4 or more drinks on one occassion? 0 Filed at: 06/23/2023 1406   Audit-C Score 0 Filed at: 06/23/2023 1406   XIN: How many times in the past year have you. .. Used an illegal drug or used a prescription medication for non-medical reasons? Never Filed at: 06/23/2023 1406                Medical Decision Making  Patient is a 77-year-old male presenting for evaluation of mass to right hip    Patient has what appears to be a lipoma Will ultrasound to confirm there is no fluid or it is not a cystic structure. POCUS confirms appears to be lipoma. Patient has no other complaints is hemodynamically stable completed dialysis today. No further work-up indicated. Patient is cleared for discharge outpatient follow-up with his PCP. Return precautions given patient verbalized understanding          Disposition  Final diagnoses:   Lipoma of right lower extremity     Time reflects when diagnosis was documented in both MDM as applicable and the Disposition within this note     Time User Action Codes Description Comment    6/23/2023  5:30 PM Ottoniel Adjutant Add [C75.76] Lipoma of right lower extremity       ED Disposition     ED Disposition   Discharge    Condition   Stable    Date/Time   Fri Jun 23, 2023  5:31 PM    Comment   [address] discharge to home/self care. Follow-up Information     Follow up With Specialties Details Why [address], DO Internal Medicine Call in 2 days  [address] [place] [address]  [phone]            Discharge Medication List as of 6/23/2023  5:32 PM      CONTINUE these medications which have NOT CHANGED    Details   aspirin (ECOTRIN LOW STRENGTH) 81 mg EC tablet Take 81 mg by mouth daily Resume on 8/14  , Historical Med      atorvastatin (LIPITOR) 40 mg tablet TAKE 1 TABLET BY MOUTH EVERY DAY WITH DINNER, Normal      Blood Glucose Monitoring Suppl (True Metrix Meter) w/Device KIT Use to test blood sugars 3 times daily, Normal      Blood Pressure Monitoring (BLOOD PRESSURE CUFF) MISC Use to check blood pressure before taking blood pressure medication and 1 hour after and follow instructions provided in discharge instructions based on the readings. , Print      collagenase (SANTYL) ointment Apply topically daily, Starting Tue 5/16/2023, Normal      D3-50 1.25 MG (53556 UT) capsule TAKE 1 CAPSULE BY MOUTH ONE TIME PER WEEK, Normal      divalproex sodium (DEPAKOTE SPRINKLE) 125 MG capsule TAKE 2 CAPSULES (250 MG TOTAL) BY MOUTH EVERY 12 (TWELVE) HOURS, Starting Tue 6/20/2023, Normal      glucose blood (True Metrix Blood Glucose Test) test strip Use 1 each 3 (three) times a day Use as instructed, Starting Wed 3/15/2023, Normal      metoprolol succinate (TOPROL-XL) 50 mg 24 hr tablet Take 1 tablet (50 mg total) by mouth 2 (two) times a day, Starting Fri 3/17/2023, Normal      midodrine (PROAMATINE) 2.5 mg tablet Take 1 tablet (2.5 mg total) by mouth as needed (for SBP<90 on dialysis days), Starting Mon 6/5/2023, Normal      ONETOUCH DELICA LANCETS 56C MISC by Does not apply route 3 (three) times a day, Starting Tue 11/27/2018, Normal      prasugrel (EFFIENT) tablet Take 1 tablet (5 mg total) by mouth daily, Starting Tue 2/14/2023, Normal      sacubitril-valsartan (Entresto) 24-26 MG TABS Take 1 tablet by mouth in the morning, Starting Mon 6/5/2023, Normal      Sevelamer Carbonate 2.4 g PACK VIGOROUSLY MIX CONTENTS OF 1 PACKET IN WATER (IT WILL NOT DISSOLVE) AND DRINK 3 TIMES DAILY WITH MEALS, Historical Med               PDMP Review       Value Time User    PDMP Reviewed  Yes 11/12/2022  6:19 AM Samantha Houston MD           ED Provider  Attending physically available and evaluated Amanda Koo I managed the patient along with the ED Attending.     Electronically Signed by         Sergey Posada DO  06/24/23 5536

## 2023-06-23 NOTE — DISCHARGE INSTRUCTIONS
You were seen and evaluated in the emergency department for mass on right lower extremity. Likely lipoma, no fluid seen on ultrasound. Please continue to monitor, if you develop difficulty or pain with ambulation please return to the emergency department further evaluation management. We will send referral to general surgery for further evaluation. Please follow-up with them. Please follow-up with your PCP within 48 hours.

## 2023-06-23 NOTE — ED ATTENDING ATTESTATION
6/23/2023  I, Kevin Lindsey MD, saw and evaluated the patient. I have discussed the patient with the resident/non-physician practitioner and agree with the resident's/non-physician practitioner's findings, Plan of Care, and MDM as documented in the resident's/non-physician practitioner's note, except where noted. All available labs and Radiology studies were reviewed. I was present for key portions of any procedure(s) performed by the resident/non-physician practitioner and I was immediately available to provide assistance. At this point I agree with the current assessment done in the Emergency Department. I have conducted an independent evaluation of this patient a history and physical is as follows:    ED Course         Critical Care Time  Procedures    62 yo male with hx of chf, esrd on hd, noted a growth on right hip that has been for last three days. No pain, no increase in size, no change in appearance. Pt got dialysis today. No fever, no chills, no pain with ambulation. Vss, afebrile, lungs cta, rrr, abdomen soft nontender, right hip with well circumscribed mass lipoma consistency. No tenderness to hip. Likely lipoma.  Surgery referral.

## 2023-06-26 ENCOUNTER — VBI (OUTPATIENT)
Dept: ADMINISTRATIVE | Facility: OTHER | Age: 63
End: 2023-06-26

## 2023-06-26 NOTE — TELEPHONE ENCOUNTER
Isela Ards    ED Visit Information     Ed visit date: 6/23/2023  Diagnosis Description: Lipoma of right lower extremity  In Network? Yes [facility]  Discharge status: Home  Discharged with meds ? No  Number of ED visits to date: 2  ED Severity:3     Outreach Information    Outreach successful: Yes 1  Date letter mailed:0  Date Errol Jacobson    Follow up appointment with pcp: no declined  Transportation issues ? No  Has family support    Value [employer] type: 7 Day Outreach  [facility] PCP: Yes  Transportation: Friend/Family Transport  Called PCP first?: No  Feels able to call PCP for urgent problems ?: Yes  Understands what emergencies can be handled by PCP ?: Yes  Ever any problems getting appointment with PCP for minor emergency/urgency problems?: No  Practice Contacted Patient ?: No  Pt had ED follow up with pcp/staff ?: No    Seen for follow-up out of network ?: No  Reason Patient went to ED instead of Urgent Care or PCP?: Perceived Severity of Illness  Urgent care Education?: No  06/26/2023 11:30 AM EDT by Nicolas Hong - Call Complete /    There was a Personal communication with Patient's family member wife Quinn Fredrick EC regarding recent ED visit  Patient's family member declined a follow up with PCP  Patient's family member is aware of nearest [facility] urgent care facility, education not given

## 2023-06-27 ENCOUNTER — TELEPHONE (OUTPATIENT)
Dept: PODIATRY | Facility: CLINIC | Age: 63
End: 2023-06-27

## 2023-06-27 DIAGNOSIS — E11.42 DIABETIC POLYNEUROPATHY ASSOCIATED WITH TYPE 2 DIABETES MELLITUS (HCC): ICD-10-CM

## 2023-06-27 DIAGNOSIS — L97.421 ULCER OF LEFT HEEL, LIMITED TO BREAKDOWN OF SKIN (HCC): Primary | ICD-10-CM

## 2023-06-27 DIAGNOSIS — L97.421 ULCER OF LEFT HEEL, LIMITED TO BREAKDOWN OF SKIN (HCC): ICD-10-CM

## 2023-06-27 DIAGNOSIS — I73.9 PERIPHERAL ARTERIAL DISEASE (HCC): ICD-10-CM

## 2023-06-27 NOTE — TELEPHONE ENCOUNTER
Caller: Patient son    Doctor:Rnajit      Reason for call: Returning Dannielle's call    Call back#:[phone]

## 2023-06-27 NOTE — TELEPHONE ENCOUNTER
Caller: Maryellen Short    Doctor/Office: Dr Alexandra Macdonald    #: [phone]    Escalation: Medication Patient is out of medication for his wound  Would like a call back  Thank you

## 2023-06-30 DIAGNOSIS — I10 PRIMARY HYPERTENSION: ICD-10-CM

## 2023-06-30 DIAGNOSIS — Z99.2 ESRD ON DIALYSIS (HCC): ICD-10-CM

## 2023-06-30 DIAGNOSIS — I25.10 CAD, MULTIPLE VESSEL: ICD-10-CM

## 2023-06-30 DIAGNOSIS — Z99.2 TYPE 2 DIABETES MELLITUS WITH CHRONIC KIDNEY DISEASE ON CHRONIC DIALYSIS, WITHOUT LONG-TERM CURRENT USE OF INSULIN (HCC): Primary | ICD-10-CM

## 2023-06-30 DIAGNOSIS — N18.6 ESRD ON DIALYSIS (HCC): ICD-10-CM

## 2023-06-30 DIAGNOSIS — I21.4 TYPE 1 NON-ST ELEVATION MYOCARDIAL INFARCTION (NSTEMI) (HCC): ICD-10-CM

## 2023-06-30 DIAGNOSIS — E11.311 DIABETIC MACULAR EDEMA (HCC): ICD-10-CM

## 2023-06-30 DIAGNOSIS — S91.332A PENETRATING FOOT WOUND, LEFT, INITIAL ENCOUNTER: ICD-10-CM

## 2023-06-30 DIAGNOSIS — N18.6 TYPE 2 DIABETES MELLITUS WITH CHRONIC KIDNEY DISEASE ON CHRONIC DIALYSIS, WITHOUT LONG-TERM CURRENT USE OF INSULIN (HCC): Primary | ICD-10-CM

## 2023-06-30 DIAGNOSIS — E11.22 TYPE 2 DIABETES MELLITUS WITH CHRONIC KIDNEY DISEASE ON CHRONIC DIALYSIS, WITHOUT LONG-TERM CURRENT USE OF INSULIN (HCC): Primary | ICD-10-CM

## 2023-06-30 DIAGNOSIS — E11.42 DIABETIC POLYNEUROPATHY ASSOCIATED WITH TYPE 2 DIABETES MELLITUS (HCC): ICD-10-CM

## 2023-07-05 ENCOUNTER — CONSULT (OUTPATIENT)
Dept: SURGERY | Facility: CLINIC | Age: 63
End: 2023-07-05
Payer: MEDICARE

## 2023-07-05 DIAGNOSIS — D17.23 LIPOMA OF RIGHT LOWER EXTREMITY: ICD-10-CM

## 2023-07-05 PROCEDURE — 99203 OFFICE O/P NEW LOW 30 MIN: CPT | Performed by: SURGERY

## 2023-07-05 NOTE — PROGRESS NOTES
Assessment/Plan:    Diagnoses and all orders for this visit:    Lipoma of right lower extremity  -     Ambulatory Referral to General Surgery    Clinically appears to be a very subtle lipoma over the right greater trochanter. No skin changes. Given  multiple medical problems, would simply observe. Discussed with patient, wife, and son in the room. Subjective:      Patient ID: Deng Garcia is a 61 y.o. male. Patient presents for evaluation of a lump on his right hip.   noticed the lump on 6/23 and went to the Emergency Room. A bedside soft tissue ultrasound was performed in the Emergency Room. ER staff report just a lipoma no formal radiologic imaging. Denies pain. Has been dealing with renal failure on dialysis. He is on the transplant list.  Is also been having issues with heart failure. He did undergo cardiac stent placement therapy of this year and he is on Effient. Denies pain over the right hip/soft tissue area. In fact it is only his wife that pointed out this issue. The following portions of the patient's history were reviewed and updated as appropriate:     He  has a past medical history of Cerebrovascular accident (CVA) due to thrombosis of left middle cerebral artery ([address]) (07/29/2018), Chronic kidney disease, Diabetes mellitus ([address]), Dialysis patient ([address]), GERD (gastroesophageal reflux disease), Hypercholesteremia, Hyperlipidemia, Hypertension, Infectious viral hepatitis, Neuropathy, Obesity, Osteomyelitis ([address]), PVC's (premature ventricular contractions), Stroke ([address]), and TIA (transient ischemic attack) (10/28/2018). He  has a past surgical history that includes Rotator cuff repair (Right); Abdominal surgery; Other surgical history; Cholecystectomy; Toe amputation (Right, 10/28/2016); pr esophagogastroduodenoscopy transoral diagnostic (N/A, 9/27/2016); pr laparoscopy surg cholecystectomy (N/A, 2/29/2016); Cystoscopy;  Colonoscopy; Cardiac catheterization (N/A, 5/2/2022); Cardiac catheterization (N/A, 5/2/2022); Cardiac catheterization (2/1/2023); Cardiac catheterization (N/A, 2/1/2023); Cardiac catheterization (N/A, 2/1/2023); and Cardiac catheterization (N/A, 2/1/2023). His family history includes Breast cancer in his sister; Diabetes type I in his father; Heart disease in his father; Leukemia in his mother; Liver disease in his father and mother; Lung cancer in his mother; Multiple myeloma in his sister; Urolithiasis in his family. He  reports that he has never smoked. He has never used smokeless tobacco. He reports that he does not currently use alcohol. He reports that he does not use drugs. Current Outpatient Medications   Medication Sig Dispense Refill   • aspirin (ECOTRIN LOW STRENGTH) 81 mg EC tablet Take 81 mg by mouth daily Resume on 8/14       • atorvastatin (LIPITOR) 40 mg tablet TAKE 1 TABLET BY MOUTH EVERY DAY WITH DINNER 90 tablet 1   • Blood Glucose Monitoring Suppl (True Metrix Meter) w/Device KIT Use to test blood sugars 3 times daily 1 kit 0   • Blood Pressure Monitoring (BLOOD PRESSURE CUFF) MISC Use to check blood pressure before taking blood pressure medication and 1 hour after and follow instructions provided in discharge instructions based on the readings.  1 each 0   • collagenase (SANTYL) ointment Apply topically daily 90 g 0   • D3-50 1.25 MG (72398 UT) capsule TAKE 1 CAPSULE BY MOUTH ONE TIME PER WEEK 12 capsule 2   • divalproex sodium (DEPAKOTE SPRINKLE) 125 MG capsule TAKE 2 CAPSULES (250 MG TOTAL) BY MOUTH EVERY 12 (TWELVE) HOURS 360 capsule 1   • glucose blood (True Metrix Blood Glucose Test) test strip Use 1 each 3 (three) times a day Use as instructed 300 strip 1   • metoprolol succinate (TOPROL-XL) 50 mg 24 hr tablet Take 1 tablet (50 mg total) by mouth 2 (two) times a day 180 tablet 3   • midodrine (PROAMATINE) 2.5 mg tablet Take 1 tablet (2.5 mg total) by mouth as needed (for SBP<90 on dialysis days) 30 tablet 0   • ONETOUCH DELICA LANCETS 85L MISC by Does not apply route 3 (three) times a day 270 each 1   • prasugrel (EFFIENT) tablet Take 1 tablet (5 mg total) by mouth daily 90 tablet 3   • sacubitril-valsartan (Entresto) 24-26 MG TABS Take 1 tablet by mouth in the morning 30 tablet 0   • Sevelamer Carbonate 2.4 g PACK VIGOROUSLY MIX CONTENTS OF 1 PACKET IN WATER (IT WILL NOT DISSOLVE) AND DRINK 3 TIMES DAILY WITH MEALS       No current facility-administered medications for this visit. He has No Known Allergies. .    Review of Systems      Objective: There were no vitals taken for this visit. Physical Exam  Constitutional:       Comments: Frail-appearing   Skin:     General: Skin is warm and dry. Comments: Vague lipoma over the right greater trochanteric area. It is soft. There is no skin changes.

## 2023-07-06 ENCOUNTER — CONSULT (OUTPATIENT)
Dept: VASCULAR SURGERY | Facility: CLINIC | Age: 63
End: 2023-07-06
Payer: MEDICARE

## 2023-07-06 ENCOUNTER — PATIENT OUTREACH (OUTPATIENT)
Dept: INTERNAL MEDICINE CLINIC | Facility: CLINIC | Age: 63
End: 2023-07-06

## 2023-07-06 VITALS
OXYGEN SATURATION: 97 % | SYSTOLIC BLOOD PRESSURE: 112 MMHG | HEART RATE: 74 BPM | WEIGHT: 210.2 LBS | DIASTOLIC BLOOD PRESSURE: 64 MMHG | HEIGHT: 69 IN | BODY MASS INDEX: 31.13 KG/M2

## 2023-07-06 DIAGNOSIS — Z74.2 ASSISTANCE NEEDED AT HOME: Primary | ICD-10-CM

## 2023-07-06 DIAGNOSIS — L97.421 ULCER OF LEFT HEEL, LIMITED TO BREAKDOWN OF SKIN (HCC): Primary | ICD-10-CM

## 2023-07-06 PROCEDURE — 99203 OFFICE O/P NEW LOW 30 MIN: CPT | Performed by: NURSE PRACTITIONER

## 2023-07-06 NOTE — PATIENT INSTRUCTIONS
Left heel ulceration. Change wound care to Betadine paint daily. Continue to offload the foot.   Follow-up in the office in 3 to 4 weeks to recheck healing potential and if not healing we will proceed with angiogram procedure

## 2023-07-06 NOTE — PROGRESS NOTES
Request received from PCP for Outpatient Social Work Care Manager (OP [facility]) to outreach patient/patient's wife to discuss Waiver Services for in-home support. Chart reviewed; OP SWCM previously spoke with patient's family about Waiver Services and application was started in July of 2021. Another Physician Certification Form was completed on 2/14 and PCP indicated patient is Nursing Facility Clinically Eligible (NFCE). Call placed to further assess. Spoke with patient's son Lawyer Sousa who states patient is doing much better and they withdrew his Waiver application one week after starting the process.  Lars states that he and patient's wife are able to manage patient's daily needs and denied need for additional in-home support at this time.  Seat denied any additional resource needs. Encouraged him to outreach OP SWCM or PCP office with any future needs. Lawyer Sousa agreed. Case closed.

## 2023-07-06 NOTE — PROGRESS NOTES
Assessment/Plan:  51-year-old male with HTN, HLD, DM, CAD s/p PCI, Aortic stenosis, stroke, ESRD on HD via LUE AVF. Patient referred by podiatry for evaluation of left heel ulceration X 2 months    -Small left heel eschar approximately 1cm x 1 cm  -LEAD 6/21/23 showed right lower extremity diffuse femoral-popliteal and tibial peroneal disease without focal stenosis, right PATSY noncompressible/119/56 and left lower extremity diffuse femoral-popliteal and tibial peroneal occlusive disease with a left and 50% mid popliteal stenosis, left PATSY is noncompressible/69/23  -Discussed at length with patient and family angiogram with attempted tibial intervention versus trial of wound care  -Change wound care to Betadine paint daily  -Continue to offload the foot  -Continue ASA and atorvastatin. Also on Prasugrel for CAD  -Follow-up in the office in 3 to 4 weeks to recheck healing potential and if not healing we will proceed with angiogram procedure      Subjective:      Patient ID: Charanjit Miller is a 61 y.o. male. New patient referred by Jacqueline Wright DPM for nonhealing ulceration on L heel. Pt had an ALEKSANDER performed on 6/21/23. Pt has had ulcer for about 2 months, accompanied by pain. He denies coldness, numbness, tingling in BLE. L heel wound is dry. Pt ambulates with difficulty due to L heel pain and denies claudication symptoms. He is taking ASA81, Prasugrel and Atorvastatin. He is not a smoker. HPI  51-year-old male with HTN, HLD, DM, CAD s/p PCI, Aortic stenosis, stroke, ESRD on HD via LUE AVF. Patient referred by podiatry for evaluation of left heel ulceration X 2 months  He is complaining of left heel pain. No claudication symptoms. He is following with podiatry closely.   LEAD 6/21/23 showed right lower extremity diffuse femoral-popliteal and tibial peroneal disease without focal stenosis, right PATSY noncompressible/119/56 and left lower extremity diffuse femoral-popliteal and tibial peroneal occlusive disease with a left and 50% mid popliteal stenosis, left PATSY is noncompressible/69/23  The following portions of the patient's history were reviewed and updated as appropriate: allergies, current medications, past family history, past medical history, past social history, past surgical history and problem list.  ROS reviewed     Review of Systems   Constitutional: Negative. HENT: Negative. Eyes: Negative. Respiratory: Negative. Cardiovascular: Negative. Gastrointestinal: Negative. Endocrine: Negative. Genitourinary: Negative. Musculoskeletal: Negative. Skin:  Positive for wound (L heel). Allergic/Immunologic: Negative. Neurological: Negative. Hematological: Negative. Psychiatric/Behavioral: Negative. Objective:  I have reviewed and made appropriate changes to the review of systems input by the medical assistant.     Vitals:    07/06/23 1002   BP: 112/64   BP Location: Right arm   Patient Position: Sitting   Cuff Size: Standard   Pulse: 74   SpO2: 97%   Weight: 95.3 kg (210 lb 3.2 oz)   Height: 5' 9" (1.753 m)       Patient Active Problem List   Diagnosis    Type 2 diabetes mellitus with chronic kidney disease on chronic dialysis, without long-term current use of insulin (HCC)    Dizziness    Mixed hyperlipidemia    Nephrotic range proteinuria    Elevated alkaline phosphatase level    Diabetic macular edema (HCC)    GERD (gastroesophageal reflux disease)    Other constipation    Abnormal EEG    History of stroke    Anxiety associated with depression    Urge incontinence    S/P arteriovenous (AV) fistula creation    Pre-kidney transplant, listed    Anemia    History of 2019 novel coronavirus disease (COVID-19)    ESRD on dialysis ([address])    Penetrating foot wound, left, initial encounter    Emotional lability    Primary hypertension    Generalized weakness    Chest pain    Electrolyte abnormality    CAD, multiple vessel    SOB (shortness of breath)    Left arm swelling    Type 1 non-ST elevation myocardial infarction (NSTEMI) s/p ADE to in-stent restenosis of mid LAD    Ischemic cardiomyopathy    Moderate AS (aortic stenosis)    Mood disorder (HCC)    Diabetic polyneuropathy associated with type 2 diabetes mellitus (HCC)    Absence of toe of right foot (HCC)    Hammer toes of both feet    Elevated troponin    Presence of external cardiac defibrillator    Ulcer of left heel, limited to breakdown of skin [facility])       Past Surgical History:   Procedure Laterality Date    ABDOMINAL SURGERY      CARDIAC CATHETERIZATION N/A 05/02/2022    Procedure: Cardiac Coronary Angiogram;  Surgeon: Sophy Garcia MD;  [facility]; Service: Cardiology    CARDIAC CATHETERIZATION N/A 05/02/2022    Procedure: Cardiac pci;  Surgeon: Sophy Garcia MD;  [facility]; Service: Cardiology    CARDIAC CATHETERIZATION  02/01/2023    Procedure: Cardiac catheterization;  Surgeon: Sophy Garcia MD;  [facility]; Service: Cardiology    CARDIAC CATHETERIZATION N/A 02/01/2023    Procedure: Cardiac pci;  Surgeon: Sophy Garcia MD;  [facility]; Service: Cardiology    CARDIAC CATHETERIZATION N/A 02/01/2023    Procedure: Cardiac Coronary Angiogram;  Surgeon: Sophy Garcia MD;  [facility]: [facility]; Service: Cardiology    CARDIAC CATHETERIZATION N/A 02/01/2023    Procedure: Cardiac other-IVUS;  Surgeon: Sophy Garcia MD;  [facility]: [facility]; Service: Cardiology    CHOLECYSTECTOMY      Percutaneous    COLONOSCOPY      CYSTOSCOPY      IR LOWER EXTREMITY ANGIOGRAM  9/29/2023    OTHER SURGICAL HISTORY      "stimulator to control bowel movements"    KS ESOPHAGOGASTRODUODENOSCOPY TRANSORAL DIAGNOSTIC N/A 09/27/2016    Procedure: ESOPHAGOGASTRODUODENOSCOPY (EGD); Surgeon: Irma Helton MD;  Location: [facility] GI LAB;   Service: Gastroenterology    KS LAPAROSCOPY SURG CHOLECYSTECTOMY N/A 02/29/2016    Procedure: LAPAROSCOPIC CHOLECYSTECTOMY ;  Surgeon: Zev Barraza DO;  Location: [facility] Main OR;  Service: General    OK Tanisha Lamas 3RD+ ORD SLCTV ABDL PEL/LXTR [address] Left 9/29/2023    Procedure: Left leg angiogram with intervention;  Surgeon: Veronica Gordon MD;  Location: [facility] MAIN [facility];  Service: Vascular    ROTATOR CUFF REPAIR Right     SPINAL CORD STIMULATOR IMPLANT      "Medtronic interstim model # 9060- in lower back to control bowel movements-currently turned off-battery is dead"    TOE AMPUTATION Right 10/28/2016    Procedure: 3RD TOE AMPUTATION ;  Surgeon: Jamshid Tripathi DPM;  Location: [facility];  Service:        Family History   Problem Relation Age of Onset    Leukemia Mother     Liver disease Mother     Lung cancer Mother         heavy smoker - 3 ppd    Heart disease Father     Liver disease Father     Diabetes type I Father     Multiple myeloma Sister     Breast cancer Sister     Urolithiasis Family     Alcohol abuse Neg Hx     Depression Neg Hx     Drug abuse Neg Hx     Substance Abuse Neg Hx     Mental illness Neg Hx        Social History     Socioeconomic History    Marital status: /Civil Union     Spouse name: Not on file    Number of children: Not on file    Years of education: Not on file    Highest education level: Not asked   Occupational History    Occupation: disabled   Tobacco Use    Smoking status: Never    Smokeless tobacco: Never   Vaping Use    Vaping Use: Never used   Substance and Sexual Activity    Alcohol use: Never    Drug use: No    Sexual activity: Not on file     Comment: defer   Other Topics Concern    Not on file   Social History Narrative    Daily caffeine consumption 2-3 servings a day     Social Determinants of Health     Financial Resource Strain: Unknown (7/13/2023)    Overall Financial Resource Strain (CARDIA)     Difficulty of Paying Living Expenses: Patient refused   Food Insecurity: No Food Insecurity (2/1/2023)    Hunger Vital Sign     Worried About Running Out of Food in the Last Year: Never true Ran Out of Food in the Last Year: Never true   Transportation Needs: No Transportation Needs (7/13/2023)    PRAPARE - Transportation     Lack of Transportation (Medical): No     Lack of Transportation (Non-Medical): No   Physical Activity: Not on file   Stress: No Stress Concern Present (1/18/2019)    [address]     Feeling of Stress : Only a little   Social Connections: Unknown (1/18/2019)    Social Connection and Isolation Panel [NHANES]     Frequency of Communication with Friends and Family: Not on file     Frequency of Social Gatherings with Friends and Family: Not on file     Attends Druze Services: Not on file     Active Member of Clubs or Organizations: Not on file     Attends Club or Organization Meetings: Not on file     Marital Status:    Intimate Partner Violence: Not At Risk (1/18/2019)    Humiliation, Afraid, Rape, and Kick questionnaire     Fear of Current or Ex-Partner: No     Emotionally Abused: No     Physically Abused: No     Sexually Abused: No   Housing Stability: [address]  (2/1/2023)    Housing Stability Vital Sign     Unable to Pay for Housing in the Last Year: No     Number of [address] in the Last Year: 1     Unstable Housing in the Last Year: No       No Known Allergies      Current Outpatient Medications:     aspirin (ECOTRIN LOW STRENGTH) 81 mg EC tablet, Take 81 mg by mouth daily Resume on 8/14  , Disp: , Rfl:     Blood Pressure Monitoring (BLOOD PRESSURE CUFF) MISC, Use to check blood pressure before taking blood pressure medication and 1 hour after and follow instructions provided in discharge instructions based on the readings. , Disp: 1 each, Rfl: 0    divalproex sodium (DEPAKOTE SPRINKLE) 125 MG capsule, TAKE 2 CAPSULES (250 MG TOTAL) BY MOUTH EVERY 12 (TWELVE) HOURS, Disp: 360 capsule, Rfl: 1    metoprolol succinate (TOPROL-XL) 50 mg 24 hr tablet, Take 1 tablet (50 mg total) by mouth 2 (two) times a day, Disp: 180 tablet, Rfl: 3    midodrine (PROAMATINE) 2.5 mg tablet, Take 1 tablet (2.5 mg total) by mouth as needed (for SBP<90 on dialysis days), Disp: 30 tablet, Rfl: 0    ONETOUCH DELICA LANCETS 43V MISC, by Does not apply route 3 (three) times a day, Disp: 270 each, Rfl: 1    prasugrel (EFFIENT) tablet, Take 1 tablet (5 mg total) by mouth daily (Patient taking differently: Take 5 mg by mouth daily Takes with pudding), Disp: 90 tablet, Rfl: 3    Sevelamer Carbonate 2.4 g PACK, VIGOROUSLY MIX CONTENTS OF 1 PACKET IN WATER (IT WILL NOT DISSOLVE) AND DRINK 3 TIMES DAILY WITH MEALS, Disp: , Rfl:     atorvastatin (LIPITOR) 40 mg tablet, Take 1 tablet (40 mg total) by mouth daily with dinner, Disp: 90 tablet, Rfl: 1    Blood Glucose Monitoring Suppl (True Metrix Meter) w/Device KIT, Use to test blood sugars 3 times daily, Disp: 1 kit, Rfl: 0    collagenase (SANTYL) ointment, Apply topically daily, Disp: 90 g, Rfl: 0    D3-50 1.25 MG (20479 UT) capsule, TAKE 1 CAPSULE BY MOUTH ONE TIME PER WEEK, Disp: 12 capsule, Rfl: 2    fluticasone (FLONASE) 50 mcg/act nasal spray, 1 spray into each nostril daily (Patient not taking: Reported on 11/9/2023), Disp: 9.9 mL, Rfl: 0    gabapentin (Neurontin) 100 mg capsule, Take 1 capsule (100 mg total) by mouth 2 (two) times a day, Disp: 60 capsule, Rfl: 0    lidocaine-prilocaine (EMLA) cream, Apply topically as needed for mild pain, Disp: 30 g, Rfl: 3    sacubitril-valsartan (Entresto) 24-26 MG TABS, Take 1 tablet by mouth in the morning Bedtime, Disp: 90 tablet, Rfl: 3    True Metrix Blood Glucose Test test strip, USE 1 EACH 3 TIMES A DAY AS DIRECTED, Disp: 300 strip, Rfl: 1      /64 (BP Location: Right arm, Patient Position: Sitting, Cuff Size: Standard)   Pulse 74   Ht 5' 9" (1.753 m) Comment: per pt family  Wt 95.3 kg (210 lb 3.2 oz)   SpO2 97%   BMI 31.04 kg/m²          Physical Exam  Vitals and nursing note reviewed. Exam conducted with a chaperone present. Constitutional:       Appearance: He is well-developed. HENT:      Head: Normocephalic and atraumatic. Eyes:      Extraocular Movements: Extraocular movements intact. Cardiovascular:      Pulses:           Femoral pulses are 2+ on the right side and 2+ on the left side. Popliteal pulses are 2+ on the right side and 2+ on the left side. Dorsalis pedis pulses are 1+ on the left side. Posterior tibial pulses are 0 on the left side. Heart sounds: Normal heart sounds. Arteriovenous access: Left arteriovenous access is present. Pulmonary:      Effort: Pulmonary effort is normal.   Musculoskeletal:         General: Normal range of motion. Cervical back: Neck supple. Skin:     General: Skin is warm. Comments: Left heel eschar. See clinical image    Neurological:      Mental Status: He is alert and oriented to person, place, and time.    Psychiatric:         Mood and Affect: Mood normal.         Behavior: Behavior normal.

## 2023-07-09 ENCOUNTER — OFFICE VISIT (OUTPATIENT)
Dept: URGENT CARE | Facility: CLINIC | Age: 63
End: 2023-07-09
Payer: MEDICARE

## 2023-07-09 VITALS
HEART RATE: 80 BPM | DIASTOLIC BLOOD PRESSURE: 55 MMHG | SYSTOLIC BLOOD PRESSURE: 108 MMHG | HEIGHT: 69 IN | RESPIRATION RATE: 16 BRPM | TEMPERATURE: 97.4 F | OXYGEN SATURATION: 99 % | BODY MASS INDEX: 31.1 KG/M2 | WEIGHT: 210 LBS

## 2023-07-09 DIAGNOSIS — J00 NASOPHARYNGITIS: Primary | ICD-10-CM

## 2023-07-09 PROCEDURE — 99213 OFFICE O/P EST LOW 20 MIN: CPT | Performed by: NURSE PRACTITIONER

## 2023-07-09 PROCEDURE — G0463 HOSPITAL OUTPT CLINIC VISIT: HCPCS | Performed by: NURSE PRACTITIONER

## 2023-07-09 RX ORDER — LORATADINE 10 MG/1
10 TABLET ORAL DAILY
Qty: 30 TABLET | Refills: 0 | Status: SHIPPED | OUTPATIENT
Start: 2023-07-09

## 2023-07-09 RX ORDER — FLUTICASONE PROPIONATE 50 MCG
1 SPRAY, SUSPENSION (ML) NASAL DAILY
Qty: 9.9 ML | Refills: 0 | Status: SHIPPED | OUTPATIENT
Start: 2023-07-09

## 2023-07-09 NOTE — PROGRESS NOTES
[facility]        NAME: Jareth Penn is a 61 y.o. male  : 1960    MRN: [id number]  DATE: 2023  TIME: 9:46 AM    Assessment and Plan   Nasopharyngitis [J00]  1. Nasopharyngitis  loratadine (CLARITIN) 10 mg tablet    fluticasone (FLONASE) 50 mcg/act nasal spray            Patient Instructions   Tessalon perles as directed for cough  Flonase daily as directed  Claritin daily   Tylenol as needed for pain or fever  Rest  Follow up with your PCP for worsening symptoms    Follow up with PCP in 3-5 days. Proceed to  ER if symptoms worsen. Chief Complaint     Chief Complaint   Patient presents with   • Cold Like Symptoms     Runny nose, coughing, sneezing- started a couple days ago. OTC Benadryl          History of Present Illness       Patient is a 59-year-old male with significant past medical history of cardiac disease, renal failure on hemodialysis, and diabetes. He presents with a 3-day history of sneezing and coughing. He has clear rhinorrhea. Minimal congestion. Denies fever, chills, sore throat, ear pain. He denies pain anywhere. He has been using Benadryl and benzonatate with minimal improvement. Review of Systems   Review of Systems   Constitutional: Negative for activity change, chills and fever. HENT: Positive for postnasal drip, rhinorrhea and sneezing. Negative for congestion, ear pain, sinus pressure, sinus pain and sore throat. Respiratory: Positive for cough. Gastrointestinal: Negative for abdominal pain. Neurological: Negative for headaches.          Current Medications       Current Outpatient Medications:   •  aspirin (ECOTRIN LOW STRENGTH) 81 mg EC tablet, Take 81 mg by mouth daily Resume on   , Disp: , Rfl:   •  atorvastatin (LIPITOR) 40 mg tablet, TAKE 1 TABLET BY MOUTH EVERY DAY WITH DINNER, Disp: 90 tablet, Rfl: 1  •  Blood Pressure Monitoring (BLOOD PRESSURE CUFF) MISC, Use to check blood pressure before taking blood pressure medication and 1 hour after and follow instructions provided in discharge instructions based on the readings. , Disp: 1 each, Rfl: 0  •  collagenase (SANTYL) ointment, Apply topically daily, Disp: 90 g, Rfl: 0  •  D3-50 1.25 MG (17565 UT) capsule, TAKE 1 CAPSULE BY MOUTH ONE TIME PER WEEK, Disp: 12 capsule, Rfl: 2  •  divalproex sodium (DEPAKOTE SPRINKLE) 125 MG capsule, TAKE 2 CAPSULES (250 MG TOTAL) BY MOUTH EVERY 12 (TWELVE) HOURS, Disp: 360 capsule, Rfl: 1  •  fluticasone (FLONASE) 50 mcg/act nasal spray, 1 spray into each nostril daily, Disp: 9.9 mL, Rfl: 0  •  loratadine (CLARITIN) 10 mg tablet, Take 1 tablet (10 mg total) by mouth daily, Disp: 30 tablet, Rfl: 0  •  metoprolol succinate (TOPROL-XL) 50 mg 24 hr tablet, Take 1 tablet (50 mg total) by mouth 2 (two) times a day, Disp: 180 tablet, Rfl: 3  •  midodrine (PROAMATINE) 2.5 mg tablet, Take 1 tablet (2.5 mg total) by mouth as needed (for SBP<90 on dialysis days), Disp: 30 tablet, Rfl: 0  •  ONETOUCH DELICA LANCETS I MISC, by Does not apply route 3 (three) times a day, Disp: 270 each, Rfl: 1  •  prasugrel (EFFIENT) tablet, Take 1 tablet (5 mg total) by mouth daily, Disp: 90 tablet, Rfl: 3  •  sacubitril-valsartan (Entresto) 24-26 MG TABS, Take 1 tablet by mouth in the morning, Disp: 30 tablet, Rfl: 0  •  Sevelamer Carbonate 2.4 g PACK, VIGOROUSLY MIX CONTENTS OF 1 PACKET IN WATER (IT WILL NOT DISSOLVE) AND DRINK 3 TIMES DAILY WITH MEALS, Disp: , Rfl:   •  Blood Glucose Monitoring Suppl (True Metrix Meter) w/Device KIT, Use to test blood sugars 3 times daily (Patient not taking: Reported on 7/6/2023), Disp: 1 kit, Rfl: 0  •  glucose blood (True Metrix Blood Glucose Test) test strip, Use 1 each 3 (three) times a day Use as instructed (Patient not taking: Reported on 7/6/2023), Disp: 300 strip, Rfl: 1    Current Allergies     Allergies as of 07/09/2023   • (No Known Allergies)            The following portions of the patient's history were reviewed and updated as appropriate: allergies, current medications, past family history, past medical history, past social history, past surgical history and problem list.     Past Medical History:   Diagnosis Date   • Cerebrovascular accident (CVA) due to thrombosis of left middle cerebral artery ([address]) 07/29/2018   • Chronic kidney disease    • Diabetes mellitus ([address])    • Dialysis patient [facility])     M-W-F   • GERD (gastroesophageal reflux disease)    • Hypercholesteremia    • Hyperlipidemia    • Hypertension    • 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   • Neuropathy    • Obesity    • Osteomyelitis ([address] [facility])     last assessed 11/4/16   • PVC's (premature ventricular contractions)     sees cardiology Dr. Tavares camargo   • Stroke [facility])     last weeof July 2018 [facility]   • TIA (transient ischemic attack) 10/28/2018       Past Surgical History:   Procedure Laterality Date   • ABDOMINAL SURGERY     • CARDIAC CATHETERIZATION N/A 5/2/2022    Procedure: Cardiac Coronary Angiogram;  Surgeon: Dariusz Rae MD;  [facility]; Service: Cardiology   • CARDIAC CATHETERIZATION N/A 5/2/2022    Procedure: Cardiac pci;  Surgeon: Dariusz Rae MD;  [facility]; Service: Cardiology   • CARDIAC CATHETERIZATION  2/1/2023    Procedure: Cardiac catheterization;  Surgeon: Dariusz Rae MD;  [facility]; Service: Cardiology   • CARDIAC CATHETERIZATION N/A 2/1/2023    Procedure: Cardiac pci;  Surgeon: Dariusz Rae MD;  [facility]; Service: Cardiology   • CARDIAC CATHETERIZATION N/A 2/1/2023    Procedure: Cardiac Coronary Angiogram;  Surgeon: Dariusz Rae MD;  [facility]; Service: Cardiology   • CARDIAC CATHETERIZATION N/A 2/1/2023    Procedure: Cardiac other-IVUS;  Surgeon: Dariusz Rae MD;  [facility]: [facility];   Service: Cardiology   • CHOLECYSTECTOMY      Percutaneous   • COLONOSCOPY     • CYSTOSCOPY     • OTHER SURGICAL HISTORY "stimulator to control bowel movements"   • [place] ESOPHAGOGASTRODUODENOSCOPY TRANSORAL DIAGNOSTIC N/A 9/27/2016    Procedure: ESOPHAGOGASTRODUODENOSCOPY (EGD); Surgeon: Man Simms MD;  Location: [facility] GI [facility]; Service: Gastroenterology   • FL LAPAROSCOPY SURG CHOLECYSTECTOMY N/A 2/29/2016    Procedure: LAPAROSCOPIC CHOLECYSTECTOMY ;  Surgeon: Jessica Vega DO;  Location: [facility];  Service: General   • ROTATOR CUFF REPAIR Right    • TOE AMPUTATION Right 10/28/2016    Procedure: 3RD TOE AMPUTATION ;  Surgeon: Mahin Dominguez DPM;  Location[facility];  Service:        Family History   Problem Relation Age of Onset   • Leukemia Mother    • Liver disease Mother    • Lung cancer Mother         heavy smoker - 3 ppd   • Heart disease Father    • Liver disease Father    • Diabetes type I Father    • Multiple myeloma Sister    • Breast cancer Sister    • Urolithiasis Family    • Alcohol abuse Neg Hx    • Depression Neg Hx    • Drug abuse Neg Hx    • Substance Abuse Neg Hx    • Mental illness Neg Hx          Medications have been verified. Objective   /55   Pulse 80   Temp (!) 97.4 °F (36.3 °C)   Resp 16   Ht 5' 9" (1.753 m)   Wt 95.3 kg (210 lb)   SpO2 99%   BMI 31.01 kg/m²        Physical Exam     Physical Exam  Vitals reviewed. Constitutional:       General: He is awake. He is not in acute distress. Appearance: Normal appearance. He is normal weight. HENT:      Head: Normocephalic. Right Ear: Hearing, tympanic membrane, ear canal and external ear normal.      Left Ear: Hearing, tympanic membrane, ear canal and external ear normal.      Nose: Rhinorrhea present. Rhinorrhea is clear. Mouth/Throat:      Lips: Pink. Pharynx: Oropharynx is clear. Cardiovascular:      Rate and Rhythm: Normal rate and regular rhythm. Heart sounds: Normal heart sounds, S1 normal and S2 normal.   Pulmonary:      Effort: Pulmonary effort is normal.      Breath sounds: Normal breath sounds. No decreased breath sounds, wheezing, rhonchi or rales. Skin:     General: Skin is warm and moist.   Neurological:      General: No focal deficit present. Mental Status: He is alert, oriented to person, place, and time and easily aroused. Psychiatric:         Behavior: Behavior is cooperative.

## 2023-07-14 ENCOUNTER — OFFICE VISIT (OUTPATIENT)
Dept: CARDIOLOGY CLINIC | Facility: CLINIC | Age: 63
End: 2023-07-14
Payer: MEDICARE

## 2023-07-14 VITALS
DIASTOLIC BLOOD PRESSURE: 58 MMHG | SYSTOLIC BLOOD PRESSURE: 90 MMHG | HEIGHT: 69 IN | OXYGEN SATURATION: 96 % | BODY MASS INDEX: 30.96 KG/M2 | HEART RATE: 62 BPM | WEIGHT: 209 LBS

## 2023-07-14 DIAGNOSIS — N18.6 ESRD (END STAGE RENAL DISEASE) (HCC): ICD-10-CM

## 2023-07-14 DIAGNOSIS — I25.5 ISCHEMIC CARDIOMYOPATHY: Primary | ICD-10-CM

## 2023-07-14 DIAGNOSIS — I25.10 CORONARY ARTERY DISEASE INVOLVING NATIVE CORONARY ARTERY OF NATIVE HEART WITHOUT ANGINA PECTORIS: ICD-10-CM

## 2023-07-14 DIAGNOSIS — Z95.5 S/P CORONARY ARTERY STENT PLACEMENT: ICD-10-CM

## 2023-07-14 PROCEDURE — 99214 OFFICE O/P EST MOD 30 MIN: CPT | Performed by: INTERNAL MEDICINE

## 2023-07-14 NOTE — PROGRESS NOTES
Advanced Heart Failure Outpatient Consult Note - Lazaro Moralez 61 y.o. male MRN: [id number]    Encounter: [id number]      Assessment/Plan:    Patient Active Problem List    Diagnosis Date Noted   • Ulcer of left heel, limited to breakdown of skin ([address]) 04/25/2023   • Elevated troponin 03/17/2023   • Presence of external cardiac defibrillator 03/17/2023   • Diabetic polyneuropathy associated with type 2 diabetes mellitus ([address]) 03/07/2023   • Absence of toe of right foot ([address]) 03/07/2023   • Hammer toes of both feet 03/07/2023   • Type 1 non-ST elevation myocardial infarction (NSTEMI) s/p ADE to in-stent restenosis of mid LAD 02/02/2023   • Ischemic cardiomyopathy 02/02/2023   • Moderate AS (aortic stenosis) 02/02/2023   • Mood disorder ([address]) 02/02/2023   • Pre-syncope 01/06/2023   • SOB (shortness of breath) 10/21/2022   • Left arm swelling 10/21/2022   • CAD, multiple vessel 07/14/2022   • Electrolyte abnormality 05/02/2022   • Chest pain 05/01/2022   • Generalized weakness 04/19/2022   • Primary hypertension 04/06/2022   • Penetrating foot wound, left, initial encounter 01/19/2022   • Emotional lability 01/19/2022   • History of 2019 novel coronavirus disease (COVID-19) 12/26/2020   • ESRD on dialysis ([address]) 12/26/2020   • Anemia 10/05/2020   • S/P arteriovenous (AV) fistula creation 04/27/2020   • Pre-kidney transplant, listed 04/27/2020   • Urge incontinence 12/20/2019   • Overactive bladder 12/20/2019   • Anxiety associated with depression 02/28/2019   • History of stroke 10/04/2018   • Abnormal EEG 09/24/2018   • Other constipation 08/17/2018   • GERD (gastroesophageal reflux disease) 08/13/2018   • Diabetic macular edema ([address]) 08/09/2018   • Elevated alkaline phosphatase level 08/03/2018   • Nephrotic range proteinuria 03/28/2018   • Type 2 diabetes mellitus with chronic kidney disease on chronic dialysis, without long-term current use of insulin ([address]) 02/26/2016   • Dizziness 02/26/2016   • Mixed hyperlipidemia 02/26/2016       # Ischemic cardiomyopathy, Stage B/C, NYHA II  Euvolemic, warm on exam    Weight: 212 lbs 5/25/23; 209 lbs 7/14/23  NT proBNP: 17, 967 2/11/23    Studies- personally reviewed by me    Echo 6/20/23:  LVEF 38%, LVIDD 6.1cm. Eccentric hypertrophy  RV mildly dilated, normal systolic function  Mild AI, mild to moderate AS  Mild MR  Estimated PASP 54 + RAP    Echocardiogram 4/24/23  LVEF: 35%, 35-40% on my review, hypokinesis of the mid to apical lateral, anterior and apical walls  LVIDd: 6.1cm, normal wall thickness  RV: mildly dilated, normal systolic function  AV: moderate AS - peak damon 2.3m/sec, mean gradient 11mmHg, DVI 0.35, FATUMA 1.81, SVI 45.5  MR: moderate  PASP: 43mmHg, mild TR, estimated RAP 3, peak TR velocity 3.2m/sec  RVOT: no notching  Other: restrictive diastology, mod LAE, mild NILESH    Echo 2/28/23 ([facility])  LVEF 35 to 40%, hypokinesis of the basal inferior wall, mid to apical lateral and inferolateral walls and apex  LVIDD 5.6 cm  Mild aortic valve stenosis- peak velocity 2.2 m/s, mean gradient 12 mmHg, aortic valve area 1.9 cm², DVI 0.31  Mild MR  RV normal size and systolic function  Trace TR  PASP cannot be determined    [facility] 2/1/23:  •  1st Mrg lesion is 100% stenosed. •  RPAV lesion is 100% stenosed. •  Mid LAD lesion is 80% stenosed. •  RPDA lesion is 60% stenosed. •  The angioplasty was pre-stent angioplasty. •  The angioplasty was pre-stent angioplasty. Multivessel CAD with marked progression since the prior angiographic study in May 2022. Stents placed in the mid LAD exhibited significant discrete in-stent restenosis. The first OM branch, stented in 5/22, has become occluded. The distal RCA beyond the PDA has akso become occluded. LAD in--stent restenosis was interrogated by IVUS imaging and successfully treated with placement of a 4.0x13mm Orsiro ADE.   An attempt was made to wire the occluded OM branch, but the wire could not be advanced beyond the stent in mid vessel. Echo 1/30/23:  LVEF 25-30%. LVIDD 5.5 cm. Regional wall motion abnormalities. Normal RV size and systolic function  Moderate AS -peak gradient 9 mmHg, DVI 0.36, aortic valve area 1.97 cm²  Moderate MR  Estimated PA systolic pressure 50 mmHg  Estimated RAP 8 mmHg    Echo 4/2022: LVEF 65%. Moderate to severe concentric hypertrophy. Normal RV systolic function, mildly dilated. Moderate AS  Echo 10/2020: LVEF 60%    Diet:  2 g sodium diet  2000 mL fluid restriction    Neurohormonal Blockade:  --Beta-Blocker: Metoprolol succinate 50 mg twice daily  --ACEi, ARB or ARNi: entresto 24-26mg BID  --Aldosterone Receptor Blocker:  --SGLT2 Inhibitor: no known benefit on patients on HD  --Diuretic:    Sudden Cardiac Death Risk Reduction:  --ICD: Seen by Dr. Esteban Dyson for ICD evaluation. Deemed that he is going to be at high risk of infection due to hemodialysis and possible immunosuppression when he gets renal transplant. Also with osteomyelitis then. LVEF on repeat echo 6/20/23 38% - LifeVest returned    Electrical Resynchronization:  --Candidacy for BiV device: RBBB, QRSd 150s    Advanced Therapies (if appropriate): --We will continue to monitor; improvement in EF post PCI of the LAD 2/2023.      # CAD s/p stent in the LAD for in-stent stenosis 2/1/23, occluded OM stent (placed 5/2022, attempted to intervene - unable to wire), occluded RPAV   Rx: aspirin, prasugrel (on this agent as outlined by Dr. Anisa Saucedo on office visit note), statin  # PVC  # DM type 2, HbA1c 4.9 4/18/23  # CVA, thrombosis of left MCA 2018  # ESRD on dialysis  # Moderate aortic stenosis  # Mixed hyperlipidemia  # Hypertension  # Viral hepatitis  # Diabetic polyneuropathy  # Obesity with BMI 31  # Osteomyelitis    Today's Plan:  Continue current medications  BP limiting for further uptitration of entresto  Repeat echocardiogram 9/2023 to reassess EF  Patient to resume follow up with Dr. Anisa Saucedo  2g sodium diet  2L fluid restriction  Daily weights  Physical activities as tolerated      HPI:   61year old male with PMH of CAD s/p stents to the LAD and OM as above, ischemic cardiomyopathy, ESRD on HD, DM type 2, moderate aortic valve stenosis and osteomyelitis referred by Dr. Jeffrey Lares for heart failure management. He was admitted January 29, 2023 due to sudden onset of shortness of breath also with chest pressure/pain. He was in acute respiratory failure requiring BiPAP in the ED and is eventually transferred to the ICU. He was in pulmonary edema. He underwent volume removal with renal replacement therapy. He was also found to have elevated troponins and was started on heparin drip. Echocardiogram showed reduction in LVEF to 25 to 30%. Last known EF prior was normal.  He was transferred to [facility] to undergo cardiac catheterization and was found to have progression of coronary artery disease compared to cardiac catheterization in May 2022. He had in-stent stenosis of the LAD status post PCI/stent. He had occluded OM stent which could not be wired thus unable to revascularize. Also with occlusion of the RPAV branch. Patient placed on medical therapy and discharged on a LifeVest.  He was unable to tolerate Entresto in the past due to low blood pressure. Subsequent echocardiogram showed improvement in LVEF with echo in February 2023 at [facility] showing EF of 35 to 40%. Repeat echo on 4/24/2023 showed EF of 35%. Refer to Dr. Jeffrey Lares for ICD evaluation. Deemed that he is going to be at high risk of infection due to hemodialysis and possible immunosuppression when he gets renal transplant. Current osteomyelitis also a concern. Patient referred to heart failure service for further optimization of medical therapy and if he would be a candidate for Entresto or SGLT2 inhibitor, with the hope of improving LVEF more than 35% and not need defibrillator placement. Interval History:  7/14/23: here for follow up.  Started on low dose entresto on last office visit. Repeat echo 6/20/23 showed EF of 38%. He is feeling tired today and has URI with sinus congestion. Took midodrine today with low BP post HD. Mostly taking entresto 24-26mg at night. Low BP in the mornings when taking entresto. No chest pain, PND, orthopnea or lower extremity edema      Past Medical History:   Diagnosis Date   • Cerebrovascular accident (CVA) due to thrombosis of left middle cerebral artery ([address]) 07/29/2018   • Chronic kidney disease    • Diabetes mellitus ([address])    • Dialysis patient [facility])     M-W-F   • GERD (gastroesophageal reflux disease)    • Hypercholesteremia    • Hyperlipidemia    • Hypertension    • 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   • Neuropathy    • Obesity    • Osteomyelitis ([address])     last assessed 11/4/16   • PVC's (premature ventricular contractions)     sees cardiology Dr. Giuliano camargo   • Stroke [facility])     last weeof July 2018 [facility]   • TIA (transient ischemic attack) 10/28/2018       Review of Systems   Constitutional: Negative for chills, fatigue and fever. HENT: Negative for ear pain and sore throat. Eyes: Negative for pain and visual disturbance. Respiratory: Negative for cough, chest tightness and shortness of breath. Cardiovascular: Negative for chest pain, palpitations and leg swelling. Gastrointestinal: Negative for abdominal distention, abdominal pain, nausea and vomiting. Genitourinary: Negative for dysuria and hematuria. Musculoskeletal: Negative for arthralgias and back pain. Skin: Negative for color change and rash. Neurological: Negative for dizziness, seizures, syncope and light-headedness. All other systems reviewed and are negative. No Known Allergies  .     Current Outpatient Medications:   •  aspirin (ECOTRIN LOW STRENGTH) 81 mg EC tablet, Take 81 mg by mouth daily Resume on 8/14  , Disp: , Rfl:   •  atorvastatin (LIPITOR) 40 mg tablet, TAKE 1 TABLET BY MOUTH EVERY DAY WITH DINNER, Disp: 90 tablet, Rfl: 1  •  Blood Glucose Monitoring Suppl (True Metrix Meter) w/Device KIT, Use to test blood sugars 3 times daily, Disp: 1 kit, Rfl: 0  •  Blood Pressure Monitoring (BLOOD PRESSURE CUFF) MISC, Use to check blood pressure before taking blood pressure medication and 1 hour after and follow instructions provided in discharge instructions based on the readings. , Disp: 1 each, Rfl: 0  •  D3-50 1.25 MG (82725 UT) capsule, TAKE 1 CAPSULE BY MOUTH ONE TIME PER WEEK, Disp: 12 capsule, Rfl: 2  •  divalproex sodium (DEPAKOTE SPRINKLE) 125 MG capsule, TAKE 2 CAPSULES (250 MG TOTAL) BY MOUTH EVERY 12 (TWELVE) HOURS, Disp: 360 capsule, Rfl: 1  •  glucose blood (True Metrix Blood Glucose Test) test strip, Use 1 each 3 (three) times a day Use as instructed, Disp: 300 strip, Rfl: 1  •  metoprolol succinate (TOPROL-XL) 50 mg 24 hr tablet, Take 1 tablet (50 mg total) by mouth 2 (two) times a day, Disp: 180 tablet, Rfl: 3  •  midodrine (PROAMATINE) 2.5 mg tablet, Take 1 tablet (2.5 mg total) by mouth as needed (for SBP<90 on dialysis days), Disp: 30 tablet, Rfl: 0  •  ONETOUCH DELICA LANCETS 61G MISC, by Does not apply route 3 (three) times a day, Disp: 270 each, Rfl: 1  •  prasugrel (EFFIENT) tablet, Take 1 tablet (5 mg total) by mouth daily, Disp: 90 tablet, Rfl: 3  •  sacubitril-valsartan (Entresto) 24-26 MG TABS, Take 1 tablet by mouth in the morning, Disp: 30 tablet, Rfl: 0  •  Sevelamer Carbonate 2.4 g PACK, VIGOROUSLY MIX CONTENTS OF 1 PACKET IN WATER (IT WILL NOT DISSOLVE) AND DRINK 3 TIMES DAILY WITH MEALS, Disp: , Rfl:   •  collagenase (SANTYL) ointment, Apply topically daily (Patient not taking: Reported on 7/14/2023), Disp: 90 g, Rfl: 0  •  fluticasone (FLONASE) 50 mcg/act nasal spray, 1 spray into each nostril daily (Patient not taking: Reported on 7/14/2023), Disp: 9.9 mL, Rfl: 0  •  loratadine (CLARITIN) 10 mg tablet, Take 1 tablet (10 mg total) by mouth daily (Patient not taking: Reported on 7/14/2023), Disp: 30 tablet, Rfl: 0    Social History     Socioeconomic History   • Marital status: /Civil Union     Spouse name: Not on file   • Number of children: Not on file   • Years of education: Not on file   • Highest education level: Not asked   Occupational History   • Occupation: disabled   Tobacco Use   • Smoking status: Never   • Smokeless tobacco: Never   Vaping Use   • Vaping Use: Never used   Substance and Sexual Activity   • Alcohol use: Not Currently   • Drug use: No   • Sexual activity: Not Currently   Other Topics Concern   • Not on file   Social History Narrative    Daily caffeine consumption 2-3 servings a day     Social Determinants of Health     Financial Resource Strain: Unknown (7/13/2023)    Overall Financial Resource Strain (CARDIA)    • Difficulty of Paying Living Expenses: Patient refused   Food Insecurity: No Food Insecurity (2/1/2023)    Hunger Vital Sign    • Worried About Running Out of Food in the Last Year: Never true    • Ran Out of Food in the Last Year: Never true   Transportation Needs: No Transportation Needs (7/13/2023)    PRAPARE - Transportation    • Lack of Transportation (Medical): No    • Lack of Transportation (Non-Medical): No   Physical Activity: Not on file   Stress: No Stress Concern Present (1/18/2019)    [facility]    • Feeling of Stress :  Only a little   Social Connections: Unknown (1/18/2019)    Social Connection and Isolation Panel [NHANES]    • Frequency of Communication with Friends and Family: Not on file    • Frequency of Social Gatherings with Friends and Family: Not on file    • Attends Anabaptist Services: Not on file    • Active Member of Clubs or Organizations: Not on file    • Attends Club or Organization Meetings: Not on file    • Marital Status:    Intimate Partner Violence: Not At Risk (1/18/2019)    Humiliation, Afraid, Rape, and Kick questionnaire    • Fear of Current or Ex-Partner: No • Emotionally Abused: No    • Physically Abused: No    • Sexually Abused: No   Housing Stability: Low Risk  (2/1/2023)    Housing Stability Vital Sign    • Unable to Pay for Housing in the Last Year: No    • Number of Places Lived in the Last Year: 1    • Unstable Housing in the Last Year: No       Family History   Problem Relation Age of Onset   • Leukemia Mother    • Liver disease Mother    • Lung cancer Mother         heavy smoker - 3 ppd   • Heart disease Father    • Liver disease Father    • Diabetes type I Father    • Multiple myeloma Sister    • Breast cancer Sister    • Urolithiasis Family    • Alcohol abuse Neg Hx    • Depression Neg Hx    • Drug abuse Neg Hx    • Substance Abuse Neg Hx    • Mental illness Neg Hx        Physical Exam:    Vitals: Blood pressure 90/58, pulse 62, height 5' 9" (1.753 m), weight 94.8 kg (209 lb), SpO2 96 %. , Body mass index is 30.86 kg/m².,   Wt Readings from Last 3 Encounters:   07/14/23 94.8 kg (209 lb)   07/09/23 95.3 kg (210 lb)   07/06/23 95.3 kg (210 lb 3.2 oz)       Physical Exam  Constitutional:       General: He is not in acute distress. Appearance: Normal appearance. HENT:      Head: Normocephalic and atraumatic. Mouth/Throat:      Mouth: Mucous membranes are moist.   Eyes:      General: No scleral icterus. Extraocular Movements: Extraocular movements intact. Neck:      Vascular: No JVD. Cardiovascular:      Rate and Rhythm: Normal rate and regular rhythm. Pulses: Normal pulses. Heart sounds: S1 normal and S2 normal. Murmur heard. Systolic murmur is present with a grade of 2/6. No friction rub. No gallop. Pulmonary:      Breath sounds: Normal breath sounds. Abdominal:      General: There is no distension. Palpations: Abdomen is soft. Tenderness: There is no abdominal tenderness. There is no guarding or rebound. Musculoskeletal:         General: Normal range of motion. Cervical back: Neck supple.       Right lower leg: No edema. Left lower leg: No edema. Comments: LUE AV fistula   Skin:     General: Skin is warm and dry. Capillary Refill: Capillary refill takes less than 2 seconds. Neurological:      General: No focal deficit present. Mental Status: He is alert and oriented to person, place, and time. Psychiatric:         Mood and Affect: Mood normal.         Labs & Results:    Lab Results   Component Value Date    WBC 4.76 06/03/2023    HGB 10.2 (L) 06/03/2023    HCT 34.0 (L) 06/03/2023    MCV 98 06/03/2023     (L) 06/03/2023     Lab Results   Component Value Date    SODIUM 140 06/13/2023    K 4.7 06/13/2023    CL 97 06/13/2023    CO2 31 06/13/2023    BUN 44 (H) 06/13/2023    CREATININE 7.36 (H) 06/13/2023    GLUC 102 06/13/2023    CALCIUM 8.8 06/13/2023     Lab Results   Component Value Date    NTBNP 17,967 (H) 02/11/2023      Lab Results   Component Value Date    CHOLESTEROL 76 01/30/2023    CHOLESTEROL 82 07/25/2022    CHOLESTEROL 92 06/20/2022     Lab Results   Component Value Date    HDL 30 (L) 01/30/2023    HDL 33 (L) 07/25/2022    HDL 31 (L) 06/20/2022     Lab Results   Component Value Date    TRIG 123 01/30/2023    TRIG 151 (H) 07/25/2022    TRIG 182 (H) 06/20/2022     Lab Results   Component Value Date    NONHDLC 46 01/30/2023    [address] 49 07/25/2022    [address] 61 06/20/2022       EKG personally reviewed by Freddy Betancourt MD.     Counseling / Coordination of Care  Time spent today 25 minutes. Greater than 50% of total time was spent with the patient and / or family counseling and / or coordination of care. We discussed diagnoses, most recent studies and any changes in treatment    Thank you for the opportunity to participate in the care of this patient.     Freddy Betancourt MD  ADVANCED HEART FAILURE AND MECHANICAL CIRCULATORY SUPPORT  [address]

## 2023-07-14 NOTE — PATIENT INSTRUCTIONS
Continue current cardiac medications  Obtain echocardiogram in 9/2023 to reassess heart function  2g sodium diet  2L fluid restriction  Daily weights

## 2023-07-16 ENCOUNTER — APPOINTMENT (EMERGENCY)
Dept: RADIOLOGY | Facility: HOSPITAL | Age: 63
End: 2023-07-16
Payer: MEDICARE

## 2023-07-16 ENCOUNTER — HOSPITAL ENCOUNTER (EMERGENCY)
Facility: HOSPITAL | Age: 63
Discharge: HOME | End: 2023-07-16
Attending: EMERGENCY MEDICINE
Payer: MEDICARE

## 2023-07-16 ENCOUNTER — APPOINTMENT (EMERGENCY)
Dept: CT IMAGING | Facility: HOSPITAL | Age: 63
End: 2023-07-16
Payer: MEDICARE

## 2023-07-16 VITALS
RESPIRATION RATE: 20 BRPM | TEMPERATURE: 97.7 F | OXYGEN SATURATION: 98 % | SYSTOLIC BLOOD PRESSURE: 143 MMHG | DIASTOLIC BLOOD PRESSURE: 70 MMHG | HEART RATE: 84 BPM

## 2023-07-16 DIAGNOSIS — R05.2 SUBACUTE COUGH: ICD-10-CM

## 2023-07-16 DIAGNOSIS — J18.9 PNA (PNEUMONIA): Primary | ICD-10-CM

## 2023-07-16 LAB
ALBUMIN SERPL BCP-MCNC: 4.1 G/DL (ref 3.5–5)
ALP SERPL-CCNC: 88 U/L (ref 34–104)
ALT SERPL W P-5'-P-CCNC: 6 U/L (ref 7–52)
ANION GAP SERPL CALCULATED.3IONS-SCNC: 11 MMOL/L
AST SERPL W P-5'-P-CCNC: 6 U/L (ref 13–39)
ATRIAL RATE: 71 BPM
BASOPHILS # BLD AUTO: 0.02 THOUSANDS/ÂΜL (ref 0–0.1)
BASOPHILS NFR BLD AUTO: 0 % (ref 0–1)
BILIRUB SERPL-MCNC: 0.6 MG/DL (ref 0.2–1)
BUN SERPL-MCNC: 41 MG/DL (ref 5–25)
CALCIUM SERPL-MCNC: 8.9 MG/DL (ref 8.4–10.2)
CHLORIDE SERPL-SCNC: 100 MMOL/L (ref 96–108)
CO2 SERPL-SCNC: 31 MMOL/L (ref 21–32)
CREAT SERPL-MCNC: 7.59 MG/DL (ref 0.6–1.3)
EOSINOPHIL # BLD AUTO: 0.09 THOUSAND/ÂΜL (ref 0–0.61)
EOSINOPHIL NFR BLD AUTO: 2 % (ref 0–6)
ERYTHROCYTE [DISTWIDTH] IN BLOOD BY AUTOMATED COUNT: 17.6 % (ref 11.6–15.1)
FLUAV RNA RESP QL NAA+PROBE: NEGATIVE
FLUBV RNA RESP QL NAA+PROBE: NEGATIVE
GFR SERPL CREATININE-BSD FRML MDRD: 6 ML/MIN/1.73SQ M
GLUCOSE SERPL-MCNC: 84 MG/DL (ref 65–140)
HCT VFR BLD AUTO: 34.8 % (ref 36.5–49.3)
HGB BLD-MCNC: 10.5 G/DL (ref 12–17)
IMM GRANULOCYTES # BLD AUTO: 0.02 THOUSAND/UL (ref 0–0.2)
IMM GRANULOCYTES NFR BLD AUTO: 0 % (ref 0–2)
LYMPHOCYTES # BLD AUTO: 1 THOUSANDS/ÂΜL (ref 0.6–4.47)
LYMPHOCYTES NFR BLD AUTO: 18 % (ref 14–44)
MCH RBC QN AUTO: 30.3 PG (ref 26.8–34.3)
MCHC RBC AUTO-ENTMCNC: 30.2 G/DL (ref 31.4–37.4)
MCV RBC AUTO: 100 FL (ref 82–98)
MONOCYTES # BLD AUTO: 0.72 THOUSAND/ÂΜL (ref 0.17–1.22)
MONOCYTES NFR BLD AUTO: 13 % (ref 4–12)
NEUTROPHILS # BLD AUTO: 3.7 THOUSANDS/ÂΜL (ref 1.85–7.62)
NEUTS SEG NFR BLD AUTO: 67 % (ref 43–75)
NRBC BLD AUTO-RTO: 0 /100 WBCS
P AXIS: 79 DEGREES
PLATELET # BLD AUTO: 119 THOUSANDS/UL (ref 149–390)
PMV BLD AUTO: 11.3 FL (ref 8.9–12.7)
POTASSIUM SERPL-SCNC: 4.5 MMOL/L (ref 3.5–5.3)
PR INTERVAL: 168 MS
PROT SERPL-MCNC: 7 G/DL (ref 6.4–8.4)
QRS AXIS: 177 DEGREES
QRSD INTERVAL: 156 MS
QT INTERVAL: 460 MS
QTC INTERVAL: 499 MS
RBC # BLD AUTO: 3.47 MILLION/UL (ref 3.88–5.62)
RSV RNA RESP QL NAA+PROBE: NEGATIVE
SARS-COV-2 RNA RESP QL NAA+PROBE: NEGATIVE
SODIUM SERPL-SCNC: 142 MMOL/L (ref 135–147)
T WAVE AXIS: -32 DEGREES
VENTRICULAR RATE: 71 BPM
WBC # BLD AUTO: 5.55 THOUSAND/UL (ref 4.31–10.16)

## 2023-07-16 PROCEDURE — 71250 CT THORAX DX C-: CPT

## 2023-07-16 PROCEDURE — 80053 COMPREHEN METABOLIC PANEL: CPT

## 2023-07-16 PROCEDURE — 71046 X-RAY EXAM CHEST 2 VIEWS: CPT

## 2023-07-16 PROCEDURE — 93005 ELECTROCARDIOGRAM TRACING: CPT

## 2023-07-16 PROCEDURE — 93010 ELECTROCARDIOGRAM REPORT: CPT | Performed by: INTERNAL MEDICINE

## 2023-07-16 PROCEDURE — 99285 EMERGENCY DEPT VISIT HI MDM: CPT

## 2023-07-16 PROCEDURE — G1004 CDSM NDSC: HCPCS

## 2023-07-16 PROCEDURE — 85025 COMPLETE CBC W/AUTO DIFF WBC: CPT

## 2023-07-16 PROCEDURE — 36415 COLL VENOUS BLD VENIPUNCTURE: CPT

## 2023-07-16 PROCEDURE — 94640 AIRWAY INHALATION TREATMENT: CPT

## 2023-07-16 PROCEDURE — 0241U HB NFCT DS VIR RESP RNA 4 TRGT: CPT

## 2023-07-16 RX ORDER — ALBUTEROL SULFATE 2.5 MG/3ML
5 SOLUTION RESPIRATORY (INHALATION) ONCE
Status: COMPLETED | OUTPATIENT
Start: 2023-07-16 | End: 2023-07-16

## 2023-07-16 RX ORDER — AZITHROMYCIN 250 MG/1
500 TABLET, FILM COATED ORAL ONCE
Status: COMPLETED | OUTPATIENT
Start: 2023-07-16 | End: 2023-07-16

## 2023-07-16 RX ORDER — ALBUTEROL SULFATE 90 UG/1
2 AEROSOL, METERED RESPIRATORY (INHALATION) ONCE
Status: COMPLETED | OUTPATIENT
Start: 2023-07-16 | End: 2023-07-16

## 2023-07-16 RX ORDER — AZITHROMYCIN 250 MG/1
TABLET, FILM COATED ORAL
Qty: 4 TABLET | Refills: 0 | Status: SHIPPED | OUTPATIENT
Start: 2023-07-16 | End: 2023-07-20

## 2023-07-16 RX ADMIN — ALBUTEROL SULFATE 5 MG: 2.5 SOLUTION RESPIRATORY (INHALATION) at 02:39

## 2023-07-16 RX ADMIN — AZITHROMYCIN MONOHYDRATE 500 MG: 250 TABLET ORAL at 04:24

## 2023-07-16 RX ADMIN — IPRATROPIUM BROMIDE 0.5 MG: 0.5 SOLUTION RESPIRATORY (INHALATION) at 02:39

## 2023-07-16 RX ADMIN — ALBUTEROL SULFATE 2 PUFF: 90 AEROSOL, METERED RESPIRATORY (INHALATION) at 04:24

## 2023-07-16 NOTE — DISCHARGE INSTRUCTIONS
-Please follow-up with your primary care physician within the next couple days.  -Return to the emergency department if you begin develop high fevers, chills, shortness of breath, inability to breathe, blue discoloration of the skin, severe chest pains, continued or worsening symptoms.    -Cold like symptoms are self-limited and may last up to 14 days, and cough may last up to 21 days. Stay well hydrated. Wash and clean hands regularly to prevent new infection and spreading of infection. Tylenol and ibuprofen as needed for fever, and/or muscle aches. You may attempt Neti pot, cool mist humidifier, hot showers, nasal spray, or nasal saline for congestion. Diphenhydramine/Benadryl prior to bed for congestion or runny nose. Pectin based lozenges or ice chips as need for throat irritation. Honey may help relieve symptoms of sore throat, but do not give honey to an infant younger than 3year old. You may also attempt topical application of ointments containing camphor, menthol, and eucalyptus oils as needed.   -Please follow up w/ your primary care provider concerning high fevers >101, severe headaches, shortness of breath, or continued/worsening symptoms.

## 2023-07-16 NOTE — ED PROVIDER NOTES
History  Chief Complaint   Patient presents with   • Cough     Patient was seen at pcp earlier and diagnosed with sinus infection causing his cough. Wife believes cough is coming from his chest and wants him reevaluated. 69-year-old male with history of ESRD on HD, CHF, HLD, HTN, obesity presents with cough. Patient and wife state 2-week history of cough with associated midsternal sharp chest pain on cough. I personally reviewed chart and previously seen on 7/9/2023 for the same and stated nasopharyngitis and started on Claritin and Flonase. Wife says continued symptoms since then and now productive of yellow sputum. Denies any missed hemodialysis sessions. Denies medication noncompliance. Denies fevers, chills, shortness of breath, wheezing, congestion, sore throat, dysphagia, dysarthria, odynophagia, diaphoresis, nausea, vomiting, abdominal pain, dysuria, frequency, changes in bowel movement, rash. Cough      Prior to Admission Medications   Prescriptions Last Dose Informant Patient Reported? Taking? Blood Glucose Monitoring Suppl (True Metrix Meter) w/Device KIT  Child No No   Sig: Use to test blood sugars 3 times daily   Blood Pressure Monitoring (BLOOD PRESSURE CUFF) MISC  Child No No   Sig: Use to check blood pressure before taking blood pressure medication and 1 hour after and follow instructions provided in discharge instructions based on the readings.    D3-50 1.25 MG (14436 UT) capsule  Child No No   Sig: TAKE 1 CAPSULE BY MOUTH ONE TIME PER WEEK   ONETOUCH DELICA LANCETS 66G MISC  Child No No   Sig: by Does not apply route 3 (three) times a day   Sevelamer Carbonate 2.4 g PACK  Child Yes No   Sig: VIGOROUSLY MIX CONTENTS OF 1 PACKET IN WATER (IT WILL NOT DISSOLVE) AND DRINK 3 TIMES DAILY WITH MEALS   aspirin (ECOTRIN LOW STRENGTH) 81 mg EC tablet  Child Yes No   Sig: Take 81 mg by mouth daily Resume on 8/14     atorvastatin (LIPITOR) 40 mg tablet  Child No No   Sig: TAKE 1 TABLET BY MOUTH EVERY DAY WITH DINNER   collagenase (SANTYL) ointment  Child No No   Sig: Apply topically daily   Patient not taking: Reported on 2023   divalproex sodium (DEPAKOTE SPRINKLE) 125 MG capsule  Child No No   Sig: TAKE 2 CAPSULES (250 MG TOTAL) BY MOUTH EVERY 12 (TWELVE) HOURS   fluticasone (FLONASE) 50 mcg/act nasal spray  Child No No   Si spray into each nostril daily   Patient not taking: Reported on 2023   glucose blood (True Metrix Blood Glucose Test) test strip  Child No No   Sig: Use 1 each 3 (three) times a day Use as instructed   loratadine (CLARITIN) 10 mg tablet  Child No No   Sig: Take 1 tablet (10 mg total) by mouth daily   Patient not taking: Reported on 2023   metoprolol succinate (TOPROL-XL) 50 mg 24 hr tablet  Child No No   Sig: Take 1 tablet (50 mg total) by mouth 2 (two) times a day   midodrine (PROAMATINE) 2.5 mg tablet  Child No No   Sig: Take 1 tablet (2.5 mg total) by mouth as needed (for SBP<90 on dialysis days)   prasugrel (EFFIENT) tablet  Child No No   Sig: Take 1 tablet (5 mg total) by mouth daily   sacubitril-valsartan (Entresto) 24-26 MG TABS  Child No No   Sig: Take 1 tablet by mouth in the morning      Facility-Administered Medications: None       Past Medical History:   Diagnosis Date   • Cerebrovascular accident (CVA) due to thrombosis of left middle cerebral artery ([address]) 2018   • Chronic kidney disease    • Diabetes mellitus ([address])    • Dialysis patient [facility])     M-W-   • GERD (gastroesophageal reflux disease)    • Hypercholesteremia    • Hyperlipidemia    • Hypertension    • 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   • Neuropathy    • Obesity    • Osteomyelitis ([address])     last assessed 16   • PVC's (premature ventricular contractions)     sees cardiology Dr. Socorro camargo   • Stroke [facility])     last weeof 2018 [facility]   • TIA (transient ischemic attack) 10/28/2018       Past Surgical History:   Procedure Laterality Date   • ABDOMINAL SURGERY     • CARDIAC CATHETERIZATION N/A 5/2/2022    Procedure: Cardiac Coronary Angiogram;  Surgeon: Mallory Dugan MD;  [facility]; Service: Cardiology   • CARDIAC CATHETERIZATION N/A 5/2/2022    Procedure: Cardiac pci;  Surgeon: Mallory Dugan MD;  [facility]; Service: Cardiology   • CARDIAC CATHETERIZATION  2/1/2023    Procedure: Cardiac catheterization;  Surgeon: Mallory Dugan MD;  [facility]ARD[facility]; Service: Cardiology   • CARDIAC CATHETERIZATION N/A 2/1/2023    Procedure: Cardiac pci;  Surgeon: Mallory Dugan MD;  [facility]: [facility]; Service: Cardiology   • CARDIAC CATHETERIZATION N/A 2/1/2023    Procedure: Cardiac Coronary Angiogram;  Surgeon: Mallory Dugan MD;  [facility]; Service: Cardiology   • CARDIAC CATHETERIZATION N/A 2/1/2023    Procedure: Cardiac other-IVUS;  Surgeon: Mallory Dugan MD;  [facility]; Service: Cardiology   • CHOLECYSTECTOMY      Percutaneous   • COLONOSCOPY     • CYSTOSCOPY     • OTHER SURGICAL HISTORY      "stimulator to control bowel movements"   • NH ESOPHAGOGASTRODUODENOSCOPY TRANSORAL DIAGNOSTIC N/A 9/27/2016    Procedure: ESOPHAGOGASTRODUODENOSCOPY (EGD); Surgeon: Marlo Coyle MD;  Location: [facility] GI LAB;   Service: Gastroenterology   • NH LAPAROSCOPY SURG CHOLECYSTECTOMY N/A 2/29/2016    Procedure: LAPAROSCOPIC CHOLECYSTECTOMY ;  Surgeon: Elizabeth Marks DO;  Location: [facility] Main OR;  Service: General   • ROTATOR CUFF REPAIR Right    • TOE AMPUTATION Right 10/28/2016    Procedure: 3RD TOE AMPUTATION ;  Surgeon: Joel Gaona DPM;  Location: [facility];  Service:        Family History   Problem Relation Age of Onset   • Leukemia Mother    • Liver disease Mother    • Lung cancer Mother         heavy smoker - 3 ppd   • Heart disease Father    • Liver disease Father    • Diabetes type I Father    • Multiple myeloma Sister    • Breast cancer Sister    • Urolithiasis Family • Alcohol abuse Neg Hx    • Depression Neg Hx    • Drug abuse Neg Hx    • Substance Abuse Neg Hx    • Mental illness Neg Hx      I have reviewed and agree with the history as documented. E-Cigarette/Vaping   • E-Cigarette Use Never User      E-Cigarette/Vaping Substances   • Nicotine No    • THC No    • CBD No    • Flavoring No    • Other No    • Unknown No      Social History     Tobacco Use   • Smoking status: Never   • Smokeless tobacco: Never   Vaping Use   • Vaping Use: Never used   Substance Use Topics   • Alcohol use: Not Currently   • Drug use: No        Review of Systems   Respiratory: Positive for cough. All other systems reviewed and are negative. Physical Exam  ED Triage Vitals [07/16/23 0146]   Temperature Pulse Respirations Blood Pressure SpO2   97.7 °F (36.5 °C) 84 18 143/70 98 %      Temp Source Heart Rate Source Patient Position - Orthostatic VS BP Location FiO2 (%)   Oral Monitor Lying Right arm --      Pain Score       --             Orthostatic Vital Signs  Vitals:    07/16/23 0146   BP: 143/70   Pulse: 84   Patient Position - Orthostatic VS: Lying       Physical Exam  Vitals and nursing note reviewed. Constitutional:       General: He is not in acute distress. Appearance: Normal appearance. He is obese. He is not ill-appearing, toxic-appearing or diaphoretic. HENT:      Head: Normocephalic and atraumatic. Right Ear: External ear normal.      Left Ear: External ear normal.      Nose: Nose normal. No congestion or rhinorrhea. Mouth/Throat:      Mouth: Mucous membranes are moist.      Pharynx: Oropharynx is clear. Eyes:      General: No scleral icterus. Right eye: No discharge. Left eye: No discharge. Extraocular Movements: Extraocular movements intact. Cardiovascular:      Rate and Rhythm: Normal rate and regular rhythm. Pulses: Normal pulses. Heart sounds: Normal heart sounds. No murmur heard. No friction rub. No gallop. Pulmonary:      Effort: Pulmonary effort is normal. No respiratory distress. Breath sounds: No stridor. Wheezing (Bibasilar expiratory wheezing.) present. No rhonchi or rales. Chest:      Chest wall: No tenderness. Abdominal:      General: There is no distension. Palpations: Abdomen is soft. There is no mass. Tenderness: There is no abdominal tenderness. There is no right CVA tenderness, left CVA tenderness, guarding or rebound. Hernia: A hernia (Umbilical) is present. Musculoskeletal:         General: No swelling, tenderness, deformity or signs of injury. Normal range of motion. Cervical back: Normal range of motion and neck supple. No tenderness. Right lower leg: No edema. Left lower leg: No edema. Skin:     General: Skin is warm and dry. Capillary Refill: Capillary refill takes less than 2 seconds. Coloration: Skin is not cyanotic, jaundiced or pale. Findings: No bruising, erythema, lesion, petechiae or rash. Neurological:      General: No focal deficit present. Mental Status: He is alert and oriented to person, place, and time. Mental status is at baseline.          ED Medications  Medications   azithromycin (ZITHROMAX) tablet 500 mg (has no administration in time range)   albuterol (PROVENTIL HFA,VENTOLIN HFA) inhaler 2 puff (has no administration in time range)   albuterol inhalation solution 5 mg (5 mg Nebulization Given 7/16/23 0239)   ipratropium (ATROVENT) 0.02 % inhalation solution 0.5 mg (0.5 mg Nebulization Given 7/16/23 0239)       Diagnostic Studies  Results Reviewed     Procedure Component Value Units Date/Time    FLU/RSV/COVID - if FLU/RSV clinically relevant [[id number]]  (Normal) Collected: 07/16/23 0236    Lab Status: Final result Specimen: Nares from Nose Updated: 07/16/23 0332     SARS-CoV-2 Negative     INFLUENZA A PCR Negative     INFLUENZA B PCR Negative     RSV PCR Negative    Narrative:      FOR PEDIATRIC PATIENTS - copy/paste COVID Guidelines URL to browser: [email or web address] ashx    SARS-CoV-2 assay is a Nucleic Acid Amplification assay intended for the  qualitative detection of nucleic acid from SARS-CoV-2 in nasopharyngeal  swabs. Results are for the presumptive identification of SARS-CoV-2 RNA. Positive results are indicative of infection with SARS-CoV-2, the virus  causing COVID-19, but do not rule out bacterial infection or co-infection  with other viruses. Laboratories within the [facility] and its  territories are required to report all positive results to the appropriate  public health authorities. Negative results do not preclude SARS-CoV-2  infection and should not be used as the sole basis for treatment or other  patient management decisions. Negative results must be combined with  clinical observations, patient history, and epidemiological information. This test has not been FDA cleared or approved. This test has been authorized by FDA under an Emergency Use Authorization  (EUA). This test is only authorized for the duration of time the  declaration that circumstances exist justifying the authorization of the  emergency use of an in vitro diagnostic tests for detection of SARS-CoV-2  virus and/or diagnosis of COVID-19 infection under section 564(b)(1) of  the Act, 21 U. S.C. 817DLK-4(S)(1), unless the authorization is terminated  or revoked sooner. The test has been validated but independent review by FDA  and CLIA is pending. Test performed using [employer] GeneXpert: This RT-PCR assay targets N2,  a region unique to SARS-CoV-2. A conserved region in the E-gene was chosen  for pan-Sarbecovirus detection which includes SARS-CoV-2. According to CMS-2020-01-R, this platform meets the definition of high-throughput technology.     Comprehensive metabolic panel [[id number]]  (Abnormal) Collected: 07/16/23 0236    Lab Status: Final result Specimen: Blood from Arm, Right Updated: 07/16/23 0312     Sodium 142 mmol/L      Potassium 4.5 mmol/L      Chloride 100 mmol/L      CO2 31 mmol/L      ANION GAP 11 mmol/L      BUN 41 mg/dL      Creatinine 7.59 mg/dL      Glucose 84 mg/dL      Calcium 8.9 mg/dL      AST 6 U/L      ALT 6 U/L      Alkaline Phosphatase 88 U/L      Total Protein 7.0 g/dL      Albumin 4.1 g/dL      Total Bilirubin 0.60 mg/dL      eGFR 6 ml/min/1.73sq m     Narrative:      National Kidney Disease Foundation guidelines for Chronic Kidney Disease (CKD):   •  Stage 1 with normal or high GFR (GFR > 90 mL/min/1.73 square meters)  •  Stage 2 Mild CKD (GFR = 60-89 mL/min/1.73 square meters)  •  Stage 3A Moderate CKD (GFR = 45-59 mL/min/1.73 square meters)  •  Stage 3B Moderate CKD (GFR = 30-44 mL/min/1.73 square meters)  •  Stage 4 Severe CKD (GFR = 15-29 mL/min/1.73 square meters)  •  Stage 5 End Stage CKD (GFR <15 mL/min/1.73 square meters)  Note: GFR calculation is accurate only with a steady state creatinine    CBC and differential [[id number]]  (Abnormal) Collected: 07/16/23 0236    Lab Status: Final result Specimen: Blood from Arm, Right Updated: 07/16/23 0252     WBC 5.55 Thousand/uL      RBC 3.47 Million/uL      Hemoglobin 10.5 g/dL      Hematocrit 34.8 %       fL      MCH 30.3 pg      MCHC 30.2 g/dL      RDW 17.6 %      MPV 11.3 fL      Platelets 421 Thousands/uL      nRBC 0 /100 WBCs      Neutrophils Relative 67 %      Immat GRANS % 0 %      Lymphocytes Relative 18 %      Monocytes Relative 13 %      Eosinophils Relative 2 %      Basophils Relative 0 %      Neutrophils Absolute 3.70 Thousands/µL      Immature Grans Absolute 0.02 Thousand/uL      Lymphocytes Absolute 1.00 Thousands/µL      Monocytes Absolute 0.72 Thousand/µL      Eosinophils Absolute 0.09 Thousand/µL      Basophils Absolute 0.02 Thousands/µL                  CT chest without contrast   Final Result by Saad Contreras MD (07/16 9953)      Limited evaluation due to motion artifact. No focal airspace consolidation. Mild patchy groundglass opacity and interstitial prominence may represent interstitial edema. Workstation performed: [id number]         XR chest 2 views   ED Interpretation by Argelia Almonte MD ([id number]/[id number])   Vascular congestion. No pleural effusion, pneumothorax, free air under the diaphragm. We will get follow-up CT chest.            Procedures  ECG 12 Lead Documentation Only    Date/Time: 7/16/2023 3:09 AM    Performed by: Argelia Almonte MD  Authorized by: Argelia Almonte MD    Interpretation:     Interpretation comment:  As in ED course          ED Course  ED Course as of 07/16/23 0421   Sun Jul 16, 2023   0307 CBC and differential(!)  Similar hgb compared to previous, new increase in MCV, increased PLT from baseline   0309 EKG personally interpreted by me, right bundle branch block, rate of 71, normal OK interval, wide QRS interval, QTc 499, no STEMI noted, when compared to previous EKG on June 2, 2023 no significant changes were noted. 0334 BUN(!): 41  Baseline   0334 Creatinine(!): 7.59  New elevation from previous, 4.6 one month ago   0401 IMPRESSION:     Limited evaluation due to motion artifact. No focal airspace consolidation.     Mild patchy groundglass opacity and interstitial prominence may represent interstitial edema.              Workstation performed: [id number]                                       Medical Decision Making  42-year-old male presents with cough with differential including but not limited to viral URI, pharyngitis, flu, COVID, RSV, electrolyte abnormality, pneumonia. History obtained from patient and wife. I personally reviewed chest x-ray as above which was suggestive of fluid overload, follow-up CT chest due to abnormal chest x-ray in the setting of possible pneumonia. I personally reviewed the CT and noted no consolidations or pleural effusions. Read as in the ED course per radiology.     Labs as above in ED course, no significant change from baseline. Started on amoxicillin for suspected pneumonia in the setting of cough with sputum change. Advised to follow-up with [facility] care physician in next 48 hours. Discharged home to self-care with strict return precautions for signs and symptoms suggestive of evolving infection, ACS, fluid overload, or continued or worsening symptoms. Patient and wife were understanding and agreement with plan. Amount and/or Complexity of Data Reviewed  Labs: ordered. Decision-making details documented in ED Course. Radiology: ordered and independent interpretation performed. Risk  Prescription drug management. Disposition  Final diagnoses:   PNA (pneumonia)   Subacute cough     Time reflects when diagnosis was documented in both MDM as applicable and the Disposition within this note     Time User Action Codes Description Comment    7/16/2023  4:05 AM Osvaldo Black Add [J18.9] PNA (pneumonia)     7/16/2023  4:07 AM Karo Black Add [R05.2] Subacute cough       ED Disposition     ED Disposition   Discharge    Condition   Stable    Date/Time   Sun Jul 16, 2023  4:05 AM    Comment   [address] discharge to home/self care. Follow-up Information     Follow up With Specialties Details Why Contact Info Additional [facility], DO Internal Medicine Schedule an appointment as soon as possible for a visit in 2 days  [address]       [address] [place] [address] Emergency Department Emergency Medicine Go to  If symptoms worsen [address] [facility], [place], [place], [address]          Patient's Medications   Discharge Prescriptions    AZITHROMYCIN (ZITHROMAX) 250 MG TABLET    1 tablet daily x 4 days       Start Date: 7/16/2023 End Date: 7/20/2023       Order Dose: -- Quantity: 4 tablet    Refills: 0     No discharge procedures on file. PDMP Review       Value Time User    PDMP Reviewed  Yes 7/16/2023  1:52 AM Aury Landon MD           ED Provider  Attending physically available and evaluated Van Alvarez I managed the patient along with the ED Attending.     Electronically Signed by         Shar Rivera MD  07/16/23 3299

## 2023-07-16 NOTE — ED ATTENDING ATTESTATION
7/16/2023  I, Delmy Yousif MD, saw and evaluated the patient. I have discussed the patient with the resident/non-physician practitioner and agree with the resident's/non-physician practitioner's findings, Plan of Care, and MDM as documented in the resident's/non-physician practitioner's note, except where noted. All available labs and Radiology studies were reviewed. I was present for key portions of any procedure(s) performed by the resident/non-physician practitioner and I was immediately available to provide assistance. At this point I agree with the current assessment done in the Emergency Department. I have conducted an independent evaluation of this patient a history and physical is as follows: Patient is a 61year old male with 2 weeks of productive cough. No fever. No N/V. Patient is on dialysis. Not on abx. No travel. No known ill contacts. (+) chest pain only with coughing. Was last seen at [facility] [address] [place] [address] Cardiology in [facility] on 7/14/23 for ischemic cardiomyopathy. [facility] website checked on this patient and last Rx filled was on 3/15/23 for oxycodone for 5 day supply. NCAT. Mucous membranes moist. Occasional wheezing. No rales or rhonci. Nontender chest. Heart regular without murmur. Abdomen soft and nontender. Good bowel sounds. No edema. No rash noted. DDx including but not limited to:  URI, bronchitis, pneumonia, GERD, aspiration pneumonitis, viral illness, COVID 23, smoke inhalation, CO poisoning, adverse medication reaction. Doubt cardiac etiology or PE or PTX. Will check labs and EKG. I reviewed CXR.  Will check CT chest.       ED Course         Critical Care Time  Procedures

## 2023-07-20 ENCOUNTER — OFFICE VISIT (OUTPATIENT)
Dept: INTERNAL MEDICINE CLINIC | Facility: CLINIC | Age: 63
End: 2023-07-20
Payer: MEDICARE

## 2023-07-20 VITALS
HEART RATE: 61 BPM | DIASTOLIC BLOOD PRESSURE: 84 MMHG | SYSTOLIC BLOOD PRESSURE: 128 MMHG | TEMPERATURE: 98.3 F | BODY MASS INDEX: 30.98 KG/M2 | OXYGEN SATURATION: 98 % | WEIGHT: 209.8 LBS

## 2023-07-20 DIAGNOSIS — E78.2 MIXED HYPERLIPIDEMIA: ICD-10-CM

## 2023-07-20 DIAGNOSIS — E11.22 TYPE 2 DIABETES MELLITUS WITH CHRONIC KIDNEY DISEASE ON CHRONIC DIALYSIS, WITHOUT LONG-TERM CURRENT USE OF INSULIN (HCC): ICD-10-CM

## 2023-07-20 DIAGNOSIS — N18.6 TYPE 2 DIABETES MELLITUS WITH CHRONIC KIDNEY DISEASE ON CHRONIC DIALYSIS, WITHOUT LONG-TERM CURRENT USE OF INSULIN (HCC): ICD-10-CM

## 2023-07-20 DIAGNOSIS — Z99.2 ESRD ON DIALYSIS (HCC): ICD-10-CM

## 2023-07-20 DIAGNOSIS — N18.6 ESRD ON DIALYSIS (HCC): ICD-10-CM

## 2023-07-20 DIAGNOSIS — F39 MOOD DISORDER (HCC): Primary | Chronic | ICD-10-CM

## 2023-07-20 DIAGNOSIS — Z79.899 ON VALPROATE THERAPY: ICD-10-CM

## 2023-07-20 DIAGNOSIS — Z99.2 TYPE 2 DIABETES MELLITUS WITH CHRONIC KIDNEY DISEASE ON CHRONIC DIALYSIS, WITHOUT LONG-TERM CURRENT USE OF INSULIN (HCC): ICD-10-CM

## 2023-07-20 DIAGNOSIS — Z00.00 MEDICARE ANNUAL WELLNESS VISIT, SUBSEQUENT: ICD-10-CM

## 2023-07-20 DIAGNOSIS — I10 PRIMARY HYPERTENSION: ICD-10-CM

## 2023-07-20 PROBLEM — R55 PRE-SYNCOPE: Status: RESOLVED | Noted: 2023-01-06 | Resolved: 2023-07-20

## 2023-07-20 PROBLEM — N32.81 OVERACTIVE BLADDER: Status: RESOLVED | Noted: 2019-12-20 | Resolved: 2023-07-20

## 2023-07-20 PROCEDURE — G0439 PPPS, SUBSEQ VISIT: HCPCS | Performed by: INTERNAL MEDICINE

## 2023-07-20 PROCEDURE — 99214 OFFICE O/P EST MOD 30 MIN: CPT | Performed by: INTERNAL MEDICINE

## 2023-07-20 RX ORDER — ATORVASTATIN CALCIUM 40 MG/1
40 TABLET, FILM COATED ORAL
Qty: 90 TABLET | Refills: 1 | Status: SHIPPED | OUTPATIENT
Start: 2023-07-20

## 2023-07-20 NOTE — PROGRESS NOTES
Health Risk Assessment:   Patient rates overall health as fair. Patient feels that their physical health rating is same. Patient is satisfied with their life. Eyesight was rated as same. Hearing was rated as same. Patient feels that their emotional and mental health rating is same. Patients states they are sometimes angry. Patient states they are sometimes unusually tired/fatigued. Pain experienced in the last 7 days has been none. Patient states that he has experienced no weight loss or gain in last 6 months. Fall Risk Screening: In the past year, patient has experienced: history of falling in past year      Home Safety:  Patient has trouble with stairs inside or outside of their home. Patient has working smoke alarms and has working carbon monoxide detector. Home safety hazards include: none. Nutrition:   Current diet is Diabetic and No Added Salt. Medications:   Patient is not currently taking any over-the-counter supplements. Patient is able to manage medications. Activities of Daily Living (ADLs)/Instrumental Activities of Daily Living (IADLs):   Walk and transfer into and out of bed and chair?: Yes  Dress and groom yourself?: No    Bathe or shower yourself?: No    Feed yourself? Yes  Do your laundry/housekeeping?: No  Manage your money, pay your bills and track your expenses?: No  Make your own meals?: No    Do your own shopping?: No    Previous Hospitalizations:   Any hospitalizations or ED visits within the last 12 months?: Yes    How many hospitalizations have you had in the last year?: more than 4    Advance Care Planning:   Living will: Yes    Durable POA for healthcare:  Yes    Advanced directive: Yes      PREVENTIVE SCREENINGS      Cardiovascular Screening:    General: Screening Not Indicated and History Lipid Disorder      Diabetes Screening:     General: Screening Not Indicated and History Diabetes      Colorectal Cancer Screening:     General: Screening Current      Prostate Cancer Screening:    General: Screening Current      Lung Cancer Screening:     General: Screening Not Indicated      Hepatitis C Screening:    General: Screening Current    Screening, Brief Intervention, and Referral to Treatment (SBIRT)    Screening  Typical number of drinks in a day: 0  Typical number of drinks in a week: 0  Interpretation: Low risk drinking behavior.     AUDIT-C Screenin) How often did you have a drink containing alcohol in the past year? never  2) How many drinks did you have on a typical day when you were drinking in the past year? 0  3) How often did you have 6 or more drinks on one occasion in the past year? never    AUDIT-C Score: 0  Interpretation: Score 0-3 (male): Negative screen for alcohol misuse    Single Item Drug Screening:  How often have you used an illegal drug (including marijuana) or a prescription medication for non-medical reasons in the past year? never    Single Item Drug Screen Score: 0  Interpretation: Negative screen for possible drug use disorder

## 2023-07-20 NOTE — PROGRESS NOTES
Name: Dorie Jaramillo      : 1960      MRN: [id number]  Encounter Provider: Waldo Perez DO  Encounter Date: 2023   Encounter department: [address] [place] [address]     1. Mood disorder ([address])  Comments:  taking depakote and stable, c/w rx    2. Mixed hyperlipidemia  Comments:  taking statin and lipids at goal, c/w rx  Orders:  -     atorvastatin (LIPITOR) 40 mg tablet; Take 1 tablet (40 mg total) by mouth daily with dinner  -     Lipid Panel with Direct LDL reflex; Future; Expected date: 2023    3. ESRD on dialysis [facility])  Comments:  sees nephrology for ongoing care and gets HD on M//    4. Primary hypertension  Comments:  taking BB as directed by nephrology    5. On valproate therapy  -     Valproic acid level, total; Future; Expected date: 2023    6. Type 2 diabetes mellitus with chronic kidney disease on chronic dialysis, without long-term current use of insulin ([facility])  Comments:  last A1C was 4.9%, c/w diet control for DM  Orders:  -     Hemoglobin A1C; Future; Expected date: 2023    7. Medicare annual wellness visit, subsequent           Subjective      HPI     Here for follow up, here with wife who provides most of history. Amy Ndiaye is overall stable. He is possibly being listed for kidney transplant on Aug 2nd with [facility]. He had Hep A vaccines thru Dialysis center. His blood sugars are doing well and his valproic acid rx is helping, level has been acceptable as well. He is unable to get an appt with psychiatrist after several attempts to do so. He is no longer wearing lifevest.  He is hopeful he can get a kidney transplant. He has a heel wound that he healing and under care of vascular/podiatrist.  ROS otherwise negative, no other complaints. Review of Systems   Constitutional: Negative for fever. HENT: Negative for congestion. Eyes: Negative for visual disturbance. Respiratory: Negative for shortness of breath. Cardiovascular: Negative for chest pain. Gastrointestinal: Negative for abdominal pain. Endocrine: Negative for polyuria. Genitourinary: Negative for difficulty urinating. Musculoskeletal: Positive for gait problem (wearing a shoe on left foot to help heel to heal). Skin: Negative for pallor. Allergic/Immunologic: Negative for immunocompromised state. Neurological: Negative for dizziness. Psychiatric/Behavioral: Negative for dysphoric mood. Current Outpatient Medications on File Prior to Visit   Medication Sig   • aspirin (ECOTRIN LOW STRENGTH) 81 mg EC tablet Take 81 mg by mouth daily Resume on 8/14     • azithromycin (ZITHROMAX) 250 mg tablet 1 tablet daily x 4 days   • Blood Glucose Monitoring Suppl (True Metrix Meter) w/Device KIT Use to test blood sugars 3 times daily   • Blood Pressure Monitoring (BLOOD PRESSURE CUFF) MISC Use to check blood pressure before taking blood pressure medication and 1 hour after and follow instructions provided in discharge instructions based on the readings.    • collagenase (SANTYL) ointment Apply topically daily   • D3-50 1.25 MG (53598 UT) capsule TAKE 1 CAPSULE BY MOUTH ONE TIME PER WEEK   • divalproex sodium (DEPAKOTE SPRINKLE) 125 MG capsule TAKE 2 CAPSULES (250 MG TOTAL) BY MOUTH EVERY 12 (TWELVE) HOURS   • fluticasone (FLONASE) 50 mcg/act nasal spray 1 spray into each nostril daily   • glucose blood (True Metrix Blood Glucose Test) test strip Use 1 each 3 (three) times a day Use as instructed   • loratadine (CLARITIN) 10 mg tablet Take 1 tablet (10 mg total) by mouth daily   • metoprolol succinate (TOPROL-XL) 50 mg 24 hr tablet Take 1 tablet (50 mg total) by mouth 2 (two) times a day   • midodrine (PROAMATINE) 2.5 mg tablet Take 1 tablet (2.5 mg total) by mouth as needed (for SBP<90 on dialysis days)   • ONETOUCH DELICA LANCETS 91Q MISC by Does not apply route 3 (three) times a day   • prasugrel (EFFIENT) tablet Take 1 tablet (5 mg total) by mouth daily   • sacubitril-valsartan (Entresto) 24-26 MG TABS Take 1 tablet by mouth in the morning   • Sevelamer Carbonate 2.4 g PACK VIGOROUSLY MIX CONTENTS OF 1 PACKET IN WATER (IT WILL NOT DISSOLVE) AND DRINK 3 TIMES DAILY WITH MEALS   • [DISCONTINUED] atorvastatin (LIPITOR) 40 mg tablet TAKE 1 TABLET BY MOUTH EVERY DAY WITH DINNER       Objective     /84 (BP Location: Right arm, Patient Position: Sitting, Cuff Size: Standard)   Pulse 61   Temp 98.3 °F (36.8 °C)   Wt 95.2 kg (209 lb 12.8 oz)   SpO2 98%   BMI 30.98 kg/m²     Physical Exam  Vitals reviewed. Constitutional:       General: He is not in acute distress. HENT:      Head: Normocephalic and atraumatic. Right Ear: Tympanic membrane normal.      Left Ear: Tympanic membrane normal.   Eyes:      Conjunctiva/sclera: Conjunctivae normal.   Cardiovascular:      Rate and Rhythm: Normal rate and regular rhythm. Heart sounds: Murmur heard. Systolic murmur is present. Pulmonary:      Effort: Pulmonary effort is normal.      Breath sounds: No wheezing or rales. Abdominal:      General: Bowel sounds are normal.      Palpations: Abdomen is soft. Tenderness: There is no abdominal tenderness. Musculoskeletal:      Right lower leg: No edema. Left lower leg: No edema. Comments: Left foot in surgical shoe   Neurological:      Mental Status: He is alert. Mental status is at baseline. Psychiatric:         Mood and Affect: Mood is anxious (and concerned, wants to get kidney transplant). Affect is tearful. Behavior: Behavior normal.         Thought Content: Thought content does not include suicidal ideation.        Elissa Vargas, DO

## 2023-07-25 ENCOUNTER — OFFICE VISIT (OUTPATIENT)
Dept: PODIATRY | Facility: CLINIC | Age: 63
End: 2023-07-25
Payer: MEDICARE

## 2023-07-25 VITALS — BODY MASS INDEX: 30.66 KG/M2 | HEIGHT: 69 IN | WEIGHT: 207 LBS

## 2023-07-25 DIAGNOSIS — I73.9 PERIPHERAL ARTERIAL DISEASE (HCC): ICD-10-CM

## 2023-07-25 DIAGNOSIS — B35.1 ONYCHOMYCOSIS: ICD-10-CM

## 2023-07-25 DIAGNOSIS — L97.421 ULCER OF LEFT HEEL, LIMITED TO BREAKDOWN OF SKIN (HCC): Primary | ICD-10-CM

## 2023-07-25 DIAGNOSIS — E11.42 DIABETIC POLYNEUROPATHY ASSOCIATED WITH TYPE 2 DIABETES MELLITUS (HCC): ICD-10-CM

## 2023-07-25 PROCEDURE — 11721 DEBRIDE NAIL 6 OR MORE: CPT | Performed by: PODIATRIST

## 2023-07-25 PROCEDURE — 99213 OFFICE O/P EST LOW 20 MIN: CPT | Performed by: PODIATRIST

## 2023-07-25 NOTE — PROGRESS NOTES
PATIENT:  Paramjit Izquierdo    1960      ASSESSMENT:     1. Ulcer of left heel, limited to breakdown of skin ([address])        2. Diabetic polyneuropathy associated with type 2 diabetes mellitus ([address])        3. Peripheral arterial disease ([address])        4. Onychomycosis  Debridement          PLAN:  1. Reviewed medical records. Reviewed the note from vascular surgery. Reviewed arterial study. Patient was counseled on the condition and diagnosis. 2. Wound is stable with eschar. Use betadine. Continue Globopedi shoe. 3. Pt to follow-up with vascular surgery. Possible a-gram.    4. He will return in 3 weeks. Procedure: All mycotic toenails were reduced and debrided in length, width, and girth using a nail nipper and dremel. Patient tolerated procedure(s) well without complications. Subjective:      HPI:   The patients presents for evaluation of left heel ulcer. Wound looks stable. No redness. Minimal pain. He presents with Globoped shoe today. He saw vascular surgery and uses betadine to the wound. BS under control. He also complained of thick, elongated toenails. The following portions of the patient's history were reviewed and updated as appropriate: allergies, current medications, past family history, past medical history, past social history, past surgical history and problem list.  All pertinent labs and images were reviewed.     Past Medical History  Past Medical History:   Diagnosis Date   • Cerebrovascular accident (CVA) due to thrombosis of left middle cerebral artery ([address]) 07/29/2018   • Chronic kidney disease    • Diabetes mellitus ([address])    • Dialysis patient [facility])     M-W-F   • GERD (gastroesophageal reflux disease)    • Hypercholesteremia    • Hyperlipidemia    • Hypertension    • 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   • Neuropathy    • Obesity    • Osteomyelitis ([address])     last assessed 11/4/16   • PVC's (premature ventricular contractions)     sees cardiology Dr. Nisa camargo   • Stroke [facility])     last weeof July 2018 [facility]   • TIA (transient ischemic attack) 10/28/2018       Past Surgical History  Past Surgical History:   Procedure Laterality Date   • ABDOMINAL SURGERY     • CARDIAC CATHETERIZATION N/A 5/2/2022    Procedure: Cardiac Coronary Angiogram;  Surgeon: Chong De La Cruz MD;  Location: [facility] CARDIAC CATH LAB; Service: Cardiology   • CARDIAC CATHETERIZATION N/A 5/2/2022    Procedure: Cardiac pci;  Surgeon: Chong De La Cruz MD;  Location: [facility] CARDIAC CATH LAB; Service: Cardiology   • CARDIAC CATHETERIZATION  2/1/2023    Procedure: Cardiac catheterization;  Surgeon: Chong De La Cruz MD;  Location: [facility] CARDIAC CATH LAB; Service: Cardiology   • CARDIAC CATHETERIZATION N/A 2/1/2023    Procedure: Cardiac pci;  Surgeon: Chong De La Cruz MD;  Location: [facility] CARDIAC CATH LAB; Service: Cardiology   • CARDIAC CATHETERIZATION N/A 2/1/2023    Procedure: Cardiac Coronary Angiogram;  Surgeon: Chong De La Cruz MD;  Location: [facility] CARDIAC CATH LAB; Service: Cardiology   • CARDIAC CATHETERIZATION N/A 2/1/2023    Procedure: Cardiac other-IVUS;  Surgeon: Chong De La Cruz MD;  Location: [facility] CARDIAC CATH LAB; Service: Cardiology   • CHOLECYSTECTOMY      Percutaneous   • COLONOSCOPY     • CYSTOSCOPY     • OTHER SURGICAL HISTORY      "stimulator to control bowel movements"   • AK ESOPHAGOGASTRODUODENOSCOPY TRANSORAL DIAGNOSTIC N/A 9/27/2016    Procedure: ESOPHAGOGASTRODUODENOSCOPY (EGD); Surgeon: Lane Guzman MD;  Location: [facility] GI LAB; Service: Gastroenterology   • AK LAPAROSCOPY SURG CHOLECYSTECTOMY N/A 2/29/2016    Procedure: LAPAROSCOPIC CHOLECYSTECTOMY ;  Surgeon: Wang Valladares DO;  Location: [facility] Main OR;  Service: General   • ROTATOR CUFF REPAIR Right    • TOE AMPUTATION Right 10/28/2016    Procedure: 3RD TOE AMPUTATION ;  Surgeon: Joseph Alvarado DPM;  Location: [facility] Main OR;  Service:         Allergies:  Patient has no known allergies.     Medications:  Current Outpatient Medications   Medication Sig Dispense Refill   • aspirin (ECOTRIN LOW STRENGTH) 81 mg EC tablet Take 81 mg by mouth daily Resume on 8/14       • atorvastatin (LIPITOR) 40 mg tablet Take 1 tablet (40 mg total) by mouth daily with dinner 90 tablet 1   • Blood Glucose Monitoring Suppl (True Metrix Meter) w/Device KIT Use to test blood sugars 3 times daily 1 kit 0   • Blood Pressure Monitoring (BLOOD PRESSURE CUFF) MISC Use to check blood pressure before taking blood pressure medication and 1 hour after and follow instructions provided in discharge instructions based on the readings. 1 each 0   • collagenase (SANTYL) ointment Apply topically daily 90 g 0   • D3-50 1.25 MG (50555 UT) capsule TAKE 1 CAPSULE BY MOUTH ONE TIME PER WEEK 12 capsule 2   • divalproex sodium (DEPAKOTE SPRINKLE) 125 MG capsule TAKE 2 CAPSULES (250 MG TOTAL) BY MOUTH EVERY 12 (TWELVE) HOURS 360 capsule 1   • fluticasone (FLONASE) 50 mcg/act nasal spray 1 spray into each nostril daily 9.9 mL 0   • glucose blood (True Metrix Blood Glucose Test) test strip Use 1 each 3 (three) times a day Use as instructed 300 strip 1   • loratadine (CLARITIN) 10 mg tablet Take 1 tablet (10 mg total) by mouth daily 30 tablet 0   • metoprolol succinate (TOPROL-XL) 50 mg 24 hr tablet Take 1 tablet (50 mg total) by mouth 2 (two) times a day 180 tablet 3   • midodrine (PROAMATINE) 2.5 mg tablet Take 1 tablet (2.5 mg total) by mouth as needed (for SBP<90 on dialysis days) 30 tablet 0   • ONETOUCH DELICA LANCETS 97Q MISC by Does not apply route 3 (three) times a day 270 each 1   • prasugrel (EFFIENT) tablet Take 1 tablet (5 mg total) by mouth daily 90 tablet 3   • sacubitril-valsartan (Entresto) 24-26 MG TABS Take 1 tablet by mouth in the morning 30 tablet 0   • Sevelamer Carbonate 2.4 g PACK VIGOROUSLY MIX CONTENTS OF 1 PACKET IN WATER (IT WILL NOT DISSOLVE) AND DRINK 3 TIMES DAILY WITH MEALS       No current facility-administered medications for this visit.        Social History:  Social History     Socioeconomic History   • Marital status: /Civil Union     Spouse name: None   • Number of children: None   • Years of education: None   • Highest education level: Not asked   Occupational History   • Occupation: disabled   Tobacco Use   • Smoking status: Never   • Smokeless tobacco: Never   Vaping Use   • Vaping Use: Never used   Substance and Sexual Activity   • Alcohol use: Not Currently   • Drug use: No   • Sexual activity: Not Currently   Other Topics Concern   • None   Social History Narrative    Daily caffeine consumption 2-3 servings a day     Social Determinants of Health     Financial Resource Strain: Unknown (7/13/2023)    Overall Financial Resource Strain (CARDIA)    • Difficulty of Paying Living Expenses: Patient refused   Food Insecurity: No Food Insecurity (2/1/2023)    Hunger Vital Sign    • Worried About Running Out of Food in the Last Year: Never true    • Ran Out of Food in the Last Year: Never true   Transportation Needs: No Transportation Needs (7/13/2023)    PRAPARE - Transportation    • Lack of Transportation (Medical): No    • Lack of Transportation (Non-Medical): No   Physical Activity: Not on file   Stress: No Stress Concern Present (1/18/2019)    [address] [place] [address]    • Feeling of Stress :  Only a little   Social Connections: Unknown (1/18/2019)    Social Connection and Isolation Panel [NHANES]    • Frequency of Communication with Friends and Family: Not on file    • Frequency of Social Gatherings with Friends and Family: Not on file    • Attends Church Services: Not on file    • Active Member of Clubs or Organizations: Not on file    • Attends Club or Organization Meetings: Not on file    • Marital Status:    Intimate Partner Violence: Not At Risk (1/18/2019)    Humiliation, Afraid, Rape, and Kick questionnaire    • Fear of Current or Ex-Partner: No    • Emotionally Abused: No    • Physically Abused: No    • Sexually Abused: No   Housing Stability: Low Risk  (2/1/2023)    Housing Stability Vital Sign    • Unable to Pay for Housing in the Last Year: No    • Number of Places Lived in the Last Year: 1    • Unstable Housing in the Last Year: No        Review of Systems   Constitutional: Negative for chills and fever. Respiratory: Negative for cough and shortness of breath. Cardiovascular: Negative for chest pain. Gastrointestinal: Negative for constipation, diarrhea, nausea and vomiting. Neurological: Positive for numbness. Objective:      Ht 5' 9" (1.753 m)   Wt 93.9 kg (207 lb)   BMI 30.57 kg/m²          Physical Exam  Vitals reviewed. Constitutional:       General: He is not in acute distress. Appearance: He is well-developed. He is not toxic-appearing or diaphoretic. Cardiovascular:      Rate and Rhythm: Normal rate and regular rhythm. Pulses:           Dorsalis pedis pulses are 1+ on the right side and 1+ on the left side. Posterior tibial pulses are 0 on the right side and 0 on the left side. Pulmonary:      Effort: Pulmonary effort is normal. No respiratory distress. Musculoskeletal:         General: Deformity present. No tenderness. Right foot: No foot drop. Left foot: No foot drop. Comments: Hammertoe deformity noted. 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. Feet:      Right foot:      Skin integrity: Dry skin present. No ulcer, skin breakdown, erythema, warmth or callus. Left foot:      Skin integrity: Dry skin present. No ulcer, skin breakdown, erythema, warmth or callus. Skin:     General: Skin is warm. Capillary Refill: Capillary refill takes less than 2 seconds. Coloration: Skin is not cyanotic or mottled. Findings: No ecchymosis. Nails: There is no clubbing. Comments: Ulcer presents left heel. It is covered with dry eschar. It is 1.0 X 1.0 cm with minimal depth. No cellulitis.   Thick, discolored toenails with onycholysis. Neurological:      General: No focal deficit present. Mental Status: He is alert and oriented to person, place, and time. Cranial Nerves: No cranial nerve deficit. Sensory: Sensory deficit present. Motor: No weakness. Psychiatric:         Mood and Affect: Mood normal.         Behavior: Behavior normal.         Thought Content:  Thought content normal.         Judgment: Judgment normal.

## 2023-08-02 ENCOUNTER — TELEPHONE (OUTPATIENT)
Dept: VASCULAR SURGERY | Facility: CLINIC | Age: 63
End: 2023-08-02

## 2023-08-02 ENCOUNTER — OFFICE VISIT (OUTPATIENT)
Dept: VASCULAR SURGERY | Facility: CLINIC | Age: 63
End: 2023-08-02
Payer: MEDICARE

## 2023-08-02 VITALS
SYSTOLIC BLOOD PRESSURE: 108 MMHG | HEIGHT: 69 IN | HEART RATE: 87 BPM | WEIGHT: 210 LBS | BODY MASS INDEX: 31.1 KG/M2 | DIASTOLIC BLOOD PRESSURE: 62 MMHG

## 2023-08-02 DIAGNOSIS — L97.421 ULCER OF LEFT HEEL, LIMITED TO BREAKDOWN OF SKIN (HCC): Primary | ICD-10-CM

## 2023-08-02 PROCEDURE — 99215 OFFICE O/P EST HI 40 MIN: CPT | Performed by: SURGERY

## 2023-08-02 RX ORDER — CHLORHEXIDINE GLUCONATE 0.12 MG/ML
15 RINSE ORAL ONCE
OUTPATIENT
Start: 2023-08-02 | End: 2023-08-02

## 2023-08-02 NOTE — PROGRESS NOTES
Assessment/Plan:    Ulcer of left heel, limited to breakdown of skin (HCC)  Left heel ulcer is not improving for about 3 months. Risk factors of diabetes, ESRD on HD   Arterial duplex shows tibioperoneal disease. Its in the posterior tibial distribution, we should proceed with arteriogram and possible intervention to optimize flow. Operative Scheduling Information:    Hospital:  [facility]    Physician:  Mandy    Surgery: Left leg angiogram with intervention    Urgency:  Standard    Level:  Level 3: Outpatients to be scheduled for elective surgery than can be delayed up to 4 weeks without reasonable expectation of detriment to patient    Case Length:  Normal    Post-op Bed:  Outpatient    OR Table:  Diavibe    Equipment Needs:  None    Medication Instructions:  Aspirin:   Continue (do not hold)  Other: effient  Continue (do not hold)    Hydration:  No    Contrast Allergy:  no       Diagnoses and all orders for this visit:    Ulcer of left heel, limited to breakdown of skin ([address])  -     Case request operating room: Left leg angiogram with intervention; Standing  -     CBC and Platelet; Future  -     EKG 12 lead; Future  -     XR chest pa & lateral; Future  -     Ambulatory referral to Cardiology; Future  -     Basic metabolic panel; Future  -     Case request operating room: Left leg angiogram with intervention    Other orders  -     Diet NPO; Sips with meds; Standing  -     Void on call to OR; Standing  -     Insert peripheral IV; Standing  -     Nursing Communication CHG bath, have staff wash entire body (neck down) per pre op bathing protocol. Routine, evening prior to, and day of surgery.; Standing  -     Nursing Communication Swab both nares with Povidone-Iodine solution, EXCLUDE if patient has shellfish/Iodine allergy, and replace with nasal alcohol swabstick.  Routine, day of surgery, on call to OR.; Standing  -     chlorhexidine (PERIDEX) 0.12 % oral rinse 15 mL  -     Shower/scrub; Standing Subjective:      Patient ID: Rishi Stein is a 61 y.o. male. Patient presents for 3week f/u evaluation of wound in L heel. Pt reports pain in L foot. L heel wound is closed and dry. Kaycee Lloydlupillo He is not a smoker. HPI  1 month follow up. Left heel ulcer persists. He has pain in the left heel wound as well. He is seeing Dr. Abeba De Leon from podiatry. History of end-stage renal disease on dialysis for past 3 years via left upper extremity fistula. Patient takes daily aspirin and atorvastatin. The following portions of the patient's history were reviewed and updated as appropriate: allergies, current medications, past family history, past medical history, past social history, past surgical history and problem list.    Review of Systems   Skin: Positive for wound (L heel wound). All other systems reviewed and are negative. I have reviewed the ROS as entered and made changes as necessary. Objective:      /62 (BP Location: Right arm, Patient Position: Sitting, Cuff Size: Standard)   Pulse 87   Ht 5' 9" (1.753 m)   Wt 95.3 kg (210 lb)   BMI 31.01 kg/m²          Physical Exam  Vitals and nursing note reviewed. Constitutional:       Appearance: Normal appearance. HENT:      Head: Normocephalic and atraumatic. Cardiovascular:      Rate and Rhythm: Normal rate and regular rhythm. Pulses:           Dorsalis pedis pulses are 1+ on the left side. Heart sounds: Normal heart sounds. Comments: There is no left posterior tibial signal or pulse  Pulmonary:      Effort: Pulmonary effort is normal.      Breath sounds: Normal breath sounds. Abdominal:      General: There is distension. Palpations: Abdomen is soft. Musculoskeletal:      Right lower leg: No edema. Left lower leg: No edema. Skin:     Capillary Refill: Capillary refill takes less than 2 seconds. Comments: Irregular heel ulceration, superficial necrosis. Surrounding tenderness.    Neurological:      Mental Status: He is alert.    Psychiatric:         Mood and Affect: Mood normal.         Behavior: Behavior normal.

## 2023-08-02 NOTE — LETTER
23    Re: Cardiology  Clearance    Patient Name: Marlene Lackey   Patient : 1960   Patient MRN: [id number]   Patient Phone: [phone]     Dr. Parth Valles ,    Dr. Lashawn Alves MD is requesting clearance for above patient, prior to proceeding with angiogram .  Patient's procedure will be scheduled at [address] [place] [address] once clearance has been obtained. Patient will be given general anesthesia. We spoke with your office today regarding clearance request.   Sloane Ball has scheduled patient's clearance appointment for 23 at 9:20 AM in your Cardiology [place] office. Please fax your recommendations, including any medication recommendations, to [phone], [facility] nursing. Or route your recommendations to [facility] pool [facility] Clearance Pool [[id number]]. Please reach out with any questions or concerns.     Sincerely,    [facility]

## 2023-08-02 NOTE — TELEPHONE ENCOUNTER
REMINDER: Under Reason For Call, comments MUST be formatted as:   (Surgeon's Initials) / (Procedure)      Special Instructions / FYI: Cardiac Clearance [facility] for 8/8 @ 9:20am Dr. Candace Noble at the [facility] office. Procedure: Angiogram     Level: 3 - Route clearance(s) to [facility] Clearance Pool     Allergies: Patient has no known allergies. Instructions Given: Angiogram Instructions and NO Bowel Prep General Instructions     [facility] // Days: Monday, Wednesday, and Friday    Return Visit Required Prior to Procedure: No.    Consent: I certify that patient has signed, printed, timed, and dated their surgery consent. I certify that the patient's LEGAL NAME and DATE OF BIRTH are written in the upper left corner on BOTH sides of the consent. I certify that BOTH sides of the completed surgery consent have been scanned into the patient's Epic chart by myself on 8/2/2023. Yes, I have LABELED the consent in Epic as Consent for Vascular Procedure. For Surgical Clearances     Levels   1-3   ROUTE this encounter to [facility] Clearance Pool (AND)   [facility] Surgery Coordinator Pool     Level   4   ROUTE this encounter to [facility] Surgery Coordinator Pool       HYDRATION CLEARANCES   ONLY ROUTE TO  [facility] Clearance Pool     (1) ONE CLEARANCE NEEDED - Cardiology  Clearance // Clearing Provider's Name (Jm Salcedo): [facility] //  Spoke with: Andi Bucio  //  Office Contact Information P: [phone] - F: [phone]    Yes, I have ROUTED this encounter to [facility] Surgery Coordinator and/or [facility] Clearance Pool.

## 2023-08-02 NOTE — ASSESSMENT & PLAN NOTE
Left heel ulcer is not improving for about 3 months. Risk factors of diabetes, ESRD on HD   Arterial duplex shows tibioperoneal disease. Its in the posterior tibial distribution, we should proceed with arteriogram and possible intervention to optimize flow.       Operative Scheduling Information:    Hospital:  [facility]    Physician:  Mandy    Surgery: Left leg angiogram with intervention    Urgency:  Standard    Level:  Level 3: Outpatients to be scheduled for elective surgery than can be delayed up to 4 weeks without reasonable expectation of detriment to patient    Case Length:  Normal    Post-op Bed:  Outpatient    OR Table:  Discovery    Equipment Needs:  None    Medication Instructions:  Aspirin:   Continue (do not hold)  Other: effient  Continue (do not hold)    Hydration:  No    Contrast Allergy:  no

## 2023-08-08 ENCOUNTER — PREP FOR PROCEDURE (OUTPATIENT)
Dept: VASCULAR SURGERY | Facility: CLINIC | Age: 63
End: 2023-08-08

## 2023-08-08 ENCOUNTER — OFFICE VISIT (OUTPATIENT)
Dept: CARDIOLOGY CLINIC | Facility: CLINIC | Age: 63
End: 2023-08-08
Payer: MEDICARE

## 2023-08-08 VITALS
WEIGHT: 211 LBS | SYSTOLIC BLOOD PRESSURE: 112 MMHG | OXYGEN SATURATION: 100 % | DIASTOLIC BLOOD PRESSURE: 67 MMHG | HEIGHT: 69 IN | HEART RATE: 61 BPM | BODY MASS INDEX: 31.25 KG/M2

## 2023-08-08 DIAGNOSIS — Z99.2 ESRD (END STAGE RENAL DISEASE) ON DIALYSIS (HCC): ICD-10-CM

## 2023-08-08 DIAGNOSIS — I25.10 CORONARY ARTERY DISEASE INVOLVING NATIVE CORONARY ARTERY OF NATIVE HEART WITHOUT ANGINA PECTORIS: Primary | ICD-10-CM

## 2023-08-08 DIAGNOSIS — N18.6 ESRD (END STAGE RENAL DISEASE) ON DIALYSIS (HCC): ICD-10-CM

## 2023-08-08 DIAGNOSIS — I25.5 ISCHEMIC CARDIOMYOPATHY: ICD-10-CM

## 2023-08-08 DIAGNOSIS — Z01.810 PREOP CARDIOVASCULAR EXAM: ICD-10-CM

## 2023-08-08 DIAGNOSIS — I10 PRIMARY HYPERTENSION: ICD-10-CM

## 2023-08-08 DIAGNOSIS — Z95.5 S/P CORONARY ARTERY STENT PLACEMENT: ICD-10-CM

## 2023-08-08 DIAGNOSIS — E78.2 MIXED HYPERLIPIDEMIA: ICD-10-CM

## 2023-08-08 DIAGNOSIS — L97.421 ULCER OF LEFT HEEL, LIMITED TO BREAKDOWN OF SKIN (HCC): ICD-10-CM

## 2023-08-08 PROCEDURE — 99214 OFFICE O/P EST MOD 30 MIN: CPT | Performed by: INTERNAL MEDICINE

## 2023-08-08 NOTE — TELEPHONE ENCOUNTER
Received cardiac clearance. Per OV note of Dr. Florencio Beck, 8/8/23:    • No cardiac contraindications to proceed with planned procedure as high cardiac risk for a relatively lower risk vascular procedure. In regards to renal transplantation, he is certainly also high risk for that surgery but again there are no clear cardiac contraindications to him proceeding. Although the patient's risk profile is certainly above average for similar patients his age due to multiple comorbid cardiac conditions, I do not feel that his risk profile is prohibitive for either surgery. I do not anticipate that additional testing would further optimize his risk profile from a cardiac standpoint at this time. Local or spinal anesthesia should be considered when able. Care should be taken with volume resuscitation due to known ischemic cardiomyopathy and moderately reduced LV systolic function.     • Mona-operative cardiac medication management  ? Anticoagulation/anti-platelets  - Continue aspirin and prasugrel. ?  Continue beta-blockers mona-operatively

## 2023-08-08 NOTE — PATIENT INSTRUCTIONS
You were seen today in the Cardiology office for follow up evaluation. Please continue your current cardiac medications as prescribed. Thank you for choosing [facility]. Please call our office or use [facility] with any questions.

## 2023-08-08 NOTE — PROGRESS NOTES
[facility]    Name:Asad Portillo   DOS: 8/8/2023     Chief Complaint:   Chief Complaint   Patient presents with   • Follow-up     Patient here for cardiac clearance for foot surgery. • Dizziness       HISTORY OF PRESENT ILLNESS:    HPI:  Gina Clark is a 61 y.o. male. He  has a past medical history of Cerebrovascular accident (CVA) due to thrombosis of left middle cerebral artery ([address]) (07/29/2018), Chronic kidney disease, Diabetes mellitus ([address]), Dialysis patient ([address]), GERD (gastroesophageal reflux disease), Hypercholesteremia, Hyperlipidemia, Hypertension, Infectious viral hepatitis, Neuropathy, Obesity, Osteomyelitis ([address]), PVC's (premature ventricular contractions), Stroke ([address]), and TIA (transient ischemic attack) (10/28/2018). He presents for follow up evaluation and for cardiac risk assessment prior to planned lower extremity vascular surgery/procedure. He last saw me in the office on 5/3/23, and last saw Dr Mi Beck for advanced heart failure on 7/14/23. Per prior OV notes:  He was admitted January 29, 2023 due to sudden onset of shortness of breath also with chest pressure/pain. He was in acute respiratory failure requiring BiPAP in the ED and is eventually transferred to the ICU. He was in pulmonary edema. He underwent volume removal with renal replacement therapy. He was also found to have elevated troponins and was started on heparin drip. Echocardiogram showed reduction in LVEF to 25 to 30%. Last known EF prior was normal.  He was transferred to [facility] to undergo cardiac catheterization and was found to have progression of coronary artery disease compared to cardiac catheterization in May 2022. He had in-stent stenosis of the LAD status post PCI/stent. He had occluded OM stent which could not be wired thus unable to revascularize. Also with occlusion of the RPAV branch.   Patient placed on medical therapy and discharged on a LifeVest.  He was unable to tolerate Entresto in the past due to low blood pressure. Subsequent echocardiogram showed improvement in LVEF with echo in February 2023 at [facility] showing EF of 35 to 40%. Repeat echo on 4/24/2023 showed EF of 35%. Refer to Dr. Karina Cespedes for ICD evaluation. Deemed that he is going to be at high risk of infection due to hemodialysis and possible immunosuppression when he gets renal transplant. Current osteomyelitis also a concern. Patient referred to heart failure service for further optimization of medical therapy and if he would be a candidate for Entresto or SGLT2 inhibitor, with the hope of improving LVEF more than 35% and not need defibrillator placement. Today, he reports no active cardiopulmonary complaints and states that he is been doing quite well. He specifically denies chest pain at rest and with exertion, shortness of breath, diaphoresis, dizziness, palpitations, edema, syncope. Was referred to follow-up sooner with me by his vascular surgeon Dr. Randine Lanes who plans to perform a left leg angiogram with intervention at a time to be determined. The indication is a ulcer on his left heel with skin breakdown. He has been compliant with all medications as prescribed. The only change is that he takes Entresto only once daily due to subjective dizziness. He is scheduled for a repeat transthoracic echo in September as ordered by Dr. Roberto Merino. ROS    ROS: Pertinent positives and negatives as described in History of Present Illness. Remainder of a 14 point review of systems was negative.      No Known Allergies     Current Outpatient Medications on File Prior to Visit   Medication Sig Dispense Refill   • aspirin (ECOTRIN LOW STRENGTH) 81 mg EC tablet Take 81 mg by mouth daily Resume on 8/14       • atorvastatin (LIPITOR) 40 mg tablet Take 1 tablet (40 mg total) by mouth daily with dinner 90 tablet 1   • Blood Glucose Monitoring Suppl (True Metrix Meter) w/Device KIT Use to test blood sugars 3 times daily 1 kit 0   • Blood Pressure Monitoring (BLOOD PRESSURE CUFF) MISC Use to check blood pressure before taking blood pressure medication and 1 hour after and follow instructions provided in discharge instructions based on the readings. 1 each 0   • D3-50 1.25 MG (01827 UT) capsule TAKE 1 CAPSULE BY MOUTH ONE TIME PER WEEK 12 capsule 2   • divalproex sodium (DEPAKOTE SPRINKLE) 125 MG capsule TAKE 2 CAPSULES (250 MG TOTAL) BY MOUTH EVERY 12 (TWELVE) HOURS 360 capsule 1   • glucose blood (True Metrix Blood Glucose Test) test strip Use 1 each 3 (three) times a day Use as instructed 300 strip 1   • metoprolol succinate (TOPROL-XL) 50 mg 24 hr tablet Take 1 tablet (50 mg total) by mouth 2 (two) times a day 180 tablet 3   • midodrine (PROAMATINE) 2.5 mg tablet Take 1 tablet (2.5 mg total) by mouth as needed (for SBP<90 on dialysis days) 30 tablet 0   • ONETOUCH DELICA LANCETS 19K MISC by Does not apply route 3 (three) times a day 270 each 1   • prasugrel (EFFIENT) tablet Take 1 tablet (5 mg total) by mouth daily 90 tablet 3   • sacubitril-valsartan (Entresto) 24-26 MG TABS Take 1 tablet by mouth in the morning (Patient taking differently: Take 1 tablet by mouth in the morning Bedtime) 30 tablet 0   • Sevelamer Carbonate 2.4 g PACK VIGOROUSLY MIX CONTENTS OF 1 PACKET IN WATER (IT WILL NOT DISSOLVE) AND DRINK 3 TIMES DAILY WITH MEALS     • collagenase (SANTYL) ointment Apply topically daily (Patient not taking: Reported on 8/8/2023) 90 g 0   • fluticasone (FLONASE) 50 mcg/act nasal spray 1 spray into each nostril daily (Patient not taking: Reported on 8/8/2023) 9.9 mL 0     No current facility-administered medications on file prior to visit.        Past Medical History:   Diagnosis Date   • Cerebrovascular accident (CVA) due to thrombosis of left middle cerebral artery ([address]) 07/29/2018   • Chronic kidney disease    • Diabetes mellitus ([address])    • Dialysis patient [facility])     M-W-F   • GERD (gastroesophageal reflux disease)    • Hypercholesteremia    • Hyperlipidemia    • Hypertension    • 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   • Neuropathy    • Obesity    • Osteomyelitis ([address])     last assessed 11/4/16   • PVC's (premature ventricular contractions)     sees cardiology Dr. Mannie Curling singh   • Stroke [facility])     last weeof July 2018 [facility]   • TIA (transient ischemic attack) 10/28/2018       Past Surgical History:   Procedure Laterality Date   • ABDOMINAL SURGERY     • CARDIAC CATHETERIZATION N/A 5/2/2022    Procedure: Cardiac Coronary Angiogram;  Surgeon: Elizabeth Moreira MD;  Location: [facility] CARDIAC CATH LAB; Service: Cardiology   • CARDIAC CATHETERIZATION N/A 5/2/2022    Procedure: Cardiac pci;  Surgeon: Elizabeth Moreira MD;  Location: [facility] CARDIAC CATH LAB; Service: Cardiology   • CARDIAC CATHETERIZATION  2/1/2023    Procedure: Cardiac catheterization;  Surgeon: Elizabeth Moreira MD;  Location: [facility] CARDIAC CATH LAB; Service: Cardiology   • CARDIAC CATHETERIZATION N/A 2/1/2023    Procedure: Cardiac pci;  Surgeon: Elizabeth Moreira MD;  Location: [facility] CARDIAC CATH LAB; Service: Cardiology   • CARDIAC CATHETERIZATION N/A 2/1/2023    Procedure: Cardiac Coronary Angiogram;  Surgeon: Elizabeth Moreira MD;  Location: [facility] CARDIAC CATH LAB; Service: Cardiology   • CARDIAC CATHETERIZATION N/A 2/1/2023    Procedure: Cardiac other-IVUS;  Surgeon: Elizabeth Moreira MD;  Location: [facility] CARDIAC CATH LAB; Service: Cardiology   • CHOLECYSTECTOMY      Percutaneous   • COLONOSCOPY     • CYSTOSCOPY     • OTHER SURGICAL HISTORY      "stimulator to control bowel movements"   • NH ESOPHAGOGASTRODUODENOSCOPY TRANSORAL DIAGNOSTIC N/A 9/27/2016    Procedure: ESOPHAGOGASTRODUODENOSCOPY (EGD); Surgeon: Shiraz Tinajero MD;  Location: [facility] GI LAB;   Service: Gastroenterology   • NH LAPAROSCOPY SURG CHOLECYSTECTOMY N/A 2/29/2016    Procedure: LAPAROSCOPIC CHOLECYSTECTOMY ;  Surgeon: Leola Ashraf DO;  Location: [facility] Main OR;  Service: General   • ROTATOR CUFF REPAIR Right    • TOE AMPUTATION Right 10/28/2016    Procedure: 3RD TOE AMPUTATION ;  Surgeon: Demi Carroll DPM;  Location: [facility] Main OR;  Service:        Family History   Problem Relation Age of Onset   • Leukemia Mother    • Liver disease Mother    • Lung cancer Mother         heavy smoker - 3 ppd   • Heart disease Father    • Liver disease Father    • Diabetes type I Father    • Multiple myeloma Sister    • Breast cancer Sister    • Urolithiasis Family    • Alcohol abuse Neg Hx    • Depression Neg Hx    • Drug abuse Neg Hx    • Substance Abuse Neg Hx    • Mental illness Neg Hx        Social History     Socioeconomic History   • Marital status: /Civil Union     Spouse name: Not on file   • Number of children: Not on file   • Years of education: Not on file   • Highest education level: Not asked   Occupational History   • Occupation: disabled   Tobacco Use   • Smoking status: Never   • Smokeless tobacco: Never   Vaping Use   • Vaping Use: Never used   Substance and Sexual Activity   • Alcohol use: Not Currently   • Drug use: No   • Sexual activity: Not Currently   Other Topics Concern   • Not on file   Social History Narrative    Daily caffeine consumption 2-3 servings a day     Social Determinants of Health     Financial Resource Strain: Unknown (7/13/2023)    Overall Financial Resource Strain (CARDIA)    • Difficulty of Paying Living Expenses: Patient refused   Food Insecurity: No Food Insecurity (2/1/2023)    Hunger Vital Sign    • Worried About Running Out of Food in the Last Year: Never true    • Ran Out of Food in the Last Year: Never true   Transportation Needs: No Transportation Needs (7/13/2023)    PRAPARE - Transportation    • Lack of Transportation (Medical): No    • Lack of Transportation (Non-Medical): No   Physical Activity: Not on file   Stress: No Stress Concern Present (1/18/2019)    [facility]    • Feeling of Stress :  Only a little   Social Connections: Unknown (1/18/2019)    Social Connection and Isolation Panel [NHANES]    • Frequency of Communication with Friends and Family: Not on file    • Frequency of Social Gatherings with Friends and Family: Not on file    • Attends Restorationism Services: Not on file    • Active Member of Clubs or Organizations: Not on file    • Attends Club or Organization Meetings: Not on file    • Marital Status:    Intimate Partner Violence: Not At Risk (1/18/2019)    Humiliation, Afraid, Rape, and Kick questionnaire    • Fear of Current or Ex-Partner: No    • Emotionally Abused: No    • Physically Abused: No    • Sexually Abused: No   Housing Stability: Low Risk  (2/1/2023)    Housing Stability Vital Sign    • Unable to Pay for Housing in the Last Year: No    • Number of Places Lived in the Last Year: 1    • Unstable Housing in the Last Year: No       OBJECTIVE:    /67 (BP Location: Right arm, Patient Position: Sitting, Cuff Size: Standard) Comment: omron Machine  Pulse 61   Ht 5' 9" (1.753 m)   Wt 95.7 kg (211 lb)   SpO2 100%   BMI 31.16 kg/m²      BP Readings from Last 3 Encounters:   08/08/23 112/67   08/02/23 108/62   07/20/23 128/84       Wt Readings from Last 3 Encounters:   08/08/23 95.7 kg (211 lb)   08/02/23 95.3 kg (210 lb)   07/25/23 93.9 kg (207 lb)         Physical Exam  Vitals reviewed. Constitutional:       General: He is not in acute distress. Appearance: Normal appearance. He is not diaphoretic. Comments: Objectively appears improved compared to prior assessments. HENT:      Head: Normocephalic and atraumatic. Eyes:      Conjunctiva/sclera: Conjunctivae normal.   Neck:      Vascular: No carotid bruit or JVD. Cardiovascular:      Rate and Rhythm: Normal rate and regular rhythm. Pulses: Normal pulses. Heart sounds: Normal heart sounds. No murmur heard. No friction rub. No gallop.    Pulmonary:      Effort: Pulmonary effort is normal.      Breath sounds: Normal breath sounds. No wheezing, rhonchi or rales. Abdominal:      General: Abdomen is flat. Bowel sounds are normal. There is no distension. Palpations: Abdomen is soft. Musculoskeletal:      Right lower leg: No edema. Left lower leg: No edema. Skin:     General: Skin is warm and dry. Comments: LUE AV fistula   Neurological:      General: No focal deficit present. Mental Status: He is alert and oriented to person, place, and time. Psychiatric:         Mood and Affect: Mood normal.         Behavior: Behavior normal.                                                       Cardiac testing:     Results for orders placed during the hospital encounter of 10/05/20    Echo complete with contrast if indicated    Narrative  [address] [place] [address], [facility]  [phone]    Transthoracic Echocardiogram  2D, M-mode, Doppler, and Color Doppler    Study date:  06-Oct-2020    Patient: Van Alvarez  MR number: [id number]  Account number: [de-identified]  : 1960  Age: 61 years  Gender: Male  Status: Inpatient  Location: Bedside  Height: 70 in  Weight: 239.6 lb  BP: 165/ 80 mmHg    Indications: Shortness of breath. Diagnoses: R06.02 - Shortness of breath    Sonographer:  Kassy Moura RDCS  Primary Physician:  Malik Moreland DO  Referring Physician:  Bert Cuevas MD  Group:  [facility]  Interpreting Physician:  Riya Awan MD    SUMMARY    LEFT VENTRICLE:  Systolic function was normal by visual assessment. Ejection fraction was estimated to be 60 %. There were no regional wall motion abnormalities. Wall thickness was moderately increased. Features were consistent with a pseudonormal left ventricular filling pattern, with concomitant abnormal relaxation and increased filling pressure (grade 2 diastolic dysfunction). LEFT ATRIUM:  The atrium was mildly dilated. RIGHT ATRIUM:  The atrium was mildly dilated.     MITRAL VALVE:  There was moderate annular calcification. There was mild regurgitation. AORTIC VALVE:  The valve was trileaflet. Leaflets exhibited normal thickness, moderate calcification, and moderately reduced cuspal separation. Transaortic velocity was increased due to valvular stenosis. There was mild to moderate stenosis. There was mild regurgitation. TRICUSPID VALVE:  There was trace regurgitation. PULMONIC VALVE:  There was mild regurgitation. HISTORY: PRIOR HISTORY: DM2. CKD4. Hypertension. CVA. GERD. Dementia. PROCEDURE: The procedure was performed at the bedside. This was a routine study. The transthoracic approach was used. The study included complete 2D imaging, M-mode, complete spectral Doppler, and color Doppler. The heart rate was 98 bpm,  at the start of the study. Image quality was adequate. LEFT VENTRICLE: Size was normal. Systolic function was normal by visual assessment. Ejection fraction was estimated to be 60 %. There were no regional wall motion abnormalities. Wall thickness was moderately increased. DOPPLER: The ratio  of early ventricular filling to atrial contraction velocities was within the normal range. Features were consistent with a pseudonormal left ventricular filling pattern, with concomitant abnormal relaxation and increased filling pressure  (grade 2 diastolic dysfunction). RIGHT VENTRICLE: The size was normal. Systolic function was normal. DOPPLER: Systolic pressure was not estimated. LEFT ATRIUM: The atrium was mildly dilated. RIGHT ATRIUM: The atrium was mildly dilated. MITRAL VALVE: There was moderate annular calcification. Valve structure was normal. There was normal leaflet separation. DOPPLER: The transmitral velocity was within the normal range. There was no evidence for stenosis. There was mild  regurgitation. AORTIC VALVE: The valve was trileaflet. Leaflets exhibited normal thickness, moderate calcification, and moderately reduced cuspal separation.  DOPPLER: Transaortic velocity was increased due to valvular stenosis. There was mild to moderate  stenosis. There was mild regurgitation. TRICUSPID VALVE: The valve structure was normal. There was normal leaflet separation. DOPPLER: The transtricuspid velocity was within the normal range. There was no evidence for stenosis. There was trace regurgitation. PULMONIC VALVE: Leaflets exhibited normal thickness, no calcification, and normal cuspal separation. DOPPLER: The transpulmonic velocity was within the normal range. There was mild regurgitation. PERICARDIUM: There was no pericardial effusion. AORTA: The root exhibited normal size. SYSTEMIC VEINS: IVC: The inferior vena cava was normal in size and course. Respirophasic changes were normal.    SYSTEM MEASUREMENT TABLES    2D  %FS: 36.09 %  Ao Diam: 3.8 cm  EDV(Teich): 182.77 ml  EF(Teich): 64.8 %  ESV(Teich): 64.33 ml  IVSd: 1.57 cm  LA Area: 24.31 cm2  LA Diam: 4.63 cm  LVEDV MOD A4C: 192.34 ml  LVEF MOD A4C: 65.32 %  LVESV MOD A4C: 66.7 ml  LVIDd: 6.04 cm  LVIDs: 3.86 cm  LVLd A4C: 9.02 cm  LVLs A4C: 7.15 cm  LVOT Diam: 2.21 cm  LVPWd: 1.61 cm  RA Area: 17.56 cm2  RVIDd: 4 cm  SV MOD A4C: 125.64 ml  SV(Teich): 118.44 ml    CW  AV Env. Ti: 330.16 ms  AV MaxP.64 mmHg  AV VTI: 57.9 cm  AV Vmax: 2.58 m/s  AV Vmean: 1.76 m/s  AV meanP.38 mmHg  TR MaxP.37 mmHg  TR Vmax: 2.71 m/s    MM  TAPSE: 1.84 cm    PW  FATUMA (VTI): 1.27 cm2  FATUMA Vmax: 1.42 cm2  AVAI (VTI): 0 cm2/m2  AVAI Vmax: 0 cm2/m2  E' Sept: 0.07 m/s  E/E' Sept: 12.69  LVOT Env. Ti: 335.87 ms  LVOT VTI: 19.11 cm  LVOT Vmax: 0.95 m/s  LVOT Vmean: 0.57 m/s  LVOT maxPG: 3.63 mmHg  LVOT meanP.61 mmHg  LVSI Dopp: 32.42 ml/m2  LVSV Dopp: 73.27 ml  MV A [place]: 0.64 m/s  MV Dec [place]: 4.7 m/s2  MV DecT: 189.67 ms  MV E [place]: 0.89 m/s  MV E/A Ratio: 1.38  MV PHT: 55.01 ms  MVA By PHT: 4 cm2    [facility] Accredited Echocardiography Laboratory    Prepared and electronically signed by    Phoebe Mane MD  Signed 06-Oct-2020 16:31:49        LABS:  Lab Results   Component Value Date    GLUCOSE 142 (H) 01/30/2023    BUN 41 (H) 07/16/2023    CREATININE 7.59 (H) 07/16/2023    CALCIUM 8.9 07/16/2023     08/10/2016    K 4.5 07/16/2023    CO2 31 07/16/2023     07/16/2023    ALKPHOS 88 07/16/2023    BILITOT 0.6 08/10/2016    PROT 6.7 08/10/2016    AST 6 (L) 07/16/2023    ALT 6 (L) 07/16/2023    ANIONGAP 9 01/03/2016        Lab Results   Component Value Date    WBC 5.55 07/16/2023    HGB 10.5 (L) 07/16/2023    HCT 34.8 (L) 07/16/2023     (H) 07/16/2023     (L) 07/16/2023       Lab Results   Component Value Date    CHOL 203 (H) 08/10/2016    HDL 30 (L) 01/30/2023    LDLCALC 21 01/30/2023    TRIG 123 01/30/2023       Lab Results   Component Value Date    HGBA1C 4.9 04/18/2023       ASSESSMENT/PLAN:  Diagnoses and all orders for this visit:    Coronary artery disease involving native coronary artery of native heart without angina pectoris  S/P coronary artery stent placement  Ischemic cardiomyopathy  Continue aspirin, prasugrel, once daily Entresto, once daily Toprol-XL. Further GDMT is limited by renal function. He has no anginal complaints. Primary hypertension    Mixed hyperlipidemia    ESRD (end stage renal disease) on dialysis [facility])      Preop cardiovascular exam  Ulcer of left heel, limited to breakdown of skin ([address])  · Planned procedure: Lower extremity angiogram with possible intervention. Eventual kidney transplantation. · Active cardiac complaints: None  · Cardiac co-morbidities: CAD s/p PCI, ischemic cardiomyopathy, ESRD on HD, hyperlipidemia  · Functional status: Acceptable  · Able to walk 4 blocks without symptoms?: Yes  · Able to climb 2 flights of stairs without symptoms?: Yes  · Vitals - reviewed and stable  · ECG 7/16/23 -normal sinus rhythm. Right bundle branch block. Left posterior fascicular block.   Nonspecific T wave abnormality in the inferior leads.  · No cardiac contraindications to proceed with planned procedure as high cardiac risk for a relatively lower risk vascular procedure. In regards to renal transplantation, he is certainly also high risk for that surgery but again there are no clear cardiac contraindications to him proceeding. Although the patient's risk profile is certainly above average for similar patients his age due to multiple comorbid cardiac conditions, I do not feel that his risk profile is prohibitive for either surgery. I do not anticipate that additional testing would further optimize his risk profile from a cardiac standpoint at this time. Local or spinal anesthesia should be considered when able. Care should be taken with volume resuscitation due to known ischemic cardiomyopathy and moderately reduced LV systolic function. · Mona-operative cardiac medication management  · Anticoagulation/anti-platelets  · Continue aspirin and prasugrel. · Continue beta-blockers mona-operatively                Shawnee Olivares MD      Portions of the record may have been created with voice recognition software. Occasional wrong word or "sound alike" substitutions may have occurred due to the inherent limitations of voice recognition software. Please review the chart carefully and recognize, using context, where substitutions/typographical errors may have occurred.

## 2023-08-08 NOTE — TELEPHONE ENCOUNTER
rec call from Todd Caitlin this morning stating he just left the cardiology office and is cleared for his agram and wishes to schedule after 9/15/23. Patient is confirmed for 9/29/23. Cardiology note not yet signed and will call patient if needed after f/u w/cardiology. Verified patient's insurance   CONFIRMED - Patient's insurance is Medicare  Is patient requesting a call when authorization has been obtained? Patient did not request a call. Surgery Date: 9/29/23 Patient was offered sooner dates but declined. Needed after Sept 15th. Primary Surgeon: Maureen Fernandez (NPI: [id number])  Assisting Surgeon: Not Applicable (N/A)  Facility: [facility] (Tax: [id number] / NPI: [id number])  Inpatient / Outpatient: Outpatient  Level: 3    Clearance Received: Yes, Cardiology . Consent Received: Yes, scanned into Epic on 8/2/23. Medication Hold / Last Dose: Other: effient  Continue (do not hold)  Aspirin:   Continue (do not hold)  IR Notified: Not Applicable (N/A)  Rep. Notified: Not Applicable (N/A)  Equipment Needs: Not Applicable (N/A)  Vas Lab Requested: Not Applicable (N/A)  Patient Contacted: 8/8/23    Diagnosis: L97.421 Ulcer of left heel, limited to breakdown of skin    Procedure/ CPT Code(s): Angiogram // CPT: 91028, 84296 and 56638 // Procedure to take place in OR [Auth/ Cert Based]    For varicose vein related procedures:   Last LEVDR: Not Applicable (N/A)  CEAP Classification: Not Applicable (N/A)  VCSS: Not Applicable (N/A)    Post Operative Date/ Time: 10/11/23 , Vergia Shiver with Chet Cosme (NPI: [id number])     *Please review medication hold(s), PATs, and check H&P with patient. *  PATIENT WAS MAILED SURGERY/SHOWERING/DISCHARGE/COVID INSTRUCTIONS AFTER REVIEWING WITH THEM VIA PHONE CALL.   patient was mailed scripts 8/8/23

## 2023-08-10 ENCOUNTER — TELEPHONE (OUTPATIENT)
Dept: CARDIOLOGY CLINIC | Facility: CLINIC | Age: 63
End: 2023-08-10

## 2023-08-10 NOTE — TELEPHONE ENCOUNTER
Ozzie Novoa looking to reach Dr Elvia Nova. My name is Guillermina Domínguez. My father Fabiola Coyne is a patient of his and I needed to touch base with him following our appointment with the kidney transplant team today. If you can please give me a call back at his earliest convenience. My phone number [phone]. Thank you.

## 2023-08-21 DIAGNOSIS — I25.5 ISCHEMIC CARDIOMYOPATHY: ICD-10-CM

## 2023-08-21 NOTE — TELEPHONE ENCOUNTER
Son Stefan Bray called again asking to speak with you. I tried to intercept, but he would like to speak with you directly.    [phone]

## 2023-08-22 ENCOUNTER — OFFICE VISIT (OUTPATIENT)
Dept: PODIATRY | Facility: CLINIC | Age: 63
End: 2023-08-22
Payer: MEDICARE

## 2023-08-22 VITALS — BODY MASS INDEX: 31.25 KG/M2 | HEIGHT: 69 IN | WEIGHT: 211 LBS

## 2023-08-22 DIAGNOSIS — I73.9 PERIPHERAL ARTERIAL DISEASE (HCC): ICD-10-CM

## 2023-08-22 DIAGNOSIS — L97.421 ULCER OF LEFT HEEL, LIMITED TO BREAKDOWN OF SKIN (HCC): Primary | ICD-10-CM

## 2023-08-22 DIAGNOSIS — E11.42 DIABETIC POLYNEUROPATHY ASSOCIATED WITH TYPE 2 DIABETES MELLITUS (HCC): ICD-10-CM

## 2023-08-22 PROCEDURE — 99213 OFFICE O/P EST LOW 20 MIN: CPT | Performed by: PODIATRIST

## 2023-08-22 RX ORDER — SACUBITRIL AND VALSARTAN 24; 26 MG/1; MG/1
1 TABLET, FILM COATED ORAL DAILY
Qty: 30 TABLET | Refills: 11 | Status: SHIPPED | OUTPATIENT
Start: 2023-08-22

## 2023-08-22 NOTE — PROGRESS NOTES
PATIENT:  Carlos Hill    1960      ASSESSMENT:     1. Ulcer of left heel, limited to breakdown of skin ([address])        2. Diabetic polyneuropathy associated with type 2 diabetes mellitus ([address])        3. Peripheral arterial disease ([address])            PLAN:  1. Reviewed medical records. Reviewed the notes from vascular surgery. Patient was counseled on the condition and diagnosis. 2. Wound is stable with eschar. Continue betadine. Continue Globopedi shoe. Instructed proper off-loading in bed with Prevalon boot. 3. Awaits a-gram.    4. He will return in 2 weeks. I have spent a total time of 20 minutes on 08/22/23 in caring for this patient including Prognosis, Risks and benefits of tx options, Instructions for management, Patient and family education, Importance of tx compliance and Risk factor reductions. Subjective:      HPI:   The patients presents for evaluation of left heel ulcer. Wound looks stable. No redness. He presents with Globoped shoe today. BS under control. He was not compliant about wearing Prevalon boot in bed. No new complaint. The following portions of the patient's history were reviewed and updated as appropriate: allergies, current medications, past family history, past medical history, past social history, past surgical history and problem list.  All pertinent labs and images were reviewed.     Past Medical History  Past Medical History:   Diagnosis Date   • Cerebrovascular accident (CVA) due to thrombosis of left middle cerebral artery ([address]) 07/29/2018   • Chronic kidney disease    • Diabetes mellitus ([address])    • Dialysis patient [facility])     M-W-F   • GERD (gastroesophageal reflux disease)    • Hypercholesteremia    • Hyperlipidemia    • Hypertension    • 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   • Neuropathy    • Obesity    • Osteomyelitis ([address])     last assessed 11/4/16   • PVC's (premature ventricular contractions)     sees cardiology Dr. Victorina Washington camargo   • Stroke [facility])     last weeof July 2018 [facility]   • TIA (transient ischemic attack) 10/28/2018       Past Surgical History  Past Surgical History:   Procedure Laterality Date   • ABDOMINAL SURGERY     • CARDIAC CATHETERIZATION N/A 5/2/2022    Procedure: Cardiac Coronary Angiogram;  Surgeon: Adele Hatch MD;  [facility]; Service: Cardiology   • CARDIAC CATHETERIZATION N/A 5/2/2022    Procedure: Cardiac pci;  Surgeon: Adele Hatch MD;  [facility]; Service: Cardiology   • CARDIAC CATHETERIZATION  2/1/2023    Procedure: Cardiac catheterization;  Surgeon: Adele Hatch MD;  [facility] CARD[facility]; Service: Cardiology   • CARDIAC CATHETERIZATION N/A 2/1/2023    Procedure: Cardiac pci;  Surgeon: Adele Hatch MD;  [facility]; Service: Cardiology   • CARDIAC CATHETERIZATION N/A 2/1/2023    Procedure: Cardiac Coronary Angiogram;  Surgeon: Adele Hatch MD;  [facility]; Service: Cardiology   • CARDIAC CATHETERIZATION N/A 2/1/2023    Procedure: Cardiac other-IVUS;  Surgeon: Adele Hatch MD;  [facility]: [facility] CARD[facility]; Service: Cardiology   • CHOLECYSTECTOMY      Percutaneous   • COLONOSCOPY     • CYSTOSCOPY     • OTHER SURGICAL HISTORY      "stimulator to control bowel movements"   • [place] ESOPHAGOGASTRODUODENOSCOPY TRANSORAL DIAGNOSTIC N/A 9/27/2016    Procedure: ESOPHAGOGASTRODUODENOSCOPY (EGD); Surgeon: Rabia Luevano MD;  Location: [facility] GI LAB; Service: Gastroenterology   • [place] LAPAROSCOPY SURG CHOLECYSTECTOMY N/A 2/29/2016    Procedure: LAPAROSCOPIC CHOLECYSTECTOMY ;  Surgeon: Jess Wellington DO;  Location: [facility] Main OR;  Service: General   • ROTATOR CUFF REPAIR Right    • TOE AMPUTATION Right 10/28/2016    Procedure: 3RD TOE AMPUTATION ;  Surgeon: Tressa Lowe DPM;  Location: [facility];  Service:         Allergies:  Patient has no known allergies.     Medications:  Current Outpatient Medications Medication Sig Dispense Refill   • aspirin (ECOTRIN LOW STRENGTH) 81 mg EC tablet Take 81 mg by mouth daily Resume on 8/14       • atorvastatin (LIPITOR) 40 mg tablet Take 1 tablet (40 mg total) by mouth daily with dinner 90 tablet 1   • Blood Glucose Monitoring Suppl (True Metrix Meter) w/Device KIT Use to test blood sugars 3 times daily 1 kit 0   • Blood Pressure Monitoring (BLOOD PRESSURE CUFF) MISC Use to check blood pressure before taking blood pressure medication and 1 hour after and follow instructions provided in discharge instructions based on the readings. 1 each 0   • D3-50 1.25 MG (99384 UT) capsule TAKE 1 CAPSULE BY MOUTH ONE TIME PER WEEK 12 capsule 2   • divalproex sodium (DEPAKOTE SPRINKLE) 125 MG capsule TAKE 2 CAPSULES (250 MG TOTAL) BY MOUTH EVERY 12 (TWELVE) HOURS 360 capsule 1   • glucose blood (True Metrix Blood Glucose Test) test strip Use 1 each 3 (three) times a day Use as instructed 300 strip 1   • metoprolol succinate (TOPROL-XL) 50 mg 24 hr tablet Take 1 tablet (50 mg total) by mouth 2 (two) times a day 180 tablet 3   • midodrine (PROAMATINE) 2.5 mg tablet Take 1 tablet (2.5 mg total) by mouth as needed (for SBP<90 on dialysis days) 30 tablet 0   • ONETOUCH DELICA LANCETS 59B MISC by Does not apply route 3 (three) times a day 270 each 1   • prasugrel (EFFIENT) tablet Take 1 tablet (5 mg total) by mouth daily 90 tablet 3   • Sevelamer Carbonate 2.4 g PACK VIGOROUSLY MIX CONTENTS OF 1 PACKET IN WATER (IT WILL NOT DISSOLVE) AND DRINK 3 TIMES DAILY WITH MEALS     • collagenase (SANTYL) ointment Apply topically daily (Patient not taking: Reported on 8/8/2023) 90 g 0   • fluticasone (FLONASE) 50 mcg/act nasal spray 1 spray into each nostril daily (Patient not taking: Reported on 8/8/2023) 9.9 mL 0   • sacubitril-valsartan (Entresto) 24-26 MG TABS Take 1 tablet by mouth in the morning Bedtime 30 tablet 11     No current facility-administered medications for this visit.        Social History:  Social History     Socioeconomic History   • Marital status: /Civil Union     Spouse name: None   • Number of children: None   • Years of education: None   • Highest education level: Not asked   Occupational History   • Occupation: disabled   Tobacco Use   • Smoking status: Never   • Smokeless tobacco: Never   Vaping Use   • Vaping Use: Never used   Substance and Sexual Activity   • Alcohol use: Not Currently   • Drug use: No   • Sexual activity: Not Currently   Other Topics Concern   • None   Social History Narrative    Daily caffeine consumption 2-3 servings a day     Social Determinants of Health     Financial Resource Strain: Unknown (7/13/2023)    Overall Financial Resource Strain (CARDIA)    • Difficulty of Paying Living Expenses: Patient refused   Food Insecurity: No Food Insecurity (2/1/2023)    Hunger Vital Sign    • Worried About Running Out of Food in the Last Year: Never true    • Ran Out of Food in the Last Year: Never true   Transportation Needs: No Transportation Needs (7/13/2023)    PRAPARE - Transportation    • Lack of Transportation (Medical): No    • Lack of Transportation (Non-Medical): No   Physical Activity: Not on file   Stress: No Stress Concern Present (1/18/2019)    [address] [place] [address]    • Feeling of Stress :  Only a little   Social Connections: Unknown (1/18/2019)    Social Connection and Isolation Panel [NHANES]    • Frequency of Communication with Friends and Family: Not on file    • Frequency of Social Gatherings with Friends and Family: Not on file    • Attends Anabaptist Services: Not on file    • Active Member of Clubs or Organizations: Not on file    • Attends Club or Organization Meetings: Not on file    • Marital Status:    Intimate Partner Violence: Not At Risk (1/18/2019)    Humiliation, Afraid, Rape, and Kick questionnaire    • Fear of Current or Ex-Partner: No    • Emotionally Abused: No    • Physically Abused: No    • Sexually Abused: No   Housing Stability: Low Risk  (2/1/2023)    Housing Stability Vital Sign    • Unable to Pay for Housing in the Last Year: No    • Number of Places Lived in the Last Year: 1    • Unstable Housing in the Last Year: No        Review of Systems   Constitutional: Negative for chills and fever. Respiratory: Negative for cough and shortness of breath. Cardiovascular: Negative for chest pain. Gastrointestinal: Negative for constipation, diarrhea, nausea and vomiting. Neurological: Positive for numbness. Objective:      Ht 5' 9" (1.753 m)   Wt 95.7 kg (211 lb)   BMI 31.16 kg/m²          Physical Exam  Vitals reviewed. Constitutional:       General: He is not in acute distress. Appearance: He is well-developed. He is not toxic-appearing or diaphoretic. Cardiovascular:      Rate and Rhythm: Normal rate and regular rhythm. Pulses:           Dorsalis pedis pulses are 1+ on the right side and 0 on the left side. Posterior tibial pulses are 0 on the right side and 0 on the left side. Pulmonary:      Effort: Pulmonary effort is normal. No respiratory distress. Musculoskeletal:         General: Deformity present. No tenderness or signs of injury. Right foot: No foot drop. Left foot: No foot drop. Comments: Hammertoe deformity noted. 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. Feet:      Right foot:      Skin integrity: Dry skin present. No ulcer, skin breakdown, erythema, warmth or callus. Left foot:      Skin integrity: Dry skin present. No ulcer, skin breakdown, erythema, warmth or callus. Skin:     General: Skin is warm. Capillary Refill: Capillary refill takes less than 2 seconds. Coloration: Skin is not cyanotic or mottled. Findings: No ecchymosis. Nails: There is no clubbing. Comments: Ulcer presents left heel. It is covered with dry eschar. It is 1.0 X 1.0 cm. No cellulitis. Neurological:      General: No focal deficit present. Mental Status: He is alert and oriented to person, place, and time. Cranial Nerves: No cranial nerve deficit. Sensory: Sensory deficit present. Motor: No weakness. Psychiatric:         Mood and Affect: Mood normal.         Behavior: Behavior normal.         Thought Content:  Thought content normal.         Judgment: Judgment normal.

## 2023-08-23 NOTE — PATIENT INSTRUCTIONS
INSULIN DOSAGE INSTRUCTIONS    Name: Leo Khan                        : 1960  MRN #: [id number]    Your Current Insulin  and dose is: Before Breakfast Before Lunch Before Evening Meal Bedtime     Novolog Insulin   4 units     4 units   4 units    Regular, Apidra, Humalog orNovolog Sliding Scale:   <80              151-200 +1 +1 +1    201-250 +2 +2 +2 +   251-300 +3 +3 +3 +   301-350 +4 +4 +4 +   >350 +5 +5 +5 +       Lantus Insulin    14 units     Additional Instructions:   Please test your blood sugar:  _4_ Times per day  X_ Before Breakfast                _ Alternate Testing  X_ Before Lunch                _ 2 Hours After  Meal  X_ Before Evening Meal               _ 3 a m   x_ Before Bedtime Snack     Target Blood sugar range _80_to _180__  Call if your  blood sugar is less than _70_ or greater than _400__  Today's Date: 2022      Hypoglycemia instructions   Leo Khan  2022  [id number]    Low Blood Sugar    Steps to treat low blood sugar  1  Test blood sugar if you have symptoms of low blood sugar:   Low Blood Sugar Symptoms:  o Sweaty  o Dizzy  o Rapid heartbeat  o Shaky  o Bad mood  o Hungry      2  Treat blood sugar less than 70 with 15 grams of fast-acting carbohydrate:   Examples of 15 grams Fast-Acting Carbohydrate:  o 4 oz juice  o 4 oz regular soda  o 3-4 glucose tablets (chew)  o 3-4 hard candies (chew)          3  Wait 15 minutes and test your blood sugar again     4   If blood sugar is less than 100, repeat steps 2-3     5  When your blood sugar is 100 or more, eat a snack if it will be longer than one hour until your next meal  The snack should be 15 grams of carbohydrate and a protein:   Examples of snacks:  o ½ sandwich  o 6 crackers with cheese  o Piece of fruit with cheese or peanut butter  o 6 crackers with peanut butter
If you or your guardian/family is a smoker, it is important for your health to stop smoking.  Please be aware that second hand smoke is also harmful.

## 2023-08-25 ENCOUNTER — ANESTHESIA EVENT (OUTPATIENT)
Dept: PERIOP | Facility: HOSPITAL | Age: 63
End: 2023-08-25
Payer: MEDICARE

## 2023-09-13 NOTE — Clinical Note
Trapper balloon inserted Med rec clarified   -Cont. IV Keppra 1000 BID  -Cont. IV Vimpat IVPB 200 BID  -Cont. Valproic acid 250 mg PO via PEG TID  -F/u neurology recommendations

## 2023-09-14 ENCOUNTER — TELEPHONE (OUTPATIENT)
Dept: INTERNAL MEDICINE CLINIC | Facility: CLINIC | Age: 63
End: 2023-09-14

## 2023-09-14 NOTE — TELEPHONE ENCOUNTER
Do we have the blood type for edward? He is on the transplant list and they are asking for this?       His son called Karishma Ruma [phone]

## 2023-09-16 ENCOUNTER — APPOINTMENT (OUTPATIENT)
Dept: LAB | Facility: CLINIC | Age: 63
End: 2023-09-16
Payer: MEDICARE

## 2023-09-16 ENCOUNTER — OFFICE VISIT (OUTPATIENT)
Dept: LAB | Facility: CLINIC | Age: 63
End: 2023-09-16
Payer: MEDICARE

## 2023-09-16 DIAGNOSIS — L97.421 ULCER OF LEFT HEEL, LIMITED TO BREAKDOWN OF SKIN (HCC): ICD-10-CM

## 2023-09-16 LAB
ANION GAP SERPL CALCULATED.3IONS-SCNC: 8 MMOL/L
BUN SERPL-MCNC: 32 MG/DL (ref 5–25)
CALCIUM SERPL-MCNC: 9.5 MG/DL (ref 8.4–10.2)
CHLORIDE SERPL-SCNC: 98 MMOL/L (ref 96–108)
CO2 SERPL-SCNC: 35 MMOL/L (ref 21–32)
CREAT SERPL-MCNC: 5.44 MG/DL (ref 0.6–1.3)
ERYTHROCYTE [DISTWIDTH] IN BLOOD BY AUTOMATED COUNT: 15.1 % (ref 11.6–15.1)
GFR SERPL CREATININE-BSD FRML MDRD: 10 ML/MIN/1.73SQ M
GLUCOSE P FAST SERPL-MCNC: 108 MG/DL (ref 65–99)
HCT VFR BLD AUTO: 37 % (ref 36.5–49.3)
HGB BLD-MCNC: 11.7 G/DL (ref 12–17)
MCH RBC QN AUTO: 31.4 PG (ref 26.8–34.3)
MCHC RBC AUTO-ENTMCNC: 31.6 G/DL (ref 31.4–37.4)
MCV RBC AUTO: 99 FL (ref 82–98)
PLATELET # BLD AUTO: 109 THOUSANDS/UL (ref 149–390)
PMV BLD AUTO: 11.8 FL (ref 8.9–12.7)
POTASSIUM SERPL-SCNC: 4.9 MMOL/L (ref 3.5–5.3)
RBC # BLD AUTO: 3.73 MILLION/UL (ref 3.88–5.62)
SODIUM SERPL-SCNC: 141 MMOL/L (ref 135–147)
WBC # BLD AUTO: 4.92 THOUSAND/UL (ref 4.31–10.16)

## 2023-09-16 PROCEDURE — 36415 COLL VENOUS BLD VENIPUNCTURE: CPT

## 2023-09-16 PROCEDURE — 80048 BASIC METABOLIC PNL TOTAL CA: CPT

## 2023-09-16 PROCEDURE — 85027 COMPLETE CBC AUTOMATED: CPT

## 2023-09-16 PROCEDURE — 93005 ELECTROCARDIOGRAM TRACING: CPT

## 2023-09-19 NOTE — PRE-PROCEDURE INSTRUCTIONS
Pre-Surgery Instructions:   Medication Instructions   • aspirin (ECOTRIN LOW STRENGTH) 81 mg EC tablet Instructions provided by MD   • atorvastatin (LIPITOR) 40 mg tablet Take night before surgery   • D3-50 1.25 MG (85478 UT) capsule Hold day of surgery. takes on Sundays   • divalproex sodium (DEPAKOTE SPRINKLE) 125 MG capsule Take day of surgery. • metoprolol succinate (TOPROL-XL) 50 mg 24 hr tablet Take day of surgery. • midodrine (PROAMATINE) 2.5 mg tablet Uses PRN- DO NOT take day of surgerytakes if needed on dialysis days   • prasugrel (EFFIENT) tablet Instructions provided by MD   • sacubitril-valsartan (Entresto) 24-26 MG TABS Hold day of surgery. • Sevelamer Carbonate 2.4 g PACK Hold day of surgery. Per son Sam-pt usually takes medications with pudding---if he is unable to take with sip of water DOS --will wait till he gets home to take morning medications(son understood no food or drink after midnight the night before surgery-just small /sip water with meds if pt able)    Medication instructions for day surgery reviewed. Please use only a sip of water to take your instructed medications. Avoid all over the counter vitamins, supplements and NSAIDS for one week prior to surgery per anesthesia guidelines. Tylenol is ok to take as needed. You will receive a call one business day prior to surgery with an arrival time and hospital directions. If your surgery is scheduled on a Monday, the hospital will be calling you on the Friday prior to your surgery. If you have not heard from anyone by 8pm, please call the hospital supervisor through the hospital  at [phone]. Radha Aguirre [phone]). Do not eat or drink anything after midnight the night before your surgery, including candy, mints, lifesavers, or chewing gum. Do not drink alcohol 24hrs before your surgery. Try not to smoke at least 24hrs before your surgery.        Follow the pre surgery showering instructions as listed in the [facility] Surgical Experience Booklet” or otherwise provided by your surgeon's office. Do not shave the surgical area 24 hours before surgery. Do not apply any lotions, creams, including makeup, cologne, deodorant, or perfumes after showering on the day of your surgery. No contact lenses, eye make-up, or artificial eyelashes. Remove nail polish, including gel polish, and any artificial, gel, or acrylic nails if possible. Remove all jewelry including rings and body piercing jewelry. Wear causal clothing that is easy to take on and off. Consider your type of surgery. Keep any valuables, jewelry, piercings at home. Please bring any specially ordered equipment (sling, braces) if indicated. Arrange for a responsible person to drive you to and from the hospital on the day of your surgery. Visitor Guidelines discussed. Call the surgeon's office with any new illnesses, exposures, or additional questions prior to surgery. Please reference your [facility] Surgical Experience Booklet” for additional information to prepare for your upcoming surgery.

## 2023-09-20 LAB
ATRIAL RATE: 73 BPM
P AXIS: 73 DEGREES
PR INTERVAL: 176 MS
QRS AXIS: -32 DEGREES
QRSD INTERVAL: 156 MS
QT INTERVAL: 468 MS
QTC INTERVAL: 515 MS
T WAVE AXIS: 9 DEGREES
VENTRICULAR RATE: 73 BPM

## 2023-09-20 PROCEDURE — 93010 ELECTROCARDIOGRAM REPORT: CPT | Performed by: INTERNAL MEDICINE

## 2023-09-21 ENCOUNTER — HOSPITAL ENCOUNTER (OUTPATIENT)
Dept: NON INVASIVE DIAGNOSTICS | Facility: CLINIC | Age: 63
Discharge: HOME | End: 2023-09-21
Payer: MEDICARE

## 2023-09-21 VITALS
HEART RATE: 60 BPM | HEIGHT: 69 IN | WEIGHT: 212.96 LBS | DIASTOLIC BLOOD PRESSURE: 67 MMHG | SYSTOLIC BLOOD PRESSURE: 112 MMHG | BODY MASS INDEX: 31.54 KG/M2

## 2023-09-21 DIAGNOSIS — I25.5 ISCHEMIC CARDIOMYOPATHY: ICD-10-CM

## 2023-09-21 LAB
AORTIC ROOT: 3.3 CM
AORTIC VALVE MEAN VELOCITY: 16.8 M/S
APICAL FOUR CHAMBER EJECTION FRACTION: 54 %
ASCENDING AORTA: 3.9 CM
AV LVOT MEAN GRADIENT: 1 MMHG
AV LVOT PEAK GRADIENT: 2 MMHG
AV MEAN GRADIENT: 12 MMHG
AV PEAK GRADIENT: 22 MMHG
AV VALVE AREA: 1.36 CM2
AV VELOCITY RATIO: 0.3
DOP CALC AO PEAK VEL: 2.3 M/S
DOP CALC AO VTI: 59 CM
DOP CALC LVOT AREA: 4.91 CM2
DOP CALC LVOT DIAMETER: 2.5 CM
DOP CALC LVOT PEAK VEL VTI: 16.38 CM
DOP CALC LVOT PEAK VEL: 0.68 M/S
DOP CALC LVOT STROKE VOLUME: 80.36 CM3
INTERVENTRICULAR SEPTUM IN DIASTOLE (PARASTERNAL SHORT AXIS VIEW): 1.2 CM
INTERVENTRICULAR SEPTUM: 1.2 CM (ref 0.6–1.1)
LAAS-AP2: 29.9 CM2
LAAS-AP4: 29.3 CM2
LEFT ATRIUM AREA SYSTOLE SINGLE PLANE A4C: 31 CM2
LEFT ATRIUM VOLUME (MOD BIPLANE): 115 ML
LEFT VENTRICULAR INTERNAL DIMENSION IN DIASTOLE: 5.6 CM (ref 3.5–6)
LEFT VENTRICULAR POSTERIOR WALL IN END DIASTOLE: 1.2 CM
RIGHT ATRIUM AREA SYSTOLE A4C: 21.4 CM2
RIGHT VENTRICLE ID DIMENSION: 3.8 CM
SL CV LEFT ATRIUM LENGTH A2C: 6.8 CM
SL CV LV EF: 35
SL CV PED ECHO LEFT VENTRICLE DIASTOLIC VOLUME (MOD BIPLANE) 2D: 157 ML
TR MAX PG: 56 MMHG
TR PEAK VELOCITY: 3.7 M/S
TRICUSPID ANNULAR PLANE SYSTOLIC EXCURSION: 2.1 CM
TRICUSPID VALVE PEAK REGURGITATION VELOCITY: 3.73 M/S

## 2023-09-21 PROCEDURE — 93321 DOPPLER ECHO F-UP/LMTD STD: CPT

## 2023-09-21 PROCEDURE — 93308 TTE F-UP OR LMTD: CPT

## 2023-09-21 PROCEDURE — 93325 DOPPLER ECHO COLOR FLOW MAPG: CPT | Performed by: INTERNAL MEDICINE

## 2023-09-21 PROCEDURE — 93321 DOPPLER ECHO F-UP/LMTD STD: CPT | Performed by: INTERNAL MEDICINE

## 2023-09-21 PROCEDURE — 93325 DOPPLER ECHO COLOR FLOW MAPG: CPT

## 2023-09-21 PROCEDURE — 93308 TTE F-UP OR LMTD: CPT | Performed by: INTERNAL MEDICINE

## 2023-09-23 ENCOUNTER — HOSPITAL ENCOUNTER (OUTPATIENT)
Dept: RADIOLOGY | Facility: HOSPITAL | Age: 63
Discharge: HOME | End: 2023-09-23
Attending: SURGERY
Payer: MEDICARE

## 2023-09-23 DIAGNOSIS — L97.421 ULCER OF LEFT HEEL, LIMITED TO BREAKDOWN OF SKIN (HCC): ICD-10-CM

## 2023-09-23 PROCEDURE — 71046 X-RAY EXAM CHEST 2 VIEWS: CPT

## 2023-09-29 ENCOUNTER — ANESTHESIA (OUTPATIENT)
Dept: PERIOP | Facility: HOSPITAL | Age: 63
End: 2023-09-29
Payer: MEDICARE

## 2023-09-29 ENCOUNTER — HOSPITAL ENCOUNTER (OUTPATIENT)
Facility: HOSPITAL | Age: 63
Setting detail: OUTPATIENT SURGERY
Discharge: HOME | End: 2023-09-29
Attending: SURGERY | Admitting: SURGERY
Payer: MEDICARE

## 2023-09-29 ENCOUNTER — APPOINTMENT (OUTPATIENT)
Dept: RADIOLOGY | Facility: HOSPITAL | Age: 63
End: 2023-09-29
Payer: MEDICARE

## 2023-09-29 VITALS
HEIGHT: 69 IN | HEART RATE: 66 BPM | OXYGEN SATURATION: 95 % | TEMPERATURE: 97.3 F | SYSTOLIC BLOOD PRESSURE: 97 MMHG | RESPIRATION RATE: 15 BRPM | WEIGHT: 210 LBS | BODY MASS INDEX: 31.1 KG/M2 | DIASTOLIC BLOOD PRESSURE: 48 MMHG

## 2023-09-29 LAB
BASE EXCESS BLDA CALC-SCNC: 3 MMOL/L (ref -2–3)
CA-I BLD-SCNC: 1.19 MMOL/L (ref 1.12–1.32)
GLUCOSE SERPL-MCNC: 102 MG/DL (ref 65–140)
GLUCOSE SERPL-MCNC: 105 MG/DL (ref 65–140)
GLUCOSE SERPL-MCNC: 92 MG/DL (ref 65–140)
HCO3 BLDA-SCNC: 27.7 MMOL/L (ref 24–30)
HCT VFR BLD CALC: 32 % (ref 36.5–49.3)
HGB BLDA-MCNC: 10.9 G/DL (ref 12–17)
PCO2 BLD: 29 MMOL/L (ref 21–32)
PCO2 BLD: 42.7 MM HG (ref 42–50)
PH BLD: 7.42 [PH] (ref 7.3–7.4)
PO2 BLD: 34 MM HG (ref 35–45)
POTASSIUM BLD-SCNC: 4.8 MMOL/L (ref 3.5–5.3)
SAO2 % BLD FROM PO2: 66 % (ref 60–85)
SODIUM BLD-SCNC: 137 MMOL/L (ref 136–145)
SPECIMEN SOURCE: ABNORMAL

## 2023-09-29 PROCEDURE — C1894 INTRO/SHEATH, NON-LASER: HCPCS | Performed by: SURGERY

## 2023-09-29 PROCEDURE — C1887 CATHETER, GUIDING: HCPCS | Performed by: SURGERY

## 2023-09-29 PROCEDURE — C1769 GUIDE WIRE: HCPCS | Performed by: SURGERY

## 2023-09-29 PROCEDURE — 99024 POSTOP FOLLOW-UP VISIT: CPT | Performed by: SURGERY

## 2023-09-29 PROCEDURE — 37232 PR REVSC OPN/PRQ TIB/PERO W/ANGIOPLASTY UNI EA VSL: CPT | Performed by: SURGERY

## 2023-09-29 PROCEDURE — 75710 ARTERY X-RAYS ARM/LEG: CPT | Performed by: SURGERY

## 2023-09-29 PROCEDURE — 82330 ASSAY OF CALCIUM: CPT

## 2023-09-29 PROCEDURE — C1725 CATH, TRANSLUMIN NON-LASER: HCPCS | Performed by: SURGERY

## 2023-09-29 PROCEDURE — 82947 ASSAY GLUCOSE BLOOD QUANT: CPT

## 2023-09-29 PROCEDURE — 84132 ASSAY OF SERUM POTASSIUM: CPT

## 2023-09-29 PROCEDURE — 85014 HEMATOCRIT: CPT

## 2023-09-29 PROCEDURE — 84295 ASSAY OF SERUM SODIUM: CPT

## 2023-09-29 PROCEDURE — 76937 US GUIDE VASCULAR ACCESS: CPT

## 2023-09-29 PROCEDURE — 82948 REAGENT STRIP/BLOOD GLUCOSE: CPT

## 2023-09-29 PROCEDURE — 75625 CONTRAST EXAM ABDOMINL AORTA: CPT | Performed by: SURGERY

## 2023-09-29 PROCEDURE — C1760 CLOSURE DEV, VASC: HCPCS | Performed by: SURGERY

## 2023-09-29 PROCEDURE — NC001 PR NO CHARGE: Performed by: SURGERY

## 2023-09-29 PROCEDURE — 82803 BLOOD GASES ANY COMBINATION: CPT

## 2023-09-29 PROCEDURE — 37228 PR REVSC OPN/PRQ TIB/PERO W/ANGIOPLASTY UNI: CPT | Performed by: SURGERY

## 2023-09-29 DEVICE — DEVICE CLOSURE MYNX CONTROL 5F: Type: IMPLANTABLE DEVICE | Site: GROIN | Status: FUNCTIONAL

## 2023-09-29 RX ORDER — DEXAMETHASONE SODIUM PHOSPHATE 10 MG/ML
INJECTION, SOLUTION INTRAMUSCULAR; INTRAVENOUS AS NEEDED
Status: DISCONTINUED | OUTPATIENT
Start: 2023-09-29 | End: 2023-09-29

## 2023-09-29 RX ORDER — FENTANYL CITRATE/PF 50 MCG/ML
25 SYRINGE (ML) INJECTION
Status: DISCONTINUED | OUTPATIENT
Start: 2023-09-29 | End: 2023-09-29 | Stop reason: HOSPADM

## 2023-09-29 RX ORDER — CHLORHEXIDINE GLUCONATE ORAL RINSE 1.2 MG/ML
15 SOLUTION DENTAL ONCE
Status: COMPLETED | OUTPATIENT
Start: 2023-09-29 | End: 2023-09-29

## 2023-09-29 RX ORDER — ONDANSETRON 2 MG/ML
4 INJECTION INTRAMUSCULAR; INTRAVENOUS ONCE AS NEEDED
Status: DISCONTINUED | OUTPATIENT
Start: 2023-09-29 | End: 2023-09-29 | Stop reason: HOSPADM

## 2023-09-29 RX ORDER — LIDOCAINE HYDROCHLORIDE 10 MG/ML
INJECTION, SOLUTION EPIDURAL; INFILTRATION; INTRACAUDAL; PERINEURAL AS NEEDED
Status: DISCONTINUED | OUTPATIENT
Start: 2023-09-29 | End: 2023-09-29 | Stop reason: HOSPADM

## 2023-09-29 RX ORDER — FENTANYL CITRATE 50 UG/ML
INJECTION, SOLUTION INTRAMUSCULAR; INTRAVENOUS AS NEEDED
Status: DISCONTINUED | OUTPATIENT
Start: 2023-09-29 | End: 2023-09-29

## 2023-09-29 RX ORDER — NITROGLYCERIN 5 MG/ML
INJECTION, SOLUTION INTRAVENOUS AS NEEDED
Status: DISCONTINUED | OUTPATIENT
Start: 2023-09-29 | End: 2023-09-29 | Stop reason: HOSPADM

## 2023-09-29 RX ORDER — HEPARIN SODIUM 1000 [USP'U]/ML
INJECTION, SOLUTION INTRAVENOUS; SUBCUTANEOUS AS NEEDED
Status: DISCONTINUED | OUTPATIENT
Start: 2023-09-29 | End: 2023-09-29

## 2023-09-29 RX ORDER — ACETAMINOPHEN 325 MG/1
650 TABLET ORAL EVERY 4 HOURS PRN
Status: DISCONTINUED | OUTPATIENT
Start: 2023-09-29 | End: 2023-09-29 | Stop reason: HOSPADM

## 2023-09-29 RX ORDER — MIDAZOLAM HYDROCHLORIDE 2 MG/2ML
INJECTION, SOLUTION INTRAMUSCULAR; INTRAVENOUS AS NEEDED
Status: DISCONTINUED | OUTPATIENT
Start: 2023-09-29 | End: 2023-09-29

## 2023-09-29 RX ORDER — IODIXANOL 320 MG/ML
INJECTION, SOLUTION INTRAVASCULAR AS NEEDED
Status: DISCONTINUED | OUTPATIENT
Start: 2023-09-29 | End: 2023-09-29 | Stop reason: HOSPADM

## 2023-09-29 RX ORDER — SODIUM CHLORIDE 9 MG/ML
INJECTION, SOLUTION INTRAVENOUS CONTINUOUS PRN
Status: DISCONTINUED | OUTPATIENT
Start: 2023-09-29 | End: 2023-09-29

## 2023-09-29 RX ORDER — PROPOFOL 10 MG/ML
INJECTION, EMULSION INTRAVENOUS CONTINUOUS PRN
Status: DISCONTINUED | OUTPATIENT
Start: 2023-09-29 | End: 2023-09-29

## 2023-09-29 RX ORDER — ONDANSETRON 2 MG/ML
INJECTION INTRAMUSCULAR; INTRAVENOUS AS NEEDED
Status: DISCONTINUED | OUTPATIENT
Start: 2023-09-29 | End: 2023-09-29

## 2023-09-29 RX ADMIN — FENTANYL CITRATE 25 MCG: 50 INJECTION INTRAMUSCULAR; INTRAVENOUS at 08:45

## 2023-09-29 RX ADMIN — HEPARIN SODIUM 5000 UNITS: 1000 INJECTION INTRAVENOUS; SUBCUTANEOUS at 08:48

## 2023-09-29 RX ADMIN — MIDAZOLAM 1 MG: 1 INJECTION INTRAMUSCULAR; INTRAVENOUS at 08:56

## 2023-09-29 RX ADMIN — SODIUM CHLORIDE: 0.9 INJECTION, SOLUTION INTRAVENOUS at 07:51

## 2023-09-29 RX ADMIN — HEPARIN SODIUM 1000 UNITS: 1000 INJECTION INTRAVENOUS; SUBCUTANEOUS at 09:25

## 2023-09-29 RX ADMIN — PROPOFOL 50 MCG/KG/MIN: 10 INJECTION, EMULSION INTRAVENOUS at 07:57

## 2023-09-29 RX ADMIN — PHENYLEPHRINE HYDROCHLORIDE 10 MCG/MIN: 10 INJECTION INTRAVENOUS at 08:04

## 2023-09-29 RX ADMIN — MIDAZOLAM 1 MG: 1 INJECTION INTRAMUSCULAR; INTRAVENOUS at 07:48

## 2023-09-29 RX ADMIN — FENTANYL CITRATE 25 MCG: 50 INJECTION INTRAMUSCULAR; INTRAVENOUS at 08:07

## 2023-09-29 RX ADMIN — ONDANSETRON 4 MG: 2 INJECTION INTRAMUSCULAR; INTRAVENOUS at 08:57

## 2023-09-29 RX ADMIN — CHLORHEXIDINE GLUCONATE 15 ML: 1.2 SOLUTION ORAL at 06:33

## 2023-09-29 RX ADMIN — DEXAMETHASONE SODIUM PHOSPHATE 10 MG: 10 INJECTION, SOLUTION INTRAMUSCULAR; INTRAVENOUS at 08:57

## 2023-09-29 RX ADMIN — FENTANYL CITRATE 25 MCG: 50 INJECTION INTRAMUSCULAR; INTRAVENOUS at 08:40

## 2023-09-29 RX ADMIN — SODIUM CHLORIDE 0.3 MCG/KG/HR: 9 INJECTION, SOLUTION INTRAVENOUS at 08:16

## 2023-09-29 RX ADMIN — FENTANYL CITRATE 25 MCG: 50 INJECTION INTRAMUSCULAR; INTRAVENOUS at 07:58

## 2023-09-29 NOTE — ANESTHESIA POSTPROCEDURE EVALUATION
Post-Op Assessment Note    CV Status:  Stable  Pain Score: 0    Pain management: adequate     Mental Status:  Alert and awake   Hydration Status:  Euvolemic   PONV Controlled:  Controlled   Airway Patency:  Patent      Post Op Vitals Reviewed: Yes      Staff: Anesthesiologist, CRNA         No notable events documented.     BP   103/50   Temp  97.6   Pulse  63   Resp   18   SpO2   99

## 2023-09-29 NOTE — H&P
Assessment/Plan:     Ulcer of left heel, limited to breakdown of skin (HCC)  Left heel ulcer is not improving for about 3 months. Risk factors of diabetes, ESRD on HD   Arterial duplex shows tibioperoneal disease. Its in the posterior tibial distribution, we should proceed with arteriogram and possible intervention to optimize flow.        Operative Scheduling Information:     [facility]     Physician:  Gadiel Alonso     Surgery: Left leg angiogram with intervention     Urgency:  Standard     Level:  Level 3: Outpatients to be scheduled for elective surgery than can be delayed up to 4 weeks without reasonable expectation of detriment to patient     Case Length:  Normal     Post-op Bed:  Outpatient     OR Table:  Discovery     Equipment Needs:  None     Medication Instructions:  Aspirin:   Continue (do not hold)  Other: effient  Continue (do not hold)     Hydration:  No     Contrast Allergy:  no         Diagnoses and all orders for this visit:     Ulcer of left heel, limited to breakdown of skin ([address])  -     Case request operating room: Left leg angiogram with intervention; Standing  -     CBC and Platelet; Future  -     EKG 12 lead; Future  -     XR chest pa & lateral; Future  -     Ambulatory referral to Cardiology; Future  -     Basic metabolic panel; Future  -     Case request operating room: Left leg angiogram with intervention     Other orders  -     Diet NPO; Sips with meds; Standing  -     Void on call to OR; Standing  -     Insert peripheral IV; Standing  -     Nursing Communication CHG bath, have staff wash entire body (neck down) per pre op bathing protocol. Routine, evening prior to, and day of surgery.; Standing  -     Nursing Communication Swab both nares with Povidone-Iodine solution, EXCLUDE if patient has shellfish/Iodine allergy, and replace with nasal alcohol swabstick.  Routine, day of surgery, on call to OR.; Standing  -     chlorhexidine (PERIDEX) 0.12 % oral rinse 15 mL  - Shower/scrub; Standing            Subjective:       Patient ID: Ritesh Trinh is a 61 y.o. male.     Patient presents for 3week f/u evaluation of wound in L heel. Pt reports pain in L foot. L heel wound is closed and dry. Malik Azbolivar He is not a smoker.      HPI  1 month follow up. Left heel ulcer persists. He has pain in the left heel wound as well. He is seeing Dr. Kam Dobbs from podiatry. History of end-stage renal disease on dialysis for past 3 years via left upper extremity fistula. Patient takes daily aspirin and atorvastatin.        The following portions of the patient's history were reviewed and updated as appropriate: allergies, current medications, past family history, past medical history, past social history, past surgical history and problem list.     Review of Systems   Skin: Positive for wound (L heel wound). All other systems reviewed and are negative. I have reviewed the ROS as entered and made changes as necessary.        Objective:        /63   Pulse 76   Temp (!) 96.8 °F (36 °C) (Temporal)   Resp 17   Ht 5' 9" (1.753 m)   Wt 95.3 kg (210 lb)   SpO2 98%   BMI 31.01 kg/m²               Physical Exam  Vitals and nursing note reviewed. Constitutional:       Appearance: Normal appearance. HENT:      Head: Normocephalic and atraumatic. Cardiovascular:      Rate and Rhythm: Normal rate and regular rhythm. Pulses:           Dorsalis pedis pulses are 1+ on the left side. Heart sounds: Normal heart sounds. Comments: There is no left posterior tibial signal or pulse  Pulmonary:      Effort: Pulmonary effort is normal.      Breath sounds: Normal breath sounds. Abdominal:      General: There is distension. Palpations: Abdomen is soft. Musculoskeletal:      Right lower leg: No edema. Left lower leg: No edema. Skin:     Capillary Refill: Capillary refill takes less than 2 seconds. Comments: Irregular heel ulceration, superficial necrosis.   Surrounding tenderness. Neurological:      Mental Status: He is alert.    Psychiatric:         Mood and Affect: Mood normal.         Behavior: Behavior normal.

## 2023-09-29 NOTE — ANESTHESIA PREPROCEDURE EVALUATION
Procedure:  Left leg angiogram with intervention (Left: Abdomen)    Relevant Problems   CARDIO   (+) CAD, multiple vessel   (+) Chest pain   (+) Mixed hyperlipidemia   (+) Moderate AS (aortic stenosis)   (+) Primary hypertension   (+) Type 1 non-ST elevation myocardial infarction (NSTEMI) s/p ADE to in-stent restenosis of mid LAD      ENDO   (+) Type 2 diabetes mellitus with chronic kidney disease on chronic dialysis, without long-term current use of insulin (HCC)      GI/HEPATIC   (+) GERD (gastroesophageal reflux disease)      /RENAL   (+) ESRD on dialysis (HCC)      HEMATOLOGY   (+) Anemia      NEURO/PSYCH   (+) Anxiety associated with depression   (+) Diabetic polyneuropathy associated with type 2 diabetes mellitus (HCC)      PULMONARY   (+) SOB (shortness of breath)      Echo 9/21/23:   •  Left Ventricle: Left ventricular cavity size is dilated. Wall thickness is mildly increased. There is eccentric hypertrophy. The left ventricular ejection fraction is 35%. Systolic function is moderately reduced. •  The following segments are akinetic: basal inferoseptal, basal inferior, mid inferior, mid inferolateral, apical anterior, apical lateral and apex. •  The following segments are hypokinetic: basal anterior, basal inferolateral, mid anterior, mid inferoseptal and apical inferior. •  Right Ventricle: Right ventricular cavity size is mildly dilated. Systolic function is mildly to moderately reduced. •  Left Atrium: The atrium is moderately dilated. •  Right Atrium: The atrium is mildly dilated. •  Aortic Valve: The aortic valve is trileaflet. The leaflets are moderately thickened. The leaflets are moderately calcified. There is moderately reduced mobility. There is trace regurgitation. There is moderate stenosis. The aortic valve mean gradient is 12 mmHg. The dimensionless velocity index is 0.30. The aortic valve area is 1.36 cm2. •  Mitral Valve: There is moderate annular calcification.  There is mild to moderate regurgitation. •  Tricuspid Valve: There is mild regurgitation. The right ventricular systolic pressure is moderately elevated. •  Pulmonic Valve: There is mild to moderate regurgitation. •  Aorta: The aortic root is normal in size. The ascending aorta is mildly dilated. •  Pulmonary Artery: The pulmonary artery systolic pressure is moderately increased. •  Prior TTE study available for comparison. Prior study date: 6/20/2023. No significant changes noted compared to the prior study. Cardiac cath 2/1/23:   Multivessel CAD with marked progression since the prior angiographic study in May 2022. Stents placed in the mid LAD exhibited significant discrete and-stent restenosis. The first OM branch, stented in 5/22, has become occluded. The distal RCA beyond the PDA has akso become occluded. LAD in--stent restenosis was interrogated by IVUS imaging and successfully treated with placement of a 4.0x13mm Orsiro ADE. An attempt was made to wire the occluded OM branch, but the wire could not be advanced beyond the stent in mid vessel. Plan: DAPT, statin, compliance with medical and hemodialysis therapy, increased activity, weight loss. Discussed in detail with the patient's family.     Lab Results   Component Value Date    WBC 4.92 09/16/2023    HGB 11.7 (L) 09/16/2023    HCT 37.0 09/16/2023    MCV 99 (H) 09/16/2023     (L) 09/16/2023     Lab Results   Component Value Date    SODIUM 141 09/16/2023    K 4.9 09/16/2023    CL 98 09/16/2023    CO2 35 (H) 09/16/2023    BUN 32 (H) 09/16/2023    CREATININE 5.44 (H) 09/16/2023    GLUC 84 07/16/2023    CALCIUM 9.5 09/16/2023     Lab Results   Component Value Date    INR 1.03 01/29/2023    INR 1.01 10/20/2022    INR 1.01 07/25/2022    PROTIME 13.7 01/29/2023    PROTIME 13.5 10/20/2022    PROTIME 13.5 07/25/2022     Lab Results   Component Value Date    HGBA1C 4.9 04/18/2023          Physical Exam    Airway    Mallampati score: II    Neck ROM: full Dental   Comment: Edentulous,     Cardiovascular      Pulmonary      Other Findings        Anesthesia Plan  ASA Score- 4     Anesthesia Type- IV sedation with anesthesia with ASA Monitors. Additional Monitors:   Airway Plan:           Plan Factors-Exercise tolerance (METS): >4 METS. Chart reviewed. Existing labs reviewed. Patient summary reviewed. Patient is not a current smoker. Obstructive sleep apnea risk education given perioperatively. Induction- intravenous. Postoperative Plan-     Informed Consent- Anesthetic plan and risks discussed with patient and spouse. I personally reviewed this patient with the CRNA. Discussed and agreed on the Anesthesia Plan with the CRNA. Mercedez Hayes

## 2023-09-29 NOTE — DISCHARGE INSTR - AVS FIRST PAGE
DISCHARGE INSTRUCTIONS  ARTERIOGRAM/ANGIOPLASTY/STENT    ACTIVITY: On the evening following the procedure, you should be mostly resting. Someone should remain with you during the evening and overnight following the procedure. On the day after your procedure, limit your activity to walking. Avoid heavy lifting (no more than 15 lbs) for the first three days. Walking up steps and normal activities may be resumed as you feel ready. You should not drive a car for at least two days following discharge from the hospital. You may ride in a car. If you have any questions regarding a particular activity, please discuss with your doctor or nurse before you are discharged. DIET:  Resume your normal diet. Drink more water than usual for the next 24 hours. PROCEDURE SITE: You may have a procedure site in your groin, arm, or foot. You may have surgical glue at your procedure site. The glue is used to cover the procedure site, assist in closure, and prevent contamination. This adhesive will darken and peel away on its own within one to two weeks. Do not pick at it. You should shower daily. Wash incision daily with soap and water, but do not rub or scrub the incision; rinse thoroughly and pat dry. Do not bathe in a tub or swim for the first 2 week following your procedure or if you have any open wounds. It is normal to have some bruising, swelling or discoloration around the procedure site. IF increasing redness, pain, or a bulge develops, call our office immediately. If present, you may remove the band-aid or “steri-strips” over your procedure site after two days. If you notice any active bleeding at the site, apply pressure to the site and call our office ([phone]) or [phone]. FOLLOW UP STUDIES:  Your doctor will discuss whether further treatments or follow-up studies are necessary at your first post procedure visit.     FOLLOW UP APPOINTMENTS:  Making and keeping follow up appointments and ultrasound tests are important to your recovery. If you have difficulty making it to or keeping your follow up appointments, call the office. If you have increased pain, fever >101.5, increased drainage, redness or a bad smell at your surgery site, new coldness/numbness of your arm or leg, please call us immediately and GO directly to the ER. PLEASE CALL THE OFFICE IF YOU HAVE ANY QUESTIONS  [phone]  [phone]  [address] [facility] [address], [facility], [address] [facility]  [address], [address]  [address], [facility], [address]  [address] [facility], [address] [facility], [place], [address]  [address] [facility],Sixth Floor, 1st Floor, [place], [address]  [address], [address], [address], [place], [address] [facility]  [address], [address] [place] , [facility], [address] [facility]  [address] [facility], [address] [facility], [address] [place] [address] [address] [place] [address] [facility] [address] [facility]y [facility]  [address]

## 2023-09-29 NOTE — OP NOTE
OPERATIVE REPORT  PATIENT NAME: Gretta Fletcher    :  1960  MRN: [id number]  Pt Location: [facility] OR ROOM 02    SURGERY DATE: 2023    Surgeon(s) and Role:     * Wicho Russell MD - Primary     * Gabriel Galeano DO - Fellow    Preop Diagnosis:  Ulcer of left heel, limited to breakdown of skin ([address]) [L97.421]    Post-Op Diagnosis Codes:     * Ulcer of left heel, limited to breakdown of skin ([address]) [L97.421]    Procedure(s):  Left - Left leg angiogram with intervention    Specimen(s):  * No specimens in log *    Estimated Blood Loss:   Minimal    Drains:  * No LDAs found *    Anesthesia Type:   Choice    Operative Indications:  Ulcer of left heel, limited to breakdown of skin [facility]) [L97.421]    62 y/o male with PMH ESRD, CVA, DM, HTN, HLD, CAD c PCI, RLE 3rd Toe Amp presenting with LLE CLTI, non-healing heel ulcer. Discussed risk and benefits of intervention. Complications:   None    Procedure and Technique:    Pre-Op Dx : left lower extremity arterial insufficiency   Post-Op Dx: Same  Procedure:   1. Ultrasound-guided right common Femoral Artery Access  2. Aortogram with pelvic runoff  3. left LE Runoff  4. Left third order arterial branch selective catheterization (post tibial)  5. Left Post Tib 2 x 60 Yehuda PTA  6. Left AntTib 2 x 120 Nanocross PTA  7. Closure of right femoral artery access site with Mynx closure device  8. Supervision and radiologic interpretation of all radiographic imaging    Surgeon: Cristiana Levy MD  Assistant (s): Lisbet Olmedo DO  Anesthesia : Moderate conscious sedation  Findings: see below  Comp: None  EBL: Minimal    Indications:    RADIOGRAPHIC SUPERVISION AND INTERPRETATION OF TEST:    1. Abdominal aortogram findings -   Abdominal aorta - Patent without significant stenosis or aneurysm     2.  Pelvic runoff findings -   Left common iliac artery- Patent without significant stenosis   Left internal iliac artery- Patent without significant stenosis   Left external iliac artery- Patent without significant stenosis     Right common iliac artery- Patent without significant stenosis   Right internal iliac artery- Patent without significant stenosis   Right external iliac artery- Patent without significant stenosis     2. Left lower extremity angiogram findings-    common femoral artery- Patent without significant stenosis    Profunda femorals artery- Patent without significant stenosis   Superficial femoral artery- Patent without significant stenosis   Popliteal artery- Patent without significant stenosis   Anterior tibial artery- Patent with with severe, focal stenosis in proximal portion. Patent with runoff to the DP artery with intact tarsal arcade and pedal arch  Tibioperoneal trunk- Patent without significant stenosis   Posterior tibial artery- Patent without significant stenosis proximally. Distally with occlusion at level of medial malleolus and reconstitution in plantar artery   Peroneal artery- Patent without significant stenosis with diminutive flow to foot  Plantar arch- Patent without significant stenosis     3. Right lower extremity angiogram findings-    common femoral artery- Patent without significant stenosis   Profunda femorals artery- Patent without significant stenosis   Superficial femoral artery- Patent without significant stenosis         Contrast Type/Amount -  96 CC VISIPAQUE 320    Fluoro Time (Mandatory) -  35.5 MIN    Indication: H& P reviewed and imaging reviewed per EMR. The patient was brought to the angio suite. After the placement of monitoring devices, a timeout was performed and conscious sedation was administered. Both groins were prepped and draped in normal sterile fashion. Local anesthesia was infiltrated in the skin and subcutaneous tissues of the right groin and the right femoral artery was cannulated with a 21G micropuncture needle under ultrasound guidance. Wire was advanced under imaging.  After obtaining pulsatile flow, a micro guidewire was inserted through the needle and the access site was enlarged with a #11 scalpel. A micropuncture sheath was placed and angiogram performed to confirm adequate access site above the femoral bifurcation and below the external iliac artery. Hard copy image was stored in PACS. A 5 [place] sheath was advanced over the wire and the wire removed. A MaXware wire was advanced into the abdominal aorta and an aortogram was performed utilizing the Omni Flush Catheter. Run-off films were obtained by selecting the iliac system using the Omni flush catheter. The decision was made to intervene. A 5 x 90 sheath was exchanged over a 0.35 advantage glidewire into the left popliteal artery. The patient was fully heparinized. The posterior tibial lesion was crossed with a Command 0.18 wire. A Yehuda 2 x 60 balloon angioplasty was performed. Unfortunately unable to track the balloon across the area of occlusion. An 0.14 Adv Glidewire was exchanged through the balloon. A CXI microcatheter was unable to cross the occluded PT lesion. A 2 x 120 Nanocross was similiarly unable to cross the PT lesion. Next, the Anterior tibial artery was selected. A 2 x 120 Nanocross balloon angioplasty was performed of the Ant Tib stenosis. Completion angiogram showed excellent result in the AT with patent in-line flow to the foot via the DP, patent tarsal arcade, and patent Plantar Artery. The Postibial artery has a narrow channel with multiple collaterals filling. There was  No flow limiting dissections or residual stenosis. Next a short sheath was exchanged / placed over a wire and secured after flushing an aspirating. A Mynx closure device was utilized. Patient tolerated procedure well; is going to PACU. At the end of the case, patient's bilateral feet were well perfused and had good capillary refill. Patient has left palpable AT, and biphasic PT and a right biphasic DP.      Dr. Rachel Pierre was present the entire case.     Patient Disposition:  PACU         SIGNATURE: Mati Vargas DO  DATE: September 29, 2023  TIME: 10:21 AM      Vascular Quality Initiative - Peripheral Vascular Intervention     Urgency: Elective    Functional Status:  Restricted in physically strenuous activity but ambulatory and able to carry out work of a light or sedentary nature. Ambulation: Amb = independently ambulatory    Leg Symptoms    Right: Asymptomatic:  documented peripheral arterial disease without symptoms of claudication or ischemic pain      Treatment of Native Artery to Maintain Bypass Patency?:  No  Left: Ulcer/necrosis (gangrene): de alla tissue loss due to peripheral arterial disease, not due to non-healing prior amputation       Tissue Loss Severity: Grade 1, Shallow = small shallow ulcer(s) on distal leg or foot, any exposed bone is only limited to distal phalanx (ie, minor tissue loss: limb salvage possible with simple digital amputation [1 or 2 digits], or skin coverage). Infection: Grade 0, None = No symptoms or signs of infection. COVID Information  COVID Symptoms Pre-Procedure: Asymptomatic    Treatment Delayed by Pandemic: None    Access   Number of Sites: 1     Access Site 1:     Side 1: Right    Site 1: Femoral Retrograde    Access Guidance 1:U/S    Largest Sheath Size 1: 5 Fr.     Closure Device 1: MynxGrip      Number of Closure Devices: 1     Closure Device Outcome: Closure device successful         Procedure  Fluoro Time: 35.5 minutes  Contrast Volume: Visipaque 96 ml  DAP: 80.69 Gy.cm2  CO2: no  Anticoagulant: Heparin  Protamine: No  If Creatinine is > 1.2 or missing, SHABANA Prophylaxis none     Treatment Details  Indication: Occlusive Disease,    Completion Assessment  Artery 1 treated: Post Tibial  Left                     Was this Site previously treated?: No          TASC Grade: A          Total Treated Length: 2 cm          Total Occluded Length: 2 cm          Calcification: Severe (calcification on both sides of artery > half length of lesion)          Number of Treatment types (Devices):   1           Device 1          Treatment Type: Plain Balloon            Concomitant: None          Technical result: Technical failure, unable to cross lesion  Medical    Artery 2 treated: Ant Tibial   Left                    Was this Site previously treated?: No          TASC Grade: A          Total Treated Length: 2 cm           Total Occluded Length: 0 cm          Calcification: Mild (calcification on one side of artery > half length of lesion)          Number of Treatment types (Devices):   1           Device 1          Treatment Type: Plain Balloon            Concomitant: None          Technical result: Successful (stenosis <=30%)       None     Post Procedure  Patient currently taking: Statin, Yes      Antiplatelet Medication, Yes    Procedure Complications: No

## 2023-10-03 ENCOUNTER — TELEPHONE (OUTPATIENT)
Dept: VASCULAR SURGERY | Facility: CLINIC | Age: 63
End: 2023-10-03

## 2023-10-03 ENCOUNTER — TELEPHONE (OUTPATIENT)
Age: 63
End: 2023-10-03

## 2023-10-03 NOTE — TELEPHONE ENCOUNTER
Called and spoke w/ son- they just got home from another doctor's appointment. They are going to try to send the photo now.

## 2023-10-03 NOTE — TELEPHONE ENCOUNTER
Spoke w/ pt's son. He is going to try to send a photo through My Chart. He has not tried tylenol. He denies any swelling.

## 2023-10-03 NOTE — TELEPHONE ENCOUNTER
Reviewed. Is he able to send a picture of the foot to assess? Has he tried tylenol for pain relief? Is he having any leg swelling post procedure?

## 2023-10-03 NOTE — TELEPHONE ENCOUNTER
Caller: Gemma Calderon    Doctor/Office: Dr. Milena Ly    #: [phone]    Escalation: Care/Said the sore on bottom of foot is red and very painful/open. Had angiagram and asking for call back as to be seen sooner-appt is 10/10/23. Please call back/advise.  Thanks

## 2023-10-03 NOTE — TELEPHONE ENCOUNTER
Reviewed patient entered photos. I do not see any acute concerns at this time. Patient is reported to be motor and sensation intact. Patients redness may be related to reperfusion post intervention and should resolve with time. I encourage him to try Tylenol for pain relief as needed and will ask that podiatry assess foot tomorrow and reach out with any concerns since Dr. Omayra Rossi does not have any sooner availability. If he develops and n ew or worsening symptoms, he should notify our office.

## 2023-10-03 NOTE — TELEPHONE ENCOUNTER
Pt s/p LLE royal w/ intervention 9/29. Pt is scheduled for follow up OV w/ Dr. Darwin Herrera 10/11. Pt's son called the office today asking for pt to be seen today or tomorrow by Dr. Darwin Herrera because his L foot looks red. Pt's son states that pt had pain in the L foot last night while laying in bed. Denies any pain w/ leg in dependent position. Denies claudication or numbness/tingling. Foot is warm. Dr. Darwin Herrera does not currently have any sooner openings. Informed him I would send a message to our triage provider and we will call him back.

## 2023-10-03 NOTE — TELEPHONE ENCOUNTER
Called PT  Left message   Was able to fit him at [facility] 10/4/23 10:45am  Waiting on call back to confirm

## 2023-10-10 ENCOUNTER — OFFICE VISIT (OUTPATIENT)
Dept: PODIATRY | Facility: CLINIC | Age: 63
End: 2023-10-10
Payer: MEDICARE

## 2023-10-10 VITALS
SYSTOLIC BLOOD PRESSURE: 149 MMHG | DIASTOLIC BLOOD PRESSURE: 86 MMHG | BODY MASS INDEX: 31.1 KG/M2 | HEIGHT: 69 IN | WEIGHT: 210 LBS | HEART RATE: 66 BPM

## 2023-10-10 DIAGNOSIS — E11.42 DIABETIC POLYNEUROPATHY ASSOCIATED WITH TYPE 2 DIABETES MELLITUS (HCC): ICD-10-CM

## 2023-10-10 DIAGNOSIS — L97.429 ULCER OF LEFT HEEL, WITH UNSPECIFIED SEVERITY (HCC): Primary | ICD-10-CM

## 2023-10-10 DIAGNOSIS — I73.9 PERIPHERAL ARTERIAL DISEASE (HCC): ICD-10-CM

## 2023-10-10 PROCEDURE — 99213 OFFICE O/P EST LOW 20 MIN: CPT | Performed by: PODIATRIST

## 2023-10-10 NOTE — PROGRESS NOTES
PATIENT:  Arcelia Denise    1960      ASSESSMENT:     1. Ulcer of left heel, with unspecified severity ([address])        2. Diabetic polyneuropathy associated with type 2 diabetes mellitus ([address])        3. Peripheral arterial disease ([address])            PLAN:  1. Reviewed medical records. Reviewed the IR note. Patient was counseled on the condition and diagnosis. 2. He has successful angioplasty with palpable DP. Wound is stable with eschar. Restart Santyl. Continue Globopedi shoe. Instructed proper off-loading in bed with Prevalon boot. 3. Pt to follow-up with vascular surgery. Possible excisional debridement in the next visit. 4. He will return in 2 weeks. I have spent a total time of 20 minutes on 10/10/23 in caring for this patient including Prognosis, Risks and benefits of tx options, Instructions for management, Patient and family education, Importance of tx compliance and Risk factor reductions. Subjective:      HPI:   The patients presents for evaluation of left heel ulcer. S/P A-gram.  Wound looks stable. No redness. He presents with Globoped shoe today. BS under control. He was not compliant about wearing Prevalon boot in bed. The following portions of the patient's history were reviewed and updated as appropriate: allergies, current medications, past family history, past medical history, past social history, past surgical history and problem list.  All pertinent labs and images were reviewed.     Past Medical History  Past Medical History:   Diagnosis Date   • Cerebrovascular accident (CVA) due to thrombosis of left middle cerebral artery ([address]) 07/29/2018   • Chronic kidney disease    • Coronary artery disease    • Diabetes mellitus ([address])     not on meds   • Dialysis patient [facility])     M-W-F   • Fistula     left upper arm for hemodialyis   • GERD (gastroesophageal reflux disease)    • History of coronary artery stent placement     x3   • Hypercholesteremia    • Hyperlipidemia    • Hypertension    • 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   • Limb alert care status     no BP/IV left arm   • Neuropathy    • Obesity    • Osteomyelitis ([address])     last assessed 11/4/16-per son not currently   • PVC's (premature ventricular contractions)     sees [facility]   • Stroke [facility])     last weeof July 2018 [facility]   • TIA (transient ischemic attack) 10/28/2018   • Wears dentures    • Wears glasses        Past Surgical History  Past Surgical History:   Procedure Laterality Date   • ABDOMINAL SURGERY     • CARDIAC CATHETERIZATION N/A 05/02/2022    Procedure: Cardiac Coronary Angiogram;  Surgeon: Evelina De La Cruz MD;  [facility]; Service: Cardiology   • CARDIAC CATHETERIZATION N/A 05/02/2022    Procedure: Cardiac pci;  Surgeon: Evelina De La Cruz MD;  [facility]; Service: Cardiology   • CARDIAC CATHETERIZATION  02/01/2023    Procedure: Cardiac catheterization;  Surgeon: Evelina De La Cruz MD;  [facility]; Service: Cardiology   • CARDIAC CATHETERIZATION N/A 02/01/2023    Procedure: Cardiac pci;  Surgeon: Evelina De La Cruz MD;  [facility]: [facility]ARD[facility]; Service: Cardiology   • CARDIAC CATHETERIZATION N/A 02/01/2023    Procedure: Cardiac Coronary Angiogram;  Surgeon: Evelina De La Cruz MD;  [facility]; Service: Cardiology   • CARDIAC CATHETERIZATION N/A 02/01/2023    Procedure: Cardiac other-IVUS;  Surgeon: Evelina De La Cruz MD;  [facility]: [facility]ARD[facility]; Service: Cardiology   • CHOLECYSTECTOMY      Percutaneous   • COLONOSCOPY     • CYSTOSCOPY     • IR LOWER EXTREMITY ANGIOGRAM  9/29/2023   • OTHER SURGICAL HISTORY      "stimulator to control bowel movements"   • NV ESOPHAGOGASTRODUODENOSCOPY TRANSORAL DIAGNOSTIC N/A 09/27/2016    Procedure: ESOPHAGOGASTRODUODENOSCOPY (EGD); Surgeon: Maryjo Steve MD;  Location: [facility] GI LAB;   Service: Gastroenterology   • NV LAPAROSCOPY SURG CHOLECYSTECTOMY N/A 02/29/2016 Procedure: LAPAROSCOPIC CHOLECYSTECTOMY ;  Surgeon: Americo Cates DO;  Location: [facility] Main OR;  Service: General   • IA Tommie Quilesar 3RD+ ORD [address] PEL/LXTR [facility] Left 9/29/2023    Procedure: Left leg angiogram with intervention;  Surgeon: Kristine Calero MD;  Location: [facility] MAIN OR;  Service: Vascular   • ROTATOR CUFF REPAIR Right    • SPINAL CORD STIMULATOR IMPLANT      "Medtronic interstim model # 2013- in lower back to control bowel movements-currently turned off-battery is dead"   • TOE AMPUTATION Right 10/28/2016    Procedure: 3RD TOE AMPUTATION ;  Surgeon: Gabriel Fischer DPM;  Location: [facility] Main OR;  Service:         Allergies:  Patient has no known allergies. Medications:  Current Outpatient Medications   Medication Sig Dispense Refill   • aspirin (ECOTRIN LOW STRENGTH) 81 mg EC tablet Take 81 mg by mouth daily Resume on 8/14       • atorvastatin (LIPITOR) 40 mg tablet Take 1 tablet (40 mg total) by mouth daily with dinner 90 tablet 1   • Blood Glucose Monitoring Suppl (True Metrix Meter) w/Device KIT Use to test blood sugars 3 times daily 1 kit 0   • Blood Pressure Monitoring (BLOOD PRESSURE CUFF) MISC Use to check blood pressure before taking blood pressure medication and 1 hour after and follow instructions provided in discharge instructions based on the readings.  1 each 0   • collagenase (SANTYL) ointment Apply topically daily 90 g 0   • D3-50 1.25 MG (30736 UT) capsule TAKE 1 CAPSULE BY MOUTH ONE TIME PER WEEK (Patient taking differently: sunday) 12 capsule 2   • divalproex sodium (DEPAKOTE SPRINKLE) 125 MG capsule TAKE 2 CAPSULES (250 MG TOTAL) BY MOUTH EVERY 12 (TWELVE) HOURS 360 capsule 1   • fluticasone (FLONASE) 50 mcg/act nasal spray 1 spray into each nostril daily 9.9 mL 0   • glucose blood (True Metrix Blood Glucose Test) test strip Use 1 each 3 (three) times a day Use as instructed 300 strip 1   • metoprolol succinate (TOPROL-XL) 50 mg 24 hr tablet Take 1 tablet (50 mg total) by mouth 2 (two) times a day 180 tablet 3   • midodrine (PROAMATINE) 2.5 mg tablet Take 1 tablet (2.5 mg total) by mouth as needed (for SBP<90 on dialysis days) 30 tablet 0   • ONETOUCH DELICA LANCETS 27U MISC by Does not apply route 3 (three) times a day 270 each 1   • prasugrel (EFFIENT) tablet Take 1 tablet (5 mg total) by mouth daily (Patient taking differently: Take 5 mg by mouth daily Takes with pudding) 90 tablet 3   • sacubitril-valsartan (Entresto) 24-26 MG TABS Take 1 tablet by mouth in the morning Bedtime (Patient taking differently: Take 1 tablet by mouth daily at bedtime Bedtime) 30 tablet 11   • Sevelamer Carbonate 2.4 g PACK VIGOROUSLY MIX CONTENTS OF 1 PACKET IN WATER (IT WILL NOT DISSOLVE) AND DRINK 3 TIMES DAILY WITH MEALS       No current facility-administered medications for this visit. Social History:  Social History     Socioeconomic History   • Marital status: /Civil Union     Spouse name: None   • Number of children: None   • Years of education: None   • Highest education level: Not asked   Occupational History   • Occupation: disabled   Tobacco Use   • Smoking status: Never   • Smokeless tobacco: Never   Vaping Use   • Vaping Use: Never used   Substance and Sexual Activity   • Alcohol use: Never   • Drug use: No   • Sexual activity: None     Comment: defer   Other Topics Concern   • None   Social History Narrative    Daily caffeine consumption 2-3 servings a day     Social Determinants of Health     Financial Resource Strain: Unknown (7/13/2023)    Overall Financial Resource Strain (CARDIA)    • Difficulty of Paying Living Expenses: Patient refused   Food Insecurity: No Food Insecurity (2/1/2023)    Hunger Vital Sign    • Worried About Running Out of Food in the Last Year: Never true    • Ran Out of Food in the Last Year: Never true   Transportation Needs: No Transportation Needs (7/13/2023)    PRAPARE - Transportation    • Lack of Transportation (Medical):  No    • Lack of Transportation (Non-Medical): No   Physical Activity: Not on file   Stress: No Stress Concern Present (1/18/2019)    [address]    • Feeling of Stress : Only a little   Social Connections: Unknown (1/18/2019)    Social Connection and Isolation Panel [NHANES]    • Frequency of Communication with Friends and Family: Not on file    • Frequency of Social Gatherings with Friends and Family: Not on file    • Attends Rastafari Services: Not on file    • Active Member of Clubs or Organizations: Not on file    • Attends Club or Organization Meetings: Not on file    • Marital Status:    Intimate Partner Violence: Not At Risk (1/18/2019)    Humiliation, Afraid, Rape, and Kick questionnaire    • Fear of Current or Ex-Partner: No    • Emotionally Abused: No    • Physically Abused: No    • Sexually Abused: No   Housing Stability: [address]  (2/1/2023)    Housing Stability Vital Sign    • Unable to Pay for Housing in the Last Year: No    • Number of Places Lived in the Last Year: 1    • Unstable Housing in the Last Year: No        Review of Systems   Constitutional: Negative for chills and fever. Respiratory: Negative for cough and shortness of breath. Cardiovascular: Negative for chest pain. Gastrointestinal: Negative for constipation, diarrhea, nausea and vomiting. Neurological: Positive for numbness. Objective:      /86   Pulse 66   Ht 5' 9" (1.753 m)   Wt 95.3 kg (210 lb)   BMI 31.01 kg/m²          Physical Exam  Vitals reviewed. Constitutional:       General: He is not in acute distress. Appearance: He is well-developed. He is not toxic-appearing or diaphoretic. Cardiovascular:      Rate and Rhythm: Normal rate and regular rhythm. Pulses:           Dorsalis pedis pulses are 1+ on the right side and 0 on the left side. Posterior tibial pulses are 0 on the right side and 0 on the left side.    Pulmonary: Effort: Pulmonary effort is normal. No respiratory distress. Musculoskeletal:         General: Deformity present. No tenderness or signs of injury. Right foot: No foot drop. Left foot: No foot drop. Comments: Hammertoe deformity noted. 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. Feet:      Right foot:      Skin integrity: Dry skin present. No ulcer, skin breakdown, erythema, warmth or callus. Left foot:      Skin integrity: Dry skin present. No ulcer, skin breakdown, erythema, warmth or callus. Skin:     General: Skin is warm. Capillary Refill: Capillary refill takes less than 2 seconds. Coloration: Skin is not cyanotic or mottled. Findings: No ecchymosis. Nails: There is no clubbing. Comments: Dry eschar left heel. It is 1.0 X 1.0 cm. No cellulitis. Neurological:      General: No focal deficit present. Mental Status: He is alert and oriented to person, place, and time. Cranial Nerves: No cranial nerve deficit. Sensory: Sensory deficit present. Motor: No weakness. Psychiatric:         Mood and Affect: Mood normal.         Behavior: Behavior normal.         Thought Content:  Thought content normal.         Judgment: Judgment normal.

## 2023-10-11 ENCOUNTER — OFFICE VISIT (OUTPATIENT)
Dept: VASCULAR SURGERY | Facility: CLINIC | Age: 63
End: 2023-10-11
Payer: MEDICARE

## 2023-10-11 VITALS
WEIGHT: 209 LBS | BODY MASS INDEX: 30.96 KG/M2 | HEIGHT: 69 IN | HEART RATE: 68 BPM | DIASTOLIC BLOOD PRESSURE: 76 MMHG | SYSTOLIC BLOOD PRESSURE: 128 MMHG

## 2023-10-11 DIAGNOSIS — L97.421 ULCER OF LEFT HEEL, LIMITED TO BREAKDOWN OF SKIN (HCC): Primary | ICD-10-CM

## 2023-10-11 PROCEDURE — 99213 OFFICE O/P EST LOW 20 MIN: CPT | Performed by: SURGERY

## 2023-10-11 NOTE — PROGRESS NOTES
Assessment/Plan:    Ulcer of left heel, limited to breakdown of skin [facility])  Patient underwent left anterior tibial balloon angioplasty and left distal posterior tibial balloon angioplasty. The distalmost portion of the posterior tibial artery was heavily calcified and stiff and could not allow passage of balloon catheter. Thus this segment remains untreated. He does have an improved posterior tibial artery signal on exam today. We will allow some more time for the wound to improve. At this point it is not ready for full debridement. I had a long conversation with the patient, his wife and his son who was present on phone. Due to underlying risk factors of end-stage renal disease and diabetes he is at risk of nonhealing of this wound. He will take longer than average time for this wound to heal.  I will recheck in 4 weeks. If not appropriate progression we may have to consider repeat intervention with possible atherectomy with micro crown or pedal loop angioplasty and retrograde access of the distal posterior tibial artery. Although these have higher risk of failure and worsening including distal embolization. Will discuss with Dr. Daniela Plasencia. Diagnoses and all orders for this visit:    Ulcer of left heel, limited to breakdown of skin (HCC)          Subjective:      Patient ID: Nisha Nunez is a 61 y.o. male. Patient presents to review ARTURO paige w/ intervention done 9/29 for non-healing L heel ulcer. HPI  Feels better in left leg. He is walking with heel offloading shoe. The following portions of the patient's history were reviewed and updated as appropriate: allergies, current medications, past family history, past medical history, past social history, past surgical history, and problem list.    Review of Systems   Constitutional: Negative. HENT: Negative. Eyes: Negative. Respiratory: Negative. Cardiovascular: Negative. Gastrointestinal: Negative. Endocrine: Negative. Genitourinary: Negative. Musculoskeletal: Negative. Skin:  Positive for wound. Allergic/Immunologic: Negative. Neurological: Negative. Hematological: Negative. Psychiatric/Behavioral: Negative. I have reviewed the ROS as entered and made changes as necessary. Objective:      /76 (BP Location: Right arm, Patient Position: Sitting, Cuff Size: Standard)   Pulse 68   Ht 5' 9" (1.753 m)   Wt 94.8 kg (209 lb)   BMI 30.86 kg/m²          Physical Exam  Vitals and nursing note reviewed. Constitutional:       Appearance: Normal appearance. HENT:      Head: Normocephalic and atraumatic. Cardiovascular:      Rate and Rhythm: Normal rate and regular rhythm. Comments: Signals over left posterior tibial artery above the knee region. Musculoskeletal:      Right lower leg: No edema. Left lower leg: No edema. Skin:     General: Skin is warm. Capillary Refill: Capillary refill takes less than 2 seconds. Neurological:      General: No focal deficit present. Mental Status: He is alert.    Psychiatric:         Mood and Affect: Mood normal.         Behavior: Behavior normal.

## 2023-10-11 NOTE — ASSESSMENT & PLAN NOTE
Patient underwent left anterior tibial balloon angioplasty and left distal posterior tibial balloon angioplasty. The distalmost portion of the posterior tibial artery was heavily calcified and stiff and could not allow passage of balloon catheter. Thus this segment remains untreated. He does have an improved posterior tibial artery signal on exam today. We will allow some more time for the wound to improve. At this point it is not ready for full debridement. I had a long conversation with the patient, his wife and his son who was present on phone. Due to underlying risk factors of end-stage renal disease and diabetes he is at risk of nonhealing of this wound. He will take longer than average time for this wound to heal.  I will recheck in 4 weeks. If not appropriate progression we may have to consider repeat intervention with possible atherectomy with micro crown or pedal loop angioplasty and retrograde access of the distal posterior tibial artery. Although these have higher risk of failure and worsening including distal embolization. Will discuss with Dr. Louann Apple.

## 2023-10-16 ENCOUNTER — TELEPHONE (OUTPATIENT)
Dept: CARDIOLOGY CLINIC | Facility: CLINIC | Age: 63
End: 2023-10-16

## 2023-10-16 NOTE — TELEPHONE ENCOUNTER
Son called asking if his father may receive the flu shot from a cardiac perspective? I advised the cardiology dept does recommend the flu shot.

## 2023-10-24 ENCOUNTER — OFFICE VISIT (OUTPATIENT)
Dept: PODIATRY | Facility: CLINIC | Age: 63
End: 2023-10-24
Payer: MEDICARE

## 2023-10-24 VITALS — BODY MASS INDEX: 30.96 KG/M2 | HEIGHT: 69 IN | WEIGHT: 209 LBS

## 2023-10-24 DIAGNOSIS — E11.42 DIABETIC POLYNEUROPATHY ASSOCIATED WITH TYPE 2 DIABETES MELLITUS (HCC): ICD-10-CM

## 2023-10-24 DIAGNOSIS — I73.9 PERIPHERAL ARTERIAL DISEASE (HCC): ICD-10-CM

## 2023-10-24 DIAGNOSIS — L97.429 ULCER OF LEFT HEEL, WITH UNSPECIFIED SEVERITY (HCC): Primary | ICD-10-CM

## 2023-10-24 PROCEDURE — 99213 OFFICE O/P EST LOW 20 MIN: CPT | Performed by: PODIATRIST

## 2023-10-24 NOTE — PROGRESS NOTES
PATIENT:  Del Been    1960      ASSESSMENT:     1. Ulcer of left heel, with unspecified severity ([address])        2. Diabetic polyneuropathy associated with type 2 diabetes mellitus ([address])        3. Peripheral arterial disease ([address])            PLAN:  1. Reviewed medical records. Reviewed the note from vascular surgery. Patient was counseled on the condition and diagnosis. 2. Eschar was removed. Continue Santyl. Continue Globopedi shoe. Stressed on patient compliance about proper off-loading in bed with Prevalon boot. I told him if he does not off-load the heel, his ulcer would not heal.  3. Will see him at the wound center. Subjective:      HPI:   The patients presents for evaluation of left heel ulcer. Tolerates Santyl well. No redness. He presents with Globoped shoe today. He was not wearing Prevalon boot in bed. The following portions of the patient's history were reviewed and updated as appropriate: allergies, current medications, past family history, past medical history, past social history, past surgical history and problem list.  All pertinent labs and images were reviewed.     Past Medical History  Past Medical History:   Diagnosis Date    Cerebrovascular accident (CVA) due to thrombosis of left middle cerebral artery ([address]) 07/29/2018    Chronic kidney disease     Coronary artery disease     Diabetes mellitus ([address])     not on meds    Dialysis patient [facility])     M-W-F    Fistula     left upper arm for hemodialyis    GERD (gastroesophageal reflux disease)     History of coronary artery stent placement     x3    Hypercholesteremia     Hyperlipidemia     Hypertension     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    Limb alert care status     no BP/IV left arm    Neuropathy     Obesity     Osteomyelitis ([address])     last assessed 11/4/16-per son not currently    PVC's (premature ventricular contractions)     sees  Cardio    Stroke [facility])     last weeof July 2018 [facility]    TIA (transient ischemic attack) 10/28/2018    Wears dentures     Wears glasses        Past Surgical History  Past Surgical History:   Procedure Laterality Date    ABDOMINAL SURGERY      CARDIAC CATHETERIZATION N/A 05/02/2022    Procedure: Cardiac Coronary Angiogram;  Surgeon: Amanda Sanchez MD;  [facility]; Service: Cardiology    CARDIAC CATHETERIZATION N/A 05/02/2022    Procedure: Cardiac pci;  Surgeon: Amanda Sanchez MD;  [facility]; Service: Cardiology    CARDIAC CATHETERIZATION  02/01/2023    Procedure: Cardiac catheterization;  Surgeon: Amanda Sanchez MD;  [facility]; Service: Cardiology    CARDIAC CATHETERIZATION N/A 02/01/2023    Procedure: Cardiac pci;  Surgeon: Amanda Sanchez MD;  [facility]; Service: Cardiology    CARDIAC CATHETERIZATION N/A 02/01/2023    Procedure: Cardiac Coronary Angiogram;  Surgeon: Amanda Sanchez MD;  [facility]; Service: Cardiology    CARDIAC CATHETERIZATION N/A 02/01/2023    Procedure: Cardiac other-IVUS;  Surgeon: Amanda Sanchez MD;  [facility]: [facility]; Service: Cardiology    CHOLECYSTECTOMY      Percutaneous    COLONOSCOPY      CYSTOSCOPY      IR LOWER EXTREMITY ANGIOGRAM  9/29/2023    OTHER SURGICAL HISTORY      "stimulator to control bowel movements"    NE ESOPHAGOGASTRODUODENOSCOPY TRANSORAL DIAGNOSTIC N/A 09/27/2016    Procedure: ESOPHAGOGASTRODUODENOSCOPY (EGD); Surgeon: Mc Hemphill MD;  Location: [facility] GI [facility];   Service: Gastroenterology    NE LAPAROSCOPY SURG CHOLECYSTECTOMY N/A 02/29/2016    Procedure: LAPAROSCOPIC CHOLECYSTECTOMY ;  Surgeon: Brendan Sexton DO;  Location: [facility];  Service: General    NE Sudha Ramos 3RD+ ORD SLCTV ABDL PEL/LXTR [facility] Left 9/29/2023    Procedure: Left leg angiogram with intervention;  Surgeon: Hiral Hamilton MD;  Location: [facility];  Service: Vascular    ROTATOR CUFF REPAIR Right     SPINAL CORD STIMULATOR IMPLANT      "Medtronic interstim model # [id number]- in lower back to control bowel movements-currently turned off-battery is dead"    TOE AMPUTATION Right 10/28/2016    Procedure: 3RD TOE AMPUTATION ;  Surgeon: Hetal Castellon DPM;  Location[facility];  Service:         Allergies:  Patient has no known allergies. Medications:  Current Outpatient Medications   Medication Sig Dispense Refill    aspirin (ECOTRIN LOW STRENGTH) 81 mg EC tablet Take 81 mg by mouth daily Resume on 8/14        atorvastatin (LIPITOR) 40 mg tablet Take 1 tablet (40 mg total) by mouth daily with dinner 90 tablet 1    Blood Glucose Monitoring Suppl (True Metrix Meter) w/Device KIT Use to test blood sugars 3 times daily 1 kit 0    Blood Pressure Monitoring (BLOOD PRESSURE CUFF) MISC Use to check blood pressure before taking blood pressure medication and 1 hour after and follow instructions provided in discharge instructions based on the readings.  1 each 0    collagenase (SANTYL) ointment Apply topically daily 90 g 0    D3-50 1.25 MG (71630 UT) capsule TAKE 1 CAPSULE BY MOUTH ONE TIME PER WEEK (Patient taking differently: sunday) 12 capsule 2    divalproex sodium (DEPAKOTE SPRINKLE) 125 MG capsule TAKE 2 CAPSULES (250 MG TOTAL) BY MOUTH EVERY 12 (TWELVE) HOURS 360 capsule 1    fluticasone (FLONASE) 50 mcg/act nasal spray 1 spray into each nostril daily 9.9 mL 0    glucose blood (True Metrix Blood Glucose Test) test strip Use 1 each 3 (three) times a day Use as instructed 300 strip 1    metoprolol succinate (TOPROL-XL) 50 mg 24 hr tablet Take 1 tablet (50 mg total) by mouth 2 (two) times a day 180 tablet 3    midodrine (PROAMATINE) 2.5 mg tablet Take 1 tablet (2.5 mg total) by mouth as needed (for SBP<90 on dialysis days) 30 tablet 0    ONETOUCH DELICA LANCETS 13G MISC by Does not apply route 3 (three) times a day 270 each 1    prasugrel (EFFIENT) tablet Take 1 tablet (5 mg total) by mouth daily (Patient taking differently: Take 5 mg by mouth daily Takes with pudding) 90 tablet 3    sacubitril-valsartan (Entresto) 24-26 MG TABS Take 1 tablet by mouth in the morning Bedtime (Patient taking differently: Take 1 tablet by mouth daily at bedtime Bedtime) 30 tablet 11    Sevelamer Carbonate 2.4 g PACK VIGOROUSLY MIX CONTENTS OF 1 PACKET IN WATER (IT WILL NOT DISSOLVE) AND DRINK 3 TIMES DAILY WITH MEALS       No current facility-administered medications for this visit. Social History:  Social History     Socioeconomic History    Marital status: /Civil Union     Spouse name: Not on file    Number of children: Not on file    Years of education: Not on file    Highest education level: Not asked   Occupational History    Occupation: disabled   Tobacco Use    Smoking status: Never    Smokeless tobacco: Never   Vaping Use    Vaping Use: Never used   Substance and Sexual Activity    Alcohol use: Never    Drug use: No    Sexual activity: Not on file     Comment: defer   Other Topics Concern    Not on file   Social History Narrative    Daily caffeine consumption 2-3 servings a day     Social Determinants of Health     Financial Resource Strain: Unknown (7/13/2023)    Overall Financial Resource Strain (CARDIA)     Difficulty of Paying Living Expenses: Patient refused   Food Insecurity: No Food Insecurity (2/1/2023)    Hunger Vital Sign     Worried About Running Out of Food in the Last Year: Never true     Ran Out of Food in the Last Year: Never true   Transportation Needs: No Transportation Needs (7/13/2023)    PRAPARE - Transportation     Lack of Transportation (Medical): No     Lack of Transportation (Non-Medical): No   Physical Activity: Not on file   Stress: No Stress Concern Present (1/18/2019)    [facility]     Feeling of Stress :  Only a little   Social Connections: Unknown (1/18/2019)    Social Connection and Isolation Panel [NHANES]     Frequency of Communication with Friends and Family: Not on file     Frequency of Social Gatherings with Friends and Family: Not on file     Attends Hindu Services: Not on file     Active Member of Clubs or Organizations: Not on file     Attends Club or Organization Meetings: Not on file     Marital Status:    Intimate Partner Violence: Not At Risk (1/18/2019)    Humiliation, Afraid, Rape, and Kick questionnaire     Fear of Current or Ex-Partner: No     Emotionally Abused: No     Physically Abused: No     Sexually Abused: No   Housing Stability: Low Risk  (2/1/2023)    Housing Stability Vital Sign     Unable to Pay for Housing in the Last Year: No     Number of [address] in the Last Year: 1     Unstable Housing in the Last Year: No        Review of Systems   Constitutional:  Negative for chills and fever. Respiratory:  Negative for cough and shortness of breath. Cardiovascular:  Negative for chest pain. Gastrointestinal:  Negative for constipation, diarrhea, nausea and vomiting. Neurological:  Positive for numbness. Objective:      Ht 5' 9" (1.753 m)   Wt 94.8 kg (209 lb)   BMI 30.86 kg/m²          Physical Exam  Vitals reviewed. Constitutional:       General: He is not in acute distress. Appearance: He is well-developed. He is not toxic-appearing or diaphoretic. Cardiovascular:      Rate and Rhythm: Normal rate and regular rhythm. Pulses:           Dorsalis pedis pulses are 1+ on the right side and 0 on the left side. Posterior tibial pulses are 0 on the right side and 0 on the left side. Pulmonary:      Effort: Pulmonary effort is normal. No respiratory distress. Musculoskeletal:         General: Deformity present. No tenderness or signs of injury. Right foot: No foot drop. Left foot: No foot drop. Comments: Hammertoe deformity noted. 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. Feet:      Right foot:      Skin integrity: Dry skin present.  No ulcer, skin breakdown, erythema, warmth or callus. Left foot:      Skin integrity: Dry skin present. No ulcer, skin breakdown, erythema, warmth or callus. Skin:     General: Skin is warm. Capillary Refill: Capillary refill takes less than 2 seconds. Coloration: Skin is not cyanotic or mottled. Findings: No ecchymosis. Nails: There is no clubbing. Comments: Eschar left heel is loose. Wound does not probe to bone. It is 1.0 X 1.0 cm. No cellulitis. Neurological:      General: No focal deficit present. Mental Status: He is alert and oriented to person, place, and time. Cranial Nerves: No cranial nerve deficit. Sensory: Sensory deficit present. Motor: No weakness. Psychiatric:         Mood and Affect: Mood normal.         Behavior: Behavior normal.         Thought Content:  Thought content normal.         Judgment: Judgment normal.

## 2023-10-28 ENCOUNTER — HOSPITAL ENCOUNTER (EMERGENCY)
Facility: HOSPITAL | Age: 63
Discharge: HOME | End: 2023-10-28
Attending: EMERGENCY MEDICINE
Payer: MEDICARE

## 2023-10-28 VITALS
DIASTOLIC BLOOD PRESSURE: 51 MMHG | HEART RATE: 63 BPM | TEMPERATURE: 98 F | OXYGEN SATURATION: 97 % | SYSTOLIC BLOOD PRESSURE: 103 MMHG | RESPIRATION RATE: 18 BRPM

## 2023-10-28 DIAGNOSIS — D69.6 THROMBOCYTOPENIA (HCC): ICD-10-CM

## 2023-10-28 DIAGNOSIS — N18.6 ESRD ON DIALYSIS (HCC): ICD-10-CM

## 2023-10-28 DIAGNOSIS — I95.89 HYPOTENSION DUE TO HYPOVOLEMIA: Primary | ICD-10-CM

## 2023-10-28 DIAGNOSIS — Z99.2 ESRD ON DIALYSIS (HCC): ICD-10-CM

## 2023-10-28 DIAGNOSIS — E86.1 HYPOTENSION DUE TO HYPOVOLEMIA: Primary | ICD-10-CM

## 2023-10-28 LAB
ALBUMIN SERPL BCP-MCNC: 4 G/DL (ref 3.5–5)
ALP SERPL-CCNC: 100 U/L (ref 34–104)
ALT SERPL W P-5'-P-CCNC: 5 U/L (ref 7–52)
ANION GAP SERPL CALCULATED.3IONS-SCNC: 8 MMOL/L
AST SERPL W P-5'-P-CCNC: 5 U/L (ref 13–39)
BASOPHILS # BLD AUTO: 0.01 THOUSANDS/ÂΜL (ref 0–0.1)
BASOPHILS NFR BLD AUTO: 0 % (ref 0–1)
BILIRUB SERPL-MCNC: 0.63 MG/DL (ref 0.2–1)
BUN SERPL-MCNC: 33 MG/DL (ref 5–25)
CALCIUM SERPL-MCNC: 8.5 MG/DL (ref 8.4–10.2)
CHLORIDE SERPL-SCNC: 97 MMOL/L (ref 96–108)
CO2 SERPL-SCNC: 33 MMOL/L (ref 21–32)
CREAT SERPL-MCNC: 5.42 MG/DL (ref 0.6–1.3)
EOSINOPHIL # BLD AUTO: 0.08 THOUSAND/ÂΜL (ref 0–0.61)
EOSINOPHIL NFR BLD AUTO: 2 % (ref 0–6)
ERYTHROCYTE [DISTWIDTH] IN BLOOD BY AUTOMATED COUNT: 15 % (ref 11.6–15.1)
GFR SERPL CREATININE-BSD FRML MDRD: 10 ML/MIN/1.73SQ M
GLUCOSE SERPL-MCNC: 97 MG/DL (ref 65–140)
HCT VFR BLD AUTO: 28.9 % (ref 36.5–49.3)
HGB BLD-MCNC: 9 G/DL (ref 12–17)
IMM GRANULOCYTES # BLD AUTO: 0.01 THOUSAND/UL (ref 0–0.2)
IMM GRANULOCYTES NFR BLD AUTO: 0 % (ref 0–2)
LYMPHOCYTES # BLD AUTO: 0.86 THOUSANDS/ÂΜL (ref 0.6–4.47)
LYMPHOCYTES NFR BLD AUTO: 19 % (ref 14–44)
MCH RBC QN AUTO: 31.4 PG (ref 26.8–34.3)
MCHC RBC AUTO-ENTMCNC: 31.1 G/DL (ref 31.4–37.4)
MCV RBC AUTO: 101 FL (ref 82–98)
MONOCYTES # BLD AUTO: 0.5 THOUSAND/ÂΜL (ref 0.17–1.22)
MONOCYTES NFR BLD AUTO: 11 % (ref 4–12)
NEUTROPHILS # BLD AUTO: 3.05 THOUSANDS/ÂΜL (ref 1.85–7.62)
NEUTS SEG NFR BLD AUTO: 68 % (ref 43–75)
NRBC BLD AUTO-RTO: 0 /100 WBCS
PLATELET # BLD AUTO: 98 THOUSANDS/UL (ref 149–390)
PMV BLD AUTO: 12.2 FL (ref 8.9–12.7)
POTASSIUM SERPL-SCNC: 4.1 MMOL/L (ref 3.5–5.3)
PROT SERPL-MCNC: 6.7 G/DL (ref 6.4–8.4)
RBC # BLD AUTO: 2.87 MILLION/UL (ref 3.88–5.62)
SODIUM SERPL-SCNC: 138 MMOL/L (ref 135–147)
WBC # BLD AUTO: 4.51 THOUSAND/UL (ref 4.31–10.16)

## 2023-10-28 PROCEDURE — 85025 COMPLETE CBC W/AUTO DIFF WBC: CPT

## 2023-10-28 PROCEDURE — 99284 EMERGENCY DEPT VISIT MOD MDM: CPT | Performed by: EMERGENCY MEDICINE

## 2023-10-28 PROCEDURE — 99285 EMERGENCY DEPT VISIT HI MDM: CPT

## 2023-10-28 PROCEDURE — 96361 HYDRATE IV INFUSION ADD-ON: CPT

## 2023-10-28 PROCEDURE — 93005 ELECTROCARDIOGRAM TRACING: CPT

## 2023-10-28 PROCEDURE — 80053 COMPREHEN METABOLIC PANEL: CPT

## 2023-10-28 PROCEDURE — 96365 THER/PROPH/DIAG IV INF INIT: CPT

## 2023-10-28 PROCEDURE — 36415 COLL VENOUS BLD VENIPUNCTURE: CPT

## 2023-10-28 RX ORDER — ACETAMINOPHEN 325 MG/1
975 TABLET ORAL ONCE
Status: COMPLETED | OUTPATIENT
Start: 2023-10-28 | End: 2023-10-28

## 2023-10-28 RX ADMIN — SODIUM CHLORIDE 250 ML: 0.9 INJECTION, SOLUTION INTRAVENOUS at 02:49

## 2023-10-28 RX ADMIN — ACETAMINOPHEN 975 MG: 325 TABLET, FILM COATED ORAL at 02:49

## 2023-10-28 RX ADMIN — SODIUM CHLORIDE, SODIUM LACTATE, POTASSIUM CHLORIDE, AND CALCIUM CHLORIDE 250 ML: .6; .31; .03; .02 INJECTION, SOLUTION INTRAVENOUS at 03:50

## 2023-10-28 NOTE — ED PROVIDER NOTES
History  Chief Complaint   Patient presents with    Weakness - Generalized     Pt had low BP during dialysis this morning and since then he has had body aches and generalized weakness. + Dizziness, -CP/ Headaches      Patient is a 79-year-old male with history of stroke and renal failure on hemodialysis that presents to the emergency department with labile blood pressure. Patient was at hemodialysis today when there was an unknown problem with the catheter requiring replacement of the catheter. Patient's loved one reports that since that time he has had normal blood pressure alternating with low blood pressures. She reports that he awoke in the middle of the night complaining of lightheadedness and nausea. The patient had no vomiting. No recent illnesses, no fevers at home, no chest pain, shortness of breath, abdominal pain. Patient reports that he is currently feeling no symptoms and would like to go home. He denies any recent injuries. Prior to Admission Medications   Prescriptions Last Dose Informant Patient Reported? Taking? Blood Glucose Monitoring Suppl (True Metrix Meter) w/Device KIT  Child, Self No No   Sig: Use to test blood sugars 3 times daily   Blood Pressure Monitoring (BLOOD PRESSURE CUFF) MISC  Child, Self No No   Sig: Use to check blood pressure before taking blood pressure medication and 1 hour after and follow instructions provided in discharge instructions based on the readings.    D3-50 1.25 MG (44324 UT) capsule  Child, Self No No   Sig: TAKE 1 CAPSULE BY MOUTH ONE TIME PER WEEK   Patient taking differently: sunday   ONETOUCH DELICA LANCETS 06K MISC  Child, Self No No   Sig: by Does not apply route 3 (three) times a day   Sevelamer Carbonate 2.4 g PACK  Child, Self Yes No   Sig: VIGOROUSLY MIX CONTENTS OF 1 PACKET IN WATER (IT WILL NOT DISSOLVE) AND DRINK 3 TIMES DAILY WITH MEALS   aspirin (ECOTRIN LOW STRENGTH) 81 mg EC tablet  Child, Self Yes No   Sig: Take 81 mg by mouth daily Resume on      atorvastatin (LIPITOR) 40 mg tablet  Child, Self No No   Sig: Take 1 tablet (40 mg total) by mouth daily with dinner   collagenase (SANTYL) ointment  Child, Self No No   Sig: Apply topically daily   divalproex sodium (DEPAKOTE SPRINKLE) 125 MG capsule  Child, Self No No   Sig: TAKE 2 CAPSULES (250 MG TOTAL) BY MOUTH EVERY 12 (TWELVE) HOURS   fluticasone (FLONASE) 50 mcg/act nasal spray  Child, Self No No   Si spray into each nostril daily   glucose blood (True Metrix Blood Glucose Test) test strip  Child, Self No No   Sig: Use 1 each 3 (three) times a day Use as instructed   metoprolol succinate (TOPROL-XL) 50 mg 24 hr tablet  Child, Self No No   Sig: Take 1 tablet (50 mg total) by mouth 2 (two) times a day   midodrine (PROAMATINE) 2.5 mg tablet  Child, Self No No   Sig: Take 1 tablet (2.5 mg total) by mouth as needed (for SBP<90 on dialysis days)   prasugrel (EFFIENT) tablet  Child, Self No No   Sig: Take 1 tablet (5 mg total) by mouth daily   Patient taking differently: Take 5 mg by mouth daily Takes with pudding   sacubitril-valsartan (Entresto) 24-26 MG TABS  Child, Self No No   Sig: Take 1 tablet by mouth in the morning Bedtime   Patient taking differently: Take 1 tablet by mouth daily at bedtime Bedtime      Facility-Administered Medications: None       Past Medical History:   Diagnosis Date    Cerebrovascular accident (CVA) due to thrombosis of left middle cerebral artery ([address]) 2018    Chronic kidney disease     Coronary artery disease     Diabetes mellitus (HCC)     not on meds    Dialysis patient [facility])     M-W-F    Fistula     left upper arm for hemodialyis    GERD (gastroesophageal reflux disease)     History of coronary artery stent placement     x3    Hypercholesteremia     Hyperlipidemia     Hypertension     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    Limb alert care status     no BP/IV left arm    Neuropathy     Obesity     Osteomyelitis ([address])     last assessed 16-per son not currently    PVC's (premature ventricular contractions)     sees [facility]    Stroke [facility])     last weeof July 2018 [facility]TY [facility]    TIA (transient ischemic attack) 10/28/2018    Wears dentures     Wears glasses        Past Surgical History:   Procedure Laterality Date    ABDOMINAL SURGERY      CARDIAC CATHETERIZATION N/A 05/02/2022    Procedure: Cardiac Coronary Angiogram;  Surgeon: Lisbet Cadet MD;  [facility] CARDIAC CATH [facility]; Service: Cardiology    CARDIAC CATHETERIZATION N/A 05/02/2022    Procedure: Cardiac pci;  Surgeon: Lisbet Cadet MD;  [facility]: [facility] CARDIAC [facility]; Service: Cardiology    CARDIAC CATHETERIZATION  02/01/2023    Procedure: Cardiac catheterization;  Surgeon: Lisbet Cadet MD;  [facility]: [facility] CARDIAC [facility]; Service: Cardiology    CARDIAC CATHETERIZATION N/A 02/01/2023    Procedure: Cardiac pci;  Surgeon: Lisbet Cadet MD;  [facility]: [facility] CARDIAC [facility]ATH [facility]; Service: Cardiology    CARDIAC CATHETERIZATION N/A 02/01/2023    Procedure: Cardiac Coronary Angiogram;  Surgeon: Lisbet Cadet MD;  [facility]: [facility] CARDIAC [facility]; Service: Cardiology    CARDIAC CATHETERIZATION N/A 02/01/2023    Procedure: Cardiac other-IVUS;  Surgeon: Lisbet Cadet MD;  [facility]: [facility] CARDIAC CATH [facility]; Service: Cardiology    CHOLECYSTECTOMY      Percutaneous    COLONOSCOPY      CYSTOSCOPY      IR LOWER EXTREMITY ANGIOGRAM  9/29/2023    OTHER SURGICAL HISTORY      "stimulator to control bowel movements"    WA ESOPHAGOGASTRODUODENOSCOPY TRANSORAL DIAGNOSTIC N/A 09/27/2016    Procedure: ESOPHAGOGASTRODUODENOSCOPY (EGD); Surgeon: Tylor Mabry MD;  Location: [facility] GI LAB;   Service: Gastroenterology    WA LAPAROSCOPY SURG CHOLECYSTECTOMY N/A 02/29/2016    Procedure: LAPAROSCOPIC CHOLECYSTECTOMY ;  Surgeon: Lina Cosme DO;  Location: [facility] Main OR;  Service: General    WA Michelle Titus 3RD+ ORD SLCTV ABDL PEL/LXTR [facility] Left 9/29/2023    Procedure: Left leg angiogram with intervention;  Surgeon: Kathrine Garcia Maxi Sena MD;  Location: [facility] MAIN OR;  Service: Vascular    ROTATOR CUFF REPAIR Right     SPINAL CORD STIMULATOR IMPLANT      "Medtronic interstim model # [id number]- in lower back to control bowel movements-currently turned off-battery is dead"    TOE AMPUTATION Right 10/28/2016    Procedure: 3RD TOE AMPUTATION ;  Surgeon: Jesus Chris DPM;  Location: [facility] Main OR;  Service:        Family History   Problem Relation Age of Onset    Leukemia Mother     Liver disease Mother     Lung cancer Mother         heavy smoker - 3 ppd    Heart disease Father     Liver disease Father     Diabetes type I Father     Multiple myeloma Sister     Breast cancer Sister     Urolithiasis Family     Alcohol abuse Neg Hx     Depression Neg Hx     Drug abuse Neg Hx     Substance Abuse Neg Hx     Mental illness Neg Hx      I have reviewed and agree with the history as documented. E-Cigarette/Vaping    E-Cigarette Use Never User      E-Cigarette/Vaping Substances    Nicotine No     THC No     CBD No     Flavoring No     Other No     Unknown No      Social History     Tobacco Use    Smoking status: Never    Smokeless tobacco: Never   Vaping Use    Vaping Use: Never used   Substance Use Topics    Alcohol use: Never    Drug use: No        Review of Systems   Constitutional:  Negative for chills and fever. Eyes:  Negative for photophobia and visual disturbance. Respiratory:  Negative for cough and shortness of breath. Cardiovascular:  Negative for chest pain and palpitations. Gastrointestinal:  Positive for nausea (Resolved). Negative for abdominal pain, diarrhea and vomiting. Genitourinary:  Negative for dysuria and hematuria. Musculoskeletal:  Negative for arthralgias and back pain. Skin:  Negative for color change and rash. Neurological:  Positive for light-headedness (Resolved). Negative for seizures and syncope. All other systems reviewed and are negative.       Physical Exam  ED Triage Vitals   Temperature Pulse Respirations Blood Pressure SpO2   10/28/23 0152 10/28/23 0151 10/28/23 0151 10/28/23 0151 10/28/23 0151   98 °F (36.7 °C) 68 18 (!) 96/48 100 %      Temp Source Heart Rate Source Patient Position - Orthostatic VS BP Location FiO2 (%)   10/28/23 0152 10/28/23 0151 10/28/23 0151 10/28/23 0151 --   Oral Monitor Lying Right arm       Pain Score       --                    Orthostatic Vital Signs  Vitals:    10/28/23 0330 10/28/23 0346 10/28/23 0400 10/28/23 0415   BP: (S) (!) 83/46 113/55 115/59 103/51   Pulse: 62 67 63    Patient Position - Orthostatic VS:  Sitting Lying        Physical Exam  Vitals and nursing note reviewed. Constitutional:       General: He is not in acute distress. Appearance: He is well-developed. He is not ill-appearing. HENT:      Head: Normocephalic and atraumatic. Right Ear: External ear normal.      Left Ear: External ear normal.   Eyes:      Extraocular Movements: Extraocular movements intact. Conjunctiva/sclera: Conjunctivae normal.   Cardiovascular:      Rate and Rhythm: Normal rate and regular rhythm. Pulses: Normal pulses. Heart sounds: Normal heart sounds. No murmur heard. Pulmonary:      Effort: Pulmonary effort is normal. No respiratory distress. Breath sounds: Normal breath sounds. No wheezing, rhonchi or rales. Abdominal:      General: Abdomen is flat. Palpations: Abdomen is soft. Tenderness: There is no abdominal tenderness. There is no guarding or rebound. Musculoskeletal:         General: No swelling, tenderness or deformity. Normal range of motion. Cervical back: Normal range of motion and neck supple. Right lower leg: No edema. Left lower leg: No edema. Skin:     General: Skin is warm and dry. Capillary Refill: Capillary refill takes less than 2 seconds. Neurological:      Mental Status: He is alert and oriented to person, place, and time.          ED Medications  Medications   sodium chloride 0.9 % bolus 250 mL (0 mL Intravenous Stopped 10/28/23 0342)   acetaminophen (TYLENOL) tablet 975 mg (975 mg Oral Given 10/28/23 0249)   lactated ringers bolus 250 mL (0 mL Intravenous Stopped 10/28/23 0424)       Diagnostic Studies  Results Reviewed       Procedure Component Value Units Date/Time    CBC and differential [[id number]]  (Abnormal) Collected: 10/28/23 0234    Lab Status: Final result Specimen: Blood from Arm, Right Updated: 10/28/23 0414     WBC 4.51 Thousand/uL      RBC 2.87 Million/uL      Hemoglobin 9.0 g/dL      Hematocrit 28.9 %       fL      MCH 31.4 pg      MCHC 31.1 g/dL      RDW 15.0 %      MPV 12.2 fL      Platelets 98 Thousands/uL      nRBC 0 /100 WBCs      Neutrophils Relative 68 %      Immat GRANS % 0 %      Lymphocytes Relative 19 %      Monocytes Relative 11 %      Eosinophils Relative 2 %      Basophils Relative 0 %      Neutrophils Absolute 3.05 Thousands/µL      Immature Grans Absolute 0.01 Thousand/uL      Lymphocytes Absolute 0.86 Thousands/µL      Monocytes Absolute 0.50 Thousand/µL      Eosinophils Absolute 0.08 Thousand/µL      Basophils Absolute 0.01 Thousands/µL     Comprehensive metabolic panel [[id number]]  (Abnormal) Collected: 10/28/23 0234    Lab Status: Final result Specimen: Blood from Arm, Right Updated: 10/28/23 0308     Sodium 138 mmol/L      Potassium 4.1 mmol/L      Chloride 97 mmol/L      CO2 33 mmol/L      ANION GAP 8 mmol/L      BUN 33 mg/dL      Creatinine 5.42 mg/dL      Glucose 97 mg/dL      Calcium 8.5 mg/dL      AST 5 U/L      ALT 5 U/L      Alkaline Phosphatase 100 U/L      Total Protein 6.7 g/dL      Albumin 4.0 g/dL      Total Bilirubin 0.63 mg/dL      eGFR 10 ml/min/1.73sq m     Narrative:      [place] guidelines for Chronic Kidney Disease (CKD):     Stage 1 with normal or high GFR (GFR > 90 mL/min/1.73 square meters)    Stage 2 Mild CKD (GFR = 60-89 mL/min/1.73 square meters)    Stage 3A Moderate CKD (GFR = 45-59 mL/min/1.73 square meters)    Stage 3B Moderate CKD (GFR = 30-44 mL/min/1.73 square meters)    Stage 4 Severe CKD (GFR = 15-29 mL/min/1.73 square meters)    Stage 5 End Stage CKD (GFR <15 mL/min/1.73 square meters)  Note: GFR calculation is accurate only with a steady state creatinine                   No orders to display         Procedures  ECG 12 Lead Documentation Only    Date/Time: 10/28/2023 6:56 AM    Performed by: Raquel Jaramillo DO  Authorized by: Raquel Jaramillo DO    Indications / Diagnosis:  Hypotension  ECG reviewed by me, the ED Provider: yes    Patient location:  ED  Previous ECG:     Previous ECG:  Compared to current    Similarity:  No change  Interpretation:     Interpretation: normal    Rate:     ECG rate:  63    ECG rate assessment: normal    Rhythm:     Rhythm: sinus rhythm    Ectopy:     Ectopy: none    QRS:     QRS axis:  Normal    QRS intervals:  Normal  Conduction:     Conduction: abnormal      Abnormal conduction: complete RBBB    ST segments:     ST segments:  Normal  T waves:     T waves: normal          ED Course                                       Medical Decision Making  80E with history of stroke and end-stage renal disease on dialysis presents to the emergency department with intermittent low blood pressure occurring after a difficulty that occurred during dialysis yesterday. He awoke with nausea and lightheadedness overnight. The symptoms have resolved by presentation to the emergency department. On physical exam, patient has no abnormal findings. Laboratory evaluation, patient has significantly elevated creatinine which is consistent with his baseline renal failure. Patient is also no chronically anemic and with a hemoglobin of 9.0 is similar to his prior. ECG nonconcerning for acute ischemia or dysrhythmia.   Patient does have labile blood pressure while in the emergency department, however has several normal readings throughout the last half hour of his stay and was asymptomatic throughout his entire stay. Low suspicion for consequential hypotension. Discussed with patient and his significant other return precautions and they demonstrate understanding of this. In the absence of concerning findings on physical exam, laboratory evaluation, or ecg patient is appropriate for discharge at this time. Return precautions are discussed with the patient and they demonstrate understanding of the plan. The patient's questions are all answered to the their satisfaction and the patient is discharged home. Amount and/or Complexity of Data Reviewed  Labs: ordered. Risk  OTC drugs. Disposition  Final diagnoses:   Hypotension due to hypovolemia   ESRD on dialysis ([address])   Thrombocytopenia ([address])     Time reflects when diagnosis was documented in both MDM as applicable and the Disposition within this note       Time User Action Codes Description Comment    10/28/2023  4:20 AM Annika Eldridge Add [I95.89,  E86.1] Hypotension due to hypovolemia     10/28/2023  4:21 AM Annika Eldridge Add [N18.6,  Z99.2] ESRD on dialysis [facility])     10/28/2023  4:21 AM Annika Eldridge Add [D69.6] Thrombocytopenia [facility])           ED Disposition       ED Disposition   Discharge    Condition   Stable    Date/Time   Sat Oct 28, 2023  4:19 AM    Comment   Briseida Galloway discharge to home/self care.                    Follow-up Information    None         Discharge Medication List as of 10/28/2023  4:21 AM        CONTINUE these medications which have NOT CHANGED    Details   aspirin (ECOTRIN LOW STRENGTH) 81 mg EC tablet Take 81 mg by mouth daily Resume on 8/14  , Historical Med      atorvastatin (LIPITOR) 40 mg tablet Take 1 tablet (40 mg total) by mouth daily with dinner, Starting Thu 7/20/2023, Normal      Blood Glucose Monitoring Suppl (True Metrix Meter) w/Device KIT Use to test blood sugars 3 times daily, Normal      Blood Pressure Monitoring (BLOOD PRESSURE CUFF) MISC Use to check blood pressure before taking blood pressure medication and 1 hour after and follow instructions provided in discharge instructions based on the readings. , Print      collagenase (SANTYL) ointment Apply topically daily, Starting Tue 6/27/2023, Normal      D3-50 1.25 MG (33033 UT) capsule TAKE 1 CAPSULE BY MOUTH ONE TIME PER WEEK, Normal      divalproex sodium (DEPAKOTE SPRINKLE) 125 MG capsule TAKE 2 CAPSULES (250 MG TOTAL) BY MOUTH EVERY 12 (TWELVE) HOURS, Starting Tue 6/20/2023, Normal      fluticasone (FLONASE) 50 mcg/act nasal spray 1 spray into each nostril daily, Starting Sun 7/9/2023, Normal      glucose blood (True Metrix Blood Glucose Test) test strip Use 1 each 3 (three) times a day Use as instructed, Starting Wed 3/15/2023, Normal      metoprolol succinate (TOPROL-XL) 50 mg 24 hr tablet Take 1 tablet (50 mg total) by mouth 2 (two) times a day, Starting Fri 3/17/2023, Normal      midodrine (PROAMATINE) 2.5 mg tablet Take 1 tablet (2.5 mg total) by mouth as needed (for SBP<90 on dialysis days), Starting Mon 6/5/2023, Normal      ONETOUCH DELICA LANCETS 01R MISC by Does not apply route 3 (three) times a day, Starting Tue 11/27/2018, Normal      prasugrel (EFFIENT) tablet Take 1 tablet (5 mg total) by mouth daily, Starting Tue 2/14/2023, Normal      sacubitril-valsartan (Entresto) 24-26 MG TABS Take 1 tablet by mouth in the morning Bedtime, Starting Tue 8/22/2023, Normal      Sevelamer Carbonate 2.4 g PACK VIGOROUSLY MIX CONTENTS OF 1 PACKET IN WATER (IT WILL NOT DISSOLVE) AND DRINK 3 TIMES DAILY WITH MEALS, Historical Med           No discharge procedures on file. PDMP Review         Value Time User    PDMP Reviewed  Yes 7/16/2023  1:52 AM Tim Briscoe MD             ED Provider  Attending physically available and evaluated Ritesh Trinh I managed the patient along with the ED Attending.     Electronically Signed by           Vincenzo Proctor DO  10/28/23 6037

## 2023-10-28 NOTE — ED ATTENDING ATTESTATION
10/28/2023  IKarthikeyan, DO, saw and evaluated the patient. I have discussed the patient with the resident/non-physician practitioner and agree with the resident's/non-physician practitioner's findings, Plan of Care, and MDM as documented in the resident's/non-physician practitioner's note, except where noted. All available labs and Radiology studies were reviewed. I was present for key portions of any procedure(s) performed by the resident/non-physician practitioner and I was immediately available to provide assistance. At this point I agree with the current assessment done in the Emergency Department. I have conducted an independent evaluation of this patient a history and physical is as follows:          24-year-old male, end-stage renal disease on dialysis, today after dialysis felt weak, dizzy, had low blood pressure, came here for evaluation, no cough no URI symptoms no chest pain no abdominal pain, prior to dialysis felt like he was completely at baseline.,  No falls no injury no trauma.     Given 250 cc bolus of fluid feels greatly improved feels like he is back at baseline, likely a degree of hypovolemia secondary to dialysis, as he feels back to normal and has maintained normal blood pressures will discharge          ED Course  ED Course as of 10/28/23 0419   Sat Oct 28, 2023   0419 Patient's blood pressure within normal range, he feels completely back to baseline, requesting to go home, will discharge         Critical Care Time  Procedures

## 2023-10-29 LAB
ATRIAL RATE: 63 BPM
P AXIS: 43 DEGREES
PR INTERVAL: 176 MS
QRS AXIS: 7 DEGREES
QRSD INTERVAL: 158 MS
QT INTERVAL: 522 MS
QTC INTERVAL: 534 MS
T WAVE AXIS: 24 DEGREES
VENTRICULAR RATE: 63 BPM

## 2023-10-29 PROCEDURE — 93010 ELECTROCARDIOGRAM REPORT: CPT | Performed by: INTERNAL MEDICINE

## 2023-10-30 ENCOUNTER — VBI (OUTPATIENT)
Dept: INTERNAL MEDICINE CLINIC | Facility: CLINIC | Age: 63
End: 2023-10-30

## 2023-10-30 NOTE — TELEPHONE ENCOUNTER
10/30/23 1:06 PM    Patient contacted post ED visit, first outreach attempt made. Message was left for patient to return a call to the V[facility] Department at [facility]: Phone [phone]. Thank you.   Val Gonzalez  [facility]

## 2023-10-31 NOTE — TELEPHONE ENCOUNTER
10/31/23 9:00 AM    Patient contacted post ED visit, VBI department spoke with patient/caregiver and outreach was successful. Thank you.   Jerrica Wallace MA  [facility]

## 2023-11-06 DIAGNOSIS — Z79.4 TYPE 2 DIABETES MELLITUS WITH HYPERGLYCEMIA, WITH LONG-TERM CURRENT USE OF INSULIN (HCC): ICD-10-CM

## 2023-11-06 DIAGNOSIS — E11.65 TYPE 2 DIABETES MELLITUS WITH HYPERGLYCEMIA, WITH LONG-TERM CURRENT USE OF INSULIN (HCC): ICD-10-CM

## 2023-11-06 RX ORDER — CALCIUM CITRATE/VITAMIN D3 200MG-6.25
TABLET ORAL
Qty: 300 STRIP | Refills: 1 | Status: SHIPPED | OUTPATIENT
Start: 2023-11-06

## 2023-11-07 ENCOUNTER — TELEPHONE (OUTPATIENT)
Age: 63
End: 2023-11-07

## 2023-11-07 ENCOUNTER — OFFICE VISIT (OUTPATIENT)
Dept: PODIATRY | Facility: CLINIC | Age: 63
End: 2023-11-07
Payer: MEDICARE

## 2023-11-07 VITALS — WEIGHT: 210 LBS | BODY MASS INDEX: 31.1 KG/M2 | HEIGHT: 69 IN

## 2023-11-07 DIAGNOSIS — E11.42 DIABETIC POLYNEUROPATHY ASSOCIATED WITH TYPE 2 DIABETES MELLITUS (HCC): ICD-10-CM

## 2023-11-07 DIAGNOSIS — M79.672 PAIN OF LEFT HEEL: ICD-10-CM

## 2023-11-07 DIAGNOSIS — L97.429 ULCER OF LEFT HEEL, WITH UNSPECIFIED SEVERITY (HCC): Primary | ICD-10-CM

## 2023-11-07 PROCEDURE — 99213 OFFICE O/P EST LOW 20 MIN: CPT | Performed by: PODIATRIST

## 2023-11-07 RX ORDER — LIDOCAINE AND PRILOCAINE 25; 25 MG/G; MG/G
CREAM TOPICAL AS NEEDED
Qty: 30 G | Refills: 3 | Status: SHIPPED | OUTPATIENT
Start: 2023-11-07

## 2023-11-07 NOTE — PROGRESS NOTES
PATIENT:  Mary Jackson    1960      ASSESSMENT:     1. Ulcer of left heel, with unspecified severity (HCC)  lidocaine-prilocaine (EMLA) cream      2. Diabetic polyneuropathy associated with type 2 diabetes mellitus (HCC)        3. Pain of left heel  lidocaine-prilocaine (EMLA) cream          PLAN:  1. Reviewed medical records. Reviewed the note from vascular surgery. Patient was counseled on the condition and diagnosis. 2. He has wound without infection on left heel. Pain is due to ischemia. Will use EMLA cream to periwound to help with pain. Continue Santyl. 3. Continue Globopedi shoe. Stressed on patient compliance about proper off-loading in bed with Prevalon boot. His ulcer would not heal without proper off-loading and patient compliance. 4. Will see him at the wound center next week. Subjective:      HPI:   The patients presents for evaluation of left heel ulcer. He called today because of pain. He has not been wearing Prevalon boot in bed. The following portions of the patient's history were reviewed and updated as appropriate: allergies, current medications, past family history, past medical history, past social history, past surgical history and problem list.  All pertinent labs and images were reviewed.     Past Medical History  Past Medical History:   Diagnosis Date    Cerebrovascular accident (CVA) due to thrombosis of left middle cerebral artery ([address]) 07/29/2018    Chronic kidney disease     Coronary artery disease     Diabetes mellitus ([address])     not on meds    Dialysis patient [facility])     M-W-F    Fistula     left upper arm for hemodialyis    GERD (gastroesophageal reflux disease)     History of coronary artery stent placement     x3    Hypercholesteremia     Hyperlipidemia     Hypertension     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    Limb alert care status     no BP/IV left arm    Neuropathy     Obesity     Osteomyelitis ([address])     last assessed 11/4/16-per son not currently    PVC's (premature ventricular contractions)     sees [facility]    Stroke [facility])     last weeof July 2018 [facility]    TIA (transient ischemic attack) 10/28/2018    Wears dentures     Wears glasses        Past Surgical History  Past Surgical History:   Procedure Laterality Date    ABDOMINAL SURGERY      CARDIAC CATHETERIZATION N/A 05/02/2022    Procedure: Cardiac Coronary Angiogram;  Surgeon: Evelina De La Cruz MD;  [facility] CARD[facility]AC [facility]; Service: Cardiology    CARDIAC CATHETERIZATION N/A 05/02/2022    Procedure: Cardiac pci;  Surgeon: Evelina De La Cruz MD;  [facility]ARD[facility]; Service: Cardiology    CARDIAC CATHETERIZATION  02/01/2023    Procedure: Cardiac catheterization;  Surgeon: Evelina De La Cruz MD;  [facility]ARD[facility]AC [facility]; Service: Cardiology    CARDIAC CATHETERIZATION N/A 02/01/2023    Procedure: Cardiac pci;  Surgeon: Evelina De La Cruz MD;  [facility]: [facility]ARD[facility]; Service: Cardiology    CARDIAC CATHETERIZATION N/A 02/01/2023    Procedure: Cardiac Coronary Angiogram;  Surgeon: Evelina De La Cruz MD;  [facility]: [facility]ARD[facility]; Service: Cardiology    CARDIAC CATHETERIZATION N/A 02/01/2023    Procedure: Cardiac other-IVUS;  Surgeon: Evelina De La Cruz MD;  [facility]: [facility] CARDIAC [facility]; Service: Cardiology    CHOLECYSTECTOMY      Percutaneous    COLONOSCOPY      CYSTOSCOPY      IR LOWER EXTREMITY ANGIOGRAM  9/29/2023    OTHER SURGICAL HISTORY      "stimulator to control bowel movements"    WI ESOPHAGOGASTRODUODENOSCOPY TRANSORAL DIAGNOSTIC N/A 09/27/2016    Procedure: ESOPHAGOGASTRODUODENOSCOPY (EGD); Surgeon: Maryjo Steve MD;  Location: [facility] GI LAB;   Service: Gastroenterology    WI LAPAROSCOPY SURG CHOLECYSTECTOMY N/A 02/29/2016    Procedure: LAPAROSCOPIC CHOLECYSTECTOMY ;  Surgeon: Zev Barraza DO;  Location: [facility] Main OR;  Service: General    WI Tanisha Lamas 3RD+ ORD SLCTV ABDL PEL/LXTR [facility] Left 9/29/2023    Procedure: Left leg angiogram with intervention; Surgeon: Jonathan Lamas MD;  Location: [facility] MAIN OR;  Service: Vascular    ROTATOR CUFF REPAIR Right     SPINAL CORD STIMULATOR IMPLANT      "Medtronic interstim model # [id number]- in lower back to control bowel movements-currently turned off-battery is dead"    TOE AMPUTATION Right 10/28/2016    Procedure: 3RD TOE AMPUTATION ;  Surgeon: Andrea Gutierrez DPM;  Location: [facility] Main [facility];  Service:         Allergies:  Patient has no known allergies. Medications:  Current Outpatient Medications   Medication Sig Dispense Refill    aspirin (ECOTRIN LOW STRENGTH) 81 mg EC tablet Take 81 mg by mouth daily Resume on 8/14        atorvastatin (LIPITOR) 40 mg tablet Take 1 tablet (40 mg total) by mouth daily with dinner 90 tablet 1    Blood Glucose Monitoring Suppl (True Metrix Meter) w/Device KIT Use to test blood sugars 3 times daily 1 kit 0    Blood Pressure Monitoring (BLOOD PRESSURE CUFF) MISC Use to check blood pressure before taking blood pressure medication and 1 hour after and follow instructions provided in discharge instructions based on the readings.  1 each 0    collagenase (SANTYL) ointment Apply topically daily 90 g 0    divalproex sodium (DEPAKOTE SPRINKLE) 125 MG capsule TAKE 2 CAPSULES (250 MG TOTAL) BY MOUTH EVERY 12 (TWELVE) HOURS 360 capsule 1    fluticasone (FLONASE) 50 mcg/act nasal spray 1 spray into each nostril daily 9.9 mL 0    lidocaine-prilocaine (EMLA) cream Apply topically as needed for mild pain 30 g 3    metoprolol succinate (TOPROL-XL) 50 mg 24 hr tablet Take 1 tablet (50 mg total) by mouth 2 (two) times a day 180 tablet 3    midodrine (PROAMATINE) 2.5 mg tablet Take 1 tablet (2.5 mg total) by mouth as needed (for SBP<90 on dialysis days) 30 tablet 0    ONETOUCH DELICA LANCETS 70C MISC by Does not apply route 3 (three) times a day 270 each 1    prasugrel (EFFIENT) tablet Take 1 tablet (5 mg total) by mouth daily (Patient taking differently: Take 5 mg by mouth daily Takes with pudding) 90 tablet 3    sacubitril-valsartan (Entresto) 24-26 MG TABS Take 1 tablet by mouth in the morning Bedtime (Patient taking differently: Take 1 tablet by mouth daily at bedtime Bedtime) 30 tablet 11    Sevelamer Carbonate 2.4 g PACK VIGOROUSLY MIX CONTENTS OF 1 PACKET IN WATER (IT WILL NOT DISSOLVE) AND DRINK 3 TIMES DAILY WITH MEALS      True Metrix Blood Glucose Test test strip USE 1 EACH 3 TIMES A DAY AS DIRECTED 300 strip 1    D3-50 1.25 MG (61755 UT) capsule TAKE 1 CAPSULE BY MOUTH ONE TIME PER WEEK (Patient taking differently: sunday) 12 capsule 2     No current facility-administered medications for this visit. Social History:  Social History     Socioeconomic History    Marital status: /Civil Union     Spouse name: Not on file    Number of children: Not on file    Years of education: Not on file    Highest education level: Not asked   Occupational History    Occupation: disabled   Tobacco Use    Smoking status: Never    Smokeless tobacco: Never   Vaping Use    Vaping Use: Never used   Substance and Sexual Activity    Alcohol use: Never    Drug use: No    Sexual activity: Not on file     Comment: defer   Other Topics Concern    Not on file   Social History Narrative    Daily caffeine consumption 2-3 servings a day     Social Determinants of Health     Financial Resource Strain: Unknown (7/13/2023)    Overall Financial Resource Strain (CARDIA)     Difficulty of Paying Living Expenses: Patient refused   Food Insecurity: No Food Insecurity (2/1/2023)    Hunger Vital Sign     Worried About Running Out of Food in the Last Year: Never true     Ran Out of Food in the Last Year: Never true   Transportation Needs: No Transportation Needs (7/13/2023)    PRAPARE - Transportation     Lack of Transportation (Medical): No     Lack of Transportation (Non-Medical):  No   Physical Activity: Not on file   Stress: No Stress Concern Present (1/18/2019)    [address] Questionnaire     Feeling of Stress : Only a little   Social Connections: Unknown (1/18/2019)    Social Connection and Isolation Panel [NHANES]     Frequency of Communication with Friends and Family: Not on file     Frequency of Social Gatherings with Friends and Family: Not on file     Attends Jehovah's witness Services: Not on file     Active Member of Clubs or Organizations: Not on file     Attends Club or Organization Meetings: Not on file     Marital Status:    Intimate Partner Violence: Not At Risk (1/18/2019)    Humiliation, Afraid, Rape, and Kick questionnaire     Fear of Current or Ex-Partner: No     Emotionally Abused: No     Physically Abused: No     Sexually Abused: No   Housing Stability: [address]  (2/1/2023)    Housing Stability Vital Sign     Unable to Pay for Housing in the Last Year: No     Number of [address] in the Last Year: 1     Unstable Housing in the Last Year: No        Review of Systems   Constitutional:  Negative for chills and fever. Respiratory:  Negative for cough and shortness of breath. Cardiovascular:  Negative for chest pain. Gastrointestinal:  Negative for constipation, diarrhea, nausea and vomiting. Skin:  Positive for wound. Neurological:  Positive for numbness. Objective:      Ht 5' 9" (1.753 m)   Wt 95.3 kg (210 lb)   BMI 31.01 kg/m²          Physical Exam  Vitals reviewed. Constitutional:       General: He is not in acute distress. Appearance: He is well-developed. He is not toxic-appearing or diaphoretic. Cardiovascular:      Rate and Rhythm: Normal rate and regular rhythm. Pulses:           Dorsalis pedis pulses are 1+ on the right side and 0 on the left side. Posterior tibial pulses are 0 on the right side and 0 on the left side. Pulmonary:      Effort: Pulmonary effort is normal. No respiratory distress. Musculoskeletal:         General: Deformity present. No tenderness or signs of injury. Right foot: No foot drop. Left foot: No foot drop. Comments: Hammertoe deformity noted. 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. Feet:      Right foot:      Skin integrity: Dry skin present. No ulcer, skin breakdown, erythema, warmth or callus. Left foot:      Skin integrity: Dry skin present. No ulcer, skin breakdown, erythema, warmth or callus. Skin:     General: Skin is warm. Capillary Refill: Capillary refill takes less than 2 seconds. Coloration: Skin is not cyanotic or mottled. Findings: No ecchymosis. Nails: There is no clubbing. Comments: Fibrous wound presents left heel. Wound does not probe to bone. No significant improvement or worsening. No signs of infection. Minimal drainage. Neurological:      General: No focal deficit present. Mental Status: He is alert and oriented to person, place, and time. Cranial Nerves: No cranial nerve deficit. Sensory: Sensory deficit present. Motor: No weakness. Psychiatric:         Mood and Affect: Mood normal.         Behavior: Behavior normal.         Thought Content:  Thought content normal.         Judgment: Judgment normal.

## 2023-11-07 NOTE — TELEPHONE ENCOUNTER
Caller: Patient's wife    Doctor/Office: POD    Call regarding :  Call-back from clinical     Call was transferred to: POD

## 2023-11-07 NOTE — TELEPHONE ENCOUNTER
Caller: Eliana    Doctor: Eliecer Petersen     Reason for call: Patients wife called stating her  is in a lot of pain and wanted to know if he could be seen today.   Please advise thank you      Call back#: [phone]

## 2023-11-08 ENCOUNTER — OFFICE VISIT (OUTPATIENT)
Dept: VASCULAR SURGERY | Facility: CLINIC | Age: 63
End: 2023-11-08
Payer: MEDICARE

## 2023-11-08 ENCOUNTER — HOSPITAL ENCOUNTER (OUTPATIENT)
Dept: RADIOLOGY | Facility: HOSPITAL | Age: 63
Discharge: HOME | End: 2023-11-08
Payer: MEDICARE

## 2023-11-08 VITALS
HEIGHT: 69 IN | SYSTOLIC BLOOD PRESSURE: 112 MMHG | WEIGHT: 211 LBS | HEART RATE: 67 BPM | DIASTOLIC BLOOD PRESSURE: 62 MMHG | BODY MASS INDEX: 31.25 KG/M2

## 2023-11-08 DIAGNOSIS — L97.421 ULCER OF LEFT HEEL, LIMITED TO BREAKDOWN OF SKIN (HCC): Primary | ICD-10-CM

## 2023-11-08 DIAGNOSIS — L97.421 ULCER OF LEFT HEEL, LIMITED TO BREAKDOWN OF SKIN (HCC): ICD-10-CM

## 2023-11-08 PROCEDURE — 73630 X-RAY EXAM OF FOOT: CPT

## 2023-11-08 PROCEDURE — 99213 OFFICE O/P EST LOW 20 MIN: CPT | Performed by: SURGERY

## 2023-11-08 RX ORDER — OXYCODONE HYDROCHLORIDE AND ACETAMINOPHEN 5; 325 MG/1; MG/1
1 TABLET ORAL EVERY 8 HOURS PRN
Qty: 20 TABLET | Refills: 0 | Status: SHIPPED | OUTPATIENT
Start: 2023-11-08 | End: 2023-11-18

## 2023-11-08 NOTE — ASSESSMENT & PLAN NOTE
Pt having worsening pain in left heel area. He is not using the offloading boot as suggested by Dr. Gerald Engle. He is scared and crying. We will get X ray of foot to make sure there is no underlying infection  I will write for percocet for pain relief. If there is underlying infection on xray we will need to get admission for iv antibiotics and need for repeat arterial angioplasty / atherectomy procedure. 10/11/23  Patient underwent left anterior tibial balloon angioplasty and left distal posterior tibial balloon angioplasty. The distal most portion of the posterior tibial artery was heavily calcified and stiff and could not allow passage of balloon catheter. Thus this segment remains untreated. He does have an improved posterior tibial artery signal on exam today. We will allow some more time for the wound to improve. At this point it is not ready for full debridement. I had a long conversation with the patient, his wife and his son who was present on phone. Due to underlying risk factors of end-stage renal disease and diabetes he is at risk of nonhealing of this wound. He will take longer than average time for this wound to heal.  I will recheck in 4 weeks. If not appropriate progression we may have to consider repeat intervention with possible atherectomy with micro crown or pedal loop angioplasty and retrograde access of the distal posterior tibial artery. Although these have higher risk of failure and worsening including distal embolization.

## 2023-11-08 NOTE — PATIENT INSTRUCTIONS
Pt having worsening pain in left heel area. Please use offloading boot as suggested by Dr. Dinesh Penaloza. We will get X ray of foot to make sure there is no underlying infection  I will write for percocet for pain relief. If there is underlying infection on xray we will need to get admission for iv antibiotics and need for repeat arterial angioplasty procedure.

## 2023-11-08 NOTE — PROGRESS NOTES
Assessment/Plan:    Ulcer of left heel, limited to breakdown of skin ([address])  Pt having worsening pain in left heel area. He is not using the offloading boot as suggested by Dr. Hayley Lincoln. He is scared and crying. We will get X ray of foot to make sure there is no underlying infection  I will write for percocet for pain relief. If there is underlying infection on xray we will need to get admission for iv antibiotics and need for repeat arterial angioplasty / atherectomy procedure. 10/11/23  Patient underwent left anterior tibial balloon angioplasty and left distal posterior tibial balloon angioplasty. The distal most portion of the posterior tibial artery was heavily calcified and stiff and could not allow passage of balloon catheter. Thus this segment remains untreated. He does have an improved posterior tibial artery signal on exam today. We will allow some more time for the wound to improve. At this point it is not ready for full debridement. I had a long conversation with the patient, his wife and his son who was present on phone. Due to underlying risk factors of end-stage renal disease and diabetes he is at risk of nonhealing of this wound. He will take longer than average time for this wound to heal.  I will recheck in 4 weeks. If not appropriate progression we may have to consider repeat intervention with possible atherectomy with micro crown or pedal loop angioplasty and retrograde access of the distal posterior tibial artery. Although these have higher risk of failure and worsening including distal embolization. Diagnoses and all orders for this visit:    Ulcer of left heel, limited to breakdown of skin (HCC)  -     XR foot 3+ vw left; Future  -     oxyCODONE-acetaminophen (Percocet) 5-325 mg per tablet; Take 1 tablet by mouth every 8 (eight) hours as needed for moderate pain for up to 10 days Max Daily Amount: 3 tablets          Subjective:      Patient ID: Mary Jackson is a 61 y.o. male.    Patient presents for wound recheck for non-healing L heel ulcer. HPI    The following portions of the patient's history were reviewed and updated as appropriate: allergies, current medications, past family history, past medical history, past social history, past surgical history, and problem list.    Review of Systems   Constitutional: Negative. HENT: Negative. Eyes: Negative. Respiratory: Negative. Cardiovascular: Negative. Gastrointestinal: Negative. Endocrine: Negative. Genitourinary: Negative. Musculoskeletal: Negative. Skin:  Positive for wound. Allergic/Immunologic: Negative. Neurological: Negative. Hematological: Negative. Psychiatric/Behavioral: Negative. I have reviewed the ROS as entered and made changes as necessary. Objective:      /62 (BP Location: Right arm, Patient Position: Sitting, Cuff Size: Standard)   Pulse 67   Ht 5' 9" (1.753 m)   Wt 95.7 kg (211 lb)   BMI 31.16 kg/m²          Physical Exam  Vitals and nursing note reviewed. Constitutional:       Appearance: Normal appearance. He is ill-appearing. Cardiovascular:      Rate and Rhythm: Normal rate and regular rhythm. Comments: Left lower extremity triphasic anterior tibial and dorsalis pedis signals. 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 Neurological:      Mental Status: He is alert. Psychiatric:         Mood and Affect: Affect is tearful.

## 2023-11-09 ENCOUNTER — OFFICE VISIT (OUTPATIENT)
Dept: CARDIOLOGY CLINIC | Facility: CLINIC | Age: 63
End: 2023-11-09
Payer: MEDICARE

## 2023-11-09 VITALS
HEART RATE: 60 BPM | DIASTOLIC BLOOD PRESSURE: 60 MMHG | SYSTOLIC BLOOD PRESSURE: 100 MMHG | WEIGHT: 213.5 LBS | OXYGEN SATURATION: 97 % | HEIGHT: 69 IN | BODY MASS INDEX: 31.62 KG/M2

## 2023-11-09 DIAGNOSIS — Z95.5 S/P CORONARY ARTERY STENT PLACEMENT: ICD-10-CM

## 2023-11-09 DIAGNOSIS — L97.421 ULCER OF LEFT HEEL, LIMITED TO BREAKDOWN OF SKIN (HCC): ICD-10-CM

## 2023-11-09 DIAGNOSIS — Z76.82 PRE-KIDNEY TRANSPLANT, LISTED: ICD-10-CM

## 2023-11-09 DIAGNOSIS — I25.10 CORONARY ARTERY DISEASE INVOLVING NATIVE CORONARY ARTERY OF NATIVE HEART WITHOUT ANGINA PECTORIS: Primary | ICD-10-CM

## 2023-11-09 DIAGNOSIS — I25.5 ISCHEMIC CARDIOMYOPATHY: ICD-10-CM

## 2023-11-09 PROCEDURE — 99214 OFFICE O/P EST MOD 30 MIN: CPT | Performed by: INTERNAL MEDICINE

## 2023-11-09 NOTE — PATIENT INSTRUCTIONS
You were seen today in the Cardiology office for follow up evaluation. Please continue your current cardiac medications as prescribed. Thank you for choosing [facility]. Please call our office or use [employer] with any questions.

## 2023-11-09 NOTE — PROGRESS NOTES
[facility]    Name:Asad Hernandez   DOS: 11/9/2023     Chief Complaint:   Chief Complaint   Patient presents with    Follow-up     No complaints. HISTORY OF PRESENT ILLNESS:    HPI:  Татьяна Varela is a 61 y.o. male. He  has a past medical history of Cerebrovascular accident (CVA) due to thrombosis of left middle cerebral artery ([address]) (07/29/2018), Chronic kidney disease, Coronary artery disease, Diabetes mellitus ([address]), Dialysis patient ([address]), Fistula, GERD (gastroesophageal reflux disease), History of coronary artery stent placement, Hypercholesteremia, Hyperlipidemia, Hypertension, Infectious viral hepatitis, Limb alert care status, Neuropathy, Obesity, Osteomyelitis ([address]), PVC's (premature ventricular contractions), Stroke ([address]), TIA (transient ischemic attack) (10/28/2018), Wears dentures, and Wears glasses. He presents for follow-up evaluation. Has no active cardiopulmonary complaints, and specifically denies chest pain, shortness of breath, diaphoresis, dizziness, palpitations, orthopnea, PND, edema, syncope. He has been tolerating dialysis well, with no chest pain or hypotension episodes during dialysis. Has been dealing with a left lower extremity foot wound that has been slow to heal.  Remains on the transplant list for kidney transplantation at multiple locations including [facility]. Has been recommended by his renal transplantation team to have a repeat transthoracic echo for updated ejection fraction. ROS    ROS: Pertinent positives and negatives as described in History of Present Illness. Remainder of a 14 point review of systems was negative.      No Known Allergies     Current Outpatient Medications on File Prior to Visit   Medication Sig Dispense Refill    aspirin (ECOTRIN LOW STRENGTH) 81 mg EC tablet Take 81 mg by mouth daily Resume on 8/14        atorvastatin (LIPITOR) 40 mg tablet Take 1 tablet (40 mg total) by mouth daily with dinner 90 tablet 1    D3-50 1.25 MG (84248 UT) capsule TAKE 1 CAPSULE BY MOUTH ONE TIME PER WEEK (Patient taking differently: sunday) 12 capsule 2    divalproex sodium (DEPAKOTE SPRINKLE) 125 MG capsule TAKE 2 CAPSULES (250 MG TOTAL) BY MOUTH EVERY 12 (TWELVE) HOURS 360 capsule 1    metoprolol succinate (TOPROL-XL) 50 mg 24 hr tablet Take 1 tablet (50 mg total) by mouth 2 (two) times a day 180 tablet 3    midodrine (PROAMATINE) 2.5 mg tablet Take 1 tablet (2.5 mg total) by mouth as needed (for SBP<90 on dialysis days) 30 tablet 0    prasugrel (EFFIENT) tablet Take 1 tablet (5 mg total) by mouth daily (Patient taking differently: Take 5 mg by mouth daily Takes with pudding) 90 tablet 3    sacubitril-valsartan (Entresto) 24-26 MG TABS Take 1 tablet by mouth in the morning Bedtime (Patient taking differently: Take 1 tablet by mouth daily at bedtime Bedtime) 30 tablet 11    Sevelamer Carbonate 2.4 g PACK VIGOROUSLY MIX CONTENTS OF 1 PACKET IN WATER (IT WILL NOT DISSOLVE) AND DRINK 3 TIMES DAILY WITH MEALS      True Metrix Blood Glucose Test test strip USE 1 EACH 3 TIMES A DAY AS DIRECTED 300 strip 1    Blood Glucose Monitoring Suppl (True Metrix Meter) w/Device KIT Use to test blood sugars 3 times daily 1 kit 0    Blood Pressure Monitoring (BLOOD PRESSURE CUFF) MISC Use to check blood pressure before taking blood pressure medication and 1 hour after and follow instructions provided in discharge instructions based on the readings.  1 each 0    collagenase (SANTYL) ointment Apply topically daily 90 g 0    fluticasone (FLONASE) 50 mcg/act nasal spray 1 spray into each nostril daily (Patient not taking: Reported on 11/9/2023) 9.9 mL 0    lidocaine-prilocaine (EMLA) cream Apply topically as needed for mild pain 30 g 3    ONETOUCH DELICA LANCETS 92A MISC by Does not apply route 3 (three) times a day 270 each 1    oxyCODONE-acetaminophen (Percocet) 5-325 mg per tablet Take 1 tablet by mouth every 8 (eight) hours as needed for moderate pain for up to 10 days Max Daily Amount: 3 tablets 20 tablet 0     No current facility-administered medications on file prior to visit. Past Medical History:   Diagnosis Date    Cerebrovascular accident (CVA) due to thrombosis of left middle cerebral artery ([address]) 07/29/2018    Chronic kidney disease     Coronary artery disease     Diabetes mellitus ([address])     not on meds    Dialysis patient [facility])     M-W-F    Fistula     left upper arm for hemodialyis    GERD (gastroesophageal reflux disease)     History of coronary artery stent placement     x3    Hypercholesteremia     Hyperlipidemia     Hypertension     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    Limb alert care status     no BP/IV left arm    Neuropathy     Obesity     Osteomyelitis ([address])     last assessed 11/4/16-per son not currently    PVC's (premature ventricular contractions)     sees [facility]    Stroke [facility])     last weeof July 2018 [facility] [employer]    TIA (transient ischemic attack) 10/28/2018    Wears dentures     Wears glasses        Past Surgical History:   Procedure Laterality Date    ABDOMINAL SURGERY      CARDIAC CATHETERIZATION N/A 05/02/2022    Procedure: Cardiac Coronary Angiogram;  Surgeon: Krystal Schaefer MD;  [facility]; Service: Cardiology    CARDIAC CATHETERIZATION N/A 05/02/2022    Procedure: Cardiac pci;  Surgeon: Krystal Schaefer MD;  [facility]; Service: Cardiology    CARDIAC CATHETERIZATION  02/01/2023    Procedure: Cardiac catheterization;  Surgeon: Krystal Schaefer MD;  [facility]; Service: Cardiology    CARDIAC CATHETERIZATION N/A 02/01/2023    Procedure: Cardiac pci;  Surgeon: Krystal Schaefer MD;  [facility]; Service: Cardiology    CARDIAC CATHETERIZATION N/A 02/01/2023    Procedure: Cardiac Coronary Angiogram;  Surgeon: Krystal Schaefer MD;  [facility];   Service: Cardiology    CARDIAC CATHETERIZATION N/A 02/01/2023 Procedure: Cardiac other-IVUS;  Surgeon: Emile Black MD;  [facility]: [facility]; Service: Cardiology    CHOLECYSTECTOMY      Percutaneous    COLONOSCOPY      CYSTOSCOPY      IR LOWER EXTREMITY ANGIOGRAM  9/29/2023    OTHER SURGICAL HISTORY      "stimulator to control bowel movements"    MD ESOPHAGOGASTRODUODENOSCOPY TRANSORAL DIAGNOSTIC N/A 09/27/2016    Procedure: ESOPHAGOGASTRODUODENOSCOPY (EGD); Surgeon: Tonya Shepprad MD;  Location: [facility] GI LAB;   Service: Gastroenterology    MD LAPAROSCOPY SURG CHOLECYSTECTOMY N/A 02/29/2016    Procedure: LAPAROSCOPIC CHOLECYSTECTOMY ;  Surgeon: Cm Gusman DO;  Location: [facility] Main OR;  Service: General    MD [facility] 3RD+ ORD SLCTV ABDL PEL/LXTR [facility] Left 9/29/2023    Procedure: Left leg angiogram with intervention;  Surgeon: Adamaris Bernal MD;  Location: [facility] MAIN [facility];  Service: Vascular    ROTATOR CUFF REPAIR Right     SPINAL CORD STIMULATOR IMPLANT      "Medtronic interstim model # 0946- in lower back to control bowel movements-currently turned off-battery is dead"    TOE AMPUTATION Right 10/28/2016    Procedure: 3RD TOE AMPUTATION ;  Surgeon: Valeria Gross DPM;  Location: [facility];  Service:        Family History   Problem Relation Age of Onset    Leukemia Mother     Liver disease Mother     Lung cancer Mother         heavy smoker - 3 ppd    Heart disease Father     Liver disease Father     Diabetes type I Father     Multiple myeloma Sister     Breast cancer Sister     Urolithiasis Family     Alcohol abuse Neg Hx     Depression Neg Hx     Drug abuse Neg Hx     Substance Abuse Neg Hx     Mental illness Neg Hx        Social History     Socioeconomic History    Marital status: /Civil Union     Spouse name: Not on file    Number of children: Not on file    Years of education: Not on file    Highest education level: Not asked   Occupational History    Occupation: disabled   Tobacco Use    Smoking status: Never    Smokeless tobacco: Never Vaping Use    Vaping Use: Never used   Substance and Sexual Activity    Alcohol use: Never    Drug use: No    Sexual activity: Not on file     Comment: defer   Other Topics Concern    Not on file   Social History Narrative    Daily caffeine consumption 2-3 servings a day     Social Determinants of Health     Financial Resource Strain: Unknown (7/13/2023)    Overall Financial Resource Strain (CARDIA)     Difficulty of Paying Living Expenses: Patient refused   Food Insecurity: No Food Insecurity (2/1/2023)    Hunger Vital Sign     Worried About Running Out of Food in the Last Year: Never true     Ran Out of Food in the Last Year: Never true   Transportation Needs: No Transportation Needs (7/13/2023)    PRAPARE - Transportation     Lack of Transportation (Medical): No     Lack of Transportation (Non-Medical): No   Physical Activity: Not on file   Stress: No Stress Concern Present (1/18/2019)    [facility]     Feeling of Stress :  Only a little   Social Connections: Unknown (1/18/2019)    Social Connection and Isolation Panel [NHANES]     Frequency of Communication with Friends and Family: Not on file     Frequency of Social Gatherings with Friends and Family: Not on file     Attends Islam Services: Not on file     Active Member of Clubs or Organizations: Not on file     Attends Club or Organization Meetings: Not on file     Marital Status:    Intimate Partner Violence: Not At Risk (1/18/2019)    Humiliation, Afraid, Rape, and Kick questionnaire     Fear of Current or Ex-Partner: No     Emotionally Abused: No     Physically Abused: No     Sexually Abused: No   Housing Stability: [address]  (2/1/2023)    Housing Stability Vital Sign     Unable to Pay for Housing in the Last Year: No     Number of [address] in the Last Year: 1     Unstable Housing in the Last Year: No       OBJECTIVE:    /60 (BP Location: Right arm, Patient Position: Sitting, Cuff Size: Standard)   Pulse 60   Ht 5' 9" (1.753 m)   Wt 96.8 kg (213 lb 8 oz)   SpO2 97%   BMI 31.53 kg/m²      BP Readings from Last 3 Encounters:   11/09/23 100/60   11/08/23 112/62   10/28/23 103/51       Wt Readings from Last 3 Encounters:   11/09/23 96.8 kg (213 lb 8 oz)   11/08/23 95.7 kg (211 lb)   11/07/23 95.3 kg (210 lb)         Physical Exam  Vitals reviewed. Constitutional:       General: He is not in acute distress. Appearance: Normal appearance. He is not diaphoretic. Comments: Objectively appears improved compared to prior assessments. HENT:      Head: Normocephalic and atraumatic. Eyes:      Conjunctiva/sclera: Conjunctivae normal.   Neck:      Vascular: No carotid bruit or JVD. Cardiovascular:      Rate and Rhythm: Normal rate and regular rhythm. Pulses: Normal pulses. Heart sounds: Normal heart sounds. No murmur heard. No friction rub. No gallop. Pulmonary:      Effort: Pulmonary effort is normal.      Breath sounds: Normal breath sounds. No wheezing, rhonchi or rales. Abdominal:      General: Abdomen is flat. Bowel sounds are normal. There is no distension. Palpations: Abdomen is soft. Musculoskeletal:      Right lower leg: No edema. Left lower leg: No edema. Skin:     General: Skin is warm and dry. Comments: LUE AV fistula   Neurological:      General: No focal deficit present. Mental Status: He is alert and oriented to person, place, and time. Psychiatric:         Mood and Affect: Mood normal.         Behavior: Behavior normal.                                                       Cardiac testing:   EKG reviewed personally as performed on 10/28/2023. My read: Normal sinus rhythm, right bundle branch block. Nonspecific T wave abnormality.     Results for orders placed during the hospital encounter of 10/05/20    Echo complete with contrast if indicated    Narrative  [address] [place] [address]  [phone]    Transthoracic Echocardiogram  2D, M-mode, Doppler, and Color Doppler    Study date:  06-Oct-2020    Patient: Whitney Buitrago  MR number: [id number]  Account number: [de-identified]  : 1960  Age: 61 years  Gender: Male  Status: Inpatient  Location: Bedside  Height: 70 in  Weight: 239.6 lb  BP: 165/ 80 mmHg    Indications: Shortness of breath. Diagnoses: R06.02 - Shortness of breath    Sonographer:  Arpit Rothman RDCS  Primary Physician:  Denisse Tse DO  Referring Physician:  Ferdinand Johnson MD  Group:  [facility]  Interpreting Physician:  North Yepez MD    SUMMARY    LEFT VENTRICLE:  Systolic function was normal by visual assessment. Ejection fraction was estimated to be 60 %. There were no regional wall motion abnormalities. Wall thickness was moderately increased. Features were consistent with a pseudonormal left ventricular filling pattern, with concomitant abnormal relaxation and increased filling pressure (grade 2 diastolic dysfunction). LEFT ATRIUM:  The atrium was mildly dilated. RIGHT ATRIUM:  The atrium was mildly dilated. MITRAL VALVE:  There was moderate annular calcification. There was mild regurgitation. AORTIC VALVE:  The valve was trileaflet. Leaflets exhibited normal thickness, moderate calcification, and moderately reduced cuspal separation. Transaortic velocity was increased due to valvular stenosis. There was mild to moderate stenosis. There was mild regurgitation. TRICUSPID VALVE:  There was trace regurgitation. PULMONIC VALVE:  There was mild regurgitation. HISTORY: PRIOR HISTORY: DM2. CKD4. Hypertension. CVA. GERD. Dementia. PROCEDURE: The procedure was performed at the bedside. This was a routine study. The transthoracic approach was used. The study included complete 2D imaging, M-mode, complete spectral Doppler, and color Doppler. The heart rate was 98 bpm,  at the start of the study.  Image quality was adequate. LEFT VENTRICLE: Size was normal. Systolic function was normal by visual assessment. Ejection fraction was estimated to be 60 %. There were no regional wall motion abnormalities. Wall thickness was moderately increased. DOPPLER: The ratio  of early ventricular filling to atrial contraction velocities was within the normal range. Features were consistent with a pseudonormal left ventricular filling pattern, with concomitant abnormal relaxation and increased filling pressure  (grade 2 diastolic dysfunction). RIGHT VENTRICLE: The size was normal. Systolic function was normal. DOPPLER: Systolic pressure was not estimated. LEFT ATRIUM: The atrium was mildly dilated. RIGHT ATRIUM: The atrium was mildly dilated. MITRAL VALVE: There was moderate annular calcification. Valve structure was normal. There was normal leaflet separation. DOPPLER: The transmitral velocity was within the normal range. There was no evidence for stenosis. There was mild  regurgitation. AORTIC VALVE: The valve was trileaflet. Leaflets exhibited normal thickness, moderate calcification, and moderately reduced cuspal separation. DOPPLER: Transaortic velocity was increased due to valvular stenosis. There was mild to moderate  stenosis. There was mild regurgitation. TRICUSPID VALVE: The valve structure was normal. There was normal leaflet separation. DOPPLER: The transtricuspid velocity was within the normal range. There was no evidence for stenosis. There was trace regurgitation. PULMONIC VALVE: Leaflets exhibited normal thickness, no calcification, and normal cuspal separation. DOPPLER: The transpulmonic velocity was within the normal range. There was mild regurgitation. PERICARDIUM: There was no pericardial effusion. AORTA: The root exhibited normal size. SYSTEMIC VEINS: IVC: The inferior vena cava was normal in size and course.  Respirophasic changes were normal.    SYSTEM MEASUREMENT TABLES    2D  %FS: 36.09 %  Ao Diam: 3.8 cm  EDV(Teich): 182.77 ml  EF(Teich): 64.8 %  ESV(Teich): 64.33 ml  IVSd: 1.57 cm  LA Area: 24.31 cm2  LA Diam: 4.63 cm  LVEDV MOD A4C: 192.34 ml  LVEF MOD A4C: 65.32 %  LVESV MOD A4C: 66.7 ml  LVIDd: 6.04 cm  LVIDs: 3.86 cm  LVLd A4C: 9.02 cm  LVLs A4C: 7.15 cm  LVOT Diam: 2.21 cm  LVPWd: 1.61 cm  RA Area: 17.56 cm2  RVIDd: 4 cm  SV MOD A4C: 125.64 ml  SV(Teich): 118.44 ml    CW  AV Env. Ti: 330.16 ms  AV MaxP.64 mmHg  AV VTI: 57.9 cm  AV Vmax: 2.58 m/s  AV Vmean: 1.76 m/s  AV meanP.38 mmHg  TR MaxP.37 mmHg  TR Vmax: 2.71 m/s    MM  TAPSE: 1.84 cm    PW  FATUMA (VTI): 1.27 cm2  FATUMA Vmax: 1.42 cm2  AVAI (VTI): 0 cm2/m2  AVAI Vmax: 0 cm2/m2  E' Sept: 0.07 m/s  E/E' Sept: 12.69  LVOT Env. Ti: 335.87 ms  LVOT VTI: 19.11 cm  LVOT Vmax: 0.95 m/s  LVOT Vmean: 0.57 m/s  LVOT maxPG: 3.63 mmHg  LVOT meanP.61 mmHg  LVSI Dopp: 32.42 ml/m2  LVSV Dopp: 73.27 ml  MV A [place]: 0.64 m/s  MV Dec [place]: 4.7 m/s2  MV DecT: 189.67 ms  MV E [place]: 0.89 m/s  MV E/A Ratio: 1.38  MV PHT: 55.01 ms  MVA By PHT: 4 cm2    [facility] Accredited Echocardiography Laboratory    Prepared and electronically signed by    Thalia Colin MD  Signed 06-Oct-2020 16:31:49      LABS:  Lab Results   Component Value Date    GLUCOSE 92 2023    BUN 33 (H) 10/28/2023    CREATININE 5.42 (H) 10/28/2023    CALCIUM 8.5 10/28/2023     08/10/2016    K 4.1 10/28/2023    CO2 33 (H) 10/28/2023    CL 97 10/28/2023    ALKPHOS 100 10/28/2023    BILITOT 0.6 08/10/2016    PROT 6.7 08/10/2016    AST 5 (L) 10/28/2023    ALT 5 (L) 10/28/2023    ANIONGAP 9 2016        Lab Results   Component Value Date    WBC 4.51 10/28/2023    HGB 9.0 (L) 10/28/2023    HCT 28.9 (L) 10/28/2023     (H) 10/28/2023    PLT 98 (L) 10/28/2023       Lab Results   Component Value Date    CHOL 203 (H) 08/10/2016    HDL 30 (L) 2023    LDLCALC 21 2023    TRIG 123 2023       Lab Results   Component Value Date    HGBA1C 4.9 04/18/2023           ASSESSMENT/PLAN:  Diagnoses and all orders for this visit:    Coronary artery disease involving native coronary artery of native heart without angina pectoris    Ulcer of left heel, limited to breakdown of skin ([address])  -     Ambulatory referral to Cardiology    S/P coronary artery stent placement    Ischemic cardiomyopathy  -     Echo follow up/limited w/ contrast if indicated; Future    Pre-kidney transplant, listed  -     Echo follow up/limited w/ contrast if indicated; Future      54-year-old male presenting for follow-up evaluation. Doing well overall, and remains fully compliant with all medications including aspirin and prasugrel. I recommended continuing GDMT with once daily Entresto in addition to maximally tolerated Toprol-XL. We did discuss in office today the new dosing information available for Jardiance (empagliflozin) demonstrating safety in patients regardless of GFR including those on hemodialysis. As requested by the patient's transplant center, I have ordered a repeat transthoracic echocardiogram to reevaluate LV systolic function and wall motion. At the next office visit, we will discuss initiating Jardiance as an addition to his GDMT regimen. Meet Bains MD      Portions of the record may have been created with voice recognition software. Occasional wrong word or "sound alike" substitutions may have occurred due to the inherent limitations of voice recognition software. Please review the chart carefully and recognize, using context, where substitutions/typographical errors may have occurred.

## 2023-11-15 DIAGNOSIS — I25.5 ISCHEMIC CARDIOMYOPATHY: ICD-10-CM

## 2023-11-16 ENCOUNTER — OFFICE VISIT (OUTPATIENT)
Dept: WOUND CARE | Facility: HOSPITAL | Age: 63
End: 2023-11-16
Payer: MEDICARE

## 2023-11-16 VITALS
HEART RATE: 58 BPM | RESPIRATION RATE: 20 BRPM | BODY MASS INDEX: 30.78 KG/M2 | TEMPERATURE: 96.9 F | SYSTOLIC BLOOD PRESSURE: 143 MMHG | HEIGHT: 70 IN | WEIGHT: 215 LBS | DIASTOLIC BLOOD PRESSURE: 63 MMHG

## 2023-11-16 DIAGNOSIS — L97.422 ULCER OF LEFT HEEL, WITH FAT LAYER EXPOSED (HCC): Primary | ICD-10-CM

## 2023-11-16 DIAGNOSIS — L97.421 ULCER OF LEFT HEEL, LIMITED TO BREAKDOWN OF SKIN (HCC): ICD-10-CM

## 2023-11-16 DIAGNOSIS — E11.42 DIABETIC POLYNEUROPATHY ASSOCIATED WITH TYPE 2 DIABETES MELLITUS (HCC): ICD-10-CM

## 2023-11-16 DIAGNOSIS — E11.40 TYPE 2 DIABETES MELLITUS WITH DIABETIC NEUROPATHY, UNSPECIFIED WHETHER LONG TERM INSULIN USE (HCC): ICD-10-CM

## 2023-11-16 DIAGNOSIS — I73.9 PERIPHERAL ARTERIAL DISEASE (HCC): ICD-10-CM

## 2023-11-16 PROCEDURE — 11042 DBRDMT SUBQ TIS 1ST 20SQCM/<: CPT | Performed by: PODIATRIST

## 2023-11-16 PROCEDURE — 99213 OFFICE O/P EST LOW 20 MIN: CPT | Performed by: PODIATRIST

## 2023-11-16 PROCEDURE — 99214 OFFICE O/P EST MOD 30 MIN: CPT | Performed by: PODIATRIST

## 2023-11-16 RX ORDER — LIDOCAINE 40 MG/G
CREAM TOPICAL ONCE
Status: COMPLETED | OUTPATIENT
Start: 2023-11-16 | End: 2023-11-16

## 2023-11-16 RX ORDER — GABAPENTIN 100 MG/1
100 CAPSULE ORAL 2 TIMES DAILY
Qty: 60 CAPSULE | Refills: 0 | Status: SHIPPED | OUTPATIENT
Start: 2023-11-16

## 2023-11-16 RX ADMIN — LIDOCAINE: 40 CREAM TOPICAL at 09:01

## 2023-11-16 NOTE — PATIENT INSTRUCTIONS
Orders Placed This Encounter   Procedures    Wound cleansing and dressings     Wound location left heel   Change dressing daily  The dressing must stay dry and intact. You may shower with dressing protected by cast cover or bag. Or you may sponge bathe. Call wound center or home health nurse if dressing becomes wet. Cleanse the wound with normal saline or mild soap and water, pat dry. Apply Santyl nickel thick to wound bed. (Medihoney applied during visit today). Cover with gauze. Secure with roll gauze and tape. This was applied today at the [facility]. Dr. Taylor Claire will send a prescription to the Pharmacy for Gabapentin 100mg by mouth 2x per day. This should help with the pain in the wound/foot. Standing Status:   Future     Standing Expiration Date:   11/16/2024    Debridement     This order was created via procedure documentation    Wound off loading     Off-loading Instructions:    Keep weight and pressure off wound at all times. Float heel on a pillow when lying in bed or having your feet elevated in a chair. Wear off-loading device as directed by your physician (Globoped shoe). Put on immediately when rising in the morning and remove when going to bed. Avoid position directing pressure to Wound site.      Standing Status:   Future     Standing Expiration Date:   11/16/2024

## 2023-11-16 NOTE — PROGRESS NOTES
Patient ID: Arcelia Denise is a 61 y.o. male Date of Birth 1960     Chief Complaint  Chief Complaint   Patient presents with    New Patient Visit     Left heel wound       Allergies  Patient has no known allergies. Assessment:    No problem-specific Assessment & Plan notes found for this encounter. Diagnoses and all orders for this visit:    Ulcer of left heel, with fat layer exposed ([address])  -     Wound cleansing and dressings; Future  -     Debridement  -     gabapentin (Neurontin) 100 mg capsule; Take 1 capsule (100 mg total) by mouth 2 (two) times a day  -     Wound off loading; Future    Type 2 diabetes mellitus with diabetic neuropathy, unspecified whether long term insulin use (HCC)  -     Wound cleansing and dressings; Future  -     Debridement  -     gabapentin (Neurontin) 100 mg capsule; Take 1 capsule (100 mg total) by mouth 2 (two) times a day  -     Wound off loading; Future    Peripheral arterial disease (HCC)  -     Wound cleansing and dressings; Future  -     Wound off loading; Future    Ulcer of left heel, limited to breakdown of skin (HCC)  -     collagenase (SANTYL) ointment; Apply topically daily    Diabetic polyneuropathy associated with type 2 diabetes mellitus (HCC)  -     collagenase (SANTYL) ointment; Apply topically daily              Debridement   Wound 06/02/23 Pressure Injury Heel Left    Universal Protocol:  Consent: Verbal consent obtained. Risks and benefits: risks, benefits and alternatives were discussed  Consent given by: patient  Time out: Immediately prior to procedure a "time out" was called to verify the correct patient, procedure, equipment, support staff and site/side marked as required.   Timeout called at: 11/16/2023 8:45 AM.  Patient understanding: patient states understanding of the procedure being performed  Patient identity confirmed: verbally with patient    Performed by: physician  Debridement type: surgical  Level of debridement: subcutaneous tissue    Post-debridement measurements  Length (cm): 2.1  Width (cm): 2.1  Depth (cm): 0.3  Percent debrided: 100%  Surface Area (cm^2): 4.41  Area debrided (cm^2): 4.41  Volume (cm^3): 1.32  Tissue and other material debrided: dermis, epidermis and subcutaneous tissue  Devitalized tissue debrided: fibrin, necrotic debris and slough  Instrument(s) utilized: blade  Bleeding: small  Hemostasis obtained with: pressure  Procedural pain (0-10): 0  Post-procedural pain: 0   Response to treatment: procedure was tolerated well        Plan:  1. Reviewed medical records. Reviewed the note from vascular surgery. Patient was counseled on the condition and diagnosis. 2. He has wound without infection on left heel. Wound debrided. Eschar left intact. Continue Santyl. 3. Continue Globopedi shoe. Stressed on patient compliance about proper off-loading in bed with Prevalon boot. His ulcer would not heal without proper off-loading and patient compliance. 4. Rx: Gabapentin for pain. 5. RA in 2 weeks. Wound 06/02/23 Pressure Injury Heel Left (Active)   Enter Jovan Lindquist score: Jovan Lindquist Grade 1: Partial or full-thickness ulcer (superficial) 11/16/23 0819   Wound Image Images linked 11/16/23 0845   Wound Description Pink;Black;Eschar;Slough; Yellow 11/16/23 0819   Lani-wound Assessment Intact;Callus 11/16/23 0819   Wound Length (cm) 2 cm 11/16/23 0819   Wound Width (cm) 2.1 cm 11/16/23 0819   Wound Depth (cm) 0.3 cm 11/16/23 0819   Wound Surface Area (cm^2) 4.2 cm^2 11/16/23 0819   Wound Volume (cm^3) 1.26 cm^3 11/16/23 0819   Calculated Wound Volume (cm^3) 1.26 cm^3 11/16/23 0819   Drainage Amount Moderate 11/16/23 0819   Drainage Description Serosanguineous 11/16/23 0819   Non-staged Wound Description Full thickness 11/16/23 0819   Dressing Status Intact 11/16/23 0819       Wound 01/29/23 Abrasion(s) Pretibial Right (Active)   Date First Assessed/Time First Assessed: 01/29/23 1218   Pre-Existing Wound: Yes  Traumatic Wound Type: Abrasion(s)  Location: Pretibial  Wound Location Orientation: Right  Wound Description (Comments): Scabbed       Wound 02/01/23 Wrist Anterior;Right (Active)   Date First Assessed/Time First Assessed: 02/01/23 1806   Pre-Existing Wound: No  Location: Wrist  Wound Location Orientation: Anterior;Right       Wound 02/01/23 Skin Tear Abrasion(s) Arm Left;Posterior;Proximal (Active)   Date First Assessed/Time First Assessed: 02/01/23 1930   Pre-Existing Wound: Yes  Primary Wound Type: Skin Tear  Traumatic Wound Type: Abrasion(s)  Location: Arm  Wound Location Orientation: Left;Posterior;Proximal       Wound 06/02/23 Pressure Injury Heel Left (Active)   Date First Assessed/Time First Assessed: 06/02/23 1908   Primary Wound Type: Pressure Injury  Location: Heel  Wound Location Orientation: Left  Dressing Status: Clean;Dry; Intact       Wound 09/29/23 Groin Right (Active)   Date First Assessed/Time First Assessed: 09/29/23 0957   Location: Groin  Wound Location Orientation: Right  Wound Description (Comments): New Puncture site noted   Incision's 1st Dressing: ADHESIVE SKIN HIGH VISCOSITY EXOFIN 1ML (x1)       Subjective:          HPI  The patients presents for evaluation of left heel ulcer. Complained of pain in left heel. Oxycodone does not seem to help. BS under control. He is not wearing Prevalon boot in bed.        The following portions of the patient's history were reviewed and updated as appropriate: allergies, current medications, past family history, past medical history, past social history, past surgical history, and problem list.      PAST MEDICAL HISTORY:  Past Medical History:   Diagnosis Date    Cerebrovascular accident (CVA) due to thrombosis of left middle cerebral artery ([address]) 07/29/2018    Chronic kidney disease     Coronary artery disease     Diabetes mellitus ([address])     not on meds    Dialysis patient [facility])     M-W-F    Fistula     left upper arm for hemodialyis    GERD (gastroesophageal reflux disease)     History of coronary artery stent placement     x3    Hypercholesteremia     Hyperlipidemia     Hypertension     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    Limb alert care status     no BP/IV left arm    Neuropathy     Obesity     Osteomyelitis ([address])     last assessed 11/4/16-per son not currently    PVC's (premature ventricular contractions)     sees [facility]    Stroke [facility])     last weeof July 2018 [facility]    TIA (transient ischemic attack) 10/28/2018    Wears dentures     Wears glasses        PAST SURGICAL HISTORY:  Past Surgical History:   Procedure Laterality Date    ABDOMINAL SURGERY      CARDIAC CATHETERIZATION N/A 05/02/2022    Procedure: Cardiac Coronary Angiogram;  Surgeon: Faye Martinez MD;  [facility]; Service: Cardiology    CARDIAC CATHETERIZATION N/A 05/02/2022    Procedure: Cardiac pci;  Surgeon: Faye Martinez MD;  [facility]; Service: Cardiology    CARDIAC CATHETERIZATION  02/01/2023    Procedure: Cardiac catheterization;  Surgeon: Faye Martinez MD;  [facility]; Service: Cardiology    CARDIAC CATHETERIZATION N/A 02/01/2023    Procedure: Cardiac pci;  Surgeon: Faye Martinez MD;  [facility]; Service: Cardiology    CARDIAC CATHETERIZATION N/A 02/01/2023    Procedure: Cardiac Coronary Angiogram;  Surgeon: Faye Martinez MD;  [facility]; Service: Cardiology    CARDIAC CATHETERIZATION N/A 02/01/2023    Procedure: Cardiac other-IVUS;  Surgeon: Faye Martinez MD;  [facility]; Service: Cardiology    CHOLECYSTECTOMY      Percutaneous    COLONOSCOPY      CYSTOSCOPY      IR LOWER EXTREMITY ANGIOGRAM  9/29/2023    OTHER SURGICAL HISTORY      "stimulator to control bowel movements"    GA ESOPHAGOGASTRODUODENOSCOPY TRANSORAL DIAGNOSTIC N/A 09/27/2016    Procedure: ESOPHAGOGASTRODUODENOSCOPY (EGD); Surgeon: Miguelina Carrasco MD;  Location: [facility] GI LAB;   Service: Gastroenterology    PA LAPAROSCOPY SURG CHOLECYSTECTOMY N/A 02/29/2016    Procedure: LAPAROSCOPIC CHOLECYSTECTOMY ;  Surgeon: Daria Martínez DO;  Location: [facility] Main OR;  Service: General    PA Crescencio Raymond 3RD+ ORD [address] [facility] Left 9/29/2023    Procedure: Left leg angiogram with intervention;  Surgeon: Ary Keller MD;  Location: [facility] MAIN OR;  Service: Vascular    ROTATOR CUFF REPAIR Right     SPINAL CORD STIMULATOR IMPLANT      "Medtronic interstim model # 5379- in lower back to control bowel movements-currently turned off-battery is dead"    TOE AMPUTATION Right 10/28/2016    Procedure: 3RD TOE AMPUTATION ;  Surgeon: Mulu Alfred DPM;  Location: [facility] Main OR;  Service:         ALLERGIES:  Patient has no known allergies. MEDICATIONS:  Current Outpatient Medications   Medication Sig Dispense Refill    collagenase (SANTYL) ointment Apply topically daily 90 g 0    gabapentin (Neurontin) 100 mg capsule Take 1 capsule (100 mg total) by mouth 2 (two) times a day 60 capsule 0    aspirin (ECOTRIN LOW STRENGTH) 81 mg EC tablet Take 81 mg by mouth daily Resume on 8/14        atorvastatin (LIPITOR) 40 mg tablet Take 1 tablet (40 mg total) by mouth daily with dinner 90 tablet 1    Blood Glucose Monitoring Suppl (True Metrix Meter) w/Device KIT Use to test blood sugars 3 times daily 1 kit 0    Blood Pressure Monitoring (BLOOD PRESSURE CUFF) MISC Use to check blood pressure before taking blood pressure medication and 1 hour after and follow instructions provided in discharge instructions based on the readings.  1 each 0    D3-50 1.25 MG (27025 UT) capsule TAKE 1 CAPSULE BY MOUTH ONE TIME PER WEEK (Patient taking differently: sunday) 12 capsule 2    divalproex sodium (DEPAKOTE SPRINKLE) 125 MG capsule TAKE 2 CAPSULES (250 MG TOTAL) BY MOUTH EVERY 12 (TWELVE) HOURS 360 capsule 1    fluticasone (FLONASE) 50 mcg/act nasal spray 1 spray into each nostril daily (Patient not taking: Reported on 11/9/2023) 9.9 mL 0    lidocaine-prilocaine (EMLA) cream Apply topically as needed for mild pain 30 g 3    metoprolol succinate (TOPROL-XL) 50 mg 24 hr tablet Take 1 tablet (50 mg total) by mouth 2 (two) times a day 180 tablet 3    midodrine (PROAMATINE) 2.5 mg tablet Take 1 tablet (2.5 mg total) by mouth as needed (for SBP<90 on dialysis days) 30 tablet 0    ONETOUCH DELICA LANCETS 69Y MISC by Does not apply route 3 (three) times a day 270 each 1    oxyCODONE-acetaminophen (Percocet) 5-325 mg per tablet Take 1 tablet by mouth every 8 (eight) hours as needed for moderate pain for up to 10 days Max Daily Amount: 3 tablets 20 tablet 0    prasugrel (EFFIENT) tablet Take 1 tablet (5 mg total) by mouth daily (Patient taking differently: Take 5 mg by mouth daily Takes with pudding) 90 tablet 3    sacubitril-valsartan (Entresto) 24-26 MG TABS Take 1 tablet by mouth in the morning Bedtime (Patient taking differently: Take 1 tablet by mouth daily at bedtime Bedtime) 30 tablet 11    Sevelamer Carbonate 2.4 g PACK VIGOROUSLY MIX CONTENTS OF 1 PACKET IN WATER (IT WILL NOT DISSOLVE) AND DRINK 3 TIMES DAILY WITH MEALS      True Metrix Blood Glucose Test test strip USE 1 EACH 3 TIMES A DAY AS DIRECTED 300 strip 1     No current facility-administered medications for this visit.        SOCIAL HISTORY:  Social History     Socioeconomic History    Marital status: /Civil Union     Spouse name: None    Number of children: None    Years of education: None    Highest education level: Not asked   Occupational History    Occupation: disabled   Tobacco Use    Smoking status: Never    Smokeless tobacco: Never   Vaping Use    Vaping Use: Never used   Substance and Sexual Activity    Alcohol use: Never    Drug use: No    Sexual activity: None     Comment: defer   Other Topics Concern    None   Social History Narrative    Daily caffeine consumption 2-3 servings a day     Social Determinants of Health     Financial Resource Strain: Unknown (7/13/2023) Overall Financial Resource Strain (CARDIA)     Difficulty of Paying Living Expenses: Patient refused   Food Insecurity: No Food Insecurity (2/1/2023)    Hunger Vital Sign     Worried About Running Out of Food in the Last Year: Never true     Ran Out of Food in the Last Year: Never true   Transportation Needs: No Transportation Needs (7/13/2023)    PRAPARE - Transportation     Lack of Transportation (Medical): No     Lack of Transportation (Non-Medical): No   Physical Activity: Not on file   Stress: No Stress Concern Present (1/18/2019)    [address] [facility]     Feeling of Stress : Only a little   Social Connections: Unknown (1/18/2019)    Social Connection and Isolation Panel [NHANES]     Frequency of Communication with Friends and Family: Not on file     Frequency of Social Gatherings with Friends and Family: Not on file     Attends Sabianism Services: Not on file     Active Member of Clubs or Organizations: Not on file     Attends Club or Organization Meetings: Not on file     Marital Status:    Intimate Partner Violence: Not At Risk (1/18/2019)    Humiliation, Afraid, Rape, and Kick questionnaire     Fear of Current or Ex-Partner: No     Emotionally Abused: No     Physically Abused: No     Sexually Abused: No   Housing Stability: [address]  (2/1/2023)    Housing Stability Vital Sign     Unable to Pay for Housing in the Last Year: No     Number of [address] in the Last Year: 1     Unstable Housing in the Last Year: No        Review of Systems   Constitutional:  Negative for chills and fever. Respiratory:  Negative for cough and shortness of breath. Cardiovascular:  Negative for chest pain. Gastrointestinal:  Negative for nausea and vomiting. Skin:  Positive for wound. Neurological:  Positive for numbness. Negative for weakness.          Objective:       Wound 06/02/23 Pressure Injury Heel Left (Active)   Enter Nacho Ladarius score: Nacho Ladarius Grade 1: Partial or full-thickness ulcer (superficial) 11/16/23 0819   Wound Image Images linked 11/16/23 0845   Wound Description Pink;Black;Eschar;Slough; Yellow 11/16/23 0819   Lani-wound Assessment Intact;Callus 11/16/23 0819   Wound Length (cm) 2 cm 11/16/23 0819   Wound Width (cm) 2.1 cm 11/16/23 0819   Wound Depth (cm) 0.3 cm 11/16/23 0819   Wound Surface Area (cm^2) 4.2 cm^2 11/16/23 0819   Wound Volume (cm^3) 1.26 cm^3 11/16/23 0819   Calculated Wound Volume (cm^3) 1.26 cm^3 11/16/23 0819   Drainage Amount Moderate 11/16/23 0819   Drainage Description Serosanguineous 11/16/23 0819   Non-staged Wound Description Full thickness 11/16/23 0819   Dressing Status Intact 11/16/23 0819       /63   Pulse 58   Temp (!) 96.9 °F (36.1 °C)   Resp 20   Ht 5' 10" (1.778 m)   Wt 97.5 kg (215 lb)   BMI 30.85 kg/m²     Physical Exam  Vitals and nursing note reviewed. Constitutional:       General: He is not in acute distress. Appearance: He is not toxic-appearing or diaphoretic. Cardiovascular:      Rate and Rhythm: Normal rate and regular rhythm. Pulses:           Dorsalis pedis pulses are 1+ on the left side. Posterior tibial pulses are detected w/ Doppler on the left side. Pulmonary:      Effort: Pulmonary effort is normal. No respiratory distress. Musculoskeletal:         General: No signs of injury. Right foot: No foot drop. Left foot: No foot drop. Feet:      Comments: Biphasic PTA left. Skin:     General: Skin is warm. Capillary Refill: Capillary refill takes less than 2 seconds. Coloration: Skin is not cyanotic or mottled. Findings: No abscess or erythema. Nails: There is no clubbing. Comments: Ulcer noted left heel. Wound bed is dry. It is mixed with eschar and fibrous tissues. Good bleeding upon debridement. No signs of infection. No purulence or deep probing. See wound assessment and photo.    Neurological:      General: No focal deficit present. Mental Status: He is alert and oriented to person, place, and time. Cranial Nerves: No cranial nerve deficit. Sensory: No sensory deficit. Motor: No weakness. Coordination: Coordination normal.   Psychiatric:         Mood and Affect: Mood normal.         Behavior: Behavior normal.         Thought Content: Thought content normal.         Judgment: Judgment normal.           Wound Instructions:  Orders Placed This Encounter   Procedures    Wound cleansing and dressings     Wound location left heel   Change dressing daily  The dressing must stay dry and intact. You may shower with dressing protected by cast cover or bag. Or you may sponge bathe. Call wound center or home health nurse if dressing becomes wet. Cleanse the wound with normal saline or mild soap and water, pat dry. Apply Santyl nickel thick to wound bed. (Medihoney applied during visit today). Cover with gauze. Secure with roll gauze and tape. This was applied today at the [facility]. Dr. Eliecer Petersen will send a prescription to the Pharmacy for Gabapentin 100mg by mouth 2x per day. This should help with the pain in the wound/foot. Standing Status:   Future     Standing Expiration Date:   11/16/2024    Debridement     This order was created via procedure documentation    Wound off loading     Off-loading Instructions:    Keep weight and pressure off wound at all times. Float heel on a pillow when lying in bed or having your feet elevated in a chair. Wear off-loading device as directed by your physician (Globoped shoe). Put on immediately when rising in the morning and remove when going to bed. Avoid position directing pressure to Wound site. Standing Status:   Future     Standing Expiration Date:   11/16/2024        Diagnosis ICD-10-CM Associated Orders   1.  Ulcer of left heel, with fat layer exposed ([address])  L97.422 Wound cleansing and dressings     Debridement     gabapentin (Neurontin) 100 mg capsule Wound off loading      2. Type 2 diabetes mellitus with diabetic neuropathy, unspecified whether long term insulin use ([facility])  E11.40 Wound cleansing and dressings     Debridement     gabapentin (Neurontin) 100 mg capsule     Wound off loading      3. Peripheral arterial disease ([facility])  I73.9 Wound cleansing and dressings     Wound off loading      4. Ulcer of left heel, limited to breakdown of skin ([facility])  L97.421 collagenase (SANTYL) ointment      5.  Diabetic polyneuropathy associated with type 2 diabetes mellitus ([facility])  E11.42 collagenase (SANTYL) ointment

## 2023-11-19 ENCOUNTER — HOSPITAL ENCOUNTER (EMERGENCY)
Facility: HOSPITAL | Age: 63
Discharge: HOME | End: 2023-11-19
Attending: EMERGENCY MEDICINE
Payer: MEDICARE

## 2023-11-19 VITALS
WEIGHT: 208 LBS | OXYGEN SATURATION: 98 % | DIASTOLIC BLOOD PRESSURE: 79 MMHG | BODY MASS INDEX: 29.84 KG/M2 | RESPIRATION RATE: 18 BRPM | TEMPERATURE: 97.2 F | HEART RATE: 64 BPM | SYSTOLIC BLOOD PRESSURE: 127 MMHG

## 2023-11-19 DIAGNOSIS — K62.5 RECTAL BLEEDING: Primary | ICD-10-CM

## 2023-11-19 DIAGNOSIS — E55.9 VITAMIN D DEFICIENCY: ICD-10-CM

## 2023-11-19 LAB
ALBUMIN SERPL BCP-MCNC: 4.2 G/DL (ref 3.5–5)
ALP SERPL-CCNC: 92 U/L (ref 34–104)
ALT SERPL W P-5'-P-CCNC: 6 U/L (ref 7–52)
ANION GAP SERPL CALCULATED.3IONS-SCNC: 10 MMOL/L
AST SERPL W P-5'-P-CCNC: 7 U/L (ref 13–39)
BASOPHILS # BLD AUTO: 0.02 THOUSANDS/ÂΜL (ref 0–0.1)
BASOPHILS NFR BLD AUTO: 0 % (ref 0–1)
BILIRUB SERPL-MCNC: 0.73 MG/DL (ref 0.2–1)
BUN SERPL-MCNC: 54 MG/DL (ref 5–25)
CALCIUM SERPL-MCNC: 9.1 MG/DL (ref 8.4–10.2)
CHLORIDE SERPL-SCNC: 97 MMOL/L (ref 96–108)
CO2 SERPL-SCNC: 32 MMOL/L (ref 21–32)
CREAT SERPL-MCNC: 7.94 MG/DL (ref 0.6–1.3)
EOSINOPHIL # BLD AUTO: 0.1 THOUSAND/ÂΜL (ref 0–0.61)
EOSINOPHIL NFR BLD AUTO: 2 % (ref 0–6)
ERYTHROCYTE [DISTWIDTH] IN BLOOD BY AUTOMATED COUNT: 15.2 % (ref 11.6–15.1)
GFR SERPL CREATININE-BSD FRML MDRD: 6 ML/MIN/1.73SQ M
GLUCOSE SERPL-MCNC: 120 MG/DL (ref 65–140)
HCT VFR BLD AUTO: 33.8 % (ref 36.5–49.3)
HGB BLD-MCNC: 10.5 G/DL (ref 12–17)
IMM GRANULOCYTES # BLD AUTO: 0.02 THOUSAND/UL (ref 0–0.2)
IMM GRANULOCYTES NFR BLD AUTO: 0 % (ref 0–2)
INR PPP: 1.14 (ref 0.84–1.19)
LYMPHOCYTES # BLD AUTO: 0.76 THOUSANDS/ÂΜL (ref 0.6–4.47)
LYMPHOCYTES NFR BLD AUTO: 15 % (ref 14–44)
MCH RBC QN AUTO: 32.5 PG (ref 26.8–34.3)
MCHC RBC AUTO-ENTMCNC: 31.1 G/DL (ref 31.4–37.4)
MCV RBC AUTO: 105 FL (ref 82–98)
MONOCYTES # BLD AUTO: 0.4 THOUSAND/ÂΜL (ref 0.17–1.22)
MONOCYTES NFR BLD AUTO: 8 % (ref 4–12)
NEUTROPHILS # BLD AUTO: 3.64 THOUSANDS/ÂΜL (ref 1.85–7.62)
NEUTS SEG NFR BLD AUTO: 75 % (ref 43–75)
NRBC BLD AUTO-RTO: 0 /100 WBCS
PLATELET # BLD AUTO: 91 THOUSANDS/UL (ref 149–390)
PMV BLD AUTO: 12.1 FL (ref 8.9–12.7)
POTASSIUM SERPL-SCNC: 5.2 MMOL/L (ref 3.5–5.3)
PROT SERPL-MCNC: 7.4 G/DL (ref 6.4–8.4)
PROTHROMBIN TIME: 14.5 SECONDS (ref 11.6–14.5)
RBC # BLD AUTO: 3.23 MILLION/UL (ref 3.88–5.62)
SODIUM SERPL-SCNC: 139 MMOL/L (ref 135–147)
WBC # BLD AUTO: 4.94 THOUSAND/UL (ref 4.31–10.16)

## 2023-11-19 PROCEDURE — 99284 EMERGENCY DEPT VISIT MOD MDM: CPT

## 2023-11-19 PROCEDURE — 85025 COMPLETE CBC W/AUTO DIFF WBC: CPT

## 2023-11-19 PROCEDURE — 85610 PROTHROMBIN TIME: CPT

## 2023-11-19 PROCEDURE — 36415 COLL VENOUS BLD VENIPUNCTURE: CPT

## 2023-11-19 PROCEDURE — 80053 COMPREHEN METABOLIC PANEL: CPT

## 2023-11-19 PROCEDURE — 99284 EMERGENCY DEPT VISIT MOD MDM: CPT | Performed by: EMERGENCY MEDICINE

## 2023-11-19 NOTE — DISCHARGE INSTRUCTIONS
Please monitor symptoms very closely, if there is a lots of blood coming out, especially if it is purple in color, or bright red and very high volumes please come immediately back to the emergency department in addition if he is having any additional symptoms such as lightheadedness, fever, weakness, anything like that please come back to the ED immediately.

## 2023-11-19 NOTE — ED ATTENDING ATTESTATION
11/19/2023  ILaura MD, saw and evaluated the patient. I have discussed the patient with the resident/non-physician practitioner and agree with the resident's/non-physician practitioner's findings, Plan of Care, and MDM as documented in the resident's/non-physician practitioner's note, except where noted. All available labs and Radiology studies were reviewed. I was present for key portions of any procedure(s) performed by the resident/non-physician practitioner and I was immediately available to provide assistance. At this point I agree with the current assessment done in the Emergency Department.   I have conducted an independent evaluation of this patient a history and physical is as follows:  Small streaks of red blood on his diper  on 2 occassions   Since yesterday  no melena no fever   no abd pain   ESRD  takes 81 asa   No vomiting   Non toxic  vss    conj pink   anicteric    Lungs clear heart rrr abd soft pos bs  nt nd   No sacral decubiti  no fissure no ext hemmorid   HEME neg stool   ED Course         Critical Care Time  Procedures

## 2023-11-20 ENCOUNTER — VBI (OUTPATIENT)
Dept: INTERNAL MEDICINE CLINIC | Facility: CLINIC | Age: 63
End: 2023-11-20

## 2023-11-20 NOTE — TELEPHONE ENCOUNTER
11/20/23 1:11 PM    Patient contacted post ED visit, [facility] department spoke with patient/caregiver and outreach was successful. Thank you.   Val Gonzalez  [facility]

## 2023-11-21 RX ORDER — SACUBITRIL AND VALSARTAN 24; 26 MG/1; MG/1
1 TABLET, FILM COATED ORAL DAILY
Qty: 90 TABLET | Refills: 3 | Status: SHIPPED | OUTPATIENT
Start: 2023-11-21

## 2023-11-21 RX ORDER — METHOCARBAMOL 750 MG/1
TABLET ORAL
Qty: 12 CAPSULE | Refills: 2 | Status: SHIPPED | OUTPATIENT
Start: 2023-11-21

## 2023-11-21 NOTE — ED PROVIDER NOTES
History  Chief Complaint   Patient presents with    Rectal Bleeding     Patient reports rectal bleeding that started 2 days ago. The patient reports the blood is a dark red, denies any abdominal or back pain. 24-year-old male with history of ESRD on dialysis, CAD, diabetes, hyperlipidemia, hypertension is presenting after having 2 separate incidents of bright red blood in his bowel movements over the last 2 days. This has occurred on 2 separate occasions. He denies any prior history of similar symptoms. He has not anticoagulated but is on aspirin. Otherwise patient states that he feels like his normal baseline self. Wife is his primary caretaker and says that he has been acting normally and they are specifically denying any abdominal pain, back pain, abdominal distention, recent constipation or diarrhea. The blood is described as dark red, and scant in quantity. Prior to Admission Medications   Prescriptions Last Dose Informant Patient Reported? Taking? Blood Glucose Monitoring Suppl (True Metrix Meter) w/Device KIT  Child, Self No No   Sig: Use to test blood sugars 3 times daily   Blood Pressure Monitoring (BLOOD PRESSURE CUFF) MISC  Child, Self No No   Sig: Use to check blood pressure before taking blood pressure medication and 1 hour after and follow instructions provided in discharge instructions based on the readings.    ONETOUCH DELICA LANCETS 42J MISC  Child, Self No No   Sig: by Does not apply route 3 (three) times a day   Sevelamer Carbonate 2.4 g PACK  Child, Self Yes No   Sig: VIGOROUSLY MIX CONTENTS OF 1 PACKET IN WATER (IT WILL NOT DISSOLVE) AND DRINK 3 TIMES DAILY WITH MEALS   True Metrix Blood Glucose Test test strip  Self, Child No No   Sig: USE 1 EACH 3 TIMES A DAY AS DIRECTED   aspirin (ECOTRIN LOW STRENGTH) 81 mg EC tablet  Child, Self Yes No   Sig: Take 81 mg by mouth daily Resume on 8/14     atorvastatin (LIPITOR) 40 mg tablet  Child, Self No No   Sig: Take 1 tablet (40 mg total) by mouth daily with dinner   collagenase (SANTYL) ointment   No No   Sig: Apply topically daily   divalproex sodium (DEPAKOTE SPRINKLE) 125 MG capsule  Child, Self No No   Sig: TAKE 2 CAPSULES (250 MG TOTAL) BY MOUTH EVERY 12 (TWELVE) HOURS   fluticasone (FLONASE) 50 mcg/act nasal spray  Child, Self No No   Si spray into each nostril daily   Patient not taking: Reported on 2023   gabapentin (Neurontin) 100 mg capsule   No No   Sig: Take 1 capsule (100 mg total) by mouth 2 (two) times a day   lidocaine-prilocaine (EMLA) cream  Self, Child No No   Sig: Apply topically as needed for mild pain   metoprolol succinate (TOPROL-XL) 50 mg 24 hr tablet  Child, Self No No   Sig: Take 1 tablet (50 mg total) by mouth 2 (two) times a day   midodrine (PROAMATINE) 2.5 mg tablet  Child, Self No No   Sig: Take 1 tablet (2.5 mg total) by mouth as needed (for SBP<90 on dialysis days)   oxyCODONE-acetaminophen (Percocet) 5-325 mg per tablet  Child No No   Sig: Take 1 tablet by mouth every 8 (eight) hours as needed for moderate pain for up to 10 days Max Daily Amount: 3 tablets   prasugrel (EFFIENT) tablet  Child, Self No No   Sig: Take 1 tablet (5 mg total) by mouth daily   Patient taking differently: Take 5 mg by mouth daily Takes with pudding   sacubitril-valsartan (Entresto) 24-26 MG TABS  Child, Self No No   Sig: Take 1 tablet by mouth in the morning Bedtime   Patient taking differently: Take 1 tablet by mouth daily at bedtime Bedtime      Facility-Administered Medications: None       Past Medical History:   Diagnosis Date    Cerebrovascular accident (CVA) due to thrombosis of left middle cerebral artery ([address]) 2018    Chronic kidney disease     Coronary artery disease     Diabetes mellitus (HCC)     not on meds    Dialysis patient ([address])     M-W-F    Fistula     left upper arm for hemodialyis    GERD (gastroesophageal reflux disease)     History of coronary artery stent placement     x3    Hypercholesteremia Hyperlipidemia     Hypertension     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    Limb alert care status     no BP/IV left arm    Neuropathy     Obesity     Osteomyelitis ([address])     last assessed 11/4/16-per son not currently    PVC's (premature ventricular contractions)     sees [facility]    Stroke [facility])     last weeof July 2018 [facility]    TIA (transient ischemic attack) 10/28/2018    Wears dentures     Wears glasses        Past Surgical History:   Procedure Laterality Date    ABDOMINAL SURGERY      CARDIAC CATHETERIZATION N/A 05/02/2022    Procedure: Cardiac Coronary Angiogram;  Surgeon: Landon Arreguin MD;  [facility]; Service: Cardiology    CARDIAC CATHETERIZATION N/A 05/02/2022    Procedure: Cardiac pci;  Surgeon: Landon Arreguin MD;  [facility]; Service: Cardiology    CARDIAC CATHETERIZATION  02/01/2023    Procedure: Cardiac catheterization;  Surgeon: Landon Arreguin MD;  [facility]; Service: Cardiology    CARDIAC CATHETERIZATION N/A 02/01/2023    Procedure: Cardiac pci;  Surgeon: Landon Arreguin MD;  [facility]: [facility] CARD[facility]AC [facility]; Service: Cardiology    CARDIAC CATHETERIZATION N/A 02/01/2023    Procedure: Cardiac Coronary Angiogram;  Surgeon: Landon Arreguin MD;  [facility]: [facility] CARD[facility]; Service: Cardiology    CARDIAC CATHETERIZATION N/A 02/01/2023    Procedure: Cardiac other-IVUS;  Surgeon: Landon Arreguin MD;  [facility]: [facility] CARDIAC [facility]; Service: Cardiology    CHOLECYSTECTOMY      Percutaneous    COLONOSCOPY      CYSTOSCOPY      IR LOWER EXTREMITY ANGIOGRAM  9/29/2023    OTHER SURGICAL HISTORY      "stimulator to control bowel movements"    [place] ESOPHAGOGASTRODUODENOSCOPY TRANSORAL DIAGNOSTIC N/A 09/27/2016    Procedure: ESOPHAGOGASTRODUODENOSCOPY (EGD); Surgeon: Genetta Klinefelter, MD;  Location: [facility] GI LAB;   Service: Gastroenterology    [place] LAPAROSCOPY SURG CHOLECYSTECTOMY N/A 02/29/2016    Procedure: LAPAROSCOPIC CHOLECYSTECTOMY ;  Surgeon: Brendan Sexton DO;  Location: [facility] Main OR;  Service: General    MI Sudha Ramos 3RD+ ORD SLCTV ABDL PEL/LXTR [facility] Left 9/29/2023    Procedure: Left leg angiogram with intervention;  Surgeon: Hiral Hamilton MD;  Location: [facility] MAIN OR;  Service: Vascular    ROTATOR CUFF REPAIR Right     SPINAL CORD STIMULATOR IMPLANT      "Medtronic interstim model # 4879- in lower back to control bowel movements-currently turned off-battery is dead"    TOE AMPUTATION Right 10/28/2016    Procedure: 3RD TOE AMPUTATION ;  Surgeon: Berenice Cano DPM;  Location: [facility] Main OR;  Service:        Family History   Problem Relation Age of Onset    Leukemia Mother     Liver disease Mother     Lung cancer Mother         heavy smoker - 3 ppd    Heart disease Father     Liver disease Father     Diabetes type I Father     Multiple myeloma Sister     Breast cancer Sister     Urolithiasis Family     Alcohol abuse Neg Hx     Depression Neg Hx     Drug abuse Neg Hx     Substance Abuse Neg Hx     Mental illness Neg Hx      I have reviewed and agree with the history as documented. E-Cigarette/Vaping    E-Cigarette Use Never User      E-Cigarette/Vaping Substances    Nicotine No     THC No     CBD No     Flavoring No     Other No     Unknown No      Social History     Tobacco Use    Smoking status: Never    Smokeless tobacco: Never   Vaping Use    Vaping Use: Never used   Substance Use Topics    Alcohol use: Never    Drug use: No        Review of Systems   Constitutional:  Negative for chills, fatigue and fever. HENT:  Negative for congestion and sore throat. Eyes:  Negative for pain and visual disturbance. Respiratory:  Negative for cough, chest tightness and shortness of breath. Cardiovascular:  Negative for chest pain and palpitations. Gastrointestinal:  Positive for blood in stool. Negative for abdominal pain, constipation, diarrhea, nausea and vomiting. Genitourinary:  Negative for dysuria, flank pain and hematuria. Musculoskeletal:  Negative for arthralgias, back pain and neck pain. Skin:  Negative for color change and rash. Neurological:  Negative for dizziness, syncope and light-headedness. Hematological:  Negative for adenopathy. Does not bruise/bleed easily. All other systems reviewed and are negative. Physical Exam  ED Triage Vitals   Temperature Pulse Respirations Blood Pressure SpO2   11/19/23 1018 11/19/23 1018 11/19/23 1018 11/19/23 1019 11/19/23 1018   (!) 97.2 °F (36.2 °C) 70 18 118/64 100 %      Temp Source Heart Rate Source Patient Position - Orthostatic VS BP Location FiO2 (%)   11/19/23 1018 11/19/23 1018 11/19/23 1018 11/19/23 1018 --   Temporal Monitor Sitting Right arm       Pain Score       11/19/23 1018       No Pain             Orthostatic Vital Signs  Vitals:    11/19/23 1018 11/19/23 1019 11/19/23 1030   BP:  118/64 127/79   Pulse: 70  64   Patient Position - Orthostatic VS: Sitting  Sitting       Physical Exam  Vitals and nursing note reviewed. Constitutional:       General: He is not in acute distress. Appearance: He is well-developed. He is obese. He is not toxic-appearing or diaphoretic. HENT:      Head: Normocephalic and atraumatic. Right Ear: External ear normal.      Left Ear: External ear normal.      Nose: Nose normal. No congestion or rhinorrhea. Mouth/Throat:      Mouth: Mucous membranes are moist.      Pharynx: No oropharyngeal exudate or posterior oropharyngeal erythema. Eyes:      General: No scleral icterus. Extraocular Movements: Extraocular movements intact. Conjunctiva/sclera: Conjunctivae normal.      Pupils: Pupils are equal, round, and reactive to light. Cardiovascular:      Rate and Rhythm: Normal rate and regular rhythm. Pulses: Normal pulses. Heart sounds: No murmur heard. Pulmonary:      Effort: Pulmonary effort is normal. No respiratory distress. Breath sounds: Normal breath sounds. No wheezing or rales.    Abdominal: Palpations: Abdomen is soft. There is no mass. Tenderness: There is no abdominal tenderness. There is no right CVA tenderness, left CVA tenderness or guarding. Hernia: No hernia is present. Genitourinary:     Rectum: Guaiac result negative. No mass, tenderness, anal fissure, external hemorrhoid or internal hemorrhoid. Normal anal tone. Comments: Guaiac negative x2  Musculoskeletal:         General: No swelling. Normal range of motion. Cervical back: Normal range of motion and neck supple. Right lower leg: No edema. Left lower leg: No edema. Skin:     General: Skin is warm and dry. Capillary Refill: Capillary refill takes less than 2 seconds. Neurological:      General: No focal deficit present. Mental Status: He is alert and oriented to person, place, and time.    Psychiatric:         Mood and Affect: Mood normal.         Behavior: Behavior normal.         ED Medications  Medications - No data to display    Diagnostic Studies  Results Reviewed       Procedure Component Value Units Date/Time    Comprehensive metabolic panel [[id number]]  (Abnormal) Collected: 11/19/23 1052    Lab Status: Final result Specimen: Blood from Arm, Right Updated: 11/19/23 1121     Sodium 139 mmol/L      Potassium 5.2 mmol/L      Chloride 97 mmol/L      CO2 32 mmol/L      ANION GAP 10 mmol/L      BUN 54 mg/dL      Creatinine 7.94 mg/dL      Glucose 120 mg/dL      Calcium 9.1 mg/dL      AST 7 U/L      ALT 6 U/L      Alkaline Phosphatase 92 U/L      Total Protein 7.4 g/dL      Albumin 4.2 g/dL      Total Bilirubin 0.73 mg/dL      eGFR 6 ml/min/1.73sq m     Narrative:      [place] guidelines for Chronic Kidney Disease (CKD):     Stage 1 with normal or high GFR (GFR > 90 mL/min/1.73 square meters)    Stage 2 Mild CKD (GFR = 60-89 mL/min/1.73 square meters)    Stage 3A Moderate CKD (GFR = 45-59 mL/min/1.73 square meters)    Stage 3B Moderate CKD (GFR = 30-44 mL/min/1.73 square meters)    Stage 4 Severe CKD (GFR = 15-29 mL/min/1.73 square meters)    Stage 5 End Stage CKD (GFR <15 mL/min/1.73 square meters)  Note: GFR calculation is accurate only with a steady state creatinine    Protime-INR [[id number]]  (Normal) Collected: 11/19/23 1052    Lab Status: Final result Specimen: Blood from Arm, Right Updated: 11/19/23 1115     Protime 14.5 seconds      INR 1.14    CBC and differential [[id number]]  (Abnormal) Collected: 11/19/23 1052    Lab Status: Final result Specimen: Blood from Arm, Right Updated: 11/19/23 1105     WBC 4.94 Thousand/uL      RBC 3.23 Million/uL      Hemoglobin 10.5 g/dL      Hematocrit 33.8 %       fL      MCH 32.5 pg      MCHC 31.1 g/dL      RDW 15.2 %      MPV 12.1 fL      Platelets 91 Thousands/uL      nRBC 0 /100 WBCs      Neutrophils Relative 75 %      Immat GRANS % 0 %      Lymphocytes Relative 15 %      Monocytes Relative 8 %      Eosinophils Relative 2 %      Basophils Relative 0 %      Neutrophils Absolute 3.64 Thousands/µL      Immature Grans Absolute 0.02 Thousand/uL      Lymphocytes Absolute 0.76 Thousands/µL      Monocytes Absolute 0.40 Thousand/µL      Eosinophils Absolute 0.10 Thousand/µL      Basophils Absolute 0.02 Thousands/µL                    No orders to display         Procedures  Procedures      ED Course  ED Course as of 11/21/23 0916   Sun Nov 19, 2023   1050 Heme negative x2 on hemoccult                             SBIRT 20yo+      Flowsheet Row Most Recent Value   Initial Alcohol Screen: US AUDIT-C     1. How often do you have a drink containing alcohol? 0 Filed at: 11/19/2023 1045   2. How many drinks containing alcohol do you have on a typical day you are drinking? 0 Filed at: 11/19/2023 1045   3a. Male UNDER 65: How often do you have five or more drinks on one occasion? 0 Filed at: 11/19/2023 1045   3b. FEMALE Any Age, or MALE 65+: How often do you have 4 or more drinks on one occassion?  0 Filed at: 11/19/2023 1045   Audit-C Score 0 Filed at: 11/19/2023 1045   XIN: How many times in the past year have you. .. Used an illegal drug or used a prescription medication for non-medical reasons? Never Filed at: 11/19/2023 1045                  Medical Decision Making  DDx: psb internal hemorrhoid, possible fissure, possible diverticular bleed, patient is on aspirin however has not had any recent grossly bloody bowel movements. Is hemodynamically stable with stable hemoglobin. I did review patient's recent medical records and prior emergency department visits. Reassessment/disposition: Patient continues to remain stable, has stable labs, and has not had any further blood in his bowel movements. At this time will refer to gastroenterology team, will monitor symptoms closely at home, will return for significant increase in amount of blood in bowel movements, and will come back for severe abdominal pain, or other severe symptoms. Otherwise will follow-up with gastroenterology team and his primary doctor as above. Amount and/or Complexity of Data Reviewed  Labs: ordered. Disposition  Final diagnoses:   Rectal bleeding     Time reflects when diagnosis was documented in both MDM as applicable and the Disposition within this note       Time User Action Codes Description Comment    11/19/2023 11:43 AM Ardelia Angelucci Add [K62.5] Rectal bleeding           ED Disposition       ED Disposition   Discharge    Condition   Stable    Date/Time   Sun Nov 19, 2023 1143    [facility] discharge to home/self care. Follow-up Information       Follow up With Specialties Details Why Contact Info Additional [address] [facility],  Internal Medicine   [facility] [address]  [phone]       [address] [place] [address] Emergency Department Emergency Medicine Go to  If symptoms worsen, As needed [address]  [id number] [address] [facility] Emergency Department, [address], [place], [place], [facility]    [facility]   [address] [place] [address] [phone] [facility], [address], [address] [facility], [place], [place], [address] [facility]            Discharge Medication List as of 11/19/2023 11:53 AM        CONTINUE these medications which have NOT CHANGED    Details   aspirin (ECOTRIN LOW STRENGTH) 81 mg EC tablet Take 81 mg by mouth daily Resume on 8/14  , Historical Med      atorvastatin (LIPITOR) 40 mg tablet Take 1 tablet (40 mg total) by mouth daily with dinner, Starting Thu 7/20/2023, Normal      Blood Glucose Monitoring Suppl (True Metrix Meter) w/Device KIT Use to test blood sugars 3 times daily, Normal      Blood Pressure Monitoring (BLOOD PRESSURE CUFF) MISC Use to check blood pressure before taking blood pressure medication and 1 hour after and follow instructions provided in discharge instructions based on the readings. , Print      collagenase (SANTYL) ointment Apply topically daily, Starting Thu 11/16/2023, Normal      divalproex sodium (DEPAKOTE SPRINKLE) 125 MG capsule TAKE 2 CAPSULES (250 MG TOTAL) BY MOUTH EVERY 12 (TWELVE) HOURS, Starting Tue 6/20/2023, Normal      fluticasone (FLONASE) 50 mcg/act nasal spray 1 spray into each nostril daily, Starting Sun 7/9/2023, Normal      gabapentin (Neurontin) 100 mg capsule Take 1 capsule (100 mg total) by mouth 2 (two) times a day, Starting Thu 11/16/2023, Normal      lidocaine-prilocaine (EMLA) cream Apply topically as needed for mild pain, Starting Tue 11/7/2023, Normal      metoprolol succinate (TOPROL-XL) 50 mg 24 hr tablet Take 1 tablet (50 mg total) by mouth 2 (two) times a day, Starting Fri 3/17/2023, Normal      midodrine (PROAMATINE) 2.5 mg tablet Take 1 tablet (2.5 mg total) by mouth as needed (for SBP<90 on dialysis days), Starting Mon 6/5/2023, Normal      ONETOUCH DELICA LANCETS 49A MISC by Does not apply route 3 (three) times a day, Starting Tue 11/27/2018, Normal      prasugrel (EFFIENT) tablet Take 1 tablet (5 mg total) by mouth daily, Starting Tue 2/14/2023, Normal      sacubitril-valsartan (Entresto) 24-26 MG TABS Take 1 tablet by mouth in the morning Bedtime, Starting Tue 8/22/2023, Normal      Sevelamer Carbonate 2.4 g PACK VIGOROUSLY MIX CONTENTS OF 1 PACKET IN WATER (IT WILL NOT DISSOLVE) AND DRINK 3 TIMES DAILY WITH MEALS, Historical Med      True Metrix Blood Glucose Test test strip USE 1 EACH 3 TIMES A DAY AS DIRECTED, Normal      D3-50 1.25 MG (99090 UT) capsule TAKE 1 CAPSULE BY MOUTH ONE TIME PER WEEK, Normal           STOP taking these medications       oxyCODONE-acetaminophen (Percocet) 5-325 mg per tablet Comments:   Reason for Stopping:                 PDMP Review         Value Time User    PDMP Reviewed  Yes 11/8/2023  3:56 PM Jonahtan Lamas MD             ED Provider  Attending physically available and evaluated Last Almanzar I managed the patient along with the ED Attending.     Electronically Signed by           Franca Beard MD  11/21/23 4919

## 2023-11-22 ENCOUNTER — TELEPHONE (OUTPATIENT)
Dept: PSYCHIATRY | Facility: CLINIC | Age: 63
End: 2023-11-22

## 2023-11-22 NOTE — TELEPHONE ENCOUNTER
Patient has been added to the Medication Management and Talk Therapy wait list with a referral.    Insurance: [facility] Type:    Commercial []   Medicaid [x]   [place] (if applicable)   Medicare []  Location Preference: All   Provider Preference: none  Virtual: Yes [] No [x]  Were outside resources sent: Yes [x] No []    The patient contacted the office stating he never received the wait list letter and he is in need of services. The writer placed the patient back on the wait list from original date 12/29/22.

## 2023-11-24 ENCOUNTER — TELEPHONE (OUTPATIENT)
Age: 63
End: 2023-11-24

## 2023-11-24 DIAGNOSIS — E11.65 TYPE 2 DIABETES MELLITUS WITH HYPERGLYCEMIA, WITH LONG-TERM CURRENT USE OF INSULIN (HCC): ICD-10-CM

## 2023-11-24 DIAGNOSIS — Z79.4 TYPE 2 DIABETES MELLITUS WITH HYPERGLYCEMIA, WITH LONG-TERM CURRENT USE OF INSULIN (HCC): ICD-10-CM

## 2023-12-05 ENCOUNTER — TELEPHONE (OUTPATIENT)
Dept: GASTROENTEROLOGY | Facility: AMBULARY SURGERY CENTER | Age: 63
End: 2023-12-05

## 2023-12-05 ENCOUNTER — OFFICE VISIT (OUTPATIENT)
Dept: GASTROENTEROLOGY | Facility: AMBULARY SURGERY CENTER | Age: 63
End: 2023-12-05
Payer: MEDICARE

## 2023-12-05 VITALS
DIASTOLIC BLOOD PRESSURE: 82 MMHG | OXYGEN SATURATION: 96 % | BODY MASS INDEX: 30.35 KG/M2 | WEIGHT: 212 LBS | HEIGHT: 70 IN | SYSTOLIC BLOOD PRESSURE: 132 MMHG | HEART RATE: 60 BPM

## 2023-12-05 DIAGNOSIS — K62.5 RECTAL BLEEDING: Primary | ICD-10-CM

## 2023-12-05 DIAGNOSIS — Z79.02 ANTIPLATELET OR ANTITHROMBOTIC LONG-TERM USE: ICD-10-CM

## 2023-12-05 PROCEDURE — 99204 OFFICE O/P NEW MOD 45 MIN: CPT | Performed by: INTERNAL MEDICINE

## 2023-12-05 RX ORDER — SUCROFERRIC OXYHYDROXIDE 500 MG/1
1 TABLET, CHEWABLE ORAL 3 TIMES DAILY
COMMUNITY
Start: 2023-11-29

## 2023-12-05 RX ORDER — BISACODYL 5 MG/1
10 TABLET, DELAYED RELEASE ORAL ONCE
Qty: 2 TABLET | Refills: 0 | Status: SHIPPED | OUTPATIENT
Start: 2023-12-05 | End: 2023-12-05

## 2023-12-05 NOTE — TELEPHONE ENCOUNTER
Our mutual patient is scheduled for procedure: colonoscopy    On: March 07 , 2024     With: Dr. Adele Rowe MD    He/She is taking the following blood thinner: Effient (Prasugrel)    Can this be stopped  5 days prior to the procedure    Physician Approving clearance:   Britney Tan MD  Cardiology

## 2023-12-05 NOTE — ASSESSMENT & PLAN NOTE
Bleeding appears to be most likely from hemorrhoids. Rule out colorectal lesions including polyps or malignancy. -Avoid straining during defecation    -Schedule for colonoscopy. -High-fiber diet.    -Patient was given instructions about the colonoscopy prep.    -Patient was explained about the risks and benefits of the procedure. Risks including but not limited to bleeding, infection, perforation were explained in detail. Also explained about less than 100% sensitivity with the exam and other alternatives.

## 2023-12-05 NOTE — PROGRESS NOTES
Consultation - [address]  [facility]  Haley Peres 1960 male         ASSESSMENT & PLAN:    Rectal bleeding  Bleeding appears to be most likely from hemorrhoids. Rule out colorectal lesions including polyps or malignancy. -Avoid straining during defecation    -Schedule for colonoscopy. -High-fiber diet.    -Patient was given instructions about the colonoscopy prep.    -Patient was explained about the risks and benefits of the procedure. Risks including but not limited to bleeding, infection, perforation were explained in detail. Also explained about less than 100% sensitivity with the exam and other alternatives. Antiplatelet or antithrombotic long-term use  Patient is at increased risks because of antiplatelet therapy. Advised to check with his cardiologist about holding prior to the procedures. Chief Complaint: Rectal bleeding    HPI: 27-year-old male with history of end-stage renal disease on hemodialysis, on transplant list, hypertension, diabetes mellitus, hyperlipidemia, CHF, coronary artery disease status post stent placement about a year ago on antiplatelet therapy with Effient had couple of episodes of rectal bleeding. Denies any abdominal pain, nausea or vomiting. Good appetite, no recent weight loss. Regular bowel movements and denies any constipation. Denies any heartburn or acid reflux. Denies any difficulty swallowing. He had colonoscopy about 5 years ago. Chaperon: Ms. Nicolette Tran: Review of Systems   Constitutional:  Negative for activity change, appetite change, chills, diaphoresis, fatigue, fever and unexpected weight change. HENT:  Negative for ear discharge, ear pain, facial swelling, hearing loss, nosebleeds, sore throat, tinnitus and voice change. Eyes:  Negative for pain, discharge, redness, itching and visual disturbance. Respiratory:  Negative for apnea, cough, chest tightness, shortness of breath and wheezing. Cardiovascular:  Negative for chest pain and palpitations. Gastrointestinal:         As noted in HPI   Endocrine: Negative for cold intolerance, heat intolerance and polyuria. Genitourinary:  Negative for difficulty urinating, dysuria, flank pain, hematuria and urgency. Musculoskeletal:  Negative for arthralgias, back pain, gait problem, joint swelling and myalgias. Skin:  Negative for rash and wound. Neurological:  Negative for dizziness, tremors, seizures, speech difficulty, light-headedness, numbness and headaches. Hematological:  Negative for adenopathy. Does not bruise/bleed easily. Psychiatric/Behavioral:  Negative for agitation, behavioral problems and confusion. The patient is not nervous/anxious. Past Medical History:   Diagnosis Date    Cerebrovascular accident (CVA) due to thrombosis of left middle cerebral artery ([address]) 07/29/2018    Chronic kidney disease     Coronary artery disease     Diabetes mellitus ([address])     not on meds    Dialysis patient [facility])     M-W-F    Fistula     left upper arm for hemodialyis    GERD (gastroesophageal reflux disease)     History of coronary artery stent placement     x3    Hypercholesteremia     Hyperlipidemia     Hypertension     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    Limb alert care status     no BP/IV left arm    Neuropathy     Obesity     Osteomyelitis ([address])     last assessed 11/4/16-per son not currently    PVC's (premature ventricular contractions)     sees [facility]    Stroke [facility])     last weeof July 2018 [facility] [employer]    TIA (transient ischemic attack) 10/28/2018    Wears dentures     Wears glasses       Past Surgical History:   Procedure Laterality Date    ABDOMINAL SURGERY      CARDIAC CATHETERIZATION N/A 05/02/2022    Procedure: Cardiac Coronary Angiogram;  Surgeon: Anish Colin MD;  [facility];   Service: Cardiology    CARDIAC CATHETERIZATION N/A 05/02/2022    Procedure: Cardiac pci;  Surgeon: Cherl Boast Sim Adame MD;  [facility]; Service: Cardiology    CARDIAC CATHETERIZATION  02/01/2023    Procedure: Cardiac catheterization;  Surgeon: Emile Black MD;  [facility]; Service: Cardiology    CARDIAC CATHETERIZATION N/A 02/01/2023    Procedure: Cardiac pci;  Surgeon: Emile Black MD;  [facility]; Service: Cardiology    CARDIAC CATHETERIZATION N/A 02/01/2023    Procedure: Cardiac Coronary Angiogram;  Surgeon: Emile Black MD;  [facility]; Service: Cardiology    CARDIAC CATHETERIZATION N/A 02/01/2023    Procedure: Cardiac other-IVUS;  Surgeon: Emile Black MD;  [facility]; Service: Cardiology    CHOLECYSTECTOMY      Percutaneous    COLONOSCOPY      CYSTOSCOPY      IR LOWER EXTREMITY ANGIOGRAM  9/29/2023    OTHER SURGICAL HISTORY      "stimulator to control bowel movements"    OR ESOPHAGOGASTRODUODENOSCOPY TRANSORAL DIAGNOSTIC N/A 09/27/2016    Procedure: ESOPHAGOGASTRODUODENOSCOPY (EGD); Surgeon: Tonya Sheppard MD;  Location: [facility] GI [facility];   Service: Gastroenterology    OR LAPAROSCOPY SURG CHOLECYSTECTOMY N/A 02/29/2016    Procedure: LAPAROSCOPIC CHOLECYSTECTOMY ;  Surgeon: Cm Gusman DO;  Location: [facility] Main OR;  Service: General    OR [facility] 3RD+ ORD SLCTV ABDL PEL/LXTR [facility] Left 9/29/2023    Procedure: Left leg angiogram with intervention;  Surgeon: Adamaris Bernal MD;  Location: [facility] MA[facility];  Service: Vascular    ROTATOR CUFF REPAIR Right     SPINAL CORD STIMULATOR IMPLANT      "Medtronic interstim model # 8846- in lower back to control bowel movements-currently turned off-battery is dead"    TOE AMPUTATION Right 10/28/2016    Procedure: 3RD TOE AMPUTATION ;  Surgeon: Valeria Gross DPM;  Location: [facility];  Service:      Social History     Socioeconomic History    Marital status: /Civil Union     Spouse name: Not on file    Number of children: Not on file    Years of education: Not on file Highest education level: Not asked   Occupational History    Occupation: disabled   Tobacco Use    Smoking status: Never    Smokeless tobacco: Never   Vaping Use    Vaping Use: Never used   Substance and Sexual Activity    Alcohol use: Never    Drug use: No    Sexual activity: Not on file     Comment: defer   Other Topics Concern    Not on file   Social History Narrative    Daily caffeine consumption 2-3 servings a day     Social Determinants of Health     Financial Resource Strain: Unknown (7/13/2023)    Overall Financial Resource Strain (CARDIA)     Difficulty of Paying Living Expenses: Patient refused   Food Insecurity: No Food Insecurity (2/1/2023)    Hunger Vital Sign     Worried About Running Out of Food in the Last Year: Never true     Ran Out of Food in the Last Year: Never true   Transportation Needs: No Transportation Needs (7/13/2023)    PRAPARE - Transportation     Lack of Transportation (Medical): No     Lack of Transportation (Non-Medical): No   Physical Activity: Not on file   Stress: No Stress Concern Present (1/18/2019)    [facility]     Feeling of Stress :  Only a little   Social Connections: Unknown (1/18/2019)    Social Connection and Isolation Panel [NHANES]     Frequency of Communication with Friends and Family: Not on file     Frequency of Social Gatherings with Friends and Family: Not on file     Attends [employer] Services: Not on file     Active Member of Clubs or Organizations: Not on file     Attends Club or Organization Meetings: Not on file     Marital Status:    Intimate Partner Violence: Not At Risk (1/18/2019)    Humiliation, Afraid, Rape, and Kick questionnaire     Fear of Current or Ex-Partner: No     Emotionally Abused: No     Physically Abused: No     Sexually Abused: No   Housing Stability: [address]  (2/1/2023)    Housing Stability Vital Sign     Unable to Pay for Housing in the Last Year: No     Number of Places Lived in the Last Year: 1     Unstable Housing in the Last Year: No     Family History   Problem Relation Age of Onset    Leukemia Mother     Liver disease Mother     Lung cancer Mother         heavy smoker - 3 ppd    Heart disease Father     Liver disease Father     Diabetes type I Father     Multiple myeloma Sister     Breast cancer Sister     Urolithiasis Family     Alcohol abuse Neg Hx     Depression Neg Hx     Drug abuse Neg Hx     Substance Abuse Neg Hx     Mental illness Neg Hx      Patient has no known allergies. Current Outpatient Medications   Medication Sig Dispense Refill    aspirin (ECOTRIN LOW STRENGTH) 81 mg EC tablet Take 81 mg by mouth daily Resume on 8/14        atorvastatin (LIPITOR) 40 mg tablet Take 1 tablet (40 mg total) by mouth daily with dinner 90 tablet 1    bisacodyl (DULCOLAX) 5 mg EC tablet Take 2 tablets (10 mg total) by mouth once for 1 dose 2 tablet 0    Blood Pressure Monitoring (BLOOD PRESSURE CUFF) MISC Use to check blood pressure before taking blood pressure medication and 1 hour after and follow instructions provided in discharge instructions based on the readings.  1 each 0    collagenase (SANTYL) ointment Apply topically daily 90 g 0    D3-50 1.25 MG (30576 UT) capsule TAKE 1 CAPSULE BY MOUTH ONE TIME PER WEEK 12 capsule 2    divalproex sodium (DEPAKOTE SPRINKLE) 125 MG capsule TAKE 2 CAPSULES (250 MG TOTAL) BY MOUTH EVERY 12 (TWELVE) HOURS 360 capsule 1    gabapentin (Neurontin) 100 mg capsule Take 1 capsule (100 mg total) by mouth 2 (two) times a day 60 capsule 0    lidocaine-prilocaine (EMLA) cream Apply topically as needed for mild pain 30 g 3    metoprolol succinate (TOPROL-XL) 50 mg 24 hr tablet Take 1 tablet (50 mg total) by mouth 2 (two) times a day 180 tablet 3    midodrine (PROAMATINE) 2.5 mg tablet Take 1 tablet (2.5 mg total) by mouth as needed (for SBP<90 on dialysis days) 30 tablet 0    polyethylene glycol (GOLYTELY) 4000 mL solution Take 4,000 mL by mouth once for 1 dose 4000 mL 0    prasugrel (EFFIENT) tablet Take 1 tablet (5 mg total) by mouth daily (Patient taking differently: Take 5 mg by mouth daily Takes with pudding) 90 tablet 3    sacubitril-valsartan (Entresto) 24-26 MG TABS Take 1 tablet by mouth in the morning Bedtime 90 tablet 3    Sevelamer Carbonate 2.4 g PACK VIGOROUSLY MIX CONTENTS OF 1 PACKET IN WATER (IT WILL NOT DISSOLVE) AND DRINK 3 TIMES DAILY WITH MEALS      True Metrix Blood Glucose Test test strip USE 1 EACH 3 TIMES A DAY AS DIRECTED 300 strip 1    Velphoro 500 MG CHEW Chew 1 tablet Three times a day      Blood Glucose Monitoring Suppl (True Metrix Meter) w/Device KIT Use to test blood sugars 3 times daily (Patient not taking: Reported on 12/5/2023) 1 kit 0    fluticasone (FLONASE) 50 mcg/act nasal spray 1 spray into each nostril daily (Patient not taking: Reported on 11/9/2023) 9.9 mL 0    ONETOUCH DELICA LANCETS 64I MISC by Does not apply route 3 (three) times a day (Patient not taking: Reported on 12/5/2023) 270 each 1     No current facility-administered medications for this visit. Blood pressure 132/82, pulse 60, height 5' 10" (1.778 m), weight 96.2 kg (212 lb), SpO2 96 %. PHYSICAL EXAM: Physical Exam  Constitutional:       Appearance: He is well-developed. HENT:      Head: Normocephalic and atraumatic. Eyes:      General: No scleral icterus. Right eye: No discharge. Left eye: No discharge. Conjunctiva/sclera: Conjunctivae normal.      Pupils: Pupils are equal, round, and reactive to light. Neck:      Thyroid: No thyromegaly. Vascular: No JVD. Trachea: No tracheal deviation. Cardiovascular:      Rate and Rhythm: Normal rate and regular rhythm. Heart sounds: Normal heart sounds. No murmur heard. No friction rub. No gallop. Pulmonary:      Effort: Pulmonary effort is normal. No respiratory distress. Breath sounds: Normal breath sounds. No wheezing or rales.    Chest:      Chest wall: No tenderness. Abdominal:      General: Bowel sounds are normal. There is no distension. Palpations: Abdomen is soft. There is no mass. Tenderness: There is no abdominal tenderness. There is no guarding or rebound. Hernia: No hernia is present. Musculoskeletal:      Cervical back: Neck supple. Lymphadenopathy:      Cervical: No cervical adenopathy. Skin:     General: Skin is warm and dry. Findings: No erythema or rash. Neurological:      Mental Status: He is alert and oriented to person, place, and time. Psychiatric:         Behavior: Behavior normal.         Thought Content: Thought content normal.        Lab Results   Component Value Date    WBC 4.94 11/19/2023    HGB 10.5 (L) 11/19/2023    HCT 33.8 (L) 11/19/2023     (H) 11/19/2023    PLT 91 (L) 11/19/2023     Lab Results   Component Value Date    GLUCOSE 92 09/29/2023    CALCIUM 9.1 11/19/2023     08/10/2016    K 5.2 11/19/2023    CO2 32 11/19/2023    CL 97 11/19/2023    BUN 54 (H) 11/19/2023    CREATININE 7.94 (H) 11/19/2023     Lab Results   Component Value Date    ALT 6 (L) 11/19/2023    AST 7 (L) 11/19/2023    ALKPHOS 92 11/19/2023    BILITOT 0.6 08/10/2016     Lab Results   Component Value Date    INR 1.14 11/19/2023    INR 1.03 01/29/2023    INR 1.01 10/20/2022    PROTIME 14.5 11/19/2023    PROTIME 13.7 01/29/2023    PROTIME 13.5 10/20/2022       No results found.

## 2023-12-05 NOTE — ASSESSMENT & PLAN NOTE
Patient is at increased risks because of antiplatelet therapy. Advised to check with his cardiologist about holding prior to the procedures.

## 2023-12-05 NOTE — PATIENT INSTRUCTIONS
Scheduled date of colonoscopy (as of today):  03/07/24  Physician performing colonoscopy:  Dr Cesar Hope  Location of colonoscopy:   [facility]   Bowel prep reviewed with patient: Golytely  Instructions reviewed with patient by:  Rafa Barragan   Clearances: Effient hold to cardio

## 2023-12-06 ENCOUNTER — TELEPHONE (OUTPATIENT)
Age: 63
End: 2023-12-06

## 2023-12-06 DIAGNOSIS — K62.5 RECTAL BLEEDING: Primary | ICD-10-CM

## 2023-12-06 NOTE — TELEPHONE ENCOUNTER
Routing to GI office  Can pt be prescribed Rommie Gauze that would be covered from insurance?   Thank you

## 2023-12-07 ENCOUNTER — HOSPITAL ENCOUNTER (OUTPATIENT)
Dept: RADIOLOGY | Facility: HOSPITAL | Age: 63
Discharge: HOME | End: 2023-12-07
Attending: FAMILY MEDICINE
Payer: MEDICARE

## 2023-12-07 ENCOUNTER — OFFICE VISIT (OUTPATIENT)
Dept: WOUND CARE | Facility: HOSPITAL | Age: 63
End: 2023-12-07
Payer: MEDICARE

## 2023-12-07 VITALS
RESPIRATION RATE: 12 BRPM | DIASTOLIC BLOOD PRESSURE: 59 MMHG | SYSTOLIC BLOOD PRESSURE: 125 MMHG | HEART RATE: 68 BPM | TEMPERATURE: 97.6 F

## 2023-12-07 DIAGNOSIS — Z99.2 TYPE 2 DIABETES MELLITUS WITH CHRONIC KIDNEY DISEASE ON CHRONIC DIALYSIS, WITHOUT LONG-TERM CURRENT USE OF INSULIN (HCC): ICD-10-CM

## 2023-12-07 DIAGNOSIS — L97.422 ULCER OF LEFT HEEL, WITH FAT LAYER EXPOSED (HCC): Primary | ICD-10-CM

## 2023-12-07 DIAGNOSIS — I73.9 PERIPHERAL ARTERIAL DISEASE (HCC): ICD-10-CM

## 2023-12-07 DIAGNOSIS — L97.422 ULCER OF LEFT HEEL, WITH FAT LAYER EXPOSED (HCC): ICD-10-CM

## 2023-12-07 DIAGNOSIS — N18.6 TYPE 2 DIABETES MELLITUS WITH CHRONIC KIDNEY DISEASE ON CHRONIC DIALYSIS, WITHOUT LONG-TERM CURRENT USE OF INSULIN (HCC): ICD-10-CM

## 2023-12-07 DIAGNOSIS — E11.22 TYPE 2 DIABETES MELLITUS WITH CHRONIC KIDNEY DISEASE ON CHRONIC DIALYSIS, WITHOUT LONG-TERM CURRENT USE OF INSULIN (HCC): ICD-10-CM

## 2023-12-07 PROCEDURE — 11042 DBRDMT SUBQ TIS 1ST 20SQCM/<: CPT | Performed by: FAMILY MEDICINE

## 2023-12-07 PROCEDURE — 99204 OFFICE O/P NEW MOD 45 MIN: CPT | Performed by: FAMILY MEDICINE

## 2023-12-07 PROCEDURE — 73650 X-RAY EXAM OF HEEL: CPT

## 2023-12-07 PROCEDURE — 11045 DBRDMT SUBQ TISS EACH ADDL: CPT | Performed by: FAMILY MEDICINE

## 2023-12-07 RX ORDER — SODIUM HYPOCHLORITE 2.5 MG/ML
1 SOLUTION TOPICAL EVERY OTHER DAY
Qty: 473 ML | Refills: 0 | Status: SHIPPED | OUTPATIENT
Start: 2023-12-07

## 2023-12-07 RX ORDER — LIDOCAINE 40 MG/G
CREAM TOPICAL ONCE
Status: COMPLETED | OUTPATIENT
Start: 2023-12-07 | End: 2023-12-07

## 2023-12-07 RX ADMIN — LIDOCAINE 1 APPLICATION: 40 CREAM TOPICAL at 14:42

## 2023-12-07 NOTE — PATIENT INSTRUCTIONS
Orders Placed This Encounter   Procedures    Wound off loading     Off-loading Instructions:     Keep weight and pressure off wound at all times. Put a pillow under your leg for pressure of from your left heel when lying in bed or having your feet elevated in a chair. Wear off-loading device as directed by your physician (Globoped shoe). Put on immediately when rising in the morning and remove when going to bed. Avoid position directing pressure to Wound site. Standing Status:   Future     Standing Expiration Date:   12/7/2024    Wound cleansing and dressings     Wound location: Left heel   Change dressing 3 x week  The dressing must stay dry and intact. You may shower with dressing protected by cast cover or bag. Or you may sponge bathe. Call wound center or home health nurse if dressing becomes wet. Cleanse the wound with normal saline or mild soap and water, pat dry. Apply 1/4 strength Dakin's soaken gauze to wound bed for 15 minutes, and then remove it  Apply Alginate Ag to wound. Do not use Santyl until next wound care visit    Cover with gauze  Secure with roll gauze and tape. This was applied today at the [facility]. Dakin's solution ordered to your pharmacy today. X-ray for Left heel ordered today. Dressing supplies ordered today to [facility]. Standing Status:   Future     Standing Expiration Date:   12/7/2024    XR heel / calcaneus 2+ vw left     Heel ulcer, concern for osteo     Standing Status:   Future     Standing Expiration Date:   12/7/2027     Scheduling Instructions:      Bring along any outside films relating to this procedure.

## 2023-12-07 NOTE — PROGRESS NOTES
Patient ID: Sherell Baumgarten is a 61 y.o. male Date of Birth 1960     Chief Complaint  Chief Complaint   Patient presents with    Follow Up Wound Care Visit     LLE wound       Allergies  Patient has no known allergies. Assessment:    Type 2 diabetes mellitus with chronic kidney disease on chronic dialysis, without long-term current use of insulin ([facility])    Lab Results   Component Value Date    HGBA1C 4.9 04/18/2023        Diagnoses and all orders for this visit:    Ulcer of left heel, with fat layer exposed ([address])  -     lidocaine (LMX) 4 % cream  -     Cancel: Wound cleansing and dressings; Future  -     Wound off loading; Future  -     XR heel / calcaneus 2+ vw left; Future  -     sodium hypochlorite (DAKIN'S HALF-STRENGTH) external solution; Apply 1 Application topically every other day At each dressing change  -     Wound cleansing and dressings; Future    Type 2 diabetes mellitus with chronic kidney disease on chronic dialysis, without long-term current use of insulin ([address])    Peripheral arterial disease ([address])    Other orders  -     Debridement              Debridement   Wound 06/02/23 Pressure Injury Heel Left    Universal Protocol:  Consent: Verbal consent obtained. Consent given by: patient  Time out: Immediately prior to procedure a "time out" was called to verify the correct patient, procedure, equipment, support staff and site/side marked as required.   Timeout called at: 12/7/2023 2:40 PM.  Patient understanding: patient states understanding of the procedure being performed  Patient identity confirmed: verbally with patient    Performed by: physician  Debridement type: surgical  Level of debridement: subcutaneous tissue  Pain control: lidocaine 4%  Post-debridement measurements  Length (cm): 1.6  Width (cm): 2.5  Depth (cm): 0.3  Percent debrided: 100%  Surface Area (cm^2): 4  Area debrided (cm^2): 4  Volume (cm^3): 1.2  Tissue and other material debrided: subcutaneous tissue  Devitalized tissue debrided: exudate, necrotic debris, slough and eschar  Instrument(s) utilized: curette  Bleeding: small  Hemostasis obtained with: pressure  Response to treatment: procedure was tolerated well        Plan:  Wound is worse  Wound debrided as above. Still unstageable postdebridement  X-ray ordered today  Wound management changed to Dakin's soak and silver alginate, see wound orders below  Extensively discussed the importance of pressure-relief and that he MUST wear the Prevalon boot at nighttime  Diabetes listed as a diagnosis but patient reports that he no longer has this. Last A1c, which was from 11/30/2023, was 4.9. A1C results reviewed with the patient today. Follow-up in 2 weeks or call sooner with questions or concerns    Wound 06/02/23 Pressure Injury Heel Left (Active)   Wound Image Images linked 12/07/23 1521   Wound Description Necrotic;Brown;Yellow 12/07/23 1433   Lani-wound Assessment Intact; Pink 12/07/23 1433   Wound Length (cm) 1.6 cm 12/07/23 1433   Wound Width (cm) 2.5 cm 12/07/23 1433   Wound Depth (cm) 0.2 cm 12/07/23 1433   Wound Surface Area (cm^2) 4 cm^2 12/07/23 1433   Wound Volume (cm^3) 0.8 cm^3 12/07/23 1433   Calculated Wound Volume (cm^3) 0.8 cm^3 12/07/23 1433   Drainage Amount Moderate 12/07/23 1433   Drainage Description Serosanguineous 12/07/23 1433   Non-staged Wound Description Full thickness 12/07/23 1433   Patient Tolerance Tolerated well 12/07/23 1433   Dressing Status Intact 12/07/23 1433       Wound 01/29/23 Abrasion(s) Pretibial Right (Active)   Date First Assessed/Time First Assessed: 01/29/23 1218   Pre-Existing Wound: Yes  Traumatic Wound Type: Abrasion(s)  Location: Pretibial  Wound Location Orientation: Right  Wound Description (Comments): Scabbed       Wound 02/01/23 Wrist Anterior;Right (Active)   Date First Assessed/Time First Assessed: 02/01/23 1806   Pre-Existing Wound: No  Location: Wrist  Wound Location Orientation: Anterior;Right       Wound 02/01/23 Skin Tear Abrasion(s) Arm Left;Posterior;Proximal (Active)   Date First Assessed/Time First Assessed: 02/01/23 1930   Pre-Existing Wound: Yes  Primary Wound Type: Skin Tear  Traumatic Wound Type: Abrasion(s)  Location: Arm  Wound Location Orientation: Left;Posterior;Proximal       Wound 06/02/23 Pressure Injury Heel Left (Active)   Date First Assessed/Time First Assessed: 06/02/23 1908   Primary Wound Type: Pressure Injury  Location: Heel  Wound Location Orientation: Left  Dressing Status: Clean;Dry; Intact       Wound 09/29/23 Groin Right (Active)   Date First Assessed/Time First Assessed: 09/29/23 0957   Location: Groin  Wound Location Orientation: Right  Wound Description (Comments): New Puncture site noted   Incision's 1st Dressing: ADHESIVE SKIN HIGH VISCOSITY EXOFIN 1ML (x1)       Subjective:      . Patient is a patient of Dr. Tyrel Barragan who presents today for follow-up of a left heel wound. Has been using Santyl on the wound and wearing a heel relief shoe. Admits to not wearing the Prevalon boot at nighttime. The following portions of the patient's history were reviewed and updated as appropriate: He  has a past medical history of Cerebrovascular accident (CVA) due to thrombosis of left middle cerebral artery ([address]) (07/29/2018), Chronic kidney disease, Coronary artery disease, Diabetes mellitus ([address]), Dialysis patient ([address]), Fistula, GERD (gastroesophageal reflux disease), History of coronary artery stent placement, Hypercholesteremia, Hyperlipidemia, Hypertension, Infectious viral hepatitis, Limb alert care status, Neuropathy, Obesity, Osteomyelitis ([address]), PVC's (premature ventricular contractions), Stroke ([address]), TIA (transient ischemic attack) (10/28/2018), Wears dentures, and Wears glasses.   He   Patient Active Problem List    Diagnosis Date Noted    Rectal bleeding 12/05/2023    Ulcer of left heel, limited to breakdown of skin ([address]) 04/25/2023    Elevated troponin 03/17/2023    Presence of external cardiac defibrillator 03/17/2023    Diabetic polyneuropathy associated with type 2 diabetes mellitus ([address]) 03/07/2023    Absence of toe of right foot ([address]) 03/07/2023    Hammer toes of both feet 03/07/2023    Type 1 non-ST elevation myocardial infarction (NSTEMI) s/p ADE to in-stent restenosis of mid LAD 02/02/2023    Ischemic cardiomyopathy 02/02/2023    Moderate AS (aortic stenosis) 02/02/2023    Mood disorder ([address]) 02/02/2023    SOB (shortness of breath) 10/21/2022    Left arm swelling 10/21/2022    CAD, multiple vessel 07/14/2022    Electrolyte abnormality 05/02/2022    Chest pain 05/01/2022    Generalized weakness 04/19/2022    Primary hypertension 04/06/2022    Penetrating foot wound, left, initial encounter 01/19/2022    Emotional lability 01/19/2022    History of 2019 novel coronavirus disease (COVID-19) 12/26/2020    ESRD on dialysis ([address]) 12/26/2020    Anemia 10/05/2020    S/P arteriovenous (AV) fistula creation 04/27/2020    Pre-kidney transplant, listed 04/27/2020    Urge incontinence 12/20/2019    Anxiety associated with depression 02/28/2019    History of stroke 10/04/2018    Abnormal EEG 09/24/2018    Other constipation 08/17/2018    GERD (gastroesophageal reflux disease) 08/13/2018    Diabetic macular edema ([address]) 08/09/2018    Elevated alkaline phosphatase level 08/03/2018    Nephrotic range proteinuria 03/28/2018    Type 2 diabetes mellitus with chronic kidney disease on chronic dialysis, without long-term current use of insulin ([address]) 02/26/2016    Dizziness 02/26/2016    Mixed hyperlipidemia 02/26/2016    Antiplatelet or antithrombotic long-term use 02/26/2016     He  has a past surgical history that includes Rotator cuff repair (Right); Abdominal surgery; Other surgical history; Cholecystectomy; Toe amputation (Right, 10/28/2016); pr esophagogastroduodenoscopy transoral diagnostic (N/A, 09/27/2016); pr laparoscopy surg cholecystectomy (N/A, 02/29/2016); Cystoscopy;  Colonoscopy; Cardiac catheterization (N/A, 05/02/2022); Cardiac catheterization (N/A, 05/02/2022); Cardiac catheterization (02/01/2023); Cardiac catheterization (N/A, 02/01/2023); Cardiac catheterization (N/A, 02/01/2023); Cardiac catheterization (N/A, 02/01/2023); Spinal cord stimulator implant; pr slctv cathj 3rd+ ord slctv abdl pel/lxtr brnch (Left, 9/29/2023); and IR lower extremity angiogram (9/29/2023). He  reports that he has never smoked. He has never used smokeless tobacco. He reports that he does not drink alcohol and does not use drugs. Current Outpatient Medications   Medication Sig Dispense Refill    sodium hypochlorite (DAKIN'S HALF-STRENGTH) external solution Apply 1 Application topically every other day At each dressing change 473 mL 0    aspirin (ECOTRIN LOW STRENGTH) 81 mg EC tablet Take 81 mg by mouth daily Resume on 8/14        atorvastatin (LIPITOR) 40 mg tablet Take 1 tablet (40 mg total) by mouth daily with dinner 90 tablet 1    bisacodyl (DULCOLAX) 5 mg EC tablet Take 2 tablets (10 mg total) by mouth once for 1 dose 2 tablet 0    Blood Glucose Monitoring Suppl (True Metrix Meter) w/Device KIT Use to test blood sugars 3 times daily (Patient not taking: Reported on 12/5/2023) 1 kit 0    Blood Pressure Monitoring (BLOOD PRESSURE CUFF) MISC Use to check blood pressure before taking blood pressure medication and 1 hour after and follow instructions provided in discharge instructions based on the readings.  1 each 0    collagenase (SANTYL) ointment Apply topically daily 90 g 0    D3-50 1.25 MG (88964 UT) capsule TAKE 1 CAPSULE BY MOUTH ONE TIME PER WEEK 12 capsule 2    divalproex sodium (DEPAKOTE SPRINKLE) 125 MG capsule TAKE 2 CAPSULES (250 MG TOTAL) BY MOUTH EVERY 12 (TWELVE) HOURS 360 capsule 1    fluticasone (FLONASE) 50 mcg/act nasal spray 1 spray into each nostril daily (Patient not taking: Reported on 11/9/2023) 9.9 mL 0    gabapentin (Neurontin) 100 mg capsule Take 1 capsule (100 mg total) by mouth 2 (two) times a day 60 capsule 0    lidocaine-prilocaine (EMLA) cream Apply topically as needed for mild pain 30 g 3    metoprolol succinate (TOPROL-XL) 50 mg 24 hr tablet Take 1 tablet (50 mg total) by mouth 2 (two) times a day 180 tablet 3    midodrine (PROAMATINE) 2.5 mg tablet Take 1 tablet (2.5 mg total) by mouth as needed (for SBP<90 on dialysis days) 30 tablet 0    ONETOUCH DELICA LANCETS 00O MISC by Does not apply route 3 (three) times a day (Patient not taking: Reported on 12/5/2023) 270 each 1    polyethylene glycol (COLYTE) 4000 mL solution Take 4,000 mL by mouth once for 1 dose 4000 mL 0    polyethylene glycol (GOLYTELY) 4000 mL solution Take 4,000 mL by mouth once for 1 dose 4000 mL 0    prasugrel (EFFIENT) tablet Take 1 tablet (5 mg total) by mouth daily (Patient taking differently: Take 5 mg by mouth daily Takes with pudding) 90 tablet 3    sacubitril-valsartan (Entresto) 24-26 MG TABS Take 1 tablet by mouth in the morning Bedtime 90 tablet 3    Sevelamer Carbonate 2.4 g PACK VIGOROUSLY MIX CONTENTS OF 1 PACKET IN WATER (IT WILL NOT DISSOLVE) AND DRINK 3 TIMES DAILY WITH MEALS      True Metrix Blood Glucose Test test strip USE 1 EACH 3 TIMES A DAY AS DIRECTED 300 strip 1    Velphoro 500 MG CHEW Chew 1 tablet Three times a day       No current facility-administered medications for this visit. He has No Known Allergies. .    Review of Systems   Constitutional:  Negative for chills and fever. HENT:  Negative for congestion and sneezing. Respiratory:  Negative for cough. Skin:  Positive for wound. Psychiatric/Behavioral:  Negative for agitation. Objective:       Wound 06/02/23 Pressure Injury Heel Left (Active)   Wound Image Images linked 12/07/23 1521   Wound Description Necrotic;Brown;Yellow 12/07/23 1433   Lani-wound Assessment Intact; Pink 12/07/23 1433   Wound Length (cm) 1.6 cm 12/07/23 1433   Wound Width (cm) 2.5 cm 12/07/23 1433   Wound Depth (cm) 0.2 cm 12/07/23 1433   Wound Surface Area (cm^2) 4 cm^2 12/07/23 1433   Wound Volume (cm^3) 0.8 cm^3 12/07/23 1433   Calculated Wound Volume (cm^3) 0.8 cm^3 12/07/23 1433   Drainage Amount Moderate 12/07/23 1433   Drainage Description Serosanguineous 12/07/23 1433   Non-staged Wound Description Full thickness 12/07/23 1433   Patient Tolerance Tolerated well 12/07/23 1433   Dressing Status Intact 12/07/23 1433       /59   Pulse 68   Temp 97.6 °F (36.4 °C)   Resp 12     Physical Exam        Wound 01/29/23 Abrasion(s) Pretibial Right (Active)       Wound 02/01/23 Wrist Anterior;Right (Active)       Wound 02/01/23 Skin Tear Abrasion(s) Arm Left;Posterior;Proximal (Active)       Wound 06/02/23 Pressure Injury Heel Left (Active)   Enter [place] score: Oliveros Grade 1: Partial or full-thickness ulcer (superficial) 11/16/23 0819   Wound Image   12/07/23 1521   Wound Description Necrotic;Brown;Yellow 12/07/23 1433   Lani-wound Assessment Intact; Pink 12/07/23 1433   Wound Length (cm) 1.6 cm 12/07/23 1433   Wound Width (cm) 2.5 cm 12/07/23 1433   Wound Depth (cm) 0.2 cm 12/07/23 1433   Wound Surface Area (cm^2) 4 cm^2 12/07/23 1433   Wound Volume (cm^3) 0.8 cm^3 12/07/23 1433   Calculated Wound Volume (cm^3) 0.8 cm^3 12/07/23 1433   Drainage Amount Moderate 12/07/23 1433   Drainage Description Serosanguineous 12/07/23 1433   Non-staged Wound Description Full thickness 12/07/23 1433   Patient Tolerance Tolerated well 12/07/23 1433   Dressing Status Intact 12/07/23 1433       Wound 09/29/23 Groin Right (Active)                       Wound Instructions:  Orders Placed This Encounter   Procedures    Wound off loading     Off-loading Instructions:     Keep weight and pressure off wound at all times. Put a pillow under your leg for pressure of from your left heel when lying in bed or having your feet elevated in a chair. Wear off-loading device as directed by your physician (Globoped shoe). Put on immediately when rising in the morning and remove when going to bed.   Avoid position directing pressure to Wound site. Standing Status:   Future     Standing Expiration Date:   12/7/2024    Wound cleansing and dressings     Wound location: Left heel   Change dressing 3 x week  The dressing must stay dry and intact. You may shower with dressing protected by cast cover or bag. Or you may sponge bathe. Call wound center or home health nurse if dressing becomes wet. Cleanse the wound with normal saline or mild soap and water, pat dry. Apply 1/4 strength Dakin's soaken gauze to wound bed for 15 minutes, and then remove it  Apply Alginate Ag to wound. Do not use Santyl until next wound care visit    Cover with gauze  Secure with roll gauze and tape. This was applied today at the [facility]. Dakin's solution ordered to your pharmacy today. X-ray for Left heel ordered today. Dressing supplies ordered today to [facility]. Standing Status:   Future     Standing Expiration Date:   12/7/2024    Debridement     This order was created via procedure documentation    XR heel / calcaneus 2+ vw left     Heel ulcer, concern for osteo     Standing Status:   Future     Number of Occurrences:   1     Standing Expiration Date:   12/7/2027     Scheduling Instructions:      Bring along any outside films relating to this procedure. Diagnosis ICD-10-CM Associated Orders   1. Ulcer of left heel, with fat layer exposed ([address])  L97.422 lidocaine (LMX) 4 % cream     Wound off loading     XR heel / calcaneus 2+ vw left     sodium hypochlorite (DAKIN'S HALF-STRENGTH) external solution     Wound cleansing and dressings      2. Type 2 diabetes mellitus with chronic kidney disease on chronic dialysis, without long-term current use of insulin ([facility])  E11.22     N18.6     Z99.2       3.  Peripheral arterial disease ([facility])  I73.9

## 2023-12-13 ENCOUNTER — RA CDI HCC (OUTPATIENT)
Dept: OTHER | Facility: HOSPITAL | Age: 63
End: 2023-12-13

## 2023-12-13 ENCOUNTER — TELEPHONE (OUTPATIENT)
Dept: PSYCHIATRY | Facility: CLINIC | Age: 63
End: 2023-12-13

## 2023-12-13 NOTE — PROGRESS NOTES
[address] [facility] coding opportunities          Chart Reviewed number of suggestions sent to Provider: [phone]  I13.2    Patients Insurance     Medicare Insurance: Medicare

## 2023-12-13 NOTE — TELEPHONE ENCOUNTER
Contacted patient off of Medication Management  wait list to verify needs of services in attempts to update list with patient preferences. Spoke with patient's son whom stated did not require services anymore.

## 2023-12-14 DIAGNOSIS — E55.9 VITAMIN D DEFICIENCY: ICD-10-CM

## 2023-12-14 RX ORDER — CHOLECALCIFEROL (VITAMIN D3) 1250 MCG
CAPSULE ORAL
Qty: 12 CAPSULE | Refills: 3 | Status: SHIPPED | OUTPATIENT
Start: 2023-12-14

## 2023-12-21 ENCOUNTER — HOSPITAL ENCOUNTER (OUTPATIENT)
Dept: NON INVASIVE DIAGNOSTICS | Facility: CLINIC | Age: 63
Discharge: HOME | End: 2023-12-21
Payer: MEDICARE

## 2023-12-21 ENCOUNTER — OFFICE VISIT (OUTPATIENT)
Dept: WOUND CARE | Facility: HOSPITAL | Age: 63
End: 2023-12-21
Payer: MEDICARE

## 2023-12-21 ENCOUNTER — OFFICE VISIT (OUTPATIENT)
Dept: INTERNAL MEDICINE CLINIC | Facility: CLINIC | Age: 63
End: 2023-12-21
Payer: MEDICARE

## 2023-12-21 VITALS
OXYGEN SATURATION: 98 % | WEIGHT: 211 LBS | BODY MASS INDEX: 30.28 KG/M2 | DIASTOLIC BLOOD PRESSURE: 82 MMHG | TEMPERATURE: 98 F | HEART RATE: 84 BPM | SYSTOLIC BLOOD PRESSURE: 122 MMHG

## 2023-12-21 VITALS
HEIGHT: 70 IN | WEIGHT: 210.98 LBS | HEART RATE: 62 BPM | DIASTOLIC BLOOD PRESSURE: 82 MMHG | SYSTOLIC BLOOD PRESSURE: 122 MMHG | BODY MASS INDEX: 30.2 KG/M2

## 2023-12-21 VITALS
DIASTOLIC BLOOD PRESSURE: 57 MMHG | RESPIRATION RATE: 12 BRPM | HEART RATE: 70 BPM | TEMPERATURE: 97 F | SYSTOLIC BLOOD PRESSURE: 122 MMHG

## 2023-12-21 DIAGNOSIS — E11.22 TYPE 2 DIABETES MELLITUS WITH CHRONIC KIDNEY DISEASE ON CHRONIC DIALYSIS, WITHOUT LONG-TERM CURRENT USE OF INSULIN (HCC): ICD-10-CM

## 2023-12-21 DIAGNOSIS — N18.6 TYPE 2 DIABETES MELLITUS WITH CHRONIC KIDNEY DISEASE ON CHRONIC DIALYSIS, WITHOUT LONG-TERM CURRENT USE OF INSULIN (HCC): ICD-10-CM

## 2023-12-21 DIAGNOSIS — F39 MOOD DISORDER (HCC): ICD-10-CM

## 2023-12-21 DIAGNOSIS — N18.6 ESRD ON DIALYSIS (HCC): ICD-10-CM

## 2023-12-21 DIAGNOSIS — I25.5 ISCHEMIC CARDIOMYOPATHY: ICD-10-CM

## 2023-12-21 DIAGNOSIS — Z76.82 PRE-KIDNEY TRANSPLANT, LISTED: ICD-10-CM

## 2023-12-21 DIAGNOSIS — F41.8 ANXIETY ASSOCIATED WITH DEPRESSION: ICD-10-CM

## 2023-12-21 DIAGNOSIS — R04.0 EPISTAXIS: Primary | ICD-10-CM

## 2023-12-21 DIAGNOSIS — Z99.2 ESRD ON DIALYSIS (HCC): ICD-10-CM

## 2023-12-21 DIAGNOSIS — I10 PRIMARY HYPERTENSION: ICD-10-CM

## 2023-12-21 DIAGNOSIS — Z99.2 TYPE 2 DIABETES MELLITUS WITH CHRONIC KIDNEY DISEASE ON CHRONIC DIALYSIS, WITHOUT LONG-TERM CURRENT USE OF INSULIN (HCC): ICD-10-CM

## 2023-12-21 DIAGNOSIS — L97.422 ULCER OF LEFT HEEL, WITH FAT LAYER EXPOSED (HCC): Primary | ICD-10-CM

## 2023-12-21 LAB
AORTIC ROOT: 3.4 CM
AORTIC VALVE MEAN VELOCITY: 18.4 M/S
APICAL FOUR CHAMBER EJECTION FRACTION: 43 %
AV AREA BY CONTINUOUS VTI: 2 CM2
AV AREA PEAK VELOCITY: 1.9 CM2
AV LVOT MEAN GRADIENT: 2 MMHG
AV LVOT PEAK GRADIENT: 4 MMHG
AV MEAN GRADIENT: 15 MMHG
AV PEAK GRADIENT: 27 MMHG
AV VALVE AREA: 1.99 CM2
AV VELOCITY RATIO: 0.39
DOP CALC AO PEAK VEL: 2.58 M/S
DOP CALC AO VTI: 60.92 CM
DOP CALC LVOT AREA: 4.91 CM2
DOP CALC LVOT CARDIAC INDEX: 3.3 L/MIN/M2
DOP CALC LVOT CARDIAC OUTPUT: 7.06 L/MIN
DOP CALC LVOT DIAMETER: 2.5 CM
DOP CALC LVOT PEAK VEL VTI: 24.77 CM
DOP CALC LVOT PEAK VEL: 1.01 M/S
DOP CALC LVOT STROKE INDEX: 55.1 ML/M2
DOP CALC LVOT STROKE VOLUME: 121.53
FRACTIONAL SHORTENING: 19 (ref 28–44)
INTERVENTRICULAR SEPTUM IN DIASTOLE (PARASTERNAL SHORT AXIS VIEW): 1.1 CM
INTERVENTRICULAR SEPTUM: 1.1 CM (ref 0.6–1.1)
LAAS-AP2: 29.8 CM2
LAAS-AP4: 21.2 CM2
LEFT ATRIUM AREA SYSTOLE SINGLE PLANE A4C: 22.2 CM2
LEFT ATRIUM SIZE: 5.3 CM
LEFT ATRIUM VOLUME (MOD BIPLANE): 95 ML
LEFT ATRIUM VOLUME INDEX (MOD BIPLANE): 44.4 ML/M2
LEFT INTERNAL DIMENSION IN SYSTOLE: 4.6 CM (ref 2.1–4)
LEFT VENTRICLE DIASTOLIC VOLUME (MOD BIPLANE): 218 ML
LEFT VENTRICLE SYSTOLIC VOLUME (MOD BIPLANE): 134 ML
LEFT VENTRICULAR INTERNAL DIMENSION IN DIASTOLE: 5.7 CM (ref 3.5–6)
LEFT VENTRICULAR POSTERIOR WALL IN END DIASTOLE: 1.1 CM
LEFT VENTRICULAR STROKE VOLUME: 62 ML
LV EF: 39 %
LVSV (TEICH): 62 ML
RA PRESSURE ESTIMATED: 8 MMHG
RIGHT ATRIUM AREA SYSTOLE A4C: 18.5 CM2
RIGHT VENTRICLE ID DIMENSION: 4.3 CM
RV PSP: 56 MMHG
SL CV LEFT ATRIUM LENGTH A2C: 6.1 CM
SL CV LV EF: 40
SL CV PED ECHO LEFT VENTRICLE DIASTOLIC VOLUME (MOD BIPLANE) 2D: 157 ML
SL CV PED ECHO LEFT VENTRICLE SYSTOLIC VOLUME (MOD BIPLANE) 2D: 96 ML
TR MAX PG: 48 MMHG
TR PEAK VELOCITY: 3.5 M/S
TRICUSPID ANNULAR PLANE SYSTOLIC EXCURSION: 2.3 CM
TRICUSPID VALVE PEAK REGURGITATION VELOCITY: 3.45 M/S

## 2023-12-21 PROCEDURE — 99213 OFFICE O/P EST LOW 20 MIN: CPT | Performed by: INTERNAL MEDICINE

## 2023-12-21 PROCEDURE — 93321 DOPPLER ECHO F-UP/LMTD STD: CPT

## 2023-12-21 PROCEDURE — 93325 DOPPLER ECHO COLOR FLOW MAPG: CPT | Performed by: INTERNAL MEDICINE

## 2023-12-21 PROCEDURE — 93308 TTE F-UP OR LMTD: CPT

## 2023-12-21 PROCEDURE — 93321 DOPPLER ECHO F-UP/LMTD STD: CPT | Performed by: INTERNAL MEDICINE

## 2023-12-21 PROCEDURE — 99213 OFFICE O/P EST LOW 20 MIN: CPT | Performed by: PODIATRIST

## 2023-12-21 PROCEDURE — 11042 DBRDMT SUBQ TIS 1ST 20SQCM/<: CPT | Performed by: PODIATRIST

## 2023-12-21 PROCEDURE — 93308 TTE F-UP OR LMTD: CPT | Performed by: INTERNAL MEDICINE

## 2023-12-21 PROCEDURE — 93325 DOPPLER ECHO COLOR FLOW MAPG: CPT

## 2023-12-21 RX ORDER — LIDOCAINE 40 MG/G
CREAM TOPICAL ONCE
Status: COMPLETED | OUTPATIENT
Start: 2023-12-21 | End: 2023-12-21

## 2023-12-21 RX ADMIN — LIDOCAINE 1 APPLICATION: 40 CREAM TOPICAL at 09:10

## 2023-12-21 NOTE — PROGRESS NOTES
Name: Asad Galloway      : 1960      MRN: [id number]  Encounter Provider: Raj Auguste DO  Encounter Date: 2023   Encounter department: [facility] INTERNAL MEDICINE    Assessment & Plan     1. Epistaxis  Comments:  advised on vaseline on nares, humidifier use and ENT eval.  see AVS for add'l details  Orders:  -     Ambulatory Referral to Otolaryngology; Future    2. Mood disorder (HCC)  Comments:  taking depakote and overall stable today.  c/w rx and care per family    3. Anxiety associated with depression    4. Primary hypertension  Comments:  taking BB and stable, c/w rx    5. ESRD on dialysis (HCC)  Comments:  goes 3 days of the week, c/w care per nephrology    6. Pre-kidney transplant, listed           Subjective      HPI    Asad is here with his wife, he is having left sided nose bleeding lately.  blood drips at times from his nose.  He has no nose pain.  He takes effient and aspirin for CAD.  He has no bleeding now and wife is using a humidifier for at nighttime.  He is doing well otherwise and is on 4-5 lists for kidney transplant.  He is in a rush to leave, has an appt at 3pm elsewhere for heart testing.  ROS otherwise negative, no other complaints.    Review of Systems   Constitutional:  Negative for fever.   HENT:  Positive for nosebleeds. Negative for congestion and rhinorrhea.    Eyes:  Negative for visual disturbance.   Respiratory:  Negative for cough and shortness of breath.    Cardiovascular:  Negative for chest pain.   Musculoskeletal:  Negative for gait problem.   Allergic/Immunologic: Negative for immunocompromised state.   Psychiatric/Behavioral:  Negative for dysphoric mood (laughing and engaged during visit).        Current Outpatient Medications on File Prior to Visit   Medication Sig    aspirin (ECOTRIN LOW STRENGTH) 81 mg EC tablet Take 81 mg by mouth daily Resume on       atorvastatin (LIPITOR) 40 mg tablet Take 1 tablet (40 mg total) by mouth daily  with dinner    Blood Glucose Monitoring Suppl (True Metrix Meter) w/Device KIT Use to test blood sugars 3 times daily    Blood Pressure Monitoring (BLOOD PRESSURE CUFF) MISC Use to check blood pressure before taking blood pressure medication and 1 hour after and follow instructions provided in discharge instructions based on the readings.    Cholecalciferol (Vitamin D3) 1.25 MG (07801 UT) CAPS TAKE 1 CAPSULE BY MOUTH ONE TIME PER WEEK    collagenase (SANTYL) ointment Apply topically daily    divalproex sodium (DEPAKOTE SPRINKLE) 125 MG capsule TAKE 2 CAPSULES (250 MG TOTAL) BY MOUTH EVERY 12 (TWELVE) HOURS    fluticasone (FLONASE) 50 mcg/act nasal spray 1 spray into each nostril daily    gabapentin (Neurontin) 100 mg capsule Take 1 capsule (100 mg total) by mouth 2 (two) times a day    lidocaine-prilocaine (EMLA) cream Apply topically as needed for mild pain    metoprolol succinate (TOPROL-XL) 50 mg 24 hr tablet Take 1 tablet (50 mg total) by mouth 2 (two) times a day    midodrine (PROAMATINE) 2.5 mg tablet Take 1 tablet (2.5 mg total) by mouth as needed (for SBP<90 on dialysis days)    ONETOUCH DELICA LANCETS 33G MISC by Does not apply route 3 (three) times a day    prasugrel (EFFIENT) tablet Take 1 tablet (5 mg total) by mouth daily (Patient taking differently: Take 5 mg by mouth daily Takes with pudding)    sacubitril-valsartan (Entresto) 24-26 MG TABS Take 1 tablet by mouth in the morning Bedtime    Sevelamer Carbonate 2.4 g PACK VIGOROUSLY MIX CONTENTS OF 1 PACKET IN WATER (IT WILL NOT DISSOLVE) AND DRINK 3 TIMES DAILY WITH MEALS    sodium hypochlorite (DAKIN'S HALF-STRENGTH) external solution Apply 1 Application topically every other day At each dressing change    True Metrix Blood Glucose Test test strip USE 1 EACH 3 TIMES A DAY AS DIRECTED    Velphoro 500 MG CHEW Chew 1 tablet Three times a day    bisacodyl (DULCOLAX) 5 mg EC tablet Take 2 tablets (10 mg total) by mouth once for 1 dose    polyethylene glycol  (COLYTE) 4000 mL solution Take 4,000 mL by mouth once for 1 dose    polyethylene glycol (GOLYTELY) 4000 mL solution Take 4,000 mL by mouth once for 1 dose       Objective     /82 (BP Location: Left arm, Patient Position: Sitting, Cuff Size: Standard)   Pulse 84   Temp 98 °F (36.7 °C)   Wt 95.7 kg (211 lb)   SpO2 98%   BMI 30.28 kg/m²     Physical Exam  Vitals reviewed.   Constitutional:       General: He is not in acute distress.     Appearance: He is not ill-appearing.   HENT:      Head: Normocephalic and atraumatic.      Nose: No congestion or rhinorrhea.      Left Nostril: Epistaxis (dried blood on outside of nares, no active bleeding) present.   Cardiovascular:      Rate and Rhythm: Normal rate and regular rhythm.      Heart sounds: No murmur heard.  Pulmonary:      Effort: Pulmonary effort is normal.      Breath sounds: No wheezing or rales.   Musculoskeletal:      Right lower leg: No edema.      Left lower leg: No edema.   Neurological:      Mental Status: He is alert.   Psychiatric:         Mood and Affect: Mood normal.         Behavior: Behavior normal.       Raj Auguste DO

## 2023-12-21 NOTE — PATIENT INSTRUCTIONS
Vaseline  Humidifier  Return in 6 months    Nosebleed   AMBULATORY CARE:   A nosebleed , or epistaxis, occurs when one or more of the blood vessels in your nose break. You may have dark or bright red blood from one or both nostrils. A nosebleed is most commonly caused by dry air or picking your nose. A direct blow to your nose, irritation from a cold or allergies, or a foreign object can also cause a nosebleed.   Seek care immediately if:   Your nose is still bleeding after 20 minutes, even after you pinch it.     Your nasal packing is soaked with blood.    You have a foul-smelling discharge coming out of your nose.    You feel so weak and dizzy that you have trouble standing up.    You have trouble breathing or talking.    Contact your healthcare provider if:   You have a fever and are vomiting.    You have pain in and around your nose.    Your nasal pack is loose.    You have questions or concerns about your condition or care.    First aid:   Sit up and lean forward.  This will help prevent you from swallowing blood. Spit blood and saliva into a bowl.     Apply pressure to your nose.  Use 2 fingers to pinch your nose shut for 10 to 15 minutes. This will help stop the bleeding. Breathe through your mouth.     Apply ice  on the bridge of your nose to decrease swelling and bleeding. Use a cold pack or put crushed ice in a plastic bag. Cover it with a towel to protect your skin.    Pack your nose  with a cotton ball, tissue, tampon, or gauze bandage to stop the bleeding.    Treatment for a severe nosebleed  may include any of the following if the bleeding does not stop after first aid is done:  Medicines  may be applied to a small piece of cotton and placed in your nose. Medicine may also be sprayed in or applied directly to your nose. You may need medicine to prevent an infection. If bleeding is severe, medicine may be injected into a blood vessel in your nose.     Cautery  is when a chemical or electric device is  used to seal the blood vessels. This may be done to stop bleeding or prevent more bleeding. Local anesthesia may be used.    Nasal packing  is when layers of gauze are placed in your nose to provide pressure and stop the bleeding. Local anesthesia may be used. Nasal packing is usually removed in 2 to 3 days.    Embolization  is a procedure used to stop the bleeding from inside your nose. Medical glue or a small balloon device may be used to clog the blood vessels in your nose.    Surgery  may be needed if your nosebleed returns over and over long-term. An artery may be tied to stop bleeding. Damaged tissue or an abnormal structure in your nose may be repaired.    Prevent another nosebleed:   Keep your nose moist.  Put a small amount of petroleum jelly inside your nostrils as needed. Use a saline (saltwater) nasal spray. Do not put anything else inside your nose unless your healthcare provider says it is okay. Do not  use oil-based lubricants if you use oxygen therapy. They may be flammable.    Use a cool mist humidifier to increase air moisture in your home.  This will help your nose stay moist.     Do not pick or blow your nose for at least a week.  You can irritate or damage your nose if you pick it. Blowing your nose too hard may cause the bleeding to start again. Do not bend over or strain as this can cause the bleeding to start again.    Avoid irritants  such as tobacco smoke or chemical sprays such as .    Follow up with your healthcare provider as directed:  Any packing in your nose should be removed within 2 to 3 days. Write down your questions so you remember to ask them during your visits.   © Copyright [employer] 2023 Information is for End User's use only and may not be sold, redistributed or otherwise used for commercial purposes.  The above information is an  only. It is not intended as medical advice for individual conditions or treatments. Talk to your doctor, nurse or pharmacist  before following any medical regimen to see if it is safe and effective for you.

## 2023-12-21 NOTE — PROGRESS NOTES
"Patient ID: Asad Galloway is a 63 y.o. male Date of Birth 1960     Chief Complaint  Chief Complaint   Patient presents with    Follow Up Wound Care Visit     LLE wound       Allergies  Patient has no known allergies.    Assessment:    No problem-specific Assessment & Plan notes found for this encounter.       Diagnoses and all orders for this visit:    Ulcer of left heel, with fat layer exposed (HCC)  -     lidocaine (LMX) 4 % cream  -     Wound cleansing and dressings Pressure Injury Left Heel; Future  -     Wound off loading Pressure Injury Left Heel; Future  -     Debridement Pressure Injury Left Heel    Type 2 diabetes mellitus with chronic kidney disease on chronic dialysis, without long-term current use of insulin (HCC)  -     Debridement Pressure Injury Left Heel              Debridement   Wound 06/02/23 Pressure Injury Heel Left    Universal Protocol:  Consent: Verbal consent obtained.  Risks and benefits: risks, benefits and alternatives were discussed  Consent given by: patient  Time out: Immediately prior to procedure a \"time out\" was called to verify the correct patient, procedure, equipment, support staff and site/side marked as required.  Timeout called at: 12/21/2023 8:58 AM.  Patient understanding: patient states understanding of the procedure being performed  Patient identity confirmed: verbally with patient    Debridement Details  Performed by: physician  Debridement type: surgical  Level of debridement: subcutaneous tissue  Pain control: lidocaine 4%      Post-debridement measurements  Length (cm): 1.4  Width (cm): 2.5  Depth (cm): 0.3  Percent debrided: 100%  Surface Area (cm^2): 3.5  Area Debrided (cm^2): 3.5  Volume (cm^3): 1.05    Tissue and other material debrided: dermis, epidermis and subcutaneous tissue  Devitalized tissue debrided: biofilm, fibrin, necrotic debris and slough  Instrument(s) utilized: blade  Bleeding: small  Hemostasis obtained with: pressure  Procedural pain (0-10): " 0  Post-procedural pain: 0   Response to treatment: procedure was tolerated well        Plan:  1. Reviewed medical records.  Reviewed / discussed X-ray of left heel.  Patient was counseled on the condition and diagnosis.    2. Wound looks larger with increased bioburden.  Use Dakin solution daily to reduce bioburden.  Consider MRI depending on the progress.    3. Continue Globopedi shoe.   Stressed on patient compliance about proper off-loading in bed with Prevalon boot.  His ulcer would not heal without proper off-loading and patient compliance.    4.  RA in 1 week.         Wound 06/02/23 Pressure Injury Heel Left (Active)   Wound Image Images linked 12/21/23 0857   Wound Description Necrotic;Slough;Yellow;Black;Gray 12/21/23 0834   Lani-wound Assessment Intact 12/21/23 0834   Wound Length (cm) 1.3 cm 12/21/23 0834   Wound Width (cm) 2.5 cm 12/21/23 0834   Wound Depth (cm) 0.3 cm 12/21/23 0834   Wound Surface Area (cm^2) 3.25 cm^2 12/21/23 0834   Wound Volume (cm^3) 0.975 cm^3 12/21/23 0834   Calculated Wound Volume (cm^3) 0.98 cm^3 12/21/23 0834   Drainage Amount Moderate 12/21/23 0834   Drainage Description Serosanguineous 12/21/23 0834   Non-staged Wound Description Full thickness 12/21/23 0834   Patient Tolerance Tolerated well 12/21/23 0834   Dressing Status Intact 12/21/23 0834       Wound 02/01/23 Wrist Anterior;Right (Active)   Date First Assessed/Time First Assessed: 02/01/23 1806   Pre-Existing Wound: No  Location: Wrist  Wound Location Orientation: Anterior;Right       Wound 02/01/23 Skin Tear Abrasion(s) Arm Left;Posterior;Proximal (Active)   Date First Assessed/Time First Assessed: 02/01/23 1930   Pre-Existing Wound: Yes  Primary Wound Type: Skin Tear  Traumatic Wound Type: Abrasion(s)  Location: Arm  Wound Location Orientation: Left;Posterior;Proximal       Wound 06/02/23 Pressure Injury Heel Left (Active)   Date First Assessed/Time First Assessed: 06/02/23 1908   Primary Wound Type: Pressure Injury   Location: Heel  Wound Location Orientation: Left  Dressing Status: Clean;Dry;Intact       Wound 09/29/23 Groin Right (Active)   Date First Assessed/Time First Assessed: 09/29/23 0957   Location: Groin  Wound Location Orientation: Right  Wound Description (Comments): New Puncture site noted   Incision's 1st Dressing: ADHESIVE SKIN HIGH VISCOSITY EXOFIN 1ML (x1)       [REMOVED] Wound 01/29/23 Abrasion(s) Pretibial Right (Removed)   Resolved Date/Resolved Time: 12/21/23 0834  Date First Assessed/Time First Assessed: 01/29/23 1218   Traumatic Wound Type: Abrasion(s)  Location: Pretibial  Wound Location Orientation: Right  Wound Description (Comments): Scabbed  Wound Outcome: Other...       Subjective:          HPI  The patients presents for evaluation of left heel ulcer.  BS under control.  He does not wear Prevalon boot every night.         The following portions of the patient's history were reviewed and updated as appropriate: allergies, current medications, past family history, past medical history, past social history, past surgical history, and problem list.      PAST MEDICAL HISTORY:  Past Medical History:   Diagnosis Date    Cerebrovascular accident (CVA) due to thrombosis of left middle cerebral artery (HCC) 07/29/2018    Chronic kidney disease     Coronary artery disease     Diabetes mellitus (HCC)     not on meds    Dialysis patient (HCC)     M-W-F    Fistula     left upper arm for hemodialyis    GERD (gastroesophageal reflux disease)     History of coronary artery stent placement     x3    Hypercholesteremia     Hyperlipidemia     Hypertension     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    Limb alert care status     no BP/IV left arm    Neuropathy     Obesity     Osteomyelitis (HCC)     last assessed 11/4/16-per son not currently    PVC's (premature ventricular contractions)     sees  Cardio    Stroke (HCC)     last weeof July 2018 [facility]    TIA (transient ischemic attack) 10/28/2018  "   Wears dentures     Wears glasses        PAST SURGICAL HISTORY:  Past Surgical History:   Procedure Laterality Date    ABDOMINAL SURGERY      CARDIAC CATHETERIZATION N/A 05/02/2022    Procedure: Cardiac Coronary Angiogram;  Surgeon: Sam Davis MD;  [facility];  Service: Cardiology    CARDIAC CATHETERIZATION N/A 05/02/2022    Procedure: Cardiac pci;  Surgeon: Sam Davis MD;  [facility];  Service: Cardiology    CARDIAC CATHETERIZATION  02/01/2023    Procedure: Cardiac catheterization;  Surgeon: Sam Davis MD;  [facility];  Service: Cardiology    CARDIAC CATHETERIZATION N/A 02/01/2023    Procedure: Cardiac pci;  Surgeon: Sam Davis MD;  [facility];  Service: Cardiology    CARDIAC CATHETERIZATION N/A 02/01/2023    Procedure: Cardiac Coronary Angiogram;  Surgeon: Sam Davis MD;  [facility];  Service: Cardiology    CARDIAC CATHETERIZATION N/A 02/01/2023    Procedure: Cardiac other-IVUS;  Surgeon: Sam Davis MD;  [facility];  Service: Cardiology    CHOLECYSTECTOMY      Percutaneous    COLONOSCOPY      CYSTOSCOPY      IR LOWER EXTREMITY ANGIOGRAM  9/29/2023    OTHER SURGICAL HISTORY      \"stimulator to control bowel movements\"    MI ESOPHAGOGASTRODUODENOSCOPY TRANSORAL DIAGNOSTIC N/A 09/27/2016    Procedure: ESOPHAGOGASTRODUODENOSCOPY (EGD);  Surgeon: Adele Rowe MD;  Location: [facility];  Service: Gastroenterology    MI LAPAROSCOPY SURG CHOLECYSTECTOMY N/A 02/29/2016    Procedure: LAPAROSCOPIC CHOLECYSTECTOMY ;  Surgeon: Cliff Roman DO;  Location: [facility];  Service: General    MI SLCTV CATHJ 3RD+ ORD SLCTV ABDL PEL/LXTR BRNCH Left 9/29/2023    Procedure: Left leg angiogram with intervention;  Surgeon: Michelle Galvan MD;  Location[facility];  Service: Vascular    ROTATOR CUFF REPAIR Right     SPINAL CORD STIMULATOR IMPLANT      \"Medtronic interstim model # 3058- in lower back to " "control bowel movements-currently turned off-battery is dead\"    TOE AMPUTATION Right 10/28/2016    Procedure: 3RD TOE AMPUTATION ;  Surgeon: Anjel Salas DPM;  Location[facility];  Service:         ALLERGIES:  Patient has no known allergies.    MEDICATIONS:  Current Outpatient Medications   Medication Sig Dispense Refill    aspirin (ECOTRIN LOW STRENGTH) 81 mg EC tablet Take 81 mg by mouth daily Resume on 8/14        atorvastatin (LIPITOR) 40 mg tablet Take 1 tablet (40 mg total) by mouth daily with dinner 90 tablet 1    bisacodyl (DULCOLAX) 5 mg EC tablet Take 2 tablets (10 mg total) by mouth once for 1 dose 2 tablet 0    Blood Glucose Monitoring Suppl (True Metrix Meter) w/Device KIT Use to test blood sugars 3 times daily (Patient not taking: Reported on 12/5/2023) 1 kit 0    Blood Pressure Monitoring (BLOOD PRESSURE CUFF) MISC Use to check blood pressure before taking blood pressure medication and 1 hour after and follow instructions provided in discharge instructions based on the readings. 1 each 0    Cholecalciferol (Vitamin D3) 1.25 MG (55848 UT) CAPS TAKE 1 CAPSULE BY MOUTH ONE TIME PER WEEK 12 capsule 3    collagenase (SANTYL) ointment Apply topically daily 90 g 0    divalproex sodium (DEPAKOTE SPRINKLE) 125 MG capsule TAKE 2 CAPSULES (250 MG TOTAL) BY MOUTH EVERY 12 (TWELVE) HOURS 360 capsule 1    fluticasone (FLONASE) 50 mcg/act nasal spray 1 spray into each nostril daily (Patient not taking: Reported on 11/9/2023) 9.9 mL 0    gabapentin (Neurontin) 100 mg capsule Take 1 capsule (100 mg total) by mouth 2 (two) times a day 60 capsule 0    lidocaine-prilocaine (EMLA) cream Apply topically as needed for mild pain 30 g 3    metoprolol succinate (TOPROL-XL) 50 mg 24 hr tablet Take 1 tablet (50 mg total) by mouth 2 (two) times a day 180 tablet 3    midodrine (PROAMATINE) 2.5 mg tablet Take 1 tablet (2.5 mg total) by mouth as needed (for SBP<90 on dialysis days) 30 tablet 0    ONETOUCH DELICA LANCETS " 33G MISC by Does not apply route 3 (three) times a day (Patient not taking: Reported on 12/5/2023) 270 each 1    polyethylene glycol (COLYTE) 4000 mL solution Take 4,000 mL by mouth once for 1 dose 4000 mL 0    polyethylene glycol (GOLYTELY) 4000 mL solution Take 4,000 mL by mouth once for 1 dose 4000 mL 0    prasugrel (EFFIENT) tablet Take 1 tablet (5 mg total) by mouth daily (Patient taking differently: Take 5 mg by mouth daily Takes with pudding) 90 tablet 3    sacubitril-valsartan (Entresto) 24-26 MG TABS Take 1 tablet by mouth in the morning Bedtime 90 tablet 3    Sevelamer Carbonate 2.4 g PACK VIGOROUSLY MIX CONTENTS OF 1 PACKET IN WATER (IT WILL NOT DISSOLVE) AND DRINK 3 TIMES DAILY WITH MEALS      sodium hypochlorite (DAKIN'S HALF-STRENGTH) external solution Apply 1 Application topically every other day At each dressing change 473 mL 0    True Metrix Blood Glucose Test test strip USE 1 EACH 3 TIMES A DAY AS DIRECTED 300 strip 1    Velphoro 500 MG CHEW Chew 1 tablet Three times a day       No current facility-administered medications for this visit.       SOCIAL HISTORY:  Social History     Socioeconomic History    Marital status: /Civil Union     Spouse name: Not on file    Number of children: Not on file    Years of education: Not on file    Highest education level: Not asked   Occupational History    Occupation: disabled   Tobacco Use    Smoking status: Never    Smokeless tobacco: Never   Vaping Use    Vaping status: Never Used   Substance and Sexual Activity    Alcohol use: Never    Drug use: No    Sexual activity: Not on file     Comment: defer   Other Topics Concern    Not on file   Social History Narrative    Daily caffeine consumption 2-3 servings a day     Social Determinants of Health     Financial Resource Strain: Unknown (7/13/2023)    Overall Financial Resource Strain (CARDIA)     Difficulty of Paying Living Expenses: Patient declined   Food Insecurity: No Food Insecurity (2/1/2023)     Hunger Vital Sign     Worried About Running Out of Food in the Last Year: Never true     Ran Out of Food in the Last Year: Never true   Transportation Needs: No Transportation Needs (7/13/2023)    PRAPARE - Transportation     Lack of Transportation (Medical): No     Lack of Transportation (Non-Medical): No   Physical Activity: Not on file   Stress: No Stress Concern Present (1/18/2019)    [employer] - Occupational Stress Questionnaire     Feeling of Stress : Only a little   Social Connections: Unknown (1/18/2019)    Social Connection and Isolation Panel [NHANES]     Frequency of Communication with Friends and Family: Not on file     Frequency of Social Gatherings with Friends and Family: Not on file     Attends Taoism Services: Not on file     Active Member of Clubs or Organizations: Not on file     Attends Club or Organization Meetings: Not on file     Marital Status:    Intimate Partner Violence: Not At Risk (1/18/2019)    Humiliation, Afraid, Rape, and Kick questionnaire     Fear of Current or Ex-Partner: No     Emotionally Abused: No     Physically Abused: No     Sexually Abused: No   Housing Stability: Low Risk  (2/1/2023)    Housing Stability Vital Sign     Unable to Pay for Housing in the Last Year: No     Number of Places Lived in the Last Year: 1     Unstable Housing in the Last Year: No        Review of Systems   Constitutional:  Negative for chills and fever.   Respiratory:  Negative for cough and shortness of breath.    Cardiovascular:  Negative for chest pain.   Gastrointestinal:  Negative for nausea and vomiting.   Skin:  Positive for wound.   Neurological:  Positive for numbness. Negative for weakness.         Objective:       Wound 06/02/23 Pressure Injury Heel Left (Active)   Wound Image Images linked 12/21/23 0002   Wound Description Necrotic;Slough;Yellow;Black;Gray 12/21/23 0863   Lani-wound Assessment Intact 12/21/23 0821   Wound Length (cm) 1.3 cm  12/21/23 0834   Wound Width (cm) 2.5 cm 12/21/23 0834   Wound Depth (cm) 0.3 cm 12/21/23 0834   Wound Surface Area (cm^2) 3.25 cm^2 12/21/23 0834   Wound Volume (cm^3) 0.975 cm^3 12/21/23 0834   Calculated Wound Volume (cm^3) 0.98 cm^3 12/21/23 0834   Drainage Amount Moderate 12/21/23 0834   Drainage Description Serosanguineous 12/21/23 0834   Non-staged Wound Description Full thickness 12/21/23 0834   Patient Tolerance Tolerated well 12/21/23 0834   Dressing Status Intact 12/21/23 0834       /57   Pulse 70   Temp (!) 97 °F (36.1 °C)   Resp 12     Physical Exam  Vitals and nursing note reviewed.   Constitutional:       General: He is not in acute distress.     Appearance: He is not toxic-appearing or diaphoretic.   Cardiovascular:      Rate and Rhythm: Normal rate and regular rhythm.      Pulses:           Dorsalis pedis pulses are 1+ on the left side.        Posterior tibial pulses are detected w/ Doppler on the left side.   Pulmonary:      Effort: Pulmonary effort is normal. No respiratory distress.   Musculoskeletal:         General: No signs of injury.      Right foot: No foot drop.      Left foot: No foot drop.   Feet:      Comments: Biphasic PTA left.  Skin:     General: Skin is warm.      Capillary Refill: Capillary refill takes less than 2 seconds.      Coloration: Skin is not cyanotic or mottled.      Findings: No abscess or erythema.      Nails: There is no clubbing.      Comments: Ulcer noted left heel.  Wound is larger and deeper.  It is mixed with fibrous and necrotic tissues.  No signs of active infection.  Wound does not probe to bone.  No purulence.  See wound assessment and photo.   Neurological:      General: No focal deficit present.      Mental Status: He is alert and oriented to person, place, and time.      Cranial Nerves: No cranial nerve deficit.      Sensory: No sensory deficit.      Motor: No weakness.      Coordination: Coordination normal.   Psychiatric:         Mood and Affect:  Mood normal.         Behavior: Behavior normal.         Thought Content: Thought content normal.         Judgment: Judgment normal.           Wound Instructions:  Orders Placed This Encounter   Procedures    Wound cleansing and dressings Pressure Injury Left Heel     Wound location: Left heel   Change dressing daily  The dressing must stay dry and intact. You may shower with dressing protected by cast cover or bag. Or you may sponge bathe. Call wound center or home health nurse if dressing becomes wet.   Cleanse the wound with normal saline or mild soap and water, pat dry.   Apply 1/4 strength Dakin's soaken gauze to wound  Cover with Abd pad  Secure with roll gauze and tape.       This was applied today at the [facility].     Standing Status:   Future     Standing Expiration Date:   12/21/2024    Wound off loading Pressure Injury Left Heel     Keep weight and pressure off wound at all times.  Put a pillow under your leg for pressure of from your left heel when lying in bed or having your feet elevated in a chair.    Wear off-loading device as directed by your physician (Globoped shoe). Put on immediately when rising in the morning and remove when going to bed.  Avoid position directing pressure to Wound site.     Standing Status:   Future     Standing Expiration Date:   12/21/2024    Debridement Pressure Injury Left Heel     This order was created via procedure documentation        Diagnosis ICD-10-CM Associated Orders   1. Ulcer of left heel, with fat layer exposed ([facility])  L97.422 lidocaine (LMX) 4 % cream     Wound cleansing and dressings Pressure Injury Left Heel     Wound off loading Pressure Injury Left Heel     Debridement Pressure Injury Left Heel      2. Type 2 diabetes mellitus with chronic kidney disease on chronic dialysis, without long-term current use of insulin ([facility])  E11.22 Debridement Pressure Injury Left Heel    N18.6     Z99.2

## 2023-12-21 NOTE — PATIENT INSTRUCTIONS
Orders Placed This Encounter   Procedures    Wound cleansing and dressings Pressure Injury Left Heel     Wound location: Left heel   Change dressing daily  The dressing must stay dry and intact. You may shower with dressing protected by cast cover or bag. Or you may sponge bathe. Call wound center or home health nurse if dressing becomes wet.   Cleanse the wound with normal saline or mild soap and water, pat dry.   Apply 1/4 strength Dakin's soaken gauze to wound  Cover with Abd pad  Secure with roll gauze and tape.       This was applied today at the [facility].     Standing Status:   Future     Standing Expiration Date:   12/21/2024    Wound off loading Pressure Injury Left Heel     Keep weight and pressure off wound at all times.  Put a pillow under your leg for pressure of from your left heel when lying in bed or having your feet elevated in a chair.    Wear off-loading device as directed by your physician (Globoped shoe). Put on immediately when rising in the morning and remove when going to bed.  Avoid position directing pressure to Wound site.     Standing Status:   Future     Standing Expiration Date:   12/21/2024

## 2023-12-25 LAB
ALBUMIN SERPL BCP-MCNC: 4 G/DL (ref 3.5–5)
ALP SERPL-CCNC: 116 U/L (ref 34–104)
ALT SERPL W P-5'-P-CCNC: 8 U/L (ref 7–52)
ANION GAP SERPL CALCULATED.3IONS-SCNC: 14 MMOL/L
AST SERPL W P-5'-P-CCNC: 7 U/L (ref 13–39)
BILIRUB SERPL-MCNC: 0.6 MG/DL (ref 0.2–1)
BUN SERPL-MCNC: 65 MG/DL (ref 5–25)
CALCIUM SERPL-MCNC: 7.9 MG/DL (ref 8.4–10.2)
CHLORIDE SERPL-SCNC: 97 MMOL/L (ref 96–108)
CO2 SERPL-SCNC: 29 MMOL/L (ref 21–32)
CREAT SERPL-MCNC: 8.83 MG/DL (ref 0.6–1.3)
GFR SERPL CREATININE-BSD FRML MDRD: 5 ML/MIN/1.73SQ M
GLUCOSE SERPL-MCNC: 100 MG/DL (ref 65–140)
POTASSIUM SERPL-SCNC: 4.6 MMOL/L (ref 3.5–5.3)
PROT SERPL-MCNC: 7 G/DL (ref 6.4–8.4)
SODIUM SERPL-SCNC: 140 MMOL/L (ref 135–147)

## 2023-12-25 PROCEDURE — 93005 ELECTROCARDIOGRAM TRACING: CPT

## 2023-12-25 PROCEDURE — 85025 COMPLETE CBC W/AUTO DIFF WBC: CPT | Performed by: EMERGENCY MEDICINE

## 2023-12-25 PROCEDURE — 80053 COMPREHEN METABOLIC PANEL: CPT | Performed by: EMERGENCY MEDICINE

## 2023-12-25 PROCEDURE — 0241U HB NFCT DS VIR RESP RNA 4 TRGT: CPT | Performed by: EMERGENCY MEDICINE

## 2023-12-25 PROCEDURE — 83880 ASSAY OF NATRIURETIC PEPTIDE: CPT

## 2023-12-25 PROCEDURE — 99284 EMERGENCY DEPT VISIT MOD MDM: CPT

## 2023-12-25 PROCEDURE — 84484 ASSAY OF TROPONIN QUANT: CPT | Performed by: EMERGENCY MEDICINE

## 2023-12-25 PROCEDURE — 36415 COLL VENOUS BLD VENIPUNCTURE: CPT

## 2023-12-26 ENCOUNTER — APPOINTMENT (OUTPATIENT)
Dept: DIALYSIS | Facility: HOSPITAL | Age: 63
End: 2023-12-26
Payer: MEDICARE

## 2023-12-26 ENCOUNTER — APPOINTMENT (EMERGENCY)
Dept: RADIOLOGY | Facility: HOSPITAL | Age: 63
End: 2023-12-26
Payer: MEDICARE

## 2023-12-26 ENCOUNTER — HOSPITAL ENCOUNTER (OUTPATIENT)
Facility: HOSPITAL | Age: 63
Setting detail: OBSERVATION
Discharge: HOME | End: 2023-12-27
Attending: EMERGENCY MEDICINE | Admitting: INTERNAL MEDICINE
Payer: MEDICARE

## 2023-12-26 ENCOUNTER — HOSPITAL ENCOUNTER (OUTPATIENT)
Dept: VASCULAR ULTRASOUND | Facility: HOSPITAL | Age: 63
Setting detail: OBSERVATION
Discharge: HOME | End: 2023-12-26
Payer: MEDICARE

## 2023-12-26 DIAGNOSIS — Z99.2 ESRD ON DIALYSIS (HCC): ICD-10-CM

## 2023-12-26 DIAGNOSIS — R06.03 RESPIRATORY DISTRESS: ICD-10-CM

## 2023-12-26 DIAGNOSIS — R60.0 LEG EDEMA, LEFT: ICD-10-CM

## 2023-12-26 DIAGNOSIS — R79.89 ELEVATED BRAIN NATRIURETIC PEPTIDE (BNP) LEVEL: ICD-10-CM

## 2023-12-26 DIAGNOSIS — Z99.2 DIALYSIS PATIENT (HCC): ICD-10-CM

## 2023-12-26 DIAGNOSIS — N18.6 ESRD ON DIALYSIS (HCC): ICD-10-CM

## 2023-12-26 DIAGNOSIS — J21.0 RSV (ACUTE BRONCHIOLITIS DUE TO RESPIRATORY SYNCYTIAL VIRUS): Primary | ICD-10-CM

## 2023-12-26 PROBLEM — I95.9 HYPOTENSION: Status: ACTIVE | Noted: 2023-12-26

## 2023-12-26 PROBLEM — B33.8 RSV INFECTION: Status: ACTIVE | Noted: 2023-12-26

## 2023-12-26 LAB
2HR DELTA HS TROPONIN: 7 NG/L
4HR DELTA HS TROPONIN: 0 NG/L
ANISOCYTOSIS BLD QL SMEAR: PRESENT
ATRIAL RATE: 63 BPM
BASOPHILS # BLD AUTO: 0.01 THOUSANDS/ÂΜL (ref 0–0.1)
BASOPHILS NFR BLD AUTO: 0 % (ref 0–1)
BNP SERPL-MCNC: 3464 PG/ML (ref 0–100)
CARDIAC TROPONIN I PNL SERPL HS: 44 NG/L
CARDIAC TROPONIN I PNL SERPL HS: 44 NG/L
CARDIAC TROPONIN I PNL SERPL HS: 51 NG/L
EOSINOPHIL # BLD AUTO: 0.11 THOUSAND/ÂΜL (ref 0–0.61)
EOSINOPHIL NFR BLD AUTO: 3 % (ref 0–6)
ERYTHROCYTE [DISTWIDTH] IN BLOOD BY AUTOMATED COUNT: 14.9 % (ref 11.6–15.1)
FLUAV RNA RESP QL NAA+PROBE: NEGATIVE
FLUBV RNA RESP QL NAA+PROBE: NEGATIVE
HCT VFR BLD AUTO: 29.9 % (ref 36.5–49.3)
HGB BLD-MCNC: 9.2 G/DL (ref 12–17)
IMM GRANULOCYTES # BLD AUTO: 0.02 THOUSAND/UL (ref 0–0.2)
IMM GRANULOCYTES NFR BLD AUTO: 1 % (ref 0–2)
LG PLATELETS BLD QL SMEAR: PRESENT
LYMPHOCYTES # BLD AUTO: 0.66 THOUSANDS/ÂΜL (ref 0.6–4.47)
LYMPHOCYTES NFR BLD AUTO: 17 % (ref 14–44)
MCH RBC QN AUTO: 31.2 PG (ref 26.8–34.3)
MCHC RBC AUTO-ENTMCNC: 30.8 G/DL (ref 31.4–37.4)
MCV RBC AUTO: 101 FL (ref 82–98)
MONOCYTES # BLD AUTO: 0.55 THOUSAND/ÂΜL (ref 0.17–1.22)
MONOCYTES NFR BLD AUTO: 14 % (ref 4–12)
NEUTROPHILS # BLD AUTO: 2.58 THOUSANDS/ÂΜL (ref 1.85–7.62)
NEUTS SEG NFR BLD AUTO: 65 % (ref 43–75)
NRBC BLD AUTO-RTO: 0 /100 WBCS
P AXIS: 40 DEGREES
PLATELET # BLD AUTO: 99 THOUSANDS/UL (ref 149–390)
PLATELET BLD QL SMEAR: ABNORMAL
PMV BLD AUTO: 11.5 FL (ref 8.9–12.7)
PR INTERVAL: 184 MS
QRS AXIS: -59 DEGREES
QRSD INTERVAL: 154 MS
QT INTERVAL: 496 MS
QTC INTERVAL: 507 MS
RBC # BLD AUTO: 2.95 MILLION/UL (ref 3.88–5.62)
RBC MORPH BLD: PRESENT
RSV RNA RESP QL NAA+PROBE: POSITIVE
SARS-COV-2 RNA RESP QL NAA+PROBE: NEGATIVE
T WAVE AXIS: 21 DEGREES
VENTRICULAR RATE: 63 BPM
WBC # BLD AUTO: 3.93 THOUSAND/UL (ref 4.31–10.16)

## 2023-12-26 PROCEDURE — 36415 COLL VENOUS BLD VENIPUNCTURE: CPT

## 2023-12-26 PROCEDURE — 99223 1ST HOSP IP/OBS HIGH 75: CPT | Performed by: NURSE PRACTITIONER

## 2023-12-26 PROCEDURE — 71045 X-RAY EXAM CHEST 1 VIEW: CPT

## 2023-12-26 PROCEDURE — 99285 EMERGENCY DEPT VISIT HI MDM: CPT | Performed by: EMERGENCY MEDICINE

## 2023-12-26 PROCEDURE — 84484 ASSAY OF TROPONIN QUANT: CPT | Performed by: EMERGENCY MEDICINE

## 2023-12-26 PROCEDURE — 94640 AIRWAY INHALATION TREATMENT: CPT

## 2023-12-26 PROCEDURE — 99284 EMERGENCY DEPT VISIT MOD MDM: CPT | Performed by: STUDENT IN AN ORGANIZED HEALTH CARE EDUCATION/TRAINING PROGRAM

## 2023-12-26 PROCEDURE — 93971 EXTREMITY STUDY: CPT | Performed by: SURGERY

## 2023-12-26 PROCEDURE — G0257 UNSCHED DIALYSIS ESRD PT HOS: HCPCS

## 2023-12-26 PROCEDURE — 93971 EXTREMITY STUDY: CPT

## 2023-12-26 RX ORDER — PRASUGREL 10 MG/1
5 TABLET, FILM COATED ORAL DAILY
Status: DISCONTINUED | OUTPATIENT
Start: 2023-12-26 | End: 2023-12-27 | Stop reason: HOSPADM

## 2023-12-26 RX ORDER — MIDODRINE HYDROCHLORIDE 2.5 MG/1
2.5 TABLET ORAL
Status: DISCONTINUED | OUTPATIENT
Start: 2023-12-26 | End: 2023-12-26

## 2023-12-26 RX ORDER — BENZONATATE 100 MG/1
100 CAPSULE ORAL 3 TIMES DAILY PRN
Status: DISCONTINUED | OUTPATIENT
Start: 2023-12-26 | End: 2023-12-27 | Stop reason: HOSPADM

## 2023-12-26 RX ORDER — MIDODRINE HYDROCHLORIDE 2.5 MG/1
2.5 TABLET ORAL ONCE
Status: COMPLETED | OUTPATIENT
Start: 2023-12-26 | End: 2023-12-26

## 2023-12-26 RX ORDER — ECHINACEA PURPUREA EXTRACT 125 MG
2 TABLET ORAL
Status: DISCONTINUED | OUTPATIENT
Start: 2023-12-26 | End: 2023-12-27 | Stop reason: HOSPADM

## 2023-12-26 RX ORDER — ATORVASTATIN CALCIUM 40 MG/1
40 TABLET, FILM COATED ORAL
Status: DISCONTINUED | OUTPATIENT
Start: 2023-12-26 | End: 2023-12-27 | Stop reason: HOSPADM

## 2023-12-26 RX ORDER — GUAIFENESIN 600 MG/1
600 TABLET, EXTENDED RELEASE ORAL EVERY 12 HOURS SCHEDULED
Status: DISCONTINUED | OUTPATIENT
Start: 2023-12-26 | End: 2023-12-27 | Stop reason: HOSPADM

## 2023-12-26 RX ORDER — GABAPENTIN 100 MG/1
100 CAPSULE ORAL 2 TIMES DAILY
Status: DISCONTINUED | OUTPATIENT
Start: 2023-12-26 | End: 2023-12-27 | Stop reason: HOSPADM

## 2023-12-26 RX ORDER — FLUTICASONE PROPIONATE 50 MCG
1 SPRAY, SUSPENSION (ML) NASAL DAILY
Status: DISCONTINUED | OUTPATIENT
Start: 2023-12-26 | End: 2023-12-27 | Stop reason: HOSPADM

## 2023-12-26 RX ORDER — METOPROLOL SUCCINATE 50 MG/1
50 TABLET, EXTENDED RELEASE ORAL 2 TIMES DAILY
Status: DISCONTINUED | OUTPATIENT
Start: 2023-12-26 | End: 2023-12-27 | Stop reason: HOSPADM

## 2023-12-26 RX ORDER — HEPARIN SODIUM 5000 [USP'U]/ML
5000 INJECTION, SOLUTION INTRAVENOUS; SUBCUTANEOUS EVERY 8 HOURS SCHEDULED
Status: DISCONTINUED | OUTPATIENT
Start: 2023-12-26 | End: 2023-12-27 | Stop reason: HOSPADM

## 2023-12-26 RX ORDER — DIVALPROEX SODIUM 125 MG/1
250 CAPSULE, COATED PELLETS ORAL EVERY 12 HOURS SCHEDULED
Status: DISCONTINUED | OUTPATIENT
Start: 2023-12-26 | End: 2023-12-27 | Stop reason: HOSPADM

## 2023-12-26 RX ORDER — ALBUTEROL SULFATE 2.5 MG/3ML
5 SOLUTION RESPIRATORY (INHALATION) ONCE
Status: COMPLETED | OUTPATIENT
Start: 2023-12-26 | End: 2023-12-26

## 2023-12-26 RX ORDER — MIDODRINE HYDROCHLORIDE 5 MG/1
10 TABLET ORAL
Status: DISCONTINUED | OUTPATIENT
Start: 2023-12-26 | End: 2023-12-27 | Stop reason: HOSPADM

## 2023-12-26 RX ADMIN — SACUBITRIL AND VALSARTAN 1 TABLET: 24; 26 TABLET, FILM COATED ORAL at 07:51

## 2023-12-26 RX ADMIN — HEPARIN SODIUM 5000 UNITS: 5000 INJECTION INTRAVENOUS; SUBCUTANEOUS at 21:33

## 2023-12-26 RX ADMIN — GABAPENTIN 100 MG: 100 CAPSULE ORAL at 07:50

## 2023-12-26 RX ADMIN — GUAIFENESIN 600 MG: 600 TABLET ORAL at 21:33

## 2023-12-26 RX ADMIN — ATORVASTATIN CALCIUM 40 MG: 40 TABLET, FILM COATED ORAL at 18:04

## 2023-12-26 RX ADMIN — PRASUGREL 5 MG: 10 TABLET, FILM COATED ORAL at 09:29

## 2023-12-26 RX ADMIN — MIDODRINE HYDROCHLORIDE 2.5 MG: 2.5 TABLET ORAL at 07:48

## 2023-12-26 RX ADMIN — GUAIFENESIN 600 MG: 600 TABLET ORAL at 07:50

## 2023-12-26 RX ADMIN — FLUTICASONE PROPIONATE 1 SPRAY: 50 SPRAY, METERED NASAL at 07:48

## 2023-12-26 RX ADMIN — METOPROLOL SUCCINATE 50 MG: 50 TABLET, EXTENDED RELEASE ORAL at 21:38

## 2023-12-26 RX ADMIN — DIVALPROEX SODIUM 250 MG: 125 CAPSULE, COATED PELLETS ORAL at 21:34

## 2023-12-26 RX ADMIN — ALBUTEROL SULFATE 5 MG: 2.5 SOLUTION RESPIRATORY (INHALATION) at 03:27

## 2023-12-26 RX ADMIN — IPRATROPIUM BROMIDE 0.5 MG: 0.5 SOLUTION RESPIRATORY (INHALATION) at 03:28

## 2023-12-26 RX ADMIN — DIVALPROEX SODIUM 250 MG: 125 CAPSULE, COATED PELLETS ORAL at 07:49

## 2023-12-26 RX ADMIN — ASPIRIN 81 MG: 81 TABLET, COATED ORAL at 07:49

## 2023-12-26 NOTE — ED ATTENDING ATTESTATION
12/25/2023  I, Awais Machado MD, saw and evaluated the patient. I have discussed the patient with the resident/non-physician practitioner and agree with the resident's/non-physician practitioner's findings, Plan of Care, and MDM as documented in the resident's/non-physician practitioner's note, except where noted. All available labs and Radiology studies were reviewed.  I was present for key portions of any procedure(s) performed by the resident/non-physician practitioner and I was immediately available to provide assistance.       At this point I agree with the current assessment done in the Emergency Department.  I have conducted an independent evaluation of this patient a history and physical is as follows:  Patient is a 63 year old male with 2 days of worsening cough and sob. No fever. No travel. No known ill contacts. Patient is on dialysis and is due for dialysis today. Was last seen at [facility] on 12/21/23 for epistaxis. [email or web address] website checked on this patient and last Rx filled was on 11/8/23 for percocet for 7 day supply. (+) runny nose. (+) chest pressure and dizziness. Dialysis was stopped short this past Sunday. NCAT. No scleral icterus. Moist mucous membranes. Diffuse rhonci. Heart regular. Abdomen soft and nontender. No LE edema. No rash noted. DDX including but not limited to: pneumonia, pleural effusion, CHF, doubt PE; PTX, ACS, MI, asthma exacerbation, COPD exacerbation, COVID 19, RSV, influenza, anemia, metabolic abnormality, renal failure.  Doubt dissection or rhabdomyolysis. I reviewed EKG, CXR and labs. Will need admission for hypoxia.     ED Course         Critical Care Time  Procedures

## 2023-12-26 NOTE — ED PROVIDER NOTES
History  Chief Complaint   Patient presents with    Medical Problem     Pt states he has had a cough since yesterday, +runny nose, +chest pressure/ Dizziness Pt denies Fever. Pt's family member states pt had dialysis on Sunday but it was cut shot due to holiday.      63-year-old male with past medical history of diabetes, CKD on dialysis (Monday Wednesday Friday), CAD status post ADE placement x 3, hypertension, hyperlipidemia presents for evaluation of progressively worsening cough with shortness of breath x 2 to 3 days.  Patient states he was unable to finish his full dialysis 3 days ago, underwent about 1 hour of dialysis compared to 3 to 4 hours.  Denies associated symptoms of chest pain, headache, dizziness/lightheadedness, back pain, hemoptysis, abdominal pain, lower extremity edema, fever/chills or any other symptoms.  No other concerns.        Prior to Admission Medications   Prescriptions Last Dose Informant Patient Reported? Taking?   Blood Glucose Monitoring Suppl (True Metrix Meter) w/Device KIT  Child, Self No No   Sig: Use to test blood sugars 3 times daily   Blood Pressure Monitoring (BLOOD PRESSURE CUFF) MISC  Child, Self No No   Sig: Use to check blood pressure before taking blood pressure medication and 1 hour after and follow instructions provided in discharge instructions based on the readings.   Cholecalciferol (Vitamin D3) 1.25 MG (09592 UT) CAPS   No No   Sig: TAKE 1 CAPSULE BY MOUTH ONE TIME PER WEEK   ONETOUCH DELICA LANCETS 33G MISC  Child, Self No No   Sig: by Does not apply route 3 (three) times a day   Sevelamer Carbonate 2.4 g PACK  Child, Self Yes No   Sig: VIGOROUSLY MIX CONTENTS OF 1 PACKET IN WATER (IT WILL NOT DISSOLVE) AND DRINK 3 TIMES DAILY WITH MEALS   True Metrix Blood Glucose Test test strip  Self, Child No No   Sig: USE 1 EACH 3 TIMES A DAY AS DIRECTED   Velphoro 500 MG CHEW  Child, Self Yes No   Sig: Chew 1 tablet Three times a day   aspirin (ECOTRIN LOW STRENGTH) 81 mg EC  tablet  Child, Self Yes No   Sig: Take 81 mg by mouth daily Resume on      atorvastatin (LIPITOR) 40 mg tablet  Child, Self No No   Sig: Take 1 tablet (40 mg total) by mouth daily with dinner   bisacodyl (DULCOLAX) 5 mg EC tablet   No No   Sig: Take 2 tablets (10 mg total) by mouth once for 1 dose   collagenase (SANTYL) ointment  Child, Self No No   Sig: Apply topically daily   divalproex sodium (DEPAKOTE SPRINKLE) 125 MG capsule  Child, Self No No   Sig: TAKE 2 CAPSULES (250 MG TOTAL) BY MOUTH EVERY 12 (TWELVE) HOURS   fluticasone (FLONASE) 50 mcg/act nasal spray  Child, Self No No   Si spray into each nostril daily   gabapentin (Neurontin) 100 mg capsule  Child, Self No No   Sig: Take 1 capsule (100 mg total) by mouth 2 (two) times a day   lidocaine-prilocaine (EMLA) cream  Self, Child No No   Sig: Apply topically as needed for mild pain   metoprolol succinate (TOPROL-XL) 50 mg 24 hr tablet  Child, Self No No   Sig: Take 1 tablet (50 mg total) by mouth 2 (two) times a day   midodrine (PROAMATINE) 2.5 mg tablet  Child, Self No No   Sig: Take 1 tablet (2.5 mg total) by mouth as needed (for SBP<90 on dialysis days)   polyethylene glycol (COLYTE) 4000 mL solution   No No   Sig: Take 4,000 mL by mouth once for 1 dose   polyethylene glycol (GOLYTELY) 4000 mL solution   No No   Sig: Take 4,000 mL by mouth once for 1 dose   prasugrel (EFFIENT) tablet  Child, Self No No   Sig: Take 1 tablet (5 mg total) by mouth daily   Patient taking differently: Take 5 mg by mouth daily Takes with pudding   sacubitril-valsartan (Entresto) 24-26 MG TABS  Child, Self No No   Sig: Take 1 tablet by mouth in the morning Bedtime   sodium hypochlorite (DAKIN'S HALF-STRENGTH) external solution   No No   Sig: Apply 1 Application topically every other day At each dressing change      Facility-Administered Medications: None       Past Medical History:   Diagnosis Date    Cerebrovascular accident (CVA) due to thrombosis of left middle  "cerebral artery (HCC) 07/29/2018    Chronic kidney disease     Coronary artery disease     Diabetes mellitus (HCC)     not on meds    Dialysis patient ([facility])     M-W-F    Fistula     left upper arm for hemodialyis    GERD (gastroesophageal reflux disease)     History of coronary artery stent placement     x3    Hypercholesteremia     Hyperlipidemia     Hypertension     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    Limb alert care status     no BP/IV left arm    Neuropathy     Obesity     Osteomyelitis (HCC)     last assessed 11/4/16-per son not currently    PVC's (premature ventricular contractions)     sees [facility]    Stroke ([facility])     last weeof July 2018 [facility]    TIA (transient ischemic attack) 10/28/2018    Wears dentures     Wears glasses        Past Surgical History:   Procedure Laterality Date    ABDOMINAL SURGERY      CARDIAC CATHETERIZATION N/A 05/02/2022    Procedure: Cardiac Coronary Angiogram;  Surgeon: Sam Davis MD;  [facility];  Service: Cardiology    CARDIAC CATHETERIZATION N/A 05/02/2022    Procedure: Cardiac pci;  Surgeon: Sam Davis MD;  [facility];  Service: Cardiology    CARDIAC CATHETERIZATION  02/01/2023    Procedure: Cardiac catheterization;  Surgeon: Sam Davis MD;  [facility];  Service: Cardiology    CARDIAC CATHETERIZATION N/A 02/01/2023    Procedure: Cardiac pci;  Surgeon: Sam Davis MD;  [facility];  Service: Cardiology    CARDIAC CATHETERIZATION N/A 02/01/2023    Procedure: Cardiac Coronary Angiogram;  Surgeon: Sam Davis MD;  [facility];  Service: Cardiology    CARDIAC CATHETERIZATION N/A 02/01/2023    Procedure: Cardiac other-IVUS;  Surgeon: Sam Davis MD;  [facility];  Service: Cardiology    CHOLECYSTECTOMY      Percutaneous    COLONOSCOPY      CYSTOSCOPY      IR LOWER EXTREMITY ANGIOGRAM  9/29/2023    OTHER SURGICAL HISTORY      \"stimulator " "to control bowel movements\"    OK ESOPHAGOGASTRODUODENOSCOPY TRANSORAL DIAGNOSTIC N/A 09/27/2016    Procedure: ESOPHAGOGASTRODUODENOSCOPY (EGD);  Surgeon: Adele Rowe MD;  Location: [facility] GI LAB;  Service: Gastroenterology    OK LAPAROSCOPY SURG CHOLECYSTECTOMY N/A 02/29/2016    Procedure: LAPAROSCOPIC CHOLECYSTECTOMY ;  Surgeon: Cliff Roman DO;  Location: [facility];  Service: General    OK SLCTV CATHJ 3RD+ ORD SLCTV ABDL PEL/LXTR BRNCH Left 9/29/2023    Procedure: Left leg angiogram with intervention;  Surgeon: Michelle Galvan MD;  Location: [facility];  Service: Vascular    ROTATOR CUFF REPAIR Right     SPINAL CORD STIMULATOR IMPLANT      \"Medtronic interstim model # 3058- in lower back to control bowel movements-currently turned off-battery is dead\"    TOE AMPUTATION Right 10/28/2016    Procedure: 3RD TOE AMPUTATION ;  Surgeon: Anjel Salas DPM;  Location: [facility];  Service:        Family History   Problem Relation Age of Onset    Leukemia Mother     Liver disease Mother     Lung cancer Mother         heavy smoker - 3 ppd    Heart disease Father     Liver disease Father     Diabetes type I Father     Multiple myeloma Sister     Breast cancer Sister     Urolithiasis Family     Alcohol abuse Neg Hx     Depression Neg Hx     Drug abuse Neg Hx     Substance Abuse Neg Hx     Mental illness Neg Hx      I have reviewed and agree with the history as documented.    E-Cigarette/Vaping    E-Cigarette Use Never User      E-Cigarette/Vaping Substances    Nicotine No     THC No     CBD No     Flavoring No     Other No     Unknown No      Social History     Tobacco Use    Smoking status: Never    Smokeless tobacco: Never   Vaping Use    Vaping status: Never Used   Substance Use Topics    Alcohol use: Never    Drug use: No        Review of Systems   Constitutional:  Negative for chills and fever.   HENT:  Negative for ear pain and sore throat.    Eyes:  Negative for pain and visual disturbance. "   Respiratory:  Positive for cough and shortness of breath.    Cardiovascular:  Negative for chest pain and palpitations.   Gastrointestinal:  Negative for abdominal pain, diarrhea, nausea and vomiting.   Genitourinary:  Negative for dysuria and hematuria.   Musculoskeletal:  Negative for arthralgias and back pain.   Skin:  Negative for color change and rash.   Neurological:  Negative for seizures and syncope.   All other systems reviewed and are negative.      Physical Exam  ED Triage Vitals   Temperature Pulse Respirations Blood Pressure SpO2   12/25/23 2314 12/25/23 2314 12/25/23 2314 12/25/23 2314 12/25/23 2314   98.3 °F (36.8 °C) 62 18 110/53 97 %      Temp Source Heart Rate Source Patient Position - Orthostatic VS BP Location FiO2 (%)   12/25/23 2314 12/25/23 2314 12/25/23 2314 12/25/23 2314 --   Oral Monitor Sitting Right arm       Pain Score       12/26/23 0323       No Pain             Orthostatic Vital Signs  Vitals:    12/26/23 0323 12/26/23 0400 12/26/23 0553 12/26/23 0758   BP: 122/58 141/67 (!) 92/46 113/56   Pulse: 67 76 70 68   Patient Position - Orthostatic VS: Lying   Sitting       Physical Exam  Vitals and nursing note reviewed.   Constitutional:       General: He is in acute distress.      Appearance: Normal appearance. He is well-developed. He is ill-appearing. He is not toxic-appearing or diaphoretic.   HENT:      Head: Normocephalic and atraumatic.      Right Ear: External ear normal.      Left Ear: External ear normal.      Nose: Nose normal.      Mouth/Throat:      Mouth: Mucous membranes are moist.   Eyes:      Extraocular Movements: Extraocular movements intact.      Conjunctiva/sclera: Conjunctivae normal.   Cardiovascular:      Rate and Rhythm: Normal rate and regular rhythm.      Pulses: Normal pulses.      Heart sounds: Normal heart sounds. No murmur heard.  Pulmonary:      Effort: Pulmonary effort is normal. No respiratory distress.      Breath sounds: Wheezing and rhonchi present.    Abdominal:      General: Abdomen is flat.      Palpations: Abdomen is soft.      Tenderness: There is no abdominal tenderness. There is no guarding or rebound.   Musculoskeletal:         General: Swelling and tenderness present. Normal range of motion.      Cervical back: Normal range of motion and neck supple.      Left lower leg: Edema present.      Comments: Swelling over the left lower extremity.  Chronic heel pressure ulcer on the left appears clean dry and intact.   Skin:     General: Skin is warm and dry.      Capillary Refill: Capillary refill takes less than 2 seconds.   Neurological:      Mental Status: He is alert and oriented to person, place, and time. Mental status is at baseline.   Psychiatric:         Mood and Affect: Mood normal.         Behavior: Behavior normal.         ED Medications  Medications   aspirin (ECOTRIN LOW STRENGTH) EC tablet 81 mg ( Oral Canceled Entry 12/26/23 0900)   atorvastatin (LIPITOR) tablet 40 mg (has no administration in time range)   divalproex sodium (DEPAKOTE SPRINKLE) capsule 250 mg ( Oral Canceled Entry 12/26/23 0900)   fluticasone (FLONASE) 50 mcg/act nasal spray 1 spray ( Nasal Canceled Entry 12/26/23 0900)   gabapentin (NEURONTIN) capsule 100 mg ( Oral Canceled Entry 12/26/23 0900)   metoprolol succinate (TOPROL-XL) 24 hr tablet 50 mg (0 mg Oral Hold 12/26/23 0900)   prasugrel (EFFIENT) tablet 5 mg (has no administration in time range)   sacubitril-valsartan (ENTRESTO) 24-26 MG per tablet 1 tablet ( Oral Canceled Entry 12/26/23 0900)   heparin (porcine) subcutaneous injection 5,000 Units (0 Units Subcutaneous Hold 12/26/23 0703)   guaiFENesin (MUCINEX) 12 hr tablet 600 mg ( Oral Canceled Entry 12/26/23 0900)   benzonatate (TESSALON PERLES) capsule 100 mg (has no administration in time range)   sodium chloride (OCEAN) 0.65 % nasal spray 2 spray (has no administration in time range)   midodrine (PROAMATINE) tablet 10 mg (has no administration in time range)    albuterol inhalation solution 5 mg (5 mg Nebulization Given 12/26/23 0327)     And   ipratropium (ATROVENT) 0.02 % inhalation solution 0.5 mg (0.5 mg Nebulization Given 12/26/23 0328)   midodrine (PROAMATINE) tablet 2.5 mg (2.5 mg Oral Given 12/26/23 0748)       Diagnostic Studies  Results Reviewed       Procedure Component Value Units Date/Time    HS Troponin I 4hr [[id number]]  (Normal) Collected: 12/26/23 0629    Lab Status: Final result Specimen: Blood from Arm, Right Updated: 12/26/23 0702     hs TnI 4hr 44 ng/L      Delta 4hr hsTnI 0 ng/L     B-Type Natriuretic Peptide(BNP) [[id number]]  (Abnormal) Collected: 12/25/23 2325    Lab Status: Final result Specimen: Blood from Arm, Right Updated: 12/26/23 0446     BNP 3,464 pg/mL     HS Troponin I 2hr [[id number]]  (Abnormal) Collected: 12/26/23 0321    Lab Status: Final result Specimen: Blood from Arm, Right Updated: 12/26/23 0356     hs TnI 2hr 51 ng/L      Delta 2hr hsTnI 7 ng/L     FLU/RSV/COVID - if FLU/RSV clinically relevant [[id number]]  (Abnormal) Collected: 12/25/23 2325    Lab Status: Final result Specimen: Nares from Nose Updated: 12/26/23 0012     SARS-CoV-2 Negative     INFLUENZA A PCR Negative     INFLUENZA B PCR Negative     RSV PCR Positive    Narrative:      FOR PEDIATRIC PATIENTS - copy/paste COVID Guidelines URL to browser: [email or web address]    SARS-CoV-2 assay is a Nucleic Acid Amplification assay intended for the  qualitative detection of nucleic acid from SARS-CoV-2 in nasopharyngeal  swabs. Results are for the presumptive identification of SARS-CoV-2 RNA.    Positive results are indicative of infection with SARS-CoV-2, the virus  causing COVID-19, but do not rule out bacterial infection or co-infection  with other viruses. Laboratories within the [place] and its  territories are required to report all positive results to the appropriate  public health authorities. Negative results  do not preclude SARS-CoV-2  infection and should not be used as the sole basis for treatment or other  patient management decisions. Negative results must be combined with  clinical observations, patient history, and epidemiological information.  This test has not been FDA cleared or approved.    This test has been authorized by FDA under an Emergency Use Authorization  (EUA). This test is only authorized for the duration of time the  declaration that circumstances exist justifying the authorization of the  emergency use of an in vitro diagnostic tests for detection of SARS-CoV-2  virus and/or diagnosis of COVID-19 infection under section 564(b)(1) of  the Act, 21 U.S.C. 360bbb-3(b)(1), unless the authorization is terminated  or revoked sooner. The test has been validated but independent review by FDA  and CLIA is pending.    Test performed using Real Time Contentpert: This RT-PCR assay targets N2,  a region unique to SARS-CoV-2. A conserved region in the E-gene was chosen  for pan-Sarbecovirus detection which includes SARS-CoV-2.    According to CMS-2020-01-R, this platform meets the definition of high-throughput technology.    CBC and differential [[id number]]  (Abnormal) Collected: 12/25/23 2325    Lab Status: Final result Specimen: Blood from Arm, Right Updated: 12/26/23 0003     WBC 3.93 Thousand/uL      RBC 2.95 Million/uL      Hemoglobin 9.2 g/dL      Hematocrit 29.9 %       fL      MCH 31.2 pg      MCHC 30.8 g/dL      RDW 14.9 %      MPV 11.5 fL      Platelets 99 Thousands/uL      nRBC 0 /100 WBCs      Neutrophils Relative 65 %      Immat GRANS % 1 %      Lymphocytes Relative 17 %      Monocytes Relative 14 %      Eosinophils Relative 3 %      Basophils Relative 0 %      Neutrophils Absolute 2.58 Thousands/µL      Immature Grans Absolute 0.02 Thousand/uL      Lymphocytes Absolute 0.66 Thousands/µL      Monocytes Absolute 0.55 Thousand/µL      Eosinophils Absolute 0.11 Thousand/µL      Basophils Absolute  0.01 Thousands/µL     Narrative:      This is an appended report.  These results have been appended to a previously verified report.    Smear Review(Phlebs Do Not Order) [[id number]]  (Abnormal) Collected: 12/25/23 2325    Lab Status: Final result Specimen: Blood from Arm, Right Updated: 12/26/23 0003     RBC Morphology Present     Platelet Estimate Decreased     Large Platelet Present     Anisocytosis Present    HS Troponin 0hr (reflex protocol) [[id number]]  (Normal) Collected: 12/25/23 2325    Lab Status: Final result Specimen: Blood from Arm, Right Updated: 12/26/23 0000     hs TnI 0hr 44 ng/L     Comprehensive metabolic panel [[id number]]  (Abnormal) Collected: 12/25/23 2325    Lab Status: Final result Specimen: Blood from Arm, Right Updated: 12/25/23 2353     Sodium 140 mmol/L      Potassium 4.6 mmol/L      Chloride 97 mmol/L      CO2 29 mmol/L      ANION GAP 14 mmol/L      BUN 65 mg/dL      Creatinine 8.83 mg/dL      Glucose 100 mg/dL      Calcium 7.9 mg/dL      AST 7 U/L      ALT 8 U/L      Alkaline Phosphatase 116 U/L      Total Protein 7.0 g/dL      Albumin 4.0 g/dL      Total Bilirubin 0.60 mg/dL      eGFR 5 ml/min/1.73sq m     Narrative:      National Kidney Disease Foundation guidelines for Chronic Kidney Disease (CKD):     Stage 1 with normal or high GFR (GFR > 90 mL/min/1.73 square meters)    Stage 2 Mild CKD (GFR = 60-89 mL/min/1.73 square meters)    Stage 3A Moderate CKD (GFR = 45-59 mL/min/1.73 square meters)    Stage 3B Moderate CKD (GFR = 30-44 mL/min/1.73 square meters)    Stage 4 Severe CKD (GFR = 15-29 mL/min/1.73 square meters)    Stage 5 End Stage CKD (GFR <15 mL/min/1.73 square meters)  Note: GFR calculation is accurate only with a steady state creatinine                   XR chest 1 view portable   ED Interpretation by Tatyana Rene MD (12/26 0617)   Cardiomegaly, pulmonary vascular congestion      VAS lower limb venous duplex study, unilateral/limited    (Results Pending)          Procedures  ECG 12 Lead Documentation Only    Date/Time: 12/25/2023 11:20 PM    Performed by: Tatyana Rene MD  Authorized by: Tatyana Rene MD    Indications / Diagnosis:  SOB, cough  ECG reviewed by me, the ED Provider: yes    Patient location:  ED  Previous ECG:     Previous ECG:  Compared to current    Comparison ECG info:  10/28/2023    Similarity:  No change    Comparison to cardiac monitor: Yes    Interpretation:     Interpretation: normal    Rate:     ECG rate:  63    ECG rate assessment: normal    Rhythm:     Rhythm: sinus rhythm    Ectopy:     Ectopy: none    QRS:     QRS intervals:  Wide  Conduction:     Conduction: abnormal      Abnormal conduction: incomplete RBBB and bifascicular block    ST segments:     ST segments:  Non-specific  T waves:     T waves: non-specific          ED Course  ED Course as of 12/26/23 0759   Tue Dec 26, 2023   0329 Creatinine(!): 8.83   0415 RSV PCR(!): Positive   0446 BNP(!): 3,464                     PERC Rule for PE      Flowsheet Row Most Recent Value   PERC Rule for PE    Age >=50 1 Filed at: 12/26/2023 0404   HR >=100 0 Filed at: 12/26/2023 0404   O2 Sat on room air < 95% 1 Filed at: 12/26/2023 0404   History of PE or DVT 0 Filed at: 12/26/2023 0404   Recent trauma or surgery 0 Filed at: 12/26/2023 0404   Hemoptysis 0 Filed at: 12/26/2023 0404   Exogenous estrogen 0 Filed at: 12/26/2023 0404   Unilateral leg swelling 1 Filed at: 12/26/2023 0404   PERC Rule for PE Results 3 Filed at: 12/26/2023 0404                SBIRT 22yo+      Flowsheet Row Most Recent Value   Initial Alcohol Screen: US AUDIT-C     1. How often do you have a drink containing alcohol? 0 Filed at: 12/26/2023 0324   2. How many drinks containing alcohol do you have on a typical day you are drinking?  0 Filed at: 12/26/2023 0324   3a. Male UNDER 65: How often do you have five or more drinks on one occasion? 0 Filed at: 12/26/2023 0324   3b. FEMALE Any Age, or MALE 65+: How often do you have 4 or more  drinks on one occassion? 0 Filed at: 12/26/2023 0324   Audit-C Score 0 Filed at: 12/26/2023 0324   XIN: How many times in the past year have you...    Used an illegal drug or used a prescription medication for non-medical reasons? Never Filed at: 12/26/2023 0324            Wells' Criteria for PE      Flowsheet Row Most Recent Value   Wells' Criteria for PE    Clinical signs and symptoms of DVT 3 Filed at: 12/26/2023 0404   PE is primary diagnosis or equally likely 0 Filed at: 12/26/2023 0404   HR >100 0 Filed at: 12/26/2023 0404   Immobilization at least 3 days or Surgery in the previous 4 weeks 0 Filed at: 12/26/2023 0404   Previous, objectively diagnosed PE or DVT 0 Filed at: 12/26/2023 0404   Hemoptysis 0 Filed at: 12/26/2023 0404   Malignancy with treatment within 6 months or palliative 0 Filed at: 12/26/2023 0404   Wells' Criteria Total 3 Filed at: 12/26/2023 0404              Medical Decision Making  Patient remained stable throughout ED course.  However, due to findings consistent with RSV positive status in the setting of acute shortness of breath, exacerbated with minimal movement, there is concern for acute respiratory distress requiring admission and further management.  Exertional shortness of breath and respiratory distress most likely secondary to volume overload status secondary to need for dialysis however due to RSV positive status, requires further care and close observation.  Awaiting vascular duplex scan of left lower extremity due to acute edema.  Chest PE study has not been performed due to known volume overloaded status as patient would require a large IV contrast dye load.  Spoke with Slim about need for further management and workup of possible PE.  Mild elevation of troponin likely secondary to underlying abnormality of myocardium with mild strain secondary to volume overloaded status reflected by elevated BNP.  EKG revealing chronic right bundle branch block without any acute changes  concerning for right heart strain.  No evidence of acute ischemia or concerning NSTEMI/STEMI.  No evidence of bacteremia secondary to known chronic left heel wound.  Admitted to [facility] for further management. Pt understands and agreed with plan. All questions answered. No other concerns.        Amount and/or Complexity of Data Reviewed  Labs: ordered. Decision-making details documented in ED Course.  Radiology: ordered and independent interpretation performed.    Risk  Decision regarding hospitalization.          Disposition  Final diagnoses:   Elevated brain natriuretic peptide (BNP) level   RSV (acute bronchiolitis due to respiratory syncytial virus)   Respiratory distress   Dialysis patient (HCC)   Leg edema, left     Time reflects when diagnosis was documented in both MDM as applicable and the Disposition within this note       Time User Action Codes Description Comment    12/26/2023  4:58 AM Son-Mary, Tatyana Add [R79.89] Elevated brain natriuretic peptide (BNP) level     12/26/2023  4:58 AM Son-Mary, Tatyana Add [J21.0] RSV (acute bronchiolitis due to respiratory syncytial virus)     12/26/2023  4:58 AM Son-Mary, Tatyana Modify [R79.89] Elevated brain natriuretic peptide (BNP) level     12/26/2023  4:58 AM Son-Mary, Tatyana Modify [J21.0] RSV (acute bronchiolitis due to respiratory syncytial virus)     12/26/2023  4:58 AM Son-Mary, Tatyana Add [R06.03] Respiratory distress     12/26/2023  4:59 AM Son-Mary, Tatyana Add [Z99.2] Dialysis patient (HCC)     12/26/2023  4:59 AM Son-Mary, Tatyana Add [R60.0] Leg edema, left     12/26/2023  6:09 AM Aggie Gage Add [N18.6,  Z99.2] ESRD on dialysis (HCC)           ED Disposition       ED Disposition   Admit    Condition   Stable    Date/Time   Tue Dec 26, 2023 1772    Comment   Case was discussed with MICKEY AP and the patient's admission status was agreed to be Admission Status: observation status to the service of Dr. Locke .               Follow-up Information    None         Patient's  Medications   Discharge Prescriptions    No medications on file     No discharge procedures on file.    PDMP Review         Value Time User    PDMP Reviewed  Yes 12/26/2023  3:25 AM Awais Machado MD             ED Provider  Attending physically available and evaluated Asad Galloway. I managed the patient along with the ED Attending.    Electronically Signed by           Tatyana Rene MD  12/26/23 0751

## 2023-12-26 NOTE — ASSESSMENT & PLAN NOTE
BP 92/46 in ED.   Give dose of Midodrine now due to anticipation of HD today.  On Midodrine prn as an outpatient.

## 2023-12-26 NOTE — ASSESSMENT & PLAN NOTE
Presentation: Patient presents with complaints of cough, rhinorrhea, chest pressure and dizziness for the past 3 days. Patient denies fever or chills.  RSV+  Supportive care.  Mucinex BID, Tessalon prn and [place] Nasal spray prn.  Respiratory protocol.  Monitor respiratory status. Currently SpO2 92-99% on room air.

## 2023-12-26 NOTE — PLAN OF CARE
Problem: Potential for Falls  Goal: Patient will remain free of falls  Description: INTERVENTIONS:  - Educate patient/family on patient safety including physical limitations  - Instruct patient to call for assistance with activity   - Consult OT/PT to assist with strengthening/mobility   - Keep Call bell within reach  - Keep bed low and locked with side rails adjusted as appropriate  - Keep care items and personal belongings within reach  - Initiate and maintain comfort rounds  12/26/2023 0802 by Mary Ortiz RN  Outcome: Progressing  12/26/2023 0555 by Mary Ortiz RN  Outcome: Progressing     Problem: PAIN - ADULT  Goal: Verbalizes/displays adequate comfort level or baseline comfort level  Description: Interventions:  - Encourage patient to monitor pain and request assistance  - Assess pain using appropriate pain scale  - Administer analgesics based on type and severity of pain and evaluate response  - Implement non-pharmacological measures as appropriate and evaluate response  - Consider cultural and social influences on pain and pain management  - Notify physician/advanced practitioner if interventions unsuccessful or patient reports new pain  12/26/2023 0802 by Mary Ortiz RN  Outcome: Progressing  12/26/2023 0555 by Mary Ortiz RN  Outcome: Progressing     Problem: INFECTION - ADULT  Goal: Absence or prevention of progression during hospitalization  Description: INTERVENTIONS:  - Assess and monitor for signs and symptoms of infection  - Monitor lab/diagnostic results  - Monitor all insertion sites, i.e. indwelling lines, tubes, and drains  - Monitor endotracheal if appropriate and nasal secretions for changes in amount and color  - [place] appropriate cooling/warming therapies per order  - Administer medications as ordered  - Instruct and encourage patient and family to use good hand hygiene technique  - Identify and instruct in appropriate isolation precautions for identified  infection/condition  12/26/2023 0802 by Mary Ortiz RN  Outcome: Progressing  12/26/2023 0555 by Mary Ortiz RN  Outcome: Progressing     Problem: DISCHARGE PLANNING  Goal: Discharge to home or other facility with appropriate resources  Description: INTERVENTIONS:  - Identify barriers to discharge w/patient and caregiver  - Arrange for needed discharge resources and transportation as appropriate  - Identify discharge learning needs (meds, wound care, etc.)  - Arrange for interpretive services to assist at discharge as needed  - Refer to Case Management Department for coordinating discharge planning if the patient needs post-hospital services based on physician/advanced practitioner order or complex needs related to functional status, cognitive ability, or social support system  12/26/2023 0802 by Mary Ortiz RN  Outcome: Progressing  12/26/2023 0555 by Mary Ortiz RN  Outcome: Progressing     Problem: Knowledge Deficit  Goal: Patient/family/caregiver demonstrates understanding of disease process, treatment plan, medications, and discharge instructions  Description: Complete learning assessment and assess knowledge base.  Interventions:  - Provide teaching at level of understanding  - Provide teaching via preferred learning methods  12/26/2023 0802 by Mary Ortiz RN  Outcome: Progressing  12/26/2023 0555 by Mary Ortiz RN  Outcome: Progressing

## 2023-12-26 NOTE — H&P
[facility]  H&P  Name: Asad Galloway 63 y.o. male I MRN: [id number]  Unit/Bed#: ED-23 I Date of Admission: 12/26/2023   Date of Service: 12/26/2023 I Hospital Day: 0      Assessment/Plan   RSV infection  Assessment & Plan  Presentation: Patient presents with complaints of cough, rhinorrhea, chest pressure and dizziness for the past 3 days. Patient denies fever or chills.  RSV+  Supportive care.  Mucinex BID, Tessalon prn and [place] Nasal spray prn.  Respiratory protocol.  Monitor respiratory status. Currently SpO2 92-99% on room air.    Hypotension  Assessment & Plan  BP 92/46 in ED.   Give dose of Midodrine now due to anticipation of HD today.  On Midodrine prn as an outpatient.    ESRD on dialysis ([facility])  Assessment & Plan  Lab Results   Component Value Date    EGFR 5 12/25/2023    EGFR 6 11/19/2023    EGFR 10 10/28/2023    CREATININE 8.83 (H) 12/25/2023    CREATININE 7.94 (H) 11/19/2023    CREATININE 5.42 (H) 10/28/2023   Patient's wife reports patient had HD on Sunday, 12/24/2023; however, his session was only 1 hour due to HD machine not functioning properly.  HD M/W/F  Nephrology consult.    Edema of left lower extremity  Assessment & Plan  Obtain VAS duplex.    Elevated troponin  Assessment & Plan  POA troponin of 44 --> 51  History of chronic troponin elevation 2/2 ESRD and CHF    Ulcer of left heel, limited to breakdown of skin ([facility])  Assessment & Plan  Local wound care.    Anemia  Assessment & Plan  POA, hemoglobin 9.2  Baseline hemoglobin 9-10  2/2 ESRD  Trend CBC.           VTE Pharmacologic Prophylaxis: VTE Score: 4 Moderate Risk (Score 3-4) - Pharmacological DVT Prophylaxis Ordered: heparin.  Code Status: Level 1 - Full Code prior  Discussion with family: Updated  (wife) at bedside.    Anticipated Length of Stay: Patient will be admitted on an observation basis with an anticipated length of stay of less than 2 midnights secondary to RSV infection, need for  HD.    Total Time Spent on Date of Encounter in care of patient: 70 mins. This time was spent on one or more of the following: performing physical exam; counseling and coordination of care; obtaining or reviewing history; documenting in the medical record; reviewing/ordering tests, medications or procedures; communicating with other healthcare professionals and discussing with patient's family/caregivers.    Chief Complaint: Cough, rhinorrhea, chest pressure    History of Present Illness:  Asad Galloway is a 63 y.o. male with a PMH of ESRD on HD M/W/F, CAD, DM2, HLD, HTN, CVA and obesity who presents with complaints of cough, rhinorrhea, chest pressure and dizziness for the past 3 days. Patient denies fever or chills. Patient's wife reports patient had HD on Sunday, 12/24/2023; however, his session was only 1 hour due to HD machine not functioning properly.      Review of Systems:  Review of Systems   Constitutional:  Negative for chills and fever.   HENT:  Positive for congestion and rhinorrhea.    Respiratory:  Positive for cough and chest tightness. Negative for shortness of breath and wheezing.    Cardiovascular:  Negative for chest pain.   Gastrointestinal:  Negative for nausea and vomiting.   Neurological:  Positive for dizziness. Negative for light-headedness.   All other systems reviewed and are negative.      Past Medical and Surgical History:   Past Medical History:   Diagnosis Date    Cerebrovascular accident (CVA) due to thrombosis of left middle cerebral artery (HCC) 07/29/2018    Chronic kidney disease     Coronary artery disease     Diabetes mellitus (HCC)     not on meds    Dialysis patient (HCC)     M-W-F    Fistula     left upper arm for hemodialyis    GERD (gastroesophageal reflux disease)     History of coronary artery stent placement     x3    Hypercholesteremia     Hyperlipidemia     Hypertension     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    Limb alert care status     no BP/IV left arm     "Neuropathy     Obesity     Osteomyelitis (HCC)     last assessed 11/4/16-per son not currently    PVC's (premature ventricular contractions)     sees  Cardio    Stroke (HCC)     last weeof July 2018 [facility]    TIA (transient ischemic attack) 10/28/2018    Wears dentures     Wears glasses        Past Surgical History:   Procedure Laterality Date    ABDOMINAL SURGERY      CARDIAC CATHETERIZATION N/A 05/02/2022    Procedure: Cardiac Coronary Angiogram;  Surgeon: Sam Davis MD;  [facility];  Service: Cardiology    CARDIAC CATHETERIZATION N/A 05/02/2022    Procedure: Cardiac pci;  Surgeon: Sam Davis MD;  [facility];  Service: Cardiology    CARDIAC CATHETERIZATION  02/01/2023    Procedure: Cardiac catheterization;  Surgeon: Sam Davis MD;  [facility];  Service: Cardiology    CARDIAC CATHETERIZATION N/A 02/01/2023    Procedure: Cardiac pci;  Surgeon: Sam Davis MD;  [facility];  Service: Cardiology    CARDIAC CATHETERIZATION N/A 02/01/2023    Procedure: Cardiac Coronary Angiogram;  Surgeon: Sam Davis MD;  [facility];  Service: Cardiology    CARDIAC CATHETERIZATION N/A 02/01/2023    Procedure: Cardiac other-IVUS;  Surgeon: Sam Davis MD;  [facility];  Service: Cardiology    CHOLECYSTECTOMY      Percutaneous    COLONOSCOPY      CYSTOSCOPY      IR LOWER EXTREMITY ANGIOGRAM  9/29/2023    OTHER SURGICAL HISTORY      \"stimulator to control bowel movements\"    AR ESOPHAGOGASTRODUODENOSCOPY TRANSORAL DIAGNOSTIC N/A 09/27/2016    Procedure: ESOPHAGOGASTRODUODENOSCOPY (EGD);  Surgeon: Adele Rowe MD;  Location: [facility] GI [facility];  Service: Gastroenterology    AR LAPAROSCOPY SURG CHOLECYSTECTOMY N/A 02/29/2016    Procedure: LAPAROSCOPIC CHOLECYSTECTOMY ;  Surgeon: Cliff Roman DO;  Location: [facility];  Service: General    AR SLCTV CATHJ 3RD+ ORD SLCTV ABDL PEL/LXTR BRNCH Left 9/29/2023    " "Procedure: Left leg angiogram with intervention;  Surgeon: Michelle Galvan MD;  Location: [facility];  Service: Vascular    ROTATOR CUFF REPAIR Right     SPINAL CORD STIMULATOR IMPLANT      \"Medtronic interstim model # 3058- in lower back to control bowel movements-currently turned off-battery is dead\"    TOE AMPUTATION Right 10/28/2016    Procedure: 3RD TOE AMPUTATION ;  Surgeon: Anjel Salas DPM;  Location: [facility];  Service:        Meds/Allergies:  Prior to Admission medications    Medication Sig Start Date End Date Taking? Authorizing Provider   aspirin (ECOTRIN LOW STRENGTH) 81 mg EC tablet Take 81 mg by mouth daily Resume on 8/14      Historical Provider, MD   atorvastatin (LIPITOR) 40 mg tablet Take 1 tablet (40 mg total) by mouth daily with dinner 7/20/23   Raj Auguste DO   bisacodyl (DULCOLAX) 5 mg EC tablet Take 2 tablets (10 mg total) by mouth once for 1 dose 12/5/23 12/5/23  Adele Rowe MD   Blood Glucose Monitoring Suppl (True Metrix Meter) w/Device KIT Use to test blood sugars 3 times daily 11/24/23   Jaden Espino MD   Blood Pressure Monitoring (BLOOD PRESSURE CUFF) MISC Use to check blood pressure before taking blood pressure medication and 1 hour after and follow instructions provided in discharge instructions based on the readings. 8/13/18   Az Medina MD   Cholecalciferol (Vitamin D3) 1.25 MG (63843 UT) CAPS TAKE 1 CAPSULE BY MOUTH ONE TIME PER WEEK 12/14/23   Mary Torres MD   collagenase (SANTYL) ointment Apply topically daily 11/16/23   Franklin Church DPM   divalproex sodium (DEPAKOTE SPRINKLE) 125 MG capsule TAKE 2 CAPSULES (250 MG TOTAL) BY MOUTH EVERY 12 (TWELVE) HOURS 6/20/23   Raj Auguste DO   fluticasone (FLONASE) 50 mcg/act nasal spray 1 spray into each nostril daily 7/9/23   BRITTANY Alicea   gabapentin (Neurontin) 100 mg capsule Take 1 capsule (100 mg total) by mouth 2 (two) times a day 11/16/23   Franklin Church DPM   lidocaine-prilocaine (EMLA) cream Apply " topically as needed for mild pain 11/7/23   Franklin Church DPM   metoprolol succinate (TOPROL-XL) 50 mg 24 hr tablet Take 1 tablet (50 mg total) by mouth 2 (two) times a day 3/17/23   Britney Tan MD   midodrine (PROAMATINE) 2.5 mg tablet Take 1 tablet (2.5 mg total) by mouth as needed (for SBP<90 on dialysis days) 6/5/23   Bismark Adler PA-C   ONETOUCH DELICA LANCETS 33G MISC by Does not apply route 3 (three) times a day 11/27/18   Raj Auguste DO   polyethylene glycol (COLYTE) 4000 mL solution Take 4,000 mL by mouth once for 1 dose 12/6/23 12/6/23  Amanda Austin PA-C   polyethylene glycol (GOLYTELY) 4000 mL solution Take 4,000 mL by mouth once for 1 dose 12/5/23 12/5/23  Adele Rowe MD   prasugrel (EFFIENT) tablet Take 1 tablet (5 mg total) by mouth daily  Patient taking differently: Take 5 mg by mouth daily Takes with pudding 2/14/23   BRITTANY Snyder   sacubitril-valsartan (Entresto) 24-26 MG TABS Take 1 tablet by mouth in the morning Bedtime 11/21/23   Avelino Ernst MD   Sevelamer Carbonate 2.4 g PACK VIGOROUSLY MIX CONTENTS OF 1 PACKET IN WATER (IT WILL NOT DISSOLVE) AND DRINK 3 TIMES DAILY WITH MEALS 4/21/23   Historical Provider, MD   sodium hypochlorite (DAKIN'S HALF-STRENGTH) external solution Apply 1 Application topically every other day At each dressing change 12/7/23   Anay Horton DO   True Metrix Blood Glucose Test test strip USE 1 EACH 3 TIMES A DAY AS DIRECTED 11/6/23   Jaden Espino MD   Velphoro 500 MG CHEW Chew 1 tablet Three times a day 11/29/23   Historical Provider, MD     I have reviewed home medications using recent Epic encounter.    Allergies: No Known Allergies    Social History:  Marital Status: /Civil Union   Occupation: Unknown  Patient Pre-hospital Living Situation: Home, With spouse  Patient Pre-hospital Level of Mobility: walks  Patient Pre-hospital Diet Restrictions: Renal  Substance Use History:   Social History     Substance and Sexual  "Activity   Alcohol Use Never     Social History     Tobacco Use   Smoking Status Never   Smokeless Tobacco Never     Social History     Substance and Sexual Activity   Drug Use No       Family History:  Family History   Problem Relation Age of Onset    Leukemia Mother     Liver disease Mother     Lung cancer Mother         heavy smoker - 3 ppd    Heart disease Father     Liver disease Father     Diabetes type I Father     Multiple myeloma Sister     Breast cancer Sister     Urolithiasis Family     Alcohol abuse Neg Hx     Depression Neg Hx     Drug abuse Neg Hx     Substance Abuse Neg Hx     Mental illness Neg Hx        Physical Exam:     Vitals:   Blood Pressure: (!) 92/46 (12/26/23 0553)  Pulse: 70 (12/26/23 0553)  Temperature: 98.3 °F (36.8 °C) (12/25/23 2314)  Temp Source: Oral (12/25/23 2314)  Respirations: 20 (12/26/23 0323)  Height: 5' 10\" (177.8 cm) (12/26/23 0323)  Weight - Scale: 95.9 kg (211 lb 6.7 oz) (12/25/23 2317)  SpO2: 92 % (12/26/23 0553)    Physical Exam  Vitals and nursing note reviewed.   Constitutional:       General: He is not in acute distress.     Appearance: He is obese. He is not ill-appearing or diaphoretic.   HENT:      Head: Normocephalic.      Nose: Nose normal.      Mouth/Throat:      Pharynx: Oropharynx is clear.   Eyes:      General: No scleral icterus.     Conjunctiva/sclera: Conjunctivae normal.   Cardiovascular:      Rate and Rhythm: Normal rate and regular rhythm.      Pulses: Normal pulses.      Heart sounds: Normal heart sounds.   Pulmonary:      Effort: Pulmonary effort is normal. No respiratory distress.      Breath sounds: Normal breath sounds. No wheezing, rhonchi or rales.   Chest:      Chest wall: No tenderness.   Abdominal:      General: Bowel sounds are normal. There is no distension.      Palpations: Abdomen is soft.      Tenderness: There is no abdominal tenderness.   Musculoskeletal:         General: Normal range of motion.      Cervical back: Normal range of " motion.      Left lower leg: Edema present.   Skin:     General: Skin is warm and dry.      Capillary Refill: Capillary refill takes 2 to 3 seconds.   Neurological:      Mental Status: He is alert.          Additional Data:     Lab Results:  Results from last 7 days   Lab Units 12/25/23  2325   WBC Thousand/uL 3.93*   HEMOGLOBIN g/dL 9.2*   HEMATOCRIT % 29.9*   PLATELETS Thousands/uL 99*   NEUTROS PCT % 65   LYMPHS PCT % 17   MONOS PCT % 14*   EOS PCT % 3     Results from last 7 days   Lab Units 12/25/23  2325   SODIUM mmol/L 140   POTASSIUM mmol/L 4.6   CHLORIDE mmol/L 97   CO2 mmol/L 29   BUN mg/dL 65*   CREATININE mg/dL 8.83*   ANION GAP mmol/L 14   CALCIUM mg/dL 7.9*   ALBUMIN g/dL 4.0   TOTAL BILIRUBIN mg/dL 0.60   ALK PHOS U/L 116*   ALT U/L 8   AST U/L 7*   GLUCOSE RANDOM mg/dL 100                       Lines/Drains:  Invasive Devices       Peripheral Intravenous Line  Duration             Peripheral IV 12/26/23 Right;Ventral (anterior) Forearm <1 day              Line  Duration             Hemodialysis AV Fistula Left Upper arm -- days                        Imaging: Personally reviewed the following imaging: chest xray  XR chest 1 view portable   ED Interpretation by Tatyana Rene MD (12/26 0617)   Cardiomegaly, pulmonary vascular congestion      VAS lower limb venous duplex study, unilateral/limited    (Results Pending)       EKG and Other Studies Reviewed on Admission:   EKG:  NSR, RBBB, heart rate 63.    ** Please Note: This note has been constructed using a voice recognition system. **

## 2023-12-26 NOTE — CONSULTS
Consultation - Nephrology   Asad Galloway 63 y.o. male MRN: [id number]  Unit/Bed#: ED-23 Encounter: [id number]    ASSESSMENT AND PLAN:   63 y.o man with PMH ESRD on HD MWF at [facility] [place], shortness of breath, obesity, DM, CAD, CVA, GERD, previous admission with AMS p/w cough, rhinorrhea, chest pressure.  Nephrology consulted for management of ESRD    PLAN    #ESRD on HD MWF:  Dialysis unit/days: [facility] [place]  Access: AV fistula  Last dialysis Sunday, only 1 hour due to machine dysfunction  Renal Diet  Fluid restriction 1-1.5L/d  Adjust medications to GFR<10  Avoid opioids plan for hemodialysis today and regular dialysis tomorrow    #Volume status/hypertension:  Volume: Euvolemic  Blood pressure: Normotensive, /56  Midodrine on dialysis as needed  On metoprolol   Entresto      #Secondary Hyperparasitoidism   Low phosphorus diet    # Anemia of Kidney Disease  Current hemoglobin: 9.2 mg/dL  Treatment:  No EPO due to history of CVA  Transfuse for hemoglobin less than 7.0 per primary service      # RSV  On room air  Complains of cough no rhinorrhea  Stable      HISTORY OF PRESENT ILLNESS:  Requesting Physician: Randolph Walter DO  Reason for Consult: Management of ESRD    Asad Galloway is a 63 y.o.  male with PMH of ESRD on HD MWF at [facility] [place], shortness of breath, obesity, DM, CAD, CVA, GERD, previous admission with AMS who was admitted to [facility] after presenting with cough, rhinorrhea, chest pressure. A renal consultation is requested today for assistance in the management of ESRD.    PAST MEDICAL HISTORY:  Past Medical History:   Diagnosis Date    Cerebrovascular accident (CVA) due to thrombosis of left middle cerebral artery (HCC) 07/29/2018    Chronic kidney disease     Coronary artery disease     Diabetes mellitus (HCC)     not on meds    Dialysis patient (HCC)     M-W-F    Fistula     left upper arm for hemodialyis    GERD (gastroesophageal reflux disease)     History of coronary artery stent placement     x3     "Hypercholesteremia     Hyperlipidemia     Hypertension     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    Limb alert care status     no BP/IV left arm    Neuropathy     Obesity     Osteomyelitis (HCC)     last assessed 11/4/16-per son not currently    PVC's (premature ventricular contractions)     sees  Cardio    Stroke (HCC)     last weeof July 2018 [facility]    TIA (transient ischemic attack) 10/28/2018    Wears dentures     Wears glasses        PAST SURGICAL HISTORY:  Past Surgical History:   Procedure Laterality Date    ABDOMINAL SURGERY      CARDIAC CATHETERIZATION N/A 05/02/2022    Procedure: Cardiac Coronary Angiogram;  Surgeon: Sam Davis MD;  [facility];  Service: Cardiology    CARDIAC CATHETERIZATION N/A 05/02/2022    Procedure: Cardiac pci;  Surgeon: Sam Davis MD;  [facility];  Service: Cardiology    CARDIAC CATHETERIZATION  02/01/2023    Procedure: Cardiac catheterization;  Surgeon: Sam Davis MD;  [facility]: [facility];  Service: Cardiology    CARDIAC CATHETERIZATION N/A 02/01/2023    Procedure: Cardiac pci;  Surgeon: Sam Davis MD;  [facility]: [facility];  Service: Cardiology    CARDIAC CATHETERIZATION N/A 02/01/2023    Procedure: Cardiac Coronary Angiogram;  Surgeon: Sam Davis MD;  Location: [facility];  Service: Cardiology    CARDIAC CATHETERIZATION N/A 02/01/2023    Procedure: Cardiac other-IVUS;  Surgeon: Sam Davis MD;  Location: [facility];  Service: Cardiology    CHOLECYSTECTOMY      Percutaneous    COLONOSCOPY      CYSTOSCOPY      IR LOWER EXTREMITY ANGIOGRAM  9/29/2023    OTHER SURGICAL HISTORY      \"stimulator to control bowel movements\"    AZ ESOPHAGOGASTRODUODENOSCOPY TRANSORAL DIAGNOSTIC N/A 09/27/2016    Procedure: ESOPHAGOGASTRODUODENOSCOPY (EGD);  Surgeon: Adele Rowe MD;  Location: [facility] GI LAB;  Service: Gastroenterology    AZ LAPAROSCOPY SURG CHOLECYSTECTOMY N/A 02/29/2016    " "Procedure: LAPAROSCOPIC CHOLECYSTECTOMY ;  Surgeon: Cliff Roman DO;  Location: [facility];  Service: General    WA SLCTV CATHJ 3RD+ ORD SLCTV ABDL PEL/LXTR BRNCH Left 9/29/2023    Procedure: Left leg angiogram with intervention;  Surgeon: Michelle Galvan MD;  Location: [facility];  Service: Vascular    ROTATOR CUFF REPAIR Right     SPINAL CORD STIMULATOR IMPLANT      \"Medtronic interstim model # 3058- in lower back to control bowel movements-currently turned off-battery is dead\"    TOE AMPUTATION Right 10/28/2016    Procedure: 3RD TOE AMPUTATION ;  Surgeon: Anjel Salas DPM;  Location: [facility];  Service:        ALLERGIES:  No Known Allergies    SOCIAL HISTORY:  Social History     Substance and Sexual Activity   Alcohol Use Never     Social History     Substance and Sexual Activity   Drug Use No     Social History     Tobacco Use   Smoking Status Never   Smokeless Tobacco Never       FAMILY HISTORY:  Family History   Problem Relation Age of Onset    Leukemia Mother     Liver disease Mother     Lung cancer Mother         heavy smoker - 3 ppd    Heart disease Father     Liver disease Father     Diabetes type I Father     Multiple myeloma Sister     Breast cancer Sister     Urolithiasis Family     Alcohol abuse Neg Hx     Depression Neg Hx     Drug abuse Neg Hx     Substance Abuse Neg Hx     Mental illness Neg Hx        MEDICATIONS:    Current Facility-Administered Medications:     aspirin (ECOTRIN LOW STRENGTH) EC tablet 81 mg, 81 mg, Oral, Daily, Aggie Too, CRNP, 81 mg at 12/26/23 0749    atorvastatin (LIPITOR) tablet 40 mg, 40 mg, Oral, Daily With Dinner, Aggie McGeehan, CRNP    benzonatate (TESSALON PERLES) capsule 100 mg, 100 mg, Oral, TID PRN, Aggie Winghan, CRNP    divalproex sodium (DEPAKOTE SPRINKLE) capsule 250 mg, 250 mg, Oral, Q12H MICH, Aggie McGeehan, CRNP, 250 mg at 12/26/23 0749    fluticasone (FLONASE) 50 mcg/act nasal spray 1 spray, 1 spray, Nasal, Daily, Aggie " BRITTANY Gage, 1 spray at 12/26/23 0748    gabapentin (NEURONTIN) capsule 100 mg, 100 mg, Oral, BID, Aggie Too, LESLINP, 100 mg at 12/26/23 0750    guaiFENesin (MUCINEX) 12 hr tablet 600 mg, 600 mg, Oral, Q12H MICH, Aggie Too, CRNP, 600 mg at 12/26/23 0750    heparin (porcine) subcutaneous injection 5,000 Units, 5,000 Units, Subcutaneous, Q8H MICH, BRITTANY Valderrama    metoprolol succinate (TOPROL-XL) 24 hr tablet 50 mg, 50 mg, Oral, BID, BRITTANY Valderrama    midodrine (PROAMATINE) tablet 10 mg, 10 mg, Oral, Before Dialysis, Joselyn Reyes Bahamonde, MD    prasugrel (EFFIENT) tablet 5 mg, 5 mg, Oral, Daily, Aggie Gage, BRITTANY, 5 mg at 12/26/23 0929    sacubitril-valsartan (ENTRESTO) 24-26 MG per tablet 1 tablet, 1 tablet, Oral, Daily, Aggie Too, LESLINP, 1 tablet at 12/26/23 0751    sodium chloride (OCEAN) 0.65 % nasal spray 2 spray, 2 spray, Each Nare, Q1H PRN, BRITTANY Valderrama    Current Outpatient Medications:     aspirin (ECOTRIN LOW STRENGTH) 81 mg EC tablet, Take 81 mg by mouth daily Resume on 8/14  , Disp: , Rfl:     atorvastatin (LIPITOR) 40 mg tablet, Take 1 tablet (40 mg total) by mouth daily with dinner, Disp: 90 tablet, Rfl: 1    bisacodyl (DULCOLAX) 5 mg EC tablet, Take 2 tablets (10 mg total) by mouth once for 1 dose, Disp: 2 tablet, Rfl: 0    Blood Glucose Monitoring Suppl (True Metrix Meter) w/Device KIT, Use to test blood sugars 3 times daily, Disp: 1 kit, Rfl: 0    Blood Pressure Monitoring (BLOOD PRESSURE CUFF) MISC, Use to check blood pressure before taking blood pressure medication and 1 hour after and follow instructions provided in discharge instructions based on the readings., Disp: 1 each, Rfl: 0    Cholecalciferol (Vitamin D3) 1.25 MG (16227 UT) CAPS, TAKE 1 CAPSULE BY MOUTH ONE TIME PER WEEK, Disp: 12 capsule, Rfl: 3    collagenase (SANTYL) ointment, Apply topically daily, Disp: 90 g, Rfl: 0    divalproex sodium (DEPAKOTE SPRINKLE) 125 MG capsule, TAKE 2  CAPSULES (250 MG TOTAL) BY MOUTH EVERY 12 (TWELVE) HOURS, Disp: 360 capsule, Rfl: 1    fluticasone (FLONASE) 50 mcg/act nasal spray, 1 spray into each nostril daily, Disp: 9.9 mL, Rfl: 0    gabapentin (Neurontin) 100 mg capsule, Take 1 capsule (100 mg total) by mouth 2 (two) times a day, Disp: 60 capsule, Rfl: 0    lidocaine-prilocaine (EMLA) cream, Apply topically as needed for mild pain, Disp: 30 g, Rfl: 3    metoprolol succinate (TOPROL-XL) 50 mg 24 hr tablet, Take 1 tablet (50 mg total) by mouth 2 (two) times a day, Disp: 180 tablet, Rfl: 3    midodrine (PROAMATINE) 2.5 mg tablet, Take 1 tablet (2.5 mg total) by mouth as needed (for SBP<90 on dialysis days), Disp: 30 tablet, Rfl: 0    ONETOUCH DELICA LANCETS 33G MISC, by Does not apply route 3 (three) times a day, Disp: 270 each, Rfl: 1    polyethylene glycol (COLYTE) 4000 mL solution, Take 4,000 mL by mouth once for 1 dose, Disp: 4000 mL, Rfl: 0    polyethylene glycol (GOLYTELY) 4000 mL solution, Take 4,000 mL by mouth once for 1 dose, Disp: 4000 mL, Rfl: 0    prasugrel (EFFIENT) tablet, Take 1 tablet (5 mg total) by mouth daily (Patient taking differently: Take 5 mg by mouth daily Takes with pudding), Disp: 90 tablet, Rfl: 3    sacubitril-valsartan (Entresto) 24-26 MG TABS, Take 1 tablet by mouth in the morning Bedtime, Disp: 90 tablet, Rfl: 3    Sevelamer Carbonate 2.4 g PACK, VIGOROUSLY MIX CONTENTS OF 1 PACKET IN WATER (IT WILL NOT DISSOLVE) AND DRINK 3 TIMES DAILY WITH MEALS, Disp: , Rfl:     sodium hypochlorite (DAKIN'S HALF-STRENGTH) external solution, Apply 1 Application topically every other day At each dressing change, Disp: 473 mL, Rfl: 0    True Metrix Blood Glucose Test test strip, USE 1 EACH 3 TIMES A DAY AS DIRECTED, Disp: 300 strip, Rfl: 1    Velphoro 500 MG CHEW, Chew 1 tablet Three times a day, Disp: , Rfl:     REVIEW OF SYSTEMS:  Complete 10 point review of systems were obtained and discussed in length with the patient. Complete review of  systems were negative / unremarkable except mentioned in the HPI section.     Review of Systems - Psychological ROS: negative  Ophthalmic ROS: negative  ENT ROS: negative  Hematological and Lymphatic ROS: negative  Endocrine ROS: negative  Respiratory ROS: cough, no shortness of breath, or wheezing  Cardiovascular ROS: no chest pain or dyspnea on exertion  Gastrointestinal ROS: no abdominal pain, change in bowel habits, or black or bloody stools  Genito-Urinary ROS: no dysuria, trouble voiding, or hematuria  Musculoskeletal ROS: negative  Neurological ROS: Left facial paresis   Dermatological ROS: negative     PHYSICAL EXAM:  Current Weight: Weight - Scale: 95.9 kg (211 lb 6.7 oz)  First Weight: Weight - Scale: 95.9 kg (211 lb 6.7 oz)  Vitals:    12/26/23 0800   BP: 113/56   Pulse: 67   Resp:    Temp:    SpO2: 96%     No intake or output data in the 24 hours ending 12/26/23 0938  Wt Readings from Last 3 Encounters:   12/25/23 95.9 kg (211 lb 6.7 oz)   12/21/23 95.7 kg (210 lb 15.7 oz)   12/21/23 95.7 kg (211 lb)     Temp Readings from Last 3 Encounters:   12/25/23 98.3 °F (36.8 °C) (Oral)   12/21/23 98 °F (36.7 °C)   12/21/23 (!) 97 °F (36.1 °C)     BP Readings from Last 3 Encounters:   12/26/23 113/56   12/21/23 122/82   12/21/23 122/82     Pulse Readings from Last 3 Encounters:   12/26/23 67   12/21/23 62   12/21/23 84        General:  no acute distress at this time  Skin:  No acute rash  Eyes:  No scleral icterus and noninjected  ENT:  mucous membranes moist  Neck:  no carotid bruits  Chest:  Clear to auscultation percussion, good respiratory effort, no use of accessory respiratory muscles  CVS:  Regular rate and rhythm without rub   Abdomen:  soft and nontender   Extremities: no significant lower extremity edema  Neuro:  No gross focality  Psych:  Alert , cooperative   Dialysis access: Left aneurysmatic fistula with good bruit and thrill left facial      Invasive Devices:      Lab Results:   Results from last 7  "days   Lab Units 12/25/23  2325   WBC Thousand/uL 3.93*   HEMOGLOBIN g/dL 9.2*   HEMATOCRIT % 29.9*   PLATELETS Thousands/uL 99*   POTASSIUM mmol/L 4.6   CHLORIDE mmol/L 97   CO2 mmol/L 29   BUN mg/dL 65*   CREATININE mg/dL 8.83*   CALCIUM mg/dL 7.9*       Other Studies:   XR chest 1 view portable   ED Interpretation by Tatyana Rene MD (12/26 0617)   Cardiomegaly, pulmonary vascular congestion      VAS lower limb venous duplex study, unilateral/limited    (Results Pending)       Portions of the record may have been created with voice recognition software. Occasional wrong word or \"sound a like\" substitutions may have occurred due to the inherent limitations of voice recognition software. Read the chart carefully and recognize, using context, where substitutions have occurred.    "

## 2023-12-26 NOTE — ED NOTES
Pt's wife, at bedside. Wife asking to go over medications on pt's chart. Pt's wife notified RN that pt no longer takes gabapentin and would like that discontinued. Pt's wife asking for pt to have cough medicine and notified RN that pt cannot take pills whole with water. Pt's wife requesting PRN cough medicine be changed to liquid form.   RN notified SLIM (Randolph Walter, ), of request to change cough medicine and gabapentin order.      Chapis Acosta, THIERNO  12/26/23 6348

## 2023-12-26 NOTE — PLAN OF CARE
Problem: Potential for Falls  Goal: Patient will remain free of falls  Description: INTERVENTIONS:  - Educate patient/family on patient safety including physical limitations  - Instruct patient to call for assistance with activity   - Consult OT/PT to assist with strengthening/mobility   - Keep Call bell within reach  - Keep bed low and locked with side rails adjusted as appropriate  - Keep care items and personal belongings within reach  - Initiate and maintain comfort rounds  Outcome: Progressing     Problem: PAIN - ADULT  Goal: Verbalizes/displays adequate comfort level or baseline comfort level  Description: Interventions:  - Encourage patient to monitor pain and request assistance  - Assess pain using appropriate pain scale  - Administer analgesics based on type and severity of pain and evaluate response  - Implement non-pharmacological measures as appropriate and evaluate response  - Consider cultural and social influences on pain and pain management  - Notify physician/advanced practitioner if interventions unsuccessful or patient reports new pain  Outcome: Progressing     Problem: INFECTION - ADULT  Goal: Absence or prevention of progression during hospitalization  Description: INTERVENTIONS:  - Assess and monitor for signs and symptoms of infection  - Monitor lab/diagnostic results  - Monitor all insertion sites, i.e. indwelling lines, tubes, and drains  - Monitor endotracheal if appropriate and nasal secretions for changes in amount and color  - [place] appropriate cooling/warming therapies per order  - Administer medications as ordered  - Instruct and encourage patient and family to use good hand hygiene technique  - Identify and instruct in appropriate isolation precautions for identified infection/condition  Outcome: Progressing     Problem: DISCHARGE PLANNING  Goal: Discharge to home or other facility with appropriate resources  Description: INTERVENTIONS:  - Identify barriers to discharge  w/patient and caregiver  - Arrange for needed discharge resources and transportation as appropriate  - Identify discharge learning needs (meds, wound care, etc.)  - Arrange for interpretive services to assist at discharge as needed  - Refer to Case Management Department for coordinating discharge planning if the patient needs post-hospital services based on physician/advanced practitioner order or complex needs related to functional status, cognitive ability, or social support system  Outcome: Progressing     Problem: Knowledge Deficit  Goal: Patient/family/caregiver demonstrates understanding of disease process, treatment plan, medications, and discharge instructions  Description: Complete learning assessment and assess knowledge base.  Interventions:  - Provide teaching at level of understanding  - Provide teaching via preferred learning methods  Outcome: Progressing

## 2023-12-26 NOTE — PLAN OF CARE
Post-Dialysis RN Treatment Note    Blood Pressure:  Pre 118/56 mm/Hg  Post 112/57 mmHg   EDW  95.5 kg    Weight:  Pre 97.0 kg   Post 95.0 kg   Mode of weight measurement: Standing Scale   Volume Removed  2000 ml    Treatment duration 150 minutes    NS given  No    Treatment shortened? No   Medications given during Rx Not Applicable   Estimated Kt/V  Not Applicable   Access type: AV fistula   Access Issues: No    Report called to primary nurse   Yes / ED          2000 ml as tolerated, 2.5 hrs, 2K  Problem: METABOLIC, FLUID AND ELECTROLYTES - ADULT  Goal: Electrolytes maintained within normal limits  Description: INTERVENTIONS:  - Monitor labs and assess patient for signs and symptoms of electrolyte imbalances  - Administer electrolyte replacement as ordered  - Monitor response to electrolyte replacements, including repeat lab results as appropriate  - Instruct patient on fluid and nutrition as appropriate  Outcome: Progressing  Goal: Fluid balance maintained  Description: INTERVENTIONS:  - Monitor labs   - Monitor I/O and WT  - Instruct patient on fluid and nutrition as appropriate  - Assess for signs & symptoms of volume excess or deficit  Outcome: Progressing

## 2023-12-26 NOTE — ASSESSMENT & PLAN NOTE
Lab Results   Component Value Date    EGFR 5 12/25/2023    EGFR 6 11/19/2023    EGFR 10 10/28/2023    CREATININE 8.83 (H) 12/25/2023    CREATININE 7.94 (H) 11/19/2023    CREATININE 5.42 (H) 10/28/2023   Patient's wife reports patient had HD on Sunday, 12/24/2023; however, his session was only 1 hour due to HD machine not functioning properly.  HD M/W/F  Nephrology consult.

## 2023-12-27 ENCOUNTER — APPOINTMENT (OUTPATIENT)
Dept: DIALYSIS | Facility: HOSPITAL | Age: 63
End: 2023-12-27
Payer: MEDICARE

## 2023-12-27 VITALS
OXYGEN SATURATION: 93 % | DIASTOLIC BLOOD PRESSURE: 69 MMHG | RESPIRATION RATE: 16 BRPM | BODY MASS INDEX: 30.27 KG/M2 | HEIGHT: 70 IN | TEMPERATURE: 98.4 F | WEIGHT: 211.42 LBS | SYSTOLIC BLOOD PRESSURE: 130 MMHG | HEART RATE: 70 BPM

## 2023-12-27 LAB
ALBUMIN SERPL BCP-MCNC: 3.8 G/DL (ref 3.5–5)
ALP SERPL-CCNC: 90 U/L (ref 34–104)
ALT SERPL W P-5'-P-CCNC: 6 U/L (ref 7–52)
ANION GAP SERPL CALCULATED.3IONS-SCNC: 13 MMOL/L
ANISOCYTOSIS BLD QL SMEAR: PRESENT
AST SERPL W P-5'-P-CCNC: 6 U/L (ref 13–39)
BASOPHILS # BLD AUTO: 0.01 THOUSANDS/ÂΜL (ref 0–0.1)
BASOPHILS NFR BLD AUTO: 0 % (ref 0–1)
BILIRUB SERPL-MCNC: 0.64 MG/DL (ref 0.2–1)
BUN SERPL-MCNC: 51 MG/DL (ref 5–25)
CALCIUM SERPL-MCNC: 7.9 MG/DL (ref 8.4–10.2)
CHLORIDE SERPL-SCNC: 97 MMOL/L (ref 96–108)
CO2 SERPL-SCNC: 26 MMOL/L (ref 21–32)
CREAT SERPL-MCNC: 7.82 MG/DL (ref 0.6–1.3)
EOSINOPHIL # BLD AUTO: 0.1 THOUSAND/ÂΜL (ref 0–0.61)
EOSINOPHIL NFR BLD AUTO: 2 % (ref 0–6)
ERYTHROCYTE [DISTWIDTH] IN BLOOD BY AUTOMATED COUNT: 15.2 % (ref 11.6–15.1)
GFR SERPL CREATININE-BSD FRML MDRD: 6 ML/MIN/1.73SQ M
GLUCOSE SERPL-MCNC: 77 MG/DL (ref 65–140)
HCT VFR BLD AUTO: 30.3 % (ref 36.5–49.3)
HGB BLD-MCNC: 9.3 G/DL (ref 12–17)
IMM GRANULOCYTES # BLD AUTO: 0.03 THOUSAND/UL (ref 0–0.2)
IMM GRANULOCYTES NFR BLD AUTO: 1 % (ref 0–2)
LG PLATELETS BLD QL SMEAR: PRESENT
LYMPHOCYTES # BLD AUTO: 0.59 THOUSANDS/ÂΜL (ref 0.6–4.47)
LYMPHOCYTES NFR BLD AUTO: 13 % (ref 14–44)
MCH RBC QN AUTO: 30.9 PG (ref 26.8–34.3)
MCHC RBC AUTO-ENTMCNC: 30.7 G/DL (ref 31.4–37.4)
MCV RBC AUTO: 101 FL (ref 82–98)
MONOCYTES # BLD AUTO: 0.61 THOUSAND/ÂΜL (ref 0.17–1.22)
MONOCYTES NFR BLD AUTO: 13 % (ref 4–12)
NEUTROPHILS # BLD AUTO: 3.24 THOUSANDS/ÂΜL (ref 1.85–7.62)
NEUTS SEG NFR BLD AUTO: 71 % (ref 43–75)
NRBC BLD AUTO-RTO: 0 /100 WBCS
PLATELET # BLD AUTO: 92 THOUSANDS/UL (ref 149–390)
PLATELET BLD QL SMEAR: ABNORMAL
PMV BLD AUTO: 11.7 FL (ref 8.9–12.7)
POTASSIUM SERPL-SCNC: 4.8 MMOL/L (ref 3.5–5.3)
PROT SERPL-MCNC: 6.7 G/DL (ref 6.4–8.4)
RBC # BLD AUTO: 3.01 MILLION/UL (ref 3.88–5.62)
RBC MORPH BLD: PRESENT
SODIUM SERPL-SCNC: 136 MMOL/L (ref 135–147)
WBC # BLD AUTO: 4.58 THOUSAND/UL (ref 4.31–10.16)

## 2023-12-27 PROCEDURE — 99239 HOSP IP/OBS DSCHRG MGMT >30: CPT | Performed by: STUDENT IN AN ORGANIZED HEALTH CARE EDUCATION/TRAINING PROGRAM

## 2023-12-27 PROCEDURE — 90935 HEMODIALYSIS ONE EVALUATION: CPT | Performed by: STUDENT IN AN ORGANIZED HEALTH CARE EDUCATION/TRAINING PROGRAM

## 2023-12-27 PROCEDURE — G0257 UNSCHED DIALYSIS ESRD PT HOS: HCPCS

## 2023-12-27 PROCEDURE — 80053 COMPREHEN METABOLIC PANEL: CPT | Performed by: NURSE PRACTITIONER

## 2023-12-27 PROCEDURE — 85025 COMPLETE CBC W/AUTO DIFF WBC: CPT | Performed by: NURSE PRACTITIONER

## 2023-12-27 RX ADMIN — HEPARIN SODIUM 5000 UNITS: 5000 INJECTION INTRAVENOUS; SUBCUTANEOUS at 05:14

## 2023-12-27 RX ADMIN — METOPROLOL SUCCINATE 50 MG: 50 TABLET, EXTENDED RELEASE ORAL at 12:53

## 2023-12-27 RX ADMIN — FLUTICASONE PROPIONATE 1 SPRAY: 50 SPRAY, METERED NASAL at 12:53

## 2023-12-27 RX ADMIN — MIDODRINE HYDROCHLORIDE 10 MG: 5 TABLET ORAL at 09:17

## 2023-12-27 RX ADMIN — PRASUGREL 5 MG: 10 TABLET, FILM COATED ORAL at 12:54

## 2023-12-27 RX ADMIN — BENZONATATE 100 MG: 100 CAPSULE ORAL at 09:17

## 2023-12-27 RX ADMIN — SACUBITRIL AND VALSARTAN 1 TABLET: 24; 26 TABLET, FILM COATED ORAL at 12:53

## 2023-12-27 RX ADMIN — GUAIFENESIN 600 MG: 600 TABLET ORAL at 12:53

## 2023-12-27 RX ADMIN — ASPIRIN 81 MG: 81 TABLET, COATED ORAL at 12:53

## 2023-12-27 RX ADMIN — DIVALPROEX SODIUM 250 MG: 125 CAPSULE, COATED PELLETS ORAL at 12:54

## 2023-12-27 NOTE — HEMODIALYSIS
Post-Dialysis RN Treatment Note    Blood Pressure:  Pre 111/60 mm/Hg  Post 130/69 mmHg   EDW  95 kg    Weight:  Pre 95 kg   Post 94.5 kg   Mode of weight measurement: Standing Scale   Volume Removed  500 ml    Treatment duration 210 minutes    NS given  Yes, 100 ml    Treatment shortened? No   Medications given during Rx Midodrine 10 mg   Estimated Kt/V  None Reported   Access type: AV fistula   Access Issues: No    Report called to primary nurse   Yes Amaury KATE RN

## 2023-12-27 NOTE — ASSESSMENT & PLAN NOTE
Lab Results   Component Value Date    EGFR 6 12/27/2023    EGFR 5 12/25/2023    EGFR 6 11/19/2023    CREATININE 7.82 (H) 12/27/2023    CREATININE 8.83 (H) 12/25/2023    CREATININE 7.94 (H) 11/19/2023   Patient's wife reports patient had HD on Sunday, 12/24/2023; however, his session was only 1 hour due to HD machine not functioning properly.  HD M/W/F  Nephrology consult. Had HD 12/27

## 2023-12-27 NOTE — PLAN OF CARE
Problem: Potential for Falls  Goal: Patient will remain free of falls  Description: INTERVENTIONS:  - Educate patient/family on patient safety including physical limitations  - Instruct patient to call for assistance with activity   - Consult OT/PT to assist with strengthening/mobility   - Keep Call bell within reach  - Keep bed low and locked with side rails adjusted as appropriate  - Keep care items and personal belongings within reach  - Initiate and maintain comfort rounds  Outcome: Progressing     Problem: PAIN - ADULT  Goal: Verbalizes/displays adequate comfort level or baseline comfort level  Description: Interventions:  - Encourage patient to monitor pain and request assistance  - Assess pain using appropriate pain scale  - Administer analgesics based on type and severity of pain and evaluate response  - Implement non-pharmacological measures as appropriate and evaluate response  - Consider cultural and social influences on pain and pain management  - Notify physician/advanced practitioner if interventions unsuccessful or patient reports new pain  Outcome: Progressing     Problem: INFECTION - ADULT  Goal: Absence or prevention of progression during hospitalization  Description: INTERVENTIONS:  - Assess and monitor for signs and symptoms of infection  - Monitor lab/diagnostic results  - Monitor all insertion sites, i.e. indwelling lines, tubes, and drains  - Monitor endotracheal if appropriate and nasal secretions for changes in amount and color  - [place] appropriate cooling/warming therapies per order  - Administer medications as ordered  - Instruct and encourage patient and family to use good hand hygiene technique  - Identify and instruct in appropriate isolation precautions for identified infection/condition  Outcome: Progressing     Problem: DISCHARGE PLANNING  Goal: Discharge to home or other facility with appropriate resources  Description: INTERVENTIONS:  - Identify barriers to discharge  w/patient and caregiver  - Arrange for needed discharge resources and transportation as appropriate  - Identify discharge learning needs (meds, wound care, etc.)  - Arrange for interpretive services to assist at discharge as needed  - Refer to Case Management Department for coordinating discharge planning if the patient needs post-hospital services based on physician/advanced practitioner order or complex needs related to functional status, cognitive ability, or social support system  Outcome: Progressing     Problem: Knowledge Deficit  Goal: Patient/family/caregiver demonstrates understanding of disease process, treatment plan, medications, and discharge instructions  Description: Complete learning assessment and assess knowledge base.  Interventions:  - Provide teaching at level of understanding  - Provide teaching via preferred learning methods  Outcome: Progressing     Problem: METABOLIC, FLUID AND ELECTROLYTES - ADULT  Goal: Electrolytes maintained within normal limits  Description: INTERVENTIONS:  - Monitor labs and assess patient for signs and symptoms of electrolyte imbalances  - Administer electrolyte replacement as ordered  - Monitor response to electrolyte replacements, including repeat lab results as appropriate  - Instruct patient on fluid and nutrition as appropriate  Outcome: Progressing  Goal: Fluid balance maintained  Description: INTERVENTIONS:  - Monitor labs   - Monitor I/O and WT  - Instruct patient on fluid and nutrition as appropriate  - Assess for signs & symptoms of volume excess or deficit  Outcome: Progressing

## 2023-12-27 NOTE — DISCHARGE SUMMARY
Critical access hospital  Discharge- Asad Galloway 1960, 63 y.o. male MRN: [id number]  Unit/Bed#: S -01 Encounter: [id number]  Primary Care Provider: Raj Augutse DO   Date and time admitted to hospital: 12/26/2023  2:46 AM    * RSV infection  Assessment & Plan  Presentation: Patient presents with complaints of cough, rhinorrhea, chest pressure and dizziness for the past 3 days. Patient denies fever or chills.  RSV+  Supportive care.  Mucinex BID, Tessalon prn and [place] Nasal spray prn.  Respiratory protocol.  Monitor respiratory status. Currently SpO2 92-99% on room air.    Edema of left lower extremity  Assessment & Plan  Obtain VAS duplex. No evidence of acute or chronic deep vein thrombosis  12/27 improved ; likely was hypervolemia ; on HD ; last received was 12/27     Hypotension  Assessment & Plan  BP 92/46 in ED.   Give dose of Midodrine 12/26   On Midodrine prn as an outpatient.    Ulcer of left heel, limited to breakdown of skin (HCC)  Assessment & Plan  Local wound care.    Elevated troponin  Assessment & Plan  POA troponin of 44 --> 51  History of chronic troponin elevation 2/2 ESRD and CHF    ESRD on dialysis (HCC)  Assessment & Plan  Lab Results   Component Value Date    EGFR 6 12/27/2023    EGFR 5 12/25/2023    EGFR 6 11/19/2023    CREATININE 7.82 (H) 12/27/2023    CREATININE 8.83 (H) 12/25/2023    CREATININE 7.94 (H) 11/19/2023   Patient's wife reports patient had HD on Sunday, 12/24/2023; however, his session was only 1 hour due to HD machine not functioning properly.  HD M/W/F  Nephrology consult. Had HD 12/27     Anemia  Assessment & Plan  POA, hemoglobin 9.2  Baseline hemoglobin 9-10  2/2 ESRD and also with active reinfection ,RSV  Trend CBC.      Medical Problems       Resolved Problems  Date Reviewed: 12/27/2023   None       Discharging Physician / Practitioner: Randolph Walter DO  PCP: Raj Auguste DO  Admission Date:   Admission Orders (From admission, onward)       Ordered     "    12/26/23 0459  Place in Observation  Once                          Discharge Date: 12/27/23    Consultations During Hospital Stay:  Nephrology    Procedures Performed:   HD on 12/27    Significant Findings / Test Results:   RSV +     Incidental Findings:   RSV +      Test Results Pending at Discharge (will require follow up):   None      Outpatient Tests Requested:  None     Complications:  none     Reason for Admission: URI symptoms     Hospital Course:   Asad Galloway is a 63 y.o. male patient who originally presented to the hospital on 12/26/2023 due to URI found to have RSV + , symptomatic management was provided as above , and received HD on 12/27 ; initally requested PT eval on admit, 12/27 pt out of bed with no limitation and reports feeling much better, still on room air, per his request and confirmed with family , PT eval is cancelled, cleared for DC and to follow up with PCP in 1-2 weeks of dc ; denies any symptoms at time of visit today 12/27 around 1 pm , home meds restarted on dc , with no changes.       Please see above list of diagnoses and related plan for additional information.     Condition at Discharge: good    Discharge Day Visit / Exam:   Subjective:  see above , ROS x 12 unremarkable   Vitals: Blood Pressure: 130/69 (12/27/23 1205)  Pulse: 70 (12/27/23 1205)  Temperature: 98.4 °F (36.9 °C) (12/27/23 1205)  Temp Source: Oral (12/27/23 1205)  Respirations: 16 (12/27/23 1205)  Height: 5' 10\" (177.8 cm) (12/26/23 0323)  Weight - Scale: 95.9 kg (211 lb 6.7 oz) (12/25/23 2317)  SpO2: 93 % (12/27/23 0711)    Exam:   - GEN: Appears well, alert and oriented x 3, pleasant and cooperative, in no acute distress  - HEENT: Anicteric, mucous membranes moist, PERRL and EOMI   - NECK: No lymphadenopathy, JVD or carotid bruits   - HEART: RRR, normal S1 and S2, no murmurs, clicks, gallops or rubs   - LUNGS: Clear to auscultation bilaterally; no wheezes, rales, or rhonchi  - ABDOMEN: Normal bowel sounds, " soft, no tenderness, no distention, no organomegaly or masses felt on exam.   - EXTREMITIES: Peripheral pulses normal; no clubbing, cyanosis, or edema  - NEURO: No focal findings, CN II-XII are grossly intact.   - Musculoskeletal: 5/5 strength, normal ROM, no swollen or erythematous joints.   - SKIN: Normal without suspicious lesions on exposed skin      Discussion with Family: Updated  (son) at bedside.    Discharge instructions/Information to patient and family:   See after visit summary for information provided to patient and family.      Provisions for Follow-Up Care:  See after visit summary for information related to follow-up care and any pertinent home health orders.      Mobility at time of Discharge:   Basic Mobility Inpatient Raw Score: 20  JH-HLM Goal: 6: Walk 10 steps or more  JH-HLM Achieved: 6: Walk 10 steps or more  HLM Goal achieved. Continue to encourage appropriate mobility.     Disposition:   Home    Planned Readmission: no     Discharge Statement:  I spent 45 minutes discharging the patient. This time was spent on the day of discharge. I had direct contact with the patient on the day of discharge. Greater than 50% of the total time was spent examining patient, answering all patient questions, arranging and discussing plan of care with patient as well as directly providing post-discharge instructions.  Additional time then spent on discharge activities.    Discharge Medications:  See after visit summary for reconciled discharge medications provided to patient and/or family.      **Please Note: This note may have been constructed using a voice recognition system**

## 2023-12-27 NOTE — ASSESSMENT & PLAN NOTE
POA, hemoglobin 9.2  Baseline hemoglobin 9-10  2/2 ESRD and also with active reinfection ,RSV  Trend CBC.

## 2023-12-27 NOTE — ASSESSMENT & PLAN NOTE
Presentation: Patient presents with complaints of cough, rhinorrhea, chest pressure and dizziness for the past 3 days. Patient denies fever or chills.  RSV+  Supportive care.  Mucinex BID, Tessalon prn and [place] Nasal spray prn.  Respiratory protocol.  Monitor respiratory status. Currently SpO2 92-99% on room air.

## 2023-12-27 NOTE — ASSESSMENT & PLAN NOTE
Obtain VAS duplex. No evidence of acute or chronic deep vein thrombosis  12/27 improved ; likely was hypervolemia ; on HD ; last received was 12/27

## 2023-12-27 NOTE — PLAN OF CARE
Pt. On HD treatment for 3.5 hrs with a UF goal of 1-2 L as tolerated. Pt on a 2 k 2.5 rakan bath for a potassium of 4.8 on 12/27/23.  Problem: METABOLIC, FLUID AND ELECTROLYTES - ADULT  Goal: Electrolytes maintained within normal limits  Description: INTERVENTIONS:  - Monitor labs and assess patient for signs and symptoms of electrolyte imbalances  - Administer electrolyte replacement as ordered  - Monitor response to electrolyte replacements, including repeat lab results as appropriate  - Instruct patient on fluid and nutrition as appropriate  Outcome: Progressing  Goal: Fluid balance maintained  Description: INTERVENTIONS:  - Monitor labs   - Monitor I/O and WT  - Instruct patient on fluid and nutrition as appropriate  - Assess for signs & symptoms of volume excess or deficit  Outcome: Progressing

## 2023-12-27 NOTE — PROGRESS NOTES
NEPHROLOGY PROGRESS NOTE   Asad Galloway 63 y.o. male MRN: [id number]  Unit/Bed#: S -01 Encounter: [id number]  Reason for Consult: Management of ESRD    ASSESSMENT AND PLAN:   63 y.o man with PMH ESRD on HD MWF at [facility], shortness of breath, obesity, DM, CAD, CVA, GERD, previous admission with AMS p/w cough, rhinorrhea, chest pressure.  Nephrology consulted for management of ESRD     PLAN     #ESRD on HD MWF:  Dialysis unit/days: [facility]  Access: AV fistula  Had dialysis yesterday, well-tolerated  Currently on dialysis this morning with hypotension  Plan to continue HD as per schedule, next dialysis Friday if remains inpatient  Renal Diet  Fluid restriction 1-1.5L/d  Adjust medications to GFR<10  Stable for discharge from our end     #Volume status/hypertension:  Volume: Euvolemic  Blood pressure: Normotensive, /70, became hypotensive during dialysis  Midodrine on dialysis as needed  On metoprolol   Entresto  UF on  mL today due to intradialytic hypotension        #Secondary Hyperparasitoidism   Low phosphorus diet     # Anemia of Kidney Disease  Current hemoglobin: 9.3mg/dL  Treatment:  No EPO due to history of CVA  Transfuse for hemoglobin less than 7.0 per primary service       # RSV  On room air  Complains of cough   Stable       HEMODIALYSIS PROCEDURE NOTE  The patient was seen and examined on hemodialysis. The patient is tolerating the procedure well.  Time: 3.5 hours  Sodium: 135 Blood flow: 400   Dialyzer: F160 Potassium: As per protocol Dialysate flow: 600   Access: AV fistula Bicarbonate: 35 Ultrafiltration goal: 500 mL   Medications on HD: None       SUBJECTIVE:  Patient seen and examined at bedside during dialysis.  Intradialytic hypotension.  Complains of cough with no shortness of breath    OBJECTIVE:  Current Weight: Weight - Scale: 95.9 kg (211 lb 6.7 oz)  Vitals:    12/27/23 1130   BP: 130/70   Pulse: 70   Resp: 18   Temp:    SpO2:        Intake/Output Summary (Last 24 hours) at  12/27/2023 1139  Last data filed at 12/27/2023 0930  Gross per 24 hour   Intake 800 ml   Output 2500 ml   Net -1700 ml     Wt Readings from Last 3 Encounters:   12/25/23 95.9 kg (211 lb 6.7 oz)   12/21/23 95.7 kg (210 lb 15.7 oz)   12/21/23 95.7 kg (211 lb)     Temp Readings from Last 3 Encounters:   12/27/23 98 °F (36.7 °C)   12/21/23 98 °F (36.7 °C)   12/21/23 (!) 97 °F (36.1 °C)     BP Readings from Last 3 Encounters:   12/27/23 130/70   12/21/23 122/82   12/21/23 122/82     Pulse Readings from Last 3 Encounters:   12/27/23 70   12/21/23 62   12/21/23 84        General:  no acute distress at this time  Skin:  No acute rash  Eyes:  No scleral icterus and noninjected  ENT:  mucous membranes moist  Neck:  no carotid bruits  Chest:  Clear to auscultation percussion, good respiratory effort, no use of accessory respiratory muscles  CVS:  Regular rate and rhythm without rub   Abdomen:  soft and nontender   Extremities: no significant lower extremity edema  Neuro:  No gross focality  Psych:  Alert , cooperative  Dialysis access: Left AV fistula      Medications:    Current Facility-Administered Medications:     aspirin (ECOTRIN LOW STRENGTH) EC tablet 81 mg, 81 mg, Oral, Daily, Aggie McGeehan, CRNP, 81 mg at 12/26/23 0749    atorvastatin (LIPITOR) tablet 40 mg, 40 mg, Oral, Daily With Dinner, Aggie McGeehan, CRNP, 40 mg at 12/26/23 1804    benzonatate (TESSALON PERLES) capsule 100 mg, 100 mg, Oral, TID PRN, Aggie McGeehan, CRNP, 100 mg at 12/27/23 0917    divalproex sodium (DEPAKOTE SPRINKLE) capsule 250 mg, 250 mg, Oral, Q12H MICH, Aggie McGeehan, CRNP, 250 mg at 12/26/23 2134    fluticasone (FLONASE) 50 mcg/act nasal spray 1 spray, 1 spray, Nasal, Daily, Aggie McGeehan, CRNP, 1 spray at 12/26/23 0748    gabapentin (NEURONTIN) capsule 100 mg, 100 mg, Oral, BID, BRITTANY Valderrama, 100 mg at 12/26/23 0750    guaiFENesin (MUCINEX) 12 hr tablet 600 mg, 600 mg, Oral, Q12H MICH, BRITTANY Valderrama, 600 mg at  "12/26/23 2133    heparin (porcine) subcutaneous injection 5,000 Units, 5,000 Units, Subcutaneous, Q8H MICH, AggieLESLI ReneeNP, 5,000 Units at 12/27/23 0514    metoprolol succinate (TOPROL-XL) 24 hr tablet 50 mg, 50 mg, Oral, BID, Aggie McGasiahan, CRNP, 50 mg at 12/26/23 2138    midodrine (PROAMATINE) tablet 10 mg, 10 mg, Oral, Before Dialysis, Joselyn Reyes Bahamonde, MD, 10 mg at 12/27/23 0917    prasugrel (EFFIENT) tablet 5 mg, 5 mg, Oral, Daily, Aggie McGjurgen, CRNP, 5 mg at 12/26/23 0929    sacubitril-valsartan (ENTRESTO) 24-26 MG per tablet 1 tablet, 1 tablet, Oral, Daily, Aggie McGeehan, CRNP, 1 tablet at 12/26/23 0751    sodium chloride (OCEAN) 0.65 % nasal spray 2 spray, 2 spray, Each Nare, Q1H PRN, Aggie Too, CRNP    Laboratory Results:  Results from last 7 days   Lab Units 12/27/23  0513 12/25/23  2325   WBC Thousand/uL 4.58 3.93*   HEMOGLOBIN g/dL 9.3* 9.2*   HEMATOCRIT % 30.3* 29.9*   PLATELETS Thousands/uL 92* 99*   SODIUM mmol/L 136 140   POTASSIUM mmol/L 4.8 4.6   CHLORIDE mmol/L 97 97   CO2 mmol/L 26 29   BUN mg/dL 51* 65*   CREATININE mg/dL 7.82* 8.83*   CALCIUM mg/dL 7.9* 7.9*       VAS lower limb venous duplex study, unilateral/limited   Final Result by Michelle Galvan MD (12/26 1311)      XR chest 1 view portable   ED Interpretation by Tatyana Rene MD (12/26 0617)   Cardiomegaly, pulmonary vascular congestion      Final Result by Rayo Rush MD (12/26 1113)      Probable mild pulmonary edema versus less likely pneumonia. Correlate with clinical scenario                  Workstation performed: [id number]             Portions of the record may have been created with voice recognition software. Occasional wrong word or \"sound a like\" substitutions may have occurred due to the inherent limitations of voice recognition software. Read the chart carefully and recognize, using context, where substitutions have occurred.    "

## 2023-12-28 ENCOUNTER — TRANSITIONAL CARE MANAGEMENT (OUTPATIENT)
Dept: INTERNAL MEDICINE CLINIC | Facility: CLINIC | Age: 63
End: 2023-12-28

## 2024-01-04 ENCOUNTER — TELEPHONE (OUTPATIENT)
Dept: CARDIOLOGY CLINIC | Facility: CLINIC | Age: 64
End: 2024-01-04

## 2024-01-04 ENCOUNTER — OFFICE VISIT (OUTPATIENT)
Dept: WOUND CARE | Facility: HOSPITAL | Age: 64
End: 2024-01-04
Payer: MEDICARE

## 2024-01-04 ENCOUNTER — OFFICE VISIT (OUTPATIENT)
Dept: INTERNAL MEDICINE CLINIC | Facility: CLINIC | Age: 64
End: 2024-01-04
Payer: MEDICARE

## 2024-01-04 VITALS
DIASTOLIC BLOOD PRESSURE: 54 MMHG | TEMPERATURE: 97 F | SYSTOLIC BLOOD PRESSURE: 110 MMHG | HEART RATE: 68 BPM | RESPIRATION RATE: 18 BRPM

## 2024-01-04 VITALS
HEIGHT: 70 IN | DIASTOLIC BLOOD PRESSURE: 45 MMHG | HEART RATE: 65 BPM | BODY MASS INDEX: 29.78 KG/M2 | OXYGEN SATURATION: 98 % | SYSTOLIC BLOOD PRESSURE: 97 MMHG | WEIGHT: 208 LBS | TEMPERATURE: 97 F

## 2024-01-04 DIAGNOSIS — B35.1 ONYCHOMYCOSIS: ICD-10-CM

## 2024-01-04 DIAGNOSIS — N18.6 TYPE 2 DIABETES MELLITUS WITH CHRONIC KIDNEY DISEASE ON CHRONIC DIALYSIS, WITHOUT LONG-TERM CURRENT USE OF INSULIN (HCC): ICD-10-CM

## 2024-01-04 DIAGNOSIS — Z86.19 HISTORY OF RSV INFECTION: ICD-10-CM

## 2024-01-04 DIAGNOSIS — J01.90 ACUTE NON-RECURRENT SINUSITIS, UNSPECIFIED LOCATION: ICD-10-CM

## 2024-01-04 DIAGNOSIS — Z89.421 ABSENCE OF TOE OF RIGHT FOOT (HCC): ICD-10-CM

## 2024-01-04 DIAGNOSIS — L97.423 ULCER OF HEEL, LEFT, WITH NECROSIS OF MUSCLE (HCC): Primary | ICD-10-CM

## 2024-01-04 DIAGNOSIS — F39 MOOD DISORDER (HCC): ICD-10-CM

## 2024-01-04 DIAGNOSIS — Z09 HOSPITAL DISCHARGE FOLLOW-UP: Primary | ICD-10-CM

## 2024-01-04 DIAGNOSIS — N18.6 ESRD ON DIALYSIS (HCC): ICD-10-CM

## 2024-01-04 DIAGNOSIS — E11.22 TYPE 2 DIABETES MELLITUS WITH CHRONIC KIDNEY DISEASE ON CHRONIC DIALYSIS, WITHOUT LONG-TERM CURRENT USE OF INSULIN (HCC): ICD-10-CM

## 2024-01-04 DIAGNOSIS — Z99.2 TYPE 2 DIABETES MELLITUS WITH CHRONIC KIDNEY DISEASE ON CHRONIC DIALYSIS, WITHOUT LONG-TERM CURRENT USE OF INSULIN (HCC): ICD-10-CM

## 2024-01-04 DIAGNOSIS — Z99.2 ESRD ON DIALYSIS (HCC): ICD-10-CM

## 2024-01-04 PROBLEM — B33.8 RSV INFECTION: Status: RESOLVED | Noted: 2023-12-26 | Resolved: 2024-01-04

## 2024-01-04 PROCEDURE — 11043 DBRDMT MUSC&/FSCA 1ST 20/<: CPT | Performed by: PODIATRIST

## 2024-01-04 PROCEDURE — 11720 DEBRIDE NAIL 1-5: CPT | Performed by: PODIATRIST

## 2024-01-04 PROCEDURE — 11721 DEBRIDE NAIL 6 OR MORE: CPT | Performed by: PODIATRIST

## 2024-01-04 PROCEDURE — 99495 TRANSJ CARE MGMT MOD F2F 14D: CPT | Performed by: INTERNAL MEDICINE

## 2024-01-04 RX ORDER — LIDOCAINE 40 MG/G
CREAM TOPICAL ONCE
Status: COMPLETED | OUTPATIENT
Start: 2024-01-04 | End: 2024-01-04

## 2024-01-04 RX ORDER — AMOXICILLIN 400 MG/5ML
500 POWDER, FOR SUSPENSION ORAL DAILY
Qty: 44.1 ML | Refills: 0 | Status: SHIPPED | OUTPATIENT
Start: 2024-01-04 | End: 2024-01-11

## 2024-01-04 RX ADMIN — LIDOCAINE: 40 CREAM TOPICAL at 11:48

## 2024-01-04 NOTE — PATIENT INSTRUCTIONS
Orders Placed This Encounter   Procedures    Debridement Diabetic Ulcer Left Heel     This order was created via procedure documentation    Wound cleansing and dressings Diabetic Ulcer Left Heel     Wound location left heel  Change dressing daily until NPWT ([facility] VAC) is received   The dressing must stay dry and intact. You may shower with dressing protected by cast cover or bag. Or you may sponge bathe.   Moisten gauze with 1/4 strength dakins solution, apply to wound   Cover with gauze/ABD  Secure with rolled gauze and tape.     Supplies were not shipped at last request because it was less than 30 days therefore insurance will not approve. Pt to purchase just enough supplies from local pharmacy to get through until next week.     Order for [employer] NPWT will be faxed once DPM note completed.     This was applied today at the [facility].     Standing Status:   Future     Standing Expiration Date:   1/4/2025    Wound off loading Diabetic Ulcer Left Heel     Continue use of heel relief shoe. Ensure no pressure on wound, offload with heel medic/prevalon or pillows to float wound off surfaces.     Standing Status:   Future     Standing Expiration Date:   1/4/2025    Wound home care Diabetic Ulcer Left Heel     A referral will be sent for [facility] to begin wound care once NPWT is received. Phone: [phone].     Standing Status:   Future     Standing Expiration Date:   1/4/2025    Wound negative pressure wound therapy Diabetic Ulcer Left Heel     Left heel NPWT (initiate once received)   Wound care 3x/wk and prn leakage  Cleanse the wound with nss or wound cleanser, rinse and pat dry  Apply skin prep and VAC film to periwound and area for bridge  Apply black foam to wound  Bridge off and seal at 125mmHg continuous suction.   May use kerlix to help protect dressing from dislodgement but ensure tubing is not compressed against skin on foot or leg.    PATIENT AND SON WERE MADE AWARE THAT THE NPWT MAY  NOT BE REC'D UNTIL THE DAY PRIOR TO DR DIONI ISBELL NEXT WEEK, IN THAT CASE THEY ARE TO BRING THE CHARGED UNIT WITH SUPPLIES AND IT WILL BE APPLIED AT THE WOUND CENTER.   Always bring one sponge packet and one canister to wound care appts as those supplies are not stocked in the wound center.     If an issue arises with the NPWT, read the errors on the screen and follow directions. There is also a number to a [employer] clinician on the machine that you may call for troubleshooting during off work hours. If during working hours and unable to clear an error call [facility]. If the NPWT becomes completely inoperable, gently remove the dressing and apply either NSS moistened gauze or 1/4 strength dakins moistened gauze to wound, cover with dry gauze. Call   [facility].     Standing Status:   Future     Standing Expiration Date:   1/4/2025

## 2024-01-04 NOTE — TELEPHONE ENCOUNTER
Spoke to pt's son and went over information from Dr Tan and advised, pt son wrote information down. Pt's son verbally understood

## 2024-01-04 NOTE — PROGRESS NOTES
Assessment & Plan     1. Hospital discharge follow-up    2. History of RSV infection  Comments:  mostly resolved, however suspect Asad has a sinus infection(see below)    3. Acute non-recurrent sinusitis, unspecified location  Comments:  amoxicillin order printed.  wife will take to d/w his nephrologist at dialysis tomorrow to see how he should take abx given ESRD  Orders:  -     amoxicillin (AMOXIL) 400 MG/5ML suspension; Take 6.3 mL (500 mg total) by mouth daily for 7 days As directed by your nephrologist    4. Absence of toe of right foot ([facility])  Comments:  due to cellulitis of toe several yrs back.  c/w podiatric care  Orders:  -     Ambulatory Referral to Complex Care Management Program; Future    5. Mood disorder ([facility])  Comments:  taking depakote and stable at this time.  he is anxiously waiting for a kidney transplant.    counseling provided  Orders:  -     Ambulatory Referral to Complex Care Management Program; Future    6. ESRD on dialysis ([facility])  Comments:  goes M/W/F.  wife will ask if Asad is permitted more fluids to consume temporarily given his sinus infection  Orders:  -     Ambulatory Referral to Complex Care Management Program; Future         Subjective     Transitional Care Management Review:   Asad Galloway is a 63 y.o. male here for TCM follow up.     During the TCM phone call patient stated:  TCM Call       Date and time call was made  12/28/2023  9:38 AM    Hospital care reviewed  Records reviewed    Patient was hospitialized at  [facility]    Date of Admission  12/26/23    Date of discharge  12/27/23    Diagnosis  rsv    Disposition  Home    Were the patients medications reviewed and updated  No    Current Symptoms  None          TCM Call       Post hospital issues  None    Should patient be enrolled in anticoag monitoring?  No    Scheduled for follow up?  Yes    Patient refusal reason  DID NOT RETURN ANY OF MY PHONE CALLS TO SCHEDULE HIS APPT    Did you obtain your prescribed  medications  Yes    Do you need help managing your prescriptions or medications  No    Is transportation to your appointment needed  No    I have advised the patient to call PCP with any new or worsening symptoms  Siena Howard MA    Living Arrangements  Family members    Support System  None    The type of support provided  Emotional; Financial; Physical    Are you recieving any outpatient services  No    Are you recieving home care services  No    Are you using any community resources  No    Current waiver services  No    Have you fallen in the last 12 months  No    Interperter language line needed  No    Counseling  Family    Counseling topics  Importance of RX compliance    Comments  Left message for call back          HPI    Here for hospital discharge follow up, Asad is here with his wife during today's appt.  He was admitted to [facility] for RSV infection and discharged to home the following day.  He is feeling better but still coughing and having sinus congestion and pressure.  He does not have nosebleeding since he is using humidifer in the home.  He was to HD yesterday and they took 3L off.  His BP is on lower end today and he feels tired, fatigued.  He is taking toprol and entresto per cardiology.  Wife has tried to find help/caregiver to help Asad with his ADLs and IADLs, however unsuccessfully.  ROS otherwise negative, no other complaints.    Review of Systems   Constitutional:  Positive for fatigue. Negative for chills and fever.   HENT:  Positive for congestion, postnasal drip, rhinorrhea and sinus pressure.    Eyes:  Negative for visual disturbance.   Respiratory:  Positive for cough. Negative for shortness of breath.    Cardiovascular:  Negative for chest pain.   Gastrointestinal:  Negative for abdominal pain.   Genitourinary:  Difficulty urinating: is on dialysis.   Musculoskeletal:  Negative for joint swelling.   Skin:  Negative for rash.   Neurological:  Negative for speech difficulty.  "  Psychiatric/Behavioral:  Negative for dysphoric mood.        Objective     BP (!) 97/45   Pulse 65   Temp (!) 97 °F (36.1 °C) (Tympanic)   Ht 5' 10\" (1.778 m)   Wt 94.3 kg (208 lb)   SpO2 98%   BMI 29.84 kg/m²      Physical Exam  Vitals reviewed.   Constitutional:       General: He is not in acute distress.     Appearance: Normal appearance. He is ill-appearing.      Comments: Coughing during the appointment   HENT:      Head: Normocephalic and atraumatic.      Right Ear: Tympanic membrane normal.      Left Ear: Tympanic membrane normal.      Nose: Congestion and rhinorrhea present.      Right Sinus: Maxillary sinus tenderness and frontal sinus tenderness present.      Left Sinus: Maxillary sinus tenderness and frontal sinus tenderness present.   Eyes:      Conjunctiva/sclera: Conjunctivae normal.   Cardiovascular:      Rate and Rhythm: Normal rate and regular rhythm.      Heart sounds:      No gallop.   Pulmonary:      Effort: Pulmonary effort is normal.      Breath sounds: No wheezing or rales.   Abdominal:      General: Abdomen is flat. Bowel sounds are normal.      Palpations: Abdomen is soft.      Tenderness: There is no abdominal tenderness.   Musculoskeletal:      Right lower leg: No edema.      Left lower leg: No edema.   Lymphadenopathy:      Cervical: No cervical adenopathy.   Neurological:      Mental Status: He is alert. Mental status is at baseline.   Psychiatric:         Mood and Affect: Mood normal.         Behavior: Behavior normal.       Medications have been reviewed by provider in current encounter    Raj Auguste DO         "

## 2024-01-04 NOTE — Clinical Note
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  He attends HD 3 days of the week  He is taking toprol and entresto for his heart disease  Given his low BP, could either of these med doses be lowered?  If yes, can your office reach out to the patient's wife?  She asked me to message you  thanks

## 2024-01-04 NOTE — PATIENT INSTRUCTIONS
Call your cardiologist if you need to continue entrestro and metoprolol because you have low blood pressure

## 2024-01-04 NOTE — PROGRESS NOTES
Patient ID: Asad Galloway is a 63 y.o. male Date of Birth 1960     Chief Complaint  Chief Complaint   Patient presents with    Follow Up Wound Care Visit     Left heel wound       Allergies  Patient has no known allergies.    Assessment:    No problem-specific Assessment & Plan notes found for this encounter.       Diagnoses and all orders for this visit:    Ulcer of heel, left, with necrosis of muscle ([facility])  -     lidocaine (LMX) 4 % cream  -     Debridement Diabetic Ulcer Left Heel  -     Wound cleansing and dressings Diabetic Ulcer Left Heel; Future  -     Wound off loading Diabetic Ulcer Left Heel; Future  -     Wound home care Diabetic Ulcer Left Heel; Future  -     Cancel: Wound negative pressure wound therapy Diabetic Ulcer Left Heel; Future  -     Wound negative pressure wound therapy Diabetic Ulcer Left Heel; Future  -     Referral to [facility]; Future    Type 2 diabetes mellitus with chronic kidney disease on chronic dialysis, without long-term current use of insulin ([facility])  -     lidocaine (LMX) 4 % cream  -     Debridement Diabetic Ulcer Left Heel  -     Wound cleansing and dressings Diabetic Ulcer Left Heel; Future  -     Wound off loading Diabetic Ulcer Left Heel; Future  -     Wound home care Diabetic Ulcer Left Heel; Future  -     Cancel: Wound negative pressure wound therapy Diabetic Ulcer Left Heel; Future  -     Wound negative pressure wound therapy Diabetic Ulcer Left Heel; Future  -     Referral to [facility]; Future    Onychomycosis  -     Wound cleansing and dressings Diabetic Ulcer Left Heel; Future  -     Wound off loading Diabetic Ulcer Left Heel; Future  -     Wound home care Diabetic Ulcer Left Heel; Future  -     Cancel: Wound negative pressure wound therapy Diabetic Ulcer Left Heel; Future  -     Wound negative pressure wound therapy Diabetic Ulcer Left Heel; Future  -     Referral to [facility]; Future              Debridement  "  Wound 06/02/23 Diabetic Ulcer Heel Left    Universal Protocol:  Consent: Verbal consent obtained.  Risks and benefits: risks, benefits and alternatives were discussed  Consent given by: patient  Time out: Immediately prior to procedure a \"time out\" was called to verify the correct patient, procedure, equipment, support staff and site/side marked as required.  Timeout called at: 1/4/2024 11:49 PM.  Patient understanding: patient states understanding of the procedure being performed  Patient identity confirmed: verbally with patient    Debridement Details  Performed by: physician  Debridement type: surgical  Level of debridement: muscle  Pain control: lidocaine 4%      Post-debridement measurements  Length (cm): 1.2  Width (cm): 2.5  Depth (cm): 0.8  Percent debrided: 100%  Surface Area (cm^2): 3  Area Debrided (cm^2): 3  Volume (cm^3): 2.4    Tissue and other material debrided: dermis, epidermis, fascia, muscle and subcutaneous tissue  Devitalized tissue debrided: fibrin and slough  Instrument(s) utilized: blade  Bleeding: small  Hemostasis obtained with: pressure  Procedural pain (0-10): 0  Post-procedural pain: 0   Response to treatment: procedure was tolerated well      Procedure: All mycotic toenails were reduced and debrided in length, width, and girth using a nail nipper.  Patient tolerated procedure(s) well without complications.      Plan:  1. Reviewed medical records.  Patient was counseled on the condition and diagnosis.    2. No significant improvement since the last week.  Wound debrided.  Will try wound VAC.   Consider MRI depending on the progress.    3. Continue Globopedi shoe.   Stressed on patient compliance about proper off-loading in bed with Prevalon boot.  His ulcer would not heal without proper off-loading and patient compliance.    4.  RA in 1 week.         Wound 06/02/23 Diabetic Ulcer Heel Left (Active)   Enter Oliveros score: Oliveros Grade 1: Partial or full-thickness ulcer (superficial) " 01/04/24 1146   Wound Image Images linked 01/04/24 1157   Wound Description Necrotic;Slough;Yellow;Gray 01/04/24 1146   Lani-wound Assessment Intact 01/04/24 1146   Wound Length (cm) 1.1 cm 01/04/24 1146   Wound Width (cm) 2.5 cm 01/04/24 1146   Wound Depth (cm) 0.8 cm 01/04/24 1146   Wound Surface Area (cm^2) 2.75 cm^2 01/04/24 1146   Wound Volume (cm^3) 2.2 cm^3 01/04/24 1146   Calculated Wound Volume (cm^3) 2.2 cm^3 01/04/24 1146   Undermining 0.4 01/04/24 1146   Undermining is depth extending from 1-5 o'clock 01/04/24 1146   Drainage Amount Moderate 01/04/24 1146   Drainage Description Tan;Foul smelling;Yellow 01/04/24 1146   Non-staged Wound Description Full thickness 01/04/24 1146   Dressing Status Intact 01/04/24 1146       Wound 06/02/23 Diabetic Ulcer Heel Left (Active)   Date First Assessed/Time First Assessed: 06/02/23 1908   Primary Wound Type: Diabetic Ulcer  Location: Heel  Wound Location Orientation: Left  Dressing Status: Clean;Dry;Intact       [REMOVED] Wound 01/29/23 Abrasion(s) Pretibial Right (Removed)   Resolved Date/Resolved Time: 12/21/23 0834  Date First Assessed/Time First Assessed: 01/29/23 1218   Traumatic Wound Type: Abrasion(s)  Location: Pretibial  Wound Location Orientation: Right  Wound Description (Comments): Scabbed  Wound Outcome: Other...       [REMOVED] Wound 02/01/23 Wrist Anterior;Right (Removed)   Resolved Date: 12/26/23  Date First Assessed/Time First Assessed: 02/01/23 1806   Location: Wrist  Wound Location Orientation: Anterior;Right       [REMOVED] Wound 02/01/23 Skin Tear Abrasion(s) Arm Left;Posterior;Proximal (Removed)   Resolved Date: 12/26/23  Date First Assessed/Time First Assessed: 02/01/23 1930   Primary Wound Type: Skin Tear  Traumatic Wound Type: Abrasion(s)  Location: Arm  Wound Location Orientation: Left;Posterior;Proximal       [REMOVED] Wound 09/29/23 Groin Right (Removed)   Resolved Date: 12/26/23  Date First Assessed/Time First Assessed: 09/29/23 0992    Location: Groin  Wound Location Orientation: Right  Wound Description (Comments): New Puncture site noted   Incision's 1st Dressing: ADHESIVE SKIN HIGH VISCOSITY EXOFIN ...       Subjective:          HPI  The patients presents for evaluation of left heel ulcer.  BS under control.  He does not wear Prevalon boot every night.   He complained of thick, elongated toenails.      The following portions of the patient's history were reviewed and updated as appropriate: allergies, current medications, past family history, past medical history, past social history, past surgical history, and problem list.      PAST MEDICAL HISTORY:  Past Medical History:   Diagnosis Date    Cerebrovascular accident (CVA) due to thrombosis of left middle cerebral artery (HCC) 07/29/2018    Chronic kidney disease     Coronary artery disease     Diabetes mellitus (HCC)     not on meds    Dialysis patient ([facility])     M-W-F    Fistula     left upper arm for hemodialyis    GERD (gastroesophageal reflux disease)     History of coronary artery stent placement     x3    Hypercholesteremia     Hyperlipidemia     Hypertension     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    Limb alert care status     no BP/IV left arm    Neuropathy     Obesity     Osteomyelitis (HCC)     last assessed 11/4/16-per son not currently    PVC's (premature ventricular contractions)     sees  Cardio    Stroke ([facility])     last weeof July 2018 [facility]    TIA (transient ischemic attack) 10/28/2018    Wears dentures     Wears glasses        PAST SURGICAL HISTORY:  Past Surgical History:   Procedure Laterality Date    ABDOMINAL SURGERY      CARDIAC CATHETERIZATION N/A 05/02/2022    Procedure: Cardiac Coronary Angiogram;  Surgeon: Sam Davis MD;  [facility];  Service: Cardiology    CARDIAC CATHETERIZATION N/A 05/02/2022    Procedure: Cardiac pci;  Surgeon: Sam Davis MD;  [facility];  Service: Cardiology    CARDIAC  "CATHETERIZATION  02/01/2023    Procedure: Cardiac catheterization;  Surgeon: Sam Davis MD;  [facility]: [facility];  Service: Cardiology    CARDIAC CATHETERIZATION N/A 02/01/2023    Procedure: Cardiac pci;  Surgeon: Sam Davis MD;  [facility]: [facility];  Service: Cardiology    CARDIAC CATHETERIZATION N/A 02/01/2023    Procedure: Cardiac Coronary Angiogram;  Surgeon: Sam Davis MD;  [facility]: [facility];  Service: Cardiology    CARDIAC CATHETERIZATION N/A 02/01/2023    Procedure: Cardiac other-IVUS;  Surgeon: Sam Davis MD;  [facility]: [facility];  Service: Cardiology    CHOLECYSTECTOMY      Percutaneous    COLONOSCOPY      CYSTOSCOPY      IR LOWER EXTREMITY ANGIOGRAM  9/29/2023    OTHER SURGICAL HISTORY      \"stimulator to control bowel movements\"    RI ESOPHAGOGASTRODUODENOSCOPY TRANSORAL DIAGNOSTIC N/A 09/27/2016    Procedure: ESOPHAGOGASTRODUODENOSCOPY (EGD);  Surgeon: Adele Rowe MD;  Location: [facility] GI LAB;  Service: Gastroenterology    RI LAPAROSCOPY SURG CHOLECYSTECTOMY N/A 02/29/2016    Procedure: LAPAROSCOPIC CHOLECYSTECTOMY ;  Surgeon: Cliff Roman DO;  Location: [facility] Main OR;  Service: General    RI SLCTV CATHJ 3RD+ ORD SLCTV ABDL PEL/LXTR BRNCH Left 9/29/2023    Procedure: Left leg angiogram with intervention;  Surgeon: Michelle Galvan MD;  Location: [facility];  Service: Vascular    ROTATOR CUFF REPAIR Right     SPINAL CORD STIMULATOR IMPLANT      \"Medtronic interstim model # 3058- in lower back to control bowel movements-currently turned off-battery is dead\"    TOE AMPUTATION Right 10/28/2016    Procedure: 3RD TOE AMPUTATION ;  Surgeon: Anjel Salas DPM;  Location: [facility];  Service:         ALLERGIES:  Patient has no known allergies.    MEDICATIONS:  Current Outpatient Medications   Medication Sig Dispense Refill    amoxicillin (AMOXIL) 400 MG/5ML suspension Take 6.3 mL (500 mg total) by mouth daily for 7 days As directed by your nephrologist " 44.1 mL 0    aspirin (ECOTRIN LOW STRENGTH) 81 mg EC tablet Take 81 mg by mouth daily Resume on 8/14        atorvastatin (LIPITOR) 40 mg tablet Take 1 tablet (40 mg total) by mouth daily with dinner 90 tablet 1    Blood Glucose Monitoring Suppl (True Metrix Meter) w/Device KIT Use to test blood sugars 3 times daily 1 kit 0    Blood Pressure Monitoring (BLOOD PRESSURE CUFF) MISC Use to check blood pressure before taking blood pressure medication and 1 hour after and follow instructions provided in discharge instructions based on the readings. 1 each 0    Cholecalciferol (Vitamin D3) 1.25 MG (46961 UT) CAPS TAKE 1 CAPSULE BY MOUTH ONE TIME PER WEEK 12 capsule 3    divalproex sodium (DEPAKOTE SPRINKLE) 125 MG capsule TAKE 2 CAPSULES (250 MG TOTAL) BY MOUTH EVERY 12 (TWELVE) HOURS 360 capsule 1    fluticasone (FLONASE) 50 mcg/act nasal spray 1 spray into each nostril daily 9.9 mL 0    metoprolol succinate (TOPROL-XL) 50 mg 24 hr tablet Take 1 tablet (50 mg total) by mouth 2 (two) times a day 180 tablet 3    midodrine (PROAMATINE) 2.5 mg tablet Take 1 tablet (2.5 mg total) by mouth as needed (for SBP<90 on dialysis days) 30 tablet 0    ONETOUCH DELICA LANCETS 33G MISC by Does not apply route 3 (three) times a day 270 each 1    prasugrel (EFFIENT) tablet Take 1 tablet (5 mg total) by mouth daily (Patient taking differently: Take 5 mg by mouth daily Takes with pudding) 90 tablet 3    sacubitril-valsartan (Entresto) 24-26 MG TABS Take 1 tablet by mouth in the morning Bedtime 90 tablet 3    Sevelamer Carbonate 2.4 g PACK VIGOROUSLY MIX CONTENTS OF 1 PACKET IN WATER (IT WILL NOT DISSOLVE) AND DRINK 3 TIMES DAILY WITH MEALS      sodium hypochlorite (DAKIN'S HALF-STRENGTH) external solution Apply 1 Application topically every other day At each dressing change 473 mL 0    True Metrix Blood Glucose Test test strip USE 1 EACH 3 TIMES A DAY AS DIRECTED 300 strip 1     No current facility-administered medications for this visit.        SOCIAL HISTORY:  Social History     Socioeconomic History    Marital status: /Civil Union     Spouse name: None    Number of children: None    Years of education: None    Highest education level: Not asked   Occupational History    Occupation: disabled   Tobacco Use    Smoking status: Never    Smokeless tobacco: Never   Vaping Use    Vaping status: Never Used   Substance and Sexual Activity    Alcohol use: Never    Drug use: No    Sexual activity: None     Comment: defer   Other Topics Concern    None   Social History Narrative    Daily caffeine consumption 2-3 servings a day     Social Determinants of Health     Financial Resource Strain: Patient Declined (7/13/2023)    Overall Financial Resource Strain (CARDIA)     Difficulty of Paying Living Expenses: Patient declined   Food Insecurity: No Food Insecurity (2/1/2023)    Hunger Vital Sign     Worried About Running Out of Food in the Last Year: Never true     Ran Out of Food in the Last Year: Never true   Transportation Needs: No Transportation Needs (7/13/2023)    PRAPARE - Transportation     Lack of Transportation (Medical): No     Lack of Transportation (Non-Medical): No   Physical Activity: Not on file   Stress: No Stress Concern Present (1/18/2019)    [employer] [place] [employer] - Occupational Stress Questionnaire     Feeling of Stress : Only a little   Social Connections: Unknown (1/18/2019)    Social Connection and Isolation Panel [NHANES]     Frequency of Communication with Friends and Family: Not on file     Frequency of Social Gatherings with Friends and Family: Not on file     Attends Orthodoxy Services: Not on file     Active Member of Clubs or Organizations: Not on file     Attends Club or Organization Meetings: Not on file     Marital Status:    Intimate Partner Violence: Not At Risk (1/18/2019)    Humiliation, Afraid, Rape, and Kick questionnaire     Fear of Current or Ex-Partner: No     Emotionally Abused: No      Physically Abused: No     Sexually Abused: No   Housing Stability: Low Risk  (2/1/2023)    Housing Stability Vital Sign     Unable to Pay for Housing in the Last Year: No     Number of Places Lived in the Last Year: 1     Unstable Housing in the Last Year: No        Review of Systems   Constitutional:  Negative for chills and fever.   Respiratory:  Negative for cough and shortness of breath.    Cardiovascular:  Negative for chest pain.   Gastrointestinal:  Negative for nausea and vomiting.   Skin:  Positive for wound.   Neurological:  Positive for numbness. Negative for weakness.         Objective:       Wound 06/02/23 Diabetic Ulcer Heel Left (Active)   Enter Oliveros score: Oliveros Grade 1: Partial or full-thickness ulcer (superficial) 01/04/24 1146   Wound Image Images linked 01/04/24 1157   Wound Description Necrotic;Slough;Yellow;Gray 01/04/24 1146   Lani-wound Assessment Intact 01/04/24 1146   Wound Length (cm) 1.1 cm 01/04/24 1146   Wound Width (cm) 2.5 cm 01/04/24 1146   Wound Depth (cm) 0.8 cm 01/04/24 1146   Wound Surface Area (cm^2) 2.75 cm^2 01/04/24 1146   Wound Volume (cm^3) 2.2 cm^3 01/04/24 1146   Calculated Wound Volume (cm^3) 2.2 cm^3 01/04/24 1146   Undermining 0.4 01/04/24 1146   Undermining is depth extending from 1-5 o'clock 01/04/24 1146   Drainage Amount Moderate 01/04/24 1146   Drainage Description Tan;Foul smelling;Yellow 01/04/24 1146   Non-staged Wound Description Full thickness 01/04/24 1146   Dressing Status Intact 01/04/24 1146       /54   Pulse 68   Temp (!) 97 °F (36.1 °C)   Resp 18     Physical Exam  Vitals and nursing note reviewed.   Constitutional:       General: He is not in acute distress.     Appearance: He is not toxic-appearing or diaphoretic.   Cardiovascular:      Rate and Rhythm: Normal rate and regular rhythm.      Pulses:           Dorsalis pedis pulses are 1+ on the left side.        Posterior tibial pulses are detected w/ Doppler on the left side.   Pulmonary:       Effort: Pulmonary effort is normal. No respiratory distress.   Musculoskeletal:         General: No signs of injury.      Right foot: No foot drop.      Left foot: No foot drop.   Feet:      Comments: Biphasic PTA left.  Skin:     General: Skin is warm.      Capillary Refill: Capillary refill takes less than 2 seconds.      Coloration: Skin is not cyanotic or mottled.      Findings: No abscess or erythema.      Nails: There is no clubbing.      Comments: Ulcer noted left heel.  Wound bed is still fibrous.  No purulence or redness.  No signs of active infection.  Wound depth is closed to periosteal tissues.  See wound assessment and photo.  Thick, elongated toenails X 8.  He has class findings with history of partial toe amputation.     Neurological:      General: No focal deficit present.      Mental Status: He is alert and oriented to person, place, and time.      Cranial Nerves: No cranial nerve deficit.      Sensory: No sensory deficit.      Motor: No weakness.      Coordination: Coordination normal.   Psychiatric:         Mood and Affect: Mood normal.         Behavior: Behavior normal.         Thought Content: Thought content normal.         Judgment: Judgment normal.           Wound Instructions:  Orders Placed This Encounter   Procedures    Debridement Diabetic Ulcer Left Heel     This order was created via procedure documentation    Wound cleansing and dressings Diabetic Ulcer Left Heel     Wound location left heel  Change dressing daily until NPWT ([facility] VAC) is received   The dressing must stay dry and intact. You may shower with dressing protected by cast cover or bag. Or you may sponge bathe.   Moisten gauze with 1/4 strength dakins solution, apply to wound   Cover with gauze/ABD  Secure with rolled gauze and tape.     Supplies were not shipped at last request because it was less than 30 days therefore insurance will not approve. Pt to purchase just enough supplies from local pharmacy to get through  until next week.     Order for [facility] NPWT will be faxed once DPM note completed.     This [facility] applied [facility] at [facility].     Standing Status:   Future     Standing Expiration Date:   1/4/2025    Wound off loading Diabetic Ulcer Left Heel     Continue use of heel relief shoe. Ensure no pressure on wound, offload with heel medic/prevalon or pillows to float wound off surfaces.     Standing Status:   Future     Standing Expiration Date:   1/4/2025    Wound home care Diabetic Ulcer Left Heel     A referral will be sent for [facility] to begin wound care once NPWT is received. Phone: [phone].     Standing Status:   Future     Standing Expiration Date:   1/4/2025    Wound negative pressure wound therapy Diabetic Ulcer Left Heel     Left heel NPWT (initiate once received)   Wound care 3x/wk and prn leakage  Cleanse the wound with nss or wound cleanser, rinse and pat dry  Apply skin prep and VAC film to periwound and area for bridge  Apply black foam to wound  Bridge off and seal at 125mmHg continuous suction.   May use kerlix to help protect dressing from dislodgement but ensure tubing is not compressed against skin on foot or leg.    PATIENT AND SON WERE MADE AWARE THAT THE NPWT MAY NOT BE REC'D UNTIL THE DAY PRIOR TO DR DIONI ISBELL NEXT WEEK, IN THAT CASE THEY ARE TO BRING THE CHARGED UNIT WITH SUPPLIES AND IT WILL BE APPLIED AT THE WOUND CENTER.   Always bring one sponge packet and one canister to wound care appts as those supplies are not stocked in the wound center.     If an issue arises with the NPWT, read the errors on the screen and follow directions. There is also a number to a [facility] clinician on the machine that you may call for troubleshooting during off work hours. If during working hours and unable to clear an error call [facility]. If the NPWT becomes completely inoperable, gently remove the dressing and apply either NSS moistened gauze or 1/4 strength dakins moistened gauze to  wound, cover with dry gauze. Call  [facility].     Standing Status:   Future     Standing Expiration Date:   1/4/2025    Referral to [facility]     Standing Status:   Future     Standing Expiration Date:   1/4/2025     Referral Priority:   Routine     Referral Type:   Home Health     Referral Reason:   Specialty Services Required     Requested Specialty:   Home Health Services     Number of Visits Requested:   1     Expiration Date:   1/4/2025        Diagnosis ICD-10-CM Associated Orders   1. Ulcer of heel, left, with necrosis of muscle ([facility])  L97.423 lidocaine (LMX) 4 % cream     Debridement Diabetic Ulcer Left Heel     Wound cleansing and dressings Diabetic Ulcer Left Heel     Wound off loading Diabetic Ulcer Left Heel     Wound home care Diabetic Ulcer Left Heel     Wound negative pressure wound therapy Diabetic Ulcer Left Heel     Referral to [facility]      2. Type 2 diabetes mellitus with chronic kidney disease on chronic dialysis, without long-term current use of insulin ([facility])  E11.22 lidocaine (LMX) 4 % cream    N18.6 Debridement Diabetic Ulcer Left Heel    Z99.2 Wound cleansing and dressings Diabetic Ulcer Left Heel     Wound off loading Diabetic Ulcer Left Heel     Wound home care Diabetic Ulcer Left Heel     Wound negative pressure wound therapy Diabetic Ulcer Left Heel     Referral to [facility]      3. Onychomycosis  B35.1 Wound cleansing and dressings Diabetic Ulcer Left Heel     Wound off loading Diabetic Ulcer Left Heel     Wound home care Diabetic Ulcer Left Heel     Wound negative pressure wound therapy Diabetic Ulcer Left Heel     Referral to [facility]

## 2024-01-04 NOTE — TELEPHONE ENCOUNTER
Asad Mary diaz  [birth year] .  Doctor . My father, Asad Galloway is a patient of his. My father currently has RSV and the doctors prescribed him amoxicillin (is amoxicillin ok for him to take ?)  and they wanted to know if they be able to prescribe him a cough medication. I wanted to check with doctor sheet before they prescribe him anything. I wanted to make sure that it's safe for him to take amoxicillin and to find out which cough medication is safe for him that is not contained like NSAID's. So if you can please give me a call back. My phone number, [phone] and my father's name is Asad galloway and his YOB: 1960. Thank you.    Spoke to son and advised over counter coricidin hbp, and plain Robitussin  Son would like Dr Tan recommendations     Please advise                                              Please advise

## 2024-01-05 ENCOUNTER — PATIENT OUTREACH (OUTPATIENT)
Dept: INTERNAL MEDICINE CLINIC | Facility: CLINIC | Age: 64
End: 2024-01-05

## 2024-01-05 ENCOUNTER — TELEPHONE (OUTPATIENT)
Dept: WOUND CARE | Facility: HOSPITAL | Age: 64
End: 2024-01-05

## 2024-01-05 ENCOUNTER — HOME HEALTH ADMISSION (OUTPATIENT)
Dept: HOME HEALTH SERVICES | Facility: HOME HEALTHCARE | Age: 64
End: 2024-01-05
Payer: MEDICARE

## 2024-01-05 DIAGNOSIS — Z78.9 NEED FOR FOLLOW-UP BY SOCIAL WORKER: Primary | ICD-10-CM

## 2024-01-05 NOTE — TELEPHONE ENCOUNTER
S/w Sam to advise that I am requesting NPWT to be delivered by 1/10/24, we will apply it at the patient's 1/11/24 visit, [facility] will begin services Sat 1/13/24. Sam verbalized understanding and thanks.

## 2024-01-05 NOTE — PROGRESS NOTES
Reviewed chart referral was ordered for assistance with ADL's at home. To contact son. Patient is also on dialysis.   Will send to [facility] .

## 2024-01-09 ENCOUNTER — TELEPHONE (OUTPATIENT)
Dept: CARDIOLOGY CLINIC | Facility: CLINIC | Age: 64
End: 2024-01-09

## 2024-01-09 ENCOUNTER — OFFICE VISIT (OUTPATIENT)
Dept: ENDOCRINOLOGY | Facility: CLINIC | Age: 64
End: 2024-01-09
Payer: MEDICARE

## 2024-01-09 VITALS — OXYGEN SATURATION: 100 % | WEIGHT: 209 LBS | HEART RATE: 52 BPM | BODY MASS INDEX: 29.92 KG/M2 | HEIGHT: 70 IN

## 2024-01-09 DIAGNOSIS — E11.65 TYPE 2 DIABETES MELLITUS WITH HYPERGLYCEMIA, WITH LONG-TERM CURRENT USE OF INSULIN (HCC): ICD-10-CM

## 2024-01-09 DIAGNOSIS — Z79.4 TYPE 2 DIABETES MELLITUS WITH HYPOGLYCEMIA WITHOUT COMA, WITH LONG-TERM CURRENT USE OF INSULIN (HCC): ICD-10-CM

## 2024-01-09 DIAGNOSIS — Z79.4 TYPE 2 DIABETES MELLITUS WITH HYPERGLYCEMIA, WITH LONG-TERM CURRENT USE OF INSULIN (HCC): ICD-10-CM

## 2024-01-09 DIAGNOSIS — Z99.2 TYPE 2 DIABETES MELLITUS WITH CHRONIC KIDNEY DISEASE ON CHRONIC DIALYSIS, WITHOUT LONG-TERM CURRENT USE OF INSULIN (HCC): Primary | ICD-10-CM

## 2024-01-09 DIAGNOSIS — N18.6 TYPE 2 DIABETES MELLITUS WITH CHRONIC KIDNEY DISEASE ON CHRONIC DIALYSIS, WITHOUT LONG-TERM CURRENT USE OF INSULIN (HCC): Primary | ICD-10-CM

## 2024-01-09 DIAGNOSIS — I50.23 ACUTE ON CHRONIC SYSTOLIC (CONGESTIVE) HEART FAILURE (HCC): ICD-10-CM

## 2024-01-09 DIAGNOSIS — E11.649 TYPE 2 DIABETES MELLITUS WITH HYPOGLYCEMIA WITHOUT COMA, WITH LONG-TERM CURRENT USE OF INSULIN (HCC): ICD-10-CM

## 2024-01-09 DIAGNOSIS — E11.22 TYPE 2 DIABETES MELLITUS WITH CHRONIC KIDNEY DISEASE ON CHRONIC DIALYSIS, WITHOUT LONG-TERM CURRENT USE OF INSULIN (HCC): Primary | ICD-10-CM

## 2024-01-09 PROCEDURE — 83036 HEMOGLOBIN GLYCOSYLATED A1C: CPT | Performed by: PHYSICIAN ASSISTANT

## 2024-01-09 PROCEDURE — 99213 OFFICE O/P EST LOW 20 MIN: CPT | Performed by: PHYSICIAN ASSISTANT

## 2024-01-09 RX ORDER — LANCETS 30 GAUGE
EACH MISCELLANEOUS 3 TIMES DAILY
Qty: 300 EACH | Refills: 0 | Status: ON HOLD | OUTPATIENT
Start: 2024-01-09

## 2024-01-09 RX ORDER — CALCIUM CITRATE/VITAMIN D3 200MG-6.25
1 TABLET ORAL 3 TIMES DAILY
Qty: 300 STRIP | Refills: 1 | Status: ON HOLD | OUTPATIENT
Start: 2024-01-09

## 2024-01-09 NOTE — TELEPHONE ENCOUNTER
Spoke with Sam (pt's son) and relayed message as given per Dr. Tan.    I asked that they bring his bp log to his next office visit on 2/8/24 to see if his bp improves with holding his entresto on dialysis days and to please call if there are any issues in the meantime.     Sam is aware and understood.

## 2024-01-09 NOTE — PROGRESS NOTES
Asad Galloway 63 y.o. male MRN: [id number]    Encounter: [id number]      Assessment/Plan   Problem List Items Addressed This Visit          Endocrine    Type 2 diabetes mellitus with chronic kidney disease on chronic dialysis, without long-term current use of insulin ([facility]) - Primary       Lab Results   Component Value Date    HGBA1C 4.9 04/18/2023   Now appears diet controlled, no further insulin use.   No notable extremes of hyperglycemia or hypoglycemia captured.  Discussed possible need for reintroduction of insulin should BG trends change. Patient is comfortable monitoring BG 3 times daily to ensure stability of trends. Of note, he is hopeful renal transplantation to occur in the next several months. Provided anticipatory guidance that perioperative steroids,  pain, stress may adversely influence BG trends. He will notify us should this occur.   RTC -   Despite reaching A1c target, stability of BG without pharmacotherapy, he has multiple comorbidities. Prefers to continue 6 month endocrinology follow up, which is not unreasonable.         Relevant Medications    glucose blood (True Metrix Blood Glucose Test) test strip    Lancets MISC    Other Relevant Orders    POCT hemoglobin A1c       Cardiovascular and Mediastinum    Acute on chronic systolic (congestive) heart failure (HCC)     Other Visit Diagnoses       Type 2 diabetes mellitus with hyperglycemia, with long-term current use of insulin (HCC)        Relevant Medications    glucose blood (True Metrix Blood Glucose Test) test strip    Lancets MISC    Type 2 diabetes mellitus with hypoglycemia without coma, with long-term current use of insulin ([facility])                 CC: Type 2 Diabetes    History of Present Illness     HPI:  Asad Galloway is a pleasant 63 year old male with type 2 DM c/b ESRD on HD, neuropathy, . His wife is present at today's visit, providing most details of history.    He was previously on basal-bolus insulin regimen, but this was  discontinued entirely within the past year.  This was due to hypoglycemia following insulin administration. Once stopped, this has now resolved entirely. No extremes of BG lows or highs captured, in recent months. No concerning symptoms of hypo- or hyperglycemia.    BG checks are via fingerstick TID AC. Typical BG trends recalled from memory, no formal log today:  FBG 90s   Before lunch - 110 - 120s  Before dinner - 120s   Confirms no values captured > 150 recently.  Record low recently = 83    Dietary choices have been much better - eating a lot of chicken and fish for protein, more mindful of consuming vegetables, avoiding high carbohydrate meals, avoiding greasy/fast food, no sugary beverages. Eating 3 meals a day, good appetite most days.    Asad is also happy to report he has also lost weight, approximately 30lbs over the past 6 months. His wife attributes this to his healthy diet. He is very motivated to optimize his health in the setting of plans to undergo kidney transplant in near future.    A1c in office today is 5.1%.    DM eye exams - up to date, through [facility].  DM foot exam - routinely through podiatry.    For hypertension, he is on ARB (Entresto).   For hyperlipidemia he is on atorvastatin.       Review of Systems   Constitutional:  Positive for fatigue (chronic). Negative for chills and fever.   HENT:  Negative for ear pain and sore throat.    Eyes:  Negative for pain and visual disturbance.   Respiratory:  Negative for cough and shortness of breath.    Cardiovascular:  Negative for chest pain and palpitations.   Gastrointestinal:  Negative for abdominal pain and vomiting.   Genitourinary:  Negative for dysuria and hematuria.   Musculoskeletal:  Negative for arthralgias and back pain.   Skin:  Negative for color change and rash.   Neurological:  Negative for seizures and syncope.   All other systems reviewed and are negative.      Historical Information   Past Medical History:    Diagnosis Date    Cerebrovascular accident (CVA) due to thrombosis of left middle cerebral artery (HCC) 07/29/2018    Chronic kidney disease     Coronary artery disease     Diabetes mellitus (HCC)     not on meds    Dialysis patient (HCC)     M-W-F    Fistula     left upper arm for hemodialyis    GERD (gastroesophageal reflux disease)     History of coronary artery stent placement     x3    Hypercholesteremia     Hyperlipidemia     Hypertension     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    Limb alert care status     no BP/IV left arm    Neuropathy     Obesity     Osteomyelitis (HCC)     last assessed 11/4/16-per son not currently    PVC's (premature ventricular contractions)     sees [facility]    Stroke (HCC)     last weeof July 2018 [facility]    TIA (transient ischemic attack) 10/28/2018    Wears dentures     Wears glasses      Past Surgical History:   Procedure Laterality Date    ABDOMINAL SURGERY      CARDIAC CATHETERIZATION N/A 05/02/2022    Procedure: Cardiac Coronary Angiogram;  Surgeon: Sam Davis MD;  [facility];  Service: Cardiology    CARDIAC CATHETERIZATION N/A 05/02/2022    Procedure: Cardiac pci;  Surgeon: Sam Davis MD;  [facility];  Service: Cardiology    CARDIAC CATHETERIZATION  02/01/2023    Procedure: Cardiac catheterization;  Surgeon: Sam Davis MD;  [facility];  Service: Cardiology    CARDIAC CATHETERIZATION N/A 02/01/2023    Procedure: Cardiac pci;  Surgeon: Sam Davis MD;  [facility];  Service: Cardiology    CARDIAC CATHETERIZATION N/A 02/01/2023    Procedure: Cardiac Coronary Angiogram;  Surgeon: Sam Davis MD;  [facility];  Service: Cardiology    CARDIAC CATHETERIZATION N/A 02/01/2023    Procedure: Cardiac other-IVUS;  Surgeon: Sam Davis MD;  [facility];  Service: Cardiology    CHOLECYSTECTOMY      Percutaneous    COLONOSCOPY      CYSTOSCOPY       "IR LOWER EXTREMITY ANGIOGRAM  9/29/2023    OTHER SURGICAL HISTORY      \"stimulator to control bowel movements\"    NH ESOPHAGOGASTRODUODENOSCOPY TRANSORAL DIAGNOSTIC N/A 09/27/2016    Procedure: ESOPHAGOGASTRODUODENOSCOPY (EGD);  Surgeon: Adele Rowe MD;  Location: [facility] GI [facility];  Service: Gastroenterology    NH LAPAROSCOPY SURG CHOLECYSTECTOMY N/A 02/29/2016    Procedure: LAPAROSCOPIC CHOLECYSTECTOMY ;  Surgeon: Cliff Roman DO;  Location: [facility];  Service: General    NH SLCTV CATHJ 3RD+ ORD SLCTV ABDL PEL/LXTR BRNCH Left 9/29/2023    Procedure: Left leg angiogram with intervention;  Surgeon: Michelle Galvan MD;  Location: [facility];  Service: Vascular    ROTATOR CUFF REPAIR Right     SPINAL CORD STIMULATOR IMPLANT      \"Medtronic interstim model # 3058- in lower back to control bowel movements-currently turned off-battery is dead\"    TOE AMPUTATION Right 10/28/2016    Procedure: 3RD TOE AMPUTATION ;  Surgeon: Anjel Salas DPM;  Location: [facility];  Service:      Social History   Social History     Substance and Sexual Activity   Alcohol Use Never     Social History     Substance and Sexual Activity   Drug Use No     Social History     Tobacco Use   Smoking Status Never   Smokeless Tobacco Never     Family History:   Family History   Problem Relation Age of Onset    Leukemia Mother     Liver disease Mother     Lung cancer Mother         heavy smoker - 3 ppd    Heart disease Father     Liver disease Father     Diabetes type I Father     Multiple myeloma Sister     Breast cancer Sister     Urolithiasis Family     Alcohol abuse Neg Hx     Depression Neg Hx     Drug abuse Neg Hx     Substance Abuse Neg Hx     Mental illness Neg Hx        Meds/Allergies   Current Outpatient Medications   Medication Sig Dispense Refill    amoxicillin (AMOXIL) 400 MG/5ML suspension Take 6.3 mL (500 mg total) by mouth daily for 7 days As directed by your nephrologist 44.1 mL 0    aspirin (ECOTRIN LOW STRENGTH) 81 mg EC " tablet Take 81 mg by mouth daily Resume on 8/14        atorvastatin (LIPITOR) 40 mg tablet Take 1 tablet (40 mg total) by mouth daily with dinner 90 tablet 1    Blood Glucose Monitoring Suppl (True Metrix Meter) w/Device KIT Use to test blood sugars 3 times daily 1 kit 0    Blood Pressure Monitoring (BLOOD PRESSURE CUFF) MISC Use to check blood pressure before taking blood pressure medication and 1 hour after and follow instructions provided in discharge instructions based on the readings. 1 each 0    Cholecalciferol (Vitamin D3) 1.25 MG (36692 UT) CAPS TAKE 1 CAPSULE BY MOUTH ONE TIME PER WEEK 12 capsule 3    divalproex sodium (DEPAKOTE SPRINKLE) 125 MG capsule TAKE 2 CAPSULES (250 MG TOTAL) BY MOUTH EVERY 12 (TWELVE) HOURS 360 capsule 1    fluticasone (FLONASE) 50 mcg/act nasal spray 1 spray into each nostril daily 9.9 mL 0    metoprolol succinate (TOPROL-XL) 50 mg 24 hr tablet Take 1 tablet (50 mg total) by mouth 2 (two) times a day 180 tablet 3    midodrine (PROAMATINE) 2.5 mg tablet Take 1 tablet (2.5 mg total) by mouth as needed (for SBP<90 on dialysis days) 30 tablet 0    ONETOUCH DELICA LANCETS 33G MISC by Does not apply route 3 (three) times a day 270 each 1    prasugrel (EFFIENT) tablet Take 1 tablet (5 mg total) by mouth daily (Patient taking differently: Take 5 mg by mouth daily Takes with pudding) 90 tablet 3    sacubitril-valsartan (Entresto) 24-26 MG TABS Take 1 tablet by mouth in the morning Bedtime 90 tablet 3    Sevelamer Carbonate 2.4 g PACK VIGOROUSLY MIX CONTENTS OF 1 PACKET IN WATER (IT WILL NOT DISSOLVE) AND DRINK 3 TIMES DAILY WITH MEALS      sodium hypochlorite (DAKIN'S HALF-STRENGTH) external solution Apply 1 Application topically every other day At each dressing change 473 mL 0    True Metrix Blood Glucose Test test strip USE 1 EACH 3 TIMES A DAY AS DIRECTED 300 strip 1     No current facility-administered medications for this visit.     No Known Allergies    Objective   Vitals: Pulse (!)  "52, height 5' 10\" (1.778 m), weight 94.8 kg (209 lb), SpO2 100%.    Physical Exam  Vitals reviewed.   Constitutional:       General: He is not in acute distress.     Appearance: He is not ill-appearing.   HENT:      Head: Normocephalic.      Mouth/Throat:      Mouth: Mucous membranes are moist.   Cardiovascular:      Rate and Rhythm: Normal rate.      Heart sounds: Normal heart sounds.   Pulmonary:      Effort: Pulmonary effort is normal.      Breath sounds: Normal breath sounds.   Musculoskeletal:      Right lower leg: Edema present.      Left lower leg: Edema present.      Comments: Left foot in surgical shoe.   Skin:     General: Skin is warm.   Neurological:      Mental Status: He is alert.   Psychiatric:         Mood and Affect: Mood normal.         The history was obtained from the review of the chart, patient.    Lab Results:   Lab Results   Component Value Date/Time    Hemoglobin A1C 4.9 04/18/2023 03:37 PM    WBC 4.58 12/27/2023 05:13 AM    WBC 3.93 (L) 12/25/2023 11:25 PM    WBC 4.94 11/19/2023 10:52 AM    Hemoglobin 9.3 (L) 12/27/2023 05:13 AM    Hemoglobin 9.2 (L) 12/25/2023 11:25 PM    Hemoglobin 10.5 (L) 11/19/2023 10:52 AM    Hematocrit 30.3 (L) 12/27/2023 05:13 AM    Hematocrit 29.9 (L) 12/25/2023 11:25 PM    Hematocrit 33.8 (L) 11/19/2023 10:52 AM     (H) 12/27/2023 05:13 AM     (H) 12/25/2023 11:25 PM     (H) 11/19/2023 10:52 AM    Platelets 92 (L) 12/27/2023 05:13 AM    Platelets 99 (L) 12/25/2023 11:25 PM    Platelets 91 (L) 11/19/2023 10:52 AM    BUN 51 (H) 12/27/2023 05:13 AM    BUN 65 (H) 12/25/2023 11:25 PM    BUN 54 (H) 11/19/2023 10:52 AM    Potassium 4.8 12/27/2023 05:13 AM    Potassium 4.6 12/25/2023 11:25 PM    Potassium 5.2 11/19/2023 10:52 AM    Chloride 97 12/27/2023 05:13 AM    Chloride 97 12/25/2023 11:25 PM    Chloride 97 11/19/2023 10:52 AM    CO2 26 12/27/2023 05:13 AM    CO2 29 12/25/2023 11:25 PM    CO2 32 11/19/2023 10:52 AM    CO2, i-STAT 29 09/29/2023 " "07:39 AM    CO2, i-STAT 25 01/30/2023 03:05 PM    Creatinine 7.82 (H) 12/27/2023 05:13 AM    Creatinine 8.83 (H) 12/25/2023 11:25 PM    Creatinine 7.94 (H) 11/19/2023 10:52 AM    AST 6 (L) 12/27/2023 05:13 AM    AST 7 (L) 12/25/2023 11:25 PM    AST 7 (L) 11/19/2023 10:52 AM    ALT 6 (L) 12/27/2023 05:13 AM    ALT 8 12/25/2023 11:25 PM    ALT 6 (L) 11/19/2023 10:52 AM    Total Protein 6.7 12/27/2023 05:13 AM    Total Protein 7.0 12/25/2023 11:25 PM    Total Protein 7.4 11/19/2023 10:52 AM    Albumin 3.8 12/27/2023 05:13 AM    Albumin 4.0 12/25/2023 11:25 PM    Albumin 4.2 11/19/2023 10:52 AM    HDL, Direct 30 (L) 01/30/2023 04:58 AM    Triglycerides 123 01/30/2023 04:58 AM           Imaging Studies: n/a    Portions of the record may have been created with voice recognition software. Occasional wrong word or \"sound a like\" substitutions may have occurred due to the inherent limitations of voice recognition software. Read the chart carefully and recognize, using context, where substitutions have occurred.    "

## 2024-01-09 NOTE — TELEPHONE ENCOUNTER
----- Message from Brie Candelaria sent at 1/9/2024  7:55 AM EST -----    ----- Message -----  From: Britney Tan MD  Sent: 1/8/2024   4:38 PM EST  To: Card[facility]    Please call the patient or his son and advised the following:    I have spoken to your PCP Dr. Auguste, who expressed concerned about low blood pressure readings.  I recommend holding Entresto on dialysis days and taking only on nondialysis days. Please do so and maintain a blood pressure log so that we can evaluate how your blood pressure is doing on this new regimen.    Thanks,    TS

## 2024-01-10 LAB — SL AMB POCT HEMOGLOBIN AIC: 5.1 (ref ?–6.5)

## 2024-01-11 ENCOUNTER — PATIENT OUTREACH (OUTPATIENT)
Dept: INTERNAL MEDICINE CLINIC | Facility: CLINIC | Age: 64
End: 2024-01-11

## 2024-01-11 ENCOUNTER — TELEPHONE (OUTPATIENT)
Dept: WOUND CARE | Facility: HOSPITAL | Age: 64
End: 2024-01-11

## 2024-01-11 ENCOUNTER — OFFICE VISIT (OUTPATIENT)
Dept: WOUND CARE | Facility: HOSPITAL | Age: 64
End: 2024-01-11
Payer: MEDICARE

## 2024-01-11 VITALS
HEART RATE: 63 BPM | DIASTOLIC BLOOD PRESSURE: 76 MMHG | TEMPERATURE: 97.6 F | SYSTOLIC BLOOD PRESSURE: 139 MMHG | RESPIRATION RATE: 16 BRPM

## 2024-01-11 DIAGNOSIS — L97.423 ULCER OF HEEL, LEFT, WITH NECROSIS OF MUSCLE (HCC): Primary | ICD-10-CM

## 2024-01-11 DIAGNOSIS — E11.22 TYPE 2 DIABETES MELLITUS WITH CHRONIC KIDNEY DISEASE ON CHRONIC DIALYSIS, WITHOUT LONG-TERM CURRENT USE OF INSULIN (HCC): ICD-10-CM

## 2024-01-11 DIAGNOSIS — Z99.2 TYPE 2 DIABETES MELLITUS WITH CHRONIC KIDNEY DISEASE ON CHRONIC DIALYSIS, WITHOUT LONG-TERM CURRENT USE OF INSULIN (HCC): ICD-10-CM

## 2024-01-11 DIAGNOSIS — N18.6 TYPE 2 DIABETES MELLITUS WITH CHRONIC KIDNEY DISEASE ON CHRONIC DIALYSIS, WITHOUT LONG-TERM CURRENT USE OF INSULIN (HCC): ICD-10-CM

## 2024-01-11 PROCEDURE — 11042 DBRDMT SUBQ TIS 1ST 20SQCM/<: CPT | Performed by: PODIATRIST

## 2024-01-11 RX ORDER — LIDOCAINE 40 MG/G
CREAM TOPICAL ONCE
Status: COMPLETED | OUTPATIENT
Start: 2024-01-11 | End: 2024-01-11

## 2024-01-11 RX ADMIN — LIDOCAINE: 40 CREAM TOPICAL at 08:58

## 2024-01-11 NOTE — PROGRESS NOTES
Referral received from OP RNCM Mira Hagen for Outpatient Social Work Care Manager (OP SWCM) to outreach patient's son and assist with care in the home.  OP SWCM previously spoke with patient's son regarding in-home services.  Patient's family started waiver application process but then decided to cancel the application as patient had improved & pt's son was able to provide needed care.    Patient receives dialysis treatments and is active with the [facility].    Call placed to patient's son Sam to assess current needs of patient.  Sam states patient has foot wound that is being treatment but it is healing slowly.  Patient has difficulty walking due to wound and wears a special brace to help with this.  Sam states patient does not need additional assistance in the home with ADLs as patient's family provides assistance with this.  Patient's wife works and Peter & family help to care for patient during the day.  Sam denied need for waiver application to be processed again.    They are looking for companionship services as patient becomes bored at home.  Discussed [facility] and  Visits through [facility].  Sam interested in reviewing this information with patient and patient's family.  Sam does not know if patient will need this as he does not daily visits from family but he requested the information to have if needed.  Email sent to Sam with information for [facility] in [facility], [facility], [employer] [facility] [employer], and [employer].  Denied any additional needs.  Will close case but remain available to assist with future needs.

## 2024-01-11 NOTE — PATIENT INSTRUCTIONS
Orders Placed This Encounter   Procedures    Wound cleansing and dressings Diabetic Ulcer Left Heel     Wound location left heel  Change dressing daily until NPWT ([employer] VAC) is received   The dressing must stay dry and intact. You may shower with dressing protected by cast cover or bag. Or you may sponge bathe.   Moisten gauze with 1/4 strength dakins solution, apply to wound   Cover with gauze/ABD  Secure with rolled gauze and tape.     Pt to purchase just enough supplies from local pharmacy to get through until next week.      This was applied today at the [facility].    Continue use of heel relief shoe. Ensure no pressure on wound, offload with heel medic/prevalon or pillows to float wound off surfaces.    Once [employer] VAC is received:  Left heel NPWT (initiate once received)   Wound care 3x/wk and prn leakage  Cleanse the wound with nss or wound cleanser, rinse and pat dry  Apply skin prep and VAC film to periwound and area for bridge  Apply black foam to wound  Bridge off and seal at 125mmHg continuous suction.   May use kerlix to help protect dressing from dislodgement but ensure tubing is not compressed against skin on foot or leg.    Always bring one sponge packet and one canister to wound care appts as those supplies are not stocked in the wound center.      If an issue arises with the NPWT, read the errors on the screen and follow directions. There is also a number to a [employer] clinician on the machine that you may call for troubleshooting during off work hours. If during working hours and unable to clear an error call [facility]. If the NPWT becomes completely inoperable, gently remove the dressing and apply either NSS moistened gauze or 1/4 strength dakins moistened gauze to wound, cover with dry gauze. Call   [facility].     Standing Status:   Future     Standing Expiration Date:   1/11/2025    Debridement Diabetic Ulcer Left Heel     This order was created via procedure documentation

## 2024-01-11 NOTE — PROGRESS NOTES
"Patient ID: Asad Galloway is a 63 y.o. male Date of Birth 1960     Chief Complaint  Chief Complaint   Patient presents with    Follow Up Wound Care Visit     Left heel, Pt denies any issues or concerns.  Reports wound vac has not arrived yet.        Allergies  Patient has no known allergies.    Assessment:    No problem-specific Assessment & Plan notes found for this encounter.       Diagnoses and all orders for this visit:    Ulcer of heel, left, with necrosis of muscle (HCC)  -     lidocaine (LMX) 4 % cream  -     Wound cleansing and dressings Diabetic Ulcer Left Heel; Future  -     Debridement Diabetic Ulcer Left Heel    Type 2 diabetes mellitus with chronic kidney disease on chronic dialysis, without long-term current use of insulin (HCC)  -     Wound cleansing and dressings Diabetic Ulcer Left Heel; Future  -     Debridement Diabetic Ulcer Left Heel            Debridement   Wound 06/02/23 Diabetic Ulcer Heel Left    Universal Protocol:  Consent: Verbal consent obtained.  Risks and benefits: risks, benefits and alternatives were discussed  Consent given by: patient  Time out: Immediately prior to procedure a \"time out\" was called to verify the correct patient, procedure, equipment, support staff and site/side marked as required.  Timeout called at: 1/11/2024 8:38 AM.  Patient understanding: patient states understanding of the procedure being performed  Patient identity confirmed: verbally with patient    Debridement Details  Performed by: physician  Debridement type: surgical  Level of debridement: subcutaneous tissue  Pain control: lidocaine 4%      Post-debridement measurements  Length (cm): 1.1  Width (cm): 2.3  Depth (cm): 1  Percent debrided: 100%  Surface Area (cm^2): 2.53  Area Debrided (cm^2): 2.53  Volume (cm^3): 2.53    Tissue and other material debrided: dermis, epidermis and subcutaneous tissue  Devitalized tissue debrided: biofilm, fibrin and slough  Instrument(s) utilized: blade  Bleeding: " small  Hemostasis obtained with: pressure  Procedural pain (0-10): 0  Post-procedural pain: 0   Response to treatment: procedure was tolerated well            Plan:  1. Reviewed medical records.  Patient was counseled on the condition and diagnosis.    2. No significant improvement since the last week.  Wound debrided.  Awaits wound VAC delivery.  Dakin solution to the wound daily.    3.  Instructed him to wear Globopedi shoe at all times when ambulating.  Stressed on patient compliance about proper off-loading in bed with Prevalon boot and staying off of his feet.  I told him his ulcer would not heal without proper off-loading and patient compliance.    4. Consider reconsulting vascular surgery if he does not do well with VAC.  5. RA in 1 week.         Wound 06/02/23 Diabetic Ulcer Heel Left (Active)   Wound Image Images linked 01/11/24 0841   Wound Description Slough;Yellow;Pink 01/11/24 0800   Lani-wound Assessment Intact;Fragile 01/11/24 0800   Wound Length (cm) 1.1 cm 01/11/24 0800   Wound Width (cm) 2.2 cm 01/11/24 0800   Wound Depth (cm) 1 cm 01/11/24 0800   Wound Surface Area (cm^2) 2.42 cm^2 01/11/24 0800   Wound Volume (cm^3) 2.42 cm^3 01/11/24 0800   Calculated Wound Volume (cm^3) 2.42 cm^3 01/11/24 0800   Undermining 1 0.3 01/11/24 0800   Undermining 1 is depth extending from 1-5 o'clock 01/11/24 0800   Drainage Amount Moderate 01/11/24 0800   Drainage Description Lee;Yellow 01/11/24 0800   Non-staged Wound Description Full thickness 01/11/24 0800   Patient Tolerance Tolerated well 01/11/24 0800   Dressing Status Removed 01/11/24 0800       Wound 06/02/23 Diabetic Ulcer Heel Left (Active)   Date First Assessed/Time First Assessed: 06/02/23 1908   Primary Wound Type: Diabetic Ulcer  Location: Heel  Wound Location Orientation: Left  Dressing Status: Clean;Dry;Intact       [REMOVED] Wound 01/29/23 Abrasion(s) Pretibial Right (Removed)   Resolved Date/Resolved Time: 12/21/23 0834  Date First Assessed/Time  First Assessed: 01/29/23 1218   Traumatic Wound Type: Abrasion(s)  Location: Pretibial  Wound Location Orientation: Right  Wound Description (Comments): Scabbed  Wound Outcome: Other...       [REMOVED] Wound 02/01/23 Wrist Anterior;Right (Removed)   Resolved Date: 12/26/23  Date First Assessed/Time First Assessed: 02/01/23 1806   Location: Wrist  Wound Location Orientation: Anterior;Right       [REMOVED] Wound 02/01/23 Skin Tear Abrasion(s) Arm Left;Posterior;Proximal (Removed)   Resolved Date: 12/26/23  Date First Assessed/Time First Assessed: 02/01/23 1930   Primary Wound Type: Skin Tear  Traumatic Wound Type: Abrasion(s)  Location: Arm  Wound Location Orientation: Left;Posterior;Proximal       [REMOVED] Wound 09/29/23 Groin Right (Removed)   Resolved Date: 12/26/23  Date First Assessed/Time First Assessed: 09/29/23 0957   Location: Groin  Wound Location Orientation: Right  Wound Description (Comments): New Puncture site noted   Incision's 1st Dressing: ADHESIVE SKIN HIGH VISCOSITY EXOFIN ...       Subjective:          HPI    The patients presents for evaluation of left heel ulcer.  BS under control.  He does not wear Prevalon boot at night.   He walks barefoot around his house where he spends most of his time.        The following portions of the patient's history were reviewed and updated as appropriate: allergies, current medications, past family history, past medical history, past social history, past surgical history, and problem list.      PAST MEDICAL HISTORY:  Past Medical History:   Diagnosis Date    Cerebrovascular accident (CVA) due to thrombosis of left middle cerebral artery (HCC) 07/29/2018    Chronic kidney disease     Coronary artery disease     Diabetes mellitus (HCC)     not on meds    Dialysis patient ([facility])     M-W-F    Fistula     left upper arm for hemodialyis    GERD (gastroesophageal reflux disease)     History of coronary artery stent placement     x3    Hypercholesteremia      "Hyperlipidemia     Hypertension     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    Limb alert care status     no BP/IV left arm    Neuropathy     Obesity     Osteomyelitis (HCC)     last assessed 11/4/16-per son not currently    PVC's (premature ventricular contractions)     sees [facility]    Stroke (HCC)     last weeof July 2018 [facility]    TIA (transient ischemic attack) 10/28/2018    Wears dentures     Wears glasses        PAST SURGICAL HISTORY:  Past Surgical History:   Procedure Laterality Date    ABDOMINAL SURGERY      CARDIAC CATHETERIZATION N/A 05/02/2022    Procedure: Cardiac Coronary Angiogram;  Surgeon: Sam Davis MD;  [facility];  Service: Cardiology    CARDIAC CATHETERIZATION N/A 05/02/2022    Procedure: Cardiac pci;  Surgeon: Sam Davis MD;  [facility];  Service: Cardiology    CARDIAC CATHETERIZATION  02/01/2023    Procedure: Cardiac catheterization;  Surgeon: Sam Davis MD;  [facility];  Service: Cardiology    CARDIAC CATHETERIZATION N/A 02/01/2023    Procedure: Cardiac pci;  Surgeon: Sam Davis MD;  [facility];  Service: Cardiology    CARDIAC CATHETERIZATION N/A 02/01/2023    Procedure: Cardiac Coronary Angiogram;  Surgeon: Sam Davis MD;  [facility];  Service: Cardiology    CARDIAC CATHETERIZATION N/A 02/01/2023    Procedure: Cardiac other-IVUS;  Surgeon: Sam Davis MD;  [facility];  Service: Cardiology    CHOLECYSTECTOMY      Percutaneous    COLONOSCOPY      CYSTOSCOPY      IR LOWER EXTREMITY ANGIOGRAM  9/29/2023    OTHER SURGICAL HISTORY      \"stimulator to control bowel movements\"    DE ESOPHAGOGASTRODUODENOSCOPY TRANSORAL DIAGNOSTIC N/A 09/27/2016    Procedure: ESOPHAGOGASTRODUODENOSCOPY (EGD);  Surgeon: Adele Rowe MD;  Location: [facility] GI LAB;  Service: Gastroenterology    DE LAPAROSCOPY SURG CHOLECYSTECTOMY N/A 02/29/2016    Procedure: LAPAROSCOPIC " "CHOLECYSTECTOMY ;  Surgeon: Cliff Roman DO;  Location: [facility];  Service: General    WY SLCTV CATHJ 3RD+ ORD SLCTV ABDL PEL/LXTR BRNCH Left 9/29/2023    Procedure: Left leg angiogram with intervention;  Surgeon: Michelle Galvan MD;  Location: [facility];  Service: Vascular    ROTATOR CUFF REPAIR Right     SPINAL CORD STIMULATOR IMPLANT      \"Medtronic interstim model # 3058- in lower back to control bowel movements-currently turned off-battery is dead\"    TOE AMPUTATION Right 10/28/2016    Procedure: 3RD TOE AMPUTATION ;  Surgeon: Anjel Salas DPM;  Location: [facility];  Service:         ALLERGIES:  Patient has no known allergies.    MEDICATIONS:  Current Outpatient Medications   Medication Sig Dispense Refill    amoxicillin (AMOXIL) 400 MG/5ML suspension Take 6.3 mL (500 mg total) by mouth daily for 7 days As directed by your nephrologist 44.1 mL 0    aspirin (ECOTRIN LOW STRENGTH) 81 mg EC tablet Take 81 mg by mouth daily Resume on 8/14        atorvastatin (LIPITOR) 40 mg tablet Take 1 tablet (40 mg total) by mouth daily with dinner 90 tablet 1    Blood Glucose Monitoring Suppl (True Metrix Meter) w/Device KIT Use to test blood sugars 3 times daily 1 kit 0    Blood Pressure Monitoring (BLOOD PRESSURE CUFF) MISC Use to check blood pressure before taking blood pressure medication and 1 hour after and follow instructions provided in discharge instructions based on the readings. 1 each 0    Cholecalciferol (Vitamin D3) 1.25 MG (01945 UT) CAPS TAKE 1 CAPSULE BY MOUTH ONE TIME PER WEEK 12 capsule 3    divalproex sodium (DEPAKOTE SPRINKLE) 125 MG capsule TAKE 2 CAPSULES (250 MG TOTAL) BY MOUTH EVERY 12 (TWELVE) HOURS 360 capsule 1    fluticasone (FLONASE) 50 mcg/act nasal spray 1 spray into each nostril daily 9.9 mL 0    glucose blood (True Metrix Blood Glucose Test) test strip Use 1 each 3 (three) times a day Use as instructed 300 strip 1    Lancets MISC Use 3 (three) times a day Use 3 times daily " 300 each 0    metoprolol succinate (TOPROL-XL) 50 mg 24 hr tablet Take 1 tablet (50 mg total) by mouth 2 (two) times a day 180 tablet 3    midodrine (PROAMATINE) 2.5 mg tablet Take 1 tablet (2.5 mg total) by mouth as needed (for SBP<90 on dialysis days) 30 tablet 0    prasugrel (EFFIENT) tablet Take 1 tablet (5 mg total) by mouth daily (Patient taking differently: Take 5 mg by mouth daily Takes with pudding) 90 tablet 3    sacubitril-valsartan (Entresto) 24-26 MG TABS Take 1 tablet by mouth in the morning Bedtime 90 tablet 3    Sevelamer Carbonate 2.4 g PACK VIGOROUSLY MIX CONTENTS OF 1 PACKET IN WATER (IT WILL NOT DISSOLVE) AND DRINK 3 TIMES DAILY WITH MEALS      sodium hypochlorite (DAKIN'S HALF-STRENGTH) external solution Apply 1 Application topically every other day At each dressing change 473 mL 0     No current facility-administered medications for this visit.       SOCIAL HISTORY:  Social History     Socioeconomic History    Marital status: /Civil Union     Spouse name: Not on file    Number of children: Not on file    Years of education: Not on file    Highest education level: Not asked   Occupational History    Occupation: disabled   Tobacco Use    Smoking status: Never    Smokeless tobacco: Never   Vaping Use    Vaping status: Never Used   Substance and Sexual Activity    Alcohol use: Never    Drug use: No    Sexual activity: Not on file     Comment: defer   Other Topics Concern    Not on file   Social History Narrative    Daily caffeine consumption 2-3 servings a day     Social Determinants of Health     Financial Resource Strain: Patient Declined (7/13/2023)    Overall Financial Resource Strain (CARDIA)     Difficulty of Paying Living Expenses: Patient declined   Food Insecurity: No Food Insecurity (2/1/2023)    Hunger Vital Sign     Worried About Running Out of Food in the Last Year: Never true     Ran Out of Food in the Last Year: Never true   Transportation Needs: No Transportation Needs  (7/13/2023)    PRAPARE - Transportation     Lack of Transportation (Medical): No     Lack of Transportation (Non-Medical): No   Physical Activity: Not on file   Stress: No Stress Concern Present (1/18/2019)    [employer] - Occupational Stress Questionnaire     Feeling of Stress : Only a little   Social Connections: Unknown (1/18/2019)    Social Connection and Isolation Panel [NHANES]     Frequency of Communication with Friends and Family: Not on file     Frequency of Social Gatherings with Friends and Family: Not on file     Attends Orthodox Services: Not on file     Active Member of Clubs or Organizations: Not on file     Attends Club or Organization Meetings: Not on file     Marital Status:    Intimate Partner Violence: Not At Risk (1/18/2019)    Humiliation, Afraid, Rape, and Kick questionnaire     Fear of Current or Ex-Partner: No     Emotionally Abused: No     Physically Abused: No     Sexually Abused: No   Housing Stability: Unknown (2/1/2023)    Housing Stability Vital Sign     Unable to Pay for Housing in the Last Year: No     Number of Places Lived in the Last Year: Not on file     Unstable Housing in the Last Year: No        Review of Systems   Constitutional:  Negative for chills and fever.   Respiratory:  Negative for cough and shortness of breath.    Cardiovascular:  Negative for chest pain.   Gastrointestinal:  Negative for nausea and vomiting.   Skin:  Positive for wound.   Neurological:  Positive for numbness. Negative for weakness.         Objective:       Wound 06/02/23 Diabetic Ulcer Heel Left (Active)   Wound Image Images linked 01/11/24 0841   Wound Description Slough;Yellow;Pink 01/11/24 0800   Lani-wound Assessment Intact;Fragile 01/11/24 0800   Wound Length (cm) 1.1 cm 01/11/24 0800   Wound Width (cm) 2.2 cm 01/11/24 0800   Wound Depth (cm) 1 cm 01/11/24 0800   Wound Surface Area (cm^2) 2.42 cm^2 01/11/24 0800   Wound Volume (cm^3) 2.42 cm^3 01/11/24 0800    Calculated Wound Volume (cm^3) 2.42 cm^3 01/11/24 0800   Undermining 1 0.3 01/11/24 0800   Undermining 1 is depth extending from 1-5 o'clock 01/11/24 0800   Drainage Amount Moderate 01/11/24 0800   Drainage Description Lee;Yellow 01/11/24 0800   Non-staged Wound Description Full thickness 01/11/24 0800   Patient Tolerance Tolerated well 01/11/24 0800   Dressing Status Removed 01/11/24 0800       /76   Pulse 63   Temp 97.6 °F (36.4 °C) (Temporal)   Resp 16     Physical Exam  Vitals and nursing note reviewed.   Constitutional:       General: He is not in acute distress.     Appearance: He is not toxic-appearing or diaphoretic.   Cardiovascular:      Rate and Rhythm: Normal rate and regular rhythm.      Pulses:           Dorsalis pedis pulses are 1+ on the left side.        Posterior tibial pulses are detected w/ Doppler on the left side.   Pulmonary:      Effort: Pulmonary effort is normal. No respiratory distress.   Musculoskeletal:         General: No signs of injury.      Right foot: No foot drop.      Left foot: No foot drop.   Feet:      Comments: Biphasic PTA left.  Skin:     General: Skin is warm.      Capillary Refill: Capillary refill takes less than 2 seconds.      Coloration: Skin is not cyanotic or mottled.      Findings: No abscess or erythema.      Nails: There is no clubbing.      Comments: Ulcer noted left heel.  Wound bed is fibrous.  No purulence or redness.  No signs of active infection.  Wound depth is closed to periosteal tissues.  See wound assessment and photo.     Neurological:      General: No focal deficit present.      Mental Status: He is alert and oriented to person, place, and time.      Cranial Nerves: No cranial nerve deficit.      Sensory: No sensory deficit.      Motor: No weakness.      Coordination: Coordination normal.   Psychiatric:         Mood and Affect: Mood normal.         Behavior: Behavior normal.         Thought Content: Thought content normal.         Judgment:  Judgment normal.           Wound Instructions:  Orders Placed This Encounter   Procedures    Wound cleansing and dressings Diabetic Ulcer Left Heel     Wound location left heel  Change dressing daily until NPWT ([facility] VAC) is received   The dressing must stay dry and intact. You may shower with dressing protected by cast cover or bag. Or you may sponge bathe.   Moisten gauze with 1/4 strength dakins solution, apply to wound   Cover with gauze/ABD  Secure with rolled gauze and tape.     Pt to purchase just enough supplies from local pharmacy to get through until next week.      This was applied today at the [facility].    Continue use of heel relief shoe. Ensure no pressure on wound, offload with heel medic/prevalon or pillows to float wound off surfaces.    Once [employer] VAC is received:  Left heel NPWT (initiate once received)   Wound care 3x/wk and prn leakage  Cleanse the wound with nss or wound cleanser, rinse and pat dry  Apply skin prep and VAC film to periwound and area for bridge  Apply black foam to wound  Bridge off and seal at 125mmHg continuous suction.   May use kerlix to help protect dressing from dislodgement but ensure tubing is not compressed against skin on foot or leg.    Always bring one sponge packet and one canister to wound care appts as those supplies are not stocked in the wound center.      If an issue arises with the NPWT, read the errors on the screen and follow directions. There is also a number to a [employer] clinician on the machine that you may call for troubleshooting during off work hours. If during working hours and unable to clear an error call [facility]. If the NPWT becomes completely inoperable, gently remove the dressing and apply either NSS moistened gauze or 1/4 strength dakins moistened gauze to wound, cover with dry gauze. Call   [facility].     Standing Status:   Future     Standing Expiration Date:   1/11/2025    Debridement Diabetic Ulcer Left Heel     This order was created via procedure  documentation        Diagnosis ICD-10-CM Associated Orders   1. Ulcer of heel, left, with necrosis of muscle ([facility])  L97.423 lidocaine (LMX) 4 % cream     Wound cleansing and dressings Diabetic Ulcer Left Heel     Debridement Diabetic Ulcer Left Heel      2. Type 2 diabetes mellitus with chronic kidney disease on chronic dialysis, without long-term current use of insulin ([facility])  E11.22 Wound cleansing and dressings Diabetic Ulcer Left Heel    N18.6 Debridement Diabetic Ulcer Left Heel    Z99.2

## 2024-01-11 NOTE — TELEPHONE ENCOUNTER
Spoke w/ Keke from [facility] confirmed provider's NPI for processing VAC paperwork. VAC being delivered to pt's home today. [facility] confirmed they will go Sat to do VAC dressing change. Called Sam to inform of same, instructed to open VAC box and plug in to charge for [facility] to apply on Sat. Sam verbalize understanding and will update pt of same as well.

## 2024-01-13 ENCOUNTER — HOME CARE VISIT (OUTPATIENT)
Dept: HOME HEALTH SERVICES | Facility: HOME HEALTHCARE | Age: 64
End: 2024-01-13
Payer: MEDICARE

## 2024-01-13 VITALS
TEMPERATURE: 98.1 F | HEART RATE: 68 BPM | WEIGHT: 209 LBS | BODY MASS INDEX: 33.59 KG/M2 | DIASTOLIC BLOOD PRESSURE: 60 MMHG | HEIGHT: 66 IN | RESPIRATION RATE: 18 BRPM | OXYGEN SATURATION: 98 % | SYSTOLIC BLOOD PRESSURE: 108 MMHG

## 2024-01-13 PROCEDURE — G0299 HHS/HOSPICE OF RN EA 15 MIN: HCPCS

## 2024-01-13 PROCEDURE — 400013 VN SOC

## 2024-01-13 PROCEDURE — 10330081 VN NO-PAY CLAIM PROCEDURE

## 2024-01-14 NOTE — CASE COMMUNICATION
Ship to  Pt. Home     Branch:  [facility]   Ordering MD: Franklin Church DPM (home health only)    Wound 1 left heel      Full    Frequency x3 weekly      All items are ordered by the each unless otherwise noted.  Orders should be for a 2 week period (unless noted by insurance)    Cleansers  [place] 353338-2   Saline 100ml 503196 -1  Gauze 4x4 N/S 200 4x4s per unit  [id number] -1  Gauze Kerlix (fluff roll) 4inch sterile 127892- 7  ABD 5x9 [id number]  -4  Transpore white  2in 097002- 1

## 2024-01-14 NOTE — CASE COMMUNICATION
[facility] has admitted your patient to Home Health service with the following disciplines:      SN  This report is informational only, no response is needed  Primary focus of home health care- Integumentary   Patient stated goals of care- left heel wound to heal.  Anticipated visit pattern and next visit date- Next SN visit for 1/16 1w1 2w2 3w4 2PRN   See medication list - meds in home differ from [facility]- Son organizes medication.  Me dication bottles not available at [facility]. SN reviewed list of current medications with wife sent by son via text.   Significant clinical findings- Wound care applied at SOC. Wound vac machine and supplies in home.    Potential barriers to goal achievement- pt can not perform wound care independently.  Wife educated on wet to dry dressing if wound vac malfunctions.      Thank you for allowing us to participate in the care of your patient.       Chandni Angeles RN

## 2024-01-15 ENCOUNTER — HOME CARE VISIT (OUTPATIENT)
Dept: HOME HEALTH SERVICES | Facility: HOME HEALTHCARE | Age: 64
End: 2024-01-15
Payer: MEDICARE

## 2024-01-15 PROCEDURE — 10330064 PAD, ABD 5X9" STR LF (1/PK 20PK/BX) MGM1

## 2024-01-15 PROCEDURE — G0299 HHS/HOSPICE OF RN EA 15 MIN: HCPCS

## 2024-01-15 PROCEDURE — 10330064 TAPE, ADHSV TRANSPORE WHT 2" (6RL/BX 10B

## 2024-01-15 PROCEDURE — 10330064 CLEANSER, WND SEA-CLEANS 6OZ  COLPLT

## 2024-01-15 PROCEDURE — 10330064 SPONGE, GAUZE 8PLY N/S 4"X4" (200/PK 20P

## 2024-01-15 PROCEDURE — 10330064 BANDAGE, GAUZE COTTON  6PLY STR 4"X4YDS

## 2024-01-15 NOTE — Clinical Note
Hello   PT had a prn vs on 01/16/2024 at 1022pm due to vac dressing complications. Disc was removed and pulled dressing apart. SN made vs...pt has foul odor from wound. Pt states he is feeling awful and wants to die as he cried to wife and sn. Wife states he had dialysis today and they removed 10 lbs from him which she states is very large amount...SN used qtip and yellow slough noted in bed of wound no drainage noted. tender to touch. vac dressing applied and therapy resumed at 125mmgh. Pts wife states he will go to Dr on thursday for wound evaluation and debridement. PT afebrile. pt very fatigued and in bed during wound care.

## 2024-01-16 ENCOUNTER — HOME CARE VISIT (OUTPATIENT)
Dept: HOME HEALTH SERVICES | Facility: HOME HEALTHCARE | Age: 64
End: 2024-01-16
Payer: MEDICARE

## 2024-01-16 NOTE — HOME HEALTH
PT had a prn vs on 01/16/2024 at 1022pm due to vac dressing complications. Disc was removed and pulled dressing apart. SN made vs...pt has foul odor from wound. Pt states he is feeling awful and wants to die as he cried to wife and sn. Wife states he had dialysis today and they removed 10 lbs from him which she states is very large amount...SN used qtip and yellow slough noted in bed of wound no drainage noted. tender to touch. vac dressing applied and therapy resumed at 125mmgh. Pts wife states he will go to Dr on thursday for wound evaluation and debridement. PT afebrile. pt very fatigued and in bed during wound care. Communication note sent to MIKE and .

## 2024-01-18 ENCOUNTER — OFFICE VISIT (OUTPATIENT)
Dept: WOUND CARE | Facility: HOSPITAL | Age: 64
DRG: 638 | End: 2024-01-18
Payer: MEDICARE

## 2024-01-18 ENCOUNTER — PATIENT OUTREACH (OUTPATIENT)
Dept: INTERNAL MEDICINE CLINIC | Facility: CLINIC | Age: 64
End: 2024-01-18

## 2024-01-18 ENCOUNTER — HOME CARE VISIT (OUTPATIENT)
Dept: HOME HEALTH SERVICES | Facility: HOME HEALTHCARE | Age: 64
End: 2024-01-18
Payer: MEDICARE

## 2024-01-18 ENCOUNTER — APPOINTMENT (INPATIENT)
Dept: NON INVASIVE DIAGNOSTICS | Facility: HOSPITAL | Age: 64
DRG: 638 | End: 2024-01-18
Payer: MEDICARE

## 2024-01-18 ENCOUNTER — APPOINTMENT (EMERGENCY)
Dept: RADIOLOGY | Facility: HOSPITAL | Age: 64
DRG: 638 | End: 2024-01-18
Payer: MEDICARE

## 2024-01-18 ENCOUNTER — HOSPITAL ENCOUNTER (INPATIENT)
Facility: HOSPITAL | Age: 64
LOS: 6 days | Discharge: HOME HEALTH | DRG: 638 | End: 2024-01-24
Attending: STUDENT IN AN ORGANIZED HEALTH CARE EDUCATION/TRAINING PROGRAM | Admitting: STUDENT IN AN ORGANIZED HEALTH CARE EDUCATION/TRAINING PROGRAM
Payer: MEDICARE

## 2024-01-18 VITALS
HEART RATE: 57 BPM | DIASTOLIC BLOOD PRESSURE: 56 MMHG | RESPIRATION RATE: 18 BRPM | TEMPERATURE: 96.7 F | SYSTOLIC BLOOD PRESSURE: 120 MMHG

## 2024-01-18 DIAGNOSIS — N18.6 TYPE 2 DIABETES MELLITUS WITH CHRONIC KIDNEY DISEASE ON CHRONIC DIALYSIS, WITHOUT LONG-TERM CURRENT USE OF INSULIN (HCC): ICD-10-CM

## 2024-01-18 DIAGNOSIS — Z99.2 ESRD ON DIALYSIS (HCC): ICD-10-CM

## 2024-01-18 DIAGNOSIS — L97.409 NONHEALING ULCER OF HEEL (HCC): ICD-10-CM

## 2024-01-18 DIAGNOSIS — I73.9 PERIPHERAL ARTERIAL DISEASE (HCC): ICD-10-CM
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
## 2024-01-18 DIAGNOSIS — I73.9 PAD (PERIPHERAL ARTERY DISEASE) (HCC): ICD-10-CM

## 2024-01-18 DIAGNOSIS — Z99.2 TYPE 2 DIABETES MELLITUS WITH CHRONIC KIDNEY DISEASE ON CHRONIC DIALYSIS, WITHOUT LONG-TERM CURRENT USE OF INSULIN (HCC): ICD-10-CM

## 2024-01-18 DIAGNOSIS — Z86.73 HISTORY OF STROKE: ICD-10-CM

## 2024-01-18 DIAGNOSIS — L97.423 ULCER OF HEEL, LEFT, WITH NECROSIS OF MUSCLE (HCC): Primary | ICD-10-CM

## 2024-01-18 DIAGNOSIS — E11.22 TYPE 2 DIABETES MELLITUS WITH CHRONIC KIDNEY DISEASE ON CHRONIC DIALYSIS, WITHOUT LONG-TERM CURRENT USE OF INSULIN (HCC): ICD-10-CM

## 2024-01-18 DIAGNOSIS — N18.6 ESRD ON DIALYSIS (HCC): ICD-10-CM

## 2024-01-18 DIAGNOSIS — L98.499 NON-HEALING ULCER (HCC): Primary | ICD-10-CM

## 2024-01-18 LAB
BASOPHILS # BLD AUTO: 0.03 THOUSANDS/ÂΜL (ref 0–0.1)
BASOPHILS NFR BLD AUTO: 1 % (ref 0–1)
EOSINOPHIL # BLD AUTO: 0.09 THOUSAND/ÂΜL (ref 0–0.61)
EOSINOPHIL NFR BLD AUTO: 2 % (ref 0–6)
ERYTHROCYTE [DISTWIDTH] IN BLOOD BY AUTOMATED COUNT: 15.2 % (ref 11.6–15.1)
GLUCOSE SERPL-MCNC: 128 MG/DL (ref 65–140)
GLUCOSE SERPL-MCNC: 97 MG/DL (ref 65–140)
HCT VFR BLD AUTO: 33.7 % (ref 36.5–49.3)
HGB BLD-MCNC: 10.1 G/DL (ref 12–17)
IMM GRANULOCYTES # BLD AUTO: 0.03 THOUSAND/UL (ref 0–0.2)
IMM GRANULOCYTES NFR BLD AUTO: 1 % (ref 0–2)
LYMPHOCYTES # BLD AUTO: 0.86 THOUSANDS/ÂΜL (ref 0.6–4.47)
LYMPHOCYTES NFR BLD AUTO: 20 % (ref 14–44)
MCH RBC QN AUTO: 30.8 PG (ref 26.8–34.3)
MCHC RBC AUTO-ENTMCNC: 30 G/DL (ref 31.4–37.4)
MCV RBC AUTO: 103 FL (ref 82–98)
MONOCYTES # BLD AUTO: 0.39 THOUSAND/ÂΜL (ref 0.17–1.22)
MONOCYTES NFR BLD AUTO: 9 % (ref 4–12)
NEUTROPHILS # BLD AUTO: 2.98 THOUSANDS/ÂΜL (ref 1.85–7.62)
NEUTS SEG NFR BLD AUTO: 67 % (ref 43–75)
NRBC BLD AUTO-RTO: 0 /100 WBCS
PLATELET # BLD AUTO: 99 THOUSANDS/UL (ref 149–390)
PMV BLD AUTO: 12.1 FL (ref 8.9–12.7)
RBC # BLD AUTO: 3.28 MILLION/UL (ref 3.88–5.62)
WBC # BLD AUTO: 4.38 THOUSAND/UL (ref 4.31–10.16)

## 2024-01-18 PROCEDURE — 87075 CULTR BACTERIA EXCEPT BLOOD: CPT

## 2024-01-18 PROCEDURE — 87077 CULTURE AEROBIC IDENTIFY: CPT

## 2024-01-18 PROCEDURE — 99284 EMERGENCY DEPT VISIT MOD MDM: CPT | Performed by: PODIATRIST

## 2024-01-18 PROCEDURE — 93925 LOWER EXTREMITY STUDY: CPT

## 2024-01-18 PROCEDURE — 93922 UPR/L XTREMITY ART 2 LEVELS: CPT | Performed by: SURGERY

## 2024-01-18 PROCEDURE — 93925 LOWER EXTREMITY STUDY: CPT | Performed by: SURGERY

## 2024-01-18 PROCEDURE — 99283 EMERGENCY DEPT VISIT LOW MDM: CPT | Performed by: INTERNAL MEDICINE

## 2024-01-18 PROCEDURE — 85025 COMPLETE CBC W/AUTO DIFF WBC: CPT | Performed by: STUDENT IN AN ORGANIZED HEALTH CARE EDUCATION/TRAINING PROGRAM

## 2024-01-18 PROCEDURE — 36415 COLL VENOUS BLD VENIPUNCTURE: CPT | Performed by: STUDENT IN AN ORGANIZED HEALTH CARE EDUCATION/TRAINING PROGRAM

## 2024-01-18 PROCEDURE — 87185 SC STD ENZYME DETCJ PER NZM: CPT

## 2024-01-18 PROCEDURE — 87070 CULTURE OTHR SPECIMN AEROBIC: CPT

## 2024-01-18 PROCEDURE — NC001 PR NO CHARGE: Performed by: EMERGENCY MEDICINE

## 2024-01-18 PROCEDURE — 99284 EMERGENCY DEPT VISIT MOD MDM: CPT

## 2024-01-18 PROCEDURE — 99213 OFFICE O/P EST LOW 20 MIN: CPT | Performed by: PODIATRIST

## 2024-01-18 PROCEDURE — 73630 X-RAY EXAM OF FOOT: CPT

## 2024-01-18 PROCEDURE — 93923 UPR/LXTR ART STDY 3+ LVLS: CPT

## 2024-01-18 PROCEDURE — 87205 SMEAR GRAM STAIN: CPT

## 2024-01-18 PROCEDURE — 99223 1ST HOSP IP/OBS HIGH 75: CPT | Performed by: SURGERY

## 2024-01-18 PROCEDURE — 82948 REAGENT STRIP/BLOOD GLUCOSE: CPT

## 2024-01-18 PROCEDURE — NC001 PR NO CHARGE: Performed by: PODIATRIST

## 2024-01-18 PROCEDURE — 87186 SC STD MICRODIL/AGAR DIL: CPT

## 2024-01-18 PROCEDURE — 73650 X-RAY EXAM OF HEEL: CPT

## 2024-01-18 PROCEDURE — 80053 COMPREHEN METABOLIC PANEL: CPT | Performed by: STUDENT IN AN ORGANIZED HEALTH CARE EDUCATION/TRAINING PROGRAM

## 2024-01-18 PROCEDURE — 99222 1ST HOSP IP/OBS MODERATE 55: CPT | Performed by: STUDENT IN AN ORGANIZED HEALTH CARE EDUCATION/TRAINING PROGRAM

## 2024-01-18 RX ORDER — DIVALPROEX SODIUM 125 MG/1
250 CAPSULE, COATED PELLETS ORAL EVERY 12 HOURS SCHEDULED
Status: DISCONTINUED | OUTPATIENT
Start: 2024-01-18 | End: 2024-01-22

## 2024-01-18 RX ORDER — SODIUM HYPOCHLORITE 1.25 MG/ML
SOLUTION TOPICAL DAILY
Status: DISCONTINUED | OUTPATIENT
Start: 2024-01-19 | End: 2024-01-24 | Stop reason: HOSPADM

## 2024-01-18 RX ORDER — INSULIN LISPRO 100 [IU]/ML
1-6 INJECTION, SOLUTION INTRAVENOUS; SUBCUTANEOUS
Status: DISCONTINUED | OUTPATIENT
Start: 2024-01-18 | End: 2024-01-24 | Stop reason: HOSPADM

## 2024-01-18 RX ORDER — OXYCODONE HYDROCHLORIDE 5 MG/1
5 TABLET ORAL 3 TIMES DAILY PRN
Status: DISCONTINUED | OUTPATIENT
Start: 2024-01-18 | End: 2024-01-24 | Stop reason: HOSPADM

## 2024-01-18 RX ORDER — ATORVASTATIN CALCIUM 40 MG/1
40 TABLET, FILM COATED ORAL
Status: DISCONTINUED | OUTPATIENT
Start: 2024-01-18 | End: 2024-01-24 | Stop reason: HOSPADM

## 2024-01-18 RX ORDER — ONDANSETRON 2 MG/ML
4 INJECTION INTRAMUSCULAR; INTRAVENOUS EVERY 6 HOURS PRN
Status: DISCONTINUED | OUTPATIENT
Start: 2024-01-18 | End: 2024-01-24 | Stop reason: HOSPADM

## 2024-01-18 RX ORDER — ACETAMINOPHEN 325 MG/1
650 TABLET ORAL EVERY 8 HOURS SCHEDULED
Status: DISCONTINUED | OUTPATIENT
Start: 2024-01-18 | End: 2024-01-24 | Stop reason: HOSPADM

## 2024-01-18 RX ORDER — HEPARIN SODIUM 5000 [USP'U]/ML
5000 INJECTION, SOLUTION INTRAVENOUS; SUBCUTANEOUS EVERY 8 HOURS SCHEDULED
Status: DISCONTINUED | OUTPATIENT
Start: 2024-01-18 | End: 2024-01-24 | Stop reason: HOSPADM

## 2024-01-18 RX ORDER — PRASUGREL 10 MG/1
5 TABLET, FILM COATED ORAL DAILY
Status: DISCONTINUED | OUTPATIENT
Start: 2024-01-18 | End: 2024-01-24 | Stop reason: HOSPADM

## 2024-01-18 RX ORDER — LIDOCAINE 40 MG/G
CREAM TOPICAL ONCE
Status: DISCONTINUED | OUTPATIENT
Start: 2024-01-18 | End: 2024-01-18

## 2024-01-18 RX ORDER — METOPROLOL SUCCINATE 50 MG/1
50 TABLET, EXTENDED RELEASE ORAL 2 TIMES DAILY
Status: DISCONTINUED | OUTPATIENT
Start: 2024-01-18 | End: 2024-01-24 | Stop reason: HOSPADM

## 2024-01-18 RX ORDER — MIDODRINE HYDROCHLORIDE 2.5 MG/1
2.5 TABLET ORAL
Status: DISCONTINUED | OUTPATIENT
Start: 2024-01-18 | End: 2024-01-19

## 2024-01-18 RX ADMIN — ACETAMINOPHEN 650 MG: 325 TABLET, FILM COATED ORAL at 15:48

## 2024-01-18 RX ADMIN — ASPIRIN 81 MG: 81 TABLET, COATED ORAL at 15:47

## 2024-01-18 RX ADMIN — HEPARIN SODIUM 5000 UNITS: 5000 INJECTION INTRAVENOUS; SUBCUTANEOUS at 15:49

## 2024-01-18 RX ADMIN — ATORVASTATIN CALCIUM 40 MG: 40 TABLET, FILM COATED ORAL at 15:48

## 2024-01-18 RX ADMIN — HEPARIN SODIUM 5000 UNITS: 5000 INJECTION INTRAVENOUS; SUBCUTANEOUS at 21:35

## 2024-01-18 RX ADMIN — LIDOCAINE 1 APPLICATION: 40 CREAM TOPICAL at 10:15

## 2024-01-18 RX ADMIN — ACETAMINOPHEN 650 MG: 325 TABLET, FILM COATED ORAL at 21:34

## 2024-01-18 RX ADMIN — DIVALPROEX SODIUM 250 MG: 125 CAPSULE, COATED PELLETS ORAL at 21:34

## 2024-01-18 RX ADMIN — CEFEPIME 1000 MG: 1 INJECTION, POWDER, FOR SOLUTION INTRAMUSCULAR; INTRAVENOUS at 15:50

## 2024-01-18 RX ADMIN — PRASUGREL 5 MG: 10 TABLET, FILM COATED ORAL at 15:46

## 2024-01-18 RX ADMIN — OXYCODONE HYDROCHLORIDE 5 MG: 5 TABLET ORAL at 18:54

## 2024-01-18 NOTE — ASSESSMENT & PLAN NOTE
Peripheral artery disease status post LLE a gram with balloon angioplasty of PT and AT 9/2023.  Presented with nonhealing left heel ulcer as above.  Follow-up LEADs.  Consult vascular surgery.  Continue aspirin and statin.  Podiatry following.

## 2024-01-18 NOTE — H&P
[facility]  H&P  Name: Asad Galloway 63 y.o. male I MRN: [id number]  Unit/Bed#: ED 06 I Date of Admission: 1/18/2024   Date of Service: 1/18/2024 I Hospital Day: 0      Assessment/Plan   * Nonhealing ulcer of heel (HCC)  Assessment & Plan  Chronic nonhealing ulcer of the left heel, positive probe to bone concerning for clinical osteomyelitis.  Patient afebrile, no leukocytosis and hemodynamically stable.  Follows with podiatry, was sent from the office for direct admission.  Continue local wound care lo podiatry.  May require debridement in the OR.  Foot x-rays pending.  LEADs pending.  Vascular surgery consult.  Start renally adjusted dose cefepime.  Follow wound cultures.  Consider ID consult pending clinical course.    PAD (peripheral artery disease) ([facility])  Assessment & Plan  Peripheral artery disease status post LLE a gram with balloon angioplasty of PT and AT 9/2023.  Presented with nonhealing left heel ulcer as above.  Follow-up LEADs.  Consult vascular surgery.  Continue aspirin and statin.  Podiatry following.    Hypotension  Assessment & Plan  Continue midodrine with dialysis.    Mood disorder ([facility])  Assessment & Plan  Continue home Depakote.    Ischemic cardiomyopathy  Assessment & Plan  Most recent EF 40%.  Euvolemic on exam.  Continue beta-blocker.  Hold Entresto until vascular surgery evaluation as patient may require angiography.  Volume management via dialysis    CAD, multiple vessel  Assessment & Plan  Stable.  Continue aspirin, prasugrel, metoprolol and statin.    ESRD on dialysis ([facility])  Assessment & Plan  Lab Results   Component Value Date    EGFR 9 01/18/2024    EGFR 6 12/27/2023    EGFR 5 12/25/2023    CREATININE 5.76 (H) 01/18/2024    CREATININE 7.82 (H) 12/27/2023    CREATININE 8.83 (H) 12/25/2023     On hemodialysis, Monday Wednesday Friday.  Nephrology consulted, continue dialysis as scheduled.    Anemia  Assessment & Plan  In the setting of end-stage  "renal disease.  Hemoglobin stable.  BAYLEE per nephrology.    Mixed hyperlipidemia  Assessment & Plan  Continue home atorvastatin 40 mg daily.    Type 2 diabetes mellitus with chronic kidney disease on chronic dialysis, without long-term current use of insulin ([facility])  Assessment & Plan  Lab Results   Component Value Date    HGBA1C 5.1 01/10/2024       No results for input(s): \"POCGLU\" in the last 72 hours.    Blood Sugar Average: Last 72 hrs:    Diet controlled.  Sliding scale insulin with meals.           VTE Pharmacologic Prophylaxis: VTE Score: 4 Moderate Risk (Score 3-4) - Pharmacological DVT Prophylaxis Ordered: heparin.  Code Status: Level 1 - Full Code   Discussion with family: Updated  (wife) via phone.    Anticipated Length of Stay: Patient will be admitted on an inpatient basis with an anticipated length of stay of greater than 2 midnights secondary to as above.    Total Time Spent on Date of Encounter in care of patient:  mins. This time was spent on one or more of the following: performing physical exam; counseling and coordination of care; obtaining or reviewing history; documenting in the medical record; reviewing/ordering tests, medications or procedures; communicating with other healthcare professionals and discussing with patient's family/caregivers.    Chief Complaint: Nonhealing ulcer    History of Present Illness:  Asad Galloway is a 63 y.o. male with a PMH of end-stage renal disease on hemodialysis, CAD, ischemic cardiomyopathy, hypertension, hyperlipidemia CVA and chronic left heel ulcer.  Patient was sent from podiatry office secondary to nonhealing ulcer with concern for osteomyelitis.  Ulcer has been present for a while, recently worsened with some discharge.  Was able to walk with a walker.  Admits to pain.  Denies nausea, vomiting, fevers or chills.  Also denies chest pain, shortness of breath, palpitations or claudications.    Review of Systems:  Review of Systems "   Cardiovascular:  Negative for chest pain and palpitations.   Gastrointestinal:  Negative for abdominal pain and nausea.   Neurological:  Positive for light-headedness.       Past Medical and Surgical History:   Past Medical History:   Diagnosis Date    Cerebrovascular accident (CVA) due to thrombosis of left middle cerebral artery (HCC) 07/29/2018    Chronic kidney disease     Coronary artery disease     Diabetes mellitus (HCC)     not on meds    Dialysis patient (HCC)     M-W-F    Fistula     left upper arm for hemodialyis    GERD (gastroesophageal reflux disease)     History of coronary artery stent placement     x3    Hypercholesteremia     Hyperlipidemia     Hypertension     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    Limb alert care status     no BP/IV left arm    Neuropathy     Obesity     Osteomyelitis (HCC)     last assessed 11/4/16-per son not currently    PVC's (premature ventricular contractions)     sees [facility]    Stroke (HCC)     last weeof July 2018 [facility]    TIA (transient ischemic attack) 10/28/2018    Wears dentures     Wears glasses        Past Surgical History:   Procedure Laterality Date    ABDOMINAL SURGERY      CARDIAC CATHETERIZATION N/A 05/02/2022    Procedure: Cardiac Coronary Angiogram;  Surgeon: Sam Davis MD;  [facility];  Service: Cardiology    CARDIAC CATHETERIZATION N/A 05/02/2022    Procedure: Cardiac pci;  Surgeon: Sam Davis MD;  [facility];  Service: Cardiology    CARDIAC CATHETERIZATION  02/01/2023    Procedure: Cardiac catheterization;  Surgeon: Sam Davis MD;  [facility]: [facility]ARD[facility];  Service: Cardiology    CARDIAC CATHETERIZATION N/A 02/01/2023    Procedure: Cardiac pci;  Surgeon: Sam Davis MD;  [facility]: [facility];  Service: Cardiology    CARDIAC CATHETERIZATION N/A 02/01/2023    Procedure: Cardiac Coronary Angiogram;  Surgeon: Sam Davis MD;  [facility]: [facility];  Service:  "Cardiology    CARDIAC CATHETERIZATION N/A 02/01/2023    Procedure: Cardiac other-IVUS;  Surgeon: Sam Davis MD;  [facility]: [facility]ARD[facility];  Service: Cardiology    CHOLECYSTECTOMY      Percutaneous    COLONOSCOPY      CYSTOSCOPY      IR LOWER EXTREMITY ANGIOGRAM  9/29/2023    OTHER SURGICAL HISTORY      \"stimulator to control bowel movements\"    AL ESOPHAGOGASTRODUODENOSCOPY TRANSORAL DIAGNOSTIC N/A 09/27/2016    Procedure: ESOPHAGOGASTRODUODENOSCOPY (EGD);  Surgeon: Adele Rowe MD;  Location: [facility] GI LAB;  Service: Gastroenterology    AL LAPAROSCOPY SURG CHOLECYSTECTOMY N/A 02/29/2016    Procedure: LAPAROSCOPIC CHOLECYSTECTOMY ;  Surgeon: Cliff Roman DO;  Location: [facility] Main OR;  Service: General    AL SLCTV CATHJ 3RD+ ORD SLCTV ABDL PEL/LXTR BRNCH Left 9/29/2023    Procedure: Left leg angiogram with intervention;  Surgeon: Michelle Galvan MD;  Location: [facility] MA[facility];  Service: Vascular    ROTATOR CUFF REPAIR Right     SPINAL CORD STIMULATOR IMPLANT      \"Medtronic interstim model # 3058- in lower back to control bowel movements-currently turned off-battery is dead\"    TOE AMPUTATION Right 10/28/2016    Procedure: 3RD TOE AMPUTATION ;  Surgeon: Anjel Salas DPM;  Location: [facility];  Service:        Meds/Allergies:  Prior to Admission medications    Medication Sig Start Date End Date Taking? Authorizing Provider   aspirin (ECOTRIN LOW STRENGTH) 81 mg EC tablet Take 81 mg by mouth daily Resume on 8/14     Yes Historical Provider, MD   atorvastatin (LIPITOR) 40 mg tablet Take 1 tablet (40 mg total) by mouth daily with dinner 7/20/23  Yes Raj Auguste DO   Cholecalciferol (Vitamin D3) 1.25 MG (38782 UT) CAPS TAKE 1 CAPSULE BY MOUTH ONE TIME PER WEEK 12/14/23  Yes Mary Torres MD   divalproex sodium (DEPAKOTE SPRINKLE) 125 MG capsule TAKE 2 CAPSULES (250 MG TOTAL) BY MOUTH EVERY 12 (TWELVE) HOURS 6/20/23  Yes Raj Auguste DO   metoprolol succinate (TOPROL-XL) 50 mg 24 hr tablet Take 1 tablet " (50 mg total) by mouth 2 (two) times a day 3/17/23  Yes Britney Tan MD   midodrine (PROAMATINE) 2.5 mg tablet Take 1 tablet (2.5 mg total) by mouth as needed (for SBP<90 on dialysis days) 6/5/23  Yes Bismark Adler PA-C   prasugrel (EFFIENT) tablet Take 1 tablet (5 mg total) by mouth daily  Patient taking differently: Take 5 mg by mouth daily Takes with pudding 2/14/23  Yes BRITTANY Snyder   sacubitril-valsartan (Entresto) 24-26 MG TABS Take 1 tablet by mouth in the morning Bedtime 11/21/23  Yes Avelino Ernst MD   Sevelamer Carbonate 2.4 g PACK VIGOROUSLY MIX CONTENTS OF 1 PACKET IN WATER (IT WILL NOT DISSOLVE) AND DRINK 3 TIMES DAILY WITH MEALS 4/21/23  Yes Erma Silva MD   Blood Glucose Monitoring Suppl (True Metrix Meter) w/Device KIT Use to test blood sugars 3 times daily 11/24/23   Jaden Espino MD   Blood Pressure Monitoring (BLOOD PRESSURE CUFF) MISC Use to check blood pressure before taking blood pressure medication and 1 hour after and follow instructions provided in discharge instructions based on the readings. 8/13/18   Az Medina MD   fluticasone (FLONASE) 50 mcg/act nasal spray 1 spray into each nostril daily 7/9/23   BRITTANY Alicea   glucose blood (True Metrix Blood Glucose Test) test strip Use 1 each 3 (three) times a day Use as instructed 1/9/24   Dagoberto Rich PA-C   Lancets MISC Use 3 (three) times a day Use 3 times daily 1/9/24   Dagoberto Rich PA-C   sodium hypochlorite (DAKIN'S HALF-STRENGTH) external solution Apply 1 Application topically every other day At each dressing change 12/7/23   Anay Horton,      I have reviewed home medications with patient family member.    Allergies: No Known Allergies    Social History:  Marital Status: /Civil Union   Occupation:   Patient Pre-hospital Living Situation: Home  Patient Pre-hospital Level of Mobility: walks  Patient Pre-hospital Diet Restrictions:   Substance Use History:   Social History      Substance and Sexual Activity   Alcohol Use Never     Social History     Tobacco Use   Smoking Status Never   Smokeless Tobacco Never     Social History     Substance and Sexual Activity   Drug Use No       Family History:  Family History   Problem Relation Age of Onset    Leukemia Mother     Liver disease Mother     Lung cancer Mother         heavy smoker - 3 ppd    Heart disease Father     Liver disease Father     Diabetes type I Father     Multiple myeloma Sister     Breast cancer Sister     Urolithiasis Family     Alcohol abuse Neg Hx     Depression Neg Hx     Drug abuse Neg Hx     Substance Abuse Neg Hx     Mental illness Neg Hx        Physical Exam:     Vitals:   Blood Pressure: 104/54 (01/18/24 1422)  Pulse: 57 (01/18/24 1422)  Temperature: (!) 97.3 °F (36.3 °C) (01/18/24 1045)  Temp Source: Oral (01/18/24 1045)  Respirations: 18 (01/18/24 1422)  SpO2: 99 % (01/18/24 1422)    Physical Exam  Vitals and nursing note reviewed.   Constitutional:       General: He is not in acute distress.     Appearance: Normal appearance.   HENT:      Head: Normocephalic and atraumatic.   Cardiovascular:      Rate and Rhythm: Bradycardia present.      Heart sounds: Normal heart sounds. No murmur heard.  Pulmonary:      Breath sounds: No wheezing or rales.   Abdominal:      Palpations: Abdomen is soft.      Tenderness: There is no abdominal tenderness.   Skin:     General: Skin is warm.   Neurological:      Mental Status: He is alert.          Additional Data:     Lab Results:  Results from last 7 days   Lab Units 01/18/24  1455   WBC Thousand/uL 4.38   HEMOGLOBIN g/dL 10.1*   HEMATOCRIT % 33.7*   PLATELETS Thousands/uL 99*   NEUTROS PCT % 67   LYMPHS PCT % 20   MONOS PCT % 9   EOS PCT % 2     Results from last 7 days   Lab Units 01/18/24  1455   SODIUM mmol/L 128*   POTASSIUM mmol/L 4.4   CHLORIDE mmol/L 91*   CO2 mmol/L 27   BUN mg/dL 46*   CREATININE mg/dL 5.76*   ANION GAP mmol/L 10   CALCIUM mg/dL 8.1*   ALBUMIN g/dL 4.0    TOTAL BILIRUBIN mg/dL 0.51   ALK PHOS U/L 94   ALT U/L 7   AST U/L 10*   GLUCOSE RANDOM mg/dL 183*                       Lines/Drains:  Invasive Devices       Peripheral Intravenous Line  Duration             Peripheral IV 01/18/24 Proximal;Right;Ventral (anterior) Forearm <1 day              Line  Duration             Hemodialysis AV Fistula Left Upper arm -- days                        Imaging: Reviewed radiology reports from this admission including: xray(s)  XR heel / calcaneus 2+ vw left    (Results Pending)   XR foot 3+ vw left    (Results Pending)   VAS ARTERIAL DUPLEX- LOWER LIMB BILATERAL    (Results Pending)       EKG and Other Studies Reviewed on Admission:   EKG: NSR. HR 63.    ** Please Note: This note has been constructed using a voice recognition system. **

## 2024-01-18 NOTE — CONSULTS
Podiatry - Consultation    Patient Information:   Asad Galloway 63 y.o. male MRN: [id number]  Unit/Bed#: ED 06 Encounter: [id number]  PCP: Raj Auguste DO  Date of Admission:  1/18/2024  Date of Consultation: 01/18/24  Requesting Physician: Speedy Quintero MD      ASSESSMENT:    Asad Galloway is a 63 y.o. male with:    Nonhealing left heel ulceration  T2DM  ESRD  PAD  Diabetic polyneuropathy  History of toe amputations  Medical noncompliance  Acute on chronic CHF    PLAN:    Patient was seen and evaluated in the ED. Patient has a chronic nonhealing ulceration to the left heel, concerning for clinical osteomyelitis given positive probe to bone.  Clinically wound has mild malodor, no purulence or underlying fluctuance.  Plan for local wound care at this time. Patient may require bedside vs. OR debridement of left heel pending further imaging and potential vascular workup/intervention. No concrete OR plans at this time.  Start IV antibiotics, recommend Cefepime. Patient likely requires nephrology consult for antibiotic dosing with ESRD on dialysis  Follow up left heel radiographs  Follow up LEADs. Suspect patient may need vascular surgery consult  Follow up wound cultures  Follow up labs. Patient is afebrile at time of admission  Local wound care consisting of Dakins, DSD to left heel. Wound care instructions placed.  Recommend strict offloading using Prevalon boots when non-ambulatory  Additional elevation on green foam wedges or pillows when non-ambulatory  Rest of care per primary team.  Will discuss this plan with my attending and update as needed.    Weightbearing status: Weightbearing as tolerated to left foot in a surgical shoe. When non-weightbearing he is to use a prevalon boot     SUBJECTIVE:    History of Present Illness:    Asad Galloway is a 63 y.o. male who is originally admitted 1/18/2024 due to concern of left heel nonhealing wound. Patient has an extensive past medical history of T2DM, HLD, GERD,  history of stroke, ESRD, HTN, history of NSTEMI, ischemic cardiomyopathy, presence of external cardiac defibrillator, congestive heart failure.    We are consulted for a left heel ulceration.  The patient was referred to the ED by Dr. Church from wound care today for nonhealing left heel ulcer with concern for potential bone infection given depth of wound.  Patient states that the wound has been there for quite some time, he was recently using a wound VAC.  He does admit that he has been putting weight and pressure on the heel when he was advised to offload it.  He states that he removed the wound VAC dressing twice because he did not like the sensation of the wound VAC. He states that he is concerned that the wound is deep now and that there might be an infection in his heel bone    He denies nausea, vomiting, shortness of breath, chest pain, fevers, chills    Review of Systems:    Constitutional: Negative.    HENT: Negative.    Eyes: Negative.    Respiratory: Negative.    Cardiovascular: Negative.    Gastrointestinal: Negative.    Musculoskeletal: left heel pain   Skin: left heel ulcer   Neurological: numbness in feet bilaterally   Psych: Negative.     Past Medical and Surgical History:     Past Medical History:   Diagnosis Date    Cerebrovascular accident (CVA) due to thrombosis of left middle cerebral artery (HCC) 07/29/2018    Chronic kidney disease     Coronary artery disease     Diabetes mellitus (HCC)     not on meds    Dialysis patient (HCC)     M-W-F    Fistula     left upper arm for hemodialyis    GERD (gastroesophageal reflux disease)     History of coronary artery stent placement     x3    Hypercholesteremia     Hyperlipidemia     Hypertension     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    Limb alert care status     no BP/IV left arm    Neuropathy     Obesity     Osteomyelitis (HCC)     last assessed 11/4/16-per son not currently    PVC's (premature ventricular contractions)     sees  Cardio    Stroke  "([facility])     last weeof July 2018 [facility]    TIA (transient ischemic attack) 10/28/2018    Wears dentures     Wears glasses        Past Surgical History:   Procedure Laterality Date    ABDOMINAL SURGERY      CARDIAC CATHETERIZATION N/A 05/02/2022    Procedure: Cardiac Coronary Angiogram;  Surgeon: Sam Davis MD;  [facility];  Service: Cardiology    CARDIAC CATHETERIZATION N/A 05/02/2022    Procedure: Cardiac pci;  Surgeon: Sam Davis MD;  [facility];  Service: Cardiology    CARDIAC CATHETERIZATION  02/01/2023    Procedure: Cardiac catheterization;  Surgeon: Sam Davis MD;  [facility];  Service: Cardiology    CARDIAC CATHETERIZATION N/A 02/01/2023    Procedure: Cardiac pci;  Surgeon: Sam Davis MD;  [facility];  Service: Cardiology    CARDIAC CATHETERIZATION N/A 02/01/2023    Procedure: Cardiac Coronary Angiogram;  Surgeon: Sam Davis MD;  [facility];  Service: Cardiology    CARDIAC CATHETERIZATION N/A 02/01/2023    Procedure: Cardiac other-IVUS;  Surgeon: Sam Davis MD;  [facility];  Service: Cardiology    CHOLECYSTECTOMY      Percutaneous    COLONOSCOPY      CYSTOSCOPY      IR LOWER EXTREMITY ANGIOGRAM  9/29/2023    OTHER SURGICAL HISTORY      \"stimulator to control bowel movements\"    ND ESOPHAGOGASTRODUODENOSCOPY TRANSORAL DIAGNOSTIC N/A 09/27/2016    Procedure: ESOPHAGOGASTRODUODENOSCOPY (EGD);  Surgeon: Adele Rowe MD;  Location: [facility] GI [facility];  Service: Gastroenterology    ND LAPAROSCOPY SURG CHOLECYSTECTOMY N/A 02/29/2016    Procedure: LAPAROSCOPIC CHOLECYSTECTOMY ;  Surgeon: Cliff Roman DO;  Location: [facility];  Service: General    ND SLCTV CATHJ 3RD+ ORD SLCTV ABDL PEL/LXTR BRNCH Left 9/29/2023    Procedure: Left leg angiogram with intervention;  Surgeon: Michelle Galvan MD;  Location: [facility];  Service: Vascular    ROTATOR CUFF REPAIR Right     SPINAL " "CORD STIMULATOR IMPLANT      \"Medtronic interstim model # 3058- in lower back to control bowel movements-currently turned off-battery is dead\"    TOE AMPUTATION Right 10/28/2016    Procedure: 3RD TOE AMPUTATION ;  Surgeon: Anjel Salas DPM;  Location: [facility];  Service:        Meds/Allergies:    (Not in a hospital admission)      No Known Allergies    Social History:     Marital Status: /Civil Union    Substance Use History:   Social History     Substance and Sexual Activity   Alcohol Use Never     Social History     Tobacco Use   Smoking Status Never   Smokeless Tobacco Never     Social History     Substance and Sexual Activity   Drug Use No       Family History:    Family History   Problem Relation Age of Onset    Leukemia Mother     Liver disease Mother     Lung cancer Mother         heavy smoker - 3 ppd    Heart disease Father     Liver disease Father     Diabetes type I Father     Multiple myeloma Sister     Breast cancer Sister     Urolithiasis Family     Alcohol abuse Neg Hx     Depression Neg Hx     Drug abuse Neg Hx     Substance Abuse Neg Hx     Mental illness Neg Hx          OBJECTIVE:    Vitals:   Blood Pressure: 104/54 (01/18/24 1422)  Pulse: 57 (01/18/24 1422)  Temperature: (!) 97.3 °F (36.3 °C) (01/18/24 1045)  Temp Source: Oral (01/18/24 1045)  Respirations: 18 (01/18/24 1422)  SpO2: 99 % (01/18/24 1422)    Physical Exam:    General Appearance: Alert, cooperative, no distress.  HEENT: Head normocephalic, atraumatic, without obvious abnormality.  Heart: Normal rate and rhythm.  Lungs: Non-labored breathing. No respiratory distress.  Abdomen: Without distension.  Psychiatric: AAOx3  Lower Extremity:  Vascular:   Right DP pulse is weak, PT pulse is nonpalpable. Left DP pulse is weak, PT pulses is nonpalpable. CRT < 3 seconds at the digits. +2/4 edema noted at bilateral lower extremities. Pedal hair is absent. Skin temperature is WNL bilaterally.    Musculoskeletal:  MMT is 4/5 in all " "muscle compartments bilaterally. ROM at the 1st MPJ and ankle joint are limited bilaterally with the leg extended. Mild pain on palpation of left heel. History of right lesser digit amputation noted     Dermatological:  Lower extremity wound(s) as noted below:    Wound #: 1  Location: posterior medial left heel  Length 2cm: Width 1cm: Depth 0.8cm:   Deepest Tissue Noted in Base: bone  Probe to Bone: Yes  Peripheral Skin Description: Attached  Granulation: 10% Fibrotic Tissue: 80% Necrotic Tissue: 10%   Drainage Amount: minimal, serous  Signs of Infection: Yes  - malodor, positive probe to bone    Neurological:  Gross sensation is diminished. Protective sensation is diminished. Patient Reports numbness and/or paresthesias.    Clinical Images 01/18/24:      Additional data:     Lab Results: I have personally reviewed pertinent labs including:    Results from last 7 days   Lab Units 01/18/24  1455   WBC Thousand/uL 4.38   HEMOGLOBIN g/dL 10.1*   HEMATOCRIT % 33.7*   PLATELETS Thousands/uL 99*   NEUTROS PCT % 67   LYMPHS PCT % 20   MONOS PCT % 9   EOS PCT % 2     Results from last 7 days   Lab Units 01/18/24  1455   POTASSIUM mmol/L 4.4   CHLORIDE mmol/L 91*   CO2 mmol/L 27   BUN mg/dL 46*   CREATININE mg/dL 5.76*   CALCIUM mg/dL 8.1*   ALK PHOS U/L 94   ALT U/L 7   AST U/L 10*           Cultures: I have personally reviewed pertinent cultures including:  Pending            Imaging: I have personally reviewed pertinent reports in PACS.  EKG, Pathology, and Other Studies: I have personally reviewed pertinent reports.        ** Please Note: Portions of the record may have been created with voice recognition software. Occasional wrong word or \"sound a like\" substitutions may have occurred due to the inherent limitations of voice recognition software. Read the chart carefully and recognize, using context, where substitutions have occurred. **    "

## 2024-01-18 NOTE — ASSESSMENT & PLAN NOTE
"Lab Results   Component Value Date    HGBA1C 5.1 01/10/2024       No results for input(s): \"POCGLU\" in the last 72 hours.    Blood Sugar Average: Last 72 hrs:    Diet controlled.  Sliding scale insulin with meals.  "

## 2024-01-18 NOTE — ASSESSMENT & PLAN NOTE
Lab Results   Component Value Date    EGFR 9 01/18/2024    EGFR 6 12/27/2023    EGFR 5 12/25/2023    CREATININE 5.76 (H) 01/18/2024    CREATININE 7.82 (H) 12/27/2023    CREATININE 8.83 (H) 12/25/2023     On hemodialysis, Monday Wednesday Friday.  Nephrology consulted, continue dialysis as scheduled.

## 2024-01-18 NOTE — ED PROCEDURE NOTE
Procedure  US Guided Peripheral IV    Date/Time: 1/18/2024 2:31 PM    Performed by: Jamin Nicole MD  Authorized by: Jamin Nicole MD    Patient location:  ED  Performed by:  Resident  Indications:     Indications: difficulty obtaining IV access      Image availability:  Not saved  Procedure details:     Patient evaluated for contraindications to access (i.e. fistula, thrombosis, etc): Yes      Standard clean technique used for ultrasound access: Yes      Location:  Right arm    Catheter size:  20 gauge    Number of attempts:  2    Successful placement: yes    Post-procedure details:     Post-procedure:  Dressing applied    Assessment: free fluid flow and no signs of infiltration      Post-procedure complications: none      Patient tolerance of procedure:  Tolerated well, no immediate complications  Comments:      Vein blew during blood draw on first IV placed                   Jamin Nicole MD  01/18/24 [id number]

## 2024-01-18 NOTE — CONSULTS
Consultation - Vascular Surgery   Asad Galloway 63 y.o. male MRN: [id number]  Unit/Bed#: ED 06 Encounter: [id number]    Assessment/Plan     Assessment:  63M PMHx of ESRD,PAD, CVA, DM, and CLTI who present with non-healing heel ulcer s/p LLE agram w/ balloon angioplasty of PT and AT on 9/29/2023    Plan:  -No acute surgical intervention  -Follow up LEADs  -Continue ASA/Statin  -Pressure offloading of LLE wound  -Appreciate podiatry recommendations    History of Present Illness   HPI:  Asad Galloway is a 63 y.o. male PMHx DM, ESRD, HLD, CVA, and CHF who presents with a non healing LLE heal wound. The patient was referred to the ED from wound care today 2/2 his nonhealing heel ulcer w/ concern for bone infection. Patient reports wound has been present for a while, but has worsened over the past month. Reports he ambulates at home with a walker without significant claudication symptoms. He denies any pain of the area. No fevers/ or chills. Denies any nausea or vomiting. No rest pain.    Inpatient consult to Vascular Surgery  Consult performed by: Asad Lin DO  Consult ordered by: Speedy Quintero MD          Review of Systems   Constitutional:  Negative for chills and fever.   HENT: Negative.     Eyes: Negative.    Respiratory: Negative.     Cardiovascular: Negative.    Gastrointestinal: Negative.    Endocrine: Negative.    Genitourinary: Negative.    Musculoskeletal: Negative.    Skin:  Positive for wound.   Neurological:  Positive for numbness.   Hematological: Negative.    Psychiatric/Behavioral: Negative.         Historical Information   Past Medical History:   Diagnosis Date    Cerebrovascular accident (CVA) due to thrombosis of left middle cerebral artery (HCC) 07/29/2018    Chronic kidney disease     Coronary artery disease     Diabetes mellitus (HCC)     not on meds    Dialysis patient (HCC)     M-W-F    Fistula     left upper arm for hemodialyis    GERD (gastroesophageal reflux disease)     History of  "coronary artery stent placement     x3    Hypercholesteremia     Hyperlipidemia     Hypertension     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    Limb alert care status     no BP/IV left arm    Neuropathy     Obesity     Osteomyelitis (HCC)     last assessed 11/4/16-per son not currently    PVC's (premature ventricular contractions)     sees  Cardio    Stroke (HCC)     last weeof July 2018 [facility]    TIA (transient ischemic attack) 10/28/2018    Wears dentures     Wears glasses      Past Surgical History:   Procedure Laterality Date    ABDOMINAL SURGERY      CARDIAC CATHETERIZATION N/A 05/02/2022    Procedure: Cardiac Coronary Angiogram;  Surgeon: Sam Davis MD;  [facility];  Service: Cardiology    CARDIAC CATHETERIZATION N/A 05/02/2022    Procedure: Cardiac pci;  Surgeon: Sam Davis MD;  [facility];  Service: Cardiology    CARDIAC CATHETERIZATION  02/01/2023    Procedure: Cardiac catheterization;  Surgeon: Sam Davis MD;  [facility]: [facility];  Service: Cardiology    CARDIAC CATHETERIZATION N/A 02/01/2023    Procedure: Cardiac pci;  Surgeon: Sam Davis MD;  [facility]: [facility];  Service: Cardiology    CARDIAC CATHETERIZATION N/A 02/01/2023    Procedure: Cardiac Coronary Angiogram;  Surgeon: Sam Davis MD;  [facility]: [facility];  Service: Cardiology    CARDIAC CATHETERIZATION N/A 02/01/2023    Procedure: Cardiac other-IVUS;  Surgeon: Sam Davis MD;  Location: [facility];  Service: Cardiology    CHOLECYSTECTOMY      Percutaneous    COLONOSCOPY      CYSTOSCOPY      IR LOWER EXTREMITY ANGIOGRAM  9/29/2023    OTHER SURGICAL HISTORY      \"stimulator to control bowel movements\"    SC ESOPHAGOGASTRODUODENOSCOPY TRANSORAL DIAGNOSTIC N/A 09/27/2016    Procedure: ESOPHAGOGASTRODUODENOSCOPY (EGD);  Surgeon: Adele Rowe MD;  Location: [facility] GI [facility];  Service: Gastroenterology    SC LAPAROSCOPY SURG CHOLECYSTECTOMY N/A " "02/29/2016    Procedure: LAPAROSCOPIC CHOLECYSTECTOMY ;  Surgeon: Cliff Roman DO;  Location: [facility];  Service: General    IL SLCTV CATHJ 3RD+ ORD SLCTV ABDL PEL/LXTR BRNCH Left 9/29/2023    Procedure: Left leg angiogram with intervention;  Surgeon: Michelle Galvan MD;  Location: [facility];  Service: Vascular    ROTATOR CUFF REPAIR Right     SPINAL CORD STIMULATOR IMPLANT      \"Medtronic interstim model # 3058- in lower back to control bowel movements-currently turned off-battery is dead\"    TOE AMPUTATION Right 10/28/2016    Procedure: 3RD TOE AMPUTATION ;  Surgeon: Anjel Salas DPM;  Location: [facility];  Service:      Social History   Social History     Substance and Sexual Activity   Alcohol Use Never     Social History     Substance and Sexual Activity   Drug Use No     E-Cigarette/Vaping    E-Cigarette Use Never User      E-Cigarette/Vaping Substances    Nicotine No     THC No     CBD No     Flavoring No     Other No     Unknown No      Social History     Tobacco Use   Smoking Status Never   Smokeless Tobacco Never     Family History:   Family History   Problem Relation Age of Onset    Leukemia Mother     Liver disease Mother     Lung cancer Mother         heavy smoker - 3 ppd    Heart disease Father     Liver disease Father     Diabetes type I Father     Multiple myeloma Sister     Breast cancer Sister     Urolithiasis Family     Alcohol abuse Neg Hx     Depression Neg Hx     Drug abuse Neg Hx     Substance Abuse Neg Hx     Mental illness Neg Hx        Meds/Allergies   all current active meds have been reviewed  No Known Allergies    Objective   First Vitals:   Blood Pressure: (!) 102/42 (01/18/24 1046)  Pulse: 58 (01/18/24 1045)  Temperature: (!) 97.3 °F (36.3 °C) (01/18/24 1045)  Temp Source: Oral (01/18/24 1045)  Respirations: 18 (01/18/24 1045)  SpO2: 99 % (01/18/24 1045)    Current Vitals:   Blood Pressure: 104/54 (01/18/24 1422)  Pulse: 57 (01/18/24 1422)  Temperature: (!) 97.3 °F " "(36.3 °C) (01/18/24 1045)  Temp Source: Oral (01/18/24 1045)  Respirations: 18 (01/18/24 1422)  SpO2: 99 % (01/18/24 1422)    No intake or output data in the 24 hours ending 01/18/24 1550    Invasive Devices       Peripheral Intravenous Line  Duration             Peripheral IV 01/18/24 Proximal;Right;Ventral (anterior) Forearm <1 day              Line  Duration             Hemodialysis AV Fistula Left Upper arm -- days                    Physical Exam  General: NAD  HENT: NCAT MMM  Neck: supple, no JVD  CV: nl rate  Lungs: nl wob. No resp distress  ABD: Soft, nontender, nondistended  Extrem: LLE with heel wound. No surrounding erythema; however, foul smelling. Palpable fem/pop pulses b/l. Palpable DP b/l. Dop PT b/l.  Neuro: AAOx3    Lab Results: I have personally reviewed pertinent lab results.  , CBC:   Lab Results   Component Value Date    WBC 4.38 01/18/2024    HGB 10.1 (L) 01/18/2024    HCT 33.7 (L) 01/18/2024     (H) 01/18/2024    PLT 99 (L) 01/18/2024    RBC 3.28 (L) 01/18/2024    MCH 30.8 01/18/2024    MCHC 30.0 (L) 01/18/2024    RDW 15.2 (H) 01/18/2024    MPV 12.1 01/18/2024    NRBC 0 01/18/2024   , CMP:   Lab Results   Component Value Date    SODIUM 128 (L) 01/18/2024    K 4.4 01/18/2024    CL 91 (L) 01/18/2024    CO2 27 01/18/2024    BUN 46 (H) 01/18/2024    CREATININE 5.76 (H) 01/18/2024    CALCIUM 8.1 (L) 01/18/2024    AST 10 (L) 01/18/2024    ALT 7 01/18/2024    ALKPHOS 94 01/18/2024    EGFR 9 01/18/2024   , Coagulation: No results found for: \"PT\", \"INR\", \"APTT\"  Imaging: I have personally reviewed pertinent films in PACS  EKG, Pathology, and Other Studies: I have personally reviewed pertinent films in PACS        "

## 2024-01-18 NOTE — CONSULTS
Consultation - Nephrology   Asad Galloway 63 y.o. male MRN: [id number]  Unit/Bed#: ED 06 Encounter: [id number]    ASSESSMENT and PLAN:  [place] [facility] [place] heel wound, further management as per wound care/podiatry  Anemia of CKD, follow H/H, BAYLEE as per his op orders  MVCAD, prior PCI, Ischemic cardiomyopathy EF 35%, continue UF during HD to bring him at his op DW  Hemodynamic, BP labile  CKD, mineral bone disorders, continue renal diet, and binders with meal   Access - left UF AVF in place    SUMMARY OF RECOMMENDATIONS:  Plan for HD tomorrow following op schedule  Further plan for heel wound as per podiatry/wound care, renally adjust antibiotics  Discussed with IM attending, agrees with plan      HISTORY OF PRESENT ILLNESS:  Requesting Physician: Speedy Quintero MD  Reason for Consult: ESRD on HD    Asad Galloway is a 63 y.o. male who was admitted to [facility]  after presenting with non healing left heel ulceration. A renal consultation is requested today for assistance in the management of ESRD on HD.  Patient with h/o ESRD, ICM, HTN, anemia, diabetes, chronic left heel wound that was send from wound care office to the hospital due to nonhealing ulceration.  Nephrology was consulted for continuation of dialysis while in-house.  Patient currently denies complaints, no CP, SOB, no N/V/D/C, no recent fevers or chills.  Reports last HD Rx yesterday .    PAST MEDICAL HISTORY:  Past Medical History:   Diagnosis Date    Cerebrovascular accident (CVA) due to thrombosis of left middle cerebral artery (HCC) 07/29/2018    Chronic kidney disease     Coronary artery disease     Diabetes mellitus (HCC)     not on meds    Dialysis patient (HCC)     M-W-F    Fistula     left upper arm for hemodialyis    GERD (gastroesophageal reflux disease)     History of coronary artery stent placement     x3    Hypercholesteremia     Hyperlipidemia     Hypertension     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    Limb  "alert care status     no BP/IV left arm    Neuropathy     Obesity     Osteomyelitis (HCC)     last assessed 11/4/16-per son not currently    PVC's (premature ventricular contractions)     sees  Cardio    Stroke ([facility])     last weeof July 2018 [facility]    TIA (transient ischemic attack) 10/28/2018    Wears dentures     Wears glasses        PAST SURGICAL HISTORY:  Past Surgical History:   Procedure Laterality Date    ABDOMINAL SURGERY      CARDIAC CATHETERIZATION N/A 05/02/2022    Procedure: Cardiac Coronary Angiogram;  Surgeon: Sam Davis MD;  [facility];  Service: Cardiology    CARDIAC CATHETERIZATION N/A 05/02/2022    Procedure: Cardiac pci;  Surgeon: Sam Davis MD;  [facility];  Service: Cardiology    CARDIAC CATHETERIZATION  02/01/2023    Procedure: Cardiac catheterization;  Surgeon: Sam Davis MD;  [facility];  Service: Cardiology    CARDIAC CATHETERIZATION N/A 02/01/2023    Procedure: Cardiac pci;  Surgeon: Sam Davis MD;  [facility];  Service: Cardiology    CARDIAC CATHETERIZATION N/A 02/01/2023    Procedure: Cardiac Coronary Angiogram;  Surgeon: Sam Davis MD;  [facility];  Service: Cardiology    CARDIAC CATHETERIZATION N/A 02/01/2023    Procedure: Cardiac other-IVUS;  Surgeon: Sam Davis MD;  [facility];  Service: Cardiology    CHOLECYSTECTOMY      Percutaneous    COLONOSCOPY      CYSTOSCOPY      IR LOWER EXTREMITY ANGIOGRAM  9/29/2023    OTHER SURGICAL HISTORY      \"stimulator to control bowel movements\"    MI ESOPHAGOGASTRODUODENOSCOPY TRANSORAL DIAGNOSTIC N/A 09/27/2016    Procedure: ESOPHAGOGASTRODUODENOSCOPY (EGD);  Surgeon: Adele Rowe MD;  Location: [facility];  Service: Gastroenterology    MI LAPAROSCOPY SURG CHOLECYSTECTOMY N/A 02/29/2016    Procedure: LAPAROSCOPIC CHOLECYSTECTOMY ;  Surgeon: Cliff Roman DO;  Location: [facility];  Service: General    " "NH SLCTV CATHJ 3RD+ ORD SLCTV ABDL PEL/LXTR BRNCH Left 9/29/2023    Procedure: Left leg angiogram with intervention;  Surgeon: Michelle Galvan MD;  [facility];  Service: Vascular    ROTATOR CUFF REPAIR Right     SPINAL CORD STIMULATOR IMPLANT      \"Medtronic interstim model # 3058- in lower back to control bowel movements-currently turned off-battery is dead\"    TOE AMPUTATION Right 10/28/2016    Procedure: 3RD TOE AMPUTATION ;  Surgeon: Anjel Salas DPM;  Location[facility];  Service:        SOCIAL HISTORY:  Social History     Substance and Sexual Activity   Alcohol Use Never     Social History     Substance and Sexual Activity   Drug Use No     Social History     Tobacco Use   Smoking Status Never   Smokeless Tobacco Never       FAMILY HISTORY:  Family History   Problem Relation Age of Onset    Leukemia Mother     Liver disease Mother     Lung cancer Mother         heavy smoker - 3 ppd    Heart disease Father     Liver disease Father     Diabetes type I Father     Multiple myeloma Sister     Breast cancer Sister     Urolithiasis Family     Alcohol abuse Neg Hx     Depression Neg Hx     Drug abuse Neg Hx     Substance Abuse Neg Hx     Mental illness Neg Hx        ALLERGIES:  No Known Allergies    MEDICATIONS:    Current Facility-Administered Medications:     acetaminophen (TYLENOL) tablet 650 mg, 650 mg, Oral, Q8H Speedy EPPS MD    aspirin (ECOTRIN LOW STRENGTH) EC tablet 81 mg, 81 mg, Oral, Daily, Speedy Quintero MD    atorvastatin (LIPITOR) tablet 40 mg, 40 mg, Oral, Daily With Dinner, Speedy Quintero MD    cefepime (MAXIPIME) 1,000 mg in dextrose 5 % 50 mL IVPB, 1,000 mg, Intravenous, Q24H, Speedy Quintero MD    divalproex sodium (DEPAKOTE SPRINKLE) capsule 250 mg, 250 mg, Oral, Q12H Speedy EPPS MD    heparin (porcine) subcutaneous injection 5,000 Units, 5,000 Units, Subcutaneous, Q8H Speedy EPPS MD    metoprolol succinate (TOPROL-XL) 24 hr tablet 50 mg, 50 mg, Oral, BID, Speedy Quintero" MD    midodrine (PROAMATINE) tablet 2.5 mg, 2.5 mg, Oral, Before Dialysis, Speedy Quintero MD    ondansetron (ZOFRAN) injection 4 mg, 4 mg, Intravenous, Q6H PRN, Speedy Quintero MD    prasugrel (EFFIENT) tablet 5 mg, 5 mg, Oral, Daily, Speedy Quintero MD    Current Outpatient Medications:     aspirin (ECOTRIN LOW STRENGTH) 81 mg EC tablet, Take 81 mg by mouth daily Resume on 8/14  , Disp: , Rfl:     atorvastatin (LIPITOR) 40 mg tablet, Take 1 tablet (40 mg total) by mouth daily with dinner, Disp: 90 tablet, Rfl: 1    Blood Glucose Monitoring Suppl (True Metrix Meter) w/Device KIT, Use to test blood sugars 3 times daily, Disp: 1 kit, Rfl: 0    Blood Pressure Monitoring (BLOOD PRESSURE CUFF) MISC, Use to check blood pressure before taking blood pressure medication and 1 hour after and follow instructions provided in discharge instructions based on the readings., Disp: 1 each, Rfl: 0    Cholecalciferol (Vitamin D3) 1.25 MG (16474 UT) CAPS, TAKE 1 CAPSULE BY MOUTH ONE TIME PER WEEK, Disp: 12 capsule, Rfl: 3    divalproex sodium (DEPAKOTE SPRINKLE) 125 MG capsule, TAKE 2 CAPSULES (250 MG TOTAL) BY MOUTH EVERY 12 (TWELVE) HOURS, Disp: 360 capsule, Rfl: 1    fluticasone (FLONASE) 50 mcg/act nasal spray, 1 spray into each nostril daily, Disp: 9.9 mL, Rfl: 0    glucose blood (True Metrix Blood Glucose Test) test strip, Use 1 each 3 (three) times a day Use as instructed, Disp: 300 strip, Rfl: 1    Lancets MISC, Use 3 (three) times a day Use 3 times daily, Disp: 300 each, Rfl: 0    metoprolol succinate (TOPROL-XL) 50 mg 24 hr tablet, Take 1 tablet (50 mg total) by mouth 2 (two) times a day, Disp: 180 tablet, Rfl: 3    midodrine (PROAMATINE) 2.5 mg tablet, Take 1 tablet (2.5 mg total) by mouth as needed (for SBP<90 on dialysis days), Disp: 30 tablet, Rfl: 0    prasugrel (EFFIENT) tablet, Take 1 tablet (5 mg total) by mouth daily (Patient taking differently: Take 5 mg by mouth daily Takes with pudding), Disp: 90 tablet, Rfl: 3     "sacubitril-valsartan (Entresto) 24-26 MG TABS, Take 1 tablet by mouth in the morning Bedtime, Disp: 90 tablet, Rfl: 3    Sevelamer Carbonate 2.4 g PACK, VIGOROUSLY MIX CONTENTS OF 1 PACKET IN WATER (IT WILL NOT DISSOLVE) AND DRINK 3 TIMES DAILY WITH MEALS, Disp: , Rfl:     sodium hypochlorite (DAKIN'S HALF-STRENGTH) external solution, Apply 1 Application topically every other day At each dressing change, Disp: 473 mL, Rfl: 0    REVIEW OF SYSTEMS:  All the systems were reviewed and were negative except as documented on the HPI.    PHYSICAL EXAM:  Current Weight:    First Weight:    Vitals:    01/18/24 1045 01/18/24 1046 01/18/24 1422   BP:  (!) 102/42 104/54   Pulse: 58  57   Resp: 18  18   Temp: (!) 97.3 °F (36.3 °C)     TempSrc: Oral     SpO2: 99%  99%     No intake or output data in the 24 hours ending 01/18/24 1458    General: conscious, cooperative, in not acute distress  Eyes: conjunctivae pink, anicteric sclerae  ENT: lips and mucous membranes moist  Neck: supple, no JVD  Chest: clear breath sounds bilateral, no crackles, ronchus or wheezings  CVS: distinct S1 & S2, normal rate, regular rhythm  Abdomen: soft, non-tender, non-distended, normoactive bowel sounds  Back: no CVA tenderness  Extremities: no edema of both legs, left UE AVF with + thrill and bruit, left foot covered with dressing   Skin: no rash  Neuro: awake, alert, oriented      Invasive Devices:        Lab Results: pending      Portions of the record may have been created with voice recognition software. Occasional wrong word or \"sound a like\" substitutions may have occurred due to the inherent limitations of voice recognition software. Read the chart carefully and recognize, using context, where substitutions have occurred.If you have any questions, please contact the dictating provider.  "

## 2024-01-18 NOTE — ASSESSMENT & PLAN NOTE
Most recent EF 40%.  Euvolemic on exam.  Continue beta-blocker.  Hold Entresto until vascular surgery evaluation as patient may require angiography.  Volume management via dialysis

## 2024-01-18 NOTE — ASSESSMENT & PLAN NOTE
Chronic nonhealing ulcer of the left heel, positive probe to bone concerning for clinical osteomyelitis.  Patient afebrile, no leukocytosis and hemodynamically stable.  Follows with podiatry, was sent from the office for direct admission.  Continue local wound care lo podiatry.  May require debridement in the OR.  Foot x-rays pending.  LEADs pending.  Vascular surgery consult.  Start renally adjusted dose cefepime.  Follow wound cultures.  Consider ID consult pending clinical course.

## 2024-01-18 NOTE — PROGRESS NOTES
Patient ID: Asad Galloway is a 63 y.o. male Date of Birth 1960     Chief Complaint  Chief Complaint   Patient presents with   • Follow Up Wound Care Visit     Left heel wound       Allergies  Patient has no known allergies.    Assessment:    No problem-specific Assessment & Plan notes found for this encounter.       Diagnoses and all orders for this visit:    Ulcer of heel, left, with necrosis of muscle (HCC)  -     Wound cleansing and dressings Diabetic Ulcer Left Heel; Future  -     lidocaine (LMX) 4 % cream  -     Transfer to other facility    Type 2 diabetes mellitus with chronic kidney disease on chronic dialysis, without long-term current use of insulin (HCC)  -     Transfer to other facility    Peripheral arterial disease (HCC)  -     Transfer to other facility            Procedures        Plan:  No significant improvement with out-patient treatment.   His condition is complicated with diabetes, PAD, HD, and non-compliance.  Will admit him to [facility] for further treatment and evaluation.  See admit H&P for more details.        Wound 06/02/23 Diabetic Ulcer Heel Left (Active)   Enter Oliveros score: Oliveros Grade 2: Deep ulcer extended to ligament, tendon, joint capsule, bone, or deep fascia without abscess or osteomyelitis (OM) 01/18/24 0941   Wound Image Images linked 01/18/24 0941   Wound Description Slough;Yellow;Pink 01/18/24 0941   Lani-wound Assessment Intact;Fragile;Maceration 01/18/24 0941   Wound Length (cm) 1 cm 01/18/24 0941   Wound Width (cm) 2 cm 01/18/24 0941   Wound Depth (cm) 0.8 cm 01/18/24 0941   Wound Surface Area (cm^2) 2 cm^2 01/18/24 0941   Wound Volume (cm^3) 1.6 cm^3 01/18/24 0941   Calculated Wound Volume (cm^3) 1.6 cm^3 01/18/24 0941   Undermining 1 0.7 01/18/24 0941   Undermining 1 is depth extending from 1-3 o'clock 01/18/24 0941   Drainage Amount Moderate 01/18/24 0941   Drainage Description Tan;Yellow;Foul smelling 01/18/24 0941   Non-staged Wound Description Full thickness  01/18/24 0941       Wound 06/02/23 Diabetic Ulcer Heel Left (Active)   Date First Assessed/Time First Assessed: 06/02/23 1908   Primary Wound Type: Diabetic Ulcer  Location: Heel  Wound Location Orientation: Left  Dressing Status: Clean;Dry;Intact       [REMOVED] Wound 01/29/23 Abrasion(s) Pretibial Right (Removed)   Resolved Date/Resolved Time: 12/21/23 0834  Date First Assessed/Time First Assessed: 01/29/23 1218   Traumatic Wound Type: Abrasion(s)  Location: Pretibial  Wound Location Orientation: Right  Wound Description (Comments): Scabbed  Wound Outcome: Other...       [REMOVED] Wound 02/01/23 Wrist Anterior;Right (Removed)   Resolved Date: 12/26/23  Date First Assessed/Time First Assessed: 02/01/23 1806   Location: Wrist  Wound Location Orientation: Anterior;Right       [REMOVED] Wound 02/01/23 Skin Tear Abrasion(s) Arm Left;Posterior;Proximal (Removed)   Resolved Date: 12/26/23  Date First Assessed/Time First Assessed: 02/01/23 1930   Primary Wound Type: Skin Tear  Traumatic Wound Type: Abrasion(s)  Location: Arm  Wound Location Orientation: Left;Posterior;Proximal       [REMOVED] Wound 09/29/23 Groin Right (Removed)   Resolved Date: 12/26/23  Date First Assessed/Time First Assessed: 09/29/23 0957   Location: Groin  Wound Location Orientation: Right  Wound Description (Comments): New Puncture site noted   Incision's 1st Dressing: ADHESIVE SKIN HIGH VISCOSITY EXOFIN ...       Subjective:          HPI    The patient presents for evaluation of left heel ulcer.  VAC started on Saturday, but he took it off twice at home already.  He is very non-compliant about off-loading and following medical care according to his wife and his wife wants to discuss in-patient treatment and rehab after.        The following portions of the patient's history were reviewed and updated as appropriate: allergies, current medications, past family history, past medical history, past social history, past surgical history, and problem  list.      PAST MEDICAL HISTORY:  Past Medical History:   Diagnosis Date   • Cerebrovascular accident (CVA) due to thrombosis of left middle cerebral artery (HCC) 07/29/2018   • Chronic kidney disease    • Coronary artery disease    • Diabetes mellitus (HCC)     not on meds   • Dialysis patient (HCC)     M-W-F   • Fistula     left upper arm for hemodialyis   • GERD (gastroesophageal reflux disease)    • History of coronary artery stent placement     x3   • Hypercholesteremia    • Hyperlipidemia    • Hypertension    • 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   • Limb alert care status     no BP/IV left arm   • Neuropathy    • Obesity    • Osteomyelitis (HCC)     last assessed 11/4/16-per son not currently   • PVC's (premature ventricular contractions)     sees  Cardio   • Stroke (HCC)     last weeof July 2018 [facility]   • TIA (transient ischemic attack) 10/28/2018   • Wears dentures    • Wears glasses        PAST SURGICAL HISTORY:  Past Surgical History:   Procedure Laterality Date   • ABDOMINAL SURGERY     • CARDIAC CATHETERIZATION N/A 05/02/2022    Procedure: Cardiac Coronary Angiogram;  Surgeon: Sam Davis MD;  [facility];  Service: Cardiology   • CARDIAC CATHETERIZATION N/A 05/02/2022    Procedure: Cardiac pci;  Surgeon: Sam Davis MD;  [facility];  Service: Cardiology   • CARDIAC CATHETERIZATION  02/01/2023    Procedure: Cardiac catheterization;  Surgeon: Sam Davis MD;  [facility]ARD[facility];  Service: Cardiology   • CARDIAC CATHETERIZATION N/A 02/01/2023    Procedure: Cardiac pci;  Surgeon: Sam Davis MD;  [facility];  Service: Cardiology   • CARDIAC CATHETERIZATION N/A 02/01/2023    Procedure: Cardiac Coronary Angiogram;  Surgeon: Sam Davis MD;  [facility];  Service: Cardiology   • CARDIAC CATHETERIZATION N/A 02/01/2023    Procedure: Cardiac other-IVUS;  Surgeon: Sam Davis MD;  Location: [facility]  "CARDIAC CATH LAB;  Service: Cardiology   • CHOLECYSTECTOMY      Percutaneous   • COLONOSCOPY     • CYSTOSCOPY     • IR LOWER EXTREMITY ANGIOGRAM  9/29/2023   • OTHER SURGICAL HISTORY      \"stimulator to control bowel movements\"   • AZ ESOPHAGOGASTRODUODENOSCOPY TRANSORAL DIAGNOSTIC N/A 09/27/2016    Procedure: ESOPHAGOGASTRODUODENOSCOPY (EGD);  Surgeon: Adele Rowe MD;  Location: [facility] GI LAB;  Service: Gastroenterology   • AZ LAPAROSCOPY SURG CHOLECYSTECTOMY N/A 02/29/2016    Procedure: LAPAROSCOPIC CHOLECYSTECTOMY ;  Surgeon: Cliff Roman DO;  Location: [facility] Main OR;  Service: General   • AZ SLCTV CATHJ 3RD+ ORD SLCTV ABDL PEL/LXTR BRNCH Left 9/29/2023    Procedure: Left leg angiogram with intervention;  Surgeon: Michelle Galvan MD;  Location: [facility] MAIN OR;  Service: Vascular   • ROTATOR CUFF REPAIR Right    • SPINAL CORD STIMULATOR IMPLANT      \"Medtronic interstim model # 3058- in lower back to control bowel movements-currently turned off-battery is dead\"   • TOE AMPUTATION Right 10/28/2016    Procedure: 3RD TOE AMPUTATION ;  Surgeon: Anjel Salas DPM;  Location: [facility] Main [facility];  Service:         ALLERGIES:  Patient has no known allergies.    MEDICATIONS:  Current Outpatient Medications   Medication Sig Dispense Refill   • aspirin (ECOTRIN LOW STRENGTH) 81 mg EC tablet Take 81 mg by mouth daily Resume on 8/14       • atorvastatin (LIPITOR) 40 mg tablet Take 1 tablet (40 mg total) by mouth daily with dinner 90 tablet 1   • Blood Glucose Monitoring Suppl (True Metrix Meter) w/Device KIT Use to test blood sugars 3 times daily 1 kit 0   • Blood Pressure Monitoring (BLOOD PRESSURE CUFF) MISC Use to check blood pressure before taking blood pressure medication and 1 hour after and follow instructions provided in discharge instructions based on the readings. 1 each 0   • Cholecalciferol (Vitamin D3) 1.25 MG (46340 UT) CAPS TAKE 1 CAPSULE BY MOUTH ONE TIME PER WEEK 12 capsule 3   • divalproex sodium (DEPAKOTE " SPRINKLE) 125 MG capsule TAKE 2 CAPSULES (250 MG TOTAL) BY MOUTH EVERY 12 (TWELVE) HOURS 360 capsule 1   • fluticasone (FLONASE) 50 mcg/act nasal spray 1 spray into each nostril daily 9.9 mL 0   • glucose blood (True Metrix Blood Glucose Test) test strip Use 1 each 3 (three) times a day Use as instructed 300 strip 1   • Lancets MISC Use 3 (three) times a day Use 3 times daily 300 each 0   • metoprolol succinate (TOPROL-XL) 50 mg 24 hr tablet Take 1 tablet (50 mg total) by mouth 2 (two) times a day 180 tablet 3   • midodrine (PROAMATINE) 2.5 mg tablet Take 1 tablet (2.5 mg total) by mouth as needed (for SBP<90 on dialysis days) 30 tablet 0   • prasugrel (EFFIENT) tablet Take 1 tablet (5 mg total) by mouth daily (Patient taking differently: Take 5 mg by mouth daily Takes with pudding) 90 tablet 3   • sacubitril-valsartan (Entresto) 24-26 MG TABS Take 1 tablet by mouth in the morning Bedtime 90 tablet 3   • Sevelamer Carbonate 2.4 g PACK VIGOROUSLY MIX CONTENTS OF 1 PACKET IN WATER (IT WILL NOT DISSOLVE) AND DRINK 3 TIMES DAILY WITH MEALS     • sodium hypochlorite (DAKIN'S HALF-STRENGTH) external solution Apply 1 Application topically every other day At each dressing change 473 mL 0     Current Facility-Administered Medications   Medication Dose Route Frequency Provider Last Rate Last Admin   • lidocaine (LMX) 4 % cream   Topical Once [place] DPM           SOCIAL HISTORY:  Social History     Socioeconomic History   • Marital status: /Civil Union     Spouse name: Not on file   • Number of children: Not on file   • Years of education: Not on file   • Highest education level: Not asked   Occupational History   • Occupation: disabled   Tobacco Use   • Smoking status: Never   • Smokeless tobacco: Never   Vaping Use   • Vaping status: Never Used   Substance and Sexual Activity   • Alcohol use: Never   • Drug use: No   • Sexual activity: Not on file     Comment: defer   Other Topics Concern   • Not on file   Social  History Narrative    Daily caffeine consumption 2-3 servings a day     Social Determinants of Health     Financial Resource Strain: Patient Declined (7/13/2023)    Overall Financial Resource Strain (CARDIA)    • Difficulty of Paying Living Expenses: Patient declined   Food Insecurity: No Food Insecurity (2/1/2023)    Hunger Vital Sign    • Worried About Running Out of Food in the Last Year: Never true    • Ran Out of Food in the Last Year: Never true   Transportation Needs: No Transportation Needs (7/13/2023)    PRAPARE - Transportation    • Lack of Transportation (Medical): No    • Lack of Transportation (Non-Medical): No   Physical Activity: Not on file   Stress: No Stress Concern Present (1/18/2019)    [place] [employer] - Occupational Stress Questionnaire    • Feeling of Stress : Only a little   Social Connections: Unknown (1/18/2019)    Social Connection and Isolation Panel [NHANES]    • Frequency of Communication with Friends and Family: Not on file    • Frequency of Social Gatherings with Friends and Family: Not on file    • Attends Gnosticist Services: Not on file    • Active Member of Clubs or Organizations: Not on file    • Attends Club or Organization Meetings: Not on file    • Marital Status:    Intimate Partner Violence: Not At Risk (1/18/2019)    Humiliation, Afraid, Rape, and Kick questionnaire    • Fear of Current or Ex-Partner: No    • Emotionally Abused: No    • Physically Abused: No    • Sexually Abused: No   Housing Stability: Unknown (2/1/2023)    Housing Stability Vital Sign    • Unable to Pay for Housing in the Last Year: No    • Number of Places Lived in the Last Year: Not on file    • Unstable Housing in the Last Year: No        Review of Systems   Constitutional:  Negative for chills and fever.   Respiratory:  Negative for cough and shortness of breath.    Cardiovascular:  Negative for chest pain.   Gastrointestinal:  Negative for nausea and vomiting.   Skin:   Positive for wound.   Neurological:  Positive for numbness. Negative for weakness.         Objective:       Wound 06/02/23 Diabetic Ulcer Heel Left (Active)   Enter Oliveros score: Oliveros Grade 2: Deep ulcer extended to ligament, tendon, joint capsule, bone, or deep fascia without abscess or osteomyelitis (OM) 01/18/24 0941   Wound Image Images linked 01/18/24 0941   Wound Description Slough;Yellow;Pink 01/18/24 0941   Lani-wound Assessment Intact;Fragile;Maceration 01/18/24 0941   Wound Length (cm) 1 cm 01/18/24 0941   Wound Width (cm) 2 cm 01/18/24 0941   Wound Depth (cm) 0.8 cm 01/18/24 0941   Wound Surface Area (cm^2) 2 cm^2 01/18/24 0941   Wound Volume (cm^3) 1.6 cm^3 01/18/24 0941   Calculated Wound Volume (cm^3) 1.6 cm^3 01/18/24 0941   Undermining 1 0.7 01/18/24 0941   Undermining 1 is depth extending from 1-3 o'clock 01/18/24 0941   Drainage Amount Moderate 01/18/24 0941   Drainage Description Tan;Yellow;Foul smelling 01/18/24 0941   Non-staged Wound Description Full thickness 01/18/24 0941       Temp (!) 96.7 °F (35.9 °C) (Temporal)   Resp 18     Physical Exam  Vitals and nursing note reviewed.   Constitutional:       General: He is not in acute distress.     Appearance: He is not toxic-appearing or diaphoretic.   Cardiovascular:      Rate and Rhythm: Normal rate and regular rhythm.      Pulses:           Dorsalis pedis pulses are 1+ on the left side.        Posterior tibial pulses are detected w/ Doppler on the left side.   Pulmonary:      Effort: Pulmonary effort is normal. No respiratory distress.   Musculoskeletal:         General: No signs of injury.      Right foot: No foot drop.      Left foot: No foot drop.   Feet:      Comments: Biphasic PTA left.  Skin:     General: Skin is warm.      Capillary Refill: Capillary refill takes less than 2 seconds.      Coloration: Skin is not cyanotic or mottled.      Findings: No abscess or erythema.      Nails: There is no clubbing.      Comments: Ulcer noted left  heel.  Wound bed is fibrous.  No purulence or redness.  No signs of active infection.  Wound depth is closed to periosteal tissues.  No significant improvement from the last visit.  See wound assessment and photo.     Neurological:      General: No focal deficit present.      Mental Status: He is alert and oriented to person, place, and time.      Cranial Nerves: No cranial nerve deficit.      Sensory: No sensory deficit.      Motor: No weakness.      Coordination: Coordination normal.   Psychiatric:         Mood and Affect: Mood normal.         Behavior: Behavior normal.         Thought Content: Thought content normal.         Judgment: Judgment normal.         Wound Instructions:  Orders Placed This Encounter   Procedures   • Wound cleansing and dressings Diabetic Ulcer Left Heel     Wound cleansing and dressings Diabetic Ulcer Left Heel       Wound location left heel  Change dressing daily until NPWT ([employer] VAC) is received   The dressing must stay dry and intact. You may shower with dressing protected by cast cover or bag. Or you may sponge bathe.   Moisten gauze with 1/4 strength dakins solution, apply to wound   Cover with gauze/ABD  Secure with rolled gauze and tape.      Pt to purchase just enough supplies from local pharmacy to get through until next week.      This [facility] applied [facility].     Continue use of heel relief shoe. Ensure no pressure on wound, offload with heel medic/prevalon or pillows to float wound off surfaces.     Once [employer]CI VAC is received:  Left heel NPWT (initiate once received)   Wound care 3x/wk and prn leakage  Cleanse the wound with nss or wound cleanser, rinse and pat dry  Apply skin prep and VAC film to periwound and area for bridge  Apply black foam to wound  Bridge off and seal at 125mmHg continuous suction.   May use kerlix to help protect dressing from dislodgement but ensure tubing is not compressed against skin on foot or leg.     Always bring one sponge packet and one  canister to wound care appts as those supplies are not stocked in the wound center.      If an issue arises with the NPWT, read the errors on the screen and follow directions. There is also a number to a [employer] clinician on the machine that you may call for troubleshooting during off work hours. If during working hours and [facility] to clear an [facility]. If the NPWT becomes completely inoperable, gently remove the dressing and apply either NSS moistened gauze or 1/4 strength dakins moistened gauze to wound, cover with dry gauze. [facility]     Standing Status:   Future     Standing Expiration Date:   1/18/2025   • Transfer to other facility     Order Specific Question:   Call back Phone Number:     Answer:   [phone]     Order Specific Question:   Request Type:     Answer:   Transfer to [facility] Facility [[id number]]     Order Specific Question:   Hospital Area:     Answer:   [facility] [[id number]]     Order Specific Question:   Reason for Transfer:     Answer:   Direct Admission [18]     Order Specific Question:   Service:     Answer:   Podiatry [[id number]]     Order Specific Question:   Level of Care:     Answer:   Med Surg [16]        Diagnosis ICD-10-CM Associated Orders   1. Ulcer of heel, left, with necrosis of muscle ([facility])  L97.423 Wound cleansing and dressings Diabetic Ulcer Left Heel     lidocaine (LMX) 4 % cream     Transfer to other facility      2. Type 2 diabetes mellitus with chronic kidney disease on chronic dialysis, without long-term current use of insulin ([facility])  E11.22 Transfer to other facility    N18.6     Z99.2       3. Peripheral arterial disease ([facility])  I73.9 Transfer to other facility

## 2024-01-18 NOTE — PROGRESS NOTES
ADT Notification received.  Patient presented to ED following wound care appt related to ulcer of heel.  OP SWCM no longer following patient's case but will remain available to assist if needed.

## 2024-01-19 ENCOUNTER — APPOINTMENT (INPATIENT)
Dept: DIALYSIS | Facility: HOSPITAL | Age: 64
DRG: 638 | End: 2024-01-19
Payer: MEDICARE
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
## 2024-01-19 LAB
ALBUMIN SERPL BCP-MCNC: 4 G/DL (ref 3.5–5)
ALP SERPL-CCNC: 94 U/L (ref 34–104)
ALT SERPL W P-5'-P-CCNC: 7 U/L (ref 7–52)
ANION GAP SERPL CALCULATED.3IONS-SCNC: 12 MMOL/L
ANION GAP SERPL CALCULATED.3IONS-SCNC: 20 MMOL/L
AST SERPL W P-5'-P-CCNC: 10 U/L (ref 13–39)
ATRIAL RATE: 63 BPM
BASOPHILS # BLD AUTO: 0.02 THOUSANDS/ÂΜL (ref 0–0.1)
BASOPHILS NFR BLD AUTO: 0 % (ref 0–1)
BILIRUB SERPL-MCNC: 0.51 MG/DL (ref 0.2–1)
BUN SERPL-MCNC: 46 MG/DL (ref 5–25)
BUN SERPL-MCNC: 63 MG/DL (ref 5–25)
CALCIUM SERPL-MCNC: 8.1 MG/DL (ref 8.4–10.2)
CALCIUM SERPL-MCNC: 8.1 MG/DL (ref 8.4–10.2)
CHLORIDE SERPL-SCNC: 108 MMOL/L (ref 96–108)
CHLORIDE SERPL-SCNC: 97 MMOL/L (ref 96–108)
CO2 SERPL-SCNC: 27 MMOL/L (ref 21–32)
CO2 SERPL-SCNC: 27 MMOL/L (ref 21–32)
CREAT SERPL-MCNC: 5.76 MG/DL (ref 0.6–1.3)
CREAT SERPL-MCNC: 7.59 MG/DL (ref 0.6–1.3)
EOSINOPHIL # BLD AUTO: 0.08 THOUSAND/ÂΜL (ref 0–0.61)
EOSINOPHIL NFR BLD AUTO: 2 % (ref 0–6)
ERYTHROCYTE [DISTWIDTH] IN BLOOD BY AUTOMATED COUNT: 15.7 % (ref 11.6–15.1)
GFR SERPL CREATININE-BSD FRML MDRD: 6 ML/MIN/1.73SQ M
GFR SERPL CREATININE-BSD FRML MDRD: 9 ML/MIN/1.73SQ M
GLUCOSE SERPL-MCNC: 116 MG/DL (ref 65–140)
GLUCOSE SERPL-MCNC: 137 MG/DL (ref 65–140)
GLUCOSE SERPL-MCNC: 183 MG/DL (ref 65–140)
GLUCOSE SERPL-MCNC: 91 MG/DL (ref 65–140)
GLUCOSE SERPL-MCNC: 96 MG/DL (ref 65–140)
HCT VFR BLD AUTO: 29 % (ref 36.5–49.3)
HGB BLD-MCNC: 9 G/DL (ref 12–17)
IMM GRANULOCYTES # BLD AUTO: 0.03 THOUSAND/UL (ref 0–0.2)
IMM GRANULOCYTES NFR BLD AUTO: 1 % (ref 0–2)
LYMPHOCYTES # BLD AUTO: 0.98 THOUSANDS/ÂΜL (ref 0.6–4.47)
LYMPHOCYTES NFR BLD AUTO: 21 % (ref 14–44)
MCH RBC QN AUTO: 30.3 PG (ref 26.8–34.3)
MCHC RBC AUTO-ENTMCNC: 31 G/DL (ref 31.4–37.4)
MCV RBC AUTO: 98 FL (ref 82–98)
MONOCYTES # BLD AUTO: 0.55 THOUSAND/ÂΜL (ref 0.17–1.22)
MONOCYTES NFR BLD AUTO: 12 % (ref 4–12)
NEUTROPHILS # BLD AUTO: 2.98 THOUSANDS/ÂΜL (ref 1.85–7.62)
NEUTS SEG NFR BLD AUTO: 64 % (ref 43–75)
NRBC BLD AUTO-RTO: 0 /100 WBCS
P AXIS: 34 DEGREES
PLATELET # BLD AUTO: 102 THOUSANDS/UL (ref 149–390)
PMV BLD AUTO: 11.4 FL (ref 8.9–12.7)
POTASSIUM SERPL-SCNC: 5.3 MMOL/L (ref 3.5–5.3)
POTASSIUM SERPL-SCNC: 5.5 MMOL/L (ref 3.5–5.3)
PR INTERVAL: 192 MS
PROT SERPL-MCNC: 7.2 G/DL (ref 6.4–8.4)
QRS AXIS: -26 DEGREES
QRSD INTERVAL: 156 MS
QT INTERVAL: 504 MS
QTC INTERVAL: 515 MS
RBC # BLD AUTO: 2.97 MILLION/UL (ref 3.88–5.62)
SODIUM SERPL-SCNC: 136 MMOL/L (ref 135–147)
SODIUM SERPL-SCNC: 155 MMOL/L (ref 135–147)
T WAVE AXIS: 29 DEGREES
VENTRICULAR RATE: 63 BPM
WBC # BLD AUTO: 4.64 THOUSAND/UL (ref 4.31–10.16)

## 2024-01-19 PROCEDURE — 93005 ELECTROCARDIOGRAM TRACING: CPT

## 2024-01-19 PROCEDURE — 99232 SBSQ HOSP IP/OBS MODERATE 35: CPT | Performed by: PHYSICIAN ASSISTANT

## 2024-01-19 PROCEDURE — 90935 HEMODIALYSIS ONE EVALUATION: CPT | Performed by: STUDENT IN AN ORGANIZED HEALTH CARE EDUCATION/TRAINING PROGRAM

## 2024-01-19 PROCEDURE — 82948 REAGENT STRIP/BLOOD GLUCOSE: CPT

## 2024-01-19 PROCEDURE — 97163 PT EVAL HIGH COMPLEX 45 MIN: CPT

## 2024-01-19 PROCEDURE — 97167 OT EVAL HIGH COMPLEX 60 MIN: CPT

## 2024-01-19 PROCEDURE — 80048 BASIC METABOLIC PNL TOTAL CA: CPT | Performed by: STUDENT IN AN ORGANIZED HEALTH CARE EDUCATION/TRAINING PROGRAM

## 2024-01-19 PROCEDURE — NC001 PR NO CHARGE: Performed by: PHYSICIAN ASSISTANT

## 2024-01-19 PROCEDURE — 85025 COMPLETE CBC W/AUTO DIFF WBC: CPT | Performed by: STUDENT IN AN ORGANIZED HEALTH CARE EDUCATION/TRAINING PROGRAM

## 2024-01-19 RX ORDER — LORAZEPAM 2 MG/ML
0.5 INJECTION INTRAMUSCULAR ONCE AS NEEDED
Status: DISCONTINUED | OUTPATIENT
Start: 2024-01-19 | End: 2024-01-24 | Stop reason: HOSPADM

## 2024-01-19 RX ORDER — MIDODRINE HYDROCHLORIDE 5 MG/1
10 TABLET ORAL
Status: DISCONTINUED | OUTPATIENT
Start: 2024-01-19 | End: 2024-01-24 | Stop reason: HOSPADM

## 2024-01-19 RX ORDER — LORAZEPAM 2 MG/ML
0.5 INJECTION INTRAMUSCULAR ONCE
Status: DISCONTINUED | OUTPATIENT
Start: 2024-01-19 | End: 2024-01-24 | Stop reason: HOSPADM

## 2024-01-19 RX ADMIN — MIDODRINE HYDROCHLORIDE 10 MG: 5 TABLET ORAL at 13:05

## 2024-01-19 RX ADMIN — ATORVASTATIN CALCIUM 40 MG: 40 TABLET, FILM COATED ORAL at 17:42

## 2024-01-19 RX ADMIN — ACETAMINOPHEN 650 MG: 325 TABLET, FILM COATED ORAL at 21:29

## 2024-01-19 RX ADMIN — HEPARIN SODIUM 5000 UNITS: 5000 INJECTION INTRAVENOUS; SUBCUTANEOUS at 05:37

## 2024-01-19 RX ADMIN — ASPIRIN 81 MG: 81 TABLET, COATED ORAL at 09:58

## 2024-01-19 RX ADMIN — DAKIN'S SOLUTION 0.125% (QUARTER STRENGTH): 0.12 SOLUTION at 17:42

## 2024-01-19 RX ADMIN — ACETAMINOPHEN 650 MG: 325 TABLET, FILM COATED ORAL at 15:29

## 2024-01-19 RX ADMIN — METOPROLOL SUCCINATE 50 MG: 50 TABLET, EXTENDED RELEASE ORAL at 09:58

## 2024-01-19 RX ADMIN — DIVALPROEX SODIUM 250 MG: 125 CAPSULE, COATED PELLETS ORAL at 09:58

## 2024-01-19 RX ADMIN — ACETAMINOPHEN 650 MG: 325 TABLET, FILM COATED ORAL at 05:37

## 2024-01-19 RX ADMIN — METOPROLOL SUCCINATE 50 MG: 50 TABLET, EXTENDED RELEASE ORAL at 21:29

## 2024-01-19 RX ADMIN — PRASUGREL 5 MG: 10 TABLET, FILM COATED ORAL at 17:41

## 2024-01-19 RX ADMIN — DIVALPROEX SODIUM 250 MG: 125 CAPSULE, COATED PELLETS ORAL at 21:29

## 2024-01-19 RX ADMIN — ONDANSETRON 4 MG: 2 INJECTION INTRAMUSCULAR; INTRAVENOUS at 09:58

## 2024-01-19 RX ADMIN — OXYCODONE HYDROCHLORIDE 5 MG: 5 TABLET ORAL at 02:06

## 2024-01-19 RX ADMIN — OXYCODONE HYDROCHLORIDE 5 MG: 5 TABLET ORAL at 09:27

## 2024-01-19 RX ADMIN — CEFEPIME 1000 MG: 1 INJECTION, POWDER, FOR SOLUTION INTRAMUSCULAR; INTRAVENOUS at 17:42

## 2024-01-19 NOTE — PROGRESS NOTES
Unable to perform dressing change to foot  prior to shift change; info passed on to olga lidia novak

## 2024-01-19 NOTE — PROGRESS NOTES
[facility]  Progress Note  Name: Asad Galloway I  MRN: [id number]  Unit/Bed#: NW8 861-02 I Date of Admission: 1/18/2024   Date of Service: 1/19/2024 I Hospital Day: 1    Assessment/Plan   * Nonhealing ulcer of heel (HCC)  Assessment & Plan  Pt presented with chronic nonhealing ulcer of the left heel, positive probe to bone concerning for clinical osteomyelitis. Sent in from podiatry office outpatient for direct admission   Patient afebrile, no leukocytosis and hemodynamically stable  Vascular surgery following,  S/p LEADS with diffuse disease but no evidence of significant stenosis or occlusion, toe pressures below healing range  No plans for surgical intervention at this time, consideration for possible angiography?  Podiatry following,  Plan for possible debridement   WBAT left foot with surgical shoe  Continue renally adjusted dose cefepime.  Follow wound cultures.  Consider ID consult pending clinical course.    Fall  Assessment & Plan  Per nursing staff patient slid out of wheelchair when attempting transport down to dialysis today, 1/19. This was witnessed by both transport staff and wife. Pt did not have head strike, no significant fall, slid to the floor. He was able to get back into bed.   Pt without any upper extremity pain, no evidence of significant bruising, full ROM of the lower extremities bilaterally. Only complaint of pain in the left heel where wound is noted   No indication for any imaging currently  PT/OT consultations  Per review wife concerned that she is unable to take care of him any longer at home, CM consult   Waist belt for safety as patient continuously trying to get out of bed, unable to be redirected     PAD (peripheral artery disease) ([facility])  Assessment & Plan  Peripheral artery disease status post LLE a gram with balloon angioplasty of PT and AT 9/2023.  Presented with nonhealing left heel ulcer as above.  Appreciate vascular surgery and podiatry  recommendations as above  Continue aspirin and statin.    Hypotension  Assessment & Plan  BP overall fairly stable  Continue midodrine 2.5 mg PRN with dialysis.    Mood disorder (HCC)  Assessment & Plan  Continue home Depakote 250 mg BID    Ischemic cardiomyopathy  Assessment & Plan  Most recent EF 40%.  Euvolemic on exam.  Continue Toprol XL 50 mg BID  Holding Entresto until vascular surgery plans as patient may require angiography.  Volume management via dialysis    CAD, multiple vessel  Assessment & Plan  Stable.  Continue aspirin, prasugrel, metoprolol and statin.  Pt was complaining of some chest pain at rest on 1/19, EKG obtained without change, symptoms resolved, continue to monitor     ESRD on dialysis ([facility])  Assessment & Plan  Lab Results   Component Value Date    EGFR 9 01/18/2024    EGFR 6 12/27/2023    EGFR 5 12/25/2023    CREATININE 5.76 (H) 01/18/2024    CREATININE 7.82 (H) 12/27/2023    CREATININE 8.83 (H) 12/25/2023     On hemodialysis, MWF schedule  Nephrology following,  Plan for HD today   Recheck sodium level, likely an error from this AM noted to be at 155    Anemia  Assessment & Plan  In the setting of end-stage renal disease.  Hemoglobin stable.  BAYLEE per nephrology.    Mixed hyperlipidemia  Assessment & Plan  Continue home atorvastatin 40 mg daily.    Type 2 diabetes mellitus with chronic kidney disease on chronic dialysis, without long-term current use of insulin ([facility])  Assessment & Plan  Lab Results   Component Value Date    HGBA1C 5.1 01/10/2024       Recent Labs     01/18/24  1730 01/18/24  2109 01/19/24  0721   POCGLU 97 128 137       Blood Sugar Average: Last 72 hrs:  (P) [id number]  Diet controlled at baseline   Sliding scale insulin with meals here   QID glucose checks   Monitor and adjust regimen as needed         VTE Pharmacologic Prophylaxis: VTE Score: 4 Moderate Risk (Score 3-4) - Pharmacological DVT Prophylaxis Ordered: heparin.    Mobility:   Basic Mobility Inpatient  Raw Score: 17  JH-HLM Goal: 5: Stand one or more mins  JH-HLM Achieved: 3: Sit at edge of bed  HLM Goal NOT achieved. Continue with multidisciplinary rounding and encourage appropriate mobility to improve upon HLM goals.    Patient Centered Rounds: I evaluated the patient without nursing staff present due to speaking to nurse outside patient's room     Discussions with Specialists or Other Care Team Provider: Discussed with vascular, podiatry, RN, CM and reviewed previous notes     Education and Discussions with Family / Patient: Updated  (wife) at bedside.    Total Time Spent on Date of Encounter in care of patient: 40 mins. This time was spent on one or more of the following: performing physical exam; counseling and coordination of care; obtaining or reviewing history; documenting in the medical record; reviewing/ordering tests, medications or procedures; communicating with other healthcare professionals and discussing with patient's family/caregivers.    Current Length of Stay: 1 day(s)  Current Patient Status: Inpatient   Certification Statement: The patient will continue to require additional inpatient hospital stay due to IV abx, ongoing podiatry and vascular recommendations, PT/OT  Discharge Plan: Anticipate discharge in >72 hrs to discharge location to be determined pending rehab evaluations.    Code Status: Level 1 - Full Code    Subjective:   Pt reports that he slid out of the wheelchair onto the floor while being transported to dialysis. Wife confirmed that he did not fall and strike his head. Pt reports he was having chest pain but this is now improving. Denies any shortness of breath, abdominal pain, nausea or vomiting. Complaining of pain in the left foot where his wound is.    Objective:     Vitals:   Temp (24hrs), Av.2 °F (36.8 °C), Min:97.3 °F (36.3 °C), Max:99 °F (37.2 °C)    Temp:  [97.3 °F (36.3 °C)-99 °F (37.2 °C)] 98.4 °F (36.9 °C)  HR:  [57-66] 66  Resp:  [14-18] 14  BP:  ()/(42-74) 114/62  SpO2:  [95 %-99 %] 99 %  Body mass index is 33.73 kg/m².     Input and Output Summary (last 24 hours):     Intake/Output Summary (Last 24 hours) at 1/19/2024 1014  Last data filed at 1/19/2024 0843  Gross per 24 hour   Intake 1020 ml   Output --   Net 1020 ml       Physical Exam:   Physical Exam  Vitals reviewed.   Constitutional:       Comments: Pt is in no acute distress lying in his hospital bed resting comfortably    HENT:      Head: Normocephalic.   Cardiovascular:      Rate and Rhythm: Normal rate and regular rhythm.   Pulmonary:      Effort: No respiratory distress.      Breath sounds: Normal breath sounds. No wheezing.   Abdominal:      General: Bowel sounds are normal. There is no distension.      Palpations: Abdomen is soft.      Tenderness: There is no abdominal tenderness.   Musculoskeletal:      Comments: LLE heel wound, dressing intact   Skin:     General: Skin is warm and dry.   Neurological:      Mental Status: He is alert.          Additional Data:     Labs:  Results from last 7 days   Lab Units 01/18/24  1455   WBC Thousand/uL 4.38   HEMOGLOBIN g/dL 10.1*   HEMATOCRIT % 33.7*   PLATELETS Thousands/uL 99*   NEUTROS PCT % 67   LYMPHS PCT % 20   MONOS PCT % 9   EOS PCT % 2     Results from last 7 days   Lab Units 01/18/24  1455   SODIUM mmol/L 155*   POTASSIUM mmol/L 5.3   CHLORIDE mmol/L 108   CO2 mmol/L 27   BUN mg/dL 46*   CREATININE mg/dL 5.76*   ANION GAP mmol/L 20   CALCIUM mg/dL 8.1*   ALBUMIN g/dL 4.0   TOTAL BILIRUBIN mg/dL 0.51   ALK PHOS U/L 94   ALT U/L 7   AST U/L 10*   GLUCOSE RANDOM mg/dL 183*         Results from last 7 days   Lab Units 01/19/24  0721 01/18/24  2109 01/18/24  1730   POC GLUCOSE mg/dl 137 128 97               Lines/Drains:  Invasive Devices       Peripheral Intravenous Line  Duration             Peripheral IV 01/18/24 Proximal;Right;Ventral (anterior) Forearm <1 day              Line  Duration             Hemodialysis AV Fistula Left Upper arm  -- days                          Imaging: Reviewed radiology reports from this admission including: LEADs    Recent Cultures (last 7 days):   Results from last 7 days   Lab Units 01/18/24  1408   GRAM STAIN RESULT  No polys seen*  4+ Gram positive cocci in pairs*  4+ Gram negative rods*  2+ Gram positive cocci in clusters*       Last 24 Hours Medication List:   Current Facility-Administered Medications   Medication Dose Route Frequency Provider Last Rate    acetaminophen  650 mg Oral Q8H [facility] Speedy Quintero MD      aspirin  81 mg Oral Daily Speedy Quintero MD      atorvastatin  40 mg Oral Daily With Dinner Speedy Quintero MD      cefepime  1,000 mg Intravenous Q24H Speedy Quintero MD 1,000 mg (01/18/24 1550)    divalproex sodium  250 mg Oral Q12H [facility] Speedy Quintero MD      heparin (porcine)  5,000 Units Subcutaneous Q8H [facility] Speedy Quintero MD      insulin lispro  1-6 Units Subcutaneous TID AC Speedy Quintero MD      metoprolol succinate  50 mg Oral BID Speedy Quintero MD      midodrine  2.5 mg Oral Before Dialysis Speedy Quintero MD      ondansetron  4 mg Intravenous Q6H PRN Speedy Quintero MD      oxyCODONE  2.5 mg Oral TID PRN Speedy Quintero MD      Or    oxyCODONE  5 mg Oral TID PRN Speedy Quintero MD      prasugrel  5 mg Oral Daily Speedy Quintero MD      sodium hypochlorite   Irrigation Daily Denisse Betancourt DPM          Today, Patient Was Seen By: Salma Adler PA-C    **Please Note: This note may have been constructed using a voice recognition system.**

## 2024-01-19 NOTE — PLAN OF CARE
Problem: SAFETY,RESTRAINT: NV/NON-SELF DESTRUCTIVE BEHAVIOR  Goal: Remains free of harm/injury (restraint for non violent/non self-detsructive behavior)  Description: INTERVENTIONS:  - Instruct patient/family regarding restraint use   - Assess and monitor physiologic and psychological status   - Provide interventions and comfort measures to meet assessed patient needs   - Identify and implement measures to help patient regain control  - Assess readiness for release of restraint   Outcome: Completed  Goal: Returns to optimal restraint-free functioning  Description: INTERVENTIONS:  - Assess the patient's behavior and symptoms that indicate continued need for restraint  - Identify and implement measures to help patient regain control  - Assess readiness for release of restraint   Outcome: Completed

## 2024-01-19 NOTE — PROGRESS NOTES
Pt continues to get oob; bed alarm on; pt wife at bedside and trying to turn off alarms; instructed wife, she is to not touch the alarms; wife agreeable at this time

## 2024-01-19 NOTE — OCCUPATIONAL THERAPY NOTE
Occupational Therapy Evaluation     Patient Name: Asad Galloway  Today's Date: 1/19/2024  Problem List  Principal Problem:    Nonhealing ulcer of heel (HCC)  Active Problems:    Type 2 diabetes mellitus with chronic kidney disease on chronic dialysis, without long-term current use of insulin (HCC)    Mixed hyperlipidemia    Anemia    ESRD on dialysis (HCC)    CAD, multiple vessel    Ischemic cardiomyopathy    Mood disorder (HCC)    Hypotension    PAD (peripheral artery disease) (HCC)    Fall    Past Medical History  Past Medical History:   Diagnosis Date    Cerebrovascular accident (CVA) due to thrombosis of left middle cerebral artery (HCC) 07/29/2018    Chronic kidney disease     Coronary artery disease     Diabetes mellitus (HCC)     not on meds    Dialysis patient (HCC)     M-W-F    Fistula     left upper arm for hemodialyis    GERD (gastroesophageal reflux disease)     History of coronary artery stent placement     x3    Hypercholesteremia     Hyperlipidemia     Hypertension     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    Limb alert care status     no BP/IV left arm    Neuropathy     Obesity     Osteomyelitis (HCC)     last assessed 11/4/16-per son not currently    PVC's (premature ventricular contractions)     sees  Cardio    Stroke ([facility])     last weeof July 2018 [facility]    TIA (transient ischemic attack) 10/28/2018    Wears dentures     Wears glasses      Past Surgical History  Past Surgical History:   Procedure Laterality Date    ABDOMINAL SURGERY      CARDIAC CATHETERIZATION N/A 05/02/2022    Procedure: Cardiac Coronary Angiogram;  Surgeon: Sam Davis MD;  [facility]: [facility] CARDI[facility] CATH LAB;  Service: Cardiology    CARDIAC CATHETERIZATION N/A 05/02/2022    Procedure: Cardiac pci;  Surgeon: Sam Davis MD;  [facility]: [facility] CARDIAC CATH LAB;  Service: Cardiology    CARDIAC CATHETERIZATION  02/01/2023    Procedure: Cardiac catheterization;  Surgeon: Sam Davis MD;  Location: [facility] CARDIAC  "CATH LAB;  Service: Cardiology    CARDIAC CATHETERIZATION N/A 02/01/2023    Procedure: Cardiac pci;  Surgeon: Sam Davis MD;  Location: [facility] CARDIAC CATH LAB;  Service: Cardiology    CARDIAC CATHETERIZATION N/A 02/01/2023    Procedure: Cardiac Coronary Angiogram;  Surgeon: Sam Davis MD;  Location: [facility] CARDIAC CATH LAB;  Service: Cardiology    CARDIAC CATHETERIZATION N/A 02/01/2023    Procedure: Cardiac other-IVUS;  Surgeon: Sam Davis MD;  Location: [facility] CARDIAC CATH LAB;  Service: Cardiology    CHOLECYSTECTOMY      Percutaneous    COLONOSCOPY      CYSTOSCOPY      IR LOWER EXTREMITY ANGIOGRAM  9/29/2023    OTHER SURGICAL HISTORY      \"stimulator to control bowel movements\"    MA ESOPHAGOGASTRODUODENOSCOPY TRANSORAL DIAGNOSTIC N/A 09/27/2016    Procedure: ESOPHAGOGASTRODUODENOSCOPY (EGD);  Surgeon: Adele Rowe MD;  Location: [facility] GI LAB;  Service: Gastroenterology    MA LAPAROSCOPY SURG CHOLECYSTECTOMY N/A 02/29/2016    Procedure: LAPAROSCOPIC CHOLECYSTECTOMY ;  Surgeon: Cliff Roman DO;  Location: [facility] Main OR;  Service: General    MA SLCTV CATHJ 3RD+ ORD SLCTV ABDL PEL/LXTR BRNCH Left 9/29/2023    Procedure: Left leg angiogram with intervention;  Surgeon: Michelle Galvan MD;  Location: [facility] MAIN OR;  Service: Vascular    ROTATOR CUFF REPAIR Right     SPINAL CORD STIMULATOR IMPLANT      \"Medtronic interstim model # 3058- in lower back to control bowel movements-currently turned off-battery is dead\"    TOE AMPUTATION Right 10/28/2016    Procedure: 3RD TOE AMPUTATION ;  Surgeon: Anjel Salas DPM;  Location: [facility] Main OR;  Service:            01/19/24 1147   OT Last Visit   OT Visit Date 01/19/24   Note Type   Note type Evaluation   Pain Assessment   Pain Assessment Tool 0-10   Pain Score 5   Pain Location/Orientation Orientation: Left;Location: Foot   Hospital Pain Intervention(s) Repositioned;Ambulation/increased activity   Restrictions/Precautions   Weight Bearing Precautions Per Order Yes   LLE Weight " "Bearing Per Order WBAT  (in heel offloading shoe)   Braces or Orthoses   (LLE heel offloading shoe)   Other Precautions Cognitive;Chair Alarm;Bed Alarm;Multiple lines;Fall Risk;Pain   Home Living   Type of Home House   Home Layout Two level  (bi-level home, enters into lower level with 0STE, primarily resides on lower level with full bathroom per wife)   Bathroom Shower/Tub Walk-in shower   Bathroom Toilet Raised   Bathroom Equipment Grab bars in shower;Shower chair;Grab bars around toilet   Home Equipment Walker  (did not use PTA)   Prior Function   Level of [place] Needs assistance with IADLS;Needs assistance with ADLs   Lives With Spouse;Son;Daughter   Receives Help From Family   IADLs Family/Friend/Other provides transportation;Family/Friend/Other provides meals;Family/Friend/Other provides medication management   Falls in the last 6 months 1 to 4  (1 per report)   Vocational Retired   Lifestyle   Autonomy Pt reports being I with ADLs; however, wife reports helping with depends. A for IADLs, I fnxl mobility, (-)    Reciprocal Relationships Supportive family - lives with spouse, son, and dtr. Other son and DIL live next door. Son stays with pt in AM until wife gets home in afternoon. Pt never alone   Service to Others Retired   Intrinsic Gratification Family feud   Subjective   Subjective \"they love me\"   ADL   Where Assessed Edge of bed   Eating Assistance 5  Supervision/Setup   Grooming Assistance 5  Supervision/Setup   UB Bathing Assistance 5  Supervision/Setup   LB Bathing Assistance 3  Moderate Assistance   UB Dressing Assistance 5  Supervision/Setup   LB Dressing Assistance 3  Moderate Assistance   Toileting Assistance  4  Minimal Assistance   Bed Mobility   Supine to Sit 4  Minimal assistance   Additional items Assist x 1;Bedrails;HOB elevated;Increased time required   Sit to Supine Unable to assess   Transfers   Sit to Stand 4  Minimal assistance   Additional items Assist x 1;Increased time " required   Stand to Sit 4  Minimal assistance   Additional items Assist x 1;Increased time required   Additional Comments transfers with RW   Functional Mobility   Functional Mobility 4  Minimal assistance   Additional Comments Ax1 - increased cueing for RW mgmt + safety   Additional items Rolling walker   Balance   Static Sitting Fair   Dynamic Sitting Fair -   Static Standing Fair -   Dynamic Standing Poor +   Ambulatory Poor +   Activity Tolerance   Activity Tolerance Patient tolerated treatment well   Medical Staff Made Aware PT Mara   Nurse Made Aware RN clearance for session   RUE Assessment   RUE Assessment WFL   LUE Assessment   LUE Assessment WFL   Cognition   Overall Cognitive Status Impaired   Arousal/Participation Responsive;Cooperative   Attention Attends with cues to redirect   Orientation Level Oriented to person;Oriented to place  (oriented to month, states year is 2025)   Memory Decreased short term memory   Following Commands Follows one step commands with increased time or repetition   Comments Pt cooperative during session, emotionally labile. Pt with decreased safety awareness, impulsive   Assessment   Limitation Decreased ADL status;Decreased UE ROM;Decreased UE strength;Decreased endurance;Decreased cognition;Decreased Safe judgement during ADL;Decreased self-care trans;Decreased high-level ADLs   Prognosis Fair   Assessment Pt is a 63 y.o. male admitted to [facility] on 1/18/[facility]24 w/ Nonhealing ulcer of heel (HCC). No acute surgical intervention per vascular/podiatry.  has a past medical history of CVA, Chronic kidney disease, Coronary artery disease, Diabetes mellitus, Dialysis patient, Fistula, GERD, Hypercholesteremia, Hyperlipidemia, Hypertension, Infectious viral hepatitis, Neuropathy, Obesity, Osteomyelitis, PVC's, Stroke, and TIA. Pt with active OT orders and up and OOB as tolerated orders. Pt seen as a co-evaluation with PT due to the patient's co-morbidities, clinically unstable  presentation/clinical complexity, and present impairments. As per report, pta, resides with his wife, son, and dtr in a 2STH, 0STE. Pt was primarily I w/  ADLS and family A with IADLS, (-) drove. Upon evaluation, pt currently requires MIN A with RW for transfers and mobility. Pt currently requires S eating, S grooming, S UB ADLs, MOD A LB ADLs, and MIN A toileting. Pt is limited at this time 2*: pain, endurance, activity tolerance, functional mobility, balance, functional standing tolerance, decreased I w/ ADLS/IADLS, strength, cognitive impairments, and decreased safety awareness.The following Occupational Performance Areas to address include: eating, grooming, bathing/shower, toilet hygiene, dressing, functional mobility, community mobility, and clothing management. Pt to benefit from immediate acute skilled OT to address above deficits, improve overall functional independence, maximize fnxl mobility and reduce caregiver burden. From OT standpoint, recommendation at time of d/c would be home with skilled therapy - wife reports being comfortable taking pt home at current level, pt has 24/7 supervision.  Pt was left after session with alarm on and all current needs met. The patient's raw score on the AM-PAC Daily Activity Inpatient Short Form is 16. A raw score of less than 19 suggests the patient may benefit from discharge to post-acute rehabilitation services. Please refer to the recommendation of the Occupational Therapist for safe discharge planning.   Goals   Patient Goals to go to the bathroom   LT Time Frame 10-14   Plan   Treatment Interventions ADL retraining;Functional transfer training;UE strengthening/ROM;Endurance training;Patient/family training;Cognitive reorientation;Equipment evaluation/education;Compensatory technique education;Continued evaluation;Energy conservation;Activityengagement   Goal Expiration Date 02/02/24   OT Frequency 2-3x/wk   Discharge Recommendation   Rehab Resource Intensity  Level, OT III (Minimum Resource Intensity)   AM-PAC Daily Activity Inpatient   Lower Body Dressing 2   Bathing 2   Toileting 3   Upper Body Dressing 3   Grooming 3   Eating 3   Daily Activity Raw Score 16   Daily Activity Standardized Score (Calc for Raw Score >=11) 35.96   AM-PAC Applied Cognition Inpatient   Following a Speech/Presentation 3   Understanding Ordinary Conversation 4   Taking Medications 3   Remembering Where Things Are Placed or Put Away 3   Remembering List of 4-5 Errands 2   Taking Care of Complicated Tasks 2   Applied Cognition Raw Score 17   Applied Cognition Standardized Score 36.52     GOALS    - Pt will complete UB dressing/self care/bathing w/ mod I using adaptive device and DME as needed    - Pt will complete LB dressing/self care/bathing w/ mod I using adaptive device and DME as needed    - Pt will complete toileting w/ mod I w/ G hygiene/thoroughness using DME as needed    - Pt will improve functional transfers to Mod I on/off all surfaces using DME as needed w/ G balance/safety     - Pt will improve functional mobility during ADL/IADL/leisure tasks to Mod I using DME as needed w/ G balance/safety     - Pt will demonstrate G carryover of pt/caregiver education and training as appropriate.    - Pt will demonstrate 100% carryover of energy conservation techniques t/o functional I/ADL/leisure tasks w/o cues s/p skilled education    - Pt will engage in ongoing cognitive assessment w/ G participation to assist w/ safe d/c planning/recommendations    - Pt will increase static and dynamic standing/sitting balance to F+  using AD/DME as needed to increase safety and I during fnxl transfers and ADLs    - Pt will maintain upright sitting position for at least 20 min during dynamic fnxl activity with F+  balance and endurance to improve ADL performance and independence, as well as reduce risk of falls       Denisse Wetzel MS, OTR/L

## 2024-01-19 NOTE — PROGRESS NOTES
NEPHROLOGY PROGRESS NOTE   Asad Galloway 63 y.o. male MRN: [id number]  Unit/Bed#: NW8 861-02 Encounter: [id number]  Reason for Consult: Management of ESRD    ASSESSMENT AND PLAN:  62 yo with PMH of ESRD on HD MWF, CVA, ICM, HTN, anemia, chronic left heel wound p/w nonhealing ulcer.  Nephrology is consulted for management of ESRD    PLAN:    #ESRD on HD MWF  Dialysis unit/days:[facility]  Access: Left AV fistula  On HD today, well tolerated   Renal Diet  Fluid restriction 1-1.5L/d  Adjust medications to GFR<10  Avoid opioids     #Volume status/hypertension:  Volume: Euvolemic  Blood pressure: Normotensive, /62  Low-sodium diet  Will attempt UF on HD  Known to have intradialytic hypotension    #Secondary Hyperparasitoidism   Low phosphorus diet    # Anemia of Kidney Disease  Current hemoglobin: 10.1 mg/dL  Treatment:  No EPO due to history of CVA  Transfuse for hemoglobin less than 7.0 per primary service      # Nonhealing ulcer  Possible debridement  Management as per primary team    HEMODIALYSIS PROCEDURE NOTE  The patient was seen and examined on hemodialysis. The patient is tolerating the procedure well.  Time: 3 hours  Sodium: 135 Blood flow: 400   Dialyzer: F160 Potassium: 2 Dialysate flow: 600   Access: AVF Bicarbonate: 35 Ultrafiltration goal: 4.5L   Medications on HD: none     SUBJECTIVE:  Patient seen and examined at bedside. No chest pain, shortness of breath      OBJECTIVE:  Current Weight: Weight - Scale: 94.8 kg (209 lb)  Vitals:    01/19/24 0843   BP: 114/62   Pulse: 66   Resp: 14   Temp: 98.4 °F (36.9 °C)   SpO2: 99%       Intake/Output Summary (Last 24 hours) at 1/19/2024 1049  Last data filed at 1/19/2024 0843  Gross per 24 hour   Intake 1020 ml   Output --   Net 1020 ml     Wt Readings from Last 3 Encounters:   01/18/24 94.8 kg (209 lb)   01/13/24 94.8 kg (209 lb)   01/09/24 94.8 kg (209 lb)     Temp Readings from Last 3 Encounters:   01/19/24 98.4 °F (36.9 °C) (Oral)   01/18/24 (!) 96.7 °F (35.9  °C) (Temporal)   01/13/24 98.1 °F (36.7 °C) (Temporal)     BP Readings from Last 3 Encounters:   01/19/24 114/62   01/18/24 120/56   01/13/24 108/60     Pulse Readings from Last 3 Encounters:   01/19/24 66   01/18/24 57   01/13/24 68      General:  no acute distress at this time  Skin:  No acute rash  Eyes:  No scleral icterus and noninjected  ENT:  mucous membranes moist  Neck:  no carotid bruits  Chest:  Clear to auscultation percussion, good respiratory effort, no use of accessory respiratory muscles  CVS:  Regular rate and rhythm without rub   Abdomen:  soft and nontender   Extremities: no significant lower extremity edema  Neuro:  No gross focality  Psych:  Alert , cooperative   dialysis access: Left aneurysmatic AV fistula with good bruit and thrill      Medications:    Current Facility-Administered Medications:     acetaminophen (TYLENOL) tablet 650 mg, 650 mg, Oral, Q8H MICH, Speedy Quintero MD, 650 mg at 01/19/24 0537    aspirin (ECOTRIN LOW STRENGTH) EC tablet 81 mg, 81 mg, Oral, Daily, Speedy Quintero MD, 81 mg at 01/19/24 0958    atorvastatin (LIPITOR) tablet 40 mg, 40 mg, Oral, Daily With Dinner, Speedy Quintero MD, 40 mg at 01/18/24 1548    cefepime (MAXIPIME) 1,000 mg in dextrose 5 % 50 mL IVPB, 1,000 mg, Intravenous, Q24H, Speedy Quintero MD, Last Rate: 100 mL/hr at 01/18/24 1550, 1,000 mg at 01/18/24 1550    divalproex sodium (DEPAKOTE SPRINKLE) capsule 250 mg, 250 mg, [facility], [facility]12H [facility], 250 mg at 01/19/24 0958    heparin (porcine) subcutaneous injection 5,000 Units, 5,000 Units, Subcutaneous, Q8H [facility], 5,000 Units at 01/19/24 0537    insulin lispro (HumaLOG) 100 units/mL subcutaneous injection 1-6 Units, 1-6 Units, Subcutaneous, TID AC **AND** Fingerstick Glucose (POCT), , , TID AC, Speedy Quintero MD    metoprolol succinate (TOPROL-XL) 24 hr tablet 50 mg, 50 mg, Oral, BID, Speedy Quintero MD, 50 mg at 01/19/24 0958    midodrine (PROAMATINE) tablet 2.5 mg, 2.5 mg, Oral, Before Dialysis, Speedy  "MD Ingrid    ondansetron (ZOFRAN) injection 4 mg, 4 mg, Intravenous, Q6H PRN, Speedy Quintero MD, 4 mg at 01/19/24 0958    oxyCODONE (ROXICODONE) split tablet 2.5 mg, 2.5 mg, Oral, TID PRN **OR** oxyCODONE (ROXICODONE) IR tablet 5 mg, 5 mg, Oral, TID PRN, Speedy Quintero MD, 5 mg at 01/19/24 0927    prasugrel (EFFIENT) tablet 5 mg, 5 mg, Oral, Daily, Speedy Quintero MD, 5 mg at 01/18/24 1546    sodium hypochlorite (DAKIN'S QUARTER-STRENGTH) 0.125 percent topical solution, , Irrigation, Daily, Denisse Betancourt DPM    Laboratory Results:  Results from last 7 days   Lab Units 01/18/24  1455   WBC Thousand/uL 4.38   HEMOGLOBIN g/dL 10.1*   HEMATOCRIT % 33.7*   PLATELETS Thousands/uL 99*   SODIUM mmol/L 155*   POTASSIUM mmol/L 5.3   CHLORIDE mmol/L 108   CO2 mmol/L 27   BUN mg/dL 46*   CREATININE mg/dL 5.76*   CALCIUM mg/dL 8.1*       VAS ARTERIAL DUPLEX- LOWER LIMB BILATERAL   Final Result by Stanley Holloway MD (01/18 1932)      XR heel / calcaneus 2+ vw left   Final Result by Panda Funes MD (01/19 0807)      Soft tissue wound posterior to the calcaneus. No radiographic evidence of osteomyelitis.            Workstation performed: [id number]         XR foot 3+ vw left   Final Result by Panda Funes MD (01/19 0807)      Soft tissue wound posterior to the calcaneus. No radiographic evidence of osteomyelitis.            Workstation performed: [id number]             Portions of the record may have been created with voice recognition software. Occasional wrong word or \"sound a like\" substitutions may have occurred due to the inherent limitations of voice recognition software. Read the chart carefully and recognize, using context, where substitutions have occurred.    "

## 2024-01-19 NOTE — ASSESSMENT & PLAN NOTE
Lab Results   Component Value Date    HGBA1C 5.1 01/10/2024       Recent Labs     01/18/24  1730 01/18/24  2109 01/19/24  0721   POCGLU 97 128 137       Blood Sugar Average: Last 72 hrs:  (P) [id number]  Diet controlled at baseline   Sliding scale insulin with meals here   QID glucose checks   Monitor and adjust regimen as needed

## 2024-01-19 NOTE — ASSESSMENT & PLAN NOTE
Lab Results   Component Value Date    EGFR 9 01/18/2024    EGFR 6 12/27/2023    EGFR 5 12/25/2023    CREATININE 5.76 (H) 01/18/2024    CREATININE 7.82 (H) 12/27/2023    CREATININE 8.83 (H) 12/25/2023     On hemodialysis, MWF schedule  Nephrology following,  Plan for HD today   Recheck sodium level, likely an error from this AM noted to be at 155

## 2024-01-19 NOTE — ED NOTES
"Pt rang call bell stating he was in pain. I advised him I would message the doctor to get additional pain meds. Pt rang call bell 2 minutes later screaming at me because he was in pain. Pt continued to scream at me because he was in pain a because \"I kept him here all day.\" Wife at bedside telling pt to stop yelling. I explained I needed more than 2 minutes to get him pain medications and that I had no control over how long he was in the ED. Pt waited for a bed for hours. When bed became available pt got reassigned to an occupied bed. Frequent updates regarding IP bed status provided to pt throughout his visit.     Jessica Valero RN  01/18/24 2033    "

## 2024-01-19 NOTE — PHYSICAL THERAPY NOTE
PHYSICAL THERAPY EVALUATION  NAME:  Asad Galloway  DATE: 01/19/24    AGE:   63 y.o.  Mrn:   [id number]  ADMIT DX:  Ulcer of heel (HCC) [L97.409]  Non-healing ulcer (HCC) [L98.499]  ESRD on dialysis (HCC) [N18.6, Z99.2]  PAD (peripheral artery disease) ([facility]) [I73.9]    Past Medical History:   Diagnosis Date    Cerebrovascular accident (CVA) due to thrombosis of left middle cerebral artery ([facility]) 07/29/2018    Chronic kidney disease     Coronary artery disease     Diabetes mellitus (HCC)     not on meds    Dialysis patient ([facility])     M-W-F    Fistula     left upper arm for hemodialyis    GERD (gastroesophageal reflux disease)     History of coronary artery stent placement     x3    Hypercholesteremia     Hyperlipidemia     Hypertension     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    Limb alert care status     no BP/IV left arm    Neuropathy     Obesity     Osteomyelitis ([facility])     last assessed 11/4/16-per son not currently    PVC's (premature ventricular contractions)     sees [facility]    Stroke ([facility])     last weeof July 2018 [facility]    TIA (transient ischemic attack) 10/28/2018    Wears dentures     Wears glasses      Past Surgical History:   Procedure Laterality Date    ABDOMINAL SURGERY      CARDIAC CATHETERIZATION N/A 05/02/2022    Procedure: Cardiac Coronary Angiogram;  Surgeon: Sam Davis MD;  [facility];  Service: Cardiology    CARDIAC CATHETERIZATION N/A 05/02/2022    Procedure: Cardiac pci;  Surgeon: Sam Davis MD;  [facility];  Service: Cardiology    CARDIAC CATHETERIZATION  02/01/2023    Procedure: Cardiac catheterization;  Surgeon: Sam Davis MD;  [facility]ARD[facility];  Service: Cardiology    CARDIAC CATHETERIZATION N/A 02/01/2023    Procedure: Cardiac pci;  Surgeon: Sam Davis MD;  [facility];  Service: Cardiology    CARDIAC CATHETERIZATION N/A 02/01/2023    Procedure: Cardiac Coronary Angiogram;  Surgeon:  "Sam Davis MD;  [facility];  Service: Cardiology    CARDIAC CATHETERIZATION N/A 02/01/2023    Procedure: Cardiac other-IVUS;  Surgeon: Sam Davis MD;  [facility];  Service: Cardiology    CHOLECYSTECTOMY      Percutaneous    COLONOSCOPY      CYSTOSCOPY      IR LOWER EXTREMITY ANGIOGRAM  9/29/2023    OTHER SURGICAL HISTORY      \"stimulator to control bowel movements\"    DC ESOPHAGOGASTRODUODENOSCOPY TRANSORAL DIAGNOSTIC N/A 09/27/2016    Procedure: ESOPHAGOGASTRODUODENOSCOPY (EGD);  Surgeon: Adele Rowe MD;  Location: [facility] GI LAB;  Service: Gastroenterology    DC LAPAROSCOPY SURG CHOLECYSTECTOMY N/A 02/29/2016    Procedure: LAPAROSCOPIC CHOLECYSTECTOMY ;  Surgeon: Cliff Roman DO;  Location: [facility] Main [facility];  Service: General    DC SLCTV CATHJ 3RD+ ORD SLCTV ABDL PEL/LXTR BRNCH Left 9/29/2023    Procedure: Left leg angiogram with intervention;  Surgeon: Michelle Galvan MD;  Location: [facility];  Service: Vascular    ROTATOR CUFF REPAIR Right     SPINAL CORD STIMULATOR IMPLANT      \"Medtronic interstim model # 3058- in lower back to control bowel movements-currently turned off-battery is dead\"    TOE AMPUTATION Right 10/28/2016    Procedure: 3RD TOE AMPUTATION ;  Surgeon: Anjel Salas DPM;  Location: [facility];  Service:        Length Of Stay: 1  PHYSICAL THERAPY EVALUATION :    01/19/24 1200   Note Type   Note type Orthotic Management/Training   Pain Assessment   Pain Assessment Tool 0-10   Pain Score 5   Pain Location/Orientation Orientation: Left;Location: Foot   Pain Onset/Description Onset: Ongoing   Hospital Pain Intervention(s) Emotional support;Repositioned;Ambulation/increased activity   Restrictions/Precautions   Weight Bearing Precautions Per Order Yes   LLE Weight Bearing Per Order (S)  WBAT  (in heel offloading shoe)   Braces or Orthoses   (LLE heel offloading shoe)   Other Precautions Impulsive;Cognitive;Chair Alarm;Bed Alarm;Multiple lines;Fall " Risk;Pain   Home Living   Type of Home House   Home Layout Two level  (0 RAFAT- pt 1* stays on lower level)   Bathroom Shower/Tub Walk-in shower   Bathroom Toilet Raised   Bathroom Equipment Grab bars in shower   Home Equipment Walker  (reports not using PTA)   Prior Function   Level of Roby Needs assistance with IADLS;Needs assistance with ADLs   Lives With Spouse;Son;Daughter   Receives Help From Family   IADLs Family/Friend/Other provides transportation;Family/Friend/Other provides meals;Family/Friend/Other provides medication management   Falls in the last 6 months 1 to 4  (1 per report)   Vocational Retired   Cognition   Overall Cognitive Status Impaired   Attention Attends with cues to redirect   Orientation Level Oriented to person;Oriented to place  (oriented to month, states year is 2025)   Memory Decreased short term memory   Following Commands Follows one step commands with increased time or repetition   Comments pt overall cooperative;; impulsive w/ limtied awareness of deficits- cues required for saety throughout   RUE Assessment   RUE Assessment WFL   LUE Assessment   LUE Assessment WFL   RLE Assessment   RLE Assessment WFL  (functionally at least 3+/5 throughout)   LLE Assessment   LLE Assessment WFL   Bed Mobility   Supine to Sit 4  Minimal assistance   Additional items Assist x 1;Impulsive;Verbal cues;LE management   Transfers   Sit to Stand 4  Minimal assistance   Stand to Sit 4  Minimal assistance   Ambulation/Elevation   Gait pattern Improper Weight shift;Decreased foot clearance;Inconsistent lubna   Gait Assistance 4  Minimal assist   Assistive Device Rolling walker   Distance 20+20' to and from bathroom w/ RW cues for safety and attention to task required.   Ambulation/Elevation Additional Comments sposue present - education on providing min/ SBA for safety- spouise reports feeling comfortable providing this level of  assist as she has done this prior for him   Balance   Static Sitting  Fair +   Dynamic Sitting Fair   Static Standing Fair -   Dynamic Standing Poor +   Ambulatory Poor +   Activity Tolerance   Activity Tolerance Patient tolerated treatment well   Medical Staff Made Aware BABAK norris   Nurse Made Aware yes- updated   Assessment   Prognosis Fair   Problem List Decreased strength;Decreased endurance;Impaired balance;Decreased mobility;Decreased cognition;Impaired judgement;Decreased safety awareness;Impaired sensation;Obesity;Decreased skin integrity;Pain   Assessment Pt is 63 y.o. male seen for PT evaluation s/p admit to [facility] on 1/18/2024  w/ Nonhealing ulcer of heel (HCC).   No acute surgical intervention per vascular/podiatry.  has a past medical history of CVA, Chronic kidney disease, Coronary artery disease, Diabetes mellitus, Dialysis patient, Fistula, GERD, Hypercholesteremia, Hyperlipidemia, Hypertension, Infectious viral hepatitis, Neuropathy, Obesity, Osteomyelitis, PVC's, Stroke, and TIA.   At baseline, pt resides w/ spouse son and daughter in a 2H w/ 0 RAFAT pt was indep w/ ADLs and was ambualtory in home w/ RW at baseline- pt is not left alone per family and they are available to assist . Currently,  pt  is requiring Roni A for bed skills; Roni for functional transfers and Roni for ambulation w/ RW + heel offloading shoe L. Pt is impulsive w/ poor safety awareness high fall risk..   Pt presents  currently w/ overall mobility deficits 2* to: obesity; heel wound; incontinence; limited flexibility;  generalized weakness/ deconditioning; decreased endurance; decreased activity tolerance; decreased coordination; impaired balance; gait deviations; decreased safety awareness; SOB/CÁRDENAS; fatigue; impaired safety and judgement; impulsivity;  limited insight into current deficits; bed/ chair alarms; multiple lines; Pt currently at risk for falls.  (Please find additional objective findings from PT assessment regarding body systems outlined above.) Pt will continue to benefit  from skilled PT interventions to address stated impairments; to maximize functional potential; for ongoing pt/ family training; and DME needs.  PT is recommending PT Resource Intensity Level  2 on d/c.- spouse and son present for eval- spouse / family feeling comfortable assisting pt at CLOF and family provides 24/7 S/A- PT recommendign home w/ home care / family support and use of RW + heel offloading shoe       PT will follow for STG as outlined on eval. Pt/ family agreeable to PT POC and goals as stated.   Goals   Patient Goals to use the bathroom   STG Expiration Date 02/02/24   Short Term Goal #1 In 14 days pt will:  Complete bed mobility skills with S to facilitate safe return to previous living environment and decrease burden on caregivers.  Perform all functional transfers with S  to facilitate safe return to previous living environment.    Ambulate  with RW 150x2 ' without LOB and stable vitals for safe ambulation in home/ community environment.      Improve balance by 1 grade in order to decrease fall risk.    Improve LE strength grades by 1 to increase independence w/ all functional mobility, transfers and gait.  Actively participate w/ PT for ongoing pt and family education; DME needs and D/C planning to promote highest level of function in least restrictive environment.   PT Treatment Day 0   Discharge Recommendation   Rehab Resource Intensity Level, PT II (Moderate Resource Intensity)   Equipment Recommended Walker  (has at home for d/c)   Walker Package Recommended Wheeled walker   AM-PAC Basic Mobility Inpatient   Turning in Flat Bed Without Bedrails 4   Lying on Back to Sitting on Edge of Flat Bed Without Bedrails 4   Moving Bed to Chair 3   Standing Up From Chair Using Arms 3   Walk in Room 2   Climb 3-5 Stairs With Railing 2   Basic Mobility Inpatient Raw Score 18   Basic Mobility Standardized Score 41.05   Highest Level Of Mobility   JH-HLM Goal 6: Walk 10 steps or more   JH-HLM Achieved 7: Walk  25 feet or more   End of Consult   Patient Position at End of Consult Bedside chair;Bed/Chair alarm activated;All needs within reach       The patient's AM-PAC Basic Mobility Inpatient Short Form Raw Score is 18. A Raw score of greater than 16 suggests the patient may benefit from discharge to home. Please also refer to the recommendation of the Physical Therapist for safe discharge planning.

## 2024-01-19 NOTE — PLAN OF CARE
Problem: OCCUPATIONAL THERAPY ADULT  Goal: Performs self-care activities at highest level of function for planned discharge setting.  See evaluation for individualized goals.  Description: Treatment Interventions: ADL retraining, Functional transfer training, UE strengthening/ROM, Endurance training, Patient/family training, Cognitive reorientation, Equipment evaluation/education, Compensatory technique education, Continued evaluation, Energy conservation, Activityengagement          See flowsheet documentation for full assessment, interventions and recommendations.   1/19/2024 1346 by Denisse Wetzel OT  Note: Limitation: Decreased ADL status, Decreased UE ROM, Decreased UE strength, Decreased endurance, Decreased cognition, Decreased Safe judgement during ADL, Decreased self-care trans, Decreased high-level ADLs  Prognosis: Fair  Assessment: Pt is a 63 y.o. male admitted to [facility] on 1/18/2024 w/ Nonhealing ulcer of heel (HCC). No acute surgical intervention per vascular/podiatry.  has a past medical history of CVA, Chronic kidney disease, Coronary artery disease, Diabetes mellitus, Dialysis patient, Fistula, GERD, Hypercholesteremia, Hyperlipidemia, Hypertension, Infectious viral hepatitis, Neuropathy, Obesity, Osteomyelitis, PVC's, Stroke, and TIA. Pt with active OT orders and up and OOB as tolerated orders. Pt seen as a co-evaluation with PT due to the patient's co-morbidities, clinically unstable presentation/clinical complexity, and present impairments. As per report, pta, resides with his wife, son, and dtr in a 2STH, 0STE. Pt was primarily I w/  ADLS and family A with IADLS, (-) drove. Upon evaluation, pt currently requires MIN A with RW for transfers and mobility. Pt currently requires S eating, S grooming, S UB ADLs, MOD A LB ADLs, and MIN A toileting. Pt is limited at this time 2*: pain, endurance, activity tolerance, functional mobility, balance, functional standing tolerance, decreased I w/ ADLS/IADLS,  strength, cognitive impairments, and decreased safety awareness.The following Occupational Performance Areas to address include: eating, grooming, bathing/shower, toilet hygiene, dressing, functional mobility, community mobility, and clothing management. Pt to benefit from immediate acute skilled OT to address above deficits, improve overall functional independence, maximize fnxl mobility and reduce caregiver burden. From OT standpoint, recommendation at time of d/c would be home with skilled therapy - wife reports being comfortable taking pt home at current level, pt has 24/7 supervision.  Pt was left after session with alarm on and all current needs met. The patient's raw score on the AM-PAC Daily Activity Inpatient Short Form is 16. A raw score of less than 19 suggests the patient may benefit from discharge to post-acute rehabilitation services. Please refer to the recommendation of the Occupational Therapist for safe discharge planning.     Rehab Resource Intensity Level, OT: III (Minimum Resource Intensity)       1/19/2024 1346 by Denisse Wetzel OT  Note: Limitation: Decreased ADL status, Decreased UE ROM, Decreased UE strength, Decreased endurance, Decreased cognition, Decreased Safe judgement during ADL, Decreased self-care trans, Decreased high-level ADLs  Prognosis: Fair  Assessment: Pt is a 63 y.o. male admitted to [facility] on 1/18/2024 w/ Nonhealing ulcer of heel (HCC). No acute surgical intervention per vascular/podiatry.  has a past medical history of CVA, Chronic kidney disease, Coronary artery disease, Diabetes mellitus, Dialysis patient, Fistula, GERD, Hypercholesteremia, Hyperlipidemia, Hypertension, Infectious viral hepatitis, Neuropathy, Obesity, Osteomyelitis, PVC's, Stroke, and TIA. Pt with active OT orders and up and OOB as tolerated orders. Pt seen as a co-evaluation with PT due to the patient's co-morbidities, clinically unstable presentation/clinical complexity, and present impairments. As per  report, pta, resides with his wife, son, and dtr in a 2STH, 0STE. Pt was primarily I w/  ADLS and family A with IADLS, (-) drove. Upon evaluation, pt currently requires MIN A with RW for transfers and mobility. Pt currently requires S eating, S grooming, S UB ADLs, MOD A LB ADLs, and MIN A toileting. Pt is limited at this time 2*: pain, endurance, activity tolerance, functional mobility, balance, functional standing tolerance, decreased I w/ ADLS/IADLS, strength, cognitive impairments, and decreased safety awareness.The following Occupational Performance Areas to address include: eating, grooming, bathing/shower, toilet hygiene, dressing, functional mobility, community mobility, and clothing management. Pt to benefit from immediate acute skilled OT to address above deficits, improve overall functional independence, maximize fnxl mobility and reduce caregiver burden. From OT standpoint, recommendation at time of d/c would be home with skilled therapy - wife reports being comfortable taking pt home at current level, pt has 24/7 supervision.  Pt was left after session with alarm on and all current needs met. The patient's raw score on the AM-PAC Daily Activity Inpatient Short Form is 16. A raw score of less than 19 suggests the patient may benefit from discharge to post-acute rehabilitation services. Please refer to the recommendation of the Occupational Therapist for safe discharge planning.     Rehab Resource Intensity Level, OT: III (Minimum Resource Intensity)

## 2024-01-19 NOTE — ASSESSMENT & PLAN NOTE
Per nursing staff patient slid out of wheelchair when attempting transport down to dialysis today, 1/19. This was witnessed by both transport staff and wife. Pt did not have head strike, no significant fall, slid to the floor. He was able to get back into bed.   Pt without any upper extremity pain, no evidence of significant bruising, full ROM of the lower extremities bilaterally. Only complaint of pain in the left heel where wound is noted   No indication for any imaging currently  PT/OT consultations  Per review wife concerned that she is unable to take care of him any longer at home, CM consult   Waist belt for safety as patient continuously trying to get out of bed, unable to be redirected

## 2024-01-19 NOTE — CASE MANAGEMENT
Case Management Assessment & Discharge Planning Note    Patient name Asad Galloway  Location [id number] MRN [id number]  : 1960 Date 2024       Current Admission Date: 2024  Current Admission Diagnosis:Nonhealing ulcer of heel ([facility])   Patient Active Problem List    Diagnosis Date Noted    Fall 2024    PAD (peripheral artery disease) ([facility]) 2024    Acute on chronic systolic (congestive) heart failure ([facility]) 2024    Hypotension 2023    Edema of left lower extremity 2023    Rectal bleeding 2023    Nonhealing ulcer of heel ([facility]) 2023    Elevated troponin 2023    Presence of external cardiac defibrillator 2023    Diabetic polyneuropathy associated with type 2 diabetes mellitus ([facility]) 2023    Absence of toe of right foot ([facility]) 2023    Hammer toes of both feet 2023    Type 1 non-ST elevation myocardial infarction (NSTEMI) s/p ADE to in-stent restenosis of mid LAD 2023    Ischemic cardiomyopathy 2023    Moderate AS (aortic stenosis) 2023    Mood disorder ([facility]) 2023    SOB (shortness of breath) 10/21/2022    Left arm swelling 10/21/2022    CAD, multiple vessel 2022    Electrolyte abnormality 2022    Chest pain 2022    Generalized weakness 2022    Primary hypertension 2022    Penetrating foot wound, left, initial encounter 2022    Emotional lability 2022    History of 2019 novel coronavirus disease (COVID-19) 2020    ESRD on dialysis ([facility]) 2020    Anemia 10/05/2020    S/P arteriovenous (AV) fistula creation 2020    Pre-kidney transplant, listed 2020    Urge incontinence 2019    Anxiety associated with depression 2019    History of stroke 10/04/2018    Abnormal EEG 2018    Other constipation 2018    GERD (gastroesophageal reflux disease) 2018    Elevated alkaline phosphatase level 2018    Nephrotic range  proteinuria 03/28/2018    Type 2 diabetes mellitus with chronic kidney disease on chronic dialysis, without long-term current use of insulin (HCC) 02/26/2016    Dizziness 02/26/2016    Mixed hyperlipidemia 02/26/2016    Antiplatelet or antithrombotic long-term use 02/26/2016      LOS (days): 1  Geometric Mean LOS (GMLOS) (days): 3  Days to GMLOS:2.2     OBJECTIVE:    Risk of Unplanned Readmission Score: 30.77         Current admission status: Inpatient       Preferred Pharmacy:   [facility]/pharm[facility] #[id number] - [place] - [address]  [address] [place] [address]  Phone: [phone] Fax: [phone]    Primary Care Provider: Raj Auguste DO    Primary Insurance: MEDICARE  Secondary Insurance: [email or web address]    ASSESSMENT:  Active Health Care Proxies    There are no active Health Care Proxies on file.                 Readmission Root Cause  30 Day Readmission: No    Patient Information  Admitted from:: Home  Mental Status: Confused  During Assessment patient was accompanied by: Spouse  Assessment information provided by:: Spouse  Primary Caregiver: Family  Caregiver's Name:: Juaquin's spouse  Caregiver's Relationship to Patient:: Family Member  Caregiver's Telephone Number:: [phone]  Support Systems: Spouse/significant other  County of Residence: [place]  What city do you live in?: [place]  Home entry access options. Select all that apply.: Stairs  Number of steps to enter home.: 2  Do the steps have railings?: Yes  Type of Current Residence: Bi-level  Upon entering residence, is there a bedroom on the main floor (no further steps)?: No  A bedroom is located on the following floor levels of residence (select all that apply):: 2nd Floor  Upon entering residence, is there a bathroom on the main floor (no further steps)?: No  Indicate which floors of current residence have a bathroom (select all the apply):: 2nd Floor  Number of steps to 2nd floor from main floor: 4  Living Arrangements:  Lives w/ Spouse/significant other  Is patient a ?: No    Activities of Daily Living Prior to Admission  Functional Status: Assistance  Completes ADLs independently?: No  Level of ADL dependence: Assistance  Ambulates independently?: Yes  Does patient use assisted devices?: No  Does patient currently own DME?: Yes  What DME does the patient currently own?: Walker, Straight Cane  Does patient have a history of Outpatient Therapy (PT/OT)?: Yes  Does the patient have a history of Short-Term Rehab?: No  Does patient have a history of HHC?: Yes  Does patient currently have HHC?: No         Patient Information Continued  Income Source: SSI/SSD  Does patient have prescription coverage?: Yes  Does patient receive dialysis treatments?: Yes (HD at [facility] M,W,F 12-5pm)  Does patient have a history of substance abuse?: No  Does patient have a history of Mental Health Diagnosis?: No         Means of Transportation  Means of Transport to Appts:: Family transport      Housing Stability: Low Risk  (1/19/2024)    Housing Stability Vital Sign     Unable to Pay for Housing in the Last Year: No     Number of Places Lived in the Last Year: 1     Unstable Housing in the Last Year: No   Food Insecurity: No Food Insecurity (1/19/2024)    Hunger Vital Sign     Worried About Running Out of Food in the Last Year: Never true     Ran Out of Food in the Last Year: Never true   Transportation Needs: No Transportation Needs (1/19/2024)    PRAPARE - Transportation     Lack of Transportation (Medical): No     Lack of Transportation (Non-Medical): No   Utilities: Not At Risk (1/19/2024)    AHC Utilities     Threatened with loss of utilities: No       DISCHARGE DETAILS:    Discharge planning discussed with:: Pt and pt's spouse at bedside.  Freedom of Choice: Yes  Comments - Freedom of Choice: PT/OT evals pending  CM contacted family/caregiver?: Yes  Were Treatment Team discharge recommendations reviewed with patient/caregiver?:  Yes  Did patient/caregiver verbalize understanding of patient care needs?: Yes  Were patient/caregiver advised of the risks associated with not following Treatment Team discharge recommendations?: Yes    Contacts  Patient Contacts: Criselda  Relationship to Patient:: Family  Contact Method: In Person  Reason/Outcome: Continuity of Care, Emergency Contact, Discharge Planning    Requested Home Health Care         Is the patient interested in HHC at discharge?: No    DME Referral Provided  Referral made for DME?: No    Other Referral/Resources/Interventions Provided:  Referral Comments: No referrals made at this time. PT/OT evals pending.    Additional Comments: CM met with pt and pt's spouse at bedside to discuss discharge planning. CM was informed that pt's spouse was stressed with caring for pt. CM discussed possibility of STR for pt and pt's spouse was very hesitant and stated she would like to speak to her children before deciding if she wants pt to go to STR. PT/OT evals still pending at this time. Pt resides at home with his wife in a bi-level home. Pt resides on the bottom floor with 4 steps to get to bottom level. Pt's spouse reports pt needs assistance with ADL's. Pt's spouse reports they own a walker and cane at home, but pt does not use them. Pt's spouse reports pt has anxiety diagnosis, but has not been hospitalized for his MH. Pt's spouse denied any D&A hx. CM department to continue to follow for discharge planning needs throughout this admission.

## 2024-01-19 NOTE — ASSESSMENT & PLAN NOTE
Most recent EF 40%.  Euvolemic on exam.  Continue Toprol XL 50 mg BID  Holding Entresto until vascular surgery plans as patient may require angiography.  Volume management via dialysis

## 2024-01-19 NOTE — PLAN OF CARE
3 hrs of HD today on a 2 k bath for a serum k of 5.3 today to UF for 2 kg as jose miguel    Post-Dialysis RN Treatment Note    Blood Pressure:  Pre 108/64 mm/Hg  Post 133/83 mmHg   EDW  TBD kg    Weight:  Pre 98.7 kg   Post 96.4 kg   Mode of weight measurement: Bed Scale   Volume Removed  1250 ml    Treatment duration 180 minutes    NS given  No    Treatment shortened? No   Medications given during Rx midodrine 10mg , tyleno 650 mg   Estimated Kt/V  .84   Access type: AV fistula   Access Issues: No    Report called to primary nurse   Yes   Ascencion Smith RN  Problem: METABOLIC, FLUID AND ELECTROLYTES - ADULT  Goal: Electrolytes maintained within normal limits  Description: INTERVENTIONS:  - Monitor labs and assess patient for signs and symptoms of electrolyte imbalances  - Administer electrolyte replacement as ordered  - Monitor response to electrolyte replacements, including repeat lab results as appropriate  - Instruct patient on fluid and nutrition as appropriate  Outcome: Progressing  Goal: Fluid balance maintained  Description: INTERVENTIONS:  - Monitor labs   - Monitor I/O and WT  - Instruct patient on fluid and nutrition as appropriate  - Assess for signs & symptoms of volume excess or deficit  Outcome: Progressing  Goal: Glucose maintained within target range  Description: INTERVENTIONS:  - Monitor Blood Glucose as ordered  - Assess for signs and symptoms of hyperglycemia and hypoglycemia  - Administer ordered medications to maintain glucose within target range  - Assess nutritional intake and initiate nutrition service referral as needed  Outcome: Progressing

## 2024-01-19 NOTE — RESTORATIVE TECHNICIAN NOTE
"       Restorative Technician Note      Patient Name: Asad Galloway     Restorative Tech Visit Date: 01/19/24  Note Type: Bracing, Initial consult  Patient Position Upon Consult: Supine  Brace Applied: Globoped Shoe (Heel Unloading) (Left, Large)  Patient Position When Brace Applied: Supine  Education Provided: Yes  Patient Position at End of Consult: Supine  Nurse Communication: Nurse aware of consult, application of brace    Please contact Mobility Coordinator on Tiger text \"SLB-PT-Restorative Tech\" role in regards to bracing instruction and/or adjustment.    ZEV Almendarez            "

## 2024-01-19 NOTE — PLAN OF CARE
Problem: PAIN - ADULT  Goal: Verbalizes/displays adequate comfort level or baseline comfort level  Description: Interventions:  - Encourage patient to monitor pain and request assistance  - Assess pain using appropriate pain scale  - Administer analgesics based on type and severity of pain and evaluate response  - Implement non-pharmacological measures as appropriate and evaluate response  - Consider cultural and social influences on pain and pain management  - Notify physician/advanced practitioner if interventions unsuccessful or patient reports new pain  Outcome: Progressing     Problem: INFECTION - ADULT  Goal: Absence or prevention of progression during hospitalization  Description: INTERVENTIONS:  - Assess and monitor for signs and symptoms of infection  - Monitor lab/diagnostic results  - Monitor all insertion sites, i.e. indwelling lines, tubes, and drains  - Monitor endotracheal if appropriate and nasal secretions for changes in amount and color  - [place] appropriate cooling/warming therapies per order  - Administer medications as ordered  - Instruct and encourage patient and family to use good hand hygiene technique  - Identify and instruct in appropriate isolation precautions for identified infection/condition  Outcome: Progressing     Problem: Knowledge Deficit  Goal: Patient/family/caregiver demonstrates understanding of disease process, treatment plan, medications, and discharge instructions  Description: Complete learning assessment and assess knowledge base.  Interventions:  - Provide teaching at level of understanding  - Provide teaching via preferred learning methods  Outcome: Progressing

## 2024-01-19 NOTE — NURSING NOTE
"Patient's bed alarm went off and patient was screaming  that he was \"dizzy\" and that he \"can't stand it anymore\" while hitting himself in the head. He was redirected to rest in bed. Per patient wife, he frequently does this through the night.  "

## 2024-01-19 NOTE — PLAN OF CARE
Problem: PAIN - ADULT  Goal: Verbalizes/displays adequate comfort level or baseline comfort level  Description: Interventions:  - Encourage patient to monitor pain and request assistance  - Assess pain using appropriate pain scale  - Administer analgesics based on type and severity of pain and evaluate response  - Implement non-pharmacological measures as appropriate and evaluate response  - Consider cultural and social influences on pain and pain management  - Notify physician/advanced practitioner if interventions unsuccessful or patient reports new pain  1/19/2024 0730 by Val Husain RN  Outcome: Progressing  1/19/2024 0730 by Val Husain RN  Outcome: Progressing     Problem: INFECTION - ADULT  Goal: Absence or prevention of progression during hospitalization  Description: INTERVENTIONS:  - Assess and monitor for signs and symptoms of infection  - Monitor lab/diagnostic results  - Monitor all insertion sites, i.e. indwelling lines, tubes, and drains  - Monitor endotracheal if appropriate and nasal secretions for changes in amount and color  - [place] appropriate cooling/warming therapies per order  - Administer medications as ordered  - Instruct and encourage patient and family to use good hand hygiene technique  - Identify and instruct in appropriate isolation precautions for identified infection/condition  1/19/2024 0730 by Val Husain RN  Outcome: Progressing  1/19/2024 0730 by Val Husain RN  Outcome: Progressing     Problem: DISCHARGE PLANNING  Goal: Discharge to home or other facility with appropriate resources  Description: INTERVENTIONS:  - Identify barriers to discharge w/patient and caregiver  - Arrange for needed discharge resources and transportation as appropriate  - Identify discharge learning needs (meds, wound care, etc.)  - Arrange for interpretive services to assist at discharge as needed  - Refer to Case Management Department for coordinating discharge planning  if the patient needs post-hospital services based on physician/advanced practitioner order or complex needs related to functional status, cognitive ability, or social support system  1/19/2024 0730 by Val Husain RN  Outcome: Progressing  1/19/2024 0730 by Val Husain RN  Outcome: Progressing     Problem: Knowledge Deficit  Goal: Patient/family/caregiver demonstrates understanding of disease process, treatment plan, medications, and discharge instructions  Description: Complete learning assessment and assess knowledge base.  Interventions:  - Provide teaching at level of understanding  - Provide teaching via preferred learning methods  1/19/2024 0730 by Val uHsain RN  Outcome: Progressing  1/19/2024 0730 by Val Husain RN  Outcome: Progressing     Problem: Prexisting or High Potential for Compromised Skin Integrity  Goal: Skin integrity is maintained or improved  Description: INTERVENTIONS:  - Identify patients at risk for skin breakdown  - Assess and monitor skin integrity  - Assess and monitor nutrition and hydration status  - Monitor labs   - Assess for incontinence   - Turn and reposition patient  - Assist with mobility/ambulation  - Relieve pressure over bony prominences  - Avoid friction and shearing  - Provide appropriate hygiene as needed including keeping skin clean and dry  - Evaluate need for skin moisturizer/barrier cream  - Collaborate with interdisciplinary team   - Patient/family teaching  - Consider wound care consult   1/19/2024 0730 by Val Husain RN  Outcome: Progressing     Problem: CARDIOVASCULAR - ADULT  Goal: Maintains optimal cardiac output and hemodynamic stability  Description: INTERVENTIONS:  - Monitor I/O, vital signs and rhythm  - Monitor for S/S and trends of decreased cardiac output  - Administer and titrate ordered vasoactive medications to optimize hemodynamic stability  - Assess quality of pulses, skin color and temperature  - Assess for signs of  decreased coronary artery perfusion  - Instruct patient to report change in severity of symptoms  1/19/2024 0730 by Val Husain RN  Outcome: Progressing  1/19/2024 0730 by Val Husain RN  Outcome: Progressing     Problem: CARDIOVASCULAR - ADULT  Goal: Absence of cardiac dysrhythmias or at baseline rhythm  Description: INTERVENTIONS:  - Continuous cardiac monitoring, vital signs, obtain 12 lead EKG if ordered  - Administer antiarrhythmic and heart rate control medications as ordered  - Monitor electrolytes and administer replacement therapy as ordered  1/19/2024 0730 by Val Husain RN  Outcome: Progressing  1/19/2024 0730 by Val Husain RN  Outcome: Progressing     Problem: METABOLIC, FLUID AND ELECTROLYTES - ADULT  Goal: Electrolytes maintained within normal limits  Description: INTERVENTIONS:  - Monitor labs and assess patient for signs and symptoms of electrolyte imbalances  - Administer electrolyte replacement as ordered  - Monitor response to electrolyte replacements, including repeat lab results as appropriate  - Instruct patient on fluid and nutrition as appropriate  1/19/2024 0730 by Val Husain RN  Outcome: Progressing  1/19/2024 0730 by Val Husain RN  Outcome: Progressing     Problem: METABOLIC, FLUID AND ELECTROLYTES - ADULT  Goal: Fluid balance maintained  Description: INTERVENTIONS:  - Monitor labs   - Monitor I/O and WT  - Instruct patient on fluid and nutrition as appropriate  - Assess for signs & symptoms of volume excess or deficit  1/19/2024 0730 by Val Husain RN  Outcome: Progressing  1/19/2024 0730 by Val Husain RN  Outcome: Progressing     Problem: SKIN/TISSUE INTEGRITY - ADULT  Goal: Incision(s), wounds(s) or drain site(s) healing without S/S of infection  Description: INTERVENTIONS  - Assess and document dressing, incision, wound bed, drain sites and surrounding tissue  - Provide patient and family education    1/19/2024 0730 by Val  JULISA Husain RN  Outcome: Progressing  1/19/2024 0730 by Val Husain RN  Outcome: Progressing     Problem: MUSCULOSKELETAL - ADULT  Goal: Maintain or return mobility to safest level of function  Description: INTERVENTIONS:  - Assess patient's ability to carry out ADLs; assess patient's baseline for ADL function and identify physical deficits which impact ability to perform ADLs (bathing, care of mouth/teeth, toileting, grooming, dressing, etc.)  - Assess/evaluate cause of self-care deficits   - Assess range of motion  - Assess patient's mobility  - Assess patient's need for assistive devices and provide as appropriate  - Encourage maximum independence but intervene and supervise when necessary  - Involve family in performance of ADLs  - Assess for home care needs following discharge   - Consider OT consult to assist with ADL evaluation and planning for discharge  - Provide patient education as appropriate  1/19/2024 0730 by Val Husain RN  Outcome: Progressing  1/19/2024 0730 by Val Husain RN  Outcome: Progressing     Problem: MUSCULOSKELETAL - ADULT  Goal: Maintain proper alignment of affected body part  Description: INTERVENTIONS:  - Support, maintain and protect limb and body alignment  - Provide patient/ family with appropriate education  1/19/2024 0730 by Val Husain RN  Outcome: Progressing  1/19/2024 0730 by Val Husain RN  Outcome: Progressing

## 2024-01-19 NOTE — PROGRESS NOTES
Pt has been non stop attempting to get oob; pt unsafe/impulsive; luana chaudhary pac aware; orders rec'd for posey belt

## 2024-01-19 NOTE — PROGRESS NOTES
Notified by gino novak, pt fell getting into the wheelchair from his chair; pt was being assisted by transport and slid down; pt did not have socks on; pt did not hit his head; no redness/bruising noted; no change in neuro assessment from this am; pt alert/oriented x2; chair alarm was on; pt assisted back to bed by staff; bp 132/64, apical 64; pt denies any pain/dizziness/lightheadedness; luana chaudhary pac aware; up to see pt

## 2024-01-19 NOTE — ASSESSMENT & PLAN NOTE
Stable.  Continue aspirin, prasugrel, metoprolol and statin.  Pt was complaining of some chest pain at rest on 1/19, EKG obtained without change, symptoms resolved, continue to monitor

## 2024-01-19 NOTE — ASSESSMENT & PLAN NOTE
Pt presented with chronic nonhealing ulcer of the left heel, positive probe to bone concerning for clinical osteomyelitis. Sent in from podiatry office outpatient for direct admission   Patient afebrile, no leukocytosis and hemodynamically stable  Vascular surgery following,  S/p LEADS with diffuse disease but no evidence of significant stenosis or occlusion, toe pressures below healing range  No plans for surgical intervention at this time, consideration for possible angiography?  Podiatry following,  Plan for possible debridement   WBAT left foot with surgical shoe  Continue renally adjusted dose cefepime.  Follow wound cultures.  Consider ID consult pending clinical course.

## 2024-01-19 NOTE — PLAN OF CARE
Problem: Nutrition/Hydration-ADULT  Goal: Nutrient/Hydration intake appropriate for improving, restoring or maintaining nutritional needs  Description: Monitor and assess patient's nutrition/hydration status for malnutrition. Collaborate with interdisciplinary team and initiate plan and interventions as ordered.  Monitor patient's weight and dietary intake as ordered or per policy. Utilize nutrition screening tool and intervene as necessary. Determine patient's food preferences and provide high-protein, high-caloric foods as appropriate.     INTERVENTIONS:  - Monitor oral intake, urinary output, labs, and treatment plans  - Assess nutrition and hydration status and recommend course of action  - Evaluate amount of meals eaten  - Assist patient with eating if necessary   - Allow adequate time for meals  - Recommend/ encourage appropriate diets, oral nutritional supplements, and vitamin/mineral supplements  - Order, calculate, and assess calorie counts as needed  - Recommend, monitor, and adjust tube feedings and TPN/PPN based on assessed needs  - Assess need for intravenous fluids  - Provide specific nutrition/hydration education as appropriate  - Include patient/family/caregiver in decisions related to nutrition  Outcome: Progressing     Problem: SAFETY,RESTRAINT: NV/NON-SELF DESTRUCTIVE BEHAVIOR  Goal: Remains free of harm/injury (restraint for non violent/non self-detsructive behavior)  Description: INTERVENTIONS:  - Instruct patient/family regarding restraint use   - Assess and monitor physiologic and psychological status   - Provide interventions and comfort measures to meet assessed patient needs   - Identify and implement measures to help patient regain control  - Assess readiness for release of restraint   Outcome: Progressing     Problem: SAFETY,RESTRAINT: NV/NON-SELF DESTRUCTIVE BEHAVIOR  Goal: Returns to optimal restraint-free functioning  Description: INTERVENTIONS:  - Assess the patient's behavior and  symptoms that indicate continued need for restraint  - Identify and implement measures to help patient regain control  - Assess readiness for release of restraint   Outcome: Progressing

## 2024-01-19 NOTE — ASSESSMENT & PLAN NOTE
Peripheral artery disease status post LLE a gram with balloon angioplasty of PT and AT 9/2023.  Presented with nonhealing left heel ulcer as above.  Appreciate vascular surgery and podiatry recommendations as above  Continue aspirin and statin.

## 2024-01-20 LAB
ANION GAP SERPL CALCULATED.3IONS-SCNC: 13 MMOL/L
BACTERIA SPEC ANAEROBE CULT: ABNORMAL
BUN SERPL-MCNC: 42 MG/DL (ref 5–25)
CALCIUM SERPL-MCNC: 7.8 MG/DL (ref 8.4–10.2)
CHLORIDE SERPL-SCNC: 97 MMOL/L (ref 96–108)
CO2 SERPL-SCNC: 23 MMOL/L (ref 21–32)
CREAT SERPL-MCNC: 6.28 MG/DL (ref 0.6–1.3)
ERYTHROCYTE [DISTWIDTH] IN BLOOD BY AUTOMATED COUNT: 15.7 % (ref 11.6–15.1)
GFR SERPL CREATININE-BSD FRML MDRD: 8 ML/MIN/1.73SQ M
GLUCOSE SERPL-MCNC: 100 MG/DL (ref 65–140)
GLUCOSE SERPL-MCNC: 123 MG/DL (ref 65–140)
GLUCOSE SERPL-MCNC: 145 MG/DL (ref 65–140)
GLUCOSE SERPL-MCNC: 174 MG/DL (ref 65–140)
GLUCOSE SERPL-MCNC: 90 MG/DL (ref 65–140)
HCT VFR BLD AUTO: 30.8 % (ref 36.5–49.3)
HGB BLD-MCNC: 9.5 G/DL (ref 12–17)
MCH RBC QN AUTO: 31.4 PG (ref 26.8–34.3)
MCHC RBC AUTO-ENTMCNC: 30.8 G/DL (ref 31.4–37.4)
MCV RBC AUTO: 102 FL (ref 82–98)
PLATELET # BLD AUTO: 99 THOUSANDS/UL (ref 149–390)
PMV BLD AUTO: 12.1 FL (ref 8.9–12.7)
POTASSIUM SERPL-SCNC: 5.3 MMOL/L (ref 3.5–5.3)
RBC # BLD AUTO: 3.03 MILLION/UL (ref 3.88–5.62)
SODIUM SERPL-SCNC: 133 MMOL/L (ref 135–147)
WBC # BLD AUTO: 4.46 THOUSAND/UL (ref 4.31–10.16)

## 2024-01-20 PROCEDURE — 80048 BASIC METABOLIC PNL TOTAL CA: CPT | Performed by: PHYSICIAN ASSISTANT

## 2024-01-20 PROCEDURE — 99232 SBSQ HOSP IP/OBS MODERATE 35: CPT | Performed by: INTERNAL MEDICINE

## 2024-01-20 PROCEDURE — 85027 COMPLETE CBC AUTOMATED: CPT | Performed by: PHYSICIAN ASSISTANT

## 2024-01-20 PROCEDURE — 82948 REAGENT STRIP/BLOOD GLUCOSE: CPT

## 2024-01-20 PROCEDURE — 99232 SBSQ HOSP IP/OBS MODERATE 35: CPT | Performed by: NURSE PRACTITIONER

## 2024-01-20 RX ADMIN — HEPARIN SODIUM 5000 UNITS: 5000 INJECTION INTRAVENOUS; SUBCUTANEOUS at 13:30

## 2024-01-20 RX ADMIN — ATORVASTATIN CALCIUM 40 MG: 40 TABLET, FILM COATED ORAL at 17:53

## 2024-01-20 RX ADMIN — Medication 2.5 MG: at 10:06

## 2024-01-20 RX ADMIN — DAKIN'S SOLUTION 0.125% (QUARTER STRENGTH): 0.12 SOLUTION at 12:17

## 2024-01-20 RX ADMIN — DIVALPROEX SODIUM 250 MG: 125 CAPSULE, COATED PELLETS ORAL at 21:33

## 2024-01-20 RX ADMIN — ASPIRIN 81 MG: 81 TABLET, COATED ORAL at 08:41

## 2024-01-20 RX ADMIN — DIVALPROEX SODIUM 250 MG: 125 CAPSULE, COATED PELLETS ORAL at 08:41

## 2024-01-20 RX ADMIN — OXYCODONE HYDROCHLORIDE 5 MG: 5 TABLET ORAL at 17:52

## 2024-01-20 RX ADMIN — METOPROLOL SUCCINATE 50 MG: 50 TABLET, EXTENDED RELEASE ORAL at 21:33

## 2024-01-20 RX ADMIN — OXYCODONE HYDROCHLORIDE 5 MG: 5 TABLET ORAL at 22:58

## 2024-01-20 RX ADMIN — PRASUGREL 5 MG: 10 TABLET, FILM COATED ORAL at 08:41

## 2024-01-20 RX ADMIN — ACETAMINOPHEN 650 MG: 325 TABLET, FILM COATED ORAL at 21:33

## 2024-01-20 RX ADMIN — HEPARIN SODIUM 5000 UNITS: 5000 INJECTION INTRAVENOUS; SUBCUTANEOUS at 21:34

## 2024-01-20 RX ADMIN — HEPARIN SODIUM 5000 UNITS: 5000 INJECTION INTRAVENOUS; SUBCUTANEOUS at 05:22

## 2024-01-20 RX ADMIN — CEFEPIME 1000 MG: 1 INJECTION, POWDER, FOR SOLUTION INTRAMUSCULAR; INTRAVENOUS at 14:42

## 2024-01-20 NOTE — ASSESSMENT & PLAN NOTE
Lab Results   Component Value Date    EGFR 8 01/20/2024    EGFR 6 01/19/2024    EGFR 9 01/18/2024    CREATININE 6.28 (H) 01/20/2024    CREATININE 7.59 (H) 01/19/2024    CREATININE 5.76 (H) 01/18/2024     On hemodialysis, MWF schedule  Nephrology following,  Continue  HD    Monitor labs

## 2024-01-20 NOTE — PROGRESS NOTES
[facility]  Progress Note  Name: Asad Galloway I  MRN: [id number]  Unit/Bed#: NW8 874-01 I Date of Admission: 1/18/2024   Date of Service: 1/20/2024 I Hospital Day: 2    Assessment/Plan   * Nonhealing ulcer of heel (HCC)  Assessment & Plan  Pt presented with chronic nonhealing ulcer of the left heel, positive probe to bone concerning for clinical osteomyelitis. Sent in from podiatry office outpatient for direct admission   Patient afebrile, no leukocytosis and hemodynamically stable  Vascular surgery following,  S/p LEADS with diffuse disease but no evidence of significant stenosis or occlusion, toe pressures below healing range  No plans for surgical intervention at this time, consideration for angiography, son refusing at this time based on prior conversation he states he has had with Dr Galvan and Dr Church   Angiogram continued in orders pending final conversation with vascular and podiatry   Podiatry following,  Plan for possible debridement   WBAT left foot with surgical shoe  Plan for MRI ordered   Continue renally adjusted dose cefepime.   Will need to transition based on sensitivities likely available in next 24 hrs   Follow wound cultures, pseudomonas and klebsiella   Consider ID consult pending clinical course, will wait for sensitivities     Fall  Assessment & Plan  Per nursing staff patient slid out of wheelchair when attempting transport down to dialysis, 1/19. This was witnessed by both transport staff and wife. Pt did not have head strike, no significant fall, slid to the floor. He was able to get back into bed.   Pt without any upper extremity pain, no evidence of significant bruising, full ROM of the lower extremities bilaterally. Only complaint of pain in the left heel where wound is noted   No indication for any imaging currently  PT/OT consultations  Per review wife concerned that she is unable to take care of him any longer at home, CM consult    However,  today pts wife wanting to know when he can go home   Waist belt for safety as patient continuously trying to get out of bed, unable to be redirected     ESRD on dialysis ([facility])  Assessment & Plan  Lab Results   Component Value Date    EGFR 8 01/20/2024    EGFR 6 01/19/2024    EGFR 9 01/18/2024    CREATININE 6.28 (H) 01/20/2024    CREATININE 7.59 (H) 01/19/2024    CREATININE 5.76 (H) 01/18/2024     On hemodialysis, MWF schedule  Nephrology following,  Continue  HD    Monitor labs    PAD (peripheral artery disease) ([facility])  Assessment & Plan  Peripheral artery disease status post LLE a gram with balloon angioplasty of PT and AT 9/2023.  Presented with nonhealing left heel ulcer as above, from   Appreciate vascular surgery and podiatry recommendations as above   However on discussion with Pts son , he reports that he has had very extensive discussion with Dr Galvan and Dr Church . I have reached out to vascular and podiatry today . They will see pt and talk with the son tomorrow Sunday .   Continue aspirin and statin.    Hypotension  Assessment & Plan  BP overall fairly stable  Continue midodrine 2.5 mg PRN with dialysis.    Mood disorder (HCC)  Assessment & Plan  Continue home Depakote 250 mg BID    Ischemic cardiomyopathy  Assessment & Plan  Most recent EF 40%.  Euvolemic on exam.  Continue Toprol XL 50 mg BID  Holding Entresto until vascular surgery plans as patient may require angiography, will resume tomorrow based on outcome of vascular and podiatry discussion with pts son   Volume management via dialysis    CAD, multiple vessel  Assessment & Plan  Stable.  Continue aspirin, prasugrel, metoprolol and statin.  Pt was complaining of some chest pain at rest on 1/19, EKG obtained without change, symptoms resolved, continue to monitor     Anemia  Assessment & Plan  In the setting of end-stage renal disease.  Hemoglobin stable.  BAYLEE per nephrology.    Mixed hyperlipidemia  Assessment & Plan  Continue home atorvastatin 40 mg  daily.    Type 2 diabetes mellitus with chronic kidney disease on chronic dialysis, without long-term current use of insulin ([facility])  Assessment & Plan  Lab Results   Component Value Date    HGBA1C 5.1 01/10/2024       Recent Labs     01/19/24  1145 01/19/24  1723 01/20/24  0800 01/20/24  1125   POCGLU 116 96 90 174*         Blood Sugar Average: Last 72 hrs:  (P) [id number]  Diet controlled at baseline   Sliding scale insulin with meals here   QID glucose checks   Monitor and adjust regimen as needed             VTE Pharmacologic Prophylaxis: VTE Score: 4 High Risk (Score >/= 5) - Pharmacological DVT Prophylaxis Ordered: heparin. Sequential Compression Devices Ordered.    Mobility:   Basic Mobility Inpatient Raw Score: 18  JH-HLM Goal: 6: Walk 10 steps or more  JH-HLM Achieved: 1: Laying in bed  HLM Goal NOT achieved. Continue with multidisciplinary rounding and encourage appropriate mobility to improve upon HLM goals.    Patient Centered Rounds: I performed bedside rounds with nursing staff today.   Discussions with Specialists or Other Care Team Provider: nursing     Education and Discussions with Family / Patient: Updated  (wife) at bedside. And extensive discussion with the Son via phone     Total Time Spent on Date of Encounter in care of patient: 40 mins. This time was spent on one or more of the following: performing physical exam; counseling and coordination of care; obtaining or reviewing history; documenting in the medical record; reviewing/ordering tests, medications or procedures; communicating with other healthcare professionals and discussing with patient's family/caregivers.    Current Length of Stay: 2 day(s)  Current Patient Status: Inpatient   Certification Statement: The patient will continue to require additional inpatient hospital stay due to ongoing need for iv abx and pending agram tuesday  Discharge Plan: Anticipate discharge in >72 hrs to will need final recs when final  plan of care completed    Code Status: Level 1 - Full Code    Subjective:   Pt is pleasant and confused at baseline . He is with wife at the bedside who reports that she would like the vac dressing placed on his foot and no agram at this time. I had discussion with son on phone and pts wife at bedside. They report these decisions are based on prior conversation with Dr Galvan. I did let them know that these are all the same teams as those physicians and on same page but I will have them call the son .     Objective:     Vitals:   Temp (24hrs), Av.3 °F (36.8 °C), Min:97.9 °F (36.6 °C), Max:98.6 °F (37 °C)    Temp:  [97.9 °F (36.6 °C)-98.6 °F (37 °C)] 97.9 °F (36.6 °C)  HR:  [60-64] 64  Resp:  [14] 14  BP: (105-143)/(52-64) 143/62  SpO2:  [96 %-98 %] 98 %  Body mass index is 33.73 kg/m².     Input and Output Summary (last 24 hours):     Intake/Output Summary (Last 24 hours) at 2024 1550  Last data filed at 2024 1301  Gross per 24 hour   Intake 360 ml   Output --   Net 360 ml       Physical Exam:   Physical Exam  Constitutional:       General: He is not in acute distress.     Appearance: He is obese. He is not ill-appearing, toxic-appearing or diaphoretic.   HENT:      Head: Normocephalic and atraumatic.   Eyes:      General:         Right eye: No discharge.         Left eye: No discharge.   Cardiovascular:      Rate and Rhythm: Normal rate.      Heart sounds: No murmur heard.     No friction rub. No gallop.   Pulmonary:      Effort: No respiratory distress.      Breath sounds: Rales (left lower lobe) present. No wheezing.   Abdominal:      General: There is no distension.      Palpations: There is no mass.      Tenderness: There is no abdominal tenderness. There is no guarding or rebound.      Hernia: No hernia is present.   Musculoskeletal:         General: Tenderness (left foot /pain with dangle) present. No swelling, deformity or signs of injury.      Right lower leg: No edema.      Left lower leg: No  edema.   Skin:     Coloration: Skin is not jaundiced or pale.      Findings: No bruising, erythema, lesion or rash.      Comments: Left foot dressing in curlex wrapped over left foot    Neurological:      Mental Status: He is alert. Mental status is at baseline. He is disoriented.   Psychiatric:         Behavior: Behavior normal.          Additional Data:     Labs:  Results from last 7 days   Lab Units 01/20/24  0526 01/19/24  1246   WBC Thousand/uL 4.46 4.64   HEMOGLOBIN g/dL 9.5* 9.0*   HEMATOCRIT % 30.8* 29.0*   PLATELETS Thousands/uL 99* 102*   NEUTROS PCT %  --  64   LYMPHS PCT %  --  21   MONOS PCT %  --  12   EOS PCT %  --  2     Results from last 7 days   Lab Units 01/20/24  0526 01/19/24  1246 01/18/24  1455   SODIUM mmol/L 133*   < > 155*   POTASSIUM mmol/L 5.3   < > 5.3   CHLORIDE mmol/L 97   < > 108   CO2 mmol/L 23   < > 27   BUN mg/dL 42*   < > 46*   CREATININE mg/dL 6.28*   < > 5.76*   ANION GAP mmol/L 13   < > 20   CALCIUM mg/dL 7.8*   < > 8.1*   ALBUMIN g/dL  --   --  4.0   TOTAL BILIRUBIN mg/dL  --   --  0.51   ALK PHOS U/L  --   --  94   ALT U/L  --   --  7   AST U/L  --   --  10*   GLUCOSE RANDOM mg/dL 100   < > 183*    < > = values in this interval not displayed.         Results from last 7 days   Lab Units 01/20/24  1125 01/20/24  0800 01/19/24  1723 01/19/24  1145 01/19/24  0721 01/18/24  2109 01/18/24  1730   POC GLUCOSE mg/dl 174* 90 96 116 137 128 97               Lines/Drains:  Invasive Devices       Peripheral Intravenous Line  Duration             Peripheral IV 01/18/24 Proximal;Right;Ventral (anterior) Forearm 2 days              Line  Duration             Hemodialysis AV Fistula Left Upper arm -- days                          Imaging: Reviewed radiology reports from this admission including: xray(s)    Recent Cultures (last 7 days):   Results from last 7 days   Lab Units 01/18/24  1408   GRAM STAIN RESULT  No polys seen*  4+ Gram positive cocci in pairs*  4+ Gram negative rods*  2+  Gram positive cocci in clusters*   WOUND CULTURE  3+ Growth of Klebsiella-Enterobacter  group*  3+ Growth of Pseudomonas aeruginosa*  3+ Growth of       Last 24 Hours Medication List:   Current Facility-Administered Medications   Medication Dose Route Frequency Provider Last Rate    acetaminophen  650 mg Oral Q8H [facility] Speedy Quintero MD      aspirin  81 mg Oral Daily Speedy Quintero MD      atorvastatin  40 mg Oral Daily With Dinner Speedy Quintero MD      cefepime  1,000 mg Intravenous Q24H Speedy Quintero MD 1,000 mg (01/20/24 1442)    divalproex sodium  250 mg Oral Q12H [facility] Speedy Quintero MD      heparin (porcine)  5,000 Units Subcutaneous Q8H [facility] Speedy Quintero MD      insulin lispro  1-6 Units Subcutaneous TID  Speedy Quintero MD      LORazepam  0.5 mg Intravenous Once Mary H ALLISON Turner      LORazepam  0.5 mg Intravenous Once PRN Mary MUSTAPHA Turner PA-C      metoprolol succinate  50 mg Oral BID Speedy Quintero MD      midodrine  10 mg Oral Before Dialysis Joselyn Reyes Bahamonde, MD      ondansetron  4 mg Intravenous Q6H PRN Speedy Quintero MD      oxyCODONE  2.5 mg Oral TID PRN Speedy Quintero MD      Or    oxyCODONE  5 mg Oral TID PRN Speedy Quintero MD      prasugrel  5 mg Oral Daily Speedy Quintero MD      sodium hypochlorite   Irrigation Daily Denisse Betancourt DPM          Today, Patient Was Seen By: BRITTANY Rodriguez    **Please Note: This note may have been constructed using a voice recognition system.**

## 2024-01-20 NOTE — ASSESSMENT & PLAN NOTE
Pt presented with chronic nonhealing ulcer of the left heel, positive probe to bone concerning for clinical osteomyelitis. Sent in from podiatry office outpatient for direct admission   Patient afebrile, no leukocytosis and hemodynamically stable  Vascular surgery following,  S/p LEADS with diffuse disease but no evidence of significant stenosis or occlusion, toe pressures below healing range  No plans for surgical intervention at this time, consideration for angiography, son refusing at this time based on prior conversation he states he has had with Dr Galvan and Dr Church   Angiogram continued in orders pending final conversation with vascular and podiatry   Podiatry following,  Plan for possible debridement   WBAT left foot with surgical shoe  Plan for MRI ordered   Continue renally adjusted dose cefepime.   Will need to transition based on sensitivities likely available in next 24 hrs   Follow wound cultures, pseudomonas and klebsiella   Consider ID consult pending clinical course, will wait for sensitivities

## 2024-01-20 NOTE — ASSESSMENT & PLAN NOTE
Lab Results   Component Value Date    HGBA1C 5.1 01/10/2024       Recent Labs     01/19/24  1145 01/19/24  1723 01/20/24  0800 01/20/24  1125   POCGLU 116 96 90 174*         Blood Sugar Average: Last 72 hrs:  (P) [id number]  Diet controlled at baseline   Sliding scale insulin with meals here   QID glucose checks   Monitor and adjust regimen as needed

## 2024-01-20 NOTE — ASSESSMENT & PLAN NOTE
Per nursing staff patient slid out of wheelchair when attempting transport down to dialysis, 1/19. This was witnessed by both transport staff and wife. Pt did not have head strike, no significant fall, slid to the floor. He was able to get back into bed.   Pt without any upper extremity pain, no evidence of significant bruising, full ROM of the lower extremities bilaterally. Only complaint of pain in the left heel where wound is noted   No indication for any imaging currently  PT/OT consultations  Per review wife concerned that she is unable to take care of him any longer at home, CM consult    However, today pts wife wanting to know when he can go home   Waist belt for safety as patient continuously trying to get out of bed, unable to be redirected

## 2024-01-20 NOTE — PROGRESS NOTES
NEPHROLOGY PROGRESS NOTE   Asad Galloway 63 y.o. male MRN: [id number]  Unit/Bed#: NW8 874-01 Encounter: [id number]      ASSESSMENT & PLAN:  #[place] [facility] [place]  [place] dialysis treatment yesterday.  Next dialysis treatment on Monday if patient remains hospitalized.    2 nonhealing left heel ulcer sent for osteomyelitis, IR consulted for angiogram possible on Tuesday 1/23    3 hemodynamics, blood pressure is stable in the lower side known to have intradialytic hypotension    4 anemia of chronic kidney disease, no BAYLEE due to history of CVA, monitor H&H and fever hemoglobin less than 7    5 Access left upper extremity AV fistula    SUBJECTIVE:  Patient seen and examined, denies significant complaints, reports no issues with dialysis yesterday.  Patient's family member at bedside  Denies any chest pain, no shortness of breath, no nausea or vomiting    OBJECTIVE:  Current Weight: Weight - Scale: 94.8 kg (209 lb)  Vitals:    01/20/24 0839   BP: 108/64   Pulse: 63   Resp:    Temp:    SpO2:        Intake/Output Summary (Last 24 hours) at 1/20/2024 1242  Last data filed at 1/19/2024 1540  Gross per 24 hour   Intake 300 ml   Output 1750 ml   Net -1450 ml       General: conscious, cooperative, in not acute distress  Eyes: conjunctivae pale, anicteric sclerae  ENT: lips and mucous membranes moist  Neck: supple, no JVD  Chest: clear breath sounds bilateral, no crackles, ronchus or wheezings  CVS: distinct S1 & S2, normal rate, regular rhythm  Abdomen: soft, non-tender, non-distended, normoactive bowel sounds  Extremities: no significant edema of both legs  Skin: no rash, left foot covered with dressing  Neuro: awake, alert, oriented      Medications:    Current Facility-Administered Medications:     acetaminophen (TYLENOL) tablet 650 mg, 650 mg, Oral, Q8H [facility], Speedy Quintero MD, 650 mg at 01/19/24 2129    aspirin (ECOTRIN LOW STRENGTH) EC tablet 81 mg, 81 mg, Oral, Daily, Speedy Quintero MD, 81 mg at 01/20/24 0841    atorvastatin  (LIPITOR) tablet 40 mg, 40 mg, Oral, Daily With Dinner, Speedy Quintero MD, 40 mg at 01/19/24 1742    cefepime (MAXIPIME) 1,000 mg in dextrose 5 % 50 mL IVPB, 1,000 mg, Intravenous, Q24H, Speedy Quintero MD, Last Rate: 100 mL/hr at 01/19/24 1742, 1,000 mg at 01/19/24 1742    divalproex sodium (DEPAKOTE SPRINKLE) capsule 250 mg, 250 mg, Oral, Q12H [facility], Speedy Quintero MD, 250 mg at 01/20/24 0841    heparin (porcine) subcutaneous injection 5,000 Units, 5,000 Units, Subcutaneous, Q8H [facility], Speedy Quintero MD, 5,000 Units at 01/20/24 0522    insulin lispro (HumaLOG) 100 units/mL subcutaneous injection 1-6 Units, 1-6 Units, Subcutaneous, TID AC **AND** Fingerstick Glucose (POCT), , , TID AC, Speedy Quintero MD    LORazepam (ATIVAN) injection 0.5 mg, 0.5 mg, Intravenous, Once, Mary Turner PA-C    LORazepam (ATIVAN) injection 0.5 mg, 0.5 mg, Intravenous, Once PRN, Mary Turner PA-C    metoprolol succinate (TOPROL-XL) 24 hr tablet 50 mg, 50 mg, Oral, BID, Speedy Quintero MD, 50 mg at 01/19/24 2129    midodrine (PROAMATINE) tablet 10 mg, 10 mg, Oral, Before Dialysis, Joselyn Reyes Bahamonde, MD, 10 mg at 01/19/24 1305    ondansetron (ZOFRAN) injection 4 mg, 4 mg, Intravenous, Q6H PRN, Speedy Quintero MD, 4 mg at 01/19/24 0958    oxyCODONE (ROXICODONE) split tablet 2.5 mg, 2.5 mg, Oral, TID PRN, 2.5 mg at 01/20/24 1006 **OR** oxyCODONE (ROXICODONE) IR tablet 5 mg, 5 mg, Oral, TID PRN, Speedy Quintero MD, 5 mg at 01/19/24 0927    prasugrel (EFFIENT) tablet 5 mg, 5 mg, Oral, Daily, Speedy Quintero MD, 5 mg at 01/20/24 0841    sodium hypochlorite (DAKIN'S QUARTER-STRENGTH) 0.125 percent topical solution, , Irrigation, Daily, Denisse Betancourt DPM, Given at 01/20/24 1217    Invasive Devices:        Lab Results:   Results from last 7 days   Lab Units 01/20/24  0526 01/19/24  1246 01/18/24  1455   WBC Thousand/uL 4.46 4.64 4.38   HEMOGLOBIN g/dL 9.5* 9.0* 10.1*   HEMATOCRIT % 30.8* 29.0* 33.7*   PLATELETS Thousands/uL 99* 102* 99*   SODIUM mmol/L 133* 136  "155*   POTASSIUM mmol/L 5.3 5.5* 5.3   CHLORIDE mmol/L 97 97 108   CO2 mmol/L 23 27 27   BUN mg/dL 42* 63* 46*   CREATININE mg/dL 6.28* 7.59* 5.76*   CALCIUM mg/dL 7.8* 8.1* 8.1*   ALK PHOS U/L  --   --  94   ALT U/L  --   --  7   AST U/L  --   --  10*           Portions of the record may have been created with voice recognition software. Occasional wrong word or \"sound a like\" substitutions may have occurred due to the inherent limitations of voice recognition software. Read the chart carefully and recognize, using context, where substitutions have occurred.If you have any questions, please contact the dictating provider.  "

## 2024-01-20 NOTE — ASSESSMENT & PLAN NOTE
Peripheral artery disease status post LLE a gram with balloon angioplasty of PT and AT 9/2023.  Presented with nonhealing left heel ulcer as above, from   Appreciate vascular surgery and podiatry recommendations as above   However on discussion with Pts son , he reports that he has had very extensive discussion with Dr Galvan and Dr Church . I have reached out to vascular and podiatry today . They will see pt and talk with the son tomorrow Sunday .   Continue aspirin and statin.

## 2024-01-20 NOTE — ASSESSMENT & PLAN NOTE
Most recent EF 40%.  Euvolemic on exam.  Continue Toprol XL 50 mg BID  Holding Entresto until vascular surgery plans as patient may require angiography, will resume tomorrow based on outcome of vascular and podiatry discussion with pts son   Volume management via dialysis

## 2024-01-21 ENCOUNTER — HOME CARE VISIT (OUTPATIENT)
Dept: HOME HEALTH SERVICES | Facility: HOME HEALTHCARE | Age: 64
End: 2024-01-21
Payer: MEDICARE

## 2024-01-21 LAB
BACTERIA WND AEROBE CULT: ABNORMAL
GLUCOSE SERPL-MCNC: 120 MG/DL (ref 65–140)
GLUCOSE SERPL-MCNC: 132 MG/DL (ref 65–140)
GLUCOSE SERPL-MCNC: 145 MG/DL (ref 65–140)
GLUCOSE SERPL-MCNC: 89 MG/DL (ref 65–140)
GRAM STN SPEC: ABNORMAL

## 2024-01-21 PROCEDURE — NC001 PR NO CHARGE: Performed by: PODIATRIST

## 2024-01-21 PROCEDURE — 99232 SBSQ HOSP IP/OBS MODERATE 35: CPT | Performed by: NURSE PRACTITIONER

## 2024-01-21 PROCEDURE — 82948 REAGENT STRIP/BLOOD GLUCOSE: CPT

## 2024-01-21 PROCEDURE — 99232 SBSQ HOSP IP/OBS MODERATE 35: CPT | Performed by: INTERNAL MEDICINE

## 2024-01-21 PROCEDURE — 99232 SBSQ HOSP IP/OBS MODERATE 35: CPT | Performed by: SURGERY

## 2024-01-21 RX ADMIN — DIVALPROEX SODIUM 250 MG: 125 CAPSULE, COATED PELLETS ORAL at 20:53

## 2024-01-21 RX ADMIN — METOPROLOL SUCCINATE 50 MG: 50 TABLET, EXTENDED RELEASE ORAL at 09:13

## 2024-01-21 RX ADMIN — ACETAMINOPHEN 650 MG: 325 TABLET, FILM COATED ORAL at 20:52

## 2024-01-21 RX ADMIN — PRASUGREL 5 MG: 10 TABLET, FILM COATED ORAL at 09:21

## 2024-01-21 RX ADMIN — OXYCODONE HYDROCHLORIDE 5 MG: 5 TABLET ORAL at 14:31

## 2024-01-21 RX ADMIN — DIVALPROEX SODIUM 250 MG: 125 CAPSULE, COATED PELLETS ORAL at 09:13

## 2024-01-21 RX ADMIN — ATORVASTATIN CALCIUM 40 MG: 40 TABLET, FILM COATED ORAL at 18:42

## 2024-01-21 RX ADMIN — ASPIRIN 81 MG: 81 TABLET, COATED ORAL at 09:09

## 2024-01-21 RX ADMIN — METOPROLOL SUCCINATE 50 MG: 50 TABLET, EXTENDED RELEASE ORAL at 20:56

## 2024-01-21 RX ADMIN — HEPARIN SODIUM 5000 UNITS: 5000 INJECTION INTRAVENOUS; SUBCUTANEOUS at 20:53

## 2024-01-21 RX ADMIN — ACETAMINOPHEN 650 MG: 325 TABLET, FILM COATED ORAL at 14:32

## 2024-01-21 RX ADMIN — ONDANSETRON 4 MG: 2 INJECTION INTRAMUSCULAR; INTRAVENOUS at 00:10

## 2024-01-21 RX ADMIN — CEFEPIME 1000 MG: 1 INJECTION, POWDER, FOR SOLUTION INTRAMUSCULAR; INTRAVENOUS at 14:36

## 2024-01-21 RX ADMIN — DAKIN'S SOLUTION 0.125% (QUARTER STRENGTH): 0.12 SOLUTION at 09:14

## 2024-01-21 NOTE — PROGRESS NOTES
NEPHROLOGY PROGRESS NOTE   Asad Galloway 63 y.o. male MRN: [id number]  Unit/Bed#: NW8 874-01 Encounter: [id number]      ASSESSMENT & PLAN:  #[place] [facility] [place] dialysis treatment on Friday.  For hemodialysis treatment tomorrow, UF as tolerated.    2 nonhealing left heel ulcer sent for osteomyelitis, IR consulted for angiogram possible on Tuesday 1/23    3 hemodynamics, blood pressure is stable.  Patient's son requests to make sure the blood pressure cuff is well positioning during dialysis, reported he normally tolerates UF during dialysis well without any issues as an outpatient    4 anemia of chronic kidney disease, no BAYLEE due to history of CVA, monitor H&H and fever hemoglobin less than 7 last hemoglobin 9.5    5 Access left upper extremity AV fistula in place    SUBJECTIVE:  Seen and examined, no complaints, no chest pain, no shortness of breath, no abdominal pain, no nausea or vomiting, patient's family at bedside    OBJECTIVE:  Current Weight: Weight - Scale: 94.8 kg (209 lb)  Vitals:    01/21/24 0846   BP: 140/74   Pulse: 68   Resp: 16   Temp:    SpO2: 96%       Intake/Output Summary (Last 24 hours) at 1/21/2024 1339  Last data filed at 1/21/2024 0600  Gross per 24 hour   Intake --   Output 150 ml   Net -150 ml       General: conscious, cooperative, in not acute distress  Eyes: conjunctivae pink, anicteric sclerae  ENT: lips and mucous membranes moist  Neck: supple, no JVD  Chest: clear breath sounds bilateral, no crackles, ronchus or wheezings  CVS: distinct S1 & S2, normal rate, regular rhythm  Abdomen: soft, non-tender, non-distended, normoactive bowel sounds  Extremities: no edema of both legs, left foot covered with dressing  Skin: no rash  Neuro: awake, alert, oriented        Medications:    Current Facility-Administered Medications:     acetaminophen (TYLENOL) tablet 650 mg, 650 mg, Oral, Q8H [facility], 650 mg at 01/20/24 2133    aspirin (ECOTRIN LOW STRENGTH) EC tablet 81 mg, 81  mg, Oral, Daily, Speedy Quintero MD, 81 mg at 01/21/24 0909    atorvastatin (LIPITOR) tablet 40 mg, 40 mg, Oral, Daily With Dinner, Speedy Quintero MD, 40 mg at 01/20/24 1753    cefepime (MAXIPIME) 1,000 mg in dextrose 5 % 50 mL IVPB, 1,000 mg, Intravenous, Q24H, Speedy Quintero MD, Last Rate: 100 mL/hr at 01/20/24 1442, 1,000 mg at 01/20/24 1442    divalproex sodium (DEPAKOTE SPRINKLE) capsule 250 mg, 250 mg, Oral, Q12H [facility], Speedy Quintero MD, 250 mg at 01/21/24 0913    heparin (porcine) subcutaneous injection 5,000 Units, 5,000 Units, Subcutaneous, Q8H [facility], Speedy Quintero MD, 5,000 Units at 01/20/24 2134    insulin lispro (HumaLOG) 100 units/mL subcutaneous injection 1-6 Units, 1-6 Units, Subcutaneous, TID AC **AND** Fingerstick Glucose (POCT), , , TID AC, Speedy Quintero MD    LORazepam (ATIVAN) injection 0.5 mg, 0.5 mg, Intravenous, Once, Mary Turner PA-C    LORazepam (ATIVAN) injection 0.5 mg, 0.5 mg, Intravenous, Once PRN, Mary Turner PA-C    metoprolol succinate (TOPROL-XL) 24 hr tablet 50 mg, 50 mg, Oral, BID, Speedy Quintero MD, 50 mg at 01/21/24 0913    midodrine (PROAMATINE) tablet 10 mg, 10 mg, Oral, Before Dialysis, Joselyn Reyes Bahamonde, MD, 10 mg at 01/19/24 1305    ondansetron (ZOFRAN) injection 4 mg, 4 mg, Intravenous, Q6H PRN, Speedy Quintero MD, 4 mg at 01/21/24 0010    oxyCODONE (ROXICODONE) split tablet 2.5 mg, 2.5 mg, Oral, TID PRN, 2.5 mg at 01/20/24 1006 **OR** oxyCODONE (ROXICODONE) IR tablet 5 mg, 5 mg, Oral, TID PRN, Speedy Quintero MD, 5 mg at 01/20/24 2258    prasugrel (EFFIENT) tablet 5 mg, 5 mg, Oral, Daily, Speedy Quintero MD, 5 mg at 01/21/24 0921    sodium hypochlorite (DAKIN'S QUARTER-STRENGTH) 0.125 percent topical solution, , Irrigation, Daily, Denisse Betancourt DPM, Given at 01/21/24 0914    Invasive Devices:        Lab Results:   Results from last 7 days   Lab Units 01/20/24  0526 01/19/24  1246 01/18/24  1455   WBC Thousand/uL 4.46 4.64 4.38   HEMOGLOBIN g/dL 9.5* 9.0* 10.1*   HEMATOCRIT % 30.8* 29.0*  "33.7*   PLATELETS Thousands/uL 99* 102* 99*   SODIUM mmol/L 133* 136 155*   POTASSIUM mmol/L 5.3 5.5* 5.3   CHLORIDE mmol/L 97 97 108   CO2 mmol/L 23 27 27   BUN mg/dL 42* 63* 46*   CREATININE mg/dL 6.28* 7.59* 5.76*   CALCIUM mg/dL 7.8* 8.1* 8.1*   ALK PHOS U/L  --   --  94   ALT U/L  --   --  7   AST U/L  --   --  10*           Portions of the record may have been created with voice recognition software. Occasional wrong word or \"sound a like\" substitutions may have occurred due to the inherent limitations of voice recognition software. Read the chart carefully and recognize, using context, where substitutions have occurred.If you have any questions, please contact the dictating provider.  "

## 2024-01-21 NOTE — ASSESSMENT & PLAN NOTE
Pt presented with chronic nonhealing ulcer of the left heel, positive probe to bone concerning for clinical osteomyelitis. Sent in from podiatry office outpatient for direct admission   Patient afebrile, no leukocytosis and hemodynamically stable  Vascular surgery following,  S/p LEADS with diffuse disease but no evidence of significant stenosis or occlusion, toe pressures below healing range  No plans for surgical intervention at this time, consideration for angiography, son refusing at this time based on prior conversation he states he has had with Dr Galvan and Dr Church   Angiogram continued in orders pending final conversation with vascular and podiatry today reminded today  Podiatry following,  WBAT left foot with surgical shoe  Plan for MRI of foot ordered not yet completed   Continue renally adjusted dose cefepime.   Continue cefepime at this time still pending final culture - levaquin 10 days total   Follow wound cultures, pseudomonas and klebsiella   Consider ID consult pending clinical course, will wait for sensitivities

## 2024-01-21 NOTE — CASE COMMUNICATION
1340...Patients wife Gris called into office reporting that patient had VNA services but went to hospital. States he is currently in hospital but is probably going home within the next few days. Wanted to resume services when he DC. Notified Gris to let staff know at hospital so orders can be sent to VNA. Gris verbalized understanding.

## 2024-01-21 NOTE — ASSESSMENT & PLAN NOTE
Per nursing staff patient slid out of wheelchair when attempting transport down to dialysis, 1/19. This was witnessed by both transport staff and wife. Pt did not have head strike, no significant fall, slid to the floor. He was able to get back into bed.   Pt without any upper extremity pain, no evidence of significant bruising, full ROM of the lower extremities bilaterally. Only complaint of pain in the left heel where wound is noted   No indication for any imaging currently  PT/OT consultations not yet seen  Wife and son requesting pt to return home when cleared by ortho and podiatry   Pt not in waste belt at this time   Pt has underlying dementia and at this time does not feel he wants to die he is frustrated with his situation does not have any plans

## 2024-01-21 NOTE — ASSESSMENT & PLAN NOTE
Peripheral artery disease status post LLE a gram with balloon angioplasty of PT and AT 9/2023.  Presented with nonhealing left heel ulcer as above, from   Appreciate vascular surgery and podiatry recommendations as above   However on discussion with Pts son , he reports that he has had very extensive discussion with Dr Galvan and Dr Church . I have reached out to vascular and podiatry 1/20 and again this am 1/21. They have seen pt and will talk with son today   Continue aspirin and statin.

## 2024-01-21 NOTE — ASSESSMENT & PLAN NOTE
Lab Results   Component Value Date    HGBA1C 5.1 01/10/2024       Recent Labs     01/20/24  1125 01/20/24  1646 01/20/24  2106 01/21/24  0848   POCGLU 174* 145* 123 120         Blood Sugar Average: Last 72 hrs:  (P) 122.6  Diet controlled at baseline   Sliding scale insulin with meals here   QID glucose checks   Monitor and adjust regimen as needed

## 2024-01-21 NOTE — NURSING NOTE
"Patient refusing medications this morning. Patient stating, \"I don't want to live anymore.\" Denies plan. Patient depressed. Sent message to provider again. Emotional support given.  "

## 2024-01-21 NOTE — PROGRESS NOTES
[facility]  Progress Note  Name: Asad Galloway I  MRN: [id number]  Unit/Bed#: NW8 874-01 I Date of Admission: 1/18/2024   Date of Service: 1/21/2024 I Hospital Day: 3    Assessment/Plan   * Nonhealing ulcer of heel (HCC)  Assessment & Plan  Pt presented with chronic nonhealing ulcer of the left heel, positive probe to bone concerning for clinical osteomyelitis. Sent in from podiatry office outpatient for direct admission   Patient afebrile, no leukocytosis and hemodynamically stable  Vascular surgery following,  S/p LEADS with diffuse disease but no evidence of significant stenosis or occlusion, toe pressures below healing range  No plans for surgical intervention at this time, consideration for angiography, son refusing at this time based on prior conversation he states he has had with Dr Galvan and Dr Church   Angiogram continued in orders pending final conversation with vascular and podiatry today reminded today  Podiatry following,  WBAT left foot with surgical shoe  Plan for MRI of foot ordered not yet completed   Continue renally adjusted dose cefepime.   Continue cefepime at this time still pending final culture - levaquin 10 days total   Follow wound cultures, pseudomonas and klebsiella   Consider ID consult pending clinical course, will wait for sensitivities     Fall  Assessment & Plan  Per nursing staff patient slid out of wheelchair when attempting transport down to dialysis, 1/19. This was witnessed by both transport staff and wife. Pt did not have head strike, no significant fall, slid to the floor. He was able to get back into bed.   Pt without any upper extremity pain, no evidence of significant bruising, full ROM of the lower extremities bilaterally. Only complaint of pain in the left heel where wound is noted   No indication for any imaging currently  PT/OT consultations not yet seen  Wife and son requesting pt to return home when cleared by ortho and podiatry    Pt not in waste belt at this time   Pt has underlying dementia and at this time does not feel he wants to die he is frustrated with his situation does not have any plans     ESRD on dialysis ([facility])  Assessment & Plan  Lab Results   Component Value Date    EGFR 8 01/20/2024    EGFR 6 01/19/2024    EGFR 9 01/18/2024    CREATININE 6.28 (H) 01/20/2024    CREATININE 7.59 (H) 01/19/2024    CREATININE 5.76 (H) 01/18/2024     On hemodialysis, MWF schedule  Nephrology following,  Continue  HD    Monitor labs    PAD (peripheral artery disease) ([facility])  Assessment & Plan  Peripheral artery disease status post LLE a gram with balloon angioplasty of PT and AT 9/2023.  Presented with nonhealing left heel ulcer as above, from   Appreciate vascular surgery and podiatry recommendations as above   However on discussion with Pts son , he reports that he has had very extensive discussion with Dr Galvan and Dr Church . I have reached out to vascular and podiatry 1/20 and again this am 1/21. They have seen pt and will talk with son today   Continue aspirin and statin.    Hypotension  Assessment & Plan  BP overall fairly stable  Continue midodrine 2.5 mg PRN with dialysis.    Mood disorder (HCC)  Assessment & Plan  Continue home Depakote 250 mg BID    Ischemic cardiomyopathy  Assessment & Plan  Most recent EF 40%.  Euvolemic on exam.  Continue Toprol XL 50 mg BID  Holding Entresto until vascular surgery plans as patient may require angiography, will resume tomorrow based on outcome of vascular and podiatry discussion with pts son   Volume management via dialysis    CAD, multiple vessel  Assessment & Plan  Stable.  Continue aspirin, prasugrel, metoprolol and statin.  Pt was complaining of some chest pain at rest on 1/19, EKG obtained without change, symptoms resolved, continue to monitor     Anemia  Assessment & Plan  In the setting of end-stage renal disease.  Hemoglobin stable.  BAYLEE per nephrology.    Mixed hyperlipidemia  Assessment &  Plan  Continue home atorvastatin 40 mg daily.    Type 2 diabetes mellitus with chronic kidney disease on chronic dialysis, without long-term current use of insulin ([facility])  Assessment & Plan  Lab Results   Component Value Date    HGBA1C 5.1 01/10/2024       Recent Labs     01/20/24  1125 01/20/24  1646 01/20/24  2106 01/21/24  0848   POCGLU 174* 145* 123 120         Blood Sugar Average: Last 72 hrs:  (P) 122.6  Diet controlled at baseline   Sliding scale insulin with meals here   QID glucose checks   Monitor and adjust regimen as needed             VTE Pharmacologic Prophylaxis: VTE Score: 4 High Risk (Score >/= 5) - Pharmacological DVT Prophylaxis Ordered: heparin. Sequential Compression Devices Ordered.    Mobility:   Basic Mobility Inpatient Raw Score: 18  JH-HLM Goal: 6: Walk 10 steps or more  JH-HLM Achieved: 6: Walk 10 steps or more  HLM Goal achieved. Continue to encourage appropriate mobility.    Patient Centered Rounds: I performed bedside rounds with nursing staff today.   Discussions with Specialists or Other Care Team Provider: nursing podiatry and vascular via TT    Education and Discussions with Family / Patient: Updated  (wife) at bedside.    Total Time Spent on Date of Encounter in care of patient: 45 mins. This time was spent on one or more of the following: performing physical exam; counseling and coordination of care; obtaining or reviewing history; documenting in the medical record; reviewing/ordering tests, medications or procedures; communicating with other healthcare professionals and discussing with patient's family/caregivers.    Current Length of Stay: 3 day(s)  Current Patient Status: Inpatient   Certification Statement: The patient will continue to require additional inpatient hospital stay due to ongoing iv abx pending cultures   Discharge Plan: Anticipate discharge in 24-48 hrs to home with home services.    Code Status: Level 1 - Full Code    Subjective:   Patient is up  sitting on edge of bed.  He denies any pain currently but does report that he gets frustrated when he has pain.  Discussion had with him in regards to some comments made yesterday.  Patient however denies wanting to hurt himself clearly has dementia and is forgetful.  He just reports that sometimes his pain is really bad.  He has no plan to harm himself    Objective:     Vitals:   Temp (24hrs), Av.2 °F (36.8 °C), Min:97.9 °F (36.6 °C), Max:98.4 °F (36.9 °C)    Temp:  [97.9 °F (36.6 °C)-98.4 °F (36.9 °C)] 98.4 °F (36.9 °C)  HR:  [64-68] 68  Resp:  [14-18] 16  BP: (110-143)/(44-74) 140/74  SpO2:  [94 %-98 %] 96 %  Body mass index is 33.73 kg/m².     Input and Output Summary (last 24 hours):     Intake/Output Summary (Last 24 hours) at 2024 1119  Last data filed at 2024 0600  Gross per 24 hour   Intake 240 ml   Output 150 ml   Net 90 ml       Physical Exam:   Physical Exam  Constitutional:       General: He is not in acute distress.     Appearance: He is obese. He is not ill-appearing, toxic-appearing or diaphoretic.   HENT:      Head: Normocephalic and atraumatic.      Mouth/Throat:      Pharynx: No oropharyngeal exudate.   Eyes:      General:         Right eye: No discharge.         Left eye: No discharge.   Abdominal:      General: There is no distension.      Palpations: There is no mass.      Tenderness: There is no abdominal tenderness. There is no guarding or rebound.      Hernia: No hernia is present.   Musculoskeletal:         General: Tenderness (Right foot Kerlix wrap intact) present. No swelling, deformity or signs of injury.      Right lower leg: No edema.      Left lower leg: No edema.   Skin:     Coloration: Skin is not jaundiced or pale.      Findings: No bruising, erythema, lesion or rash.   Neurological:      Mental Status: He is alert. Mental status is at baseline.   Psychiatric:         Behavior: Behavior normal.          Additional Data:     Labs:  Results from last 7 days   Lab Units  01/20/24  0526 01/19/24  1246   WBC Thousand/uL 4.46 4.64   HEMOGLOBIN g/dL 9.5* 9.0*   HEMATOCRIT % 30.8* 29.0*   PLATELETS Thousands/uL 99* 102*   NEUTROS PCT %  --  64   LYMPHS PCT %  --  21   MONOS PCT %  --  12   EOS PCT %  --  2     Results from last 7 days   Lab Units 01/20/24  0526 01/19/24  1246 01/18/24  1455   SODIUM mmol/L 133*   < > 155*   POTASSIUM mmol/L 5.3   < > 5.3   CHLORIDE mmol/L 97   < > 108   CO2 mmol/L 23   < > 27   BUN mg/dL 42*   < > 46*   CREATININE mg/dL 6.28*   < > 5.76*   ANION GAP mmol/L 13   < > 20   CALCIUM mg/dL 7.8*   < > 8.1*   ALBUMIN g/dL  --   --  4.0   TOTAL BILIRUBIN mg/dL  --   --  0.51   ALK PHOS U/L  --   --  94   ALT U/L  --   --  7   AST U/L  --   --  10*   GLUCOSE RANDOM mg/dL 100   < > 183*    < > = values in this interval not displayed.         Results from last 7 days   Lab Units 01/21/24  0848 01/20/24  2106 01/20/24  1646 01/20/24  1125 01/20/24  0800 01/19/24  1723 01/19/24  1145 01/19/24  0721 01/18/24  2109 01/18/24  1730   POC GLUCOSE mg/dl 120 123 145* 174* 90 96 116 137 128 97               Lines/Drains:  Invasive Devices       Peripheral Intravenous Line  Duration             Peripheral IV 01/18/24 Proximal;Right;Ventral (anterior) Forearm 2 days              Line  Duration             Hemodialysis AV Fistula Left Upper arm -- days                          Imaging: No pertinent imaging reviewed.    Recent Cultures (last 7 days):   Results from last 7 days   Lab Units 01/18/24  1408   GRAM STAIN RESULT  No polys seen*  4+ Gram positive cocci in pairs*  4+ Gram negative rods*  2+ Gram positive cocci in clusters*   WOUND CULTURE  3+ Growth of Klebsiella-Enterobacter  group*  3+ Growth of Pseudomonas aeruginosa*  3+ Growth of       Last 24 Hours Medication List:   Current Facility-Administered Medications   Medication Dose Route Frequency Provider Last Rate    acetaminophen  650 mg Oral Q8H [facility] Speedy Quintero MD      aspirin  81 mg Oral Daily Speedy Quintero MD       atorvastatin  40 mg Oral Daily With Dinner Speedy Quintero MD      cefepime  1,000 mg Intravenous Q24H Speedy Quintero MD 1,000 mg (01/20/24 1442)    divalproex sodium  250 mg Oral Q12H [facility] Speedy Quintero MD      heparin (porcine)  5,000 Units Subcutaneous Q8H [facility] Speedy Quintero MD      insulin lispro  1-6 Units Subcutaneous TID  Speedy Quintero MD      LORazepam  0.5 mg Intravenous Once Mary Turner PA-C      LORazepam  0.5 mg Intravenous Once PRN Mary Turner PA-C      metoprolol succinate  50 mg Oral BID Speedy Quintero MD      midodrine  10 mg Oral Before Dialysis Joselyn Reyes Bahamonde, MD      ondansetron  4 mg Intravenous Q6H PRN Speedy Quintero MD      oxyCODONE  2.5 mg Oral TID PRN Speedy Quintero MD      Or    oxyCODONE  5 mg Oral TID PRN Speedy Quintero MD      prasugrel  5 mg Oral Daily Speedy Quintero MD      sodium hypochlorite   Irrigation Daily Denisse Betancourt DPM          Today, Patient Was Seen By: BRITTANY Rodriguez    **Please Note: This note may have been constructed using a voice recognition system.**

## 2024-01-21 NOTE — NURSING NOTE
Notified by [facility] that patient's pulse ox reading is in the 80's. Woke patient. Patient is not symptomatic. Checked patient with portable pulse ox and o2 sat in the mid 90's. Patient does not appear to be in any respiratory distress. However, patient refusing to allow RN to change Masimo probe on finger. Patient hit primary RN several times when attempt made after explanation given.

## 2024-01-21 NOTE — PROGRESS NOTES
Podiatry - Progress Note  Patient: Asad Galloway 63 y.o. male   MRN: [id number]  PCP: Raj Auguste DO  Unit/Bed#: NW8 874-01 Encounter: [id number]  Date Of Visit: 01/21/24    ASSESSMENT:    Asad Galloway is a 63 y.o. male with:    Nonhealing left heel ulceration  T2DM  ESRD  PAD  Diabetic polyneuropathy  History of toe amputations  Medical noncompliance  Acute on chronic CHF      PLAN:    Plan to follow-up results of MRI of left ankle hindfoot to assess for osteomyelitis and continued IV antibiotics given Pseudomonas/Klebsiella infection of posterior heel wound  Long discussion over the phone with patient's son Sam, [phone], regarding further treatment of his father.  At this time patient's son was concerned regarding further treatment.  They are hesitant to proceed with an angiogram as they are trusted vascular surgeon Dr. Galvan was unsure about previously proceeding with an angiogram.  In addition they were unsure of why an MRI had not been completed of the hindfoot.  They would also like a wound VAC applied.  I discussed with them that we are waiting on final MRI evaluation to determine if osteomyelitis is present in the calcaneus.  In addition I explained to them that given the bacteria that is currently growing from the wound I prefer that we continue to treat the wound in an inpatient basis.  I told him I would reach out to the vascular team regarding further discussion with Dr. Galvan.  Patient's wife also requested that she bring in the home VAC which I am happy to place on patient.  They are amenable to this plan.  elevation and offloading on green foam wedges or pillows when non-ambulatory.  Rest of care per primary team.     Weightbearing status: Weightbearing as tolerated to left foot in a surgical shoe. When non-weightbearing he is to use a prevalon boot      SUBJECTIVE:     The patient was seen, evaluated, and assessed at bedside today. The patient was awake, alert, and in no acute distress. No  "acute events overnight. The patient reports pain to the left heel. Patient denies N/V/F/chills/SOB/CP.      OBJECTIVE:     Vitals:   /74 (BP Location: Right arm)   Pulse 68   Temp 98.4 °F (36.9 °C) (Oral)   Resp 16   Ht 5' 6\" (1.676 m)   Wt 94.8 kg (209 lb)   SpO2 96%   BMI 33.73 kg/m²     Temp (24hrs), Av.4 °F (36.9 °C), Min:98.4 °F (36.9 °C), Max:98.4 °F (36.9 °C)      Physical Exam:     Lungs: Non labored breathing  Abdomen: Soft, non-tender.  Lower Extremity:  Cardiovascular status at baseline from admission.  Neurological status at baseline from admission.  Musculoskeletal status at baseline from admission. No calf tenderness noted. Dressings left intact at bedside        Additional Data:     Labs:    Results from last 7 days   Lab Units 24  0526 24  1246   WBC Thousand/uL 4.46 4.64   HEMOGLOBIN g/dL 9.5* 9.0*   HEMATOCRIT % 30.8* 29.0*   PLATELETS Thousands/uL 99* 102*   NEUTROS PCT %  --  64   LYMPHS PCT %  --  21   MONOS PCT %  --  12   EOS PCT %  --  2     Results from last 7 days   Lab Units 24  0526 24  1246 24  1455   POTASSIUM mmol/L 5.3   < > 5.3   CHLORIDE mmol/L 97   < > 108   CO2 mmol/L 23   < > 27   BUN mg/dL 42*   < > 46*   CREATININE mg/dL 6.28*   < > 5.76*   CALCIUM mg/dL 7.8*   < > 8.1*   ALK PHOS U/L  --   --  94   ALT U/L  --   --  7   AST U/L  --   --  10*    < > = values in this interval not displayed.           * I Have Reviewed All Lab Data Listed Above.    Recent Cultures (last 7 days):     Results from last 7 days   Lab Units 24  1408   GRAM STAIN RESULT  No polys seen*  4+ Gram positive cocci in pairs*  4+ Gram negative rods*  2+ Gram positive cocci in clusters*   WOUND CULTURE  3+ Growth of Klebsiella oxytoca*  3+ Growth of Pseudomonas aeruginosa*  3+ Growth of     Results from last 7 days   Lab Units 24  5271   ANAEROBIC CULTURE  4+ Growth of - Mixed aerobic and anaerobic bacteria with predominance of Bacteroides " "thetaiotaomicron group*       Imaging: I have personally reviewed pertinent films in PACS  EKG, Pathology, and Other Studies: I have personally reviewed pertinent reports.    ** Please Note: Portions of the record may have been created with voice recognition software. Occasional wrong word or \"sound a like\" substitutions may have occurred due to the inherent limitations of voice recognition software. Read the chart carefully and recognize, using context, where substitutions have occurred. **      "

## 2024-01-21 NOTE — NURSING NOTE
"While rounding, noticed patient crying in room. Patient verbalizing, \"I just want to die.\" Patient says does not want to be in hospital or sick anymore. Provided emotional support and reassured patient and then notified SLIM on call about situation. Patient does not have a plan to hurt himself. No orders at this time. Asked patient if he wanted to speak with anyone over the phone for emotional support. Patient declined at this time.  "

## 2024-01-22 ENCOUNTER — APPOINTMENT (OUTPATIENT)
Dept: RADIOLOGY | Facility: HOSPITAL | Age: 64
DRG: 638 | End: 2024-01-22
Payer: MEDICARE

## 2024-01-22 ENCOUNTER — APPOINTMENT (INPATIENT)
Dept: DIALYSIS | Facility: HOSPITAL | Age: 64
DRG: 638 | End: 2024-01-22
Payer: MEDICARE

## 2024-01-22 ENCOUNTER — APPOINTMENT (INPATIENT)
Dept: RADIOLOGY | Facility: HOSPITAL | Age: 64
DRG: 638 | End: 2024-01-22
Payer: MEDICARE

## 2024-01-22 LAB
ALBUMIN SERPL BCP-MCNC: 3.7 G/DL (ref 3.5–5)
ALP SERPL-CCNC: 92 U/L (ref 34–104)
ALT SERPL W P-5'-P-CCNC: 9 U/L (ref 7–52)
ANION GAP SERPL CALCULATED.3IONS-SCNC: 14 MMOL/L
AST SERPL W P-5'-P-CCNC: 11 U/L (ref 13–39)
BASOPHILS # BLD AUTO: 0.02 THOUSANDS/ÂΜL (ref 0–0.1)
BASOPHILS NFR BLD AUTO: 1 % (ref 0–1)
BILIRUB SERPL-MCNC: 0.44 MG/DL (ref 0.2–1)
BUN SERPL-MCNC: 72 MG/DL (ref 5–25)
CALCIUM SERPL-MCNC: 7.8 MG/DL (ref 8.4–10.2)
CHLORIDE SERPL-SCNC: 97 MMOL/L (ref 96–108)
CO2 SERPL-SCNC: 24 MMOL/L (ref 21–32)
CREAT SERPL-MCNC: 9.39 MG/DL (ref 0.6–1.3)
EOSINOPHIL # BLD AUTO: 0.11 THOUSAND/ÂΜL (ref 0–0.61)
EOSINOPHIL NFR BLD AUTO: 3 % (ref 0–6)
ERYTHROCYTE [DISTWIDTH] IN BLOOD BY AUTOMATED COUNT: 15.8 % (ref 11.6–15.1)
GFR SERPL CREATININE-BSD FRML MDRD: 5 ML/MIN/1.73SQ M
GLUCOSE SERPL-MCNC: 102 MG/DL (ref 65–140)
GLUCOSE SERPL-MCNC: 123 MG/DL (ref 65–140)
GLUCOSE SERPL-MCNC: 89 MG/DL (ref 65–140)
GLUCOSE SERPL-MCNC: 90 MG/DL (ref 65–140)
HCT VFR BLD AUTO: 28.3 % (ref 36.5–49.3)
HGB BLD-MCNC: 9 G/DL (ref 12–17)
IMM GRANULOCYTES # BLD AUTO: 0.02 THOUSAND/UL (ref 0–0.2)
IMM GRANULOCYTES NFR BLD AUTO: 1 % (ref 0–2)
LYMPHOCYTES # BLD AUTO: 0.93 THOUSANDS/ÂΜL (ref 0.6–4.47)
LYMPHOCYTES NFR BLD AUTO: 22 % (ref 14–44)
MCH RBC QN AUTO: 30.9 PG (ref 26.8–34.3)
MCHC RBC AUTO-ENTMCNC: 31.8 G/DL (ref 31.4–37.4)
MCV RBC AUTO: 97 FL (ref 82–98)
MONOCYTES # BLD AUTO: 0.49 THOUSAND/ÂΜL (ref 0.17–1.22)
MONOCYTES NFR BLD AUTO: 11 % (ref 4–12)
NEUTROPHILS # BLD AUTO: 2.75 THOUSANDS/ÂΜL (ref 1.85–7.62)
NEUTS SEG NFR BLD AUTO: 62 % (ref 43–75)
NRBC BLD AUTO-RTO: 0 /100 WBCS
PLATELET # BLD AUTO: 97 THOUSANDS/UL (ref 149–390)
PMV BLD AUTO: 11.3 FL (ref 8.9–12.7)
POTASSIUM SERPL-SCNC: 5.7 MMOL/L (ref 3.5–5.3)
PROT SERPL-MCNC: 6.4 G/DL (ref 6.4–8.4)
RBC # BLD AUTO: 2.91 MILLION/UL (ref 3.88–5.62)
SODIUM SERPL-SCNC: 135 MMOL/L (ref 135–147)
WBC # BLD AUTO: 4.32 THOUSAND/UL (ref 4.31–10.16)

## 2024-01-22 PROCEDURE — 85025 COMPLETE CBC W/AUTO DIFF WBC: CPT | Performed by: NURSE PRACTITIONER

## 2024-01-22 PROCEDURE — 82948 REAGENT STRIP/BLOOD GLUCOSE: CPT

## 2024-01-22 PROCEDURE — 99232 SBSQ HOSP IP/OBS MODERATE 35: CPT | Performed by: STUDENT IN AN ORGANIZED HEALTH CARE EDUCATION/TRAINING PROGRAM

## 2024-01-22 PROCEDURE — 99232 SBSQ HOSP IP/OBS MODERATE 35: CPT | Performed by: PODIATRIST

## 2024-01-22 PROCEDURE — 5A1D70Z PERFORMANCE OF URINARY FILTRATION, INTERMITTENT, LESS THAN 6 HOURS PER DAY: ICD-10-PCS | Performed by: INTERNAL MEDICINE

## 2024-01-22 PROCEDURE — 90935 HEMODIALYSIS ONE EVALUATION: CPT | Performed by: INTERNAL MEDICINE

## 2024-01-22 PROCEDURE — 80053 COMPREHEN METABOLIC PANEL: CPT | Performed by: NURSE PRACTITIONER

## 2024-01-22 RX ORDER — DIVALPROEX SODIUM 125 MG/1
125 CAPSULE, COATED PELLETS ORAL EVERY 12 HOURS SCHEDULED
Status: DISCONTINUED | OUTPATIENT
Start: 2024-01-22 | End: 2024-01-24 | Stop reason: HOSPADM

## 2024-01-22 RX ADMIN — CEFEPIME 1000 MG: 1 INJECTION, POWDER, FOR SOLUTION INTRAMUSCULAR; INTRAVENOUS at 14:42

## 2024-01-22 RX ADMIN — ATORVASTATIN CALCIUM 40 MG: 40 TABLET, FILM COATED ORAL at 18:04

## 2024-01-22 RX ADMIN — ACETAMINOPHEN 650 MG: 325 TABLET, FILM COATED ORAL at 05:41

## 2024-01-22 RX ADMIN — DAKIN'S SOLUTION 0.125% (QUARTER STRENGTH): 0.12 SOLUTION at 12:24

## 2024-01-22 RX ADMIN — METOPROLOL SUCCINATE 50 MG: 50 TABLET, EXTENDED RELEASE ORAL at 12:23

## 2024-01-22 RX ADMIN — HEPARIN SODIUM 5000 UNITS: 5000 INJECTION INTRAVENOUS; SUBCUTANEOUS at 14:42

## 2024-01-22 RX ADMIN — METOPROLOL SUCCINATE 50 MG: 50 TABLET, EXTENDED RELEASE ORAL at 20:35

## 2024-01-22 RX ADMIN — HEPARIN SODIUM 5000 UNITS: 5000 INJECTION INTRAVENOUS; SUBCUTANEOUS at 05:41

## 2024-01-22 RX ADMIN — PRASUGREL 5 MG: 10 TABLET, FILM COATED ORAL at 12:24

## 2024-01-22 RX ADMIN — ASPIRIN 81 MG: 81 TABLET, COATED ORAL at 12:24

## 2024-01-22 RX ADMIN — OXYCODONE HYDROCHLORIDE 5 MG: 5 TABLET ORAL at 03:16

## 2024-01-22 RX ADMIN — DIVALPROEX SODIUM 125 MG: 125 CAPSULE, COATED PELLETS ORAL at 12:23

## 2024-01-22 RX ADMIN — ONDANSETRON 4 MG: 2 INJECTION INTRAMUSCULAR; INTRAVENOUS at 02:41

## 2024-01-22 RX ADMIN — HEPARIN SODIUM 5000 UNITS: 5000 INJECTION INTRAVENOUS; SUBCUTANEOUS at 21:30

## 2024-01-22 RX ADMIN — MIDODRINE HYDROCHLORIDE 10 MG: 5 TABLET ORAL at 07:58

## 2024-01-22 RX ADMIN — DIVALPROEX SODIUM 125 MG: 125 CAPSULE, COATED PELLETS ORAL at 20:35

## 2024-01-22 NOTE — PLAN OF CARE
Post-Dialysis RN Treatment Note    Blood Pressure:  Pre 135/81 mm/Hg  Post 143/88 mmHg   EDW  95.5 kg    Weight:  Pre 97 kg   Post 95.7 kg   Mode of weight measurement: Bed Scale   Volume Removed  1500 ml    Treatment duration 180 minutes    NS given  No    Treatment shortened? No   Medications given during Rx Midodrine   Estimated Kt/V  0.84   Access type: AV fistula   Access Issues: No    Report called to primary nurse   Yes FERDINAND. FRAN BYRNESN      Plan for 3 hour HD treatment with 1.5 kg UF as tolerated.    Problem: METABOLIC, FLUID AND ELECTROLYTES - ADULT  Goal: Electrolytes maintained within normal limits  Description: INTERVENTIONS:  - Monitor labs and assess patient for signs and symptoms of electrolyte imbalances  - Administer electrolyte replacement as ordered  - Monitor response to electrolyte replacements, including repeat lab results as appropriate  - Instruct patient on fluid and nutrition as appropriate  Outcome: Progressing  Goal: Fluid balance maintained  Description: INTERVENTIONS:  - Monitor labs   - Monitor I/O and WT  - Instruct patient on fluid and nutrition as appropriate  - Assess for signs & symptoms of volume excess or deficit  Outcome: Progressing

## 2024-01-22 NOTE — PROGRESS NOTES
[facility]  Progress Note  Name: Asad Galloway I  MRN: [id number]  Unit/Bed#: NW8 874-01 I Date of Admission: 1/18/2024   Date of Service: 1/22/2024 I Hospital Day: 4    Assessment/Plan   Fall  Assessment & Plan  Per nursing staff patient slid out of wheelchair when attempting transport down to dialysis, 1/19. This was witnessed by both transport staff and wife. Pt did not have head strike, no significant fall, slid to the floor. He was able to get back into bed.   Pt without any upper extremity pain, no evidence of significant bruising, full ROM of the lower extremities bilaterally. Only complaint of pain in the left heel where wound is noted   No indication for any imaging currently  PT/OT consultations not yet seen  Wife and son requesting pt to return home when cleared by ortho and podiatry     PAD (peripheral artery disease) ([facility])  Assessment & Plan  Peripheral artery disease status post LLE a gram with balloon angioplasty of PT and AT 9/2023.  Presented with nonhealing left heel ulcer as above, from   Appreciate vascular surgery and podiatry recommendations as above   However on discussion with Pts son , he reports that he has had very extensive discussion with Dr Galvan and Dr Church . I have reached out to vascular and podiatry 1/20 and again this am 1/21. Appreciate comanagement of these specialist services  Continue aspirin and statin.    Hypotension  Assessment & Plan  BP overall fairly stable  Continue midodrine 2.5 mg PRN with dialysis.    Mood disorder (HCC)  Assessment & Plan  Continue home Depakote 250 mg BID    Ischemic cardiomyopathy  Assessment & Plan  Most recent EF 40%.  Euvolemic on exam.  Continue Toprol XL 50 mg BID  Holding Entresto until vascular surgery plans as patient may require angiography, will resume tomorrow based on outcome of vascular and podiatry discussion with pts son   Volume management via dialysis    CAD, multiple vessel  Assessment &  Plan  Stable.  Continue aspirin, prasugrel, metoprolol and statin.  Pt was complaining of some chest pain at rest on 1/19, EKG obtained without change, symptoms resolved, continue to monitor     ESRD on dialysis ([facility])  Assessment & Plan  Lab Results   Component Value Date    EGFR 5 01/22/2024    EGFR 8 01/20/2024    EGFR 6 01/19/2024    CREATININE 9.39 (H) 01/22/2024    CREATININE 6.28 (H) 01/20/2024    CREATININE 7.59 (H) 01/19/2024     On hemodialysis, MWF schedule  Nephrology following,  Continue  HD    Monitor labs    Anemia  Assessment & Plan  In the setting of end-stage renal disease.  Hemoglobin stable.  BAYLEE per nephrology.    Mixed hyperlipidemia  Assessment & Plan  Continue home atorvastatin 40 mg daily.    Type 2 diabetes mellitus with chronic kidney disease on chronic dialysis, without long-term current use of insulin ([facility])  Assessment & Plan  Lab Results   Component Value Date    HGBA1C 5.1 01/10/2024       Recent Labs     01/21/24  1129 01/21/24  1724 01/21/24  2058 01/22/24  1227   POCGLU 145* 89 132 123         Blood Sugar Average: Last 72 hrs:  (P) [id number]  Diet controlled at baseline   Sliding scale insulin with meals here   QID glucose checks   Monitor and adjust regimen as needed    * Nonhealing ulcer of heel ([facility])  Assessment & Plan  Pt presented with chronic nonhealing ulcer of the left heel, positive probe to bone concerning for clinical osteomyelitis. Sent in from podiatry office outpatient for direct admission   Patient afebrile, no leukocytosis and hemodynamically stable  Vascular surgery following,  S/p LEADS with diffuse disease but no evidence of significant stenosis or occlusion, toe pressures below healing range  No plans for surgical intervention at this time, consideration for angiography, son refusing at this time based on prior conversation he states he has had with Dr Galvan and Dr Church   Angiogram continued in orders pending final conversation with vascular and podiatry  today reminded today  Podiatry following,  WBAT left foot with surgical shoe  Plan for MRI of foot ordered not yet completed, likely today  Continue renally adjusted dose cefepime.   Continue cefepime at this time still pending final culture - levaquin 10 days total   Follow wound cultures, pseudomonas and klebsiella   Consider ID consult pending clinical course, will wait for sensitivities            VTE Pharmacologic Prophylaxis: VTE Score: 4 Moderate Risk (Score 3-4) - Pharmacological DVT Prophylaxis Ordered: heparin.    Mobility:   Basic Mobility Inpatient Raw Score: 18  JH-HLM Goal: 6: Walk 10 steps or more  JH-HLM Achieved: 5: Stand (1 or more minutes)  HLM Goal NOT achieved. Continue with multidisciplinary rounding and encourage appropriate mobility to improve upon HLM goals.    Patient Centered Rounds: I performed bedside rounds with nursing staff today.   Discussions with Specialists or Other Care Team Provider: N/A    Education and Discussions with Family / Patient: Updated  (son) at bedside.    Total Time Spent on Date of Encounter in care of patient: 45 mins. This time was spent on one or more of the following: performing physical exam; counseling and coordination of care; obtaining or reviewing history; documenting in the medical record; reviewing/ordering tests, medications or procedures; communicating with other healthcare professionals and discussing with patient's family/caregivers.    Current Length of Stay: 4 day(s)  Current Patient Status: Inpatient   Certification Statement: The patient will continue to require additional inpatient hospital stay due to Lower extremity wound  Discharge Plan: Anticipate discharge tomorrow to home.    Code Status: Level 1 - Full Code    Subjective:   Seen and examined at dialysis this morning.  Patient resting comfortably.  He says he has no issues at present.  Lengthy discussion with son at bedside in which we discussed current workup and prognosis.   Answered all questions at this time.  Awaiting MRI, family understandably frustrated with the length of time patient has remained in hospital without receiving his MRI    Objective:     Vitals:   Temp (24hrs), Av.9 °F (36.6 °C), Min:97.6 °F (36.4 °C), Max:98.4 °F (36.9 °C)    Temp:  [97.6 °F (36.4 °C)-98.4 °F (36.9 °C)] 97.8 °F (36.6 °C)  HR:  [62-66] 66  Resp:  [16-18] 16  BP: (101-143)/(46-88) 143/88  SpO2:  [98 %] 98 %  Body mass index is 33.73 kg/m².     Input and Output Summary (last 24 hours):     Intake/Output Summary (Last 24 hours) at 2024 1507  Last data filed at 2024 1053  Gross per 24 hour   Intake 500 ml   Output 2000 ml   Net -1500 ml       Physical Exam:   Physical Exam  Constitutional:       General: He is not in acute distress.     Appearance: He is obese. He is not ill-appearing, toxic-appearing or diaphoretic.   HENT:      Head: Normocephalic and atraumatic.      Mouth/Throat:      Mouth: Mucous membranes are moist.      Pharynx: No oropharyngeal exudate.   Eyes:      General:         Right eye: No discharge.         Left eye: No discharge.   Cardiovascular:      Rate and Rhythm: Normal rate and regular rhythm.      Pulses: Normal pulses.   Pulmonary:      Effort: Pulmonary effort is normal. No respiratory distress.      Breath sounds: Normal breath sounds.   Abdominal:      General: There is no distension.      Palpations: There is no mass.      Tenderness: There is no abdominal tenderness. There is no guarding or rebound.      Hernia: No hernia is present.   Musculoskeletal:         General: Tenderness (Right foot Kerlix wrap intact) present. No swelling, deformity or signs of injury.      Right lower leg: No edema.      Left lower leg: No edema.   Skin:     Coloration: Skin is not jaundiced or pale.      Findings: No bruising, erythema, lesion or rash.   Neurological:      Mental Status: He is alert. Mental status is at baseline.   Psychiatric:         Behavior: Behavior normal.           Additional Data:     Labs:  Results from last 7 days   Lab Units 01/22/24  0547   WBC Thousand/uL 4.32   HEMOGLOBIN g/dL 9.0*   HEMATOCRIT % 28.3*   PLATELETS Thousands/uL 97*   NEUTROS PCT % 62   LYMPHS PCT % 22   MONOS PCT % 11   EOS PCT % 3     Results from last 7 days   Lab Units 01/22/24  0547   SODIUM mmol/L 135   POTASSIUM mmol/L 5.7*   CHLORIDE mmol/L 97   CO2 mmol/L 24   BUN mg/dL 72*   CREATININE mg/dL 9.39*   ANION GAP mmol/L 14   CALCIUM mg/dL 7.8*   ALBUMIN g/dL 3.7   TOTAL BILIRUBIN mg/dL 0.44   ALK PHOS U/L 92   ALT U/L 9   AST U/L 11*   GLUCOSE RANDOM mg/dL 102         Results from last 7 days   Lab Units 01/22/24  1227 01/21/24  2058 01/21/24  1724 01/21/24  1129 01/21/24  0848 01/20/24  2106 01/20/24  1646 01/20/24  1125 01/20/24  0800 01/19/24  1723 01/19/24  1145 01/19/24  0721   POC GLUCOSE mg/dl 123 132 89 145* 120 123 145* 174* 90 96 116 137               Lines/Drains:  Invasive Devices       Peripheral Intravenous Line  Duration             Peripheral IV 01/18/24 Proximal;Right;Ventral (anterior) Forearm 4 days              Line  Duration             Hemodialysis AV Fistula Left Upper arm -- days                          Imaging: No pertinent imaging reviewed.    Recent Cultures (last 7 days):   Results from last 7 days   Lab Units 01/18/24  1408   GRAM STAIN RESULT  No polys seen*  4+ Gram positive cocci in pairs*  4+ Gram negative rods*  2+ Gram positive cocci in clusters*   WOUND CULTURE  3+ Growth of Klebsiella oxytoca*  3+ Growth of Pseudomonas aeruginosa*  3+ Growth of       Last 24 Hours Medication List:   Current Facility-Administered Medications   Medication Dose Route Frequency Provider Last Rate    acetaminophen  650 mg Oral Q8H [facility] Speedy Quintero MD      aspirin  81 mg Oral Daily Speedy Quintero MD      atorvastatin  40 mg Oral Daily With Dinner Speedy Quintero MD      cefepime  1,000 mg Intravenous Q24H Speedy Quintero MD 1,000 mg (01/22/24 1442)    divalproex sodium  250 mg Oral  Q12H [facility] Speedy Quintero MD      heparin (porcine)  5,000 Units Subcutaneous Q8H [facility] Speedy Quintero MD      insulin lispro  1-6 Units Subcutaneous TID AC Speedy Quintero MD      LORazepam  0.5 mg Intravenous Once Mary Turner PA-C      LORazepam  0.5 mg Intravenous Once PRN Mary Turner PA-C      metoprolol succinate  50 mg Oral BID Speedy Quintero MD      midodrine  10 mg Oral Before Dialysis Joselyn Reyes Bahamonde, MD      ondansetron  4 mg Intravenous Q6H PRN Speedy Quintero MD      oxyCODONE  2.5 mg Oral TID PRN Speedy Quintero MD      Or    oxyCODONE  5 mg Oral TID PRN Speedy Quintero MD      prasugrel  5 mg Oral Daily Speedy Quintero MD      sodium hypochlorite   Irrigation Daily Denisse Betancourt DPM          Today, Patient Was Seen By: Edward Mortensen    **Please Note: This note may have been constructed using a voice recognition system.**

## 2024-01-22 NOTE — CASE MANAGEMENT
Case Management Discharge Planning Note    Patient name Asad Galloway  Location NW8 874/NW8 874-01 MRN [id number]  : 1960 Date 2024       Current Admission Date: 2024  Current Admission Diagnosis:Nonhealing ulcer of heel ([facility])   Patient Active Problem List    Diagnosis Date Noted    Fall 2024    PAD (peripheral artery disease) ([facility]) 2024    Acute on chronic systolic (congestive) heart failure ([facility]) 2024    Hypotension 2023    Edema of left lower extremity 2023    Rectal bleeding 2023    Nonhealing ulcer of heel ([facility]) 2023    Elevated troponin 2023    Presence of external cardiac defibrillator 2023    Diabetic polyneuropathy associated with type 2 diabetes mellitus ([facility]) 2023    Absence of toe of right foot ([facility]) 2023    Hammer toes of both feet 2023    Type 1 non-ST elevation myocardial infarction (NSTEMI) s/p ADE to in-stent restenosis of mid LAD 2023    Ischemic cardiomyopathy 2023    Moderate AS (aortic stenosis) 2023    Mood disorder ([facility]) 2023    SOB (shortness of breath) 10/21/2022    Left arm swelling 10/21/2022    CAD, multiple vessel 2022    Electrolyte abnormality 2022    Chest pain 2022    Generalized weakness 2022    Primary hypertension 2022    Penetrating foot wound, left, initial encounter 2022    Emotional lability 2022    History of 2019 novel coronavirus disease (COVID-19) 2020    ESRD on dialysis ([facility]) 2020    Anemia 10/05/2020    S/P arteriovenous (AV) fistula creation 2020    Pre-kidney transplant, listed 2020    Urge incontinence 2019    Anxiety associated with depression 2019    History of stroke 10/04/2018    Abnormal EEG 2018    Other constipation 2018    GERD (gastroesophageal reflux disease) 2018    Elevated alkaline phosphatase level 2018    Nephrotic range proteinuria  03/28/2018    Type 2 diabetes mellitus with chronic kidney disease on chronic dialysis, without long-term current use of insulin (HCC) 02/26/2016    Dizziness 02/26/2016    Mixed hyperlipidemia 02/26/2016    Antiplatelet or antithrombotic long-term use 02/26/2016      LOS (days): 4  Geometric Mean LOS (GMLOS) (days): 3  Days to GMLOS:-1     OBJECTIVE:  Risk of Unplanned Readmission Score: 35.47         Current admission status: Inpatient   Preferred Pharmacy:   [id number] - [place] - [address]  [address] [place] [address]  Phone: [phone] Fax: [phone]    Primary Care Provider: Raj Auguste DO    Primary Insurance: MEDICARE  Secondary Insurance: [employer]    DISCHARGE DETAILS:    Discharge planning discussed with:: pt's son  Freedom of Choice: Yes    Contacts  Patient Contacts: Sam Galloway (Son)  Relationship to Patient:: Family  Contact Method: Phone  Phone Number: [phone] (M)  Reason/Outcome: Discharge Planning, Referral    Requested Home Health Care         Is the patient interested in HHC at discharge?: Yes  Home Health Discipline requested:: Nursing, Occupational Therapy, Physical Therapy  Home Health Agency Name:: [facility]  Home Health Follow-Up Provider:: PCP  Home Health Services Needed:: Strengthening/Theraputic Exercises to Improve Function, Evaluate Functional Status and Safety, Gait/ADL Training, Wound/Ostomy Care  Homebound Criteria Met:: Requires the Assistance of Another Person for Safe Ambulation or to Leave the Home, Uses an Assist Device (i.e. cane, walker, etc)  Supporting Clincal Findings:: Limited Endurance    DME Referral Provided  Referral made for DME?: No (pt already owns RW.)    Other Referral/Resources/Interventions Provided:  Interventions: HHC  Referral Comments: Accepting HHC options reviewed with son, per [facility] communication pt is currently with -[facility] for SN services, -[facility] able to accept for SN/PT/OT. CM discussed with  son, Son interested in [facility] with SN as well as added PT/OT. SL-[facility] added to AVS and face to face updated. CM updated SLIM on same and will continue to follow.    Treatment Team Recommendation: Home with Home Health Care  Discharge Destination Plan:: Home with Home Health Care    IMM Given (Date):: 01/22/24 (copy placed in medical records bin)  IMM Given to:: Family

## 2024-01-22 NOTE — ASSESSMENT & PLAN NOTE
Pt presented with chronic nonhealing ulcer of the left heel, positive probe to bone concerning for clinical osteomyelitis. Sent in from podiatry office outpatient for direct admission   Patient afebrile, no leukocytosis and hemodynamically stable  Vascular surgery following,  S/p LEADS with diffuse disease but no evidence of significant stenosis or occlusion, toe pressures below healing range  No plans for surgical intervention at this time, consideration for angiography, son refusing at this time based on prior conversation he states he has had with Dr Galvan and Dr Church   Angiogram continued in orders pending final conversation with vascular and podiatry today reminded today  Podiatry following,  WBAT left foot with surgical shoe  Plan for MRI of foot ordered not yet completed, likely today  Continue renally adjusted dose cefepime.   Continue cefepime at this time still pending final culture - levaquin 10 days total   Follow wound cultures, pseudomonas and klebsiella   Consider ID consult pending clinical course, will wait for sensitivities

## 2024-01-22 NOTE — PROGRESS NOTES
"Podiatry - Progress Note  Patient: Asad Galloway 63 y.o. male   MRN: [id number]  PCP: Raj Auguste DO  Unit/Bed#: NW8 874-01 Encounter: [id number]  Date Of Visit: 24    ASSESSMENT:    Asad Galloway is a 63 y.o. male with:    Nonhealing left heel ulceration  T2DM  ESRD  PAD  Diabetic polyneuropathy  History of toe amputations  Medical noncompliance  Acute on chronic CHF      PLAN:    Plan follow-up MRI of left ankle/hindfoot to assess for osteomyelitis.  Positive for proned above which is concerning for osteo of the calcaneus  Continued IV cefepime per ID.  Patient's MRI was delayed as patient has pain stimulator which required a risk versus benefit discussion with the radiology team.  As of now, patient is cleared for MRI and will hopefully be getting his treatment tonight on 2024, hopefully after 7 PM.  Patient is family are anxious to be discharged from the hospital, however I expressed to them the importance of getting a MRI of the hindfoot.  As of now, there open to staying.  Continue local wound care, appreciate nursing assistance with dressing changes.  Elevation and offloading on green foam wedges or pillows when non-ambulatory.  Rest of care per primary team.     Weightbearing status: Weightbearing as tolerated left  foot in surgical shoe    SUBJECTIVE:     The patient was seen, evaluated, and assessed at bedside today. The patient was awake, alert, and in no acute distress. No acute events overnight. The patient reports pain to the left heel. Patient denies N/V/F/chills/SOB/CP.      OBJECTIVE:     Vitals:   /78 (BP Location: Right arm)   Pulse 68   Temp 98 °F (36.7 °C) (Oral)   Resp 18   Ht 5' 6\" (1.676 m)   Wt 94.8 kg (209 lb)   SpO2 98%   BMI 33.73 kg/m²     Temp (24hrs), Av.9 °F (36.6 °C), Min:97.6 °F (36.4 °C), Max:98.4 °F (36.9 °C)      Physical Exam:     Lungs: Non labored breathing  Abdomen: Soft, non-tender.  Lower Extremity:  Cardiovascular status at baseline from " "admission.  Neurological status at baseline from admission.  Musculoskeletal status at baseline from admission. No calf tenderness noted.     Lower extremity wound(s) as noted below:     Wound #: 1  Location: posterior medial left heel  Length 2cm: Width 1cm: Depth 0.8cm:   Deepest Tissue Noted in Base: bone  Probe to Bone: Yes  Peripheral Skin Description: Attached  Granulation: 10% Fibrotic Tissue: 80% Necrotic Tissue: 10%   Drainage Amount: minimal, serous  Signs of Infection: Yes  - malodor, positive probe to bone      Additional Data:     Labs:    Results from last 7 days   Lab Units 01/22/24  0547   WBC Thousand/uL 4.32   HEMOGLOBIN g/dL 9.0*   HEMATOCRIT % 28.3*   PLATELETS Thousands/uL 97*   NEUTROS PCT % 62   LYMPHS PCT % 22   MONOS PCT % 11   EOS PCT % 3     Results from last 7 days   Lab Units 01/22/24  0547   POTASSIUM mmol/L 5.7*   CHLORIDE mmol/L 97   CO2 mmol/L 24   BUN mg/dL 72*   CREATININE mg/dL 9.39*   CALCIUM mg/dL 7.8*   ALK PHOS U/L 92   ALT U/L 9   AST U/L 11*           * I Have Reviewed All Lab Data Listed Above.    Recent Cultures (last 7 days):     Results from last 7 days   Lab Units 01/18/24  1408   GRAM STAIN RESULT  No polys seen*  4+ Gram positive cocci in pairs*  4+ Gram negative rods*  2+ Gram positive cocci in clusters*   WOUND CULTURE  3+ Growth of Klebsiella oxytoca*  3+ Growth of Pseudomonas aeruginosa*  3+ Growth of     Results from last 7 days   Lab Units 01/18/24  1408   ANAEROBIC CULTURE  4+ Growth of - Mixed aerobic and anaerobic bacteria with predominance of Bacteroides thetaiotaomicron group*       Imaging: I have personally reviewed pertinent films in PACS  EKG, Pathology, and Other Studies: I have personally reviewed pertinent reports.    ** Please Note: Portions of the record may have been created with voice recognition software. Occasional wrong word or \"sound a like\" substitutions may have occurred due to the inherent limitations of voice recognition software. " Read the chart carefully and recognize, using context, where substitutions have occurred. **

## 2024-01-22 NOTE — CASE MANAGEMENT
Case Management Discharge Planning Note    Patient name Asad Galloway  Location NW8 874/NW8 874-01 MRN [id number]  : 1960 Date 2024       Current Admission Date: 2024  Current Admission Diagnosis:Nonhealing ulcer of heel ([facility])   Patient Active Problem List    Diagnosis Date Noted    Fall 2024    PAD (peripheral artery disease) ([facility]) 2024    Acute on chronic systolic (congestive) heart failure ([facility]) 2024    Hypotension 2023    Edema of left lower extremity 2023    Rectal bleeding 2023    Nonhealing ulcer of heel ([facility]) 2023    Elevated troponin 2023    Presence of external cardiac defibrillator 2023    Diabetic polyneuropathy associated with type 2 diabetes mellitus ([facility]) 2023    Absence of toe of right foot ([facility]) 2023    Hammer toes of both feet 2023    Type 1 non-ST elevation myocardial infarction (NSTEMI) s/p ADE to in-stent restenosis of mid LAD 2023    Ischemic cardiomyopathy 2023    Moderate AS (aortic stenosis) 2023    Mood disorder ([facility]) 2023    SOB (shortness of breath) 10/21/2022    Left arm swelling 10/21/2022    CAD, multiple vessel 2022    Electrolyte abnormality 2022    Chest pain 2022    Generalized weakness 2022    Primary hypertension 2022    Penetrating foot wound, left, initial encounter 2022    Emotional lability 2022    History of 2019 novel coronavirus disease (COVID-19) 2020    ESRD on dialysis ([facility]) 2020    Anemia 10/05/2020    S/P arteriovenous (AV) fistula creation 2020    Pre-kidney transplant, listed 2020    Urge incontinence 2019    Anxiety associated with depression 2019    History of stroke 10/04/2018    Abnormal EEG 2018    Other constipation 2018    GERD (gastroesophageal reflux disease) 2018    Elevated alkaline phosphatase level 2018    Nephrotic range proteinuria  03/28/2018    Type 2 diabetes mellitus with chronic kidney disease on chronic dialysis, without long-term current use of insulin (HCC) 02/26/2016    Dizziness 02/26/2016    Mixed hyperlipidemia 02/26/2016    Antiplatelet or antithrombotic long-term use 02/26/2016      LOS (days): 4  Geometric Mean LOS (GMLOS) (days): 3  Days to GMLOS:-0.9     OBJECTIVE:  Risk of Unplanned Readmission Score: 35.41         Current admission status: Inpatient   Preferred Pharmacy:   [id number] - [place] - [address]  [address]  [place] [address]  Phone: [phone] Fax: [phone]    Primary Care Provider: Raj Auguste DO    Primary Insurance: MEDICARE  Secondary Insurance: [facility] BLUE SHIELD    DISCHARGE DETAILS:    Discharge planning discussed with:: pt's son  Freedom of Choice: Yes  Comments - Freedom of Choice: therapy rec's discussed.  CM contacted family/caregiver?: Yes (Son Sam)  Were Treatment Team discharge recommendations reviewed with patient/caregiver?: Yes  Did patient/caregiver verbalize understanding of patient care needs?: Yes    Contacts  Patient Contacts: Sam Galloway (Son)  Relationship to Patient:: Family  Contact Method: Phone  Phone Number: [phone] (M)  Reason/Outcome: Discharge Planning, Continuity of Care    Requested Home Health Care         Is the patient interested in HHC at discharge?: Yes  Home Health Discipline requested:: Occupational Therapy, Physical Therapy  Home Health Agency Name:: Other  Home Health Follow-Up Provider:: PCP  Home Health Services Needed:: Evaluate Functional Status and Safety, Gait/ADL Training, Strengthening/Theraputic Exercises to Improve Function  Homebound Criteria Met:: Requires the Assistance of Another Person for Safe Ambulation or to Leave the Home, Uses an Assist Device (i.e. cane, walker, etc)  Supporting Clincal Findings:: Fatigues Easliy in Short Distances, Limited Endurance    DME Referral Provided  Referral made for DME?: No (pt  already owns a RW.)    Other Referral/Resources/Interventions Provided:  Interventions: HHC  Referral Comments: CM spoke with pt's son Sam regarding after care needs. Therapy recommendations discussed. Family interested in [facility] services for PT/OT. Pt has prior hx with -[facility], due to same son would prefer referral to . Referral placed for PT/OT at this time. Son states, pt's wound care needs are met through wound care office and declined the need for home nursing at this time. Per son, family in agreement with plan for d/c home and states his personal care needs will be meet by family. Per discussion with [facility] pt will likely be ready for d/c home with family today, pending MRI and further testing results. Son present at bedside and will provide transport home. CM will follow for accepting [facility] agency.    Treatment Team Recommendation: Home with Home Health Care  Discharge Destination Plan:: Home with Home Health Care  Transport at Discharge : Automobile, Family (Son Sam)

## 2024-01-22 NOTE — ASSESSMENT & PLAN NOTE
Lab Results   Component Value Date    HGBA1C 5.1 01/10/2024       Recent Labs     01/21/24  1129 01/21/24  1724 01/21/24 2058 01/22/24  1227   POCGLU 145* 89 132 123         Blood Sugar Average: Last 72 hrs:  (P) [id number]  Diet controlled at baseline   Sliding scale insulin with meals here   QID glucose checks   Monitor and adjust regimen as needed

## 2024-01-22 NOTE — PHYSICAL THERAPY NOTE
Physical Therapy Cancellation Note       01/22/24 1025   PT Last Visit   PT Visit Date 01/22/24   Note Type   Note type Cancelled Session   Cancel Reasons Patient off floor/hemodialysis         Attempted to see pt for PT treatment session today. At this time, pt off floor for HD. Will continue to follow as appropriate.     Isidro Merino PT, DPT

## 2024-01-22 NOTE — ASSESSMENT & PLAN NOTE
Peripheral artery disease status post LLE a gram with balloon angioplasty of PT and AT 9/2023.  Presented with nonhealing left heel ulcer as above, from   Appreciate vascular surgery and podiatry recommendations as above   However on discussion with Pts son , he reports that he has had very extensive discussion with Dr Galvan and Dr Church . I have reached out to vascular and podiatry 1/20 and again this am 1/21. Appreciate comanagement of these specialist services  Continue aspirin and statin.

## 2024-01-22 NOTE — PROCEDURES
NEPHROLOGY DIALYSIS PROCEDURE NOTE      Patient seen and examined on hemodialysis, tolerating procedure well. All documentation, labs, medications were reviewed by myself, and the treatment plan was reviewed with nurse and patient.     Seen on Dialysis at : 10:12 AM  Dialysis Access: Left Arm AV Fistula  Vitals: 114/70  Dialysis time: 4 hours  Dialyzer: F180  Sodium bath: 138  Potassium bath: 2 K+  Bicarbonate bath: 33  Calcium bath: 2.5  Ultrafiltration: 1-1.5 L  Blood flow rate: 400 cc/min  Dialysis flow rate: 600 cc/min  Dialysis temperature: 35C  Medications given on HD: Midodrine      Summary:    62 yo with PMH of ESRD on HD MWF, CVA, ICM, HTN, anemia, chronic left heel wound p/w nonhealing ulcer.  Nephrology is consulted for management of ESRD     Assessment/Plan:    End Stage Renal Disease  -Modality:In-Center Hemodialysis  -Schedule: Monday/Wednesday/Friday  -Dialysis Unit: [facility]  -Access: Left Arm AV Fistula  -Presciption: reviewed  -Status: stable, good BP, K 5.7 today  -Plan:   HD today with 2K bath aiming for EDW  Next HD on Wednesday  Continue current management    Anemia  - Not on BAYLEE due to CVA history  -monitor H/H, Hb stable  -continue current management    Mineral bone disorder-chronic kidney disease  - Renal diet  -not on Phos binders  -monitor Phos levels    Blood pressure/volume status  - euvolemic, 1.5 kg above, BP stable, midodrine prior to HD, aiming for EDW    Review of Systems: The entire 12 point ROS has been reviewed.    Physical Exam:    General: awake, NAD  Skin: No rash, dry  CVS: S1S2+ RRR  Lungs: clear  Abdomen: non-tender, soft  Access: audible thrill and bruit  Extremities: no edema  Neuro: no asterixis          Current Facility-Administered Medications:     acetaminophen (TYLENOL) tablet 650 mg, 650 mg, Oral, Q8H MICH, Speedy Quintero MD, 650 mg at 01/22/24 0541    aspirin (ECOTRIN LOW STRENGTH) EC tablet 81 mg, 81 mg, Oral, Daily, Speedy Quintero MD, 81 mg at 01/21/24 0909     atorvastatin (LIPITOR) tablet 40 mg, 40 mg, Oral, Daily With Dinner, Speedy Quintero MD, 40 mg at 01/21/24 1842    cefepime (MAXIPIME) 1,000 mg in dextrose 5 % 50 mL IVPB, 1,000 mg, Intravenous, Q24H, Speedy Quintero MD, Last Rate: 100 mL/hr at 01/21/24 1436, 1,000 mg at 01/21/24 1436    divalproex sodium (DEPAKOTE SPRINKLE) capsule 250 mg, 250 mg, Oral, Q12H MICH, Speedy Quintero MD, 250 mg at 01/21/24 2053    heparin (porcine) subcutaneous injection 5,000 Units, 5,000 Units, Subcutaneous, Q8H MICH, Speedy Quintero MD, 5,000 Units at 01/22/24 0541    insulin lispro (HumaLOG) 100 units/mL subcutaneous injection 1-6 Units, 1-6 Units, Subcutaneous, TID AC **AND** Fingerstick Glucose (POCT), , , TID AC, Speedy Quintero MD    LORazepam (ATIVAN) injection 0.5 mg, 0.5 mg, Intravenous, Once, Mary Turner PA-C    LORazepam (ATIVAN) injection 0.5 mg, 0.5 mg, Intravenous, Once PRN, Mary Turner PA-C    metoprolol succinate (TOPROL-XL) 24 hr tablet 50 mg, 50 mg, Oral, BID, Speedy Quintero MD, 50 mg at 01/21/24 2056    midodrine (PROAMATINE) tablet 10 mg, 10 mg, Oral, Before Dialysis, Joselyn Reyes Bahamonde, MD, 10 mg at 01/22/24 0758    ondansetron (ZOFRAN) injection 4 mg, 4 mg, Intravenous, Q6H PRN, Speedy Quintero MD, 4 mg at 01/22/24 0241    oxyCODONE (ROXICODONE) split tablet 2.5 mg, 2.5 mg, Oral, TID PRN, 2.5 mg at 01/20/24 1006 **OR** oxyCODONE (ROXICODONE) IR tablet 5 mg, 5 mg, Oral, TID PRN, Speedy Quintero MD, 5 mg at 01/22/24 0316    prasugrel (EFFIENT) tablet 5 mg, 5 mg, Oral, Daily, Speedy Quintero MD, 5 mg at 01/21/24 0921    sodium hypochlorite (DAKIN'S QUARTER-STRENGTH) 0.125 percent topical solution, , Irrigation, Daily, Denisse Betancourt DPM, Given at 01/21/24 0914

## 2024-01-22 NOTE — ASSESSMENT & PLAN NOTE
Lab Results   Component Value Date    EGFR 5 01/22/2024    EGFR 8 01/20/2024    EGFR 6 01/19/2024    CREATININE 9.39 (H) 01/22/2024    CREATININE 6.28 (H) 01/20/2024    CREATININE 7.59 (H) 01/19/2024     On hemodialysis, MWF schedule  Nephrology following,  Continue  HD    Monitor labs

## 2024-01-22 NOTE — CONSULTS
Consultation - Infectious Disease   Asad Galloway 63 y.o. male MRN: [id number]  Unit/Bed#: NW8 874-01 Encounter: [id number]      IMPRESSION & RECOMMENDATIONS:   Impression/Recommendations:  Chronic left heel ulcer.    In setting of DM, PAD.  Consider osteomyelitis given PTB per Podiatry.  X-ray negative.  MRI pending.  Wound culture with Pseudomonas, Klebseilla although may represent colonization as photos reveal minimal purulence, cellulitis.  Clinically stable without sepsis  Rec:  Continue cefepime for now  Agree with checking MRI to evaluate for osteomyelitis  LWC per Podiatry  Follow temperatures closely  If OM confirmed, ultimate treatment would include revascularization, bone debridement along with antibiotics.  IV antibiotics unlikely to be curative in this setting.    PAD.    Status post LLE A-gram, PTA 9/2023.  Recent LEADs show diffuse disease with low GTP.  Rec:  Await A-gram per Vascular surgery    DM with DPN.    Risk factor for ulcer as above, infection.    ESRD on HD.  Rec:  Dose adjust antibiotics for HD  Nephrology follow-up ongoing    ICM.    I discussed with Dr. Mortensen the above plan to continue IV antibiotics and await MRI.  He agrees with the plan.    Antibiotics:  Cefepime    Thank you for this consultation.  We will follow along with you.    HISTORY OF PRESENT ILLNESS:  Reason for Consult: Left heel ulcer    HPI: Asad Galloway is a 63 y.o. male with multiple medical problems as outlined above.  Admitted from podiatry for nonhealing left heel ulcer with concern for osteomyelitis.  Had wound culture obtained growing gram-negative rods.  Started on IV cefepime.  X-ray negative.  MRI pending.  Has been seen by vascular who is recommending arteriogram.  We are asked to comment on further evaluation and management.    In performing this consult, I have reviewed prior admission and outpatient visit records in detail.    REVIEW OF SYSTEMS:  No pain currently  A complete system-based review of  systems is otherwise negative.    PAST MEDICAL HISTORY:  Past Medical History:   Diagnosis Date    Cerebrovascular accident (CVA) due to thrombosis of left middle cerebral artery (HCC) 07/29/2018    Chronic kidney disease     Coronary artery disease     Diabetes mellitus (HCC)     not on meds    Dialysis patient (HCC)     M-W-F    Fistula     left upper arm for hemodialyis    GERD (gastroesophageal reflux disease)     History of coronary artery stent placement     x3    Hypercholesteremia     Hyperlipidemia     Hypertension     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    Limb alert care status     no BP/IV left arm    Neuropathy     Obesity     Osteomyelitis (HCC)     last assessed 11/4/16-per son not currently    PVC's (premature ventricular contractions)     sees [facility]    Stroke (HCC)     [facility] weeof July 2018 [facility]    TIA (transient ischemic attack) 10/28/2018    Wears dentures     Wears glasses      Past Surgical History:   Procedure Laterality Date    ABDOMINAL SURGERY      CARDIAC CATHETERIZATION N/A 05/02/2022    Procedure: Cardiac Coronary Angiogram;  Surgeon: Sam Davis MD;  [facility]: [facility] CARD[facility];  Service: Cardiology    CARDIAC CATHETERIZATION N/A 05/02/2022    Procedure: Cardiac pci;  Surgeon: Sam Davis MD;  Location: [facility] CARDIAC C[facility];  Service: Cardiology    CARDIAC CATHETERIZATION  02/01/2023    Procedure: Cardiac catheterization;  Surgeon: Sam Davis MD;  Location: [facility] CARDIAC [facility];  Service: Cardiology    CARDIAC CATHETERIZATION N/A 02/01/2023    Procedure: Cardiac pci;  Surgeon: Sam Davis MD;  [facility]: [facility];  Service: Cardiology    CARDIAC CATHETERIZATION N/A 02/01/2023    Procedure: Cardiac Coronary Angiogram;  Surgeon: Sam Davis MD;  Location: [facility]ARD[facility];  Service: Cardiology    CARDIAC CATHETERIZATION N/A 02/01/2023    Procedure: Cardiac other-IVUS;  Surgeon: Sam Davis MD;  Location: [facility] CARDIAC CATH [facility];  Service:  "Cardiology    CHOLECYSTECTOMY      Percutaneous    COLONOSCOPY      CYSTOSCOPY      HEMODIALYSIS ADULT  2024    IR LOWER EXTREMITY ANGIOGRAM  2023    OTHER SURGICAL HISTORY      \"stimulator to control bowel movements\"    NM ESOPHAGOGASTRODUODENOSCOPY TRANSORAL DIAGNOSTIC N/A 2016    Procedure: ESOPHAGOGASTRODUODENOSCOPY (EGD);  Surgeon: Adele Rowe MD;  Location: [facility] GI LAB;  Service: Gastroenterology    NM LAPAROSCOPY SURG CHOLECYSTECTOMY N/A 2016    Procedure: LAPAROSCOPIC CHOLECYSTECTOMY ;  Surgeon: Cliff Roman DO;  Location: [facility] Main OR;  Service: General    NM SLCTV CATHJ 3RD+ ORD SLCTV ABDL PEL/LXTR BRNCH Left 2023    Procedure: Left leg angiogram with intervention;  Surgeon: Michelle Galvan MD;  Location: [facility] MAIN OR;  Service: Vascular    ROTATOR CUFF REPAIR Right     SPINAL CORD STIMULATOR IMPLANT      \"Medtronic interstim model # 3058- in lower back to control bowel movements-currently turned off-battery is dead\"    TOE AMPUTATION Right 10/28/2016    Procedure: 3RD TOE AMPUTATION ;  Surgeon: Anjel Salas DPM;  Location: [facility] Main OR;  Service:        FAMILY HISTORY:  Non-contributory    SOCIAL HISTORY:  Social History     Substance and Sexual Activity   Alcohol Use Never     Social History     Substance and Sexual Activity   Drug Use No     Social History     Tobacco Use   Smoking Status Never   Smokeless Tobacco Never       ALLERGIES:  No Known Allergies    MEDICATIONS:  All current active medications have been reviewed, including antibiotics as outlined above.    PHYSICAL EXAM:  Vitals:  Temp:  [97.6 °F (36.4 °C)-98.4 °F (36.9 °C)] 98 °F (36.7 °C)  HR:  [62-68] 68  Resp:  [16-18] 18  BP: (101-148)/(46-88) 148/78  SpO2:  [98 %] 98 %  Temp (24hrs), Av.9 °F (36.6 °C), Min:97.6 °F (36.4 °C), Max:98.4 °F (36.9 °C)  Current: Temperature: 98 °F (36.7 °C)     Physical Exam:  General:  Well-nourished, well-developed, in no acute distress  Eyes:  Conjunctive clear with " no hemorrhages or effusions  Oropharynx:  No ulcers, no lesions  Neck:  Supple, no lymphadenopathy  Lungs:  Normal respiratory excursion, no accessory muscle use  Cardiac:  Regular rate and rhythm, extremities well perfused  Abdomen:  Soft, non-tender, non-distended  Extremities:  No peripheral cyanosis, clubbing, left foot wrap intact.  Wound photo show deep ulcer over heel with necrotic base, no surrounding cellulitis  Skin:  No rashes, no ulcers  Neurological:  Moves all four extremities spontaneously, sensation grossly intact    LABS, IMAGING, & OTHER STUDIES:  Lab Results:  I have personally reviewed pertinent labs.  Results from last 7 days   Lab Units 01/22/24  0547 01/20/24  0526 01/19/24  1246 01/18/24  1455   POTASSIUM mmol/L 5.7* 5.3 5.5* 5.3   CHLORIDE mmol/L 97 97 97 108   CO2 mmol/L 24 23 27 27   BUN mg/dL 72* 42* 63* 46*   CREATININE mg/dL 9.39* 6.28* 7.59* 5.76*   EGFR ml/min/1.73sq m 5 8 6 9   CALCIUM mg/dL 7.8* 7.8* 8.1* 8.1*   AST U/L 11*  --   --  10*   ALT U/L 9  --   --  7   ALK PHOS U/L 92  --   --  94     Results from last 7 days   Lab Units 01/22/24  0547 01/20/24  0526 01/19/24  1246   WBC Thousand/uL 4.32 4.46 4.64   HEMOGLOBIN g/dL 9.0* 9.5* 9.0*   PLATELETS Thousands/uL 97* 99* 102*     Results from last 7 days   Lab Units 01/18/24  1408   GRAM STAIN RESULT  No polys seen*  4+ Gram positive cocci in pairs*  4+ Gram negative rods*  2+ Gram positive cocci in clusters*   WOUND CULTURE  3+ Growth of Klebsiella oxytoca*  3+ Growth of Pseudomonas aeruginosa*  3+ Growth of       Imaging Studies:   I have personally reviewed the following radiographic images in PACS:  Xray left foot images reviewed personally no OM

## 2024-01-22 NOTE — PLAN OF CARE
Problem: INFECTION - ADULT  Goal: Absence or prevention of progression during hospitalization  Description: INTERVENTIONS:  - Assess and monitor for signs and symptoms of infection  - Monitor lab/diagnostic results  - Monitor all insertion sites, i.e. indwelling lines, tubes, and drains  - Monitor endotracheal if appropriate and nasal secretions for changes in amount and color  - [place] appropriate cooling/warming therapies per order  - Administer medications as ordered  - Instruct and encourage patient and family to use good hand hygiene technique  - Identify and instruct in appropriate isolation precautions for identified infection/condition  Outcome: Progressing     Problem: SAFETY ADULT  Goal: Maintains/Returns to pre admission functional level  Description: INTERVENTIONS:  - Perform AM-PAC 6 Click Basic Mobility/ Daily Activity assessment daily.  - Set and communicate daily mobility goal to care team and patient/family/caregiver.   - Collaborate with rehabilitation services on mobility goals if consulted    - Out of bed for toileting  - Record patient progress and toleration of activity level   Outcome: Progressing     Problem: SKIN/TISSUE INTEGRITY - ADULT  Goal: Incision(s), wounds(s) or drain site(s) healing without S/S of infection  Description: INTERVENTIONS  - Assess and document dressing, incision, wound bed, drain sites and surrounding tissue    Outcome: Progressing

## 2024-01-22 NOTE — PROGRESS NOTES
Progress Note - Vascular Surgery   Asad Galloway 63 y.o. male MRN: [id number]  Unit/Bed#: NW8 874-01 Encounter: [id number]    Assessment:  Mr. Galloway is a 64 yo male with LLE CLTI/nonhealing left heel ulcer with LLE agram, AT and distal PT POBA 10/11/23.     Plan:  - Follow up MRI foot to eval for osteomyelitis  - Appreciate Podiatry recommendations  - Recommend repeat LLE agram/psb intervention  - Patient/family deciding whether to pursue further treatment  - Continue IV abx  - Pain and nausea control PRN  - Encourage IS, OOB   - DVT ppx     Subjective:  No acute events overnight. Denies pain this morning, denies additional complaints.     Objective:    Vitals:   Temp:  [97.9 °F (36.6 °C)-98.4 °F (36.9 °C)] 97.9 °F (36.6 °C)  HR:  [64-68] 64  Resp:  [16-18] 18  BP: (101-142)/(46-74) 101/46  Body mass index is 33.73 kg/m².    Physical Exam:   Gen: NAD, Resting in bed  Neuro: No focal deficits  Head: Normal Cephalic, Atraumatic  CV: Regular rate  Pulm: Normal work of breathing, No respiratory distress on RA  Ext: No edema bilateral lower extremities, Non-tender. Left lower extremity with doppler signals PT and DP    I/O:  No intake or output data in the 24 hours ending 01/22/24 0707    Lab Results:  Recent Labs     01/20/24  0526 01/22/24  0547   WBC 4.46 4.32   HGB 9.5* 9.0*   PLT 99* 97*   SODIUM 133* 135   K 5.3 5.7*   CL 97 97   CO2 23 24   BUN 42* 72*   CREATININE 6.28* 9.39*   GLUC 100 102   CALCIUM 7.8* 7.8*   AST  --  11*   ALT  --  9   ALKPHOS  --  92   TBILI  --  0.44     ---    Ivy Murphy MD  General Surgery PGY-I

## 2024-01-22 NOTE — PROGRESS NOTES
Dr. Mortensen with [facility] made aware pt's son states he takes Depakote 125 mg BID, not 250 mg BID. Awaiting new order. Will continue to monitor.

## 2024-01-22 NOTE — ASSESSMENT & PLAN NOTE
Per nursing staff patient slid out of wheelchair when attempting transport down to dialysis, 1/19. This was witnessed by both transport staff and wife. Pt did not have head strike, no significant fall, slid to the floor. He was able to get back into bed.   Pt without any upper extremity pain, no evidence of significant bruising, full ROM of the lower extremities bilaterally. Only complaint of pain in the left heel where wound is noted   No indication for any imaging currently  PT/OT consultations not yet seen  Wife and son requesting pt to return home when cleared by ortho and podiatry

## 2024-01-23 ENCOUNTER — APPOINTMENT (OUTPATIENT)
Dept: RADIOLOGY | Facility: HOSPITAL | Age: 64
DRG: 638 | End: 2024-01-23
Payer: MEDICARE

## 2024-01-23 VITALS
RESPIRATION RATE: 24 BRPM | SYSTOLIC BLOOD PRESSURE: 129 MMHG | HEART RATE: 88 BPM | DIASTOLIC BLOOD PRESSURE: 89 MMHG | TEMPERATURE: 98.7 F

## 2024-01-23 LAB
ANION GAP SERPL CALCULATED.3IONS-SCNC: 11 MMOL/L
BUN SERPL-MCNC: 57 MG/DL (ref 5–25)
CALCIUM SERPL-MCNC: 8.4 MG/DL (ref 8.4–10.2)
CHLORIDE SERPL-SCNC: 98 MMOL/L (ref 96–108)
CO2 SERPL-SCNC: 24 MMOL/L (ref 21–32)
CREAT SERPL-MCNC: 7.75 MG/DL (ref 0.6–1.3)
ERYTHROCYTE [DISTWIDTH] IN BLOOD BY AUTOMATED COUNT: 16 % (ref 11.6–15.1)
GFR SERPL CREATININE-BSD FRML MDRD: 6 ML/MIN/1.73SQ M
GLUCOSE SERPL-MCNC: 109 MG/DL (ref 65–140)
GLUCOSE SERPL-MCNC: 128 MG/DL (ref 65–140)
GLUCOSE SERPL-MCNC: 134 MG/DL (ref 65–140)
GLUCOSE SERPL-MCNC: 147 MG/DL (ref 65–140)
GLUCOSE SERPL-MCNC: 85 MG/DL (ref 65–140)
HCT VFR BLD AUTO: 30.1 % (ref 36.5–49.3)
HGB BLD-MCNC: 9.6 G/DL (ref 12–17)
MAGNESIUM SERPL-MCNC: 2.1 MG/DL (ref 1.9–2.7)
MCH RBC QN AUTO: 31 PG (ref 26.8–34.3)
MCHC RBC AUTO-ENTMCNC: 31.9 G/DL (ref 31.4–37.4)
MCV RBC AUTO: 97 FL (ref 82–98)
PLATELET # BLD AUTO: 99 THOUSANDS/UL (ref 149–390)
PMV BLD AUTO: 11.5 FL (ref 8.9–12.7)
POTASSIUM SERPL-SCNC: 6 MMOL/L (ref 3.5–5.3)
RBC # BLD AUTO: 3.1 MILLION/UL (ref 3.88–5.62)
SODIUM SERPL-SCNC: 133 MMOL/L (ref 135–147)
WBC # BLD AUTO: 4.09 THOUSAND/UL (ref 4.31–10.16)

## 2024-01-23 PROCEDURE — 82948 REAGENT STRIP/BLOOD GLUCOSE: CPT

## 2024-01-23 PROCEDURE — G1004 CDSM NDSC: HCPCS

## 2024-01-23 PROCEDURE — NC001 PR NO CHARGE: Performed by: SURGERY

## 2024-01-23 PROCEDURE — 99232 SBSQ HOSP IP/OBS MODERATE 35: CPT | Performed by: PODIATRIST

## 2024-01-23 PROCEDURE — 85027 COMPLETE CBC AUTOMATED: CPT | Performed by: STUDENT IN AN ORGANIZED HEALTH CARE EDUCATION/TRAINING PROGRAM

## 2024-01-23 PROCEDURE — 73721 MRI JNT OF LWR EXTRE W/O DYE: CPT

## 2024-01-23 PROCEDURE — 83735 ASSAY OF MAGNESIUM: CPT | Performed by: STUDENT IN AN ORGANIZED HEALTH CARE EDUCATION/TRAINING PROGRAM

## 2024-01-23 PROCEDURE — 80048 BASIC METABOLIC PNL TOTAL CA: CPT | Performed by: STUDENT IN AN ORGANIZED HEALTH CARE EDUCATION/TRAINING PROGRAM

## 2024-01-23 PROCEDURE — 99232 SBSQ HOSP IP/OBS MODERATE 35: CPT | Performed by: INTERNAL MEDICINE

## 2024-01-23 PROCEDURE — 99232 SBSQ HOSP IP/OBS MODERATE 35: CPT | Performed by: PHYSICIAN ASSISTANT

## 2024-01-23 RX ORDER — HYDROMORPHONE HCL/PF 1 MG/ML
0.5 SYRINGE (ML) INJECTION ONCE AS NEEDED
Status: COMPLETED | OUTPATIENT
Start: 2024-01-23 | End: 2024-01-23

## 2024-01-23 RX ADMIN — ASPIRIN 81 MG: 81 TABLET, COATED ORAL at 09:53

## 2024-01-23 RX ADMIN — DAKIN'S SOLUTION 0.125% (QUARTER STRENGTH): 0.12 SOLUTION at 09:53

## 2024-01-23 RX ADMIN — HEPARIN SODIUM 5000 UNITS: 5000 INJECTION INTRAVENOUS; SUBCUTANEOUS at 22:34

## 2024-01-23 RX ADMIN — METOPROLOL SUCCINATE 50 MG: 50 TABLET, EXTENDED RELEASE ORAL at 09:53

## 2024-01-23 RX ADMIN — ATORVASTATIN CALCIUM 40 MG: 40 TABLET, FILM COATED ORAL at 16:00

## 2024-01-23 RX ADMIN — PRASUGREL 5 MG: 10 TABLET, FILM COATED ORAL at 09:53

## 2024-01-23 RX ADMIN — DIVALPROEX SODIUM 125 MG: 125 CAPSULE, COATED PELLETS ORAL at 20:41

## 2024-01-23 RX ADMIN — HEPARIN SODIUM 5000 UNITS: 5000 INJECTION INTRAVENOUS; SUBCUTANEOUS at 05:33

## 2024-01-23 RX ADMIN — DIVALPROEX SODIUM 125 MG: 125 CAPSULE, COATED PELLETS ORAL at 09:52

## 2024-01-23 RX ADMIN — HEPARIN SODIUM 5000 UNITS: 5000 INJECTION INTRAVENOUS; SUBCUTANEOUS at 14:00

## 2024-01-23 RX ADMIN — CEFEPIME 1000 MG: 1 INJECTION, POWDER, FOR SOLUTION INTRAMUSCULAR; INTRAVENOUS at 15:00

## 2024-01-23 RX ADMIN — HYDROMORPHONE HYDROCHLORIDE 0.5 MG: 1 INJECTION, SOLUTION INTRAMUSCULAR; INTRAVENOUS; SUBCUTANEOUS at 20:41

## 2024-01-23 RX ADMIN — METOPROLOL SUCCINATE 50 MG: 50 TABLET, EXTENDED RELEASE ORAL at 20:41

## 2024-01-23 NOTE — ASSESSMENT & PLAN NOTE
Patient presented with chronic nonhealing ulcer of the left heel, positive probe to bone concerning for clinical osteomyelitis. Sent in from podiatry office outpatient for direct admission   Patient afebrile, no leukocytosis and hemodynamically stable  Vascular surgery following,  S/p LEADS with diffuse disease but no evidence of significant stenosis or occlusion, toe pressures below healing range  No plans for surgical intervention at this time, son declining angiogram at this time  Podiatry following,  WBAT left foot with surgical shoe  MRI pending   Wound culture growing Pseudomonas and Klebsiella. ID following, continue IV cefepime for now

## 2024-01-23 NOTE — ASSESSMENT & PLAN NOTE
Lab Results   Component Value Date    EGFR 6 01/23/2024    EGFR 5 01/22/2024    EGFR 8 01/20/2024    CREATININE 7.75 (H) 01/23/2024    CREATININE 9.39 (H) 01/22/2024    CREATININE 6.28 (H) 01/20/2024     On hemodialysis, MWF schedule  Nephrology following,  Continue  HD    Monitor labs

## 2024-01-23 NOTE — PROGRESS NOTES
Progress Note - Infectious Disease   Asad Galloway 63 y.o. male MRN: [id number]  Unit/Bed#: NW8 874-01 Encounter: [id number]      Impression/Recommendations:  Chronic left heel ulcer.    In setting of DM, PAD.  Consider osteomyelitis given PTB per Podiatry.  X-ray negative.  MRI pending.  Wound culture with Pseudomonas, Klebseilla although may represent colonization as photos reveal minimal purulence, cellulitis.  Clinically stable without sepsis  Rec:  Continue cefepime for now  Check MRI to evaluate for osteomyelitis  LWC per Podiatry  Follow temperatures closely  Discussed with son - if OM confirmed, optimal treatment would include revascularization, bone debridement along with IV antibiotics.  IV antibiotics alone unlikely to be curative in this setting.     PAD.    Status post LLE A-gram, PTA 2023.  Recent LEADs show diffuse disease with low GTP.  Family has declined repeat A-gram.  Rec:  Vascular surgery follow-up ongoing     DM with DPN.    Risk factor for ulcer as above, infection.     ESRD on HD.  Rec:  Dose adjust antibiotics for HD  Nephrology follow-up ongoing     ICM.     I discussed with PAU Silverman with [facility]KEY the above plan to continue IV antibiotics and await MRI.  He agrees with the plan.     Antibiotics:  Cefepime #6    Subjective/24 Hour Events:  Patient seen on AM rounds.  Son at bedside helps provide independent history as patient is a poor historian.  Unable to complete MRI last night as patient panicked and thought was getting A-gram.  Will retry tonight.    Objective:  Vitals:  Temp:  [97.6 °F (36.4 °C)-98.9 °F (37.2 °C)] 98.4 °F (36.9 °C)  HR:  [64-68] 64  Resp:  [16-18] 16  BP: (122-148)/(63-78) 140/67  SpO2:  [95 %-98 %] 95 %  Temp (24hrs), Av.2 °F (36.8 °C), Min:97.6 °F (36.4 °C), Max:98.9 °F (37.2 °C)  Current: Temperature: 98.4 °F (36.9 °C)    Physical Exam:   General:  No acute distress  Psychiatric:  Awake and alert  Pulmonary:  Normal respiratory excursion without accessory muscle  use  Abdomen:  Soft, nontender  Extremities:  LLE dressing intact  Skin:  No rashes    Lab Results:  I have personally reviewed pertinent labs.  Results from last 7 days   Lab Units 01/23/24  0532 01/22/24  0547 01/20/24  0526 01/19/24  1246 01/18/24  1455   POTASSIUM mmol/L 6.0* 5.7* 5.3   < > 5.3   CHLORIDE mmol/L 98 97 97   < > 108   CO2 mmol/L 24 24 23   < > 27   BUN mg/dL 57* 72* 42*   < > 46*   CREATININE mg/dL 7.75* 9.39* 6.28*   < > 5.76*   EGFR ml/min/1.73sq m 6 5 8   < > 9   CALCIUM mg/dL 8.4 7.8* 7.8*   < > 8.1*   AST U/L  --  11*  --   --  10*   ALT U/L  --  9  --   --  7   ALK PHOS U/L  --  92  --   --  94    < > = values in this interval not displayed.     Results from last 7 days   Lab Units 01/23/24  0532 01/22/24  0547 01/20/24  0526   WBC Thousand/uL 4.09* 4.32 4.46   HEMOGLOBIN g/dL 9.6* 9.0* 9.5*   PLATELETS Thousands/uL 99* 97* 99*     Results from last 7 days   Lab Units 01/18/24  1408   GRAM STAIN RESULT  No polys seen*  4+ Gram positive cocci in pairs*  4+ Gram negative rods*  2+ Gram positive cocci in clusters*   WOUND CULTURE  3+ Growth of Klebsiella oxytoca*  3+ Growth of Pseudomonas aeruginosa*  3+ Growth of       Imaging Studies:   I have personally reviewed pertinent imaging study reports and images in PACS.    EKG, Pathology, and Other Studies:   I have personally reviewed pertinent reports.

## 2024-01-23 NOTE — PROGRESS NOTES
NEPHROLOGY PROGRESS NOTE   Asad Galloway 63 y.o. male MRN: [id number]  Unit/Bed#: NW8 874-01 Encounter: [id number]  Reason for Consult: ESRD      SUMMARY:    64 yo with PMH of ESRD on HD MWF, CVA, ICM, HTN, anemia, chronic left heel wound p/w nonhealing ulcer.  Nephrology is consulted for management of ESRD      Assessment/Plan:     End Stage Renal Disease  -Modality:In-Center Hemodialysis  -Schedule: Monday/Wednesday/Friday  -Dialysis Unit: [facility]  -Access: Left Arm AV Fistula  -Presciption: reviewed  -Status: Stable underwent hemodialysis yesterday.  Blood pressures were overall pretty stable during treatment  -Plan:   Continue current management plan of care discussed with the patient nurse and the patient's son  Plan for next dialysis tomorrow     Anemia  -Not on BAYLEE due to CVA history  -Hemoglobin is quite stable at 9.6.  Continue current management and monitor the hemoglobin and hematocrit     Mineral bone disorder-chronic kidney disease  - Renal diet  -not on Phos binders  -monitor Phos levels  -Continue current management    Hyponatremia  --Mild and stable monitor on dialysis     Blood pressure/volume status  -Examines euvolemic  -Plan for ultrafiltration tomorrow on dialysis    Hyperkalemia  -Potassium this morning noted to be 6 but is noted to be hemolyzed.  Plan for dialysis tomorrow.  -Underwent dialysis yesterday  -Continue low potassium diet      SUBJECTIVE / INTERVAL HISTORY:    No chest pain or shortness of breath.  Or tolerating oral intake well.    OBJECTIVE:  Current Weight: Weight - Scale: 94.8 kg (209 lb)  Vitals:    01/22/24 1500 01/22/24 2022 01/22/24 2300 01/23/24 0836   BP: 148/78 122/63 124/76 140/67   BP Location: Right arm Right arm Right arm Right arm   Pulse: 68 67 66 64   Resp: 18 16 18 16   Temp: 98 °F (36.7 °C) 97.6 °F (36.4 °C) 98.9 °F (37.2 °C) 98.4 °F (36.9 °C)   TempSrc: Oral Oral Oral Oral   SpO2: 98% 95%  95%   Weight:       Height:         No intake or output data in the  24 hours ending 01/23/24 1208    Review of Systems:    Constitutional: Negative for chills and fever.   HENT: Negative for ear pain and sore throat.    Eyes: Negative for pain and visual disturbance.   Respiratory: Negative for cough and shortness of breath.    Cardiovascular: Negative for chest pain and palpitations.   Gastrointestinal: Negative for abdominal pain and vomiting.   Genitourinary: Negative for dysuria and hematuria.   Musculoskeletal: Negative for arthralgias and back pain.   Skin: Negative for color change and rash.   Neurological: Negative for seizures and syncope.   12 point ROS has been reviewed.    Physical Exam  Vitals and nursing note reviewed.   Constitutional:       General: He is not in acute distress.     Appearance: He is well-developed.   HENT:      Head: Normocephalic and atraumatic.   Eyes:      General: No scleral icterus.     Conjunctiva/sclera: Conjunctivae normal.      Pupils: Pupils are equal, round, and reactive to light.   Cardiovascular:      Rate and Rhythm: Normal rate and regular rhythm.      Heart sounds: S1 normal and S2 normal. No murmur heard.     No friction rub. No gallop.   Pulmonary:      Effort: Pulmonary effort is normal. No respiratory distress.      Breath sounds: Normal breath sounds. No wheezing or rales.   Abdominal:      General: Bowel sounds are normal.      Palpations: Abdomen is soft.      Tenderness: There is no abdominal tenderness. There is no rebound.   Musculoskeletal:         General: Normal range of motion.      Cervical back: Normal range of motion and neck supple.   Skin:     Findings: No rash.   Neurological:      Mental Status: He is alert and oriented to person, place, and time.   Psychiatric:         Behavior: Behavior normal.         Medications:    Current Facility-Administered Medications:     acetaminophen (TYLENOL) tablet 650 mg, 650 mg, Oral, [facility], 650 mg at 01/22/24 0541    aspirin (ECOTRIN LOW STRENGTH) EC tablet 81  mg, 81 mg, Oral, Daily, Speedy Quintero MD, 81 mg at 01/23/24 0953    atorvastatin (LIPITOR) tablet 40 mg, 40 mg, Oral, Daily With Dinner, Speedy Quintero MD, 40 mg at 01/22/24 1804    cefepime (MAXIPIME) 1,000 mg in dextrose 5 % 50 mL IVPB, 1,000 mg, Intravenous, Q24H, Speedy Quintero MD, Last Rate: 100 mL/hr at 01/22/24 1442, 1,000 mg at 01/22/24 1442    divalproex sodium (DEPAKOTE SPRINKLE) capsule 125 mg, 125 mg, Oral, Q12H [facility], Edward Mortensen, 125 mg at 01/23/24 0952    heparin (porcine) subcutaneous injection 5,000 Units, 5,000 Units, Subcutaneous, Q8H [facility], Speedy Quintero MD, 5,000 Units at 01/23/24 0533    HYDROmorphone (DILAUDID) injection 0.5 mg, 0.5 mg, Intravenous, Once PRN, Tristan Grant, DPJULISA    insulin lispro (HumaLOG) 100 units/mL subcutaneous injection 1-6 Units, 1-6 Units, Subcutaneous, TID AC **AND** Fingerstick Glucose (POCT), , , TID AC, Speedy Quintero MD    LORazepam (ATIVAN) injection 0.5 mg, 0.5 mg, Intravenous, Once, Mary Turner PA-C    LORazepam (ATIVAN) injection 0.5 mg, 0.5 mg, Intravenous, Once PRN, Mary Turner PA-C    metoprolol succinate (TOPROL-XL) 24 hr tablet 50 mg, 50 mg, Oral, BID, Speedy uQintero MD, 50 mg at 01/23/24 0953    midodrine (PROAMATINE) tablet 10 mg, 10 mg, Oral, Before Dialysis, Joselyn Reyes Bahamonde, MD, 10 mg at 01/22/24 0758    ondansetron (ZOFRAN) injection 4 mg, 4 mg, Intravenous, Q6H PRN, Speedy Quintero MD, 4 mg at 01/22/24 0241    oxyCODONE (ROXICODONE) split tablet 2.5 mg, 2.5 mg, Oral, TID PRN, 2.5 mg at 01/20/24 1006 **OR** oxyCODONE (ROXICODONE) IR tablet 5 mg, 5 mg, Oral, TID PRN, Speedy Quintero MD, 5 mg at 01/22/24 0316    prasugrel (EFFIENT) tablet 5 mg, 5 mg, Oral, Daily, Speedy Quintero MD, 5 mg at 01/23/24 0953    sodium hypochlorite (DAKIN'S QUARTER-STRENGTH) 0.125 percent topical solution, , Irrigation, Daily, Denisse Betancourt DPM, Given at 01/23/24 0953    Laboratory Results:  Results from last 7 days   Lab Units 01/23/24  0532 01/22/24  0547 01/20/24  0562  01/19/24  1246 01/18/24  1455   WBC Thousand/uL 4.09* 4.32 4.46 4.64 4.38   HEMOGLOBIN g/dL 9.6* 9.0* 9.5* 9.0* 10.1*   HEMATOCRIT % 30.1* 28.3* 30.8* 29.0* 33.7*   PLATELETS Thousands/uL 99* 97* 99* 102* 99*   POTASSIUM mmol/L 6.0* 5.7* 5.3 5.5* 5.3   CHLORIDE mmol/L 98 97 97 97 108   CO2 mmol/L 24 24 23 27 27   BUN mg/dL 57* 72* 42* 63* 46*   CREATININE mg/dL 7.75* 9.39* 6.28* 7.59* 5.76*   CALCIUM mg/dL 8.4 7.8* 7.8* 8.1* 8.1*   MAGNESIUM mg/dL 2.1  --   --   --   --        PLEASE NOTE:  This encounter was completed utilizing the [employer] Direct Speech Voice Recognition Software. Grammatical errors, random word insertions, pronoun errors and incomplete sentences are occasional consequences of the system due to software limitations, ambient noise and hardware issues.These may be missed by proof reading prior to affixing electronic signature. Any questions or concerns about the content, text or information contained within the body of this dictation should be directly addressed to the physician for clarification. Please do not hesitate to call me directly if you have any any questions or concerns.

## 2024-01-23 NOTE — ASSESSMENT & PLAN NOTE
Most recent EF 40%.  Euvolemic on exam.  Continue Toprol XL 50 mg BID  Entresto was held pending vascular surgery plans and ?angiography, however patient's son currently declining angiogram as above  Volume management via dialysis

## 2024-01-23 NOTE — PROGRESS NOTES
"Podiatry - Progress Note  Patient: Asad Galloway 63 y.o. male   MRN: [id number]  PCP: Raj Auguste DO  Unit/Bed#: NW8 874-01 Encounter: [id number]  Date Of Visit: 24    ASSESSMENT:    Asad Galloway is a 63 y.o. male with:    Nonhealing left heel ulceration  T2DM  ESRD  PAD  Diabetic polyneuropathy  History of toe amputations  Medical noncompliance  Acute on chronic CHF      PLAN:    Will plan to follow-up MRI results of heel to rule out osteomyelitis.  Patient and family not amenable to proceeding with angiogram at this time. Discussed with vascular team   Will order ativan and dilaudid to assist with agitation and pain during MRI. Patient was unable to tolerated MRI last night. Patients son to accompany him today to MRI. Planned for 9:45pm  Will hold on replacing Vac   Abx per ID   Continue local wound care Lucasin's DSD left foot  Continue antibiotics per ID  Rest of care per primary team.     Weightbearing status:     SUBJECTIVE:     The patient was seen, evaluated, and assessed at bedside today. The patient was awake, alert, and in no acute distress. No acute events overnight. The patient reports addition of bedside today. Patient denies N/V/F/chills/SOB/CP.      OBJECTIVE:     Vitals:   /67 (BP Location: Right arm)   Pulse 64   Temp 98.4 °F (36.9 °C) (Oral)   Resp 16   Ht 5' 6\" (1.676 m)   Wt 94.8 kg (209 lb)   SpO2 95%   BMI 33.73 kg/m²     Temp (24hrs), Av.1 °F (36.7 °C), Min:97.6 °F (36.4 °C), Max:98.9 °F (37.2 °C)      Physical Exam:     Lungs: Non labored breathing  Abdomen: Soft, non-tender.  Lower Extremity:  Cardiovascular status at baseline from admission.  Neurological status at baseline from admission.  Musculoskeletal status at baseline from admission. No calf tenderness noted.       Wound #: 1  Location: posterior medial left heel  Length 2cm: Width 1cm: Depth 0.8cm:   Deepest Tissue Noted in Base: bone  Probe to Bone: Yes  Peripheral Skin Description: Attached  Granulation: " "10% Fibrotic Tissue: 80% Necrotic Tissue: 10%   Drainage Amount: minimal, serous  Signs of Infection: Yes  - malodor, positive probe to bone      Additional Data:     Labs:    Results from last 7 days   Lab Units 01/23/24  0532 01/22/24  0547   WBC Thousand/uL 4.09* 4.32   HEMOGLOBIN g/dL 9.6* 9.0*   HEMATOCRIT % 30.1* 28.3*   PLATELETS Thousands/uL 99* 97*   NEUTROS PCT %  --  62   LYMPHS PCT %  --  22   MONOS PCT %  --  11   EOS PCT %  --  3     Results from last 7 days   Lab Units 01/23/24  0532 01/22/24  0547   POTASSIUM mmol/L 6.0* 5.7*   CHLORIDE mmol/L 98 97   CO2 mmol/L 24 24   BUN mg/dL 57* 72*   CREATININE mg/dL 7.75* 9.39*   CALCIUM mg/dL 8.4 7.8*   ALK PHOS U/L  --  92   ALT U/L  --  9   AST U/L  --  11*           * I Have Reviewed All Lab Data Listed Above.    Recent Cultures (last 7 days):     Results from last 7 days   Lab Units 01/18/24  1408   GRAM STAIN RESULT  No polys seen*  4+ Gram positive cocci in pairs*  4+ Gram negative rods*  2+ Gram positive cocci in clusters*   WOUND CULTURE  3+ Growth of Klebsiella oxytoca*  3+ Growth of Pseudomonas aeruginosa*  3+ Growth of     Results from last 7 days   Lab Units 01/18/24  1408   ANAEROBIC CULTURE  4+ Growth of - Mixed aerobic and anaerobic bacteria with predominance of Bacteroides thetaiotaomicron group*       Imaging: I have personally reviewed pertinent films in PACS  EKG, Pathology, and Other Studies: I have personally reviewed pertinent reports.    ** Please Note: Portions of the record may have been created with voice recognition software. Occasional wrong word or \"sound a like\" substitutions may have occurred due to the inherent limitations of voice recognition software. Read the chart carefully and recognize, using context, where substitutions have occurred. **      "

## 2024-01-23 NOTE — PLAN OF CARE
Problem: PAIN - ADULT  Goal: Verbalizes/displays adequate comfort level or baseline comfort level  Description: Interventions:  - Encourage patient to monitor pain and request assistance  - Assess pain using appropriate pain scale  - Administer analgesics based on type and severity of pain and evaluate response  - Implement non-pharmacological measures as appropriate and evaluate response  - Consider cultural and social influences on pain and pain management  - Notify physician/advanced practitioner if interventions unsuccessful or patient reports new pain  Outcome: Progressing     Problem: INFECTION - ADULT  Goal: Absence or prevention of progression during hospitalization  Description: INTERVENTIONS:  - Assess and monitor for signs and symptoms of infection  - Monitor lab/diagnostic results  - Monitor all insertion sites, i.e. indwelling lines, tubes, and drains  - Monitor endotracheal if appropriate and nasal secretions for changes in amount and color  - Bennett appropriate cooling/warming therapies per order  - Administer medications as ordered  - Instruct and encourage patient and family to use good hand hygiene technique  - Identify and instruct in appropriate isolation precautions for identified infection/condition  Outcome: Progressing     Problem: SAFETY ADULT  Goal: Patient will remain free of falls  Description: INTERVENTIONS:  - Educate patient/family on patient safety including physical limitations  - Instruct patient to call for assistance with activity   - Consult OT/PT to assist with strengthening/mobility   - Keep Call bell within reach  - Keep bed low and locked with side rails adjusted as appropriate  - Keep care items and personal belongings within reach  - Initiate and maintain comfort rounds  - Make Fall Risk Sign visible to staff  - Offer Toileting every 2 Hours, in advance of need  - Initiate/Maintain bed/chair alarm  - Obtain necessary fall risk management equipment: slippers  - Apply  yellow socks and bracelet for high fall risk patients  - Consider moving patient to room near nurses station  Outcome: Progressing     Problem: DISCHARGE PLANNING  Goal: Discharge to home or other facility with appropriate resources  Description: INTERVENTIONS:  - Identify barriers to discharge w/patient and caregiver  - Arrange for needed discharge resources and transportation as appropriate  - Identify discharge learning needs (meds, wound care, etc.)  - Arrange for interpretive services to assist at discharge as needed  - Refer to Case Management Department for coordinating discharge planning if the patient needs post-hospital services based on physician/advanced practitioner order or complex needs related to functional status, cognitive ability, or social support system  Outcome: Progressing     Problem: Knowledge Deficit  Goal: Patient/family/caregiver demonstrates understanding of disease process, treatment plan, medications, and discharge instructions  Description: Complete learning assessment and assess knowledge base.  Interventions:  - Provide teaching at level of understanding  - Provide teaching via preferred learning methods  Outcome: Progressing     Problem: METABOLIC, FLUID AND ELECTROLYTES - ADULT  Goal: Electrolytes maintained within normal limits  Description: INTERVENTIONS:  - Monitor labs and assess patient for signs and symptoms of electrolyte imbalances  - Administer electrolyte replacement as ordered  - Monitor response to electrolyte replacements, including repeat lab results as appropriate  - Instruct patient on fluid and nutrition as appropriate  Outcome: Progressing

## 2024-01-23 NOTE — ASSESSMENT & PLAN NOTE
Lab Results   Component Value Date    HGBA1C 5.1 01/10/2024       Recent Labs     01/22/24  1708 01/22/24 2042 01/23/24  0633 01/23/24  1033   POCGLU 90 89 128 147*         Blood Sugar Average: Last 72 hrs:  (P) [id number]  Diet controlled at baseline   Sliding scale insulin with meals here   QID glucose checks   Monitor and adjust regimen as needed

## 2024-01-23 NOTE — ASSESSMENT & PLAN NOTE
Peripheral artery disease status post LLE a gram with balloon angioplasty of PT and AT 9/2023.  Presented with nonhealing left heel ulcer as above, from   Appreciate vascular surgery and podiatry recommendation  Son currently declining angiogram at this time   Continue aspirin and statin.

## 2024-01-23 NOTE — ASSESSMENT & PLAN NOTE
Per record review, patient slid out of wheelchair when attempting transport down to dialysis, 1/19. This was witnessed by both transport staff and wife. Per report, patient did not have head strike, no significant fall, slid to the floor. He was able to get back into bed.   PT/OT  Wife and son requesting patient return home when cleared

## 2024-01-23 NOTE — PROGRESS NOTES
[facility]  Progress Note  Name: Asad Galloway I  MRN: [id number]  Unit/Bed#: NW8 874-01 I Date of Admission: 1/18/2024   Date of Service: 1/23/2024 I Hospital Day: 5    Assessment/Plan   * Nonhealing ulcer of heel (HCC)  Assessment & Plan  Patient presented with chronic nonhealing ulcer of the left heel, positive probe to bone concerning for clinical osteomyelitis. Sent in from podiatry office outpatient for direct admission   Patient afebrile, no leukocytosis and hemodynamically stable  Vascular surgery following,  S/p LEADS with diffuse disease but no evidence of significant stenosis or occlusion, toe pressures below healing range  No plans for surgical intervention at this time, son declining angiogram at this time  Podiatry following,  WBAT left foot with surgical shoe  MRI pending   Wound culture growing Pseudomonas and Klebsiella. ID following, continue IV cefepime for now     PAD (peripheral artery disease) ([facility])  Assessment & Plan  Peripheral artery disease status post LLE a gram with balloon angioplasty of PT and AT 9/2023.  Presented with nonhealing left heel ulcer as above, [facility]  Son currently declining angiogram at this time   Continue aspirin and statin.    CAD, multiple vessel  Assessment & Plan  Stable.  Continue aspirin, prasugrel, metoprolol and statin.  Denies CP    ESRD on dialysis ([facility])  Assessment & Plan  Lab Results   Component Value Date    EGFR 6 01/23/2024    EGFR 5 01/22/2024    EGFR 8 01/20/2024    CREATININE 7.75 (H) 01/23/2024    CREATININE 9.39 (H) 01/22/2024    CREATININE 6.28 (H) 01/20/2024     On hemodialysis, MWF schedule  Nephrology following,  Continue  HD    Monitor labs    Ischemic cardiomyopathy  Assessment & Plan  Most recent EF 40%.  Euvolemic on exam.  Continue Toprol XL 50 mg BID  Entresto was held pending vascular surgery plans and ?angiography, however patient's son  currently declining angiogram as above  Volume management via dialysis    Type 2 diabetes mellitus with chronic kidney disease on chronic dialysis, without long-term current use of insulin (HCC)  Assessment & Plan  Lab Results   Component Value Date    HGBA1C 5.1 01/10/2024       Recent Labs     01/22/24  1708 01/22/24  2042 01/23/24  0633 01/23/24  1033   POCGLU 90 89 128 147*         Blood Sugar Average: Last 72 hrs:  (P) 122.8844852421442809  Diet controlled at baseline   Sliding scale insulin with meals here   QID glucose checks   Monitor and adjust regimen as needed    Hypotension  Assessment & Plan  BP overall fairly stable  Continue midodrine 2.5 mg PRN with dialysis.    Mood disorder (HCC)  Assessment & Plan  Continue home Depakote 250 mg BID    Anemia  Assessment & Plan  In the setting of end-stage renal disease.  Hemoglobin stable.  BAYLEE per nephrology.    Fall  Assessment & Plan  Per record review, patient slid out of wheelchair when attempting transport down to dialysis, 1/19. This was witnessed by both transport staff and wife. Per report, patient did not have head strike, no significant fall, slid to the floor. He was able to get back into bed.   PT/OT  Wife and son requesting patient return home when cleared     Mixed hyperlipidemia  Assessment & Plan  Continue home atorvastatin 40 mg daily.             VTE Pharmacologic Prophylaxis: VTE Score: 4 Moderate Risk (Score 3-4) - Pharmacological DVT Prophylaxis Ordered: heparin.    Mobility:   Basic Mobility Inpatient Raw Score: 9  JH-HLM Goal: 3: Sit at edge of bed  JH-HLM Achieved: 1: Laying in bed  HLM Goal NOT achieved. Continue with multidisciplinary rounding and encourage appropriate mobility to improve upon HLM goals.    Patient Centered Rounds: I performed bedside rounds with nursing staff today.   Discussions with Specialists or Other Care Team Provider: Podiatry and ID    Education and Discussions with Family / Patient: Updated  (son)  at bedside.    Total Time Spent on Date of Encounter in care of patient: 45 mins. This time was spent on one or more of the following: performing physical exam; counseling and coordination of care; obtaining or reviewing history; documenting in the medical record; reviewing/ordering tests, medications or procedures; communicating with other healthcare professionals and discussing with patient's family/caregivers.    Current Length of Stay: 5 day(s)  Current Patient Status: Inpatient   Certification Statement: The patient will continue to require additional inpatient hospital stay due to IV abx, MRI   Discharge Plan:  pending MRI, ID rec re IV abx, and further Podiatry and Vascular recs    Code Status: Level 1 - Full Code    Subjective:   Mr. Galloway did not tolerate MRI last night due to pain. Patient and son now agreeable to trying MRI again, son said he will go down with patient. Patient's son declining angiogram at this time     Objective:     Vitals:   Temp (24hrs), Av.3 °F (36.8 °C), Min:97.6 °F (36.4 °C), Max:98.9 °F (37.2 °C)    Temp:  [97.6 °F (36.4 °C)-98.9 °F (37.2 °C)] 98.2 °F (36.8 °C)  HR:  [64-67] 64  Resp:  [16-18] 18  BP: (122-141)/(63-76) 141/64  SpO2:  [95 %-98 %] 98 %  Body mass index is 33.73 kg/m².     Input and Output Summary (last 24 hours):   No intake or output data in the 24 hours ending 24 1605    Physical Exam:   Physical Exam  Vitals reviewed.   Constitutional:       Appearance: He is obese.      Comments: Patient seen lying in bed, son and Podiatry present   Cardiovascular:      Rate and Rhythm: Normal rate and regular rhythm.   Pulmonary:      Effort: Pulmonary effort is normal. No respiratory distress.      Breath sounds: Normal breath sounds.   Abdominal:      General: Bowel sounds are normal.      Palpations: Abdomen is soft.      Tenderness: There is no abdominal tenderness.   Musculoskeletal:      Comments: Left foot wrapped   Skin:     General: Skin is warm.    Neurological:      Mental Status: He is alert.   Psychiatric:      Comments: Tearful at end of encounter           Additional Data:     Labs:  Results from last 7 days   Lab Units 01/23/24  0532 01/22/24  0547   WBC Thousand/uL 4.09* 4.32   HEMOGLOBIN g/dL 9.6* 9.0*   HEMATOCRIT % 30.1* 28.3*   PLATELETS Thousands/uL 99* 97*   NEUTROS PCT %  --  62   LYMPHS PCT %  --  22   MONOS PCT %  --  11   EOS PCT %  --  3     Results from last 7 days   Lab Units 01/23/24  0532 01/22/24  0547   SODIUM mmol/L 133* 135   POTASSIUM mmol/L 6.0* 5.7*   CHLORIDE mmol/L 98 97   CO2 mmol/L 24 24   BUN mg/dL 57* 72*   CREATININE mg/dL 7.75* 9.39*   ANION GAP mmol/L 11 14   CALCIUM mg/dL 8.4 7.8*   ALBUMIN g/dL  --  3.7   TOTAL BILIRUBIN mg/dL  --  0.44   ALK PHOS U/L  --  92   ALT U/L  --  9   AST U/L  --  11*   GLUCOSE RANDOM mg/dL 85 102         Results from last 7 days   Lab Units 01/23/24  1033 01/23/24  0633 01/22/24  2042 01/22/24  1708 01/22/24  1227 01/21/24  2058 01/21/24  1724 01/21/24  1129 01/21/24  0848 01/20/24  2106 01/20/24  1646 01/20/24  1125   POC GLUCOSE mg/dl 147* 128 89 90 123 132 89 145* 120 123 145* 174*               Lines/Drains:  Invasive Devices       Peripheral Intravenous Line  Duration             Peripheral IV 01/18/24 Proximal;Right;Ventral (anterior) Forearm 5 days              Line  Duration             Hemodialysis AV Fistula Left Upper arm -- days                          Imaging: Reviewed radiology reports from this admission including: MRI left ankle    Recent Cultures (last 7 days):   Results from last 7 days   Lab Units 01/18/24  1408   GRAM STAIN RESULT  No polys seen*  4+ Gram positive cocci in pairs*  4+ Gram negative rods*  2+ Gram positive cocci in clusters*   WOUND CULTURE  3+ Growth of Klebsiella oxytoca*  3+ Growth of Pseudomonas aeruginosa*  3+ Growth of       Last 24 Hours Medication List:   Current Facility-Administered Medications   Medication Dose Route Frequency Provider Last  Rate    acetaminophen  650 mg Oral [facility]H [facility] Speedy Quintero MD      aspirin  81 mg Oral Daily Speedy Quintero MD      atorvastatin  40 mg Oral Daily With Dinner Speedy Quintero MD      cefepime  1,000 mg Intravenous Q24H Speedy Quintero MD 1,000 mg (01/22/24 1442)    divalproex sodium  125 mg Oral Q12H [facility] Edward Mortensen      heparin (porcine)  5,000 Units Subcutaneous Q8H [facility] Speedy Quintero MD      HYDROmorphone  0.5 mg Intravenous Once PRN Tristan Grant DPM      insulin lispro  1-6 Units Subcutaneous TID AC Speedy Quintero MD      LORazepam  0.5 mg Intravenous Once Mary Turner PA-C      LORazepam  0.5 mg Intravenous Once PRN Mary Turner PA-C      metoprolol succinate  50 mg Oral BID Speedy Quintero MD      midodrine  10 mg Oral Before Dialysis Joselyn Reyes Bahamonde, MD      ondansetron  4 mg Intravenous Q6H PRN Speedy Quintero MD      oxyCODONE  2.5 mg Oral TID PRN Speedy Quintero MD      Or    oxyCODONE  5 mg Oral TID PRN Speedy Quintero MD      prasugrel  5 mg Oral Daily Speedy Quintero MD      sodium hypochlorite   Irrigation Daily Denisse Betancourt DPM          Today, Patient Was Seen By: Bismark Adler PA-C    **Please Note: This note may have been constructed using a voice recognition system.**

## 2024-01-23 NOTE — PROGRESS NOTES
"[facility] Podiatry - Progress Note  Patient: Asad Galloway 63 y.o. male   MRN: [id number]  PCP: Raj Auguste DO  Unit/Bed#: NW8 874-01 Encounter: [id number]  Date Of Visit: 24    ASSESSMENT:    Asad Galloway is a 63 y.o. male with:    Nonhealing left heel ulceration  T2DM  ESRD  PAD  Diabetic polyneuropathy  History of toe amputations  Medical noncompliance  Acute on chronic CHF      PLAN:    Dressings changed at bedside  Reviewed left ankle/hindfoot MRI. ***  Vascular surgery has recommended a LLE Agram with possible intervention.  At the time, family was delaying until after MRI results.  Recommend follow-up with vascular to see if patient will reconsider left lower extremity Agram as currently his LEADs are below healing range with noncompressible PATSY  Continue IV Cefepime renal adjusted dosing per ID and pharmacy  Continue local wound care. Appreciate nursing assistance with dressing changes.  Elevation on green foam wedges or pillows when non-ambulatory.  Rest of care per primary team.    Weight bearing status: Weightbearing as tolerated to left foot in surgical shoe      SUBJECTIVE:     The patient was seen, evaluated, and assessed at bedside today. The patient was awake, alert, and in no acute distress. No acute events overnight. The patient reports ***. Patient denies N/V/F/chills/SOB/CP.      OBJECTIVE:     Vitals:   /76 (BP Location: Right arm)   Pulse 66   Temp 98.9 °F (37.2 °C) (Oral)   Resp 18   Ht 5' 6\" (1.676 m)   Wt 94.8 kg (209 lb)   SpO2 95%   BMI 33.73 kg/m²     Temp (24hrs), Av °F (36.7 °C), Min:97.6 °F (36.4 °C), Max:98.9 °F (37.2 °C)      Physical Exam:     General:  Alert, cooperative, and in no distress.  Lower extremity exam:  Cardiovascular status at baseline.  Neurological status at baseline.  Musculoskeletal status at baseline. No calf tenderness noted.     Lower extremity wound(s) as noted below:  Wound #: ***  Location: ***  Length ***cm: Width ***cm: Depth " Patient would like to proceed with Co2 vaporization of condyloma. Risks, benefits and possible complications including scar tissue, infection, new lesions reviewed and all questions answered. "***cm:   Deepest Tissue Noted in Base: ***  Probe to Bone: {YES/NO:31150}  Peripheral Skin Description: {wound edge:02019}  Granulation: ***% Fibrotic Tissue: ***% Necrotic Tissue: ***%   Drainage Amount: {drainage:36875}  Signs of Infection: {YES/NO:62904}    Clinical Images 01/23/24:  ***    Additional Data:     Labs:    Results from last 7 days   Lab Units 01/22/24  0547   WBC Thousand/uL 4.32   HEMOGLOBIN g/dL 9.0*   HEMATOCRIT % 28.3*   PLATELETS Thousands/uL 97*   NEUTROS PCT % 62   LYMPHS PCT % 22   MONOS PCT % 11   EOS PCT % 3     Results from last 7 days   Lab Units 01/22/24  0547   POTASSIUM mmol/L 5.7*   CHLORIDE mmol/L 97   CO2 mmol/L 24   BUN mg/dL 72*   CREATININE mg/dL 9.39*   CALCIUM mg/dL 7.8*   ALK PHOS U/L 92   ALT U/L 9   AST U/L 11*           * I Have Reviewed All Lab Data Listed Above.    Recent Cultures (last 7 days):     Results from last 7 days   Lab Units 01/18/24  1408   GRAM STAIN RESULT  No polys seen*  4+ Gram positive cocci in pairs*  4+ Gram negative rods*  2+ Gram positive cocci in clusters*   WOUND CULTURE  3+ Growth of Klebsiella oxytoca*  3+ Growth of Pseudomonas aeruginosa*  3+ Growth of     Results from last 7 days   Lab Units 01/18/24  1408   ANAEROBIC CULTURE  4+ Growth of - Mixed aerobic and anaerobic bacteria with predominance of Bacteroides thetaiotaomicron group*       Imaging: I have personally reviewed pertinent films in PACS  EKG, Pathology, and Other Studies: I have personally reviewed pertinent reports.      ** Please Note: Portions of the record may have been created with voice recognition software. Occasional wrong word or \"sound a like\" substitutions may have occurred due to the inherent limitations of voice recognition software. Read the chart carefully and recognize, using context, where substitutions have occurred. **      "

## 2024-01-24 ENCOUNTER — APPOINTMENT (INPATIENT)
Dept: DIALYSIS | Facility: HOSPITAL | Age: 64
DRG: 638 | End: 2024-01-24
Payer: MEDICARE

## 2024-01-24 VITALS
SYSTOLIC BLOOD PRESSURE: 134 MMHG | DIASTOLIC BLOOD PRESSURE: 64 MMHG | RESPIRATION RATE: 18 BRPM | WEIGHT: 209 LBS | TEMPERATURE: 99 F | OXYGEN SATURATION: 96 % | BODY MASS INDEX: 33.59 KG/M2 | HEIGHT: 66 IN | HEART RATE: 69 BPM

## 2024-01-24 LAB
ANION GAP SERPL CALCULATED.3IONS-SCNC: 15 MMOL/L
BUN SERPL-MCNC: 69 MG/DL (ref 5–25)
CALCIUM SERPL-MCNC: 8 MG/DL (ref 8.4–10.2)
CHLORIDE SERPL-SCNC: 102 MMOL/L (ref 96–108)
CO2 SERPL-SCNC: 20 MMOL/L (ref 21–32)
CREAT SERPL-MCNC: 8.72 MG/DL (ref 0.6–1.3)
ERYTHROCYTE [DISTWIDTH] IN BLOOD BY AUTOMATED COUNT: 16 % (ref 11.6–15.1)
GFR SERPL CREATININE-BSD FRML MDRD: 5 ML/MIN/1.73SQ M
GLUCOSE SERPL-MCNC: 81 MG/DL (ref 65–140)
GLUCOSE SERPL-MCNC: 85 MG/DL (ref 65–140)
GLUCOSE SERPL-MCNC: 87 MG/DL (ref 65–140)
HCT VFR BLD AUTO: 31.4 % (ref 36.5–49.3)
HGB BLD-MCNC: 10 G/DL (ref 12–17)
MCH RBC QN AUTO: 30.9 PG (ref 26.8–34.3)
MCHC RBC AUTO-ENTMCNC: 31.8 G/DL (ref 31.4–37.4)
MCV RBC AUTO: 97 FL (ref 82–98)
PLATELET # BLD AUTO: 95 THOUSANDS/UL (ref 149–390)
PMV BLD AUTO: 11.4 FL (ref 8.9–12.7)
POTASSIUM SERPL-SCNC: 6.1 MMOL/L (ref 3.5–5.3)
RBC # BLD AUTO: 3.24 MILLION/UL (ref 3.88–5.62)
SODIUM SERPL-SCNC: 137 MMOL/L (ref 135–147)
WBC # BLD AUTO: 3.86 THOUSAND/UL (ref 4.31–10.16)

## 2024-01-24 PROCEDURE — 90935 HEMODIALYSIS ONE EVALUATION: CPT | Performed by: INTERNAL MEDICINE

## 2024-01-24 PROCEDURE — 82948 REAGENT STRIP/BLOOD GLUCOSE: CPT

## 2024-01-24 PROCEDURE — 97605 NEG PRS WND THER DME<=50SQCM: CPT | Performed by: PODIATRIST

## 2024-01-24 PROCEDURE — 11042 DBRDMT SUBQ TIS 1ST 20SQCM/<: CPT | Performed by: PODIATRIST

## 2024-01-24 PROCEDURE — 80048 BASIC METABOLIC PNL TOTAL CA: CPT | Performed by: PHYSICIAN ASSISTANT

## 2024-01-24 PROCEDURE — 99239 HOSP IP/OBS DSCHRG MGMT >30: CPT | Performed by: PHYSICIAN ASSISTANT

## 2024-01-24 PROCEDURE — 85027 COMPLETE CBC AUTOMATED: CPT | Performed by: PHYSICIAN ASSISTANT

## 2024-01-24 RX ORDER — LIDOCAINE 40 MG/G
CREAM TOPICAL ONCE
Status: COMPLETED | OUTPATIENT
Start: 2024-01-24 | End: 2024-01-24

## 2024-01-24 RX ADMIN — PRASUGREL 5 MG: 10 TABLET, FILM COATED ORAL at 08:47

## 2024-01-24 RX ADMIN — METOPROLOL SUCCINATE 50 MG: 50 TABLET, EXTENDED RELEASE ORAL at 08:25

## 2024-01-24 RX ADMIN — LIDOCAINE 4%: 4 CREAM TOPICAL at 17:23

## 2024-01-24 RX ADMIN — MIDODRINE HYDROCHLORIDE 10 MG: 5 TABLET ORAL at 09:49

## 2024-01-24 RX ADMIN — DAKIN'S SOLUTION 0.125% (QUARTER STRENGTH): 0.12 SOLUTION at 08:29

## 2024-01-24 RX ADMIN — ASPIRIN 81 MG: 81 TABLET, COATED ORAL at 08:26

## 2024-01-24 RX ADMIN — ACETAMINOPHEN 650 MG: 325 TABLET, FILM COATED ORAL at 17:19

## 2024-01-24 RX ADMIN — HEPARIN SODIUM 5000 UNITS: 5000 INJECTION INTRAVENOUS; SUBCUTANEOUS at 06:51

## 2024-01-24 RX ADMIN — ATORVASTATIN CALCIUM 40 MG: 40 TABLET, FILM COATED ORAL at 17:24

## 2024-01-24 RX ADMIN — CEFEPIME 1000 MG: 1 INJECTION, POWDER, FOR SOLUTION INTRAMUSCULAR; INTRAVENOUS at 15:00

## 2024-01-24 RX ADMIN — DIVALPROEX SODIUM 125 MG: 125 CAPSULE, COATED PELLETS ORAL at 08:28

## 2024-01-24 NOTE — ASSESSMENT & PLAN NOTE
Lab Results   Component Value Date    HGBA1C 5.1 01/10/2024       Recent Labs     01/23/24  1033 01/23/24  1634 01/23/24 2050 01/24/24  1335   POCGLU 147* 134 109 81         Blood Sugar Average: Last 72 hrs:  (P) 115.7494369014187728  Diet controlled at baseline

## 2024-01-24 NOTE — ASSESSMENT & PLAN NOTE
Per record review, patient slid out of wheelchair when attempting transport down to dialysis, 1/19. This was witnessed by both transport staff and wife. Per report, patient did not have head strike, no significant fall, slid to the floor. He was able to get back into bed.   Patient's son is refusing rehab on discharge

## 2024-01-24 NOTE — PLAN OF CARE
Plan 4 hrs of HD on  a 2 k bath for a serum K of 6.1 today. To UF 1 kg as jose miguel  Post-Dialysis RN Treatment Note    Blood Pressure:  Pre 136/70 mm/Hg  Post 152/86 mmHg   EDW  95.5 kg    Weight:  Pre 95.5 kg   Post 94.6 kg   Mode of weight measurement: Bed Scale   Volume Removed  700 ml    Treatment duration  3 hrs 15 minutes   NS given  No    Treatment shortened? patient requesting to come off 45 minutes early , Dr Hunt informed and okayed   Medications given during Rx midodrine 10 mg   Estimated Kt/V  .95   Access type: AV fistula   Access Issues: No    Report called to primary nurse   Yes    Janeth Hodge RN  Problem: METABOLIC, FLUID AND ELECTROLYTES - ADULT  Goal: Electrolytes maintained within normal limits  Description: INTERVENTIONS:  - Monitor labs and assess patient for signs and symptoms of electrolyte imbalances  - Administer electrolyte replacement as ordered  - Monitor response to electrolyte replacements, including repeat lab results as appropriate  - Instruct patient on fluid and nutrition as appropriate  Outcome: Progressing  Goal: Fluid balance maintained  Description: INTERVENTIONS:  - Monitor labs   - Monitor I/O and WT  - Instruct patient on fluid and nutrition as appropriate  - Assess for signs & symptoms of volume excess or deficit  Outcome: Progressing  Goal: Glucose maintained within target range  Description: INTERVENTIONS:  - Monitor Blood Glucose as ordered  - Assess for signs and symptoms of hyperglycemia and hypoglycemia  - Administer ordered medications to maintain glucose within target range  - Assess nutritional intake and initiate nutrition service referral as needed  Outcome: Progressing

## 2024-01-24 NOTE — PROCEDURES
NEPHROLOGY DIALYSIS PROCEDURE NOTE      Patient seen and examined on hemodialysis, tolerating procedure well. All documentation, labs, medications were reviewed by myself, and the treatment plan was reviewed with nurse and patient.     Seen on Dialysis at : 10:29 AM  Dialysis Access: Left Arm AV Fistula  Vitals: 129/65  Dialysis time: 3.5 hours  Dialyzer: F180  Sodium bath: 138  Potassium bath: 2 K+  Bicarbonate bath: 33  Calcium bath: 2.5  Ultrafiltration: Aim for EDW  Blood flow rate: 400 cc/min  Dialysis flow rate: 1.5 X Qb  Dialysis temperature: 35.5C  Medications given on HD: midodrine 10 mg      Summary:    62 yo with PMH of ESRD on HD MWF, CVA, ICM, HTN, anemia, chronic left heel wound p/w nonhealing ulcer.  Nephrology is consulted for management of ESRD      Assessment/Plan:     End Stage Renal Disease  -Modality:In-Center Hemodialysis  -Schedule: Monday/Wednesday/Friday  -Dialysis Unit: [facility]  -Access: Left Arm AV Fistula  -Presciption: reviewed  -Status: Stable from a dialysis standpoint blood pressure is currently normotensive.  -Plan:   Seen on dialysis today.  Continue current management.  Plan for next dialysis on Friday.  Otherwise stable from renal standpoint for discharge when deemed stable from a medical standpoint.     Anemia  -Not on BAYLEE due to CVA history  -Hemoglobin is stable at 10 and at target.  Continue current management.     Mineral bone disorder-chronic kidney disease  - Renal diet  -not on Phos binders  -monitor Phos levels  -Continue current management.     Hyponatremia--resolved     Blood pressure/volume status  -Examines euvolemic and not too far off his dry weight continue to ultrafiltrate to his dry weight     Hyperkalemia  -Potassium noted to be 6.1 but is hemolyzed  -We are currently dialyzing on 2K bath    Review of Systems: The entire 12 point ROS has been reviewed.    Physical Exam:    General: Awake, no acute distress  Skin: No rashes no lesions  CVS: S1-S2 appreciated  regular rate and rhythm  Lungs: Clear to auscultation  Abdomen: Nontender nondistended  Access: Audible bruit and palpable thrill  Extremities: No edema  Neuro: No asterixis, left facial droop which is chronic          Current Facility-Administered Medications:     acetaminophen (TYLENOL) tablet 650 mg, 650 mg, Oral, Q8H [facility], Speedy Quintero MD, 650 mg at 01/22/24 0541    aspirin (ECOTRIN LOW STRENGTH) EC tablet 81 mg, 81 mg, Oral, Daily, Speedy Quintero MD, 81 mg at 01/24/24 0826    atorvastatin (LIPITOR) tablet 40 mg, 40 mg, Oral, Daily With Dinner, Speedy Quintero MD, 40 mg at 01/23/24 1600    cefepime (MAXIPIME) 1,000 mg in dextrose 5 % 50 mL IVPB, 1,000 mg, Intravenous, Q24H, Speedy Quintero MD, Last Rate: 100 mL/hr at 01/23/24 1500, 1,000 mg at 01/23/24 1500    divalproex sodium (DEPAKOTE SPRINKLE) capsule 125 mg, 125 mg, Oral, Q12H [facility], Edward Mortensen, 125 mg at 01/24/24 0828    heparin (porcine) subcutaneous injection 5,000 Units, 5,000 Units, Subcutaneous, Q8H [facility], Speedy Quintero MD, 5,000 Units at 01/24/24 0651    insulin lispro (HumaLOG) 100 units/mL subcutaneous injection 1-6 Units, 1-6 Units, Subcutaneous, TID AC **AND** Fingerstick Glucose (POCT), , , TID AC, Speedy Quintero MD    LORazepam (ATIVAN) injection 0.5 mg, 0.5 mg, Intravenous, Once, Mary Turner PA-C    LORazepam (ATIVAN) injection 0.5 mg, 0.5 mg, Intravenous, Once PRN, Mary Turner PA-C    metoprolol succinate (TOPROL-XL) 24 hr tablet 50 mg, 50 mg, Oral, BID, Speedy Quintero MD, 50 mg at 01/24/24 0825    midodrine (PROAMATINE) tablet 10 mg, 10 mg, Oral, Before Dialysis, Joselyn Reyes Bahamonde, MD, 10 mg at 01/24/24 0949    ondansetron (ZOFRAN) injection 4 mg, 4 mg, Intravenous, Q6H PRN, Speedy Quintero MD, 4 mg at 01/22/24 0241    oxyCODONE (ROXICODONE) split tablet 2.5 mg, 2.5 mg, Oral, TID PRN, 2.5 mg at 01/20/24 1006 **OR** oxyCODONE (ROXICODONE) IR tablet 5 mg, 5 mg, Oral, TID PRN, Speedy Quintero MD, 5 mg at 01/22/24 0316    prasugrel (EFFIENT) tablet 5  mg, 5 mg, Oral, Daily, Speedy Quintero MD, 5 mg at 01/24/24 0847    sodium hypochlorite (DAKIN'S QUARTER-STRENGTH) 0.125 percent topical solution, , Irrigation, Daily, Denisse Betancourt DPM, Given at 01/24/24 0850

## 2024-01-24 NOTE — PROGRESS NOTES
[facility] Podiatry - Progress Note  Patient: Asad Galloway 63 y.o. male   MRN: [id number]  PCP: Raj Auguste DO  Unit/Bed#: NW8 874-01 Encounter: [id number]  Date Of Visit: 24    ASSESSMENT:    Asad Galloway is a 63 y.o. male with:    Nonhealing left heel ulceration  T2DM  ESRD  PAD  Diabetic polyneuropathy  History of toe amputations  Medical noncompliance  Acute on chronic CHF      PLAN:    Patient is stable for discharge from a podiatry standpoint with close outpatient follow up. Recommend local wound care at this time  Performed bedside debridement of left heel wound to level that patient could tolerate, see procedure note below.  Per Dr. Church can begin wound vac therapy with home nursing and follow up in Wound Care. Home wound vac was applied today at bedside  Patient now attempted two left rearfoot/hindfoot MRI studies. Both MRIs have been nondiagnostic given patient motion and inability to tolerate MRI.   Vascular surgery has recommended a LLE Agram with possible intervention.  At the time, family is not amenable to proceeding with vascular intervention despite LEADs below healing range with noncompressible PATSY  Appreciate ID recommendations: If patient has definitive osteomyelitis, optimal treatment is revascularization, bone debridement, IV antibiotics.  However, IV antibiotics alone would not be curative.  Continue local wound care with close outpatient follow-up as alternative given no definitive osteomyelitis  Patient remains high risk for limb loss  Continue IV Cefepime renal adjusted dosing per ID and pharmacy  Continue local wound care of Dakins, DSD. Appreciate nursing assistance with dressing changes.  Elevation on green foam wedges or pillows when non-ambulatory.  Rest of care per primary team.    Weight bearing status: Weightbearing as tolerated to left foot in surgical shoe    Wound VAC application  1. Number of sponges: 2  2. Pressure settin continuous  3. Size of wound: 2.0cm x  "1.2cm x 0.8cm  4. Description of wound: mostly fibrotic, minimal granulaton    Debridement Procedure:    After  Verbal Consent was obtained and time out performed. Wound located left heel was  excisionally Surgical- Subcutaneous  (CPT: 69398) debrided using scapel. Pre-debridement wound measures 2 cm x 1 cm x 0.8 cm.  Pain was controlled by  topical lidocaine . Post debridement measurement 2 cm x 1.2 cm x 0.8 cm with wound appearing  fibrotic . 50%  of wound debrided. Tissue debrided includes depth of Epidermis, Dermis, and Subcutaneous with devitalized tissue being debrided including Hyperkeratosis and Slough.A small amount of bleeding was noted during procedure. Hemostasis was achieved using pressure. Pain noted during procedure rated as 4. Pain noted after procedure rated as 0. Procedure was Well tolerated by patient.      SUBJECTIVE:     The patient was seen, evaluated, and assessed at bedside today. The patient was awake, alert, and in no acute distress. No acute events overnight. The patient reports mild pain in his left foot at this time. He is ready to go home and is anxious when in the hospital. Patient denies N/V/F/chills/SOB/CP.      OBJECTIVE:     Vitals:   /81 (BP Location: Right arm)   Pulse 64   Temp 98.4 °F (36.9 °C) (Oral)   Resp 21   Ht 5' 6\" (1.676 m)   Wt 94.8 kg (209 lb)   SpO2 98%   BMI 33.73 kg/m²     Temp (24hrs), Av.1 °F (36.7 °C), Min:97.5 °F (36.4 °C), Max:98.4 °F (36.9 °C)      Physical Exam:     General:  Alert, cooperative, and in no distress.  Lower extremity exam:  Cardiovascular status at baseline.  Neurological status at baseline.  Musculoskeletal status at baseline. No calf tenderness noted.     Left lower extremity:   Wound #: 1  Location: left heel  Length 2cm: Width 1.2cm: Depth 0.8cm:   Deepest Tissue Noted in Base: periosteum  Probe to Bone: Yes  Peripheral Skin Description: Attached  Granulation: 20% Fibrotic Tissue: 80% Necrotic Tissue: 0%   Drainage Amount: " "minimal, serous  Signs of Infection: No    Clinical Images 01/24/24:      Additional Data:     Labs:    Results from last 7 days   Lab Units 01/24/24  0457 01/23/24  0532 01/22/24  0547   WBC Thousand/uL 3.86*   < > 4.32   HEMOGLOBIN g/dL 10.0*   < > 9.0*   HEMATOCRIT % 31.4*   < > 28.3*   PLATELETS Thousands/uL 95*   < > 97*   NEUTROS PCT %  --   --  62   LYMPHS PCT %  --   --  22   MONOS PCT %  --   --  11   EOS PCT %  --   --  3    < > = values in this interval not displayed.     Results from last 7 days   Lab Units 01/24/24  0457 01/23/24  0532 01/22/24  0547   POTASSIUM mmol/L 6.1*   < > 5.7*   CHLORIDE mmol/L 102   < > 97   CO2 mmol/L 20*   < > 24   BUN mg/dL 69*   < > 72*   CREATININE mg/dL 8.72*   < > 9.39*   CALCIUM mg/dL 8.0*   < > 7.8*   ALK PHOS U/L  --   --  92   ALT U/L  --   --  9   AST U/L  --   --  11*    < > = values in this interval not displayed.           * I Have Reviewed All Lab Data Listed Above.    Recent Cultures (last 7 days):     Results from last 7 days   Lab Units 01/18/24  1408   GRAM STAIN RESULT  No polys seen*  4+ Gram positive cocci in pairs*  4+ Gram negative rods*  2+ Gram positive cocci in clusters*   WOUND CULTURE  3+ Growth of Klebsiella oxytoca*  3+ Growth of Pseudomonas aeruginosa*  3+ Growth of     Results from last 7 days   Lab Units 01/18/24  1408   ANAEROBIC CULTURE  4+ Growth of - Mixed aerobic and anaerobic bacteria with predominance of Bacteroides thetaiotaomicron group*       Imaging: I have personally reviewed pertinent films in PACS  EKG, Pathology, and Other Studies: I have personally reviewed pertinent reports.      ** Please Note: Portions of the record may have been created with voice recognition software. Occasional wrong word or \"sound a like\" substitutions may have occurred due to the inherent limitations of voice recognition software. Read the chart carefully and recognize, using context, where substitutions have occurred. **      "

## 2024-01-24 NOTE — PHYSICAL THERAPY NOTE
Physical Therapy Cancellation Note     01/24/24 0958   PT Last Visit   PT Visit Date 01/24/24   Note Type   Note type Cancelled Session   Cancel Reasons Patient off floor/hemodialysis         Attempted to see pt this date for PT treatment session. At this time, pt off floor for HD. Plan to see pt tomorrow 1/25/24 for PT treatment session.     Isidro Merino PT, DPT

## 2024-01-24 NOTE — DISCHARGE INSTR - OTHER ORDERS
Discharge Instructions - Podiatry    Weight Bearing Status: Please try to stay off the left heel as much as possible. Please only weightbearing for transfers                   Pain: Continue analgesics as needed    Follow-up appointment instructions: Please make an appointment within one week of discharge with Dr. Church at [place] [facility]. Contact sooner if any increase in pain, or signs of infection occur    Wound Care: Leave dressings clean, dry, and intact between professional dressing changes    Nursing Instructions: Please apply a wound vac dressing with the sponge fit directly to the left heel wound. Then cover with the adhesive dressing and the track pad. Ensure the wound vac is running at 125 mmHg continuous. Gauze and secure with Kerlix and tape. Please change dressing every Tuesday, Thursday, and Saturday.  Wound VAC was last applied on 1/24/2024

## 2024-01-24 NOTE — ASSESSMENT & PLAN NOTE
Peripheral artery disease status post LLE a gram with balloon angioplasty of PT and AT 9/2023.  Presented with nonhealing left heel ulcer as above, from   Appreciate vascular surgery and podiatry recommendation  Son currently declining angiogram at this time   Continue aspirin and statin.  Follow up with Vascular, Dr. Galvan

## 2024-01-24 NOTE — ASSESSMENT & PLAN NOTE
In the setting of end-stage renal disease.  Hemoglobin stable.  BAYLEE per nephrology.   Pulmonology Clinic Progress Note    Patient: Haven Barrera Date of Service: 3/25/2021   : 1957    63 year old female        HISTORY OF PRESENT ILLNESS     This is a 63 year old year-old female a past medical history of moderately severe COPD, hx of tobacco use, non-small cell lung cancer s/p resection of right upper lobe, HFrEF34%, moderate RALPH AHI 12, Atrial fibrillation on coumadin, who presents today for follow up visit.  CT scan of the chest 2018 revealed a 3 mm right middle lobe nodule.  A subsequent repeat low radiation lung cancer screening CT scan of 2019 showed stability of the 3 mm nodule, however, there was a new 9 mm solid, noncalcified right upper lobe lesion not seen previously.  FDG PET scan 2020 revealed stability of the 9 mm nodule, however has only trace uptake with a maximum SUV of 1.17.   The location of the lesion in the middle of the right upper lobe not accessible via CT guidance biopsy or navigational bronchoscopy. The patient underwent repeat surveillance CT scan , which suggests the right upper lobe lesion increased to 10-11 mm in size. She underwent evaluation with Cardiothoracic surgery for surgical resection. On 2020, she underwent robotic right upper lobe posterior S2 segementectomy with lymph node dissection, pathology report showed mucinous adenocarcinoma. Her post op stay was complicated by LUIS MANUEL, hyponatremia, hypotension and hyperkalemia requiring pressors, CVVH with crt peaking at 4.28, with eventual improvement in her UO and creatinine on discharge. She follows with Dr. Flynn from Heme-onc.    Since the surgery, pt states that overall she is doing well in terms of recovery and is now working full time. She does note continued dyspnea on exertion, improved since surgery, however not completely back to baseline. She feels winded after walking to the end of a hallway, requiring frequents stops to sit and rest to catch her breath. She denies  any wheezing, increase sputum production, fevers, chills. She states that her trelegy ellipta is working well for her, COPD symptoms stable and well controlled with current regimen.  Patient denies current smoking, she does have a history of tobacco use.      10 Point ROS completed and negative except as mentioned in the HPI  PMH, surgical, meds ,allergies reviewed and updated in the chart     PHYSICAL EXAM     Vital Signs:  weight is 94.9 kg. Her blood pressure is 130/80 and her pulse is 78. Her respiration is 16 and oxygen saturation is 96%.     General:  No acute distress, well nourished well groomed female   HEENT:  EOMI, no scleral icterus or conjunctival pallor  Pulmonary:  CTAB, no wheezing/crackles/rales, no respiratory distress, no accessory muscles used  Cardiovascular:  RRR, S1/S2 present, no peripheral edema  Musculoskeletal:  ROM grossly normal  Skin:  Warm and dry, no rashes/lesions/ecchymoses/jaundice  Neuro:   grossly intact, no gross motor deficits, ambulates with a cane  Psych:  Normal, full range of affect, mood and affect are appropriate. A/O x3.    LABS     HgbA1c Hemoglobin A1C (%)   Date Value   12/08/2020 5.3      LDL LDL (mg/dL)   Date Value   12/08/2020 77      Creatinine  Creatinine (mg/dL)   Date Value   12/08/2020 0.86      Hemoglobin HGB (g/dL)   Date Value   12/08/2020 12.1          ASSESSMENT AND PLAN     IMPRESSION:  1. 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 Continues to have TIWARI since procedure.   2. Moderate severe chronic obstructive pulmonary disease, PFT 6/2020  3.  Moderate sleep disordered breathing, not compliant with CPAP  4. Hx of tobacco use  5. HFrEF34% ECHO 6/2020    PLAN:  1. She will need an updated ECHO, currently scheduled for 4/8  2. Repeat O2 evaluation  3. Will need Updated set of PFTs to evaluate her new baseline post lung segmentectomy.    4. Repeat CT chest to evaluate for  changes.   5. She will follow up with me in approximately 3 weeks for reevaluation after all imaging/testing has been completed    Patient's medical conditions, proposed management plan, risks, benefits and alternatives were discussed with the patient in detail. The patient understands and is agreeable.    This patient was seen, examined and discussed with Dr. Sherice MD who agrees with the above.     Salma Zhou DO  PGY-I   3/25/2021 10:29 AM

## 2024-01-24 NOTE — ASSESSMENT & PLAN NOTE
Most recent EF 40%.  Euvolemic on exam.  Continue Toprol XL 50 mg BID  On Entresto  Volume management via dialysis

## 2024-01-24 NOTE — ASSESSMENT & PLAN NOTE
Lab Results   Component Value Date    EGFR 5 01/24/2024    EGFR 6 01/23/2024    EGFR 5 01/22/2024    CREATININE 8.72 (H) 01/24/2024    CREATININE 7.75 (H) 01/23/2024    CREATININE 9.39 (H) 01/22/2024     On hemodialysis, MWF schedule  Nephrology following,  Nephrology cleared the patient from their standpoint for discharge today

## 2024-01-24 NOTE — ASSESSMENT & PLAN NOTE
Patient presented with chronic nonhealing ulcer of the left heel, positive probe to bone concerning for clinical osteomyelitis. Sent in from podiatry office outpatient for direct admission   Patient afebrile, no leukocytosis and hemodynamically stable  Vascular surgery following,  S/p LEADS with diffuse disease but no evidence of significant stenosis or occlusion, toe pressures below healing range  No plans for surgical intervention at this time, son declining angiogram at this time. Recommend close outpatient follow up with Vascular Surgery, Dr. Galvan  Podiatry following,  WBAT left foot with surgical shoe  Patient did not tolerate MRI x2   Podiatry reapplying wound vac prior to discharge, then plan for close outpatient follow up with Podiatry, Dr. Church. Wound Care per Podiatry recs  Wound culture growing Pseudomonas and Klebsiella. ID following. Plan to discontinue IV abx at this time per ID

## 2024-01-24 NOTE — PROGRESS NOTES
Progress Note - Infectious Disease   Asad Galloway 63 y.o. male MRN: [id number]  Unit/Bed#: NW8 874-01 Encounter: [id number]      Impression/Recommendations:  Chronic left heel ulcer.    In setting of DM, PAD.  Consider osteomyelitis given PTB per Podiatry.  X-ray negative.  MRI nondiagnostic due to motion.  Wound culture with Pseudomonas, Klebseilla although may represent colonization as photos reveal minimal purulence, cellulitis.  Clinically stable without sepsis.  Status post 7 days IV cefepime.  Rec:  Continue cefepime for now  LWC per Podiatry  Follow temperatures closely  At this point, optimal treatment would include revascularization, bone debridement along with IV antibiotics.  If neither of these is an option, IV antibiotics alone would not have a role in this setting and could be stopped (has gotten 7 day course for soft tissue infection).  A reasonable alternative would be continued LWC with close outpatient follow-up realizing remains high risk for limb loss.     PAD.    Status post LLE A-gram, PTA 2023.  Recent LEADs show diffuse disease with low GTP.  Family has declined repeat A-gram although unclear if anything amenable to intervention.  Rec:  Vascular surgery follow-up ongoing     DM with DPN.    Risk factor for ulcer as above, infection.     ESRD on HD.  Rec:  Dose adjust antibiotics for HD  Nephrology follow-up ongoing     ICM.     I discussed with Dr. Betancourt the plan to discuss with family final options for wound care, revascularization which will affect antibiotic plan as above.  She agrees with this plain.     Antibiotics:  Cefepime #7    Subjective/24 Hour Events:  Patient seen on AM rounds.  At HD.  Robert.    Objective:  Vitals:  Temp:  [97.5 °F (36.4 °C)-98.4 °F (36.9 °C)] 98 °F (36.7 °C)  HR:  [60-70] 60  Resp:  [16-21] 18  BP: (128-148)/(56-95) 140/71  SpO2:  [96 %-98 %] 98 %  Temp (24hrs), Av °F (36.7 °C), Min:97.5 °F (36.4 °C), Max:98.4 °F (36.9 °C)  Current: Temperature: 98  °F (36.7 °C)    Physical Exam:   General:  No acute distress  Psychiatric:  Awake and alert  Pulmonary:  Normal respiratory excursion without accessory muscle use  Abdomen:  Soft, nontender  Extremities:  Left foot wrap intact  Skin:  No rashes    Lab Results:  I have personally reviewed pertinent labs.  Results from last 7 days   Lab Units 01/24/24  0457 01/23/24  0532 01/22/24  0547 01/19/24  1246 01/18/24  1455   SODIUM mmol/L 137 133* 135   < > 155*   POTASSIUM mmol/L 6.1* 6.0* 5.7*   < > 5.3   CHLORIDE mmol/L 102 98 97   < > 108   CO2 mmol/L 20* 24 24   < > 27   BUN mg/dL 69* 57* 72*   < > 46*   CREATININE mg/dL 8.72* 7.75* 9.39*   < > 5.76*   EGFR ml/min/1.73sq m 5 6 5   < > 9   CALCIUM mg/dL 8.0* 8.4 7.8*   < > 8.1*   AST U/L  --   --  11*  --  10*   ALT U/L  --   --  9  --  7   ALK PHOS U/L  --   --  92  --  94    < > = values in this interval not displayed.     Results from last 7 days   Lab Units 01/24/24  0457 01/23/24  0532 01/22/24  0547   WBC Thousand/uL 3.86* 4.09* 4.32   HEMOGLOBIN g/dL 10.0* 9.6* 9.0*   PLATELETS Thousands/uL 95* 99* 97*     Results from last 7 days   Lab Units 01/18/24  1408   GRAM STAIN RESULT  No polys seen*  4+ Gram positive cocci in pairs*  4+ Gram negative rods*  2+ Gram positive cocci in clusters*   WOUND CULTURE  3+ Growth of Klebsiella oxytoca*  3+ Growth of Pseudomonas aeruginosa*  3+ Growth of       Imaging Studies:   I have personally reviewed pertinent imaging study reports and images in PACS.  MRI images reviewed personally marrow edema calcaneous, images degraded by motion.    EKG, Pathology, and Other Studies:   I have personally reviewed pertinent reports.

## 2024-01-24 NOTE — PLAN OF CARE
Problem: PAIN - ADULT  Goal: Verbalizes/displays adequate comfort level or baseline comfort level  Description: Interventions:  - Encourage patient to monitor pain and request assistance  - Assess pain using appropriate pain scale  - Administer analgesics based on type and severity of pain and evaluate response  - Implement non-pharmacological measures as appropriate and evaluate response  - Consider cultural and social influences on pain and pain management  - Notify physician/advanced practitioner if interventions unsuccessful or patient reports new pain  Outcome: Progressing     Problem: INFECTION - ADULT  Goal: Absence or prevention of progression during hospitalization  Description: INTERVENTIONS:  - Assess and monitor for signs and symptoms of infection  - Monitor lab/diagnostic results  - Monitor all insertion sites, i.e. indwelling lines, tubes, and drains  - Monitor endotracheal if appropriate and nasal secretions for changes in amount and color  - Gilson appropriate cooling/warming therapies per order  - Administer medications as ordered  - Instruct and encourage patient and family to use good hand hygiene technique  - Identify and instruct in appropriate isolation precautions for identified infection/condition  Outcome: Progressing     Problem: SAFETY ADULT  Goal: Patient will remain free of falls  Description: INTERVENTIONS:  - Educate patient/family on patient safety including physical limitations  - Instruct patient to call for assistance with activity   - Consult OT/PT to assist with strengthening/mobility   - Keep Call bell within reach  - Keep bed low and locked with side rails adjusted as appropriate  - Keep care items and personal belongings within reach  - Initiate and maintain comfort rounds  - Make Fall Risk Sign visible to staff  - Offer Toileting every 2 Hours, in advance of need  - Initiate/Maintain bed/chair alarm  - Obtain necessary fall risk management equipment: slippers  - Apply  yellow socks and bracelet for high fall risk patients  - Consider moving patient to room near nurses station  Outcome: Progressing     Problem: DISCHARGE PLANNING  Goal: Discharge to home or other facility with appropriate resources  Description: INTERVENTIONS:  - Identify barriers to discharge w/patient and caregiver  - Arrange for needed discharge resources and transportation as appropriate  - Identify discharge learning needs (meds, wound care, etc.)  - Arrange for interpretive services to assist at discharge as needed  - Refer to Case Management Department for coordinating discharge planning if the patient needs post-hospital services based on physician/advanced practitioner order or complex needs related to functional status, cognitive ability, or social support system  Outcome: Progressing     Problem: Knowledge Deficit  Goal: Patient/family/caregiver demonstrates understanding of disease process, treatment plan, medications, and discharge instructions  Description: Complete learning assessment and assess knowledge base.  Interventions:  - Provide teaching at level of understanding  - Provide teaching via preferred learning methods  Outcome: Progressing     Problem: CARDIOVASCULAR - ADULT  Goal: Maintains optimal cardiac output and hemodynamic stability  Description: INTERVENTIONS:  - Monitor I/O, vital signs and rhythm  - Monitor for S/S and trends of decreased cardiac output  - Administer and titrate ordered vasoactive medications to optimize hemodynamic stability  - Assess quality of pulses, skin color and temperature  - Assess for signs of decreased coronary artery perfusion  - Instruct patient to report change in severity of symptoms  Outcome: Progressing     Problem: MUSCULOSKELETAL - ADULT  Goal: Maintain or return mobility to safest level of function  Description: INTERVENTIONS:  - Assess patient's ability to carry out ADLs; assess patient's baseline for ADL function and identify physical deficits  which impact ability to perform ADLs (bathing, care of mouth/teeth, toileting, grooming, dressing, etc.)  - Assess/evaluate cause of self-care deficits   - Assess range of motion  - Assess patient's mobility  - Assess patient's need for assistive devices and provide as appropriate  - Encourage maximum independence but intervene and supervise when necessary  - Involve family in performance of ADLs  - Assess for home care needs following discharge   - Consider OT consult to assist with ADL evaluation and planning for discharge  - Provide patient education as appropriate  Outcome: Progressing

## 2024-01-24 NOTE — DISCHARGE INSTR - AVS FIRST PAGE
Discharge Instructions - Podiatry    Weight Bearing Status: Please try to stay off the left heel as much as possible. Please only weightbearing for transfers                   Pain: Continue analgesics as needed    Follow-up appointment instructions: Please make an appointment within one week of discharge with Dr. Church at [facility]. Contact sooner if any increase in pain, or signs of infection occur    Wound Care: Leave dressings clean, dry, and intact between professional dressing changes    Nursing Instructions: Please apply  a wound vac dressing with the sponge fit directly to the left heel wound . Then cover with the adhesive dressing and the track pad. Ensure the wound vac is running at 125 mmHg continuous. Gauze and secure with Kerlix and tape. Please change dressing every Tuesday, Thursday, and Saturday.  Wound VAC was last applied on 1/24/2024

## 2024-01-24 NOTE — DISCHARGE SUMMARY
[facility]  Discharge- Asad Galloway 1960, 63 y.o. male MRN: [id number]  Unit/Bed#: NW8 874-01 Encounter: [id number]  Primary Care Provider: Raj Auguste DO   Date and time admitted to hospital: 1/18/2024 10:46 AM    * Nonhealing ulcer of heel ([facility])  Assessment & Plan  Patient presented with chronic nonhealing ulcer of the left heel, positive probe to bone concerning for clinical osteomyelitis. Sent in from podiatry office outpatient for direct admission   Patient afebrile, no leukocytosis and hemodynamically stable  Vascular surgery following,  S/p LEADS with diffuse disease but no evidence of significant stenosis or occlusion, toe pressures below healing range  No plans for surgical intervention at this time, son declining angiogram at this time. Recommend close outpatient follow up with Vascular Surgery, Dr. Galvan  Podiatry following,  WBAT left foot with surgical shoe  Patient did not tolerate MRI x2   Podiatry reapplying wound vac prior to discharge, then plan for close outpatient follow up with Podiatry, Dr. Church. Wound Care per Podiatry recs  Wound culture growing Pseudomonas and Klebsiella. ID following. Plan to discontinue IV abx at this time per ID    PAD (peripheral artery disease) ([facility])  Assessment & Plan  Peripheral artery disease status post LLE a gram with balloon angioplasty of PT and AT 9/2023.  Presented with nonhealing left heel ulcer as above, from   [facility] vascular surgery and podiatry recommendation  Son currently declining angiogram at this time   Continue aspirin and statin.  Follow up with Vascular, Dr. Galvan    CAD, multiple vessel  Assessment & Plan  Stable.  Continue aspirin, prasugrel, metoprolol and statin.  Denies CP    ESRD on dialysis ([facility])  Assessment & Plan  Lab Results   Component Value Date    EGFR 5 01/24/2024    EGFR 6 01/23/2024    EGFR 5 01/22/2024    CREATININE 8.72 (H) 01/24/2024    CREATININE 7.75 (H) 01/23/2024    CREATININE  9.39 (H) 01/22/2024     On hemodialysis, MWF schedule  Nephrology following,  Nephrology cleared the patient from their standpoint for discharge today    Ischemic cardiomyopathy  Assessment & Plan  Most recent EF 40%.  Euvolemic on exam.  Continue Toprol XL 50 mg BID  On Entresto  Volume management via dialysis    Type 2 diabetes mellitus with chronic kidney disease on chronic dialysis, without long-term current use of insulin (HCC)  Assessment & Plan  Lab Results   Component Value Date    HGBA1C 5.1 01/10/2024       Recent Labs     01/23/24  1033 01/23/24  1634 01/23/24  2050 01/24/24  1335   POCGLU 147* 134 109 81         Blood Sugar Average: Last 72 hrs:  (P) [id number]  Diet controlled at baseline     Hypotension  Assessment & Plan  BP overall fairly stable  Continue midodrine 2.5 mg PRN with dialysis.    Mood disorder (HCC)  Assessment & Plan  Continue home Depakote 250 mg BID    Anemia  Assessment & Plan  In the setting of end-stage renal disease.  Hemoglobin stable.  BAYLEE per nephrology.    Fall  Assessment & Plan  Per record review, patient slid out of wheelchair when attempting transport down to dialysis, 1/19. This was witnessed by both transport staff and wife. Per report, patient did not have head strike, no significant fall, slid to the floor. He was able to get back into bed.   Patient's son is refusing rehab on discharge    Mixed hyperlipidemia  Assessment & Plan  Continue home atorvastatin 40 mg daily.      Medical Problems       Resolved Problems  Date Reviewed: 1/24/2024   None       Discharging Physician / Practitioner: Bismark Adler PA-C  PCP: Raj Auguste DO  Admission Date:   Admission Orders (From admission, onward)       Ordered        01/18/24 1444  Inpatient Admission  Once                          Discharge Date: 01/24/24    Consultations During Hospital Stay:  Nephrology  Vascular Surgery  IR  Podiatry  Case management     Procedures Performed:   XR left heel/calcaneus   XR left  "foot  LEADs  XR follow up  MRI follow up MSK  MRI left ankle/heel  Wound culture and gram stain  Anaerobic culture and gram stain  CBC  CMP    Significant Findings / Test Results:   XR left heel/calcaneus: \"Soft tissue wound posterior to the calcaneus. No radiographic evidence of osteomyelitis. \"  XR left foot: \"Soft tissue wound posterior to the calcaneus. No radiographic evidence of osteomyelitis. \"  LEADs: \"Compared to previous study on 6/21/2023, there is no significant change \"  XR follow up: \"Intact sacral neurostimulator lead. \"  MRI follow up MSK: \"Nondiagnostic MRI left ankle with localizer sequences acquired only. The examination was terminated prematurely due to patient intolerance secondary to pain. \"  MRI left ankle/heel: \"Nondiagnostic exam due to motion. Nonspecific marrow edema at the plantar-posterior calcaneus. No definitive findings of osteomyelitis. No abscess.\"  Wound culture and gram stain: 3+ growth of Klebsiella oxytoca, 3+ growth of Pseudomonas aeruginosa   Anaerobic culture and gram stain: \"4+ Growth of - Mixed aerobic and anaerobic bacteria with predominance of Bacteroides thetaiotaomicron group  \"    Incidental Findings:   None     Test Results Pending at Discharge (will require follow up):   None     Outpatient Tests Requested:  Follow up with PCP, Vascular, and Podiatry     Complications:  None    Reason for Admission: heel ulcer    Hospital Course:   Asad Galloway is a 63 y.o. male with nonhealing heel ulcer, PAD, CAD, ESRD on HD, ICM, DM2, anemia, HLD, obesity (Body mass index is 33.73 kg/m².) who originally presented to the hospital on 1/18/2024 sent in from Podiatry office with nonhealing ulcer of the left heel, probe to bone, with concern for OM. He was placed on IV cefepime. XRs with no radiographic evidence for OM per the results report. Nephrology, Podiatry, Vascular, and ID were consulted. LEADs with no significant change compared to prior per the results report. IR was " "consulted for possible intervention. Patient's son refused angiogram. Patient did not tolerate MRI x2. I discussed with Vascular, ID, Podiatry,  o day of discharge. Per ID, no further antibiotics at this time. Vascular recommending outpatient follow up. Podiatry reapplying VAC prior to discharge. CM arranging for HHC. Patient's son refusing rehab on discharge.     Please see above list of diagnoses and related plan for additional information.     Condition at Discharge: stable    Discharge Day Visit / Exam:   Subjective:   Mr. Galloway denies any pain    Vitals: Blood Pressure: 152/86 (01/24/24 1215)  Pulse: 62 (01/24/24 1215)  Temperature: (!) 97.4 °F (36.3 °C) (01/24/24 1215)  Temp Source: Oral (01/24/24 1215)  Respirations: 18 (01/24/24 1215)  Height: 5' 6\" (167.6 cm) (01/18/24 2100)  Weight - Scale: 94.8 kg (209 lb) (01/18/24 2100)  SpO2: 98 % (01/23/24 2300)  Exam:   Physical Exam  Vitals reviewed.   Constitutional:       Comments: Patient seen lying in bed, Podiatry resident and  present, patient's wife also present and son on speaker phone   Cardiovascular:      Rate and Rhythm: Normal rate and regular rhythm.   Pulmonary:      Effort: Pulmonary effort is normal. No respiratory distress.      Breath sounds: Normal breath sounds.   Abdominal:      General: Bowel sounds are normal.      Palpations: Abdomen is soft.      Tenderness: There is no abdominal tenderness.   Musculoskeletal:      Right lower leg: No edema.      Left lower leg: No edema.   Skin:     General: Skin is warm.   Neurological:      Mental Status: He is alert.   Psychiatric:         Mood and Affect: Mood normal.          Discussion with Family: Updated  (wife) at bedside. And wife called son on speaker phone     Discharge instructions/Information to patient and family:   See after visit summary for information provided to patient and family.      Provisions for Follow-Up Care:  See after visit summary for " information related to follow-up care and any pertinent home health orders.      Mobility at time of Discharge:   Basic Mobility Inpatient Raw Score: 9  JH-HLM Goal: 3: Sit at edge of bed  JH-HLM Achieved: 4: Move to chair/commode  HLM Goal achieved. Continue to encourage appropriate mobility.     Disposition:   Home with VNA Services (Reminder: Complete face to face encounter)    Planned Readmission: None     Discharge Statement:  I spent 50 minutes discharging the patient. This time was spent on the day of discharge. I had direct contact with the patient on the day of discharge. Greater than 50% of the total time was spent examining patient, answering all patient questions, arranging and discussing plan of care with patient as well as directly providing post-discharge instructions.  Additional time then spent on discharge activities.    Discharge Medications:  See after visit summary for reconciled discharge medications provided to patient and/or family.      **Please Note: This note may have been constructed using a voice recognition system**

## 2024-01-24 NOTE — CASE MANAGEMENT
Case Management Discharge Planning Note    Patient name Asad Galloway  Location NW8 874/NW8 874-01 MRN [id number]  : 1960 Date 2024       Current Admission Date: 2024  Current Admission Diagnosis:Nonhealing ulcer of heel ([facility])   Patient Active Problem List    Diagnosis Date Noted    Fall 2024    PAD (peripheral artery disease) ([facility]) 2024    Acute on chronic systolic (congestive) heart failure ([facility]) 2024    Hypotension 2023    Edema of left lower extremity 2023    Rectal bleeding 2023    Nonhealing ulcer of heel ([facility]) 2023    Elevated troponin 2023    Presence of external cardiac defibrillator 2023    Diabetic polyneuropathy associated with type 2 diabetes mellitus ([facility]) 2023    Absence of toe of right foot ([facility]) 2023    Hammer toes of both feet 2023    Type 1 non-ST elevation myocardial infarction (NSTEMI) s/p ADE to in-stent restenosis of mid LAD 2023    Ischemic cardiomyopathy 2023    Moderate AS (aortic stenosis) 2023    Mood disorder ([facility]) 2023    SOB (shortness of breath) 10/21/2022    Left arm swelling 10/21/2022    CAD, multiple vessel 2022    Electrolyte abnormality 2022    Chest pain 2022    Generalized weakness 2022    Primary hypertension 2022    Penetrating foot wound, left, initial encounter 2022    Emotional lability 2022    History of 2019 novel coronavirus disease (COVID-19) 2020    ESRD on dialysis ([facility]) 2020    Anemia 10/05/2020    S/P arteriovenous (AV) fistula creation 2020    Pre-kidney transplant, listed 2020    Urge incontinence 2019    Anxiety associated with depression 2019    History of stroke 10/04/2018    Abnormal EEG 2018    Other constipation 2018    GERD (gastroesophageal reflux disease) 2018    Elevated alkaline phosphatase level 2018    Nephrotic range proteinuria  03/28/2018    Type 2 diabetes mellitus with chronic kidney disease on chronic dialysis, without long-term current use of insulin (HCC) 02/26/2016    Dizziness 02/26/2016    Mixed hyperlipidemia 02/26/2016    Antiplatelet or antithrombotic long-term use 02/26/2016      LOS (days): 6  Geometric Mean LOS (GMLOS) (days): 3  Days to GMLOS:-3     OBJECTIVE:  Risk of Unplanned Readmission Score: 36.57         Current admission status: Inpatient   Preferred Pharmacy:   [facility]/pharm[facility] #[id number] - [place] - [address]  [place] [address]  Phone: [phone] Fax: [phone]    Primary Care Provider: Raj Auguste DO    Primary Insurance: MEDICARE  Secondary Insurance: [facility]    DISCHARGE DETAILS:    Discharge planning discussed with:: son via speaker phone, patient, wife, [place] and podiatry at bedside     Comments - Freedom of Choice: Pt has been accepted at [facility] for VN, PT, OT & HHA f/u post d/c.  CM contacted family/caregiver?: Yes  Were Treatment Team discharge recommendations reviewed with patient/caregiver?: Yes          Contacts  Patient Contacts: Sam bear on speaker phone. wife at bedside  Contact Method: In Person  Reason/Outcome: Discharge Planning, Referral, Continuity of Care    Requested Home Health Care         Home Health Discipline requested:: Home Health Aide  HHA External Referral Reason (only applicable if external HHA name selected): Patient has established relationship with provider  Home Health Follow-Up Provider:: PCP    Treatment Team Recommendation: Home with Home Health Care  Discharge Destination Plan:: Home with Home Health Care  Transport at Discharge : Family

## 2024-01-26 ENCOUNTER — HOME CARE VISIT (OUTPATIENT)
Dept: HOME HEALTH SERVICES | Facility: HOME HEALTHCARE | Age: 64
End: 2024-01-26
Payer: MEDICARE

## 2024-01-26 NOTE — CASE COMMUNICATION
Call received from pt's son, Sam, reporting vac became disconnected and pt needs to have replaced.  As per on call note wife was able to apply wet to dry dressing.  Instructed son of dressing that is in place is sufficient and that CHARLIE vs will be made 1/27/24 as planned.  Verbalized understanding.

## 2024-01-27 ENCOUNTER — HOME CARE VISIT (OUTPATIENT)
Dept: HOME HEALTH SERVICES | Facility: HOME HEALTHCARE | Age: 64
End: 2024-01-27
Payer: MEDICARE

## 2024-01-27 PROCEDURE — G0299 HHS/HOSPICE OF RN EA 15 MIN: HCPCS

## 2024-01-28 VITALS
RESPIRATION RATE: 18 BRPM | HEART RATE: 61 BPM | OXYGEN SATURATION: 99 % | HEIGHT: 68 IN | BODY MASS INDEX: 31.67 KG/M2 | TEMPERATURE: 97.8 F | WEIGHT: 209 LBS | DIASTOLIC BLOOD PRESSURE: 60 MMHG | SYSTOLIC BLOOD PRESSURE: 108 MMHG

## 2024-01-28 NOTE — CASE COMMUNICATION
[facility] has admitted your patient to Home Health service with the following disciplines:      SN  This report is informational only, no response is needed  Primary focus of home health care- Integumentary   Patient stated goals of care- wound to heel and resume previous activities.    Anticipated visit pattern and next visit date- Next SN visit for 2/1 2w6  See medication list - meds in home differ from AVS- all medication in nayan e.  Significant clinical findings- wound vac was not intact at visit.  Wife applied wet to dry dressing on 12.25 and states she called A answering service. Wife states she was told no one was able to come until 1/27.  Pt has wound center appt on 1/30.  Wound vac applied with no difficulty at CHARLIE visit.    Potential barriers to goal achievement- unable to perform wound care due to location.    Thank you for allowing us to participate i n the care of your patient.      Chandni Angeles RN

## 2024-01-29 ENCOUNTER — HOME CARE VISIT (OUTPATIENT)
Dept: HOME HEALTH SERVICES | Facility: HOME HEALTHCARE | Age: 64
End: 2024-01-29
Payer: MEDICARE

## 2024-01-30 ENCOUNTER — OFFICE VISIT (OUTPATIENT)
Dept: WOUND CARE | Facility: HOSPITAL | Age: 64
End: 2024-01-30
Payer: MEDICARE

## 2024-01-30 VITALS
HEART RATE: 63 BPM | DIASTOLIC BLOOD PRESSURE: 60 MMHG | TEMPERATURE: 96 F | RESPIRATION RATE: 12 BRPM | SYSTOLIC BLOOD PRESSURE: 125 MMHG

## 2024-01-30 DIAGNOSIS — Z99.2 TYPE 2 DIABETES MELLITUS WITH CHRONIC KIDNEY DISEASE ON CHRONIC DIALYSIS, WITHOUT LONG-TERM CURRENT USE OF INSULIN (HCC): ICD-10-CM

## 2024-01-30 DIAGNOSIS — L97.423 ULCER OF HEEL, LEFT, WITH NECROSIS OF MUSCLE (HCC): Primary | ICD-10-CM

## 2024-01-30 DIAGNOSIS — N18.6 TYPE 2 DIABETES MELLITUS WITH CHRONIC KIDNEY DISEASE ON CHRONIC DIALYSIS, WITHOUT LONG-TERM CURRENT USE OF INSULIN (HCC): ICD-10-CM

## 2024-01-30 DIAGNOSIS — I73.9 PERIPHERAL ARTERIAL DISEASE (HCC): ICD-10-CM

## 2024-01-30 DIAGNOSIS — E11.22 TYPE 2 DIABETES MELLITUS WITH CHRONIC KIDNEY DISEASE ON CHRONIC DIALYSIS, WITHOUT LONG-TERM CURRENT USE OF INSULIN (HCC): ICD-10-CM

## 2024-01-30 PROCEDURE — 99204 OFFICE O/P NEW MOD 45 MIN: CPT | Performed by: FAMILY MEDICINE

## 2024-01-30 PROCEDURE — 11042 DBRDMT SUBQ TIS 1ST 20SQCM/<: CPT | Performed by: FAMILY MEDICINE

## 2024-01-30 RX ORDER — LIDOCAINE 40 MG/G
CREAM TOPICAL ONCE
Status: COMPLETED | OUTPATIENT
Start: 2024-01-30 | End: 2024-01-30

## 2024-01-30 RX ADMIN — LIDOCAINE 1 APPLICATION: 40 CREAM TOPICAL at 14:41

## 2024-01-30 NOTE — PROGRESS NOTES
"Patient ID: Asad Galloway is a 63 y.o. male Date of Birth 1960     Chief Complaint  Chief Complaint   Patient presents with    Follow Up Wound Care Visit     Left foot wound       Allergies  Patient has no known allergies.    Assessment:    Type 2 diabetes mellitus with chronic kidney disease on chronic dialysis, without long-term current use of insulin ([facility])    Lab Results   Component Value Date    HGBA1C 5.1 01/10/2024        Diagnoses and all orders for this visit:    Ulcer of heel, left, with necrosis of muscle (HCC)  -     Wound cleansing and dressings Diabetic Ulcer Left Heel; Future  -     Cancel: Wound negative pressure wound therapy Diabetic Ulcer Left Heel; Future  -     Wound off loading Diabetic Ulcer Left Heel; Future  -     lidocaine (LMX) 4 % cream  -     Debridement Diabetic Ulcer Left Heel  -     Wound negative pressure wound therapy Diabetic Ulcer Left Heel; Future    Type 2 diabetes mellitus with chronic kidney disease on chronic dialysis, without long-term current use of insulin ([facility])    Peripheral arterial disease (HCC)              Debridement   Wound 06/02/23 Diabetic Ulcer Heel Left    Universal Protocol:  Consent: Verbal consent obtained.  Consent given by: patient  Time out: Immediately prior to procedure a \"time out\" was called to verify the correct patient, procedure, equipment, support staff and site/side marked as required.  Timeout called at: 1/30/2024 2:20 PM.  Patient understanding: patient states understanding of the procedure being performed  Patient identity confirmed: verbally with patient    Debridement Details  Performed by: physician  Debridement type: surgical  Level of debridement: subcutaneous tissue  Pain control: lidocaine 4%      Post-debridement measurements  Length (cm): 1.3  Width (cm): 2  Depth (cm): 1  Percent debrided: 100%  Surface Area (cm^2): 2.6  Area Debrided (cm^2): 2.6  Volume (cm^3): 2.6    Tissue and other material debrided: subcutaneous " tissue  Devitalized tissue debrided: exudate and slough  Instrument(s) utilized: curette  Bleeding: small  Hemostasis obtained with: pressure  Response to treatment: procedure was not tolerated well  Debridement Comments: Procedure aborted secondary to pain      Plan:  Wound is worse  Debrided as above, procedure was not tolerated well and the procedure was aborted secondary to pain  Wound management with Dakin's wet-to-dry.  Hold wound VAC at this time.  See wound orders below  Discussed the importance of proper offloading.  Minimize walking  Control DM,  A1C results reviewed with the patient today.  Follow-up with Dr. Church as scheduled in 1 week, or call sooner with questions or concerns    Wound 06/02/23 Diabetic Ulcer Heel Left (Active)   Wound Image Images linked 01/30/24 1443   Wound Description Yellow;Gray;Slough;Pink 01/30/24 1420   Lani-wound Assessment Intact;Maceration 01/30/24 1420   Wound Length (cm) 1.3 cm 01/30/24 1420   Wound Width (cm) 2 cm 01/30/24 1420   Wound Depth (cm) 0.9 cm 01/30/24 1420   Wound Surface Area (cm^2) 2.6 cm^2 01/30/24 1420   Wound Volume (cm^3) 2.34 cm^3 01/30/24 1420   Calculated Wound Volume (cm^3) 2.34 cm^3 01/30/24 1420   Undermining 1 0.7 01/30/24 1420   Undermining 1 is depth extending from 1-3 o'clock 01/30/24 1420   Drainage Amount Moderate 01/30/24 1420   Drainage Description Serosanguineous;Lee 01/30/24 1420   Non-staged Wound Description Full thickness 01/30/24 1420   Patient Tolerance Tolerated well 01/30/24 1420   Dressing Status Intact 01/30/24 1420       Wound 06/02/23 Diabetic Ulcer Heel Left (Active)   Date First Assessed/Time First Assessed: 06/02/23 1908   Primary Wound Type: Diabetic Ulcer  Location: Heel  Wound Location Orientation: Left  Dressing Status: Clean;Dry;Intact       [REMOVED] Wound 01/29/23 Abrasion(s) Pretibial Right (Removed)   Resolved Date/Resolved Time: 12/21/23 0834  Date First Assessed/Time First Assessed: 01/29/23 1218   Traumatic Wound  Type: Abrasion(s)  Location: Pretibial  Wound Location Orientation: Right  Wound Description (Comments): Scabbed  Wound Outcome: Other...       [REMOVED] Wound 02/01/23 Wrist Anterior;Right (Removed)   Resolved Date: 12/26/23  Date First Assessed/Time First Assessed: 02/01/23 1806   Location: Wrist  Wound Location Orientation: Anterior;Right       [REMOVED] Wound 02/01/23 Skin Tear Abrasion(s) Arm Left;Posterior;Proximal (Removed)   Resolved Date: 12/26/23  Date First Assessed/Time First Assessed: 02/01/23 1930   Primary Wound Type: Skin Tear  Traumatic Wound Type: Abrasion(s)  Location: Arm  Wound Location Orientation: Left;Posterior;Proximal       [REMOVED] Wound 09/29/23 Groin Right (Removed)   Resolved Date: 12/26/23  Date First Assessed/Time First Assessed: 09/29/23 0957   Location: Groin  Wound Location Orientation: Right  Wound Description (Comments): New Puncture site noted   Incision's 1st Dressing: ADHESIVE SKIN HIGH VISCOSITY EXOFIN ...       Subjective:      .    Patient is a patient of Dr. Church who presents today for follow-up of a DFU of the left heel.  Patient was recently hospitalized from 1/18/2024 to 1/24/2024, sent from podiatry office with worsening left heel DFU.  Patient was seen by vascular while inpatient and it was recommended that he have angiogram with possible intervention which son declined at that time.  ID also followed the patient as there is question of osteomyelitis due to the exposed bone in the wound.  Patient was unable to tolerate 2 MRIs and thus osteomyelitis was not ruled in or out.  ID recommended optimizing arterial flow, IV antibiotics, and bone debridement if osteo confirmed.  Patient had a bedside debridement and a wound VAC was placed.  Visiting nurses have been changing the VAC.  Wife reports that the VAC was removed this morning and a Dakin's wet-to-dry was placed.  Patient son reports that he has been doing a fair amount of walking, including recently walking around  the mall with the VAC in place.  Using heel relief shoe        The following portions of the patient's history were reviewed and updated as appropriate: He  has a past medical history of Cerebrovascular accident (CVA) due to thrombosis of left middle cerebral artery ([facility]) (07/29/2018), Chronic kidney disease, Coronary artery disease, Diabetes mellitus ([facility]), Dialysis patient ([facility]), Fistula, GERD (gastroesophageal reflux disease), History of coronary artery stent placement, Hypercholesteremia, Hyperlipidemia, Hypertension, Infectious viral hepatitis, Limb alert care status, Neuropathy, Obesity, Osteomyelitis ([facility]), PVC's (premature ventricular contractions), Stroke ([facility]), TIA (transient ischemic attack) (10/28/2018), Wears dentures, and Wears glasses.  He   Patient Active Problem List    Diagnosis Date Noted    Fall 01/19/2024    PAD (peripheral artery disease) ([facility]) 01/18/2024    Acute on chronic systolic (congestive) heart failure ([facility]) 01/09/2024    Hypotension 12/26/2023    Edema of left lower extremity 12/26/2023    Rectal bleeding 12/05/2023    Nonhealing ulcer of heel ([facility]) 04/25/2023    Elevated troponin 03/17/2023    Presence of external cardiac defibrillator 03/17/2023    Diabetic polyneuropathy associated with type 2 diabetes mellitus ([facility]) 03/07/2023    Absence of toe of right foot ([facility]) 03/07/2023    Hammer toes of both feet 03/07/2023    Type 1 non-ST elevation myocardial infarction (NSTEMI) s/p ADE to in-stent restenosis of mid LAD 02/02/2023    Ischemic cardiomyopathy 02/02/2023    Moderate AS (aortic stenosis) 02/02/2023    Mood disorder ([facility]) 02/02/2023    SOB (shortness of breath) 10/21/2022    Left arm swelling 10/21/2022    CAD, multiple vessel 07/14/2022    Electrolyte abnormality 05/02/2022    Chest pain 05/01/2022    Generalized weakness 04/19/2022    Primary hypertension 04/06/2022    Penetrating foot wound, left, initial encounter 01/19/2022    Emotional lability 01/19/2022    History of  2019 novel coronavirus disease (COVID-19) 12/26/2020    ESRD on dialysis (HCC) 12/26/2020    Anemia 10/05/2020    S/P arteriovenous (AV) fistula creation 04/27/2020    Pre-kidney transplant, listed 04/27/2020    Urge incontinence 12/20/2019    Anxiety associated with depression 02/28/2019    History of stroke 10/04/2018    Abnormal EEG 09/24/2018    Other constipation 08/17/2018    GERD (gastroesophageal reflux disease) 08/13/2018    Elevated alkaline phosphatase level 08/03/2018    Nephrotic range proteinuria 03/28/2018    Type 2 diabetes mellitus with chronic kidney disease on chronic dialysis, without long-term current use of insulin ([facility]) 02/26/2016    Dizziness 02/26/2016    Mixed hyperlipidemia 02/26/2016    Antiplatelet or antithrombotic long-term use 02/26/2016     He  reports that he has never smoked. He has never used smokeless tobacco. He reports that he does not drink alcohol and does not use drugs.  Current Outpatient Medications   Medication Sig Dispense Refill    aspirin (ECOTRIN LOW STRENGTH) 81 mg EC tablet Take 81 mg by mouth daily Resume on 8/14        atorvastatin (LIPITOR) 40 mg tablet Take 1 tablet (40 mg total) by mouth daily with dinner 90 tablet 1    Blood Glucose Monitoring Suppl (True Metrix Meter) w/Device KIT Use to test blood sugars 3 times daily 1 kit 0    Blood Pressure Monitoring (BLOOD PRESSURE CUFF) MISC Use to check blood pressure before taking blood pressure medication and 1 hour after and follow instructions provided in discharge instructions based on the readings. 1 each 0    Cholecalciferol (Vitamin D3) 1.25 MG (29183 UT) CAPS TAKE 1 CAPSULE BY MOUTH ONE TIME PER WEEK 12 capsule 3    divalproex sodium (DEPAKOTE SPRINKLE) 125 MG capsule TAKE 2 CAPSULES (250 MG TOTAL) BY MOUTH EVERY 12 (TWELVE) HOURS 360 capsule 1    fluticasone (FLONASE) 50 mcg/act nasal spray 1 spray into each nostril daily 9.9 mL 0    glucose blood (True Metrix Blood Glucose Test) test strip Use 1 each 3  (three) times a day Use as instructed 300 strip 1    Lancets MISC Use 3 (three) times a day Use 3 times daily 300 each 0    metoprolol succinate (TOPROL-XL) 50 mg 24 hr tablet Take 1 tablet (50 mg total) by mouth 2 (two) times a day 180 tablet 3    midodrine (PROAMATINE) 2.5 mg tablet Take 1 tablet (2.5 mg total) by mouth as needed (for SBP<90 on dialysis days) 30 tablet 0    prasugrel (EFFIENT) tablet Take 1 tablet (5 mg total) by mouth daily (Patient taking differently: Take 5 mg by mouth daily Takes with pudding) 90 tablet 3    sacubitril-valsartan (Entresto) 24-26 MG TABS Take 1 tablet by mouth in the morning Bedtime 90 tablet 3    Sevelamer Carbonate 2.4 g PACK VIGOROUSLY MIX CONTENTS OF 1 PACKET IN WATER (IT WILL NOT DISSOLVE) AND DRINK 3 TIMES DAILY WITH MEALS      sodium hypochlorite (DAKIN'S HALF-STRENGTH) external solution Apply 1 Application topically every other day At each dressing change 473 mL 0     No current facility-administered medications for this visit.     He has No Known Allergies..    Review of Systems   Constitutional:  Negative for chills and fever.   HENT:  Negative for congestion and sneezing.    Respiratory:  Negative for cough.    Skin:  Positive for wound.   Psychiatric/Behavioral:  Negative for agitation.          Objective:       Wound 06/02/23 Diabetic Ulcer Heel Left (Active)   Wound Image Images linked 01/30/24 1443   Wound Description Yellow;Gray;Slough;Pink 01/30/24 1420   Lani-wound Assessment Intact;Maceration 01/30/24 1420   Wound Length (cm) 1.3 cm 01/30/24 1420   Wound Width (cm) 2 cm 01/30/24 1420   Wound Depth (cm) 0.9 cm 01/30/24 1420   Wound Surface Area (cm^2) 2.6 cm^2 01/30/24 1420   Wound Volume (cm^3) 2.34 cm^3 01/30/24 1420   Calculated Wound Volume (cm^3) 2.34 cm^3 01/30/24 1420   Undermining 1 0.7 01/30/24 1420   Undermining 1 is depth extending from 1-3 o'clock 01/30/24 1420   Drainage Amount Moderate 01/30/24 1420   Drainage Description Serosanguineous;Tan  01/30/24 1420   Non-staged Wound Description Full thickness 01/30/24 1420   Patient Tolerance Tolerated well 01/30/24 1420   Dressing Status Intact 01/30/24 1420       /60   Pulse 63   Temp (!) 96 °F (35.6 °C)   Resp 12     Physical Exam        Wound 06/02/23 Diabetic Ulcer Heel Left (Active)   Enter Oliveros score: Oliveros Grade 2: Deep ulcer extended to ligament, tendon, joint capsule, bone, or deep fascia without abscess or osteomyelitis (OM) 01/18/24 0941   Wound Image   01/30/24 1443   Wound Description Yellow;Gray;Slough;Pink 01/30/24 1420   Lani-wound Assessment Intact;Maceration 01/30/24 1420   Wound Length (cm) 1.3 cm 01/30/24 1420   Wound Width (cm) 2 cm 01/30/24 1420   Wound Depth (cm) 0.9 cm 01/30/24 1420   Wound Surface Area (cm^2) 2.6 cm^2 01/30/24 1420   Wound Volume (cm^3) 2.34 cm^3 01/30/24 1420   Calculated Wound Volume (cm^3) 2.34 cm^3 01/30/24 1420   Undermining 1 0.7 01/30/24 1420   Undermining 1 is depth extending from 1-3 o'clock 01/30/24 1420   Drainage Amount Moderate 01/30/24 1420   Drainage Description Serosanguineous;Lee 01/30/24 1420   Non-staged Wound Description Full thickness 01/30/24 1420   Patient Tolerance Tolerated well 01/30/24 1420   Dressing Status Intact 01/30/24 1420                       Wound Instructions:  Orders Placed This Encounter   Procedures    Wound cleansing and dressings Diabetic Ulcer Left Heel     Wound location: Left heel  Change dressing Daily  Sponge bathe only, do not shower at this time.  Cleanse the wound with NSS, pat dry.   Apply 1/4 Dakin's soaked gauze wet to drydressing to wound  Cover with Abd pad  Secure with roll gauze and tape    This was applied today at the [facility].     Standing Status:   Future     Standing Expiration Date:   1/30/2025    Wound off loading Diabetic Ulcer Left Heel     Continue use of heel relief shoe. Ensure no pressure on wound, offload with heel medic/prevalon or pillows to float wound off surfaces.     Standing Status:    Future     Standing Expiration Date:   1/30/2025    Debridement Diabetic Ulcer Left Heel     This order was created via procedure documentation    Wound negative pressure wound therapy Diabetic Ulcer Left Heel     Hold the Negative wound vac therapy until next Wound care visit. Bring your device and dressing for wound vac therapy to wound care center.  Wound will be re asseessed next wound care visit  Do not return the device to the company.     Standing Status:   Future     Standing Expiration Date:   1/30/2025        Diagnosis ICD-10-CM Associated Orders   1. Ulcer of heel, left, with necrosis of muscle ([facility])  L97.423 Wound cleansing and dressings Diabetic Ulcer Left Heel     Wound off loading Diabetic Ulcer Left Heel     lidocaine (LMX) 4 % cream     Debridement Diabetic Ulcer Left Heel     Wound negative pressure wound therapy Diabetic Ulcer Left Heel      2. Type 2 diabetes mellitus with chronic kidney disease on chronic dialysis, without long-term current use of insulin ([facility])  E11.22     N18.6     Z99.2       3. Peripheral arterial disease ([facility])  I73.9

## 2024-01-30 NOTE — PATIENT INSTRUCTIONS
Orders Placed This Encounter   Procedures    Wound cleansing and dressings Diabetic Ulcer Left Heel     Wound location: Left heel  Change dressing Daily  Sponge bathe only, do not shower at this time.  Cleanse the wound with NSS, pat dry.   Apply 1/4 Dakin's soaked gauze wet to drydressing to wound  Cover with Abd pad  Secure with roll gauze and tape    This was applied today at the [facility].     Standing Status:   Future     Standing Expiration Date:   1/30/2025    Wound off loading Diabetic Ulcer Left Heel     Continue use of heel relief shoe. Ensure no pressure on wound, offload with heel medic/prevalon or pillows to float wound off surfaces.     Standing Status:   Future     Standing Expiration Date:   1/30/2025    Debridement     This order was created via procedure documentation    Wound negative pressure wound therapy Diabetic Ulcer Left Heel     Hold the Negative wound vac therapy until next Wound care visit. Bring your device and dressing for wound vac therapy to wound care center.  Wound will be re asseessed next wound care visit  Do not return the device to the company.     Standing Status:   Future     Standing Expiration Date:   1/30/2025

## 2024-02-01 ENCOUNTER — HOME CARE VISIT (OUTPATIENT)
Dept: HOME HEALTH SERVICES | Facility: HOME HEALTHCARE | Age: 64
End: 2024-02-01
Payer: MEDICARE

## 2024-02-01 ENCOUNTER — TELEPHONE (OUTPATIENT)
Age: 64
End: 2024-02-01

## 2024-02-01 ENCOUNTER — TRANSITIONAL CARE MANAGEMENT (OUTPATIENT)
Dept: INTERNAL MEDICINE CLINIC | Facility: CLINIC | Age: 64
End: 2024-02-01

## 2024-02-01 PROCEDURE — G0299 HHS/HOSPICE OF RN EA 15 MIN: HCPCS

## 2024-02-02 VITALS
HEART RATE: 57 BPM | DIASTOLIC BLOOD PRESSURE: 60 MMHG | OXYGEN SATURATION: 97 % | RESPIRATION RATE: 18 BRPM | SYSTOLIC BLOOD PRESSURE: 102 MMHG | TEMPERATURE: 97 F

## 2024-02-02 DIAGNOSIS — L97.422 ULCER OF LEFT HEEL, WITH FAT LAYER EXPOSED (HCC): ICD-10-CM

## 2024-02-02 PROCEDURE — 10330064 SPONGE, GAUZE 12PLY STR 4"X4" (1/PK 50/B

## 2024-02-02 PROCEDURE — 10330064 PAD, ABD 5X9" STR LF (1/PK 20PK/BX) MGM1

## 2024-02-02 PROCEDURE — 10330064 BANDAGE, GAUZE COTTON  6PLY STR 4"X4YDS

## 2024-02-02 RX ORDER — SODIUM HYPOCHLORITE 2.5 MG/ML
1 SOLUTION TOPICAL EVERY OTHER DAY
Qty: 473 ML | Refills: 0 | Status: SHIPPED | OUTPATIENT
Start: 2024-02-02

## 2024-02-03 ENCOUNTER — HOME CARE VISIT (OUTPATIENT)
Dept: HOME HEALTH SERVICES | Facility: HOME HEALTHCARE | Age: 64
End: 2024-02-03
Payer: MEDICARE

## 2024-02-03 VITALS
SYSTOLIC BLOOD PRESSURE: 108 MMHG | OXYGEN SATURATION: 99 % | HEART RATE: 62 BPM | DIASTOLIC BLOOD PRESSURE: 58 MMHG | TEMPERATURE: 97.2 F | RESPIRATION RATE: 16 BRPM

## 2024-02-03 PROCEDURE — G0299 HHS/HOSPICE OF RN EA 15 MIN: HCPCS

## 2024-02-06 ENCOUNTER — HOME CARE VISIT (OUTPATIENT)
Dept: HOME HEALTH SERVICES | Facility: HOME HEALTHCARE | Age: 64
End: 2024-02-06
Payer: MEDICARE

## 2024-02-06 VITALS
HEART RATE: 76 BPM | SYSTOLIC BLOOD PRESSURE: 120 MMHG | OXYGEN SATURATION: 98 % | TEMPERATURE: 98.1 F | RESPIRATION RATE: 18 BRPM | DIASTOLIC BLOOD PRESSURE: 76 MMHG

## 2024-02-06 PROCEDURE — G0300 HHS/HOSPICE OF LPN EA 15 MIN: HCPCS

## 2024-02-06 NOTE — ED NOTES
Patient with reported TSH 5 months ago 13.669    Plan:  Repeat TSH elevated, T4 normal  Increased home levothyroxine    Son called again for update on pts condition, given lunch tray and is eating     Rudean Fede, RN  12/31/20 [id number]

## 2024-02-08 ENCOUNTER — OFFICE VISIT (OUTPATIENT)
Dept: CARDIOLOGY CLINIC | Facility: CLINIC | Age: 64
End: 2024-02-08
Payer: MEDICARE

## 2024-02-08 ENCOUNTER — HOME CARE VISIT (OUTPATIENT)
Dept: HOME HEALTH SERVICES | Facility: HOME HEALTHCARE | Age: 64
End: 2024-02-08
Payer: MEDICARE

## 2024-02-08 ENCOUNTER — OFFICE VISIT (OUTPATIENT)
Dept: WOUND CARE | Facility: HOSPITAL | Age: 64
End: 2024-02-08
Payer: MEDICARE

## 2024-02-08 VITALS
DIASTOLIC BLOOD PRESSURE: 58 MMHG | HEIGHT: 66 IN | SYSTOLIC BLOOD PRESSURE: 86 MMHG | OXYGEN SATURATION: 95 % | BODY MASS INDEX: 33.59 KG/M2 | HEART RATE: 67 BPM | WEIGHT: 209 LBS

## 2024-02-08 VITALS
TEMPERATURE: 96.9 F | HEART RATE: 66 BPM | SYSTOLIC BLOOD PRESSURE: 139 MMHG | RESPIRATION RATE: 18 BRPM | DIASTOLIC BLOOD PRESSURE: 55 MMHG

## 2024-02-08 DIAGNOSIS — Z95.5 S/P CORONARY ARTERY STENT PLACEMENT: ICD-10-CM

## 2024-02-08 DIAGNOSIS — E78.2 MIXED HYPERLIPIDEMIA: ICD-10-CM

## 2024-02-08 DIAGNOSIS — L97.423 ULCER OF HEEL, LEFT, WITH NECROSIS OF MUSCLE (HCC): Primary | ICD-10-CM

## 2024-02-08 DIAGNOSIS — Z79.02 ANTIPLATELET OR ANTITHROMBOTIC LONG-TERM USE: ICD-10-CM

## 2024-02-08 DIAGNOSIS — I15.0 RENOVASCULAR HYPERTENSION: ICD-10-CM

## 2024-02-08 DIAGNOSIS — E11.22 TYPE 2 DIABETES MELLITUS WITH CHRONIC KIDNEY DISEASE ON CHRONIC DIALYSIS, WITHOUT LONG-TERM CURRENT USE OF INSULIN (HCC): ICD-10-CM

## 2024-02-08 DIAGNOSIS — N18.6 ESRD (END STAGE RENAL DISEASE) (HCC): ICD-10-CM

## 2024-02-08 DIAGNOSIS — I73.9 PERIPHERAL ARTERIAL DISEASE (HCC): ICD-10-CM

## 2024-02-08 DIAGNOSIS — Z99.2 TYPE 2 DIABETES MELLITUS WITH CHRONIC KIDNEY DISEASE ON CHRONIC DIALYSIS, WITHOUT LONG-TERM CURRENT USE OF INSULIN (HCC): ICD-10-CM

## 2024-02-08 DIAGNOSIS — I25.10 CORONARY ARTERY DISEASE INVOLVING NATIVE CORONARY ARTERY OF NATIVE HEART WITHOUT ANGINA PECTORIS: Primary | ICD-10-CM

## 2024-02-08 DIAGNOSIS — N18.6 TYPE 2 DIABETES MELLITUS WITH CHRONIC KIDNEY DISEASE ON CHRONIC DIALYSIS, WITHOUT LONG-TERM CURRENT USE OF INSULIN (HCC): ICD-10-CM

## 2024-02-08 DIAGNOSIS — I25.5 ISCHEMIC CARDIOMYOPATHY: ICD-10-CM

## 2024-02-08 PROCEDURE — 99214 OFFICE O/P EST MOD 30 MIN: CPT | Performed by: INTERNAL MEDICINE

## 2024-02-08 PROCEDURE — 11043 DBRDMT MUSC&/FSCA 1ST 20/<: CPT | Performed by: PODIATRIST

## 2024-02-08 RX ORDER — LIDOCAINE 40 MG/G
CREAM TOPICAL ONCE
Status: COMPLETED | OUTPATIENT
Start: 2024-02-08 | End: 2024-02-08

## 2024-02-08 RX ORDER — ATORVASTATIN CALCIUM 40 MG/1
40 TABLET, FILM COATED ORAL
Qty: 90 TABLET | Refills: 1 | Status: SHIPPED | OUTPATIENT
Start: 2024-02-08

## 2024-02-08 RX ADMIN — LIDOCAINE: 40 CREAM TOPICAL at 09:10

## 2024-02-08 NOTE — PROGRESS NOTES
[facility]    Name:Asad Galloway   DOS: 2/8/2024     Chief Complaint:   Chief Complaint   Patient presents with    Follow-up       HISTORY OF PRESENT ILLNESS:      HPI:  Asad Galloway is a 63 y.o. male. He  has a past medical history of Cerebrovascular accident (CVA) due to thrombosis of left middle cerebral artery (HCC) (07/29/2018), Chronic kidney disease, Coronary artery disease, Diabetes mellitus (HCC), Dialysis patient (HCC), Fistula, GERD (gastroesophageal reflux disease), History of coronary artery stent placement, Hypercholesteremia, Hyperlipidemia, Hypertension, Infectious viral hepatitis, Limb alert care status, Neuropathy, Obesity, Osteomyelitis (HCC), PVC's (premature ventricular contractions), Stroke (HCC), TIA (transient ischemic attack) (10/28/2018), Wears dentures, and Wears glasses.    He presents for follow-up evaluation.  Last saw me in the office on 11/9/2023.  Per my prior office visit notes documenting his history:  He was admitted January 29, 2023 due to sudden onset of shortness of breath also with chest pressure/pain.  He was in acute respiratory failure requiring BiPAP in the ED and is eventually transferred to the ICU.  He was in pulmonary edema.  He underwent volume removal with renal replacement therapy.  He was also found to have elevated troponins and was started on heparin drip.  Echocardiogram showed reduction in LVEF to 25 to 30%.  Last known EF prior was normal.  He was transferred to [place] to undergo cardiac catheterization and was found to have progression of coronary artery disease compared to cardiac catheterization in May 2022.  He had in-stent stenosis of the LAD status post PCI/stent.  He had occluded OM stent which could not be wired thus unable to revascularize.  Also with occlusion of the RPAV branch.  Patient placed on medical therapy and discharged on a LifeVest.  He was unable to tolerate Entresto in the past due to low blood pressure.   Subsequent echocardiogram showed improvement in LVEF with echo in February 2023 at [facility] showing EF of 35 to 40%.  Repeat echo on 4/24/2023 showed EF of 35%.  Refer to Dr. Weber for ICD evaluation.  Deemed that he is going to be at high risk of infection due to hemodialysis and possible immunosuppression when he gets renal transplant.  Current osteomyelitis also a concern.  Patient referred to heart failure service for further optimization of medical therapy and if he would be a candidate for Entresto or SGLT2 inhibitor, with the hope of improving LVEF more than 35% and not need defibrillator placement.     Since that time, his LV ejection fraction has subsequently improved and is now most recently 40%.  His external cardiac defibrillator (LifeVest) was successfully discontinued without any intervention/therapy received.  He has been doing very well, with no active cardiopulmonary complaints.  He specifically denies recurrent chest pain, shortness of breath, diaphoresis, dizziness, palpitations, orthopnea.  He has no significant edema.  Has had no syncopal episodes.  Reports his blood pressure has been doing very well at home, to the point where he is no longer requiring midodrine.  He remains in anticipation for kidney transplant enlistment, currently on hold due to a left lower extremity nonhealing wound.       ROS    ROS: Pertinent positives and negatives as described in History of Present Illness. Remainder of a 14 point review of systems was negative.     No Known Allergies     Current Outpatient Medications on File Prior to Visit   Medication Sig Dispense Refill    aspirin (ECOTRIN LOW STRENGTH) 81 mg EC tablet Take 81 mg by mouth daily Resume on 8/14        Blood Glucose Monitoring Suppl (True Metrix Meter) w/Device KIT Use to test blood sugars 3 times daily 1 kit 0    Blood Pressure Monitoring (BLOOD PRESSURE CUFF) MISC Use to check blood pressure before taking blood pressure medication and 1 hour after and  follow instructions provided in discharge instructions based on the readings. 1 each 0    Cholecalciferol (Vitamin D3) 1.25 MG (30143 UT) CAPS TAKE 1 CAPSULE BY MOUTH ONE TIME PER WEEK 12 capsule 3    divalproex sodium (DEPAKOTE SPRINKLE) 125 MG capsule TAKE 2 CAPSULES (250 MG TOTAL) BY MOUTH EVERY 12 (TWELVE) HOURS (Patient taking differently: Take 125 mg by mouth every 12 (twelve) hours) 360 capsule 1    glucose blood (True Metrix Blood Glucose Test) test strip Use 1 each 3 (three) times a day Use as instructed 300 strip 1    Lancets MISC Use 3 (three) times a day Use 3 times daily 300 each 0    metoprolol succinate (TOPROL-XL) 50 mg 24 hr tablet Take 1 tablet (50 mg total) by mouth 2 (two) times a day 180 tablet 3    prasugrel (EFFIENT) tablet Take 1 tablet (5 mg total) by mouth daily (Patient taking differently: Take 5 mg by mouth daily Takes with pudding) 90 tablet 3    sacubitril-valsartan (Entresto) 24-26 MG TABS Take 1 tablet by mouth in the morning Bedtime 90 tablet 3    Sevelamer Carbonate 2.4 g PACK VIGOROUSLY MIX CONTENTS OF 1 PACKET IN WATER (IT WILL NOT DISSOLVE) AND DRINK 3 TIMES DAILY WITH MEALS      sodium hypochlorite (DAKIN'S HALF-STRENGTH) external solution Apply 1 Application topically every other day At each dressing change 473 mL 0    [DISCONTINUED] atorvastatin (LIPITOR) 40 mg tablet Take 1 tablet (40 mg total) by mouth daily with dinner 90 tablet 1    [DISCONTINUED] fluticasone (FLONASE) 50 mcg/act nasal spray 1 spray into each nostril daily 9.9 mL 0    [DISCONTINUED] midodrine (PROAMATINE) 2.5 mg tablet Take 1 tablet (2.5 mg total) by mouth as needed (for SBP<90 on dialysis days) (Patient not taking: Reported on 2/8/2024) 30 tablet 0     No current facility-administered medications on file prior to visit.       Past Medical History:   Diagnosis Date    Cerebrovascular accident (CVA) due to thrombosis of left middle cerebral artery (HCC) 07/29/2018    Chronic kidney disease     Coronary  "artery disease     Diabetes mellitus (HCC)     not on meds    Dialysis patient (HCC)     M-W-F    Fistula     left upper arm for hemodialyis    GERD (gastroesophageal reflux disease)     History of coronary artery stent placement     x3    Hypercholesteremia     Hyperlipidemia     Hypertension     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    Limb alert care status     no BP/IV left arm    Neuropathy     Obesity     Osteomyelitis (HCC)     last assessed 11/4/16-per son not currently    PVC's (premature ventricular contractions)     sees [facility]    Stroke (HCC)     last weeof July 2018 [facility]    TIA (transient ischemic attack) 10/28/2018    Wears dentures     Wears glasses        Past Surgical History:   Procedure Laterality Date    ABDOMINAL SURGERY      CARDIAC CATHETERIZATION N/A 05/02/2022    Procedure: Cardiac Coronary Angiogram;  Surgeon: Sam Davis MD;  [facility];  Service: Cardiology    CARDIAC CATHETERIZATION N/A 05/02/2022    Procedure: Cardiac pci;  Surgeon: Sam Davis MD;  [facility];  Service: Cardiology    CARDIAC CATHETERIZATION  02/01/2023    Procedure: Cardiac catheterization;  Surgeon: Sam Davis MD;  [facility];  Service: Cardiology    CARDIAC CATHETERIZATION N/A 02/01/2023    Procedure: Cardiac pci;  Surgeon: Sam Davis MD;  [facility];  Service: Cardiology    CARDIAC CATHETERIZATION N/A 02/01/2023    Procedure: Cardiac Coronary Angiogram;  Surgeon: Sam Davis MD;  [facility];  Service: Cardiology    CARDIAC CATHETERIZATION N/A 02/01/2023    Procedure: Cardiac other-IVUS;  Surgeon: Sam Davis MD;  [facility];  Service: Cardiology    CHOLECYSTECTOMY      Percutaneous    COLONOSCOPY      CYSTOSCOPY      HEMODIALYSIS ADULT  1/22/2024    HEMODIALYSIS ADULT  1/24/2024    IR LOWER EXTREMITY ANGIOGRAM  9/29/2023    OTHER SURGICAL HISTORY      \"stimulator to " "control bowel movements\"    MA ESOPHAGOGASTRODUODENOSCOPY TRANSORAL DIAGNOSTIC N/A 09/27/2016    Procedure: ESOPHAGOGASTRODUODENOSCOPY (EGD);  Surgeon: Adele Rowe MD;  Location: [facility] GI LAB;  Service: Gastroenterology    MA LAPAROSCOPY SURG CHOLECYSTECTOMY N/A 02/29/2016    Procedure: LAPAROSCOPIC CHOLECYSTECTOMY ;  Surgeon: Cliff Roman DO;  Location: [facility];  Service: General    MA SLCTV CATHJ 3RD+ ORD SLCTV ABDL PEL/LXTR BRNCH Left 9/29/2023    Procedure: Left leg angiogram with intervention;  Surgeon: Michelle Galvan MD;  Location: [facility];  Service: Vascular    ROTATOR CUFF REPAIR Right     SPINAL CORD STIMULATOR IMPLANT      \"Medtronic interstim model # 3058- in lower back to control bowel movements-currently turned off-battery is dead\"    TOE AMPUTATION Right 10/28/2016    Procedure: 3RD TOE AMPUTATION ;  Surgeon: Anjel Salas DPM;  Location: [facility];  Service:        Family History   Problem Relation Age of Onset    Leukemia Mother     Liver disease Mother     Lung cancer Mother         heavy smoker - 3 ppd    Heart disease Father     Liver disease Father     Diabetes type I Father     Multiple myeloma Sister     Breast cancer Sister     Urolithiasis Family     Alcohol abuse Neg Hx     Depression Neg Hx     Drug abuse Neg Hx     Substance Abuse Neg Hx     Mental illness Neg Hx        Social History     Socioeconomic History    Marital status: /Civil Union     Spouse name: Not on file    Number of children: Not on file    Years of education: Not on file    Highest education level: Not asked   Occupational History    Occupation: disabled   Tobacco Use    Smoking status: Never    Smokeless tobacco: Never   Vaping Use    Vaping status: Never Used   Substance and Sexual Activity    Alcohol use: Never    Drug use: No    Sexual activity: Not Currently     Partners: Female     Comment: defer   Other Topics Concern    Not on file   Social History Narrative    Daily caffeine " "consumption 2-3 servings a day     Social Determinants of Health     Financial Resource Strain: Patient Declined (7/13/2023)    Overall Financial Resource Strain (CARDIA)     Difficulty of Paying Living Expenses: Patient declined   Food Insecurity: No Food Insecurity (1/19/2024)    Hunger Vital Sign     Worried About Running Out of Food in the Last Year: Never true     Ran Out of Food in the Last Year: Never true   Transportation Needs: No Transportation Needs (1/19/2024)    PRAPARE - Transportation     Lack of Transportation (Medical): No     Lack of Transportation (Non-Medical): No   Physical Activity: Not on file   Stress: No Stress Concern Present (1/18/2019)    [employer] [place] [place] [employer] - Occupational Stress Questionnaire     Feeling of Stress : Only a little   Social Connections: Unknown (1/18/2019)    Social Connection and Isolation Panel [NHANES]     Frequency of Communication with Friends and Family: Not on file     Frequency of Social Gatherings with Friends and Family: Not on file     Attends Nondenominational Services: Not on file     Active Member of Clubs or Organizations: Not on file     Attends Club or Organization Meetings: Not on file     Marital Status:    Intimate Partner Violence: Not At Risk (1/18/2019)    Humiliation, Afraid, Rape, and Kick questionnaire     Fear of Current or Ex-Partner: No     Emotionally Abused: No     Physically Abused: No     Sexually Abused: No   Housing Stability: Low Risk  (1/19/2024)    Housing Stability Vital Sign     Unable to Pay for Housing in the Last Year: No     Number of Places Lived in the Last Year: 1     Unstable Housing in the Last Year: No       OBJECTIVE:    BP (!) 86/58 (BP Location: Right arm, Patient Position: Sitting, Cuff Size: Standard)   Pulse 67   Ht 5' 6\" (1.676 m)   Wt 94.8 kg (209 lb)   SpO2 95%   BMI 33.73 kg/m²      BP Readings from Last 3 Encounters:   02/08/24 139/55   02/08/24 (!) 86/58   02/06/24 120/76       Wt " Readings from Last 3 Encounters:   02/08/24 94.8 kg (209 lb)   01/27/24 94.8 kg (209 lb)   01/18/24 94.8 kg (209 lb)         Physical Exam  Vitals reviewed.   Constitutional:       General: He is not in acute distress.     Appearance: Normal appearance. He is not diaphoretic.      Comments: Objectively appears improved compared to prior assessments.   HENT:      Head: Normocephalic and atraumatic.   Eyes:      Conjunctiva/sclera: Conjunctivae normal.   Neck:      Vascular: No carotid bruit or JVD.   Cardiovascular:      Rate and Rhythm: Normal rate and regular rhythm.      Pulses: Normal pulses.      Heart sounds: Normal heart sounds. No murmur heard.     No friction rub. No gallop.   Pulmonary:      Effort: Pulmonary effort is normal.      Breath sounds: Normal breath sounds. No wheezing, rhonchi or rales.   Abdominal:      General: Abdomen is flat. Bowel sounds are normal. There is no distension.      Palpations: Abdomen is soft.   Musculoskeletal:      Right lower leg: No edema.      Left lower leg: No edema.   Skin:     General: Skin is warm and dry.      Comments: LUE AV fistula   Neurological:      General: No focal deficit present.      Mental Status: He is alert and oriented to person, place, and time.   Psychiatric:         Mood and Affect: Mood normal.         Behavior: Behavior normal.                                                       Cardiac testing:     ECHO 12/21/23 (personally reviewed, independently interpreted in office today)  Left Ventricle Left ventricular cavity size is normal. Wall thickness is mildly increased. The left ventricular ejection fraction is 40%. Systolic function is moderately reduced. Wall motion is normal. Diastolic function is mildly abnormal, consistent with grade I (abnormal) relaxation.   Wall Scoring Baseline     The following segments are akinetic: apex.  The following segments are hypokinetic: mid inferior, mid inferolateral, apical anterior, apical inferior and apical  lateral.  All other segments are normal.            Right Ventricle Right ventricular cavity size is dilated. Systolic function is mildly reduced. Wall thickness is normal.   Left Atrium The atrium is mildly dilated.   Right Atrium The atrium is normal in size.   Aortic Valve The aortic valve is trileaflet. The leaflets are not thickened. The leaflets are not calcified. The leaflets exhibit normal mobility. There is mild to moderate regurgitation. There is moderate to severe stenosis by visual assessment and calcification severity. I suspect measurements underestimate the severity.. The aortic valve peak velocity is 2.58 m/s. The aortic valve mean gradient is 15 mmHg. The dimensionless velocity index is 0.39. The aortic valve area is 1.99 cm2.   Mitral Valve Mitral valve structure is normal.  There is moderate regurgitation. There is no evidence of stenosis.   Tricuspid Valve Tricuspid valve structure is normal. There is mild regurgitation. There is no evidence of stenosis. The right ventricular systolic pressure is moderately elevated. The estimated right ventricular systolic pressure is 56.00 mmHg.   Pulmonic Valve Pulmonic valve structure is normal. There is no evidence of regurgitation. There is no evidence of stenosis.   Ascending Aorta The aortic root is normal in size.   IVC/SVC The right atrial pressure is estimated at 8.0 mmHg. The inferior vena cava is normal in size.   Pericardium There is no pericardial effusion. The pericardium is normal in appearance.       Results for orders placed during the hospital encounter of 10/05/20    Echo complete with contrast if indicated    [facility]  [address] [place] [address] [phone]    Transthoracic Echocardiogram  2D, M-mode, Doppler, and Color Doppler    Study date:  06-Oct-2020    Patient: OMAR ANDERSON  MR number: [id number]  Account number: [id number]  : 1960  Age: 60 years  Gender: Male  Status:  Inpatient  Location: Bedside  Height: 70 in  Weight: 239.6 lb  BP: 165/ 80 mmHg    Indications: Shortness of breath.    Diagnoses: R06.02 - Shortness of breath    Sonographer:  Lucy Melissa RDCS  Primary Physician:  Raj Auguste DO  Referring Physician:  Jaime Hernandez MD  Group:  [facility]  Interpreting Physician:  Sancho Galvan MD    SUMMARY    LEFT VENTRICLE:  Systolic function was normal by visual assessment. Ejection fraction was estimated to be 60 %.  There were no regional wall motion abnormalities.  Wall thickness was moderately increased.  Features were consistent with a pseudonormal left ventricular filling pattern, with concomitant abnormal relaxation and increased filling pressure (grade 2 diastolic dysfunction).    LEFT ATRIUM:  The atrium was mildly dilated.    RIGHT ATRIUM:  The atrium was mildly dilated.    MITRAL VALVE:  There was moderate annular calcification.  There was mild regurgitation.    AORTIC VALVE:  The valve was trileaflet. Leaflets exhibited normal thickness, moderate calcification, and moderately reduced cuspal separation.  Transaortic velocity was increased due to valvular stenosis.  There was mild to moderate stenosis.  There was mild regurgitation.    TRICUSPID VALVE:  There was trace regurgitation.    PULMONIC VALVE:  There was mild regurgitation.    HISTORY: PRIOR HISTORY: DM2. CKD4. Hypertension. CVA. GERD. Dementia.    PROCEDURE: The procedure was performed at the bedside. This was a routine study. The transthoracic approach was used. The study included complete 2D imaging, M-mode, complete spectral Doppler, and color Doppler. The heart rate was 98 bpm,  at the start of the study. Image quality was adequate.    LEFT VENTRICLE: Size was normal. Systolic function was normal by visual assessment. Ejection fraction was estimated to be 60 %. There were no regional wall motion abnormalities. Wall thickness was moderately increased. DOPPLER: The ratio  of early  ventricular filling to atrial contraction velocities was within the normal range. Features were consistent with a pseudonormal left ventricular filling pattern, with concomitant abnormal relaxation and increased filling pressure  (grade 2 diastolic dysfunction).    RIGHT VENTRICLE: The size was normal. Systolic function was normal. DOPPLER: Systolic pressure was not estimated.    LEFT ATRIUM: The atrium was mildly dilated.    RIGHT ATRIUM: The atrium was mildly dilated.    MITRAL VALVE: There was moderate annular calcification. Valve structure was normal. There was normal leaflet separation. DOPPLER: The transmitral velocity was within the normal range. There was no evidence for stenosis. There was mild  regurgitation.    AORTIC VALVE: The valve was trileaflet. Leaflets exhibited normal thickness, moderate calcification, and moderately reduced cuspal separation. DOPPLER: Transaortic velocity was increased due to valvular stenosis. There was mild to moderate  stenosis. There was mild regurgitation.    TRICUSPID VALVE: The valve structure was normal. There was normal leaflet separation. DOPPLER: The transtricuspid velocity was within the normal range. There was no evidence for stenosis. There was trace regurgitation.    PULMONIC VALVE: Leaflets exhibited normal thickness, no calcification, and normal cuspal separation. DOPPLER: The transpulmonic velocity was within the normal range. There was mild regurgitation.    PERICARDIUM: There was no pericardial effusion.    AORTA: The root exhibited normal size.    SYSTEMIC VEINS: IVC: The inferior vena cava was normal in size and course. Respirophasic changes were normal.    SYSTEM MEASUREMENT TABLES    2D  %FS: 36.09 %  Ao Diam: 3.8 cm  EDV(Teich): 182.77 ml  EF(Teich): 64.8 %  ESV(Teich): 64.33 ml  IVSd: 1.57 cm  LA Area: 24.31 cm2  LA Diam: 4.63 cm  LVEDV MOD A4C: 192.34 ml  LVEF MOD A4C: 65.32 %  LVESV MOD A4C: 66.7 ml  LVIDd: 6.04 cm  LVIDs: 3.86 cm  LVLd A4C: 9.02  cm  LVLs A4C: 7.15 cm  LVOT Diam: 2.21 cm  LVPWd: 1.61 cm  RA Area: 17.56 cm2  RVIDd: 4 cm  SV MOD A4C: 125.64 ml  SV(Teich): 118.44 ml    CW  AV Env.Ti: 330.16 ms  AV MaxP.64 mmHg  AV VTI: 57.9 cm  AV Vmax: 2.58 m/s  AV Vmean: 1.76 m/s  AV meanP.38 mmHg  TR MaxP.37 mmHg  TR Vmax: 2.71 m/s    MM  TAPSE: 1.84 cm    PW  FATUMA (VTI): 1.27 cm2  FATUMA Vmax: 1.42 cm2  AVAI (VTI): 0 cm2/m2  AVAI Vmax: 0 cm2/m2  E' Sept: 0.07 m/s  E/E' Sept: 12.69  LVOT Env.Ti: 335.87 ms  LVOT VTI: 19.11 cm  LVOT Vmax: 0.95 m/s  LVOT Vmean: 0.57 m/s  LVOT maxPG: 3.63 mmHg  LVOT meanP.61 mmHg  LVSI Dopp: 32.42 ml/m2  LVSV Dopp: 73.27 ml  MV A Carlin: 0.64 m/s  MV Dec Richardson: 4.7 m/s2  MV DecT: 189.67 ms  MV E Carlin: 0.89 m/s  MV E/A Ratio: 1.38  MV PHT: 55.01 ms  MVA By PHT: 4 cm2    [employer] Accredited Echocardiography Laboratory    Prepared and electronically signed by    Sancho Galvan MD  Signed 06-Oct-2020 16:31:49            LABS:  Lab Results   Component Value Date    GLUCOSE 92 2023    BUN 69 (H) 2024    CREATININE 8.72 (H) 2024    CALCIUM 8.0 (L) 2024     08/10/2016    K 6.1 (H) 2024    CO2 20 (L) 2024     2024    ALKPHOS 92 2024    BILITOT 0.6 08/10/2016    PROT 6.7 08/10/2016    AST 11 (L) 2024    ALT 9 2024    ANIONGAP 9 2016        Lab Results   Component Value Date    WBC 3.86 (L) 2024    HGB 10.0 (L) 2024    HCT 31.4 (L) 2024    MCV 97 2024    PLT 95 (L) 2024       Lab Results   Component Value Date    CHOL 203 (H) 08/10/2016    HDL 30 (L) 2023    LDLCALC 21 2023    TRIG 123 2023       Lab Results   Component Value Date    HGBA1C 5.1 01/10/2024         ASSESSMENT/PLAN:  Diagnoses and all orders for this visit:    Coronary artery disease involving native coronary artery of native heart without angina pectoris    S/P coronary artery stent placement    Renovascular  "hypertension    Mixed hyperlipidemia    ESRD (end stage renal disease) (HCC)    Ischemic cardiomyopathy    Antiplatelet or antithrombotic long-term use    63-year-old male presenting for follow-up evaluation.  Accompanied to the office visit today by his son Sam.  He is doing very well overall, remains fully compliant with all medications including dual antiplatelet therapy aspirin and prasugrel.  We discussed his GDMT regimen for ischemic cardiomyopathy, which presently consists of maximally tolerated Toprol-XL and once daily Entresto.  We again in office today reviewed the indication for considering SGLT2 inhibitor therapy, particularly based upon new trial data such as the data kidney trial suggesting safety margin in this patient population.  I recommend to the patient that he discuss this with his nephrologist in anticipation for considering initiation of this therapy in the near future.  Through shared decision-making approach, we opted to hold off on initiating therapy at this time particularly given poorly healing lower extremity wound.  He will follow-up with me in 3 months for routine reevaluation.  Patient prefers regular 3-month follow-ups.          Britney Tan MD      Portions of the record may have been created with voice recognition software. Occasional wrong word or \"sound alike\" substitutions may have occurred due to the inherent limitations of voice recognition software. Please review the chart carefully and recognize, using context, where substitutions/typographical errors may have occurred.     "

## 2024-02-08 NOTE — PATIENT INSTRUCTIONS
Orders Placed This Encounter   Procedures    Wound cleansing and dressings Diabetic Ulcer Left Heel     Wound location left heel  Change dressing daily  The dressing must stay dry and intact. You may shower with dressing protected by cast cover or bag. Or you may sponge bathe. Call wound center or home health nurse if dressing becomes wet.   Apply 1/4 strength dakins moistened gauze to wound  Cover with ABD  Secure with rolled gauze and tape.     This was applied today at the [facility].    Please consider contacting vascular to see if there are any options for revascularization.  If the leg can be revascularized there is a chance at healing the wound.   Return NPWT to [facility] will assist with that process.     Standing Status:   Future     Standing Expiration Date:   2/8/2025    Wound off loading Diabetic Ulcer Left Heel     Continue offloading the wound with heel relief shoe when going to appointments.  Continue to offload with prevalon heel medic boot when in bed, if it does not stay in place then try pillow under knee and lower leg to float the heel.     Standing Status:   Future     Standing Expiration Date:   2/8/2025

## 2024-02-08 NOTE — PATIENT INSTRUCTIONS
You were seen today in the Cardiology office for follow up evaluation.     Please continue your current cardiac medications as prescribed.    Thank you for choosing [facility].    Please call our office or use [facility] with any questions.

## 2024-02-08 NOTE — PROGRESS NOTES
"Patient ID: Asad Galloway is a 63 y.o. male Date of Birth 1960     Chief Complaint  Chief Complaint   Patient presents with    Follow Up Wound Care Visit     L heel       Allergies  Patient has no known allergies.    Assessment:    No problem-specific Assessment & Plan notes found for this encounter.       Diagnoses and all orders for this visit:    Ulcer of heel, left, with necrosis of muscle (HCC)  -     lidocaine (LMX) 4 % cream  -     Wound cleansing and dressings Diabetic Ulcer Left Heel; Future  -     Wound off loading Diabetic Ulcer Left Heel; Future  -     Debridement    Type 2 diabetes mellitus with chronic kidney disease on chronic dialysis, without long-term current use of insulin (HCC)  -     Debridement    Peripheral arterial disease (HCC)    Other orders  -     Cancel: Debridement        Debridement    Universal Protocol:  Consent: Verbal consent obtained.  Risks and benefits: risks, benefits and alternatives were discussed  Consent given by: patient  Time out: Immediately prior to procedure a \"time out\" was called to verify the correct patient, procedure, equipment, support staff and site/side marked as required.  Timeout called at: 2/8/2024 10:30 AM.  Patient understanding: patient states understanding of the procedure being performed  Patient identity confirmed: verbally with patient    Debridement Details  Performed by: physician  Debridement type: surgical  Level of debridement: muscle  Pain control: lidocaine 4%      Post-debridement measurements  Length (cm): 1.3  Width (cm): 1.5  Depth (cm): 0.9  Percent debrided: 100%  Surface Area (cm^2): 1.95  Area Debrided (cm^2): 1.95  Volume (cm^3): 1.76    Tissue and other material debrided: dermis, epidermis, fascia and subcutaneous tissue  Devitalized tissue debrided: biofilm, exudate, fibrin, necrotic debris and slough  Instrument(s) utilized: blade and nippers  Bleeding: small  Hemostasis obtained with: pressure  Procedural pain (0-10): " 0  Post-procedural pain: 0   Response to treatment: procedure was tolerated well        Plan:  Reviewed medical records from previous admission including images, podiatry notes, vascular notes, and labs.  Discussed his condition and prognosis.    He and his son refused a-gram, OR debridement of wound, nor placement to optimize the care with wound VAC.  He is not compliant about keeping the protective boot / off-loading.  He is a poor candidate for wound VAC therapy at home either with high risk for infection and fall.  I told them if his goal is to have renal transplant as soon as possible, it would have the leg amputation and have renal transplant.  I told him his would would not heal as is.  Recommended him to see vascular surgery if his goal is to salvage is foot.    He has been taking off Prevalon boot and wound VAC at home.  Will hold wound VAC.  Dakin solution to left heel wound bid.  Discussed with him and his son that patient compliance would absolutely be required to help with his condition.         I have spent a total time of 30 minutes on 02/09/24 in caring for this patient including Diagnostic results, Prognosis, Risks and benefits of tx options, Instructions for management, Patient and family education, Importance of tx compliance, Risk factor reductions, Impressions, Counseling / Coordination of care, and Reviewing / ordering tests, medicine, procedures  .       Wound 06/02/23 Diabetic Ulcer Heel Left (Active)   Enter Oliveros score: Oliveros Grade 2: Deep ulcer extended to ligament, tendon, joint capsule, bone, or deep fascia without abscess or osteomyelitis (OM) 02/08/24 0908   Wound Image Images linked 02/08/24 0934   Wound Description Yellow;Gray;Slough;Pink 02/08/24 0908   Lani-wound Assessment Intact;Maceration 02/08/24 0908   Wound Length (cm) 1.2 cm 02/08/24 0908   Wound Width (cm) 1.5 cm 02/08/24 0908   Wound Depth (cm) 0.9 cm 02/08/24 0908   Wound Surface Area (cm^2) 1.8 cm^2 02/08/24 0908    Wound Volume (cm^3) 1.62 cm^3 02/08/24 0908   Calculated Wound Volume (cm^3) 1.62 cm^3 02/08/24 0908   Undermining 1 0 02/08/24 0908   Undermining 1 is depth extending from none 02/08/24 0908   Drainage Amount Moderate 02/08/24 0908   Drainage Description Serosanguineous;Foul smelling;Yellow 02/08/24 0908   Non-staged Wound Description Full thickness 02/08/24 0908   Dressing Status Intact 02/08/24 0908       Wound 06/02/23 Diabetic Ulcer Heel Left (Active)   Date First Assessed/Time First Assessed: 06/02/23 1908   Primary Wound Type: Diabetic Ulcer  Location: Heel  Wound Location Orientation: Left  Dressing Status: Clean;Dry;Intact       [REMOVED] Wound 01/29/23 Abrasion(s) Pretibial Right (Removed)   Resolved Date/Resolved Time: 12/21/23 0834  Date First Assessed/Time First Assessed: 01/29/23 1218   Traumatic Wound Type: Abrasion(s)  Location: Pretibial  Wound Location Orientation: Right  Wound Description (Comments): Scabbed  Wound Outcome: Other...       [REMOVED] Wound 02/01/23 Wrist Anterior;Right (Removed)   Resolved Date: 12/26/23  Date First Assessed/Time First Assessed: 02/01/23 1806   Location: Wrist  Wound Location Orientation: Anterior;Right       [REMOVED] Wound 02/01/23 Skin Tear Abrasion(s) Arm Left;Posterior;Proximal (Removed)   Resolved Date: 12/26/23  Date First Assessed/Time First Assessed: 02/01/23 1930   Primary Wound Type: Skin Tear  Traumatic Wound Type: Abrasion(s)  Location: Arm  Wound Location Orientation: Left;Posterior;Proximal       [REMOVED] Wound 09/29/23 Groin Right (Removed)   Resolved Date: 12/26/23  Date First Assessed/Time First Assessed: 09/29/23 0957   Location: Groin  Wound Location Orientation: Right  Wound Description (Comments): New Puncture site noted   Incision's 1st Dressing: ADHESIVE SKIN HIGH VISCOSITY EXOFIN ...       Subjective:          HPI    The patient presents for evaluation of left heel ulcer.  He was discharged with wound VAC, but it does not usually stay.   He pulls it off or falls off.  It tangles around his foot.  He does not wear Prevalon boot.  He complained of pain in left heel and cried.      The following portions of the patient's history were reviewed and updated as appropriate: allergies, current medications, past family history, past medical history, past social history, past surgical history, and problem list.      PAST MEDICAL HISTORY:  Past Medical History:   Diagnosis Date    Cerebrovascular accident (CVA) due to thrombosis of left middle cerebral artery (HCC) 07/29/2018    Chronic kidney disease     Coronary artery disease     Diabetes mellitus (HCC)     not on meds    Dialysis patient ([facility])     M-W-F    Fistula     left upper arm for hemodialyis    GERD (gastroesophageal reflux disease)     History of coronary artery stent placement     x3    Hypercholesteremia     Hyperlipidemia     Hypertension     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    Limb alert care status     no BP/IV left arm    Neuropathy     Obesity     Osteomyelitis ([facility])     last assessed 11/4/16-per son not currently    PVC's (premature ventricular contractions)     sees [facility]    Stroke ([facility])     last weeof July 2018 [facility]    TIA (transient ischemic attack) 10/28/2018    Wears dentures     Wears glasses        PAST SURGICAL HISTORY:  Past Surgical History:   Procedure Laterality Date    ABDOMINAL SURGERY      CARDIAC CATHETERIZATION N/A 05/02/2022    Procedure: Cardiac Coronary Angiogram;  Surgeon: Sam Davis MD;  [facility];  Service: Cardiology    CARDIAC CATHETERIZATION N/A 05/02/2022    Procedure: Cardiac pci;  Surgeon: Sam Davis MD;  [facility];  Service: Cardiology    CARDIAC CATHETERIZATION  02/01/2023    Procedure: Cardiac catheterization;  Surgeon: Sam Davis MD;  [facility]: [facility];  Service: Cardiology    CARDIAC CATHETERIZATION N/A 02/01/2023    Procedure: Cardiac pci;  Surgeon: Sam Davis MD;  " Location: [facility];  Service: Cardiology    CARDIAC CATHETERIZATION N/A 02/01/2023    Procedure: Cardiac Coronary Angiogram;  Surgeon: Sam Davis MD;  [facility];  Service: Cardiology    CARDIAC CATHETERIZATION N/A 02/01/2023    Procedure: Cardiac other-IVUS;  Surgeon: Sam Davis MD;  [facility];  Service: Cardiology    CHOLECYSTECTOMY      Percutaneous    COLONOSCOPY      CYSTOSCOPY      HEMODIALYSIS ADULT  1/22/2024    HEMODIALYSIS ADULT  1/24/2024    IR LOWER EXTREMITY ANGIOGRAM  9/29/2023    OTHER SURGICAL HISTORY      \"stimulator to control bowel movements\"    NE ESOPHAGOGASTRODUODENOSCOPY TRANSORAL DIAGNOSTIC N/A 09/27/2016    Procedure: ESOPHAGOGASTRODUODENOSCOPY (EGD);  Surgeon: Adele Rowe MD;  Location: [facility] GI LAB;  Service: Gastroenterology    NE LAPAROSCOPY SURG CHOLECYSTECTOMY N/A 02/29/2016    Procedure: LAPAROSCOPIC CHOLECYSTECTOMY ;  Surgeon: Cliff Roman DO;  Location: [facility] Main [facility];  Service: General    NE SLCTV CATHJ 3RD+ ORD SLCTV ABDL PEL/LXTR BRNCH Left 9/29/2023    Procedure: Left leg angiogram with intervention;  Surgeon: Michelle Galvan MD;  Location: [facility] MAIN [facility];  Service: Vascular    ROTATOR CUFF REPAIR Right     SPINAL CORD STIMULATOR IMPLANT      \"Medtronic interstim model # 3058- in lower back to control bowel movements-currently turned off-battery is dead\"    TOE AMPUTATION Right 10/28/2016    Procedure: 3RD TOE AMPUTATION ;  Surgeon: Anjel Salas DPM;  Location: [facility];  Service:         ALLERGIES:  Patient has no known allergies.    MEDICATIONS:  Current Outpatient Medications   Medication Sig Dispense Refill    aspirin (ECOTRIN LOW STRENGTH) 81 mg EC tablet Take 81 mg by mouth daily Resume on 8/14        atorvastatin (LIPITOR) 40 mg tablet TAKE 1 TABLET BY MOUTH DAILY WITH DINNER 90 tablet 1    Blood Glucose Monitoring Suppl (True Metrix Meter) w/Device KIT Use to test blood sugars 3 times daily 1 kit 0    Blood " Pressure Monitoring (BLOOD PRESSURE CUFF) MISC Use to check blood pressure before taking blood pressure medication and 1 hour after and follow instructions provided in discharge instructions based on the readings. 1 each 0    Cholecalciferol (Vitamin D3) 1.25 MG (05298 UT) CAPS TAKE 1 CAPSULE BY MOUTH ONE TIME PER WEEK 12 capsule 3    divalproex sodium (DEPAKOTE SPRINKLE) 125 MG capsule TAKE 2 CAPSULES (250 MG TOTAL) BY MOUTH EVERY 12 (TWELVE) HOURS (Patient taking differently: Take 125 mg by mouth every 12 (twelve) hours) 360 capsule 1    glucose blood (True Metrix Blood Glucose Test) test strip Use 1 each 3 (three) times a day Use as instructed 300 strip 1    Lancets MISC Use 3 (three) times a day Use 3 times daily 300 each 0    metoprolol succinate (TOPROL-XL) 50 mg 24 hr tablet Take 1 tablet (50 mg total) by mouth 2 (two) times a day 180 tablet 3    prasugrel (EFFIENT) tablet Take 1 tablet (5 mg total) by mouth daily (Patient taking differently: Take 5 mg by mouth daily Takes with pudding) 90 tablet 3    sacubitril-valsartan (Entresto) 24-26 MG TABS Take 1 tablet by mouth in the morning Bedtime 90 tablet 3    Sevelamer Carbonate 2.4 g PACK VIGOROUSLY MIX CONTENTS OF 1 PACKET IN WATER (IT WILL NOT DISSOLVE) AND DRINK 3 TIMES DAILY WITH MEALS      sodium hypochlorite (DAKIN'S HALF-STRENGTH) external solution Apply 1 Application topically every other day At each dressing change 473 mL 0     No current facility-administered medications for this visit.       SOCIAL HISTORY:  Social History     Socioeconomic History    Marital status: /Civil Union     Spouse name: None    Number of children: None    Years of education: None    Highest education level: Not asked   Occupational History    Occupation: disabled   Tobacco Use    Smoking status: Never    Smokeless tobacco: Never   Vaping Use    Vaping status: Never Used   Substance and Sexual Activity    Alcohol use: Never    Drug use: No    Sexual activity: Not  Currently     Partners: Female     Comment: defer   Other Topics Concern    None   Social History Narrative    Daily caffeine consumption 2-3 servings a day     Social Determinants of Health     Financial Resource Strain: Patient Declined (7/13/2023)    Overall Financial Resource Strain (CARDIA)     Difficulty of Paying Living Expenses: Patient declined   Food Insecurity: No Food Insecurity (1/19/2024)    Hunger Vital Sign     Worried About Running Out of Food in the Last Year: Never true     Ran Out of Food in the Last Year: Never true   Transportation Needs: No Transportation Needs (1/19/2024)    PRAPARE - Transportation     Lack of Transportation (Medical): No     Lack of Transportation (Non-Medical): No   Physical Activity: Not on file   Stress: No Stress Concern Present (1/18/2019)    [employer] [place] [employer] - Occupational Stress Questionnaire     Feeling of Stress : Only a little   Social Connections: Unknown (1/18/2019)    Social Connection and Isolation Panel [NHANES]     Frequency of Communication with Friends and Family: Not on file     Frequency of Social Gatherings with Friends and Family: Not on file     Attends Restorationism Services: Not on file     Active Member of Clubs or Organizations: Not on file     Attends Club or Organization Meetings: Not on file     Marital Status:    Intimate Partner Violence: Not At Risk (1/18/2019)    Humiliation, Afraid, Rape, and Kick questionnaire     Fear of Current or Ex-Partner: No     Emotionally Abused: No     Physically Abused: No     Sexually Abused: No   Housing Stability: Low Risk  (1/19/2024)    Housing Stability Vital Sign     Unable to Pay for Housing in the Last Year: No     Number of Places Lived in the Last Year: 1     Unstable Housing in the Last Year: No        Review of Systems   Constitutional:  Negative for chills and fever.   Respiratory:  Negative for cough and shortness of breath.    Cardiovascular:  Negative for chest pain.    Gastrointestinal:  Negative for nausea and vomiting.   Skin:  Positive for wound.   Neurological:  Positive for numbness. Negative for weakness.         Objective:       Wound 06/02/23 Diabetic Ulcer Heel Left (Active)   Enter Oliveros score: Oliveros Grade 2: Deep ulcer extended to ligament, tendon, joint capsule, bone, or deep fascia without abscess or osteomyelitis (OM) 02/08/24 0908   Wound Image Images linked 02/08/24 0934   Wound Description Yellow;Gray;Slough;Pink 02/08/24 0908   Lani-wound Assessment Intact;Maceration 02/08/24 0908   Wound Length (cm) 1.2 cm 02/08/24 0908   Wound Width (cm) 1.5 cm 02/08/24 0908   Wound Depth (cm) 0.9 cm 02/08/24 0908   Wound Surface Area (cm^2) 1.8 cm^2 02/08/24 0908   Wound Volume (cm^3) 1.62 cm^3 02/08/24 0908   Calculated Wound Volume (cm^3) 1.62 cm^3 02/08/24 0908   Undermining 1 0 02/08/24 0908   Undermining 1 is depth extending from none 02/08/24 0908   Drainage Amount Moderate 02/08/24 0908   Drainage Description Serosanguineous;Foul smelling;Yellow 02/08/24 0908   Non-staged Wound Description Full thickness 02/08/24 0908   Dressing Status Intact 02/08/24 0908       /55   Pulse 66   Temp (!) 96.9 °F (36.1 °C)   Resp 18     Physical Exam  Vitals and nursing note reviewed.   Constitutional:       General: He is not in acute distress.     Appearance: He is not toxic-appearing or diaphoretic.   Cardiovascular:      Rate and Rhythm: Normal rate and regular rhythm.      Pulses:           Dorsalis pedis pulses are 1+ on the left side.        Posterior tibial pulses are detected w/ Doppler on the left side.   Pulmonary:      Effort: Pulmonary effort is normal. No respiratory distress.   Musculoskeletal:         General: No signs of injury.      Right foot: No foot drop.      Left foot: No foot drop.   Feet:      Comments: Biphasic PTA left.  Skin:     General: Skin is warm.      Capillary Refill: Capillary refill takes less than 2 seconds.      Coloration: Skin is not  cyanotic or mottled.      Findings: No abscess or erythema.      Nails: There is no clubbing.      Comments: Oliveros 2 DFU noted left heel.  Wound bed is fibrous.  No purulence or redness.  No signs of active infection.  Wound depth is closed to periosteal tissues.  See wound assessment and photo.     Neurological:      General: No focal deficit present.      Mental Status: He is alert and oriented to person, place, and time.      Cranial Nerves: No cranial nerve deficit.      Sensory: No sensory deficit.      Motor: No weakness.      Coordination: Coordination normal.   Psychiatric:         Mood and Affect: Mood normal.         Behavior: Behavior normal.         Thought Content: Thought content normal.         Judgment: Judgment normal.           Wound Instructions:  Orders Placed This Encounter   Procedures    Wound cleansing and dressings Diabetic Ulcer Left Heel     Wound location left heel  Change dressing daily  The dressing must stay dry and intact. You may shower with dressing protected by cast cover or bag. Or you may sponge bathe. Call wound center or home health nurse if dressing becomes wet.   Apply 1/4 strength dakins moistened gauze to wound  Cover with ABD  Secure with rolled gauze and tape.     This was applied today at the [facility].    Please consider contacting vascular to see if there are any options for revascularization.  If the leg can be revascularized there is a chance at healing the wound.   Return NPWT to [facility] will assist with that process.     Standing Status:   Future     Standing Expiration Date:   2/8/2025    Wound off loading Diabetic Ulcer Left Heel     Continue offloading the wound with heel relief shoe when going to appointments.  Continue to offload with prevalon heel medic boot when in bed, if it does not stay in place then try pillow under knee and lower leg to float the heel.     Standing Status:   Future     Standing Expiration Date:   2/8/2025    Debridement     This  order was created via procedure documentation        Diagnosis ICD-10-CM Associated Orders   1. Ulcer of heel, left, with necrosis of muscle ([facility])  L97.423 lidocaine (LMX) 4 % cream     Wound cleansing and dressings Diabetic Ulcer Left Heel     Wound off loading Diabetic Ulcer Left Heel     Debridement      2. Type 2 diabetes mellitus with chronic kidney disease on chronic dialysis, without long-term current use of insulin ([facility])  E11.22 Debridement    N18.6     Z99.2       3. Peripheral arterial disease ([facility])  I73.9

## 2024-02-10 ENCOUNTER — HOME CARE VISIT (OUTPATIENT)
Dept: HOME HEALTH SERVICES | Facility: HOME HEALTHCARE | Age: 64
End: 2024-02-10
Payer: MEDICARE

## 2024-02-10 VITALS
OXYGEN SATURATION: 96 % | TEMPERATURE: 98.2 F | SYSTOLIC BLOOD PRESSURE: 122 MMHG | DIASTOLIC BLOOD PRESSURE: 70 MMHG | RESPIRATION RATE: 18 BRPM | HEART RATE: 76 BPM

## 2024-02-10 PROCEDURE — G0300 HHS/HOSPICE OF LPN EA 15 MIN: HCPCS

## 2024-02-13 ENCOUNTER — HOME CARE VISIT (OUTPATIENT)
Dept: HOME HEALTH SERVICES | Facility: HOME HEALTHCARE | Age: 64
End: 2024-02-13
Payer: MEDICARE

## 2024-02-13 VITALS
TEMPERATURE: 97.2 F | DIASTOLIC BLOOD PRESSURE: 60 MMHG | HEART RATE: 64 BPM | RESPIRATION RATE: 16 BRPM | SYSTOLIC BLOOD PRESSURE: 108 MMHG | OXYGEN SATURATION: 98 %

## 2024-02-13 PROCEDURE — G0299 HHS/HOSPICE OF RN EA 15 MIN: HCPCS

## 2024-02-15 ENCOUNTER — OFFICE VISIT (OUTPATIENT)
Dept: VASCULAR SURGERY | Facility: CLINIC | Age: 64
End: 2024-02-15
Payer: MEDICARE

## 2024-02-15 VITALS
DIASTOLIC BLOOD PRESSURE: 62 MMHG | BODY MASS INDEX: 33.59 KG/M2 | WEIGHT: 209 LBS | SYSTOLIC BLOOD PRESSURE: 94 MMHG | HEIGHT: 66 IN | HEART RATE: 70 BPM

## 2024-02-15 DIAGNOSIS — I70.244 ATHEROSCLEROSIS OF NATIVE ARTERY OF LEFT LOWER EXTREMITY WITH ULCERATION OF HEEL (HCC): Primary | Chronic | ICD-10-CM

## 2024-02-15 PROCEDURE — 99215 OFFICE O/P EST HI 40 MIN: CPT | Performed by: SURGERY

## 2024-02-15 RX ORDER — CHLORHEXIDINE GLUCONATE ORAL RINSE 1.2 MG/ML
15 SOLUTION DENTAL ONCE
OUTPATIENT
Start: 2024-02-16 | End: 2024-02-16

## 2024-02-15 NOTE — ASSESSMENT & PLAN NOTE
Severe PAD with occlusion of distal PT.  Prior A -gram from Sept 2023 was reviewed in detail with pateint and family.  There is good runoff via anterior tibial but hte PT is occluded and he is not receiving enough circulation to heal the area of the wound.    So we would need to consider angiogram with intervention with phoenix microcrown and atherectomy and angioplasty.    Risks of procedure such as bleeding, vessel occlusion were discussed.    Operative Scheduling Information:    Hospital:  [facility]    Physician:  Mandy    Surgery: Left Antegrade puncutre, left leg angigoram, atherectomy of posterior tibial artery and necessary procedures    Urgency:  Standard    Level:  Level 4: Outpatients to be scheduled for screening procedures and elective surgery that can be delayed for longer than one month without reasonable expectation of detriment to patient.    Case Length:  Normal    Post-op Bed:  Outpatient    OR Table:  [facility]    Equipment Needs:  Rep: phoenix micro atherectomy    Medication Instructions:  Aspirin:   Continue (do not hold)  Effient:  Continue (do not hold)    Hydration:  No    Contrast Allergy:  no

## 2024-02-15 NOTE — H&P (VIEW-ONLY)
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  Prior A -gram from Sept 2023 was reviewed in detail with pateint and family.  There is good runoff via anterior tibial but hte PT is occluded and he is not receiving enough circulation to heal the area of the wound.    So we would need to consider angiogram with intervention with phoenix microcrown and atherectomy and angioplasty.    Risks of procedure such as bleeding, vessel occlusion were discussed.    Operative Scheduling Information:    Hospital:  [facility]    Physician:  [facility]    Surgery: Left Antegrade puncutre, left leg angigoram, atherectomy of posterior tibial artery and necessary procedures    Urgency:  Standard    Level:  Level 4: Outpatients to be scheduled for screening procedures and elective surgery that can be delayed for longer than one month without reasonable expectation of detriment to patient.    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 Routine, evening prior to, and day of surgery.; Standing  -     Nursing Communication Swab both nares with Povidone-Iodine solution, EXCLUDE if patient has shellfish/Iodine allergy, and replace with nasal alcohol swabstick. Routine, day of surgery, on call to OR.; Standing  -     chlorhexidine (PERIDEX) 0.12 % oral rinse 15 mL  -     Shower/scrub; Standing  -     Place sequential compression device; Standing          Subjective:      Patient ID: Asad Galloway is a 63 y.o. male.    Patient presents for wound check on L heel. He reports slight improvement in size of wound. He reports some clear drainage and pain in L heel. Pt has a visiting nurse T-Th-S for wound care. He is taking ASA81 and Atorvastatin and is not a smoker.     Wound Check        The following portions of the patient's history were reviewed and updated as appropriate: allergies, current medications, past family history, past medical history, past social history, past surgical history, and problem list.    Review of Systems   Cardiovascular: Negative.    Musculoskeletal: Negative.    Skin:  Positive for wound.   Neurological: Negative.    All other systems reviewed and are negative.      I have reviewed the ROS as entered and made changes as necessary.    Objective:      BP 94/62 (BP Location: Right arm, Patient Position: Sitting, Cuff Size: Standard)   Pulse 70   Ht 5' 6\" (1.676 m)   Wt 94.8 kg (209 lb)   BMI 33.73 kg/m²          Physical Exam  Vitals and nursing note reviewed.   Constitutional:       Appearance: Normal appearance. He is ill-appearing.   HENT:      Head: Normocephalic and atraumatic.   Cardiovascular:      Rate and Rhythm: Normal rate and regular rhythm.      Heart sounds: Normal heart sounds.      Comments: Left lower extremity triphasic anterior tibial and dorsalis pedis signals.  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  Pulmonary:      Effort: Pulmonary effort is normal.      Breath sounds: Normal breath sounds.   Abdominal:      Palpations: Abdomen is soft.   Musculoskeletal:      Right lower leg: No edema.      Left lower leg: No edema.   Skin:     Capillary Refill: Capillary refill takes less than 2 seconds.      Coloration: Skin is not jaundiced.      Comments: Heel ulcer on left ankle   Neurological:      General: No focal deficit present.      Mental Status: He is alert and oriented to person, place, and time.   Psychiatric:         Mood and Affect: Mood normal. Affect is tearful.         Behavior: Behavior normal.

## 2024-02-15 NOTE — PROGRESS NOTES
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  Prior A -gram from Sept 2023 was reviewed in detail with pateint and family.  There is good runoff via anterior tibial but hte PT is occluded and he is not receiving enough circulation to heal the area of the wound.    So we would need to consider angiogram with intervention with phoenix microcrown and atherectomy and angioplasty.    Risks of procedure such as bleeding, vessel occlusion were discussed.    Operative Scheduling Information:    Hospital:  [facility]    Physician:  [facility]    Surgery: Left Antegrade puncutre, left leg angigoram, atherectomy of posterior tibial artery and necessary procedures    Urgency:  Standard    Level:  Level 4: Outpatients to be scheduled for screening procedures and elective surgery that can be delayed for longer than one month without reasonable expectation of detriment to patient.    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 Routine, evening prior to, and day of surgery.; Standing  -     Nursing Communication Swab both nares with Povidone-Iodine solution, EXCLUDE if patient has shellfish/Iodine allergy, and replace with nasal alcohol swabstick. Routine, day of surgery, on call to OR.; Standing  -     chlorhexidine (PERIDEX) 0.12 % oral rinse 15 mL  -     Shower/scrub; Standing  -     Place sequential compression device; Standing          Subjective:      Patient ID: Asad Galloway is a 63 y.o. male.    Patient presents for wound check on L heel. He reports slight improvement in size of wound. He reports some clear drainage and pain in L heel. Pt has a visiting nurse T-Th-S for wound care. He is taking ASA81 and Atorvastatin and is not a smoker.     Wound Check        The following portions of the patient's history were reviewed and updated as appropriate: allergies, current medications, past family history, past medical history, past social history, past surgical history, and problem list.    Review of Systems   Cardiovascular: Negative.    Musculoskeletal: Negative.    Skin:  Positive for wound.   Neurological: Negative.    All other systems reviewed and are negative.      I have reviewed the ROS as entered and made changes as necessary.    Objective:      BP 94/62 (BP Location: Right arm, Patient Position: Sitting, Cuff Size: Standard)   Pulse 70   Ht 5' 6\" (1.676 m)   Wt 94.8 kg (209 lb)   BMI 33.73 kg/m²          Physical Exam  Vitals and nursing note reviewed.   Constitutional:       Appearance: Normal appearance. He is ill-appearing.   HENT:      Head: Normocephalic and atraumatic.   Cardiovascular:      Rate and Rhythm: Normal rate and regular rhythm.      Heart sounds: Normal heart sounds.      Comments: Left lower extremity triphasic anterior tibial and dorsalis pedis signals.  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  Pulmonary:      Effort: Pulmonary effort is normal.      Breath sounds: Normal breath sounds.   Abdominal:      Palpations: Abdomen is soft.   Musculoskeletal:      Right lower leg: No edema.      Left lower leg: No edema.   Skin:     Capillary Refill: Capillary refill takes less than 2 seconds.      Coloration: Skin is not jaundiced.      Comments: Heel ulcer on left ankle   Neurological:      General: No focal deficit present.      Mental Status: He is alert and oriented to person, place, and time.   Psychiatric:         Mood and Affect: Mood normal. Affect is tearful.         Behavior: Behavior normal.

## 2024-02-16 ENCOUNTER — TELEPHONE (OUTPATIENT)
Dept: VASCULAR SURGERY | Facility: CLINIC | Age: 64
End: 2024-02-16

## 2024-02-16 ENCOUNTER — HOME CARE VISIT (OUTPATIENT)
Dept: HOME HEALTH SERVICES | Facility: HOME HEALTHCARE | Age: 64
End: 2024-02-16
Payer: MEDICARE

## 2024-02-16 NOTE — TELEPHONE ENCOUNTER
Operative Scheduling Information:     Hospital:  [facility]     Physician:  [facility]     Surgery: Left Antegrade puncutre, left leg angigoram, atherectomy of posterior tibial artery and necessary procedures     Urgency:  Standard     Level:  Level 4: Outpatients to be scheduled for screening procedures and elective surgery that can be delayed for longer than one month without reasonable expectation of detriment to patient.     Case Length:  Normal     Post-op Bed:  Outpatient     OR Table:  Discovery     Equipment Needs:  Rep: phoenix micro atherectomy     Medication Instructions:  Aspirin:   Continue (do not hold)  Effient:  Continue (do not hold)     Hydration:  No     Contrast Allergy:  no

## 2024-02-16 NOTE — TELEPHONE ENCOUNTER
Patient only wants JOEY and only wants Dr. Galvan. Calling patients son back next week to discuss date.

## 2024-02-17 ENCOUNTER — HOME CARE VISIT (OUTPATIENT)
Dept: HOME HEALTH SERVICES | Facility: HOME HEALTHCARE | Age: 64
End: 2024-02-17
Payer: MEDICARE

## 2024-02-17 VITALS
HEART RATE: 52 BPM | RESPIRATION RATE: 18 BRPM | DIASTOLIC BLOOD PRESSURE: 75 MMHG | SYSTOLIC BLOOD PRESSURE: 122 MMHG | TEMPERATURE: 97.2 F | OXYGEN SATURATION: 100 %

## 2024-02-17 PROCEDURE — G0299 HHS/HOSPICE OF RN EA 15 MIN: HCPCS

## 2024-02-18 NOTE — CASE COMMUNICATION
Ship to . Home     Branch: Izzy Quinones MD: Franklin Church DPM (home health only)     Wound 1 left heel Full       Gauze Kerlix (fluff roll) 4inc sterile [id number]   ABD 5x9 [id number]

## 2024-02-19 ENCOUNTER — PREP FOR PROCEDURE (OUTPATIENT)
Dept: VASCULAR SURGERY | Facility: CLINIC | Age: 64
End: 2024-02-19

## 2024-02-19 NOTE — TELEPHONE ENCOUNTER
Authorization requirements reviewed.  Please refer to Auth / Cert number [id number] for case updates.    No authorization required

## 2024-02-19 NOTE — TELEPHONE ENCOUNTER
Verified patient's insurance   CONFIRMED - Patient's insurance is Medicare  Is patient requesting a call when authorization has been obtained? Patient did not request a call.    Surgery Date: 2/26/24  Primary Surgeon: SAKSHI // Michelle Galvan (NPI: [id number])  Assisting Surgeon: Not Applicable (N/A)  Facility: [facility] (Tax: [id number] / [id number]: [id number])  Inpatient / Outpatient: Outpatient  Level: 4    Clearance Received: No clearance ordered.  Consent Received: Yes, scanned into Epic on 2/16/24.  Medication Hold / Last Dose:  No hold on ASA or Effient  IR Notified: Not Applicable (N/A)  Rep. Notified:  [facility] - Phoenix micro atherectomy notified 2/19/24  Equipment Needs: Not Applicable (N/A)  Vas Lab Requested: Not Applicable (N/A)  Patient Contacted: 2/19/24    Diagnosis: I70.244  Procedure/ CPT Code(s): Angiogram // CPT: 92786, 75564, and 71611 // Procedure to take place in OR [Auth/ Cert Based]    For varicose vein related procedures:   Last LEVDR: Not Applicable (N/A)  CEAP Classification: Not Applicable (N/A)  VCSS: Not Applicable (N/A)    Post Operative Date/ Time: 3/20/24 , 245pm [place] with Michelle Galvan (NPI: [id number])     *Please review medication hold(s), PATs, and check H&P with patient.*  PATIENT WAS MAILED SURGERY/SHOWERING/DISCHARGE/COVID INSTRUCTIONS AFTER REVIEWING WITH THEM VIA PHONE CALL.

## 2024-02-20 ENCOUNTER — HOME CARE VISIT (OUTPATIENT)
Dept: HOME HEALTH SERVICES | Facility: HOME HEALTHCARE | Age: 64
End: 2024-02-20
Payer: MEDICARE

## 2024-02-21 ENCOUNTER — ANESTHESIA EVENT (OUTPATIENT)
Dept: PERIOP | Facility: HOSPITAL | Age: 64
End: 2024-02-21
Payer: MEDICARE

## 2024-02-21 NOTE — PRE-PROCEDURE INSTRUCTIONS
Pre-Surgery Instructions:   Medication Instructions    aspirin (ECOTRIN LOW STRENGTH) 81 mg EC tablet Take day of surgery.SON WILL GIVE HS PREOP-TAKES WITH PUDDING    atorvastatin (LIPITOR) 40 mg tablet Take night before surgery    Blood Glucose Monitoring Suppl (True Metrix Meter) w/Device KIT Instructions provided by MD    Blood Pressure Monitoring (BLOOD PRESSURE CUFF) MISC Instructions provided by MD    Cholecalciferol (Vitamin D3) 1.25 MG (85300 UT) CAPS Hold day of surgery.    divalproex sodium (DEPAKOTE SPRINKLE) 125 MG capsule Take day of surgery.SON WILL GIVE HS PREOP-TAKES WITH PUDDING    glucose blood (True Metrix Blood Glucose Test) test strip Instructions provided by MD Cervantes MISC Instructions provided by MD    metoprolol succinate (TOPROL-XL) 50 mg 24 hr tablet Take day of surgery.SON WILL GIVE HS PREOP-TAKES WITH PUDDING    prasugrel (EFFIENT) tablet Take day of surgery. SON WILL GIVE HS PREOP-takes with pudding    sacubitril-valsartan (Entresto) 24-26 MG TABS Take night before surgery    Sevelamer Carbonate 2.4 g PACK Hold day of surgery.    sodium hypochlorite (DAKIN'S HALF-STRENGTH) external solution Instructions provided by MD    Medication instructions for day surgery reviewed. Please use only a sip of water to take your instructed medications. Avoid all over the counter vitamins, supplements and NSAIDS for one week prior to surgery per anesthesia guidelines. Tylenol is ok to take as needed.     You will receive a call one business day prior to surgery with an arrival time and hospital directions. If your surgery is scheduled on a Monday, the hospital will be calling you on the Friday prior to your surgery. If you have not heard from anyone by 8pm, please call the hospital supervisor through the hospital  at [phone]. ([place] [phone] or [place] [phone]).    Do not eat or drink anything after midnight the night before your surgery, including candy, mints, lifesavers,  or chewing gum. Do not drink alcohol 24hrs before your surgery. Try not to smoke at least 24hrs before your surgery.       Follow the pre surgery showering instructions as listed in the “My Surgical Experience Booklet” or otherwise provided by your surgeon's office. Do not use a blade to shave the surgical area 1 week before surgery. It is okay to use a clean electric clippers up to 24 hours before surgery. Do not apply any lotions, creams, including makeup, cologne, deodorant, or perfumes after showering on the day of your surgery. Do not use dry shampoo, hair spray, hair gel, or any type of hair products.     No contact lenses, eye make-up, or artificial eyelashes. Remove nail polish, including gel polish, and any artificial, gel, or acrylic nails if possible. Remove all jewelry including rings and body piercing jewelry.     Wear causal clothing that is easy to take on and off. Consider your type of surgery.    Keep any valuables, jewelry, piercings at home. Please bring any specially ordered equipment (sling, braces) if indicated.    Arrange for a responsible person to drive you to and from the hospital on the day of your surgery. Please confirm the visitor policy for the day of your procedure when you receive your phone call with an arrival time.     Call the surgeon's office with any new illnesses, exposures, or additional questions prior to surgery.    Please reference your “My Surgical Experience Booklet” for additional information to prepare for your upcoming surgery.

## 2024-02-22 ENCOUNTER — HOME CARE VISIT (OUTPATIENT)
Dept: HOME HEALTH SERVICES | Facility: HOME HEALTHCARE | Age: 64
End: 2024-02-22
Payer: MEDICARE

## 2024-02-22 ENCOUNTER — APPOINTMENT (OUTPATIENT)
Dept: LAB | Facility: CLINIC | Age: 64
End: 2024-02-22
Payer: MEDICARE

## 2024-02-22 ENCOUNTER — OFFICE VISIT (OUTPATIENT)
Dept: WOUND CARE | Facility: HOSPITAL | Age: 64
End: 2024-02-22
Payer: MEDICARE

## 2024-02-22 VITALS
SYSTOLIC BLOOD PRESSURE: 141 MMHG | RESPIRATION RATE: 12 BRPM | DIASTOLIC BLOOD PRESSURE: 70 MMHG | HEART RATE: 70 BPM | TEMPERATURE: 97.8 F

## 2024-02-22 DIAGNOSIS — E78.2 MIXED HYPERLIPIDEMIA: ICD-10-CM

## 2024-02-22 DIAGNOSIS — Z79.899 ON VALPROATE THERAPY: ICD-10-CM

## 2024-02-22 DIAGNOSIS — I70.244 ATHEROSCLEROSIS OF NATIVE ARTERY OF LEFT LOWER EXTREMITY WITH ULCERATION OF HEEL (HCC): Chronic | ICD-10-CM

## 2024-02-22 DIAGNOSIS — L97.423 ULCER OF HEEL, LEFT, WITH NECROSIS OF MUSCLE (HCC): Primary | ICD-10-CM

## 2024-02-22 DIAGNOSIS — N18.6 TYPE 2 DIABETES MELLITUS WITH CHRONIC KIDNEY DISEASE ON CHRONIC DIALYSIS, WITHOUT LONG-TERM CURRENT USE OF INSULIN (HCC): ICD-10-CM

## 2024-02-22 DIAGNOSIS — E11.22 TYPE 2 DIABETES MELLITUS WITH CHRONIC KIDNEY DISEASE ON CHRONIC DIALYSIS, WITHOUT LONG-TERM CURRENT USE OF INSULIN (HCC): ICD-10-CM

## 2024-02-22 DIAGNOSIS — Z99.2 TYPE 2 DIABETES MELLITUS WITH CHRONIC KIDNEY DISEASE ON CHRONIC DIALYSIS, WITHOUT LONG-TERM CURRENT USE OF INSULIN (HCC): ICD-10-CM

## 2024-02-22 DIAGNOSIS — I73.9 PERIPHERAL ARTERIAL DISEASE (HCC): ICD-10-CM

## 2024-02-22 LAB
ANION GAP SERPL CALCULATED.3IONS-SCNC: 12 MMOL/L
BUN SERPL-MCNC: 31 MG/DL (ref 5–25)
CALCIUM SERPL-MCNC: 9.1 MG/DL (ref 8.4–10.2)
CHLORIDE SERPL-SCNC: 94 MMOL/L (ref 96–108)
CO2 SERPL-SCNC: 33 MMOL/L (ref 21–32)
CREAT SERPL-MCNC: 5.25 MG/DL (ref 0.6–1.3)
ERYTHROCYTE [DISTWIDTH] IN BLOOD BY AUTOMATED COUNT: 17.5 % (ref 11.6–15.1)
GFR SERPL CREATININE-BSD FRML MDRD: 10 ML/MIN/1.73SQ M
GLUCOSE P FAST SERPL-MCNC: 85 MG/DL (ref 65–99)
HCT VFR BLD AUTO: 35.8 % (ref 36.5–49.3)
HGB BLD-MCNC: 10.9 G/DL (ref 12–17)
MCH RBC QN AUTO: 30.2 PG (ref 26.8–34.3)
MCHC RBC AUTO-ENTMCNC: 30.4 G/DL (ref 31.4–37.4)
MCV RBC AUTO: 99 FL (ref 82–98)
PLATELET # BLD AUTO: 118 THOUSANDS/UL (ref 149–390)
PMV BLD AUTO: 11.3 FL (ref 8.9–12.7)
POTASSIUM SERPL-SCNC: 4.5 MMOL/L (ref 3.5–5.3)
RBC # BLD AUTO: 3.61 MILLION/UL (ref 3.88–5.62)
SODIUM SERPL-SCNC: 139 MMOL/L (ref 135–147)
WBC # BLD AUTO: 5.53 THOUSAND/UL (ref 4.31–10.16)

## 2024-02-22 PROCEDURE — 11042 DBRDMT SUBQ TIS 1ST 20SQCM/<: CPT | Performed by: PODIATRIST

## 2024-02-22 PROCEDURE — 36415 COLL VENOUS BLD VENIPUNCTURE: CPT

## 2024-02-22 PROCEDURE — 80048 BASIC METABOLIC PNL TOTAL CA: CPT

## 2024-02-22 PROCEDURE — 85027 COMPLETE CBC AUTOMATED: CPT

## 2024-02-22 RX ORDER — LIDOCAINE 40 MG/G
CREAM TOPICAL ONCE
Status: COMPLETED | OUTPATIENT
Start: 2024-02-22 | End: 2024-02-22

## 2024-02-22 RX ADMIN — LIDOCAINE 1 APPLICATION: 40 CREAM TOPICAL at 08:50

## 2024-02-22 NOTE — PATIENT INSTRUCTIONS
Orders Placed This Encounter   Procedures    Wound cleansing and dressings Diabetic Ulcer Left Heel     Wound location: Left heel  Change dressing Daily  The dressing must stay dry and intact. You may shower with dressing protected by cast cover or bag. Or you may sponge bathe. Call wound center or home health nurse if dressing becomes wet.   Apply Zinc to mona wound  Apply 1/4 strength dakins moistened gauze wet to dry dressing to wound bed  Cover with ABD  Secure with rolled gauze and tape.      This was applied today at the [facility].     Standing Status:   Future     Standing Expiration Date:   2/22/2025    Wound off loading Diabetic Ulcer Left Heel     Continue offloading the wound with heel relief shoe when going to appointments.  Continue to offload with prevalon heel medic boot when in bed, if it does not stay in place then try pillow under knee and lower leg to float the heel.     Standing Status:   Future     Standing Expiration Date:   2/22/2025    Wound home care Diabetic Ulcer Left Heel     SL VNA     Standing Status:   Future     Standing Expiration Date:   2/22/2025

## 2024-02-22 NOTE — PROGRESS NOTES
"Patient ID: Asad Galloway is a 63 y.o. male Date of Birth 1960     Chief Complaint  Chief Complaint   Patient presents with    Follow Up Wound Care Visit     Left heel wound       Allergies  Patient has no known allergies.    Assessment:    No problem-specific Assessment & Plan notes found for this encounter.       Diagnoses and all orders for this visit:    Ulcer of heel, left, with necrosis of muscle (HCC)  -     lidocaine (LMX) 4 % cream  -     Wound cleansing and dressings Diabetic Ulcer Left Heel; Future  -     Wound off loading Diabetic Ulcer Left Heel; Future  -     Wound home care Diabetic Ulcer Left Heel; Future  -     Debridement Diabetic Ulcer Left Heel    Type 2 diabetes mellitus with chronic kidney disease on chronic dialysis, without long-term current use of insulin (HCC)  -     Debridement Diabetic Ulcer Left Heel    Peripheral arterial disease (HCC)        Debridement   Wound 06/02/23 Diabetic Ulcer Heel Left    Universal Protocol:  Consent: Verbal consent obtained.  Risks and benefits: risks, benefits and alternatives were discussed  Consent given by: patient  Time out: Immediately prior to procedure a \"time out\" was called to verify the correct patient, procedure, equipment, support staff and site/side marked as required.  Timeout called at: 2/22/2024 9:14 AM.  Patient understanding: patient states understanding of the procedure being performed  Patient identity confirmed: verbally with patient    Debridement Details  Performed by: physician  Debridement type: surgical  Level of debridement: subcutaneous tissue  Pain control: lidocaine 4%      Post-debridement measurements  Length (cm): 1.3  Width (cm): 1.5  Depth (cm): 0.9  Percent debrided: 100%  Surface Area (cm^2): 1.95  Area Debrided (cm^2): 1.95  Volume (cm^3): 1.76    Tissue and other material debrided: dermis, epidermis and subcutaneous tissue  Devitalized tissue debrided: biofilm, fibrin and slough  Instrument(s) utilized: " blade  Bleeding: small  Hemostasis obtained with: pressure  Procedural pain (0-10): 0  Post-procedural pain: 0   Response to treatment: procedure was tolerated well        Plan:  Reviewed medical records.  Reviewed vascular note.  Discussed his condition and prognosis.    Awaits A-gram on Monday.  Continue Dakin solution to left heel wound bid.  Discussed with him and his son that patient compliance would absolutely be required to help with his condition.     Globoped shoe for off-loading.  Awaits knee scooter.    RA in 1 week.        Wound 06/02/23 Diabetic Ulcer Heel Left (Active)   Wound Image Images linked 02/22/24 0858   Wound Description Pink;Necrotic;Gray 02/22/24 0839   Lani-wound Assessment Intact;Maceration 02/22/24 0839   Wound Length (cm) 1.2 cm 02/22/24 0839   Wound Width (cm) 1.5 cm 02/22/24 0839   Wound Depth (cm) 0.9 cm 02/22/24 0839   Wound Surface Area (cm^2) 1.8 cm^2 02/22/24 0839   Wound Volume (cm^3) 1.62 cm^3 02/22/24 0839   Calculated Wound Volume (cm^3) 1.62 cm^3 02/22/24 0839   Drainage Amount Moderate 02/22/24 0839   Drainage Description Serosanguineous 02/22/24 0839   Non-staged Wound Description Full thickness 02/22/24 0839   Dressing Gauze (Dakin's wet to dry) 02/22/24 0839   Patient Tolerance Tolerated well 02/22/24 0839   Dressing Status Intact 02/22/24 0839       Wound 06/02/23 Diabetic Ulcer Heel Left (Active)   Date First Assessed/Time First Assessed: 06/02/23 1908   Primary Wound Type: Diabetic Ulcer  Location: Heel  Wound Location Orientation: Left  Dressing Status: Clean;Dry;Intact       [REMOVED] Wound 01/29/23 Abrasion(s) Pretibial Right (Removed)   Resolved Date/Resolved Time: 12/21/23 0834  Date First Assessed/Time First Assessed: 01/29/23 1218   Traumatic Wound Type: Abrasion(s)  Location: Pretibial  Wound Location Orientation: Right  Wound Description (Comments): Scabbed  Wound Outcome: Other...       [REMOVED] Wound 02/01/23 Wrist Anterior;Right (Removed)   Resolved Date:  12/26/23  Date First Assessed/Time First Assessed: 02/01/23 1806   Location: Wrist  Wound Location Orientation: Anterior;Right       [REMOVED] Wound 02/01/23 Skin Tear Abrasion(s) Arm Left;Posterior;Proximal (Removed)   Resolved Date: 12/26/23  Date First Assessed/Time First Assessed: 02/01/23 1930   Primary Wound Type: Skin Tear  Traumatic Wound Type: Abrasion(s)  Location: Arm  Wound Location Orientation: Left;Posterior;Proximal       [REMOVED] Wound 09/29/23 Groin Right (Removed)   Resolved Date: 12/26/23  Date First Assessed/Time First Assessed: 09/29/23 0957   Location: Groin  Wound Location Orientation: Right  Wound Description (Comments): New Puncture site noted   Incision's 1st Dressing: ADHESIVE SKIN HIGH VISCOSITY EXOFIN ...       Subjective:          HPI    The patient presents for evaluation of left heel ulcer.  He saw vascular surgery and is scheduled for a-gram.  No increased pain.  His son ordered knee scooter.      The following portions of the patient's history were reviewed and updated as appropriate: allergies, current medications, past family history, past medical history, past social history, past surgical history, and problem list.      PAST MEDICAL HISTORY:  Past Medical History:   Diagnosis Date    Cerebrovascular accident (CVA) due to thrombosis of left middle cerebral artery (HCC) 07/29/2018    Chronic kidney disease     Coronary artery disease     Diabetes mellitus (HCC)     not on meds    Dialysis patient ([facility])     M-W-F    Fistula     left upper arm for hemodialyis    GERD (gastroesophageal reflux disease)     History of coronary artery stent placement     x3    Hypercholesteremia     Hyperlipidemia     Hypertension     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    Limb alert care status     no BP/IV left arm    Neuropathy     Obesity     Osteomyelitis (HCC)     last assessed 11/4/16-per son not currently    PVC's (premature ventricular contractions)     sees  Cardio    Stroke ([facility])      "last weeof July 2018 [facility]    TIA (transient ischemic attack) 10/28/2018    Wears dentures     Wears glasses        PAST SURGICAL HISTORY:  Past Surgical History:   Procedure Laterality Date    ABDOMINAL SURGERY      CARDIAC CATHETERIZATION N/A 05/02/2022    Procedure: Cardiac Coronary Angiogram;  Surgeon: Sam Davis MD;  [facility];  Service: Cardiology    CARDIAC CATHETERIZATION N/A 05/02/2022    Procedure: Cardiac pci;  Surgeon: Sam Davis MD;  [facility];  Service: Cardiology    CARDIAC CATHETERIZATION  02/01/2023    Procedure: Cardiac catheterization;  Surgeon: Sam Davis MD;  [facility];  Service: Cardiology    CARDIAC CATHETERIZATION N/A 02/01/2023    Procedure: Cardiac pci;  Surgeon: Sam Davis MD;  [facility];  Service: Cardiology    CARDIAC CATHETERIZATION N/A 02/01/2023    Procedure: Cardiac Coronary Angiogram;  Surgeon: Sam Davis MD;  [facility];  Service: Cardiology    CARDIAC CATHETERIZATION N/A 02/01/2023    Procedure: Cardiac other-IVUS;  Surgeon: Sam Davis MD;  [facility];  Service: Cardiology    CHOLECYSTECTOMY      Percutaneous    COLONOSCOPY      CYSTOSCOPY      HEMODIALYSIS ADULT  1/22/2024    HEMODIALYSIS ADULT  1/24/2024    IR LOWER EXTREMITY ANGIOGRAM  9/29/2023    OTHER SURGICAL HISTORY      \"stimulator to control bowel movements\"    MI ESOPHAGOGASTRODUODENOSCOPY TRANSORAL DIAGNOSTIC N/A 09/27/2016    Procedure: ESOPHAGOGASTRODUODENOSCOPY (EGD);  Surgeon: Adele Rowe MD;  Location: [facility] GI LAB;  Service: Gastroenterology    MI LAPAROSCOPY SURG CHOLECYSTECTOMY N/A 02/29/2016    Procedure: LAPAROSCOPIC CHOLECYSTECTOMY ;  Surgeon: Cliff Roman DO;  Location: [facility];  Service: General    MI SLCTV CATHJ 3RD+ ORD SLCTV ABDL PEL/LXTR BRNCH Left 9/29/2023    Procedure: Left leg angiogram with intervention;  Surgeon: Michelle Galvan MD;  " "Location: [facility];  Service: Vascular    ROTATOR CUFF REPAIR Right     SPINAL CORD STIMULATOR IMPLANT      \"Medtronic interstim model # 3058- in lower back to control bowel movements-currently turned off-battery is dead\"    TOE AMPUTATION Right 10/28/2016    Procedure: 3RD TOE AMPUTATION ;  Surgeon: Anjel Salas DPM;  Location: [facility];  Service:         ALLERGIES:  Patient has no known allergies.    MEDICATIONS:  Current Outpatient Medications   Medication Sig Dispense Refill    aspirin (ECOTRIN LOW STRENGTH) 81 mg EC tablet Take 81 mg by mouth daily Resume on 8/14        atorvastatin (LIPITOR) 40 mg tablet TAKE 1 TABLET BY MOUTH DAILY WITH DINNER 90 tablet 1    Blood Glucose Monitoring Suppl (True Metrix Meter) w/Device KIT Use to test blood sugars 3 times daily 1 kit 0    Blood Pressure Monitoring (BLOOD PRESSURE CUFF) MISC Use to check blood pressure before taking blood pressure medication and 1 hour after and follow instructions provided in discharge instructions based on the readings. 1 each 0    Cholecalciferol (Vitamin D3) 1.25 MG (12551 UT) CAPS TAKE 1 CAPSULE BY MOUTH ONE TIME PER WEEK 12 capsule 3    divalproex sodium (DEPAKOTE SPRINKLE) 125 MG capsule TAKE 2 CAPSULES (250 MG TOTAL) BY MOUTH EVERY 12 (TWELVE) HOURS 360 capsule 1    glucose blood (True Metrix Blood Glucose Test) test strip Use 1 each 3 (three) times a day Use as instructed 300 strip 1    Lancets MISC Use 3 (three) times a day Use 3 times daily 300 each 0    metoprolol succinate (TOPROL-XL) 50 mg 24 hr tablet Take 1 tablet (50 mg total) by mouth 2 (two) times a day 180 tablet 3    prasugrel (EFFIENT) tablet Take 1 tablet (5 mg total) by mouth daily (Patient taking differently: Take 5 mg by mouth daily Takes with pudding) 90 tablet 3    sacubitril-valsartan (Entresto) 24-26 MG TABS Take 1 tablet by mouth in the morning Bedtime 90 tablet 3    Sevelamer Carbonate 2.4 g PACK VIGOROUSLY MIX CONTENTS OF 1 PACKET IN WATER (IT WILL " NOT DISSOLVE) AND DRINK 3 TIMES DAILY WITH MEALS      sodium hypochlorite (DAKIN'S HALF-STRENGTH) external solution Apply 1 Application topically every other day At each dressing change 473 mL 0     No current facility-administered medications for this visit.       SOCIAL HISTORY:  Social History     Socioeconomic History    Marital status: /Civil Union     Spouse name: None    Number of children: None    Years of education: None    Highest education level: Not asked   Occupational History    Occupation: disabled   Tobacco Use    Smoking status: Never    Smokeless tobacco: Never   Vaping Use    Vaping status: Never Used   Substance and Sexual Activity    Alcohol use: Never    Drug use: No    Sexual activity: Not Currently     Partners: Female     Comment: defer   Other Topics Concern    None   Social History Narrative    Daily caffeine consumption 2-3 servings a day     Social Determinants of Health     Financial Resource Strain: Patient Declined (7/13/2023)    Overall Financial Resource Strain (CARDIA)     Difficulty of Paying Living Expenses: Patient declined   Food Insecurity: No Food Insecurity (1/19/2024)    Hunger Vital Sign     Worried About Running Out of Food in the Last Year: Never true     Ran Out of Food in the Last Year: Never true   Transportation Needs: No Transportation Needs (1/19/2024)    PRAPARE - Transportation     Lack of Transportation (Medical): No     Lack of Transportation (Non-Medical): No   Physical Activity: Not on file   Stress: No Stress Concern Present (1/18/2019)    [place] [employer] - Occupational Stress Questionnaire     Feeling of Stress : Only a little   Social Connections: Unknown (1/18/2019)    Social Connection and Isolation Panel [NHANES]     Frequency of Communication with Friends and Family: Not on file     Frequency of Social Gatherings with Friends and Family: Not on file     Attends Shinto Services: Not on file     Active Member of Clubs  or Organizations: Not on file     Attends Club or Organization Meetings: Not on file     Marital Status:    Intimate Partner Violence: Not At Risk (1/18/2019)    Humiliation, Afraid, Rape, and Kick questionnaire     Fear of Current or Ex-Partner: No     Emotionally Abused: No     Physically Abused: No     Sexually Abused: No   Housing Stability: Low Risk  (1/19/2024)    Housing Stability Vital Sign     Unable to Pay for Housing in the Last Year: No     Number of Places Lived in the Last Year: 1     Unstable Housing in the Last Year: No        Review of Systems   Constitutional:  Negative for chills and fever.   Respiratory:  Negative for cough and shortness of breath.    Cardiovascular:  Negative for chest pain.   Gastrointestinal:  Negative for nausea and vomiting.   Skin:  Positive for wound.   Neurological:  Positive for numbness. Negative for weakness.         Objective:       Wound 06/02/23 Diabetic Ulcer Heel Left (Active)   Wound Image Images linked 02/22/24 0858   Wound Description Pink;Necrotic;Gray 02/22/24 0839   Lani-wound Assessment Intact;Maceration 02/22/24 0839   Wound Length (cm) 1.2 cm 02/22/24 0839   Wound Width (cm) 1.5 cm 02/22/24 0839   Wound Depth (cm) 0.9 cm 02/22/24 0839   Wound Surface Area (cm^2) 1.8 cm^2 02/22/24 0839   Wound Volume (cm^3) 1.62 cm^3 02/22/24 0839   Calculated Wound Volume (cm^3) 1.62 cm^3 02/22/24 0839   Drainage Amount Moderate 02/22/24 0839   Drainage Description Serosanguineous 02/22/24 0839   Non-staged Wound Description Full thickness 02/22/24 0839   Dressing Gauze (Dakin's wet to dry) 02/22/24 0839   Patient Tolerance Tolerated well 02/22/24 0839   Dressing Status Intact 02/22/24 0839       /70   Pulse 70   Temp 97.8 °F (36.6 °C)   Resp 12     Physical Exam  Vitals and nursing note reviewed.   Constitutional:       General: He is not in acute distress.     Appearance: He is not toxic-appearing or diaphoretic.   Cardiovascular:      Rate and Rhythm:  Normal rate and regular rhythm.      Pulses:           Dorsalis pedis pulses are 1+ on the left side.        Posterior tibial pulses are detected w/ Doppler on the left side.   Pulmonary:      Effort: Pulmonary effort is normal. No respiratory distress.   Musculoskeletal:         General: No signs of injury.      Right foot: No foot drop.      Left foot: No foot drop.   Feet:      Comments: Biphasic PTA left.  Skin:     General: Skin is warm.      Capillary Refill: Capillary refill takes less than 2 seconds.      Coloration: Skin is not cyanotic or mottled.      Findings: No abscess or erythema.      Nails: There is no clubbing.      Comments: Oliveros 2 DFU noted left heel.  Increased granular tissues.  Wound still deep closed to periosteal tissues.  No purulence or redness.  No signs of active infection. See wound assessment and photo.     Neurological:      General: No focal deficit present.      Mental Status: He is alert and oriented to person, place, and time.      Cranial Nerves: No cranial nerve deficit.      Sensory: No sensory deficit.      Motor: No weakness.      Coordination: Coordination normal.   Psychiatric:         Mood and Affect: Mood normal.         Behavior: Behavior normal.         Thought Content: Thought content normal.         Judgment: Judgment normal.           Wound Instructions:  Orders Placed This Encounter   Procedures    Wound cleansing and dressings Diabetic Ulcer Left Heel     Wound location: Left heel  Change dressing Daily  The dressing must stay dry and intact. You may shower with dressing protected by cast cover or bag. Or you may sponge bathe. Call wound center or home health nurse if dressing becomes wet.   Apply Zinc to mona wound  Apply 1/4 strength dakins moistened gauze wet to dry dressing to wound bed  Cover with ABD  Secure with rolled gauze and tape.      This was applied today at the [facility].     Standing Status:   Future     Standing Expiration Date:   2/22/2025    Wound  off loading Diabetic Ulcer Left Heel     Continue offloading the wound with heel relief shoe when going to appointments.  Continue to offload with prevalon heel medic boot when in bed, if it does not stay in place then try pillow under knee and lower leg to float the heel.     Standing Status:   Future     Standing Expiration Date:   2/22/2025    Wound home care Diabetic Ulcer Left Heel     SL VNA     Standing Status:   Future     Standing Expiration Date:   2/22/2025    Debridement Diabetic Ulcer Left Heel     This order was created via procedure documentation        Diagnosis ICD-10-CM Associated Orders   1. Ulcer of heel, left, with necrosis of muscle ([facility])  L97.423 lidocaine (LMX) 4 % cream     Wound cleansing and dressings Diabetic Ulcer Left Heel     Wound off loading Diabetic Ulcer Left Heel     Wound home care Diabetic Ulcer Left Heel     Debridement Diabetic Ulcer Left Heel      2. Type 2 diabetes mellitus with chronic kidney disease on chronic dialysis, without long-term current use of insulin ([facility])  E11.22 Debridement Diabetic Ulcer Left Heel    N18.6     Z99.2       3. Peripheral arterial disease ([facility])  I73.9

## 2024-02-24 ENCOUNTER — HOME CARE VISIT (OUTPATIENT)
Dept: HOME HEALTH SERVICES | Facility: HOME HEALTHCARE | Age: 64
End: 2024-02-24
Payer: MEDICARE

## 2024-02-24 VITALS
DIASTOLIC BLOOD PRESSURE: 64 MMHG | SYSTOLIC BLOOD PRESSURE: 110 MMHG | HEART RATE: 72 BPM | RESPIRATION RATE: 18 BRPM | OXYGEN SATURATION: 95 % | TEMPERATURE: 97.8 F

## 2024-02-24 PROCEDURE — G0300 HHS/HOSPICE OF LPN EA 15 MIN: HCPCS

## 2024-02-26 ENCOUNTER — APPOINTMENT (OUTPATIENT)
Dept: RADIOLOGY | Facility: HOSPITAL | Age: 64
End: 2024-02-26
Payer: MEDICARE

## 2024-02-26 ENCOUNTER — ANESTHESIA (OUTPATIENT)
Dept: PERIOP | Facility: HOSPITAL | Age: 64
End: 2024-02-26
Payer: MEDICARE

## 2024-02-26 ENCOUNTER — HOSPITAL ENCOUNTER (OUTPATIENT)
Facility: HOSPITAL | Age: 64
Setting detail: OUTPATIENT SURGERY
Discharge: HOME | End: 2024-02-26
Attending: SURGERY | Admitting: SURGERY
Payer: MEDICARE

## 2024-02-26 VITALS
TEMPERATURE: 96.9 F | HEIGHT: 68 IN | RESPIRATION RATE: 18 BRPM | OXYGEN SATURATION: 98 % | SYSTOLIC BLOOD PRESSURE: 118 MMHG | DIASTOLIC BLOOD PRESSURE: 63 MMHG | WEIGHT: 210 LBS | HEART RATE: 62 BPM | BODY MASS INDEX: 31.83 KG/M2

## 2024-02-26 DIAGNOSIS — I70.244 ATHEROSCLEROSIS OF NATIVE ARTERY OF LEFT LOWER EXTREMITY WITH ULCERATION OF HEEL (HCC): Primary | ICD-10-CM

## 2024-02-26 LAB
ABO GROUP BLD: NORMAL
BLD GP AB SCN SERPL QL: NEGATIVE
GLUCOSE SERPL-MCNC: 107 MG/DL (ref 65–140)
GLUCOSE SERPL-MCNC: 92 MG/DL (ref 65–140)
RH BLD: POSITIVE
SPECIMEN EXPIRATION DATE: NORMAL

## 2024-02-26 PROCEDURE — C1769 GUIDE WIRE: HCPCS | Performed by: SURGERY

## 2024-02-26 PROCEDURE — 86901 BLOOD TYPING SEROLOGIC RH(D): CPT | Performed by: SURGERY

## 2024-02-26 PROCEDURE — 86850 RBC ANTIBODY SCREEN: CPT | Performed by: SURGERY

## 2024-02-26 PROCEDURE — 37229 PR REVSC OPN/PRQ TIB/PERO W/ATHRC/ANGIOP SM VSL: CPT | Performed by: SURGERY

## 2024-02-26 PROCEDURE — C1769 GUIDE WIRE: HCPCS

## 2024-02-26 PROCEDURE — C1724 CATH, TRANS ATHEREC,ROTATION: HCPCS | Performed by: SURGERY

## 2024-02-26 PROCEDURE — C1894 INTRO/SHEATH, NON-LASER: HCPCS | Performed by: SURGERY

## 2024-02-26 PROCEDURE — 75710 ARTERY X-RAYS ARM/LEG: CPT | Performed by: SURGERY

## 2024-02-26 PROCEDURE — C2623 CATH, TRANSLUMIN, DRUG-COAT: HCPCS | Performed by: SURGERY

## 2024-02-26 PROCEDURE — C1887 CATHETER, GUIDING: HCPCS | Performed by: SURGERY

## 2024-02-26 PROCEDURE — 82948 REAGENT STRIP/BLOOD GLUCOSE: CPT

## 2024-02-26 PROCEDURE — C1760 CLOSURE DEV, VASC: HCPCS | Performed by: SURGERY

## 2024-02-26 PROCEDURE — 86900 BLOOD TYPING SEROLOGIC ABO: CPT | Performed by: SURGERY

## 2024-02-26 PROCEDURE — C1725 CATH, TRANSLUMIN NON-LASER: HCPCS | Performed by: SURGERY

## 2024-02-26 PROCEDURE — 76937 US GUIDE VASCULAR ACCESS: CPT

## 2024-02-26 PROCEDURE — 37224 PR REVSC OPN/PRG FEM/POP W/ANGIOPLASTY UNI: CPT | Performed by: SURGERY

## 2024-02-26 RX ORDER — ONDANSETRON 2 MG/ML
4 INJECTION INTRAMUSCULAR; INTRAVENOUS ONCE AS NEEDED
Status: DISCONTINUED | OUTPATIENT
Start: 2024-02-26 | End: 2024-02-26 | Stop reason: HOSPADM

## 2024-02-26 RX ORDER — DIPHENHYDRAMINE HYDROCHLORIDE 50 MG/ML
12.5 INJECTION INTRAMUSCULAR; INTRAVENOUS ONCE AS NEEDED
Status: DISCONTINUED | OUTPATIENT
Start: 2024-02-26 | End: 2024-02-26 | Stop reason: HOSPADM

## 2024-02-26 RX ORDER — HEPARIN SODIUM 200 [USP'U]/100ML
INJECTION, SOLUTION INTRAVENOUS
Status: COMPLETED | OUTPATIENT
Start: 2024-02-26 | End: 2024-02-26

## 2024-02-26 RX ORDER — MIDAZOLAM HYDROCHLORIDE 2 MG/2ML
INJECTION, SOLUTION INTRAMUSCULAR; INTRAVENOUS AS NEEDED
Status: DISCONTINUED | OUTPATIENT
Start: 2024-02-26 | End: 2024-02-26

## 2024-02-26 RX ORDER — PROPOFOL 10 MG/ML
INJECTION, EMULSION INTRAVENOUS AS NEEDED
Status: DISCONTINUED | OUTPATIENT
Start: 2024-02-26 | End: 2024-02-26

## 2024-02-26 RX ORDER — ONDANSETRON 2 MG/ML
INJECTION INTRAMUSCULAR; INTRAVENOUS AS NEEDED
Status: DISCONTINUED | OUTPATIENT
Start: 2024-02-26 | End: 2024-02-26

## 2024-02-26 RX ORDER — HEPARIN SODIUM 1000 [USP'U]/ML
INJECTION, SOLUTION INTRAVENOUS; SUBCUTANEOUS AS NEEDED
Status: DISCONTINUED | OUTPATIENT
Start: 2024-02-26 | End: 2024-02-26

## 2024-02-26 RX ORDER — PROTAMINE SULFATE 10 MG/ML
INJECTION, SOLUTION INTRAVENOUS AS NEEDED
Status: DISCONTINUED | OUTPATIENT
Start: 2024-02-26 | End: 2024-02-26

## 2024-02-26 RX ORDER — IODIXANOL 320 MG/ML
INJECTION, SOLUTION INTRAVASCULAR AS NEEDED
Status: DISCONTINUED | OUTPATIENT
Start: 2024-02-26 | End: 2024-02-26 | Stop reason: HOSPADM

## 2024-02-26 RX ORDER — FENTANYL CITRATE/PF 50 MCG/ML
25 SYRINGE (ML) INJECTION
Status: DISCONTINUED | OUTPATIENT
Start: 2024-02-26 | End: 2024-02-26 | Stop reason: HOSPADM

## 2024-02-26 RX ORDER — FENTANYL CITRATE 50 UG/ML
INJECTION, SOLUTION INTRAMUSCULAR; INTRAVENOUS AS NEEDED
Status: DISCONTINUED | OUTPATIENT
Start: 2024-02-26 | End: 2024-02-26

## 2024-02-26 RX ORDER — SODIUM CHLORIDE 9 MG/ML
INJECTION, SOLUTION INTRAVENOUS CONTINUOUS PRN
Status: DISCONTINUED | OUTPATIENT
Start: 2024-02-26 | End: 2024-02-26

## 2024-02-26 RX ORDER — ROCURONIUM BROMIDE 10 MG/ML
INJECTION, SOLUTION INTRAVENOUS AS NEEDED
Status: DISCONTINUED | OUTPATIENT
Start: 2024-02-26 | End: 2024-02-26

## 2024-02-26 RX ORDER — PROPOFOL 10 MG/ML
INJECTION, EMULSION INTRAVENOUS CONTINUOUS PRN
Status: DISCONTINUED | OUTPATIENT
Start: 2024-02-26 | End: 2024-02-26

## 2024-02-26 RX ORDER — ALBUTEROL SULFATE 2.5 MG/3ML
2.5 SOLUTION RESPIRATORY (INHALATION) ONCE AS NEEDED
Status: DISCONTINUED | OUTPATIENT
Start: 2024-02-26 | End: 2024-02-26 | Stop reason: HOSPADM

## 2024-02-26 RX ORDER — CHLORHEXIDINE GLUCONATE ORAL RINSE 1.2 MG/ML
15 SOLUTION DENTAL ONCE
Status: COMPLETED | OUTPATIENT
Start: 2024-02-26 | End: 2024-02-26

## 2024-02-26 RX ORDER — OXYCODONE HYDROCHLORIDE 5 MG/1
5 TABLET ORAL EVERY 8 HOURS PRN
Status: DISCONTINUED | OUTPATIENT
Start: 2024-02-26 | End: 2024-02-26 | Stop reason: HOSPADM

## 2024-02-26 RX ORDER — HYDROMORPHONE HCL/PF 1 MG/ML
0.5 SYRINGE (ML) INJECTION
Status: DISCONTINUED | OUTPATIENT
Start: 2024-02-26 | End: 2024-02-26 | Stop reason: HOSPADM

## 2024-02-26 RX ADMIN — SODIUM CHLORIDE: 0.9 INJECTION, SOLUTION INTRAVENOUS at 11:45

## 2024-02-26 RX ADMIN — CHLORHEXIDINE GLUCONATE 15 ML: 1.2 SOLUTION ORAL at 10:06

## 2024-02-26 RX ADMIN — SODIUM CHLORIDE: 9 INJECTION, SOLUTION INTRAVENOUS at 11:55

## 2024-02-26 RX ADMIN — PHENYLEPHRINE HYDROCHLORIDE 20 MCG/MIN: 10 INJECTION INTRAVENOUS at 12:58

## 2024-02-26 RX ADMIN — PROTAMINE SULFATE 20 MG: 10 INJECTION, SOLUTION INTRAVENOUS at 14:23

## 2024-02-26 RX ADMIN — MIDAZOLAM 1.5 MG: 1 INJECTION INTRAMUSCULAR; INTRAVENOUS at 12:03

## 2024-02-26 RX ADMIN — FENTANYL CITRATE 25 MCG: 50 INJECTION INTRAMUSCULAR; INTRAVENOUS at 14:05

## 2024-02-26 RX ADMIN — ROCURONIUM BROMIDE 50 MG: 10 INJECTION, SOLUTION INTRAVENOUS at 12:23

## 2024-02-26 RX ADMIN — PROPOFOL 20 MG: 10 INJECTION, EMULSION INTRAVENOUS at 12:08

## 2024-02-26 RX ADMIN — SODIUM CHLORIDE 0.3 MCG/KG/HR: 900 INJECTION INTRAVENOUS at 12:03

## 2024-02-26 RX ADMIN — FENTANYL CITRATE 25 MCG: 50 INJECTION INTRAMUSCULAR; INTRAVENOUS at 12:04

## 2024-02-26 RX ADMIN — PHENYLEPHRINE HYDROCHLORIDE 40 MCG/MIN: 10 INJECTION INTRAVENOUS at 12:23

## 2024-02-26 RX ADMIN — ONDANSETRON 4 MG: 2 INJECTION INTRAMUSCULAR; INTRAVENOUS at 14:09

## 2024-02-26 RX ADMIN — SUGAMMADEX 200 MG: 100 INJECTION, SOLUTION INTRAVENOUS at 14:15

## 2024-02-26 RX ADMIN — FENTANYL CITRATE 50 MCG: 50 INJECTION INTRAMUSCULAR; INTRAVENOUS at 12:56

## 2024-02-26 RX ADMIN — HEPARIN SODIUM 6000 UNITS: 1000 INJECTION INTRAVENOUS; SUBCUTANEOUS at 12:35

## 2024-02-26 RX ADMIN — PROPOFOL 40 MCG/KG/MIN: 10 INJECTION, EMULSION INTRAVENOUS at 12:20

## 2024-02-26 RX ADMIN — MIDAZOLAM 0.5 MG: 1 INJECTION INTRAMUSCULAR; INTRAVENOUS at 11:47

## 2024-02-26 NOTE — ANESTHESIA POSTPROCEDURE EVALUATION
Post-Op Assessment Note    CV Status:  Stable  Pain Score: 0    Pain management: adequate       Mental Status:  Sleepy   Hydration Status:  Euvolemic   PONV Controlled:  Controlled   Airway Patency:  Patent     Post Op Vitals Reviewed: Yes    No anethesia notable event occurred.    Staff: CRNA               BP   118/58   Temp 97.9   Pulse 61   Resp 22   SpO2 100% 4 l mask

## 2024-02-26 NOTE — OP NOTE
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 Ultrasound-guided antegrade Left Superficial Femoral Artery Access  2. LLE Arteriogram   3. Left third order arterial branch selective catheterization (post tibial)  4. Left Posterior Tibial 1.5 x 149mm [place] Atherectomy   5. Left P2 popliteal artery 5 x 20mm POBA and 5mm x 40mm [place] DCB   7. Closure of Left SFA access site with Mynx closure device  8. 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 Discussed risk and benefits of intervention.      Operative Findings Including Radiographic Interpretation of Imaging :  Superficial femoral artery- Patent without significant stenosis   Popliteal artery- Patent evidence of 60% stenosis to P2 segment, treated w/ 5mm DCB and 5mm POBA with <40% non flow limiting residual stenosis   Anterior tibial artery- Patent with non flow limiting eccentric disease noted. Patent with runoff to the DP artery with intact tarsal arcade and pedal arch  Tibioperoneal trunk- Patent without significant stenosis   Posterior tibial artery- Patent without  significant stenosis proximally. Distally with >90% stenosis mid-distal tibia with occlusion just proximal to medial malleolus and reconstitution in plantar artery; unable to cross with seeker catheter, attempts at Phoenix atherectomy unsuccessful   Peroneal artery- Patent without significant stenosis with diminutive flow to foot  Plantar arch- Patent without significant stenosis     Complications:   None    Procedure and Technique:  Consent was obtained preoperatively.  Patient was brought to the operating room placed on table in supine position.  All bony prominences appropriate padded.  Patient had induction of general anesthesia and was endotracheally intubated.  Patient received appropriate perioperative antibiotics.  Bilateral groins were then prepped and draped in usual sterile fashion.  A timeout was performed and all were in agreement.    The left groin was assessed via ultrasonography and a micropuncture kit was used to cannulate the superficial femoral artery in antegrade fashion.  #11 blade was used to create a skin incision.  Fluoroscopy was used to confirm wire trajectory.  A microsheath was used to introduce a 035 Bentson wire.  The microsheath was removed and replaced with a 5 Malay sheath.  A left lower extremity arteriogram demonstrated P2 stenosis and distal PT occlusion as described above.    The decision was made to treat these occlusions.  The patient was fully heprinized.  The sheath was exchanged for a 5Fr x 45cm sheath which was parked within the distal SFA.  An 035 glide advantage was used to select the PT.  This was exchanged for an 014 system.  A 014 was used to transverse the PT occlusion and was parked within a pedal branch.  Seeker catheter was used in attempt to cross lesion however unsuccessful crossing of PT stenosis.  Over the 014 system the 1.5 mm [place] atherectomy device was passed into the mid PT.  This area was atherectomized with an improvement luminal diameter.   Unfortunately atherectomy device was unable to be passed distally beyond the area of occlusion.  Given that patient had intact pedal arch and flow from AT circulation decision was then made to forego further intervention at risk of vascular injury.  Decision was then made to treat the proximal P2 lesion.  A 5mm x 40mm [place] DCB angioplasty balloon positioned with stenosis in the center.  The balloon was inflated under fluoroscopy to nominal alvaro for 3 minutes.  The sheath was utilized to perform a post treatment arteriogram which demonstrated improvement in the stenosis.  There was a small amount of residual stenosis and as such a plain balloon 5 mm x 20 mm was used for focal angioplasty with significant improvement noted on completion angiography.  Patient had intact SFA, TP trunk, anterior tibial artery flow to the foot with intact pedal arch with anterior tibial being dominant flow.  Kidneys findings decision was made to then conclude procedure.  The wire and catheter system was removed under fluoroscopy after the sheath was downsized to a 5fr short sheath.  20mg protamine was infused.  A 5Mynx device was utilized for closure of the arteriotomy.  The closure device was used successfully.  After 2 minutes of post closure digital pressure no hematoma was appreciated.  At the conclusion of the procedure all sponge instrument counts were correct.    The patient tolerated the procedure well.  A total of 52cc of contrast was used.   The patient was transported to the PACU in good condition.     Dr. Galvan was present for the entire procedure.    Vascular Quality Initiative - Peripheral Vascular Intervention     Urgency: Elective    Functional Status:  Restricted in physically strenuous activity but ambulatory and able to carry out work of a light or sedentary nature.   Ambulation: Amb = independently ambulatory    Leg Symptoms    Right: Asymptomatic:  documented peripheral arterial disease without symptoms of  claudication or ischemic pain      Treatment of Native Artery to Maintain Bypass Patency?:  No  Left: Ulcer/necrosis (gangrene): de alla tissue loss due to peripheral arterial disease, not due to non-healing prior amputation       Tissue Loss Severity: Grade 2, Deep = deeper full thickness ulcer or necrosis (gangrene) on distal leg or foot with exposed bone, joint, or tendon, or shallow heel ulcer without involvement of the calcaneus (ie, major tissue loss: salvageable with 3 digital amputations or standard transmetatarsal amputation [TMA] plus skin coverage).     Infection: Grade 1, Mild = Infection is present and at least two of the following are present: local swelling or induration, erythema >0.5 to 2 cm around ulcer, local tenderness or pain, local warmth, or purulent discharge. Other causes of an inflammatory response of the skin have been excluded (eg, gout, fracture).    COVID Information  COVID Symptoms Pre-Procedure: Asymptomatic    Treatment Delayed by Pandemic: None    Access   Number of Sites: 1     Access Site 1:     Side 1: Left    Site 1: Femoral Antegrade    Access Guidance 1:U/S    Largest Sheath Size 1: 5 Fr.    Closure Device 1: MynxGrip      Number of Closure Devices: 1     Closure Device Outcome: Closure device successful         Procedure  Fluoro Time: 26.8 minutes  Contrast Volume: Visipaque 52 ml  DAP: 11.68 Gy.cm2  CO2: no  Anticoagulant: Heparin  Protamine: Yes  If Creatinine is > 1.2 or missing, SHABANA Prophylaxis none     Treatment Details  Indication: Occlusive Disease,    Completion Assessment  Artery 1 treated: Popliteal   Left               Outflow: AT,PT,Peroneal: 2             Segments treated: P2                       Was this Site previously treated?: No          TASC Grade: A          Total Treated Length: 40 cm          Total Occluded Length: 20 cm          Calcification: Severe (calcification on both sides of artery > half length of lesion)          Number of Treatment types  (Devices):   2           Device 1          Treatment Type: Plain Balloon         Device 2          Treatment Type: Special Balloon,  Drug Coated Balloon                Diameter: 5 mm          Length: 40 mm          Concomitant: None          Technical result: Successful (stenosis <=30%)      Artery 2 treated: Post Tibial  Left                     Was this Site previously treated?: Yes, PTA          TASC Grade: B          Total Treated Length: 40 cm           Total Occluded Length: 40 cm          Calcification: Severe (calcification on both sides of artery > half length of lesion)          Number of Treatment types (Devices):   1           Device 1          Treatment Type: Atherectomy                Type:  [place]          Length: 145 mm          Concomitant: None          Technical result: Target Lesion Occlusion  None     None     Post Procedure  Patient currently taking: Statin, Yes      Antiplatelet Medication, Yes    Procedure Complications: No    SIGNATURE: Jorge Sims DO  DATE: February 26, 2024  TIME: 2:34 PM

## 2024-02-26 NOTE — ANESTHESIA PREPROCEDURE EVALUATION
Procedure:  Left leg angiogram antegrade access, left posterior tibial atherectomy and angioplasty (Left: Abdomen)    Relevant Problems   CARDIO   (+) CAD, multiple vessel   (+) Chest pain   (+) Mixed hyperlipidemia   (+) Moderate AS (aortic stenosis)   (+) Primary hypertension   (+) Type 1 non-ST elevation myocardial infarction (NSTEMI) s/p ADE to in-stent restenosis of mid LAD      ENDO   (+) Type 2 diabetes mellitus with chronic kidney disease on chronic dialysis, without long-term current use of insulin (HCC)      GI/HEPATIC   (+) GERD (gastroesophageal reflux disease)   (+) Rectal bleeding      /RENAL   (+) ESRD on dialysis (HCC)      HEMATOLOGY   (+) Anemia      NEURO/PSYCH   (+) Anxiety associated with depression   (+) Diabetic polyneuropathy associated with type 2 diabetes mellitus (HCC)      PULMONARY   (+) SOB (shortness of breath)      ECHO 12/21/2023:     Left Ventricle: Left ventricular cavity size is normal. Wall thickness is mildly increased. The left ventricular ejection fraction is 40%. Systolic function is moderately reduced. Wall motion is normal. Diastolic function is mildly abnormal, consistent with grade I (abnormal) relaxation.    The following segments are akinetic: apex.    The following segments are hypokinetic: mid inferior, mid inferolateral, apical anterior, apical inferior and apical lateral.    All other segments are normal.    Right Ventricle: Right ventricular cavity size is dilated. Systolic function is mildly reduced.    Left Atrium: The atrium is mildly dilated.    Aortic Valve: There is mild to moderate regurgitation. There is moderate to severe stenosis by visual assessment and calcification severity. I suspect measurements underestimate the severity.. The aortic valve peak velocity is 2.58 m/s. The aortic valve mean gradient is 15 mmHg. The dimensionless velocity index is 0.39. The aortic valve area is 1.99 cm2.    Mitral Valve: There is moderate regurgitation.    Tricuspid  Valve: There is mild regurgitation. The right ventricular systolic pressure is moderately elevated. The estimated right ventricular systolic pressure is 56.00 mmHg.   Physical Exam    Airway    Mallampati score: III  TM Distance: >3 FB  Neck ROM: full     Dental        Cardiovascular      Pulmonary      Other Findings        Anesthesia Plan  ASA Score- 4     Anesthesia Type- general with ASA Monitors.         Additional Monitors: arterial line.    Airway Plan: ETT.           Plan Factors-Exercise tolerance (METS): <4 METS.    Chart reviewed. EKG reviewed. Imaging results reviewed. Existing labs reviewed. Patient summary reviewed.                  Induction- intravenous.    Postoperative Plan- Plan for postoperative opioid use. Planned trial extubation    Informed Consent- Anesthetic plan and risks discussed with patient.  I personally reviewed this patient with the CRNA. Discussed and agreed on the Anesthesia Plan with the CRNA..

## 2024-02-26 NOTE — DISCHARGE INSTR - AVS FIRST PAGE
DISCHARGE INSTRUCTIONS  ARTERIOGRAM/ANGIOPLASTY/STENT    ACTIVITY: On the evening following the procedure, you should be mostly resting.  Someone should remain with you during the evening and overnight following the procedure.     On the day after your procedure, limit your activity to walking.  Avoid heavy lifting (no more than 15 lbs) for the first three days. Walking up steps and normal activities may be resumed as you feel ready.   You should not drive a car for at least two days following discharge from the hospital. You may ride in a car.   If you have any questions regarding a particular activity, please discuss with your doctor or nurse before you are discharged.    DIET:  Resume your normal diet.  Drink more water than usual for the next 24 hours.    PROCEDURE SITE: You may have a procedure site in your groin, arm, or foot.  You may have surgical glue at your procedure site.  The glue is used to cover the procedure site, assist in closure, and prevent contamination. This adhesive will darken and peel away on its own within one to two weeks. Do not pick at it.    You should shower daily.  Wash incision daily with soap and water, but do not rub or scrub the incision; rinse thoroughly and pat dry.  Do not bathe in a tub or swim for the first 2 week following your procedure or if you have any open wounds.  It is normal to have some bruising, swelling or discoloration around the procedure site.  IF increasing redness, pain, or a bulge develops, call our office immediately.    If present, you may remove the band-aid or “steri-strips” over your procedure site after two days.   If you notice any active bleeding at the site, apply pressure to the site and call our office ([phone]) or [phone].    FOLLOW UP STUDIES:  Your doctor will discuss whether further treatments or follow-up studies are necessary at your first post procedure visit.    FOLLOW UP APPOINTMENTS:  Making and keeping follow up appointments and  ultrasound tests are important to your recovery.  If you have difficulty making it to or keeping your follow up appointments, call the office.    Please continue your Aspirin and Effient as previously directed starting tomorrow morning.      If you have increased pain, fever >101.5, increased drainage, redness or a bad smell at your surgery site, new coldness/numbness of your arm or leg, please call us immediately and GO directly to the ER.    PLEASE CALL THE OFFICE IF YOU HAVE ANY QUESTIONS  [phone]  [phone] [address], [address], [place] [address]  [address] [facility], [address], [place] [address]  [address] [place] [address]  [address] [facility] [address], [place] [address]  [address] [facility], [place] [address]  [address] [facility], 2nd Floor, [address], [place] [address]  [address] [facility], [address], [place] [address]  [address] [facility], 1st Floor, [address] [address]  [address], [facility] [address], [place] [address]  [address] [address]

## 2024-02-28 ENCOUNTER — HOME CARE VISIT (OUTPATIENT)
Dept: HOME HEALTH SERVICES | Facility: HOME HEALTHCARE | Age: 64
End: 2024-02-28
Payer: MEDICARE

## 2024-02-28 VITALS
OXYGEN SATURATION: 98 % | HEART RATE: 62 BPM | SYSTOLIC BLOOD PRESSURE: 106 MMHG | TEMPERATURE: 97 F | DIASTOLIC BLOOD PRESSURE: 58 MMHG | RESPIRATION RATE: 16 BRPM

## 2024-02-28 PROCEDURE — G0299 HHS/HOSPICE OF RN EA 15 MIN: HCPCS

## 2024-02-29 ENCOUNTER — OFFICE VISIT (OUTPATIENT)
Dept: WOUND CARE | Facility: HOSPITAL | Age: 64
End: 2024-02-29
Payer: MEDICARE

## 2024-02-29 ENCOUNTER — HOME CARE VISIT (OUTPATIENT)
Dept: HOME HEALTH SERVICES | Facility: HOME HEALTHCARE | Age: 64
End: 2024-02-29
Payer: MEDICARE

## 2024-02-29 VITALS — SYSTOLIC BLOOD PRESSURE: 132 MMHG | TEMPERATURE: 97.5 F | HEART RATE: 67 BPM | DIASTOLIC BLOOD PRESSURE: 82 MMHG

## 2024-02-29 DIAGNOSIS — L97.423 ULCER OF HEEL, LEFT, WITH NECROSIS OF MUSCLE (HCC): Primary | ICD-10-CM

## 2024-02-29 DIAGNOSIS — E11.22 TYPE 2 DIABETES MELLITUS WITH CHRONIC KIDNEY DISEASE ON CHRONIC DIALYSIS, WITHOUT LONG-TERM CURRENT USE OF INSULIN (HCC): ICD-10-CM

## 2024-02-29 DIAGNOSIS — N18.6 TYPE 2 DIABETES MELLITUS WITH CHRONIC KIDNEY DISEASE ON CHRONIC DIALYSIS, WITHOUT LONG-TERM CURRENT USE OF INSULIN (HCC): ICD-10-CM

## 2024-02-29 DIAGNOSIS — I73.9 PERIPHERAL ARTERIAL DISEASE (HCC): ICD-10-CM

## 2024-02-29 DIAGNOSIS — Z99.2 TYPE 2 DIABETES MELLITUS WITH CHRONIC KIDNEY DISEASE ON CHRONIC DIALYSIS, WITHOUT LONG-TERM CURRENT USE OF INSULIN (HCC): ICD-10-CM

## 2024-02-29 PROCEDURE — 11042 DBRDMT SUBQ TIS 1ST 20SQCM/<: CPT | Performed by: PODIATRIST

## 2024-02-29 RX ORDER — LIDOCAINE 40 MG/G
CREAM TOPICAL ONCE
Status: COMPLETED | OUTPATIENT
Start: 2024-02-29 | End: 2024-02-29

## 2024-02-29 RX ADMIN — LIDOCAINE: 40 CREAM TOPICAL at 08:42

## 2024-02-29 NOTE — PROGRESS NOTES
"Patient ID: Asad Galloway is a 63 y.o. male Date of Birth 1960     Chief Complaint  Chief Complaint   Patient presents with    Follow Up Wound Care Visit     L heel wound. Son reports patient had his L leg angioplasty on Monday 2/26.       Allergies  Patient has no known allergies.    Assessment:    No problem-specific Assessment & Plan notes found for this encounter.       Diagnoses and all orders for this visit:    Ulcer of heel, left, with necrosis of muscle (HCC)  -     lidocaine (LMX) 4 % cream  -     Wound cleansing and dressings Diabetic Ulcer Left Heel; Future  -     Debridement Diabetic Ulcer Left Heel    Type 2 diabetes mellitus with chronic kidney disease on chronic dialysis, without long-term current use of insulin (HCC)  -     lidocaine (LMX) 4 % cream  -     Wound cleansing and dressings Diabetic Ulcer Left Heel; Future  -     Debridement Diabetic Ulcer Left Heel    Peripheral arterial disease (HCC)  -     lidocaine (LMX) 4 % cream  -     Wound cleansing and dressings Diabetic Ulcer Left Heel; Future        Debridement   Wound 06/02/23 Diabetic Ulcer Heel Left    Universal Protocol:  Consent: Verbal consent obtained.  Risks and benefits: risks, benefits and alternatives were discussed  Consent given by: patient  Time out: Immediately prior to procedure a \"time out\" was called to verify the correct patient, procedure, equipment, support staff and site/side marked as required.  Timeout called at: 2/29/2024 8:48 AM.  Patient understanding: patient states understanding of the procedure being performed  Patient identity confirmed: verbally with patient    Debridement Details  Performed by: physician  Debridement type: surgical  Level of debridement: subcutaneous tissue  Pain control: lidocaine 4%      Post-debridement measurements  Length (cm): 1.5  Width (cm): 1.4  Depth (cm): 0.9  Percent debrided: 100%  Surface Area (cm^2): 2.1  Area Debrided (cm^2): 2.1  Volume (cm^3): 1.89    Tissue and other " material debrided: dermis, epidermis and subcutaneous tissue  Devitalized tissue debrided: biofilm, fibrin and slough  Instrument(s) utilized: blade  Bleeding: small  Hemostasis obtained with: pressure  Procedural pain (0-10): 0  Post-procedural pain: 0   Response to treatment: procedure was tolerated well        Plan:  Reviewed medical records.  Reviewed vascular note.  Reviewed A-gram.  Still has PTA occlusion.  Discussed his condition and prognosis.  Pt to follow-up with vascular surgery next week.  Continue Dakin solution to left heel wound bid.  Discussed with him and his son that patient compliance would absolutely be required to help with his condition.     Globoped shoe for off-loading.  Recommend knee scooter.  He has been wearing the wrong boot at night.  Sent him for Prevalon boot.    RA in 1 week.        Wound 06/02/23 Diabetic Ulcer Heel Left (Active)   Enter Oliveros score: Oliveros Grade 2: Deep ulcer extended to ligament, tendon, joint capsule, bone, or deep fascia without abscess or osteomyelitis (OM) 02/29/24 0820   Wound Image Images linked 02/29/24 0837   Wound Description Yellow;Slough;Pink 02/29/24 0820   Lani-wound Assessment Dry;Intact;Purple 02/29/24 0820   Wound Length (cm) 1.4 cm 02/29/24 0820   Wound Width (cm) 1.4 cm 02/29/24 0820   Wound Depth (cm) 0.9 cm 02/29/24 0820   Wound Surface Area (cm^2) 1.96 cm^2 02/29/24 0820   Wound Volume (cm^3) 1.764 cm^3 02/29/24 0820   Calculated Wound Volume (cm^3) 1.76 cm^3 02/29/24 0820   Drainage Amount Moderate 02/29/24 0820   Drainage Description Serosanguineous 02/29/24 0820       Wound 06/02/23 Diabetic Ulcer Heel Left (Active)   Date First Assessed/Time First Assessed: 06/02/23 1908   Primary Wound Type: Diabetic Ulcer  Location: Heel  Wound Location Orientation: Left  Dressing Status: Clean;Dry;Intact       Wound 02/26/24 Groin Left (Active)   Date First Assessed/Time First Assessed: 02/26/24 1410   Location: Groin  Wound Location Orientation: Left   Wound Description (Comments): LEFT GROIN ACCESS SITE, SOFT, DRY, NO HEMATOMA  Incision's 1st Dressing: ADHESIVE SKIN HIGH VISCOSITY EXOFIN 1ML ...       [REMOVED] Wound 01/29/23 Abrasion(s) Pretibial Right (Removed)   Resolved Date/Resolved Time: 12/21/23 0834  Date First Assessed/Time First Assessed: 01/29/23 1218   Traumatic Wound Type: Abrasion(s)  Location: Pretibial  Wound Location Orientation: Right  Wound Description (Comments): Scabbed  Wound Outcome: Other...       [REMOVED] Wound 02/01/23 Wrist Anterior;Right (Removed)   Resolved Date: 12/26/23  Date First Assessed/Time First Assessed: 02/01/23 1806   Location: Wrist  Wound Location Orientation: Anterior;Right       [REMOVED] Wound 02/01/23 Skin Tear Abrasion(s) Arm Left;Posterior;Proximal (Removed)   Resolved Date: 12/26/23  Date First Assessed/Time First Assessed: 02/01/23 1930   Primary Wound Type: Skin Tear  Traumatic Wound Type: Abrasion(s)  Location: Arm  Wound Location Orientation: Left;Posterior;Proximal       [REMOVED] Wound 09/29/23 Groin Right (Removed)   Resolved Date: 12/26/23  Date First Assessed/Time First Assessed: 09/29/23 0957   Location: Groin  Wound Location Orientation: Right  Wound Description (Comments): New Puncture site noted   Incision's 1st Dressing: ADHESIVE SKIN HIGH VISCOSITY EXOFIN ...       Subjective:          HPI    The patient presents for evaluation of left heel ulcer.  S/P a-gram on Monday.  No increased pain.  No new complaint.  His son was showing the night boot, but it was CAM boot.      The following portions of the patient's history were reviewed and updated as appropriate: allergies, current medications, past family history, past medical history, past social history, past surgical history, and problem list.      PAST MEDICAL HISTORY:  Past Medical History:   Diagnosis Date    Cerebrovascular accident (CVA) due to thrombosis of left middle cerebral artery (HCC) 07/29/2018    Chronic kidney disease     Coronary  artery disease     Diabetes mellitus (HCC)     not on meds    Dialysis patient (HCC)     M-W-F    Fistula     left upper arm for hemodialyis    GERD (gastroesophageal reflux disease)     History of coronary artery stent placement     x3    Hypercholesteremia     Hyperlipidemia     Hypertension     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    Limb alert care status     no BP/IV left arm    Neuropathy     Obesity     Osteomyelitis (HCC)     last assessed 11/4/16-per son not currently    PVC's (premature ventricular contractions)     sees [facility]    Stroke (HCC)     [facility] we[facility]of July 2018 [facility]    TIA (transient ischemic attack) 10/28/2018    Wears dentures     Wears glasses        PAST SURGICAL HISTORY:  Past Surgical History:   Procedure Laterality Date    ABDOMINAL SURGERY      CARDIAC CATHETERIZATION N/A 05/02/2022    Procedure: Cardiac Coronary Angiogram;  Surgeon: Sam Davis MD;  [facility];  Service: Cardiology    CARDIAC CATHETERIZATION N/A 05/02/2022    Procedure: Cardiac pci;  Surgeon: Sam Davis MD;  [facility];  Service: Cardiology    CARDIAC CATHETERIZATION  02/01/2023    Procedure: Cardiac catheterization;  Surgeon: Sam Davis MD;  [facility];  Service: Cardiology    CARDIAC CATHETERIZATION N/A 02/01/2023    Procedure: Cardiac pci;  Surgeon: Sam Davis MD;  [facility];  Service: Cardiology    CARDIAC CATHETERIZATION N/A 02/01/2023    Procedure: Cardiac Coronary Angiogram;  Surgeon: Sam Davis MD;  [facility];  Service: Cardiology    CARDIAC CATHETERIZATION N/A 02/01/2023    Procedure: Cardiac other-IVUS;  Surgeon: Sam Davis MD;  [facility];  Service: Cardiology    CHOLECYSTECTOMY      Percutaneous    COLONOSCOPY      CYSTOSCOPY      HEMODIALYSIS ADULT  1/22/2024    HEMODIALYSIS ADULT  1/24/2024    IR LOWER EXTREMITY ANGIOGRAM  9/29/2023    IR LOWER EXTREMITY  "ANGIOGRAM  2/26/2024    OTHER SURGICAL HISTORY      \"stimulator to control bowel movements\"    WV ESOPHAGOGASTRODUODENOSCOPY TRANSORAL DIAGNOSTIC N/A 09/27/2016    Procedure: ESOPHAGOGASTRODUODENOSCOPY (EGD);  Surgeon: Adele Rowe MD;  Location: [facility];  Service: Gastroenterology    WV LAPAROSCOPY SURG CHOLECYSTECTOMY N/A 02/29/2016    Procedure: LAPAROSCOPIC CHOLECYSTECTOMY ;  Surgeon: Cliff Roman DO;  Location[facility];  Service: General    WV SLCTV CATHJ 3RD+ ORD SLCTV ABDL PEL/LXTR BRNCH Left 9/29/2023    Procedure: Left leg angiogram with intervention;  Surgeon: Michelle Galvan MD;  [facility];  Service: Vascular    WV SLCTV CATHJ 3RD+ ORD SLCTV ABDL PEL/LXTR BRNCH Left 2/26/2024    Procedure: Left leg angiogram antegrade access, left popliteal angioplasty;  Surgeon: Michelle Galvan MD;  [facility];  Service: Vascular    ROTATOR CUFF REPAIR Right     SPINAL CORD STIMULATOR IMPLANT      \"Medtronic interstim model # 3058- in lower back to control bowel movements-currently turned off-battery is dead\"    TOE AMPUTATION Right 10/28/2016    Procedure: 3RD TOE AMPUTATION ;  Surgeon: Anjel Salas DPM;  [facility];  Service:         ALLERGIES:  Patient has no known allergies.    MEDICATIONS:  Current Outpatient Medications   Medication Sig Dispense Refill    aspirin (ECOTRIN LOW STRENGTH) 81 mg EC tablet Take 81 mg by mouth daily Resume on 8/14        atorvastatin (LIPITOR) 40 mg tablet TAKE 1 TABLET BY MOUTH DAILY WITH DINNER 90 tablet 1    Blood Glucose Monitoring Suppl (True Metrix Meter) w/Device KIT Use to test blood sugars 3 times daily 1 kit 0    Blood Pressure Monitoring (BLOOD PRESSURE CUFF) MISC Use to check blood pressure before taking blood pressure medication and 1 hour after and follow instructions provided in discharge instructions based on the readings. 1 each 0    Cholecalciferol (Vitamin D3) 1.25 MG (65665 UT) CAPS TAKE 1 CAPSULE BY MOUTH ONE " TIME PER WEEK 12 capsule 3    divalproex sodium (DEPAKOTE SPRINKLE) 125 MG capsule TAKE 2 CAPSULES (250 MG TOTAL) BY MOUTH EVERY 12 (TWELVE) HOURS 360 capsule 1    glucose blood (True Metrix Blood Glucose Test) test strip Use 1 each 3 (three) times a day Use as instructed 300 strip 1    Lancets MISC Use 3 (three) times a day Use 3 times daily 300 each 0    metoprolol succinate (TOPROL-XL) 50 mg 24 hr tablet Take 1 tablet (50 mg total) by mouth 2 (two) times a day 180 tablet 3    prasugrel (EFFIENT) tablet Take 1 tablet (5 mg total) by mouth daily (Patient taking differently: Take 5 mg by mouth daily Takes with pudding) 90 tablet 3    sacubitril-valsartan (Entresto) 24-26 MG TABS Take 1 tablet by mouth in the morning Bedtime 90 tablet 3    Sevelamer Carbonate 2.4 g PACK VIGOROUSLY MIX CONTENTS OF 1 PACKET IN WATER (IT WILL NOT DISSOLVE) AND DRINK 3 TIMES DAILY WITH MEALS      sodium hypochlorite (DAKIN'S HALF-STRENGTH) external solution Apply 1 Application topically every other day At each dressing change 473 mL 0     No current facility-administered medications for this visit.       SOCIAL HISTORY:  Social History     Socioeconomic History    Marital status: /Civil Union     Spouse name: None    Number of children: None    Years of education: None    Highest education level: Not asked   Occupational History    Occupation: disabled   Tobacco Use    Smoking status: Never    Smokeless tobacco: Never   Vaping Use    Vaping status: Never Used   Substance and Sexual Activity    Alcohol use: Never    Drug use: No    Sexual activity: Not Currently     Partners: Female     Comment: defer   Other Topics Concern    None   Social History Narrative    Daily caffeine consumption 2-3 servings a day     Social Determinants of Health     Financial Resource Strain: Patient Declined (7/13/2023)    Overall Financial Resource Strain (CARDIA)     Difficulty of Paying Living Expenses: Patient declined   Food Insecurity: No Food  Insecurity (1/19/2024)    Hunger Vital Sign     Worried About Running Out of Food in the Last Year: Never true     Ran Out of Food in the Last Year: Never true   Transportation Needs: No Transportation Needs (1/19/2024)    PRAPARE - Transportation     Lack of Transportation (Medical): No     Lack of Transportation (Non-Medical): No   Physical Activity: Not on file   Stress: No Stress Concern Present (1/18/2019)    [employer] [place] [employer] - Occupational Stress Questionnaire     Feeling of Stress : Only a little   Social Connections: Unknown (1/18/2019)    Social Connection and Isolation Panel [NHANES]     Frequency of Communication with Friends and Family: Not on file     Frequency of Social Gatherings with Friends and Family: Not on file     Attends Voodoo Services: Not on file     Active Member of Clubs or Organizations: Not on file     Attends Club or Organization Meetings: Not on file     Marital Status:    Intimate Partner Violence: Not At Risk (1/18/2019)    Humiliation, Afraid, Rape, and Kick questionnaire     Fear of Current or Ex-Partner: No     Emotionally Abused: No     Physically Abused: No     Sexually Abused: No   Housing Stability: Low Risk  (1/19/2024)    Housing Stability Vital Sign     Unable to Pay for Housing in the Last Year: No     Number of Places Lived in the Last Year: 1     Unstable Housing in the Last Year: No        Review of Systems   Constitutional:  Negative for chills and fever.   Respiratory:  Negative for cough and shortness of breath.    Cardiovascular:  Negative for chest pain.   Gastrointestinal:  Negative for nausea and vomiting.   Skin:  Positive for wound.   Neurological:  Positive for numbness. Negative for weakness.         Objective:       Wound 06/02/23 Diabetic Ulcer Heel Left (Active)   Enter Oliveros score: Oliveros Grade 2: Deep ulcer extended to ligament, tendon, joint capsule, bone, or deep fascia without abscess or osteomyelitis (OM) 02/29/24  0820   Wound Image Images linked 02/29/24 0837   Wound Description Yellow;Slough;Pink 02/29/24 0820   Lani-wound Assessment Dry;Intact;Purple 02/29/24 0820   Wound Length (cm) 1.4 cm 02/29/24 0820   Wound Width (cm) 1.4 cm 02/29/24 0820   Wound Depth (cm) 0.9 cm 02/29/24 0820   Wound Surface Area (cm^2) 1.96 cm^2 02/29/24 0820   Wound Volume (cm^3) 1.764 cm^3 02/29/24 0820   Calculated Wound Volume (cm^3) 1.76 cm^3 02/29/24 0820   Drainage Amount Moderate 02/29/24 0820   Drainage Description Serosanguineous 02/29/24 0820       /82   Pulse 67   Temp 97.5 °F (36.4 °C)     Physical Exam  Vitals and nursing note reviewed.   Constitutional:       General: He is not in acute distress.     Appearance: He is not toxic-appearing or diaphoretic.   Cardiovascular:      Rate and Rhythm: Normal rate and regular rhythm.      Pulses:           Dorsalis pedis pulses are 1+ on the left side.        Posterior tibial pulses are detected w/ Doppler on the left side.   Pulmonary:      Effort: Pulmonary effort is normal. No respiratory distress.   Musculoskeletal:         General: No signs of injury.      Right foot: No foot drop.      Left foot: No foot drop.   Feet:      Comments: Biphasic PTA left.  Skin:     General: Skin is warm.      Capillary Refill: Capillary refill takes less than 2 seconds.      Coloration: Skin is not cyanotic or mottled.      Findings: No abscess or erythema.      Nails: There is no clubbing.      Comments: Oliveros 2 DFU noted left heel.  Significant reduction of bioburden.  Increased granular tissues.  No direct probe to bone.  No purulence or redness.  No signs of active infection. See wound assessment and photo.     Neurological:      General: No focal deficit present.      Mental Status: He is alert and oriented to person, place, and time.      Cranial Nerves: No cranial nerve deficit.      Sensory: No sensory deficit.      Motor: No weakness.      Coordination: Coordination normal.   Psychiatric:          Mood and Affect: Mood normal.         Behavior: Behavior normal.         Thought Content: Thought content normal.         Judgment: Judgment normal.           Wound Instructions:  Orders Placed This Encounter   Procedures    Wound cleansing and dressings Diabetic Ulcer Left Heel     Wound cleansing and dressings Diabetic Ulcer Left Heel   Wound location: Left heel  Change dressing Daily  The dressing must stay dry and intact. You may shower with dressing protected by cast cover or bag. Or you may sponge bathe. Call wound center or home health nurse if dressing becomes wet.   Apply Zinc to mona wound  Apply 1/4 strength dakins moistened gauze wet to dry dressing to wound bed  Cover with ABD  Secure with rolled gauze and tape.   Change dressing daily    Continue 3-4 servings protein in diet daily  Globo ped shoe to left foot at all times foot will hit the floor    Please obtain heel offloading boot such as prevalon boot  Continue to offload with prevalon heel boot AT ALL TIMES when in bed, if it does not stay in place then try pillow under your calf to float the heel    Continue skilled VNA for dressing changes 3 x week    Follow up Dr. Church in 1 week     Today's wound treatment note:  Cleansed with normal saline  Redressed as ordered above     Standing Status:   Future     Standing Expiration Date:   2/28/2025    Debridement Diabetic Ulcer Left Heel     This order was created via procedure documentation        Diagnosis ICD-10-CM Associated Orders   1. Ulcer of heel, left, with necrosis of muscle ([facility])  L97.423 lidocaine (LMX) 4 % cream     Wound cleansing and dressings Diabetic Ulcer Left Heel     Debridement Diabetic Ulcer Left Heel      2. Type 2 diabetes mellitus with chronic kidney disease on chronic dialysis, without long-term current use of insulin ([facility])  E11.22 lidocaine (LMX) 4 % cream    N18.6 Wound cleansing and dressings Diabetic Ulcer Left Heel    Z99.2 Debridement Diabetic Ulcer Left Heel      3.  Peripheral arterial disease (HCC)  I73.9 lidocaine (LMX) 4 % cream     Wound cleansing and dressings Diabetic Ulcer Left Heel

## 2024-02-29 NOTE — PATIENT INSTRUCTIONS
Orders Placed This Encounter   Procedures    Wound cleansing and dressings Diabetic Ulcer Left Heel     Wound cleansing and dressings Diabetic Ulcer Left Heel   Wound location: Left heel  Change dressing Daily  The dressing must stay dry and intact. You may shower with dressing protected by cast cover or bag. Or you may sponge bathe. Call wound center or home health nurse if dressing becomes wet.   Apply Zinc to mona wound  Apply 1/4 strength dakins moistened gauze wet to dry dressing to wound bed  Cover with ABD  Secure with rolled gauze and tape.   Change dressing daily    Continue 3-4 servings protein in diet daily  Globo ped shoe to left foot at all times foot will hit the floor    Please obtain heel offloading boot such as prevalon boot  Continue to offload with prevalon heel boot AT ALL TIMES when in bed, if it does not stay in place then try pillow under your calf to float the heel    Continue skilled VNA for dressing changes 3 x week    Follow up Dr. Church in 1 week     Today's wound treatment note:  Cleansed with normal saline  Redressed as ordered above     Standing Status:   Future     Standing Expiration Date:   2/28/2025

## 2024-03-02 ENCOUNTER — HOME CARE VISIT (OUTPATIENT)
Dept: HOME HEALTH SERVICES | Facility: HOME HEALTHCARE | Age: 64
End: 2024-03-02
Payer: MEDICARE

## 2024-03-02 PROCEDURE — G0299 HHS/HOSPICE OF RN EA 15 MIN: HCPCS

## 2024-03-03 VITALS
DIASTOLIC BLOOD PRESSURE: 70 MMHG | HEART RATE: 87 BPM | OXYGEN SATURATION: 98 % | TEMPERATURE: 98.3 F | RESPIRATION RATE: 18 BRPM | SYSTOLIC BLOOD PRESSURE: 110 MMHG

## 2024-03-03 DIAGNOSIS — R07.9 CHEST PAIN, UNSPECIFIED: ICD-10-CM

## 2024-03-03 DIAGNOSIS — I25.10 CAD, MULTIPLE VESSEL: ICD-10-CM

## 2024-03-04 RX ORDER — METOPROLOL SUCCINATE 50 MG/1
50 TABLET, EXTENDED RELEASE ORAL 2 TIMES DAILY
Qty: 180 TABLET | Refills: 3 | Status: SHIPPED | OUTPATIENT
Start: 2024-03-04

## 2024-03-05 ENCOUNTER — HOME CARE VISIT (OUTPATIENT)
Dept: HOME HEALTH SERVICES | Facility: HOME HEALTHCARE | Age: 64
End: 2024-03-05
Payer: MEDICARE

## 2024-03-05 VITALS
OXYGEN SATURATION: 93 % | SYSTOLIC BLOOD PRESSURE: 104 MMHG | RESPIRATION RATE: 16 BRPM | TEMPERATURE: 96.6 F | HEART RATE: 70 BPM | DIASTOLIC BLOOD PRESSURE: 60 MMHG

## 2024-03-05 PROCEDURE — G0299 HHS/HOSPICE OF RN EA 15 MIN: HCPCS

## 2024-03-07 ENCOUNTER — HOME CARE VISIT (OUTPATIENT)
Dept: HOME HEALTH SERVICES | Facility: HOME HEALTHCARE | Age: 64
End: 2024-03-07
Payer: MEDICARE

## 2024-03-07 ENCOUNTER — OFFICE VISIT (OUTPATIENT)
Dept: WOUND CARE | Facility: HOSPITAL | Age: 64
End: 2024-03-07
Payer: MEDICARE

## 2024-03-07 VITALS
DIASTOLIC BLOOD PRESSURE: 87 MMHG | HEART RATE: 70 BPM | TEMPERATURE: 96.6 F | RESPIRATION RATE: 16 BRPM | SYSTOLIC BLOOD PRESSURE: 118 MMHG

## 2024-03-07 DIAGNOSIS — Z99.2 TYPE 2 DIABETES MELLITUS WITH CHRONIC KIDNEY DISEASE ON CHRONIC DIALYSIS, WITHOUT LONG-TERM CURRENT USE OF INSULIN (HCC): ICD-10-CM

## 2024-03-07 DIAGNOSIS — N18.6 TYPE 2 DIABETES MELLITUS WITH CHRONIC KIDNEY DISEASE ON CHRONIC DIALYSIS, WITHOUT LONG-TERM CURRENT USE OF INSULIN (HCC): ICD-10-CM

## 2024-03-07 DIAGNOSIS — L97.423 ULCER OF HEEL, LEFT, WITH NECROSIS OF MUSCLE (HCC): Primary | ICD-10-CM

## 2024-03-07 DIAGNOSIS — E11.22 TYPE 2 DIABETES MELLITUS WITH CHRONIC KIDNEY DISEASE ON CHRONIC DIALYSIS, WITHOUT LONG-TERM CURRENT USE OF INSULIN (HCC): ICD-10-CM

## 2024-03-07 DIAGNOSIS — I73.9 PERIPHERAL ARTERIAL DISEASE (HCC): ICD-10-CM

## 2024-03-07 PROCEDURE — 11042 DBRDMT SUBQ TIS 1ST 20SQCM/<: CPT | Performed by: PODIATRIST

## 2024-03-07 RX ORDER — LIDOCAINE 40 MG/G
CREAM TOPICAL ONCE
Status: COMPLETED | OUTPATIENT
Start: 2024-03-07 | End: 2024-03-07

## 2024-03-07 RX ADMIN — LIDOCAINE: 40 CREAM TOPICAL at 08:15

## 2024-03-07 NOTE — PROGRESS NOTES
"Patient ID: Asad Galloway is a 64 y.o. male Date of Birth 1960     Chief Complaint  Chief Complaint   Patient presents with    Follow Up Wound Care Visit     Left heel wound       Allergies  Patient has no known allergies.    Assessment:    No problem-specific Assessment & Plan notes found for this encounter.       Diagnoses and all orders for this visit:    Ulcer of heel, left, with necrosis of muscle (HCC)  -     Wound cleansing and dressings Diabetic Ulcer Left Heel; Future  -     lidocaine (LMX) 4 % cream  -     Wound off loading Diabetic Ulcer Left Heel; Future  -     Wound home care Diabetic Ulcer Left Heel; Future    Type 2 diabetes mellitus with chronic kidney disease on chronic dialysis, without long-term current use of insulin (HCC)    Peripheral arterial disease (HCC)    Other orders  -     Debridement        Debridement   Wound 06/02/23 Diabetic Ulcer Heel Left    Universal Protocol:  Consent: Verbal consent obtained.  Risks and benefits: risks, benefits and alternatives were discussed  Consent given by: patient  Time out: Immediately prior to procedure a \"time out\" was called to verify the correct patient, procedure, equipment, support staff and site/side marked as required.  Timeout called at: 3/7/2024 8:35 AM.  Patient understanding: patient states understanding of the procedure being performed  Patient identity confirmed: verbally with patient    Debridement Details  Performed by: physician  Debridement type: surgical  Level of debridement: subcutaneous tissue  Pain control: lidocaine 4%      Post-debridement measurements  Length (cm): 1.9  Width (cm): 1.5  Depth (cm): 1.1  Percent debrided: 100%  Surface Area (cm^2): 2.85  Area Debrided (cm^2): 2.85  Volume (cm^3): 3.14    Tissue and other material debrided: dermis, epidermis and subcutaneous tissue  Devitalized tissue debrided: biofilm, callus, fibrin and slough  Instrument(s) utilized: blade  Bleeding: small  Hemostasis obtained with: " pressure  Procedural pain (0-10): 0  Post-procedural pain: 0   Response to treatment: procedure was tolerated well        Plan:  Reviewed medical records.  Reviewed vascular note.  Discussed his condition and prognosis.  Pt to follow-up with vascular surgery next week.  Continue Dakin solution to left heel wound bid.  Discussed with him and his son that patient compliance would  be required to help with his condition.  High risk for limb loss due to the extent of the wound and comorbidities.     Globoped shoe for off-loading.  Prevalon boot at night.    RA in 1 week.        Wound 06/02/23 Diabetic Ulcer Heel Left (Active)   Enter Oliveros score: Oliveros Grade 2: Deep ulcer extended to ligament, tendon, joint capsule, bone, or deep fascia without abscess or osteomyelitis (OM) 03/07/24 0809   Wound Image Images linked 03/07/24 0829   Wound Description Pink;White;Granulation tissue 03/07/24 0809   Lani-wound Assessment Maceration;Callus 03/07/24 0809   Wound Length (cm) 1.8 cm 03/07/24 0809   Wound Width (cm) 1.5 cm 03/07/24 0809   Wound Depth (cm) 1.1 cm 03/07/24 0809   Wound Surface Area (cm^2) 2.7 cm^2 03/07/24 0809   Wound Volume (cm^3) 2.97 cm^3 03/07/24 0809   Calculated Wound Volume (cm^3) 2.97 cm^3 03/07/24 0809   Drainage Amount Moderate 03/07/24 0809   Drainage Description Serosanguineous 03/07/24 0809   Non-staged Wound Description Full thickness 03/07/24 0809   Dressing Status Intact 03/07/24 0809       Wound 06/02/23 Diabetic Ulcer Heel Left (Active)   Date First Assessed/Time First Assessed: 06/02/23 1908   Primary Wound Type: Diabetic Ulcer  Location: Heel  Wound Location Orientation: Left  Dressing Status: Clean;Dry;Intact       [REMOVED] Wound 01/29/23 Abrasion(s) Pretibial Right (Removed)   Resolved Date/Resolved Time: 12/21/23 0834  Date First Assessed/Time First Assessed: 01/29/23 1218   Traumatic Wound Type: Abrasion(s)  Location: Pretibial  Wound Location Orientation: Right  Wound Description  (Comments): Scabbed  Wound Outcome: Other...       [REMOVED] Wound 02/01/23 Wrist Anterior;Right (Removed)   Resolved Date: 12/26/23  Date First Assessed/Time First Assessed: 02/01/23 1806   Location: Wrist  Wound Location Orientation: Anterior;Right       [REMOVED] Wound 02/01/23 Skin Tear Abrasion(s) Arm Left;Posterior;Proximal (Removed)   Resolved Date: 12/26/23  Date First Assessed/Time First Assessed: 02/01/23 1930   Primary Wound Type: Skin Tear  Traumatic Wound Type: Abrasion(s)  Location: Arm  Wound Location Orientation: Left;Posterior;Proximal       [REMOVED] Wound 09/29/23 Groin Right (Removed)   Resolved Date: 12/26/23  Date First Assessed/Time First Assessed: 09/29/23 0957   Location: Groin  Wound Location Orientation: Right  Wound Description (Comments): New Puncture site noted   Incision's 1st Dressing: ADHESIVE SKIN HIGH VISCOSITY EXOFIN ...       [REMOVED] Wound 02/26/24 Groin Left (Removed)   Resolved Date: 03/05/24  Date First Assessed/Time First Assessed: 02/26/24 1410   Location: Groin  Wound Location Orientation: Left  Wound Description (Comments): LEFT GROIN ACCESS SITE, SOFT, DRY, NO HEMATOMA  Incision's 1st Dressing: ADHESIVE SKIN H...       Subjective:          HPI    The patient presents for evaluation of left heel ulcer.  No increased pain.  No new complaint.  He started Prevalon boot last night.  He cannot use knee scooter because he is not coordinated.        The following portions of the patient's history were reviewed and updated as appropriate: allergies, current medications, past family history, past medical history, past social history, past surgical history, and problem list.      PAST MEDICAL HISTORY:  Past Medical History:   Diagnosis Date    Cerebrovascular accident (CVA) due to thrombosis of left middle cerebral artery (HCC) 07/29/2018    Chronic kidney disease     Coronary artery disease     Diabetes mellitus (HCC)     not on meds    Dialysis patient (HCC)     M-W-F    Fistula  "    left upper arm for hemodialyis    GERD (gastroesophageal reflux disease)     History of coronary artery stent placement     x3    Hypercholesteremia     Hyperlipidemia     Hypertension     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    Limb alert care status     no BP/IV left arm    Neuropathy     Obesity     Osteomyelitis (HCC)     last assessed 11/4/16-per son not currently    PVC's (premature ventricular contractions)     sees [facility]    Stroke (HCC)     last weeof July 2018 [facility]    TIA (transient ischemic attack) 10/28/2018    Wears dentures     Wears glasses        PAST SURGICAL HISTORY:  Past Surgical History:   Procedure Laterality Date    ABDOMINAL SURGERY      CARDIAC CATHETERIZATION N/A 05/02/2022    Procedure: Cardiac Coronary Angiogram;  Surgeon: Sam Davis MD;  [facility];  Service: Cardiology    CARDIAC CATHETERIZATION N/A 05/02/2022    Procedure: Cardiac pci;  Surgeon: Sam Davis MD;  [facility];  Service: Cardiology    CARDIAC CATHETERIZATION  02/01/2023    Procedure: Cardiac catheterization;  Surgeon: Sam Davis MD;  [facility];  Service: Cardiology    CARDIAC CATHETERIZATION N/A 02/01/2023    Procedure: Cardiac pci;  Surgeon: Sam Davis MD;  [facility];  Service: Cardiology    CARDIAC CATHETERIZATION N/A 02/01/2023    Procedure: Cardiac Coronary Angiogram;  Surgeon: Sam Davis MD;  [facility];  Service: Cardiology    CARDIAC CATHETERIZATION N/A 02/01/2023    Procedure: Cardiac other-IVUS;  Surgeon: Sam Davis MD;  [facility];  Service: Cardiology    CHOLECYSTECTOMY      Percutaneous    COLONOSCOPY      CYSTOSCOPY      HEMODIALYSIS ADULT  1/22/2024    HEMODIALYSIS ADULT  1/24/2024    IR LOWER EXTREMITY ANGIOGRAM  9/29/2023    IR LOWER EXTREMITY ANGIOGRAM  2/26/2024    OTHER SURGICAL HISTORY      \"stimulator to control bowel movements\"    DE " "ESOPHAGOGASTRODUODENOSCOPY TRANSORAL DIAGNOSTIC N/A 09/27/2016    Procedure: ESOPHAGOGASTRODUODENOSCOPY (EGD);  Surgeon: Adele Rowe MD;  Location: [facility] GI LAB;  Service: Gastroenterology    MT LAPAROSCOPY SURG CHOLECYSTECTOMY N/A 02/29/2016    Procedure: LAPAROSCOPIC CHOLECYSTECTOMY ;  Surgeon: Cliff Roman DO;  Location: [facility];  Service: General    MT SLCTV CATHJ 3RD+ ORD SLCTV ABDL PEL/LXTR BRNCH Left 9/29/2023    Procedure: Left leg angiogram with intervention;  Surgeon: Michelle Galvan MD;  Location[facility];  Service: Vascular    MT SLCTV CATHJ 3RD+ ORD SLCTV ABDL PEL/LXTR BRNCH Left 2/26/2024    Procedure: Left leg angiogram antegrade access, left popliteal angioplasty;  Surgeon: Michelle Galvan MD;  Location[facility];  Service: Vascular    ROTATOR CUFF REPAIR Right     SPINAL CORD STIMULATOR IMPLANT      \"Medtronic interstim model # 3058- in lower back to control bowel movements-currently turned off-battery is dead\"    TOE AMPUTATION Right 10/28/2016    Procedure: 3RD TOE AMPUTATION ;  Surgeon: Anjel Salas DPM;  Location[facility];  Service:         ALLERGIES:  Patient has no known allergies.    MEDICATIONS:  Current Outpatient Medications   Medication Sig Dispense Refill    aspirin (ECOTRIN LOW STRENGTH) 81 mg EC tablet Take 81 mg by mouth daily Resume on 8/14        atorvastatin (LIPITOR) 40 mg tablet TAKE 1 TABLET BY MOUTH DAILY WITH DINNER 90 tablet 1    Blood Glucose Monitoring Suppl (True Metrix Meter) w/Device KIT Use to test blood sugars 3 times daily 1 kit 0    Blood Pressure Monitoring (BLOOD PRESSURE CUFF) MISC Use to check blood pressure before taking blood pressure medication and 1 hour after and follow instructions provided in discharge instructions based on the readings. 1 each 0    Cholecalciferol (Vitamin D3) 1.25 MG (48443 UT) CAPS TAKE 1 CAPSULE BY MOUTH ONE TIME PER WEEK 12 capsule 3    divalproex sodium (DEPAKOTE SPRINKLE) 125 MG capsule TAKE 2 " CAPSULES (250 MG TOTAL) BY MOUTH EVERY 12 (TWELVE) HOURS 360 capsule 1    glucose blood (True Metrix Blood Glucose Test) test strip Use 1 each 3 (three) times a day Use as instructed 300 strip 1    Lancets MISC Use 3 (three) times a day Use 3 times daily 300 each 0    metoprolol succinate (TOPROL-XL) 50 mg 24 hr tablet TAKE 1 TABLET BY MOUTH TWICE A  tablet 3    prasugrel (EFFIENT) tablet Take 1 tablet (5 mg total) by mouth daily (Patient taking differently: Take 5 mg by mouth daily Takes with pudding) 90 tablet 3    sacubitril-valsartan (Entresto) 24-26 MG TABS Take 1 tablet by mouth in the morning Bedtime 90 tablet 3    Sevelamer Carbonate 2.4 g PACK VIGOROUSLY MIX CONTENTS OF 1 PACKET IN WATER (IT WILL NOT DISSOLVE) AND DRINK 3 TIMES DAILY WITH MEALS      sodium hypochlorite (DAKIN'S HALF-STRENGTH) external solution Apply 1 Application topically every other day At each dressing change 473 mL 0     No current facility-administered medications for this visit.       SOCIAL HISTORY:  Social History     Socioeconomic History    Marital status: /Civil Union     Spouse name: None    Number of children: None    Years of education: None    Highest education level: Not asked   Occupational History    Occupation: disabled   Tobacco Use    Smoking status: Never    Smokeless tobacco: Never   Vaping Use    Vaping status: Never Used   Substance and Sexual Activity    Alcohol use: Never    Drug use: No    Sexual activity: Not Currently     Partners: Female     Comment: defer   Other Topics Concern    None   Social History Narrative    Daily caffeine consumption 2-3 servings a day     Social Determinants of Health     Financial Resource Strain: Patient Declined (7/13/2023)    Overall Financial Resource Strain (CARDIA)     Difficulty of Paying Living Expenses: Patient declined   Food Insecurity: No Food Insecurity (1/19/2024)    Hunger Vital Sign     Worried About Running Out of Food in the Last Year: Never true      Ran Out of Food in the Last Year: Never true   Transportation Needs: No Transportation Needs (1/19/2024)    PRAPARE - Transportation     Lack of Transportation (Medical): No     Lack of Transportation (Non-Medical): No   Physical Activity: Not on file   Stress: No Stress Concern Present (1/18/2019)    [place] [place] [employer] - Occupational Stress Questionnaire     Feeling of Stress : Only a little   Social Connections: Unknown (1/18/2019)    Social Connection and Isolation Panel [NHANES]     Frequency of Communication with Friends and Family: Not on file     Frequency of Social Gatherings with Friends and Family: Not on file     Attends Anabaptist Services: Not on file     Active Member of Clubs or Organizations: Not on file     Attends Club or Organization Meetings: Not on file     Marital Status:    Intimate Partner Violence: Not At Risk (1/18/2019)    Humiliation, Afraid, Rape, and Kick questionnaire     Fear of Current or Ex-Partner: No     Emotionally Abused: No     Physically Abused: No     Sexually Abused: No   Housing Stability: Low Risk  (1/19/2024)    Housing Stability Vital Sign     Unable to Pay for Housing in the Last Year: No     Number of Places Lived in the Last Year: 1     Unstable Housing in the Last Year: No        Review of Systems   Constitutional:  Negative for chills and fever.   Respiratory:  Negative for cough and shortness of breath.    Cardiovascular:  Negative for chest pain.   Gastrointestinal:  Negative for nausea and vomiting.   Skin:  Positive for wound.   Neurological:  Positive for numbness. Negative for weakness.         Objective:       Wound 06/02/23 Diabetic Ulcer Heel Left (Active)   Enter Oliveros score: Oliveros Grade 2: Deep ulcer extended to ligament, tendon, joint capsule, bone, or deep fascia without abscess or osteomyelitis (OM) 03/07/24 0809   Wound Image Images linked 03/07/24 0829   Wound Description Pink;White;Granulation tissue 03/07/24 0809    Lani-wound Assessment Maceration;Callus 03/07/24 0809   Wound Length (cm) 1.8 cm 03/07/24 0809   Wound Width (cm) 1.5 cm 03/07/24 0809   Wound Depth (cm) 1.1 cm 03/07/24 0809   Wound Surface Area (cm^2) 2.7 cm^2 03/07/24 0809   Wound Volume (cm^3) 2.97 cm^3 03/07/24 0809   Calculated Wound Volume (cm^3) 2.97 cm^3 03/07/24 0809   Drainage Amount Moderate 03/07/24 0809   Drainage Description Serosanguineous 03/07/24 0809   Non-staged Wound Description Full thickness 03/07/24 0809   Dressing Status Intact 03/07/24 0809       /87   Pulse 70   Temp (!) 96.6 °F (35.9 °C)   Resp 16     Physical Exam  Vitals and nursing note reviewed.   Constitutional:       General: He is not in acute distress.     Appearance: He is not toxic-appearing or diaphoretic.   Cardiovascular:      Rate and Rhythm: Normal rate and regular rhythm.      Pulses:           Dorsalis pedis pulses are 1+ on the left side.        Posterior tibial pulses are detected w/ Doppler on the left side.   Pulmonary:      Effort: Pulmonary effort is normal. No respiratory distress.   Musculoskeletal:         General: No signs of injury.      Right foot: No foot drop.      Left foot: No foot drop.   Feet:      Comments: Biphasic PTA left.  Skin:     General: Skin is warm.      Capillary Refill: Capillary refill takes less than 2 seconds.      Coloration: Skin is not cyanotic or mottled.      Findings: No abscess or erythema.      Nails: There is no clubbing.      Comments: Oliveros 2 DFU noted left heel.  Reduced bioburden.  Increased granular tissues.  No direct probe to bone.  No purulence or redness.  No signs of active infection. See wound assessment and photo.     Neurological:      General: No focal deficit present.      Mental Status: He is alert and oriented to person, place, and time.      Cranial Nerves: No cranial nerve deficit.      Sensory: No sensory deficit.      Motor: No weakness.      Coordination: Coordination normal.   Psychiatric:          Mood and Affect: Mood normal.         Behavior: Behavior normal.         Thought Content: Thought content normal.         Judgment: Judgment normal.           Wound Instructions:  Orders Placed This Encounter   Procedures    Wound cleansing and dressings Diabetic Ulcer Left Heel     Wound location: Left heel  Change dressing Daily  The dressing must stay dry and intact. You may shower with dressing protected by cast cover or bag. Or you may sponge bathe. Call wound center or home health nurse if dressing becomes wet.   Apply Zinc to mona wound  Apply 1/4 strength dakins moistened gauze wet to dry dressing to wound bed  Cover with ABD  Secure with rolled gauze and tape.   Change dressing daily    Treatment completed as above today at [facility].     Standing Status:   Future     Standing Expiration Date:   3/7/2025    Wound off loading Diabetic Ulcer Left Heel     Continue to wear Globoped shoe when out of bed.    When in bed, please keep Prevalon boot on at all times to offload heel.     Standing Status:   Future     Standing Expiration Date:   3/7/2025    Wound home care Diabetic Ulcer Left Heel     [facility] for continued wound care at home.     Standing Status:   Future     Standing Expiration Date:   3/7/2025    Debridement     This order was created via procedure documentation        Diagnosis ICD-10-CM Associated Orders   1. Ulcer of heel, left, with necrosis of muscle ([facility])  L97.423 Wound cleansing and dressings Diabetic Ulcer Left Heel     lidocaine (LMX) 4 % cream     Wound off loading Diabetic Ulcer Left Heel     Wound home care Diabetic Ulcer Left Heel      2. Type 2 diabetes mellitus with chronic kidney disease on chronic dialysis, without long-term current use of insulin ([facility])  E11.22     N18.6     Z99.2       3. Peripheral arterial disease ([facility])  I73.9

## 2024-03-07 NOTE — PATIENT INSTRUCTIONS
Orders Placed This Encounter   Procedures    Wound cleansing and dressings Diabetic Ulcer Left Heel     Wound location: Left heel  Change dressing Daily  The dressing must stay dry and intact. You may shower with dressing protected by cast cover or bag. Or you may sponge bathe. Call wound center or home health nurse if dressing becomes wet.   Apply Zinc to mona wound  Apply 1/4 strength dakins moistened gauze wet to dry dressing to wound bed  Cover with ABD  Secure with rolled gauze and tape.   Change dressing daily    Treatment completed as above today at [facility].     Standing Status:   Future     Standing Expiration Date:   3/7/2025    Wound off loading Diabetic Ulcer Left Heel     Continue to wear Globoped shoe when out of bed.    When in bed, please keep Prevalon boot on at all times to offload heel.     Standing Status:   Future     Standing Expiration Date:   3/7/2025    Wound home care Diabetic Ulcer Left Heel     [facility] for continued wound care at home.     Standing Status:   Future     Standing Expiration Date:   3/7/2025

## 2024-03-07 NOTE — CASE MANAGEMENT
Case Management Discharge Planning Note    Patient name Yared Joaquin  Location [facility] 957-67 MRN [id number]  : 1960 Date 2023       Current Admission Date: 2023  Current Admission Diagnosis:Elevated troponin with chest pain   Patient Active Problem List    Diagnosis Date Noted   • Hyponatremia 2023   • Pre-syncope 2023   • SOB (shortness of breath) 10/21/2022   • Left arm swelling 10/21/2022   • Coronary artery disease involving native coronary artery of native heart without angina pectoris 2022   • ESRD (end stage renal disease) ([facility] ) 06/10/2022   • Electrolyte abnormality 2022   • Chest pain 2022   • Generalized weakness 2022   • Elevated troponin with chest pain 2022   • Primary hypertension 2022   • Penetrating foot wound, left, initial encounter 2022   • Emotional lability 2022   • Enteritis 2022   • Leukocytosis 2022   • History of 2019 novel coronavirus disease (COVID-19) 2020   • ESRD on dialysis ([facility] ) 2020   • Anemia 10/05/2020   • S/P arteriovenous (AV) fistula creation 2020   • Pre-kidney transplant, listed 2020   • Urge incontinence 2019   • Overactive bladder 2019   • Anxiety associated with depression 2019   • symptomatic hypoglycemia 10/28/2018   • History of stroke 10/04/2018   • Abnormal EEG 2018   • Other constipation 2018   • GERD (gastroesophageal reflux disease) 2018   • Diabetic macular edema ([facility] ) 2018   • Behavior concern in adult 2018   • Elevated alkaline phosphatase level 2018   • Nephrotic range proteinuria 2018   • Type 2 diabetes mellitus with chronic kidney disease on chronic dialysis, with long-term current use of insulin ([facility] ) 2016   • Dizziness 2016   • Mixed hyperlipidemia 2016   • Diabetic polyneuropathy associated with type 2 diabetes mellitus ([facility] ) 2016      LOS (days): 1  Geometric Mean LOS (GMLOS) (days): 3 50  Days to GMLOS:2 4     OBJECTIVE:  Risk of Unplanned Readmission Score: 54 26         Current admission status: Inpatient   Preferred Pharmacy:   [facility]/pharmacy #[id number]- [place] - [address] [address]  Phone: [phone] Fax: [phone]    Primary Care Provider: Xi Dietz DO    Primary Insurance: MEDICARE  Secondary Insurance: [place] [employer]    DISCHARGE DETAILS:                     Additional Comments: Patient is expected to be discharged tomorrow  Life Vest prescription received, faxed to [place] [phone] with all required clinical information, and [place] [phone] liaison notified  CM to follow  show

## 2024-03-09 ENCOUNTER — HOME CARE VISIT (OUTPATIENT)
Dept: HOME HEALTH SERVICES | Facility: HOME HEALTHCARE | Age: 64
End: 2024-03-09
Payer: MEDICARE

## 2024-03-09 VITALS
DIASTOLIC BLOOD PRESSURE: 75 MMHG | HEART RATE: 65 BPM | RESPIRATION RATE: 18 BRPM | OXYGEN SATURATION: 99 % | SYSTOLIC BLOOD PRESSURE: 130 MMHG | TEMPERATURE: 97.4 F

## 2024-03-09 PROCEDURE — G0299 HHS/HOSPICE OF RN EA 15 MIN: HCPCS

## 2024-03-10 NOTE — CASE COMMUNICATION
Ship to . Home   Branch: Izzy Quinones MD: Franklin Church DPM   Wound 1 left heel Full   Gauze Kerlix (fluff roll) 4inch sterile [id number]   ABD 5x9 [id number]

## 2024-03-12 ENCOUNTER — HOME CARE VISIT (OUTPATIENT)
Dept: HOME HEALTH SERVICES | Facility: HOME HEALTHCARE | Age: 64
End: 2024-03-12
Payer: MEDICARE

## 2024-03-12 VITALS
RESPIRATION RATE: 16 BRPM | TEMPERATURE: 97.1 F | OXYGEN SATURATION: 98 % | SYSTOLIC BLOOD PRESSURE: 102 MMHG | HEART RATE: 63 BPM | DIASTOLIC BLOOD PRESSURE: 66 MMHG

## 2024-03-12 PROCEDURE — G0299 HHS/HOSPICE OF RN EA 15 MIN: HCPCS

## 2024-03-14 ENCOUNTER — HOME CARE VISIT (OUTPATIENT)
Dept: HOME HEALTH SERVICES | Facility: HOME HEALTHCARE | Age: 64
End: 2024-03-14
Payer: MEDICARE

## 2024-03-14 ENCOUNTER — OFFICE VISIT (OUTPATIENT)
Dept: WOUND CARE | Facility: HOSPITAL | Age: 64
End: 2024-03-14
Payer: MEDICARE

## 2024-03-14 VITALS
SYSTOLIC BLOOD PRESSURE: 102 MMHG | HEART RATE: 63 BPM | TEMPERATURE: 96.5 F | DIASTOLIC BLOOD PRESSURE: 55 MMHG | RESPIRATION RATE: 16 BRPM

## 2024-03-14 DIAGNOSIS — E11.22 TYPE 2 DIABETES MELLITUS WITH CHRONIC KIDNEY DISEASE ON CHRONIC DIALYSIS, WITHOUT LONG-TERM CURRENT USE OF INSULIN (HCC): ICD-10-CM

## 2024-03-14 DIAGNOSIS — Z99.2 TYPE 2 DIABETES MELLITUS WITH CHRONIC KIDNEY DISEASE ON CHRONIC DIALYSIS, WITHOUT LONG-TERM CURRENT USE OF INSULIN (HCC): ICD-10-CM

## 2024-03-14 DIAGNOSIS — N18.6 TYPE 2 DIABETES MELLITUS WITH CHRONIC KIDNEY DISEASE ON CHRONIC DIALYSIS, WITHOUT LONG-TERM CURRENT USE OF INSULIN (HCC): ICD-10-CM

## 2024-03-14 DIAGNOSIS — L97.423 ULCER OF HEEL, LEFT, WITH NECROSIS OF MUSCLE (HCC): Primary | ICD-10-CM

## 2024-03-14 DIAGNOSIS — I73.9 PERIPHERAL ARTERIAL DISEASE (HCC): ICD-10-CM

## 2024-03-14 PROCEDURE — 11042 DBRDMT SUBQ TIS 1ST 20SQCM/<: CPT | Performed by: PODIATRIST

## 2024-03-14 RX ORDER — LIDOCAINE 40 MG/G
CREAM TOPICAL ONCE
Status: COMPLETED | OUTPATIENT
Start: 2024-03-14 | End: 2024-03-14

## 2024-03-14 RX ADMIN — LIDOCAINE: 40 CREAM TOPICAL at 08:29

## 2024-03-14 NOTE — PATIENT INSTRUCTIONS
Orders Placed This Encounter   Procedures    Wound cleansing and dressings Diabetic Ulcer Left Heel     Wound location: Left heel  Change dressing Daily  The dressing must stay dry and intact. You may shower with dressing protected by cast cover or bag. Or you may sponge bathe. Call wound center or home health nurse if dressing becomes wet.   Apply Zinc to mona wound  Apply 1/4 strength dakins moistened gauze wet to dry dressing to wound bed  Cover with ABD  Secure with rolled gauze and tape.   Change dressing daily     Treatment completed as above today at [facility].     Standing Status:   Future     Standing Expiration Date:   3/14/2025    Wound off loading Diabetic Ulcer Left Heel     · Wound off loading Diabetic Ulcer Left Heel      Continue to wear Globoped shoe when out of bed.    When in bed, please keep Prevalon boot on at all times to offload heel.     Standing Status:   Future     Standing Expiration Date:   3/14/2025    Wound home care Diabetic Ulcer Left Heel     [facility]     Standing Status:   Future     Standing Expiration Date:   3/14/2025

## 2024-03-14 NOTE — PROGRESS NOTES
"Patient ID: Asad Galloway is a 64 y.o. male Date of Birth 1960     Chief Complaint  Chief Complaint   Patient presents with    Follow Up Wound Care Visit     Left foot wound       Allergies  Patient has no known allergies.    Assessment:    No problem-specific Assessment & Plan notes found for this encounter.       Diagnoses and all orders for this visit:    Ulcer of heel, left, with necrosis of muscle (HCC)  -     lidocaine (LMX) 4 % cream  -     Wound cleansing and dressings Diabetic Ulcer Left Heel; Future  -     Wound off loading Diabetic Ulcer Left Heel; Future  -     Wound home care Diabetic Ulcer Left Heel; Future    Type 2 diabetes mellitus with chronic kidney disease on chronic dialysis, without long-term current use of insulin (HCC)    Peripheral arterial disease (HCC)    Other orders  -     Debridement        Debridement   Wound 06/02/23 Diabetic Ulcer Heel Left    Universal Protocol:  Consent: Verbal consent obtained.  Risks and benefits: risks, benefits and alternatives were discussed  Consent given by: patient  Time out: Immediately prior to procedure a \"time out\" was called to verify the correct patient, procedure, equipment, support staff and site/side marked as required.  Timeout called at: 3/14/2024 8:28 AM.  Patient understanding: patient states understanding of the procedure being performed  Patient identity confirmed: verbally with patient    Debridement Details  Performed by: physician  Debridement type: surgical  Level of debridement: subcutaneous tissue  Pain control: lidocaine 4%      Post-debridement measurements  Length (cm): 1  Width (cm): 1.3  Depth (cm): 0.9  Percent debrided: 100%  Surface Area (cm^2): 1.3  Area Debrided (cm^2): 1.3  Volume (cm^3): 1.17    Tissue and other material debrided: dermis, epidermis and subcutaneous tissue  Devitalized tissue debrided: callus, fibrin and slough  Instrument(s) utilized: blade  Bleeding: small  Hemostasis obtained with: " pressure  Procedural pain (0-10): 0  Post-procedural pain: 0   Response to treatment: procedure was tolerated well        Plan:  Reviewed medical records.  Discussed his condition and prognosis.  Pt to follow-up with vascular surgery next week.  Continue Dakin solution to left heel wound bid.  Discussed with him and his son that patient compliance would  be required to help with his condition.  High risk for limb loss due to the extent of the wound and comorbidities.     Globoped shoe for off-loading.  Prevalon boot at night.    RA in 1 week.        Wound 06/02/23 Diabetic Ulcer Heel Left (Active)   Wound Image Images linked 03/14/24 0839   Wound Description Pink;White;Granulation tissue 03/14/24 0823   Lani-wound Assessment Maceration;Callus;Dry 03/14/24 0823   Wound Length (cm) 0.9 cm 03/14/24 0823   Wound Width (cm) 1.3 cm 03/14/24 0823   Wound Depth (cm) 0.9 cm 03/14/24 0823   Wound Surface Area (cm^2) 1.17 cm^2 03/14/24 0823   Wound Volume (cm^3) 1.053 cm^3 03/14/24 0823   Calculated Wound Volume (cm^3) 1.05 cm^3 03/14/24 0823   Drainage Amount Moderate 03/14/24 0823   Drainage Description Serosanguineous;Green 03/14/24 0823   Non-staged Wound Description Full thickness 03/14/24 0823   Patient Tolerance Tolerated well 03/14/24 0823   Dressing Status Intact 03/14/24 0823       Wound 06/02/23 Diabetic Ulcer Heel Left (Active)   Date First Assessed/Time First Assessed: 06/02/23 1908   Primary Wound Type: Diabetic Ulcer  Location: Heel  Wound Location Orientation: Left  Dressing Status: Clean;Dry;Intact       [REMOVED] Wound 01/29/23 Abrasion(s) Pretibial Right (Removed)   Resolved Date/Resolved Time: 12/21/23 0834  Date First Assessed/Time First Assessed: 01/29/23 1218   Traumatic Wound Type: Abrasion(s)  Location: Pretibial  Wound Location Orientation: Right  Wound Description (Comments): Scabbed  Wound Outcome: Other...       [REMOVED] Wound 02/01/23 Wrist Anterior;Right (Removed)   Resolved Date: 12/26/23  Date  First Assessed/Time First Assessed: 02/01/23 1806   Location: Wrist  Wound Location Orientation: Anterior;Right       [REMOVED] Wound 02/01/23 Skin Tear Abrasion(s) Arm Left;Posterior;Proximal (Removed)   Resolved Date: 12/26/23  Date First Assessed/Time First Assessed: 02/01/23 1930   Primary Wound Type: Skin Tear  Traumatic Wound Type: Abrasion(s)  Location: Arm  Wound Location Orientation: Left;Posterior;Proximal       [REMOVED] Wound 09/29/23 Groin Right (Removed)   Resolved Date: 12/26/23  Date First Assessed/Time First Assessed: 09/29/23 0957   Location: Groin  Wound Location Orientation: Right  Wound Description (Comments): New Puncture site noted   Incision's 1st Dressing: ADHESIVE SKIN HIGH VISCOSITY EXOFIN ...       [REMOVED] Wound 02/26/24 Groin Left (Removed)   Resolved Date: 03/05/24  Date First Assessed/Time First Assessed: 02/26/24 1410   Location: Groin  Wound Location Orientation: Left  Wound Description (Comments): LEFT GROIN ACCESS SITE, SOFT, DRY, NO HEMATOMA  Incision's 1st Dressing: ADHESIVE SKIN H...       Subjective:          HPI    The patient presents for evaluation of left heel ulcer.  No increased pain.  No new complaint.  He was rescheduled with vascular surgery next week.      The following portions of the patient's history were reviewed and updated as appropriate: allergies, current medications, past family history, past medical history, past social history, past surgical history, and problem list.      PAST MEDICAL HISTORY:  Past Medical History:   Diagnosis Date    Cerebrovascular accident (CVA) due to thrombosis of left middle cerebral artery (HCC) 07/29/2018    Chronic kidney disease     Coronary artery disease     Diabetes mellitus (HCC)     not on meds    Dialysis patient ([facility])     M-W-F    Fistula     left upper arm for hemodialyis    GERD (gastroesophageal reflux disease)     History of coronary artery stent placement     x3    Hypercholesteremia     Hyperlipidemia      "Hypertension     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    Limb alert care status     no BP/IV left arm    Neuropathy     Obesity     Osteomyelitis (HCC)     last assessed 11/4/16-per son not currently    PVC's (premature ventricular contractions)     sees [facility]    Stroke (HCC)     last weeof July 2018 [facility]    TIA (transient ischemic attack) 10/28/2018    Wears dentures     Wears glasses        PAST SURGICAL HISTORY:  Past Surgical History:   Procedure Laterality Date    ABDOMINAL SURGERY      CARDIAC CATHETERIZATION N/A 05/02/2022    Procedure: Cardiac Coronary Angiogram;  Surgeon: Sam Davis MD;  [facility];  Service: Cardiology    CARDIAC CATHETERIZATION N/A 05/02/2022    Procedure: Cardiac pci;  Surgeon: Sam Davis MD;  [facility];  Service: Cardiology    CARDIAC CATHETERIZATION  02/01/2023    Procedure: Cardiac catheterization;  Surgeon: Sam Davis MD;  [facility];  Service: Cardiology    CARDIAC CATHETERIZATION N/A 02/01/2023    Procedure: Cardiac pci;  Surgeon: Sam Davis MD;  [facility];  Service: Cardiology    CARDIAC CATHETERIZATION N/A 02/01/2023    Procedure: Cardiac Coronary Angiogram;  Surgeon: Sam Davis MD;  [facility];  Service: Cardiology    CARDIAC CATHETERIZATION N/A 02/01/2023    Procedure: Cardiac other-IVUS;  Surgeon: Sam Davis MD;  [facility];  Service: Cardiology    CHOLECYSTECTOMY      Percutaneous    COLONOSCOPY      CYSTOSCOPY      HEMODIALYSIS ADULT  1/22/2024    HEMODIALYSIS ADULT  1/24/2024    IR LOWER EXTREMITY ANGIOGRAM  9/29/2023    IR LOWER EXTREMITY ANGIOGRAM  2/26/2024    OTHER SURGICAL HISTORY      \"stimulator to control bowel movements\"    MA ESOPHAGOGASTRODUODENOSCOPY TRANSORAL DIAGNOSTIC N/A 09/27/2016    Procedure: ESOPHAGOGASTRODUODENOSCOPY (EGD);  Surgeon: Adele Rowe MD;  Location: [facility];  Service: " "Gastroenterology    FL LAPAROSCOPY SURG CHOLECYSTECTOMY N/A 02/29/2016    Procedure: LAPAROSCOPIC CHOLECYSTECTOMY ;  Surgeon: Cliff Roman DO;  Location: [facility];  Service: General    FL SLCTV CATHJ 3RD+ ORD SLCTV ABDL PEL/LXTR BRNCH Left 9/29/2023    Procedure: Left leg angiogram with intervention;  Surgeon: Michelle Galvan MD;  Location: [facility];  Service: Vascular    FL SLCTV CATHJ 3RD+ ORD SLCTV ABDL PEL/LXTR BRNCH Left 2/26/2024    Procedure: Left leg angiogram antegrade access, left popliteal angioplasty;  Surgeon: Michelle Galvan MD;  Location: [facility];  Service: Vascular    ROTATOR CUFF REPAIR Right     SPINAL CORD STIMULATOR IMPLANT      \"Medtronic interstim model # 3058- in lower back to control bowel movements-currently turned off-battery is dead\"    TOE AMPUTATION Right 10/28/2016    Procedure: 3RD TOE AMPUTATION ;  Surgeon: Anjel Salas DPM;  Location: [facility];  Service:         ALLERGIES:  Patient has no known allergies.    MEDICATIONS:  Current Outpatient Medications   Medication Sig Dispense Refill    aspirin (ECOTRIN LOW STRENGTH) 81 mg EC tablet Take 81 mg by mouth daily Resume on 8/14        atorvastatin (LIPITOR) 40 mg tablet TAKE 1 TABLET BY MOUTH DAILY WITH DINNER 90 tablet 1    Blood Glucose Monitoring Suppl (True Metrix Meter) w/Device KIT Use to test blood sugars 3 times daily 1 kit 0    Blood Pressure Monitoring (BLOOD PRESSURE CUFF) MISC Use to check blood pressure before taking blood pressure medication and 1 hour after and follow instructions provided in discharge instructions based on the readings. 1 each 0    Cholecalciferol (Vitamin D3) 1.25 MG (75710 UT) CAPS TAKE 1 CAPSULE BY MOUTH ONE TIME PER WEEK 12 capsule 3    divalproex sodium (DEPAKOTE SPRINKLE) 125 MG capsule TAKE 2 CAPSULES (250 MG TOTAL) BY MOUTH EVERY 12 (TWELVE) HOURS 360 capsule 1    glucose blood (True Metrix Blood Glucose Test) test strip Use 1 each 3 (three) times a day Use as " instructed 300 strip 1    Lancets MISC Use 3 (three) times a day Use 3 times daily 300 each 0    metoprolol succinate (TOPROL-XL) 50 mg 24 hr tablet TAKE 1 TABLET BY MOUTH TWICE A  tablet 3    prasugrel (EFFIENT) tablet Take 1 tablet (5 mg total) by mouth daily (Patient taking differently: Take 5 mg by mouth daily Takes with pudding) 90 tablet 3    sacubitril-valsartan (Entresto) 24-26 MG TABS Take 1 tablet by mouth in the morning Bedtime 90 tablet 3    Sevelamer Carbonate 2.4 g PACK VIGOROUSLY MIX CONTENTS OF 1 PACKET IN WATER (IT WILL NOT DISSOLVE) AND DRINK 3 TIMES DAILY WITH MEALS      sodium hypochlorite (DAKIN'S HALF-STRENGTH) external solution Apply 1 Application topically every other day At each dressing change 473 mL 0     No current facility-administered medications for this visit.       SOCIAL HISTORY:  Social History     Socioeconomic History    Marital status: /Civil Union     Spouse name: None    Number of children: None    Years of education: None    Highest education level: Not asked   Occupational History    Occupation: disabled   Tobacco Use    Smoking status: Never    Smokeless tobacco: Never   Vaping Use    Vaping status: Never Used   Substance and Sexual Activity    Alcohol use: Never    Drug use: No    Sexual activity: Not Currently     Partners: Female     Comment: defer   Other Topics Concern    None   Social History Narrative    Daily caffeine consumption 2-3 servings a day     Social Determinants of Health     Financial Resource Strain: Patient Declined (7/13/2023)    Overall Financial Resource Strain (CARDIA)     Difficulty of Paying Living Expenses: Patient declined   Food Insecurity: No Food Insecurity (1/19/2024)    Hunger Vital Sign     Worried About Running Out of Food in the Last Year: Never true     Ran Out of Food in the Last Year: Never true   Transportation Needs: No Transportation Needs (1/19/2024)    PRAPARE - Transportation     Lack of Transportation (Medical):  No     Lack of Transportation (Non-Medical): No   Physical Activity: Not on file   Stress: No Stress Concern Present (1/18/2019)    [employer] - Occupational Stress Questionnaire     Feeling of Stress : Only a little   Social Connections: Unknown (1/18/2019)    Social Connection and Isolation Panel [NHANES]     Frequency of Communication with Friends and Family: Not on file     Frequency of Social Gatherings with Friends and Family: Not on file     Attends Evangelical Services: Not on file     Active Member of Clubs or Organizations: Not on file     Attends Club or Organization Meetings: Not on file     Marital Status:    Intimate Partner Violence: Not At Risk (1/18/2019)    Humiliation, Afraid, Rape, and Kick questionnaire     Fear of Current or Ex-Partner: No     Emotionally Abused: No     Physically Abused: No     Sexually Abused: No   Housing Stability: Low Risk  (1/19/2024)    Housing Stability Vital Sign     Unable to Pay for Housing in the Last Year: No     Number of Places Lived in the Last Year: 1     Unstable Housing in the Last Year: No        Review of Systems   Constitutional:  Negative for chills and fever.   Respiratory:  Negative for cough and shortness of breath.    Cardiovascular:  Negative for chest pain.   Gastrointestinal:  Negative for nausea and vomiting.   Skin:  Positive for wound.   Neurological:  Positive for numbness. Negative for weakness.         Objective:       Wound 06/02/23 Diabetic Ulcer Heel Left (Active)   Wound Image Images linked 03/14/24 0839   Wound Description Pink;White;Granulation tissue 03/14/24 0823   Lani-wound Assessment Maceration;Callus;Dry 03/14/24 0823   Wound Length (cm) 0.9 cm 03/14/24 0823   Wound Width (cm) 1.3 cm 03/14/24 0823   Wound Depth (cm) 0.9 cm 03/14/24 0823   Wound Surface Area (cm^2) 1.17 cm^2 03/14/24 0823   Wound Volume (cm^3) 1.053 cm^3 03/14/24 0823   Calculated Wound Volume (cm^3) 1.05 cm^3 03/14/24 0823    Drainage Amount Moderate 03/14/24 0823   Drainage Description Serosanguineous;Green 03/14/24 0823   Non-staged Wound Description Full thickness 03/14/24 0823   Patient Tolerance Tolerated well 03/14/24 0823   Dressing Status Intact 03/14/24 0823       /55   Pulse 63   Temp (!) 96.5 °F (35.8 °C)   Resp 16     Physical Exam  Vitals and nursing note reviewed.   Constitutional:       General: He is not in acute distress.     Appearance: He is not toxic-appearing or diaphoretic.   Cardiovascular:      Rate and Rhythm: Normal rate and regular rhythm.      Pulses:           Dorsalis pedis pulses are 1+ on the left side.        Posterior tibial pulses are detected w/ Doppler on the left side.   Pulmonary:      Effort: Pulmonary effort is normal. No respiratory distress.   Musculoskeletal:         General: No signs of injury.      Right foot: No foot drop.      Left foot: No foot drop.   Feet:      Comments: Biphasic PTA left.  Skin:     General: Skin is warm.      Capillary Refill: Capillary refill takes less than 2 seconds.      Coloration: Skin is not cyanotic or mottled.      Findings: No abscess or erythema.      Nails: There is no clubbing.      Comments: Oliveros 2 DFU noted left heel.  Reduced bioburden.  Increased granular tissues.  No malodor.  No direct probe to bone.  No purulence or redness.  Periwound keratosis noted.  No signs of active infection.  See wound assessment and photo.     Neurological:      General: No focal deficit present.      Mental Status: He is alert and oriented to person, place, and time.      Cranial Nerves: No cranial nerve deficit.      Sensory: No sensory deficit.      Motor: No weakness.      Coordination: Coordination normal.   Psychiatric:         Mood and Affect: Mood normal.         Behavior: Behavior normal.         Thought Content: Thought content normal.         Judgment: Judgment normal.           Wound Instructions:  Orders Placed This Encounter   Procedures    Wound  cleansing and dressings Diabetic Ulcer Left Heel     Wound location: Left heel  Change dressing Daily  The dressing must stay dry and intact. You may shower with dressing protected by cast cover or bag. Or you may sponge bathe. Call wound center or home health nurse if dressing becomes wet.   Apply Zinc to mona wound  Apply 1/4 strength dakins moistened gauze wet to dry dressing to wound bed  Cover with ABD  Secure with rolled gauze and tape.   Change dressing daily     Treatment completed as above [facility] at [facility].     Standing Status:   Future     Standing Expiration Date:   3/14/2025    Wound off loading Diabetic Ulcer Left Heel     · Wound off loading Diabetic Ulcer Left Heel      Continue to wear Globoped shoe when out of bed.    When in bed, please keep Prevalon boot on at all times to offload heel.     Standing Status:   Future     Standing Expiration Date:   3/14/2025    Wound home care Diabetic Ulcer Left Heel     [facility]     Standing Status:   Future     Standing Expiration Date:   3/14/2025    Debridement     This order was created via procedure documentation        Diagnosis ICD-10-CM Associated Orders   1. Ulcer of heel, left, with necrosis of muscle ([facility])  L97.423 lidocaine (LMX) 4 % cream     Wound cleansing and dressings Diabetic Ulcer Left Heel     Wound off loading Diabetic Ulcer Left Heel     Wound home care Diabetic Ulcer Left Heel      2. Type 2 diabetes mellitus with chronic kidney disease on chronic dialysis, without long-term current use of insulin ([facility])  E11.22     N18.6     Z99.2       3. Peripheral arterial disease ([facility])  I73.9

## 2024-03-16 ENCOUNTER — HOME CARE VISIT (OUTPATIENT)
Dept: HOME HEALTH SERVICES | Facility: HOME HEALTHCARE | Age: 64
End: 2024-03-16
Payer: MEDICARE

## 2024-03-16 VITALS
RESPIRATION RATE: 18 BRPM | HEART RATE: 72 BPM | SYSTOLIC BLOOD PRESSURE: 110 MMHG | TEMPERATURE: 97.8 F | OXYGEN SATURATION: 98 % | DIASTOLIC BLOOD PRESSURE: 62 MMHG

## 2024-03-16 PROCEDURE — G0300 HHS/HOSPICE OF LPN EA 15 MIN: HCPCS

## 2024-03-16 PROCEDURE — 400014 VN F/U

## 2024-03-19 ENCOUNTER — HOME CARE VISIT (OUTPATIENT)
Dept: HOME HEALTH SERVICES | Facility: HOME HEALTHCARE | Age: 64
End: 2024-03-19
Payer: MEDICARE

## 2024-03-19 VITALS
OXYGEN SATURATION: 100 % | RESPIRATION RATE: 16 BRPM | HEART RATE: 62 BPM | DIASTOLIC BLOOD PRESSURE: 70 MMHG | TEMPERATURE: 97.1 F | SYSTOLIC BLOOD PRESSURE: 102 MMHG

## 2024-03-19 PROCEDURE — G0299 HHS/HOSPICE OF RN EA 15 MIN: HCPCS

## 2024-03-20 ENCOUNTER — OFFICE VISIT (OUTPATIENT)
Dept: VASCULAR SURGERY | Facility: CLINIC | Age: 64
End: 2024-03-20
Payer: MEDICARE

## 2024-03-20 VITALS
HEART RATE: 65 BPM | SYSTOLIC BLOOD PRESSURE: 120 MMHG | DIASTOLIC BLOOD PRESSURE: 86 MMHG | HEIGHT: 67 IN | WEIGHT: 212 LBS | BODY MASS INDEX: 33.27 KG/M2

## 2024-03-20 DIAGNOSIS — I70.244 ATHEROSCLEROSIS OF NATIVE ARTERY OF LEFT LOWER EXTREMITY WITH ULCERATION OF HEEL (HCC): Primary | Chronic | ICD-10-CM

## 2024-03-20 PROCEDURE — 99213 OFFICE O/P EST LOW 20 MIN: CPT | Performed by: SURGERY

## 2024-03-20 NOTE — PROGRESS NOTES
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  Imaging was reviewed in detail, angiogram imaging shows dense calcfied occlusion of distal PT.    His wound is improving overall , likley due to recruitment of additional collaterals from proximal and mid PT that was treated effectively with atherectomy.  He is using strict offloading prevalon boots at night.    Puncture site looks good.  Overall improvement.  F/u in 3 months.  F/u doppler in 3 months.       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 male.        HPI  Patient presents s/p LLE angiogram done 2/26/24. He reports ulcer on L heel is small and he denies pain. He sees a home health nurse 3 times a week. He is taking ASA81 and Atorvastatin. He is not a smoker.     The following portions of the patient's history were reviewed and updated as appropriate: allergies, current medications, past family history, past medical history, past social history, past surgical history, and problem list.    Review of Systems   Cardiovascular: Negative.    Musculoskeletal: Negative.    Skin:  Positive for wound.   Neurological: Negative.      I have reviewed the ROS as entered and made changes as necessary.      Objective:      /86 (BP Location: Right arm, Patient Position: Sitting, Cuff Size: Standard)   Pulse 65   Ht 5' 7\" (1.702 m)   Wt 96.2 kg (212 lb)   BMI 33.20 kg/m²          Physical Exam  Vitals and nursing note reviewed.   Constitutional:       Appearance: Normal appearance. He is obese.   HENT:      " Head: Normocephalic and atraumatic.   Cardiovascular:      Rate and Rhythm: Normal rate and regular rhythm.      Pulses:           Dorsalis pedis pulses are detected w/ Doppler on the left side.        Posterior tibial pulses are detected w/ Doppler on the left side.   Musculoskeletal:      Right lower leg: No edema.      Left lower leg: No edema.   Skin:     General: Skin is warm and dry.      Capillary Refill: Capillary refill takes less than 2 seconds.   Neurological:      General: No focal deficit present.      Mental Status: He is alert and oriented to person, place, and time.   Psychiatric:         Mood and Affect: Mood normal.         Behavior: Behavior normal.

## 2024-03-20 NOTE — ASSESSMENT & PLAN NOTE
Pt underwent atherectomy of the left PT, unfortunately distal lesion was very calcified and phoneix micro device did not pass distal PT through so only proximal and mid segments were treated.  Imaging was reviewed in detail, angiogram imaging shows dense calcfied occlusion of distal PT.    His wound is improving overall , likley due to recruitment of additional collaterals from proximal and mid PT that was treated effectively with atherectomy.  He is using strict offloading prevalon boots at night.    Puncture site looks good.  Overall improvement.  F/u in 3 months.  F/u doppler in 3 months.

## 2024-03-22 ENCOUNTER — OFFICE VISIT (OUTPATIENT)
Dept: WOUND CARE | Facility: HOSPITAL | Age: 64
End: 2024-03-22
Payer: MEDICARE

## 2024-03-22 ENCOUNTER — TELEPHONE (OUTPATIENT)
Age: 64
End: 2024-03-22

## 2024-03-22 ENCOUNTER — HOME CARE VISIT (OUTPATIENT)
Dept: HOME HEALTH SERVICES | Facility: HOME HEALTHCARE | Age: 64
End: 2024-03-22
Payer: MEDICARE

## 2024-03-22 VITALS
SYSTOLIC BLOOD PRESSURE: 130 MMHG | HEART RATE: 63 BPM | DIASTOLIC BLOOD PRESSURE: 60 MMHG | TEMPERATURE: 96.3 F | RESPIRATION RATE: 16 BRPM

## 2024-03-22 DIAGNOSIS — I70.244 ATHEROSCLEROSIS OF NATIVE ARTERY OF LEFT LOWER EXTREMITY WITH ULCERATION OF HEEL (HCC): Primary | Chronic | ICD-10-CM

## 2024-03-22 PROBLEM — I25.2 OLD MYOCARDIAL INFARCTION: Status: ACTIVE | Noted: 2024-03-22

## 2024-03-22 PROBLEM — I25.2 OLD MYOCARDIAL INFARCTION: Status: ACTIVE | Noted: 2023-02-02

## 2024-03-22 PROBLEM — I13.2 HYPERTENSIVE HEART AND CHRONIC KIDNEY DISEASE WITH HEART FAILURE AND WITH STAGE 5 CHRONIC KIDNEY DISEASE, OR END STAGE RENAL DISEASE (HCC): Status: ACTIVE | Noted: 2024-03-22

## 2024-03-22 PROCEDURE — 97597 DBRDMT OPN WND 1ST 20 CM/<: CPT | Performed by: PODIATRIST

## 2024-03-22 PROCEDURE — 99213 OFFICE O/P EST LOW 20 MIN: CPT | Performed by: PODIATRIST

## 2024-03-22 RX ORDER — LIDOCAINE 40 MG/G
CREAM TOPICAL ONCE
Status: COMPLETED | OUTPATIENT
Start: 2024-03-22 | End: 2024-03-22

## 2024-03-22 RX ADMIN — LIDOCAINE: 40 CREAM TOPICAL at 08:19

## 2024-03-22 NOTE — TELEPHONE ENCOUNTER
Patients GI provider:  Dr. Rowe    Number to return call: [phone]    Reason for call: Pt son was calling to say that his father has a colonoscopy on 6/13/24. He is wondering if he can have it with sedation only as his father had 4 stents put in last year and is also on Intresto and Effiant. Please reach back out to the pt    Scheduled procedure/appointment date if applicable: procedure  6//13/24

## 2024-03-22 NOTE — PROGRESS NOTES
Patient ID: Asad Galloway is a 64 y.o. male Date of Birth 1960     Diagnosis:  1. 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  Patient has Prevalon boot for at nighttime he is using these.  Continue with the Dakin's and dry sterile dressing changes as he has been doing.    He recently had vascular intervention he is doing well from this.  Will continue to monitor him closely weekly.  Will have him see Dr. Church next week.    Selective debridement completed today to the area.    Patient is at risk for limb loss, however will continue with limb salvage efforts.  Would recommend continue localized treatments given his peripheral arterial disease would not recommend any aggressive surgical treatment at this time.  If his condition changes further evaluation with repeat imaging would be warranted.    If the patient notices any systemic signs of infection including but not limited to fever, chills, nausea, vomiting or significant worsening of wound with increased drainage, redness or streaking up the foot or leg the patient should notify the office or present to the emergency room.      If wound debridement was completed today this was done in an attempt to promote healing, decrease risk of  infection and for limb salvage efforts.     Goal of treatment: wound healing     Efforts to decrease pressure on the wound(s), evaluation and discussion of nutritional status, peripheral vascular status, and infection control have all been addressed with this patient.    Return in about 1 week (around 3/29/2024) for Recheck, wound assessment.      Chief Complaint   Patient presents with   • Follow Up Wound Care Visit     Left heel wound           Subjective:   Patient returns for follow-up to the wound care center for evaluation of left heel ulceration.  I reviewed his notes from previous evaluations.  I also reviewed his vascular studies that he had completed recently with along with his arteriogram.  He has been using Dakin's and dry sterile dressing to the area of ulceration.  Uses a Globo ped shoe for ambulation.  Uses a Prevalon boot at nighttime.  Denies any new acute changes.  Does have some pain to the heel.  Had a previous MRI completed in January which was negative for osteomyelitis at that time had some marrow edema to this area.        The following portions of the patient's history were reviewed and updated as appropriate:   Patient Active Problem List   Diagnosis   • Type 2 diabetes mellitus with chronic kidney disease on chronic dialysis, without long-term current use of insulin ([facility])   • Dizziness   • Mixed hyperlipidemia   • Antiplatelet or antithrombotic long-term use   • Nephrotic range proteinuria   • Elevated alkaline phosphatase level   • GERD (gastroesophageal reflux disease)   • Other constipation   • Abnormal EEG   • History of stroke   • Anxiety associated with depression   • Urge incontinence   • S/P arteriovenous (AV) fistula creation   • Pre-kidney transplant, listed   • Anemia   • History of 2019 novel coronavirus disease (COVID-19)   • ESRD on dialysis (HCC)   • Penetrating foot wound, left, initial encounter   • Emotional lability   • Primary hypertension   • Generalized weakness   •  Chest pain   • Electrolyte abnormality   • CAD, multiple vessel   • SOB (shortness of breath)   • Left arm swelling   • Ischemic cardiomyopathy   • Moderate AS (aortic stenosis)   • Mood disorder (HCC)   • Diabetic polyneuropathy associated with type 2 diabetes mellitus (HCC)   • Absence of toe of right foot (HCC)   • Hammer toes of both feet   • Elevated troponin   • Presence of external cardiac defibrillator   • Nonhealing ulcer of heel (HCC)   • Rectal bleeding   • Hypotension   • Edema of left lower extremity   • Acute on chronic systolic (congestive) heart failure (HCC)   • 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 (HCC)   • Fall   • Old myocardial infarction   • Hypertensive heart and chronic kidney disease with heart failure and with stage 5 chronic kidney disease, or end stage renal disease (HCC)     Past Medical History:   Diagnosis Date   • Cerebrovascular accident (CVA) due to thrombosis of left middle cerebral artery (HCC) 07/29/2018   • Chronic kidney disease    • Coronary artery disease    • Diabetes mellitus (HCC)     not on meds   • Dialysis patient ([facility])     M-W-F   • Fistula     left upper arm for hemodialyis   • GERD (gastroesophageal reflux disease)    • History of coronary artery stent placement     x3   • Hypercholesteremia    • Hyperlipidemia    • Hypertension    • 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   • Limb alert care status     no BP/IV left arm   • Neuropathy    • Obesity    • Osteomyelitis (HCC)     last assessed 11/4/16-per son not currently   • PVC's (premature ventricular contractions)     sees  Cardio   • Stroke (HCC)     last weeof July 2018 [facility]   • TIA (transient ischemic attack) 10/28/2018   • Wears dentures    • Wears glasses      Past Surgical History:   Procedure Laterality Date   • ABDOMINAL SURGERY     • CARDIAC CATHETERIZATION N/A 05/02/2022    Procedure: Cardiac Coronary Angiogram;  Surgeon: Sam Davis,  "MD;  [facility];  Service: Cardiology   • CARDIAC CATHETERIZATION N/A 05/02/2022    Procedure: Cardiac pci;  Surgeon: Sam Davis MD;  [facility];  Service: Cardiology   • CARDIAC CATHETERIZATION  02/01/2023    Procedure: Cardiac catheterization;  Surgeon: Sam Davis MD;  [facility]: [facility];  Service: Cardiology   • CARDIAC CATHETERIZATION N/A 02/01/2023    Procedure: Cardiac pci;  Surgeon: Sam Davis MD;  [facility];  Service: Cardiology   • CARDIAC CATHETERIZATION N/A 02/01/2023    Procedure: Cardiac Coronary Angiogram;  Surgeon: Sam Davis MD;  [facility];  Service: Cardiology   • CARDIAC CATHETERIZATION N/A 02/01/2023    Procedure: Cardiac other-IVUS;  Surgeon: Sam Davis MD;  [facility]: [facility]ARD[facility];  Service: Cardiology   • CHOLECYSTECTOMY      Percutaneous   • COLONOSCOPY     • CYSTOSCOPY     • HEMODIALYSIS ADULT  1/22/2024   • HEMODIALYSIS ADULT  1/24/2024   • IR LOWER EXTREMITY ANGIOGRAM  9/29/2023   • IR LOWER EXTREMITY ANGIOGRAM  2/26/2024   • OTHER SURGICAL HISTORY      \"stimulator to control bowel movements\"   • CA ESOPHAGOGASTRODUODENOSCOPY TRANSORAL DIAGNOSTIC N/A 09/27/2016    Procedure: ESOPHAGOGASTRODUODENOSCOPY (EGD);  Surgeon: Adele Rowe MD;  Location: [facility] GI LAB;  Service: Gastroenterology   • CA LAPAROSCOPY SURG CHOLECYSTECTOMY N/A 02/29/2016    Procedure: LAPAROSCOPIC CHOLECYSTECTOMY ;  Surgeon: Cliff Roman DO;  Location: [facility] Main [facility];  Service: General   • CA SLCTV CATHJ 3RD+ ORD SLCTV ABDL PEL/LXTR BRNCH Left 9/29/2023    Procedure: Left leg angiogram with intervention;  Surgeon: Michelle Galvan MD;  Location: [facility] MAIN [facility];  Service: Vascular   • CA SLCTV CATHJ 3RD+ ORD SLCTV ABDL PEL/LXTR BRNCH Left 2/26/2024    Procedure: Left leg angiogram antegrade access, left popliteal angioplasty;  Surgeon: Michelle Galvan MD;  Location: [facility] MA[facility];  Service: Vascular   • ROTATOR CUFF " "REPAIR Right    • SPINAL CORD STIMULATOR IMPLANT      \"Medtronic interstim model # 3058- in lower back to control bowel movements-currently turned off-battery is dead\"   • TOE AMPUTATION Right 10/28/2016    Procedure: 3RD TOE AMPUTATION ;  Surgeon: Anjel Salas DPM;  Location: [facility];  Service:      Social History     Socioeconomic History   • Marital status: /Civil Union     Spouse name: Not on file   • Number of children: Not on file   • Years of education: Not on file   • Highest education level: Not asked   Occupational History   • Occupation: disabled   Tobacco Use   • Smoking status: Never   • Smokeless tobacco: Never   Vaping Use   • Vaping status: Never Used   Substance and Sexual Activity   • Alcohol use: Never   • Drug use: No   • Sexual activity: Not Currently     Partners: Female     Comment: defer   Other Topics Concern   • Not on file   Social History Narrative    Daily caffeine consumption 2-3 servings a day     Social Determinants of Health     Financial Resource Strain: Patient Declined (7/13/2023)    Overall Financial Resource Strain (CARDIA)    • Difficulty of Paying Living Expenses: Patient declined   Food Insecurity: No Food Insecurity (1/19/2024)    Hunger Vital Sign    • Worried About Running Out of Food in the Last Year: Never true    • Ran Out of Food in the Last Year: Never true   Transportation Needs: No Transportation Needs (1/19/2024)    PRAPARE - Transportation    • Lack of Transportation (Medical): No    • Lack of Transportation (Non-Medical): No   Physical Activity: Not on file   Stress: No Stress Concern Present (1/18/2019)    [employer] [place] [employer] - Occupational Stress Questionnaire    • Feeling of Stress : Only a little   Social Connections: Unknown (1/18/2019)    Social Connection and Isolation Panel [NHANES]    • Frequency of Communication with Friends and Family: Not on file    • Frequency of Social Gatherings with Friends and Family: Not on " file    • Attends Taoist Services: Not on file    • Active Member of Clubs or Organizations: Not on file    • Attends Club or Organization Meetings: Not on file    • Marital Status:    Intimate Partner Violence: Not At Risk (1/18/2019)    Humiliation, Afraid, Rape, and Kick questionnaire    • Fear of Current or Ex-Partner: No    • Emotionally Abused: No    • Physically Abused: No    • Sexually Abused: No   Housing Stability: Low Risk  (1/19/2024)    Housing Stability Vital Sign    • Unable to Pay for Housing in the Last Year: No    • Number of Places Lived in the Last Year: 1    • Unstable Housing in the Last Year: No        Current Outpatient Medications:   •  aspirin (ECOTRIN LOW STRENGTH) 81 mg EC tablet, Take 81 mg by mouth daily Resume on 8/14  , Disp: , Rfl:   •  atorvastatin (LIPITOR) 40 mg tablet, TAKE 1 TABLET BY MOUTH DAILY WITH DINNER, Disp: 90 tablet, Rfl: 1  •  Blood Glucose Monitoring Suppl (True Metrix Meter) w/Device KIT, Use to test blood sugars 3 times daily, Disp: 1 kit, Rfl: 0  •  Blood Pressure Monitoring (BLOOD PRESSURE CUFF) MISC, Use to check blood pressure before taking blood pressure medication and 1 hour after and follow instructions provided in discharge instructions based on the readings., Disp: 1 each, Rfl: 0  •  Cholecalciferol (Vitamin D3) 1.25 MG (77780 UT) CAPS, TAKE 1 CAPSULE BY MOUTH ONE TIME PER WEEK, Disp: 12 capsule, Rfl: 3  •  divalproex sodium (DEPAKOTE SPRINKLE) 125 MG capsule, TAKE 2 CAPSULES (250 MG TOTAL) BY MOUTH EVERY 12 (TWELVE) HOURS, Disp: 360 capsule, Rfl: 1  •  glucose blood (True Metrix Blood Glucose Test) test strip, Use 1 each 3 (three) times a day Use as instructed, Disp: 300 strip, Rfl: 1  •  Lancets MISC, Use 3 (three) times a day Use 3 times daily, Disp: 300 each, Rfl: 0  •  metoprolol succinate (TOPROL-XL) 50 mg 24 hr tablet, TAKE 1 TABLET BY MOUTH TWICE A DAY, Disp: 180 tablet, Rfl: 3  •  prasugrel (EFFIENT) tablet, Take 1 tablet (5 mg total) by  mouth daily (Patient taking differently: Take 5 mg by mouth daily Takes with pudding), Disp: 90 tablet, Rfl: 3  •  sacubitril-valsartan (Entresto) 24-26 MG TABS, Take 1 tablet by mouth in the morning Bedtime, Disp: 90 tablet, Rfl: 3  •  Sevelamer Carbonate 2.4 g PACK, VIGOROUSLY MIX CONTENTS OF 1 PACKET IN WATER (IT WILL NOT DISSOLVE) AND DRINK 3 TIMES DAILY WITH MEALS, Disp: , Rfl:   •  sodium hypochlorite (DAKIN'S HALF-STRENGTH) external solution, Apply 1 Application topically every other day At each dressing change, Disp: 473 mL, Rfl: 0  No current facility-administered medications for this visit.  Family History   Problem Relation Age of Onset   • Leukemia Mother    • Liver disease Mother    • Lung cancer Mother         heavy smoker - 3 ppd   • Heart disease Father    • Liver disease Father    • Diabetes type I Father    • Multiple myeloma Sister    • Breast cancer Sister    • Urolithiasis Family    • Alcohol abuse Neg Hx    • Depression Neg Hx    • Drug abuse Neg Hx    • Substance Abuse Neg Hx    • Mental illness Neg Hx       Review of Systems   Constitutional:  Negative for chills and fever.   Respiratory:  Negative for shortness of breath and wheezing.    Cardiovascular:  Negative for chest pain and leg swelling.   Gastrointestinal:  Negative for diarrhea, nausea and vomiting.   Neurological:  Negative for numbness.     Allergies:  Patient has no known allergies.      Objective:  /60   Pulse 63   Temp (!) 96.3 °F (35.7 °C)   Resp 16     Physical Exam      Wound 06/02/23 Diabetic Ulcer Heel Left (Active)   Enter Oliveros score: Oliveros Grade 2: Deep ulcer extended to ligament, tendon, joint capsule, bone, or deep fascia without abscess or osteomyelitis (OM) 03/22/24 0817   Wound Image Images linked 03/22/24 0816   Wound Description Pink;Slough;Yellow 03/22/24 0817   Lani-wound Assessment Intact 03/22/24 0817   Wound Length (cm) 1.2 cm 03/22/24 0817   Wound Width (cm) 1.2 cm 03/22/24 0817   Wound Depth  (cm) 0.7 cm 03/22/24 0817   Wound Surface Area (cm^2) 1.44 cm^2 03/22/24 0817   Wound Volume (cm^3) 1.008 cm^3 03/22/24 0817   Calculated Wound Volume (cm^3) 1.01 cm^3 03/22/24 0817   Drainage Amount Small 03/22/24 0817   Drainage Description Serosanguineous 03/22/24 0817   Non-staged Wound Description Full thickness 03/22/24 0817   Dressing Status Intact 03/22/24 0817                  Debridement   Wound 06/02/23 Diabetic Ulcer Heel Left    Universal Protocol:  Consent: Verbal consent obtained.  Risks and benefits: risks, benefits and alternatives were discussed  Consent given by: patient  Patient understanding: patient states understanding of the procedure being performed  Patient identity confirmed: verbally with patient    Debridement Details  Performed by: physician  Debridement type: selective  Pain control: lidocaine 4%      Post-debridement measurements  Length (cm): 1.2  Width (cm): 1.2  Depth (cm): 0.7  Percent debrided: 100%  Surface Area (cm^2): 1.44  Area Debrided (cm^2): 1.44  Volume (cm^3): 1.01    Devitalized tissue debrided: biofilm, callus and fibrin  Instrument(s) utilized: blade  Bleeding: small  Hemostasis obtained with: pressure  Procedural pain (0-10): 3  Post-procedural pain: 1   Response to treatment: procedure was tolerated well         Results from last 6 Months   Lab Units 01/18/24  1408   WOUND CULTURE  3+ Growth of Klebsiella oxytoca*  3+ Growth of Pseudomonas aeruginosa*  3+ Growth of           Wound Instructions:  Orders Placed This Encounter   Procedures   • Wound cleansing and dressings Diabetic Ulcer Left Heel     Wound location: Left heel  Change dressing Daily  The dressing must stay dry and intact. You may shower with dressing protected by cast cover or bag. Or you may sponge bathe. Call wound center or home health nurse if dressing becomes wet.   Apply Zinc to mona wound  Apply 1/4 strength dakins moistened gauze wet to dry dressing to wound bed  Cover with ABD  Secure with  "rolled gauze and tape.   Change dressing daily     Treatment completed as above [facility] at [facility].     Standing Status:   Future     Standing Expiration Date:   3/22/2025   • Wound off loading Diabetic Ulcer Left Heel     ·           Wound off loading Diabetic Ulcer Left Heel                          Continue to wear Globoped shoe when out of bed.    When in bed, please keep Prevalon boot on at all times to offload heel.     Standing Status:   Future     Standing Expiration Date:   3/22/2025   • Wound home care Diabetic Ulcer Left Heel     · Wound home care Diabetic Ulcer Left Heel      [facility]     Standing Status:   Future     Standing Expiration Date:   3/22/2025   • Debridement Diabetic Ulcer Left Heel     This order was created via procedure documentation         Panda Cardoza DPM      Portions of the record may have been created with voice recognition software. Occasional wrong word or \"sound a like\" substitutions may have occurred due to the inherent limitations of voice recognition software. Read the chart carefully and recognize, using context, where substitutions have occurred.    "

## 2024-03-22 NOTE — PATIENT INSTRUCTIONS
Orders Placed This Encounter   Procedures    Wound cleansing and dressings Diabetic Ulcer Left Heel     Wound location: Left heel  Change dressing Daily  The dressing must stay dry and intact. You may shower with dressing protected by cast cover or bag. Or you may sponge bathe. Call wound center or home health nurse if dressing becomes wet.   Apply Zinc to mona wound  Apply 1/4 strength dakins moistened gauze wet to dry dressing to wound bed  Cover with ABD  Secure with rolled gauze and tape.   Change dressing daily     Treatment completed as above today at [facility].     Standing Status:   Future     Standing Expiration Date:   3/22/2025    Wound off loading Diabetic Ulcer Left Heel     ·           Wound off loading Diabetic Ulcer Left Heel                          Continue to wear Globoped shoe when out of bed.    When in bed, please keep Prevalon boot on at all times to offload heel.     Standing Status:   Future     Standing Expiration Date:   3/22/2025    Wound home care Diabetic Ulcer Left Heel     · Wound home care Diabetic Ulcer Left Heel      [facility]     Standing Status:   Future     Standing Expiration Date:   3/22/2025

## 2024-03-26 ENCOUNTER — HOME CARE VISIT (OUTPATIENT)
Dept: HOME HEALTH SERVICES | Facility: HOME HEALTHCARE | Age: 64
End: 2024-03-26
Payer: MEDICARE

## 2024-03-26 VITALS
SYSTOLIC BLOOD PRESSURE: 102 MMHG | OXYGEN SATURATION: 100 % | DIASTOLIC BLOOD PRESSURE: 86 MMHG | RESPIRATION RATE: 16 BRPM | TEMPERATURE: 97.1 F | HEART RATE: 72 BPM

## 2024-03-26 PROCEDURE — G0299 HHS/HOSPICE OF RN EA 15 MIN: HCPCS

## 2024-03-28 ENCOUNTER — HOME CARE VISIT (OUTPATIENT)
Dept: HOME HEALTH SERVICES | Facility: HOME HEALTHCARE | Age: 64
End: 2024-03-28
Payer: MEDICARE

## 2024-04-02 ENCOUNTER — HOME CARE VISIT (OUTPATIENT)
Dept: HOME HEALTH SERVICES | Facility: HOME HEALTHCARE | Age: 64
End: 2024-04-02
Payer: MEDICARE

## 2024-04-02 VITALS
OXYGEN SATURATION: 99 % | RESPIRATION RATE: 16 BRPM | TEMPERATURE: 97.2 F | DIASTOLIC BLOOD PRESSURE: 72 MMHG | HEART RATE: 64 BPM | SYSTOLIC BLOOD PRESSURE: 110 MMHG

## 2024-04-02 PROCEDURE — G0299 HHS/HOSPICE OF RN EA 15 MIN: HCPCS

## 2024-04-04 ENCOUNTER — HOME CARE VISIT (OUTPATIENT)
Dept: HOME HEALTH SERVICES | Facility: HOME HEALTHCARE | Age: 64
End: 2024-04-04
Payer: MEDICARE

## 2024-04-04 ENCOUNTER — OFFICE VISIT (OUTPATIENT)
Dept: WOUND CARE | Facility: HOSPITAL | Age: 64
End: 2024-04-04
Payer: MEDICARE

## 2024-04-04 VITALS
SYSTOLIC BLOOD PRESSURE: 136 MMHG | HEART RATE: 59 BPM | DIASTOLIC BLOOD PRESSURE: 59 MMHG | TEMPERATURE: 97.3 F | RESPIRATION RATE: 12 BRPM

## 2024-04-04 DIAGNOSIS — L97.423 ULCER OF HEEL, LEFT, WITH NECROSIS OF MUSCLE (HCC): Primary | ICD-10-CM

## 2024-04-04 DIAGNOSIS — E11.22 TYPE 2 DIABETES MELLITUS WITH CHRONIC KIDNEY DISEASE ON CHRONIC DIALYSIS, WITHOUT LONG-TERM CURRENT USE OF INSULIN (HCC): ICD-10-CM

## 2024-04-04 DIAGNOSIS — N18.6 TYPE 2 DIABETES MELLITUS WITH CHRONIC KIDNEY DISEASE ON CHRONIC DIALYSIS, WITHOUT LONG-TERM CURRENT USE OF INSULIN (HCC): ICD-10-CM

## 2024-04-04 DIAGNOSIS — Z99.2 TYPE 2 DIABETES MELLITUS WITH CHRONIC KIDNEY DISEASE ON CHRONIC DIALYSIS, WITHOUT LONG-TERM CURRENT USE OF INSULIN (HCC): ICD-10-CM

## 2024-04-04 PROCEDURE — 11042 DBRDMT SUBQ TIS 1ST 20SQCM/<: CPT | Performed by: PODIATRIST

## 2024-04-04 RX ORDER — LIDOCAINE 40 MG/G
CREAM TOPICAL ONCE
Status: COMPLETED | OUTPATIENT
Start: 2024-04-04 | End: 2024-04-04

## 2024-04-04 RX ADMIN — LIDOCAINE: 40 CREAM TOPICAL at 08:33

## 2024-04-04 NOTE — PROGRESS NOTES
"Patient ID: Asad Galloway is a 64 y.o. male Date of Birth 1960     Chief Complaint  Chief Complaint   Patient presents with    Follow Up Wound Care Visit     Left heel wound         Allergies  Patient has no known allergies.    Assessment:    No problem-specific Assessment & Plan notes found for this encounter.       Diagnoses and all orders for this visit:    Ulcer of heel, left, with necrosis of muscle (HCC)  -     lidocaine (LMX) 4 % cream  -     Wound cleansing and dressings; Future  -     Wound off loading; Future  -     Wound home care; Future    Type 2 diabetes mellitus with chronic kidney disease on chronic dialysis, without long-term current use of insulin (HCC)    Other orders  -     Debridement        Debridement   Wound 06/02/23 Diabetic Ulcer Heel Left    Universal Protocol:  Consent: Verbal consent obtained.  Risks and benefits: risks, benefits and alternatives were discussed  Consent given by: patient  Time out: Immediately prior to procedure a \"time out\" was called to verify the correct patient, procedure, equipment, support staff and site/side marked as required.  Timeout called at: 4/4/2024 8:39 AM.  Patient understanding: patient states understanding of the procedure being performed  Patient identity confirmed: verbally with patient    Debridement Details  Performed by: physician  Debridement type: surgical  Level of debridement: subcutaneous tissue      Post-debridement measurements  Length (cm): 1  Width (cm): 1.2  Depth (cm): 0.8  Percent debrided: 100%  Surface Area (cm^2): 1.2  Area Debrided (cm^2): 1.2  Volume (cm^3): 0.96    Tissue and other material debrided: dermis, epidermis and subcutaneous tissue  Devitalized tissue debrided: callus, fibrin and slough  Instrument(s) utilized: blade  Bleeding: small  Hemostasis obtained with: pressure  Procedural pain (0-10): 0  Post-procedural pain: 0   Response to treatment: procedure was tolerated well        Plan:  Reviewed medical records.  " Discussed his condition and prognosis.  Pt to follow-up with vascular surgery next week.  Continue Dakin solution to left heel wound bid.  Discussed with him and his son that patient compliance would  be required to help with his condition.  High risk for limb loss due to the extent of the wound and comorbidities.     Globoped shoe for off-loading.  Prevalon boot at night.  Instructed to increase protein intake.    RA in 1 week.        Wound 06/02/23 Diabetic Ulcer Heel Left (Active)   Enter Oliveros score: Oliveros Grade 2: Deep ulcer extended to ligament, tendon, joint capsule, bone, or deep fascia without abscess or osteomyelitis (OM) 04/04/24 0827   Wound Image Images linked 04/04/24 0844   Wound Description Pink;Slough;Yellow;Granulation tissue 04/04/24 0827   Lani-wound Assessment Intact 04/04/24 0827   Wound Length (cm) 0.9 cm 04/04/24 0827   Wound Width (cm) 1.2 cm 04/04/24 0827   Wound Depth (cm) 0.8 cm 04/04/24 0827   Wound Surface Area (cm^2) 1.08 cm^2 04/04/24 0827   Wound Volume (cm^3) 0.864 cm^3 04/04/24 0827   Calculated Wound Volume (cm^3) 0.86 cm^3 04/04/24 0827   Drainage Amount Small 04/04/24 0827   Drainage Description Serosanguineous 04/04/24 0827   Non-staged Wound Description Full thickness 04/04/24 0827   Dressing Gauze 04/04/24 0827   Wound packed? No 04/04/24 0827   Dressing Status Intact 04/04/24 0827       Wound 06/02/23 Diabetic Ulcer Heel Left (Active)   Date First Assessed/Time First Assessed: 06/02/23 1908   Primary Wound Type: Diabetic Ulcer  Location: Heel  Wound Location Orientation: Left  Dressing Status: Clean;Dry;Intact       [REMOVED] Wound 01/29/23 Abrasion(s) Pretibial Right (Removed)   Resolved Date/Resolved Time: 12/21/23 0834  Date First Assessed/Time First Assessed: 01/29/23 1218   Traumatic Wound Type: Abrasion(s)  Location: Pretibial  Wound Location Orientation: Right  Wound Description (Comments): Scabbed  Wound Outcome: Other...       [REMOVED] Wound 02/01/23 Wrist  Anterior;Right (Removed)   Resolved Date: 12/26/23  Date First Assessed/Time First Assessed: 02/01/23 1806   Location: Wrist  Wound Location Orientation: Anterior;Right       [REMOVED] Wound 02/01/23 Skin Tear Abrasion(s) Arm Left;Posterior;Proximal (Removed)   Resolved Date: 12/26/23  Date First Assessed/Time First Assessed: 02/01/23 1930   Primary Wound Type: Skin Tear  Traumatic Wound Type: Abrasion(s)  Location: Arm  Wound Location Orientation: Left;Posterior;Proximal       [REMOVED] Wound 09/29/23 Groin Right (Removed)   Resolved Date: 12/26/23  Date First Assessed/Time First Assessed: 09/29/23 0957   Location: Groin  Wound Location Orientation: Right  Wound Description (Comments): New Puncture site noted   Incision's 1st Dressing: ADHESIVE SKIN HIGH VISCOSITY EXOFIN ...       [REMOVED] Wound 02/26/24 Groin Left (Removed)   Resolved Date: 03/05/24  Date First Assessed/Time First Assessed: 02/26/24 1410   Location: Groin  Wound Location Orientation: Left  Wound Description (Comments): LEFT GROIN ACCESS SITE, SOFT, DRY, NO HEMATOMA  Incision's 1st Dressing: ADHESIVE SKIN H...       Subjective:          HPI    The patient presents for evaluation of left heel ulcer.  Pain under control.  No new complaint.      The following portions of the patient's history were reviewed and updated as appropriate: allergies, current medications, past family history, past medical history, past social history, past surgical history, and problem list.      PAST MEDICAL HISTORY:  Past Medical History:   Diagnosis Date    Cerebrovascular accident (CVA) due to thrombosis of left middle cerebral artery (HCC) 07/29/2018    Chronic kidney disease     Coronary artery disease     Diabetes mellitus (HCC)     not on meds    Dialysis patient ([facility])     M-W-F    Fistula     left upper arm for hemodialyis    GERD (gastroesophageal reflux disease)     History of coronary artery stent placement     x3    Hypercholesteremia     Hyperlipidemia      "Hypertension     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    Limb alert care status     no BP/IV left arm    Neuropathy     Obesity     Osteomyelitis (HCC)     last assessed 11/4/16-per son not currently    PVC's (premature ventricular contractions)     sees [facility]    Stroke (HCC)     last weeof July 2018 [facility]    TIA (transient ischemic attack) 10/28/2018    Wears dentures     Wears glasses        PAST SURGICAL HISTORY:  Past Surgical History:   Procedure Laterality Date    ABDOMINAL SURGERY      CARDIAC CATHETERIZATION N/A 05/02/2022    Procedure: Cardiac Coronary Angiogram;  Surgeon: Sam Davis MD;  [facility];  Service: Cardiology    CARDIAC CATHETERIZATION N/A 05/02/2022    Procedure: Cardiac pci;  Surgeon: Sam Davis MD;  [facility];  Service: Cardiology    CARDIAC CATHETERIZATION  02/01/2023    Procedure: Cardiac catheterization;  Surgeon: Sam Davis MD;  [facility];  Service: Cardiology    CARDIAC CATHETERIZATION N/A 02/01/2023    Procedure: Cardiac pci;  Surgeon: Sam Davis MD;  [facility];  Service: Cardiology    CARDIAC CATHETERIZATION N/A 02/01/2023    Procedure: Cardiac Coronary Angiogram;  Surgeon: Sam Davis MD;  [facility];  Service: Cardiology    CARDIAC CATHETERIZATION N/A 02/01/2023    Procedure: Cardiac other-IVUS;  Surgeon: Sam Davis MD;  [facility];  Service: Cardiology    CHOLECYSTECTOMY      Percutaneous    COLONOSCOPY      CYSTOSCOPY      HEMODIALYSIS ADULT  1/22/2024    HEMODIALYSIS ADULT  1/24/2024    IR LOWER EXTREMITY ANGIOGRAM  9/29/2023    IR LOWER EXTREMITY ANGIOGRAM  2/26/2024    OTHER SURGICAL HISTORY      \"stimulator to control bowel movements\"    VA ESOPHAGOGASTRODUODENOSCOPY TRANSORAL DIAGNOSTIC N/A 09/27/2016    Procedure: ESOPHAGOGASTRODUODENOSCOPY (EGD);  Surgeon: Adele Rowe MD;  Location: [facility] GI [facility];  Service: " "Gastroenterology    HI LAPAROSCOPY SURG CHOLECYSTECTOMY N/A 02/29/2016    Procedure: LAPAROSCOPIC CHOLECYSTECTOMY ;  Surgeon: Cliff Roman DO;  Location: [facility];  Service: General    HI SLCTV CATHJ 3RD+ ORD SLCTV ABDL PEL/LXTR BRNCH Left 9/29/2023    Procedure: Left leg angiogram with intervention;  Surgeon: Michelle Galvan MD;  Location: [facility];  Service: Vascular    HI SLCTV CATHJ 3RD+ ORD SLCTV ABDL PEL/LXTR BRNCH Left 2/26/2024    Procedure: Left leg angiogram antegrade access, left popliteal angioplasty;  Surgeon: Michelle Galvan MD;  Location: [facility];  Service: Vascular    ROTATOR CUFF REPAIR Right     SPINAL CORD STIMULATOR IMPLANT      \"Medtronic interstim model # 3058- in lower back to control bowel movements-currently turned off-battery is dead\"    TOE AMPUTATION Right 10/28/2016    Procedure: 3RD TOE AMPUTATION ;  Surgeon: Anjel Salas DPM;  Location[facility];  Service:         ALLERGIES:  Patient has no known allergies.    MEDICATIONS:  Current Outpatient Medications   Medication Sig Dispense Refill    aspirin (ECOTRIN LOW STRENGTH) 81 mg EC tablet Take 81 mg by mouth daily Resume on 8/14        atorvastatin (LIPITOR) 40 mg tablet TAKE 1 TABLET BY MOUTH DAILY WITH DINNER 90 tablet 1    Blood Glucose Monitoring Suppl (True Metrix Meter) w/Device KIT Use to test blood sugars 3 times daily 1 kit 0    Blood Pressure Monitoring (BLOOD PRESSURE CUFF) MISC Use to check blood pressure before taking blood pressure medication and 1 hour after and follow instructions provided in discharge instructions based on the readings. 1 each 0    Cholecalciferol (Vitamin D3) 1.25 MG (38712 UT) CAPS TAKE 1 CAPSULE BY MOUTH ONE TIME PER WEEK 12 capsule 3    divalproex sodium (DEPAKOTE SPRINKLE) 125 MG capsule TAKE 2 CAPSULES (250 MG TOTAL) BY MOUTH EVERY 12 (TWELVE) HOURS 360 capsule 1    glucose blood (True Metrix Blood Glucose Test) test strip Use 1 each 3 (three) times a day Use as " instructed 300 strip 1    Lancets MISC Use 3 (three) times a day Use 3 times daily 300 each 0    metoprolol succinate (TOPROL-XL) 50 mg 24 hr tablet TAKE 1 TABLET BY MOUTH TWICE A  tablet 3    prasugrel (EFFIENT) tablet Take 1 tablet (5 mg total) by mouth daily (Patient taking differently: Take 5 mg by mouth daily Takes with pudding) 90 tablet 3    sacubitril-valsartan (Entresto) 24-26 MG TABS Take 1 tablet by mouth in the morning Bedtime 90 tablet 3    Sevelamer Carbonate 2.4 g PACK VIGOROUSLY MIX CONTENTS OF 1 PACKET IN WATER (IT WILL NOT DISSOLVE) AND DRINK 3 TIMES DAILY WITH MEALS      sodium hypochlorite (DAKIN'S HALF-STRENGTH) external solution Apply 1 Application topically every other day At each dressing change 473 mL 0     No current facility-administered medications for this visit.       SOCIAL HISTORY:  Social History     Socioeconomic History    Marital status: /Civil Union     Spouse name: None    Number of children: None    Years of education: None    Highest education level: Not asked   Occupational History    Occupation: disabled   Tobacco Use    Smoking status: Never    Smokeless tobacco: Never   Vaping Use    Vaping status: Never Used   Substance and Sexual Activity    Alcohol use: Never    Drug use: No    Sexual activity: Not Currently     Partners: Female     Comment: defer   Other Topics Concern    None   Social History Narrative    Daily caffeine consumption 2-3 servings a day     Social Determinants of Health     Financial Resource Strain: Patient Declined (7/13/2023)    Overall Financial Resource Strain (CARDIA)     Difficulty of Paying Living Expenses: Patient declined   Food Insecurity: No Food Insecurity (1/19/2024)    Hunger Vital Sign     Worried About Running Out of Food in the Last Year: Never true     Ran Out of Food in the Last Year: Never true   Transportation Needs: No Transportation Needs (1/19/2024)    PRAPARE - Transportation     Lack of Transportation (Medical):  No     Lack of Transportation (Non-Medical): No   Physical Activity: Not on file   Stress: No Stress Concern Present (1/18/2019)    [place] citizen [place] of Occupational [employer] - Occupational Stress Questionnaire     Feeling of Stress : Only a little   Social Connections: Unknown (1/18/2019)    Social Connection and Isolation Panel [NHANES]     Frequency of Communication with Friends and Family: Not on file     Frequency of Social Gatherings with Friends and Family: Not on file     Attends [employer] Services: Not on file     Active Member of Clubs or Organizations: Not on file     Attends Club or Organization Meetings: Not on file     Marital Status:    Intimate Partner Violence: Not At Risk (1/18/2019)    Humiliation, Afraid, Rape, and Kick questionnaire     Fear of Current or Ex-Partner: No     Emotionally Abused: No     Physically Abused: No     Sexually Abused: No   Housing Stability: Low Risk  (1/19/2024)    Housing Stability Vital Sign     Unable to Pay for Housing in the Last Year: No     Number of Places Lived in the Last Year: 1     Unstable Housing in the Last Year: No        Review of Systems   Constitutional:  Negative for chills and fever.   Respiratory:  Negative for cough and shortness of breath.    Cardiovascular:  Negative for chest pain.   Gastrointestinal:  Negative for nausea and vomiting.   Skin:  Positive for wound.   Neurological:  Positive for numbness. Negative for weakness.         Objective:       Wound 06/02/23 Diabetic Ulcer Heel Left (Active)   Enter Oliveros score: Oliveros Grade 2: Deep ulcer extended to ligament, tendon, joint capsule, bone, or deep fascia without abscess or osteomyelitis (OM) 04/04/24 0827   Wound Image Images linked 04/04/24 0844   Wound Description Pink;Slough;Yellow;Granulation tissue 04/04/24 0827   Lani-wound Assessment Intact 04/04/24 0827   Wound Length (cm) 0.9 cm 04/04/24 0827   Wound Width (cm) 1.2 cm 04/04/24 0827   Wound Depth (cm) 0.8 cm 04/04/24 0827    Wound Surface Area (cm^2) 1.08 cm^2 04/04/24 0827   Wound Volume (cm^3) 0.864 cm^3 04/04/24 0827   Calculated Wound Volume (cm^3) 0.86 cm^3 04/04/24 0827   Drainage Amount Small 04/04/24 0827   Drainage Description Serosanguineous 04/04/24 0827   Non-staged Wound Description Full thickness 04/04/24 0827   Dressing Gauze 04/04/24 0827   Wound packed? No 04/04/24 0827   Dressing Status Intact 04/04/24 0827       /59   Pulse 59   Temp (!) 97.3 °F (36.3 °C)   Resp 12     Physical Exam  Vitals and nursing note reviewed.   Constitutional:       General: He is not in acute distress.     Appearance: He is not toxic-appearing or diaphoretic.   Cardiovascular:      Rate and Rhythm: Normal rate and regular rhythm.      Pulses:           Dorsalis pedis pulses are 1+ on the left side.        Posterior tibial pulses are detected w/ Doppler on the left side.   Pulmonary:      Effort: Pulmonary effort is normal. No respiratory distress.   Musculoskeletal:         General: No signs of injury.      Right foot: No foot drop.      Left foot: No foot drop.   Feet:      Comments: Biphasic PTA left.  Skin:     General: Skin is warm.      Capillary Refill: Capillary refill takes less than 2 seconds.      Coloration: Skin is not cyanotic or mottled.      Findings: No abscess or erythema.      Nails: There is no clubbing.      Comments: Oliveros 2 DFU noted left heel.  Reduced bioburden.  Increased granular tissues.  No malodor.  Wound does not probe to bone.  Periosteal area covered with health granular tissues.  No purulence or redness.  Mild periwound keratosis noted.  No signs of infection.  See wound assessment and photo.     Neurological:      General: No focal deficit present.      Mental Status: He is alert and oriented to person, place, and time.      Cranial Nerves: No cranial nerve deficit.      Sensory: No sensory deficit.      Motor: No weakness.      Coordination: Coordination normal.   Psychiatric:         Mood and  Affect: Mood normal.         Behavior: Behavior normal.         Thought Content: Thought content normal.         Judgment: Judgment normal.           Wound Instructions:  Orders Placed This Encounter   Procedures    Wound cleansing and dressings     Wound cleansing and dressings Diabetic Ulcer Left Heel       Wound location: Left heel  Change dressing Daily  The dressing must stay dry and intact. You may shower with dressing protected by cast cover or bag. Or you may sponge bathe. Call wound center or home health nurse if dressing becomes wet.   Apply Zinc to mona wound  Apply 1/4 strength dakins moistened gauze wet to dry dressing to wound bed  Cover with ABD  Secure with rolled gauze and tape.   Change dressing daily     Treatment completed as above today at [facility].     Standing Status:   Future     Standing Expiration Date:   4/4/2025    Wound off loading     Wound off loading Diabetic Ulcer Left Heel                          Continue to wear Globoped shoe when out of bed.    When in bed, please keep Prevalon boot on at all times to offload heel.     Standing Status:   Future     Standing Expiration Date:   4/4/2025    Wound home care     Wound home care Diabetic Ulcer Left Heel                          [facility]     Standing Status:   Future     Standing Expiration Date:   4/4/2025    Debridement     This order was created via procedure documentation        Diagnosis ICD-10-CM Associated Orders   1. Ulcer of heel, left, with necrosis of muscle ([facility])  L97.423 lidocaine (LMX) 4 % cream     Wound cleansing and dressings     Wound off loading     Wound home care      2. Type 2 diabetes mellitus with chronic kidney disease on chronic dialysis, without long-term current use of insulin ([facility])  E11.22     N18.6     Z99.2

## 2024-04-04 NOTE — PATIENT INSTRUCTIONS
Orders Placed This Encounter   Procedures    Wound cleansing and dressings     Wound cleansing and dressings Diabetic Ulcer Left Heel       Wound location: Left heel  Change dressing Daily  The dressing must stay dry and intact. You may shower with dressing protected by cast cover or bag. Or you may sponge bathe. Call wound center or home health nurse if dressing becomes wet.   Apply Zinc to mona wound  Apply 1/4 strength dakins moistened gauze wet to dry dressing to wound bed  Cover with ABD  Secure with rolled gauze and tape.   Change dressing daily     Treatment completed as above today at [facility].     Standing Status:   Future     Standing Expiration Date:   4/4/2025    Wound off loading     Wound off loading Diabetic Ulcer Left Heel                          Continue to wear Globoped shoe when out of bed.    When in bed, please keep Prevalon boot on at all times to offload heel.     Standing Status:   Future     Standing Expiration Date:   4/4/2025    Wound home care     Wound home care Diabetic Ulcer Left Heel                          [facility]     Standing Status:   Future     Standing Expiration Date:   4/4/2025

## 2024-04-10 ENCOUNTER — HOME CARE VISIT (OUTPATIENT)
Dept: HOME HEALTH SERVICES | Facility: HOME HEALTHCARE | Age: 64
End: 2024-04-10
Payer: MEDICARE

## 2024-04-10 VITALS
OXYGEN SATURATION: 95 % | TEMPERATURE: 98.1 F | HEART RATE: 72 BPM | RESPIRATION RATE: 18 BRPM | DIASTOLIC BLOOD PRESSURE: 70 MMHG | SYSTOLIC BLOOD PRESSURE: 136 MMHG

## 2024-04-10 PROCEDURE — G0300 HHS/HOSPICE OF LPN EA 15 MIN: HCPCS

## 2024-04-10 NOTE — CASE COMMUNICATION
Ship to  Pt. Home     Ordering MD JACOB Church    Wound 1 Full  Frequency daily      Gauze 4x4 N/S 200 4x4s per unit  [id number]    1    ABD 5x9 [id number]    14    4in Ace [id number]    3

## 2024-04-11 ENCOUNTER — OFFICE VISIT (OUTPATIENT)
Dept: WOUND CARE | Facility: HOSPITAL | Age: 64
End: 2024-04-11
Payer: MEDICARE

## 2024-04-11 ENCOUNTER — HOME CARE VISIT (OUTPATIENT)
Dept: HOME HEALTH SERVICES | Facility: HOME HEALTHCARE | Age: 64
End: 2024-04-11
Payer: MEDICARE

## 2024-04-11 VITALS
RESPIRATION RATE: 16 BRPM | HEART RATE: 61 BPM | DIASTOLIC BLOOD PRESSURE: 72 MMHG | TEMPERATURE: 96.6 F | SYSTOLIC BLOOD PRESSURE: 109 MMHG

## 2024-04-11 DIAGNOSIS — L97.422 ULCER OF LEFT HEEL, WITH FAT LAYER EXPOSED (HCC): Primary | ICD-10-CM

## 2024-04-11 DIAGNOSIS — I73.9 PERIPHERAL ARTERIAL DISEASE (HCC): ICD-10-CM

## 2024-04-11 DIAGNOSIS — E11.22 TYPE 2 DIABETES MELLITUS WITH CHRONIC KIDNEY DISEASE ON CHRONIC DIALYSIS, WITHOUT LONG-TERM CURRENT USE OF INSULIN (HCC): ICD-10-CM

## 2024-04-11 DIAGNOSIS — N18.6 TYPE 2 DIABETES MELLITUS WITH CHRONIC KIDNEY DISEASE ON CHRONIC DIALYSIS, WITHOUT LONG-TERM CURRENT USE OF INSULIN (HCC): ICD-10-CM

## 2024-04-11 DIAGNOSIS — Z99.2 TYPE 2 DIABETES MELLITUS WITH CHRONIC KIDNEY DISEASE ON CHRONIC DIALYSIS, WITHOUT LONG-TERM CURRENT USE OF INSULIN (HCC): ICD-10-CM

## 2024-04-11 PROCEDURE — 11042 DBRDMT SUBQ TIS 1ST 20SQCM/<: CPT | Performed by: PODIATRIST

## 2024-04-11 RX ORDER — SODIUM HYPOCHLORITE 2.5 MG/ML
SOLUTION TOPICAL
Qty: 473 ML | Refills: 1 | Status: SHIPPED | OUTPATIENT
Start: 2024-04-11

## 2024-04-11 RX ORDER — LIDOCAINE 40 MG/G
CREAM TOPICAL ONCE
Status: COMPLETED | OUTPATIENT
Start: 2024-04-11 | End: 2024-04-11

## 2024-04-11 RX ADMIN — LIDOCAINE: 40 CREAM TOPICAL at 08:37

## 2024-04-11 NOTE — PATIENT INSTRUCTIONS
Orders Placed This Encounter   Procedures    Wound cleansing and dressings Diabetic Ulcer Left Heel     Wound location: Left heel  Change dressing Daily  The dressing must stay dry and intact. You may shower with dressing protected by cast cover or bag. Or you may sponge bathe. Call wound center or home health nurse if dressing becomes wet.   Apply Zinc to mona wound.  Apply 1/4 strength dakins moistened gauze wet to dry dressing to wound bed.  Cover with ABD.  Secure with rolled gauze and tape.             Wound off loading:  Wound off loading Diabetic Ulcer Left Heel  Continue to wear Globoped shoe when out of bed.    When in bed, please keep Prevalon boot on at all times to offload heel.     Wound home care:  [facility]     Standing Status:   Future     Standing Expiration Date:   4/18/2024    Wound Procedure Treatment Diabetic Ulcer Left Heel     This order was created via procedure documentation

## 2024-04-11 NOTE — PROGRESS NOTES
"Patient ID: Asad Galloway is a 64 y.o. male Date of Birth 1960     Chief Complaint  Chief Complaint   Patient presents with    Follow Up Wound Care Visit     Left heel wound.        Allergies  Patient has no known allergies.    Assessment:    No problem-specific Assessment & Plan notes found for this encounter.       Diagnoses and all orders for this visit:    Ulcer of left heel, with fat layer exposed (HCC)  -     lidocaine (LMX) 4 % cream  -     Wound cleansing and dressings Diabetic Ulcer Left Heel; Future  -     Wound Procedure Treatment Diabetic Ulcer Left Heel  -     sodium hypochlorite (DAKIN'S HALF-STRENGTH) external solution; Apply moistened gauze with Dakin to the wound daily.  -     Debridement Diabetic Ulcer Left Heel    Type 2 diabetes mellitus with chronic kidney disease on chronic dialysis, without long-term current use of insulin (HCC)  -     Debridement Diabetic Ulcer Left Heel    Peripheral arterial disease (HCC)        Debridement   Wound 06/02/23 Diabetic Ulcer Heel Left    Universal Protocol:  Consent: Verbal consent obtained.  Risks and benefits: risks, benefits and alternatives were discussed  Consent given by: patient  Time out: Immediately prior to procedure a \"time out\" was called to verify the correct patient, procedure, equipment, support staff and site/side marked as required.  Timeout called at: 4/11/2024 8:45 AM.  Patient understanding: patient states understanding of the procedure being performed  Patient identity confirmed: verbally with patient    Debridement Details  Performed by: physician  Debridement type: surgical  Level of debridement: subcutaneous tissue  Pain control: lidocaine 4%      Post-debridement measurements  Length (cm): 0.7  Width (cm): 1.2  Depth (cm): 0.7  Percent debrided: 100%  Surface Area (cm^2): 0.84  Area Debrided (cm^2): 0.84  Volume (cm^3): 0.59    Tissue and other material debrided: dermis, epidermis and subcutaneous tissue  Devitalized tissue " debrided: fibrin and slough  Instrument(s) utilized: blade  Bleeding: small  Hemostasis obtained with: pressure  Procedural pain (0-10): 0  Post-procedural pain: 0   Response to treatment: procedure was tolerated well        Plan:  Reviewed medical records.  Discussed his condition and prognosis.  Pt to follow-up with vascular surgery next week.  Wound is smaller.  Continue Dakin solution.  Discussed with him and his son that patient compliance would  be required to help with his condition.  High risk for limb loss due to the extent of the wound and comorbidities.     Globoped shoe for off-loading.  Prevalon boot at night.    RA in 1 week.        Wound 06/02/23 Diabetic Ulcer Heel Left (Active)   Enter Oliveros score: Oliveros Grade 2: Deep ulcer extended to ligament, tendon, joint capsule, bone, or deep fascia without abscess or osteomyelitis (OM) 04/11/24 0840   Wound Image Images linked 04/11/24 0854   Wound Description Pink;Slough;Yellow;Granulation tissue 04/11/24 0840   Lani-wound Assessment Intact 04/11/24 0840   Wound Length (cm) 0.6 cm 04/11/24 0840   Wound Width (cm) 1.2 cm 04/11/24 0840   Wound Depth (cm) 0.7 cm 04/11/24 0840   Wound Surface Area (cm^2) 0.72 cm^2 04/11/24 0840   Wound Volume (cm^3) 0.504 cm^3 04/11/24 0840   Calculated Wound Volume (cm^3) 0.5 cm^3 04/11/24 0840   Number of underminings 1 04/11/24 0840   Undermining 1 0.4 04/11/24 0840   Undermining 1 is depth extending from 12 to 3 04/11/24 0840   Drainage Amount Small 04/11/24 0840   Drainage Description Serosanguineous;Milky;Yellow 04/11/24 0840   Non-staged Wound Description Full thickness 04/11/24 0840   Wound packed? Yes 04/11/24 0840   Packing- # removed 1 04/11/24 0840   Packing- # inserted 1 04/11/24 0840   Patient Tolerance Tolerated well 04/11/24 0840   Dressing Status Intact 04/11/24 0840       Wound 06/02/23 Diabetic Ulcer Heel Left (Active)   Date First Assessed/Time First Assessed: 06/02/23 1908   Primary Wound Type: Diabetic  Ulcer  Location: Heel  Wound Location Orientation: Left  Dressing Status: Clean;Dry;Intact       [REMOVED] Wound 01/29/23 Abrasion(s) Pretibial Right (Removed)   Resolved Date/Resolved Time: 12/21/23 0834  Date First Assessed/Time First Assessed: 01/29/23 1218   Traumatic Wound Type: Abrasion(s)  Location: Pretibial  Wound Location Orientation: Right  Wound Description (Comments): Scabbed  Wound Outcome: Other...       [REMOVED] Wound 02/01/23 Wrist Anterior;Right (Removed)   Resolved Date: 12/26/23  Date First Assessed/Time First Assessed: 02/01/23 1806   Location: Wrist  Wound Location Orientation: Anterior;Right       [REMOVED] Wound 02/01/23 Skin Tear Abrasion(s) Arm Left;Posterior;Proximal (Removed)   Resolved Date: 12/26/23  Date First Assessed/Time First Assessed: 02/01/23 1930   Primary Wound Type: Skin Tear  Traumatic Wound Type: Abrasion(s)  Location: Arm  Wound Location Orientation: Left;Posterior;Proximal       [REMOVED] Wound 09/29/23 Groin Right (Removed)   Resolved Date: 12/26/23  Date First Assessed/Time First Assessed: 09/29/23 0957   Location: Groin  Wound Location Orientation: Right  Wound Description (Comments): New Puncture site noted   Incision's 1st Dressing: ADHESIVE SKIN HIGH VISCOSITY EXOFIN ...       [REMOVED] Wound 02/26/24 Groin Left (Removed)   Resolved Date: 03/05/24  Date First Assessed/Time First Assessed: 02/26/24 1410   Location: Groin  Wound Location Orientation: Left  Wound Description (Comments): LEFT GROIN ACCESS SITE, SOFT, DRY, NO HEMATOMA  Incision's 1st Dressing: ADHESIVE SKIN H...       Subjective:          HPI    The patient presents for evaluation of left heel ulcer.  Pain under control.  No new complaint.  BS under control.    The following portions of the patient's history were reviewed and updated as appropriate: allergies, current medications, past family history, past medical history, past social history, past surgical history, and problem list.      PAST MEDICAL  HISTORY:  Past Medical History:   Diagnosis Date    Cerebrovascular accident (CVA) due to thrombosis of left middle cerebral artery (HCC) 07/29/2018    Chronic kidney disease     Coronary artery disease     Diabetes mellitus (HCC)     not on meds    Dialysis patient (HCC)     M-W-F    Fistula     left upper arm for hemodialyis    GERD (gastroesophageal reflux disease)     History of coronary artery stent placement     x3    Hypercholesteremia     Hyperlipidemia     Hypertension     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    Limb alert care status     no BP/IV left arm    Neuropathy     Obesity     Osteomyelitis (HCC)     last assessed 11/4/16-per son not currently    PVC's (premature ventricular contractions)     sees [facility]    Stroke (HCC)     last we[facility]of July 2018 [facility]    TIA (transient ischemic attack) 10/28/2018    Wears dentures     Wears glasses        PAST SURGICAL HISTORY:  Past Surgical History:   Procedure Laterality Date    ABDOMINAL SURGERY      CARDIAC CATHETERIZATION N/A 05/02/2022    Procedure: Cardiac Coronary Angiogram;  Surgeon: Sam Davis MD;  [facility];  Service: Cardiology    CARDIAC CATHETERIZATION N/A 05/02/2022    Procedure: Cardiac pci;  Surgeon: Sam Davis MD;  [facility];  Service: Cardiology    CARDIAC CATHETERIZATION  02/01/2023    Procedure: Cardiac catheterization;  Surgeon: Sam Davis MD;  [facility];  Service: Cardiology    CARDIAC CATHETERIZATION N/A 02/01/2023    Procedure: Cardiac pci;  Surgeon: Sam Davis MD;  [facility];  Service: Cardiology    CARDIAC CATHETERIZATION N/A 02/01/2023    Procedure: Cardiac Coronary Angiogram;  Surgeon: Sam Davis MD;  [facility];  Service: Cardiology    CARDIAC CATHETERIZATION N/A 02/01/2023    Procedure: Cardiac other-IVUS;  Surgeon: Sam Davis MD;  [facility];  Service: Cardiology     "CHOLECYSTECTOMY      Percutaneous    COLONOSCOPY      CYSTOSCOPY      HEMODIALYSIS ADULT  1/22/2024    HEMODIALYSIS ADULT  1/24/2024    IR LOWER EXTREMITY ANGIOGRAM  9/29/2023    IR LOWER EXTREMITY ANGIOGRAM  2/26/2024    OTHER SURGICAL HISTORY      \"stimulator to control bowel movements\"    MS ESOPHAGOGASTRODUODENOSCOPY TRANSORAL DIAGNOSTIC N/A 09/27/2016    Procedure: ESOPHAGOGASTRODUODENOSCOPY (EGD);  Surgeon: Adele Rowe MD;  Location: [facility];  Service: Gastroenterology    MS LAPAROSCOPY SURG CHOLECYSTECTOMY N/A 02/29/2016    Procedure: LAPAROSCOPIC CHOLECYSTECTOMY ;  Surgeon: Cliff Roman DO;  Location[facility];  Service: General    MS SLCTV CATHJ 3RD+ ORD SLCTV ABDL PEL/LXTR BRNCH Left 9/29/2023    Procedure: Left leg angiogram with intervention;  Surgeon: Michelle Galvan MD;  [facility];  Service: Vascular    MS SLCTV CATHJ 3RD+ ORD SLCTV ABDL PEL/LXTR BRNCH Left 2/26/2024    Procedure: Left leg angiogram antegrade access, left popliteal angioplasty;  Surgeon: Michelle Galvan MD;  [facility];  Service: Vascular    ROTATOR CUFF REPAIR Right     SPINAL CORD STIMULATOR IMPLANT      \"Medtronic interstim model # 3058- in lower back to control bowel movements-currently turned off-battery is dead\"    TOE AMPUTATION Right 10/28/2016    Procedure: 3RD TOE AMPUTATION ;  Surgeon: Anjel Salas DPM;  [facility];  Service:         ALLERGIES:  Patient has no known allergies.    MEDICATIONS:  Current Outpatient Medications   Medication Sig Dispense Refill    sodium hypochlorite (DAKIN'S HALF-STRENGTH) external solution Apply moistened gauze with Dakin to the wound daily. 473 mL 1    aspirin (ECOTRIN LOW STRENGTH) 81 mg EC tablet Take 81 mg by mouth daily Resume on 8/14        atorvastatin (LIPITOR) 40 mg tablet TAKE 1 TABLET BY MOUTH DAILY WITH DINNER 90 tablet 1    Blood Glucose Monitoring Suppl (True Metrix Meter) w/Device KIT Use to test blood sugars 3 times " daily 1 kit 0    Blood Pressure Monitoring (BLOOD PRESSURE CUFF) MISC Use to check blood pressure before taking blood pressure medication and 1 hour after and follow instructions provided in discharge instructions based on the readings. 1 each 0    Cholecalciferol (Vitamin D3) 1.25 MG (48711 UT) CAPS TAKE 1 CAPSULE BY MOUTH ONE TIME PER WEEK 12 capsule 3    divalproex sodium (DEPAKOTE SPRINKLE) 125 MG capsule TAKE 2 CAPSULES (250 MG TOTAL) BY MOUTH EVERY 12 (TWELVE) HOURS 360 capsule 1    glucose blood (True Metrix Blood Glucose Test) test strip Use 1 each 3 (three) times a day Use as instructed 300 strip 1    Lancets MISC Use 3 (three) times a day Use 3 times daily 300 each 0    metoprolol succinate (TOPROL-XL) 50 mg 24 hr tablet TAKE 1 TABLET BY MOUTH TWICE A  tablet 3    prasugrel (EFFIENT) tablet Take 1 tablet (5 mg total) by mouth daily (Patient taking differently: Take 5 mg by mouth daily Takes with pudding) 90 tablet 3    sacubitril-valsartan (Entresto) 24-26 MG TABS Take 1 tablet by mouth in the morning Bedtime 90 tablet 3    Sevelamer Carbonate 2.4 g PACK VIGOROUSLY MIX CONTENTS OF 1 PACKET IN WATER (IT WILL NOT DISSOLVE) AND DRINK 3 TIMES DAILY WITH MEALS       No current facility-administered medications for this visit.       SOCIAL HISTORY:  Social History     Socioeconomic History    Marital status: /Civil Union     Spouse name: None    Number of children: None    Years of education: None    Highest education level: Not asked   Occupational History    Occupation: disabled   Tobacco Use    Smoking status: Never    Smokeless tobacco: Never   Vaping Use    Vaping status: Never Used   Substance and Sexual Activity    Alcohol use: Never    Drug use: No    Sexual activity: Not Currently     Partners: Female     Comment: defer   Other Topics Concern    None   Social History Narrative    Daily caffeine consumption 2-3 servings a day     Social Determinants of Health     Financial Resource Strain:  Patient Declined (7/13/2023)    Overall Financial Resource Strain (CARDIA)     Difficulty of Paying Living Expenses: Patient declined   Food Insecurity: No Food Insecurity (1/19/2024)    Hunger Vital Sign     Worried About Running Out of Food in the Last Year: Never true     Ran Out of Food in the Last Year: Never true   Transportation Needs: No Transportation Needs (1/19/2024)    PRAPARE - Transportation     Lack of Transportation (Medical): No     Lack of Transportation (Non-Medical): No   Physical Activity: Not on file   Stress: No Stress Concern Present (1/18/2019)    [employer] [place] [employer] - Occupational Stress Questionnaire     Feeling of Stress : Only a little   Social Connections: Unknown (1/18/2019)    Social Connection and Isolation Panel [NHANES]     Frequency of Communication with Friends and Family: Not on file     Frequency of Social Gatherings with Friends and Family: Not on file     Attends Bahai Services: Not on file     Active Member of Clubs or Organizations: Not on file     Attends Club or Organization Meetings: Not on file     Marital Status:    Intimate Partner Violence: Not At Risk (1/18/2019)    Humiliation, Afraid, Rape, and Kick questionnaire     Fear of Current or Ex-Partner: No     Emotionally Abused: No     Physically Abused: No     Sexually Abused: No   Housing Stability: Low Risk  (1/19/2024)    Housing Stability Vital Sign     Unable to Pay for Housing in the Last Year: No     Number of Places Lived in the Last Year: 1     Unstable Housing in the Last Year: No        Review of Systems   Constitutional:  Negative for chills and fever.   Respiratory:  Negative for cough and shortness of breath.    Cardiovascular:  Negative for chest pain.   Gastrointestinal:  Negative for nausea and vomiting.   Skin:  Positive for wound.   Neurological:  Positive for numbness. Negative for weakness.         Objective:       Wound 06/02/23 Diabetic Ulcer Heel Left (Active)    Enter Oliveros score: Oliveros Grade 2: Deep ulcer extended to ligament, tendon, joint capsule, bone, or deep fascia without abscess or osteomyelitis (OM) 04/11/24 0840   Wound Image Images linked 04/11/24 0854   Wound Description Pink;Slough;Yellow;Granulation tissue 04/11/24 0840   Lani-wound Assessment Intact 04/11/24 0840   Wound Length (cm) 0.6 cm 04/11/24 0840   Wound Width (cm) 1.2 cm 04/11/24 0840   Wound Depth (cm) 0.7 cm 04/11/24 0840   Wound Surface Area (cm^2) 0.72 cm^2 04/11/24 0840   Wound Volume (cm^3) 0.504 cm^3 04/11/24 0840   Calculated Wound Volume (cm^3) 0.5 cm^3 04/11/24 0840   Number of underminings 1 04/11/24 0840   Undermining 1 0.4 04/11/24 0840   Undermining 1 is depth extending from 12 to 3 04/11/24 0840   Drainage Amount Small 04/11/24 0840   Drainage Description Serosanguineous;Milky;Yellow 04/11/24 0840   Non-staged Wound Description Full thickness 04/11/24 0840   Wound packed? Yes 04/11/24 0840   Packing- # removed 1 04/11/24 0840   Packing- # inserted 1 04/11/24 0840   Patient Tolerance Tolerated well 04/11/24 0840   Dressing Status Intact 04/11/24 0840       /72   Pulse 61   Temp (!) 96.6 °F (35.9 °C)   Resp 16     Physical Exam  Vitals and nursing note reviewed.   Constitutional:       General: He is not in acute distress.     Appearance: He is not toxic-appearing or diaphoretic.   Cardiovascular:      Rate and Rhythm: Normal rate and regular rhythm.      Pulses:           Dorsalis pedis pulses are 1+ on the left side.        Posterior tibial pulses are detected w/ Doppler on the left side.   Pulmonary:      Effort: Pulmonary effort is normal. No respiratory distress.   Musculoskeletal:         General: No signs of injury.      Right foot: No foot drop.      Left foot: No foot drop.   Feet:      Comments: Biphasic PTA left.  Skin:     General: Skin is warm.      Capillary Refill: Capillary refill takes less than 2 seconds.      Coloration: Skin is not cyanotic or mottled.       Findings: No abscess or erythema.      Nails: There is no clubbing.      Comments: Oliveros 2 DFU noted left heel.   in size.  Increased granular tissues.  No malodor.  Wound does not probe to bone.  No purulence or redness.  No signs of infection.  See wound assessment and photo.     Neurological:      General: No focal deficit present.      Mental Status: He is alert and oriented to person, place, and time.      Cranial Nerves: No cranial nerve deficit.      Sensory: No sensory deficit.      Motor: No weakness.      Coordination: Coordination normal.   Psychiatric:         Mood and Affect: Mood normal.         Behavior: Behavior normal.         Thought Content: Thought content normal.         Judgment: Judgment normal.           Wound Instructions:  Orders Placed This Encounter   Procedures    Wound cleansing and dressings Diabetic Ulcer Left Heel     Wound location: Left heel  Change dressing Daily  The dressing must stay dry and intact. You may shower with dressing protected by cast cover or bag. Or you may sponge bathe. Call wound center or home health nurse if dressing becomes wet.   Apply Zinc to mona wound.  Apply 1/4 strength dakins moistened gauze wet to dry dressing to wound bed.  Cover with ABD.  Secure with rolled gauze and tape.             Wound off loading:  Wound off loading Diabetic Ulcer Left Heel  Continue to wear Globoped shoe when out of bed.    When in bed, please keep Prevalon boot on at all times to offload heel.     Wound home care:  [facility]     Standing Status:   Future     Standing Expiration Date:   2024    Wound Procedure Treatment Diabetic Ulcer Left Heel     This order was created via procedure documentation    Debridement Diabetic Ulcer Left Heel     This order was created via procedure documentation        Diagnosis ICD-10-CM Associated Orders   1. Ulcer of left heel, with fat layer exposed ([facility])  L97.422 lidocaine (LMX) 4 % cream     Wound cleansing and dressings Diabetic  Ulcer Left Heel     Wound Procedure Treatment Diabetic Ulcer Left Heel     sodium hypochlorite (DAKIN'S HALF-STRENGTH) external solution     Debridement Diabetic Ulcer Left Heel      2. Type 2 diabetes mellitus with chronic kidney disease on chronic dialysis, without long-term current use of insulin ([facility])  E11.22 Debridement Diabetic Ulcer Left Heel    N18.6     Z99.2       3. Peripheral arterial disease ([facility])  I73.9

## 2024-04-11 NOTE — PROGRESS NOTES
Wound Procedure Treatment Diabetic Ulcer Left Heel    Performed by: Charlotte Conde RN  Authorized by: Franklin Church DPM  Associated wounds:   Wound 06/02/23 Diabetic Ulcer Heel Left  Wound cleansed with:  NSS  Applied to periwound:  Zinc oxide paste  Applied primary dressing:  Other  Applied secondary dressing:  Gauze  Wound secured with:  Javi and Compression wrap  Offloading device appllied:  Heel relief shoe    Wound lightly packed with 1/2 strength Dakins moistened gauze and covered with gauze.  Secured with javi and tape then ACE wrap.

## 2024-04-16 ENCOUNTER — HOME CARE VISIT (OUTPATIENT)
Dept: HOME HEALTH SERVICES | Facility: HOME HEALTHCARE | Age: 64
End: 2024-04-16
Payer: MEDICARE

## 2024-04-16 ENCOUNTER — OFFICE VISIT (OUTPATIENT)
Dept: ENDOCRINOLOGY | Facility: CLINIC | Age: 64
End: 2024-04-16
Payer: MEDICARE

## 2024-04-16 ENCOUNTER — OFFICE VISIT (OUTPATIENT)
Dept: INTERNAL MEDICINE CLINIC | Facility: CLINIC | Age: 64
End: 2024-04-16
Payer: MEDICARE

## 2024-04-16 VITALS
TEMPERATURE: 97.8 F | DIASTOLIC BLOOD PRESSURE: 62 MMHG | HEART RATE: 68 BPM | OXYGEN SATURATION: 95 % | RESPIRATION RATE: 18 BRPM | SYSTOLIC BLOOD PRESSURE: 112 MMHG

## 2024-04-16 VITALS
DIASTOLIC BLOOD PRESSURE: 84 MMHG | SYSTOLIC BLOOD PRESSURE: 128 MMHG | TEMPERATURE: 98 F | HEART RATE: 84 BPM | WEIGHT: 212 LBS | BODY MASS INDEX: 33.2 KG/M2 | OXYGEN SATURATION: 98 %

## 2024-04-16 VITALS
BODY MASS INDEX: 33.2 KG/M2 | OXYGEN SATURATION: 98 % | SYSTOLIC BLOOD PRESSURE: 126 MMHG | DIASTOLIC BLOOD PRESSURE: 82 MMHG | WEIGHT: 212 LBS | HEART RATE: 63 BPM

## 2024-04-16 DIAGNOSIS — N18.6 TYPE 2 DIABETES MELLITUS WITH CHRONIC KIDNEY DISEASE ON CHRONIC DIALYSIS, WITHOUT LONG-TERM CURRENT USE OF INSULIN (HCC): Primary | ICD-10-CM

## 2024-04-16 DIAGNOSIS — E11.22 TYPE 2 DIABETES MELLITUS WITH CHRONIC KIDNEY DISEASE ON CHRONIC DIALYSIS, WITHOUT LONG-TERM CURRENT USE OF INSULIN (HCC): Primary | ICD-10-CM

## 2024-04-16 DIAGNOSIS — I10 PRIMARY HYPERTENSION: ICD-10-CM

## 2024-04-16 DIAGNOSIS — E78.2 MIXED HYPERLIPIDEMIA: ICD-10-CM

## 2024-04-16 DIAGNOSIS — F39 MOOD DISORDER (HCC): Primary | ICD-10-CM

## 2024-04-16 DIAGNOSIS — Z99.2 TYPE 2 DIABETES MELLITUS WITH CHRONIC KIDNEY DISEASE ON CHRONIC DIALYSIS, WITHOUT LONG-TERM CURRENT USE OF INSULIN (HCC): Primary | ICD-10-CM

## 2024-04-16 PROCEDURE — 99213 OFFICE O/P EST LOW 20 MIN: CPT | Performed by: INTERNAL MEDICINE

## 2024-04-16 PROCEDURE — G2211 COMPLEX E/M VISIT ADD ON: HCPCS | Performed by: INTERNAL MEDICINE

## 2024-04-16 PROCEDURE — 99214 OFFICE O/P EST MOD 30 MIN: CPT | Performed by: PHYSICIAN ASSISTANT

## 2024-04-16 PROCEDURE — G0300 HHS/HOSPICE OF LPN EA 15 MIN: HCPCS

## 2024-04-16 NOTE — PROGRESS NOTES
Patient Progress Note      CC: DM      Referring Provider  Raj Auguste Do  [address] [facility]  [address]  [place] [address]     History of Present Illness:   Asad Galloway is a 64 y.o. male with a history of type 2 diabetes with long term use of insulin. Diabetes course has been stable. Complications of DM: ESRD on HD, TIA, neuropathy. He had a hospitalization in Jan 2024 for a non-healing heel ulcer. Denies recent severe hypoglycemic or severe hyperglycemic episodes. Denies any issues with his current regimen. Home glucose monitoring: are performed regularly, 1-2 times a day     No log or meter for review today  Readings range in 90s-low 100s mg/dl     Current regimen:  no treatment  Hypoglycemic episodes: No, rare  H/o of hypoglycemia causing hospitalization or Intervention such as glucagon injection  or ambulance call :  Yes  Hypoglycemia symptoms: jitteriness and sweating  Treatment of hypoglycemia: soda, juice. Has glucagon pen at home.     Medic alert tag: recommended: Yes     Diabetes education: Yes  Diet: 3 meals per day, 2 snacks per day. Timing of meals is predictable.   Diabetic diet compliance:  compliant most of the time  Activity: Daily activity is predictable: Yes. No routine exercise.     Ophthamology:  Nov 2022  Podiatry: March 2023     Has HTN: on ARB  Has hyperlipidemia: on statin - tolerating well, no myalgias. compliant most of the time, denies any side effects from medications.  Thyroid disorders: No  History of pancreatitis: No    Patient Active Problem List   Diagnosis    Type 2 diabetes mellitus with chronic kidney disease on chronic dialysis, without long-term current use of insulin (HCC)    Dizziness    Mixed hyperlipidemia    Antiplatelet or antithrombotic long-term use    Nephrotic range proteinuria    Elevated alkaline phosphatase level    GERD (gastroesophageal reflux disease)    Other constipation    Abnormal EEG    History of stroke    Anxiety associated with depression     Urge incontinence    S/P arteriovenous (AV) fistula creation    Pre-kidney transplant, listed    Anemia    History of 2019 novel coronavirus disease (COVID-19)    ESRD on dialysis (HCC)    Penetrating foot wound, left, initial encounter    Emotional lability    Primary hypertension    Generalized weakness    Chest pain    Electrolyte abnormality    CAD, multiple vessel    SOB (shortness of breath)    Left arm swelling    Ischemic cardiomyopathy    Moderate AS (aortic stenosis)    Mood disorder (HCC)    Diabetic polyneuropathy associated with type 2 diabetes mellitus (HCC)    Absence of toe of right foot (HCC)    Hammer toes of both feet    Elevated troponin    Presence of external cardiac defibrillator    Nonhealing ulcer of heel (HCC)    Rectal bleeding    Hypotension    Edema of left lower extremity    Acute on chronic systolic (congestive) heart failure (HCC)    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 (HCC)    Fall    Old myocardial infarction    Hypertensive heart and chronic kidney disease with heart failure and with stage 5 chronic kidney disease, or end stage renal disease (HCC)      Past Medical History:   Diagnosis Date    Cerebrovascular accident (CVA) due to thrombosis of left middle cerebral artery (HCC) 07/29/2018    Chronic kidney disease     Coronary artery disease     Diabetes mellitus (HCC)     not on meds    Dialysis patient (HCC)     M-W-F    Fistula     left upper arm for hemodialyis    GERD (gastroesophageal reflux disease)     History of coronary artery stent placement     x3    Hypercholesteremia     Hyperlipidemia     Hypertension     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    Limb alert care status     no BP/IV left arm    Neuropathy     Obesity     Osteomyelitis (HCC)     last assessed 11/4/16-per son not currently    PVC's (premature ventricular contractions)     sees [facility]    Stroke (HCC)     last weeof July 2018 [facility]     "TIA (transient ischemic attack) 10/28/2018    Wears dentures     Wears glasses       Past Surgical History:   Procedure Laterality Date    ABDOMINAL SURGERY      CARDIAC CATHETERIZATION N/A 05/02/2022    Procedure: Cardiac Coronary Angiogram;  Surgeon: Sam Davis MD;  [facility];  Service: Cardiology    CARDIAC CATHETERIZATION N/A 05/02/2022    Procedure: Cardiac pci;  Surgeon: Sam Davis MD;  [facility];  Service: Cardiology    CARDIAC CATHETERIZATION  02/01/2023    Procedure: Cardiac catheterization;  Surgeon: Sam Davis MD;  [facility];  Service: Cardiology    CARDIAC CATHETERIZATION N/A 02/01/2023    Procedure: Cardiac pci;  Surgeon: Sam Davis MD;  [facility];  Service: Cardiology    CARDIAC CATHETERIZATION N/A 02/01/2023    Procedure: Cardiac Coronary Angiogram;  Surgeon: Sam Davis MD;  [facility];  Service: Cardiology    CARDIAC CATHETERIZATION N/A 02/01/2023    Procedure: Cardiac other-IVUS;  Surgeon: Sam Davis MD;  [facility];  Service: Cardiology    CHOLECYSTECTOMY      Percutaneous    COLONOSCOPY      CYSTOSCOPY      HEMODIALYSIS ADULT  1/22/2024    HEMODIALYSIS ADULT  1/24/2024    IR LOWER EXTREMITY ANGIOGRAM  9/29/2023    IR LOWER EXTREMITY ANGIOGRAM  2/26/2024    OTHER SURGICAL HISTORY      \"stimulator to control bowel movements\"    CA ESOPHAGOGASTRODUODENOSCOPY TRANSORAL DIAGNOSTIC N/A 09/27/2016    Procedure: ESOPHAGOGASTRODUODENOSCOPY (EGD);  Surgeon: Adele Rowe MD;  Location: [facility];  Service: Gastroenterology    CA LAPAROSCOPY SURG CHOLECYSTECTOMY N/A 02/29/2016    Procedure: LAPAROSCOPIC CHOLECYSTECTOMY ;  Surgeon: Cliff Roman DO;  Location: [facility];  Service: General    CA SLCTV CATHJ 3RD+ ORD SLCTV ABDL PEL/LXTR BRNCH Left 9/29/2023    Procedure: Left leg angiogram with intervention;  Surgeon: Michelle Galvan MD;  Location: [facility];  Service: " "Vascular    NM SLCTV CATHJ 3RD+ ORD SLCTV ABDL PEL/LXTR BRNCH Left 2/26/2024    Procedure: Left leg angiogram antegrade access, left popliteal angioplasty;  Surgeon: Michelle Galvan MD;  Location: [facility];  Service: Vascular    ROTATOR CUFF REPAIR Right     SPINAL CORD STIMULATOR IMPLANT      \"Medtronic interstim model # 3058- in lower back to control bowel movements-currently turned off-battery is dead\"    TOE AMPUTATION Right 10/28/2016    Procedure: 3RD TOE AMPUTATION ;  Surgeon: Anjel Salas DPM;  Location[facility];  Service:       Family History   Problem Relation Age of Onset    Leukemia Mother     Liver disease Mother     Lung cancer Mother         heavy smoker - 3 ppd    Heart disease Father     Liver disease Father     Diabetes type I Father     Multiple myeloma Sister     Breast cancer Sister     Urolithiasis Family     Alcohol abuse Neg Hx     Depression Neg Hx     Drug abuse Neg Hx     Substance Abuse Neg Hx     Mental illness Neg Hx      Social History     Tobacco Use    Smoking status: Never    Smokeless tobacco: Never   Substance Use Topics    Alcohol use: Never     No Known Allergies      Current Outpatient Medications:     aspirin (ECOTRIN LOW STRENGTH) 81 mg EC tablet, Take 81 mg by mouth daily Resume on 8/14  , Disp: , Rfl:     atorvastatin (LIPITOR) 40 mg tablet, TAKE 1 TABLET BY MOUTH DAILY WITH DINNER, Disp: 90 tablet, Rfl: 1    Blood Glucose Monitoring Suppl (True Metrix Meter) w/Device KIT, Use to test blood sugars 3 times daily, Disp: 1 kit, Rfl: 0    Blood Pressure Monitoring (BLOOD PRESSURE CUFF) MISC, Use to check blood pressure before taking blood pressure medication and 1 hour after and follow instructions provided in discharge instructions based on the readings., Disp: 1 each, Rfl: 0    Cholecalciferol (Vitamin D3) 1.25 MG (94366 UT) CAPS, TAKE 1 CAPSULE BY MOUTH ONE TIME PER WEEK, Disp: 12 capsule, Rfl: 3    divalproex sodium (DEPAKOTE SPRINKLE) 125 MG capsule, TAKE " 2 CAPSULES (250 MG TOTAL) BY MOUTH EVERY 12 (TWELVE) HOURS, Disp: 360 capsule, Rfl: 1    glucose blood (True Metrix Blood Glucose Test) test strip, Use 1 each 3 (three) times a day Use as instructed, Disp: 300 strip, Rfl: 1    Lancets MISC, Use 3 (three) times a day Use 3 times daily, Disp: 300 each, Rfl: 0    metoprolol succinate (TOPROL-XL) 50 mg 24 hr tablet, TAKE 1 TABLET BY MOUTH TWICE A DAY, Disp: 180 tablet, Rfl: 3    prasugrel (EFFIENT) tablet, Take 1 tablet (5 mg total) by mouth daily (Patient taking differently: Take 5 mg by mouth daily Takes with pudding), Disp: 90 tablet, Rfl: 3    sacubitril-valsartan (Entresto) 24-26 MG TABS, Take 1 tablet by mouth in the morning Bedtime, Disp: 90 tablet, Rfl: 3    Sevelamer Carbonate 2.4 g PACK, VIGOROUSLY MIX CONTENTS OF 1 PACKET IN WATER (IT WILL NOT DISSOLVE) AND DRINK 3 TIMES DAILY WITH MEALS, Disp: , Rfl:     sodium hypochlorite (DAKIN'S HALF-STRENGTH) external solution, Apply moistened gauze with Dakin to the wound daily., Disp: 473 mL, Rfl: 1  Review of Systems   Constitutional:  Negative for activity change, appetite change, fatigue and unexpected weight change.   HENT:  Negative for trouble swallowing.    Eyes:  Negative for visual disturbance.   Respiratory:  Negative for shortness of breath.    Cardiovascular:  Negative for chest pain and palpitations.   Gastrointestinal:  Negative for constipation and diarrhea.   Endocrine: Negative for polydipsia and polyuria.   Musculoskeletal: Negative.    Skin:  Positive for wound (left foot; managed by podiatry).   Neurological:  Positive for numbness.   Psychiatric/Behavioral: Negative.         Physical Exam:  Body mass index is 33.2 kg/m².  /82 (BP Location: Left arm, Patient Position: Sitting, Cuff Size: Large)   Pulse 63   Wt 96.2 kg (212 lb)   SpO2 98%   BMI 33.20 kg/m²    Wt Readings from Last 3 Encounters:   04/16/24 96.2 kg (212 lb)   04/16/24 96.2 kg (212 lb)   03/20/24 96.2 kg (212 lb)  "      Physical Exam  Diabetic Foot Exam    Labs:   Lab Results   Component Value Date    HGBA1C 5.1 01/10/2024     Lab Results   Component Value Date    GLUCOSE 92 09/29/2023    CALCIUM 9.1 02/22/2024     08/10/2016    K 4.5 02/22/2024    CO2 33 (H) 02/22/2024    CL 94 (L) 02/22/2024    BUN 31 (H) 02/22/2024    CREATININE 5.25 (H) 02/22/2024     No results found for: \"MICROALBUR\", \"TEWW10COE\"  GFR, Calculated   Date Value Ref Range Status   09/08/2020 10 (L) >60 mL/min/1.73m2 Final     Comment:     mL/min per 1.73 square meters                                            Normal Function or Mild Renal    Disease (if clinically at risk):  >or=60  Moderately Decreased:                30-59  Severely Decreased:                  15-29  Renal Failure:                         <15                                            -American GFR: multiply reported GFR by 1.16    Please note that the eGFR is based on the CKD-EPI calculation, and is not intended to be used for drug dosing.                                            Note: Calculated GFR may not be an accurate indicator of renal function if the patient's renal function is not in a steady state.     eGFR   Date Value Ref Range Status   02/22/2024 10 ml/min/1.73sq m Final     No components found for: \"MALBCRER\"  Lab Results   Component Value Date    CHOL 203 (H) 08/10/2016    HDL 30 (L) 01/30/2023    TRIG 123 01/30/2023     Lab Results   Component Value Date    ALT 9 01/22/2024    AST 11 (L) 01/22/2024    ALKPHOS 92 01/22/2024    BILITOT 0.6 08/10/2016     Lab Results   Component Value Date    VRY1GRXLEBWS 1.966 01/29/2023         Plan:    Diagnoses and all orders for this visit:    Type 2 diabetes mellitus with chronic kidney disease on chronic dialysis, without long-term current use of insulin ([facility])  HGA1C 5.1%.   Treatment regimen: no treatment at this time. Continue with dietary modifications.  Episodes of hypoglycemia can lead to permanent disability and " death.  Discussed risks/complications associated with uncontrolled diabetes.    Advised to adhere to diabetic diet, and recommended staying active/exercising routinely as tolerated.  Keep carbohydrates consistent to limit blood glucose fluctuations.  Advised to call if blood sugars less than 70 mg/dl or over 300 mg/dl.   Check blood glucose 1-2 times a day  Discussed symptoms and treatment of hypoglycemia.   Recommended routine follow-up with podiatry and ophthalmology.   Ordered blood work to complete prior to next visit.  -     Hemoglobin A1C; Future  -     Fructosamine; Future    Mixed hyperlipidemia  LDL previously 30  On statin therapy  Managed by PCP    Primary hypertension  Blood pressure well-controlled, continue current treatment        Discussed with the patient diagnosis and treatment and all questions fully answered. He will call me if any problems arise.    Counseled patient on diagnostic results, prognosis, risk and benefit of treatment options, instruction for management, importance of treatment compliance, risk factor reduction and impressions.      Lissette Brennan PA-C

## 2024-04-16 NOTE — PROGRESS NOTES
Name: Asad Galloway      : 1960      MRN: [id number]  Encounter Provider: Raj Auguste DO  Encounter Date: 2024   Encounter department: [facility] INTERNAL MEDICINE    Assessment & Plan     1. Mood disorder (HCC)  Comments:  overall stable but is stressed as he is waiting for kidney transplant.  c/w depakote and refer for therapy  Orders:  -     Ambulatory referral to Psych Services; Future        Depression Screening and Follow-up Plan: Patient was screened for depression during today's encounter. They screened negative with a PHQ-9 score of 0.        Subjective      HPI    Todd is here for follow up, his wife is present during the appt and she provides some add'l history.  Todd is taking his medications and attending dialysis.  He is sad because he wants to have kidney transplant as soon as possible.  He has had issues that caused him to delay having kidney transplant including CAD treatment in past and a heel ulcer.  He denies harmful thoughts but would like to speak with someone on how he is feeling.  He takes depakote for his mood.  He is in process of being re-listed for transplant pending healing of his foot wound.  He is resistant to wearing the boot to help his foot to heal but it is healing.  2 people that go his dialysis center have gotten kidney transplants in recently per pt's wife.  ROS otherwise negative, no other complaints.    Review of Systems   Constitutional:  Negative for fever.   Eyes:  Negative for visual disturbance.   Musculoskeletal:  Positive for gait problem (using cane to ambulate with).   Allergic/Immunologic: Negative for immunocompromised state.   Psychiatric/Behavioral:  Negative for dysphoric mood (but sad, tearful as he is so desiring to have kidney transplant) and suicidal ideas.        Current Outpatient Medications on File Prior to Visit   Medication Sig    aspirin (ECOTRIN LOW STRENGTH) 81 mg EC tablet Take 81 mg by mouth daily Resume on        atorvastatin (LIPITOR) 40 mg tablet TAKE 1 TABLET BY MOUTH DAILY WITH DINNER    Blood Glucose Monitoring Suppl (True Metrix Meter) w/Device KIT Use to test blood sugars 3 times daily    Blood Pressure Monitoring (BLOOD PRESSURE CUFF) MISC Use to check blood pressure before taking blood pressure medication and 1 hour after and follow instructions provided in discharge instructions based on the readings.    Cholecalciferol (Vitamin D3) 1.25 MG (25090 UT) CAPS TAKE 1 CAPSULE BY MOUTH ONE TIME PER WEEK    divalproex sodium (DEPAKOTE SPRINKLE) 125 MG capsule TAKE 2 CAPSULES (250 MG TOTAL) BY MOUTH EVERY 12 (TWELVE) HOURS    glucose blood (True Metrix Blood Glucose Test) test strip Use 1 each 3 (three) times a day Use as instructed    Lancets MISC Use 3 (three) times a day Use 3 times daily    metoprolol succinate (TOPROL-XL) 50 mg 24 hr tablet TAKE 1 TABLET BY MOUTH TWICE A DAY    prasugrel (EFFIENT) tablet Take 1 tablet (5 mg total) by mouth daily (Patient taking differently: Take 5 mg by mouth daily Takes with pudding)    sacubitril-valsartan (Entresto) 24-26 MG TABS Take 1 tablet by mouth in the morning Bedtime    Sevelamer Carbonate 2.4 g PACK VIGOROUSLY MIX CONTENTS OF 1 PACKET IN WATER (IT WILL NOT DISSOLVE) AND DRINK 3 TIMES DAILY WITH MEALS    sodium hypochlorite (DAKIN'S HALF-STRENGTH) external solution Apply moistened gauze with Dakin to the wound daily.       Objective     /84 (BP Location: Right arm, Patient Position: Sitting, Cuff Size: Standard)   Pulse 84   Temp 98 °F (36.7 °C)   Wt 96.2 kg (212 lb)   SpO2 98%   BMI 33.20 kg/m²     Physical Exam  Vitals reviewed.   Constitutional:       General: He is not in acute distress.  HENT:      Head: Normocephalic and atraumatic.   Cardiovascular:      Rate and Rhythm: Normal rate and regular rhythm.      Heart sounds: No murmur heard.  Pulmonary:      Effort: Pulmonary effort is normal.      Breath sounds: No wheezing or rales.   Neurological:       Mental Status: He is alert. Mental status is at baseline.   Psychiatric:         Mood and Affect: Mood normal. Affect is tearful.         Behavior: Behavior normal.         Thought Content: Thought content does not include suicidal ideation.      Comments: See VU Anthony Rai, DO

## 2024-04-16 NOTE — CASE COMMUNICATION
Ship to Clinician     Ordering MD Franklin Church    Wound 1      Full      Frequency daily      Transpore white  2in [id number]     1

## 2024-04-16 NOTE — CASE COMMUNICATION
Ship to Pt. Home     Ordering MD Franklin Church    Wound 1      Full       ABD 5x9 [id number]   6    Transpore white  2in [id number]   1

## 2024-04-16 NOTE — PATIENT INSTRUCTIONS
Hypoglycemia instructions   Asad JACOB Galloway  4/16/2024  [id number]    Low Blood Sugar    Steps to treat low blood sugar.    1. Test blood sugar if you have symptoms of low blood sugar:   Low Blood Sugar Symptoms:  o Sweaty  o Dizzy  o Rapid heartbeat  o Shaky  o Bad mood  o Hungry      2. Treat blood sugar less than 70 with 15 grams of fast-acting carbohydrate:   Examples of 15 grams Fast-Acting Carbohydrate:  o 4 oz juice  o 4 oz regular soda  o 3-4 glucose tablets (chew)  o 3-4 hard candies (chew)          3.  Wait 15 minutes and test your blood sugar again     4. If blood sugar is less than 100, repeat steps 2-3.    5. When your blood sugar is 100 or more, eat a snack if it will be longer than one hour until your next meal. The snack should be 15 grams of carbohydrate and a protein:   Examples of snacks:  o ½ sandwich  o 6 crackers with cheese  o Piece of fruit with cheese or peanut butter  o 6 crackers with peanut butter

## 2024-04-17 DIAGNOSIS — E55.9 VITAMIN D DEFICIENCY: ICD-10-CM

## 2024-04-17 RX ORDER — CHOLECALCIFEROL (VITAMIN D3) 1250 MCG
CAPSULE ORAL
Qty: 12 CAPSULE | Refills: 4 | Status: SHIPPED | OUTPATIENT
Start: 2024-04-17

## 2024-04-18 ENCOUNTER — OFFICE VISIT (OUTPATIENT)
Dept: WOUND CARE | Facility: HOSPITAL | Age: 64
End: 2024-04-18
Payer: MEDICARE

## 2024-04-18 ENCOUNTER — HOME CARE VISIT (OUTPATIENT)
Dept: HOME HEALTH SERVICES | Facility: HOME HEALTHCARE | Age: 64
End: 2024-04-18
Payer: MEDICARE

## 2024-04-18 VITALS
RESPIRATION RATE: 16 BRPM | HEART RATE: 58 BPM | SYSTOLIC BLOOD PRESSURE: 117 MMHG | DIASTOLIC BLOOD PRESSURE: 55 MMHG | TEMPERATURE: 97 F

## 2024-04-18 DIAGNOSIS — Z99.2 TYPE 2 DIABETES MELLITUS WITH CHRONIC KIDNEY DISEASE ON CHRONIC DIALYSIS, WITHOUT LONG-TERM CURRENT USE OF INSULIN (HCC): ICD-10-CM

## 2024-04-18 DIAGNOSIS — L97.422 ULCER OF LEFT HEEL, WITH FAT LAYER EXPOSED (HCC): Primary | ICD-10-CM

## 2024-04-18 DIAGNOSIS — N18.6 TYPE 2 DIABETES MELLITUS WITH CHRONIC KIDNEY DISEASE ON CHRONIC DIALYSIS, WITHOUT LONG-TERM CURRENT USE OF INSULIN (HCC): ICD-10-CM

## 2024-04-18 DIAGNOSIS — E11.22 TYPE 2 DIABETES MELLITUS WITH CHRONIC KIDNEY DISEASE ON CHRONIC DIALYSIS, WITHOUT LONG-TERM CURRENT USE OF INSULIN (HCC): ICD-10-CM

## 2024-04-18 PROCEDURE — 11042 DBRDMT SUBQ TIS 1ST 20SQCM/<: CPT | Performed by: PODIATRIST

## 2024-04-18 RX ORDER — LIDOCAINE 40 MG/G
CREAM TOPICAL ONCE
Status: COMPLETED | OUTPATIENT
Start: 2024-04-18 | End: 2024-04-18

## 2024-04-18 RX ADMIN — LIDOCAINE: 40 CREAM TOPICAL at 08:35

## 2024-04-18 NOTE — PROGRESS NOTES
Wound Procedure Treatment Diabetic Ulcer Left Heel    Performed by: Gilma Nice RN  Authorized by: Franklin Church DPM  Associated wounds:   Wound 06/02/23 Diabetic Ulcer Heel Left  Wound cleansed with:  Dakin's 0.25%  Applied to periwound:  Zinc oxide paste  Applied secondary dressing:  Gauze and ABD  Wound secured with:  Kerlix and Tape    DAKINS WET TO DRY DRESSING APPLIED

## 2024-04-18 NOTE — PATIENT INSTRUCTIONS
Orders Placed This Encounter   Procedures    Wound cleansing and dressings Diabetic Ulcer Left Heel     Wound location: Left heel  Change dressing Daily  The dressing must stay dry and intact. You may shower with dressing protected by cast cover or bag. Or you may sponge bathe. Call wound center or home health nurse if dressing becomes wet.   Apply Zinc to mona wound.  Apply 1/4 strength dakins moistened gauze wet to dry dressing to wound bed.  Cover with ABD.  Secure with rolled gauze and tape.                        Continue to wear Globoped shoe when out of bed.    When in bed, please keep Prevalon boot on at all times to offload heel.     Wound home care:  [facility]     Standing Status:   Future     Standing Expiration Date:   4/25/2024    Wound Procedure Treatment Diabetic Ulcer Left Heel     This order was created via procedure documentation

## 2024-04-18 NOTE — PROGRESS NOTES
"Patient ID: Asad Galloway is a 64 y.o. male Date of Birth 1960     Chief Complaint  Chief Complaint   Patient presents with    Follow Up Wound Care Visit     Left heel wound       Allergies  Patient has no known allergies.    Assessment:    No problem-specific Assessment & Plan notes found for this encounter.       Diagnoses and all orders for this visit:    Ulcer of left heel, with fat layer exposed (HCC)  -     lidocaine (LMX) 4 % cream  -     Wound cleansing and dressings Diabetic Ulcer Left Heel; Future  -     Wound Procedure Treatment Diabetic Ulcer Left Heel  -     Debridement Diabetic Ulcer Left Heel    Type 2 diabetes mellitus with chronic kidney disease on chronic dialysis, without long-term current use of insulin (HCC)  -     Debridement Diabetic Ulcer Left Heel        Debridement   Wound 06/02/23 Diabetic Ulcer Heel Left    Universal Protocol:  Consent: Verbal consent obtained.  Risks and benefits: risks, benefits and alternatives were discussed  Consent given by: patient  Time out: Immediately prior to procedure a \"time out\" was called to verify the correct patient, procedure, equipment, support staff and site/side marked as required.  Timeout called at: 4/18/2024 8:38 AM.  Patient understanding: patient states understanding of the procedure being performed  Patient identity confirmed: verbally with patient    Debridement Details  Performed by: physician  Debridement type: surgical  Level of debridement: subcutaneous tissue  Pain control: lidocaine 4%      Post-debridement measurements  Length (cm): 1.2  Width (cm): 1.1  Depth (cm): 0.7  Percent debrided: 100%  Surface Area (cm^2): 1.32  Area Debrided (cm^2): 1.32  Volume (cm^3): 0.92    Tissue and other material debrided: dermis, epidermis and subcutaneous tissue  Devitalized tissue debrided: callus, fibrin and slough  Instrument(s) utilized: blade  Bleeding: small  Hemostasis obtained with: pressure  Procedural pain (0-10): 0  Post-procedural " pain: 0   Response to treatment: procedure was tolerated well        Plan:  Reviewed medical records.  Discussed his condition and prognosis.    Wound is stable.  Continue serial debridement and Dakin solution.  Discussed with him and his son that patient compliance would  be required to help with his condition.  High risk for limb loss due to the extent of the wound and comorbidities.     Globoped shoe for off-loading.  Prevalon boot at night.    RA in 1 week.        Wound 06/02/23 Diabetic Ulcer Heel Left (Active)   Enter Oliveros score: Oliveros Grade 2: Deep ulcer extended to ligament, tendon, joint capsule, bone, or deep fascia without abscess or osteomyelitis (OM) 04/18/24 0832   Wound Image Images linked 04/18/24 0844   Wound Description Pink;Slough;Yellow;Granulation tissue 04/18/24 0832   Lani-wound Assessment Intact 04/18/24 0832   Wound Length (cm) 1.1 cm 04/18/24 0832   Wound Width (cm) 1.1 cm 04/18/24 0832   Wound Depth (cm) 0.7 cm 04/18/24 0832   Wound Surface Area (cm^2) 1.21 cm^2 04/18/24 0832   Wound Volume (cm^3) 0.847 cm^3 04/18/24 0832   Calculated Wound Volume (cm^3) 0.85 cm^3 04/18/24 0832   Number of underminings 1 04/18/24 0832   Undermining 1 0.3 04/18/24 0832   Undermining 1 is depth extending from 12-3 04/18/24 0832   Drainage Amount Small 04/18/24 0832   Drainage Description Milky;Yellow 04/18/24 0832   Non-staged Wound Description Full thickness 04/18/24 0832   Dressing Status Intact 04/18/24 0832       Wound 06/02/23 Diabetic Ulcer Heel Left (Active)   Date First Assessed/Time First Assessed: 06/02/23 1908   Primary Wound Type: Diabetic Ulcer  Location: Heel  Wound Location Orientation: Left  Dressing Status: Clean;Dry;Intact       [REMOVED] Wound 01/29/23 Abrasion(s) Pretibial Right (Removed)   Resolved Date/Resolved Time: 12/21/23 0834  Date First Assessed/Time First Assessed: 01/29/23 1218   Traumatic Wound Type: Abrasion(s)  Location: Pretibial  Wound Location Orientation: Right  Wound  Description (Comments): Scabbed  Wound Outcome: Other...       [REMOVED] Wound 02/01/23 Wrist Anterior;Right (Removed)   Resolved Date: 12/26/23  Date First Assessed/Time First Assessed: 02/01/23 1806   Location: Wrist  Wound Location Orientation: Anterior;Right       [REMOVED] Wound 02/01/23 Skin Tear Abrasion(s) Arm Left;Posterior;Proximal (Removed)   Resolved Date: 12/26/23  Date First Assessed/Time First Assessed: 02/01/23 1930   Primary Wound Type: Skin Tear  Traumatic Wound Type: Abrasion(s)  Location: Arm  Wound Location Orientation: Left;Posterior;Proximal       [REMOVED] Wound 09/29/23 Groin Right (Removed)   Resolved Date: 12/26/23  Date First Assessed/Time First Assessed: 09/29/23 0957   Location: Groin  Wound Location Orientation: Right  Wound Description (Comments): New Puncture site noted   Incision's 1st Dressing: ADHESIVE SKIN HIGH VISCOSITY EXOFIN ...       [REMOVED] Wound 02/26/24 Groin Left (Removed)   Resolved Date: 03/05/24  Date First Assessed/Time First Assessed: 02/26/24 1410   Location: Groin  Wound Location Orientation: Left  Wound Description (Comments): LEFT GROIN ACCESS SITE, SOFT, DRY, NO HEMATOMA  Incision's 1st Dressing: ADHESIVE SKIN H...       Subjective:          HPI    The patient presents for evaluation of left heel ulcer.  Pain under control.  No new complaint.        The following portions of the patient's history were reviewed and updated as appropriate: allergies, current medications, past family history, past medical history, past social history, past surgical history, and problem list.      PAST MEDICAL HISTORY:  Past Medical History:   Diagnosis Date    Cerebrovascular accident (CVA) due to thrombosis of left middle cerebral artery (HCC) 07/29/2018    Chronic kidney disease     Coronary artery disease     Diabetes mellitus (HCC)     not on meds    Dialysis patient ([facility])     M-W-F    Fistula     left upper arm for hemodialyis    GERD (gastroesophageal reflux disease)      "History of coronary artery stent placement     x3    Hypercholesteremia     Hyperlipidemia     Hypertension     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    Limb alert care status     no BP/IV left arm    Neuropathy     Obesity     Osteomyelitis (HCC)     last assessed 11/4/16-per son not currently    PVC's (premature ventricular contractions)     sees [facility]    Stroke (HCC)     last weeof July 2018 [facility]    TIA (transient ischemic attack) 10/28/2018    Wears dentures     Wears glasses        PAST SURGICAL HISTORY:  Past Surgical History:   Procedure Laterality Date    ABDOMINAL SURGERY      CARDIAC CATHETERIZATION N/A 05/02/2022    Procedure: Cardiac Coronary Angiogram;  Surgeon: Sam Davis MD;  [facility];  Service: Cardiology    CARDIAC CATHETERIZATION N/A 05/02/2022    Procedure: Cardiac pci;  Surgeon: Sam Davis MD;  [facility];  Service: Cardiology    CARDIAC CATHETERIZATION  02/01/2023    Procedure: Cardiac catheterization;  Surgeon: Sam Davis MD;  [facility];  Service: Cardiology    CARDIAC CATHETERIZATION N/A 02/01/2023    Procedure: Cardiac pci;  Surgeon: Sam Davis MD;  [facility];  Service: Cardiology    CARDIAC CATHETERIZATION N/A 02/01/2023    Procedure: Cardiac Coronary Angiogram;  Surgeon: Sam Davis MD;  [facility];  Service: Cardiology    CARDIAC CATHETERIZATION N/A 02/01/2023    Procedure: Cardiac other-IVUS;  Surgeon: Sam Davis MD;  [facility];  Service: Cardiology    CHOLECYSTECTOMY      Percutaneous    COLONOSCOPY      CYSTOSCOPY      HEMODIALYSIS ADULT  1/22/2024    HEMODIALYSIS ADULT  1/24/2024    IR LOWER EXTREMITY ANGIOGRAM  9/29/2023    IR LOWER EXTREMITY ANGIOGRAM  2/26/2024    OTHER SURGICAL HISTORY      \"stimulator to control bowel movements\"    CA ESOPHAGOGASTRODUODENOSCOPY TRANSORAL DIAGNOSTIC N/A 09/27/2016    Procedure: " "ESOPHAGOGASTRODUODENOSCOPY (EGD);  Surgeon: Adele Rowe MD;  Location: [facility];  Service: Gastroenterology    FL LAPAROSCOPY SURG CHOLECYSTECTOMY N/A 02/29/2016    Procedure: LAPAROSCOPIC CHOLECYSTECTOMY ;  Surgeon: Cliff Roman DO;  Location: [facility];  Service: General    FL SLCTV CATHJ 3RD+ ORD SLCTV ABDL PEL/LXTR BRNCH Left 9/29/2023    Procedure: Left leg angiogram with intervention;  Surgeon: Michelle Galvan MD;  Location[facility];  Service: Vascular    FL SLCTV CATHJ 3RD+ ORD SLCTV ABDL PEL/LXTR BRNCH Left 2/26/2024    Procedure: Left leg angiogram antegrade access, left popliteal angioplasty;  Surgeon: Michelle Galvan MD;  [facility];  Service: Vascular    ROTATOR CUFF REPAIR Right     SPINAL CORD STIMULATOR IMPLANT      \"Medtronic interstim model # 3058- in lower back to control bowel movements-currently turned off-battery is dead\"    TOE AMPUTATION Right 10/28/2016    Procedure: 3RD TOE AMPUTATION ;  Surgeon: Anjel Salas DPM;  [facility];  Service:         ALLERGIES:  Patient has no known allergies.    MEDICATIONS:  Current Outpatient Medications   Medication Sig Dispense Refill    aspirin (ECOTRIN LOW STRENGTH) 81 mg EC tablet Take 81 mg by mouth daily Resume on 8/14        atorvastatin (LIPITOR) 40 mg tablet TAKE 1 TABLET BY MOUTH DAILY WITH DINNER 90 tablet 1    Blood Glucose Monitoring Suppl (True Metrix Meter) w/Device KIT Use to test blood sugars 3 times daily 1 kit 0    Blood Pressure Monitoring (BLOOD PRESSURE CUFF) MISC Use to check blood pressure before taking blood pressure medication and 1 hour after and follow instructions provided in discharge instructions based on the readings. 1 each 0    divalproex sodium (DEPAKOTE SPRINKLE) 125 MG capsule TAKE 2 CAPSULES (250 MG TOTAL) BY MOUTH EVERY 12 (TWELVE) HOURS 360 capsule 1    glucose blood (True Metrix Blood Glucose Test) test strip Use 1 each 3 (three) times a day Use as instructed 300 strip 1 "    Lancets MISC Use 3 (three) times a day Use 3 times daily 300 each 0    metoprolol succinate (TOPROL-XL) 50 mg 24 hr tablet TAKE 1 TABLET BY MOUTH TWICE A  tablet 3    prasugrel (EFFIENT) tablet Take 1 tablet (5 mg total) by mouth daily (Patient taking differently: Take 5 mg by mouth daily Takes with pudding) 90 tablet 3    sacubitril-valsartan (Entresto) 24-26 MG TABS Take 1 tablet by mouth in the morning Bedtime 90 tablet 3    Sevelamer Carbonate 2.4 g PACK VIGOROUSLY MIX CONTENTS OF 1 PACKET IN WATER (IT WILL NOT DISSOLVE) AND DRINK 3 TIMES DAILY WITH MEALS      sodium hypochlorite (DAKIN'S HALF-STRENGTH) external solution Apply moistened gauze with Dakin to the wound daily. 473 mL 1    Vitamin D3 1.25 MG (48709 UT) capsule TAKE 1 CAPSULE BY MOUTH ONE TIME PER WEEK 12 capsule 4     No current facility-administered medications for this visit.       SOCIAL HISTORY:  Social History     Socioeconomic History    Marital status: /Civil Union     Spouse name: Not on file    Number of children: Not on file    Years of education: Not on file    Highest education level: Not asked   Occupational History    Occupation: disabled   Tobacco Use    Smoking status: Never    Smokeless tobacco: Never   Vaping Use    Vaping status: Never Used   Substance and Sexual Activity    Alcohol use: Never    Drug use: No    Sexual activity: Not Currently     Partners: Female     Comment: defer   Other Topics Concern    Not on file   Social History Narrative    Daily caffeine consumption 2-3 servings a day     Social Determinants of Health     Financial Resource Strain: Patient Declined (7/13/2023)    Overall Financial Resource Strain (CARDIA)     Difficulty of Paying Living Expenses: Patient declined   Food Insecurity: No Food Insecurity (1/19/2024)    Hunger Vital Sign     Worried About Running Out of Food in the Last Year: Never true     Ran Out of Food in the Last Year: Never true   Transportation Needs: No Transportation  Needs (1/19/2024)    PRAPARE - Transportation     Lack of Transportation (Medical): No     Lack of Transportation (Non-Medical): No   Physical Activity: Not on file   Stress: No Stress Concern Present (1/18/2019)    [employer] - Occupational Stress Questionnaire     Feeling of Stress : Only a little   Social Connections: Unknown (1/18/2019)    Social Connection and Isolation Panel [NHANES]     Frequency of Communication with Friends and Family: Not on file     Frequency of Social Gatherings with Friends and Family: Not on file     Attends Oriental orthodox Services: Not on file     Active Member of Clubs or Organizations: Not on file     Attends Club or Organization Meetings: Not on file     Marital Status:    Intimate Partner Violence: Not At Risk (1/18/2019)    Humiliation, Afraid, Rape, and Kick questionnaire     Fear of Current or Ex-Partner: No     Emotionally Abused: No     Physically Abused: No     Sexually Abused: No   Housing Stability: Low Risk  (1/19/2024)    Housing Stability Vital Sign     Unable to Pay for Housing in the Last Year: No     Number of Places Lived in the Last Year: 1     Unstable Housing in the Last Year: No        Review of Systems   Constitutional:  Negative for chills and fever.   Respiratory:  Negative for cough and shortness of breath.    Cardiovascular:  Negative for chest pain.   Gastrointestinal:  Negative for nausea and vomiting.   Skin:  Positive for wound.   Neurological:  Positive for numbness. Negative for weakness.         Objective:       Wound 06/02/23 Diabetic Ulcer Heel Left (Active)   Enter Oliveros score: Oliveros Grade 2: Deep ulcer extended to ligament, tendon, joint capsule, bone, or deep fascia without abscess or osteomyelitis (OM) 04/18/24 0832   Wound Image Images linked 04/18/24 0844   Wound Description Pink;Slough;Yellow;Granulation tissue 04/18/24 0832   Lani-wound Assessment Intact 04/18/24 0832   Wound Length (cm) 1.1 cm 04/18/24  0832   Wound Width (cm) 1.1 cm 24 08   Wound Depth (cm) 0.7 cm 24 08   Wound Surface Area (cm^2) 1.21 cm^2 24 0832   Wound Volume (cm^3) 0.847 cm^3 24 0832   Calculated Wound Volume (cm^3) 0.85 cm^3 24 0832   Number of underminings 1 24 08   Undermining 1 0.3 24 08   Undermining 1 is depth extending from 12-3 24 08   Drainage Amount Small 24   Drainage Description Milky;Yellow 24   Non-staged Wound Description Full thickness 24   Dressing Status Intact 24 08       /55   Pulse 58   Temp (!) 97 °F (36.1 °C)   Resp 16     Physical Exam  Vitals and nursing note reviewed.   Constitutional:       General: He is not in acute distress.     Appearance: He is not toxic-appearing or diaphoretic.   Cardiovascular:      Rate and Rhythm: Normal rate and regular rhythm.      Pulses:           Dorsalis pedis pulses are 1+ on the left side.        Posterior tibial pulses are detected w/ Doppler on the left side.   Pulmonary:      Effort: Pulmonary effort is normal. No respiratory distress.   Musculoskeletal:         General: No signs of injury.      Right foot: No foot drop.      Left foot: No foot drop.   Feet:      Comments: Biphasic PTA left.  Skin:     General: Skin is warm.      Capillary Refill: Capillary refill takes less than 2 seconds.      Coloration: Skin is not cyanotic or mottled.      Findings: No abscess or erythema.      Nails: There is no clubbing.      Comments: Oliveros 2 DFU noted left heel.   in depth.  Increased granular tissues.  No malodor.  Wound does not probe to bone.  No purulence or redness.  No signs of infection.  No exposed periosteum.  See wound assessment and photo.     Neurological:      General: No focal deficit present.      Mental Status: He is alert and oriented to person, place, and time.      Cranial Nerves: No cranial nerve deficit.      Sensory: No sensory deficit.      Motor: No  weakness.      Coordination: Coordination normal.   Psychiatric:         Mood and Affect: Mood normal.         Behavior: Behavior normal.         Thought Content: Thought content normal.         Judgment: Judgment normal.           Wound Instructions:  Orders Placed This Encounter   Procedures    Wound cleansing and dressings Diabetic Ulcer Left Heel     Wound location: Left heel  Change dressing Daily  The dressing must stay dry and intact. You may shower with dressing protected by cast cover or bag. Or you may sponge bathe. Call wound center or home health nurse if dressing becomes wet.   Apply Zinc to mona wound.  Apply 1/4 strength dakins moistened gauze wet to dry dressing to wound bed.  Cover with ABD.  Secure with rolled gauze and tape.                        Continue to wear Globoped shoe when out of bed.    When in bed, please keep Prevalon boot on at all times to offload heel.     Wound home care:  [facility]     Standing Status:   Future     Standing Expiration Date:   4/25/2024    Wound Procedure Treatment Diabetic Ulcer Left Heel     This order was created via procedure documentation    Debridement Diabetic Ulcer Left Heel     This order was created via procedure documentation        Diagnosis ICD-10-CM Associated Orders   1. Ulcer of left heel, with fat layer exposed ([facility])  L97.422 lidocaine (LMX) 4 % cream     Wound cleansing and dressings Diabetic Ulcer Left Heel     Wound Procedure Treatment Diabetic Ulcer Left Heel     Debridement Diabetic Ulcer Left Heel      2. Type 2 diabetes mellitus with chronic kidney disease on chronic dialysis, without long-term current use of insulin ([facility])  E11.22 Debridement Diabetic Ulcer Left Heel    N18.6     Z99.2

## 2024-04-23 ENCOUNTER — HOME CARE VISIT (OUTPATIENT)
Dept: HOME HEALTH SERVICES | Facility: HOME HEALTHCARE | Age: 64
End: 2024-04-23
Payer: MEDICARE

## 2024-04-23 PROCEDURE — G0299 HHS/HOSPICE OF RN EA 15 MIN: HCPCS

## 2024-04-24 VITALS — DIASTOLIC BLOOD PRESSURE: 62 MMHG | TEMPERATURE: 97 F | RESPIRATION RATE: 16 BRPM | SYSTOLIC BLOOD PRESSURE: 110 MMHG

## 2024-04-25 ENCOUNTER — HOME CARE VISIT (OUTPATIENT)
Dept: HOME HEALTH SERVICES | Facility: HOME HEALTHCARE | Age: 64
End: 2024-04-25
Payer: MEDICARE

## 2024-04-25 ENCOUNTER — OFFICE VISIT (OUTPATIENT)
Dept: WOUND CARE | Facility: HOSPITAL | Age: 64
End: 2024-04-25
Payer: MEDICARE

## 2024-04-25 VITALS
SYSTOLIC BLOOD PRESSURE: 121 MMHG | RESPIRATION RATE: 16 BRPM | HEART RATE: 57 BPM | TEMPERATURE: 96.9 F | DIASTOLIC BLOOD PRESSURE: 63 MMHG

## 2024-04-25 DIAGNOSIS — Z99.2 TYPE 2 DIABETES MELLITUS WITH CHRONIC KIDNEY DISEASE ON CHRONIC DIALYSIS, WITHOUT LONG-TERM CURRENT USE OF INSULIN (HCC): ICD-10-CM

## 2024-04-25 DIAGNOSIS — L97.422 ULCER OF LEFT HEEL, WITH FAT LAYER EXPOSED (HCC): Primary | ICD-10-CM

## 2024-04-25 DIAGNOSIS — N18.6 TYPE 2 DIABETES MELLITUS WITH CHRONIC KIDNEY DISEASE ON CHRONIC DIALYSIS, WITHOUT LONG-TERM CURRENT USE OF INSULIN (HCC): ICD-10-CM

## 2024-04-25 DIAGNOSIS — E11.22 TYPE 2 DIABETES MELLITUS WITH CHRONIC KIDNEY DISEASE ON CHRONIC DIALYSIS, WITHOUT LONG-TERM CURRENT USE OF INSULIN (HCC): ICD-10-CM

## 2024-04-25 PROCEDURE — 11042 DBRDMT SUBQ TIS 1ST 20SQCM/<: CPT | Performed by: PODIATRIST

## 2024-04-25 RX ORDER — LIDOCAINE 40 MG/G
CREAM TOPICAL ONCE
Status: COMPLETED | OUTPATIENT
Start: 2024-04-25 | End: 2024-04-25

## 2024-04-25 RX ADMIN — LIDOCAINE: 40 CREAM TOPICAL at 08:54

## 2024-04-25 NOTE — PATIENT INSTRUCTIONS
Orders Placed This Encounter   Procedures    Wound cleansing and dressings Diabetic Ulcer Left Heel     Wound location: Left heel  Change dressing Daily  The dressing must stay dry and intact. You may shower with dressing protected by cast cover or bag. Or you may sponge bathe. Call wound center or home health nurse if dressing becomes wet.   Apply Zinc to mona wound.  Apply 1/4 strength dakins moistened gauze wet to dry dressing to wound bed.  Cover with ABD.  Secure with rolled gauze and tape.                        Continue to wear Globoped shoe when out of bed.    When in bed, please keep Prevalon boot on at all times to offload heel.     Wound home care:  [facility]     Standing Status:   Future     Standing Expiration Date:   5/2/2024    Debridement     This order was created via procedure documentation    Wound Procedure Treatment Diabetic Ulcer Left Heel     This order was created via procedure documentation

## 2024-04-25 NOTE — PROGRESS NOTES
Wound Procedure Treatment Diabetic Ulcer Left Heel    Performed by: Gilma Nice RN  Authorized by: Franklin Church DPM  Associated wounds:   Wound 06/02/23 Diabetic Ulcer Heel Left  Wound cleansed with:  NSS and Dakin's 0.25%  Applied to periwound:  Zinc oxide paste  Applied secondary dressing:  Gauze and ABD  Offloading device appllied:  Heel relief shoe

## 2024-04-25 NOTE — PROGRESS NOTES
"Patient ID: Asad Galloway is a 64 y.o. male Date of Birth 1960     Chief Complaint  Chief Complaint   Patient presents with    Follow Up Wound Care Visit     Left foot wound       Allergies  Patient has no known allergies.    Assessment:    No problem-specific Assessment & Plan notes found for this encounter.       Diagnoses and all orders for this visit:    Ulcer of left heel, with fat layer exposed (HCC)  -     Wound cleansing and dressings Diabetic Ulcer Left Heel; Future  -     lidocaine (LMX) 4 % cream  -     Debridement Diabetic Ulcer Left Heel  -     Wound Procedure Treatment Diabetic Ulcer Left Heel    Type 2 diabetes mellitus with chronic kidney disease on chronic dialysis, without long-term current use of insulin (HCC)  -     Debridement Diabetic Ulcer Left Heel        Debridement   Wound 06/02/23 Diabetic Ulcer Heel Left    Universal Protocol:  Consent: Verbal consent obtained.  Risks and benefits: risks, benefits and alternatives were discussed  Consent given by: patient  Time out: Immediately prior to procedure a \"time out\" was called to verify the correct patient, procedure, equipment, support staff and site/side marked as required.  Timeout called at: 4/25/2024 9:05 AM.  Patient understanding: patient states understanding of the procedure being performed  Patient identity confirmed: verbally with patient    Debridement Details  Performed by: physician  Debridement type: surgical  Level of debridement: subcutaneous tissue  Pain control: lidocaine 4%      Post-debridement measurements  Length (cm): 1.2  Width (cm): 0.8  Depth (cm): 0.7  Percent debrided: 100%  Surface Area (cm^2): 0.96  Area Debrided (cm^2): 0.96  Volume (cm^3): 0.67    Tissue and other material debrided: dermis, epidermis and subcutaneous tissue  Devitalized tissue debrided: fibrin and slough  Instrument(s) utilized: blade  Bleeding: small  Hemostasis obtained with: pressure  Procedural pain (0-10): 0  Post-procedural pain: 0 "   Response to treatment: procedure was tolerated well        Plan:  Reviewed medical records.  Discussed his condition and prognosis.    Wound is stable and improving.  Continue serial debridement and Dakin solution. Stressed on patient compliance would  be required to help with his condition.  High risk for limb loss due to the extent of the wound and comorbidities.     Globoped shoe for off-loading.  Prevalon boot at night.    RA in 1 week.        Wound 06/02/23 Diabetic Ulcer Heel Left (Active)   Enter Oliveros score: Oliveros Grade 2: Deep ulcer extended to ligament, tendon, joint capsule, bone, or deep fascia without abscess or osteomyelitis (OM) 04/25/24 0849   Wound Image Images linked 04/25/24 0849   Wound Description Pink;Slough;Yellow;Granulation tissue 04/25/24 0849   Lani-wound Assessment Intact 04/25/24 0849   Wound Length (cm) 1.2 cm 04/25/24 0849   Wound Width (cm) 0.7 cm 04/25/24 0849   Wound Depth (cm) 0.7 cm 04/25/24 0849   Wound Surface Area (cm^2) 0.84 cm^2 04/25/24 0849   Wound Volume (cm^3) 0.588 cm^3 04/25/24 0849   Calculated Wound Volume (cm^3) 0.59 cm^3 04/25/24 0849   Drainage Amount Small 04/25/24 0849   Drainage Description Serous 04/25/24 0849   Non-staged Wound Description Full thickness 04/25/24 0849   Packing- # removed 1 04/25/24 0849   Dressing Status Intact 04/25/24 0849       Wound 06/02/23 Diabetic Ulcer Heel Left (Active)   Date First Assessed/Time First Assessed: 06/02/23 1908   Primary Wound Type: Diabetic Ulcer  Location: Heel  Wound Location Orientation: Left  Dressing Status: Clean;Dry;Intact       [REMOVED] Wound 01/29/23 Abrasion(s) Pretibial Right (Removed)   Resolved Date/Resolved Time: 12/21/23 0834  Date First Assessed/Time First Assessed: 01/29/23 1218   Traumatic Wound Type: Abrasion(s)  Location: Pretibial  Wound Location Orientation: Right  Wound Description (Comments): Scabbed  Wound Outcome: Other...       [REMOVED] Wound 02/01/23 Wrist Anterior;Right (Removed)    Resolved Date: 12/26/23  Date First Assessed/Time First Assessed: 02/01/23 1806   Location: Wrist  Wound Location Orientation: Anterior;Right       [REMOVED] Wound 02/01/23 Skin Tear Abrasion(s) Arm Left;Posterior;Proximal (Removed)   Resolved Date: 12/26/23  Date First Assessed/Time First Assessed: 02/01/23 1930   Primary Wound Type: Skin Tear  Traumatic Wound Type: Abrasion(s)  Location: Arm  Wound Location Orientation: Left;Posterior;Proximal       [REMOVED] Wound 09/29/23 Groin Right (Removed)   Resolved Date: 12/26/23  Date First Assessed/Time First Assessed: 09/29/23 0957   Location: Groin  Wound Location Orientation: Right  Wound Description (Comments): New Puncture site noted   Incision's 1st Dressing: ADHESIVE SKIN HIGH VISCOSITY EXOFIN ...       [REMOVED] Wound 02/26/24 Groin Left (Removed)   Resolved Date: 03/05/24  Date First Assessed/Time First Assessed: 02/26/24 1410   Location: Groin  Wound Location Orientation: Left  Wound Description (Comments): LEFT GROIN ACCESS SITE, SOFT, DRY, NO HEMATOMA  Incision's 1st Dressing: ADHESIVE SKIN H...       Subjective:          HPI    The patient presents for evaluation of left heel ulcer.  Pain under control.  No new complaint.  BS under control.      The following portions of the patient's history were reviewed and updated as appropriate: allergies, current medications, past family history, past medical history, past social history, past surgical history, and problem list.      PAST MEDICAL HISTORY:  Past Medical History:   Diagnosis Date    Cerebrovascular accident (CVA) due to thrombosis of left middle cerebral artery (HCC) 07/29/2018    Chronic kidney disease     Coronary artery disease     Diabetes mellitus (HCC)     not on meds    Dialysis patient ([facility])     M-W-F    Fistula     left upper arm for hemodialyis    GERD (gastroesophageal reflux disease)     History of coronary artery stent placement     x3    Hypercholesteremia     Hyperlipidemia      "Hypertension     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    Limb alert care status     no BP/IV left arm    Neuropathy     Obesity     Osteomyelitis (HCC)     last assessed 11/4/16-per son not currently    PVC's (premature ventricular contractions)     sees [facility]    Stroke (HCC)     last weeof July 2018 [facility]    TIA (transient ischemic attack) 10/28/2018    Wears dentures     Wears glasses        PAST SURGICAL HISTORY:  Past Surgical History:   Procedure Laterality Date    ABDOMINAL SURGERY      CARDIAC CATHETERIZATION N/A 05/02/2022    Procedure: Cardiac Coronary Angiogram;  Surgeon: Sam Davis MD;  [facility];  Service: Cardiology    CARDIAC CATHETERIZATION N/A 05/02/2022    Procedure: Cardiac pci;  Surgeon: Sam Davis MD;  [facility];  Service: Cardiology    CARDIAC CATHETERIZATION  02/01/2023    Procedure: Cardiac catheterization;  Surgeon: Sam Davis MD;  [facility];  Service: Cardiology    CARDIAC CATHETERIZATION N/A 02/01/2023    Procedure: Cardiac pci;  Surgeon: Sam Davis MD;  [facility];  Service: Cardiology    CARDIAC CATHETERIZATION N/A 02/01/2023    Procedure: Cardiac Coronary Angiogram;  Surgeon: Sam Davis MD;  [facility];  Service: Cardiology    CARDIAC CATHETERIZATION N/A 02/01/2023    Procedure: Cardiac other-IVUS;  Surgeon: Sam Davis MD;  [facility];  Service: Cardiology    CHOLECYSTECTOMY      Percutaneous    COLONOSCOPY      CYSTOSCOPY      HEMODIALYSIS ADULT  1/22/2024    HEMODIALYSIS ADULT  1/24/2024    IR LOWER EXTREMITY ANGIOGRAM  9/29/2023    IR LOWER EXTREMITY ANGIOGRAM  2/26/2024    OTHER SURGICAL HISTORY      \"stimulator to control bowel movements\"    [place] ESOPHAGOGASTRODUODENOSCOPY TRANSORAL DIAGNOSTIC N/A 09/27/2016    Procedure: ESOPHAGOGASTRODUODENOSCOPY (EGD);  Surgeon: Adele Rowe MD;  Location: [facility] GI [facility];  Service: " "Gastroenterology    [place] LAPAROSCOPY SURG CHOLECYSTECTOMY N/A 02/29/2016    Procedure: LAPAROSCOPIC CHOLECYSTECTOMY ;  Surgeon: Cliff Roman DO;  Location: [facility];  Service: General    [place] SLCTV CATHJ 3RD+ ORD SLCTV ABDL PEL/LXTR BRNCH Left 9/29/2023    Procedure: Left leg angiogram with intervention;  Surgeon: Michelle Galvan MD;  Location: [facility];  Service: Vascular    [place] SLCTV CATHJ 3RD+ ORD SLCTV ABDL PEL/LXTR BRNCH Left 2/26/2024    Procedure: Left leg angiogram antegrade access, left popliteal angioplasty;  Surgeon: Michelle Galvan MD;  Location: [facility];  Service: Vascular    ROTATOR CUFF REPAIR Right     SPINAL CORD STIMULATOR IMPLANT      \"Medtronic interstim model # 3058- in lower back to control bowel movements-currently turned off-battery is dead\"    TOE AMPUTATION Right 10/28/2016    Procedure: 3RD TOE AMPUTATION ;  Surgeon: Anjel Salas DPM;  Location[facility];  Service:         ALLERGIES:  Patient has no known allergies.    MEDICATIONS:  Current Outpatient Medications   Medication Sig Dispense Refill    aspirin (ECOTRIN LOW STRENGTH) 81 mg EC tablet Take 81 mg by mouth daily Resume on 8/14        atorvastatin (LIPITOR) 40 mg tablet TAKE 1 TABLET BY MOUTH DAILY WITH DINNER 90 tablet 1    Blood Glucose Monitoring Suppl (True Metrix Meter) w/Device KIT Use to test blood sugars 3 times daily 1 kit 0    Blood Pressure Monitoring (BLOOD PRESSURE CUFF) MISC Use to check blood pressure before taking blood pressure medication and 1 hour after and follow instructions provided in discharge instructions based on the readings. 1 each 0    divalproex sodium (DEPAKOTE SPRINKLE) 125 MG capsule TAKE 2 CAPSULES (250 MG TOTAL) BY MOUTH EVERY 12 (TWELVE) HOURS 360 capsule 1    glucose blood (True Metrix Blood Glucose Test) test strip Use 1 each 3 (three) times a day Use as instructed 300 strip 1    Lancets MISC Use 3 (three) times a day Use 3 times daily 300 each 0    metoprolol succinate " (TOPROL-XL) 50 mg 24 hr tablet TAKE 1 TABLET BY MOUTH TWICE A  tablet 3    prasugrel (EFFIENT) tablet Take 1 tablet (5 mg total) by mouth daily (Patient taking differently: Take 5 mg by mouth daily Takes with pudding) 90 tablet 3    sacubitril-valsartan (Entresto) 24-26 MG TABS Take 1 tablet by mouth in the morning Bedtime 90 tablet 3    Sevelamer Carbonate 2.4 g PACK VIGOROUSLY MIX CONTENTS OF 1 PACKET IN WATER (IT WILL NOT DISSOLVE) AND DRINK 3 TIMES DAILY WITH MEALS      sodium hypochlorite (DAKIN'S HALF-STRENGTH) external solution Apply moistened gauze with Dakin to the wound daily. 473 mL 1    Vitamin D3 1.25 MG (85896 UT) capsule TAKE 1 CAPSULE BY MOUTH ONE TIME PER WEEK 12 capsule 4     No current facility-administered medications for this visit.       SOCIAL HISTORY:  Social History     Socioeconomic History    Marital status: /Civil Union     Spouse name: Not on file    Number of children: Not on file    Years of education: Not on file    Highest education level: Not asked   Occupational History    Occupation: disabled   Tobacco Use    Smoking status: Never    Smokeless tobacco: Never   Vaping Use    Vaping status: Never Used   Substance and Sexual Activity    Alcohol use: Never    Drug use: No    Sexual activity: Not Currently     Partners: Female     Comment: defer   Other Topics Concern    Not on file   Social History Narrative    Daily caffeine consumption 2-3 servings a day     Social Determinants of Health     Financial Resource Strain: Patient Declined (7/13/2023)    Overall Financial Resource Strain (CARDIA)     Difficulty of Paying Living Expenses: Patient declined   Food Insecurity: No Food Insecurity (1/19/2024)    Hunger Vital Sign     Worried About Running Out of Food in the Last Year: Never true     Ran Out of Food in the Last Year: Never true   Transportation Needs: No Transportation Needs (1/19/2024)    PRAPARE - Transportation     Lack of Transportation (Medical): No     Lack  of Transportation (Non-Medical): No   Physical Activity: Not on file   Stress: No Stress Concern Present (1/18/2019)    [employer] - Occupational Stress Questionnaire     Feeling of Stress : Only a little   Social Connections: Unknown (1/18/2019)    Social Connection and Isolation Panel [NHANES]     Frequency of Communication with Friends and Family: Not on file     Frequency of Social Gatherings with Friends and Family: Not on file     Attends Hoahaoism Services: Not on file     Active Member of Clubs or Organizations: Not on file     Attends Club or Organization Meetings: Not on file     Marital Status:    Intimate Partner Violence: Not At Risk (1/18/2019)    Humiliation, Afraid, Rape, and Kick questionnaire     Fear of Current or Ex-Partner: No     Emotionally Abused: No     Physically Abused: No     Sexually Abused: No   Housing Stability: Low Risk  (1/19/2024)    Housing Stability Vital Sign     Unable to Pay for Housing in the Last Year: No     Number of Places Lived in the Last Year: 1     Unstable Housing in the Last Year: No        Review of Systems   Constitutional:  Negative for chills and fever.   Respiratory:  Negative for cough and shortness of breath.    Cardiovascular:  Negative for chest pain.   Gastrointestinal:  Negative for nausea and vomiting.   Skin:  Positive for wound.   Neurological:  Positive for numbness. Negative for weakness.         Objective:       Wound 06/02/23 Diabetic Ulcer Heel Left (Active)   Enter Oliveros score: Oliveros Grade 2: Deep ulcer extended to ligament, tendon, joint capsule, bone, or deep fascia without abscess or osteomyelitis (OM) 04/25/24 0849   Wound Image Images linked 04/25/24 0849   Wound Description Pink;Slough;Yellow;Granulation tissue 04/25/24 0849   Lani-wound Assessment Intact 04/25/24 0849   Wound Length (cm) 1.2 cm 04/25/24 0849   Wound Width (cm) 0.7 cm 04/25/24 0849   Wound Depth (cm) 0.7 cm 04/25/24 0849   Wound  Surface Area (cm^2) 0.84 cm^2 24 0849   Wound Volume (cm^3) 0.588 cm^3 24 0849   Calculated Wound Volume (cm^3) 0.59 cm^3 24 08   Drainage Amount Small 24   Drainage Description Serous 24 08   Non-staged Wound Description Full thickness 24   Packing- # removed 1 24   Dressing Status Intact 24       /63   Pulse 57   Temp (!) 96.9 °F (36.1 °C)   Resp 16     Physical Exam  Vitals and nursing note reviewed.   Constitutional:       General: He is not in acute distress.     Appearance: He is not toxic-appearing or diaphoretic.   Cardiovascular:      Rate and Rhythm: Normal rate and regular rhythm.      Pulses:           Dorsalis pedis pulses are 1+ on the left side.        Posterior tibial pulses are detected w/ Doppler on the left side.   Pulmonary:      Effort: Pulmonary effort is normal. No respiratory distress.   Musculoskeletal:         General: No signs of injury.      Right foot: No foot drop.      Left foot: No foot drop.   Feet:      Comments: Biphasic PTA left.  Skin:     General: Skin is warm.      Capillary Refill: Capillary refill takes less than 2 seconds.      Coloration: Skin is not cyanotic or mottled.      Findings: No abscess or erythema.      Nails: There is no clubbing.      Comments: Oliveors 2 DFU noted left heel.   in depth and size.  Increased granular tissues.  No malodor.  Wound does not probe to bone.  No purulence or redness.  No signs of infection.  No exposed periosteum.  See wound assessment and photo.     Neurological:      General: No focal deficit present.      Mental Status: He is alert and oriented to person, place, and time.      Cranial Nerves: No cranial nerve deficit.      Sensory: No sensory deficit.      Motor: No weakness.      Coordination: Coordination normal.   Psychiatric:         Mood and Affect: Mood normal.         Behavior: Behavior normal.         Thought Content: Thought content  normal.         Judgment: Judgment normal.           Wound Instructions:  Orders Placed This Encounter   Procedures    Wound cleansing and dressings Diabetic Ulcer Left Heel     Wound location: Left heel  Change dressing Daily  The dressing must stay dry and intact. You may shower with dressing protected by cast cover or bag. Or you may sponge bathe. Call wound center or home health nurse if dressing becomes wet.   Apply Zinc to mona wound.  Apply 1/4 strength dakins moistened gauze wet to dry dressing to wound bed.  Cover with ABD.  Secure with rolled gauze and tape.                        Continue to wear Globoped shoe when out of bed.    When in bed, please keep Prevalon boot on at all times to offload heel.     Wound home care:  [facility]     Standing Status:   Future     Standing Expiration Date:   5/2/2024    Debridement Diabetic Ulcer Left Heel     This order was created via procedure documentation    Wound Procedure Treatment Diabetic Ulcer Left Heel     This order was created via procedure documentation        Diagnosis ICD-10-CM Associated Orders   1. Ulcer of left heel, with fat layer exposed ([facility])  L97.422 Wound cleansing and dressings Diabetic Ulcer Left Heel     lidocaine (LMX) 4 % cream     Debridement Diabetic Ulcer Left Heel     Wound Procedure Treatment Diabetic Ulcer Left Heel      2. Type 2 diabetes mellitus with chronic kidney disease on chronic dialysis, without long-term current use of insulin ([facility])  E11.22 Debridement Diabetic Ulcer Left Heel    N18.6     Z99.2

## 2024-04-30 ENCOUNTER — HOME CARE VISIT (OUTPATIENT)
Dept: HOME HEALTH SERVICES | Facility: HOME HEALTHCARE | Age: 64
End: 2024-04-30
Payer: MEDICARE

## 2024-04-30 VITALS
SYSTOLIC BLOOD PRESSURE: 110 MMHG | HEART RATE: 57 BPM | DIASTOLIC BLOOD PRESSURE: 74 MMHG | OXYGEN SATURATION: 95 % | RESPIRATION RATE: 16 BRPM | TEMPERATURE: 97.4 F

## 2024-04-30 PROCEDURE — G0299 HHS/HOSPICE OF RN EA 15 MIN: HCPCS

## 2024-05-01 ENCOUNTER — TELEPHONE (OUTPATIENT)
Dept: PSYCHIATRY | Facility: CLINIC | Age: 64
End: 2024-05-01

## 2024-05-01 NOTE — TELEPHONE ENCOUNTER
Contacted patient in regards to Routine Referral in attempts to verify patient's needs of services and add patient to proper wait list. spoke with patient whom stated does not need services at this time, and will reach out when is needed.   Referral closed.

## 2024-05-02 ENCOUNTER — TELEPHONE (OUTPATIENT)
Dept: OTHER | Facility: OTHER | Age: 64
End: 2024-05-02

## 2024-05-02 ENCOUNTER — OFFICE VISIT (OUTPATIENT)
Dept: WOUND CARE | Facility: HOSPITAL | Age: 64
End: 2024-05-02
Payer: MEDICARE

## 2024-05-02 ENCOUNTER — HOME CARE VISIT (OUTPATIENT)
Dept: HOME HEALTH SERVICES | Facility: HOME HEALTHCARE | Age: 64
End: 2024-05-02
Payer: MEDICARE

## 2024-05-02 VITALS
RESPIRATION RATE: 16 BRPM | SYSTOLIC BLOOD PRESSURE: 140 MMHG | HEART RATE: 60 BPM | DIASTOLIC BLOOD PRESSURE: 59 MMHG | TEMPERATURE: 97.2 F

## 2024-05-02 DIAGNOSIS — Z99.2 TYPE 2 DIABETES MELLITUS WITH CHRONIC KIDNEY DISEASE ON CHRONIC DIALYSIS, WITHOUT LONG-TERM CURRENT USE OF INSULIN (HCC): ICD-10-CM

## 2024-05-02 DIAGNOSIS — E11.22 TYPE 2 DIABETES MELLITUS WITH CHRONIC KIDNEY DISEASE ON CHRONIC DIALYSIS, WITHOUT LONG-TERM CURRENT USE OF INSULIN (HCC): ICD-10-CM

## 2024-05-02 DIAGNOSIS — I73.9 PERIPHERAL ARTERIAL DISEASE (HCC): ICD-10-CM

## 2024-05-02 DIAGNOSIS — L97.422 ULCER OF LEFT HEEL, WITH FAT LAYER EXPOSED (HCC): Primary | ICD-10-CM

## 2024-05-02 DIAGNOSIS — N18.6 TYPE 2 DIABETES MELLITUS WITH CHRONIC KIDNEY DISEASE ON CHRONIC DIALYSIS, WITHOUT LONG-TERM CURRENT USE OF INSULIN (HCC): ICD-10-CM

## 2024-05-02 PROCEDURE — 11042 DBRDMT SUBQ TIS 1ST 20SQCM/<: CPT | Performed by: PODIATRIST

## 2024-05-02 RX ORDER — LIDOCAINE 40 MG/G
CREAM TOPICAL ONCE
Status: COMPLETED | OUTPATIENT
Start: 2024-05-02 | End: 2024-05-02

## 2024-05-02 RX ADMIN — LIDOCAINE 1 APPLICATION: 40 CREAM TOPICAL at 08:23

## 2024-05-02 NOTE — TELEPHONE ENCOUNTER
Asad's son, Sam is calling looking for a recommendation from Dr. Tan on what allergy medication Asad can take.   He states that patient has stents in his heart and is currently on dialysis so he does not want to give him any allergy medication until it is approved.     Please advise and return call to patient.   Thank you!

## 2024-05-02 NOTE — PATIENT INSTRUCTIONS
Orders Placed This Encounter   Procedures    Wound cleansing and dressings Diabetic Ulcer Left Heel     Wound location: Left heel  Change dressing Daily  The dressing must stay dry and intact. You may shower with dressing protected by cast cover or bag. Or you may sponge bathe. Call wound center or home health nurse if dressing becomes wet.   Apply Zinc to mona wound.  Apply 1/4 strength dakins moistened gauze wet to dry dressing to wound bed.  Cover with ABD.  Secure with rolled gauze and tape.                       Continue to wear Globoped shoe when out of bed.    When in bed, please keep Prevalon boot on at all times to offload heel.     Wound home care:  [facility]     Standing Status:   Future     Standing Expiration Date:   5/9/2024

## 2024-05-02 NOTE — PROGRESS NOTES
Wound Procedure Treatment Diabetic Ulcer Left Heel    Performed by: Charlotte Conde RN  Authorized by: Franklin Church DPM  Associated wounds:   Wound 06/02/23 Diabetic Ulcer Heel Left  Wound cleansed with:  NSS  Applied to periwound:  Zinc oxide paste  Applied primary dressing:  Packing  Applied secondary dressing:  ABD  Wound secured with:  Kerlix and Tape  Offloading device appllied:  Heel relief shoe    1/2 strength dakins moist gauze to wound bed.

## 2024-05-02 NOTE — PROGRESS NOTES
"Patient ID: Asad Galloway is a 64 y.o. male Date of Birth 1960     Chief Complaint  Chief Complaint   Patient presents with    Follow Up Wound Care Visit     Left foot wound       Allergies  Patient has no known allergies.    Assessment:    No problem-specific Assessment & Plan notes found for this encounter.       Diagnoses and all orders for this visit:    Ulcer of left heel, with fat layer exposed (HCC)  -     lidocaine (LMX) 4 % cream  -     Wound cleansing and dressings Diabetic Ulcer Left Heel; Future  -     Wound Procedure Treatment Diabetic Ulcer Left Heel    Type 2 diabetes mellitus with chronic kidney disease on chronic dialysis, without long-term current use of insulin (HCC)    Peripheral arterial disease (HCC)    Other orders  -     Debridement        Debridement   Wound 06/02/23 Diabetic Ulcer Heel Left    Universal Protocol:  Consent: Verbal consent obtained.  Risks and benefits: risks, benefits and alternatives were discussed  Consent given by: patient  Time out: Immediately prior to procedure a \"time out\" was called to verify the correct patient, procedure, equipment, support staff and site/side marked as required.  Timeout called at: 5/2/2024 8:37 AM.  Patient understanding: patient states understanding of the procedure being performed  Patient identity confirmed: verbally with patient    Debridement Details  Performed by: physician  Debridement type: surgical  Level of debridement: subcutaneous tissue      Post-debridement measurements  Length (cm): 0.6  Width (cm): 0.9  Depth (cm): 0.7  Percent debrided: 100%  Surface Area (cm^2): 0.54  Area Debrided (cm^2): 0.54  Volume (cm^3): 0.38    Tissue and other material debrided: dermis, epidermis and subcutaneous tissue  Devitalized tissue debrided: callus, fibrin and slough  Instrument(s) utilized: blade and curette  Bleeding: small  Hemostasis obtained with: pressure  Procedural pain (0-10): 0  Post-procedural pain: 0   Response to treatment: " procedure was tolerated well        Plan:  Reviewed medical records.  Discussed his condition and prognosis.    Wound is stable and improving.  Continue serial debridement and Dakin solution. Stressed on patient compliance would  be required to help with his condition.  High risk for limb loss due to the extent of the wound and comorbidities.   Discussed increased protein diet.    Globoped shoe for off-loading.  Prevalon boot at night.    RA in 1 week.        Wound 06/02/23 Diabetic Ulcer Heel Left (Active)   Enter Oliveros score: Oliveros Grade 2: Deep ulcer extended to ligament, tendon, joint capsule, bone, or deep fascia without abscess or osteomyelitis (OM) 05/02/24 0819   Wound Image Images linked 05/02/24 0832   Wound Description Slough;Yellow;Pink 05/02/24 0819   Lani-wound Assessment Intact;Dry 05/02/24 0819   Wound Length (cm) 0.5 cm 05/02/24 0819   Wound Width (cm) 0.9 cm 05/02/24 0819   Wound Depth (cm) 0.7 cm 05/02/24 0819   Wound Surface Area (cm^2) 0.45 cm^2 05/02/24 0819   Wound Volume (cm^3) 0.315 cm^3 05/02/24 0819   Calculated Wound Volume (cm^3) 0.32 cm^3 05/02/24 0819   Drainage Amount Scant 05/02/24 0819   Drainage Description Serous;Yellow 05/02/24 0819   Non-staged Wound Description Full thickness 05/02/24 0819   Wound packed? Yes 05/02/24 0819   Packing- # removed 1 05/02/24 0819   Packing- # inserted 1 05/02/24 0819   Patient Tolerance Tolerated well 05/02/24 0819   Dressing Status Intact 05/02/24 0819       Wound 06/02/23 Diabetic Ulcer Heel Left (Active)   Date First Assessed/Time First Assessed: 06/02/23 1908   Primary Wound Type: Diabetic Ulcer  Location: Heel  Wound Location Orientation: Left  Dressing Status: Clean;Dry;Intact       [REMOVED] Wound 01/29/23 Abrasion(s) Pretibial Right (Removed)   Resolved Date/Resolved Time: 12/21/23 0834  Date First Assessed/Time First Assessed: 01/29/23 1218   Traumatic Wound Type: Abrasion(s)  Location: Pretibial  Wound Location Orientation: Right  Wound  Description (Comments): Scabbed  Wound Outcome: Other...       [REMOVED] Wound 02/01/23 Wrist Anterior;Right (Removed)   Resolved Date: 12/26/23  Date First Assessed/Time First Assessed: 02/01/23 1806   Location: Wrist  Wound Location Orientation: Anterior;Right       [REMOVED] Wound 02/01/23 Skin Tear Abrasion(s) Arm Left;Posterior;Proximal (Removed)   Resolved Date: 12/26/23  Date First Assessed/Time First Assessed: 02/01/23 1930   Primary Wound Type: Skin Tear  Traumatic Wound Type: Abrasion(s)  Location: Arm  Wound Location Orientation: Left;Posterior;Proximal       [REMOVED] Wound 09/29/23 Groin Right (Removed)   Resolved Date: 12/26/23  Date First Assessed/Time First Assessed: 09/29/23 0957   Location: Groin  Wound Location Orientation: Right  Wound Description (Comments): New Puncture site noted   Incision's 1st Dressing: ADHESIVE SKIN HIGH VISCOSITY EXOFIN ...       [REMOVED] Wound 02/26/24 Groin Left (Removed)   Resolved Date: 03/05/24  Date First Assessed/Time First Assessed: 02/26/24 1410   Location: Groin  Wound Location Orientation: Left  Wound Description (Comments): LEFT GROIN ACCESS SITE, SOFT, DRY, NO HEMATOMA  Incision's 1st Dressing: ADHESIVE SKIN H...       Subjective:          HPI    The patient presents for evaluation of left heel ulcer.  Pain under control.  No new complaint.        The following portions of the patient's history were reviewed and updated as appropriate: allergies, current medications, past family history, past medical history, past social history, past surgical history, and problem list.      PAST MEDICAL HISTORY:  Past Medical History:   Diagnosis Date    Cerebrovascular accident (CVA) due to thrombosis of left middle cerebral artery (HCC) 07/29/2018    Chronic kidney disease     Coronary artery disease     Diabetes mellitus (HCC)     not on meds    Dialysis patient ([facility])     M-W-F    Fistula     left upper arm for hemodialyis    GERD (gastroesophageal reflux disease)      "History of coronary artery stent placement     x3    Hypercholesteremia     Hyperlipidemia     Hypertension     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    Limb alert care status     no BP/IV left arm    Neuropathy     Obesity     Osteomyelitis (HCC)     last assessed 11/4/16-per son not currently    PVC's (premature ventricular contractions)     sees [facility]    Stroke (HCC)     last weeof July 2018 [facility]    TIA (transient ischemic attack) 10/28/2018    Wears dentures     Wears glasses        PAST SURGICAL HISTORY:  Past Surgical History:   Procedure Laterality Date    ABDOMINAL SURGERY      CARDIAC CATHETERIZATION N/A 05/02/2022    Procedure: Cardiac Coronary Angiogram;  Surgeon: Sam Davis MD;  [facility];  Service: Cardiology    CARDIAC CATHETERIZATION N/A 05/02/2022    Procedure: Cardiac pci;  Surgeon: Sam Davis MD;  [facility];  Service: Cardiology    CARDIAC CATHETERIZATION  02/01/2023    Procedure: Cardiac catheterization;  Surgeon: Sam Davis MD;  [facility];  Service: Cardiology    CARDIAC CATHETERIZATION N/A 02/01/2023    Procedure: Cardiac pci;  Surgeon: Sam Davis MD;  [facility];  Service: Cardiology    CARDIAC CATHETERIZATION N/A 02/01/2023    Procedure: Cardiac Coronary Angiogram;  Surgeon: Sam Davis MD;  [facility];  Service: Cardiology    CARDIAC CATHETERIZATION N/A 02/01/2023    Procedure: Cardiac other-IVUS;  Surgeon: Sam Davis MD;  [facility];  Service: Cardiology    CHOLECYSTECTOMY      Percutaneous    COLONOSCOPY      CYSTOSCOPY      HEMODIALYSIS ADULT  1/22/2024    HEMODIALYSIS ADULT  1/24/2024    IR LOWER EXTREMITY ANGIOGRAM  9/29/2023    IR LOWER EXTREMITY ANGIOGRAM  2/26/2024    OTHER SURGICAL HISTORY      \"stimulator to control bowel movements\"    [place] ESOPHAGOGASTRODUODENOSCOPY TRANSORAL DIAGNOSTIC N/A 09/27/2016    Procedure: " "ESOPHAGOGASTRODUODENOSCOPY (EGD);  Surgeon: Adele Rowe MD;  Location: [facility];  Service: Gastroenterology    [place] LAPAROSCOPY SURG CHOLECYSTECTOMY N/A 02/29/2016    Procedure: LAPAROSCOPIC CHOLECYSTECTOMY ;  Surgeon: Cliff Roman DO;  Location: [facility];  Service: General    [place] SLCTV CATHJ 3RD+ ORD SLCTV ABDL PEL/LXTR BRNCH Left 9/29/2023    Procedure: Left leg angiogram with intervention;  Surgeon: Michelle Galvan MD;  Location: [facility];  Service: Vascular    [place] SLCTV CATHJ 3RD+ ORD SLCTV ABDL PEL/LXTR BRNCH Left 2/26/2024    Procedure: Left leg angiogram antegrade access, left popliteal angioplasty;  Surgeon: Michelle Galvan MD;  Location[facility];  Service: Vascular    ROTATOR CUFF REPAIR Right     SPINAL CORD STIMULATOR IMPLANT      \"Medtronic interstim model # 3058- in lower back to control bowel movements-currently turned off-battery is dead\"    TOE AMPUTATION Right 10/28/2016    Procedure: 3RD TOE AMPUTATION ;  Surgeon: Anjel Salas DPM;  Location[facility];  Service:         ALLERGIES:  Patient has no known allergies.    MEDICATIONS:  Current Outpatient Medications   Medication Sig Dispense Refill    aspirin (ECOTRIN LOW STRENGTH) 81 mg EC tablet Take 81 mg by mouth daily Resume on 8/14        atorvastatin (LIPITOR) 40 mg tablet TAKE 1 TABLET BY MOUTH DAILY WITH DINNER 90 tablet 1    Blood Glucose Monitoring Suppl (True Metrix Meter) w/Device KIT Use to test blood sugars 3 times daily 1 kit 0    Blood Pressure Monitoring (BLOOD PRESSURE CUFF) MISC Use to check blood pressure before taking blood pressure medication and 1 hour after and follow instructions provided in discharge instructions based on the readings. 1 each 0    divalproex sodium (DEPAKOTE SPRINKLE) 125 MG capsule TAKE 2 CAPSULES (250 MG TOTAL) BY MOUTH EVERY 12 (TWELVE) HOURS 360 capsule 1    glucose blood (True Metrix Blood Glucose Test) test strip Use 1 each 3 (three) times a day Use as instructed 300 strip 1 "    Lancets MISC Use 3 (three) times a day Use 3 times daily 300 each 0    metoprolol succinate (TOPROL-XL) 50 mg 24 hr tablet TAKE 1 TABLET BY MOUTH TWICE A  tablet 3    prasugrel (EFFIENT) tablet Take 1 tablet (5 mg total) by mouth daily (Patient taking differently: Take 5 mg by mouth daily Takes with pudding) 90 tablet 3    sacubitril-valsartan (Entresto) 24-26 MG TABS Take 1 tablet by mouth in the morning Bedtime 90 tablet 3    Sevelamer Carbonate 2.4 g PACK VIGOROUSLY MIX CONTENTS OF 1 PACKET IN WATER (IT WILL NOT DISSOLVE) AND DRINK 3 TIMES DAILY WITH MEALS      sodium hypochlorite (DAKIN'S HALF-STRENGTH) external solution Apply moistened gauze with Dakin to the wound daily. 473 mL 1    Vitamin D3 1.25 MG (60068 UT) capsule TAKE 1 CAPSULE BY MOUTH ONE TIME PER WEEK 12 capsule 4     No current facility-administered medications for this visit.       SOCIAL HISTORY:  Social History     Socioeconomic History    Marital status: /Civil Union     Spouse name: None    Number of children: None    Years of education: None    Highest education level: Not asked   Occupational History    Occupation: disabled   Tobacco Use    Smoking status: Never    Smokeless tobacco: Never   Vaping Use    Vaping status: Never Used   Substance and Sexual Activity    Alcohol use: Never    Drug use: No    Sexual activity: Not Currently     Partners: Female     Comment: defer   Other Topics Concern    None   Social History Narrative    Daily caffeine consumption 2-3 servings a day     Social Determinants of Health     Financial Resource Strain: Patient Declined (7/13/2023)    Overall Financial Resource Strain (CARDIA)     Difficulty of Paying Living Expenses: Patient declined   Food Insecurity: No Food Insecurity (1/19/2024)    Hunger Vital Sign     Worried About Running Out of Food in the Last Year: Never true     Ran Out of Food in the Last Year: Never true   Transportation Needs: No Transportation Needs (1/19/2024)    AMILCAR  - Transportation     Lack of Transportation (Medical): No     Lack of Transportation (Non-Medical): No   Physical Activity: Not on file   Stress: No Stress Concern Present (1/18/2019)    [employer] [place] [employer] - Occupational Stress Questionnaire     Feeling of Stress : Only a little   Social Connections: Unknown (1/18/2019)    Social Connection and Isolation Panel [NHANES]     Frequency of Communication with Friends and Family: Not on file     Frequency of Social Gatherings with Friends and Family: Not on file     Attends Faith Services: Not on file     Active Member of Clubs or Organizations: Not on file     Attends Club or Organization Meetings: Not on file     Marital Status:    Intimate Partner Violence: Not At Risk (1/18/2019)    Humiliation, Afraid, Rape, and Kick questionnaire     Fear of Current or Ex-Partner: No     Emotionally Abused: No     Physically Abused: No     Sexually Abused: No   Housing Stability: Low Risk  (1/19/2024)    Housing Stability Vital Sign     Unable to Pay for Housing in the Last Year: No     Number of Places Lived in the Last Year: 1     Unstable Housing in the Last Year: No        Review of Systems   Constitutional:  Negative for chills and fever.   Respiratory:  Negative for cough and shortness of breath.    Cardiovascular:  Negative for chest pain.   Gastrointestinal:  Negative for nausea and vomiting.   Skin:  Positive for wound.   Neurological:  Positive for numbness. Negative for weakness.         Objective:       Wound 06/02/23 Diabetic Ulcer Heel Left (Active)   Enter Oliveros score: Oliveros Grade 2: Deep ulcer extended to ligament, tendon, joint capsule, bone, or deep fascia without abscess or osteomyelitis (OM) 05/02/24 0819   Wound Image Images linked 05/02/24 0832   Wound Description Slough;Yellow;Pink 05/02/24 0819   Lani-wound Assessment Intact;Dry 05/02/24 0819   Wound Length (cm) 0.5 cm 05/02/24 0819   Wound Width (cm) 0.9 cm 05/02/24 0819    Wound Depth (cm) 0.7 cm 24   Wound Surface Area (cm^2) 0.45 cm^2 24   Wound Volume (cm^3) 0.315 cm^3 24   Calculated Wound Volume (cm^3) 0.32 cm^3 24   Drainage Amount Scant 24   Drainage Description Serous;Yellow 24   Non-staged Wound Description Full thickness 24   Wound packed? Yes 24   Packing- # removed 1 24   Packing- # inserted 1 24   Patient Tolerance Tolerated well 24   Dressing Status Intact 24       /59   Pulse 60   Temp (!) 97.2 °F (36.2 °C)   Resp 16     Physical Exam  Vitals and nursing note reviewed.   Constitutional:       General: He is not in acute distress.     Appearance: He is not toxic-appearing or diaphoretic.   Cardiovascular:      Rate and Rhythm: Normal rate and regular rhythm.      Pulses:           Dorsalis pedis pulses are 1+ on the left side.        Posterior tibial pulses are detected w/ Doppler on the left side.   Pulmonary:      Effort: Pulmonary effort is normal. No respiratory distress.   Musculoskeletal:         General: No signs of injury.      Right foot: No foot drop.      Left foot: No foot drop.   Feet:      Comments: Biphasic PTA left.  Skin:     General: Skin is warm.      Capillary Refill: Capillary refill takes less than 2 seconds.      Coloration: Skin is not cyanotic or mottled.      Findings: No abscess or erythema.      Nails: There is no clubbing.      Comments: Oliveros 2 DFU noted left heel.   in depth and size.  Mild periwound callus.  Start to see epithelium at the margin of the wound.  Increased granular tissues.  No malodor.  Wound does not probe to bone.  No purulence or redness.  No signs of infection.  No exposed periosteum.  See wound assessment and photo.     Neurological:      General: No focal deficit present.      Mental Status: He is alert and oriented to person, place, and time.      Cranial Nerves: No  cranial nerve deficit.      Sensory: No sensory deficit.      Motor: No weakness.      Coordination: Coordination normal.   Psychiatric:         Mood and Affect: Mood normal.         Behavior: Behavior normal.         Thought Content: Thought content normal.         Judgment: Judgment normal.           Wound Instructions:  Orders Placed This Encounter   Procedures    Wound cleansing and dressings Diabetic Ulcer Left Heel     Wound location: Left heel  Change dressing Daily  The dressing must stay dry and intact. You may shower with dressing protected by cast cover or bag. Or you may sponge bathe. Call wound center or home health nurse if dressing becomes wet.   Apply Zinc to mona wound.  Apply 1/4 strength dakins moistened gauze wet to dry dressing to wound bed.  Cover with ABD.  Secure with rolled gauze and tape.                       Continue to wear Globoped shoe when out of bed.    When in bed, please keep Prevalon boot on at all times to offload heel.     Wound home care:  [facility]     Standing Status:   Future     Standing Expiration Date:   5/9/2024    Wound Procedure Treatment Diabetic Ulcer Left Heel     This order was created via procedure documentation    Debridement     This order was created via procedure documentation        Diagnosis ICD-10-CM Associated Orders   1. Ulcer of left heel, with fat layer exposed ([facility])  L97.422 lidocaine (LMX) 4 % cream     Wound cleansing and dressings Diabetic Ulcer Left Heel     Wound Procedure Treatment Diabetic Ulcer Left Heel      2. Type 2 diabetes mellitus with chronic kidney disease on chronic dialysis, without long-term current use of insulin ([facility])  E11.22     N18.6     Z99.2       3. Peripheral arterial disease ([facility])  I73.9

## 2024-05-03 DIAGNOSIS — I25.5 ISCHEMIC CARDIOMYOPATHY: ICD-10-CM

## 2024-05-03 DIAGNOSIS — Z95.5 STATUS POST INSERTION OF DRUG ELUTING CORONARY ARTERY STENT: ICD-10-CM

## 2024-05-03 RX ORDER — PRASUGREL 5 MG/1
5 TABLET, FILM COATED ORAL DAILY
Qty: 90 TABLET | Refills: 1 | Status: SHIPPED | OUTPATIENT
Start: 2024-05-03

## 2024-05-07 ENCOUNTER — HOME CARE VISIT (OUTPATIENT)
Dept: HOME HEALTH SERVICES | Facility: HOME HEALTHCARE | Age: 64
End: 2024-05-07
Payer: MEDICARE

## 2024-05-07 ENCOUNTER — TELEPHONE (OUTPATIENT)
Age: 64
End: 2024-05-07

## 2024-05-07 PROCEDURE — G0299 HHS/HOSPICE OF RN EA 15 MIN: HCPCS

## 2024-05-07 NOTE — TELEPHONE ENCOUNTER
Spoke with Sam and relayed message as given per Dr. Tan. Sam inquired about cough medicines but stated he will ask the pharmacist for recommendations. Asked that Sam call if he has any other questions or concerns.     Sam is aware and understood.

## 2024-05-07 NOTE — TELEPHONE ENCOUNTER
Patient's son Sam calls.  Patient is having pollen allergy, and son is asking what allergy medication would be safe for him to take OTC.    Please advise.

## 2024-05-08 ENCOUNTER — TELEPHONE (OUTPATIENT)
Dept: FAMILY MEDICINE CLINIC | Facility: CLINIC | Age: 64
End: 2024-05-08

## 2024-05-08 VITALS
SYSTOLIC BLOOD PRESSURE: 120 MMHG | HEART RATE: 61 BPM | DIASTOLIC BLOOD PRESSURE: 62 MMHG | RESPIRATION RATE: 16 BRPM | TEMPERATURE: 97.1 F | OXYGEN SATURATION: 97 %

## 2024-05-08 NOTE — TELEPHONE ENCOUNTER
Called patient to let them know we have a 3pm appointment if they wanted to come in earlier than 4pm with Sae the therapist

## 2024-05-09 PROCEDURE — G0179 MD RECERTIFICATION HHA PT: HCPCS | Performed by: INTERNAL MEDICINE

## 2024-05-12 PROCEDURE — 400014 VN F/U

## 2024-05-14 ENCOUNTER — APPOINTMENT (OUTPATIENT)
Dept: RADIOLOGY | Facility: CLINIC | Age: 64
End: 2024-05-14
Payer: MEDICARE

## 2024-05-14 ENCOUNTER — OFFICE VISIT (OUTPATIENT)
Dept: URGENT CARE | Facility: CLINIC | Age: 64
End: 2024-05-14
Payer: MEDICARE

## 2024-05-14 ENCOUNTER — HOME CARE VISIT (OUTPATIENT)
Dept: HOME HEALTH SERVICES | Facility: HOME HEALTHCARE | Age: 64
End: 2024-05-14
Payer: MEDICARE

## 2024-05-14 ENCOUNTER — TELEPHONE (OUTPATIENT)
Age: 64
End: 2024-05-14

## 2024-05-14 VITALS
OXYGEN SATURATION: 98 % | DIASTOLIC BLOOD PRESSURE: 66 MMHG | HEART RATE: 58 BPM | SYSTOLIC BLOOD PRESSURE: 110 MMHG | RESPIRATION RATE: 16 BRPM | TEMPERATURE: 97.6 F

## 2024-05-14 VITALS
HEART RATE: 60 BPM | WEIGHT: 208 LBS | RESPIRATION RATE: 18 BRPM | DIASTOLIC BLOOD PRESSURE: 49 MMHG | BODY MASS INDEX: 30.81 KG/M2 | TEMPERATURE: 97.9 F | HEIGHT: 69 IN | SYSTOLIC BLOOD PRESSURE: 117 MMHG | OXYGEN SATURATION: 98 %

## 2024-05-14 DIAGNOSIS — R05.1 ACUTE COUGH: ICD-10-CM

## 2024-05-14 DIAGNOSIS — R05.1 ACUTE COUGH: Primary | ICD-10-CM

## 2024-05-14 PROCEDURE — 99213 OFFICE O/P EST LOW 20 MIN: CPT

## 2024-05-14 PROCEDURE — G0463 HOSPITAL OUTPT CLINIC VISIT: HCPCS

## 2024-05-14 PROCEDURE — 71046 X-RAY EXAM CHEST 2 VIEWS: CPT

## 2024-05-14 PROCEDURE — 400014 VN F/U

## 2024-05-14 PROCEDURE — G0299 HHS/HOSPICE OF RN EA 15 MIN: HCPCS

## 2024-05-14 RX ORDER — BENZONATATE 200 MG/1
200 CAPSULE ORAL 3 TIMES DAILY PRN
Qty: 20 CAPSULE | Refills: 0 | Status: SHIPPED | OUTPATIENT
Start: 2024-05-14

## 2024-05-14 RX ORDER — AZITHROMYCIN 250 MG/1
TABLET, FILM COATED ORAL
Qty: 6 TABLET | Refills: 0 | Status: SHIPPED | OUTPATIENT
Start: 2024-05-14 | End: 2024-05-18

## 2024-05-14 NOTE — PATIENT INSTRUCTIONS
Chest x-ray reviewed, no acute abnormality noted, awaiting official read.   Please begin antibiotics as directed.   Follow up with primary care provider within one week.   Go to emergency department for worsening symptoms.

## 2024-05-14 NOTE — TELEPHONE ENCOUNTER
Pt. Wife called and needed today's Appt. With Dr. Auguste. No appt. Was availabl. Called office they did not have any appt. Either. Advised Pt. To go to walk in care. They could not take tomorrows appt. Because of the dialysis.

## 2024-05-14 NOTE — PROGRESS NOTES
[facility]        NAME: Asad Galloway is a 64 y.o. male  : 1960    MRN: [id number]  DATE: May 14, 2024  TIME: 3:50 PM    Assessment and Plan   Acute cough [R05.1]  1. Acute cough  XR chest pa & lateral    azithromycin (ZITHROMAX) 250 mg tablet    benzonatate (TESSALON) 200 MG capsule      Chest x-ray reviewed, no acute abnormality noted, awaiting official read.   Please begin antibiotics as directed.   Follow up with primary care provider within one week.   Go to emergency department for worsening symptoms.       Patient Instructions     Upper Respiratory Infection   WHAT YOU NEED TO KNOW:   An upper respiratory infection is also called a cold. It can affect your nose, throat, ears, and sinuses. Cold symptoms are usually worst for the first 3 to 5 days. Most people get better in 7 to 14 days. You may continue to cough for 2 to 3 weeks. Colds are caused by viruses and do not get better with antibiotics.  DISCHARGE INSTRUCTIONS:   Call your local emergency number (911 in the ) if:   You have chest pain or trouble breathing.        Return to the emergency department if:   You have a fever over 102ºF (39ºC).        Call your doctor if:   You have a low fever.     Your sore throat gets worse or you see white or yellow spots in your throat.     Your symptoms get worse after 3 to 5 days or are not better in 14 days.     You have a rash anywhere on your skin.     You have large, tender lumps in your neck.     You have thick, green, or yellow drainage from your nose.     You cough up thick yellow, green, or bloody mucus.     You have a bad earache.     You have questions or concerns about your condition or care.     Medicines:  You may need any of the following:  Decongestants  help reduce nasal congestion and help you breathe more easily. If you take decongestant pills, they may make you feel restless or cause problems with your sleep. Do not use decongestant sprays for more than a few days.     Cough  suppressants  help reduce coughing. Ask your healthcare provider which type of cough medicine is best for you.      NSAIDs , such as ibuprofen, help decrease swelling, pain, and fever. NSAIDs can cause stomach bleeding or kidney problems in certain people. If you take blood thinner medicine, always ask your healthcare provider if NSAIDs are safe for you. Always read the medicine label and follow directions.     Acetaminophen  decreases pain and fever. It is available without a doctor's order. Ask how much to take and how often to take it. Follow directions. Read the labels of all other medicines you are using to see if they also contain acetaminophen, or ask your doctor or pharmacist. Acetaminophen can cause liver damage if not taken correctly.     Take your medicine as directed.  Contact your healthcare provider if you think your medicine is not helping or if you have side effects. Tell your provider if you are allergic to any medicine. Keep a list of the medicines, vitamins, and herbs you take. Include the amounts, and when and why you take them. Bring the list or the pill bottles to follow-up visits. Carry your medicine list with you in case of an emergency.     Self-care:   Rest as much as possible.  Slowly start to do more each day.     Drink more liquids as directed.  Liquids will help thin and loosen mucus so you can cough it up. Liquids will also help prevent dehydration. Liquids that help prevent dehydration include water, fruit juice, and broth. Do not drink liquids that contain caffeine. Caffeine can increase your risk for dehydration. Ask your healthcare provider how much liquid to drink each day.     Soothe a sore throat.  Gargle with warm salt water. Make salt water by dissolving ¼ teaspoon salt in 1 cup warm water. You may also suck on hard candy or throat lozenges. You may use a sore throat spray.     Use a humidifier or vaporizer.  Use a cool mist humidifier or a vaporizer to increase air moisture  in your home. This may make it easier for you to breathe and help decrease your cough.     Use saline nasal drops as directed.  These help relieve congestion.     Apply petroleum-based jelly around the outside of your nostrils.  This can decrease irritation from blowing your nose.     Do not smoke.  Nicotine and other chemicals in cigarettes and cigars can make your symptoms worse. They can also cause infections such as bronchitis or pneumonia. Ask your healthcare provider for information if you currently smoke and need help to quit. E-cigarettes or smokeless tobacco still contain nicotine. Talk to your healthcare provider before you use these products.     Prevent a cold:   Wash your hands often.  Use soap and water every time you wash your hands. Rub your soapy hands together, lacing your fingers. Use the fingers of one hand to scrub under the nails of the other hand. Wash for at least 20 seconds. Rinse with warm, running water for several seconds. Then dry your hands. Use hand  gel if soap and water are not available. Do not touch your eyes or mouth without washing your hands first.          Cover a sneeze or cough.  Use a tissue that covers your mouth and nose. Put the used tissue in the trash right away. Use the bend of your arm if a tissue is not available. Wash your hands well with soap and water or use a hand . Do not stand close to anyone who is sneezing or coughing.     Try to stay away from others while you are sick.  This is especially important during the first 2 to 3 days when the virus is more easily spread. Wait until a fever, cough, or other symptoms are gone before you return to work or other regular activities.     Do not share items while you are sick.  This includes food, drinks, eating utensils, and dishes.     Follow up with your doctor as directed:  Write down your questions so you remember to ask them during your visits.  © Copyright [employer] 2023 Information is for End  User's use only and may not be sold, redistributed or otherwise used for commercial purposes.  The above information is an  only. It is not intended as medical advice for individual conditions or treatments. Talk to your doctor, nurse or pharmacist before following any medical regimen to see if it is safe and effective for you.          Follow up with PCP in 3-5 days.  Proceed to  ER if symptoms worsen.    Chief Complaint     Chief Complaint   Patient presents with    Cough     X 2 weeks          History of Present Illness       64 year old male with PMH significant for cardiac stent placement, heart failure, ESRD on HD, CVA, HTN, presents with family member for evaluation of productive cough over the past 2 weeks. Wife denies chest pain, SOB, CÁRDENAS, wheezing, fever, leg swelling. He has been taking Robitussin with little relief.     Cough  This is a new problem. The current episode started 1 to 4 weeks ago (x 2 weeks). The problem has been unchanged. The cough is Productive of sputum. Pertinent negatives include no chest pain, chills, ear congestion, ear pain, eye redness, fever, headaches, heartburn, hemoptysis, myalgias, nasal congestion, postnasal drip, rash, rhinorrhea, sore throat, shortness of breath, sweats, weight loss or wheezing. Nothing aggravates the symptoms. He has tried OTC cough suppressant for the symptoms. The treatment provided no relief. There is no history of asthma, bronchiectasis, bronchitis, COPD, emphysema, environmental allergies or pneumonia.       Review of Systems   Review of Systems   Constitutional:  Negative for chills, fatigue, fever and weight loss.   HENT:  Negative for congestion, ear discharge, ear pain, postnasal drip, rhinorrhea, sinus pressure, sinus pain, sneezing and sore throat.    Eyes: Negative.  Negative for pain, discharge, redness and itching.   Respiratory:  Positive for cough. Negative for apnea, hemoptysis, choking, chest tightness, shortness of  breath, wheezing and stridor.    Cardiovascular: Negative.  Negative for chest pain, palpitations and leg swelling.   Gastrointestinal: Negative.  Negative for diarrhea, heartburn, nausea and vomiting.   Endocrine: Negative.  Negative for polydipsia, polyphagia and polyuria.   Genitourinary: Negative.  Negative for decreased urine volume and flank pain.   Musculoskeletal: Negative.  Negative for arthralgias, back pain, myalgias, neck pain and neck stiffness.   Skin: Negative.  Negative for color change and rash.   Allergic/Immunologic: Negative.  Negative for environmental allergies.   Neurological: Negative.  Negative for dizziness, facial asymmetry, light-headedness, numbness and headaches.   Hematological: Negative.  Negative for adenopathy.   Psychiatric/Behavioral: Negative.           Current Medications       Current Outpatient Medications:     aspirin (ECOTRIN LOW STRENGTH) 81 mg EC tablet, Take 81 mg by mouth daily Resume on 8/14  , Disp: , Rfl:     atorvastatin (LIPITOR) 40 mg tablet, TAKE 1 TABLET BY MOUTH DAILY WITH DINNER, Disp: 90 tablet, Rfl: 1    azithromycin (ZITHROMAX) 250 mg tablet, Take 2 tablets today then 1 tablet daily x 4 days, Disp: 6 tablet, Rfl: 0    benzonatate (TESSALON) 200 MG capsule, Take 1 capsule (200 mg total) by mouth 3 (three) times a day as needed for cough, Disp: 20 capsule, Rfl: 0    Blood Glucose Monitoring Suppl (True Metrix Meter) w/Device KIT, Use to test blood sugars 3 times daily, Disp: 1 kit, Rfl: 0    Blood Pressure Monitoring (BLOOD PRESSURE CUFF) MISC, Use to check blood pressure before taking blood pressure medication and 1 hour after and follow instructions provided in discharge instructions based on the readings., Disp: 1 each, Rfl: 0    divalproex sodium (DEPAKOTE SPRINKLE) 125 MG capsule, TAKE 2 CAPSULES (250 MG TOTAL) BY MOUTH EVERY 12 (TWELVE) HOURS, Disp: 360 capsule, Rfl: 1    glucose blood (True Metrix Blood Glucose Test) test strip, Use 1 each 3 (three)  times a day Use as instructed, Disp: 300 strip, Rfl: 1    Lancets MISC, Use 3 (three) times a day Use 3 times daily, Disp: 300 each, Rfl: 0    metoprolol succinate (TOPROL-XL) 50 mg 24 hr tablet, TAKE 1 TABLET BY MOUTH TWICE A DAY, Disp: 180 tablet, Rfl: 3    prasugrel (EFFIENT) tablet, Take 1 tablet (5 mg total) by mouth daily, Disp: 90 tablet, Rfl: 1    sacubitril-valsartan (Entresto) 24-26 MG TABS, Take 1 tablet by mouth in the morning Bedtime, Disp: 90 tablet, Rfl: 3    Sevelamer Carbonate 2.4 g PACK, VIGOROUSLY MIX CONTENTS OF 1 PACKET IN WATER (IT WILL NOT DISSOLVE) AND DRINK 3 TIMES DAILY WITH MEALS, Disp: , Rfl:     sodium hypochlorite (DAKIN'S HALF-STRENGTH) external solution, Apply moistened gauze with Dakin to the wound daily., Disp: 473 mL, Rfl: 1    Vitamin D3 1.25 MG (58510 UT) capsule, TAKE 1 CAPSULE BY MOUTH ONE TIME PER WEEK, Disp: 12 capsule, Rfl: 4    Current Allergies     Allergies as of 05/14/2024    (No Known Allergies)            The following portions of the patient's history were reviewed and updated as appropriate: allergies, current medications, past family history, past medical history, past social history, past surgical history and problem list.     Past Medical History:   Diagnosis Date    Cerebrovascular accident (CVA) due to thrombosis of left middle cerebral artery (HCC) 07/29/2018    Chronic kidney disease     Coronary artery disease     Diabetes mellitus (HCC)     not on meds    Dialysis patient ([facility])     M-W-F    Fistula     left upper arm for hemodialyis    GERD (gastroesophageal reflux disease)     History of coronary artery stent placement     x3    Hypercholesteremia     Hyperlipidemia     Hypertension     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    Limb alert care status     no BP/IV left arm    Neuropathy     Obesity     Osteomyelitis (HCC)     last assessed 11/4/16-per son not currently    PVC's (premature ventricular contractions)     sees  Cardio    Stroke ([facility])      "last weeof July 2018 [facility]    TIA (transient ischemic attack) 10/28/2018    Wears dentures     Wears glasses        Past Surgical History:   Procedure Laterality Date    ABDOMINAL SURGERY      CARDIAC CATHETERIZATION N/A 05/02/2022    Procedure: Cardiac Coronary Angiogram;  Surgeon: Sam Davis MD;  [facility];  Service: Cardiology    CARDIAC CATHETERIZATION N/A 05/02/2022    Procedure: Cardiac pci;  Surgeon: Sam Davis MD;  [facility];  Service: Cardiology    CARDIAC CATHETERIZATION  02/01/2023    Procedure: Cardiac catheterization;  Surgeon: Sam Davis MD;  [facility];  Service: Cardiology    CARDIAC CATHETERIZATION N/A 02/01/2023    Procedure: Cardiac pci;  Surgeon: Sam Davis MD;  [facility];  Service: Cardiology    CARDIAC CATHETERIZATION N/A 02/01/2023    Procedure: Cardiac Coronary Angiogram;  Surgeon: Sam Davis MD;  [facility];  Service: Cardiology    CARDIAC CATHETERIZATION N/A 02/01/2023    Procedure: Cardiac other-IVUS;  Surgeon: Sam Davis MD;  [facility];  Service: Cardiology    CHOLECYSTECTOMY      Percutaneous    COLONOSCOPY      CYSTOSCOPY      HEMODIALYSIS ADULT  1/22/2024    HEMODIALYSIS ADULT  1/24/2024    IR LOWER EXTREMITY ANGIOGRAM  9/29/2023    IR LOWER EXTREMITY ANGIOGRAM  2/26/2024    OTHER SURGICAL HISTORY      \"stimulator to control bowel movements\"    NH ESOPHAGOGASTRODUODENOSCOPY TRANSORAL DIAGNOSTIC N/A 09/27/2016    Procedure: ESOPHAGOGASTRODUODENOSCOPY (EGD);  Surgeon: Adele Rowe MD;  Location: [facility] GI [facility];  Service: Gastroenterology    NH LAPAROSCOPY SURG CHOLECYSTECTOMY N/A 02/29/2016    Procedure: LAPAROSCOPIC CHOLECYSTECTOMY ;  Surgeon: Cliff Roman DO;  Location: [facility];  Service: General    NH SLCTV CATHJ 3RD+ ORD SLCTV ABDL PEL/LXTR BRNCH Left 9/29/2023    Procedure: Left leg angiogram with intervention;  Surgeon: Michelle" "Jonny Galvan MD;  Location: [facility];  Service: Vascular    FL SLCTV CATHJ 3RD+ ORD SLCTV ABDL PEL/LXTR BRNCH Left 2/26/2024    Procedure: Left leg angiogram antegrade access, left popliteal angioplasty;  Surgeon: Michelle Galvan MD;  Location: [facility];  Service: Vascular    ROTATOR CUFF REPAIR Right     SPINAL CORD STIMULATOR IMPLANT      \"Medtronic interstim model # 3058- in lower back to control bowel movements-currently turned off-battery is dead\"    TOE AMPUTATION Right 10/28/2016    Procedure: 3RD TOE AMPUTATION ;  Surgeon: Anjel Salas DPM;  Location: [facility];  Service:        Family History   Problem Relation Age of Onset    Leukemia Mother     Liver disease Mother     Lung cancer Mother         heavy smoker - 3 ppd    Heart disease Father     Liver disease Father     Diabetes type I Father     Multiple myeloma Sister     Breast cancer Sister     Urolithiasis Family     Alcohol abuse Neg Hx     Depression Neg Hx     Drug abuse Neg Hx     Substance Abuse Neg Hx     Mental illness Neg Hx          Medications have been verified.        Objective   BP (!) 117/49   Pulse 60   Temp 97.9 °F (36.6 °C)   Resp 18   Ht 5' 9\" (1.753 m)   Wt 94.3 kg (208 lb)   SpO2 98%   BMI 30.72 kg/m²        Physical Exam     Physical Exam  Vitals reviewed.   Constitutional:       General: He is not in acute distress.     Appearance: Normal appearance. He is not ill-appearing, toxic-appearing or diaphoretic.      Interventions: He is not intubated.  HENT:      Head: Normocephalic and atraumatic.      Right Ear: Tympanic membrane, ear canal and external ear normal. There is no impacted cerumen.      Left Ear: Tympanic membrane, ear canal and external ear normal. There is no impacted cerumen.      Nose: Nose normal. No congestion or rhinorrhea.      Mouth/Throat:      Mouth: Mucous membranes are moist.      Pharynx: Oropharynx is clear. Uvula midline. No pharyngeal swelling, oropharyngeal exudate, posterior " oropharyngeal erythema or uvula swelling.      Tonsils: No tonsillar exudate or tonsillar abscesses. 1+ on the right. 1+ on the left.   Eyes:      Extraocular Movements: Extraocular movements intact.      Conjunctiva/sclera: Conjunctivae normal.      Pupils: Pupils are equal, round, and reactive to light.   Cardiovascular:      Rate and Rhythm: Normal rate and regular rhythm.      Pulses: Normal pulses.      Heart sounds: Normal heart sounds, S1 normal and S2 normal. Heart sounds not distant. No murmur heard.     No friction rub. No gallop.   Pulmonary:      Effort: Pulmonary effort is normal. No tachypnea, bradypnea, accessory muscle usage, prolonged expiration, respiratory distress or retractions. He is not intubated.      Breath sounds: No stridor, decreased air movement or transmitted upper airway sounds. Examination of the right-upper field reveals rhonchi. Examination of the left-upper field reveals rhonchi. Examination of the right-lower field reveals decreased breath sounds. Examination of the left-lower field reveals decreased breath sounds. Decreased breath sounds and rhonchi present. No wheezing or rales.   Chest:      Chest wall: No tenderness.   Musculoskeletal:         General: Normal range of motion.      Cervical back: Normal range of motion and neck supple. No rigidity or tenderness.   Lymphadenopathy:      Cervical: No cervical adenopathy.   Skin:     General: Skin is warm and dry.      Capillary Refill: Capillary refill takes less than 2 seconds.      Findings: No erythema.   Neurological:      General: No focal deficit present.      Mental Status: He is alert.   Psychiatric:         Mood and Affect: Mood normal.

## 2024-05-16 ENCOUNTER — OFFICE VISIT (OUTPATIENT)
Dept: WOUND CARE | Facility: HOSPITAL | Age: 64
End: 2024-05-16
Payer: MEDICARE

## 2024-05-16 ENCOUNTER — HOME CARE VISIT (OUTPATIENT)
Dept: HOME HEALTH SERVICES | Facility: HOME HEALTHCARE | Age: 64
End: 2024-05-16
Payer: MEDICARE

## 2024-05-16 ENCOUNTER — OFFICE VISIT (OUTPATIENT)
Dept: CARDIOLOGY CLINIC | Facility: CLINIC | Age: 64
End: 2024-05-16
Payer: MEDICARE

## 2024-05-16 VITALS
HEART RATE: 57 BPM | TEMPERATURE: 96.7 F | SYSTOLIC BLOOD PRESSURE: 131 MMHG | DIASTOLIC BLOOD PRESSURE: 62 MMHG | RESPIRATION RATE: 16 BRPM

## 2024-05-16 VITALS
SYSTOLIC BLOOD PRESSURE: 112 MMHG | WEIGHT: 209 LBS | HEIGHT: 69 IN | DIASTOLIC BLOOD PRESSURE: 68 MMHG | HEART RATE: 62 BPM | BODY MASS INDEX: 30.96 KG/M2

## 2024-05-16 DIAGNOSIS — Z95.5 STATUS POST INSERTION OF DRUG ELUTING CORONARY ARTERY STENT: ICD-10-CM

## 2024-05-16 DIAGNOSIS — L97.423 ULCER OF HEEL, LEFT, WITH NECROSIS OF MUSCLE (HCC): Primary | ICD-10-CM

## 2024-05-16 DIAGNOSIS — I25.10 CAD, MULTIPLE VESSEL: Primary | ICD-10-CM

## 2024-05-16 DIAGNOSIS — I25.5 ISCHEMIC CARDIOMYOPATHY: ICD-10-CM

## 2024-05-16 DIAGNOSIS — Z99.2 ESRD ON DIALYSIS (HCC): ICD-10-CM

## 2024-05-16 DIAGNOSIS — Z76.82 PRE-KIDNEY TRANSPLANT, LISTED: ICD-10-CM

## 2024-05-16 DIAGNOSIS — Z98.890 S/P ARTERIOVENOUS (AV) FISTULA CREATION: ICD-10-CM

## 2024-05-16 DIAGNOSIS — Z79.02 ANTIPLATELET OR ANTITHROMBOTIC LONG-TERM USE: ICD-10-CM

## 2024-05-16 DIAGNOSIS — I35.0 NONRHEUMATIC AORTIC VALVE STENOSIS: ICD-10-CM

## 2024-05-16 DIAGNOSIS — E78.2 MIXED HYPERLIPIDEMIA: ICD-10-CM

## 2024-05-16 DIAGNOSIS — I10 PRIMARY HYPERTENSION: ICD-10-CM

## 2024-05-16 DIAGNOSIS — E11.40 TYPE 2 DIABETES MELLITUS WITH DIABETIC NEUROPATHY, UNSPECIFIED WHETHER LONG TERM INSULIN USE (HCC): ICD-10-CM

## 2024-05-16 DIAGNOSIS — I50.22 CHRONIC SYSTOLIC HEART FAILURE (HCC): ICD-10-CM

## 2024-05-16 DIAGNOSIS — N18.6 ESRD ON DIALYSIS (HCC): ICD-10-CM

## 2024-05-16 PROCEDURE — 99214 OFFICE O/P EST MOD 30 MIN: CPT | Performed by: INTERNAL MEDICINE

## 2024-05-16 PROCEDURE — 11042 DBRDMT SUBQ TIS 1ST 20SQCM/<: CPT | Performed by: PODIATRIST

## 2024-05-16 PROCEDURE — G2211 COMPLEX E/M VISIT ADD ON: HCPCS | Performed by: INTERNAL MEDICINE

## 2024-05-16 RX ORDER — LIDOCAINE 40 MG/G
CREAM TOPICAL ONCE
Status: COMPLETED | OUTPATIENT
Start: 2024-05-16 | End: 2024-05-16

## 2024-05-16 RX ADMIN — LIDOCAINE: 40 CREAM TOPICAL at 09:49

## 2024-05-16 NOTE — PROGRESS NOTES
[facility]    Name:Asad Galloway   DOS: 5/16/2024     Chief Complaint:   Chief Complaint   Patient presents with    Follow-up     No cardiac complaints.       HISTORY OF PRESENT ILLNESS:    HPI:  Asad Galloway is a 64 y.o. male. He  has a past medical history of Cerebrovascular accident (CVA) due to thrombosis of left middle cerebral artery (HCC) (07/29/2018), Chronic kidney disease, Coronary artery disease, Diabetes mellitus (HCC), Dialysis patient (HCC), Fistula, GERD (gastroesophageal reflux disease), History of coronary artery stent placement, Hypercholesteremia, Hyperlipidemia, Hypertension, Infectious viral hepatitis, Limb alert care status, Neuropathy, Obesity, Osteomyelitis (HCC), PVC's (premature ventricular contractions), Stroke (HCC), TIA (transient ischemic attack) (10/28/2018), Wears dentures, and Wears glasses.    He presents for follow up evaluation. He last saw me in the office on 2/8/24:  He was admitted January 29, 2023 due to sudden onset of shortness of breath also with chest pressure/pain.  He was in acute respiratory failure requiring BiPAP in the ED and is eventually transferred to the ICU.  He was in pulmonary edema.  He underwent volume removal with renal replacement therapy.  He was also found to have elevated troponins and was started on heparin drip.  Echocardiogram showed reduction in LVEF to 25 to 30%.  Last known EF prior was normal.  He was transferred to [place] to undergo cardiac catheterization and was found to have progression of coronary artery disease compared to cardiac catheterization in May 2022.  He had in-stent stenosis of the LAD status post PCI/stent.  He had occluded OM stent which could not be wired thus unable to revascularize.  Also with occlusion of the RPAV branch.  Patient placed on medical therapy and discharged on a LifeVest.  He was unable to tolerate Entresto in the past due to low blood pressure.  Subsequent echocardiogram showed  improvement in LVEF with echo in February 2023 at [facility] showing EF of 35 to 40%.  Repeat echo on 4/24/2023 showed EF of 35%.  Refer to Dr. Weber for ICD evaluation.  Deemed that he is going to be at high risk of infection due to hemodialysis and possible immunosuppression when he gets renal transplant.  Current osteomyelitis also a concern.  Patient referred to heart failure service for further optimization of medical therapy and if he would be a candidate for Entresto or SGLT2 inhibitor, with the hope of improving LVEF more than 35% and not need defibrillator placement.     Today, he reports that he has been doing very well overall. His foot wound has healed, and he continues to tolerate hemodialysis well without chest pain or hypotension during sessions. He was noted to have swelling around his left upper extremity fistula, for which he was recommended to go fistula revision at a date TBD.     He denies chest pain, shortness of breath, diaphoresis, dizziness, palpitations, orthopnea, PND, edema, syncope.    His son reports that they are working on him becoming listed for a kidney transplant at multiple centers, and a colonoscopy has been requested by the transplant teams prior to listing him.          ROS    ROS: Pertinent positives and negatives as described in History of Present Illness. Remainder of a 14 point review of systems was negative.     No Known Allergies     Current Outpatient Medications on File Prior to Visit   Medication Sig Dispense Refill    aspirin (ECOTRIN LOW STRENGTH) 81 mg EC tablet Take 81 mg by mouth daily Resume on 8/14        atorvastatin (LIPITOR) 40 mg tablet TAKE 1 TABLET BY MOUTH DAILY WITH DINNER 90 tablet 1    Blood Glucose Monitoring Suppl (True Metrix Meter) w/Device KIT Use to test blood sugars 3 times daily 1 kit 0    Blood Pressure Monitoring (BLOOD PRESSURE CUFF) MISC Use to check blood pressure before taking blood pressure medication and 1 hour after and follow instructions  provided in discharge instructions based on the readings. 1 each 0    divalproex sodium (DEPAKOTE SPRINKLE) 125 MG capsule TAKE 2 CAPSULES (250 MG TOTAL) BY MOUTH EVERY 12 (TWELVE) HOURS 360 capsule 1    glucose blood (True Metrix Blood Glucose Test) test strip Use 1 each 3 (three) times a day Use as instructed 300 strip 1    Lancets MISC Use 3 (three) times a day Use 3 times daily 300 each 0    metoprolol succinate (TOPROL-XL) 50 mg 24 hr tablet TAKE 1 TABLET BY MOUTH TWICE A  tablet 3    prasugrel (EFFIENT) tablet Take 1 tablet (5 mg total) by mouth daily 90 tablet 1    sacubitril-valsartan (Entresto) 24-26 MG TABS Take 1 tablet by mouth in the morning Bedtime 90 tablet 3    Sevelamer Carbonate 2.4 g PACK VIGOROUSLY MIX CONTENTS OF 1 PACKET IN WATER (IT WILL NOT DISSOLVE) AND DRINK 3 TIMES DAILY WITH MEALS      sodium hypochlorite (DAKIN'S HALF-STRENGTH) external solution Apply moistened gauze with Dakin to the wound daily. 473 mL 1    Vitamin D3 1.25 MG (13184 UT) capsule TAKE 1 CAPSULE BY MOUTH ONE TIME PER WEEK 12 capsule 4    azithromycin (ZITHROMAX) 250 mg tablet Take 2 tablets today then 1 tablet daily x 4 days (Patient not taking: Reported on 5/16/2024) 6 tablet 0    benzonatate (TESSALON) 200 MG capsule Take 1 capsule (200 mg total) by mouth 3 (three) times a day as needed for cough (Patient not taking: Reported on 5/16/2024) 20 capsule 0     No current facility-administered medications on file prior to visit.       Past Medical History:   Diagnosis Date    Cerebrovascular accident (CVA) due to thrombosis of left middle cerebral artery (HCC) 07/29/2018    Chronic kidney disease     Coronary artery disease     Diabetes mellitus (HCC)     not on meds    Dialysis patient (HCC)     M-W-F    Fistula     left upper arm for hemodialyis    GERD (gastroesophageal reflux disease)     History of coronary artery stent placement     x3    Hypercholesteremia     Hyperlipidemia     Hypertension     Infectious  "viral hepatitis     B as child    Limb alert care status     no BP/IV left arm    Neuropathy     Obesity     Osteomyelitis (HCC)     last assessed 11/4/16-per son not currently    PVC's (premature ventricular contractions)     sees [facility]    Stroke (HCC)     last weeof July 2018 [facility]    TIA (transient ischemic attack) 10/28/2018    Wears dentures     Wears glasses        Past Surgical History:   Procedure Laterality Date    ABDOMINAL SURGERY      CARDIAC CATHETERIZATION N/A 05/02/2022    Procedure: Cardiac Coronary Angiogram;  Surgeon: Sam Davis MD;  [facility];  Service: Cardiology    CARDIAC CATHETERIZATION N/A 05/02/2022    Procedure: Cardiac pci;  Surgeon: Sam Davis MD;  [facility];  Service: Cardiology    CARDIAC CATHETERIZATION  02/01/2023    Procedure: Cardiac catheterization;  Surgeon: Sam Davis MD;  [facility];  Service: Cardiology    CARDIAC CATHETERIZATION N/A 02/01/2023    Procedure: Cardiac pci;  Surgeon: Sam Davis MD;  [facility];  Service: Cardiology    CARDIAC CATHETERIZATION N/A 02/01/2023    Procedure: Cardiac Coronary Angiogram;  Surgeon: Sam Davis MD;  [facility];  Service: Cardiology    CARDIAC CATHETERIZATION N/A 02/01/2023    Procedure: Cardiac other-IVUS;  Surgeon: Sam Davis MD;  [facility];  Service: Cardiology    CHOLECYSTECTOMY      Percutaneous    COLONOSCOPY      CYSTOSCOPY      HEMODIALYSIS ADULT  1/22/2024    HEMODIALYSIS ADULT  1/24/2024    IR LOWER EXTREMITY ANGIOGRAM  9/29/2023    IR LOWER EXTREMITY ANGIOGRAM  2/26/2024    OTHER SURGICAL HISTORY      \"stimulator to control bowel movements\"    IA ESOPHAGOGASTRODUODENOSCOPY TRANSORAL DIAGNOSTIC N/A 09/27/2016    Procedure: ESOPHAGOGASTRODUODENOSCOPY (EGD);  Surgeon: Adele Rowe MD;  Location: [facility];  Service: Gastroenterology    IA LAPAROSCOPY SURG CHOLECYSTECTOMY N/A " "02/29/2016    Procedure: LAPAROSCOPIC CHOLECYSTECTOMY ;  Surgeon: Cliff Roman DO;  Location[facility];  Service: General    OR SLCTV CATHJ 3RD+ ORD SLCTV ABDL PEL/LXTR BRNCH Left 9/29/2023    Procedure: Left leg angiogram with intervention;  Surgeon: Michelle Galvan MD;  [facility];  Service: Vascular    OR SLCTV CATHJ 3RD+ ORD SLCTV ABDL PEL/LXTR BRNCH Left 2/26/2024    Procedure: Left leg angiogram antegrade access, left popliteal angioplasty;  Surgeon: Michelle Galvan MD;  [facility];  Service: Vascular    ROTATOR CUFF REPAIR Right     SPINAL CORD STIMULATOR IMPLANT      \"Medtronic interstim model # 3058- in lower back to control bowel movements-currently turned off-battery is dead\"    TOE AMPUTATION Right 10/28/2016    Procedure: 3RD TOE AMPUTATION ;  Surgeon: Anjel Salas DPM;  [facility];  Service:        Family History   Problem Relation Age of Onset    Leukemia Mother     Liver disease Mother     Lung cancer Mother         heavy smoker - 3 ppd    Heart disease Father     Liver disease Father     Diabetes type I Father     Multiple myeloma Sister     Breast cancer Sister     Urolithiasis Family     Alcohol abuse Neg Hx     Depression Neg Hx     Drug abuse Neg Hx     Substance Abuse Neg Hx     Mental illness Neg Hx        Social History     Socioeconomic History    Marital status: /Civil Union     Spouse name: Not on file    Number of children: Not on file    Years of education: Not on file    Highest education level: Not asked   Occupational History    Occupation: disabled   Tobacco Use    Smoking status: Never    Smokeless tobacco: Never   Vaping Use    Vaping status: Never Used   Substance and Sexual Activity    Alcohol use: Never    Drug use: No    Sexual activity: Not Currently     Partners: Female     Comment: defer   Other Topics Concern    Not on file   Social History Narrative    Daily caffeine consumption 2-3 servings a day     Social " "Determinants of Health     Financial Resource Strain: Patient Declined (7/13/2023)    Overall Financial Resource Strain (CARDIA)     Difficulty of Paying Living Expenses: Patient declined   Food Insecurity: No Food Insecurity (1/19/2024)    Hunger Vital Sign     Worried About Running Out of Food in the Last Year: Never true     Ran Out of Food in the Last Year: Never true   Transportation Needs: No Transportation Needs (1/19/2024)    PRAPARE - Transportation     Lack of Transportation (Medical): No     Lack of Transportation (Non-Medical): No   Physical Activity: Not on file   Stress: No Stress Concern Present (1/18/2019)    [employer] [employer] - Occupational Stress Questionnaire     Feeling of Stress : Only a little   Social Connections: Unknown (1/18/2019)    Social Connection and Isolation Panel [NHANES]     Frequency of Communication with Friends and Family: Not on file     Frequency of Social Gatherings with Friends and Family: Not on file     Attends Buddhism Services: Not on file     Active Member of Clubs or Organizations: Not on file     Attends Club or Organization Meetings: Not on file     Marital Status:    Intimate Partner Violence: Not At Risk (1/18/2019)    Humiliation, Afraid, Rape, and Kick questionnaire     Fear of Current or Ex-Partner: No     Emotionally Abused: No     Physically Abused: No     Sexually Abused: No   Housing Stability: Low Risk  (1/19/2024)    Housing Stability Vital Sign     Unable to Pay for Housing in the Last Year: No     Number of Places Lived in the Last Year: 1     Unstable Housing in the Last Year: No       OBJECTIVE:    /68 (BP Location: Right arm, Patient Position: Sitting, Cuff Size: Standard) Comment (BP Location): omron machine  Pulse 62   Ht 5' 9\" (1.753 m)   Wt 94.8 kg (209 lb) Comment: verbal  BMI 30.86 kg/m²      BP Readings from Last 3 Encounters:   05/16/24 112/68   05/14/24 (!) 117/49   05/14/24 110/66       Wt Readings " from Last 3 Encounters:   05/16/24 94.8 kg (209 lb)   05/14/24 94.3 kg (208 lb)   04/16/24 96.2 kg (212 lb)         Physical Exam  Vitals reviewed.   Constitutional:       General: He is not in acute distress.     Appearance: Normal appearance. He is not diaphoretic.      Comments: Objectively appears improved compared to prior assessments.   HENT:      Head: Normocephalic and atraumatic.   Eyes:      Conjunctiva/sclera: Conjunctivae normal.   Neck:      Vascular: No carotid bruit or JVD.   Cardiovascular:      Rate and Rhythm: Normal rate and regular rhythm.      Pulses: Normal pulses.      Heart sounds: Normal heart sounds. No murmur heard.     No friction rub. No gallop.   Pulmonary:      Effort: Pulmonary effort is normal.      Breath sounds: Normal breath sounds. No wheezing, rhonchi or rales.   Abdominal:      General: Abdomen is flat. Bowel sounds are normal. There is no distension.      Palpations: Abdomen is soft.   Musculoskeletal:      Right lower leg: No edema.      Left lower leg: No edema.   Skin:     General: Skin is warm and dry.      Comments: LUE AV fistula   Neurological:      General: No focal deficit present.      Mental Status: He is alert and oriented to person, place, and time.   Psychiatric:         Mood and Affect: Mood normal.         Behavior: Behavior normal.                                                       Cardiac testing:   EKG reviewed personally as performed on 1/19/24. My read: NSR. RBBB. Non-specific T wave abnormality.        LABS:  Lab Results   Component Value Date    GLUCOSE 92 09/29/2023    BUN 31 (H) 02/22/2024    CREATININE 5.25 (H) 02/22/2024    CALCIUM 9.1 02/22/2024     08/10/2016    K 4.5 02/22/2024    CO2 33 (H) 02/22/2024    CL 94 (L) 02/22/2024    ALKPHOS 92 01/22/2024    BILITOT 0.6 08/10/2016    PROT 6.7 08/10/2016    AST 11 (L) 01/22/2024    ALT 9 01/22/2024    ANIONGAP 9 01/03/2016        Lab Results   Component Value Date    WBC 5.53 02/22/2024    HGB  "10.9 (L) 02/22/2024    HCT 35.8 (L) 02/22/2024    MCV 99 (H) 02/22/2024     (L) 02/22/2024       Lab Results   Component Value Date    CHOL 203 (H) 08/10/2016    HDL 30 (L) 01/30/2023    LDLCALC 21 01/30/2023    TRIG 123 01/30/2023       Lab Results   Component Value Date    HGBA1C 5.1 01/10/2024       ASSESSMENT/PLAN:  Diagnoses and all orders for this visit:    CAD, multiple vessel    Status post insertion of drug eluting coronary artery stent    Ischemic cardiomyopathy    Chronic systolic heart failure (HCC)    Nonrheumatic aortic valve stenosis    ESRD on dialysis (HCC)    S/P arteriovenous (AV) fistula creation    Pre-kidney transplant, listed    Mixed hyperlipidemia    Antiplatelet or antithrombotic long-term use    Primary hypertension    64 year old male presenting for follow up evaluation. He is doing very well overall, with excellent family support. He remains fully compliant with all medications including dual antiplatelet therapy aspirin and prasugrel. We discussed his GDMT regimen for ischemic cardiomyopathy, which presently consists of maximally tolerated Toprol-XL and once daily Entresto. I have recommended continued medical management as noted, no changes to medication regimen made at this time.       Britney Tan MD      Portions of the record may have been created with voice recognition software. Occasional wrong word or \"sound alike\" substitutions may have occurred due to the inherent limitations of voice recognition software. Please review the chart carefully and recognize, using context, where substitutions/typographical errors may have occurred.     "

## 2024-05-16 NOTE — PROGRESS NOTES
"Patient ID: Asad Galloway is a 64 y.o. male Date of Birth 1960     Chief Complaint  Chief Complaint   Patient presents with    Follow Up Wound Care Visit     Left heel wound       Allergies  Patient has no known allergies.    Assessment:    No problem-specific Assessment & Plan notes found for this encounter.       Diagnoses and all orders for this visit:    Ulcer of heel, left, with necrosis of muscle (HCC)  -     lidocaine (LMX) 4 % cream  -     Debridement Diabetic Ulcer Left Heel  -     Wound cleansing and dressings Diabetic Ulcer Left Heel; Future  -     Wound Procedure Treatment Diabetic Ulcer Left Heel    Type 2 diabetes mellitus with diabetic neuropathy, unspecified whether long term insulin use (HCC)  -     lidocaine (LMX) 4 % cream  -     Debridement Diabetic Ulcer Left Heel  -     Wound cleansing and dressings Diabetic Ulcer Left Heel; Future  -     Wound Procedure Treatment Diabetic Ulcer Left Heel        Debridement   Wound 06/02/23 Diabetic Ulcer Heel Left    Universal Protocol:  Consent: Verbal consent obtained.  Risks and benefits: risks, benefits and alternatives were discussed  Consent given by: patient  Time out: Immediately prior to procedure a \"time out\" was called to verify the correct patient, procedure, equipment, support staff and site/side marked as required.  Timeout called at: 5/16/2024 10:02 AM.  Patient understanding: patient states understanding of the procedure being performed  Patient identity confirmed: verbally with patient    Debridement Details  Performed by: physician  Debridement type: surgical  Level of debridement: subcutaneous tissue  Pain control: lidocaine 4%      Post-debridement measurements  Length (cm): 0.6  Width (cm): 0.5  Depth (cm): 0.4  Percent debrided: 100%  Surface Area (cm^2): 0.3  Area Debrided (cm^2): 0.3  Volume (cm^3): 0.12    Tissue and other material debrided: dermis, epidermis and subcutaneous tissue  Devitalized tissue debrided: callus, fibrin " and slough  Instrument(s) utilized: blade  Bleeding: small  Hemostasis obtained with: pressure  Procedural pain (0-10): 0  Post-procedural pain: 0   Response to treatment: procedure was tolerated well        Plan:  Reviewed medical records.  Discussed his condition and prognosis.    Wound is stable and healing.  Continue serial debridement and Dakin solution. Stressed on patient compliance would  be required to help with his condition.  High risk for limb loss due to the extent of the wound and comorbidities.   Discussed increased protein diet.    Globoped shoe for off-loading.  Prevalon boot at night.    RA in 1 week.        Wound 06/02/23 Diabetic Ulcer Heel Left (Active)   Enter Oliveros score: Oliveros Grade 2: Deep ulcer extended to ligament, tendon, joint capsule, bone, or deep fascia without abscess or osteomyelitis (OM) 05/16/24 0948   Wound Image Images linked 05/16/24 0957   Wound Description Slough;Yellow;Pink 05/16/24 0948   Lani-wound Assessment Callus 05/16/24 0948   Wound Length (cm) 0.4 cm 05/16/24 0948   Wound Width (cm) 0.4 cm 05/16/24 0948   Wound Depth (cm) 0.3 cm 05/16/24 0948   Wound Surface Area (cm^2) 0.16 cm^2 05/16/24 0948   Wound Volume (cm^3) 0.048 cm^3 05/16/24 0948   Calculated Wound Volume (cm^3) 0.05 cm^3 05/16/24 0948   Drainage Amount Scant 05/16/24 0948   Drainage Description Serosanguineous 05/16/24 0948   Non-staged Wound Description Full thickness 05/16/24 0948   Dressing Status Intact 05/16/24 0948       Wound 06/02/23 Diabetic Ulcer Heel Left (Active)   Date First Assessed/Time First Assessed: 06/02/23 1908   Primary Wound Type: Diabetic Ulcer  Location: Heel  Wound Location Orientation: Left  Dressing Status: Clean;Dry;Intact       [REMOVED] Wound 01/29/23 Abrasion(s) Pretibial Right (Removed)   Resolved Date/Resolved Time: 12/21/23 0834  Date First Assessed/Time First Assessed: 01/29/23 1218   Traumatic Wound Type: Abrasion(s)  Location: Pretibial  Wound Location Orientation:  Right  Wound Description (Comments): Scabbed  Wound Outcome: Other...       [REMOVED] Wound 02/01/23 Wrist Anterior;Right (Removed)   Resolved Date: 12/26/23  Date First Assessed/Time First Assessed: 02/01/23 1806   Location: Wrist  Wound Location Orientation: Anterior;Right       [REMOVED] Wound 02/01/23 Skin Tear Abrasion(s) Arm Left;Posterior;Proximal (Removed)   Resolved Date: 12/26/23  Date First Assessed/Time First Assessed: 02/01/23 1930   Primary Wound Type: Skin Tear  Traumatic Wound Type: Abrasion(s)  Location: Arm  Wound Location Orientation: Left;Posterior;Proximal       [REMOVED] Wound 09/29/23 Groin Right (Removed)   Resolved Date: 12/26/23  Date First Assessed/Time First Assessed: 09/29/23 0957   Location: Groin  Wound Location Orientation: Right  Wound Description (Comments): New Puncture site noted   Incision's 1st Dressing: ADHESIVE SKIN HIGH VISCOSITY EXOFIN ...       [REMOVED] Wound 02/26/24 Groin Left (Removed)   Resolved Date: 03/05/24  Date First Assessed/Time First Assessed: 02/26/24 1410   Location: Groin  Wound Location Orientation: Left  Wound Description (Comments): LEFT GROIN ACCESS SITE, SOFT, DRY, NO HEMATOMA  Incision's 1st Dressing: ADHESIVE SKIN H...       Subjective:          HPI    The patient presents for evaluation of left heel ulcer.  No pain associated with the wound.  No new complaint.  BS under control.        The following portions of the patient's history were reviewed and updated as appropriate: allergies, current medications, past family history, past medical history, past social history, past surgical history, and problem list.      PAST MEDICAL HISTORY:  Past Medical History:   Diagnosis Date    Cerebrovascular accident (CVA) due to thrombosis of left middle cerebral artery (HCC) 07/29/2018    Chronic kidney disease     Coronary artery disease     Diabetes mellitus (HCC)     not on meds    Dialysis patient (HCC)     M-W-F    Fistula     left upper arm for hemodialyis  "   GERD (gastroesophageal reflux disease)     History of coronary artery stent placement     x3    Hypercholesteremia     Hyperlipidemia     Hypertension     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    Limb alert care status     no BP/IV left arm    Neuropathy     Obesity     Osteomyelitis (HCC)     last assessed 11/4/16-per son not currently    PVC's (premature ventricular contractions)     sees  Cardio    Stroke (HCC)     last weeof July 2018 [facility]    TIA (transient ischemic attack) 10/28/2018    Wears dentures     Wears glasses        PAST SURGICAL HISTORY:  Past Surgical History:   Procedure Laterality Date    ABDOMINAL SURGERY      CARDIAC CATHETERIZATION N/A 05/02/2022    Procedure: Cardiac Coronary Angiogram;  Surgeon: Sam Davis MD;  [facility];  Service: Cardiology    CARDIAC CATHETERIZATION N/A 05/02/2022    Procedure: Cardiac pci;  Surgeon: Sam Davis MD;  [facility];  Service: Cardiology    CARDIAC CATHETERIZATION  02/01/2023    Procedure: Cardiac catheterization;  Surgeon: Sam Davis MD;  [facility];  Service: Cardiology    CARDIAC CATHETERIZATION N/A 02/01/2023    Procedure: Cardiac pci;  Surgeon: Sam Davis MD;  [facility];  Service: Cardiology    CARDIAC CATHETERIZATION N/A 02/01/2023    Procedure: Cardiac Coronary Angiogram;  Surgeon: Sam Davis MD;  [facility];  Service: Cardiology    CARDIAC CATHETERIZATION N/A 02/01/2023    Procedure: Cardiac other-IVUS;  Surgeon: Sam Davis MD;  [facility];  Service: Cardiology    CHOLECYSTECTOMY      Percutaneous    COLONOSCOPY      CYSTOSCOPY      HEMODIALYSIS ADULT  1/22/2024    HEMODIALYSIS ADULT  1/24/2024    IR LOWER EXTREMITY ANGIOGRAM  9/29/2023    IR LOWER EXTREMITY ANGIOGRAM  2/26/2024    OTHER SURGICAL HISTORY      \"stimulator to control bowel movements\"    OK ESOPHAGOGASTRODUODENOSCOPY TRANSORAL " "DIAGNOSTIC N/A 09/27/2016    Procedure: ESOPHAGOGASTRODUODENOSCOPY (EGD);  Surgeon: Adele Rowe MD;  Location: [facility];  Service: Gastroenterology    WA LAPAROSCOPY SURG CHOLECYSTECTOMY N/A 02/29/2016    Procedure: LAPAROSCOPIC CHOLECYSTECTOMY ;  Surgeon: Cliff Roman DO;  Location: [facility];  Service: General    WA SLCTV CATHJ 3RD+ ORD SLCTV ABDL PEL/LXTR BRNCH Left 9/29/2023    Procedure: Left leg angiogram with intervention;  Surgeon: Michelle Galvan MD;  [facility];  Service: Vascular    WA SLCTV CATHJ 3RD+ ORD SLCTV ABDL PEL/LXTR BRNCH Left 2/26/2024    Procedure: Left leg angiogram antegrade access, left popliteal angioplasty;  Surgeon: Michelle Galvan MD;  Location[facility];  Service: Vascular    ROTATOR CUFF REPAIR Right     SPINAL CORD STIMULATOR IMPLANT      \"Medtronic interstim model # 3058- in lower back to control bowel movements-currently turned off-battery is dead\"    TOE AMPUTATION Right 10/28/2016    Procedure: 3RD TOE AMPUTATION ;  Surgeon: Anjel Salas DPM;  [facility];  Service:         ALLERGIES:  Patient has no known allergies.    MEDICATIONS:  Current Outpatient Medications   Medication Sig Dispense Refill    aspirin (ECOTRIN LOW STRENGTH) 81 mg EC tablet Take 81 mg by mouth daily Resume on 8/14        atorvastatin (LIPITOR) 40 mg tablet TAKE 1 TABLET BY MOUTH DAILY WITH DINNER 90 tablet 1    azithromycin (ZITHROMAX) 250 mg tablet Take 2 tablets today then 1 tablet daily x 4 days (Patient not taking: Reported on 5/16/2024) 6 tablet 0    benzonatate (TESSALON) 200 MG capsule Take 1 capsule (200 mg total) by mouth 3 (three) times a day as needed for cough (Patient not taking: Reported on 5/16/2024) 20 capsule 0    Blood Glucose Monitoring Suppl (True Metrix Meter) w/Device KIT Use to test blood sugars 3 times daily 1 kit 0    Blood Pressure Monitoring (BLOOD PRESSURE CUFF) MISC Use to check blood pressure before taking blood pressure medication " and 1 hour after and follow instructions provided in discharge instructions based on the readings. 1 each 0    divalproex sodium (DEPAKOTE SPRINKLE) 125 MG capsule TAKE 2 CAPSULES (250 MG TOTAL) BY MOUTH EVERY 12 (TWELVE) HOURS 360 capsule 1    glucose blood (True Metrix Blood Glucose Test) test strip Use 1 each 3 (three) times a day Use as instructed 300 strip 1    Lancets MISC Use 3 (three) times a day Use 3 times daily 300 each 0    metoprolol succinate (TOPROL-XL) 50 mg 24 hr tablet TAKE 1 TABLET BY MOUTH TWICE A  tablet 3    prasugrel (EFFIENT) tablet Take 1 tablet (5 mg total) by mouth daily 90 tablet 1    sacubitril-valsartan (Entresto) 24-26 MG TABS Take 1 tablet by mouth in the morning Bedtime 90 tablet 3    Sevelamer Carbonate 2.4 g PACK VIGOROUSLY MIX CONTENTS OF 1 PACKET IN WATER (IT WILL NOT DISSOLVE) AND DRINK 3 TIMES DAILY WITH MEALS      sodium hypochlorite (DAKIN'S HALF-STRENGTH) external solution Apply moistened gauze with Dakin to the wound daily. 473 mL 1    Vitamin D3 1.25 MG (84972 UT) capsule TAKE 1 CAPSULE BY MOUTH ONE TIME PER WEEK 12 capsule 4     No current facility-administered medications for this visit.       SOCIAL HISTORY:  Social History     Socioeconomic History    Marital status: /Civil Union     Spouse name: None    Number of children: None    Years of education: None    Highest education level: Not asked   Occupational History    Occupation: disabled   Tobacco Use    Smoking status: Never    Smokeless tobacco: Never   Vaping Use    Vaping status: Never Used   Substance and Sexual Activity    Alcohol use: Never    Drug use: No    Sexual activity: Not Currently     Partners: Female     Comment: defer   Other Topics Concern    None   Social History Narrative    Daily caffeine consumption 2-3 servings a day     Social Determinants of Health     Financial Resource Strain: Patient Declined (7/13/2023)    Overall Financial Resource Strain (CARDIA)     Difficulty of Paying  Living Expenses: Patient declined   Food Insecurity: No Food Insecurity (1/19/2024)    Hunger Vital Sign     Worried About Running Out of Food in the Last Year: Never true     Ran Out of Food in the Last Year: Never true   Transportation Needs: No Transportation Needs (1/19/2024)    PRAPARE - Transportation     Lack of Transportation (Medical): No     Lack of Transportation (Non-Medical): No   Physical Activity: Not on file   Stress: No Stress Concern Present (1/18/2019)    [place] [employer] - Occupational Stress Questionnaire     Feeling of Stress : Only a little   Social Connections: Unknown (1/18/2019)    Social Connection and Isolation Panel [NHANES]     Frequency of Communication with Friends and Family: Not on file     Frequency of Social Gatherings with Friends and Family: Not on file     Attends Yarsani Services: Not on file     Active Member of Clubs or Organizations: Not on file     Attends Club or Organization Meetings: Not on file     Marital Status:    Intimate Partner Violence: Not At Risk (1/18/2019)    Humiliation, Afraid, Rape, and Kick questionnaire     Fear of Current or Ex-Partner: No     Emotionally Abused: No     Physically Abused: No     Sexually Abused: No   Housing Stability: Low Risk  (1/19/2024)    Housing Stability Vital Sign     Unable to Pay for Housing in the Last Year: No     Number of Places Lived in the Last Year: 1     Unstable Housing in the Last Year: No        Review of Systems   Constitutional:  Negative for chills and fever.   Respiratory:  Negative for cough and shortness of breath.    Cardiovascular:  Negative for chest pain.   Gastrointestinal:  Negative for nausea and vomiting.   Skin:  Positive for wound.   Neurological:  Positive for numbness. Negative for weakness.         Objective:       Wound 06/02/23 Diabetic Ulcer Heel Left (Active)   Enter Oliveros score: Oliveros Grade 2: Deep ulcer extended to ligament, tendon, joint capsule, bone, or  deep fascia without abscess or osteomyelitis (OM) 24   Wound Image Images linked 24   Wound Description Slough;Yellow;Pink 24   Lani-wound Assessment Callus 24   Wound Length (cm) 0.4 cm 24   Wound Width (cm) 0.4 cm 24   Wound Depth (cm) 0.3 cm 24   Wound Surface Area (cm^2) 0.16 cm^2 24   Wound Volume (cm^3) 0.048 cm^3 24   Calculated Wound Volume (cm^3) 0.05 cm^3 24   Drainage Amount Scant 24   Drainage Description Serosanguineous 24   Non-staged Wound Description Full thickness 24   Dressing Status Intact 24       /62   Pulse 57   Temp (!) 96.7 °F (35.9 °C)   Resp 16     Physical Exam  Vitals and nursing note reviewed.   Constitutional:       General: He is not in acute distress.     Appearance: He is not toxic-appearing or diaphoretic.   Cardiovascular:      Rate and Rhythm: Normal rate and regular rhythm.      Pulses:           Dorsalis pedis pulses are 1+ on the left side.        Posterior tibial pulses are detected w/ Doppler on the left side.   Pulmonary:      Effort: Pulmonary effort is normal. No respiratory distress.   Musculoskeletal:         General: No signs of injury.      Right foot: No foot drop.      Left foot: No foot drop.   Feet:      Comments: Biphasic PTA left.  Skin:     General: Skin is warm.      Capillary Refill: Capillary refill takes less than 2 seconds.      Coloration: Skin is not cyanotic or mottled.      Findings: No abscess or erythema.      Nails: There is no clubbing.      Comments: DFU noted left heel.   in depth and size.  Mild periwound callus.  Increased granular tissues.  No malodor.  Wound does not probe to bone.  No purulence or redness.  No signs of infection.  See wound assessment and photo.     Neurological:      General: No focal deficit present.      Mental Status: He is alert and oriented to person,  place, and time.      Cranial Nerves: No cranial nerve deficit.      Sensory: No sensory deficit.      Motor: No weakness.      Coordination: Coordination normal.   Psychiatric:         Mood and Affect: Mood normal.         Behavior: Behavior normal.         Thought Content: Thought content normal.         Judgment: Judgment normal.           Wound Instructions:  Orders Placed This Encounter   Procedures    Debridement Diabetic Ulcer Left Heel     This order was created via procedure documentation    Wound cleansing and dressings Diabetic Ulcer Left Heel     Wound location: Left heel Change dressing Daily The dressing must stay dry and intact. You may shower with dressing protected by cast cover or bag. Or you may sponge bathe. Call wound center or home health nurse if dressing becomes wet. Apply Zinc to mona wound. Apply 1/4 strength dakins moistened gauze wet to dry dressing to wound bed. Cover with ABD. Secure with rolled gauze and tape. Continue to wear Globoped shoe when out of bed. When in bed, please keep Prevalon boot on at all times to offload heel.     Standing Status:   Future     Standing Expiration Date:   5/23/2024    Wound Procedure Treatment Diabetic Ulcer Left Heel     This order was created via procedure documentation        Diagnosis ICD-10-CM Associated Orders   1. Ulcer of heel, left, with necrosis of muscle ([facility])  L97.423 lidocaine (LMX) 4 % cream     Debridement Diabetic Ulcer Left Heel     Wound cleansing and dressings Diabetic Ulcer Left Heel     Wound Procedure Treatment Diabetic Ulcer Left Heel      2. Type 2 diabetes mellitus with diabetic neuropathy, unspecified whether long term insulin use ([facility])  E11.40 lidocaine (LMX) 4 % cream     Debridement Diabetic Ulcer Left Heel     Wound cleansing and dressings Diabetic Ulcer Left Heel     Wound Procedure Treatment Diabetic Ulcer Left Heel

## 2024-05-16 NOTE — PATIENT INSTRUCTIONS
Orders Placed This Encounter   Procedures    Debridement Diabetic Ulcer Left Heel     This order was created via procedure documentation    Wound cleansing and dressings Diabetic Ulcer Left Heel     Wound location: Left heel Change dressing Daily The dressing must stay dry and intact. You may shower with dressing protected by cast cover or bag. Or you may sponge bathe. Call wound center or home health nurse if dressing becomes wet. Apply Zinc to mona wound. Apply 1/4 strength dakins moistened gauze wet to dry dressing to wound bed. Cover with ABD. Secure with rolled gauze and tape. Continue to wear Globoped shoe when out of bed. When in bed, please keep Prevalon boot on at all times to offload heel.     Standing Status:   Future     Standing Expiration Date:   5/23/2024

## 2024-05-16 NOTE — PATIENT INSTRUCTIONS
You were seen today in the Cardiology office for follow up evaluation.     Please continue your current cardiac medications as prescribed.    Thank you for choosing [facility].    Please call our office or use [employer] with any questions.

## 2024-05-16 NOTE — PROGRESS NOTES
Wound Procedure Treatment Diabetic Ulcer Left Heel    Performed by: Kylah Bocanegra RN  Authorized by: Franklin Church DPM    Associated wounds:   Wound 06/02/23 Diabetic Ulcer Heel Left  Wound cleansed with:  Dakin's 0.25%  Applied secondary dressing:  Gauze  Wound secured with:  Javi and Tape  Offloading device appllied:  Heel relief shoe

## 2024-05-21 ENCOUNTER — HOME CARE VISIT (OUTPATIENT)
Dept: HOME HEALTH SERVICES | Facility: HOME HEALTHCARE | Age: 64
End: 2024-05-21
Payer: MEDICARE

## 2024-05-21 VITALS
TEMPERATURE: 97.1 F | DIASTOLIC BLOOD PRESSURE: 60 MMHG | RESPIRATION RATE: 16 BRPM | OXYGEN SATURATION: 98 % | SYSTOLIC BLOOD PRESSURE: 110 MMHG | HEART RATE: 57 BPM

## 2024-05-21 PROCEDURE — G0299 HHS/HOSPICE OF RN EA 15 MIN: HCPCS

## 2024-05-23 ENCOUNTER — OFFICE VISIT (OUTPATIENT)
Dept: WOUND CARE | Facility: HOSPITAL | Age: 64
End: 2024-05-23
Payer: MEDICARE

## 2024-05-23 ENCOUNTER — HOME CARE VISIT (OUTPATIENT)
Dept: HOME HEALTH SERVICES | Facility: HOME HEALTHCARE | Age: 64
End: 2024-05-23
Payer: MEDICARE

## 2024-05-23 VITALS
DIASTOLIC BLOOD PRESSURE: 66 MMHG | RESPIRATION RATE: 20 BRPM | TEMPERATURE: 96.6 F | SYSTOLIC BLOOD PRESSURE: 146 MMHG | HEART RATE: 61 BPM

## 2024-05-23 DIAGNOSIS — E11.40 TYPE 2 DIABETES MELLITUS WITH DIABETIC NEUROPATHY, UNSPECIFIED WHETHER LONG TERM INSULIN USE (HCC): ICD-10-CM

## 2024-05-23 DIAGNOSIS — L97.423 ULCER OF HEEL, LEFT, WITH NECROSIS OF MUSCLE (HCC): Primary | ICD-10-CM

## 2024-05-23 PROCEDURE — 97597 DBRDMT OPN WND 1ST 20 CM/<: CPT | Performed by: PODIATRIST

## 2024-05-23 PROCEDURE — 82948 REAGENT STRIP/BLOOD GLUCOSE: CPT | Performed by: PODIATRIST

## 2024-05-23 RX ORDER — LIDOCAINE 40 MG/G
CREAM TOPICAL ONCE
Status: COMPLETED | OUTPATIENT
Start: 2024-05-23 | End: 2024-05-23

## 2024-05-23 RX ADMIN — LIDOCAINE: 40 CREAM TOPICAL at 09:26

## 2024-05-23 NOTE — PROGRESS NOTES
"Patient ID: Asad Galloway is a 64 y.o. male Date of Birth 1960     Chief Complaint  No chief complaint on file.      Allergies  Patient has no known allergies.    Assessment:    No problem-specific Assessment & Plan notes found for this encounter.       Diagnoses and all orders for this visit:    Ulcer of heel, left, with necrosis of muscle (HCC)  -     lidocaine (LMX) 4 % cream  -     Wound cleansing and dressings Diabetic Ulcer Left Heel; Future  -     Wound Procedure Treatment Diabetic Ulcer Left Heel    Type 2 diabetes mellitus with diabetic neuropathy, unspecified whether long term insulin use ([facility])    Other orders  -     Debridement        Debridement   Wound 06/02/23 Diabetic Ulcer Heel Left    Universal Protocol:  Consent: Verbal consent obtained.  Risks and benefits: risks, benefits and alternatives were discussed  Consent given by: patient  Time out: Immediately prior to procedure a \"time out\" was called to verify the correct patient, procedure, equipment, support staff and site/side marked as required.  Timeout called at: 5/16/2024 10:02 AM.  Patient understanding: patient states understanding of the procedure being performed  Patient identity confirmed: verbally with patient    Debridement Details  Performed by: physician  Debridement type: selective  Pain control: lidocaine 4%      Post-debridement measurements  Length (cm): 0.3  Width (cm): 0.5  Depth (cm): 0.3  Percent debrided: 100%  Surface Area (cm^2): 0.15  Area Debrided (cm^2): 0.15  Volume (cm^3): 0.05    Tissue and other material debrided: dermis and epidermis  Devitalized tissue debrided: callus, fibrin and slough  Instrument(s) utilized: blade  Bleeding: small  Hemostasis obtained with: pressure  Procedural pain (0-10): 0  Post-procedural pain: 0   Response to treatment: procedure was tolerated well        Plan:  Reviewed medical records.  Discussed his condition and prognosis.    Wound is stable and healing.  Continue serial " debridement and Dakin solution. Stressed on patient compliance would  be required to help with his condition.    Globoped shoe for off-loading.  Prevalon boot at night.    RA in 1 week.        Wound 06/02/23 Diabetic Ulcer Heel Left (Active)   Wound Image Images linked 05/23/24 0919   Wound Description Slough;Yellow;Pink 05/23/24 0920   Lani-wound Assessment Callus 05/23/24 0920   Wound Length (cm) 0.2 cm 05/23/24 0920   Wound Width (cm) 0.3 cm 05/23/24 0920   Wound Depth (cm) 0.2 cm 05/23/24 0920   Wound Surface Area (cm^2) 0.06 cm^2 05/23/24 0920   Wound Volume (cm^3) 0.012 cm^3 05/23/24 0920   Calculated Wound Volume (cm^3) 0.01 cm^3 05/23/24 0920   Drainage Amount Small 05/23/24 0920   Drainage Description Brown;Serous 05/23/24 0920   Non-staged Wound Description Full thickness 05/23/24 0920   Patient Tolerance Tolerated well 05/23/24 0920   Dressing Status Intact 05/23/24 0920       Wound 06/02/23 Diabetic Ulcer Heel Left (Active)   Date First Assessed/Time First Assessed: 06/02/23 1908   Primary Wound Type: Diabetic Ulcer  Location: Heel  Wound Location Orientation: Left  Dressing Status: Clean;Dry;Intact       [REMOVED] Wound 01/29/23 Abrasion(s) Pretibial Right (Removed)   Resolved Date/Resolved Time: 12/21/23 0834  Date First Assessed/Time First Assessed: 01/29/23 1218   Traumatic Wound Type: Abrasion(s)  Location: Pretibial  Wound Location Orientation: Right  Wound Description (Comments): Scabbed  Wound Outcome: Other...       [REMOVED] Wound 02/01/23 Wrist Anterior;Right (Removed)   Resolved Date: 12/26/23  Date First Assessed/Time First Assessed: 02/01/23 1806   Location: Wrist  Wound Location Orientation: Anterior;Right       [REMOVED] Wound 02/01/23 Skin Tear Abrasion(s) Arm Left;Posterior;Proximal (Removed)   Resolved Date: 12/26/23  Date First Assessed/Time First Assessed: 02/01/23 1930   Primary Wound Type: Skin Tear  Traumatic Wound Type: Abrasion(s)  Location: Arm  Wound Location Orientation:  Left;Posterior;Proximal       [REMOVED] Wound 09/29/23 Groin Right (Removed)   Resolved Date: 12/26/23  Date First Assessed/Time First Assessed: 09/29/23 0957   Location: Groin  Wound Location Orientation: Right  Wound Description (Comments): New Puncture site noted   Incision's 1st Dressing: ADHESIVE SKIN HIGH VISCOSITY EXOFIN ...       [REMOVED] Wound 02/26/24 Groin Left (Removed)   Resolved Date: 03/05/24  Date First Assessed/Time First Assessed: 02/26/24 1410   Location: Groin  Wound Location Orientation: Left  Wound Description (Comments): LEFT GROIN ACCESS SITE, SOFT, DRY, NO HEMATOMA  Incision's 1st Dressing: ADHESIVE SKIN H...       Subjective:          HPI    The patient presents for evaluation of left heel ulcer.  No pain.  No new complaint.  BS under control.        The following portions of the patient's history were reviewed and updated as appropriate: allergies, current medications, past family history, past medical history, past social history, past surgical history, and problem list.      PAST MEDICAL HISTORY:  Past Medical History:   Diagnosis Date    Cerebrovascular accident (CVA) due to thrombosis of left middle cerebral artery (HCC) 07/29/2018    Chronic kidney disease     Coronary artery disease     Diabetes mellitus (HCC)     not on meds    Dialysis patient (HCC)     M-W-F    Fistula     left upper arm for hemodialyis    GERD (gastroesophageal reflux disease)     History of coronary artery stent placement     x3    Hypercholesteremia     Hyperlipidemia     Hypertension     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    Limb alert care status     no BP/IV left arm    Neuropathy     Obesity     Osteomyelitis (HCC)     last assessed 11/4/16-per son not currently    PVC's (premature ventricular contractions)     sees  Cardio    Stroke (HCC)     last weeof July 2018 [facility]    TIA (transient ischemic attack) 10/28/2018    Wears dentures     Wears glasses        PAST SURGICAL  "HISTORY:  Past Surgical History:   Procedure Laterality Date    ABDOMINAL SURGERY      CARDIAC CATHETERIZATION N/A 05/02/2022    Procedure: Cardiac Coronary Angiogram;  Surgeon: Sam Davis MD;  [facility];  Service: Cardiology    CARDIAC CATHETERIZATION N/A 05/02/2022    Procedure: Cardiac pci;  Surgeon: Sam Davis MD;  [facility];  Service: Cardiology    CARDIAC CATHETERIZATION  02/01/2023    Procedure: Cardiac catheterization;  Surgeon: Sam Davis MD;  [facility];  Service: Cardiology    CARDIAC CATHETERIZATION N/A 02/01/2023    Procedure: Cardiac pci;  Surgeon: Sam Davis MD;  [facility];  Service: Cardiology    CARDIAC CATHETERIZATION N/A 02/01/2023    Procedure: Cardiac Coronary Angiogram;  Surgeon: Sam Davis MD;  [facility];  Service: Cardiology    CARDIAC CATHETERIZATION N/A 02/01/2023    Procedure: Cardiac other-IVUS;  Surgeon: Sam Davis MD;  [facility];  Service: Cardiology    CHOLECYSTECTOMY      Percutaneous    COLONOSCOPY      CYSTOSCOPY      HEMODIALYSIS ADULT  1/22/2024    HEMODIALYSIS ADULT  1/24/2024    IR LOWER EXTREMITY ANGIOGRAM  9/29/2023    IR LOWER EXTREMITY ANGIOGRAM  2/26/2024    OTHER SURGICAL HISTORY      \"stimulator to control bowel movements\"    OR ESOPHAGOGASTRODUODENOSCOPY TRANSORAL DIAGNOSTIC N/A 09/27/2016    Procedure: ESOPHAGOGASTRODUODENOSCOPY (EGD);  Surgeon: Adele Rowe MD;  Location: [facility];  Service: Gastroenterology    OR LAPAROSCOPY SURG CHOLECYSTECTOMY N/A 02/29/2016    Procedure: LAPAROSCOPIC CHOLECYSTECTOMY ;  Surgeon: Cliff Roman DO;  Location: [facility];  Service: General    OR SLCTV CATHJ 3RD+ ORD SLCTV ABDL PEL/LXTR BRNCH Left 9/29/2023    Procedure: Left leg angiogram with intervention;  Surgeon: Michelle Galvan MD;  Location: [facility];  Service: Vascular    OR SLCTV CATHJ 3RD+ ORD SLCTV ABDL PEL/LXTR BRNCH Left 2/26/2024    " "Procedure: Left leg angiogram antegrade access, left popliteal angioplasty;  Surgeon: Michelle Galvan MD;  Location: [facility];  Service: Vascular    ROTATOR CUFF REPAIR Right     SPINAL CORD STIMULATOR IMPLANT      \"Medtronic interstim model # 3058- in lower back to control bowel movements-currently turned off-battery is dead\"    TOE AMPUTATION Right 10/28/2016    Procedure: 3RD TOE AMPUTATION ;  Surgeon: Anjel Salas DPM;  Location[facility];  Service:         ALLERGIES:  Patient has no known allergies.    MEDICATIONS:  Current Outpatient Medications   Medication Sig Dispense Refill    aspirin (ECOTRIN LOW STRENGTH) 81 mg EC tablet Take 81 mg by mouth daily Resume on 8/14        atorvastatin (LIPITOR) 40 mg tablet TAKE 1 TABLET BY MOUTH DAILY WITH DINNER 90 tablet 1    benzonatate (TESSALON) 200 MG capsule Take 1 capsule (200 mg total) by mouth 3 (three) times a day as needed for cough (Patient not taking: Reported on 5/16/2024) 20 capsule 0    Blood Glucose Monitoring Suppl (True Metrix Meter) w/Device KIT Use to test blood sugars 3 times daily 1 kit 0    Blood Pressure Monitoring (BLOOD PRESSURE CUFF) MISC Use to check blood pressure before taking blood pressure medication and 1 hour after and follow instructions provided in discharge instructions based on the readings. 1 each 0    divalproex sodium (DEPAKOTE SPRINKLE) 125 MG capsule TAKE 2 CAPSULES (250 MG TOTAL) BY MOUTH EVERY 12 (TWELVE) HOURS 360 capsule 1    glucose blood (True Metrix Blood Glucose Test) test strip Use 1 each 3 (three) times a day Use as instructed 300 strip 1    Lancets MISC Use 3 (three) times a day Use 3 times daily 300 each 0    metoprolol succinate (TOPROL-XL) 50 mg 24 hr tablet TAKE 1 TABLET BY MOUTH TWICE A  tablet 3    prasugrel (EFFIENT) tablet Take 1 tablet (5 mg total) by mouth daily 90 tablet 1    sacubitril-valsartan (Entresto) 24-26 MG TABS Take 1 tablet by mouth in the morning Bedtime 90 tablet 3    " Sevelamer Carbonate 2.4 g PACK VIGOROUSLY MIX CONTENTS OF 1 PACKET IN WATER (IT WILL NOT DISSOLVE) AND DRINK 3 TIMES DAILY WITH MEALS      sodium hypochlorite (DAKIN'S HALF-STRENGTH) external solution Apply moistened gauze with Dakin to the wound daily. 473 mL 1    Vitamin D3 1.25 MG (54497 UT) capsule TAKE 1 CAPSULE BY MOUTH ONE TIME PER WEEK 12 capsule 4     No current facility-administered medications for this visit.       SOCIAL HISTORY:  Social History     Socioeconomic History    Marital status: /Civil Union     Spouse name: None    Number of children: None    Years of education: None    Highest education level: Not asked   Occupational History    Occupation: disabled   Tobacco Use    Smoking status: Never    Smokeless tobacco: Never   Vaping Use    Vaping status: Never Used   Substance and Sexual Activity    Alcohol use: Never    Drug use: No    Sexual activity: Not Currently     Partners: Female     Comment: defer   Other Topics Concern    None   Social History Narrative    Daily caffeine consumption 2-3 servings a day     Social Determinants of Health     Financial Resource Strain: Patient Declined (7/13/2023)    Overall Financial Resource Strain (CARDIA)     Difficulty of Paying Living Expenses: Patient declined   Food Insecurity: No Food Insecurity (1/19/2024)    Hunger Vital Sign     Worried About Running Out of Food in the Last Year: Never true     Ran Out of Food in the Last Year: Never true   Transportation Needs: No Transportation Needs (1/19/2024)    PRAPARE - Transportation     Lack of Transportation (Medical): No     Lack of Transportation (Non-Medical): No   Physical Activity: Not on file   Stress: No Stress Concern Present (1/18/2019)    [employer] [place] [employer] - Occupational Stress Questionnaire     Feeling of Stress : Only a little   Social Connections: Unknown (1/18/2019)    Social Connection and Isolation Panel [NHANES]     Frequency of Communication with Friends and  Family: Not on file     Frequency of Social Gatherings with Friends and Family: Not on file     Attends Rastafarian Services: Not on file     Active Member of Clubs or Organizations: Not on file     Attends Club or Organization Meetings: Not on file     Marital Status:    Intimate Partner Violence: Not At Risk (1/18/2019)    Humiliation, Afraid, Rape, and Kick questionnaire     Fear of Current or Ex-Partner: No     Emotionally Abused: No     Physically Abused: No     Sexually Abused: No   Housing Stability: Low Risk  (1/19/2024)    Housing Stability Vital Sign     Unable to Pay for Housing in the Last Year: No     Number of Places Lived in the Last Year: 1     Unstable Housing in the Last Year: No        Review of Systems   Constitutional:  Negative for chills and fever.   Respiratory:  Negative for cough and shortness of breath.    Cardiovascular:  Negative for chest pain.   Gastrointestinal:  Negative for nausea and vomiting.   Skin:  Positive for wound.   Neurological:  Positive for numbness. Negative for weakness.         Objective:       Wound 06/02/23 Diabetic Ulcer Heel Left (Active)   Wound Image Images linked 05/23/24 0919   Wound Description Slough;Yellow;Pink 05/23/24 0920   Lani-wound Assessment Callus 05/23/24 0920   Wound Length (cm) 0.2 cm 05/23/24 0920   Wound Width (cm) 0.3 cm 05/23/24 0920   Wound Depth (cm) 0.2 cm 05/23/24 0920   Wound Surface Area (cm^2) 0.06 cm^2 05/23/24 0920   Wound Volume (cm^3) 0.012 cm^3 05/23/24 0920   Calculated Wound Volume (cm^3) 0.01 cm^3 05/23/24 0920   Drainage Amount Small 05/23/24 0920   Drainage Description Brown;Serous 05/23/24 0920   Non-staged Wound Description Full thickness 05/23/24 0920   Patient Tolerance Tolerated well 05/23/24 0920   Dressing Status Intact 05/23/24 0920       /66   Pulse 61   Temp (!) 96.6 °F (35.9 °C)   Resp 20     Physical Exam  Vitals and nursing note reviewed.   Constitutional:       General: He is not in acute  distress.     Appearance: He is not toxic-appearing or diaphoretic.   Cardiovascular:      Rate and Rhythm: Normal rate and regular rhythm.      Pulses:           Dorsalis pedis pulses are 1+ on the left side.        Posterior tibial pulses are detected w/ Doppler on the left side.   Pulmonary:      Effort: Pulmonary effort is normal. No respiratory distress.   Musculoskeletal:         General: No signs of injury.      Right foot: No foot drop.      Left foot: No foot drop.   Feet:      Comments: Biphasic PTA left.  Skin:     General: Skin is warm.      Capillary Refill: Capillary refill takes less than 2 seconds.      Coloration: Skin is not cyanotic or mottled.      Findings: No abscess or erythema.      Nails: There is no clubbing.      Comments: DFU noted left heel.   in depth and size.  Mild periwound callus.  No malodor.  Wound does not probe to bone.  No purulence or redness.  No signs of infection.  See wound assessment and photo.     Neurological:      General: No focal deficit present.      Mental Status: He is alert and oriented to person, place, and time.      Cranial Nerves: No cranial nerve deficit.      Sensory: No sensory deficit.      Motor: No weakness.      Coordination: Coordination normal.   Psychiatric:         Mood and Affect: Mood normal.         Behavior: Behavior normal.         Thought Content: Thought content normal.         Judgment: Judgment normal.           Wound Instructions:  Orders Placed This Encounter   Procedures    Wound cleansing and dressings Diabetic Ulcer Left Heel     Wound location: Left heel   Change dressing Daily   The dressing must stay dry and intact. You may shower with dressing protected by cast cover or bag. Or you may sponge bathe. Call wound center or home health nurse if dressing becomes wet.   Apply Zinc to mona wound.   Apply 1/4 strength dakins moistened gauze wet to dry dressing to wound bed.   Cover with ABD.   Secure with rolled gauze and tape.      Continue to wear Globoped shoe when out of bed. When in bed, please keep Prevalon boot on at all times to offload heel.     Standing Status:   Future     Standing Expiration Date:   5/30/2024    Wound Procedure Treatment Diabetic Ulcer Left Heel     This order was created via procedure documentation    Debridement     This order was created via procedure documentation        Diagnosis ICD-10-CM Associated Orders   1. Ulcer of heel, left, with necrosis of muscle ([facility])  L97.423 lidocaine (LMX) 4 % cream     Wound cleansing and dressings Diabetic Ulcer Left Heel     Wound Procedure Treatment Diabetic Ulcer Left Heel      2. Type 2 diabetes mellitus with diabetic neuropathy, unspecified whether long term insulin use ([facility])  E11.40

## 2024-05-23 NOTE — PROGRESS NOTES
Wound Procedure Treatment Diabetic Ulcer Left Heel    Performed by: Charlotte Conde RN  Authorized by: Franklin Church DPM    Associated wounds:   Wound 06/02/23 Diabetic Ulcer Heel Left  Wound cleansed with:  NSS  Applied primary dressing:  Other  Applied secondary dressing:  Gauze  Dressing secured with:  Javi and Tape    Dakins wet to dry dressing to wound bed.

## 2024-05-23 NOTE — PATIENT INSTRUCTIONS
Orders Placed This Encounter   Procedures    Wound cleansing and dressings Diabetic Ulcer Left Heel     Wound location: Left heel   Change dressing Daily   The dressing must stay dry and intact. You may shower with dressing protected by cast cover or bag. Or you may sponge bathe. Call wound center or home health nurse if dressing becomes wet.   Apply Zinc to mona wound.   Apply 1/4 strength dakins moistened gauze wet to dry dressing to wound bed.   Cover with ABD.   Secure with rolled gauze and tape.     Continue to wear Globoped shoe when out of bed. When in bed, please keep Prevalon boot on at all times to offload heel.     Standing Status:   Future     Standing Expiration Date:   5/30/2024

## 2024-05-24 LAB
GLUCOSE SERPL-MCNC: 72 MG/DL (ref 65–140)
GLUCOSE SERPL-MCNC: 86 MG/DL (ref 65–140)

## 2024-05-28 ENCOUNTER — HOME CARE VISIT (OUTPATIENT)
Dept: HOME HEALTH SERVICES | Facility: HOME HEALTHCARE | Age: 64
End: 2024-05-28
Payer: MEDICARE

## 2024-05-28 PROCEDURE — G0299 HHS/HOSPICE OF RN EA 15 MIN: HCPCS

## 2024-05-29 VITALS
TEMPERATURE: 97 F | RESPIRATION RATE: 16 BRPM | DIASTOLIC BLOOD PRESSURE: 62 MMHG | HEART RATE: 66 BPM | OXYGEN SATURATION: 99 % | SYSTOLIC BLOOD PRESSURE: 108 MMHG

## 2024-05-30 ENCOUNTER — OFFICE VISIT (OUTPATIENT)
Dept: WOUND CARE | Facility: HOSPITAL | Age: 64
End: 2024-05-30
Payer: MEDICARE

## 2024-05-30 ENCOUNTER — HOME CARE VISIT (OUTPATIENT)
Dept: HOME HEALTH SERVICES | Facility: HOME HEALTHCARE | Age: 64
End: 2024-05-30
Payer: MEDICARE

## 2024-05-30 VITALS
DIASTOLIC BLOOD PRESSURE: 84 MMHG | RESPIRATION RATE: 18 BRPM | SYSTOLIC BLOOD PRESSURE: 164 MMHG | TEMPERATURE: 96.4 F | HEART RATE: 78 BPM

## 2024-05-30 DIAGNOSIS — L97.423 ULCER OF HEEL, LEFT, WITH NECROSIS OF MUSCLE (HCC): Primary | ICD-10-CM

## 2024-05-30 DIAGNOSIS — I73.9 PERIPHERAL ARTERIAL DISEASE (HCC): ICD-10-CM

## 2024-05-30 DIAGNOSIS — E11.40 TYPE 2 DIABETES MELLITUS WITH DIABETIC NEUROPATHY, UNSPECIFIED WHETHER LONG TERM INSULIN USE (HCC): ICD-10-CM

## 2024-05-30 PROCEDURE — 99213 OFFICE O/P EST LOW 20 MIN: CPT | Performed by: PODIATRIST

## 2024-05-30 PROCEDURE — 99212 OFFICE O/P EST SF 10 MIN: CPT | Performed by: PODIATRIST

## 2024-05-30 NOTE — PROGRESS NOTES
Wound Procedure Treatment Diabetic Ulcer Left Heel    Performed by: Gilma Nice RN  Authorized by: Franklin Church DPM    Associated wounds:   Wound 06/02/23 Diabetic Ulcer Heel Left  Wound cleansed with:  NSS  Applied secondary dressing:  Foam

## 2024-05-30 NOTE — PATIENT INSTRUCTIONS
Orders Placed This Encounter   Procedures    Wound cleansing and dressings Diabetic Ulcer Left Heel     Your wound is very close to healing- there is no drainage- protect the newly fragile growing tissue.   Apply a dry dressing to cushion the area.  Change the dressing as needed for any drainage. Return in 2 weeks to assess the area     Standing Status:   Future     Standing Expiration Date:   6/13/2024

## 2024-05-30 NOTE — PROGRESS NOTES
Patient ID: Asad Galloway is a 64 y.o. male Date of Birth 1960     Chief Complaint  Chief Complaint   Patient presents with    Follow Up Wound Care Visit     Left heel wound       Allergies  Patient has no known allergies.    Assessment:    No problem-specific Assessment & Plan notes found for this encounter.       Diagnoses and all orders for this visit:    Ulcer of heel, left, with necrosis of muscle (HCC)  -     Wound cleansing and dressings Diabetic Ulcer Left Heel; Future  -     Wound Procedure Treatment Diabetic Ulcer Left Heel    Type 2 diabetes mellitus with diabetic neuropathy, unspecified whether long term insulin use (HCC)    Peripheral arterial disease (HCC)        Procedures    Plan:  Reviewed medical records.  Discussed his condition and prognosis.    Wound is covered with fragile epithelium.  Protective dressing daily.  Hold Dakin.  Monitor for any bleeding or drainage.    Globoped shoe for off-loading.  Prevalon boot at night.    RA in 2 weeks.        Wound 06/02/23 Diabetic Ulcer Heel Left (Active)   Enter Oliveros score: Oliveros Grade 2: Deep ulcer extended to ligament, tendon, joint capsule, bone, or deep fascia without abscess or osteomyelitis (OM) 05/30/24 1112   Wound Image Images linked 05/30/24 1112   Wound Description Dry 05/30/24 1112   Lani-wound Assessment Callus 05/30/24 1112   Wound Length (cm) 0.1 cm 05/30/24 1112   Wound Width (cm) 0.1 cm 05/30/24 1112   Wound Depth (cm) 0.1 cm 05/30/24 1112   Wound Surface Area (cm^2) 0.01 cm^2 05/30/24 1112   Wound Volume (cm^3) 0.001 cm^3 05/30/24 1112   Calculated Wound Volume (cm^3) 0 cm^3 05/30/24 1112   Drainage Amount None 05/30/24 1112   Non-staged Wound Description Not applicable 05/30/24 1112   Dressing Status Intact 05/30/24 1112       Wound 06/02/23 Diabetic Ulcer Heel Left (Active)   Date First Assessed/Time First Assessed: 06/02/23 1908   Primary Wound Type: Diabetic Ulcer  Location: Heel  Wound Location Orientation: Left  Dressing  Status: Clean;Dry;Intact       [REMOVED] Wound 01/29/23 Abrasion(s) Pretibial Right (Removed)   Resolved Date/Resolved Time: 12/21/23 0834  Date First Assessed/Time First Assessed: 01/29/23 1218   Traumatic Wound Type: Abrasion(s)  Location: Pretibial  Wound Location Orientation: Right  Wound Description (Comments): Scabbed  Wound Outcome: Other...       [REMOVED] Wound 02/01/23 Wrist Anterior;Right (Removed)   Resolved Date: 12/26/23  Date First Assessed/Time First Assessed: 02/01/23 1806   Location: Wrist  Wound Location Orientation: Anterior;Right       [REMOVED] Wound 02/01/23 Skin Tear Abrasion(s) Arm Left;Posterior;Proximal (Removed)   Resolved Date: 12/26/23  Date First Assessed/Time First Assessed: 02/01/23 1930   Primary Wound Type: Skin Tear  Traumatic Wound Type: Abrasion(s)  Location: Arm  Wound Location Orientation: Left;Posterior;Proximal       [REMOVED] Wound 09/29/23 Groin Right (Removed)   Resolved Date: 12/26/23  Date First Assessed/Time First Assessed: 09/29/23 0957   Location: Groin  Wound Location Orientation: Right  Wound Description (Comments): New Puncture site noted   Incision's 1st Dressing: ADHESIVE SKIN HIGH VISCOSITY EXOFIN ...       [REMOVED] Wound 02/26/24 Groin Left (Removed)   Resolved Date: 03/05/24  Date First Assessed/Time First Assessed: 02/26/24 1410   Location: Groin  Wound Location Orientation: Left  Wound Description (Comments): LEFT GROIN ACCESS SITE, SOFT, DRY, NO HEMATOMA  Incision's 1st Dressing: ADHESIVE SKIN H...       Subjective:          HPI    The patient presents for evaluation of left heel ulcer.  No pain.  No new complaint.  BS under control.        The following portions of the patient's history were reviewed and updated as appropriate: allergies, current medications, past family history, past medical history, past social history, past surgical history, and problem list.      PAST MEDICAL HISTORY:  Past Medical History:   Diagnosis Date    Cerebrovascular  accident (CVA) due to thrombosis of left middle cerebral artery (HCC) 07/29/2018    Chronic kidney disease     Coronary artery disease     Diabetes mellitus (HCC)     not on meds    Dialysis patient (HCC)     M-W-F    Fistula     left upper arm for hemodialyis    GERD (gastroesophageal reflux disease)     History of coronary artery stent placement     x3    Hypercholesteremia     Hyperlipidemia     Hypertension     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    Limb alert care status     no BP/IV left arm    Neuropathy     Obesity     Osteomyelitis (HCC)     last assessed 11/4/16-per son not currently    PVC's (premature ventricular contractions)     sees [facility]    Stroke ([facility])     last weeof July 2018 [facility]    TIA (transient ischemic attack) 10/28/2018    Wears dentures     Wears glasses        PAST SURGICAL HISTORY:  Past Surgical History:   Procedure Laterality Date    ABDOMINAL SURGERY      CARDIAC CATHETERIZATION N/A 05/02/2022    Procedure: Cardiac Coronary Angiogram;  Surgeon: Sam Davis MD;  [facility];  Service: Cardiology    CARDIAC CATHETERIZATION N/A 05/02/2022    Procedure: Cardiac pci;  Surgeon: Sam Davis MD;  [facility];  Service: Cardiology    CARDIAC CATHETERIZATION  02/01/2023    Procedure: Cardiac catheterization;  Surgeon: Sam Davis MD;  [facility];  Service: Cardiology    CARDIAC CATHETERIZATION N/A 02/01/2023    Procedure: Cardiac pci;  Surgeon: Sam Davis MD;  [facility];  Service: Cardiology    CARDIAC CATHETERIZATION N/A 02/01/2023    Procedure: Cardiac Coronary Angiogram;  Surgeon: Sam Dvais MD;  [facility];  Service: Cardiology    CARDIAC CATHETERIZATION N/A 02/01/2023    Procedure: Cardiac other-IVUS;  Surgeon: Sam Davis MD;  [facility];  Service: Cardiology    CHOLECYSTECTOMY      Percutaneous    COLONOSCOPY      CYSTOSCOPY       "HEMODIALYSIS ADULT  1/22/2024    HEMODIALYSIS ADULT  1/24/2024    IR LOWER EXTREMITY ANGIOGRAM  9/29/2023    IR LOWER EXTREMITY ANGIOGRAM  2/26/2024    OTHER SURGICAL HISTORY      \"stimulator to control bowel movements\"    SD ESOPHAGOGASTRODUODENOSCOPY TRANSORAL DIAGNOSTIC N/A 09/27/2016    Procedure: ESOPHAGOGASTRODUODENOSCOPY (EGD);  Surgeon: Adele Rowe MD;  Location: [facility] GI LAB;  Service: Gastroenterology    SD LAPAROSCOPY SURG CHOLECYSTECTOMY N/A 02/29/2016    Procedure: LAPAROSCOPIC CHOLECYSTECTOMY ;  Surgeon: Cliff Roman DO;  Location: [facility];  Service: General    SD SLCTV CATHJ 3RD+ ORD SLCTV ABDL PEL/LXTR BRNCH Left 9/29/2023    Procedure: Left leg angiogram with intervention;  Surgeon: Michelle Galvan MD;  Location: [facility];  Service: Vascular    SD SLCTV CATHJ 3RD+ ORD SLCTV ABDL PEL/LXTR BRNCH Left 2/26/2024    Procedure: Left leg angiogram antegrade access, left popliteal angioplasty;  Surgeon: Michelle Galvan MD;  Location: [facility];  Service: Vascular    ROTATOR CUFF REPAIR Right     SPINAL CORD STIMULATOR IMPLANT      \"Medtronic interstim model # 3058- in lower back to control bowel movements-currently turned off-battery is dead\"    TOE AMPUTATION Right 10/28/2016    Procedure: 3RD TOE AMPUTATION ;  Surgeon: Anjel Salas DPM;  Location[facility];  Service:         ALLERGIES:  Patient has no known allergies.    MEDICATIONS:  Current Outpatient Medications   Medication Sig Dispense Refill    aspirin (ECOTRIN LOW STRENGTH) 81 mg EC tablet Take 81 mg by mouth daily Resume on 8/14        atorvastatin (LIPITOR) 40 mg tablet TAKE 1 TABLET BY MOUTH DAILY WITH DINNER 90 tablet 1    benzonatate (TESSALON) 200 MG capsule Take 1 capsule (200 mg total) by mouth 3 (three) times a day as needed for cough (Patient not taking: Reported on 5/16/2024) 20 capsule 0    Blood Glucose Monitoring Suppl (True Metrix Meter) w/Device KIT Use to test blood sugars 3 times daily 1 kit 0    Blood " Pressure Monitoring (BLOOD PRESSURE CUFF) MISC Use to check blood pressure before taking blood pressure medication and 1 hour after and follow instructions provided in discharge instructions based on the readings. 1 each 0    divalproex sodium (DEPAKOTE SPRINKLE) 125 MG capsule TAKE 2 CAPSULES (250 MG TOTAL) BY MOUTH EVERY 12 (TWELVE) HOURS 360 capsule 1    glucose blood (True Metrix Blood Glucose Test) test strip Use 1 each 3 (three) times a day Use as instructed 300 strip 1    Lancets MISC Use 3 (three) times a day Use 3 times daily 300 each 0    metoprolol succinate (TOPROL-XL) 50 mg 24 hr tablet TAKE 1 TABLET BY MOUTH TWICE A  tablet 3    prasugrel (EFFIENT) tablet Take 1 tablet (5 mg total) by mouth daily 90 tablet 1    sacubitril-valsartan (Entresto) 24-26 MG TABS Take 1 tablet by mouth in the morning Bedtime 90 tablet 3    Sevelamer Carbonate 2.4 g PACK VIGOROUSLY MIX CONTENTS OF 1 PACKET IN WATER (IT WILL NOT DISSOLVE) AND DRINK 3 TIMES DAILY WITH MEALS      sodium hypochlorite (DAKIN'S HALF-STRENGTH) external solution Apply moistened gauze with Dakin to the wound daily. 473 mL 1    Vitamin D3 1.25 MG (46170 UT) capsule TAKE 1 CAPSULE BY MOUTH ONE TIME PER WEEK 12 capsule 4     No current facility-administered medications for this visit.       SOCIAL HISTORY:  Social History     Socioeconomic History    Marital status: /Civil Union     Spouse name: None    Number of children: None    Years of education: None    Highest education level: Not asked   Occupational History    Occupation: disabled   Tobacco Use    Smoking status: Never    Smokeless tobacco: Never   Vaping Use    Vaping status: Never Used   Substance and Sexual Activity    Alcohol use: Never    Drug use: No    Sexual activity: Not Currently     Partners: Female     Comment: defer   Other Topics Concern    None   Social History Narrative    Daily caffeine consumption 2-3 servings a day     Social Determinants of Health     Financial  Resource Strain: Patient Declined (7/13/2023)    Overall Financial Resource Strain (CARDIA)     Difficulty of Paying Living Expenses: Patient declined   Food Insecurity: No Food Insecurity (1/19/2024)    Hunger Vital Sign     Worried About Running Out of Food in the Last Year: Never true     Ran Out of Food in the Last Year: Never true   Transportation Needs: No Transportation Needs (1/19/2024)    PRAPARE - Transportation     Lack of Transportation (Medical): No     Lack of Transportation (Non-Medical): No   Physical Activity: Not on file   Stress: No Stress Concern Present (1/18/2019)    [place] - Occupational Stress Questionnaire     Feeling of Stress : Only a little   Social Connections: Unknown (1/18/2019)    Social Connection and Isolation Panel [NHANES]     Frequency of Communication with Friends and Family: Not on file     Frequency of Social Gatherings with Friends and Family: Not on file     Attends Synagogue Services: Not on file     Active Member of Clubs or Organizations: Not on file     Attends Club or Organization Meetings: Not on file     Marital Status:    Intimate Partner Violence: Not At Risk (1/18/2019)    Humiliation, Afraid, Rape, and Kick questionnaire     Fear of Current or Ex-Partner: No     Emotionally Abused: No     Physically Abused: No     Sexually Abused: No   Housing Stability: Low Risk  (1/19/2024)    Housing Stability Vital Sign     Unable to Pay for Housing in the Last Year: No     Number of Places Lived in the Last Year: 1     Unstable Housing in the Last Year: No        Review of Systems   Constitutional:  Negative for chills and fever.   Respiratory:  Negative for cough and shortness of breath.    Cardiovascular:  Negative for chest pain.   Gastrointestinal:  Negative for nausea and vomiting.   Neurological:  Positive for numbness. Negative for weakness.         Objective:       Wound 06/02/23 Diabetic Ulcer Heel Left (Active)   Enter Oliveros  score: Oliveros Grade 2: Deep ulcer extended to ligament, tendon, joint capsule, bone, or deep fascia without abscess or osteomyelitis (OM) 05/30/24 1112   Wound Image Images linked 05/30/24 1112   Wound Description Dry 05/30/24 1112   Lani-wound Assessment Callus 05/30/24 1112   Wound Length (cm) 0.1 cm 05/30/24 1112   Wound Width (cm) 0.1 cm 05/30/24 1112   Wound Depth (cm) 0.1 cm 05/30/24 1112   Wound Surface Area (cm^2) 0.01 cm^2 05/30/24 1112   Wound Volume (cm^3) 0.001 cm^3 05/30/24 1112   Calculated Wound Volume (cm^3) 0 cm^3 05/30/24 1112   Drainage Amount None 05/30/24 1112   Non-staged Wound Description Not applicable 05/30/24 1112   Dressing Status Intact 05/30/24 1112       /84   Pulse 78   Temp (!) 96.4 °F (35.8 °C)   Resp 18     Physical Exam  Vitals and nursing note reviewed.   Constitutional:       General: He is not in acute distress.     Appearance: He is not toxic-appearing or diaphoretic.   Cardiovascular:      Rate and Rhythm: Normal rate and regular rhythm.      Pulses:           Dorsalis pedis pulses are 1+ on the left side.        Posterior tibial pulses are detected w/ Doppler on the left side.   Pulmonary:      Effort: Pulmonary effort is normal. No respiratory distress.   Musculoskeletal:         General: No signs of injury.      Right foot: No foot drop.      Left foot: No foot drop.   Feet:      Comments: Biphasic PTA left.  Skin:     General: Skin is warm.      Capillary Refill: Capillary refill takes less than 2 seconds.      Coloration: Skin is not cyanotic or mottled.      Findings: No abscess or erythema.      Nails: There is no clubbing.      Comments: Wound looks covered with fragile epithelium left heel.  No drainage.  No redness.  No signs of infection.  See wound assessment and photo.     Neurological:      General: No focal deficit present.      Mental Status: He is alert and oriented to person, place, and time.      Cranial Nerves: No cranial nerve deficit.       Sensory: No sensory deficit.      Motor: No weakness.      Coordination: Coordination normal.   Psychiatric:         Mood and Affect: Mood normal.         Behavior: Behavior normal.         Thought Content: Thought content normal.         Judgment: Judgment normal.           Wound Instructions:  Orders Placed This Encounter   Procedures    Wound cleansing and dressings Diabetic Ulcer Left Heel     Your wound is very close to healing- there is no drainage- protect the newly fragile growing tissue.   Apply a dry dressing to cushion the area.  Change the dressing as needed for any drainage. Return in 2 weeks to assess the area     Standing Status:   Future     Standing Expiration Date:   6/13/2024    Wound Procedure Treatment Diabetic Ulcer Left Heel     This order was created via procedure documentation        Diagnosis ICD-10-CM Associated Orders   1. Ulcer of heel, left, with necrosis of muscle ([facility])  L97.423 Wound cleansing and dressings Diabetic Ulcer Left Heel     Wound Procedure Treatment Diabetic Ulcer Left Heel      2. Type 2 diabetes mellitus with diabetic neuropathy, unspecified whether long term insulin use ([facility])  E11.40       3. Peripheral arterial disease ([facility])  I73.9

## 2024-06-03 ENCOUNTER — HOSPITAL ENCOUNTER (EMERGENCY)
Facility: HOSPITAL | Age: 64
Discharge: HOME | End: 2024-06-03
Attending: EMERGENCY MEDICINE
Payer: MEDICARE

## 2024-06-03 ENCOUNTER — TELEPHONE (OUTPATIENT)
Age: 64
End: 2024-06-03

## 2024-06-03 ENCOUNTER — APPOINTMENT (EMERGENCY)
Dept: RADIOLOGY | Facility: HOSPITAL | Age: 64
End: 2024-06-03
Payer: MEDICARE

## 2024-06-03 VITALS
DIASTOLIC BLOOD PRESSURE: 67 MMHG | OXYGEN SATURATION: 99 % | TEMPERATURE: 98.1 F | RESPIRATION RATE: 19 BRPM | HEART RATE: 67 BPM | SYSTOLIC BLOOD PRESSURE: 153 MMHG

## 2024-06-03 DIAGNOSIS — J40 BRONCHITIS: Primary | ICD-10-CM

## 2024-06-03 LAB
ALBUMIN SERPL BCP-MCNC: 4.4 G/DL (ref 3.5–5)
ALP SERPL-CCNC: 99 U/L (ref 34–104)
ALT SERPL W P-5'-P-CCNC: 9 U/L (ref 7–52)
ANION GAP SERPL CALCULATED.3IONS-SCNC: 11 MMOL/L (ref 4–13)
AST SERPL W P-5'-P-CCNC: 13 U/L (ref 13–39)
BASOPHILS # BLD AUTO: 0.03 THOUSANDS/ÂΜL (ref 0–0.1)
BASOPHILS NFR BLD AUTO: 1 % (ref 0–1)
BILIRUB SERPL-MCNC: 0.79 MG/DL (ref 0.2–1)
BUN SERPL-MCNC: 34 MG/DL (ref 5–25)
CALCIUM SERPL-MCNC: 9.1 MG/DL (ref 8.4–10.2)
CARDIAC TROPONIN I PNL SERPL HS: 62 NG/L
CHLORIDE SERPL-SCNC: 95 MMOL/L (ref 96–108)
CO2 SERPL-SCNC: 31 MMOL/L (ref 21–32)
CREAT SERPL-MCNC: 4.94 MG/DL (ref 0.6–1.3)
EOSINOPHIL # BLD AUTO: 0.18 THOUSAND/ÂΜL (ref 0–0.61)
EOSINOPHIL NFR BLD AUTO: 4 % (ref 0–6)
ERYTHROCYTE [DISTWIDTH] IN BLOOD BY AUTOMATED COUNT: 17.2 % (ref 11.6–15.1)
GFR SERPL CREATININE-BSD FRML MDRD: 11 ML/MIN/1.73SQ M
GLUCOSE SERPL-MCNC: 97 MG/DL (ref 65–140)
HCT VFR BLD AUTO: 37.3 % (ref 36.5–49.3)
HGB BLD-MCNC: 11.6 G/DL (ref 12–17)
IMM GRANULOCYTES # BLD AUTO: 0.02 THOUSAND/UL (ref 0–0.2)
IMM GRANULOCYTES NFR BLD AUTO: 0 % (ref 0–2)
LYMPHOCYTES # BLD AUTO: 0.73 THOUSANDS/ÂΜL (ref 0.6–4.47)
LYMPHOCYTES NFR BLD AUTO: 15 % (ref 14–44)
MCH RBC QN AUTO: 31.5 PG (ref 26.8–34.3)
MCHC RBC AUTO-ENTMCNC: 31.1 G/DL (ref 31.4–37.4)
MCV RBC AUTO: 101 FL (ref 82–98)
MONOCYTES # BLD AUTO: 0.53 THOUSAND/ÂΜL (ref 0.17–1.22)
MONOCYTES NFR BLD AUTO: 11 % (ref 4–12)
NEUTROPHILS # BLD AUTO: 3.45 THOUSANDS/ÂΜL (ref 1.85–7.62)
NEUTS SEG NFR BLD AUTO: 69 % (ref 43–75)
NRBC BLD AUTO-RTO: 0 /100 WBCS
PLATELET # BLD AUTO: 113 THOUSANDS/UL (ref 149–390)
PMV BLD AUTO: 10.7 FL (ref 8.9–12.7)
POTASSIUM SERPL-SCNC: 4.1 MMOL/L (ref 3.5–5.3)
PROT SERPL-MCNC: 7.9 G/DL (ref 6.4–8.4)
RBC # BLD AUTO: 3.68 MILLION/UL (ref 3.88–5.62)
SODIUM SERPL-SCNC: 137 MMOL/L (ref 135–147)
WBC # BLD AUTO: 4.94 THOUSAND/UL (ref 4.31–10.16)

## 2024-06-03 PROCEDURE — 93005 ELECTROCARDIOGRAM TRACING: CPT

## 2024-06-03 PROCEDURE — 99285 EMERGENCY DEPT VISIT HI MDM: CPT | Performed by: EMERGENCY MEDICINE

## 2024-06-03 PROCEDURE — 71046 X-RAY EXAM CHEST 2 VIEWS: CPT

## 2024-06-03 PROCEDURE — 85025 COMPLETE CBC W/AUTO DIFF WBC: CPT | Performed by: EMERGENCY MEDICINE

## 2024-06-03 PROCEDURE — 36415 COLL VENOUS BLD VENIPUNCTURE: CPT | Performed by: EMERGENCY MEDICINE

## 2024-06-03 PROCEDURE — 80053 COMPREHEN METABOLIC PANEL: CPT | Performed by: EMERGENCY MEDICINE

## 2024-06-03 PROCEDURE — 84484 ASSAY OF TROPONIN QUANT: CPT | Performed by: EMERGENCY MEDICINE

## 2024-06-03 PROCEDURE — 99285 EMERGENCY DEPT VISIT HI MDM: CPT

## 2024-06-03 RX ORDER — ALBUTEROL SULFATE 90 UG/1
2 AEROSOL, METERED RESPIRATORY (INHALATION) ONCE
Status: COMPLETED | OUTPATIENT
Start: 2024-06-03 | End: 2024-06-03

## 2024-06-03 RX ORDER — ALBUTEROL SULFATE 90 UG/1
2 AEROSOL, METERED RESPIRATORY (INHALATION) EVERY 6 HOURS PRN
Qty: 18 G | Refills: 0 | Status: SHIPPED | OUTPATIENT
Start: 2024-06-03 | End: 2024-06-17

## 2024-06-03 RX ADMIN — ALBUTEROL SULFATE 2 PUFF: 90 AEROSOL, METERED RESPIRATORY (INHALATION) at 17:32

## 2024-06-03 NOTE — ED ATTENDING ATTESTATION
6/3/2024  I, Jose Rosa MD, saw and evaluated the patient. I have discussed the patient with the resident/non-physician practitioner and agree with the resident's/non-physician practitioner's findings, Plan of Care, and MDM as documented in the resident's/non-physician practitioner's note, except where noted. All available labs and Radiology studies were reviewed.  I was present for key portions of any procedure(s) performed by the resident/non-physician practitioner and I was immediately available to provide assistance.       At this point I agree with the current assessment done in the Emergency Department.  I have conducted an independent evaluation of this patient a history and physical is as follows: Ongoing cough for about a month, called his PCP and was asked to come the emergency department for evaluation.  No chest pain or shortness of breath.  EKG and laboratory studies were ordered in triage.  Patient has no chest pain or shortness of breath.  EKG and troponin similar to previous.  Doubt ACS as patient has no chest pain or shortness of breath.  Chest x-ray with no acute cardiopulmonary is identified on my independent evaluation.  History and physical exam most consistent with bronchitis.  Improved with albuterol inhaler.  He feels much better, comfortable and stable for discharge home.  Return precautions were discussed for development of new or different symptoms.    Results Reviewed       Procedure Component Value Units Date/Time    HS Troponin I 4hr [[id number]]     Lab Status: No result Specimen: Blood     HS Troponin 0hr (reflex protocol) [[id number]]  (Abnormal) Collected: 06/03/24 1657    Lab Status: Final result Specimen: Blood from Arm, Right Updated: 06/03/24 1737     hs TnI 0hr 62 ng/L     HS Troponin I 2hr [[id number]]     Lab Status: No result Specimen: Blood     Comprehensive metabolic panel [[id number]]  (Abnormal) Collected: 06/03/24 1657    Lab Status: Final result Specimen: Blood  from Arm, Right Updated: 06/03/24 1730     Sodium 137 mmol/L      Potassium 4.1 mmol/L      Chloride 95 mmol/L      CO2 31 mmol/L      ANION GAP 11 mmol/L      BUN 34 mg/dL      Creatinine 4.94 mg/dL      Glucose 97 mg/dL      Calcium 9.1 mg/dL      AST 13 U/L      ALT 9 U/L      Alkaline Phosphatase 99 U/L      Total Protein 7.9 g/dL      Albumin 4.4 g/dL      Total Bilirubin 0.79 mg/dL      eGFR 11 ml/min/1.73sq m     Narrative:      National Kidney Disease Foundation guidelines for Chronic Kidney Disease (CKD):     Stage 1 with normal or high GFR (GFR > 90 mL/min/1.73 square meters)    Stage 2 Mild CKD (GFR = 60-89 mL/min/1.73 square meters)    Stage 3A Moderate CKD (GFR = 45-59 mL/min/1.73 square meters)    Stage 3B Moderate CKD (GFR = 30-44 mL/min/1.73 square meters)    Stage 4 Severe CKD (GFR = 15-29 mL/min/1.73 square meters)    Stage 5 End Stage CKD (GFR <15 mL/min/1.73 square meters)  Note: GFR calculation is accurate only with a steady state creatinine    CBC and differential [[id number]]  (Abnormal) Collected: 06/03/24 1657    Lab Status: Final result Specimen: Blood from Arm, Right Updated: 06/03/24 1710     WBC 4.94 Thousand/uL      RBC 3.68 Million/uL      Hemoglobin 11.6 g/dL      Hematocrit 37.3 %       fL      MCH 31.5 pg      MCHC 31.1 g/dL      RDW 17.2 %      MPV 10.7 fL      Platelets 113 Thousands/uL      nRBC 0 /100 WBCs      Segmented % 69 %      Immature Grans % 0 %      Lymphocytes % 15 %      Monocytes % 11 %      Eosinophils Relative 4 %      Basophils Relative 1 %      Absolute Neutrophils 3.45 Thousands/µL      Absolute Immature Grans 0.02 Thousand/uL      Absolute Lymphocytes 0.73 Thousands/µL      Absolute Monocytes 0.53 Thousand/µL      Eosinophils Absolute 0.18 Thousand/µL      Basophils Absolute 0.03 Thousands/µL           XR chest 2 views   ED Interpretation by Julissa Cobian MD (06/03 1804)   No infiltrates             ED Course         Critical Care Time  Procedures

## 2024-06-03 NOTE — TELEPHONE ENCOUNTER
Eliana would like to know if Asad could be seen today 6/3/24 or tomorrow 6/4/24. He has had a cough for over a month and she is concerned. She asked for a call back [phone]

## 2024-06-03 NOTE — ED PROVIDER NOTES
History  Chief Complaint   Patient presents with    Cough     Pt states he has had a non productive cough x 1 month. No change in meds, denies SOB. Denies being around anyone sick.     63 yo M with h/o HTN, HLD, CAD, ESRD on HD MWF, presents today for dry cough ongoing x 1 month.  Denies worsening, just not improving.  Patient is on Entresto.  Denies chest pain, shortness of breath.  No sick contacts.  No fevers or chills.  Was at dialysis today and had persistent coughing so the dialysis nurses sent him over for evaluation.  He was able to complete dialysis treatment without any complications.  Went to  about 2 weeks ago, finished course of Azithromycin, no change in symptoms.       Cough  Associated symptoms: no chest pain, no chills, no ear pain, no fever, no rash, no shortness of breath and no sore throat        Prior to Admission Medications   Prescriptions Last Dose Informant Patient Reported? Taking?   Blood Glucose Monitoring Suppl (True Metrix Meter) w/Device KIT  Child No No   Sig: Use to test blood sugars 3 times daily   Blood Pressure Monitoring (BLOOD PRESSURE CUFF) MISC  Child No No   Sig: Use to check blood pressure before taking blood pressure medication and 1 hour after and follow instructions provided in discharge instructions based on the readings.   Lancets MISC  Child No No   Sig: Use 3 (three) times a day Use 3 times daily   Sevelamer Carbonate 2.4 g PACK  Child Yes No   Sig: VIGOROUSLY MIX CONTENTS OF 1 PACKET IN WATER (IT WILL NOT DISSOLVE) AND DRINK 3 TIMES DAILY WITH MEALS   Vitamin D3 1.25 MG (06622 UT) capsule  Child No No   Sig: TAKE 1 CAPSULE BY MOUTH ONE TIME PER WEEK   aspirin (ECOTRIN LOW STRENGTH) 81 mg EC tablet  Child Yes No   Sig: Take 81 mg by mouth daily Resume on 8/14     atorvastatin (LIPITOR) 40 mg tablet  Child No No   Sig: TAKE 1 TABLET BY MOUTH DAILY WITH DINNER   benzonatate (TESSALON) 200 MG capsule  Child No No   Sig: Take 1 capsule (200 mg total) by mouth 3 (three)  times a day as needed for cough   Patient not taking: Reported on 5/16/2024   divalproex sodium (DEPAKOTE SPRINKLE) 125 MG capsule  Child No No   Sig: TAKE 2 CAPSULES (250 MG TOTAL) BY MOUTH EVERY 12 (TWELVE) HOURS   glucose blood (True Metrix Blood Glucose Test) test strip  Child No No   Sig: Use 1 each 3 (three) times a day Use as instructed   metoprolol succinate (TOPROL-XL) 50 mg 24 hr tablet  Child No No   Sig: TAKE 1 TABLET BY MOUTH TWICE A DAY   prasugrel (EFFIENT) tablet  Child No No   Sig: Take 1 tablet (5 mg total) by mouth daily   sacubitril-valsartan (Entresto) 24-26 MG TABS  Child No No   Sig: Take 1 tablet by mouth in the morning Bedtime   sodium hypochlorite (DAKIN'S HALF-STRENGTH) external solution  Child No No   Sig: Apply moistened gauze with Dakin to the wound daily.      Facility-Administered Medications: None       Past Medical History:   Diagnosis Date    Cerebrovascular accident (CVA) due to thrombosis of left middle cerebral artery (HCC) 07/29/2018    Chronic kidney disease     Coronary artery disease     Diabetes mellitus (HCC)     not on meds    Dialysis patient (HCC)     M-W-F    Fistula     left upper arm for hemodialyis    GERD (gastroesophageal reflux disease)     History of coronary artery stent placement     x3    Hypercholesteremia     Hyperlipidemia     Hypertension     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    Limb alert care status     no BP/IV left arm    Neuropathy     Obesity     Osteomyelitis (HCC)     last assessed 11/4/16-per son not currently    PVC's (premature ventricular contractions)     sees  Cardio    Stroke (HCC)     last we[facility]of July 2018 [facility]    TIA (transient ischemic attack) 10/28/2018    Wears dentures     Wears glasses        Past Surgical History:   Procedure Laterality Date    ABDOMINAL SURGERY      CARDIAC CATHETERIZATION N/A 05/02/2022    Procedure: Cardiac Coronary Angiogram;  Surgeon: Sam Davis MD;  [facility]  [facility];  Service: Cardiology    CARDIAC CATHETERIZATION N/A 05/02/2022    Procedure: Cardiac pci;  Surgeon: Sam Davis MD;  [facility];  Service: Cardiology    CARDIAC CATHETERIZATION  02/01/2023    Procedure: Cardiac catheterization;  Surgeon: Sam Davis MD;  [facility] CARD[facility];  Service: Cardiology    CARDIAC CATHETERIZATION N/A 02/01/2023    Procedure: Cardiac pci;  Surgeon: Sam Davis MD;  [facility];  Service: Cardiology    CARDIAC CATHETERIZATION N/A 02/01/2023    Procedure: Cardiac Coronary Angiogram;  Surgeon: Sam Davis MD;  [facility];  Service: Cardiology    CARDIAC CATHETERIZATION N/A 02/01/2023    Procedure: Cardiac other-IVUS;  Surgeon: Sam Davis MD;  [facility]: [facility]ARD[facility];  Service: Cardiology    CHOLECYSTECTOMY      Percutaneous    COLONOSCOPY      CYSTOSCOPY      HEMODIALYSIS ADULT  1/22/2024    HEMODIALYSIS ADULT  1/24/2024    IR LOWER EXTREMITY ANGIOGRAM  9/29/2023    IR LOWER EXTREMITY ANGIOGRAM  2/26/2024    OTHER SURGICAL HISTORY      \"stimulator to control bowel movements\"    IA ESOPHAGOGASTRODUODENOSCOPY TRANSORAL DIAGNOSTIC N/A 09/27/2016    Procedure: ESOPHAGOGASTRODUODENOSCOPY (EGD);  Surgeon: Adele Rowe MD;  Location: [facility] GI LAB;  Service: Gastroenterology    IA LAPAROSCOPY SURG CHOLECYSTECTOMY N/A 02/29/2016    Procedure: LAPAROSCOPIC CHOLECYSTECTOMY ;  Surgeon: Cliff Roman DO;  Location: [facility] Main OR;  Service: General    IA SLCTV CATHJ 3RD+ ORD SLCTV ABDL PEL/LXTR BRNCH Left 9/29/2023    Procedure: Left leg angiogram with intervention;  Surgeon: Michelle Galvan MD;  Location: [facility] MA[facility];  Service: Vascular    IA SLCTV CATHJ 3RD+ ORD SLCTV ABDL PEL/LXTR BRNCH Left 2/26/2024    Procedure: Left leg angiogram antegrade access, left popliteal angioplasty;  Surgeon: Michelle Galvan MD;  Location: [facility];  Service: Vascular    ROTATOR CUFF REPAIR Right     SPINAL CORD STIMULATOR IMPLANT      " "\"Medtronic interstim model # 3058- in lower back to control bowel movements-currently turned off-battery is dead\"    TOE AMPUTATION Right 10/28/2016    Procedure: 3RD TOE AMPUTATION ;  Surgeon: Anjel Salas DPM;  Location: [facility];  Service:        Family History   Problem Relation Age of Onset    Leukemia Mother     Liver disease Mother     Lung cancer Mother         heavy smoker - 3 ppd    Heart disease Father     Liver disease Father     Diabetes type I Father     Multiple myeloma Sister     Breast cancer Sister     Urolithiasis Family     Alcohol abuse Neg Hx     Depression Neg Hx     Drug abuse Neg Hx     Substance Abuse Neg Hx     Mental illness Neg Hx      I have reviewed and agree with the history as documented.    E-Cigarette/Vaping    E-Cigarette Use Never User      E-Cigarette/Vaping Substances    Nicotine No     THC No     CBD No     Flavoring No     Other No     Unknown No      Social History     Tobacco Use    Smoking status: Never    Smokeless tobacco: Never   Vaping Use    Vaping status: Never Used   Substance Use Topics    Alcohol use: Never    Drug use: No        Review of Systems   Constitutional:  Negative for chills and fever.   HENT:  Negative for ear pain and sore throat.    Eyes:  Negative for pain and visual disturbance.   Respiratory:  Positive for cough. Negative for shortness of breath.    Cardiovascular:  Negative for chest pain and palpitations.   Gastrointestinal:  Negative for abdominal pain and vomiting.   Genitourinary:  Negative for dysuria and hematuria.   Musculoskeletal:  Negative for arthralgias and back pain.   Skin:  Negative for color change and rash.   Neurological:  Negative for seizures and syncope.   All other systems reviewed and are negative.      Physical Exam  ED Triage Vitals [06/03/24 1638]   Temperature Pulse Respirations Blood Pressure SpO2   98.1 °F (36.7 °C) 67 19 153/67 99 %      Temp Source Heart Rate Source Patient Position - Orthostatic VS BP " Location FiO2 (%)   Oral Monitor Sitting Right arm --      Pain Score       --             Orthostatic Vital Signs  Vitals:    06/03/24 1638   BP: 153/67   Pulse: 67   Patient Position - Orthostatic VS: Sitting       Physical Exam  Vitals and nursing note reviewed.   Constitutional:       General: He is not in acute distress.     Appearance: Normal appearance. He is well-developed.   HENT:      Head: Normocephalic and atraumatic.   Eyes:      Conjunctiva/sclera: Conjunctivae normal.   Cardiovascular:      Rate and Rhythm: Normal rate and regular rhythm.      Heart sounds: No murmur heard.  Pulmonary:      Effort: Pulmonary effort is normal. No respiratory distress.      Breath sounds: Normal breath sounds.      Comments: Intermittent dry coughing throughout encounter  Abdominal:      Palpations: Abdomen is soft.      Tenderness: There is no abdominal tenderness.   Musculoskeletal:         General: No swelling.      Cervical back: Neck supple.   Skin:     General: Skin is warm and dry.      Capillary Refill: Capillary refill takes less than 2 seconds.      Comments: Dialysis fistula to left upper extremity   Neurological:      Mental Status: He is alert.         ED Medications  Medications   albuterol (PROVENTIL HFA,VENTOLIN HFA) inhaler 2 puff (2 puffs Inhalation Given 6/3/24 1732)       Diagnostic Studies  Results Reviewed       Procedure Component Value Units Date/Time    HS Troponin 0hr (reflex protocol) [[id number]]  (Abnormal) Collected: 06/03/24 1657    Lab Status: Final result Specimen: Blood from Arm, Right Updated: 06/03/24 1737     hs TnI 0hr 62 ng/L     Comprehensive metabolic panel [[id number]]  (Abnormal) Collected: 06/03/24 1657    Lab Status: Final result Specimen: Blood from Arm, Right Updated: 06/03/24 1730     Sodium 137 mmol/L      Potassium 4.1 mmol/L      Chloride 95 mmol/L      CO2 31 mmol/L      ANION GAP 11 mmol/L      BUN 34 mg/dL      Creatinine 4.94 mg/dL      Glucose 97 mg/dL      Calcium  9.1 mg/dL      AST 13 U/L      ALT 9 U/L      Alkaline Phosphatase 99 U/L      Total Protein 7.9 g/dL      Albumin 4.4 g/dL      Total Bilirubin 0.79 mg/dL      eGFR 11 ml/min/1.73sq m     Narrative:      National Kidney Disease Foundation guidelines for Chronic Kidney Disease (CKD):     Stage 1 with normal or high GFR (GFR > 90 mL/min/1.73 square meters)    Stage 2 Mild CKD (GFR = 60-89 mL/min/1.73 square meters)    Stage 3A Moderate CKD (GFR = 45-59 mL/min/1.73 square meters)    Stage 3B Moderate CKD (GFR = 30-44 mL/min/1.73 square meters)    Stage 4 Severe CKD (GFR = 15-29 mL/min/1.73 square meters)    Stage 5 End Stage CKD (GFR <15 mL/min/1.73 square meters)  Note: GFR calculation is accurate only with a steady state creatinine    CBC and differential [[id number]]  (Abnormal) Collected: 06/03/24 1657    Lab Status: Final result Specimen: Blood from Arm, Right Updated: 06/03/24 1710     WBC 4.94 Thousand/uL      RBC 3.68 Million/uL      Hemoglobin 11.6 g/dL      Hematocrit 37.3 %       fL      MCH 31.5 pg      MCHC 31.1 g/dL      RDW 17.2 %      MPV 10.7 fL      Platelets 113 Thousands/uL      nRBC 0 /100 WBCs      Segmented % 69 %      Immature Grans % 0 %      Lymphocytes % 15 %      Monocytes % 11 %      Eosinophils Relative 4 %      Basophils Relative 1 %      Absolute Neutrophils 3.45 Thousands/µL      Absolute Immature Grans 0.02 Thousand/uL      Absolute Lymphocytes 0.73 Thousands/µL      Absolute Monocytes 0.53 Thousand/µL      Eosinophils Absolute 0.18 Thousand/µL      Basophils Absolute 0.03 Thousands/µL                    XR chest 2 views   ED Interpretation by Julissa Cobian MD (06/03 1804)   No infiltrates       Final Result by Mian Singer MD (06/04 0830)      No acute cardiopulmonary disease.            Workstation performed: [id number]               Procedures  Procedures      ED Course                                       Medical Decision Making  63 yo M with h/o HTN, HLD, CAD,  ESRD on HD MWF, presents today for dry cough ongoing x 1 month.  Patient asked several times and continues to deny any chest pain or shortness of breath.  Chest x-ray was negative for acute pathology.  He did have significant symptomatic improvement following use of albuterol inhaler.  Recommend he continue this use at home for treatment of acute bronchitis.  Labs were initially sent from triage, which included a troponin.  Suspect this troponin elevation is secondary to ESRD, as this appears to be his baseline troponin on prior labs.  EKG unchanged from prior.  Patient again continues to deny any chest pain, shortness of breath or symptoms concerning for ischemia.  Reviewed very close return precautions and patient and wife both feel comfortable with discharge home.    Amount and/or Complexity of Data Reviewed  Labs: ordered.  Radiology: ordered and independent interpretation performed.    Risk  Prescription drug management.          Disposition  Final diagnoses:   Bronchitis     Time reflects when diagnosis was documented in both MDM as applicable and the Disposition within this note       Time User Action Codes Description Comment    6/3/2024  6:04 PM Julissa Cobian Add [J40] Bronchitis           ED Disposition       ED Disposition   Discharge    Condition   Stable    Date/Time   Mon Misael 3, 2024  6:04 PM    Comment   Asad Galloway discharge to home/self care.                   Follow-up Information       Follow up With Specialties Details Why Contact Info Additional Information    Raj Auguste, DO Internal Medicine Schedule an appointment as soon as possible for a visit   [address] [place] [address] [phone]       [facility] Emergency Medicine  As needed, If symptoms worsen [facility] [id number] [phone] [facility], [facility] [place], [place], [address]             Discharge Medication List as of 6/3/2024  6:05 PM        CONTINUE these medications which have NOT CHANGED    Details   aspirin (ECOTRIN LOW STRENGTH) 81 mg EC tablet Take 81 mg by mouth daily Resume on 8/14  , Historical Med      atorvastatin (LIPITOR) 40 mg tablet TAKE 1 TABLET BY MOUTH DAILY WITH DINNER, Starting Thu 2/8/2024, Normal      benzonatate (TESSALON) 200 MG capsule Take 1 capsule (200 mg total) by mouth 3 (three) times a day as needed for cough, Starting Tue 5/14/2024, Normal      Blood Glucose Monitoring Suppl (True Metrix Meter) w/Device KIT Use to test blood sugars 3 times daily, Normal      Blood Pressure Monitoring (BLOOD PRESSURE CUFF) MISC Use to check blood pressure before taking blood pressure medication and 1 hour after and follow instructions provided in discharge instructions based on the readings., Print      divalproex sodium (DEPAKOTE SPRINKLE) 125 MG capsule TAKE 2 CAPSULES (250 MG TOTAL) BY MOUTH EVERY 12 (TWELVE) HOURS, Starting Mon 2/12/2024, Normal      glucose blood (True Metrix Blood Glucose Test) test strip Use 1 each 3 (three) times a day Use as instructed, Starting Tue 1/9/2024, Normal      Lancets MISC Use 3 (three) times a day Use 3 times daily, Starting Tue 1/9/2024, Normal      metoprolol succinate (TOPROL-XL) 50 mg 24 hr tablet TAKE 1 TABLET BY MOUTH TWICE A DAY, Starting Mon 3/4/2024, Normal      prasugrel (EFFIENT) tablet Take 1 tablet (5 mg total) by mouth daily, Starting Fri 5/3/2024, Normal      sacubitril-valsartan (Entresto) 24-26 MG TABS Take 1 tablet by mouth in the morning Bedtime, Starting Tue 11/21/2023, Normal      Sevelamer Carbonate 2.4 g PACK VIGOROUSLY MIX CONTENTS OF 1 PACKET IN WATER (IT WILL NOT DISSOLVE) AND DRINK 3 TIMES DAILY WITH MEALS, Historical Med      sodium hypochlorite (DAKIN'S HALF-STRENGTH) external solution Apply moistened gauze with Dakin to the wound daily., Normal      Vitamin D3 1.25 MG (17517 UT) capsule TAKE 1 CAPSULE BY MOUTH  ONE TIME PER WEEK, Normal           No discharge procedures on file.    PDMP Review         Value Time User    PDMP Reviewed  Yes 12/26/2023  3:25 AM Awais Machado MD             ED Provider  Attending physically available and evaluated Asadester Galloway. I managed the patient along with the ED Attending.    Electronically Signed by           Julissa Cobian MD  06/04/24 3438

## 2024-06-04 ENCOUNTER — HOME CARE VISIT (OUTPATIENT)
Dept: HOME HEALTH SERVICES | Facility: HOME HEALTHCARE | Age: 64
End: 2024-06-04
Payer: MEDICARE

## 2024-06-04 ENCOUNTER — VBI (OUTPATIENT)
Dept: INTERNAL MEDICINE CLINIC | Facility: CLINIC | Age: 64
End: 2024-06-04

## 2024-06-04 VITALS
OXYGEN SATURATION: 100 % | HEART RATE: 89 BPM | SYSTOLIC BLOOD PRESSURE: 122 MMHG | DIASTOLIC BLOOD PRESSURE: 66 MMHG | RESPIRATION RATE: 16 BRPM | TEMPERATURE: 97.1 F

## 2024-06-04 PROCEDURE — G0299 HHS/HOSPICE OF RN EA 15 MIN: HCPCS

## 2024-06-04 NOTE — TELEPHONE ENCOUNTER
06/04/24 2:11 PM    Patient contacted post ED visit, first outreach attempt made. Message was left for patient to return a call to the [facility] Department at [facility]: Phone [phone].    Thank you.  Bebe Barger MA  [facility]

## 2024-06-05 ENCOUNTER — TELEPHONE (OUTPATIENT)
Age: 64
End: 2024-06-05

## 2024-06-05 ENCOUNTER — NURSE TRIAGE (OUTPATIENT)
Age: 64
End: 2024-06-05

## 2024-06-05 DIAGNOSIS — Z71.89 COMPLEX CARE COORDINATION: Primary | ICD-10-CM

## 2024-06-05 LAB
ATRIAL RATE: 74 BPM
PR INTERVAL: 166 MS
QRS AXIS: 219 DEGREES
QRSD INTERVAL: 166 MS
QT INTERVAL: 506 MS
QTC INTERVAL: 561 MS
T WAVE AXIS: 76 DEGREES
VENTRICULAR RATE: 74 BPM

## 2024-06-05 PROCEDURE — 93010 ELECTROCARDIOGRAM REPORT: CPT | Performed by: INTERNAL MEDICINE

## 2024-06-05 NOTE — TELEPHONE ENCOUNTER
"Patients wife calling. Asad has been having episodes of low blood sugars over the past week. When it drops below 100 she gives him orange juice and it goes back up. At time of the call his BG was 105 with no symptoms. Patients on dialysis and no longer takes any medication for diabetes. Patients wife requesting call back from the office.   Please advise                Reason for Disposition   Blood glucose < 70 mg/dL (3.9 mmol/L) or symptomatic AND has other adult present    Answer Assessment - Initial Assessment Questions  1. SYMPTOMS: \"What symptoms are you concerned about?\"      Weak and sweaty  2. ONSET:  \"When did the symptoms start?\"      When BG drops  3. BLOOD GLUCOSE: \"What is your blood glucose level?\"       105  4. USUAL RANGE: \"What is your blood glucose level usually?\" (e.g., usual fasting morning value, usual evening value)      115-120  5. TYPE 1 or 2:  \"Do you know what type of diabetes you have?\"  (e.g., Type 1, Type 2, Gestational; doesn't know)       Type 2  6. INSULIN: \"Do you take insulin?\" \"What type of insulin(s) do you use? What is the mode of delivery? (syringe, pen; injection or pump) \"When did you last give yourself an insulin dose?\" (i.e., time or hours/minutes ago) \"How much did you give?\" (i.e., how many units)      denies  7. DIABETES PILLS: \"Do you take any pills for your diabetes?\"      denies  8. OTHER SYMPTOMS: \"Do you have any symptoms?\" (e.g., fever, frequent urination, difficulty breathing, vomiting)      denies  9. LOW BLOOD GLUCOSE TREATMENT: \"What have you done so far to treat the low blood glucose level?\"      Orange when it drops below 100  10. FOOD: \"When did you last eat or drink?\"        10:30AM  11. ALONE: \"Are you alone right now or is someone with you?\"         Denies    Protocols used: Diabetes - Low Blood Sugar-ADULT-[place]    "

## 2024-06-05 NOTE — TELEPHONE ENCOUNTER
A true low blood glucose level is one below 70 mg/dl. It is unlikely to experience hypoglycemia if he is currently not being treated with diabetes. Has he has any blood glucose levels below 70 mg/dl recently?    If he has not had any low BG levels below 70 mg/dl, I would recommend that he eats 3 healthy meals and has 2-3 snacks per day to prevent BG levels from trending down to low normal levels

## 2024-06-05 NOTE — TELEPHONE ENCOUNTER
Phone call from patient's wife - patient's blood sugar is dropping - transferred to Triage Nurse for further discussion.

## 2024-06-06 ENCOUNTER — PATIENT OUTREACH (OUTPATIENT)
Dept: CASE MANAGEMENT | Facility: OTHER | Age: 64
End: 2024-06-06

## 2024-06-06 NOTE — PROGRESS NOTES
Outpatient Care Management Note:  In basket referral received after recent ER visit.  Chart review completed. Outreach call scheduled.   
negative...

## 2024-06-07 ENCOUNTER — PATIENT OUTREACH (OUTPATIENT)
Dept: CASE MANAGEMENT | Facility: OTHER | Age: 64
End: 2024-06-07

## 2024-06-07 NOTE — PROGRESS NOTES
Outpatient Care Management Note:  Outreach call attempted to Mr. Galloway. Message left for patient to please return call.  Contact information left on message.

## 2024-06-10 ENCOUNTER — PATIENT OUTREACH (OUTPATIENT)
Dept: CASE MANAGEMENT | Facility: OTHER | Age: 64
End: 2024-06-10

## 2024-06-10 NOTE — PROGRESS NOTES
Outpatient Care Management Note.  Outreach call placed to Mr. Galloway.  Introduced myself and my role.     Current Symptoms:  Mr. Galloway states cough has mostly cleared up.     Medications Obtained:  Inhaler was prescribed in ER.  Mr. Galloway was able to obtain the medication and is using it as prescribed.     Follow up Appointments:  Has regularly scheduled appointment with PCP in July.  As symptoms are resolving he does not feel he needs to be seen sooner.  Attends HD three times a week so is checked on frequently.     Transportation Issues:  Denies any issued with transportation.      Any Assistance Required:  Mr. Galloway has no needs at this time.

## 2024-06-13 ENCOUNTER — TELEPHONE (OUTPATIENT)
Age: 64
End: 2024-06-13

## 2024-06-13 ENCOUNTER — OFFICE VISIT (OUTPATIENT)
Dept: WOUND CARE | Facility: HOSPITAL | Age: 64
End: 2024-06-13
Payer: MEDICARE

## 2024-06-13 VITALS
DIASTOLIC BLOOD PRESSURE: 70 MMHG | RESPIRATION RATE: 18 BRPM | HEART RATE: 71 BPM | SYSTOLIC BLOOD PRESSURE: 158 MMHG | TEMPERATURE: 96.6 F

## 2024-06-13 DIAGNOSIS — E11.40 TYPE 2 DIABETES MELLITUS WITH DIABETIC NEUROPATHY, UNSPECIFIED WHETHER LONG TERM INSULIN USE (HCC): ICD-10-CM

## 2024-06-13 DIAGNOSIS — L97.421 ULCER OF LEFT HEEL, LIMITED TO BREAKDOWN OF SKIN (HCC): Primary | ICD-10-CM

## 2024-06-13 DIAGNOSIS — I73.9 PERIPHERAL ARTERIAL DISEASE (HCC): ICD-10-CM

## 2024-06-13 PROCEDURE — 99212 OFFICE O/P EST SF 10 MIN: CPT | Performed by: PODIATRIST

## 2024-06-13 NOTE — PATIENT INSTRUCTIONS
Orders Placed This Encounter   Procedures    Wound cleansing and dressings Diabetic Ulcer Left Heel     Left heel is Healed!  There is no drainage- To stay healed protect the newly fragile growing tissue.     Apply a bordered foam to cushion the area. The bordered foam is good for 3 days and you can remove it and re-apply for 3 days.  Change the dressing as needed for any drainage    Continue to wear your offloading shoe until you get new prescription for shoes    Follow up with Dr. Church for routine nail care and prescription shoes in his podiatry office.     Look for Mepilex Border Flex or any other Self-adherent soft silicone bordered foam dressing on [employer]     Standing Status:   Future     Standing Expiration Date:   6/13/2024

## 2024-06-13 NOTE — PROGRESS NOTES
Patient ID: Asad Galloway is a 64 y.o. male Date of Birth 1960     Chief Complaint  Chief Complaint   Patient presents with    Follow Up Wound Care Visit     L heel       Allergies  Patient has no known allergies.    Assessment:    No problem-specific Assessment & Plan notes found for this encounter.       Diagnoses and all orders for this visit:    Ulcer of left heel, limited to breakdown of skin (HCC)  -     Wound cleansing and dressings Diabetic Ulcer Left Heel; Future  -     Wound Procedure Treatment Diabetic Ulcer Left Heel  -     Diabetic Shoe Inserts  -     Diabetic Shoe    Type 2 diabetes mellitus with diabetic neuropathy, unspecified whether long term insulin use (HCC)  -     Diabetic Shoe Inserts  -     Diabetic Shoe    Peripheral arterial disease (HCC)  -     Diabetic Shoe Inserts  -     Diabetic Shoe        Procedures    Plan:  Reviewed medical records.  Discussed his condition and prognosis.    Wound is healed at this time.  Continue protective dressing daily.  Monitor for any bleeding or drainage.    Globoped shoe for off-loading.  Prevalon boot at night.  Sent him for DM shoes and inserts.  D/C from wound center.  Pt to follow-up at my office in 3 weeks.          Wound 06/02/23 Diabetic Ulcer Heel Left (Active)   Enter Oliveros score: Oliveros Grade 2: Deep ulcer extended to ligament, tendon, joint capsule, bone, or deep fascia without abscess or osteomyelitis (OM) 06/13/24 0902   Wound Image Images linked 06/13/24 0900   Wound Description Yellow;Brown 06/13/24 0902   Lani-wound Assessment Callus 06/13/24 0902   Wound Length (cm) 0 cm 06/13/24 0902   Wound Width (cm) 0 cm 06/13/24 0902   Wound Depth (cm) 0 cm 06/13/24 0902   Wound Surface Area (cm^2) 0 cm^2 06/13/24 0902   Wound Volume (cm^3) 0 cm^3 06/13/24 0902   Calculated Wound Volume (cm^3) 0 cm^3 06/13/24 0902   Drainage Amount None 06/13/24 0902   Non-staged Wound Description Not applicable 06/13/24 0902   Treatments Cleansed 06/13/24 0902        Wound 06/02/23 Diabetic Ulcer Heel Left (Active)   Date First Assessed/Time First Assessed: 06/02/23 1908   Primary Wound Type: Diabetic Ulcer  Location: Heel  Wound Location Orientation: Left  Dressing Status: Clean;Dry;Intact  Wound Outcome: Healed       [REMOVED] Wound 01/29/23 Abrasion(s) Pretibial Right (Removed)   Resolved Date/Resolved Time: 12/21/23 0834  Date First Assessed/Time First Assessed: 01/29/23 1218   Traumatic Wound Type: Abrasion(s)  Location: Pretibial  Wound Location Orientation: Right  Wound Description (Comments): Scabbed  Wound Outcome: Other...       [REMOVED] Wound 02/01/23 Wrist Anterior;Right (Removed)   Resolved Date: 12/26/23  Date First Assessed/Time First Assessed: 02/01/23 1806   Location: Wrist  Wound Location Orientation: Anterior;Right       [REMOVED] Wound 02/01/23 Skin Tear Abrasion(s) Arm Left;Posterior;Proximal (Removed)   Resolved Date: 12/26/23  Date First Assessed/Time First Assessed: 02/01/23 1930   Primary Wound Type: Skin Tear  Traumatic Wound Type: Abrasion(s)  Location: Arm  Wound Location Orientation: Left;Posterior;Proximal       [REMOVED] Wound 09/29/23 Groin Right (Removed)   Resolved Date: 12/26/23  Date First Assessed/Time First Assessed: 09/29/23 0957   Location: Groin  Wound Location Orientation: Right  Wound Description (Comments): New Puncture site noted   Incision's 1st Dressing: ADHESIVE SKIN HIGH VISCOSITY EXOFIN ...       [REMOVED] Wound 02/26/24 Groin Left (Removed)   Resolved Date: 03/05/24  Date First Assessed/Time First Assessed: 02/26/24 1410   Location: Groin  Wound Location Orientation: Left  Wound Description (Comments): LEFT GROIN ACCESS SITE, SOFT, DRY, NO HEMATOMA  Incision's 1st Dressing: ADHESIVE SKIN H...       Subjective:          HPI    The patient presents for evaluation of left heel ulcer.  No pain.  No new complaint.  BS under control.        The following portions of the patient's history were reviewed and updated as  appropriate: allergies, current medications, past family history, past medical history, past social history, past surgical history, and problem list.      PAST MEDICAL HISTORY:  Past Medical History:   Diagnosis Date    Cerebrovascular accident (CVA) due to thrombosis of left middle cerebral artery (HCC) 07/29/2018    Chronic kidney disease     Coronary artery disease     Diabetes mellitus (HCC)     not on meds    Dialysis patient ([facility])     M-W-F    Fistula     left upper arm for hemodialyis    GERD (gastroesophageal reflux disease)     History of coronary artery stent placement     x3    Hypercholesteremia     Hyperlipidemia     Hypertension     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    Limb alert care status     no BP/IV left arm    Neuropathy     Obesity     Osteomyelitis (HCC)     last assessed 11/4/16-per son not currently    PVC's (premature ventricular contractions)     sees [facility]    Stroke ([facility])     last weeof July 2018 [facility]    TIA (transient ischemic attack) 10/28/2018    Wears dentures     Wears glasses        PAST SURGICAL HISTORY:  Past Surgical History:   Procedure Laterality Date    ABDOMINAL SURGERY      CARDIAC CATHETERIZATION N/A 05/02/2022    Procedure: Cardiac Coronary Angiogram;  Surgeon: Sam Davis MD;  [facility];  Service: Cardiology    CARDIAC CATHETERIZATION N/A 05/02/2022    Procedure: Cardiac pci;  Surgeon: Sam Davis MD;  [facility];  Service: Cardiology    CARDIAC CATHETERIZATION  02/01/2023    Procedure: Cardiac catheterization;  Surgeon: Sam Davis MD;  [facility];  Service: Cardiology    CARDIAC CATHETERIZATION N/A 02/01/2023    Procedure: Cardiac pci;  Surgeon: Sam Davis MD;  [facility];  Service: Cardiology    CARDIAC CATHETERIZATION N/A 02/01/2023    Procedure: Cardiac Coronary Angiogram;  Surgeon: Sam Davis MD;  [facility];  Service: Cardiology     "CARDIAC CATHETERIZATION N/A 02/01/2023    Procedure: Cardiac other-IVUS;  Surgeon: Sam Davis MD;  [facility];  Service: Cardiology    CHOLECYSTECTOMY      Percutaneous    COLONOSCOPY      CYSTOSCOPY      HEMODIALYSIS ADULT  1/22/2024    HEMODIALYSIS ADULT  1/24/2024    IR LOWER EXTREMITY ANGIOGRAM  9/29/2023    IR LOWER EXTREMITY ANGIOGRAM  2/26/2024    OTHER SURGICAL HISTORY      \"stimulator to control bowel movements\"    MN ESOPHAGOGASTRODUODENOSCOPY TRANSORAL DIAGNOSTIC N/A 09/27/2016    Procedure: ESOPHAGOGASTRODUODENOSCOPY (EGD);  Surgeon: Adele Rowe MD;  Location: [facility];  Service: Gastroenterology    MN LAPAROSCOPY SURG CHOLECYSTECTOMY N/A 02/29/2016    Procedure: LAPAROSCOPIC CHOLECYSTECTOMY ;  Surgeon: Cliff Roman DO;  Location: [facility];  Service: General    MN SLCTV CATHJ 3RD+ ORD SLCTV ABDL PEL/LXTR BRNCH Left 9/29/2023    Procedure: Left leg angiogram with intervention;  Surgeon: Michelle Galvan MD;  Location: [facility];  Service: Vascular    MN SLCTV CATHJ 3RD+ ORD SLCTV ABDL PEL/LXTR BRNCH Left 2/26/2024    Procedure: Left leg angiogram antegrade access, left popliteal angioplasty;  Surgeon: Michelle Galvan MD;  Location[facility];  Service: Vascular    ROTATOR CUFF REPAIR Right     SPINAL CORD STIMULATOR IMPLANT      \"Medtronic interstim model # 3058- in lower back to control bowel movements-currently turned off-battery is dead\"    TOE AMPUTATION Right 10/28/2016    Procedure: 3RD TOE AMPUTATION ;  Surgeon: Anjel Salas DPM;  Location[facility];  Service:         ALLERGIES:  Patient has no known allergies.    MEDICATIONS:  Current Outpatient Medications   Medication Sig Dispense Refill    albuterol (PROVENTIL HFA,VENTOLIN HFA) 90 mcg/act inhaler Inhale 2 puffs every 6 (six) hours as needed for wheezing or shortness of breath for up to 14 days 18 g 0    aspirin (ECOTRIN LOW STRENGTH) 81 mg EC tablet Take 81 mg by mouth daily Resume on 8/14     "    atorvastatin (LIPITOR) 40 mg tablet TAKE 1 TABLET BY MOUTH DAILY WITH DINNER 90 tablet 1    benzonatate (TESSALON) 200 MG capsule Take 1 capsule (200 mg total) by mouth 3 (three) times a day as needed for cough (Patient not taking: Reported on 5/16/2024) 20 capsule 0    Blood Glucose Monitoring Suppl (True Metrix Meter) w/Device KIT Use to test blood sugars 3 times daily 1 kit 0    Blood Pressure Monitoring (BLOOD PRESSURE CUFF) MISC Use to check blood pressure before taking blood pressure medication and 1 hour after and follow instructions provided in discharge instructions based on the readings. 1 each 0    divalproex sodium (DEPAKOTE SPRINKLE) 125 MG capsule TAKE 2 CAPSULES (250 MG TOTAL) BY MOUTH EVERY 12 (TWELVE) HOURS 360 capsule 1    glucose blood (True Metrix Blood Glucose Test) test strip Use 1 each 3 (three) times a day Use as instructed 300 strip 1    Lancets MISC Use 3 (three) times a day Use 3 times daily 300 each 0    metoprolol succinate (TOPROL-XL) 50 mg 24 hr tablet TAKE 1 TABLET BY MOUTH TWICE A  tablet 3    prasugrel (EFFIENT) tablet Take 1 tablet (5 mg total) by mouth daily 90 tablet 1    sacubitril-valsartan (Entresto) 24-26 MG TABS Take 1 tablet by mouth in the morning Bedtime 90 tablet 3    Sevelamer Carbonate 2.4 g PACK VIGOROUSLY MIX CONTENTS OF 1 PACKET IN WATER (IT WILL NOT DISSOLVE) AND DRINK 3 TIMES DAILY WITH MEALS      sodium hypochlorite (DAKIN'S HALF-STRENGTH) external solution Apply moistened gauze with Dakin to the wound daily. 473 mL 1    Vitamin D3 1.25 MG (54604 UT) capsule TAKE 1 CAPSULE BY MOUTH ONE TIME PER WEEK 12 capsule 4     No current facility-administered medications for this visit.       SOCIAL HISTORY:  Social History     Socioeconomic History    Marital status: /Civil Union     Spouse name: None    Number of children: None    Years of education: None    Highest education level: Not asked   Occupational History    Occupation: disabled   Tobacco Use     Smoking status: Never    Smokeless tobacco: Never   Vaping Use    Vaping status: Never Used   Substance and Sexual Activity    Alcohol use: Never    Drug use: No    Sexual activity: Not Currently     Partners: Female     Comment: defer   Other Topics Concern    None   Social History Narrative    Daily caffeine consumption 2-3 servings a day     Social Determinants of Health     Financial Resource Strain: Patient Declined (7/13/2023)    Overall Financial Resource Strain (CARDIA)     Difficulty of Paying Living Expenses: Patient declined   Food Insecurity: No Food Insecurity (1/19/2024)    Hunger Vital Sign     Worried About Running Out of Food in the Last Year: Never true     Ran Out of Food in the Last Year: Never true   Transportation Needs: No Transportation Needs (1/19/2024)    PRAPARE - Transportation     Lack of Transportation (Medical): No     Lack of Transportation (Non-Medical): No   Physical Activity: Not on file   Stress: No Stress Concern Present (1/18/2019)    [place] [employer] - Occupational Stress Questionnaire     Feeling of Stress : Only a little   Social Connections: Unknown (1/18/2019)    Social Connection and Isolation Panel [NHANES]     Frequency of Communication with Friends and Family: Not on file     Frequency of Social Gatherings with Friends and Family: Not on file     Attends Jehovah's witness Services: Not on file     Active Member of Clubs or Organizations: Not on file     Attends Club or Organization Meetings: Not on file     Marital Status:    Intimate Partner Violence: Not At Risk (1/18/2019)    Humiliation, Afraid, Rape, and Kick questionnaire     Fear of Current or Ex-Partner: No     Emotionally Abused: No     Physically Abused: No     Sexually Abused: No   Housing Stability: Low Risk  (1/19/2024)    Housing Stability Vital Sign     Unable to Pay for Housing in the Last Year: No     Number of Times Moved in the Last Year: 1     Homeless in the Last Year: No         Review of Systems   Constitutional:  Negative for chills and fever.   Respiratory:  Negative for cough and shortness of breath.    Cardiovascular:  Negative for chest pain.   Gastrointestinal:  Negative for nausea and vomiting.   Neurological:  Positive for numbness. Negative for weakness.         Objective:       Wound 06/02/23 Diabetic Ulcer Heel Left (Active)   Enter Oliveros score: Oliveros Grade 2: Deep ulcer extended to ligament, tendon, joint capsule, bone, or deep fascia without abscess or osteomyelitis (OM) 06/13/24 0902   Wound Image Images linked 06/13/24 0900   Wound Description Yellow;Brown 06/13/24 0902   Lani-wound Assessment Callus 06/13/24 0902   Wound Length (cm) 0 cm 06/13/24 0902   Wound Width (cm) 0 cm 06/13/24 0902   Wound Depth (cm) 0 cm 06/13/24 0902   Wound Surface Area (cm^2) 0 cm^2 06/13/24 0902   Wound Volume (cm^3) 0 cm^3 06/13/24 0902   Calculated Wound Volume (cm^3) 0 cm^3 06/13/24 0902   Drainage Amount None 06/13/24 0902   Non-staged Wound Description Not applicable 06/13/24 0902   Treatments Cleansed 06/13/24 0902       /70   Pulse 71   Temp (!) 96.6 °F (35.9 °C)   Resp 18     Physical Exam  Vitals and nursing note reviewed.   Constitutional:       General: He is not in acute distress.     Appearance: He is not toxic-appearing or diaphoretic.   Cardiovascular:      Rate and Rhythm: Normal rate and regular rhythm.      Pulses:           Dorsalis pedis pulses are 1+ on the left side.        Posterior tibial pulses are detected w/ Doppler on the left side.   Pulmonary:      Effort: Pulmonary effort is normal. No respiratory distress.   Musculoskeletal:         General: No signs of injury.      Right foot: No foot drop.      Left foot: No foot drop.   Feet:      Comments: Biphasic PTA left.  Skin:     General: Skin is warm.      Capillary Refill: Capillary refill takes less than 2 seconds.      Coloration: Skin is not cyanotic or mottled.      Findings: No abscess or erythema.       Nails: There is no clubbing.      Comments: Wound is completely healed.  No drainage.  No redness.  No signs of infection.  See wound assessment and photo.     Neurological:      General: No focal deficit present.      Mental Status: He is alert and oriented to person, place, and time.      Cranial Nerves: No cranial nerve deficit.      Sensory: No sensory deficit.      Motor: No weakness.      Coordination: Coordination normal.   Psychiatric:         Mood and Affect: Mood normal.         Behavior: Behavior normal.         Thought Content: Thought content normal.         Judgment: Judgment normal.           Wound Instructions:  Orders Placed This Encounter   Procedures    Wound cleansing and dressings Diabetic Ulcer Left Heel     Left heel is Healed!  There is no drainage- To stay healed protect the newly fragile growing tissue.     Apply a bordered foam to cushion the area. The bordered foam is good for 3 days and you can remove it and re-apply for 3 days.  Change the dressing as needed for any drainage    Continue to wear your offloading shoe until you get new prescription for shoes    Follow up with Dr. Church for routine nail care and prescription shoes in his podiatry office.     Look for Mepilex Border Flex or any other Self-adherent soft silicone bordered foam dressing on [employer]     Standing Status:   Future     Standing Expiration Date:   6/13/2024    Wound Procedure Treatment Diabetic Ulcer Left Heel     This order was created via procedure documentation    Diabetic Shoe Inserts     Order Specific Question:   Type     Answer:   Custom Fabricated    Diabetic Shoe        Diagnosis ICD-10-CM Associated Orders   1. Ulcer of left heel, limited to breakdown of skin ([facility])  L97.421 Wound cleansing and dressings Diabetic Ulcer Left Heel     Wound Procedure Treatment Diabetic Ulcer Left Heel     Diabetic Shoe Inserts     Diabetic Shoe      2. Type 2 diabetes mellitus with diabetic neuropathy, unspecified  whether long term insulin use (HCC)  E11.40 Diabetic Shoe Inserts     Diabetic Shoe      3. Peripheral arterial disease (HCC)  I73.9 Diabetic Shoe Inserts     Diabetic Shoe

## 2024-06-13 NOTE — TELEPHONE ENCOUNTER
Caller: Eliana    Doctor and/or Office: [facility]/saw MAS#: [phone]    [place]: Appointment per Dr Naik patient needs to be seen in 2 weeks.  Please advise thank you

## 2024-06-13 NOTE — PROGRESS NOTES
Wound Procedure Treatment Diabetic Ulcer Left Heel    Performed by: Mariah Reilly RN  Authorized by: Franklin Church DPM    Associated wounds:   Wound 06/02/23 Diabetic Ulcer Heel Left  Wound cleansed with:  NSS  Applied primary dressing:  Silicone bordered foam

## 2024-06-17 ENCOUNTER — HOSPITAL ENCOUNTER (EMERGENCY)
Facility: HOSPITAL | Age: 64
Discharge: LEFT BEFORE DISCHARGE | End: 2024-06-17
Payer: MEDICARE

## 2024-06-17 VITALS
HEART RATE: 65 BPM | TEMPERATURE: 98 F | DIASTOLIC BLOOD PRESSURE: 66 MMHG | RESPIRATION RATE: 18 BRPM | SYSTOLIC BLOOD PRESSURE: 144 MMHG | OXYGEN SATURATION: 96 %

## 2024-06-17 LAB
ALBUMIN SERPL BCG-MCNC: 4.4 G/DL (ref 3.5–5)
ALP SERPL-CCNC: 122 U/L (ref 34–104)
ALT SERPL W P-5'-P-CCNC: 11 U/L (ref 7–52)
ANION GAP SERPL CALCULATED.3IONS-SCNC: 10 MMOL/L (ref 4–13)
AST SERPL W P-5'-P-CCNC: 12 U/L (ref 13–39)
BASOPHILS # BLD AUTO: 0.03 THOUSANDS/ÂΜL (ref 0–0.1)
BASOPHILS NFR BLD AUTO: 1 % (ref 0–1)
BILIRUB SERPL-MCNC: 0.63 MG/DL (ref 0.2–1)
BUN SERPL-MCNC: 26 MG/DL (ref 5–25)
CALCIUM SERPL-MCNC: 9.3 MG/DL (ref 8.4–10.2)
CARDIAC TROPONIN I PNL SERPL HS: 51 NG/L
CHLORIDE SERPL-SCNC: 95 MMOL/L (ref 96–108)
CO2 SERPL-SCNC: 32 MMOL/L (ref 21–32)
CREAT SERPL-MCNC: 4.21 MG/DL (ref 0.6–1.3)
EOSINOPHIL # BLD AUTO: 0.15 THOUSAND/ÂΜL (ref 0–0.61)
EOSINOPHIL NFR BLD AUTO: 2 % (ref 0–6)
ERYTHROCYTE [DISTWIDTH] IN BLOOD BY AUTOMATED COUNT: 16.4 % (ref 11.6–15.1)
GFR SERPL CREATININE-BSD FRML MDRD: 13 ML/MIN/1.73SQ M
GLUCOSE SERPL-MCNC: 101 MG/DL (ref 65–140)
GLUCOSE SERPL-MCNC: 105 MG/DL (ref 65–140)
HCT VFR BLD AUTO: 36.9 % (ref 36.5–49.3)
HGB BLD-MCNC: 11.7 G/DL (ref 12–17)
IMM GRANULOCYTES # BLD AUTO: 0.05 THOUSAND/UL (ref 0–0.2)
IMM GRANULOCYTES NFR BLD AUTO: 1 % (ref 0–2)
LYMPHOCYTES # BLD AUTO: 0.81 THOUSANDS/ÂΜL (ref 0.6–4.47)
LYMPHOCYTES NFR BLD AUTO: 13 % (ref 14–44)
MCH RBC QN AUTO: 31.5 PG (ref 26.8–34.3)
MCHC RBC AUTO-ENTMCNC: 31.7 G/DL (ref 31.4–37.4)
MCV RBC AUTO: 100 FL (ref 82–98)
MONOCYTES # BLD AUTO: 0.7 THOUSAND/ÂΜL (ref 0.17–1.22)
MONOCYTES NFR BLD AUTO: 11 % (ref 4–12)
NEUTROPHILS # BLD AUTO: 4.56 THOUSANDS/ÂΜL (ref 1.85–7.62)
NEUTS SEG NFR BLD AUTO: 72 % (ref 43–75)
NRBC BLD AUTO-RTO: 0 /100 WBCS
PLATELET # BLD AUTO: 114 THOUSANDS/UL (ref 149–390)
PMV BLD AUTO: 11.1 FL (ref 8.9–12.7)
POTASSIUM SERPL-SCNC: 3.9 MMOL/L (ref 3.5–5.3)
PROT SERPL-MCNC: 8 G/DL (ref 6.4–8.4)
RBC # BLD AUTO: 3.71 MILLION/UL (ref 3.88–5.62)
SODIUM SERPL-SCNC: 137 MMOL/L (ref 135–147)
WBC # BLD AUTO: 6.3 THOUSAND/UL (ref 4.31–10.16)

## 2024-06-17 PROCEDURE — 93005 ELECTROCARDIOGRAM TRACING: CPT

## 2024-06-17 PROCEDURE — 36415 COLL VENOUS BLD VENIPUNCTURE: CPT

## 2024-06-17 PROCEDURE — 85025 COMPLETE CBC W/AUTO DIFF WBC: CPT

## 2024-06-17 PROCEDURE — 82948 REAGENT STRIP/BLOOD GLUCOSE: CPT

## 2024-06-17 PROCEDURE — 84484 ASSAY OF TROPONIN QUANT: CPT

## 2024-06-17 PROCEDURE — 80053 COMPREHEN METABOLIC PANEL: CPT

## 2024-06-18 LAB
ATRIAL RATE: 67 BPM
P AXIS: 32 DEGREES
PR INTERVAL: 192 MS
QRS AXIS: 251 DEGREES
QRSD INTERVAL: 152 MS
QT INTERVAL: 498 MS
QTC INTERVAL: 526 MS
T WAVE AXIS: -11 DEGREES
VENTRICULAR RATE: 67 BPM

## 2024-06-18 PROCEDURE — 93010 ELECTROCARDIOGRAM REPORT: CPT | Performed by: INTERNAL MEDICINE

## 2024-07-02 ENCOUNTER — OFFICE VISIT (OUTPATIENT)
Dept: PODIATRY | Facility: CLINIC | Age: 64
End: 2024-07-02
Payer: MEDICARE

## 2024-07-02 VITALS — HEIGHT: 69 IN | BODY MASS INDEX: 30.96 KG/M2 | WEIGHT: 209 LBS

## 2024-07-02 DIAGNOSIS — B35.1 ONYCHOMYCOSIS: ICD-10-CM

## 2024-07-02 DIAGNOSIS — I73.9 PERIPHERAL ARTERIAL DISEASE (HCC): ICD-10-CM

## 2024-07-02 DIAGNOSIS — Z89.421 ABSENCE OF TOE OF RIGHT FOOT (HCC): ICD-10-CM

## 2024-07-02 DIAGNOSIS — E11.42 DIABETIC POLYNEUROPATHY ASSOCIATED WITH TYPE 2 DIABETES MELLITUS (HCC): Primary | ICD-10-CM

## 2024-07-02 PROCEDURE — 99213 OFFICE O/P EST LOW 20 MIN: CPT | Performed by: PODIATRIST

## 2024-07-02 PROCEDURE — 11721 DEBRIDE NAIL 6 OR MORE: CPT | Performed by: PODIATRIST

## 2024-07-02 NOTE — PROGRESS NOTES
Patient ID: Asad Galloway is a 64 y.o. male Date of Birth 1960     Assessment:    No problem-specific Assessment & Plan notes found for this encounter.       Diagnoses and all orders for this visit:    Diabetic polyneuropathy associated with type 2 diabetes mellitus (HCC)  -     Diabetic Shoe Inserts  -     Diabetic Shoe    Peripheral arterial disease (HCC)  -     Diabetic Shoe Inserts  -     Diabetic Shoe    Absence of toe of right foot (HCC)  -     Diabetic Shoe Inserts  -     Diabetic Shoe    Onychomycosis        Procedures    Plan:  Reviewed medical records.  Reviewed the notes from wound care.  Discussed his condition and prognosis.    Instructed skin care and protection.  Recommended to use foam pad to left heel.  Continue to use Prevalon boot at night.    Sent him for DM shoes and inserts.  4.  Educated disease prevention and risks related to diabetes.  Educated proper daily foot care and exam.  Instructed proper skin care / protection and footwear.  Instructed to identify any signs of infection and related foot problem.   5. The recent blood work was reviewed / discussed and the last HbA1c was 5.5.    6. The patient will return in 10 weeks for diabetic foot exam.               Subjective:          HPI    The patient presents for evaluation of left heel.  Recently discharged from wound center.  No drainage or pain in left heel.  He presents with old diabetic shoes.  Complained of thick nails.  BS under control.        The following portions of the patient's history were reviewed and updated as appropriate: allergies, current medications, past family history, past medical history, past social history, past surgical history, and problem list.      PAST MEDICAL HISTORY:  Past Medical History:   Diagnosis Date    Cerebrovascular accident (CVA) due to thrombosis of left middle cerebral artery (HCC) 07/29/2018    Chronic kidney disease     Coronary artery disease     Diabetes mellitus (HCC)     not on meds     "Dialysis patient ([facility])     M-W-F    Fistula     left upper arm for hemodialyis    GERD (gastroesophageal reflux disease)     History of coronary artery stent placement     x3    Hypercholesteremia     Hyperlipidemia     Hypertension     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    Limb alert care status     no BP/IV left arm    Neuropathy     Obesity     Osteomyelitis (HCC)     last assessed 11/4/16-per son not currently    PVC's (premature ventricular contractions)     sees [facility]    Stroke ([facility])     last weeof July 2018 [facility]    TIA (transient ischemic attack) 10/28/2018    Wears dentures     Wears glasses        PAST SURGICAL HISTORY:  Past Surgical History:   Procedure Laterality Date    ABDOMINAL SURGERY      CARDIAC CATHETERIZATION N/A 05/02/2022    Procedure: Cardiac Coronary Angiogram;  Surgeon: Sam Davis MD;  [facility];  Service: Cardiology    CARDIAC CATHETERIZATION N/A 05/02/2022    Procedure: Cardiac pci;  Surgeon: Sam Davis MD;  [facility];  Service: Cardiology    CARDIAC CATHETERIZATION  02/01/2023    Procedure: Cardiac catheterization;  Surgeon: Sam Davis MD;  [facility];  Service: Cardiology    CARDIAC CATHETERIZATION N/A 02/01/2023    Procedure: Cardiac pci;  Surgeon: Sam Davis MD;  [facility];  Service: Cardiology    CARDIAC CATHETERIZATION N/A 02/01/2023    Procedure: Cardiac Coronary Angiogram;  Surgeon: Sam Davis MD;  [facility];  Service: Cardiology    CARDIAC CATHETERIZATION N/A 02/01/2023    Procedure: Cardiac other-IVUS;  Surgeon: Sam Davis MD;  [facility];  Service: Cardiology    CHOLECYSTECTOMY      Percutaneous    COLONOSCOPY      CYSTOSCOPY      HEMODIALYSIS ADULT  1/22/2024    HEMODIALYSIS ADULT  1/24/2024    IR LOWER EXTREMITY ANGIOGRAM  9/29/2023    IR LOWER EXTREMITY ANGIOGRAM  2/26/2024    OTHER SURGICAL HISTORY      \"stimulator " "to control bowel movements\"    UT ESOPHAGOGASTRODUODENOSCOPY TRANSORAL DIAGNOSTIC N/A 09/27/2016    Procedure: ESOPHAGOGASTRODUODENOSCOPY (EGD);  Surgeon: Adele Rowe MD;  Location: [facility];  Service: Gastroenterology    UT LAPAROSCOPY SURG CHOLECYSTECTOMY N/A 02/29/2016    Procedure: LAPAROSCOPIC CHOLECYSTECTOMY ;  Surgeon: Cliff Roman DO;  Location: [facility];  Service: General    UT SLCTV CATHJ 3RD+ ORD SLCTV ABDL PEL/LXTR BRNCH Left 9/29/2023    Procedure: Left leg angiogram with intervention;  Surgeon: Michelle Galvan MD;  Location: [facility];  Service: Vascular    UT SLCTV CATHJ 3RD+ ORD SLCTV ABDL PEL/LXTR BRNCH Left 2/26/2024    Procedure: Left leg angiogram antegrade access, left popliteal angioplasty;  Surgeon: Michelle Galvan MD;  Location: [facility];  Service: Vascular    ROTATOR CUFF REPAIR Right     SPINAL CORD STIMULATOR IMPLANT      \"Medtronic interstim model # 3058- in lower back to control bowel movements-currently turned off-battery is dead\"    TOE AMPUTATION Right 10/28/2016    Procedure: 3RD TOE AMPUTATION ;  Surgeon: Anjel Salas DPM;  Location[facility];  Service:         ALLERGIES:  Patient has no known allergies.    MEDICATIONS:  Current Outpatient Medications   Medication Sig Dispense Refill    aspirin (ECOTRIN LOW STRENGTH) 81 mg EC tablet Take 81 mg by mouth daily Resume on 8/14        atorvastatin (LIPITOR) 40 mg tablet TAKE 1 TABLET BY MOUTH DAILY WITH DINNER 90 tablet 1    divalproex sodium (DEPAKOTE SPRINKLE) 125 MG capsule TAKE 2 CAPSULES (250 MG TOTAL) BY MOUTH EVERY 12 (TWELVE) HOURS 360 capsule 1    metoprolol succinate (TOPROL-XL) 50 mg 24 hr tablet TAKE 1 TABLET BY MOUTH TWICE A  tablet 3    prasugrel (EFFIENT) tablet Take 1 tablet (5 mg total) by mouth daily 90 tablet 1    sacubitril-valsartan (Entresto) 24-26 MG TABS Take 1 tablet by mouth in the morning Bedtime 90 tablet 3    Sevelamer Carbonate 2.4 g PACK VIGOROUSLY MIX CONTENTS OF 1 " PACKET IN WATER (IT WILL NOT DISSOLVE) AND DRINK 3 TIMES DAILY WITH MEALS      sodium hypochlorite (DAKIN'S HALF-STRENGTH) external solution Apply moistened gauze with Dakin to the wound daily. 473 mL 1    Vitamin D3 1.25 MG (44791 UT) capsule TAKE 1 CAPSULE BY MOUTH ONE TIME PER WEEK 12 capsule 4    benzonatate (TESSALON) 200 MG capsule Take 1 capsule (200 mg total) by mouth 3 (three) times a day as needed for cough (Patient not taking: Reported on 5/16/2024) 20 capsule 0    Blood Glucose Monitoring Suppl (True Metrix Meter) w/Device KIT Use to test blood sugars 3 times daily 1 kit 0    Blood Pressure Monitoring (BLOOD PRESSURE CUFF) MISC Use to check blood pressure before taking blood pressure medication and 1 hour after and follow instructions provided in discharge instructions based on the readings. 1 each 0    glucose blood (True Metrix Blood Glucose Test) test strip Use 1 each 3 (three) times a day Use as instructed 300 strip 1    Lancets MISC Use 3 (three) times a day Use 3 times daily 300 each 0     No current facility-administered medications for this visit.       SOCIAL HISTORY:  Social History     Socioeconomic History    Marital status: /Civil Union     Spouse name: None    Number of children: None    Years of education: None    Highest education level: Not asked   Occupational History    Occupation: disabled   Tobacco Use    Smoking status: Never    Smokeless tobacco: Never   Vaping Use    Vaping status: Never Used   Substance and Sexual Activity    Alcohol use: Never    Drug use: No    Sexual activity: Not Currently     Partners: Female     Comment: defer   Other Topics Concern    None   Social History Narrative    Daily caffeine consumption 2-3 servings a day     Social Determinants of Health     Financial Resource Strain: Patient Declined (7/13/2023)    Overall Financial Resource Strain (CARDIA)     Difficulty of Paying Living Expenses: Patient declined   Food Insecurity: No Food Insecurity  "(1/19/2024)    Hunger Vital Sign     Worried About Running Out of Food in the Last Year: Never true     Ran Out of Food in the Last Year: Never true   Transportation Needs: No Transportation Needs (1/19/2024)    PRAPARE - Transportation     Lack of Transportation (Medical): No     Lack of Transportation (Non-Medical): No   Physical Activity: Not on file   Stress: No Stress Concern Present (1/18/2019)    [employer] - Occupational Stress Questionnaire     Feeling of Stress : Only a little   Social Connections: Unknown (1/18/2019)    Social Connection and Isolation Panel [NHANES]     Frequency of Communication with Friends and Family: Not on file     Frequency of Social Gatherings with Friends and Family: Not on file     Attends Rastafari Services: Not on file     Active Member of Clubs or Organizations: Not on file     Attends Club or Organization Meetings: Not on file     Marital Status:    Intimate Partner Violence: Not At Risk (1/18/2019)    Humiliation, Afraid, Rape, and Kick questionnaire     Fear of Current or Ex-Partner: No     Emotionally Abused: No     Physically Abused: No     Sexually Abused: No   Housing Stability: Low Risk  (1/19/2024)    Housing Stability Vital Sign     Unable to Pay for Housing in the Last Year: No     Number of Times Moved in the Last Year: 1     Homeless in the Last Year: No        Review of Systems   Constitutional:  Negative for chills and fever.   Respiratory:  Negative for cough and shortness of breath.    Cardiovascular:  Negative for chest pain.   Gastrointestinal:  Negative for nausea and vomiting.   Neurological:  Positive for numbness. Negative for weakness.         Objective:              Ht 5' 9\" (1.753 m)   Wt 94.8 kg (209 lb)   BMI 30.86 kg/m²     Physical Exam  Vitals and nursing note reviewed.   Constitutional:       General: He is not in acute distress.     Appearance: He is not toxic-appearing or diaphoretic.   Cardiovascular:    "   Rate and Rhythm: Normal rate and regular rhythm.      Pulses:           Dorsalis pedis pulses are 1+ on the right side and 1+ on the left side.        Posterior tibial pulses are 0 on the right side and 0 on the left side.   Pulmonary:      Effort: Pulmonary effort is normal. No respiratory distress.   Musculoskeletal:         General: Deformity present. No signs of injury.      Right lower leg: Edema present.      Left lower leg: Edema present.      Right foot: No Charcot foot or foot drop.      Left foot: No Charcot foot or foot drop.      Comments: S/P partial amp right 3rd toe.   Feet:      Comments: Biphasic PTA left.  Skin:     General: Skin is warm and dry.      Capillary Refill: Capillary refill takes less than 2 seconds.      Coloration: Skin is not cyanotic or mottled.      Findings: No abscess or erythema.      Nails: There is no clubbing.      Comments: Wound remains healed.  No drainage.  No redness.  No signs of infection.  Thick, mycotic nails X 7 with onycholysis and discoloration.   Neurological:      General: No focal deficit present.      Mental Status: He is alert and oriented to person, place, and time.      Cranial Nerves: No cranial nerve deficit.      Sensory: Sensory deficit present.      Motor: No weakness.      Coordination: Coordination normal.   Psychiatric:         Mood and Affect: Mood normal.         Behavior: Behavior normal.         Thought Content: Thought content normal.         Judgment: Judgment normal.

## 2024-07-03 ENCOUNTER — TELEPHONE (OUTPATIENT)
Age: 64
End: 2024-07-03

## 2024-07-03 DIAGNOSIS — R26.89 POOR BALANCE: ICD-10-CM

## 2024-07-03 DIAGNOSIS — E11.42 DIABETIC POLYNEUROPATHY ASSOCIATED WITH TYPE 2 DIABETES MELLITUS (HCC): Primary | ICD-10-CM

## 2024-07-03 NOTE — TELEPHONE ENCOUNTER
Caller: Eliana Galloway -spouse     Doctor: Ranjit     Reason for call: Spouse calling to request a physical therapy order script for patient please contact back if able to place script     Please advise     Call back#: [phone]

## 2024-07-05 ENCOUNTER — APPOINTMENT (EMERGENCY)
Dept: RADIOLOGY | Facility: HOSPITAL | Age: 64
End: 2024-07-05
Payer: MEDICARE

## 2024-07-05 ENCOUNTER — HOSPITAL ENCOUNTER (OUTPATIENT)
Facility: HOSPITAL | Age: 64
Setting detail: OBSERVATION
Discharge: HOME | End: 2024-07-06
Attending: EMERGENCY MEDICINE | Admitting: HOSPITALIST
Payer: MEDICARE

## 2024-07-05 DIAGNOSIS — R79.89 ELEVATED TROPONIN: ICD-10-CM

## 2024-07-05 DIAGNOSIS — N18.6 ESRD ON DIALYSIS (HCC): ICD-10-CM

## 2024-07-05 DIAGNOSIS — Z99.2 ESRD ON DIALYSIS (HCC): ICD-10-CM

## 2024-07-05 DIAGNOSIS — M25.571 ACUTE RIGHT ANKLE PAIN: Primary | ICD-10-CM

## 2024-07-05 DIAGNOSIS — R42 LIGHTHEADEDNESS: ICD-10-CM

## 2024-07-05 PROBLEM — I50.9 CHF (CONGESTIVE HEART FAILURE) (HCC): Status: ACTIVE | Noted: 2024-07-05

## 2024-07-05 PROBLEM — R29.6 FALLS: Status: ACTIVE | Noted: 2024-01-19

## 2024-07-05 LAB
2HR DELTA HS TROPONIN: 1 NG/L
4HR DELTA HS TROPONIN: -3 NG/L
ALBUMIN SERPL BCG-MCNC: 3.7 G/DL (ref 3.5–5)
ALP SERPL-CCNC: 105 U/L (ref 34–104)
ALT SERPL W P-5'-P-CCNC: 9 U/L (ref 7–52)
ANION GAP SERPL CALCULATED.3IONS-SCNC: 11 MMOL/L (ref 4–13)
AST SERPL W P-5'-P-CCNC: 9 U/L (ref 13–39)
BASOPHILS # BLD AUTO: 0.01 THOUSANDS/ÂΜL (ref 0–0.1)
BASOPHILS NFR BLD AUTO: 0 % (ref 0–1)
BILIRUB SERPL-MCNC: 0.5 MG/DL (ref 0.2–1)
BUN SERPL-MCNC: 30 MG/DL (ref 5–25)
CALCIUM SERPL-MCNC: 8.7 MG/DL (ref 8.4–10.2)
CARDIAC TROPONIN I PNL SERPL HS: 94 NG/L
CARDIAC TROPONIN I PNL SERPL HS: 97 NG/L
CARDIAC TROPONIN I PNL SERPL HS: 98 NG/L
CHLORIDE SERPL-SCNC: 97 MMOL/L (ref 96–108)
CO2 SERPL-SCNC: 31 MMOL/L (ref 21–32)
CREAT SERPL-MCNC: 4.41 MG/DL (ref 0.6–1.3)
EOSINOPHIL # BLD AUTO: 0.06 THOUSAND/ÂΜL (ref 0–0.61)
EOSINOPHIL NFR BLD AUTO: 1 % (ref 0–6)
ERYTHROCYTE [DISTWIDTH] IN BLOOD BY AUTOMATED COUNT: 15.9 % (ref 11.6–15.1)
GFR SERPL CREATININE-BSD FRML MDRD: 13 ML/MIN/1.73SQ M
GLUCOSE SERPL-MCNC: 196 MG/DL (ref 65–140)
HCT VFR BLD AUTO: 32.8 % (ref 36.5–49.3)
HGB BLD-MCNC: 10.6 G/DL (ref 12–17)
IMM GRANULOCYTES # BLD AUTO: 0.03 THOUSAND/UL (ref 0–0.2)
IMM GRANULOCYTES NFR BLD AUTO: 1 % (ref 0–2)
LYMPHOCYTES # BLD AUTO: 0.6 THOUSANDS/ÂΜL (ref 0.6–4.47)
LYMPHOCYTES NFR BLD AUTO: 10 % (ref 14–44)
MCH RBC QN AUTO: 31.9 PG (ref 26.8–34.3)
MCHC RBC AUTO-ENTMCNC: 32.3 G/DL (ref 31.4–37.4)
MCV RBC AUTO: 99 FL (ref 82–98)
MONOCYTES # BLD AUTO: 0.78 THOUSAND/ÂΜL (ref 0.17–1.22)
MONOCYTES NFR BLD AUTO: 13 % (ref 4–12)
NEUTROPHILS # BLD AUTO: 4.78 THOUSANDS/ÂΜL (ref 1.85–7.62)
NEUTS SEG NFR BLD AUTO: 75 % (ref 43–75)
NRBC BLD AUTO-RTO: 0 /100 WBCS
PLATELET # BLD AUTO: 150 THOUSANDS/UL (ref 149–390)
PMV BLD AUTO: 10.6 FL (ref 8.9–12.7)
POTASSIUM SERPL-SCNC: 3.9 MMOL/L (ref 3.5–5.3)
PROT SERPL-MCNC: 6.8 G/DL (ref 6.4–8.4)
RBC # BLD AUTO: 3.32 MILLION/UL (ref 3.88–5.62)
SODIUM SERPL-SCNC: 139 MMOL/L (ref 135–147)
URATE SERPL-MCNC: 2.9 MG/DL (ref 3.5–8.5)
WBC # BLD AUTO: 6.26 THOUSAND/UL (ref 4.31–10.16)

## 2024-07-05 PROCEDURE — 84484 ASSAY OF TROPONIN QUANT: CPT

## 2024-07-05 PROCEDURE — 96360 HYDRATION IV INFUSION INIT: CPT

## 2024-07-05 PROCEDURE — 99223 1ST HOSP IP/OBS HIGH 75: CPT | Performed by: HOSPITALIST

## 2024-07-05 PROCEDURE — 80053 COMPREHEN METABOLIC PANEL: CPT

## 2024-07-05 PROCEDURE — 36415 COLL VENOUS BLD VENIPUNCTURE: CPT

## 2024-07-05 PROCEDURE — 99284 EMERGENCY DEPT VISIT MOD MDM: CPT

## 2024-07-05 PROCEDURE — 73590 X-RAY EXAM OF LOWER LEG: CPT

## 2024-07-05 PROCEDURE — 84550 ASSAY OF BLOOD/URIC ACID: CPT

## 2024-07-05 PROCEDURE — 73610 X-RAY EXAM OF ANKLE: CPT

## 2024-07-05 PROCEDURE — 93005 ELECTROCARDIOGRAM TRACING: CPT

## 2024-07-05 PROCEDURE — 85025 COMPLETE CBC W/AUTO DIFF WBC: CPT

## 2024-07-05 PROCEDURE — 99285 EMERGENCY DEPT VISIT HI MDM: CPT | Performed by: EMERGENCY MEDICINE

## 2024-07-05 RX ORDER — ASPIRIN 81 MG/1
324 TABLET, CHEWABLE ORAL ONCE
Status: COMPLETED | OUTPATIENT
Start: 2024-07-05 | End: 2024-07-05

## 2024-07-05 RX ORDER — SODIUM CHLORIDE 9 MG/ML
250 INJECTION, SOLUTION INTRAVENOUS ONCE
Status: DISCONTINUED | OUTPATIENT
Start: 2024-07-05 | End: 2024-07-05

## 2024-07-05 RX ORDER — ATORVASTATIN CALCIUM 40 MG/1
40 TABLET, FILM COATED ORAL
Status: DISCONTINUED | OUTPATIENT
Start: 2024-07-05 | End: 2024-07-06 | Stop reason: HOSPADM

## 2024-07-05 RX ORDER — MECLIZINE HCL 12.5 MG/1
12.5 TABLET ORAL EVERY 8 HOURS PRN
Status: DISCONTINUED | OUTPATIENT
Start: 2024-07-05 | End: 2024-07-06 | Stop reason: HOSPADM

## 2024-07-05 RX ORDER — DIVALPROEX SODIUM 125 MG/1
250 CAPSULE, COATED PELLETS ORAL EVERY 12 HOURS SCHEDULED
Status: DISCONTINUED | OUTPATIENT
Start: 2024-07-05 | End: 2024-07-06 | Stop reason: HOSPADM

## 2024-07-05 RX ORDER — PRASUGREL 10 MG/1
5 TABLET, FILM COATED ORAL DAILY
Status: DISCONTINUED | OUTPATIENT
Start: 2024-07-06 | End: 2024-07-06 | Stop reason: HOSPADM

## 2024-07-05 RX ORDER — LIDOCAINE 50 MG/G
1 PATCH TOPICAL ONCE
Status: COMPLETED | OUTPATIENT
Start: 2024-07-05 | End: 2024-07-06

## 2024-07-05 RX ORDER — SEVELAMER HYDROCHLORIDE 800 MG/1
2400 TABLET, FILM COATED ORAL 3 TIMES DAILY
Status: DISCONTINUED | OUTPATIENT
Start: 2024-07-05 | End: 2024-07-06 | Stop reason: HOSPADM

## 2024-07-05 RX ORDER — ACETAMINOPHEN 325 MG/1
650 TABLET ORAL EVERY 6 HOURS PRN
Status: DISCONTINUED | OUTPATIENT
Start: 2024-07-05 | End: 2024-07-06 | Stop reason: HOSPADM

## 2024-07-05 RX ORDER — METOPROLOL SUCCINATE 50 MG/1
50 TABLET, EXTENDED RELEASE ORAL 2 TIMES DAILY
Status: DISCONTINUED | OUTPATIENT
Start: 2024-07-05 | End: 2024-07-06 | Stop reason: HOSPADM

## 2024-07-05 RX ORDER — CALCIUM CARBONATE 500 MG/1
1000 TABLET, CHEWABLE ORAL DAILY PRN
Status: DISCONTINUED | OUTPATIENT
Start: 2024-07-05 | End: 2024-07-06 | Stop reason: HOSPADM

## 2024-07-05 RX ORDER — HEPARIN SODIUM 5000 [USP'U]/ML
5000 INJECTION, SOLUTION INTRAVENOUS; SUBCUTANEOUS EVERY 8 HOURS SCHEDULED
Status: DISCONTINUED | OUTPATIENT
Start: 2024-07-05 | End: 2024-07-06 | Stop reason: HOSPADM

## 2024-07-05 RX ADMIN — ASPIRIN 81 MG 324 MG: 81 TABLET ORAL at 16:41

## 2024-07-05 RX ADMIN — DIVALPROEX SODIUM 250 MG: 125 CAPSULE ORAL at 21:55

## 2024-07-05 RX ADMIN — SODIUM CHLORIDE 250 ML: 0.9 INJECTION, SOLUTION INTRAVENOUS at 15:46

## 2024-07-05 RX ADMIN — ATORVASTATIN CALCIUM 40 MG: 40 TABLET, FILM COATED ORAL at 18:28

## 2024-07-05 RX ADMIN — DICLOFENAC SODIUM TOPICAL GEL, 1% 2 G: 10 GEL TOPICAL at 18:29

## 2024-07-05 RX ADMIN — HEPARIN SODIUM 5000 UNITS: 5000 INJECTION INTRAVENOUS; SUBCUTANEOUS at 21:28

## 2024-07-05 RX ADMIN — MECLIZINE 12.5 MG: 12.5 TABLET ORAL at 21:28

## 2024-07-05 RX ADMIN — LIDOCAINE 5% 1 PATCH: 700 PATCH TOPICAL at 15:28

## 2024-07-05 RX ADMIN — SEVELAMER HYDROCHLORIDE 2400 MG: 800 TABLET ORAL at 21:28

## 2024-07-05 NOTE — ASSESSMENT & PLAN NOTE
Lab Results   Component Value Date    HGB 10.6 (L) 07/05/2024    HGB 11.7 (L) 06/17/2024    HGB 11.6 (L) 06/03/2024    HGB 10.9 (L) 02/22/2024        Baseline hemoglobin between 10-11  Chronic anemia in the setting of ESRD  Denied any hematemesis, melena, bleeding  Monitor symptoms and CBC

## 2024-07-05 NOTE — ASSESSMENT & PLAN NOTE
Patient reports dizziness with room spinning sensation for a couple of weeks which is exacerbated after hemodialysis sections.  Patient reports 2 falls due to dizziness at home.  Patient on aspirin and prasugrel.  Denied head strikes.  Received 250 cc IV fluid bolus in the ED.    Plans:  Orthostatic blood pressure  Will start physical therapy on Tuesday  Consider CT head for further evaluation

## 2024-07-05 NOTE — ASSESSMENT & PLAN NOTE
Patient reports 2 falls on Monday secondary to dizziness at home.  Patient on prasugrel and aspirin.  No head strike.  Patient uses a cane for ambulation.  Patient will start outpatient PT session on next Tuesday.  Hold off PT OT evaluation this time.

## 2024-07-05 NOTE — ASSESSMENT & PLAN NOTE
Wt Readings from Last 3 Encounters:   07/02/24 94.8 kg (209 lb)   05/16/24 94.8 kg (209 lb)   05/14/24 94.3 kg (208 lb)       LVEF with echo in February 2023 at [facility] showing EF of 35 to 40%.  Patient denied any shortness of breath, leg swelling or increased weight.  Following cardiologist outpatient and last visit in May.  Continue home meds metoprolol, Entresto.

## 2024-07-05 NOTE — Clinical Note
Case was discussed with MICKEY and the patient's admission status was agreed to be  observation to the service of Dr. Farias

## 2024-07-05 NOTE — ASSESSMENT & PLAN NOTE
Troponin 97 on admission.  Patient denied any chest pain, shortness of breath, palpitation.  EKG did not appreciate ST segment changes.  Elevated troponin likely associated with hemodialysis.

## 2024-07-05 NOTE — H&P
[facility]  H&P  Name: Asad Galloway 64 y.o. male I MRN: [id number]  Unit/Bed#: ED-37 I Date of Admission: 7/5/2024   Date of Service: 7/5/2024 I Hospital Day: 0      Assessment & Plan   * Dizziness  Assessment & Plan  Patient reports dizziness with room spinning sensation for a couple of weeks which is exacerbated after hemodialysis sections.  Patient reports 2 falls due to dizziness at home.  Patient on aspirin and prasugrel.  Denied head strikes.  Received 250 cc IV fluid bolus in the ED.    Plans:  Orthostatic blood pressure  Will start physical therapy on Tuesday  Consider CT head for further evaluation        Generalized weakness  Assessment & Plan  Patient reports generalized weakness after hemodialysis sessions.  Denied loss of appetite, weight loss, nausea, vomiting, abdominal pain, hematemesis, melena, chest pain, shortness of breath, fever, chills.  Patient is on the waiting list for kidney transplant for ESRD.  Patient will start physical therapy on next Tuesday.  Generalized weakness likely related to multiple comorbidities including ESRD on HD, chronic anemia and CHF.    Plans:  Continue management for underlying conditions  Patient will begin physical therapy on Tuesday  Monitor electrolytes and CBC      ESRD on dialysis (HCC)  Assessment & Plan  Lab Results   Component Value Date    EGFR 13 07/05/2024    EGFR 13 06/17/2024    EGFR 11 06/03/2024    CREATININE 4.41 (H) 07/05/2024    CREATININE 4.21 (H) 06/17/2024    CREATININE 4.94 (H) 06/03/2024     ESRD secondary to diabetes.  Creatinine trended down to from January.   Baseline creatinine between 4.2-4.9.  Patient has been on hemodialysis 3 times per week (M/W/F) for 4 years.  Patient feels weak and fatigued after hemodialysis sessions.  Patient is on kidney transplant waiting list.  Nephrology consult.    Right ankle pain  Assessment & Plan  Patient reports right ankle pain after falls on Monday.  X-ray right ankle and  "x-ray right tibia-fibula read by me without any dislocation or fracture.    Plans:  Lidocaine patch and Voltaren gel for right ankle pain  Elevate leg    CHF (congestive heart failure) ([facility])  Assessment & Plan  Wt Readings from Last 3 Encounters:   07/02/24 94.8 kg (209 lb)   05/16/24 94.8 kg (209 lb)   05/14/24 94.3 kg (208 lb)       [place] [facility] [place] 35 to 40%.  Patient denied any shortness of breath, leg swelling or increased weight.  Following cardiologist outpatient and last visit in May.  Continue home meds metoprolol, Entresto.        Falls  Assessment & Plan  Patient reports 2 falls on Monday secondary to dizziness at home.  Patient on prasugrel and aspirin.  No head strike.  Patient uses a cane for ambulation.  Patient will start outpatient PT session on next Tuesday.  Hold off PT OT evaluation this time.      Elevated troponin  Assessment & Plan  Troponin 97 on admission.  Patient denied any chest pain, shortness of breath, palpitation.  EKG did not appreciate ST segment changes.  Elevated troponin likely associated with hemodialysis.    Anemia  Assessment & Plan  Lab Results   Component Value Date    HGB 10.6 (L) 07/05/2024    HGB 11.7 (L) 06/17/2024    HGB 11.6 (L) 06/03/2024    HGB 10.9 (L) 02/22/2024        Baseline hemoglobin between 10-11  Chronic anemia in the setting of ESRD  Denied any hematemesis, melena, bleeding  Monitor symptoms and CBC    Pre-kidney transplant, listed  Assessment & Plan  Patient is in the waiting days for kidney transplant.  Has an appointment at [facility] transplant center on next Tuesday.    Mixed hyperlipidemia  Assessment & Plan  Continue home med Lipitor    Type 2 diabetes mellitus with chronic kidney disease on chronic dialysis, without long-term current use of insulin ([facility])  Assessment & Plan  Lab Results   Component Value Date    HGBA1C 5.1 01/10/2024       No results for input(s): \"POCGLU\" in the last 72 hours.    Blood Sugar Average: " Last 72 hrs:    No home meds for diabetes at present.  Patient on renal diet while inpatient.           VTE Pharmacologic Prophylaxis: VTE Score: 4 Moderate Risk (Score 3-4) - Pharmacological DVT Prophylaxis Ordered: heparin.  Code Status: Level 1 - Full Code   Discussion with family: Updated  (wife) at bedside.    Anticipated Length of Stay: Patient will be admitted on an observation basis with an anticipated length of stay of less than 2 midnights secondary to dizziness and generalized weakness.    Chief Complaint:     History of Present Illness:  Asad Galloway is a 64 y.o. male with a PMH of hypertension, hyperlipidemia, diabetes, ESRD on hemodialysis (M/W/F), congestive heart failure, strokes in 2018, coronary artery disease, GERD who presents with right ankle pain for 3 days and feeling weak after hemodialysis this morning.  Patient reports 2 falls on Monday at home due to weakness and dizziness.  Denied head strike but reports right ankle pain after falls.  He reports dizziness and weakness after each section of hemodialysis.  He has been on hemodialysis 3 times a week for 4 years.  He is currently on the waiting list for renal transplant.  He has been using a cane at home for ambulation.  He will  have the first session of physical therapy on next Tuesday.  Patient denied chest pain, shortness of breath, fever, chills, nausea, vomiting, abdominal pain.  Patient reports that he has been feeling room spinning dizziness for the past several weeks which is exacerbated after hemodialysis sessions.    In the ED, he is hemodynamically stable.  Lab work is significant with elevated troponin 97.  EKG is negative for ST segment elevation.  X-ray right ankles read by me without any dislocation or fracture.  Patient is admitted for observation for dizziness and generalized weakness after hemodialysis.    Review of Systems:  Review of Systems   Constitutional:  Positive for fatigue. Negative for chills and  fever.        Generalized weakness after dialysis   HENT:  Negative for ear pain and sore throat.    Eyes:  Negative for pain and visual disturbance.   Respiratory:  Negative for cough, chest tightness, shortness of breath and wheezing.    Cardiovascular:  Negative for chest pain and palpitations.   Gastrointestinal:  Negative for abdominal pain, blood in stool, nausea and vomiting.   Endocrine: Negative.    Genitourinary:  Negative for dysuria and hematuria.   Musculoskeletal:  Negative for arthralgias and back pain.   Skin:  Negative for color change and rash.   Neurological:  Positive for dizziness, weakness and light-headedness. Negative for seizures, syncope and headaches.   Hematological:  Does not bruise/bleed easily.   Psychiatric/Behavioral:  Negative for confusion.    All other systems reviewed and are negative.      Past Medical and Surgical History:   Past Medical History:   Diagnosis Date    Cerebrovascular accident (CVA) due to thrombosis of left middle cerebral artery (HCC) 07/29/2018    Chronic kidney disease     Coronary artery disease     Diabetes mellitus (HCC)     not on meds    Dialysis patient ([facility])     M-W-F    Fistula     left upper arm for hemodialyis    GERD (gastroesophageal reflux disease)     History of coronary artery stent placement     x3    Hypercholesteremia     Hyperlipidemia     Hypertension     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    Limb alert care status     no BP/IV left arm    Neuropathy     Obesity     Osteomyelitis (HCC)     last assessed 11/4/16-per son not currently    PVC's (premature ventricular contractions)     sees  Cardio    Stroke (HCC)     last weeof July 2018 [facility]    TIA (transient ischemic attack) 10/28/2018    Wears dentures     Wears glasses        Past Surgical History:   Procedure Laterality Date    ABDOMINAL SURGERY      CARDIAC CATHETERIZATION N/A 05/02/2022    Procedure: Cardiac Coronary Angiogram;  Surgeon: Sam Davis MD;   "Location: [facility] CARD[facility];  Service: Cardiology    CARDIAC CATHETERIZATION N/A 05/02/2022    Procedure: Cardiac pci;  Surgeon: Sam Davis MD;  [facility]: [facility] CARD[facility];  Service: Cardiology    CARDIAC CATHETERIZATION  02/01/2023    Procedure: Cardiac catheterization;  Surgeon: Sam Davis MD;  Location: [facility] CARDIAC [facility];  Service: Cardiology    CARDIAC CATHETERIZATION N/A 02/01/2023    Procedure: Cardiac pci;  Surgeon: Sam Davis MD;  [facility]: [facility] CARD[facility]AC [facility];  Service: Cardiology    CARDIAC CATHETERIZATION N/A 02/01/2023    Procedure: Cardiac Coronary Angiogram;  Surgeon: Sam Davis MD;  Location: [facility] CARDIAC [facility];  Service: Cardiology    CARDIAC CATHETERIZATION N/A 02/01/2023    Procedure: Cardiac other-IVUS;  Surgeon: Sam Davis MD;  [facility]: [facility] CARDIAC [facility];  Service: Cardiology    CHOLECYSTECTOMY      Percutaneous    COLONOSCOPY      CYSTOSCOPY      HEMODIALYSIS ADULT  1/22/2024    HEMODIALYSIS ADULT  1/24/2024    IR LOWER EXTREMITY ANGIOGRAM  9/29/2023    IR LOWER EXTREMITY ANGIOGRAM  2/26/2024    OTHER SURGICAL HISTORY      \"stimulator to control bowel movements\"    TN ESOPHAGOGASTRODUODENOSCOPY TRANSORAL DIAGNOSTIC N/A 09/27/2016    Procedure: ESOPHAGOGASTRODUODENOSCOPY (EGD);  Surgeon: Adele Rowe MD;  Location: [facility] GI LAB;  Service: Gastroenterology    TN LAPAROSCOPY SURG CHOLECYSTECTOMY N/A 02/29/2016    Procedure: LAPAROSCOPIC CHOLECYSTECTOMY ;  Surgeon: Cliff Roman DO;  Location: [facility] Main OR;  Service: General    TN SLCTV CATHJ 3RD+ ORD SLCTV ABDL PEL/LXTR BRNCH Left 9/29/2023    Procedure: Left leg angiogram with intervention;  Surgeon: Michelle Galvan MD;  Location: [facility] MAIN [facility];  Service: Vascular    TN SLCTV CATHJ 3RD+ ORD SLCTV ABDL PEL/LXTR BRNCH Left 2/26/2024    Procedure: Left leg angiogram antegrade access, left popliteal angioplasty;  Surgeon: Michelle Galvan MD;  Location: [facility] MA[facility];  Service: Vascular    ROTATOR CUFF REPAIR Right     SPINAL " "CORD STIMULATOR IMPLANT      \"Medtronic interstim model # 3058- in lower back to control bowel movements-currently turned off-battery is dead\"    TOE AMPUTATION Right 10/28/2016    Procedure: 3RD TOE AMPUTATION ;  Surgeon: Anjel Salas DPM;  Location: [facility] Main [facility];  Service:        Meds/Allergies:  Prior to Admission medications    Medication Sig Start Date End Date Taking? Authorizing Provider   aspirin (ECOTRIN LOW STRENGTH) 81 mg EC tablet Take 81 mg by mouth daily Resume on 8/14      Historical Provider, MD   atorvastatin (LIPITOR) 40 mg tablet TAKE 1 TABLET BY MOUTH DAILY WITH DINNER 2/8/24   Raj Auguste DO   benzonatate (TESSALON) 200 MG capsule Take 1 capsule (200 mg total) by mouth 3 (three) times a day as needed for cough  Patient not taking: Reported on 5/16/2024 5/14/24   BRITTANY Elder   Blood Glucose Monitoring Suppl (True Metrix Meter) w/Device KIT Use to test blood sugars 3 times daily 11/24/23   Jaden Espino MD   Blood Pressure Monitoring (BLOOD PRESSURE CUFF) MISC Use to check blood pressure before taking blood pressure medication and 1 hour after and follow instructions provided in discharge instructions based on the readings. 8/13/18   Az Medina MD   divalproex sodium (DEPAKOTE SPRINKLE) 125 MG capsule TAKE 2 CAPSULES (250 MG TOTAL) BY MOUTH EVERY 12 (TWELVE) HOURS 2/12/24   Raj Auguste DO   glucose blood (True Metrix Blood Glucose Test) test strip Use 1 each 3 (three) times a day Use as instructed 1/9/24   Dagoberto Rich PA-C   Lancets MISC Use 3 (three) times a day Use 3 times daily 1/9/24   Dagoberto Rich PA-C   metoprolol succinate (TOPROL-XL) 50 mg 24 hr tablet TAKE 1 TABLET BY MOUTH TWICE A DAY 3/4/24   Britney Tan MD   prasugrel (EFFIENT) tablet Take 1 tablet (5 mg total) by mouth daily 5/3/24   Britney Tan MD   sacubitril-valsartan (Entresto) 24-26 MG TABS Take 1 tablet by mouth in the morning Bedtime 11/21/23   AvelinoMD Celina Chau" Carbonate 2.4 g PACK VIGOROUSLY MIX CONTENTS OF 1 PACKET IN WATER (IT WILL NOT DISSOLVE) AND DRINK 3 TIMES DAILY WITH MEALS 4/21/23   Historical Provider, MD   sodium hypochlorite (DAKIN'S HALF-STRENGTH) external solution Apply moistened gauze with Dakin to the wound daily. 4/11/24   Franklin Church DPM   Vitamin D3 1.25 MG (99433 UT) capsule TAKE 1 CAPSULE BY MOUTH ONE TIME PER WEEK 4/17/24   Mary Torres MD     I have reviewed home medications with patient personally.    Allergies: No Known Allergies    Social History:  Marital Status: /Civil Union   Occupation:   Patient Pre-hospital Living Situation: Home  Patient Pre-hospital Level of Mobility: walks with cane  Patient Pre-hospital Diet Restrictions: Renal diet  Substance Use History:   Social History     Substance and Sexual Activity   Alcohol Use Never     Social History     Tobacco Use   Smoking Status Never   Smokeless Tobacco Never     Social History     Substance and Sexual Activity   Drug Use No       Family History:  Family History   Problem Relation Age of Onset    Leukemia Mother     Liver disease Mother     Lung cancer Mother         heavy smoker - 3 ppd    Heart disease Father     Liver disease Father     Diabetes type I Father     Multiple myeloma Sister     Breast cancer Sister     Urolithiasis Family     Alcohol abuse Neg Hx     Depression Neg Hx     Drug abuse Neg Hx     Substance Abuse Neg Hx     Mental illness Neg Hx        Physical Exam:     Vitals:   Blood Pressure: 137/85 (07/05/24 1700)  Pulse: 91 (07/05/24 1700)  Temperature: 98 °F (36.7 °C) (07/05/24 1256)  Respirations: 18 (07/05/24 1700)  SpO2: 97 % (07/05/24 1700)    Physical Exam  Vitals and nursing note reviewed.   Constitutional:       General: He is not in acute distress.     Appearance: He is well-developed. He is obese.   HENT:      Head: Normocephalic and atraumatic.      Mouth/Throat:      Mouth: Mucous membranes are moist.      Pharynx: Oropharynx is clear.   Eyes:       Extraocular Movements: Extraocular movements intact.      Conjunctiva/sclera: Conjunctivae normal.      Pupils: Pupils are equal, round, and reactive to light.   Cardiovascular:      Rate and Rhythm: Normal rate and regular rhythm.      Heart sounds: Normal heart sounds. No murmur heard.     No gallop.   Pulmonary:      Effort: Pulmonary effort is normal. No respiratory distress.      Breath sounds: Normal breath sounds. No rales.   Abdominal:      General: Bowel sounds are normal. There is no distension.      Palpations: Abdomen is soft.      Tenderness: There is no abdominal tenderness.   Musculoskeletal:         General: No swelling.      Cervical back: Neck supple.      Right lower leg: No edema.      Left lower leg: No edema.      Comments: Lidocaine patch in place at the right ankle.  Limited range of motion at the right ankle.   Skin:     General: Skin is warm and dry.      Capillary Refill: Capillary refill takes less than 2 seconds.      Coloration: Skin is not jaundiced or pale.   Neurological:      Mental Status: He is alert and oriented to person, place, and time.   Psychiatric:         Mood and Affect: Mood normal.          Additional Data:     Lab Results:  Results from last 7 days   Lab Units 07/05/24  1527   WBC Thousand/uL 6.26   HEMOGLOBIN g/dL 10.6*   HEMATOCRIT % 32.8*   PLATELETS Thousands/uL 150   SEGS PCT % 75   LYMPHO PCT % 10*   MONO PCT % 13*   EOS PCT % 1     Results from last 7 days   Lab Units 07/05/24  1527   SODIUM mmol/L 139   POTASSIUM mmol/L 3.9   CHLORIDE mmol/L 97   CO2 mmol/L 31   BUN mg/dL 30*   CREATININE mg/dL 4.41*   ANION GAP mmol/L 11   CALCIUM mg/dL 8.7   ALBUMIN g/dL 3.7   TOTAL BILIRUBIN mg/dL 0.50   ALK PHOS U/L 105*   ALT U/L 9   AST U/L 9*   GLUCOSE RANDOM mg/dL 196*             Lab Results   Component Value Date    HGBA1C 5.1 01/10/2024    HGBA1C 4.9 04/18/2023    HGBA1C 5.5 12/23/2022           Lines/Drains:  Invasive Devices       Peripheral Intravenous Line   Duration             Peripheral IV 07/05/24 Right;Ventral (anterior) Forearm <1 day                        Imaging: Reviewed radiology reports from this admission including: xray(s)  XR ankle 3+ views RIGHT   ED Interpretation by Rayo Bryson MD (07/05 3422)   Per my independent interpretation:  no acute osseous abnormality       XR tibia fibula 2 views RIGHT   ED Interpretation by Rayo Bryson MD (07/05 6582)   Per my independent interpretation:  no acute osseous abnormality           EKG and Other Studies Reviewed on Admission:   EKG:  Sinus rhythm with heart rate 88/min.    ** Please Note: This note has been constructed using a voice recognition system. **

## 2024-07-05 NOTE — ED PROVIDER NOTES
History  Chief Complaint   Patient presents with    Ankle Injury     Patient twisted R ankle on Monday and now having increased swelling. Difficulty walking on it. Denies falling or hitting head.      65 yo-male with PMH of CHF, DBM, ESRD and CVA presents right ankle swelling and pain for the past 3 days. The patient states that he was walking with his son on a flat level surface when he twisted his ankle. The patient denies fall or head strike and notes that his son helped stabilize him. Since this incident, the patient has been suffering from increased pain and swelling to the ankle and distal leg. His son clarifies that the patient has been wearing a boot for the past year for a wound on his left foot. He notes that he has been unsteady on his feet since getting the boot off 2 weeks ago.     In addition, the patient states that he has been feeling increased room spinning dizziness for the past several weeks which is exacerbated after dialysis sessions.     Lastly, his son states that he has troponin elevations after dialysis sessions. They state that he had 8 lbs of fluid dialyzed.       Ankle Injury          Prior to Admission Medications   Prescriptions Last Dose Informant Patient Reported? Taking?   Blood Glucose Monitoring Suppl (True Metrix Meter) w/Device KIT  Child No No   Sig: Use to test blood sugars 3 times daily   Blood Pressure Monitoring (BLOOD PRESSURE CUFF) MISC  Child No No   Sig: Use to check blood pressure before taking blood pressure medication and 1 hour after and follow instructions provided in discharge instructions based on the readings.   Lancets MISC  Child No No   Sig: Use 3 (three) times a day Use 3 times daily   Sevelamer Carbonate 2.4 g PACK  Child Yes No   Sig: VIGOROUSLY MIX CONTENTS OF 1 PACKET IN WATER (IT WILL NOT DISSOLVE) AND DRINK 3 TIMES DAILY WITH MEALS   Vitamin D3 1.25 MG (39881 UT) capsule  Child No No   Sig: TAKE 1 CAPSULE BY MOUTH ONE TIME PER WEEK   aspirin (ECOTRIN  LOW STRENGTH) 81 mg EC tablet  Child Yes No   Sig: Take 81 mg by mouth daily Resume on 8/14     atorvastatin (LIPITOR) 40 mg tablet  Child No No   Sig: TAKE 1 TABLET BY MOUTH DAILY WITH DINNER   benzonatate (TESSALON) 200 MG capsule  Child No No   Sig: Take 1 capsule (200 mg total) by mouth 3 (three) times a day as needed for cough   Patient not taking: Reported on 5/16/2024   divalproex sodium (DEPAKOTE SPRINKLE) 125 MG capsule  Child No No   Sig: TAKE 2 CAPSULES (250 MG TOTAL) BY MOUTH EVERY 12 (TWELVE) HOURS   glucose blood (True Metrix Blood Glucose Test) test strip  Child No No   Sig: Use 1 each 3 (three) times a day Use as instructed   metoprolol succinate (TOPROL-XL) 50 mg 24 hr tablet  Child No No   Sig: TAKE 1 TABLET BY MOUTH TWICE A DAY   prasugrel (EFFIENT) tablet  Child No No   Sig: Take 1 tablet (5 mg total) by mouth daily   sacubitril-valsartan (Entresto) 24-26 MG TABS  Child No No   Sig: Take 1 tablet by mouth in the morning Bedtime   sodium hypochlorite (DAKIN'S HALF-STRENGTH) external solution  Child No No   Sig: Apply moistened gauze with Dakin to the wound daily.      Facility-Administered Medications: None       Past Medical History:   Diagnosis Date    Cerebrovascular accident (CVA) due to thrombosis of left middle cerebral artery (HCC) 07/29/2018    Chronic kidney disease     Coronary artery disease     Diabetes mellitus (HCC)     not on meds    Dialysis patient (HCC)     M-W-F    Fistula     left upper arm for hemodialyis    GERD (gastroesophageal reflux disease)     History of coronary artery stent placement     x3    Hypercholesteremia     Hyperlipidemia     Hypertension     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    Limb alert care status     no BP/IV left arm    Neuropathy     Obesity     Osteomyelitis (HCC)     last assessed 11/4/16-per son not currently    PVC's (premature ventricular contractions)     sees [facility]    Stroke (HCC)     last weeof July 2018 [facility]     "TIA (transient ischemic attack) 10/28/2018    Wears dentures     Wears glasses        Past Surgical History:   Procedure Laterality Date    ABDOMINAL SURGERY      CARDIAC CATHETERIZATION N/A 05/02/2022    Procedure: Cardiac Coronary Angiogram;  Surgeon: Sam Davis MD;  [facility];  Service: Cardiology    CARDIAC CATHETERIZATION N/A 05/02/2022    Procedure: Cardiac pci;  Surgeon: Sam Davis MD;  [facility];  Service: Cardiology    CARDIAC CATHETERIZATION  02/01/2023    Procedure: Cardiac catheterization;  Surgeon: Sam Davis MD;  [facility];  Service: Cardiology    CARDIAC CATHETERIZATION N/A 02/01/2023    Procedure: Cardiac pci;  Surgeon: Sam Davis MD;  [facility];  Service: Cardiology    CARDIAC CATHETERIZATION N/A 02/01/2023    Procedure: Cardiac Coronary Angiogram;  Surgeon: Sam Davis MD;  [facility];  Service: Cardiology    CARDIAC CATHETERIZATION N/A 02/01/2023    Procedure: Cardiac other-IVUS;  Surgeon: Sam Davis MD;  [facility];  Service: Cardiology    CHOLECYSTECTOMY      Percutaneous    COLONOSCOPY      CYSTOSCOPY      HEMODIALYSIS ADULT  1/22/2024    HEMODIALYSIS ADULT  1/24/2024    IR LOWER EXTREMITY ANGIOGRAM  9/29/2023    IR LOWER EXTREMITY ANGIOGRAM  2/26/2024    OTHER SURGICAL HISTORY      \"stimulator to control bowel movements\"    PA ESOPHAGOGASTRODUODENOSCOPY TRANSORAL DIAGNOSTIC N/A 09/27/2016    Procedure: ESOPHAGOGASTRODUODENOSCOPY (EGD);  Surgeon: Adele Rowe MD;  Location: [facility] GI [facility];  Service: Gastroenterology    PA LAPAROSCOPY SURG CHOLECYSTECTOMY N/A 02/29/2016    Procedure: LAPAROSCOPIC CHOLECYSTECTOMY ;  Surgeon: Cliff Roman DO;  Location: [facility];  Service: General    PA SLCTV CATHJ 3RD+ ORD SLCTV ABDL PEL/LXTR BRNCH Left 9/29/2023    Procedure: Left leg angiogram with intervention;  Surgeon: Michelle Galvan MD;  Location[facility];  Service: " "Vascular    LA SLCTV CATHJ 3RD+ ORD SLCTV ABDL PEL/LXTR BRNCH Left 2/26/2024    Procedure: Left leg angiogram antegrade access, left popliteal angioplasty;  Surgeon: Michelle Galvan MD;  Location[facility];  Service: Vascular    ROTATOR CUFF REPAIR Right     SPINAL CORD STIMULATOR IMPLANT      \"Medtronic interstim model # 3058- in lower back to control bowel movements-currently turned off-battery is dead\"    TOE AMPUTATION Right 10/28/2016    Procedure: 3RD TOE AMPUTATION ;  Surgeon: Anjel Salas DPM;  Location[facility];  Service:        Family History   Problem Relation Age of Onset    Leukemia Mother     Liver disease Mother     Lung cancer Mother         heavy smoker - 3 ppd    Heart disease Father     Liver disease Father     Diabetes type I Father     Multiple myeloma Sister     Breast cancer Sister     Urolithiasis Family     Alcohol abuse Neg Hx     Depression Neg Hx     Drug abuse Neg Hx     Substance Abuse Neg Hx     Mental illness Neg Hx      I have reviewed and agree with the history as documented.    E-Cigarette/Vaping    E-Cigarette Use Never User      E-Cigarette/Vaping Substances    Nicotine No     THC No     CBD No     Flavoring No     Other No     Unknown No      Social History     Tobacco Use    Smoking status: Never    Smokeless tobacco: Never   Vaping Use    Vaping status: Never Used   Substance Use Topics    Alcohol use: Never    Drug use: No        Review of Systems    Physical Exam  ED Triage Vitals [07/05/24 1256]   Temperature Pulse Respirations Blood Pressure SpO2   98 °F (36.7 °C) 79 18 (!) 91/43 97 %      Temp src Heart Rate Source Patient Position - Orthostatic VS BP Location FiO2 (%)   -- Monitor Sitting Right arm --      Pain Score       --             Orthostatic Vital Signs  Vitals:    07/05/24 1256 07/05/24 1353   BP: (!) 91/43 117/57   Pulse: 79 88   Patient Position - Orthostatic VS: Sitting Lying       Physical Exam  Constitutional:       Appearance: He is " obese. He is ill-appearing.   HENT:      Head: Normocephalic and atraumatic.      Mouth/Throat:      Mouth: Mucous membranes are dry.      Pharynx: Oropharynx is clear. Posterior oropharyngeal erythema present.   Eyes:      Conjunctiva/sclera: Conjunctivae normal.      Pupils: Pupils are equal, round, and reactive to light.   Cardiovascular:      Rate and Rhythm: Normal rate and regular rhythm.      Heart sounds: Normal heart sounds.   Pulmonary:      Breath sounds: Rhonchi present. No wheezing or rales.   Abdominal:      General: There is distension.      Tenderness: There is no abdominal tenderness. There is no guarding.   Musculoskeletal:      Cervical back: Neck supple. No rigidity or tenderness.      Right lower leg: Swelling and bony tenderness present. No deformity or lacerations. 2+ Pitting Edema present.      Left lower le+ Pitting Edema present.      Right ankle: Swelling present. No deformity or ecchymosis. Tenderness present over the ATF ligament, AITF ligament and proximal fibula. No lateral malleolus or medial malleolus tenderness. Decreased range of motion. Anterior drawer test negative. Normal pulse.      Right Achilles Tendon: No tenderness.   Skin:     Coloration: Skin is not jaundiced.      Findings: Lesion present. No bruising.      Comments: Lesion present over right knee         ED Medications  Medications - No data to display    Diagnostic Studies  Results Reviewed       Procedure Component Value Units Date/Time    CBC and differential [[id number]]     Lab Status: No result Specimen: Blood     HS Troponin 0hr (reflex protocol) [[id number]]     Lab Status: No result Specimen: Blood     Comprehensive metabolic panel [[id number]]     Lab Status: No result Specimen: Blood                    XR ankle 3+ views RIGHT    (Results Pending)   XR tibia fibula 2 views RIGHT    (Results Pending)         Procedures  Procedures      ED Course  ED Course as of 24 1806      1704 hs TnI  0hr(!): 97   1805 Blood Pressure(!): 91/43                                       Medical Decision Making  63 yo-male with PMH of CHF, DBM, ESRD and CVA presents for right ankle swelling and pain for the past 3 days as well as dizziness for the past 2 weeks.    The patient was hypotensive to 91/43 which improved to 137/85 after 250 ml of NS.    EKG was interpreted by me and was relatively unchanged since last visit apart from increased signs of hyperkalemia.    Troponin was doubled compared to his baseline of 45-50.    Admission for observation of troponin was discussed with patient and he elected to stay.     Amount and/or Complexity of Data Reviewed  Labs: ordered. Decision-making details documented in ED Course.  Radiology: ordered and independent interpretation performed.    Risk  OTC drugs.  Prescription drug management.  Decision regarding hospitalization.          Disposition  Final diagnoses:   None     ED Disposition       None          Follow-up Information    None         Patient's Medications   Discharge Prescriptions    No medications on file     No discharge procedures on file.    PDMP Review         Value Time User    PDMP Reviewed  Yes 12/26/2023  3:25 AM Awais Machado MD             ED Provider  Attending physically available and evaluated Asad JACOB Galloway. I managed the patient along with the ED Attending.    Electronically Signed by           Edward Montana DO  07/05/24 8955

## 2024-07-05 NOTE — ASSESSMENT & PLAN NOTE
Patient reports generalized weakness after hemodialysis sessions.  Denied loss of appetite, weight loss, nausea, vomiting, abdominal pain, hematemesis, melena, chest pain, shortness of breath, fever, chills.  Patient is on the waiting list for kidney transplant for ESRD.  Patient will start physical therapy on next Tuesday.  Generalized weakness likely related to multiple comorbidities including ESRD on HD, chronic anemia and CHF.    Plans:  Continue management for underlying conditions  Patient will begin physical therapy on Tuesday  Monitor electrolytes and CBC

## 2024-07-05 NOTE — ASSESSMENT & PLAN NOTE
"Lab Results   Component Value Date    HGBA1C 5.1 01/10/2024       No results for input(s): \"POCGLU\" in the last 72 hours.    Blood Sugar Average: Last 72 hrs:    No home meds for diabetes at present.  Patient on renal diet while inpatient.  "

## 2024-07-05 NOTE — ED ATTENDING ATTESTATION
7/5/2024  I, Rayo Bryson MD, saw and evaluated the patient. I have discussed the patient with the resident/non-physician practitioner and agree with the resident's/non-physician practitioner's findings, Plan of Care, and MDM as documented in the resident's/non-physician practitioner's note, except where noted. All available labs and Radiology studies were reviewed.  I was present for key portions of any procedure(s) performed by the resident/non-physician practitioner and I was immediately available to provide assistance.       At this point I agree with the current assessment done in the Emergency Department.  I have conducted an independent evaluation of this patient a history and physical is as follows:    History    Patient is a 64-year-old male, with a history significant for end-stage renal disease on dialysis (oliguric), CAD, CVA per my review of medical record, who presents to the ED today for evaluation of multiple complaints.  Primarily, patient describes a 3-day history of sudden onset, constant, worsening right ankle pain that began suddenly when he was walking and twisted his ankle.  Patient denies any swelling in his lower extremities beyond baseline prior to this as well as pain in his leg.  Patient denies fall/head strike/injury elsewhere.  Patient also describes a 3-month history of intermittent, possibly exertional dizziness characterized as a lightheadedness/presyncopal sensation.  There is also no associated fever, chills, weakness, numbness, chest pain, dyspnea, abdominal pain, dysuria, rash.  Walking exacerbates the symptoms.  No clear admitting factors.  Patient had his dialysis this morning and patient's son, present in room and friend collateral history, states that patient becomes hypotensive after this.  They took about 8 pounds off of him today.  Patient feels comfortable at home and that he is able to take care of himself.    Per patient's wife, present in room and friend  collateral history, patient is not confused.    Patient is without other concerns at this time.     ROS  Patient denies: Fever; dysphagia; vision change; chest pain; dyspnea; abdominal pain; polyuria; dysuria; rash; weakness; numbness; confusion    Physical Exam    GENERAL APPEARANCE: NAD.  Ill-appearing, chronic appearing  NEURO: Patient is speaking clearly in complete sentences.  Patient is answering appropriately and able follow commands.  Patient is moving all four extremities spontaneously.  No facial droop.    Intact sensation in the superficial and deep peroneal nerves, dorsal lateral cutaneous nerve, saphenous nerve distributions bilaterally   HEENT: PERRL, Moist mucous membranes, external ears normal, nose normal  Neck: No cervical adenopathy  CV: RRR. No murmurs, rubs, gallops  LUNGS: Clear to auscultation: No wheezes, stridor, rhonchi, rales  GI: Abdomen non-distended. Soft. Non-tender and without rebound or guarding   : Deferred at this time  MSK: No deformity.  Bilateral lower extremity edema, slightly more in the right ankle when compared to the left.  No proximal fibular head tenderness to palpation on the right.  Tenderness to palpation of the distal right lower extremity just above the medial malleolus.  Full range of motion of the right ankle, no pain or proportion to exam.  No pain with calf squeeze.  Skin: Warm and dry  Capillary refill: <2 seconds    Patient is currently afebrile, hypotensive on arrival, otherwise stable on recheck    Assessment/Plan/MDM  Ankle pain, lightheadedness  -This presentation is concerning for: Sprain, strain, fracture, ACS, electrolyte abnormality.  No reason to suspect DVT, septic arthritis at this time based on history physical exam.  -Will investigate with cardiac workup, x-ray imaging of the right lower extremity  -Will manage with lidocaine for analgesia, further based on workup/blood pressure as it is trended    ED Course  ED Course as of 07/05/24 1714   Fri  Jul 05, 2024   1536 ECG per my independent interpretation: Normal rate, regular rhythm, no ectopy, abnormal axis, no ST elevations.  Precordial depressions similar to prior   1548 Hemoglobin(!): 10.6  Low, similar to prior - Patient denies melena/blood in stool   1618 HS Troponin 0hr (reflex protocol)(!)   1618 hs TnI 0hr(!): 97         Critical Care Time  Procedures

## 2024-07-05 NOTE — ASSESSMENT & PLAN NOTE
Patient is in the waiting days for kidney transplant.  Has an appointment at [facility] transplant center on next Tuesday.

## 2024-07-05 NOTE — ASSESSMENT & PLAN NOTE
Lab Results   Component Value Date    EGFR 13 07/05/2024    EGFR 13 06/17/2024    EGFR 11 06/03/2024    CREATININE 4.41 (H) 07/05/2024    CREATININE 4.21 (H) 06/17/2024    CREATININE 4.94 (H) 06/03/2024     ESRD secondary to diabetes.  Creatinine trended down to from January.   Baseline creatinine between 4.2-4.9.  Patient has been on hemodialysis 3 times per week (M/W/F) for 4 years.  Patient feels weak and fatigued after hemodialysis sessions.  Patient is on kidney transplant waiting list.  Nephrology consult.

## 2024-07-05 NOTE — ASSESSMENT & PLAN NOTE
Patient reports right ankle pain after falls on Monday.  X-ray right ankle and x-ray right tibia-fibula read by me without any dislocation or fracture.    Plans:  Lidocaine patch and Voltaren gel for right ankle pain  Elevate leg

## 2024-07-06 VITALS
DIASTOLIC BLOOD PRESSURE: 62 MMHG | HEIGHT: 69 IN | RESPIRATION RATE: 16 BRPM | BODY MASS INDEX: 32.13 KG/M2 | OXYGEN SATURATION: 93 % | SYSTOLIC BLOOD PRESSURE: 118 MMHG | WEIGHT: 216.93 LBS | HEART RATE: 93 BPM | TEMPERATURE: 98.5 F

## 2024-07-06 LAB
ANION GAP SERPL CALCULATED.3IONS-SCNC: 11 MMOL/L (ref 4–13)
ATRIAL RATE: 88 BPM
BUN SERPL-MCNC: 41 MG/DL (ref 5–25)
CALCIUM SERPL-MCNC: 8.2 MG/DL (ref 8.4–10.2)
CHLORIDE SERPL-SCNC: 96 MMOL/L (ref 96–108)
CO2 SERPL-SCNC: 29 MMOL/L (ref 21–32)
CREAT SERPL-MCNC: 5.52 MG/DL (ref 0.6–1.3)
ERYTHROCYTE [DISTWIDTH] IN BLOOD BY AUTOMATED COUNT: 15.8 % (ref 11.6–15.1)
GFR SERPL CREATININE-BSD FRML MDRD: 10 ML/MIN/1.73SQ M
GLUCOSE SERPL-MCNC: 100 MG/DL (ref 65–140)
HCT VFR BLD AUTO: 30.7 % (ref 36.5–49.3)
HGB BLD-MCNC: 9.6 G/DL (ref 12–17)
MAGNESIUM SERPL-MCNC: 2 MG/DL (ref 1.9–2.7)
MCH RBC QN AUTO: 31.2 PG (ref 26.8–34.3)
MCHC RBC AUTO-ENTMCNC: 31.3 G/DL (ref 31.4–37.4)
MCV RBC AUTO: 100 FL (ref 82–98)
P AXIS: 57 DEGREES
PHOSPHATE SERPL-MCNC: 6.1 MG/DL (ref 2.3–4.1)
PLATELET # BLD AUTO: 151 THOUSANDS/UL (ref 149–390)
PMV BLD AUTO: 10.9 FL (ref 8.9–12.7)
POTASSIUM SERPL-SCNC: 4.3 MMOL/L (ref 3.5–5.3)
PR INTERVAL: 416 MS
QRS AXIS: 225 DEGREES
QRSD INTERVAL: 174 MS
QT INTERVAL: 452 MS
QTC INTERVAL: 546 MS
RBC # BLD AUTO: 3.08 MILLION/UL (ref 3.88–5.62)
SODIUM SERPL-SCNC: 136 MMOL/L (ref 135–147)
T WAVE AXIS: 59 DEGREES
VENTRICULAR RATE: 88 BPM
WBC # BLD AUTO: 6.19 THOUSAND/UL (ref 4.31–10.16)

## 2024-07-06 PROCEDURE — 99239 HOSP IP/OBS DSCHRG MGMT >30: CPT | Performed by: INTERNAL MEDICINE

## 2024-07-06 PROCEDURE — 80048 BASIC METABOLIC PNL TOTAL CA: CPT

## 2024-07-06 PROCEDURE — 93010 ELECTROCARDIOGRAM REPORT: CPT | Performed by: INTERNAL MEDICINE

## 2024-07-06 PROCEDURE — 83735 ASSAY OF MAGNESIUM: CPT

## 2024-07-06 PROCEDURE — 84100 ASSAY OF PHOSPHORUS: CPT

## 2024-07-06 PROCEDURE — 85027 COMPLETE CBC AUTOMATED: CPT

## 2024-07-06 RX ADMIN — DIVALPROEX SODIUM 250 MG: 125 CAPSULE ORAL at 09:12

## 2024-07-06 RX ADMIN — PRASUGREL 5 MG: 10 TABLET, FILM COATED ORAL at 09:12

## 2024-07-06 RX ADMIN — ASPIRIN 81 MG: 81 TABLET, COATED ORAL at 08:42

## 2024-07-06 RX ADMIN — SEVELAMER HYDROCHLORIDE 2400 MG: 800 TABLET ORAL at 08:42

## 2024-07-06 RX ADMIN — SACUBITRIL AND VALSARTAN 1 TABLET: 24; 26 TABLET, FILM COATED ORAL at 08:42

## 2024-07-06 RX ADMIN — HEPARIN SODIUM 5000 UNITS: 5000 INJECTION INTRAVENOUS; SUBCUTANEOUS at 04:36

## 2024-07-06 NOTE — DISCHARGE INSTR - AVS FIRST PAGE
Dear Asad Galloway,     It was our pleasure to care for you here at [facility].  It is our hope that we were always able to exceed the expected standards for your care during your stay.  You were hospitalized due to dizziness.  You were cared for on the 3rd floor by Salo Suarez MD under the service of Fabiola Price MD with the [facility] who covers for your primary care physician (PCP), Raj Auguste DO, while you were hospitalized.  If you have any questions or concerns related to this hospitalization, you may contact us at .  For follow up as well as any medication refills, we recommend that you follow up with your primary care physician.  A registered nurse will reach out to you by phone within a few days after your discharge to answer any additional questions that you may have after going home.  However, at this time we provide for you here, the most important instructions / recommendations at discharge:     Notable Medication Adjustments -   Take metoprolol 50 mg once daily instead of twice daily on days you are getting dialysis.  No other changes were made to your medications. Please take them as ordered.  Testing Required after Discharge -   none  ** Please contact your PCP to request testing orders for any of the testing recommended here **  Important follow up information -   Please follow up with nephrology following discharge.  Please follow up with your PCP within 1 week of discharge.  Other Instructions -   Please come back to ED for worsening of symptoms.  Please review this entire after visit summary as additional general instructions including medication list, appointments, activity, diet, any pertinent wound care, and other additional recommendations from your care team that may be provided for you.      Sincerely,     Salo Suarez  MD

## 2024-07-06 NOTE — DISCHARGE SUMMARY
[facility]  Discharge- Asad Galloway 1960, 64 y.o. male MRN: [id number]  Unit/Bed#: S -01 Encounter: [id number]  Primary Care Provider: Raj Auguste DO   Date and time admitted to hospital: 7/5/2024  1:48 PM    * Dizziness  Assessment & Plan  Patient reports dizziness with room spinning sensation for a couple of weeks which is exacerbated after hemodialysis sections.  Patient reports 2 falls due to dizziness at home.  Patient on aspirin and prasugrel.  Denied head strikes.  Received 250 cc IV fluid bolus in the ED.    Plans  Orthostatic blood pressure  Continue physical therapy   Reduce metoprolol dose from twice daily to daily on days of hemodialysis        ESRD on dialysis ([facility])  Assessment & Plan  Lab Results   Component Value Date    EGFR 10 07/06/2024    EGFR 13 07/05/2024    EGFR 13 06/17/2024    CREATININE 5.52 (H) 07/06/2024    CREATININE 4.41 (H) 07/05/2024    CREATININE 4.21 (H) 06/17/2024     ESRD secondary to diabetes.  Creatinine trended down to from January.   Baseline creatinine between 4.2-4.9.  Patient has been on hemodialysis 3 times per week (M/W/F) for 4 years.  Patient feels weak and fatigued after hemodialysis sessions.  Patient is on kidney transplant waiting list    CHF (congestive heart failure) ([facility])  Assessment & Plan  Wt Readings from Last 3 Encounters:   07/05/24 98.4 kg (216 lb 14.9 oz)   07/02/24 94.8 kg (209 lb)   05/16/24 94.8 kg (209 lb)       [place] with [place] in [place] [facility] [place] EF of 35 to 40%.  Patient denied any shortness of breath, leg swelling or increased weight.  Following cardiologist outpatient and last visit in May.  Continue home meds metoprolol, Entresto.        Right ankle pain  Assessment & Plan  Patient reports right ankle pain after falls on Monday.  X-ray right ankle and x-ray right tibia-fibula read by me without any dislocation or fracture.    Plans:  Lidocaine patch and Voltaren gel for right ankle  pain      Falls  Assessment & Plan  Patient reports 2 falls on Monday secondary to dizziness at home.  Patient on prasugrel and aspirin.  No head strike.  Patient uses a cane for ambulation.  Patient will start outpatient PT session on next Tuesday.  Hold off PT OT evaluation this time.  Reduce metoprolol dose from twice daily to daily on days of hemodialysis      Elevated troponin  Assessment & Plan  Troponin 97 on admission.  Patient denied any chest pain, shortness of breath, palpitation.  EKG did not appreciate ST segment changes.  Elevated troponin likely associated with hemodialysis.    Generalized weakness  Assessment & Plan  Patient reports generalized weakness after hemodialysis sessions.  Denied loss of appetite, weight loss, nausea, vomiting, abdominal pain, hematemesis, melena, chest pain, shortness of breath, fever, chills.  Patient is on the waiting list for kidney transplant for ESRD.  Patient will start physical therapy on next Tuesday.  Generalized weakness likely related to multiple comorbidities including ESRD on HD, chronic anemia and CHF.    Plans:  Continue management for underlying conditions  Patient will begin physical therapy on Tuesday  Monitor electrolytes and CBC      Anemia  Assessment & Plan  Lab Results   Component Value Date    HGB 9.6 (L) 07/06/2024    HGB 10.6 (L) 07/05/2024    HGB 11.7 (L) 06/17/2024    HGB 11.6 (L) 06/03/2024        Baseline hemoglobin between 10-11  Chronic anemia in the setting of ESRD  Denied any hematemesis, melena, bleeding  Monitor symptoms and CBC    Pre-kidney transplant, listed  Assessment & Plan  Patient is in the waiting days for kidney transplant.  Has an appointment at [facility] transplant center on next Tuesday.    Mixed hyperlipidemia  Assessment & Plan  Continue home med Lipitor    Type 2 diabetes mellitus with chronic kidney disease on chronic dialysis, without long-term current use of insulin (HCC)  Assessment & Plan  Lab Results   Component Value  "Date    HGBA1C 5.1 01/10/2024       No results for input(s): \"POCGLU\" in the last 72 hours.    Blood Sugar Average: Last 72 hrs:    No home meds for diabetes at present.  Patient on renal diet while inpatient.        Medical Problems       Resolved Problems  Date Reviewed: 7/5/2024   None       Discharging Resident: Salo Suarez MD  Discharging Attending: No att. providers found  PCP: Raj Auguste DO  Admission Date:   Admission Orders (From admission, onward)       Ordered        07/05/24 1711  Place in Observation  Once                          Discharge Date: 07/08/24    Consultations During Hospital Stay:  none    Procedures Performed:   none    Significant Findings / Test Results:   none    Incidental Findings:   None    Test Results Pending at Discharge (will require follow up):  Ankle x-ray final reading     Outpatient Tests Requested:  None    Complications: None    Reason for Admission: None    Hospital Course:   Asad Galloway is a 64 y.o. male patient who originally presented to the hospital on 7/5/2024 due to dizziness.  Patient was experiencing dizziness following hemodialysis for past few years, but according to wife it is getting worse past few days.  Patient was having ambulatory difficulties and falls due to dizziness.  Lab work in the ED was unremarkable.  Received 250 cc of fluid in the ED with improvement of symptoms.  Recommended reducing metoprolol to once daily on days of hemodialysis.  Patient has arranged physical therapy at home starting Tuesday.    Patient also complained of right ankle pain.  But no swelling, tenderness, warmth or erythema noted on examination.  X-rays without any dislocations or fractures.  Patient mentioned improvement of symptoms the following day.  Recommended following up with PCP.        Please see above list of diagnoses and related plan for additional information.     Condition at Discharge: good    Discharge Day Visit / " "Exam:   Subjective: Patient was seen and examined at bedside.  No complaints.  Ankle pain has improved.  Able to walk without any dizziness or tendencies to falls.  Vitals: Blood Pressure: 118/62 (07/06/24 1008)  Pulse: 93 (07/06/24 1008)  Temperature: 98.5 °F (36.9 °C) (07/06/24 0708)  Temp Source: Oral (07/06/24 0708)  Respirations: 16 (07/06/24 1008)  Height: 5' 9\" (175.3 cm) (07/05/24 2109)  Weight - Scale: 98.4 kg (216 lb 14.9 oz) (07/05/24 2109)  SpO2: 93 % (07/06/24 1008)  Exam:   Physical Exam  Vitals and nursing note reviewed.   Constitutional:       Appearance: Normal appearance.   HENT:      Head: Normocephalic.      Nose: No congestion.      Mouth/Throat:      Mouth: Mucous membranes are moist.      Pharynx: Oropharynx is clear.   Cardiovascular:      Rate and Rhythm: Normal rate.      Pulses: Normal pulses.   Pulmonary:      Effort: Pulmonary effort is normal.   Musculoskeletal:      Right lower leg: No edema.      Left lower leg: No edema.   Skin:     General: Skin is warm and dry.      Capillary Refill: Capillary refill takes less than 2 seconds.   Neurological:      Mental Status: He is alert and oriented to person, place, and time.          Discussion with Family: Updated  (wife) at bedside.    Discharge instructions/Information to patient and family:   See after visit summary for information provided to patient and family.      Provisions for Follow-Up Care:  See after visit summary for information related to follow-up care and any pertinent home health orders.      Mobility at time of Discharge:   Basic Mobility Inpatient Raw Score: 13  JH-HLM Goal: 4: Move to chair/commode  JH-HLM Achieved: 7: Walk 25 feet or more  HLM Goal achieved. Continue to encourage appropriate mobility.     Disposition:   Home    Planned Readmission: no    Discharge Medications:  See after visit summary for reconciled discharge medications provided to patient and/or family.      **Please Note: This note may " have been constructed using a voice recognition system**       normal...

## 2024-07-08 NOTE — ASSESSMENT & PLAN NOTE
Lab Results   Component Value Date    EGFR 10 07/06/2024    EGFR 13 07/05/2024    EGFR 13 06/17/2024    CREATININE 5.52 (H) 07/06/2024    CREATININE 4.41 (H) 07/05/2024    CREATININE 4.21 (H) 06/17/2024     ESRD secondary to diabetes.  Creatinine trended down to from January.   Baseline creatinine between 4.2-4.9.  Patient has been on hemodialysis 3 times per week (M/W/F) for 4 years.  Patient feels weak and fatigued after hemodialysis sessions.  Patient is on kidney transplant waiting list

## 2024-07-08 NOTE — ASSESSMENT & PLAN NOTE
Lab Results   Component Value Date    HGB 9.6 (L) 07/06/2024    HGB 10.6 (L) 07/05/2024    HGB 11.7 (L) 06/17/2024    HGB 11.6 (L) 06/03/2024        Baseline hemoglobin between 10-11  Chronic anemia in the setting of ESRD  Denied any hematemesis, melena, bleeding  Monitor symptoms and CBC

## 2024-07-08 NOTE — ASSESSMENT & PLAN NOTE
Patient reports 2 falls on Monday secondary to dizziness at home.  Patient on prasugrel and aspirin.  No head strike.  Patient uses a cane for ambulation.  Patient will start outpatient PT session on next Tuesday.  Hold off PT OT evaluation this time.  Reduce metoprolol dose from twice daily to daily on days of hemodialysis

## 2024-07-08 NOTE — ASSESSMENT & PLAN NOTE
Patient is in the waiting days for kidney transplant.  Has an appointment at [facility] transplant center on next Tuesday.

## 2024-07-08 NOTE — ASSESSMENT & PLAN NOTE
Patient reports dizziness with room spinning sensation for a couple of weeks which is exacerbated after hemodialysis sections.  Patient reports 2 falls due to dizziness at home.  Patient on aspirin and prasugrel.  Denied head strikes.  Received 250 cc IV fluid bolus in the ED.    Plans  Orthostatic blood pressure  Continue physical therapy   Reduce metoprolol dose from twice daily to daily on days of hemodialysis

## 2024-07-08 NOTE — ASSESSMENT & PLAN NOTE
Wt Readings from Last 3 Encounters:   07/05/24 98.4 kg (216 lb 14.9 oz)   07/02/24 94.8 kg (209 lb)   05/16/24 94.8 kg (209 lb)       LVEF with echo in February 2023 at [facility] [place] showing EF of 35 to 40%.  Patient denied any shortness of breath, leg swelling or increased weight.  Following cardiologist outpatient and last visit in May.  Continue home meds metoprolol, Entresto.

## 2024-07-08 NOTE — ASSESSMENT & PLAN NOTE
Patient reports right ankle pain after falls on Monday.  X-ray right ankle and x-ray right tibia-fibula read by me without any dislocation or fracture.    Plans:  Lidocaine patch and Voltaren gel for right ankle pain

## 2024-07-09 ENCOUNTER — TRANSITIONAL CARE MANAGEMENT (OUTPATIENT)
Dept: INTERNAL MEDICINE CLINIC | Facility: CLINIC | Age: 64
End: 2024-07-09

## 2024-07-14 ENCOUNTER — APPOINTMENT (EMERGENCY)
Dept: VASCULAR ULTRASOUND | Facility: HOSPITAL | Age: 64
DRG: 299 | End: 2024-07-14
Payer: MEDICARE

## 2024-07-14 ENCOUNTER — APPOINTMENT (EMERGENCY)
Dept: RADIOLOGY | Facility: HOSPITAL | Age: 64
DRG: 299 | End: 2024-07-14
Payer: MEDICARE

## 2024-07-14 ENCOUNTER — HOSPITAL ENCOUNTER (INPATIENT)
Facility: HOSPITAL | Age: 64
LOS: 2 days | Discharge: HOME | DRG: 299 | End: 2024-07-16
Attending: INTERNAL MEDICINE | Admitting: INTERNAL MEDICINE
Payer: MEDICARE

## 2024-07-14 DIAGNOSIS — I10 PRIMARY HYPERTENSION: ICD-10-CM

## 2024-07-14 DIAGNOSIS — N18.6 ESRD (END STAGE RENAL DISEASE) ON DIALYSIS (HCC): ICD-10-CM

## 2024-07-14 DIAGNOSIS — I82.409 DVT (DEEP VENOUS THROMBOSIS) (HCC): Primary | ICD-10-CM

## 2024-07-14 DIAGNOSIS — Z99.2 ESRD (END STAGE RENAL DISEASE) ON DIALYSIS (HCC): ICD-10-CM

## 2024-07-14 PROBLEM — R05.1 ACUTE COUGH: Status: ACTIVE | Noted: 2024-07-14

## 2024-07-14 PROBLEM — I82.451 ACUTE DEEP VEIN THROMBOSIS (DVT) OF RIGHT PERONEAL VEIN (HCC): Status: ACTIVE | Noted: 2024-07-14

## 2024-07-14 LAB
ALBUMIN SERPL BCG-MCNC: 3.8 G/DL (ref 3.5–5)
ALP SERPL-CCNC: 115 U/L (ref 34–104)
ALT SERPL W P-5'-P-CCNC: 8 U/L (ref 7–52)
ANION GAP SERPL CALCULATED.3IONS-SCNC: 13 MMOL/L (ref 4–13)
APTT PPP: 31 SECONDS (ref 23–37)
AST SERPL W P-5'-P-CCNC: 5 U/L (ref 13–39)
BASOPHILS # BLD AUTO: 0.03 THOUSANDS/ÂΜL (ref 0–0.1)
BASOPHILS NFR BLD AUTO: 1 % (ref 0–1)
BILIRUB SERPL-MCNC: 0.57 MG/DL (ref 0.2–1)
BUN SERPL-MCNC: 65 MG/DL (ref 5–25)
CALCIUM SERPL-MCNC: 8.9 MG/DL (ref 8.4–10.2)
CHLORIDE SERPL-SCNC: 94 MMOL/L (ref 96–108)
CO2 SERPL-SCNC: 28 MMOL/L (ref 21–32)
CREAT SERPL-MCNC: 8.33 MG/DL (ref 0.6–1.3)
EOSINOPHIL # BLD AUTO: 0.09 THOUSAND/ÂΜL (ref 0–0.61)
EOSINOPHIL NFR BLD AUTO: 2 % (ref 0–6)
ERYTHROCYTE [DISTWIDTH] IN BLOOD BY AUTOMATED COUNT: 15.6 % (ref 11.6–15.1)
FLUAV RNA RESP QL NAA+PROBE: NEGATIVE
FLUBV RNA RESP QL NAA+PROBE: NEGATIVE
GFR SERPL CREATININE-BSD FRML MDRD: 6 ML/MIN/1.73SQ M
GLUCOSE SERPL-MCNC: 114 MG/DL (ref 65–140)
HCT VFR BLD AUTO: 31.6 % (ref 36.5–49.3)
HGB BLD-MCNC: 9.8 G/DL (ref 12–17)
IMM GRANULOCYTES # BLD AUTO: 0.04 THOUSAND/UL (ref 0–0.2)
IMM GRANULOCYTES NFR BLD AUTO: 1 % (ref 0–2)
INR PPP: 1.01 (ref 0.84–1.19)
LYMPHOCYTES # BLD AUTO: 0.83 THOUSANDS/ÂΜL (ref 0.6–4.47)
LYMPHOCYTES NFR BLD AUTO: 13 % (ref 14–44)
MCH RBC QN AUTO: 31.1 PG (ref 26.8–34.3)
MCHC RBC AUTO-ENTMCNC: 31 G/DL (ref 31.4–37.4)
MCV RBC AUTO: 100 FL (ref 82–98)
MONOCYTES # BLD AUTO: 0.61 THOUSAND/ÂΜL (ref 0.17–1.22)
MONOCYTES NFR BLD AUTO: 10 % (ref 4–12)
NEUTROPHILS # BLD AUTO: 4.6 THOUSANDS/ÂΜL (ref 1.85–7.62)
NEUTS SEG NFR BLD AUTO: 73 % (ref 43–75)
NRBC BLD AUTO-RTO: 0 /100 WBCS
PLATELET # BLD AUTO: 144 THOUSANDS/UL (ref 149–390)
PMV BLD AUTO: 10.1 FL (ref 8.9–12.7)
POTASSIUM SERPL-SCNC: 4.9 MMOL/L (ref 3.5–5.3)
PROCALCITONIN SERPL-MCNC: 0.33 NG/ML
PROT SERPL-MCNC: 7.1 G/DL (ref 6.4–8.4)
PROTHROMBIN TIME: 13.9 SECONDS (ref 11.6–14.5)
RBC # BLD AUTO: 3.15 MILLION/UL (ref 3.88–5.62)
RSV RNA RESP QL NAA+PROBE: NEGATIVE
SARS-COV-2 RNA RESP QL NAA+PROBE: NEGATIVE
SODIUM SERPL-SCNC: 135 MMOL/L (ref 135–147)
WBC # BLD AUTO: 6.2 THOUSAND/UL (ref 4.31–10.16)

## 2024-07-14 PROCEDURE — 71046 X-RAY EXAM CHEST 2 VIEWS: CPT

## 2024-07-14 PROCEDURE — 99285 EMERGENCY DEPT VISIT HI MDM: CPT

## 2024-07-14 PROCEDURE — 36415 COLL VENOUS BLD VENIPUNCTURE: CPT | Performed by: EMERGENCY MEDICINE

## 2024-07-14 PROCEDURE — 99222 1ST HOSP IP/OBS MODERATE 55: CPT | Performed by: INTERNAL MEDICINE

## 2024-07-14 PROCEDURE — 93971 EXTREMITY STUDY: CPT

## 2024-07-14 PROCEDURE — 93971 EXTREMITY STUDY: CPT | Performed by: SURGERY

## 2024-07-14 PROCEDURE — 84145 PROCALCITONIN (PCT): CPT

## 2024-07-14 PROCEDURE — 80053 COMPREHEN METABOLIC PANEL: CPT | Performed by: EMERGENCY MEDICINE

## 2024-07-14 PROCEDURE — 83036 HEMOGLOBIN GLYCOSYLATED A1C: CPT

## 2024-07-14 PROCEDURE — 85730 THROMBOPLASTIN TIME PARTIAL: CPT | Performed by: EMERGENCY MEDICINE

## 2024-07-14 PROCEDURE — 99291 CRITICAL CARE FIRST HOUR: CPT | Performed by: EMERGENCY MEDICINE

## 2024-07-14 PROCEDURE — 0241U HB NFCT DS VIR RESP RNA 4 TRGT: CPT

## 2024-07-14 PROCEDURE — 85610 PROTHROMBIN TIME: CPT | Performed by: EMERGENCY MEDICINE

## 2024-07-14 PROCEDURE — 85025 COMPLETE CBC W/AUTO DIFF WBC: CPT | Performed by: EMERGENCY MEDICINE

## 2024-07-14 RX ORDER — ATORVASTATIN CALCIUM 40 MG/1
40 TABLET, FILM COATED ORAL
Status: DISCONTINUED | OUTPATIENT
Start: 2024-07-15 | End: 2024-07-16 | Stop reason: HOSPADM

## 2024-07-14 RX ORDER — BENZONATATE 100 MG/1
200 CAPSULE ORAL 3 TIMES DAILY
Status: DISCONTINUED | OUTPATIENT
Start: 2024-07-14 | End: 2024-07-15

## 2024-07-14 RX ORDER — GUAIFENESIN 600 MG/1
1200 TABLET, EXTENDED RELEASE ORAL EVERY 12 HOURS SCHEDULED
Status: DISCONTINUED | OUTPATIENT
Start: 2024-07-14 | End: 2024-07-15

## 2024-07-14 RX ORDER — METOPROLOL SUCCINATE 50 MG/1
50 TABLET, EXTENDED RELEASE ORAL 2 TIMES DAILY
Status: DISCONTINUED | OUTPATIENT
Start: 2024-07-14 | End: 2024-07-16 | Stop reason: HOSPADM

## 2024-07-14 RX ORDER — HEPARIN SODIUM 10000 [USP'U]/100ML
3-30 INJECTION, SOLUTION INTRAVENOUS
Status: DISCONTINUED | OUTPATIENT
Start: 2024-07-14 | End: 2024-07-16

## 2024-07-14 RX ORDER — ACETAMINOPHEN 325 MG/1
650 TABLET ORAL EVERY 6 HOURS PRN
Status: DISCONTINUED | OUTPATIENT
Start: 2024-07-14 | End: 2024-07-16 | Stop reason: HOSPADM

## 2024-07-14 RX ORDER — ONDANSETRON 2 MG/ML
4 INJECTION INTRAMUSCULAR; INTRAVENOUS EVERY 6 HOURS PRN
Status: DISCONTINUED | OUTPATIENT
Start: 2024-07-14 | End: 2024-07-16 | Stop reason: HOSPADM

## 2024-07-14 RX ORDER — PRASUGREL 10 MG/1
5 TABLET, FILM COATED ORAL DAILY
Status: DISCONTINUED | OUTPATIENT
Start: 2024-07-15 | End: 2024-07-16 | Stop reason: HOSPADM

## 2024-07-14 RX ORDER — DIVALPROEX SODIUM 125 MG/1
250 CAPSULE, COATED PELLETS ORAL EVERY 12 HOURS SCHEDULED
Status: DISCONTINUED | OUTPATIENT
Start: 2024-07-14 | End: 2024-07-16 | Stop reason: HOSPADM

## 2024-07-14 RX ADMIN — BENZONATATE 200 MG: 100 CAPSULE ORAL at 21:43

## 2024-07-14 RX ADMIN — DIVALPROEX SODIUM 250 MG: 125 CAPSULE ORAL at 22:48

## 2024-07-14 RX ADMIN — HEPARIN SODIUM 18 UNITS/KG/HR: 10000 INJECTION, SOLUTION INTRAVENOUS at 19:29

## 2024-07-14 NOTE — ED NOTES
Pt screaming, yelling and cursing in room and refusing covid test. Provider made aware.     Mic Fitzpatrick  07/14/24 1277

## 2024-07-14 NOTE — ED PROVIDER NOTES
History  Chief Complaint   Patient presents with    Flu Symptoms     Pt with fever,cough and sneezing since this am, pt does dialyses x3 a week, due for one tomorrow, +N/V, denies diarrhea and or constipation      Patient is a 64-year-old male with a history of diabetes, and stage renal disease on hemodialysis who presents with subjective fever, cough and sneezing since this morning.  Patient denies chest pain, shortness of breath.  He did receive Tylenol around noon for generalized myalgias.  He receives dialysis Monday, Wednesday and Friday and did receive his last treatment on Friday, 7/12/2024.  He denies known sick contacts.  He admits to right lower extremity edema, which has been present for about 1 week.  He states that he injured his right lower extremity and was evaluated in the ED.  X-ray was negative at that time.  Significant other states that swelling has not worsened but has not improved either.      History provided by:  Patient  Flu Symptoms  Presenting symptoms: cough    Presenting symptoms: no fever, no shortness of breath, no sore throat and no vomiting    Chronicity:  New  Ineffective treatments:  None tried  Associated symptoms: no chills and no ear pain        Prior to Admission Medications   Prescriptions Last Dose Informant Patient Reported? Taking?   Blood Glucose Monitoring Suppl (True Metrix Meter) w/Device KIT  Child No No   Sig: Use to test blood sugars 3 times daily   Blood Pressure Monitoring (BLOOD PRESSURE CUFF) MISC  Child No No   Sig: Use to check blood pressure before taking blood pressure medication and 1 hour after and follow instructions provided in discharge instructions based on the readings.   Lancets MISC  Child No No   Sig: Use 3 (three) times a day Use 3 times daily   Sevelamer Carbonate 2.4 g PACK  Child Yes No   Sig: VIGOROUSLY MIX CONTENTS OF 1 PACKET IN WATER (IT WILL NOT DISSOLVE) AND DRINK 3 TIMES DAILY WITH MEALS   Vitamin D3 1.25 MG (83468 UT) capsule  Child No  No   Sig: TAKE 1 CAPSULE BY MOUTH ONE TIME PER WEEK   aspirin (ECOTRIN LOW STRENGTH) 81 mg EC tablet  Child Yes No   Sig: Take 81 mg by mouth daily Resume on 8/14     atorvastatin (LIPITOR) 40 mg tablet  Child No No   Sig: TAKE 1 TABLET BY MOUTH DAILY WITH DINNER   divalproex sodium (DEPAKOTE SPRINKLE) 125 MG capsule  Child No No   Sig: TAKE 2 CAPSULES (250 MG TOTAL) BY MOUTH EVERY 12 (TWELVE) HOURS   glucose blood (True Metrix Blood Glucose Test) test strip  Child No No   Sig: Use 1 each 3 (three) times a day Use as instructed   metoprolol succinate (TOPROL-XL) 50 mg 24 hr tablet  Child No No   Sig: TAKE 1 TABLET BY MOUTH TWICE A DAY   prasugrel (EFFIENT) tablet  Child No No   Sig: Take 1 tablet (5 mg total) by mouth daily   sacubitril-valsartan (Entresto) 24-26 MG TABS  Child No No   Sig: Take 1 tablet by mouth in the morning Bedtime      Facility-Administered Medications: None       Past Medical History:   Diagnosis Date    Cerebrovascular accident (CVA) due to thrombosis of left middle cerebral artery (HCC) 07/29/2018    Chronic kidney disease     Coronary artery disease     Diabetes mellitus (HCC)     not on meds    Dialysis patient (HCC)     M-W-F    Fistula     left upper arm for hemodialyis    GERD (gastroesophageal reflux disease)     History of coronary artery stent placement     x3    Hypercholesteremia     Hyperlipidemia     Hypertension     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    Limb alert care status     no BP/IV left arm    Neuropathy     Obesity     Osteomyelitis (HCC)     last assessed 11/4/16-per son not currently    PVC's (premature ventricular contractions)     sees [facility]    Stroke (HCC)     last weeof July 2018 [facility]    TIA (transient ischemic attack) 10/28/2018    Wears dentures     Wears glasses        Past Surgical History:   Procedure Laterality Date    ABDOMINAL SURGERY      CARDIAC CATHETERIZATION N/A 05/02/2022    Procedure: Cardiac Coronary Angiogram;   "Surgeon: Sam Davis MD;  [facility];  Service: Cardiology    CARDIAC CATHETERIZATION N/A 05/02/2022    Procedure: Cardiac pci;  Surgeon: Sam Davis MD;  [facility];  Service: Cardiology    CARDIAC CATHETERIZATION  02/01/2023    Procedure: Cardiac catheterization;  Surgeon: Sam Davis MD;  [facility];  Service: Cardiology    CARDIAC CATHETERIZATION N/A 02/01/2023    Procedure: Cardiac pci;  Surgeon: Sam Davis MD;  [facility];  Service: Cardiology    CARDIAC CATHETERIZATION N/A 02/01/2023    Procedure: Cardiac Coronary Angiogram;  Surgeon: Sam Davis MD;  [facility];  Service: Cardiology    CARDIAC CATHETERIZATION N/A 02/01/2023    Procedure: Cardiac other-IVUS;  Surgeon: Sam Davis MD;  [facility];  Service: Cardiology    CHOLECYSTECTOMY      Percutaneous    COLONOSCOPY      CYSTOSCOPY      HEMODIALYSIS ADULT  1/22/2024    HEMODIALYSIS ADULT  1/24/2024    IR LOWER EXTREMITY ANGIOGRAM  9/29/2023    IR LOWER EXTREMITY ANGIOGRAM  2/26/2024    OTHER SURGICAL HISTORY      \"stimulator to control bowel movements\"    KY ESOPHAGOGASTRODUODENOSCOPY TRANSORAL DIAGNOSTIC N/A 09/27/2016    Procedure: ESOPHAGOGASTRODUODENOSCOPY (EGD);  Surgeon: Adele Rowe MD;  Location: [facility];  Service: Gastroenterology    [place] LAPAROSCOPY SURG CHOLECYSTECTOMY N/A 02/29/2016    Procedure: LAPAROSCOPIC CHOLECYSTECTOMY ;  Surgeon: Cliff Roman DO;  Location: [facility];  Service: General    [place] SLCTV CATHJ 3RD+ ORD SLCTV ABDL PEL/LXTR BRNCH Left 9/29/2023    Procedure: Left leg angiogram with intervention;  Surgeon: Michelle Galvan MD;  Location: [facility];  Service: Vascular    [place] SLCTV CATHJ 3RD+ ORD SLCTV ABDL PEL/LXTR BRNCH Left 2/26/2024    Procedure: Left leg angiogram antegrade access, left popliteal angioplasty;  Surgeon: Michelle Galvan MD;  Location: [facility];  Service: Vascular    ROTATOR CUFF " "REPAIR Right     SPINAL CORD STIMULATOR IMPLANT      \"Medtronic interstim model # 3058- in lower back to control bowel movements-currently turned off-battery is dead\"    TOE AMPUTATION Right 10/28/2016    Procedure: 3RD TOE AMPUTATION ;  Surgeon: Anjel Salas DPM;  Location: [facility];  Service:        Family History   Problem Relation Age of Onset    Leukemia Mother     Liver disease Mother     Lung cancer Mother         heavy smoker - 3 ppd    Heart disease Father     Liver disease Father     Diabetes type I Father     Multiple myeloma Sister     Breast cancer Sister     Urolithiasis Family     Alcohol abuse Neg Hx     Depression Neg Hx     Drug abuse Neg Hx     Substance Abuse Neg Hx     Mental illness Neg Hx      I have reviewed and agree with the history as documented.    E-Cigarette/Vaping    E-Cigarette Use Never User      E-Cigarette/Vaping Substances    Nicotine No     THC No     CBD No     Flavoring No     Other No     Unknown No      Social History     Tobacco Use    Smoking status: Never    Smokeless tobacco: Never   Vaping Use    Vaping status: Never Used   Substance Use Topics    Alcohol use: Never    Drug use: No       Review of Systems   Constitutional:  Negative for chills and fever.   HENT:  Positive for sneezing. Negative for ear pain and sore throat.    Eyes:  Negative for pain and visual disturbance.   Respiratory:  Positive for cough. Negative for shortness of breath.    Cardiovascular:  Negative for chest pain and palpitations.   Gastrointestinal:  Negative for abdominal pain and vomiting.   Genitourinary:  Negative for dysuria and hematuria.   Musculoskeletal:  Negative for arthralgias and back pain.   Skin:  Negative for color change and rash.   Neurological:  Negative for seizures and syncope.   All other systems reviewed and are negative.      Physical Exam  Physical Exam  Vitals and nursing note reviewed.   Constitutional:       General: He is not in acute distress.     " Appearance: He is well-developed.   HENT:      Head: Normocephalic and atraumatic.   Eyes:      Conjunctiva/sclera: Conjunctivae normal.   Cardiovascular:      Rate and Rhythm: Normal rate and regular rhythm.      Heart sounds: No murmur heard.  Pulmonary:      Effort: Pulmonary effort is normal. No respiratory distress.      Breath sounds: Normal breath sounds.   Abdominal:      Palpations: Abdomen is soft.      Tenderness: There is no abdominal tenderness.   Musculoskeletal:         General: Swelling and tenderness (right calf) present.      Cervical back: Neck supple.      Right lower leg: Edema present.   Skin:     General: Skin is warm and dry.      Capillary Refill: Capillary refill takes less than 2 seconds.   Neurological:      Mental Status: He is alert.   Psychiatric:         Mood and Affect: Mood normal.         Vital Signs  ED Triage Vitals   Temperature Pulse Respirations Blood Pressure SpO2   07/14/24 1645 07/14/24 1645 07/14/24 1645 07/14/24 1645 07/14/24 1645   97.6 °F (36.4 °C) 80 20 136/60 97 %      Temp Source Heart Rate Source Patient Position - Orthostatic VS BP Location FiO2 (%)   07/14/24 1645 07/14/24 1645 07/14/24 1645 07/14/24 1645 --   Oral Monitor Lying Right arm       Pain Score       07/14/24 2300       No Pain           Vitals:    07/15/24 1700 07/15/24 1715 07/16/24 0716 07/16/24 1150   BP: (!) 114/46 133/64 114/78 94/76   Pulse: 83 87 84    Patient Position - Orthostatic VS:  Lying           Visual Acuity      ED Medications  Medications   Sodium Zirconium Cyclosilicate (Lokelma) 10 g (10 g Oral Given 7/15/24 0912)       Diagnostic Studies  Results Reviewed       Procedure Component Value Units Date/Time    MRSA culture [[id number]]  (Normal) Collected: 07/15/24 1009    Lab Status: Final result Specimen: Nares from Nose Updated: 07/16/24 1707     MRSA Culture Only No Methicillin Resistant Staphlyococcus aureus (MRSA) isolated    Fingerstick Glucose (POCT) [[id number]]  (Normal)  Collected: 07/16/24 1121    Lab Status: Final result Specimen: Blood Updated: 07/16/24 1122     POC Glucose 118 mg/dl     Fingerstick Glucose (POCT) [[id number]]  (Normal) Collected: 07/16/24 0718    Lab Status: Final result Specimen: Blood Updated: 07/16/24 0719     POC Glucose 104 mg/dl     Basic metabolic panel [[id number]]  (Abnormal) Collected: 07/16/24 0445    Lab Status: Final result Specimen: Blood from Arm, Right Updated: 07/16/24 0526     Sodium 134 mmol/L      Potassium 5.0 mmol/L      Chloride 96 mmol/L      CO2 30 mmol/L      ANION GAP 8 mmol/L      BUN 31 mg/dL      Creatinine 5.49 mg/dL      Glucose 110 mg/dL      Calcium 8.6 mg/dL      eGFR 10 ml/min/1.73sq m     Narrative:      National Kidney Disease Foundation guidelines for Chronic Kidney Disease (CKD):     Stage 1 with normal or high GFR (GFR > 90 mL/min/1.73 square meters)    Stage 2 Mild CKD (GFR = 60-89 mL/min/1.73 square meters)    Stage 3A Moderate CKD (GFR = 45-59 mL/min/1.73 square meters)    Stage 3B Moderate CKD (GFR = 30-44 mL/min/1.73 square meters)    Stage 4 Severe CKD (GFR = 15-29 mL/min/1.73 square meters)    Stage 5 End Stage CKD (GFR <15 mL/min/1.73 square meters)  Note: GFR calculation is accurate only with a steady state creatinine    APTT [[id number]]  (Abnormal) Collected: 07/16/24 0054    Lab Status: Final result Specimen: Blood from Arm, Right Updated: 07/16/24 0133     PTT 99 seconds     Fingerstick Glucose (POCT) [[id number]]  (Abnormal) Collected: 07/15/24 2133    Lab Status: Final result Specimen: Blood Updated: 07/15/24 2133     POC Glucose 177 mg/dl     APTT [[id number]]  (Abnormal) Collected: 07/15/24 1610    Lab Status: Final result Specimen: Blood from Central Venous Line Updated: 07/15/24 1641     PTT 74 seconds     Fingerstick Glucose (POCT) [[id number]]  (Normal) Collected: 07/15/24 1515    Lab Status: Final result Specimen: Blood Updated: 07/15/24 1516     POC Glucose 97 mg/dl     TIBC Panel (incl. Iron, TIBC, %  Iron Saturation) [[id number]]  (Abnormal) Collected: 07/15/24 0623    Lab Status: Final result Specimen: Blood from Arm, Right Updated: 07/15/24 1219     Iron Saturation 40 %      TIBC 183 ug/dL      Iron 74 ug/dL      UIBC 109 ug/dL     Fingerstick Glucose (POCT) [[id number]]  (Normal) Collected: 07/15/24 1208    Lab Status: Final result Specimen: Blood Updated: 07/15/24 1209     POC Glucose 113 mg/dl     Folate [[id number]]  (Normal) Collected: 07/15/24 0623    Lab Status: Final result Specimen: Blood from Arm, Right Updated: 07/15/24 1059     Folate 9.3 ng/mL     Vitamin B12 [[id number]]  (Normal) Collected: 07/15/24 0623    Lab Status: Final result Specimen: Blood from Arm, Right Updated: 07/15/24 1059     Vitamin B-12 394 pg/mL     Ferritin [[id number]]  (Abnormal) Collected: 07/15/24 0623    Lab Status: Final result Specimen: Blood from Arm, Right Updated: 07/15/24 1059     Ferritin 1,126 ng/mL     APTT [[id number]]  (Abnormal) Collected: 07/15/24 1009    Lab Status: Final result Specimen: Blood from Arm, Right Updated: 07/15/24 1031     PTT 59 seconds     Fingerstick Glucose (POCT) [[id number]]  (Normal) Collected: 07/15/24 0731    Lab Status: Final result Specimen: Blood Updated: 07/15/24 0732     POC Glucose 98 mg/dl     Comprehensive metabolic panel [[id number]]  (Abnormal) Collected: 07/15/24 0623    Lab Status: Final result Specimen: Blood from Arm, Right Updated: 07/15/24 0652     Sodium 133 mmol/L      Potassium 6.0 mmol/L      Chloride 96 mmol/L      CO2 23 mmol/L      ANION GAP 14 mmol/L      BUN 70 mg/dL      Creatinine 8.75 mg/dL      Glucose 101 mg/dL      Calcium 8.3 mg/dL      AST 6 U/L      ALT 6 U/L      Alkaline Phosphatase 99 U/L      Total Protein 6.6 g/dL      Albumin 3.6 g/dL      Total Bilirubin 0.62 mg/dL      eGFR 5 ml/min/1.73sq m     Narrative:      National Kidney Disease Foundation guidelines for Chronic Kidney Disease (CKD):     Stage 1 with normal or high GFR (GFR > 90 mL/min/1.73 square  meters)    Stage 2 Mild CKD (GFR = 60-89 mL/min/1.73 square meters)    Stage 3A Moderate CKD (GFR = 45-59 mL/min/1.73 square meters)    Stage 3B Moderate CKD (GFR = 30-44 mL/min/1.73 square meters)    Stage 4 Severe CKD (GFR = 15-29 mL/min/1.73 square meters)    Stage 5 End Stage CKD (GFR <15 mL/min/1.73 square meters)  Note: GFR calculation is accurate only with a steady state creatinine    Magnesium [[id number]]  (Normal) Collected: 07/15/24 0623    Lab Status: Final result Specimen: Blood from Arm, Right Updated: 07/15/24 0652     Magnesium 2.1 mg/dL     Phosphorus [[id number]]  (Abnormal) Collected: 07/15/24 0623    Lab Status: Final result Specimen: Blood from Arm, Right Updated: 07/15/24 0652     Phosphorus 5.9 mg/dL     Hemoglobin A1c w/EAG Estimation (Prechecked if no A1C within 90 days) [[id number]]  (Abnormal) Collected: 07/14/24 1901    Lab Status: Final result Specimen: Blood from Arm, Right Updated: 07/15/24 0550     Hemoglobin A1C 5.9 %       mg/dl     Procalcitonin [[id number]]  (Abnormal) Collected: 07/15/24 0450    Lab Status: Final result Specimen: Blood from Arm, Right Updated: 07/15/24 0534     Procalcitonin 0.31 ng/ml     CBC and differential [[id number]]  (Abnormal) Collected: 07/15/24 0450    Lab Status: Final result Specimen: Blood from Arm, Right Updated: 07/15/24 0504     WBC 7.39 Thousand/uL      RBC 2.94 Million/uL      Hemoglobin 9.3 g/dL      Hematocrit 29.0 %      MCV 99 fL      MCH 31.6 pg      MCHC 32.1 g/dL      RDW 15.8 %      MPV 11.0 fL      Platelets 136 Thousands/uL      nRBC 0 /100 WBCs      Segmented % 78 %      Immature Grans % 1 %      Lymphocytes % 11 %      Monocytes % 8 %      Eosinophils Relative 2 %      Basophils Relative 0 %      Absolute Neutrophils 5.75 Thousands/µL      Absolute Immature Grans 0.06 Thousand/uL      Absolute Lymphocytes 0.83 Thousands/µL      Absolute Monocytes 0.61 Thousand/µL      Eosinophils Absolute 0.12 Thousand/µL      Basophils Absolute  0.02 Thousands/µL     Fingerstick Glucose (POCT) [[id number]]  (Normal) Collected: 07/15/24 0202    Lab Status: Final result Specimen: Blood Updated: 07/15/24 0204     POC Glucose 110 mg/dl     APTT [[id number]]  (Abnormal) Collected: 07/15/24 0137    Lab Status: Final result Specimen: Blood from Arm, Right Updated: 07/15/24 0203     PTT 44 seconds     COVID/FLU/RSV [[id number]]  (Normal) Collected: 07/14/24 2137    Lab Status: Final result Specimen: Nares from Nose Updated: 07/14/24 2225     SARS-CoV-2 Negative     INFLUENZA A PCR Negative     INFLUENZA B PCR Negative     RSV PCR Negative    Narrative:      FOR PEDIATRIC PATIENTS - copy/paste COVID Guidelines URL to browser: [email or web address]    SARS-CoV-2 assay is a Nucleic Acid Amplification assay intended for the  qualitative detection of nucleic acid from SARS-CoV-2 in nasopharyngeal  swabs. Results are for the presumptive identification of SARS-CoV-2 RNA.    Positive results are indicative of infection with SARS-CoV-2, the virus  causing COVID-19, but do not rule out bacterial infection or co-infection  with other viruses. Laboratories within the [place] and its  territories are required to report all positive results to the appropriate  public health authorities. Negative results do not preclude SARS-CoV-2  infection and should not be used as the sole basis for treatment or other  patient management decisions. Negative results must be combined with  clinical observations, patient history, and epidemiological information.  This test has not been FDA cleared or approved.    This test has been authorized by FDA under an Emergency Use Authorization  (EUA). This test is only authorized for the duration of time the  declaration that circumstances exist justifying the authorization of the  emergency use of an in vitro diagnostic tests for detection of SARS-CoV-2  virus and/or diagnosis of COVID-19 infection  under section 564(b)(1) of  the Act, 21 U.S.C. 360bbb-3(b)(1), unless the authorization is terminated  or revoked sooner. The test has been validated but independent review by FDA  and CLIA is pending.    Test performed using [facility]: This RT-PCR assay targets N2,  a region unique to SARS-CoV-2. A conserved region in the E-gene was chosen  for pan-Sarbecovirus detection which includes SARS-CoV-2.    According to CMS-2020-01-R, this platform meets the definition of high-throughput technology.    Procalcitonin [[id number]]  (Abnormal) Collected: 07/14/24 1901    Lab Status: Final result Specimen: Blood from Arm, Right Updated: 07/14/24 2213     Procalcitonin 0.33 ng/ml     Comprehensive metabolic panel [[id number]]  (Abnormal) Collected: 07/14/24 1901    Lab Status: Final result Specimen: Blood from Arm, Right Updated: 07/14/24 1938     Sodium 135 mmol/L      Potassium 4.9 mmol/L      Chloride 94 mmol/L      CO2 28 mmol/L      ANION GAP 13 mmol/L      BUN 65 mg/dL      Creatinine 8.33 mg/dL      Glucose 114 mg/dL      Calcium 8.9 mg/dL      AST 5 U/L      ALT 8 U/L      Alkaline Phosphatase 115 U/L      Total Protein 7.1 g/dL      Albumin 3.8 g/dL      Total Bilirubin 0.57 mg/dL      eGFR 6 ml/min/1.73sq m     Narrative:      National Kidney Disease Foundation guidelines for Chronic Kidney Disease (CKD):     Stage 1 with normal or high GFR (GFR > 90 mL/min/1.73 square meters)    Stage 2 Mild CKD (GFR = 60-89 mL/min/1.73 square meters)    Stage 3A Moderate CKD (GFR = 45-59 mL/min/1.73 square meters)    Stage 3B Moderate CKD (GFR = 30-44 mL/min/1.73 square meters)    Stage 4 Severe CKD (GFR = 15-29 mL/min/1.73 square meters)    Stage 5 End Stage CKD (GFR <15 mL/min/1.73 square meters)  Note: GFR calculation is accurate only with a steady state creatinine    APTT [[id number]]  (Normal) Collected: 07/14/24 1901    Lab Status: Final result Specimen: Blood from Arm, Right Updated: 07/14/24 1937     PTT 31 seconds      Protime-INR [[id number]]  (Normal) Collected: 07/14/24 1901    Lab Status: Final result Specimen: Blood from Arm, Right Updated: 07/14/24 1937     Protime 13.9 seconds      INR 1.01    CBC and differential [[id number]]  (Abnormal) Collected: 07/14/24 1901    Lab Status: Final result Specimen: Blood from Arm, Right Updated: 07/14/24 1910     WBC 6.20 Thousand/uL      RBC 3.15 Million/uL      Hemoglobin 9.8 g/dL      Hematocrit 31.6 %       fL      MCH 31.1 pg      MCHC 31.0 g/dL      RDW 15.6 %      MPV 10.1 fL      Platelets 144 Thousands/uL      nRBC 0 /100 WBCs      Segmented % 73 %      Immature Grans % 1 %      Lymphocytes % 13 %      Monocytes % 10 %      Eosinophils Relative 2 %      Basophils Relative 1 %      Absolute Neutrophils 4.60 Thousands/µL      Absolute Immature Grans 0.04 Thousand/uL      Absolute Lymphocytes 0.83 Thousands/µL      Absolute Monocytes 0.61 Thousand/µL      Eosinophils Absolute 0.09 Thousand/µL      Basophils Absolute 0.03 Thousands/µL                    VAS VENOUS DUPLEX -LOWER LIMB UNILATERAL   Final Result by Stanley Holloway MD (07/14 2256)      XR chest 2 views   ED Interpretation by Jamaal Stark DO (07/14 1742)   Cardiomegaly.  No infiltrates.      Final Result by Dena Arguelles MD (07/14 2011)      Question mild pulmonary venous congestion.            Workstation performed: [id number]                    Procedures  CriticalCare Time    Date/Time: 7/14/2024 7:00 PM    Performed by: Jamaal Stark DO  Authorized by: Jamaal Stark DO    Critical care provider statement:     Critical care time (minutes):  30    Critical care time was exclusive of:  Separately billable procedures and treating other patients    Critical care was time spent personally by me on the following activities:  Blood draw for specimens, obtaining history from patient or surrogate, development of treatment plan with patient or surrogate, discussions with consultants,  "evaluation of patient's response to treatment, examination of patient, interpretation of cardiac output measurements, ordering and performing treatments and interventions, ordering and review of laboratory studies, ordering and review of radiographic studies, re-evaluation of patient's condition and review of old charts  Comments:      Patient has DVT, which can be life threatening if propagates to lungs. Discussed case with hematology and patient is not able to take oral medications at this time. He will require IV anticoagulation and observation in the hospital.            ED Course  ED Course as of 07/17/24 0926   Sun Jul 14, 2024   1717 Patient declines viral swab.   1816 Duplex reveals acute DVT.   1825 Message sent to hematology regarding recommendations on anticoagulation.   1851 Dr. Montague, hematology, who recommends starting heparin infusion and transition to Coumadin.  This was discussed with patient and family.                                               Medical Decision Making  Patient presents with cough. He is well appearing and hemodynamically stable. CXR without evidence of pneumonia, pneumothorax, pulmonary edema. Likely viral syndrome, although patient declines viral testing. On exam, patient was found to have right LE edema. He was previously seen for leg injury and negative xray. US today shows acute peroneal DVT. He is unable to take direct Xa inhibitor due to ESRD. I spoke with hematology who recommends heparin infusion and likely transition to coumadin. Will order heparin infusion and hospitalize. Do not suspect PE.     Portions of the above record have been created with voice recognition software.  Occasional wrong word or \"sound alike\" substitutions may have occurred due to the inherent limitations of voice recognition software.  Read the chart carefully and recognize, using context, where substitutions may have occurred.      Problems Addressed:  DVT (deep venous thrombosis) ([facility]):     " Details: Will start heparin infusion  ESRD (end stage renal disease) on dialysis ([facility]):     Details: Patient will require HD on Monday. Nephrology consult.     Amount and/or Complexity of Data Reviewed  External Data Reviewed: radiology and notes.  Labs: ordered.  Radiology: ordered and independent interpretation performed.    Risk  Decision regarding hospitalization.                 Disposition  Final diagnoses:   DVT (deep venous thrombosis) ([facility])   ESRD (end stage renal disease) on dialysis ([facility])     Time reflects when diagnosis was documented in both MDM as applicable and the Disposition within this note       Time User Action Codes Description Comment    7/14/2024  7:18 PM Jamaal Stark Add [I82.409] DVT (deep venous thrombosis) ([facility])     7/14/2024  7:19 PM Jamaal Stark Add [N18.6,  Z99.2] ESRD (end stage renal disease) on dialysis ([facility])     7/16/2024  2:11 PM Michelleshantell Christine Marycamilo Sadaf Add [I10] Primary hypertension           ED Disposition       ED Disposition   Admit    Condition   Stable    Date/Time   Sun Jul 14, 2024  7:18 PM    Comment   Case was discussed with MICKEY and the patient's admission status was agreed to be Admission Status: inpatient status to the service of Dr. Hernandez .               Follow-up Information       Follow up With Specialties Details Why Contact Info    Raj Auguste DO Internal Medicine Follow up  [address] [address]  [phone]      Randolph Walter DO Hematology and Oncology, Internal Medicine Follow up  [address] [facility] [address]  [phone]              Discharge Medication List as of 7/16/2024  2:58 PM        START taking these medications    Details   apixaban (ELIQUIS) 5 mg Take 1 tablet (5 mg total) by mouth 2 (two) times a day, Starting Tue 7/16/2024, Normal      calcitriol (ROCALTROL) 0.5 MCG capsule Take 1 capsule (0.5 mcg total) by mouth 3 (three) times a week, Starting Wed 7/17/2024, Normal      cinacalcet (SENSIPAR) 60 MG  tablet Take 2 tablets (120 mg total) by mouth 3 (three) times a week, Starting Wed 7/17/2024, Normal           CONTINUE these medications which have CHANGED    Details   metoprolol succinate (TOPROL-XL) 50 mg 24 hr tablet Take 1 tablet (50 mg total) by mouth 2 (two) times a day, Starting Tue 7/16/2024, Normal      sacubitril-valsartan (Entresto) 24-26 MG TABS Take 1 tablet by mouth in the morning, Starting Tue 7/16/2024, Normal           CONTINUE these medications which have NOT CHANGED    Details   atorvastatin (LIPITOR) 40 mg tablet TAKE 1 TABLET BY MOUTH DAILY WITH DINNER, Starting Thu 2/8/2024, Normal      Blood Glucose Monitoring Suppl (True Metrix Meter) w/Device KIT Use to test blood sugars 3 times daily, Normal      Blood Pressure Monitoring (BLOOD PRESSURE CUFF) MISC Use to check blood pressure before taking blood pressure medication and 1 hour after and follow instructions provided in discharge instructions based on the readings., Print      divalproex sodium (DEPAKOTE SPRINKLE) 125 MG capsule TAKE 2 CAPSULES (250 MG TOTAL) BY MOUTH EVERY 12 (TWELVE) HOURS, Starting Mon 2/12/2024, Normal      glucose blood (True Metrix Blood Glucose Test) test strip Use 1 each 3 (three) times a day Use as instructed, Starting Tue 1/9/2024, Normal      Lancets MISC Use 3 (three) times a day Use 3 times daily, Starting Tue 1/9/2024, Normal      prasugrel (EFFIENT) tablet Take 1 tablet (5 mg total) by mouth daily, Starting Fri 5/3/2024, Normal      Sevelamer Carbonate 2.4 g PACK VIGOROUSLY MIX CONTENTS OF 1 PACKET IN WATER (IT WILL NOT DISSOLVE) AND DRINK 3 TIMES DAILY WITH MEALS, Historical Med      Vitamin D3 1.25 MG (20833 UT) capsule TAKE 1 CAPSULE BY MOUTH ONE TIME PER WEEK, Normal           STOP taking these medications       aspirin (ECOTRIN LOW STRENGTH) 81 mg EC tablet Comments:   Reason for Stopping:                   PDMP Review         Value Time User    PDMP Reviewed  Yes 12/26/2023  3:25 AM Awais Machado  MD            ED Provider  Electronically Signed by             Jamaal Stark,   07/17/24 0972

## 2024-07-15 ENCOUNTER — APPOINTMENT (INPATIENT)
Dept: DIALYSIS | Facility: HOSPITAL | Age: 64
DRG: 299 | End: 2024-07-15
Payer: MEDICARE

## 2024-07-15 LAB
ALBUMIN SERPL BCG-MCNC: 3.6 G/DL (ref 3.5–5)
ALP SERPL-CCNC: 99 U/L (ref 34–104)
ALT SERPL W P-5'-P-CCNC: 6 U/L (ref 7–52)
ANION GAP SERPL CALCULATED.3IONS-SCNC: 14 MMOL/L (ref 4–13)
APTT PPP: 44 SECONDS (ref 23–37)
APTT PPP: 59 SECONDS (ref 23–37)
APTT PPP: 74 SECONDS (ref 23–37)
AST SERPL W P-5'-P-CCNC: 6 U/L (ref 13–39)
BASOPHILS # BLD AUTO: 0.02 THOUSANDS/ÂΜL (ref 0–0.1)
BASOPHILS NFR BLD AUTO: 0 % (ref 0–1)
BILIRUB SERPL-MCNC: 0.62 MG/DL (ref 0.2–1)
BUN SERPL-MCNC: 70 MG/DL (ref 5–25)
CALCIUM SERPL-MCNC: 8.3 MG/DL (ref 8.4–10.2)
CHLORIDE SERPL-SCNC: 96 MMOL/L (ref 96–108)
CO2 SERPL-SCNC: 23 MMOL/L (ref 21–32)
CREAT SERPL-MCNC: 8.75 MG/DL (ref 0.6–1.3)
DME PARACHUTE DELIVERY DATE REQUESTED: NORMAL
DME PARACHUTE ITEM DESCRIPTION: NORMAL
DME PARACHUTE ORDER STATUS: NORMAL
DME PARACHUTE SUPPLIER NAME: NORMAL
DME PARACHUTE SUPPLIER PHONE: NORMAL
EOSINOPHIL # BLD AUTO: 0.12 THOUSAND/ÂΜL (ref 0–0.61)
EOSINOPHIL NFR BLD AUTO: 2 % (ref 0–6)
ERYTHROCYTE [DISTWIDTH] IN BLOOD BY AUTOMATED COUNT: 15.8 % (ref 11.6–15.1)
EST. AVERAGE GLUCOSE BLD GHB EST-MCNC: 123 MG/DL
FERRITIN SERPL-MCNC: 1126 NG/ML (ref 24–336)
FOLATE SERPL-MCNC: 9.3 NG/ML
GFR SERPL CREATININE-BSD FRML MDRD: 5 ML/MIN/1.73SQ M
GLUCOSE SERPL-MCNC: 101 MG/DL (ref 65–140)
GLUCOSE SERPL-MCNC: 110 MG/DL (ref 65–140)
GLUCOSE SERPL-MCNC: 113 MG/DL (ref 65–140)
GLUCOSE SERPL-MCNC: 177 MG/DL (ref 65–140)
GLUCOSE SERPL-MCNC: 97 MG/DL (ref 65–140)
GLUCOSE SERPL-MCNC: 98 MG/DL (ref 65–140)
HBA1C MFR BLD: 5.9 %
HCT VFR BLD AUTO: 29 % (ref 36.5–49.3)
HGB BLD-MCNC: 9.3 G/DL (ref 12–17)
IMM GRANULOCYTES # BLD AUTO: 0.06 THOUSAND/UL (ref 0–0.2)
IMM GRANULOCYTES NFR BLD AUTO: 1 % (ref 0–2)
IRON SATN MFR SERPL: 40 % (ref 15–50)
IRON SERPL-MCNC: 74 UG/DL (ref 50–212)
LYMPHOCYTES # BLD AUTO: 0.83 THOUSANDS/ÂΜL (ref 0.6–4.47)
LYMPHOCYTES NFR BLD AUTO: 11 % (ref 14–44)
MAGNESIUM SERPL-MCNC: 2.1 MG/DL (ref 1.9–2.7)
MCH RBC QN AUTO: 31.6 PG (ref 26.8–34.3)
MCHC RBC AUTO-ENTMCNC: 32.1 G/DL (ref 31.4–37.4)
MCV RBC AUTO: 99 FL (ref 82–98)
MONOCYTES # BLD AUTO: 0.61 THOUSAND/ÂΜL (ref 0.17–1.22)
MONOCYTES NFR BLD AUTO: 8 % (ref 4–12)
NEUTROPHILS # BLD AUTO: 5.75 THOUSANDS/ÂΜL (ref 1.85–7.62)
NEUTS SEG NFR BLD AUTO: 78 % (ref 43–75)
NRBC BLD AUTO-RTO: 0 /100 WBCS
PHOSPHATE SERPL-MCNC: 5.9 MG/DL (ref 2.3–4.1)
PLATELET # BLD AUTO: 136 THOUSANDS/UL (ref 149–390)
PMV BLD AUTO: 11 FL (ref 8.9–12.7)
POTASSIUM SERPL-SCNC: 6 MMOL/L (ref 3.5–5.3)
PROCALCITONIN SERPL-MCNC: 0.31 NG/ML
PROT SERPL-MCNC: 6.6 G/DL (ref 6.4–8.4)
RBC # BLD AUTO: 2.94 MILLION/UL (ref 3.88–5.62)
SODIUM SERPL-SCNC: 133 MMOL/L (ref 135–147)
TIBC SERPL-MCNC: 183 UG/DL (ref 250–450)
UIBC SERPL-MCNC: 109 UG/DL (ref 155–355)
VIT B12 SERPL-MCNC: 394 PG/ML (ref 180–914)
WBC # BLD AUTO: 7.39 THOUSAND/UL (ref 4.31–10.16)

## 2024-07-15 PROCEDURE — 83540 ASSAY OF IRON: CPT

## 2024-07-15 PROCEDURE — 80053 COMPREHEN METABOLIC PANEL: CPT

## 2024-07-15 PROCEDURE — 85025 COMPLETE CBC W/AUTO DIFF WBC: CPT

## 2024-07-15 PROCEDURE — 97163 PT EVAL HIGH COMPLEX 45 MIN: CPT

## 2024-07-15 PROCEDURE — 84100 ASSAY OF PHOSPHORUS: CPT

## 2024-07-15 PROCEDURE — 82746 ASSAY OF FOLIC ACID SERUM: CPT

## 2024-07-15 PROCEDURE — 82948 REAGENT STRIP/BLOOD GLUCOSE: CPT

## 2024-07-15 PROCEDURE — 83550 IRON BINDING TEST: CPT

## 2024-07-15 PROCEDURE — 83735 ASSAY OF MAGNESIUM: CPT

## 2024-07-15 PROCEDURE — 85730 THROMBOPLASTIN TIME PARTIAL: CPT | Performed by: HOSPITALIST

## 2024-07-15 PROCEDURE — 90935 HEMODIALYSIS ONE EVALUATION: CPT | Performed by: PHYSICIAN ASSISTANT

## 2024-07-15 PROCEDURE — 82607 VITAMIN B-12: CPT

## 2024-07-15 PROCEDURE — 82728 ASSAY OF FERRITIN: CPT

## 2024-07-15 PROCEDURE — 87081 CULTURE SCREEN ONLY: CPT | Performed by: INTERNAL MEDICINE

## 2024-07-15 PROCEDURE — 84145 PROCALCITONIN (PCT): CPT

## 2024-07-15 PROCEDURE — 97116 GAIT TRAINING THERAPY: CPT

## 2024-07-15 PROCEDURE — 99223 1ST HOSP IP/OBS HIGH 75: CPT | Performed by: INTERNAL MEDICINE

## 2024-07-15 PROCEDURE — 85730 THROMBOPLASTIN TIME PARTIAL: CPT | Performed by: INTERNAL MEDICINE

## 2024-07-15 RX ORDER — GUAIFENESIN 100 MG/5ML
200 SOLUTION ORAL EVERY 4 HOURS PRN
Status: DISCONTINUED | OUTPATIENT
Start: 2024-07-15 | End: 2024-07-15

## 2024-07-15 RX ORDER — ASPIRIN 81 MG/1
81 TABLET, CHEWABLE ORAL DAILY
Status: DISCONTINUED | OUTPATIENT
Start: 2024-07-15 | End: 2024-07-16

## 2024-07-15 RX ORDER — GUAIFENESIN 100 MG/5ML
400 SOLUTION ORAL EVERY 4 HOURS PRN
Status: DISCONTINUED | OUTPATIENT
Start: 2024-07-15 | End: 2024-07-16 | Stop reason: HOSPADM

## 2024-07-15 RX ADMIN — DIVALPROEX SODIUM 250 MG: 125 CAPSULE ORAL at 09:12

## 2024-07-15 RX ADMIN — ASPIRIN 81 MG 81 MG: 81 TABLET ORAL at 12:37

## 2024-07-15 RX ADMIN — Medication 1000 UNITS: at 09:06

## 2024-07-15 RX ADMIN — SEVELAMER HYDROCHLORIDE 2400 MG: 800 TABLET ORAL at 09:22

## 2024-07-15 RX ADMIN — ONDANSETRON 4 MG: 2 INJECTION INTRAMUSCULAR; INTRAVENOUS at 22:01

## 2024-07-15 RX ADMIN — SODIUM ZIRCONIUM CYCLOSILICATE 10 G: 10 POWDER, FOR SUSPENSION ORAL at 09:12

## 2024-07-15 RX ADMIN — SEVELAMER HYDROCHLORIDE 2400 MG: 800 TABLET ORAL at 17:59

## 2024-07-15 RX ADMIN — HEPARIN SODIUM 20 UNITS/KG/HR: 10000 INJECTION, SOLUTION INTRAVENOUS at 09:21

## 2024-07-15 RX ADMIN — METOPROLOL SUCCINATE 50 MG: 50 TABLET, EXTENDED RELEASE ORAL at 17:59

## 2024-07-15 RX ADMIN — GUAIFENESIN 400 MG: 200 SOLUTION ORAL at 02:24

## 2024-07-15 RX ADMIN — ATORVASTATIN CALCIUM 40 MG: 40 TABLET, FILM COATED ORAL at 17:58

## 2024-07-15 RX ADMIN — SACUBITRIL AND VALSARTAN 1 TABLET: 24; 26 TABLET, FILM COATED ORAL at 17:59

## 2024-07-15 RX ADMIN — SEVELAMER HYDROCHLORIDE 2400 MG: 800 TABLET ORAL at 12:37

## 2024-07-15 RX ADMIN — PRASUGREL 5 MG: 10 TABLET, FILM COATED ORAL at 09:53

## 2024-07-15 RX ADMIN — GUAIFENESIN 400 MG: 200 SOLUTION ORAL at 09:22

## 2024-07-15 NOTE — WOUND OSTOMY CARE
Consult Note - Wound   Asad Galloway 64 y.o. male MRN: [id number]  Unit/Bed#: S -01 Encounter: [id number]        History and Present Illness:  Patient is a 63 yo male that was admitted to [facility]  for treatment of Acute deep vein thrombosis (DVT) of right peroneal vein. Patient has a PMH of T2DM, ESRD on HD, history of CVA of left middle cerebral artery without deficits. Patient is alert and oriented times three and agreeable to assessment. Patient is assist of one for turning and repositioning. Patient is continent of bowel and bladder. On assessment, patient is OOB to chair, with wife at bedside.    Wound Care was consulted for left heel. Patient follows with outpatient wound care center.     Assessment Findings:   Right  heel is dry, intact and blanches with no skin loss or wounds present. Recommend preventative Hydraguard Cream and proper offloading/ repositioning.      B/L sacro-buttocks is dry, intact, pink in color and blanches. No skin loss or wounds present. Recommend preventative hydragaurd to area.     POA left heel diabetic ulcer - small irregular shaped wound. Wound is covered in 100% dried yellow well adhered tissue. No open aspects or drainage noted. Lani wound is dry and scaly.     No induration, fluctuance, odor, warmth/temperature differences, redness, or purulence noted to the above noted wounds and skin areas assessed. New dressings applied per orders listed below. Patient tolerated well- no s/s of non-verbal pain or discomfort observed during the encounter. Bedside nurse aware of plan of care. See flow sheets for more detailed assessment findings.      Orders listed below and wound care will continue to follow, call or Secure Chat with questions.     Skin care plans:  1-Hydraguard to bilateral sacrum, buttock and R heel  BID and PRN  2-Elevate heels to offload pressure.  3-Ehob cushion in chair when out of bed.  4-Moisturize skin daily with skin nourishing cream.  5-Turn/reposition q2h for  pressure re-distribution on skin.  6- Left heel: irrigate with NSS. Pat dry. Cover with small square silicone foam dressing. Change every other day    Wounds:  Wound 07/15/24 Heel Left (Active)   Wound Image   07/15/24 1131   Wound Description Dry;Yellow 07/15/24 1131   Lani-wound Assessment Callus;Dry 07/15/24 1131   Wound Length (cm) 0.5 cm 07/15/24 1131   Wound Width (cm) 0.5 cm 07/15/24 1131   Wound Depth (cm) 0 cm 07/15/24 1131   Wound Surface Area (cm^2) 0.25 cm^2 07/15/24 1131   Wound Volume (cm^3) 0 cm^3 07/15/24 1131   Calculated Wound Volume (cm^3) 0 cm^3 07/15/24 1131   Drainage Amount None 07/15/24 1131   Non-staged Wound Description Not applicable 07/15/24 1131   Treatments Site care;Elevated 07/15/24 1131   Dressing Foam, Silicon (eg. Allevyn, etc) 07/15/24 1131   Wound packed? No 07/15/24 1131   Packing- # removed 0 07/15/24 1131   Packing- # inserted 0 07/15/24 1131   Dressing Changed New 07/15/24 1131   Patient Tolerance Tolerated well 07/15/24 1131   Dressing Status Clean;Dry;Intact 07/15/24 1131               Beatriz Portillo RN, BSN, CCRN

## 2024-07-15 NOTE — ASSESSMENT & PLAN NOTE
Lab Results   Component Value Date    EGFR 6 07/14/2024    EGFR 10 07/06/2024    EGFR 13 07/05/2024    CREATININE 8.33 (H) 07/14/2024    CREATININE 5.52 (H) 07/06/2024    CREATININE 4.41 (H) 07/05/2024     ESRD secondary to diabetes, on HD M/W/F for 4 years, currently on Kidney transplant list  Baseline creatinine between 4-5    Plan:  Nephrology consulted for HD  Continue Sevelamer 2400mg TID

## 2024-07-15 NOTE — ASSESSMENT & PLAN NOTE
Lab Results   Component Value Date    EGFR 5 07/15/2024    EGFR 6 07/14/2024    EGFR 10 07/06/2024    CREATININE 8.75 (H) 07/15/2024    CREATININE 8.33 (H) 07/14/2024    CREATININE 5.52 (H) 07/06/2024     ESRD secondary to diabetes, on HD M/W/F for 4 years, currently on Kidney transplant list  Baseline creatinine between 4-5  Creatinine today is 8.75  Serum sodium is 133  Serum potassium is 6.0    Plan:  Nephrology has seen patient, appreciate recommendations  Given Lokelma 10 g x 1 for hyperkalemia  Plan for hemodialysis today  Continue Sevelamer 2400mg TID  Follow-up BMP in the morning

## 2024-07-15 NOTE — H&P
[facility]  H&P  Name: Asad Galloway 64 y.o. male I MRN: [id number]  Unit/Bed#: S MS Maureen-01 I Date of Admission: 7/14/2024   Date of Service: 7/15/2024 I Hospital Day: 1      Assessment & Plan   * Acute deep vein thrombosis (DVT) of right peroneal vein ([facility])  Assessment & Plan  Incidental DVT finding- originally came in for URI symptoms and was found to have right peroneal DVT   07/14/2024: Right limb venous duplex ultrasound: Evidence of acute vs sub acute deep vein thrombosis within (one) of the paired peroneal veins at the proximal calf  Unprovoked DVT at this time- no history of malignancy, prolonged travel, bedbound status, family/personal history of DVT    Plan:  Continue heparin gtt, transition to Eliquis in the morning   F/u Covid/Flu/RSV     Acute cough  Assessment & Plan  On admission, vital signs stable, not meeting sepsis criteria, with 2-day history nonproductive cough associated with rhinorrhea, sore throat. Family sick at home with sinusitis.   CXR shows increased bilateral infiltrates versus pulmonary congestion, increased right lower lobe consolidation.  Official chest x-ray read stated questionable mild pulmonary congestion.  Ddx includes viral URI vs viral Pneumonia vs bacterial PNA-given symptoms only began yesterday will hold off on antibiotics    Plan:  COVID/flu/RSV swab  F/u Procal now, Procal AM  Tessalon pearls 200 mg 3 times daily  Mucinex 1200 mg twice daily  Hold off on antibiotics until clear bacterial source or if patient meets sepsis criteria    CHF (congestive heart failure) ([facility])  Assessment & Plan  Wt Readings from Last 3 Encounters:   07/14/24 99.8 kg (220 lb 0.3 oz)   07/05/24 98.4 kg (216 lb 14.9 oz)   07/02/24 94.8 kg (209 lb)       LVEF with echo in February 2023 at [facility] showing EF of 35 to 40%.  Patient denied any shortness of breath, leg swelling or increased weight.  Following cardiologist outpatient and last visit in May.  Continue home meds  metoprolol, Entresto.  Strict I/O  Daily weights      ESRD on dialysis (HCC)  Assessment & Plan  Lab Results   Component Value Date    EGFR 6 07/14/2024    EGFR 10 07/06/2024    EGFR 13 07/05/2024    CREATININE 8.33 (H) 07/14/2024    CREATININE 5.52 (H) 07/06/2024    CREATININE 4.41 (H) 07/05/2024     ESRD secondary to diabetes, on HD M/W/F for 4 years, currently on Kidney transplant list  Baseline creatinine between 4-5    Plan:  Nephrology consulted for HD  Continue Sevelamer 2400mg TID    Anemia  Assessment & Plan  Lab Results   Component Value Date    HGB 9.8 (L) 07/14/2024    HGB 9.6 (L) 07/06/2024    HGB 10.6 (L) 07/05/2024    HGB 11.7 (L) 06/17/2024        Baseline hemoglobin between 10-11  Chronic anemia in the setting of ESRD  Denied any hematemesis, melena, bleeding  Monitor symptoms and CBC    Generalized weakness  Assessment & Plan  Reports generalized weakness with URI symptoms, noted to have generalized weakness after HD sessions  Recent hospitalization from 07/04-07/05 for generalized weakness, was discharged with PT  Patient is on the waiting list for kidney transplant for ESRD.  Generalized weakness likely related to multiple comorbidities including ESRD on HD, chronic anemia and CHF, viral URI    Plans:  PT/OT  F/u Covid swab  Monitor for sepsis criteria  Monitor electrolytes and CBC    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 (HCC)  Assessment & Plan  History of LLE angiogram  02/26/2024: Successful angioplasty of mid popliteal stenosis P2 segment with drug coated balloon   Continue Prasugrel and ASA    Nonhealing ulcer of heel (HCC)  Assessment & Plan  History of left foot heel wound, currently stable  Wound care consulted    Mixed hyperlipidemia  Assessment & Plan  Continue Lipitor 40mg qhs    Type 2 diabetes mellitus with chronic kidney disease on chronic dialysis, without long-term current use of insulin (HCC)  Assessment & Plan  Lab Results   Component  "Value Date    HGBA1C 5.1 01/10/2024       No results for input(s): \"POCGLU\" in the last 72 hours.    Blood Sugar Average: Last 72 hrs:  No home medication regimen, controlled with diet  HbgA1c  Regular diet  PCOT glucose checks before meals and bedtime  If blood sugars persistently >200, order sliding scale insulin  Goal -180 while admitted, adjusting insulin regimen as appropriate  Monitor for hypoglycemia and treat per protocol             VTE Pharmacologic Prophylaxis: VTE Score: 9 High Risk (Score >/= 5) - Pharmacological DVT Prophylaxis Ordered: heparin drip. Sequential Compression Devices Ordered.  Code Status: Level 1 - Full Code, discussed with patient and wife  Discussion with family: Updated  (wife) at bedside.    Anticipated Length of Stay: Patient will be admitted on an inpatient basis with an anticipated length of stay of greater than 2 midnights secondary to Acute DVT.    Chief Complaint: cough    History of Present Illness:  Asad Galloway is a 64 y.o. male with a PMH of T2DM, ESRD on HD, history of CVA of left middle cerebral artery without deficits (2018), CHF (EF 35-40%), PAD s/p POBA and DCB (02/2024), who presents with cough and sneezing, but admitted for acute right peroneal DVT.  Patient and wife report cough, sneezing, rhinorrhea, sore throat began yesterday, 07/13.  He was brought in due to worsening nonproductive cough, and increased fatigue.  Reports children at home are sick with sinusitis.  He reports good p.o. intake.  Denies any abdominal pain, nausea, vomiting, blood in the stool, or rash.      Denies any history of malignancy, family or personal history of DVT/PE, long travel, or bedbound status.  Typically walks on his own without cane, but has a cane if needed. Lives at a bilevel home with wife and two adult kids. Denies any fever, chest pain, chest palpitation, SOB.    He makes limited urine, about 50 mL/day, on HD M,W,F with last HD treatment on Friday " 07/12/2024.    In ED, VSS. Work up was signficant for chronic anemia with elevated MCV, chronic thrombocytopenia, elevated BUN and Cr. CXR shows increased mild pulmonary venous congestion. Right limb venous duplex venous ultrasound showed acute vs sub acute DVT in one of the peroneal veins at the proximal calf. In the ED, he was started on heparin gtt.    Review of Systems:  Review of Systems   Constitutional:  Positive for fatigue. Negative for appetite change, chills, fever and unexpected weight change.   HENT:  Positive for rhinorrhea and sore throat. Negative for congestion, ear pain and hearing loss.    Eyes:  Negative for pain and visual disturbance.   Respiratory:  Positive for cough. Negative for shortness of breath and wheezing.    Cardiovascular:  Negative for chest pain, palpitations and leg swelling.   Gastrointestinal:  Negative for abdominal pain, blood in stool, constipation, diarrhea, nausea and vomiting.   Genitourinary:  Negative for dysuria and hematuria.   Musculoskeletal:  Negative for arthralgias and back pain.   Skin:  Negative for color change and rash.   Neurological:  Positive for dizziness and light-headedness. Negative for headaches.       Past Medical and Surgical History:   Past Medical History:   Diagnosis Date    Cerebrovascular accident (CVA) due to thrombosis of left middle cerebral artery (HCC) 07/29/2018    Chronic kidney disease     Coronary artery disease     Diabetes mellitus (HCC)     not on meds    Dialysis patient (HCC)     M-W-F    Fistula     left upper arm for hemodialyis    GERD (gastroesophageal reflux disease)     History of coronary artery stent placement     x3    Hypercholesteremia     Hyperlipidemia     Hypertension     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    Limb alert care status     no BP/IV left arm    Neuropathy     Obesity     Osteomyelitis (HCC)     last assessed 11/4/16-per son not currently    PVC's (premature ventricular contractions)     sees SL  "Cardio    Stroke (HCC)     last weeof July 2018 [facility]    TIA (transient ischemic attack) 10/28/2018    Wears dentures     Wears glasses        Past Surgical History:   Procedure Laterality Date    ABDOMINAL SURGERY      CARDIAC CATHETERIZATION N/A 05/02/2022    Procedure: Cardiac Coronary Angiogram;  Surgeon: Sam Davis MD;  [facility];  Service: Cardiology    CARDIAC CATHETERIZATION N/A 05/02/2022    Procedure: Cardiac pci;  Surgeon: Sam Davis MD;  [facility]: [facility];  Service: Cardiology    CARDIAC CATHETERIZATION  02/01/2023    Procedure: Cardiac catheterization;  Surgeon: Sam Davis MD;  Location: [facility]ARD[facility];  Service: Cardiology    CARDIAC CATHETERIZATION N/A 02/01/2023    Procedure: Cardiac pci;  Surgeon: Sam Davis MD;  [facility]: [facility];  Service: Cardiology    CARDIAC CATHETERIZATION N/A 02/01/2023    Procedure: Cardiac Coronary Angiogram;  Surgeon: Sam Davis MD;  [facility]: [facility];  Service: Cardiology    CARDIAC CATHETERIZATION N/A 02/01/2023    Procedure: Cardiac other-IVUS;  Surgeon: Sam Davis MD;  [facility]: [facility]ARD[facility];  Service: Cardiology    CHOLECYSTECTOMY      Percutaneous    COLONOSCOPY      CYSTOSCOPY      HEMODIALYSIS ADULT  1/22/2024    HEMODIALYSIS ADULT  1/24/2024    IR LOWER EXTREMITY ANGIOGRAM  9/29/2023    IR LOWER EXTREMITY ANGIOGRAM  2/26/2024    OTHER SURGICAL HISTORY      \"stimulator to control bowel movements\"    SC ESOPHAGOGASTRODUODENOSCOPY TRANSORAL DIAGNOSTIC N/A 09/27/2016    Procedure: ESOPHAGOGASTRODUODENOSCOPY (EGD);  Surgeon: Adele Rowe MD;  Location: [facility] GI LAB;  Service: Gastroenterology    SC LAPAROSCOPY SURG CHOLECYSTECTOMY N/A 02/29/2016    Procedure: LAPAROSCOPIC CHOLECYSTECTOMY ;  Surgeon: Cliff Roman DO;  Location: [facility] Main OR;  Service: General    SC SLCTV CATHJ 3RD+ ORD SLCTV ABDL PEL/LXTR BRNCH Left 9/29/2023    Procedure: Left leg angiogram with " "intervention;  Surgeon: Michelle Galvan MD;  Location: [facility] MAIN [facility];  Service: Vascular    DE SLCTV CATHJ 3RD+ ORD SLCTV ABDL PEL/LXTR BRNCH Left 2/26/2024    Procedure: Left leg angiogram antegrade access, left popliteal angioplasty;  Surgeon: Michelle Galvan MD;  Location: [facility];  Service: Vascular    ROTATOR CUFF REPAIR Right     SPINAL CORD STIMULATOR IMPLANT      \"Medtronic interstim model # 3058- in lower back to control bowel movements-currently turned off-battery is dead\"    TOE AMPUTATION Right 10/28/2016    Procedure: 3RD TOE AMPUTATION ;  Surgeon: Anjel Salas DPM;  Location: [facility];  Service:        Meds/Allergies:  Prior to Admission medications    Medication Sig Start Date End Date Taking? Authorizing Provider   aspirin (ECOTRIN LOW STRENGTH) 81 mg EC tablet Take 81 mg by mouth daily Resume on 8/14      Historical Provider, MD   atorvastatin (LIPITOR) 40 mg tablet TAKE 1 TABLET BY MOUTH DAILY WITH DINNER 2/8/24   Raj Auguste DO   benzonatate (TESSALON) 200 MG capsule Take 1 capsule (200 mg total) by mouth 3 (three) times a day as needed for cough  Patient not taking: Reported on 5/16/2024 5/14/24   BRITTANY Elder   Blood Glucose Monitoring Suppl (True Metrix Meter) w/Device KIT Use to test blood sugars 3 times daily 11/24/23   Jaden Espino MD   Blood Pressure Monitoring (BLOOD PRESSURE CUFF) MISC Use to check blood pressure before taking blood pressure medication and 1 hour after and follow instructions provided in discharge instructions based on the readings. 8/13/18   Az Medina MD   divalproex sodium (DEPAKOTE SPRINKLE) 125 MG capsule TAKE 2 CAPSULES (250 MG TOTAL) BY MOUTH EVERY 12 (TWELVE) HOURS 2/12/24   Raj Auguste DO   glucose blood (True Metrix Blood Glucose Test) test strip Use 1 each 3 (three) times a day Use as instructed 1/9/24   Dagoberto Rich PA-C   Lancets MISC Use 3 (three) times a day Use 3 times daily 1/9/24   Dagoberto Rich PA-C   metoprolol " succinate (TOPROL-XL) 50 mg 24 hr tablet TAKE 1 TABLET BY MOUTH TWICE A DAY 3/4/24   Britney Tan MD   prasugrel (EFFIENT) tablet Take 1 tablet (5 mg total) by mouth daily 5/3/24   Britney Tan MD   sacubitril-valsartan (Entresto) 24-26 MG TABS Take 1 tablet by mouth in the morning Bedtime 11/21/23   Avelino Ernst MD   Sevelamer Carbonate 2.4 g PACK VIGOROUSLY MIX CONTENTS OF 1 PACKET IN WATER (IT WILL NOT DISSOLVE) AND DRINK 3 TIMES DAILY WITH MEALS 4/21/23   Historical Provider, MD   sodium hypochlorite (DAKIN'S HALF-STRENGTH) external solution Apply moistened gauze with Dakin to the wound daily.  Patient not taking: Reported on 7/5/2024 4/11/24   Franklin Church DPM   Vitamin D3 1.25 MG (02704 UT) capsule TAKE 1 CAPSULE BY MOUTH ONE TIME PER WEEK 4/17/24   Mary Torres MD     I have reviewed home medications with patient family member.    Allergies: No Known Allergies    Social History:  Marital Status: /Civil Union   Patient Pre-hospital Living Situation: Home, With spouse, With other family member: 2 children  Patient Pre-hospital Level of Mobility: walks with cane  Patient Pre-hospital Diet Restrictions: None  Substance Use History:   Social History     Substance and Sexual Activity   Alcohol Use Never     Social History     Tobacco Use   Smoking Status Never   Smokeless Tobacco Never     Social History     Substance and Sexual Activity   Drug Use No       Family History:  Family History   Problem Relation Age of Onset    Leukemia Mother     Liver disease Mother     Lung cancer Mother         heavy smoker - 3 ppd    Heart disease Father     Liver disease Father     Diabetes type I Father     Multiple myeloma Sister     Breast cancer Sister     Urolithiasis Family     Alcohol abuse Neg Hx     Depression Neg Hx     Drug abuse Neg Hx     Substance Abuse Neg Hx     Mental illness Neg Hx        Physical Exam:     Vitals:   Blood Pressure: 116/64 (07/14/24 2255)  Pulse: 88 (07/14/24  "2255)  Temperature: 98.4 °F (36.9 °C) (07/14/24 2255)  Temp Source: Oral (07/14/24 1645)  Respirations: 18 (07/14/24 1800)  Height: 5' 8\" (172.7 cm) (07/14/24 2038)  Weight - Scale: 94.3 kg (208 lb) (07/14/24 2038)  SpO2: 95 % (07/14/24 2255)    Physical Exam  Vitals and nursing note reviewed.   Constitutional:       General: He is not in acute distress.     Appearance: He is well-developed. He is ill-appearing (chronic).   HENT:      Head: Normocephalic and atraumatic.      Nose: No congestion or rhinorrhea.      Mouth/Throat:      Mouth: Mucous membranes are moist.      Pharynx: No oropharyngeal exudate or posterior oropharyngeal erythema.   Eyes:      Extraocular Movements: Extraocular movements intact.      Conjunctiva/sclera: Conjunctivae normal.      Pupils: Pupils are equal, round, and reactive to light.   Cardiovascular:      Rate and Rhythm: Normal rate and regular rhythm.      Heart sounds: Murmur heard.   Pulmonary:      Effort: Pulmonary effort is normal. No respiratory distress.      Breath sounds: Decreased breath sounds (diffusely decreased, worse in RLL) present. No wheezing, rhonchi or rales.   Abdominal:      General: Bowel sounds are normal. There is distension.      Palpations: Abdomen is soft.      Tenderness: There is no abdominal tenderness. There is no guarding.   Musculoskeletal:         General: No swelling or tenderness.      Cervical back: Neck supple.      Right lower leg: Edema (2+ pitting isolated to foot) present.      Left lower leg: No edema.   Skin:     General: Skin is warm and dry.      Capillary Refill: Capillary refill takes less than 2 seconds.   Neurological:      Mental Status: He is alert.      Cranial Nerves: No cranial nerve deficit.      Sensory: No sensory deficit.      Motor: Weakness (diffuse) present.   Psychiatric:         Mood and Affect: Mood normal.         Additional Data:     Lab Results:  Results from last 7 days   Lab Units 07/14/24  1901   WBC Thousand/uL " 6.20   HEMOGLOBIN g/dL 9.8*   HEMATOCRIT % 31.6*   PLATELETS Thousands/uL 144*   SEGS PCT % 73   LYMPHO PCT % 13*   MONO PCT % 10   EOS PCT % 2     Results from last 7 days   Lab Units 07/14/24  1901   SODIUM mmol/L 135   POTASSIUM mmol/L 4.9   CHLORIDE mmol/L 94*   CO2 mmol/L 28   BUN mg/dL 65*   CREATININE mg/dL 8.33*   ANION GAP mmol/L 13   CALCIUM mg/dL 8.9   ALBUMIN g/dL 3.8   TOTAL BILIRUBIN mg/dL 0.57   ALK PHOS U/L 115*   ALT U/L 8   AST U/L 5*   GLUCOSE RANDOM mg/dL 114     Results from last 7 days   Lab Units 07/14/24  1901   INR  1.01         Lab Results   Component Value Date    HGBA1C 5.1 01/10/2024    HGBA1C 4.9 04/18/2023    HGBA1C 5.5 12/23/2022     Results from last 7 days   Lab Units 07/14/24  1901   PROCALCITONIN ng/ml 0.33*       Lines/Drains:  Invasive Devices       Peripheral Intravenous Line  Duration             Peripheral IV 07/14/24 Distal;Right;Ventral (anterior) Forearm <1 day    Peripheral IV 07/14/24 Right Antecubital <1 day                        Imaging: Reviewed radiology reports from this admission including: chest xray and ultrasound(s) and Personally reviewed the following imaging: chest xray  VAS VENOUS DUPLEX -LOWER LIMB UNILATERAL   Final Result by Stanley Holloway MD (07/14 2256)      XR chest 2 views   ED Interpretation by Jamaal Stark DO (07/14 1742)   Cardiomegaly.  No infiltrates.      Final Result by Dena Arguelles MD (07/14 2011)      Question mild pulmonary venous congestion.            Workstation performed: [id number]             EKG and Other Studies Reviewed on Admission:   EKG: No EKG obtained.    ** Please Note: This note has been constructed using a voice recognition system. **

## 2024-07-15 NOTE — PROCEDURES
HEMODIALYSIS PROCEDURE NOTE  The patient was seen and examined on hemodialysis.  Patient tolerating procedure with stable hemodynamics.  Discussed with dialysis nurse at bedside and we agreed to proceed with UF3L as tolerated  Time: 4 hours  Sodium: 137 Blood flow: 450   Dialyzer: F160 Potassium: 2K Dialysate flow: 800   Access: LUE AVF Bicarbonate: 33 Ultrafiltration goal: 3L   Medications on HD: none

## 2024-07-15 NOTE — PLAN OF CARE
Post-Dialysis RN Treatment Note    Blood Pressure:  Pre 105/44 mm/Hg  Post 133/64 mmHg   EDW  94.6 kg    Weight:  Pre 97.2 kg   Post 94.2 kg   Mode of weight measurement: Standing Scale   Volume Removed  3000 ml    Treatment duration 240 minutes    NS given  No    Treatment shortened? No   Medications given during Rx NA   Estimated Kt/V  Not Applicable   Access type: AV fistula   Access Issues: No    Report called to primary nurse   Yes Patt Holguin RN        Goal of treatment is the removal of 2-3 kg fluid in this treatment session.  Correction of electrolyte imbalance    Problem: METABOLIC, FLUID AND ELECTROLYTES - ADULT  Goal: Electrolytes maintained within normal limits  Description: INTERVENTIONS:  - Monitor labs and assess patient for signs and symptoms of electrolyte imbalances  - Administer electrolyte replacement as ordered  - Monitor response to electrolyte replacements, including repeat lab results as appropriate  - Instruct patient on fluid and nutrition as appropriate  Outcome: Progressing  Goal: Fluid balance maintained  Description: INTERVENTIONS:  - Monitor labs   - Monitor I/O and WT  - Instruct patient on fluid and nutrition as appropriate  - Assess for signs & symptoms of volume excess or deficit  Outcome: Progressing

## 2024-07-15 NOTE — ASSESSMENT & PLAN NOTE
On admission, vital signs stable, not meeting sepsis criteria, with 2-day history nonproductive cough associated with rhinorrhea, sore throat. Family sick at home with sinusitis.   CXR shows increased bilateral infiltrates versus pulmonary congestion, increased right lower lobe consolidation.  Official chest x-ray read stated questionable mild pulmonary congestion.  Ddx includes viral URI vs viral Pneumonia vs bacterial PNA-given symptoms only began yesterday will hold off on antibiotics    Plan:  COVID/flu/RSV swab  F/u Procal now, Procal AM  Tessalon pearls 200 mg 3 times daily  Mucinex 1200 mg twice daily  Hold off on antibiotics until clear bacterial source or if patient meets sepsis criteria

## 2024-07-15 NOTE — ASSESSMENT & PLAN NOTE
Reports generalized weakness with URI symptoms, noted to have generalized weakness after HD sessions  Recent hospitalization from 07/04-07/05 for generalized weakness, was discharged with PT  Patient is on the waiting list for kidney transplant for ESRD.  Generalized weakness likely related to multiple comorbidities including ESRD on HD, chronic anemia and CHF, viral URI    Plans:  PT/OT  F/u Covid swab  Monitor for sepsis criteria  Monitor electrolytes and CBC

## 2024-07-15 NOTE — PLAN OF CARE
Problem: PHYSICAL THERAPY ADULT  Goal: Performs mobility at highest level of function for planned discharge setting.  See evaluation for individualized goals.  Description: Treatment/Interventions: ADL retraining, Functional transfer training, LE strengthening/ROM, Therapeutic exercise, Endurance training, Cognitive reorientation, Patient/family training, Equipment eval/education, Bed mobility, Gait training, Compensatory technique education, Spoke to nursing, Spoke to case management, Family          See flowsheet documentation for full assessment, interventions and recommendations.  Note:    Problem List: Decreased strength, Decreased endurance, Impaired balance, Decreased mobility, Decreased coordination, Impaired judgement, Decreased cognition, Decreased safety awareness  Assessment: Patient seen for Physical Therapy evaluation. Patient admitted with Acute deep vein thrombosis (DVT) of right peroneal vein (HCC).  Comorbidities affecting patient's physical performance include: DM 2, mixed HLD, ESRD on HD, CHF, dizziness, history of stroke, COVID-19, ischemic cardiomyopathy, left heel ulcer now healed per family.  Personal factors affecting patient at time of initial evaluation include: lives in 1 story house, ambulating with assistive device, inability to navigate community distances, inability to navigate level surfaces without external assistance, and inability to perform dynamic tasks in community. Prior to admission, patient was requiring assist for functional mobility with roller walker, requiring assist for functional mobility without assistive device, requiring assist for ADLS, requiring assist for IADLS, living with spouse, son, daughter in a 1 level home with no steps to enter, and ambulating household distance.  Please find objective findings from Physical Therapy assessment regarding body systems outlined above with impairments and limitations including weakness, impaired balance, decreased  endurance, impaired coordination, gait deviations, decreased activity tolerance, decreased functional mobility tolerance, decreased safety awareness, impaired judgement, fall risk, and decreased cognition.  The Barthel Index was used as a functional outcome tool presenting with a score of Barthel Index Score: 30 today indicating marked limitations of functional mobility and ADLS.  Patient's clinical presentation is currently unstable/unpredictable as seen in patient's presentation of vital sign response, varying levels of cognitive performance, increased fall risk, new onset of impairment of functional mobility, decreased endurance, and new onset of weakness. Pt would benefit from continued Physical Therapy treatment to address deficits as defined above and maximize level of functional mobility. As demonstrated by objective findings, the assigned level of complexity for this evaluation is high.The patient's [facility] Basic Mobility Inpatient Short Form Raw Score is 14. A Raw score of greater than 16 suggests the patient may benefit from discharge to post-acute rehabilitation services. Please also refer to the recommendation of the Physical Therapist for safe discharge planning.        Rehab Resource Intensity Level, PT: III (Minimum Resource Intensity)    See flowsheet documentation for full assessment.

## 2024-07-15 NOTE — ASSESSMENT & PLAN NOTE
History of LLE angiogram  02/26/2024: Successful angioplasty of mid popliteal stenosis P2 segment with drug coated balloon   Continue Prasugrel and ASA

## 2024-07-15 NOTE — ASSESSMENT & PLAN NOTE
Wt Readings from Last 3 Encounters:   07/15/24 98.5 kg (217 lb 2.5 oz)   07/05/24 98.4 kg (216 lb 14.9 oz)   07/02/24 94.8 kg (209 lb)       LVEF with echo in February 2023 at [facility] showing EF of 35 to 40%.  Patient denied any shortness of breath, leg swelling or increased weight.  Following cardiologist outpatient and last visit in May.    Plan:  Continue home meds metoprolol, Entresto.  Strict I/O  Daily weights

## 2024-07-15 NOTE — ASSESSMENT & PLAN NOTE
Wt Readings from Last 3 Encounters:   07/14/24 99.8 kg (220 lb 0.3 oz)   07/05/24 98.4 kg (216 lb 14.9 oz)   07/02/24 94.8 kg (209 lb)       LVEF with echo in February 2023 at [facility] showing EF of 35 to 40%.  Patient denied any shortness of breath, leg swelling or increased weight.  Following cardiologist outpatient and last visit in May.  Continue home meds metoprolol, Entresto.  Strict I/O  Daily weights

## 2024-07-15 NOTE — ASSESSMENT & PLAN NOTE
Incidental DVT finding- originally came in for URI symptoms and was found to have right peroneal DVT   07/14/2024: Right limb venous duplex ultrasound: Evidence of acute vs sub acute deep vein thrombosis within (one) of the paired peroneal veins at the proximal calf  Unprovoked DVT at this time- no history of malignancy, prolonged travel, bedbound status, family/personal history of DVT    Plan:  Continue heparin gtt, transition to Eliquis in the morning   F/u Covid/Flu/RSV

## 2024-07-15 NOTE — ASSESSMENT & PLAN NOTE
Patient presented to ED with 2 day history nonproductive cough associated with rhinorrhea, sore throat. vital signs stable, did not meet sepsis criteria, Family sick at home with sinusitis.   CXR shows increased bilateral infiltrates versus pulmonary congestion, increased right lower lobe consolidation.  Official chest x-ray read stated questionable mild pulmonary congestion.  Negative covid/flu/RSV  Procal down trending from 0.33 to 0.31  Patient overall does not look infectious, rhinorrhea improved, non productive cough present.   Likely viral in origin    Plan:  Continue Mucinex 1200 mg twice daily  Routine vital signs   DVT ppx: heparin SQ   Diet: Dysphagia   Dispo: PT consulted  Wound care for decub ulcers

## 2024-07-15 NOTE — ASSESSMENT & PLAN NOTE
Lab Results   Component Value Date    HGBA1C 5.9 (H) 07/14/2024       Recent Labs     07/15/24  0202 07/15/24  0731 07/15/24  1208   POCGLU 110 98 113       Blood Sugar Average: Last 72 hrs:  (P) 107No home medication regimen, controlled with diet  HbgA1c  Regular diet  PCOT glucose checks before meals and bedtime  If blood sugars persistently >200, order sliding scale insulin  Goal -180 while admitted, adjusting insulin regimen as appropriate  Monitor for hypoglycemia and treat per protocol

## 2024-07-15 NOTE — ASSESSMENT & PLAN NOTE
Patient had right lower extremity for the past week, post leg injury for which he reported to ED.  Patient walks on own without cane, uses cane as needed.   07/14/2024 Venous duplex of lower limb showed evidence of acute vs sub acute, occlusive deep vein thrombosis within (one) of the paired peroneal veins at the proximal calf  No history of malignancy, prolonged travel, bedbound status, family/personal history of DVT  Patient started on heparin drip in ED  Covid/flu/RSV negative  Edema improved to near baseline.    Plan:  Continue heparin drip for now  Plan to switch to oral anticoagulation pending price check of Eliquis and Xarelto  Plan for heme/onc consult outpatient, can provide referral if needed

## 2024-07-15 NOTE — ASSESSMENT & PLAN NOTE
Patient presented with right lower extremity for the past week.   Patient walks on own without cane, uses cane as needed.   Venous duplex of lower limb showed evidence of acute vs sub acute, occlusive deep vein thrombosis within (one) of  the paired peroneal veins at the proximal calf  Patient started on heparin in ED.    Plan  Switch from Heparin to Eliquis or Warfarin

## 2024-07-15 NOTE — ASSESSMENT & PLAN NOTE
Lab Results   Component Value Date    HGB 9.8 (L) 07/14/2024    HGB 9.6 (L) 07/06/2024    HGB 10.6 (L) 07/05/2024    HGB 11.7 (L) 06/17/2024        Baseline hemoglobin between 10-11  Chronic anemia in the setting of ESRD  Denied any hematemesis, melena, bleeding  Monitor symptoms and CBC

## 2024-07-15 NOTE — ASSESSMENT & PLAN NOTE
"Lab Results   Component Value Date    HGBA1C 5.1 01/10/2024       No results for input(s): \"POCGLU\" in the last 72 hours.    Blood Sugar Average: Last 72 hrs:  No home medication regimen, controlled with diet  HbgA1c  Regular diet  PCOT glucose checks before meals and bedtime  If blood sugars persistently >200, order sliding scale insulin  Goal -180 while admitted, adjusting insulin regimen as appropriate  Monitor for hypoglycemia and treat per protocol    "

## 2024-07-15 NOTE — PHYSICAL THERAPY NOTE
PHYSICAL THERAPY EVALUATION/TREATMENT    NAME:  Asad Galloway  AGE:   64 y.o.  Mrn:   [id number]  Length Of Stay: 1    ADMIT DX:  DVT (deep venous thrombosis) (Formerly [facility]) [I82.409]  ESRD (end stage renal disease) on dialysis (Formerly [facility]) [N18.6, Z99.2]    Past Medical History:   Diagnosis Date    Cerebrovascular accident (CVA) due to thrombosis of left middle cerebral artery (Formerly [facility]) 07/29/2018    Chronic kidney disease     Coronary artery disease     Diabetes mellitus (HCC)     not on meds    Dialysis patient (Formerly [facility])     M-W-F    Fistula     left upper arm for hemodialyis    GERD (gastroesophageal reflux disease)     History of coronary artery stent placement     x3    Hypercholesteremia     Hyperlipidemia     Hypertension     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    Limb alert care status     no BP/IV left arm    Neuropathy     Obesity     Osteomyelitis ([facility])     last assessed 11/4/16-per son not currently    PVC's (premature ventricular contractions)     sees [facility]    Stroke ([facility])     last weeof July 2018 [facility]    TIA (transient ischemic attack) 10/28/2018    Wears dentures     Wears glasses      Past Surgical History:   Procedure Laterality Date    ABDOMINAL SURGERY      CARDIAC CATHETERIZATION N/A 05/02/2022    Procedure: Cardiac Coronary Angiogram;  Surgeon: Sam Davis MD;  [facility];  Service: Cardiology    CARDIAC CATHETERIZATION N/A 05/02/2022    Procedure: Cardiac pci;  Surgeon: Sam Davis MD;  [facility];  Service: Cardiology    CARDIAC CATHETERIZATION  02/01/2023    Procedure: Cardiac catheterization;  Surgeon: Sam Davis MD;  [facility];  Service: Cardiology    CARDIAC CATHETERIZATION N/A 02/01/2023    Procedure: Cardiac pci;  Surgeon: Sam Davis MD;  [facility];  Service: Cardiology    CARDIAC CATHETERIZATION N/A 02/01/2023    Procedure: Cardiac Coronary Angiogram;  Surgeon: Sam Davis MD;   "[facility]: [facility];  Service: Cardiology    CARDIAC CATHETERIZATION N/A 02/01/2023    Procedure: Cardiac other-IVUS;  Surgeon: Sam Davis MD;  [facility];  Service: Cardiology    CHOLECYSTECTOMY      Percutaneous    COLONOSCOPY      CYSTOSCOPY      HEMODIALYSIS ADULT  1/22/2024    HEMODIALYSIS ADULT  1/24/2024    IR LOWER EXTREMITY ANGIOGRAM  9/29/2023    IR LOWER EXTREMITY ANGIOGRAM  2/26/2024    OTHER SURGICAL HISTORY      \"stimulator to control bowel movements\"    NV ESOPHAGOGASTRODUODENOSCOPY TRANSORAL DIAGNOSTIC N/A 09/27/2016    Procedure: ESOPHAGOGASTRODUODENOSCOPY (EGD);  Surgeon: Adele Rowe MD;  Location: [facility] GI [facility];  Service: Gastroenterology    NV LAPAROSCOPY SURG CHOLECYSTECTOMY N/A 02/29/2016    Procedure: LAPAROSCOPIC CHOLECYSTECTOMY ;  Surgeon: Cliff Roman DO;  Location: [facility];  Service: General    NV SLCTV CATHJ 3RD+ ORD SLCTV ABDL PEL/LXTR BRNCH Left 9/29/2023    Procedure: Left leg angiogram with intervention;  Surgeon: Michelle Galvan MD;  Location: [facility];  Service: Vascular    NV SLCTV CATHJ 3RD+ ORD SLCTV ABDL PEL/LXTR BRNCH Left 2/26/2024    Procedure: Left leg angiogram antegrade access, left popliteal angioplasty;  Surgeon: Michelle Galvan MD;  Location: [facility];  Service: Vascular    ROTATOR CUFF REPAIR Right     SPINAL CORD STIMULATOR IMPLANT      \"Medtronic interstim model # 3058- in lower back to control bowel movements-currently turned off-battery is dead\"    TOE AMPUTATION Right 10/28/2016    Procedure: 3RD TOE AMPUTATION ;  Surgeon: Anjel Salas DPM;  Location: [facility];  Service:       07/15/24 1040   PT Last Visit   PT Visit Date 07/15/24   Note Type   Note type Evaluation   Pain Assessment   Pain Assessment Tool 0-10   Pain Score No Pain   Restrictions/Precautions   Other Precautions Cognitive;Fall Risk;Bed Alarm;Chair Alarm;Multiple lines  (IV; Juan Ramon on room air)   Home Living   Type of Home House   Home " "Layout Two level;Able to live on main level with bedroom/bathroom;Performs ADLs on one level  (No steps to enter; FFSU)   Bathroom Shower/Tub Walk-in shower   Bathroom Toilet Raised   Bathroom Equipment Grab bars around toilet;Grab bars in shower;Shower chair   Bathroom Accessibility Accessible   Home Equipment Cane  (RW; left lower extremity boot/offloading left heel, patient wears at night only)   Additional Comments Patient's son reports that patient was scheduled for outpatient PT 7/18/2024   Prior Function   Level of [place] Needs assistance with ADLs;Needs assistance with functional mobility;Needs assistance with IADLS   Lives With Spouse;Son;Daughter   Receives Help From Family   IADLs Family/Friend/Other provides meals;Family/Friend/Other provides medication management;Family/Friend/Other provides transportation   Falls in the last 6 months 0  (Denies)   Comments Patient ambulates with a roller walker prior to admission, occasional use of cane and occasional no AD; short distances prior to admission   General   Additional Pertinent History Patient is admitted with a cough, sneezing and increased fatigue.  Patient found to have a DVT right lower extremity.   Family/Caregiver Present Yes  (Patient's spouse and son)   Cognition   Overall Cognitive Status Impaired   Arousal/Participation Cooperative   Orientation Level Oriented to person;Oriented to situation;Disoriented to time  (Patient generally oriented to place, knows he is in the hospital but not which hospital)   Memory Decreased recall of precautions;Decreased recall of recent events;Decreased short term memory   Following Commands Follows one step commands with increased time or repetition   Comments At least 2 patient identifiers including name and date of birth   Subjective   Subjective \"Tired\"   RLE Assessment   RLE Assessment WFL  (Grossly 3-3+/5)   LLE Assessment   LLE Assessment WFL  (Grossly 3+ to 4 -/5)   Vision-Basic Assessment   Current " Vision Wears glasses all the time   Bed Mobility   Supine to Sit 3  Moderate assistance   Additional items Assist x 1;Verbal cues;Increased time required;HOB elevated;LE management  (Trunk management)   Transfers   Sit to Stand 3  Moderate assistance   Additional items Assist x 1;Verbal cues;Increased time required;Armrests  (Cues for safe hand placement)   Stand to Sit 4  Minimal assistance   Additional items Armrests;Assist x 1;Verbal cues;Increased time required  (Cues for safe hand placement)   Additional Comments Patient sit with a roller walker for 1 minute   Ambulation/Elevation   Gait pattern Foward flexed;Decreased foot clearance;Decreased R stance;Short stride;Step through pattern  (Mildly impulsive;quick gait speed;generally unsafe)   Gait Assistance 4  Minimal assist   Additional items Assist x 1;Verbal cues;Tactile cues   Assistive Device Rolling walker   Distance 3 to 4 feet bed to chair   Balance   Static Sitting Fair +   Static Standing Fair -  (With RW)   Ambulatory   (P+/F- with roller walker)   Endurance Deficit   Endurance Deficit Yes   Activity Tolerance   Activity Tolerance Patient limited by fatigue;Treatment limited secondary to medical complications (Comment)  (Weakness; limited activity tolerance)   Nurse Made Aware THIERNO Sandoval   Assessment   Problem List Decreased strength;Decreased endurance;Impaired balance;Decreased mobility;Decreased coordination;Impaired judgement;Decreased cognition;Decreased safety awareness   Assessment Patient seen for Physical Therapy evaluation. Patient admitted with Acute deep vein thrombosis (DVT) of right peroneal vein (HCC).  Comorbidities affecting patient's physical performance include: DM 2, mixed HLD, ESRD on HD, CHF, dizziness, history of stroke, COVID-19, ischemic cardiomyopathy, left heel ulcer now healed per family.  Personal factors affecting patient at time of initial evaluation include: lives in 1 story house, ambulating with assistive device,  inability to navigate community distances, inability to navigate level surfaces without external assistance, and inability to perform dynamic tasks in community. Prior to admission, patient was requiring assist for functional mobility with roller walker, requiring assist for functional mobility without assistive device, requiring assist for ADLS, requiring assist for IADLS, living with spouse, son, daughter in a 1 level home with no steps to enter, and ambulating household distance.  Please find objective findings from Physical Therapy assessment regarding body systems outlined above with impairments and limitations including weakness, impaired balance, decreased endurance, impaired coordination, gait deviations, decreased activity tolerance, decreased functional mobility tolerance, decreased safety awareness, impaired judgement, fall risk, and decreased cognition.  The Barthel Index was used as a functional outcome tool presenting with a score of Barthel Index Score: 30 today indicating marked limitations of functional mobility and ADLS.  Patient's clinical presentation is currently unstable/unpredictable as seen in patient's presentation of vital sign response, varying levels of cognitive performance, increased fall risk, new onset of impairment of functional mobility, decreased endurance, and new onset of weakness. Pt would benefit from continued Physical Therapy treatment to address deficits as defined above and maximize level of functional mobility. As demonstrated by objective findings, the assigned level of complexity for this evaluation is high.    The patient's -Located within [facility] Basic Mobility Inpatient Short Form Raw Score is 14. A Raw score of greater than 16 suggests the patient may benefit from discharge to post-acute rehabilitation services. Please also refer to the recommendation of the Physical Therapist for safe discharge planning.   Goals   Patient Goals To go home   STG Expiration Date 07/25/24   Short Term  Goal #1 Patient will: Increase bilateral LE strength 1 grade to facilitate independent mobility, Perform all bed mobility tasks w/ minx1 to improve pt's independence w/ repositioning for decrease risk of skin breakdown, Perform all transfers w/ minx1 consistently from various height surfaces in order to improve I w/ engagement w/ real-world environments/situations, Ambulate at least 100 ft. with least restrictive assistive device w/ supervision w/o LOB to facilitate return and engagement w/ previous living environment, Increase all balance 1 grade to decrease risk for falls, Tolerate at least 15 consecutive minutes of activity to demonstrate improved activity tolerance and endurance, and Tolerate 3 hr OOB to faciliate upright tolerance   Plan   Treatment/Interventions ADL retraining;Functional transfer training;LE strengthening/ROM;Therapeutic exercise;Endurance training;Cognitive reorientation;Patient/family training;Equipment eval/education;Bed mobility;Gait training;Compensatory technique education;Spoke to nursing;Spoke to case management;Family   PT Frequency 3-5x/wk   Discharge Recommendation   Rehab Resource Intensity Level, PT III (Minimum Resource Intensity)   AM-PAC Basic Mobility Inpatient   Turning in Flat Bed Without Bedrails 3   Lying on Back to Sitting on Edge of Flat Bed Without Bedrails 2   Moving Bed to Chair 3   Standing Up From Chair Using Arms 2   Walk in Room 3   Climb 3-5 Stairs With Railing 1   Basic Mobility Inpatient Raw Score 14   Basic Mobility Standardized Score 35.55   [facility] Highest Level Of Mobility   -[facility] Goal 4: Move to chair/commode   -HLM Achieved 6: Walk 10 steps or more   Barthel Index   Feeding 5   Bathing 0   Grooming Score 0   Dressing Score 5   Bladder Score 5   Bowels Score 5   Toilet Use Score 5   Transfers (Bed/Chair) Score 5   Mobility (Level Surface) Score 0   Stairs Score 0   Barthel Index Score 30   Additional Treatment Session   Start Time 1040   End Time  1058   Treatment Assessment Patient agreeable to ambulation with a roller walker.  Patient transfers sit to stand with cues for safe hand placement and min assist.  Patient ambulates with a roller walker 20 feet with change in direction with min assist.  Patient transfers stand to sit with cues for safe hand placement and min assist.  Patient fatigued at end of short distance ambulation.  A: patient with limited tolerance to PT eval and treat 2/2 to fatigue and limited tolerance to minimal activity.  Prior to admission, patient was assist for ADLs and functional mobility with a rolling walker.  While admitted, patient will continue to benefit from skilled physical therapy services to increase his strength, balance, endurance, safe functional mobility and gait with a roller walker.  When medically stable for discharge, patient is appropriate for Level III Minimum Resource Intensity.   End of Consult   Patient Position at End of Consult Bed/Chair alarm activated;Bedside chair;All needs within reach   Licensure   [place] License Number  Kourtney L Emory, PT   Portions of the documentation may have been created using voice recognition software. Occasional wrong word or sound alike substitutions may have occurred due to the inherent limitation of the voice recognition software. Read the chart carefully and recognize, using context, where substitutions have occurred.

## 2024-07-15 NOTE — CASE MANAGEMENT
Case Management Assessment & Discharge Planning Note    Patient name Asad Galloway  Location S /S -01 MRN [id number]  : 1960 Date 7/15/2024       Current Admission Date: 2024  Current Admission Diagnosis:Acute deep vein thrombosis (DVT) of right peroneal vein ([facility])   Patient Active Problem List    Diagnosis Date Noted Date Diagnosed    Acute deep vein thrombosis (DVT) of right peroneal vein ([facility]) 2024     Acute cough 2024     Right ankle pain 2024     CHF (congestive heart failure) (Formerly [facility]) 2024     Peripheral arterial disease ([facility]) 2024     Type 2 diabetes mellitus with diabetic neuropathy, unspecified whether long term insulin use ([facility]) 2024     Ulcer of left heel, limited to breakdown of skin ([facility]) 2024     Hypertensive heart and chronic kidney disease with heart failure and with stage 5 chronic kidney disease, or end stage renal disease ([facility]) 2024     Falls 2024     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 ([facility]) 2024     Chronic systolic heart failure ([facility]) 2024     Hypotension 2023     Edema of left lower extremity 2023     Rectal bleeding 2023     Nonhealing ulcer of heel ([facility]) 2023     Elevated troponin 2023     Presence of external cardiac defibrillator 2023     Diabetic polyneuropathy associated with type 2 diabetes mellitus ([facility]) 2023     Absence of toe of right foot ([facility]) 2023     Hammer toes of both feet 2023     Ischemic cardiomyopathy 2023     Moderate AS (aortic stenosis) 2023     Mood disorder ([facility]) 2023     Old myocardial infarction 2023     SOB (shortness of breath) 10/21/2022     Left arm swelling 10/21/2022     CAD, multiple vessel 2022     Electrolyte abnormality 2022     Chest pain 2022     Generalized weakness 2022     Primary hypertension 2022      Penetrating foot wound, left, initial encounter 01/19/2022     Emotional lability 01/19/2022     History of 2019 novel coronavirus disease (COVID-19) 12/26/2020     ESRD on dialysis (HCC) 12/26/2020     Anemia 10/05/2020     S/P arteriovenous (AV) fistula creation 04/27/2020     Pre-kidney transplant, listed 04/27/2020     Urge incontinence 12/20/2019     Anxiety associated with depression 02/28/2019     History of stroke 10/04/2018     Abnormal EEG 09/24/2018     Other constipation 08/17/2018     GERD (gastroesophageal reflux disease) 08/13/2018     Elevated alkaline phosphatase level 08/03/2018     Nephrotic range proteinuria 03/28/2018     Type 2 diabetes mellitus with chronic kidney disease on chronic dialysis, without long-term current use of insulin (HCC) 02/26/2016     Dizziness 02/26/2016     Mixed hyperlipidemia 02/26/2016     Antiplatelet or antithrombotic long-term use 02/26/2016       LOS (days): 1  Geometric Mean LOS (GMLOS) (days): 4  Days to GMLOS:3.2     OBJECTIVE:    Risk of Unplanned Readmission Score: 29.76         Current admission status: Inpatient       Preferred Pharmacy:   [id number] [place] [facility] [address]  [address] [address]  Phone: [phone] Fax: [phone]    Primary Care Provider: Raj Auguste DO    Primary Insurance: MEDICARE  Secondary Insurance: [employer]    ASSESSMENT:  Active Health Care Proxies    There are no active Health Care Proxies on file.    Readmission Root Cause  30 Day Readmission: No    Patient Information  Admitted from:: Home  Mental Status: Alert  During Assessment patient was accompanied by: Spouse  Assessment information provided by:: Patient, Spouse  Primary Caregiver: Other (Comment)  Caregiver's Name:: wifeCriselda  Caregiver's Relationship to Patient:: Family Member  Support Systems: Spouse/significant other, Family members, Children  County of Residence: [place]  What city do you live in?:  [place]  Home entry access options. Select all that apply.: Stairs  Number of steps to enter home.: 2  Do the steps have railings?: Yes  Type of Current Residence: Bi-level  Upon entering residence, is there a bedroom on the main floor (no further steps)?: No  A bedroom is located on the following floor levels of residence (select all that apply):: 2nd Floor  Upon entering residence, is there a bathroom on the main floor (no further steps)?: No  Indicate which floors of current residence have a bathroom (select all the apply):: 2nd Floor  Number of steps to 2nd floor from main floor: 4  Living Arrangements: Lives w/ Spouse/significant other  Is patient a ?: No    Activities of Daily Living Prior to Admission  Functional Status: Assistance  Completes ADLs independently?: No  Level of ADL dependence: Assistance  Ambulates independently?: Yes  Does patient use assisted devices?: Yes  Assisted Devices (DME) used: Walker, Straight Cane  Does patient currently own DME?: Yes  What DME does the patient currently own?: Walker, Straight Cane  Does patient have a history of Outpatient Therapy (PT/OT)?: Yes  Does the patient have a history of Short-Term Rehab?: No  Does patient have a history of HHC?: No  Does patient currently have HHC?: No    Patient Information Continued  Income Source: SSI/SSD  Does patient have prescription coverage?: Yes  Does patient receive dialysis treatments?: Yes ([facility] [place]; [place]/W/F)  Does patient have a history of substance abuse?: No    Means of Transportation  [facility] Family transport    Social Determinants of Health (SDOH)      Flowsheet Row Most Recent Value   Housing Stability    In the last 12 months, was there a time when you were not able to pay the mortgage or rent on time? N   In the past 12 months, how many times have you moved where you were living? 0   At any time in the past 12 months, were you homeless or living in a shelter (including now)? N    Transportation Needs    In the past 12 months, has lack of transportation kept you from medical appointments or from getting medications? no   In the past 12 months, has lack of transportation kept you from meetings, work, or from getting things needed for daily living? No   Food Insecurity    Within the past 12 months, you worried that your food would run out before you got the money to buy more. Never true   Within the past 12 months, the food you bought just didn't last and you didn't have money to get more. Never true   Utilities    In the past 12 months has the electric, gas, oil, or water company threatened to shut off services in your home? No     DISCHARGE DETAILS:    Discharge planning discussed with:: patient and wife, Eliana- at bedside  Freedom of Choice: Yes  Comments - Freedom of Choice: OP therapy, DME  CM contacted family/caregiver?: Yes  Were Treatment Team discharge recommendations reviewed with patient/caregiver?: Yes  Did patient/caregiver verbalize understanding of patient care needs?: Yes  Were patient/caregiver advised of the risks associated with not following Treatment Team discharge recommendations?: Yes    Contacts  Patient Contacts: Eliana  Relationship to Patient:: Family (wife)  Contact Method: In Person  Reason/Outcome: Discharge Planning, Continuity of Care, Emergency Contact, Referral    Requested Home Health Care         Is the patient interested in HHC at discharge?: No    DME Referral Provided  Referral made for DME?: Yes  DME referral completed for the following items:: Wheelchair  DME Supplier Name:: [facility]    Other Referral/Resources/Interventions Provided:  Interventions: DME, Prescription Price Check  Referral Comments: Patient admitted to [facility] due to DVT. CM met with patient and wifeEliana at bedside to complete assessment/ discuss dcp. Patient lives with wife, in a bilevel home, 2STE and 4 steps to bed and bath. Patient requires assistance with ADLs, uses and  owns a walker and cane. Wife did mention, she would like a wheelchair for patient-- stating she would appreciate if CM can order one to see if insurance will cover. Order placed via [employer]. Patient is established at [facility] in [place] M/W/F for HD-- family drives. CM discussed therapy recommendation of level III (HHC vs OP)-- wife stating patient is already established with OP therapy, and they would like to continue. Wife aware CM will follow up regarding DME.    CM consult received to price check eliquis and xarelto. Call made to [facility]- prices as follows:   Eliquis: $150.00  Xarelto: $150.00  [facility] notified of above.     Addendum: CM notified by SLIM that price was discussed for Eliquis and family feels comfortable paying. SLIM aware patient is eligible for a 30 day coupon, but after that will need to pay above price.     CM met with patient and wife again at bedside to provide coupon. Discussed that coupon is only valid for one month and then price will be $150.00 after-- wife expressed understanding.    Would you like to participate in our [facility] service program?  : No - Declined    Treatment Team Recommendation: Other (Level III)  Discharge Destination Plan:: Home  Transport at Discharge : Family

## 2024-07-15 NOTE — PROGRESS NOTES
[facility]  Progress Note  Name: Asad Galloway I  MRN: [id number]  Unit/Bed#: S -01 I Date of Admission: 7/14/2024   Date of Service: 7/15/2024 I Hospital Day: 1    Assessment & Plan   * Acute deep vein thrombosis (DVT) of right peroneal vein (HCC)  Assessment & Plan  Patient had right lower extremity for the past week, post leg injury for which he reported to ED.  Patient walks on own without cane, uses cane as needed.   07/14/2024 Venous duplex of lower limb showed evidence of acute vs sub acute, occlusive deep vein thrombosis within (one) of the paired peroneal veins at the proximal calf  No history of malignancy, prolonged travel, bedbound status, family/personal history of DVT  Patient started on heparin drip in ED  Covid/flu/RSV negative  Edema improved to near baseline.    Plan:  Continue heparin drip for now  Plan to switch to oral anticoagulation pending price check of Eliquis and Xarelto  Plan for heme/onc consult outpatient, can provide referral if needed    Acute cough  Assessment & Plan  Patient presented to ED with 2 day history nonproductive cough associated with rhinorrhea, sore throat. vital signs stable, did not meet sepsis criteria, Family sick at home with sinusitis.   CXR shows increased bilateral infiltrates versus pulmonary congestion, increased right lower lobe consolidation.  Official chest x-ray read stated questionable mild pulmonary congestion.  Negative covid/flu/RSV  Procal down trending from 0.33 to 0.31  Patient overall does not look infectious, rhinorrhea improved, non productive cough present.   Likely viral in origin    Plan:  Continue Mucinex 1200 mg twice daily  Routine vital signs    ESRD on dialysis (HCC)  Assessment & Plan  Lab Results   Component Value Date    EGFR 5 07/15/2024    EGFR 6 07/14/2024    EGFR 10 07/06/2024    CREATININE 8.75 (H) 07/15/2024    CREATININE 8.33 (H) 07/14/2024    CREATININE 5.52 (H) 07/06/2024     ESRD secondary to  diabetes, on HD M/W/F for 4 years, currently on Kidney transplant list  Baseline creatinine between 4-5  Creatinine today is 8.75  Serum sodium is 133  Serum potassium is 6.0    Plan:  Nephrology has seen patient, appreciate recommendations  Given Lokelma 10 g x 1 for hyperkalemia  Plan for hemodialysis today  Continue Sevelamer 2400mg TID  Follow-up BMP in the morning    CHF (congestive heart failure) ([facility])  Assessment & Plan  Wt Readings from Last 3 Encounters:   07/15/24 98.5 kg (217 lb 2.5 oz)   07/05/24 98.4 kg (216 lb 14.9 oz)   07/02/24 94.8 kg (209 lb)       LVEF with echo in February 2023 at [facility] showing EF of 35 to 40%.  Patient denied any shortness of breath, leg swelling or increased weight.  Following cardiologist outpatient and last visit in May.    Plan:  Continue home meds metoprolol, Entresto.  Strict I/O  Daily weights      Anemia  Assessment & Plan  Lab Results   Component Value Date    HGB 9.3 (L) 07/15/2024    HGB 9.8 (L) 07/14/2024    HGB 9.6 (L) 07/06/2024    HGB 10.6 (L) 07/05/2024        Baseline hemoglobin between 10-11  Chronic anemia in the setting of ESRD  Denied any hematemesis, melena, bleeding    Plan  Monitor symptoms and CBC    Mixed hyperlipidemia  Assessment & Plan  Continue Lipitor 40mg qhs    Type 2 diabetes mellitus with chronic kidney disease on chronic dialysis, without long-term current use of insulin ([facility])  Assessment & Plan  Lab Results   Component Value Date    HGBA1C 5.9 (H) 07/14/2024       Recent Labs     07/15/24  0202 07/15/24  0731 07/15/24  1208   POCGLU 110 98 113       Blood Sugar Average: Last 72 hrs:  (P) 107No home medication regimen, controlled with diet  HbgA1c  Regular diet  PCOT glucose checks before meals and bedtime  If blood sugars persistently >200, order sliding scale insulin  Goal -180 while admitted, adjusting insulin regimen as appropriate  Monitor for hypoglycemia and treat per protocol             VTE Pharmacologic Prophylaxis: VTE Score: 9  High Risk (Score >/= 5) - Pharmacological DVT Prophylaxis Ordered: heparin. Sequential Compression Devices Ordered.    Mobility:   Basic Mobility Inpatient Raw Score: 14  JH-HLM Goal: 4: Move to chair/commode  JH-HLM Achieved: 6: Walk 10 steps or more  JH-HLM Goal achieved. Continue to encourage appropriate mobility.    Patient Centered Rounds: I performed bedside rounds with nursing staff today.   Discussions with Specialists or Other Care Team Provider: Yes    Education and Discussions with Family / Patient: Updated  (wife and son) at bedside.    Total Time Spent on Date of Encounter in care of patient: 45 mins. This time was spent on one or more of the following: performing physical exam; counseling and coordination of care; obtaining or reviewing history; documenting in the medical record; reviewing/ordering tests, medications or procedures; communicating with other healthcare professionals and discussing with patient's family/caregivers.    Current Length of Stay: 1 day(s)  Current Patient Status: Inpatient   Certification Statement: The patient will continue to require additional inpatient hospital stay due to acute DVT and comorbidities management.   Discharge Plan: Anticipate discharge in 24-48 hrs to home.    Code Status: Level 1 - Full Code    Subjective:   64 y.o. male with PMHx of T2DM. ERSD on HD, history of CVA of left MCA without deficits, CHF (35-40%) who presented with non productive cough, sneezing, rhinorrhea and sore throat. Patient admitted for right peroneal DVT finding.     Saw patient with family at bedside.  Family reports that he has been less mobile compared to baseline due to post angiogram of left wound.  Also reports history of fall on right foot a few days ago, stated his ankle has been swollen since then. Patient swelling has decreased near baseline. Rhinorrhea and cough is present but improved. Denies chest pain, nausea, vomiting, sore throat.    Objective:     Vitals:    Temp (24hrs), Av.4 °F (36.9 °C), Min:97.6 °F (36.4 °C), Max:98.6 °F (37 °C)    Temp:  [97.6 °F (36.4 °C)-98.6 °F (37 °C)] 98.5 °F (36.9 °C)  HR:  [72-88] 87  Resp:  [18-20] 18  BP: ()/(35-75) 114/50  SpO2:  [92 %-98 %] 94 %  Body mass index is 33.02 kg/m².     Input and Output Summary (last 24 hours):     Intake/Output Summary (Last 24 hours) at 7/15/2024 1510  Last data filed at 7/15/2024 1300  Gross per 24 hour   Intake 400 ml   Output --   Net 400 ml       Physical Exam:   Physical Exam  Constitutional:       Appearance: Normal appearance.   HENT:      Head: Normocephalic and atraumatic.      Right Ear: External ear normal.      Left Ear: External ear normal.      Mouth/Throat:      Mouth: Mucous membranes are moist.      Pharynx: Oropharynx is clear.   Eyes:      Conjunctiva/sclera: Conjunctivae normal.   Cardiovascular:      Rate and Rhythm: Normal rate and regular rhythm.      Pulses: Normal pulses.      Heart sounds: Normal heart sounds.   Pulmonary:      Effort: Pulmonary effort is normal. No respiratory distress.      Breath sounds: Normal breath sounds. No wheezing.   Abdominal:      General: There is no distension.      Palpations: Abdomen is soft.      Tenderness: There is no abdominal tenderness.   Musculoskeletal:      Cervical back: Normal range of motion.      Right lower leg: Edema present.   Skin:     General: Skin is warm and dry.   Neurological:      General: No focal deficit present.      Mental Status: He is oriented to person, place, and time.   Psychiatric:         Mood and Affect: Mood normal.         Behavior: Behavior normal.          Additional Data:     Labs:  Results from last 7 days   Lab Units 07/15/24  0450   WBC Thousand/uL 7.39   HEMOGLOBIN g/dL 9.3*   HEMATOCRIT % 29.0*   PLATELETS Thousands/uL 136*   SEGS PCT % 78*   LYMPHO PCT % 11*   MONO PCT % 8   EOS PCT % 2     Results from last 7 days   Lab Units 07/15/24  0623   SODIUM mmol/L 133*   POTASSIUM mmol/L 6.0*    CHLORIDE mmol/L 96   CO2 mmol/L 23   BUN mg/dL 70*   CREATININE mg/dL 8.75*   ANION GAP mmol/L 14*   CALCIUM mg/dL 8.3*   ALBUMIN g/dL 3.6   TOTAL BILIRUBIN mg/dL 0.62   ALK PHOS U/L 99   ALT U/L 6*   AST U/L 6*   GLUCOSE RANDOM mg/dL 101     Results from last 7 days   Lab Units 07/14/24  1901   INR  1.01     Results from last 7 days   Lab Units 07/15/24  1208 07/15/24  0731 07/15/24  0202   POC GLUCOSE mg/dl 113 98 110     Results from last 7 days   Lab Units 07/14/24  1901   HEMOGLOBIN A1C % 5.9*     Results from last 7 days   Lab Units 07/15/24  0450 07/14/24  1901   PROCALCITONIN ng/ml 0.31* 0.33*       Lines/Drains:  Invasive Devices       Peripheral Intravenous Line  Duration             Peripheral IV 07/14/24 Distal;Right;Ventral (anterior) Forearm <1 day    Peripheral IV 07/14/24 Right Antecubital <1 day              Line  Duration             Hemodialysis Access Left Upper arm -- days                          Imaging: Reviewed radiology reports from this admission including: chest xray    Recent Cultures (last 7 days):         Last 24 Hours Medication List:   Current Facility-Administered Medications   Medication Dose Route Frequency Provider Last Rate    acetaminophen  650 mg Oral Q6H PRN Jazz Lori Brouse, DO      aspirin  81 mg Oral Daily Eren Yousif MD      atorvastatin  40 mg Oral Daily With Dinner Jazz Sandoval Brouse, DO      Cholecalciferol  1,000 Units Oral Daily Jazz Lori Brouse, DO      divalproex sodium  250 mg Oral Q12H MICH Jazz Lori Brouse, DO      guaiFENesin  400 mg Oral Q4H PRN Jazz Lori Brouse, DO      heparin (porcine)  3-30 Units/kg/hr (Order-Specific) Intravenous Titrated Jazz Lori Brouse, DO 22 Units/kg/hr (07/15/24 1037)    metoprolol succinate  50 mg Oral BID Jazz Lori Brouse, DO      ondansetron  4 mg Intravenous Q6H PRN Jazz Lori Brouse, DO      prasugrel  5 mg Oral Daily Jazz Lori Brouse, DO       sacubitril-valsartan  1 tablet Oral Daily Jazz Ovalle DO      sevelamer  2,400 mg Oral TID With Meals Jazz Ovalle DO          Today, Patient Was Seen By: Christine Silva MD    **Please Note: This note may have been constructed using a voice recognition system.**

## 2024-07-15 NOTE — CONSULTS
Consultation - Nephrology   Asad JACOB Galloway 64 y.o. male MRN: [id number]  Unit/Bed#: S -01 Encounter: [id number]    ASSESSMENT/PLAN:  ESRD on maintenance HD MWF@[facility]:  -last treatment 7/12/2024  -EDW: 94.6 kg  -Plan for HD today per regular schedule  -next treatment 7/17/2024.  Will plan to continue regular Monday, Wednesday, Friday schedule during hospitalization.  -d/w Dr. Reis    Access: LUE AVF  -+thrill/bruit   -site clear  -continue to use AVF for hemodialysis access    Hyperkalemia:  -K+ 6.0  -Give Lokelma 10 g x 1 now  -Plan for HD today  -Will otherwise manage with HD  -Low potassium diet  -Avoid ACE, ARB, spironolactone  -Continue to monitor    HTN/volume status:  -BP soft  -volume status mild hypervolemia  -maintain goal BP <140/90  -home medications: Toprol-XL 50 mg twice daily, Entresto 24-26 mg daily  -current medications: Toprol-XL 50 mg twice daily, Entresto 24-26 mg daily  -continue UF with dialysis as BP tolerates  -continue to monitor    Hyponatremia:  -In the setting of ESRD and inability to excrete free water  -Mild, serum sodium 133  -Fluid restriction  -Will manage with HD  Continue to monitor-    Anemia in CKD:  -Hbg 9.3, below goal.  Goal Hgb >10  -Not on BAYLEE due to hx CVA and now acute DVT  -continue to monitor  -transfuse for Hgb <7.0  -Management per primary team    Bone mineral disease of renal origin:  -Hyperphosphatemia: Phosphorus 5.9, not at goal.  Continue sevelamer 2400 mg 3 times daily with meals.  Low phosphorus diet  -Secondary hyperparathyroidism: Continue calcitriol 0.5 mcg 3 times a week with HD, Sensipar 120 mg 3 times a week with HD  -Calcium and magnesium stable  -Renal diet  -continue to monitor phosphorus, PTH, calcium, magnesium as outpatient    Acute right peroneal DVT:  -Recent fall with R ankle injury and immobility  -Continue anticoagulation.  On heparin drip, transition to NOAC prior to discharge  -Compression and elevation of RLE for symptomatic edema  relief  -Management per primary team    DM II:  -stable  -HgbA1C 5.1  -continue to optimize glycemic control  -management per primary team    PAD with L heel tissue loss:  -s/p L popliteal POBA/DCB PTA Feb '24  -Continue ASA and statin.  On Effient  -Continue local wound care  -Outpatient management surveillance per vascular surgery      HISTORY OF PRESENT ILLNESS:  Requesting Physician: Rizwana Steward MD  Reason for Consult: ESRD on HD    Asad Galloway is a 64 y.o. year old male with ESRD on HD MWF via LUE AVF at [facility], CAD, s/p  PCI/stent, HTN, HLD, DM2, hx L MCA CVA, PAD with left heel tissue loss, s/p POBA/DCB PTA left popliteal 2/'24 who was admitted to [facility] after presenting with cough, sneezing and sore throat and incidentally found to have acute R peroneal DVT.  Heparin drip initiated.  CXR with mild pulmonary venous congestion.  Patient reports falling last week injuring his right ankle and has been less active.  A renal consultation is requested today for assistance in the management of ESRD. Asad Galloway is a known ESRD patient who undergoes maintenance hemodialysis at [facility] on Monday, Wednesday, Friday.  Last HD 7/12/2024.    PAST MEDICAL HISTORY:  Past Medical History:   Diagnosis Date    Cerebrovascular accident (CVA) due to thrombosis of left middle cerebral artery (HCC) 07/29/2018    Chronic kidney disease     Coronary artery disease     Diabetes mellitus (HCC)     not on meds    Dialysis patient (HCC)     M-W-F    Fistula     left upper arm for hemodialyis    GERD (gastroesophageal reflux disease)     History of coronary artery stent placement     x3    Hypercholesteremia     Hyperlipidemia     Hypertension     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    Limb alert care status     no BP/IV left arm    Neuropathy     Obesity     Osteomyelitis (HCC)     last assessed 11/4/16-per son not currently    PVC's (premature ventricular contractions)     sees  Cardio    Stroke ([facility])      "last weeof July 2018 [facility]    TIA (transient ischemic attack) 10/28/2018    Wears dentures     Wears glasses        PAST SURGICAL HISTORY:  Past Surgical History:   Procedure Laterality Date    ABDOMINAL SURGERY      CARDIAC CATHETERIZATION N/A 05/02/2022    Procedure: Cardiac Coronary Angiogram;  Surgeon: Sam Davis MD;  Location: [facility] CARD[facility]AC [facility]ATH [facility];  Service: Cardiology    CARDIAC CATHETERIZATION N/A 05/02/2022    Procedure: Cardiac pci;  Surgeon: Sam Davis MD;  Location: [facility] CARDIAC [facility]ATH [facility];  Service: Cardiology    CARDIAC CATHETERIZATION  02/01/2023    Procedure: Cardiac catheterization;  Surgeon: Sam Davis MD;  Location: [facility] CARDIAC CATH [facility];  Service: Cardiology    CARDIAC CATHETERIZATION N/A 02/01/2023    Procedure: Cardiac pci;  Surgeon: Sam Davis MD;  Location: [facility] CARDIAC [facility];  Service: Cardiology    CARDIAC CATHETERIZATION N/A 02/01/2023    Procedure: Cardiac Coronary Angiogram;  Surgeon: Sam Davis MD;  Location: [facility] CARDIAC CATH [facility];  Service: Cardiology    CARDIAC CATHETERIZATION N/A 02/01/2023    Procedure: Cardiac other-IVUS;  Surgeon: Sam Davis MD;  Location: [facility] CARDIAC CATH [facility];  Service: Cardiology    CHOLECYSTECTOMY      Percutaneous    COLONOSCOPY      CYSTOSCOPY      HEMODIALYSIS ADULT  1/22/2024    HEMODIALYSIS ADULT  1/24/2024    IR LOWER EXTREMITY ANGIOGRAM  9/29/2023    IR LOWER EXTREMITY ANGIOGRAM  2/26/2024    OTHER SURGICAL HISTORY      \"stimulator to control bowel movements\"    AR ESOPHAGOGASTRODUODENOSCOPY TRANSORAL DIAGNOSTIC N/A 09/27/2016    Procedure: ESOPHAGOGASTRODUODENOSCOPY (EGD);  Surgeon: Adele Rowe MD;  Location: [facility] GI LAB;  Service: Gastroenterology    AR LAPAROSCOPY SURG CHOLECYSTECTOMY N/A 02/29/2016    Procedure: LAPAROSCOPIC CHOLECYSTECTOMY ;  Surgeon: Cliff Roman DO;  Location: [facility] Main OR;  Service: General    AR SLCTV CATHJ 3RD+ ORD SLCTV ABDL PEL/LXTR BRNCH Left 9/29/2023    Procedure: Left leg angiogram with " "intervention;  Surgeon: Michelle Galvan MD;  Location: [facility] MAIN OR;  Service: Vascular    NE SLCTV CATHJ 3RD+ ORD SLCTV ABDL PEL/LXTR BRNCH Left 2/26/2024    Procedure: Left leg angiogram antegrade access, left popliteal angioplasty;  Surgeon: Michelle Galvan MD;  Location: [facility] MAIN OR;  Service: Vascular    ROTATOR CUFF REPAIR Right     SPINAL CORD STIMULATOR IMPLANT      \"Medtronic interstim model # 3058- in lower back to control bowel movements-currently turned off-battery is dead\"    TOE AMPUTATION Right 10/28/2016    Procedure: 3RD TOE AMPUTATION ;  Surgeon: Anjel Salas DPM;  Location: [facility] Main OR;  Service:        ALLERGIES:  No Known Allergies    SOCIAL HISTORY:  Social History     Substance and Sexual Activity   Alcohol Use Never     Social History     Substance and Sexual Activity   Drug Use No     Social History     Tobacco Use   Smoking Status Never   Smokeless Tobacco Never       FAMILY HISTORY:  Family History   Problem Relation Age of Onset    Leukemia Mother     Liver disease Mother     Lung cancer Mother         heavy smoker - 3 ppd    Heart disease Father     Liver disease Father     Diabetes type I Father     Multiple myeloma Sister     Breast cancer Sister     Urolithiasis Family     Alcohol abuse Neg Hx     Depression Neg Hx     Drug abuse Neg Hx     Substance Abuse Neg Hx     Mental illness Neg Hx        MEDICATIONS:    Current Facility-Administered Medications:     acetaminophen (TYLENOL) tablet 650 mg, 650 mg, Oral, Q6H PRN, Jazz Ovalle DO    aspirin (ECOTRIN LOW STRENGTH) EC tablet 81 mg, 81 mg, Oral, Daily, Jazz Ovalle DO    atorvastatin (LIPITOR) tablet 40 mg, 40 mg, Oral, Daily With Dinner, Jazz Ovalle DO    benzonatate (TESSALON PERLES) capsule 200 mg, 200 mg, Oral, TID, Jazz Ovalle DO, 200 mg at 07/14/24 2143    Cholecalciferol (VITAMIN D3) tablet 1,000 Units, 1,000 Units, Oral, Daily, Jazz Ovalle DO    " divalproex sodium (DEPAKOTE SPRINKLE) capsule 250 mg, 250 mg, Oral, Q12H MICH, Jazz Ovalle, DO, 250 mg at 07/14/24 2248    guaiFENesin (ROBITUSSIN) oral liquid 400 mg, 400 mg, Oral, Q4H PRN, Jazz Dominguezuse, DO, 400 mg at 07/15/24 0224    heparin (porcine) 25,000 units in 0.45% NaCl 250 mL infusion (premix), 3-30 Units/kg/hr (Order-Specific), Intravenous, Titrated, Jazz Ovalle, DO, Last Rate: 19 mL/hr at 07/15/24 0212, 20 Units/kg/hr at 07/15/24 0212    metoprolol succinate (TOPROL-XL) 24 hr tablet 50 mg, 50 mg, Oral, BID, Jazz Dominguezuse, DO    ondansetron (ZOFRAN) injection 4 mg, 4 mg, Intravenous, Q6H PRN, Jazz Ovalle, DO    prasugrel (EFFIENT) tablet 5 mg, 5 mg, Oral, Daily, Jazz Dominguezuse, DO    sacubitril-valsartan (ENTRESTO) 24-26 MG per tablet 1 tablet, 1 tablet, Oral, Daily, Jazz Dominguezuse, DO    sevelamer (RENAGEL) oral suspension 2,400 mg, 2,400 mg, Oral, TID With Meals, Jazz Ovalle, DO    REVIEW OF SYSTEMS:  A complete 10 point review of systems was performed and found to be negative unless otherwise noted below or in the HPI.  General: No fevers, chills.   Cardiovascular: No chest pain, shortness of breath, palpitations,+ right leg edema.  Respiratory: + cough, +sputum production, no shortness of breath.  Gastrointestinal: No nausea, vomiting, abdominal pain, diarrhea.  Genitourinary: No hematuria.    PHYSICAL EXAM:  Current Weight: Weight - Scale: 98.5 kg (217 lb 2.5 oz) (pt too weak for standing scale)  First Weight: Weight - Scale: 99.8 kg (220 lb 0.3 oz)  Vitals:    07/14/24 2131 07/14/24 2255 07/15/24 0532 07/15/24 0729   BP: 101/62 116/64  99/69   BP Location:       Pulse: 82 88  81   Resp:       Temp:  98.4 °F (36.9 °C)  98.5 °F (36.9 °C)   TempSrc:       SpO2: 94% 95%  94%   Weight:   98.5 kg (217 lb 2.5 oz)    Height:         No intake or output data in the 24 hours ending 07/15/24 0748  General:  Awake, alert,  appears comfortable and in no acute distress.  Nontoxic.  Skin:  No rash, warm, good skin turgor   Eyes:  PERRL, EOMI, sclerae nonicteric.  no periorbital edema   ENT:  Moist mucous membranes  Neck:  Trachea midline, symmetric.  No JVD.  No carotid bruits.  Chest:  Clear to auscultation bilaterally without wheezes, crackles or rhonchi  CVS:  Regular rate and rhythm without murmur, gallop or rub.  S1 and S2 identified and normal.  No S3, S4.   Abdomen:  Soft, nontender, nondistended without masses.  Normal bowel sounds x 4 quadrants.  No bruit.  Extremities:  Warm, pink, motor and sensory intact and well perfused.  No cyanosis, pallor.  Trace RLE edema.  Neuro:  Awake, alert, oriented x3.  Grossly intact  Psych:  Appropriate affect.  Mentating appropriately.  Normal mental status exam  Access: + Thrill, bruit LUE AVF, aneurysmal aVF       Invasive Devices: None     Lab Results:   Results from last 7 days   Lab Units 07/15/24  0623 07/15/24  0450 07/14/24  1901   WBC Thousand/uL  --  7.39 6.20   HEMOGLOBIN g/dL  --  9.3* 9.8*   HEMATOCRIT %  --  29.0* 31.6*   PLATELETS Thousands/uL  --  136* 144*   SODIUM mmol/L 133*  --  135   POTASSIUM mmol/L 6.0*  --  4.9   CHLORIDE mmol/L 96  --  94*   CO2 mmol/L 23  --  28   BUN mg/dL 70*  --  65*   CREATININE mg/dL 8.75*  --  8.33*   CALCIUM mg/dL 8.3*  --  8.9   MAGNESIUM mg/dL 2.1  --   --    PHOSPHORUS mg/dL 5.9*  --   --    ALK PHOS U/L 99  --  115*   ALT U/L 6*  --  8   AST U/L 6*  --  5*     Lab Results   Component Value Date    CALCIUM 8.3 (L) 07/15/2024    PHOS 5.9 (H) 07/15/2024   Imaging study:  CXR 7/14/2024:  Imaging studies and images personally reviewed.  Mild pulmonary venous congestion without pneumothorax or pleural effusion.    LEVD 7/14/2024:  Imaging study personally reviewed.  Acute versus subacute occlusive DVT of one of the paired right peroneal veins.  Arterial waveform is triphasic.    EMR, including Care Everywhere, [employer],  [employer] [facility] tosin and  outpatient notes reviewed.  I have personally reviewed the blood work as stated above and in my note.  I have personally reviewed CXR, LEVD imaging studies.  I have personally reviewed primary medical service and previous nephrology note.

## 2024-07-15 NOTE — ASSESSMENT & PLAN NOTE
Lab Results   Component Value Date    HGB 9.3 (L) 07/15/2024    HGB 9.8 (L) 07/14/2024    HGB 9.6 (L) 07/06/2024    HGB 10.6 (L) 07/05/2024        Baseline hemoglobin between 10-11  Chronic anemia in the setting of ESRD  Denied any hematemesis, melena, bleeding    Plan  Monitor symptoms and CBC

## 2024-07-16 ENCOUNTER — RA CDI HCC (OUTPATIENT)
Dept: OTHER | Facility: HOSPITAL | Age: 64
End: 2024-07-16

## 2024-07-16 VITALS
DIASTOLIC BLOOD PRESSURE: 76 MMHG | OXYGEN SATURATION: 99 % | SYSTOLIC BLOOD PRESSURE: 94 MMHG | BODY MASS INDEX: 31.17 KG/M2 | HEIGHT: 68 IN | TEMPERATURE: 97.9 F | RESPIRATION RATE: 15 BRPM | WEIGHT: 205.69 LBS | HEART RATE: 84 BPM

## 2024-07-16 LAB
ANION GAP SERPL CALCULATED.3IONS-SCNC: 8 MMOL/L (ref 4–13)
APTT PPP: 99 SECONDS (ref 23–37)
BUN SERPL-MCNC: 31 MG/DL (ref 5–25)
CALCIUM SERPL-MCNC: 8.6 MG/DL (ref 8.4–10.2)
CHLORIDE SERPL-SCNC: 96 MMOL/L (ref 96–108)
CO2 SERPL-SCNC: 30 MMOL/L (ref 21–32)
CREAT SERPL-MCNC: 5.49 MG/DL (ref 0.6–1.3)
GFR SERPL CREATININE-BSD FRML MDRD: 10 ML/MIN/1.73SQ M
GLUCOSE SERPL-MCNC: 104 MG/DL (ref 65–140)
GLUCOSE SERPL-MCNC: 110 MG/DL (ref 65–140)
GLUCOSE SERPL-MCNC: 118 MG/DL (ref 65–140)
MRSA NOSE QL CULT: NORMAL
POTASSIUM SERPL-SCNC: 5 MMOL/L (ref 3.5–5.3)
SODIUM SERPL-SCNC: 134 MMOL/L (ref 135–147)

## 2024-07-16 PROCEDURE — 97166 OT EVAL MOD COMPLEX 45 MIN: CPT

## 2024-07-16 PROCEDURE — 85730 THROMBOPLASTIN TIME PARTIAL: CPT | Performed by: INTERNAL MEDICINE

## 2024-07-16 PROCEDURE — 80048 BASIC METABOLIC PNL TOTAL CA: CPT | Performed by: PHYSICIAN ASSISTANT

## 2024-07-16 PROCEDURE — 99239 HOSP IP/OBS DSCHRG MGMT >30: CPT | Performed by: INTERNAL MEDICINE

## 2024-07-16 PROCEDURE — 82948 REAGENT STRIP/BLOOD GLUCOSE: CPT

## 2024-07-16 PROCEDURE — 99232 SBSQ HOSP IP/OBS MODERATE 35: CPT | Performed by: INTERNAL MEDICINE

## 2024-07-16 RX ORDER — CALCITRIOL 0.5 UG/1
0.5 CAPSULE, LIQUID FILLED ORAL 3 TIMES WEEKLY
Qty: 12 CAPSULE | Refills: 0 | Status: SHIPPED | OUTPATIENT
Start: 2024-07-17

## 2024-07-16 RX ORDER — CINACALCET 60 MG/1
120 TABLET, FILM COATED ORAL 3 TIMES WEEKLY
Qty: 24 TABLET | Refills: 0 | Status: SHIPPED | OUTPATIENT
Start: 2024-07-17

## 2024-07-16 RX ORDER — METOPROLOL SUCCINATE 50 MG/1
50 TABLET, EXTENDED RELEASE ORAL 2 TIMES DAILY
Qty: 60 TABLET | Refills: 0 | Status: SHIPPED | OUTPATIENT
Start: 2024-07-16

## 2024-07-16 RX ORDER — SACUBITRIL AND VALSARTAN 24; 26 MG/1; MG/1
1 TABLET, FILM COATED ORAL DAILY
Qty: 30 TABLET | Refills: 0 | Status: SHIPPED | OUTPATIENT
Start: 2024-07-16

## 2024-07-16 RX ADMIN — SEVELAMER HYDROCHLORIDE 2400 MG: 800 TABLET ORAL at 10:06

## 2024-07-16 RX ADMIN — Medication 1000 UNITS: at 10:06

## 2024-07-16 RX ADMIN — METOPROLOL SUCCINATE 50 MG: 50 TABLET, EXTENDED RELEASE ORAL at 10:07

## 2024-07-16 RX ADMIN — DIVALPROEX SODIUM 250 MG: 125 CAPSULE ORAL at 10:06

## 2024-07-16 RX ADMIN — ASPIRIN 81 MG 81 MG: 81 TABLET ORAL at 10:07

## 2024-07-16 RX ADMIN — PRASUGREL 5 MG: 10 TABLET, FILM COATED ORAL at 10:06

## 2024-07-16 RX ADMIN — APIXABAN 5 MG: 5 TABLET, FILM COATED ORAL at 10:06

## 2024-07-16 RX ADMIN — HEPARIN SODIUM 22 UNITS/KG/HR: 10000 INJECTION, SOLUTION INTRAVENOUS at 00:36

## 2024-07-16 RX ADMIN — SEVELAMER HYDROCHLORIDE 2400 MG: 800 TABLET ORAL at 13:22

## 2024-07-16 NOTE — ASSESSMENT & PLAN NOTE
Patient presented to ED with 2 day history nonproductive cough associated with rhinorrhea, sore throat   Negative covid/flu/RSV  Patient cough improved, only slight cough present  Rhinorrhea improved, no SOB, denies n/v and chills   Likely viral in origin    Plan:  Patient switched from Mucinex to Robitussin   Continue supportive care at home

## 2024-07-16 NOTE — CASE MANAGEMENT
Case Management Discharge Planning Note    Patient name Asad Galloway  Location S /S -01 MRN [id number]  : 1960 Date 2024       Current Admission Date: 2024  Current Admission Diagnosis:Acute deep vein thrombosis (DVT) of right peroneal vein ([facility])   Patient Active Problem List    Diagnosis Date Noted Date Diagnosed    Acute deep vein thrombosis (DVT) of right peroneal vein ([facility]) 2024     Acute cough 2024     Right ankle pain 2024     CHF (congestive heart failure) ([facility]) 2024     Peripheral arterial disease ([facility]) 2024     Type 2 diabetes mellitus with diabetic neuropathy, unspecified whether long term insulin use ([facility]) 2024     Ulcer of left heel, limited to breakdown of skin ([facility]) 2024     Hypertensive heart and chronic kidney disease with heart failure and with stage 5 chronic kidney disease, or end stage renal disease ([facility]) 2024     Falls 2024     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 ([facility]) 2024     Chronic systolic heart failure ([facility]) 2024     Hypotension 2023     Edema of left lower extremity 2023     Rectal bleeding 2023     Nonhealing ulcer of heel ([facility]) 2023     Elevated troponin 2023     Presence of external cardiac defibrillator 2023     Diabetic polyneuropathy associated with type 2 diabetes mellitus ([facility]) 2023     Absence of toe of right foot ([facility]) 2023     Hammer toes of both feet 2023     Ischemic cardiomyopathy 2023     Moderate AS (aortic stenosis) 2023     Mood disorder ([facility]) 2023     Old myocardial infarction 2023     SOB (shortness of breath) 10/21/2022     Left arm swelling 10/21/2022     CAD, multiple vessel 2022     Electrolyte abnormality 2022     Chest pain 2022     Generalized weakness 2022     Primary hypertension 2022     Penetrating foot wound,  left, initial encounter 01/19/2022     Emotional lability 01/19/2022     History of 2019 novel coronavirus disease (COVID-19) 12/26/2020     ESRD on dialysis (HCC) 12/26/2020     Anemia 10/05/2020     S/P arteriovenous (AV) fistula creation 04/27/2020     Pre-kidney transplant, listed 04/27/2020     Urge incontinence 12/20/2019     Anxiety associated with depression 02/28/2019     History of stroke 10/04/2018     Abnormal EEG 09/24/2018     Other constipation 08/17/2018     GERD (gastroesophageal reflux disease) 08/13/2018     Elevated alkaline phosphatase level 08/03/2018     Nephrotic range proteinuria 03/28/2018     Type 2 diabetes mellitus with chronic kidney disease on chronic dialysis, without long-term current use of insulin (HCC) 02/26/2016     Dizziness 02/26/2016     Mixed hyperlipidemia 02/26/2016     Antiplatelet or antithrombotic long-term use 02/26/2016       LOS (days): 2  Geometric Mean LOS (GMLOS) (days): 4  Days to GMLOS:2.3     OBJECTIVE:  Risk of Unplanned Readmission Score: 26.01         Current admission status: Inpatient   Preferred Pharmacy:   [facility]/pharmacy #[id number] - [place] - [address]  [place] [address]  Phone: [phone] Fax: [phone]    Primary Care Provider: Raj Auguste DO    Primary Insurance: MEDICARE  Secondary Insurance: [facility] BLUE [facility]    DISCHARGE DETAILS:    IMM Given (Date)::  (initial copy given within 48hrs of d/c)    Additional Comments: CM notified by [employer] that they spoke with son, who states they would like to hold off in getting a wheelchair, at this time. Call made to wife, Eliana who confirms same. Patient to d/c home today, per SLIM. No further CM needs.

## 2024-07-16 NOTE — PROGRESS NOTES
[facility]  Progress Note  Name: Asad Galloway I  MRN: [id number]  Unit/Bed#: S -01 I Date of Admission: 7/14/2024   Date of Service: 7/16/2024 I Hospital Day: 2    Assessment & Plan   * Acute deep vein thrombosis (DVT) of right peroneal vein (HCC)  Assessment & Plan  Patient had right lower extremity for the past week, post leg injury, found to have a DVT on duplex ultrasound on 07/14  Patient started on heparin drip in ED, transitioned to Eliquis on 07/15  Today, edema improved to baseline.    Plan:  Discharge on Eliquis 5mg BID  Discontinue Aspirin  Continue Prasugrel 5mg daily  Ambulatory referral for Heme/Onc     Acute cough  Assessment & Plan  Patient presented to ED with 2 day history nonproductive cough associated with rhinorrhea, sore throat   Negative covid/flu/RSV  Patient cough improved, only slight cough present  Rhinorrhea improved, no SOB, denies n/v and chills   Likely viral in origin    Plan:  Patient switched from Mucinex to Robitussin   Continue supportive care at home    ESRD on dialysis ([facility])  Assessment & Plan  Lab Results   Component Value Date    EGFR 10 07/16/2024    EGFR 5 07/15/2024    EGFR 6 07/14/2024    CREATININE 5.49 (H) 07/16/2024    CREATININE 8.75 (H) 07/15/2024    CREATININE 8.33 (H) 07/14/2024     ESRD secondary to diabetes, on HD M/W/F for 4 years, currently on Kidney transplant list  Creatinine today downtrend from 8.75 to 5.49  Baseline creatinine between 4-5  Serum sodium is 134 from 133 yesterday  Serum potassium is normalized at 5.0 from 6.0    Plan:  Nephrology has seen patient  Dialyzed yesterday, 3L of fluid removed  Hyperkalemia resolved after Lokelma 10 g x 1 and dialysis  Continue outpatient hemodialysis M/W/F at [facility]  Take Calcitriol 0.5 mcg 3 times a week  Take Cinacalcet 120 mg 3 times a week    CHF (congestive heart failure) ([facility])  Assessment & Plan  Wt Readings from Last 3 Encounters:   07/16/24 93.3 kg (205 lb 11 oz)    07/05/24 98.4 kg (216 lb 14.9 oz)   07/02/24 94.8 kg (209 lb)       LVEF with echo in February 2023 at [facility] showing EF of 35 to 40%.  Patient denied any shortness of breath, leg swelling or increased weight.  Following cardiologist outpatient and last visit in May.    Plan:  Continue home meds metoprolol, Entresto.  Strict I/O  Daily weights      Anemia  Assessment & Plan  Lab Results   Component Value Date    HGB 9.3 (L) 07/15/2024    HGB 9.8 (L) 07/14/2024    HGB 9.6 (L) 07/06/2024    HGB 10.6 (L) 07/05/2024        Baseline hemoglobin between 10-11  Chronic anemia in the setting of ESRD  Hgb down trending from 9.8 to 9.3 today  Denied any hematemesis, melena, bleeding    Plan  Monitor symptoms    Mixed hyperlipidemia  Assessment & Plan  Continue Lipitor 40mg qhs    Type 2 diabetes mellitus with chronic kidney disease on chronic dialysis, without long-term current use of insulin ([facility])  Assessment & Plan  Lab Results   Component Value Date    HGBA1C 5.9 (H) 07/14/2024       Recent Labs     07/15/24  1515 07/15/24  2133 07/16/24  0718 07/16/24  1121   POCGLU 97 177* 104 118         Blood Sugar Average: Last 72 hrs:  (P) [id number] home medication regimen, controlled with diet  HbgA1c  Regular diet  PCOT glucose checks before meals and bedtime  If blood sugars persistently >200, order sliding scale insulin  Goal -180 while admitted, adjusting insulin regimen as appropriate  Monitor for hypoglycemia and treat per protocol           Medical Problems       Resolved Problems  Date Reviewed: 7/16/2024   None       Discharging Physician / Practitioner: Christine Silva MD  PCP: Raj Auguste DO  Admission Date:   Admission Orders (From admission, onward)       Ordered        07/14/24 1919  INPATIENT ADMISSION  Once                          Discharge Date: 07/16/24    Consultations During Hospital Stay:  Nephrology    Procedures Performed:   Venous duplex lower limb unilateral ultrasound  XR chest 2  "views  Hemodialysis    Significant Findings / Test Results:   Venous duplex ultrasound: showed evidence of acute vs sub acute, occlusive deep vein thrombosis within (one) of the paired peroneal veins at the proximal calf   BMP 07/14 showed Cr of 8.33  BMP 07/15 showed Cr of 8.75  BMP 07/16 showed Cr of 5.49    Incidental Findings:   None    Test Results Pending at Discharge (will require follow up):   None     Outpatient Tests Requested:  None    Complications: None    Reason for Admission: Acute right peroneal DVT    Hospital Course:   Asad Galloway is a 64 y.o. male patient who originally presented to the hospital on 7/14/2024 due to non-productive cough, sneezing, sore throat, and fatigue. He also presented with a swollen right leg that has been increasing in size for the past week, post fall for which he reported to the ED for. Patient received a venous duplex of lower limb which showed a DVT within one of the paired peroneal veins at the proximal calf. Patient was admitted for DVT management. Patient started on heparin drip in ED and switched to Eliquis on discharge. Patient plan for heme/onc consult outpatient for further DVT management/workup. CXR in ED showed increased bilateral infiltrates versus pulmonary congestion. Patient was put on Mucinex 1200 mg BID and received routine vital signs check ins. Patient switched to Robitussin on discharge. Patient received inpatient hemodialysis on 07/15.    Please see above list of diagnoses and related plan for additional information.     Condition at Discharge: good    Discharge Day Visit / Exam:   Subjective:  Patient looked good overall. Improved non productive cough and absent swelling. Denied SOB, rhinorrhea and sore throat.   Vitals: Blood Pressure: 94/76 (07/16/24 1150)  Pulse: 84 (07/16/24 0716)  Temperature: 97.9 °F (36.6 °C) (07/16/24 0716)  Temp Source: Oral (07/14/24 1645)  Respirations: 15 (07/16/24 0716)  Height: 5' 8\" (172.7 cm) (07/14/24 " 2038)  Weight - Scale: 93.3 kg (205 lb 11 oz) (without oxygen tank on bed) (07/16/24 0506)  SpO2: 99 % (07/16/24 0716)  Exam:   Physical Exam  Constitutional:       Appearance: Normal appearance.   HENT:      Head: Normocephalic and atraumatic.      Right Ear: External ear normal.      Left Ear: External ear normal.      Nose: Nose normal.      Mouth/Throat:      Mouth: Mucous membranes are moist.      Pharynx: Oropharynx is clear.   Eyes:      Conjunctiva/sclera: Conjunctivae normal.   Cardiovascular:      Rate and Rhythm: Normal rate and regular rhythm.      Pulses: Normal pulses.      Heart sounds: Normal heart sounds.   Pulmonary:      Effort: Pulmonary effort is normal.      Breath sounds: Normal breath sounds.   Abdominal:      General: Bowel sounds are normal.   Musculoskeletal:         General: No swelling. Normal range of motion.      Right lower leg: No edema.      Left lower leg: No edema.   Skin:     General: Skin is warm and dry.   Neurological:      Mental Status: He is alert and oriented to person, place, and time.   Psychiatric:         Mood and Affect: Mood normal.         Behavior: Behavior normal.         Thought Content: Thought content normal.          Discussion with Family: Updated  (wife) at bedside.    Discharge instructions/Information to patient and family:   See after visit summary for information provided to patient and family.      Provisions for Follow-Up Care:  See after visit summary for information related to follow-up care and any pertinent home health orders.      Mobility at time of Discharge:   Basic Mobility Inpatient Raw Score: 14  JH-HLM Goal: 4: Move to chair/commode  JH-HLM Achieved: 7: Walk 25 feet or more  HLM Goal achieved. Continue to encourage appropriate mobility.     Disposition:   Home    Planned Readmission: No     Discharge Statement:  I spent 45 minutes discharging the patient. This time was spent on the day of discharge. I had direct contact with  the patient on the day of discharge. Greater than 50% of the total time was spent examining patient, answering all patient questions, arranging and discussing plan of care with patient as well as directly providing post-discharge instructions.  Additional time then spent on discharge activities.    Discharge Medications:  See after visit summary for reconciled discharge medications provided to patient and/or family.      **Please Note: This note may have been constructed using a voice recognition system**

## 2024-07-16 NOTE — ASSESSMENT & PLAN NOTE
Wt Readings from Last 3 Encounters:   07/16/24 93.3 kg (205 lb 11 oz)   07/05/24 98.4 kg (216 lb 14.9 oz)   07/02/24 94.8 kg (209 lb)       [place] [facility] [place] 35 [place] 40%.  Patient denied any shortness of breath, leg swelling or increased weight.  Following cardiologist outpatient and last visit in May.    Plan:  Continue home meds metoprolol, Entresto.  Strict I/O  Daily weights

## 2024-07-16 NOTE — ASSESSMENT & PLAN NOTE
Lab Results   Component Value Date    EGFR 10 07/16/2024    EGFR 5 07/15/2024    EGFR 6 07/14/2024    CREATININE 5.49 (H) 07/16/2024    CREATININE 8.75 (H) 07/15/2024    CREATININE 8.33 (H) 07/14/2024     ESRD secondary to diabetes, on HD M/W/F for 4 years, currently on Kidney transplant list  Creatinine today downtrend from 8.75 to 5.49  Baseline creatinine between 4-5  Serum sodium is 134 from 133 yesterday  Serum potassium is normalized at 5.0 from 6.0    Plan:  Nephrology has seen patient  Dialyzed yesterday, 3L of fluid removed  Hyperkalemia resolved after Lokelma 10 g x 1 and dialysis  Continue outpatient hemodialysis M/W/F at [facility]  Take Calcitriol 0.5 mcg 3 times a week  Take Cinacalcet 120 mg 3 times a week

## 2024-07-16 NOTE — ASSESSMENT & PLAN NOTE
Lab Results   Component Value Date    HGBA1C 5.9 (H) 07/14/2024       Recent Labs     07/15/24  1515 07/15/24  2133 07/16/24  0718 07/16/24  1121   POCGLU 97 177* 104 118         Blood Sugar Average: Last 72 hrs:  (P) 116.1665418050424897Fl home medication regimen, controlled with diet  HbgA1c  Regular diet  PCOT glucose checks before meals and bedtime  If blood sugars persistently >200, order sliding scale insulin  Goal -180 while admitted, adjusting insulin regimen as appropriate  Monitor for hypoglycemia and treat per protocol

## 2024-07-16 NOTE — DISCHARGE INSTR - AVS FIRST PAGE
Dear Asad Galloway,     It was our pleasure to care for you here at [facility].  It is our hope that we were always able to exceed the expected standards for your care during your stay.  You were hospitalized due to acute DVT.  You were cared for on the second floor by Christine Silva MD under the service of Coby Kovacs MD with the [facility] who covers for your primary care physician (PCP), Raj Auguste DO, while you were hospitalized.  If you have any questions or concerns related to this hospitalization, you may contact us at .  For follow up as well as any medication refills, we recommend that you follow up with your primary care physician.  A registered nurse will reach out to you by phone within a few days after your discharge to answer any additional questions that you may have after going home.  However, at this time we provide for you here, the most important instructions / recommendations at discharge:     Notable Medication Adjustments -   Take Eliquis 5 mg 2 times a day  Take calcitriol 0.5 mcg 3 times a week  Take Cinacalcet 120 mg 3 times a week  Continue taking prasugrel 5 mg daily  Stop aspirin 81 mg  Testing Required after Discharge -   None  Important follow up information -   Follow-up with your PCP, Dr. Raj Auguste, in 2 to 3 weeks  Follow-up with heme/onc specialists, Dr. Randolph Walter, for your DVT  Other Instructions -   Continue regular hemodialysis sessions  Please review this entire after visit summary as additional general instructions including medication list, appointments, activity, diet, any pertinent wound care, and other additional recommendations from your care team that may be provided for you.      Sincerely,     Christine Silva MD

## 2024-07-16 NOTE — PROGRESS NOTES
HCC coding opportunities          Chart Reviewed number of suggestions sent to Provider: 2    E11.3513  I13.2    Patients Insurance     Medicare Insurance: Medicare

## 2024-07-16 NOTE — OCCUPATIONAL THERAPY NOTE
Occupational Therapy Evaluation     Patient Name: Asda Galloway  Today's Date: 7/16/2024  Problem List  Principal Problem:    Acute deep vein thrombosis (DVT) of right peroneal vein (HCC)  Active Problems:    Type 2 diabetes mellitus with chronic kidney disease on chronic dialysis, without long-term current use of insulin (HCC)    Mixed hyperlipidemia    Anemia    ESRD on dialysis (HCC)    CHF (congestive heart failure) (HCC)    Acute cough    Past Medical History  Past Medical History:   Diagnosis Date    Cerebrovascular accident (CVA) due to thrombosis of left middle cerebral artery (Formerly [facility]) 07/29/2018    Chronic kidney disease     Coronary artery disease     Diabetes mellitus (HCC)     not on meds    Dialysis patient (Formerly [facility])     M-W-F    Fistula     left upper arm for hemodialyis    GERD (gastroesophageal reflux disease)     History of coronary artery stent placement     x3    Hypercholesteremia     Hyperlipidemia     Hypertension     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    Limb alert care status     no BP/IV left arm    Neuropathy     Obesity     Osteomyelitis (HCC)     last assessed 11/4/16-per son not currently    PVC's (premature ventricular contractions)     sees [facility]    Stroke (Formerly [facility])     last weeof July 2018 [facility]    TIA (transient ischemic attack) 10/28/2018    Wears dentures     Wears glasses      Past Surgical History  Past Surgical History:   Procedure Laterality Date    ABDOMINAL SURGERY      CARDIAC CATHETERIZATION N/A 05/02/2022    Procedure: Cardiac Coronary Angiogram;  Surgeon: Sam Davis MD;  [facility];  Service: Cardiology    CARDIAC CATHETERIZATION N/A 05/02/2022    Procedure: Cardiac pci;  Surgeon: Sam Davis MD;  [facility];  Service: Cardiology    CARDIAC CATHETERIZATION  02/01/2023    Procedure: Cardiac catheterization;  Surgeon: Sam Davis MD;  [facility];  Service: Cardiology    CARDIAC  "CATHETERIZATION N/A 02/01/2023    Procedure: Cardiac pci;  Surgeon: Sam Davis MD;  [facility];  Service: Cardiology    CARDIAC CATHETERIZATION N/A 02/01/2023    Procedure: Cardiac Coronary Angiogram;  Surgeon: Sam Davis MD;  [facility];  Service: Cardiology    CARDIAC CATHETERIZATION N/A 02/01/2023    Procedure: Cardiac other-IVUS;  Surgeon: Sam Davis MD;  [facility];  Service: Cardiology    CHOLECYSTECTOMY      Percutaneous    COLONOSCOPY      CYSTOSCOPY      HEMODIALYSIS ADULT  1/22/2024    HEMODIALYSIS ADULT  1/24/2024    IR LOWER EXTREMITY ANGIOGRAM  9/29/2023    IR LOWER EXTREMITY ANGIOGRAM  2/26/2024    OTHER SURGICAL HISTORY      \"stimulator to control bowel movements\"    NY ESOPHAGOGASTRODUODENOSCOPY TRANSORAL DIAGNOSTIC N/A 09/27/2016    Procedure: ESOPHAGOGASTRODUODENOSCOPY (EGD);  Surgeon: Adele Rowe MD;  Location: [facility] GI LAB;  Service: Gastroenterology    NY LAPAROSCOPY SURG CHOLECYSTECTOMY N/A 02/29/2016    Procedure: LAPAROSCOPIC CHOLECYSTECTOMY ;  Surgeon: Cliff Roman DO;  Location: [facility];  Service: General    NY SLCTV CATHJ 3RD+ ORD SLCTV ABDL PEL/LXTR BRNCH Left 9/29/2023    Procedure: Left leg angiogram with intervention;  Surgeon: Michelle Galvan MD;  Location: [facility];  Service: Vascular    NY SLCTV CATHJ 3RD+ ORD SLCTV ABDL PEL/LXTR BRNCH Left 2/26/2024    Procedure: Left leg angiogram antegrade access, left popliteal angioplasty;  Surgeon: Michelle Galvan MD;  Location: [facility];  Service: Vascular    ROTATOR CUFF REPAIR Right     SPINAL CORD STIMULATOR IMPLANT      \"Medtronic interstim model # 3058- in lower back to control bowel movements-currently turned off-battery is dead\"    TOE AMPUTATION Right 10/28/2016    Procedure: 3RD TOE AMPUTATION ;  Surgeon: Anjel Salas DPM;  Location: [facility];  Service:          07/16/24 1201   OT Last Visit   OT Visit Date 07/16/24   Note Type   Note type " "Evaluation   Pain Assessment   Pain Assessment Tool 0-10   Pain Score No Pain   Restrictions/Precautions   Braces or Orthoses   (left lower extremity boot/offloading left heel, patient wears at night only per PT note)   Other Precautions Chair Alarm;Cognitive;Bed Alarm;Fall Risk;Multiple lines  (james)   Home Living   Type of Home House   Home Layout Two level;Able to live on main level with bedroom/bathroom;Performs ADLs on one level  (4 steps between levels)   Bathroom Shower/Tub Walk-in shower   Bathroom Toilet Raised   Bathroom Equipment Grab bars in shower;Shower chair;Grab bars around toilet   Bathroom Accessibility Accessible   Home Equipment Cane;Walker   Additional Comments pt spouse home w/ him 24/7 or if she is away family is w/ him (-) alone   Prior Function   Level of [place] Needs assistance with ADLs;Needs assistance with functional mobility;Needs assistance with IADLS   Lives With Spouse;Family   Receives Help From Family   IADLs Family/Friend/Other provides transportation;Family/Friend/Other provides meals;Family/Friend/Other provides medication management   Falls in the last 6 months 0   Vocational Retired   Comments pt reports use of RW in home and community, supportive family, spouse assists w/ ADLs and does IADLs   Lifestyle   Autonomy per pt setup grooming and UB ADLs, assist LB ADLs, supervision functional transfers and mobility w/ RW, assist w/ IADLs, transport   Reciprocal Relationships spouse and family   Service to Others retired    Intrinsic Gratification spend time w/ grandkids   Subjective   Subjective \"My grandkids are my life\"   ADL   Where Assessed Chair   Eating Assistance 5  Supervision/Setup   Grooming Assistance 4  Minimal Assistance   UB Bathing Assistance 4  Minimal Assistance   LB Bathing Assistance 4  Minimal Assistance   UB Dressing Assistance 4  Minimal Assistance   LB Dressing Assistance 3  Moderate Assistance   Toileting Assistance  4  Minimal " Assistance   Functional Assistance 4  Minimal Assistance   Bed Mobility   Additional Comments seated in recliner pre/post session   Transfers   Sit to Stand 4  Minimal assistance   Additional items Assist x 1;Increased time required;Verbal cues;Armrests   Stand to Sit 4  Minimal assistance   Additional items Assist x 1;Increased time required;Verbal cues  (cues for hand placement and positioning)   Toilet transfer 4  Minimal assistance   Additional items Assist x 1;Increased time required;Verbal cues;Standard toilet  (grab bar use)   Functional Mobility   Functional Mobility 4  Minimal assistance   Additional Comments assist x1 w/ increased brook to complete in homelike environment and cues to stay within RW   Additional items Rolling walker   Balance   Static Sitting Good   Dynamic Sitting Fair +   Static Standing Fair -   Dynamic Standing Fair -   Ambulatory Poor +   Activity Tolerance   Activity Tolerance Patient limited by fatigue;Treatment limited secondary to medical complications (Comment)   Nurse Made Aware appropriate to see per Bc PA Assessment   RUE Assessment WFL  (grossly 4-/5)   LUE Assessment   LUE Assessment WFL  (limb alert, distal 4-/5)   Hand Function   Gross Motor Coordination Functional   Fine Motor Coordination Functional   Sensation   Light Touch No apparent deficits   Proprioception   Proprioception No apparent deficits   Vision-Basic Assessment   Current Vision Wears glasses all the time   Vision - Complex Assessment   Ocular Range of Motion Intact   Acuity Able to read clock/calendar on wall without difficulty   Perception   Inattention/Neglect Appears intact   Cognition   Overall Cognitive Status Impaired   Arousal/Participation Responsive;Cooperative   Attention Attends with cues to redirect   Orientation Level Oriented to person;Oriented to place;Disoriented to time;Disoriented to situation   Memory Decreased recall of recent events;Decreased short term memory;Decreased recall  of precautions   Following Commands Follows one step commands with increased time or repetition   Comments pleasant and cooperative, pt tearful about talking schwab much he loves his wife and kids and grandkids   Assessment   Limitation Decreased ADL status;Decreased Safe judgement during ADL;Decreased UE strength;Decreased cognition;Decreased endurance;Decreased self-care trans;Decreased high-level ADLs   Prognosis Good   Assessment Pt is a 64 y.o. male seen for OT evaluation s/p admit to [facility] on 7/14/2024 w/ cough, sneezing; Acute deep vein thrombosis (DVT) of right peroneal vein (HCC).  Comorbidities affecting pt's functional performance at time of assessment include: CHF, h/o CVA w/ left MCA. DM II, ESRD on HD, atherosclerosis of native artery of L LE w/ ulceration of heel, generalized weakness, anemia. Personal factors affecting pt at time of IE include:limited home support, difficulty performing ADLS, difficulty performing IADLS , and limited insight into deficits. Prior to admission, pt was living w/ spouse and has 24/7care and reports:per pt setup grooming and UB ADLs, assist LB ADLs, supervision functional transfers and mobility w/ RW, assist w/ IADLs, transport . Upon evaluation: Pt requires min assist sit<>stand functional transfers, MIN assist functional mobility w/ RW, MIN assist UB ADLs, MIN-MOD assist LB ADLs, 2* the following deficits impacting occupational performance: decreased strength and endurance, impaired balance, impaired activity tolerance, fall risk, multiple lines, impaired functional reach, impaired STM, cues for safe positioning within RW. Pt to benefit from continued skilled OT tx while in the hospital to address deficits as defined above and maximize level of functional independence w ADL's and functional mobility. Occupational Performance areas to address include: grooming, bathing/shower, toilet hygiene, dressing, health maintenance, functional mobility, clothing management, cleaning,  "and meal prep. From OT standpoint, recommendation at time of d/c would be level II moderate resources.   The patient's raw score on the AM-PAC Daily Activity Inpatient Short Form is 16. A raw score of less than 19 suggests the patient may benefit from discharge to post-acute rehabilitation services. Please refer to the recommendation of the Occupational Therapist for safe discharge planning.   Goals   Patient Goals \"to go home today\"   LTG Time Frame 10-14   Long Term Goal please see below goals   Plan   Treatment Interventions ADL retraining;UE strengthening/ROM;Functional transfer training;Endurance training;Cognitive reorientation;Patient/family training;Equipment evaluation/education;Compensatory technique education;Activityengagement;Energy conservation   Goal Expiration Date 07/30/24   OT Frequency 2-3x/wk   Discharge Recommendation   Rehab Resource Intensity Level, OT III (Minimum Resource Intensity)  (w/ continued 24/7 care)   -[facility] Daily Activity Inpatient   Lower Body Dressing 2   Bathing 2   Toileting 3   Upper Body Dressing 3   Grooming 3   Eating 3   Daily Activity Raw Score 16   Daily Activity Standardized Score (Calc for Raw Score >=11) 35.96   AM-PAC Applied Cognition Inpatient   Following a Speech/Presentation 3   Understanding Ordinary Conversation 4   Taking Medications 1   Remembering Where Things Are Placed or Put Away 2   Remembering List of 4-5 Errands 2   Taking Care of Complicated Tasks 2   Applied Cognition Raw Score 14   Applied Cognition Standardized Score 32.02   Modified [place] Scale   Modified [place] Scale 4   End of Consult   Education Provided Yes   Patient Position at End of Consult Bed/Chair alarm activated;All needs within reach;Bedside chair   Nurse Communication Nurse aware of consult     Occupational Therapy Goals to be met in 10-14 days:  1) Pt will improve activity tolerance to G for 30 min txment sessions to enhance ADLs  2) Pt will complete UB ADLs/self care w/ setup and " min assist LB ADLs  3) Pt will complete toileting w/ supervision w/ G hygiene/thoroughness using DME PRN  4) Pt will improve functional transfers on/off all surfaces using DME PRN w/ G balance/safety including toileting w/ supervision  5) Pt will improve fx'l mobility during I/ADl/leisure tasks using DME PRN w/ g balance/safety w/ supervision  6) Pt will engage in ongoing cognitive assessment w/ G participation to A w/ safe d/c planning/recommendations  7) Pt will demonstrate G carryover of pt/caregiver education and training as appropriate w/ mod I  w/ G tolerance  8) Pt will engage in depression screen/leisure interest checklist w/ G participation to monitor s/s depression and ID 3 positive coping strategies to A w/ emotional regulation and management  9) Pt will demonstrate 100% carryover of E.C. techniques w/ mod I t/o fx'l I/ADL/leisure tasks w/o cues s/p skilled education  10) Pt will demonstrate improved bed mobility to supervision to enhance ADLs  11) Pt will engage in activity configuration activity w/ G participation and mod I to increase time management skills and improve participation in a structured routine to improve overall quality of life  12) Pt will demonstrate improved standing tolerance to 3-5 minutes during functional tasks w/ no LOB to enhance ADL performance  13) Pt will demonstrate improved b/l UE strength by 1 MMT grade to enhance ADLS and functional transfers     Documentation completed by: Tarah Van MS, OTR/L

## 2024-07-16 NOTE — ASSESSMENT & PLAN NOTE
Lab Results   Component Value Date    HGB 9.3 (L) 07/15/2024    HGB 9.8 (L) 07/14/2024    HGB 9.6 (L) 07/06/2024    HGB 10.6 (L) 07/05/2024        Baseline hemoglobin between 10-11  Chronic anemia in the setting of ESRD  Hgb down trending from 9.8 to 9.3 today  Denied any hematemesis, melena, bleeding    Plan  Monitor symptoms

## 2024-07-16 NOTE — PLAN OF CARE
Problem: OCCUPATIONAL THERAPY ADULT  Goal: Performs self-care activities at highest level of function for planned discharge setting.  See evaluation for individualized goals.  Description: Treatment Interventions: ADL retraining, UE strengthening/ROM, Functional transfer training, Endurance training, Cognitive reorientation, Patient/family training, Equipment evaluation/education, Compensatory technique education, Activityengagement, Energy conservation          See flowsheet documentation for full assessment, interventions and recommendations.   Note: Limitation: Decreased ADL status, Decreased Safe judgement during ADL, Decreased UE strength, Decreased cognition, Decreased endurance, Decreased self-care trans, Decreased high-level ADLs  Prognosis: Good  Assessment: Pt is a 64 y.o. male seen for OT evaluation s/p admit to [facility] on 7/14/2024 w/ cough, sneezing; Acute deep vein thrombosis (DVT) of right peroneal vein (HCC).  Comorbidities affecting pt's functional performance at time of assessment include: CHF, h/o CVA w/ left MCA. DM II, ESRD on HD, atherosclerosis of native artery of L LE w/ ulceration of heel, generalized weakness, anemia. Personal factors affecting pt at time of IE include:limited home support, difficulty performing ADLS, difficulty performing IADLS , and limited insight into deficits. Prior to admission, pt was living w/ spouse and has 24/7care and reports:per pt setup grooming and UB ADLs, assist LB ADLs, supervision functional transfers and mobility w/ RW, assist w/ IADLs, transport . Upon evaluation: Pt requires min assist sit<>stand functional transfers, MIN assist functional mobility w/ RW, MIN assist UB ADLs, MIN-MOD assist LB ADLs, 2* the following deficits impacting occupational performance: decreased strength and endurance, impaired balance, impaired activity tolerance, fall risk, multiple lines, impaired functional reach, impaired STM, cues for safe positioning within RW. Pt to  benefit from continued skilled OT tx while in the hospital to address deficits as defined above and maximize level of functional independence w ADL's and functional mobility. Occupational Performance areas to address include: grooming, bathing/shower, toilet hygiene, dressing, health maintenance, functional mobility, clothing management, cleaning, and meal prep. From OT standpoint, recommendation at time of d/c would be level II moderate resources.   The patient's raw score on the AM-PAC Daily Activity Inpatient Short Form is 16. A raw score of less than 19 suggests the patient may benefit from discharge to post-acute rehabilitation services. Please refer to the recommendation of the Occupational Therapist for safe discharge planning.     Rehab Resource Intensity Level, OT: III (Minimum Resource Intensity) (w/ continued 24/7 care)

## 2024-07-16 NOTE — PROGRESS NOTES
NEPHROLOGY HOSPITAL PROGRESS NOTE   Asad Galloway 64 y.o. male MRN: [id number]  Unit/Bed#: S -01 Encounter: [id number]  Reason for Consult: ESRD on HD     ASSESSMENT and PLAN:    1.  ESRD on HD.  MWF at [facility].  Access is left upper extremity AV fistula.  EDW of 94.6 kg.  Last inpatient treatment 07/15.  Electrolytes and volume status stable today.  Next in-patient treatment 07/17 if still in house.     2.  Hyperkalemia.  This is currently resolved status post HD on 07/15.  Serum potassium level today 5.0.     3.  Hypertension/volume status.  Current Rx: Toprol-XL 50 mg twice daily, Entresto 24-26 mg daily.  Blood pressure is currently controlled on current regimen.     4.  Hyponatremia.  Serum sodium level improved to 134 today.  Continue fluid restriction.     5.  Anemia in ESRD.  Not on BAYLEE due to history of CVA.  Current hemoglobin 9.3 on 07/15.    6.  Secondary hyperparathyroidism.  Continue calcitriol 0.5 mcg 3 times a week, Sensipar 120 mg 3 times a week.     7.  Acute right peroneal DVT.  Started on Eliquis 5 mg twice daily.  Discussed with outpatient nephrologist and they agree with 5 mg twice daily.     8.  PAD with left heel tissue loss.  Continue local wound care.  Outpatient follow-up with vascular surgery.    Discussed with internal medicine team.  After discussion, we agreed that patient is currently stable for interdialytic period and next hemodialysis session will be planned for tomorrow.    SUBJECTIVE / 24H INTERVAL HISTORY:  He is feeling well.  He had dialysis yesterday without event.  He denies dyspnea.  He denies leg swelling.    REVIEW OF SYSTEMS:  Review of Systems   Constitutional:  Negative for chills and fever.   HENT:  Negative for ear pain and sore throat.    Eyes:  Negative for pain and visual disturbance.   Respiratory:  Negative for cough and shortness of breath.    Cardiovascular:  Negative for chest pain and palpitations.   Gastrointestinal:  Negative for abdominal pain  and vomiting.   Genitourinary:  Negative for dysuria and hematuria.   Musculoskeletal:  Negative for arthralgias and back pain.   Skin:  Negative for color change and rash.   Neurological:  Negative for seizures and syncope.   All other systems reviewed and are negative.      OBJECTIVE:  Current Weight: Weight - Scale: 93.3 kg (205 lb 11 oz) (without oxygen tank on bed)  Vitals:    07/15/24 1700 07/15/24 1715 07/16/24 0558 07/16/24 0716   BP: (!) 114/46 133/64  114/78   BP Location: Right arm Right arm     Pulse: 83 87  84   Resp: 18 18  15   Temp:  98.4 °F (36.9 °C)  97.9 °F (36.6 °C)   TempSrc:       SpO2:    99%   Weight:   93.3 kg (205 lb 11 oz)    Height:           Intake/Output Summary (Last 24 hours) at 7/16/2024 0756  Last data filed at 7/15/2024 1825  Gross per 24 hour   Intake 1020 ml   Output 3550 ml   Net -2530 ml     Physical Exam  Vitals and nursing note reviewed.   Constitutional:       General: He is not in acute distress.     Appearance: He is well-developed.   HENT:      Head: Normocephalic and atraumatic.   Eyes:      Conjunctiva/sclera: Conjunctivae normal.   Cardiovascular:      Rate and Rhythm: Normal rate and regular rhythm.      Pulses: Normal pulses.      Heart sounds: Normal heart sounds. No murmur heard.     Comments: Left upper extremity AV fistula with positive thrill and bruit and aneurysmal dilatation  Pulmonary:      Effort: Pulmonary effort is normal. No respiratory distress.      Breath sounds: Normal breath sounds.   Abdominal:      Palpations: Abdomen is soft.      Tenderness: There is no abdominal tenderness.   Musculoskeletal:         General: No swelling.      Cervical back: Neck supple.      Right lower leg: No edema.      Left lower leg: No edema.   Skin:     General: Skin is warm and dry.      Capillary Refill: Capillary refill takes less than 2 seconds.   Neurological:      Mental Status: He is alert.   Psychiatric:         Mood and Affect: Mood normal.        Medications:    Current Facility-Administered Medications:     acetaminophen (TYLENOL) tablet 650 mg, 650 mg, Oral, Q6H PRN, Jazz Dominguezuse, DO    apixaban (ELIQUIS) tablet 5 mg, 5 mg, Oral, BID, Eren Yousif MD    aspirin chewable tablet 81 mg, 81 mg, Oral, Daily, Eren Yousif MD, 81 mg at 07/15/24 1237    atorvastatin (LIPITOR) tablet 40 mg, 40 mg, Oral, Daily With Dinner, Jazz Ovalle, DO, 40 mg at 07/15/24 1758    Cholecalciferol (VITAMIN D3) tablet 1,000 Units, 1,000 Units, Oral, Daily, Jazz Ovalle DO, 1,000 Units at 07/15/24 0906    divalproex sodium (DEPAKOTE SPRINKLE) capsule 250 mg, 250 mg, Oral, Q12H MICH, Jazz Ovalle, DO, 250 mg at 07/15/24 0912    guaiFENesin (ROBITUSSIN) oral liquid 400 mg, 400 mg, Oral, Q4H PRN, Jazz Dominguezuse, DO, 400 mg at 07/15/24 0922    metoprolol succinate (TOPROL-XL) 24 hr tablet 50 mg, 50 mg, Oral, BID, Jazz Dominguezuse, DO, 50 mg at 07/15/24 1759    ondansetron (ZOFRAN) injection 4 mg, 4 mg, Intravenous, Q6H PRN, Jazz Dominguezuse, DO, 4 mg at 07/15/24 2201    prasugrel (EFFIENT) tablet 5 mg, 5 mg, Oral, Daily, Jazz Dominguezuse, DO, 5 mg at 07/15/24 0953    sacubitril-valsartan (ENTRESTO) 24-26 MG per tablet 1 tablet, 1 tablet, Oral, Daily, Jazz Ovalle, DO, 1 tablet at 07/15/24 1759    sevelamer (RENAGEL) oral suspension 2,400 mg, 2,400 mg, Oral, TID With Meals, Jazz Ovalle DO, 2,400 mg at 07/15/24 2095    Laboratory Results:  Results from last 7 days   Lab Units 07/16/24  0445 07/15/24  0623 07/15/24  0450 07/14/24  1901   WBC Thousand/uL  --   --  7.39 6.20   HEMOGLOBIN g/dL  --   --  9.3* 9.8*   HEMATOCRIT %  --   --  29.0* 31.6*   PLATELETS Thousands/uL  --   --  136* 144*   POTASSIUM mmol/L 5.0 6.0*  --  4.9   CHLORIDE mmol/L 96 96  --  94*   CO2 mmol/L 30 23  --  28   BUN mg/dL 31* 70*  --  65*   CREATININE mg/dL 5.49* 8.75*  --  8.33*   CALCIUM  "mg/dL 8.6 8.3*  --  8.9   MAGNESIUM mg/dL  --  2.1  --   --    PHOSPHORUS mg/dL  --  5.9*  --   --        Portions of the record may have been created with voice recognition software. Occasional wrong word or \"sound a like\" substitutions may have occurred due to the inherent limitations of voice recognition software. Read the chart carefully and recognize, using context, where substitutions have occurred. If you have any questions, please contact the dictating provider.    "

## 2024-07-16 NOTE — ASSESSMENT & PLAN NOTE
Patient had right lower extremity for the past week, post leg injury, found to have a DVT on duplex ultrasound on 07/14  Patient started on heparin drip in ED, transitioned to Eliquis on 07/15  Today, edema improved to baseline.    Plan:  Discharge on Eliquis 5mg BID  Discontinue Aspirin  Continue Prasugrel 5mg daily  Ambulatory referral for Heme/Onc

## 2024-07-17 ENCOUNTER — APPOINTMENT (EMERGENCY)
Dept: RADIOLOGY | Facility: HOSPITAL | Age: 64
DRG: 555 | End: 2024-07-17
Payer: MEDICARE

## 2024-07-17 ENCOUNTER — APPOINTMENT (EMERGENCY)
Dept: CT IMAGING | Facility: HOSPITAL | Age: 64
DRG: 555 | End: 2024-07-17
Payer: MEDICARE

## 2024-07-17 ENCOUNTER — TELEPHONE (OUTPATIENT)
Age: 64
End: 2024-07-17

## 2024-07-17 ENCOUNTER — TRANSITIONAL CARE MANAGEMENT (OUTPATIENT)
Dept: INTERNAL MEDICINE CLINIC | Facility: CLINIC | Age: 64
End: 2024-07-17

## 2024-07-17 ENCOUNTER — HOSPITAL ENCOUNTER (INPATIENT)
Facility: HOSPITAL | Age: 64
LOS: 3 days | Discharge: HOME | DRG: 555 | End: 2024-07-21
Attending: SURGERY | Admitting: INTERNAL MEDICINE
Payer: MEDICARE

## 2024-07-17 DIAGNOSIS — I50.9 CHF (CONGESTIVE HEART FAILURE) (HCC): ICD-10-CM

## 2024-07-17 DIAGNOSIS — Z99.2 ESRD ON DIALYSIS (HCC): ICD-10-CM

## 2024-07-17 DIAGNOSIS — N18.6 ESRD ON DIALYSIS (HCC): ICD-10-CM

## 2024-07-17 DIAGNOSIS — R94.31 QT PROLONGATION: ICD-10-CM

## 2024-07-17 DIAGNOSIS — R55 SYNCOPE AND COLLAPSE: ICD-10-CM

## 2024-07-17 DIAGNOSIS — R42 DIZZINESS: Primary | ICD-10-CM

## 2024-07-17 DIAGNOSIS — R79.89 ELEVATED TROPONIN: ICD-10-CM

## 2024-07-17 PROBLEM — E66.9 OBESITY (BMI 30.0-34.9): Status: ACTIVE | Noted: 2024-07-17

## 2024-07-17 PROBLEM — I82.409 DEEP VEIN THROMBOSIS (DVT) OF LOWER EXTREMITY (HCC): Status: ACTIVE | Noted: 2024-07-17

## 2024-07-17 PROBLEM — I50.42 CHRONIC COMBINED SYSTOLIC AND DIASTOLIC HEART FAILURE (HCC): Status: ACTIVE | Noted: 2024-01-09

## 2024-07-17 PROBLEM — D75.89 BICYTOPENIA: Status: ACTIVE | Noted: 2024-07-17

## 2024-07-17 PROBLEM — E66.811 OBESITY (BMI 30.0-34.9): Status: ACTIVE | Noted: 2024-07-17

## 2024-07-17 PROBLEM — I82.401 ACUTE DEEP VEIN THROMBOSIS (DVT) OF RIGHT LOWER EXTREMITY (HCC): Status: ACTIVE | Noted: 2024-07-17

## 2024-07-17 LAB
2HR DELTA HS TROPONIN: -7 NG/L
4HR DELTA HS TROPONIN: -121 NG/L
ANION GAP SERPL CALCULATED.3IONS-SCNC: 10 MMOL/L (ref 4–13)
BASE EXCESS BLDA CALC-SCNC: 3 MMOL/L (ref -2–3)
BASOPHILS # BLD AUTO: 0.01 THOUSANDS/ÂΜL (ref 0–0.1)
BASOPHILS NFR BLD AUTO: 0 % (ref 0–1)
BUN SERPL-MCNC: 50 MG/DL (ref 5–25)
CA-I BLD-SCNC: 1.09 MMOL/L (ref 1.12–1.32)
CALCIUM SERPL-MCNC: 8.3 MG/DL (ref 8.4–10.2)
CARDIAC TROPONIN I PNL SERPL HS: 300 NG/L
CARDIAC TROPONIN I PNL SERPL HS: 414 NG/L
CARDIAC TROPONIN I PNL SERPL HS: 421 NG/L
CHLORIDE SERPL-SCNC: 96 MMOL/L (ref 96–108)
CO2 SERPL-SCNC: 27 MMOL/L (ref 21–32)
CREAT SERPL-MCNC: 7.54 MG/DL (ref 0.6–1.3)
EOSINOPHIL # BLD AUTO: 0.07 THOUSAND/ÂΜL (ref 0–0.61)
EOSINOPHIL NFR BLD AUTO: 1 % (ref 0–6)
ERYTHROCYTE [DISTWIDTH] IN BLOOD BY AUTOMATED COUNT: 15.9 % (ref 11.6–15.1)
GFR SERPL CREATININE-BSD FRML MDRD: 6 ML/MIN/1.73SQ M
GLUCOSE SERPL-MCNC: 100 MG/DL (ref 65–140)
GLUCOSE SERPL-MCNC: 96 MG/DL (ref 65–140)
GLUCOSE SERPL-MCNC: 97 MG/DL (ref 65–140)
GLUCOSE SERPL-MCNC: 99 MG/DL (ref 65–140)
HCO3 BLDA-SCNC: 28.9 MMOL/L (ref 24–30)
HCT VFR BLD AUTO: 27.7 % (ref 36.5–49.3)
HCT VFR BLD CALC: 25 % (ref 36.5–49.3)
HGB BLD-MCNC: 8.7 G/DL (ref 12–17)
HGB BLDA-MCNC: 8.5 G/DL (ref 12–17)
IMM GRANULOCYTES # BLD AUTO: 0.05 THOUSAND/UL (ref 0–0.2)
IMM GRANULOCYTES NFR BLD AUTO: 1 % (ref 0–2)
LYMPHOCYTES # BLD AUTO: 0.42 THOUSANDS/ÂΜL (ref 0.6–4.47)
LYMPHOCYTES NFR BLD AUTO: 8 % (ref 14–44)
MCH RBC QN AUTO: 31.8 PG (ref 26.8–34.3)
MCHC RBC AUTO-ENTMCNC: 31.4 G/DL (ref 31.4–37.4)
MCV RBC AUTO: 101 FL (ref 82–98)
MONOCYTES # BLD AUTO: 0.63 THOUSAND/ÂΜL (ref 0.17–1.22)
MONOCYTES NFR BLD AUTO: 12 % (ref 4–12)
NEUTROPHILS # BLD AUTO: 4.05 THOUSANDS/ÂΜL (ref 1.85–7.62)
NEUTS SEG NFR BLD AUTO: 78 % (ref 43–75)
NRBC BLD AUTO-RTO: 0 /100 WBCS
PCO2 BLD: 30 MMOL/L (ref 21–32)
PCO2 BLD: 47.4 MM HG (ref 42–50)
PH BLD: 7.39 [PH] (ref 7.3–7.4)
PLATELET # BLD AUTO: 119 THOUSANDS/UL (ref 149–390)
PMV BLD AUTO: 10.3 FL (ref 8.9–12.7)
PO2 BLD: 22 MM HG (ref 35–45)
POTASSIUM BLD-SCNC: 5 MMOL/L (ref 3.5–5.3)
POTASSIUM SERPL-SCNC: 5 MMOL/L (ref 3.5–5.3)
RBC # BLD AUTO: 2.74 MILLION/UL (ref 3.88–5.62)
SAO2 % BLD FROM PO2: 36 % (ref 60–85)
SODIUM BLD-SCNC: 135 MMOL/L (ref 136–145)
SODIUM SERPL-SCNC: 133 MMOL/L (ref 135–147)
SPECIMEN SOURCE: ABNORMAL
TSH SERPL DL<=0.05 MIU/L-ACNC: 1.56 UIU/ML (ref 0.45–4.5)
WBC # BLD AUTO: 5.23 THOUSAND/UL (ref 4.31–10.16)

## 2024-07-17 PROCEDURE — 85014 HEMATOCRIT: CPT

## 2024-07-17 PROCEDURE — 73551 X-RAY EXAM OF FEMUR 1: CPT

## 2024-07-17 PROCEDURE — 76604 US EXAM CHEST: CPT | Performed by: SURGERY

## 2024-07-17 PROCEDURE — 99223 1ST HOSP IP/OBS HIGH 75: CPT | Performed by: INTERNAL MEDICINE

## 2024-07-17 PROCEDURE — 99284 EMERGENCY DEPT VISIT MOD MDM: CPT | Performed by: INTERNAL MEDICINE

## 2024-07-17 PROCEDURE — 99285 EMERGENCY DEPT VISIT HI MDM: CPT | Performed by: SURGERY

## 2024-07-17 PROCEDURE — 82947 ASSAY GLUCOSE BLOOD QUANT: CPT

## 2024-07-17 PROCEDURE — 82803 BLOOD GASES ANY COMBINATION: CPT

## 2024-07-17 PROCEDURE — 80048 BASIC METABOLIC PNL TOTAL CA: CPT | Performed by: SURGERY

## 2024-07-17 PROCEDURE — EDAIR PR ED AIR: Performed by: EMERGENCY MEDICINE

## 2024-07-17 PROCEDURE — 85025 COMPLETE CBC W/AUTO DIFF WBC: CPT | Performed by: SURGERY

## 2024-07-17 PROCEDURE — 99285 EMERGENCY DEPT VISIT HI MDM: CPT

## 2024-07-17 PROCEDURE — 71045 X-RAY EXAM CHEST 1 VIEW: CPT

## 2024-07-17 PROCEDURE — 82330 ASSAY OF CALCIUM: CPT

## 2024-07-17 PROCEDURE — 84443 ASSAY THYROID STIM HORMONE: CPT | Performed by: INTERNAL MEDICINE

## 2024-07-17 PROCEDURE — 99215 OFFICE O/P EST HI 40 MIN: CPT | Performed by: INTERNAL MEDICINE

## 2024-07-17 PROCEDURE — 84132 ASSAY OF SERUM POTASSIUM: CPT

## 2024-07-17 PROCEDURE — 93005 ELECTROCARDIOGRAM TRACING: CPT

## 2024-07-17 PROCEDURE — 70450 CT HEAD/BRAIN W/O DYE: CPT

## 2024-07-17 PROCEDURE — 82948 REAGENT STRIP/BLOOD GLUCOSE: CPT

## 2024-07-17 PROCEDURE — 93308 TTE F-UP OR LMTD: CPT | Performed by: SURGERY

## 2024-07-17 PROCEDURE — 84484 ASSAY OF TROPONIN QUANT: CPT | Performed by: SURGERY

## 2024-07-17 PROCEDURE — 36415 COLL VENOUS BLD VENIPUNCTURE: CPT | Performed by: SURGERY

## 2024-07-17 PROCEDURE — 72125 CT NECK SPINE W/O DYE: CPT

## 2024-07-17 PROCEDURE — 84295 ASSAY OF SERUM SODIUM: CPT

## 2024-07-17 PROCEDURE — 5A1D70Z PERFORMANCE OF URINARY FILTRATION, INTERMITTENT, LESS THAN 6 HOURS PER DAY: ICD-10-PCS | Performed by: INTERNAL MEDICINE

## 2024-07-17 PROCEDURE — 76705 ECHO EXAM OF ABDOMEN: CPT | Performed by: SURGERY

## 2024-07-17 RX ORDER — CALCITRIOL 0.25 UG/1
0.5 CAPSULE, LIQUID FILLED ORAL 3 TIMES WEEKLY
Status: DISCONTINUED | OUTPATIENT
Start: 2024-07-17 | End: 2024-07-21 | Stop reason: HOSPADM

## 2024-07-17 RX ORDER — ATORVASTATIN CALCIUM 40 MG/1
40 TABLET, FILM COATED ORAL
Status: DISCONTINUED | OUTPATIENT
Start: 2024-07-17 | End: 2024-07-21 | Stop reason: HOSPADM

## 2024-07-17 RX ORDER — MECLIZINE HCL 12.5 MG/1
12.5 TABLET ORAL EVERY 8 HOURS PRN
Status: DISCONTINUED | OUTPATIENT
Start: 2024-07-17 | End: 2024-07-21 | Stop reason: HOSPADM

## 2024-07-17 RX ORDER — PRASUGREL 10 MG/1
5 TABLET, FILM COATED ORAL DAILY
Status: DISCONTINUED | OUTPATIENT
Start: 2024-07-17 | End: 2024-07-19

## 2024-07-17 RX ORDER — CINACALCET 30 MG/1
120 TABLET, FILM COATED ORAL 3 TIMES WEEKLY
Status: DISCONTINUED | OUTPATIENT
Start: 2024-07-17 | End: 2024-07-21 | Stop reason: HOSPADM

## 2024-07-17 RX ORDER — DIVALPROEX SODIUM 125 MG/1
250 CAPSULE, COATED PELLETS ORAL EVERY 12 HOURS SCHEDULED
Status: DISCONTINUED | OUTPATIENT
Start: 2024-07-17 | End: 2024-07-17

## 2024-07-17 RX ORDER — OXYCODONE HYDROCHLORIDE 5 MG/1
5 TABLET ORAL EVERY 6 HOURS PRN
Status: DISCONTINUED | OUTPATIENT
Start: 2024-07-17 | End: 2024-07-19

## 2024-07-17 RX ORDER — ACETAMINOPHEN 325 MG/1
650 TABLET ORAL EVERY 6 HOURS PRN
Status: DISCONTINUED | OUTPATIENT
Start: 2024-07-17 | End: 2024-07-21 | Stop reason: HOSPADM

## 2024-07-17 RX ORDER — METOPROLOL SUCCINATE 50 MG/1
50 TABLET, EXTENDED RELEASE ORAL 2 TIMES DAILY
Status: DISCONTINUED | OUTPATIENT
Start: 2024-07-17 | End: 2024-07-18

## 2024-07-17 RX ORDER — HYDROMORPHONE HCL/PF 1 MG/ML
0.3 SYRINGE (ML) INJECTION EVERY 4 HOURS PRN
Status: DISCONTINUED | OUTPATIENT
Start: 2024-07-17 | End: 2024-07-19

## 2024-07-17 RX ORDER — DIVALPROEX SODIUM 125 MG/1
125 CAPSULE, COATED PELLETS ORAL EVERY 12 HOURS SCHEDULED
Status: DISCONTINUED | OUTPATIENT
Start: 2024-07-17 | End: 2024-07-21 | Stop reason: HOSPADM

## 2024-07-17 RX ORDER — INSULIN LISPRO 100 [IU]/ML
1-6 INJECTION, SOLUTION INTRAVENOUS; SUBCUTANEOUS
Status: DISCONTINUED | OUTPATIENT
Start: 2024-07-17 | End: 2024-07-21 | Stop reason: HOSPADM

## 2024-07-17 RX ADMIN — CALCITRIOL CAPSULES 0.25 MCG 0.5 MCG: 0.25 CAPSULE ORAL at 13:31

## 2024-07-17 RX ADMIN — APIXABAN 5 MG: 5 TABLET, FILM COATED ORAL at 22:04

## 2024-07-17 RX ADMIN — CINACALCET 120 MG: 30 TABLET, FILM COATED ORAL at 13:31

## 2024-07-17 RX ADMIN — ATORVASTATIN CALCIUM 40 MG: 40 TABLET, FILM COATED ORAL at 18:02

## 2024-07-17 RX ADMIN — PRASUGREL 5 MG: 10 TABLET, FILM COATED ORAL at 14:06

## 2024-07-17 RX ADMIN — HYDROMORPHONE HYDROCHLORIDE 0.3 MG: 1 INJECTION, SOLUTION INTRAMUSCULAR; INTRAVENOUS; SUBCUTANEOUS at 19:02

## 2024-07-17 RX ADMIN — MECLIZINE 12.5 MG: 12.5 TABLET ORAL at 12:30

## 2024-07-17 RX ADMIN — DIVALPROEX SODIUM 125 MG: 125 CAPSULE ORAL at 22:04

## 2024-07-17 RX ADMIN — OXYCODONE HYDROCHLORIDE 5 MG: 5 TABLET ORAL at 18:04

## 2024-07-17 RX ADMIN — ACETAMINOPHEN 650 MG: 325 TABLET, FILM COATED ORAL at 14:06

## 2024-07-17 NOTE — ASSESSMENT & PLAN NOTE
Lab Results   Component Value Date    HGBA1C 5.9 (H) 07/14/2024     Diet controlled at home  On SSI coverage per [employer] while hospitalized  Carbohydrate restriction  Hypoglycemia protocol

## 2024-07-17 NOTE — ASSESSMENT & PLAN NOTE
Lab Results   Component Value Date    EGFR 10 07/16/2024    EGFR 5 07/15/2024    EGFR 6 07/14/2024    CREATININE 5.49 (H) 07/16/2024    CREATININE 8.75 (H) 07/15/2024    CREATININE 8.33 (H) 07/14/2024     ESRD secondary to diabetes, on HD M/W/F for 4 years, currently on Kidney transplant list  Creatinine today downtrend from 8.75 to 5.49  Baseline creatinine between 4-5  Serum sodium is 134 from 133 yesterday  Serum potassium is normalized at 5.0 from 6.0    Plan:  Nephrology has seen patient  Dialyzed yesterday, 3L of fluid removed  Hyperkalemia resolved after Lokelma 10 g x 1 and dialysis  Continue outpatient hemodialysis M/W/F at [facility]  Take Calcitriol 0.5 mcg 3 times a week  Take Cinacalcet 120 mg 3 times a week

## 2024-07-17 NOTE — ED PROVIDER NOTES
Emergency Department Airway Evaluation and Management Form    History  Obtained from: EMS  Patient has no known allergies.  Chief Complaint   Patient presents with    Fall     Pt states yesterday he went up one step, fell back, unknown head strike. Recent hospital admission and was started on heparin. Started on eliquis yesterday. (+) Weakness.     HPI    64-year-old on blood thinners fall, hit head, complaining of weakness    Airway intact, rest of eval and treatment per trauma team    Past Medical History:   Diagnosis Date    Cerebrovascular accident (CVA) due to thrombosis of left middle cerebral artery (HCC) 07/29/2018    Chronic kidney disease     Coronary artery disease     Diabetes mellitus (HCC)     not on meds    Dialysis patient (HCC)     M-W-F    Fistula     left upper arm for hemodialyis    GERD (gastroesophageal reflux disease)     History of coronary artery stent placement     x3    Hypercholesteremia     Hyperlipidemia     Hypertension     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    Limb alert care status     no BP/IV left arm    Neuropathy     Obesity     Osteomyelitis (HCC)     last assessed 11/4/16-per son not currently    PVC's (premature ventricular contractions)     sees [facility]    Stroke (HCC)     last weeof July 2018 [facility]    TIA (transient ischemic attack) 10/28/2018    Wears dentures     Wears glasses      Past Surgical History:   Procedure Laterality Date    ABDOMINAL SURGERY      CARDIAC CATHETERIZATION N/A 05/02/2022    Procedure: Cardiac Coronary Angiogram;  Surgeon: Sam Davis MD;  [facility];  Service: Cardiology    CARDIAC CATHETERIZATION N/A 05/02/2022    Procedure: Cardiac pci;  Surgeon: Sam Davis MD;  [facility];  Service: Cardiology    CARDIAC CATHETERIZATION  02/01/2023    Procedure: Cardiac catheterization;  Surgeon: Sam Davis MD;  [facility];  Service: Cardiology    CARDIAC CATHETERIZATION  "N/A 02/01/2023    Procedure: Cardiac pci;  Surgeon: Sam Davis MD;  [facility];  Service: Cardiology    CARDIAC CATHETERIZATION N/A 02/01/2023    Procedure: Cardiac Coronary Angiogram;  Surgeon: Sam Davis MD;  [facility];  Service: Cardiology    CARDIAC CATHETERIZATION N/A 02/01/2023    Procedure: Cardiac other-IVUS;  Surgeon: Sam Davis MD;  [facility];  Service: Cardiology    CHOLECYSTECTOMY      Percutaneous    COLONOSCOPY      CYSTOSCOPY      HEMODIALYSIS ADULT  1/22/2024    HEMODIALYSIS ADULT  1/24/2024    IR LOWER EXTREMITY ANGIOGRAM  9/29/2023    IR LOWER EXTREMITY ANGIOGRAM  2/26/2024    OTHER SURGICAL HISTORY      \"stimulator to control bowel movements\"    CT ESOPHAGOGASTRODUODENOSCOPY TRANSORAL DIAGNOSTIC N/A 09/27/2016    Procedure: ESOPHAGOGASTRODUODENOSCOPY (EGD);  Surgeon: Adele Rowe MD;  Location: [facility] GI LAB;  Service: Gastroenterology    CT LAPAROSCOPY SURG CHOLECYSTECTOMY N/A 02/29/2016    Procedure: LAPAROSCOPIC CHOLECYSTECTOMY ;  Surgeon: Cliff Roman DO;  Location: [facility];  Service: General    CT SLCTV CATHJ 3RD+ ORD SLCTV ABDL PEL/LXTR BRNCH Left 9/29/2023    Procedure: Left leg angiogram with intervention;  Surgeon: Michelle Galvan MD;  Location: [facility];  Service: Vascular    CT SLCTV CATHJ 3RD+ ORD SLCTV ABDL PEL/LXTR BRNCH Left 2/26/2024    Procedure: Left leg angiogram antegrade access, left popliteal angioplasty;  Surgeon: Michelle Galvan MD;  Location: [facility];  Service: Vascular    ROTATOR CUFF REPAIR Right     SPINAL CORD STIMULATOR IMPLANT      \"Medtronic interstim model # 3058- in lower back to control bowel movements-currently turned off-battery is dead\"    TOE AMPUTATION Right 10/28/2016    Procedure: 3RD TOE AMPUTATION ;  Surgeon: Anjel Salas DPM;  Location: [facility];  Service:      Family History   Problem Relation Age of Onset    Leukemia Mother     Liver disease Mother     Lung " cancer Mother         heavy smoker - 3 ppd    Heart disease Father     Liver disease Father     Diabetes type I Father     Multiple myeloma Sister     Breast cancer Sister     Urolithiasis Family     Alcohol abuse Neg Hx     Depression Neg Hx     Drug abuse Neg Hx     Substance Abuse Neg Hx     Mental illness Neg Hx      Social History     Tobacco Use    Smoking status: Never    Smokeless tobacco: Never   Vaping Use    Vaping status: Never Used   Substance Use Topics    Alcohol use: Never    Drug use: No     I have reviewed and agree with the history as documented.    Review of Systems    Physical Exam  BP (!) 103/48   Pulse 72   Temp 97.8 °F (36.6 °C) (Tympanic)   Resp 18   Wt 103 kg (227 lb 15.3 oz)   SpO2 91%   BMI 34.66 kg/m²     Physical Exam    ED Medications  Medications - No data to display    Intubation  Procedures    Notes      Final Diagnosis  Final diagnoses:   None       ED Provider  Electronically Signed by     Bethanie Quinones MD  07/17/24 0925

## 2024-07-17 NOTE — ASSESSMENT & PLAN NOTE
Presenting troponin of 421 (was 97 two weeks ago)  Possibly non-MI troponin elevation in the setting of fall yesterday with underlying CHF and ESRD on HD    Trend until peak  Cardiology to follow

## 2024-07-17 NOTE — ASSESSMENT & PLAN NOTE
Lab Results   Component Value Date    EGFR 6 07/17/2024    EGFR 10 07/16/2024    EGFR 5 07/15/2024    CREATININE 7.54 (H) 07/17/2024    CREATININE 5.49 (H) 07/16/2024    CREATININE 8.75 (H) 07/15/2024     - HD MWF, due today

## 2024-07-17 NOTE — ASSESSMENT & PLAN NOTE
Wt Readings from Last 3 Encounters:   07/16/24 93.3 kg (205 lb 11 oz)   07/05/24 98.4 kg (216 lb 14.9 oz)   07/02/24 94.8 kg (209 lb)       [place]VEF with [place] in [place] [facility] [place] of 35 to 40%.  Patient denied any shortness of breath, leg swelling or increased weight.  Following cardiologist outpatient and last visit in May.    Plan:  Continue home meds metoprolol, Entresto.  Strict I/O  Daily weights

## 2024-07-17 NOTE — DISCHARGE SUMMARY
[facility]  Discharge- Asad Galloway 1960, 64 y.o. male MRN: [id number]  Unit/Bed#: S -01 Encounter: [id number]  Primary Care Provider: Raj Auguste DO   Date and time admitted to hospital: 7/14/2024  4:38 PM    * Acute deep vein thrombosis (DVT) of right peroneal vein ([facility])  Assessment & Plan  Patient had right lower extremity for the past week, post leg injury, found to have a DVT on duplex ultrasound on 07/14  Patient started on heparin drip in ED, transitioned to Eliquis on 07/15  Today, edema improved to baseline.    Plan:  Discharge on Eliquis 5mg BID  Discontinue Aspirin  Continue Prasugrel 5mg daily  Ambulatory referral for Heme/Onc     Acute cough  Assessment & Plan  Patient presented to ED with 2 day history nonproductive cough associated with rhinorrhea, sore throat   Negative covid/flu/RSV  Patient cough improved, only slight cough present  Rhinorrhea improved, no SOB, denies n/v and chills   Likely viral in origin    Plan:  Patient switched from Mucinex to Robitussin   Continue supportive care at home    ESRD on dialysis ([facility])  Assessment & Plan  Lab Results   Component Value Date    EGFR 10 07/16/2024    EGFR 5 07/15/2024    EGFR 6 07/14/2024    CREATININE 5.49 (H) 07/16/2024    CREATININE 8.75 (H) 07/15/2024    CREATININE 8.33 (H) 07/14/2024     ESRD secondary to diabetes, on HD M/W/F for 4 years, currently on Kidney transplant list  Creatinine today downtrend from 8.75 to 5.49  Baseline creatinine between 4-5  Serum sodium is 134 from 133 yesterday  Serum potassium is normalized at 5.0 from 6.0    Plan:  Nephrology has seen patient  Dialyzed yesterday, 3L of fluid removed  Hyperkalemia resolved after Lokelma 10 g x 1 and dialysis  Continue outpatient hemodialysis M/W/F at [facility]  Take Calcitriol 0.5 mcg 3 times a week  Take Cinacalcet 120 mg 3 times a week    CHF (congestive heart failure) ([facility])  Assessment & Plan  Wt Readings from Last 3 Encounters:    07/16/24 93.3 kg (205 lb 11 oz)   07/05/24 98.4 kg (216 lb 14.9 oz)   07/02/24 94.8 kg (209 lb)       [place]VEF with echo in February 2023 at [facility] [place] showing EF of 35 to 40%.  Patient denied any shortness of breath, leg swelling or increased weight.  Following cardiologist outpatient and last visit in May.    Plan:  Continue home meds metoprolol, Entresto.  Strict I/O  Daily weights      Anemia  Assessment & Plan  Lab Results   Component Value Date    HGB 9.3 (L) 07/15/2024    HGB 9.8 (L) 07/14/2024    HGB 9.6 (L) 07/06/2024    HGB 10.6 (L) 07/05/2024        Baseline hemoglobin between 10-11  Chronic anemia in the setting of ESRD  Hgb down trending from 9.8 to 9.3 today  Denied any hematemesis, melena, bleeding    Plan  Monitor symptoms    Mixed hyperlipidemia  Assessment & Plan  Continue Lipitor 40mg qhs    Type 2 diabetes mellitus with chronic kidney disease on chronic dialysis, without long-term current use of insulin ([facility])  Assessment & Plan  Lab Results   Component Value Date    HGBA1C 5.9 (H) 07/14/2024       Recent Labs     07/15/24  1515 07/15/24  2133 07/16/24  0718 07/16/24  1121   POCGLU 97 177* 104 118         Blood Sugar Average: Last 72 hrs:  (P) [id number] home medication regimen, controlled with diet  HbgA1c  Regular diet  PCOT glucose checks before meals and bedtime  If blood sugars persistently >200, order sliding scale insulin  Goal -180 while admitted, adjusting insulin regimen as appropriate  Monitor for hypoglycemia and treat per protocol        Medical Problems       Resolved Problems  Date Reviewed: 7/17/2024   None       Discharging Resident: Christine Silva MD  Discharging Attending: No att. providers found  PCP: Raj Auguste DO  Admission Date:   Admission Orders (From admission, onward)       Ordered        07/14/24 1919  INPATIENT ADMISSION  Once                          Discharge Date: 07/17/24    Consultations During Hospital Stay:  Nephrology  PT OT  Wound  care    Procedures Performed:   Hemodialysis  Venous Duplex lower limb unilateral ultrasound  X-ray of the chest 2 views    Significant Findings / Test Results:   Venous Duplex ultrasound: Showed evidence of acute versus subacute occlusive deep vein thrombosis within one of the paired peroneal veins of the proximal calf  BMP 7/14 showed creatinine of 8.33  BMP 07/15 showed Cr of 8.75    Incidental Findings:   None    Test Results Pending at Discharge (will require follow up):  None     Outpatient Tests Requested:  None    Complications: None    Reason for Admission: Acute right peroneal DVT    Hospital Course:   Asad Galloway is a 64 y.o. male patient who originally presented to the hospital on 7/14/2024 due to non-productive cough, sneezing, sore throat, and fatigue. He also presented with a swollen right leg that has been increasing in size for the past week, post fall for which he reported to the ED for. Patient received a venous duplex of lower limb which showed a DVT within one of the paired peroneal veins at the proximal calf. Patient was admitted for DVT management. Patient started on heparin drip in ED and switched to Eliquis on discharge. Patient plan for heme/onc consult outpatient for further DVT management/workup. CXR in ED showed increased bilateral infiltrates versus pulmonary congestion. Patient was put on Mucinex 1200 mg BID and received routine vital signs check ins. Patient switched to Robitussin on discharge. Patient received inpatient hemodialysis on 07/15.     Please see above list of diagnoses and related plan for additional information.     Condition at Discharge: good    Discharge Day Visit / Exam:   Subjective:  Patient looked good overall. Improved non productive cough and absent swelling. Denied SOB, rhinorrhea and sore throat.   Vitals: Blood Pressure: 94/76 (07/16/24 1150)  Pulse: 84 (07/16/24 0716)  Temperature: 97.9 °F (36.6 °C) (07/16/24 0716)  Temp Source: Oral (07/14/24  "1645)  Respirations: 15 (07/16/24 0716)  Height: 5' 8\" (172.7 cm) (07/14/24 2038)  Weight - Scale: 93.3 kg (205 lb 11 oz) (without oxygen tank on bed) (07/16/24 0558)  SpO2: 99 % (07/16/24 0716)    Physical Exam:   Constitutional:       Appearance: Normal appearance.   HENT:      Head: Normocephalic and atraumatic.      Right Ear: External ear normal.      Left Ear: External ear normal.      Nose: Nose normal.      Mouth/Throat:      Mouth: Mucous membranes are moist.      Pharynx: Oropharynx is clear.   Eyes:      Conjunctiva/sclera: Conjunctivae normal.   Cardiovascular:      Rate and Rhythm: Normal rate and regular rhythm.      Pulses: Normal pulses.      Heart sounds: Normal heart sounds.   Pulmonary:      Effort: Pulmonary effort is normal.      Breath sounds: Normal breath sounds.   Abdominal:      General: Bowel sounds are normal.   Musculoskeletal:         General: No swelling. Normal range of motion.      Right lower leg: No edema.      Left lower leg: No edema.   Skin:     General: Skin is warm and dry.   Neurological:      Mental Status: He is alert and oriented to person, place, and time.   Psychiatric:         Mood and Affect: Mood normal.         Behavior: Behavior normal.         Thought Content: Thought content normal.    Discussion with Family: Updated  (wife) at bedside.    Discharge instructions/Information to patient and family:   See after visit summary for information provided to patient and family.      Provisions for Follow-Up Care:  See after visit summary for information related to follow-up care and any pertinent home health orders.      Mobility at time of Discharge:   Basic Mobility Inpatient Raw Score: 14  -HLM Goal: 4: Move to chair/commode  JH-HLM Achieved: 7: Walk 25 feet or more  HLM Goal achieved. Continue to encourage appropriate mobility.     Disposition:   Home    Planned Readmission: No    Discharge Medications:  See after visit summary for reconciled discharge " medications provided to patient and/or family.      **Please Note: This note may have been constructed using a voice recognition system**

## 2024-07-17 NOTE — ASSESSMENT & PLAN NOTE
Last EF of 40% in December 2023 with apical akinesis and multi-regional LV hypokinesis, mild-moderate AR, moderate -severe AS, moderate MR, and mild TR -> in light of primary/presenting issue, check updated echocardiogram   Fluid management via dialysis  Continue Toprol-XL for beta-blockade - c/w Entresto  Sodium/fluid restriction  Monitor/replete electrolyte deficiencies if present

## 2024-07-17 NOTE — TELEPHONE ENCOUNTER
Sam is calling to reschedule his dad's appointment from today because he had to take his dad to the ER.  Asad is now scheduled on 8/15/24 and Sam is looking for a sooner appointment, I did added his dad to the wait list.  Sam states that his dad has dialysis on Monday, Wednesday and Friday.

## 2024-07-17 NOTE — PROCEDURES
POC FAST US    Date/Time: 7/17/2024 10:31 AM    Performed by: Koffi Burns PA-C  Authorized by: Koffi Burns PA-C    Patient location:  Trauma  Procedure details:     Exam Type:  Diagnostic    Indications: blunt abdominal trauma and blunt chest trauma      Assess for:  Intra-abdominal fluid, pericardial effusion and pneumothorax    Technique: extended FAST      Views obtained:  Heart - Pericardial sac, LUQ - Splenorenal space, Suprapubic - Pouch of Jacinto, RUQ - Hodge's Pouch, Left thorax and Right thorax    Image quality: diagnostic      Image availability:  Images available in PACS and video obtained  FAST Findings:     RUQ (Hepatorenal) free fluid: absent      LUQ (Splenorenal) free fluid: absent      Suprapubic free fluid: absent      Cardiac wall motion: identified      Pericardial effusion: absent    extended FAST (Pulmonary) findings:     Left lung sliding: Present      Right lung sliding: Present    Interpretation:     Impressions: negative

## 2024-07-17 NOTE — ASSESSMENT & PLAN NOTE
-Acute DVT within right peroneal veins at the proximal calf region  -D/c from hospital for this yesterday  -D/c on Eliquis

## 2024-07-17 NOTE — ASSESSMENT & PLAN NOTE
Patient ID: Kathy is a 21 year old female.  MRN: [id number]  Chief Complaint   Patient presents with   • Vaginal Discharge     HISTORY OF PRESENT ILLNESS    Vaginal discharge   Patient took one time dose of fluconazole  Symptoms may have gone away and returned   Patient took monostat one time - 3 weeks ago   Consistency is lighter   Cranberry juice for urinary health   Sexually active - but has refrained for for 2 weeks since her symptoms   No pain   No frequency or urgency     Recent URI   Around ~2/20  Swollen gland on right  Has gotten smaller   Was sick for 5 days   Did covid test which was negative   Wanted to make sure this was normal   No night sweats or weight loss    ALLERGIES  ALLERGIES:  No Known Allergies    MEDICAL HISTORY  Past Medical History:   Diagnosis Date   • Excessive or frequent menstruation 8/10/2018   • Menorrhagia with regular cycle 8/9/2019     Patient Active Problem List   Diagnosis   • Pes planovalgus   • Annual physical exam   • Encounter for contraceptive management   • Impacted cerumen of right ear   • Vaginal discharge   • Lymph node symptom     SURGICAL HISTORY  No past surgical history on file.  FAMILY HISTORY  Family History   Problem Relation Age of Onset   • Migraine Mother    • Patient is unaware of any medical problems Father    • Patient is unaware of any medical problems Sister    • Dementia/Alzheimers Paternal Grandmother    • Stroke Paternal Grandfather    • Hypertension Paternal Grandfather      SOCIAL HISTORY  Social History     Socioeconomic History   • Marital status: Single     Spouse name: Not on file   • Number of children: Not on file   • Years of education: Not on file   • Highest education level: Not on file   Occupational History   • Not on file   Tobacco Use   • Smoking status: Never   • Smokeless tobacco: Never   Vaping Use   • Vaping Use: never used   Substance and Sexual Activity   • Alcohol use: Never   • Drug use: Yes     Types: Marijuana   • Sexual  Patient had right lower extremity for the past week, post leg injury, found to have a DVT on duplex ultrasound on 07/14  Patient started on heparin drip in ED, transitioned to Eliquis on 07/15  Today, edema improved to baseline.    Plan:  Discharge on Eliquis 5mg BID  Discontinue Aspirin  Continue Prasugrel 5mg daily  Ambulatory referral for Heme/Onc    activity: Yes     Partners: Male     Birth control/protection: Spermicide   Other Topics Concern   • Not on file   Social History Narrative   • Not on file     Social Determinants of Health     Financial Resource Strain: Not on file   Food Insecurity: Not on file   Transportation Needs: Not on file   Physical Activity: Not on file   Stress: Not on file   Social Connections: Not on file   Intimate Partner Violence: Not on file     CURRENT MEDICATIONS  Current Outpatient Medications   Medication Sig Dispense Refill   • norgestimate-ethinyl estradiol (Estarylla) 0.25-35 MG-MCG per tablet Take 1 tablet by mouth daily. 28 tablet 1   • norgestimate-ethinyl estradiol (Estarylla) 0.25-35 MG-MCG per tablet Take 1 tablet by mouth daily. 90 tablet 3     No current facility-administered medications for this visit.       OBJECTIVE  Vitals:    03/13/23 1130   BP: 104/64   BP Location: LUE - Left upper extremity   Patient Position: Sitting   Cuff Size: Regular   Pulse: 99   Resp: 18   Weight: 56.7 kg (125 lb)   Height: 5' 1\" (1.549 m)   LMP: 03/03/2023     Physical Exam  Vitals reviewed. Exam conducted with a chaperone present (BENY Graves).   Constitutional:       General: She is not in acute distress.     Appearance: Normal appearance. She is not ill-appearing.   HENT:      Head: Normocephalic and atraumatic.      Right Ear: External ear normal.      Left Ear: External ear normal.      Nose: Nose normal.      Mouth/Throat:      Comments: Wearing mask     Neck: Normal range of motion.   Eyes:      General:         Right eye: No discharge.         Left eye: No discharge.      Conjunctiva/sclera: Conjunctivae normal.   Neck:      Comments: Small circumcised mobile nodule below right ear appreciated   Pulmonary:      Effort: No respiratory distress.   Genitourinary:     Pubic Area: No rash.       Vagina: Vaginal discharge present.      Cervix: Discharge present. No friability or erythema.   Musculoskeletal:         General: Normal  range of motion.   Skin:     General: Skin is warm.   Neurological:      General: No focal deficit present.      Mental Status: She is alert and oriented to person, place, and time.   Psychiatric:         Mood and Affect: Mood normal.         Behavior: Behavior normal.          ASSESSMENT AND PLAN  Problem List Items Addressed This Visit        Genitourinary and Reproductive    Vaginal discharge - Primary     Discharge seen on speculum examination   swabone performed  Plan pending results          Relevant Orders    SWABONE VAGINITIS PANEL (Completed)       Symptoms and Signs    Lymph node symptom     Informed to monitor and if enlarging to return sooner or not improved in ~6 months to return for evaluation   Consider cbc and u/s if not improved or enlarged in future        Other Visit Diagnoses     Vaginal candida               Health Maintenance Summary     Hepatitis C Screening (Once)  Overdue - never done    COVID-19 Vaccine (4 - Booster for Moderna series)  Overdue since 3/19/2022    DTaP/Tdap/Td Vaccine (7 - Td or Tdap)  Next due on 3/16/2023    Depression Screening (Yearly)  Next due on 12/9/2023    Cervical Cancer Screening - <30 3 year (Every 3 Years)  Next due on 12/9/2025    Pneumococcal Vaccine 0-64   Aged Out    Hepatitis B Vaccine   Completed    HPV Vaccine   Completed    Varicella Vaccine   Completed    Meningococcal Vaccine   Completed    Influenza Vaccine   Completed          Schedule follow up: Return if symptoms worsen or fail to improve.    Corbin Morse D.O.  Family Medicine Resident, PGY-3

## 2024-07-17 NOTE — ED NOTES
Patient's C-Collar removed at this time, cleared by trauma PADEVONTE Mortensen RN  07/17/24 3564

## 2024-07-17 NOTE — ED NOTES
Patient requires heavy assistance from 2-3 people to stand and pivot to use BSC. Assisted patient to clean up after liquid BM and to get back into bed.      Marine Pineda RN  07/17/24 [id number]

## 2024-07-17 NOTE — H&P
[facility]  H&P  Name: Asad Galloway 64 y.o. male I MRN: [id number]  Unit/Bed#: ED-30 I Date of Admission: 7/17/2024   Date of Service: 7/17/2024 I Hospital Day: 0      Assessment & Plan   Acute deep vein thrombosis (DVT) of right lower extremity (HCC)  Assessment & Plan  -Acute DVT within right peroneal veins at the proximal calf region  -D/c from hospital for this yesterday  -D/c on Eliquis    Fall  Assessment & Plan  -Syncopal fall, fell backwards hitting head steps yesterday shortly after being discharged from hospital; fell down 5 steps  -On Eliquis, recently started  -LOC+  -SBP 80s during transport, approx 400cc NS given  -GCS 14, disoriented to time  -Complains of diffuse pain and cramping in right leg  - CT Head, c-spine, and XR RLE negative for acute traumatic injuries  - trauma will continue to follow for tertiary trauma exam 7/18    Elevated troponin  Assessment & Plan  - admit to internal medicine for elevated troponin  - telemetry    ESRD on dialysis (HCC)  Assessment & Plan  Lab Results   Component Value Date    EGFR 6 07/17/2024    EGFR 10 07/16/2024    EGFR 5 07/15/2024    CREATININE 7.54 (H) 07/17/2024    CREATININE 5.49 (H) 07/16/2024    CREATININE 8.75 (H) 07/15/2024     - HD MWF, due today           Cervical Collar Clearance:    The patient had a CT scan of the cervical spine demonstrating no acute injury. On exam, the patient had no midline point tenderness or paresthesias/numbness/weakness in the extremities. The patient had full range of motion (was then able to flex, extend, and rotate head laterally) without pain. There were no distracting injuries and the patient was not intoxicated.      The patient's cervical spine was cleared radiologically and clinically. Cervical collar removed at this time.     Koffi Burns PA-C  7/17/2024 1000         Trauma Alert: Level B   Model of Arrival: Ambulance    Trauma Team: Attending Efren and PAU Burns  Consultants:      None     History of Present Illness     Chief Complaint: Syncopal fall with head strike on Eliquis  Mechanism:Fall     HPI:    Asad Galloway is a 64 y.o. male with PMHx ESRD on dialysis, recent DVT on Eliquis, who presents with syncopal fall on AC which happened yesterday shortly after getting discharged from hospital.  Patient reportedly fell backwards going up steps and hit the back of his head to the ground. He reports LOC+. At this point he did not seek medical attention.  According to EMS report, this morning the patient was unable to get off of the toilet and EMS was called.  During transport, patient was noted to have SBP 80s, approximately 400 cc NS was given during transport.  On presentation to the trauma bay, patient had SBP low 100s.  He appeared confused with GCS 14.  Unclear if this is his baseline.  Patient complains of diffuse pain, worse in his back as well as his right lower extremity, appears to be muscle cramp in the location.  Patient appears agitated.  Patient was initially in the hospital for DVT and was discharged yesterday after bridging from heparin to Eliquis.    Review of Systems   Constitutional:  Negative for chills and fever.   HENT: Negative.     Respiratory:  Negative for cough and shortness of breath.    Cardiovascular:  Negative for chest pain and palpitations.   Gastrointestinal:  Negative for abdominal pain, nausea and vomiting.   Musculoskeletal:  Positive for back pain.        Right leg pain   Psychiatric/Behavioral:  Positive for agitation.      12-point, complete review of systems was reviewed and negative except as stated above.     Historical Information     Past Medical History:   Diagnosis Date    Cerebrovascular accident (CVA) due to thrombosis of left middle cerebral artery (HCC) 07/29/2018    Chronic kidney disease     Coronary artery disease     Diabetes mellitus (HCC)     not on meds    Dialysis patient (HCC)     M-W-F    Fistula     left upper arm for  "hemodialyis    GERD (gastroesophageal reflux disease)     History of coronary artery stent placement     x3    Hypercholesteremia     Hyperlipidemia     Hypertension     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    Limb alert care status     no BP/IV left arm    Neuropathy     Obesity     Osteomyelitis (HCC)     last assessed 11/4/16-per son not currently    PVC's (premature ventricular contractions)     sees [facility]    Stroke (HCC)     last weeof July 2018 [facility]    TIA (transient ischemic attack) 10/28/2018    Wears dentures     Wears glasses      Past Surgical History:   Procedure Laterality Date    ABDOMINAL SURGERY      CARDIAC CATHETERIZATION N/A 05/02/2022    Procedure: Cardiac Coronary Angiogram;  Surgeon: Sam Davis MD;  [facility];  Service: Cardiology    CARDIAC CATHETERIZATION N/A 05/02/2022    Procedure: Cardiac pci;  Surgeon: Sam Davis MD;  [facility];  Service: Cardiology    CARDIAC CATHETERIZATION  02/01/2023    Procedure: Cardiac catheterization;  Surgeon: Sam Davis MD;  [facility];  Service: Cardiology    CARDIAC CATHETERIZATION N/A 02/01/2023    Procedure: Cardiac pci;  Surgeon: Sam Davis MD;  [facility];  Service: Cardiology    CARDIAC CATHETERIZATION N/A 02/01/2023    Procedure: Cardiac Coronary Angiogram;  Surgeon: Sam Davis MD;  [facility];  Service: Cardiology    CARDIAC CATHETERIZATION N/A 02/01/2023    Procedure: Cardiac other-IVUS;  Surgeon: Sam Davis MD;  [facility];  Service: Cardiology    CHOLECYSTECTOMY      Percutaneous    COLONOSCOPY      CYSTOSCOPY      HEMODIALYSIS ADULT  1/22/2024    HEMODIALYSIS ADULT  1/24/2024    IR LOWER EXTREMITY ANGIOGRAM  9/29/2023    IR LOWER EXTREMITY ANGIOGRAM  2/26/2024    OTHER SURGICAL HISTORY      \"stimulator to control bowel movements\"    KS ESOPHAGOGASTRODUODENOSCOPY TRANSORAL DIAGNOSTIC N/A " "09/27/2016    Procedure: ESOPHAGOGASTRODUODENOSCOPY (EGD);  Surgeon: Adele Rowe MD;  Location: [facility];  Service: Gastroenterology    DE LAPAROSCOPY SURG CHOLECYSTECTOMY N/A 02/29/2016    Procedure: LAPAROSCOPIC CHOLECYSTECTOMY ;  Surgeon: Cliff Roman DO;  Location: [facility];  Service: General    DE SLCTV CATHJ 3RD+ ORD SLCTV ABDL PEL/LXTR BRNCH Left 9/29/2023    Procedure: Left leg angiogram with intervention;  Surgeon: Michelle Galvan MD;  Location: [facility];  Service: Vascular    DE SLCTV CATHJ 3RD+ ORD SLCTV ABDL PEL/LXTR BRNCH Left 2/26/2024    Procedure: Left leg angiogram antegrade access, left popliteal angioplasty;  Surgeon: Michelle Galvan MD;  Location: [facility];  Service: Vascular    ROTATOR CUFF REPAIR Right     SPINAL CORD STIMULATOR IMPLANT      \"Medtronic interstim model # 3058- in lower back to control bowel movements-currently turned off-battery is dead\"    TOE AMPUTATION Right 10/28/2016    Procedure: 3RD TOE AMPUTATION ;  Surgeon: Anjel Salas DPM;  Location[facility];  Service:         Social History     Tobacco Use    Smoking status: Never    Smokeless tobacco: Never   Vaping Use    Vaping status: Never Used   Substance Use Topics    Alcohol use: Never    Drug use: No     Immunization History   Administered Date(s) Administered    COVID-19 MODERNA VACC 0.5 ML IM 11/08/2021    COVID-19 Moderna mRNA Vaccine 12 Yr+ 50 mcg/0.5 mL (Spikevax) 11/01/2023    COVID-19 PFIZER VACCINE 0.3 ML IM 03/23/2021, 04/21/2021    COVID-19 Pfizer Vac BIVALENT Mert-sucrose 12 Yr+ IM 12/03/2022    Hep B, adult 05/19/2021, 06/16/2021, 07/21/2021, 11/26/2021    Hepatitis B Vaccine (Recombinant), Cpg Adjuvanted 06/16/2021, 07/21/2021, 11/26/2021    INFLUENZA 10/04/2021, 11/02/2021, 10/01/2022    Influenza Quadrivalent Preservative Free 3 years and older IM 10/04/2021, 10/01/2022    Influenza Quadrivalent Recombinant Preservative Free IM 10/16/2023    Influenza, recombinant, " quadrivalent,injectable, preservative free 11/02/2021    Pneumococcal Polysaccharide PPV23 09/21/2018    Tdap 10/24/2016, 01/30/2022     Last Tetanus: 2022  Family History: Non-contributory     Meds/Allergies   all current active meds have been reviewed  Allergies have not been reviewed;  No Known Allergies    Objective   Initial Vitals:   Temperature: 97.8 °F (36.6 °C) (07/17/24 0911)  Pulse: 72 (07/17/24 0915)  Respirations: 18 (07/17/24 0915)  Blood Pressure: (!) 103/48 (07/17/24 0915)    Primary Survey:   Airway:        Status: patent;        Pre-hospital Interventions: none        Hospital Interventions: none  Breathing:        Pre-hospital Interventions: none       Effort: normal       Right breath sounds: normal       Left breath sounds: normal  Circulation:        Rhythm: regular       Rate: regular   Right Pulses Left Pulses    R radial: 2+  R femoral: 2+  R pedal: 2+  R carotid: 2+  R popliteal: 2+ L radial: 2+  L femoral: 2+  L pedal: 2+  L carotid: 2+  L popliteal: 2+   Disability:        GCS: Eye: 4; Verbal: 5 Motor: 6 Total: 15       Right Pupil: 3 mm;  round;  reactive         Left Pupil:  3 mm;  round;  reactive      R Motor Strength L Motor Strength    R : 5/5  R dorsiflex: 5/5  R plantarflex: 5/5 L : 5/5  L dorsiflex: 5/5  L plantarflex: 5/5        Sensory:  No sensory deficit  Exposure:       Completed: Yes      Secondary Survey:  Physical Exam  Vitals and nursing note reviewed.   Constitutional:       Appearance: Normal appearance.   HENT:      Head: Normocephalic and atraumatic.      Right Ear: Tympanic membrane, ear canal and external ear normal.      Left Ear: Tympanic membrane, ear canal and external ear normal.      Nose: Nose normal.      Mouth/Throat:      Mouth: Mucous membranes are moist.      Pharynx: Oropharynx is clear.   Eyes:      Extraocular Movements: Extraocular movements intact.      Conjunctiva/sclera: Conjunctivae normal.      Pupils: Pupils are equal, round, and  reactive to light.   Cardiovascular:      Rate and Rhythm: Normal rate and regular rhythm.      Pulses: Normal pulses.      Heart sounds: Normal heart sounds.   Pulmonary:      Effort: Pulmonary effort is normal.      Breath sounds: Normal breath sounds.   Abdominal:      General: Abdomen is flat. Bowel sounds are normal.      Palpations: Abdomen is soft.      Tenderness: There is no abdominal tenderness.   Musculoskeletal:      Comments: No CTL spine midline tenderness, stepoffs, or deformities.   Skin:     General: Skin is warm and dry.      Capillary Refill: Capillary refill takes less than 2 seconds.   Neurological:      General: No focal deficit present.      Mental Status: He is alert.      Comments: GCS 14, patient appears confused. No apparent lateralizing deficits noted.   Psychiatric:         Mood and Affect: Mood normal.         Behavior: Behavior normal.         Thought Content: Thought content normal.         Invasive Devices       Peripheral Intravenous Line  Duration             Peripheral IV 07/17/24 Right Antecubital <1 day              Line  Duration             Hemodialysis Access Left Upper arm -- days                  Lab Results: I have personally reviewed all pertinent laboratory/test results from 07/17/24, including the preceding 24 hours.  Recent Labs     07/14/24  1901 07/15/24  0137 07/15/24  0623 07/15/24  1009 07/16/24  0054 07/16/24  0445 07/17/24  0926 07/17/24  0928 07/17/24  1211   WBC 6.20   < >  --   --   --   --  5.23  --   --    HGB 9.8*   < >  --   --   --   --  8.7* 8.5*  --    HCT 31.6*   < >  --   --   --   --  27.7* 25*  --    *   < >  --   --   --   --  119*  --   --    SODIUM 135  --  133*  --   --    < > 133*  --   --    K 4.9  --  6.0*  --   --    < > 5.0  --   --    CL 94*  --  96  --   --    < > 96  --   --    CO2 28  --  23  --   --    < > 27 30  --    BUN 65*  --  70*  --   --    < > 50*  --   --    CREATININE 8.33*  --  8.75*  --   --    < > 7.54*  --   --     GLUC 114  --  101  --   --    < > 97  --   --    CAIONIZED  --   --   --   --   --   --   --  1.09*  --    MG  --   --  2.1  --   --   --   --   --   --    PHOS  --   --  5.9*  --   --   --   --   --   --    AST 5*  --  6*  --   --   --   --   --   --    ALT 8  --  6*  --   --   --   --   --   --    ALB 3.8  --  3.6  --   --   --   --   --   --    TBILI 0.57  --  0.62  --   --   --   --   --   --    ALKPHOS 115*  --  99  --   --   --   --   --   --    PTT 31   < >  --    < > 99*  --   --   --   --    INR 1.01  --   --   --   --   --   --   --   --    HSTNI0  --   --   --   --   --   --  421*  --   --    HSTNI2  --   --   --   --   --   --   --   --  414*    < > = values in this interval not displayed.       Imaging Results: I have personally reviewed pertinent images saved in PACS. CT scan findings (and other pertinent positive findings on images) were discussed with radiology. My interpretation of the images/reports are as follows:  Chest Xray(s): negative for acute findings   FAST exam(s): negative for acute findings   CT Scan(s): negative for acute findings   Additional Xray(s): N/A     Other Studies: N/A    Code Status: Level 1 - Full Code  Advance Directive and Living Will:      Power of :    POLST:

## 2024-07-17 NOTE — H&P
[facility]  H&P  Name: Asad Galloway 64 y.o. male I MRN: [id number]  Unit/Bed#: ED-30 I Date of Admission: 7/17/2024   Date of Service: 7/17/2024 I Hospital Day: 0      Assessment & Plan:    * Syncope and collapse  Assessment & Plan  After returning home from being discharged from the hospital yesterday, sustained a syncopal episode while walking up steps with subsequent fall backwards and reported head strike-> lifted up and assisted by son to his room at the time  Presented to the ED today after inability to lift himself off a toilet seat with weakness  CT of head and cervical spine unremarkable for acute injury/abnormalities - remainder of traumatic radiology also unremarkable for acute injuries  Check orthostatics  Check thyroid panel  Consider vasovagal etiology ??  In the setting of CHF, check updated cardiogram  Will appreciate cardiology input    Acute deep vein thrombosis (DVT) of right lower extremity (HCC)  Assessment & Plan  Recently diagnosed on hospitalization earlier this month (just discharged yesterday)  Continue Eliquis    ESRD on dialysis (HCC)  Assessment & Plan  Continue routine hemodialysis per nephrology (typically M/W/F)  On Sensipar/Rocaltrol    Elevated troponin  Assessment & Plan  Presenting troponin of 421 (was 97 two weeks ago)  Possibly non-MI troponin elevation in the setting of fall yesterday with underlying CHF and ESRD on HD    Trend until peak  Cardiology to follow    Hyperlipidemia  Assessment & Plan  Continue statin    Chronic systolic heart failure (HCC)  Assessment & Plan  Last EF of 40% in December 2023 with apical akinesis and multi-regional LV hypokinesis, mild-moderate AR, moderate -severe AS, moderate MR, and mild TR -> in light of primary/presenting issue, check updated echocardiogram   Fluid management via dialysis  Continue Toprol-XL for beta-blockade  Sodium/fluid restriction  Monitor/replete electrolyte deficiencies if present    QT  prolongation  Assessment & Plan  QT of 486 on EKG (QTc of 516)  Limit/avoid QT prolonging agents as possible  Cardiology to follow    Hyponatremia  Assessment & Plan  In the setting of ESRD on HD  Serum sodium currently at 133    Type 2 diabetes mellitus (HCC)  Assessment & Plan  Lab Results   Component Value Date    HGBA1C 5.9 (H) 07/14/2024     Diet controlled at home  On SSI coverage per [employer] while hospitalized  Carbohydrate restriction  Hypoglycemia protocol    Bicytopenia  Assessment & Plan  Chronic anemia/thrombocytopenia noted  Monitor CBC    Obesity (BMI 30.0-34.9)  Assessment & Plan  BMI of 34.66  Lifestyle/diet modifications    History of stroke in adulthood  Assessment & Plan  C/w Effient/statin    PAD (peripheral artery disease) ([facility])  Assessment & Plan  Follows outpatient podiatry  Status post right toe amputation  S/p arteriogram earlier this year with a multitude of LLE vascular intervention  Continue Effient/statin      DVT Prophylaxis:  Eliquis    Code Status: Full code     Discussion with: Patient at bedside    Anticipated Length of Stay:  Patient will be admitted on an Observation basis with an anticipated length of stay of less than 2 midnights.   Justification for Hospital Stay: Syncopal episode with fall down steps and headstrike on anticoagulation requiring observation.    Total Time for Visit, including Counseling / Coordination of Care: 75 minutes. More than 50% of total time spent on counseling and coordination of care, on one or more of the following: performing physical exam; counseling and coordination of care; obtaining or reviewing history; documenting in the medical record; reviewing/ordering tests, medications or procedures; communicating with other healthcare professionals and discussing with patient's family/caregivers.      Chief Complaint:  Fall down steps and headstrike yesterday with increased weakness and right leg pain today      History of Present Illness:    Asad JAMES  Laverne is a 64 y.o. male who presents with complaints of increased weakness and pain after being discharged from the hospital yesterday where he was found to have an acute right lower extremity DVT.  After arrival home, while attempting to go up a flight of steps, he felt a bit lightheaded and fell backwards, with recollection of a head strike.  He also reported some left-sided leg pain more prominently at the thigh and calf.  He was lifted up by his son and walked over to a bedroom on the first floor.  This morning, shortly after waking up, he was in the bathroom and after sitting on the toilet, stated he had increased difficulty being able to lift himself up due to weakness and pain.  For this reason, his family called the ambulance, who proceeded to bring him to the hospital for further evaluation.      Review of Systems:    Review of Systems - A thorough 12 point review systems was conducted.  Pertinent positives and negatives are mentioned in the history of present illness.      Past Medical and Surgical History:     Past Medical History:   Diagnosis Date    Cerebrovascular accident (CVA) due to thrombosis of left middle cerebral artery (HCC) 07/29/2018    Chronic kidney disease     Coronary artery disease     Diabetes mellitus (HCC)     not on meds    Dialysis patient (HCC)     M-W-F    Fistula     left upper arm for hemodialyis    GERD (gastroesophageal reflux disease)     History of coronary artery stent placement     x3    Hypercholesteremia     Hyperlipidemia     Hypertension     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    Limb alert care status     no BP/IV left arm    Neuropathy     Obesity     Osteomyelitis (HCC)     last assessed 11/4/16-per son not currently    PVC's (premature ventricular contractions)     sees  Cardio    Stroke (HCC)     last weeof July 2018 [facility]    TIA (transient ischemic attack) 10/28/2018    Wears dentures     Wears glasses        Past Surgical History:  "  Procedure Laterality Date    ABDOMINAL SURGERY      CARDIAC CATHETERIZATION N/A 05/02/2022    Procedure: Cardiac Coronary Angiogram;  Surgeon: Sam Davis MD;  Location: [facility] CARD[facility]AC CATH LAB;  Service: Cardiology    CARDIAC CATHETERIZATION N/A 05/02/2022    Procedure: Cardiac pci;  Surgeon: Sam Davis MD;  [facility]: [facility] CARDIAC [facility];  Service: Cardiology    CARDIAC CATHETERIZATION  02/01/2023    Procedure: Cardiac catheterization;  Surgeon: Sam Davis MD;  Location: [facility] CARDIAC CATH LAB;  Service: Cardiology    CARDIAC CATHETERIZATION N/A 02/01/2023    Procedure: Cardiac pci;  Surgeon: Sam Davis MD;  Location: [facility] CARDIAC CATH [facility];  Service: Cardiology    CARDIAC CATHETERIZATION N/A 02/01/2023    Procedure: Cardiac Coronary Angiogram;  Surgeon: Sam Davis MD;  Location: [facility] CARDIAC CATH LAB;  Service: Cardiology    CARDIAC CATHETERIZATION N/A 02/01/2023    Procedure: Cardiac other-IVUS;  Surgeon: Sam Davis MD;  Location: [facility] CARDIAC CATH LAB;  Service: Cardiology    CHOLECYSTECTOMY      Percutaneous    COLONOSCOPY      CYSTOSCOPY      HEMODIALYSIS ADULT  1/22/2024    HEMODIALYSIS ADULT  1/24/2024    IR LOWER EXTREMITY ANGIOGRAM  9/29/2023    IR LOWER EXTREMITY ANGIOGRAM  2/26/2024    OTHER SURGICAL HISTORY      \"stimulator to control bowel movements\"    DC ESOPHAGOGASTRODUODENOSCOPY TRANSORAL DIAGNOSTIC N/A 09/27/2016    Procedure: ESOPHAGOGASTRODUODENOSCOPY (EGD);  Surgeon: Adele Rowe MD;  Location: [facility] GI LAB;  Service: Gastroenterology    DC LAPAROSCOPY SURG CHOLECYSTECTOMY N/A 02/29/2016    Procedure: LAPAROSCOPIC CHOLECYSTECTOMY ;  Surgeon: Cliff Roman DO;  Location: [facility] Main OR;  Service: General    DC SLCTV CATHJ 3RD+ ORD SLCTV ABDL PEL/LXTR BRNC Left 9/29/2023    Procedure: Left leg angiogram with intervention;  Surgeon: Michelle Galvan MD;  Location: [facility] MAIN OR;  Service: Vascular    DC SLCTV CATHJ 3RD+ ORD SLCTV ABDL PEL/LXTR BRNCH Left 2/26/2024    Procedure: Left leg angiogram " "antegrade access, left popliteal angioplasty;  Surgeon: Michelle Galvan MD;  Location: [facility] MAIN OR;  Service: Vascular    ROTATOR CUFF REPAIR Right     SPINAL CORD STIMULATOR IMPLANT      \"Medtronic interstim model # 3058- in lower back to control bowel movements-currently turned off-battery is dead\"    TOE AMPUTATION Right 10/28/2016    Procedure: 3RD TOE AMPUTATION ;  Surgeon: Anjel Salas DPM;  Location: [facility] Main OR;  Service:          Medications & Allergies:    Prior to Admission medications    Medication Sig Start Date End Date Taking? Authorizing Provider   apixaban (ELIQUIS) 5 mg Take 1 tablet (5 mg total) by mouth 2 (two) times a day 7/16/24   Christine Silva MD   atorvastatin (LIPITOR) 40 mg tablet TAKE 1 TABLET BY MOUTH DAILY WITH DINNER 2/8/24   Raj Auguste DO   Blood Glucose Monitoring Suppl (True Metrix Meter) w/Device KIT Use to test blood sugars 3 times daily 11/24/23   Jaden Espino MD   Blood Pressure Monitoring (BLOOD PRESSURE CUFF) MISC Use to check blood pressure before taking blood pressure medication and 1 hour after and follow instructions provided in discharge instructions based on the readings. 8/13/18   Az Medina MD   calcitriol (ROCALTROL) 0.5 MCG capsule Take 1 capsule (0.5 mcg total) by mouth 3 (three) times a week 7/17/24   Christine Silva MD   cinacalcet (SENSIPAR) 60 MG tablet Take 2 tablets (120 mg total) by mouth 3 (three) times a week 7/17/24   Christine Silva MD   divalproex sodium (DEPAKOTE SPRINKLE) 125 MG capsule TAKE 2 CAPSULES (250 MG TOTAL) BY MOUTH EVERY 12 (TWELVE) HOURS 2/12/24   Raj Auguste DO   glucose blood (True Metrix Blood Glucose Test) test strip Use 1 each 3 (three) times a day Use as instructed 1/9/24   Dagoberto Rich PA-C   Lancets MISC Use 3 (three) times a day Use 3 times daily 1/9/24   Dagoberto Rich PA-C   metoprolol succinate (TOPROL-XL) 50 mg 24 hr tablet Take 1 tablet (50 mg total) by mouth 2 (two) times " a day 7/16/24   Christine Silva MD   prasugrel (EFFIENT) tablet Take 1 tablet (5 mg total) by mouth daily 5/3/24   Britney Tan MD   sacubitril-valsartan (Entresto) 24-26 MG TABS Take 1 tablet by mouth in the morning 7/16/24   Christine Silva MD   Sevelamer Carbonate 2.4 g PACK VIGOROUSLY MIX CONTENTS OF 1 PACKET IN WATER (IT WILL NOT DISSOLVE) AND DRINK 3 TIMES DAILY WITH MEALS 4/21/23   Historical Provider, MD   Vitamin D3 1.25 MG (17625 UT) capsule TAKE 1 CAPSULE BY MOUTH ONE TIME PER WEEK 4/17/24   Mary Torres MD         Allergies: No Known Allergies      Social History:    Substance Use History:   Social History     Substance and Sexual Activity   Alcohol Use Never     Social History     Tobacco Use   Smoking Status Never   Smokeless Tobacco Never     Social History     Substance and Sexual Activity   Drug Use No         Family History:    Per medical chart, significant for lung cancer and leukemia in mother, liver disease in mother and father, heart disease and diabetes mellitus in father, and for multiple myeloma and breast cancer in sister.      Physical Exam:     Vitals:   Blood Pressure: 120/58 (07/17/24 1207)  Pulse: 81 (07/17/24 1207)  Temperature: 97.8 °F (36.6 °C) (07/17/24 0911)  Temp Source: Tympanic (07/17/24 0911)  Respirations: 18 (07/17/24 1207)  Weight - Scale: 103 kg (227 lb 15.3 oz) (07/17/24 0911)  SpO2: 95 % (07/17/24 1130)      GENERAL Obese - waxing/waning distress from pain   HEAD   Normocephalic - atraumatic   EYES Nonicteric   MOUTH   Mucosa moist   NECK   Supple - full range of motion   CARDIAC Rate controlled   PULMONARY Mildly diminished at the bases but fairly clear otherwise   ABDOMEN   Soft - nontender/nondistended - active bowel sounds   MUSCULOSKELETAL   Motor strength/range of motion deconditioned with right thigh and calf tenderness to palpation   NEUROLOGIC   Alert/oriented at baseline   SKIN   Chronic wrinkles/blemishes    PSYCHIATRIC    Mood/affect mildly anxious         Additional Data:     Labs & Recent Cultures:    Results from last 7 days   Lab Units 07/17/24  0928 07/17/24 0926   WBC Thousand/uL  --  5.23   HEMOGLOBIN g/dL  --  8.7*   I STAT HEMOGLOBIN g/dl 8.5*  --    HEMATOCRIT %  --  27.7*   HEMATOCRIT, ISTAT % 25*  --    PLATELETS Thousands/uL  --  119*   SEGS PCT %  --  78*   LYMPHO PCT %  --  8*   MONO PCT %  --  12   EOS PCT %  --  1     Results from last 7 days   Lab Units 07/17/24  0928 07/17/24  0926 07/16/24  0445 07/15/24  0623   SODIUM mmol/L  --  133*   < > 133*   POTASSIUM mmol/L  --  5.0   < > 6.0*   CHLORIDE mmol/L  --  96   < > 96   CO2 mmol/L  --  27   < > 23   CO2, I-STAT mmol/L 30  --   --   --    BUN mg/dL  --  50*   < > 70*   CREATININE mg/dL  --  7.54*   < > 8.75*   ANION GAP mmol/L  --  10   < > 14*   CALCIUM mg/dL  --  8.3*   < > 8.3*   ALBUMIN g/dL  --   --   --  3.6   TOTAL BILIRUBIN mg/dL  --   --   --  0.62   ALK PHOS U/L  --   --   --  99   ALT U/L  --   --   --  6*   AST U/L  --   --   --  6*   GLUCOSE RANDOM mg/dL  --  97   < > 101    < > = values in this interval not displayed.     Results from last 7 days   Lab Units 07/14/24  1901   INR  1.01     Results from last 7 days   Lab Units 07/16/24  1121 07/16/24  0718 07/15/24  2133 07/15/24  1515 07/15/24  1208 07/15/24  0731 07/15/24  0202   POC GLUCOSE mg/dl 118 104 177* 97 113 98 110     Results from last 7 days   Lab Units 07/14/24  1901   HEMOGLOBIN A1C % 5.9*     Results from last 7 days   Lab Units 07/15/24  0450 07/14/24  1901   PROCALCITONIN ng/ml 0.31* 0.33*                 Lines/Drains:  Invasive Devices       Peripheral Intravenous Line  Duration             Peripheral IV 07/17/24 Right Antecubital <1 day              Line  Duration             Hemodialysis Access Left Upper arm -- days                      Imaging:     XR Trauma multiple (B/RA trauma bay ONLY)    Result Date: 7/17/2024  Narrative: XR TRAUMA MULTIPLE, XR FEMUR 1 VW RIGHT, XR  CHEST 1 VIEW INDICATION: TRAUMA. COMPARISON: Two-view chest 7/14/2024. FINDINGS: CHEST: Supine frontal view of the chest is obtained. Clear lungs. No evidence of a pneumothorax or pleural effusion. Upright images are more sensitive to detect pneumothoraces if relevant. Normal cardiomediastinal silhouette accounting for technique and patient positioning. No displaced fracture. Right femur 1 view reviewed. No acute femoral fracture. Prominent atherosclerotic calcifications.     Impression: No acute cardiopulmonary disease within limitations of supine imaging. No acute femoral fracture Computerized Assisted Algorithm (CAA) may have been used to analyze all applicable images. Workstation performed: [id number]       XR chest 1 view    Result Date: 7/17/2024  Narrative: XR TRAUMA MULTIPLE, XR FEMUR 1 VW RIGHT, XR CHEST 1 VIEW INDICATION: TRAUMA. COMPARISON: Two-view chest 7/14/2024. FINDINGS: CHEST: Supine frontal view of the chest is obtained. Clear lungs. No evidence of a pneumothorax or pleural effusion. Upright images are more sensitive to detect pneumothoraces if relevant. Normal cardiomediastinal silhouette accounting for technique and patient positioning. No displaced fracture. Right femur 1 view reviewed. No acute femoral fracture. Prominent atherosclerotic calcifications.     Impression: No acute cardiopulmonary disease within limitations of supine imaging. No acute femoral fracture Computerized Assisted Algorithm (CAA) may have been used to analyze all applicable images. Workstation performed: [id number]       XR femur 1 vw right    Result Date: 7/17/2024  Narrative: XR TRAUMA MULTIPLE, XR FEMUR 1 VW RIGHT, XR CHEST 1 VIEW INDICATION: TRAUMA. COMPARISON: Two-view chest 7/14/2024. FINDINGS: CHEST: Supine frontal view of the chest is obtained. Clear lungs. No evidence of a pneumothorax or pleural effusion. Upright images are more sensitive to detect pneumothoraces if relevant. Normal cardiomediastinal silhouette  accounting for technique and patient positioning. No displaced fracture. Right femur 1 view reviewed. No acute femoral fracture. Prominent atherosclerotic calcifications.     Impression: No acute cardiopulmonary disease within limitations of supine imaging. No acute femoral fracture Computerized Assisted Algorithm (CAA) may have been used to analyze all applicable images. Workstation performed: [id number]     TRAUMA - CT spine cervical wo contrast    Result Date: 7/17/2024  Narrative: CT CERVICAL SPINE - WITHOUT CONTRAST INDICATION:   TRAUMA. COMPARISON: CTA head and neck 4/29/2022. TECHNIQUE:  CT examination of the cervical spine was performed without intravenous contrast.  Contiguous axial images were obtained. Multiplanar 2D reformatted images were created from the source data. Radiation dose length product (DLP) for this visit:  599 mGy-cm .  This examination, like all CT scans performed in the [facility], was performed utilizing techniques to minimize radiation dose exposure, including the use of iterative reconstruction and automated exposure control. IMAGE QUALITY:  Diagnostic. FINDINGS: ALIGNMENT:  Normal alignment of the cervical spine. No subluxation. VERTEBRAE:  No fracture. DEGENERATIVE CHANGES: Mild multilevel cervical degenerative changes are noted without critical central canal stenosis. PREVERTEBRAL AND PARASPINAL SOFT TISSUES: Extensive atherosclerotic changes. THORACIC INLET:  Normal.     Impression: No cervical spine fracture or traumatic malalignment. The study was marked in EPIC for immediate notification. Workstation performed: [id number]       TRAUMA - CT head wo contrast    Result Date: 7/17/2024  Narrative: CT BRAIN - WITHOUT CONTRAST INDICATION:   TRAUMA. COMPARISON: CT head 1/5/2023 TECHNIQUE:  CT examination of the brain was performed.  Multiplanar 2D reformatted images were created from the source data. Radiation dose length product (DLP) for this visit:  1230 mGy-cm .   This examination, like all CT scans performed in the [facility], was performed utilizing techniques to minimize radiation dose exposure, including the use of iterative reconstruction and automated exposure control. IMAGE QUALITY:  Diagnostic. FINDINGS: PARENCHYMA: Decreased attenuation is noted in periventricular and subcortical white matter demonstrating an appearance that is statistically most likely to represent mild microangiopathic change; this appearance is similar when compared to most recent prior examination. Small focus of right upper parietal encephalomalacia. Chronic left basal ganglia lacunar infarct. No CT signs of acute infarction.  No intracranial mass, mass effect or midline shift.  No acute parenchymal hemorrhage. VENTRICLES AND EXTRA-AXIAL SPACES:  Normal for the patient's age. VISUALIZED ORBITS: Normal visualized orbits. PARANASAL SINUSES: Moderate mucosal thickening of the visualized the paranasal sinuses. CALVARIUM AND EXTRACRANIAL SOFT TISSUES:  Normal.     Impression: No acute intracranial abnormality. Workstation performed: [id number]               ARTURO PECK MD   Hospitalist - [facility]          ** Please Note: This note is constructed using a voice recognition dictation system.  An occasional wrong word/phrase or “sound-a-like” substitution may have been picked up by dictation device due to the inherent limitations of voice recognition software.  Read the chart carefully and recognize, using reasonable context, where substitutions may have occurred.**

## 2024-07-17 NOTE — CASE MANAGEMENT
Case Management ED Progress Note    Patient name Asad Galloway  Location TR-/TR- MRN [id number]  : 1960 Date 2024        OBJECTIVE:    Chief Complaint: Fall   Patient Class: Emergency  Preferred Pharmacy:   [id number] - [place] - [address]  [address]  [place] [address]  Phone: [phone] Fax: [phone]    Primary Care Provider: Raj Auguste DO    Primary Insurance: MEDICARE  Secondary Insurance: [facility]    ED Progress Note:  CM responded to trauma alert. Patient was transported into trauma bay by [facility] for eval. Patient at baseline mentation.     CM met with wife and son in ED waiting room- escorted them to family waiting room.     Patient discharged from [facility] yesterday. Per family, patient was getting around fine at home, but fell when trying to go up the steps- despite family asking him not to.     Trauma AP aware of above.      CM will follow and update screening, assessment, and discharge planning as appropriate.

## 2024-07-17 NOTE — ASSESSMENT & PLAN NOTE
Follows outpatient podiatry  Status post right toe amputation  S/p arteriogram earlier this year with a multitude of LLE vascular intervention  Continue Effient/statin

## 2024-07-17 NOTE — ASSESSMENT & PLAN NOTE
Recently diagnosed on hospitalization earlier this month (just discharged yesterday)  Continue Eliquis

## 2024-07-17 NOTE — ASSESSMENT & PLAN NOTE
After returning home from being discharged from the hospital yesterday, sustained a syncopal episode while walking up steps with subsequent fall backwards and reported head strike-> lifted up and assisted by son to his room at the time  Presented to the ED today after inability to lift himself off a toilet seat with weakness  CT of head and cervical spine unremarkable for acute injury/abnormalities - remainder of traumatic radiology also unremarkable for acute injuries  Check orthostatics  Check thyroid panel  Consider vasovagal etiology ??  In the setting of CHF, check updated cardiogram  Will appreciate cardiology input

## 2024-07-17 NOTE — CONSULTS
Consultation - Cardiology Team One  Asad Galloway 64 y.o. male MRN: [id number]  Unit/Bed#: ED-30 Encounter: [id number]    Inpatient consult to Cardiology  Consult performed by: BRITTANY Grijalva  Consult ordered by: Catia Mccauley MD      Physician Requesting Consult: Catia Mccauley MD  Reason for Consult / Principal Problem: Syncope     Assessment/ Plan    Fall   Patient and wife report mechanical fall and note no LOC   He denies dizziness, lightheaded, chest pain or SOB prior or after fall  He reports no syncope or history of syncope     2. Ischemic cardiomyopathy   EF 40% on echocardiogram from 12/2023  On GDMT: metoprolol XL and Entresto   Euvolemic on exam. Volume status managed by HD     3. MVCAD   s/p PCI of OM 5/2022 and PCI of LAD and LCx 6/2022  On Effient, atorvastatin and metoprolol XL   No cardiac complaints     4. Valvular disease   Echocardiogram 12/2023 showed EF 40%, grade I diastolic dysfunction, akinetic apex, hypokinetic mid inferior, mid inferolateral, apical anterior, apical inferior and apical lateral, mild to moderate AI, moderate to severe AS and moderate MR.   Repeat echocardiogram pending     5. ESRD on HD   Followed by  nephrology   HD M/W/F     6. Hypertension   Average /55  On metoprolol XL 50 mg PO BID and entresto 24/26 mg PO daily     7. Hyperlipidemia   LDL 21  On atorvastatin 40 mg PO daily     8. Type II diabetes   Hemoglobin A1C 5.9  On SSI     9. History of CVA   On effient and atorvastatin     10. R peroneal DVT   Followed by primary team   On Eliquis 5 mg PO BID     History of Present Illness   HPI: Asad Galloway is a 64 y.o. year old male who has ischemic cardiomyopathy, MVCAD s/p PCI of OM 5/2022 and PCI of LAD and LCx 6/2022, moderate aortic stenosis, moderate MR,  ESRD on HD, hypertension, hyperlipidemia, type II diabetes, L MCA CVA, diabetic polyneuropathy and osteomyelitis. He follows with cardiologist Dr. Tan.              He presents to [facility] ER 7/17/2024  with complaint of syncope yesterday. Patient was discharged yesterday to home. He was admitted 7/14-7/16  with incidental finding of R peroneal DVT. He originally presented with URI. He was discharged home with family. Patient lives in a Bilevel home. His wife reports he was on the bottom floor and she was on the second floor. He took two steps upstairs and fell backwards. She reports she witnessed the fall. Patient and wife report no LOC. Patient reports he lost his balance and fell. He denies dizziness, lightheaded, chest pain, SOB, syncope or near syncope. He reports no history of syncope. He notes no injury from the fall. This morning he used the bathroom and could not get off the toilet due to severe pain in his R leg. His wife called EMS and was brought to [facility].     Echocardiogram 12/2023 showed EF 40%, grade I diastolic dysfunction, akinetic apex, hypokinetic mid inferior, mid inferolateral, apical anterior, apical inferior and apical lateral, mild to moderate AI, moderate to severe AS and moderate MR.     EKG reviewed personally:   Normal sinus rhythm with RBBB and ST and T wave abnormalities, no change from prior   Ventricular rate 68 bpm  AK interval 234 ms  QRSD 164 ms  QT interval 486 ms  QTc interval 516 ms     Telemetry reviewed personally:  Normal sinus rhythm, no ectopy     Review of Systems   Constitutional: Negative for chills and fever.   HENT:  Negative for congestion.    Cardiovascular:  Negative for chest pain, dyspnea on exertion, leg swelling, orthopnea and palpitations.   Respiratory:  Negative for shortness of breath.    Musculoskeletal:  Positive for falls.   Gastrointestinal:  Negative for bloating, nausea and vomiting.   Neurological:  Negative for dizziness and light-headedness.   Psychiatric/Behavioral:  Negative for altered mental status.    All other systems reviewed and are negative.    Historical Information   Past Medical History:   Diagnosis Date    Cerebrovascular accident (CVA)  due to thrombosis of left middle cerebral artery (HCC) 07/29/2018    Chronic kidney disease     Coronary artery disease     Diabetes mellitus (HCC)     not on meds    Dialysis patient ([facility])     M-W-F    Fistula     left upper arm for hemodialyis    GERD (gastroesophageal reflux disease)     History of coronary artery stent placement     x3    Hypercholesteremia     Hyperlipidemia     Hypertension     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    Limb alert care status     no BP/IV left arm    Neuropathy     Obesity     Osteomyelitis (HCC)     last assessed 11/4/16-per son not currently    PVC's (premature ventricular contractions)     sees [facility]    Stroke ([facility])     last weeof July 2018 [facility]    TIA (transient ischemic attack) 10/28/2018    Wears dentures     Wears glasses      Past Surgical History:   Procedure Laterality Date    ABDOMINAL SURGERY      CARDIAC CATHETERIZATION N/A 05/02/2022    Procedure: Cardiac Coronary Angiogram;  Surgeon: Sam Davis MD;  Location: [facility] C[facility];  Service: Cardiology    CARDIAC CATHETERIZATION N/A 05/02/2022    Procedure: Cardiac pci;  Surgeon: Sam Davis MD;  [facility];  Service: Cardiology    CARDIAC CATHETERIZATION  02/01/2023    Procedure: Cardiac catheterization;  Surgeon: Sam Davis MD;  [facility]: [facility];  Service: Cardiology    CARDIAC CATHETERIZATION N/A 02/01/2023    Procedure: Cardiac pci;  Surgeon: Sam Davis MD;  [facility];  Service: Cardiology    CARDIAC CATHETERIZATION N/A 02/01/2023    Procedure: Cardiac Coronary Angiogram;  Surgeon: Sam Davis MD;  [facility];  Service: Cardiology    CARDIAC CATHETERIZATION N/A 02/01/2023    Procedure: Cardiac other-IVUS;  Surgeon: Sam Davis MD;  [facility];  Service: Cardiology    CHOLECYSTECTOMY      Percutaneous    COLONOSCOPY      CYSTOSCOPY      HEMODIALYSIS ADULT  1/22/2024    HEMODIALYSIS ADULT  " 1/24/2024    IR LOWER EXTREMITY ANGIOGRAM  9/29/2023    IR LOWER EXTREMITY ANGIOGRAM  2/26/2024    OTHER SURGICAL HISTORY      \"stimulator to control bowel movements\"    CO ESOPHAGOGASTRODUODENOSCOPY TRANSORAL DIAGNOSTIC N/A 09/27/2016    Procedure: ESOPHAGOGASTRODUODENOSCOPY (EGD);  Surgeon: Adele Rowe MD;  Location: [facility] GI LAB;  Service: Gastroenterology    CO LAPAROSCOPY SURG CHOLECYSTECTOMY N/A 02/29/2016    Procedure: LAPAROSCOPIC CHOLECYSTECTOMY ;  Surgeon: Cliff Roman DO;  Location: [facility] Main OR;  Service: General    CO SLCTV CATHJ 3RD+ ORD SLCTV ABDL PEL/LXTR BRNCH Left 9/29/2023    Procedure: Left leg angiogram with intervention;  Surgeon: Michelle Galvan MD;  Location: [facility] MAIN [facility];  Service: Vascular    CO SLCTV CATHJ 3RD+ ORD SLCTV ABDL PEL/LXTR BRNCH Left 2/26/2024    Procedure: Left leg angiogram antegrade access, left popliteal angioplasty;  Surgeon: Michelle Galvan MD;  Location: [facility];  Service: Vascular    ROTATOR CUFF REPAIR Right     SPINAL CORD STIMULATOR IMPLANT      \"Medtronic interstim model # 3058- in lower back to control bowel movements-currently turned off-battery is dead\"    TOE AMPUTATION Right 10/28/2016    Procedure: 3RD TOE AMPUTATION ;  Surgeon: Anjel aSlas DPM;  Location: [facility];  Service:      Social History     Substance and Sexual Activity   Alcohol Use Never     Social History     Substance and Sexual Activity   Drug Use No     Social History     Tobacco Use   Smoking Status Never   Smokeless Tobacco Never     Family History:   Family History   Problem Relation Age of Onset    Leukemia Mother     Liver disease Mother     Lung cancer Mother         heavy smoker - 3 ppd    Heart disease Father     Liver disease Father     Diabetes type I Father     Multiple myeloma Sister     Breast cancer Sister     Urolithiasis Family     Alcohol abuse Neg Hx     Depression Neg Hx     Drug abuse Neg Hx     Substance Abuse Neg Hx     Mental illness Neg Hx  "       Meds/Allergies   all current active meds have been reviewed and current meds:   Current Facility-Administered Medications   Medication Dose Route Frequency    acetaminophen (TYLENOL) tablet 650 mg  650 mg Oral Q6H PRN    apixaban (ELIQUIS) tablet 5 mg  5 mg Oral BID    atorvastatin (LIPITOR) tablet 40 mg  40 mg Oral Daily With Dinner    calcitriol (ROCALTROL) capsule 0.5 mcg  0.5 mcg Oral Once per day on     cinacalcet (SENSIPAR) tablet 120 mg  120 mg Oral Once per day on     divalproex sodium (DEPAKOTE SPRINKLE) capsule 250 mg  250 mg Oral Q12H MIHC    insulin lispro (HumALOG/ADMELOG) 100 units/mL subcutaneous injection 1-6 Units  1-6 Units Subcutaneous 4x Daily (AC & HS)    metoprolol succinate (TOPROL-XL) 24 hr tablet 50 mg  50 mg Oral BID    prasugrel (EFFIENT) tablet 5 mg  5 mg Oral Daily    sacubitril-valsartan (ENTRESTO) 24-26 MG per tablet 1 tablet  1 tablet Oral Daily    trimethobenzamide (TIGAN) IM injection 200 mg  200 mg Intramuscular Q6H PRN          No Known Allergies    Objective   Vitals: Blood pressure 106/55, pulse 71, temperature 97.8 °F (36.6 °C), temperature source Tympanic, resp. rate 18, weight 103 kg (227 lb 15.3 oz), SpO2 96%.,     Body mass index is 34.66 kg/m².,     Systolic (24hrs), Av , Min:96 , Max:129     Diastolic (24hrs), Av, Min:48, Max:59          No intake or output data in the 24 hours ending 24 1130  Weight (last 2 days)       Date/Time Weight    24 09:11:33 103 (227.96)          Invasive Devices       Peripheral Intravenous Line  Duration             Peripheral IV 24 Right Antecubital <1 day              Line  Duration             Hemodialysis Access Left Upper arm -- days                  Physical Exam  Constitutional:       General: He is not in acute distress.     Appearance: He is obese.   HENT:      Head: Normocephalic.      Mouth/Throat:      Mouth: Mucous membranes are moist.   Cardiovascular:       Rate and Rhythm: Normal rate and regular rhythm.      Pulses: Normal pulses.      Heart sounds: Murmur heard.   Pulmonary:      Effort: Pulmonary effort is normal. No respiratory distress.      Breath sounds: Normal breath sounds.   Abdominal:      General: Bowel sounds are normal.      Palpations: Abdomen is soft.   Musculoskeletal:         General: No swelling. Normal range of motion.      Cervical back: Neck supple.   Skin:     General: Skin is warm and dry.      Capillary Refill: Capillary refill takes less than 2 seconds.   Neurological:      Mental Status: He is oriented to person, place, and time.   Psychiatric:         Mood and Affect: Mood normal.           LABORATORY RESULTS:      CBC with diff:   Results from last 7 days   Lab Units 07/17/24  0928 07/17/24  0926 07/15/24  0450 07/14/24  1901   WBC Thousand/uL  --  5.23 7.39 6.20   HEMOGLOBIN g/dL  --  8.7* 9.3* 9.8*   I STAT HEMOGLOBIN g/dl 8.5*  --   --   --    HEMATOCRIT %  --  27.7* 29.0* 31.6*   HEMATOCRIT, ISTAT % 25*  --   --   --    MCV fL  --  101* 99* 100*   PLATELETS Thousands/uL  --  119* 136* 144*   RBC Million/uL  --  2.74* 2.94* 3.15*   MCH pg  --  31.8 31.6 31.1   MCHC g/dL  --  31.4 32.1 31.0*   RDW %  --  15.9* 15.8* 15.6*   MPV fL  --  10.3 11.0 10.1   NRBC AUTO /100 WBCs  --  0 0 0       CMP:  Results from last 7 days   Lab Units 07/17/24  0928 07/17/24  0926 07/16/24  0445 07/15/24  0623 07/14/24  1901   POTASSIUM mmol/L  --  5.0 5.0 6.0* 4.9   CHLORIDE mmol/L  --  96 96 96 94*   CO2 mmol/L  --  27 30 23 28   CO2, I-STAT mmol/L 30  --   --   --   --    BUN mg/dL  --  50* 31* 70* 65*   CREATININE mg/dL  --  7.54* 5.49* 8.75* 8.33*   GLUCOSE, ISTAT mg/dl 96  --   --   --   --    CALCIUM mg/dL  --  8.3* 8.6 8.3* 8.9   AST U/L  --   --   --  6* 5*   ALT U/L  --   --   --  6* 8   ALK PHOS U/L  --   --   --  99 115*   EGFR ml/min/1.73sq m  --  6 10 5 6       BMP:  Results from last 7 days   Lab Units 07/17/24  0928 07/17/24  0926  07/16/24  0445 07/15/24  0623 07/14/24  1901   POTASSIUM mmol/L  --  5.0 5.0 6.0* 4.9   CHLORIDE mmol/L  --  96 96 96 94*   CO2 mmol/L  --  27 30 23 28   CO2, I-STAT mmol/L 30  --   --   --   --    BUN mg/dL  --  50* 31* 70* 65*   CREATININE mg/dL  --  7.54* 5.49* 8.75* 8.33*   GLUCOSE, ISTAT mg/dl 96  --   --   --   --    CALCIUM mg/dL  --  8.3* 8.6 8.3* 8.9          Lab Results   Component Value Date    NTBNP 17,967 (H) 02/11/2023    NTBNP 1,859 (H) 01/02/2022    NTBNP 1,259 (H) 12/30/2020            Results from last 7 days   Lab Units 07/15/24  0623   MAGNESIUM mg/dL 2.1          Results from last 7 days   Lab Units 07/14/24  1901   HEMOGLOBIN A1C % 5.9*              Results from last 7 days   Lab Units 07/14/24  1901   INR  1.01     Lipid Profile:   Lab Results   Component Value Date    CHOL 203 (H) 08/10/2016     Lab Results   Component Value Date    HDL 30 (L) 01/30/2023    HDL 33 (L) 07/25/2022    HDL 31 (L) 06/20/2022     Lab Results   Component Value Date    LDLCALC 21 01/30/2023    LDLCALC 19 07/25/2022    LDLCALC 25 06/20/2022     Lab Results   Component Value Date    TRIG 123 01/30/2023    TRIG 151 (H) 07/25/2022    TRIG 182 (H) 06/20/2022     Cardiac testing:   No results found for this or any previous visit.    No results found for this or any previous visit.    No valid procedures specified.  No results found for this or any previous visit.    Imaging:   XR Trauma multiple ([facility] trauma bay ONLY)    Result Date: 7/17/2024  Narrative: XR TRAUMA MULTIPLE, XR FEMUR 1 VW RIGHT, XR CHEST 1 VIEW INDICATION: TRAUMA. COMPARISON: Two-view chest 7/14/2024. FINDINGS: CHEST: Supine frontal view of the chest is obtained. Clear lungs. No evidence of a pneumothorax or pleural effusion. Upright images are more sensitive to detect pneumothoraces if relevant. Normal cardiomediastinal silhouette accounting for technique and patient positioning. No displaced fracture. Right femur 1 view reviewed. No acute femoral  fracture. Prominent atherosclerotic calcifications.     Impression: No acute cardiopulmonary disease within limitations of supine imaging. No acute femoral fracture Computerized Assisted Algorithm (CAA) may have been used to analyze all applicable images. Workstation performed: [id number]     XR chest 1 view    Result Date: 7/17/2024  Narrative: XR TRAUMA MULTIPLE, XR FEMUR 1 VW RIGHT, XR CHEST 1 VIEW INDICATION: TRAUMA. COMPARISON: Two-view chest 7/14/2024. FINDINGS: CHEST: Supine frontal view of the chest is obtained. Clear lungs. No evidence of a pneumothorax or pleural effusion. Upright images are more sensitive to detect pneumothoraces if relevant. Normal cardiomediastinal silhouette accounting for technique and patient positioning. No displaced fracture. Right femur 1 view reviewed. No acute femoral fracture. Prominent atherosclerotic calcifications.     Impression: No acute cardiopulmonary disease within limitations of supine imaging. No acute femoral fracture Computerized Assisted Algorithm (CAA) may have been used to analyze all applicable images. Workstation performed: [id number]     XR femur 1 vw right    Result Date: 7/17/2024  Narrative: XR TRAUMA MULTIPLE, XR FEMUR 1 VW RIGHT, XR CHEST 1 VIEW INDICATION: TRAUMA. COMPARISON: Two-view chest 7/14/2024. FINDINGS: CHEST: Supine frontal view of the chest is obtained. Clear lungs. No evidence of a pneumothorax or pleural effusion. Upright images are more sensitive to detect pneumothoraces if relevant. Normal cardiomediastinal silhouette accounting for technique and patient positioning. No displaced fracture. Right femur 1 view reviewed. No acute femoral fracture. Prominent atherosclerotic calcifications.     Impression: No acute cardiopulmonary disease within limitations of supine imaging. No acute femoral fracture Computerized Assisted Algorithm (CAA) may have been used to analyze all applicable images. Workstation performed: [id number]     TRAUMA - CT  spine cervical wo contrast    Result Date: 7/17/2024  Narrative: CT CERVICAL SPINE - WITHOUT CONTRAST INDICATION:   TRAUMA. COMPARISON: CTA head and neck 4/29/2022. TECHNIQUE:  CT examination of the cervical spine was performed without intravenous contrast.  Contiguous axial images were obtained. Multiplanar 2D reformatted images were created from the source data. Radiation dose length product (DLP) for this visit:  599 mGy-cm .  This examination, like all CT scans performed in the [facility], was performed utilizing techniques to minimize radiation dose exposure, including the use of iterative reconstruction and automated exposure control. IMAGE QUALITY:  Diagnostic. FINDINGS: ALIGNMENT:  Normal alignment of the cervical spine. No subluxation. VERTEBRAE:  No fracture. DEGENERATIVE CHANGES: Mild multilevel cervical degenerative changes are noted without critical central canal stenosis. PREVERTEBRAL AND PARASPINAL SOFT TISSUES: Extensive atherosclerotic changes. THORACIC INLET:  Normal.     Impression: No cervical spine fracture or traumatic malalignment. The study was marked in EPIC for immediate notification. Workstation performed: [id number]     TRAUMA - CT head wo contrast    Result Date: 7/17/2024  Narrative: CT BRAIN - WITHOUT CONTRAST INDICATION:   TRAUMA. COMPARISON: CT head 1/5/2023 TECHNIQUE:  CT examination of the brain was performed.  Multiplanar 2D reformatted images were created from the source data. Radiation dose length product (DLP) for this visit:  1230 mGy-cm .  This examination, like all CT scans performed in the [facility], was performed utilizing techniques to minimize radiation dose exposure, including the use of iterative reconstruction and automated exposure control. IMAGE QUALITY:  Diagnostic. FINDINGS: PARENCHYMA: Decreased attenuation is noted in periventricular and subcortical white matter demonstrating an appearance that is statistically most likely to  represent mild microangiopathic change; this appearance is similar when compared to most recent prior examination. Small focus of right upper parietal encephalomalacia. Chronic left basal ganglia lacunar infarct. No CT signs of acute infarction.  No intracranial mass, mass effect or midline shift.  No acute parenchymal hemorrhage. VENTRICLES AND EXTRA-AXIAL SPACES:  Normal for the patient's age. VISUALIZED ORBITS: Normal visualized orbits. PARANASAL SINUSES: Moderate mucosal thickening of the visualized the paranasal sinuses. CALVARIUM AND EXTRACRANIAL SOFT TISSUES:  Normal.     Impression: No acute intracranial abnormality. Workstation performed: [id number]     VAS VENOUS DUPLEX -LOWER LIMB UNILATERAL    Result Date: 7/14/2024  Narrative:  [facility] REPORT CLINICAL: Indications: Patient presents with right lower extremity pain and swelling x 2 days. Operative History: 2023-09-29 Left Ir lower extremity angiogram w/ intervention 2023-02-01 Cardiac catheterization 2022-05-02 Cardiac catheterization 2016-10-28 Right 3rd Toe Amputation Left Upper arm AVF Risk Factors The patient has history of Obesity, HTN, Diabetes (Yes), Hyperlipidemia, CKD, PAD and CAD.  FINDINGS:  Right     Impression           Peroneal  Occlusive Segmental     CONCLUSION: Impression: RIGHT LOWER LIMB Evidence of acute vs sub acute, occlusive deep vein thrombosis within (one) of the paired peroneal veins at the proximal calf. No evidence of superficial thrombophlebitis noted. Doppler evaluation shows a normal response to augmentation maneuvers. Popliteal, posterior tibial and anterior tibial arterial Doppler waveform's are triphasic/hyperemic.  LEFT LOWER LIMB LIMITED Evaluation shows no evidence of thrombus in the common femoral vein. Doppler evaluation shows a normal response to augmentation maneuvers.  Technical findings were given to Jamaal Stark DO via [employer] secure chat.  SIGNATURE: Electronically Signed by: NAYELI SNOW  on 2024-07-14 10:56:39 PM    XR chest 2 views    Result Date: 7/14/2024  Narrative: XR CHEST AP AND LATERAL INDICATION: Cough. COMPARISON: CXR 6/3/2024, chest CT 7/16/2023. FINDINGS: Question mild pulmonary venous congestion. No pneumothorax or pleural effusion. Mild cardiomegaly, coronary artery stents. Bones are unremarkable for age. Normal upper abdomen.     Impression: Question mild pulmonary venous congestion. Workstation performed: [id number]     XR ankle 3+ views RIGHT    Result Date: 7/6/2024  Narrative: XR ANKLE 3+ VW RIGHT INDICATION: Patient reports 3-day history of sudden onset, worsening right ankle pain following twisting injury. Pain is along the medial malleolus on physical exam. COMPARISON: Right foot radiograph 3/11/2023 FINDINGS: Osseous fragment adjacent to the medial malleolus concerning for an avulsion fracture. This is not well corticated suggesting it may be acute. Avulsion arising from the lateral aspect of the ankle with a small ossific fragment present may also suggest an avulsion injury. Plantar calcaneal enthesophyte. No lytic or blastic osseous lesion. Soft tissue swelling over the medial malleolus. Extensive atherosclerotic calcifications.     Impression: Findings are concerning for a medial malleolus avulsion fracture. Avulsion fragment also be evident in the soft tissues lateral to the ankle. This is of indeterminate chronicity. Extensive atherosclerotic calcifications. Official interpretation of the examination includes a discrepancy from the preliminary interpretation and was documented as such in [facility] for liaison and referring practitioner notification. I have personally reviewed this study including all images.  / R.J.F. The study was marked in EPIC for immediate notification. Computerized Assisted Algorithm (CAA) may have been used to analyze all applicable images. Resident: LINDA Church I, the attending radiologist, have reviewed the images and agree with the final report above.  Workstation performed: [id number]     XR tibia fibula 2 views RIGHT    Result Date: 7/6/2024  Narrative: XR TIBIA FIBULA 2 VW RIGHT INDICATION: Sudden right ankle pain following twisting injury 3 days ago. COMPARISON: Concurrent right ankle radiograph FINDINGS: Medial malleolus avulsion fracture better appreciated on concurrent right ankle radiograph. No fracture of the proximal fibula. Plantar calcaneal enthesophyte. No lytic or blastic osseous lesion. Extensive atherosclerotic calcifications.     Impression: Medial malleolus avulsion fracture better appreciated on concurrent right ankle radiograph. No fracture of the proximal fibula. Extensive atherosclerotic calcifications. I have personally reviewed this study including all images.  / BRADEN. Resident: LINDA Church I, the attending radiologist, have reviewed the images and agree with the final report above. Workstation performed: [id number]     Thank you for allowing us to participate in this patient's care.     Counseling / Coordination of Care  Total floor / unit time spent today 45 minutes.  Greater than 50% of total time was spent with the patient and / or family counseling and / or coordination of care.  A description of the counseling / coordination of care: Review of history, current assessment, development of a plan.      Code Status: Level 1 - Full Code    ** Please Note: Dragon 360 Dictation voice to text software may have been used in the creation of this document. **

## 2024-07-17 NOTE — ASSESSMENT & PLAN NOTE
-Syncopal fall, fell backwards hitting head steps yesterday shortly after being discharged from hospital; fell down 5 steps  -On Eliquis, recently started  -LOC+  -SBP 80s during transport, approx 400cc NS given  -GCS 14, disoriented to time  -Complains of diffuse pain and cramping in right leg  - CT Head, c-spine, and XR RLE negative for acute traumatic injuries  - trauma will continue to follow for tertiary trauma exam 7/18

## 2024-07-17 NOTE — CONSULTS
NEPHROLOGY HOSPITAL CONSULTATION   Asad Galloway 64 y.o. male MRN: [id number]  Unit/Bed#: ED-30 Encounter: [id number]    ASSESSMENT and PLAN:  Asad Galloway is a 64 y.o. male who was admitted to [facility] after presenting with fall. A renal consultation is requested today for assistance in the management of ESRD on HD.      1.  ESRD on HD.  MWF at [facility].  Access is left upper extremity AV fistula.  EDW of 94.6 kg.  Last inpatient treatment 07/15.  He was discharged yesterday and was supposed to get dialysis today at [facility] but presented to hospital after a fall.  Electrolytes and volume status are stable today.  Will plan next hemodialysis session tomorrow.     2.  Hyperkalemia.  Serum potassium level today 5.0.  No indication for dialysis today.    3.  Hypertension/volume status.  Current Rx: Toprol-XL 50 mg twice daily, Entresto 24-26 mg daily.  Blood pressure is currently controlled on current regimen, use hold parameters.     4.  Hyponatremia.  Serum sodium level 133.  This is due to lack of free water clearance in ESRD.  Fluid restriction at 1200 cc per 24 hours.     5.  Anemia in ESRD.  Hemoglobin below goal at 8.7.  Not on BAYLEE due to history of CVA and recent diagnosis of right peroneal DVT.     6.  Secondary hyperparathyroidism of renal origin.  Continue calcitriol 0.5 mcg 3 times a week, Sensipar 120 mg 3 times a week.     7.  Right peroneal DVT.  This was diagnosed on recent admission.  Continue Eliquis 5 mg twice daily.  Dose was discussed with outpatient nephrologist and there was agreement.     8.  PAD with left heel tissue loss.  Continue local wound care.  Outpatient follow-up with vascular surgery.    9.  Fall, questionable syncope.  Evaluation per primary team.    Discussed with internal medicine team.  After discussion, we agreed that patient is currently stable from nephrology perspective and next hemodialysis session can be performed tomorrow.    HISTORY OF PRESENT  ILLNESS:  Requesting Physician: Catia Mccauley MD  Reason for Consult: ESRD on HD    Asad Galloway is a 64 y.o. male who was admitted to [facility] after presenting with fall. A renal consultation is requested today for assistance in the management of ESRD on HD.  Patient presented after a fall which happened yesterday shortly after getting discharged from the hospital.  Patient reportedly fell backwards going up 1 step and hit the back of his head to the ground.  Unclear if he lost consciousness.  He was supposed to get dialysis today but was not able to go as he presented to ED for further evaluation.    PAST MEDICAL HISTORY:  Past Medical History:   Diagnosis Date    Cerebrovascular accident (CVA) due to thrombosis of left middle cerebral artery (HCC) 07/29/2018    Chronic kidney disease     Coronary artery disease     Diabetes mellitus (HCC)     not on meds    Dialysis patient (HCC)     M-W-F    Fistula     left upper arm for hemodialyis    GERD (gastroesophageal reflux disease)     History of coronary artery stent placement     x3    Hypercholesteremia     Hyperlipidemia     Hypertension     Infectious viral hepatitis     B as child    Limb alert care status     no BP/IV left arm    Neuropathy     Obesity     Osteomyelitis (HCC)     last assessed 11/4/16-per son not currently    PVC's (premature ventricular contractions)     sees  Cardio    Stroke (HCC)     last weeof July 2018 [facility]    TIA (transient ischemic attack) 10/28/2018    Wears dentures     Wears glasses        PAST SURGICAL HISTORY:  Past Surgical History:   Procedure Laterality Date    ABDOMINAL SURGERY      CARDIAC CATHETERIZATION N/A 05/02/2022    Procedure: Cardiac Coronary Angiogram;  Surgeon: Sam Davis MD;  [facility];  Service: Cardiology    CARDIAC CATHETERIZATION N/A 05/02/2022    Procedure: Cardiac pci;  Surgeon: Sam Davis MD;  [facility];  Service: Cardiology     "CARDIAC CATHETERIZATION  02/01/2023    Procedure: Cardiac catheterization;  Surgeon: Sam Davis MD;  Location: [facility]ARD[facility];  Service: Cardiology    CARDIAC CATHETERIZATION N/A 02/01/2023    Procedure: Cardiac pci;  Surgeon: Sam Davis MD;  Location: [facility]ARD[facility];  Service: Cardiology    CARDIAC CATHETERIZATION N/A 02/01/2023    Procedure: Cardiac Coronary Angiogram;  Surgeon: Sam Davis MD;  Location: [facility]ARD[facility];  Service: Cardiology    CARDIAC CATHETERIZATION N/A 02/01/2023    Procedure: Cardiac other-IVUS;  Surgeon: Sam Davis MD;  Location: [facility]ARD[facility];  Service: Cardiology    CHOLECYSTECTOMY      Percutaneous    COLONOSCOPY      CYSTOSCOPY      HEMODIALYSIS ADULT  1/22/2024    HEMODIALYSIS ADULT  1/24/2024    IR LOWER EXTREMITY ANGIOGRAM  9/29/2023    IR LOWER EXTREMITY ANGIOGRAM  2/26/2024    OTHER SURGICAL HISTORY      \"stimulator to control bowel movements\"    CO ESOPHAGOGASTRODUODENOSCOPY TRANSORAL DIAGNOSTIC N/A 09/27/2016    Procedure: ESOPHAGOGASTRODUODENOSCOPY (EGD);  Surgeon: Adele Rowe MD;  Location: [facility] GI LAB;  Service: Gastroenterology    CO LAPAROSCOPY SURG CHOLECYSTECTOMY N/A 02/29/2016    Procedure: LAPAROSCOPIC CHOLECYSTECTOMY ;  Surgeon: Cliff Roman DO;  Location: [facility] Main OR;  Service: General    CO SLCTV CATHJ 3RD+ ORD SLCTV ABDL PEL/LXTR BRNCH Left 9/29/2023    Procedure: Left leg angiogram with intervention;  Surgeon: Michelle Galvan MD;  Location: [facility] MA[facility];  Service: Vascular    CO SLCTV CATHJ 3RD+ ORD SLCTV ABDL PEL/LXTR BRNCH Left 2/26/2024    Procedure: Left leg angiogram antegrade access, left popliteal angioplasty;  Surgeon: Michelle Galvan MD;  Location: [facility];  Service: Vascular    ROTATOR CUFF REPAIR Right     SPINAL CORD STIMULATOR IMPLANT      \"Medtronic interstim model # 3058- in lower back to control bowel movements-currently turned off-battery is dead\"    TOE AMPUTATION Right 10/28/2016    Procedure: 3RD TOE " AMPUTATION ;  Surgeon: Anjel Salas DPM;  Location: [facility];  Service:        ALLERGIES:  No Known Allergies    SOCIAL HISTORY:  Social History     Substance and Sexual Activity   Alcohol Use Never     Social History     Substance and Sexual Activity   Drug Use No     Social History     Tobacco Use   Smoking Status Never   Smokeless Tobacco Never       FAMILY HISTORY:  Family History   Problem Relation Age of Onset    Leukemia Mother     Liver disease Mother     Lung cancer Mother         heavy smoker - 3 ppd    Heart disease Father     Liver disease Father     Diabetes type I Father     Multiple myeloma Sister     Breast cancer Sister     Urolithiasis Family     Alcohol abuse Neg Hx     Depression Neg Hx     Drug abuse Neg Hx     Substance Abuse Neg Hx     Mental illness Neg Hx        MEDICATIONS:    Current Facility-Administered Medications:     acetaminophen (TYLENOL) tablet 650 mg, 650 mg, Oral, Q6H PRN, Catia Mccauley MD    apixaban (ELIQUIS) tablet 5 mg, 5 mg, Oral, BID, Catia Mccauley MD    atorvastatin (LIPITOR) tablet 40 mg, 40 mg, Oral, Daily With Dinner, Catia Mccauley MD    calcitriol (ROCALTROL) capsule 0.5 mcg, 0.5 mcg, Oral, Once per day on Monday Wednesday Friday, Catia Mccauley MD    cinacalcet (SENSIPAR) tablet 120 mg, 120 mg, Oral, Once per day on Monday Wednesday Friday, Catia Mccauley MD    divalproex sodium (DEPAKOTE SPRINKLE) capsule 250 mg, 250 mg, Oral, Q12H MICH, Catia Mccauley MD    insulin lispro (HumALOG/ADMELOG) 100 units/mL subcutaneous injection 1-6 Units, 1-6 Units, Subcutaneous, 4x Daily (AC & HS) **AND** Fingerstick Glucose (POCT), , , 4x Daily AC and at bedtime, Catia Mccauley MD    metoprolol succinate (TOPROL-XL) 24 hr tablet 50 mg, 50 mg, Oral, BID, Catia Mccauley MD    prasugrel (EFFIENT) tablet 5 mg, 5 mg, Oral, Daily, Catia Mccauley MD    sacubitril-valsartan (ENTRESTO) 24-26 MG per tablet 1 tablet, 1 tablet, Oral, Daily, Catia Mccauley MD    trimethobenzamide (TIGAN) IM  injection 200 mg, 200 mg, Intramuscular, Q6H PRN, Catia Mccauley MD    Current Outpatient Medications:     apixaban (ELIQUIS) 5 mg, Take 1 tablet (5 mg total) by mouth 2 (two) times a day, Disp: 60 tablet, Rfl: 0    atorvastatin (LIPITOR) 40 mg tablet, TAKE 1 TABLET BY MOUTH DAILY WITH DINNER, Disp: 90 tablet, Rfl: 1    Blood Glucose Monitoring Suppl (True Metrix Meter) w/Device KIT, Use to test blood sugars 3 times daily, Disp: 1 kit, Rfl: 0    Blood Pressure Monitoring (BLOOD PRESSURE CUFF) MISC, Use to check blood pressure before taking blood pressure medication and 1 hour after and follow instructions provided in discharge instructions based on the readings., Disp: 1 each, Rfl: 0    calcitriol (ROCALTROL) 0.5 MCG capsule, Take 1 capsule (0.5 mcg total) by mouth 3 (three) times a week, Disp: 12 capsule, Rfl: 0    cinacalcet (SENSIPAR) 60 MG tablet, Take 2 tablets (120 mg total) by mouth 3 (three) times a week, Disp: 24 tablet, Rfl: 0    divalproex sodium (DEPAKOTE SPRINKLE) 125 MG capsule, TAKE 2 CAPSULES (250 MG TOTAL) BY MOUTH EVERY 12 (TWELVE) HOURS, Disp: 360 capsule, Rfl: 1    glucose blood (True Metrix Blood Glucose Test) test strip, Use 1 each 3 (three) times a day Use as instructed, Disp: 300 strip, Rfl: 1    Lancets MISC, Use 3 (three) times a day Use 3 times daily, Disp: 300 each, Rfl: 0    metoprolol succinate (TOPROL-XL) 50 mg 24 hr tablet, Take 1 tablet (50 mg total) by mouth 2 (two) times a day, Disp: 60 tablet, Rfl: 0    prasugrel (EFFIENT) tablet, Take 1 tablet (5 mg total) by mouth daily, Disp: 90 tablet, Rfl: 1    sacubitril-valsartan (Entresto) 24-26 MG TABS, Take 1 tablet by mouth in the morning, Disp: 30 tablet, Rfl: 0    Sevelamer Carbonate 2.4 g PACK, VIGOROUSLY MIX CONTENTS OF 1 PACKET IN WATER (IT WILL NOT DISSOLVE) AND DRINK 3 TIMES DAILY WITH MEALS, Disp: , Rfl:     Vitamin D3 1.25 MG (85432 UT) capsule, TAKE 1 CAPSULE BY MOUTH ONE TIME PER WEEK, Disp: 12 capsule, Rfl: 4    REVIEW OF  SYSTEMS:  Review of Systems   Constitutional:  Negative for chills and fever.   HENT:  Negative for ear pain and sore throat.    Eyes:  Negative for pain and visual disturbance.   Respiratory:  Negative for cough and shortness of breath.    Cardiovascular:  Negative for chest pain and palpitations.   Gastrointestinal:  Negative for abdominal pain and vomiting.   Genitourinary:  Negative for dysuria and hematuria.   Musculoskeletal:  Negative for arthralgias and back pain.   Skin:  Negative for color change and rash.   Neurological:  Negative for seizures and syncope.   All other systems reviewed and are negative.    PHYSICAL EXAM:  Current Weight: Weight - Scale: 103 kg (227 lb 15.3 oz)  First Weight: Weight - Scale: 103 kg (227 lb 15.3 oz)  Vitals:    07/17/24 1000 07/17/24 1015 07/17/24 1100 07/17/24 1115   BP: 107/59 129/56 109/54 106/55   Pulse: 74 74 71 71   Resp:  18     Temp:       TempSrc:       SpO2: 99% 97% 97% 96%   Weight:         No intake or output data in the 24 hours ending 07/17/24 1127  Physical Exam  Constitutional:       Appearance: Normal appearance.   HENT:      Head: Normocephalic and atraumatic.   Cardiovascular:      Rate and Rhythm: Normal rate and regular rhythm.      Pulses: Normal pulses.      Heart sounds: Normal heart sounds.      Comments: Left upper extremity AV fistula with aneurysmal dilatation  Positive thrill and bruit  Pulmonary:      Effort: Pulmonary effort is normal.      Breath sounds: Normal breath sounds.   Abdominal:      Palpations: Abdomen is soft.   Musculoskeletal:         General: Normal range of motion.      Right lower leg: No edema.      Left lower leg: No edema.   Skin:     General: Skin is warm.   Neurological:      Mental Status: He is alert and oriented to person, place, and time. Mental status is at baseline.   Psychiatric:         Mood and Affect: Mood normal.          Lab Results:   Results from last 7 days   Lab Units 07/17/24  0928 07/17/24  0959  "07/16/24  0445 07/15/24  0623 07/15/24  0450 07/14/24  1901   WBC Thousand/uL  --  5.23  --   --  7.39 6.20   HEMOGLOBIN g/dL  --  8.7*  --   --  9.3* 9.8*   I STAT HEMOGLOBIN g/dl 8.5*  --   --   --   --   --    HEMATOCRIT %  --  27.7*  --   --  29.0* 31.6*   HEMATOCRIT, ISTAT % 25*  --   --   --   --   --    PLATELETS Thousands/uL  --  119*  --   --  136* 144*   POTASSIUM mmol/L  --  5.0 5.0 6.0*  --  4.9   CHLORIDE mmol/L  --  96 96 96  --  94*   CO2 mmol/L  --  27 30 23  --  28   CO2, I-STAT mmol/L 30  --   --   --   --   --    BUN mg/dL  --  50* 31* 70*  --  65*   CREATININE mg/dL  --  7.54* 5.49* 8.75*  --  8.33*   CALCIUM mg/dL  --  8.3* 8.6 8.3*  --  8.9   MAGNESIUM mg/dL  --   --   --  2.1  --   --    PHOSPHORUS mg/dL  --   --   --  5.9*  --   --    ALK PHOS U/L  --   --   --  99  --  115*   ALT U/L  --   --   --  6*  --  8   AST U/L  --   --   --  6*  --  5*   GLUCOSE, ISTAT mg/dl 96  --   --   --   --   --      Portions of the record may have been created with voice recognition software. Occasional wrong word or \"sound a like\" substitutions may have occurred due to the inherent limitations of voice recognition software. Read the chart carefully and recognize, using context, where substitutions have occurred. If you have any questions, please contact the dictating provider.    "

## 2024-07-17 NOTE — ASSESSMENT & PLAN NOTE
Lab Results   Component Value Date    HGBA1C 5.9 (H) 07/14/2024       Recent Labs     07/15/24  1515 07/15/24  2133 07/16/24  0718 07/16/24  1121   POCGLU 97 177* 104 118         Blood Sugar Average: Last 72 hrs:  (P) [id number] home medication regimen, controlled with diet  HbgA1c  Regular diet  PCOT glucose checks before meals and bedtime  If blood sugars persistently >200, order sliding scale insulin  Goal -180 while admitted, adjusting insulin regimen as appropriate  Monitor for hypoglycemia and treat per protocol

## 2024-07-18 ENCOUNTER — APPOINTMENT (OUTPATIENT)
Dept: NON INVASIVE DIAGNOSTICS | Facility: HOSPITAL | Age: 64
DRG: 555 | End: 2024-07-18
Payer: MEDICARE

## 2024-07-18 ENCOUNTER — APPOINTMENT (OUTPATIENT)
Dept: DIALYSIS | Facility: HOSPITAL | Age: 64
DRG: 555 | End: 2024-07-18
Payer: MEDICARE

## 2024-07-18 LAB
ANION GAP SERPL CALCULATED.3IONS-SCNC: 12 MMOL/L (ref 4–13)
AORTIC ROOT: 3.8 CM
AORTIC VALVE MEAN VELOCITY: 18.5 M/S
APICAL FOUR CHAMBER EJECTION FRACTION: 46 %
ASCENDING AORTA: 4.4 CM
AV AREA BY CONTINUOUS VTI: 1.6 CM2
AV AREA PEAK VELOCITY: 1.4 CM2
AV LVOT MEAN GRADIENT: 1 MMHG
AV LVOT PEAK GRADIENT: 2 MMHG
AV MEAN GRADIENT: 15 MMHG
AV PEAK GRADIENT: 25 MMHG
AV VALVE AREA: 1.21 CM2
AV VELOCITY RATIO: 0.28
BASOPHILS # BLD AUTO: 0.01 THOUSANDS/ÂΜL (ref 0–0.1)
BASOPHILS NFR BLD AUTO: 0 % (ref 0–1)
BSA FOR ECHO PROCEDURE: 2.16 M2
BUN SERPL-MCNC: 59 MG/DL (ref 5–25)
CALCIUM SERPL-MCNC: 7.8 MG/DL (ref 8.4–10.2)
CHLORIDE SERPL-SCNC: 97 MMOL/L (ref 96–108)
CO2 SERPL-SCNC: 24 MMOL/L (ref 21–32)
CREAT SERPL-MCNC: 8.77 MG/DL (ref 0.6–1.3)
DOP CALC AO PEAK VEL: 2.52 M/S
DOP CALC AO VTI: 55.68 CM
DOP CALC LVOT AREA: 3.8 CM2
DOP CALC LVOT CARDIAC INDEX: 2.84 L/MIN/M2
DOP CALC LVOT CARDIAC OUTPUT: 6.14 L/MIN
DOP CALC LVOT DIAMETER: 2.2 CM
DOP CALC LVOT PEAK VEL VTI: 17.72 CM
DOP CALC LVOT PEAK VEL: 0.7 M/S
DOP CALC LVOT STROKE INDEX: 39.8 ML/M2
DOP CALC LVOT STROKE VOLUME: 67.33 CM3
E WAVE DECELERATION TIME: 161 MS
E/A RATIO: 1.01
EOSINOPHIL # BLD AUTO: 0.1 THOUSAND/ÂΜL (ref 0–0.61)
EOSINOPHIL NFR BLD AUTO: 3 % (ref 0–6)
ERYTHROCYTE [DISTWIDTH] IN BLOOD BY AUTOMATED COUNT: 16 % (ref 11.6–15.1)
FRACTIONAL SHORTENING: 27 (ref 28–44)
GFR SERPL CREATININE-BSD FRML MDRD: 5 ML/MIN/1.73SQ M
GLUCOSE SERPL-MCNC: 123 MG/DL (ref 65–140)
GLUCOSE SERPL-MCNC: 166 MG/DL (ref 65–140)
GLUCOSE SERPL-MCNC: 89 MG/DL (ref 65–140)
GLUCOSE SERPL-MCNC: 91 MG/DL (ref 65–140)
GLUCOSE SERPL-MCNC: 92 MG/DL (ref 65–140)
HCT VFR BLD AUTO: 25.5 % (ref 36.5–49.3)
HGB BLD-MCNC: 7.9 G/DL (ref 12–17)
IMM GRANULOCYTES # BLD AUTO: 0.05 THOUSAND/UL (ref 0–0.2)
IMM GRANULOCYTES NFR BLD AUTO: 1 % (ref 0–2)
INTERVENTRICULAR SEPTUM IN DIASTOLE (PARASTERNAL SHORT AXIS VIEW): 1.5 CM
INTERVENTRICULAR SEPTUM: 1.5 CM (ref 0.6–1.1)
LAAS-AP2: 14.2 CM2
LAAS-AP4: 20.2 CM2
LEFT ATRIUM SIZE: 5.5 CM
LEFT ATRIUM VOLUME (MOD BIPLANE): 47 ML
LEFT ATRIUM VOLUME INDEX (MOD BIPLANE): 21.8 ML/M2
LEFT INTERNAL DIMENSION IN SYSTOLE: 4.6 CM (ref 2.1–4)
LEFT VENTRICULAR INTERNAL DIMENSION IN DIASTOLE: 6.3 CM (ref 3.5–6)
LEFT VENTRICULAR POSTERIOR WALL IN END DIASTOLE: 1.6 CM
LEFT VENTRICULAR STROKE VOLUME: 102 ML
LVSV (TEICH): 102 ML
LYMPHOCYTES # BLD AUTO: 0.6 THOUSANDS/ÂΜL (ref 0.6–4.47)
LYMPHOCYTES NFR BLD AUTO: 15 % (ref 14–44)
MAGNESIUM SERPL-MCNC: 2 MG/DL (ref 1.9–2.7)
MCH RBC QN AUTO: 31 PG (ref 26.8–34.3)
MCHC RBC AUTO-ENTMCNC: 31 G/DL (ref 31.4–37.4)
MCV RBC AUTO: 100 FL (ref 82–98)
MONOCYTES # BLD AUTO: 0.35 THOUSAND/ÂΜL (ref 0.17–1.22)
MONOCYTES NFR BLD AUTO: 9 % (ref 4–12)
MV E'TISSUE VEL-LAT: 15 CM/S
MV E'TISSUE VEL-SEP: 14 CM/S
MV PEAK A VEL: 0.92 M/S
MV PEAK E VEL: 93 CM/S
MV STENOSIS PRESSURE HALF TIME: 47 MS
MV VALVE AREA P 1/2 METHOD: 4.68
NEUTROPHILS # BLD AUTO: 2.78 THOUSANDS/ÂΜL (ref 1.85–7.62)
NEUTS SEG NFR BLD AUTO: 72 % (ref 43–75)
NRBC BLD AUTO-RTO: 0 /100 WBCS
PLATELET # BLD AUTO: 111 THOUSANDS/UL (ref 149–390)
PMV BLD AUTO: 10.9 FL (ref 8.9–12.7)
POTASSIUM SERPL-SCNC: 4.8 MMOL/L (ref 3.5–5.3)
RA PRESSURE ESTIMATED: 3 MMHG
RBC # BLD AUTO: 2.55 MILLION/UL (ref 3.88–5.62)
RIGHT ATRIUM AREA SYSTOLE A4C: 18.9 CM2
RIGHT VENTRICLE ID DIMENSION: 4.4 CM
RV PSP: 35 MMHG
SINOTUBULAR JUNCTION: 3.1 CM
SL CV LEFT ATRIUM LENGTH A2C: 4.6 CM
SL CV LV EF: 45
SL CV PED ECHO LEFT VENTRICLE DIASTOLIC VOLUME (MOD BIPLANE) 2D: 200 ML
SL CV PED ECHO LEFT VENTRICLE SYSTOLIC VOLUME (MOD BIPLANE) 2D: 98 ML
SL CV SINUS OF VALSALVA 2D: 3.5 CM
SODIUM SERPL-SCNC: 133 MMOL/L (ref 135–147)
STJ: 3.1 CM
TR MAX PG: 32 MMHG
TR PEAK VELOCITY: 2.8 M/S
TRICUSPID ANNULAR PLANE SYSTOLIC EXCURSION: 2.1 CM
TRICUSPID VALVE PEAK REGURGITATION VELOCITY: 2.82 M/S
WBC # BLD AUTO: 3.89 THOUSAND/UL (ref 4.31–10.16)

## 2024-07-18 PROCEDURE — 87081 CULTURE SCREEN ONLY: CPT | Performed by: INTERNAL MEDICINE

## 2024-07-18 PROCEDURE — G0257 UNSCHED DIALYSIS ESRD PT HOS: HCPCS

## 2024-07-18 PROCEDURE — 99232 SBSQ HOSP IP/OBS MODERATE 35: CPT | Performed by: INTERNAL MEDICINE

## 2024-07-18 PROCEDURE — 5A1D70Z PERFORMANCE OF URINARY FILTRATION, INTERMITTENT, LESS THAN 6 HOURS PER DAY: ICD-10-PCS | Performed by: INTERNAL MEDICINE

## 2024-07-18 PROCEDURE — 99232 SBSQ HOSP IP/OBS MODERATE 35: CPT

## 2024-07-18 PROCEDURE — 99232 SBSQ HOSP IP/OBS MODERATE 35: CPT | Performed by: PHYSICIAN ASSISTANT

## 2024-07-18 PROCEDURE — 90935 HEMODIALYSIS ONE EVALUATION: CPT | Performed by: NURSE PRACTITIONER

## 2024-07-18 PROCEDURE — 82948 REAGENT STRIP/BLOOD GLUCOSE: CPT

## 2024-07-18 PROCEDURE — C8929 TTE W OR WO FOL WCON,DOPPLER: HCPCS

## 2024-07-18 PROCEDURE — 80048 BASIC METABOLIC PNL TOTAL CA: CPT | Performed by: INTERNAL MEDICINE

## 2024-07-18 PROCEDURE — 83735 ASSAY OF MAGNESIUM: CPT | Performed by: INTERNAL MEDICINE

## 2024-07-18 PROCEDURE — 93306 TTE W/DOPPLER COMPLETE: CPT | Performed by: INTERNAL MEDICINE

## 2024-07-18 PROCEDURE — 85025 COMPLETE CBC W/AUTO DIFF WBC: CPT | Performed by: INTERNAL MEDICINE

## 2024-07-18 RX ORDER — METOPROLOL TARTRATE 50 MG/1
50 TABLET, FILM COATED ORAL EVERY 12 HOURS SCHEDULED
Status: DISCONTINUED | OUTPATIENT
Start: 2024-07-18 | End: 2024-07-21 | Stop reason: HOSPADM

## 2024-07-18 RX ADMIN — HYDROMORPHONE HYDROCHLORIDE 0.3 MG: 1 INJECTION, SOLUTION INTRAMUSCULAR; INTRAVENOUS; SUBCUTANEOUS at 11:37

## 2024-07-18 RX ADMIN — APIXABAN 5 MG: 5 TABLET, FILM COATED ORAL at 13:03

## 2024-07-18 RX ADMIN — METOPROLOL SUCCINATE 50 MG: 50 TABLET, EXTENDED RELEASE ORAL at 13:02

## 2024-07-18 RX ADMIN — APIXABAN 5 MG: 5 TABLET, FILM COATED ORAL at 17:40

## 2024-07-18 RX ADMIN — DIVALPROEX SODIUM 125 MG: 125 CAPSULE ORAL at 21:45

## 2024-07-18 RX ADMIN — SACUBITRIL AND VALSARTAN 1 TABLET: 24; 26 TABLET, FILM COATED ORAL at 17:40

## 2024-07-18 RX ADMIN — DIVALPROEX SODIUM 125 MG: 125 CAPSULE ORAL at 13:01

## 2024-07-18 RX ADMIN — PERFLUTREN 0.4 ML/MIN: 6.52 INJECTION, SUSPENSION INTRAVENOUS at 14:37

## 2024-07-18 RX ADMIN — PRASUGREL 5 MG: 10 TABLET, FILM COATED ORAL at 14:46

## 2024-07-18 RX ADMIN — OXYCODONE HYDROCHLORIDE 5 MG: 5 TABLET ORAL at 10:00

## 2024-07-18 RX ADMIN — ATORVASTATIN CALCIUM 40 MG: 40 TABLET, FILM COATED ORAL at 17:40

## 2024-07-18 NOTE — PROGRESS NOTES
General Cardiology   Progress Note -  Team One   Asad Galloway 64 y.o. male MRN: [id number]    Unit/Bed#: S -01 Encounter: [id number]    Assessment/ Plan    Fall   Patient and wife report mechanical fall and note no LOC   He denies dizziness, lightheaded, chest pain or SOB prior or after fall  He reports no syncope or history of syncope      2. Ischemic cardiomyopathy   EF 40% on echocardiogram from 12/2023  On GDMT: metoprolol XL and Entresto   Euvolemic on exam. Volume status managed by HD      3. MVCAD   s/p PCI of OM 5/2022 and PCI of LAD and LCx 6/2022  On Effient, atorvastatin and metoprolol XL   No cardiac complaints      4. Valvular disease   Echocardiogram 12/2023 showed EF 40%, grade I diastolic dysfunction, akinetic apex, hypokinetic mid inferior, mid inferolateral, apical anterior, apical inferior and apical lateral, mild to moderate AI, moderate to severe AS and moderate MR.   Echocardiogram pending   Follow up as outpatient      5. ESRD on HD   Followed by  nephrology   Outpatient HD schedule M/W/F   Currently getting dialyzed      6. Hypertension   Average /58  On metoprolol XL 50 mg PO BID and entresto 24/26 mg PO daily      7. Hyperlipidemia   LDL 21  On atorvastatin 40 mg PO daily      8. Type II diabetes   Hemoglobin A1C 5.9  On SSI      9. History of CVA   On effient and atorvastatin      10. R peroneal DVT   Followed by primary team   On Eliquis 5 mg PO BID        Subjective  Patient is reporting significant RLE pain. He offers no cardiac complaints including chest pain, SOB or palpitations.     Review of Systems   Constitutional: Negative for chills and fever.   HENT:  Negative for congestion.    Cardiovascular:  Negative for chest pain, dyspnea on exertion, leg swelling, near-syncope, palpitations and syncope.   Respiratory:  Negative for shortness of breath.    Musculoskeletal:  Negative for falls.        RLE pain    Gastrointestinal:  Negative for bloating, nausea and vomiting.    Neurological:  Negative for dizziness and light-headedness.   Psychiatric/Behavioral:  Negative for altered mental status.    All other systems reviewed and are negative.      Objective:   Vitals: Blood pressure 124/66, pulse 68, temperature (!) 97.4 °F (36.3 °C), resp. rate 16, weight 103 kg (227 lb 15.3 oz), SpO2 100%.,       Body mass index is 34.66 kg/m².,     Systolic (24hrs), Av , Min:96 , Max:129     Diastolic (24hrs), Av, Min:51, Max:72      Intake/Output Summary (Last 24 hours) at 2024 0937  Last data filed at 2024 0825  Gross per 24 hour   Intake 320 ml   Output --   Net 320 ml     Weight (last 2 days)       Date/Time Weight    24 09:11:33 103 (227.96)          Telemetry Review: Normal sinus rhythm HR 70s    Physical Exam  Constitutional:       General: He is not in acute distress.     Appearance: He is obese.   HENT:      Head: Normocephalic.      Mouth/Throat:      Mouth: Mucous membranes are moist.   Cardiovascular:      Rate and Rhythm: Normal rate and regular rhythm.      Pulses: Normal pulses.      Heart sounds: Murmur heard.   Pulmonary:      Effort: Pulmonary effort is normal. No respiratory distress.      Breath sounds: Normal breath sounds.   Abdominal:      General: Bowel sounds are normal.      Palpations: Abdomen is soft.   Musculoskeletal:         General: No swelling.      Cervical back: Neck supple.   Skin:     General: Skin is warm and dry.      Capillary Refill: Capillary refill takes less than 2 seconds.   Neurological:      Mental Status: He is alert and oriented to person, place, and time.   Psychiatric:         Mood and Affect: Mood normal.         LABORATORY RESULTS      CBC with diff:   Results from last 7 days   Lab Units 24  0838 24  0928 24  0926 07/15/24  0450 24  1901   WBC Thousand/uL 3.89*  --  5.23 7.39 6.20   HEMOGLOBIN g/dL 7.9*  --  8.7* 9.3* 9.8*   I STAT HEMOGLOBIN g/dl  --  8.5*  --   --   --    HEMATOCRIT % 25.5*  --   27.7* 29.0* 31.6*   HEMATOCRIT, ISTAT %  --  25*  --   --   --    MCV fL 100*  --  101* 99* 100*   PLATELETS Thousands/uL 111*  --  119* 136* 144*   RBC Million/uL 2.55*  --  2.74* 2.94* 3.15*   MCH pg 31.0  --  31.8 31.6 31.1   MCHC g/dL 31.0*  --  31.4 32.1 31.0*   RDW % 16.0*  --  15.9* 15.8* 15.6*   MPV fL 10.9  --  10.3 11.0 10.1   NRBC AUTO /100 WBCs 0  --  0 0 0       CMP:  Results from last 7 days   Lab Units 07/18/24  0838 07/17/24  0928 07/17/24  0926 07/16/24  0445 07/15/24  0623 07/14/24  1901   POTASSIUM mmol/L 4.8  --  5.0 5.0 6.0* 4.9   CHLORIDE mmol/L 97  --  96 96 96 94*   CO2 mmol/L 24  --  27 30 23 28   CO2, I-STAT mmol/L  --  30  --   --   --   --    BUN mg/dL 59*  --  50* 31* 70* 65*   CREATININE mg/dL 8.77*  --  7.54* 5.49* 8.75* 8.33*   GLUCOSE, ISTAT mg/dl  --  96  --   --   --   --    CALCIUM mg/dL 7.8*  --  8.3* 8.6 8.3* 8.9   AST U/L  --   --   --   --  6* 5*   ALT U/L  --   --   --   --  6* 8   ALK PHOS U/L  --   --   --   --  99 115*   EGFR ml/min/1.73sq m 5  --  6 10 5 6       BMP:  Results from last 7 days   Lab Units 07/18/24  0838 07/17/24  0928 07/17/24  0926 07/16/24  0445 07/15/24  0623 07/14/24  1901   POTASSIUM mmol/L 4.8  --  5.0 5.0 6.0* 4.9   CHLORIDE mmol/L 97  --  96 96 96 94*   CO2 mmol/L 24  --  27 30 23 28   CO2, I-STAT mmol/L  --  30  --   --   --   --    BUN mg/dL 59*  --  50* 31* 70* 65*   CREATININE mg/dL 8.77*  --  7.54* 5.49* 8.75* 8.33*   GLUCOSE, ISTAT mg/dl  --  96  --   --   --   --    CALCIUM mg/dL 7.8*  --  8.3* 8.6 8.3* 8.9       Lab Results   Component Value Date    NTBNP 17,967 (H) 02/11/2023    NTBNP 1,859 (H) 01/02/2022    NTBNP 1,259 (H) 12/30/2020             Results from last 7 days   Lab Units 07/18/24  0838 07/15/24  0623   MAGNESIUM mg/dL 2.0 2.1          Results from last 7 days   Lab Units 07/14/24  1901   HEMOGLOBIN A1C % 5.9*          Results from last 7 days   Lab Units 07/17/24  0926   TSH 3RD GENERATON uIU/mL 1.563       Results from last 7  days   Lab Units 07/14/24  1901   INR  1.01       Lipid Profile:   Lab Results   Component Value Date    CHOL 203 (H) 08/10/2016     Lab Results   Component Value Date    HDL 30 (L) 01/30/2023    HDL 33 (L) 07/25/2022    HDL 31 (L) 06/20/2022     Lab Results   Component Value Date    LDLCALC 21 01/30/2023    LDLCALC 19 07/25/2022    LDLCALC 25 06/20/2022     Lab Results   Component Value Date    TRIG 123 01/30/2023    TRIG 151 (H) 07/25/2022    TRIG 182 (H) 06/20/2022       Cardiac testing:   No results found for this or any previous visit.    No results found for this or any previous visit.    No results found for this or any previous visit.    No valid procedures specified.  No results found for this or any previous visit.        Meds/Allergies   all current active meds have been reviewed and current meds:   Current Facility-Administered Medications   Medication Dose Route Frequency    acetaminophen (TYLENOL) tablet 650 mg  650 mg Oral Q6H PRN    apixaban (ELIQUIS) tablet 5 mg  5 mg Oral BID    atorvastatin (LIPITOR) tablet 40 mg  40 mg Oral Daily With Dinner    calcitriol (ROCALTROL) capsule 0.5 mcg  0.5 mcg Oral Once per day on Monday Wednesday Friday    cinacalcet (SENSIPAR) tablet 120 mg  120 mg Oral Once per day on Monday Wednesday Friday    divalproex sodium (DEPAKOTE SPRINKLE) capsule 125 mg  125 mg Oral Q12H MICH    HYDROmorphone (DILAUDID) injection 0.3 mg  0.3 mg Intravenous Q4H PRN    insulin lispro (HumALOG/ADMELOG) 100 units/mL subcutaneous injection 1-6 Units  1-6 Units Subcutaneous 4x Daily (AC & HS)    meclizine (ANTIVERT) tablet 12.5 mg  12.5 mg Oral Q8H PRN    metoprolol succinate (TOPROL-XL) 24 hr tablet 50 mg  50 mg Oral BID    oxyCODONE (ROXICODONE) IR tablet 5 mg  5 mg Oral Q6H PRN    prasugrel (EFFIENT) tablet 5 mg  5 mg Oral Daily    sacubitril-valsartan (ENTRESTO) 24-26 MG per tablet 1 tablet  1 tablet Oral Daily    trimethobenzamide (TIGAN) IM injection 200 mg  200 mg Intramuscular Q6H PRN        Medications Prior to Admission:     apixaban (ELIQUIS) 5 mg    atorvastatin (LIPITOR) 40 mg tablet    Blood Glucose Monitoring Suppl (True Metrix Meter) w/Device KIT    Blood Pressure Monitoring (BLOOD PRESSURE CUFF) MISC    calcitriol (ROCALTROL) 0.5 MCG capsule    cinacalcet (SENSIPAR) 60 MG tablet    divalproex sodium (DEPAKOTE SPRINKLE) 125 MG capsule    glucose blood (True Metrix Blood Glucose Test) test strip    Lancets MISC    metoprolol succinate (TOPROL-XL) 50 mg 24 hr tablet    prasugrel (EFFIENT) tablet    sacubitril-valsartan (Entresto) 24-26 MG TABS    Sevelamer Carbonate 2.4 g PACK    Vitamin D3 1.25 MG (36596 UT) capsule        Counseling / Coordination of Care  Total floor / unit time spent today 20 minutes.  Greater than 50% of total time was spent with the patient and / or family counseling and / or coordination of care.      ** Please Note: Dragon 360 Dictation voice to text software may have been used in the creation of this document. **   No Anticipated Discharge Needs

## 2024-07-18 NOTE — ASSESSMENT & PLAN NOTE
QT of 486 on EKG (QTc of 516)  Limit/avoid QT prolonging agents as possible  Chronically elevated looking back for more than a year  Repeat EKG if patient complains of chest pain

## 2024-07-18 NOTE — PROGRESS NOTES
NEPHROLOGY HOSPITAL PROGRESS NOTE   Asad Galloway 64 y.o. male MRN: [id number]  Unit/Bed#: S -01 Encounter: [id number]  Reason for Consult: ESRD on hemodialysis    ASSESSMENT and PLAN:  64-year-old male who presented with fall less than 24 hours after hospital discharge.  Nephrology was consulted for management of ESRD/hemodialysis.    ESRD on hemodialysis:  In center hemodialysis at [facility]  Treatment days Monday, Wednesday, Friday  Access: Left upper extremity AV fistula with good bruit and thrill  Target weight 94.6 kg  Unable to get an accurate weight with the bed scale and patient is unable to stand today.  Complaining of significant right thigh pain.  No apparent injury noted.  No ecchymosis.  Going for 3 L volume removal.    HEMODIALYSIS PROCEDURE NOTE  The patient was seen and examined on hemodialysis.  Time: 4 hours  Sodium: 138 Blood flow: 450   Dialyzer: F160 Potassium: 2 Dialysate flow: 800   Access: avf Bicarbonate: 35 Ultrafiltration goal: 3L   Medications on HD: weekly venofer-- dose today        Hyperkalemia:  2K bath on dialysis today    Hyponatremia:  Mild  Volume mediated/lack of free water excretion  Continue fluid restriction, volume management on hemodialysis    Hypertension/volume status:  Management: On Toprol-XL 50 mg twice a day, Entresto 24-26 mg daily  Blood pressure acceptable    ESRD MBD/secondary hyperparathyroidism of renal origin:  Continue Sensipar and calcitriol  Outpatient monitoring    ESRD anemia:  Hemoglobin 8.7 on admission.  Hemoglobin has been drifting down in the setting of recent hospitalization.  May be somewhat volume mediated.  Hemoglobin 7.9.  Below target.  Trauma workup negative.  Target hemoglobin 10-11  At outpatient dialysis receives Venofer 50 mg weekly.    Last outpatient (at [facility]) hemoglobin at target  Outpatient monitoring    Right peroneal DVT: Diagnosed on recent admission.  On Eliquis 5 mg twice a day which is appropriately dosed for ESRD.    PAD  with left heel tissue loss: Following with vascular surgery.  Local wound care    Ischemic cardiomyopathy:  No evidence of decompensation  Previously was on LifeVest.  Ejection fraction previously 25 to 30% improved to 40%.  X-ray no acute cardiopulmonary disease    Fall:   Evaluated by cardiology who feels that it was a nonsyncopal event and does not require any further cardiology workup.  Evaluated by trauma with no injury noted      PLAN SUMMARY:  Hemodialysis today.  Dose of Venofer on hemodialysis  Stable from a renal standpoint    SUBJECTIVE / 24H INTERVAL HISTORY:  Complaining of right thigh pain which is somewhat episodic and related to position/movement.  No other complaints.  Denies head pain.    OBJECTIVE:  Current Weight: Weight - Scale: 103 kg (227 lb 15.3 oz)  Vitals:    07/18/24 0704 07/18/24 0825 07/18/24 0830 07/18/24 0900   BP: 120/72 115/64 113/62 124/66   Pulse: 82 73 71 68   Resp: 17 16 18 16   Temp: (!) 97.4 °F (36.3 °C)      TempSrc:       SpO2: 95% 99% 99% 100%   Weight:           Intake/Output Summary (Last 24 hours) at 7/18/2024 0926  Last data filed at 7/18/2024 0825  Gross per 24 hour   Intake 320 ml   Output --   Net 320 ml     General: NAD, lying in bed quietly with intermittent grimaces related to thigh pain right leg.  Skin: no rash  Eyes: anicteric sclera  ENT: moist mucous membrane  Neck: supple  Chest: CTA b/l, no ronchii, no wheeze, no rubs, no rales.  Normal effort  CVS: s1s2, no murmur, no gallop, no rub.  Normal rate regular rhythm  Abdomen: soft, nontender, nl sounds.  Nondistended  Extremities: no edema LE b/l.  Several dry cuts on his right anterior leg-lower area  : no chávez  Neuro: AAOX3  Psych: normal affect   Medications:    Current Facility-Administered Medications:     acetaminophen (TYLENOL) tablet 650 mg, 650 mg, Oral, Q6H PRN, Catia Mccauley MD, 650 mg at 07/17/24 1406    apixaban (ELIQUIS) tablet 5 mg, 5 mg, Oral, BID, Catia Mccauley MD, 5 mg at 07/17/24 2204     atorvastatin (LIPITOR) tablet 40 mg, 40 mg, Oral, Daily With Dinner, Catia Mccauley MD, 40 mg at 07/17/24 1802    calcitriol (ROCALTROL) capsule 0.5 mcg, 0.5 mcg, Oral, Once per day on Monday Wednesday Friday, Catia Mccauley MD, 0.5 mcg at 07/17/24 1331    cinacalcet (SENSIPAR) tablet 120 mg, 120 mg, Oral, Once per day on Monday Wednesday Friday, Catia Mccauley MD, 120 mg at 07/17/24 1331    divalproex sodium (DEPAKOTE SPRINKLE) capsule 125 mg, 125 mg, Oral, Q12H MICH, Catia Mccauley MD, 125 mg at 07/17/24 2204    HYDROmorphone (DILAUDID) injection 0.3 mg, 0.3 mg, Intravenous, Q4H PRN, Catia Mccauley MD, 0.3 mg at 07/17/24 1902    insulin lispro (HumALOG/ADMELOG) 100 units/mL subcutaneous injection 1-6 Units, 1-6 Units, Subcutaneous, 4x Daily (AC & HS) **AND** Fingerstick Glucose (POCT), , , 4x Daily AC and at bedtime, Catia Mccauley MD    meclizine (ANTIVERT) tablet 12.5 mg, 12.5 mg, Oral, Q8H PRN, Catia Mccauley MD, 12.5 mg at 07/17/24 1230    metoprolol succinate (TOPROL-XL) 24 hr tablet 50 mg, 50 mg, Oral, BID, Catia Mccauley MD    oxyCODONE (ROXICODONE) IR tablet 5 mg, 5 mg, Oral, Q6H PRN, Catia Mccauley MD, 5 mg at 07/17/24 1804    prasugrel (EFFIENT) tablet 5 mg, 5 mg, Oral, Daily, Catia Mccauley MD, 5 mg at 07/17/24 1406    sacubitril-valsartan (ENTRESTO) 24-26 MG per tablet 1 tablet, 1 tablet, Oral, Daily, Catia Mccauley MD    trimethobenzamide (TIGAN) IM injection 200 mg, 200 mg, Intramuscular, Q6H PRN, Catia Mccauley MD    Laboratory Results:  Results from last 7 days   Lab Units 07/18/24  0838 07/17/24  0928 07/17/24  0926 07/16/24  0445 07/15/24  0623 07/15/24  0450 07/14/24  1901   WBC Thousand/uL 3.89*  --  5.23  --   --  7.39 6.20   HEMOGLOBIN g/dL 7.9*  --  8.7*  --   --  9.3* 9.8*   I STAT HEMOGLOBIN g/dl  --  8.5*  --   --   --   --   --    HEMATOCRIT % 25.5*  --  27.7*  --   --  29.0* 31.6*   HEMATOCRIT, ISTAT %  --  25*  --   --   --   --   --    PLATELETS Thousands/uL 111*  --  119*  --   --  136* 144*    POTASSIUM mmol/L 4.8  --  5.0 5.0 6.0*  --  4.9   CHLORIDE mmol/L 97  --  96 96 96  --  94*   CO2 mmol/L 24  --  27 30 23  --  28   CO2, I-STAT mmol/L  --  30  --   --   --   --   --    BUN mg/dL 59*  --  50* 31* 70*  --  65*   CREATININE mg/dL 8.77*  --  7.54* 5.49* 8.75*  --  8.33*   CALCIUM mg/dL 7.8*  --  8.3* 8.6 8.3*  --  8.9   MAGNESIUM mg/dL 2.0  --   --   --  2.1  --   --    PHOSPHORUS mg/dL  --   --   --   --  5.9*  --   --    GLUCOSE, ISTAT mg/dl  --  96  --   --   --   --   --

## 2024-07-18 NOTE — PLAN OF CARE
Post-Dialysis RN Treatment Note    Blood Pressure:  Pre 115/64 mm/Hg  Post 154/92 mmHg   EDW  94.5 kg    Weight:  Pre Not Applicable kg   Post Not Applicable kg   Mode of weight measurement: Bed Scale   Volume Removed  4000 ml    Treatment duration 240 minutes    NS given  No    Treatment shortened? No   Medications given during Rx Not Applicable   Estimated Kt/V  Not Applicable   Access type: AV fistula   Access Issues: No    Report called to primary nurse   Yes / Lori Weir RN        UF as tolerated, 4 hrs, 2Kbath  Problem: METABOLIC, FLUID AND ELECTROLYTES - ADULT  Goal: Electrolytes maintained within normal limits  Description: INTERVENTIONS:  - Monitor labs and assess patient for signs and symptoms of electrolyte imbalances  - Administer electrolyte replacement as ordered  - Monitor response to electrolyte replacements, including repeat lab results as appropriate  - Instruct patient on fluid and nutrition as appropriate  Outcome: Progressing  Goal: Fluid balance maintained  Description: INTERVENTIONS:  - Monitor labs   - Monitor I/O and WT  - Instruct patient on fluid and nutrition as appropriate  - Assess for signs & symptoms of volume excess or deficit  Outcome: Progressing

## 2024-07-18 NOTE — PLAN OF CARE
Problem: CARDIOVASCULAR - ADULT  Goal: Maintains optimal cardiac output and hemodynamic stability  Description: INTERVENTIONS:  - Monitor I/O, vital signs and rhythm  - Monitor for S/S and trends of decreased cardiac output  - Administer and titrate ordered vasoactive medications to optimize hemodynamic stability  - Assess quality of pulses, skin color and temperature  - Assess for signs of decreased coronary artery perfusion  - Instruct patient to report change in severity of symptoms  Outcome: Progressing  Goal: Absence of cardiac dysrhythmias or at baseline rhythm  Description: INTERVENTIONS:  - Continuous cardiac monitoring, vital signs, obtain 12 lead EKG if ordered  - Administer antiarrhythmic and heart rate control medications as ordered  - Monitor electrolytes and administer replacement therapy as ordered  Outcome: Progressing     Problem: MUSCULOSKELETAL - ADULT  Goal: Maintain or return mobility to safest level of function  Description: INTERVENTIONS:  - Assess patient's ability to carry out ADLs; assess patient's baseline for ADL function and identify physical deficits which impact ability to perform ADLs (bathing, care of mouth/teeth, toileting, grooming, dressing, etc.)  - Assess/evaluate cause of self-care deficits   - Assess range of motion  - Assess patient's mobility  - Assess patient's need for assistive devices and provide as appropriate  - Encourage maximum independence but intervene and supervise when necessary  - Involve family in performance of ADLs  - Assess for home care needs following discharge   - Consider OT consult to assist with ADL evaluation and planning for discharge  - Provide patient education as appropriate  Outcome: Progressing  Goal: Maintain proper alignment of affected body part  Description: INTERVENTIONS:  - Support, maintain and protect limb and body alignment  - Provide patient/ family with appropriate education  Outcome: Progressing     Problem: SAFETY ADULT  Goal:  Patient will remain free of falls  Description: INTERVENTIONS:  - Educate patient/family on patient safety including physical limitations  - Instruct patient to call for assistance with activity   - Consult OT/PT to assist with strengthening/mobility   - Keep Call bell within reach  - Keep bed low and locked with side rails adjusted as appropriate  - Keep care items and personal belongings within reach  - Initiate and maintain comfort rounds  - Make Fall Risk Sign visible to staff  - Offer Toileting every 2 Hours, in advance of need  - Initiate/Maintain bed alarm  - Obtain necessary fall risk management equipment:   - Apply yellow socks and bracelet for high fall risk patients  - Consider moving patient to room near nurses station  Outcome: Progressing  Goal: Maintain or return to baseline ADL function  Description: INTERVENTIONS:  -  Assess patient's ability to carry out ADLs; assess patient's baseline for ADL function and identify physical deficits which impact ability to perform ADLs (bathing, care of mouth/teeth, toileting, grooming, dressing, etc.)  - Assess/evaluate cause of self-care deficits   - Assess range of motion  - Assess patient's mobility; develop plan if impaired  - Assess patient's need for assistive devices and provide as appropriate  - Encourage maximum independence but intervene and supervise when necessary  - Involve family in performance of ADLs  - Assess for home care needs following discharge   - Consider OT consult to assist with ADL evaluation and planning for discharge  - Provide patient education as appropriate  Outcome: Progressing

## 2024-07-18 NOTE — PHYSICAL THERAPY NOTE
Physical Therapy Cancellation Note       07/18/24 0859   Note Type   Note type Cancelled Session   Cancel Reasons Patient off floor/hemodialysis   Additional Comments referral received for PT eval and tx. attempted to see pt for eval but he is receiving dialysis at bedside. will follow and initiate PT as medically appropriate and schedule allows.     Jose G Felipe, PT

## 2024-07-18 NOTE — ASSESSMENT & PLAN NOTE
- s/p syncopal fall with head strike less than 24 hours after being discharged from the hospital   - No acute traumatic injuries noted on primary or secondary trauma exams  - No additional injuries noted on tertiary trauma exam  - Trauma team will sign off.

## 2024-07-18 NOTE — PROGRESS NOTES
[facility]  Progress Note  Name: Asad Galloway I  MRN: [id number]  Unit/Bed#: S -01 I Date of Admission: 7/17/2024   Date of Service: 7/18/2024 I Hospital Day: 0    Assessment & Plan   * Syncope and collapse  Assessment & Plan  - s/p syncopal fall with head strike less than 24 hours after being discharged from the hospital   - No acute traumatic injuries noted on primary or secondary trauma exams  - No additional injuries noted on tertiary trauma exam  - Trauma team will sign off.              TRAUMA TERTIARY SURVEY NOTE    VTE Prophylaxis: Eliquis    Disposition: Trauma team will sign off    Code status:  Level 1 - Full Code    Consultants: IP CONSULT TO NEPHROLOGY  IP CONSULT TO CARDIOLOGY    Subjective   Transfer from: home    Mechanism of Injury:Fall     Chief Complaint: no complaints    HPI/Last 24 hour events: Patient denies pain, reports he feels well, is hungry     Objective   Vitals:   Temp:  [97.5 °F (36.4 °C)-98.1 °F (36.7 °C)] 98.1 °F (36.7 °C)  HR:  [67-81] 72  Resp:  [18-20] 18  BP: ()/(48-65) 109/54    I/O         07/16 0701  07/17 0700 07/17 0701  07/18 0700    P.O.  120    Total Intake(mL/kg)  120 (1.2)    Net  +120                   Physical Exam:   Gen: No acute distress resting comfortably in bed  Neuro: AAOx3, GCS 15, no focal neurodeficit  HEENT: PERRLA, EOMI, mucous membranes moist  Cards: RRR, S1, S2 without murmur rub or gallop  Pulm: Clear to auscultation bilaterally without wheezes rales or rhonchi  GI: Soft, nontender, nondistended  : Voiding independently  MSK: Extremities nontender without deformity  Skin: Warm, dry, intact      Invasive Devices       Peripheral Intravenous Line  Duration             Peripheral IV 07/17/24 Right Antecubital <1 day              Line  Duration             Hemodialysis Access Left Upper arm -- days                            Lab Results: BMP/CMP:   Lab Results   Component Value Date    SODIUM 133 (L) 07/17/2024     K 5.0 07/17/2024    CL 96 07/17/2024    CO2 30 07/17/2024    CO2 27 07/17/2024    BUN 50 (H) 07/17/2024    CREATININE 7.54 (H) 07/17/2024    GLUCOSE 96 07/17/2024    CALCIUM 8.3 (L) 07/17/2024    EGFR 6 07/17/2024    and CBC:   Lab Results   Component Value Date    WBC 5.23 07/17/2024    HGB 8.5 (L) 07/17/2024    HCT 25 (L) 07/17/2024     (H) 07/17/2024     (L) 07/17/2024    RBC 2.74 (L) 07/17/2024    MCH 31.8 07/17/2024    MCHC 31.4 07/17/2024    RDW 15.9 (H) 07/17/2024    MPV 10.3 07/17/2024    NRBC 0 07/17/2024       Imaging Results: I have personally reviewed pertinent reports.    Chest Xray(s): negative for acute findings   FAST exam(s): negative for acute findings   CT Scan(s): negative for acute findings   Additional Xray(s): negative for acute findings     Other Studies: none

## 2024-07-18 NOTE — CASE MANAGEMENT
Case Management Assessment & Discharge Planning Note    Patient name Asad Galloway  Location S /S -01 MRN [id number]  : 1960 Date 2024       Current Admission Date: 2024  Current Admission Diagnosis:Syncope and collapse   Patient Active Problem List    Diagnosis Date Noted Date Diagnosed    Acute deep vein thrombosis (DVT) of right lower extremity ([facility]) 2024     Bicytopenia 2024     Obesity (BMI 30.0-34.9) 2024     QT prolongation 2024     Acute deep vein thrombosis (DVT) of right peroneal vein ([facility]) 2024     Acute cough 2024     Right ankle pain 2024     CHF (congestive heart failure) ([facility]) 2024     PAD (peripheral artery disease) ([facility]) 2024     Type 2 diabetes mellitus with diabetic neuropathy, unspecified whether long term insulin use ([facility]) 2024     Ulcer of left heel, limited to breakdown of skin ([facility]) 2024     Hypertensive heart and chronic kidney disease with heart failure and with stage 5 chronic kidney disease, or end stage renal disease ([facility]) 2024     Fall 2024     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 ([facility]) 2024     Chronic systolic heart failure ([facility]) 2024     Hypotension 2023     Edema of left lower extremity 2023     Rectal bleeding 2023     Nonhealing ulcer of heel ([facility]) 2023     Elevated troponin 2023     Presence of external cardiac defibrillator 2023     Diabetic polyneuropathy associated with type 2 diabetes mellitus ([facility]) 2023     Absence of toe of right foot ([facility]) 2023     Hammer toes of both feet 2023     Ischemic cardiomyopathy 2023     Moderate AS (aortic stenosis) 2023     Mood disorder ([facility]) 2023     Old myocardial infarction 2023     Hyponatremia 2023     Syncope and collapse 2023     SOB (shortness of breath) 10/21/2022     Left arm  swelling 10/21/2022     CAD, multiple vessel 07/14/2022     Electrolyte abnormality 05/02/2022     Chest pain 05/01/2022     Generalized weakness 04/19/2022     Primary hypertension 04/06/2022     Penetrating foot wound, left, initial encounter 01/19/2022     Emotional lability 01/19/2022     History of 2019 novel coronavirus disease (COVID-19) 12/26/2020     ESRD on dialysis (HCC) 12/26/2020     Anemia 10/05/2020     S/P arteriovenous (AV) fistula creation 04/27/2020     Pre-kidney transplant, listed 04/27/2020     Urge incontinence 12/20/2019     Anxiety associated with depression 02/28/2019     History of stroke in adulthood 10/04/2018     Abnormal EEG 09/24/2018     Other constipation 08/17/2018     GERD (gastroesophageal reflux disease) 08/13/2018     Elevated alkaline phosphatase level 08/03/2018     Nephrotic range proteinuria 03/28/2018     Type 2 diabetes mellitus (HCC) 02/26/2016     Dizziness 02/26/2016     Hyperlipidemia 02/26/2016     Antiplatelet or antithrombotic long-term use 02/26/2016       LOS (days): 0  Geometric Mean LOS (GMLOS) (days):   Days to GMLOS:     OBJECTIVE:              Current admission status: Observation       Preferred Pharmacy:   [id number] [place] - [address]  [address] [address]  Phone: [phone] Fax: [phone]    Primary Care Provider: Raj Auguste DO    Primary Insurance: MEDICARE  Secondary Insurance: [facility] BLUE SHIELD    ASSESSMENT:  Active Health Care Proxies       [facility] Representative - Spouse   Primary Phone: [phone] (Mobile)                           Readmission Root Cause  30 Day Readmission: Yes  Who directed you to return to the hospital?: Family, Self  Did you understand whom to contact if you had questions or problems?: Yes  Did you get your prescriptions before you left the hospital?: Yes  Were you able to get your prescriptions filled when you left the hospital?: Yes  Did you take your  medications as prescribed?: Yes  Were you able to get to your follow-up appointments?: Yes  During previous admission, was a post-acute recommendation made?: No (OPPT)  Patient was readmitted due to: S/p fall on the stairs  Action Plan: Cards and PT/OT evals    Patient Information  Admitted from:: Home  Mental Status: Alert  During Assessment patient was accompanied by: Spouse  Assessment information provided by:: Spouse  Primary Caregiver: Self  Support Systems: Spouse/significant other, Family members, Children  What city do you live in[place]  Home entry access options. Select all that apply.: Stairs  Number of steps to enter home.: 2  Do the steps have railings?: Yes  Type of Current Residence: Bi-level  Upon entering residence, is there a bedroom on the main floor (no further steps)?: No  A bedroom is located on the following floor levels of residence (select all that apply):: 2nd Floor  Upon entering residence, is there a bathroom on the main floor (no further steps)?: No  Indicate which floors of current residence have a bathroom (select all the apply):: 2nd Floor  Number of steps to 2nd floor from main floor: One Flight  Living Arrangements: Lives w/ Spouse/significant other    Activities of Daily Living Prior to Admission  Functional Status: Independent  Completes ADLs independently?: Yes  Ambulates independently?: Yes  Does patient use assisted devices?: Yes  Assisted Devices (DME) used: Straight Cane  Does patient currently own DME?: Yes  What DME does the patient currently own?: Straight Cane, Walker  Does patient have a history of Outpatient Therapy (PT/OT)?: Yes  Does the patient have a history of Short-Term Rehab?: No  Does patient have a history of HHC?: Yes ([facility])  Does patient currently have HHC?: No         Patient Information Continued  Income Source: SSI/SSD  Does patient have prescription coverage?: Yes  Does patient receive dialysis treatments?: Yes ([facility]/W[facility]F 6:30am)  Does  patient have a history of substance abuse?: No  Does patient have a history of Mental Health Diagnosis?: No         Means of Transportation  Means of Transport to Appts:: Family transport      Social Determinants of Health (SDOH)      Flowsheet Row Most Recent Value   Housing Stability    In the last 12 months, was there a time when you were not able to pay the mortgage or rent on time? N   At any time in the past 12 months, were you homeless or living in a shelter (including now)? N   Transportation Needs    In the past 12 months, has lack of transportation kept you from medical appointments or from getting medications? no   In the past 12 months, has lack of transportation kept you from meetings, work, or from getting things needed for daily living? No   Food Insecurity    Within the past 12 months, you worried that your food would run out before you got the money to buy more. Never true   Within the past 12 months, the food you bought just didn't last and you didn't have money to get more. Never true   Utilities    In the past 12 months has the electric, gas, oil, or water company threatened to shut off services in your home? No            DISCHARGE DETAILS:    Discharge planning discussed with:: Patient and spouse at bedside  Freedom of Choice: Yes     CM contacted family/caregiver?: Yes  Were Treatment Team discharge recommendations reviewed with patient/caregiver?: Yes  Did patient/caregiver verbalize understanding of patient care needs?: Yes  Were patient/caregiver advised of the risks associated with not following Treatment Team discharge recommendations?: Yes    Contacts  Patient Contacts: Spouse Eliana  Contact Method: In Person  Reason/Outcome: Discharge Planning, Emergency Contact                        Treatment Team Recommendation: Other (TBD)  Discharge Destination Plan:: Other (TBD)                                         Additional Comments: CM spoke with patient and spouse at bedside this morning  to introduce role of CM and begin discharge planning. Patient is a readmission from home -- As per spouse, patient fell on the steps due to increased weakness since previous admission. Patient lives with spouse and 2 children (23 and 27 y/o), in a bilevel home, 2STE and 4 steps to bed and bath. As per spouse, patient was mostly IPTA but receives assistance from family (especially sons) as needed -- He uses [facility]PC for ambulation, but owns a RW as well. [facility] [place] [facility] [place]F for HD @ 6:30am -- family drives. CM reviewed possibility of recommendation for IP rehab given current [facility] score of 9 -- Spouse states she will discuss this possibility with the family. CM will continue to follow and plan for discharge accordingly.

## 2024-07-18 NOTE — ASSESSMENT & PLAN NOTE
In the setting of ESRD on HD  Nephrology consulted      Lab Results   Component Value Date/Time    SODIUM 133 (L) 07/18/2024 08:38 AM    SODIUM 133 (L) 07/17/2024 09:26 AM

## 2024-07-18 NOTE — OCCUPATIONAL THERAPY NOTE
Occupational Therapy Cancellation Note        Patient Name: Asad Galloway  Today's Date: 7/18/2024 07/18/24 0903   Note Type   Note type Cancelled Session   Cancel Reasons Patient off floor/hemodialysis   Additional Comments OT orders received, chart reviewed. Pt currently receiving bedside HD. Will cancel and f/u as schedule allows     Priscila Guerra OT

## 2024-07-18 NOTE — ASSESSMENT & PLAN NOTE
After returning home from being discharged from the hospital day prior, sustained a mechanical fall while walking up steps with subsequent fall backwards and reported head strike-> lifted up and assisted by son to his room at the time. Family denied syncope  Presented to the ED after inability to lift himself off a toilet seat with weakness  CT of head and cervical spine unremarkable for acute injury/abnormalities - remainder of traumatic radiology also unremarkable for acute injuries  Orthostatics negative  TSH WNL  Echocardiogram with improved EF 45%  Seen by Cardiology, cardiac source not suspected  Pending PT eval, anticipating need for STR, family agreeable to rehab

## 2024-07-18 NOTE — QUICK NOTE
Per RN, the patient was having difficulty swallowing Toprol XL even after cutting the scored tablet in half.  Medication switched to the short acting so it could be crushed

## 2024-07-18 NOTE — PROGRESS NOTES
[facility]  Progress Note  Name: Asad Galloway I MRN: [id number]  Unit/Bed#: AN IP DIALYSISI Date of Admission: 7/17/2024   Date of Service: 7/17/2024  I Hospital Day: 0    * Syncope and collapse  Assessment & Plan  After returning home from being discharged from the hospital yesterday, sustained a syncopal episode while walking up steps with subsequent fall backwards and reported head strike-> lifted up and assisted by son to his room at the time  Presented to the ED today after inability to lift himself off a toilet seat with weakness  CT of head and cervical spine unremarkable for acute injury/abnormalities - remainder of traumatic radiology also unremarkable for acute injuries  Orthostatics negative  TSH WNL  Echocardiogram pending  Cardiology consulted, input appreciated  At this time they do not suspect cardiac etiology  PT OT consulted    QT prolongation  Assessment & Plan  QT of 486 on EKG (QTc of 516)  Limit/avoid QT prolonging agents as possible  Chronically elevated looking back for more than a year  Repeat EKG if patient complains of chest pain    Obesity (BMI 30.0-34.9)  Assessment & Plan  BMI of 34.66  Lifestyle/diet modifications    Bicytopenia  Assessment & Plan  Chronic anemia/thrombocytopenia noted  Monitor CBC    Acute deep vein thrombosis (DVT) of right lower extremity (HCC)  Assessment & Plan  Recently diagnosed on hospitalization earlier this month (just discharged yesterday)  Continue Eliquis    PAD (peripheral artery disease) (HCC)  Assessment & Plan  Follows outpatient podiatry  Status post right toe amputation  S/p arteriogram earlier this year with a multitude of LLE vascular intervention  Continue Effient/statin    Chronic systolic heart failure (HCC)  Assessment & Plan  Last EF of 40% in December 2023 with apical akinesis and multi-regional LV hypokinesis, mild-moderate AR, moderate -severe AS, moderate MR, and mild TR -> in light of primary/presenting issue,  check updated echocardiogram   Fluid management via dialysis  Continue Toprol-XL for beta-blockade - c/w Entresto  Sodium/fluid restriction  Monitor/replete electrolyte deficiencies if present  Cardiology consulted, input appreciated  Outpatient follow-up  Repeat echocardiogram pending    Elevated troponin  Assessment & Plan  Presenting troponin of 421 (was 97 two weeks ago)  Possibly non-MI troponin elevation in the setting of fall yesterday with underlying CHF and ESRD on HD    Troponin 421->414->300  Cardiology consulted, input appreciated    Hyponatremia  Assessment & Plan  In the setting of ESRD on HD  Nephrology consulted      Lab Results   Component Value Date/Time    SODIUM 133 (L) 07/18/2024 08:38 AM    SODIUM 133 (L) 07/17/2024 09:26 AM        ESRD on dialysis (HCC)  Assessment & Plan  Continue routine hemodialysis per nephrology (typically M/W/F)  On Sensipar/Rocaltrol    History of stroke in adulthood  Assessment & Plan  C/w Effient/statin    Hyperlipidemia  Assessment & Plan  Continue statin    Type 2 diabetes mellitus (HCC)  Assessment & Plan  Lab Results   Component Value Date    HGBA1C 5.9 (H) 07/14/2024     Diet controlled at home  On SSI coverage per [employer]u-Cheks while hospitalized  Carbohydrate restriction  Hypoglycemia protocol          VTE Pharmacologic Prophylaxis: VTE Score: 7 High Risk (Score >/= 5) - Pharmacological DVT Prophylaxis Ordered: apixaban (Eliquis). Sequential Compression Devices Ordered.    Mobility:   Basic Mobility Inpatient Raw Score: 9  JH-HLM Goal: 3: Sit at edge of bed  JH-HLM Achieved: 3: Sit at edge of bed  JH-HLM Goal achieved. Continue to encourage appropriate mobility.    Patient Centered Rounds: I performed bedside rounds with nursing staff today.   Discussions with Specialists or Other Care Team Provider: Case management    Education and Discussions with Family / Patient: Updated  (wife) at bedside.    Total Time Spent on Date of Encounter in care of  patient: 50 mins. This time was spent on one or more of the following: performing physical exam; counseling and coordination of care; obtaining or reviewing history; documenting in the medical record; reviewing/ordering tests, medications or procedures; communicating with other healthcare professionals and discussing with patient's family/caregivers.    Current Length of Stay: 0 day(s)  Current Patient Status: Inpatient   Certification Statement: The patient will continue to require additional inpatient hospital stay due to pending echocardiogram, PT/OT consults, possible placement  Discharge Plan: Anticipate discharge in 24-48 hrs to discharge location to be determined pending rehab evaluations.    Code Status: Level 1 - Full Code    Subjective:   Patient is feeling good and asking about going home.  He denies any lightheadedness or dizziness.  He said he has been eating and drinking well.  Denies constipation or pain.  All questions and concerns addressed.    Objective:     Vitals:   Temp (24hrs), Av.8 °F (36.6 °C), Min:97.4 °F (36.3 °C), Max:98.3 °F (36.8 °C)    Temp:  [97.4 °F (36.3 °C)-98.3 °F (36.8 °C)] 98.3 °F (36.8 °C)  HR:  [67-82] 77  Resp:  [14-18] 16  BP: (108-154)/(54-92) 115/67  SpO2:  [93 %-100 %] 93 %  Body mass index is 34.52 kg/m².     Input and Output Summary (last 24 hours):     Intake/Output Summary (Last 24 hours) at 2024 1655  Last data filed at 2024 1225  Gross per 24 hour   Intake 500 ml   Output 4500 ml   Net -4000 ml       Physical Exam:   Physical Exam  Vitals and nursing note reviewed.   Constitutional:       General: He is not in acute distress.     Appearance: Normal appearance. He is obese. He is not ill-appearing.   HENT:      Head: Normocephalic.      Nose: Nose normal.      Mouth/Throat:      Mouth: Mucous membranes are moist.      Pharynx: Oropharynx is clear.   Eyes:      Conjunctiva/sclera: Conjunctivae normal.   Cardiovascular:      Rate and Rhythm: Normal rate  and regular rhythm.      Pulses: Normal pulses.      Heart sounds: Normal heart sounds. No murmur heard.  Pulmonary:      Effort: Pulmonary effort is normal. No respiratory distress.      Breath sounds: Normal breath sounds. No wheezing or rhonchi.   Abdominal:      General: Bowel sounds are normal. There is no distension.      Palpations: Abdomen is soft.      Tenderness: There is no abdominal tenderness. There is no guarding.   Musculoskeletal:      Right lower leg: No edema.      Left lower leg: No edema.   Skin:     General: Skin is warm and dry.   Neurological:      General: No focal deficit present.      Mental Status: He is alert and oriented to person, place, and time. Mental status is at baseline.   Psychiatric:         Mood and Affect: Mood normal.         Thought Content: Thought content normal.          Additional Data:     Labs:  Results from last 7 days   Lab Units 07/18/24  0838   WBC Thousand/uL 3.89*   HEMOGLOBIN g/dL 7.9*   HEMATOCRIT % 25.5*   PLATELETS Thousands/uL 111*   SEGS PCT % 72   LYMPHO PCT % 15   MONO PCT % 9   EOS PCT % 3     Results from last 7 days   Lab Units 07/18/24  0838 07/16/24  0445 07/15/24  0623   SODIUM mmol/L 133*   < > 133*   POTASSIUM mmol/L 4.8   < > 6.0*   CHLORIDE mmol/L 97   < > 96   CO2 mmol/L 24   < > 23   CO2, I-STAT   --    < >  --    BUN mg/dL 59*   < > 70*   CREATININE mg/dL 8.77*   < > 8.75*   ANION GAP mmol/L 12   < > 14*   CALCIUM mg/dL 7.8*   < > 8.3*   ALBUMIN g/dL  --   --  3.6   TOTAL BILIRUBIN mg/dL  --   --  0.62   ALK PHOS U/L  --   --  99   ALT U/L  --   --  6*   AST U/L  --   --  6*   GLUCOSE RANDOM mg/dL 166*   < > 101    < > = values in this interval not displayed.     Results from last 7 days   Lab Units 07/14/24  1901   INR  1.01     Results from last 7 days   Lab Units 07/18/24  1613 07/18/24  1112 07/18/24  0705 07/17/24  2108 07/17/24  1713 07/16/24  1121 07/16/24  0718 07/15/24  2133 07/15/24  1515 07/15/24  1208 07/15/24  0731 07/15/24  0202    POC GLUCOSE mg/dl 89 91 92 100 99 118 104 177* 97 113 98 110     Results from last 7 days   Lab Units 24  1901   HEMOGLOBIN A1C % 5.9*     Results from last 7 days   Lab Units 07/15/24  0450 24  1901   PROCALCITONIN ng/ml 0.31* 0.33*       Lines/Drains:  Invasive Devices       Peripheral Intravenous Line  Duration             Peripheral IV 24 Right Antecubital 1 day              Line  Duration             Hemodialysis Access Left Upper arm -- days                      Telemetry:  Telemetry Orders (From admission, onward)               24 Hour Telemetry Monitoring  Continuous x 24 Hours (Telem)           Question:  Reason for 24 Hour Telemetry  Answer:  Syncope suspected to be cardiac in origin                     Telemetry Reviewed: Normal Sinus Rhythm  Indication for Continued Telemetry Use: No indication for continued use. Will discontinue.              Imaging: No pertinent imaging reviewed.    Recent Cultures (last 7 days):         Last 24 Hours Medication List:   Current Facility-Administered Medications   Medication Dose Route Frequency Provider Last Rate    acetaminophen  650 mg Oral Q6H PRN Catia Mccauley MD      apixaban  5 mg Oral BID Catia Mccauley MD      atorvastatin  40 mg Oral Daily With Dinner Catia Mccauley MD      calcitriol  0.5 mcg Oral Once per day on  Catia Mccauley MD      cinacalcet  120 mg Oral Once per day on  Catia Mccauley MD      divalproex sodium  125 mg Oral Q12H MICH Catia Mccauley MD      HYDROmorphone  0.3 mg Intravenous Q4H PRN Catia Mccauley MD      insulin lispro  1-6 Units Subcutaneous 4x Daily (AC & HS) Catia Mccauley MD      iron sucrose  50 mg Intravenous Weekly BRITTANY Torrez      meclizine  12.5 mg Oral Q8H PRN Catia Mccauley MD      metoprolol succinate  50 mg Oral BID Catia Mccauley MD      oxyCODONE  5 mg Oral Q6H PRN Catia Mccauley MD      prasugrel  5 mg Oral Daily Catia Mccauley MD       sacubitril-valsartan  1 tablet Oral Daily Catia Mccauley MD      trimethobenzamide  200 mg Intramuscular Q6H PRN Catia Mccauley MD          Today, Patient Was Seen By: BRITTANY Bradley    **Please Note: This note may have been constructed using a voice recognition system.**

## 2024-07-18 NOTE — ASSESSMENT & PLAN NOTE
Last EF of 40% in December 2023 with apical akinesis and multi-regional LV hypokinesis, mild-moderate AR, moderate -severe AS, moderate MR, and mild TR -> in light of primary/presenting issue, check updated echocardiogram   Fluid management via dialysis  Continue Toprol-XL for beta-blockade - c/w Entresto  Sodium/fluid restriction  Monitor/replete electrolyte deficiencies if present  Repeat echo results reviewed. EF 45%

## 2024-07-18 NOTE — ASSESSMENT & PLAN NOTE
Presenting troponin of 421 (was 97 two weeks ago)  Possibly non-MI troponin elevation in the setting of fall with underlying CHF and ESRD on HD    Troponin flat 421->414->300  No acute cardiac interventions

## 2024-07-19 ENCOUNTER — APPOINTMENT (INPATIENT)
Dept: DIALYSIS | Facility: HOSPITAL | Age: 64
DRG: 555 | End: 2024-07-19
Attending: INTERNAL MEDICINE
Payer: MEDICARE
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
## 2024-07-19 LAB
ANION GAP SERPL CALCULATED.3IONS-SCNC: 11 MMOL/L (ref 4–13)
BASOPHILS # BLD AUTO: 0.03 THOUSANDS/ÂΜL (ref 0–0.1)
BASOPHILS NFR BLD AUTO: 1 % (ref 0–1)
BUN SERPL-MCNC: 40 MG/DL (ref 5–25)
CALCIUM SERPL-MCNC: 8 MG/DL (ref 8.4–10.2)
CHLORIDE SERPL-SCNC: 92 MMOL/L (ref 96–108)
CO2 SERPL-SCNC: 27 MMOL/L (ref 21–32)
CREAT SERPL-MCNC: 6.06 MG/DL (ref 0.6–1.3)
EOSINOPHIL # BLD AUTO: 0.11 THOUSAND/ÂΜL (ref 0–0.61)
EOSINOPHIL NFR BLD AUTO: 2 % (ref 0–6)
ERYTHROCYTE [DISTWIDTH] IN BLOOD BY AUTOMATED COUNT: 16 % (ref 11.6–15.1)
GFR SERPL CREATININE-BSD FRML MDRD: 8 ML/MIN/1.73SQ M
GLUCOSE SERPL-MCNC: 100 MG/DL (ref 65–140)
GLUCOSE SERPL-MCNC: 101 MG/DL (ref 65–140)
GLUCOSE SERPL-MCNC: 108 MG/DL (ref 65–140)
GLUCOSE SERPL-MCNC: 119 MG/DL (ref 65–140)
GLUCOSE SERPL-MCNC: 84 MG/DL (ref 65–140)
GLUCOSE SERPL-MCNC: 84 MG/DL (ref 65–140)
HCT VFR BLD AUTO: 27.3 % (ref 36.5–49.3)
HGB BLD-MCNC: 8.8 G/DL (ref 12–17)
IMM GRANULOCYTES # BLD AUTO: 0.06 THOUSAND/UL (ref 0–0.2)
IMM GRANULOCYTES NFR BLD AUTO: 1 % (ref 0–2)
LYMPHOCYTES # BLD AUTO: 0.96 THOUSANDS/ÂΜL (ref 0.6–4.47)
LYMPHOCYTES NFR BLD AUTO: 19 % (ref 14–44)
MAGNESIUM SERPL-MCNC: 1.9 MG/DL (ref 1.9–2.7)
MCH RBC QN AUTO: 31.9 PG (ref 26.8–34.3)
MCHC RBC AUTO-ENTMCNC: 32.2 G/DL (ref 31.4–37.4)
MCV RBC AUTO: 99 FL (ref 82–98)
MONOCYTES # BLD AUTO: 0.73 THOUSAND/ÂΜL (ref 0.17–1.22)
MONOCYTES NFR BLD AUTO: 15 % (ref 4–12)
MRSA NOSE QL CULT: NORMAL
NEUTROPHILS # BLD AUTO: 3.06 THOUSANDS/ÂΜL (ref 1.85–7.62)
NEUTS SEG NFR BLD AUTO: 62 % (ref 43–75)
NRBC BLD AUTO-RTO: 0 /100 WBCS
PLATELET # BLD AUTO: 130 THOUSANDS/UL (ref 149–390)
PMV BLD AUTO: 11 FL (ref 8.9–12.7)
POTASSIUM SERPL-SCNC: 4.7 MMOL/L (ref 3.5–5.3)
RBC # BLD AUTO: 2.76 MILLION/UL (ref 3.88–5.62)
SODIUM SERPL-SCNC: 130 MMOL/L (ref 135–147)
WBC # BLD AUTO: 4.95 THOUSAND/UL (ref 4.31–10.16)

## 2024-07-19 PROCEDURE — 97167 OT EVAL HIGH COMPLEX 60 MIN: CPT

## 2024-07-19 PROCEDURE — 97116 GAIT TRAINING THERAPY: CPT

## 2024-07-19 PROCEDURE — 99232 SBSQ HOSP IP/OBS MODERATE 35: CPT | Performed by: INTERNAL MEDICINE

## 2024-07-19 PROCEDURE — 5A1D70Z PERFORMANCE OF URINARY FILTRATION, INTERMITTENT, LESS THAN 6 HOURS PER DAY: ICD-10-PCS | Performed by: INTERNAL MEDICINE

## 2024-07-19 PROCEDURE — 83735 ASSAY OF MAGNESIUM: CPT

## 2024-07-19 PROCEDURE — 90935 HEMODIALYSIS ONE EVALUATION: CPT | Performed by: INTERNAL MEDICINE

## 2024-07-19 PROCEDURE — 85025 COMPLETE CBC W/AUTO DIFF WBC: CPT

## 2024-07-19 PROCEDURE — 99232 SBSQ HOSP IP/OBS MODERATE 35: CPT | Performed by: NURSE PRACTITIONER

## 2024-07-19 PROCEDURE — 82948 REAGENT STRIP/BLOOD GLUCOSE: CPT

## 2024-07-19 PROCEDURE — 97163 PT EVAL HIGH COMPLEX 45 MIN: CPT

## 2024-07-19 PROCEDURE — 80048 BASIC METABOLIC PNL TOTAL CA: CPT

## 2024-07-19 RX ORDER — PRASUGREL 10 MG/1
5 TABLET, FILM COATED ORAL
Status: DISCONTINUED | OUTPATIENT
Start: 2024-07-20 | End: 2024-07-21 | Stop reason: HOSPADM

## 2024-07-19 RX ORDER — LANOLIN ALCOHOL/MO/W.PET/CERES
3 CREAM (GRAM) TOPICAL
Status: DISCONTINUED | OUTPATIENT
Start: 2024-07-19 | End: 2024-07-21 | Stop reason: HOSPADM

## 2024-07-19 RX ADMIN — Medication 3 MG: at 01:20

## 2024-07-19 RX ADMIN — CINACALCET 120 MG: 30 TABLET, FILM COATED ORAL at 13:12

## 2024-07-19 RX ADMIN — CALCITRIOL CAPSULES 0.25 MCG 0.5 MCG: 0.25 CAPSULE ORAL at 13:13

## 2024-07-19 RX ADMIN — METOPROLOL TARTRATE 50 MG: 50 TABLET, FILM COATED ORAL at 13:12

## 2024-07-19 RX ADMIN — Medication 3 MG: at 21:41

## 2024-07-19 RX ADMIN — DIVALPROEX SODIUM 125 MG: 125 CAPSULE ORAL at 13:13

## 2024-07-19 RX ADMIN — IRON SUCROSE 50 MG: 20 INJECTION, SOLUTION INTRAVENOUS at 10:55

## 2024-07-19 RX ADMIN — ATORVASTATIN CALCIUM 40 MG: 40 TABLET, FILM COATED ORAL at 17:22

## 2024-07-19 RX ADMIN — APIXABAN 5 MG: 5 TABLET, FILM COATED ORAL at 17:22

## 2024-07-19 RX ADMIN — OXYCODONE HYDROCHLORIDE 5 MG: 5 TABLET ORAL at 09:49

## 2024-07-19 RX ADMIN — APIXABAN 5 MG: 5 TABLET, FILM COATED ORAL at 13:12

## 2024-07-19 RX ADMIN — DIVALPROEX SODIUM 125 MG: 125 CAPSULE ORAL at 21:40

## 2024-07-19 RX ADMIN — PRASUGREL 5 MG: 10 TABLET, FILM COATED ORAL at 15:01

## 2024-07-19 RX ADMIN — TRIMETHOBENZAMIDE HYDROCHLORIDE 200 MG: 100 INJECTION INTRAMUSCULAR at 00:39

## 2024-07-19 NOTE — PLAN OF CARE
UF goal was not met d/t UF was switched off in response to report of abdominal cramps.    Post-Dialysis RN Treatment Note    Blood Pressure:  Pre 113/54 mm/Hg  Post 138/63 mmHg   EDW  94.6 kg    Weight:  Pre Not Applicable kg   Post Not Applicable kg   Mode of weight measurement: Bed Scale appears broken because the bed was zeroed before the bed was transferred to bed but the scale showed 70.6kg. Primary RN informed.   Volume Removed  2,500 ml    Treatment duration 180 minutes    NS given  No    Treatment shortened? Yes, describe: 3 instead of hours d/t patient census. Nephrology informed.   Medications given during Rx Venofer 50mg IV   Estimated Kt/V  Not Applicable   Access type: AV fistula   Access Issues: Yes, describe: patient kept moving the arm with AVF and was not following instruction not to move it.     Report called to primary nurse   Yes, in person to Amaury KATE RN      Started patient on hemodialysis treatment with UF goal of 3L net as tolerated per Dr. Reis x 3 hours x 2K bath for serum k+ of 4.7 today 7/19/24.    Problem: METABOLIC, FLUID AND ELECTROLYTES - ADULT  Goal: Electrolytes maintained within normal limits  Description: INTERVENTIONS:  - Monitor labs and assess patient for signs and symptoms of electrolyte imbalances  - Administer electrolyte replacement as ordered  - Monitor response to electrolyte replacements, including repeat lab results as appropriate  - Instruct patient on fluid and nutrition as appropriate  Outcome: Progressing  Goal: Fluid balance maintained  Description: INTERVENTIONS:  - Monitor labs   - Monitor I/O and WT  - Instruct patient on fluid and nutrition as appropriate  - Assess for signs & symptoms of volume excess or deficit  Outcome: Progressing

## 2024-07-19 NOTE — PROGRESS NOTES
General Cardiology   Progress Note -  Team One   Asad Galloway 64 y.o. male MRN: [id number]    Unit/Bed#: S -01 Encounter: [id number]  Fall   Patient and wife report mechanical fall and note no LOC   He denies dizziness, lightheaded, chest pain or SOB prior or after fall  He reports no syncope or history of syncope      2. Ischemic cardiomyopathy   Echocardiogram this admission showed EF 45%, mild global hypokinesis, RV mildly dilated, mild AI, moderate AS and mild MR   EF 40% on echocardiogram from 12/2023  On GDMT: metoprolol XL and Entresto   Euvolemic on exam. Volume status managed by HD      3. MVCAD   s/p PCI of OM 5/2022 and PCI of LAD and LCx 6/2022  On Effient, atorvastatin and metoprolol XL   No cardiac complaints      4. Valvular disease   Echocardiogram this admission showed EF 45%, mild global hypokinesis, RV mildly dilated, mild AI, moderate AS and mild MR   Follow up as outpatient      5. ESRD on HD   Followed by  nephrology   Outpatient HD schedule M/W/F   S/p HD today      6. Hypertension   Average /59  On metoprolol XL 50 mg PO BID and entresto 24/26 mg PO daily      7. Hyperlipidemia   LDL 21  On atorvastatin 40 mg PO daily      8. Type II diabetes   Hemoglobin A1C 5.9  On SSI      9. History of CVA   On effient and atorvastatin      10. R peroneal DVT   Followed by primary team   On Eliquis 5 mg PO BID     Patient will follow up with Dr. Tan 8/29/2024 as scheduled.     Subjective  Patient resting in bed. He completed HD. No chest pain, SOB or palpitations. No fever or chills. Pain in his RLE has improved.     Review of Systems   Constitutional: Positive for malaise/fatigue. Negative for chills and fever.   HENT:  Negative for congestion.    Cardiovascular:  Negative for chest pain, dyspnea on exertion, leg swelling and orthopnea.   Respiratory:  Negative for shortness of breath.    Musculoskeletal:  Negative for falls.   Gastrointestinal:  Negative for bloating, nausea and  "vomiting.   Neurological:  Negative for dizziness and light-headedness.   Psychiatric/Behavioral:  Negative for altered mental status.    All other systems reviewed and are negative.      Objective:   Vitals: Blood pressure 138/63, pulse 72, temperature (!) 97.4 °F (36.3 °C), temperature source Axillary, resp. rate 16, height 5' 8\" (1.727 m), weight 103 kg (227 lb), SpO2 99%.,       Body mass index is 34.52 kg/m².,     Systolic (24hrs), Av , Min:78 , Max:138     Diastolic (24hrs), Av, Min:50, Max:67          Intake/Output Summary (Last 24 hours) at 2024 1334  Last data filed at 2024 1300  Gross per 24 hour   Intake 1422 ml   Output 3000 ml   Net -1578 ml     Weight (last 2 days)       Date/Time Weight    24 1414 103 (227)    24 09:11:33 103 (227.96)            Physical Exam  Constitutional:       General: He is not in acute distress.     Appearance: He is obese.   HENT:      Head: Normocephalic.      Mouth/Throat:      Mouth: Mucous membranes are moist.   Cardiovascular:      Rate and Rhythm: Normal rate and regular rhythm.      Pulses: Normal pulses.      Heart sounds: Murmur heard.   Pulmonary:      Effort: Pulmonary effort is normal. No respiratory distress.      Comments: RA  Decreased in bases   Abdominal:      General: Bowel sounds are normal.      Palpations: Abdomen is soft.   Musculoskeletal:         General: No swelling.      Cervical back: Neck supple.   Skin:     General: Skin is warm and dry.      Capillary Refill: Capillary refill takes less than 2 seconds.   Neurological:      Mental Status: He is alert and oriented to person, place, and time.   Psychiatric:         Mood and Affect: Mood normal.         LABORATORY RESULTS      CBC with diff:   Results from last 7 days   Lab Units 24  0505 24  0838 24  0928 24  0926 07/15/24  0450 24  1901   WBC Thousand/uL 4.95 3.89*  --  5.23 7.39 6.20   HEMOGLOBIN g/dL 8.8* 7.9*  --  8.7* 9.3* 9.8*   I " STAT HEMOGLOBIN g/dl  --   --  8.5*  --   --   --    HEMATOCRIT % 27.3* 25.5*  --  27.7* 29.0* 31.6*   HEMATOCRIT, ISTAT %  --   --  25*  --   --   --    MCV fL 99* 100*  --  101* 99* 100*   PLATELETS Thousands/uL 130* 111*  --  119* 136* 144*   RBC Million/uL 2.76* 2.55*  --  2.74* 2.94* 3.15*   MCH pg 31.9 31.0  --  31.8 31.6 31.1   MCHC g/dL 32.2 31.0*  --  31.4 32.1 31.0*   RDW % 16.0* 16.0*  --  15.9* 15.8* 15.6*   MPV fL 11.0 10.9  --  10.3 11.0 10.1   NRBC AUTO /100 WBCs 0 0  --  0 0 0       CMP:  Results from last 7 days   Lab Units 07/19/24  0505 07/18/24  0838 07/17/24  0928 07/17/24  0926 07/16/24  0445 07/15/24  0623 07/14/24  1901   POTASSIUM mmol/L 4.7 4.8  --  5.0 5.0 6.0* 4.9   CHLORIDE mmol/L 92* 97  --  96 96 96 94*   CO2 mmol/L 27 24  --  27 30 23 28   CO2, I-STAT mmol/L  --   --  30  --   --   --   --    BUN mg/dL 40* 59*  --  50* 31* 70* 65*   CREATININE mg/dL 6.06* 8.77*  --  7.54* 5.49* 8.75* 8.33*   GLUCOSE, ISTAT mg/dl  --   --  96  --   --   --   --    CALCIUM mg/dL 8.0* 7.8*  --  8.3* 8.6 8.3* 8.9   AST U/L  --   --   --   --   --  6* 5*   ALT U/L  --   --   --   --   --  6* 8   ALK PHOS U/L  --   --   --   --   --  99 115*   EGFR ml/min/1.73sq m 8 5  --  6 10 5 6       BMP:  Results from last 7 days   Lab Units 07/19/24  0505 07/18/24  0838 07/17/24  0928 07/17/24  0926 07/16/24  0445 07/15/24  0623 07/14/24  1901   POTASSIUM mmol/L 4.7 4.8  --  5.0 5.0 6.0* 4.9   CHLORIDE mmol/L 92* 97  --  96 96 96 94*   CO2 mmol/L 27 24  --  27 30 23 28   CO2, I-STAT mmol/L  --   --  30  --   --   --   --    BUN mg/dL 40* 59*  --  50* 31* 70* 65*   CREATININE mg/dL 6.06* 8.77*  --  7.54* 5.49* 8.75* 8.33*   GLUCOSE, ISTAT mg/dl  --   --  96  --   --   --   --    CALCIUM mg/dL 8.0* 7.8*  --  8.3* 8.6 8.3* 8.9       Lab Results   Component Value Date    NTBN 17,967 (H) 02/11/2023    NTBNP 1,859 (H) 01/02/2022    NTBNP 1,259 (H) 12/30/2020             Results from last 7 days   Lab Units 07/19/24  0505  07/18/24  0838 07/15/24  0623   MAGNESIUM mg/dL 1.9 2.0 2.1          Results from last 7 days   Lab Units 07/14/24  1901   HEMOGLOBIN A1C % 5.9*          Results from last 7 days   Lab Units 07/17/24  0926   TSH 3RD GENERATON uIU/mL 1.563       Results from last 7 days   Lab Units 07/14/24  1901   INR  1.01       Lipid Profile:   Lab Results   Component Value Date    CHOL 203 (H) 08/10/2016     Lab Results   Component Value Date    HDL 30 (L) 01/30/2023    HDL 33 (L) 07/25/2022    HDL 31 (L) 06/20/2022     Lab Results   Component Value Date    LDLCALC 21 01/30/2023    LDLCALC 19 07/25/2022    LDLCALC 25 06/20/2022     Lab Results   Component Value Date    TRIG 123 01/30/2023    TRIG 151 (H) 07/25/2022    TRIG 182 (H) 06/20/2022       Cardiac testing:   No results found for this or any previous visit.    No results found for this or any previous visit.    No results found for this or any previous visit.    No valid procedures specified.  No results found for this or any previous visit.        Meds/Allergies   all current active meds have been reviewed and current meds:   Current Facility-Administered Medications   Medication Dose Route Frequency    acetaminophen (TYLENOL) tablet 650 mg  650 mg Oral Q6H PRN    apixaban (ELIQUIS) tablet 5 mg  5 mg Oral BID    atorvastatin (LIPITOR) tablet 40 mg  40 mg Oral Daily With Dinner    calcitriol (ROCALTROL) capsule 0.5 mcg  0.5 mcg Oral Once per day on Monday Wednesday Friday    cinacalcet (SENSIPAR) tablet 120 mg  120 mg Oral Once per day on Monday Wednesday Friday    divalproex sodium (DEPAKOTE SPRINKLE) capsule 125 mg  125 mg Oral Q12H [facility]    insulin lispro (HumALOG/ADMELOG) 100 units/mL subcutaneous injection 1-6 Units  1-6 Units Subcutaneous 4x Daily (AC & HS)    iron sucrose (VENOFER) 50 mg in sodium chloride 0.9 % 250 mL IVPB  50 mg Intravenous Weekly    meclizine (ANTIVERT) tablet 12.5 mg  12.5 mg Oral Q8H PRN    melatonin tablet 3 mg  3 mg Oral HS    metoprolol  tartrate (LOPRESSOR) tablet 50 mg  50 mg Oral Q12H MICH    prasugrel (EFFIENT) tablet 5 mg  5 mg Oral Daily    sacubitril-valsartan (ENTRESTO) 24-26 MG per tablet 1 tablet  1 tablet Oral Daily    trimethobenzamide (TIGAN) IM injection 200 mg  200 mg Intramuscular Q6H PRN       Medications Prior to Admission:     apixaban (ELIQUIS) 5 mg    atorvastatin (LIPITOR) 40 mg tablet    Blood Glucose Monitoring Suppl (True Metrix Meter) w/Device KIT    Blood Pressure Monitoring (BLOOD PRESSURE CUFF) MISC    calcitriol (ROCALTROL) 0.5 MCG capsule    cinacalcet (SENSIPAR) 60 MG tablet    divalproex sodium (DEPAKOTE SPRINKLE) 125 MG capsule    glucose blood (True Metrix Blood Glucose Test) test strip    Lancets MISC    metoprolol succinate (TOPROL-XL) 50 mg 24 hr tablet    prasugrel (EFFIENT) tablet    sacubitril-valsartan (Entresto) 24-26 MG TABS    Sevelamer Carbonate 2.4 g PACK    Vitamin D3 1.25 MG (69142 UT) capsule           Counseling / Coordination of Care  Total floor / unit time spent today 20 minutes.  Greater than 50% of total time was spent with the patient and / or family counseling and / or coordination of care.      ** Please Note: Dragon 360 Dictation voice to text software may have been used in the creation of this document. **

## 2024-07-19 NOTE — PROGRESS NOTES
NEPHROLOGY HOSPITAL PROGRESS NOTE   Asad Galloway 64 y.o. male MRN: [id number]  Unit/Bed#: S -01 Encounter: [id number]  Reason for Consult: ESRD on HD     ASSESSMENT and PLAN:    1.  ESRD on HD.  MWF at [facility].  Access is left upper extremity AV fistula.  EDW of 94.6 kg.  He missed dialysis treatment 07/17 and had makeup session 07/18.  Dialysis today per schedule.     HEMODIALYSIS PROCEDURE NOTE  The patient was seen and examined on hemodialysis.  Time: 4 hours  Sodium: 138 Blood flow: 450   Dialyzer: F160 Potassium: 2 Dialysate flow: 800   Access: LUE AVF Bicarbonate: 35 Ultrafiltration goal: 3 L   Medications on HD: None     2.  Hyperkalemia.  K 4.7, use 2 K bath.      3.  Hypertension/volume status.  Current Rx: Toprol-XL 50 mg twice daily, Entresto 24-26 mg daily.  Blood pressure is currently controlled on current regimen, use hold parameters.     4.  Hyponatremia. Na 130.  Use 138 mEq sodium bath on HD today.      5.  Anemia in ESRD.  Hb 8.8.  No BAYLEE due to recent DVT.     6.  Secondary hyperparathyroidism of renal origin.  Continue calcitriol 0.5 mcg 3 times a week, Sensipar 120 mg 3 times a week.     7.  Right peroneal DVT.  This was diagnosed on recent admission.  Continue Eliquis 5 mg twice daily.  Dose appropriate for HD for this indication.      8.  PAD with left heel tissue loss.  Continue local wound care.  Outpatient follow-up with vascular surgery.     9.  Fall, questionable syncope.  Workup unrevealing, cardiology and trauma notes reviewed.     SUBJECTIVE / 24H INTERVAL HISTORY:  Seen and examined on dialysis.  Denies dyspnea or leg swelling.     REVIEW OF SYSTEMS:  Review of Systems   Constitutional:  Negative for chills and fever.   HENT:  Negative for ear pain and sore throat.    Eyes:  Negative for pain and visual disturbance.   Respiratory:  Negative for cough and shortness of breath.    Cardiovascular:  Negative for chest pain and palpitations.   Gastrointestinal:  Negative for  abdominal pain and vomiting.   Genitourinary:  Negative for dysuria and hematuria.   Musculoskeletal:  Negative for arthralgias and back pain.   Skin:  Negative for color change and rash.   Neurological:  Negative for seizures and syncope.   All other systems reviewed and are negative.      OBJECTIVE:  Current Weight: Weight - Scale: 103 kg (227 lb)  Vitals:    07/18/24 2216 07/18/24 2217 07/19/24 0051 07/19/24 0106   BP:  94/52 (!) 78/50 100/62   BP Location:    Right arm   Pulse: 71      Resp:       Temp:  98.7 °F (37.1 °C)     TempSrc:       SpO2: 99%      Weight:       Height:           Intake/Output Summary (Last 24 hours) at 7/19/2024 0922  Last data filed at 7/18/2024 2148  Gross per 24 hour   Intake 760 ml   Output 4500 ml   Net -3740 ml     Physical Exam  Vitals and nursing note reviewed.   Constitutional:       General: He is not in acute distress.     Appearance: He is well-developed.   HENT:      Head: Normocephalic and atraumatic.   Eyes:      Conjunctiva/sclera: Conjunctivae normal.   Cardiovascular:      Rate and Rhythm: Normal rate and regular rhythm.      Pulses: Normal pulses.      Heart sounds: Normal heart sounds. No murmur heard.     Comments: LUE AVF in use for HD  Pulmonary:      Effort: Pulmonary effort is normal. No respiratory distress.      Breath sounds: Normal breath sounds.   Abdominal:      Palpations: Abdomen is soft.      Tenderness: There is no abdominal tenderness.   Musculoskeletal:         General: No swelling.      Cervical back: Neck supple.      Right lower leg: No edema.      Left lower leg: No edema.   Skin:     General: Skin is warm and dry.      Capillary Refill: Capillary refill takes less than 2 seconds.   Neurological:      Mental Status: He is alert.   Psychiatric:         Mood and Affect: Mood normal.       Medications:    Current Facility-Administered Medications:     acetaminophen (TYLENOL) tablet 650 mg, 650 mg, Oral, Q6H PRN, Catia Mccauley MD, 650 mg at 07/17/24  1406    apixaban (ELIQUIS) tablet 5 mg, 5 mg, Oral, BID, Catia Mccauley MD, 5 mg at 07/18/24 1740    atorvastatin (LIPITOR) tablet 40 mg, 40 mg, Oral, Daily With Dinner, Catia Mccauley MD, 40 mg at 07/18/24 1740    calcitriol (ROCALTROL) capsule 0.5 mcg, 0.5 mcg, Oral, Once per day on Monday Wednesday Friday, Catia Mccauley MD, 0.5 mcg at 07/17/24 1331    cinacalcet (SENSIPAR) tablet 120 mg, 120 mg, Oral, Once per day on Monday Wednesday Friday, Catia Mccauley MD, 120 mg at 07/17/24 1331    divalproex sodium (DEPAKOTE SPRINKLE) capsule 125 mg, 125 mg, Oral, Q12H MICH, Catia Mccauley MD, 125 mg at 07/18/24 2145    HYDROmorphone (DILAUDID) injection 0.3 mg, 0.3 mg, Intravenous, Q4H PRN, Catia Mccauley MD, 0.3 mg at 07/18/24 1137    insulin lispro (HumALOG/ADMELOG) 100 units/mL subcutaneous injection 1-6 Units, 1-6 Units, Subcutaneous, 4x Daily (AC & HS) **AND** Fingerstick Glucose (POCT), , , 4x Daily AC and at bedtime, Catia Mccauley MD    iron sucrose (VENOFER) 50 mg in sodium chloride 0.9 % 250 mL IVPB, 50 mg, Intravenous, Weekly, BRITTANY Torrez    meclizine (ANTIVERT) tablet 12.5 mg, 12.5 mg, Oral, Q8H PRN, Catia Mccauley MD, 12.5 mg at 07/17/24 1230    melatonin tablet 3 mg, 3 mg, Oral, HS, Christiano Salas MD, 3 mg at 07/19/24 0120    metoprolol tartrate (LOPRESSOR) tablet 50 mg, 50 mg, Oral, Q12H MICH, Nae John MD    oxyCODONE (ROXICODONE) IR tablet 5 mg, 5 mg, Oral, Q6H PRN, Catia Mccauley MD, 5 mg at 07/18/24 1000    prasugrel (EFFIENT) tablet 5 mg, 5 mg, Oral, Daily, Catia Mccauley MD, 5 mg at 07/18/24 1446    sacubitril-valsartan (ENTRESTO) 24-26 MG per tablet 1 tablet, 1 tablet, Oral, Daily, Catia Mccauley MD, 1 tablet at 07/18/24 1740    trimethobenzamide (TIGAN) IM injection 200 mg, 200 mg, Intramuscular, Q6H PRN, Catia Mccauley MD, 200 mg at 07/19/24 0039    Laboratory Results:  Results from last 7 days   Lab Units 07/19/24  0505 07/18/24  0838 07/17/24  0928 07/17/24  0926 07/16/24  0445  "07/15/24  0623 07/15/24  0450 07/14/24  1901   WBC Thousand/uL 4.95 3.89*  --  5.23  --   --  7.39 6.20   HEMOGLOBIN g/dL 8.8* 7.9*  --  8.7*  --   --  9.3* 9.8*   I STAT HEMOGLOBIN g/dl  --   --  8.5*  --   --   --   --   --    HEMATOCRIT % 27.3* 25.5*  --  27.7*  --   --  29.0* 31.6*   HEMATOCRIT, ISTAT %  --   --  25*  --   --   --   --   --    PLATELETS Thousands/uL 130* 111*  --  119*  --   --  136* 144*   POTASSIUM mmol/L 4.7 4.8  --  5.0 5.0 6.0*  --  4.9   CHLORIDE mmol/L 92* 97  --  96 96 96  --  94*   CO2 mmol/L 27 24  --  27 30 23  --  28   CO2, I-STAT mmol/L  --   --  30  --   --   --   --   --    BUN mg/dL 40* 59*  --  50* 31* 70*  --  65*   CREATININE mg/dL 6.06* 8.77*  --  7.54* 5.49* 8.75*  --  8.33*   CALCIUM mg/dL 8.0* 7.8*  --  8.3* 8.6 8.3*  --  8.9   MAGNESIUM mg/dL 1.9 2.0  --   --   --  2.1  --   --    PHOSPHORUS mg/dL  --   --   --   --   --  5.9*  --   --    GLUCOSE, ISTAT mg/dl  --   --  96  --   --   --   --   --        Portions of the record may have been created with voice recognition software. Occasional wrong word or \"sound a like\" substitutions may have occurred due to the inherent limitations of voice recognition software. Read the chart carefully and recognize, using context, where substitutions have occurred. If you have any questions, please contact the dictating provider.    "

## 2024-07-19 NOTE — PROGRESS NOTES
[facility]  Progress Note  Name: Asad Galloway I  MRN: [id number]  Unit/Bed#: S -01 I Date of Admission: 7/17/2024   Date of Service: 7/19/2024 I Hospital Day: 1    Assessment & Plan   * Fall  Assessment & Plan  After returning home from being discharged from the hospital day prior, sustained a mechanical fall while walking up steps with subsequent fall backwards and reported head strike-> lifted up and assisted by son to his room at the time. Family denied syncope  Presented to the ED after inability to lift himself off a toilet seat with weakness  CT of head and cervical spine unremarkable for acute injury/abnormalities - remainder of traumatic radiology also unremarkable for acute injuries  Orthostatics negative  TSH WNL  Echocardiogram with improved EF 45%  Seen by Cardiology, cardiac source not suspected  Pending PT eval, anticipating need for STR, family agreeable to rehab    Chronic systolic heart failure (HCC)  Assessment & Plan  Last EF of 40% in December 2023 with apical akinesis and multi-regional LV hypokinesis, mild-moderate AR, moderate -severe AS, moderate MR, and mild TR -> in light of primary/presenting issue, check updated echocardiogram   Fluid management via dialysis  Continue Toprol-XL for beta-blockade - c/w Entresto  Sodium/fluid restriction  Monitor/replete electrolyte deficiencies if present  Repeat echo results reviewed. EF 45%    Type 2 diabetes mellitus (HCC)  Assessment & Plan  Lab Results   Component Value Date    HGBA1C 5.9 (H) 07/14/2024     Diet controlled at home  On SSI coverage per Accu-Cheks while hospitalized  Carbohydrate restriction  Hypoglycemia protocol    QT prolongation  Assessment & Plan  QT of 486 on EKG (QTc of 516)  Limit/avoid QT prolonging agents as possible  Chronically elevated looking back for more than a year  Repeat EKG if patient complains of chest pain    ESRD on dialysis (HCC)  Assessment & Plan  Continue routine hemodialysis  per nephrology (typically M/W/F)  On Sensipar/Rocaltrol    PAD (peripheral artery disease) (HCC)  Assessment & Plan  Follows outpatient podiatry  Status post right toe amputation  S/p arteriogram earlier this year with a multitude of LLE vascular intervention  Continue Effient/statin    Elevated troponin  Assessment & Plan  Presenting troponin of 421 (was 97 two weeks ago)  Possibly non-MI troponin elevation in the setting of fall with underlying CHF and ESRD on HD    Troponin flat 421->414->300  No acute cardiac interventions    History of stroke in adulthood  Assessment & Plan  C/w Effient/statin    Acute deep vein thrombosis (DVT) of right lower extremity (HCC)  Assessment & Plan  Recently diagnosed on hospitalization earlier this month (just discharged yesterday)  Continue Eliquis    Obesity (BMI 30.0-34.9)  Assessment & Plan  BMI of 34.66  Lifestyle/diet modifications    Bicytopenia  Assessment & Plan  Chronic anemia/thrombocytopenia noted  Monitor CBC    Hyponatremia  Assessment & Plan  In the setting of ESRD on HD  Nephrology consulted      Lab Results   Component Value Date/Time    SODIUM 133 (L) 07/18/2024 08:38 AM    SODIUM 133 (L) 07/17/2024 09:26 AM        Hyperlipidemia  Assessment & Plan  Continue statin       VTE Pharmacologic Prophylaxis:   Pharmacologic: Apixaban (Eliquis)  Mechanical VTE Prophylaxis in Place: Yes    Patient Centered Rounds: I have performed bedside rounds with nursing staff today.    Discussions with Specialists or Other Care Team Provider: Nursing/CM    Education and Discussions with Family / Patient: Patient/Son and spouse updated at the bedside, all questions answered    Current Length of Stay: 1 day(s)    Current Patient Status: Inpatient   Certification Statement: The patient will continue to require additional inpatient hospital stay due to pending PT eval and possible rehab placement    Discharge Plan: Stable for discharge and pending possible rehab placement    Code Status:  Level 1 - Full Code      Subjective:   No acute overnight events. He does report right thigh pain s/p fall. Family does not want him on narcotics. Trauma work up was unremarkable    Objective:     Vitals:   Temp (24hrs), Av.7 °F (36.5 °C), Min:96.9 °F (36.1 °C), Max:98.7 °F (37.1 °C)    Temp:  [96.9 °F (36.1 °C)-98.7 °F (37.1 °C)] 97.4 °F (36.3 °C)  HR:  [64-78] 72  Resp:  [15-16] 16  BP: ()/(50-63) 138/63  SpO2:  [99 %] 99 %  Body mass index is 34.52 kg/m².     Input and Output Summary (last 24 hours):       Intake/Output Summary (Last 24 hours) at 2024 1512  Last data filed at 2024 1300  Gross per 24 hour   Intake 1422 ml   Output 3000 ml   Net -1578 ml       Physical Exam:  General Appearance:    Alert, oriented x 3, cooperative, no distress, appropriately responsive    Head:    Normocephalic, mucous membranes moist   Eyes:    Conjunctiva/corneas clear   Neck:   Supple   Resp:     Clear to auscultation bilaterally, respirations unlabored, no crackles or wheeze     Heart:    Regular rate and rhythm, S1 and S2    Abdomen:     Soft, non-tender, nondistended   Extremities:   Extremities normal, atraumatic, no cyanosis or edema   Neurologic:  nonfocal         Additional Data:     Labs:    Results from last 7 days   Lab Units 24  0505   WBC Thousand/uL 4.95   HEMOGLOBIN g/dL 8.8*   HEMATOCRIT % 27.3*   PLATELETS Thousands/uL 130*   SEGS PCT % 62   LYMPHO PCT % 19   MONO PCT % 15*   EOS PCT % 2     Results from last 7 days   Lab Units 24  0505 24  0838 24  0928 24  0445 07/15/24  0623   POTASSIUM mmol/L 4.7   < >  --    < > 6.0*   CHLORIDE mmol/L 92*   < >  --    < > 96   CO2 mmol/L 27   < >  --    < > 23   CO2, I-STAT mmol/L  --   --  30  --   --    BUN mg/dL 40*   < >  --    < > 70*   CREATININE mg/dL 6.06*   < >  --    < > 8.75*   CALCIUM mg/dL 8.0*   < >  --    < > 8.3*   ALK PHOS U/L  --   --   --   --  99   ALT U/L  --   --   --   --  6*   AST U/L  --   --   --    "--  6*   GLUCOSE, ISTAT mg/dl  --   --  96  --   --     < > = values in this interval not displayed.     Results from last 7 days   Lab Units 07/14/24  1901   INR  1.01       * I Have Reviewed All Lab Data Listed Above.  * Additional Pertinent Lab Tests Reviewed: All Labs For Current Hospital Admission Reviewed    Cultures:   Blood Culture:   Lab Results   Component Value Date    BLOODCX No Growth After 5 Days. 01/29/2023    BLOODCX No Growth After 5 Days. 01/29/2023    BLOODCX Staphylococcus coagulase negative (A) 10/20/2022    BLOODCX No Growth After 5 Days. 10/20/2022    BLOODCX No Growth After 5 Days. 01/02/2022    BLOODCX No Growth After 5 Days. 01/02/2022    BLOODCX No Growth After 5 Days. 12/31/2020     Urine Culture:   Lab Results   Component Value Date    URINECX <10,000 cfu/ml Gram Positive Cocci (A) 01/02/2022    URINECX <10,000 cfu/ml 01/11/2020    URINECX No Growth <1000 cfu/mL 12/20/2019    URINECX No Growth <1000 cfu/mL 10/24/2016     Sputum Culture: No components found for: \"SPUTUMCX\"  Wound Culture:   Lab Results   Component Value Date    WOUNDCULT 3+ Growth of Klebsiella oxytoca (A) 01/18/2024    WOUNDCULT 3+ Growth of Pseudomonas aeruginosa (A) 01/18/2024    WOUNDCULT 3+ Growth of 01/18/2024    WOUNDCULT 1+ Growth of Escherichia coli (A) 02/14/2019    WOUNDCULT 1+ Growth of Pseudomonas aeruginosa (A) 02/14/2019    WOUNDCULT 3+ Growth of 02/14/2019       Last 24 Hours Medication List:   Current Facility-Administered Medications   Medication Dose Route Frequency Provider Last Rate    acetaminophen  650 mg Oral Q6H PRN Catia Mccauley MD      apixaban  5 mg Oral BID Catia Mccauley MD      atorvastatin  40 mg Oral Daily With Dinner Catia Mccauley MD      calcitriol  0.5 mcg Oral Once per day on Monday Wednesday Friday Catia Mccauley MD      cinacalcet  120 mg Oral Once per day on Monday Wednesday Friday Catia Mccauley MD      divalproex sodium  125 mg Oral Q12H [facility] Catia Mccauley MD      insulin lispro  1-6 " Units Subcutaneous 4x Daily (AC & HS) Catia Mccauley MD      iron sucrose  50 mg Intravenous Weekly Jessica Bey, CRNP 50 mg (07/19/24 1055)    meclizine  12.5 mg Oral Q8H PRN Catia Mccauley MD      melatonin  3 mg Oral HS Christiano Salas MD      metoprolol tartrate  50 mg Oral Q12H MICH Nae John MD      [START ON 7/20/2024] prasugrel  5 mg Oral Daily With Lunch Nae John MD      sacubitril-valsartan  1 tablet Oral HS Nae oJhn MD      trimethobenzamide  200 mg Intramuscular Q6H PRN Catia Mccauley MD          Today, Patient Was Seen By: Nae John MD    ** Please Note: Dragon 360 Dictation voice to text software may have been used in the creation of this document. **

## 2024-07-19 NOTE — ARC ADMISSION
Referral received for consideration of patient for inpatient acute rehab.    Reviewed patient's case - recommended for TCF vs SNF/subacute rehab for ongoing therapy needs, as patient has no significant acute medical necessity requiring frequent physician oversight. CM has been updated.

## 2024-07-19 NOTE — PLAN OF CARE
Problem: OCCUPATIONAL THERAPY ADULT  Goal: Performs self-care activities at highest level of function for planned discharge setting.  See evaluation for individualized goals.  Description: Treatment Interventions: ADL retraining, Functional transfer training, UE strengthening/ROM, Endurance training, Patient/family training, Cognitive reorientation, Equipment evaluation/education, Compensatory technique education, Activityengagement          See flowsheet documentation for full assessment, interventions and recommendations.   Note: Limitation: Decreased ADL status, Decreased UE strength, Decreased Safe judgement during ADL, Decreased cognition, Decreased endurance, Decreased self-care trans, Decreased high-level ADLs (trunk control, balance, functional reach, activity tolerance, standing tolerance,)  Prognosis: Good  Assessment: Patient is a 64 y.o. male seen for OT evaluation at [facility] following admission on 7/17/2024  s/p Fall. Please see above for comprehensive list of comorbidities and significant PMHx impacting functional performance.  Upon initial evaluation, pt appears to be performing below baseline functional status.   Occupational performance is affected by the following deficits: decreased balance , decreased standing tolerance for self care tasks , decreased dynamic balance impacting functional reach, decreased trunk control , decreased activity tolerance , impaired memory , attention to task, impaired judgement and problem solving , and impaired safety awareness . Personal/Environmental factors impacting D/C include: Assistance needed for ADLs and functional mobility, High fall risk , and decreased recall of precautions . Supporting factors include: support system available and attitude towards recovery Patient would benefit from OT services within the acute care setting to maximize level of functional independence in the following areas self-care transfers, functional mobility,  and ADLs.  From OT standpoint, recommendation at time of D/C would be Level 2: moderate resource intensity .     Rehab Resource Intensity Level, OT: II (Moderate Resource Intensity)     Priscila Guerra, OT

## 2024-07-19 NOTE — PLAN OF CARE

## 2024-07-19 NOTE — PLAN OF CARE
Problem: PHYSICAL THERAPY ADULT  Goal: Performs mobility at highest level of function for planned discharge setting.  See evaluation for individualized goals.  Description: Treatment/Interventions: Functional transfer training, LE strengthening/ROM, Elevations, Therapeutic exercise, Endurance training, Cognitive reorientation, Patient/family training, Equipment eval/education, Bed mobility, Gait training, Spoke to nursing, Spoke to case management    Equipment Recommended: Walker       See flowsheet documentation for full assessment, interventions and recommendations.  Outcome: Progressing  Note: Prognosis: Good  Problem List: Decreased strength, Decreased endurance, Impaired balance, Decreased mobility, Decreased cognition, Impaired judgement, Decreased safety awareness, Impaired hearing, Pain  Assessment: Pt seen for PT evaluation for mobility assessment & discharge needs. Pt admitted 7/17/2024 s/p fall backwards on step, weakness, R LE DVT. Comorbidities affecting pt's fnxl performance include: CVA, CKD, CAD, DM, ESRD on HD, HTN, Hep B, obesity, neuropathy, spinal cord stimulator, R toe amputations, falls. During PT IE, pt requires TEVIN for bed mobility, MAXA for STS transfer from EOB with RW x2, and MODA + RW for ambulatory transition from EOB to recliner chair ~5ft. During addt'l treatment time pt progresses to ambulate an additional 14ft with RW and variable MIN-MODA + chair follow. Pt displays above outlined functional impairments & limitations, and presents below his baseline level of functional mobility. The AM-PAC & Barthel Index outcome tools were used to assist in determining pt safety w/ mobility/self care & appropriate d/c recommendations, see above for scores. Pt is at risk of falls d/t multiple comorbidities, h/o falls, impaired balance, impaired cognition, impaired insight/safety awareness, use of ambulatory aid, varying levels of pain , acuity of medical illness, ongoing medical treatment of  primary dx, and polypharmacy. Pt's clinical presentation is currently unstable/unpredictable as seen in pt's presentation of vital sign response, changing level of pain, varying levels of cognitive performance, increased fall risk, new onset of impairment of functional mobility, decreased endurance, and new onset of weakness. Pt will benefit from continued PT services in order to address impairments, decrease risk of falls, maximize independence w/ fnxl mobility, & ensure safety w/ mobility for transition to next level of care. Based on pt presentation & impairments, pt would most appropriately benefit from Level 1 (maximal PT intensity) resources upon d/c.        Rehab Resource Intensity Level, PT: I (Maximum Resource Intensity)    See flowsheet documentation for full assessment.

## 2024-07-19 NOTE — OCCUPATIONAL THERAPY NOTE
Occupational Therapy Evaluation     Patient Name: Asad Galloway  Today's Date: 7/19/2024  Problem List  Principal Problem:    Fall  Active Problems:    Type 2 diabetes mellitus (HCC)    Hyperlipidemia    History of stroke in adulthood    ESRD on dialysis (HCC)    Hyponatremia    Elevated troponin    Chronic systolic heart failure (HCC)    PAD (peripheral artery disease) (HCC)    Acute deep vein thrombosis (DVT) of right lower extremity (HCC)    Bicytopenia    Obesity (BMI 30.0-34.9)    QT prolongation    Past Medical History  Past Medical History:   Diagnosis Date    Cerebrovascular accident (CVA) due to thrombosis of left middle cerebral artery (HCC) 07/29/2018    Chronic kidney disease     Coronary artery disease     Diabetes mellitus (HCC)     not on meds    Dialysis patient (HCC)     M-W-F    Fistula     left upper arm for hemodialyis    GERD (gastroesophageal reflux disease)     History of coronary artery stent placement     x3    Hypercholesteremia     Hyperlipidemia     Hypertension     Infectious viral hepatitis     B as child    Limb alert care status     no BP/IV left arm    Neuropathy     Obesity     Osteomyelitis (HCC)     last assessed 11/4/16-per son not currently    PVC's (premature ventricular contractions)     sees  Cardio    Stroke (HCC)     last weeof July 2018 [facility]    TIA (transient ischemic attack) 10/28/2018    Wears dentures     Wears glasses      Past Surgical History  Past Surgical History:   Procedure Laterality Date    ABDOMINAL SURGERY      CARDIAC CATHETERIZATION N/A 05/02/2022    Procedure: Cardiac Coronary Angiogram;  Surgeon: Sam Davis MD;  [facility];  Service: Cardiology    CARDIAC CATHETERIZATION N/A 05/02/2022    Procedure: Cardiac pci;  Surgeon: Sam Davis MD;  [facility];  Service: Cardiology    CARDIAC CATHETERIZATION  02/01/2023    Procedure: Cardiac catheterization;  Surgeon: Sam Davis MD;  [facility]:  "[facility];  Service: Cardiology    CARDIAC CATHETERIZATION N/A 02/01/2023    Procedure: Cardiac pci;  Surgeon: Sam Davis MD;  [facility];  Service: Cardiology    CARDIAC CATHETERIZATION N/A 02/01/2023    Procedure: Cardiac Coronary Angiogram;  Surgeon: Sam Davis MD;  [facility];  Service: Cardiology    CARDIAC CATHETERIZATION N/A 02/01/2023    Procedure: Cardiac other-IVUS;  Surgeon: Sam Davis MD;  [facility];  Service: Cardiology    CHOLECYSTECTOMY      Percutaneous    COLONOSCOPY      CYSTOSCOPY      HEMODIALYSIS ADULT  1/22/2024    HEMODIALYSIS ADULT  1/24/2024    IR LOWER EXTREMITY ANGIOGRAM  9/29/2023    IR LOWER EXTREMITY ANGIOGRAM  2/26/2024    OTHER SURGICAL HISTORY      \"stimulator to control bowel movements\"    ID ESOPHAGOGASTRODUODENOSCOPY TRANSORAL DIAGNOSTIC N/A 09/27/2016    Procedure: ESOPHAGOGASTRODUODENOSCOPY (EGD);  Surgeon: Adele Rowe MD;  Location: [facility];  Service: Gastroenterology    ID LAPAROSCOPY SURG CHOLECYSTECTOMY N/A 02/29/2016    Procedure: LAPAROSCOPIC CHOLECYSTECTOMY ;  Surgeon: Cliff Roman DO;  Location: [facility];  Service: General    ID SLCTV CATHJ 3RD+ ORD SLCTV ABDL PEL/LXTR BRNCH Left 9/29/2023    Procedure: Left leg angiogram with intervention;  Surgeon: Michelle Galvan MD;  [facility]: [facility];  Service: Vascular    ID SLCTV CATHJ 3RD+ ORD SLCTV ABDL PEL/LXTR BRNCH Left 2/26/2024    Procedure: Left leg angiogram antegrade access, left popliteal angioplasty;  Surgeon: Michelle Galvan MD;  [facility];  Service: Vascular    ROTATOR CUFF REPAIR Right     SPINAL CORD STIMULATOR IMPLANT      \"Medtronic interstim model # 3058- in lower back to control bowel movements-currently turned off-battery is dead\"    TOE AMPUTATION Right 10/28/2016    Procedure: 3RD TOE AMPUTATION ;  Surgeon: Anjel Salas DPM;  Location[facility];  Service:          07/19/24 1415   OT Last " Visit   OT Visit Date 07/19/24   Note Type   Note type Evaluation   Pain Assessment   Pain Assessment Tool 0-10   Pain Score 6   Pain Location/Orientation Location: Buttocks   Pain Onset/Description Onset: Ongoing;Descriptor: Sore   Effect of Pain on Daily Activities comfort, tolerance for functional mobility   Hospital Pain Intervention(s) Repositioned;Ambulation/increased activity;Emotional support  (EHOB waffle cushion provided)   Restrictions/Precautions   Weight Bearing Precautions Per Order No   Other Precautions Cognitive;Chair Alarm;Bed Alarm;Fall Risk;Pain;Multiple lines  (LUE limb alert , HD port)   Home Living   Type of Home House   Home Layout Two level;Performs ADLs on one level;Able to live on main level with bedroom/bathroom;Access  (pt is able to maintain single floor living with 0 RAFAT with access to bed room, full bathroom, kitchen and living room. Additional living space/bed and bath upstairs with 4 -5 steps to access. Family reports pt does not need to access if needed.)   Bathroom Shower/Tub Walk-in shower  (available on both floors)   Bathroom Toilet Raised   Bathroom Equipment Grab bars in shower;Grab bars around toilet;Shower chair   Bathroom Accessibility Accessible   Home Equipment Walker;Cane  (no AD used within the home typically)   Prior Function   Level of [place] Needs assistance with ADLs;Independent with functional mobility;Needs assistance with IADLS   Lives With Spouse;Family   Receives Help From Family;Other (Comment)  (spouse and supportive son. Was planend for OP PT but did not start yet)   IADLs Family/Friend/Other provides transportation;Family/Friend/Other provides meals;Family/Friend/Other provides medication management  (family manages all IADLs)   Falls in the last 6 months 1 to 4  (1 fall leading to admission, pt and family denies additional falls)   Vocational Retired   Comments At baseline, pt's spouse assists with showering, LB dressing. Pt completes majority of  toileting (I) but requires A for posterior care from spouse. (I) c BADLs. (I) without AD for hosuehold distance mobility, family denies furniture walking. Pt goes to HD M/W/F with son. is never unattented, has 24/7 assistance at home   Lifestyle   Autonomy PTA, pt is (I) c BADLs, A with LB ADLs. (I) without AD for mobility. lives c family. (-) diving, hx of 1 fall   Reciprocal Relationships supportive spouse and son   Service to Others retired   Intrinsic Gratification sitting outside on patio   General   Additional Pertinent History Pt admitted s/p fall down 5 steps. (-) injuries. Recently hospitalized for RLE DVT  complicated by bicytopenia. PMHx: ESRD on HD, Hx of CVA c residual memory deficits per family, PAD, DM2, obesity,   Family/Caregiver Present   (son and spouse present)   Subjective   Subjective Pt emotionally labile throughout session, emotional support provided. hx of this per EMR   ADL   Eating Assistance 5  Supervision/Setup   Grooming Assistance 5  Supervision/Setup   UB Bathing Assistance 4  Minimal Assistance   LB Bathing Assistance 3  Moderate Assistance   UB Dressing Assistance 4  Minimal Assistance   LB Dressing Assistance 2  Maximal Assistance   LB Dressing Deficit Thread RLE into underwear;Thread LLE into underwear;Increased time to complete;Supervision/safety;Requires assistive device for steadying;Steadying;Verbal cueing;Pull up over hips  (Min A steadying to pull over hips . pulls ankles > knees. A to thread BLE. cues for hand placements on Rw)   Toileting Assistance  2  Maximal Assistance   Toileting Deficit Increased time to complete;Supervison/safety;Verbal cueing;Steadying;Setup  (dependent for urinal management in stance)   Functional Assistance 3  Moderate Assistance   Additional Comments pt requires cues for initiation/sequencing during ADL tasks. limited by balance, trunk control, functional reach, fatigue   Bed Mobility   Supine to Sit 4  Minimal assistance   Additional items  Increased time required;LE management;Assist x 1  (cueing for sequencing and initiation of task. A for trunk management once at EOB)   Sit to Supine   (Pt seated OOB in recliner at end of session)   Additional Comments Pt has difficulty repositioning trunk while at EOB, requires cues to motor plan through task.   Transfers   Sit to Stand 2  Maximal assistance   Additional items Assist x 1;Increased time required;Verbal cues  (Rw)   Stand to Sit 2  Maximal assistance   Additional items Assist x 1;Increased time required;Verbal cues  (Rw)   Additional Comments cues for hand placements during transfers with limited carryover throughout session. Sit>stand transfers x 3 from EOB. Initial Max A x1 for first 2 trials, requires Min A for 3rd transfer with appropriate hand placements demonstrated   Functional Mobility   Functional Mobility 3  Moderate assistance  (variable Min A to Mod A)   Additional Comments short distance with Min A x1 , LOB x 2 requiring mod A x1, completed with chair follow. Cues for Rw management. Pt declines further mobility d/t high levels of fatigue   Additional items Rolling walker   Balance   Static Sitting Fair   Dynamic Sitting Fair -   Static Standing Poor +  (c RW)   Dynamic Standing Poor  (c RW)   Activity Tolerance   Activity Tolerance Patient limited by pain;Patient limited by fatigue   Medical Staff Made Aware MIKE Salcedo, care coordination c PT   Nurse Made Aware RN pre/post session   RUE Assessment   RUE Assessment WFL  (Full AROM, MMT 3+/5 based on functional assessment)   LUE Assessment   LUE Assessment WFL  (Full AROM, MMT 3+/5 based on functional assessment)   Hand Function   Gross Motor Coordination Functional   Fine Motor Coordination Impaired   Sensation   Light Touch No apparent deficits   Vision-Basic Assessment   Current Vision Wears glasses all the time   Perception   Motor Planning   (increased time for motor planning noted at times.)   Cognition   Overall Cognitive Status  "Impaired   Arousal/Participation Alert;Cooperative   Attention Attends with cues to redirect   Orientation Level Oriented to person;Oriented to place;Oriented to situation;Disoriented to time  (recalls hospital setting, unable to recall which one. Unable to recall year, month , date)   Memory Decreased recall of precautions;Decreased recall of recent events;Decreased short term memory   Following Commands Follows one step commands with increased time or repetition   Comments Pts family report baseline short term memory and \"difficulty with numbers\" s/p CVA in 2018. Pt overall flat affect during session, responds appropriately to questions but does not initiate conversation. Family confirms pt at baseline   Assessment   Limitation Decreased ADL status;Decreased UE strength;Decreased Safe judgement during ADL;Decreased cognition;Decreased endurance;Decreased self-care trans;Decreased high-level ADLs  (trunk control, balance, functional reach, activity tolerance, standing tolerance,)   Prognosis Good   Assessment Patient is a 64 y.o. male seen for OT evaluation at [facility] following admission on 7/17/2024  s/p Fall. Please see above for comprehensive list of comorbidities and significant PMHx impacting functional performance.  Upon initial evaluation, pt appears to be performing below baseline functional status.   Occupational performance is affected by the following deficits: decreased balance , decreased standing tolerance for self care tasks , decreased dynamic balance impacting functional reach, decreased trunk control , decreased activity tolerance , impaired memory , attention to task, impaired judgement and problem solving , and impaired safety awareness . Personal/Environmental factors impacting D/C include: Assistance needed for ADLs and functional mobility, High fall risk , and decreased recall of precautions . Supporting factors include: support system available and attitude towards " recovery Patient would benefit from OT services within the acute care setting to maximize level of functional independence in the following areas self-care transfers, functional mobility, and ADLs.  From OT standpoint, recommendation at time of D/C would be Level 2: moderate resource intensity .   Goals   Patient Goals to get stronger and walk better   Plan   Treatment Interventions ADL retraining;Functional transfer training;UE strengthening/ROM;Endurance training;Patient/family training;Cognitive reorientation;Equipment evaluation/education;Compensatory technique education;Activityengagement   Goal Expiration Date 07/29/24   OT Treatment Day 0   OT Frequency 3-5x/wk   Discharge Recommendation   Rehab Resource Intensity Level, OT II (Moderate Resource Intensity)   Additional Comments  The patient's raw score on the AM-PAC Daily Activity Inpatient Short Form is 16. A raw score of less than 19 suggests the patient may benefit from discharge to post-acute rehabilitation services. Please refer to the recommendation of the Occupational Therapist for safe discharge planning.   AM-PAC Daily Activity Inpatient   Lower Body Dressing 2   Bathing 2   Toileting 2   Upper Body Dressing 3   Grooming 3   Eating 4   Daily Activity Raw Score 16   Daily Activity Standardized Score (Calc for Raw Score >=11) 35.96   AM-PAC Applied Cognition Inpatient   Following a Speech/Presentation 2   Understanding Ordinary Conversation 3   Taking Medications 2   Remembering Where Things Are Placed or Put Away 3   Remembering List of 4-5 Errands 3   Taking Care of Complicated Tasks 2   Applied Cognition Raw Score 15   Applied Cognition Standardized Score 33.54   End of Consult   Education Provided Yes   Patient Position at End of Consult Bedside chair;Bed/Chair alarm activated;All needs within reach   Nurse Communication Nurse aware of consult   Goals established on initial evaluation in order to achieve pt's goal of maximizing functional  independence, walking further, getting stronger      Pt will complete UB ADLs Mod independent   for increased ADL independence within 10 days.     Pt will complete LB ADLs Min A  for increased ADL independence within 10 days.     Pt will complete toileting Min A  with use of DME for increased ADL independence within 10 days.     Pt will demonstrate proper body mechanics to complete self-care transfers and functional mobility with household distances  Supervision and use of LRAD for increased safety and functional independence within 10 days.     Pt will demonstrate standing tolerance of 6 min with Supervision and use of LRAD for increased activity tolerance during ADL/IADL tasks within 10 days.     Pt will complete bed mobility Supervision for increased independence in repositioning, pressure offloading, and managing comfort.     Pt will demonstrate proper body mechanics and fall prevention strategies during 100% of tx sessions for increased safety awareness during ADL/IADLs    Pt will demonstrate activity tolerance of 10 min in therapeutic tasks for increased participation in meaningful activities upon D/C.    Pt will demonstrate OOB sitting tolerance of 2-4 hr/day for increased activity tolerance and engagement in leisure activities within 10 days.       Pt benefited from co-session of skilled OT and PT therapists in order to most appropriately address functional deficits d/t decreased activity tolerance.  OT/PT objectives were addressed separately; please see PT note for specific goal areas targeted.    Priscila Guerra, OT

## 2024-07-19 NOTE — PHYSICAL THERAPY NOTE
Physical Therapy Cancellation Note       07/19/24 1020   Note Type   Note type Cancelled Session   Cancel Reasons Patient off floor/hemodialysis   Additional Comments PT consult received and chart reviewed. PT evaluation attempted, however pt currently undergoing bedside HD. Will hold and f/u as able and appropriate.       Tiffany Ortiz, PT, DPT   Available via [employer]  NPI # [id number]  [place] License - [id number]  7/19/2024

## 2024-07-19 NOTE — PHYSICAL THERAPY NOTE
PHYSICAL THERAPY EVALUATION & TREATMENT  DATE: 07/19/24  TIME: 3524-8010    NAME:  Asad Galloway  AGE:   64 y.o.  Mrn:   [id number]  Length Of Stay: 1    ADMIT DX:  Syncope and collapse [R55]  Dizziness [R42]  CHF (congestive heart failure) ([facility]) [I50.9]  Weakness [R53.1]  Elevated troponin [R79.89]  ESRD on dialysis ([facility]) [N18.6, Z99.2]  QT prolongation [R94.31]    Past Medical History:   Diagnosis Date    Cerebrovascular accident (CVA) due to thrombosis of left middle cerebral artery ([facility]) 07/29/2018    Chronic kidney disease     Coronary artery disease     Diabetes mellitus ([facility])     not on meds    Dialysis patient ([facility])     M-W-F    Fistula     left upper arm for hemodialyis    GERD (gastroesophageal reflux disease)     History of coronary artery stent placement     x3    Hypercholesteremia     Hyperlipidemia     Hypertension     Infectious viral hepatitis     B as child    Limb alert care status     no BP/IV left arm    Neuropathy     Obesity     Osteomyelitis ([facility])     last assessed 11/4/16-per son not currently    PVC's (premature ventricular contractions)     sees [facility]    Stroke ([facility])     last weeof July 2018 [facility]    TIA (transient ischemic attack) 10/28/2018    Wears dentures     Wears glasses      Past Surgical History:   Procedure Laterality Date    ABDOMINAL SURGERY      CARDIAC CATHETERIZATION N/A 05/02/2022    Procedure: Cardiac Coronary Angiogram;  Surgeon: Sam Davis MD;  [facility]: [facility];  Service: Cardiology    CARDIAC CATHETERIZATION N/A 05/02/2022    Procedure: Cardiac pci;  Surgeon: Sam Davis MD;  [facility];  Service: Cardiology    CARDIAC CATHETERIZATION  02/01/2023    Procedure: Cardiac catheterization;  Surgeon: Sam Davis MD;  [facility]: [facility];  Service: Cardiology    CARDIAC CATHETERIZATION N/A 02/01/2023    Procedure: Cardiac pci;  Surgeon: Sam Davis MD;  [facility]: [facility];  Service:  "Cardiology    CARDIAC CATHETERIZATION N/A 02/01/2023    Procedure: Cardiac Coronary Angiogram;  Surgeon: Sam Davis MD;  [facility];  Service: Cardiology    CARDIAC CATHETERIZATION N/A 02/01/2023    Procedure: Cardiac other-IVUS;  Surgeon: Sam Davis MD;  [facility];  Service: Cardiology    CHOLECYSTECTOMY      Percutaneous    COLONOSCOPY      CYSTOSCOPY      HEMODIALYSIS ADULT  1/22/2024    HEMODIALYSIS ADULT  1/24/2024    IR LOWER EXTREMITY ANGIOGRAM  9/29/2023    IR LOWER EXTREMITY ANGIOGRAM  2/26/2024    OTHER SURGICAL HISTORY      \"stimulator to control bowel movements\"    IA ESOPHAGOGASTRODUODENOSCOPY TRANSORAL DIAGNOSTIC N/A 09/27/2016    Procedure: ESOPHAGOGASTRODUODENOSCOPY (EGD);  Surgeon: Adele Rowe MD;  Location: [facility] GI LAB;  Service: Gastroenterology    IA LAPAROSCOPY SURG CHOLECYSTECTOMY N/A 02/29/2016    Procedure: LAPAROSCOPIC CHOLECYSTECTOMY ;  Surgeon: Cliff Roman DO;  Location: [facility] Main OR;  Service: General    IA SLCTV CATHJ 3RD+ ORD SLCTV ABDL PEL/LXTR BRNCH Left 9/29/2023    Procedure: Left leg angiogram with intervention;  Surgeon: Michelle Galvan MD;  Location: [facility] MAIN [facility];  Service: Vascular    IA SLCTV CATHJ 3RD+ ORD SLCTV ABDL PEL/LXTR BRNCH Left 2/26/2024    Procedure: Left leg angiogram antegrade access, left popliteal angioplasty;  Surgeon: Michelle aGlvan MD;  Location: [facility];  Service: Vascular    ROTATOR CUFF REPAIR Right     SPINAL CORD STIMULATOR IMPLANT      \"Medtronic interstim model # 3058- in lower back to control bowel movements-currently turned off-battery is dead\"    TOE AMPUTATION Right 10/28/2016    Procedure: 3RD TOE AMPUTATION ;  Surgeon: Anjel Salas DPM;  Location: [facility];  Service:        Performed at least 2 patient identifiers during session: Name, Birthday, ID bracelet, and Epic photo     07/19/24 1445   PT Last Visit   PT Visit Date 07/19/24   Note Type   Note type Evaluation  (& treatment) "   Pain Assessment   Pain Assessment Tool 0-10   Pain Score 6   Pain Location/Orientation Location: Buttocks   Pain Onset/Description Onset: Ongoing;Descriptor: Sore   Effect of Pain on Daily Activities limits tolerance of functional mobility, limits comfort   Patient's Stated Pain Goal No pain   Hospital Pain Intervention(s) Repositioned;Ambulation/increased activity;Emotional support  (air/waffle cushion applied to chair)   Multiple Pain Sites No   Restrictions/Precautions   Weight Bearing Precautions Per Order No   Other Precautions Cognitive;Chair Alarm;Bed Alarm;Limb alert;Fall Risk;Pain;Hard of hearing  (L UE limb alert)   Home Living   Type of Home House   Home Layout Multi-level;Performs ADLs on one level;Able to live on main level with bedroom/bathroom  (0 RAFAT through basement level, able to maintain single level living on that level with bedroom/bathroom/kitchen; also has full bed/bath/kitchen on 2nd level)   Bathroom Shower/Tub Walk-in shower   Bathroom Toilet Raised   Bathroom Equipment Grab bars in shower;Shower chair;Grab bars around toilet   Bathroom Accessibility Accessible   Home Equipment Walker;Cane   Prior Function   Level of [place] Independent with functional mobility;Needs assistance with ADLs;Needs assistance with IADLS   Lives With Spouse;Son  (family reports that at least 1 person is home with pt at all times; son works from home)   Receives Help From Family  (pt's son reports that pt was scheduled for OPPT 7/18/2024 for strengthening and gait training after having to wear darco shoe for so long his gait was changed)   IADLs Family/Friend/Other provides transportation;Family/Friend/Other provides meals;Family/Friend/Other provides medication management   Falls in the last 6 months 1 to 4  (only 1 fall leading to current admission)   Vocational Retired   Comments At baseline, pt's spouse provides assistance for all ADLs, spouse and son assist with IADLs, family provides transportation to  "HD 3x/wk and stays with him t/o HD session. Pt ambulates household distances with no AD, will occasionally use RW vs cane. Family reports NO NEED for pt to access 2nd floor of home.   General   Additional Pertinent History Pt is a 64 yr old male admitted 7/17/24 s/p fall backwards on step, weakness, R LE DVT. Recent hospitalization for RLE DVT, was d/c'd same day.   Family/Caregiver Present Yes  (son and spouse present)   Cognition   Overall Cognitive Status Impaired   Arousal/Participation Cooperative   Orientation Level Oriented to person;Oriented to place;Oriented to situation;Disoriented to time  (pt able to recall hospital but not which one; unable to recall any aspect of date)   Memory Decreased short term memory;Decreased recall of recent events;Decreased recall of precautions   Following Commands Follows one step commands with increased time or repetition   Comments Pt's son reports baseline STM impairments since CVA in 2018.   Subjective   Subjective \"Am I dying?\"   RUE Assessment   RUE Assessment WFL   LUE Assessment   LUE Assessment WFL   RLE Assessment   RLE Assessment WFL   Strength RLE   R Hip Flexion 3+/5   R Knee Flexion 3+/5   R Knee Extension 3+/5   R Ankle Dorsiflexion 3+/5   LLE Assessment   LLE Assessment WFL   Strength LLE   L Hip Flexion 4-/5   L Knee Flexion 4/5   L Knee Extension 4/5   L Ankle Dorsiflexion 4/5   Coordination   Movements are Fluid and Coordinated 0   Sensation WFL   Light Touch   RLE Light Touch Grossly intact   LLE Light Touch Grossly intact   Bed Mobility   Supine to Sit 4  Minimal assistance   Additional items Assist x 1;HOB elevated;Bedrails;Increased time required;Verbal cues  (trunk management; increased time and cues for sequencing and initiation of task)   Sit to Supine   (NT as pt was left seated OOB in recliner chair at end of session)   Additional Comments Pt with overall fair sitting balance at EOB, limited dynamic task tolerance. Pt displays learned dependence on " spouse for completion of LBD tasks prior to mobility.   Transfers   Sit to Stand 2  Maximal assistance   Additional items Assist x 1;Increased time required;Verbal cues  (RW)   Stand to Sit 2  Maximal assistance   Additional items Assist x 1;Impulsive;Verbal cues  (RW)   Additional Comments STS from EOB x2 trials with RW and MAXA. 3rd trial STS from EOB with TEVIN, after improved application of cues for appropriate body mechanics.   Ambulation/Elevation   Gait pattern Improper Weight shift;Forward Flexion;Steppage;Short stride;Ataxia;Decreased hip extension;Decreased heel strike   Gait Assistance 3  Moderate assist   Additional items Assist x 1;Verbal cues;Tactile cues   Assistive Device Rolling walker   Distance 5ft  (from EOB to recliner chair, see additional treatment section below for further ambulation trial after functional seated rest break)   Balance   Static Sitting Fair   Dynamic Sitting Fair -   Static Standing Poor +  (w/ RW)   Dynamic Standing Poor  (w/ RW)   Ambulatory Poor  (w/ RW)   Endurance Deficit   Endurance Deficit Yes   Activity Tolerance   Activity Tolerance Patient limited by fatigue;Patient limited by pain   Medical Staff Made Aware Spoke with CM, OT, RN   Assessment   Prognosis Good   Problem List Decreased strength;Decreased endurance;Impaired balance;Decreased mobility;Decreased cognition;Impaired judgement;Decreased safety awareness;Impaired hearing;Pain   Assessment Pt seen for PT evaluation for mobility assessment & discharge needs. Pt admitted 7/17/2024 s/p fall backwards on step, weakness, R LE DVT. Comorbidities affecting pt's fnxl performance include: CVA, CKD, CAD, DM, ESRD on HD, HTN, Hep B, obesity, neuropathy, spinal cord stimulator, R toe amputations, falls. During PT IE, pt requires TEVIN for bed mobility, MAXA for STS transfer from EOB with RW x2, and MODA + RW for ambulatory transition from EOB to recliner chair ~5ft. During addt'l treatment time pt progresses to ambulate an  "additional 14ft with RW and variable MIN-MODA + chair follow. Pt displays above outlined functional impairments & limitations, and presents below his baseline level of functional mobility. The AM-PAC & Barthel Index outcome tools were used to assist in determining pt safety w/ mobility/self care & appropriate d/c recommendations, see above for scores. Pt is at risk of falls d/t multiple comorbidities, h/o falls, impaired balance, impaired cognition, impaired insight/safety awareness, use of ambulatory aid, varying levels of pain , acuity of medical illness, ongoing medical treatment of primary dx, and polypharmacy. Pt's clinical presentation is currently unstable/unpredictable as seen in pt's presentation of vital sign response, changing level of pain, varying levels of cognitive performance, increased fall risk, new onset of impairment of functional mobility, decreased endurance, and new onset of weakness. Pt will benefit from continued PT services in order to address impairments, decrease risk of falls, maximize independence w/ fnxl mobility, & ensure safety w/ mobility for transition to next level of care. Based on pt presentation & impairments, pt would most appropriately benefit from Level 1 (maximal PT intensity) resources upon d/c.   Goals   Patient Goals \"to get stronger and walk better\"   [facility] Expiration Date 08/02/24   Short Term Goal #1 Patient PT goals established in order to address pt self reported goal of \"to get stronger and walk better\". Pt will: complete all bed mobility in flat bed at JACKY level in order to promote increased OOB functional mobility and simulate home environment; complete all transfers with LRAD at JACKY level in order to increase safety with functional mobility; ambulate >80ft with LRAD and S in order to increase safety with household distance functional mobility; negotiate 5-6 stairs with HR assist and TEVIN in order to facilitate safe access to all areas of his home with family " assist; improve R LE strength to >/= 4+/5 MMT t/o in order to increase safety with functional mobility and decrease risk of falls; demonstrate understanding and independence with LE strengthening HEP; improve ambulatory balance to >/= good grade with LRAD in order to promote safety and increased independence with mobility; tolerate >3hrs OOB in upright position, in order to improve muscular endurance and respiratory status; improve AM-PAC score to >/= 17/24 in order to increase independence with mobility and decrease burden of care; improve Barthel Index score to >/= 45/100 in order to increase independence and decrease risk of falls.   PT Treatment Day 1   Plan   Treatment/Interventions Functional transfer training;LE strengthening/ROM;Elevations;Therapeutic exercise;Endurance training;Cognitive reorientation;Patient/family training;Equipment eval/education;Bed mobility;Gait training;Spoke to nursing;Spoke to case management   PT Frequency 3-5x/wk   Discharge Recommendation   Rehab Resource Intensity Level, PT I (Maximum Resource Intensity)   Equipment Recommended Walker   Walker Package Recommended Wheeled walker   Change/add to Walker Package? No   AM-PAC Basic Mobility Inpatient   Turning in Flat Bed Without Bedrails 3   Lying on Back to Sitting on Edge of Flat Bed Without Bedrails 2   Moving Bed to Chair 2   Standing Up From Chair Using Arms 2   Walk in Room 2   Climb 3-5 Stairs With Railing 1   Basic Mobility Inpatient Raw Score 12   Basic Mobility Standardized Score 32.23   [facility] Highest Level Of Mobility   [facility] Goal 4: Move to chair/commode   -[facility] Achieved 6: Walk 10 steps or more   Modified Evangelina Scale   Modified Steele Scale 4   Barthel Index   Feeding 5   Bathing 0   Grooming Score 0   Dressing Score 5   Bladder Score 5   Bowels Score 10   Toilet Use Score 5   Transfers (Bed/Chair) Score 5   Mobility (Level Surface) Score 0   Stairs Score 0   Barthel Index Score 35   Additional Treatment  Session   Start Time 1430   End Time 1445   Treatment Assessment Pt is agreeable to participate in additional gait/functional mobility training post IE. Pt progresses to complete x2 additional STS transfers from recliner chair with TEVIN, and then progresses to ambulate an additional 14ft with RW and variable MIN-MODA + chair follow -- primarily TEVIN however MODA needed with occurrence of x2 LOB posterior/right. Gait deviations: dec heel strike and toe off, steppage gait, fwd flexion, short stride. Pt notably fatigued post mobility training. At end of session pt was left seated OOB in recliner chair with alarm engaged and needs in reach, family in room. Regarding functional mobility, pt remains below his baseline level of functional mobility and is unsafe for return to home at current functional status. A d/c to home will likely result in additional falls, hospital readmission due to falls, and subsequently an increase in healthcare costs and resources. Continue to recommend Level 1 (maximal PT intensity) resources once medically cleared for d/c from the acute care setting. Will continue skilled PT POC as able and appropriate to address functional impairments and progress towards therapy goals. Next session, plan for intervention of increased transfer and gait training as able.   Equipment Use RW   Additional Treatment Day 1   End of Consult   Patient Position at End of Consult Bedside chair;Bed/Chair alarm activated;All needs within reach  (family in room)     This session, pt required and most appropriately benefited from partial or full skilled PT/OT co-eval due to extensive physical assistance of SKILLED therapists, cognitive-communication impairments, significant regression from baseline level of mobility, decreased activity tolerance, and unpredictable medical and/or functional status. PT and OT goals were addressed separately as seen in documentation.    Based on patient's [facility] Highest Level of  Mobility scores today, patient currently has a goal of -Health system Levels: 6: WALK 10 STEPS OR MORE, to be completed with RN staffing each shift, in order to improve overall activity tolerance and mobility, combat hospital related deconditioning, and maximize outcomes for d/c from the acute care setting.     The patient's AM-PAC Basic Mobility Inpatient Short Form Raw Score is 12. A Raw score of less than or equal to 16 suggests the patient may benefit from discharge to post-acute rehabilitation services. Please also refer to the recommendation of the Physical Therapist for safe discharge planning.      Tiffany Ortiz PT, DPT   Available via jiffstore  NPI # [id number]  [place] License - [id number]  7/19/2024

## 2024-07-19 NOTE — CASE MANAGEMENT
Case Management Discharge Planning Note    Patient name Asad Galloway  Location S /S -01 MRN [id number]  : 1960 Date 2024       Current Admission Date: 2024  Current Admission Diagnosis:Syncope and collapse   Patient Active Problem List    Diagnosis Date Noted Date Diagnosed    Acute deep vein thrombosis (DVT) of right lower extremity ([facility]) 2024     Bicytopenia 2024     Obesity (BMI 30.0-34.9) 2024     QT prolongation 2024     Acute deep vein thrombosis (DVT) of right peroneal vein ([facility]) 2024     Acute cough 2024     Right ankle pain 2024     CHF (congestive heart failure) ([facility]) 2024     PAD (peripheral artery disease) ([facility]) 2024     Type 2 diabetes mellitus with diabetic neuropathy, unspecified whether long term insulin use ([facility]) 2024     Ulcer of left heel, limited to breakdown of skin ([facility]) 2024     Hypertensive heart and chronic kidney disease with heart failure and with stage 5 chronic kidney disease, or end stage renal disease ([facility]) 2024     Fall 2024     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 ([facility]) 2024     Chronic systolic heart failure ([facility]) 2024     Hypotension 2023     Edema of left lower extremity 2023     Rectal bleeding 2023     Nonhealing ulcer of heel ([facility]) 2023     Elevated troponin 2023     Presence of external cardiac defibrillator 2023     Diabetic polyneuropathy associated with type 2 diabetes mellitus ([facility]) 2023     Absence of toe of right foot ([facility]) 2023     Hammer toes of both feet 2023     Ischemic cardiomyopathy 2023     Moderate AS (aortic stenosis) 2023     Mood disorder ([facility]) 2023     Old myocardial infarction 2023     Hyponatremia 2023     Syncope and collapse 2023     SOB (shortness of breath) 10/21/2022     Left arm swelling  10/21/2022     CAD, multiple vessel 07/14/2022     Electrolyte abnormality 05/02/2022     Chest pain 05/01/2022     Generalized weakness 04/19/2022     Primary hypertension 04/06/2022     Penetrating foot wound, left, initial encounter 01/19/2022     Emotional lability 01/19/2022     History of 2019 novel coronavirus disease (COVID-19) 12/26/2020     ESRD on dialysis ([facility]) 12/26/2020     Anemia 10/05/2020     S/P arteriovenous (AV) fistula creation 04/27/2020     Pre-kidney transplant, listed 04/27/2020     Urge incontinence 12/20/2019     Anxiety associated with depression 02/28/2019     History of stroke in adulthood 10/04/2018     Abnormal EEG 09/24/2018     Other constipation 08/17/2018     GERD (gastroesophageal reflux disease) 08/13/2018     Elevated alkaline phosphatase level 08/03/2018     Nephrotic range proteinuria 03/28/2018     Type 2 diabetes mellitus ([facility]CC) 02/26/2016     Dizziness 02/26/2016     Hyperlipidemia 02/26/2016     Antiplatelet or antithrombotic long-term use 02/26/2016       LOS (days): 1  Geometric Mean LOS (GMLOS) (days): 3.9  Days to GMLOS:3     OBJECTIVE:  Risk of Unplanned Readmission Score: 29.94         Current admission status: Inpatient   Preferred Pharmacy:   [id number] - [place] - [address]  [address] [address]  Phone: [phone] Fax: [phone]    Primary Care Provider: Raj Auguste DO    Primary Insurance: MEDICARE  Secondary Insurance: [facility]    DISCHARGE DETAILS:    Discharge planning discussed with:: Patient, spouse, and son at bedside  Freedom of Choice: Yes  Comments - Freedom of Choice: CM advised by PT/OT that patient is recommended for IP rehab at discharge -- CM spoke with patient and family at length to discuss same. Preference would be [facility] for extensive therapies with in-house dialysis, if possible. CM reviewed strict criteria for acute rehab, particularly pertaining to medical criteria. Patient/family  continue to strongly advocate for acute level-of-care.     [place] referral placed for STR as a back-up if needed -- Family confirmed they would provide transportation [facility] rehab stay. They had originally hoped for a facility that can accommodate in-house dialysis, but they do NOT want [facility] due to the distance from their home. List of contacted providers given at bedside for their review -- If needed, [facility].              Of note, patient is medically stable for discharge pending safe discharge planning.                           Treatment Team Recommendation: Acute Rehab, Short Term Rehab  Discharge Destination Plan:: Acute Rehab, Short Term Rehab

## 2024-07-20 LAB
GLUCOSE SERPL-MCNC: 114 MG/DL (ref 65–140)
GLUCOSE SERPL-MCNC: 119 MG/DL (ref 65–140)
GLUCOSE SERPL-MCNC: 84 MG/DL (ref 65–140)
GLUCOSE SERPL-MCNC: 92 MG/DL (ref 65–140)

## 2024-07-20 PROCEDURE — 99231 SBSQ HOSP IP/OBS SF/LOW 25: CPT | Performed by: INTERNAL MEDICINE

## 2024-07-20 PROCEDURE — 82948 REAGENT STRIP/BLOOD GLUCOSE: CPT

## 2024-07-20 RX ORDER — MORPHINE SULFATE 4 MG/ML
4 INJECTION, SOLUTION INTRAMUSCULAR; INTRAVENOUS
Status: DISCONTINUED | OUTPATIENT
Start: 2024-07-20 | End: 2024-07-21 | Stop reason: HOSPADM

## 2024-07-20 RX ADMIN — SACUBITRIL AND VALSARTAN 1 TABLET: 24; 26 TABLET, FILM COATED ORAL at 22:10

## 2024-07-20 RX ADMIN — APIXABAN 5 MG: 5 TABLET, FILM COATED ORAL at 17:03

## 2024-07-20 RX ADMIN — DIVALPROEX SODIUM 125 MG: 125 CAPSULE ORAL at 08:51

## 2024-07-20 RX ADMIN — Medication 3 MG: at 22:11

## 2024-07-20 RX ADMIN — METOPROLOL TARTRATE 50 MG: 50 TABLET, FILM COATED ORAL at 22:11

## 2024-07-20 RX ADMIN — APIXABAN 5 MG: 5 TABLET, FILM COATED ORAL at 08:51

## 2024-07-20 RX ADMIN — ATORVASTATIN CALCIUM 40 MG: 40 TABLET, FILM COATED ORAL at 17:03

## 2024-07-20 RX ADMIN — PRASUGREL 5 MG: 10 TABLET, FILM COATED ORAL at 14:21

## 2024-07-20 RX ADMIN — DIVALPROEX SODIUM 125 MG: 125 CAPSULE ORAL at 22:12

## 2024-07-20 NOTE — QUICK NOTE
Chart reviewed.  Vitals reviewed.  Last hemodialysis session was yesterday.  Next dialysis session will be Monday if still in-house.  Please call with questions in the interim.

## 2024-07-20 NOTE — PLAN OF CARE

## 2024-07-20 NOTE — PROGRESS NOTES
[facility]  Progress Note  Name: Asad Galloway I  MRN: [id number]  Unit/Bed#: S -01 I Date of Admission: 7/17/2024   Date of Service: 7/20/2024 I Hospital Day: 2    Assessment & Plan   * Fall  Assessment & Plan  After returning home from being discharged from the hospital day prior, sustained a mechanical fall while walking up steps with subsequent fall backwards and reported head strike-> lifted up and assisted by son to his room at the time. Family denied syncope  Presented to the ED after inability to lift himself off a toilet seat with weakness  CT of head and cervical spine unremarkable for acute injury/abnormalities - remainder of traumatic radiology also unremarkable for acute injuries  Orthostatics negative  TSH WNL  Echocardiogram with improved EF 45%  Seen by Cardiology, cardiac source not suspected  Pending PT eval, anticipating need for STR, family agreeable to rehab. Currently awaiting rehab.    Acute deep vein thrombosis (DVT) of right lower extremity (HCC)  Assessment & Plan  Recently diagnosed on hospitalization earlier this month (just discharged yesterday)  Continue Eliquis    PAD (peripheral artery disease) (HCC)  Assessment & Plan  Follows outpatient podiatry  Status post right toe amputation  S/p arteriogram earlier this year with a multitude of LLE vascular intervention  Continue Effient/statin    Chronic systolic heart failure (HCC)  Assessment & Plan  Last EF of 40% in December 2023 with apical akinesis and multi-regional LV hypokinesis, mild-moderate AR, moderate -severe AS, moderate MR, and mild TR -> in light of primary/presenting issue, check updated echocardiogram   Fluid management via dialysis  Continue Toprol-XL for beta-blockade - c/w Entresto  Sodium/fluid restriction  Monitor/replete electrolyte deficiencies if present  Repeat echo results reviewed. EF 45%    Elevated troponin  Assessment & Plan  Presenting troponin of 421 (was 97 two weeks  ago)  Possibly non-MI troponin elevation in the setting of fall with underlying CHF and ESRD on HD    Troponin flat 421->414->300  No acute cardiac interventions    ESRD on dialysis (HCC)  Assessment & Plan  Continue routine hemodialysis per nephrology (typically M/W/F)  On Sensipar/Rocaltrol    History of stroke in adulthood  Assessment & Plan  C/w Effient/statin    Hyperlipidemia  Assessment & Plan  Continue statin    Type 2 diabetes mellitus (HCC)  Assessment & Plan  Lab Results   Component Value Date    HGBA1C 5.9 (H) 2024     Diet controlled at home  Currently seems to be diet controlled as well.    Patient currently on sliding scale insulin but not requiring any insulin.             VTE Pharmacologic Prophylaxis:   Pharmacologic: Apixaban (Eliquis)  Mechanical VTE Prophylaxis in Place: Yes    Patient Centered Rounds: I have performed bedside rounds with nursing staff today.    Discussions with Specialists or Other Care Team Provider:    Education and Discussions with Family / Patient: pt    Time Spent for Care: 20 minutes.  More than 50% of total time spent on counseling and coordination of care as described above.    Current Length of Stay: 2 day(s)    Current Patient Status: Inpatient   Certification Statement: The patient will continue to require additional inpatient hospital stay due to pending placement    Discharge Plan: Pending placement    Code Status: Level 1 - Full Code      Subjective:   Pt seen and examined by me in morning.  Denies specific complaints.  Said he feels little stronger compared to yesterday.    Objective:     Vitals:   Temp (24hrs), Av.9 °F (36.6 °C), Min:97.3 °F (36.3 °C), Max:98.3 °F (36.8 °C)    Temp:  [97.3 °F (36.3 °C)-98.3 °F (36.8 °C)] 98.3 °F (36.8 °C)  HR:  [62-74] 69  Resp:  [16-20] 16  BP: ()/(47-92) 127/63  SpO2:  [98 %-100 %] 100 %  Body mass index is 34.52 kg/m².     Input and Output Summary (last 24 hours):       Intake/Output Summary (Last 24 hours) at  7/20/2024 1508  Last data filed at 7/20/2024 0756  Gross per 24 hour   Intake 540 ml   Output 100 ml   Net 440 ml       Physical Exam:     Physical Exam    Constitutional: Pt appears comfortable. Not in any acute distress.  Cardiovascular: Normal rate, regular rhythm, normal heart sounds.  No murmur heard.  Pulmonary/Chest: Effort normal, air entry b/l equal. No respiratory distress. Pt has no wheezes or crackles.   Abdominal: Soft. Non-distended, Non-tender. Bowel sounds are normal.   Neurological: awake, alert.  Psychiatric: normal mood and affect.      Additional Data:     Labs:    Results from last 7 days   Lab Units 07/19/24  0505   WBC Thousand/uL 4.95   HEMOGLOBIN g/dL 8.8*   HEMATOCRIT % 27.3*   PLATELETS Thousands/uL 130*   SEGS PCT % 62   LYMPHO PCT % 19   MONO PCT % 15*   EOS PCT % 2     Results from last 7 days   Lab Units 07/19/24  0505 07/16/24  0445 07/15/24  0623   SODIUM mmol/L 130*   < > 133*   POTASSIUM mmol/L 4.7   < > 6.0*   CHLORIDE mmol/L 92*   < > 96   CO2 mmol/L 27   < > 23   CO2, I-STAT   --    < >  --    BUN mg/dL 40*   < > 70*   CREATININE mg/dL 6.06*   < > 8.75*   ANION GAP mmol/L 11   < > 14*   CALCIUM mg/dL 8.0*   < > 8.3*   ALBUMIN g/dL  --   --  3.6   TOTAL BILIRUBIN mg/dL  --   --  0.62   ALK PHOS U/L  --   --  99   ALT U/L  --   --  6*   AST U/L  --   --  6*   GLUCOSE RANDOM mg/dL 84   < > 101    < > = values in this interval not displayed.     Results from last 7 days   Lab Units 07/14/24  1901   INR  1.01     Results from last 7 days   Lab Units 07/20/24  1037 07/20/24  0720 07/19/24  2136 07/19/24  1607 07/19/24  1113 07/19/24  0711 07/19/24  0057 07/18/24  2130 07/18/24  1613 07/18/24  1112 07/18/24  0705 07/17/24  2108   POC GLUCOSE mg/dl 114 84 101 119 100 84 108 123 89 91 92 100     Results from last 7 days   Lab Units 07/14/24  1901   HEMOGLOBIN A1C % 5.9*     Results from last 7 days   Lab Units 07/15/24  0450 07/14/24  1901   PROCALCITONIN ng/ml 0.31* 0.33*           * I  Have Reviewed All Lab Data Listed Above.  * Additional Pertinent Lab Tests Reviewed: All Labs For Current Hospital Admission Reviewed    Imaging:    Imaging Reports Reviewed Today Include:   Imaging Personally Reviewed by Myself Includes:      Recent Cultures (last 7 days):           Last 24 Hours Medication List:   Current Facility-Administered Medications   Medication Dose Route Frequency Provider Last Rate    acetaminophen  650 mg Oral Q6H PRN Catia Mccauley MD      apixaban  5 mg Oral BID Catia Mccauley MD      atorvastatin  40 mg Oral Daily With Dinner Catia Mccauley MD      calcitriol  0.5 mcg Oral Once per day on Monday Wednesday Friday Catia Mccauley MD      cinacalcet  120 mg Oral Once per day on Monday Wednesday Friday Catia Mccauley MD      divalproex sodium  125 mg Oral Q12H MICH Catia Mccauley MD      insulin lispro  1-6 Units Subcutaneous 4x Daily (AC & HS) Catia Mccauley MD      iron sucrose  50 mg Intravenous Weekly Jessica Bey, CRNP 50 mg (07/19/24 1055)    meclizine  12.5 mg Oral Q8H PRN Catia Mccauley MD      melatonin  3 mg Oral HS Christiano Salas MD      metoprolol tartrate  50 mg Oral Q12H MICH Nae John MD      prasugrel  5 mg Oral Daily With Lunch Nae John MD      sacubitril-valsartan  1 tablet Oral HS Nae John MD      trimethobenzamide  200 mg Intramuscular Q6H PRN Catia Mccauley MD          Today, Patient Was Seen By: Modesto Tovar MD    ** Please Note: Dictation voice to text software may have been used in the creation of this document. **

## 2024-07-20 NOTE — ASSESSMENT & PLAN NOTE
Lab Results   Component Value Date    HGBA1C 5.9 (H) 07/14/2024     Diet controlled at home  Currently seems to be diet controlled as well.    Patient currently on sliding scale insulin but not requiring any insulin.

## 2024-07-20 NOTE — ASSESSMENT & PLAN NOTE
After returning home from being discharged from the hospital day prior, sustained a mechanical fall while walking up steps with subsequent fall backwards and reported head strike-> lifted up and assisted by son to his room at the time. Family denied syncope  Presented to the ED after inability to lift himself off a toilet seat with weakness  CT of head and cervical spine unremarkable for acute injury/abnormalities - remainder of traumatic radiology also unremarkable for acute injuries  Orthostatics negative  TSH WNL  Echocardiogram with improved EF 45%  Seen by Cardiology, cardiac source not suspected  Pending PT eval, anticipating need for STR, family agreeable to rehab. Currently awaiting rehab.

## 2024-07-21 VITALS
HEIGHT: 68 IN | TEMPERATURE: 97.9 F | OXYGEN SATURATION: 100 % | BODY MASS INDEX: 34.4 KG/M2 | RESPIRATION RATE: 17 BRPM | HEART RATE: 74 BPM | DIASTOLIC BLOOD PRESSURE: 57 MMHG | WEIGHT: 227 LBS | SYSTOLIC BLOOD PRESSURE: 118 MMHG

## 2024-07-21 LAB
ATRIAL RATE: 68 BPM
GLUCOSE SERPL-MCNC: 92 MG/DL (ref 65–140)
GLUCOSE SERPL-MCNC: 92 MG/DL (ref 65–140)
P AXIS: 44 DEGREES
PR INTERVAL: 234 MS
QRS AXIS: -9 DEGREES
QRSD INTERVAL: 164 MS
QT INTERVAL: 486 MS
QTC INTERVAL: 516 MS
T WAVE AXIS: 18 DEGREES
VENTRICULAR RATE: 68 BPM

## 2024-07-21 PROCEDURE — 99239 HOSP IP/OBS DSCHRG MGMT >30: CPT | Performed by: PHYSICIAN ASSISTANT

## 2024-07-21 PROCEDURE — 82948 REAGENT STRIP/BLOOD GLUCOSE: CPT

## 2024-07-21 PROCEDURE — 93010 ELECTROCARDIOGRAM REPORT: CPT | Performed by: INTERNAL MEDICINE

## 2024-07-21 RX ORDER — LANOLIN ALCOHOL/MO/W.PET/CERES
3 CREAM (GRAM) TOPICAL
Qty: 30 TABLET | Refills: 0 | Status: SHIPPED | OUTPATIENT
Start: 2024-07-21

## 2024-07-21 RX ORDER — METOPROLOL TARTRATE 50 MG/1
50 TABLET, FILM COATED ORAL EVERY 12 HOURS SCHEDULED
Qty: 60 TABLET | Refills: 0 | Status: CANCELLED | OUTPATIENT
Start: 2024-07-21

## 2024-07-21 RX ORDER — MECLIZINE HCL 12.5 MG/1
12.5 TABLET ORAL EVERY 8 HOURS PRN
Qty: 30 TABLET | Refills: 0 | Status: SHIPPED | OUTPATIENT
Start: 2024-07-21

## 2024-07-21 RX ADMIN — APIXABAN 5 MG: 5 TABLET, FILM COATED ORAL at 09:07

## 2024-07-21 RX ADMIN — METOPROLOL TARTRATE 50 MG: 50 TABLET, FILM COATED ORAL at 09:07

## 2024-07-21 RX ADMIN — PRASUGREL 5 MG: 10 TABLET, FILM COATED ORAL at 11:42

## 2024-07-21 RX ADMIN — DIVALPROEX SODIUM 125 MG: 125 CAPSULE ORAL at 09:07

## 2024-07-21 NOTE — DISCHARGE SUMMARY
[facility]  Discharge- Asad Galloway 1960, 64 y.o. male MRN: [id number]  Unit/Bed#: S -01 Encounter: [id number]  Primary Care Provider: Raj Auguste DO   Date and time admitted to hospital: 7/17/2024  9:07 AM    * Fall  Assessment & Plan  After returning home from being discharged on 07/16 from [facility] pt sustained a mechanical fall on 07/17 while walking up steps w/ subsequent fall backwards and reported head strike. Fall witnessed by family; no syncope. Presented to the ED after inability to lift himself off a toilet seat with weakness.   CT head/ c-spine: unremarkable for acute injury/abnormalities; remainder of traumatic radiology also unremarkable for acute injuries  Orthostatics negative  TSH WNL  Echocardiogram w/ improved EF 45%  Seen by Cardiology; cardiac source not suspected  PT recommend STR; family declined; ambulatory referrals given for outpt PT/OT     Elevated troponin  Assessment & Plan  Presenting troponin of 421 (was 97 two weeks ago)  Suspect non-MI troponin elevation in the setting of fall with underlying CHF and ESRD on HD    Troponin flat 421->414->300  No acute cardiac interventions  At d/c, CP free; no atypical CP symptoms     QT prolongation  Assessment & Plan  QT of 486 on EKG (QTc of 516)  Limit/avoid QT prolonging agents as possible  Chronically elevated looking back for more than a year  Outpt monitoring     Obesity (BMI 30.0-34.9)  Assessment & Plan  BMI of 34.66  Lifestyle/diet modifications    Bicytopenia  Assessment & Plan  Chronic anemia/thrombocytopenia noted  Monitor CBC    Recent Labs     07/19/24  0505   WBC 4.95   *       Acute deep vein thrombosis (DVT) of right lower extremity (HCC)  Assessment & Plan  Recently diagnosed on hospitalization earlier this month  Continue Eliquis    PAD (peripheral artery disease) (HCC)  Assessment & Plan  Known to outpatient podiatry  S/p right toe amputation  S/p arteriogram earlier this year with rachel  multitude of LLE vascular intervention  Continue Effient/statin; outpt f/u     Chronic systolic heart failure (HCC)  Assessment & Plan  Last EF of 40% in 12/2023 w/ apical akinesis and multi-regional LV hypokinesis, mild-moderate AR, moderate -severe AS, moderate MR, and mild TR  Repeat ECHO: w/ improved EF of 45%   Fluid management via dialysis  Continue Toprol-XL & Entresto  Sodium/fluid restriction    Hyponatremia  Assessment & Plan  Recent Labs     07/19/24  0505   SODIUM 130*     In the setting of ESRD on HD    ESRD on dialysis (HCC)  Assessment & Plan  Continue routine hemodialysis per nephrology (M/W/F)  On Sensipar/Rocaltrol    History of stroke in adulthood  Assessment & Plan  C/w Effient/statin    Hyperlipidemia  Assessment & Plan  Continue statin    Type 2 diabetes mellitus (HCC)  Assessment & Plan  Lab Results   Component Value Date    HGBA1C 5.9 (H) 07/14/2024     Diet controlled at home  SSI used during hospital course       Medical Problems       Resolved Problems  Date Reviewed: 7/19/2024   None       Discharging Physician / Practitioner: Beatriz Hung PA-C  PCP: Raj Auguste DO  Admission Date:   Admission Orders (From admission, onward)       Ordered        07/18/24 1655  INPATIENT ADMISSION  Once            07/17/24 1031  Place in Observation  Once                          Discharge Date: 07/21/24    Consultations During Hospital Stay:  Cardiology   Nephrology     Procedures Performed:   None    Significant Findings / Test Results:   Please see A/P above   ECHO as above     Incidental Findings:   None      Test Results Pending at Discharge (will require follow up):   None     Outpatient Tests Requested:  Will have routine lab work completed as an outpatient during his hemodialysis treatment    Complications: None    Reason for Admission: Ambulatory dysfunction after fall    Hospital Course:   Asad Galloway is a 64 y.o. male patient who originally presented to the hospital on 7/17/2024 due to  ambulatory dysfunction after fall.  Patient was admitted to [facility] was discharged with a diagnosis of acute DVT on 07/16.  Patient presented to the emergency department on 07/17 after a mechanical fall at home.  Fall was witnessed.  Patient did not syncopized.  Patient's trauma evaluation was negative in the emergency department.  Patient was noted to have profound weakness with attempts to lift himself off of the toilet.  PT/OT recommended short-term rehab.  Today, wife at bedside is declining short-term rehab.  Family would like to be discharged with outpatient physical therapy/Occupational Therapy.  Podiatry had already sent an order for outpatient PT.  I did provide an ambulatory referral for outpatient occupational therapy.  At time of discharge, patient is without complaints.  He denies chest pain, shortness of breath, nausea, vomiting, abdominal pain, dyspnea on exertion, orthopnea, fever, dysuria, or constipation.    Cardiology was consulted during hospital course.  Cardiology did not believe a cardiac source was to blame for fall.  Nephrology was consulted for routine hemodialysis.  Wife at bedside does state patient is appropriately set up with dialysis as an outpatient.    Cardiology was also consulted for an elevated troponin.  No further cardiac testing was warranted.  Elevated troponin was believed to be secondary to hypervolemia in setting of CHF/end-stage renal disease.  Volume management occurred via hemodialysis.    Please see above list of diagnoses and related plan for additional information.     Condition at Discharge: fair    Discharge Day Visit / Exam:   Subjective: Patient notes he has no complaints.  He is eager to be discharged home.  He cannot explain any overnight events that were bothersome.  He is tolerating p.o. intake.  Vitals: Blood Pressure: 118/57 (07/21/24 0702)  Pulse: 74 (07/21/24 0702)  Temperature: 97.9 °F (36.6 °C) (07/21/24 0702)  Temp Source: Axillary (07/19/24  "1215)  Respirations: 17 (07/21/24 0702)  Height: 5' 8\" (172.7 cm) (07/18/24 1414)  Weight - Scale: 103 kg (227 lb) (07/18/24 1414)  SpO2: 100 % (07/21/24 0702)  Exam:   Physical Exam  Constitutional:       Appearance: He is normal weight.      Comments: Chronically ill in appearance   HENT:      Head: Normocephalic.      Right Ear: External ear normal.      Left Ear: External ear normal.      Nose: Nose normal.      Mouth/Throat:      Mouth: Mucous membranes are moist.      Pharynx: Oropharynx is clear.   Eyes:      Extraocular Movements: Extraocular movements intact.      Conjunctiva/sclera: Conjunctivae normal.   Cardiovascular:      Rate and Rhythm: Normal rate and regular rhythm.      Pulses: Normal pulses.      Comments: AV fistula in left upper extremity with audible bruit  Pulmonary:      Effort: Pulmonary effort is normal.   Abdominal:      General: Abdomen is flat. Bowel sounds are normal.      Palpations: Abdomen is soft.   Musculoskeletal:      Cervical back: Normal range of motion.   Skin:     General: Skin is warm and dry.   Neurological:      General: No focal deficit present.      Mental Status: He is alert. Mental status is at baseline.   Psychiatric:         Mood and Affect: Mood normal.         Behavior: Behavior normal.          Discussion with Family: Updated  (wife) at bedside.    Discharge instructions/Information to patient and family:   See after visit summary for information provided to patient and family.      Provisions for Follow-Up Care:  See after visit summary for information related to follow-up care and any pertinent home health orders.      Mobility at time of Discharge:   Basic Mobility Inpatient Raw Score: 16  JH-HLM Goal: 5: Stand one or more mins  JH-HLM Achieved: 5: Stand (1 or more minutes)  HLM Goal NOT achieved. Continue to encourage mobility in post discharge setting.     Disposition:   Home with outpatient PT/OT ambulatory referrals provided    Planned " Readmission: None     Discharge Statement:  I spent 65 minutes discharging the patient. This time was spent on the day of discharge. I had direct contact with the patient on the day of discharge. Greater than 50% of the total time was spent examining patient, answering all patient questions, arranging and discussing plan of care with patient as well as directly providing post-discharge instructions.  Additional time then spent on discharge activities.    Discharge Medications:  See after visit summary for reconciled discharge medications provided to patient and/or family.      **Please Note: This note may have been constructed using a voice recognition system**

## 2024-07-21 NOTE — ASSESSMENT & PLAN NOTE
Known to outpatient podiatry  S/p right toe amputation  S/p arteriogram earlier this year with a multitude of LLE vascular intervention  Continue Effient/statin; outpt f/u

## 2024-07-21 NOTE — ASSESSMENT & PLAN NOTE
Lab Results   Component Value Date    HGBA1C 5.9 (H) 07/14/2024     Diet controlled at home  SSI used during hospital course

## 2024-07-21 NOTE — PLAN OF CARE
Problem: CARDIOVASCULAR - ADULT  Goal: Maintains optimal cardiac output and hemodynamic stability  Description: INTERVENTIONS:  - Monitor I/O, vital signs and rhythm  - Monitor for S/S and trends of decreased cardiac output  - Administer and titrate ordered vasoactive medications to optimize hemodynamic stability  - Assess quality of pulses, skin color and temperature  - Assess for signs of decreased coronary artery perfusion  - Instruct patient to report change in severity of symptoms  Outcome: Progressing  Goal: Absence of cardiac dysrhythmias or at baseline rhythm  Description: INTERVENTIONS:  - Continuous cardiac monitoring, vital signs, obtain 12 lead EKG if ordered  - Administer antiarrhythmic and heart rate control medications as ordered  - Monitor electrolytes and administer replacement therapy as ordered  Outcome: Progressing     Problem: MUSCULOSKELETAL - ADULT  Goal: Maintain or return mobility to safest level of function  Description: INTERVENTIONS:  - Assess patient's ability to carry out ADLs; assess patient's baseline for ADL function and identify physical deficits which impact ability to perform ADLs (bathing, care of mouth/teeth, toileting, grooming, dressing, etc.)  - Assess/evaluate cause of self-care deficits   - Assess range of motion  - Assess patient's mobility  - Assess patient's need for assistive devices and provide as appropriate  - Encourage maximum independence but intervene and supervise when necessary  - Involve family in performance of ADLs  - Assess for home care needs following discharge   - Consider OT consult to assist with ADL evaluation and planning for discharge  - Provide patient education as appropriate  Outcome: Progressing  Goal: Maintain proper alignment of affected body part  Description: INTERVENTIONS:  - Support, maintain and protect limb and body alignment  - Provide patient/ family with appropriate education  Outcome: Progressing     Problem: SAFETY ADULT  Goal:  Patient will remain free of falls  Description: INTERVENTIONS:  - Educate patient/family on patient safety including physical limitations  - Instruct patient to call for assistance with activity   - Consult OT/PT to assist with strengthening/mobility   - Keep Call bell within reach  - Keep bed low and locked with side rails adjusted as appropriate  - Keep care items and personal belongings within reach  - Initiate and maintain comfort rounds  - Make Fall Risk Sign visible to staff  - Offer Toileting every 2 Hours, in advance of need  - Initiate/Maintain bed alarm  - Obtain necessary fall risk management equipment  - Apply yellow socks and bracelet for high fall risk patients  - Consider moving patient to room near nurses station  Outcome: Progressing  Goal: Maintain or return to baseline ADL function  Description: INTERVENTIONS:  -  Assess patient's ability to carry out ADLs; assess patient's baseline for ADL function and identify physical deficits which impact ability to perform ADLs (bathing, care of mouth/teeth, toileting, grooming, dressing, etc.)  - Assess/evaluate cause of self-care deficits   - Assess range of motion  - Assess patient's mobility; develop plan if impaired  - Assess patient's need for assistive devices and provide as appropriate  - Encourage maximum independence but intervene and supervise when necessary  - Involve family in performance of ADLs  - Assess for home care needs following discharge   - Consider OT consult to assist with ADL evaluation and planning for discharge  - Provide patient education as appropriate  Outcome: Progressing  Goal: Maintains/Returns to pre admission functional level  Description: INTERVENTIONS:  - Perform AM-PAC 6 Click Basic Mobility/ Daily Activity assessment daily.  - Set and communicate daily mobility goal to care team and patient/family/caregiver.   - Collaborate with rehabilitation services on mobility goals if consulted  - Perform Range of Motion 4 times a  day.  - Reposition patient every 2 hours.  - Dangle patient 3 times a day  - Stand patient 3 times a day  - Ambulate patient 3 times a day  - Out of bed to chair 3 times a day   - Out of bed for meals 3 times a day  - Out of bed for toileting  - Record patient progress and toleration of activity level   Outcome: Progressing     Problem: Prexisting or High Potential for Compromised Skin Integrity  Goal: Skin integrity is maintained or improved  Description: INTERVENTIONS:  - Identify patients at risk for skin breakdown  - Assess and monitor skin integrity  - Assess and monitor nutrition and hydration status  - Monitor labs   - Assess for incontinence   - Turn and reposition patient  - Assist with mobility/ambulation  - Relieve pressure over bony prominences  - Avoid friction and shearing  - Provide appropriate hygiene as needed including keeping skin clean and dry  - Evaluate need for skin moisturizer/barrier cream  - Collaborate with interdisciplinary team   - Patient/family teaching  - Consider wound care consult   Outcome: Progressing     Problem: METABOLIC, FLUID AND ELECTROLYTES - ADULT  Goal: Electrolytes maintained within normal limits  Description: INTERVENTIONS:  - Monitor labs and assess patient for signs and symptoms of electrolyte imbalances  - Administer electrolyte replacement as ordered  - Monitor response to electrolyte replacements, including repeat lab results as appropriate  - Instruct patient on fluid and nutrition as appropriate  Outcome: Progressing  Goal: Fluid balance maintained  Description: INTERVENTIONS:  - Monitor labs   - Monitor I/O and WT  - Instruct patient on fluid and nutrition as appropriate  - Assess for signs & symptoms of volume excess or deficit  Outcome: Progressing

## 2024-07-21 NOTE — ASSESSMENT & PLAN NOTE
Last EF of 40% in 12/2023 w/ apical akinesis and multi-regional LV hypokinesis, mild-moderate AR, moderate -severe AS, moderate MR, and mild TR  Repeat ECHO: w/ improved EF of 45%   Fluid management via dialysis  Continue Toprol-XL & Entresto  Sodium/fluid restriction

## 2024-07-21 NOTE — PLAN OF CARE

## 2024-07-21 NOTE — CASE MANAGEMENT
Case Management Discharge Planning Note    Patient name Asad Galloway  Location S /S -01 MRN [id number]  : 1960 Date 2024       Current Admission Date: 2024  Current Admission Diagnosis:Fall   Patient Active Problem List    Diagnosis Date Noted Date Diagnosed    Acute deep vein thrombosis (DVT) of right lower extremity ([facility]) 2024     Bicytopenia 2024     Obesity (BMI 30.0-34.9) 2024     QT prolongation 2024     Acute deep vein thrombosis (DVT) of right peroneal vein ([facility]) 2024     Acute cough 2024     Right ankle pain 2024     CHF (congestive heart failure) ([facility]) 2024     PAD (peripheral artery disease) ([facility]) 2024     Type 2 diabetes mellitus with diabetic neuropathy, unspecified whether long term insulin use ([facility]) 2024     Ulcer of left heel, limited to breakdown of skin ([facility]) 2024     Hypertensive heart and chronic kidney disease with heart failure and with stage 5 chronic kidney disease, or end stage renal disease ([facility]) 2024     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 ([facility]) 2024     Chronic systolic heart failure ([facility]) 2024     Hypotension 2023     Edema of left lower extremity 2023     Rectal bleeding 2023     Nonhealing ulcer of heel ([facility]) 2023     Elevated troponin 2023     Presence of external cardiac defibrillator 2023     Diabetic polyneuropathy associated with type 2 diabetes mellitus ([facility]) 2023     Absence of toe of right foot ([facility]) 2023     Hammer toes of both feet 2023     Ischemic cardiomyopathy 2023     Moderate AS (aortic stenosis) 2023     Mood disorder ([facility]) 2023     Old myocardial infarction 2023     Hyponatremia 2023     Fall 2023     SOB (shortness of breath) 10/21/2022     Left arm swelling 10/21/2022     CAD, multiple vessel 2022      Electrolyte abnormality 05/02/2022     Chest pain 05/01/2022     Generalized weakness 04/19/2022     Primary hypertension 04/06/2022     Penetrating foot wound, left, initial encounter 01/19/2022     Emotional lability 01/19/2022     History of 2019 novel coronavirus disease (COVID-19) 12/26/2020     ESRD on dialysis (HCC) 12/26/2020     Anemia 10/05/2020     S/P arteriovenous (AV) fistula creation 04/27/2020     Pre-kidney transplant, listed 04/27/2020     Urge incontinence 12/20/2019     Anxiety associated with depression 02/28/2019     History of stroke in adulthood 10/04/2018     Abnormal EEG 09/24/2018     Other constipation 08/17/2018     GERD (gastroesophageal reflux disease) 08/13/2018     Elevated alkaline phosphatase level 08/03/2018     Nephrotic range proteinuria 03/28/2018     Type 2 diabetes mellitus (HCC) 02/26/2016     Dizziness 02/26/2016     Hyperlipidemia 02/26/2016     Antiplatelet or antithrombotic long-term use 02/26/2016       LOS (days): 3  Geometric Mean LOS (GMLOS) (days): 3.9  Days to GMLOS:1.2     OBJECTIVE:  Risk of Unplanned Readmission Score: 30.62         Current admission status: Inpatient   Preferred Pharmacy:   [facility]/pharm[facility] #[id number] - [place] - [address]  [address] [place] [address]  Phone: [phone] Fax: [phone]    Primary Care Provider: Raj Auguste DO    Primary Insurance: MEDICARE  Secondary Insurance: [facility]    DISCHARGE DETAILS:    Discharge planning discussed with:: Sam-son     Comments - Freedom of Choice: MIKE was in contact with Sam to follow up on the Pt and family decision regarding STR with the Pt's readiness for d/c. Sam reported he was in contact with [facility] and was made aware the Pt is not appropriate for the acute level of rehab with the suggestion of the SNF level. Sam reported at this time the Pt and family would prefer the Pt be d/c to home with outpatient therapy, if possible five days a week.        Contacts  Patient Contacts: Sam  Relationship to Patient:: Family  Contact Method: Phone  Phone Number: [phone] (M)  Reason/Outcome: Discharge Planning    Requested Home Health Care         Is the patient interested in HHC at discharge?: No    DME Referral Provided  Referral made for DME?: No    Other Referral/Resources/Interventions Provided:  Interventions: Outpatient PT, Outpatient OT  Referral Comments: Pt and family now refusing STR and is requesting a d/c to home with outpatient therapy services which the family will transport him back and forth to.         Treatment Team Recommendation: Short Term Rehab  Discharge Destination Plan:: Home  Transport at Discharge : Family

## 2024-07-21 NOTE — QUICK NOTE
Chart reviewed.  Vitals reviewed.  Patient pending placement to rehab.  Last hemodialysis session was 07/19.  Next hemodialysis session is planned for tomorrow if still in-house.  Please call with questions in interim.

## 2024-07-21 NOTE — ASSESSMENT & PLAN NOTE
Presenting troponin of 421 (was 97 two weeks ago)  Suspect non-MI troponin elevation in the setting of fall with underlying CHF and ESRD on HD    Troponin flat 421->414->300  No acute cardiac interventions  At d/c, CP free; no atypical CP symptoms

## 2024-07-21 NOTE — ASSESSMENT & PLAN NOTE
Chronic anemia/thrombocytopenia noted  Monitor CBC    Recent Labs     07/19/24  0505   WBC 4.95   *

## 2024-07-21 NOTE — ASSESSMENT & PLAN NOTE
QT of 486 on EKG (QTc of 516)  Limit/avoid QT prolonging agents as possible  Chronically elevated looking back for more than a year  Outpt monitoring

## 2024-07-21 NOTE — ASSESSMENT & PLAN NOTE
After returning home from being discharged on 07/16 from [facility] pt sustained a mechanical fall on 07/17 while walking up steps w/ subsequent fall backwards and reported head strike. Fall witnessed by family; no syncope. Presented to the ED after inability to lift himself off a toilet seat with weakness.   CT head/ c-spine: unremarkable for acute injury/abnormalities; remainder of traumatic radiology also unremarkable for acute injuries  Orthostatics negative  TSH WNL  Echocardiogram w/ improved EF 45%  Seen by Cardiology; cardiac source not suspected  PT recommend STR; family declined; ambulatory referrals given for outpt PT/OT

## 2024-07-22 ENCOUNTER — TRANSITIONAL CARE MANAGEMENT (OUTPATIENT)
Dept: INTERNAL MEDICINE CLINIC | Facility: CLINIC | Age: 64
End: 2024-07-22

## 2024-07-23 ENCOUNTER — OFFICE VISIT (OUTPATIENT)
Dept: INTERNAL MEDICINE CLINIC | Facility: CLINIC | Age: 64
End: 2024-07-23
Payer: MEDICARE

## 2024-07-23 VITALS
WEIGHT: 208 LBS | DIASTOLIC BLOOD PRESSURE: 84 MMHG | SYSTOLIC BLOOD PRESSURE: 122 MMHG | TEMPERATURE: 98 F | BODY MASS INDEX: 31.63 KG/M2 | OXYGEN SATURATION: 97 % | HEART RATE: 86 BPM

## 2024-07-23 DIAGNOSIS — N18.6 ESRD ON DIALYSIS (HCC): ICD-10-CM

## 2024-07-23 DIAGNOSIS — I50.22 CHRONIC SYSTOLIC HEART FAILURE (HCC): ICD-10-CM

## 2024-07-23 DIAGNOSIS — I25.10 CAD, MULTIPLE VESSEL: ICD-10-CM

## 2024-07-23 DIAGNOSIS — R73.03 PRE-DIABETES: ICD-10-CM

## 2024-07-23 DIAGNOSIS — Z09 HOSPITAL DISCHARGE FOLLOW-UP: Primary | ICD-10-CM

## 2024-07-23 DIAGNOSIS — I73.9 PAD (PERIPHERAL ARTERY DISEASE) (HCC): ICD-10-CM

## 2024-07-23 DIAGNOSIS — I13.2 HYPERTENSIVE HEART AND CHRONIC KIDNEY DISEASE WITH HEART FAILURE AND WITH STAGE 5 CHRONIC KIDNEY DISEASE, OR END STAGE RENAL DISEASE (HCC): ICD-10-CM

## 2024-07-23 DIAGNOSIS — Z99.2 ESRD ON DIALYSIS (HCC): ICD-10-CM

## 2024-07-23 DIAGNOSIS — E78.2 MIXED HYPERLIPIDEMIA: ICD-10-CM

## 2024-07-23 DIAGNOSIS — I25.5 ISCHEMIC CARDIOMYOPATHY: ICD-10-CM

## 2024-07-23 DIAGNOSIS — I82.401 ACUTE DEEP VEIN THROMBOSIS (DVT) OF RIGHT LOWER EXTREMITY, UNSPECIFIED VEIN (HCC): ICD-10-CM

## 2024-07-23 DIAGNOSIS — F39 MOOD DISORDER (HCC): Chronic | ICD-10-CM

## 2024-07-23 PROBLEM — I95.9 HYPOTENSION: Status: RESOLVED | Noted: 2023-12-26 | Resolved: 2024-07-23

## 2024-07-23 PROBLEM — I10 PRIMARY HYPERTENSION: Status: RESOLVED | Noted: 2022-04-06 | Resolved: 2024-07-23

## 2024-07-23 PROBLEM — I50.9 CHF (CONGESTIVE HEART FAILURE) (HCC): Status: RESOLVED | Noted: 2024-07-05 | Resolved: 2024-07-23

## 2024-07-23 PROCEDURE — 99495 TRANSJ CARE MGMT MOD F2F 14D: CPT | Performed by: INTERNAL MEDICINE

## 2024-07-23 RX ORDER — DIVALPROEX SODIUM 125 MG/1
250 CAPSULE, COATED PELLETS ORAL EVERY 12 HOURS SCHEDULED
Qty: 360 CAPSULE | Refills: 1 | Status: SHIPPED | OUTPATIENT
Start: 2024-07-23

## 2024-07-23 NOTE — PROGRESS NOTES
Transition of Care Visit  Name: Asad Galloway      : 1960      MRN: [id number]  Encounter Provider: Raj Auguste DO  Encounter Date: 2024   Encounter department: [facility] INTERNAL MEDICINE    Assessment & Plan   1. Hospital discharge follow-up  2. Ischemic cardiomyopathy  Comments:  stable, c/w ARNI, statin, BB  3. PAD (peripheral artery disease) (HCC)  Comments:  stable, c/w statin  4. Hypertensive heart and chronic kidney disease with heart failure and with stage 5 chronic kidney disease, or end stage renal disease (HCC)  Comments:  stable, c/w BB/ARNi and management by nephrology/cardiology  5. CAD, multiple vessel  Comments:  s/p stenting.  c/w statin/ARNI/BB  6. Chronic systolic heart failure (HCC)  Comments:  stable, c/w BB/ARNI/statin  7. Acute deep vein thrombosis (DVT) of right lower extremity, unspecified vein ([facility])  Comments:  taking eliquis and seeing hematology at [facility] for follow  8. Pre-diabetes  Comments:  doing well, with A1C of 5.9%, c/w diet control  9. ESRD on dialysis ([facility])  Comments:  goes M/W/F, c/w care per nephrology  10. Mood disorder ([facility])  Comments:  taking depakote and stable, c/w rx  Orders:  -     divalproex sodium (DEPAKOTE SPRINKLE) 125 MG capsule; Take 2 capsules (250 mg total) by mouth every 12 (twelve) hours  -     Valproic acid level, total; Future  11. Mixed hyperlipidemia  Comments:  taking statin and no SE, c/w rx      Depression Screening and Follow-up Plan: Patient was screened for depression during today's encounter. They screened negative with a PHQ-9 score of 0.        History of Present Illness     Transitional Care Management Review:   Asad Galloway is a 64 y.o. male here for TCM follow up.     During the TCM phone call patient stated:  TCM Call       Date and time call was made  2024  8:16 AM    Hospital care reviewed  Records reviewed    Patient was hospitialized at  [facility]    Date of Admission  24    Date of  discharge  07/21/24    Diagnosis  fall    Disposition  Home    Were the patients medications reviewed and updated  No    Current Symptoms  None          TCM Call       Post hospital issues  None    Should patient be enrolled in anticoag monitoring?  No    Scheduled for follow up?  Yes    Patient refusal reason  DID NOT RETURN ANY OF MY PHONE CALLS TO SCHEDULE HIS APPT    Did you obtain your prescribed medications  Yes    Do you need help managing your prescriptions or medications  No    Is transportation to your appointment needed  No    I have advised the patient to call PCP with any new or worsening symptoms  Siena Howard MA    Living Arrangements  Family members    Support System  None    The type of support provided  Emotional; Financial; Physical    Are you recieving any outpatient services  No    Are you recieving home care services  No    Are you using any community resources  No    Current waiver services  No    Have you fallen in the last 12 months  No    Interperter language line needed  No    Counseling  Family    Counseling topics  Importance of RX compliance    Comments  Left message for call back          HPI    Here for hospital discharge follow up, Todd is overall stable.  He is here with his wife during today's appt.  He was in the hospital twice this month for acute DVT and a fall.  He had work up in hospital and eval and discharged to home when stable.  He is taking eliquis for DVT and has not had bleeding issues.  His right leg swelling has improved.  He goes to dialysis M/W/F and is on kidney transplant list now at [facility].  His foot ulcer healed.  His wife provides majority of history.  His A1C is doing well at 5.9%  he is stressed as he wants to get kidney transplant and has been in process for this for sometime.  He has a new grandson and a grand-daughter on the way.  ROS otherwise negative, no other complaints.    Review of Systems   Constitutional:  Negative for fever.   HENT:  Negative for  congestion.    Eyes:  Negative for visual disturbance.   Respiratory:  Negative for shortness of breath.    Cardiovascular:  Positive for leg swelling (right leg but improving). Negative for chest pain.   Gastrointestinal:  Negative for abdominal pain.   Endocrine: Negative for polyuria.   Genitourinary:  Negative for frequency (on dialysis).   Musculoskeletal:  Positive for gait problem (using walker).   Skin:  Negative for rash.   Allergic/Immunologic: Negative for immunocompromised state.   Neurological:  Negative for dizziness.   Hematological:  Does not bruise/bleed easily.   Psychiatric/Behavioral:  Negative for dysphoric mood. The patient is nervous/anxious.      Objective     /84 (BP Location: Left arm, Patient Position: Sitting, Cuff Size: Standard)   Pulse 86   Temp 98 °F (36.7 °C)   Wt 94.3 kg (208 lb) Comment: weight at dialysis yesterday  SpO2 97%   BMI 31.63 kg/m²     Physical Exam  Vitals reviewed.   Constitutional:       General: He is not in acute distress.     Appearance: Normal appearance.   HENT:      Head: Normocephalic and atraumatic.      Right Ear: Tympanic membrane normal.      Left Ear: Tympanic membrane normal.      Mouth/Throat:      Pharynx: Oropharynx is clear.   Eyes:      Conjunctiva/sclera: Conjunctivae normal.   Cardiovascular:      Rate and Rhythm: Normal rate and regular rhythm.   Pulmonary:      Effort: Pulmonary effort is normal.      Breath sounds: No wheezing or rales.   Abdominal:      General: Abdomen is flat. Bowel sounds are normal.      Palpations: Abdomen is soft.      Tenderness: There is no abdominal tenderness.   Musculoskeletal:      Cervical back: Neck supple.      Right lower leg: No edema.      Left lower leg: No edema.   Neurological:      Mental Status: He is alert.   Psychiatric:         Mood and Affect: Mood normal. Mood is not depressed. Affect is tearful (when discussing possible kidney transplant).         Behavior: Behavior normal.       Lab  Results   Component Value Date    HGBA1C 5.9 (H) 07/14/2024       Medications have been reviewed by provider in current encounter    Administrative Statements

## 2024-07-25 ENCOUNTER — EVALUATION (OUTPATIENT)
Dept: PHYSICAL THERAPY | Facility: CLINIC | Age: 64
End: 2024-07-25
Payer: MEDICARE

## 2024-07-25 DIAGNOSIS — E11.42 DIABETIC POLYNEUROPATHY ASSOCIATED WITH TYPE 2 DIABETES MELLITUS (HCC): ICD-10-CM

## 2024-07-25 DIAGNOSIS — R53.81 PHYSICAL DECONDITIONING: Primary | ICD-10-CM

## 2024-07-25 DIAGNOSIS — R26.89 POOR BALANCE: ICD-10-CM

## 2024-07-25 PROCEDURE — 97163 PT EVAL HIGH COMPLEX 45 MIN: CPT

## 2024-07-25 PROCEDURE — 97530 THERAPEUTIC ACTIVITIES: CPT

## 2024-07-25 NOTE — PROGRESS NOTES
PT Evaluation     Today's date: 2024  Patient name: Asad Galloway  : 1960  MRN: [id number]  Referring provider: Franklin Church DPM  Dx:   Encounter Diagnosis     ICD-10-CM    1. Physical deconditioning  R53.81       2. Diabetic polyneuropathy associated with type 2 diabetes mellitus (HCC)  E11.42 Ambulatory referral to Physical Therapy      3. Poor balance  R26.89 Ambulatory referral to Physical Therapy          Start Time: 08  Stop Time: 930  Total time in clinic (min): 45 minutes    Assessment  Impairments: abnormal coordination, abnormal gait, abnormal muscle firing, abnormal movement, activity intolerance, impaired balance, impaired physical strength, lacks appropriate home exercise program and safety issue    Assessment details: Asad Galloway is a pleasant 64 y.o. male presents with signs and symptoms consistent with:   Diabetic polyneuropathy associated with type 2 diabetes mellitus (HCC)  Poor balance  Deconditioning      he has decreased strength, decreased function, and is at risk for falls and has decreased safety awareness resulting in worry over not knowing what's wrong and fear of not being able to keep active. These impairments listed above are preventing the patient from participating in functional activity. At this time patient will benefit from No further referral appears necessary at this time based upon examination results, and is negative for any red flags. Prognosis is fair given HEP compliance and attendance to physical therapy 2x a week.  Positive prognostic indicators include positive attitude toward recovery and good understanding of diagnosis and treatment plan options.  Negative prognostic indicators include chronicity of symptoms, anxiety, hypertension, multiple concurrent orthopedic problems, obesity, and extensive medical history.  Patent will benefit from skilled physical therapy at this time to address deficits to improve overall function and return to PLOF. Patient  verbalized understanding of POC, HEP, and return demonstrated HEP. All questions were answered to patients satisfaction.     Please contact me if you have any questions or recommendations. Thank you for the referral and the opportunity to share in Asad Galloway's care.      Understanding of Dx/Px/POC: good     Prognosis: good    Goals  Impairment Goals 4-6 weeks  In order to improve and maximize function patient will be able to...  - Improve overall strength in both LE and UE to 4/5  - Demonstrate McTSIB within age related norms  - Demonstrate at least 60 on ABC scale        Functional Goals 6-8 weeks  In order to improve and maximize function patient will be able to...  - Return to Prior Level of Function with no greater than 2/10 pain   - Increase Functional Status Measure (FOTO) to: >predicted outcomes  - Be independent and compliant with HEP   - Participate in ADL/IADLS without feeling unsteady or LOB  - Decrease TUG and 5xSTS to below 13 seconds to demonstrate less risk of falls by DC  - Report no instances of falls since beginning PT  - Ambulate on even and uneven surfaces safely without an AD.         Plan  Patient would benefit from: skilled PT    Planned therapy interventions: neuromuscular re-education, patient education, strengthening, stretching, therapeutic activities, therapeutic exercise, home exercise program, functional ROM exercises, postural training, gait training and transfer training    Frequency: 2x week (2-3x week)  Duration in weeks: 8  Treatment plan discussed with: patient        Subjective Evaluation    History of Present Illness  Mechanism of injury: Patient presents to PT today with decondition. His son reports that he had an ulcer in his left foot a year ago which is closed, but was in a boot for a year.  He has a blood clot in his R leg which he is on eloquis for. His son reports that the Mds need him to be more active. He presents walking with a walker. He reports walking is  challenging, and getting out of a chair is challenging. Currently on kidney transplant list, and does dialysis MWF. He lives with his wife and his son lives next door. Fall about 3 weeks ag.     Difficulty with: getting in and out of chair, walking, stairs.  Quality of life: fair    Patient Goals  Patient goals for therapy: decreased pain, independence with ADLs/IADLs and return to sport/leisure activities  Patient goal: gain independence back,  Pain  No pain reported    Social Support  Steps to enter house: yes (can go in through the garage)  Stairs in house: yes   Lives in: multiple-level home  Lives with: spouse and adult children    Employment status: not working    Diagnostic Tests  Ultrasound findings: abnormal (blood clot in left leg)Treatments  Discharged from (in last 30 days) comments: hospitalized for about a week (treating for clot).       Objective     Strength/Myotome Testing     Left Hip   Planes of Motion   Flexion: 3+  Extension: 3+  Abduction: 3+  Adduction: 3+  External rotation: 3+    Isolated Muscles   Gluteus linette: 3  Gluteus medius: 3    Right Hip   Planes of Motion   Flexion: 3+  Extension: 3+  Abduction: 3+  Adduction: 3+  External rotation: 3+    Isolated Muscles   Gluteus maximums: 3  Gluteus medius: 3    Additional Strength Details  Ankle DF: 2+/5 weston  Ankle PF: 2+/5    General Comments:      Hip Comments   TU:05 (needed mod assist with STS.) Extensive cueing for safety to push up from the chair. Attempts STS with the walker.   - Gait: Shuffle gait pattern with decreased toe off, and decrease stride length   5xSTS: did not attempt due to safety                POC Expires Auth Status Start Date Expiration Date PT Visit Limit      BOMN   Date        Used 1       Remaining           Diagnosis:  Weakness, deconditioning   Precautions:  Dialysis (on kidney transplant), Hx of stroke, DM, current blood clot in R leg   Comparable signs 1) Walking     Primary Impairments: 1)  Generalized weakness  2) Impaired proprioception   Patient Goals Improve independent   Manual Therapy 7/25                                            Re-evaluation          Exercise Diary          Therapeutic Exercise         Bike/Nu Step         Hip 3 way         Leg press         Seated HR         LAQ         Seated clams                  Neuromuscular Re-education         EO/EC          EO/EC foam         Wobble board                                                      Therapeutic Activities         Education  POC, diagnosis, expecations        STS                                    Modalities

## 2024-07-30 ENCOUNTER — OFFICE VISIT (OUTPATIENT)
Dept: PHYSICAL THERAPY | Facility: CLINIC | Age: 64
End: 2024-07-30
Payer: MEDICARE

## 2024-07-30 ENCOUNTER — HOSPITAL ENCOUNTER (OUTPATIENT)
Dept: VASCULAR ULTRASOUND | Facility: HOSPITAL | Age: 64
Discharge: HOME | End: 2024-07-30
Payer: MEDICARE

## 2024-07-30 DIAGNOSIS — R26.89 POOR BALANCE: ICD-10-CM

## 2024-07-30 DIAGNOSIS — E11.42 DIABETIC POLYNEUROPATHY ASSOCIATED WITH TYPE 2 DIABETES MELLITUS (HCC): Primary | ICD-10-CM

## 2024-07-30 DIAGNOSIS — R53.81 PHYSICAL DECONDITIONING: ICD-10-CM

## 2024-07-30 DIAGNOSIS — I70.244 ATHEROSCLEROSIS OF NATIVE ARTERY OF LEFT LOWER EXTREMITY WITH ULCERATION OF HEEL (HCC): Chronic | ICD-10-CM

## 2024-07-30 PROCEDURE — 93922 UPR/L XTREMITY ART 2 LEVELS: CPT | Performed by: SURGERY

## 2024-07-30 PROCEDURE — 97112 NEUROMUSCULAR REEDUCATION: CPT

## 2024-07-30 PROCEDURE — 93926 LOWER EXTREMITY STUDY: CPT | Performed by: SURGERY

## 2024-07-30 PROCEDURE — 93926 LOWER EXTREMITY STUDY: CPT

## 2024-07-30 NOTE — PROGRESS NOTES
"Daily Note     Today's date: 2024  Patient name: Asad Galloway  : 1960  MRN: [id number]  Referring provider: Franklin Church DPM  Dx:   Encounter Diagnosis     ICD-10-CM    1. Diabetic polyneuropathy associated with type 2 diabetes mellitus (HCC)  E11.42       2. Poor balance  R26.89       3. Physical deconditioning  R53.81           Subjective: No complaints upon arrival.      Objective: See treatment diary below      Assessment: Challenged with STS, required addition of 2 foam to complete without UE support. Cues for eccentric control. Patient unable to complete hip kicks on LLE due to complaints of pain. Cues overall for slow and controlled movements as well as prescribed hold times. Cues and education for proper form and technique throughout. Pt demo decreased endurance, noted fatigue, with pt reports of \"being tired\". Decreased ability to complete exercises due to this and pt reported weakness. Requires external motivation and encouragement to complete exercises. Some limitations in reps/sets performed as a result. Deferred any additional standing exercises.      Plan: Continue per plan of care.  Progress treatment as tolerated.         POC Expires Auth Status Start Date Expiration Date PT Visit Limit      BOMN   Date       Used 1 2      Remaining           Diagnosis:  Weakness, deconditioning   Precautions:  Dialysis (on kidney transplant), Hx of stroke, DM, current blood clot in R leg   Comparable signs 1) Walking     Primary Impairments: 1) Generalized weakness  2) Impaired proprioception   Patient Goals Improve independent   Manual Therapy                                            Re-evaluation          Exercise Diary          Therapeutic Exercise         Bike/Nu Step  5 min       Hip 3 way  RLE kicking only 10x ea       Leg press         Seated HR/TR  20x       marches  Seated 2x10 B       Hamstring curls  Seated, blue TB 2x10 ea       LAQ  10x B       Seated clams  Grn " TB 2x10       Seated hip add  2x10       Neuromuscular Re-education         EO/EC   Foam: FA/EC 30''X2       EO/EC foam         Wobble board                                                      Therapeutic Activities         Education  POC, diagnosis, expecations        STS  2 foam on chair, 2x10 no UE                                  Modalities

## 2024-08-01 ENCOUNTER — TELEPHONE (OUTPATIENT)
Dept: VASCULAR SURGERY | Facility: CLINIC | Age: 64
End: 2024-08-01

## 2024-08-01 ENCOUNTER — OFFICE VISIT (OUTPATIENT)
Dept: PHYSICAL THERAPY | Facility: CLINIC | Age: 64
End: 2024-08-01
Payer: MEDICARE

## 2024-08-01 DIAGNOSIS — E11.42 DIABETIC POLYNEUROPATHY ASSOCIATED WITH TYPE 2 DIABETES MELLITUS (HCC): Primary | ICD-10-CM

## 2024-08-01 DIAGNOSIS — R53.81 PHYSICAL DECONDITIONING: ICD-10-CM

## 2024-08-01 DIAGNOSIS — R26.89 POOR BALANCE: ICD-10-CM

## 2024-08-01 PROCEDURE — 97530 THERAPEUTIC ACTIVITIES: CPT | Performed by: PHYSICAL THERAPIST

## 2024-08-01 PROCEDURE — 97110 THERAPEUTIC EXERCISES: CPT | Performed by: PHYSICAL THERAPIST

## 2024-08-01 NOTE — TELEPHONE ENCOUNTER
Attempted to contact patient to schedule appointment(s) listed below.  Requested patient call [phone] option 3 to schedule appointment(s).    Patient's appointment(s) are due now.    Dopplers  [] Abdominal Aorta Iliac (AOIL)  [] Carotid (CV)   [] Celiac and/or Mesenteric  [] Endovascular Aortic Repair (EVAR)   [] Hemodialysis Access (HD)   [] Lower Limb Arterial (ALEKSANDER)  [] Lower Limb Venous (LEV)  [] Lower Limb Venous Duplex with Reflux (LEVDR)  [] Renal Artery  [] Upper Limb Arterial (UEA)    [] Upper Limb Venous (UEV)              [] PATSY and Waveform analysis     Advanced Imaging   [] CTA head/neck    [] CTA abdomen    [] CTA abdomen & pelvis    [] CT abdomen with/ without contrast  [] CT abdomen with contrast  [] CT abdomen without contrast    [] CT abdomen & pelvis with/ without contrast  [] CT abdomen & pelvis with contrast  [] CT abdomen & pelvis without contrast    Office Visit   [] New patient, patient last seen over 3 years ago  [] Risk factor modification (RFM)   [x] Follow up   [] Lost to follow up (LTFU)   Called patient & LMOM to schedule 3 MO  v fu w/Mandy/TYSHAWN ARMIJO 7/30/24

## 2024-08-01 NOTE — PROGRESS NOTES
"Daily Note     Today's date: 2024  Patient name: Asad Galloway  : 1960  MRN: [id number]  Referring provider: Franklin Church DPM  Dx:   Encounter Diagnosis     ICD-10-CM    1. Diabetic polyneuropathy associated with type 2 diabetes mellitus (HCC)  E11.42       2. Poor balance  R26.89       3. Physical deconditioning  R53.81           Start Time: 1415  Stop Time: 1445  Total time in clinic (min): 30 minutes    Subjective: Patient reports \"my son had to take me to the hospital today because I wasn't feeling too well, and rushed then me back here. \" He arrives ambulating without assistive device holding onto his son for assistance stating \"we forgot it at home because we were rushing\".      Objective: See treatment diary below      Assessment: Patient arrived 15 min late today. He reports overall fatigue from dialysis. Ambulated with rolling walker in PT this session with CG(A) and cuing for navigation throughout. Cuing on proper and safe technique with sit-stand transfers throughout session. Occasional min(A) required for sit-stand transfers when fatigue/LE weakness reported. Performed exercises today to patient's tolerance, needed motivation from PT throughout session. Tolerated treatment fair. Patient demonstrated fatigue post treatment and would benefit from continued PT      Plan: Continue per plan of care.  Progress treatment as tolerated.         POC Expires Auth Status Start Date Expiration Date PT Visit Limit      BOMN   Date      Used 1 2 3     Remaining           Diagnosis:  Weakness, deconditioning   Precautions:  Dialysis (on kidney transplant), Hx of stroke, DM, current blood clot in R leg   Comparable signs 1) Walking     Primary Impairments: 1) Generalized weakness  2) Impaired proprioception   Patient Goals Improve independent   Manual Therapy                                           Re-evaluation          Exercise Diary          Therapeutic Exercise       "   Bike/Nu Step  5 min NuStep 5min      Hip 3 way  RLE kicking only 10x ea       Leg press         Seated HR/TR  20x Stand unilat x10ea      marches  Seated 2x10 B Standing alt x10ea, seated pink TB 2x5ea      Hamstring curls  Seated, blue TB 2x10 ea       LAQ  10x B Alt x15ea      Seated clams  Grn TB 2x10 Pink TB 2x10      Seated hip add  2x10 2x10      Neuromuscular Re-education         EO/EC   Foam: FA/EC 30''X2       EO/EC foam         Wobble board         Seated Chest press   Purple ball x10                                          Therapeutic Activities         Education  POC, diagnosis, expecations        STS  2 foam on chair, 2x10 no UE T/o standard chair, min(A) especially @ end                                 Modalities

## 2024-08-01 NOTE — TELEPHONE ENCOUNTER
Caller: Asad Galloway    Doctor: SAKSHI    Reason for call: OV scheduled first available, 11/13. Pt asked if there were concerning findings from his ultrasound. Informed the pt I would send an inquiry to the office for a clinical discussion. Please advise.    Call back#: [phone]

## 2024-08-05 NOTE — TELEPHONE ENCOUNTER
Called and s/w pt. Wounds are healed, added pt to the wait list. Asked pt to call back if he developed wounds or rest pain so we can get him in sooner. Pt verbalized understanding.

## 2024-08-06 ENCOUNTER — APPOINTMENT (OUTPATIENT)
Dept: PHYSICAL THERAPY | Facility: CLINIC | Age: 64
End: 2024-08-06
Payer: MEDICARE

## 2024-08-08 ENCOUNTER — OFFICE VISIT (OUTPATIENT)
Dept: PHYSICAL THERAPY | Facility: CLINIC | Age: 64
End: 2024-08-08
Payer: MEDICARE

## 2024-08-08 DIAGNOSIS — R53.81 PHYSICAL DECONDITIONING: ICD-10-CM

## 2024-08-08 DIAGNOSIS — E78.2 MIXED HYPERLIPIDEMIA: ICD-10-CM

## 2024-08-08 DIAGNOSIS — R26.89 POOR BALANCE: ICD-10-CM

## 2024-08-08 DIAGNOSIS — E11.42 DIABETIC POLYNEUROPATHY ASSOCIATED WITH TYPE 2 DIABETES MELLITUS (HCC): Primary | ICD-10-CM

## 2024-08-08 PROCEDURE — 97110 THERAPEUTIC EXERCISES: CPT

## 2024-08-08 PROCEDURE — 97112 NEUROMUSCULAR REEDUCATION: CPT

## 2024-08-08 RX ORDER — ATORVASTATIN CALCIUM 40 MG/1
40 TABLET, FILM COATED ORAL
Qty: 30 TABLET | Refills: 5 | Status: SHIPPED | OUTPATIENT
Start: 2024-08-08

## 2024-08-08 RX ORDER — ATORVASTATIN CALCIUM 40 MG/1
40 TABLET, FILM COATED ORAL
Qty: 30 TABLET | Refills: 0 | Status: SHIPPED | OUTPATIENT
Start: 2024-08-08 | End: 2024-08-08

## 2024-08-08 NOTE — TELEPHONE ENCOUNTER
Patient needs updated blood work. Please place orders. A courtesy refill was provided.   Lipid panel

## 2024-08-08 NOTE — PROGRESS NOTES
Daily Note     Today's date: 2024  Patient name: Asad Galloway  : 1960  MRN: [id number]  Referring provider: Franklin Church DPM  Dx:   Encounter Diagnosis     ICD-10-CM    1. Diabetic polyneuropathy associated with type 2 diabetes mellitus (HCC)  E11.42       2. Poor balance  R26.89       3. Physical deconditioning  R53.81           Subjective: Patient states he feels balance and walking feel better. Ambulates to session with SPC.    Objective: See treatment diary below      Assessment: Continues to be challenge with STS, requiring 1 foam to complete without UE support. Very apprehensive to perform balance training, difficulty completing EC. Noted postural sway and ankle instability, did have a few LOB with proprioceptive training requiring min A to correct. Has reports of dizziness during session which subsided quickly. Overall decreased endurance with noted weakness and fatigue requiring frequent breaks. Continues to require motivation and encouragement to try exercises. Unstaediness and imbalance noted with ambulation with SPC, especially when fatigued at end of session. Advised to use RW going forward.    . Patient demonstrated fatigue post treatment and would benefit from continued PT      Plan: Continue per plan of care.  Progress treatment as tolerated.         POC Expires Auth Status Start Date Expiration Date PT Visit Limit      BOMN   Date      Used 1 2 3     Remaining           Diagnosis:  Weakness, deconditioning   Precautions:  Dialysis (on kidney transplant), Hx of stroke, DM, current blood clot in R leg   Comparable signs 1) Walking     Primary Impairments: 1) Generalized weakness  2) Impaired proprioception   Patient Goals Improve independent   Manual Therapy                                          Re-evaluation          Exercise Diary          Therapeutic Exercise         Bike/Nu Step  5 min NuStep 5min Nustep 5 min     Hip 3 way  RLE kicking only 10x  ea       Leg press         Seated HR/TR  20x Stand unilat x10ea Standing 10x     marches  Seated 2x10 B Standing alt x10ea, seated pink TB 2x5ea Standing 15x     Hamstring curls  Seated, blue TB 2x10 ea       LAQ  10x B Alt x15ea      Seated clams  Grn TB 2x10 Pink TB 2x10      Seated hip add  2x10 2x10      Neuromuscular Re-education         EO/EC   Foam: FA/EC 30''X2  Foam:   FA/EC 30''X2  FTEO 30''x3  Semi tandem EO 30''x2       EO/EC foam         Wobble board    Taps 20x ea     Seated Chest press   Purple ball x10      Tandem walking    3 laps 1 UE support     sidestepping    3 laps B UE support                       Therapeutic Activities         Education  POC, diagnosis, expecations        STS  2 foam on chair, 2x10 no UE T/o standard chair, min(A) especially @ end 1 foam on chair, 2x10                                Modalities

## 2024-08-08 NOTE — TELEPHONE ENCOUNTER
Error below    Lipid panel completed 11/2023 at [facility] lab    Results in chart/care everywhere

## 2024-08-12 ENCOUNTER — APPOINTMENT (OUTPATIENT)
Dept: PHYSICAL THERAPY | Facility: CLINIC | Age: 64
End: 2024-08-12
Payer: MEDICARE

## 2024-08-13 ENCOUNTER — EVALUATION (OUTPATIENT)
Facility: CLINIC | Age: 64
End: 2024-08-13
Payer: MEDICARE

## 2024-08-13 DIAGNOSIS — R41.89 COGNITIVE DEFICITS: ICD-10-CM

## 2024-08-13 DIAGNOSIS — Z86.73 HISTORY OF CVA (CEREBROVASCULAR ACCIDENT): ICD-10-CM

## 2024-08-13 DIAGNOSIS — R42 DIZZINESS: Primary | ICD-10-CM

## 2024-08-13 PROBLEM — R05.1 ACUTE COUGH: Status: RESOLVED | Noted: 2024-07-14 | Resolved: 2024-08-13

## 2024-08-13 PROCEDURE — 97167 OT EVAL HIGH COMPLEX 60 MIN: CPT

## 2024-08-13 NOTE — PROGRESS NOTES
OCCUPATIONAL THERAPY INITIAL EVALUATION    Today's Date: 2024  Patient Name: Asad Galloway  : 1960  MRN: [id number]  Referring Provider: Beatriz Hung PA-C  Dx: Dizziness [R42]      Eval/ Re-eval POC expires Auth #/ Referral # Total units  Start date  Expiration date Extension                                                        Date               Units:  Used               Authed:  Remaining                    SKILLED ANALYSIS:  Pt is a right hand dominant 64 year old male presenting to OP OT s/p recent fall and decrease in functional status. Pt has a history of CVA from 2018. Per the patients wife, the pt has required assistance with ADLs and IADLs since his CVA in 2018, however has had a significant decline since his recent fall resulting in a hospitalization.  Pt currently requires assistance with dressing, bathing, bathroom management, and all IADLs. Pt is demonstrating deficits in the following domains: EF skills, attention, recall,  skills, standing balance, functional activity tolerance, UE strength, hand strength.  Deficits are noted based on the following assessments: MoCA, ROM, dynamometer, pinch gauge, and clinical observation. These deficits are impairing the patient ability to perform ADLs and IADLs to a level he was at before his most recent fall. Recommend OP OT 2-3x/wk for 8-12 weeks with focus on aforementioned deficits to maximize functional recovery, improve QOL, and reduce caregiver burden.  Findings and recommendations discussed with pt, and they are in agreement.    Educated pt on charges of insurance, acknowledge understanding, and are in agreement.    PLEASE COMPLETE TM PART A, 9-HOLE, FDT, MMT NEXT SESSION    PLAN OF CARE START: 24  PLAN OF CARE END: 24  FREQUENCY: 2-3x/week  PRECAUTIONS dialysis 3x/week.     Subjective    Occupational Profile  Pt lives with at home with his wife and two kids (23 and 28), other two kids are  and live on their own. Pt  "lives in a bi-level home. There are 5 steps down and 7 steps up. Pt resides downstairs. They have a shower bench, grab bars, handles on toilet, and railing to assist with getting into and out of bed. Pt is a retired , he has been retired since his stroke. Pt is not driving. Pt wife reports she provides ~80% assistance for all ADLs. Pt wife reports she does all IADLs. Pt was not independent before his recent fall, however he is significantly more dependent at this point.     PATIENT GOAL: \"To get better, to get stronger\"    HISTORY OF PRESENT ILLNESS:   Asad Galloway is a 64 y.o. male patient who originally presented to the hospital on 7/17/2024 due to ambulatory dysfunction after fall.  Patient was admitted to [facility] was discharged with a diagnosis of acute DVT on 07/16.  Patient presented to the emergency department on 07/17 after a mechanical fall at home.  Fall was witnessed.  Patient did not syncopized.  Patient's trauma evaluation was negative in the emergency department.  Patient was noted to have profound weakness with attempts to lift himself off of the toilet.  PT/OT recommended short-term rehab.  Today, wife at bedside is declining short-term rehab.  Family would like to be discharged with outpatient physical therapy/Occupational Therapy.      PMH:   Past Medical History:   Diagnosis Date    Cerebrovascular accident (CVA) due to thrombosis of left middle cerebral artery (HCC) 07/29/2018    Chronic kidney disease     Coronary artery disease     Diabetes mellitus (HCC)     not on meds    Dialysis patient (HCC)     M-W-F    Fistula     left upper arm for hemodialyis    GERD (gastroesophageal reflux disease)     History of coronary artery stent placement     x3    Hypercholesteremia     Hyperlipidemia     Hypertension     Infectious viral hepatitis     B as child    Limb alert care status     no BP/IV left arm    Neuropathy     Obesity     Osteomyelitis (HCC)     last assessed " "11/4/16-per son not currently    PVC's (premature ventricular contractions)     sees [facility]    Stroke (HCC)     last weeof July 2018 [facility]    TIA (transient ischemic attack) 10/28/2018    Wears dentures     Wears glasses        Past Surgical Hx:   Past Surgical History:   Procedure Laterality Date    ABDOMINAL SURGERY      CARDIAC CATHETERIZATION N/A 05/02/2022    Procedure: Cardiac Coronary Angiogram;  Surgeon: Sam Davis MD;  [facility];  Service: Cardiology    CARDIAC CATHETERIZATION N/A 05/02/2022    Procedure: Cardiac pci;  Surgeon: Sam Davis MD;  [facility];  Service: Cardiology    CARDIAC CATHETERIZATION  02/01/2023    Procedure: Cardiac catheterization;  Surgeon: Sam Davis MD;  [facility];  Service: Cardiology    CARDIAC CATHETERIZATION N/A 02/01/2023    Procedure: Cardiac pci;  Surgeon: Sam Davis MD;  [facility];  Service: Cardiology    CARDIAC CATHETERIZATION N/A 02/01/2023    Procedure: Cardiac Coronary Angiogram;  Surgeon: Sam Davis MD;  [facility];  Service: Cardiology    CARDIAC CATHETERIZATION N/A 02/01/2023    Procedure: Cardiac other-IVUS;  Surgeon: Sam Davis MD;  [facility];  Service: Cardiology    CHOLECYSTECTOMY      Percutaneous    COLONOSCOPY      CYSTOSCOPY      HEMODIALYSIS ADULT  1/22/2024    HEMODIALYSIS ADULT  1/24/2024    IR LOWER EXTREMITY ANGIOGRAM  9/29/2023    IR LOWER EXTREMITY ANGIOGRAM  2/26/2024    OTHER SURGICAL HISTORY      \"stimulator to control bowel movements\"    VT ESOPHAGOGASTRODUODENOSCOPY TRANSORAL DIAGNOSTIC N/A 09/27/2016    Procedure: ESOPHAGOGASTRODUODENOSCOPY (EGD);  Surgeon: Adele Rowe MD;  Location: [facility] GI [facility];  Service: Gastroenterology    VT LAPAROSCOPY SURG CHOLECYSTECTOMY N/A 02/29/2016    Procedure: LAPAROSCOPIC CHOLECYSTECTOMY ;  Surgeon: Cliff Roman DO;  Location: [facility];  Service: General    VT " "SLCTV CATHJ 3RD+ ORD SLCTV ABDL PEL/LXTR BRNCH Left 2023    Procedure: Left leg angiogram with intervention;  Surgeon: Michelle Galvan MD;  Location: [facility];  Service: Vascular    IL SLCTV CATHJ 3RD+ ORD SLCTV ABDL PEL/LXTR BRNCH Left 2024    Procedure: Left leg angiogram antegrade access, left popliteal angioplasty;  Surgeon: Michelle Galvan MD;  Location: [facility];  Service: Vascular    ROTATOR CUFF REPAIR Right     SPINAL CORD STIMULATOR IMPLANT      \"Medtronic interstim model # 3058- in lower back to control bowel movements-currently turned off-battery is dead\"    TOE AMPUTATION Right 10/28/2016    Procedure: 3RD TOE AMPUTATION ;  Surgeon: Anjel Salas DPM;  Location: [facility];  Service:         Pain:  Location:     Restin    Sitting too lon/10 in the L leg. This resolves after walking around     Objective    Upper Extremities:  Pt is right hand dominant                MARIFER: RUE: 50lb LUE: 20lb  The age norm is approximately 76-89 lbs, indicating decreased  strength.                2 point pinch: RUE: 5, LUE: 1  3 point pinch: RUE: 6, LUE: 1  Lateral pinch: RUE: 11, LUE: 4                Range of Motion:  AROM:   R UE   - Shoulder flexion: 93  - Shoulder scaption/abduction:101  - Shoulder extension:   - Shoulder internal rotation: WNL  - Shoulder external rotation:32  - Elbow flexion: WNL  - Elbow extension: WNL  - Wrist flexion:WNL  - Wrist extension: WNL  - Pronation: WNL  - Supination: WNL  - Radial deviation: WNL  - Ulnar deviation: WNL  - Finger extension: WNL   - Finger flexion: WNL    L UE: WFL     Subluxation: none    Scapular winging: none    Spasticity: none     Finger to Nose Testing with Visual Occlusion (proprioception):  WNL    Finger to Nose Testing without Visual Occlusion: WNL     Monofilaments/sensory testing: WNL    Functional Cognition:  Highest level of education: High school    Víctor Cognitive Assessment Version 8.1 (MoCA " V8.1)  Visuospatial/executive functionin/5  Namin/3  Memory: 1st trial:  5/5, 2nd trial:  4/5  Attention/concentration: 2/2  List of letters:   Serial Seven Subtraction:  2/3 w/ 3 errors  Language/sentence repetition:  1/2  Language Fluency:  0/1  Abstract/Correlational Thinkin/2  Delayed Recall:  0/5  Orientation:  6   Memory Index Score: 2/15  1 point added for education level   Raw Score:  830, MIS:  2/15, indicative of SEVERE neurocognitive impairments.    MoCA Scoring        Normal: 26+         Mild Cognitive Impairment: 18-25          Moderate Cognitive Impairment: 10-17         Severe Cognitive Impairment: <10    GOALS:   Short Term Goals:  Pt will increase B UE  strength by 4 lbs to improve performance with ADLs   Pt will increase R lateral pinch strength by 2 lb to improve performance with ADLs/dressing  Pt will demonstrate improved functional cognition as evidenced by improving score on the MoCA to 10/30 to improve performance during ADLs and IADLs      Long Term Goals: 8-12 weeks  Pt will increase B UE  strength by 7 lbs to improve performance with ADLs   Pt will increase R lateral pinch strength by 3 lb to improve performance with ADLs/dressing  Pt will demonstrate improved functional cognition as evidenced by improving score on the MoCA to 1230 to improve performance during ADLs and IADLs  Pt will improve functional activity tolerance and dressing to complete with no more than mod(A) from his wife per report.   Pt will improve functional activity tolerance and bathroom skills to complete with no more than mod(A) from his wife per report.     OTHER PLANNED THERAPY INTERVENTIONS:   Supine, seated, and in stance neuro re-ed  Tricep AG  NMES/FES  FMC/prehension  Timed Trials  Manual tx  Hand to target  Sensory re-ed  Seated functional reach: crossing midline  Supine place and hold  WBearing strategies   Closed chain activities  Open chain activities  Internal and external  memory aides  Multimatrix for saccades/ visual clutter/attention  Hypersensitivity strategies education  Multi-modal environment  Sustained/alternating/divided attention  Tracking tube  Oculomotor control:  saccades, con/divergence  Conv./div. Dynamic tasks  Work stations with timed transitions  Temporal Awareness  Memory and mental manipulation  Auditory processing with immediate recall  Memory retention with immediate and delayed recall  Edu on cog/vision apps      Objective Measurement Tracker:    IE (8/13/24) 1st Re-eval: () 2nd Re-eval: ( 3rd Re-eval: ()    MoCA 8/30       TM Test A        TM Test B                Nine Hole Peg Test R: N/A  L: N/A       Functional Dexterity Test         Strength R: 50lb, L: 20lb       Lateral Pinch Strength R: 11lb, L: 4lb       2 Point Pinch Strength R: 5lb, L: 1lb           3 Point Pinch Strength R: 6lb, L: 1lb

## 2024-08-15 ENCOUNTER — CONSULT (OUTPATIENT)
Dept: HEMATOLOGY ONCOLOGY | Facility: MEDICAL CENTER | Age: 64
End: 2024-08-15
Payer: MEDICARE

## 2024-08-15 ENCOUNTER — OFFICE VISIT (OUTPATIENT)
Dept: PHYSICAL THERAPY | Facility: CLINIC | Age: 64
End: 2024-08-15
Payer: MEDICARE

## 2024-08-15 VITALS
WEIGHT: 210 LBS | HEART RATE: 64 BPM | OXYGEN SATURATION: 99 % | TEMPERATURE: 97.8 F | DIASTOLIC BLOOD PRESSURE: 62 MMHG | HEIGHT: 68 IN | RESPIRATION RATE: 16 BRPM | BODY MASS INDEX: 31.83 KG/M2 | SYSTOLIC BLOOD PRESSURE: 106 MMHG

## 2024-08-15 DIAGNOSIS — R53.81 PHYSICAL DECONDITIONING: ICD-10-CM

## 2024-08-15 DIAGNOSIS — E11.42 DIABETIC POLYNEUROPATHY ASSOCIATED WITH TYPE 2 DIABETES MELLITUS (HCC): Primary | ICD-10-CM

## 2024-08-15 DIAGNOSIS — I82.451 ACUTE EMBOLISM AND THROMBOSIS OF RIGHT PERONEAL VEIN (HCC): ICD-10-CM

## 2024-08-15 DIAGNOSIS — I82.409 DVT (DEEP VENOUS THROMBOSIS) (HCC): ICD-10-CM

## 2024-08-15 DIAGNOSIS — R26.89 POOR BALANCE: ICD-10-CM

## 2024-08-15 PROCEDURE — 97112 NEUROMUSCULAR REEDUCATION: CPT

## 2024-08-15 PROCEDURE — 97530 THERAPEUTIC ACTIVITIES: CPT

## 2024-08-15 PROCEDURE — 99204 OFFICE O/P NEW MOD 45 MIN: CPT | Performed by: PHYSICIAN ASSISTANT

## 2024-08-15 NOTE — PROGRESS NOTES
Daily Note     Today's date: 8/15/2024  Patient name: Asad Galloway  : 1960  MRN: [id number]  Referring provider: Franklin Church DPM  Dx:   Encounter Diagnosis     ICD-10-CM    1. Diabetic polyneuropathy associated with type 2 diabetes mellitus (HCC)  E11.42       2. Poor balance  R26.89       3. Physical deconditioning  R53.81           Subjective: Patient states he feels stronger with his walking.    Objective: See treatment diary below      Assessment: Continues to be challenge with STS, requiring 1 foam to complete without UE support. Some improvement with cues to rock forward. Noted difficulty with eccentric control. Very apprehensive to perform balance training. Overall decreased endurance with noted weakness and fatigue requiring frequent breaks. Continues to require motivation and encouragement to try exercises.    . Patient demonstrated fatigue post treatment and would benefit from continued PT      Plan: Continue per plan of care.  Progress treatment as tolerated.         POC Expires Auth Status Start Date Expiration Date PT Visit Limit      BOMN   Date      Used 1 2 3     Remaining           Diagnosis:  Weakness, deconditioning   Precautions:  Dialysis (on kidney transplant), Hx of stroke, DM, current blood clot in R leg   Comparable signs 1) Walking     Primary Impairments: 1) Generalized weakness  2) Impaired proprioception   Patient Goals Improve independent   Manual Therapy 7/25 7/30 8/1 8/8 8/15                                        Re-evaluation          Exercise Diary          Therapeutic Exercise         Bike/Nu Step  5 min NuStep 5min Nustep 5 min Nustep 5 min    Hip 3 way  RLE kicking only 10x ea       Leg press         Seated HR/TR  20x Stand unilat x10ea Standing 10x     marches  Seated 2x10 B Standing alt x10ea, seated pink TB 2x5ea Standing 15x     Hamstring curls  Seated, blue TB 2x10 ea       LAQ  10x B Alt x15ea      Seated clams  Grn TB 2x10 Pink TB 2x10       Seated hip add  2x10 2x10      Neuromuscular Re-education         EO/EC   Foam: FA/EC 30''X2  Foam:   FA/EC 30''X2  FTEO 30''x3  Semi tandem EO 30''x2   Foam:   FT/EC 30''X2    Semi tandem EO 30''x2    EO/EC foam         Wobble board    Taps 20x ea Taps 20x  Balance 1' ea    Seated Chest press   Purple ball x10      Tandem walking    3 laps 1 UE support 2 laps 1 UE support    sidestepping    3 laps B UE support 2 laps B UE support    hurdles     Fwd-2 laps  Lat-1 lapx2             Therapeutic Activities         Education  POC, diagnosis, expecations        STS  2 foam on chair, 2x10 no UE T/o standard chair, min(A) especially @ end 1 foam on chair, 2x10 1 foam on chair-10x    Step ups     6'' fwd  R-10x  L-5x                      Modalities

## 2024-08-15 NOTE — PROGRESS NOTES
[facility] HEMATOLOGY ONCOLOGY SPECIALISTS ARLINE [facility] [id number]  Hematology Ambulatory Consult  Asad Galloway, 1960, [id number]  8/15/2024      Assessment and Plan   1. DVT (deep venous thrombosis) (HCC)  Patient presents for evaluation of DVT.    He has complicated past medical history.  He has end-stage renal disease on dialysis, congestive heart failure.  History of CVA in 2018.  History of cardiac stents placed in 2020.  History of peripheral arterial disease.  He is currently on Effient.  Aspirin was stopped when he started Eliquis    He has recent finding of DVT in one of the paired peroneal veins at the proximal cath in the right lower extremity.  He suffered a fall about 1 week prior to this in which he bruised his right ankle and was minimally mobile for a few days.    He is currently on Eliquis.    He has no previous history of VTE.  No family history of VTE.    It seems likely that DVT was provoked by fall and subsequent lack of mobility.    He is currently on the kidney transplant list through [facility].  His son is concerned about him being on Eliquis as he feels that this will be a contraindication to him receiving a kidney.  He prefers to keep him on anticoagulation for the shortest period possible.    I think that he should continue Eliquis for about 3 more months time.  He will repeat a venous Doppler prior to his return visit.  We will see if we can discontinue anticoagulation at that time.    I also question whether his renal transplant team would be more comfortable having him repeat a hypercoagulable workup.  His son says that he has had this done several times previously and the testing has been unremarkable.    The patient is scheduled for follow-up in approximately 3 months.     Patient voiced agreement and understanding to the above.   Patient knows to call the Hematology/Oncology office with any questions and concerns  regarding the above.    Barrier(s) to care: None.   The patient is able to self care.    Subjective     Chief Complaint   Patient presents with    Consult       Referring provider    Christine Silva MD  [address] [place] [address]    History of present illness:   Patient presents for evaluation of DVT.    His past medical history is significant for end-stage renal disease on dialysis, congestive heart failure, anemia, hyperlipidemia, type 2 diabetes (no longer on any medications). He has history of CVA in July 2018. He also had stents placed in 2022. He is on Effient and aspirin. His son says he is resistant to plavix.    He is on the kidney transplant list at [facility].    He had a recent hospitalization at [facility] from 7/14/2024 through 7/16/2024 for cough, sore throat, fatigue and swollen right leg.    Venous duplex was significant for DVT in one of the paired peroneal veins at the proximal calf.  He was started on heparin drip and then transitioned to Eliquis on discharge.    He was seen in the ED previous to this on 7/5/2024 after suffering a fall.  He complained primarily of right ankle pain.  X-ray did not show any dislocation or fracture.    He denies any prior history of VTE.  No family history of VTE.    His son reports that since he has been on the kidney transplant list he has undergone thrombosis panel of testing every 3 years time (I cannot find these results in the charting.)  He says that this was completed most recently 1 year ago and the testing was unremarkable.    Todd also recently started physical therapy and is increasing his mobility.  He is now walking with a cane instead of a walker which he is pleased about.  He denies chest pain or shortness of breath.  He has no bleeding on Eliquis. He denies nausea or vomiting.  No significant swelling in either of the lower extremities.    Review of Systems   Constitutional:  Positive for fatigue. Negative for  activity change, appetite change and fever.   HENT:  Negative for nosebleeds.    Respiratory:  Negative for cough, choking and shortness of breath.    Cardiovascular:  Negative for chest pain, palpitations and leg swelling.   Gastrointestinal:  Negative for abdominal distention, abdominal pain, anal bleeding, blood in stool, constipation, diarrhea, nausea and vomiting.   Endocrine: Negative for cold intolerance.   Genitourinary:  Negative for hematuria.   Musculoskeletal:  Positive for arthralgias. Negative for myalgias.   Skin:  Negative for color change, pallor and rash.   Allergic/Immunologic: Negative for immunocompromised state.   Neurological:  Negative for headaches.   Hematological:  Negative for adenopathy. Does not bruise/bleed easily.   All other systems reviewed and are negative.      Past Medical History:   Diagnosis Date    Cerebrovascular accident (CVA) due to thrombosis of left middle cerebral artery (HCC) 07/29/2018    Chronic kidney disease     Coronary artery disease     Diabetes mellitus (HCC)     not on meds    Dialysis patient (HCC)     M-W-F    Fistula     left upper arm for hemodialyis    GERD (gastroesophageal reflux disease)     History of coronary artery stent placement     x3    Hypercholesteremia     Hyperlipidemia     Hypertension     Infectious viral hepatitis     B as child    Limb alert care status     no BP/IV left arm    Neuropathy     Obesity     Osteomyelitis (HCC)     last assessed 11/4/16-per son not currently    PVC's (premature ventricular contractions)     sees  Cardio    Stroke (HCC)     last weeof July 2018 [facility]    TIA (transient ischemic attack) 10/28/2018    Wears dentures     Wears glasses      Past Surgical History:   Procedure Laterality Date    ABDOMINAL SURGERY      CARDIAC CATHETERIZATION N/A 05/02/2022    Procedure: Cardiac Coronary Angiogram;  Surgeon: Sam Davis MD;  [facility];  Service: Cardiology    CARDIAC  "CATHETERIZATION N/A 05/02/2022    Procedure: Cardiac pci;  Surgeon: Sam Davis MD;  [facility];  Service: Cardiology    CARDIAC CATHETERIZATION  02/01/2023    Procedure: Cardiac catheterization;  Surgeon: Sam Davis MD;  [facility]: [facility] CARDIAC [facility];  Service: Cardiology    CARDIAC CATHETERIZATION N/A 02/01/2023    Procedure: Cardiac pci;  Surgeon: Sam Davis MD;  [facility]: [facility]ARD[facility];  Service: Cardiology    CARDIAC CATHETERIZATION N/A 02/01/2023    Procedure: Cardiac Coronary Angiogram;  Surgeon: Sam Davis MD;  [facility]: [facility]ARD[facility];  Service: Cardiology    CARDIAC CATHETERIZATION N/A 02/01/2023    Procedure: Cardiac other-IVUS;  Surgeon: Sam Davis MD;  [facility]: [facility] CARD[facility];  Service: Cardiology    CHOLECYSTECTOMY      Percutaneous    COLONOSCOPY      CYSTOSCOPY      HEMODIALYSIS ADULT  1/22/2024    HEMODIALYSIS ADULT  1/24/2024    IR LOWER EXTREMITY ANGIOGRAM  9/29/2023    IR LOWER EXTREMITY ANGIOGRAM  2/26/2024    OTHER SURGICAL HISTORY      \"stimulator to control bowel movements\"    CO ESOPHAGOGASTRODUODENOSCOPY TRANSORAL DIAGNOSTIC N/A 09/27/2016    Procedure: ESOPHAGOGASTRODUODENOSCOPY (EGD);  Surgeon: Adele Rowe MD;  Location: [facility] GI LAB;  Service: Gastroenterology    CO LAPAROSCOPY SURG CHOLECYSTECTOMY N/A 02/29/2016    Procedure: LAPAROSCOPIC CHOLECYSTECTOMY ;  Surgeon: Cliff Roman DO;  Location: [facility] Main OR;  Service: General    CO SLCTV CATHJ 3RD+ ORD SLCTV ABDL PEL/LXTR BRNCH Left 9/29/2023    Procedure: Left leg angiogram with intervention;  Surgeon: Michelle Galvan MD;  Location: [facility] MAIN [facility];  Service: Vascular    CO SLCTV CATHJ 3RD+ ORD SLCTV ABDL PEL/LXTR BRNCH Left 2/26/2024    Procedure: Left leg angiogram antegrade access, left popliteal angioplasty;  Surgeon: Michelle Galvan MD;  Location: [facility] MA[facility];  Service: Vascular    ROTATOR CUFF REPAIR Right     SPINAL CORD STIMULATOR IMPLANT      \"Medtronic interstim model # 3058- " "in lower back to control bowel movements-currently turned off-battery is dead\"    TOE AMPUTATION Right 10/28/2016    Procedure: 3RD TOE AMPUTATION ;  Surgeon: Anjel Salas DPM;  Location: [facility];  Service:      Family History   Problem Relation Age of Onset    Leukemia Mother     Liver disease Mother     Lung cancer Mother         heavy smoker - 3 ppd    Heart disease Father     Liver disease Father     Diabetes type I Father     Multiple myeloma Sister     Breast cancer Sister     Urolithiasis Family     Alcohol abuse Neg Hx     Depression Neg Hx     Drug abuse Neg Hx     Substance Abuse Neg Hx     Mental illness Neg Hx      Social History     Socioeconomic History    Marital status: /Civil Union     Spouse name: None    Number of children: None    Years of education: None    Highest education level: Not asked   Occupational History    Occupation: disabled   Tobacco Use    Smoking status: Never    Smokeless tobacco: Never   Vaping Use    Vaping status: Never Used   Substance and Sexual Activity    Alcohol use: Never    Drug use: No    Sexual activity: Not Currently     Partners: Female     Comment: defer   Other Topics Concern    None   Social History Narrative    Daily caffeine consumption 2-3 servings a day     Social Determinants of Health     Financial Resource Strain: Patient Declined (7/13/2023)    Overall Financial Resource Strain (CARDIA)     Difficulty of Paying Living Expenses: Patient declined   Food Insecurity: No Food Insecurity (7/18/2024)    Hunger Vital Sign     Worried About Running Out of Food in the Last Year: Never true     Ran Out of Food in the Last Year: Never true   Transportation Needs: No Transportation Needs (7/18/2024)    PRAPARE - Transportation     Lack of Transportation (Medical): No     Lack of Transportation (Non-Medical): No   Physical Activity: Not on file   Stress: No Stress Concern Present (1/18/2019)    [place] [employer] - Occupational " Stress Questionnaire     Feeling of Stress : Only a little   Social Connections: Unknown (1/18/2019)    Social Connection and Isolation Panel [NHANES]     Frequency of Communication with Friends and Family: Not on file     Frequency of Social Gatherings with Friends and Family: Not on file     Attends Latter day Services: Not on file     Active Member of Clubs or Organizations: Not on file     Attends Club or Organization Meetings: Not on file     Marital Status:    Intimate Partner Violence: Not At Risk (1/18/2019)    Humiliation, Afraid, Rape, and Kick questionnaire     Fear of Current or Ex-Partner: No     Emotionally Abused: No     Physically Abused: No     Sexually Abused: No   Housing Stability: Low Risk  (7/18/2024)    Housing Stability Vital Sign     Unable to Pay for Housing in the Last Year: No     Number of Times Moved in the Last Year: 0     Homeless in the Last Year: No         Current Outpatient Medications:     apixaban (ELIQUIS) 5 mg, Take 1 tablet (5 mg total) by mouth 2 (two) times a day, Disp: 60 tablet, Rfl: 0    atorvastatin (LIPITOR) 40 mg tablet, Take 1 tablet (40 mg total) by mouth daily with dinner, Disp: 30 tablet, Rfl: 5    calcitriol (ROCALTROL) 0.5 MCG capsule, Take 1 capsule (0.5 mcg total) by mouth 3 (three) times a week, Disp: 12 capsule, Rfl: 0    cinacalcet (SENSIPAR) 60 MG tablet, Take 2 tablets (120 mg total) by mouth 3 (three) times a week, Disp: 24 tablet, Rfl: 0    divalproex sodium (DEPAKOTE SPRINKLE) 125 MG capsule, Take 2 capsules (250 mg total) by mouth every 12 (twelve) hours, Disp: 360 capsule, Rfl: 1    meclizine (ANTIVERT) 12.5 MG tablet, Take 1 tablet (12.5 mg total) by mouth every 8 (eight) hours as needed for dizziness, Disp: 30 tablet, Rfl: 0    melatonin 3 mg, Take 1 tablet (3 mg total) by mouth daily at bedtime, Disp: 30 tablet, Rfl: 0    metoprolol succinate (TOPROL-XL) 50 mg 24 hr tablet, Take 1 tablet (50 mg total) by mouth 2 (two) times a day, Disp: 60  "tablet, Rfl: 0    MIDODRINE HCL PO, Take 5 mg by mouth PRN w/ dialysis, Disp: , Rfl:     prasugrel (EFFIENT) tablet, Take 1 tablet (5 mg total) by mouth daily, Disp: 90 tablet, Rfl: 1    sacubitril-valsartan (Entresto) 24-26 MG TABS, Take 1 tablet by mouth in the morning, Disp: 30 tablet, Rfl: 0    Sevelamer Carbonate 2.4 g PACK, VIGOROUSLY MIX CONTENTS OF 1 PACKET IN WATER (IT WILL NOT DISSOLVE) AND DRINK 3 TIMES DAILY WITH MEALS, Disp: , Rfl:     Vitamin D3 1.25 MG (56444 UT) capsule, TAKE 1 CAPSULE BY MOUTH ONE TIME PER WEEK, Disp: 12 capsule, Rfl: 4  No Known Allergies    Objective   /62 (BP Location: Right arm, Patient Position: Sitting, Cuff Size: Adult)   Pulse 64   Temp 97.8 °F (36.6 °C) (Temporal)   Resp 16   Ht 5' 8\" (1.727 m)   Wt 95.3 kg (210 lb) Comment: Pt. reported  SpO2 99%   BMI 31.93 kg/m²   Physical Exam  Constitutional:       General: He is not in acute distress.     Appearance: He is well-developed and normal weight.      Comments: Chronically ill-appearing.   HENT:      Head: Normocephalic and atraumatic.      Right Ear: External ear normal.      Left Ear: External ear normal.      Nose: Nose normal.   Eyes:      General: No scleral icterus.     Conjunctiva/sclera: Conjunctivae normal.   Cardiovascular:      Rate and Rhythm: Normal rate and regular rhythm.   Pulmonary:      Effort: No respiratory distress.   Abdominal:      General: Abdomen is flat. There is no distension.      Palpations: Abdomen is soft.      Tenderness: There is no abdominal tenderness.   Musculoskeletal:      Comments: Ambulates with a cane.   Skin:     Findings: No rash (on exposed skin.).   Neurological:      Mental Status: He is alert and oriented to person, place, and time.   Psychiatric:         Mood and Affect: Mood normal.         Thought Content: Thought content normal.         Judgment: Judgment normal.     Extremities: No lower extremity edema bilaterally.    Result Review  Labs:      Imaging: "     7/14/2024 VAS VENOUS DUPLEX -LOWER LIMB UNILATERAL   CONCLUSION:  Impression:  RIGHT LOWER LIMB  Evidence of acute vs sub acute, occlusive deep vein thrombosis within (one) of  the paired peroneal veins at the proximal calf.  No evidence of superficial thrombophlebitis noted.  Doppler evaluation shows a normal response to augmentation maneuvers.  Popliteal, posterior tibial and anterior tibial arterial Doppler waveform's are  triphasic/hyperemic.     LEFT LOWER LIMB LIMITED  Evaluation shows no evidence of thrombus in the common femoral vein.  Doppler evaluation shows a normal response to augmentation maneuvers.     Technical findings were given to Jamaal Stark DO via [employer] secure chat.    Please note:  This report has been generated by a voice recognition software system. Therefore there may be syntax, spelling, and/or grammatical errors. Please call if you have any questions.

## 2024-08-20 ENCOUNTER — OFFICE VISIT (OUTPATIENT)
Facility: CLINIC | Age: 64
End: 2024-08-20
Payer: MEDICARE

## 2024-08-20 ENCOUNTER — APPOINTMENT (OUTPATIENT)
Dept: PHYSICAL THERAPY | Facility: CLINIC | Age: 64
End: 2024-08-20
Payer: MEDICARE

## 2024-08-20 DIAGNOSIS — Z86.73 HISTORY OF CVA (CEREBROVASCULAR ACCIDENT): ICD-10-CM

## 2024-08-20 DIAGNOSIS — R42 DIZZINESS: Primary | ICD-10-CM

## 2024-08-20 DIAGNOSIS — R41.89 COGNITIVE DEFICITS: ICD-10-CM

## 2024-08-20 PROCEDURE — 97530 THERAPEUTIC ACTIVITIES: CPT

## 2024-08-20 NOTE — PROGRESS NOTES
"Daily Note     Today's date: 2024  Patient name: Asad Galloway  : 1960  MRN: [id number]  Referring provider: Beatriz Hung PA-C  Dx:   Encounter Diagnoses   Name Primary?    Dizziness Yes    History of CVA (cerebrovascular accident)     Cognitive deficits        Start Time: 1800  Stop Time: 1845  Total time in clinic (min): 45 minutes    PLAN OF CARE START: 24  PLAN OF CARE END: 24  FREQUENCY: 2-3x/week  PRECAUTIONS dialysis 3x/week, falls    Subjective: \"I want my kidney\"    Objective: See treatment below.    NINE-HOLE PEG TEST: assesses dexterity/fine motor coordination. Alternative scoring: the number of pegs placed in 50 or 100 seconds can be recorded.    R hand: 112 seconds   L hand: 75 seconds      Norms:   Sex Age Average R Average L   Male 61-65 20.87 21.60     Pt demonstrates decreased FMC compared to norms for age/sex       Trail making Part A and Part B:   Part A: 1:45 with 4  VCs for recall of instructions, problem solving  Indicating deficits: Part A deficit > 33.22 seconds for age related norms    Manual Muscle Testing:  R UE:  - Shoulder flexors: 4+/5  - Shoulder extensors: 5/5  - Shoulder abductors: 4+/5   - Shoulder external rotators: 4/5   - Shoulder internal rotators: 5/5  - Elbow flexors: 5/5  - Elbow extensors: 4+/5  - Wrist flexors: 5/5  - Wrist extensors: 5/5  L UE:  - Shoulder flexors: 4/5  - Shoulder extensors: 5/5  - Shoulder abductors: 4+/5   - Shoulder external rotators: 4+/5   - Shoulder internal rotators: 5/5  - Elbow flexors: 5/5  - Elbow extensors: 4+/5  - Wrist flexors: 5/5  - Wrist extensors: 5/5    TA:   -Completed number web with pt placing marbles on top in numerical order with the L hand (attempted using yellow clothes pin, however this was too difficult). Pt then removing all marbles using red clothes pin with the R hand.   -Completed sock folding activity. All socks displayed on table with pt asked to find two matching socks then fold them. Pt required " mod(A)  to identify matching socks and min/mod(A) to fold them together. Focus on attention, visual discrimination, bilateral coordination.     Assessment: Tolerated treatment fair. Pt demonstrating in session impaired attention, visual discrimination, endurance, bilateral coordination, hand strength. Pt with significantly impaired FMC bilaterally, and proximal weakness bilaterally. Limited endurance observed during activities provided today, requiring a few rest breaks.Pt would benefit from continued OT services to address these areas.      Plan: Continued skilled OT per POC.  Pt was recommended for PT evaluation - set up for 9/5/24. Possible recommendation for ST evaluation.     New Goals added 8/20:   Pt will demonstrate improved sustained attention as evidenced by completing TM part A within 1 minute with 0 verbal cues.   Pt will demonstrate improved FMC as evidenced by improving time on 9-hole peg test by 8 seconds with each hand.

## 2024-08-22 ENCOUNTER — APPOINTMENT (OUTPATIENT)
Dept: PHYSICAL THERAPY | Facility: CLINIC | Age: 64
End: 2024-08-22
Payer: MEDICARE

## 2024-08-22 ENCOUNTER — OFFICE VISIT (OUTPATIENT)
Facility: CLINIC | Age: 64
End: 2024-08-22
Payer: MEDICARE

## 2024-08-22 ENCOUNTER — TELEPHONE (OUTPATIENT)
Age: 64
End: 2024-08-22

## 2024-08-22 DIAGNOSIS — R42 DIZZINESS: Primary | ICD-10-CM

## 2024-08-22 DIAGNOSIS — Z86.73 HISTORY OF CVA (CEREBROVASCULAR ACCIDENT): ICD-10-CM

## 2024-08-22 DIAGNOSIS — R41.89 COGNITIVE DEFICITS: ICD-10-CM

## 2024-08-22 PROCEDURE — 97530 THERAPEUTIC ACTIVITIES: CPT

## 2024-08-22 NOTE — PROGRESS NOTES
"Daily Note     Today's date: 2024  Patient name: Asad Galloway  : 1960  MRN: [id number]  Referring provider: Beatriz Hung PA-C  Dx:   Encounter Diagnoses   Name Primary?    Dizziness Yes    History of CVA (cerebrovascular accident)     Cognitive deficits          Start Time: 1715  Stop Time: 1755  Total time in clinic (min): 40 minutes    PLAN OF CARE START: 24  PLAN OF CARE END: 24  FREQUENCY: 2-3x/week  PRECAUTIONS dialysis 3x/week, falls, terrified of dogs (do not let him see Omega at all), primary language is Sen[place]e but fluent in English    Subjective: \"If I see him I will run\"- in regards to Omega the FD    Objective: See treatment below.  TA:  -Multimatrix rotation and stacking in alphabetical order with focus on sequencing, sustained attention, dexterity.  Requires min cues to locate items, 1 error in sequencing, 1 error in letter matching and significantly inc time for processing    -Categories (basic) activity performed with focus on abstraction and logical reasoning.  Initially requires Roni, progresses to Jourdan with significantly inc time and assist to read items (unclear aphasia vs language barrier vs combo)    -Matching talent activity performed with focus on sustained attn, spatial relationships, dexterity.  Requires OT to complete 1/3 as visual model 2* difficulty comprehending directions.  Able to complete an additional third with minimally inc time, but then requires field of 3 to select remaining items    Assessment: Pt tolerated session fairly.  1 brief period of pt crying recalling his parents passed away.  Demonstrating significant difficulty with direction following, , sustained attn, sequencing, reasoning.    Plan: Continued skilled OT per POC.  Pt was recommended for PT evaluation - set up for 24. Recommendation for ST evaluation.     New Goals added :   Pt will demonstrate improved sustained attention as evidenced by completing TM part A within 1 " minute with 0 verbal cues.   Pt will demonstrate improved FMC as evidenced by improving time on 9-hole peg test by 8 seconds with each hand.

## 2024-08-22 NOTE — TELEPHONE ENCOUNTER
Spoke with patient's son Sam, requesting Dr Tan to review patient's NM stress test results that was done today through [facility] and to call to discuss results if possible at [phone].

## 2024-08-23 ENCOUNTER — HOSPITAL ENCOUNTER (EMERGENCY)
Facility: HOSPITAL | Age: 64
Discharge: HOME | End: 2024-08-23
Attending: EMERGENCY MEDICINE
Payer: MEDICARE

## 2024-08-23 VITALS
HEART RATE: 60 BPM | SYSTOLIC BLOOD PRESSURE: 110 MMHG | DIASTOLIC BLOOD PRESSURE: 56 MMHG | RESPIRATION RATE: 18 BRPM | OXYGEN SATURATION: 98 % | TEMPERATURE: 98 F

## 2024-08-23 DIAGNOSIS — R58 BLEEDING: Primary | ICD-10-CM

## 2024-08-23 PROCEDURE — 99284 EMERGENCY DEPT VISIT MOD MDM: CPT

## 2024-08-23 PROCEDURE — 99283 EMERGENCY DEPT VISIT LOW MDM: CPT | Performed by: EMERGENCY MEDICINE

## 2024-08-23 NOTE — ED ATTENDING ATTESTATION
8/23/2024  I, Cl Menjivar MD, saw and evaluated the patient. I have discussed the patient with the resident/non-physician practitioner and agree with the resident's/non-physician practitioner's findings, Plan of Care, and MDM as documented in the resident's/non-physician practitioner's note, except where noted. All available labs and Radiology studies were reviewed.  I was present for key portions of any procedure(s) performed by the resident/non-physician practitioner and I was immediately available to provide assistance.       At this point I agree with the current assessment done in the Emergency Department.  I have conducted an independent evaluation of this patient a history and physical is as follows:    64-year-old male, presents for evaluation of bleeding from dialysis fistula.  Had dialysis earlier today, reports bleeding noted at home.  On exam, left dialysis fistula present, no active bleeding.    ED Course     Patient with no bleeding in ED, wife would like to take patient home and does not want to stay for monitor in ED, states she has to get home to family.    Critical Care Time  Procedures

## 2024-08-23 NOTE — DISCHARGE INSTRUCTIONS
Please return to the ER if the fistula starts bleeding again.  If Asad takes the wrapping off but it is not bleeding please rewrap it and keep it wrapped until approximately 3 PM tomorrow.

## 2024-08-23 NOTE — ED PROVIDER NOTES
History  Chief Complaint   Patient presents with    Bleeding/Bruising     Per ems, bleeding fistula L arm. Bleeding controlled.      64-year-old male brought in by EMS for bleeding AV fistula.  Per EMS patient has history of CKD on dialysis and received dialysis today, he got home took off his bandage and began to have bleeding from the fistula.  Wife describes it as shooting blood and she applied direct pressure.  EMS states bleeding is stopped by the time they got there, they applied pressure dressing.  Per EMS patient also is anticoagulated.  EMS also states there was minimal total bleeding.  Patient has no complaints at this time.  He is A&O x 2, oriented to person and place but not time, chart review reflects mild cognitive impairment at baseline.  Per wife he is at baseline.  He denies chest pain, shortness of breath, headaches, lightheadedness, dizziness, fevers, chills, weakness.      History provided by:  Patient, spouse, medical records and EMS personnel      Prior to Admission Medications   Prescriptions Last Dose Informant Patient Reported? Taking?   MIDODRINE HCL PO   Yes No   Sig: Take 5 mg by mouth PRN w/ dialysis   Sevelamer Carbonate 2.4 g PACK  Child Yes No   Sig: VIGOROUSLY MIX CONTENTS OF 1 PACKET IN WATER (IT WILL NOT DISSOLVE) AND DRINK 3 TIMES DAILY WITH MEALS   Vitamin D3 1.25 MG (65277 UT) capsule  Child No No   Sig: TAKE 1 CAPSULE BY MOUTH ONE TIME PER WEEK   apixaban (ELIQUIS) 5 mg   No No   Sig: Take 1 tablet (5 mg total) by mouth 2 (two) times a day   atorvastatin (LIPITOR) 40 mg tablet   No No   Sig: Take 1 tablet (40 mg total) by mouth daily with dinner   calcitriol (ROCALTROL) 0.5 MCG capsule   No No   Sig: Take 1 capsule (0.5 mcg total) by mouth 3 (three) times a week   cinacalcet (SENSIPAR) 60 MG tablet   No No   Sig: Take 2 tablets (120 mg total) by mouth 3 (three) times a week   divalproex sodium (DEPAKOTE SPRINKLE) 125 MG capsule   No No   Sig: Take 2 capsules (250 mg total) by  mouth every 12 (twelve) hours   meclizine (ANTIVERT) 12.5 MG tablet   No No   Sig: Take 1 tablet (12.5 mg total) by mouth every 8 (eight) hours as needed for dizziness   melatonin 3 mg   No No   Sig: Take 1 tablet (3 mg total) by mouth daily at bedtime   metoprolol succinate (TOPROL-XL) 50 mg 24 hr tablet   No No   Sig: Take 1 tablet (50 mg total) by mouth 2 (two) times a day   prasugrel (EFFIENT) tablet  Child No No   Sig: Take 1 tablet (5 mg total) by mouth daily   sacubitril-valsartan (Entresto) 24-26 MG TABS   No No   Sig: Take 1 tablet by mouth in the morning      Facility-Administered Medications: None       Past Medical History:   Diagnosis Date    Cerebrovascular accident (CVA) due to thrombosis of left middle cerebral artery (HCC) 07/29/2018    Chronic kidney disease     Coronary artery disease     Diabetes mellitus (HCC)     not on meds    Dialysis patient (HCC)     M-W-F    Fistula     left upper arm for hemodialyis    GERD (gastroesophageal reflux disease)     History of coronary artery stent placement     x3    Hypercholesteremia     Hyperlipidemia     Hypertension     Infectious viral hepatitis     B as child    Limb alert care status     no BP/IV left arm    Neuropathy     Obesity     Osteomyelitis (HCC)     last assessed 11/4/16-per son not currently    PVC's (premature ventricular contractions)     sees [facility]    Stroke (HCC)     last weeof July 2018 [facility]    TIA (transient ischemic attack) 10/28/2018    Wears dentures     Wears glasses        Past Surgical History:   Procedure Laterality Date    ABDOMINAL SURGERY      CARDIAC CATHETERIZATION N/A 05/02/2022    Procedure: Cardiac Coronary Angiogram;  Surgeon: Sam Davis MD;  [facility];  Service: Cardiology    CARDIAC CATHETERIZATION N/A 05/02/2022    Procedure: Cardiac pci;  Surgeon: Sam Davis MD;  [facility];  Service: Cardiology    CARDIAC CATHETERIZATION  02/01/2023    Procedure:  "Cardiac catheterization;  Surgeon: Sam Davis MD;  [facility];  Service: Cardiology    CARDIAC CATHETERIZATION N/A 02/01/2023    Procedure: Cardiac pci;  Surgeon: Sam Davis MD;  [facility];  Service: Cardiology    CARDIAC CATHETERIZATION N/A 02/01/2023    Procedure: Cardiac Coronary Angiogram;  Surgeon: Sam Davis MD;  [facility];  Service: Cardiology    CARDIAC CATHETERIZATION N/A 02/01/2023    Procedure: Cardiac other-IVUS;  Surgeon: Sam Davis MD;  [facility];  Service: Cardiology    CHOLECYSTECTOMY      Percutaneous    COLONOSCOPY      CYSTOSCOPY      HEMODIALYSIS ADULT  1/22/2024    HEMODIALYSIS ADULT  1/24/2024    IR LOWER EXTREMITY ANGIOGRAM  9/29/2023    IR LOWER EXTREMITY ANGIOGRAM  2/26/2024    OTHER SURGICAL HISTORY      \"stimulator to control bowel movements\"    VT ESOPHAGOGASTRODUODENOSCOPY TRANSORAL DIAGNOSTIC N/A 09/27/2016    Procedure: ESOPHAGOGASTRODUODENOSCOPY (EGD);  Surgeon: Adele Rowe MD;  Location: [facility];  Service: Gastroenterology    VT LAPAROSCOPY SURG CHOLECYSTECTOMY N/A 02/29/2016    Procedure: LAPAROSCOPIC CHOLECYSTECTOMY ;  Surgeon: Cliff Roman DO;  [facility];  Service: General    VT SLCTV CATHJ 3RD+ ORD SLCTV ABDL PEL/LXTR BRNCH Left 9/29/2023    Procedure: Left leg angiogram with intervention;  Surgeon: Michelle Galvan MD;  [facility];  Service: Vascular    VT SLCTV CATHJ 3RD+ ORD SLCTV ABDL PEL/LXTR BRNCH Left 2/26/2024    Procedure: Left leg angiogram antegrade access, left popliteal angioplasty;  Surgeon: Michelle Galvan MD;  [facility];  Service: Vascular    ROTATOR CUFF REPAIR Right     SPINAL CORD STIMULATOR IMPLANT      \"Medtronic interstim model # 3058- in lower back to control bowel movements-currently turned off-battery is dead\"    TOE AMPUTATION Right 10/28/2016    Procedure: 3RD TOE AMPUTATION ;  Surgeon: Anjel Salas DPM;  " Location[facility];  Service:        Family History   Problem Relation Age of Onset    Leukemia Mother     Liver disease Mother     Lung cancer Mother         heavy smoker - 3 ppd    Heart disease Father     Liver disease Father     Diabetes type I Father     Multiple myeloma Sister     Breast cancer Sister     Urolithiasis Family     Alcohol abuse Neg Hx     Depression Neg Hx     Drug abuse Neg Hx     Substance Abuse Neg Hx     Mental illness Neg Hx      I have reviewed and agree with the history as documented.    E-Cigarette/Vaping    E-Cigarette Use Never User      E-Cigarette/Vaping Substances    Nicotine No     THC No     CBD No     Flavoring No     Other No     Unknown No      Social History     Tobacco Use    Smoking status: Never    Smokeless tobacco: Never   Vaping Use    Vaping status: Never Used   Substance Use Topics    Alcohol use: Never    Drug use: No        Review of Systems   Constitutional:  Negative for chills, fatigue and fever.   HENT:  Negative for congestion, ear pain, postnasal drip, rhinorrhea, sinus pain and sore throat.    Eyes:  Negative for pain and visual disturbance.   Respiratory:  Negative for cough, chest tightness and shortness of breath.    Cardiovascular:  Negative for chest pain and palpitations.   Gastrointestinal:  Negative for abdominal pain, constipation, diarrhea, nausea and vomiting.   Genitourinary:  Negative for difficulty urinating, dysuria and hematuria.   Musculoskeletal:  Negative for back pain.   Skin:  Negative for color change and rash.   Neurological:  Negative for dizziness, seizures, syncope, weakness, light-headedness, numbness and headaches.   All other systems reviewed and are negative.      Physical Exam  ED Triage Vitals   Temperature Pulse Respirations Blood Pressure SpO2   08/23/24 1441 08/23/24 1441 08/23/24 1441 08/23/24 1441 08/23/24 1441   98 °F (36.7 °C) 60 18 110/56 98 %      Temp Source Heart Rate Source Patient Position - Orthostatic VS BP  Location FiO2 (%)   08/23/24 1441 08/23/24 1441 08/23/24 1441 08/23/24 1441 --   Oral Monitor Sitting Right arm       Pain Score       08/23/24 1440       No Pain             Orthostatic Vital Signs  Vitals:    08/23/24 1441   BP: 110/56   Pulse: 60   Patient Position - Orthostatic VS: Sitting       Physical Exam  Constitutional:       General: He is not in acute distress.     Appearance: He is not diaphoretic.   HENT:      Head: Normocephalic and atraumatic.      Nose: No congestion or rhinorrhea.      Mouth/Throat:      Mouth: Mucous membranes are moist.      Pharynx: No oropharyngeal exudate.   Eyes:      General: No scleral icterus.  Cardiovascular:      Rate and Rhythm: Normal rate and regular rhythm.      Pulses: Normal pulses.      Heart sounds: Normal heart sounds. No murmur heard.     No friction rub. No gallop.      Comments: AV fistula on left arm, without bleeding  Pulmonary:      Effort: No respiratory distress.      Breath sounds: Normal breath sounds. No wheezing, rhonchi or rales.   Abdominal:      General: Abdomen is flat. There is no distension.      Palpations: Abdomen is soft.      Tenderness: There is no abdominal tenderness.   Musculoskeletal:      Right lower leg: No edema.      Left lower leg: No edema.   Lymphadenopathy:      Cervical: No cervical adenopathy.   Skin:     General: Skin is warm and dry.      Capillary Refill: Capillary refill takes less than 2 seconds.   Neurological:      General: No focal deficit present.      Mental Status: He is alert and oriented to person, place, and time.         ED Medications  Medications - No data to display    Diagnostic Studies  Results Reviewed       None                   No orders to display         Procedures  Procedures      ED Course  ED Course as of 08/23/24 1527   Fri Aug 23, 2024   1459 Patient seen and examined.  Fistula not bleeding at this time, only a few drops on dressing placed by EMS.  Wife states she is watching grandkid and needs  to get home and does not want patient to stay.  I have recommended observation for a few hours to see if bleeding recurs and if further intervention is necessary however wife declined and preferred to take patient home.  Strict return precautions discussed.  Dressing reapplied and supplies to rewrap given to wife should patient wear bandage off like he did today.  All questions answered, patient agreeable discharge home.                                       Medical Decision Making        Disposition  Final diagnoses:   Bleeding     Time reflects when diagnosis was documented in both MDM as applicable and the Disposition within this note       Time User Action Codes Description Comment    8/23/2024  2:54 PM Ernesto Spicer Add [R58] Bleeding           ED Disposition       ED Disposition   Discharge    Condition   Stable    Date/Time   Fri Aug 23, 2024  2:54 PM    Comment   Asad Galloway discharge to home/self care.                   Follow-up Information       Follow up With Specialties Details Why Contact Idris Anthony Rai, DO Internal Medicine In 1 week  [address] [place] [id number]  [phone]              Discharge Medication List as of 8/23/2024  2:55 PM        CONTINUE these medications which have NOT CHANGED    Details   apixaban (ELIQUIS) 5 mg Take 1 tablet (5 mg total) by mouth 2 (two) times a day, Starting Thu 8/15/2024, Normal      atorvastatin (LIPITOR) 40 mg tablet Take 1 tablet (40 mg total) by mouth daily with dinner, Starting Thu 8/8/2024, Normal      calcitriol (ROCALTROL) 0.5 MCG capsule Take 1 capsule (0.5 mcg total) by mouth 3 (three) times a week, Starting Wed 7/17/2024, Normal      cinacalcet (SENSIPAR) 60 MG tablet Take 2 tablets (120 mg total) by mouth 3 (three) times a week, Starting Wed 7/17/2024, Normal      divalproex sodium (DEPAKOTE SPRINKLE) 125 MG capsule Take 2 capsules (250 mg total) by mouth every 12 (twelve) hours, Starting Tue 7/23/2024, Normal       meclizine (ANTIVERT) 12.5 MG tablet Take 1 tablet (12.5 mg total) by mouth every 8 (eight) hours as needed for dizziness, Starting Sun 7/21/2024, Normal      melatonin 3 mg Take 1 tablet (3 mg total) by mouth daily at bedtime, Starting Sun 7/21/2024, Normal      metoprolol succinate (TOPROL-XL) 50 mg 24 hr tablet Take 1 tablet (50 mg total) by mouth 2 (two) times a day, Starting Tue 7/16/2024, Normal      MIDODRINE HCL PO Take 5 mg by mouth PRN w/ dialysis, Starting Fri 8/9/2024, Until Fri 8/8/2025 at 2359, Historical Med      prasugrel (EFFIENT) tablet Take 1 tablet (5 mg total) by mouth daily, Starting Fri 5/3/2024, Normal      sacubitril-valsartan (Entresto) 24-26 MG TABS Take 1 tablet by mouth in the morning, Starting Tue 7/16/2024, Normal      Sevelamer Carbonate 2.4 g PACK VIGOROUSLY MIX CONTENTS OF 1 PACKET IN WATER (IT WILL NOT DISSOLVE) AND DRINK 3 TIMES DAILY WITH MEALS, Historical Med      Vitamin D3 1.25 MG (95674 UT) capsule TAKE 1 CAPSULE BY MOUTH ONE TIME PER WEEK, Normal           No discharge procedures on file.    PDMP Review         Value Time User    PDMP Reviewed  Yes 12/26/2023  3:25 AM Awais Machado MD             ED Provider  Attending physically available and evaluated Asad Galloway. I managed the patient along with the ED Attending.    Electronically Signed by           Ernesto Spicer MD  08/23/24 [id number]

## 2024-08-26 ENCOUNTER — VBI (OUTPATIENT)
Dept: INTERNAL MEDICINE CLINIC | Facility: CLINIC | Age: 64
End: 2024-08-26

## 2024-08-26 NOTE — TELEPHONE ENCOUNTER
08/26/24 2:53 PM    Patient contacted post ED visit, [facility] department spoke with patient/caregiver and outreach was successful.    Thank you.  Edward Floyd MA  [facility]

## 2024-08-27 ENCOUNTER — OFFICE VISIT (OUTPATIENT)
Facility: CLINIC | Age: 64
End: 2024-08-27
Payer: MEDICARE

## 2024-08-27 ENCOUNTER — APPOINTMENT (OUTPATIENT)
Dept: PHYSICAL THERAPY | Facility: CLINIC | Age: 64
End: 2024-08-27
Payer: MEDICARE

## 2024-08-27 DIAGNOSIS — R42 DIZZINESS: Primary | ICD-10-CM

## 2024-08-27 DIAGNOSIS — Z86.73 HISTORY OF CVA (CEREBROVASCULAR ACCIDENT): ICD-10-CM

## 2024-08-27 DIAGNOSIS — R41.89 COGNITIVE DEFICITS: ICD-10-CM

## 2024-08-27 PROCEDURE — 97530 THERAPEUTIC ACTIVITIES: CPT

## 2024-08-27 NOTE — PROGRESS NOTES
"Daily Note     Today's date: 2024  Patient name: Asad Galloway  : 1960  MRN: [id number]  Referring provider: Beatriz Hung PA-C  Dx:   Encounter Diagnoses   Name Primary?    Dizziness Yes    History of CVA (cerebrovascular accident)     Cognitive deficits      Start Time: 1420  Stop Time: 1500  Total time in clinic (min): 40 minutes    PLAN OF CARE START: 24  PLAN OF CARE END: 24  FREQUENCY: 2-3x/week  PRECAUTIONS dialysis 3x/week, falls, terrified of dogs (do not let him see [employer] at all), primary language is Cuban but fluent in English    Subjective: Nothing new reported today    Objective: See treatment below.  TA:  -Yellow flex bar bends 3x15 upward and 3x15 downward to work on bilateral UE strength.   -Picking up 1\" foam blocks with hand helper +2 rubber bands with pt stating words from various categories directed by the therapist. Pt with 1 lb wrist weights donned bilaterally to work on UE endurance. Focus of activity on  strength, functional reach, attention. Pt repeated his answers consistently thru the session and required many cues to come up with words.   -Completed 1x matching game puzzle to work on visual discrimination, spatial relations, simple problem solving. Completed with min cues overall from the therapist.     Assessment: Pt tolerated session fairly. Limited muscular endurance observed during reaching exercise. Poor attention and recall observed thru the session - pt consistently repeating himself. Demonstrating significant difficulty with direction following, , sustained attn, sequencing, reasoning.    Plan: Continued skilled OT per POC.  Pt was recommended for PT evaluation - set up for 24. Recommendation for ST evaluation.     New Goals added :   Pt will demonstrate improved sustained attention as evidenced by completing TM part A within 1 minute with 0 verbal cues.   Pt will demonstrate improved FMC as evidenced by improving time on 9-hole peg " test by 8 seconds with each hand.

## 2024-08-29 ENCOUNTER — APPOINTMENT (OUTPATIENT)
Dept: PHYSICAL THERAPY | Facility: CLINIC | Age: 64
End: 2024-08-29
Payer: MEDICARE

## 2024-08-29 ENCOUNTER — OFFICE VISIT (OUTPATIENT)
Dept: CARDIOLOGY CLINIC | Facility: CLINIC | Age: 64
End: 2024-08-29
Payer: MEDICARE

## 2024-08-29 VITALS
DIASTOLIC BLOOD PRESSURE: 60 MMHG | OXYGEN SATURATION: 98 % | WEIGHT: 209 LBS | BODY MASS INDEX: 31.78 KG/M2 | SYSTOLIC BLOOD PRESSURE: 110 MMHG | HEART RATE: 79 BPM

## 2024-08-29 DIAGNOSIS — I35.0 NONRHEUMATIC AORTIC VALVE STENOSIS: ICD-10-CM

## 2024-08-29 DIAGNOSIS — Z95.5 STATUS POST INSERTION OF DRUG ELUTING CORONARY ARTERY STENT: ICD-10-CM

## 2024-08-29 DIAGNOSIS — Z76.82 PRE-KIDNEY TRANSPLANT, LISTED: ICD-10-CM

## 2024-08-29 DIAGNOSIS — Z86.718 HISTORY OF DVT (DEEP VEIN THROMBOSIS): ICD-10-CM

## 2024-08-29 DIAGNOSIS — Z79.02 ANTIPLATELET OR ANTITHROMBOTIC LONG-TERM USE: ICD-10-CM

## 2024-08-29 DIAGNOSIS — I50.22 CHRONIC SYSTOLIC HEART FAILURE (HCC): ICD-10-CM

## 2024-08-29 DIAGNOSIS — I25.5 ISCHEMIC CARDIOMYOPATHY: ICD-10-CM

## 2024-08-29 DIAGNOSIS — N18.6 ESRD ON DIALYSIS (HCC): ICD-10-CM

## 2024-08-29 DIAGNOSIS — Z99.2 ESRD ON DIALYSIS (HCC): ICD-10-CM

## 2024-08-29 DIAGNOSIS — I25.10 CAD, MULTIPLE VESSEL: Primary | ICD-10-CM

## 2024-08-29 DIAGNOSIS — E78.2 MIXED HYPERLIPIDEMIA: ICD-10-CM

## 2024-08-29 DIAGNOSIS — Z98.890 S/P ARTERIOVENOUS (AV) FISTULA CREATION: ICD-10-CM

## 2024-08-29 PROCEDURE — 99214 OFFICE O/P EST MOD 30 MIN: CPT | Performed by: INTERNAL MEDICINE

## 2024-08-29 PROCEDURE — G2211 COMPLEX E/M VISIT ADD ON: HCPCS | Performed by: INTERNAL MEDICINE

## 2024-08-29 NOTE — PROGRESS NOTES
[facility]    Name:Asad Galloway   DOS: 8/29/2024     Chief Complaint:   Chief Complaint   Patient presents with    Follow-up     3 month. No current cardiac complaints.       HISTORY OF PRESENT ILLNESS:      HPI:  Asad Galloway is a 64 y.o. male. He  has a past medical history of Cerebrovascular accident (CVA) due to thrombosis of left middle cerebral artery (HCC) (07/29/2018), Chronic kidney disease, Coronary artery disease, Diabetes mellitus (HCC), Dialysis patient (HCC), Fistula, GERD (gastroesophageal reflux disease), History of coronary artery stent placement, Hypercholesteremia, Hyperlipidemia, Hypertension, Infectious viral hepatitis, Limb alert care status, Neuropathy, Obesity, Osteomyelitis (HCC), PVC's (premature ventricular contractions), Stroke (HCC), TIA (transient ischemic attack) (10/28/2018), Wears dentures, and Wears glasses.    He presents for follow-up evaluation.  Doing very well overall with no active cardiopulmonary complaints. Tolerating HD well. Recently underwent a transthoracic echo and nuclear stress test [facility] at the request of his transplant surgery team there.  His most recent nuclear stress test is interpreted as abnormal. Images from that study are not available for my review.     I personally reviewed the patient's nuclear stress test from April 2022 performed in our network.  No prone imaging was performed.  The study demonstrates a moderate to severe intensity perfusion defect in the basal to mid inferolateral and inferior wall segments predominantly fixed.  There is mild reversibility to this defect, suggesting a small amount of mona-infarct ischemia in the affected territories.    Today, he reports that he is doing very well. He specifically denies chest pain, shortness of breath, diaphoresis, dizziness, palpitations, orthopnea, PND, edema, syncope.    Patient Summary for transfer of care:    Active issues:  Ischemic cardiomyopathy  Chronic HF with  mid-range EF (partially recovered to 45-50%) - Lowest EF 25% 1/30/2023.  Complex coronary artery disease - S/P multiple stents with history of in-stent stenosis and thrombosis on Plavix. Also with reported history of stent complications on Ticagrelor, although records are not available to support that. Has been maintained on Prasugrel despite history of CVA.   Moderate aortic stenosis  ESRD on HD - History of angina limiting dialysis sessions before his beta blocker was optimized.   History of DVT - Now chronically on Eliquis.   CVA    I first met Mr. Galloway in January 2023. He was admitted January 29, 2023. Prior to this, he had historically followed with a Cardiologist in [place] at [facility]. He was admitted in 1/2023 due to sudden onset of shortness of breath also with chest pressure/pain.  He was in acute respiratory failure requiring BiPAP in the ED and was eventually transferred to the ICU for management of pulmonary edema. He underwent volume removal with renal replacement therapy. He was also found to have high sensitivity troponins at that time. An echocardiogram at that time showed reduction in LVEF to 25 to 30%.  Last known EF prior to this was reportedly normal.  He was transferred to our [facility] to undergo cardiac catheterization on 2/1/23, and was found to have progression of coronary artery disease compared to cardiac catheterization in May 2022.  He had in-stent stenosis of the LAD, for which he received PCI and placement of a ADE. He was also found to have an occluded OM stent (placed 5/2022), which could not be wired thus unable to revascularize.  Also with occlusion of the RPAV branch. He was initiated on medical therapy for heart failure, and discharged with a LifeVest. Subsequent echocardiogram showed improvement in LVEF with echo in February 2023 at [facility] showing EF of 35 to 40%.  Repeat echo within our network on 4/24/2023 showed EF of 35%. He was referred  EP for ICD evaluation, but this was ultimately deferred due to high risk of infection in the setting of hemodialysis and possible immunosuppression when he gets renal transplant. Lower extremity osteomyelitis also raised concern for a device infection.    GDMT presently consists of Toprol XL 50mg BID, and Entresto 24-26mg once daily.     EKG 7/17/2024  Sinus rhythm with 1st degree A-V block  Right bundle branch block ( ms)    Cardiac Catheterization 2/1/23    1st Mrg lesion is 100% stenosed.    RPAV lesion is 100% stenosed.    Mid LAD lesion is 80% stenosed.    RPDA lesion is 60% stenosed.    The angioplasty was pre-stent angioplasty.    The angioplasty was pre-stent angioplasty.     Multivessel CAD with marked progression since the prior angiographic study in May 2022.  Stents placed in the mid LAD exhibited significant discrete and-stent restenosis.  The first OM branch, stented in 5/22, has become occluded.  The distal RCA beyond the PDA has akso become occluded.  LAD in--stent restenosis was interrogated by IVUS imaging and successfully treated with placement of a 4.0x13mm Orsiro ADE.  An attempt was made to wire the occluded OM branch, but the wire could not be advanced beyond the stent in mid vessel.     Nuclear Stress 4/14/2022    Stress ECG: A pharmacological stress test was performed using regadenoson. The patient experienced no angina during the test.    Stress ECG: The stress ECG is negative for ischemia after pharmacologic stress.    Perfusion: There is a left ventricular perfusion defect that is medium in size with severe reduction in uptake present in the basal to mid inferior and inferolateral location(s) consistent with prior scar.    Stress Function: Left ventricular function post-stress is normal. Post-stress ejection fraction is 46 %. There is a defect in the basal to mid inferior and inferolateral location(s).    Stress Combined Conclusion: Findings are consistent with inferolateral  infarction. There is mild reversibility to this defect, suggesting a small amount of mona-infarct ischemia in the affected territories.            ROS    ROS: Pertinent positives and negatives as described in History of Present Illness. Remainder of a 14 point review of systems was negative.     No Known Allergies     Current Outpatient Medications on File Prior to Visit   Medication Sig Dispense Refill    apixaban (ELIQUIS) 5 mg Take 1 tablet (5 mg total) by mouth 2 (two) times a day 60 tablet 3    atorvastatin (LIPITOR) 40 mg tablet Take 1 tablet (40 mg total) by mouth daily with dinner 30 tablet 5    calcitriol (ROCALTROL) 0.5 MCG capsule Take 1 capsule (0.5 mcg total) by mouth 3 (three) times a week 12 capsule 0    cinacalcet (SENSIPAR) 60 MG tablet Take 2 tablets (120 mg total) by mouth 3 (three) times a week 24 tablet 0    divalproex sodium (DEPAKOTE SPRINKLE) 125 MG capsule Take 2 capsules (250 mg total) by mouth every 12 (twelve) hours 360 capsule 1    metoprolol succinate (TOPROL-XL) 50 mg 24 hr tablet Take 1 tablet (50 mg total) by mouth 2 (two) times a day 60 tablet 0    MIDODRINE HCL PO Take 5 mg by mouth PRN w/ dialysis      prasugrel (EFFIENT) tablet Take 1 tablet (5 mg total) by mouth daily 90 tablet 1    sacubitril-valsartan (Entresto) 24-26 MG TABS Take 1 tablet by mouth in the morning 30 tablet 0    Sevelamer Carbonate 2.4 g PACK VIGOROUSLY MIX CONTENTS OF 1 PACKET IN WATER (IT WILL NOT DISSOLVE) AND DRINK 3 TIMES DAILY WITH MEALS      Vitamin D3 1.25 MG (22916 UT) capsule TAKE 1 CAPSULE BY MOUTH ONE TIME PER WEEK 12 capsule 4    meclizine (ANTIVERT) 12.5 MG tablet Take 1 tablet (12.5 mg total) by mouth every 8 (eight) hours as needed for dizziness 30 tablet 0    melatonin 3 mg Take 1 tablet (3 mg total) by mouth daily at bedtime 30 tablet 0     No current facility-administered medications on file prior to visit.       Past Medical History:   Diagnosis Date    Cerebrovascular accident (CVA) due to  thrombosis of left middle cerebral artery (HCC) 07/29/2018    Chronic kidney disease     Coronary artery disease     Diabetes mellitus (HCC)     not on meds    Dialysis patient (HCC)     M-W-F    Fistula     left upper arm for hemodialyis    GERD (gastroesophageal reflux disease)     History of coronary artery stent placement     x3    Hypercholesteremia     Hyperlipidemia     Hypertension     Infectious viral hepatitis     B as child    Limb alert care status     no BP/IV left arm    Neuropathy     Obesity     Osteomyelitis (HCC)     last assessed 11/4/16-per son not currently    PVC's (premature ventricular contractions)     sees [facility]    Stroke ([facility])     last weeof July 2018 [facility]    TIA (transient ischemic attack) 10/28/2018    Wears dentures     Wears glasses        Past Surgical History:   Procedure Laterality Date    ABDOMINAL SURGERY      CARDIAC CATHETERIZATION N/A 05/02/2022    Procedure: Cardiac Coronary Angiogram;  Surgeon: Sam Davis MD;  [facility];  Service: Cardiology    CARDIAC CATHETERIZATION N/A 05/02/2022    Procedure: Cardiac pci;  Surgeon: Sam Davis MD;  [facility];  Service: Cardiology    CARDIAC CATHETERIZATION  02/01/2023    Procedure: Cardiac catheterization;  Surgeon: Sam Davis MD;  [facility]: [facility];  Service: Cardiology    CARDIAC CATHETERIZATION N/A 02/01/2023    Procedure: Cardiac pci;  Surgeon: Sam Davis MD;  [facility];  Service: Cardiology    CARDIAC CATHETERIZATION N/A 02/01/2023    Procedure: Cardiac Coronary Angiogram;  Surgeon: Sam Davis MD;  [facility]: [facility];  Service: Cardiology    CARDIAC CATHETERIZATION N/A 02/01/2023    Procedure: Cardiac other-IVUS;  Surgeon: Sam Davis MD;  [facility];  Service: Cardiology    CHOLECYSTECTOMY      Percutaneous    COLONOSCOPY      CYSTOSCOPY      HEMODIALYSIS ADULT  1/22/2024    HEMODIALYSIS ADULT   "1/24/2024    IR LOWER EXTREMITY ANGIOGRAM  9/29/2023    IR LOWER EXTREMITY ANGIOGRAM  2/26/2024    OTHER SURGICAL HISTORY      \"stimulator to control bowel movements\"    SD ESOPHAGOGASTRODUODENOSCOPY TRANSORAL DIAGNOSTIC N/A 09/27/2016    Procedure: ESOPHAGOGASTRODUODENOSCOPY (EGD);  Surgeon: Adele Rowe MD;  Location: [facility] GI LAB;  Service: Gastroenterology    SD LAPAROSCOPY SURG CHOLECYSTECTOMY N/A 02/29/2016    Procedure: LAPAROSCOPIC CHOLECYSTECTOMY ;  Surgeon: Cliff Roman DO;  Location: [facility] Main [facility];  Service: General    SD SLCTV CATHJ 3RD+ ORD SLCTV ABDL PEL/LXTR BRNCH Left 9/29/2023    Procedure: Left leg angiogram with intervention;  Surgeon: Michelle Galvan MD;  Location: [facility];  Service: Vascular    SD SLCTV CATHJ 3RD+ ORD SLCTV ABDL PEL/LXTR BRNCH Left 2/26/2024    Procedure: Left leg angiogram antegrade access, left popliteal angioplasty;  Surgeon: Michelle Galvan MD;  Location: [facility];  Service: Vascular    ROTATOR CUFF REPAIR Right     SPINAL CORD STIMULATOR IMPLANT      \"Medtronic interstim model # 3058- in lower back to control bowel movements-currently turned off-battery is dead\"    TOE AMPUTATION Right 10/28/2016    Procedure: 3RD TOE AMPUTATION ;  Surgeon: Anjel Salas DPM;  Location: [facility];  Service:        Family History   Problem Relation Age of Onset    Leukemia Mother     Liver disease Mother     Lung cancer Mother         heavy smoker - 3 ppd    Heart disease Father     Liver disease Father     Diabetes type I Father     Multiple myeloma Sister     Breast cancer Sister     Urolithiasis Family     Alcohol abuse Neg Hx     Depression Neg Hx     Drug abuse Neg Hx     Substance Abuse Neg Hx     Mental illness Neg Hx        Social History     Socioeconomic History    Marital status: /Civil Union     Spouse name: Not on file    Number of children: Not on file    Years of education: Not on file    Highest education level: Not asked   Occupational History    " Occupation: disabled   Tobacco Use    Smoking status: Never    Smokeless tobacco: Never   Vaping Use    Vaping status: Never Used   Substance and Sexual Activity    Alcohol use: Never    Drug use: No    Sexual activity: Not Currently     Partners: Female     Comment: defer   Other Topics Concern    Not on file   Social History Narrative    Daily caffeine consumption 2-3 servings a day     Social Determinants of Health     Financial Resource Strain: Patient Declined (7/13/2023)    Overall Financial Resource Strain (CARDIA)     Difficulty of Paying Living Expenses: Patient declined   Food Insecurity: No Food Insecurity (7/18/2024)    Hunger Vital Sign     Worried About Running Out of Food in the Last Year: Never true     Ran Out of Food in the Last Year: Never true   Transportation Needs: No Transportation Needs (7/18/2024)    PRAPARE - Transportation     Lack of Transportation (Medical): No     Lack of Transportation (Non-Medical): No   Physical Activity: Not on file   Stress: No Stress Concern Present (1/18/2019)    [employer] - Occupational Stress Questionnaire     Feeling of Stress : Only a little   Social Connections: Unknown (1/18/2019)    Social Connection and Isolation Panel [NHANES]     Frequency of Communication with Friends and Family: Not on file     Frequency of Social Gatherings with Friends and Family: Not on file     Attends [employer] Services: Not on file     Active Member of Clubs or Organizations: Not on file     Attends Club or Organization Meetings: Not on file     Marital Status:    Intimate Partner Violence: Not At Risk (1/18/2019)    Humiliation, Afraid, Rape, and Kick questionnaire     Fear of Current or Ex-Partner: No     Emotionally Abused: No     Physically Abused: No     Sexually Abused: No   Housing Stability: Low Risk  (7/18/2024)    Housing Stability Vital Sign     Unable to Pay for Housing in the Last Year: No     Number of Times Moved in the Last  Year: 0     Homeless in the Last Year: No       OBJECTIVE:    /60 (BP Location: Right arm, Patient Position: Sitting, Cuff Size: Standard) Comment (BP Location): Fistula on Lt arm.  Pulse 79   Wt 94.8 kg (209 lb)   SpO2 98%   BMI 31.78 kg/m²      BP Readings from Last 3 Encounters:   08/29/24 110/60   08/23/24 110/56   08/15/24 106/62       Wt Readings from Last 3 Encounters:   08/29/24 94.8 kg (209 lb)   08/15/24 95.3 kg (210 lb)   07/23/24 94.3 kg (208 lb)         Physical Exam  Vitals reviewed.   Constitutional:       General: He is not in acute distress.     Appearance: Normal appearance. He is not diaphoretic.      Comments: Objectively appears improved compared to prior assessments.   HENT:      Head: Normocephalic and atraumatic.   Eyes:      Conjunctiva/sclera: Conjunctivae normal.   Neck:      Vascular: No carotid bruit or JVD.   Cardiovascular:      Rate and Rhythm: Normal rate and regular rhythm.      Pulses: Normal pulses.      Heart sounds: Normal heart sounds. No murmur heard.     No friction rub. No gallop.   Pulmonary:      Effort: Pulmonary effort is normal.      Breath sounds: Normal breath sounds. No wheezing, rhonchi or rales.   Abdominal:      General: Abdomen is flat. Bowel sounds are normal. There is no distension.      Palpations: Abdomen is soft.   Musculoskeletal:      Right lower leg: No edema.      Left lower leg: No edema.   Skin:     General: Skin is warm and dry.      Comments: LUE AV fistula. Swollen and ecchymotic. Non-tender.    Neurological:      General: No focal deficit present.      Mental Status: He is alert and oriented to person, place, and time.   Psychiatric:         Mood and Affect: Mood normal.         Behavior: Behavior normal.                                                         LABS:  Lab Results   Component Value Date    GLUCOSE 96 07/17/2024    BUN 40 (H) 07/19/2024    CREATININE 6.06 (H) 07/19/2024    CALCIUM 8.0 (L) 07/19/2024     08/10/2016    K  "4.7 07/19/2024    CO2 27 07/19/2024    CL 92 (L) 07/19/2024    ALKPHOS 99 07/15/2024    BILITOT 0.6 08/10/2016    PROT 6.7 08/10/2016    AST 6 (L) 07/15/2024    ALT 6 (L) 07/15/2024    ANIONGAP 9 01/03/2016        Lab Results   Component Value Date    WBC 4.95 07/19/2024    HGB 8.8 (L) 07/19/2024    HCT 27.3 (L) 07/19/2024    MCV 99 (H) 07/19/2024     (L) 07/19/2024       Lab Results   Component Value Date    CHOL 203 (H) 08/10/2016    HDL 30 (L) 01/30/2023    LDLCALC 21 01/30/2023    TRIG 123 01/30/2023       Lab Results   Component Value Date    HGBA1C 5.9 (H) 07/14/2024       ASSESSMENT/PLAN:  Diagnoses and all orders for this visit:    CAD, multiple vessel    Status post insertion of drug eluting coronary artery stent    Ischemic cardiomyopathy    Chronic systolic heart failure (HCC)    Nonrheumatic aortic valve stenosis    ESRD on dialysis ([facility])    S/P arteriovenous (AV) fistula creation    Pre-kidney transplant, listed    Mixed hyperlipidemia    Antiplatelet or antithrombotic long-term use    History of DVT (deep vein thrombosis)      Plan:  Continue Eliquis 5mg BID given history of DVT  Continue Prasugrel 5mg QD  Continue Toprol XL 50mg BID  Contine Entresto 24-26mg once daily  Continue Atorvastaitn 40mg QD  Midodrine as per his nephrologist.  Patient will follow up with vascular surgery re: his LUE fistula, which is presently bruised and swollen. His nephrologist is aware of this and managing as well.   Surveillance ECHO at a 1 year interval to re-assess his aortic valve.   Patient will be following with [facility] at the recommendation of his transplant team there.           Britney Tan MD      Portions of the record may have been created with voice recognition software. Occasional wrong word or \"sound alike\" substitutions may have occurred due to the inherent limitations of voice recognition software. Please review the chart carefully and recognize, using context, where " substitutions/typographical errors may have occurred.

## 2024-08-29 NOTE — PATIENT INSTRUCTIONS
You were seen today in the Cardiology office for follow up evaluation.     Please continue your current cardiac medications as prescribed.    Cardiologists that I recommend at [facility]:  Dr. Darci Birmingham      Thank you for choosing [facility].    Please call our office or use [place] with any questions.

## 2024-09-05 ENCOUNTER — OFFICE VISIT (OUTPATIENT)
Facility: CLINIC | Age: 64
End: 2024-09-05
Payer: MEDICARE

## 2024-09-05 ENCOUNTER — EVALUATION (OUTPATIENT)
Facility: CLINIC | Age: 64
End: 2024-09-05
Payer: MEDICARE

## 2024-09-05 DIAGNOSIS — R42 DIZZINESS: Primary | ICD-10-CM

## 2024-09-05 DIAGNOSIS — R53.81 PHYSICAL DECONDITIONING: ICD-10-CM

## 2024-09-05 DIAGNOSIS — R26.89 POOR BALANCE: ICD-10-CM

## 2024-09-05 DIAGNOSIS — R41.89 COGNITIVE DEFICITS: ICD-10-CM

## 2024-09-05 DIAGNOSIS — Z86.73 HISTORY OF CVA (CEREBROVASCULAR ACCIDENT): ICD-10-CM

## 2024-09-05 DIAGNOSIS — E11.42 DIABETIC POLYNEUROPATHY ASSOCIATED WITH TYPE 2 DIABETES MELLITUS (HCC): Primary | ICD-10-CM

## 2024-09-05 PROCEDURE — 97164 PT RE-EVAL EST PLAN CARE: CPT

## 2024-09-05 PROCEDURE — 97530 THERAPEUTIC ACTIVITIES: CPT

## 2024-09-05 NOTE — PROGRESS NOTES
Daily Note     Today's date: 2024  Patient name: Asad Galloway  : 1960  MRN: [id number]  Referring provider: Beatriz Hung PA-C  Dx:   Encounter Diagnoses   Name Primary?    Dizziness Yes    History of CVA (cerebrovascular accident)     Cognitive deficits      Start Time: 1503  Stop Time: 1545  Total time in clinic (min): 42 minutes    PLAN OF CARE START: 24  PLAN OF CARE END: 24  FREQUENCY: 2-3x/week  PRECAUTIONS dialysis 3x/week, falls, terrified of dogs (do not let him see [employer] at all), primary language is [place]i but fluent in English    Subjective: Nothing new reported today    Objective: See treatment below.  TA:  -Yellow flex bar bends 3x15 upward and 3x15 downward to work on bilateral UE strength.   -Completed 3x simple Qbitz puzzles to work on spatial relations, simple problem solving. Completed with min cues overall from the therapist.   -Completed activity with multimatrix - pt picking up number blocks and placing on top of letter in order. Activity focused on divided attention, hand strength, FMC.     Assessment: Pt tolerated session fairly. Pt showed improved attention today during multimatrix activity. Pt did better with  task of Qbitz, completing with min cues and fading assist from the therapist. Demonstrating significant difficulty with direction following, , sustained attn, sequencing, reasoning.    Plan: Continued skilled OT per POC.  Pt was recommended for PT evaluation - set up for 24. Recommendation for ST evaluation.     New Goals added :   Pt will demonstrate improved sustained attention as evidenced by completing TM part A within 1 minute with 0 verbal cues.   Pt will demonstrate improved FMC as evidenced by improving time on 9-hole peg test by 8 seconds with each hand.

## 2024-09-05 NOTE — PROGRESS NOTES
PT Re-Evaluation (transfer from different clinic)         POC expires Unit limit Auth Expiration date PT/OT + Visit Limit? Co-Insurance   24   [place] primary, 30pcy secondary Yes                                            Today's date: 2024  Patient name: Asad Galloway  : 1960  MRN: [id number]  Referring provider: No ref. provider found  Dx:   Encounter Diagnosis     ICD-10-CM    1. Diabetic polyneuropathy associated with type 2 diabetes mellitus (HCC)  E11.42       2. Poor balance  R26.89       3. Physical deconditioning  R53.81             Assessment  Assessment details: Patient is a 64 y.o. male who presents to skilled PT for impaired balance, general deconditioning with multiple comorbidiites including hx of CVA, CKD on HD. . Patient displays fair muscle strength with overall grade of 4-/5. Patient sensation is Normal throughout lower extremities. Patient coordination is Abnormal per alterate toe tapping and heel to shin test.   Patient balance scores are as follows: 30/56 BURGER, 17.3 seconds TUG without (AD), 10 Meter walk test 1.18 m/sec without (AD), with overall results noting high risk for falls per normative values associated with age and diagnosis. Patient endurance scores are as follows; 100 feet with  6 minute walk test with cane (only able to tolerate 1 minute and then requested to stop), 15.9 seconds with 5 x sit to stand test with overall results noting decrease functional endurance and cardio capacity. He rated 47.5% confidence with balance, indicating moderate level of functioning with fear of falling. Patient subjective report notes the following functional limitation with transfers, walking, ADLs. Patient will benefit from skilled PT to address noted impairments and functional limitations they are causing with overall goal to return patient to highest level possible with reduced risk for falls.       Please contact  me if you have any questions or recommendations. Thank you for the referral and the opportunity to share in Aasd Galloway's care.      Cut off score   All date taken from [facility] Neuro Section or Rehab Measures    BURGER test: 46/56                                              5 x STS Test:  MDC: 6 points                                                  MDC: 2.3 seconds   age norms                                                                 Age Norms   60-69 year old = M: 55, F: 55                        60-69 year old: 11.4 seconds   70-79 year old = M 54,  F: 53                       70-79 year old: 12.6 seconds     80-89 year old = M53,   F: 50                       80-89 year old: 14.8 seconds      TUG test:                                                                     10 Meter Walk Test:  MDC: 4.14 seconds       MDC: .59 ft/sec  Cut off score for Falls                                                  Age Norms  > 13.5 seconds community dwelling adults                20-29; M: 4.56 ft/sec F: 4.62 ft/sec  > 32.2 Frail Elderly                                                     30-39: M 4.76 ft/sec  F: 4.68 ft/sec          40-49: M: 4.79 ft/sec  F: 4.62 ft/sec  6 Minute Walk Test      50-59: M: 4.76 ft/sec  F: 4.56 ft/sec  MDC: 190 feet       60-69: M: 4.56 ft/sec  F: 4.26 ft/sec  Age Norms       70-+    M: 4.36 ft/sec  F: 4.16 ft/sec  60-69:    M: 1876 F: 1765  70-79:    M: 1729 F: 1545    FGA:  80-89 +: M: 1368 F; 1286       MCID: 4 points            Geriatrics/ Community Dwelling Older Adults: </= 22/30 fall risk            Geriatrics/ Community Dwelling Older Adults: </= 20/30 unexplained falls in the next 6 months            Parkinsons: </= 18/30 fall risk      Patient verbalized understanding of POC          Impairments: Abnormal coordination, Abnormal gait, Abnormal muscle tone, Abnormal or restricted ROM, Activity intolerance, Impaired balance, Impaired physical strength, Lacks appropriate HEP, Poor  posture, Poor body mechanics, Pain with function, Safety issue, Weight-bearing intolerance, Abnormal movement, Difficulty understanding, Abnormal muscle firing  Understanding of Dx/Px/POC: Excellent  Prognosis: Excellent    Patient verbalized understanding of POC.         Please contact me if you have any questions or recommendations. Thank you for the referral and the opportunity to share in Asad Galloway's care.        Plan  Plan details: complete testing, balance and functional strength/endurance re-training  Patient would benefit from: PT Eval and Skilled PT  Planned modality interventions: Biofeedback, Cryotherapy, TENS, Thermotherapy  Planned therapy interventions: Abdominal trunk stabilization, ADL training, Balance, Balance/WB training, Breathing training, Body mechanics training, Coordination, Functional ROM exercises, Gait training, HEP, Joint Mobilization, Manual Therapy, Griggs taping, Motor coordination training, Neuromuscular re-education, Patient education, Postural training, Strengthening, Stretching, Therapeutic activities, Therapeutic exercises, Therapeutic training, Transfer training, Activity modification, Work reintegration  Frequency: 2x/wk  Duration in weeks: 12  Plan of Care beginning date: 9/5/24  Plan of Care expiration date: 12 weeks - 11/28/2024  Treatment plan discussed with: Patient       Goals  Short Term Goals (4 weeks):    - Patient will improve time on TUG by 2.9 seconds to facilitate improved safety in all ambulation  - Patient will be independent in basic HEP 2-3 weeks  - Patient will improve 5xSTS score by 2.3 seconds to promote improved LE functional strength needed for ADLs      Long Term Goals (12 weeks):  - Patient will be independent in a comprehensive home exercise program  - Patient will improve scoring on DGI by 2.6 points to progress safety  - Patient will improve gait speed by 0.18 m/s to improve safety with community ambulation  - Patient will improve BURGER by 6  points in order to improve static balance and reduce risk for falls  - Patient will improve scoring on FGA by 4 points to progress safety with dynamic tasks  - Patient will be able to demonstrate HT in gait without veering  - Patient will improve 6 Minute Walk Test score by 190 feet to promote improved cardiovascular endurance  - Patient will report 50% reduction in near falls in order to improve safety with functional tasks and reduce his risk for falls  - Patient will report going on walks at least 3 days per week to promote independence and improved cardiovascular endurance  - Patient will be able to ascend/descend stairs reciprocally with 1 UE assist to promote independence and safety with ADLs  - Patient will report 50% reduction in near falls when ambulating on uneven terrain      Cut off score    All date taken from [facility] Neuro Section or Rehab Measures      Cabrera/56  MDC: 6 pts  Age Norms:  60-69: M - 55   F - 55  70-79: M - 54   F - 53  80-89: M - 53   F - 50 5xSTS: June et al 2010  MDC: 2.3 sec  Age Norms:  60-69: 11.4 sec  70-79: 12.6 sec  80-89: 14.8 sec   TUG  MDC: 4.14 sec  Cut off score:  >13.5 sec community dwelling adults  >32.2 frail elderly  <20 I for basic transfers  >30 dependent on transfers 10 Meter Walk Test: Joyce et al 2011  MDC: 0.18 m/s  20-29: M - 1.35 m   F - 1.34 m  30-39: M - 1.43 m   F - 1.34 m  40-49: M - 1.43 m   F - 1.39 m  50-59: M - 1.43 m   F - 1.31 m  60-69: M - 1.34 m   F - 1.24 m  70-79: M - 1.26 m   F - 1.13 m  80-89: M - 0.97 m   F - 0.94 m    Household Ambulator < 0.4 m/s  Limited Community Ambulator 0.4 - 0.8 m/s  Community Ambulator 0.8 - 1.2 m/s  Safely cross the street > 1.2 m/s   FGA  MCID: 4 pts  Geriatrics/community < 22/30 fall risk  Geriatrics/community < 20/30 unexplained falls    DGI  MDC: vestibular - 4 pts  MDC: geriatric/community - 3 pts  Falls risk <19/24 mCTSIB  Norm: 20-60 yrs  Eyes open firm: norm sway 0.21-0.48  Eyes closed firm: norm  sway 0.48-0.99  Eyes open foam: norm sway 0.38-0.71  Eyes closed foam: norm sway 0.70-2.22   6 Minute Walk Test  MDC: 190.98 ft  MCID: 164 ft    Age Norms  60-69: M - 1876 ft (571.80 m)  F - 1765 ft (537.98 m)  70-79: M - 1729 ft (527.00 m)  F - 1545 ft (470.92 m)  80-89: M - 1368 ft (416.97 m)  F - 1286 ft (391.97 m) ABC: Smith & Tre, 2003  <67% increased risk for falls   [place] Monofilaments  Evaluator Size:        Force (grams):          Hand/Dorsal Thresholds:        Plantar Thresholds:  - 1.65                       - 0.008                       - Normal                                 - Normal  - 2.36                       - 0.02                         - Normal                                 - Normal  - 2.44                       - 0.04                         - Normal                                 - Normal  - 2.83                       - 0.07                         - Normal                                 - Normal  - 3.22                       - 0.16                         - Diminished light touch          - Normal  - 3.61                       - 0.40                         - Diminished light touch          - Normal  - 3.84                       - 0.60                         - Diminished protective           - Diminished light touch  - 4.08                       - 1.00                         - Diminished protective           - Diminished light touch  - 4.17                       - 1.40                         - Diminished protective           - Diminished light touch  - 4.31                       - 2.00                         - Diminished protective           - Diminished light touch  - 4.56                       - 4.00                         - Loss of protective sense      - Diminished protective  - 4.74                       - 6.00                         - Loss of protective sense      - Diminished protective  - 4.93                       - 8.00                         - Loss of  protective sense      - Diminished protective  - 5.07                       - 10.0                         - Loss of protective sense     - Loss of protective sense  - 5.18                       - 15.0                         - Loss of protective sense     - Loss of protective sense  - 5.46                       - 26.0                         - Loss of protective sense     - Loss of protective sense  - 5.88                       - 60.0                         - Loss of protective sense     - Loss of protective sense  - 6.10                       - 100                          - Loss of protective sense     - Loss of protective sense  - 6.45                       - 180                          - Loss of protective sense     - Loss of protective sense  - 6.65                       - 300                          - Deep pressure sense only  - Deep pressure sense only         Subjective    History of Present Illness  - Mechanism of injury: Patient was being tx at [facility] OP location; transitioned to [place] for neuro/balance PT and neuro OT. He has CKD on HD M, W, Fx 5 years ; recent visit to ED 8/23 fistula bleeding but resolved. He  has a past medical history of Cerebrovascular accident (CVA) due to thrombosis of left middle cerebral artery (HCC) (07/29/2018), Chronic kidney disease, Coronary artery disease, Diabetes mellitus ([facility]), Dialysis patient ([facility]), Fistula, GERD (gastroesophageal reflux disease), History of coronary artery stent placement, Hypercholesteremia, Hyperlipidemia, Hypertension, Infectious viral hepatitis, Limb alert care status, Neuropathy, Obesity, Osteomyelitis ([facility]), PVC's (premature ventricular contractions), Stroke ([facility]), TIA (transient ischemic attack) (10/28/2018). He reports impaired balance and increased reliance on family for assist with ADLs; he wants to improve balance and independence    - Primary AD: cane intermittently   - Assist level at home: family assists him with ADLs  -  "Decreased fine motor tasks: No    Patient goal:  \"I want everything better \"     Pain  - Current pain ratin/10  - At best pain ratin/10  - At worst pain ratin/10  - Location: low back  - Aggravating factors: unsure     Social Support  - Steps to enter house: 3  - Stairs in house: yes but bedroom on 1st floor    - Lives in: multi story house   - Lives with: wife and 2 kids     - Employment status: retired   - Hand dominance: right    Treatments  - [facility] treatment[facility], [facility] to this clinic  - Current treatment: initiating PT eval/ tx ; also sees neuro OT        Objective     UE SCreen    LE MMT  - R Hip Flexion: 3+/5  L Hip Flexion: 4-/5  - R Hip Abduction: 4-/5  L Hip Abduction: 4-/5  - R Hip Adduction: 4-/5  L Hip Adduction: 4-/5  - R Knee Extension: 4-/5  L Knee Extension: 4-/5  - R Ankle DF: 4/5   L Ankle DF: 4/5  - R Ankle PF: 4-/5   L Ankle PF: 4/5    Sensation  - Light touch: intact       Coordination  - Heel to Shin: impaired B/L  - Alternate Toe Taps: impaired B/L          Postural Screen  - Observation: forward head        Gait  - Abnormalities: ambulates without AD on eval, demos narrow JOSE DAVID, inconsistent step length, lateral instability            Outcome Measures Initial Eval  24        5xSTS 15.85 sec, 2 UE         TUG  - Regular  - Cognitive   17.3 sec, no AD  24.3 sec, (serial 3s)        10 meter   1.18 m/s without AD        BURGER         FGA tba        DGI tba        mCTSIB  - FTEO (firm)  - FTEC (firm)  - FTEO (foam)  - FTEC (foam)   tba        6MWT 100 ft (attempted but only able to complete 1 minute)        ABC 47.5%                            Precautions: falls   Past Medical History:   Diagnosis Date    Cerebrovascular accident (CVA) due to thrombosis of left middle cerebral artery (HCC) 2018    Chronic kidney disease     Coronary artery disease     Diabetes mellitus (HCC)     not on meds    Dialysis patient (HCC)     M-W-F    Fistula     left upper arm " for hemodialyis    GERD (gastroesophageal reflux disease)     History of coronary artery stent placement     x3    Hypercholesteremia     Hyperlipidemia     Hypertension     Infectious viral hepatitis     B as child    Limb alert care status     no BP/IV left arm    Neuropathy     Obesity     Osteomyelitis (HCC)     last assessed 11/4/16-per son not currently    PVC's (premature ventricular contractions)     sees [facility]    Stroke (HCC)     last weeof July 2018 [facility]    TIA (transient ischemic attack) 10/28/2018    Wears dentures     Wears glasses

## 2024-09-07 ENCOUNTER — NURSE TRIAGE (OUTPATIENT)
Dept: OTHER | Facility: OTHER | Age: 64
End: 2024-09-07

## 2024-09-07 NOTE — TELEPHONE ENCOUNTER
"Reason for Disposition   [1] Systolic BP  AND [2] taking blood pressure medications AND [3] NOT dizzy, lightheaded or weak    Answer Assessment - Initial Assessment Questions  1. BLOOD PRESSURE: \"What is the blood pressure?\" \"Did you take at least two measurements 5 minutes apart?\"    62/40 was just measured on the phone and patient retaking.   Again at 96/40 with triager on phone        Patient reports the range has been :/ 35-55     2. ONSET: \"When did you take your blood pressure?\"      Last 15 minutes    3. HOW: \"How did you obtain the blood pressure?\" (e.g., visiting nurse, automatic home BP monitor)      Home BP monitor    4. HISTORY: \"Do you have a history of low blood pressure?\" \"What is your blood pressure normally?\"      Not typically but since starting eloquis with metoprolol, the dialysis nurses noted low pressures and told patient to ask to lower the metoprolol dose. Patient has shared this BP is now lower at home as well.       5. MEDICATIONS: \"Are you taking any medications for blood pressure?\" If yes: \"Have they been changed recently?\"      Metoprolol for the last year at 50mg 2x a day. Recently started eloquis and thinks this is effecting his BP      6. PULSE RATE: \"Do you know what your pulse rate is?\"       71    Protocols used: Low Blood Pressure-ADULT-  On call Provider paged. Provider recommended patient take half doses of 25mg bid. Patient can split the pill for now per Provider. Patient informed and verbalized understanding.   "

## 2024-09-07 NOTE — TELEPHONE ENCOUNTER
"Regarding: Low BP/ medication advice  ----- Message from Caity RIVERA sent at 9/7/2024  1:25 PM EDT -----  \"My father was prescribed metoprolol 50 mg 2x a day. He has been having issues with low BP since then. I was wondering if it could be lowered to 25mg 2x a day to help with the low BP\"    "

## 2024-09-08 DIAGNOSIS — E78.2 MIXED HYPERLIPIDEMIA: ICD-10-CM

## 2024-09-09 ENCOUNTER — TELEPHONE (OUTPATIENT)
Age: 64
End: 2024-09-09

## 2024-09-09 RX ORDER — ATORVASTATIN CALCIUM 40 MG/1
40 TABLET, FILM COATED ORAL
Qty: 90 TABLET | Refills: 1 | Status: SHIPPED | OUTPATIENT
Start: 2024-09-09

## 2024-09-09 NOTE — TELEPHONE ENCOUNTER
Received call from pt.  He states for the last 2 weeks his BP was dropping at dialysis and at home to 96/40.  He called over the weekend and spoke w/ on-call provider.  He was advised to reduce Metoprolol succinate 50 mg BID to Metoprolol succinate 25 mg BID.  Since this change, he states BP back to normal 96/60 and he is asymptomatic.  He is requesting 25 mg tablets to be sent to [facility], but wants to check with Dr. Tan first that this is okay.    He'd also like Dr. Tan to reach out to his [facility] doctor, Dr. Caitie Sarabia a cardiologist he will be seeing on Saturday as part of work-up for him being on the kidney transplant list.  Pt requesting Dr. Tan communicate his case to him.     Please advise.

## 2024-09-10 ENCOUNTER — OFFICE VISIT (OUTPATIENT)
Dept: PODIATRY | Facility: CLINIC | Age: 64
End: 2024-09-10
Payer: MEDICARE

## 2024-09-10 ENCOUNTER — OFFICE VISIT (OUTPATIENT)
Facility: CLINIC | Age: 64
End: 2024-09-10
Payer: MEDICARE

## 2024-09-10 VITALS
DIASTOLIC BLOOD PRESSURE: 62 MMHG | HEIGHT: 68 IN | WEIGHT: 209 LBS | SYSTOLIC BLOOD PRESSURE: 105 MMHG | BODY MASS INDEX: 31.67 KG/M2 | HEART RATE: 18 BPM

## 2024-09-10 DIAGNOSIS — R41.89 COGNITIVE DEFICITS: ICD-10-CM

## 2024-09-10 DIAGNOSIS — E11.42 DIABETIC POLYNEUROPATHY ASSOCIATED WITH TYPE 2 DIABETES MELLITUS (HCC): Primary | ICD-10-CM

## 2024-09-10 DIAGNOSIS — R42 DIZZINESS: ICD-10-CM

## 2024-09-10 DIAGNOSIS — I10 PRIMARY HYPERTENSION: Primary | ICD-10-CM

## 2024-09-10 DIAGNOSIS — I73.9 PERIPHERAL ARTERIAL DISEASE (HCC): ICD-10-CM

## 2024-09-10 DIAGNOSIS — Z86.73 HISTORY OF CVA (CEREBROVASCULAR ACCIDENT): Primary | ICD-10-CM

## 2024-09-10 DIAGNOSIS — Z89.421 ABSENCE OF TOE OF RIGHT FOOT (HCC): ICD-10-CM

## 2024-09-10 PROCEDURE — 97530 THERAPEUTIC ACTIVITIES: CPT

## 2024-09-10 PROCEDURE — 99213 OFFICE O/P EST LOW 20 MIN: CPT | Performed by: PODIATRIST

## 2024-09-10 RX ORDER — METOPROLOL SUCCINATE 25 MG/1
25 TABLET, EXTENDED RELEASE ORAL 2 TIMES DAILY
Qty: 30 TABLET | Refills: 6 | Status: SHIPPED | OUTPATIENT
Start: 2024-09-10

## 2024-09-10 NOTE — PROGRESS NOTES
Daily Note     Today's date: 9/10/2024  Patient name: Asad Galloway  : 1960  MRN: [id number]  Referring provider: Beatriz Hung PA-C  Dx:   Encounter Diagnoses   Name Primary?    History of CVA (cerebrovascular accident) Yes    Dizziness     Cognitive deficits      Start Time: 1420  Stop Time: 1500  Total time in clinic (min): 40 minutes    PLAN OF CARE START: 24  PLAN OF CARE END: 24  FREQUENCY: 2-3x/week  PRECAUTIONS dialysis 3x/week, falls, terrified of dogs (do not let him see Omega at all), primary language is Stateless but fluent in English    Subjective: Nothing new reported today    Objective: See treatment below.  TA:  -Yellow flex bar bends 3x15 upward and 3x15 downward to work on bilateral UE strength.   -Completed multiple color code puzzle to work on  skills, simple problem solving. Completed with min cues overall from the therapist.   -completed shape column exercise with pt picking up pieces with yellow clothes pin to work on visual attention, visual discrimination, hand strength.   -Completed blue shape matching board with pt picking up using yellow clothes pin to work on  skills, hand strength, attention.     Assessment: Pt tolerated session fairly. Pt appears to be doing better with visual attention. Demonstrating significant difficulty with direction following, , sustained attn, sequencing, reasoning.    Plan: Continued skilled OT per POC.    New Goals added :   Pt will demonstrate improved sustained attention as evidenced by completing TM part A within 1 minute with 0 verbal cues.   Pt will demonstrate improved FMC as evidenced by improving time on 9-hole peg test by 8 seconds with each hand.

## 2024-09-10 NOTE — PROGRESS NOTES
Patient ID: Asad Galloway is a 64 y.o. male Date of Birth 1960     Assessment:    No problem-specific Assessment & Plan notes found for this encounter.       Diagnoses and all orders for this visit:    Diabetic polyneuropathy associated with type 2 diabetes mellitus (HCC)    Peripheral arterial disease (HCC)    Absence of toe of right foot (HCC)          Procedures    Plan:  Reviewed medical records.  Discussed his condition and prognosis.     Educated disease prevention and risks related to diabetes.  Educated proper daily foot care and exam.  Instructed proper skin care / protection and footwear.  Instructed to identify any signs of infection and related foot problem.   Nails / HPK debrided.    The patient will return in 10 weeks for diabetic foot exam.         Subjective:          HPI    The patient presents for diabetic foot exam.  He has high risk diabetic foot with history of partial toe amp, PAD, and left heel ulcer.  No recurring ulcer.  No foot pain.  BS under control.        The following portions of the patient's history were reviewed and updated as appropriate: allergies, current medications, past family history, past medical history, past social history, past surgical history, and problem list.      PAST MEDICAL HISTORY:  Past Medical History:   Diagnosis Date    Cerebrovascular accident (CVA) due to thrombosis of left middle cerebral artery (HCC) 07/29/2018    Chronic kidney disease     Coronary artery disease     Diabetes mellitus (HCC)     not on meds    Dialysis patient (HCC)     M-W-F    Fistula     left upper arm for hemodialyis    GERD (gastroesophageal reflux disease)     History of coronary artery stent placement     x3    Hypercholesteremia     Hyperlipidemia     Hypertension     Infectious viral hepatitis     B as child    Limb alert care status     no BP/IV left arm    Neuropathy     Obesity     Osteomyelitis (HCC)     last assessed 11/4/16-per son not currently    PVC's (premature  "ventricular contractions)     sees  Cardio    Stroke (HCC)     last weeof July 2018 [facility]    TIA (transient ischemic attack) 10/28/2018    Wears dentures     Wears glasses        PAST SURGICAL HISTORY:  Past Surgical History:   Procedure Laterality Date    ABDOMINAL SURGERY      CARDIAC CATHETERIZATION N/A 05/02/2022    Procedure: Cardiac Coronary Angiogram;  Surgeon: Sam Davis MD;  [facility];  Service: Cardiology    CARDIAC CATHETERIZATION N/A 05/02/2022    Procedure: Cardiac pci;  Surgeon: Sam Davis MD;  [facility];  Service: Cardiology    CARDIAC CATHETERIZATION  02/01/2023    Procedure: Cardiac catheterization;  Surgeon: Sam Davis MD;  [facility];  Service: Cardiology    CARDIAC CATHETERIZATION N/A 02/01/2023    Procedure: Cardiac pci;  Surgeon: Sam Davis MD;  [facility];  Service: Cardiology    CARDIAC CATHETERIZATION N/A 02/01/2023    Procedure: Cardiac Coronary Angiogram;  Surgeon: Sam Davis MD;  [facility];  Service: Cardiology    CARDIAC CATHETERIZATION N/A 02/01/2023    Procedure: Cardiac other-IVUS;  Surgeon: Sam Davis MD;  [facility];  Service: Cardiology    CHOLECYSTECTOMY      Percutaneous    COLONOSCOPY      CYSTOSCOPY      HEMODIALYSIS ADULT  1/22/2024    HEMODIALYSIS ADULT  1/24/2024    IR LOWER EXTREMITY ANGIOGRAM  9/29/2023    IR LOWER EXTREMITY ANGIOGRAM  2/26/2024    OTHER SURGICAL HISTORY      \"stimulator to control bowel movements\"    NE ESOPHAGOGASTRODUODENOSCOPY TRANSORAL DIAGNOSTIC N/A 09/27/2016    Procedure: ESOPHAGOGASTRODUODENOSCOPY (EGD);  Surgeon: Adele Rowe MD;  Location: [facility];  Service: Gastroenterology    NE LAPAROSCOPY SURG CHOLECYSTECTOMY N/A 02/29/2016    Procedure: LAPAROSCOPIC CHOLECYSTECTOMY ;  Surgeon: Cliff Roman DO;  Location: [facility];  Service: General    NE SLCTV CATHJ 3RD+ ORD SLCTV ABDL PEL/LXTR BRNCH " "Left 9/29/2023    Procedure: Left leg angiogram with intervention;  Surgeon: Michelle Galvan MD;  Location: [facility];  Service: Vascular    OH SLCTV CATHJ 3RD+ ORD SLCTV ABDL PEL/LXTR BRNCH Left 2/26/2024    Procedure: Left leg angiogram antegrade access, left popliteal angioplasty;  Surgeon: Michelle Galvan MD;  [facility];  Service: Vascular    ROTATOR CUFF REPAIR Right     SPINAL CORD STIMULATOR IMPLANT      \"Medtronic interstim model # 3058- in lower back to control bowel movements-currently turned off-battery is dead\"    TOE AMPUTATION Right 10/28/2016    Procedure: 3RD TOE AMPUTATION ;  Surgeon: Anjel Salas DPM;  [facility];  Service:         ALLERGIES:  Patient has no known allergies.    MEDICATIONS:  Current Outpatient Medications   Medication Sig Dispense Refill    apixaban (ELIQUIS) 5 mg Take 1 tablet (5 mg total) by mouth 2 (two) times a day 60 tablet 3    atorvastatin (LIPITOR) 40 mg tablet TAKE 1 TABLET BY MOUTH DAILY WITH DINNER 90 tablet 1    calcitriol (ROCALTROL) 0.5 MCG capsule Take 1 capsule (0.5 mcg total) by mouth 3 (three) times a week 12 capsule 0    cinacalcet (SENSIPAR) 60 MG tablet Take 2 tablets (120 mg total) by mouth 3 (three) times a week 24 tablet 0    divalproex sodium (DEPAKOTE SPRINKLE) 125 MG capsule Take 2 capsules (250 mg total) by mouth every 12 (twelve) hours 360 capsule 1    metoprolol succinate (TOPROL-XL) 25 mg 24 hr tablet Take 1 tablet (25 mg total) by mouth 2 (two) times a day 30 tablet 6    MIDODRINE HCL PO Take 5 mg by mouth PRN w/ dialysis      prasugrel (EFFIENT) tablet Take 1 tablet (5 mg total) by mouth daily 90 tablet 1    sacubitril-valsartan (Entresto) 24-26 MG TABS Take 1 tablet by mouth in the morning 30 tablet 0    Sevelamer Carbonate 2.4 g PACK VIGOROUSLY MIX CONTENTS OF 1 PACKET IN WATER (IT WILL NOT DISSOLVE) AND DRINK 3 TIMES DAILY WITH MEALS      Vitamin D3 1.25 MG (23420 UT) capsule TAKE 1 CAPSULE BY MOUTH ONE " TIME PER WEEK 12 capsule 4    meclizine (ANTIVERT) 12.5 MG tablet Take 1 tablet (12.5 mg total) by mouth every 8 (eight) hours as needed for dizziness 30 tablet 0    melatonin 3 mg Take 1 tablet (3 mg total) by mouth daily at bedtime 30 tablet 0     No current facility-administered medications for this visit.       SOCIAL HISTORY:  Social History     Socioeconomic History    Marital status: /Civil Union     Spouse name: None    Number of children: None    Years of education: None    Highest education level: Not asked   Occupational History    Occupation: disabled   Tobacco Use    Smoking status: Never    Smokeless tobacco: Never   Vaping Use    Vaping status: Never Used   Substance and Sexual Activity    Alcohol use: Never    Drug use: No    Sexual activity: Not Currently     Partners: Female     Comment: defer   Other Topics Concern    None   Social History Narrative    Daily caffeine consumption 2-3 servings a day     Social Determinants of Health     Financial Resource Strain: Patient Declined (7/13/2023)    Overall Financial Resource Strain (CARDIA)     Difficulty of Paying Living Expenses: Patient declined   Food Insecurity: No Food Insecurity (7/18/2024)    Hunger Vital Sign     Worried About Running Out of Food in the Last Year: Never true     Ran Out of Food in the Last Year: Never true   Transportation Needs: No Transportation Needs (7/18/2024)    PRAPARE - Transportation     Lack of Transportation (Medical): No     Lack of Transportation (Non-Medical): No   Physical Activity: Not on file   Stress: No Stress Concern Present (1/18/2019)    [place] [employer] - Occupational Stress Questionnaire     Feeling of Stress : Only a little   Social Connections: Unknown (1/18/2019)    Social Connection and Isolation Panel [NHANES]     Frequency of Communication with Friends and Family: Not on file     Frequency of Social Gatherings with Friends and Family: Not on file     Attends Bahai  "Services: Not on file     Active Member of Clubs or Organizations: Not on file     Attends Club or Organization Meetings: Not on file     Marital Status:    Intimate Partner Violence: Not At Risk (1/18/2019)    Humiliation, Afraid, Rape, and Kick questionnaire     Fear of Current or Ex-Partner: No     Emotionally Abused: No     Physically Abused: No     Sexually Abused: No   Housing Stability: Low Risk  (7/18/2024)    Housing Stability Vital Sign     Unable to Pay for Housing in the Last Year: No     Number of Times Moved in the Last Year: 0     Homeless in the Last Year: No        Review of Systems   Constitutional:  Negative for chills and fever.   Respiratory:  Negative for cough and shortness of breath.    Cardiovascular:  Negative for chest pain.   Gastrointestinal:  Negative for nausea and vomiting.   Neurological:  Positive for numbness. Negative for weakness.         Objective:              /62   Pulse (!) 18   Ht 5' 8\" (1.727 m)   Wt 94.8 kg (209 lb)   BMI 31.78 kg/m²     Physical Exam  Vitals and nursing note reviewed.   Constitutional:       General: He is not in acute distress.     Appearance: He is not toxic-appearing or diaphoretic.   Cardiovascular:      Rate and Rhythm: Normal rate and regular rhythm.      Pulses:           Dorsalis pedis pulses are 1+ on the right side and 1+ on the left side.        Posterior tibial pulses are 0 on the right side and 0 on the left side.   Pulmonary:      Effort: Pulmonary effort is normal. No respiratory distress.   Musculoskeletal:         General: Deformity present. No signs of injury.      Right lower leg: Edema present.      Left lower leg: Edema present.      Right foot: No Charcot foot or foot drop.      Left foot: No Charcot foot or foot drop.      Comments: S/P partial amp right 3rd toe.   Feet:      Comments: Biphasic PTA left.  Skin:     General: Skin is warm and dry.      Capillary Refill: Capillary refill takes less than 2 seconds.      " Coloration: Skin is not cyanotic or mottled.      Findings: No abscess or erythema.      Nails: There is no clubbing.      Comments: No wounds in his feet.  Keratosis noted left heel.  No signs of infection.  Thick, mycotic nails X 7 with onycholysis and discoloration.   Neurological:      General: No focal deficit present.      Mental Status: He is alert and oriented to person, place, and time.      Cranial Nerves: No cranial nerve deficit.      Sensory: Sensory deficit present.      Motor: No weakness.      Coordination: Coordination normal.   Psychiatric:         Mood and Affect: Mood normal.         Behavior: Behavior normal.         Thought Content: Thought content normal.         Judgment: Judgment normal.

## 2024-09-10 NOTE — TELEPHONE ENCOUNTER
Called patient & read him Lobo Aldana's orders & instructions.    Patient verbalized understanding.

## 2024-09-12 ENCOUNTER — OFFICE VISIT (OUTPATIENT)
Facility: CLINIC | Age: 64
End: 2024-09-12
Payer: MEDICARE

## 2024-09-12 ENCOUNTER — EVALUATION (OUTPATIENT)
Facility: CLINIC | Age: 64
End: 2024-09-12
Payer: MEDICARE

## 2024-09-12 DIAGNOSIS — E11.42 DIABETIC POLYNEUROPATHY ASSOCIATED WITH TYPE 2 DIABETES MELLITUS (HCC): Primary | ICD-10-CM

## 2024-09-12 DIAGNOSIS — Z86.73 HISTORY OF CVA (CEREBROVASCULAR ACCIDENT): Primary | ICD-10-CM

## 2024-09-12 DIAGNOSIS — R41.89 COGNITIVE DEFICITS: ICD-10-CM

## 2024-09-12 DIAGNOSIS — R26.89 POOR BALANCE: ICD-10-CM

## 2024-09-12 DIAGNOSIS — R42 DIZZINESS: ICD-10-CM

## 2024-09-12 PROCEDURE — 97530 THERAPEUTIC ACTIVITIES: CPT

## 2024-09-12 PROCEDURE — 97112 NEUROMUSCULAR REEDUCATION: CPT

## 2024-09-12 NOTE — PROGRESS NOTES
Daily Note     Today's date: 2024  Patient name: Asad Galloway  : 1960  MRN: [id number]  Referring provider: Raj Auguste DO   Dx:   Encounter Diagnosis     ICD-10-CM    1. Diabetic polyneuropathy associated with type 2 diabetes mellitus (HCC)  E11.42       2. Poor balance  R26.89         POC expires Unit limit Auth Expiration date PT/OT + Visit Limit? Co-Insurance   24   [facility] primary, 30pcy secondary Yes                                     Subjective: Patient requests to not be around the facility dog.       Objective: See treatment diary below  TA:  - mCTSIB  - Education on fistula     NMR:   - 5 x STS: 10 x  - Step taps to large RR: 15 x  - Stair negotiation with 1-2 UE support: 2 min  - Side steppin ft, 4 laps   - Fwd stepping over 6 in alexander: 10x   - Lat stepping over flat alexander: 10x (due to hip pain)  - Ambulation with 5.5# TT: 50 ft    Assessment: Tolerated treatment well with a focus on completing outcome measures and initiating balance program. Patient illustrated inability to stand on foam pad on 3 and 4th conditions of the mCTSIB, suggesting visual reliance for balance. Patient with most difficulty with side stepping as noted by several episodes of M/L instability, likely due to decreased proprioceptive input. Requested to hold therapist's hand throughout therapy secondary to fear of falling. Educated patient on reporting to the hospital if his fistula pain gets worse and he was in understanding. Communicated this with OT whom will speak with son when he picks him up. Patient would benefit from continued PT      Plan: Continue per plan of care.          Outcome Measures Initial Eval + DN  24 & 24        5xSTS 15.85 sec, 2 UE         TUG  - Regular  - Cognitive   17.3 sec, no AD  24.3 sec, (serial 3s)        10 meter   1.18 m/s without AD        BURGER         mCTSIB  - FTEO (firm)  - FTEC (firm)  - FTEO (foam)  - FTEC (foam)   30 sec +  30 ++  2 sec  0 sec        6MWT  100 ft (attempted but only able to complete 1 minute)        ABC 47.5%

## 2024-09-12 NOTE — PROGRESS NOTES
OCCUPATIONAL THERAPY RE-EVALUATION    Today's Date: 2024  Patient Name: Asad Galloway  : 1960  MRN: [id number]  Referring Provider: Beatriz Hung PA-C  Dx: History of CVA (cerebrovascular accident) [Z86.73]    SKILLED ANALYSIS:  Pt is a right hand dominant 64 year old male presenting to OP OT s/p recent fall and decrease in functional status. Pt has a history of CVA from 2018. Per the patients wife, the pt has required assistance with ADLs and IADLs since his CVA in 2018, however has had a significant decline since his recent fall resulting in a hospitalization.  Pt continues to require assistance with dressing, bathing, bathroom management, and all IADLs. Over the past month pt made progress with his functional cognition as measured via the MoCA today (5 point improvement) however had no significant changes with his  or pinch strength. Pt achieved his goals for functional cognition based on the MoCA assessment today. Pt is demonstrating deficits in the following domains: EF skills, attention, recall,  skills, standing balance, functional activity tolerance, UE strength, hand strength.  Deficits are noted based on the following assessments: MoCA, TM part A, ROM, dynamometer, pinch gauge, and clinical observation. These deficits are impairing the patient ability to perform ADLs and IADLs to a level he was at before his most recent fall. Recommend OP OT 2-3x/wk for 8-12 weeks with focus on aforementioned deficits to maximize functional recovery, improve QOL, and reduce caregiver burden.  Findings and recommendations discussed with pt, and they are in agreement.    Educated pt on charges of insurance, acknowledge understanding, and are in agreement.    PLEASE COMPLETE TM PART A, 9-HOLE, FDT, MMT NEXT SESSION    PLAN OF CARE START: 24  PLAN OF CARE END: 24  FREQUENCY: 2-3x/week  PRECAUTIONS dialysis 3x/week.     Subjective    Occupational Profile  Pt lives with at home with his wife and  "two kids (23 and 28), other two kids are  and live on their own. Pt lives in a bi-level home. There are 5 steps down and 7 steps up. Pt resides downstairs. They have a shower bench, grab bars, handles on toilet, and railing to assist with getting into and out of bed. Pt is a retired , he has been retired since his stroke. Pt is not driving. Pt wife reports she provides ~80% assistance for all ADLs. Pt wife reports she does all IADLs. Pt was not independent before his recent fall, however he is significantly more dependent at this point.     PATIENT GOAL: \"To get better, to get stronger\"    HISTORY OF PRESENT ILLNESS:   Asad Galloway is a 64 y.o. male patient who originally presented to the hospital on 7/17/2024 due to ambulatory dysfunction after fall.  Patient was admitted to [facility] was discharged with a diagnosis of acute DVT on 07/16.  Patient presented to the emergency department on 07/17 after a mechanical fall at home.  Fall was witnessed.  Patient did not syncopized.  Patient's trauma evaluation was negative in the emergency department.  Patient was noted to have profound weakness with attempts to lift himself off of the toilet.  PT/OT recommended short-term rehab.  Today, wife at bedside is declining short-term rehab.  Family would like to be discharged with outpatient physical therapy/Occupational Therapy.      PMH:   Past Medical History:   Diagnosis Date    Cerebrovascular accident (CVA) due to thrombosis of left middle cerebral artery (HCC) 07/29/2018    Chronic kidney disease     Coronary artery disease     Diabetes mellitus (HCC)     not on meds    Dialysis patient (HCC)     M-W-F    Fistula     left upper arm for hemodialyis    GERD (gastroesophageal reflux disease)     History of coronary artery stent placement     x3    Hypercholesteremia     Hyperlipidemia     Hypertension     Infectious viral hepatitis     B as child    Limb alert care status     no BP/IV left arm " "   Neuropathy     Obesity     Osteomyelitis (HCC)     last assessed 11/4/16-per son not currently    PVC's (premature ventricular contractions)     sees  Cardio    Stroke (HCC)     last weeof July 2018 [facility]    TIA (transient ischemic attack) 10/28/2018    Wears dentures     Wears glasses        Past Surgical Hx:   Past Surgical History:   Procedure Laterality Date    ABDOMINAL SURGERY      CARDIAC CATHETERIZATION N/A 05/02/2022    Procedure: Cardiac Coronary Angiogram;  Surgeon: Sam Davis MD;  [facility];  Service: Cardiology    CARDIAC CATHETERIZATION N/A 05/02/2022    Procedure: Cardiac pci;  Surgeon: Sam Davis MD;  [facility];  Service: Cardiology    CARDIAC CATHETERIZATION  02/01/2023    Procedure: Cardiac catheterization;  Surgeon: Sam Davis MD;  [facility];  Service: Cardiology    CARDIAC CATHETERIZATION N/A 02/01/2023    Procedure: Cardiac pci;  Surgeon: Sam Davis MD;  [facility];  Service: Cardiology    CARDIAC CATHETERIZATION N/A 02/01/2023    Procedure: Cardiac Coronary Angiogram;  Surgeon: Sam Davis MD;  [facility];  Service: Cardiology    CARDIAC CATHETERIZATION N/A 02/01/2023    Procedure: Cardiac other-IVUS;  Surgeon: Sam Davis MD;  [facility];  Service: Cardiology    CHOLECYSTECTOMY      Percutaneous    COLONOSCOPY      CYSTOSCOPY      HEMODIALYSIS ADULT  1/22/2024    HEMODIALYSIS ADULT  1/24/2024    IR LOWER EXTREMITY ANGIOGRAM  9/29/2023    IR LOWER EXTREMITY ANGIOGRAM  2/26/2024    OTHER SURGICAL HISTORY      \"stimulator to control bowel movements\"    HI ESOPHAGOGASTRODUODENOSCOPY TRANSORAL DIAGNOSTIC N/A 09/27/2016    Procedure: ESOPHAGOGASTRODUODENOSCOPY (EGD);  Surgeon: Adele Rowe MD;  Location: [facility] GI [facility];  Service: Gastroenterology    HI LAPAROSCOPY SURG CHOLECYSTECTOMY N/A 02/29/2016    Procedure: LAPAROSCOPIC CHOLECYSTECTOMY ;  Surgeon: " "Cliff Roman DO;  Location: [facility];  Service: General    WI SLCTV CATHJ 3RD+ ORD SLCTV ABDL PEL/LXTR BRNCH Left 2023    Procedure: Left leg angiogram with intervention;  Surgeon: Michelle Galvan MD;  Location: [facility];  Service: Vascular    WI SLCTV CATHJ 3RD+ ORD SLCTV ABDL PEL/LXTR BRNCH Left 2024    Procedure: Left leg angiogram antegrade access, left popliteal angioplasty;  Surgeon: Michelle Galvan MD;  Location: [facility];  Service: Vascular    ROTATOR CUFF REPAIR Right     SPINAL CORD STIMULATOR IMPLANT      \"Medtronic interstim model # 3058- in lower back to control bowel movements-currently turned off-battery is dead\"    TOE AMPUTATION Right 10/28/2016    Procedure: 3RD TOE AMPUTATION ;  Surgeon: Anjel Salas DPM;  Location: [facility];  Service:         Pain:  Location:     Restin    Sitting too lon/10 in the L leg. This resolves after walking around     Objective    Upper Extremities:  Pt is right hand dominant                MARIFER: RUE: 50lb LUE: 20lb -  no change  The age norm is approximately 76-89 lbs, indicating decreased  strength.                2 point pinch: RUE: 10, LUE: 1 (Prior: )  3 point pinch: RUE: 8, LUE: 1 (Prior: )  Lateral pinch: RUE: 10, LUE: 5 (Prior: )                Range of Motion:  AROM:   R UE   - Shoulder flexion: 93  - Shoulder scaption/abduction:101  - Shoulder extension:   - Shoulder internal rotation: WNL  - Shoulder external rotation:32  - Elbow flexion: WNL  - Elbow extension: WNL  - Wrist flexion:WNL  - Wrist extension: WNL  - Pronation: WNL  - Supination: WNL  - Radial deviation: WNL  - Ulnar deviation: WNL  - Finger extension: WNL   - Finger flexion: WNL    L UE: WFL     Subluxation: none    Scapular winging: none    Spasticity: none     Finger to Nose Testing with Visual Occlusion (proprioception):  WNL    Finger to Nose Testing without Visual Occlusion: WNL     Monofilaments/sensory testing: " WNL    Functional Cognition:  Highest level of education: High school    Víctor Cognitive Assessment Version 8.2 (MoCA V8.2)  Visuospatial/executive functionin/5  Namin/3  Memory: 1st trial:  0/5, 2nd trial:  3/5  Attention/concentration: 2/2  List of letters: 1  Serial Seven Subtraction:  2/3 w/ 3 errors  Language/sentence repetition:  1/2  Language Fluency:  0/1  Abstract/Correlational Thinkin/2  Delayed Recall:  0/5  Orientation:  3/6   Memory Index Score: 4/15  1 point added for education level   Raw Score:  , MIS:  2/15, indicative of MODERATE neurocognitive impairments.    MoCA Scoring        Normal: 26+         Mild Cognitive Impairment: 18-25          Moderate Cognitive Impairment: 10-17         Severe Cognitive Impairment: <10      NINE-HOLE PEG TEST: assesses dexterity/fine motor coordination. Alternative scoring: the number of pegs placed in 50 or 100 seconds can be recorded.     R hand: 138 secodns (Prior: 112 seconds)   L hand: 75 seconds  - Pt did not complete today.      Norms:   Sex Age Average R Average L   Male 61-65 20.87 21.60      Pt demonstrates decreased FMC compared to norms for age/sex         Trail making Part A and Part B:   Part A: 2:19 with 6 Vcs (IE: 1:45 with 4  VCs for recall of instructions, problem solving)  Indicating deficits: Part A deficit > 33.22 seconds for age related norms    Following re-evaluation pt was provided with a hand strengthening HEP and provided with a blue foam sponge to work on hand strengthening at home. Pt was provided with a handout. Pt's son was also educated on the pt's new HEP.     GOALS:   Short Term Goals:  Pt will increase B UE  strength by 4 lbs to improve performance with ADLs NO CHANGE  Pt will increase R lateral pinch strength by 2 lb to improve performance with ADLs/dressing NO CHANGE  Pt will demonstrate improved functional cognition as evidenced by improving score on the MoCA to 10/30 to improve performance during  ADLs and IADLs ACHIEVED 9/12  Pt will demonstrate improved sustained attention as evidenced by completing TM part A within 1 minute with 0 verbal cues. DECLINED  Pt will demonstrate improved FMC as evidenced by improving time on 9-hole peg test by 8 seconds with each hand. DECLINED    Long Term Goals: 8-12 weeks  Pt will increase B UE  strength by 7 lbs to improve performance with ADLs   Pt will increase R lateral pinch strength by 3 lb to improve performance with ADLs/dressing  Pt will demonstrate improved functional cognition as evidenced by improving score on the MoCA to 12/30 to improve performance during ADLs and IADLs ACHIEVED 9/12  Pt will improve functional activity tolerance and dressing to complete with no more than mod(A) from his wife per report.   Pt will improve functional activity tolerance and bathroom skills to complete with no more than mod(A) from his wife per report.     OTHER PLANNED THERAPY INTERVENTIONS:   Supine, seated, and in stance neuro re-ed  Tricep AG  NMES/FES  FMC/prehension  Timed Trials  Manual tx  Hand to target  Sensory re-ed  Seated functional reach: crossing midline  Supine place and hold  WBearing strategies   Closed chain activities  Open chain activities  Internal and external memory aides  Multimatrix for saccades/ visual clutter/attention  Hypersensitivity strategies education  Multi-modal environment  Sustained/alternating/divided attention  Tracking tube  Oculomotor control:  saccades, con/divergence  Conv./div. Dynamic tasks  Work stations with timed transitions  Temporal Awareness  Memory and mental manipulation  Auditory processing with immediate recall  Memory retention with immediate and delayed recall  Edu on cog/vision apps      Objective Measurement Tracker:    IE (8/13/24) 1st Re-eval: () 2nd Re-eval: ( 3rd Re-eval: ()    MoCA 8/30 13/30      TM Test A        TM Test B                Nine Hole Peg Test R: 112  L: 78 R: 138  L: N/A      Functional Dexterity  Test         Strength R: 50lb, L: 20lb R: 50; L: 20      Lateral Pinch Strength R: 11lb, L: 4lb R: 10lb, L: 4lb      2 Point Pinch Strength R: 5lb, L: 1lb  R: 8lb, L: 1lb         3 Point Pinch Strength R: 6lb, L: 1lb  R: 10lb, L: 1lb

## 2024-09-19 ENCOUNTER — OFFICE VISIT (OUTPATIENT)
Facility: CLINIC | Age: 64
End: 2024-09-19
Payer: MEDICARE

## 2024-09-19 DIAGNOSIS — R53.81 PHYSICAL DECONDITIONING: ICD-10-CM

## 2024-09-19 DIAGNOSIS — Z86.73 HISTORY OF CVA (CEREBROVASCULAR ACCIDENT): Primary | ICD-10-CM

## 2024-09-19 DIAGNOSIS — R26.89 POOR BALANCE: ICD-10-CM

## 2024-09-19 DIAGNOSIS — E11.42 DIABETIC POLYNEUROPATHY ASSOCIATED WITH TYPE 2 DIABETES MELLITUS (HCC): Primary | ICD-10-CM

## 2024-09-19 DIAGNOSIS — R42 DIZZINESS: ICD-10-CM

## 2024-09-19 DIAGNOSIS — R41.89 COGNITIVE DEFICITS: ICD-10-CM

## 2024-09-19 PROCEDURE — 97530 THERAPEUTIC ACTIVITIES: CPT

## 2024-09-19 PROCEDURE — 97112 NEUROMUSCULAR REEDUCATION: CPT | Performed by: PHYSICAL THERAPIST

## 2024-09-19 PROCEDURE — 97110 THERAPEUTIC EXERCISES: CPT

## 2024-09-19 NOTE — PROGRESS NOTES
"Daily Note     Today's date: 2024  Patient name: Asad Galloway  : 1960  MRN: [id number]  Referring provider: Raj Auguste DO   Dx:   Encounter Diagnosis     ICD-10-CM    1. Diabetic polyneuropathy associated with type 2 diabetes mellitus (HCC)  E11.42       2. Poor balance  R26.89       3. Physical deconditioning  R53.81           POC expires Unit limit Auth Expiration date PT/OT + Visit Limit? Co-Insurance   24   Bomn primary, 30pcy secondary Yes                                     Subjective: Patient said \"is the dog here today?\" And legs feel weak.       Objective: See treatment diary below  TA:  - Applied gauze and bandaid to abrasion on L forearm, per pt request     NMR:   - STS with 1 airex on chair: 10 x  - Step taps to 6\" step 1 UE support 10x each leg, 2 sets   - Step ups to 6\" step with BHR's 10x each leg, 2 sets  - Stair negotiation with 2 UE support: 2 min  - Side steppin ft, 4 laps     Assessment: Pt tolerated treatment fair today. Tearful at times due to his decline in function and frequent falls. Requires CG or BUE support for all activities today due to significant instability. Rest breaks taken as needed due to fatigue. Patient would benefit from continued PT to reduce fall risk and maximize safety with all functional mobility.       Plan: Continue per plan of care.          Outcome Measures Initial Eval + DN  24 & 24        5xSTS 15.85 sec, 2 UE         TUG  - Regular  - Cognitive   17.3 sec, no AD  24.3 sec, (serial 3s)        10 meter   1.18 m/s without AD        BURGER         mCTSIB  - FTEO (firm)  - FTEC (firm)  - FTEO (foam)  - FTEC (foam)   30 sec +  30 ++  2 sec  0 sec        6MWT 100 ft (attempted but only able to complete 1 minute)        ABC 47.5%                             "

## 2024-09-19 NOTE — PROGRESS NOTES
Daily Note     Today's date: 2024  Patient name: Asad Galloway  : 1960  MRN: [id number]  Referring provider: Beatriz Hung PA-C  Dx:   Encounter Diagnoses   Name Primary?    History of CVA (cerebrovascular accident) Yes    Dizziness     Cognitive deficits      Start Time: 1800  Stop Time: 1843  Total time in clinic (min): 43 minutes    PLAN OF CARE START: 24  PLAN OF CARE END: 24  FREQUENCY: 2-3x/week  PRECAUTIONS dialysis 3x/week, falls, terrified of dogs (do not let him see Omega at all), primary language is [place] but fluent in English    Subjective: Nothing new reported today    Objective: See treatment below.    TE:   -Yellow flex bar bends for 3 minutes upwards, 2 minutes downward, and 2 minutes twisting to work on hand strength, forearm strength, endurance.     TA:   -Completed activity with 120 number board, pt counting backwards by 3's to work on attention, FMC, dexterity.   -Completed peg board activity with pt stating names of animals for each peg placed into the board. Activity focused on FMC, dexterity, attention, dual tasking. Pt repeated himself multiple times while stating names of animals.     Assessment: Pt tolerated session fairly. Fatigue reported with strengthening exercises today. Demonstrating significant difficulty with direction following, , sustained attn, sequencing, reasoning.    Plan: Continued skilled OT per POC.    New Goals added :   Pt will demonstrate improved sustained attention as evidenced by completing TM part A within 1 minute with 0 verbal cues.   Pt will demonstrate improved FMC as evidenced by improving time on 9-hole peg test by 8 seconds with each hand.

## 2024-09-24 ENCOUNTER — OFFICE VISIT (OUTPATIENT)
Facility: CLINIC | Age: 64
End: 2024-09-24
Payer: MEDICARE

## 2024-09-24 DIAGNOSIS — Z86.73 HISTORY OF CVA (CEREBROVASCULAR ACCIDENT): Primary | ICD-10-CM

## 2024-09-24 DIAGNOSIS — E11.42 DIABETIC POLYNEUROPATHY ASSOCIATED WITH TYPE 2 DIABETES MELLITUS (HCC): Primary | ICD-10-CM

## 2024-09-24 DIAGNOSIS — R42 DIZZINESS: ICD-10-CM

## 2024-09-24 DIAGNOSIS — R41.89 COGNITIVE DEFICITS: ICD-10-CM

## 2024-09-24 DIAGNOSIS — R26.89 POOR BALANCE: ICD-10-CM

## 2024-09-24 DIAGNOSIS — R53.81 PHYSICAL DECONDITIONING: ICD-10-CM

## 2024-09-24 PROCEDURE — 97530 THERAPEUTIC ACTIVITIES: CPT

## 2024-09-24 NOTE — PROGRESS NOTES
"Daily Note     Today's date: 2024  Patient name: Asad Galloway  : 1960  MRN: [id number]  Referring provider: Beatriz Hung PA-C  Dx:   Encounter Diagnoses   Name Primary?    History of CVA (cerebrovascular accident) Yes    Dizziness     Cognitive deficits                   PLAN OF CARE START: 24  PLAN OF CARE END: 24  FREQUENCY: 2-3x/week  PRECAUTIONS dialysis 3x/week, falls, terrified of dogs (do not let him see Omega at all), primary language is Togolese but fluent in English    Subjective: \"My wife said wait for supper\".  Pt presents to OT reporting significant hunger even after eating snacks provided by PT.  Pt reports he ate 2 eggs and home fries for breakfast, skipped lunch because his wife went to work.  Discussed having an easy to prepare meal available when he's home alone.  Also provided pt with sunchips and apple juice.    Objective: See treatment below.    TA:   -Pickles to penguins activity performed.  Iniitally trialed chaining cards based on similar features, however pt requires max cues for reasoning.  Downgraded to locating 1-2 cards presented aloud by therapist.  Noted to require significantly inc cues and time to locate items on R side of table, and at times unable to find items even with cues.  Completes [place] Cancellation and Draw a Man Test to assess further for R sided inattn, both of which were indicative of unilateral inattn.  Educated pt on use of visual anchor to inc R sided attn and provided on large table for remainder of activity.  Continues to require inc time and min cues to locate items on R side of table, but less time required.      [place] Cancellation Test: identified 19/35 with mod cues for sustained attention throughout.  Scanning pattern unorganized throughout.  9/16 items omitted on R side, implying unilateral inattn.  Draw a Man Test: Draws b/l arms and legs, however R sided extremities significantly shorter than L sided and ~1/3 of arm omitted.  Pt " draws a face and belly button, both shifted to L side of person    Transfers with modA with Vcs for appropriate foot positioning 2* significantly wide base of support and also cues for upright posture, as pt significantly retropulsive.    Requires assist to open apple juice bottle and chip bag.    Assessment: Pt tolerated session fairly. Pt demonstrating R sided inattn functionally and on assessments.  Pt continues to demonstrate severe cognitive deficits and requires cues for sustained attn throughout.  Does demonstrate some improvements with R sided attn with use of visual anchor.  Pt is to continue to benefit from continued OT to maximize functional performance and QOL s/p CVA.    Plan: Continued skilled OT per POC.    New Goals added 8/20:   Pt will demonstrate improved sustained attention as evidenced by completing TM part A within 1 minute with 0 verbal cues.   Pt will demonstrate improved FMC as evidenced by improving time on 9-hole peg test by 8 seconds with each hand.     Goals added 9/24:  Pt will demonstrate improved R sided peripersonal attention by locating items on b/l sides of table with minimally inc time for items on R>L and no cues.

## 2024-09-24 NOTE — PROGRESS NOTES
Daily Note     Today's date: 2024  Patient name: Asad Galloway  : 1960  MRN: [id number]  Referring provider: Raj Auguste DO   Dx:   Encounter Diagnosis     ICD-10-CM    1. Diabetic polyneuropathy associated with type 2 diabetes mellitus (HCC)  E11.42       2. Poor balance  R26.89       3. Physical deconditioning  R53.81             POC expires Unit limit Auth Expiration date PT/OT + Visit Limit? Co-Insurance   24   Bomn primary, 30pcy secondary Yes                                   PATIENT IS AFRAID OF DOGS    Subjective: Patient presents to PT stating he has butt pain and wants to assure the facility dog is not present. He states he has not eaten since 10 am this morning and is requesting a snack.      Objective: See treatment diary below    TA:   Vitals: 212/102 mmHg automatic asymptomatic (talking during reading), unable to get accurate read following 2 trials secondary to patient discussion and movement    Patient education: discussed importance of proper nutrition prior to presenting to PT especially given his DM diagnosis. Patient in good verbal understanding.    - LAQs: 10x    Assessment: Patient with limited participation in skilled PT services this date secondary to reports of not eating since 10 am this morning. Provided patient with water and pretzel snack which he ate very slowly and cautiously. Educated patient on importance of appropriate nutrition prior to PT services especially with diagnosis of DM, he verbalized understanding. Patient would benefit from continued PT to reduce fall risk and maximize safety with all functional mobility.       Plan: Continue per plan of care.          Outcome Measures Initial Eval + DN  24 & 24        5xSTS 15.85 sec, 2 UE         TUG  - Regular  - Cognitive   17.3 sec, no AD  24.3 sec, (serial 3s)        10 meter   1.18 m/s without AD        BURGER         mCTSIB  - FTEO (firm)  - FTEC (firm)  - FTEO (foam)  - FTEC (foam)   30 sec +  30  ++  2 sec  0 sec        6MWT 100 ft (attempted but only able to complete 1 minute)        ABC 47.5%

## 2024-09-25 ENCOUNTER — APPOINTMENT (OUTPATIENT)
Facility: CLINIC | Age: 64
End: 2024-09-25
Payer: MEDICARE

## 2024-09-25 NOTE — PROGRESS NOTES
"Daily Note     Today's date: 2024  Patient name: Asad Galloway  : 1960  MRN: [id number]  Referring provider: Beatriz Hung PA-C  Dx:   No diagnosis found.                 PLAN OF CARE START: 24  PLAN OF CARE END: 24  FREQUENCY: 2-3x/week  PRECAUTIONS dialysis 3x/week, falls, terrified of dogs (do not let him see Omega at all), primary language is Algerian but fluent in English    Subjective: \"My wife said wait for supper\".  Pt presents to OT reporting significant hunger even after eating snacks provided by PT.  Pt reports he ate 2 eggs and home fries for breakfast, skipped lunch because his wife went to work.  Discussed having an easy to prepare meal available when he's home alone.  Also provided pt with sunchips and apple juice.    Objective: See treatment below.    TA:   -Red bear color organization    Assessment: Pt tolerated session fairly. Pt demonstrating R sided inattn functionally and on assessments.  Pt continues to demonstrate severe cognitive deficits and requires cues for sustained attn throughout.  Does demonstrate some improvements with R sided attn with use of visual anchor.  Pt is to continue to benefit from continued OT to maximize functional performance and QOL s/p CVA.    Plan: Continued skilled OT per POC.    New Goals added :   Pt will demonstrate improved sustained attention as evidenced by completing TM part A within 1 minute with 0 verbal cues.   Pt will demonstrate improved FMC as evidenced by improving time on 9-hole peg test by 8 seconds with each hand.     Goals added :  Pt will demonstrate improved R sided peripersonal attention by locating items on b/l sides of table with minimally inc time for items on R>L and no cues.    "

## 2024-09-26 ENCOUNTER — APPOINTMENT (OUTPATIENT)
Facility: CLINIC | Age: 64
End: 2024-09-26
Payer: MEDICARE

## 2024-10-01 ENCOUNTER — APPOINTMENT (OUTPATIENT)
Facility: CLINIC | Age: 64
End: 2024-10-01
Payer: MEDICARE

## 2024-10-03 ENCOUNTER — OFFICE VISIT (OUTPATIENT)
Facility: CLINIC | Age: 64
End: 2024-10-03
Payer: MEDICARE

## 2024-10-03 DIAGNOSIS — Z86.73 HISTORY OF CVA (CEREBROVASCULAR ACCIDENT): Primary | ICD-10-CM

## 2024-10-03 DIAGNOSIS — R26.89 POOR BALANCE: ICD-10-CM

## 2024-10-03 DIAGNOSIS — R41.89 COGNITIVE DEFICITS: ICD-10-CM

## 2024-10-03 DIAGNOSIS — R53.81 PHYSICAL DECONDITIONING: ICD-10-CM

## 2024-10-03 DIAGNOSIS — E11.42 DIABETIC POLYNEUROPATHY ASSOCIATED WITH TYPE 2 DIABETES MELLITUS (HCC): Primary | ICD-10-CM

## 2024-10-03 PROCEDURE — 97112 NEUROMUSCULAR REEDUCATION: CPT

## 2024-10-03 NOTE — PROGRESS NOTES
"Daily Note     Today's date: 10/3/2024  Patient name: Asad Galloway  : 1960  MRN: [id number]  Referring provider: Raj Auguste DO   Dx:   Encounter Diagnosis     ICD-10-CM    1. Diabetic polyneuropathy associated with type 2 diabetes mellitus (HCC)  E11.42       2. Poor balance  R26.89       3. Physical deconditioning  R53.81               POC expires Unit limit Auth Expiration date PT/OT + Visit Limit? Co-Insurance   24   [facility]om[facility] primary, 30pcy secondary Yes                                   PATIENT IS AFRAID OF DOGS    Subjective: Patient presents to PT stating he lost his cane. Wife walked him into session.       Objective: See treatment diary below    TA:   Vitals: 110/74mmHg   Post: 119/86    - STS: 10 x  - Side stepping: 10 ft, 2 laps    -felt lightheaded, requested water and a snack (pretzels) and returned to baseline  - Step ups to medium river rocks, 10xs R/L, 2 sets, 1 UE  - Lateral 6\" hurdles, 4 laps, 2 UE  - Fwd 6\" hurdles, 4 laps, 1 UE   - Up/over  stairs, 2xs, 1 UE  - Ambulation with 5# TT, 150 ft   - Hip add iso ball squeezes, 1 minute  - LAQs: 10x    Assessment: Patient with improved participation in PT tx compared to previous session, but did require encouragement and redirection due to becoming tangential, emotional, and perseverating on eating pretzels. Provided patient with water and pretzel snack which he ate very slowly and cautiously. Educated patient on importance of appropriate nutrition prior to PT services especially with diagnosis of DM, he verbalized understanding. Patient demoed fair unsupported balance despite attempting to reach for support or ask PT for hand held assist. He fatigues easily, especially with alexander negotiation. Able to recover with brief seated rests. Patient would benefit from continued PT to reduce fall risk and maximize safety with all functional mobility.       Plan: Continue per plan of care.  Next re-eval 10/10         Outcome Measures Initial " Eval + DN  9/5/24 & 9/12/24        5xSTS 15.85 sec, 2 UE         TUG  - Regular  - Cognitive   17.3 sec, no AD  24.3 sec, (serial 3s)        10 meter   1.18 m/s without AD        BURGER 30/56        mCTSIB  - FTEO (firm)  - FTEC (firm)  - FTEO (foam)  - FTEC (foam)   30 sec +  30 ++  2 sec  0 sec        6MWT 100 ft (attempted but only able to complete 1 minute)        ABC 47.5%

## 2024-10-03 NOTE — PROGRESS NOTES
"Daily Note     Today's date: 10/3/2024  Patient name: Asad Galloway  : 1960  MRN: [id number]  Referring provider: Beatriz Hung PA-C  Dx:   Encounter Diagnoses   Name Primary?    History of CVA (cerebrovascular accident) Yes    Cognitive deficits          Start Time: 1715  Stop Time: 1800  Total time in clinic (min): 45 minutes    PLAN OF CARE START: 24  PLAN OF CARE END: 24  FREQUENCY: 2-3x/week  PRECAUTIONS dialysis 3x/week, falls, terrified of dogs (do not let him see Omega at all), primary language is [place] but fluent in English    Subjective: \"Do you have a napkin\"  Pt arrived from PT with a mouth full of pretzel.  Noted to move large amount of pretzel around in his mouth for >5 min despite cues for swallowing.  Pt reports he is unable to swallow it and requests to spit it out.  OT contacted speech therapy for possible swallow evaluation.  Abilities appear to vary, as last session ate pretzels with cues for pacing but no evidence of aspiration or difficulty swallowing.  Pt reports he's never been evaluated by speech, however is questionable historian.    Objective: See treatment below.    NMR:  -Red bear color organization activity performed with pt retrieving bears as designated by therapist with 2-4 components at a time.  Use of edge of table as a visual anchor on R to inc R sided attn, following multi step directions, working memory.  Recalls ~80% of directions, inc time to locate items on R side of table but only 1 cue to locate item on R    -Magnetic tangram activity performed with pieces provided on R side of table with focus on carry over of inattn compensatory strategies, , sustained attn.  Requires min cues for  and to locate items on R      TA:  -Tile matching game performed with ~40% of items provided and pt asked to complete remainder.  Focus on spatial relationships, logical reasoning, visual discrimination.  Requires Roni to match items provided and for puzzle " completion    Assessment: Pt tolerated session fairly. Demonstrates fair carry over of use of visual anchor to inc R sided peripersonal attn.  Good attention today in multi modal environment.  Pt is to continue to benefit from continued OT to maximize functional performance and QOL s/p CVA.    Plan: Continued skilled OT per POC.    New Goals added 8/20:   Pt will demonstrate improved sustained attention as evidenced by completing TM part A within 1 minute with 0 verbal cues.   Pt will demonstrate improved FMC as evidenced by improving time on 9-hole peg test by 8 seconds with each hand.     Goals added 9/24:  Pt will demonstrate improved R sided peripersonal attention by locating items on b/l sides of table with minimally inc time for items on R>L and no cues.

## 2024-10-05 DIAGNOSIS — I10 PRIMARY HYPERTENSION: ICD-10-CM

## 2024-10-07 ENCOUNTER — TELEPHONE (OUTPATIENT)
Dept: HEMATOLOGY ONCOLOGY | Facility: CLINIC | Age: 64
End: 2024-10-07

## 2024-10-07 RX ORDER — METOPROLOL SUCCINATE 25 MG/1
25 TABLET, EXTENDED RELEASE ORAL 2 TIMES DAILY
Qty: 180 TABLET | Refills: 1 | Status: SHIPPED | OUTPATIENT
Start: 2024-10-07

## 2024-10-07 NOTE — TELEPHONE ENCOUNTER
"Message received from authorization:  \"Patient is scheduled for a vasc study 11/7 @  [facility] pt's secondary coverage only allows pt to go to [facility] please r/s pt to one of the approved locations\"    Sent to be rescheduled.    "

## 2024-10-08 ENCOUNTER — OFFICE VISIT (OUTPATIENT)
Facility: CLINIC | Age: 64
End: 2024-10-08
Payer: MEDICARE

## 2024-10-08 DIAGNOSIS — R53.81 PHYSICAL DECONDITIONING: ICD-10-CM

## 2024-10-08 DIAGNOSIS — R26.89 POOR BALANCE: ICD-10-CM

## 2024-10-08 DIAGNOSIS — Z86.73 HISTORY OF CVA (CEREBROVASCULAR ACCIDENT): Primary | ICD-10-CM

## 2024-10-08 DIAGNOSIS — R41.89 COGNITIVE DEFICITS: ICD-10-CM

## 2024-10-08 DIAGNOSIS — E11.42 DIABETIC POLYNEUROPATHY ASSOCIATED WITH TYPE 2 DIABETES MELLITUS (HCC): Primary | ICD-10-CM

## 2024-10-08 PROCEDURE — 97112 NEUROMUSCULAR REEDUCATION: CPT | Performed by: PHYSICAL THERAPIST

## 2024-10-08 PROCEDURE — 97530 THERAPEUTIC ACTIVITIES: CPT

## 2024-10-08 PROCEDURE — 97110 THERAPEUTIC EXERCISES: CPT | Performed by: PHYSICAL THERAPIST

## 2024-10-08 NOTE — PROGRESS NOTES
"Daily Note     Today's date: 10/8/2024  Patient name: Asad Galloway  : 1960  MRN: [id number]  Referring provider: Raj Auguste DO   Dx:   Encounter Diagnosis     ICD-10-CM    1. Diabetic polyneuropathy associated with type 2 diabetes mellitus (HCC)  E11.42       2. Poor balance  R26.89       3. Physical deconditioning  R53.81               POC expires Unit limit Auth Expiration date PT/OT + Visit Limit? Co-Insurance   24   Bomn primary, 30pcy secondary Yes                                   PATIENT IS AFRAID OF DOGS    Subjective: Patient presents to treatment from OT, states he is doing good today      Objective: See treatment diary below    TA:   Vitals: 114/70mmHg       - STS: 10x 2 sets  - Side stepping: 10 ft, 4 laps   - LAQs: 10x  - Step up w/ uneven river rocks: 10x  - Standing marching: 10x 3 sets  - Fwd 6\" hurdles, 4 laps, 1 UE (2 sets)        Assessment: Patient tolerated treatment fairly. When completing STS patient displays reduced neuromuscular control when descending back to seat despite verbal cueing. Increased sets of exercises completed today due to patient fatigue and frequent seated rest breaks. Uneven river rocks introduced with patient able to navigate when given 2 UE support. Patient would benefit from continued PT to reduce fall risk and maximize safety with all functional mobility.       Plan: Continue per plan of care.  Next re-eval 10/10         Outcome Measures Initial Eval + DN  24 & 24        5xSTS 15.85 sec, 2 UE         TUG  - Regular  - Cognitive   17.3 sec, no AD  24.3 sec, (serial 3s)        10 meter   1.18 m/s without AD        BURGER         mCTSIB  - FTEO (firm)  - FTEC (firm)  - FTEO (foam)  - FTEC (foam)   30 sec +  30 ++  2 sec  0 sec        6MWT 100 ft (attempted but only able to complete 1 minute)        ABC 47.5%                             "
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
## 2024-10-10 ENCOUNTER — EVALUATION (OUTPATIENT)
Facility: CLINIC | Age: 64
End: 2024-10-10
Payer: MEDICARE

## 2024-10-10 ENCOUNTER — OFFICE VISIT (OUTPATIENT)
Facility: CLINIC | Age: 64
End: 2024-10-10
Payer: MEDICARE

## 2024-10-10 DIAGNOSIS — R41.89 COGNITIVE DEFICITS: ICD-10-CM

## 2024-10-10 DIAGNOSIS — R26.89 POOR BALANCE: ICD-10-CM

## 2024-10-10 DIAGNOSIS — E11.42 DIABETIC POLYNEUROPATHY ASSOCIATED WITH TYPE 2 DIABETES MELLITUS (HCC): Primary | ICD-10-CM

## 2024-10-10 DIAGNOSIS — Z86.73 HISTORY OF CVA (CEREBROVASCULAR ACCIDENT): Primary | ICD-10-CM

## 2024-10-10 DIAGNOSIS — R53.81 PHYSICAL DECONDITIONING: ICD-10-CM

## 2024-10-10 PROCEDURE — 97530 THERAPEUTIC ACTIVITIES: CPT

## 2024-10-10 PROCEDURE — 97112 NEUROMUSCULAR REEDUCATION: CPT

## 2024-10-10 NOTE — PROGRESS NOTES
Daily Note     Today's date: 10/10/2024  Patient name: Asad Galloway  : 1960  MRN: [id number]  Referring provider: Beatriz Hung PA-C  Dx:   Encounter Diagnoses   Name Primary?    History of CVA (cerebrovascular accident) Yes    Cognitive deficits            Start Time: 1715  Stop Time: 1800  Total time in clinic (min): 45 minutes    PLAN OF CARE START: 24  PLAN OF CARE END: 24  FREQUENCY: 2-3x/week  PRECAUTIONS dialysis 3x/week, falls, terrified of dogs (do not let him see Omega at all), primary language is [place] but fluent in English; DONT GIVE ANY FLUIDS OR FOODS    Subjective: Nothing reported from the patient or his wife today.     Objective: See treatment below.    TE:   -Using yellow flex bar completed 30x bends downward, 30x upwards, and 30x twists.     TA:   Completed activity with 120 number board, pt following list provided and finding numbers to place into the board. Focused on sustained attention.   Completed activity with multimatrix, pt placing numbers on top of letters in numerical/alphabetical order to work on divided attention, sequencing 2 steps. Completed with min cues from the therapist to correct mistakes.     Assessment: Pt tolerated session well. Pt was cooperative with all exercises provided by the therapist today.   Pt is to continue to benefit from continued OT to maximize functional performance and QOL s/p CVA.    Plan: Continued skilled OT per POC.    New Goals added :   Pt will demonstrate improved sustained attention as evidenced by completing TM part A within 1 minute with 0 verbal cues.   Pt will demonstrate improved FMC as evidenced by improving time on 9-hole peg test by 8 seconds with each hand.     Goals added :  Pt will demonstrate improved R sided peripersonal attention by locating items on b/l sides of table with minimally inc time for items on R>L and no cues.

## 2024-10-10 NOTE — PROGRESS NOTES
PT Re-Evaluation          POC expires Unit limit Auth Expiration date PT/OT + Visit Limit? Co-Insurance   24   Bomn primary, 30pcy secondary Yes                                            Today's date: 10/10/2024  Patient name: Asad Galloway  : 1960  MRN: [id number]  Referring provider: Raj Auguste DO  Dx:   Encounter Diagnosis     ICD-10-CM    1. Diabetic polyneuropathy associated with type 2 diabetes mellitus (HCC)  E11.42       2. Poor balance  R26.89       3. Physical deconditioning  R53.81               Assessment  Assessment details: Patient is a 64 y.o. male who presents to skilled PT for impaired balance, general deconditioning with multiple comorbidiites including hx of CVA, CKD on HD. Patient has been participating in skilled PT tx at this clinic for ~ 1 month and has demonstrated improvements in most areas. Upon today's reassessment,  patient balance scores are as follows: 33/56 BURGER, 12.6 seconds TUG without (AD), 10 Meter walk test 0.86m/sec without (AD), with overall results noting high risk for falls per normative values associated with age and diagnosis. Patient endurance scores are as follows; 275 feet with  6 minute walk test with cane  15.4 seconds with 5 x sit to stand test with overall results noting decrease functional endurance and cardio capacity. Of note, he was able to complete 4 minutes of 6MWT, which is improvement from last assessment indicating improved CV endurance. He rated 81.88% confidence with balance, indicating  improved subjective reports of balance. Patient subjective report notes the following functional limitation with transfers, walking, ADLs. Patient will  continue to benefit from skilled PT to address noted impairments and functional limitations they are causing with overall goal to return patient to highest level possible with reduced risk for falls.       Please contact me if you have any  questions or recommendations. Thank you for the referral and the opportunity to share in Asad Galloway's care.      Cut off score   All date taken from [facility] Neuro Section or Rehab Measures    BURGER test: 46/56                                              5 x STS Test:  MDC: 6 points                                                  MDC: 2.3 seconds   age norms                                                                 Age Norms   60-69 year old = M: 55, F: 55                        60-69 year old: 11.4 seconds   70-79 year old = M 54,  F: 53                       70-79 year old: 12.6 seconds     80-89 year old = M53,   F: 50                       80-89 year old: 14.8 seconds      TUG test:                                                                     10 Meter Walk Test:  MDC: 4.14 seconds       MDC: .59 ft/sec  Cut off score for Falls                                                  Age Norms  > 13.5 seconds community dwelling adults                20-29; M: 4.56 ft/sec F: 4.62 ft/sec  > 32.2 Frail Elderly                                                     30-39: M 4.76 ft/sec  F: 4.68 ft/sec          40-49: M: 4.79 ft/sec  F: 4.62 ft/sec  6 Minute Walk Test      50-59: M: 4.76 ft/sec  F: 4.56 ft/sec  MDC: 190 feet       60-69: M: 4.56 ft/sec  F: 4.26 ft/sec  Age Norms       70-+    M: 4.36 ft/sec  F: 4.16 ft/sec  60-69:    M: 1876 F: 1765  70-79:    M: 1729 F: 1545    FGA:  80-89 +: M: 1368 F; 1286       MCID: 4 points            Geriatrics/ Community Dwelling Older Adults: </= 22/30 fall risk            Geriatrics/ Community Dwelling Older Adults: </= 20/30 unexplained falls in the next 6 months            Parkinsons: </= 18/30 fall risk      Patient verbalized understanding of POC          Impairments: Abnormal coordination, Abnormal gait, Abnormal muscle tone, Abnormal or restricted ROM, Activity intolerance, Impaired balance, Impaired physical strength, Lacks appropriate HEP, Poor posture, Poor body  mechanics, Pain with function, Safety issue, Weight-bearing intolerance, Abnormal movement, Difficulty understanding, Abnormal muscle firing  Understanding of Dx/Px/POC: Excellent  Prognosis: Excellent    Patient verbalized understanding of POC.         Please contact me if you have any questions or recommendations. Thank you for the referral and the opportunity to share in Asad Galloway's care.        Plan  Plan details: complete testing, balance and functional strength/endurance re-training  Patient would benefit from: PT Eval and Skilled PT  Planned modality interventions: Biofeedback, Cryotherapy, TENS, Thermotherapy  Planned therapy interventions: Abdominal trunk stabilization, ADL training, Balance, Balance/WB training, Breathing training, Body mechanics training, Coordination, Functional ROM exercises, Gait training, HEP, Joint Mobilization, Manual Therapy, Griggs taping, Motor coordination training, Neuromuscular re-education, Patient education, Postural training, Strengthening, Stretching, Therapeutic activities, Therapeutic exercises, Therapeutic training, Transfer training, Activity modification, Work reintegration  Frequency: 2x/wk  Duration in weeks: 12  Plan of Care beginning date: 9/5/24  Plan of Care expiration date: 12 weeks - 11/28/2024  Treatment plan discussed with: Patient       Goals  Short Term Goals (4 weeks):    - Patient will improve time on TUG by 2.9 seconds to facilitate improved safety in all ambulation MET  - Patient will be independent in basic HEP 2-3 weeks  - Patient will improve 5xSTS score by 2.3 seconds to promote improved LE functional strength needed for ADLs      Long Term Goals (12 weeks):  - Patient will be independent in a comprehensive home exercise program  - Patient will improve scoring on DGI by 2.6 points to progress safety  - Patient will improve gait speed by 0.18 m/s to improve safety with community ambulation  - Patient will improve BURGER by 6 points in order  to improve static balance and reduce risk for falls  - Patient will improve scoring on FGA by 4 points to progress safety with dynamic tasks  - Patient will be able to demonstrate HT in gait without veering  - Patient will improve 6 Minute Walk Test score by 190 feet to promote improved cardiovascular endurance  - Patient will report 50% reduction in near falls in order to improve safety with functional tasks and reduce his risk for falls  - Patient will report going on walks at least 3 days per week to promote independence and improved cardiovascular endurance  - Patient will be able to ascend/descend stairs reciprocally with 1 UE assist to promote independence and safety with ADLs  - Patient will report 50% reduction in near falls when ambulating on uneven terrain      Cut off score    All date taken from [facility] Neuro Section or Rehab Measures      Cabrera/56  MDC: 6 pts  Age Norms:  60-69: M - 55   F - 55  70-79: M - 54   F - 53  80-89: M - 53   F - 50 5xSTS: June et al 2010  MDC: 2.3 sec  Age Norms:  60-69: 11.4 sec  70-79: 12.6 sec  80-89: 14.8 sec   TUG  MDC: 4.14 sec  Cut off score:  >13.5 sec community dwelling adults  >32.2 frail elderly  <20 I for basic transfers  >30 dependent on transfers 10 Meter Walk Test: Shmuel and Christiano et al 2011  MDC: 0.18 m/s  20-29: M - 1.35 m   F - 1.34 m  30-39: M - 1.43 m   F - 1.34 m  40-49: M - 1.43 m   F - 1.39 m  50-59: M - 1.43 m   F - 1.31 m  60-69: M - 1.34 m   F - 1.24 m  70-79: M - 1.26 m   F - 1.13 m  80-89: M - 0.97 m   F - 0.94 m    Household Ambulator < 0.4 m/s  Limited Community Ambulator 0.4 - 0.8 m/s  Community Ambulator 0.8 - 1.2 m/s  Safely cross the street > 1.2 m/s   FGA  MCID: 4 pts  Geriatrics/community < 22/30 fall risk  Geriatrics/community < 20/30 unexplained falls    DGI  MDC: vestibular - 4 pts  MDC: geriatric/community - 3 pts  Falls risk <19/24 mCTSIB  Norm: 20-60 yrs  Eyes open firm: norm sway 0.21-0.48  Eyes closed firm: norm sway  0.48-0.99  Eyes open foam: norm sway 0.38-0.71  Eyes closed foam: norm sway 0.70-2.22   6 Minute Walk Test  MDC: 190.98 ft  MCID: 164 ft    Age Norms  60-69: M - 1876 ft (571.80 m)  F - 1765 ft (537.98 m)  70-79: M - 1729 ft (527.00 m)  F - 1545 ft (470.92 m)  80-89: M - 1368 ft (416.97 m)  F - 1286 ft (391.97 m) ABC: Smith & Tre, 2003  <67% increased risk for falls   [place] Monofilaments  Evaluator Size:        Force (grams):          Hand/Dorsal Thresholds:        Plantar Thresholds:  - 1.65                       - 0.008                       - Normal                                 - Normal  - 2.36                       - 0.02                         - Normal                                 - Normal  - 2.44                       - 0.04                         - Normal                                 - Normal  - 2.83                       - 0.07                         - Normal                                 - Normal  - 3.22                       - 0.16                         - Diminished light touch          - Normal  - 3.61                       - 0.40                         - Diminished light touch          - Normal  - 3.84                       - 0.60                         - Diminished protective           - Diminished light touch  - 4.08                       - 1.00                         - Diminished protective           - Diminished light touch  - 4.17                       - 1.40                         - Diminished protective           - Diminished light touch  - 4.31                       - 2.00                         - Diminished protective           - Diminished light touch  - 4.56                       - 4.00                         - Loss of protective sense      - Diminished protective  - 4.74                       - 6.00                         - Loss of protective sense      - Diminished protective  - 4.93                       - 8.00                         - Loss of  protective sense      - Diminished protective  - 5.07                       - 10.0                         - Loss of protective sense     - Loss of protective sense  - 5.18                       - 15.0                         - Loss of protective sense     - Loss of protective sense  - 5.46                       - 26.0                         - Loss of protective sense     - Loss of protective sense  - 5.88                       - 60.0                         - Loss of protective sense     - Loss of protective sense  - 6.10                       - 100                          - Loss of protective sense     - Loss of protective sense  - 6.45                       - 180                          - Loss of protective sense     - Loss of protective sense  - 6.65                       - 300                          - Deep pressure sense only  - Deep pressure sense only         Subjective    History of Present Illness  - Mechanism of injury: Patient was being tx at [facility] OP location; transitioned to [place] for neuro/balance PT and neuro OT. He has CKD on HD M, W, Fx 5 years ; recent visit to ED 8/23 fistula bleeding but resolved. He  has a past medical history of Cerebrovascular accident (CVA) due to thrombosis of left middle cerebral artery (HCC) (07/29/2018), Chronic kidney disease, Coronary artery disease, Diabetes mellitus ([facility]), Dialysis patient ([facility]), Fistula, GERD (gastroesophageal reflux disease), History of coronary artery stent placement, Hypercholesteremia, Hyperlipidemia, Hypertension, Infectious viral hepatitis, Limb alert care status, Neuropathy, Obesity, Osteomyelitis (HCC), PVC's (premature ventricular contractions), Stroke ([facility]), TIA (transient ischemic attack) (10/28/2018). He reports impaired balance and increased reliance on family for assist with ADLs; he wants to improve balance and independence    Update 10/10/24: Patient reports balance is improving with PT. He feels stronger    - Primary AD:  "cane intermittently   - Assist level at home: family assists him with ADLs  - Decreased fine motor tasks: No    Patient goal:  \"I want everything better \"     Pain  - Current pain ratin/10  - At best pain ratin/10  - At worst pain ratin/10  - Location: low back  - Aggravating factors: unsure     Social Support  - Steps to enter house: 3  - Stairs in house: yes but bedroom on 1st floor    - Lives in: multi story house   - Lives with: wife and 2 kids     - Employment status: retired   - Hand dominance: right    Treatments  - Previous treatment[facility], [facility] this clinic  - Current treatment: initiating PT eval/ tx ; also sees neuro OT        Objective     UE SCreen    LE MMT  - R Hip Flexion: 3+/5  L Hip Flexion: 4-/5  - R Hip Abduction: 4-/5  L Hip Abduction: 4-/5  - R Hip Adduction: 4-/5  L Hip Adduction: 4-/5  - R Knee Extension: 4-/5  L Knee Extension: 4-/5  - R Ankle DF: 4/5   L Ankle DF: 4/5  - R Ankle PF: 4-/5   L Ankle PF: 4/5    Sensation  - Light touch: intact       Coordination  - Heel to Shin: impaired B/L  - Alternate Toe Taps: impaired B/L          Postural Screen  - Observation: forward head        Gait  - Abnormalities: ambulates without AD on eval, demos narrow JOSE DAVID, inconsistent step length, lateral instability            Outcome Measures Initial Eval  24 Re-eval  10/10/24       5xSTS 15.85 sec, 2 UE  15.41 sec, 2 UE        TUG  - Regular  - Cognitive   17.3 sec, no AD  24.3 sec, (serial 3s)   12.59 sec, no AD  18.56 sec, no AD (serial 3s)       10 meter   1.18 m/s without AD   0.86 m/s with cane       JENNYFER        mCTSIB  - FTEO (firm)  - FTEC (firm)  - FTEO (foam)  - FTEC (foam)   30 sec +  30 ++  2 sec  0 sec   30 sec+  30 sec+  3 sec  0 sec       6MWT 100 ft (attempted but only able to complete 1 minute) 275 ft in 4 minutes        ABC 47.5% 81.88%                           Precautions: falls   Past Medical History:   Diagnosis Date    Cerebrovascular accident " (CVA) due to thrombosis of left middle cerebral artery (HCC) 07/29/2018    Chronic kidney disease     Coronary artery disease     Diabetes mellitus (HCC)     not on meds    Dialysis patient (HCC)     M-W-F    Fistula     left upper arm for hemodialyis    GERD (gastroesophageal reflux disease)     History of coronary artery stent placement     x3    Hypercholesteremia     Hyperlipidemia     Hypertension     Infectious viral hepatitis     B as child    Limb alert care status     no BP/IV left arm    Neuropathy     Obesity     Osteomyelitis (HCC)     last assessed 11/4/16-per son not currently    PVC's (premature ventricular contractions)     sees [facility]    Stroke ([facility])     last weeof July 2018 [facility]    TIA (transient ischemic attack) 10/28/2018    Wears dentures     Wears glasses

## 2024-10-14 DIAGNOSIS — I10 PRIMARY HYPERTENSION: ICD-10-CM

## 2024-10-14 NOTE — TELEPHONE ENCOUNTER
Reason for call:   [x] Refill   [] Prior Auth  [] Other:     Office:   [] PCP/Provider -   [x] Specialty/Provider - [facility]     Medication:     sacubitril-valsartan (Entresto) 24-26 MG 1 tablet daily        Pharmacy: [facility]     Does the patient have enough for 3 days?   [] Yes   [x] No - Send as HP to POD

## 2024-10-15 ENCOUNTER — EVALUATION (OUTPATIENT)
Facility: CLINIC | Age: 64
End: 2024-10-15
Payer: MEDICARE

## 2024-10-15 ENCOUNTER — OFFICE VISIT (OUTPATIENT)
Facility: CLINIC | Age: 64
End: 2024-10-15
Payer: MEDICARE

## 2024-10-15 DIAGNOSIS — E11.42 DIABETIC POLYNEUROPATHY ASSOCIATED WITH TYPE 2 DIABETES MELLITUS (HCC): Primary | ICD-10-CM

## 2024-10-15 DIAGNOSIS — R41.89 COGNITIVE DEFICITS: ICD-10-CM

## 2024-10-15 DIAGNOSIS — Z86.73 HISTORY OF CVA (CEREBROVASCULAR ACCIDENT): Primary | ICD-10-CM

## 2024-10-15 DIAGNOSIS — R53.81 PHYSICAL DECONDITIONING: ICD-10-CM

## 2024-10-15 DIAGNOSIS — R26.89 POOR BALANCE: ICD-10-CM

## 2024-10-15 PROCEDURE — 97530 THERAPEUTIC ACTIVITIES: CPT

## 2024-10-15 PROCEDURE — 97112 NEUROMUSCULAR REEDUCATION: CPT

## 2024-10-15 NOTE — PROGRESS NOTES
"Daily Note     Today's date: 10/15/2024  Patient name: Asad Galloway  : 1960  MRN: [id number]  Referring provider: Raj Auguste DO   Dx:   Encounter Diagnosis     ICD-10-CM    1. Diabetic polyneuropathy associated with type 2 diabetes mellitus (HCC)  E11.42       2. Poor balance  R26.89       3. Physical deconditioning  R53.81               POC expires Unit limit Auth Expiration date PT/OT + Visit Limit? Co-Insurance   24   Bomn primary, 30pcy secondary Yes                                   PATIENT IS AFRAID OF DOGS    Subjective: Patient presents to treatment from OT, stating that he does not want to come back to therapy because he is not allowed to have water.     Objective: See treatment diary below    TA:   Vitals:   BP: 99/55 mmHg   HR: 76 bpm  SpO2: 99%    - STS: 5x  NT:   - Side stepping: 10 ft, 4 laps   - LAQs: 10x  - Step up w/ uneven river rocks: 10x  - Standing marching: 10x 3 sets  - Fwd 6\" hurdles, 4 laps, 1 UE (2 sets)    Assessment: Patient tolerated treatment poorly. Patient asked to have water at the beginning of therapy, however per wife, patient is on fluid restriction due to dialysis. Able to perform 5 x STS with minAx1. He requested to leave due to fluid denial. Called his wife Christine whom came to pick patient up. Reinitiate therapy next session. Patient would benefit from continued PT to reduce fall risk and maximize safety with all functional mobility.       Plan: Continue per plan of care.  Next re-eval 10/10         Outcome Measures Initial Eval + DN  24 & 24        5xSTS 15.85 sec, 2 UE         TUG  - Regular  - Cognitive   17.3 sec, no AD  24.3 sec, (serial 3s)        10 meter   1.18 m/s without AD        BURGER         mCTSIB  - FTEO (firm)  - FTEC (firm)  - FTEO (foam)  - FTEC (foam)   30 sec +  30 ++  2 sec  0 sec        6MWT 100 ft (attempted but only able to complete 1 minute)        ABC 47.5%                             "

## 2024-10-15 NOTE — PROGRESS NOTES
"OCCUPATIONAL THERAPY RE-EVALUATION    Today's Date: 10/15/2024  Patient Name: Asad Galloway  : 1960  MRN: [id number]  Referring Provider: Beatriz Hung PA-C  Dx: History of CVA (cerebrovascular accident) [Z86.73]    SKILLED ANALYSIS:  Pt is a right hand dominant 64 year old male presenting to OP OT s/p recent fall and decrease in functional status. Pt has a history of CVA from 2018. Per the patients wife, the pt has required assistance with ADLs and IADLs since his CVA in 2018, however has had a significant decline since his recent fall resulting in a hospitalization.  Pt continues to require assistance with dressing, bathing, bathroom management, and all IADLs. Pt's wife reports significant improvement in patient's functional cognition in the past month and pt reports \"everything is better\".  Pt's willingness to participate in sessions and agitation significantly waxes and wanes.  Based on assessments, pt demonstrating significant improvements with sustained and divided attention on Trail Making Tests, mild improvement on MoCA (1 point), mild decline in grasp and pinch strengths b/l UE, significant decline in L UE FMC on Nine-Hole Peg Test.  Pt continues to demonstrate deficits in the following domains: EF skills, frustration tolerance, emotional regulation, attention, recall,  skills, standing balance, functional activity tolerance, UE strength, hand strength.  Deficits are noted based on the following assessments: MoCA, TM part A, ROM, dynamometer, pinch gauge, and clinical observation. These deficits are impairing the patient ability to perform ADLs and IADLs to a level he was at before his most recent fall. Recommend OP OT 2x/wk for an additional 4 weeks with focus on aforementioned deficits to maximize functional recovery, improve QOL, and reduce caregiver burden.  Findings and recommendations discussed with pt, and they are in agreement.    Educated pt on charges of insurance, acknowledge " "understanding, and are in agreement.    PLEASE COMPLETE Nine-Hole Peg Test R UE, FDT b/l UE, MMT NEXT SESSION    PLAN OF CARE START: 8/13/24  PLAN OF CARE END: 11/13/24  FREQUENCY: 2x/week  PRECAUTIONS dialysis 3x/week; NO FOOD OR WATER CAN BE GIVEN TO PATIENT; scared of dogs    Subjective: \"I'm doing better\" \"I'm not f*cking coming back here because you never give me water\".  Attempted multiple times to explain fluid restriction to patient, no understanding demonstrated.    Occupational Profile  Pt lives with at home with his wife and two kids (23 and 28), other two kids are  and live on their own. Pt lives in a bi-level home. There are 5 steps down and 7 steps up. Pt resides downstairs. They have a shower bench, grab bars, handles on toilet, and railing to assist with getting into and out of bed. Pt is a retired , he has been retired since his stroke. Pt is not driving. Pt wife reports she provides ~80% assistance for all ADLs. Pt wife reports she does all IADLs. Pt was not independent before his recent fall, however he is significantly more dependent at this point.     PATIENT GOAL: \"To get better, to get stronger\"    HISTORY OF PRESENT ILLNESS:   Asad Galloway is a 64 y.o. male patient who originally presented to the hospital on 7/17/2024 due to ambulatory dysfunction after fall.  Patient was admitted to [facility] was discharged with a diagnosis of acute DVT on 07/16.  Patient presented to the emergency department on 07/17 after a mechanical fall at home.  Fall was witnessed.  Patient did not syncopized.  Patient's trauma evaluation was negative in the emergency department.  Patient was noted to have profound weakness with attempts to lift himself off of the toilet.  PT/OT recommended short-term rehab.  Today, wife at bedside is declining short-term rehab.  Family would like to be discharged with outpatient physical therapy/Occupational Therapy.      PMH:   Past Medical History: "   Diagnosis Date    Cerebrovascular accident (CVA) due to thrombosis of left middle cerebral artery (HCC) 07/29/2018    Chronic kidney disease     Coronary artery disease     Diabetes mellitus (HCC)     not on meds    Dialysis patient (HCC)     M-W-F    Fistula     left upper arm for hemodialyis    GERD (gastroesophageal reflux disease)     History of coronary artery stent placement     x3    Hypercholesteremia     Hyperlipidemia     Hypertension     Infectious viral hepatitis     B as child    Limb alert care status     no BP/IV left arm    Neuropathy     Obesity     Osteomyelitis (HCC)     last assessed 11/4/16-per son not currently    PVC's (premature ventricular contractions)     sees [facility]    Stroke (HCC)     last weeof July 2018 [facility]    TIA (transient ischemic attack) 10/28/2018    Wears dentures     Wears glasses        Past Surgical Hx:   Past Surgical History:   Procedure Laterality Date    ABDOMINAL SURGERY      CARDIAC CATHETERIZATION N/A 05/02/2022    Procedure: Cardiac Coronary Angiogram;  Surgeon: Sam Davis MD;  [facility];  Service: Cardiology    CARDIAC CATHETERIZATION N/A 05/02/2022    Procedure: Cardiac pci;  Surgeon: Sam Davis MD;  [facility];  Service: Cardiology    CARDIAC CATHETERIZATION  02/01/2023    Procedure: Cardiac catheterization;  Surgeon: Sam Davis MD;  [facility];  Service: Cardiology    CARDIAC CATHETERIZATION N/A 02/01/2023    Procedure: Cardiac pci;  Surgeon: Sam Davis MD;  [facility];  Service: Cardiology    CARDIAC CATHETERIZATION N/A 02/01/2023    Procedure: Cardiac Coronary Angiogram;  Surgeon: Sam Davis MD;  [facility];  Service: Cardiology    CARDIAC CATHETERIZATION N/A 02/01/2023    Procedure: Cardiac other-IVUS;  Surgeon: Sam Davis MD;  [facility];  Service: Cardiology    CHOLECYSTECTOMY      Percutaneous    COLONOSCOPY  "     CYSTOSCOPY      HEMODIALYSIS ADULT  1/22/2024    HEMODIALYSIS ADULT  1/24/2024    IR LOWER EXTREMITY ANGIOGRAM  9/29/2023    IR LOWER EXTREMITY ANGIOGRAM  2/26/2024    OTHER SURGICAL HISTORY      \"stimulator to control bowel movements\"    OR ESOPHAGOGASTRODUODENOSCOPY TRANSORAL DIAGNOSTIC N/A 09/27/2016    Procedure: ESOPHAGOGASTRODUODENOSCOPY (EGD);  Surgeon: Adele Rowe MD;  Location: [facility] GI [facility];  Service: Gastroenterology    OR LAPAROSCOPY SURG CHOLECYSTECTOMY N/A 02/29/2016    Procedure: LAPAROSCOPIC CHOLECYSTECTOMY ;  Surgeon: Cliff Roman DO;  Location: [facility];  Service: General    OR SLCTV CATHJ 3RD+ ORD SLCTV ABDL PEL/LXTR BRNCH Left 9/29/2023    Procedure: Left leg angiogram with intervention;  Surgeon: Michelle Galvan MD;  Location: [facility];  Service: Vascular    OR SLCTV CATHJ 3RD+ ORD SLCTV ABDL PEL/LXTR BRNCH Left 2/26/2024    Procedure: Left leg angiogram antegrade access, left popliteal angioplasty;  Surgeon: Michelle Galvan MD;  Location: [facility];  Service: Vascular    ROTATOR CUFF REPAIR Right     SPINAL CORD STIMULATOR IMPLANT      \"Medtronic interstim model # 3058- in lower back to control bowel movements-currently turned off-battery is dead\"    TOE AMPUTATION Right 10/28/2016    Procedure: 3RD TOE AMPUTATION ;  Surgeon: Anjel Salas DPM;  Location: [facility];  Service:         Pain: FLACC 0    Objective    Upper Extremities:  Pt is right hand dominant                MARIFER: RUE: 46lb LUE: 19lb  The age norm is approximately 76-89 lbs, indicating decreased  strength.                2 point pinch: RUE: 7, LUE: 1  3 point pinch: RUE: 8, LUE: 1  Lateral pinch: RUE: 13, LUE: 3                Range of Motion: PLEASE ASSESS NEXT SESSION  AROM:   R UE   - Shoulder flexion: 93  - Shoulder scaption/abduction:101  - Shoulder extension:   - Shoulder internal rotation: WNL  - Shoulder external rotation:32  - Elbow flexion: WNL  - Elbow extension: WNL  - Wrist " flexion:WNL  - Wrist extension: WNL  - Pronation: WNL  - Supination: WNL  - Radial deviation: WNL  - Ulnar deviation: WNL  - Finger extension: WNL   - Finger flexion: WNL    L UE: WFL     Subluxation: none    Scapular winging: none    Spasticity: none     Finger to Nose Testing with Visual Occlusion (proprioception):  WNL    Finger to Nose Testing without Visual Occlusion: WNL     Monofilaments/sensory testing: WNL    Functional Cognition:  Highest level of education: High school    Víctor Cognitive Assessment Version 8.2 (MoCA V8.2)  Visuospatial/executive functionin  Namin/3  Memory: 1st trial:  2, 2nd trial:  25  Attention/concentration: 2/  List of letters:   Serial Seven Subtraction:  2/3 w/ 2 errors  Language/sentence repetition:  1/2  Language Fluency:  0/1, 4 words  Abstract/Correlational Thinkin/2  Delayed Recall:  0/5  Orientation:  /6. Oriented to month, day, place, city.  Reports    Memory Index Score: 1/15     Raw Score:  13+1= 14/30, MIS:  15, indicative of MODERATE neurocognitive impairments.    MoCA Scoring        Normal: 26+         Mild Cognitive Impairment: 18-25          Moderate Cognitive Impairment: 10-17         Severe Cognitive Impairment: <10      NINE-HOLE PEG TEST: assesses dexterity/fine motor coordination. Alternative scoring: the number of pegs placed in 50 or 100 seconds can be recorded.     R hand: 138 seconds (not re-assessed)  L hand: 180 seconds with 8 pegs dropped 6x compensatory use of R UE (previously 75 seconds)      Norms:   Sex Age Average R Average L   Male 61-65 20.87 21.60      Pt demonstrates decreased FMC compared to norms for age/sex         Trail making Part A and Part B:   Part A: 2:07 with 2 VCs (last month: 2:19 with 6VC; IE: 1:45 with 4  VCs for recall of instructions, problem solving)  Part B: 7:56 with 4 cues to locate items, 3 cues for direction following, 6 cues for sequencing    Indicating deficits: Part A deficit > 33.22  seconds for age related norms    GOALS:   Short Term Goals:  Pt will increase B UE  strength by 4 lbs to improve performance with ADLs DECLINED  Pt will increase R lateral pinch strength by 2 lb to improve performance with ADLs/dressing ACHIEVED  Pt will demonstrate improved functional cognition as evidenced by improving score on the MoCA to 10/30 to improve performance during ADLs and IADLs ACHIEVED 9/12  Pt will demonstrate improved sustained attention as evidenced by completing TM part A within 1 minute with 0 verbal cues. IMPROVING  Pt will demonstrate improved FMC as evidenced by improving time on 9-hole peg test by 8 seconds with each hand. DECLINED    Long Term Goals: 8-12 weeks  Pt will increase B UE  strength by 7 lbs to improve performance with ADLs   Pt will increase R lateral pinch strength by 3 lb to improve performance with ADLs/dressing  Pt will demonstrate improved functional cognition as evidenced by improving score on the MoCA to 12/30 to improve performance during ADLs and IADLs ACHIEVED 9/12  Pt will improve functional activity tolerance and dressing to complete with no more than mod(A) from his wife per report.   Pt will improve functional activity tolerance and bathroom skills to complete with no more than mod(A) from his wife per report.     Goals added 10/15:  Pt will demonstrate improved overall cognition by scoring at least 17/30 on MoCA for carry over with daily activities.    OTHER PLANNED THERAPY INTERVENTIONS:   Supine, seated, and in stance neuro re-ed  Tricep AG  NMES/FES  FMC/prehension  Timed Trials  Manual tx  Hand to target  Sensory re-ed  Seated functional reach: crossing midline  Supine place and hold  WBearing strategies   Closed chain activities  Open chain activities  Internal and external memory aides  Multimatrix for saccades/ visual clutter/attention  Hypersensitivity strategies education  Multi-modal environment  Sustained/alternating/divided attention  Tracking  tube  Oculomotor control:  saccades, con/divergence  Conv./div. Dynamic tasks  Work stations with timed transitions  Temporal Awareness  Memory and mental manipulation  Auditory processing with immediate recall  Memory retention with immediate and delayed recall  Edu on cog/vision apps      Objective Measurement Tracker:    IE (8/13/24) 1st Re-eval: () 2nd Re-eval: ( 3rd Re-eval: ()    MoCA 8/30 13/30      TM Test A        TM Test B                Nine Hole Peg Test R: 112  L: 78 R: 138  L: N/A      Functional Dexterity Test         Strength R: 50lb, L: 20lb R: 50; L: 20      Lateral Pinch Strength R: 11lb, L: 4lb R: 10lb, L: 4lb      2 Point Pinch Strength R: 5lb, L: 1lb  R: 8lb, L: 1lb         3 Point Pinch Strength R: 6lb, L: 1lb  R: 10lb, L: 1lb

## 2024-10-17 ENCOUNTER — OFFICE VISIT (OUTPATIENT)
Facility: CLINIC | Age: 64
End: 2024-10-17
Payer: MEDICARE

## 2024-10-17 ENCOUNTER — TELEPHONE (OUTPATIENT)
Age: 64
End: 2024-10-17

## 2024-10-17 DIAGNOSIS — R42 DIZZINESS: ICD-10-CM

## 2024-10-17 DIAGNOSIS — Z86.73 HISTORY OF CVA (CEREBROVASCULAR ACCIDENT): Primary | ICD-10-CM

## 2024-10-17 DIAGNOSIS — R41.89 COGNITIVE DEFICITS: ICD-10-CM

## 2024-10-17 DIAGNOSIS — E11.42 DIABETIC POLYNEUROPATHY ASSOCIATED WITH TYPE 2 DIABETES MELLITUS (HCC): Primary | ICD-10-CM

## 2024-10-17 DIAGNOSIS — R26.89 POOR BALANCE: ICD-10-CM

## 2024-10-17 PROCEDURE — 97530 THERAPEUTIC ACTIVITIES: CPT

## 2024-10-17 PROCEDURE — 97110 THERAPEUTIC EXERCISES: CPT

## 2024-10-17 PROCEDURE — 97112 NEUROMUSCULAR REEDUCATION: CPT

## 2024-10-17 RX ORDER — SACUBITRIL AND VALSARTAN 24; 26 MG/1; MG/1
1 TABLET, FILM COATED ORAL DAILY
Qty: 30 TABLET | Refills: 5 | Status: SHIPPED | OUTPATIENT
Start: 2024-10-17

## 2024-10-17 NOTE — PROGRESS NOTES
"Daily Note     Today's date: 10/17/2024  Patient name: Asad Galloway  : 1960  MRN: [id number]  Referring provider: Beatriz Hung PA-C  Dx:   Encounter Diagnoses   Name Primary?    History of CVA (cerebrovascular accident) Yes    Cognitive deficits     Dizziness      Start Time: 164  Stop Time: 1715  Total time in clinic (min): 33 minutes    PLAN OF CARE START: 24  PLAN OF CARE END: 24  FREQUENCY: 2-3x/week  PRECAUTIONS dialysis 3x/week, falls, terrified of dogs (do not let him see Omega at all), primary language is Jamaican but fluent in English; DONT GIVE ANY FLUIDS OR FOODS    Subjective: Pt states he feels bad about his \"outburst\" this past Tuesday during PT. Pt and his wife state they would like to continue coming to this facility for PT and OT.     Objective: See treatment below.    TE:   -Using yellow flex bar completed 30x bends downward, 30x upwards, and 30x twists.     TA:   -Completed shape race exercise to work on  skills, sustained attention, visual discrimination. Completed with min cues from the therapist and increased time.   -Completed 1 following directions worksheet to address simple 1-2 step direction following, attention. Required mod cues from the therapist to complete. Pt was provided with an additional 3 to be completed at home with his wife. Pt and his wife showed good understanding.   -Discussed home activities for the pt to engage with at home to have the patient be more activity. Pt's wife was receptive to these ideas and stated she would start to incorporate them.     Assessment: Pt tolerated session well. Pt was cooperative with all exercises provided by the therapist today.   Pt is to continue to benefit from continued OT to maximize functional performance and QOL s/p CVA.    Plan: Continued skilled OT per POC.    New Goals added :   Pt will demonstrate improved sustained attention as evidenced by completing TM part A within 1 minute with 0 verbal cues. "   Pt will demonstrate improved FMC as evidenced by improving time on 9-hole peg test by 8 seconds with each hand.     Goals added 9/24:  Pt will demonstrate improved R sided peripersonal attention by locating items on b/l sides of table with minimally inc time for items on R>L and no cues.

## 2024-10-17 NOTE — PROGRESS NOTES
"Daily Note     Today's date: 10/17/2024  Patient name: Asad Galloway  : 1960  MRN: [id number]  Referring provider: Raj Auguste DO   Dx:   Encounter Diagnosis     ICD-10-CM    1. Diabetic polyneuropathy associated with type 2 diabetes mellitus (HCC)  E11.42       2. Poor balance  R26.89                 POC expires Unit limit Auth Expiration date PT/OT + Visit Limit? Co-Insurance   24   Bomn primary, 30pcy secondary Yes                                   PATIENT IS AFRAID OF DOGS    FLUID RESTRICTION, AND DO NOT GIVE PATIENT FOOD (PER WIFES REQUEST DUE TO SWALLOWING ISSUES)    Subjective: Patient presents to treatment from OT, wife present throughout entirety of session.     Objective: See treatment diary below    TA:   Vitals:   BP: 132/64 mmHg ; post  126/70mmHg  HR: 70 bpm; post: 80bpm  SpO2: 99%    - STS to fatigue, 10xs, 8xs  - Side stepping: 10 ft, 4 laps   - LAQs: 1 minute  -Hip add iso ball squeezes, 1 minute   - Step up w/ medium river rocks: 10x R/L, 1 UE  - Standing marchin minute  - Fwd 6\" hurdles, 8 laps, 0 UE  - Hip abd with YTB 1 minute   - Ambulation up/down ramp  5 laps    Assessment: Patient tolerated treatment well with improved behavior this session. PT and OT discussed with wife and they wish to continue therapy services at this clinic. Patient requires reminders that he is on fluid restriction due to dialysis, wife present and enforcing as well. He tolerated session well, alternating between standing and seated exercises due to reports of fatigue. Several significant LOB during standing marching and alexander negotiation, indicating poor single limb stability. Patient would benefit from continued PT to reduce fall risk and maximize safety with all functional mobility.       Plan: Continue per plan of care.         Outcome Measures Initial Eval  24 Re-eval  10/10/24       5xSTS 15.85 sec, 2 UE  15.41 sec, 2 UE        TUG  - Regular  - Cognitive   17.3 sec, no AD  24.3 sec, " (serial 3s)   12.59 sec, no AD  18.56 sec, no AD (serial 3s)       10 meter   1.18 m/s without AD   0.86 m/s with cane       BURGER 30/56 33/56       mCTSIB  - FTEO (firm)  - FTEC (firm)  - FTEO (foam)  - FTEC (foam)   30 sec +  30 ++  2 sec  0 sec   30 sec+  30 sec+  3 sec  0 sec       6MWT 100 ft (attempted but only able to complete 1 minute) 275 ft in 4 minutes        ABC 47.5% 81.88%                           Precautions: falls   Past Medical History:   Diagnosis Date    Cerebrovascular accident (CVA) due to thrombosis of left middle cerebral artery (HCC) 07/29/2018    Chronic kidney disease     Coronary artery disease     Diabetes mellitus (HCC)     not on meds    Dialysis patient (HCC)     M-W-F    Fistula     left upper arm for hemodialyis    GERD (gastroesophageal reflux disease)     History of coronary artery stent placement     x3    Hypercholesteremia     Hyperlipidemia     Hypertension     Infectious viral hepatitis     B as child    Limb alert care status     no BP/IV left arm    Neuropathy     Obesity     Osteomyelitis (HCC)     last assessed 11/4/16-per son not currently    PVC's (premature ventricular contractions)     sees [facility]    Stroke (HCC)     last weeof July 2018 [facility]    TIA (transient ischemic attack) 10/28/2018    Wears dentures     Wears glasses

## 2024-10-17 NOTE — TELEPHONE ENCOUNTER
Patient called requesting refill for sacubitril-valsartan (Entresto) 24-26 MG TABS. Patient made aware medication was refilled on 10/17/24 for 30 with 5 refills to [facility]/pharmacy #[id number] - [place] - [address]  pharmacy. Patient instructed to contact the pharmacy to obtain refills of medication. Patient verbalized understanding.     E-Prescribing Status: Receipt confirmed by pharmacy (10/17/2024  1:35 PM EDT)

## 2024-10-22 ENCOUNTER — OFFICE VISIT (OUTPATIENT)
Facility: CLINIC | Age: 64
End: 2024-10-22
Payer: MEDICARE

## 2024-10-22 DIAGNOSIS — Z86.73 HISTORY OF CVA (CEREBROVASCULAR ACCIDENT): Primary | ICD-10-CM

## 2024-10-22 DIAGNOSIS — E11.42 DIABETIC POLYNEUROPATHY ASSOCIATED WITH TYPE 2 DIABETES MELLITUS (HCC): Primary | ICD-10-CM

## 2024-10-22 DIAGNOSIS — R53.81 PHYSICAL DECONDITIONING: ICD-10-CM

## 2024-10-22 DIAGNOSIS — R26.89 POOR BALANCE: ICD-10-CM

## 2024-10-22 DIAGNOSIS — R41.89 COGNITIVE DEFICITS: ICD-10-CM

## 2024-10-22 PROCEDURE — 97530 THERAPEUTIC ACTIVITIES: CPT

## 2024-10-22 PROCEDURE — 97110 THERAPEUTIC EXERCISES: CPT

## 2024-10-22 NOTE — PROGRESS NOTES
Daily Note     Today's date: 10/22/2024  Patient name: Asad Galloway  : 1960  MRN: [id number]  Referring provider: Raj Auguste DO   Dx:   Encounter Diagnosis     ICD-10-CM    1. Diabetic polyneuropathy associated with type 2 diabetes mellitus (HCC)  E11.42       2. Poor balance  R26.89       3. Physical deconditioning  R53.81                   POC expires Unit limit Auth Expiration date PT/OT + Visit Limit? Co-Insurance   24   Bomn primary, 30pcy secondary Yes                                   PATIENT IS AFRAID OF DOGS    FLUID RESTRICTION, AND DO NOT GIVE PATIENT FOOD (PER WIFES REQUEST DUE TO SWALLOWING ISSUES)    Subjective: Patient presents to treatment with wife no new changes, complaints, or falls    Objective: See treatment diary below    TA:   Vitals:   BP: 120/64 mmHg ; post 126/70mmHg  HR: 70 bpm; post: 80bpm  SpO2: 99%    - STS w/ foam pad on chair to fatigue, 10xs, xs  - Hip add iso ball squeezes: 2 minute   - LAQs: 1 minute  - Complaints of dizziness and lightheadedness upon standing BP: 89/67 mmHg  - Seated marchinx, 2 sets  BP: 108/53 mmHg  - Seated trunk rotations: 1 min    Assessment: Patient tolerated treatment well with improved behavior this session. Patient displayed improved anterior weight shift without need for verbal cueing this date suggesting good carry over and learning. He requested intermittent rest periods due to fatigue this session suggesting decreased tolerance to activity and limitations in endurance. Patient with complaints of lightheadedness and dizziness upon standing BP: 89/67 mmHg which improved following seated exercises. Adjusted remaining exercises to account for low BP. Patient would benefit from continued PT to reduce fall risk and maximize safety with all functional mobility.       Plan: Continue per plan of care.         Outcome Measures Initial Eval  24 Re-eval  10/10/24       5xSTS 15.85 sec, 2 UE  15.41 sec, 2 UE        TUG  - Regular  -  Cognitive   17.3 sec, no AD  24.3 sec, (serial 3s)   12.59 sec, no AD  18.56 sec, no AD (serial 3s)       10 meter   1.18 m/s without AD   0.86 m/s with cane       BURGER 30/56 33/56       mCTSIB  - FTEO (firm)  - FTEC (firm)  - FTEO (foam)  - FTEC (foam)   30 sec +  30 ++  2 sec  0 sec   30 sec+  30 sec+  3 sec  0 sec       6MWT 100 ft (attempted but only able to complete 1 minute) 275 ft in 4 minutes        ABC 47.5% 81.88%                           Precautions: falls   Past Medical History:   Diagnosis Date    Cerebrovascular accident (CVA) due to thrombosis of left middle cerebral artery (HCC) 07/29/2018    Chronic kidney disease     Coronary artery disease     Diabetes mellitus (HCC)     not on meds    Dialysis patient (HCC)     M-W-F    Fistula     left upper arm for hemodialyis    GERD (gastroesophageal reflux disease)     History of coronary artery stent placement     x3    Hypercholesteremia     Hyperlipidemia     Hypertension     Infectious viral hepatitis     B as child    Limb alert care status     no BP/IV left arm    Neuropathy     Obesity     Osteomyelitis (HCC)     last assessed 11/4/16-per son not currently    PVC's (premature ventricular contractions)     sees [facility]    Stroke (HCC)     last weeof July 2018 [facility]    TIA (transient ischemic attack) 10/28/2018    Wears dentures     Wears glasses

## 2024-10-22 NOTE — PROGRESS NOTES
"Daily Note     Today's date: 10/22/2024  Patient name: Asad Galloway  : 1960  MRN: [id number]  Referring provider: Beatriz Hung PA-C  Dx:   Encounter Diagnoses   Name Primary?    History of CVA (cerebrovascular accident) Yes    Cognitive deficits                   PLAN OF CARE START: 24  PLAN OF CARE END: 24  FREQUENCY: 2-3x/week  PRECAUTIONS dialysis 3x/week, falls, terrified of dogs (do not let him see Omega at all), primary language is Armenian but fluent in English; DONT GIVE ANY FLUIDS OR FOODS    Subjective: \"I love him so much\"- tearing up regarding baby grandson    Objective: See treatment below.      TA:   -Sequencing, separate the words performed with focus on sustained attn, logical reasoning, sequencing and .  Requires min cues throughout.  Completes 5, remainder sent home to complete as HEP  -Wooden intelligence performed with pt copying design provided.  Focus on following 1-2 step directions, sustained attn, spatial relationships and color identification.  Requires min cues for one step direction following and min-mod cues for .  Significantly inc time, ~60% accurate color identification  -Sequencing steps of functional tasks performed with focus on logical reasoning, sequencing, attention.  Requires min cues for all aspects.  Completes 3, sent remainder to complete at home    Assessment: Pt tolerated session well. Pt presents with wife and is significanlty calmer and more agreeable throughout session.  Demonstrating improved sustained attention and direction following.  Pt is to continue to benefit from continued OT to maximize functional performance and QOL s/p CVA.    Plan: Continued skilled OT per POC.    New Goals added :   Pt will demonstrate improved sustained attention as evidenced by completing TM part A within 1 minute with 0 verbal cues.   Pt will demonstrate improved FMC as evidenced by improving time on 9-hole peg test by 8 seconds with each hand.     Goals " added 9/24:  Pt will demonstrate improved R sided peripersonal attention by locating items on b/l sides of table with minimally inc time for items on R>L and no cues.

## 2024-10-29 ENCOUNTER — OFFICE VISIT (OUTPATIENT)
Facility: CLINIC | Age: 64
End: 2024-10-29
Payer: MEDICARE

## 2024-10-29 DIAGNOSIS — R42 DIZZINESS: ICD-10-CM

## 2024-10-29 DIAGNOSIS — Z86.73 HISTORY OF CVA (CEREBROVASCULAR ACCIDENT): Primary | ICD-10-CM

## 2024-10-29 DIAGNOSIS — R41.89 COGNITIVE DEFICITS: ICD-10-CM

## 2024-10-29 DIAGNOSIS — R53.81 PHYSICAL DECONDITIONING: ICD-10-CM

## 2024-10-29 DIAGNOSIS — R26.89 POOR BALANCE: Primary | ICD-10-CM

## 2024-10-29 DIAGNOSIS — E11.42 DIABETIC POLYNEUROPATHY ASSOCIATED WITH TYPE 2 DIABETES MELLITUS (HCC): ICD-10-CM

## 2024-10-29 PROCEDURE — 97530 THERAPEUTIC ACTIVITIES: CPT | Performed by: OCCUPATIONAL THERAPIST

## 2024-10-29 PROCEDURE — 97112 NEUROMUSCULAR REEDUCATION: CPT | Performed by: OCCUPATIONAL THERAPIST

## 2024-10-29 PROCEDURE — 97110 THERAPEUTIC EXERCISES: CPT

## 2024-10-29 PROCEDURE — 97112 NEUROMUSCULAR REEDUCATION: CPT

## 2024-10-29 NOTE — PROGRESS NOTES
Daily Note     Today's date: 10/29/2024  Patient name: Asad Galloway  : 1960  MRN: [id number]  Referring provider: Beatriz Hung PA-C  Dx:   Encounter Diagnoses   Name Primary?    History of CVA (cerebrovascular accident) Yes    Cognitive deficits     Dizziness                   PLAN OF CARE START: 24  PLAN OF CARE END: 24  FREQUENCY: 2-3x/week  PRECAUTIONS dialysis 3x/week, falls, terrified of dogs (do not let him see Omega at all), primary language is [place]ovan but fluent in English; DONT GIVE ANY FLUIDS OR FOODS    Subjective: Pt's wife reports pt is doing better.    Objective: See treatment below.      TA/NMR:   -Wooden sudoku setup following solved prompt focusing on sustained attention, FMC. Completed 2 rows correctly without cues, then needed verbal/visual cues to maintain place in sequence. Pieces placed with R hand.  -Spot it numbers/shapes version completed to address sustained attention. Required grading down by covering half of 1 card initially, progressed to 0-min cues for each pair and extra time.  -Magnetic parquetry x3 images completed to address  skills and FMC. Required min-mod cues and extra time.    Assessment: Pt tolerated session well. Pt's wife present throughout session, and pt's participation and behavior were good.  Demonstrating improved sustained attention and direction following.  Pt is to continue to benefit from continued OT to maximize functional performance and QOL s/p CVA.    Plan: Continued skilled OT per POC.    New Goals added :   Pt will demonstrate improved sustained attention as evidenced by completing TM part A within 1 minute with 0 verbal cues.   Pt will demonstrate improved FMC as evidenced by improving time on 9-hole peg test by 8 seconds with each hand.     Goals added :  Pt will demonstrate improved R sided peripersonal attention by locating items on b/l sides of table with minimally inc time for items on R>L and no cues.

## 2024-10-29 NOTE — PROGRESS NOTES
Daily Note     Today's date: 10/29/2024  Patient name: Asad Galloway  : 1960  MRN: [id number]  Referring provider: Raj Auguste DO   Dx:   Encounter Diagnosis     ICD-10-CM    1. Poor balance  R26.89       2. Diabetic polyneuropathy associated with type 2 diabetes mellitus (HCC)  E11.42       3. Physical deconditioning  R53.81                   POC expires Unit limit Auth Expiration date PT/OT + Visit Limit? Co-Insurance   24   Bomn primary, 30pcy secondary Yes                                   PATIENT IS AFRAID OF DOGS    FLUID RESTRICTION, AND DO NOT GIVE PATIENT FOOD (PER WIFES REQUEST DUE TO SWALLOWING ISSUES)    Subjective: Patient presents to treatment with wife no new changes, complaints, or falls    Objective: See treatment diary below    TA:   Vitals:   BP: 144/58 mmHg   HR: 64 bpm  SpO2: 99%    - STS w/ foam pad on chair to fatigue, 10x (2 UE), 5 x (0 UE)  - Hip add iso ball squeezes: 2 minute   - LAQs: 1 minute  - High knee marchin ft   - Seated marchin min   - Walking fwd on foam beam and squat on foam pad: 2 min   - Side stepping on foam beam: 4 laps   - Stepping up and down on foam beam: 10x   - Seated trunk rotations 5.5# TT: 1 min  - Ambulation with 10# TT: 50 ft     Assessment: Patient tolerated treatment well with improved behavior this session. Patient required verbal cueing for forward trunk lean as he illustrated significant retropulsion; this improved with repeated reps, suggesting good carryover and motor learning. Most challenged with side stepping on foam beam as noted by use of stepping strategy backwards and maxAx1 to maintain balanced. Vitals WNL during today's session. would benefit from continued PT to reduce fall risk and maximize safety with all functional mobility.       Plan: Continue per plan of care.         Outcome Measures Initial Eval  24 Re-eval  10/10/24       5xSTS 15.85 sec, 2 UE  15.41 sec, 2 UE        TUG  - Regular  - Cognitive   17.3 sec, no  AD  24.3 sec, (serial 3s)   12.59 sec, no AD  18.56 sec, no AD (serial 3s)       10 meter   1.18 m/s without AD   0.86 m/s with cane       BURGER 30/56 33/56       mCTSIB  - FTEO (firm)  - FTEC (firm)  - FTEO (foam)  - FTEC (foam)   30 sec +  30 ++  2 sec  0 sec   30 sec+  30 sec+  3 sec  0 sec       6MWT 100 ft (attempted but only able to complete 1 minute) 275 ft in 4 minutes        ABC 47.5% 81.88%                           Precautions: falls   Past Medical History:   Diagnosis Date    Cerebrovascular accident (CVA) due to thrombosis of left middle cerebral artery (HCC) 07/29/2018    Chronic kidney disease     Coronary artery disease     Diabetes mellitus (HCC)     not on meds    Dialysis patient (HCC)     M-W-F    Fistula     left upper arm for hemodialyis    GERD (gastroesophageal reflux disease)     History of coronary artery stent placement     x3    Hypercholesteremia     Hyperlipidemia     Hypertension     Infectious viral hepatitis     B as child    Limb alert care status     no BP/IV left arm    Neuropathy     Obesity     Osteomyelitis (HCC)     last assessed 11/4/16-per son not currently    PVC's (premature ventricular contractions)     sees [facility]    Stroke (HCC)     last weeof July 2018 [facility]    TIA (transient ischemic attack) 10/28/2018    Wears dentures     Wears glasses

## 2024-11-05 ENCOUNTER — OFFICE VISIT (OUTPATIENT)
Facility: CLINIC | Age: 64
End: 2024-11-05
Payer: MEDICARE

## 2024-11-05 DIAGNOSIS — R26.89 POOR BALANCE: ICD-10-CM

## 2024-11-05 DIAGNOSIS — E11.42 DIABETIC POLYNEUROPATHY ASSOCIATED WITH TYPE 2 DIABETES MELLITUS (HCC): ICD-10-CM

## 2024-11-05 DIAGNOSIS — R41.89 COGNITIVE DEFICITS: ICD-10-CM

## 2024-11-05 DIAGNOSIS — R53.81 PHYSICAL DECONDITIONING: Primary | ICD-10-CM

## 2024-11-05 DIAGNOSIS — Z86.73 HISTORY OF CVA (CEREBROVASCULAR ACCIDENT): Primary | ICD-10-CM

## 2024-11-05 PROCEDURE — 97530 THERAPEUTIC ACTIVITIES: CPT

## 2024-11-05 PROCEDURE — 97112 NEUROMUSCULAR REEDUCATION: CPT

## 2024-11-05 NOTE — PROGRESS NOTES
Daily Note     Today's date: 2024  Patient name: Asad Galloway  : 1960  MRN: [id number]  Referring provider: Beatriz Hung PA-C  Dx:   Encounter Diagnoses   Name Primary?    History of CVA (cerebrovascular accident) Yes    Cognitive deficits          Start Time: 1715  Stop Time: 1800  Total time in clinic (min): 45 minutes    PLAN OF CARE START: 24  PLAN OF CARE END: 24  FREQUENCY: 2-3x/week  PRECAUTIONS dialysis 3x/week, falls, terrified of dogs (do not let him see [employer] at all), primary language is [place] but fluent in English; DONT GIVE ANY FLUIDS OR FOODS    Subjective: Pt reports being lightheaded at start of session.  Presents with wife who requests that he have juice and a snack.  Provided OJ, pretzels and soft granola bar which were approved by pt's wife.    Objective: See treatment below.      TA/NMR:   30 piece puzzle performed with focus on sustained attn, spatial relationships, logical reasoning.  Requires mod cues throughout, repetition of directions (lift pieces to insert)    Pegbrite activity activity initiated with focus on FMC, , sustained attn.  Requires mod cues.  Drops 3/5 pieces and requires cues for color identification.    Assessment: Pt tolerated session well. Pt's wife present throughout session, and pt's participation and behavior were good.  Demonstrating improved sustained attention and direction following.  Pt is to continue to benefit from continued OT to maximize functional performance and QOL s/p CVA.    Plan: Continued skilled OT per POC.    New Goals added :   Pt will demonstrate improved sustained attention as evidenced by completing TM part A within 1 minute with 0 verbal cues.   Pt will demonstrate improved FMC as evidenced by improving time on 9-hole peg test by 8 seconds with each hand.     Goals added :  Pt will demonstrate improved R sided peripersonal attention by locating items on b/l sides of table with minimally inc time for items  on R>L and no cues.

## 2024-11-05 NOTE — PROGRESS NOTES
Daily Note     Today's date: 2024  Patient name: Asad Galloway  : 1960  MRN: [id number]  Referring provider: Raj Auguste DO   Dx:   Encounter Diagnosis     ICD-10-CM    1. Physical deconditioning  R53.81       2. Poor balance  R26.89       3. Diabetic polyneuropathy associated with type 2 diabetes mellitus (HCC)  E11.42                   POC expires Unit limit Auth Expiration date PT/OT + Visit Limit? Co-Insurance   24   Bomn primary, 30pcy secondary Yes                                   PATIENT IS AFRAID OF DOGS    FLUID RESTRICTION, AND DO NOT GIVE PATIENT FOOD (PER WIFES REQUEST DUE TO SWALLOWING ISSUES)    Subjective: Patient presents to treatment with wife no new changes, complaints, or falls.     Objective: See treatment diary below    TA:   Vitals:   BP: 95/59 mmHg, 95/54 mmHg, 112/68 mmHg (given 2 ounces of water, ok per wife whom was present)   HR: 66 bpm  SpO2: 99%    - STS w/ 2 UE x 4 (significant retropulsion, reported dizziness)    Not today:   - Hip add iso ball squeezes: 2 minute   - LAQs: 1 minute  - High knee marchin ft   - Seated marchin min   - Walking fwd on foam beam and squat on foam pad: 2 min   - Side stepping on foam beam: 4 laps   - Stepping up and down on foam beam: 10x   - Seated trunk rotations 5.5# TT: 1 min  - Ambulation with 10# TT: 50 ft     Assessment: Patient tolerated treatment poorly due to reports of dizziness. Upon arrival, patient with low BP. Attempted to perform 10 STS, however patient with significant retropulsion and subjective reports of dizziness. BP remained low, therefore terminated therapy early. Per wife, he was allowed to drink 2 ounces of water; was given water with improvement in BP. He would benefit from continued PT to reduce fall risk and maximize safety with all functional mobility.       Plan: Continue per plan of care.         Outcome Measures Initial Eval  24 Re-eval  10/10/24       5xSTS 15.85 sec, 2 UE  15.41 sec, 2 UE         TUG  - Regular  - Cognitive   17.3 sec, no AD  24.3 sec, (serial 3s)   12.59 sec, no AD  18.56 sec, no AD (serial 3s)       10 meter   1.18 m/s without AD   0.86 m/s with cane       BURGER 30/56 33/56       mCTSIB  - FTEO (firm)  - FTEC (firm)  - FTEO (foam)  - FTEC (foam)   30 sec +  30 ++  2 sec  0 sec   30 sec+  30 sec+  3 sec  0 sec       6MWT 100 ft (attempted but only able to complete 1 minute) 275 ft in 4 minutes        ABC 47.5% 81.88%                           Precautions: falls   Past Medical History:   Diagnosis Date    Cerebrovascular accident (CVA) due to thrombosis of left middle cerebral artery (HCC) 07/29/2018    Chronic kidney disease     Coronary artery disease     Diabetes mellitus (HCC)     not on meds    Dialysis patient (HCC)     M-W-F    Fistula     left upper arm for hemodialyis    GERD (gastroesophageal reflux disease)     History of coronary artery stent placement     x3    Hypercholesteremia     Hyperlipidemia     Hypertension     Infectious viral hepatitis     B as child    Limb alert care status     no BP/IV left arm    Neuropathy     Obesity     Osteomyelitis (HCC)     last assessed 11/4/16-per son not currently    PVC's (premature ventricular contractions)     sees [facility]    Stroke (HCC)     last weeof July 2018 [facility]    TIA (transient ischemic attack) 10/28/2018    Wears dentures     Wears glasses

## 2024-11-07 ENCOUNTER — HOSPITAL ENCOUNTER (OUTPATIENT)
Dept: VASCULAR ULTRASOUND | Facility: HOSPITAL | Age: 64
Discharge: HOME | End: 2024-11-07
Payer: MEDICARE

## 2024-11-07 ENCOUNTER — OFFICE VISIT (OUTPATIENT)
Dept: FAMILY MEDICINE CLINIC | Facility: CLINIC | Age: 64
End: 2024-11-07
Payer: MEDICARE

## 2024-11-07 ENCOUNTER — EVALUATION (OUTPATIENT)
Facility: CLINIC | Age: 64
End: 2024-11-07
Payer: MEDICARE

## 2024-11-07 VITALS
BODY MASS INDEX: 31.78 KG/M2 | HEART RATE: 69 BPM | SYSTOLIC BLOOD PRESSURE: 109 MMHG | OXYGEN SATURATION: 98 % | WEIGHT: 209 LBS | TEMPERATURE: 98 F | DIASTOLIC BLOOD PRESSURE: 53 MMHG

## 2024-11-07 DIAGNOSIS — L03.012 PARONYCHIA OF FINGER OF LEFT HAND: Primary | ICD-10-CM

## 2024-11-07 DIAGNOSIS — E11.9 CONTROLLED TYPE 2 DIABETES MELLITUS WITHOUT COMPLICATION, WITHOUT LONG-TERM CURRENT USE OF INSULIN (HCC): ICD-10-CM

## 2024-11-07 DIAGNOSIS — R53.81 PHYSICAL DECONDITIONING: ICD-10-CM

## 2024-11-07 DIAGNOSIS — I82.409 DVT (DEEP VENOUS THROMBOSIS) (HCC): ICD-10-CM

## 2024-11-07 DIAGNOSIS — R26.89 POOR BALANCE: Primary | ICD-10-CM

## 2024-11-07 DIAGNOSIS — I82.451 ACUTE EMBOLISM AND THROMBOSIS OF RIGHT PERONEAL VEIN (HCC): ICD-10-CM

## 2024-11-07 LAB — SL AMB POCT HEMOGLOBIN AIC: 5.5 (ref ?–6.5)

## 2024-11-07 PROCEDURE — 99214 OFFICE O/P EST MOD 30 MIN: CPT

## 2024-11-07 PROCEDURE — 93971 EXTREMITY STUDY: CPT

## 2024-11-07 PROCEDURE — 93971 EXTREMITY STUDY: CPT | Performed by: SURGERY

## 2024-11-07 PROCEDURE — 83036 HEMOGLOBIN GLYCOSYLATED A1C: CPT

## 2024-11-07 PROCEDURE — 97530 THERAPEUTIC ACTIVITIES: CPT

## 2024-11-07 RX ORDER — CEPHALEXIN 500 MG/1
500 CAPSULE ORAL EVERY 12 HOURS SCHEDULED
Qty: 10 CAPSULE | Refills: 0 | Status: SHIPPED | OUTPATIENT
Start: 2024-11-07 | End: 2024-11-12

## 2024-11-07 RX ORDER — MUPIROCIN 20 MG/G
OINTMENT TOPICAL 2 TIMES DAILY
Qty: 30 G | Refills: 0 | Status: SHIPPED | OUTPATIENT
Start: 2024-11-07

## 2024-11-07 NOTE — PROGRESS NOTES
Ambulatory Visit  Name: Asad Galloway      : 1960      MRN: [id number]  Encounter Provider: BRITTANY Allen  Encounter Date: 2024   Encounter department: [facility]    Assessment & Plan  Paronychia of finger of left hand  L 3rd finger proximal and lateral skin with erythema and swelling. No drainage noted however son reports green drainage earlier today. Pt denies pain, fever. Recommend warm water soaks 2-3 x day, apply mupirocin ointment bid and cephalexin 500 mg bid x 5 days.     Orders:    mupirocin (BACTROBAN) 2 % ointment; Apply topically 2 (two) times a day    cephalexin (KEFLEX) 500 mg capsule; Take 1 capsule (500 mg total) by mouth every 12 (twelve) hours for 5 days    Controlled type 2 diabetes mellitus without complication, without long-term current use of insulin ([facility])    Lab Results   Component Value Date    HGBA1C 5.5 2024   Blood sugars controlled, pt not on medication continue diabetic diet. DM foot exam completed, per son pt foll with ophthalmology and will provide copy of DM eye exam.     Orders:    POCT hemoglobin A1c       History of Present Illness     Pt and son presents with c/o finger on L hand with swelling and purulent drainage. Pt has hx of diabetes with ulcer L heel per son took several months to heal, ESRD on dialysis.         History obtained from : patient  Review of Systems   Constitutional:  Negative for activity change, appetite change, chills, diaphoresis, fatigue, fever and unexpected weight change.   HENT:  Negative for congestion, dental problem, drooling, ear discharge, ear pain, facial swelling, hearing loss, mouth sores, nosebleeds, postnasal drip, rhinorrhea, sinus pressure, sinus pain, sneezing, sore throat, tinnitus, trouble swallowing and voice change.    Eyes:  Negative for photophobia, pain, discharge, redness, itching and visual disturbance.   Respiratory:  Negative for apnea, cough, choking, chest tightness,  shortness of breath, wheezing and stridor.    Cardiovascular:  Negative for chest pain, palpitations and leg swelling.   Gastrointestinal:  Negative for abdominal distention, abdominal pain, anal bleeding, blood in stool, constipation, diarrhea, nausea and vomiting.   Endocrine: Negative for cold intolerance, heat intolerance, polydipsia, polyphagia and polyuria.   Genitourinary:  Negative for decreased urine volume, difficulty urinating, dysuria, flank pain, frequency, hematuria, penile discharge, scrotal swelling, testicular pain and urgency.   Musculoskeletal:  Negative for arthralgias, back pain, gait problem, joint swelling, myalgias, neck pain and neck stiffness.   Skin:  Negative for color change, rash and wound.   Allergic/Immunologic: Negative for environmental allergies, food allergies and immunocompromised state.   Neurological:  Negative for dizziness, tremors, seizures, syncope, facial asymmetry, weakness, light-headedness, numbness and headaches.   Hematological:  Negative for adenopathy. Does not bruise/bleed easily.   Psychiatric/Behavioral:  Negative for agitation, behavioral problems, confusion, decreased concentration, dysphoric mood, hallucinations, self-injury, sleep disturbance and suicidal ideas. The patient is not nervous/anxious and is not hyperactive.    All other systems reviewed and are negative.    Current Outpatient Medications on File Prior to Visit   Medication Sig Dispense Refill    apixaban (ELIQUIS) 5 mg Take 1 tablet (5 mg total) by mouth 2 (two) times a day 60 tablet 3    atorvastatin (LIPITOR) 40 mg tablet TAKE 1 TABLET BY MOUTH DAILY WITH DINNER 90 tablet 1    calcitriol (ROCALTROL) 0.5 MCG capsule Take 1 capsule (0.5 mcg total) by mouth 3 (three) times a week 12 capsule 0    cinacalcet (SENSIPAR) 60 MG tablet Take 2 tablets (120 mg total) by mouth 3 (three) times a week 24 tablet 0    divalproex sodium (DEPAKOTE SPRINKLE) 125 MG capsule Take 2 capsules (250 mg total) by mouth  every 12 (twelve) hours 360 capsule 1    metoprolol succinate (TOPROL-XL) 25 mg 24 hr tablet TAKE 1 TABLET BY MOUTH TWICE A  tablet 1    MIDODRINE HCL PO Take 5 mg by mouth PRN w/ dialysis      prasugrel (EFFIENT) tablet Take 1 tablet (5 mg total) by mouth daily 90 tablet 1    sacubitril-valsartan (Entresto) 24-26 MG TABS Take 1 tablet by mouth in the morning 30 tablet 5    Sevelamer Carbonate 2.4 g PACK VIGOROUSLY MIX CONTENTS OF 1 PACKET IN WATER (IT WILL NOT DISSOLVE) AND DRINK 3 TIMES DAILY WITH MEALS      Vitamin D3 1.25 MG (48276 UT) capsule TAKE 1 CAPSULE BY MOUTH ONE TIME PER WEEK 12 capsule 4    [DISCONTINUED] meclizine (ANTIVERT) 12.5 MG tablet Take 1 tablet (12.5 mg total) by mouth every 8 (eight) hours as needed for dizziness 30 tablet 0    [DISCONTINUED] melatonin 3 mg Take 1 tablet (3 mg total) by mouth daily at bedtime 30 tablet 0     No current facility-administered medications on file prior to visit.      Social History     Tobacco Use    Smoking status: Never    Smokeless tobacco: Never   Vaping Use    Vaping status: Never Used   Substance and Sexual Activity    Alcohol use: Never    Drug use: No    Sexual activity: Not Currently     Partners: Female     Comment: defer         Objective     /53 (BP Location: Right arm, Patient Position: Sitting, Cuff Size: Standard)   Pulse 69   Temp 98 °F (36.7 °C)   Wt 94.8 kg (209 lb)   SpO2 98%   BMI 31.78 kg/m²     Physical Exam  Vitals and nursing note reviewed.   Constitutional:       General: He is not in acute distress.     Appearance: Normal appearance. He is not ill-appearing, toxic-appearing or diaphoretic.   HENT:      Head: Normocephalic and atraumatic.      Right Ear: Tympanic membrane, ear canal and external ear normal. There is no impacted cerumen.      Left Ear: Tympanic membrane, ear canal and external ear normal. There is no impacted cerumen.      Nose: Nose normal. No congestion or rhinorrhea.      Mouth/Throat:      Mouth:  Mucous membranes are moist.      Pharynx: No oropharyngeal exudate or posterior oropharyngeal erythema.   Eyes:      General: No scleral icterus.        Right eye: No discharge.         Left eye: No discharge.      Extraocular Movements: Extraocular movements intact.      Conjunctiva/sclera: Conjunctivae normal.      Pupils: Pupils are equal, round, and reactive to light.   Neck:      Vascular: No carotid bruit.   Cardiovascular:      Rate and Rhythm: Normal rate and regular rhythm.      Pulses: Normal pulses. no weak pulses.           Dorsalis pedis pulses are 2+ on the right side and 2+ on the left side.        Posterior tibial pulses are 2+ on the right side and 2+ on the left side.      Heart sounds: Normal heart sounds. No murmur heard.  Pulmonary:      Effort: Pulmonary effort is normal. No respiratory distress.      Breath sounds: Normal breath sounds. No stridor. No wheezing, rhonchi or rales.   Chest:      Chest wall: No tenderness.   Abdominal:      General: Bowel sounds are normal. There is no distension.      Palpations: Abdomen is soft. There is no mass.      Tenderness: There is no abdominal tenderness. There is no right CVA tenderness, left CVA tenderness, guarding or rebound.      Hernia: No hernia is present.   Musculoskeletal:         General: No swelling, tenderness, deformity or signs of injury. Normal range of motion.      Left hand: No swelling, deformity, lacerations, tenderness or bony tenderness. Normal range of motion. Normal strength. Normal sensation. There is no disruption of two-point discrimination. Normal capillary refill. Normal pulse.      Cervical back: Normal range of motion and neck supple. No rigidity or tenderness.      Right lower leg: No edema.      Left lower leg: No edema.      Comments: 3rd digit swelling and inflammation around nailbed   Feet:      Right foot:      Skin integrity: Dry skin present. No ulcer, skin breakdown, erythema, warmth or callus.      Left foot:       Skin integrity: No ulcer, skin breakdown, erythema, warmth, callus or dry skin.   Lymphadenopathy:      Cervical: No cervical adenopathy.   Skin:     General: Skin is warm.      Capillary Refill: Capillary refill takes less than 2 seconds.      Coloration: Skin is not jaundiced.      Findings: Erythema present. No bruising, lesion or rash.      Comments: L hand 3rd digit nailbed proximal and lateral skin with erythema and mild swelling   Neurological:      General: No focal deficit present.      Mental Status: He is alert and oriented to person, place, and time.      Cranial Nerves: No cranial nerve deficit.      Sensory: No sensory deficit.      Motor: No weakness.      Coordination: Coordination normal.      Gait: Gait normal.      Deep Tendon Reflexes: Reflexes normal.   Psychiatric:         Mood and Affect: Mood normal.         Behavior: Behavior normal.         Thought Content: Thought content normal.         Judgment: Judgment normal.     Diabetic Foot Exam    Patient's shoes and socks removed.    Right Foot/Ankle   Right Foot Inspection  Skin Exam: skin normal, skin intact and dry skin. No warmth, no callus, no erythema, no maceration, no abnormal color, no pre-ulcer, no ulcer and no callus.     Toe Exam: ROM and strength within normal limits and right toe deformity (absence of toe).     Sensory   Monofilament testing: intact    Vascular  Capillary refills: < 3 seconds  The right DP pulse is 2+. The right PT pulse is 2+.     Left Foot/Ankle  Left Foot Inspection  Skin Exam: skin normal and skin intact. No dry skin, no warmth, no erythema, no maceration, normal color, no pre-ulcer, no ulcer and no callus.     Toe Exam: ROM and strength within normal limits.     Sensory   Monofilament testing: intact    Vascular  Capillary refills: < 3 seconds  The left DP pulse is 2+. The left PT pulse is 2+.     Assign Risk Category  Deformity present  No loss of protective sensation  No weak pulses  Risk: 0

## 2024-11-07 NOTE — LETTER
2024    Raj Auguste DO  [address] [address]    Patient: Asad Galloway   YOB: 1960   Date of Visit: 2024     Encounter Diagnosis     ICD-10-CM    1. Poor balance  R26.89       2. Physical deconditioning  R53.81           Dear Dr. Auguste:    Thank you for your recent referral of Asad Galloway. Please review the attached evaluation summary from Asad's recent visit.     Please verify that you agree with the plan of care by signing the attached order.     If you have any questions or concerns, please do not hesitate to call.     I sincerely appreciate the opportunity to share in the care of one of your patients and hope to have another opportunity to work with you in the near future.       Sincerely,    Kinza Boyle, PT      Referring Provider:      I certify that I have read the below Plan of Care and certify the need for these services furnished under this plan of treatment while under my care.                    Raj Auguste DO  [address] [address]  Via In Basket                                                                              PT Re-Evaluation          POC expires Unit limit Auth Expiration date PT/OT + Visit Limit? Co-Insurance   24   [facility] primary, 30pcy secondary Yes                                            Today's date: 2024  Patient name: Asad Galloway  : 1960  MRN: [id number]  Referring provider: Raj Auguste DO  Dx:   Encounter Diagnosis     ICD-10-CM    1. Poor balance  R26.89       2. Physical deconditioning  R53.81               Assessment  Assessment details: Patient is a 64 y.o. male who presents to skilled PT for impaired balance, general deconditioning with multiple comorbidiites including hx of CVA, CKD on HD. Patient has been participating in skilled PT tx at this clinic for ~ 2 months. Since his last re-assessment patient has made progress in BURGER, suggesting  improvements in static balance. Mild, non-significant regressions noted in TUG cog. Regressions noted in 5 x STS and TUG cog, however this may be due to the fact that patient pushed off of his thighs vs arm rests. Regression also noted in 6 MWT as he was only able to ambulate for 2 minutes. Despite decreased time walked, he was able to ambulate for a longer distance in a shorter amount of time without his cane, which illustrates improvement in CV endurance. Although patient gait speed decreased, he did not require use of cane to this date. He categorizes as a Limited Community Ambulator, per APTA and Rehab Measures Cutoff scores. Gait abnormalities continue to demonstrate M/L instability, decreased propulsion, stance time and swing. Balance confidence decreased by ~9%. Patient behavior has improved significantly since his wife has been present in therapy session. He has not met any goals at this time, however continues to work towards remaining goals. Patient will continue to benefit from skilled PT to address noted impairments and functional limitations they are causing with overall goal to return patient to highest level possible with reduced risk for falls.         Please contact me if you have any questions or recommendations. Thank you for the referral and the opportunity to share in Asad Galloway's care.      Cut off score   All date taken from APTA Neuro Section or Rehab Measures    BURGER test: 46/56                                              5 x STS Test:  MDC: 6 points                                                  MDC: 2.3 seconds   age norms                                                                 Age Norms   60-69 year old = M: 55, F: 55                        60-69 year old: 11.4 seconds   70-79 year old = M 54,  F: 53                       70-79 year old: 12.6 seconds     80-89 year old = M53,   F: 50                       80-89 year old: 14.8 seconds      TUG test:                                                                      10 Meter Walk Test:  MDC: 4.14 seconds       MDC: .59 ft/sec  Cut off score for Falls                                                  Age Norms  > 13.5 seconds community dwelling adults                20-29; M: 4.56 ft/sec F: 4.62 ft/sec  > 32.2 Frail Elderly                                                     30-39: M 4.76 ft/sec  F: 4.68 ft/sec          40-49: M: 4.79 ft/sec  F: 4.62 ft/sec  6 Minute Walk Test      50-59: M: 4.76 ft/sec  F: 4.56 ft/sec  MDC: 190 feet       60-69: M: 4.56 ft/sec  F: 4.26 ft/sec  Age Norms       70-+    M: 4.36 ft/sec  F: 4.16 ft/sec  60-69:    M: 1876 F: 1765  70-79:    M: 1729 F: 1545    FGA:  80-89 +: M: 1368 F; 1286       MCID: 4 points            Geriatrics/ Community Dwelling Older Adults: </= 22/30 fall risk            Geriatrics/ Community Dwelling Older Adults: </= 20/30 unexplained falls in the next 6 months            Parkinsons: </= 18/30 fall risk      Patient verbalized understanding of POC          Impairments: Abnormal coordination, Abnormal gait, Abnormal muscle tone, Abnormal or restricted ROM, Activity intolerance, Impaired balance, Impaired physical strength, Lacks appropriate HEP, Poor posture, Poor body mechanics, Pain with function, Safety issue, Weight-bearing intolerance, Abnormal movement, Difficulty understanding, Abnormal muscle firing  Understanding of Dx/Px/POC: Excellent  Prognosis: Excellent    Patient verbalized understanding of POC.         Please contact me if you have any questions or recommendations. Thank you for the referral and the opportunity to share in Asad JACOB Galloway's care.        Plan  Plan details: complete testing, balance and functional strength/endurance re-training  Patient would benefit from: PT Eval and Skilled PT  Planned modality interventions: Biofeedback, Cryotherapy, TENS, Thermotherapy  Planned therapy interventions: Abdominal trunk stabilization, ADL training, Balance, Balance/WB  training, Breathing training, Body mechanics training, Coordination, Functional ROM exercises, Gait training, HEP, Joint Mobilization, Manual Therapy, Griggs taping, Motor coordination training, Neuromuscular re-education, Patient education, Postural training, Strengthening, Stretching, Therapeutic activities, Therapeutic exercises, Therapeutic training, Transfer training, Activity modification, Work reintegration  Frequency: 2x/wk  Duration in weeks: 12  Plan of Care beginning date: 11/7/24  Plan of Care expiration date: 12 weeks - 1/30/25  Treatment plan discussed with: Patient       Goals  Short Term Goals (4 weeks):    - Patient will improve time on TUG by 2.9 seconds to facilitate improved safety in all ambulation MET  - Patient will be independent in basic HEP 2-3 weeks  - Patient will improve 5xSTS score by 2.3 seconds to promote improved LE functional strength needed for ADLs- NM       Long Term Goals (12 weeks):  - Patient will be independent in a comprehensive home exercise program- NM   - Patient will improve scoring on DGI by 2.6 points to progress safety-NA  - Patient will improve gait speed by 0.18 m/s to improve safety with community ambulation-NM  - Patient will improve BURGER by 6 points in order to improve static balance and reduce risk for falls-NM  - Patient will improve scoring on FGA by 4 points to progress safety with dynamic tasks-NA  - Patient will be able to demonstrate HT in gait without veering-NM  - Patient will improve 6 Minute Walk Test score by 190 feet to promote improved cardiovascular endurance-NM  - Patient will report 50% reduction in near falls in order to improve safety with functional tasks and reduce his risk for falls-ONGOING  - Patient will report going on walks at least 3 days per week to promote independence and improved cardiovascular endurance-NM  - Patient will be able to ascend/descend stairs reciprocally with 1 UE assist to promote independence and safety with  ADLs-NM  - Patient will report 50% reduction in near falls when ambulating on uneven terrain-NM       Cut off score    All date taken from APTA Neuro Section or Rehab Measures      [place]/56  MDC: 6 pts  Age Norms:  60-69: M - 55   F - 55  70-79: M - 54   F - 53  80-89: M - 53   F - 50 5xSTS: June et al 2010  MDC: 2.3 sec  Age Norms:  60-69: 11.4 sec  70-79: 12.6 sec  80-89: 14.8 sec   TUG  MDC: 4.14 sec  Cut off score:  >13.5 sec community dwelling adults  >32.2 frail elderly  <20 I for basic transfers  >30 dependent on transfers 10 Meter Walk Test: Joyce et al 2011  MDC: 0.18 m/s  20-29: M - 1.35 m   F - 1.34 m  30-39: M - 1.43 m   F - 1.34 m  40-49: M - 1.43 m   F - 1.39 m  50-59: M - 1.43 m   F - 1.31 m  60-69: M - 1.34 m   F - 1.24 m  70-79: M - 1.26 m   F - 1.13 m  80-89: M - 0.97 m   F - 0.94 m    Household Ambulator < 0.4 m/s  Limited Community Ambulator 0.4 - 0.8 m/s  Community Ambulator 0.8 - 1.2 m/s  Safely cross the street > 1.2 m/s   FGA  MCID: 4 pts  Geriatrics/community < 22/30 fall risk  Geriatrics/community < 20/30 unexplained falls    DGI  MDC: vestibular - 4 pts  MDC: geriatric/community - 3 pts  Falls risk <19/24 mCTSIB  Norm: 20-60 yrs  Eyes open firm: norm sway 0.21-0.48  Eyes closed firm: norm sway 0.48-0.99  Eyes open foam: norm sway 0.38-0.71  Eyes closed foam: norm sway 0.70-2.22   6 Minute Walk Test  MDC: 190.98 ft  MCID: 164 ft    Age Norms  60-69: M - 1876 ft (571.80 m)  F - 1765 ft (537.98 m)  70-79: M - 1729 ft (527.00 m)  F - 1545 ft (470.92 m)  80-89: M - 1368 ft (416.97 m)  F - 1286 ft (391.97 m) ABC: Smith & Tre, 2003  <67% increased risk for falls   [place] Monofilaments  Evaluator Size:        Force (grams):          Hand/Dorsal Thresholds:        Plantar Thresholds:  - 1.65                       - 0.008                       - Normal                                 - Normal  - 2.36                       - 0.02                         - Normal                                  - Normal  - 2.44                       - 0.04                         - Normal                                 - Normal  - 2.83                       - 0.07                         - Normal                                 - Normal  - 3.22                       - 0.16                         - Diminished light touch          - Normal  - 3.61                       - 0.40                         - Diminished light touch          - Normal  - 3.84                       - 0.60                         - Diminished protective           - Diminished light touch  - 4.08                       - 1.00                         - Diminished protective           - Diminished light touch  - 4.17                       - 1.40                         - Diminished protective           - Diminished light touch  - 4.31                       - 2.00                         - Diminished protective           - Diminished light touch  - 4.56                       - 4.00                         - Loss of protective sense      - Diminished protective  - 4.74                       - 6.00                         - Loss of protective sense      - Diminished protective  - 4.93                       - 8.00                         - Loss of protective sense      - Diminished protective  - 5.07                       - 10.0                         - Loss of protective sense     - Loss of protective sense  - 5.18                       - 15.0                         - Loss of protective sense     - Loss of protective sense  - 5.46                       - 26.0                         - Loss of protective sense     - Loss of protective sense  - 5.88                       - 60.0                         - Loss of protective sense     - Loss of protective sense  - 6.10                       - 100                          - Loss of protective sense     - Loss of protective sense  - 6.45                       - 180                        "   - Loss of protective sense     - Loss of protective sense  - 6.65                       - 300                          - Deep pressure sense only  - Deep pressure sense only         Subjective    History of Present Illness  - Mechanism of injury: Patient was being tx at [facility] location; transitioned to [place] for neuro/balance PT and neuro OT. He has CKD on HD M, W, Fx 5 years ; recent visit to ED  fistula bleeding but resolved. He  has a past medical history of Cerebrovascular accident (CVA) due to thrombosis of left middle cerebral artery (HCC) (2018), Chronic kidney disease, Coronary artery disease, Diabetes mellitus (HCC), Dialysis patient ([facility]), Fistula, GERD (gastroesophageal reflux disease), History of coronary artery stent placement, Hypercholesteremia, Hyperlipidemia, Hypertension, Infectious viral hepatitis, Limb alert care status, Neuropathy, Obesity, Osteomyelitis ([facility]), PVC's (premature ventricular contractions), Stroke ([facility]), TIA (transient ischemic attack) (10/28/2018). He reports impaired balance and increased reliance on family for assist with ADLs; he wants to improve balance and independence    Update 10/10/24: Patient reports balance is improving with PT. He feels stronger    Updated 24: Patient reports that therapy has been \"beautiful.\" \"Everything has gotten better.\"     - Primary AD: cane intermittently   - Assist level at home: family assists him with ADLs  - Decreased fine motor tasks: No    Patient goal:  \"I want everything better \"     Pain  - Current pain ratin/10  - At best pain ratin/10  - At worst pain ratin/10  - Location: low back  - Aggravating factors: unsure     Social Support  - Steps to enter house: 3  - Stairs in house: yes but bedroom on 1st floor    - Lives in: multi story house   - Lives with: wife and 2 kids     - Employment status: retired   - Hand dominance: right    Treatments  - [facility] treatment[facility], [facility] to this " clinic  - Current treatment: initiating PT eval/ tx ; also sees neuro OT        Objective     UE SCreen    LE MMT  - R Hip Flexion: 3+/5  L Hip Flexion: 4-/5  - R Hip Abduction: 4-/5  L Hip Abduction: 4-/5  - R Hip Adduction: 4-/5  L Hip Adduction: 4-/5  - R Knee Extension: 4-/5  L Knee Extension: 4-/5  - R Ankle DF: 4/5   L Ankle DF: 4/5  - R Ankle PF: 4-/5   L Ankle PF: 4/5    Sensation  - Light touch: intact       Coordination  - Heel to Shin: impaired B/L  - Alternate Toe Taps: impaired B/L          Postural Screen  - Observation: forward head        Gait  - Abnormalities: ambulates without AD on eval, demos narrow JOSE DAVID, inconsistent step length, lateral instability         Outcome Measures Initial Eval  9/5/24 Re-eval  10/10/24 Re eval   11/7/24      5xSTS 15.85 sec, 2 UE  15.41 sec, 2 UE  21.06 sec, pushing off thighs       TUG  - Regular  - Cognitive   17.3 sec, no AD  24.3 sec, (serial 3s)   12.59 sec, no AD  18.56 sec, no AD (serial 3s)   16. 44 sec, pushing off thighs  19.74 sec, serial 3s      10 meter   1.18 m/s without AD   0.86 m/s with cane   0.71 m/s wo cane       JENNYFER 30/56 33/56 34/56      mCTSIB  - FTEO (firm)  - FTEC (firm)  - FTEO (foam)  - FTEC (foam)   30 sec +  30 ++  2 sec  0 sec   30 sec+  30 sec+  3 sec  0 sec   30 sec+  30 sec+  3 sec   0 sec      6MWT 100 ft (attempted but only able to complete 1 minute) 275 ft in 4 minutes  212 ft in 2 minutes w no cane       ABC 47.5% 81.88% 72.5%                          Precautions: falls   Past Medical History:   Diagnosis Date   • Cerebrovascular accident (CVA) due to thrombosis of left middle cerebral artery (HCC) 07/29/2018   • Chronic kidney disease    • Coronary artery disease    • Diabetes mellitus (HCC)     not on meds   • Dialysis patient ([facility])     M-W-F   • Fistula     left upper arm for hemodialyis   • GERD (gastroesophageal reflux disease)    • History of coronary artery stent placement     x3   • Hypercholesteremia    • Hyperlipidemia     • Hypertension    • Infectious viral hepatitis     B as child   • Limb alert care status     no BP/IV left arm   • Neuropathy    • Obesity    • Osteomyelitis (HCC)     last assessed 11/4/16-per son not currently   • PVC's (premature ventricular contractions)     sees  Cardio   • Stroke (HCC)     last weeof July 2018 [facility]   • TIA (transient ischemic attack) 10/28/2018   • Wears dentures    • Wears glasses

## 2024-11-07 NOTE — PROGRESS NOTES
PT Re-Evaluation          POC expires Unit limit Auth Expiration date PT/OT + Visit Limit? Co-Insurance   24   Bomn primary, 30pcy secondary Yes                                            Today's date: 2024  Patient name: Asad Galloway  : 1960  MRN: [id number]  Referring provider: Raj Auguste DO  Dx:   Encounter Diagnosis     ICD-10-CM    1. Poor balance  R26.89       2. Physical deconditioning  R53.81               Assessment  Assessment details: Patient is a 64 y.o. male who presents to skilled PT for impaired balance, general deconditioning with multiple comorbidiites including hx of CVA, CKD on HD. Patient has been participating in skilled PT tx at this clinic for ~ 2 months. Since his last re-assessment patient has made progress in BURGER, suggesting improvements in static balance. Mild, non-significant regressions noted in TUG cog. Regressions noted in 5 x STS and TUG cog, however this may be due to the fact that patient pushed off of his thighs vs arm rests. Regression also noted in 6 MWT as he was only able to ambulate for 2 minutes. Despite decreased time walked, he was able to ambulate for a longer distance in a shorter amount of time without his cane, which illustrates improvement in CV endurance. Although patient gait speed decreased, he did not require use of cane to this date. He categorizes as a Limited Community Ambulator, per APTA and Rehab Measures Cutoff scores. Gait abnormalities continue to demonstrate M/L instability, decreased propulsion, stance time and swing. Balance confidence decreased by ~9%. Patient behavior has improved significantly since his wife has been present in therapy session. He has not met any goals at this time, however continues to work towards remaining goals. Patient will continue to benefit from skilled PT to address noted impairments and functional limitations they are causing with  overall goal to return patient to highest level possible with reduced risk for falls.         Please contact me if you have any questions or recommendations. Thank you for the referral and the opportunity to share in Asad JACOB Galloway's care.      Cut off score   All date taken from APTA Neuro Section or Rehab Measures    BURGER test: 46/56                                              5 x STS Test:  MDC: 6 points                                                  MDC: 2.3 seconds   age norms                                                                 Age Norms   60-69 year old = M: 55, F: 55                        60-69 year old: 11.4 seconds   70-79 year old = M 54,  F: 53                       70-79 year old: 12.6 seconds     80-89 year old = M53,   F: 50                       80-89 year old: 14.8 seconds      TUG test:                                                                     10 Meter Walk Test:  MDC: 4.14 seconds       MDC: .59 ft/sec  Cut off score for Falls                                                  Age Norms  > 13.5 seconds community dwelling adults                20-29; M: 4.56 ft/sec F: 4.62 ft/sec  > 32.2 Frail Elderly                                                     30-39: M 4.76 ft/sec  F: 4.68 ft/sec          40-49: M: 4.79 ft/sec  F: 4.62 ft/sec  6 Minute Walk Test      50-59: M: 4.76 ft/sec  F: 4.56 ft/sec  MDC: 190 feet       60-69: M: 4.56 ft/sec  F: 4.26 ft/sec  Age Norms       70-+    M: 4.36 ft/sec  F: 4.16 ft/sec  60-69:    M: 1876 F: 1765  70-79:    M: 1729 F: 1545    FGA:  80-89 +: M: 1368 F; 1286       MCID: 4 points            Geriatrics/ Community Dwelling Older Adults: </= 22/30 fall risk            Geriatrics/ Community Dwelling Older Adults: </= 20/30 unexplained falls in the next 6 months            Parkinsons: </= 18/30 fall risk      Patient verbalized understanding of POC          Impairments: Abnormal coordination, Abnormal gait, Abnormal muscle tone, Abnormal or  restricted ROM, Activity intolerance, Impaired balance, Impaired physical strength, Lacks appropriate HEP, Poor posture, Poor body mechanics, Pain with function, Safety issue, Weight-bearing intolerance, Abnormal movement, Difficulty understanding, Abnormal muscle firing  Understanding of Dx/Px/POC: Excellent  Prognosis: Excellent    Patient verbalized understanding of POC.         Please contact me if you have any questions or recommendations. Thank you for the referral and the opportunity to share in Asad Galloway's care.        Plan  Plan details: complete testing, balance and functional strength/endurance re-training  Patient would benefit from: PT Eval and Skilled PT  Planned modality interventions: Biofeedback, Cryotherapy, TENS, Thermotherapy  Planned therapy interventions: Abdominal trunk stabilization, ADL training, Balance, Balance/WB training, Breathing training, Body mechanics training, Coordination, Functional ROM exercises, Gait training, HEP, Joint Mobilization, Manual Therapy, Griggs taping, Motor coordination training, Neuromuscular re-education, Patient education, Postural training, Strengthening, Stretching, Therapeutic activities, Therapeutic exercises, Therapeutic training, Transfer training, Activity modification, Work reintegration  Frequency: 2x/wk  Duration in weeks: 12  Plan of Care beginning date: 11/7/24  Plan of Care expiration date: 12 weeks - 1/30/25  Treatment plan discussed with: Patient       Goals  Short Term Goals (4 weeks):    - Patient will improve time on TUG by 2.9 seconds to facilitate improved safety in all ambulation MET  - Patient will be independent in basic HEP 2-3 weeks  - Patient will improve 5xSTS score by 2.3 seconds to promote improved LE functional strength needed for ADLs- NM       Long Term Goals (12 weeks):  - Patient will be independent in a comprehensive home exercise program- NM   - Patient will improve scoring on DGI by 2.6 points to progress  safety-NA  - Patient will improve gait speed by 0.18 m/s to improve safety with community ambulation-NM  - Patient will improve BURGER by 6 points in order to improve static balance and reduce risk for falls-NM  - Patient will improve scoring on FGA by 4 points to progress safety with dynamic tasks-NA  - Patient will be able to demonstrate HT in gait without veering-NM  - Patient will improve 6 Minute Walk Test score by 190 feet to promote improved cardiovascular endurance-NM  - Patient will report 50% reduction in near falls in order to improve safety with functional tasks and reduce his risk for falls-ONGOING  - Patient will report going on walks at least 3 days per week to promote independence and improved cardiovascular endurance-NM  - Patient will be able to ascend/descend stairs reciprocally with 1 UE assist to promote independence and safety with ADLs-NM  - Patient will report 50% reduction in near falls when ambulating on uneven terrain-NM       Cut off score    All date taken from APTA Neuro Section or Rehab Measures      Burger/56  MDC: 6 pts  Age Norms:  60-69: M - 55   F - 55  70-79: M - 54   F - 53  80-89: M - 53   F - 50 5xSTS: June et al 2010  MDC: 2.3 sec  Age Norms:  60-69: 11.4 sec  70-79: 12.6 sec  80-89: 14.8 sec   TUG  MDC: 4.14 sec  Cut off score:  >13.5 sec community dwelling adults  >32.2 frail elderly  <20 I for basic transfers  >30 dependent on transfers 10 Meter Walk Test: Zane al 2011  MDC: 0.18 m/s  20-29: M - 1.35 m   F - 1.34 m  30-39: M - 1.43 m   F - 1.34 m  40-49: M - 1.43 m   F - 1.39 m  50-59: M - 1.43 m   F - 1.31 m  60-69: M - 1.34 m   F - 1.24 m  70-79: M - 1.26 m   F - 1.13 m  80-89: M - 0.97 m   F - 0.94 m    Household Ambulator < 0.4 m/s  Limited Community Ambulator 0.4 - 0.8 m/s  Community Ambulator 0.8 - 1.2 m/s  Safely cross the street > 1.2 m/s   FGA  MCID: 4 pts  Geriatrics/community < 22/30 fall risk  Geriatrics/community < 20/30 unexplained  falls    DGI  MDC: vestibular - 4 pts  MDC: geriatric/community - 3 pts  Falls risk <19/24 mCTSIB  Norm: 20-60 yrs  Eyes open firm: norm sway 0.21-0.48  Eyes closed firm: norm sway 0.48-0.99  Eyes open foam: norm sway 0.38-0.71  Eyes closed foam: norm sway 0.70-2.22   6 Minute Walk Test  MDC: 190.98 ft  MCID: 164 ft    Age Norms  60-69: M - 1876 ft (571.80 m)  F - 1765 ft (537.98 m)  70-79: M - 1729 ft (527.00 m)  F - 1545 ft (470.92 m)  80-89: M - 1368 ft (416.97 m)  F - 1286 ft (391.97 m) ABC: Smith & Tre, 2003  <67% increased risk for falls   [place] Monofilaments  Evaluator Size:        Force (grams):          Hand/Dorsal Thresholds:        Plantar Thresholds:  - 1.65                       - 0.008                       - Normal                                 - Normal  - 2.36                       - 0.02                         - Normal                                 - Normal  - 2.44                       - 0.04                         - Normal                                 - Normal  - 2.83                       - 0.07                         - Normal                                 - Normal  - 3.22                       - 0.16                         - Diminished light touch          - Normal  - 3.61                       - 0.40                         - Diminished light touch          - Normal  - 3.84                       - 0.60                         - Diminished protective           - Diminished light touch  - 4.08                       - 1.00                         - Diminished protective           - Diminished light touch  - 4.17                       - 1.40                         - Diminished protective           - Diminished light touch  - 4.31                       - 2.00                         - Diminished protective           - Diminished light touch  - 4.56                       - 4.00                         - Loss of protective sense      - Diminished protective  - 4.74                        - 6.00                         - Loss of protective sense      - Diminished protective  - 4.93                       - 8.00                         - Loss of protective sense      - Diminished protective  - 5.07                       - 10.0                         - Loss of protective sense     - Loss of protective sense  - 5.18                       - 15.0                         - Loss of protective sense     - Loss of protective sense  - 5.46                       - 26.0                         - Loss of protective sense     - Loss of protective sense  - 5.88                       - 60.0                         - Loss of protective sense     - Loss of protective sense  - 6.10                       - 100                          - Loss of protective sense     - Loss of protective sense  - 6.45                       - 180                          - Loss of protective sense     - Loss of protective sense  - 6.65                       - 300                          - Deep pressure sense only  - Deep pressure sense only         Subjective    History of Present Illness  - Mechanism of injury: Patient was being tx at [facility] OP location; transitioned to [facility] for neuro/balance PT and neuro OT. He has CKD on HD M, W, Fx 5 years ; recent visit to ED 8/23 fistula bleeding but resolved. He  has a past medical history of Cerebrovascular accident (CVA) due to thrombosis of left middle cerebral artery (HCC) (07/29/2018), Chronic kidney disease, Coronary artery disease, Diabetes mellitus (HCC), Dialysis patient (HCC), Fistula, GERD (gastroesophageal reflux disease), History of coronary artery stent placement, Hypercholesteremia, Hyperlipidemia, Hypertension, Infectious viral hepatitis, Limb alert care status, Neuropathy, Obesity, Osteomyelitis (HCC), PVC's (premature ventricular contractions), Stroke (HCC), TIA (transient ischemic attack) (10/28/2018). He reports impaired balance and increased reliance on  "family for assist with ADLs; he wants to improve balance and independence    Update 10/10/24: Patient reports balance is improving with PT. He feels stronger    Updated 24: Patient reports that therapy has been \"beautiful.\" \"Everything has gotten better.\"     - Primary AD: cane intermittently   - Assist level at home: family assists him with ADLs  - Decreased fine motor tasks: No    Patient goal:  \"I want everything better \"     Pain  - Current pain ratin/10  - At best pain ratin/10  - At worst pain ratin/10  - Location: low back  - Aggravating factors: unsure     Social Support  - Steps to enter house: 3  - Stairs in house: yes but bedroom on 1st floor    - Lives in: multi story house   - Lives with: wife and 2 kids     - Employment status: retired   - Hand dominance: right    Treatments  - [facility] treatment[facility], [facility] clinic  - Current treatment: initiating PT eval/ tx ; also sees neuro OT        Objective     UE SCreen    LE MMT  - R Hip Flexion: 3+/5  L Hip Flexion: 4-/5  - R Hip Abduction: 4-/5  L Hip Abduction: 4-/5  - R Hip Adduction: 4-/5  L Hip Adduction: 4-/5  - R Knee Extension: 4-/5  L Knee Extension: 4-/5  - R Ankle DF: 4/5   L Ankle DF: 4/5  - R Ankle PF: 4-/5   L Ankle PF: 4/5    Sensation  - Light touch: intact       Coordination  - Heel to Shin: impaired B/L  - Alternate Toe Taps: impaired B/L          Postural Screen  - Observation: forward head        Gait  - Abnormalities: ambulates without AD on eval, demos narrow JOSE DAVID, inconsistent step length, lateral instability         Outcome Measures Initial Eval  24 Re-eval  10/10/24 Re eval   24      5xSTS 15.85 sec, 2 UE  15.41 sec, 2 UE  21.06 sec, pushing off thighs       TUG  - Regular  - Cognitive   17.3 sec, no AD  24.3 sec, (serial 3s)   12.59 sec, no AD  18.56 sec, no AD (serial 3s)   16. 44 sec, pushing off thighs  19.74 sec, serial 3s      10 meter   1.18 m/s without AD   0.86 m/s with cane   0.71 " m/s wo cane       JENNYFER 30/56 33/56 34/56      mCTSIB  - FTEO (firm)  - FTEC (firm)  - FTEO (foam)  - FTEC (foam)   30 sec +  30 ++  2 sec  0 sec   30 sec+  30 sec+  3 sec  0 sec   30 sec+  30 sec+  3 sec   0 sec      6MWT 100 ft (attempted but only able to complete 1 minute) 275 ft in 4 minutes  212 ft in 2 minutes w no cane       ABC 47.5% 81.88% 72.5%                          Precautions: falls   Past Medical History:   Diagnosis Date    Cerebrovascular accident (CVA) due to thrombosis of left middle cerebral artery (HCC) 07/29/2018    Chronic kidney disease     Coronary artery disease     Diabetes mellitus (HCC)     not on meds    Dialysis patient (HCC)     M-W-F    Fistula     left upper arm for hemodialyis    GERD (gastroesophageal reflux disease)     History of coronary artery stent placement     x3    Hypercholesteremia     Hyperlipidemia     Hypertension     Infectious viral hepatitis     B as child    Limb alert care status     no BP/IV left arm    Neuropathy     Obesity     Osteomyelitis (HCC)     last assessed 11/4/16-per son not currently    PVC's (premature ventricular contractions)     sees [facility]    Stroke (HCC)     last weeof July 2018 [facility]    TIA (transient ischemic attack) 10/28/2018    Wears dentures     Wears glasses

## 2024-11-09 DIAGNOSIS — I25.5 ISCHEMIC CARDIOMYOPATHY: ICD-10-CM

## 2024-11-09 DIAGNOSIS — Z95.5 STATUS POST INSERTION OF DRUG ELUTING CORONARY ARTERY STENT: ICD-10-CM

## 2024-11-12 ENCOUNTER — TELEPHONE (OUTPATIENT)
Dept: CARDIOLOGY CLINIC | Facility: CLINIC | Age: 64
End: 2024-11-12

## 2024-11-12 ENCOUNTER — OFFICE VISIT (OUTPATIENT)
Facility: CLINIC | Age: 64
End: 2024-11-12
Payer: MEDICARE

## 2024-11-12 DIAGNOSIS — Z86.73 HISTORY OF CVA (CEREBROVASCULAR ACCIDENT): Primary | ICD-10-CM

## 2024-11-12 DIAGNOSIS — R53.81 PHYSICAL DECONDITIONING: ICD-10-CM

## 2024-11-12 DIAGNOSIS — R26.89 POOR BALANCE: Primary | ICD-10-CM

## 2024-11-12 DIAGNOSIS — E11.42 DIABETIC POLYNEUROPATHY ASSOCIATED WITH TYPE 2 DIABETES MELLITUS (HCC): ICD-10-CM

## 2024-11-12 DIAGNOSIS — R41.89 COGNITIVE DEFICITS: ICD-10-CM

## 2024-11-12 PROCEDURE — 97112 NEUROMUSCULAR REEDUCATION: CPT

## 2024-11-12 PROCEDURE — 97110 THERAPEUTIC EXERCISES: CPT

## 2024-11-12 RX ORDER — PRASUGREL 5 MG/1
5 TABLET, FILM COATED ORAL DAILY
Qty: 90 TABLET | Refills: 1 | Status: SHIPPED | OUTPATIENT
Start: 2024-11-12

## 2024-11-12 NOTE — PROGRESS NOTES
Daily Note     Today's date: 2024  Patient name: Asad Galloway  : 1960  MRN: [id number]  Referring provider: Raj Auguste DO   Dx:   Encounter Diagnosis     ICD-10-CM    1. Poor balance  R26.89       2. Physical deconditioning  R53.81       3. Diabetic polyneuropathy associated with type 2 diabetes mellitus (HCC)  E11.42                   POC expires Unit limit Auth Expiration date PT/OT + Visit Limit? Co-Insurance   24   Bomn primary, 30pcy secondary Yes                                   PATIENT IS AFRAID OF DOGS    FLUID RESTRICTION, AND DO NOT GIVE PATIENT FOOD (PER WIFES REQUEST DUE TO SWALLOWING ISSUES)    Subjective: Patient presents to treatment with wife no new changes, complaints, or falls.     Objective: See treatment diary below    TA:   Vitals:   BP: 136/75 mmHg  HR: 73 bpm  SpO2: 93%    - STS w/ 2 UE x 6  - Hip add iso ball squeezes: 2 minute   - LAQs: 1 minute  - Seated marchin min   - Ambulation with 5.5#TT: 120 ft (BP: 137/74 mmHg)  - Standing core rotations 5.5# TT: 20x   - Stepping up and down foam pad with HHA: 10x   - Fwd/bwd ambulation with yellow posterior resistance: 5 ft, 5 x    Assessment: Patient tolerated treatment well with a focus on balance and endurance. Patient continues to illustrate significant retropulsion with STS likely due to decreased functional LE strength. Subjectively, patient reported increased dizziness after ambulation; vitals WNL. Most challenged with backwards ambulation with posterior resistance as noted by M/L instability and festination in order to maintain balance. He would benefit from continued PT to reduce fall risk and maximize safety with all functional mobility.       Plan: Continue per plan of care.         Outcome Measures Initial Eval  24 Re-eval  10/10/24       5xSTS 15.85 sec, 2 UE  15.41 sec, 2 UE        TUG  - Regular  - Cognitive   17.3 sec, no AD  24.3 sec, (serial 3s)   12.59 sec, no AD  18.56 sec, no AD (serial 3s)        10 meter   1.18 m/s without AD   0.86 m/s with cane       BURGER 30/56 33/56       mCTSIB  - FTEO (firm)  - FTEC (firm)  - FTEO (foam)  - FTEC (foam)   30 sec +  30 ++  2 sec  0 sec   30 sec+  30 sec+  3 sec  0 sec       6MWT 100 ft (attempted but only able to complete 1 minute) 275 ft in 4 minutes        ABC 47.5% 81.88%                           Precautions: falls   Past Medical History:   Diagnosis Date    Cerebrovascular accident (CVA) due to thrombosis of left middle cerebral artery (HCC) 07/29/2018    Chronic kidney disease     Coronary artery disease     Diabetes mellitus (HCC)     not on meds    Dialysis patient (HCC)     M-W-F    Fistula     left upper arm for hemodialyis    GERD (gastroesophageal reflux disease)     History of coronary artery stent placement     x3    Hypercholesteremia     Hyperlipidemia     Hypertension     Infectious viral hepatitis     B as child    Limb alert care status     no BP/IV left arm    Neuropathy     Obesity     Osteomyelitis (HCC)     last assessed 11/4/16-per son not currently    PVC's (premature ventricular contractions)     sees [facility]    Stroke (HCC)     last weeof July 2018 [facility]    TIA (transient ischemic attack) 10/28/2018    Wears dentures     Wears glasses

## 2024-11-12 NOTE — TELEPHONE ENCOUNTER
Patient called requesting refill for prasugrel (EFFIENT) tablet. Patient made aware medication was refilled on 11/12/24 for 90 with 1 refills to  [facility]/pharmacy #[id number] - [place] - [address]  pharmacy. Patient instructed to contact the pharmacy to obtain refills of medication. Patient verbalized understanding.

## 2024-11-12 NOTE — PROGRESS NOTES
"Daily Note     Today's date: 2024  Patient name: Asad Galloway  : 1960  MRN: [id number]  Referring provider: Beatriz Hung PA-C  Dx:   Encounter Diagnoses   Name Primary?    History of CVA (cerebrovascular accident) Yes    Cognitive deficits                     PLAN OF CARE START: 24  PLAN OF CARE END: 24  FREQUENCY: 2-3x/week  PRECAUTIONS dialysis 3x/week, falls, terrified of dogs (do not let him see [employer] at all), primary language is Greek but fluent in English; DONT GIVE ANY FLUIDS OR FOODS    Subjective: \"I'm going to die when my daughter moves\"- pt intermittently crying throughout session 2* reporting he loves his family and is sad to see his daughter move away.    Objective: See treatment below.      TA/NMR:   -Aphabetize the foods activity performed on table top with focus on sequencing, sustained attention, working memory, increasing R sided attention, figure ground skills.  Requires min cues to locate 3 items (all on the R) and 3 cues for sequencing    -[place] mosaic activity performed with design provided to R of board with focus on spatial relationships, FMC, sustained attn, inc R sided attention.  Requires min cues throughout    Assessment: Pt tolerated session well. Performed in semi modal environment without wife.  Pt benefits from encouragement, but otherwise not behavioral  Pt is to continue to benefit from continued OT to maximize functional performance and QOL s/p CVA.    Plan: Continued skilled OT per POC.    New Goals added :   Pt will demonstrate improved sustained attention as evidenced by completing TM part A within 1 minute with 0 verbal cues.   Pt will demonstrate improved FMC as evidenced by improving time on 9-hole peg test by 8 seconds with each hand.     Goals added :  Pt will demonstrate improved R sided peripersonal attention by locating items on b/l sides of table with minimally inc time for items on R>L and no cues.    "

## 2024-11-13 ENCOUNTER — TELEMEDICINE (OUTPATIENT)
Dept: VASCULAR SURGERY | Facility: CLINIC | Age: 64
End: 2024-11-13
Payer: MEDICARE

## 2024-11-13 DIAGNOSIS — I70.244 ATHEROSCLEROSIS OF NATIVE ARTERY OF LEFT LOWER EXTREMITY WITH ULCERATION OF HEEL (HCC): Chronic | ICD-10-CM

## 2024-11-13 DIAGNOSIS — I73.9 PAD (PERIPHERAL ARTERY DISEASE) (HCC): ICD-10-CM

## 2024-11-13 DIAGNOSIS — Z89.421 ABSENCE OF TOE OF RIGHT FOOT (HCC): ICD-10-CM

## 2024-11-13 DIAGNOSIS — I82.451 ACUTE EMBOLISM AND THROMBOSIS OF RIGHT PERONEAL VEIN (HCC): ICD-10-CM

## 2024-11-13 DIAGNOSIS — I82.441 ACUTE DEEP VEIN THROMBOSIS (DVT) OF TIBIAL VEIN OF RIGHT LOWER EXTREMITY (HCC): Primary | ICD-10-CM

## 2024-11-13 DIAGNOSIS — L97.409 NONHEALING ULCER OF HEEL (HCC): ICD-10-CM

## 2024-11-13 PROBLEM — L97.421 ULCER OF LEFT HEEL, LIMITED TO BREAKDOWN OF SKIN (HCC): Status: RESOLVED | Noted: 2024-06-13 | Resolved: 2024-11-13

## 2024-11-13 PROBLEM — S91.332A PENETRATING FOOT WOUND, LEFT, INITIAL ENCOUNTER: Status: RESOLVED | Noted: 2022-01-19 | Resolved: 2024-11-13

## 2024-11-13 PROCEDURE — 99213 OFFICE O/P EST LOW 20 MIN: CPT | Performed by: SURGERY

## 2024-11-13 NOTE — ASSESSMENT & PLAN NOTE
Secondary to multiple risk factors including end-stage renal disease and peripheral artery disease.  Continue to monitor peripheral artery disease in 6 months with arterial Doppler.

## 2024-11-13 NOTE — PROGRESS NOTES
Virtual Regular Visit  Name: Asad Galloway      : 1960      MRN: [id number]  Encounter Provider: Michelle Galvan MD  Encounter Date: 2024   Enc[facility] department: [facility] [place]    Verification of patient location:    Patient is located at Other in the following state in which I hold an active license [place]    Assessment & Plan  Acute deep vein thrombosis (DVT) of tibial vein of right lower extremity (HCC)    Orders:    VAS ARTERIAL DUPLEX- LOWER LIMB BILATERAL; Future    Acute embolism and thrombosis of right peroneal vein (HCC)  Provoked right peroneal vein DVT due to fall and twisting injury of ankle.  3 months Eliquis is suffice for treatment.   Repeat duplex shows that clot is now a chronic scar.  Arterial duplex, venous duplex from July and venous duplex done recently were reviewed in detail.    He is also on effient for cardiac stents.    So it is high risk for long term bleeding if we do both effient and eliquis.    So at this point as we have already finished his 3 month course of Eliquis, we can stop it and restart 81mg aspirin.  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  And he underwent additional peroneal angioplasties and posterior tibial angioplasty in 2024.  His left heel ulcer completely healed    For the past 2 months therapy no recurrence of the ulceration.  Denies any significant pain.    Arterial Doppler demonstrates improved toe pressure as well.    Follow-up in 6 months with arterial Doppler.    Orders:    VAS ARTERIAL DUPLEX- LOWER LIMB BILATERAL; Future    PAD (peripheral artery disease) (HCC)  See above        Absence of toe of right foot (HCC)  Secondary to multiple risk factors including end-stage renal disease and peripheral artery disease.  Continue to monitor peripheral artery disease in 6 months with arterial Doppler.               Encounter provider Michelle Galvan MD    The patient was identified by name and date of birth. Asad Galloway was informed that this is a telemedicine visit and that the visit is being conducted through the Epic Embedded platform. He agrees to proceed..  My office door was closed. No one else was in the room.  He acknowledged consent and understanding of privacy and security of the video platform. The patient has agreed to participate and understands they can discontinue the visit at any time.    Patient is aware this is a billable service.     History of Present Illness     Asad Galloway is a 64 y.o. male who presents for evaluation of right leg DVT and left foot circulation issues.  His left foot ulcer has been healed and remains healed for about 2 months now.  He is going to physical therapy and Occupational Therapy twice a week.    History obtained from : patient and patient's son  Review of Systems  Past Medical History   Past Medical History:   Diagnosis Date    Cerebrovascular accident (CVA) due to thrombosis of left middle cerebral artery (HCC) 07/29/2018    Chronic kidney disease     Coronary artery disease     Diabetes mellitus (HCC)     not on meds    Dialysis patient (HCC)     M-W-F    Fistula     left upper arm for hemodialyis    GERD (gastroesophageal reflux disease)     History of coronary artery stent placement     x3    Hypercholesteremia     Hyperlipidemia     Hypertension     Infectious viral hepatitis     B as child    Limb alert care status     no BP/IV left arm    Neuropathy     Obesity     Osteomyelitis (HCC)     last assessed 11/4/16-per son not currently    PVC's (premature ventricular contractions)     sees  Cardio    Stroke (HCC)     last weeof  "July 2018 [facility]    TIA (transient ischemic attack) 10/28/2018    Wears dentures     Wears glasses      Past Surgical History:   Procedure Laterality Date    ABDOMINAL SURGERY      CARDIAC CATHETERIZATION N/A 05/02/2022    Procedure: Cardiac Coronary Angiogram;  Surgeon: Sam Davis MD;  [facility];  Service: Cardiology    CARDIAC CATHETERIZATION N/A 05/02/2022    Procedure: Cardiac pci;  Surgeon: Sam Davis MD;  [facility];  Service: Cardiology    CARDIAC CATHETERIZATION  02/01/2023    Procedure: Cardiac catheterization;  Surgeon: Sam Davis MD;  [facility];  Service: Cardiology    CARDIAC CATHETERIZATION N/A 02/01/2023    Procedure: Cardiac pci;  Surgeon: Sam Davis MD;  [facility];  Service: Cardiology    CARDIAC CATHETERIZATION N/A 02/01/2023    Procedure: Cardiac Coronary Angiogram;  Surgeon: Sam Davis MD;  [facility];  Service: Cardiology    CARDIAC CATHETERIZATION N/A 02/01/2023    Procedure: Cardiac other-IVUS;  Surgeon: Sam Davis MD;  [facility];  Service: Cardiology    CHOLECYSTECTOMY      Percutaneous    COLONOSCOPY      CYSTOSCOPY      HEMODIALYSIS ADULT  1/22/2024    HEMODIALYSIS ADULT  1/24/2024    IR LOWER EXTREMITY ANGIOGRAM  9/29/2023    IR LOWER EXTREMITY ANGIOGRAM  2/26/2024    OTHER SURGICAL HISTORY      \"stimulator to control bowel movements\"    IN ESOPHAGOGASTRODUODENOSCOPY TRANSORAL DIAGNOSTIC N/A 09/27/2016    Procedure: ESOPHAGOGASTRODUODENOSCOPY (EGD);  Surgeon: Adele Rowe MD;  Location: [facility];  Service: Gastroenterology    IN LAPAROSCOPY SURG CHOLECYSTECTOMY N/A 02/29/2016    Procedure: LAPAROSCOPIC CHOLECYSTECTOMY ;  Surgeon: Cliff Roman DO;  [facility];  Service: General    IN SLCTV CATHJ 3RD+ ORD SLCTV ABDL PEL/LXTR BRNCH Left 9/29/2023    Procedure: Left leg angiogram with intervention;  Surgeon: Michelle Galvan MD; " " Location: [facility];  Service: Vascular    MI SLCTV CATHJ 3RD+ ORD SLCTV ABDL PEL/LXTR BRNCH Left 2/26/2024    Procedure: Left leg angiogram antegrade access, left popliteal angioplasty;  Surgeon: Michelle Galvan MD;  [facility];  Service: Vascular    ROTATOR CUFF REPAIR Right     SPINAL CORD STIMULATOR IMPLANT      \"Medtronic interstim model # 3058- in lower back to control bowel movements-currently turned off-battery is dead\"    TOE AMPUTATION Right 10/28/2016    Procedure: 3RD TOE AMPUTATION ;  Surgeon: Anjel Salas DPM;  [facility];  Service:      Family History   Problem Relation Age of Onset    Leukemia Mother     Liver disease Mother     Lung cancer Mother         heavy smoker - 3 ppd    Heart disease Father     Liver disease Father     Diabetes type I Father     Multiple myeloma Sister     Breast cancer Sister     Urolithiasis Family     Alcohol abuse Neg Hx     Depression Neg Hx     Drug abuse Neg Hx     Substance Abuse Neg Hx     Mental illness Neg Hx      Current Outpatient Medications on File Prior to Visit   Medication Sig Dispense Refill    apixaban (ELIQUIS) 5 mg Take 1 tablet (5 mg total) by mouth 2 (two) times a day 60 tablet 3    atorvastatin (LIPITOR) 40 mg tablet TAKE 1 TABLET BY MOUTH DAILY WITH DINNER 90 tablet 1    calcitriol (ROCALTROL) 0.5 MCG capsule Take 1 capsule (0.5 mcg total) by mouth 3 (three) times a week 12 capsule 0    cinacalcet (SENSIPAR) 60 MG tablet Take 2 tablets (120 mg total) by mouth 3 (three) times a week 24 tablet 0    divalproex sodium (DEPAKOTE SPRINKLE) 125 MG capsule Take 2 capsules (250 mg total) by mouth every 12 (twelve) hours 360 capsule 1    metoprolol succinate (TOPROL-XL) 25 mg 24 hr tablet TAKE 1 TABLET BY MOUTH TWICE A  tablet 1    MIDODRINE HCL PO Take 5 mg by mouth PRN w/ dialysis      mupirocin (BACTROBAN) 2 % ointment Apply topically 2 (two) times a day 30 g 0    prasugrel (EFFIENT) tablet TAKE 1 TABLET (5 MG " TOTAL) BY MOUTH DAILY. 90 tablet 1    sacubitril-valsartan (Entresto) 24-26 MG TABS Take 1 tablet by mouth in the morning 30 tablet 5    Sevelamer Carbonate 2.4 g PACK VIGOROUSLY MIX CONTENTS OF 1 PACKET IN WATER (IT WILL NOT DISSOLVE) AND DRINK 3 TIMES DAILY WITH MEALS      Vitamin D3 1.25 MG (79677 UT) capsule TAKE 1 CAPSULE BY MOUTH ONE TIME PER WEEK 12 capsule 4    [] cephalexin (KEFLEX) 500 mg capsule Take 1 capsule (500 mg total) by mouth every 12 (twelve) hours for 5 days 10 capsule 0     No current facility-administered medications on file prior to visit.   No Known Allergies       Objective     There were no vitals taken for this visit.  Physical Exam  Vitals and nursing note reviewed.   Constitutional:       Appearance: Normal appearance.   Neurological:      Mental Status: He is alert.         Visit Time  Total Visit Duration: 20 min

## 2024-11-13 NOTE — ASSESSMENT & PLAN NOTE
>>ASSESSMENT AND PLAN FOR ATHEROSCLEROSIS OF NATIVE ARTERY OF LEFT LOWER EXTREMITY WITH ULCERATION OF HEEL (HCC) WRITTEN ON 11/13/2024 10:25 AM BY JUSTIN MELENDEZ MD    Patient underwent multiple lower extremity procedures including angioplasty of the P2 segment with drug-coated balloon in February 26, 2024.  And he underwent additional peroneal angioplasties and posterior tibial angioplasty in July 2024.  His left heel ulcer completely healed    For the past 2 months therapy no recurrence of the ulceration.  Denies any significant pain.    Arterial Doppler demonstrates improved toe pressure as well.    Follow-up in 6 months with arterial Doppler.

## 2024-11-13 NOTE — PATIENT INSTRUCTIONS
So at this point as we have already finished his 3 month course of Eliquis, we can stop it and restart 81mg aspirin.    Stop Eliquis today.    Start 81mg aspirin from Friday 11/15/24.

## 2024-11-13 NOTE — ASSESSMENT & PLAN NOTE
Patient underwent multiple lower extremity procedures including angioplasty of the P2 segment with drug-coated balloon in February 26, 2024.  And he underwent additional peroneal angioplasties and posterior tibial angioplasty in July 2024.  His left heel ulcer completely healed    For the past 2 months therapy no recurrence of the ulceration.  Denies any significant pain.    Arterial Doppler demonstrates improved toe pressure as well.    Follow-up in 6 months with arterial Doppler.

## 2024-11-13 NOTE — ASSESSMENT & PLAN NOTE
Provoked right peroneal vein DVT due to fall and twisting injury of ankle.  3 months Eliquis is suffice for treatment.   Repeat duplex shows that clot is now a chronic scar.  Arterial duplex, venous duplex from July and venous duplex done recently were reviewed in detail.    He is also on effient for cardiac stents.    So it is high risk for long term bleeding if we do both effient and eliquis.    So at this point as we have already finished his 3 month course of Eliquis, we can stop it and restart 81mg aspirin.  Orders:    VAS ARTERIAL DUPLEX- LOWER LIMB BILATERAL; Future

## 2024-11-14 ENCOUNTER — APPOINTMENT (OUTPATIENT)
Facility: CLINIC | Age: 64
End: 2024-11-14
Payer: MEDICARE

## 2024-11-19 ENCOUNTER — OFFICE VISIT (OUTPATIENT)
Dept: HEMATOLOGY ONCOLOGY | Facility: MEDICAL CENTER | Age: 64
End: 2024-11-19
Payer: MEDICARE

## 2024-11-19 ENCOUNTER — OFFICE VISIT (OUTPATIENT)
Dept: PODIATRY | Facility: CLINIC | Age: 64
End: 2024-11-19
Payer: MEDICARE

## 2024-11-19 VITALS
WEIGHT: 208 LBS | TEMPERATURE: 98.4 F | SYSTOLIC BLOOD PRESSURE: 114 MMHG | OXYGEN SATURATION: 96 % | BODY MASS INDEX: 31.52 KG/M2 | RESPIRATION RATE: 17 BRPM | HEART RATE: 69 BPM | HEIGHT: 68 IN | DIASTOLIC BLOOD PRESSURE: 60 MMHG

## 2024-11-19 VITALS
HEART RATE: 76 BPM | WEIGHT: 208 LBS | BODY MASS INDEX: 30.81 KG/M2 | HEIGHT: 69 IN | SYSTOLIC BLOOD PRESSURE: 110 MMHG | DIASTOLIC BLOOD PRESSURE: 70 MMHG

## 2024-11-19 DIAGNOSIS — Z99.2 ESRD ON DIALYSIS (HCC): ICD-10-CM

## 2024-11-19 DIAGNOSIS — I73.9 PERIPHERAL ARTERIAL DISEASE (HCC): ICD-10-CM

## 2024-11-19 DIAGNOSIS — N18.6 ESRD ON DIALYSIS (HCC): ICD-10-CM

## 2024-11-19 DIAGNOSIS — L85.1 ACQUIRED KERATODERMA: ICD-10-CM

## 2024-11-19 DIAGNOSIS — Z89.421 ABSENCE OF TOE OF RIGHT FOOT (HCC): ICD-10-CM

## 2024-11-19 DIAGNOSIS — I82.5Z1 CHRONIC DEEP VEIN THROMBOSIS (DVT) OF DISTAL VEIN OF RIGHT LOWER EXTREMITY (HCC): Primary | ICD-10-CM

## 2024-11-19 DIAGNOSIS — E11.42 DIABETIC POLYNEUROPATHY ASSOCIATED WITH TYPE 2 DIABETES MELLITUS (HCC): Primary | ICD-10-CM

## 2024-11-19 DIAGNOSIS — B35.1 ONYCHOMYCOSIS: ICD-10-CM

## 2024-11-19 PROCEDURE — 11055 PARING/CUTG B9 HYPRKER LES 1: CPT | Performed by: PODIATRIST

## 2024-11-19 PROCEDURE — 99214 OFFICE O/P EST MOD 30 MIN: CPT | Performed by: PHYSICIAN ASSISTANT

## 2024-11-19 PROCEDURE — 11721 DEBRIDE NAIL 6 OR MORE: CPT | Performed by: PODIATRIST

## 2024-11-19 NOTE — PROGRESS NOTES
[facility] HEMATOLOGY ONCOLOGY SPECIALISTS ARLINE [address]vez [facility] [id number]  Hematology Ambulatory Follow-up  Asad Galloway, 1960, [id number]  11/19/2024      Assessment and Plan   1. DVT (deep venous thrombosis) (HCC)  Patient presents for follow-up of DVT.    He has complicated past medical history.  He has end-stage renal disease on dialysis, congestive heart failure.  History of CVA in 2018.  History of cardiac stents placed in 2020.  History of peripheral arterial disease.  He is currently on Effient.  Aspirin was stopped when he started Eliquis    He had venous duplex on 7/14/2024 with finding of DVT in one of the paired peroneal veins at the proximal cath in the right lower extremity.  He suffered a fall about 1 week prior to this in which he bruised his right ankle and was minimally mobile for a few days.    He was placed on Eliquis at that time.    He has no previous history of VTE.  No family history of VTE.    It seems likely that DVT was provoked by fall and subsequent lack of mobility.    He is currently on the kidney transplant list through [facility].  His son is concerned about him being on Eliquis as he feels that this will be a contraindication to him receiving a kidney.  He prefers to keep him on anticoagulation for the shortest period possible.    He had repeat venous duplex on 11/7/2024.  This showed evidence of chronic DVT in one of the paired peroneal veins at the proximal and mid cath in the right lower extremity.    He was was on Eliquis for about 4 months time.  He was seen by his vascular physician several days ago who recommended discontinuing Eliquis and restarting baby aspirin.    The patient does not need hematology follow-up.  I let his son Sam know that we would be happy to help with his care in the future if needed.  Patient voiced agreement and understanding to the above.   Patient knows to call the Hematology/Oncology office  with any questions and concerns regarding the above.    Barrier(s) to care: None.   The patient is able to self care.    Subjective     Chief Complaint   Patient presents with    Follow-up     History of present illness:   Patient presents for follow-up of DVT.    His past medical history is significant for end-stage renal disease on dialysis, congestive heart failure, anemia, hyperlipidemia, type 2 diabetes (no longer on any medications). He has history of CVA in July 2018. He also had stents placed in 2022. He is on Effient and aspirin. His son says he is resistant to plavix.    He is on the kidney transplant list at [facility].    He had a recent hospitalization at [facility] from 7/14/2024 through 7/16/2024 for cough, sore throat, fatigue and swollen right leg.    Venous duplex was significant for DVT in one of the paired peroneal veins at the proximal calf.  He was started on heparin drip and then transitioned to Eliquis on discharge.    He was seen in the ED previous to this on 7/5/2024 after suffering a fall.  He complained primarily of right ankle pain.  X-ray did not show any dislocation or fracture.    He denies any prior history of VTE.  No family history of VTE.    His son reports that since he has been on the kidney transplant list he has undergone thrombosis panel of testing every 3 years time (I cannot find these results in the charting.)  He says that this was completed most recently 1 year ago and the testing was unremarkable.    He had repeat venous duplex on 11/7/2024.  This showed evidence of chronic DVT in one of the paired peroneal veins at the proximal and mid cath in the right lower extremity.    He was seen by his vascular physician several days ago who recommended discontinuing Eliquis and restarting baby aspirin.    Todd is participating in physical therapy and is increasing his mobility. He denies chest pain or shortness of breath.  He has no bleeding on Eliquis. He  denies nausea or vomiting.  No significant swelling in either of the lower extremities.    Review of Systems   Constitutional:  Positive for fatigue. Negative for activity change, appetite change and fever.   HENT:  Negative for nosebleeds.    Respiratory:  Negative for cough, choking and shortness of breath.    Cardiovascular:  Negative for chest pain, palpitations and leg swelling.   Gastrointestinal:  Negative for abdominal distention, abdominal pain, anal bleeding, blood in stool, constipation, diarrhea, nausea and vomiting.   Endocrine: Negative for cold intolerance.   Genitourinary:  Negative for hematuria.   Musculoskeletal:  Positive for arthralgias. Negative for myalgias.   Skin:  Negative for color change, pallor and rash.   Allergic/Immunologic: Negative for immunocompromised state.   Neurological:  Negative for headaches.   Hematological:  Negative for adenopathy. Does not bruise/bleed easily.   All other systems reviewed and are negative.      Past Medical History:   Diagnosis Date    Cerebrovascular accident (CVA) due to thrombosis of left middle cerebral artery (HCC) 07/29/2018    Chronic kidney disease     Coronary artery disease     Diabetes mellitus (HCC)     not on meds    Dialysis patient ([facility])     M-W-F    Fistula     left upper arm for hemodialyis    GERD (gastroesophageal reflux disease)     History of coronary artery stent placement     x3    Hypercholesteremia     Hyperlipidemia     Hypertension     Infectious viral hepatitis     B as child    Limb alert care status     no BP/IV left arm    Neuropathy     Nonhealing ulcer of heel (HCC) 04/25/2023    Obesity     Osteomyelitis (HCC)     last assessed 11/4/16-per son not currently    Penetrating foot wound, left, initial encounter 01/19/2022    PVC's (premature ventricular contractions)     sees  Cardio    Stroke ([facility])     last weeof July 2018 [facility]    TIA (transient ischemic attack) 10/28/2018    Ulcer of left heel,  "limited to breakdown of skin (HCC) 06/13/2024    Wears dentures     Wears glasses      Past Surgical History:   Procedure Laterality Date    ABDOMINAL SURGERY      CARDIAC CATHETERIZATION N/A 05/02/2022    Procedure: Cardiac Coronary Angiogram;  Surgeon: Sam Davis MD;  [facility];  Service: Cardiology    CARDIAC CATHETERIZATION N/A 05/02/2022    Procedure: Cardiac pci;  Surgeon: Sam Davis MD;  [facility];  Service: Cardiology    CARDIAC CATHETERIZATION  02/01/2023    Procedure: Cardiac catheterization;  Surgeon: Sam Davis MD;  Location: [facility] CARDIAC [facility];  Service: Cardiology    CARDIAC CATHETERIZATION N/A 02/01/2023    Procedure: Cardiac pci;  Surgeon: Sam Davis MD;  [facility]: [facility] CARD[facility]AC [facility];  Service: Cardiology    CARDIAC CATHETERIZATION N/A 02/01/2023    Procedure: Cardiac Coronary Angiogram;  Surgeon: Sam Davis MD;  [facility]: [facility] CARD[facility];  Service: Cardiology    CARDIAC CATHETERIZATION N/A 02/01/2023    Procedure: Cardiac other-IVUS;  Surgeon: Sam Davis MD;  [facility]: [facility] CARD[facility];  Service: Cardiology    CHOLECYSTECTOMY      Percutaneous    COLONOSCOPY      CYSTOSCOPY      HEMODIALYSIS ADULT  1/22/2024    HEMODIALYSIS ADULT  1/24/2024    IR LOWER EXTREMITY ANGIOGRAM  9/29/2023    IR LOWER EXTREMITY ANGIOGRAM  2/26/2024    OTHER SURGICAL HISTORY      \"stimulator to control bowel movements\"    MS ESOPHAGOGASTRODUODENOSCOPY TRANSORAL DIAGNOSTIC N/A 09/27/2016    Procedure: ESOPHAGOGASTRODUODENOSCOPY (EGD);  Surgeon: Adele Rowe MD;  Location: [facility] GI LAB;  Service: Gastroenterology    MS LAPAROSCOPY SURG CHOLECYSTECTOMY N/A 02/29/2016    Procedure: LAPAROSCOPIC CHOLECYSTECTOMY ;  Surgeon: Cliff Roman DO;  Location: [facility] Main OR;  Service: General    MS SLCTV CATHJ 3RD+ ORD SLCTV ABDL PEL/LXTR BRNCH Left 9/29/2023    Procedure: Left leg angiogram with intervention;  Surgeon: Michelle Galvan MD;  Location: [facility] MAIN OR;  Service: " "Vascular    NM SLCTV CATHJ 3RD+ ORD SLCTV ABDL PEL/LXTR BRNCH Left 2/26/2024    Procedure: Left leg angiogram antegrade access, left popliteal angioplasty;  Surgeon: Michelle Galvan MD;  Location: [facility] MA[facility];  Service: Vascular    ROTATOR CUFF REPAIR Right     SPINAL CORD STIMULATOR IMPLANT      \"Medtronic interstim model # 3058- in lower back to control bowel movements-currently turned off-battery is dead\"    TOE AMPUTATION Right 10/28/2016    Procedure: 3RD TOE AMPUTATION ;  Surgeon: Anjel Salas DPM;  Location: [facility] Main [facility];  Service:      Family History   Problem Relation Age of Onset    Leukemia Mother     Liver disease Mother     Lung cancer Mother         heavy smoker - 3 ppd    Heart disease Father     Liver disease Father     Diabetes type I Father     Multiple myeloma Sister     Breast cancer Sister     Urolithiasis Family     Alcohol abuse Neg Hx     Depression Neg Hx     Drug abuse Neg Hx     Substance Abuse Neg Hx     Mental illness Neg Hx      Social History     Socioeconomic History    Marital status: /Civil Union     Spouse name: Not on file    Number of children: Not on file    Years of education: Not on file    Highest education level: Not asked   Occupational History    Occupation: disabled   Tobacco Use    Smoking status: Never    Smokeless tobacco: Never   Vaping Use    Vaping status: Never Used   Substance and Sexual Activity    Alcohol use: Never    Drug use: No    Sexual activity: Not Currently     Partners: Female     Comment: defer   Other Topics Concern    Not on file   Social History Narrative    Daily caffeine consumption 2-3 servings a day     Social Drivers of Health     Financial Resource Strain: Patient Declined (7/13/2023)    Overall Financial Resource Strain (CARDIA)     Difficulty of Paying Living Expenses: Patient declined   Food Insecurity: No Food Insecurity (7/18/2024)    Nursing - Inadequate Food Risk Classification     Worried About Running Out of Food in " the Last Year: Never true     Ran Out of Food in the Last Year: Never true     Ran Out of Food in the Last Year: Not on file   Transportation Needs: No Transportation Needs (7/18/2024)    PRAPARE - Transportation     Lack of Transportation (Medical): No     Lack of Transportation (Non-Medical): No   Physical Activity: Not on file   Stress: No Stress Concern Present (1/18/2019)    [employer] - Occupational Stress Questionnaire     Feeling of Stress : Only a little   Social Connections: Unknown (1/18/2019)    Social Connection and Isolation Panel [NHANES]     Frequency of Communication with Friends and Family: Not on file     Frequency of Social Gatherings with Friends and Family: Not on file     Attends Orthodoxy Services: Not on file     Active Member of Clubs or Organizations: Not on file     Attends Club or Organization Meetings: Not on file     Marital Status:    Intimate Partner Violence: Not At Risk (1/18/2019)    Humiliation, Afraid, Rape, and Kick questionnaire     Fear of Current or Ex-Partner: No     Emotionally Abused: No     Physically Abused: No     Sexually Abused: No   Housing Stability: Low Risk  (7/18/2024)    Housing Stability Vital Sign     Unable to Pay for Housing in the Last Year: No     Number of Times Moved in the Last Year: 0     Homeless in the Last Year: No         Current Outpatient Medications:     atorvastatin (LIPITOR) 40 mg tablet, TAKE 1 TABLET BY MOUTH DAILY WITH DINNER, Disp: 90 tablet, Rfl: 1    calcitriol (ROCALTROL) 0.5 MCG capsule, Take 1 capsule (0.5 mcg total) by mouth 3 (three) times a week, Disp: 12 capsule, Rfl: 0    cinacalcet (SENSIPAR) 60 MG tablet, Take 2 tablets (120 mg total) by mouth 3 (three) times a week, Disp: 24 tablet, Rfl: 0    divalproex sodium (DEPAKOTE SPRINKLE) 125 MG capsule, Take 2 capsules (250 mg total) by mouth every 12 (twelve) hours, Disp: 360 capsule, Rfl: 1    metoprolol succinate (TOPROL-XL) 25 mg 24 hr tablet,  TAKE 1 TABLET BY MOUTH TWICE A DAY, Disp: 180 tablet, Rfl: 1    MIDODRINE HCL PO, Take 5 mg by mouth PRN w/ dialysis, Disp: , Rfl:     prasugrel (EFFIENT) tablet, TAKE 1 TABLET (5 MG TOTAL) BY MOUTH DAILY., Disp: 90 tablet, Rfl: 1    sacubitril-valsartan (Entresto) 24-26 MG TABS, Take 1 tablet by mouth in the morning, Disp: 30 tablet, Rfl: 5    Sevelamer Carbonate 2.4 g PACK, VIGOROUSLY MIX CONTENTS OF 1 PACKET IN WATER (IT WILL NOT DISSOLVE) AND DRINK 3 TIMES DAILY WITH MEALS, Disp: , Rfl:     Vitamin D3 1.25 MG (10088 UT) capsule, TAKE 1 CAPSULE BY MOUTH ONE TIME PER WEEK, Disp: 12 capsule, Rfl: 4    apixaban (ELIQUIS) 5 mg, Take 1 tablet (5 mg total) by mouth 2 (two) times a day, Disp: 60 tablet, Rfl: 3    mupirocin (BACTROBAN) 2 % ointment, Apply topically 2 (two) times a day, Disp: 30 g, Rfl: 0  No Known Allergies    Objective     Vitals:    11/19/24 0807   BP: 114/60   Pulse: 69   Resp: 17   Temp: 98.4 °F (36.9 °C)   SpO2: 96%     Physical Exam  Constitutional:       General: He is not in acute distress.     Appearance: He is well-developed and normal weight.      Comments: Chronically ill-appearing.   HENT:      Head: Normocephalic and atraumatic.      Right Ear: External ear normal.      Left Ear: External ear normal.      Nose: Nose normal.   Eyes:      General: No scleral icterus.     Conjunctiva/sclera: Conjunctivae normal.   Cardiovascular:      Rate and Rhythm: Normal rate and regular rhythm.   Pulmonary:      Effort: No respiratory distress.   Abdominal:      General: Abdomen is flat. There is no distension.      Palpations: Abdomen is soft.      Tenderness: There is no abdominal tenderness.   Musculoskeletal:      Comments: Ambulates with a cane.   Skin:     Findings: No rash (on exposed skin.).   Neurological:      Mental Status: He is alert and oriented to person, place, and time.   Psychiatric:         Mood and Affect: Mood normal.         Thought Content: Thought content normal.         Judgment:  Judgment normal.     Extremities: No lower extremity edema bilaterally.    Result Review  Labs:      Imagin/7/2024 VAS VENOUS DUPLEX -LOWER LIMB UNILATERAL   CONCLUSION:  Impression:  RIGHT LOWER LIMB  Evidence of chronic deep vein thrombosis in one of the paired peroneal veins at  the proximal and mid calf.  No evidence of superficial thrombophlebitis noted.  Doppler evaluation shows a normal response to augmentation maneuvers.  Popliteal, posterior tibial and anterior tibial arterial Doppler waveforms are  triphasic.  LEFT LOWER LIMB LIMITED  Evaluation shows no evidence of thrombus in the common femoral vein.  Doppler evaluation shows a normal response to augmentation maneuvers.     In comparison to the study of 2024, there is no significant interval  change.     SIGNATURE:  Electronically Signed by: NAYELI SNOW on 2024 06:54:27 PM    2024 VAS VENOUS DUPLEX -LOWER LIMB UNILATERAL   CONCLUSION:  Impression:  RIGHT LOWER LIMB  Evidence of acute vs sub acute, occlusive deep vein thrombosis within (one) of  the paired peroneal veins at the proximal calf.  No evidence of superficial thrombophlebitis noted.  Doppler evaluation shows a normal response to augmentation maneuvers.  Popliteal, posterior tibial and anterior tibial arterial Doppler waveform's are  triphasic/hyperemic.     LEFT LOWER LIMB LIMITED  Evaluation shows no evidence of thrombus in the common femoral vein.  Doppler evaluation shows a normal response to augmentation maneuvers.     Technical findings were given to Jamaal Stark DO via [employer] secure chat.    Please note:  This report has been generated by a voice recognition software system. Therefore there may be syntax, spelling, and/or grammatical errors. Please call if you have any questions.

## 2024-11-19 NOTE — PROGRESS NOTES
PATIENT:  Asad Galloway    1960      ASSESSMENT:     1. Diabetic polyneuropathy associated with type 2 diabetes mellitus (HCC)        2. Peripheral arterial disease (HCC)        3. Absence of toe of right foot (HCC)            PLAN:  Educated disease prevention and risks related to diabetes.  Educated proper daily foot care and exam.  Instructed proper skin care / protection and footwear.  Discussed proper BS control with diet.  RA in 10 weeks.         Procedure: All mycotic toenails were reduced and debrided in length, width, and girth using a nail nipper and dremel.  All hyperkeratotic skin lesion(s) were sharply pared with a scalpel with no bleeding or evidence of ulceration.  Patient tolerated procedure(s) well without complications.        Subjective:      HPI:   The patients presents for diabetic foot care and exam.  He has high risk diabetic foot with PAD, history of ulcer and amputation.  No ulcer in his feet.   BS under control.      The following portions of the patient's history were reviewed and updated as appropriate: allergies, current medications, past family history, past medical history, past social history, past surgical history and problem list.  All pertinent labs and images were reviewed.    Past Medical History  Past Medical History:   Diagnosis Date    Cerebrovascular accident (CVA) due to thrombosis of left middle cerebral artery (HCC) 07/29/2018    Chronic kidney disease     Coronary artery disease     Diabetes mellitus (HCC)     not on meds    Dialysis patient (HCC)     M-W-F    Fistula     left upper arm for hemodialyis    GERD (gastroesophageal reflux disease)     History of coronary artery stent placement     x3    Hypercholesteremia     Hyperlipidemia     Hypertension     Infectious viral hepatitis     B as child    Limb alert care status     no BP/IV left arm    Neuropathy     Nonhealing ulcer of heel (HCC) 04/25/2023    Obesity     Osteomyelitis (HCC)     last assessed  "11/4/16-per son not currently    Penetrating foot wound, left, initial encounter 01/19/2022    PVC's (premature ventricular contractions)     sees  Cardio    Stroke ([facility])     last weeof July 2018 [facility]    TIA (transient ischemic attack) 10/28/2018    Ulcer of left heel, limited to breakdown of skin ([facility]) 06/13/2024    Wears dentures     Wears glasses        Past Surgical History  Past Surgical History:   Procedure Laterality Date    ABDOMINAL SURGERY      CARDIAC CATHETERIZATION N/A 05/02/2022    Procedure: Cardiac Coronary Angiogram;  Surgeon: Sam Davis MD;  [facility];  Service: Cardiology    CARDIAC CATHETERIZATION N/A 05/02/2022    Procedure: Cardiac pci;  Surgeon: Sam Davis MD;  [facility];  Service: Cardiology    CARDIAC CATHETERIZATION  02/01/2023    Procedure: Cardiac catheterization;  Surgeon: Sam Davis MD;  [facility];  Service: Cardiology    CARDIAC CATHETERIZATION N/A 02/01/2023    Procedure: Cardiac pci;  Surgeon: Sam Davis MD;  [facility];  Service: Cardiology    CARDIAC CATHETERIZATION N/A 02/01/2023    Procedure: Cardiac Coronary Angiogram;  Surgeon: Sam Davis MD;  [facility];  Service: Cardiology    CARDIAC CATHETERIZATION N/A 02/01/2023    Procedure: Cardiac other-IVUS;  Surgeon: Sam Davis MD;  [facility];  Service: Cardiology    CHOLECYSTECTOMY      Percutaneous    COLONOSCOPY      CYSTOSCOPY      HEMODIALYSIS ADULT  1/22/2024    HEMODIALYSIS ADULT  1/24/2024    IR LOWER EXTREMITY ANGIOGRAM  9/29/2023    IR LOWER EXTREMITY ANGIOGRAM  2/26/2024    OTHER SURGICAL HISTORY      \"stimulator to control bowel movements\"    OK ESOPHAGOGASTRODUODENOSCOPY TRANSORAL DIAGNOSTIC N/A 09/27/2016    Procedure: ESOPHAGOGASTRODUODENOSCOPY (EGD);  Surgeon: Adele Rowe MD;  Location: [facility] GI LAB;  Service: Gastroenterology    OK LAPAROSCOPY SURG CHOLECYSTECTOMY N/A " "02/29/2016    Procedure: LAPAROSCOPIC CHOLECYSTECTOMY ;  Surgeon: Cliff Roman DO;  Location: [facility] OR;  Service: General    HI SLCTV CATHJ 3RD+ ORD SLCTV ABDL PEL/LXTR BRNCH Left 9/29/2023    Procedure: Left leg angiogram with intervention;  Surgeon: Michelle Galvan MD;  Location: [facility];  Service: Vascular    HI SLCTV CATHJ 3RD+ ORD SLCTV ABDL PEL/LXTR BRNCH Left 2/26/2024    Procedure: Left leg angiogram antegrade access, left popliteal angioplasty;  Surgeon: Michelle Galvan MD;  Location: [facility];  Service: Vascular    ROTATOR CUFF REPAIR Right     SPINAL CORD STIMULATOR IMPLANT      \"Medtronic interstim model # 3058- in lower back to control bowel movements-currently turned off-battery is dead\"    TOE AMPUTATION Right 10/28/2016    Procedure: 3RD TOE AMPUTATION ;  Surgeon: Anjel Salas DPM;  Location: [facility];  Service:         Allergies:  Patient has no known allergies.    Medications:  Current Outpatient Medications   Medication Sig Dispense Refill    atorvastatin (LIPITOR) 40 mg tablet TAKE 1 TABLET BY MOUTH DAILY WITH DINNER 90 tablet 1    calcitriol (ROCALTROL) 0.5 MCG capsule Take 1 capsule (0.5 mcg total) by mouth 3 (three) times a week 12 capsule 0    cinacalcet (SENSIPAR) 60 MG tablet Take 2 tablets (120 mg total) by mouth 3 (three) times a week 24 tablet 0    divalproex sodium (DEPAKOTE SPRINKLE) 125 MG capsule Take 2 capsules (250 mg total) by mouth every 12 (twelve) hours 360 capsule 1    metoprolol succinate (TOPROL-XL) 25 mg 24 hr tablet TAKE 1 TABLET BY MOUTH TWICE A  tablet 1    MIDODRINE HCL PO Take 5 mg by mouth PRN w/ dialysis      prasugrel (EFFIENT) tablet TAKE 1 TABLET (5 MG TOTAL) BY MOUTH DAILY. 90 tablet 1    sacubitril-valsartan (Entresto) 24-26 MG TABS Take 1 tablet by mouth in the morning 30 tablet 5    Sevelamer Carbonate 2.4 g PACK VIGOROUSLY MIX CONTENTS OF 1 PACKET IN WATER (IT WILL NOT DISSOLVE) AND DRINK 3 TIMES DAILY WITH MEALS      " Vitamin D3 1.25 MG (24575 UT) capsule TAKE 1 CAPSULE BY MOUTH ONE TIME PER WEEK 12 capsule 4    apixaban (ELIQUIS) 5 mg Take 1 tablet (5 mg total) by mouth 2 (two) times a day 60 tablet 3    mupirocin (BACTROBAN) 2 % ointment Apply topically 2 (two) times a day 30 g 0     No current facility-administered medications for this visit.       Social History:  Social History     Socioeconomic History    Marital status: /Civil Union     Spouse name: None    Number of children: None    Years of education: None    Highest education level: Not asked   Occupational History    Occupation: disabled   Tobacco Use    Smoking status: Never    Smokeless tobacco: Never   Vaping Use    Vaping status: Never Used   Substance and Sexual Activity    Alcohol use: Never    Drug use: No    Sexual activity: Not Currently     Partners: Female     Comment: defer   Other Topics Concern    None   Social History Narrative    Daily caffeine consumption 2-3 servings a day     Social Drivers of Health     Financial Resource Strain: Patient Declined (7/13/2023)    Overall Financial Resource Strain (CARDIA)     Difficulty of Paying Living Expenses: Patient declined   Food Insecurity: No Food Insecurity (7/18/2024)    Nursing - Inadequate Food Risk Classification     Worried About Running Out of Food in the Last Year: Never true     Ran Out of Food in the Last Year: Never true     Ran Out of Food in the Last Year: Not on file   Transportation Needs: No Transportation Needs (7/18/2024)    PRAPARE - Transportation     Lack of Transportation (Medical): No     Lack of Transportation (Non-Medical): No   Physical Activity: Not on file   Stress: No Stress Concern Present (1/18/2019)    [employer] [place] [employer] - Occupational Stress Questionnaire     Feeling of Stress : Only a little   Social Connections: Unknown (1/18/2019)    Social Connection and Isolation Panel [NHANES]     Frequency of Communication with Friends and Family: Not on  "file     Frequency of Social Gatherings with Friends and Family: Not on file     Attends Cheondoism Services: Not on file     Active Member of Clubs or Organizations: Not on file     Attends Club or Organization Meetings: Not on file     Marital Status:    Intimate Partner Violence: Not At Risk (1/18/2019)    Humiliation, Afraid, Rape, and Kick questionnaire     Fear of Current or Ex-Partner: No     Emotionally Abused: No     Physically Abused: No     Sexually Abused: No   Housing Stability: Low Risk  (7/18/2024)    Housing Stability Vital Sign     Unable to Pay for Housing in the Last Year: No     Number of Times Moved in the Last Year: 0     Homeless in the Last Year: No        Review of Systems   Constitutional:  Negative for chills and fever.   Respiratory:  Negative for cough and shortness of breath.    Cardiovascular:  Negative for chest pain.   Gastrointestinal:  Negative for constipation, diarrhea, nausea and vomiting.   Neurological:  Positive for numbness.         Objective:      /70 (Patient Position: Sitting, Cuff Size: Standard)   Pulse 76   Ht 5' 9\" (1.753 m)   Wt 94.3 kg (208 lb)   BMI 30.72 kg/m²          Physical Exam  Vitals reviewed.   Constitutional:       General: He is not in acute distress.     Appearance: He is well-developed. He is not toxic-appearing or diaphoretic.   Cardiovascular:      Rate and Rhythm: Normal rate and regular rhythm.      Pulses:           Dorsalis pedis pulses are 1+ on the right side and 1+ on the left side.        Posterior tibial pulses are 1+ on the right side and 1+ on the left side.   Pulmonary:      Effort: Pulmonary effort is normal. No respiratory distress.   Musculoskeletal:         General: Deformity present. No tenderness.      Right foot: No foot drop.      Left foot: No foot drop.      Comments: Hammertoe deformity noted. 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.     Feet:      Right foot:      Skin integrity: Dry skin present. No ulcer, skin " breakdown, erythema, warmth or callus.      Left foot:      Skin integrity: Dry skin present. No ulcer, skin breakdown, erythema, warmth or callus.   Skin:     General: Skin is warm.      Capillary Refill: Capillary refill takes less than 2 seconds.      Coloration: Skin is not cyanotic or mottled.      Findings: No ecchymosis or erythema.      Nails: There is no clubbing.      Comments: Thick mycotic nails X 7 with discoloration and onycholysis.  HPK X 1 on left heel without ulcer.  He has class finding with previous toe amputation.   Neurological:      General: No focal deficit present.      Mental Status: He is alert and oriented to person, place, and time.      Cranial Nerves: No cranial nerve deficit.      Sensory: Sensory deficit present.      Motor: No weakness.   Psychiatric:         Mood and Affect: Mood normal.         Behavior: Behavior normal.         Thought Content: Thought content normal.         Judgment: Judgment normal.

## 2024-11-20 ENCOUNTER — PATIENT MESSAGE (OUTPATIENT)
Dept: CARDIOLOGY CLINIC | Facility: CLINIC | Age: 64
End: 2024-11-20

## 2024-11-21 ENCOUNTER — EVALUATION (OUTPATIENT)
Facility: CLINIC | Age: 64
End: 2024-11-21
Payer: MEDICARE

## 2024-11-21 ENCOUNTER — APPOINTMENT (OUTPATIENT)
Facility: CLINIC | Age: 64
End: 2024-11-21
Payer: MEDICARE

## 2024-11-21 DIAGNOSIS — R42 DIZZINESS: ICD-10-CM

## 2024-11-21 DIAGNOSIS — R41.89 COGNITIVE DEFICITS: ICD-10-CM

## 2024-11-21 DIAGNOSIS — Z86.73 HISTORY OF CVA (CEREBROVASCULAR ACCIDENT): Primary | ICD-10-CM

## 2024-11-21 PROCEDURE — 97530 THERAPEUTIC ACTIVITIES: CPT

## 2024-11-21 NOTE — PROGRESS NOTES
OCCUPATIONAL THERAPY RE-CERTIFICATION    Today's Date: 2024  Patient Name: Asad Galloway  : 1960  MRN: [id number]  Referring Provider: Beatriz Hung PA-C  Dx: No primary diagnosis found.    SKILLED ANALYSIS:  Pt is a right hand dominant 64 year old male presenting to OP OT s/p recent fall and decrease in functional status. Pt has a history of CVA from 2018. Per the patients wife, the pt has required assistance with ADLs and IADLs since his CVA in 2018, however has had a significant decline since his recent fall resulting in a hospitalization.  Pt continues to require assistance with dressing, bathing, bathroom management, and all IADLs, however pt wife reports ~15% improvement over the course of the past month. Pt functional cognition continues to improved based on the MoCA (improved by 2 points). Pt attention also improved based on TM part A today. Pt was observed to pay better attention during all assessments. Pt does continues to have significant weakness and impaired FMC bilaterally. Pt continues to demonstrate deficits in the following domains: EF skills, frustration tolerance, emotional regulation, attention, recall,  skills, standing balance, functional activity tolerance, UE strength, hand strength.  Deficits are noted based on the following assessments: MoCA, TM part A, ROM, dynamometer, pinch gauge, and clinical observation. These deficits are impairing the patient ability to perform ADLs and IADLs to a level he was at before his most recent fall. Recommend OP OT 2x/wk for an additional 4-8 weeks with focus on aforementioned deficits to maximize functional recovery, improve QOL, and reduce caregiver burden.  Findings and recommendations discussed with pt, and they are in agreement.    Educated pt on charges of insurance, acknowledge understanding, and are in agreement.    PLAN OF CARE START: 24  PLAN OF CARE END: 25  FREQUENCY: 2x/week  PRECAUTIONS dialysis 3x/week; NO FOOD OR  "WATER CAN BE GIVEN TO PATIENT; scared of dogs    Subjective:     11/21/24: Pt states he feels like he has been doing better since coming to therapy.     Occupational Profile  Pt lives with at home with his wife and two kids (23 and 28), other two kids are  and live on their own. Pt lives in a bi-level home. There are 5 steps down and 7 steps up. Pt resides downstairs. They have a shower bench, grab bars, handles on toilet, and railing to assist with getting into and out of bed. Pt is a retired , he has been retired since his stroke. Pt is not driving. Pt wife reports she provides ~80% assistance for all ADLs. Pt wife reports she does all IADLs. Pt was not independent before his recent fall, however he is significantly more dependent at this point.     PATIENT GOAL: \"To get better, to get stronger\"    HISTORY OF PRESENT ILLNESS:   Asad Galloway is a 64 y.o. male patient who originally presented to the hospital on 7/17/2024 due to ambulatory dysfunction after fall.  Patient was admitted to [facility] was discharged with a diagnosis of acute DVT on 07/16.  Patient presented to the emergency department on 07/17 after a mechanical fall at home.  Fall was witnessed.  Patient did not syncopized.  Patient's trauma evaluation was negative in the emergency department.  Patient was noted to have profound weakness with attempts to lift himself off of the toilet.  PT/OT recommended short-term rehab.  Today, wife at bedside is declining short-term rehab.  Family would like to be discharged with outpatient physical therapy/Occupational Therapy.      PMH:   Past Medical History:   Diagnosis Date    Cerebrovascular accident (CVA) due to thrombosis of left middle cerebral artery (HCC) 07/29/2018    Chronic kidney disease     Coronary artery disease     Diabetes mellitus (HCC)     not on meds    Dialysis patient (HCC)     M-W-F    Fistula     left upper arm for hemodialyis    GERD (gastroesophageal reflux " disease)     History of coronary artery stent placement     x3    Hypercholesteremia     Hyperlipidemia     Hypertension     Infectious viral hepatitis     B as child    Limb alert care status     no BP/IV left arm    Neuropathy     Nonhealing ulcer of heel (HCC) 04/25/2023    Obesity     Osteomyelitis (HCC)     last assessed 11/4/16-per son not currently    Penetrating foot wound, left, initial encounter 01/19/2022    PVC's (premature ventricular contractions)     sees [facility]    Stroke (HCC)     last weeof July 2018 [facility]    TIA (transient ischemic attack) 10/28/2018    Ulcer of left heel, limited to breakdown of skin (HCC) 06/13/2024    Wears dentures     Wears glasses        Past Surgical Hx:   Past Surgical History:   Procedure Laterality Date    ABDOMINAL SURGERY      CARDIAC CATHETERIZATION N/A 05/02/2022    Procedure: Cardiac Coronary Angiogram;  Surgeon: Sam Davis MD;  [facility];  Service: Cardiology    CARDIAC CATHETERIZATION N/A 05/02/2022    Procedure: Cardiac pci;  Surgeon: Sam Davis MD;  [facility];  Service: Cardiology    CARDIAC CATHETERIZATION  02/01/2023    Procedure: Cardiac catheterization;  Surgeon: Sam Davis MD;  [facility];  Service: Cardiology    CARDIAC CATHETERIZATION N/A 02/01/2023    Procedure: Cardiac pci;  Surgeon: Sam Davis MD;  [facility];  Service: Cardiology    CARDIAC CATHETERIZATION N/A 02/01/2023    Procedure: Cardiac Coronary Angiogram;  Surgeon: Sam Davis MD;  [facility];  Service: Cardiology    CARDIAC CATHETERIZATION N/A 02/01/2023    Procedure: Cardiac other-IVUS;  Surgeon: Sam Davis MD;  [facility];  Service: Cardiology    CHOLECYSTECTOMY      Percutaneous    COLONOSCOPY      CYSTOSCOPY      HEMODIALYSIS ADULT  1/22/2024    HEMODIALYSIS ADULT  1/24/2024    IR LOWER EXTREMITY ANGIOGRAM  9/29/2023    IR LOWER EXTREMITY  "ANGIOGRAM  2/26/2024    OTHER SURGICAL HISTORY      \"stimulator to control bowel movements\"    NE ESOPHAGOGASTRODUODENOSCOPY TRANSORAL DIAGNOSTIC N/A 09/27/2016    Procedure: ESOPHAGOGASTRODUODENOSCOPY (EGD);  Surgeon: Adele Rowe MD;  Location: [facility];  Service: Gastroenterology    NE LAPAROSCOPY SURG CHOLECYSTECTOMY N/A 02/29/2016    Procedure: LAPAROSCOPIC CHOLECYSTECTOMY ;  Surgeon: Cliff Roman DO;  [facility];  Service: General    NE SLCTV CATHJ 3RD+ ORD SLCTV ABDL PEL/LXTR BRNCH Left 9/29/2023    Procedure: Left leg angiogram with intervention;  Surgeon: Michelle Galvan MD;  [facility];  Service: Vascular    NE SLCTV CATHJ 3RD+ ORD SLCTV ABDL PEL/LXTR BRNCH Left 2/26/2024    Procedure: Left leg angiogram antegrade access, left popliteal angioplasty;  Surgeon: Michelle Galvan MD;  [facility];  Service: Vascular    ROTATOR CUFF REPAIR Right     SPINAL CORD STIMULATOR IMPLANT      \"Medtronic interstim model # 3058- in lower back to control bowel movements-currently turned off-battery is dead\"    TOE AMPUTATION Right 10/28/2016    Procedure: 3RD TOE AMPUTATION ;  Surgeon: Anjel Salas DPM;  [facility];  Service:         Pain: FLACC 0    Objective    Upper Extremities:  Pt is right hand dominant                MARIFER: RUE: 49lb (46 lbs) LUE: 19lb (19 lbs)  The age norm is approximately 76-89 lbs, indicating decreased  strength.                2 point pinch: RUE: 8, LUE: 2  3 point pinch: RUE: 10, LUE: 2  Lateral pinch: RUE: 12, LUE: 3                Range of Motion: PLEASE ASSESS NEXT SESSION  AROM:   R UE   - Shoulder flexion: 108  - Shoulder scaption/abduction:126  - Shoulder extension:   - Shoulder internal rotation: WNL  - Shoulder external rotation:32  - Elbow flexion: WNL  - Elbow extension: WNL  - Wrist flexion:WNL  - Wrist extension: WNL  - Pronation: WNL  - Supination: WNL  - Radial deviation: WNL  - Ulnar deviation: WNL  - Finger " extension: WNL   - Finger flexion: WNL    L UE: WFL     Subluxation: none    Scapular winging: none    Spasticity: none     Finger to Nose Testing with Visual Occlusion (proprioception):  WNL    Finger to Nose Testing without Visual Occlusion: WNL     Monofilaments/sensory testing: WNL    Functional Cognition:  Highest level of education: High school    Víctor Cognitive Assessment Version 8.3 (MoCA V8.3)  Visuospatial/executive functionin/5  Namin/3  Memory: 1st trial:  , 2nd trial:    Attention/concentration: 22  List of letters:   Serial Seven Subtraction:  3/3 w/ 1 error  Language/sentence repetition:    Language Fluency:  0/, 2 words  Abstract/Correlational Thinkin/2  Delayed Recall:    Orientation:  . Oriented to month, day, place, city.  Reports    Memory Index Score: 1/15     Raw Score:  15+1= 16/30, MIS:  3/15, indicative of MODERATE neurocognitive impairments.    MoCA Scoring        Normal: 26+         Mild Cognitive Impairment: 18-25          Moderate Cognitive Impairment: 10-17         Severe Cognitive Impairment: <10      NINE-HOLE PEG TEST: assesses dexterity/fine motor coordination. Alternative scoring: the number of pegs placed in 50 or 100 seconds can be recorded.     R hand: 127 seconds (Prior: 138 seconds)  L hand: Completed 6/9 pegs in 3 minutes then terminated due to difficulty and patient frustration, pt also compensated with R hand multiple times.  (Prior: 180 seconds with 8 pegs dropped 6x compensatory use of R UE)     Norms:   Sex Age Average R Average L   Male 61-65 20.87 21.60      Pt demonstrates decreased FMC compared to norms for age/sex         Trail making Part A and Part B:   Part A: 1:28 with 1 mistake corrected by the therapist (Prior: 2:07 with 2 VCs).  Part B: 7:56 with 4 cues to locate items, 3 cues for direction following, 6 cues for sequencing (not performed)    Indicating deficits: Part A deficit > 33.22 seconds for age related  norms    GOALS:   Short Term Goals:  Pt will increase B UE  strength by 4 lbs to improve performance with ADLs Progressing   Pt will increase R lateral pinch strength by 2 lb to improve performance with ADLs/dressing ACHIEVED  Pt will demonstrate improved functional cognition as evidenced by improving score on the MoCA to 10/30 to improve performance during ADLs and IADLs ACHIEVED 9/12  Pt will demonstrate improved sustained attention as evidenced by completing TM part A within 1 minute with 0 verbal cues. Progressing  Pt will demonstrate improved FMC as evidenced by improving time on 9-hole peg test by 8 seconds with each hand. DECLINED    Long Term Goals: 8-12 weeks  Pt will increase B UE  strength by 7 lbs to improve performance with ADLs   Pt will increase R lateral pinch strength by 3 lb to improve performance with ADLs/dressing  Pt will demonstrate improved functional cognition as evidenced by improving score on the MoCA to 12/30 to improve performance during ADLs and IADLs ACHIEVED 9/12  Pt will improve functional activity tolerance and dressing to complete with no more than mod(A) from his wife per report. Progressing  Pt will improve functional activity tolerance and bathroom skills to complete with no more than mod(A) from his wife per report. Progressing    Goals added 10/15:  Pt will demonstrate improved overall cognition by scoring at least 17/30 on MoCA for carry over with daily activities. Progressing    OTHER PLANNED THERAPY INTERVENTIONS:   Supine, seated, and in stance neuro re-ed  Tricep AG  NMES/FES  FMC/prehension  Timed Trials  Manual tx  Hand to target  Sensory re-ed  Seated functional reach: crossing midline  Supine place and hold  WBearing strategies   Closed chain activities  Open chain activities  Internal and external memory aides  Multimatrix for saccades/ visual clutter/attention  Hypersensitivity strategies education  Multi-modal environment  Sustained/alternating/divided  attention  Tracking tube  Oculomotor control:  saccades, con/divergence  Conv./div. Dynamic tasks  Work stations with timed transitions  Temporal Awareness  Memory and mental manipulation  Auditory processing with immediate recall  Memory retention with immediate and delayed recall  Edu on cog/vision apps      Objective Measurement Tracker:    IE (8/13/24) 1st Re-eval: () 2nd Re-eval: ( 3rd Re-eval: ()    MoCA 8/30 13/30      TM Test A        TM Test B                Nine Hole Peg Test R: 112  L: 78 R: 138  L: N/A      Functional Dexterity Test         Strength R: 50lb, L: 20lb R: 50; L: 20      Lateral Pinch Strength R: 11lb, L: 4lb R: 10lb, L: 4lb      2 Point Pinch Strength R: 5lb, L: 1lb  R: 8lb, L: 1lb         3 Point Pinch Strength R: 6lb, L: 1lb  R: 10lb, L: 1lb

## 2024-11-26 ENCOUNTER — APPOINTMENT (OUTPATIENT)
Facility: CLINIC | Age: 64
End: 2024-11-26
Payer: MEDICARE

## 2024-11-26 ENCOUNTER — OFFICE VISIT (OUTPATIENT)
Facility: CLINIC | Age: 64
End: 2024-11-26
Payer: MEDICARE

## 2024-11-26 DIAGNOSIS — R26.89 POOR BALANCE: Primary | ICD-10-CM

## 2024-11-26 DIAGNOSIS — R53.81 PHYSICAL DECONDITIONING: ICD-10-CM

## 2024-11-26 DIAGNOSIS — E11.42 DIABETIC POLYNEUROPATHY ASSOCIATED WITH TYPE 2 DIABETES MELLITUS (HCC): ICD-10-CM

## 2024-11-26 PROCEDURE — 97530 THERAPEUTIC ACTIVITIES: CPT

## 2024-11-26 PROCEDURE — 97110 THERAPEUTIC EXERCISES: CPT

## 2024-11-26 NOTE — PROGRESS NOTES
Daily Note     Today's date: 2024  Patient name: Asad Galloway  : 1960  MRN: [id number]  Referring provider: Raj Auguste DO   Dx:   Encounter Diagnosis     ICD-10-CM    1. Poor balance  R26.89       2. Physical deconditioning  R53.81       3. Diabetic polyneuropathy associated with type 2 diabetes mellitus (HCC)  E11.42                     POC expires Unit limit Auth Expiration date PT/OT + Visit Limit? Co-Insurance   25   Bomn primary, 30pcy secondary Yes                                   PATIENT IS AFRAID OF DOGS    FLUID RESTRICTION, AND DO NOT GIVE PATIENT FOOD (PER WIFES REQUEST DUE TO SWALLOWING ISSUES)    Subjective: Patient presents to treatment with wife no new changes, complaints, or falls.     Objective: See treatment diary below    TE/TA:   Vitals:   BP: 96/54 mmHg  HR: 73 bpm  SpO2: 99%    - LAQs: 2 min  - STS no UE: 3x (complaint of dizziness, then became tearful)  - Hip add iso ball squeezes: 2 minutes   - Ambulation with 5.5#TT: 40 ft, 2 laps  - Seated hip abductions: 2 min    BP post: 81/47 mmHg, after 5 min 81/47 mmHg    Assessment: Patient tolerated treatment poorly with a focus on strengthening and functional mobility. Participation largely limited this date secondary to complaints of dizziness upon standing which was followed by patient becoming distressed and tearful. Improved participation following redirection from wife. Post session patient displayed low BP, patient and wife denied EMS services and wife stated patient likely in need of his medications at home. Educated and advised patient seek high medical attention should BP remain low and symptoms arise; patient and wife in good verbal understanding. He would benefit from continued PT to reduce fall risk and maximize safety with all functional mobility.       Plan: Continue per plan of care.         Outcome Measures Initial Eval  24 Re-eval  10/10/24       5xSTS 15.85 sec, 2 UE  15.41 sec, 2 UE        TUG  -  Regular  - Cognitive   17.3 sec, no AD  24.3 sec, (serial 3s)   12.59 sec, no AD  18.56 sec, no AD (serial 3s)       10 meter   1.18 m/s without AD   0.86 m/s with cane       BURGER 30/56 33/56       mCTSIB  - FTEO (firm)  - FTEC (firm)  - FTEO (foam)  - FTEC (foam)   30 sec +  30 ++  2 sec  0 sec   30 sec+  30 sec+  3 sec  0 sec       6MWT 100 ft (attempted but only able to complete 1 minute) 275 ft in 4 minutes        ABC 47.5% 81.88%                           Precautions: falls   Past Medical History:   Diagnosis Date    Cerebrovascular accident (CVA) due to thrombosis of left middle cerebral artery (HCC) 07/29/2018    Chronic kidney disease     Coronary artery disease     Diabetes mellitus (HCC)     not on meds    Dialysis patient (HCC)     M-W-F    Fistula     left upper arm for hemodialyis    GERD (gastroesophageal reflux disease)     History of coronary artery stent placement     x3    Hypercholesteremia     Hyperlipidemia     Hypertension     Infectious viral hepatitis     B as child    Limb alert care status     no BP/IV left arm    Neuropathy     Obesity     Osteomyelitis (HCC)     last assessed 11/4/16-per son not currently    PVC's (premature ventricular contractions)     sees [facility]    Stroke (HCC)     last weeof July 2018 [facility]    TIA (transient ischemic attack) 10/28/2018    Wears dentures     Wears glasses

## 2024-12-02 ENCOUNTER — NURSE TRIAGE (OUTPATIENT)
Age: 64
End: 2024-12-02

## 2024-12-02 NOTE — TELEPHONE ENCOUNTER
"Pt is s/p SVT of tibial vein and embolism and thrombosis of right peroneal vein. He was recently taken off his Eliquis 11/13 @ his visit with Dr. Galvan.    Received a call from patients son Sam. Pt was at dialysis today where they did a foot assessment and noticed patients right foot to be ice cold as well as right ankle to pretibial area to be slightly swollen and red. Pt denies pain at this time as well as SOB and chest pain.     Advised Sam  will inform provider of symptoms and call patient back with any recommendations.     Sam # [phone]        Answer Assessment - Initial Assessment Questions  1. ONSET: \"When did the swelling start?\" (e.g., minutes, hours, days)      3 days   2. LOCATION: \"What part of the leg is swollen?\"  \"Are both legs swollen or just one leg?\"      Right lower leg ankle to pre tibia   3. SEVERITY: \"How bad is the swelling?\" (e.g., localized; mild, moderate, severe)      Localized   4. REDNESS: \"Does the swelling look red or infected?\"      Mild redness   5. PAIN: \"Is the swelling painful to touch?\" If Yes, ask: \"How painful is it?\"   (Scale 1-10; mild, moderate or severe)      denies  6. FEVER: \"Do you have a fever?\" If Yes, ask: \"What is it, how was it measured, and when did it start?\"       denies  7. CAUSE: \"What do you think is causing the leg swelling?\"      Unsure   8. MEDICAL HISTORY: \"Do you have a history of blood clots (e.g., DVT), cancer, heart failure, kidney disease, or liver failure?\"      YES hx of DVT recently stopped NOAC 11/13 for same, Also on dialysis   9. RECURRENT SYMPTOM: \"Have you had leg swelling before?\" If Yes, ask: \"When was the last time?\" \"What happened that time?\"      Yes recent right LE DVT   10. OTHER SYMPTOMS: \"Do you have any other symptoms?\" (e.g., chest pain, difficulty breathing)        denies    Protocols used: Leg Swelling and Edema-Adult-[place]    "

## 2024-12-02 NOTE — TELEPHONE ENCOUNTER
Pt son Sam called back stating leg has been elevated during dialysis and now right foot is warm.   Pt denies any numbness or tingling.

## 2024-12-02 NOTE — TELEPHONE ENCOUNTER
Any motor or sensation deficits? Any improvement in temperature or discoloration with leg elevation?

## 2024-12-03 ENCOUNTER — OFFICE VISIT (OUTPATIENT)
Facility: CLINIC | Age: 64
End: 2024-12-03
Payer: MEDICARE

## 2024-12-03 DIAGNOSIS — E11.42 DIABETIC POLYNEUROPATHY ASSOCIATED WITH TYPE 2 DIABETES MELLITUS (HCC): ICD-10-CM

## 2024-12-03 DIAGNOSIS — R41.89 COGNITIVE DEFICITS: ICD-10-CM

## 2024-12-03 DIAGNOSIS — R26.89 POOR BALANCE: Primary | ICD-10-CM

## 2024-12-03 DIAGNOSIS — R53.81 PHYSICAL DECONDITIONING: ICD-10-CM

## 2024-12-03 DIAGNOSIS — Z86.73 HISTORY OF CVA (CEREBROVASCULAR ACCIDENT): Primary | ICD-10-CM

## 2024-12-03 PROCEDURE — 97110 THERAPEUTIC EXERCISES: CPT

## 2024-12-03 PROCEDURE — 97530 THERAPEUTIC ACTIVITIES: CPT

## 2024-12-03 NOTE — PROGRESS NOTES
"Daily Note     Today's date: 12/3/2024  Patient name: Asad Galloway  : 1960  MRN: [id number]  Referring provider: Beatriz Hung PA-C  Dx:   Encounter Diagnosis   Name Primary?    History of CVA (cerebrovascular accident) Yes                    PLAN OF CARE START: 24  PLAN OF CARE END: 24  FREQUENCY: 2-3x/week  PRECAUTIONS dialysis 3x/week, falls, terrified of dogs (do not let him see [employer] at all), primary language is Salvadorean but fluent in English; DONT GIVE ANY FLUIDS OR FOODS    Subjective: \"Please please give me water.  I need water.  Im so thirsty\".  Pt unable to be redirected from asking for water, and is noted to be much more emotionally labile today 2* wife going out of town for her mothers  in [place].  OT provided with pt with 2oz of water to prevent severe behavioral outburst.  Following, he is much more agreeable.    Objective: See treatment below.      TA/NMR:   -Visual logic sequencing activity performed with focus on logical reasoning, sequencing, sustained attn.  ~70% accuracy initially, able to identify ~50% of errors and requires Roni to correct    -Sequencing, ranking by attributes performed           Assessment: Pt tolerated session well. Performed in semi modal environment without wife.  Pt benefits from encouragement, but otherwise not behavioral  Pt is to continue to benefit from continued OT to maximize functional performance and QOL s/p CVA.    Plan: Continued skilled OT per POC.    New Goals added :   Pt will demonstrate improved sustained attention as evidenced by completing TM part A within 1 minute with 0 verbal cues.   Pt will demonstrate improved FMC as evidenced by improving time on 9-hole peg test by 8 seconds with each hand.     Goals added :  Pt will demonstrate improved R sided peripersonal attention by locating items on b/l sides of table with minimally inc time for items on R>L and no cues.    "

## 2024-12-03 NOTE — PROGRESS NOTES
Daily Note     Today's date: 12/3/2024  Patient name: Asad Galloway  : 1960  MRN: [id number]  Referring provider: Raj Auguste DO   Dx:   Encounter Diagnosis     ICD-10-CM    1. Poor balance  R26.89       2. Physical deconditioning  R53.81       3. Diabetic polyneuropathy associated with type 2 diabetes mellitus (HCC)  E11.42                       POC expires Unit limit Auth Expiration date PT/OT + Visit Limit? Co-Insurance   25   Bomn primary, 30pcy secondary Yes                                   PATIENT IS AFRAID OF DOGS    FLUID RESTRICTION, AND DO NOT GIVE PATIENT FOOD (PER WIFES REQUEST DUE TO SWALLOWING ISSUES)    Subjective: Patient presents to treatment stating his wife had to go to [place] due to a family passing away. Patient is tearful and asking for water.    Objective: See treatment diary below    TE/TA:   Vitals:   BP: 64/41 mmHg automatic cuff dizziness and light headedness, given small amount of water   93/81 mmHg  HR: 62 bpm  SpO2: 99%    - Hip add iso ball squeezes: 2 minutes   - Seated marches: 2 min  - Trunk rotations: 2 min  - LAQs: 2 min  - Seated hip abd:     Assessment: Patient tolerated treatment poorly with a focus on strengthening. Patient did not want to participate with standing exercises this date due to dizziness and complaints of pain. He also became combative and defensive when refused water; attempted to redirect and explain fluid restriction however, unsuccessful. Gave patient ~ounce of water which improved participation, behavior, and blood pressure. Proceeded with sitting exercises which patient tolerated well. He would benefit from continued PT to reduce fall risk and maximize safety with all functional mobility.       Plan: Continue per plan of care.         Outcome Measures Initial Eval  24 Re-eval  10/10/24       5xSTS 15.85 sec, 2 UE  15.41 sec, 2 UE        TUG  - Regular  - Cognitive   17.3 sec, no AD  24.3 sec, (serial 3s)   12.59 sec, no AD  18.56 sec, no  AD (serial 3s)       10 meter   1.18 m/s without AD   0.86 m/s with cane       BURGER 30/56 33/56       mCTSIB  - FTEO (firm)  - FTEC (firm)  - FTEO (foam)  - FTEC (foam)   30 sec +  30 ++  2 sec  0 sec   30 sec+  30 sec+  3 sec  0 sec       6MWT 100 ft (attempted but only able to complete 1 minute) 275 ft in 4 minutes        ABC 47.5% 81.88%                           Precautions: falls   Past Medical History:   Diagnosis Date    Cerebrovascular accident (CVA) due to thrombosis of left middle cerebral artery (HCC) 07/29/2018    Chronic kidney disease     Coronary artery disease     Diabetes mellitus (HCC)     not on meds    Dialysis patient (HCC)     M-W-F    Fistula     left upper arm for hemodialyis    GERD (gastroesophageal reflux disease)     History of coronary artery stent placement     x3    Hypercholesteremia     Hyperlipidemia     Hypertension     Infectious viral hepatitis     B as child    Limb alert care status     no BP/IV left arm    Neuropathy     Obesity     Osteomyelitis (HCC)     last assessed 11/4/16-per son not currently    PVC's (premature ventricular contractions)     sees [facility]    Stroke (HCC)     last weeof July 2018 [facility]    TIA (transient ischemic attack) 10/28/2018    Wears dentures     Wears glasses

## 2024-12-04 ENCOUNTER — APPOINTMENT (OUTPATIENT)
Facility: CLINIC | Age: 64
End: 2024-12-04
Payer: MEDICARE

## 2024-12-05 ENCOUNTER — APPOINTMENT (OUTPATIENT)
Facility: CLINIC | Age: 64
End: 2024-12-05
Payer: MEDICARE

## 2024-12-11 ENCOUNTER — APPOINTMENT (OUTPATIENT)
Facility: CLINIC | Age: 64
End: 2024-12-11
Payer: MEDICARE

## 2024-12-12 ENCOUNTER — RA CDI HCC (OUTPATIENT)
Dept: OTHER | Facility: HOSPITAL | Age: 64
End: 2024-12-12

## 2024-12-15 ENCOUNTER — APPOINTMENT (EMERGENCY)
Dept: RADIOLOGY | Facility: HOSPITAL | Age: 64
End: 2024-12-15
Payer: MEDICARE

## 2024-12-15 ENCOUNTER — HOSPITAL ENCOUNTER (EMERGENCY)
Facility: HOSPITAL | Age: 64
Discharge: HOME | End: 2024-12-15
Attending: EMERGENCY MEDICINE
Payer: MEDICARE

## 2024-12-15 ENCOUNTER — APPOINTMENT (EMERGENCY)
Dept: CT IMAGING | Facility: HOSPITAL | Age: 64
End: 2024-12-15
Payer: MEDICARE

## 2024-12-15 VITALS
BODY MASS INDEX: 32.52 KG/M2 | RESPIRATION RATE: 16 BRPM | DIASTOLIC BLOOD PRESSURE: 56 MMHG | SYSTOLIC BLOOD PRESSURE: 112 MMHG | TEMPERATURE: 95.4 F | HEART RATE: 66 BPM | OXYGEN SATURATION: 90 % | WEIGHT: 220.24 LBS

## 2024-12-15 DIAGNOSIS — S09.90XA CLOSED HEAD INJURY, INITIAL ENCOUNTER: ICD-10-CM

## 2024-12-15 DIAGNOSIS — S00.03XA HEMATOMA OF FRONTAL SCALP, INITIAL ENCOUNTER: ICD-10-CM
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
## 2024-12-15 LAB
2HR DELTA HS TROPONIN: 0 NG/L
ALBUMIN SERPL BCG-MCNC: 4 G/DL (ref 3.5–5)
ALP SERPL-CCNC: 101 U/L (ref 34–104)
ALT SERPL W P-5'-P-CCNC: 7 U/L (ref 7–52)
ANION GAP SERPL CALCULATED.3IONS-SCNC: 11 MMOL/L (ref 4–13)
ANISOCYTOSIS BLD QL SMEAR: PRESENT
APTT PPP: 31 SECONDS (ref 23–34)
AST SERPL W P-5'-P-CCNC: 8 U/L (ref 13–39)
ATRIAL RATE: 72 BPM
BASOPHILS # BLD AUTO: 0.02 THOUSANDS/ΜL (ref 0–0.1)
BASOPHILS NFR BLD AUTO: 0 % (ref 0–1)
BILIRUB SERPL-MCNC: 0.82 MG/DL (ref 0.2–1)
BUN SERPL-MCNC: 31 MG/DL (ref 5–25)
CALCIUM SERPL-MCNC: 8.2 MG/DL (ref 8.4–10.2)
CARDIAC TROPONIN I PNL SERPL HS: 73 NG/L (ref ?–50)
CARDIAC TROPONIN I PNL SERPL HS: 73 NG/L (ref ?–50)
CHLORIDE SERPL-SCNC: 96 MMOL/L (ref 96–108)
CO2 SERPL-SCNC: 32 MMOL/L (ref 21–32)
CREAT SERPL-MCNC: 6.73 MG/DL (ref 0.6–1.3)
EOSINOPHIL # BLD AUTO: 0.09 THOUSAND/ΜL (ref 0–0.61)
EOSINOPHIL NFR BLD AUTO: 2 % (ref 0–6)
ERYTHROCYTE [DISTWIDTH] IN BLOOD BY AUTOMATED COUNT: 15.8 % (ref 11.6–15.1)
FLUAV AG UPPER RESP QL IA.RAPID: NEGATIVE
FLUBV AG UPPER RESP QL IA.RAPID: NEGATIVE
GFR SERPL CREATININE-BSD FRML MDRD: 7 ML/MIN/1.73SQ M
GLUCOSE SERPL-MCNC: 92 MG/DL (ref 65–140)
GLUCOSE SERPL-MCNC: 98 MG/DL (ref 65–140)
HCT VFR BLD AUTO: 34.2 % (ref 36.5–49.3)
HGB BLD-MCNC: 10.6 G/DL (ref 12–17)
IMM GRANULOCYTES # BLD AUTO: 0.03 THOUSAND/UL (ref 0–0.2)
IMM GRANULOCYTES NFR BLD AUTO: 1 % (ref 0–2)
INR PPP: 1.1 (ref 0.85–1.19)
LACTATE SERPL-SCNC: 1 MMOL/L (ref 0.5–2)
LYMPHOCYTES # BLD AUTO: 0.82 THOUSANDS/ΜL (ref 0.6–4.47)
LYMPHOCYTES NFR BLD AUTO: 15 % (ref 14–44)
MAGNESIUM SERPL-MCNC: 2.3 MG/DL (ref 1.9–2.7)
MCH RBC QN AUTO: 31.5 PG (ref 26.8–34.3)
MCHC RBC AUTO-ENTMCNC: 31 G/DL (ref 31.4–37.4)
MCV RBC AUTO: 102 FL (ref 82–98)
MONOCYTES # BLD AUTO: 0.45 THOUSAND/ΜL (ref 0.17–1.22)
MONOCYTES NFR BLD AUTO: 9 % (ref 4–12)
NEUTROPHILS # BLD AUTO: 3.9 THOUSANDS/ΜL (ref 1.85–7.62)
NEUTS SEG NFR BLD AUTO: 73 % (ref 43–75)
NRBC BLD AUTO-RTO: 0 /100 WBCS
P AXIS: 65 DEGREES
PLATELET # BLD AUTO: 95 THOUSANDS/UL (ref 149–390)
PLATELET BLD QL SMEAR: ABNORMAL
PMV BLD AUTO: 11.4 FL (ref 8.9–12.7)
POTASSIUM SERPL-SCNC: 4.1 MMOL/L (ref 3.5–5.3)
PR INTERVAL: 184 MS
PROT SERPL-MCNC: 6.9 G/DL (ref 6.4–8.4)
PROTHROMBIN TIME: 14.9 SECONDS (ref 12.3–15)
QRS AXIS: -53 DEGREES
QRSD INTERVAL: 164 MS
QT INTERVAL: 500 MS
QTC INTERVAL: 547 MS
RBC # BLD AUTO: 3.36 MILLION/UL (ref 3.88–5.62)
RBC MORPH BLD: PRESENT
SARS-COV+SARS-COV-2 AG RESP QL IA.RAPID: NEGATIVE
SODIUM SERPL-SCNC: 139 MMOL/L (ref 135–147)
T WAVE AXIS: 69 DEGREES
TSH SERPL DL<=0.05 MIU/L-ACNC: 1.59 UIU/ML (ref 0.45–4.5)
VENTRICULAR RATE: 72 BPM
WBC # BLD AUTO: 5.31 THOUSAND/UL (ref 4.31–10.16)

## 2024-12-15 PROCEDURE — 99285 EMERGENCY DEPT VISIT HI MDM: CPT

## 2024-12-15 PROCEDURE — 87040 BLOOD CULTURE FOR BACTERIA: CPT

## 2024-12-15 PROCEDURE — 84443 ASSAY THYROID STIM HORMONE: CPT

## 2024-12-15 PROCEDURE — 36415 COLL VENOUS BLD VENIPUNCTURE: CPT

## 2024-12-15 PROCEDURE — 72170 X-RAY EXAM OF PELVIS: CPT

## 2024-12-15 PROCEDURE — 83735 ASSAY OF MAGNESIUM: CPT

## 2024-12-15 PROCEDURE — 82948 REAGENT STRIP/BLOOD GLUCOSE: CPT

## 2024-12-15 PROCEDURE — 87804 INFLUENZA ASSAY W/OPTIC: CPT

## 2024-12-15 PROCEDURE — 99285 EMERGENCY DEPT VISIT HI MDM: CPT | Performed by: STUDENT IN AN ORGANIZED HEALTH CARE EDUCATION/TRAINING PROGRAM

## 2024-12-15 PROCEDURE — 72125 CT NECK SPINE W/O DYE: CPT

## 2024-12-15 PROCEDURE — 93005 ELECTROCARDIOGRAM TRACING: CPT

## 2024-12-15 PROCEDURE — 85730 THROMBOPLASTIN TIME PARTIAL: CPT

## 2024-12-15 PROCEDURE — 85610 PROTHROMBIN TIME: CPT

## 2024-12-15 PROCEDURE — 71045 X-RAY EXAM CHEST 1 VIEW: CPT

## 2024-12-15 PROCEDURE — 85025 COMPLETE CBC W/AUTO DIFF WBC: CPT

## 2024-12-15 PROCEDURE — 80053 COMPREHEN METABOLIC PANEL: CPT

## 2024-12-15 PROCEDURE — 83605 ASSAY OF LACTIC ACID: CPT

## 2024-12-15 PROCEDURE — 84484 ASSAY OF TROPONIN QUANT: CPT

## 2024-12-15 PROCEDURE — 70450 CT HEAD/BRAIN W/O DYE: CPT

## 2024-12-15 PROCEDURE — 87811 SARS-COV-2 COVID19 W/OPTIC: CPT

## 2024-12-15 PROCEDURE — 93010 ELECTROCARDIOGRAM REPORT: CPT | Performed by: INTERNAL MEDICINE

## 2024-12-15 NOTE — DISCHARGE INSTRUCTIONS
You are seen and evaluated emergency department for head injury after a fall down some stairs.    You had a CT scan of your head and neck that were negative for any broken bones (fractures) or bleeding in your brain.  They did show a hematoma to your left forehead.    Please follow-up with your primary care doctor.    Please continue dialysis as scheduled.    Please continue taking your blood thinner medications.    Please return to the emergency department for any worsening symptoms, including but not limited to: Not acting like himself, worsening headache, vomiting, unable to wake from sleep, or any new or worsening symptoms.

## 2024-12-15 NOTE — ED ATTENDING ATTESTATION
I, Dipika Umana MD, saw and evaluated the patient. I have discussed the patient with the resident/non-physician practitioner and agree with the resident's/non-physician practitioner's findings, Plan of Care, and MDM as documented in the resident's/non-physician practitioner's note, except where noted. All available labs and Radiology studies were reviewed.  I was present for key portions of any procedure(s) performed by the resident/non-physician practitioner and I was immediately available to provide assistance.       At this point I agree with the current assessment done in the Emergency Department.  I have conducted an independent evaluation of this patient a history and physical is as follows:    Subjective: 64-year-old male with history of ESRD on HD via left upper extremity fistula Monday/Wednesday/Friday, hypertension, diabetes, dementia presenting status post mechanical fall from 1 step stumbling and striking head on door without LOC.  Patient takes aspirin most recently taken yesterday morning.  He recently completed his full dialysis session on Friday without issues.  He reports some generalized headache without associated symptoms and some cuts to the left arm.  He denies any other injuries.  Son provides additional history and states that patient was outside in the cold for about 15 minutes while EMS providers were getting him ready to go into the ambulance.  Both son and patient deny recent infectious symptoms.    Objective: Vitals with hypothermia but otherwise stable and afebrile.  Primary survey intact, GCS 15, moving all extremities.  Secondary survey with hematoma with punctate abrasion to the left frontal scalp but no other signs of head trauma as well as skin tears to the left distal forearm and hand.    Assessment/Plan: Elderly male on dialysis presenting with head strike without LOC but on aspirin.  Primary intact, secondary with left frontal scalp hematoma and skin tears to the left upper  extremity no bony tenderness to palpation or significant deformities or neurovascular deficits on examination..  Vitals notable for hypothermia but patient denies recent infectious symptoms and was exposed to cold for prolonged . CT imaging of head and neck unremarkable.    Patient tolerated p.o. and ambulatory challenges in the emergency department and had stable vitals at time of discharge.      Patient was discharged to home with strict return precautions. Patient acknowledged understanding of plan and diagnostic results, and all their questions were answered to their satisfaction.

## 2024-12-15 NOTE — ED PROVIDER NOTES
ED Disposition       None          Assessment & Plan   {Hyperlinks  Risk Stratification - NIHSS - HEART SCORE - Fill out sepsis note and make sure you call [phone] if severe or septic shock:[id number]}    MDM  *** yo *** presented to ED for ***. Associated symptoms: ***. Exam findings: ***.      Differentials diagnoses considered: ***.     See ED course for more details on lab*** and imaging*** interpretation, as well as perinate information that occurred during patient's ED stay.  Based on these results and H&P,***. Results and clinical impressions were discussed with patient*** and family***. They expressed understanding. Plan: ***. This plan was also discussed with patient***, who was agreeable with this plan. Patient*** was given the opportunity to ask questions in ED. All questions and concerns were addressed in ED.     This document was created using speech voice recognition software.   Grammatical errors, random word insertions, pronoun errors, and incomplete sentences are an occasional consequence of this system due to software limitations, ambient noise, and hardware issues.   Any formal questions or concerns about content, text, or information contained within the body of this dictation should be directly addressed to the provider for clarification.       ED Course as of 12/15/24 0813   Sun Dec 15, 2024   0759 Temperature(!): 95.4 °F (35.2 °C)   0813 POC Glucose: 92       Medications - No data to display    ED Risk Strat Scores                                              History of Present Illness   {Hyperlinks  History (Med, Surg, Fam, Social) - Current Medications - Allergies  :[id number]}    Chief Complaint   Patient presents with    Fall     Fell down 8 stairs this morning. +HS, +aspirin. Per EMS, pt did not lose consciousness, pt slipped and tripped.        Past Medical History:   Diagnosis Date    Cerebrovascular accident (CVA) due to thrombosis of left middle cerebral artery (HCC) 07/29/2018     Chronic kidney disease     Coronary artery disease     Diabetes mellitus ([facility])     not on meds    Dialysis patient ([facility])     M-W-F    Fistula     left upper arm for hemodialyis    GERD (gastroesophageal reflux disease)     History of coronary artery stent placement     x3    Hypercholesteremia     Hyperlipidemia     Hypertension     Infectious viral hepatitis     B as child    Limb alert care status     no BP/IV left arm    Neuropathy     Nonhealing ulcer of heel ([facility]) 04/25/2023    Obesity     Osteomyelitis ([facility])     last assessed 11/4/16-per son not currently    Penetrating foot wound, left, initial encounter 01/19/2022    PVC's (premature ventricular contractions)     sees  Cardio    Stroke ([facility])     last weeof July 2018 [facility]    TIA (transient ischemic attack) 10/28/2018    Ulcer of left heel, limited to breakdown of skin ([facility]) 06/13/2024    Wears dentures     Wears glasses       Past Surgical History:   Procedure Laterality Date    ABDOMINAL SURGERY      CARDIAC CATHETERIZATION N/A 05/02/2022    Procedure: Cardiac Coronary Angiogram;  Surgeon: Sam Davis MD;  [facility];  Service: Cardiology    CARDIAC CATHETERIZATION N/A 05/02/2022    Procedure: Cardiac pci;  Surgeon: Sam Davis MD;  [facility];  Service: Cardiology    CARDIAC CATHETERIZATION  02/01/2023    Procedure: Cardiac catheterization;  Surgeon: Sam Davis MD;  [facility];  Service: Cardiology    CARDIAC CATHETERIZATION N/A 02/01/2023    Procedure: Cardiac pci;  Surgeon: Sam Davis MD;  [facility];  Service: Cardiology    CARDIAC CATHETERIZATION N/A 02/01/2023    Procedure: Cardiac Coronary Angiogram;  Surgeon: Sam Davis MD;  [facility];  Service: Cardiology    CARDIAC CATHETERIZATION N/A 02/01/2023    Procedure: Cardiac other-IVUS;  Surgeon: Sam Davis MD;  [facility];  Service: Cardiology     "CHOLECYSTECTOMY      Percutaneous    COLONOSCOPY      CYSTOSCOPY      HEMODIALYSIS ADULT  1/22/2024    HEMODIALYSIS ADULT  1/24/2024    IR LOWER EXTREMITY ANGIOGRAM  9/29/2023    IR LOWER EXTREMITY ANGIOGRAM  2/26/2024    OTHER SURGICAL HISTORY      \"stimulator to control bowel movements\"    ME ESOPHAGOGASTRODUODENOSCOPY TRANSORAL DIAGNOSTIC N/A 09/27/2016    Procedure: ESOPHAGOGASTRODUODENOSCOPY (EGD);  Surgeon: Adele Rowe MD;  Location: [facility] GI [facility];  Service: Gastroenterology    ME LAPAROSCOPY SURG CHOLECYSTECTOMY N/A 02/29/2016    Procedure: LAPAROSCOPIC CHOLECYSTECTOMY ;  Surgeon: Cliff Roman DO;  Location: [facility];  Service: General    ME SLCTV CATHJ 3RD+ ORD SLCTV ABDL PEL/LXTR BRNCH Left 9/29/2023    Procedure: Left leg angiogram with intervention;  Surgeon: Michelle Galvan MD;  Location: [facility];  Service: Vascular    ME SLCTV CATHJ 3RD+ ORD SLCTV ABDL PEL/LXTR BRNCH Left 2/26/2024    Procedure: Left leg angiogram antegrade access, left popliteal angioplasty;  Surgeon: Michelle Galvan MD;  Location: [facility];  Service: Vascular    ROTATOR CUFF REPAIR Right     SPINAL CORD STIMULATOR IMPLANT      \"Medtronic interstim model # 3058- in lower back to control bowel movements-currently turned off-battery is dead\"    TOE AMPUTATION Right 10/28/2016    Procedure: 3RD TOE AMPUTATION ;  Surgeon: Anjel Salas DPM;  Location: [facility];  Service:       Family History   Problem Relation Age of Onset    Leukemia Mother     Liver disease Mother     Lung cancer Mother         heavy smoker - 3 ppd    Heart disease Father     Liver disease Father     Diabetes type I Father     Multiple myeloma Sister     Breast cancer Sister     Urolithiasis Family     Alcohol abuse Neg Hx     Depression Neg Hx     Drug abuse Neg Hx     Substance Abuse Neg Hx     Mental illness Neg Hx       Social History     Tobacco Use    Smoking status: Never    Smokeless tobacco: Never   Vaping Use    Vaping status: Never " Used   Substance Use Topics    Alcohol use: Never    Drug use: No      E-Cigarette/Vaping    E-Cigarette Use Never User       E-Cigarette/Vaping Substances    Nicotine No     THC No     CBD No     Flavoring No     Other No     Unknown No       I have reviewed and agree with the history as documented.     HPI    Review of Systems        Objective   {Hyperlinks  Historical Vitals - Historical Labs - Chart Review/Microbiology - Last Echo - Code Status  :[id number]}    ED Triage Vitals [12/15/24 0751]   Temp Pulse Blood Pressure Respirations SpO2 Patient Position - Orthostatic VS   -- 71 135/61 16 97 % --      Temp src Heart Rate Source BP Location FiO2 (%) Pain Score    -- Monitor -- -- --      Vitals      Date and Time Temp Pulse SpO2 Resp BP Pain Score FACES Pain Rating User   12/15/24 0751 -- 71 97 % 16 135/61 -- -- LD            Physical Exam    Results Reviewed       None            No orders to display       Procedures    ED Medication and Procedure Management   Prior to Admission Medications   Prescriptions Last Dose Informant Patient Reported? Taking?   MIDODRINE HCL PO  Child Yes No   Sig: Take 5 mg by mouth PRN w/ dialysis   Sevelamer Carbonate 2.4 g PACK  Child Yes No   Sig: VIGOROUSLY MIX CONTENTS OF 1 PACKET IN WATER (IT WILL NOT DISSOLVE) AND DRINK 3 TIMES DAILY WITH MEALS   Vitamin D3 1.25 MG (25040 UT) capsule  Child No No   Sig: TAKE 1 CAPSULE BY MOUTH ONE TIME PER WEEK   apixaban (ELIQUIS) 5 mg  Child No No   Sig: Take 1 tablet (5 mg total) by mouth 2 (two) times a day   atorvastatin (LIPITOR) 40 mg tablet   No No   Sig: TAKE 1 TABLET BY MOUTH DAILY WITH DINNER   calcitriol (ROCALTROL) 0.5 MCG capsule  Child No No   Sig: Take 1 capsule (0.5 mcg total) by mouth 3 (three) times a week   cinacalcet (SENSIPAR) 60 MG tablet  Child No No   Sig: Take 2 tablets (120 mg total) by mouth 3 (three) times a week   divalproex sodium (DEPAKOTE SPRINKLE) 125 MG capsule  Child No No   Sig: Take 2 capsules (250 mg  total) by mouth every 12 (twelve) hours   metoprolol succinate (TOPROL-XL) 25 mg 24 hr tablet   No No   Sig: TAKE 1 TABLET BY MOUTH TWICE A DAY   mupirocin (BACTROBAN) 2 % ointment   No No   Sig: Apply topically 2 (two) times a day   prasugrel (EFFIENT) tablet   No No   Sig: TAKE 1 TABLET (5 MG TOTAL) BY MOUTH DAILY.   sacubitril-valsartan (Entresto) 24-26 MG TABS   No No   Sig: Take 1 tablet by mouth in the morning      Facility-Administered Medications: None     Patient's Medications   Discharge Prescriptions    No medications on file     No discharge procedures on file.  ED SEPSIS DOCUMENTATION          Source BP Location FiO2 (%) Pain Score    Rectal Monitor -- -- --      Vitals      Date and Time Temp Pulse SpO2 Resp BP Pain Score FACES Pain Rating User   12/15/24 1100 -- 66 90 % 16 112/56 -- -- LD   12/15/24 1030 -- 62 95 % 16 117/55 -- -- LD   12/15/24 1000 -- 64 93 % 18 119/59 -- -- LD   12/15/24 0930 -- 67 94 % 18 107/51 -- -- LD   12/15/24 0900 -- 63 94 % 18 104/52 -- -- LD   12/15/24 0845 -- 67 94 % 16  96/48 -- -- LD   12/15/24 0830 -- 65 94 % 16 102/46 -- -- LD   12/15/24 0815 -- 65 98 % 16 116/56 -- -- LD   12/15/24 0800 -- 66 98 % 16 121/57 -- -- LD   12/15/24 0751 95.4 °F (35.2 °C) 71 97 % 16 135/61 -- -- LD            Physical Exam  General appearance: resting comfortably, no acute distress   HENT: Normocephalic, hematoma to L frontal region with small abrasion, minimal tenderness, hearing grossly intact, and mucous membranes moist   Eyes: Conjunctiva normal, PERRL, EOM intact   Lungs/Chest: Respirations even and unlabored, breath sounds normal  Cardiovascular: Normal rate, regular rhythm  Abdomen: soft, non-distended, non-tender  Musculoskeletal: extremities normal, atraumatic, no cyanosis or edema   Pulses: radial / dorsalis pedis pulses palpable  Skin: warm and dry   Neurologic: Awake and alert, no apparent focal deficit  Awake, alert, oriented. No apparent focal deficit  Face symmetric, PERRL. EOM Intact  Speaks spontaneously, actively following commands.   Full strength, moving all extremities equally. Sensation intact to light touch B/L UE and LE.  Psych: Mood consistent with affect       Results Reviewed       Procedure Component Value Units Date/Time    Blood culture #1 [[id number]] Collected: 12/15/24 0818    Lab Status: Final result Specimen: Blood from Arm, Right Updated: 12/20/24 1201     Blood Culture No Growth After 5 Days.    Blood culture #2 [[id number]] Collected: 12/15/24 0818    Lab Status: Final result Specimen: Blood from Arm, Right Updated: 12/20/24 1201     Blood Culture No Growth  After 5 Days.    HS Troponin I 2hr [[id number]]  (Abnormal) Collected: 12/15/24 1023    Lab Status: Final result Specimen: Blood from Arm, Right Updated: 12/15/24 1048     hs TnI 2hr 73 ng/L      Delta 2hr hsTnI 0 ng/L     TSH, 3rd generation with Free T4 reflex [[id number]]  (Normal) Collected: 12/15/24 0818    Lab Status: Final result Specimen: Blood from Arm, Right Updated: 12/15/24 0901     TSH 3RD GENERATON 1.591 uIU/mL     CBC and differential [[id number]]  (Abnormal) Collected: 12/15/24 0818    Lab Status: Final result Specimen: Blood from Arm, Right Updated: 12/15/24 0855     WBC 5.31 Thousand/uL      RBC 3.36 Million/uL      Hemoglobin 10.6 g/dL      Hematocrit 34.2 %       fL      MCH 31.5 pg      MCHC 31.0 g/dL      RDW 15.8 %      MPV 11.4 fL      Platelets 95 Thousands/uL      nRBC 0 /100 WBCs      Segmented % 73 %      Immature Grans % 1 %      Lymphocytes % 15 %      Monocytes % 9 %      Eosinophils Relative 2 %      Basophils Relative 0 %      Absolute Neutrophils 3.90 Thousands/µL      Absolute Immature Grans 0.03 Thousand/uL      Absolute Lymphocytes 0.82 Thousands/µL      Absolute Monocytes 0.45 Thousand/µL      Eosinophils Absolute 0.09 Thousand/µL      Basophils Absolute 0.02 Thousands/µL     Narrative:      This is an appended report.  These results have been appended to a previously verified report.    Smear Review(Phlebs Do Not Order) [[id number]]  (Abnormal) Collected: 12/15/24 0818    Lab Status: Final result Specimen: Blood from Arm, Right Updated: 12/15/24 0855     RBC Morphology Present     Platelet Estimate Borderline     Anisocytosis Present    HS Troponin 0hr (reflex protocol) [[id number]]  (Abnormal) Collected: 12/15/24 0818    Lab Status: Final result Specimen: Blood from Arm, Right Updated: 12/15/24 0853     hs TnI 0hr 73 ng/L     Comprehensive metabolic panel [[id number]]  (Abnormal) Collected: 12/15/24 0818    Lab Status: Final result Specimen: Blood from Arm, Right Updated:  12/15/24 0846     Sodium 139 mmol/L      Potassium 4.1 mmol/L      Chloride 96 mmol/L      CO2 32 mmol/L      ANION GAP 11 mmol/L      BUN 31 mg/dL      Creatinine 6.73 mg/dL      Glucose 98 mg/dL      Calcium 8.2 mg/dL      AST 8 U/L      ALT 7 U/L      Alkaline Phosphatase 101 U/L      Total Protein 6.9 g/dL      Albumin 4.0 g/dL      Total Bilirubin 0.82 mg/dL      eGFR 7 ml/min/1.73sq m     Narrative:      National Kid[employer] Disease Foundation guidelines for Chronic Kidney Disease (CKD):     Stage 1 with normal or high GFR (GFR > 90 mL/min/1.73 square meters)    Stage 2 Mild CKD (GFR = 60-89 mL/min/1.73 square meters)    Stage 3A Moderate CKD (GFR = 45-59 mL/min/1.73 square meters)    Stage 3B Moderate CKD (GFR = 30-44 mL/min/1.73 square meters)    Stage 4 Severe CKD (GFR = 15-29 mL/min/1.73 square meters)    Stage 5 End Stage CKD (GFR <15 mL/min/1.73 square meters)  Note: GFR calculation is accurate only with a steady state creatinine    Magnesium [[id number]]  (Normal) Collected: 12/15/24 0818    Lab Status: Final result Specimen: Blood from Arm, Right Updated: 12/15/24 0846     Magnesium 2.3 mg/dL     Lactic acid, plasma (w/reflex if result > 2.0) [[id number]]  (Normal) Collected: 12/15/24 0818    Lab Status: Final result Specimen: Blood from Arm, Right Updated: 12/15/24 0845     LACTIC ACID 1.0 mmol/L     Narrative:      Result may be elevated if tourniquet was used during collection.    FLU/COVID Rapid Antigen (30 min. TAT) - Preferred screening test in ED [[id number]]  (Normal) Collected: 12/15/24 0818    Lab Status: Final result Specimen: Nares from Nose Updated: 12/15/24 0841     SARS COV Rapid Antigen Negative     Influenza A Rapid Antigen Negative     Influenza B Rapid Antigen Negative    Narrative:      This test has been performed using the Trivop Tamika 2 FLU+SARS Antigen test under the Emergency Use Authorization (EUA). This test has been validated by the  and verified by the performing  laboratory. The Tamika uses lateral flow immunofluorescent sandwich assay to detect SARS-COV, Influenza A and Influenza B Antigen.     The Quidel Tamika 2 SARS Antigen test does not differentiate between SARS-CoV and SARS-CoV-2.     Negative results are presumptive and may be confirmed with a molecular assay, if necessary, for patient management. Negative results do not rule out SARS-CoV-2 or influenza infection and should not be used as the sole basis for treatment or patient management decisions. A negative test result may occur if the level of antigen in a sample is below the limit of detection of this test.     Positive results are indicative of the presence of viral antigens, but do not rule out bacterial infection or co-infection with other viruses.     All test results should be used as an adjunct to clinical observations and other information available to the provider.    FOR PEDIATRIC PATIENTS - copy/paste COVID Guidelines URL to browser: [email or web address]    Protime-INR [[id number]]  (Normal) Collected: 12/15/24 0818    Lab Status: Final result Specimen: Blood from Arm, Right Updated: 12/15/24 0838     Protime 14.9 seconds      INR 1.10    Narrative:      INR Therapeutic Range    Indication                                             INR Range      Atrial Fibrillation                                               2.0-3.0  Hypercoagulable State                                    2.0.2.3  Left Ventricular Asist Device                            2.0-3.0  Mechanical Heart Valve                                  -    Aortic(with afib, MI, embolism, HF, LA enlargement,    and/or coagulopathy)                                     2.0-3.0 (2.5-3.5)     Mitral                                                             2.5-3.5  Prosthetic/Bioprosthetic Heart Valve               2.0-3.0  Venous thromboembolism (VTE: VT, PE        2.0-3.0    APTT [[id number]]   (Normal) Collected: 12/15/24 0818    Lab Status: Final result Specimen: Blood from Arm, Right Updated: 12/15/24 0838     PTT 31 seconds     Fingerstick Glucose (POCT) [[id number]]  (Normal) Collected: 12/15/24 0810    Lab Status: Final result Specimen: Blood Updated: 12/15/24 0812     POC Glucose 92 mg/dl     Procalcitonin [[id number]]     Lab Status: No result Specimen: Blood from Arm, Right             CT head without contrast   Final Interpretation by Anselmo Devi MD (12/15 0928)      No acute intracranial abnormality.  Stable chronic microangiopathic changes within the brain.   Moderate left frontal scalp hematoma without calvarial fracture.                  Workstation performed: [id number]         CT cervical spine without contrast   Final Interpretation by Anselmo Devi MD (12/15 0926)      No cervical spine fracture or traumatic malalignment.                  Workstation performed: [id number]         XR chest 1 view portable   ED Interpretation by Julissa Luna DO (12/15 0904)   When compared to prior improvement in pulmonary edema.  Questionable cephalization of vessels.  Otherwise unremarkable.      Final Interpretation by Jamaica Ng MD (12/15 1236)      Mild pulmonary vascular congestion. No consolidation, pleural effusion or pneumothorax.            Workstation performed: [id number]         XR pelvis ap only 1 or 2 views   ED Interpretation by Julissa Luna DO (12/15 0904)   No acute fracture or osseous abnormality visualized.      Final Interpretation by Jamaica Ng MD (12/15 1238)      No acute osseous abnormality.         Computerized Assisted Algorithm (CAA) may have been used to analyze all applicable images.         Workstation performed: [id number]             ECG 12 Lead Documentation Only    Date/Time: 12/15/2024 8:09 AM    Performed by: Julissa Luna DO  Authorized by: Julissa Luna DO    Patient location:  ED  Previous ECG:      Previous ECG:  Compared to current    Similarity:  No change  Interpretation:     Interpretation: abnormal    Rate:     ECG rate:  72  Rhythm:     Rhythm: sinus rhythm    Ectopy:     Ectopy: none    QRS:     QRS axis:  Normal    QRS intervals:  Wide  Conduction:     Conduction: abnormal      Abnormal conduction: complete RBBB, LAFB and bifascicular block        ED Medication and Procedure Management   Prior to Admission Medications   Prescriptions Last Dose Informant Patient Reported? Taking?   MIDODRINE HCL PO  Child Yes No   Sig: Take 5 mg by mouth PRN w/ dialysis   Sevelamer Carbonate 2.4 g PACK  Child Yes No   Sig: VIGOROUSLY MIX CONTENTS OF 1 PACKET IN WATER (IT WILL NOT DISSOLVE) AND DRINK 3 TIMES DAILY WITH MEALS   Vitamin D3 1.25 MG (27969 UT) capsule  Child No No   Sig: TAKE 1 CAPSULE BY MOUTH ONE TIME PER WEEK   Patient taking differently: Sundays night   atorvastatin (LIPITOR) 40 mg tablet   No No   Sig: TAKE 1 TABLET BY MOUTH DAILY WITH DINNER   calcitriol (ROCALTROL) 0.5 MCG capsule  Child No No   Sig: Take 1 capsule (0.5 mcg total) by mouth 3 (three) times a week   cinacalcet (SENSIPAR) 60 MG tablet  Child No No   Sig: Take 2 tablets (120 mg total) by mouth 3 (three) times a week   divalproex sodium (DEPAKOTE SPRINKLE) 125 MG capsule  Child No No   Sig: Take 2 capsules (250 mg total) by mouth every 12 (twelve) hours   Patient taking differently: Take 125 mg by mouth every 12 (twelve) hours   prasugrel (EFFIENT) tablet   No No   Sig: TAKE 1 TABLET (5 MG TOTAL) BY MOUTH DAILY.   sacubitril-valsartan (Entresto) 24-26 MG TABS   No No   Sig: Take 1 tablet by mouth in the morning   Patient taking differently: Take 1 tablet by mouth daily at bedtime      Facility-Administered Medications: None     Discharge Medication List as of 12/15/2024 11:22 AM        CONTINUE these medications which have NOT CHANGED    Details   atorvastatin (LIPITOR) 40 mg tablet TAKE 1 TABLET BY MOUTH DAILY WITH DINNER, Starting Mon  9/9/2024, Normal      calcitriol (ROCALTROL) 0.5 MCG capsule Take 1 capsule (0.5 mcg total) by mouth 3 (three) times a week, Starting Wed 7/17/2024, Normal      cinacalcet (SENSIPAR) 60 MG tablet Take 2 tablets (120 mg total) by mouth 3 (three) times a week, Starting Wed 7/17/2024, Normal      divalproex sodium (DEPAKOTE SPRINKLE) 125 MG capsule Take 2 capsules (250 mg total) by mouth every 12 (twelve) hours, Starting Tue 7/23/2024, Normal      MIDODRINE HCL PO Take 5 mg by mouth PRN w/ dialysis, Starting Fri 8/9/2024, Until Fri 8/8/2025 at 2359, Historical Med      prasugrel (EFFIENT) tablet TAKE 1 TABLET (5 MG TOTAL) BY MOUTH DAILY., Starting Tue 11/12/2024, Normal      sacubitril-valsartan (Entresto) 24-26 MG TABS Take 1 tablet by mouth in the morning, Starting Thu 10/17/2024, Normal      Sevelamer Carbonate 2.4 g PACK VIGOROUSLY MIX CONTENTS OF 1 PACKET IN WATER (IT WILL NOT DISSOLVE) AND DRINK 3 TIMES DAILY WITH MEALS, Historical Med      Vitamin D3 1.25 MG (67407 UT) capsule TAKE 1 CAPSULE BY MOUTH ONE TIME PER WEEK, Normal      apixaban (ELIQUIS) 5 mg Take 1 tablet (5 mg total) by mouth 2 (two) times a day, Starting Thu 8/15/2024, Normal      metoprolol succinate (TOPROL-XL) 25 mg 24 hr tablet TAKE 1 TABLET BY MOUTH TWICE A DAY, Starting Mon 10/7/2024, Normal      mupirocin (BACTROBAN) 2 % ointment Apply topically 2 (two) times a day, Starting Thu 11/7/2024, Normal           No discharge procedures on file.  ED SEPSIS DOCUMENTATION   Time reflects when diagnosis was documented in both MDM as applicable and the Disposition within this note       Time User Action Codes Description Comment    12/15/2024 11:19 AM Julissa Luna Add [W19.XXXA] Fall, initial encounter     12/15/2024 11:19 AM Julissa Luna Add [S09.90XA] Closed head injury, initial encounter     12/15/2024 11:19 AM Julissa Luna Add [S00.03XA] Hematoma of frontal scalp, initial encounter                  Julissa Luna,   12/26/24  [id number]

## 2024-12-15 NOTE — ED NOTES
Pt ambulated in hallway with rolling walker. Pt has no complaints and demonstrates steady gait.     Mic Fitzpatrick  12/15/24 2324

## 2024-12-16 ENCOUNTER — VBI (OUTPATIENT)
Dept: FAMILY MEDICINE CLINIC | Facility: CLINIC | Age: 64
End: 2024-12-16

## 2024-12-16 NOTE — TELEPHONE ENCOUNTER
12/16/24 11:06 AM    Patient contacted post ED visit, first outreach attempt made. Message was left for patient to return a call to the [facility] Department at [facility]: Phone [phone].    Thank you.  CARLITA RODRIGUEZ MA  [facility]

## 2024-12-17 ENCOUNTER — APPOINTMENT (OUTPATIENT)
Facility: CLINIC | Age: 64
End: 2024-12-17
Payer: MEDICARE

## 2024-12-17 NOTE — TELEPHONE ENCOUNTER
12/17/24 9:19 AM    Patient contacted post ED visit, VBI department spoke with patient/caregiver and outreach was successful.    Thank you.  CARLITA RODRIGUEZ MA  [facility]

## 2024-12-18 ENCOUNTER — APPOINTMENT (OUTPATIENT)
Facility: CLINIC | Age: 64
End: 2024-12-18
Payer: MEDICARE

## 2024-12-19 ENCOUNTER — TELEPHONE (OUTPATIENT)
Facility: CLINIC | Age: 64
End: 2024-12-19

## 2024-12-19 ENCOUNTER — APPOINTMENT (OUTPATIENT)
Facility: CLINIC | Age: 64
End: 2024-12-19
Payer: MEDICARE

## 2024-12-19 ENCOUNTER — OFFICE VISIT (OUTPATIENT)
Dept: FAMILY MEDICINE CLINIC | Facility: CLINIC | Age: 64
End: 2024-12-19
Payer: MEDICARE

## 2024-12-19 VITALS
TEMPERATURE: 98.6 F | DIASTOLIC BLOOD PRESSURE: 78 MMHG | SYSTOLIC BLOOD PRESSURE: 122 MMHG | HEIGHT: 69 IN | WEIGHT: 210 LBS | BODY MASS INDEX: 31.1 KG/M2

## 2024-12-19 DIAGNOSIS — Z00.00 MEDICARE ANNUAL WELLNESS VISIT, SUBSEQUENT: Primary | ICD-10-CM

## 2024-12-19 DIAGNOSIS — D69.6 THROMBOCYTOPENIA (HCC): ICD-10-CM

## 2024-12-19 DIAGNOSIS — Z12.5 SCREENING FOR PROSTATE CANCER: ICD-10-CM

## 2024-12-19 DIAGNOSIS — I82.591 CHRONIC DEEP VEIN THROMBOSIS (DVT) OF OTHER VEIN OF RIGHT LOWER EXTREMITY (HCC): ICD-10-CM

## 2024-12-19 PROCEDURE — G0439 PPPS, SUBSEQ VISIT: HCPCS

## 2024-12-19 NOTE — TELEPHONE ENCOUNTER
Called and spoke to pts son re: missed apt. Reports pt fell this past weekend, went to ED and did not have any injuries but still very bruised and sore. States they will try to come to next scheduled apt next Monday 12/23

## 2024-12-19 NOTE — PROGRESS NOTES
Name: Asad Galloway      : 1960      MRN: [id number]  Encounter Provider: BRITTANY Allen  Encounter Date: 2024   Encounter department: [facility]    Assessment & Plan  Medicare annual wellness visit, subsequent  CPE done, family reports declining health,  pt current with colonoscopy, PSA ordered. Pt had flu vaccine for current season received at dialysis. One fall in the last 6 months counseled on assistive device use, proper foot wear continue physical therapy.       Screening for prostate cancer    Orders:  •  PSA, Total Screen    Thrombocytopenia (HCC)  Follows with hematology/oncology, ESRD on HD.        Chronic deep vein thrombosis (DVT) of other vein of right lower extremity (HCC)  Foll with hematology, per daughter no longer on Apixapan 5 mg bid, denies lower extremity pain or swelling will see prn.         Depression Screening and Follow-up Plan: Patient was screened for depression during today's encounter. They screened negative with a PHQ-9 score of 3.      Preventive health issues were discussed with patient, and age appropriate screening tests were ordered as noted in patient's After Visit Summary. Personalized health advice and appropriate referrals for health education or preventive services given if needed, as noted in patient's After Visit Summary.    History of Present Illness     Pt and family presents for MAW had fall on steps 24 was evaluated in ED without acute findings. Pt has bruising to  left side of face and L upper extremity, denies pain or swelling. Per family pt is  no longer taking Apixaban 5 mg bid or ASA. Pt is ESRD on hemodialysis MWF., BS controlled not on medication therapy.       Patient Care Team:  BRITTANY Allen as PCP - General (Family Medicine)  MD Lissette Mishra PA-C as Physician Assistant (Endocrinology)  Franklin Church DPM (Podiatry)  Michelle Galvan MD (Vascular Surgery)  Jaden  MD Kenzie (Endocrinology)  Dayanara Holbrook PA-C as Physician Assistant (Hematology and Oncology)  Britney Tan MD (Cardiology)    Review of Systems   Constitutional:  Positive for fatigue. Negative for activity change, appetite change, chills, diaphoresis, fever and unexpected weight change.   HENT:  Negative for congestion, dental problem, drooling, ear discharge, ear pain, facial swelling, hearing loss, mouth sores, nosebleeds, postnasal drip, rhinorrhea, sinus pressure, sinus pain, sneezing, sore throat, tinnitus, trouble swallowing and voice change.    Eyes:  Negative for photophobia, pain, discharge, redness, itching and visual disturbance.   Respiratory:  Negative for apnea, cough, choking, chest tightness, shortness of breath, wheezing and stridor.    Cardiovascular:  Negative for chest pain, palpitations and leg swelling.   Gastrointestinal:  Negative for abdominal distention, abdominal pain, anal bleeding, blood in stool, constipation, diarrhea, nausea, rectal pain and vomiting.   Endocrine: Negative for cold intolerance, heat intolerance, polydipsia, polyphagia and polyuria.   Genitourinary:  Negative for decreased urine volume, difficulty urinating, dysuria, flank pain, frequency, hematuria, penile discharge, scrotal swelling, testicular pain and urgency.   Musculoskeletal:  Positive for arthralgias and gait problem. Negative for back pain, joint swelling, myalgias, neck pain and neck stiffness.   Skin:  Positive for color change. Negative for pallor, rash and wound.   Allergic/Immunologic: Negative for environmental allergies, food allergies and immunocompromised state.   Neurological:  Negative for dizziness, tremors, seizures, syncope, facial asymmetry, weakness, light-headedness, numbness and headaches.   Hematological:  Negative for adenopathy. Does not bruise/bleed easily.   Psychiatric/Behavioral:  Negative for agitation, behavioral problems, confusion, decreased concentration, dysphoric  mood, hallucinations, self-injury, sleep disturbance and suicidal ideas. The patient is not nervous/anxious and is not hyperactive.    All other systems reviewed and are negative.    Medical History Reviewed by provider this encounter:  [facility]       Annual Wellness Visit Questionnaire   Asad is here for his Subsequent Wellness visit. Last Medicare Wellness visit information reviewed, patient interviewed and updates made to the record today.      Health Risk Assessment:   Patient rates overall health as poor. Patient feels that their physical health rating is same. Patient is satisfied with their life. Eyesight was rated as much better. Hearing was rated as same. Patient feels that their emotional and mental health rating is same. Patients states they are never, rarely angry. Patient states they are sometimes unusually tired/fatigued. Pain experienced in the last 7 days has been a lot. Patient's pain rating has been 8/10. Patient states that he has experienced no weight loss or gain in last 6 months.     Depression Screening:   PHQ-9 Score: 3      Fall Risk Screening:   In the past year, patient has experienced: history of falling in past year    Number of falls: 1  Injured during fall?: Yes    Feels unsteady when standing or walking?: No    Worried about falling?: Yes      Home Safety:  Patient does not have trouble with stairs inside or outside of their home. Patient has working smoke alarms and has working carbon monoxide detector. Home safety hazards include: none.     Nutrition:   Current diet is Regular.     Medications:   Patient is not currently taking any over-the-counter supplements. Patient is not able to manage medications.     Activities of Daily Living (ADLs)/Instrumental Activities of Daily Living (IADLs):   Walk and transfer into and out of bed and chair?: No  Dress and groom yourself?: No    Bathe or shower yourself?: No    Feed yourself? No  Do your laundry/housekeeping?: No  Manage your money,  pay your bills and track your expenses?: No  Make your own meals?: No    Do your own shopping?: No    Previous Hospitalizations:   Any hospitalizations or ED visits within the last 12 months?: Yes    How many hospitalizations have you had in the last year?: 1-2    Advance Care Planning:   Living will: Yes    Durable POA for healthcare: Yes    Advanced directive: Yes    Advanced directive counseling given: Yes    Five wishes given: No    End of Life Decisions reviewed with patient: Yes      Cognitive Screening:   Provider or family/friend/caregiver concerned regarding cognition?: No    PREVENTIVE SCREENINGS      Cardiovascular Screening:    General: Screening Not Indicated and History Lipid Disorder      Diabetes Screening:     General: History Diabetes and Screening Current      Colorectal Cancer Screening:     General: Screening Current      Lung Cancer Screening:     General: Screening Not Indicated      Hepatitis C Screening:    General: Screening Current    Screening, Brief Intervention, and Referral to Treatment (SBIRT)    Screening  Typical number of drinks in a day: 0  Typical number of drinks in a week: 0  Interpretation: Low risk drinking behavior.    Single Item Drug Screening:  How often have you used an illegal drug (including marijuana) or a prescription medication for non-medical reasons in the past year? never    Single Item Drug Screen Score: 0  Interpretation: Negative screen for possible drug use disorder    Brief Intervention  Alcohol & drug use screenings were reviewed. No concerns regarding substance use disorder identified.     Other Counseling Topics:   Calcium and vitamin D intake and regular weightbearing exercise.     Social Drivers of Health     Financial Resource Strain: Patient Declined (7/13/2023)    Overall Financial Resource Strain (CARDIA)    • Difficulty of Paying Living Expenses: Patient declined   Food Insecurity: No Food Insecurity (12/19/2024)    Hunger Vital Sign    • Worried  "About Running Out of Food in the Last Year: Never true    • Ran Out of Food in the Last Year: Never true   Transportation Needs: No Transportation Needs (12/19/2024)    PRAPARE - Transportation    • Lack of Transportation (Medical): No    • Lack of Transportation (Non-Medical): No   Housing Stability: Low Risk  (12/19/2024)    Housing Stability Vital Sign    • Unable to Pay for Housing in the Last Year: No    • Number of Times Moved in the Last Year: 0    • Homeless in the Last Year: No   Utilities: Not At Risk (12/19/2024)    [facility] Utilities    • Threatened with loss of utilities: No     No results found.    Objective   /78 (BP Location: Right arm, Patient Position: Sitting, Cuff Size: Standard)   Temp 98.6 °F (37 °C) (Tympanic)   Ht 5' 9\" (1.753 m)   Wt 95.3 kg (210 lb)   BMI 31.01 kg/m²     Physical Exam  Vitals and nursing note reviewed.   Constitutional:       General: He is not in acute distress.     Appearance: Normal appearance. He is ill-appearing. He is not toxic-appearing or diaphoretic.   HENT:      Head: Normocephalic and atraumatic.      Right Ear: Tympanic membrane, ear canal and external ear normal. There is no impacted cerumen.      Left Ear: Tympanic membrane, ear canal and external ear normal. There is no impacted cerumen.      Nose: Nose normal. No congestion or rhinorrhea.      Mouth/Throat:      Mouth: Mucous membranes are moist.      Pharynx: Oropharynx is clear. No oropharyngeal exudate or posterior oropharyngeal erythema.   Eyes:      General: No visual field deficit or scleral icterus.        Right eye: No discharge.         Left eye: No discharge.      Extraocular Movements: Extraocular movements intact.      Conjunctiva/sclera: Conjunctivae normal.      Pupils: Pupils are equal, round, and reactive to light.   Neck:      Vascular: No carotid bruit.   Cardiovascular:      Rate and Rhythm: Normal rate and regular rhythm.      Pulses: Normal pulses.      Heart sounds: Normal heart " sounds. No murmur heard.     No friction rub. No gallop.   Pulmonary:      Effort: Pulmonary effort is normal. No respiratory distress.      Breath sounds: Normal breath sounds. No stridor. No wheezing, rhonchi or rales.   Chest:      Chest wall: No tenderness.   Abdominal:      General: Bowel sounds are normal. There is no distension.      Palpations: Abdomen is soft. There is no mass.      Tenderness: There is no abdominal tenderness. There is no right CVA tenderness, left CVA tenderness, guarding or rebound.      Hernia: No hernia is present.   Musculoskeletal:         General: No swelling, tenderness, deformity or signs of injury. Normal range of motion.      Cervical back: Normal range of motion and neck supple. No rigidity or tenderness.      Right lower leg: No edema.      Left lower leg: No edema.   Lymphadenopathy:      Cervical: No cervical adenopathy.   Skin:     General: Skin is warm.      Capillary Refill: Capillary refill takes less than 2 seconds.      Coloration: Skin is not jaundiced or pale.      Findings: Bruising and ecchymosis present. No erythema, lesion or rash.      Comments: L facial and upper extremity bruising and ecchymosis   Neurological:      General: No focal deficit present.      Mental Status: He is alert and oriented to person, place, and time.      Cranial Nerves: No cranial nerve deficit, dysarthria or facial asymmetry.      Sensory: No sensory deficit.      Motor: Weakness (b/l lower extremity) present. No tremor, atrophy, abnormal muscle tone, seizure activity or pronator drift.      Coordination: Coordination abnormal.      Gait: Gait abnormal (uses cane).      Deep Tendon Reflexes: Reflexes normal.   Psychiatric:         Mood and Affect: Mood normal.         Behavior: Behavior normal.         Thought Content: Thought content normal.         Judgment: Judgment normal.

## 2024-12-19 NOTE — PATIENT INSTRUCTIONS
Medicare Preventive Visit Patient Instructions  Thank you for completing your Welcome to Medicare Visit or Medicare Annual Wellness Visit today. Your next wellness visit will be due in one year (12/20/2025).  The screening/preventive services that you may require over the next 5-10 years are detailed below. Some tests may not apply to you based off risk factors and/or age. Screening tests ordered at today's visit but not completed yet may show as past due. Also, please note that scanned in results may not display below.  Preventive Screenings:  Service Recommendations Previous Testing/Comments   Colorectal Cancer Screening  Colonoscopy    Fecal Occult Blood Test (FOBT)/Fecal Immunochemical Test (FIT)  Fecal DNA/Cologuard Test  Flexible Sigmoidoscopy Age: 45-75 years old   Colonoscopy: every 10 years (May be performed more frequently if at higher risk)  OR  FOBT/FIT: every 1 year  OR  Cologuard: every 3 years  OR  Sigmoidoscopy: every 5 years  Screening may be recommended earlier than age 45 if at higher risk for colorectal cancer. Also, an individualized decision between you and your healthcare provider will decide whether screening between the ages of 76-85 would be appropriate. Colonoscopy: 07/19/2019  FOBT/FIT: Not on file  Cologuard: Not on file  Sigmoidoscopy: Not on file    Screening Current     Prostate Cancer Screening Individualized decision between patient and health care provider in men between ages of 55-69   Medicare will cover every 12 months beginning on the day after your 50th birthday PSA: 1.2 ng/mL           Hepatitis C Screening Once for adults born between 1945 and 1965  More frequently in patients at high risk for Hepatitis C Hep C Antibody: 01/30/2023    Screening Current   Diabetes Screening 1-2 times per year if you're at risk for diabetes or have pre-diabetes Fasting glucose: 85 mg/dL (2/22/2024)  A1C: 5.5 (11/7/2024)  Screening Not Indicated  History Diabetes   Cholesterol Screening Once  every 5 years if you don't have a lipid disorder. May order more often based on risk factors. Lipid panel: 01/30/2023  Screening Not Indicated  History Lipid Disorder      Other Preventive Screenings Covered by Medicare:  Abdominal Aortic Aneurysm (AAA) Screening: covered once if your at risk. You're considered to be at risk if you have a family history of AAA or a male between the age of 65-75 who smoking at least 100 cigarettes in your lifetime.  Lung Cancer Screening: covers low dose CT scan once per year if you meet all of the following conditions: (1) Age 55-77; (2) No signs or symptoms of lung cancer; (3) Current smoker or have quit smoking within the last 15 years; (4) You have a tobacco smoking history of at least 20 pack years (packs per day x number of years you smoked); (5) You get a written order from a healthcare provider.  Glaucoma Screening: covered annually if you're considered high risk: (1) You have diabetes OR (2) Family history of glaucoma OR (3)  aged 50 and older OR (4)  American aged 65 and older  Osteoporosis Screening: covered every 2 years if you meet one of the following conditions: (1) Have a vertebral abnormality; (2) On glucocorticoid therapy for more than 3 months; (3) Have primary hyperparathyroidism; (4) On osteoporosis medications and need to assess response to drug therapy.  HIV Screening: covered annually if you're between the age of 15-65. Also covered annually if you are younger than 15 and older than 65 with risk factors for HIV infection. For pregnant patients, it is covered up to 3 times per pregnancy.    Immunizations:  Immunization Recommendations   Influenza Vaccine Annual influenza vaccination during flu season is recommended for all persons aged >= 6 months who do not have contraindications   Pneumococcal Vaccine   * Pneumococcal conjugate vaccine = PCV13 (Prevnar 13), PCV15 (Vaxneuvance), PCV20 (Prevnar 20)  * Pneumococcal polysaccharide vaccine  = PPSV23 (Pneumovax) Adults 19-63 yo with certain risk factors or if 65+ yo  If never received any pneumonia vaccine: recommend Prevnar 20 (PCV20)  Give PCV20 if previously received 1 dose of PCV13 or PPSV23   Hepatitis B Vaccine 3 dose series if at intermediate or high risk (ex: diabetes, end stage renal disease, liver disease)   Respiratory syncytial virus (RSV) Vaccine - COVERED BY MEDICARE PART D  * RSVPreF3 (Arexvy) CDC recommends that adults 60 years of age and older may receive a single dose of RSV vaccine using shared clinical decision-making (SCDM)   Tetanus (Td) Vaccine - COST NOT COVERED BY MEDICARE PART B Following completion of primary series, a booster dose should be given every 10 years to maintain immunity against tetanus. Td may also be given as tetanus wound prophylaxis.   Tdap Vaccine - COST NOT COVERED BY MEDICARE PART B Recommended at least once for all adults. For pregnant patients, recommended with each pregnancy.   Shingles Vaccine (Shingrix) - COST NOT COVERED BY MEDICARE PART B  2 shot series recommended in those 19 years and older who have or will have weakened immune systems or those 50 years and older     Health Maintenance Due:      Topic Date Due   • Colorectal Cancer Screening  07/19/2029   • HIV Screening  Completed   • Hepatitis C Screening  Completed     Immunizations Due:      Topic Date Due   • Hepatitis A Vaccine (1 of 2 - Risk 2-dose series) Never done   • Hepatitis B Vaccine (5 of 5 - Risk Dialysis Recombivax 3-dose series) 11/26/2022   • COVID-19 Vaccine (6 - 2024-25 season) 09/01/2024     Advance Directives   What are advance directives?  Advance directives are legal documents that state your wishes and plans for medical care. These plans are made ahead of time in case you lose your ability to make decisions for yourself. Advance directives can apply to any medical decision, such as the treatments you want, and if you want to donate organs.   What are the types of advance  directives?  There are many types of advance directives, and each state has rules about how to use them. You may choose a combination of any of the following:  Living will:  This is a written record of the treatment you want. You can also choose which treatments you do not want, which to limit, and which to stop at a certain time. This includes surgery, medicine, IV fluid, and tube feedings.   Durable power of  for healthcare (DPAHC):  This is a written record that states who you want to make healthcare choices for you when you are unable to make them for yourself. This person, called a proxy, is usually a family member or a friend. You may choose more than 1 proxy.  Do not resuscitate (DNR) order:  A DNR order is used in case your heart stops beating or you stop breathing. It is a request not to have certain forms of treatment, such as CPR. A DNR order may be included in other types of advance directives.  Medical directive:  This covers the care that you want if you are in a coma, near death, or unable to make decisions for yourself. You can list the treatments you want for each condition. Treatment may include pain medicine, surgery, blood transfusions, dialysis, IV or tube feedings, and a ventilator (breathing machine).  Values history:  This document has questions about your views, beliefs, and how you feel and think about life. This information can help others choose the care that you would choose.  Why are advance directives important?  An advance directive helps you control your care. Although spoken wishes may be used, it is better to have your wishes written down. Spoken wishes can be misunderstood, or not followed. Treatments may be given even if you do not want them. An advance directive may make it easier for your family to make difficult choices about your care.   Weight Management   Why it is important to manage your weight:  Being overweight increases your risk of health conditions such as  heart disease, high blood pressure, type 2 diabetes, and certain types of cancer. It can also increase your risk for osteoarthritis, sleep apnea, and other respiratory problems. Aim for a slow, steady weight loss. Even a small amount of weight loss can lower your risk of health problems.  How to lose weight safely:  A safe and healthy way to lose weight is to eat fewer calories and get regular exercise. You can lose up about 1 pound a week by decreasing the number of calories you eat by 500 calories each day.   Healthy meal plan for weight management:  A healthy meal plan includes a variety of foods, contains fewer calories, and helps you stay healthy. A healthy meal plan includes the following:  Eat whole-grain foods more often.  A healthy meal plan should contain fiber. Fiber is the part of grains, fruits, and vegetables that is not broken down by your body. Whole-grain foods are healthy and provide extra fiber in your diet. Some examples of whole-grain foods are whole-wheat breads and pastas, oatmeal, brown rice, and bulgur.  Eat a variety of vegetables every day.  Include dark, leafy greens such as spinach, kale, art greens, and mustard greens. Eat yellow and orange vegetables such as carrots, sweet potatoes, and winter squash.   Eat a variety of fruits every day.  Choose fresh or canned fruit (canned in its own juice or light syrup) instead of juice. Fruit juice has very little or no fiber.  Eat low-fat dairy foods.  Drink fat-free (skim) milk or 1% milk. Eat fat-free yogurt and low-fat cottage cheese. Try low-fat cheeses such as mozzarella and other reduced-fat cheeses.  Choose meat and other protein foods that are low in fat.  Choose beans or other legumes such as split peas or lentils. Choose fish, skinless poultry (chicken or turkey), or lean cuts of red meat (beef or pork). Before you cook meat or poultry, cut off any visible fat.   Use less fat and oil.  Try baking foods instead of frying them. Add  less fat, such as margarine, sour cream, regular salad dressing and mayonnaise to foods. Eat fewer high-fat foods. Some examples of high-fat foods include french fries, doughnuts, ice cream, and cakes.  Eat fewer sweets.  Limit foods and drinks that are high in sugar. This includes candy, cookies, regular soda, and sweetened drinks.  Exercise:  Exercise at least 30 minutes per day on most days of the week. Some examples of exercise include walking, biking, dancing, and swimming. You can also fit in more physical activity by taking the stairs instead of the elevator or parking farther away from stores. Ask your healthcare provider about the best exercise plan for you.      © Copyright [employer] 2018 Information is for End User's use only and may not be sold, redistributed or otherwise used for commercial purposes. All illustrations and images included in CareNotes® are the copyrighted property of [employer] or [employer]

## 2024-12-19 NOTE — PROGRESS NOTES
PT Re-Evaluation          POC expires Unit limit Auth Expiration date PT/OT + Visit Limit? Co-Insurance   24   [place] primary, 30pcy secondary Yes                                            Today's date: 2024  Patient name: Asad Galloway  : 1960  MRN: [id number]  Referring provider: Raj Auguste DO  Dx:   No diagnosis found.          Assessment  Assessment details: Patient is a 64 y.o. male who presents to skilled PT for impaired balance, general deconditioning with multiple comorbidiites including hx of CVA, CKD on HD. Patient has been participating in skilled PT tx at this clinic for ~ 2 months. Since his last re-assessment patient has made progress in BURGER, suggesting improvements in static balance. Mild, non-significant regressions noted in TUG cog. Regressions noted in 5 x STS and TUG cog, however this may be due to the fact that patient pushed off of his thighs vs arm rests. Regression also noted in 6 MWT as he was only able to ambulate for 2 minutes. Despite decreased time walked, he was able to ambulate for a longer distance in a shorter amount of time without his cane, which illustrates improvement in CV endurance. Although patient gait speed decreased, he did not require use of cane to this date. He categorizes as a Limited Community Ambulator, per APTA and Rehab Measures Cutoff scores. Gait abnormalities continue to demonstrate M/L instability, decreased propulsion, stance time and swing. Balance confidence decreased by ~9%. Patient behavior has improved significantly since his wife has been present in therapy session. He has not met any goals at this time, however continues to work towards remaining goals. Patient will continue to benefit from skilled PT to address noted impairments and functional limitations they are causing with overall goal to return patient to highest level possible with reduced risk for falls.          Please contact me if you have any questions or recommendations. Thank you for the referral and the opportunity to share in Asad Galloway's care.      Cut off score   All date taken from APTA Neuro Section or Rehab Measures    BURGER test: 46/56                                              5 x STS Test:  MDC: 6 points                                                  MDC: 2.3 seconds   age norms                                                                 Age Norms   60-69 year old = M: 55, F: 55                        60-69 year old: 11.4 seconds   70-79 year old = M 54,  F: 53                       70-79 year old: 12.6 seconds     80-89 year old = M53,   F: 50                       80-89 year old: 14.8 seconds      TUG test:                                                                     10 Meter Walk Test:  MDC: 4.14 seconds       MDC: .59 ft/sec  Cut off score for Falls                                                  Age Norms  > 13.5 seconds community dwelling adults                20-29; M: 4.56 ft/sec F: 4.62 ft/sec  > 32.2 Frail Elderly                                                     30-39: M 4.76 ft/sec  F: 4.68 ft/sec          40-49: M: 4.79 ft/sec  F: 4.62 ft/sec  6 Minute Walk Test      50-59: M: 4.76 ft/sec  F: 4.56 ft/sec  MDC: 190 feet       60-69: M: 4.56 ft/sec  F: 4.26 ft/sec  Age Norms       70-+    M: 4.36 ft/sec  F: 4.16 ft/sec  60-69:    M: 1876 F: 1765  70-79:    M: 1729 F: 1545    FGA:  80-89 +: M: 1368 F; 1286       MCID: 4 points            Geriatrics/ Community Dwelling Older Adults: </= 22/30 fall risk            Geriatrics/ Community Dwelling Older Adults: </= 20/30 unexplained falls in the next 6 months            Parkinsons: </= 18/30 fall risk      Patient verbalized understanding of POC          Impairments: Abnormal coordination, Abnormal gait, Abnormal muscle tone, Abnormal or restricted ROM, Activity intolerance, Impaired balance, Impaired physical strength, Lacks  appropriate HEP, Poor posture, Poor body mechanics, Pain with function, Safety issue, Weight-bearing intolerance, Abnormal movement, Difficulty understanding, Abnormal muscle firing  Understanding of Dx/Px/POC: Excellent  Prognosis: Excellent    Patient verbalized understanding of POC.         Please contact me if you have any questions or recommendations. Thank you for the referral and the opportunity to share in Asad Galloway's care.        Plan  Plan details: complete testing, balance and functional strength/endurance re-training  Patient would benefit from: PT Eval and Skilled PT  Planned modality interventions: Biofeedback, Cryotherapy, TENS, Thermotherapy  Planned therapy interventions: Abdominal trunk stabilization, ADL training, Balance, Balance/WB training, Breathing training, Body mechanics training, Coordination, Functional ROM exercises, Gait training, HEP, Joint Mobilization, Manual Therapy, Griggs taping, Motor coordination training, Neuromuscular re-education, Patient education, Postural training, Strengthening, Stretching, Therapeutic activities, Therapeutic exercises, Therapeutic training, Transfer training, Activity modification, Work reintegration  Frequency: 2x/wk  Duration in weeks: 12  Plan of Care beginning date: 11/7/24  Plan of Care expiration date: 12 weeks - 1/30/25  Treatment plan discussed with: Patient       Goals  Short Term Goals (4 weeks):    - Patient will improve time on TUG by 2.9 seconds to facilitate improved safety in all ambulation MET  - Patient will be independent in basic HEP 2-3 weeks  - Patient will improve 5xSTS score by 2.3 seconds to promote improved LE functional strength needed for ADLs- NM       Long Term Goals (12 weeks):  - Patient will be independent in a comprehensive home exercise program- NM   - Patient will improve scoring on DGI by 2.6 points to progress safety-NA  - Patient will improve gait speed by 0.18 m/s to improve safety with community  ambulation-NM  - Patient will improve BURGER by 6 points in order to improve static balance and reduce risk for falls-NM  - Patient will improve scoring on FGA by 4 points to progress safety with dynamic tasks-NA  - Patient will be able to demonstrate HT in gait without veering-NM  - Patient will improve 6 Minute Walk Test score by 190 feet to promote improved cardiovascular endurance-NM  - Patient will report 50% reduction in near falls in order to improve safety with functional tasks and reduce his risk for falls-ONGOING  - Patient will report going on walks at least 3 days per week to promote independence and improved cardiovascular endurance-NM  - Patient will be able to ascend/descend stairs reciprocally with 1 UE assist to promote independence and safety with ADLs-NM  - Patient will report 50% reduction in near falls when ambulating on uneven terrain-NM       Cut off score    All date taken from APTA Neuro Section or Rehab Measures      Burger/56  MDC: 6 pts  Age Norms:  60-69: M - 55   F - 55  70-79: M - 54   F - 53  80-89: M - 53   F - 50 5xSTS: June et al 2010  MDC: 2.3 sec  Age Norms:  60-69: 11.4 sec  70-79: 12.6 sec  80-89: 14.8 sec   TUG  MDC: 4.14 sec  Cut off score:  >13.5 sec community dwelling adults  >32.2 frail elderly  <20 I for basic transfers  >30 dependent on transfers 10 Meter Walk Test: Zane al 2011  MDC: 0.18 m/s  20-29: M - 1.35 m   F - 1.34 m  30-39: M - 1.43 m   F - 1.34 m  40-49: M - 1.43 m   F - 1.39 m  50-59: M - 1.43 m   F - 1.31 m  60-69: M - 1.34 m   F - 1.24 m  70-79: M - 1.26 m   F - 1.13 m  80-89: M - 0.97 m   F - 0.94 m    Household Ambulator < 0.4 m/s  Limited Community Ambulator 0.4 - 0.8 m/s  Community Ambulator 0.8 - 1.2 m/s  Safely cross the street > 1.2 m/s   FGA  MCID: 4 pts  Geriatrics/community < 22/30 fall risk  Geriatrics/community < 20/30 unexplained falls    DGI  MDC: vestibular - 4 pts  MDC: geriatric/community - 3 pts  Falls risk <19/24  mCTSIB  Norm: 20-60 yrs  Eyes open firm: norm sway 0.21-0.48  Eyes closed firm: norm sway 0.48-0.99  Eyes open foam: norm sway 0.38-0.71  Eyes closed foam: norm sway 0.70-2.22   6 Minute Walk Test  MDC: 190.98 ft  MCID: 164 ft    Age Norms  60-69: M - 1876 ft (571.80 m)  F - 1765 ft (537.98 m)  70-79: M - 1729 ft (527.00 m)  F - 1545 ft (470.92 m)  80-89: M - 1368 ft (416.97 m)  F - 1286 ft (391.97 m) ABC: Smith & Tre, 2003  <67% increased risk for falls   [place] Monofilaments  Evaluator Size:        Force (grams):          Hand/Dorsal Thresholds:        Plantar Thresholds:  - 1.65                       - 0.008                       - Normal                                 - Normal  - 2.36                       - 0.02                         - Normal                                 - Normal  - 2.44                       - 0.04                         - Normal                                 - Normal  - 2.83                       - 0.07                         - Normal                                 - Normal  - 3.22                       - 0.16                         - Diminished light touch          - Normal  - 3.61                       - 0.40                         - Diminished light touch          - Normal  - 3.84                       - 0.60                         - Diminished protective           - Diminished light touch  - 4.08                       - 1.00                         - Diminished protective           - Diminished light touch  - 4.17                       - 1.40                         - Diminished protective           - Diminished light touch  - 4.31                       - 2.00                         - Diminished protective           - Diminished light touch  - 4.56                       - 4.00                         - Loss of protective sense      - Diminished protective  - 4.74                       - 6.00                         - Loss of protective sense      -  Diminished protective  - 4.93                       - 8.00                         - Loss of protective sense      - Diminished protective  - 5.07                       - 10.0                         - Loss of protective sense     - Loss of protective sense  - 5.18                       - 15.0                         - Loss of protective sense     - Loss of protective sense  - 5.46                       - 26.0                         - Loss of protective sense     - Loss of protective sense  - 5.88                       - 60.0                         - Loss of protective sense     - Loss of protective sense  - 6.10                       - 100                          - Loss of protective sense     - Loss of protective sense  - 6.45                       - 180                          - Loss of protective sense     - Loss of protective sense  - 6.65                       - 300                          - Deep pressure sense only  - Deep pressure sense only         Subjective    History of Present Illness  - Mechanism of injury: Patient was being tx at [facility] OP location; transitioned to [place] for neuro/balance PT and neuro OT. He has CKD on HD M, W, Fx 5 years ; recent visit to ED 8/23 fistula bleeding but resolved. He  has a past medical history of Cerebrovascular accident (CVA) due to thrombosis of left middle cerebral artery (HCC) (07/29/2018), Chronic kidney disease, Coronary artery disease, Diabetes mellitus ([facility]), Dialysis patient (HCC), Fistula, GERD (gastroesophageal reflux disease), History of coronary artery stent placement, Hypercholesteremia, Hyperlipidemia, Hypertension, Infectious viral hepatitis, Limb alert care status, Neuropathy, Obesity, Osteomyelitis ([facility]), PVC's (premature ventricular contractions), Stroke ([facility]), TIA (transient ischemic attack) (10/28/2018). He reports impaired balance and increased reliance on family for assist with ADLs; he wants to improve balance and  "independence    Update 10/10/24: Patient reports balance is improving with PT. He feels stronger    Updated 24: Patient reports that therapy has been \"beautiful.\" \"Everything has gotten better.\"       Update 14:  fall on steps 24 was evaluated in ED without acute findings. Pt has bruising to left side of face and L upper extremity,       - Primary AD: cane intermittently   - Assist level at home: family assists him with ADLs  - Decreased fine motor tasks: No    Patient goal:  \"I want everything better \"     Pain  - Current pain ratin/10  - At best pain ratin/10  - At worst pain ratin/10  - Location: low back  - Aggravating factors: unsure     Social Support  - Steps to enter house: 3  - Stairs in house: yes but bedroom on 1st floor    - Lives in: multi story house   - Lives with: wife and 2 kids     - Employment status: retired   - Hand dominance: right    Treatments  - Previous treatment[facility], [facility] clinic  - Current treatment: initiating PT eval/ tx ; also sees neuro OT        Objective     UE SCreen    LE MMT  - R Hip Flexion: 3+/5  L Hip Flexion: 4-/5  - R Hip Abduction: 4-/5  L Hip Abduction: 4-/5  - R Hip Adduction: 4-/5  L Hip Adduction: 4-/5  - R Knee Extension: 4-/5  L Knee Extension: 4-/5  - R Ankle DF: 4/5   L Ankle DF: 4/5  - R Ankle PF: 4-/5   L Ankle PF: 4/5    Sensation  - Light touch: intact       Coordination  - Heel to Shin: impaired B/L  - Alternate Toe Taps: impaired B/L          Postural Screen  - Observation: forward head        Gait  - Abnormalities: ambulates without AD on eval, demos narrow JOSE DAVID, inconsistent step length, lateral instability         Outcome Measures Initial Eval  24 Re-eval  10/10/24 Re eval   24 RE  24     5xSTS 15.85 sec, 2 UE  15.41 sec, 2 UE  21.06 sec, pushing off thighs       TUG  - Regular  - Cognitive   17.3 sec, no AD  24.3 sec, (serial 3s)   12.59 sec, no AD  18.56 sec, no AD (serial 3s)   16. 44 sec, " pushing off thighs  19.74 sec, serial 3s      10 meter   1.18 m/s without AD   0.86 m/s with cane   0.71 m/s wo cane       JENNYFER 30/56 33/56 34/56      mCTSIB  - FTEO (firm)  - FTEC (firm)  - FTEO (foam)  - FTEC (foam)   30 sec +  30 ++  2 sec  0 sec   30 sec+  30 sec+  3 sec  0 sec   30 sec+  30 sec+  3 sec   0 sec      6MWT 100 ft (attempted but only able to complete 1 minute) 275 ft in 4 minutes  212 ft in 2 minutes w no cane       ABC 47.5% 81.88% 72.5%                          Precautions: falls   Past Medical History:   Diagnosis Date    Cerebrovascular accident (CVA) due to thrombosis of left middle cerebral artery (HCC) 07/29/2018    Chronic kidney disease     Coronary artery disease     Diabetes mellitus (HCC)     not on meds    Dialysis patient ([facility])     M-W-F    Fistula     left upper arm for hemodialyis    GERD (gastroesophageal reflux disease)     History of coronary artery stent placement     x3    Hypercholesteremia     Hyperlipidemia     Hypertension     Infectious viral hepatitis     B as child    Limb alert care status     no BP/IV left arm    Neuropathy     Nonhealing ulcer of heel ([facility]) 04/25/2023    Obesity     Osteomyelitis ([facility])     last assessed 11/4/16-per son not currently    Penetrating foot wound, left, initial encounter 01/19/2022    PVC's (premature ventricular contractions)     sees  Cardio    Stroke ([facility])     last weeof July 2018 [facility]    TIA (transient ischemic attack) 10/28/2018    Ulcer of left heel, limited to breakdown of skin ([facility]) 06/13/2024    Wears dentures     Wears glasses

## 2024-12-20 LAB
BACTERIA BLD CULT: NORMAL
BACTERIA BLD CULT: NORMAL

## 2024-12-22 ENCOUNTER — APPOINTMENT (EMERGENCY)
Dept: RADIOLOGY | Facility: HOSPITAL | Age: 64
End: 2024-12-22
Payer: MEDICARE

## 2024-12-22 ENCOUNTER — HOSPITAL ENCOUNTER (OUTPATIENT)
Facility: HOSPITAL | Age: 64
Setting detail: OBSERVATION
Discharge: HOME | End: 2024-12-23
Attending: EMERGENCY MEDICINE | Admitting: INTERNAL MEDICINE
Payer: MEDICARE

## 2024-12-22 DIAGNOSIS — Z99.2 ESRD ON DIALYSIS (HCC): ICD-10-CM

## 2024-12-22 DIAGNOSIS — N18.6 ESRD ON DIALYSIS (HCC): ICD-10-CM

## 2024-12-22 DIAGNOSIS — R94.31 LONG QT INTERVAL: ICD-10-CM

## 2024-12-22 DIAGNOSIS — D64.9 ANEMIA: ICD-10-CM

## 2024-12-22 DIAGNOSIS — R42 LIGHTHEADED: ICD-10-CM

## 2024-12-22 DIAGNOSIS — I50.22 CHRONIC SYSTOLIC HEART FAILURE (HCC): ICD-10-CM

## 2024-12-22 DIAGNOSIS — R07.9 ACUTE CHEST PAIN: Primary | ICD-10-CM

## 2024-12-22 DIAGNOSIS — I44.0 FIRST DEGREE AV BLOCK: ICD-10-CM

## 2024-12-22 DIAGNOSIS — R26.2 AMBULATORY DYSFUNCTION: ICD-10-CM

## 2024-12-22 DIAGNOSIS — E11.9 TYPE 2 DIABETES MELLITUS (HCC): ICD-10-CM

## 2024-12-22 DIAGNOSIS — I10 PRIMARY HYPERTENSION: ICD-10-CM

## 2024-12-22 DIAGNOSIS — I95.9 HYPOTENSION: ICD-10-CM

## 2024-12-22 DIAGNOSIS — R42 DIZZINESS: ICD-10-CM

## 2024-12-22 LAB
2HR DELTA HS TROPONIN: 5 NG/L
ALBUMIN SERPL BCG-MCNC: 4 G/DL (ref 3.5–5)
ALP SERPL-CCNC: 107 U/L (ref 34–104)
ALT SERPL W P-5'-P-CCNC: 5 U/L (ref 7–52)
ANION GAP SERPL CALCULATED.3IONS-SCNC: 13 MMOL/L (ref 4–13)
AST SERPL W P-5'-P-CCNC: 4 U/L (ref 13–39)
ATRIAL RATE: 69 BPM
ATRIAL RATE: 76 BPM
BASOPHILS # BLD AUTO: 0.02 THOUSANDS/ÂΜL (ref 0–0.1)
BASOPHILS NFR BLD AUTO: 0 % (ref 0–1)
BILIRUB SERPL-MCNC: 0.7 MG/DL (ref 0.2–1)
BUN SERPL-MCNC: 36 MG/DL (ref 5–25)
CALCIUM SERPL-MCNC: 7.9 MG/DL (ref 8.4–10.2)
CARDIAC TROPONIN I PNL SERPL HS: 47 NG/L (ref ?–50)
CARDIAC TROPONIN I PNL SERPL HS: 52 NG/L (ref ?–50)
CHLORIDE SERPL-SCNC: 93 MMOL/L (ref 96–108)
CO2 SERPL-SCNC: 31 MMOL/L (ref 21–32)
CREAT SERPL-MCNC: 7.13 MG/DL (ref 0.6–1.3)
EOSINOPHIL # BLD AUTO: 0.11 THOUSAND/ÂΜL (ref 0–0.61)
EOSINOPHIL NFR BLD AUTO: 2 % (ref 0–6)
ERYTHROCYTE [DISTWIDTH] IN BLOOD BY AUTOMATED COUNT: 15.7 % (ref 11.6–15.1)
GFR SERPL CREATININE-BSD FRML MDRD: 7 ML/MIN/1.73SQ M
GLUCOSE SERPL-MCNC: 123 MG/DL (ref 65–140)
HCT VFR BLD AUTO: 32.9 % (ref 36.5–49.3)
HGB BLD-MCNC: 10.4 G/DL (ref 12–17)
IMM GRANULOCYTES # BLD AUTO: 0.02 THOUSAND/UL (ref 0–0.2)
IMM GRANULOCYTES NFR BLD AUTO: 0 % (ref 0–2)
LYMPHOCYTES # BLD AUTO: 0.71 THOUSANDS/ÂΜL (ref 0.6–4.47)
LYMPHOCYTES NFR BLD AUTO: 13 % (ref 14–44)
MCH RBC QN AUTO: 31.5 PG (ref 26.8–34.3)
MCHC RBC AUTO-ENTMCNC: 31.6 G/DL (ref 31.4–37.4)
MCV RBC AUTO: 100 FL (ref 82–98)
MONOCYTES # BLD AUTO: 0.59 THOUSAND/ÂΜL (ref 0.17–1.22)
MONOCYTES NFR BLD AUTO: 11 % (ref 4–12)
NEUTROPHILS # BLD AUTO: 3.96 THOUSANDS/ÂΜL (ref 1.85–7.62)
NEUTS SEG NFR BLD AUTO: 74 % (ref 43–75)
NRBC BLD AUTO-RTO: 0 /100 WBCS
P AXIS: 33 DEGREES
P AXIS: 66 DEGREES
PLATELET # BLD AUTO: 134 THOUSANDS/UL (ref 149–390)
PMV BLD AUTO: 11.1 FL (ref 8.9–12.7)
POTASSIUM SERPL-SCNC: 4.5 MMOL/L (ref 3.5–5.3)
PR INTERVAL: 212 MS
PR INTERVAL: 262 MS
PROT SERPL-MCNC: 7.2 G/DL (ref 6.4–8.4)
QRS AXIS: -62 DEGREES
QRS AXIS: 260 DEGREES
QRSD INTERVAL: 150 MS
QRSD INTERVAL: 164 MS
QT INTERVAL: 492 MS
QT INTERVAL: 494 MS
QTC INTERVAL: 529 MS
QTC INTERVAL: 553 MS
RBC # BLD AUTO: 3.3 MILLION/UL (ref 3.88–5.62)
SODIUM SERPL-SCNC: 137 MMOL/L (ref 135–147)
T WAVE AXIS: -43 DEGREES
T WAVE AXIS: 58 DEGREES
VENTRICULAR RATE: 69 BPM
VENTRICULAR RATE: 76 BPM
WBC # BLD AUTO: 5.41 THOUSAND/UL (ref 4.31–10.16)

## 2024-12-22 PROCEDURE — 85025 COMPLETE CBC W/AUTO DIFF WBC: CPT

## 2024-12-22 PROCEDURE — 36415 COLL VENOUS BLD VENIPUNCTURE: CPT

## 2024-12-22 PROCEDURE — 80053 COMPREHEN METABOLIC PANEL: CPT

## 2024-12-22 PROCEDURE — 84484 ASSAY OF TROPONIN QUANT: CPT

## 2024-12-22 PROCEDURE — 93005 ELECTROCARDIOGRAM TRACING: CPT

## 2024-12-22 PROCEDURE — 93010 ELECTROCARDIOGRAM REPORT: CPT | Performed by: INTERNAL MEDICINE

## 2024-12-22 PROCEDURE — 99285 EMERGENCY DEPT VISIT HI MDM: CPT | Performed by: EMERGENCY MEDICINE

## 2024-12-22 PROCEDURE — 99285 EMERGENCY DEPT VISIT HI MDM: CPT

## 2024-12-22 PROCEDURE — 99222 1ST HOSP IP/OBS MODERATE 55: CPT | Performed by: NURSE PRACTITIONER

## 2024-12-22 PROCEDURE — 71045 X-RAY EXAM CHEST 1 VIEW: CPT

## 2024-12-22 RX ORDER — MUSCLE RUB CREAM 100; 150 MG/G; MG/G
CREAM TOPICAL 4 TIMES DAILY PRN
Status: DISCONTINUED | OUTPATIENT
Start: 2024-12-22 | End: 2024-12-23 | Stop reason: HOSPADM

## 2024-12-22 RX ORDER — MECLIZINE HCL 12.5 MG 12.5 MG/1
12.5 TABLET ORAL ONCE
Status: COMPLETED | OUTPATIENT
Start: 2024-12-22 | End: 2024-12-22

## 2024-12-22 RX ORDER — NITROGLYCERIN 0.4 MG/1
0.4 TABLET SUBLINGUAL
Status: DISCONTINUED | OUTPATIENT
Start: 2024-12-22 | End: 2024-12-23 | Stop reason: HOSPADM

## 2024-12-22 RX ORDER — ASPIRIN 81 MG/1
81 TABLET, CHEWABLE ORAL DAILY
Status: DISCONTINUED | OUTPATIENT
Start: 2024-12-23 | End: 2024-12-22

## 2024-12-22 RX ORDER — ACETAMINOPHEN 325 MG/1
975 TABLET ORAL EVERY 8 HOURS SCHEDULED
Status: DISCONTINUED | OUTPATIENT
Start: 2024-12-22 | End: 2024-12-23 | Stop reason: HOSPADM

## 2024-12-22 RX ORDER — METOPROLOL SUCCINATE 25 MG/1
25 TABLET, EXTENDED RELEASE ORAL 2 TIMES DAILY
Status: DISCONTINUED | OUTPATIENT
Start: 2024-12-22 | End: 2024-12-23 | Stop reason: HOSPADM

## 2024-12-22 RX ORDER — CALCITRIOL 0.25 UG/1
0.5 CAPSULE, LIQUID FILLED ORAL 3 TIMES WEEKLY
Status: DISCONTINUED | OUTPATIENT
Start: 2024-12-23 | End: 2024-12-23 | Stop reason: HOSPADM

## 2024-12-22 RX ORDER — SEVELAMER HYDROCHLORIDE 800 MG/1
2400 TABLET, FILM COATED ORAL
Status: DISCONTINUED | OUTPATIENT
Start: 2024-12-23 | End: 2024-12-23 | Stop reason: HOSPADM

## 2024-12-22 RX ORDER — CINACALCET 30 MG/1
120 TABLET, FILM COATED ORAL 3 TIMES WEEKLY
Status: DISCONTINUED | OUTPATIENT
Start: 2024-12-23 | End: 2024-12-23 | Stop reason: HOSPADM

## 2024-12-22 RX ORDER — MECLIZINE HCL 12.5 MG 12.5 MG/1
12.5 TABLET ORAL EVERY 12 HOURS
Status: DISCONTINUED | OUTPATIENT
Start: 2024-12-23 | End: 2024-12-22

## 2024-12-22 RX ORDER — ATORVASTATIN CALCIUM 40 MG/1
40 TABLET, FILM COATED ORAL
Status: DISCONTINUED | OUTPATIENT
Start: 2024-12-22 | End: 2024-12-23 | Stop reason: HOSPADM

## 2024-12-22 RX ORDER — PRASUGREL 10 MG/1
5 TABLET, FILM COATED ORAL DAILY
Status: DISCONTINUED | OUTPATIENT
Start: 2024-12-23 | End: 2024-12-23 | Stop reason: HOSPADM

## 2024-12-22 RX ORDER — ACETAMINOPHEN 325 MG/1
500 TABLET ORAL ONCE
Status: DISCONTINUED | OUTPATIENT
Start: 2024-12-22 | End: 2024-12-22 | Stop reason: SDUPTHER

## 2024-12-22 RX ORDER — ASPIRIN 81 MG/1
81 TABLET, CHEWABLE ORAL DAILY
COMMUNITY

## 2024-12-22 RX ORDER — HEPARIN SODIUM 5000 [USP'U]/ML
5000 INJECTION, SOLUTION INTRAVENOUS; SUBCUTANEOUS EVERY 8 HOURS SCHEDULED
Status: DISCONTINUED | OUTPATIENT
Start: 2024-12-22 | End: 2024-12-23 | Stop reason: HOSPADM

## 2024-12-22 RX ORDER — MIDODRINE HYDROCHLORIDE 5 MG/1
5 TABLET ORAL
Status: DISCONTINUED | OUTPATIENT
Start: 2024-12-22 | End: 2024-12-23 | Stop reason: HOSPADM

## 2024-12-22 RX ORDER — DIVALPROEX SODIUM 125 MG/1
250 CAPSULE, COATED PELLETS ORAL EVERY 12 HOURS SCHEDULED
Status: DISCONTINUED | OUTPATIENT
Start: 2024-12-22 | End: 2024-12-23 | Stop reason: HOSPADM

## 2024-12-22 RX ADMIN — ATORVASTATIN CALCIUM 40 MG: 40 TABLET, FILM COATED ORAL at 22:45

## 2024-12-22 RX ADMIN — DIVALPROEX SODIUM 250 MG: 125 CAPSULE ORAL at 22:45

## 2024-12-22 RX ADMIN — MECLIZINE HYDROCHLORIDE 12.5 MG: 12.5 TABLET ORAL at 21:34

## 2024-12-22 RX ADMIN — ACETAMINOPHEN 975 MG: 325 TABLET, FILM COATED ORAL at 21:34

## 2024-12-22 RX ADMIN — HEPARIN SODIUM 5000 UNITS: 5000 INJECTION INTRAVENOUS; SUBCUTANEOUS at 21:39

## 2024-12-22 NOTE — ED PROVIDER NOTES
Time reflects when diagnosis was documented in both MDM as applicable and the Disposition within this note       Time User Action Codes Description Comment    12/22/2024  6:40 PM Rayo Bryson Add [R07.9] Acute chest pain     12/22/2024  6:40 PM Rayo Bryson Add [R42] Lightheaded     12/22/2024  7:15 PM Rayo Bryson Add [I44.0] First degree AV block     12/22/2024  7:16 PM Rayo Bryson Add [R94.31] Long QT interval           ED Disposition       None          Assessment & Plan       Medical Decision Making  64-year-old male with past medical history of type 2 diabetes, end-stage renal disease, CAD with stenting and dual antiplatelet therapy, history of DVT in right lower extremity, hypertension, who presents with lightheadedness for 1 week.  He was here in the ED last week for fall with head strike, and has been feeling lightheaded since then.  He describes the lightheadedness as feeling as though he would pass out, and it is made worse with change in positioning from lying to sitting to standing.  Also with chest pain this past week that is substernal, radiating to neck and shoulder, and worse with exertion.    Differential: Postconcussive complications, ACS, orthostatic hypotension, metabolic derangements    Workup: CBC, CMP, troponins, EKG, chest x-ray    Signed out to Marline Watt MD    Amount and/or Complexity of Data Reviewed  Labs: ordered.  Radiology: ordered and independent interpretation performed.    Risk  OTC drugs.             Medications   acetaminophen (TYLENOL) tablet 488 mg (has no administration in time range)       ED Risk Strat Scores      HEART Risk Score      Flowsheet Row Most Recent Value   Heart Score Risk Calculator    History 2 Filed at: 12/22/2024 1820   ECG 2 Filed at: 12/22/2024 1820   Age 1 Filed at: 12/22/2024 1820   Risk Factors 2 Filed at: 12/22/2024 1820   Troponin --   HEART Score --                            SBIRT 20yo+      Flowsheet Row Most  Recent Value   Initial Alcohol Screen: US AUDIT-C     1. How often do you have a drink containing alcohol? 0 Filed at: 12/22/2024 1740   2. How many drinks containing alcohol do you have on a typical day you are drinking?  0 Filed at: 12/22/2024 1740   3a. Male UNDER 65: How often do you have five or more drinks on one occasion? 0 Filed at: 12/22/2024 1740   Audit-C Score 0 Filed at: 12/22/2024 1740   XIN: How many times in the past year have you...    Used an illegal drug or used a prescription medication for non-medical reasons? Never Filed at: 12/22/2024 1740                            History of Present Illness       Chief Complaint   Patient presents with    Dizziness     Patient has dizziness that has progressively gotten worse over the last week after his fall.        Past Medical History:   Diagnosis Date    Cerebrovascular accident (CVA) due to thrombosis of left middle cerebral artery ([facility]) 07/29/2018    Chronic kidney disease     Coronary artery disease     Diabetes mellitus ([facility])     not on meds    Dialysis patient ([facility])     M-W-F    Fistula     left upper arm for hemodialyis    GERD (gastroesophageal reflux disease)     History of coronary artery stent placement     x3    Hypercholesteremia     Hyperlipidemia     Hypertension     Infectious viral hepatitis     B as child    Limb alert care status     no BP/IV left arm    Neuropathy     Nonhealing ulcer of heel ([facility]) 04/25/2023    Obesity     Osteomyelitis ([facility])     last assessed 11/4/16-per son not currently    Penetrating foot wound, left, initial encounter 01/19/2022    PVC's (premature ventricular contractions)     sees  Cardio    Stroke ([facility])     last weeof July 2018 [facility]    TIA (transient ischemic attack) 10/28/2018    Ulcer of left heel, limited to breakdown of skin ([facility]) 06/13/2024    Wears dentures     Wears glasses       Past Surgical History:   Procedure Laterality Date    ABDOMINAL SURGERY      CARDIAC CATHETERIZATION  "N/A 05/02/2022    Procedure: Cardiac Coronary Angiogram;  Surgeon: Sam Davis MD;  [facility];  Service: Cardiology    CARDIAC CATHETERIZATION N/A 05/02/2022    Procedure: Cardiac pci;  Surgeon: Sam Davis MD;  [facility];  Service: Cardiology    CARDIAC CATHETERIZATION  02/01/2023    Procedure: Cardiac catheterization;  Surgeon: Sam Davis MD;  [facility];  Service: Cardiology    CARDIAC CATHETERIZATION N/A 02/01/2023    Procedure: Cardiac pci;  Surgeon: Sam Davis MD;  [facility];  Service: Cardiology    CARDIAC CATHETERIZATION N/A 02/01/2023    Procedure: Cardiac Coronary Angiogram;  Surgeon: Sam Davis MD;  [facility];  Service: Cardiology    CARDIAC CATHETERIZATION N/A 02/01/2023    Procedure: Cardiac other-IVUS;  Surgeon: Sam Davis MD;  [facility];  Service: Cardiology    CHOLECYSTECTOMY      Percutaneous    COLONOSCOPY      CYSTOSCOPY      HEMODIALYSIS ADULT  1/22/2024    HEMODIALYSIS ADULT  1/24/2024    IR LOWER EXTREMITY ANGIOGRAM  9/29/2023    IR LOWER EXTREMITY ANGIOGRAM  2/26/2024    OTHER SURGICAL HISTORY      \"stimulator to control bowel movements\"    ND ESOPHAGOGASTRODUODENOSCOPY TRANSORAL DIAGNOSTIC N/A 09/27/2016    Procedure: ESOPHAGOGASTRODUODENOSCOPY (EGD);  Surgeon: Adele Rowe MD;  Location: [facility] GI [facility];  Service: Gastroenterology    ND LAPAROSCOPY SURG CHOLECYSTECTOMY N/A 02/29/2016    Procedure: LAPAROSCOPIC CHOLECYSTECTOMY ;  Surgeon: Cliff Roman DO;  Location: [facility];  Service: General    ND SLCTV CATHJ 3RD+ ORD SLCTV ABDL PEL/LXTR BRNCH Left 9/29/2023    Procedure: Left leg angiogram with intervention;  Surgeon: Michelle Galvan MD;  Location: [facility];  Service: Vascular    ND SLCTV CATHJ 3RD+ ORD SLCTV ABDL PEL/LXTR BRNCH Left 2/26/2024    Procedure: Left leg angiogram antegrade access, left popliteal angioplasty;  Surgeon: Michelle Galvan MD; " " Location: [facility];  Service: Vascular    ROTATOR CUFF REPAIR Right     SPINAL CORD STIMULATOR IMPLANT      \"Medtronic interstim model # 3058- in lower back to control bowel movements-currently turned off-battery is dead\"    TOE AMPUTATION Right 10/28/2016    Procedure: 3RD TOE AMPUTATION ;  Surgeon: Anjel Salas DPM;  Location: [facility];  Service:       Family History   Problem Relation Age of Onset    Leukemia Mother     Liver disease Mother     Lung cancer Mother         heavy smoker - 3 ppd    Heart disease Father     Liver disease Father     Diabetes type I Father     Multiple myeloma Sister     Breast cancer Sister     Urolithiasis Family     Alcohol abuse Neg Hx     Depression Neg Hx     Drug abuse Neg Hx     Substance Abuse Neg Hx     Mental illness Neg Hx       Social History     Tobacco Use    Smoking status: Never    Smokeless tobacco: Never   Vaping Use    Vaping status: Never Used   Substance Use Topics    Alcohol use: Never    Drug use: No      E-Cigarette/Vaping    E-Cigarette Use Never User       E-Cigarette/Vaping Substances    Nicotine No     THC No     CBD No     Flavoring No     Other No     Unknown No       I have reviewed and agree with the history as documented.     64-year-old male with past medical history of type 2 diabetes, end-stage renal disease, CAD with stents, hypertension, mood disorder on Depakote, history of DVT, on dual antiplatelet therapy who presents with complaints of increased lightheadedness ever since fall and head strike episode for which he was in the ED last week.  He describes a lightheadedness, denies room spinning, denies imbalance, but endorses that he feels as though he might pass out.  This is most often associated with changes in positioning from lying down to sitting up to standing.  He also endorses a chest pain that has been present since his fall last week, substernal pain that is made worse with exertion and does radiate to his neck and shoulder. "  He denies shortness of breath, abdominal pain, diarrhea.  He did have 1 episode of yellow vomitus before coming to the ER.  Otherwise denies hematuria, blood in the stool, other sites of bleeding.  No falls or syncope this week.      Dizziness  Associated symptoms: chest pain and vomiting    Associated symptoms: no diarrhea, no headaches, no nausea, no palpitations and no shortness of breath        Review of Systems   Constitutional:  Negative for chills and fever.   HENT:  Negative for congestion, ear pain, rhinorrhea and sore throat.    Eyes:  Negative for visual disturbance.   Respiratory:  Negative for cough and shortness of breath.    Cardiovascular:  Positive for chest pain. Negative for palpitations.   Gastrointestinal:  Positive for vomiting. Negative for abdominal pain, constipation, diarrhea and nausea.   Genitourinary:  Negative for difficulty urinating and hematuria.   Skin:  Positive for wound. Negative for color change and rash.   Neurological:  Positive for dizziness and light-headedness. Negative for seizures, syncope and headaches.   Psychiatric/Behavioral:  Negative for confusion.    All other systems reviewed and are negative.          Objective       ED Triage Vitals [12/22/24 1742]   Temperature Pulse Blood Pressure Respirations SpO2 Patient Position - Orthostatic VS   98 °F (36.7 °C) 68 107/52 18 100 % Lying      Temp src Heart Rate Source BP Location FiO2 (%) Pain Score    -- Monitor Right arm -- --      Vitals      Date and Time Temp Pulse SpO2 Resp BP Pain Score FACES Pain Rating User   12/22/24 1742 98 °F (36.7 °C) 68 100 % 18 107/52 -- -- ND            Physical Exam  Vitals and nursing note reviewed.   Constitutional:       General: He is not in acute distress.     Appearance: He is well-developed.   HENT:      Head: Normocephalic.      Comments: Hematoma on left superior forehead with ecchymosis down left side of face resulting from fall last week.  Not new     Nose: Nose normal.   Eyes:       Extraocular Movements: Extraocular movements intact.      Conjunctiva/sclera: Conjunctivae normal.      Pupils: Pupils are equal, round, and reactive to light.   Cardiovascular:      Rate and Rhythm: Normal rate and regular rhythm.      Heart sounds: Murmur heard.   Pulmonary:      Effort: Pulmonary effort is normal. No respiratory distress.      Breath sounds: Normal breath sounds.   Abdominal:      Palpations: Abdomen is soft.      Tenderness: There is no abdominal tenderness. There is no guarding.   Musculoskeletal:         General: Signs of injury present. No swelling.      Cervical back: Neck supple.      Right lower leg: Edema present.      Left lower leg: No edema.   Skin:     General: Skin is warm and dry.      Capillary Refill: Capillary refill takes less than 2 seconds.   Neurological:      General: No focal deficit present.      Mental Status: He is alert. Mental status is at baseline.   Psychiatric:         Mood and Affect: Mood normal.         Results Reviewed       Procedure Component Value Units Date/Time    CBC and differential [[id number]]  (Abnormal) Collected: 12/22/24 1856    Lab Status: Final result Specimen: Blood from Arm, Right Updated: 12/22/24 1905     WBC 5.41 Thousand/uL      RBC 3.30 Million/uL      Hemoglobin 10.4 g/dL      Hematocrit 32.9 %       fL      MCH 31.5 pg      MCHC 31.6 g/dL      RDW 15.7 %      MPV 11.1 fL      Platelets 134 Thousands/uL      nRBC 0 /100 WBCs      Segmented % 74 %      Immature Grans % 0 %      Lymphocytes % 13 %      Monocytes % 11 %      Eosinophils Relative 2 %      Basophils Relative 0 %      Absolute Neutrophils 3.96 Thousands/µL      Absolute Immature Grans 0.02 Thousand/uL      Absolute Lymphocytes 0.71 Thousands/µL      Absolute Monocytes 0.59 Thousand/µL      Eosinophils Absolute 0.11 Thousand/µL      Basophils Absolute 0.02 Thousands/µL     HS Troponin 0hr (reflex protocol) [[id number]] Collected: 12/22/24 1856    Lab Status: In process  Specimen: Blood from Arm, Right Updated: 12/22/24 1903    Comprehensive metabolic panel [[id number]] Collected: 12/22/24 1856    Lab Status: In process Specimen: Blood from Arm, Right Updated: 12/22/24 1903            XR chest 1 view portable   ED Interpretation by Rayo Bryson MD (12/22 1839)   Per my independent interpretation: Pulmonary vascular congestion.  No focal consolidation          ECG 12 Lead Documentation Only    Date/Time: 12/22/2024 7:04 PM    Performed by: Gavin Khan DO  Authorized by: Gavin Khan DO    Indications / Diagnosis:  Chest pain  ECG reviewed by me, the ED Provider: yes    Patient location:  ED  Previous ECG:     Previous ECG:  Compared to current    Comparison ECG info:  Sinus rhythm, right bundle branch block    Similarity:  Changes noted    Comparison to cardiac monitor: No    Interpretation:     Interpretation: abnormal    Quality:     Tracing quality:  Limited by artifact  Rate:     ECG rate:  69    ECG rate assessment: normal    Rhythm:     Rhythm: sinus rhythm and A-V block    Ectopy:     Ectopy: none    QRS:     QRS axis:  Left    QRS intervals:  Wide  Conduction:     Conduction: abnormal      Abnormal conduction: complete RBBB and 1st degree    ST segments:     ST segments:  Non-specific    Depression:  V4, V5, V6, V3, aVF and II  T waves:     T waves: non-specific        ED Medication and Procedure Management   Prior to Admission Medications   Prescriptions Last Dose Informant Patient Reported? Taking?   MIDODRINE HCL PO  Child Yes No   Sig: Take 5 mg by mouth PRN w/ dialysis   Sevelamer Carbonate 2.4 g PACK  Child Yes No   Sig: VIGOROUSLY MIX CONTENTS OF 1 PACKET IN WATER (IT WILL NOT DISSOLVE) AND DRINK 3 TIMES DAILY WITH MEALS   Vitamin D3 1.25 MG (34804 UT) capsule  Child No No   Sig: TAKE 1 CAPSULE BY MOUTH ONE TIME PER WEEK   atorvastatin (LIPITOR) 40 mg tablet   No No   Sig: TAKE 1 TABLET BY MOUTH DAILY WITH DINNER   calcitriol (ROCALTROL) 0.5 MCG  capsule  Child No No   Sig: Take 1 capsule (0.5 mcg total) by mouth 3 (three) times a week   cinacalcet (SENSIPAR) 60 MG tablet  Child No No   Sig: Take 2 tablets (120 mg total) by mouth 3 (three) times a week   divalproex sodium (DEPAKOTE SPRINKLE) 125 MG capsule  Child No No   Sig: Take 2 capsules (250 mg total) by mouth every 12 (twelve) hours   metoprolol succinate (TOPROL-XL) 25 mg 24 hr tablet   No No   Sig: TAKE 1 TABLET BY MOUTH TWICE A DAY   prasugrel (EFFIENT) tablet   No No   Sig: TAKE 1 TABLET (5 MG TOTAL) BY MOUTH DAILY.   sacubitril-valsartan (Entresto) 24-26 MG TABS   No No   Sig: Take 1 tablet by mouth in the morning      Facility-Administered Medications: None     Current Discharge Medication List        CONTINUE these medications which have NOT CHANGED    Details   atorvastatin (LIPITOR) 40 mg tablet TAKE 1 TABLET BY MOUTH DAILY WITH DINNER  Qty: 90 tablet, Refills: 1    Associated Diagnoses: Mixed hyperlipidemia      calcitriol (ROCALTROL) 0.5 MCG capsule Take 1 capsule (0.5 mcg total) by mouth 3 (three) times a week  Qty: 12 capsule, Refills: 0    Associated Diagnoses: ESRD (end stage renal disease) on dialysis (HCC)      cinacalcet (SENSIPAR) 60 MG tablet Take 2 tablets (120 mg total) by mouth 3 (three) times a week  Qty: 24 tablet, Refills: 0    Associated Diagnoses: ESRD (end stage renal disease) on dialysis (HCC)      divalproex sodium (DEPAKOTE SPRINKLE) 125 MG capsule Take 2 capsules (250 mg total) by mouth every 12 (twelve) hours  Qty: 360 capsule, Refills: 1    Associated Diagnoses: Mood disorder (HCC)      metoprolol succinate (TOPROL-XL) 25 mg 24 hr tablet TAKE 1 TABLET BY MOUTH TWICE A DAY  Qty: 180 tablet, Refills: 1    Associated Diagnoses: Primary hypertension      MIDODRINE HCL PO Take 5 mg by mouth PRN w/ dialysis      prasugrel (EFFIENT) tablet TAKE 1 TABLET (5 MG TOTAL) BY MOUTH DAILY.  Qty: 90 tablet, Refills: 1    Associated Diagnoses: Ischemic cardiomyopathy; Status post  insertion of drug eluting coronary artery stent      sacubitril-valsartan (Entresto) 24-26 MG TABS Take 1 tablet by mouth in the morning  Qty: 30 tablet, Refills: 5    Associated Diagnoses: Primary hypertension      Sevelamer Carbonate 2.4 g PACK VIGOROUSLY MIX CONTENTS OF 1 PACKET IN WATER (IT WILL NOT DISSOLVE) AND DRINK 3 TIMES DAILY WITH MEALS      Vitamin D3 1.25 MG (25578 UT) capsule TAKE 1 CAPSULE BY MOUTH ONE TIME PER WEEK  Qty: 12 capsule, Refills: 4    Associated Diagnoses: Vitamin D deficiency           No discharge procedures on file.  ED SEPSIS DOCUMENTATION   Time reflects when diagnosis was documented in both MDM as applicable and the Disposition within this note       Time User Action Codes Description Comment    12/22/2024  6:40 PM Rayo Bryson Add [R07.9] Acute chest pain     12/22/2024  6:40 PM Rayo Bryson Add [R42] Lightheaded     12/22/2024  7:15 PM Rayo Bryson Add [I44.0] First degree AV block     12/22/2024  7:16 PM Rayo Bryson Add [R94.31] Long QT interval                  Gavin Khan DO  12/22/24 1918

## 2024-12-22 NOTE — ED ATTENDING ATTESTATION
12/22/2024  I, Rayo Bryson MD, saw and evaluated the patient. I have discussed the patient with the resident/non-physician practitioner and agree with the resident's/non-physician practitioner's findings, Plan of Care, and MDM as documented in the resident's/non-physician practitioner's note, except where noted. All available labs and Radiology studies were reviewed.  I was present for key portions of any procedure(s) performed by the resident/non-physician practitioner and I was immediately available to provide assistance.       At this point I agree with the current assessment done in the Emergency Department.  I have conducted an independent evaluation of this patient a history and physical is as follows:    History    Patient is a 64-year-old male, with a history significant for CAD, ESRD on dialysis (oliguric per patient), prior stroke per my review the medical record, who presents to the ED today for evaluation of a 1 week history of dizziness characterized as lightheadedness.  Patient states he had a mechanical fall on 12/25 and was evaluated in the emergency department for this: CT head at that time was unremarkable.  Patient states he had no dizziness/lightheadedness prior to the fall and it was purely after.  He states it has been constant since the fall.  Furthermore, patient describes intermittent, exertional, sharp in character, radiating to the left shoulder, non pleuritic chest pain.  Patient also describes generalized weakness.  There is no associated fever, vision change, dysphagia, numbness, dysuria, polyuria, abdominal pain.  Patient took 500 mg of Tylenol earlier today to remit his symptoms.  Patient's wife and son, present in room and finding collateral history, states patient is not confused.  Patient states he last had dialysis this past Friday.  Per my review the medical record, he was evaluated by oncology/vascular surgery and Eliquis discontinued in November.     Patient is  without other concerns at this time.     ROS  Patient denies: Fever; dysphagia; vision change; dyspnea; abdominal pain; polyuria; dysuria; rash; numbness; confusion    Physical Exam    GENERAL APPEARANCE: NAD.  Ill-appearing, chronic appearing  NEURO: Cranial nerves 2-12 intact.  5/5 strength in all four extremities including finger extension against resistance.  Sensation to light touch subjectively intact/equal in all four extremities and the face.    Patient is speaking clearly in complete sentences.  Patient is answering appropriately and able to follow commands.   HEENT: PERRL, Moist mucous membranes, external ears normal, nose normal.  Swelling/bruising of the face.  Neck: No cervical adenopathy  CV: RRR. No murmurs, rubs, gallops  LUNGS: Clear to auscultation: No wheezes, stridor, rhonchi, rales  GI: Abdomen non-distended. Soft. Non-tender and without rebound or guarding   : Deferred at this time  MSK: No deformity.  Slight bilateral lower extremity pitting edema.  No pain with calf squeeze.  No tenderness to palpation of the chest wall or left shoulder.  Skin: Warm and dry  Capillary refill: <2 seconds    Patient is currently afebrile and hemodynamically stable.    Assessment/Plan/MDM  Chest pain, lightheadedness  -This presentation is concerning for: ACS, electrolyte abnormality, anemia, concussion/postconcussive syndrome, pneumothorax.  Patient at risk for pneumonia.  Doubt intracranial hemorrhage, fracture based on history physical exam, recent imaging/evaluation for follow-up (patient denies subsequent falls after the 1 on the 15th)  -Will investigate with cardiac workup  -Will manage with Tylenol and further based upon workup    ED Course  ED Course as of 12/22/24 1919   Sun Dec 22, 2024   1827 ECG per my independent interpretation: Normal rate, regular rhythm, no ectopy, leftward axis, no ST elevations.  Precordial depressions present   1905 Hemoglobin(!): 10.4  Low, similar to prior    1919 Patient  signed out prior to final disposition         Critical Care Time  Procedures

## 2024-12-23 ENCOUNTER — APPOINTMENT (OUTPATIENT)
Dept: DIALYSIS | Facility: HOSPITAL | Age: 64
End: 2024-12-23
Payer: MEDICARE

## 2024-12-23 ENCOUNTER — APPOINTMENT (OUTPATIENT)
Facility: CLINIC | Age: 64
End: 2024-12-23
Payer: MEDICARE

## 2024-12-23 VITALS
DIASTOLIC BLOOD PRESSURE: 56 MMHG | SYSTOLIC BLOOD PRESSURE: 113 MMHG | OXYGEN SATURATION: 95 % | TEMPERATURE: 97.8 F | RESPIRATION RATE: 18 BRPM | HEART RATE: 80 BPM

## 2024-12-23 LAB
4HR DELTA HS TROPONIN: 3 NG/L
ANION GAP SERPL CALCULATED.3IONS-SCNC: 11 MMOL/L (ref 4–13)
ATRIAL RATE: 70 BPM
BUN SERPL-MCNC: 40 MG/DL (ref 5–25)
CALCIUM SERPL-MCNC: 7.6 MG/DL (ref 8.4–10.2)
CARDIAC TROPONIN I PNL SERPL HS: 50 NG/L (ref ?–50)
CHLORIDE SERPL-SCNC: 93 MMOL/L (ref 96–108)
CO2 SERPL-SCNC: 30 MMOL/L (ref 21–32)
CREAT SERPL-MCNC: 7.59 MG/DL (ref 0.6–1.3)
ERYTHROCYTE [DISTWIDTH] IN BLOOD BY AUTOMATED COUNT: 15.7 % (ref 11.6–15.1)
GFR SERPL CREATININE-BSD FRML MDRD: 6 ML/MIN/1.73SQ M
GLUCOSE SERPL-MCNC: 103 MG/DL (ref 65–140)
GLUCOSE SERPL-MCNC: 121 MG/DL (ref 65–140)
HCT VFR BLD AUTO: 30.1 % (ref 36.5–49.3)
HGB BLD-MCNC: 9.6 G/DL (ref 12–17)
MCH RBC QN AUTO: 31.5 PG (ref 26.8–34.3)
MCHC RBC AUTO-ENTMCNC: 31.9 G/DL (ref 31.4–37.4)
MCV RBC AUTO: 99 FL (ref 82–98)
P AXIS: 50 DEGREES
PLATELET # BLD AUTO: 138 THOUSANDS/UL (ref 149–390)
PMV BLD AUTO: 11.6 FL (ref 8.9–12.7)
POTASSIUM SERPL-SCNC: 4.9 MMOL/L (ref 3.5–5.3)
PR INTERVAL: 202 MS
QRS AXIS: 244 DEGREES
QRSD INTERVAL: 152 MS
QT INTERVAL: 494 MS
QTC INTERVAL: 533 MS
RBC # BLD AUTO: 3.05 MILLION/UL (ref 3.88–5.62)
SODIUM SERPL-SCNC: 134 MMOL/L (ref 135–147)
T WAVE AXIS: -38 DEGREES
VENTRICULAR RATE: 70 BPM
WBC # BLD AUTO: 5.47 THOUSAND/UL (ref 4.31–10.16)

## 2024-12-23 PROCEDURE — 84484 ASSAY OF TROPONIN QUANT: CPT

## 2024-12-23 PROCEDURE — 80048 BASIC METABOLIC PNL TOTAL CA: CPT | Performed by: NURSE PRACTITIONER

## 2024-12-23 PROCEDURE — 99215 OFFICE O/P EST HI 40 MIN: CPT | Performed by: INTERNAL MEDICINE

## 2024-12-23 PROCEDURE — 85027 COMPLETE CBC AUTOMATED: CPT | Performed by: NURSE PRACTITIONER

## 2024-12-23 PROCEDURE — 93005 ELECTROCARDIOGRAM TRACING: CPT

## 2024-12-23 PROCEDURE — 97163 PT EVAL HIGH COMPLEX 45 MIN: CPT

## 2024-12-23 PROCEDURE — 99239 HOSP IP/OBS DSCHRG MGMT >30: CPT | Performed by: INTERNAL MEDICINE

## 2024-12-23 PROCEDURE — 36415 COLL VENOUS BLD VENIPUNCTURE: CPT

## 2024-12-23 PROCEDURE — 93010 ELECTROCARDIOGRAM REPORT: CPT | Performed by: INTERNAL MEDICINE

## 2024-12-23 PROCEDURE — 82948 REAGENT STRIP/BLOOD GLUCOSE: CPT

## 2024-12-23 PROCEDURE — 97167 OT EVAL HIGH COMPLEX 60 MIN: CPT

## 2024-12-23 PROCEDURE — G0257 UNSCHED DIALYSIS ESRD PT HOS: HCPCS

## 2024-12-23 RX ORDER — ASPIRIN 81 MG/1
81 TABLET, CHEWABLE ORAL DAILY
Status: DISCONTINUED | OUTPATIENT
Start: 2024-12-23 | End: 2024-12-23 | Stop reason: HOSPADM

## 2024-12-23 RX ORDER — METOPROLOL SUCCINATE 25 MG/1
25 TABLET, EXTENDED RELEASE ORAL DAILY
Start: 2024-12-23

## 2024-12-23 RX ORDER — ALBUMIN (HUMAN) 12.5 G/50ML
25 SOLUTION INTRAVENOUS ONCE AS NEEDED
Status: COMPLETED | OUTPATIENT
Start: 2024-12-23 | End: 2024-12-23

## 2024-12-23 RX ORDER — ALBUMIN (HUMAN) 12.5 G/50ML
25 SOLUTION INTRAVENOUS ONCE
Status: DISCONTINUED | OUTPATIENT
Start: 2024-12-23 | End: 2024-12-23 | Stop reason: HOSPADM

## 2024-12-23 RX ADMIN — HEPARIN SODIUM 5000 UNITS: 5000 INJECTION INTRAVENOUS; SUBCUTANEOUS at 05:23

## 2024-12-23 RX ADMIN — HEPARIN SODIUM 5000 UNITS: 5000 INJECTION INTRAVENOUS; SUBCUTANEOUS at 13:21

## 2024-12-23 RX ADMIN — CALCITRIOL CAPSULES 0.25 MCG 0.5 MCG: 0.25 CAPSULE ORAL at 09:59

## 2024-12-23 RX ADMIN — SEVELAMER HYDROCHLORIDE 2400 MG: 800 TABLET ORAL at 13:00

## 2024-12-23 RX ADMIN — MIDODRINE HYDROCHLORIDE 5 MG: 5 TABLET ORAL at 15:52

## 2024-12-23 RX ADMIN — SACUBITRIL AND VALSARTAN 1 TABLET: 24; 26 TABLET, FILM COATED ORAL at 09:59

## 2024-12-23 RX ADMIN — ASPIRIN 81 MG 81 MG: 81 TABLET ORAL at 09:58

## 2024-12-23 RX ADMIN — ALBUMIN (HUMAN) 25 G: 0.25 INJECTION, SOLUTION INTRAVENOUS at 15:57

## 2024-12-23 RX ADMIN — PRASUGREL 5 MG: 10 TABLET, FILM COATED ORAL at 09:58

## 2024-12-23 RX ADMIN — DIVALPROEX SODIUM 250 MG: 125 CAPSULE ORAL at 10:00

## 2024-12-23 RX ADMIN — SEVELAMER HYDROCHLORIDE 2400 MG: 800 TABLET ORAL at 09:58

## 2024-12-23 RX ADMIN — ATORVASTATIN CALCIUM 40 MG: 40 TABLET, FILM COATED ORAL at 15:52

## 2024-12-23 RX ADMIN — CINACALCET 120 MG: 30 TABLET ORAL at 10:00

## 2024-12-23 NOTE — ASSESSMENT & PLAN NOTE
Patient presented with dizziness x1 week (since fall on 12/15)   Has known episodes of dizziness, is prescribed meclizine. However, patient reports hypotension with this  Continue to hold meclizine in the setting of hypotension   Monitor BP  Management per primary team

## 2024-12-23 NOTE — ASSESSMENT & PLAN NOTE
Lab Results   Component Value Date    HGBA1C 5.5 11/07/2024     Diet controlled  Monitor glucose with a.m. labs

## 2024-12-23 NOTE — H&P
H&P - Hospitalist   Name: Asad Galloway 64 y.o. male I MRN: [id number]  Unit/Bed#: ED-22 I Date of Admission: 12/22/2024   Date of Service: 12/22/2024 I Hospital Day: 0     Assessment & Plan  Dizziness  Patient has known episodes of dizziness for which he is prescribed meclizine.  Ongoing dizziness for at least the past week.  Given meclizine x 1 dose while pt wife at bedside however then talking with son he reports low blood pressures with this.  Hold further doses and monitor bp.  Orthostatic bp   Neuro checks   PT OT  Fall precautions  Chest pain  Chest pain with radiation to left shoulder.  Suspected msk as pain occurred following fall.  Worsens with movement.  Hx multivesel CAD with stent to LAD (2022).  Stent restenosis to LAD treated with ADE.  Unable to stent OM branch.  Troponin 47  EKG sinus, 1st degree AV block, left axis deviation, RBBB, T wave abnormality.  Echo 8/2024 EF 50%, severe diastolic dysfunction  Trend troponin, serial EKG  Telemetry   Pain control: Bengay, Tylenol  History of stroke in adulthood  No residual deficits  Continue aspirin, Effient, statin  ESRD on dialysis (HCC)  HD Monday Wednesday Friday via left AV fistula  Consult nephrology  Chronic systolic heart failure (HCC)  No weight documented on admission but appears stable  Volume management with HD  Continue home meds  Entresto, Toprol  Type 2 diabetes mellitus (HCC)  Lab Results   Component Value Date    HGBA1C 5.5 11/07/2024     Diet controlled  Monitor glucose with a.m. labs  Emotional lability  Maintained on Depakote  Ambulatory dysfunction  Ambulates with cane at baseline.  Last fall 12/15.  Fall precaution  PT OT      VTE Pharmacologic Prophylaxis: VTE Score: 5 High Risk (Score >/= 5) - Pharmacological DVT Prophylaxis Ordered: heparin. Sequential Compression Devices Ordered.  Code Status: Level 1 - Full Code   Discussion with family: Updated  (wife) at bedside.    Anticipated Length of Stay: Patient will be  admitted on an observation basis with an anticipated length of stay of less than 2 midnights secondary to dizziness.    History of Present Illness   Chief Complaint: Dizziness, chest pain    Asad Galloway is a 64 y.o. male with a PMH of ESRD on HD, CAD with prior stent, diet-controlled diabetes CVA with no residual deficit who presents with chest pain and dizziness.  Chest pain has been present since 1215 following a mechanical fall.  He has significant bruising, hematoma to the left side of his frontal scalp.  He has significant bruising to his buttocks.  He has had no new neurological symptoms since the fall.  He denies any vision changes, new weakness, speech changes, or gait abnormality.  He has had no nausea or vomiting.  Dizziness has been present for about 1 week.  He has not tried anything at home.  Chart review shows ongoing bouts of dizziness at times for which she has taken meclizine in the past.  Will trial dose of meclizine now, obtain orthostatic BP, consult PT OT.    Review of Systems   Eyes:  Negative for photophobia, pain, discharge, redness, itching and visual disturbance.   Respiratory:  Positive for cough (chronic).    Cardiovascular:  Positive for chest pain.   Neurological:  Positive for dizziness and light-headedness. Negative for tremors, seizures, syncope, facial asymmetry, speech difficulty, weakness, numbness and headaches.   Hematological:  Bruises/bleeds easily.   All other systems reviewed and are negative.      Historical Information   Past Medical History:   Diagnosis Date    Cerebrovascular accident (CVA) due to thrombosis of left middle cerebral artery (HCC) 07/29/2018    Chronic kidney disease     Coronary artery disease     Diabetes mellitus (HCC)     not on meds    Dialysis patient ([facility])     M-W-F    Fistula     left upper arm for hemodialyis    GERD (gastroesophageal reflux disease)     History of coronary artery stent placement     x3    Hypercholesteremia     Hyperlipidemia   "   Hypertension     Infectious viral hepatitis     B as child    Limb alert care status     no BP/IV left arm    Neuropathy     Nonhealing ulcer of heel (HCC) 04/25/2023    Obesity     Osteomyelitis (HCC)     last assessed 11/4/16-per son not currently    Penetrating foot wound, left, initial encounter 01/19/2022    PVC's (premature ventricular contractions)     sees  Cardio    Stroke (HCC)     last weeof July 2018 [facility]    TIA (transient ischemic attack) 10/28/2018    Ulcer of left heel, limited to breakdown of skin (HCC) 06/13/2024    Wears dentures     Wears glasses      Past Surgical History:   Procedure Laterality Date    ABDOMINAL SURGERY      CARDIAC CATHETERIZATION N/A 05/02/2022    Procedure: Cardiac Coronary Angiogram;  Surgeon: Sam Davis MD;  [facility];  Service: Cardiology    CARDIAC CATHETERIZATION N/A 05/02/2022    Procedure: Cardiac pci;  Surgeon: Sam Davis MD;  [facility];  Service: Cardiology    CARDIAC CATHETERIZATION  02/01/2023    Procedure: Cardiac catheterization;  Surgeon: Sam Davis MD;  [facility];  Service: Cardiology    CARDIAC CATHETERIZATION N/A 02/01/2023    Procedure: Cardiac pci;  Surgeon: Sam Davis MD;  [facility];  Service: Cardiology    CARDIAC CATHETERIZATION N/A 02/01/2023    Procedure: Cardiac Coronary Angiogram;  Surgeon: Sam Davis MD;  [facility];  Service: Cardiology    CARDIAC CATHETERIZATION N/A 02/01/2023    Procedure: Cardiac other-IVUS;  Surgeon: Sam Davis MD;  [facility];  Service: Cardiology    CHOLECYSTECTOMY      Percutaneous    COLONOSCOPY      CYSTOSCOPY      HEMODIALYSIS ADULT  1/22/2024    HEMODIALYSIS ADULT  1/24/2024    IR LOWER EXTREMITY ANGIOGRAM  9/29/2023    IR LOWER EXTREMITY ANGIOGRAM  2/26/2024    OTHER SURGICAL HISTORY      \"stimulator to control bowel movements\"    TN ESOPHAGOGASTRODUODENOSCOPY " "TRANSORAL DIAGNOSTIC N/A 09/27/2016    Procedure: ESOPHAGOGASTRODUODENOSCOPY (EGD);  Surgeon: Adele Rowe MD;  Location: [facility] GI LAB;  Service: Gastroenterology    CT LAPAROSCOPY SURG CHOLECYSTECTOMY N/A 02/29/2016    Procedure: LAPAROSCOPIC CHOLECYSTECTOMY ;  Surgeon: Cliff Roman DO;  Location: [facility] Main OR;  Service: General    CT SLCTV CATHJ 3RD+ ORD SLCTV ABDL PEL/LXTR BRNCH Left 9/29/2023    Procedure: Left leg angiogram with intervention;  Surgeon: Michelle Galvan MD;  Location: [facility] MA[facility];  Service: Vascular    CT SLCTV CATHJ 3RD+ ORD SLCTV ABDL PEL/LXTR BRNCH Left 2/26/2024    Procedure: Left leg angiogram antegrade access, left popliteal angioplasty;  Surgeon: Michelle Galvan MD;  Location: [facility];  Service: Vascular    ROTATOR CUFF REPAIR Right     SPINAL CORD STIMULATOR IMPLANT      \"Medtronic interstim model # 3058- in lower back to control bowel movements-currently turned off-battery is dead\"    TOE AMPUTATION Right 10/28/2016    Procedure: 3RD TOE AMPUTATION ;  Surgeon: Anjel Salas DPM;  Location: [facility];  Service:      Social History     Tobacco Use    Smoking status: Never    Smokeless tobacco: Never   Vaping Use    Vaping status: Never Used   Substance and Sexual Activity    Alcohol use: Never    Drug use: No    Sexual activity: Not Currently     Partners: Female     Comment: defer     E-Cigarette/Vaping    E-Cigarette Use Never User      E-Cigarette/Vaping Substances    Nicotine No     THC No     CBD No     Flavoring No     Other No     Unknown No      Family History   Problem Relation Age of Onset    Leukemia Mother     Liver disease Mother     Lung cancer Mother         heavy smoker - 3 ppd    Heart disease Father     Liver disease Father     Diabetes type I Father     Multiple myeloma Sister     Breast cancer Sister     Urolithiasis Family     Alcohol abuse Neg Hx     Depression Neg Hx     Drug abuse Neg Hx     Substance Abuse Neg Hx     Mental illness Neg Hx  "     Social History:  Marital Status: /Civil Union   Occupation:   Patient Pre-hospital Living Situation: Home  Patient Pre-hospital Level of Mobility: walks cane  Patient Pre-hospital Diet Restrictions: pills crushed    Meds/Allergies   I have reviewed home medications with a medical source (PCP, Pharmacy, other).  Prior to Admission medications    Medication Sig Start Date End Date Taking? Authorizing Provider   atorvastatin (LIPITOR) 40 mg tablet TAKE 1 TABLET BY MOUTH DAILY WITH DINNER 9/9/24   Raj Auguste DO   calcitriol (ROCALTROL) 0.5 MCG capsule Take 1 capsule (0.5 mcg total) by mouth 3 (three) times a week 7/17/24   Christine Silva MD   cinacalcet (SENSIPAR) 60 MG tablet Take 2 tablets (120 mg total) by mouth 3 (three) times a week 7/17/24   Christine Silva MD   divalproex sodium (DEPAKOTE SPRINKLE) 125 MG capsule Take 2 capsules (250 mg total) by mouth every 12 (twelve) hours 7/23/24   Raj Auguste DO   metoprolol succinate (TOPROL-XL) 25 mg 24 hr tablet TAKE 1 TABLET BY MOUTH TWICE A DAY 10/7/24   Britney Tan MD   MIDODRINE HCL PO Take 5 mg by mouth PRN w/ dialysis 8/9/24 8/8/25  Historical Provider, MD   prasugrel (EFFIENT) tablet TAKE 1 TABLET (5 MG TOTAL) BY MOUTH DAILY. 11/12/24   Britney Tan MD   sacubitril-valsartan (Entresto) 24-26 MG TABS Take 1 tablet by mouth in the morning 10/17/24   Britney Tan MD   Sevelamer Carbonate 2.4 g PACK VIGOROUSLY MIX CONTENTS OF 1 PACKET IN WATER (IT WILL NOT DISSOLVE) AND DRINK 3 TIMES DAILY WITH MEALS 4/21/23   Historical Provider, MD   Vitamin D3 1.25 MG (76957 UT) capsule TAKE 1 CAPSULE BY MOUTH ONE TIME PER WEEK 4/17/24   Mary Torres MD     No Known Allergies    Objective :  Temp:  [98 °F (36.7 °C)] 98 °F (36.7 °C)  HR:  [68] 68  BP: (107)/(52) 107/52  Resp:  [18] 18  SpO2:  [100 %] 100 %  O2 Device: None (Room air)    Physical Exam  Constitutional:       General: He is not in acute distress.      Appearance: Normal appearance. He is ill-appearing (chronic). He is not toxic-appearing.   HENT:      Head:      Comments:   Left frontal bruising  Left frontal hematoma     Right Ear: External ear normal.      Left Ear: External ear normal.      Nose: Nose normal.      Mouth/Throat:      Pharynx: Oropharynx is clear.   Eyes:      Extraocular Movements: Extraocular movements intact.      Conjunctiva/sclera: Conjunctivae normal.      Pupils: Pupils are equal, round, and reactive to light.   Cardiovascular:      Rate and Rhythm: Normal rate and regular rhythm.      Pulses: Normal pulses.      Heart sounds: Normal heart sounds.   Pulmonary:      Effort: Pulmonary effort is normal.      Breath sounds: Normal breath sounds.   Abdominal:      General: Bowel sounds are normal.      Palpations: Abdomen is soft.   Musculoskeletal:         General: Normal range of motion.      Cervical back: Normal range of motion.   Skin:     General: Skin is warm and dry.      Capillary Refill: Capillary refill takes less than 2 seconds.      Findings: Bruising (buttocks) present.   Neurological:      General: No focal deficit present.      Mental Status: He is alert and oriented to person, place, and time.   Psychiatric:         Mood and Affect: Mood normal.         Behavior: Behavior normal.          Lines/Drains:            Lab Results: I have reviewed the following results:  Results from last 7 days   Lab Units 12/22/24  1856   WBC Thousand/uL 5.41   HEMOGLOBIN g/dL 10.4*   HEMATOCRIT % 32.9*   PLATELETS Thousands/uL 134*   SEGS PCT % 74   LYMPHO PCT % 13*   MONO PCT % 11   EOS PCT % 2     Results from last 7 days   Lab Units 12/22/24  1856   SODIUM mmol/L 137   POTASSIUM mmol/L 4.5   CHLORIDE mmol/L 93*   CO2 mmol/L 31   BUN mg/dL 36*   CREATININE mg/dL 7.13*   ANION GAP mmol/L 13   CALCIUM mg/dL 7.9*   ALBUMIN g/dL 4.0   TOTAL BILIRUBIN mg/dL 0.70   ALK PHOS U/L 107*   ALT U/L 5*   AST U/L 4*   GLUCOSE RANDOM mg/dL 123             Lab  Results   Component Value Date    HGBA1C 5.5 11/07/2024    HGBA1C 5.9 (H) 07/14/2024    HGBA1C 5.1 01/10/2024           Imaging Results Review: No pertinent imaging studies reviewed.  Other Study Results Review: EKG was reviewed.  EKG was personally reviewed and my interpretation is: Personally Reviewed. NSR. HR 69..    Administrative Statements   I have spent a total time of   minutes in caring for this patient on the day of the visit/encounter including Diagnostic results, Prognosis, Risks and benefits of tx options, Instructions for management, Patient and family education, Importance of tx compliance, Risk factor reductions, Impressions, Counseling / Coordination of care, Documenting in the medical record, Reviewing / ordering tests, medicine, procedures  , Obtaining or reviewing history  , and Communicating with other healthcare professionals .    ** Please Note: This note has been constructed using a voice recognition system. **

## 2024-12-23 NOTE — ED NOTES
RN to the bedside and found that patient had climbed out of the end of the stretcher with both side rails up and is sitting in chair, patient and his wife at the bedside were re-educated about fall risk and interventions in place, reminded to ring for assistance if he would like to get out of bed, both verbalized understanding. Patient currently sitting in chair with call bell and personal belongings in reach and indicated that he will ring for staff when he is ready to get back into bed.      Beatriz Kunz RN  12/23/24 1278       Beatriz Kunz RN  12/23/24 1625     home

## 2024-12-23 NOTE — HEMODIALYSIS
Post-Dialysis RN Treatment Note    Blood Pressure:  Pre 101/61 mm/Hg  Post 113/65 mmHg   EDW:  94 kg    Weight:  Pre 96.2 kg   Post 95.8 kg   Mode of weight measurement: Standing Scale   Volume Removed:  386 ml    Treatment duration: 180 minutes    NS given:  No    Treatment shortened Yes, describe: staffing   Medications given during Rx: Midodrine 5 mg, Albumin 25g iv   Estimated Kt/V:  Not Applicable   Access type: AV fistula   Needle Gauge:  15   Access Issues: No    Report called to primary nurse:   Yes Christiano Hein RN    Hypotensive during tx, midodrine and albumin given for bp support, Dr. Duran aware, machine temp also lowered to 35.5.

## 2024-12-23 NOTE — ASSESSMENT & PLAN NOTE
Lab Results   Component Value Date    EGFR 6 12/23/2024    EGFR 7 12/22/2024    EGFR 7 12/15/2024    CREATININE 7.59 (H) 12/23/2024    CREATININE 7.13 (H) 12/22/2024    CREATININE 6.73 (H) 12/15/2024   MWF at [facility]- last HD 12/20     Schedule this week: Mon, Tues, Fri due to holiday  Access: Left AV fistula   EDW: 94kg  2 recent increases in EDW due to patient not tolerating a higher UF secondary to hypotension   CXR 12/22 with vascular congestion  Plan for HD today and tomorrow

## 2024-12-23 NOTE — ASSESSMENT & PLAN NOTE
History of CVA, does not appear to be on any BAYLEE per chart review  Continue to monitor at this time

## 2024-12-23 NOTE — ASSESSMENT & PLAN NOTE
Patient has known episodes of dizziness for which he is prescribed meclizine.  Ongoing dizziness for at least the past week.  Given meclizine x 1 dose while pt wife at bedside however then talking with son he reports low blood pressures with this.  Hold further doses and monitor bp.  Orthostatic bp   Neuro checks   PT OT  Fall precautions

## 2024-12-23 NOTE — ASSESSMENT & PLAN NOTE
Patient has known episodes of dizziness for which he is prescribed meclizine.  Ongoing dizziness for at least the past week.  Likely due to to low blood pressure  Patient takes Entresto daily, takes metoprolol succinate 25 mg at bedtime on dialysis days and twice a day rest of the days  Recommend to decrease metoprolol succinate to 25 mg daily  Explained to the patient and family.  Agree with the plan.  Patient will follow-up with cardiology outpatient

## 2024-12-23 NOTE — ED NOTES
"Patient's wife questioning if patient was given meloxicam or plavix, advised neither medication was given and asked if the patient normally takes these, patient's wife stated \"he does not take them, and you cannot give them to him, that is why his blood pressure is low because you gave them to him\". Wife educated that neither medication was given and that neither medication is ordered for the patient at this time, so that they will not be given. Wife also reminded that each medication the patient was given this morning was reviewed with her and the patient at the time of administration and that she verbalized that all were all correct at that time.      Beatriz Kunz RN  12/23/24 [id number]    "

## 2024-12-23 NOTE — ASSESSMENT & PLAN NOTE
Ambulates with cane at baseline.  Last fall 12/15.  Fall precaution  Rehab recommended.  Family declined however.  Will be discharged home with [facility]

## 2024-12-23 NOTE — ED CARE HANDOFF
Emergency Department Sign Out Note        Sign out and transfer of care from Dr. Florencio Khan. See Separate Emergency Department note.     The patient, Asad Galloway, was evaluated by the previous provider for Dizziness and chest pain    Workup Completed:  CBC, CMP, EKG, CXR   EKG with new 1st degree block    ED Course / Workup Pending (followup):    Troponin pending.     HEART score of 7 without troponin  Will need to be admitted regardless of troponin       HEART Risk Score      Flowsheet Row Most Recent Value   Heart Score Risk Calculator    History 2 Filed at: 12/22/2024 1957   ECG 2 Filed at: 12/22/2024 1957   Age 1 Filed at: 12/22/2024 1957   Risk Factors 2 Filed at: 12/22/2024 1957   Troponin 2 Filed at: 12/22/2024 1957   HEART Score 9 Filed at: 12/22/2024 1957                ED Course as of 12/22/24 2058   Sun Dec 22, 2024   1956 HS Troponin 0hr (reflex protocol)     Procedures  Medical Decision Making  Amount and/or Complexity of Data Reviewed  Labs: ordered. Decision-making details documented in ED Course.  Radiology: ordered and independent interpretation performed.    Risk  OTC drugs.  Decision regarding hospitalization.      Patient is a 64-year-old male, with a history significant for CAD, ESRD on dialysis (oliguric per patient), prior stroke per my review the medical record, who presents to the ED today for evaluation of a 1 week history of dizziness characterized as lightheadedness.   See Dr. Gavin Khan's primary note for additional details.    Patient troponin of 47. HEART score of 9. Admitted to [facility] for further management.     Plan explained to patient and family.  Patient and family expressed understanding and agreed with plan.    Disposition  Final diagnoses:   Acute chest pain   Lightheaded   First degree AV block   Long QT interval     Time reflects when diagnosis was documented in both MDM as applicable and the Disposition within this note       Time User Action Codes Description  Comment    12/22/2024  6:40 PM Rayo Bryson Add [R07.9] Acute chest pain     12/22/2024  6:40 PM Rayo Bryson Add [R42] Lightheaded     12/22/2024  7:15 PM Rayo Bryson Add [I44.0] First degree AV block     12/22/2024  7:16 PM Rayo Bryson Add [R94.31] Long QT interval           ED Disposition       ED Disposition   Admit    Condition   Stable    Date/Time   Sun Dec 22, 2024 2044    Comment   Case was discussed with MICKEY and the patient's admission status was agreed to be Admission Status: observation status to the service of Dr. Mccauley .               Follow-up Information    None       Current Discharge Medication List        CONTINUE these medications which have NOT CHANGED    Details   atorvastatin (LIPITOR) 40 mg tablet TAKE 1 TABLET BY MOUTH DAILY WITH DINNER  Qty: 90 tablet, Refills: 1    Associated Diagnoses: Mixed hyperlipidemia      calcitriol (ROCALTROL) 0.5 MCG capsule Take 1 capsule (0.5 mcg total) by mouth 3 (three) times a week  Qty: 12 capsule, Refills: 0    Associated Diagnoses: ESRD (end stage renal disease) on dialysis (HCC)      cinacalcet (SENSIPAR) 60 MG tablet Take 2 tablets (120 mg total) by mouth 3 (three) times a week  Qty: 24 tablet, Refills: 0    Associated Diagnoses: ESRD (end stage renal disease) on dialysis (HCC)      divalproex sodium (DEPAKOTE SPRINKLE) 125 MG capsule Take 2 capsules (250 mg total) by mouth every 12 (twelve) hours  Qty: 360 capsule, Refills: 1    Associated Diagnoses: Mood disorder (HCC)      metoprolol succinate (TOPROL-XL) 25 mg 24 hr tablet TAKE 1 TABLET BY MOUTH TWICE A DAY  Qty: 180 tablet, Refills: 1    Associated Diagnoses: Primary hypertension      MIDODRINE HCL PO Take 5 mg by mouth PRN w/ dialysis      prasugrel (EFFIENT) tablet TAKE 1 TABLET (5 MG TOTAL) BY MOUTH DAILY.  Qty: 90 tablet, Refills: 1    Associated Diagnoses: Ischemic cardiomyopathy; Status post insertion of drug eluting coronary artery stent       sacubitril-valsartan (Entresto) 24-26 MG TABS Take 1 tablet by mouth in the morning  Qty: 30 tablet, Refills: 5    Associated Diagnoses: Primary hypertension      Sevelamer Carbonate 2.4 g PACK VIGOROUSLY MIX CONTENTS OF 1 PACKET IN WATER (IT WILL NOT DISSOLVE) AND DRINK 3 TIMES DAILY WITH MEALS      Vitamin D3 1.25 MG (85226 UT) capsule TAKE 1 CAPSULE BY MOUTH ONE TIME PER WEEK  Qty: 12 capsule, Refills: 4    Associated Diagnoses: Vitamin D deficiency           No discharge procedures on file.       ED Provider  Electronically Signed by     Marline Watt MD  12/22/24 2100

## 2024-12-23 NOTE — OCCUPATIONAL THERAPY NOTE
Occupational Therapy Evaluation     Patient Name: Asad Galloway  Today's Date: 12/23/2024  Problem List  Principal Problem:    Dizziness  Active Problems:    History of stroke in adulthood    ESRD on dialysis ([facility])    Emotional lability    Chest pain    Chronic systolic heart failure (HCC)    Type 2 diabetes mellitus ([facility])    Ambulatory dysfunction    Past Medical History  Past Medical History:   Diagnosis Date    Cerebrovascular accident (CVA) due to thrombosis of left middle cerebral artery ([facility]) 07/29/2018    Chronic kidney disease     Coronary artery disease     Diabetes mellitus ([facility])     not on meds    Dialysis patient ([facility])     M-W-F    Fistula     left upper arm for hemodialyis    GERD (gastroesophageal reflux disease)     History of coronary artery stent placement     x3    Hypercholesteremia     Hyperlipidemia     Hypertension     Infectious viral hepatitis     B as child    Limb alert care status     no BP/IV left arm    Neuropathy     Nonhealing ulcer of heel ([facility]) 04/25/2023    Obesity     Osteomyelitis ([facility])     last assessed 11/4/16-per son not currently    Penetrating foot wound, left, initial encounter 01/19/2022    PVC's (premature ventricular contractions)     sees  Cardio    Stroke ([facility])     last weeof July 2018 [facility]    TIA (transient ischemic attack) 10/28/2018    Ulcer of left heel, limited to breakdown of skin ([facility]) 06/13/2024    Wears dentures     Wears glasses      Past Surgical History  Past Surgical History:   Procedure Laterality Date    ABDOMINAL SURGERY      CARDIAC CATHETERIZATION N/A 05/02/2022    Procedure: Cardiac Coronary Angiogram;  Surgeon: Sam Davis MD;  [facility];  Service: Cardiology    CARDIAC CATHETERIZATION N/A 05/02/2022    Procedure: Cardiac pci;  Surgeon: Sam Davis MD;  [facility];  Service: Cardiology    CARDIAC CATHETERIZATION  02/01/2023    Procedure: Cardiac catheterization;  Surgeon: Sam  "MD Ryan;  [facility]: [facility];  Service: Cardiology    CARDIAC CATHETERIZATION N/A 02/01/2023    Procedure: Cardiac pci;  Surgeon: Sam Davis MD;  [facility];  Service: Cardiology    CARDIAC CATHETERIZATION N/A 02/01/2023    Procedure: Cardiac Coronary Angiogram;  Surgeon: Sam Davis MD;  [facility];  Service: Cardiology    CARDIAC CATHETERIZATION N/A 02/01/2023    Procedure: Cardiac other-IVUS;  Surgeon: Sam Davis MD;  [facility];  Service: Cardiology    CHOLECYSTECTOMY      Percutaneous    COLONOSCOPY      CYSTOSCOPY      HEMODIALYSIS ADULT  1/22/2024    HEMODIALYSIS ADULT  1/24/2024    IR LOWER EXTREMITY ANGIOGRAM  9/29/2023    IR LOWER EXTREMITY ANGIOGRAM  2/26/2024    OTHER SURGICAL HISTORY      \"stimulator to control bowel movements\"    NJ ESOPHAGOGASTRODUODENOSCOPY TRANSORAL DIAGNOSTIC N/A 09/27/2016    Procedure: ESOPHAGOGASTRODUODENOSCOPY (EGD);  Surgeon: Adele Rowe MD;  Location: [facility] GI [facility];  Service: Gastroenterology    NJ LAPAROSCOPY SURG CHOLECYSTECTOMY N/A 02/29/2016    Procedure: LAPAROSCOPIC CHOLECYSTECTOMY ;  Surgeon: Cliff Roman DO;  Location: [facility];  Service: General    NJ SLCTV CATHJ 3RD+ ORD SLCTV ABDL PEL/LXTR BRNCH Left 9/29/2023    Procedure: Left leg angiogram with intervention;  Surgeon: Michelle Galvan MD;  Location: [facility];  Service: Vascular    NJ SLCTV CATHJ 3RD+ ORD SLCTV ABDL PEL/LXTR BRNCH Left 2/26/2024    Procedure: Left leg angiogram antegrade access, left popliteal angioplasty;  Surgeon: Michelle Galvan MD;  Location: [facility];  Service: Vascular    ROTATOR CUFF REPAIR Right     SPINAL CORD STIMULATOR IMPLANT      \"Medtronic interstim model # 3058- in lower back to control bowel movements-currently turned off-battery is dead\"    TOE AMPUTATION Right 10/28/2016    Procedure: 3RD TOE AMPUTATION ;  Surgeon: Anjel Salas DPM;  Location: [facility];  Service:              " "12/23/24 1236   OT Last Visit   OT Visit Date 12/23/24   Note Type   Note type Evaluation   Pain Assessment   Pain Assessment Tool 0-10   Pain Score No Pain   Restrictions/Precautions   Weight Bearing Precautions Per Order No   Other Precautions Cognitive;Bed Alarm;Chair Alarm;Multiple lines;Telemetry;Fall Risk   Home Living   Type of Home House   Home Layout Two level;Performs ADLs on one level  (bi-level home, [facility] with 0STE)   Bathroom Shower/Tub Walk-in shower   Bathroom Toilet Raised   Bathroom Equipment Grab bars in shower;Shower chair;Grab bars around toilet   Bathroom Accessibility Accessible   Home Equipment Walker;Cane  (used SPC PTA)   Prior Function   Level of [place] Needs assistance with ADLs;Needs assistance with IADLS   Lives With Spouse;Family  (2 kids (28 and 24 y.o.))   Receives Help From Family   IADLs Family/Friend/Other provides transportation;Family/Friend/Other provides meals;Family/Friend/Other provides medication management   Falls in the last 6 months 1 to 4  (1 leading to current admission)   Vocational On disability   Comments Pt reports he is able to perform UB ADLs, feeding, and grooming. wife A with LB ADLs   Lifestyle   Autonomy PTA, pt required A with ADLs/IADLs, A for OOB, ambulated with SPC, (-)    Reciprocal Relationships Wife, kids - per wife, pt is never alone   Service to Others On disability   Intrinsic Gratification Likes family feud and playing cards   General   Additional Pertinent History orthostatic vitals: lying - 82/42, sitting EOB - 110/53, standing - 105/55, sitting post ambulation - 101/53   Family/Caregiver Present Yes   Additional General Comments wife present t/o   Subjective   Subjective \"how long is dialysis gonna be?\"   ADL   Where Assessed Edge of bed   Eating Assistance 5  Supervision/Setup   Grooming Assistance 5  Supervision/Setup   UB Bathing Assistance 4  Minimal Assistance   LB Bathing Assistance 3  Moderate Assistance   UB Dressing " Assistance 4  Minimal Assistance   LB Dressing Assistance 3  Moderate Assistance   Toileting Assistance  3  Moderate Assistance   Bed Mobility   Supine to Sit 4  Minimal assistance   Additional items Assist x 1;HOB elevated;Bedrails;LE management;Increased time required   Sit to Supine 4  Minimal assistance   Additional items Assist x 1;HOB elevated;Bedrails;Increased time required;LE management   Transfers   Sit to Stand 4  Minimal assistance   Additional items Assist x 1;Increased time required   Stand to Sit 4  Minimal assistance   Additional items Assist x 1;Increased time required   Additional Comments transfers with RW   Functional Mobility   Functional Mobility 4  Minimal assistance   Additional items Rolling walker   Balance   Static Sitting Fair   Dynamic Sitting Fair -   Static Standing Fair -   Dynamic Standing Poor +   Ambulatory Poor +   Activity Tolerance   Activity Tolerance Patient limited by fatigue   Medical Staff Made Aware PT Lucy   Nurse Made Aware RN clearance for session + at bedside   RUE Assessment   RUE Assessment WFL   LUE Assessment   LUE Assessment WFL  (~ 3+/5, weaker than R)   Hand Function   Hand Function Comments Decreased finger to nose coordination speed with LUE   Vision - Complex Assessment   Ocular Range of Motion Intact   Tracking Intact   Cognition   Overall Cognitive Status Impaired   Arousal/Participation Responsive;Cooperative   Attention Attends with cues to redirect   Orientation Level Oriented to person;Oriented to place;Oriented to situation  (oriented to month, not year (wife reports this is pt's baseline))   Memory Decreased recall of precautions   Following Commands Follows one step commands with increased time or repetition   Comments Pt pleasant amd cooperative t/o session, decreased overall attention at times. Requires cues to redirect to tasks   Assessment   Limitation Decreased ADL status;Decreased UE ROM;Decreased UE strength;Decreased Safe judgement during  ADL;Decreased cognition;Decreased endurance;Decreased high-level ADLs;Decreased self-care trans   Prognosis Fair   Assessment Pt is a 64 y.o. male admitted to [facility] on 12/22/2024 w/ Dizziness + fall.  has a past medical history of CVA, Chronic kidney disease, Coronary artery disease, Diabetes mellitus, Fistula, GERD, Hypercholesteremia, Hyperlipidemia, Hypertension, Infectious viral hepatitis, Neuropathy, Obesity, Osteomyelitis, PVC's, Stroke, TIA, and Ulcer of left heel. Pt with active OT orders. Pt seen as a co-evaluation with PT due to the patient's co-morbidities, clinically unstable presentation/clinical complexity, and present impairments. As per report, pta, resides with his wife in a bi-level home, [facility], 0E. Pt required A w/  ADLS and IADLS, (-) drove. Upon evaluation, pt currently requires MIN A with RW for transfers and mobility. Pt currently requires S eating, S grooming, MIN A UB ADLs, MOD A LB ADLs, and MOD A toileting. Pt is limited at this time 2*: endurance, activity tolerance, functional mobility, balance, functional standing tolerance, decreased I w/ ADLS/IADLS, strength, cognitive impairments, and decreased safety awareness.The following Occupational Performance Areas to address include: eating, grooming, bathing/shower, toilet hygiene, dressing, health maintenance, functional mobility, community mobility, and clothing management. Pt to benefit from immediate acute skilled OT to address above deficits, improve overall functional independence, maximize fnxl mobility and reduce caregiver burden. From OT standpoint, recommendation at time of d/c would be STR.  Pt was left after session with all current needs met. The patient's raw score on the AM-PAC Daily Activity Inpatient Short Form is 15. A raw score of less than 19 suggests the patient may benefit from discharge to post-acute rehabilitation services. Please refer to the recommendation of the Occupational Therapist for safe discharge planning.    Goals   LTG Time Frame 10-14   Plan   Treatment Interventions ADL retraining;UE strengthening/ROM;Functional transfer training;Endurance training;Cognitive reorientation;Patient/family training;Equipment evaluation/education;Compensatory technique education;Continued evaluation;Energy conservation;Activityengagement   Goal Expiration Date 01/06/25   OT Frequency 2-3x/wk   Discharge Recommendation   Rehab Resource Intensity Level, OT II (Moderate Resource Intensity)   AM-PAC Daily Activity Inpatient   Lower Body Dressing 2   Bathing 2   Toileting 2   Upper Body Dressing 3   Grooming 3   Eating 3   Daily Activity Raw Score 15   Daily Activity Standardized Score (Calc for Raw Score >=11) 34.69   AM-PAC Applied Cognition Inpatient   Following a Speech/Presentation 3   Understanding Ordinary Conversation 4   Taking Medications 2   Remembering Where Things Are Placed or Put Away 3   Remembering List of 4-5 Errands 3   Taking Care of Complicated Tasks 2   Applied Cognition Raw Score 17   Applied Cognition Standardized Score 36.52     GOALS    - Pt will complete UB dressing/self care/bathing w/ MI using adaptive device and DME as needed    - Pt will complete LB dressing/self care/bathing w/ MIN A using adaptive device and DME as needed    - Pt will complete toileting w/ S w/ G hygiene/thoroughness using DME as needed    - Pt will improve functional transfers to MI on/off all surfaces using DME as needed w/ G balance/safety     - Pt will improve functional mobility during ADL/IADL/leisure tasks to MI using DME as needed w/ G balance/safety     - Pt will demonstrate G carryover of pt/caregiver education and training as appropriate.    - Pt will demonstrate 100% carryover of energy conservation techniques t/o functional I/ADL/leisure tasks w/o cues s/p skilled education    - Pt will engage in ongoing cognitive assessment w/ G participation to assist w/ safe d/c planning/recommendations    - Pt will increase static and  dynamic standing/sitting balance to F+ using AD/DME as needed to increase safety and I during fnxl transfers and ADLs    - Pt will maintain upright sitting position for at least 20 min during dynamic fnxl activity with F+ balance and endurance to improve ADL performance and independence, as well as reduce risk of falls       Denisse Wetzel MS, OTR/L

## 2024-12-23 NOTE — DISCHARGE SUMMARY
Discharge Summary - Hospitalist   Name: Asad Galloway 64 y.o. male I MRN: [id number]  Unit/Bed#: ED-22 I Date of Admission: 12/22/2024   Date of Service: 12/23/2024 I Hospital Day: 0     Assessment & Plan  Dizziness  Patient has known episodes of dizziness for which he is prescribed meclizine.  Ongoing dizziness for at least the past week.  Likely due to to low blood pressure  Patient takes Entresto daily, takes metoprolol succinate 25 mg at bedtime on dialysis days and twice a day rest of the days  Recommend to decrease metoprolol succinate to 25 mg daily  Explained to the patient and family.  Agree with the plan.  Patient will follow-up with cardiology outpatient  Chest pain  Chest pain with radiation to left shoulder.  Suspected msk as pain occurred following fall.  Worsens with movement.  Hx multivesel CAD with stent to LAD (2022).  Stent restenosis to LAD treated with ADE.  Unable to stent OM branch.  Troponin 47  EKG sinus, 1st degree AV block, left axis deviation, RBBB, T wave abnormality.  Unremarkable for any new changes    History of stroke in adulthood  No residual deficits  Continue aspirin, Effient, statin  ESRD on dialysis (HCC)  HD Monday Wednesday Friday via left AV fistula  Patient will see if HD today.  Outpatient HD tomorrow and again on Friday per holiday schedule  Chronic systolic heart failure (HCC)  No weight documented on admission but appears stable  Volume management with HD  Continue home meds  Entresto, Toprol    Type 2 diabetes mellitus (HCC)  Lab Results   Component Value Date    HGBA1C 5.5 11/07/2024     Diet controlled  Monitor glucose with a.m. labs  Emotional lability  Maintained on Depakote  Ambulatory dysfunction  Ambulates with cane at baseline.  Last fall 12/15.  Fall precaution  Rehab recommended.  Family declined however.  Will be discharged home with VNA     Medical Problems       Resolved Problems  Date Reviewed: 11/19/2024   None       Discharging Physician / Practitioner:  Fabiola Price MD  PCP: BRITTANY Allen  Admission Date:   Admission Orders (From admission, onward)       Ordered        12/22/24 2046  Place in Observation  Once                          Discharge Date: 12/23/24    Consultations During Hospital Stay:  Nephrology        Please see above list of diagnoses and related plan for additional information.     Condition at Discharge: fair    Discharge Day Visit / Exam:   Subjective:  No further dizziness. Pt wants to go home. Family at bedside  Vitals: Blood Pressure: 113/56 (12/23/24 1915)  Pulse: 80 (12/23/24 1915)  Temperature: 97.8 °F (36.6 °C) (12/23/24 1825)  Temp Source: Oral (12/23/24 1825)  Respirations: 18 (12/23/24 1915)  SpO2: 95 % (12/23/24 1915)  Physical Exam  Constitutional:       General: He is not in acute distress.     Appearance: He is not ill-appearing, toxic-appearing or diaphoretic.   Cardiovascular:      Rate and Rhythm: Normal rate.      Pulses: Normal pulses.   Pulmonary:      Effort: Pulmonary effort is normal. No respiratory distress.      Breath sounds: Normal breath sounds.   Abdominal:      General: Bowel sounds are normal.      Palpations: Abdomen is soft.   Skin:     General: Skin is warm.   Neurological:      Mental Status: He is oriented to person, place, and time.          Discussion with Family: Updated  (son) at bedside.    Discharge instructions/Information to patient and family:   See after visit summary for information provided to patient and family.      Provisions for Follow-Up Care:  See after visit summary for information related to follow-up care and any pertinent home health orders.      Mobility at time of Discharge:   Basic Mobility Inpatient Raw Score: 14  JH-HLM Goal: 4: Move to chair/commode  JH-HLM Achieved: 7: Walk 25 feet or more       Disposition:   Home with VNA    Planned Readmission: no     Discharge Medications:  See after visit summary for reconciled discharge medications  provided to patient and/or family.      Administrative Statements   Discharge Statement:  I have spent a total time of 30 minutes in caring for this patient on the day of the visit/encounter. >30 minutes of time was spent on: Instructions for management.    **Please Note: This note may have been constructed using a voice recognition system**

## 2024-12-23 NOTE — PHYSICAL THERAPY NOTE
PHYSICAL THERAPY EVALUATION NOTE          Patient Name: Asad Galloway  Today's Date: 12/23/2024        AGE:   64 y.o.  Mrn:   [id number]  ADMIT DX:  Dizziness [R42]    Past Medical History:  Past Medical History:   Diagnosis Date    Cerebrovascular accident (CVA) due to thrombosis of left middle cerebral artery (HCC) 07/29/2018    Chronic kidney disease     Coronary artery disease     Diabetes mellitus (HCC)     not on meds    Dialysis patient ([facility])     M-W-F    Fistula     left upper arm for hemodialyis    GERD (gastroesophageal reflux disease)     History of coronary artery stent placement     x3    Hypercholesteremia     Hyperlipidemia     Hypertension     Infectious viral hepatitis     B as child    Limb alert care status     no BP/IV left arm    Neuropathy     Nonhealing ulcer of heel (HCC) 04/25/2023    Obesity     Osteomyelitis ([facility])     last assessed 11/4/16-per son not currently    Penetrating foot wound, left, initial encounter 01/19/2022    PVC's (premature ventricular contractions)     sees  Cardio    Stroke ([facility])     last weeof July 2018 [facility]    TIA (transient ischemic attack) 10/28/2018    Ulcer of left heel, limited to breakdown of skin ([facility]) 06/13/2024    Wears dentures     Wears glasses        Past Surgical History:  Past Surgical History:   Procedure Laterality Date    ABDOMINAL SURGERY      CARDIAC CATHETERIZATION N/A 05/02/2022    Procedure: Cardiac Coronary Angiogram;  Surgeon: Sam Davis MD;  [facility]: [facility] CARD[facility];  Service: Cardiology    CARDIAC CATHETERIZATION N/A 05/02/2022    Procedure: Cardiac pci;  Surgeon: Sam Davis MD;  [facility]: [facility] CARD[facility];  Service: Cardiology    CARDIAC CATHETERIZATION  02/01/2023    Procedure: Cardiac catheterization;  Surgeon: Sam Davis MD;  Location: [facility] CARD[facility];  Service: Cardiology    CARDIAC CATHETERIZATION N/A 02/01/2023    Procedure: Cardiac pci;   "Surgeon: Sam Davis MD;  Location: [facility] CARD[facility]AC [facility];  Service: Cardiology    CARDIAC CATHETERIZATION N/A 2023    Procedure: Cardiac Coronary Angiogram;  Surgeon: Sam Davis MD;  [facility]: [facility];  Service: Cardiology    CARDIAC CATHETERIZATION N/A 2023    Procedure: Cardiac other-IVUS;  Surgeon: Sam Davis MD;  Location: [facility] CARD[facility];  Service: Cardiology    CHOLECYSTECTOMY      Percutaneous    COLONOSCOPY      CYSTOSCOPY      HEMODIALYSIS ADULT  2024    HEMODIALYSIS ADULT  2024    IR LOWER EXTREMITY ANGIOGRAM  2023    IR LOWER EXTREMITY ANGIOGRAM  2024    OTHER SURGICAL HISTORY      \"stimulator to control bowel movements\"    ME ESOPHAGOGASTRODUODENOSCOPY TRANSORAL DIAGNOSTIC N/A 2016    Procedure: ESOPHAGOGASTRODUODENOSCOPY (EGD);  Surgeon: Adele Rowe MD;  Location: [facility] GI LAB;  Service: Gastroenterology    ME LAPAROSCOPY SURG CHOLECYSTECTOMY N/A 2016    Procedure: LAPAROSCOPIC CHOLECYSTECTOMY ;  Surgeon: Cliff Roman DO;  Location: [facility] Main OR;  Service: General    ME SLCTV CATHJ 3RD+ ORD SLCTV ABDL PEL/LXTR BRNC Left 2023    Procedure: Left leg angiogram with intervention;  Surgeon: Michelle Galvan MD;  Location: [facility] MAIN OR;  Service: Vascular    ME SLCTV CATHJ 3RD+ ORD SLCTV ABDL PEL/LXTR BRNCH Left 2024    Procedure: Left leg angiogram antegrade access, left popliteal angioplasty;  Surgeon: Michelle Galvan MD;  Location: [facility] MAIN OR;  Service: Vascular    ROTATOR CUFF REPAIR Right     SPINAL CORD STIMULATOR IMPLANT      \"Medtronic interstim model # 3058- in lower back to control bowel movements-currently turned off-battery is dead\"    TOE AMPUTATION Right 10/28/2016    Procedure: 3RD TOE AMPUTATION ;  Surgeon: Anjel Salas DPM;  Location: [facility] Main OR;  Service:      Length Of Stay: 0        PHYSICAL THERAPY EVALUATION:    Patient's identity confirmed via 2 patient identifiers (full name and ) at start of " session       12/23/24 1212   PT Last Visit   PT Visit Date 12/23/24   Note Type   Note type Evaluation   Pain Assessment   Pain Assessment Tool FLACC   Pain Location/Orientation Location: Head   Pain Onset/Description Frequency: Intermittent   Hospital Pain Intervention(s) Repositioned;Emotional support   Pain Rating: FLACC (Rest) - Face 0   Pain Rating: FLACC (Rest) - Legs 0   Pain Rating: FLACC (Rest) - Activity 0   Pain Rating: FLACC (Rest) - Cry 1   Pain Rating: FLACC (Rest) - Consolability 1   Score: FLACC (Rest) 2   Pain Rating: FLACC (Activity) - Face 0   Pain Rating: FLACC (Activity) - Legs 0   Pain Rating: FLACC (Activity) - Activity 0   Pain Rating: FLACC (Activity) - Cry 0   Pain Rating: FLACC (Activity) - Consolability 0   Score: FLACC (Activity) 0   Restrictions/Precautions   Weight Bearing Precautions Per Order No   Other Precautions Cognitive;Multiple lines;Fall Risk;Telemetry   Home Living   Type of Home House   Home Layout Two level;Performs ADLs on one level;Able to live on main level with bedroom/bathroom  (0 RAFAT, maintains a 1st floor set-up)   Bathroom Shower/Tub Walk-in shower   Bathroom Toilet Raised   Bathroom Equipment Grab bars in shower;Shower chair;Grab bars around toilet   Home Equipment Walker;Cane   Additional Comments Pt maintains a 1st floor set-up, per wife pt does not need to negotiate the stairs w/in the house, sleeps in a flat bed at home   Prior Function   Level of [place] Independent with functional mobility;Needs assistance with ADLs;Needs assistance with IADLS   Lives With Spouse   Receives Help From Family   IADLs Family/Friend/Other provides meals;Family/Friend/Other provides transportation;Family/Friend/Other provides medication management   Falls in the last 6 months 1 to 4  (2 falls per chart review)   Comments At baseline pt amb mod I w/ SPC, wife/family assists w/ ADLs prn   General   Additional Pertinent History pt w/ c/o dizziness w/ changes in positioning  (supine>sit, sit>supine)   Family/Caregiver Present Yes  (pt's wife)   Cognition   Overall Cognitive Status Impaired   Arousal/Participation Cooperative   Attention Attends with cues to redirect   Orientation Level Oriented X4  (oriented to month/day but not year (baseline per wife))   Memory Decreased short term memory;Decreased recall of recent events;Decreased recall of precautions   Following Commands Follows one step commands with increased time or repetition   Comments Pt ID via name and ; pt agreeable to PT eval and mobility, very pleasant throughout   RLE Assessment   RLE Assessment X   Strength RLE   R Ankle Dorsiflexion 4/5   R Ankle Plantar Flexion 4/5   LLE Assessment   LLE Assessment X   Strength LLE   L Ankle Dorsiflexion 4/5   L Ankle Plantar Flexion 4/5   Bed Mobility   Supine to Sit 4  Minimal assistance   Additional items Assist x 1;HOB elevated;Increased time required;Verbal cues;LE management   Sit to Supine 4  Minimal assistance   Additional items Assist x 1;HOB elevated;Verbal cues;LE management;Increased time required   Additional Comments pt w/ c/o dizziness upon initial supine>sit mobility, improved w/ time   Transfers   Sit to Stand 4  Minimal assistance   Additional items Assist x 1;Increased time required;Verbal cues   Stand to Sit 4  Minimal assistance   Additional items Assist x 1;Increased time required;Verbal cues   Ambulation/Elevation   Gait pattern Improper Weight shift;Decreased foot clearance;Short stride;Excessively slow;Decreased hip extension;Forward Flexion;Wide JOSE DAVID   Gait Assistance 4  Minimal assist   Additional items Assist x 1;Verbal cues   Assistive Device Rolling walker   Distance 30'   Balance   Static Sitting Fair +   Dynamic Sitting Fair   Static Standing Fair -  (w/ RW)   Dynamic Standing Poor +   Ambulatory Poor +  (w/ RW)   Activity Tolerance   Activity Tolerance Patient limited by fatigue   Medical Staff Made Aware Pt benefited from PT/OT care coordination w/  OT Denisse due to to allow for challenge of pt's activity tolerance, PT and OT goals were addressed individually during session   Nurse Made Aware THIERNO Henderson, present at bedside and for supine, sit, standing BP readings   Assessment   Prognosis Fair   Problem List Decreased strength;Decreased endurance;Impaired balance;Decreased mobility;Decreased cognition;Impaired judgement;Pain   Assessment Asad Galloway is a 64 y.o. Male who presents to [facility] on 12/22/24 due to dizziness and diagnosis of dizziness. Orders for PT eval and treat received. Comorbidities affecting pt's functional mobility at time of evaluation include: CVA, ESRD on dialysis, emotional lability, DM, ambulatory dysfunction, CAD. Personal factors affecting DC include: ambulating w/ assistive device, decreased cognition, positive fall history, limited insight into impairments, and inability to perform IADLs. At baseline, pt mobilizes mod I w/ SPC, and w/ 1-4 fall(s) in the previous 6 months. Upon evaluation, pt presents w/ the following deficits: impaired strength, impaired balance, impaired cognition, decreased safety awareness, decreased endurance/activity tolerance, and gait deviations. Pt currently requires  min Ax1 for bed mobility, min Ax1 for transfers, min Ax1 w/ RW for ambulation. Pt's clinical presentation is unstable/unpredictable due to poor blood pressure control, abnormal lab values, need for increased assistance w/ functional mobility compared to baseline, need for input for mobility technique, need for input for task focus, need to input for safety awareness, recent drastic decline in mobility status, recent h/o falls, ongoing medical management. From a PT/mobility standpoint given the above findings, DC recommendation is level: II (Moderate Rehab Resource Intensity). During current admission, pt will benefit from continued skilled inpatient PT in the acute care setting in order to address the above deficits and to maximize function and  mobility prior to DC from acute care.   Goals   STG Expiration Date 01/02/25   Short Term Goal #1 Pt will: perform bed mobility w/ mod I to decrease pt's burden of care and increase pt's independence w/ repositioning in bed; perform transfers w/ mod I to promote OOB mobility; ambulate 100' w/ LRAD and mod I to increase pt's ambulatory endurance/tolerance; increase all balance ratings by at least 1 grade to decrease pt's risk of falls   PT Treatment Day 0   Plan   Treatment/Interventions Functional transfer training;LE strengthening/ROM;Therapeutic exercise;Endurance training;Cognitive reorientation;Patient/family training;Equipment eval/education;Bed mobility;Gait training;Compensatory technique education   PT Frequency 3-5x/wk   Discharge Recommendation   Rehab Resource Intensity Level, PT II (Moderate Resource Intensity)   Equipment Recommended Walker   Walker Package Recommended Wheeled walker   Change/add to Walker Package? No   AM-PAC Basic Mobility Inpatient   Turning in Flat Bed Without Bedrails 2   Lying on Back to Sitting on Edge of Flat Bed Without Bedrails 2   Moving Bed to Chair 3   Standing Up From Chair Using Arms 3   Walk in Room 3   Climb 3-5 Stairs With Railing 1   Basic Mobility Inpatient Raw Score 14   Basic Mobility Standardized Score 35.55   [facility] Highest Level Of Mobility   -[facility] Goal 4: Move to chair/commode   -[facility] Achieved 7: Walk 25 feet or more   End of Consult   Patient Position at End of Consult Supine;All needs within reach  (HOB elevated on ED stretcher, wife present at bedside)         Vitals:    12/23/24 1215 12/23/24 1219 12/23/24 1223    BP: (S) (!) 82/42 110/53 105/55    BP Location:  Right arm Right arm    Pulse: 69 70 70    Resp: 18 18 20    Pt location Supine Sitting at EOB Standing    SpO2: 95%             The patient's AM-PAC Basic Mobility Inpatient Short Form Raw Score is 14. A Raw score of less than or equal to 16 suggests the patient may benefit from discharge to  post-acute rehabilitation services. Please also refer to the recommendation of the Physical Therapist for safe discharge planning.    Pt will benefit from skilled inpatient PT during this admission in order to facilitate progress towards goals and to maximize functional independence prior to DC      DC rec: level II (Moderate Rehab Resource Intensity)        Lucy Tran, PT, DPT  12/23/24

## 2024-12-23 NOTE — ASSESSMENT & PLAN NOTE
Chest pain with radiation to left shoulder.  Suspected msk as pain occurred following fall.  Worsens with movement.  Hx multivesel CAD with stent to LAD (2022).  Stent restenosis to LAD treated with ADE.  Unable to stent OM branch.  Troponin 47  EKG sinus, 1st degree AV block, left axis deviation, RBBB, T wave abnormality.  Unremarkable for any new changes

## 2024-12-23 NOTE — ASSESSMENT & PLAN NOTE
No weight documented on admission but appears stable  Volume management with HD  Continue home meds  Entresto Toprol

## 2024-12-23 NOTE — PROGRESS NOTES
Patient:  OMAR ANDERSON    MRN:  [id number]    Cmin Request ID:  [id number]    Level of care reserved:    Partner Reserved:    Clinical needs requested:    Geography searched:  [id number]    Start of Service:    Request sent:  2:30pm EST on 12/23/2024 by Nikkie Braswell    Partner reserved:  by Nikkie Braswell    Choice list shared:  3:26pm EST on 12/23/2024 by Nikkie Braswell

## 2024-12-23 NOTE — ASSESSMENT & PLAN NOTE
Wt Readings from Last 3 Encounters:   12/19/24 95.3 kg (210 lb)   12/15/24 99.9 kg (220 lb 3.8 oz)   11/19/24 94.3 kg (208 lb)   EF 8/2024: EF 50%    On Entresto QHS and toprol- with hold parameters   Please give Entresto at night to avoid hypotension on the mornings of HD  Volume management with HD   1800 mL fluid restriction  Management per primary team

## 2024-12-23 NOTE — PLAN OF CARE
Problem: PHYSICAL THERAPY ADULT  Goal: Performs mobility at highest level of function for planned discharge setting.  See evaluation for individualized goals.  Description: Treatment/Interventions: Functional transfer training, LE strengthening/ROM, Therapeutic exercise, Endurance training, Cognitive reorientation, Patient/family training, Equipment eval/education, Bed mobility, Gait training, Compensatory technique education  Equipment Recommended: Walker       See flowsheet documentation for full assessment, interventions and recommendations.  12/23/2024 1338 by Lucy Tran PT  Note: Prognosis: Fair  Problem List: Decreased strength, Decreased endurance, Impaired balance, Decreased mobility, Decreased cognition, Impaired judgement, Pain  Assessment: Asad Galloway is a 64 y.o. Male who presents to [facility] on 12/22/24 due to dizziness and diagnosis of dizziness. Orders for PT eval and treat received. Comorbidities affecting pt's functional mobility at time of evaluation include: CVA, ESRD on dialysis, emotional lability, DM, ambulatory dysfunction, CAD. Personal factors affecting DC include: ambulating w/ assistive device, decreased cognition, positive fall history, limited insight into impairments, and inability to perform IADLs. At baseline, pt mobilizes mod I w/ SPC, and w/ 1-4 fall(s) in the previous 6 months. Upon evaluation, pt presents w/ the following deficits: impaired strength, impaired balance, impaired cognition, decreased safety awareness, decreased endurance/activity tolerance, and gait deviations. Pt currently requires  min Ax1 for bed mobility, min Ax1 for transfers, min Ax1 w/ RW for ambulation. Pt's clinical presentation is unstable/unpredictable due to poor blood pressure control, abnormal lab values, need for increased assistance w/ functional mobility compared to baseline, need for input for mobility technique, need for input for task focus, need to input for safety awareness, recent drastic  decline in mobility status, recent h/o falls, ongoing medical management. From a PT/mobility standpoint given the above findings, DC recommendation is level: II (Moderate Rehab Resource Intensity). During current admission, pt will benefit from continued skilled inpatient PT in the acute care setting in order to address the above deficits and to maximize function and mobility prior to DC from acute care.        Rehab Resource Intensity Level, PT: II (Moderate Resource Intensity)    See flowsheet documentation for full assessment.

## 2024-12-23 NOTE — CASE MANAGEMENT
Case Management Discharge Planning Note    Patient name Asad Galloway  Location ED-22/ED-22 MRN [id number]  : 1960 Date 2024       Current Admission Date: 2024  Current Admission Diagnosis:Dizziness   Patient Active Problem List    Diagnosis Date Noted Date Diagnosed    Type 2 diabetes mellitus ([facility]) 2024     Ambulatory dysfunction 2024     Thrombocytopenia ([facility]) 2024     Chronic deep vein thrombosis (DVT) of distal vein of right lower extremity ([facility]) 2024     Bicytopenia 2024     Obesity (BMI 30.0-34.9) 2024     QT prolongation 2024     Acute embolism and thrombosis of right peroneal vein ([facility]) 2024     Right ankle pain 2024     Pre-diabetes 2024     Hypertensive heart and chronic kidney disease with heart failure and with stage 5 chronic kidney disease, or end stage renal disease ([facility]) 2024     PAD (peripheral artery disease) ([facility]) 2024     Chronic systolic heart failure ([facility]) 2024     Edema of left lower extremity 2023     Rectal bleeding 2023     Elevated troponin 2023     Presence of external cardiac defibrillator 2023     Diabetic polyneuropathy associated with type 2 diabetes mellitus ([facility]) 2023     Absence of toe of right foot ([facility]) 2023     Hammer toes of both feet 2023     Ischemic cardiomyopathy 2023     Moderate AS (aortic stenosis) 2023     Mood disorder ([facility]) 2023     Old myocardial infarction 2023     Hyponatremia 2023     Fall 2023     SOB (shortness of breath) 10/21/2022     Left arm swelling 10/21/2022     CAD, multiple vessel 2022     Electrolyte abnormality 2022     Chest pain 2022     Generalized weakness 2022     Emotional lability 2022     History of 2019 novel coronavirus disease (COVID-19) 2020     ESRD on dialysis ([facility]) 2020     Anemia 10/05/2020     S/P arteriovenous (AV)  fistula creation 04/27/2020     Pre-kidney transplant, listed 04/27/2020     Urge incontinence 12/20/2019     Anxiety associated with depression 02/28/2019     History of stroke in adulthood 10/04/2018     Abnormal EEG 09/24/2018     Other constipation 08/17/2018     GERD (gastroesophageal reflux disease) 08/13/2018     Elevated alkaline phosphatase level 08/03/2018     Nephrotic range proteinuria 03/28/2018     Dizziness 02/26/2016     Mixed hyperlipidemia 02/26/2016     Antiplatelet or antithrombotic long-term use 02/26/2016       LOS (days): 0  Geometric Mean LOS (GMLOS) (days):   Days to GMLOS:     OBJECTIVE:            Current admission status: Observation   Preferred Pharmacy:   [id number] - [place] - [address]  [address] [place] [address]  Phone: [phone] Fax: [phone]    Primary Care Provider: BRITTANY Allen    Primary Insurance: MEDICARE  Secondary Insurance: [facility]    DISCHARGE DETAILS:    Discharge planning discussed with:: Patient spouse  Freedom of Choice: Yes  Comments - Freedom of Choice: HHC choice list provided  CM contacted family/caregiver?: Yes (Spouse present at bedside)  Were Treatment Team discharge recommendations reviewed with patient/caregiver?: Yes  Did patient/caregiver verbalize understanding of patient care needs?: Yes  Were patient/caregiver advised of the risks associated with not following Treatment Team discharge recommendations?: Yes    Contacts  Patient Contacts: Patient spouse  Relationship to Patient:: Family  Contact Method: In Person  Reason/Outcome: Continuity of Care, Emergency Contact, Referral, Discharge Planning    Requested Home Health Care         Is the patient interested in HHC at discharge?: Yes  Home Health Discipline requested:: Home Health Aide, Nursing, Occupational Therapy, Physical Therapy  Home Health Agency Name:: Other (TBD)  Home Health Follow-Up Provider:: PCP  Home Health Services  Needed:: Evaluate Functional Status and Safety, Gait/ADL Training, Strengthening/Theraputic Exercises to Improve Function, Diabetes Management, Heart Failure Management  Homebound Criteria Met:: Uses an Assist Device (i.e. cane, walker, etc), Requires the Assistance of Another Person for Safe Ambulation or to Leave the Home  Supporting Clincal Findings:: Limited Endurance, Fatigues Easliy in Short Distances    DME Referral Provided  Referral made for DME?: No    Other Referral/Resources/Interventions Provided:  Interventions: C  Referral Comments: CM spoke with pt spouse at bedside, [facility] choice list provided for review. Spouse reported she will speak with her son and will notify CM once preferred agency is established. CM will remain available.    Would you like to participate in our [facility] service program?  : No - Declined    Treatment Team Recommendation: Short Term Rehab  Discharge Destination Plan:: Home with Home Health Care

## 2024-12-23 NOTE — DISCHARGE INSTR - AVS FIRST PAGE
Dear Asad Galloway,     It was our pleasure to care for you here at [facility].  It is our hope that we were always able to exceed the expected standards for your care during your stay.  You were hospitalized due to Dizziness and low BP.  You were cared for on the *** floor under the service of Fabiola Price MD with the [facility]ist [facility] who covers for your primary care physician (PCP), BRITTANY Allen, while you were hospitalized.  If you have any questions or concerns related to this hospitalization, you may contact us at .  For follow up as well as medication refills, we recommend that you follow up with your primary care physician.  A registered nurse will reach out to you by phone within a few days after your discharge to answer any additional questions that you may have after going home.  However, at this time we provide for you here, the most important instructions / recommendations at discharge:     Notable Medication Adjustments -   Increase metoprolol XL to 25 mg daily  Testing Required after Discharge -   Serial blood pressure checks at home  ** Please contact your PCP to request testing orders for any of the testing recommended here **  Important follow up information -   Follow-up with your cardiologist  PCP  Other Instructions -     Please review this entire after visit summary as additional general instructions including medication list, appointments, activity, diet, any pertinent wound care, and other additional recommendations from your care team that may be provided for you.      Sincerely,     Fabiola Price MD

## 2024-12-23 NOTE — ASSESSMENT & PLAN NOTE
Chest pain with radiation to left shoulder.  Suspected msk as pain occurred following fall.  Worsens with movement.  Hx multivesel CAD with stent to LAD (2022).  Stent restenosis to LAD treated with ADE.  Unable to stent OM branch.  Troponin 47  EKG sinus, 1st degree AV block, left axis deviation, RBBB, T wave abnormality.  Echo 8/2024 EF 50%, severe diastolic dysfunction  Trend troponin, serial EKG  Telemetry   Pain control: [place], Tylenol

## 2024-12-23 NOTE — CASE MANAGEMENT
Case Management Assessment & Discharge Planning Note    Patient name Asad Galloway  Location ED-22/ED-22 MRN [id number]  : 1960 Date 2024       Current Admission Date: 2024  Current Admission Diagnosis:Dizziness   Patient Active Problem List    Diagnosis Date Noted Date Diagnosed    Type 2 diabetes mellitus ([facility]) 2024     Ambulatory dysfunction 2024     Thrombocytopenia ([facility]) 2024     Chronic deep vein thrombosis (DVT) of distal vein of right lower extremity ([facility]) 2024     Bicytopenia 2024     Obesity (BMI 30.0-34.9) 2024     QT prolongation 2024     Acute embolism and thrombosis of right peroneal vein ([facility]) 2024     Right ankle pain 2024     Pre-diabetes 2024     Hypertensive heart and chronic kidney disease with heart failure and with stage 5 chronic kidney disease, or end stage renal disease ([facility]) 2024     PAD (peripheral artery disease) ([facility]) 2024     Chronic systolic heart failure ([facility]) 2024     Edema of left lower extremity 2023     Rectal bleeding 2023     Elevated troponin 2023     Presence of external cardiac defibrillator 2023     Diabetic polyneuropathy associated with type 2 diabetes mellitus ([facility]) 2023     Absence of toe of right foot ([facility]) 2023     Hammer toes of both feet 2023     Ischemic cardiomyopathy 2023     Moderate AS (aortic stenosis) 2023     Mood disorder ([facility]) 2023     Old myocardial infarction 2023     Hyponatremia 2023     Fall 2023     SOB (shortness of breath) 10/21/2022     Left arm swelling 10/21/2022     CAD, multiple vessel 2022     Electrolyte abnormality 2022     Chest pain 2022     Generalized weakness 2022     Emotional lability 2022     History of 2019 novel coronavirus disease (COVID-19) 2020     ESRD on dialysis ([facility]) 2020     Anemia 10/05/2020     S/P  arteriovenous (AV) fistula creation 04/27/2020     Pre-kidney transplant, listed 04/27/2020     Urge incontinence 12/20/2019     Anxiety associated with depression 02/28/2019     History of stroke in adulthood 10/04/2018     Abnormal EEG 09/24/2018     Other constipation 08/17/2018     GERD (gastroesophageal reflux disease) 08/13/2018     Elevated alkaline phosphatase level 08/03/2018     Nephrotic range proteinuria 03/28/2018     Dizziness 02/26/2016     Mixed hyperlipidemia 02/26/2016     Antiplatelet or antithrombotic long-term use 02/26/2016       LOS (days): 0  Geometric Mean LOS (GMLOS) (days):   Days to GMLOS:     OBJECTIVE:              Current admission status: Observation       Preferred Pharmacy:   [facility] #[id number] - [place] - [address]  [address] [place] [address]  Phone: [phone] Fax: [phone]    Primary Care Provider: BRITTANY Allen    Primary Insurance: MEDICARE  Secondary Insurance: [employer]    ASSESSMENT:  Active Health Care Proxies       Laverne Eliana [facility] Representative - Spouse   Primary Phone: [phone] (Mobile)                           Readmission Root Cause  30 Day Readmission: No    Patient Information  Admitted from:: Home  Mental Status: Alert  During Assessment patient was accompanied by: Spouse, Son  Assessment information provided by:: Spouse, Son, Patient  Primary Caregiver: Spouse  Caregiver's Name:: Eliana Galloway  Caregiver's Relationship to Patient:: Family Member  Caregiver's Telephone Number:: [phone]  Support Systems: Self, Spouse/significant other, Children, Family members  County of Residence: [place]  What city do you live in?: [place]  Home entry access options. Select all that apply.: Garage, No steps to enter home  Type of Current Residence: Bi-level  Upon entering residence, is there a bedroom on the main floor (no further steps)?: Yes  Upon entering residence, is there a bathroom on the  main floor (no further steps)?: Yes  Living Arrangements: Lives w/ Spouse/significant other, Lives w/ Children    Activities of Daily Living Prior to Admission  Functional Status: Assistance  Completes ADLs independently?: No  Level of ADL dependence: Assistance  Ambulates independently?: Yes  Does patient use assisted devices?: Yes  Assisted Devices (DME) used: Walker, Straight Cane, Wheelchair  Does patient have a history of Outpatient Therapy (PT/OT)?: Yes (Current)  Does the patient have a history of Short-Term Rehab?: No  Does patient have a history of HHC?: Yes  Does patient currently have HHC?: No    Current Home Health Care  Home Health Agency Name:: Other (TBD)    Patient Information Continued  Income Source: Pension/USP  Does patient have prescription coverage?: Yes  Does patient receive dialysis treatments?: Yes ([facility])  Does patient have a history of substance abuse?: No  Does patient have a history of Mental Health Diagnosis?: No         Means of Transportation  Means of Transport to Appts:: Family transport          DISCHARGE DETAILS:    Discharge planning discussed with:: Patient, spouse, son  Freedom of Choice: Yes  Comments - Freedom of Choice: CM reviewed PT rec level 2. Pt/family reported preference of returning home with HHC. Agreeable to blanket referrals.  CM contacted family/caregiver?: Yes (Family present at bedside)  Were Treatment Team discharge recommendations reviewed with patient/caregiver?: Yes  Did patient/caregiver verbalize understanding of patient care needs?: Yes  Were patient/caregiver advised of the risks associated with not following Treatment Team discharge recommendations?: Yes    Contacts  Patient Contacts: Patient, spouse, son  Relationship to Patient:: Family  Contact Method: In Person  Reason/Outcome: Continuity of Care, Emergency Contact, Referral, Discharge Planning    Requested Home Health Care         Is the patient interested in HHC at discharge?: Yes  Home  Health Discipline requested:: Home Health Aide, Nursing, Occupational Therapy, Physical Therapy  Home Health Agency Name:: Other (TBD)  Home Health Follow-Up Provider:: PCP  Home Health Services Needed:: Evaluate Functional Status and Safety, Gait/ADL Training, Strengthening/Theraputic Exercises to Improve Function, Diabetes Management, Heart Failure Management  Homebound Criteria Met:: Uses an Assist Device (i.e. cane, walker, etc), Requires the Assistance of Another Person for Safe Ambulation or to Leave the Home  Supporting Clincal Findings:: Limited Endurance, Fatigues Easliy in Short Distances    DME Referral Provided  Referral made for DME?: No    Other Referral/Resources/Interventions Provided:  Interventions: HHC  Referral Comments: CM spoke with pt, spouse Eliana, and son Sam at bedside; introduced self and role with dcp. Pt reported he lvies with his spouse and children in a bilevel home, no RAFAT through the garage. Pt reported he ahs a FFSU. Pt reported spouse assists him with care as needed. Pt reported he has a cane, RW, and WC. Pt reported he is current with OPPT. Pt reported [facility] and PCP is Dr. Auguste. Family assists with all transportation needs. CM reviewed PT rec level 2. Pt/family reported preference of pt [facility], with request for a HHA. Pt/family aware HHA can be added onto to script, however this will not be 24/7 and aide may only be available for a limited number of scheduled visits. Pt/family expressed understanding of same. CM sent blanket referrals via [employer]. Pt/family aware choice list will be provided so preference can be established. CM will remain available.    Would you like to participate in our [facility] service program?  : No - Declined    Treatment Team Recommendation: Short Term Rehab  Discharge Destination Plan:: Home with Home Health Care

## 2024-12-23 NOTE — HEMODIALYSIS
Target UF Goal 2 L as tolerated. Patient dialyzing for 3 hours on 2 K bath for serum K of  4.9  per protocol. Treatment plan reviewed with Nephrology.

## 2024-12-23 NOTE — PLAN OF CARE
Problem: OCCUPATIONAL THERAPY ADULT  Goal: Performs self-care activities at highest level of function for planned discharge setting.  See evaluation for individualized goals.  Description: Treatment Interventions: ADL retraining, UE strengthening/ROM, Functional transfer training, Endurance training, Cognitive reorientation, Patient/family training, Equipment evaluation/education, Compensatory technique education, Continued evaluation, Energy conservation, Activityengagement          See flowsheet documentation for full assessment, interventions and recommendations.   Note: Limitation: Decreased ADL status, Decreased UE ROM, Decreased UE strength, Decreased Safe judgement during ADL, Decreased cognition, Decreased endurance, Decreased high-level ADLs, Decreased self-care trans  Prognosis: Fair  Assessment: Pt is a 64 y.o. male [facility]/22[facility]20[facility] w/ Dizziness + fall.  has a past medical history of CVA, Chronic kidney disease, Coronary artery disease, Diabetes mellitus, Fistula, GERD, Hypercholesteremia, Hyperlipidemia, Hypertension, Infectious viral hepatitis, Neuropathy, Obesity, Osteomyelitis, PVC's, Stroke, TIA, and Ulcer of left heel. Pt with active OT orders. Pt seen as a co-evaluation with PT due to the patient's co-morbidities, clinically unstable presentation/clinical complexity, and present impairments. As per report, pta, resides with his wife in a bi-level home, FFSU, 0STE. Pt required A w/  ADLS and IADLS, (-) drove. Upon evaluation, pt currently requires MIN A with RW for transfers and mobility. Pt currently requires S eating, S grooming, MIN A UB ADLs, MOD A LB ADLs, and MOD A toileting. Pt is limited at this time 2*: endurance, activity tolerance, functional mobility, balance, functional standing tolerance, decreased I w/ ADLS/IADLS, strength, cognitive impairments, and decreased safety awareness.The following Occupational Performance Areas to address include: eating, grooming,  bathing/shower, toilet hygiene, dressing, health maintenance, functional mobility, community mobility, and clothing management. Pt to benefit from immediate acute skilled OT to address above deficits, improve overall functional independence, maximize fnxl mobility and reduce caregiver burden. From OT standpoint, recommendation at time of d/c would be STR.  Pt was left after session with all current needs met. The patient's raw score on the -PAC Daily Activity Inpatient Short Form is 15. A raw score of less than 19 suggests the patient may benefit from discharge to post-acute rehabilitation services. Please refer to the recommendation of the Occupational Therapist for safe discharge planning.     Rehab Resource Intensity Level, OT: II (Moderate Resource Intensity)

## 2024-12-23 NOTE — ASSESSMENT & PLAN NOTE
Lab Results   Component Value Date    HGBA1C 5.5 11/07/2024       Recent Labs     12/23/24  0309   POCGLU 103       Blood Sugar Average: Last 72 hrs:  (P) 103  Controlled with diet  Management per primary team

## 2024-12-23 NOTE — CONSULTS
Consultation - Nephrology   Name: Asad Galloway 64 y.o. male I MRN: [id number]  Unit/Bed#: ED-22 I Date of Admission: 12/22/2024   Date of Service: 12/23/2024 I Hospital Day: 0   Inpatient consult to Nephrology  Consult performed by: Denisse Sood PA-C  Consult ordered by: BRITTANY Pickard        Physician Requesting Evaluation: Fabiola Price MD   Reason for Evaluation / Principal Problem: ESRD on HD     Assessment & Plan  ESRD on dialysis (HCC)  Lab Results   Component Value Date    EGFR 6 12/23/2024    EGFR 7 12/22/2024    EGFR 7 12/15/2024    CREATININE 7.59 (H) 12/23/2024    CREATININE 7.13 (H) 12/22/2024    CREATININE 6.73 (H) 12/15/2024   MWF at [facility]- last HD 12/20     Schedule this week: Mon, Tues, Fri due to holiday  Access: Left AV fistula   EDW: 94kg  2 recent increases in EDW due to patient not tolerating a higher UF secondary to hypotension   CXR 12/22 with vascular congestion  Plan for HD today and tomorrow    Acute chest pain  Presented with chest pain with radiation to the left shoulder. Occurred following a fall on 12/15   History of CAD with stent  Management per primary team  Dizziness  Patient presented with dizziness x1 week (since fall on 12/15)   Has known episodes of dizziness, is prescribed meclizine. However, patient reports hypotension with this  Continue to hold meclizine in the setting of hypotension   Monitor BP  Management per primary team   Chronic systolic heart failure (HCC)  Wt Readings from Last 3 Encounters:   12/19/24 95.3 kg (210 lb)   12/15/24 99.9 kg (220 lb 3.8 oz)   11/19/24 94.3 kg (208 lb)   EF 8/2024: EF 50%    On Entresto QHS and toprol- with hold parameters   Please give Entresto at night to avoid hypotension on the mornings of HD  Volume management with HD   1800 mL fluid restriction  Management per primary team   Hypotension  BP ranging from 75/47 to 112/56  Of note, was given a dose of Entresto this morning in the ED  Orthostatics 12/23 negative   "  Management per primary team    Anemia  History of CVA, does not appear to be on any BAYLEE per chart review  Continue to monitor at this time  Type 2 diabetes mellitus (HCC)  Lab Results   Component Value Date    HGBA1C 5.5 11/07/2024       Recent Labs     12/23/24  0309   POCGLU 103       Blood Sugar Average: Last 72 hrs:  (P) 103  Controlled with diet  Management per primary team   First degree AV block  Management per primary team  Long QT interval  Management per primary team       History of Present Illness   Asad Galloway is a 64 y.o. male with a PMH of ESRD on HD, CHF, HTN, DM, HLD, CVA, CAD, GERD who was admitted to [facility] after presenting with dizziness for about 1 week and chest pain that has been present since 12/15 following a mechanical fall. Of note, patient denies any syncopal event/ dizziness prior to this fall. A renal consultation is requested today for assistance in the management of ESRD on HD.    Patient seen and examined at bedside. Notes pain secondary to his fall on 12/15 and continued dizziness. Patient's wife present.     Review of Systems   Respiratory:  Negative for shortness of breath.    Cardiovascular:  Negative for chest pain.   Musculoskeletal:         Pain at site of fall/ bruising    Neurological:  Positive for dizziness (\"room spinning\").   All other systems reviewed and are negative.    I have reviewed the patient's PMH, PSH, Social History, Family History, Meds, and Allergies  Historical Information   Past Medical History:   Diagnosis Date    Cerebrovascular accident (CVA) due to thrombosis of left middle cerebral artery (HCC) 07/29/2018    Chronic kidney disease     Coronary artery disease     Diabetes mellitus (HCC)     not on meds    Dialysis patient (HCC)     M-W-F    Fistula     left upper arm for hemodialyis    GERD (gastroesophageal reflux disease)     History of coronary artery stent placement     x3    Hypercholesteremia     Hyperlipidemia     Hypertension     Infectious " "viral hepatitis     B as child    Limb alert care status     no BP/IV left arm    Neuropathy     Nonhealing ulcer of heel (HCC) 04/25/2023    Obesity     Osteomyelitis (HCC)     last assessed 11/4/16-per son not currently    Penetrating foot wound, left, initial encounter 01/19/2022    PVC's (premature ventricular contractions)     sees  Cardio    Stroke (HCC)     last weeof July 2018 [facility]    TIA (transient ischemic attack) 10/28/2018    Ulcer of left heel, limited to breakdown of skin (HCC) 06/13/2024    Wears dentures     Wears glasses      Past Surgical History:   Procedure Laterality Date    ABDOMINAL SURGERY      CARDIAC CATHETERIZATION N/A 05/02/2022    Procedure: Cardiac Coronary Angiogram;  Surgeon: Sam Davis MD;  [facility];  Service: Cardiology    CARDIAC CATHETERIZATION N/A 05/02/2022    Procedure: Cardiac pci;  Surgeon: Sam Davis MD;  [facility];  Service: Cardiology    CARDIAC CATHETERIZATION  02/01/2023    Procedure: Cardiac catheterization;  Surgeon: Sam Davis MD;  [facility];  Service: Cardiology    CARDIAC CATHETERIZATION N/A 02/01/2023    Procedure: Cardiac pci;  Surgeon: Sam Davis MD;  [facility];  Service: Cardiology    CARDIAC CATHETERIZATION N/A 02/01/2023    Procedure: Cardiac Coronary Angiogram;  Surgeon: Sam Davis MD;  [facility];  Service: Cardiology    CARDIAC CATHETERIZATION N/A 02/01/2023    Procedure: Cardiac other-IVUS;  Surgeon: Sam Davis MD;  [facility];  Service: Cardiology    CHOLECYSTECTOMY      Percutaneous    COLONOSCOPY      CYSTOSCOPY      HEMODIALYSIS ADULT  1/22/2024    HEMODIALYSIS ADULT  1/24/2024    IR LOWER EXTREMITY ANGIOGRAM  9/29/2023    IR LOWER EXTREMITY ANGIOGRAM  2/26/2024    OTHER SURGICAL HISTORY      \"stimulator to control bowel movements\"    TX ESOPHAGOGASTRODUODENOSCOPY TRANSORAL DIAGNOSTIC N/A 09/27/2016 " "   Procedure: ESOPHAGOGASTRODUODENOSCOPY (EGD);  Surgeon: Adele Rowe MD;  Location: [facility] GI [facility];  Service: Gastroenterology    ID LAPAROSCOPY SURG CHOLECYSTECTOMY N/A 02/29/2016    Procedure: LAPAROSCOPIC CHOLECYSTECTOMY ;  Surgeon: Cliff Roman DO;  Location: [facility];  Service: General    ID SLCTV CATHJ 3RD+ ORD SLCTV ABDL PEL/LXTR BRNCH Left 9/29/2023    Procedure: Left leg angiogram with intervention;  Surgeon: Michelle Galvan MD;  Location: [facility];  Service: Vascular    ID SLCTV CATHJ 3RD+ ORD SLCTV ABDL PEL/LXTR BRNCH Left 2/26/2024    Procedure: Left leg angiogram antegrade access, left popliteal angioplasty;  Surgeon: Michelle Galvan MD;  Location: [facility];  Service: Vascular    ROTATOR CUFF REPAIR Right     SPINAL CORD STIMULATOR IMPLANT      \"Medtronic interstim model # 3058- in lower back to control bowel movements-currently turned off-battery is dead\"    TOE AMPUTATION Right 10/28/2016    Procedure: 3RD TOE AMPUTATION ;  Surgeon: Anjel Salas DPM;  Location[facility];  Service:      Social History     Tobacco Use    Smoking status: Never    Smokeless tobacco: Never   Vaping Use    Vaping status: Never Used   Substance and Sexual Activity    Alcohol use: Never    Drug use: No    Sexual activity: Not Currently     Partners: Female     Comment: defer     E-Cigarette/Vaping    E-Cigarette Use Never User      E-Cigarette/Vaping Substances    Nicotine No     THC No     CBD No     Flavoring No     Other No     Unknown No        Social History     Tobacco Use    Smoking status: Never    Smokeless tobacco: Never   Vaping Use    Vaping status: Never Used   Substance and Sexual Activity    Alcohol use: Never    Drug use: No    Sexual activity: Not Currently     Partners: Female     Comment: defer       Current Facility-Administered Medications:     acetaminophen (TYLENOL) tablet 975 mg, Q8H [facility]    aspirin chewable tablet 81 mg, Daily    atorvastatin (LIPITOR) tablet 40 mg, Daily " With Dinner    calcitriol (ROCALTROL) capsule 0.5 mcg, Once per day on Monday Wednesday Friday    cinacalcet (SENSIPAR) tablet 120 mg, Once per day on Monday Wednesday Friday    divalproex sodium (DEPAKOTE SPRINKLE) capsule 250 mg, Q12H [facility]    heparin (porcine) subcutaneous injection 5,000 Units, Q8H [facility]    menthol-methyl salicylate (BENGAY) 10-15 % cream, 4x Daily PRN    metoprolol succinate (TOPROL-XL) 24 hr tablet 25 mg, BID    midodrine (PROAMATINE) tablet 5 mg, Demand    nitroglycerin (NITROSTAT) SL tablet 0.4 mg, Q5 Min PRN    prasugrel (EFFIENT) tablet 5 mg, Daily    sacubitril-valsartan (ENTRESTO) 24-26 MG per tablet 1 tablet, Daily    sevelamer (RENAGEL) tablet 2,400 mg, TID With Meals  Prior to Admission Medications   Prescriptions Last Dose Informant Patient Reported? Taking?   MIDODRINE HCL PO  Child Yes No   Sig: Take 5 mg by mouth PRN w/ dialysis   Sevelamer Carbonate 2.4 g PACK  Child Yes No   Sig: VIGOROUSLY MIX CONTENTS OF 1 PACKET IN WATER (IT WILL NOT DISSOLVE) AND DRINK 3 TIMES DAILY WITH MEALS   Vitamin D3 1.25 MG (07135 UT) capsule  Child No No   Sig: TAKE 1 CAPSULE BY MOUTH ONE TIME PER WEEK   Patient taking differently: Sundays night   aspirin 81 mg chewable tablet   Yes Yes   Sig: Chew 81 mg daily   atorvastatin (LIPITOR) 40 mg tablet   No No   Sig: TAKE 1 TABLET BY MOUTH DAILY WITH DINNER   calcitriol (ROCALTROL) 0.5 MCG capsule  Child No No   Sig: Take 1 capsule (0.5 mcg total) by mouth 3 (three) times a week   cinacalcet (SENSIPAR) 60 MG tablet  Child No No   Sig: Take 2 tablets (120 mg total) by mouth 3 (three) times a week   divalproex sodium (DEPAKOTE SPRINKLE) 125 MG capsule  Child No No   Sig: Take 2 capsules (250 mg total) by mouth every 12 (twelve) hours   Patient taking differently: Take 125 mg by mouth every 12 (twelve) hours   metoprolol succinate (TOPROL-XL) 25 mg 24 hr tablet   No No   Sig: TAKE 1 TABLET BY MOUTH TWICE A DAY   prasugrel (EFFIENT) tablet   No No   Sig: TAKE 1  TABLET (5 MG TOTAL) BY MOUTH DAILY.   sacubitril-valsartan (Entresto) 24-26 MG TABS   No No   Sig: Take 1 tablet by mouth in the morning   Patient taking differently: Take 1 tablet by mouth daily at bedtime      Facility-Administered Medications: None     Patient has no known allergies.    Objective :  Temp:  [98 °F (36.7 °C)] 98 °F (36.7 °C)  HR:  [55-75] 75  BP: ()/(40-69) 106/69  Resp:  [18] 18  SpO2:  [96 %-100 %] 98 %  O2 Device: None (Room air)    Current Weight:    First Weight:    I/O         12/21 0701 12/22 0700 12/22 0701 12/23 0700 12/23 0701 12/24 0700    P.O.  240 420    Total Intake  240 420    Net  +240 +420                 Physical Exam  Vitals and nursing note reviewed.   HENT:      Head:      Comments: Left large frontal hematoma   Cardiovascular:      Rate and Rhythm: Normal rate and regular rhythm.      Heart sounds: Murmur heard.   Pulmonary:      Effort: Pulmonary effort is normal. No respiratory distress.      Breath sounds: Normal breath sounds.   Musculoskeletal:      Right lower leg: No edema.      Left lower leg: No edema.   Skin:     General: Skin is warm and dry.   Neurological:      General: No focal deficit present.      Mental Status: He is alert.   Psychiatric:         Mood and Affect: Mood normal.         Behavior: Behavior normal.       Medications:    Current Facility-Administered Medications:     acetaminophen (TYLENOL) tablet 975 mg, 975 mg, Oral, Q8H [facility], BRITTANY Pickard, 975 mg at 12/22/24 2134    aspirin chewable tablet 81 mg, 81 mg, Oral, Daily, BRITTANY Pickard, 81 mg at 12/23/24 0958    atorvastatin (LIPITOR) tablet 40 mg, 40 mg, Oral, Daily With Dinner, BRITTANY Pickard, 40 mg at 12/22/24 2245    calcitriol (ROCALTROL) capsule 0.5 mcg, 0.5 mcg, Oral, Once per day on Monday Wednesday Friday, BRITTANY Pickard, 0.5 mcg at 12/23/24 0959    cinacalcet (SENSIPAR) tablet 120 mg, 120 mg, Oral, Once per day on Monday Wednesday Friday, BRITTANY Pickard, 120 mg at  12/23/24 1000    divalproex sodium (DEPAKOTE SPRINKLE) capsule 250 mg, 250 mg, Oral, Q12H [facility], BRITTANY Pickard, 250 mg at 12/23/24 1000    heparin (porcine) subcutaneous injection 5,000 Units, 5,000 Units, Subcutaneous, Q8H [facility], BRITTANY Pickard, 5,000 Units at 12/23/24 0523    menthol-methyl salicylate (BENGAY) 10-15 % cream, , Apply externally, 4x Daily PRN, BRITTANY Pickard    metoprolol succinate (TOPROL-XL) 24 hr tablet 25 mg, 25 mg, Oral, BID, BRITTANY Pickard    midodrine (PROAMATINE) tablet 5 mg, 5 mg, Oral, Demand, BRITTANY Pickard    nitroglycerin (NITROSTAT) SL tablet 0.4 mg, 0.4 mg, Sublingual, Q5 Min PRN, BRITTANY Pickard    prasugrel (EFFIENT) tablet 5 mg, 5 mg, Oral, Daily, BRITTANY Pickard, 5 mg at 12/23/24 0958    sacubitril-valsartan (ENTRESTO) 24-26 MG per tablet 1 tablet, 1 tablet, Oral, Daily, BRITTANY Pickard, 1 tablet at 12/23/24 0959    sevelamer (RENAGEL) tablet 2,400 mg, 2,400 mg, Oral, TID With Meals, BRITTANY Pickard, 2,400 mg at 12/23/24 0958    Current Outpatient Medications:     aspirin 81 mg chewable tablet, Chew 81 mg daily, Disp: , Rfl:     atorvastatin (LIPITOR) 40 mg tablet, TAKE 1 TABLET BY MOUTH DAILY WITH DINNER, Disp: 90 tablet, Rfl: 1    calcitriol (ROCALTROL) 0.5 MCG capsule, Take 1 capsule (0.5 mcg total) by mouth 3 (three) times a week, Disp: 12 capsule, Rfl: 0    cinacalcet (SENSIPAR) 60 MG tablet, Take 2 tablets (120 mg total) by mouth 3 (three) times a week, Disp: 24 tablet, Rfl: 0    divalproex sodium (DEPAKOTE SPRINKLE) 125 MG capsule, Take 2 capsules (250 mg total) by mouth every 12 (twelve) hours (Patient taking differently: Take 125 mg by mouth every 12 (twelve) hours), Disp: 360 capsule, Rfl: 1    metoprolol succinate (TOPROL-XL) 25 mg 24 hr tablet, TAKE 1 TABLET BY MOUTH TWICE A DAY, Disp: 180 tablet, Rfl: 1    MIDODRINE HCL PO, Take 5 mg by mouth PRN w/ dialysis, Disp: , Rfl:     prasugrel (EFFIENT) tablet, TAKE 1 TABLET (5 MG TOTAL) BY MOUTH DAILY., Disp: 90  "tablet, Rfl: 1    sacubitril-valsartan (Entresto) 24-26 MG TABS, Take 1 tablet by mouth in the morning (Patient taking differently: Take 1 tablet by mouth daily at bedtime), Disp: 30 tablet, Rfl: 5    Sevelamer Carbonate 2.4 g PACK, VIGOROUSLY MIX CONTENTS OF 1 PACKET IN WATER (IT WILL NOT DISSOLVE) AND DRINK 3 TIMES DAILY WITH MEALS, Disp: , Rfl:     Vitamin D3 1.25 MG (50310 UT) capsule, TAKE 1 CAPSULE BY MOUTH ONE TIME PER WEEK (Patient taking differently: Sundays night), Disp: 12 capsule, Rfl: 4      Lab Results: I have reviewed the following results:  Results from last 7 days   Lab Units 12/23/24  0522 12/22/24  1856   WBC Thousand/uL 5.47 5.41   HEMOGLOBIN g/dL 9.6* 10.4*   HEMATOCRIT % 30.1* 32.9*   PLATELETS Thousands/uL 138* 134*   POTASSIUM mmol/L 4.9 4.5   CHLORIDE mmol/L 93* 93*   CO2 mmol/L 30 31   BUN mg/dL 40* 36*   CREATININE mg/dL 7.59* 7.13*   CALCIUM mg/dL 7.6* 7.9*   ALBUMIN g/dL  --  4.0       Administrative Statements   Portions of the record may have been created with voice recognition software. Occasional wrong word or \"sound a like\" substitutions may have occurred due to the inherent limitations of voice recognition software. Read the chart carefully and recognize, using context, where substitutions have occurred.If you have any questions, please contact the dictating provider.  "

## 2024-12-23 NOTE — ASSESSMENT & PLAN NOTE
HD Monday Wednesday Friday via left AV fistula  Patient will see if HD today.  Outpatient HD tomorrow and again on Friday per holiday schedule

## 2024-12-30 ENCOUNTER — HOSPITAL ENCOUNTER (EMERGENCY)
Facility: HOSPITAL | Age: 64
Discharge: LEFT BEFORE DISCHARGE | End: 2024-12-30
Payer: MEDICARE

## 2024-12-30 VITALS
DIASTOLIC BLOOD PRESSURE: 45 MMHG | TEMPERATURE: 98.4 F | RESPIRATION RATE: 16 BRPM | OXYGEN SATURATION: 98 % | SYSTOLIC BLOOD PRESSURE: 102 MMHG | HEART RATE: 70 BPM

## 2024-12-30 LAB
ALBUMIN SERPL BCG-MCNC: 3.9 G/DL (ref 3.5–5)
ALP SERPL-CCNC: 133 U/L (ref 34–104)
ALT SERPL W P-5'-P-CCNC: 5 U/L (ref 7–52)
ANION GAP SERPL CALCULATED.3IONS-SCNC: 8 MMOL/L (ref 4–13)
AST SERPL W P-5'-P-CCNC: 4 U/L (ref 13–39)
ATRIAL RATE: 70 BPM
ATRIAL RATE: 76 BPM
BASOPHILS # BLD AUTO: 0.01 THOUSANDS/ΜL (ref 0–0.1)
BASOPHILS NFR BLD AUTO: 0 % (ref 0–1)
BILIRUB SERPL-MCNC: 0.54 MG/DL (ref 0.2–1)
BUN SERPL-MCNC: 18 MG/DL (ref 5–25)
CALCIUM SERPL-MCNC: 8.7 MG/DL (ref 8.4–10.2)
CHLORIDE SERPL-SCNC: 100 MMOL/L (ref 96–108)
CO2 SERPL-SCNC: 31 MMOL/L (ref 21–32)
CREAT SERPL-MCNC: 3.88 MG/DL (ref 0.6–1.3)
EOSINOPHIL # BLD AUTO: 0.06 THOUSAND/ΜL (ref 0–0.61)
EOSINOPHIL NFR BLD AUTO: 1 % (ref 0–6)
ERYTHROCYTE [DISTWIDTH] IN BLOOD BY AUTOMATED COUNT: 15.5 % (ref 11.6–15.1)
GFR SERPL CREATININE-BSD FRML MDRD: 15 ML/MIN/1.73SQ M
GLUCOSE SERPL-MCNC: 122 MG/DL (ref 65–140)
HCT VFR BLD AUTO: 30.5 % (ref 36.5–49.3)
HGB BLD-MCNC: 9.9 G/DL (ref 12–17)
IMM GRANULOCYTES # BLD AUTO: 0.02 THOUSAND/UL (ref 0–0.2)
IMM GRANULOCYTES NFR BLD AUTO: 0 % (ref 0–2)
LYMPHOCYTES # BLD AUTO: 0.69 THOUSANDS/ΜL (ref 0.6–4.47)
LYMPHOCYTES NFR BLD AUTO: 14 % (ref 14–44)
MCH RBC QN AUTO: 31.7 PG (ref 26.8–34.3)
MCHC RBC AUTO-ENTMCNC: 32.5 G/DL (ref 31.4–37.4)
MCV RBC AUTO: 98 FL (ref 82–98)
MONOCYTES # BLD AUTO: 0.47 THOUSAND/ΜL (ref 0.17–1.22)
MONOCYTES NFR BLD AUTO: 9 % (ref 4–12)
NEUTROPHILS # BLD AUTO: 3.81 THOUSANDS/ΜL (ref 1.85–7.62)
NEUTS SEG NFR BLD AUTO: 76 % (ref 43–75)
NRBC BLD AUTO-RTO: 0 /100 WBCS
P AXIS: 66 DEGREES
P AXIS: 78 DEGREES
PLATELET # BLD AUTO: 122 THOUSANDS/UL (ref 149–390)
PMV BLD AUTO: 10.4 FL (ref 8.9–12.7)
POTASSIUM SERPL-SCNC: 3.7 MMOL/L (ref 3.5–5.3)
PR INTERVAL: 212 MS
PR INTERVAL: 218 MS
PROT SERPL-MCNC: 6.9 G/DL (ref 6.4–8.4)
QRS AXIS: 244 DEGREES
QRS AXIS: 260 DEGREES
QRSD INTERVAL: 164 MS
QRSD INTERVAL: 170 MS
QT INTERVAL: 492 MS
QT INTERVAL: 494 MS
QTC INTERVAL: 533 MS
QTC INTERVAL: 553 MS
RBC # BLD AUTO: 3.12 MILLION/UL (ref 3.88–5.62)
SODIUM SERPL-SCNC: 139 MMOL/L (ref 135–147)
T WAVE AXIS: 50 DEGREES
T WAVE AXIS: 58 DEGREES
VENTRICULAR RATE: 70 BPM
VENTRICULAR RATE: 76 BPM
WBC # BLD AUTO: 5.06 THOUSAND/UL (ref 4.31–10.16)

## 2024-12-30 PROCEDURE — 36415 COLL VENOUS BLD VENIPUNCTURE: CPT

## 2024-12-30 PROCEDURE — 85025 COMPLETE CBC W/AUTO DIFF WBC: CPT

## 2024-12-30 PROCEDURE — 80053 COMPREHEN METABOLIC PANEL: CPT

## 2024-12-30 PROCEDURE — 93010 ELECTROCARDIOGRAM REPORT: CPT | Performed by: INTERNAL MEDICINE

## 2024-12-30 PROCEDURE — 93005 ELECTROCARDIOGRAM TRACING: CPT

## 2025-01-02 ENCOUNTER — OFFICE VISIT (OUTPATIENT)
Facility: CLINIC | Age: 65
End: 2025-01-02
Payer: MEDICARE

## 2025-01-02 DIAGNOSIS — R53.81 PHYSICAL DECONDITIONING: ICD-10-CM

## 2025-01-02 DIAGNOSIS — Z86.73 HISTORY OF CVA (CEREBROVASCULAR ACCIDENT): Primary | ICD-10-CM

## 2025-01-02 DIAGNOSIS — R41.89 COGNITIVE DEFICITS: ICD-10-CM

## 2025-01-02 DIAGNOSIS — R26.89 POOR BALANCE: Primary | ICD-10-CM

## 2025-01-02 PROCEDURE — 97168 OT RE-EVAL EST PLAN CARE: CPT

## 2025-01-02 PROCEDURE — 97112 NEUROMUSCULAR REEDUCATION: CPT

## 2025-01-02 NOTE — PROGRESS NOTES
OCCUPATIONAL THERAPY RE-CERTIFICATION    Today's Date: 2025  Patient Name: Asad Galloway  : 1960  MRN: [id number]  Referring Provider: Beatriz Hung PA-C  Dx: History of CVA (cerebrovascular accident) [Z86.73]    SKILLED ANALYSIS:  Pt is a right hand dominant 64 year old male presenting to OP OT after ~1.5 month break 2* recent fall and multiple hospitalizations.  Fall had significant head strike and resulted in concussion a frontal scalp hematoma.  Wife presents with pt to re-eval and notes significant decline in functioning since last OT re-eval.  Based on assessments, pt demonstrating significant decline with overall functional cognition, b/l UE strength, grasp strength, FMC, dexterity, direction following, pinch strength and sustained attn since last re-eval.  Pt continues to demonstrate deficits in the following domains: EF skills, frustration tolerance, emotional regulation, attention, recall,  skills, standing balance, functional activity tolerance, UE strength, hand strength, FMC, dexterity.  Deficits are noted based on the following assessments: MoCA, TM part A, MMT, dynamometer, pinch gauge, Nine-Hole Peg Test, Functional Dexterity Test and clinical observation. These deficits are impairing the patient ability to perform ADLs and IADLs to a level he was at before break from OT.  Prior to the break from services, pt had been making consistent progress towards OT goals.. Recommend OP OT 2x/wk for an additional 12 weeks with focus on aforementioned deficits to maximize functional recovery, improve QOL, and reduce caregiver burden.  Findings and recommendations discussed with pt and pt's wife, and they are in agreement.    Educated pt on charges of insurance, acknowledge understanding, and are in agreement.    PLAN OF CARE START: 25  PLAN OF CARE END: 25  FREQUENCY: 2x/week  PRECAUTIONS dialysis 3x/week; NO FOOD OR WATER CAN BE GIVEN TO PATIENT; scared of dogs; fall risk; emotionally  "labile    Subjective:     11/21/24: Pt states he feels like he has been doing better since coming to therapy.     1/2/25: Pt tearful and reports things have been much harder for him at home since last re-eval    Occupational Profile  Pt lives with at home with his wife and two kids (23 and 28), other two kids are  and live on their own. Pt lives in a bi-level home. There are 5 steps down and 7 steps up. Pt resides downstairs. They have a shower bench, grab bars, handles on toilet, and railing to assist with getting into and out of bed. Pt is a retired , he has been retired since his stroke. Pt is not driving. Pt wife reports she provides ~80% assistance for all ADLs. Pt wife reports she does all IADLs. Pt was not independent before his recent fall, however he is significantly more dependent at this point.     PATIENT GOAL: \"To get better, to get stronger\"    HISTORY OF PRESENT ILLNESS:   Asad Galloway is a 64 y.o. male patient who originally presented to the hospital on 7/17/2024 due to ambulatory dysfunction after fall.  Patient was admitted to [facility] was discharged with a diagnosis of acute DVT on 07/16.  Patient presented to the emergency department on 07/17 after a mechanical fall at home.  Fall was witnessed.  Patient did not syncopized.  Patient's trauma evaluation was negative in the emergency department.  Patient was noted to have profound weakness with attempts to lift himself off of the toilet.  PT/OT recommended short-term rehab.  Today, wife at bedside is declining short-term rehab.  Family would like to be discharged with outpatient physical therapy/Occupational Therapy.      PMH:   Past Medical History:   Diagnosis Date    Cerebrovascular accident (CVA) due to thrombosis of left middle cerebral artery (HCC) 07/29/2018    Chronic kidney disease     Coronary artery disease     Diabetes mellitus (HCC)     not on meds    Dialysis patient (HCC)     M-W-F    Fistula     left " upper arm for hemodialyis    GERD (gastroesophageal reflux disease)     History of coronary artery stent placement     x3    Hypercholesteremia     Hyperlipidemia     Hypertension     Infectious viral hepatitis     B as child    Limb alert care status     no BP/IV left arm    Neuropathy     Nonhealing ulcer of heel (HCC) 04/25/2023    Obesity     Osteomyelitis (HCC)     last assessed 11/4/16-per son not currently    Penetrating foot wound, left, initial encounter 01/19/2022    PVC's (premature ventricular contractions)     sees [facility]    Stroke ([facility])     last weeof July 2018 [facility]    TIA (transient ischemic attack) 10/28/2018    Ulcer of left heel, limited to breakdown of skin ([facility]) 06/13/2024    Wears dentures     Wears glasses        Past Surgical Hx:   Past Surgical History:   Procedure Laterality Date    ABDOMINAL SURGERY      CARDIAC CATHETERIZATION N/A 05/02/2022    Procedure: Cardiac Coronary Angiogram;  Surgeon: Sam Davis MD;  [facility];  Service: Cardiology    CARDIAC CATHETERIZATION N/A 05/02/2022    Procedure: Cardiac pci;  Surgeon: Sam Davis MD;  [facility];  Service: Cardiology    CARDIAC CATHETERIZATION  02/01/2023    Procedure: Cardiac catheterization;  Surgeon: Sam Davis MD;  [facility];  Service: Cardiology    CARDIAC CATHETERIZATION N/A 02/01/2023    Procedure: Cardiac pci;  Surgeon: Sam Davis MD;  [facility];  Service: Cardiology    CARDIAC CATHETERIZATION N/A 02/01/2023    Procedure: Cardiac Coronary Angiogram;  Surgeon: Sam Davis MD;  [facility];  Service: Cardiology    CARDIAC CATHETERIZATION N/A 02/01/2023    Procedure: Cardiac other-IVUS;  Surgeon: Sam Davis MD;  [facility];  Service: Cardiology    CHOLECYSTECTOMY      Percutaneous    COLONOSCOPY      CYSTOSCOPY      HEMODIALYSIS ADULT  1/22/2024    HEMODIALYSIS ADULT  1/24/2024    IR  "LOWER EXTREMITY ANGIOGRAM  9/29/2023    IR LOWER EXTREMITY ANGIOGRAM  2/26/2024    OTHER SURGICAL HISTORY      \"stimulator to control bowel movements\"    FL ESOPHAGOGASTRODUODENOSCOPY TRANSORAL DIAGNOSTIC N/A 09/27/2016    Procedure: ESOPHAGOGASTRODUODENOSCOPY (EGD);  Surgeon: Adele Rowe MD;  Location: [facility];  Service: Gastroenterology    FL LAPAROSCOPY SURG CHOLECYSTECTOMY N/A 02/29/2016    Procedure: LAPAROSCOPIC CHOLECYSTECTOMY ;  Surgeon: Cliff Roman DO;  Location: [facility];  Service: General    FL SLCTV CATHJ 3RD+ ORD SLCTV ABDL PEL/LXTR BRNCH Left 9/29/2023    Procedure: Left leg angiogram with intervention;  Surgeon: Michelle Galvan MD;  Location: [facility];  Service: Vascular    FL SLCTV CATHJ 3RD+ ORD SLCTV ABDL PEL/LXTR BRNCH Left 2/26/2024    Procedure: Left leg angiogram antegrade access, left popliteal angioplasty;  Surgeon: Michelle Galvan MD;  Location[facility];  Service: Vascular    ROTATOR CUFF REPAIR Right     SPINAL CORD STIMULATOR IMPLANT      \"Medtronic interstim model # 3058- in lower back to control bowel movements-currently turned off-battery is dead\"    TOE AMPUTATION Right 10/28/2016    Procedure: 3RD TOE AMPUTATION ;  Surgeon: Anjel Salas DPM;  Location[facility];  Service:         Pain: FLACC 0    Objective    Upper Extremities:  Pt is right hand dominant                MARIFER: RUE: 42lb (previously 49lb) LUE: 19lb (19 lbs)  The age norm is approximately 76-89 lbs, indicating decreased  strength.                2 point pinch: RUE: 6.5lb (previously 8), LUE: 1.5lb (previously 2lb)  3 point pinch: RUE: 5.5lb (previously 10), LUE: 1.5lb (previously 2lb)  Lateral pinch: RUE: 11.5lb (previously 12), LUE: 3lb (previously 3lb)                Range of Motion: not formally measured 1/2, but noted to be WFL in ROM screen  AROM:   R UE   - Shoulder flexion: 108  - Shoulder scaption/abduction:126  - Shoulder extension:   - Shoulder internal rotation: WNL  - " Shoulder external rotation:32  - Elbow flexion: WNL  - Elbow extension: WNL  - Wrist flexion:WNL  - Wrist extension: WNL  - Pronation: WNL  - Supination: WNL  - Radial deviation: WNL  - Ulnar deviation: WNL  - Finger extension: WNL   - Finger flexion: WNL    L UE: WFL     Subluxation: none    Scapular winging: none    Spasticity: none     Finger to Nose Testing with Visual Occlusion (proprioception):  WNL    Finger to Nose Testing without Visual Occlusion: WNL     Monofilaments/sensory testing: WNL    Functional Cognition:  Highest level of education: High school    Víctor Cognitive Assessment Version 8.1 (MoCA V8.1)  Visuospatial/executive functionin/5  Namin/3  Memory: 1st trial:  3/5, 2nd trial:    Attention/concentration: 2/2  List of letters:   Serial Seven Subtraction:  1/3 w/ 3 error   Language/sentence repetition:  2/2  Language Fluency:  0/1, 5 words with encouragement throughout  Abstract/Correlational Thinkin/2  Delayed Recall:  05  Orientation:  6. Oriented to month, day, date (1 day off), place (with environmental cues), city.  Reports    Memory Index Score: 3/15     Raw Score:  12+1= 13/30, MIS:  3/15, indicative of MODERATE neurocognitive impairments.  Last re-eval pt scored 16/30.    MoCA Scoring        Normal: 26+         Mild Cognitive Impairment: 18-25          Moderate Cognitive Impairment: 10-17         Severe Cognitive Impairment: <10      NINE-HOLE PEG TEST: assesses dexterity/fine motor coordination. Alternative scoring: the number of pegs placed in 50 or 100 seconds can be recorded.  Pt unable to complete with max cues L UE.  Drops multiple, use of b/l hands despite cues.  Discontinued.  Previously:  R hand: 127 seconds (Prior: 138 seconds).   L hand: Completed 6/9 pegs in 3 minutes then terminated due to difficulty and patient frustration, pt also compensated with R hand multiple times.  (Prior: 180 seconds with 8 pegs dropped 6x compensatory use of R UE)      Norms:   Sex Age Average R Average L   Male 61-65 20.87 21.60      Pt demonstrates decreased FMC compared to norms for age/sex      MANUAL MUSCLE TESTING:  R: 4/5 grossly  L:   Shoulder flexion: 4/5  Shoulder extension: 4/5  Elbow flexion: 4/5  Elbow extension: 4-/5  Wrist not assessed today 2* multiple scabbed over wounds in that area    FUNCTIONAL DEXTERITY TEST:  R: 2:00 with 16/16 compensatory use of board/L  UE, 2 pegs dropped.  Requires mod cues throughout to discontinue use of L UE.  L: 2:38 with 16/16 compensatory use of board/R UE, 3 pegs dropped.  Requires mod cues throughout to discontinue use R UE.     [place] making Part A and Part B:   Part A: initially required maxA to complete 1-4, but then able to continue with 3 cues.  Completes in 2:38 (last re-eval 1i:28 with 1 mistake corrected by the therapist (Prior: 2:07 with 2 VCs).  Part B: not assessed 2* not being able to complete smaller version on MoCA (previously 7:56 with 4 cues to locate items, 3 cues for direction following, 6 cues for sequencing)    Indicating deficits: Part A deficit > 33.22 seconds for age related norms    GOALS:   Short Term Goals:  Pt will increase B UE  strength by 4 lbs to improve performance with ADLs Progressing previously, not declined  Pt will increase R lateral pinch strength by 2 lb to improve performance with ADLs/dressing ACHIEVED previously, now declined  Pt will demonstrate improved functional cognition as evidenced by improving score on the MoCA to 10/30 to improve performance during ADLs and IADLs ACHIEVED 9/12  Pt will demonstrate improved sustained attention as evidenced by completing TM part A within 1 minute with 0 verbal cues. Progressing previously, now declined  Pt will demonstrate improved FMC as evidenced by improving time on 9-hole peg test by 8 seconds with each hand. DECLINED    Long Term Goals: 8-12 weeks  Pt will increase B UE  strength by 7 lbs to improve performance with ADLs   Pt will  increase R lateral pinch strength by 3 lb to improve performance with ADLs/dressing  Pt will demonstrate improved functional cognition as evidenced by improving score on the MoCA to 12/30 to improve performance during ADLs and IADLs ACHIEVED 9/12  Pt will improve functional activity tolerance and dressing to complete with no more than mod(A) from his wife per report. Progressing previously, now declined  Pt will improve functional activity tolerance and bathroom skills to complete with no more than mod(A) from his wife per report. Progressing previously, now declined    Goals added 10/15:  Pt will demonstrate improved overall cognition by scoring at least 17/30 on MoCA for carry over with daily activities. Progressing previously, now declined    OTHER PLANNED THERAPY INTERVENTIONS:   Supine, seated, and in stance neuro re-ed  Tricep AG  NMES/FES  FMC/prehension  Timed Trials  Manual tx  Hand to target  Sensory re-ed  Seated functional reach: crossing midline  Supine place and hold  WBearing strategies   Closed chain activities  Open chain activities  Internal and external memory aides  Multimatrix for saccades/ visual clutter/attention  Hypersensitivity strategies education  Multi-modal environment  Sustained/alternating/divided attention  Tracking tube  Oculomotor control:  saccades, con/divergence  Conv./div. Dynamic tasks  Work stations with timed transitions  Temporal Awareness  Memory and mental manipulation  Auditory processing with immediate recall  Memory retention with immediate and delayed recall  Edu on cog/vision apps      Objective Measurement Tracker:    IE (8/13/24) 1st Re-eval: () 2nd Re-eval: ( 3rd Re-eval: ()    MoCA 8/30 13/30      TM Test A        TM Test B                Nine Hole Peg Test R: 112  L: 78 R: 138  L: N/A      Functional Dexterity Test         Strength R: 50lb, L: 20lb R: 50; L: 20      Lateral Pinch Strength R: 11lb, L: 4lb R: 10lb, L: 4lb      2 Point Pinch Strength R: 5lb, L:  1lb  R: 8lb, L: 1lb         3 Point Pinch Strength R: 6lb, L: 1lb  R: 10lb, L: 1lb

## 2025-01-02 NOTE — PROGRESS NOTES
PT Re-Evaluation          POC expires Unit limit Auth Expiration date PT/OT + Visit Limit? Co-Insurance   24   Bomn primary, 30pcy secondary Yes                                            Today's date: 2025  Patient name: Asad Galloway  : 1960  MRN: [id number]  Referring provider: Raj Auguste DO  Dx:   Encounter Diagnosis     ICD-10-CM    1. Poor balance  R26.89       2. Physical deconditioning  R53.81                 Assessment  Assessment details: Patient is a 64 y.o. male who presents to skilled PT for impaired balance, general deconditioning with multiple comorbidiites including hx of CVA, CKD on HD. Patient has been participating in skilled PT tx at this clinic  Since ; however, he has missed 1 month due to multiple hospitalizations including one s/p fall resulting in facial bruising, no fx. He returns today and  since his last re-assessment patient has showed decline in function as evidenced by decreased scores on all outcome measures including 5xSTS, TUG, TUG Cog, 10MWT, BURGER, and 6MWT. Patient only able to tolerate 2.5 minutes of 6MWT due to impaired CV endurance. Due to low scores on BURGER and frequent LOB throughout re-evaluation; discussed with wife and pt on PT recommendation for him using RW for mobility instead of cane at this time.   He is categorizes as a Limited Community Ambulator, per APTA and Rehab Measures Cutoff scores. Gait abnormalities continue to demonstrate M/L instability, decreased propulsion, stance time and swing. Patient also had difficulty performing safe transfers without assistance and cueing today.  . He has not met any goals at this time, and discussed importance of maintaining compliance with PT and OT treatments. Patient will continue to benefit from skilled PT to address noted impairments and functional limitations they are causing with overall goal to return patient to [facility]  level possible with reduced risk for falls.         Please contact me if you have any questions or recommendations. Thank you for the referral and the opportunity to share in Asad Galloway's care.      Cut off score   All date taken from APTA Neuro Section or Rehab Measures    BURGER test: 46/56                                              5 x STS Test:  MDC: 6 points                                                  MDC: 2.3 seconds   age norms                                                                 Age Norms   60-69 year old = M: 55, F: 55                        60-69 year old: 11.4 seconds   70-79 year old = M 54,  F: 53                       70-79 year old: 12.6 seconds     80-89 year old = M53,   F: 50                       80-89 year old: 14.8 seconds      TUG test:                                                                     10 Meter Walk Test:  MDC: 4.14 seconds       MDC: .59 ft/sec  Cut off score for Falls                                                  Age Norms  > 13.5 seconds community dwelling adults                20-29; M: 4.56 ft/sec F: 4.62 ft/sec  > 32.2 Frail Elderly                                                     30-39: M 4.76 ft/sec  F: 4.68 ft/sec          40-49: M: 4.79 ft/sec  F: 4.62 ft/sec  6 Minute Walk Test      50-59: M: 4.76 ft/sec  F: 4.56 ft/sec  MDC: 190 feet       60-69: M: 4.56 ft/sec  F: 4.26 ft/sec  Age Norms       70-+    M: 4.36 ft/sec  F: 4.16 ft/sec  60-69:    M: 1876 F: 1765  70-79:    M: 1729 F: 1545    FGA:  80-89 +: M: 1368 F; 1286       MCID: 4 points            Geriatrics/ Community Dwelling Older Adults: </= 22/30 fall risk            Geriatrics/ Community Dwelling Older Adults: </= 20/30 unexplained falls in the next 6 months            Parkinsons: </= 18/30 fall risk      Patient verbalized understanding of POC          Impairments: Abnormal coordination, Abnormal gait, Abnormal muscle tone, Abnormal or restricted ROM, Activity intolerance,  Impaired balance, Impaired physical strength, Lacks appropriate HEP, Poor posture, Poor body mechanics, Pain with function, Safety issue, Weight-bearing intolerance, Abnormal movement, Difficulty understanding, Abnormal muscle firing  Understanding of Dx/Px/POC: Excellent  Prognosis: Excellent    Patient verbalized understanding of POC.         Please contact me if you have any questions or recommendations. Thank you for the referral and the opportunity to share in Asad Galloway's care.        Plan  Plan details: complete testing, balance and functional strength/endurance re-training  Patient would benefit from: PT Eval and Skilled PT  Planned modality interventions: Biofeedback, Cryotherapy, TENS, Thermotherapy  Planned therapy interventions: Abdominal trunk stabilization, ADL training, Balance, Balance/WB training, Breathing training, Body mechanics training, Coordination, Functional ROM exercises, Gait training, HEP, Joint Mobilization, Manual Therapy, Griggs taping, Motor coordination training, Neuromuscular re-education, Patient education, Postural training, Strengthening, Stretching, Therapeutic activities, Therapeutic exercises, Therapeutic training, Transfer training, Activity modification, Work reintegration  Frequency: 2x/wk  Duration in weeks: 12  Plan of Care beginning date: 11/7/24  Plan of Care expiration date: 12 weeks from RE 1/2/25 - 3/27/25  Treatment plan discussed with: Patient       Goals  Short Term Goals (4 weeks):    - Patient will improve time on TUG by 2.9 seconds to facilitate improved safety in all ambulation MET  - Patient will be independent in basic HEP 2-3 weeks  - Patient will improve 5xSTS score by 2.3 seconds to promote improved LE functional strength needed for ADLs- NM       Long Term Goals (12 weeks):  - Patient will be independent in a comprehensive home exercise program- NM   - Patient will improve scoring on DGI by 2.6 points to progress safety-NA  - Patient will improve  gait speed by 0.18 m/s to improve safety with community ambulation-NM  - Patient will improve BURGER by 6 points in order to improve static balance and reduce risk for falls-NM  - Patient will improve scoring on FGA by 4 points to progress safety with dynamic tasks-NA  - Patient will be able to demonstrate HT in gait without veering-NM  - Patient will improve 6 Minute Walk Test score by 190 feet to promote improved cardiovascular endurance-NM  - Patient will report 50% reduction in near falls in order to improve safety with functional tasks and reduce his risk for falls-ONGOING  - Patient will report going on walks at least 3 days per week to promote independence and improved cardiovascular endurance-NM  - Patient will be able to ascend/descend stairs reciprocally with 1 UE assist to promote independence and safety with ADLs-NM  - Patient will report 50% reduction in near falls when ambulating on uneven terrain-NM       Cut off score    All date taken from APTA Neuro Section or Rehab Measures      Burger/56  MDC: 6 pts  Age Norms:  60-69: M - 55   F - 55  70-79: M - 54   F - 53  80-89: M - 53   F - 50 5xSTS: June et al 2010  MDC: 2.3 sec  Age Norms:  60-69: 11.4 sec  70-79: 12.6 sec  80-89: 14.8 sec   TUG  MDC: 4.14 sec  Cut off score:  >13.5 sec community dwelling adults  >32.2 frail elderly  <20 I for basic transfers  >30 dependent on transfers 10 Meter Walk Test: Zane al 2011  MDC: 0.18 m/s  20-29: M - 1.35 m   F - 1.34 m  30-39: M - 1.43 m   F - 1.34 m  40-49: M - 1.43 m   F - 1.39 m  50-59: M - 1.43 m   F - 1.31 m  60-69: M - 1.34 m   F - 1.24 m  70-79: M - 1.26 m   F - 1.13 m  80-89: M - 0.97 m   F - 0.94 m    Household Ambulator < 0.4 m/s  Limited Community Ambulator 0.4 - 0.8 m/s  Community Ambulator 0.8 - 1.2 m/s  Safely cross the street > 1.2 m/s   FGA  MCID: 4 pts  Geriatrics/community < 22/30 fall risk  Geriatrics/community < 20/30 unexplained falls    DGI  MDC: vestibular - 4 pts  MDC:  geriatric/community - 3 pts  Falls risk <19/24 mCTSIB  Norm: 20-60 yrs  Eyes open firm: norm sway 0.21-0.48  Eyes closed firm: norm sway 0.48-0.99  Eyes open foam: norm sway 0.38-0.71  Eyes closed foam: norm sway 0.70-2.22   6 Minute Walk Test  MDC: 190.98 ft  MCID: 164 ft    Age Norms  60-69: M - 1876 ft (571.80 m)  F - 1765 ft (537.98 m)  70-79: M - 1729 ft (527.00 m)  F - 1545 ft (470.92 m)  80-89: M - 1368 ft (416.97 m)  F - 1286 ft (391.97 m) ABC: Smith & Tre, 2003  <67% increased risk for falls   Machias-Laury Monofilaments  Evaluator Size:        Force (grams):          Hand/Dorsal Thresholds:        Plantar Thresholds:  - 1.65                       - 0.008                       - Normal                                 - Normal  - 2.36                       - 0.02                         - Normal                                 - Normal  - 2.44                       - 0.04                         - Normal                                 - Normal  - 2.83                       - 0.07                         - Normal                                 - Normal  - 3.22                       - 0.16                         - Diminished light touch          - Normal  - 3.61                       - 0.40                         - Diminished light touch          - Normal  - 3.84                       - 0.60                         - Diminished protective           - Diminished light touch  - 4.08                       - 1.00                         - Diminished protective           - Diminished light touch  - 4.17                       - 1.40                         - Diminished protective           - Diminished light touch  - 4.31                       - 2.00                         - Diminished protective           - Diminished light touch  - 4.56                       - 4.00                         - Loss of protective sense      - Diminished protective  - 4.74                       - 6.00                          - Loss of protective sense      - Diminished protective  - 4.93                       - 8.00                         - Loss of protective sense      - Diminished protective  - 5.07                       - 10.0                         - Loss of protective sense     - Loss of protective sense  - 5.18                       - 15.0                         - Loss of protective sense     - Loss of protective sense  - 5.46                       - 26.0                         - Loss of protective sense     - Loss of protective sense  - 5.88                       - 60.0                         - Loss of protective sense     - Loss of protective sense  - 6.10                       - 100                          - Loss of protective sense     - Loss of protective sense  - 6.45                       - 180                          - Loss of protective sense     - Loss of protective sense  - 6.65                       - 300                          - Deep pressure sense only  - Deep pressure sense only         Subjective    History of Present Illness  - Mechanism of injury: Patient was being tx at [facility] OP location; transitioned to [place] for neuro/balance PT and neuro OT. He has CKD on HD M, W, Fx 5 years ; recent visit to ED 8/23 fistula bleeding but resolved. He  has a past medical history of Cerebrovascular accident (CVA) due to thrombosis of left middle cerebral artery (HCC) (07/29/2018), Chronic kidney disease, Coronary artery disease, Diabetes mellitus (HCC), Dialysis patient (HCC), Fistula, GERD (gastroesophageal reflux disease), History of coronary artery stent placement, Hypercholesteremia, Hyperlipidemia, Hypertension, Infectious viral hepatitis, Limb alert care status, Neuropathy, Obesity, Osteomyelitis (HCC), PVC's (premature ventricular contractions), Stroke (HCC), TIA (transient ischemic attack) (10/28/2018). He reports impaired balance and increased reliance on family for assist with ADLs; he wants to  "improve balance and independence    Update 10/10/24: Patient reports balance is improving with PT. He feels stronger    Updated 24: Patient reports that therapy has been \"beautiful.\" \"Everything has gotten better.\"     Update 25: Patient has had several hospitalizations over the last month. 12/15: fall with no acute injuries; : chest pain, : dizziness, : myalgia.  No acute findings during hospitalizations; reports residual soreness from fall and has noticeable bruising on right side of face. Patient walks into clinic today with wife and SPC, states \"I haven't been good\"    - Primary AD: cane intermittently   - Assist level at home: family assists him with ADLs  - Decreased fine motor tasks: No    Patient goal:  \"I want everything better \"     Pain 25  - Current pain ratin/10  - At best pain ratin/10  - At worst pain ratin/10  - Location: left sided face/head (area of bruising from fall)  - Aggravating factors: unsure     Social Support  - Steps to enter house: 3  - Stairs in house: yes but bedroom on 1st floor    - Lives in: multi story house   - Lives with: wife and 2 kids     - Employment status: retired   - Hand dominance: right    Treatments  - Previous treatment: PT/OT  - Current treatment: PT/OT        Objective     UE SCreen    LE MMT  - R Hip Flexion: 3+/5  L Hip Flexion: 4-/5  - R Hip Abduction: 4-/5  L Hip Abduction: 4-/5  - R Hip Adduction: 4-/5  L Hip Adduction: 4-/5  - R Knee Extension: 4-/5  L Knee Extension: 4-/5  - R Ankle DF: 4/5   L Ankle DF: 4/5  - R Ankle PF: 4-/5   L Ankle PF: 4/5    Sensation  - Light touch: intact       Coordination  - Heel to Shin: impaired B/L  - Alternate Toe Taps: impaired B/L          Postural Screen  - Observation: forward head        Gait  - Abnormalities: ambulates without AD on eval, demos narrow JOSE DAVID, inconsistent step length, lateral instability         Outcome Measures Initial Eval  24 Re-eval  10/10/24 Re eval   24 " RE  1/2/25     5xSTS 15.85 sec, 2 UE  15.41 sec, 2 UE  21.06 sec, pushing off thighs  73.40 sec, 2 UE pushoff from chair     TUG  - Regular  - Cognitive   17.3 sec, no AD  24.3 sec, (serial 3s)   12.59 sec, no AD  18.56 sec, no AD (serial 3s)   16. 44 sec, pushing off thighs  19.74 sec, serial 3s   35.79 sec with cane and CGA  50.74 sec with cane and min A (serial 3s)      10 meter   1.18 m/s without AD   0.86 m/s with cane   0.71 m/s wo cane    0.37 m/s with RW     BURGER 30/56 33/56 34/56 19/56     mCTSIB  - FTEO (firm)  - FTEC (firm)  - FTEO (foam)  - FTEC (foam)   30 sec +  30 ++  2 sec  0 sec   30 sec+  30 sec+  3 sec  0 sec   30 sec+  30 sec+  3 sec   0 sec  Defer today     6MWT 100 ft (attempted but only able to complete 1 minute) 275 ft in 4 minutes  212 ft in 2 minutes w no cane  110 ft in 2.5 minutes with RW     ABC 47.5% 81.88% 72.5% Defer      Vitals    BP: 105/60 mmHg   SpO2: 98%  HR 81bpm                Precautions: falls   Past Medical History:   Diagnosis Date    Cerebrovascular accident (CVA) due to thrombosis of left middle cerebral artery (HCC) 07/29/2018    Chronic kidney disease     Coronary artery disease     Diabetes mellitus (HCC)     not on meds    Dialysis patient (HCC)     M-W-F    Fistula     left upper arm for hemodialyis    GERD (gastroesophageal reflux disease)     History of coronary artery stent placement     x3    Hypercholesteremia     Hyperlipidemia     Hypertension     Infectious viral hepatitis     B as child    Limb alert care status     no BP/IV left arm    Neuropathy     Nonhealing ulcer of heel (HCC) 04/25/2023    Obesity     Osteomyelitis (HCC)     last assessed 11/4/16-per son not currently    Penetrating foot wound, left, initial encounter 01/19/2022    PVC's (premature ventricular contractions)     sees [facility]    Stroke (HCC)     last weeof July 2018 [facility]    TIA (transient ischemic attack) 10/28/2018    Ulcer of left heel, limited to breakdown of  skin ([facility]) 06/13/2024    Wears dentures     Wears glasses

## 2025-01-07 ENCOUNTER — OFFICE VISIT (OUTPATIENT)
Facility: CLINIC | Age: 65
End: 2025-01-07
Payer: MEDICARE

## 2025-01-07 DIAGNOSIS — R53.81 PHYSICAL DECONDITIONING: ICD-10-CM

## 2025-01-07 DIAGNOSIS — Z86.73 HISTORY OF CVA (CEREBROVASCULAR ACCIDENT): Primary | ICD-10-CM

## 2025-01-07 DIAGNOSIS — R26.89 POOR BALANCE: Primary | ICD-10-CM

## 2025-01-07 DIAGNOSIS — R41.89 COGNITIVE DEFICITS: ICD-10-CM

## 2025-01-07 PROCEDURE — 97530 THERAPEUTIC ACTIVITIES: CPT

## 2025-01-07 NOTE — PROGRESS NOTES
"Pt presents to OT s/p PT session during which he was noncompliant and nonparticipatory.  Discussed with pt's wife, who reports pt is being seen by home therapy starting in 1 week, and does not currently want to continue OP services.  \"He's not going to get better\".  Discussed recommendation for possible HHA to decrease caregiver burnout and plan to return to OP if pt progresses to a point that it is feasible for them to get here and for pt to participate appropriately.  D/c episode at this time        Daily Note     Today's date: 2025  Patient name: Asad Galloway  : 1960  MRN: [id number]  Referring provider: Beatriz Hung PA-C  Dx:   Encounter Diagnoses   Name Primary?    History of CVA (cerebrovascular accident) Yes    Cognitive deficits                     PLAN OF CARE START: 24  PLAN OF CARE END: 24  FREQUENCY: 2-3x/week  PRECAUTIONS dialysis 3x/week, falls, terrified of dogs (do not let him see [employer] at all), primary language is Thai but fluent in English; DONT GIVE ANY FLUIDS OR FOODS    Subjective:   Objective: See treatment below.      TA/NMR:     Assessment: Pt tolerated session well. Performed in semi modal environment without wife.  Pt benefits from encouragement, but otherwise not behavioral  Pt is to continue to benefit from continued OT to maximize functional performance and QOL s/p CVA.    Plan: Continued skilled OT per POC.    New Goals added :   Pt will demonstrate improved sustained attention as evidenced by completing TM part A within 1 minute with 0 verbal cues.   Pt will demonstrate improved FMC as evidenced by improving time on 9-hole peg test by 8 seconds with each hand.     Goals added :  Pt will demonstrate improved R sided peripersonal attention by locating items on b/l sides of table with minimally inc time for items on R>L and no cues.      "
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
## 2025-01-09 ENCOUNTER — APPOINTMENT (OUTPATIENT)
Facility: CLINIC | Age: 65
End: 2025-01-09
Payer: MEDICARE

## 2025-01-11 ENCOUNTER — TELEPHONE (OUTPATIENT)
Dept: OTHER | Facility: OTHER | Age: 65
End: 2025-01-11

## 2025-01-11 NOTE — TELEPHONE ENCOUNTER
"Pt stated, \"I was in the ED and was told I have a blood clot and to make an appointment for Monday with my Hematology Oncology provider. I am on dialysis and blood thinners. I don't mind waiting until Monday to schedule a f/u appointment.\"    Please call pt when office reopens.       "

## 2025-01-13 NOTE — TELEPHONE ENCOUNTER
Patient was seen in ED at [facility]...was sent home on [facility]...existing 1/16/2025 appt is fine

## 2025-01-13 NOTE — TELEPHONE ENCOUNTER
Patient I mentioned earlier. Chronic DVT on distal vein on RLE, on Eliquis, saw [facility] in November.  Priority appointment? Please LMK, will schedule. Thanks!

## 2025-01-14 ENCOUNTER — OFFICE VISIT (OUTPATIENT)
Dept: CARDIOLOGY CLINIC | Facility: CLINIC | Age: 65
End: 2025-01-14
Payer: MEDICARE

## 2025-01-14 ENCOUNTER — APPOINTMENT (OUTPATIENT)
Facility: CLINIC | Age: 65
End: 2025-01-14
Payer: MEDICARE

## 2025-01-14 VITALS
HEART RATE: 75 BPM | DIASTOLIC BLOOD PRESSURE: 52 MMHG | WEIGHT: 209 LBS | OXYGEN SATURATION: 100 % | BODY MASS INDEX: 30.96 KG/M2 | HEIGHT: 69 IN | SYSTOLIC BLOOD PRESSURE: 118 MMHG

## 2025-01-14 DIAGNOSIS — I82.5Z1 CHRONIC DEEP VEIN THROMBOSIS (DVT) OF DISTAL VEIN OF RIGHT LOWER EXTREMITY (HCC): ICD-10-CM

## 2025-01-14 DIAGNOSIS — N18.6 ESRD ON DIALYSIS (HCC): ICD-10-CM

## 2025-01-14 DIAGNOSIS — Z99.2 ESRD ON DIALYSIS (HCC): ICD-10-CM

## 2025-01-14 DIAGNOSIS — Z95.5 STATUS POST INSERTION OF DRUG ELUTING CORONARY ARTERY STENT: ICD-10-CM

## 2025-01-14 DIAGNOSIS — I10 PRIMARY HYPERTENSION: ICD-10-CM

## 2025-01-14 DIAGNOSIS — Z99.2 TYPE 2 DIABETES MELLITUS WITH CHRONIC KIDNEY DISEASE ON CHRONIC DIALYSIS, WITHOUT LONG-TERM CURRENT USE OF INSULIN (HCC): ICD-10-CM

## 2025-01-14 DIAGNOSIS — Z79.02 ANTIPLATELET OR ANTITHROMBOTIC LONG-TERM USE: ICD-10-CM

## 2025-01-14 DIAGNOSIS — E11.22 TYPE 2 DIABETES MELLITUS WITH CHRONIC KIDNEY DISEASE ON CHRONIC DIALYSIS, WITHOUT LONG-TERM CURRENT USE OF INSULIN (HCC): ICD-10-CM

## 2025-01-14 DIAGNOSIS — I25.5 ISCHEMIC CARDIOMYOPATHY: ICD-10-CM

## 2025-01-14 DIAGNOSIS — E78.2 MIXED HYPERLIPIDEMIA: ICD-10-CM

## 2025-01-14 DIAGNOSIS — Z76.82 PRE-KIDNEY TRANSPLANT, LISTED: ICD-10-CM

## 2025-01-14 DIAGNOSIS — N18.6 TYPE 2 DIABETES MELLITUS WITH CHRONIC KIDNEY DISEASE ON CHRONIC DIALYSIS, WITHOUT LONG-TERM CURRENT USE OF INSULIN (HCC): ICD-10-CM

## 2025-01-14 DIAGNOSIS — I25.10 CAD, MULTIPLE VESSEL: Primary | ICD-10-CM

## 2025-01-14 DIAGNOSIS — I50.22 CHRONIC SYSTOLIC HEART FAILURE (HCC): ICD-10-CM

## 2025-01-14 DIAGNOSIS — I35.0 NONRHEUMATIC AORTIC VALVE STENOSIS: ICD-10-CM

## 2025-01-14 PROCEDURE — 99214 OFFICE O/P EST MOD 30 MIN: CPT | Performed by: INTERNAL MEDICINE

## 2025-01-14 NOTE — PATIENT INSTRUCTIONS
You were seen today in the Cardiology office for follow up evaluation.     Below is a summary of the recommendations based upon today's visit:    1. Dietary modifications - Follow a Mediterranean diet - one that is low in saturated fats (avoid red meat, dairy, cheeses), emphasize chicken, fish, turkey, tofu, vegetables, fruit (moderate quantities); also avoid simple carbs/starch/sugars, use whole wheat/grain/bran/oatmeal, snack on small quantities of nuts and fruits.     2. Exercise is very important. Ideally, AT LEAST four to five times weekly for 30 to 60 minutes per session - both cardiovascular and resistance work is OK. Listen to your body and rest when needed.    3. Continue all present medications.    4. Testing prior to your next visit includes: blood work.    5. Remember the ABCDEs to cardiovascular health for patients:  A: Awareness of cardiovascular symptoms  B: Blood pressure control  C: Cholesterol control  C: Cigarette avoidance  D: Diabetes control  D: Dietary choices  E: Exercise    6. Return in 4 weeks    Any testing ordered in office today will be available for patient review via [employer], and reviewed with me at the follow-up office visit as scheduled.    Thank you for choosing [facility].    Please call our office or use [employer] with any questions.

## 2025-01-14 NOTE — PROGRESS NOTES
[facility]    Name:Asad Galloway   DOS: 1/14/2025     Chief Complaint:   Chief Complaint   Patient presents with    Follow-up     DVT, started eliquis. No current cardiac complaints        HISTORY OF PRESENT ILLNESS:    HPI:  Asad Galloway is a 64 y.o. male. He  has a past medical history of Cerebrovascular accident (CVA) due to thrombosis of left middle cerebral artery (HCC) (07/29/2018), Chronic kidney disease, Coronary artery disease, Diabetes mellitus ([facility]), Dialysis patient ([facility]), Fistula, GERD (gastroesophageal reflux disease), History of coronary artery stent placement, Hypercholesteremia, Hyperlipidemia, Hypertension, Infectious viral hepatitis, Limb alert care status, Neuropathy, Nonhealing ulcer of heel ([facility]) (04/25/2023), Obesity, Osteomyelitis ([facility]), Penetrating foot wound, left, initial encounter (01/19/2022), PVC's (premature ventricular contractions), Stroke ([facility]), TIA (transient ischemic attack) (10/28/2018), Ulcer of left heel, limited to breakdown of skin ([facility]) (06/13/2024), Wears dentures, and Wears glasses.     Active issues:  Ischemic cardiomyopathy  Chronic HF with mid-range EF (partially recovered to 45-50%) - Lowest EF 25% 1/30/2023.  Complex coronary artery disease - S/P multiple stents with history of in-stent stenosis and thrombosis on Plavix. Also with reported history of stent complications on Ticagrelor, although records are not available to support that. Has been maintained on Prasugrel despite history of CVA.   Moderate aortic stenosis  ESRD on HD - History of angina limiting dialysis sessions before his beta blocker was optimized.   History of DVT - Now chronically on Eliquis.   CVA     I first met Mr. Galloway in January 2023. He was admitted January 29, 2023. Prior to this, he had historically followed with a Cardiologist in [place] at [facility]. He was admitted in 1/2023 due to sudden onset of shortness of breath also with chest pressure/pain.  He was in  acute respiratory failure requiring BiPAP in the ED and was eventually transferred to the ICU for management of pulmonary edema. He underwent volume removal with renal replacement therapy. He was also found to have high sensitivity troponins at that time. An echocardiogram at that time showed reduction in LVEF to 25 to 30%.  Last known EF prior to this was reportedly normal.  He was transferred to our [facility] to undergo cardiac catheterization on 2/1/23, and was found to have progression of coronary artery disease compared to cardiac catheterization in May 2022.  He had in-stent stenosis of the LAD, for which he received PCI and placement of a ADE. He was also found to have an occluded OM stent (placed 5/2022), which could not be wired thus unable to revascularize.  Also with occlusion of the RPAV branch. He was initiated on medical therapy for heart failure, and discharged with a LifeVest. Subsequent echocardiogram showed improvement in LVEF with echo in February 2023 at [facility] showing EF of 35 to 40%.  Repeat echo within our network on 4/24/2023 showed EF of 35%. He was referred EP for ICD evaluation, but this was ultimately deferred due to high risk of infection in the setting of hemodialysis and possible immunosuppression when he gets renal transplant. Lower extremity osteomyelitis also raised concern for a device infection.     GDMT presently consists of Toprol XL 50mg BID, and Entresto 24-26mg once daily.      EKG 7/17/2024  Sinus rhythm with 1st degree A-V block  Right bundle branch block ( ms)     Cardiac Catheterization 2/1/23    1st Mrg lesion is 100% stenosed.    RPAV lesion is 100% stenosed.    Mid LAD lesion is 80% stenosed.    RPDA lesion is 60% stenosed.    The angioplasty was pre-stent angioplasty.    The angioplasty was pre-stent angioplasty.     Multivessel CAD with marked progression since the prior angiographic study in May 2022.  Stents placed in the  mid LAD exhibited significant discrete and-stent restenosis.  The first OM branch, stented in 5/22, has become occluded.  The distal RCA beyond the PDA has akso become occluded.  LAD in--stent restenosis was interrogated by IVUS imaging and successfully treated with placement of a 4.0x13mm Orsiro ADE.  An attempt was made to wire the occluded OM branch, but the wire could not be advanced beyond the stent in mid vessel.      Nuclear Stress 4/14/2022    Stress ECG: A pharmacological stress test was performed using regadenoson. The patient experienced no angina during the test.    Stress ECG: The stress ECG is negative for ischemia after pharmacologic stress.    Perfusion: There is a left ventricular perfusion defect that is medium in size with severe reduction in uptake present in the basal to mid inferior and inferolateral location(s) consistent with prior scar.    Stress Function: Left ventricular function post-stress is normal. Post-stress ejection fraction is 46 %. There is a defect in the basal to mid inferior and inferolateral location(s).    Stress Combined Conclusion: Findings are consistent with inferolateral infarction. There is mild reversibility to this defect, suggesting a small amount of mona-infarct ischemia in the affected territories.      He presents for follow-up evaluation.  Last saw me in the office on August 29, 2024.  Since I last saw him, he has been in the ER after a mechanical fall with head trauma in December.  This was followed by another ER visit earlier this month for right lower extremity swelling during which they found him to have residual chronic DVT for which he was reinitiated on Eliquis 5 mg twice daily.  He is accompanied as usual by his son Sam, who assists with most of the H&P.  Sam advises that they are currently undergoing renal transplant evaluation at multiple centers, working on enrolling the  health network in [place].  The ongoing issue in regards to renal  transplant has been is the need for dual antiplatelet therapy noting prior stent complication when he was transitioned off or on Plavix historically.  His transplant surgeons in the recent past have agreed to operate on DAPT with aspirin and Plavix, but are not open to operating on ticagrelor or prasugrel.    Today, Asad endorses no cardiopulmonary complaints and says that he feels quite well overall.  He denies specifically chest pain, shortness of breath, diaphoresis, dizziness, palpitations.  He has had no orthopnea, does have some right lower extremity swelling in the leg affected by DVT but does not necessarily have edema beyond that.  He has had no syncope.  No issues with bruising or bleeding.    We spoke at length in office today about establishing a P2 Y12 baseline with blood work now.  Informed consent was obtained for helix genetic testing to undergo evaluation for clopidogrel gene testing to assess for resistance.  She signed informed consent form was obtained in office today and scanned into the patient's chart.       ROS    ROS: Pertinent positives and negatives as described in History of Present Illness. Remainder of a 14 point review of systems was negative.     No Known Allergies     Current Outpatient Medications on File Prior to Visit   Medication Sig Dispense Refill    apixaban (Eliquis) 5 mg       aspirin 81 mg chewable tablet Chew 81 mg daily (Patient not taking: Reported on 1/14/2025)      atorvastatin (LIPITOR) 40 mg tablet TAKE 1 TABLET BY MOUTH DAILY WITH DINNER 90 tablet 1    calcitriol (ROCALTROL) 0.5 MCG capsule Take 1 capsule (0.5 mcg total) by mouth 3 (three) times a week 12 capsule 0    cinacalcet (SENSIPAR) 60 MG tablet Take 2 tablets (120 mg total) by mouth 3 (three) times a week 24 tablet 0    divalproex sodium (DEPAKOTE SPRINKLE) 125 MG capsule Take 2 capsules (250 mg total) by mouth every 12 (twelve) hours (Patient not taking: Reported on 1/14/2025) 360 capsule 1    metoprolol  succinate (TOPROL-XL) 25 mg 24 hr tablet Take 1 tablet (25 mg total) by mouth daily      MIDODRINE HCL PO Take 5 mg by mouth PRN w/ dialysis      prasugrel (EFFIENT) tablet TAKE 1 TABLET (5 MG TOTAL) BY MOUTH DAILY. 90 tablet 1    sacubitril-valsartan (Entresto) 24-26 MG TABS Take 1 tablet by mouth in the morning (Patient taking differently: Take 1 tablet by mouth daily at bedtime) 30 tablet 5    Sevelamer Carbonate 2.4 g PACK VIGOROUSLY MIX CONTENTS OF 1 PACKET IN WATER (IT WILL NOT DISSOLVE) AND DRINK 3 TIMES DAILY WITH MEALS      Vitamin D3 1.25 MG (77917 UT) capsule TAKE 1 CAPSULE BY MOUTH ONE TIME PER WEEK (Patient taking differently: No sig reported) 12 capsule 4     No current facility-administered medications on file prior to visit.       Past Medical History:   Diagnosis Date    Cerebrovascular accident (CVA) due to thrombosis of left middle cerebral artery ([facility]) 07/29/2018    Chronic kidney disease     Coronary artery disease     Diabetes mellitus ([facility])     not on meds    Dialysis patient ([facility])     M-W-F    Fistula     left upper arm for hemodialyis    GERD (gastroesophageal reflux disease)     History of coronary artery stent placement     x3    Hypercholesteremia     Hyperlipidemia     Hypertension     Infectious viral hepatitis     B as child    Limb alert care status     no BP/IV left arm    Neuropathy     Nonhealing ulcer of heel ([facility]) 04/25/2023    Obesity     Osteomyelitis ([facility])     last assessed 11/4/16-per son not currently    Penetrating foot wound, left, initial encounter 01/19/2022    PVC's (premature ventricular contractions)     sees  Cardio    Stroke ([facility])     last weeof July 2018 [facility]    TIA (transient ischemic attack) 10/28/2018    Ulcer of left heel, limited to breakdown of skin ([facility]) 06/13/2024    Wears dentures     Wears glasses        Past Surgical History:   Procedure Laterality Date    ABDOMINAL SURGERY      CARDIAC CATHETERIZATION N/A 05/02/2022    Procedure:  "Cardiac Coronary Angiogram;  Surgeon: Sam Davis MD;  [facility];  Service: Cardiology    CARDIAC CATHETERIZATION N/A 05/02/2022    Procedure: Cardiac pci;  Surgeon: Sam Davis MD;  [facility];  Service: Cardiology    CARDIAC CATHETERIZATION  02/01/2023    Procedure: Cardiac catheterization;  Surgeon: Sam Davis MD;  Location: [facility];  Service: Cardiology    CARDIAC CATHETERIZATION N/A 02/01/2023    Procedure: Cardiac pci;  Surgeon: Sam Davis MD;  [facility];  Service: Cardiology    CARDIAC CATHETERIZATION N/A 02/01/2023    Procedure: Cardiac Coronary Angiogram;  Surgeon: Sam Davis MD;  [facility]: [facility];  Service: Cardiology    CARDIAC CATHETERIZATION N/A 02/01/2023    Procedure: Cardiac other-IVUS;  Surgeon: Sam Davis MD;  [facility];  Service: Cardiology    CHOLECYSTECTOMY      Percutaneous    COLONOSCOPY      CYSTOSCOPY      HEMODIALYSIS ADULT  1/22/2024    HEMODIALYSIS ADULT  1/24/2024    IR LOWER EXTREMITY ANGIOGRAM  9/29/2023    IR LOWER EXTREMITY ANGIOGRAM  2/26/2024    OTHER SURGICAL HISTORY      \"stimulator to control bowel movements\"    TX ESOPHAGOGASTRODUODENOSCOPY TRANSORAL DIAGNOSTIC N/A 09/27/2016    Procedure: ESOPHAGOGASTRODUODENOSCOPY (EGD);  Surgeon: Adele Rowe MD;  Location: [facility] GI LAB;  Service: Gastroenterology    TX LAPAROSCOPY SURG CHOLECYSTECTOMY N/A 02/29/2016    Procedure: LAPAROSCOPIC CHOLECYSTECTOMY ;  Surgeon: Cliff Roman DO;  Location: [facility] Main OR;  Service: General    TX SLCTV CATHJ 3RD+ ORD SLCTV ABDL PEL/LXTR BRNC Left 9/29/2023    Procedure: Left leg angiogram with intervention;  Surgeon: Michelle Galvan MD;  Location: [facility];  Service: Vascular    TX SLCTV CATHJ 3RD+ ORD SLCTV ABDL PEL/LXTR BRNCH Left 2/26/2024    Procedure: Left leg angiogram antegrade access, left popliteal angioplasty;  Surgeon: Michelle Galvan MD;  Location: [facility];  " "Service: Vascular    ROTATOR CUFF REPAIR Right     SPINAL CORD STIMULATOR IMPLANT      \"Medtronic interstim model # 3058- in lower back to control bowel movements-currently turned off-battery is dead\"    TOE AMPUTATION Right 10/28/2016    Procedure: 3RD TOE AMPUTATION ;  Surgeon: nAjel Salas DPM;  Location: [facility];  Service:        Family History   Problem Relation Age of Onset    Leukemia Mother     Liver disease Mother     Lung cancer Mother         heavy smoker - 3 ppd    Heart disease Father     Liver disease Father     Diabetes type I Father     Multiple myeloma Sister     Breast cancer Sister     Urolithiasis Family     Alcohol abuse Neg Hx     Depression Neg Hx     Drug abuse Neg Hx     Substance Abuse Neg Hx     Mental illness Neg Hx        Social History     Socioeconomic History    Marital status: /Civil Union     Spouse name: Not on file    Number of children: Not on file    Years of education: Not on file    Highest education level: Not asked   Occupational History    Occupation: disabled   Tobacco Use    Smoking status: Never    Smokeless tobacco: Never   Vaping Use    Vaping status: Never Used   Substance and Sexual Activity    Alcohol use: Never    Drug use: No    Sexual activity: Not Currently     Partners: Female     Comment: defer   Other Topics Concern    Not on file   Social History Narrative    Daily caffeine consumption 2-3 servings a day     Social Drivers of Health     Financial Resource Strain: Patient Declined (7/13/2023)    Overall Financial Resource Strain (CARDIA)     Difficulty of Paying Living Expenses: Patient declined   Food Insecurity: No Food Insecurity (12/19/2024)    Hunger Vital Sign     Worried About Running Out of Food in the Last Year: Never true     Ran Out of Food in the Last Year: Never true   Transportation Needs: No Transportation Needs (12/19/2024)    PRAPARE - Transportation     Lack of Transportation (Medical): No     Lack of Transportation " (Non-Medical): No   Physical Activity: Not on file   Stress: No Stress Concern Present (1/18/2019)    [employer] [place] [employer] - Occupational Stress Questionnaire     Feeling of Stress : Only a little   Social Connections: Unknown (1/18/2019)    Social Connection and Isolation Panel [NHANES]     Frequency of Communication with Friends and Family: Not on file     Frequency of Social Gatherings with Friends and Family: Not on file     Attends Congregational Services: Not on file     Active Member of Clubs or Organizations: Not on file     Attends Club or Organization Meetings: Not on file     Marital Status:    Intimate Partner Violence: Not At Risk (1/18/2019)    Humiliation, Afraid, Rape, and Kick questionnaire     Fear of Current or Ex-Partner: No     Emotionally Abused: No     Physically Abused: No     Sexually Abused: No   Housing Stability: Low Risk  (12/19/2024)    Housing Stability Vital Sign     Unable to Pay for Housing in the Last Year: No     Number of Times Moved in the Last Year: 0     Homeless in the Last Year: No       OBJECTIVE:    There were no vitals taken for this visit.     BP Readings from Last 3 Encounters:   12/23/24 113/56   12/19/24 122/78   12/15/24 112/56       Wt Readings from Last 3 Encounters:   12/19/24 95.3 kg (210 lb)   12/15/24 99.9 kg (220 lb 3.8 oz)   11/19/24 94.3 kg (208 lb)         Physical Exam  Vitals reviewed.   Constitutional:       General: He is not in acute distress.     Appearance: Normal appearance. He is not diaphoretic.      Comments: Objectively appears improved compared to prior assessments.   HENT:      Head: Normocephalic and atraumatic.   Eyes:      Conjunctiva/sclera: Conjunctivae normal.   Neck:      Vascular: No carotid bruit or JVD.   Cardiovascular:      Rate and Rhythm: Normal rate and regular rhythm.      Pulses: Normal pulses.      Heart sounds: Normal heart sounds. No murmur heard.     No friction rub. No gallop.   Pulmonary:       Effort: Pulmonary effort is normal.      Breath sounds: Normal breath sounds. No wheezing, rhonchi or rales.   Abdominal:      General: Abdomen is flat. Bowel sounds are normal. There is no distension.      Palpations: Abdomen is soft.   Musculoskeletal:      Right lower leg: No edema.      Left lower leg: No edema.   Skin:     General: Skin is warm and dry.      Comments: LUE AV fistula.      Neurological:      General: No focal deficit present.      Mental Status: He is alert and oriented to person, place, and time.   Psychiatric:         Mood and Affect: Mood normal.         Behavior: Behavior normal.                                                               LABS:  Lab Results   Component Value Date    GLUCOSE 96 07/17/2024    BUN 20 01/11/2025    CREATININE 4.64 (H) 01/11/2025    CALCIUM 7.8 (L) 01/11/2025     08/10/2016    K 3.9 01/11/2025    CO2 31 01/11/2025    CL 98 (L) 01/11/2025    ALKPHOS 103 01/11/2025    BILITOT 0.6 08/10/2016    PROT 6.7 08/10/2016    AST 4 01/11/2025    ALT 4 01/11/2025    ANIONGAP 9 01/03/2016        Lab Results   Component Value Date    WBC 5.06 12/30/2024    HGB 9.9 (L) 12/30/2024    HCT 30.5 (L) 12/30/2024    MCV 98 12/30/2024     (L) 12/30/2024       Lab Results   Component Value Date    CHOL 203 (H) 08/10/2016    HDL 30 (L) 01/30/2023    LDLCALC 21 01/30/2023    TRIG 123 01/30/2023       Lab Results   Component Value Date    HGBA1C 5.5 11/07/2024         ASSESSMENT/PLAN:  Diagnoses and all orders for this visit:    CAD, multiple vessel  -     [employer] [facility] Clopidogrel Test; Future  -     P2Y12 Platelet inhibitor; Future    Status post insertion of drug eluting coronary artery stent  -     [employer] [facility] Clopidogrel Test; Future  -     P2Y12 Platelet inhibitor; Future    Antiplatelet or antithrombotic long-term use  -     [employer] [facility] Clopidogrel Test; Future  -     P2Y12 Platelet inhibitor; Future    Primary  hypertension    Mixed hyperlipidemia    Ischemic cardiomyopathy    Chronic systolic heart failure (HCC)    Nonrheumatic aortic valve stenosis    ESRD on dialysis (HCC)    Pre-kidney transplant, listed    Chronic deep vein thrombosis (DVT) of distal vein of right lower extremity (HCC)    Type 2 diabetes mellitus with chronic kidney disease on chronic dialysis, without long-term current use of insulin (HCC)    Other orders  -     apixaban (Eliquis) 5 mg    This is a 64-year-old male patient very well-known to me from prior encounters, presenting for follow-up evaluation.  His chronic cardiac conditions are presently stable, he has no anginal complaints and he is clinically euvolemic from a heart failure standpoint, NYHA class II although functional capacity is fairly difficult to ascertain due to baseline ambulatory dysfunction.    Overall, he is doing better today than he has in the past historically in regards to symptoms and functional capacity.  He is also more alert in the office today.    I spoke in great detail with the patient and his son Sam regarding my management recommendations as they pertain to coronary artery disease, his history of Plavix nonresponder status, ischemic cardiomyopathy with partial recovery of LVEF, prekidney transplant status, chronic DVT.  We also reviewed in detail aortic valve stenosis which has historically been in the moderate range, most recently with a aortic valve area calculated by continuity at 1.2 cm² on the transthoracic echo performed at [facility] 8/1/2024.  The patient's son tells me that there have been preliminary discussions regarding transcatheter aortic valve replacement early prior to renal transplant due to concerns for contrast-induced injury of the transplanted renal kidney if he required a transcatheter aortic valve replacement post transplantation.  The patient and son have been in contact with his prior cardiologist in [place], and will be following up there to be  evaluated for TAVR.  He states that they were being scheduled for a transthoracic echo there to establish an updated baseline as it is now been approximately 6 months since his last echo evaluation.    I advised Sam and the patient that I feel that he is at intermediate to high risk for any surgery including renal transplant, however his risk profile is not prohibitive.  I feel that he is in the best condition I have seen him in recently, and we may have a narrow window of opportunity to safely get him through this procedure.  I advised that he follow-up with his cardiologist in [place] in regards to TAVR evaluation, and also offered him referral to our CT surgery structural heart team here to be evaluated for the same.  In my opinion is a general cardiologist, his valve is moderate and he is asymptomatic from the standpoint and therefore there is currently no clear indication for valve replacement.  I did advise the patient and his son regarding the accelerated speed of disease progression in patients with ESRD, and the reasonable likelihood of needing valve replacement within the next 3 years.    We then shifted focus to his P2 Y12 inhibitor Effient, which the patient's son tells me is a medication that he cannot safely be transplanted on.  His transplant surgery team at [facility] has requested that he be transition to clopidogrel instead.  To better facilitate this safely, I have recommended obtaining a baseline P2 Y12 level now as he is on prasugrel and should have below normal levels of P2 Y12 demonstrating inhibition.  I have also referred him for helix genetic testing to be assessed for the genetic predisposition to be a nonresponder to clopidogrel.  If he is not considered genetically to be a nonresponder, we will attempt to transition him over to clopidogrel once again with close monitoring of P2 Y12 inhibition by weekly blood testing until adequate P2 Y12 inhibition can be achieved likely with  "escalating doses of clopidogrel.  I advised him that this is an off label and not well studied management option, but it is 1 that they would like to consider as it appears to be a limitation to him receiving a kidney transplant.    He will follow-up with me in office at a 4-week interval to review the results of all testing and discussed symptoms.    In the interim, he will continue all cardiac medications including Eliquis 5 mg twice daily, atorvastatin 40 mg once daily, Toprol-XL 25 mg daily on nondialysis days.  He will continue Effient 5 mg daily, Entresto 24-26 mg once daily.  He is presently off of aspirin since he is now taking Eliquis.        Britney Tan MD [facility]      Portions of the record may have been created with voice recognition software. Occasional wrong word or \"sound alike\" substitutions may have occurred due to the inherent limitations of voice recognition software. Please review the chart carefully and recognize, using context, where substitutions/typographical errors may have occurred.     "

## 2025-01-16 ENCOUNTER — HOSPITAL ENCOUNTER (OUTPATIENT)
Dept: RADIOLOGY | Facility: HOSPITAL | Age: 65
Discharge: HOME | End: 2025-01-16
Payer: MEDICARE

## 2025-01-16 ENCOUNTER — OFFICE VISIT (OUTPATIENT)
Dept: HEMATOLOGY ONCOLOGY | Facility: MEDICAL CENTER | Age: 65
End: 2025-01-16
Payer: MEDICARE

## 2025-01-16 ENCOUNTER — APPOINTMENT (OUTPATIENT)
Facility: CLINIC | Age: 65
End: 2025-01-16
Payer: MEDICARE

## 2025-01-16 ENCOUNTER — APPOINTMENT (OUTPATIENT)
Dept: LAB | Facility: CLINIC | Age: 65
End: 2025-01-16
Payer: MEDICARE

## 2025-01-16 ENCOUNTER — HOSPITAL ENCOUNTER (OUTPATIENT)
Dept: NON INVASIVE DIAGNOSTICS | Facility: CLINIC | Age: 65
Discharge: HOME | End: 2025-01-16
Payer: MEDICARE

## 2025-01-16 VITALS
DIASTOLIC BLOOD PRESSURE: 66 MMHG | HEIGHT: 69 IN | OXYGEN SATURATION: 98 % | HEART RATE: 77 BPM | TEMPERATURE: 97.7 F | RESPIRATION RATE: 17 BRPM | SYSTOLIC BLOOD PRESSURE: 111 MMHG | BODY MASS INDEX: 30.86 KG/M2

## 2025-01-16 DIAGNOSIS — E11.22 TYPE 2 DIABETES MELLITUS WITH CHRONIC KIDNEY DISEASE ON CHRONIC DIALYSIS, WITHOUT LONG-TERM CURRENT USE OF INSULIN (HCC): ICD-10-CM

## 2025-01-16 DIAGNOSIS — F39 MOOD DISORDER (HCC): Chronic | ICD-10-CM

## 2025-01-16 DIAGNOSIS — N18.6 TYPE 2 DIABETES MELLITUS WITH CHRONIC KIDNEY DISEASE ON CHRONIC DIALYSIS, WITHOUT LONG-TERM CURRENT USE OF INSULIN (HCC): ICD-10-CM

## 2025-01-16 DIAGNOSIS — Z95.5 STATUS POST INSERTION OF DRUG ELUTING CORONARY ARTERY STENT: ICD-10-CM

## 2025-01-16 DIAGNOSIS — I25.10 CAD, MULTIPLE VESSEL: ICD-10-CM

## 2025-01-16 DIAGNOSIS — Z79.02 ANTIPLATELET OR ANTITHROMBOTIC LONG-TERM USE: ICD-10-CM

## 2025-01-16 DIAGNOSIS — I82.451 ACUTE EMBOLISM AND THROMBOSIS OF RIGHT PERONEAL VEIN (HCC): ICD-10-CM

## 2025-01-16 DIAGNOSIS — M25.561 ACUTE PAIN OF RIGHT KNEE: ICD-10-CM

## 2025-01-16 DIAGNOSIS — Z99.2 TYPE 2 DIABETES MELLITUS WITH CHRONIC KIDNEY DISEASE ON CHRONIC DIALYSIS, WITHOUT LONG-TERM CURRENT USE OF INSULIN (HCC): ICD-10-CM

## 2025-01-16 DIAGNOSIS — Z86.718 HISTORY OF DVT (DEEP VEIN THROMBOSIS): Primary | ICD-10-CM

## 2025-01-16 LAB — PA ADP BLD-ACNC: 265 PRU (ref 194–418)

## 2025-01-16 PROCEDURE — 85576 BLOOD PLATELET AGGREGATION: CPT

## 2025-01-16 PROCEDURE — 93971 EXTREMITY STUDY: CPT | Performed by: SURGERY

## 2025-01-16 PROCEDURE — 93971 EXTREMITY STUDY: CPT

## 2025-01-16 PROCEDURE — 73564 X-RAY EXAM KNEE 4 OR MORE: CPT

## 2025-01-16 PROCEDURE — 36415 COLL VENOUS BLD VENIPUNCTURE: CPT

## 2025-01-16 PROCEDURE — 99214 OFFICE O/P EST MOD 30 MIN: CPT | Performed by: PHYSICIAN ASSISTANT

## 2025-01-16 NOTE — PROGRESS NOTES
Name: Asad Galloway      : 1960      MRN: [id number]  Encounter Provider: Dayanara Holbrook PA-C  Encounter Date: 2025   Encounter department: [facility]  :  Assessment & Plan  History of DVT (deep vein thrombosis)  Patient presents for follow-up of DVT.     He has complicated past medical history.  He has end-stage renal disease on dialysis, congestive heart failure.  History of CVA in 2018.  History of cardiac stents placed in .  History of peripheral arterial disease.  He is currently on Effient.  Aspirin was stopped when he started Eliquis     He had venous duplex on 2024 with finding of DVT in one of the paired peroneal veins at the proximal cath in the right lower extremity.  He suffered a fall about 1 week prior to this in which he bruised his right ankle and was minimally mobile for a few days.     He was placed on Eliquis at that time.      It seems likely that DVT was provoked by fall and subsequent lack of mobility.     He is currently on the kidney transplant list through [facility].  His son was concerned about keeping him on Eliquis for the shortest period of time possible due to being on kidney transplant list.      He had repeat venous duplex on 2024.  This showed evidence of chronic DVT in one of the paired peroneal veins at the proximal and mid cath in the right lower extremity.    The Eliquis was ultimately discontinued by his vascular physician after around 4 months time.  He stopped Eliquis in 2024.       Acute embolism and thrombosis of right peroneal vein (HCC)  More recently, patient developed pain and swelling in his distal right lower extremity.     He had a repeat doppler on 2025 (@ [facility] so no comparison) which showed evidence of nonocclusive noncompressible thrombus in the mid right peroneal vein. No other evidence of deep or superficial venous thrombosis in the right lower extremity.    He was placed back  on Eliquis.    The new DVT finding appears to be at the exact location of the one prior. I am not sure if this really represents a new DVT or is just showing the changes that were present prior. He does have symptoms to suggest a new DVT (swelling in the right calf). He also has right knee pain but his son says he hit his knee against his garage door yesterday and feels the pain is secondary to that.    He will be sent for venous duplex to clarfiy whether this is a new DVT or not. I will call his son, Sam, with results and our recommendations.    He knows to continue the Eliquis for now.    Discussed with Dr. Bourne.    Orders:  •  VAS VENOUS DUPLEX -LOWER LIMB UNILATERAL; Future        History of Present Illness {?Quick Links Encounters * My Last Note * Last Note in Specialty * Snapshot * Since Last Visit * History :09863}  Chief Complaint   Patient presents with   • Follow-up   History of present illness:   Patient presents for follow-up of DVT.     His past medical history is significant for end-stage renal disease on dialysis, congestive heart failure, anemia, hyperlipidemia, type 2 diabetes (no longer on any medications). He has history of CVA in July 2018. He also had stents placed in 2022. He is on Effient and aspirin. His son says he is resistant to plavix.     He is on the kidney transplant list at [facility].     He had a recent hospitalization at [facility] from 7/14/2024 through 7/16/2024 for cough, sore throat, fatigue and swollen right leg.     Venous duplex was significant for DVT in one of the paired peroneal veins at the proximal calf.  He was started on heparin drip and then transitioned to Eliquis on discharge.     He was seen in the ED previous to this on 7/5/2024 after suffering a fall.  He complained primarily of right ankle pain.  X-ray did not show any dislocation or fracture.     He denies any prior history of VTE.  No family history of VTE.     His son reports that since  he has been on the kidney transplant list he has undergone thrombosis panel of testing every 3 years time (I cannot find these results in the charting.)  He says that this was completed most recently 1 year ago and the testing was unremarkable.     He had repeat venous duplex on 11/7/2024.  This showed evidence of chronic DVT in one of the paired peroneal veins at the proximal and mid cath in the right lower extremity.     His Eliquis was discontinued by his vascular physician in November 2024.      More recently, patient developed pain and swelling in the distal right lower extremity.  He presented to the ED at [facility].  He had venous duplex on 1/11/2024 which showed evidence of nonocclusive noncompressible thrombus in the mid right peroneal vein.  He was placed back on Eliquis.     He continues to have swelling in the distal right lower extremity.  His son says that he hit his knee against his garage door yesterday.  He says that he has significant discomfort in his knee right now.  He has no chest pain or shortness of breath.  No bleeding or excessive bruising on the Eliquis.    Review of Systems   Constitutional:  Positive for fatigue. Negative for activity change, appetite change and fever.   HENT:  Negative for nosebleeds.    Respiratory:  Negative for cough, choking and shortness of breath.    Cardiovascular:  Positive for leg swelling. Negative for chest pain and palpitations.   Gastrointestinal:  Negative for abdominal distention, abdominal pain, anal bleeding, blood in stool, constipation, diarrhea, nausea and vomiting.   Endocrine: Negative for cold intolerance.   Genitourinary:  Negative for hematuria.   Musculoskeletal:  Positive for arthralgias. Negative for myalgias.   Skin:  Negative for color change, pallor and rash.   Allergic/Immunologic: Negative for immunocompromised state.   Neurological:  Negative for headaches.   Hematological:  Negative for adenopathy. Does not bruise/bleed easily.   All other  systems reviewed and are negative.          Objective {?Quick Links Trend Vitals * Enter New Vitals * Results Review * Timeline (Adult) * Labs * Imaging * Cardiology * Procedures * Lung Cancer Screening * Surgical eConsent :59594}  Vitals:    01/16/25 0808   BP: 111/66   Pulse: 77   Resp: 17   Temp: 97.7 °F (36.5 °C)   SpO2: 98%       Physical Exam  Constitutional:       General: He is not in acute distress.     Appearance: Normal appearance. He is well-developed.   HENT:      Head: Normocephalic and atraumatic.      Right Ear: External ear normal.      Left Ear: External ear normal.      Nose: Nose normal.   Eyes:      General: No scleral icterus.     Conjunctiva/sclera: Conjunctivae normal.   Cardiovascular:      Rate and Rhythm: Normal rate and regular rhythm.   Pulmonary:      Effort: Pulmonary effort is normal. No respiratory distress.      Breath sounds: Normal breath sounds.   Abdominal:      General: Abdomen is flat. There is no distension.      Palpations: Abdomen is soft.      Tenderness: There is no abdominal tenderness.   Skin:     Findings: No rash (on exposed skin.).   Neurological:      Mental Status: He is alert and oriented to person, place, and time.   Psychiatric:         Mood and Affect: Mood normal.         Thought Content: Thought content normal.         Judgment: Judgment normal.         Labs: I have reviewed the following labs:  Results for orders placed or performed during the hospital encounter of 12/22/24   CBC and differential   Result Value Ref Range    WBC 5.41 4.31 - 10.16 Thousand/uL    RBC 3.30 (L) 3.88 - 5.62 Million/uL    Hemoglobin 10.4 (L) 12.0 - 17.0 g/dL    Hematocrit 32.9 (L) 36.5 - 49.3 %     (H) 82 - 98 fL    MCH 31.5 26.8 - 34.3 pg    MCHC 31.6 31.4 - 37.4 g/dL    RDW 15.7 (H) 11.6 - 15.1 %    MPV 11.1 8.9 - 12.7 fL    Platelets 134 (L) 149 - 390 Thousands/uL    nRBC 0 /100 WBCs    Segmented % 74 43 - 75 %    Immature Grans % 0 0 - 2 %    Lymphocytes % 13 (L) 14 - 44  "%    Monocytes % 11 4 - 12 %    Eosinophils Relative 2 0 - 6 %    Basophils Relative 0 0 - 1 %    Absolute Neutrophils 3.96 1.85 - 7.62 Thousands/µL    Absolute Immature Grans 0.02 0.00 - 0.20 Thousand/uL    Absolute Lymphocytes 0.71 0.60 - 4.47 Thousands/µL    Absolute Monocytes 0.59 0.17 - 1.22 Thousand/µL    Eosinophils Absolute 0.11 0.00 - 0.61 Thousand/µL    Basophils Absolute 0.02 0.00 - 0.10 Thousands/µL   Comprehensive metabolic panel   Result Value Ref Range    Sodium 137 135 - 147 mmol/L    Potassium 4.5 3.5 - 5.3 mmol/L    Chloride 93 (L) 96 - 108 mmol/L    CO2 31 21 - 32 mmol/L    ANION GAP 13 4 - 13 mmol/L    BUN 36 (H) 5 - 25 mg/dL    Creatinine 7.13 (H) 0.60 - 1.30 mg/dL    Glucose 123 65 - 140 mg/dL    Calcium 7.9 (L) 8.4 - 10.2 mg/dL    AST 4 (L) 13 - 39 U/L    ALT 5 (L) 7 - 52 U/L    Alkaline Phosphatase 107 (H) 34 - 104 U/L    Total Protein 7.2 6.4 - 8.4 g/dL    Albumin 4.0 3.5 - 5.0 g/dL    Total Bilirubin 0.70 0.20 - 1.00 mg/dL    eGFR 7 ml/min/1.73sq m   HS Troponin 0hr (reflex protocol)   Result Value Ref Range    hs TnI 0hr 47 \"Refer to ACS Flowchart\"- see link ng/L   HS Troponin I 2hr   Result Value Ref Range    hs TnI 2hr 52 (H) \"Refer to ACS Flowchart\"- see link ng/L    Delta 2hr hsTnI 5 <20 ng/L   HS Troponin I 4hr   Result Value Ref Range    hs TnI 4hr 50 (H) \"Refer to ACS Flowchart\"- see link ng/L    Delta 4hr hsTnI 3 <20 ng/L   Basic metabolic panel   Result Value Ref Range    Sodium 134 (L) 135 - 147 mmol/L    Potassium 4.9 3.5 - 5.3 mmol/L    Chloride 93 (L) 96 - 108 mmol/L    CO2 30 21 - 32 mmol/L    ANION GAP 11 4 - 13 mmol/L    BUN 40 (H) 5 - 25 mg/dL    Creatinine 7.59 (H) 0.60 - 1.30 mg/dL    Glucose 121 65 - 140 mg/dL    Calcium 7.6 (L) 8.4 - 10.2 mg/dL    eGFR 6 ml/min/1.73sq m   CBC (With Platelets)   Result Value Ref Range    WBC 5.47 4.31 - 10.16 Thousand/uL    RBC 3.05 (L) 3.88 - 5.62 Million/uL    Hemoglobin 9.6 (L) 12.0 - 17.0 g/dL    Hematocrit 30.1 (L) 36.5 - 49.3 %    " MCV 99 (H) 82 - 98 fL    MCH 31.5 26.8 - 34.3 pg    MCHC 31.9 31.4 - 37.4 g/dL    RDW 15.7 (H) 11.6 - 15.1 %    Platelets 138 (L) 149 - 390 Thousands/uL    MPV 11.6 8.9 - 12.7 fL   Fingerstick Glucose (POCT)   Result Value Ref Range    POC Glucose 103 65 - 140 mg/dl     *Note: Due to a large number of results and/or encounters for the requested time period, some results have not been displayed. A complete set of results can be found in Results Review.

## 2025-01-16 NOTE — ASSESSMENT & PLAN NOTE
More recently, patient developed pain and swelling in his distal right lower extremity.     He had a repeat doppler on 1/11/2025 (@ [facility] so no comparison) which showed evidence of nonocclusive noncompressible thrombus in the mid right peroneal vein. No other evidence of deep or superficial venous thrombosis in the right lower extremity.    He was placed back on Eliquis.    The new DVT finding appears to be at the exact location of the one prior. I am not sure if this really represents a new DVT or is just showing the changes that were present prior. He does have symptoms to suggest a new DVT (swelling in the right calf). He also has right knee pain but his son says he hit his knee against his garage door yesterday and feels the pain is secondary to that.    He will be sent for venous duplex to clarfiy whether this is a new DVT or not. I will call his son, Sam, with results and our recommendations.    He knows to continue the Eliquis for now.    Discussed with Dr. Bourne.    Orders:  •  VAS VENOUS DUPLEX -LOWER LIMB UNILATERAL; Future

## 2025-01-17 ENCOUNTER — TELEPHONE (OUTPATIENT)
Dept: HEMATOLOGY ONCOLOGY | Facility: MEDICAL CENTER | Age: 65
End: 2025-01-17

## 2025-01-17 NOTE — TELEPHONE ENCOUNTER
Reviewed venous duplex report from 1/16/2025 with Dr. Bourne.  Report shows evidence of chronic nonocclusive DVT in one of the pair peroneal veins in the mid calf.  There is no change compared to prior Doppler in November 2024.    This finding is consistent with chronic DVT, not acute as previously speculated.    I spoke to patient's son Sam by telephone.  From our standpoint, patient may stop the Eliquis and continue with his antiplatelet therapy as prescribed by cardiology.  Recommend compression stocking to right lower extremity to assist with swelling.    Patient does not need hematology follow-up but his son knows to call if needed.

## 2025-01-20 ENCOUNTER — TELEPHONE (OUTPATIENT)
Age: 65
End: 2025-01-20

## 2025-01-20 ENCOUNTER — TELEPHONE (OUTPATIENT)
Dept: CASE MANAGEMENT | Facility: HOSPITAL | Age: 65
End: 2025-01-20

## 2025-01-20 NOTE — TELEPHONE ENCOUNTER
Patient son call about getting the father to a physical therapy place. Please assist the patient with a referral for physical therapy and not an at home physical therapy at a facility physical therapy. Thank you

## 2025-01-20 NOTE — TELEPHONE ENCOUNTER
Pt/ Family called wanting to speak with  re: the pt going to rehab. CM called patient back and spoke with the patient's son Sam. Per Sam, the pt was rec'd rehab while he was in the hospital but went home with [facility]. Pt has had multiple falls and is not doing good with [facility] so family would like rehab.CM is unable to send referrals / assist with rehab placement because pt is not admitted in the hospital. CM advised family to follow up with PCP or bring patient back  to the hospital.

## 2025-01-21 ENCOUNTER — APPOINTMENT (OUTPATIENT)
Facility: CLINIC | Age: 65
End: 2025-01-21
Payer: MEDICARE

## 2025-01-23 ENCOUNTER — APPOINTMENT (OUTPATIENT)
Facility: CLINIC | Age: 65
End: 2025-01-23
Payer: MEDICARE

## 2025-01-28 ENCOUNTER — APPOINTMENT (OUTPATIENT)
Facility: CLINIC | Age: 65
End: 2025-01-28
Payer: MEDICARE

## 2025-01-28 ENCOUNTER — TELEPHONE (OUTPATIENT)
Age: 65
End: 2025-01-28

## 2025-01-28 ENCOUNTER — OFFICE VISIT (OUTPATIENT)
Dept: PODIATRY | Facility: CLINIC | Age: 65
End: 2025-01-28
Payer: MEDICARE

## 2025-01-28 VITALS — BODY MASS INDEX: 30.96 KG/M2 | HEIGHT: 69 IN | WEIGHT: 209 LBS

## 2025-01-28 DIAGNOSIS — E11.42 DIABETIC POLYNEUROPATHY ASSOCIATED WITH TYPE 2 DIABETES MELLITUS (HCC): Primary | ICD-10-CM

## 2025-01-28 DIAGNOSIS — Z89.421 ABSENCE OF TOE OF RIGHT FOOT (HCC): ICD-10-CM

## 2025-01-28 DIAGNOSIS — R45.86 EMOTIONAL LABILITY: ICD-10-CM

## 2025-01-28 DIAGNOSIS — I73.9 PERIPHERAL ARTERIAL DISEASE (HCC): ICD-10-CM
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
## 2025-01-28 DIAGNOSIS — Z86.73 HISTORY OF STROKE IN ADULTHOOD: ICD-10-CM

## 2025-01-28 DIAGNOSIS — R26.2 AMBULATORY DYSFUNCTION: ICD-10-CM

## 2025-01-28 DIAGNOSIS — R53.1 GENERALIZED WEAKNESS: ICD-10-CM

## 2025-01-28 DIAGNOSIS — F39 MOOD DISORDER (HCC): Primary | Chronic | ICD-10-CM

## 2025-01-28 PROCEDURE — 99213 OFFICE O/P EST LOW 20 MIN: CPT | Performed by: PODIATRIST

## 2025-01-28 NOTE — TELEPHONE ENCOUNTER
Referral placed, does not require faxing since it is a [facility]'s facility, they will be able to see order in the system.

## 2025-01-28 NOTE — TELEPHONE ENCOUNTER
Patient son call about getting a script for PT and OT to be sent to [facility] at fax number [phone]. Please assist with faxing a script for PT and OT. Thank you

## 2025-01-28 NOTE — PROGRESS NOTES
Patient ID: Asad Galloway is a 64 y.o. male Date of Birth 1960     Assessment:    No problem-specific Assessment & Plan notes found for this encounter.       Diagnoses and all orders for this visit:    Diabetic polyneuropathy associated with type 2 diabetes mellitus (HCC)  -     Diabetic Shoe Inserts  -     Diabetic Shoe    Peripheral arterial disease (HCC)  -     Diabetic Shoe Inserts  -     Diabetic Shoe    Absence of toe of right foot (HCC)  -     Diabetic Shoe Inserts  -     Diabetic Shoe        Procedures    Plan:  Reviewed medical records.  Reviewed the notes from wound care.  Discussed his condition and prognosis.    Instructed skin care and protection.  Recommended to use foam pad to left heel.  Instructed him to use Prevalon boot at night.    Sent him for new DM shoes and inserts.  4.  Educated disease prevention and risks related to diabetes.  Educated proper daily foot care and exam.  Instructed proper skin care / protection and footwear.  Instructed to identify any signs of infection and related foot problem.  Nails and HPK debrided.    5. The recent blood work was reviewed / discussed and the last HbA1c was 5.5.    6. The patient will return in 3 months for diabetic foot exam.               Subjective:          HPI    The patient presents for diabetic foot exam.  He has high risk diabetic foot with history of DFU, toe amputation, PAD, and DPN.  No recurring ulcer or pain.  BS is under control.  He did not obtain new diabetic shoes yet.  He is not compliant about using lotions on his feet or wearing the Prevalon boot at night according to his wife.        The following portions of the patient's history were reviewed and updated as appropriate: allergies, current medications, past family history, past medical history, past social history, past surgical history, and problem list.      PAST MEDICAL HISTORY:  Past Medical History:   Diagnosis Date    Cerebrovascular accident (CVA) due to thrombosis of  left middle cerebral artery (HCC) 07/29/2018    Chronic kidney disease     Coronary artery disease     Diabetes mellitus (HCC)     not on meds    Dialysis patient (Formerly [facility])     M-W-F    Fistula     left upper arm for hemodialyis    GERD (gastroesophageal reflux disease)     History of coronary artery stent placement     x3    Hypercholesteremia     Hyperlipidemia     Hypertension     Infectious viral hepatitis     B as child    Limb alert care status     no BP/IV left arm    Neuropathy     Nonhealing ulcer of heel (HCC) 04/25/2023    Obesity     Osteomyelitis (HCC)     last assessed 11/4/16-per son not currently    Penetrating foot wound, left, initial encounter 01/19/2022    PVC's (premature ventricular contractions)     sees [facility]    Stroke (Formerly [facility])     last weeof July 2018 [facility]    TIA (transient ischemic attack) 10/28/2018    Ulcer of left heel, limited to breakdown of skin ([facility]ly [facility]) 06/13/2024    Wears dentures     Wears glasses        PAST SURGICAL HISTORY:  Past Surgical History:   Procedure Laterality Date    ABDOMINAL SURGERY      CARDIAC CATHETERIZATION N/A 05/02/2022    Procedure: Cardiac Coronary Angiogram;  Surgeon: Sam Davis MD;  [facility];  Service: Cardiology    CARDIAC CATHETERIZATION N/A 05/02/2022    Procedure: Cardiac pci;  Surgeon: Sam Davis MD;  [facility];  Service: Cardiology    CARDIAC CATHETERIZATION  02/01/2023    Procedure: Cardiac catheterization;  Surgeon: Sam Davis MD;  [facility];  Service: Cardiology    CARDIAC CATHETERIZATION N/A 02/01/2023    Procedure: Cardiac pci;  Surgeon: Sam Davis MD;  [facility];  Service: Cardiology    CARDIAC CATHETERIZATION N/A 02/01/2023    Procedure: Cardiac Coronary Angiogram;  Surgeon: Sam Davis MD;  [facility];  Service: Cardiology    CARDIAC CATHETERIZATION N/A 02/01/2023    Procedure: Cardiac other-IVUS;  Surgeon: Sam Davis  "MD;  [facility];  Service: Cardiology    CHOLECYSTECTOMY      Percutaneous    COLONOSCOPY      CYSTOSCOPY      HEMODIALYSIS ADULT  1/22/2024    HEMODIALYSIS ADULT  1/24/2024    IR LOWER EXTREMITY ANGIOGRAM  9/29/2023    IR LOWER EXTREMITY ANGIOGRAM  2/26/2024    OTHER SURGICAL HISTORY      \"stimulator to control bowel movements\"    KS ESOPHAGOGASTRODUODENOSCOPY TRANSORAL DIAGNOSTIC N/A 09/27/2016    Procedure: ESOPHAGOGASTRODUODENOSCOPY (EGD);  Surgeon: Adele Rowe MD;  Location: [facility];  Service: Gastroenterology    KS LAPAROSCOPY SURG CHOLECYSTECTOMY N/A 02/29/2016    Procedure: LAPAROSCOPIC CHOLECYSTECTOMY ;  Surgeon: Cliff Roman DO;  Location: [facility];  Service: General    KS SLCTV CATHJ 3RD+ ORD SLCTV ABDL PEL/LXTR BRNCH Left 9/29/2023    Procedure: Left leg angiogram with intervention;  Surgeon: Michelle Galvan MD;  [facility]: [facility];  Service: Vascular    KS SLCTV CATHJ 3RD+ ORD SLCTV ABDL PEL/LXTR BRNCH Left 2/26/2024    Procedure: Left leg angiogram antegrade access, left popliteal angioplasty;  Surgeon: Michelle Galvan MD;  [facility];  Service: Vascular    ROTATOR CUFF REPAIR Right     SPINAL CORD STIMULATOR IMPLANT      \"Medtronic interstim model # 3058- in lower back to control bowel movements-currently turned off-battery is dead\"    TOE AMPUTATION Right 10/28/2016    Procedure: 3RD TOE AMPUTATION ;  Surgeon: Anjel Salas DPM;  [facility];  Service:         ALLERGIES:  Patient has no known allergies.    MEDICATIONS:  Current Outpatient Medications   Medication Sig Dispense Refill    atorvastatin (LIPITOR) 40 mg tablet TAKE 1 TABLET BY MOUTH DAILY WITH DINNER 90 tablet 1    cinacalcet (SENSIPAR) 60 MG tablet Take 2 tablets (120 mg total) by mouth 3 (three) times a week 24 tablet 0    metoprolol succinate (TOPROL-XL) 25 mg 24 hr tablet Take 1 tablet (25 mg total) by mouth daily      MIDODRINE HCL PO Take 5 mg by mouth PRN w/ dialysis "      prasugrel (EFFIENT) tablet TAKE 1 TABLET (5 MG TOTAL) BY MOUTH DAILY. 90 tablet 1    sacubitril-valsartan (Entresto) 24-26 MG TABS Take 1 tablet by mouth in the morning 30 tablet 5    Sevelamer Carbonate 2.4 g PACK VIGOROUSLY MIX CONTENTS OF 1 PACKET IN WATER (IT WILL NOT DISSOLVE) AND DRINK 3 TIMES DAILY WITH MEALS      Vitamin D3 1.25 MG (53601 UT) capsule TAKE 1 CAPSULE BY MOUTH ONE TIME PER WEEK 12 capsule 4    aspirin 81 mg chewable tablet Chew 81 mg daily (Patient not taking: Chew 81 mg daily)      calcitriol (ROCALTROL) 0.5 MCG capsule Take 1 capsule (0.5 mcg total) by mouth 3 (three) times a week 12 capsule 0    divalproex sodium (DEPAKOTE SPRINKLE) 125 MG capsule Take 2 capsules (250 mg total) by mouth every 12 (twelve) hours (Patient not taking: Reported on 1/14/2025) 360 capsule 1     No current facility-administered medications for this visit.       SOCIAL HISTORY:  Social History     Socioeconomic History    Marital status: /Civil Union     Spouse name: Not on file    Number of children: Not on file    Years of education: Not on file    Highest education level: Not asked   Occupational History    Occupation: disabled   Tobacco Use    Smoking status: Never    Smokeless tobacco: Never   Vaping Use    Vaping status: Never Used   Substance and Sexual Activity    Alcohol use: Never    Drug use: No    Sexual activity: Not Currently     Partners: Female     Comment: defer   Other Topics Concern    Not on file   Social History Narrative    Daily caffeine consumption 2-3 servings a day     Social Drivers of Health     Financial Resource Strain: Patient Declined (7/13/2023)    Overall Financial Resource Strain (CARDIA)     Difficulty of Paying Living Expenses: Patient declined   Food Insecurity: No Food Insecurity (12/19/2024)    Hunger Vital Sign     Worried About Running Out of Food in the Last Year: Never true     Ran Out of Food in the Last Year: Never true   Transportation Needs: No  "Transportation Needs (12/19/2024)    PRAPARE - Transportation     Lack of Transportation (Medical): No     Lack of Transportation (Non-Medical): No   Physical Activity: Not on file   Stress: No Stress Concern Present (1/18/2019)    [employer] - Occupational Stress Questionnaire     Feeling of Stress : Only a little   Social Connections: Unknown (1/18/2019)    Social Connection and Isolation Panel [NHANES]     Frequency of Communication with Friends and Family: Not on file     Frequency of Social Gatherings with Friends and Family: Not on file     Attends Rastafarian Services: Not on file     Active Member of Clubs or Organizations: Not on file     Attends Club or Organization Meetings: Not on file     Marital Status:    Intimate Partner Violence: Not At Risk (1/18/2019)    Humiliation, Afraid, Rape, and Kick questionnaire     Fear of Current or Ex-Partner: No     Emotionally Abused: No     Physically Abused: No     Sexually Abused: No   Housing Stability: Low Risk  (12/19/2024)    Housing Stability Vital Sign     Unable to Pay for Housing in the Last Year: No     Number of Times Moved in the Last Year: 0     Homeless in the Last Year: No        Review of Systems   Constitutional:  Negative for chills and fever.   Respiratory:  Negative for cough and shortness of breath.    Cardiovascular:  Negative for chest pain.   Gastrointestinal:  Negative for nausea and vomiting.   Neurological:  Positive for numbness. Negative for weakness.         Objective:              Ht 5' 9\" (1.753 m)   Wt 94.8 kg (209 lb)   BMI 30.86 kg/m²     Physical Exam  Nursing note reviewed.   Constitutional:       General: He is not in acute distress.     Appearance: He is not toxic-appearing or diaphoretic.   Cardiovascular:      Rate and Rhythm: Normal rate and regular rhythm.      Pulses: Pulses are weak.           Dorsalis pedis pulses are 0 on the right side and 1+ on the left side.        Posterior " tibial pulses are 0 on the right side and 0 on the left side.   Pulmonary:      Effort: Pulmonary effort is normal. No respiratory distress.   Musculoskeletal:         General: Deformity present. No signs of injury.      Right lower leg: Edema present.      Left lower leg: Edema present.      Right foot: No Charcot foot or foot drop.      Left foot: No Charcot foot or foot drop.      Comments: S/P partial amp right 3rd toe.   Feet:      Right foot:      Skin integrity: Dry skin present. No ulcer.      Left foot:      Skin integrity: Dry skin present. No ulcer.      Comments: Biphasic PTA left.  Skin:     General: Skin is warm and dry.      Capillary Refill: Capillary refill takes less than 2 seconds.      Coloration: Skin is not cyanotic or mottled.      Findings: No abscess, erythema or wound.      Nails: There is no clubbing.      Comments: Thick, mycotic nails X 7 with onycholysis and discoloration.  HPK in left posterior heel.     Neurological:      General: No focal deficit present.      Mental Status: He is alert and oriented to person, place, and time.      Cranial Nerves: No cranial nerve deficit.      Sensory: Sensory deficit present.      Motor: No weakness.      Coordination: Coordination normal.   Psychiatric:         Mood and Affect: Mood normal.         Behavior: Behavior normal.         Thought Content: Thought content normal.         Judgment: Judgment normal.         Diabetic Foot Exam    Patient's shoes and socks removed.    Right Foot/Ankle   Right Foot Inspection  Skin Exam: skin intact and dry skin. No pre-ulcer and no ulcer.     Toe Exam: right toe deformity. No swelling, no tenderness and erythema    Sensory   Proprioception: intact  Monofilament testing: diminished    Vascular  Capillary refills: < 3 seconds  The right DP pulse is 0. The right PT pulse is 0.     Left Foot/Ankle  Left Foot Inspection  Skin Exam: skin intact and dry skin. No pre-ulcer and no ulcer.     Toe Exam: left toe  deformity. No swelling, no tenderness and no erythema.     Sensory   Proprioception: intact  Monofilament testing: diminished    Vascular  Capillary refills: < 3 seconds  The left DP pulse is 1+. The left PT pulse is 0.     Assign Risk Category  Deformity present  Loss of protective sensation  Weak pulses  Risk: 3

## 2025-01-29 ENCOUNTER — APPOINTMENT (OUTPATIENT)
Facility: CLINIC | Age: 65
End: 2025-01-29
Payer: MEDICARE

## 2025-01-29 LAB
ATRIAL RATE: 70 BPM
P AXIS: 78 DEGREES
PR INTERVAL: 218 MS
QRS AXIS: 244 DEGREES
QRSD INTERVAL: 170 MS
QT INTERVAL: 494 MS
QTC INTERVAL: 533 MS
T WAVE AXIS: 50 DEGREES
VENTRICULAR RATE: 70 BPM

## 2025-01-30 ENCOUNTER — APPOINTMENT (OUTPATIENT)
Facility: CLINIC | Age: 65
End: 2025-01-30
Payer: MEDICARE

## 2025-01-30 ENCOUNTER — EVALUATION (OUTPATIENT)
Facility: CLINIC | Age: 65
End: 2025-01-30
Payer: MEDICARE

## 2025-01-30 DIAGNOSIS — R26.89 POOR BALANCE: Primary | ICD-10-CM

## 2025-01-30 DIAGNOSIS — R53.81 PHYSICAL DECONDITIONING: ICD-10-CM

## 2025-01-30 PROCEDURE — 97164 PT RE-EVAL EST PLAN CARE: CPT | Performed by: PHYSICAL THERAPIST

## 2025-01-30 NOTE — PROGRESS NOTES
PT Re-Evaluation          POC expires Unit limit Auth Expiration date PT/OT + Visit Limit? Co-Insurance      [place] primary, 30pcy secondary Yes                                            Today's date: 2025  Patient name: Asad Galloway  : 1960  MRN: [id number]  Referring provider: No ref. provider found  Dx:   Encounter Diagnosis     ICD-10-CM    1. Poor balance  R26.89       2. Physical deconditioning  R53.81                 Assessment  Assessment details: Patient is a 64 y.o. male who presents to skilled PT for impaired balance, general deconditioning with multiple comorbidiites including hx of CVA, CKD on HD. Patient recently discharged on  from this facility to transition to home PT instead. He was hospitalized on  for RLE DVT and initiated home PT. Pt and family want to transition him back to outpatient PT for more intensive PT. He is known to this clinic over the past few months and had inconsistent attendance and difficulty fully participating in PT sessions, many bouts of crying due to his current state. Pt is also quite sedentary at home and does not perform HEP outside of therapy sessions which will limit his progress. Pt's wife verbalized understanding but pt requires reinforcement. Pt's wife reports multiple falls at home despite family members being with him at all times. Advised pt's wife to purchase gait belt at this time for safety. He demonstrates decline in 5x STS, TUG , TUG cog, and Cabrera since last re-evaluation in November. His Cabrera score significantly declined to 14/56. He remains high fall risk per all outcome measures below. He requires mod A for sit>stand, and CG for all standing/walking. He presents with RW today, when previously he was able to use SPC. Plan is to resume outpatient PT for 1 month with consistent therapist to assess if he can tolerate. Pt and wife both verbalized understanding. Also provided  pt with resources for [facility] psychiatric associates and [place] depression hotline due to pt reporting feeling depressed; pt and wife both open to mental health services and plan to follow up with appropriate referrals.       Please contact me if you have any questions or recommendations. Thank you for the referral and the opportunity to share in Asad Galloway's care.      Cut off score   All date taken from APTA Neuro Section or Rehab Measures    BURGER test: 46/56                                              5 x STS Test:  MDC: 6 points                                                  MDC: 2.3 seconds   age norms                                                                 Age Norms   60-69 year old = M: 55, F: 55                        60-69 year old: 11.4 seconds   70-79 year old = M 54,  F: 53                       70-79 year old: 12.6 seconds     80-89 year old = M53,   F: 50                       80-89 year old: 14.8 seconds      TUG test:                                                                     10 Meter Walk Test:  MDC: 4.14 seconds       MDC: .59 ft/sec  Cut off score for Falls                                                  Age Norms  > 13.5 seconds community dwelling adults                20-29; M: 4.56 ft/sec F: 4.62 ft/sec  > 32.2 Frail Elderly                                                     30-39: M 4.76 ft/sec  F: 4.68 ft/sec          40-49: M: 4.79 ft/sec  F: 4.62 ft/sec  6 Minute Walk Test      50-59: M: 4.76 ft/sec  F: 4.56 ft/sec  MDC: 190 feet       60-69: M: 4.56 ft/sec  F: 4.26 ft/sec  Age Norms       70-+    M: 4.36 ft/sec  F: 4.16 ft/sec  60-69:    M: 1876 F: 1765  70-79:    M: 1729 F: 1545    FGA:  80-89 +: M: 1368 F; 1286       MCID: 4 points            Geriatrics/ Community Dwelling Older Adults: </= 22/30 fall risk            Geriatrics/ Community Dwelling Older Adults: </= 20/30 unexplained falls in the next 6 months            Parkinsons: </= 18/30 fall  risk      Patient verbalized understanding of POC          Impairments: Abnormal coordination, Abnormal gait, Abnormal muscle tone, Abnormal or restricted ROM, Activity intolerance, Impaired balance, Impaired physical strength, Lacks appropriate HEP, Poor posture, Poor body mechanics, Pain with function, Safety issue, Weight-bearing intolerance, Abnormal movement, Difficulty understanding, Abnormal muscle firing  Understanding of Dx/Px/POC: Excellent  Prognosis: Excellent    Patient verbalized understanding of POC.         Please contact me if you have any questions or recommendations. Thank you for the referral and the opportunity to share in Asad Galloway's care.        Plan  Plan details: complete testing, balance and functional strength/endurance re-training  Patient would benefit from: PT Eval and Skilled PT  Planned modality interventions: Biofeedback, Cryotherapy, TENS, Thermotherapy  Planned therapy interventions: Abdominal trunk stabilization, ADL training, Balance, Balance/WB training, Breathing training, Body mechanics training, Coordination, Functional ROM exercises, Gait training, HEP, Joint Mobilization, Manual Therapy, Griggs taping, Motor coordination training, Neuromuscular re-education, Patient education, Postural training, Strengthening, Stretching, Therapeutic activities, Therapeutic exercises, Therapeutic training, Transfer training, Activity modification, Work reintegration  Frequency: 2-3x/week  Duration in weeks: 12  Plan of Care beginning date: 1/30/25  Plan of Care expiration date: 12 weeks - 4/24/25  Treatment plan discussed with: Patient       Goals  Short Term Goals (4 weeks):    - Patient will improve time on TUG by 2.9 seconds to facilitate improved safety in all ambulation  - Patient will be independent in basic HEP 2-3 weeks  - Patient will improve 5xSTS score by 2.3 seconds to promote improved LE functional strength needed for ADLs  - Patient will perform 6MWT to assess  cardiovascular endurance and improve ability to ambulate for prolonged periods of time.  - Patient will perform sit to stand with min A x 1.       Long Term Goals (12 weeks):  - Patient will be independent in a comprehensive home exercise program  - Patient will improve gait speed by 0.18 m/s to improve safety with community ambulation  - Patient will improve BURGER by 6 points in order to improve static balance and reduce risk for falls  - Patient will improve 6 Minute Walk Test score by 190 feet to promote improved cardiovascular endurance  - Patient will report 50% reduction in near falls in order to improve safety with functional tasks and reduce his risk for falls  - Patient will report going on walks at least 3 days per week to promote independence and improved cardiovascular endurance  - Patient will be able to ascend/descend stairs reciprocally with 1 UE assist to promote independence and safety with ADL  - Patient will report 50% reduction in near falls when ambulating on uneven terrain  - Patient will be able to perform sit to stand with CG.   - Patient will be able to ambulate 150 ft with SPC and CG.       Cut off score    All date taken from APTA Neuro Section or Rehab Measures      Burger/56  MDC: 6 pts  Age Norms:  60-69: M - 55   F - 55  70-79: M - 54   F - 53  80-89: M - 53   F - 50 5xSTS: June et al 2010  MDC: 2.3 sec  Age Norms:  60-69: 11.4 sec  70-79: 12.6 sec  80-89: 14.8 sec   TUG  MDC: 4.14 sec  Cut off score:  >13.5 sec community dwelling adults  >32.2 frail elderly  <20 I for basic transfers  >30 dependent on transfers 10 Meter Walk Test: Joyce et al 2011  MDC: 0.18 m/s  20-29: M - 1.35 m   F - 1.34 m  30-39: M - 1.43 m   F - 1.34 m  40-49: M - 1.43 m   F - 1.39 m  50-59: M - 1.43 m   F - 1.31 m  60-69: M - 1.34 m   F - 1.24 m  70-79: M - 1.26 m   F - 1.13 m  80-89: M - 0.97 m   F - 0.94 m    Household Ambulator < 0.4 m/s  Limited Community Ambulator 0.4 - 0.8 m/s  Community  Ambulator 0.8 - 1.2 m/s  Safely cross the street > 1.2 m/s   FGA  MCID: 4 pts  Geriatrics/community < 22/30 fall risk  Geriatrics/community < 20/30 unexplained falls    DGI  MDC: vestibular - 4 pts  MDC: geriatric/community - 3 pts  Falls risk <19/24 mCTSIB  Norm: 20-60 yrs  Eyes open firm: norm sway 0.21-0.48  Eyes closed firm: norm sway 0.48-0.99  Eyes open foam: norm sway 0.38-0.71  Eyes closed foam: norm sway 0.70-2.22   6 Minute Walk Test  MDC: 190.98 ft  MCID: 164 ft    Age Norms  60-69: M - 1876 ft (571.80 m)  F - 1765 ft (537.98 m)  70-79: M - 1729 ft (527.00 m)  F - 1545 ft (470.92 m)  80-89: M - 1368 ft (416.97 m)  F - 1286 ft (391.97 m) ABC: Smith & Tre, 2003  <67% increased risk for falls   Gardiner-Laury Monofilaments  Evaluator Size:        Force (grams):          Hand/Dorsal Thresholds:        Plantar Thresholds:  - 1.65                       - 0.008                       - Normal                                 - Normal  - 2.36                       - 0.02                         - Normal                                 - Normal  - 2.44                       - 0.04                         - Normal                                 - Normal  - 2.83                       - 0.07                         - Normal                                 - Normal  - 3.22                       - 0.16                         - Diminished light touch          - Normal  - 3.61                       - 0.40                         - Diminished light touch          - Normal  - 3.84                       - 0.60                         - Diminished protective           - Diminished light touch  - 4.08                       - 1.00                         - Diminished protective           - Diminished light touch  - 4.17                       - 1.40                         - Diminished protective           - Diminished light touch  - 4.31                       - 2.00                         - Diminished protective            - Diminished light touch  - 4.56                       - 4.00                         - Loss of protective sense      - Diminished protective  - 4.74                       - 6.00                         - Loss of protective sense      - Diminished protective  - 4.93                       - 8.00                         - Loss of protective sense      - Diminished protective  - 5.07                       - 10.0                         - Loss of protective sense     - Loss of protective sense  - 5.18                       - 15.0                         - Loss of protective sense     - Loss of protective sense  - 5.46                       - 26.0                         - Loss of protective sense     - Loss of protective sense  - 5.88                       - 60.0                         - Loss of protective sense     - Loss of protective sense  - 6.10                       - 100                          - Loss of protective sense     - Loss of protective sense  - 6.45                       - 180                          - Loss of protective sense     - Loss of protective sense  - 6.65                       - 300                          - Deep pressure sense only  - Deep pressure sense only         Subjective    History of Present Illness  - Mechanism of injury: Patient was being tx at [facility] OP location; transitioned to [facility] for neuro/balance PT and neuro OT. He has CKD on HD M, W, Fx 5 years ; recent visit to ED 8/23 fistula bleeding but resolved. He  has a past medical history of Cerebrovascular accident (CVA) due to thrombosis of left middle cerebral artery (HCC) (07/29/2018), Chronic kidney disease, Coronary artery disease, Diabetes mellitus (HCC), Dialysis patient (HCC), Fistula, GERD (gastroesophageal reflux disease), History of coronary artery stent placement, Hypercholesteremia, Hyperlipidemia, Hypertension, Infectious viral hepatitis, Limb alert care status, Neuropathy, Obesity, Osteomyelitis  "(HCC), PVC's (premature ventricular contractions), Stroke (HCC), TIA (transient ischemic attack) (10/28/2018). He reports impaired balance and increased reliance on family for assist with ADLs; he wants to improve balance and independence    Update 10/10/24: Patient reports balance is improving with PT. He feels stronger    Updated 24: Patient reports that therapy has been \"beautiful.\" \"Everything has gotten better.\"     Updated 25: Pt was discharged from outpatient PT on  with plans to initiate home PT. He went to the hospital on  diagnosed with RLE DVT and discharged home with home PT. Pt's wife reports home PT just had him walking, not really doing many other exercises or balance interventions. They return now for outpatient PT to receive more intensive therapy. Pt's wife reports he is falling every day and she cannot keep picking him up. They want his walking to get better. He always has supervision/assist at home, if his wife is at work his adult children stay with him.     - Primary AD: RW  - Assist level at home: family assists him with ADLs  - Decreased fine motor tasks: No    Patient goal:  \"I want everything better \"     Pain  - Current pain ratin/10  - At best pain ratin/10  - At worst pain ratin/10  - Location: low back  - Aggravating factors: unsure     Social Support  - Steps to enter house: 3  - Stairs in house: yes but bedroom on 1st floor    - Lives in: multi story house   - Lives with: wife and 2 kids     - Employment status: retired   - Hand dominance: right    Treatments  - Previous treatment: outpatient neuro PT at this site  - Current treatment: re-initiating PT eval/ tx        Objective     LE MMT  - R Hip Flexion: 3+/5  L Hip Flexion: 4-/5  - R Hip Abduction: 4-/5  L Hip Abduction: 4-/5  - R Hip Adduction: 4-/5  L Hip Adduction: 4-/5  - R Knee Extension: 4-/5  L Knee Extension: 4-/5  - R Ankle DF: 4/5   L Ankle DF: 4/5  - R Ankle PF: 4-/5   L Ankle PF: " 4/5    Sensation  - Light touch: intact     Coordination  - Heel to Shin: impaired B/L  - Alternate Toe Taps: impaired B/L      Gait  - Abnormalities: ambulates without AD on eval, demos narrow JOSE DAVID, inconsistent step length, lateral instability         Outcome Measures Initial Eval  9/5/24 Re-eval  10/10/24 Re eval   11/7/24 Re-eval  1/30/25     5xSTS 15.85 sec, 2 UE  15.41 sec, 2 UE  21.06 sec, pushing off thighs  46.97 sec pushing off thighs + mod A     TUG  - Regular  - Cognitive   17.3 sec, no AD  24.3 sec, (serial 3s)   12.59 sec, no AD  18.56 sec, no AD (serial 3s)   16. 44 sec, pushing off thighs  19.74 sec, serial 3s   22.11 sec with RW    27.53 sec with RW, serial 3s     10 meter   1.18 m/s without AD   0.86 m/s with cane   0.71 m/s wo cane       BURGER 30/56 33/56 34/56 14/56     mCTSIB  - FTEO (firm)  - FTEC (firm)  - FTEO (foam)  - FTEC (foam)   30 sec +  30 ++  2 sec  0 sec   30 sec+  30 sec+  3 sec  0 sec   30 sec+  30 sec+  3 sec   0 sec      6MWT 100 ft (attempted but only able to complete 1 minute) 275 ft in 4 minutes  212 ft in 2 minutes w no cane       ABC 47.5% 81.88% 72.5%                          Precautions: falls   Past Medical History:   Diagnosis Date    Cerebrovascular accident (CVA) due to thrombosis of left middle cerebral artery (HCC) 07/29/2018    Chronic kidney disease     Coronary artery disease     Diabetes mellitus (HCC)     not on meds    Dialysis patient (HCC)     M-W-F    Fistula     left upper arm for hemodialyis    GERD (gastroesophageal reflux disease)     History of coronary artery stent placement     x3    Hypercholesteremia     Hyperlipidemia     Hypertension     Infectious viral hepatitis     B as child    Limb alert care status     no BP/IV left arm    Neuropathy     Nonhealing ulcer of heel (HCC) 04/25/2023    Obesity     Osteomyelitis (HCC)     last assessed 11/4/16-per son not currently    Penetrating foot wound, left, initial encounter 01/19/2022    PVC's (premature  ventricular contractions)     sees  Cardio    Stroke ([facility])     last weeof July 2018 [facility]    TIA (transient ischemic attack) 10/28/2018    Ulcer of left heel, limited to breakdown of skin ([facility]) 06/13/2024    Wears dentures     Wears glasses

## 2025-02-03 ENCOUNTER — TELEPHONE (OUTPATIENT)
Age: 65
End: 2025-02-03

## 2025-02-03 NOTE — TELEPHONE ENCOUNTER
[facility] did not receive signed order she provided different fax #.    Thank you!!

## 2025-02-03 NOTE — TELEPHONE ENCOUNTER
Signed forms need to go to [facility] Fax # [phone]  Home Health certification and plan of care  Add on discipline for PT & OT  Physician order for Med Change  Physician order for  assessment  Face To Face  Please fax all signed orders by Mallorie Ulloa and fax it back to      Fax# [phone]

## 2025-02-04 ENCOUNTER — TELEMEDICINE (OUTPATIENT)
Dept: CARDIOLOGY CLINIC | Facility: CLINIC | Age: 65
End: 2025-02-04
Payer: MEDICARE

## 2025-02-04 ENCOUNTER — APPOINTMENT (OUTPATIENT)
Dept: LAB | Facility: CLINIC | Age: 65
End: 2025-02-04
Payer: MEDICARE

## 2025-02-04 ENCOUNTER — TELEPHONE (OUTPATIENT)
Age: 65
End: 2025-02-04

## 2025-02-04 DIAGNOSIS — Z79.02 ANTIPLATELET OR ANTITHROMBOTIC LONG-TERM USE: Primary | ICD-10-CM

## 2025-02-04 DIAGNOSIS — I50.22 CHRONIC SYSTOLIC HEART FAILURE (HCC): ICD-10-CM

## 2025-02-04 DIAGNOSIS — I10 PRIMARY HYPERTENSION: ICD-10-CM

## 2025-02-04 DIAGNOSIS — I25.10 CAD, MULTIPLE VESSEL: ICD-10-CM

## 2025-02-04 DIAGNOSIS — Z76.82 PRE-KIDNEY TRANSPLANT, LISTED: ICD-10-CM

## 2025-02-04 DIAGNOSIS — N18.6 ESRD ON DIALYSIS (HCC): ICD-10-CM

## 2025-02-04 DIAGNOSIS — I25.5 ISCHEMIC CARDIOMYOPATHY: ICD-10-CM

## 2025-02-04 DIAGNOSIS — I35.0 NONRHEUMATIC AORTIC VALVE STENOSIS: ICD-10-CM

## 2025-02-04 DIAGNOSIS — E78.2 MIXED HYPERLIPIDEMIA: ICD-10-CM

## 2025-02-04 DIAGNOSIS — Z95.5 STATUS POST INSERTION OF DRUG ELUTING CORONARY ARTERY STENT: ICD-10-CM

## 2025-02-04 DIAGNOSIS — Z99.2 ESRD ON DIALYSIS (HCC): ICD-10-CM

## 2025-02-04 DIAGNOSIS — Z79.02 ANTIPLATELET OR ANTITHROMBOTIC LONG-TERM USE: ICD-10-CM

## 2025-02-04 PROCEDURE — 36415 COLL VENOUS BLD VENIPUNCTURE: CPT

## 2025-02-04 PROCEDURE — 99214 OFFICE O/P EST MOD 30 MIN: CPT | Performed by: INTERNAL MEDICINE

## 2025-02-04 NOTE — TELEPHONE ENCOUNTER
As per  it is fine to change the visit to virtual, called Sam (patient son) informed him that the visit is going to be virtual, patient son is aware and understood.

## 2025-02-04 NOTE — PROGRESS NOTES
[facility]    Name:Asda Galloway   DOS: 2/14/2025     Chief Complaint:   Chief Complaint   Patient presents with    Follow-up       HISTORY OF PRESENT ILLNESS:    HPI:  Asad Galloway is a 64 y.o. male. He  has a past medical history of Cerebrovascular accident (CVA) due to thrombosis of left middle cerebral artery (HCC) (07/29/2018), Chronic kidney disease, Coronary artery disease, Diabetes mellitus ([facility]), Dialysis patient ([facility]), Fistula, GERD (gastroesophageal reflux disease), History of coronary artery stent placement, Hypercholesteremia, Hyperlipidemia, Hypertension, Infectious viral hepatitis, Limb alert care status, Neuropathy, Nonhealing ulcer of heel ([facility]) (04/25/2023), Obesity, Osteomyelitis ([facility]), Penetrating foot wound, left, initial encounter (01/19/2022), PVC's (premature ventricular contractions), Stroke ([facility]), TIA (transient ischemic attack) (10/28/2018), Ulcer of left heel, limited to breakdown of skin ([facility]) (06/13/2024), Wears dentures, and Wears glasses.    Active issues:  Ischemic cardiomyopathy  Chronic HF with mid-range EF (partially recovered to 45-50%) - Lowest EF 25% 1/30/2023.  Complex coronary artery disease - S/P multiple stents with history of in-stent stenosis and thrombosis on Plavix. Also with reported history of stent complications on Ticagrelor, although records are not available to support that. Has been maintained on Prasugrel despite history of CVA.   Moderate aortic stenosis  ESRD on HD - History of angina limiting dialysis sessions before his beta blocker was optimized.   History of DVT - Now chronically on Eliquis.   CVA     I first met Mr. Galloway in January 2023. He was admitted January 29, 2023. Prior to this, he had historically followed with a Cardiologist in [place] at [facility]. He was admitted in 1/2023 due to sudden onset of shortness of breath also with chest pressure/pain.  He was in acute respiratory failure requiring BiPAP in the ED and was  eventually transferred to the ICU for management of pulmonary edema. He underwent volume removal with renal replacement therapy. He was also found to have high sensitivity troponins at that time. An echocardiogram at that time showed reduction in LVEF to 25 to 30%.  Last known EF prior to this was reportedly normal.  He was transferred to our [facility] to undergo cardiac catheterization on 2/1/23, and was found to have progression of coronary artery disease compared to cardiac catheterization in May 2022.  He had in-stent stenosis of the LAD, for which he received PCI and placement of a ADE. He was also found to have an occluded OM stent (placed 5/2022), which could not be wired thus unable to revascularize.  Also with occlusion of the RPAV branch. He was initiated on medical therapy for heart failure, and discharged with a LifeVest. Subsequent echocardiogram showed improvement in LVEF with echo in February 2023 at [facility] showing EF of 35 to 40%.  Repeat echo within our network on 4/24/2023 showed EF of 35%. He was referred EP for ICD evaluation, but this was ultimately deferred due to high risk of infection in the setting of hemodialysis and possible immunosuppression when he gets renal transplant. Lower extremity osteomyelitis also raised concern for a device infection.     GDMT presently consists of Toprol XL 50mg BID, and Entresto 24-26mg once daily.      EKG 7/17/2024  Sinus rhythm with 1st degree A-V block  Right bundle branch block ( ms)     Cardiac Catheterization 2/1/23    1st Mrg lesion is 100% stenosed.    RPAV lesion is 100% stenosed.    Mid LAD lesion is 80% stenosed.    RPDA lesion is 60% stenosed.    The angioplasty was pre-stent angioplasty.    The angioplasty was pre-stent angioplasty.     Multivessel CAD with marked progression since the prior angiographic study in May 2022.  Stents placed in the mid LAD exhibited significant discrete and-stent restenosis.   The first OM branch, stented in 5/22, has become occluded.  The distal RCA beyond the PDA has akso become occluded.  LAD in--stent restenosis was interrogated by IVUS imaging and successfully treated with placement of a 4.0x13mm Orsiro ADE.  An attempt was made to wire the occluded OM branch, but the wire could not be advanced beyond the stent in mid vessel.      Nuclear Stress 4/14/2022    Stress ECG: A pharmacological stress test was performed using regadenoson. The patient experienced no angina during the test.    Stress ECG: The stress ECG is negative for ischemia after pharmacologic stress.    Perfusion: There is a left ventricular perfusion defect that is medium in size with severe reduction in uptake present in the basal to mid inferior and inferolateral location(s) consistent with prior scar.    Stress Function: Left ventricular function post-stress is normal. Post-stress ejection fraction is 46 %. There is a defect in the basal to mid inferior and inferolateral location(s).    Stress Combined Conclusion: Findings are consistent with inferolateral infarction. There is mild reversibility to this defect, suggesting a small amount of mona-infarct ischemia in the affected territories.      Today, he is being evaluated in follow-up via telemedicine.  The patient was scheduled to come in office, but due to family obligations was unable to have a ride to come into the office.  He tells me that he has been doing very well overall, with no active cardiopulmonary complaints.  Has been following also with a cardiac team in [place], and has seen a structural cardiologist there.  He is presently undergoing TAVR evaluation so that he may receive a TAVR prosthesis for his moderate aortic stenosis prior to being enlisted for renal transplant.  This workup overall has been recommended by his transplant team, due to concerns that he may potentially risk his future transplant kidney if he were to undergo TAVR in the  future.    He specifically denies chest pain, shortness of breath, diaphoresis, dizziness, palpitations, orthopnea, edema.  He has had no syncope. No recent falls.  He is tolerating all medicines with no adverse effects.  Tolerating dialysis well.    At the last office visit, had referred him for P2 Y12 level testing.  This was to ensure adequate P2 Y12 inhibition on prasugrel, particularly given concerns for Plavix nonresponder status.  His P2 Y12 level by this assessment was 265, suggesting an adequate P2 Y12 inhibition.       ROS    ROS: Pertinent positives and negatives as described in History of Present Illness. Remainder of a 14 point review of systems was negative.     No Known Allergies     Current Outpatient Medications on File Prior to Visit   Medication Sig Dispense Refill    aspirin 81 mg chewable tablet Chew 81 mg daily      atorvastatin (LIPITOR) 40 mg tablet TAKE 1 TABLET BY MOUTH DAILY WITH DINNER 90 tablet 1    cinacalcet (SENSIPAR) 60 MG tablet Take 2 tablets (120 mg total) by mouth 3 (three) times a week 24 tablet 0    divalproex sodium (DEPAKOTE SPRINKLE) 125 MG capsule Take 2 capsules (250 mg total) by mouth every 12 (twelve) hours (Patient not taking: Reported on 1/14/2025) 360 capsule 1    metoprolol succinate (TOPROL-XL) 25 mg 24 hr tablet Take 1 tablet (25 mg total) by mouth daily      MIDODRINE HCL PO Take 5 mg by mouth PRN w/ dialysis      prasugrel (EFFIENT) tablet TAKE 1 TABLET (5 MG TOTAL) BY MOUTH DAILY. 90 tablet 1    sacubitril-valsartan (Entresto) 24-26 MG TABS Take 1 tablet by mouth in the morning 30 tablet 5    Sevelamer Carbonate 2.4 g PACK VIGOROUSLY MIX CONTENTS OF 1 PACKET IN WATER (IT WILL NOT DISSOLVE) AND DRINK 3 TIMES DAILY WITH MEALS      Vitamin D3 1.25 MG (29895 UT) capsule TAKE 1 CAPSULE BY MOUTH ONE TIME PER WEEK 12 capsule 4     No current facility-administered medications on file prior to visit.       Past Medical History:   Diagnosis Date    Cerebrovascular  accident (CVA) due to thrombosis of left middle cerebral artery (HCC) 07/29/2018    Chronic kidney disease     Coronary artery disease     Diabetes mellitus (HCC)     not on meds    Dialysis patient ([facility])     M-W-F    Fistula     left upper arm for hemodialyis    GERD (gastroesophageal reflux disease)     History of coronary artery stent placement     x3    Hypercholesteremia     Hyperlipidemia     Hypertension     Infectious viral hepatitis     B as child    Limb alert care status     no BP/IV left arm    Neuropathy     Nonhealing ulcer of heel ([facility]) 04/25/2023    Obesity     Osteomyelitis ([facility])     last assessed 11/4/16-per son not currently    Penetrating foot wound, left, initial encounter 01/19/2022    PVC's (premature ventricular contractions)     sees [facility]    Stroke ([facility])     last weeof July 2018 [facility]    TIA (transient ischemic attack) 10/28/2018    Ulcer of left heel, limited to breakdown of skin ([facility]) 06/13/2024    Wears dentures     Wears glasses        Past Surgical History:   Procedure Laterality Date    ABDOMINAL SURGERY      CARDIAC CATHETERIZATION N/A 05/02/2022    Procedure: Cardiac Coronary Angiogram;  Surgeon: Sam Davis MD;  [facility];  Service: Cardiology    CARDIAC CATHETERIZATION N/A 05/02/2022    Procedure: Cardiac pci;  Surgeon: Sam Davis MD;  [facility];  Service: Cardiology    CARDIAC CATHETERIZATION  02/01/2023    Procedure: Cardiac catheterization;  Surgeon: Sam Davis MD;  [facility];  Service: Cardiology    CARDIAC CATHETERIZATION N/A 02/01/2023    Procedure: Cardiac pci;  Surgeon: Sam Davis MD;  [facility];  Service: Cardiology    CARDIAC CATHETERIZATION N/A 02/01/2023    Procedure: Cardiac Coronary Angiogram;  Surgeon: Sam Davis MD;  [facility];  Service: Cardiology    CARDIAC CATHETERIZATION N/A 02/01/2023    Procedure: Cardiac other-IVUS;  Surgeon:  "Sam Davis MD;  [facility];  Service: Cardiology    CHOLECYSTECTOMY      Percutaneous    COLONOSCOPY      CYSTOSCOPY      HEMODIALYSIS ADULT  1/22/2024    HEMODIALYSIS ADULT  1/24/2024    IR LOWER EXTREMITY ANGIOGRAM  9/29/2023    IR LOWER EXTREMITY ANGIOGRAM  2/26/2024    OTHER SURGICAL HISTORY      \"stimulator to control bowel movements\"    MI ESOPHAGOGASTRODUODENOSCOPY TRANSORAL DIAGNOSTIC N/A 09/27/2016    Procedure: ESOPHAGOGASTRODUODENOSCOPY (EGD);  Surgeon: Adele Rowe MD;  Location: [facility] GI [facility];  Service: Gastroenterology    MI LAPAROSCOPY SURG CHOLECYSTECTOMY N/A 02/29/2016    Procedure: LAPAROSCOPIC CHOLECYSTECTOMY ;  Surgeon: Cliff Roman DO;  Location: [facility];  Service: General    MI SLCTV CATHJ 3RD+ ORD SLCTV ABDL PEL/LXTR BRNCH Left 9/29/2023    Procedure: Left leg angiogram with intervention;  Surgeon: Michelle Galvan MD;  Location[facility];  Service: Vascular    MI SLCTV CATHJ 3RD+ ORD SLCTV ABDL PEL/LXTR BRNCH Left 2/26/2024    Procedure: Left leg angiogram antegrade access, left popliteal angioplasty;  Surgeon: Michelle Galvan MD;  Location[facility];  Service: Vascular    ROTATOR CUFF REPAIR Right     SPINAL CORD STIMULATOR IMPLANT      \"Medtronic interstim model # 3058- in lower back to control bowel movements-currently turned off-battery is dead\"    TOE AMPUTATION Right 10/28/2016    Procedure: 3RD TOE AMPUTATION ;  Surgeon: Anjel Salas DPM;  Location: [facility];  Service:        Family History   Problem Relation Age of Onset    Leukemia Mother     Liver disease Mother     Lung cancer Mother         heavy smoker - 3 ppd    Heart disease Father     Liver disease Father     Diabetes type I Father     Multiple myeloma Sister     Breast cancer Sister     Urolithiasis Family     Alcohol abuse Neg Hx     Depression Neg Hx     Drug abuse Neg Hx     Substance Abuse Neg Hx     Mental illness Neg Hx        Social History     Socioeconomic History    " Marital status: /Civil Union     Spouse name: Not on file    Number of children: Not on file    Years of education: Not on file    Highest education level: Not asked   Occupational History    Occupation: disabled   Tobacco Use    Smoking status: Never    Smokeless tobacco: Never   Vaping Use    Vaping status: Never Used   Substance and Sexual Activity    Alcohol use: Never    Drug use: No    Sexual activity: Not Currently     Partners: Female     Comment: defer   Other Topics Concern    Not on file   Social History Narrative    Daily caffeine consumption 2-3 servings a day     Social Drivers of Health     Financial Resource Strain: Patient Declined (7/13/2023)    Overall Financial Resource Strain (CARDIA)     Difficulty of Paying Living Expenses: Patient declined   Food Insecurity: No Food Insecurity (12/19/2024)    Hunger Vital Sign     Worried About Running Out of Food in the Last Year: Never true     Ran Out of Food in the Last Year: Never true   Transportation Needs: No Transportation Needs (12/19/2024)    PRAPARE - Transportation     Lack of Transportation (Medical): No     Lack of Transportation (Non-Medical): No   Physical Activity: Not on file   Stress: No Stress Concern Present (1/18/2019)    [employer] [place] [employer] - Occupational Stress Questionnaire     Feeling of Stress : Only a little   Social Connections: Unknown (1/18/2019)    Social Connection and Isolation Panel [NHANES]     Frequency of Communication with Friends and Family: Not on file     Frequency of Social Gatherings with Friends and Family: Not on file     Attends Holiness Services: Not on file     Active Member of Clubs or Organizations: Not on file     Attends Club or Organization Meetings: Not on file     Marital Status:    Intimate Partner Violence: Not At Risk (1/18/2019)    Humiliation, Afraid, Rape, and Kick questionnaire     Fear of Current or Ex-Partner: No     Emotionally Abused: No     Physically  "Abused: No     Sexually Abused: No   Housing Stability: Low Risk  (12/19/2024)    Housing Stability Vital Sign     Unable to Pay for Housing in the Last Year: No     Number of Times Moved in the Last Year: 0     Homeless in the Last Year: No       OBJECTIVE:    There were no vitals taken for this visit.     BP Readings from Last 3 Encounters:   01/16/25 111/66   01/14/25 118/52   12/23/24 113/56       Wt Readings from Last 3 Encounters:   01/28/25 94.8 kg (209 lb)   01/14/25 94.8 kg (209 lb)   12/19/24 95.3 kg (210 lb)         Physical Exam  Telemedicine visit.  Patient appears alert and oriented, no apparent distress.  Appears to be breathing comfortably.  Limited exam.                                                      LABS:  Lab Results   Component Value Date    GLUCOSE 96 07/17/2024    BUN 20 01/11/2025    CREATININE 4.64 (H) 01/11/2025    CALCIUM 7.8 (L) 01/11/2025     08/10/2016    K 3.9 01/11/2025    CO2 31 01/11/2025    CL 98 (L) 01/11/2025    ALKPHOS 103 01/11/2025    BILITOT 0.6 08/10/2016    PROT 6.7 08/10/2016    AST 4 01/11/2025    ALT 4 01/11/2025    ANIONGAP 9 01/03/2016        Lab Results   Component Value Date    WBC 5.06 12/30/2024    HGB 9.9 (L) 12/30/2024    HCT 30.5 (L) 12/30/2024    MCV 98 12/30/2024     (L) 12/30/2024       Lab Results   Component Value Date    CHOL 203 (H) 08/10/2016    HDL 30 (L) 01/30/2023    LDLCALC 21 01/30/2023    TRIG 123 01/30/2023       Lab Results   Component Value Date    HGBA1C 5.5 11/07/2024       No results found for: \"TSH\"        ASSESSMENT/PLAN:  Diagnoses and all orders for this visit:    Antiplatelet or antithrombotic long-term use  -     [facility] [employer] [facility] Clopidogrel Test; Future    CAD, multiple vessel  -     [facility] [employer] [facility] Clopidogrel Test; Future    Status post insertion of drug eluting coronary artery stent    Primary hypertension    Mixed hyperlipidemia    Ischemic cardiomyopathy    Chronic systolic heart failure " "(HCC)    Nonrheumatic aortic valve stenosis    ESRD on dialysis (HCC)    Pre-kidney transplant, listed    This is a 64-year-old male patient very well-known to me from prior encounters, presenting for follow-up evaluation.  His chronic cardiac conditions are presently stable, he has no anginal complaints and he is clinically euvolemic from a heart failure standpoint, NYHA class II although functional capacity is fairly difficult to ascertain due to baseline ambulatory dysfunction.     I once again spoke to the patient and his son Sam in great detail regarding my management recommendations as they pertain to coronary artery disease, his history of Plavix nonresponder status, ischemic cardiomyopathy with partial recovery of LVEF, prekidney transplant status, and chronic DVT.  We also reviewed in detail once again his aortic stenosis diagnosis which has historically been within the moderate range, with similar findings by transthoracic echo performed in [place] recently as well.    I encouraged them to follow-up with the structural cardiologist they are currently staying in [place] to discuss TAVR candidacy prior to being listed for renal transplantation.    We discussed in detail his P2 Y12 level is not adequately inhibited on prasugrel as demonstrated by the recent lab test, although he is greater than 1 year out from stenting and therefore dual antiplatelet therapy is not technically warranted.  The patient and family have requested [facility] genetic screening for P2 Y12 gene hyper metabolism.  I have reordered this test for the patient.    In the interim, we will continue GDMT with Entresto, metoprolol succinate as outlined.  I have advised follow-up in March on March 4 as already scheduled.      Britney Tan MD [facility]      Portions of the record may have been created with voice recognition software. Occasional wrong word or \"sound alike\" substitutions may have occurred due to the inherent limitations of voice " recognition software. Please review the chart carefully and recognize, using context, where substitutions/typographical errors may have occurred.     Administrative Statements   Encounter provider Britney Tan MD    The Patient is located at Home and in the following state in which I hold an active license [place].    The patient was identified by name and date of birth. Asad Galloway was informed that this is a telemedicine visit and that the visit is being conducted through the Epic Embedded platform. He agrees to proceed..  My office door was closed. No one else was in the room.  He acknowledged consent and understanding of privacy and security of the video platform. The patient has agreed to participate and understands they can discontinue the visit at any time.    I have spent a total time of 30 minutes in caring for this patient on the day of the visit/encounter including Diagnostic results, Prognosis, Risks and benefits of tx options, Instructions for management, and Patient and family education.

## 2025-02-04 NOTE — TELEPHONE ENCOUNTER
Caller: Sam Galloway     Doctor: Dr. Tan     Reason for call: Patient is scheduled for an appointment with Dr. Tan today at 11:20. He is requesting for this to be a virtual visit due to his son not being able to bring him to the visit. Please call the patient to confirm.     Call back#: [phone]

## 2025-02-13 ENCOUNTER — OFFICE VISIT (OUTPATIENT)
Facility: CLINIC | Age: 65
End: 2025-02-13
Payer: MEDICARE

## 2025-02-13 DIAGNOSIS — R26.89 POOR BALANCE: Primary | ICD-10-CM

## 2025-02-13 DIAGNOSIS — R53.81 PHYSICAL DECONDITIONING: ICD-10-CM

## 2025-02-13 LAB
INTERPRETATION METHODS AND LIMITATIONS: NORMAL
Lab: NORMAL

## 2025-02-13 PROCEDURE — 97110 THERAPEUTIC EXERCISES: CPT | Performed by: PHYSICAL THERAPIST

## 2025-02-13 PROCEDURE — 97112 NEUROMUSCULAR REEDUCATION: CPT | Performed by: PHYSICAL THERAPIST

## 2025-02-13 NOTE — PROGRESS NOTES
"Daily Note     Today's date: 2025  Patient name: Asad Galloway  : 1960  MRN: [id number]  Referring provider: Raj Auguste DO  Dx:   Encounter Diagnosis     ICD-10-CM    1. Poor balance  R26.89       2. Physical deconditioning  R53.81               DO NOT OFFER WATER DUE TO FLUID RESTRICTION (dialysis)       Subjective: Pt with no new complaints. Arrives with wife.      Objective: See treatment diary below    - Nustep L1 x 5 min BUE/BLE's  - Sit to stands with 1 airex on seat: 5 reps, 3 sets  - 2MWT: stopped at 1 min 45 sec due to dizziness, walked 170 ft. With no AD and CG    Checked BP manually due to c/o dizziness  /70 mmHg  Resolved with seated rest break    - Sidestepping in // bars 2x down/back (stopped due to dizziness)  - Seated LAQ 3# ankle weights 3x10  - Step ups to 6\" step BHR's: 2x10      Assessment: Pt tolerated treatment fair. Limited standing/walking tolerance due to onset of dizziness; BP normal during session and dizziness resolves with seated rest breaks. Unable to complete full 2MWT due to dizziness. All exercises performed with gait belt and CG. He did well with sit>stands from chair with 1 airex pad; only requiring CG-min A. However without airex pad he requires mod A from standard chair height. Patient would benefit from continued PT to focus on gait ,balance, LE strengthening and overall endurance.       Plan: Continue per plan of care.        POC expires Unit limit Auth Expiration date PT/OT + Visit Limit? Co-Insurance      [employer] primary, 30pcy secondary Yes                                          "

## 2025-02-17 ENCOUNTER — APPOINTMENT (OUTPATIENT)
Facility: CLINIC | Age: 65
End: 2025-02-17
Payer: MEDICARE

## 2025-02-18 ENCOUNTER — OFFICE VISIT (OUTPATIENT)
Facility: CLINIC | Age: 65
End: 2025-02-18
Payer: MEDICARE

## 2025-02-18 DIAGNOSIS — R53.81 PHYSICAL DECONDITIONING: ICD-10-CM

## 2025-02-18 DIAGNOSIS — R26.89 POOR BALANCE: Primary | ICD-10-CM

## 2025-02-18 PROCEDURE — 97112 NEUROMUSCULAR REEDUCATION: CPT | Performed by: PHYSICAL THERAPIST

## 2025-02-18 PROCEDURE — 97110 THERAPEUTIC EXERCISES: CPT | Performed by: PHYSICAL THERAPIST

## 2025-02-18 NOTE — PROGRESS NOTES
"Daily Note     Today's date: 2025  Patient name: Asad Galloway  : 1960  MRN: [id number]  Referring provider: Raj Auguste DO  Dx:   Encounter Diagnosis     ICD-10-CM    1. Poor balance  R26.89       2. Physical deconditioning  R53.81                 DO NOT OFFER WATER DUE TO FLUID RESTRICTION (dialysis)       Subjective: Pt with no new complaints. Arrives with wife.      Objective: See treatment diary below    - Nustep L1 x 6 min BUE/BLE's  - Sit to stands with 1 airex on seat: 5 reps, 3 sets  - Seated LAQ 3# ankle weights 3x10  - Seated ball squeezes 3x10  - Sidestepping in // bars 2x down/back   - Step ups to 6\" step BHR's: 10x each leg      Assessment: Pt tolerated treatment fair. Pt requiring motivation to participate t/o session. Alternated between standing and seated exercises to maximize participation. Increased difficulty with sit to stands today compared to previous session. All exercises performed with gait belt and CG- min A. Patient would benefit from continued PT to focus on gait ,balance, LE strengthening and overall endurance.       Plan: Continue per plan of care.        POC expires Unit limit Auth Expiration date PT/OT + Visit Limit? Co-Insurance      [employer] primary, 30pcy secondary Yes                                          "

## 2025-02-19 ENCOUNTER — OFFICE VISIT (OUTPATIENT)
Dept: URGENT CARE | Facility: CLINIC | Age: 65
End: 2025-02-19
Payer: MEDICARE

## 2025-02-19 VITALS
BODY MASS INDEX: 31.4 KG/M2 | OXYGEN SATURATION: 97 % | RESPIRATION RATE: 18 BRPM | TEMPERATURE: 98.5 F | DIASTOLIC BLOOD PRESSURE: 55 MMHG | HEART RATE: 90 BPM | SYSTOLIC BLOOD PRESSURE: 111 MMHG | HEIGHT: 69 IN | WEIGHT: 212 LBS

## 2025-02-19 DIAGNOSIS — L03.012 PARONYCHIA OF LEFT LITTLE FINGER: Primary | ICD-10-CM

## 2025-02-19 PROCEDURE — G0463 HOSPITAL OUTPT CLINIC VISIT: HCPCS | Performed by: FAMILY MEDICINE

## 2025-02-19 PROCEDURE — 99214 OFFICE O/P EST MOD 30 MIN: CPT | Performed by: FAMILY MEDICINE

## 2025-02-19 RX ORDER — DOXYCYCLINE 100 MG/1
100 TABLET ORAL 2 TIMES DAILY
Qty: 14 TABLET | Refills: 0 | Status: SHIPPED | OUTPATIENT
Start: 2025-02-19 | End: 2025-02-26

## 2025-02-19 NOTE — PROGRESS NOTES
[facility]        NAME: Asad Galloway is a 64 y.o. male  : 1960    MRN: [id number]  DATE: 2025  TIME: 5:24 PM    Assessment and Plan   Paronychia of left little finger [L03.012]  1. Paronychia of left little finger  doxycycline (ADOXA) 100 MG tablet          Paronychia diagnosed on exam today. ABX sent to the pharmacy. Considered hx of diabetes and currently on dialysis. Follow up with PCP in 3-5 days if no improvement. Proceed to ER if symptoms worsen.    Medical Decision Making     PROBLEM: 1 acute complicated injury    DATA: Independent Historian Involved: yes, wife    RISK: Prescription drug management    Chief Complaint     Chief Complaint   Patient presents with   • Hand Pain     Left pinky finger red. Pus came out of it for 2 days.          History of Present Illness     Asad Galloway is a 64 y.o. male presenting to the office today complaining of a skin rash. Symptoms have been present for 2 days, and include left pinky redness and drainage. Patient is on dialysis and has diabetes.     Review of Systems     Review of Systems   Constitutional:  Negative for chills and fever.   HENT:  Negative for congestion and sore throat.    Eyes:  Negative for discharge and itching.   Respiratory:  Negative for cough and wheezing.    Gastrointestinal:  Negative for abdominal pain, nausea and vomiting.   Genitourinary:  Negative for dysuria and flank pain.   Musculoskeletal:  Negative for arthralgias, myalgias and neck pain.   Skin:  Positive for wound.   Allergic/Immunologic: Negative for food allergies.   Neurological:  Negative for light-headedness and headaches.   Psychiatric/Behavioral:  Negative for agitation. The patient is not nervous/anxious.        Current Medications       Current Outpatient Medications:   •  Apixaban Starter Pack 5 MG TBPK, Take-Home Kit dispensed by provider to patient in hospital setting. Use as directed., Disp: , Rfl:   •  aspirin 81 mg chewable tablet, Chew  81 mg daily, Disp: , Rfl:   •  atorvastatin (LIPITOR) 40 mg tablet, TAKE 1 TABLET BY MOUTH DAILY WITH DINNER, Disp: 90 tablet, Rfl: 1  •  cinacalcet (SENSIPAR) 60 MG tablet, Take 2 tablets (120 mg total) by mouth 3 (three) times a week, Disp: 24 tablet, Rfl: 0  •  divalproex sodium (DEPAKOTE SPRINKLE) 125 MG capsule, Take 2 capsules (250 mg total) by mouth every 12 (twelve) hours, Disp: 360 capsule, Rfl: 1  •  doxycycline (ADOXA) 100 MG tablet, Take 1 tablet (100 mg total) by mouth 2 (two) times a day for 7 days, Disp: 14 tablet, Rfl: 0  •  metoprolol succinate (TOPROL-XL) 25 mg 24 hr tablet, Take 1 tablet (25 mg total) by mouth daily, Disp: , Rfl:   •  MIDODRINE HCL PO, Take 5 mg by mouth PRN w/ dialysis, Disp: , Rfl:   •  prasugrel (EFFIENT) tablet, TAKE 1 TABLET (5 MG TOTAL) BY MOUTH DAILY., Disp: 90 tablet, Rfl: 1  •  sacubitril-valsartan (Entresto) 24-26 MG TABS, Take 1 tablet by mouth in the morning, Disp: 30 tablet, Rfl: 5  •  Sevelamer Carbonate 2.4 g PACK, VIGOROUSLY MIX CONTENTS OF 1 PACKET IN WATER (IT WILL NOT DISSOLVE) AND DRINK 3 TIMES DAILY WITH MEALS, Disp: , Rfl:   •  Vitamin D3 1.25 MG (21378 UT) capsule, TAKE 1 CAPSULE BY MOUTH ONE TIME PER WEEK, Disp: 12 capsule, Rfl: 4    Current Allergies     Allergies as of 02/19/2025   • (No Known Allergies)            The following portions of the patient's history were reviewed and updated as appropriate: allergies, current medications, past family history, past medical history, past social history, past surgical history and problem list.     Past Medical History:   Diagnosis Date   • Cerebrovascular accident (CVA) due to thrombosis of left middle cerebral artery (HCC) 07/29/2018   • Chronic kidney disease    • Coronary artery disease    • Diabetes mellitus (HCC)     not on meds   • Dialysis patient ([facility])     M-W-F   • Fistula     left upper arm for hemodialyis   • GERD (gastroesophageal reflux disease)    • History of coronary artery stent placement     x3    • Hypercholesteremia    • Hyperlipidemia    • Hypertension    • Infectious viral hepatitis     B as child   • Limb alert care status     no BP/IV left arm   • Neuropathy    • Nonhealing ulcer of heel (HCC) 04/25/2023   • Obesity    • Osteomyelitis (HCC)     last assessed 11/4/16-per son not currently   • Penetrating foot wound, left, initial encounter 01/19/2022   • PVC's (premature ventricular contractions)     sees  Cardio   • Stroke (HCC)     last weeof July 2018 [facility]   • TIA (transient ischemic attack) 10/28/2018   • Ulcer of left heel, limited to breakdown of skin (HCC) 06/13/2024   • Wears dentures    • Wears glasses        Past Surgical History:   Procedure Laterality Date   • ABDOMINAL SURGERY     • CARDIAC CATHETERIZATION N/A 05/02/2022    Procedure: Cardiac Coronary Angiogram;  Surgeon: Sam Davis MD;  [facility];  Service: Cardiology   • CARDIAC CATHETERIZATION N/A 05/02/2022    Procedure: Cardiac pci;  Surgeon: Sam Davis MD;  [facility];  Service: Cardiology   • CARDIAC CATHETERIZATION  02/01/2023    Procedure: Cardiac catheterization;  Surgeon: Sam Davis MD;  [facility];  Service: Cardiology   • CARDIAC CATHETERIZATION N/A 02/01/2023    Procedure: Cardiac pci;  Surgeon: Sam Davis MD;  [facility];  Service: Cardiology   • CARDIAC CATHETERIZATION N/A 02/01/2023    Procedure: Cardiac Coronary Angiogram;  Surgeon: Sam Davis MD;  [facility]: [facility];  Service: Cardiology   • CARDIAC CATHETERIZATION N/A 02/01/2023    Procedure: Cardiac other-IVUS;  Surgeon: Sam Davis MD;  [facility]: [facility];  Service: Cardiology   • CHOLECYSTECTOMY      Percutaneous   • COLONOSCOPY     • CYSTOSCOPY     • HEMODIALYSIS ADULT  1/22/2024   • HEMODIALYSIS ADULT  1/24/2024   • IR LOWER EXTREMITY ANGIOGRAM  9/29/2023   • IR LOWER EXTREMITY ANGIOGRAM  2/26/2024   • OTHER SURGICAL HISTORY       "\"stimulator to control bowel movements\"   • LA ESOPHAGOGASTRODUODENOSCOPY TRANSORAL DIAGNOSTIC N/A 09/27/2016    Procedure: ESOPHAGOGASTRODUODENOSCOPY (EGD);  Surgeon: Adele Rowe MD;  Location: [facility] GI LAB;  Service: Gastroenterology   • LA LAPAROSCOPY SURG CHOLECYSTECTOMY N/A 02/29/2016    Procedure: LAPAROSCOPIC CHOLECYSTECTOMY ;  Surgeon: Cliff Roman DO;  Location: [facility] Main OR;  Service: General   • LA SLCTV CATHJ 3RD+ ORD SLCTV ABDL PEL/LXTR BRNCH Left 9/29/2023    Procedure: Left leg angiogram with intervention;  Surgeon: Michelle Galvan MD;  Location: [facility] MAIN OR;  Service: Vascular   • LA SLCTV CATHJ 3RD+ ORD SLCTV ABDL PEL/LXTR BRNCH Left 2/26/2024    Procedure: Left leg angiogram antegrade access, left popliteal angioplasty;  Surgeon: Michelle Galvan MD;  Location: [facility] MA[facility];  Service: Vascular   • ROTATOR CUFF REPAIR Right    • SPINAL CORD STIMULATOR IMPLANT      \"Medtronic interstim model # 3058- in lower back to control bowel movements-currently turned off-battery is dead\"   • TOE AMPUTATION Right 10/28/2016    Procedure: 3RD TOE AMPUTATION ;  Surgeon: Anjel Salas DPM;  Location: [facility] Main [facility];  Service:        Family History   Problem Relation Age of Onset   • Leukemia Mother    • Liver disease Mother    • Lung cancer Mother         heavy smoker - 3 ppd   • Heart disease Father    • Liver disease Father    • Diabetes type I Father    • Multiple myeloma Sister    • Breast cancer Sister    • Urolithiasis Family    • Alcohol abuse Neg Hx    • Depression Neg Hx    • Drug abuse Neg Hx    • Substance Abuse Neg Hx    • Mental illness Neg Hx        Medications have been verified.    Objective     /55   Pulse 90   Temp 98.5 °F (36.9 °C)   Resp 18   Ht 5' 9\" (1.753 m)   Wt 96.2 kg (212 lb)   SpO2 97%   BMI 31.31 kg/m²   No LMP for male patient.     Physical Exam     Physical Exam  Vitals reviewed.   Constitutional:       General: He is not in acute distress.     Appearance: " Normal appearance.   HENT:      Head: Normocephalic and atraumatic.   Eyes:      Extraocular Movements: Extraocular movements intact.      Conjunctiva/sclera: Conjunctivae normal.   Pulmonary:      Effort: Pulmonary effort is normal. No respiratory distress.   Musculoskeletal:        Hands:       Comments: Paronychia present with drainage.    Skin:     General: Skin is warm and dry.   Neurological:      General: No focal deficit present.      Mental Status: He is alert.   Psychiatric:         Mood and Affect: Mood normal.         Behavior: Behavior normal.         Thought Content: Thought content normal.         Judgment: Judgment normal.

## 2025-02-20 ENCOUNTER — EVALUATION (OUTPATIENT)
Facility: CLINIC | Age: 65
End: 2025-02-20
Payer: MEDICARE

## 2025-02-20 ENCOUNTER — OFFICE VISIT (OUTPATIENT)
Facility: CLINIC | Age: 65
End: 2025-02-20
Payer: MEDICARE

## 2025-02-20 DIAGNOSIS — R53.81 PHYSICAL DECONDITIONING: ICD-10-CM

## 2025-02-20 DIAGNOSIS — R26.2 AMBULATORY DYSFUNCTION: ICD-10-CM

## 2025-02-20 DIAGNOSIS — Z86.73 HISTORY OF STROKE IN ADULTHOOD: Primary | Chronic | ICD-10-CM

## 2025-02-20 DIAGNOSIS — R53.1 GENERALIZED WEAKNESS: ICD-10-CM

## 2025-02-20 DIAGNOSIS — R26.89 POOR BALANCE: Primary | ICD-10-CM

## 2025-02-20 DIAGNOSIS — R45.86 EMOTIONAL LABILITY: ICD-10-CM
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
## 2025-02-20 DIAGNOSIS — F39 MOOD DISORDER (HCC): Chronic | ICD-10-CM

## 2025-02-20 PROCEDURE — 97110 THERAPEUTIC EXERCISES: CPT | Performed by: PHYSICAL THERAPIST

## 2025-02-20 PROCEDURE — 97530 THERAPEUTIC ACTIVITIES: CPT | Performed by: PHYSICAL THERAPIST

## 2025-02-20 PROCEDURE — 97166 OT EVAL MOD COMPLEX 45 MIN: CPT

## 2025-02-20 NOTE — PROGRESS NOTES
"OCCUPATIONAL THERAPY RE-CERTIFICATION    Today's Date: 2025  Patient Name: Asad Galloway  : 1960  MRN: [id number]  Referring Provider: Mallorie Ulloa*  Dx: History of stroke in adulthood [Z86.73]    SKILLED ANALYSIS:  Pt is a right hand dominant 64 year old male presenting to OP OT after break from services to trial home therapies after experiencing decline in functioning and difficulty with participating in OP services.  Pt has a h/o CVA in 2018 resulting in significant cognitive deficits and severe decline in his functional status.  Since d/c from OT, pt reports improved functional and cognitive status, however is a poor historian.  Based on assessments, decline in grasp and pinch strength, decline in overall functional cognition on MoCA, decline in sustained attention, decline FMC and dexterity.  Pt is demonstrating deficits in the following domains: overall functional cognitive, insight into deficits, safety awareness, sustained and divided attention, direction following, logical and deductive reasoning, b/l UE weakness, FMC, dexterity, grasp strength.  Recommend OP OT 2x/wk for 12 weeks, however educated pt and his wife on importance of pt participating consistently, as well as attendance policy.  If patient is unable to tolerate participation at this frequency, will decrease.  Pt and wife acknowledge understanding.    Educated pt on charges of insurance, acknowledge understanding, and are in agreement.    PLAN OF CARE START: 25  PLAN OF CARE END: 25  FREQUENCY: 2x/week  PRECAUTIONS dialysis 3x/week; NO FOOD OR WATER CAN BE GIVEN TO PATIENT; scared of dogs; do not take BP in L UE    Subjective: \"What if you sent people to my house?\"    Occupational Profile  Pt lives with at home with his wife and two kids (23 and 28), other two kids are  and live on their own. Pt lives in a bi-level home. There are 5 steps down and 7 steps up. Pt resides downstairs. They have a shower " "bench, grab bars, handles on toilet, and railing to assist with getting into and out of bed. Pt is a retired , he has been retired since his stroke. Pt is not driving. Pt wife reports she provides ~80% assistance for all ADLs. Pt wife reports she does all IADLs. Update 2/20/25: pt reporting that he requires assist with bathing but dresses I'ly.  Reports improved functional cognition, no falls in the past month.  This info will need to be confirmed with pt's wife 2* pt being a poor historian.    PATIENT GOAL: \"To get better, to get stronger\"    HISTORY OF PRESENT ILLNESS:   Asad Galloway is a 64 y.o. male patient who originally presented to the hospital on 7/17/2024 due to ambulatory dysfunction after fall.  Patient was admitted to [facility] was discharged with a diagnosis of acute DVT on 07/16.  Patient presented to the emergency department on 07/17 after a mechanical fall at home.  Fall was witnessed.  Patient did not syncopized.  Patient's trauma evaluation was negative in the emergency department.  Patient was noted to have profound weakness with attempts to lift himself off of the toilet.  PT/OT recommended short-term rehab.  Today, wife at bedside is declining short-term rehab.  Family would like to be discharged with outpatient physical therapy/Occupational Therapy.      PMH:   Past Medical History:   Diagnosis Date    Cerebrovascular accident (CVA) due to thrombosis of left middle cerebral artery (HCC) 07/29/2018    Chronic kidney disease     Coronary artery disease     Diabetes mellitus (HCC)     not on meds    Dialysis patient (HCC)     M-W-F    Fistula     left upper arm for hemodialyis    GERD (gastroesophageal reflux disease)     History of coronary artery stent placement     x3    Hypercholesteremia     Hyperlipidemia     Hypertension     Infectious viral hepatitis     B as child    Limb alert care status     no BP/IV left arm    Neuropathy     Nonhealing ulcer of heel (HCC) " "04/25/2023    Obesity     Osteomyelitis (HCC)     last assessed 11/4/16-per son not currently    Penetrating foot wound, left, initial encounter 01/19/2022    PVC's (premature ventricular contractions)     sees  Cardio    Stroke (HCC)     last weeof July 2018 [facility]    TIA (transient ischemic attack) 10/28/2018    Ulcer of left heel, limited to breakdown of skin (HCC) 06/13/2024    Wears dentures     Wears glasses        Past Surgical Hx:   Past Surgical History:   Procedure Laterality Date    ABDOMINAL SURGERY      CARDIAC CATHETERIZATION N/A 05/02/2022    Procedure: Cardiac Coronary Angiogram;  Surgeon: Sam Dvais MD;  [facility];  Service: Cardiology    CARDIAC CATHETERIZATION N/A 05/02/2022    Procedure: Cardiac pci;  Surgeon: Sam Davis MD;  [facility];  Service: Cardiology    CARDIAC CATHETERIZATION  02/01/2023    Procedure: Cardiac catheterization;  Surgeon: Sam Davis MD;  [facility];  Service: Cardiology    CARDIAC CATHETERIZATION N/A 02/01/2023    Procedure: Cardiac pci;  Surgeon: Sam Davis MD;  [facility]: [facility];  Service: Cardiology    CARDIAC CATHETERIZATION N/A 02/01/2023    Procedure: Cardiac Coronary Angiogram;  Surgeon: Sam Davis MD;  [facility];  Service: Cardiology    CARDIAC CATHETERIZATION N/A 02/01/2023    Procedure: Cardiac other-IVUS;  Surgeon: Sam Davis MD;  [facility];  Service: Cardiology    CHOLECYSTECTOMY      Percutaneous    COLONOSCOPY      CYSTOSCOPY      HEMODIALYSIS ADULT  1/22/2024    HEMODIALYSIS ADULT  1/24/2024    IR LOWER EXTREMITY ANGIOGRAM  9/29/2023    IR LOWER EXTREMITY ANGIOGRAM  2/26/2024    OTHER SURGICAL HISTORY      \"stimulator to control bowel movements\"    MT ESOPHAGOGASTRODUODENOSCOPY TRANSORAL DIAGNOSTIC N/A 09/27/2016    Procedure: ESOPHAGOGASTRODUODENOSCOPY (EGD);  Surgeon: Adele Rowe MD;  Location: [facility] GI LAB;  " "Service: Gastroenterology    UT LAPAROSCOPY SURG CHOLECYSTECTOMY N/A 2016    Procedure: LAPAROSCOPIC CHOLECYSTECTOMY ;  Surgeon: Cliff Roman DO;  Location: [facility];  Service: General    UT SLCTV CATHJ 3RD+ ORD SLCTV ABDL PEL/LXTR BRNCH Left 2023    Procedure: Left leg angiogram with intervention;  Surgeon: Michelle Galvan MD;  [facility]: [facility];  Service: Vascular    UT SLCTV CATHJ 3RD+ ORD SLCTV ABDL PEL/LXTR BRNCH Left 2024    Procedure: Left leg angiogram antegrade access, left popliteal angioplasty;  Surgeon: Michelle Galvan MD;  Location: [facility];  Service: Vascular    ROTATOR CUFF REPAIR Right     SPINAL CORD STIMULATOR IMPLANT      \"Medtronic interstim model # 3058- in lower back to control bowel movements-currently turned off-battery is dead\"    TOE AMPUTATION Right 10/28/2016    Procedure: 3RD TOE AMPUTATION ;  Surgeon: Anjel Salas DPM;  Location: [facility];  Service:         Pain: FLACC 0    Objective    Upper Extremities:  Pt is right hand dominant                MARIFER: RUE: 43lb LUE: 26lb  The age norm is approximately 76-89 lbs, indicating decreased  strength.                2 point pinch: RUE: 8.5, LUE: 1.5  3 point pinch: RUE: 7, LUE: 1.5  Lateral pinch: RUE: 12, LUE: 2.5                Range of Motion:   AROM: b/l WNL     Subluxation: none    Scapular winging: none    Spasticity: none     Finger to Nose Testing with Visual Occlusion (proprioception):  mildly dysmetric L UE    Finger to Nose Testing without Visual Occlusion: WNL     Monofilaments/sensory testing: WNL    Functional Cognition:  Highest level of education: High school    Víctor Cognitive Assessment Version 8.2 (MoCA V8.2)  Visuospatial/executive functionin/5  Namin/3  Memory: 1st trial:  , 2nd trial:    Attention/concentration:   List of letters:   Serial Seven Subtraction:  1/3   Language/sentence repetition:  2  Language Fluency:  0/1, 4 " "words  Abstract/Correlational Thinkin/2  Delayed Recall:  0/5  Orientation:  3/6. Oriented to month, date, city.  Reports 2019.  Uses environmental cue for place by attempting to read therapist's shirt, however reads it as \"Physician's travel\".   Memory Index Score: 3/15     Raw Score:  11+1= 12, MIS:  3/15, indicative of MODERATE neurocognitive impairments. Significant decline from last re-eval, during which he scored     MoCA Scoring        Normal: 26+         Mild Cognitive Impairment: 18-25          Moderate Cognitive Impairment: 10-17         Severe Cognitive Impairment: <10      NINE-HOLE PEG TEST: assesses dexterity/fine motor coordination. Alternative scoring: the number of pegs placed in 50 or 100 seconds can be recorded.     R hand: 1:54 with 4 pegs dropped, 3x use of L UE despite cues to discontinue  L hand: 3:06 with 11 pegs dropped, 4x use of R UE despite cues to discontinue     Norms:   Sex Age Average R Average L   Male 61-65 20.87 21.60      Pt demonstrates decreased FMC compared to norms for age/sex       FUNCTIONAL DEXTERITY TEST:   R: 1:24 with 16/16 compensations   L: 1:42 with 3 pegs dropped, 16/16 compensations.     Trail making Part A and Part B:   Part A: 2:33 with 6 cues for sequencing.  Last re-evaluation: 1:28 with 1 mistake corrected by the therapist   Part B: NA 2* being unable to complete smaller version on MoCA    Indicating deficits: Part A deficit > 33.22 seconds for age related norms    Contextual Memory Test:    Immediate: 3/20   Delayed:   GOALS:   Short Term Goals:  Pt will increase B UE  strength by 4 lbs to improve performance with ADLs   Pt will increase R lateral pinch strength by 2 lb to improve performance with ADLs/dressing   Pt will demonstrate improved functional cognition as evidenced by improving score on the MoCA to  to improve performance during ADLs and IADLs   Pt will demonstrate improved sustained attention as evidenced by completing TM " part A within 1 minute with 0 verbal cues.   Pt will demonstrate improved FMC as evidenced by improving time on 9-hole peg test by 8 seconds R UE and completing assessment with L UE within 5 min.    Long Term Goals: 8-12 weeks  Pt will increase B UE  strength by 7 lbs to improve performance with ADLs   Pt will increase R lateral pinch strength by 3 lb to improve performance with ADLs/dressing  Pt will demonstrate improved functional cognition as evidenced by improving score on the MoCA to 16/30 to improve performance with daily activities  Pt will be oriented x4 for at least 3/4 consecutive OT sessions with use of environmental cues as appropriate.  Pt will demonstrate improved FMC as evidenced by improving time on 9-hole peg test by 8 seconds with each hand.   Pt will demonstrate improved FMC as evidenced by improving time on 9-hole peg test by 15 seconds R UE and completing assessment with L UE within 4 min.    OTHER PLANNED THERAPY INTERVENTIONS:   Supine, seated, and in stance neuro re-ed  Tricep AG  NMES/FES  FMC/prehension  Timed Trials  Manual tx  Hand to target  Sensory re-ed  Seated functional reach: crossing midline  Supine place and hold  WBearing strategies   Closed chain activities  Open chain activities  Internal and external memory aides  Multimatrix for saccades/ visual clutter/attention  Hypersensitivity strategies education  Multi-modal environment  Sustained/alternating/divided attention  Tracking tube  Oculomotor control:  saccades, con/divergence  Conv./div. Dynamic tasks  Work stations with timed transitions  Temporal Awareness  Memory and mental manipulation  Auditory processing with immediate recall  Memory retention with immediate and delayed recall  Edu on cog/vision apps

## 2025-02-20 NOTE — PROGRESS NOTES
Daily Note     Today's date: 2025  Patient name: Asad Galloway  : 1960  MRN: [id number]  Referring provider: Raj Auguste DO  Dx:   Encounter Diagnosis     ICD-10-CM    1. Poor balance  R26.89       2. Physical deconditioning  R53.81                   DO NOT OFFER WATER DUE TO FLUID RESTRICTION (dialysis)       Subjective: Pt with no new complaints. Arrives from OT session.      Objective: See treatment diary below    - Nustep L1 x 6 min BUE/BLE's  - Sit to stands with 1 airex on seat: 5 reps  - Seated LAQ 3# ankle weights 3x10    Supine /60 mmHg    - Education re: dizziness, and breathing/grounding techniques to manage      Assessment: Pt tolerated treatment fair. C/o dizziness during session and became tearful/anxious. Checked pt's BP in supine and it was WNL. After education on grounding/breathing and reassurance, symptoms improved. Session ended early. Spoke with his wife who is aware of his dizziness episodes (cardiac related) and says they follow up with MD on . Plan is to trial increased frequency to 3x/week in March however pt may not be able to tolerate, especially with addition of OT. Patient would benefit from continued PT to focus on gait ,balance, LE strengthening and overall endurance.       Plan: Continue per plan of care.  Plan to increase frequency to 3x/week in March.        POC expires Unit limit Auth Expiration date PT/OT + Visit Limit? Co-Insurance      [employer] primary, 30pcy secondary Yes

## 2025-02-25 ENCOUNTER — OFFICE VISIT (OUTPATIENT)
Facility: CLINIC | Age: 65
End: 2025-02-25
Payer: MEDICARE

## 2025-02-25 DIAGNOSIS — R26.89 POOR BALANCE: Primary | ICD-10-CM

## 2025-02-25 DIAGNOSIS — R53.81 PHYSICAL DECONDITIONING: ICD-10-CM

## 2025-02-25 PROCEDURE — 97110 THERAPEUTIC EXERCISES: CPT | Performed by: PHYSICAL THERAPIST

## 2025-02-25 PROCEDURE — 97530 THERAPEUTIC ACTIVITIES: CPT | Performed by: PHYSICAL THERAPIST

## 2025-02-25 NOTE — PROGRESS NOTES
"Daily Note     Today's date: 2025  Patient name: Asad Galloway  : 1960  MRN: [id number]  Referring provider: Raj Auguste DO  Dx:   Encounter Diagnosis     ICD-10-CM    1. Poor balance  R26.89       2. Physical deconditioning  R53.81                     DO NOT OFFER WATER DUE TO FLUID RESTRICTION (dialysis)       Subjective: Pt with no new complaints. Arrives from OT session.      Objective: See treatment diary below    - Recumbent bike x 5 min (frequent foot slipping off pedals)  - Sit to stands with 1 airex on seat: 5 reps, 3 sets  - Seated LAQ 3# ankle weights: 3x10  - Sidestepping in // bars, 3# ankle weights: 2x down/back  - Seated ball squeezes: 3x10  - Stepping over 6\" alexander in // bars, 3# ankle weights: 4x total  - Step ups to 6\" step: 10x      Assessment: Pt tolerated treatment well. No c/o dizziness. Able to alternate between seated and standing exercises. Patient would benefit from continued PT to focus on gait ,balance, LE strengthening and overall endurance.       Plan: Continue per plan of care.  Plan to increase frequency to 3x/week in March as a trial, if not tolerating will reduce to 2x/week.         POC expires Unit limit Auth Expiration date PT/OT + Visit Limit? Co-Insurance      [employer]om[employer] primary, 30pcy secondary Yes                                          "

## 2025-02-27 ENCOUNTER — TELEPHONE (OUTPATIENT)
Age: 65
End: 2025-02-27

## 2025-02-27 ENCOUNTER — APPOINTMENT (OUTPATIENT)
Facility: CLINIC | Age: 65
End: 2025-02-27
Payer: MEDICARE

## 2025-02-27 DIAGNOSIS — I82.451 ACUTE EMBOLISM AND THROMBOSIS OF RIGHT PERONEAL VEIN (HCC): Primary | ICD-10-CM

## 2025-02-27 NOTE — TELEPHONE ENCOUNTER
Spoke with son Sam   Patient has reddness and swelling of right foot  Latest venous duplex report from 1/16/2025 showed evidence of chronic nonocclusive DVT in one of the pair peroneal veins in the mid calf.  There is no change compared to prior Doppler in November 2024 consistent with chronic DVT, not acute as previously speculated.  Patient is off eliquis and hematology has signed off  Patient does not have PCP  Discussed with Dr Bourne  Patient should be seen by PCP or examined in ED as issue with foot may not be related to clotting  Patient's son Sam verbalizes understanding

## 2025-02-27 NOTE — TELEPHONE ENCOUNTER
Received a phone call from patient's son, Sam.  Patient had labs drawn on 2/22 and Platelets=73.  Sam is concerned because patient is taking Prasugrel, which is prescribed by cardiologist.  Patient also had a recent non-occlusive DVT in right lower leg, which was confirmed with a VAS on 1/16.  Sam stated that the right lower leg is now reddened, but patient denies any pain and/or swelling.  Sam is requesting an appointment with [facility].  Please call Sam with any further recommendations.

## 2025-02-27 NOTE — TELEPHONE ENCOUNTER
Doppler ordered however patient should be evaluated since last Doppler on 1/16/25 did not show any acute changes.

## 2025-03-03 ENCOUNTER — APPOINTMENT (OUTPATIENT)
Facility: CLINIC | Age: 65
End: 2025-03-03
Payer: MEDICARE

## 2025-03-04 ENCOUNTER — APPOINTMENT (OUTPATIENT)
Facility: CLINIC | Age: 65
End: 2025-03-04
Payer: MEDICARE

## 2025-03-05 ENCOUNTER — APPOINTMENT (OUTPATIENT)
Facility: CLINIC | Age: 65
End: 2025-03-05
Payer: MEDICARE

## 2025-03-06 ENCOUNTER — TELEPHONE (OUTPATIENT)
Age: 65
End: 2025-03-06

## 2025-03-06 ENCOUNTER — APPOINTMENT (OUTPATIENT)
Facility: CLINIC | Age: 65
End: 2025-03-06
Payer: MEDICARE

## 2025-03-06 NOTE — TELEPHONE ENCOUNTER
Caller: Sam Galloway, son    Doctor: Dr. Tan    Reason for call: He wants Asad to see Dr Tan next week, either Tue 3/11/2025 or Thu 3/13/2025.  He said Dr Tan usually will see him during his lunch. Please call him.    Thank you    Call back#: [phone]

## 2025-03-07 ENCOUNTER — APPOINTMENT (OUTPATIENT)
Facility: CLINIC | Age: 65
End: 2025-03-07
Payer: MEDICARE

## 2025-03-08 DIAGNOSIS — I10 PRIMARY HYPERTENSION: ICD-10-CM

## 2025-03-10 ENCOUNTER — APPOINTMENT (OUTPATIENT)
Facility: CLINIC | Age: 65
End: 2025-03-10
Payer: MEDICARE

## 2025-03-10 RX ORDER — METOPROLOL SUCCINATE 25 MG/1
25 TABLET, EXTENDED RELEASE ORAL 2 TIMES DAILY
Qty: 180 TABLET | Refills: 1 | Status: SHIPPED | OUTPATIENT
Start: 2025-03-10

## 2025-03-11 ENCOUNTER — APPOINTMENT (OUTPATIENT)
Facility: CLINIC | Age: 65
End: 2025-03-11
Payer: MEDICARE

## 2025-03-12 ENCOUNTER — APPOINTMENT (OUTPATIENT)
Facility: CLINIC | Age: 65
End: 2025-03-12
Payer: MEDICARE

## 2025-03-13 ENCOUNTER — APPOINTMENT (OUTPATIENT)
Facility: CLINIC | Age: 65
End: 2025-03-13
Payer: MEDICARE

## 2025-03-14 ENCOUNTER — TELEPHONE (OUTPATIENT)
Dept: OTHER | Facility: OTHER | Age: 65
End: 2025-03-14

## 2025-03-14 ENCOUNTER — APPOINTMENT (OUTPATIENT)
Facility: CLINIC | Age: 65
End: 2025-03-14
Payer: MEDICARE

## 2025-03-14 DIAGNOSIS — R53.1 GENERALIZED WEAKNESS: ICD-10-CM

## 2025-03-14 DIAGNOSIS — F39 MOOD DISORDER (HCC): Chronic | ICD-10-CM

## 2025-03-14 DIAGNOSIS — R45.86 EMOTIONAL LABILITY: ICD-10-CM

## 2025-03-14 DIAGNOSIS — R26.2 AMBULATORY DYSFUNCTION: Primary | ICD-10-CM

## 2025-03-14 NOTE — TELEPHONE ENCOUNTER
"Patient's son stated, \"My father was discharged from the hospital after a Heart procedure to post acute PT and I would like Mallorie Ulloa PA-C would placed a referral to [facility]'s PT.\"    "

## 2025-03-14 NOTE — TELEPHONE ENCOUNTER
Daniella from [facility] called to report a new admission and review orders.     On call provider paged.

## 2025-03-16 ENCOUNTER — TELEPHONE (OUTPATIENT)
Dept: OTHER | Facility: OTHER | Age: 65
End: 2025-03-16

## 2025-03-16 ENCOUNTER — HOSPITAL ENCOUNTER (EMERGENCY)
Facility: HOSPITAL | Age: 65
Discharge: HOME | End: 2025-03-17
Attending: EMERGENCY MEDICINE | Admitting: EMERGENCY MEDICINE
Payer: MEDICARE

## 2025-03-16 DIAGNOSIS — L03.012 PARONYCHIA OF FINGER OF LEFT HAND: ICD-10-CM

## 2025-03-16 DIAGNOSIS — R34 ANURIA: ICD-10-CM

## 2025-03-16 DIAGNOSIS — N18.9 CKD (CHRONIC KIDNEY DISEASE): ICD-10-CM

## 2025-03-16 DIAGNOSIS — D64.9 ANEMIA, UNSPECIFIED TYPE: ICD-10-CM

## 2025-03-16 DIAGNOSIS — R07.9 CHEST PAIN: Primary | ICD-10-CM

## 2025-03-16 PROCEDURE — 99285 EMERGENCY DEPT VISIT HI MDM: CPT

## 2025-03-16 NOTE — TELEPHONE ENCOUNTER
"Tegan, Nurse, stated. \"I would like to speak to the on call regarding Asad's sleep and the wound on his finger.\"    On call notified via secure chat.  "

## 2025-03-17 ENCOUNTER — TELEPHONE (OUTPATIENT)
Age: 65
End: 2025-03-17

## 2025-03-17 ENCOUNTER — APPOINTMENT (OUTPATIENT)
Facility: CLINIC | Age: 65
End: 2025-03-17
Payer: MEDICARE

## 2025-03-17 ENCOUNTER — APPOINTMENT (EMERGENCY)
Dept: RADIOLOGY | Facility: HOSPITAL | Age: 65
End: 2025-03-17
Payer: MEDICARE

## 2025-03-17 ENCOUNTER — NURSING HOME VISIT (OUTPATIENT)
Dept: GERIATRICS | Facility: OTHER | Age: 65
End: 2025-03-17
Payer: MEDICARE

## 2025-03-17 VITALS
SYSTOLIC BLOOD PRESSURE: 115 MMHG | OXYGEN SATURATION: 97 % | DIASTOLIC BLOOD PRESSURE: 56 MMHG | RESPIRATION RATE: 20 BRPM | HEART RATE: 67 BPM | TEMPERATURE: 98 F

## 2025-03-17 DIAGNOSIS — D69.6 THROMBOCYTOPENIA (HCC): ICD-10-CM

## 2025-03-17 DIAGNOSIS — Z99.2 ESRD ON DIALYSIS (HCC): ICD-10-CM

## 2025-03-17 DIAGNOSIS — I50.22 CHRONIC SYSTOLIC HEART FAILURE (HCC): ICD-10-CM

## 2025-03-17 DIAGNOSIS — Z78.9 NEEDS ASSISTANCE WITH COMMUNITY RESOURCES: Primary | ICD-10-CM

## 2025-03-17 DIAGNOSIS — E11.42 DIABETIC POLYNEUROPATHY ASSOCIATED WITH TYPE 2 DIABETES MELLITUS (HCC): ICD-10-CM

## 2025-03-17 DIAGNOSIS — N18.6 ESRD ON DIALYSIS (HCC): ICD-10-CM

## 2025-03-17 DIAGNOSIS — I25.10 CAD, MULTIPLE VESSEL: ICD-10-CM

## 2025-03-17 DIAGNOSIS — R26.2 AMBULATORY DYSFUNCTION: Primary | ICD-10-CM

## 2025-03-17 DIAGNOSIS — F39 MOOD DISORDER (HCC): Chronic | ICD-10-CM

## 2025-03-17 LAB
2HR DELTA HS TROPONIN: -1 NG/L
ALBUMIN SERPL BCG-MCNC: 3.7 G/DL (ref 3.5–5)
ALP SERPL-CCNC: 122 U/L (ref 34–104)
ALT SERPL W P-5'-P-CCNC: 7 U/L (ref 7–52)
ANION GAP SERPL CALCULATED.3IONS-SCNC: 13 MMOL/L (ref 4–13)
AST SERPL W P-5'-P-CCNC: 12 U/L (ref 13–39)
ATRIAL RATE: 71 BPM
BASOPHILS # BLD AUTO: 0.03 THOUSANDS/ÂΜL (ref 0–0.1)
BASOPHILS NFR BLD AUTO: 1 % (ref 0–1)
BILIRUB SERPL-MCNC: 0.69 MG/DL (ref 0.2–1)
BUN SERPL-MCNC: 41 MG/DL (ref 5–25)
CALCIUM SERPL-MCNC: 8.3 MG/DL (ref 8.4–10.2)
CARDIAC TROPONIN I PNL SERPL HS: 53 NG/L (ref ?–50)
CARDIAC TROPONIN I PNL SERPL HS: 54 NG/L (ref ?–50)
CHLORIDE SERPL-SCNC: 97 MMOL/L (ref 96–108)
CO2 SERPL-SCNC: 26 MMOL/L (ref 21–32)
CREAT SERPL-MCNC: 7.33 MG/DL (ref 0.6–1.3)
EOSINOPHIL # BLD AUTO: 0.13 THOUSAND/ÂΜL (ref 0–0.61)
EOSINOPHIL NFR BLD AUTO: 2 % (ref 0–6)
ERYTHROCYTE [DISTWIDTH] IN BLOOD BY AUTOMATED COUNT: 19.1 % (ref 11.6–15.1)
GFR SERPL CREATININE-BSD FRML MDRD: 7 ML/MIN/1.73SQ M
GLUCOSE SERPL-MCNC: 90 MG/DL (ref 65–140)
HCT VFR BLD AUTO: 32.3 % (ref 36.5–49.3)
HGB BLD-MCNC: 10 G/DL (ref 12–17)
IMM GRANULOCYTES # BLD AUTO: 0.03 THOUSAND/UL (ref 0–0.2)
IMM GRANULOCYTES NFR BLD AUTO: 1 % (ref 0–2)
LIPASE SERPL-CCNC: 25 U/L (ref 11–82)
LYMPHOCYTES # BLD AUTO: 0.82 THOUSANDS/ÂΜL (ref 0.6–4.47)
LYMPHOCYTES NFR BLD AUTO: 15 % (ref 14–44)
MCH RBC QN AUTO: 31.1 PG (ref 26.8–34.3)
MCHC RBC AUTO-ENTMCNC: 31 G/DL (ref 31.4–37.4)
MCV RBC AUTO: 100 FL (ref 82–98)
MONOCYTES # BLD AUTO: 0.45 THOUSAND/ÂΜL (ref 0.17–1.22)
MONOCYTES NFR BLD AUTO: 8 % (ref 4–12)
NEUTROPHILS # BLD AUTO: 3.94 THOUSANDS/ÂΜL (ref 1.85–7.62)
NEUTS SEG NFR BLD AUTO: 73 % (ref 43–75)
NRBC BLD AUTO-RTO: 0 /100 WBCS
P AXIS: 61 DEGREES
PLATELET # BLD AUTO: 134 THOUSANDS/UL (ref 149–390)
PMV BLD AUTO: 11.2 FL (ref 8.9–12.7)
POTASSIUM SERPL-SCNC: 4.4 MMOL/L (ref 3.5–5.3)
PR INTERVAL: 198 MS
PROT SERPL-MCNC: 6.2 G/DL (ref 6.4–8.4)
QRS AXIS: -60 DEGREES
QRSD INTERVAL: 172 MS
QT INTERVAL: 484 MS
QTC INTERVAL: 525 MS
RBC # BLD AUTO: 3.22 MILLION/UL (ref 3.88–5.62)
SODIUM SERPL-SCNC: 136 MMOL/L (ref 135–147)
T WAVE AXIS: 76 DEGREES
VENTRICULAR RATE: 71 BPM
WBC # BLD AUTO: 5.4 THOUSAND/UL (ref 4.31–10.16)

## 2025-03-17 PROCEDURE — 71045 X-RAY EXAM CHEST 1 VIEW: CPT

## 2025-03-17 PROCEDURE — 36415 COLL VENOUS BLD VENIPUNCTURE: CPT

## 2025-03-17 PROCEDURE — 99306 1ST NF CARE HIGH MDM 50: CPT | Performed by: FAMILY MEDICINE

## 2025-03-17 PROCEDURE — 84484 ASSAY OF TROPONIN QUANT: CPT

## 2025-03-17 PROCEDURE — 93005 ELECTROCARDIOGRAM TRACING: CPT

## 2025-03-17 PROCEDURE — 80053 COMPREHEN METABOLIC PANEL: CPT

## 2025-03-17 PROCEDURE — 85025 COMPLETE CBC W/AUTO DIFF WBC: CPT

## 2025-03-17 PROCEDURE — 99285 EMERGENCY DEPT VISIT HI MDM: CPT | Performed by: EMERGENCY MEDICINE

## 2025-03-17 PROCEDURE — 83690 ASSAY OF LIPASE: CPT

## 2025-03-17 PROCEDURE — 93010 ELECTROCARDIOGRAM REPORT: CPT | Performed by: INTERNAL MEDICINE

## 2025-03-17 RX ORDER — SODIUM CHLORIDE 9 MG/ML
3 INJECTION INTRAVENOUS
Status: DISCONTINUED | OUTPATIENT
Start: 2025-03-17 | End: 2025-03-17 | Stop reason: HOSPADM

## 2025-03-17 NOTE — TELEPHONE ENCOUNTER
Nursing home or Independent living name: [facility]  Who is calling: THIERNO Javed  Callback number: [phone]  Symptoms: Patient is having chest pain and they are sending him to the ER. Patient is a full code.  Did you page oncall or place in triage hub: Sent ESC to oncall provider.

## 2025-03-17 NOTE — PROGRESS NOTES
[facility]  [address]  [place] [address]   [facility]  History and Physical    NAME: Asad Galloway  AGE: 65 y.o. SEX: male [phone]    DATE OF ENCOUNTER: 3/18/2025    Code status:  CPR    Assessment and Plan     1. Ambulatory dysfunction (Primary)  - PT/OT ordered  - Fall precautions in place    2. Chronic systolic heart failure (HCC)/HTN  - stable  - cont fluid restriction  - monitor weights  - cont Entresto 24-26 mg po qd  - cont Metoprolol succinate 25 mg po qd    3. ESRD on dialysis (HCC)  - M-W-F  - cont sensipar 30 mg po qd  - cont sevelamer suplements    4. Mood disorder ([facility])  - with labile mood  - was on divalproex     5. Thrombocytopenia ([facility])  - stable  - monitor platelets    6. CAD, multiple vessel  - stable  - cont ASA 81 mg po qd  - cont Atorvastatin 40 mg po qhs  - cont Prasugrel 10 mg po qd    7. DM2:  - not on meds  - ordered accuchecks bid    All medications and routine orders were reviewed and updated as needed.    Plan discussed with: Patient    Chief Complaint     Seen for admission at Nursing Facility    History of Present Illness     Asad Galloway., a 64 y/o male with PMH of HTN, DM2, HLD, CAD s/p multiple PCIs, HFrEF (EF 35-40%), moderate to severe AS, MR, TR, ESRD on HD (M/W/F), h/o CVA, Anemia, chronic thrombocytopenia got admitted to [facility] with hypotension during HD session and CÁRDENAS/orthopnea. He underwent right and left heart catheterization, which revealed elevated pressures and multivessel disease.   He became hypotensive in the hospital, needed pressors. He was experiencing transient ischemic events. He was continued on ASA/Prasugrel. He had right arm swelling, ultrasound was negative for DVT, but LUE showed non-occlusive thrombus, but no further anticoagulation recommended.   His overall condition is improving, got discharged to [facility] for [facility]acute rehab. At the rehab, he developed substernal chestpain, sent to ED,    He was seen and examined, stable. He is able to give some history. He lives at home with wife, son and daughter. He is independent of ADLs and IADLs, does not use any assistive devices. He offers no c/o at this time.   Staff have no concerns at this time.     HISTORY:  Past Medical History:   Diagnosis Date   • Cerebrovascular accident (CVA) due to thrombosis of left middle cerebral artery ([facility]) 07/29/2018   • Chronic kidney disease    • Coronary artery disease    • Diabetes mellitus ([facility])     not on meds   • Dialysis patient ([facility])     M-W-F   • Fistula     left upper arm for hemodialyis   • GERD (gastroesophageal reflux disease)    • History of coronary artery stent placement     x3   • Hypercholesteremia    • Hyperlipidemia    • Hypertension    • Infectious viral hepatitis     B as child   • Limb alert care status     no BP/IV left arm   • Neuropathy    • Nonhealing ulcer of heel ([facility]) 04/25/2023   • Obesity    • Osteomyelitis ([facility])     last assessed 11/4/16-per son not currently   • Penetrating foot wound, left, initial encounter 01/19/2022   • PVC's (premature ventricular contractions)     sees  Cardio   • Stroke ([facility])     last weeof July 2018 [facility]   • TIA (transient ischemic attack) 10/28/2018   • Ulcer of left heel, limited to breakdown of skin ([facility]) 06/13/2024   • Wears dentures    • Wears glasses      Family History   Problem Relation Age of Onset   • Leukemia Mother    • Liver disease Mother    • Lung cancer Mother         heavy smoker - 3 ppd   • Heart disease Father    • Liver disease Father    • Diabetes type I Father    • Multiple myeloma Sister    • Breast cancer Sister    • Urolithiasis Family    • Alcohol abuse Neg Hx    • Depression Neg Hx    • Drug abuse Neg Hx    • Substance Abuse Neg Hx    • Mental illness Neg Hx      Social History     Socioeconomic History   • Marital status: /Civil Union     Spouse name: Not on file   • Number of children: Not on file   • Years  of education: Not on file   • Highest education level: Not asked   Occupational History   • Occupation: disabled   Tobacco Use   • Smoking status: Never   • Smokeless tobacco: Never   Vaping Use   • Vaping status: Never Used   Substance and Sexual Activity   • Alcohol use: Never   • Drug use: No   • Sexual activity: Not Currently     Partners: Female     Comment: defer   Other Topics Concern   • Not on file   Social History Narrative    Daily caffeine consumption 2-3 servings a day     Social Drivers of Health     Financial Resource Strain: Patient Declined (7/13/2023)    Overall Financial Resource Strain (CARDIA)    • Difficulty of Paying Living Expenses: Patient declined   Food Insecurity: No Food Insecurity (3/3/2025)    Received from [facility], [facility], and [facility]    Hunger Vital Sign    • Worried About Running Out of Food in the Last Year: Never true    • Ran Out of Food in the Last Year: Never true   Transportation Needs: Unknown (3/3/2025)    Received from [facility], [facility], and [facility]    PRAPARE - Transportation    • Lack of Transportation (Medical): No    • Lack of Transportation (Non-Medical): Not on file   Physical Activity: Not on file   Stress: No Stress Concern Present (1/18/2019)    [employer] [place] [employer] - Occupational Stress Questionnaire    • Feeling of Stress : Only a little   Social Connections: Unknown (1/18/2019)    Social Connection and Isolation Panel [NHANES]    • Frequency of Communication with Friends and Family: Not on file    • Frequency of Social Gatherings with Friends and Family: Not on file    • Attends [employer]ism Services: Not on file    • Active Member of Clubs or Organizations: Not on file    • Attends Club or Organization Meetings: Not on file    • Marital Status:    Intimate Partner Violence: Not At Risk (1/18/2019)    Humiliation, Afraid, Rape, and Kick  questionnaire    • Fear of Current or Ex-Partner: No    • Emotionally Abused: No    • Physically Abused: No    • Sexually Abused: No   Housing Stability: Unknown (3/3/2025)    Received from [facility], [facility], and [facility]    Housing Stability Vital Sign    • Unable to Pay for Housing in the Last Year: Not on file    • Number of Times Moved in the Last Year: Not on file    • Homeless in the Last Year: No       Allergies:  No Known Allergies    Review of Systems     Review of Systems   Constitutional:  Positive for activity change and fatigue.   HENT:  Negative for hearing loss and trouble swallowing.    Eyes: Negative.    Respiratory: Negative.     Cardiovascular: Negative.    Gastrointestinal: Negative.    Genitourinary: Negative.    Musculoskeletal:  Positive for gait problem. Negative for arthralgias.   Neurological:  Positive for weakness.   Psychiatric/Behavioral: Negative.     As in HPI.    Medications and orders     All medications reviewed and updated in jail EMR.      Objective     Vitals: T: 97.6, P: 76, R: 16, BP: 130/82, 94% on RA, Wt: 217 lbs    Physical Exam  Vitals and nursing note reviewed.   Constitutional:       General: He is not in acute distress.     Appearance: Normal appearance. He is well-developed. He is not diaphoretic.   HENT:      Head: Normocephalic and atraumatic.      Nose: Nose normal.      Mouth/Throat:      Mouth: Mucous membranes are moist.      Pharynx: Oropharynx is clear. No oropharyngeal exudate.      Comments: Dentures+  Eyes:      General: No scleral icterus.        Right eye: No discharge.         Left eye: No discharge.      Extraocular Movements: Extraocular movements intact.      Conjunctiva/sclera: Conjunctivae normal.   Cardiovascular:      Rate and Rhythm: Normal rate and regular rhythm.      Heart sounds: Normal heart sounds. No murmur heard.  Pulmonary:      Effort: Pulmonary effort is normal. No respiratory  distress.      Breath sounds: Normal breath sounds. No wheezing.   Chest:      Chest wall: No tenderness.   Abdominal:      General: Bowel sounds are normal. There is no distension.      Palpations: Abdomen is soft.      Tenderness: There is no abdominal tenderness. There is no guarding.   Musculoskeletal:         General: No tenderness or deformity. Normal range of motion.      Cervical back: Normal range of motion.      Right lower leg: No edema.      Left lower leg: No edema.   Skin:     General: Skin is warm and dry.      Comments: Left upper arm bruit +  Left 5th finger skin abrasion close to nail   Neurological:      Mental Status: He is alert and oriented to person, place, and time.      Cranial Nerves: No cranial nerve deficit.      Motor: No abnormal muscle tone.      Coordination: Coordination normal.   Psychiatric:         Mood and Affect: Mood normal.         Behavior: Behavior normal.         Pertinent Laboratory/Diagnostic Studies:   The following labs/studies were reviewed please see chart or hospital paperwork for details.  Ref Range & Units (hover) 3/17/25 0012 1/11/25 0914 1/1/25 0947 12/30/24 1623 12/23/24 0522 12/22/24 1856 12/15/24 0818   Sodium 136 139 R 138 R 139 134 Low  137 139   Potassium 4.4 3.9 R 4.5 R, CM 3.7 4.9 4.5 4.1   Comment: Slightly Hemolyzed:Results may be affected.   Chloride 97 98 Low  R 99 Low  R 100 93 Low  93 Low  96   CO2 26 31 R 26 R 31 30 31 32   ANION GAP 13 10 R 13 High  R 8 11 13 11   BUN 41 High  20 R 40 High  R 18 40 High  36 High  31 High    Creatinine 7.33 High  4.64 High  R 6.96 High  R 3.88 High  CM 7.59 High  CM 7.13 High  CM 6.73 High  CM   Comment: Standardized to IDMS reference method   Glucose 90 211 High  R 172 High  R 122   CM 98 CM   Comment: If the patient is fasting, the ADA then defines impaired fasting glucose as > 100 mg/dL and diabetes as > or equal to 123 mg/dL.   Calcium 8.3 Low  7.8 Low  R 8.1 Low  R 8.7 7.6 Low  7.9 Low  8.2 Low     AST 12 Low  4 R 6 R, CM 4 Low   4 Low  8 Low    Comment: Slightly Hemolyzed:Results may be affected.   ALT 7 4 R 4 R 5 Low  CM  5 Low  CM 7 CM   Comment: Specimen collection should occur prior to Sulfasalazine administration due to the potential for falsely depressed results.   Alkaline Phosphatase 122 High  103 R 108 R 133 High   107 High  101   Total Protein 6.2 Low  6.1 Low  R 6.7 R 6.9  7.2 6.9   Albumin 3.7 3.3 Low  R 3.8 R 3.9  4.0 4.0   Total Bilirubin 0.69 0.6 R, CM 0.6 R, CM 0.54 CM  0.70 CM 0.82 CM      eGFR 7 13 Low  R 8 Low  R 15 6 7 7     Ref Range & Units (hover) 3/17/25 0012 12/30/24 1623 12/23/24 0522 12/22/24 1856 12/15/24 0818 7/19/24 0505 7/18/24 0838   WBC 5.40 5.06 5.47 5.41 5.31 4.95 3.89 Low    RBC 3.22 Low  3.12 Low  3.05 Low  3.30 Low  3.36 Low  2.76 Low  2.55 Low    Hemoglobin 10.0 Low  9.9 Low  9.6 Low  10.4 Low  10.6 Low  8.8 Low  7.9 Low    Hematocrit 32.3 Low  30.5 Low  30.1 Low  32.9 Low  34.2 Low  27.3 Low  25.5 Low     High  98 99 High  100 High  102 High  99 High  100 High    MCH 31.1 31.7 31.5 31.5 31.5 31.9 31.0   MCHC 31.0 Low  32.5 31.9 31.6 31.0 Low  32.2 31.0 Low    RDW 19.1 High  15.5 High  15.7 High  15.7 High  15.8 High  16.0 High  16.0 High    MPV 11.2 10.4 11.6 11.1 11.4 CM 11.0 10.9   Platelets 134 Low  122 Low  138 Low  134 Low  95 Low   Low  111 Low    nRBC 0 0  0 0 CM 0 0   Segmented % 73 76 High   74 73 62 72   Immature Grans % 1 0  0 1 1 1   Lymphocytes % 15 14  13 Low  15 19 15   Monocytes % 8 9  11 9 15 High  9   Eosinophils Relative 2 1  2 2 2 3   Basophils Relative 1 0  0 0 1 0   Absolute Neutrophils 3.94 3.81  3.96 3.90 3.06 2.78   Absolute Immature Grans 0.03 0.02  0.02 0.03 0.06 0.05   Absolute Lymphocytes 0.82 0.69  0.71 0.82 0.96 0.60   Absolute Monocytes 0.45 0.47  0.59 0.45 0.73 0.35   Eosinophils Absolute 0.13 0.06  0.11 0.09 0.11 0.10   Basophils Absolute 0.03 0.01  0.02 0.02 0.03 0.01       - Counseling Documentation: patient was  counseled regarding: risk factor reductions and prognosis

## 2025-03-17 NOTE — ED PROVIDER NOTES
Time reflects when diagnosis was documented in both MDM as applicable and the Disposition within this note       Time User Action Codes Description Comment    3/17/2025  1:53 AM Cheikh Gray Add [R07.9] Chest pain     3/17/2025  1:53 AM Cheikh Gray Add [R34] Anuria     3/17/2025  1:53 AM Cheikh Gray Add [N18.9] CKD (chronic kidney disease)     3/17/2025  1:54 AM Cheikh Gray Add [D64.9] Anemia, unspecified type     3/17/2025  1:57 AM Cheikh Gray Add [L03.012] Paronychia of finger of left hand           ED Disposition       None          Assessment & Plan       Medical Decision Making  65-year-old male presents with substernal chest pain that began an hour and a half ago and subsided 30 minutes ago  At risk for ACS, chest wall pain, electrolyte abnormalities, pancreatitis, pneumonia, pneumothorax, pleural effusion, cardiac arrhythmia, etc.  CBC shows no leukocytosis however patient has anemia likely secondary to chronic kidney disease  Initial troponin of 54-this is below his recent baselines but will get 2-hour repeat  Lipase of 25 no pericarditis  CMP shows mild hypocalcemia.  BUN and creatinine significantly elevated but patient is getting hemodialysis tomorrow  No other electrolyte abnormalities and hepatic function at baseline  EKG shows multiple ST depressions especially in inferior leads however this is similar to his previous EKG  No acute EKG changes noted  Will handoff patient to Dr. Go  Discussed findings with wife so far, patient likely to be discharged if troponin is normal and return to his facility  If troponin continues to elevate will admit for observation    Amount and/or Complexity of Data Reviewed  Labs: ordered.  Radiology: ordered and independent interpretation performed.    Risk  Prescription drug management.             Medications   sodium chloride (PF) 0.9 % injection 3 mL (has no administration in time range)       ED Risk Strat Scores   HEART Risk Score      Flowsheet Row Most  Recent Value   Heart Score Risk Calculator    History 0 Filed at: 03/17/2025 0203   ECG 2 Filed at: 03/17/2025 0203   Age 2 Filed at: 03/17/2025 0203   Risk Factors 2 Filed at: 03/17/2025 0203   Troponin 2 Filed at: 03/17/2025 0203   HEART Score 8 Filed at: 03/17/2025 0203          HEART Risk Score      Flowsheet Row Most Recent Value   Heart Score Risk Calculator    History 0 Filed at: 03/17/2025 0203   ECG 2 Filed at: 03/17/2025 0203   Age 2 Filed at: 03/17/2025 0203   Risk Factors 2 Filed at: 03/17/2025 0203   Troponin 2 Filed at: 03/17/2025 0203   HEART Score 8 Filed at: 03/17/2025 0203                              SBIRT 20yo+      Flowsheet Row Most Recent Value   Initial Alcohol Screen: US AUDIT-C     1. How often do you have a drink containing alcohol? 0 Filed at: 03/17/2025 0018   2. How many drinks containing alcohol do you have on a typical day you are drinking?  0 Filed at: 03/17/2025 0018   3b. FEMALE Any Age, or MALE 65+: How often do you have 4 or more drinks on one occassion? 0 Filed at: 03/17/2025 0018   Audit-C Score 0 Filed at: 03/17/2025 0018   XIN: How many times in the past year have you...    Used an illegal drug or used a prescription medication for non-medical reasons? Never Filed at: 03/17/2025 0018                            History of Present Illness       Chief Complaint   Patient presents with    Chest Pain     Chest pain started tonight around 0000, lasted for 1 hour. [place] [facility] [place] [facility] after 3 stents placed. Pain has not subsided       Past Medical History:   Diagnosis Date    Cerebrovascular accident (CVA) due to thrombosis of left middle cerebral artery (HCC) 07/29/2018    Chronic kidney disease     Coronary artery disease     Diabetes mellitus (HCC)     not on meds    Dialysis patient (HCC)     M-W-F    Fistula     left upper arm for hemodialyis    GERD (gastroesophageal reflux disease)     History of coronary artery stent placement     x3     Hypercholesteremia     Hyperlipidemia     Hypertension     Infectious viral hepatitis     B as child    Limb alert care status     no BP/IV left arm    Neuropathy     Nonhealing ulcer of heel (HCC) 04/25/2023    Obesity     Osteomyelitis (HCC)     last assessed 11/4/16-per son not currently    Penetrating foot wound, left, initial encounter 01/19/2022    PVC's (premature ventricular contractions)     sees [facility]    Stroke (HCC)     last weeof July 2018 [facility]    TIA (transient ischemic attack) 10/28/2018    Ulcer of left heel, limited to breakdown of skin (HCC) 06/13/2024    Wears dentures     Wears glasses       Past Surgical History:   Procedure Laterality Date    ABDOMINAL SURGERY      CARDIAC CATHETERIZATION N/A 05/02/2022    Procedure: Cardiac Coronary Angiogram;  Surgeon: Sam Davis MD;  [facility];  Service: Cardiology    CARDIAC CATHETERIZATION N/A 05/02/2022    Procedure: Cardiac pci;  Surgeon: Sam Davis MD;  [facility];  Service: Cardiology    CARDIAC CATHETERIZATION  02/01/2023    Procedure: Cardiac catheterization;  Surgeon: Sam Davis MD;  [facility];  Service: Cardiology    CARDIAC CATHETERIZATION N/A 02/01/2023    Procedure: Cardiac pci;  Surgeon: Sam Davis MD;  [facility];  Service: Cardiology    CARDIAC CATHETERIZATION N/A 02/01/2023    Procedure: Cardiac Coronary Angiogram;  Surgeon: Sam Davis MD;  [facility];  Service: Cardiology    CARDIAC CATHETERIZATION N/A 02/01/2023    Procedure: Cardiac other-IVUS;  Surgeon: Sam Davis MD;  [facility];  Service: Cardiology    CHOLECYSTECTOMY      Percutaneous    COLONOSCOPY      CORONARY ANGIOPLASTY WITH STENT PLACEMENT      CYSTOSCOPY      HEMODIALYSIS ADULT  1/22/2024    HEMODIALYSIS ADULT  01/24/2024    IR LOWER EXTREMITY ANGIOGRAM  9/29/2023    IR LOWER EXTREMITY ANGIOGRAM  02/26/2024    OTHER SURGICAL  "HISTORY      \"stimulator to control bowel movements\"    NJ ESOPHAGOGASTRODUODENOSCOPY TRANSORAL DIAGNOSTIC N/A 09/27/2016    Procedure: ESOPHAGOGASTRODUODENOSCOPY (EGD);  Surgeon: Adele Rowe MD;  Location: [facility];  Service: Gastroenterology    NJ LAPAROSCOPY SURG CHOLECYSTECTOMY N/A 02/29/2016    Procedure: LAPAROSCOPIC CHOLECYSTECTOMY ;  Surgeon: Cliff Roman DO;  Location: [facility];  Service: General    NJ SLCTV CATHJ 3RD+ ORD SLCTV ABDL PEL/LXTR BRNCH Left 9/29/2023    Procedure: Left leg angiogram with intervention;  Surgeon: Michelle Galvan MD;  [facility];  Service: Vascular    NJ SLCTV CATHJ 3RD+ ORD SLCTV ABDL PEL/LXTR BRNCH Left 02/26/2024    Procedure: Left leg angiogram antegrade access, left popliteal angioplasty;  Surgeon: Michelle Galvan MD;  [facility];  Service: Vascular    ROTATOR CUFF REPAIR Right     SPINAL CORD STIMULATOR IMPLANT      \"Medtronic interstim model # 3058- in lower back to control bowel movements-currently turned off-battery is dead\"    TOE AMPUTATION Right 10/28/2016    Procedure: 3RD TOE AMPUTATION ;  Surgeon: Anjel Salas DPM;  Location[facility];  Service:       Family History   Problem Relation Age of Onset    Leukemia Mother     Liver disease Mother     Lung cancer Mother         heavy smoker - 3 ppd    Heart disease Father     Liver disease Father     Diabetes type I Father     Multiple myeloma Sister     Breast cancer Sister     Urolithiasis Family     Alcohol abuse Neg Hx     Depression Neg Hx     Drug abuse Neg Hx     Substance Abuse Neg Hx     Mental illness Neg Hx       Social History     Tobacco Use    Smoking status: Never    Smokeless tobacco: Never   Vaping Use    Vaping status: Never Used   Substance Use Topics    Alcohol use: Never    Drug use: No      E-Cigarette/Vaping    E-Cigarette Use Never User       E-Cigarette/Vaping Substances    Nicotine No     THC No     CBD No     Flavoring No     Other No     Unknown " No       I have reviewed and agree with the history as documented.     65-year-old male presents with substernal chest pain that began approximately 1-1/2 hours ago.  Patient recently had cardiac catheterization and stent placement done and was discharged from the hospital 2 days ago.  Patient states that he has been taking his medications including his anticoagulants as prescribed.  Patient states that he was resting in bed when the pain began.  Continued for almost 1 hour before subsiding on its own.  Son at bedside states that patient has had pain similar to this before and typically it is from his anxiety.  Patient denies shortness of breath, current chest pain, numbness or tingling, syncope, abdominal pain.  Patient does admit to left fourth digit fingernail pain that has been ongoing for a week.  This is unrelated to his chest pain.      Chest Pain  Associated symptoms: nausea    Associated symptoms: no abdominal pain, no back pain, no cough, no dizziness, no fatigue, no fever, no palpitations, no shortness of breath, not vomiting and no weakness        Review of Systems   Constitutional:  Negative for activity change, chills, fatigue and fever.   HENT:  Negative for ear pain and sore throat.    Eyes:  Negative for pain and visual disturbance.   Respiratory:  Negative for cough and shortness of breath.    Cardiovascular:  Positive for chest pain and leg swelling. Negative for palpitations.   Gastrointestinal:  Positive for nausea. Negative for abdominal pain, diarrhea and vomiting.   Genitourinary:  Negative for dysuria and hematuria.   Musculoskeletal:  Negative for arthralgias and back pain.   Skin:  Negative for color change and rash.   Neurological:  Negative for dizziness, seizures, syncope, weakness and light-headedness.   Psychiatric/Behavioral:  Negative for agitation and confusion.    All other systems reviewed and are negative.          Objective       ED Triage Vitals   Temperature Pulse Blood  Pressure Respirations SpO2 Patient Position - Orthostatic VS   03/17/25 0010 03/16/25 2359 03/16/25 2359 03/16/25 2359 03/16/25 2359 03/16/25 2359   98 °F (36.7 °C) 73 125/55 20 96 % Sitting      Temp Source Heart Rate Source BP Location FiO2 (%) Pain Score    03/17/25 0010 03/16/25 2359 03/16/25 2359 -- --    Oral Monitor Right arm        Vitals      Date and Time Temp Pulse SpO2 Resp BP Pain Score FACES Pain Rating User   03/17/25 0100 -- 72 96 % -- 121/58 -- -- MM   03/17/25 0010 98 °F (36.7 °C) -- -- -- -- -- -- MM   03/16/25 2359 -- 73 96 % 20 125/55 -- -- DB            Physical Exam  Vitals and nursing note reviewed.   Constitutional:       General: He is not in acute distress.     Appearance: He is well-developed.   HENT:      Head: Normocephalic and atraumatic.   Eyes:      Conjunctiva/sclera: Conjunctivae normal.      Pupils: Pupils are equal, round, and reactive to light.   Cardiovascular:      Rate and Rhythm: Normal rate and regular rhythm.      Pulses:           Radial pulses are 2+ on the right side and 2+ on the left side.        Dorsalis pedis pulses are 2+ on the right side and 2+ on the left side.        Posterior tibial pulses are 2+ on the right side and 2+ on the left side.      Heart sounds: Heart sounds not distant.      No friction rub. No gallop.   Pulmonary:      Effort: Pulmonary effort is normal. No tachypnea, accessory muscle usage or respiratory distress.      Breath sounds: Normal breath sounds. No decreased breath sounds, wheezing, rhonchi or rales.   Chest:      Chest wall: No mass, deformity, tenderness or crepitus.   Abdominal:      Palpations: Abdomen is soft.      Tenderness: There is no abdominal tenderness. There is no guarding or rebound.   Musculoskeletal:         General: No swelling.      Cervical back: Normal range of motion and neck supple.      Right lower leg: No tenderness. Edema present.      Left lower leg: No tenderness. Edema present.      Comments: 1+ pitting  edema bilaterally   Skin:     General: Skin is warm and dry.      Capillary Refill: Capillary refill takes less than 2 seconds.      Coloration: Skin is not cyanotic or pale.      Findings: No ecchymosis or erythema.      Comments: Patient has moderate ecchymosis along right lateral aspect of neck as well as over right shoulder.  Son states that this is because of a dialysis catheter that was placed in the internal jugular during his last hospital admission.   Neurological:      Mental Status: He is alert.   Psychiatric:         Mood and Affect: Mood normal.         Behavior: Behavior is agitated.         Results Reviewed       Procedure Component Value Units Date/Time    HS Troponin I 2hr [[id number]] Collected: 03/17/25 0201    Lab Status: In process Specimen: Blood from Arm, Right Updated: 03/17/25 0205    Lipase [[id number]]  (Normal) Collected: 03/17/25 0012    Lab Status: Final result Specimen: Blood from Arm, Right Updated: 03/17/25 0104     Lipase 25 u/L     Comprehensive metabolic panel [[id number]]  (Abnormal) Collected: 03/17/25 0012    Lab Status: Final result Specimen: Blood from Arm, Right Updated: 03/17/25 0104     Sodium 136 mmol/L      Potassium 4.4 mmol/L      Chloride 97 mmol/L      CO2 26 mmol/L      ANION GAP 13 mmol/L      BUN 41 mg/dL      Creatinine 7.33 mg/dL      Glucose 90 mg/dL      Calcium 8.3 mg/dL      AST 12 U/L      ALT 7 U/L      Alkaline Phosphatase 122 U/L      Total Protein 6.2 g/dL      Albumin 3.7 g/dL      Total Bilirubin 0.69 mg/dL      eGFR 7 ml/min/1.73sq m     Narrative:      National Kidney Disease Foundation guidelines for Chronic Kidney Disease (CKD):     Stage 1 with normal or high GFR (GFR > 90 mL/min/1.73 square meters)    Stage 2 Mild CKD (GFR = 60-89 mL/min/1.73 square meters)    Stage 3A Moderate CKD (GFR = 45-59 mL/min/1.73 square meters)    Stage 3B Moderate CKD (GFR = 30-44 mL/min/1.73 square meters)    Stage 4 Severe CKD (GFR = 15-29 mL/min/1.73 square meters)     Stage 5 End Stage CKD (GFR <15 mL/min/1.73 square meters)  Note: GFR calculation is accurate only with a steady state creatinine    HS Troponin 0hr (reflex protocol) [[id number]]  (Abnormal) Collected: 03/17/25 0012    Lab Status: Final result Specimen: Blood from Arm, Right Updated: 03/17/25 0051     hs TnI 0hr 54 ng/L     CBC and differential [[id number]]  (Abnormal) Collected: 03/17/25 0012    Lab Status: Final result Specimen: Blood from Arm, Right Updated: 03/17/25 0021     WBC 5.40 Thousand/uL      RBC 3.22 Million/uL      Hemoglobin 10.0 g/dL      Hematocrit 32.3 %       fL      MCH 31.1 pg      MCHC 31.0 g/dL      RDW 19.1 %      MPV 11.2 fL      Platelets 134 Thousands/uL      nRBC 0 /100 WBCs      Segmented % 73 %      Immature Grans % 1 %      Lymphocytes % 15 %      Monocytes % 8 %      Eosinophils Relative 2 %      Basophils Relative 1 %      Absolute Neutrophils 3.94 Thousands/µL      Absolute Immature Grans 0.03 Thousand/uL      Absolute Lymphocytes 0.82 Thousands/µL      Absolute Monocytes 0.45 Thousand/µL      Eosinophils Absolute 0.13 Thousand/µL      Basophils Absolute 0.03 Thousands/µL             X-ray chest 1 view portable   ED Interpretation by Cheikh Gray DO (03/17 0049)   No acute cardiopulmonary abnormality          ECG 12 Lead Documentation Only    Date/Time: 3/17/2025 2:06 AM    Performed by: Cheikh Gray DO  Authorized by: Cheikh Gray DO    Indications / Diagnosis:  Chest pain  ECG reviewed by me, the ED Provider: yes    Patient location:  ED  Previous ECG:     Previous ECG:  Compared to current    Comparison ECG info:  December 30, 2024    Similarity:  No change    Comparison to cardiac monitor: Yes    Interpretation:     Interpretation: abnormal    Rate:     ECG rate:  71    ECG rate assessment: normal    Rhythm:     Rhythm: sinus rhythm    Ectopy:     Ectopy: none    QRS:     QRS axis:  Normal    QRS intervals:  Normal  Conduction:     Conduction: abnormal      Abnormal  conduction: bifascicular block    ST segments:     ST segments:  Abnormal    Elevation:  AVF    Depression:  II, III, aVF, V2 and V3  T waves:     T waves: non-specific    Comments:      Sinus rhythm with bifascicular block.  Ventricular rate of 71.  Diffuse ST depressions especially in inferior leads.  ST elevation in aVR.  This is similar to previous EKG with no acute changes noted.      ED Medication and Procedure Management   Prior to Admission Medications   Prescriptions Last Dose Informant Patient Reported? Taking?   Apixaban Starter Pack 5 MG TBPK   Yes No   Sig: Take-Home Kit dispensed by provider to patient in hospital setting. Use as directed.   MIDODRINE HCL PO  Child Yes No   Sig: Take 5 mg by mouth PRN w/ dialysis   Sevelamer Carbonate 2.4 g PACK  Child Yes No   Sig: VIGOROUSLY MIX CONTENTS OF 1 PACKET IN WATER (IT WILL NOT DISSOLVE) AND DRINK 3 TIMES DAILY WITH MEALS   Vitamin D3 1.25 MG (15112 UT) capsule  Child No No   Sig: TAKE 1 CAPSULE BY MOUTH ONE TIME PER WEEK   aspirin 81 mg chewable tablet  Child Yes No   Sig: Chew 81 mg daily   atorvastatin (LIPITOR) 40 mg tablet  Child No No   Sig: TAKE 1 TABLET BY MOUTH DAILY WITH DINNER   cinacalcet (SENSIPAR) 60 MG tablet  Child No No   Sig: Take 2 tablets (120 mg total) by mouth 3 (three) times a week   divalproex sodium (DEPAKOTE SPRINKLE) 125 MG capsule Not Taking Child No No   Sig: Take 2 capsules (250 mg total) by mouth every 12 (twelve) hours   Patient not taking: Reported on 3/17/2025   metoprolol succinate (TOPROL-XL) 25 mg 24 hr tablet   No No   Sig: TAKE 1 TABLET BY MOUTH TWICE A DAY   prasugrel (EFFIENT) tablet  Child No No   Sig: TAKE 1 TABLET (5 MG TOTAL) BY MOUTH DAILY.   Patient taking differently: Take 10 mg by mouth daily   sacubitril-valsartan (Entresto) 24-26 MG TABS  Child No No   Sig: Take 1 tablet by mouth in the morning      Facility-Administered Medications: None     Patient's Medications   Discharge Prescriptions    No medications  on file     No discharge procedures on file.  ED SEPSIS DOCUMENTATION   Time reflects when diagnosis was documented in both MDM as applicable and the Disposition within this note       Time User Action Codes Description Comment    3/17/2025  1:53 AM Cheikh Gray [R07.9] Chest pain     3/17/2025  1:53 AM Cheikh Gray [R34] Anuria     3/17/2025  1:53 AM Cheikh Gray [N18.9] CKD (chronic kidney disease)     3/17/2025  1:54 AM Cheikh Gray [D64.9] Anemia, unspecified type     3/17/2025  1:57 AM Cheikh Gray [L03.012] Paronychia of finger of left hand                  Cheikh Gray DO  03/17/25 0209

## 2025-03-17 NOTE — TELEPHONE ENCOUNTER
Called and talked to pt son, son states that Asad has heart surgery last Thursday in [place], after the surgery they placed him in [facility] in [place], wife is looking to have him moved to  acute rehab. Pt's son says they are not currently working with a  so there is no one else that is able to help them make that switch.

## 2025-03-17 NOTE — TELEPHONE ENCOUNTER
Attempted to call wife as requested, no answer.Family needs to  work with  at current facility regarding request for transfer to  another facility.

## 2025-03-17 NOTE — ED ATTENDING ATTESTATION
3/16/2025  I, Jim Borrego MD, saw and evaluated the patient. I have discussed the patient with the resident/non-physician practitioner and agree with the resident's/non-physician practitioner's findings, Plan of Care, and MDM as documented in the resident's/non-physician practitioner's note, except where noted. All available labs and Radiology studies were reviewed.  I was present for key portions of any procedure(s) performed by the resident/non-physician practitioner and I was immediately available to provide assistance.       At this point I agree with the current assessment done in the Emergency Department.  I have conducted an independent evaluation of this patient a history and physical is as follows:    65-year-old male with a history of aortic stenosis, coronary disease, end-stage renal disease on hemodialysis presented for evaluation after developing chest pain this evening at his acute rehab facility.  He reports symptoms have improved but still has low-level discomfort in the chest.  He recently spent over 2 weeks in the hospital at [facility].  Initially admitted for cardiac catheterization as part of preoperative evaluation for aortic valve replacement.  Per notes, during his hospitalization he developed chest pain and ST elevations and depressions while undergoing hemodialysis.  He underwent an additional cardiac catheterization and revascularization of his previously placed LAD stent.  He remains on aspirin and prasugrel.  Was found to have left IJ nonocclusive thrombus but he was not initiated on full anticoagulation.    EKG shows inferolateral T wave inversions which is similar to prior EKG.    ED Course  ED Course as of 03/17/25 0101   Mon Mar 17, 2025   0057 hs TnI 0hr(!): 54  Appears to be approximate baseline for the patient.  Will be awaiting 2-hour level to ensure no worsening.  If stable will be planning discharge back to his rehab facility.         Critical Care  Time  Procedures

## 2025-03-17 NOTE — TELEPHONE ENCOUNTER
Pt was in the ED on 3/16/25 for chest pain, pt was DC back to rehab facility near [facility]. Pt wife is not happy with this rehab and would like her  transferred to  In rehab in [place]. Wife is requesting a call back from Mallorie Ulloa please, #[phone]

## 2025-03-17 NOTE — TELEPHONE ENCOUNTER
Patients wife called requesting an update. Made her aware the referral was placed and to call her son as he was made aware already.

## 2025-03-17 NOTE — TELEPHONE ENCOUNTER
I have placed a referral  for social work care management and someone will be reaching out to assist.

## 2025-03-17 NOTE — DISCHARGE INSTRUCTIONS
For your finger infection-please soak finger in warm water twice daily and use warm rag to compress finger.  Try to drain any pus or discharge from the finger.  You may apply bacitracin or Neosporin ointment to finger after each warm compress/soak    Follow-up with your cardiologist as scheduled and please see your PCP in 5 to 7 days, follow-up with cardiology as well.    Continue your normal Monday Wednesday Friday dialysis schedule

## 2025-03-17 NOTE — ED CARE HANDOFF
Emergency Department Sign Out Note        Sign out and transfer of care from Dr. Gray. See Separate Emergency Department note.     The patient, Asad Galloway, was evaluated by the previous provider for chest pain - ACS r/o.    Workup Completed:  CBC, CMP, initial troponin, cxr    ED Course / Workup Pending (followup):  Repeat troponin. Currently 54, around baseline - dialysis dependent.       HEART Risk Score      Flowsheet Row Most Recent Value   Heart Score Risk Calculator    History 0 Filed at: 03/17/2025 0203   ECG 2 Filed at: 03/17/2025 0203   Age 2 Filed at: 03/17/2025 0203   Risk Factors 2 Filed at: 03/17/2025 0203   Troponin 2 Filed at: 03/17/2025 0203   HEART Score 8 Filed at: 03/17/2025 0203        MWF dialysis    Repeat troponin -1.                            ED Course as of 03/17/25 0246   Mon Mar 17, 2025   0141 Pt care signed out from Dr. Gray at shift change. Pt is hemodynamically stable. Disposition pending repeat troponin.   0241 Delta trop of -1. Pt to be discharged home, hemodynamically stable, no acute distress. No chest pain. Otherwise at baseline.     Procedures  Medical Decision Making  Amount and/or Complexity of Data Reviewed  Labs: ordered.  Radiology: ordered and independent interpretation performed.    Risk  Prescription drug management.            Disposition  Final diagnoses:   Chest pain   Anuria   CKD (chronic kidney disease)   Anemia, unspecified type   Paronychia of finger of left hand     Time reflects when diagnosis was documented in both MDM as applicable and the Disposition within this note       Time User Action Codes Description Comment    3/17/2025  1:53 AM Cheikh Gray Add [R07.9] Chest pain     3/17/2025  1:53 AM Cheikh Gray Add [R34] Anuria     3/17/2025  1:53 AM Cheikh Gray Add [N18.9] CKD (chronic kidney disease)     3/17/2025  1:54 AM Cheikh Gray Add [D64.9] Anemia, unspecified type     3/17/2025  1:57 AM Cheikh Gray Add [L03.012] Paronychia of finger of left  hand           ED Disposition       ED Disposition   Discharge    Condition   Stable    Date/Time   Mon Mar 17, 2025 0241    Comment   Asad Galloway discharge to home/self care.                   Follow-up Information       Follow up With Specialties Details Why Contact Info Additional Information    [facility], Nurse Practitioner Go in 3 days To verify that your symptoms have improved [address]  [address]  [place] [address]  [phone]        [facility] Emergency Department Emergency Medicine Go to  If symptoms worsen including your chest pain returns, shortness of breath, or any other concerning symptoms develop [facility] [facility] [address]  [phone] [facility] Emergency Department, [facility] [place], [place], [address]          Patient's Medications   Discharge Prescriptions    No medications on file            ED Provider  Electronically Signed by     Narinder Go,   03/17/25 0246       Narinder Go,   03/17/25 0246

## 2025-03-18 ENCOUNTER — PATIENT OUTREACH (OUTPATIENT)
Dept: CASE MANAGEMENT | Facility: OTHER | Age: 65
End: 2025-03-18

## 2025-03-18 ENCOUNTER — APPOINTMENT (OUTPATIENT)
Facility: CLINIC | Age: 65
End: 2025-03-18
Payer: MEDICARE

## 2025-03-18 NOTE — PROGRESS NOTES
JOSE CHAVEZ received referral for patient for assistance in leaving his current STR and transferred to  Rehab.  Family was directed to the SW at their facility as well, but family was unsure of who this may be.  JOSE CHAVEZ reached out to [facility] and spoke with the Chrissie regarding the above.  SW confirmed she will speak with them.    JOSE CHAVEZ reached out to spouse-Eliana informing her of the above.

## 2025-03-19 ENCOUNTER — TELEPHONE (OUTPATIENT)
Dept: OTHER | Facility: OTHER | Age: 65
End: 2025-03-19

## 2025-03-19 ENCOUNTER — NURSING HOME VISIT (OUTPATIENT)
Dept: GERIATRICS | Facility: OTHER | Age: 65
End: 2025-03-19
Payer: MEDICARE

## 2025-03-19 ENCOUNTER — APPOINTMENT (OUTPATIENT)
Facility: CLINIC | Age: 65
End: 2025-03-19
Payer: MEDICARE

## 2025-03-19 VITALS
HEART RATE: 73 BPM | DIASTOLIC BLOOD PRESSURE: 60 MMHG | BODY MASS INDEX: 32.05 KG/M2 | OXYGEN SATURATION: 94 % | TEMPERATURE: 97 F | SYSTOLIC BLOOD PRESSURE: 110 MMHG | WEIGHT: 217 LBS | RESPIRATION RATE: 18 BRPM

## 2025-03-19 DIAGNOSIS — N18.6 ESRD ON DIALYSIS (HCC): ICD-10-CM

## 2025-03-19 DIAGNOSIS — F39 MOOD DISORDER (HCC): Chronic | ICD-10-CM

## 2025-03-19 DIAGNOSIS — I50.22 CHRONIC SYSTOLIC HEART FAILURE (HCC): Primary | ICD-10-CM

## 2025-03-19 DIAGNOSIS — Z99.2 TYPE 2 DIABETES MELLITUS WITH CHRONIC KIDNEY DISEASE ON CHRONIC DIALYSIS, WITHOUT LONG-TERM CURRENT USE OF INSULIN (HCC): ICD-10-CM

## 2025-03-19 DIAGNOSIS — R53.1 GENERALIZED WEAKNESS: ICD-10-CM

## 2025-03-19 DIAGNOSIS — E11.22 TYPE 2 DIABETES MELLITUS WITH CHRONIC KIDNEY DISEASE ON CHRONIC DIALYSIS, WITHOUT LONG-TERM CURRENT USE OF INSULIN (HCC): ICD-10-CM

## 2025-03-19 DIAGNOSIS — N18.6 TYPE 2 DIABETES MELLITUS WITH CHRONIC KIDNEY DISEASE ON CHRONIC DIALYSIS, WITHOUT LONG-TERM CURRENT USE OF INSULIN (HCC): ICD-10-CM

## 2025-03-19 DIAGNOSIS — Z99.2 ESRD ON DIALYSIS (HCC): ICD-10-CM

## 2025-03-19 PROCEDURE — 99309 SBSQ NF CARE MODERATE MDM 30: CPT | Performed by: NURSE PRACTITIONER

## 2025-03-19 NOTE — ASSESSMENT & PLAN NOTE
Wt Readings from Last 3 Encounters:   03/19/25 98.4 kg (217 lb)   02/19/25 96.2 kg (212 lb)   01/28/25 94.8 kg (209 lb)     Weights stable   Continue PTA meds Entresto and Toprol Xl   Continue Fluid Restriction  Continue HD   OP f/u with Cardiology as scheduled

## 2025-03-19 NOTE — ASSESSMENT & PLAN NOTE
Lab Results   Component Value Date    HGBA1C 5.5 11/07/2024     Diet controlled  Good A1c control

## 2025-03-19 NOTE — ASSESSMENT & PLAN NOTE
Multifactorial  Continue PT/OT  Fall Precautions  Ensure adequate nutrition/hydration   Monitor CBC/BMP    following for d/c planning       Patient is requesting to go home, states he is feeling stronger, offers no complaints on exam today, states he has a good support system at home

## 2025-03-19 NOTE — ASSESSMENT & PLAN NOTE
Lab Results   Component Value Date    EGFR 7 03/17/2025    EGFR 13 (L) 01/11/2025    EGFR 8 (L) 01/01/2025    CREATININE 7.33 (H) 03/17/2025    CREATININE 4.64 (H) 01/11/2025    CREATININE 6.96 (H) 01/01/2025     Continues on HD MWF  Continue Sensipar 30 mg daily   Continue Sevelamer   OP f/u w/ Nephrology as scheduled

## 2025-03-19 NOTE — PROGRESS NOTES
[facility]  [address] [address]  [phone]  FACILITY: [facility]  Code 31 (STR)    NAME: Asad Galloway  AGE: 65 y.o. SEX: male CODE STATUS: CPR    DATE OF ENCOUNTER: 3/20/2025    Assessment and Plan     1. Chronic systolic heart failure (HCC)  Assessment & Plan:  Wt Readings from Last 3 Encounters:   03/19/25 98.4 kg (217 lb)   02/19/25 96.2 kg (212 lb)   01/28/25 94.8 kg (209 lb)     Weights stable   Continue PTA meds Entresto and Toprol Xl   Continue Fluid Restriction  Continue HD   OP f/u with Cardiology as scheduled   2. ESRD on dialysis (HCC)  Assessment & Plan:  Lab Results   Component Value Date    EGFR 7 03/17/2025    EGFR 13 (L) 01/11/2025    EGFR 8 (L) 01/01/2025    CREATININE 7.33 (H) 03/17/2025    CREATININE 4.64 (H) 01/11/2025    CREATININE 6.96 (H) 01/01/2025     Continues on HD MWF  Continue Sensipar 30 mg daily   Continue Sevelamer   OP f/u w/ Nephrology as scheduled   3. Generalized weakness  Assessment & Plan:  Multifactorial  Continue PT/OT  Fall Precautions  Ensure adequate nutrition/hydration   Monitor CBC/BMP    following for d/c planning       Patient is requesting to go home, states he is feeling stronger, offers no complaints on exam today, states he has a good support system at home    4. Mood disorder (HCC)  Assessment & Plan:  Noted to have a labile mood  Mood stable on exam     5. Type 2 diabetes mellitus with chronic kidney disease on chronic dialysis, without long-term current use of insulin ([facility])  Assessment & Plan:    Lab Results   Component Value Date    HGBA1C 5.5 11/07/2024     Diet controlled  Good A1c control        All medications and routine orders were reviewed and updated as needed.    Chief Complaint     STR follow up visit    Past Medical and Surgica History      Past Medical History:   Diagnosis Date    Cerebrovascular accident (CVA) due to thrombosis of left middle cerebral artery (HCC) 07/29/2018    Chronic  kidney disease     Coronary artery disease     Diabetes mellitus ([facility])     not on meds    Dialysis patient ([facility])     M-W-F    Fistula     left upper arm for hemodialyis    GERD (gastroesophageal reflux disease)     History of coronary artery stent placement     x3    Hypercholesteremia     Hyperlipidemia     Hypertension     Infectious viral hepatitis     B as child    Limb alert care status     no BP/IV left arm    Neuropathy     Nonhealing ulcer of heel ([facility]) 04/25/2023    Obesity     Osteomyelitis ([facility])     last assessed 11/4/16-per son not currently    Penetrating foot wound, left, initial encounter 01/19/2022    PVC's (premature ventricular contractions)     sees [facility]    Stroke ([facility])     last weeof July 2018 [facility]    TIA (transient ischemic attack) 10/28/2018    Ulcer of left heel, limited to breakdown of skin ([facility]) 06/13/2024    Wears dentures     Wears glasses      Past Surgical History:   Procedure Laterality Date    ABDOMINAL SURGERY      CARDIAC CATHETERIZATION N/A 05/02/2022    Procedure: Cardiac Coronary Angiogram;  Surgeon: Sam Davis MD;  [facility];  Service: Cardiology    CARDIAC CATHETERIZATION N/A 05/02/2022    Procedure: Cardiac pci;  Surgeon: Sam Davis MD;  [facility];  Service: Cardiology    CARDIAC CATHETERIZATION  02/01/2023    Procedure: Cardiac catheterization;  Surgeon: Sam Davis MD;  [facility];  Service: Cardiology    CARDIAC CATHETERIZATION N/A 02/01/2023    Procedure: Cardiac pci;  Surgeon: Sam Davis MD;  [facility];  Service: Cardiology    CARDIAC CATHETERIZATION N/A 02/01/2023    Procedure: Cardiac Coronary Angiogram;  Surgeon: Sam Davis MD;  [facility];  Service: Cardiology    CARDIAC CATHETERIZATION N/A 02/01/2023    Procedure: Cardiac other-IVUS;  Surgeon: Sam Davis MD;  [facility]: [facility];  Service: Cardiology    CHOLECYSTECTOMY       "Percutaneous    COLONOSCOPY      CORONARY ANGIOPLASTY WITH STENT PLACEMENT      CYSTOSCOPY      HEMODIALYSIS ADULT  1/22/2024    HEMODIALYSIS ADULT  01/24/2024    IR LOWER EXTREMITY ANGIOGRAM  9/29/2023    IR LOWER EXTREMITY ANGIOGRAM  02/26/2024    OTHER SURGICAL HISTORY      \"stimulator to control bowel movements\"    MI ESOPHAGOGASTRODUODENOSCOPY TRANSORAL DIAGNOSTIC N/A 09/27/2016    Procedure: ESOPHAGOGASTRODUODENOSCOPY (EGD);  Surgeon: Adele Rowe MD;  Location: [facility] GI LAB;  Service: Gastroenterology    MI LAPAROSCOPY SURG CHOLECYSTECTOMY N/A 02/29/2016    Procedure: LAPAROSCOPIC CHOLECYSTECTOMY ;  Surgeon: Cliff Roman DO;  Location: [facility] Main OR;  Service: General    MI SLCTV CATHJ 3RD+ ORD SLCTV ABDL PEL/LXTR BRNCH Left 9/29/2023    Procedure: Left leg angiogram with intervention;  Surgeon: Michelle Galvan MD;  Location: [facility] MAIN OR;  Service: Vascular    MI SLCTV CATHJ 3RD+ ORD SLCTV ABDL PEL/LXTR BRNCH Left 02/26/2024    Procedure: Left leg angiogram antegrade access, left popliteal angioplasty;  Surgeon: Michelle Galvan MD;  Location: [facility] MAIN OR;  Service: Vascular    ROTATOR CUFF REPAIR Right     SPINAL CORD STIMULATOR IMPLANT      \"Medtronic interstim model # 3058- in lower back to control bowel movements-currently turned off-battery is dead\"    TOE AMPUTATION Right 10/28/2016    Procedure: 3RD TOE AMPUTATION ;  Surgeon: Anjel Salas DPM;  Location: [facility] Main OR;  Service:      No Known Allergies       History of Present Illness     Asad Galloway is a 65 y.o. male admitted to [facility] following hospital stay for Hypotension during HD treatment. Patient has a past medical Hx including but not limited to ESRD on HD, CHF,Aortic Stenosis, CVA, Anemia, DM2, HLD, CAD s/p PCI . Patient is seen in collaboration with nursing for medical mgmt and STR follow up.     Patient initially presented to [facility] after a hypotensive episode during HD with CÁRDENAS and " Orthopnea. S/P right and left heart cath which revealed elevated pressures and multivessel disease. He was hypotensive while inpatient requiring pressors. Per review of records he was having transient ischemic events and was continued on aspirin and prasugrel. He developed Right and left arm swelling, ultrasound negative for DVT on Right but hid show Non-occlusive thrombus on left upper extremity, no need for increased anticoagulants/antiplatelets. His condition improved and was recommended to discharge to post acute rehab.     Seen and examined at bedside today. Patient is a reliable historian. Patient is oob, playing cards with his family. Patient offers no complaints today, states he has been working with therapy. He is asking to go home. At baseline he lives with his wife, son and daughter. Had been independent with ADLs and IADLs, did not require use of walker or cane. Staff have no concerns at this time.         The patient's allergies, past medical, surgical, social and family history were reviewed and unchanged.    Review of Systems     Review of Systems   All other systems reviewed and are negative.        Objective     Vitals:   Vitals:    03/19/25 1010   BP: 110/60   Pulse: 73   Resp: 18   Temp: (!) 97 °F (36.1 °C)   SpO2: 94%         Physical Exam  Vitals and nursing note reviewed.   Constitutional:       General: He is not in acute distress.     Appearance: Normal appearance.   HENT:      Head: Normocephalic and atraumatic.      Nose: No congestion or rhinorrhea.      Mouth/Throat:      Mouth: Mucous membranes are moist.   Eyes:      General: No scleral icterus.     Extraocular Movements: Extraocular movements intact.      Conjunctiva/sclera: Conjunctivae normal.      Pupils: Pupils are equal, round, and reactive to light.   Cardiovascular:      Rate and Rhythm: Normal rate and regular rhythm.      Pulses: Normal pulses.      Heart sounds: Normal heart sounds. No murmur heard.  Pulmonary:      Effort:  Pulmonary effort is normal.      Breath sounds: Normal breath sounds. No wheezing, rhonchi or rales.   Abdominal:      General: Bowel sounds are normal. There is no distension.      Palpations: Abdomen is soft.      Tenderness: There is no abdominal tenderness.   Musculoskeletal:         General: No swelling or signs of injury.   Lymphadenopathy:      Cervical: No cervical adenopathy.   Skin:     General: Skin is warm and dry.      Findings: Erythema (Left 5th finger skin abrasion close to nail) present.   Neurological:      Mental Status: He is alert and oriented to person, place, and time.      Sensory: No sensory deficit.      Motor: No weakness.      Gait: Gait normal.   Psychiatric:         Mood and Affect: Mood normal.         Behavior: Behavior normal.         Pertinent Laboratory/Diagnostic Studies:     Reviewed in facility chart      Current Medications   Medications reviewed and updated see facility MAR for details.      Current Outpatient Medications:     Apixaban Starter Pack 5 MG TBPK, Take-Home Kit dispensed by provider to patient in hospital setting. Use as directed., Disp: , Rfl:     aspirin 81 mg chewable tablet, Chew 81 mg daily, Disp: , Rfl:     atorvastatin (LIPITOR) 40 mg tablet, TAKE 1 TABLET BY MOUTH DAILY WITH DINNER, Disp: 90 tablet, Rfl: 1    cinacalcet (SENSIPAR) 60 MG tablet, Take 2 tablets (120 mg total) by mouth 3 (three) times a week, Disp: 24 tablet, Rfl: 0    divalproex sodium (DEPAKOTE SPRINKLE) 125 MG capsule, Take 2 capsules (250 mg total) by mouth every 12 (twelve) hours (Patient not taking: Reported on 3/17/2025), Disp: 360 capsule, Rfl: 1    metoprolol succinate (TOPROL-XL) 25 mg 24 hr tablet, TAKE 1 TABLET BY MOUTH TWICE A DAY, Disp: 180 tablet, Rfl: 1    MIDODRINE HCL PO, Take 5 mg by mouth PRN w/ dialysis, Disp: , Rfl:     prasugrel (EFFIENT) tablet, TAKE 1 TABLET (5 MG TOTAL) BY MOUTH DAILY. (Patient taking differently: Take 10 mg by mouth daily), Disp: 90 tablet, Rfl: 1     "sacubitril-valsartan (Entresto) 24-26 MG TABS, Take 1 tablet by mouth in the morning, Disp: 30 tablet, Rfl: 5    Sevelamer Carbonate 2.4 g PACK, VIGOROUSLY MIX CONTENTS OF 1 PACKET IN WATER (IT WILL NOT DISSOLVE) AND DRINK 3 TIMES DAILY WITH MEALS, Disp: , Rfl:     Vitamin D3 1.25 MG (44582 UT) capsule, TAKE 1 CAPSULE BY MOUTH ONE TIME PER WEEK, Disp: 12 capsule, Rfl: 4     Please note:  Voice-recognition software may have been used in the preparation of this document.  Occasional wrong word or \"sound-alike\" substitutions may have occurred due to the inherent limitations of voice recognition software.  Interpretation should be guided by context.         BRITTANY Martinez  3/20/2025  1:04 PM    "

## 2025-03-19 NOTE — TELEPHONE ENCOUNTER
Nursing home or Independent living name: [facility]    Who is calling: Loan    Callback number: [phone]     Symptoms: Patient lowered himself to the floor, no injuries.  Loan is not requiring a return call.    Message sent to on call provider via ESC.

## 2025-03-20 ENCOUNTER — TELEPHONE (OUTPATIENT)
Dept: OTHER | Facility: OTHER | Age: 65
End: 2025-03-20

## 2025-03-20 ENCOUNTER — TELEMEDICINE (OUTPATIENT)
Dept: CARDIOLOGY CLINIC | Facility: CLINIC | Age: 65
End: 2025-03-20
Payer: MEDICARE

## 2025-03-20 ENCOUNTER — APPOINTMENT (OUTPATIENT)
Facility: CLINIC | Age: 65
End: 2025-03-20
Payer: MEDICARE

## 2025-03-20 ENCOUNTER — TELEPHONE (OUTPATIENT)
Age: 65
End: 2025-03-20

## 2025-03-20 DIAGNOSIS — Z95.5 STATUS POST INSERTION OF DRUG ELUTING CORONARY ARTERY STENT: ICD-10-CM

## 2025-03-20 DIAGNOSIS — I50.22 CHRONIC SYSTOLIC HEART FAILURE (HCC): ICD-10-CM

## 2025-03-20 DIAGNOSIS — I35.0 NONRHEUMATIC AORTIC VALVE STENOSIS: ICD-10-CM

## 2025-03-20 DIAGNOSIS — I25.5 ISCHEMIC CARDIOMYOPATHY: ICD-10-CM

## 2025-03-20 DIAGNOSIS — Z79.02 ANTIPLATELET OR ANTITHROMBOTIC LONG-TERM USE: ICD-10-CM

## 2025-03-20 DIAGNOSIS — I10 PRIMARY HYPERTENSION: ICD-10-CM

## 2025-03-20 DIAGNOSIS — R07.9 CHEST PAIN: ICD-10-CM

## 2025-03-20 DIAGNOSIS — Z86.718 HISTORY OF DVT (DEEP VEIN THROMBOSIS): ICD-10-CM

## 2025-03-20 DIAGNOSIS — Z76.82 PRE-KIDNEY TRANSPLANT, LISTED: ICD-10-CM

## 2025-03-20 DIAGNOSIS — Z99.2 ESRD ON DIALYSIS (HCC): ICD-10-CM

## 2025-03-20 DIAGNOSIS — N18.6 ESRD ON DIALYSIS (HCC): ICD-10-CM

## 2025-03-20 DIAGNOSIS — I25.10 CAD, MULTIPLE VESSEL: Primary | ICD-10-CM

## 2025-03-20 DIAGNOSIS — E78.2 MIXED HYPERLIPIDEMIA: ICD-10-CM

## 2025-03-20 PROCEDURE — 99214 OFFICE O/P EST MOD 30 MIN: CPT | Performed by: INTERNAL MEDICINE

## 2025-03-20 RX ORDER — ATORVASTATIN CALCIUM 40 MG/1
40 TABLET, FILM COATED ORAL
Qty: 90 TABLET | Refills: 3 | Status: SHIPPED | OUTPATIENT
Start: 2025-03-20

## 2025-03-20 RX ORDER — PRASUGREL 10 MG/1
10 TABLET, FILM COATED ORAL DAILY
Qty: 90 TABLET | Refills: 3 | Status: SHIPPED | OUTPATIENT
Start: 2025-03-20

## 2025-03-20 NOTE — TELEPHONE ENCOUNTER
Caller: Sam ( SON)     Doctor: Dr. Britney Tan MD     Reason for call: Pt is scheduled for an Urgent appt today at 4:40 pm and son is requesting if appt can be virtual due to pt being in rehab. Please call Sam and advise..    Call back#: [phone]

## 2025-03-20 NOTE — TELEPHONE ENCOUNTER
Caller: Sam Galloway    Doctor: Dr. Tan    Call back #: [phone]    Reason for call: Patient's son is following up. He would like to know if his father's appointment could be made virtual today since patient is at a rehab facility. Please call Sam to confirm if this can be made virtual. Thank you.

## 2025-03-20 NOTE — PROGRESS NOTES
[facility]    Name:Asad Galloway   DOS: 3/20/2025     Chief Complaint: Follow up    HISTORY OF PRESENT ILLNESS:    HPI:  Asad Galloway is a 65 y.o. male. He  has a past medical history of Cerebrovascular accident (CVA) due to thrombosis of left middle cerebral artery (HCC) (07/29/2018), Chronic kidney disease, Coronary artery disease, Diabetes mellitus (HCC), Dialysis patient ([facility]), Fistula, GERD (gastroesophageal reflux disease), History of coronary artery stent placement, Hypercholesteremia, Hyperlipidemia, Hypertension, Infectious viral hepatitis, Limb alert care status, Neuropathy, Nonhealing ulcer of heel ([facility]) (04/25/2023), Obesity, Osteomyelitis ([facility]), Penetrating foot wound, left, initial encounter (01/19/2022), PVC's (premature ventricular contractions), Stroke ([facility]), TIA (transient ischemic attack) (10/28/2018), Ulcer of left heel, limited to breakdown of skin ([facility]) (06/13/2024), Wears dentures, and Wears glasses.    He is being evaluated via Telemedicine in follow up.     Active issues:  Ischemic cardiomyopathy  Chronic HF with mid-range EF (partially recovered to 45-50%) - Lowest EF 25% 1/30/2023.  Complex coronary artery disease - S/P multiple stents with history of in-stent stenosis and thrombosis on Plavix. Also with reported history of stent complications on Ticagrelor, although records are not available to support that. Has been maintained on Prasugrel despite history of CVA.   Moderate aortic stenosis  ESRD on HD - History of angina limiting dialysis sessions before his beta blocker was optimized.   History of DVT - Now chronically on Eliquis.   CVA     I first met Mr. Galloway in January 2023. He was admitted January 29, 2023. Prior to this, he had historically followed with a Cardiologist in [place] at [facility]. He was admitted in 1/2023 due to sudden onset of shortness of breath also with chest pressure/pain.  He was in acute respiratory failure requiring BiPAP in the ED  and was eventually transferred to the ICU for management of pulmonary edema. He underwent volume removal with renal replacement therapy. He was also found to have high sensitivity troponins at that time. An echocardiogram at that time showed reduction in LVEF to 25 to 30%.  Last known EF prior to this was reportedly normal.  He was transferred to our [facility] to undergo cardiac catheterization on 2/1/23, and was found to have progression of coronary artery disease compared to cardiac catheterization in May 2022.  He had in-stent stenosis of the LAD, for which he received PCI and placement of a ADE. He was also found to have an occluded OM stent (placed 5/2022), which could not be wired thus unable to revascularize.  Also with occlusion of the RPAV branch. He was initiated on medical therapy for heart failure, and discharged with a LifeVest. Subsequent echocardiogram showed improvement in LVEF with echo in February 2023 at [facility] showing EF of 35 to 40%.  Repeat echo within our network on 4/24/2023 showed EF of 35%. He was referred EP for ICD evaluation, but this was ultimately deferred due to high risk of infection in the setting of hemodialysis and possible immunosuppression when he gets renal transplant. Lower extremity osteomyelitis also raised concern for a device infection.     GDMT presently consists of Toprol XL 50mg BID, and Entresto 24-26mg once daily.      EKG 7/17/2024  Sinus rhythm with 1st degree A-V block  Right bundle branch block ( ms)     Cardiac Catheterization 2/1/23    1st Mrg lesion is 100% stenosed.    RPAV lesion is 100% stenosed.    Mid LAD lesion is 80% stenosed.    RPDA lesion is 60% stenosed.    The angioplasty was pre-stent angioplasty.    The angioplasty was pre-stent angioplasty.     Multivessel CAD with marked progression since the prior angiographic study in May 2022.  Stents placed in the mid LAD exhibited significant discrete and-stent  restenosis.  The first OM branch, stented in 5/22, has become occluded.  The distal RCA beyond the PDA has akso become occluded.  LAD in--stent restenosis was interrogated by IVUS imaging and successfully treated with placement of a 4.0x13mm Orsiro ADE.  An attempt was made to wire the occluded OM branch, but the wire could not be advanced beyond the stent in mid vessel.      Nuclear Stress 4/14/2022    Stress ECG: A pharmacological stress test was performed using regadenoson. The patient experienced no angina during the test.    Stress ECG: The stress ECG is negative for ischemia after pharmacologic stress.    Perfusion: There is a left ventricular perfusion defect that is medium in size with severe reduction in uptake present in the basal to mid inferior and inferolateral location(s) consistent with prior scar.    Stress Function: Left ventricular function post-stress is normal. Post-stress ejection fraction is 46 %. There is a defect in the basal to mid inferior and inferolateral location(s).    Stress Combined Conclusion: Findings are consistent with inferolateral infarction. There is mild reversibility to this defect, suggesting a small amount of mona-infarct ischemia in the affected territories.        Today, he is being evaluated in follow-up via telemedicine.  The patient was scheduled to come in office, but due to family obligations was unable to have a ride to come into the office.  He tells me that he has been doing very well overall, with no active cardiopulmonary complaints.  Has been following also with a cardiac team in [place], and has seen a structural cardiologist there.  He is presently undergoing TAVR evaluation so that he may receive a TAVR prosthesis for his moderate aortic stenosis prior to being enlisted for renal transplant.  This workup overall has been recommended by his transplant team, due to concerns that he may potentially risk his future transplant kidney if he were to undergo TAVR  in the future.    Recently admitted in [place], underwent PCI to the proximal LAD via drug-coated balloon angioplasty of in-stent restenosis of his prior proximal LAD stent.  Effient dose was increased from 5 mg daily to 10 mg daily.  Noted to have an LVEF of 35% on transthoracic echo at that time in January 2025, subsequent echo in March shows a similar LVEF.  He has been off of metoprolol Entresto since stenting due to hypotension noted on hemodialysis.       ROS    ROS: Pertinent positives and negatives as described in History of Present Illness. Remainder of a 14 point review of systems was negative.     No Known Allergies     Current Outpatient Medications on File Prior to Visit   Medication Sig Dispense Refill    Apixaban Starter Pack 5 MG TBPK Take-Home Kit dispensed by provider to patient in hospital setting. Use as directed.      aspirin 81 mg chewable tablet Chew 81 mg daily      atorvastatin (LIPITOR) 40 mg tablet TAKE 1 TABLET BY MOUTH DAILY WITH DINNER 90 tablet 1    cinacalcet (SENSIPAR) 60 MG tablet Take 2 tablets (120 mg total) by mouth 3 (three) times a week 24 tablet 0    divalproex sodium (DEPAKOTE SPRINKLE) 125 MG capsule Take 2 capsules (250 mg total) by mouth every 12 (twelve) hours (Patient not taking: Reported on 3/17/2025) 360 capsule 1    metoprolol succinate (TOPROL-XL) 25 mg 24 hr tablet TAKE 1 TABLET BY MOUTH TWICE A  tablet 1    MIDODRINE HCL PO Take 5 mg by mouth PRN w/ dialysis      prasugrel (EFFIENT) tablet TAKE 1 TABLET (5 MG TOTAL) BY MOUTH DAILY. (Patient taking differently: Take 10 mg by mouth daily) 90 tablet 1    sacubitril-valsartan (Entresto) 24-26 MG TABS Take 1 tablet by mouth in the morning 30 tablet 5    Sevelamer Carbonate 2.4 g PACK VIGOROUSLY MIX CONTENTS OF 1 PACKET IN WATER (IT WILL NOT DISSOLVE) AND DRINK 3 TIMES DAILY WITH MEALS      Vitamin D3 1.25 MG (38743 UT) capsule TAKE 1 CAPSULE BY MOUTH ONE TIME PER WEEK 12 capsule 4     No current  facility-administered medications on file prior to visit.       Past Medical History:   Diagnosis Date    Cerebrovascular accident (CVA) due to thrombosis of left middle cerebral artery (HCC) 07/29/2018    Chronic kidney disease     Coronary artery disease     Diabetes mellitus (HCC)     not on meds    Dialysis patient ([facility])     M-W-F    Fistula     left upper arm for hemodialyis    GERD (gastroesophageal reflux disease)     History of coronary artery stent placement     x3    Hypercholesteremia     Hyperlipidemia     Hypertension     Infectious viral hepatitis     B as child    Limb alert care status     no BP/IV left arm    Neuropathy     Nonhealing ulcer of heel (HCC) 04/25/2023    Obesity     Osteomyelitis ([facility])     last assessed 11/4/16-per son not currently    Penetrating foot wound, left, initial encounter 01/19/2022    PVC's (premature ventricular contractions)     sees  Cardio    Stroke ([facility])     last weeof July 2018 [facility]    TIA (transient ischemic attack) 10/28/2018    Ulcer of left heel, limited to breakdown of skin ([facility]) 06/13/2024    Wears dentures     Wears glasses        Past Surgical History:   Procedure Laterality Date    ABDOMINAL SURGERY      CARDIAC CATHETERIZATION N/A 05/02/2022    Procedure: Cardiac Coronary Angiogram;  Surgeon: Sam Davis MD;  [facility]: [facility];  Service: Cardiology    CARDIAC CATHETERIZATION N/A 05/02/2022    Procedure: Cardiac pci;  Surgeon: Sam Davis MD;  [facility];  Service: Cardiology    CARDIAC CATHETERIZATION  02/01/2023    Procedure: Cardiac catheterization;  Surgeon: Sam Davis MD;  [facility]: [facility];  Service: Cardiology    CARDIAC CATHETERIZATION N/A 02/01/2023    Procedure: Cardiac pci;  Surgeon: Sam Davis MD;  [facility];  Service: Cardiology    CARDIAC CATHETERIZATION N/A 02/01/2023    Procedure: Cardiac Coronary Angiogram;  Surgeon: Sam Davis MD;  Location: [facility]  "CARDIAC CATH LAB;  Service: Cardiology    CARDIAC CATHETERIZATION N/A 02/01/2023    Procedure: Cardiac other-IVUS;  Surgeon: Sam Davis MD;  Location: [facility];  Service: Cardiology    CHOLECYSTECTOMY      Percutaneous    COLONOSCOPY      CORONARY ANGIOPLASTY WITH STENT PLACEMENT      CYSTOSCOPY      HEMODIALYSIS ADULT  1/22/2024    HEMODIALYSIS ADULT  01/24/2024    IR LOWER EXTREMITY ANGIOGRAM  9/29/2023    IR LOWER EXTREMITY ANGIOGRAM  02/26/2024    OTHER SURGICAL HISTORY      \"stimulator to control bowel movements\"    UT ESOPHAGOGASTRODUODENOSCOPY TRANSORAL DIAGNOSTIC N/A 09/27/2016    Procedure: ESOPHAGOGASTRODUODENOSCOPY (EGD);  Surgeon: Adele Rowe MD;  Location: [facility] GI LAB;  Service: Gastroenterology    UT LAPAROSCOPY SURG CHOLECYSTECTOMY N/A 02/29/2016    Procedure: LAPAROSCOPIC CHOLECYSTECTOMY ;  Surgeon: Cliff Roman DO;  Location: [facility] Main OR;  Service: General    UT SLCTV CATHJ 3RD+ ORD SLCTV ABDL PEL/LXTR BRNCH Left 9/29/2023    Procedure: Left leg angiogram with intervention;  Surgeon: Michelle Galvan MD;  Location: [facility] MAIN [facility];  Service: Vascular    UT SLCTV CATHJ 3RD+ ORD SLCTV ABDL PEL/LXTR BRNCH Left 02/26/2024    Procedure: Left leg angiogram antegrade access, left popliteal angioplasty;  Surgeon: Michelle Galvan MD;  Location: [facility] MA[facility];  Service: Vascular    ROTATOR CUFF REPAIR Right     SPINAL CORD STIMULATOR IMPLANT      \"Medtronic interstim model # 3058- in lower back to control bowel movements-currently turned off-battery is dead\"    TOE AMPUTATION Right 10/28/2016    Procedure: 3RD TOE AMPUTATION ;  Surgeon: Anjel Salas DPM;  Location: [facility];  Service:        Family History   Problem Relation Age of Onset    Leukemia Mother     Liver disease Mother     Lung cancer Mother         heavy smoker - 3 ppd    Heart disease Father     Liver disease Father     Diabetes type I Father     Multiple myeloma Sister     Breast cancer Sister     Urolithiasis Family  "    Alcohol abuse Neg Hx     Depression Neg Hx     Drug abuse Neg Hx     Substance Abuse Neg Hx     Mental illness Neg Hx        Social History     Socioeconomic History    Marital status: /Civil Union     Spouse name: Not on file    Number of children: Not on file    Years of education: Not on file    Highest education level: Not asked   Occupational History    Occupation: disabled   Tobacco Use    Smoking status: Never    Smokeless tobacco: Never   Vaping Use    Vaping status: Never Used   Substance and Sexual Activity    Alcohol use: Never    Drug use: No    Sexual activity: Not Currently     Partners: Female     Comment: defer   Other Topics Concern    Not on file   Social History Narrative    Daily caffeine consumption 2-3 servings a day     Social Drivers of Health     Financial Resource Strain: Patient Declined (7/13/2023)    Overall Financial Resource Strain (CARDIA)     Difficulty of Paying Living Expenses: Patient declined   Food Insecurity: No Food Insecurity (3/3/2025)    Received from [facility], [facility], and [facility]    Hunger Vital Sign     Worried About Running Out of Food in the Last Year: Never true     Ran Out of Food in the Last Year: Never true   Transportation Needs: Unknown (3/3/2025)    Received from [facility], [facility], and [facility]    PRAPARE - Transportation     Lack of Transportation (Medical): No     Lack of Transportation (Non-Medical): Not on file   Physical Activity: Not on file   Stress: No Stress Concern Present (1/18/2019)    [employer] [place] [employer] - Occupational Stress Questionnaire     Feeling of Stress : Only a little   Social Connections: Unknown (1/18/2019)    Social Connection and Isolation Panel [NHANES]     Frequency of Communication with Friends and Family: Not on file     Frequency of Social Gatherings with Friends and Family: Not on file     Attends  Church Services: Not on file     Active Member of Clubs or Organizations: Not on file     Attends Club or Organization Meetings: Not on file     Marital Status:    Intimate Partner Violence: Not At Risk (1/18/2019)    Humiliation, Afraid, Rape, and Kick questionnaire     Fear of Current or Ex-Partner: No     Emotionally Abused: No     Physically Abused: No     Sexually Abused: No   Housing Stability: Unknown (3/3/2025)    Received from [facility], [facility], and [facility]    Housing Stability Vital Sign     Unable to Pay for Housing in the Last Year: Not on file     Number of Times Moved in the Last Year: Not on file     Homeless in the Last Year: No       OBJECTIVE:    There were no vitals taken for this visit.     BP Readings from Last 3 Encounters:   03/19/25 110/60   03/17/25 115/56   02/19/25 111/55       Wt Readings from Last 3 Encounters:   03/19/25 98.4 kg (217 lb)   02/19/25 96.2 kg (212 lb)   01/28/25 94.8 kg (209 lb)         Physical Exam  Constitutional:       General: He is not in acute distress.     Appearance: He is not ill-appearing.   Neurological:      Mental Status: He is alert.                                                           LABS:  Lab Results   Component Value Date    GLUCOSE 96 07/17/2024    BUN 41 (H) 03/17/2025    CREATININE 7.33 (H) 03/17/2025    CALCIUM 8.3 (L) 03/17/2025     08/10/2016    K 4.4 03/17/2025    CO2 26 03/17/2025    CL 97 03/17/2025    ALKPHOS 122 (H) 03/17/2025    BILITOT 0.6 08/10/2016    PROT 6.7 08/10/2016    AST 12 (L) 03/17/2025    ALT 7 03/17/2025    ANIONGAP 9 01/03/2016        Lab Results   Component Value Date    WBC 5.40 03/17/2025    HGB 10.0 (L) 03/17/2025    HCT 32.3 (L) 03/17/2025     (H) 03/17/2025     (L) 03/17/2025       Lab Results   Component Value Date    CHOL 203 (H) 08/10/2016    HDL 30 (L) 01/30/2023    LDLCALC 21 01/30/2023    TRIG 123 01/30/2023       Lab Results  "  Component Value Date    HGBA1C 5.5 11/07/2024       No results found for: \"TSH\"        ASSESSMENT/PLAN:  Diagnoses and all orders for this visit:    CAD, multiple vessel    Chest pain  -     Ambulatory Referral to Cardiology    Status post insertion of drug eluting coronary artery stent  -     prasugrel (EFFIENT) tablet; Take 1 tablet (10 mg total) by mouth daily    Chronic systolic heart failure (HCC)  -     Echo follow up/limited w/ contrast if indicated; Future    Ischemic cardiomyopathy  -     prasugrel (EFFIENT) tablet; Take 1 tablet (10 mg total) by mouth daily  -     Echo follow up/limited w/ contrast if indicated; Future    Nonrheumatic aortic valve stenosis    ESRD on dialysis (HCC)    Pre-kidney transplant, listed    Mixed hyperlipidemia  -     atorvastatin (LIPITOR) 40 mg tablet; Take 1 tablet (40 mg total) by mouth daily with dinner    History of DVT (deep vein thrombosis)    Primary hypertension    Antiplatelet or antithrombotic long-term use    Mixed hyperlipidemia  Comments:  taking statin and lipids at goal, c/w rx  Orders:  -     atorvastatin (LIPITOR) 40 mg tablet; Take 1 tablet (40 mg total) by mouth daily with dinner    This is a 65-year-old male patient very well-known to me from prior encounters, presenting for follow-up evaluation.  His chronic cardiac conditions are presently stable, he has no anginal complaints and he is clinically euvolemic from a heart failure standpoint, NYHA class II although functional capacity is fairly difficult to ascertain due to baseline ambulatory dysfunction. He is undergoing TAVR evaluation in [place] as noted above.      Plan:  Continue Aspirin, Eliquis, and Prasugrel given recent PCI.  Continue Toprol XL 25mg QD  Continue Entresto once daily.   Continue high intensity statin.          Britney Tan MD [facility]      Portions of the record may have been created with voice recognition software. Occasional wrong word or \"sound alike\" substitutions may have occurred due " to the inherent limitations of voice recognition software. Please review the chart carefully and recognize, using context, where substitutions/typographical errors may have occurred.     Administrative Statements   Encounter provider Britney Tan MD    The Patient is located at Other (inpatient rehab) and in the following state in which I hold an active license [place].    The patient was identified by name and date of birth. Asad Galloway was informed that this is a telemedicine visit and that the visit is being conducted through the Epic Embedded platform. He agrees to proceed.  Telemedicine was specifically requested by the patient as he was unable to make it into the office today.  He was evaluated directly throughout this encounter by the epic embedded video platform.  My office door was closed. No one else was in the room.  He acknowledged consent and understanding of privacy and security of the video platform. The patient has agreed to participate and understands they can discontinue the visit at any time.    I have spent a total time of 22 minutes in caring for this patient on the day of the visit/encounter including Diagnostic results, Prognosis, Risks and benefits of tx options, Instructions for management, Patient and family education, Importance of tx compliance, Risk factor reductions, Impressions, Counseling / Coordination of care, and Documenting in the medical record, not including the time spent for establishing the audio/video connection.

## 2025-03-21 ENCOUNTER — TELEPHONE (OUTPATIENT)
Dept: OTHER | Facility: OTHER | Age: 65
End: 2025-03-21

## 2025-03-21 ENCOUNTER — TELEPHONE (OUTPATIENT)
Facility: MEDICAL CENTER | Age: 65
End: 2025-03-21

## 2025-03-21 ENCOUNTER — APPOINTMENT (EMERGENCY)
Dept: CT IMAGING | Facility: HOSPITAL | Age: 65
DRG: 314 | End: 2025-03-21
Payer: MEDICARE

## 2025-03-21 ENCOUNTER — HOSPITAL ENCOUNTER (INPATIENT)
Facility: HOSPITAL | Age: 65
LOS: 3 days | Discharge: HOME | DRG: 314 | End: 2025-03-24
Attending: EMERGENCY MEDICINE | Admitting: INTERNAL MEDICINE
Payer: MEDICARE

## 2025-03-21 ENCOUNTER — APPOINTMENT (INPATIENT)
Dept: DIALYSIS | Facility: HOSPITAL | Age: 65
DRG: 314 | End: 2025-03-21
Payer: MEDICARE

## 2025-03-21 ENCOUNTER — APPOINTMENT (EMERGENCY)
Dept: RADIOLOGY | Facility: HOSPITAL | Age: 65
DRG: 314 | End: 2025-03-21
Payer: MEDICARE

## 2025-03-21 ENCOUNTER — APPOINTMENT (OUTPATIENT)
Facility: CLINIC | Age: 65
End: 2025-03-21
Payer: MEDICARE

## 2025-03-21 DIAGNOSIS — I95.9 HYPOTENSION: Primary | ICD-10-CM

## 2025-03-21 DIAGNOSIS — R79.89 ELEVATED TROPONIN: ICD-10-CM

## 2025-03-21 DIAGNOSIS — N18.6 ESRD ON DIALYSIS (HCC): ICD-10-CM

## 2025-03-21 DIAGNOSIS — I63.9 STROKE (HCC): ICD-10-CM

## 2025-03-21 DIAGNOSIS — G93.40 ACUTE ENCEPHALOPATHY: ICD-10-CM

## 2025-03-21 DIAGNOSIS — R46.89 AGGRESSIVE BEHAVIOR: ICD-10-CM

## 2025-03-21 DIAGNOSIS — Z99.2 ESRD ON DIALYSIS (HCC): ICD-10-CM

## 2025-03-21 DIAGNOSIS — T68.XXXA HYPOTHERMIA, INITIAL ENCOUNTER: ICD-10-CM

## 2025-03-21 DIAGNOSIS — I47.29 NONSUSTAINED VENTRICULAR TACHYCARDIA (HCC): ICD-10-CM

## 2025-03-21 PROBLEM — D63.1 ANEMIA DUE TO CHRONIC KIDNEY DISEASE, ON CHRONIC DIALYSIS (HCC): Status: ACTIVE | Noted: 2025-03-21

## 2025-03-21 PROBLEM — R41.82 ALTERED MENTAL STATUS: Status: RESOLVED | Noted: 2025-03-21 | Resolved: 2025-03-21

## 2025-03-21 PROBLEM — M89.9 CHRONIC KIDNEY DISEASE-MINERAL AND BONE DISORDER: Status: ACTIVE | Noted: 2025-03-21

## 2025-03-21 PROBLEM — N18.9 CHRONIC KIDNEY DISEASE-MINERAL AND BONE DISORDER: Status: ACTIVE | Noted: 2025-03-21

## 2025-03-21 PROBLEM — R41.82 ALTERED MENTAL STATUS: Status: ACTIVE | Noted: 2025-03-21

## 2025-03-21 PROBLEM — E83.9 CHRONIC KIDNEY DISEASE-MINERAL AND BONE DISORDER: Status: ACTIVE | Noted: 2025-03-21

## 2025-03-21 PROBLEM — I50.20 HFREF (HEART FAILURE WITH REDUCED EJECTION FRACTION) (HCC): Status: ACTIVE | Noted: 2025-03-21

## 2025-03-21 PROBLEM — R65.10 SIRS (SYSTEMIC INFLAMMATORY RESPONSE SYNDROME) (HCC): Status: ACTIVE | Noted: 2025-03-21

## 2025-03-21 PROBLEM — R41.82 AMS (ALTERED MENTAL STATUS): Status: ACTIVE | Noted: 2025-03-21

## 2025-03-21 LAB
2HR DELTA HS TROPONIN: -30 NG/L
4HR DELTA HS TROPONIN: -11 NG/L
ALBUMIN SERPL BCG-MCNC: 4 G/DL (ref 3.5–5)
ALP SERPL-CCNC: 117 U/L (ref 34–104)
ALT SERPL W P-5'-P-CCNC: 9 U/L (ref 7–52)
ANION GAP SERPL CALCULATED.3IONS-SCNC: 13 MMOL/L (ref 4–13)
APTT PPP: 31 SECONDS (ref 23–34)
AST SERPL W P-5'-P-CCNC: 15 U/L (ref 13–39)
BASOPHILS # BLD AUTO: 0.03 THOUSANDS/ÂΜL (ref 0–0.1)
BASOPHILS NFR BLD AUTO: 1 % (ref 0–1)
BILIRUB SERPL-MCNC: 0.64 MG/DL (ref 0.2–1)
BNP SERPL-MCNC: 2769 PG/ML (ref 0–100)
BUN SERPL-MCNC: 38 MG/DL (ref 5–25)
CALCIUM SERPL-MCNC: 8.6 MG/DL (ref 8.4–10.2)
CARDIAC TROPONIN I PNL SERPL HS: 216 NG/L (ref ?–50)
CARDIAC TROPONIN I PNL SERPL HS: 235 NG/L (ref ?–50)
CARDIAC TROPONIN I PNL SERPL HS: 246 NG/L (ref ?–50)
CHLORIDE SERPL-SCNC: 95 MMOL/L (ref 96–108)
CO2 SERPL-SCNC: 27 MMOL/L (ref 21–32)
CREAT SERPL-MCNC: 6.02 MG/DL (ref 0.6–1.3)
EOSINOPHIL # BLD AUTO: 0.11 THOUSAND/ÂΜL (ref 0–0.61)
EOSINOPHIL NFR BLD AUTO: 3 % (ref 0–6)
ERYTHROCYTE [DISTWIDTH] IN BLOOD BY AUTOMATED COUNT: 17.8 % (ref 11.6–15.1)
FLUAV AG UPPER RESP QL IA.RAPID: NEGATIVE
FLUBV AG UPPER RESP QL IA.RAPID: NEGATIVE
GFR SERPL CREATININE-BSD FRML MDRD: 8 ML/MIN/1.73SQ M
GLUCOSE SERPL-MCNC: 111 MG/DL (ref 65–140)
GLUCOSE SERPL-MCNC: 116 MG/DL (ref 65–140)
HCT VFR BLD AUTO: 32.2 % (ref 36.5–49.3)
HGB BLD-MCNC: 10.1 G/DL (ref 12–17)
IMM GRANULOCYTES # BLD AUTO: 0.03 THOUSAND/UL (ref 0–0.2)
IMM GRANULOCYTES NFR BLD AUTO: 1 % (ref 0–2)
INR PPP: 1.05 (ref 0.85–1.19)
LACTATE SERPL-SCNC: 2 MMOL/L (ref 0.5–2)
LYMPHOCYTES # BLD AUTO: 0.8 THOUSANDS/ÂΜL (ref 0.6–4.47)
LYMPHOCYTES NFR BLD AUTO: 18 % (ref 14–44)
MAGNESIUM SERPL-MCNC: 2 MG/DL (ref 1.9–2.7)
MCH RBC QN AUTO: 31.5 PG (ref 26.8–34.3)
MCHC RBC AUTO-ENTMCNC: 31.4 G/DL (ref 31.4–37.4)
MCV RBC AUTO: 100 FL (ref 82–98)
MONOCYTES # BLD AUTO: 0.41 THOUSAND/ÂΜL (ref 0.17–1.22)
MONOCYTES NFR BLD AUTO: 9 % (ref 4–12)
NEUTROPHILS # BLD AUTO: 3.1 THOUSANDS/ÂΜL (ref 1.85–7.62)
NEUTS SEG NFR BLD AUTO: 68 % (ref 43–75)
NRBC BLD AUTO-RTO: 0 /100 WBCS
PLATELET # BLD AUTO: 100 THOUSANDS/UL (ref 149–390)
PLATELET # BLD AUTO: 117 THOUSANDS/UL (ref 149–390)
PMV BLD AUTO: 11 FL (ref 8.9–12.7)
PMV BLD AUTO: 11.6 FL (ref 8.9–12.7)
POTASSIUM SERPL-SCNC: 4.8 MMOL/L (ref 3.5–5.3)
PROCALCITONIN SERPL-MCNC: 0.24 NG/ML
PROT SERPL-MCNC: 6.9 G/DL (ref 6.4–8.4)
PROTHROMBIN TIME: 14.4 SECONDS (ref 12.3–15)
RBC # BLD AUTO: 3.21 MILLION/UL (ref 3.88–5.62)
SARS-COV+SARS-COV-2 AG RESP QL IA.RAPID: NEGATIVE
SODIUM SERPL-SCNC: 135 MMOL/L (ref 135–147)
T4 FREE SERPL-MCNC: 0.69 NG/DL (ref 0.61–1.12)
TSH SERPL DL<=0.05 MIU/L-ACNC: 6.13 UIU/ML (ref 0.45–4.5)
WBC # BLD AUTO: 4.48 THOUSAND/UL (ref 4.31–10.16)

## 2025-03-21 PROCEDURE — 99223 1ST HOSP IP/OBS HIGH 75: CPT | Performed by: PSYCHIATRY & NEUROLOGY

## 2025-03-21 PROCEDURE — 70450 CT HEAD/BRAIN W/O DYE: CPT

## 2025-03-21 PROCEDURE — 87804 INFLUENZA ASSAY W/OPTIC: CPT

## 2025-03-21 PROCEDURE — 71045 X-RAY EXAM CHEST 1 VIEW: CPT

## 2025-03-21 PROCEDURE — 85049 AUTOMATED PLATELET COUNT: CPT

## 2025-03-21 PROCEDURE — 99223 1ST HOSP IP/OBS HIGH 75: CPT | Performed by: INTERNAL MEDICINE

## 2025-03-21 PROCEDURE — 84443 ASSAY THYROID STIM HORMONE: CPT

## 2025-03-21 PROCEDURE — 96366 THER/PROPH/DIAG IV INF ADDON: CPT

## 2025-03-21 PROCEDURE — 84439 ASSAY OF FREE THYROXINE: CPT

## 2025-03-21 PROCEDURE — 85730 THROMBOPLASTIN TIME PARTIAL: CPT

## 2025-03-21 PROCEDURE — 83880 ASSAY OF NATRIURETIC PEPTIDE: CPT

## 2025-03-21 PROCEDURE — 87081 CULTURE SCREEN ONLY: CPT

## 2025-03-21 PROCEDURE — 96367 TX/PROPH/DG ADDL SEQ IV INF: CPT

## 2025-03-21 PROCEDURE — 87040 BLOOD CULTURE FOR BACTERIA: CPT

## 2025-03-21 PROCEDURE — 84145 PROCALCITONIN (PCT): CPT

## 2025-03-21 PROCEDURE — 85610 PROTHROMBIN TIME: CPT

## 2025-03-21 PROCEDURE — 82948 REAGENT STRIP/BLOOD GLUCOSE: CPT

## 2025-03-21 PROCEDURE — 83605 ASSAY OF LACTIC ACID: CPT

## 2025-03-21 PROCEDURE — 99222 1ST HOSP IP/OBS MODERATE 55: CPT | Performed by: INTERNAL MEDICINE

## 2025-03-21 PROCEDURE — 93005 ELECTROCARDIOGRAM TRACING: CPT

## 2025-03-21 PROCEDURE — 99285 EMERGENCY DEPT VISIT HI MDM: CPT

## 2025-03-21 PROCEDURE — 87811 SARS-COV-2 COVID19 W/OPTIC: CPT

## 2025-03-21 PROCEDURE — 99223 1ST HOSP IP/OBS HIGH 75: CPT | Performed by: STUDENT IN AN ORGANIZED HEALTH CARE EDUCATION/TRAINING PROGRAM

## 2025-03-21 PROCEDURE — 83735 ASSAY OF MAGNESIUM: CPT

## 2025-03-21 PROCEDURE — 99291 CRITICAL CARE FIRST HOUR: CPT | Performed by: EMERGENCY MEDICINE

## 2025-03-21 PROCEDURE — 71250 CT THORAX DX C-: CPT

## 2025-03-21 PROCEDURE — 96365 THER/PROPH/DIAG IV INF INIT: CPT

## 2025-03-21 PROCEDURE — 80053 COMPREHEN METABOLIC PANEL: CPT

## 2025-03-21 PROCEDURE — 36415 COLL VENOUS BLD VENIPUNCTURE: CPT

## 2025-03-21 PROCEDURE — 74176 CT ABD & PELVIS W/O CONTRAST: CPT

## 2025-03-21 PROCEDURE — 84484 ASSAY OF TROPONIN QUANT: CPT

## 2025-03-21 PROCEDURE — 85025 COMPLETE CBC W/AUTO DIFF WBC: CPT

## 2025-03-21 RX ORDER — ASPIRIN 81 MG/1
324 TABLET, CHEWABLE ORAL ONCE
Status: DISCONTINUED | OUTPATIENT
Start: 2025-03-21 | End: 2025-03-21

## 2025-03-21 RX ORDER — ASPIRIN 81 MG/1
81 TABLET, CHEWABLE ORAL DAILY
Status: DISCONTINUED | OUTPATIENT
Start: 2025-03-21 | End: 2025-03-24 | Stop reason: HOSPADM

## 2025-03-21 RX ORDER — METOPROLOL SUCCINATE 25 MG/1
25 TABLET, EXTENDED RELEASE ORAL DAILY
Status: DISCONTINUED | OUTPATIENT
Start: 2025-03-21 | End: 2025-03-24 | Stop reason: HOSPADM

## 2025-03-21 RX ORDER — HEPARIN SODIUM 5000 [USP'U]/ML
5000 INJECTION, SOLUTION INTRAVENOUS; SUBCUTANEOUS EVERY 8 HOURS SCHEDULED
Status: DISCONTINUED | OUTPATIENT
Start: 2025-03-21 | End: 2025-03-24 | Stop reason: HOSPADM

## 2025-03-21 RX ORDER — VANCOMYCIN HYDROCHLORIDE 750 MG/150ML
10 INJECTION, SOLUTION INTRAVENOUS
Status: DISCONTINUED | OUTPATIENT
Start: 2025-03-21 | End: 2025-03-22

## 2025-03-21 RX ORDER — ATORVASTATIN CALCIUM 40 MG/1
40 TABLET, FILM COATED ORAL
Status: DISCONTINUED | OUTPATIENT
Start: 2025-03-21 | End: 2025-03-24 | Stop reason: HOSPADM

## 2025-03-21 RX ORDER — PRASUGREL 10 MG/1
10 TABLET, FILM COATED ORAL DAILY
Status: DISCONTINUED | OUTPATIENT
Start: 2025-03-21 | End: 2025-03-24 | Stop reason: HOSPADM

## 2025-03-21 RX ORDER — MIDODRINE HYDROCHLORIDE 5 MG/1
10 TABLET ORAL
Status: DISCONTINUED | OUTPATIENT
Start: 2025-03-21 | End: 2025-03-24 | Stop reason: HOSPADM

## 2025-03-21 RX ADMIN — HEPARIN SODIUM 5000 UNITS: 5000 INJECTION INTRAVENOUS; SUBCUTANEOUS at 14:34

## 2025-03-21 RX ADMIN — MIDODRINE HYDROCHLORIDE 10 MG: 5 TABLET ORAL at 14:31

## 2025-03-21 RX ADMIN — VANCOMYCIN HYDROCHLORIDE 750 MG: 750 INJECTION, SOLUTION INTRAVENOUS at 18:00

## 2025-03-21 RX ADMIN — ATORVASTATIN CALCIUM 40 MG: 40 TABLET, FILM COATED ORAL at 18:12

## 2025-03-21 RX ADMIN — SACUBITRIL AND VALSARTAN 1 TABLET: 24; 26 TABLET, FILM COATED ORAL at 12:23

## 2025-03-21 RX ADMIN — VANCOMYCIN HYDROCHLORIDE 2000 MG: 5 INJECTION, POWDER, LYOPHILIZED, FOR SOLUTION INTRAVENOUS at 05:51

## 2025-03-21 RX ADMIN — SODIUM CHLORIDE 250 ML: 0.9 INJECTION, SOLUTION INTRAVENOUS at 07:11

## 2025-03-21 RX ADMIN — HEPARIN SODIUM 5000 UNITS: 5000 INJECTION INTRAVENOUS; SUBCUTANEOUS at 22:17

## 2025-03-21 RX ADMIN — ASPIRIN 81 MG CHEWABLE TABLET 81 MG: 81 TABLET CHEWABLE at 12:23

## 2025-03-21 RX ADMIN — SODIUM CHLORIDE 250 ML: 0.9 INJECTION, SOLUTION INTRAVENOUS at 05:13

## 2025-03-21 RX ADMIN — CEFEPIME 2000 MG: 2 INJECTION, POWDER, FOR SOLUTION INTRAVENOUS at 05:16

## 2025-03-21 NOTE — ED PROVIDER NOTES
Time reflects when diagnosis was documented in both MDM as applicable and the Disposition within this note       Time User Action Codes Description Comment    3/21/2025  5:55 AM Rayo Lopez Add [R46.89] Aggressive behavior     3/21/2025  5:55 AM JessicaRayo Add [I95.9] Hypotension     3/21/2025  5:55 AM JessicaRayo Add [T68.XXXA] Hypothermia, initial encounter     3/21/2025  5:57 AM JessicaZiyad stinsonirma Add [R79.89] Elevated troponin     3/21/2025  7:44 AM Ziyad Lopezirma Add [N18.6,  Z99.2] ESRD on dialysis (HCC)     3/21/2025  8:13 AM Awais Machado Modify [R46.89] Aggressive behavior     3/21/2025  8:13 AM Awais Machado Modify [I95.9] Hypotension     3/21/2025  8:15 AM Awais Machado Add [I47.29] Nonsustained ventricular tachycardia (HCC)           ED Disposition       ED Disposition   Admit    Condition   Fair    Date/Time   Fri Mar 21, 2025  8:13 AM    Comment   Case was discussed with Dr. Tovar and the patient's admission status was agreed to be  to the service of Dr. Tovar .               Assessment & Plan       Medical Decision Making  65-year-old male presenting to the ED for altered mental status and agitation.    Patient is tolerating send staff while they are cleaning him and we are performing exams.  Patient is agitated and yelling but not throwing or being dangerous.    Patient is cleaned in room and found to have a temp of 95.8.  Given altered mental status as well, will conduct a sepsis workup and get CT head CT chest CT abdomen and CT pelvis without contrast.  Patient is on dialysis and receives dialysis Monday Wednesday Friday.    Will start IV fluids as well as a Bob hugger for warming.  Will place a temp probe Bo.    See ED course for interpretation of results and progression of treatments.  As labs return, will consider broad-spectrum biotics however at this time no worse or SIRS criteria.    Following workup, patient ultimately started on  antibiotics with multiple rounds of fluid boluses.  Patient admitted to stepdown two for continued management.    Amount and/or Complexity of Data Reviewed  Labs: ordered. Decision-making details documented in ED Course.  Radiology: ordered and independent interpretation performed.    Risk  OTC drugs.  Decision regarding hospitalization.        ED Course as of 03/21/25 0900   Fri Mar 21, 2025   0422 Dialysis MWF   0511 CBC and differential(!)  Patient room white count, no leukocytosis.  Mild anemia.   0514 No gross blood on exam when cleaning patient.   0514 Patient became hypotensive after Bob hugger.  Starting 250 bolus of saline.  Will have Levophed ordered but not started until reevaluation.  Patient will be started on cefepime and vancomycin.   0523 Blood pressures improving after 250 cc bolus.   0526 Last echo completed per my chart review was prestent placement.  Patient was post to have an echo follow-up yesterday but is not in the system.   0540 hs TnI 0hr(!): 246  Elevated, will require delta.  EKG noted, appears similar to previous   0541 Comprehensive metabolic panel(!)  Sodium and potassium within normal limits.  Kidney function noted, patient gets dialysis today.  Liver function appears normal.   0541 LACTIC ACID: 2.0  Reassuring   0541 MAGNESIUM: 2.0  Reassuring   0541 Procalcitonin: 0.24  Reassuring   0547 TSH 3RD GENERATON(!): 6.133  Elevated, will need further workup inpatient.   0555 SARS COV Rapid Antigen: Negative   0555 Influenza A Rapid Antigen: Negative   0555 Influenza B Rapid Antigen: Negative   0656 BNP(!): 2,769  Elevated but improved from previous   0711 30-50 fluid bolus given due to persistent hypotension.  Will reach out to critical care for further evaluation.   0724 Patient appears to have gone into a run of V. tach for approximately 7 beats and then converted.   0729 Patient complaining of chest pain, repeating EKG and giving aspirin.   0732 Iban EKG does not show any acute  changes from previous.  Still notes the depressions noted in the first EKG as well as the right bundle branch block.  1 PAC is noted but I did not see throughout.   0745 Critical care at bedside evaluating patient.  I sent a message out to nephrology to discuss preparing for dialysis today.  Nephrology asked for consult placed and will evaluate patient.   0745 Critical care states patient is okay to go to inpatient medicine.  Will reach out to IM.   0832 IMPRESSION:     Compared to prior exam there is a new hypodensity in the right hemipons with sparing of the periphery. Although this may be artifactual, if there is clinical concern for an acute infarct, further evaluation with a brain MRI is recommended.              Workstation performed: [id number]     0832 hs TnI 2hr(!): 216  reassuring   0900 IMPRESSION:     1.  No acute abnormality in the chest, abdomen, or pelvis  2.  Multiple prominent mediastinal lymph nodes, which are nonspecific and may be reactive. Consider follow-up chest CT after resolution of patient's current symptoms.  3.  Likely hepatic cirrhosis with findings of portal hypertension, including splenomegaly and ascites.           Workstation performed: [id number]         Medications   NOREPINEPHRINE 4 MG  ML NSS (CMPD ORDER) infusion (0 mcg/min Intravenous Hold 3/21/25 0521)   aspirin chewable tablet 324 mg (0 mg Oral Hold 3/21/25 0735)   cefepime (MAXIPIME) 2 g/50 mL dextrose IVPB (0 mg Intravenous Stopped 3/21/25 0549)   vancomycin (VANCOCIN) 2,000 mg in sodium chloride 0.9 % 500 mL IVPB (0 mg Intravenous Stopped 3/21/25 0751)   sodium chloride 0.9 % bolus 250 mL (0 mL Intravenous Stopped 3/21/25 0552)   sodium chloride 0.9 % bolus 250 mL (0 mL Intravenous Stopped 3/21/25 0811)       ED Risk Strat Scores   HEART Risk Score      Flowsheet Row Most Recent Value   Heart Score Risk Calculator    History 0 Filed at: 03/21/2025 0557   ECG 2 Filed at: 03/21/2025 0557   Age 2 Filed at: 03/21/2025 0557    Risk Factors 2 Filed at: 03/21/2025 0557   Troponin 2 Filed at: 03/21/2025 0557   HEART Score 8 Filed at: 03/21/2025 0557          HEART Risk Score      Flowsheet Row Most Recent Value   Heart Score Risk Calculator    History 0 Filed at: 03/21/2025 0557   ECG 2 Filed at: 03/21/2025 0557   Age 2 Filed at: 03/21/2025 0557   Risk Factors 2 Filed at: 03/21/2025 0557   Troponin 2 Filed at: 03/21/2025 0557   HEART Score 8 Filed at: 03/21/2025 0557                              SBIRT 20yo+      Flowsheet Row Most Recent Value   Initial Alcohol Screen: US AUDIT-C     1. How often do you have a drink containing alcohol? 0 Filed at: 03/21/2025 0420   2. How many drinks containing alcohol do you have on a typical day you are drinking?  0 Filed at: 03/21/2025 0420   3b. FEMALE Any Age, or MALE 65+: How often do you have 4 or more drinks on one occassion? 0 Filed at: 03/21/2025 0420   Audit-C Score 0 Filed at: 03/21/2025 0420   XIN: How many times in the past year have you...    Used an illegal drug or used a prescription medication for non-medical reasons? Never Filed at: 03/21/2025 0420                            History of Present Illness       Chief Complaint   Patient presents with    Aggressive Behavior     Pt from [facility] via EMS, pt physically aggressive with staff cursing, hitting and throwing things. Pt aggressive with EMS.       Past Medical History:   Diagnosis Date    Cerebrovascular accident (CVA) due to thrombosis of left middle cerebral artery (HCC) 07/29/2018    Chronic kidney disease     Coronary artery disease     Diabetes mellitus (HCC)     not on meds    Dialysis patient (HCC)     M-W-F    Fistula     left upper arm for hemodialyis    GERD (gastroesophageal reflux disease)     History of coronary artery stent placement     x3    Hypercholesteremia     Hyperlipidemia     Hypertension     Infectious viral hepatitis     B as child    Limb alert care status     no BP/IV left arm    Neuropathy      "Nonhealing ulcer of heel (HCC) 04/25/2023    Obesity     Osteomyelitis (HCC)     last assessed 11/4/16-per son not currently    Penetrating foot wound, left, initial encounter 01/19/2022    PVC's (premature ventricular contractions)     sees  Cardio    Stroke ([facility])     last weeof July 2018 [facility]    TIA (transient ischemic attack) 10/28/2018    Ulcer of left heel, limited to breakdown of skin ([facility]) 06/13/2024    Wears dentures     Wears glasses       Past Surgical History:   Procedure Laterality Date    ABDOMINAL SURGERY      CARDIAC CATHETERIZATION N/A 05/02/2022    Procedure: Cardiac Coronary Angiogram;  Surgeon: Sam Davis MD;  [facility];  Service: Cardiology    CARDIAC CATHETERIZATION N/A 05/02/2022    Procedure: Cardiac pci;  Surgeon: Sam Davis MD;  [facility]: [facility];  Service: Cardiology    CARDIAC CATHETERIZATION  02/01/2023    Procedure: Cardiac catheterization;  Surgeon: Sam Davis MD;  Location: [facility];  Service: Cardiology    CARDIAC CATHETERIZATION N/A 02/01/2023    Procedure: Cardiac pci;  Surgeon: Sam Davis MD;  [facility]: [facility];  Service: Cardiology    CARDIAC CATHETERIZATION N/A 02/01/2023    Procedure: Cardiac Coronary Angiogram;  Surgeon: Sam Davis MD;  [facility];  Service: Cardiology    CARDIAC CATHETERIZATION N/A 02/01/2023    Procedure: Cardiac other-IVUS;  Surgeon: Sam Davis MD;  Location: [facility];  Service: Cardiology    CHOLECYSTECTOMY      Percutaneous    COLONOSCOPY      CORONARY ANGIOPLASTY WITH STENT PLACEMENT      CYSTOSCOPY      HEMODIALYSIS ADULT  1/22/2024    HEMODIALYSIS ADULT  01/24/2024    IR LOWER EXTREMITY ANGIOGRAM  9/29/2023    IR LOWER EXTREMITY ANGIOGRAM  02/26/2024    OTHER SURGICAL HISTORY      \"stimulator to control bowel movements\"    KY ESOPHAGOGASTRODUODENOSCOPY TRANSORAL DIAGNOSTIC N/A 09/27/2016    Procedure: ESOPHAGOGASTRODUODENOSCOPY (EGD);  " "Surgeon: Adele Rowe MD;  Location: [facility] GI LAB;  Service: Gastroenterology    FL LAPAROSCOPY SURG CHOLECYSTECTOMY N/A 02/29/2016    Procedure: LAPAROSCOPIC CHOLECYSTECTOMY ;  Surgeon: Cliff Roman DO;  Location: [facility];  Service: General    FL SLCTV CATHJ 3RD+ ORD SLCTV ABDL PEL/LXTR BRNCH Left 9/29/2023    Procedure: Left leg angiogram with intervention;  Surgeon: Michelle Galvan MD;  Location: [facility];  Service: Vascular    FL SLCTV CATHJ 3RD+ ORD SLCTV ABDL PEL/LXTR BRNCH Left 02/26/2024    Procedure: Left leg angiogram antegrade access, left popliteal angioplasty;  Surgeon: Michelle Galvan MD;  Location: [facility] MA[facility];  Service: Vascular    ROTATOR CUFF REPAIR Right     SPINAL CORD STIMULATOR IMPLANT      \"Medtronic interstim model # 3058- in lower back to control bowel movements-currently turned off-battery is dead\"    TOE AMPUTATION Right 10/28/2016    Procedure: 3RD TOE AMPUTATION ;  Surgeon: Anjel Salas DPM;  Location: [facility];  Service:       Family History   Problem Relation Age of Onset    Leukemia Mother     Liver disease Mother     Lung cancer Mother         heavy smoker - 3 ppd    Heart disease Father     Liver disease Father     Diabetes type I Father     Multiple myeloma Sister     Breast cancer Sister     Urolithiasis Family     Alcohol abuse Neg Hx     Depression Neg Hx     Drug abuse Neg Hx     Substance Abuse Neg Hx     Mental illness Neg Hx       Social History     Tobacco Use    Smoking status: Never    Smokeless tobacco: Never   Vaping Use    Vaping status: Never Used   Substance Use Topics    Alcohol use: Never    Drug use: No      E-Cigarette/Vaping    E-Cigarette Use Never User       E-Cigarette/Vaping Substances    Nicotine No     THC No     CBD No     Flavoring No     Other No     Unknown No       I have reviewed and agree with the history as documented.     65-year-old male presenting from the [facility], for a complaint of altered mental status.  " Patient is at acute rehab status post 3 stents placed while at [facility] in [place] a month ago.  Patient had been on psychiatric medication in the past but had to be stopped due to his heart conditions.  While at this postacute rehab, patient has had increasing aggressive behavior in which he is unable to complete rehab and the facility is unable to handle.  Today patient was found on the floor covered in feces throwing feces at staff and throwing a remote control at staff.  Staff was unable to handle patient and dialed 911.  EMS found patient in similar state, covered in feces and throwing things at EMS.  EMS was able to get patient onto [facility] and keep him calm during transport.  I did contact patient's wife who states that patient does have a history of aggressive behavior like this but due to his medication being stopped it has been significantly worsening to the point where he is unable to complete any rehab at the facility and things have gotten worse.        Review of Systems   Unable to perform ROS: Mental status change           Objective       ED Triage Vitals   Temperature Pulse Blood Pressure Respirations SpO2 Patient Position - Orthostatic VS   03/21/25 0431 03/21/25 0420 03/21/25 0420 03/21/25 0420 03/21/25 0424 03/21/25 0515   (!) 95.8 °F (35.4 °C) 70 96/52 18 100 % Lying      Temp Source Heart Rate Source BP Location FiO2 (%) Pain Score    03/21/25 0431 03/21/25 0420 03/21/25 0420 -- --    Rectal Monitor Right arm        Vitals      Date and Time Temp Pulse SpO2 Resp BP Pain Score FACES Pain Rating User   03/21/25 0822 97.6 °F (36.4 °C) 77 96 % 18 98/56 -- -- KD   03/21/25 0751 -- 79 95 % 18 119/57 -- --    03/21/25 0745 -- 74 96 % 20 78/47 -- -- KD   03/21/25 0730 -- 72 95 % 16 87/46 -- -- KD   03/21/25 0728 -- 73 97 % 18 90/46 -- -- KD   03/21/25 0700 -- 69 93 % 16 89/52 Provider aware -- -- KD   03/21/25 0645 -- 69 95 % 16 110/55 -- --    03/21/25 0632 97.5 °F (36.4 °C) -- -- -- -- -- --     03/21/25 0630 -- 69 93 % 16 101/55 -- --    03/21/25 0615 -- 70 94 % 16 109/62 -- --    03/21/25 0600 -- 67 92 % 16 102/57 -- --    03/21/25 0550 -- 65 92 % 16 103/56 -- --    03/21/25 0540 -- 68 97 % 16 104/59 -- --    03/21/25 0530 -- 69 95 % 18 100/55 -- --    03/21/25 0520 -- -- -- -- 111/53 -- --    03/21/25 0518 97.5 °F (36.4 °C) -- -- -- -- -- --    03/21/25 0517 -- -- -- -- 108/54 -- --    03/21/25 0515 95.2 °F (35.1 °C) 69 98 % 20 73/40 Provider aware -- --    03/21/25 0500 95.7 °F (35.4 °C) 72 98 % 20 -- -- --    03/21/25 0431 95.8 °F (35.4 °C) -- -- -- -- -- --    03/21/25 0424 -- -- 100 % -- -- -- --    03/21/25 0420 -- 70 -- 18 96/52 -- --             Physical Exam  Constitutional:       Appearance: He is ill-appearing.   HENT:      Head: Normocephalic and atraumatic.      Right Ear: External ear normal.      Left Ear: External ear normal.      Nose: Nose normal.      Mouth/Throat:      Pharynx: Oropharynx is clear.   Eyes:      Conjunctiva/sclera: Conjunctivae normal.      Pupils: Pupils are equal, round, and reactive to light.   Cardiovascular:      Rate and Rhythm: Normal rate.      Pulses: Normal pulses.      Heart sounds: Normal heart sounds.   Pulmonary:      Effort: Pulmonary effort is normal.      Breath sounds: Normal breath sounds.   Abdominal:      General: Bowel sounds are normal.      Palpations: Abdomen is soft.   Musculoskeletal:         General: Normal range of motion.      Cervical back: Normal range of motion.   Skin:     General: Skin is warm.      Capillary Refill: Capillary refill takes less than 2 seconds.      Comments: Along the interval arms.  Patient is unsure of where they came from.  Bruising at the pinky of the left hand   Neurological:      Mental Status: He is disoriented.   Psychiatric:         Behavior: Behavior is agitated and aggressive (Not swinging or attempting to throw things at staff but yelling and angry.).         Results  Reviewed       Procedure Component Value Units Date/Time    HS Troponin I 2hr [[id number]]  (Abnormal) Collected: 03/21/25 0726    Lab Status: Final result Specimen: Blood from Arm, Right Updated: 03/21/25 0804     hs TnI 2hr 216 ng/L      Delta 2hr hsTnI -30 ng/L     HS Troponin I 4hr [[id number]]     Lab Status: No result Specimen: Blood     B-Type Natriuretic Peptide(BNP) [[id number]]  (Abnormal) Collected: 03/21/25 0455    Lab Status: Final result Specimen: Blood from Arm, Right Updated: 03/21/25 0603     BNP 2,769 pg/mL     FLU/COVID Rapid Antigen (30 min. TAT) - Preferred screening test in ED [[id number]]  (Normal) Collected: 03/21/25 0520    Lab Status: Final result Specimen: Nares from Nose Updated: 03/21/25 0552     SARS COV Rapid Antigen Negative     Influenza A Rapid Antigen Negative     Influenza B Rapid Antigen Negative    Narrative:      This test has been performed using the Vengaidel Tamika 2 FLU+SARS Antigen test under the Emergency Use Authorization (EUA). This test has been validated by the  and verified by the performing laboratory. The Tamika uses lateral flow immunofluorescent sandwich assay to detect SARS-COV, Influenza A and Influenza B Antigen.     The Quidel Tamika 2 SARS Antigen test does not differentiate between SARS-CoV and SARS-CoV-2.     Negative results are presumptive and may be confirmed with a molecular assay, if necessary, for patient management. Negative results do not rule out SARS-CoV-2 or influenza infection and should not be used as the sole basis for treatment or patient management decisions. A negative test result may occur if the level of antigen in a sample is below the limit of detection of this test.     Positive results are indicative of the presence of viral antigens, but do not rule out bacterial infection or co-infection with other viruses.     All test results should be used as an adjunct to clinical observations and other information available to the  provider.    FOR PEDIATRIC PATIENTS - copy/paste COVID Guidelines URL to browser: [email or web address]    TSH, 3rd generation with Free T4 reflex [[id number]]  (Abnormal) Collected: 03/21/25 0455    Lab Status: Final result Specimen: Blood from Arm, Right Updated: 03/21/25 0544     TSH 3RD GENERATON 6.133 uIU/mL     T4, free [[id number]] Collected: 03/21/25 0455    Lab Status: In process Specimen: Blood from Arm, Right Updated: 03/21/25 0544    Procalcitonin [[id number]]  (Normal) Collected: 03/21/25 0455    Lab Status: Final result Specimen: Blood from Arm, Right Updated: 03/21/25 0537     Procalcitonin 0.24 ng/ml     HS Troponin 0hr (reflex protocol) [[id number]]  (Abnormal) Collected: 03/21/25 0455    Lab Status: Final result Specimen: Blood from Arm, Right Updated: 03/21/25 0535     hs TnI 0hr 246 ng/L     Comprehensive metabolic panel [[id number]]  (Abnormal) Collected: 03/21/25 0455    Lab Status: Final result Specimen: Blood from Arm, Right Updated: 03/21/25 0529     Sodium 135 mmol/L      Potassium 4.8 mmol/L      Chloride 95 mmol/L      CO2 27 mmol/L      ANION GAP 13 mmol/L      BUN 38 mg/dL      Creatinine 6.02 mg/dL      Glucose 111 mg/dL      Calcium 8.6 mg/dL      AST 15 U/L      ALT 9 U/L      Alkaline Phosphatase 117 U/L      Total Protein 6.9 g/dL      Albumin 4.0 g/dL      Total Bilirubin 0.64 mg/dL      eGFR 8 ml/min/1.73sq m     Narrative:      National Kidney Disease Foundation guidelines for Chronic Kidney Disease (CKD):     Stage 1 with normal or high GFR (GFR > 90 mL/min/1.73 square meters)    Stage 2 Mild CKD (GFR = 60-89 mL/min/1.73 square meters)    Stage 3A Moderate CKD (GFR = 45-59 mL/min/1.73 square meters)    Stage 3B Moderate CKD (GFR = 30-44 mL/min/1.73 square meters)    Stage 4 Severe CKD (GFR = 15-29 mL/min/1.73 square meters)    Stage 5 End Stage CKD (GFR <15 mL/min/1.73 square meters)  Note: GFR calculation is accurate only with a  steady state creatinine    Magnesium [[id number]]  (Normal) Collected: 03/21/25 0455    Lab Status: Final result Specimen: Blood from Arm, Right Updated: 03/21/25 0529     Magnesium 2.0 mg/dL     Lactic acid [[id number]]  (Normal) Collected: 03/21/25 0455    Lab Status: Final result Specimen: Blood from Arm, Right Updated: 03/21/25 0528     LACTIC ACID 2.0 mmol/L     Narrative:      Result may be elevated if tourniquet was used during collection.    Protime-INR [[id number]]  (Normal) Collected: 03/21/25 0455    Lab Status: Final result Specimen: Blood from Arm, Right Updated: 03/21/25 0522     Protime 14.4 seconds      INR 1.05    Narrative:      INR Therapeutic Range    Indication                                             INR Range      Atrial Fibrillation                                               2.0-3.0  Hypercoagulable State                                    2.0.2.3  Left Ventricular Asist Device                            2.0-3.0  Mechanical Heart Valve                                  -    Aortic(with afib, MI, embolism, HF, LA enlargement,    and/or coagulopathy)                                     2.0-3.0 (2.5-3.5)     Mitral                                                             2.5-3.5  Prosthetic/Bioprosthetic Heart Valve               2.0-3.0  Venous thromboembolism (VTE: VT, PE        2.0-3.0    APTT [[id number]]  (Normal) Collected: 03/21/25 0455    Lab Status: Final result Specimen: Blood from Arm, Right Updated: 03/21/25 0522     PTT 31 seconds     CBC and differential [[id number]]  (Abnormal) Collected: 03/21/25 0455    Lab Status: Final result Specimen: Blood from Arm, Right Updated: 03/21/25 0511     WBC 4.48 Thousand/uL      RBC 3.21 Million/uL      Hemoglobin 10.1 g/dL      Hematocrit 32.2 %       fL      MCH 31.5 pg      MCHC 31.4 g/dL      RDW 17.8 %      MPV 11.0 fL      Platelets 117 Thousands/uL      nRBC 0 /100 WBCs      Segmented % 68 %      Immature Grans % 1 %       Lymphocytes % 18 %      Monocytes % 9 %      Eosinophils Relative 3 %      Basophils Relative 1 %      Absolute Neutrophils 3.10 Thousands/µL      Absolute Immature Grans 0.03 Thousand/uL      Absolute Lymphocytes 0.80 Thousands/µL      Absolute Monocytes 0.41 Thousand/µL      Eosinophils Absolute 0.11 Thousand/µL      Basophils Absolute 0.03 Thousands/µL     Blood culture #2 [[id number]] Collected: 03/21/25 0455    Lab Status: In process Specimen: Blood from Arm, Right Updated: 03/21/25 0508    Blood culture #1 [[id number]] Collected: 03/21/25 0455    Lab Status: In process Specimen: Blood from Arm, Right Updated: 03/21/25 0508    Fingerstick Glucose (POCT) [[id number]]  (Normal) Collected: 03/21/25 0441    Lab Status: Final result Specimen: Blood Updated: 03/21/25 0442     POC Glucose 116 mg/dl             CT head wo contrast   Final Interpretation by Francis Cardenas MD (03/21 0830)      Compared to prior exam there is a new hypodensity in the right hemipons with sparing of the periphery. Although this may be artifactual, if there is clinical concern for an acute infarct, further evaluation with a brain MRI is recommended.               Workstation performed: [id number]         CT chest abdomen pelvis wo contrast   Final Interpretation by Francis Cardenas MD (03/21 0857)      1.  No acute abnormality in the chest, abdomen, or pelvis   2.  Multiple prominent mediastinal lymph nodes, which are nonspecific and may be reactive. Consider follow-up chest CT after resolution of patient's current symptoms.   3.  Likely hepatic cirrhosis with findings of portal hypertension, including splenomegaly and ascites.            Workstation performed: [id number]         XR chest portable   ED Interpretation by Rayo Cook MD (03/21 0527)   Worsening vascular congestion from previous          ECG 12 Lead Documentation Only    Date/Time: 3/21/2025 5:41 AM    Performed by: Rayo Cook MD  Authorized by: Rayo Cook MD     Indications / Diagnosis:  Altered mental status  ECG reviewed by me, the ED Provider: yes    Patient location:  ED  Previous ECG:     Previous ECG:  Compared to current    Similarity:  Changes noted  Interpretation:     Interpretation: abnormal    Rate:     ECG rate:  73    ECG rate assessment: normal    Rhythm:     Rhythm: sinus rhythm    Ectopy:     Ectopy: none    QRS:     QRS axis:  Left    QRS intervals:  Wide  Conduction:     Conduction: abnormal      Abnormal conduction: complete RBBB    ST segments:     ST segments:  Non-specific    Depression:  V3, V4, aVF, V6 and V5 (Noted on previous EKG, patient has past stents placed.)  T waves:     T waves: non-specific    Comments:      EKG appears similar to previous 1 completed on March 17      ED Medication and Procedure Management   Prior to Admission Medications   Prescriptions Last Dose Informant Patient Reported? Taking?   Apixaban Starter Pack 5 MG TBPK   Yes No   Sig: Take-Home Kit dispensed by provider to patient in hospital setting. Use as directed.   MIDODRINE HCL PO  Child Yes No   Sig: Take 5 mg by mouth PRN w/ dialysis   Sevelamer Carbonate 2.4 g PACK  Child Yes No   Sig: VIGOROUSLY MIX CONTENTS OF 1 PACKET IN WATER (IT WILL NOT DISSOLVE) AND DRINK 3 TIMES DAILY WITH MEALS   Vitamin D3 1.25 MG (68068 UT) capsule  Child No No   Sig: TAKE 1 CAPSULE BY MOUTH ONE TIME PER WEEK   aspirin 81 mg chewable tablet  Child Yes No   Sig: Chew 81 mg daily   atorvastatin (LIPITOR) 40 mg tablet   No No   Sig: Take 1 tablet (40 mg total) by mouth daily with dinner   cinacalcet (SENSIPAR) 60 MG tablet  Child No No   Sig: Take 2 tablets (120 mg total) by mouth 3 (three) times a week   divalproex sodium (DEPAKOTE SPRINKLE) 125 MG capsule  Child No No   Sig: Take 2 capsules (250 mg total) by mouth every 12 (twelve) hours   Patient not taking: Reported on 3/17/2025   metoprolol succinate (TOPROL-XL) 25 mg 24 hr tablet   No No   Sig: TAKE 1 TABLET BY MOUTH TWICE A DAY    prasugrel (EFFIENT) tablet   No No   Sig: Take 1 tablet (10 mg total) by mouth daily   sacubitril-valsartan (Entresto) 24-26 MG TABS  Child No No   Sig: Take 1 tablet by mouth in the morning      Facility-Administered Medications: None     Patient's Medications   Discharge Prescriptions    No medications on file     No discharge procedures on file.  ED SEPSIS DOCUMENTATION   Time reflects when diagnosis was documented in both MDM as applicable and the Disposition within this note       Time User Action Codes Description Comment    3/21/2025  5:55 AM Rayo Cook [R46.89] Aggressive behavior     3/21/2025  5:55 AM Rayo Cook [I95.9] Hypotension     3/21/2025  5:55 AM Rayo Cook [T68.XXXA] Hypothermia, initial encounter     3/21/2025  5:57 AM Rayo Cook [R79.89] Elevated troponin     3/21/2025  7:44 AM Rayo Cook [N18.6,  Z99.2] ESRD on dialysis (HCC)     3/21/2025  8:13 AM Awais Machado Modify [R46.89] Aggressive behavior     3/21/2025  8:13 AM Awais Machado Modify [I95.9] Hypotension     3/21/2025  8:15 AM Awais Machado Add [I47.29] Nonsustained ventricular tachycardia (HCC)            Initial Sepsis Screening       Row Name 03/21/25 0563                Is the patient's history suggestive of a new or worsening infection? Yes (Proceed)  -CD        Suspected source of infection suspect infection, source unknown  -CD        Indicate SIRS criteria Hyperthemia > 38.3C (100.9F) OR Hypothermia <36C (96.8F)  -CD        Are two or more of the above signs & symptoms of infection both present and new to the patient? No  -CD                  User Key  (r) = Recorded By, (t) = Taken By, (c) = Cosigned By      Initials Name Provider Type    CD Rayo Cook MD Resident                       Rayo Cook MD  03/21/25 0900

## 2025-03-21 NOTE — ASSESSMENT & PLAN NOTE
Initially BP soft, s/p  ml bolus x 2.  Now normotensive  +hx intradialytic hypotension  Volume status mild hypervolemia  Home Rx: Entresto 24-26 mg daily, Toprol-XL 50 mg daily, midodrine 10 mg 3 times a week with HD  Continue midodrine with HD  Avoid hypotension or fluctuations in blood pressure.  Maintain MAP >65  Continue to trend

## 2025-03-21 NOTE — ASSESSMENT & PLAN NOTE
Wt Readings from Last 3 Encounters:   03/19/25 98.4 kg (217 lb)   02/19/25 96.2 kg (212 lb)   01/28/25 94.8 kg (209 lb)   Compensated  echo 3/1/2025:  EF 35-40% with G2 DD, moderate to severe AS, mild to moderate MR, mild to moderate TR  on beta blockers and Entresto  fluid restriction, 2 gm low sodium diet, daily weights  management per primary team

## 2025-03-21 NOTE — ASSESSMENT & PLAN NOTE
Wt Readings from Last 3 Encounters:   03/19/25 98.4 kg (217 lb)   02/19/25 96.2 kg (212 lb)   01/28/25 94.8 kg (209 lb)     Echo 3/1/25 EF 35-40%, moderate systolic dysfunction, G3DD, global hypokinesis, severely dilated LA, moderate to severe AS and TR.  Patient recently underwent PCI on 3/6/25 at [facility] with restenting of LAD, discharged on 3/14 with plans to follow-up for assessment for potential TAVR  His hospitalization was complicated by hypotension that required ICU level of care and pressor support    Home med: Metoprolol succinate 25 mg qHS, Entresto 24-26 mg qHS, ASA 81 mg, Prasugrel 10 mg QD, Atorvastatin 40 mg QD    Plan:  Continue home regimen  Hold presugrel in the setting of possible stroke  Possible cardiolgy consult
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
## 2025-03-21 NOTE — SEPSIS NOTE
Sepsis Note   Asad Galloway 65 y.o. male MRN: [id number]  Unit/Bed#: ED-31 Encounter: [id number]       Initial Sepsis Screening       Row Name 03/21/25 0517                Is the patient's history suggestive of a new or worsening infection? Yes (Proceed)  -CD        Suspected source of infection suspect infection, source unknown  -CD        Indicate SIRS criteria Hyperthemia > 38.3C (100.9F) OR Hypothermia <36C (96.8F)  -CD        Are two or more of the above signs & symptoms of infection both present and new to the patient? No  -CD                  User Key  (r) = Recorded By, (t) = Taken By, (c) = Cosigned By      Initials Name Provider Type    CD Rayo Cook MD Resident                        There is no height or weight on file to calculate BMI.  Wt Readings from Last 1 Encounters:   03/19/25 98.4 kg (217 lb)        Ideal body weight: 70.7 kg (155 lb 13.8 oz)  Adjusted ideal body weight: 81.8 kg (180 lb 5.1 oz)
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
## 2025-03-21 NOTE — PROGRESS NOTES
Asad Galloway is a 65 y.o. male who is currently ordered Vancomycin IV with management by the Pharmacy Consult service.  Relevant clinical data and objective / subjective history reviewed.  Vancomycin Assessment:  Indication and Goal AUC/Trough: Bacteremia (goal -600, trough >10)  Clinical Status:  new  Micro:     Renal Function:  SCr: 6.02 mg/dL  CrCl: 14 mL/min  Renal replacement: HD M/W/F  Days of Therapy: 1  Current Dose: 2000 mg IV loading dose given  Vancomycin Plan:  New Dosing: 10 mg/kg (750 mg) IV after each HD session  Next Level: pre-HD on 3/26 @ 0600   Renal Function Monitoring: Daily BMP and UOP  Pharmacy will continue to follow closely for s/sx of nephrotoxicity, infusion reactions and appropriateness of therapy.  BMP and CBC will be ordered per protocol. We will continue to follow the patient’s culture results and clinical progress daily.    Amanda Salcedo, Pharmacist

## 2025-03-21 NOTE — PLAN OF CARE
Post-Dialysis RN Treatment Note    Blood Pressure:  Pre 103/59 mm/Hg  Post 110/52 mmHg   EDW 94.5 kg    Weight:  Pre unable to weigh/ low BP/ in chair   Post Not Applicable kg   Mode of weight measurement: N/A   Volume Removed:  3000 ml    Treatment duration: 240 minutes    NS given:  No    Treatment shortened No   Medications given during Rx: Midodrine   Estimated Kt/V:  Not Applicable   Access type: AV fistula   Needle Gauge: 15G   Access Issues: No    Report called to primary nurse:   Yes / Val Smith RN        2500 ml as tolerated, 2K bath, 4 hrs     Problem: METABOLIC, FLUID AND ELECTROLYTES - ADULT  Goal: Electrolytes maintained within normal limits  Description: INTERVENTIONS:- Monitor labs and assess patient for signs and symptoms of electrolyte imbalances- Administer electrolyte replacement as ordered- Monitor response to electrolyte replacements, including repeat lab results as appropriate- Instruct patient on fluid and nutrition as appropriate  Outcome: Progressing  Goal: Fluid balance maintained  Description: INTERVENTIONS:- Monitor labs - Monitor I/O and WT- Instruct patient on fluid and nutrition as appropriate- Assess for signs & symptoms of volume excess or deficit  Outcome: Progressing

## 2025-03-21 NOTE — ASSESSMENT & PLAN NOTE
Lab Results   Component Value Date    HGBA1C 5.5 11/07/2024       Recent Labs     03/21/25  0441   POCGLU 116       Blood Sugar Average: Last 72 hrs:  (P) 116

## 2025-03-21 NOTE — ED NOTES
"Patient heard screaming in room from [facility]. This RN and another RN approached patients room. RN asked why patient was screaming, patient continues to scream stating \"I'm in pain, I'm in fu**ing pain\" continuously. RN informed patient that he does not need to scream, and also asked patient not to curse. Patient became more angry and screaming some more. Patients wife at bedside trying to diffuse patient. An MD passing by in [facility] also responded to patients room to see if help was needed. At this time patient stopped screaming. RN informed patient that she would contact his RN and inform of pain so pain med can be administered. Other RN told this RN that patient was overheard telling wife \"I wish you would die\" earlier today.      Abimbola Pineda RN  03/21/25 6720    "

## 2025-03-21 NOTE — H&P
H&P - Hospitalist   Name: Asad Galloway 65 y.o. male I MRN: [id number]  Unit/Bed#: ED-31 I Date of Admission: 3/21/2025   Date of Service: 3/21/2025 I Hospital Day: 0     Assessment & Plan  AMS (altered mental status)  Patient presents from [facility] after he was found covered in feces this morning and throwing things at staff  On arrival to ED patient mets SIRS criteria for hypothermia and hypotension  Initially received  mL bolus and was briefly placed on vaso gtt, critical care evaluated patient and he was deemed appropriate for stepdown  Blood cultures also drawn and patient started on cefepime and vancomycin  Trop 246->216->235, WBC 4.48, Lactic 2.0, Procal 0.24, TSH 6.133, Viral Resp panel negative  CTH: New interval hypodensity in R julien, possibly artifactual  On exam patient oriented to person and place, intermittently agitated particularly when speaking about [facility], otherwise no focal neurologic deficits    Plan:  MRI Brain  Consult neurology  Delirium precautions  Continue cefepime  Continue Vancomycin  SIRS (systemic inflammatory response syndrome) ([facility])  Patient met SIRS criteria on admission for hypothermia and hypotension, was breifly placed on vaso gtt and given 500 mL bolus with resolution of hypotension  Patient also received cefepime and vancomycin   Patient's son notes that he frequently has problems with hypotension, and that is baseline BP is low  Patient did note 4 episodes of diarrhea overnight.  HFrEF (heart failure with reduced ejection fraction) ([facility])  Wt Readings from Last 3 Encounters:   03/19/25 98.4 kg (217 lb)   02/19/25 96.2 kg (212 lb)   01/28/25 94.8 kg (209 lb)     Echo 3/1/25 EF 35-40%, moderate systolic dysfunction, G3DD, global hypokinesis, severely dilated LA, moderate to severe AS and TR.  Patient recently underwent PCI on 3/6/25 at [place] with restenting of LAD, discharged on 3/14 with plans to follow-up for assessment for potential TAVR  His hospitalization was  complicated by hypotension that required ICU level of care and pressor support    Home med: Metoprolol succinate 25 mg qHS, Entresto 24-26 mg qHS, ASA 81 mg, Prasugrel 10 mg QD, Atorvastatin 40 mg QD    Plan:  Continue home regimen  Hold presugrel in the setting of possible stroke  Possible cardiolgy consult  ESRD on dialysis ([facility])  Lab Results   Component Value Date    EGFR 8 03/21/2025    EGFR 7 03/17/2025    EGFR 13 (L) 01/11/2025    CREATININE 6.02 (H) 03/21/2025    CREATININE 7.33 (H) 03/17/2025    CREATININE 4.64 (H) 01/11/2025   Patient on MWF HD  Patient was previously on sevelamer and cinacalcet but these were discontinued during recent admission in [place]   Patient does take midodrine 5 mg prior to dialysis    Plan:  Consult nephrology to set up HD  Midodrine 5 mg prior to HD  Primary hypertension  Home med: Metoprolol succinate 25 mg qHS, Entresto 24-26 mg qHS  Mood disorder (HCC)  Per patient's son, was previously on VPA but this medication was discontinued during his recent admission in [place] d/t concerns for thrombocytopenia  Type 2 diabetes mellitus ([facility])  Lab Results   Component Value Date    HGBA1C 5.5 11/07/2024       Recent Labs     03/21/25  0441   POCGLU 116       Blood Sugar Average: Last 72 hrs:  (P) 116    No home medications at this time.  HgbA1c 5.5    Plan:  CHO control diet    Anemia due to chronic kidney disease, on chronic dialysis ([facility])  Lab Results   Component Value Date    EGFR 8 03/21/2025    EGFR 7 03/17/2025    EGFR 13 (L) 01/11/2025    CREATININE 6.02 (H) 03/21/2025    CREATININE 7.33 (H) 03/17/2025    CREATININE 4.64 (H) 01/11/2025     Hgb 10.1 on arrival to ED, consistent with baseline of 9-10    Plan:  CBC daily      VTE Pharmacologic Prophylaxis: VTE Score: 6 High Risk (Score >/= 5) - Pharmacological DVT Prophylaxis Ordered: heparin. Sequential Compression Devices Ordered.  Code Status: Level 1 - Full Code   Discussion with family: Updated  (wife and son) at  "bedside.    Anticipated Length of Stay: Patient will be admitted on an inpatient basis with an anticipated length of stay of greater than 2 midnights secondary to sepsis work-up, hemodialysis.    History of Present Illness   Chief Complaint: Agitation, hypothermia    Asad Galloway is a 65 y.o. male with a PMH of ESRD on MWF HD, CAD s/p PCI, CVA in 2018, T2DM, HLD, HTN, GERD who presents from [facility] with agitation. Per EMS, patient was found covered in feces this morning, agitated and throwing things at staff, so patient was brought into ED. On arrival to ED patient hypothermic at 95.8 and hypotensive at 73/40. Patient initially received 2x 250 ml NS boluses with improvement of hypotension, also received aspirin load, in addition to cefepime and vancomycin. Patient was briefly started on NE and was evaluated by critical care, was deemed appropriate for stepdown.  Of note patient was recently hospitalized from 2/28/2025 through 3/16/2025 at [facility], initially went for a valve assessment given persistent hypotension during HD sessions.  Left and right heart catheterization done at [place] and balloon angioplasty was performed on previously stented LAD.  His stay was complicated by ICU level of care pressor requirement.  Discharged to acute rehab.  On my assessment this morning patient denying any chest pain, shortness of breath.  He does state that he feels overall \"bad\" but could not further clarify.    Review of Systems   Constitutional:  Positive for fatigue. Negative for chills.   Respiratory:  Negative for chest tightness.    Cardiovascular:  Positive for leg swelling. Negative for chest pain and palpitations.   Gastrointestinal:  Positive for diarrhea. Negative for nausea and vomiting.   Neurological:  Negative for tremors, seizures, syncope, facial asymmetry, speech difficulty, weakness, light-headedness, numbness and headaches.   Psychiatric/Behavioral:  Positive for agitation, behavioral problems " and confusion.    All other systems reviewed and are negative.      Historical Information   Past Medical History:   Diagnosis Date    Cerebrovascular accident (CVA) due to thrombosis of left middle cerebral artery ([facility]) 07/29/2018    Chronic kidney disease     Coronary artery disease     Diabetes mellitus (HCC)     not on meds    Dialysis patient ([facility])     M-W-F    Fistula     left upper arm for hemodialyis    GERD (gastroesophageal reflux disease)     History of coronary artery stent placement     x3    Hypercholesteremia     Hyperlipidemia     Hypertension     Infectious viral hepatitis     B as child    Limb alert care status     no BP/IV left arm    Neuropathy     Nonhealing ulcer of heel ([facility]) 04/25/2023    Obesity     Osteomyelitis ([facility])     last assessed 11/4/16-per son not currently    Penetrating foot wound, left, initial encounter 01/19/2022    PVC's (premature ventricular contractions)     sees  Cardio    Stroke ([facility])     last weeof July 2018 [facility]    TIA (transient ischemic attack) 10/28/2018    Ulcer of left heel, limited to breakdown of skin ([facility]) 06/13/2024    Wears dentures     Wears glasses      Past Surgical History:   Procedure Laterality Date    ABDOMINAL SURGERY      CARDIAC CATHETERIZATION N/A 05/02/2022    Procedure: Cardiac Coronary Angiogram;  Surgeon: Sam Davis MD;  Location: [facility];  Service: Cardiology    CARDIAC CATHETERIZATION N/A 05/02/2022    Procedure: Cardiac pci;  Surgeon: Sam Davis MD;  [facility]: [facility];  Service: Cardiology    CARDIAC CATHETERIZATION  02/01/2023    Procedure: Cardiac catheterization;  Surgeon: Sam Davis MD;  Location: [facility];  Service: Cardiology    CARDIAC CATHETERIZATION N/A 02/01/2023    Procedure: Cardiac pci;  Surgeon: Sam Davis MD;  [facility]: [facility];  Service: Cardiology    CARDIAC CATHETERIZATION N/A 02/01/2023    Procedure: Cardiac Coronary Angiogram;  Surgeon: Sam  "MD Ryan;  Location: [facility];  Service: Cardiology    CARDIAC CATHETERIZATION N/A 02/01/2023    Procedure: Cardiac other-IVUS;  Surgeon: Sam Davis MD;  Location: [facility];  Service: Cardiology    CHOLECYSTECTOMY      Percutaneous    COLONOSCOPY      CORONARY ANGIOPLASTY WITH STENT PLACEMENT      CYSTOSCOPY      HEMODIALYSIS ADULT  1/22/2024    HEMODIALYSIS ADULT  01/24/2024    IR LOWER EXTREMITY ANGIOGRAM  9/29/2023    IR LOWER EXTREMITY ANGIOGRAM  02/26/2024    OTHER SURGICAL HISTORY      \"stimulator to control bowel movements\"    AZ ESOPHAGOGASTRODUODENOSCOPY TRANSORAL DIAGNOSTIC N/A 09/27/2016    Procedure: ESOPHAGOGASTRODUODENOSCOPY (EGD);  Surgeon: Adele Rowe MD;  Location: [facility] GI LAB;  Service: Gastroenterology    AZ LAPAROSCOPY SURG CHOLECYSTECTOMY N/A 02/29/2016    Procedure: LAPAROSCOPIC CHOLECYSTECTOMY ;  Surgeon: Cliff Roman DO;  Location: [facility];  Service: General    AZ SLCTV CATHJ 3RD+ ORD SLCTV ABDL PEL/LXTR BRNCH Left 9/29/2023    Procedure: Left leg angiogram with intervention;  Surgeon: Michelle Galvan MD;  Location: [facility] MA[facility];  Service: Vascular    AZ SLCTV CATHJ 3RD+ ORD SLCTV ABDL PEL/LXTR BRNCH Left 02/26/2024    Procedure: Left leg angiogram antegrade access, left popliteal angioplasty;  Surgeon: Michelle Galvan MD;  Location: [facility];  Service: Vascular    ROTATOR CUFF REPAIR Right     SPINAL CORD STIMULATOR IMPLANT      \"Medtronic interstim model # 3058- in lower back to control bowel movements-currently turned off-battery is dead\"    TOE AMPUTATION Right 10/28/2016    Procedure: 3RD TOE AMPUTATION ;  Surgeon: Anjel Salas DPM;  Location: [facility];  Service:      Social History     Tobacco Use    Smoking status: Never    Smokeless tobacco: Never   Vaping Use    Vaping status: Never Used   Substance and Sexual Activity    Alcohol use: Never    Drug use: No    Sexual activity: Not Currently     Partners: Female     Comment: defer "     E-Cigarette/Vaping    E-Cigarette Use Never User      E-Cigarette/Vaping Substances    Nicotine No     THC No     CBD No     Flavoring No     Other No     Unknown No      Family History   Problem Relation Age of Onset    Leukemia Mother     Liver disease Mother     Lung cancer Mother         heavy smoker - 3 ppd    Heart disease Father     Liver disease Father     Diabetes type I Father     Multiple myeloma Sister     Breast cancer Sister     Urolithiasis Family     Alcohol abuse Neg Hx     Depression Neg Hx     Drug abuse Neg Hx     Substance Abuse Neg Hx     Mental illness Neg Hx      Social History:  Marital Status: /Civil Union   Occupation: Retired  Patient Pre-hospital Living Situation: Skilled Nursing Facility: [facility]    Meds/Allergies   I have reviewed home medications with patient family member.  Prior to Admission medications    Medication Sig Start Date End Date Taking? Authorizing Provider   aspirin 81 mg chewable tablet Chew 81 mg daily   Yes Historical Provider, MD   atorvastatin (LIPITOR) 40 mg tablet Take 1 tablet (40 mg total) by mouth daily with dinner 3/20/25  Yes Britney Tan MD   metoprolol succinate (TOPROL-XL) 25 mg 24 hr tablet TAKE 1 TABLET BY MOUTH TWICE A DAY 3/10/25  Yes Britney Tan MD   MIDODRINE HCL PO Take 5 mg by mouth PRN w/ dialysis 8/9/24 8/8/25 Yes Historical Provider, MD   prasugrel (EFFIENT) tablet Take 1 tablet (10 mg total) by mouth daily 3/20/25  Yes Britney Tan MD   sacubitril-valsartan (Entresto) 24-26 MG TABS Take 1 tablet by mouth in the morning 10/17/24  Yes Britney Tan MD   Vitamin D3 1.25 MG (70983 UT) capsule TAKE 1 CAPSULE BY MOUTH ONE TIME PER WEEK 4/17/24  Yes Mary Torres MD   Apixaban Starter Pack 5 MG TBPK Take-Home Kit dispensed by provider to patient in hospital setting. Use as directed. 1/11/25 1/11/26  Historical Provider, MD   cinacalcet (SENSIPAR) 60 MG tablet Take 2 tablets (120 mg total) by mouth 3  (three) times a week 7/17/24   Christine Yulietrahat Sadaf Silva MD   divalproex sodium (DEPAKOTE SPRINKLE) 125 MG capsule Take 2 capsules (250 mg total) by mouth every 12 (twelve) hours  Patient not taking: Reported on 3/17/2025 7/23/24   Raj Auguste DO   Sevelamer Carbonate 2.4 g PACK VIGOROUSLY MIX CONTENTS OF 1 PACKET IN WATER (IT WILL NOT DISSOLVE) AND DRINK 3 TIMES DAILY WITH MEALS 4/21/23   Historical Provider, MD     No Known Allergies    Objective :  Temp:  [95.2 °F (35.1 °C)-97.6 °F (36.4 °C)] 97.6 °F (36.4 °C)  HR:  [65-83] 83  BP: ()/(40-63) 143/60  Resp:  [15-22] 15  SpO2:  [92 %-100 %] 99 %  O2 Device: None (Room air)    Physical Exam  Vitals and nursing note reviewed.   Constitutional:       Appearance: He is well-developed.   HENT:      Head: Normocephalic and atraumatic.   Eyes:      Conjunctiva/sclera: Conjunctivae normal.   Cardiovascular:      Rate and Rhythm: Normal rate and regular rhythm.      Heart sounds: Murmur heard.   Pulmonary:      Effort: Pulmonary effort is normal.      Breath sounds: Normal breath sounds.   Abdominal:      General: There is distension.      Palpations: Abdomen is soft.      Tenderness: There is no abdominal tenderness. There is no guarding.   Musculoskeletal:         General: No swelling.      Cervical back: Neck supple.   Skin:     General: Skin is warm and dry.      Capillary Refill: Capillary refill takes less than 2 seconds.      Comments: L posterior cervical ecchymosis  Scattered abrasions  L heel ulcer with overlying scab and no surrounding erythema or fluctuance   Neurological:      General: No focal deficit present.      Mental Status: He is alert.      Comments: Oriented to person and place   Psychiatric:      Comments: Intermittent outbursts          Lines/Drains:            Lab Results: I have reviewed the following results:  Results from last 7 days   Lab Units 03/21/25  0455   WBC Thousand/uL 4.48   HEMOGLOBIN g/dL 10.1*   HEMATOCRIT % 32.2*   PLATELETS  Thousands/uL 117*   SEGS PCT % 68   LYMPHO PCT % 18   MONO PCT % 9   EOS PCT % 3     Results from last 7 days   Lab Units 03/21/25  0455   SODIUM mmol/L 135   POTASSIUM mmol/L 4.8   CHLORIDE mmol/L 95*   CO2 mmol/L 27   BUN mg/dL 38*   CREATININE mg/dL 6.02*   ANION GAP mmol/L 13   CALCIUM mg/dL 8.6   ALBUMIN g/dL 4.0   TOTAL BILIRUBIN mg/dL 0.64   ALK PHOS U/L 117*   ALT U/L 9   AST U/L 15   GLUCOSE RANDOM mg/dL 111     Results from last 7 days   Lab Units 03/21/25  0455   INR  1.05     Results from last 7 days   Lab Units 03/21/25  0441   POC GLUCOSE mg/dl 116     Lab Results   Component Value Date    HGBA1C 5.5 11/07/2024    HGBA1C 5.9 (H) 07/14/2024    HGBA1C 5.1 01/10/2024     Results from last 7 days   Lab Units 03/21/25  0455   LACTIC ACID mmol/L 2.0   PROCALCITONIN ng/ml 0.24       XR chest portable  Result Date: 3/21/2025  Impression: Mild pulmonary vascular congestion. Workstation performed: [id number]     CT chest abdomen pelvis wo contrast  Result Date: 3/21/2025  Impression: 1.  No acute abnormality in the chest, abdomen, or pelvis 2.  Multiple prominent mediastinal lymph nodes, which are nonspecific and may be reactive. Consider follow-up chest CT after resolution of patient's current symptoms. 3.  Likely hepatic cirrhosis with findings of portal hypertension, including splenomegaly and ascites. Workstation performed: [id number]     CT head wo contrast  Result Date: 3/21/2025  Impression: Compared to prior exam there is a new hypodensity in the right hemipons with sparing of the periphery. Although this may be artifactual, if there is clinical concern for an acute infarct, further evaluation with a brain MRI is recommended. Workstation performed: [id number]       XR chest portable  Result Date: 3/21/2025  Impression Mild pulmonary vascular congestion. Workstation performed: [id number]        Other Study Results Review: EKG was reviewed.     Administrative Statements       ** Please Note: This note has  been constructed using a voice recognition system. **
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
## 2025-03-21 NOTE — ASSESSMENT & PLAN NOTE
Wt Readings from Last 3 Encounters:   03/19/25 98.4 kg (217 lb)   02/19/25 96.2 kg (212 lb)   01/28/25 94.8 kg (209 lb)

## 2025-03-21 NOTE — ASSESSMENT & PLAN NOTE
Lab Results   Component Value Date    HGBA1C 5.5 11/07/2024       Recent Labs     03/21/25  0441   POCGLU 116       Blood Sugar Average: Last 72 hrs:  (P) 116    No home medications at this time.  HgbA1c 5.5    Plan:  CHO control diet

## 2025-03-21 NOTE — TELEPHONE ENCOUNTER
In [facility]--spoke to pt wife. She will bring in the controller. Turn stim off or put in MRI mode if possible.  If controller can not communicate with stim than stim battery is dead. Proceed with MRI using T/R head coil. Normal mode.  I completed a risk vs benefit with Dr. Duckworth today.

## 2025-03-21 NOTE — CONSULTS
Consultation - Nephrology   Name: Asad Galloway 65 y.o. male I MRN: [id number]  Unit/Bed#: ED-31 I Date of Admission: 3/21/2025   Date of Service: 3/21/2025 I Hospital Day: 0   Inpatient consult to Nephrology  Consult performed by: Lexi Page PA-C  Consult ordered by: Awais Machado MD        Physician Requesting Evaluation: Modesto Tovar MD   Reason for Evaluation / Principal Problem: ESRD onHD    Assessment & Plan  ESRD on dialysis (HCC)  on maintenance HD MWF @[facility]   Access: LUE AVF  EDW: 94.5 kg  last treatment 3/19/2025  Recently required brief CRRT during hospitalization at [place] at time of cardiac cath w/stenting  electrolytes and acid/base stable  Plan for HD today  next treatment 3/24/2025.  Will plan to continue regular Monday, Wednesday, Friday schedule during hospitalization.    Primary hypertension  Initially BP soft, s/p  ml bolus x 2.  Now normotensive  +hx intradialytic hypotension  Volume status mild hypervolemia  Home Rx: Entresto 24-26 mg daily, Toprol-XL 50 mg daily, midodrine 10 mg 3 times a week with HD  Continue midodrine with HD  Avoid hypotension or fluctuations in blood pressure.  Maintain MAP >65  Continue to trend    Anemia due to chronic kidney disease, on chronic dialysis (HCC)  Hgb 10.1, at goal.  Goal Hgb >10.0  on Mircera 60 mcg every 2 weeks and Venofer 50 mg IV weekly as outpatient  trend CBC  transfuse for Hgb <7.0  management per primary team    Chronic kidney disease-mineral and bone disorder  Calcium and magnesium stable  Hx hyperphosphatemia.  Binders recently discontinued during hospitalization at [place] and continuing to monitor as outpatient.  Continue low phosphorus diet  Secondary hyperparathyroidism of renal origin: Sensipar recently discontinued.  Maintained on Hectorol 2 mcg 3 times a week with HD  Renal diet  Continue to monitor and manage as outpatient    AMS (altered mental status)  Depakote recently discontinued secondary to  associated mild thrombocytopenia  Management per primary team  CT with new hypodensity in the hemipons of unclear etiology    HFrEF (heart failure with reduced ejection fraction) ([facility])  Wt Readings from Last 3 Encounters:   03/19/25 98.4 kg (217 lb)   02/19/25 96.2 kg (212 lb)   01/28/25 94.8 kg (209 lb)   Compensated  echo 3/1/2025:  EF 35-40% with G2 DD, moderate to severe AS, mild to moderate MR, mild to moderate TR  on beta blockers and Entresto  fluid restriction, 2 gm low sodium diet, daily weights  management per primary team    Type 2 diabetes mellitus ([facility])  Lab Results   Component Value Date    HGBA1C 5.5 11/07/2024   stable  continue to optimize glycemic control  management per primary team    SIRS (systemic inflammatory response syndrome) ([facility])  Workup and management per primary team  Aortic stenosis  Scheduled for TAVR at [place] mid April  S/p recent cardiac cath with PCI    HISTORY OF PRESENT ILLNESS:  Requesting Physician: Modesto Tovar MD  Reason for Consult: ESRD on HD    Asad Galloway is a 65 y.o. year old male with ESRD on HD MWF via LUE AVF at [facility], CAD, s/p PCI/stent with recent stenting for restenosis, HTN, HLD, HFrEF, AS, DM2, hx L MCA CVA, PAD, s/p popliteal PTA 2/'24 who was admitted to [facility] after presenting with agitation and aggressive behavior at nursing facility.  Last HD on 3/19/2025.  Patient recently hospitalized at [place] for CTA and cardiac catheterization for TAVR workup.  Patient underwent stenting at that time and son reports he was on CRRT for short period of time. A renal consultation is requested today for assistance in the management of ESRD. Asad Galloway is a known ESRD patient who undergoes maintenance hemodialysis at [facility] on Monday, Wednesday, Friday.    PAST MEDICAL HISTORY:  Past Medical History:   Diagnosis Date    Cerebrovascular accident (CVA) due to thrombosis of left middle cerebral artery (HCC) 07/29/2018    Chronic kidney disease      Coronary artery disease     Diabetes mellitus (HCC)     not on meds    Dialysis patient ([facility])     M-W-F    Fistula     left upper arm for hemodialyis    GERD (gastroesophageal reflux disease)     History of coronary artery stent placement     x3    Hypercholesteremia     Hyperlipidemia     Hypertension     Infectious viral hepatitis     B as child    Limb alert care status     no BP/IV left arm    Neuropathy     Nonhealing ulcer of heel ([facility]) 04/25/2023    Obesity     Osteomyelitis ([facility])     last assessed 11/4/16-per son not currently    Penetrating foot wound, left, initial encounter 01/19/2022    PVC's (premature ventricular contractions)     sees [facility]    Stroke ([facility])     last weeof July 2018 [facility]    TIA (transient ischemic attack) 10/28/2018    Ulcer of left heel, limited to breakdown of skin ([facility]) 06/13/2024    Wears dentures     Wears glasses        PAST SURGICAL HISTORY:  Past Surgical History:   Procedure Laterality Date    ABDOMINAL SURGERY      CARDIAC CATHETERIZATION N/A 05/02/2022    Procedure: Cardiac Coronary Angiogram;  Surgeon: Sam Davis MD;  Location: [facility] CARD[facility];  Service: Cardiology    CARDIAC CATHETERIZATION N/A 05/02/2022    Procedure: Cardiac pci;  Surgeon: Sam Davis MD;  [facility]: [facility] CARDIAC [facility];  Service: Cardiology    CARDIAC CATHETERIZATION  02/01/2023    Procedure: Cardiac catheterization;  Surgeon: Sam Davis MD;  Location: [facility] CARD[facility]AC [facility];  Service: Cardiology    CARDIAC CATHETERIZATION N/A 02/01/2023    Procedure: Cardiac pci;  Surgeon: Sam Davis MD;  [facility]: [facility]ARD[facility];  Service: Cardiology    CARDIAC CATHETERIZATION N/A 02/01/2023    Procedure: Cardiac Coronary Angiogram;  Surgeon: Sam Davis MD;  Location: [facility]ARD[facility]AC [facility];  Service: Cardiology    CARDIAC CATHETERIZATION N/A 02/01/2023    Procedure: Cardiac other-IVUS;  Surgeon: Sam Davis MD;  Location: [facility] CARDIAC [facility]ATH [facility];  Service: Cardiology    CHOLECYSTECTOMY  "     Percutaneous    COLONOSCOPY      CORONARY ANGIOPLASTY WITH STENT PLACEMENT      CYSTOSCOPY      HEMODIALYSIS ADULT  1/22/2024    HEMODIALYSIS ADULT  01/24/2024    IR LOWER EXTREMITY ANGIOGRAM  9/29/2023    IR LOWER EXTREMITY ANGIOGRAM  02/26/2024    OTHER SURGICAL HISTORY      \"stimulator to control bowel movements\"    CO ESOPHAGOGASTRODUODENOSCOPY TRANSORAL DIAGNOSTIC N/A 09/27/2016    Procedure: ESOPHAGOGASTRODUODENOSCOPY (EGD);  Surgeon: Adele Rowe MD;  Location: [facility] GI LAB;  Service: Gastroenterology    CO LAPAROSCOPY SURG CHOLECYSTECTOMY N/A 02/29/2016    Procedure: LAPAROSCOPIC CHOLECYSTECTOMY ;  Surgeon: Cliff Roman DO;  Location: [facility] Main OR;  Service: General    CO SLCTV CATHJ 3RD+ ORD SLCTV ABDL PEL/LXTR BRNCH Left 9/29/2023    Procedure: Left leg angiogram with intervention;  Surgeon: Michelle Galvan MD;  Location: [facility] MAIN OR;  Service: Vascular    CO SLCTV CATHJ 3RD+ ORD SLCTV ABDL PEL/LXTR BRNCH Left 02/26/2024    Procedure: Left leg angiogram antegrade access, left popliteal angioplasty;  Surgeon: Michelle Galvan MD;  Location: [facility] MAIN OR;  Service: Vascular    ROTATOR CUFF REPAIR Right     SPINAL CORD STIMULATOR IMPLANT      \"Medtronic interstim model # 3058- in lower back to control bowel movements-currently turned off-battery is dead\"    TOE AMPUTATION Right 10/28/2016    Procedure: 3RD TOE AMPUTATION ;  Surgeon: Anjel Salas DPM;  Location: [facility] Main OR;  Service:        ALLERGIES:  No Known Allergies    SOCIAL HISTORY:  Social History     Substance and Sexual Activity   Alcohol Use Never     Social History     Substance and Sexual Activity   Drug Use No     Social History     Tobacco Use   Smoking Status Never   Smokeless Tobacco Never       FAMILY HISTORY:  Family History   Problem Relation Age of Onset    Leukemia Mother     Liver disease Mother     Lung cancer Mother         heavy smoker - 3 ppd    Heart disease Father     Liver disease Father     Diabetes type I " Father     Multiple myeloma Sister     Breast cancer Sister     Urolithiasis Family     Alcohol abuse Neg Hx     Depression Neg Hx     Drug abuse Neg Hx     Substance Abuse Neg Hx     Mental illness Neg Hx        MEDICATIONS:    Current Facility-Administered Medications:     aspirin chewable tablet 81 mg, 81 mg, Oral, Daily, Jose Denny DO    atorvastatin (LIPITOR) tablet 40 mg, 40 mg, Oral, Daily With Dinner, Jose Denny DO    heparin (porcine) subcutaneous injection 5,000 Units, 5,000 Units, Subcutaneous, Q8H MICH **AND** Platelet count, , , Once, Jose Denny DO    metoprolol succinate (TOPROL-XL) 24 hr tablet 25 mg, 25 mg, Oral, Daily, Jose Denny DO    NOREPINEPHRINE 4 MG  ML NSS (CMPD ORDER) infusion, 1-30 mcg/min, Intravenous, Titrated, Jose Denny DO, Held at 03/21/25 0521    prasugrel (EFFIENT) tablet 10 mg, 10 mg, Oral, Daily, Jose Denny DO    sacubitril-valsartan (ENTRESTO) 24-26 MG per tablet 1 tablet, 1 tablet, Oral, Daily, Jose Denny DO    vancomycin (VANCOCIN) IVPB (premix in dextrose) 750 mg 150 mL, 10 mg/kg (Adjusted), Intravenous, Once per day on Monday Wednesday Friday, Jose Denny DO    Current Outpatient Medications:     aspirin 81 mg chewable tablet, Chew 81 mg daily, Disp: , Rfl:     atorvastatin (LIPITOR) 40 mg tablet, Take 1 tablet (40 mg total) by mouth daily with dinner, Disp: 90 tablet, Rfl: 3    metoprolol succinate (TOPROL-XL) 25 mg 24 hr tablet, TAKE 1 TABLET BY MOUTH TWICE A DAY (Patient taking differently: Take 25 mg by mouth daily), Disp: 180 tablet, Rfl: 1    MIDODRINE HCL PO, Take 5 mg by mouth PRN w/ dialysis, Disp: , Rfl:     prasugrel (EFFIENT) tablet, Take 1 tablet (10 mg total) by mouth daily, Disp: 90 tablet, Rfl: 3    sacubitril-valsartan (Entresto) 24-26 MG TABS, Take 1 tablet by mouth in the morning, Disp: 30 tablet, Rfl: 5    Vitamin D3 1.25 MG (06462 UT) capsule, TAKE 1 CAPSULE BY MOUTH ONE TIME PER WEEK, Disp: 12 capsule, Rfl: 4    REVIEW  OF SYSTEMS:  A complete 10 point review of systems was performed and found to be negative unless otherwise noted below or in the HPI.  General: No fevers, chills.   Cardiovascular: No chest pain, shortness of breath, palpitations, leg edema.  Respiratory: No cough, sputum production, shortness of breath.  Gastrointestinal: No nausea, vomiting, abdominal pain, diarrhea.  Genitourinary: No hematuria.    PHYSICAL EXAM:  Current Weight:    First Weight:    Vitals:    03/21/25 0930 03/21/25 1000 03/21/25 1015 03/21/25 1100   BP: 126/58 135/52 125/56 143/60   BP Location: Right arm Right arm Right arm Right arm   Pulse: 74 75 80 83   Resp: 18 18 18 15   Temp:       TempSrc:       SpO2: 93% 97% 95% 99%       Intake/Output Summary (Last 24 hours) at 3/21/2025 1140  Last data filed at 3/21/2025 0811  Gross per 24 hour   Intake 1000 ml   Output --   Net 1000 ml     General:  Awake, alert, appears comfortable and in no acute distress.  Nontoxic.  Skin:  No rash, warm, good skin turgor   Eyes:  PERRL, EOMI, sclerae nonicteric.  no periorbital edema   ENT:  Moist mucous membranes  Neck:  Trachea midline, symmetric.  No JVD.  No carotid bruits.  Chest:few crackles bilateral bases  CVS:  Regular rate and rhythm without murmur, gallop or rub.  S1 and S2 identified and normal.  No S3, S4.   Abdomen:  Soft, nontender, nondistended without masses.  Normal bowel sounds x 4 quadrants.  No bruit.  Extremities:  Warm, pink, motor and sensory intact and well perfused.  No cyanosis, pallor.  No BLE edema.  Neuro:  Awake, alert. Grossly intact  Psych:  Appropriate affect at present time.  Mentating appropriately.   Access: +thrill, bruit LUE AVF, aneurysmal.  Site clear       Invasive Devices: none     Lab Results:   Results from last 7 days   Lab Units 03/21/25  0455 03/17/25  0012   WBC Thousand/uL 4.48 5.40   HEMOGLOBIN g/dL 10.1* 10.0*   HEMATOCRIT % 32.2* 32.3*   PLATELETS Thousands/uL 117* 134*   SODIUM mmol/L 135 136   POTASSIUM  mmol/L 4.8 4.4   CHLORIDE mmol/L 95* 97   CO2 mmol/L 27 26   BUN mg/dL 38* 41*   CREATININE mg/dL 6.02* 7.33*   CALCIUM mg/dL 8.6 8.3*   MAGNESIUM mg/dL 2.0  --    ALK PHOS U/L 117* 122*   ALT U/L 9 7   AST U/L 15 12*     Lab Results   Component Value Date    CALCIUM 8.6 03/21/2025    PHOS 5.9 (H) 07/15/2024   Imaging Studies:  CT chest, abdomen, pelvis without contrast 3/21/2025:  Imaging study and images personally reviewed.  Multiple prominent mediastinal lymph nodes.  Mild cardiomegaly.  Small right pleural effusion, atrophic bilateral kidneys.  No hydronephrosis.  Findings of portal hypertension and splenomegaly and ascites.    CT head without contrast 3/21/2025:  Imaging study and images personally reviewed.  New hypodensity in the right Kevin julien with sparing of the periphery.  Possibly artifactual versus acute infarct.    CXR 3/21/2025:  Imaging study and images personally reviewed.  Mild pulmonary venous congestion.  No consolidation.    EMR, including Care Everywhere, [facility],  [facility] tosin and outpatient notes reviewed.  I have personally reviewed the blood work as stated above and in my note.  I have personally reviewed CXR, CT head, CT chest, abdomen, pelvis imaging studies.  I have personally reviewed primary medical service and previous nephrology note.

## 2025-03-21 NOTE — ASSESSMENT & PLAN NOTE
Patient met SIRS criteria on admission for hypothermia and hypotension, was breifly placed on vaso gtt and given 500 mL bolus with resolution of hypotension  Patient also received cefepime and vancomycin   Patient's son notes that he frequently has problems with hypotension, and that is baseline BP is low  Patient did note 4 episodes of diarrhea overnight.

## 2025-03-21 NOTE — ED ATTENDING ATTESTATION
3/21/2025  I, Awais Machado MD, saw and evaluated the patient. I have discussed the patient with the resident/non-physician practitioner and agree with the resident's/non-physician practitioner's findings, Plan of Care, and MDM as documented in the resident's/non-physician practitioner's note, except where noted. All available labs and Radiology studies were reviewed.  I was present for key portions of any procedure(s) performed by the resident/non-physician practitioner and I was immediately available to provide assistance.       At this point I agree with the current assessment done in the Emergency Department.  I have conducted an independent evaluation of this patient a history and physical is as follows: Patient is a 65 year old male who was sent from nursing facility today for aggressive behavior. No cough. No fever. No N/v/D. No chest pain or sob. Was last seen in this ED on 3/16/25 for chest pain. Is on hemodialysis and gets this qMWF. States he does not make urine. Patient needed our urgent attention. [facility] website checked on this patient and last Rx filled was on 12/30/23 for ativan for 25 day supply. NCAT. No scleral icterus. Moist mucous membranes. Lungs clear. Heart regular. Abdomen soft and nontender. (+) bowel sounds. (+) LE edema. No focal deficits. AAOX3. Normal mood. Differential diagnosis including but not limited to: pneumonia, covid, influenza, viral illness; doubt cellulitis; metabolic abnormality, doubt dehydration; thyroid disease, intracranial process, cardiac etiology, psychiatric disorder. Will check EKG, labs, CXR and CTs. Warming blanket  ordered. Will give IV abx. Will need admission medically.     ED Course   Patient became hypotensive in ED. IVFs given with improvement.       Critical Care Time  Procedures  Critical Care Time Statement: Upon my evaluation, this patient had a high probability of imminent or life-threatening deterioration due to hypothermia, hypotension,  which required my direct attention, intervention, and personal management.  I spent a total of 35 minutes directly providing critical care services, including interpretation of complex medical databases, evaluating for the presence of life-threatening injuries or illnesses, management of organ system failure(s) , complex medical decision making (to support/prevent further life-threatening deterioration)., and IVFs and IV abx and warming blanket . This time is exclusive of procedures, teaching, treating other patients, family meetings, and any prior time recorded by providers other than myself.

## 2025-03-21 NOTE — ASSESSMENT & PLAN NOTE
Lab Results   Component Value Date    EGFR 8 03/21/2025    EGFR 7 03/17/2025    EGFR 13 (L) 01/11/2025    CREATININE 6.02 (H) 03/21/2025    CREATININE 7.33 (H) 03/17/2025    CREATININE 4.64 (H) 01/11/2025     Hgb 10.1 on arrival to ED, consistent with baseline of 9-10    Plan:  CBC daily
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
## 2025-03-21 NOTE — ASSESSMENT & PLAN NOTE
Per patient's son, was previously on VPA but this medication was discontinued during his recent admission in [place] d/t concerns for thrombocytopenia

## 2025-03-21 NOTE — Clinical Note
Case was discussed with  and the patient's admission status was agreed to be  to the service of Dr. Farias

## 2025-03-21 NOTE — ASSESSMENT & PLAN NOTE
Lab Results   Component Value Date    EGFR 8 03/21/2025    EGFR 7 03/17/2025    EGFR 13 (L) 01/11/2025    CREATININE 6.02 (H) 03/21/2025    CREATININE 7.33 (H) 03/17/2025    CREATININE 4.64 (H) 01/11/2025   Patient on MWF HD  Patient was previously on sevelamer and cinacalcet but these were discontinued during recent admission in [place]   Patient does take midodrine 5 mg prior to dialysis    Plan:  Consult nephrology to set up HD  Midodrine 5 mg prior to HD

## 2025-03-21 NOTE — ASSESSMENT & PLAN NOTE
Calcium and magnesium stable  Hx hyperphosphatemia.  Binders recently discontinued during hospitalization at [place] and continuing to monitor as outpatient.  Continue low phosphorus diet  Secondary hyperparathyroidism of renal origin: Sensipar recently discontinued.  Maintained on Hectorol 2 mcg 3 times a week with HD  Renal diet  Continue to monitor and manage as outpatient

## 2025-03-21 NOTE — ASSESSMENT & PLAN NOTE
on maintenance HD MWF @[facility] [place] [place]   Access: LUE AVF  EDW: 94.5 kg  last treatment 3/19/2025  Recently required brief CRRT during hospitalization at [place] at time of cardiac cath w/stenting  electrolytes and acid/base stable  Plan for HD today  next treatment 3/24/2025.  Will plan to continue regular Monday, Wednesday, Friday schedule during hospitalization.

## 2025-03-21 NOTE — ASSESSMENT & PLAN NOTE
Lab Results   Component Value Date    HGBA1C 5.5 11/07/2024   stable  continue to optimize glycemic control  management per primary team

## 2025-03-21 NOTE — TELEPHONE ENCOUNTER
Nurse from [facility] calling for on call to return callback. Patient is with aggressive behavior to staff, verbally aggressive, and throwing stuff.

## 2025-03-21 NOTE — ASSESSMENT & PLAN NOTE
Lab Results   Component Value Date    EGFR 8 03/21/2025    EGFR 7 03/17/2025    EGFR 13 (L) 01/11/2025    CREATININE 6.02 (H) 03/21/2025    CREATININE 7.33 (H) 03/17/2025    CREATININE 4.64 (H) 01/11/2025

## 2025-03-21 NOTE — ASSESSMENT & PLAN NOTE
Depakote recently discontinued secondary to associated mild thrombocytopenia  Management per primary team  CT with new hypodensity in the hemipons of unclear etiology

## 2025-03-21 NOTE — ASSESSMENT & PLAN NOTE
Hgb 10.1, at goal.  Goal Hgb >10.0  on Mircera 60 mcg every 2 weeks and Venofer 50 mg IV weekly as outpatient  trend CBC  transfuse for Hgb <7.0  management per primary team

## 2025-03-22 ENCOUNTER — APPOINTMENT (INPATIENT)
Dept: DIALYSIS | Facility: HOSPITAL | Age: 65
DRG: 314 | End: 2025-03-22
Payer: MEDICARE

## 2025-03-22 PROBLEM — G93.40 ACUTE ENCEPHALOPATHY: Status: ACTIVE | Noted: 2025-03-21

## 2025-03-22 PROBLEM — E87.70 FLUID OVERLOAD: Status: ACTIVE | Noted: 2025-03-22

## 2025-03-22 LAB
ALBUMIN SERPL BCG-MCNC: 3.7 G/DL (ref 3.5–5)
ALP SERPL-CCNC: 124 U/L (ref 34–104)
ALT SERPL W P-5'-P-CCNC: 8 U/L (ref 7–52)
ANION GAP SERPL CALCULATED.3IONS-SCNC: 8 MMOL/L (ref 4–13)
ANISOCYTOSIS BLD QL SMEAR: PRESENT
AST SERPL W P-5'-P-CCNC: 5 U/L (ref 13–39)
ATRIAL RATE: 73 BPM
ATRIAL RATE: 74 BPM
BASOPHILS # BLD AUTO: 0.03 THOUSANDS/ÂΜL (ref 0–0.1)
BASOPHILS NFR BLD AUTO: 1 % (ref 0–1)
BILIRUB SERPL-MCNC: 0.6 MG/DL (ref 0.2–1)
BUN SERPL-MCNC: 28 MG/DL (ref 5–25)
CALCIUM SERPL-MCNC: 8.2 MG/DL (ref 8.4–10.2)
CHLORIDE SERPL-SCNC: 96 MMOL/L (ref 96–108)
CO2 SERPL-SCNC: 27 MMOL/L (ref 21–32)
CREAT SERPL-MCNC: 4.78 MG/DL (ref 0.6–1.3)
EOSINOPHIL # BLD AUTO: 0.1 THOUSAND/ÂΜL (ref 0–0.61)
EOSINOPHIL NFR BLD AUTO: 2 % (ref 0–6)
ERYTHROCYTE [DISTWIDTH] IN BLOOD BY AUTOMATED COUNT: 17.7 % (ref 11.6–15.1)
GFR SERPL CREATININE-BSD FRML MDRD: 11 ML/MIN/1.73SQ M
GLUCOSE SERPL-MCNC: 137 MG/DL (ref 65–140)
HCT VFR BLD AUTO: 30.8 % (ref 36.5–49.3)
HGB BLD-MCNC: 9.9 G/DL (ref 12–17)
IMM GRANULOCYTES # BLD AUTO: 0.03 THOUSAND/UL (ref 0–0.2)
IMM GRANULOCYTES NFR BLD AUTO: 1 % (ref 0–2)
LYMPHOCYTES # BLD AUTO: 0.67 THOUSANDS/ÂΜL (ref 0.6–4.47)
LYMPHOCYTES NFR BLD AUTO: 15 % (ref 14–44)
MAGNESIUM SERPL-MCNC: 1.9 MG/DL (ref 1.9–2.7)
MCH RBC QN AUTO: 32 PG (ref 26.8–34.3)
MCHC RBC AUTO-ENTMCNC: 32.1 G/DL (ref 31.4–37.4)
MCV RBC AUTO: 100 FL (ref 82–98)
MONOCYTES # BLD AUTO: 0.49 THOUSAND/ÂΜL (ref 0.17–1.22)
MONOCYTES NFR BLD AUTO: 11 % (ref 4–12)
MRSA NOSE QL CULT: NORMAL
NEUTROPHILS # BLD AUTO: 3.14 THOUSANDS/ÂΜL (ref 1.85–7.62)
NEUTS SEG NFR BLD AUTO: 70 % (ref 43–75)
NRBC BLD AUTO-RTO: 0 /100 WBCS
OVALOCYTES BLD QL SMEAR: PRESENT
P AXIS: 66 DEGREES
P AXIS: 76 DEGREES
PHOSPHATE SERPL-MCNC: 4.8 MG/DL (ref 2.3–4.1)
PLATELET # BLD AUTO: 94 THOUSANDS/UL (ref 149–390)
PLATELET BLD QL SMEAR: ABNORMAL
PMV BLD AUTO: 11.3 FL (ref 8.9–12.7)
POIKILOCYTOSIS BLD QL SMEAR: PRESENT
POTASSIUM SERPL-SCNC: 3.9 MMOL/L (ref 3.5–5.3)
PR INTERVAL: 170 MS
PR INTERVAL: 190 MS
PROT SERPL-MCNC: 6.2 G/DL (ref 6.4–8.4)
QRS AXIS: 219 DEGREES
QRS AXIS: 231 DEGREES
QRSD INTERVAL: 168 MS
QRSD INTERVAL: 172 MS
QT INTERVAL: 492 MS
QT INTERVAL: 496 MS
QTC INTERVAL: 546 MS
QTC INTERVAL: 546 MS
RBC # BLD AUTO: 3.09 MILLION/UL (ref 3.88–5.62)
RBC MORPH BLD: PRESENT
SODIUM SERPL-SCNC: 131 MMOL/L (ref 135–147)
T WAVE AXIS: 20 DEGREES
T WAVE AXIS: 52 DEGREES
VENTRICULAR RATE: 73 BPM
VENTRICULAR RATE: 74 BPM
WBC # BLD AUTO: 4.46 THOUSAND/UL (ref 4.31–10.16)

## 2025-03-22 PROCEDURE — 99232 SBSQ HOSP IP/OBS MODERATE 35: CPT | Performed by: INTERNAL MEDICINE

## 2025-03-22 PROCEDURE — 97163 PT EVAL HIGH COMPLEX 45 MIN: CPT

## 2025-03-22 PROCEDURE — 99233 SBSQ HOSP IP/OBS HIGH 50: CPT | Performed by: INTERNAL MEDICINE

## 2025-03-22 PROCEDURE — 93010 ELECTROCARDIOGRAM REPORT: CPT | Performed by: INTERNAL MEDICINE

## 2025-03-22 PROCEDURE — 83735 ASSAY OF MAGNESIUM: CPT

## 2025-03-22 PROCEDURE — 5A1D70Z PERFORMANCE OF URINARY FILTRATION, INTERMITTENT, LESS THAN 6 HOURS PER DAY: ICD-10-PCS | Performed by: INTERNAL MEDICINE

## 2025-03-22 PROCEDURE — 80053 COMPREHEN METABOLIC PANEL: CPT

## 2025-03-22 PROCEDURE — 84100 ASSAY OF PHOSPHORUS: CPT

## 2025-03-22 PROCEDURE — 85025 COMPLETE CBC W/AUTO DIFF WBC: CPT

## 2025-03-22 RX ORDER — CEFAZOLIN SODIUM 1 G/50ML
1000 SOLUTION INTRAVENOUS EVERY 12 HOURS
Status: DISCONTINUED | OUTPATIENT
Start: 2025-03-23 | End: 2025-03-24 | Stop reason: HOSPADM

## 2025-03-22 RX ORDER — CEFAZOLIN SODIUM 1 G/50ML
1000 SOLUTION INTRAVENOUS EVERY 8 HOURS
Status: DISCONTINUED | OUTPATIENT
Start: 2025-03-22 | End: 2025-03-22

## 2025-03-22 RX ORDER — MIDODRINE HYDROCHLORIDE 5 MG/1
10 TABLET ORAL ONCE
Status: COMPLETED | OUTPATIENT
Start: 2025-03-22 | End: 2025-03-22

## 2025-03-22 RX ORDER — ACETAMINOPHEN 10 MG/ML
1000 INJECTION, SOLUTION INTRAVENOUS ONCE
Status: DISCONTINUED | OUTPATIENT
Start: 2025-03-22 | End: 2025-03-24 | Stop reason: HOSPADM

## 2025-03-22 RX ORDER — LORAZEPAM 0.5 MG/1
0.5 TABLET ORAL ONCE
Status: DISCONTINUED | OUTPATIENT
Start: 2025-03-22 | End: 2025-03-22

## 2025-03-22 RX ADMIN — Medication 6 MG: at 21:40

## 2025-03-22 RX ADMIN — METOPROLOL SUCCINATE 25 MG: 25 TABLET, EXTENDED RELEASE ORAL at 08:07

## 2025-03-22 RX ADMIN — HEPARIN SODIUM 5000 UNITS: 5000 INJECTION INTRAVENOUS; SUBCUTANEOUS at 13:25

## 2025-03-22 RX ADMIN — ASPIRIN 81 MG CHEWABLE TABLET 81 MG: 81 TABLET CHEWABLE at 08:07

## 2025-03-22 RX ADMIN — CEFTRIAXONE SODIUM 1000 MG: 10 INJECTION, POWDER, FOR SOLUTION INTRAVENOUS at 05:55

## 2025-03-22 RX ADMIN — HEPARIN SODIUM 5000 UNITS: 5000 INJECTION INTRAVENOUS; SUBCUTANEOUS at 21:40

## 2025-03-22 RX ADMIN — MIDODRINE HYDROCHLORIDE 10 MG: 5 TABLET ORAL at 15:33

## 2025-03-22 RX ADMIN — HEPARIN SODIUM 5000 UNITS: 5000 INJECTION INTRAVENOUS; SUBCUTANEOUS at 06:05

## 2025-03-22 RX ADMIN — ATORVASTATIN CALCIUM 40 MG: 40 TABLET, FILM COATED ORAL at 17:23

## 2025-03-22 RX ADMIN — PRASUGREL 10 MG: 10 TABLET, FILM COATED ORAL at 08:07

## 2025-03-22 RX ADMIN — Medication 6 MG: at 14:33

## 2025-03-22 RX ADMIN — MIDODRINE HYDROCHLORIDE 10 MG: 5 TABLET ORAL at 11:19

## 2025-03-22 NOTE — ASSESSMENT & PLAN NOTE
Hgb slightly trended down to 9.9 g/dL.  Goal Hgb >10.0  on Mircera 60 mcg every 2 weeks and Venofer 50 mg IV weekly as outpatient  Continue to monitor hemoglobin as inpatient

## 2025-03-22 NOTE — PROGRESS NOTES
.Vancomycin IV Pharmacy-to-Dose Consultation     Vancomycin has been discontinued.  Pharmacy will sign off.  Please contact or re-consult with questions.    Chico Santiago, Pharmacist

## 2025-03-22 NOTE — PLAN OF CARE
"  Problem: PHYSICAL THERAPY ADULT  Goal: Performs mobility at highest level of function for planned discharge setting.  See evaluation for individualized goals.  Description: Treatment/Interventions: Functional transfer training, LE strengthening/ROM, Therapeutic exercise, Endurance training, Cognitive reorientation, Patient/family training, Equipment eval/education, Bed mobility, Gait training, Spoke to nursing, Spoke to MD     See flowsheet documentation for full assessment, interventions and recommendations.  Note: Prognosis: Fair  Problem List: Decreased endurance, Impaired balance, Decreased mobility, Decreased cognition, Impaired judgement, Decreased safety awareness, Decreased skin integrity, Obesity  Assessment: Pt seen for PT evaluation for mobility assessment & discharge needs. Pt admitted 3/21/2025 w/ agitation, fall, hypothermia and hypotension, dx Acute encephalopathy. During PT IE, pt requires MIN-MODA for STS transfers with RW, and TEVIN for ambulation of 30ft with RW. Pt with highly variable emotional status vs agitation t/o session, frequently repeating that he wants to \"go home\" or \"to fucking die\". SLIM MD aware. Pt displays above outlined functional impairments & limitations, and presents below his baseline level of functional mobility. The AM-PAC & Barthel Index outcome tools were used to assist in determining pt safety w/ mobility/self care & appropriate d/c recommendations, see above for scores. Pt is at risk of falls d/t multiple comorbidities, impulsivity, h/o falls, impaired balance, impaired cognition, impaired insight/safety awareness, use of ambulatory aid, acuity of medical illness, ongoing medical treatment of primary dx, abnormal lab values, and polypharmacy. Pt's clinical presentation is currently unstable/unpredictable as seen in pt's presentation of varying levels of cognitive performance, increased fall risk, new onset of impairment of functional mobility, decreased endurance, and " new onset of weakness. Pt will benefit from continued PT services in order to address impairments, decrease risk of falls, maximize independence w/ fnxl mobility, & ensure safety w/ mobility for transition to next level of care. Based on pt presentation & impairments, pt would most appropriately benefit from Level II (moderate PT intensity) resources upon d/c.      Rehab Resource Intensity Level, PT: II (Moderate Resource Intensity)    See flowsheet documentation for full assessment.

## 2025-03-22 NOTE — ASSESSMENT & PLAN NOTE
Lab Results   Component Value Date    EGFR 11 03/22/2025    EGFR 8 03/21/2025    EGFR 7 03/17/2025    CREATININE 4.78 (H) 03/22/2025    CREATININE 6.02 (H) 03/21/2025    CREATININE 7.33 (H) 03/17/2025   Patient on MWF HD  Patient was previously on sevelamer and cinacalcet but these were discontinued during recent admission in [place]   Patient does take midodrine 5 mg prior to dialysis    Plan:  Consult nephrology to set up HD  Midodrine 5 mg prior to HD

## 2025-03-22 NOTE — ASSESSMENT & PLAN NOTE
Lab Results   Component Value Date    EGFR 11 03/22/2025    EGFR 8 03/21/2025    EGFR 7 03/17/2025    CREATININE 4.78 (H) 03/22/2025    CREATININE 6.02 (H) 03/21/2025    CREATININE 7.33 (H) 03/17/2025     Hgb 10.1 on arrival to ED, consistent with baseline of 9-10    Plan:  CBC daily

## 2025-03-22 NOTE — PROGRESS NOTES
Progress Note - Cardiology   Asad Galloway 65 y.o. male MRN: [id number]  Unit/Bed#: S -01 Encounter: [id number]        Problem List:  Principal Problem:    AMS (altered mental status)  Active Problems:    ESRD on dialysis ([facility])    Primary hypertension    Hyponatremia    Aortic stenosis    Mood disorder (HCC)    Type 2 diabetes mellitus ([facility])    Chronic kidney disease-mineral and bone disorder    Anemia due to chronic kidney disease, on chronic dialysis ([facility])    HFrEF (heart failure with reduced ejection fraction) ([facility])    SIRS (systemic inflammatory response syndrome) ([facility])    Fluid overload      ASSESSMENT:  Altered mental status  Patient brought in by ambulance from the nursing home after being found in his own stool and confused  3/21/2025 in the ER patient back to baseline mental status  Abnormal troponin 246 --> 216 --> 235  BNP 2769  EKG shows sinus rhythm with right bundle branch block and nonspecific ST abnormalities in lateral leads prior EKGs  Coronary artery disease --> complicated history  5/2/2022 C: First marginal 95%, mid LAD 50%, mid circumflex 50%, RPAV 90% (distal), distal RCA 50%, PCI/ADE x 1 to first marginal  6/9/2022 PCI/ADE to proximal to mid LAD, PCI/ADE to mid circumflex ([facility])  6/27/2022 [facility] with 70% mid LAD treated with thrombectomy (lesion felt to be thrombosed, [facility])  8/5/2022 [facility]: Proximal to mid LAD 70%, RPDA 80%, distal RCA 70%, first obtuse marginal 70%, patent stent in mid circumflex, balloon angioplasty to in-stent thrombosis of LAD   2/1/2023 [facility]: First obtuse marginal 100%, RPAV 100%, mid LAD 80%, RPDA 60%, PCI/ADE x 1 to in-stent restenosis of LAD, attempt made to wire occluded OM branch for wire could not be advanced  8/22/2024 [facility]: Large anterolateral, inferolateral, lateral perfusion defect that is partially reversible appears to be infarction with mona-infarct ischemia  2/27/2025 [facility]: Mid circumflex 10%, first obtuse marginal 100%, first %,  severe in-stent restenosis of LAD and  of OM1 ([facility])  3/17/2025 [facility] LM normal, proximal LAD 90%, circumflex luminal irregularities, first obtuse marginal 100%, right system not described, successful balloon angioplasty to proximal LAD with 0% residual stenosis ([facility])  Ischemic cardiomyopathy with partial recovery of EF  EF has ranged between 25-45% over several years  3/1/2025 echo: LVEF 35-40%, global hypokinesis with regional variations, moderately reduced RV systolic function, severe left atrial dilation moderate to severely calcified aortic valve with moderate to severe aortic stenosis  Aortic stenosis  3/1/2025 echo: LVEF 35-40%, global hypokinesis with regional variations, moderately reduced RV systolic function, severe left atrial dilation moderate to severely calcified aortic valve with moderate to severe aortic stenosis  Beta-blocker: Toprol-XL 25 twice daily  ACE/ARB/ARNI: Entresto 24-26 twice daily  Volume management: Dialysis  ESRD on dialysis  History of DVT  Maintained on Eliquis 5 mg twice daily  History of RLE DVT July 2024  Lower extremity Doppler in November 2024 showed chronic DVT. Patient seen by vascular surgery at this time and as he had completed 3 months of anticoagulation he was recommended to discontinue Eliquis and remain on low-dose aspirin  Undergoing T[facility]R workup with [facility] in [facility]  History of CVA  Noted, with residual functional and cognitive deficits     PLAN/ DISCUSSION:     Abnormal troponin  Currently chest pain-free and hemodynamically stable  Suspect non-MI related from chronic CAD, aortic stenosis, heart failure  No immediate plan for ischemic workup  Coronary artery disease  Continue dual antiplatelet therapy  Continue statin  Continue beta-blocker  He does have residual underlying CAD with limited options for antianginals due to low blood pressure.  If need be Ranexa could be added 500 twice daily.  NSVT  8 beat run on telemetry  overnight 3/21/2025  Asymptomatic  If possible could try to increase beta-blocker  Aortic stenosis  Ongoing TAVR work in [facility] as outpatient  Ischemic cardiomyopathy  Volume management via dialysis  Continue Toprol-XL 25 daily  Entresto held by primary team for episode of hypotension  Would restart Entresto since blood pressure is now stable    Subjective:  Feeling well today  Wants to go home  No chest pain    Vitals:  Vitals:    03/21/25 1925 03/22/25 0600   Weight: 99.8 kg (220 lb) 99.9 kg (220 lb 3.8 oz)   ,  Vitals:    03/21/25 2256 03/22/25 0337 03/22/25 0600 03/22/25 0802   BP: 112/62 130/51  (!) 113/48   BP Location: Right arm   Right arm   Pulse: 82 86  86   Resp: 20   18   Temp: 98.3 °F (36.8 °C)   98.9 °F (37.2 °C)   TempSrc: Oral   Oral   SpO2: 100% 95%  97%   Weight:   99.9 kg (220 lb 3.8 oz)    Height:           Exam:  General: Alert awake and oriented, no acute distress  Heart:  Regular rate and rhythm, no murmurs, Normal S1, no edema    Respiratory effort/ Lungs:  Breathing comfortably on room air, clear bilaterally without wheezing, rales, crackles   Abdominal: Non-tender to palpation, + bowel sounds, soft, no masses or distension  Lower Limbs:  No edema            Telemetry:       Sinus rhythm with rates in 80s, EP for nonsustained VT    Medications:    Current Facility-Administered Medications:     aspirin chewable tablet 81 mg, 81 mg, Oral, Daily, Jose Denny DO, 81 mg at 03/22/25 0807    atorvastatin (LIPITOR) tablet 40 mg, 40 mg, Oral, Daily With Dinner, Jose Denny DO, 40 mg at 03/21/25 1812    ceftriaxone (ROCEPHIN) 1 g/50 mL in dextrose IVPB, 1,000 mg, Intravenous, Q24H, Jose Denny DO, Last Rate: 100 mL/hr at 03/22/25 0555, 1,000 mg at 03/22/25 0555    heparin (porcine) subcutaneous injection 5,000 Units, 5,000 Units, Subcutaneous, Q8H MICH, 5,000 Units at 03/22/25 0605 **AND** [COMPLETED] Platelet count, , , Once, Jose Denny, DO    metoprolol succinate (TOPROL-XL) 24 hr  tablet 25 mg, 25 mg, Oral, Daily, Jose Denny DO, 25 mg at 03/22/25 0807    midodrine (PROAMATINE) tablet 10 mg, 10 mg, Oral, Before Dialysis, Lexi Page PA-C, 10 mg at 03/21/25 1431    NOREPINEPHRINE 4 MG  ML NSS (CMPD ORDER) infusion, 1-30 mcg/min, Intravenous, Titrated, Jose Denny DO, Held at 03/21/25 0521    prasugrel (EFFIENT) tablet 10 mg, 10 mg, Oral, Daily, Jose Denny DO, 10 mg at 03/22/25 0807    [Held by provider] sacubitril-valsartan (ENTRESTO) 24-26 MG per tablet 1 tablet, 1 tablet, Oral, Daily, Jose Denny DO, 1 tablet at 03/21/25 1223    vancomycin (VANCOCIN) IVPB (premix in dextrose) 750 mg 150 mL, 10 mg/kg (Adjusted), Intravenous, Once per day on Monday Wednesday Friday, Jose Denny DO, Stopped at 03/21/25 1900      Labs/Data:        Results from last 7 days   Lab Units 03/22/25  0636 03/21/25  1439 03/21/25  0455 03/17/25  0012   WBC Thousand/uL 4.46  --  4.48 5.40   HEMOGLOBIN g/dL 9.9*  --  10.1* 10.0*   HEMATOCRIT % 30.8*  --  32.2* 32.3*   PLATELETS Thousands/uL 94* 100* 117* 134*     Results from last 7 days   Lab Units 03/22/25  0636 03/21/25  0455 03/17/25  0012   POTASSIUM mmol/L 3.9 4.8 4.4   CHLORIDE mmol/L 96 95* 97   CO2 mmol/L 27 27 26   BUN mg/dL 28* 38* 41*   CREATININE mg/dL 4.78* 6.02* 7.33*

## 2025-03-22 NOTE — ASSESSMENT & PLAN NOTE
Depakote recently discontinued secondary to associated mild thrombocytopenia  CT with new hypodensity in the hemipons of unclear etiology.  Noted plan for MRI.  Hopefully MRI without contrast.  If plan for MRI with gadolinium patient will need dialysis immediately after MRI preferably within 3 hours and again repeat dialysis next day to remove gadolinium to decrease the risk of NSF

## 2025-03-22 NOTE — ASSESSMENT & PLAN NOTE
Patient met SIRS criteria on admission for hypothermia and hypotension, was breifly placed on vaso gtt and given 500 mL bolus with resolution of hypotension  Patient also received cefepime and vancomycin   Patient's son notes that he frequently has problems with hypotension, and that is baseline BP is low  Patient did note 4 episodes of diarrhea overnight.  Source of infection is left pinky with concerning wounds.     Plan:  Discontinued ceftriaxone and Vancomycin   Start cefazolin   Wound care consult

## 2025-03-22 NOTE — PLAN OF CARE

## 2025-03-22 NOTE — PROGRESS NOTES
Progress Note - Hospitalist   Name: Asad Galloway 65 y.o. male I MRN: [id number]  Unit/Bed#: S -01 I Date of Admission: 3/21/2025   Date of Service: 3/22/2025 I Hospital Day: 1    Assessment & Plan  Acute encephalopathy  Patient presents from [facility] after he was found covered in feces this morning and throwing things at staff  On arrival to ED patient mets SIRS criteria for hypothermia and hypotension  Trop 246->216->235, WBC 4.48, Lactic 2.0, Procal 0.24, TSH 6.133, Viral Resp panel negative  CTH: New interval hypodensity in R julien, possibly artifactual  On exam patient oriented to person and place, intermittently agitated particularly when speaking about [facility], otherwise no focal neurologic deficits  Extremity agitated in the afternoon. Threatened to leave     Plan:  MRI Brain pending   Follow-up on blood culture  Delirium precautions  Continue ceftriaxone and Vancomycin  Per wife melatonin helps calm him down.All psych medication recently held. Family requesting we not give ativan or antipsychotics   Will undergo dialysis today  Once dose of tylenol for pain    SIRS (systemic inflammatory response syndrome) (HCC)  Patient met SIRS criteria on admission for hypothermia and hypotension, was breifly placed on vaso gtt and given 500 mL bolus with resolution of hypotension  Patient also received cefepime and vancomycin   Patient's son notes that he frequently has problems with hypotension, and that is baseline BP is low  Patient did note 4 episodes of diarrhea overnight.  Source of infection is left pinky with concerning wounds.     Plan:  Discontinued ceftriaxone and Vancomycin   Start cefazolin   Wound care consult  HFrEF (heart failure with reduced ejection fraction) ([facility])  Wt Readings from Last 3 Encounters:   03/22/25 101 kg (222 lb 0.1 oz)   03/19/25 98.4 kg (217 lb)   02/19/25 96.2 kg (212 lb)     Echo 3/1/25 EF 35-40%, moderate systolic dysfunction, G3DD, global hypokinesis, severely dilated LA,  moderate to severe AS and TR.  Patient recently underwent PCI on 3/6/25 at [place] with restenting of LAD, discharged on 3/14 with plans to follow-up for assessment for potential TAVR  His hospitalization was complicated by hypotension that required ICU level of care and pressor support    Home med: Metoprolol succinate 25 mg qHS, Entresto 24-26 mg qHS, ASA 81 mg, Prasugrel 10 mg QD, Atorvastatin 40 mg QD    Plan:  Continue home regimen  Hold presugrel in the setting of possible stroke  Possible cardiolgy consult  ESRD on dialysis ([facility])  Lab Results   Component Value Date    EGFR 11 03/22/2025    EGFR 8 03/21/2025    EGFR 7 03/17/2025    CREATININE 4.78 (H) 03/22/2025    CREATININE 6.02 (H) 03/21/2025    CREATININE 7.33 (H) 03/17/2025   Patient on MWF HD  Patient was previously on sevelamer and cinacalcet but these were discontinued during recent admission in [place]   Patient does take midodrine 5 mg prior to dialysis    Plan:  Consult nephrology to set up HD  Midodrine 5 mg prior to HD  Primary hypertension  Home med: Metoprolol succinate 25 mg qHS, Entresto 24-26 mg qHS  Mood disorder ([facility])  Per patient's son, was previously on VPA but this medication was discontinued during his recent admission in [place] d/t concerns for thrombocytopenia  Type 2 diabetes mellitus ([facility])  Lab Results   Component Value Date    HGBA1C 5.5 11/07/2024       Recent Labs     03/21/25  0441   POCGLU 116       Blood Sugar Average: Last 72 hrs:  (P) 116    No home medications at this time.  HgbA1c 5.5    Plan:  CHO control diet    Anemia due to chronic kidney disease, on chronic dialysis ([facility])  Lab Results   Component Value Date    EGFR 11 03/22/2025    EGFR 8 03/21/2025    EGFR 7 03/17/2025    CREATININE 4.78 (H) 03/22/2025    CREATININE 6.02 (H) 03/21/2025    CREATININE 7.33 (H) 03/17/2025     Hgb 10.1 on arrival to ED, consistent with baseline of 9-10    Plan:  CBC daily  Hyponatremia    Fluid overload  Plan HD today     VTE Pharmacologic  Prophylaxis: VTE Score: 6 High Risk (Score >/= 5) - Pharmacological DVT Prophylaxis Contraindicated. Sequential Compression Devices Ordered.    Mobility:   Basic Mobility Inpatient Raw Score: 15  JH-HLM Goal: 4: Move to chair/commode  JH-HLM Achieved: 7: Walk 25 feet or more  JH-HLM Goal NOT achieved. Continue with multidisciplinary rounding and encourage appropriate mobility to improve upon JH-HLM goals.    Patient Centered Rounds: I performed bedside rounds with nursing staff today.   Discussions with Specialists or Other Care Team Provider: Nephrology     Education and Discussions with Family / Patient: Updated  (wife) at bedside.    Current Length of Stay: 1 day(s)  Current Patient Status: Inpatient   Certification Statement: The patient will continue to require additional inpatient hospital stay due to volume overload and SIR  Discharge Plan: Anticipate discharge in 24-48 hrs to rehab facility.    Code Status: Level 1 - Full Code    Subjective   No acute event overnight. This morning, patient was calm, however, became agitated in the afternoon. Denied abdominal pain, fever,chill, nausea and vomiting.     Objective :  Temp:  [97 °F (36.1 °C)-98.9 °F (37.2 °C)] 98.9 °F (37.2 °C)  HR:  [81-89] 86  BP: ()/(48-94) 113/48  Resp:  [18-25] 18  SpO2:  [85 %-100 %] 97 %  O2 Device: None (Room air)    Body mass index is 32.78 kg/m².     Input and Output Summary (last 24 hours):     Intake/Output Summary (Last 24 hours) at 3/22/2025 1419  Last data filed at 3/22/2025 1313  Gross per 24 hour   Intake 1890 ml   Output 3500 ml   Net -1610 ml       Physical Exam  Vitals and nursing note reviewed.   Constitutional:       General: He is not in acute distress.     Appearance: He is well-developed.   HENT:      Head: Normocephalic and atraumatic.      Mouth/Throat:      Mouth: Mucous membranes are moist.   Eyes:      Conjunctiva/sclera: Conjunctivae normal.   Cardiovascular:      Rate and Rhythm: Normal rate  and regular rhythm.      Heart sounds: No murmur heard.  Pulmonary:      Effort: Pulmonary effort is normal. No respiratory distress.      Breath sounds: Rhonchi present.   Abdominal:      General: Abdomen is flat. Bowel sounds are normal.      Palpations: Abdomen is soft.      Tenderness: There is no abdominal tenderness.   Musculoskeletal:         General: No swelling.      Cervical back: Neck supple.      Right lower leg: Edema present.      Left lower leg: Edema present.   Skin:     General: Skin is warm and dry.      Capillary Refill: Capillary refill takes less than 2 seconds.   Neurological:      Mental Status: He is alert and oriented to person, place, and time.   Psychiatric:         Mood and Affect: Mood normal.           Lines/Drains:        Telemetry:  Telemetry Orders (From admission, onward)               24 Hour Telemetry Monitoring  Continuous x 24 Hours (Telem)        Comments: Had Nonsustained v. Tachy; elevated troponin; hypotension   Expiring   Question:  Reason for 24 Hour Telemetry  Answer:  Arrhythmias requiring acute medical intervention / PPM or ICD malfunction                     Telemetry Reviewed: Normal Sinus Rhythm  Indication for Continued Telemetry Use: Acute CVA               Lab Results: I have reviewed the following results:   Results from last 7 days   Lab Units 03/22/25  0636   WBC Thousand/uL 4.46   HEMOGLOBIN g/dL 9.9*   HEMATOCRIT % 30.8*   PLATELETS Thousands/uL 94*   SEGS PCT % 70   LYMPHO PCT % 15   MONO PCT % 11   EOS PCT % 2     Results from last 7 days   Lab Units 03/22/25  0636   SODIUM mmol/L 131*   POTASSIUM mmol/L 3.9   CHLORIDE mmol/L 96   CO2 mmol/L 27   BUN mg/dL 28*   CREATININE mg/dL 4.78*   ANION GAP mmol/L 8   CALCIUM mg/dL 8.2*   ALBUMIN g/dL 3.7   TOTAL BILIRUBIN mg/dL 0.60   ALK PHOS U/L 124*   ALT U/L 8   AST U/L 5*   GLUCOSE RANDOM mg/dL 137     Results from last 7 days   Lab Units 03/21/25  0455   INR  1.05     Results from last 7 days   Lab Units  03/21/25  0441   POC GLUCOSE mg/dl 116         Results from last 7 days   Lab Units 03/21/25  0455   LACTIC ACID mmol/L 2.0   PROCALCITONIN ng/ml 0.24       Recent Cultures (last 7 days):   Results from last 7 days   Lab Units 03/21/25  0455   BLOOD CULTURE  No Growth at 24 hrs.  No Growth at 24 hrs.       Imaging Results Review: I reviewed radiology reports from this admission including: CT head.  Other Study Results Review: EKG was reviewed.     Last 24 Hours Medication List:     Current Facility-Administered Medications:     aspirin chewable tablet 81 mg, Daily    atorvastatin (LIPITOR) tablet 40 mg, Daily With Dinner    ceftriaxone (ROCEPHIN) 1 g/50 mL in dextrose IVPB, Q24H, Last Rate: 1,000 mg (03/22/25 0555)    heparin (porcine) subcutaneous injection 5,000 Units, Q8H MICH **AND** [COMPLETED] Platelet count, Once    LORazepam (ATIVAN) tablet 0.5 mg, Once    melatonin tablet 10.5 mg, HS    metoprolol succinate (TOPROL-XL) 24 hr tablet 25 mg, Daily    midodrine (PROAMATINE) tablet 10 mg, Before Dialysis    prasugrel (EFFIENT) tablet 10 mg, Daily    [Held by provider] sacubitril-valsartan (ENTRESTO) 24-26 MG per tablet 1 tablet, Daily    vancomycin (VANCOCIN) IVPB (premix in dextrose) 750 mg 150 mL, Once per day on Monday Wednesday Friday, Last Rate: Stopped (03/21/25 1900)    Administrative Statements   Today, Patient Was Seen By: Yadira Garcia MD      **Please Note: This note may have been constructed using a voice recognition system.**

## 2025-03-22 NOTE — PROGRESS NOTES
Progress Note - Nephrology   Name: Asad Galloway 65 y.o. male I MRN: [id number]  Unit/Bed#: S -01 I Date of Admission: 3/21/2025   Date of Service: 3/22/2025 I Hospital Day: 1    Assessment & Plan  ESRD on dialysis (HCC)  on maintenance HD MWF @[facility]   Access: LUE AVF  EDW: 94.5 kg  Recently required brief CRRT during hospitalization at [place] at time of cardiac cath w/stenting  Status post intermittent HD on 3/21 with 3000 mL of fluid removal..  Bed scale weight today a.m. was 99.9 kg indicating patient is still above target weight.  Plan for dialysis again today for more fluid removal.  Would recommend checking standing weight if possible.  Continue midodrine 10 mg before dialysis    Primary hypertension  Initially BP soft, s/p  ml bolus x 2 then blood pressure improved after admission  +hx intradialytic hypotension  Home Rx: Entresto 24-26 mg daily, Toprol-XL 50 mg daily, midodrine 10 mg 3 times a week with HD  Continue midodrine with HD, continue to hold Entresto for now.  If blood pressure remains low may need to start standing dose of midodrine.  Okay to continue metoprolol with hold parameters  Avoid hypotension    Hyponatremia  -Sodium 131 meq/L, recommend fluid restriction  Fluid overload  -Continue UF with HD as tolerated  HFrEF (heart failure with reduced ejection fraction) ([facility])  Wt Readings from Last 3 Encounters:   03/22/25 99.9 kg (220 lb 3.8 oz)   03/19/25 98.4 kg (217 lb)   02/19/25 96.2 kg (212 lb)     echo 3/1/2025:  EF 35-40% with G2 DD, moderate to severe AS, mild to moderate MR, mild to moderate TR  on beta blockers and Entresto  Appears clinically fluid overloaded and is above target weight, continue UF with HD, extra HD today.  Hold Entresto in the setting of hypotension  Patient was seen by cardiology during hospital admission for complaint of chest pain was told to continue metoprolol and Entresto and could add Ranexa as per cardiology note    Anemia due to chronic  kidney disease, on chronic dialysis (HCC)  Hgb slightly trended down to 9.9 g/dL.  Goal Hgb >10.0  on Mircera 60 mcg every 2 weeks and Venofer 50 mg IV weekly as outpatient  Continue to monitor hemoglobin as inpatient    Chronic kidney disease-mineral and bone disorder  Calcium and magnesium stable  Hx hyperphosphatemia.  Binders recently discontinued during hospitalization at [place] and continuing to monitor as outpatient.  Continue low phosphorus diet  Secondary hyperparathyroidism of renal origin: Sensipar recently discontinued.  Maintained on Hectorol 2 mcg 3 times a week with HD  Renal diet . Continue to monitor and manage as outpatient    AMS (altered mental status)  Depakote recently discontinued secondary to associated mild thrombocytopenia  CT with new hypodensity in the hemipons of unclear etiology.  Noted plan for MRI.  Hopefully MRI without contrast.  If plan for MRI with gadolinium patient will need dialysis immediately after MRI preferably within 3 hours and again repeat dialysis next day to remove gadolinium to decrease the risk of NSF    Type 2 diabetes mellitus (HCC)  Lab Results   Component Value Date    HGBA1C 5.5 11/07/2024   stable  continue to optimize glycemic control  management per primary team    Aortic stenosis  Scheduled for TAVR at [place] mid April  S/p recent cardiac cath with PCI      I have reviewed the nephrology recommendations including plan for extra hemodialysis treatment today, with with primary team, and we are in agreement with renal plan including the information outlined above.     Subjective   Patient is more awake and alert.  No shortness of breath    Objective :  Temp:  [97 °F (36.1 °C)-98.9 °F (37.2 °C)] 98.9 °F (37.2 °C)  HR:  [74-89] 86  BP: ()/(48-94) 113/48  Resp:  [15-25] 18  SpO2:  [85 %-100 %] 97 %  O2 Device: None (Room air)    Current Weight: Weight - Scale: 99.9 kg (220 lb 3.8 oz)  First Weight: Weight - Scale: 99.8 kg (220 lb)  I/O         03/20  0701  03/21 0700 03/21 0701  03/22 0700 03/22 0701  03/23 0700    P.O.  480 720    I.V. (mL/kg)  500 (5)     IV Piggyback 250 900     Total Intake(mL/kg) 250 1880 (18.8) 720 (7.2)    Other  3500     Total Output  3500     Net +250 -1620 +720                 Physical Exam   General:  Ill looking, awake.  Eyes: Conjunctivae pink,  Sclera anicteric  ENT: lips and mucous membranes moist  Neck: supple   Chest: Clear to Auscultation both lungs,  no crackles, ronchus or wheezing.  CVS: S1 & S2 present, normal rate, regular rhythm, no murmur.  Abdomen: soft, non-tender, non-distended, Bowel sounds normoactive  Extremities: Bilateral trace lower extremity edema, right more than left  Skin: no rash  Neuro: awake, alert, oriented x 3   Psych: Mood and affect appropriate    Medications:    Current Facility-Administered Medications:     aspirin chewable tablet 81 mg, 81 mg, Oral, Daily, Jose Denny DO, 81 mg at 03/22/25 0807    atorvastatin (LIPITOR) tablet 40 mg, 40 mg, Oral, Daily With Dinner, Jose Denny DO, 40 mg at 03/21/25 1812    ceftriaxone (ROCEPHIN) 1 g/50 mL in dextrose IVPB, 1,000 mg, Intravenous, Q24H, Jose Denny DO, Last Rate: 100 mL/hr at 03/22/25 0555, 1,000 mg at 03/22/25 0555    heparin (porcine) subcutaneous injection 5,000 Units, 5,000 Units, Subcutaneous, Q8H MICH, 5,000 Units at 03/22/25 0605 **AND** [COMPLETED] Platelet count, , , Once, Jose Denny DO    metoprolol succinate (TOPROL-XL) 24 hr tablet 25 mg, 25 mg, Oral, Daily, Jose Denny DO, 25 mg at 03/22/25 0807    midodrine (PROAMATINE) tablet 10 mg, 10 mg, Oral, Before Dialysis, GAL AhumadaC, 10 mg at 03/21/25 1431    NOREPINEPHRINE 4 MG  ML NSS (CMPD ORDER) infusion, 1-30 mcg/min, Intravenous, Titrated, Jose Denny DO, Held at 03/21/25 0521    prasugrel (EFFIENT) tablet 10 mg, 10 mg, Oral, Daily, Jose Denny DO, 10 mg at 03/22/25 0807    [Held by provider] sacubitril-valsartan (ENTRESTO) 24-26 MG per tablet 1  "tablet, 1 tablet, Oral, Daily, Jose Denny , 1 tablet at 03/21/25 1223    vancomycin (VANCOCIN) IVPB (premix in dextrose) 750 mg 150 mL, 10 mg/kg (Adjusted), Intravenous, Once per day on Monday Wednesday Friday, Jose Denny , Stopped at 03/21/25 1900      Lab Results: I have reviewed the following results:  Results from last 7 days   Lab Units 03/22/25  0636 03/21/25  1439 03/21/25  0455 03/17/25  0012   WBC Thousand/uL 4.46  --  4.48 5.40   HEMOGLOBIN g/dL 9.9*  --  10.1* 10.0*   HEMATOCRIT % 30.8*  --  32.2* 32.3*   PLATELETS Thousands/uL 94* 100* 117* 134*   POTASSIUM mmol/L 3.9  --  4.8 4.4   CHLORIDE mmol/L 96  --  95* 97   CO2 mmol/L 27  --  27 26   BUN mg/dL 28*  --  38* 41*   CREATININE mg/dL 4.78*  --  6.02* 7.33*   CALCIUM mg/dL 8.2*  --  8.6 8.3*   MAGNESIUM mg/dL 1.9  --  2.0  --    PHOSPHORUS mg/dL 4.8*  --   --   --    ALBUMIN g/dL 3.7  --  4.0 3.7       Administrative Statements     Portions of the record may have been created with voice recognition software. Occasional wrong word or \"sound a like\" substitutions may have occurred due to the inherent limitations of voice recognition software. Read the chart carefully and recognize, using context, where substitutions have occurred.If you have any questions, please contact the dictating provider.  "

## 2025-03-22 NOTE — ASSESSMENT & PLAN NOTE
Initially BP soft, s/p  ml bolus x 2 then blood pressure improved after admission  +hx intradialytic hypotension  Home Rx: Entresto 24-26 mg daily, Toprol-XL 50 mg daily, midodrine 10 mg 3 times a week with HD  Continue midodrine with HD, continue to hold Entresto for now.  If blood pressure remains low may need to start standing dose of midodrine.  Okay to continue metoprolol with hold parameters  Avoid hypotension

## 2025-03-22 NOTE — PLAN OF CARE
Problem: Potential for Falls  Goal: Patient will remain free of falls  Description: INTERVENTIONS:- Educate patient/family on patient safety including physical limitations- Instruct patient to call for assistance with activity - Consult OT/PT to assist with strengthening/mobility - Keep Call bell within reach- Keep bed low and locked with side rails adjusted as appropriate- Keep care items and personal belongings within reach- Initiate and maintain comfort rounds- Make Fall Risk Sign visible to staff- Offer Toileting every 2 Hours, in advance of need- Initiate/Maintain bed and chair alarm- Obtain necessary fall risk management equipment: - Apply yellow socks and bracelet for high fall risk patients- Consider moving patient to room near nurses station  Outcome: Progressing     Problem: Prexisting or High Potential for Compromised Skin Integrity  Goal: Skin integrity is maintained or improved  Description: INTERVENTIONS:- Identify patients at risk for skin breakdown- Assess and monitor skin integrity- Assess and monitor nutrition and hydration status- Monitor labs - Assess for incontinence - Turn and reposition patient- Assist with mobility/ambulation- Relieve pressure over bony prominences- Avoid friction and shearing- Provide appropriate hygiene as needed including keeping skin clean and dry- Evaluate need for skin moisturizer/barrier cream- Collaborate with interdisciplinary team - Patient/family teaching- Consider wound care consult   Outcome: Progressing     Problem: METABOLIC, FLUID AND ELECTROLYTES - ADULT  Goal: Electrolytes maintained within normal limits  Description: INTERVENTIONS:- Monitor labs and assess patient for signs and symptoms of electrolyte imbalances- Administer electrolyte replacement as ordered- Monitor response to electrolyte replacements, including repeat lab results as appropriate- Instruct patient on fluid and nutrition as appropriate  Outcome: Progressing  Goal: Fluid balance  maintained  Description: INTERVENTIONS:- Monitor labs - Monitor I/O and WT- Instruct patient on fluid and nutrition as appropriate- Assess for signs & symptoms of volume excess or deficit  Outcome: Progressing

## 2025-03-22 NOTE — ASSESSMENT & PLAN NOTE
on maintenance HD MWF @[facility]   Access: LUE AVF  EDW: 94.5 kg  Recently required brief CRRT during hospitalization at [place] at time of cardiac cath w/stenting  Status post intermittent HD on 3/21 with 3000 mL of fluid removal..  Bed scale weight today a.m. was 99.9 kg indicating patient is still above target weight.  Plan for dialysis again today for more fluid removal.  Would recommend checking standing weight if possible.  Continue midodrine 10 mg before dialysis

## 2025-03-22 NOTE — ASSESSMENT & PLAN NOTE
Wt Readings from Last 3 Encounters:   03/22/25 99.9 kg (220 lb 3.8 oz)   03/19/25 98.4 kg (217 lb)   02/19/25 96.2 kg (212 lb)     echo 3/1/2025:  EF 35-40% with G2 DD, moderate to severe AS, mild to moderate MR, mild to moderate TR  on beta blockers and Entresto  Appears clinically fluid overloaded and is above target weight, continue UF with HD, extra HD today.  Hold Entresto in the setting of hypotension  Patient was seen by cardiology during hospital admission for complaint of chest pain was told to continue metoprolol and Entresto and could add Ranexa as per cardiology note

## 2025-03-22 NOTE — PHYSICAL THERAPY NOTE
PHYSICAL THERAPY EVALUATION  DATE: 03/22/25  TIME: 0345-2568    NAME:  Asad Galloway  AGE:   65 y.o.  Mrn:   [id number]  Length Of Stay: 1    ADMIT DX:  Hypotension [I95.9]  Aggressive behavior [R46.89]  Stroke (HCC) [I63.9]  Elevated troponin [R79.89]  ESRD on dialysis (HCC) [N18.6, Z99.2]  Nonsustained ventricular tachycardia (HCC) [I47.29]  Hypothermia, initial encounter [T68.XXXA]    Past Medical History:   Diagnosis Date    Cerebrovascular accident (CVA) due to thrombosis of left middle cerebral artery (HCC) 07/29/2018    Chronic kidney disease     Coronary artery disease     Diabetes mellitus (HCC)     not on meds    Dialysis patient (Formerly [facility])     M-W-F    Fistula     left upper arm for hemodialyis    GERD (gastroesophageal reflux disease)     History of coronary artery stent placement     x3    Hypercholesteremia     Hyperlipidemia     Hypertension     Infectious viral hepatitis     B as child    Limb alert care status     no BP/IV left arm    Neuropathy     Nonhealing ulcer of heel (HCC) 04/25/2023    Obesity     Osteomyelitis (Formerly [facility])     last assessed 11/4/16-per son not currently    Penetrating foot wound, left, initial encounter 01/19/2022    PVC's (premature ventricular contractions)     sees  Cardio    Stroke (Formerly [facility])     last weeof July 2018 [facility]    TIA (transient ischemic attack) 10/28/2018    Ulcer of left heel, limited to breakdown of skin (HCC) 06/13/2024    Wears dentures     Wears glasses      Past Surgical History:   Procedure Laterality Date    ABDOMINAL SURGERY      CARDIAC CATHETERIZATION N/A 05/02/2022    Procedure: Cardiac Coronary Angiogram;  Surgeon: Sam Davis MD;  [facility]: [facility];  Service: Cardiology    CARDIAC CATHETERIZATION N/A 05/02/2022    Procedure: Cardiac pci;  Surgeon: Sam Davis MD;  [facility];  Service: Cardiology    CARDIAC CATHETERIZATION  02/01/2023    Procedure: Cardiac catheterization;  Surgeon: Sam Davis MD;   "Location: [facility];  Service: Cardiology    CARDIAC CATHETERIZATION N/A 02/01/2023    Procedure: Cardiac pci;  Surgeon: Sam Davis MD;  [facility]: [facility];  Service: Cardiology    CARDIAC CATHETERIZATION N/A 02/01/2023    Procedure: Cardiac Coronary Angiogram;  Surgeon: Sam Davis MD;  [facility]: [facility];  Service: Cardiology    CARDIAC CATHETERIZATION N/A 02/01/2023    Procedure: Cardiac other-IVUS;  Surgeon: Sam Davis MD;  [facility]: [facility];  Service: Cardiology    CHOLECYSTECTOMY      Percutaneous    COLONOSCOPY      CORONARY ANGIOPLASTY WITH STENT PLACEMENT      CYSTOSCOPY      HEMODIALYSIS ADULT  1/22/2024    HEMODIALYSIS ADULT  01/24/2024    IR LOWER EXTREMITY ANGIOGRAM  9/29/2023    IR LOWER EXTREMITY ANGIOGRAM  02/26/2024    OTHER SURGICAL HISTORY      \"stimulator to control bowel movements\"    OR ESOPHAGOGASTRODUODENOSCOPY TRANSORAL DIAGNOSTIC N/A 09/27/2016    Procedure: ESOPHAGOGASTRODUODENOSCOPY (EGD);  Surgeon: Adele Rowe MD;  Location: [facility] GI LAB;  Service: Gastroenterology    OR LAPAROSCOPY SURG CHOLECYSTECTOMY N/A 02/29/2016    Procedure: LAPAROSCOPIC CHOLECYSTECTOMY ;  Surgeon: Cliff Roman DO;  Location: [facility];  Service: General    OR SLCTV CATHJ 3RD+ ORD SLCTV ABDL PEL/LXTR BRNCH Left 9/29/2023    Procedure: Left leg angiogram with intervention;  Surgeon: Michelle Galvan MD;  Location: [facility] MA[facility];  Service: Vascular    OR SLCTV CATHJ 3RD+ ORD SLCTV ABDL PEL/LXTR BRNCH Left 02/26/2024    Procedure: Left leg angiogram antegrade access, left popliteal angioplasty;  Surgeon: Michelle Galvan MD;  Location: [facility];  Service: Vascular    ROTATOR CUFF REPAIR Right     SPINAL CORD STIMULATOR IMPLANT      \"Medtronic interstim model # 3058- in lower back to control bowel movements-currently turned off-battery is dead\"    TOE AMPUTATION Right 10/28/2016    Procedure: 3RD TOE AMPUTATION ;  Surgeon: Anjel Salas DPM;  Location: " [facility];  Service:        Performed at least 2 patient identifiers during session: Name, ID bracelet, and Epic photo     03/22/25 1412   PT Last Visit   PT Visit Date 03/22/25   Note Type   Note type Evaluation   Pain Assessment   Pain Assessment Tool 0-10   Pain Score No Pain   Restrictions/Precautions   Weight Bearing Precautions Per Order No   Other Precautions Cognitive;Impulsive;Combative;Chair Alarm;Bed Alarm;Fall Risk   Home Living   Type of Home House   Home Layout Multi-level;Performs ADLs on one level;Able to live on main level with bedroom/bathroom  (0 RAFAT through basement level, able to maintain single level living on that level with bedroom/bathroom/kitchen; also has full bed/bath/kitchen on 2nd level)   Bathroom Shower/Tub Walk-in shower   Bathroom Toilet Raised   Bathroom Equipment Grab bars in shower;Shower chair;Grab bars around toilet   Bathroom Accessibility Accessible   Home Equipment Walker;Cane   Prior Function   Level of [place] Independent with functional mobility;Needs assistance with ADLs;Needs assistance with IADLS   Lives With Spouse;Son  (family reports that at least 1 person is home with pt at all times; son works from home)   Receives Help From Family;Outpatient therapy  (h/o OPPT, unable to determine if pt still current w/)   IADLs Family/Friend/Other provides transportation;Family/Friend/Other provides meals;Family/Friend/Other provides medication management   Falls in the last 6 months 1 to 4   Vocational Retired   Comments Pt is a very poor historian at time of evaluation, pt highly agitated and emotional. Pt's spouse present to confirm home setup and PLOF. At baseline: Pt lives at home with family, always has supervision/assist of at least 1 person. Pt has assistance for all aspects of ADLs and IADLs, mobilizes with RW at S level. Spouse provides transportation to/from HD 3x/wk and reports that car transfers have become increasingly difficult. Spouse reports significant  "difficulty caring for pt at home at current functional and emotional status. Per EMR: pt currently coming from [facility] for STR. Unable to obtain pt's functional status at SNF STR.   General   Additional Pertinent History Pt is a 65 yr old male admitted 3/21/25 with AMS and agitation, possible fall, hypothermic and hypotensive. CT head revealed possible hypodensity of R julien consistent with CVA however possible artifact, MRI pending. PMH includes ESRD on HD MWF, CVA, CAD s/p multiple stents, DM, HTN, neuropathy, obesity, spinal cord stimulator, R toe amputation, mood disorder.   Family/Caregiver Present Yes  (spouse present t/o session)   Cognition   Overall Cognitive Status Impaired   Arousal/Participation Alert  (initially cooperative, however quickly becomes uncooperative)   Orientation Level Oriented to person;Oriented to place;Disoriented to situation   Memory Decreased short term memory;Decreased recall of recent events;Decreased recall of precautions   Following Commands Follows one step commands with increased time or repetition   Subjective   Subjective \"I want to fucking be dead\"   RUE Assessment   RUE Assessment WFL   LUE Assessment   LUE Assessment WFL   RLE Assessment   RLE Assessment WFL   LLE Assessment   LLE Assessment WFL   Coordination   Movements are Fluid and Coordinated 1   Sensation WFL   Light Touch   RLE Light Touch Grossly intact   LLE Light Touch Grossly intact   Proprioception   RLE Proprioception Grossly intact   LLE Proprioception Grossly Intact   Bed Mobility   Additional Comments Bed mobility NT on this date as pt presents and was left seated at EOB. Pt initially with RN present, and at end of session was left with MD present, bed alarm engaged.   Transfers   Sit to Stand 4  Minimal assistance   Additional items Assist x 1;Increased time required;Verbal cues  (RW)   Stand to Sit 3  Moderate assistance   Additional items Assist x 1;Impulsive;Verbal cues  (RW)   Stand " "pivot 4  Minimal assistance   Additional items Assist x 1;Increased time required;Verbal cues  (RW)   Additional Comments Cues for hand placement for optimal mechanics and safety, pt with poor application d/t lack of desire. Pt impulsive for return to sit EOB. Pt requiring increased time and effort to achieve full stand at RW.   Ambulation/Elevation   Gait pattern Forward Flexion;Wide JOSE DAVID;Decreased foot clearance;Short stride;Decreased heel strike   Gait Assistance 4  Minimal assist   Additional items Assist x 1;Verbal cues;Tactile cues   Assistive Device Rolling walker   Distance 30ft x1  (pt declines further)   Stair Management Assistance Not tested  (per family, pt does not have to negotiate stairs at home)   Balance   Static Sitting Good   Dynamic Sitting Fair   Static Standing Poor +  (w/ RW)   Dynamic Standing Poor +  (w/ RW)   Ambulatory Poor +  (w/ RW)   Endurance Deficit   Endurance Deficit Yes   Activity Tolerance   Activity Tolerance Treatment limited secondary to agitation  (pt with highly variable emotional status and agitation t/o session)   Medical Staff Made Aware Spoke with RN, PCA, SLIM MD   Assessment   Prognosis Fair   Problem List Decreased endurance;Impaired balance;Decreased mobility;Decreased cognition;Impaired judgement;Decreased safety awareness;Decreased skin integrity;Obesity   Assessment Pt seen for PT evaluation for mobility assessment & discharge needs. Pt admitted 3/21/2025 w/ agitation, fall, hypothermia and hypotension, dx Acute encephalopathy. During PT IE, pt requires MIN-MODA for STS transfers with RW, and TEVIN for ambulation of 30ft with RW. Pt with highly variable emotional status vs agitation t/o session, frequently repeating that he wants to \"go home\" or \"to fucking die\". SLIM MD aware. Pt displays above outlined functional impairments & limitations, and presents below his baseline level of functional mobility. The AM-PAC & Barthel Index outcome tools were used to assist in " "determining pt safety w/ mobility/self care & appropriate d/c recommendations, see above for scores. Pt is at risk of falls d/t multiple comorbidities, impulsivity, h/o falls, impaired balance, impaired cognition, impaired insight/safety awareness, use of ambulatory aid, acuity of medical illness, ongoing medical treatment of primary dx, abnormal lab values, and polypharmacy. Pt's clinical presentation is currently unstable/unpredictable as seen in pt's presentation of varying levels of cognitive performance, increased fall risk, new onset of impairment of functional mobility, decreased endurance, and new onset of weakness. Pt will benefit from continued PT services in order to address impairments, decrease risk of falls, maximize independence w/ fnxl mobility, & ensure safety w/ mobility for transition to next level of care. Based on pt presentation & impairments, pt would most appropriately benefit from Level II (moderate PT intensity) resources upon d/c.   Goals   Patient Goals \"to go home\"   [facility] Expiration Date 04/05/25   Short Term Goal #1 Pt will: complete all bed mobility in flat bed at JACKY level in order to promote increased OOB functional mobility and simulate home environment; complete all transfers with RW at JACKY level in order to increase safety with functional mobility; ambulate >100ft with RW and S in order to increase safety with household distance functional mobility; demonstrate understanding and independence with LE strengthening HEP; improve ambulatory balance to >/= good grade with RW in order to promote safety and increased independence with mobility; tolerate >3hrs OOB in upright position, in order to improve muscular endurance and respiratory status; improve AM-PAC score to >/= 20/24 in order to increase independence with mobility and decrease burden of care; improve Barthel Index score to >/= 50/100 in order to increase independence and decrease risk of falls.   PT Treatment Day 0   Plan "   Treatment/Interventions Functional transfer training;LE strengthening/ROM;Therapeutic exercise;Endurance training;Cognitive reorientation;Patient/family training;Equipment eval/education;Bed mobility;Gait training;Spoke to nursing;Spoke to MD   PT Frequency 3-5x/wk   Discharge Recommendation   Rehab Resource Intensity Level, PT II (Moderate Resource Intensity)   AM-PAC Basic Mobility Inpatient   Turning in Flat Bed Without Bedrails 3   Lying on Back to Sitting on Edge of Flat Bed Without Bedrails 2   Moving Bed to Chair 3   Standing Up From Chair Using Arms 3   Walk in Room 3   Climb 3-5 Stairs With Railing 1   Basic Mobility Inpatient Raw Score 15   Basic Mobility Standardized Score 36.97   [facility] Highest Level Of Mobility   -HL Goal 4: Move to chair/commode   JH-HLM Achieved 7: Walk 25 feet or more   Modified [place] Scale   Modified [place] Scale 4   Barthel Index   Feeding 10   Bathing 0   Grooming Score 0   Dressing Score 5   Bladder Score 5   Bowels Score 10   Toilet Use Score 5   Transfers (Bed/Chair) Score 5   Mobility (Level Surface) Score 0   Stairs Score 0   Barthel Index Score 40   End of Consult   Patient Position at End of Consult Seated edge of bed;Bed/Chair alarm activated;All needs within reach  (MD Yadira Oliveros and JIMMY RN in room, THIERNO Rebolledo aware of pt status)     Based on patient's [facility] Highest Level of Mobility scores today, patient currently has a goal of -[facility] Levels: 7: WALK 25 FEET OR MORE, to be completed with RN staffing each shift, in order to improve overall activity tolerance and mobility, combat hospital related deconditioning, and maximize outcomes for d/c from the acute care setting.     The patient's AM-PAC Basic Mobility Inpatient Short Form Raw Score is 15. A Raw score of less than or equal to 16 suggests the patient may benefit from discharge to post-acute rehabilitation services. Please also refer to the recommendation of the Physical Therapist for safe discharge  planning.      Tiffany Ortiz, PT, DPT   Available via [employer]  NPI # [id number]  [place] License - [id number]  3/22/2025

## 2025-03-22 NOTE — PROGRESS NOTES
Asad Galloway is a 65 y.o. male who is currently ordered Vancomycin IV with management by the Pharmacy Consult service.  Relevant clinical data and objective / subjective history reviewed.  Vancomycin Assessment:  Indication and Goal AUC/Trough: Bacteremia (goal -600, trough >10)  Clinical Status: stable  Micro:     Renal Function:  SCr: 4.78 mg/dL  CrCl: 18 mL/min  Renal replacement: HD -- M/W/F  Days of Therapy: 2  Current Dose: 10mg/kg (750mg) IV after each HD session  Vancomycin Plan:  New Dosing: continue 10mg/kg (750mg) IV after each HD session  Next Level: pre-HD level on 3/26 at 600  Renal Function Monitoring: Daily BMP and UOP  Pharmacy will continue to follow closely for s/sx of nephrotoxicity, infusion reactions and appropriateness of therapy.  BMP and CBC will be ordered per protocol. We will continue to follow the patient’s culture results and clinical progress daily.    Susu Calvin, Pharmacist

## 2025-03-22 NOTE — ASSESSMENT & PLAN NOTE
Calcium and magnesium stable  Hx hyperphosphatemia.  Binders recently discontinued during hospitalization at [facility] and continuing to monitor as outpatient.  Continue low phosphorus diet  Secondary hyperparathyroidism of renal origin: Sensipar recently discontinued.  Maintained on Hectorol 2 mcg 3 times a week with HD  Renal diet . Continue to monitor and manage as outpatient

## 2025-03-22 NOTE — PLAN OF CARE
"Post-Dialysis RN Treatment Note    Blood Pressure:  Pre 98/73 mm/Hg  Post 136/59 mmHg   EDW:  94.5 kg    Weight:  Pre 100.7 kg   Post 99.7 kg   Mode of weight measurement: Standing Scale   Volume Removed:  938 ml    Treatment duration: 90 minutes    NS given:  No    Treatment shortened Yes, describe: 1.5 instead of 3 hours because of patient being aggressive and noncompliant. From laying on bed, suddenly sat at the side of the bed 3x, then complained feeling sick. BP dropped to 88/23. When asked to lay flat by his wife, son and this HD RN, he started cursing and yelling to everyone. Then shouted at this HD RN \"F**k you! I do not care what you do! I do not wanna talk to you!\" several times. Prior HD, primary provider wanted to give Ativan but was suggested by the patient's son it's contraindicated against patient's heart condition.     Medications given during Rx: Midodrine 10mg PO   Estimated Kt/V:  Not Applicable   Access type: AV fistula   Needle Gauge:  15   Access Issues: No    Report called to primary nurse:   Yes, via ESC to DD RN      Started patient on hemodialysis tx with UF goal of 2-2.5L net as tolerated per HD order x 3 hours x 4K bath for serum k+ of 3.9 today 3/22/25.    Problem: METABOLIC, FLUID AND ELECTROLYTES - ADULT  Goal: Electrolytes maintained within normal limits  Description: INTERVENTIONS:- Monitor labs and assess patient for signs and symptoms of electrolyte imbalances- Administer electrolyte replacement as ordered- Monitor response to electrolyte replacements, including repeat lab results as appropriate- Instruct patient on fluid and nutrition as appropriate  Outcome: Progressing  Goal: Fluid balance maintained  Description: INTERVENTIONS:- Monitor labs - Monitor I/O and WT- Instruct patient on fluid and nutrition as appropriate- Assess for signs & symptoms of volume excess or deficit  Outcome: Progressing     "

## 2025-03-22 NOTE — ASSESSMENT & PLAN NOTE
Patient presents from [facility] after he was found covered in feces this morning and throwing things at staff  On arrival to ED patient mets SIRS criteria for hypothermia and hypotension  Trop 246->216->235, WBC 4.48, Lactic 2.0, Procal 0.24, TSH 6.133, Viral Resp panel negative  CTH: New interval hypodensity in R julien, possibly artifactual  On exam patient oriented to person and place, intermittently agitated particularly when speaking about [facility], otherwise no focal neurologic deficits  Extremity agitated in the afternoon. Threatened to leave     Plan:  MRI Brain pending   Follow-up on blood culture  Delirium precautions  Continue ceftriaxone and Vancomycin  Per wife melatonin helps calm him down.All psych medication recently held. Family requesting we not give ativan or antipsychotics   Will undergo dialysis today  Once dose of tylenol for pain

## 2025-03-22 NOTE — ASSESSMENT & PLAN NOTE
Wt Readings from Last 3 Encounters:   03/22/25 101 kg (222 lb 0.1 oz)   03/19/25 98.4 kg (217 lb)   02/19/25 96.2 kg (212 lb)     Echo 3/1/25 EF 35-40%, moderate systolic dysfunction, G3DD, global hypokinesis, severely dilated LA, moderate to severe AS and TR.  Patient recently underwent PCI on 3/6/25 at [place] with restenting of LAD, discharged on 3/14 with plans to follow-up for assessment for potential TAVR  His hospitalization was complicated by hypotension that required ICU level of care and pressor support    Home med: Metoprolol succinate 25 mg qHS, Entresto 24-26 mg qHS, ASA 81 mg, Prasugrel 10 mg QD, Atorvastatin 40 mg QD    Plan:  Continue home regimen  Hold presugrel in the setting of possible stroke  Possible cardiolgy consult

## 2025-03-23 ENCOUNTER — APPOINTMENT (INPATIENT)
Dept: MRI IMAGING | Facility: HOSPITAL | Age: 65
DRG: 314 | End: 2025-03-23
Payer: MEDICARE

## 2025-03-23 LAB
ALBUMIN SERPL BCG-MCNC: 3.7 G/DL (ref 3.5–5)
ALP SERPL-CCNC: 116 U/L (ref 34–104)
ALT SERPL W P-5'-P-CCNC: 8 U/L (ref 7–52)
ANION GAP SERPL CALCULATED.3IONS-SCNC: 10 MMOL/L (ref 4–13)
AST SERPL W P-5'-P-CCNC: 6 U/L (ref 13–39)
BASOPHILS # BLD AUTO: 0.03 THOUSANDS/ÂΜL (ref 0–0.1)
BASOPHILS NFR BLD AUTO: 1 % (ref 0–1)
BILIRUB SERPL-MCNC: 0.62 MG/DL (ref 0.2–1)
BUN SERPL-MCNC: 29 MG/DL (ref 5–25)
CALCIUM SERPL-MCNC: 8.5 MG/DL (ref 8.4–10.2)
CHLORIDE SERPL-SCNC: 95 MMOL/L (ref 96–108)
CO2 SERPL-SCNC: 26 MMOL/L (ref 21–32)
CREAT SERPL-MCNC: 4.99 MG/DL (ref 0.6–1.3)
EOSINOPHIL # BLD AUTO: 0.08 THOUSAND/ÂΜL (ref 0–0.61)
EOSINOPHIL NFR BLD AUTO: 2 % (ref 0–6)
ERYTHROCYTE [DISTWIDTH] IN BLOOD BY AUTOMATED COUNT: 17.6 % (ref 11.6–15.1)
GFR SERPL CREATININE-BSD FRML MDRD: 11 ML/MIN/1.73SQ M
GLUCOSE SERPL-MCNC: 85 MG/DL (ref 65–140)
HCT VFR BLD AUTO: 32.2 % (ref 36.5–49.3)
HGB BLD-MCNC: 10.2 G/DL (ref 12–17)
IMM GRANULOCYTES # BLD AUTO: 0.03 THOUSAND/UL (ref 0–0.2)
IMM GRANULOCYTES NFR BLD AUTO: 1 % (ref 0–2)
LYMPHOCYTES # BLD AUTO: 0.86 THOUSANDS/ÂΜL (ref 0.6–4.47)
LYMPHOCYTES NFR BLD AUTO: 18 % (ref 14–44)
MCH RBC QN AUTO: 31.2 PG (ref 26.8–34.3)
MCHC RBC AUTO-ENTMCNC: 31.7 G/DL (ref 31.4–37.4)
MCV RBC AUTO: 99 FL (ref 82–98)
MONOCYTES # BLD AUTO: 0.52 THOUSAND/ÂΜL (ref 0.17–1.22)
MONOCYTES NFR BLD AUTO: 11 % (ref 4–12)
NEUTROPHILS # BLD AUTO: 3.3 THOUSANDS/ÂΜL (ref 1.85–7.62)
NEUTS SEG NFR BLD AUTO: 67 % (ref 43–75)
NRBC BLD AUTO-RTO: 0 /100 WBCS
PLATELET # BLD AUTO: 95 THOUSANDS/UL (ref 149–390)
PMV BLD AUTO: 11.7 FL (ref 8.9–12.7)
POTASSIUM SERPL-SCNC: 4.5 MMOL/L (ref 3.5–5.3)
PROT SERPL-MCNC: 6.5 G/DL (ref 6.4–8.4)
RBC # BLD AUTO: 3.27 MILLION/UL (ref 3.88–5.62)
SODIUM SERPL-SCNC: 131 MMOL/L (ref 135–147)
WBC # BLD AUTO: 4.82 THOUSAND/UL (ref 4.31–10.16)

## 2025-03-23 PROCEDURE — 80053 COMPREHEN METABOLIC PANEL: CPT

## 2025-03-23 PROCEDURE — 70551 MRI BRAIN STEM W/O DYE: CPT

## 2025-03-23 PROCEDURE — 99232 SBSQ HOSP IP/OBS MODERATE 35: CPT | Performed by: INTERNAL MEDICINE

## 2025-03-23 PROCEDURE — 85025 COMPLETE CBC W/AUTO DIFF WBC: CPT

## 2025-03-23 RX ORDER — METOCLOPRAMIDE HYDROCHLORIDE 5 MG/ML
10 INJECTION INTRAMUSCULAR; INTRAVENOUS ONCE
Status: DISCONTINUED | OUTPATIENT
Start: 2025-03-23 | End: 2025-03-23

## 2025-03-23 RX ORDER — MIDODRINE HYDROCHLORIDE 5 MG/1
5 TABLET ORAL
Status: DISCONTINUED | OUTPATIENT
Start: 2025-03-23 | End: 2025-03-24 | Stop reason: HOSPADM

## 2025-03-23 RX ORDER — METOCLOPRAMIDE HYDROCHLORIDE 5 MG/ML
10 INJECTION INTRAMUSCULAR; INTRAVENOUS EVERY 6 HOURS PRN
Status: DISCONTINUED | OUTPATIENT
Start: 2025-03-23 | End: 2025-03-24 | Stop reason: HOSPADM

## 2025-03-23 RX ORDER — MUPIROCIN 20 MG/G
OINTMENT TOPICAL 3 TIMES DAILY
Status: DISCONTINUED | OUTPATIENT
Start: 2025-03-23 | End: 2025-03-24 | Stop reason: HOSPADM

## 2025-03-23 RX ADMIN — ASPIRIN 81 MG CHEWABLE TABLET 81 MG: 81 TABLET CHEWABLE at 08:29

## 2025-03-23 RX ADMIN — HEPARIN SODIUM 5000 UNITS: 5000 INJECTION INTRAVENOUS; SUBCUTANEOUS at 22:32

## 2025-03-23 RX ADMIN — MUPIROCIN: 20 OINTMENT TOPICAL at 16:24

## 2025-03-23 RX ADMIN — CEFAZOLIN SODIUM 1000 MG: 1 SOLUTION INTRAVENOUS at 12:56

## 2025-03-23 RX ADMIN — MIDODRINE HYDROCHLORIDE 5 MG: 5 TABLET ORAL at 16:23

## 2025-03-23 RX ADMIN — METOCLOPRAMIDE 10 MG: 5 INJECTION, SOLUTION INTRAMUSCULAR; INTRAVENOUS at 22:32

## 2025-03-23 RX ADMIN — MUPIROCIN 1 APPLICATION: 20 OINTMENT TOPICAL at 22:32

## 2025-03-23 RX ADMIN — CEFAZOLIN SODIUM 1000 MG: 1 SOLUTION INTRAVENOUS at 02:13

## 2025-03-23 RX ADMIN — HEPARIN SODIUM 5000 UNITS: 5000 INJECTION INTRAVENOUS; SUBCUTANEOUS at 05:07

## 2025-03-23 RX ADMIN — ATORVASTATIN CALCIUM 40 MG: 40 TABLET, FILM COATED ORAL at 16:23

## 2025-03-23 RX ADMIN — Medication 6 MG: at 22:32

## 2025-03-23 RX ADMIN — HEPARIN SODIUM 5000 UNITS: 5000 INJECTION INTRAVENOUS; SUBCUTANEOUS at 12:53

## 2025-03-23 RX ADMIN — MIDODRINE HYDROCHLORIDE 5 MG: 5 TABLET ORAL at 11:07

## 2025-03-23 RX ADMIN — MUPIROCIN: 20 OINTMENT TOPICAL at 12:54

## 2025-03-23 RX ADMIN — METOCLOPRAMIDE 10 MG: 5 INJECTION, SOLUTION INTRAMUSCULAR; INTRAVENOUS at 01:03

## 2025-03-23 RX ADMIN — PRASUGREL 10 MG: 10 TABLET, FILM COATED ORAL at 08:30

## 2025-03-23 NOTE — ASSESSMENT & PLAN NOTE
on maintenance HD [facility]   Access: LUE AVF  EDW: 94.5 kg  Recently required brief CRRT during hospitalization at [facility] at time of cardiac cath w/stenting  Status post intermittent HD on 3/21 with 3000 mL of fluid removal.  Extra hemodialysis treatment on 3/22 for more fluid removal but was only able to remove 1000 mL as patient became aggressive during the dialysis treatment and received only 1 and half hours of treatment, also blood pressure dropped to 80s.  Appears clinically euvolemic.  Still significantly above target weight.  Next dialysis treatment would be on Monday per schedule either inpatient or outpatient

## 2025-03-23 NOTE — PROGRESS NOTES
Progress Note - Hospitalist   Name: Asad Galloway 65 y.o. male I MRN: [id number]  Unit/Bed#: S -01 I Date of Admission: 3/21/2025   Date of Service: 3/23/2025 I Hospital Day: 2    Assessment & Plan  Acute encephalopathy  Patient presents from [facility] after he was found covered in feces this morning and throwing things at staff  On arrival to ED patient mets SIRS criteria for hypothermia and hypotension  Trop 246->216->235, WBC 4.48, Lactic 2.0, Procal 0.24, TSH 6.133, Viral Resp panel negative  CTH: New interval hypodensity in R julien, possibly artifactual  On exam patient oriented to person and place, intermittently agitated particularly when speaking about [facility], otherwise no focal neurologic deficits  Extremity agitated in the afternoon. Threatened to leave     Plan:  MRI Brain pending   Follow-up on blood culture  Delirium precautions  Discontinue ceftriaxone and Vancomycin  Start Cefazolin  Per wife melatonin helps calm him down.All psych medication recently held. Family requesting we not give ativan or antipsychotics   Once MRI completed, if negative can Discharge    SIRS (systemic inflammatory response syndrome) ([facility])  Patient met SIRS criteria on admission for hypothermia and hypotension, was breifly placed on vaso gtt and given 500 mL bolus with resolution of hypotension  Patient also received cefepime and vancomycin   Patient's son notes that he frequently has problems with hypotension, and that is baseline BP is low  Patient did note 4 episodes of diarrhea overnight.  Source of infection is left pinky with concerning wounds.     Plan:  Discontinued ceftriaxone and Vancomycin   Start cefazolin   Wound care consult  Start mupirocin for left pinky finger  HFrEF (heart failure with reduced ejection fraction) ([facility])  Wt Readings from Last 3 Encounters:   03/22/25 99.7 kg (219 lb 12.8 oz)   03/19/25 98.4 kg (217 lb)   02/19/25 96.2 kg (212 lb)     Echo 3/1/25 EF 35-40%, moderate systolic dysfunction,  G3DD, global hypokinesis, severely dilated LA, moderate to severe AS and TR.  Patient recently underwent PCI on 3/6/25 at [place] with restenting of LAD, discharged on 3/14 with plans to follow-up for assessment for potential TAVR  His hospitalization was complicated by hypotension that required ICU level of care and pressor support    Home med: Metoprolol succinate 25 mg qHS, Entresto 24-26 mg qHS, ASA 81 mg, Prasugrel 10 mg QD, Atorvastatin 40 mg QD    Plan:  Continue home regimen  Hold presugrel in the setting of possible stroke  Possible cardiolgy consult  ESRD on dialysis ([facility])  Lab Results   Component Value Date    EGFR 11 03/22/2025    EGFR 8 03/21/2025    EGFR 7 03/17/2025    CREATININE 4.78 (H) 03/22/2025    CREATININE 6.02 (H) 03/21/2025    CREATININE 7.33 (H) 03/17/2025   Patient on MWF HD  Patient was previously on sevelamer and cinacalcet but these were discontinued during recent admission in [place]   Patient does take midodrine 5 mg prior to dialysis    Plan:  Consult nephrology to set up HD  Midodrine 5 mg prior to HD  Primary hypertension  Home med: Metoprolol succinate 25 mg qHS, Entresto 24-26 mg qHS  Mood disorder (HCC)  Per patient's son, was previously on VPA but this medication was discontinued during his recent admission in [place] d/t concerns for thrombocytopenia  Type 2 diabetes mellitus ([facility])  Lab Results   Component Value Date    HGBA1C 5.5 11/07/2024       Recent Labs     03/21/25  0441   POCGLU 116       Blood Sugar Average: Last 72 hrs:  (P) 116    No home medications at this time.  HgbA1c 5.5    Plan:  CHO control diet    Anemia due to chronic kidney disease, on chronic dialysis ([facility])  Lab Results   Component Value Date    EGFR 11 03/22/2025    EGFR 8 03/21/2025    EGFR 7 03/17/2025    CREATININE 4.78 (H) 03/22/2025    CREATININE 6.02 (H) 03/21/2025    CREATININE 7.33 (H) 03/17/2025     Hgb 10.1 on arrival to ED, consistent with baseline of 9-10    Plan:  CBC daily  Hyponatremia    Fluid  overload  Plan HD today     VTE Pharmacologic Prophylaxis: VTE Score: 6 High Risk (Score >/= 5) - Pharmacological DVT Prophylaxis Ordered: heparin. Sequential Compression Devices Ordered.    Mobility:   Basic Mobility Inpatient Raw Score: 15  JH-HLM Goal: 4: Move to chair/commode  JH-HLM Achieved: 7: Walk 25 feet or more  JH-HLM Goal achieved. Continue to encourage appropriate mobility.    Patient Centered Rounds: I performed bedside rounds with nursing staff today.   Discussions with Specialists or Other Care Team Provider: Attending Dr John    Education and Discussions with Family / Patient: Updated  (daughter) at bedside.    Current Length of Stay: 2 day(s)  Current Patient Status: Inpatient       Code Status: Level 1 - Full Code    Subjective   Patient seen and examined at bedside.  Overnight patient had nausea and vomiting and was given reglan.  Patient denies any acute symptoms this morning such as nausea, vomiting, chest pain , shortness of breath.    Objective :  Temp:  [97.5 °F (36.4 °C)-99 °F (37.2 °C)] 98 °F (36.7 °C)  HR:  [67-86] 80  BP: ()/(23-73) 92/51  Resp:  [15-18] 16  SpO2:  [97 %-99 %] 97 %  O2 Device: None (Room air)    Body mass index is 32.46 kg/m².     Input and Output Summary (last 24 hours):     Intake/Output Summary (Last 24 hours) at 3/23/2025 0532  Last data filed at 3/23/2025 0122  Gross per 24 hour   Intake 1870 ml   Output 1438 ml   Net 432 ml       Physical Exam  Vitals and nursing note reviewed.   Constitutional:       General: He is not in acute distress.     Appearance: He is well-developed.   HENT:      Head: Normocephalic and atraumatic.   Eyes:      Conjunctiva/sclera: Conjunctivae normal.   Cardiovascular:      Rate and Rhythm: Normal rate and regular rhythm.      Heart sounds: No murmur heard.  Pulmonary:      Effort: Pulmonary effort is normal. No respiratory distress.      Breath sounds: Normal breath sounds. No rhonchi (No rhonchi appreciated during  exam; patient did have nonproductive cough during exam).   Abdominal:      Palpations: Abdomen is soft.      Tenderness: There is no abdominal tenderness.   Musculoskeletal:         General: No swelling.      Cervical back: Neck supple.      Right lower leg: Edema present.      Left lower leg: Edema present.      Comments: Port in left upper extremity   Skin:     General: Skin is warm and dry.      Capillary Refill: Capillary refill takes less than 2 seconds.      Findings: Bruising (Right side neck) present.   Neurological:      Mental Status: He is alert.   Psychiatric:         Mood and Affect: Mood normal.           Lines/Drains:        Telemetry:  Telemetry Orders (From admission, onward)               24 Hour Telemetry Monitoring  Continuous x 24 Hours (Telem)        Comments: Had Nonsustained v. Tachy; elevated troponin; hypotension   Expiring   Question:  Reason for 24 Hour Telemetry  Answer:  Arrhythmias requiring acute medical intervention / PPM or ICD malfunction                     Telemetry Reviewed: NSVT  Indication for Continued Telemetry Use: Arrthymias requiring medical therapy               Lab Results: I have reviewed the following results:   Results from last 7 days   Lab Units 03/22/25  0636   WBC Thousand/uL 4.46   HEMOGLOBIN g/dL 9.9*   HEMATOCRIT % 30.8*   PLATELETS Thousands/uL 94*   SEGS PCT % 70   LYMPHO PCT % 15   MONO PCT % 11   EOS PCT % 2     Results from last 7 days   Lab Units 03/22/25  0636   SODIUM mmol/L 131*   POTASSIUM mmol/L 3.9   CHLORIDE mmol/L 96   CO2 mmol/L 27   BUN mg/dL 28*   CREATININE mg/dL 4.78*   ANION GAP mmol/L 8   CALCIUM mg/dL 8.2*   ALBUMIN g/dL 3.7   TOTAL BILIRUBIN mg/dL 0.60   ALK PHOS U/L 124*   ALT U/L 8   AST U/L 5*   GLUCOSE RANDOM mg/dL 137     Results from last 7 days   Lab Units 03/21/25  0455   INR  1.05     Results from last 7 days   Lab Units 03/21/25  0441   POC GLUCOSE mg/dl 116         Results from last 7 days   Lab Units 03/21/25  0455   LACTIC  ACID mmol/L 2.0   PROCALCITONIN ng/ml 0.24       Recent Cultures (last 7 days):   Results from last 7 days   Lab Units 03/21/25  0450   BLOOD CULTURE  No Growth at 24 hrs.  No Growth at 24 hrs.             Last 24 Hours Medication List:     Current Facility-Administered Medications:     acetaminophen (Ofirmev) injection 1,000 mg, Once    aspirin chewable tablet 81 mg, Daily    atorvastatin (LIPITOR) tablet 40 mg, Daily With Dinner    ceFAZolin (ANCEF) IVPB (premix in dextrose) 1,000 mg 50 mL, Q12H, Last Rate: 1,000 mg (03/23/25 0213)    heparin (porcine) subcutaneous injection 5,000 Units, Q8H MICH **AND** [COMPLETED] Platelet count, Once    melatonin tablet 6 mg, HS    metoclopramide (REGLAN) injection 10 mg, Q6H PRN    metoprolol succinate (TOPROL-XL) 24 hr tablet 25 mg, Daily    midodrine (PROAMATINE) tablet 10 mg, Before Dialysis    prasugrel (EFFIENT) tablet 10 mg, Daily    [Held by provider] sacubitril-valsartan (ENTRESTO) 24-26 MG per tablet 1 tablet, Daily    Administrative Statements   Today, Patient Was Seen By: Jose Barger MD      **Please Note: This note may have been constructed using a voice recognition system.**

## 2025-03-23 NOTE — ASSESSMENT & PLAN NOTE
Wt Readings from Last 3 Encounters:   03/23/25 99.4 kg (219 lb 2.2 oz)   03/19/25 98.4 kg (217 lb)   02/19/25 96.2 kg (212 lb)     echo 3/1/2025:  EF 35-40% with G2 DD, moderate to severe AS, mild to moderate MR, mild to moderate TR  on beta blockers and Entresto  Patient above target weight, has lower extremity edema, next dialysis treatment tomorrow with more fluid removal.  Would hold off on Entresto for now due to hypotension  Patient was seen by cardiology during hospital admission for complaint of chest pain was told to continue metoprolol and Entresto and could add Ranexa as per cardiology note

## 2025-03-23 NOTE — PROGRESS NOTES
Progress Note - Nephrology   Name: Asad Galloway 65 y.o. male I MRN: [id number]  Unit/Bed#: S -01 I Date of Admission: 3/21/2025   Date of Service: 3/23/2025 I Hospital Day: 2    Assessment & Plan  ESRD on dialysis (HCC)  on maintenance HD MWF @[facility]   Access: LUE AVF  EDW: 94.5 kg  Recently required brief CRRT during hospitalization at [place] at time of cardiac cath w/stenting  Status post intermittent HD on 3/21 with 3000 mL of fluid removal.  Extra hemodialysis treatment on 3/22 for more fluid removal but was only able to remove 1000 mL as patient became aggressive during the dialysis treatment and received only 1 and half hours of treatment, also blood pressure dropped to 80s.  Appears clinically euvolemic.  Still significantly above target weight.  Next dialysis treatment would be on Monday per schedule either inpatient or outpatient    Primary hypertension  Initially BP soft, s/p  ml bolus in the ED x 2 then blood pressure improved after admission  +hx intradialytic hypotension  Home Rx: Entresto 24-26 mg daily, Toprol-XL 50 mg daily, midodrine 10 mg 3 times a week with HD  Continue midodrine with HD, continue to hold Entresto for now.     Okay to continue metoprolol with hold parameters.  Blood pressure is soft, started on midodrine 5 mg 3 times daily  Avoid hypotension    Hyponatremia  -Sodium 131 meq/L, recommend fluid restriction  Fluid overload  -Continue UF with HD as tolerated  HFrEF (heart failure with reduced ejection fraction) ([facility])  Wt Readings from Last 3 Encounters:   03/23/25 99.4 kg (219 lb 2.2 oz)   03/19/25 98.4 kg (217 lb)   02/19/25 96.2 kg (212 lb)     echo 3/1/2025:  EF 35-40% with G2 DD, moderate to severe AS, mild to moderate MR, mild to moderate TR  on beta blockers and Entresto  Patient above target weight, has lower extremity edema, next dialysis treatment tomorrow with more fluid removal.  Would hold off on Entresto for now due to hypotension  Patient was seen by  cardiology during hospital admission for complaint of chest pain was told to continue metoprolol and Entresto and could add Ranexa as per cardiology note    Anemia due to chronic kidney disease, on chronic dialysis ([facility])  Hgb improved to target range at 10.2 g/dL today goal Hgb >10.0  on Mircera 60 mcg every 2 weeks and Venofer 50 mg IV weekly as outpatient  Continue to monitor hemoglobin as inpatient    Chronic kidney disease-mineral and bone disorder  Calcium and magnesium stable  Hx hyperphosphatemia.  Binders recently discontinued during hospitalization at [place] and continuing to monitor as outpatient.  Continue low phosphorus diet  Secondary hyperparathyroidism of renal origin: Sensipar recently discontinued.  Maintained on Hectorol 2 mcg 3 times a week with HD  Renal diet . Continue to monitor and manage as outpatient    Acute encephalopathy  Depakote recently discontinued secondary to associated mild thrombocytopenia  CT with new hypodensity in the hemipons of unclear etiology.  Noted plan for MRI.  Hopefully MRI without contrast.  If plan for MRI with gadolinium patient will need dialysis immediately after MRI preferably within 3 hours and again repeat dialysis next day to remove gadolinium to decrease the risk of NSF. Noted that it is a without contrast    Type 2 diabetes mellitus (HCC)  Lab Results   Component Value Date    HGBA1C 5.5 11/07/2024   stable  continue to optimize glycemic control  management per primary team    Aortic stenosis  Scheduled for TAVR at [place] mid April  S/p recent cardiac cath with PCI      I have reviewed the nephrology recommendations including plan for next dialysis treatment tomorrow, with primary team, and we are in agreement with renal plan including the information outlined above.     Subjective   No new complaints.  No chest pain or shortness of breath.  Patient mentions he wants to go home    Objective :  Temp:  [97.5 °F (36.4 °C)-99 °F (37.2 °C)] 97.8 °F (36.6  °C)  HR:  [67-80] 80  BP: ()/(23-73) 88/40  Resp:  [15-18] 18  SpO2:  [97 %-99 %] 97 %    Current Weight: Weight - Scale: 99.4 kg (219 lb 2.2 oz)  First Weight: Weight - Scale: 99.8 kg (220 lb)  I/O         03/21 0701  03/22 0700 03/22 0701  03/23 0700 03/23 0701  03/24 0700    P.O. 480 1370 240    I.V. (mL/kg) 500 (5) 500 (5)     IV Piggyback 900      Total Intake(mL/kg) 1880 (18.8) 1870 (18.8) 240 (2.4)    Urine (mL/kg/hr)  0 (0)     Emesis/NG output  0     Other 3500 1438     Stool  0     Total Output 3500 1438     Net -1620 +432 +240           Unmeasured Stool Occurrence  3 x     Unmeasured Emesis Occurrence  1 x           Physical Exam   General:  Ill looking, awake.  Eyes: Conjunctivae pink,  Sclera anicteric  ENT: lips and mucous membranes moist  Neck: supple   Chest: Clear to Auscultation both lungs,  no crackles, ronchus or wheezing.  CVS: S1 & S2 present, normal rate, regular rhythm, no murmur.  Abdomen: soft, non-tender, non-distended, Bowel sounds normoactive  Extremities: Bilateral leg edema right more than left  Skin: no rash  Neuro: awake, alert, oriented  Psych: Mood and affect appropriate    Medications:    Current Facility-Administered Medications:     acetaminophen (Ofirmev) injection 1,000 mg, 1,000 mg, Intravenous, Once, Yadira Garcia MD    aspirin chewable tablet 81 mg, 81 mg, Oral, Daily, Jose Denny DO, 81 mg at 03/23/25 0829    atorvastatin (LIPITOR) tablet 40 mg, 40 mg, Oral, Daily With Dinner, Jose Denny DO, 40 mg at 03/22/25 1723    ceFAZolin (ANCEF) IVPB (premix in dextrose) 1,000 mg 50 mL, 1,000 mg, Intravenous, Q12H, Yadira Garcia MD, Last Rate: 100 mL/hr at 03/23/25 0213, 1,000 mg at 03/23/25 0213    heparin (porcine) subcutaneous injection 5,000 Units, 5,000 Units, Subcutaneous, Q8H MICH, 5,000 Units at 03/23/25 0507 **AND** [COMPLETED] Platelet count, , , Once, Jose Denny,     melatonin tablet 6 mg, 6 mg, Oral, HS, Yadira Garcia MD, 6 mg at 03/22/25 1001     "metoclopramide (REGLAN) injection 10 mg, 10 mg, Intravenous, Q6H PRN, Gilmer Mckeon MD, 10 mg at 03/23/25 0103    metoprolol succinate (TOPROL-XL) 24 hr tablet 25 mg, 25 mg, Oral, Daily, Jose Denny DO, 25 mg at 03/22/25 0807    midodrine (PROAMATINE) tablet 10 mg, 10 mg, Oral, Before Dialysis, Lexi Page PA-C, 10 mg at 03/22/25 1533    prasugrel (EFFIENT) tablet 10 mg, 10 mg, Oral, Daily, Jose Denny DO, 10 mg at 03/23/25 0830    [Held by provider] sacubitril-valsartan (ENTRESTO) 24-26 MG per tablet 1 tablet, 1 tablet, Oral, Daily, Yadira Garcia MD, 1 tablet at 03/21/25 1223      Lab Results: I have reviewed the following results:  Results from last 7 days   Lab Units 03/23/25  0448 03/22/25  0636 03/21/25  1439 03/21/25  0455 03/17/25  0012   WBC Thousand/uL 4.82 4.46  --  4.48 5.40   HEMOGLOBIN g/dL 10.2* 9.9*  --  10.1* 10.0*   HEMATOCRIT % 32.2* 30.8*  --  32.2* 32.3*   PLATELETS Thousands/uL 95* 94* 100* 117* 134*   POTASSIUM mmol/L 4.5 3.9  --  4.8 4.4   CHLORIDE mmol/L 95* 96  --  95* 97   CO2 mmol/L 26 27  --  27 26   BUN mg/dL 29* 28*  --  38* 41*   CREATININE mg/dL 4.99* 4.78*  --  6.02* 7.33*   CALCIUM mg/dL 8.5 8.2*  --  8.6 8.3*   MAGNESIUM mg/dL  --  1.9  --  2.0  --    PHOSPHORUS mg/dL  --  4.8*  --   --   --    ALBUMIN g/dL 3.7 3.7  --  4.0 3.7       Administrative Statements     Portions of the record may have been created with voice recognition software. Occasional wrong word or \"sound a like\" substitutions may have occurred due to the inherent limitations of voice recognition software. Read the chart carefully and recognize, using context, where substitutions have occurred.If you have any questions, please contact the dictating provider.  "

## 2025-03-23 NOTE — ASSESSMENT & PLAN NOTE
Wt Readings from Last 3 Encounters:   03/22/25 99.7 kg (219 lb 12.8 oz)   03/19/25 98.4 kg (217 lb)   02/19/25 96.2 kg (212 lb)     Echo 3/1/25 EF 35-40%, moderate systolic dysfunction, G3DD, global hypokinesis, severely dilated LA, moderate to severe AS and TR.  Patient recently underwent PCI on 3/6/25 at [place] with restenting of LAD, discharged on 3/14 with plans to follow-up for assessment for potential TAVR  His hospitalization was complicated by hypotension that required ICU level of care and pressor support    Home med: Metoprolol succinate 25 mg qHS, Entresto 24-26 mg qHS, ASA 81 mg, Prasugrel 10 mg QD, Atorvastatin 40 mg QD    Plan:  Continue home regimen  Hold presugrel in the setting of possible stroke  Possible cardiolgy consult

## 2025-03-23 NOTE — ASSESSMENT & PLAN NOTE
Hgb improved to target range at 10.2 g/dL today goal Hgb >10.0  on Mircera 60 mcg every 2 weeks and Venofer 50 mg IV weekly as outpatient  Continue to monitor hemoglobin as inpatient

## 2025-03-23 NOTE — ASSESSMENT & PLAN NOTE
Patient met SIRS criteria on admission for hypothermia and hypotension, was breifly placed on vaso gtt and given 500 mL bolus with resolution of hypotension  Patient also received cefepime and vancomycin   Patient's son notes that he frequently has problems with hypotension, and that is baseline BP is low  Patient did note 4 episodes of diarrhea overnight.  Source of infection is left pinky with concerning wounds.     Plan:  Discontinued ceftriaxone and Vancomycin   Start cefazolin   Wound care consult  Start mupirocin for left pinky finger

## 2025-03-23 NOTE — ASSESSMENT & PLAN NOTE
Patient presents from [facility] after he was found covered in feces this morning and throwing things at staff  On arrival to ED patient mets SIRS criteria for hypothermia and hypotension  Trop 246->216->235, WBC 4.48, Lactic 2.0, Procal 0.24, TSH 6.133, Viral Resp panel negative  CTH: New interval hypodensity in R julien, possibly artifactual  On exam patient oriented to person and place, intermittently agitated particularly when speaking about [facility], otherwise no focal neurologic deficits  Extremity agitated in the afternoon. Threatened to leave     Plan:  MRI Brain pending   Follow-up on blood culture  Delirium precautions  Discontinue ceftriaxone and Vancomycin  Start Cefazolin  Per wife melatonin helps calm him down.All psych medication recently held. Family requesting we not give ativan or antipsychotics   Once MRI completed, if negative can Discharge

## 2025-03-23 NOTE — ASSESSMENT & PLAN NOTE
Calcium and magnesium stable  Hx hyperphosphatemia.  Binders recently discontinued during hospitalization at [place] and continuing to monitor as outpatient.  Continue low phosphorus diet  Secondary hyperparathyroidism of renal origin: Sensipar recently discontinued.  Maintained on Hectorol 2 mcg 3 times a week with HD  Renal diet . Continue to monitor and manage as outpatient     detailed exam

## 2025-03-23 NOTE — ASSESSMENT & PLAN NOTE
Attempted to call patients daughter Carol with an update. Message left to call back.    Scheduled for TAVR at [place] mid April  S/p recent cardiac cath with PCI

## 2025-03-23 NOTE — ASSESSMENT & PLAN NOTE
Depakote recently discontinued secondary to associated mild thrombocytopenia  CT with new hypodensity in the hemipons of unclear etiology.  Noted plan for MRI.  Hopefully MRI without contrast.  If plan for MRI with gadolinium patient will need dialysis immediately after MRI preferably within 3 hours and again repeat dialysis next day to remove gadolinium to decrease the risk of NSF. Noted that it is a without contrast

## 2025-03-24 ENCOUNTER — APPOINTMENT (INPATIENT)
Dept: DIALYSIS | Facility: HOSPITAL | Age: 65
DRG: 314 | End: 2025-03-24
Payer: MEDICARE

## 2025-03-24 ENCOUNTER — APPOINTMENT (OUTPATIENT)
Facility: CLINIC | Age: 65
End: 2025-03-24
Payer: MEDICARE

## 2025-03-24 VITALS
RESPIRATION RATE: 16 BRPM | HEART RATE: 94 BPM | SYSTOLIC BLOOD PRESSURE: 151 MMHG | OXYGEN SATURATION: 97 % | DIASTOLIC BLOOD PRESSURE: 94 MMHG | WEIGHT: 214.95 LBS | BODY MASS INDEX: 31.84 KG/M2 | TEMPERATURE: 97.6 F | HEIGHT: 69 IN

## 2025-03-24 LAB
ANION GAP SERPL CALCULATED.3IONS-SCNC: 14 MMOL/L (ref 4–13)
BASOPHILS # BLD AUTO: 0.04 THOUSANDS/ÂΜL (ref 0–0.1)
BASOPHILS NFR BLD AUTO: 1 % (ref 0–1)
BUN SERPL-MCNC: 39 MG/DL (ref 5–25)
CALCIUM SERPL-MCNC: 8.6 MG/DL (ref 8.4–10.2)
CHLORIDE SERPL-SCNC: 96 MMOL/L (ref 96–108)
CO2 SERPL-SCNC: 23 MMOL/L (ref 21–32)
CREAT SERPL-MCNC: 6.44 MG/DL (ref 0.6–1.3)
EOSINOPHIL # BLD AUTO: 0.08 THOUSAND/ÂΜL (ref 0–0.61)
EOSINOPHIL NFR BLD AUTO: 2 % (ref 0–6)
ERYTHROCYTE [DISTWIDTH] IN BLOOD BY AUTOMATED COUNT: 17.4 % (ref 11.6–15.1)
GFR SERPL CREATININE-BSD FRML MDRD: 8 ML/MIN/1.73SQ M
GLUCOSE SERPL-MCNC: 79 MG/DL (ref 65–140)
HCT VFR BLD AUTO: 31.8 % (ref 36.5–49.3)
HGB BLD-MCNC: 9.7 G/DL (ref 12–17)
IMM GRANULOCYTES # BLD AUTO: 0.02 THOUSAND/UL (ref 0–0.2)
IMM GRANULOCYTES NFR BLD AUTO: 0 % (ref 0–2)
LYMPHOCYTES # BLD AUTO: 0.89 THOUSANDS/ÂΜL (ref 0.6–4.47)
LYMPHOCYTES NFR BLD AUTO: 20 % (ref 14–44)
MCH RBC QN AUTO: 30.9 PG (ref 26.8–34.3)
MCHC RBC AUTO-ENTMCNC: 30.5 G/DL (ref 31.4–37.4)
MCV RBC AUTO: 101 FL (ref 82–98)
MONOCYTES # BLD AUTO: 0.53 THOUSAND/ÂΜL (ref 0.17–1.22)
MONOCYTES NFR BLD AUTO: 12 % (ref 4–12)
NEUTROPHILS # BLD AUTO: 2.96 THOUSANDS/ÂΜL (ref 1.85–7.62)
NEUTS SEG NFR BLD AUTO: 65 % (ref 43–75)
NRBC BLD AUTO-RTO: 0 /100 WBCS
PLATELET # BLD AUTO: 89 THOUSANDS/UL (ref 149–390)
PMV BLD AUTO: 12.1 FL (ref 8.9–12.7)
POTASSIUM SERPL-SCNC: 4.7 MMOL/L (ref 3.5–5.3)
RBC # BLD AUTO: 3.14 MILLION/UL (ref 3.88–5.62)
SODIUM SERPL-SCNC: 133 MMOL/L (ref 135–147)
WBC # BLD AUTO: 4.52 THOUSAND/UL (ref 4.31–10.16)

## 2025-03-24 PROCEDURE — 85025 COMPLETE CBC W/AUTO DIFF WBC: CPT | Performed by: INTERNAL MEDICINE

## 2025-03-24 PROCEDURE — 99239 HOSP IP/OBS DSCHRG MGMT >30: CPT | Performed by: INTERNAL MEDICINE

## 2025-03-24 PROCEDURE — 99232 SBSQ HOSP IP/OBS MODERATE 35: CPT | Performed by: INTERNAL MEDICINE

## 2025-03-24 PROCEDURE — 80048 BASIC METABOLIC PNL TOTAL CA: CPT | Performed by: INTERNAL MEDICINE

## 2025-03-24 RX ORDER — CEFDINIR 300 MG/1
300 CAPSULE ORAL EVERY 12 HOURS SCHEDULED
Qty: 6 CAPSULE | Refills: 0 | Status: CANCELLED | OUTPATIENT
Start: 2025-03-25 | End: 2025-03-28

## 2025-03-24 RX ORDER — CEPHALEXIN 500 MG/1
500 CAPSULE ORAL EVERY 6 HOURS SCHEDULED
Qty: 12 CAPSULE | Refills: 0 | Status: SHIPPED | OUTPATIENT
Start: 2025-03-25 | End: 2025-03-28

## 2025-03-24 RX ADMIN — CEFAZOLIN SODIUM 1000 MG: 1 SOLUTION INTRAVENOUS at 13:24

## 2025-03-24 RX ADMIN — HEPARIN SODIUM 5000 UNITS: 5000 INJECTION INTRAVENOUS; SUBCUTANEOUS at 13:24

## 2025-03-24 RX ADMIN — MIDODRINE HYDROCHLORIDE 5 MG: 5 TABLET ORAL at 12:01

## 2025-03-24 RX ADMIN — HEPARIN SODIUM 5000 UNITS: 5000 INJECTION INTRAVENOUS; SUBCUTANEOUS at 05:21

## 2025-03-24 RX ADMIN — MIDODRINE HYDROCHLORIDE 5 MG: 5 TABLET ORAL at 05:21

## 2025-03-24 RX ADMIN — MUPIROCIN: 20 OINTMENT TOPICAL at 08:13

## 2025-03-24 RX ADMIN — MUPIROCIN: 20 OINTMENT TOPICAL at 17:45

## 2025-03-24 RX ADMIN — ASPIRIN 81 MG CHEWABLE TABLET 81 MG: 81 TABLET CHEWABLE at 08:12

## 2025-03-24 RX ADMIN — PRASUGREL 10 MG: 10 TABLET, FILM COATED ORAL at 08:13

## 2025-03-24 RX ADMIN — CEFAZOLIN SODIUM 1000 MG: 1 SOLUTION INTRAVENOUS at 01:36

## 2025-03-24 RX ADMIN — MIDODRINE HYDROCHLORIDE 10 MG: 5 TABLET ORAL at 14:10

## 2025-03-24 NOTE — PLAN OF CARE
Problem: Prexisting or High Potential for Compromised Skin Integrity  Goal: Skin integrity is maintained or improved  Description: INTERVENTIONS:- Identify patients at risk for skin breakdown- Assess and monitor skin integrity- Assess and monitor nutrition and hydration status- Monitor labs - Assess for incontinence - Turn and reposition patient- Assist with mobility/ambulation- Relieve pressure over bony prominences- Avoid friction and shearing- Provide appropriate hygiene as needed including keeping skin clean and dry- Evaluate need for skin moisturizer/barrier cream- Collaborate with interdisciplinary team - Patient/family teaching- Consider wound care consult   Outcome: Progressing     Problem: METABOLIC, FLUID AND ELECTROLYTES - ADULT  Goal: Electrolytes maintained within normal limits  Description: INTERVENTIONS:- Monitor labs and assess patient for signs and symptoms of electrolyte imbalances- Administer electrolyte replacement as ordered- Monitor response to electrolyte replacements, including repeat lab results as appropriate- Instruct patient on fluid and nutrition as appropriate  Outcome: Progressing  Goal: Fluid balance maintained  Description: INTERVENTIONS:- Monitor labs - Monitor I/O and WT- Instruct patient on fluid and nutrition as appropriate- Assess for signs & symptoms of volume excess or deficit  Outcome: Progressing

## 2025-03-24 NOTE — DISCHARGE INSTR - OTHER ORDERS
Skin care plans:  1-Hydraguard to bilateral sacrum, buttock and heels BID and PRN  2-Elevate heels to offload pressure.  3-Ehob cushion in chair when out of bed.  4-Moisturize skin daily with skin nourishing cream.  5-Turn/reposition q2h for pressure re-distribution on skin.  6-Left 5th Finger: cleanse with NSS soaked gauze. Pat dry. Cover scabs with 3M no sting barrier daily. Leave open to air   Advancement Flap (Single) Text: The defect edges were debeveled with a #15 scalpel blade.  Given the location of the defect and the proximity to free margins a single advancement flap was deemed most appropriate.  Using a sterile surgical marker, an appropriate advancement flap was drawn incorporating the defect and placing the expected incisions within the relaxed skin tension lines where possible.    The area thus outlined was incised deep to adipose tissue with a #15 scalpel blade.  The skin margins were undermined to an appropriate distance in all directions utilizing iris scissors.

## 2025-03-24 NOTE — PLAN OF CARE
Patient was cursing and yelling at his wife and this HD RN when pre-HD Midodrine was given, when cannulated and the entire time when being asked to keep the arm straight during the entire tx duration.    2 hours into the tx, patient has become more aggressive, moving his AVF arm causing tx pauses every other minute. Requested primary provider for a 3 point soft restraints.    Post-Dialysis RN Treatment Note    Blood Pressure:  Pre 99/54 mm/Hg  Post 151/94 mmHg   EDW:  94.5 kg    Weight:  Pre 101.1 kg   Post 97.5 kg   Mode of weight measurement: Standing Scale   Volume Removed:  3,500 ml    Treatment duration: 210 minutes    NS given:  No    Treatment shortened Yes, describe: 3.5 instead of 4 hours d/t patient census, Dr. Reis agreeable.   Medications given during Rx: Midodrine 10mg PO   Estimated Kt/V:  Not Applicable   Access type: AV fistula   Needle Gauge:  15   Access Issues: No    Report called to primary nurse:   Yes, via ESC to  RN      Started patient on hemodialysis treatment with UF goal of 4.5L net as tolerated per conversation with Dr. Reis x 4 hours x 2K bath for serum k+ of 4.7 today 3/24/25.    Problem: METABOLIC, FLUID AND ELECTROLYTES - ADULT  Goal: Electrolytes maintained within normal limits  Description: INTERVENTIONS:- Monitor labs and assess patient for signs and symptoms of electrolyte imbalances- Administer electrolyte replacement as ordered- Monitor response to electrolyte replacements, including repeat lab results as appropriate- Instruct patient on fluid and nutrition as appropriate  Outcome: Progressing  Goal: Fluid balance maintained  Description: INTERVENTIONS:- Monitor labs - Monitor I/O and WT- Instruct patient on fluid and nutrition as appropriate- Assess for signs & symptoms of volume excess or deficit  Outcome: Progressing

## 2025-03-24 NOTE — ASSESSMENT & PLAN NOTE
LVEF 35-40% with G2 DD, moderate to severe AS, mild to moderate MR, mild to moderate TR  UF on HD as above

## 2025-03-24 NOTE — ASSESSMENT & PLAN NOTE
Wt Readings from Last 3 Encounters:   03/24/25 105 kg (230 lb 9.6 oz)   03/19/25 98.4 kg (217 lb)   02/19/25 96.2 kg (212 lb)     Echo 3/1/25 EF 35-40%, moderate systolic dysfunction, G3DD, global hypokinesis, severely dilated LA, moderate to severe AS and TR.  Patient recently underwent PCI on 3/6/25 at [place] with restenting of LAD, discharged on 3/14 with plans to follow-up for assessment for potential TAVR  His hospitalization was complicated by hypotension that required ICU level of care and pressor support    Home med: Metoprolol succinate 25 mg qHS, Entresto 24-26 mg qHS, ASA 81 mg, Prasugrel 10 mg QD, Atorvastatin 40 mg QD    Plan:  Continue home regimen  Hold presugrel in the setting of possible stroke  Possible cardiolgy consult

## 2025-03-24 NOTE — ASSESSMENT & PLAN NOTE
MRI brain: No acute infarction, edema or mass effect, small chronic left thalamic, right frontal lobe and left posterior occipital lobe infarcts with encephalomalacia and mild gliosis  Management per primary team

## 2025-03-24 NOTE — ASSESSMENT & PLAN NOTE
Home Rx: Entresto, Toprol-XL, midodrine 10 mg 3 times a week with HD  Continue midodrine with HD  Continue metoprolol with hold parameters  Continue to hold Entresto for now

## 2025-03-24 NOTE — ASSESSMENT & PLAN NOTE
Due to lack of free water clearance in setting of ESRD  Sodium level today 133  Continue fluid restriction and attempt UF on HD

## 2025-03-24 NOTE — ASSESSMENT & PLAN NOTE
Patient presents from [facility] after he was found covered in feces this morning and throwing things at staff  On arrival to ED patient mets SIRS criteria for hypothermia and hypotension  Trop 246->216->235, WBC 4.48, Lactic 2.0, Procal 0.24, TSH 6.133, Viral Resp panel negative  CTH: New interval hypodensity in R julien, possibly artifactual  On exam patient oriented to person and place, intermittently agitated particularly when speaking about [facility], otherwise no focal neurologic deficits  Extremity agitated in the afternoon. Threatened to leave     Plan:  MRI Brain pending   Follow-up on blood culture  Delirium precautions  Discontinue ceftriaxone and Vancomycin  Continue Cefazolin while inpatient  Transition to PO Cefdinir on Discharge  Per wife melatonin helps calm him down.All psych medication recently held. Family requesting we not give ativan or antipsychotics   Once MRI completed, if negative can Discharge  MRI completed; chronic changes noted; no acute abnormalities; patient planned for discharge.

## 2025-03-24 NOTE — CASE MANAGEMENT
Case Management Discharge Planning Note    Patient name Asad Galloway  Location S /S -01 MRN [id number]  : 1960 Date 3/24/2025       Current Admission Date: 3/21/2025  Current Admission Diagnosis:Acute encephalopathy   Patient Active Problem List    Diagnosis Date Noted Date Diagnosed    Fluid overload 2025     Chronic kidney disease-mineral and bone disorder 2025     Anemia due to chronic kidney disease, on chronic dialysis ([facility]) 2025     HFrEF (heart failure with reduced ejection fraction) ([facility]) 2025     SIRS (systemic inflammatory response syndrome) ([facility]) 2025     Acute encephalopathy 2025     Peripheral arterial disease ([facility]) 2025     Type 2 diabetes mellitus ([facility]) 2024     Ambulatory dysfunction 2024     Thrombocytopenia ([facility]) 2024     Chronic deep vein thrombosis (DVT) of distal vein of right lower extremity ([facility]) 2024     Bicytopenia 2024     Obesity (BMI 30.0-34.9) 2024     QT prolongation 2024     Acute embolism and thrombosis of right peroneal vein ([facility]) 2024     Right ankle pain 2024     Pre-diabetes 2024     Hypertensive heart and chronic kidney disease with heart failure and with stage 5 chronic kidney disease, or end stage renal disease ([facility]) 2024     PAD (peripheral artery disease) ([facility]) 2024     Chronic systolic heart failure ([facility]) 2024     Edema of left lower extremity 2023     Rectal bleeding 2023     Elevated troponin 2023     Presence of external cardiac defibrillator 2023     Diabetic polyneuropathy associated with type 2 diabetes mellitus ([facility]) 2023     Absence of toe of right foot ([facility]) 2023     Hammer toes of both feet 2023     Ischemic cardiomyopathy 2023     Aortic stenosis 2023     Mood disorder ([facility]) 2023     Old myocardial infarction 2023     Hyponatremia 2023     Fall  01/06/2023     SOB (shortness of breath) 10/21/2022     Left arm swelling 10/21/2022     CAD, multiple vessel 07/14/2022     Electrolyte abnormality 05/02/2022     Chest pain 05/01/2022     Generalized weakness 04/19/2022     Primary hypertension 04/06/2022     Emotional lability 01/19/2022     History of 2019 novel coronavirus disease (COVID-19) 12/26/2020     ESRD on dialysis (HCC) 12/26/2020     Anemia 10/05/2020     S/P arteriovenous (AV) fistula creation 04/27/2020     Pre-kidney transplant, listed 04/27/2020     Urge incontinence 12/20/2019     Anxiety associated with depression 02/28/2019     History of stroke in adulthood 10/04/2018     Abnormal EEG 09/24/2018     Other constipation 08/17/2018     GERD (gastroesophageal reflux disease) 08/13/2018     Elevated alkaline phosphatase level 08/03/2018     Nephrotic range proteinuria 03/28/2018     Dizziness 02/26/2016     Mixed hyperlipidemia 02/26/2016     Antiplatelet or antithrombotic long-term use 02/26/2016       LOS (days): 3  Geometric Mean LOS (GMLOS) (days): 4.9  Days to GMLOS:1.8     OBJECTIVE:  Risk of Unplanned Readmission Score: 25.72         Current admission status: Inpatient   Preferred Pharmacy:   [id number] - [place] - [address]  [address]  [place] [address]  Phone: [phone] Fax: [phone]    Primary Care Provider: BRITTANY Allen    Primary Insurance: MEDICARE  Secondary Insurance:     DISCHARGE DETAILS:    Discharge planning discussed with:: patient's son Sam  Freedom of Choice: Yes  Comments - Freedom of Choice: CM f/u w/ pt's son Sam via phone re: PT rcmd for return to STR. Son expressed would be better for pt to return to home w/ family supports and OPPT/OT. Son declining STR for pt at this time. CM notified [facility] via secure chat of need for ambulatory PT/OT referrals for OP f/u. Son relayed pt's spouse to provide pt transport to home when ready for d/c. No further CM d/c needs  identified at this time, CM remains available.  CM contacted family/caregiver?: Yes  Were Treatment Team discharge recommendations reviewed with patient/caregiver?: Yes  Did patient/caregiver verbalize understanding of patient care needs?: Yes  Were patient/caregiver advised of the risks associated with not following Treatment Team discharge recommendations?: Yes    Contacts  Patient Contacts: Sam (son)  Relationship to Patient:: Family  Contact Method: Phone  Phone Number: [phone] (M)  Reason/Outcome: Continuity of Care, Emergency Contact, Referral, Discharge Planning    Requested Home Health Care         Is the patient interested in HHC at discharge?: No    DME Referral Provided  Referral made for DME?: No    Other Referral/Resources/Interventions Provided:  Interventions: Short Term Rehab, Outpatient OT, Outpatient PT  Referral Comments: PT rcmd STR. Son declined STR for pt and opting for return to home w/ OPPT/OT and family supports. CM notified [facility] via secure chat of same.         Treatment Team Recommendation: Short Term Rehab  Discharge Destination Plan:: Home  Transport at Discharge : Family                          IMM reviewed with patient's caregiver, patient's caregiver agrees with discharge determination.  IMM Given (Date):: 03/24/25  IMM Given to:: Family  Family notified:: Sam (son) via phone

## 2025-03-24 NOTE — DISCHARGE SUMMARY
Discharge Summary - Hospitalist   Name: Asad Galloway 65 y.o. male I MRN: [id number]  Unit/Bed#: S -01 I Date of Admission: 3/21/2025   Date of Service: 3/24/2025 I Hospital Day: 3     Assessment & Plan  Acute encephalopathy  Patient presents from [facility] after he was found covered in feces this morning and throwing things at staff  On arrival to ED patient mets SIRS criteria for hypothermia and hypotension  Trop 246->216->235, WBC 4.48, Lactic 2.0, Procal 0.24, TSH 6.133, Viral Resp panel negative  CTH: New interval hypodensity in R julien, possibly artifactual  On exam patient oriented to person and place, intermittently agitated particularly when speaking about [facility], otherwise no focal neurologic deficits  Extremity agitated in the afternoon. Threatened to leave     Plan:  MRI Brain pending   Follow-up on blood culture  Delirium precautions  Discontinue ceftriaxone and Vancomycin  Continue Cefazolin while inpatient  Transition to PO Cefdinir on Discharge  Per wife melatonin helps calm him down.All psych medication recently held. Family requesting we not give ativan or antipsychotics   Once MRI completed, if negative can Discharge  MRI completed; chronic changes noted; no acute abnormalities; patient planned for discharge.    SIRS (systemic inflammatory response syndrome) ([facility])  Patient met SIRS criteria on admission for hypothermia and hypotension, was breifly placed on vaso gtt and given 500 mL bolus with resolution of hypotension  Patient also received cefepime and vancomycin   Patient's son notes that he frequently has problems with hypotension, and that is baseline BP is low  Patient did note 4 episodes of diarrhea overnight.  Source of infection is left pinky with concerning wounds.     Plan:  Discontinued ceftriaxone and Vancomycin   Start cefazolin   Wound care consult  Start mupirocin for left pinky finger  HFrEF (heart failure with reduced ejection fraction) ([facility])  Wt Readings from Last 3  "Encounters:   03/24/25 105 kg (230 lb 9.6 oz)   03/19/25 98.4 kg (217 lb)   02/19/25 96.2 kg (212 lb)     Echo 3/1/25 EF 35-40%, moderate systolic dysfunction, G3DD, global hypokinesis, severely dilated LA, moderate to severe AS and TR.  Patient recently underwent PCI on 3/6/25 at [facility] with restenting of LAD, discharged on 3/14 with plans to follow-up for assessment for potential TAVR  His hospitalization was complicated by hypotension that required ICU level of care and pressor support    Home med: Metoprolol succinate 25 mg qHS, Entresto 24-26 mg qHS, ASA 81 mg, Prasugrel 10 mg QD, Atorvastatin 40 mg QD    Plan:  Continue home regimen  Hold presugrel in the setting of possible stroke  Possible cardiolgy consult  ESRD on dialysis ([facility])  Lab Results   Component Value Date    EGFR 11 03/23/2025    EGFR 11 03/22/2025    EGFR 8 03/21/2025    CREATININE 4.99 (H) 03/23/2025    CREATININE 4.78 (H) 03/22/2025    CREATININE 6.02 (H) 03/21/2025   Patient on MWF HD  Patient was previously on sevelamer and cinacalcet but these were discontinued during recent admission in [place]   Patient does take midodrine 5 mg prior to dialysis    Plan:  Consult nephrology to set up HD  Midodrine 5 mg prior to HD  Primary hypertension  Home med: Metoprolol succinate 25 mg qHS, Entresto 24-26 mg qHS  Mood disorder (HCC)  Per patient's son, was previously on VPA but this medication was discontinued during his recent admission in [place] d/t concerns for thrombocytopenia  Type 2 diabetes mellitus ([facility])  Lab Results   Component Value Date    HGBA1C 5.5 11/07/2024       No results for input(s): \"POCGLU\" in the last 72 hours.      Blood Sugar Average: Last 72 hrs:  (P) 116    No home medications at this time.  HgbA1c 5.5    Plan:  CHO control diet    Anemia due to chronic kidney disease, on chronic dialysis ([facility])  Lab Results   Component Value Date    EGFR 11 03/23/2025    EGFR 11 03/22/2025    EGFR 8 03/21/2025    CREATININE 4.99 (H) 03/23/2025    " CREATININE 4.78 (H) 03/22/2025    CREATININE 6.02 (H) 03/21/2025     Hgb 10.1 on arrival to ED, consistent with baseline of 9-10    Plan:  CBC daily  Hyponatremia    Fluid overload  Plan HD today      Medical Problems       Resolved Problems  Date Reviewed: 3/21/2025          Resolved    Altered mental status 3/21/2025     Resolved by  Jose Denny DO        Discharging Physician / Practitioner: Jose Barger MD  PCP: BRITTANY Allen  Admission Date:   Admission Orders (From admission, onward)       Ordered        03/21/25 0815  INPATIENT ADMISSION  Once                          Discharge Date: 03/24/25    Consultations During Hospital Stay:  Cardiology  Neurology  Nephrology    Procedures Performed:   None    Significant Findings / Test Results:   MRI Brain: No acute infarction, edema, or mass effect. Small chronic left thalamic, right frontal lobe, and left posterior occipital lobe infarcts with encephalomalacia and mild gliosis.    Incidental Findings:   None     Test Results Pending at Discharge (will require follow up):   None     Outpatient Tests Requested:  None    Complications:  None    Reason for Admission: Altered mental status.    Hospital Course:   Asad Galloway is a 65 y.o. male patient who originally presented to the hospital on 3/21/2025 due to altered mental status.  Neurology and cardiology consulted along with nephrology due to dialysis dependence.  Neurology recommended stroke precautions and CT head which showed a new hypodensity in right hemipons.  Recommended MRI head.  During his admission his BP became hypotensive requiring the initiation of midodrine three times daily and holding of entresto.  MRI was able to be performed and showed no acute infarcts. PT level 2; per daughter requesting home PT.  Patient is stable for DC to home with home PT.          Please see above list of diagnoses and related plan for additional information.     Condition at Discharge:  "fair    Discharge Day Visit / Exam:   Subjective:  Patient seen and examined at bedside.  No acute events reported overnight.  Denies any acute complaints.  Vitals: Blood Pressure: (!) 103/46 (03/24/25 0746)  Pulse: 85 (03/24/25 0746)  Temperature: (!) 97 °F (36.1 °C) (03/24/25 0501)  Temp Source: Oral (03/22/25 1620)  Respirations: 18 (03/23/25 0805)  Height: 5' 9\" (175.3 cm) (03/21/25 1925)  Weight - Scale: 101 kg (222 lb 14.2 oz) (03/24/25 0700)  SpO2: 95 % (03/24/25 0746)  Physical Exam  Vitals and nursing note reviewed.   Constitutional:       General: He is not in acute distress.     Appearance: He is well-developed.   HENT:      Head: Normocephalic and atraumatic.   Eyes:      Conjunctiva/sclera: Conjunctivae normal.   Cardiovascular:      Rate and Rhythm: Normal rate and regular rhythm.      Heart sounds: No murmur heard.  Pulmonary:      Effort: Pulmonary effort is normal. No respiratory distress.      Breath sounds: Normal breath sounds.   Abdominal:      Palpations: Abdomen is soft.      Tenderness: There is no abdominal tenderness.   Musculoskeletal:      Cervical back: Neck supple.      Right lower leg: Edema present.      Left lower leg: Edema present.   Skin:     General: Skin is warm and dry.      Capillary Refill: Capillary refill takes less than 2 seconds.   Neurological:      Mental Status: He is alert.   Psychiatric:         Mood and Affect: Mood normal.          Discussion with Family: Updated  (daughter) at bedside.    Discharge instructions/Information to patient and family:   See after visit summary for information provided to patient and family.      Provisions for Follow-Up Care:  See after visit summary for information related to follow-up care and any pertinent home health orders.      Mobility at time of Discharge:   Basic Mobility Inpatient Raw Score: 15  JH-HLM Goal: 4: Move to chair/commode  JH-HLM Achieved: 7: Walk 25 feet or more  HLM Goal achieved. Continue to " encourage appropriate mobility.     Disposition:   Home    Planned Readmission: No    Discharge Medications:  See after visit summary for reconciled discharge medications provided to patient and/or family.      Administrative Statements   Discharge Statement:  I have spent a total time of 50 minutes in caring for this patient on the day of the visit/encounter. .    **Please Note: This note may have been constructed using a voice recognition system**

## 2025-03-24 NOTE — ASSESSMENT & PLAN NOTE
"Lab Results   Component Value Date    HGBA1C 5.5 11/07/2024       No results for input(s): \"POCGLU\" in the last 72 hours.      Blood Sugar Average: Last 72 hrs:  (P) 116    No home medications at this time.  HgbA1c 5.5    Plan:  CHO control diet    "

## 2025-03-24 NOTE — DISCHARGE INSTR - AVS FIRST PAGE
Dear Asad Galloway,     It was our pleasure to care for you here at [facility].  It is our hope that we were always able to exceed the expected standards for your care during your stay.  You were hospitalized due to Altered mental status.  You were cared for on the 3rd floor by Jose Barger MD under the service of Nae John MD with the [facility]ist [facility] who covers for your primary care physician (PCP), BRITTANY Allen, while you were hospitalized.  If you have any questions or concerns related to this hospitalization, you may contact us at .  For follow up as well as any medication refills, we recommend that you follow up with your primary care physician.  A registered nurse will reach out to you by phone within a few days after your discharge to answer any additional questions that you may have after going home.  However, at this time we provide for you here, the most important instructions / recommendations at discharge:     Notable Medication Adjustments -   STOP Entresto until you are re-evaluated by your primary care physician  Start taking Keflex 500 mg capsule; take 1 tablet every 12 hours for 3 days starting tomorrow.  Testing Required after Discharge -   None  ** Please contact your PCP to request testing orders for any of the testing recommended here **  Important follow up information -   Please follow up with your primary care physician within 1 week  Referral to home health services has been provided to help establish home physical therapy.  Other Instructions -   If you have fever, chest pain, lightheadedness, confusion, loss of consciousness; please return to the emergency department.  Please review this entire after visit summary as additional general instructions including medication list, appointments, activity, diet, any pertinent wound care, and other additional recommendations from your care  team that may be provided for you.      Sincerely,     Jose Barger MD

## 2025-03-24 NOTE — WOUND OSTOMY CARE
Consult Note - Wound   Asad Galloway 65 y.o. male MRN: [id number]  Unit/Bed#: S -01 Encounter: [id number]        History and Present Illness:  Patient is a 66 yo male that was admitted to [facility]  for treatment of acute encephalopathy. Patient has a PMH of ESRD on MWF HD, CAD s/p PCI, CVA in 2018, T2DM, HLD, HTN, GERD. Patient is alert and oriented times two and agreeable to assessment. Patient is assist for turning and repositioning. Patient is continent of bowel and bladder. On assessment, patient is OOB to recliner and son is at bedside.    Wound Care was consulted for left 5th finger wound.     Assessment Findings:   B/L heels are dry intact and dee with no skin loss or wounds present. Recommend preventative Hydraguard Cream and proper offloading/ repositioning.      B/L sacro-buttocks is dry, intact, pink in color and blanches. No skin loss or wounds present. Recommend preventative hydragaurd to area.     Left 5th finger - small irregular shaped area of intact adherent scab. No open aspects or drainage. Lani wound erythematous and swollen. Etiology unclear. Recommend FluxDrive no sting barrier film.     No induration, fluctuance, odor, warmth/temperature differences, redness, or purulence noted to the above noted wounds and skin areas assessed. New dressings applied per orders listed below. Patient tolerated well- no s/s of non-verbal pain or discomfort observed during the encounter. Bedside nurse aware of plan of care. See flow sheets for more detailed assessment findings.      Orders listed below and wound care will continue to follow, call or Secure Chat with questions.     Skin care plans:  1-Hydraguard to bilateral sacrum, buttock and heels BID and PRN  2-Elevate heels to offload pressure.  3-Ehob cushion in chair when out of bed.  4-Moisturize skin daily with skin nourishing cream.  5-Turn/reposition q2h for pressure re-distribution on skin.  6-Left 5th Finger: cleanse with NSS soaked gauze. Pat dry.  Cover scabs with 3M no sting barrier daily. Leave open to air    Wounds:    Wound 03/24/25 Finger D5, small Left;Posterior (Active)   Wound Image   03/24/25 1005   Wound Description Pink;Swelling;Dry;Intact;Brown 03/24/25 1005   Non-staged Wound Description Not applicable 03/24/25 1005   Wound Length (cm) 1 cm 03/24/25 1005   Wound Width (cm) 1 cm 03/24/25 1005   Wound Depth (cm) 0 cm 03/24/25 1005   Wound Surface Area (cm^2) 1 cm^2 03/24/25 1005   Wound Volume (cm^3) 0 cm^3 03/24/25 1005   Calculated Wound Volume (cm^3) 0 cm^3 03/24/25 1005   Lani-wound Assessment Erythema;Swelling 03/24/25 1005   Treatments Site care 03/24/25 1005   Dressing Protective barrier 03/24/25 1005   Dressing Changed Other (Comment) 03/24/25 1005   Patient Tolerance Tolerated well 03/24/25 1005               Beatriz Portillo RN, BSN, CCRN

## 2025-03-24 NOTE — ASSESSMENT & PLAN NOTE
MWF at [facility]  Access: Left upper extremity AV fistula  EDW 94.5 kg  Required brief CRRT during admission at [place] at time of cardiac cath with stenting  Status post intermittent HD on 03/21 and extra session of hemodialysis on 03/22  HD today

## 2025-03-24 NOTE — PROGRESS NOTES
NEPHROLOGY HOSPITAL PROGRESS NOTE   Asad Galloway 65 y.o. male MRN: [id number]  Unit/Bed#: S -01 Encounter: [id number]  Reason for Consult: ESRD on HD    Assessment & Plan  ESRD on dialysis ([facility])  MWF at [facility]  Access: Left upper extremity AV fistula  EDW 94.5 kg  Required brief CRRT during admission at [facility] at time of cardiac cath with stenting  Status post intermittent HD on 03/21 and extra session of hemodialysis on 03/22  HD today  Primary hypertension  Home Rx: Entresto, Toprol-XL, midodrine 10 mg 3 times a week with HD  Continue midodrine with HD  Continue metoprolol with hold parameters  Continue to hold Entresto for now  Hyponatremia  Due to lack of free water clearance in setting of ESRD  Sodium level today 133  Continue fluid restriction and attempt UF on HD  HFrEF (heart failure with reduced ejection fraction) ([facility])  LVEF 35-40% with G2 DD, moderate to severe AS, mild to moderate MR, mild to moderate TR  UF on HD as above  Anemia due to chronic kidney disease, on chronic dialysis ([facility])  Hemoglobin today 9.7, very close to goal  Continue outpatient management  Chronic kidney disease-mineral and bone disorder  Continue Hectorol 2 mcg 3 times per week with HD  Monitor PTH as outpatient  Acute encephalopathy  MRI brain: No acute infarction, edema or mass effect, small chronic left thalamic, 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    SUBJECTIVE / 24H INTERVAL HISTORY:  Denies dyspnea.  Asking  to go home.    OBJECTIVE:  Current Weight: Weight - Scale: 105 kg (230 lb 9.6 oz)  Vitals:    03/23/25 1443 03/23/25 1445 03/23/25 2142 03/24/25 0501   BP: 95/65 95/65 (!) 104/45 121/55   BP Location:       Pulse: 78 78 87 96   Resp:       Temp: 98.5 °F (36.9 °C) 98.5 °F (36.9 °C) 97.7 °F (36.5 °C) (!) 97 °F (36.1 °C)   TempSrc:       SpO2: 97% 97% 97% 98%   Weight:    105 kg (230 lb 9.6 oz)   Height:           Intake/Output Summary (Last 24 hours) at 3/24/2025 0703  Last data filed at 3/24/2025 0520  Gross per 24 hour   Intake 1180 ml   Output 0 ml   Net 1180 ml     Review of Systems   Constitutional:  Negative for chills and fever.   HENT:  Negative for ear pain and sore throat.    Eyes:  Negative for pain and visual disturbance.   Respiratory:  Negative for cough and shortness of breath.    Cardiovascular:  Negative for chest pain and palpitations.   Gastrointestinal:  Negative for abdominal pain and vomiting.   Genitourinary:  Negative for dysuria and hematuria.   Musculoskeletal:  Negative for arthralgias and back pain.   Skin:  Negative for color change and rash.   Neurological:  Negative for seizures and syncope.   All other systems reviewed and are negative.    Physical Exam  Vitals and nursing note reviewed.   Constitutional:       General: He is not in acute distress.     Appearance: He is well-developed.   HENT:      Head: Normocephalic and atraumatic.   Eyes:      Conjunctiva/sclera: Conjunctivae normal.   Cardiovascular:      Rate and Rhythm: Normal rate and regular rhythm.      Pulses: Normal pulses.      Heart sounds: Murmur heard.      Comments: Left upper extremity AV fistula with positive thrill and bruit  Pulmonary:      Effort: Pulmonary effort is normal. No respiratory distress.      Breath sounds: Normal breath sounds.   Abdominal:      Palpations: Abdomen is soft.      Tenderness: There is no abdominal tenderness.   Musculoskeletal:         General: No swelling.      Cervical back: Neck  supple.      Right lower leg: Edema present.      Left lower leg: Edema present.   Skin:     General: Skin is warm and dry.      Capillary Refill: Capillary refill takes less than 2 seconds.   Neurological:      Mental Status: He is alert.   Psychiatric:         Mood and Affect: Mood normal.       Medications:    Current Facility-Administered Medications:     acetaminophen (Ofirmev) injection 1,000 mg, 1,000 mg, Intravenous, Once, Yadira Garcia MD    aspirin chewable tablet 81 mg, 81 mg, Oral, Daily, Jose Denny DO, 81 mg at 03/23/25 0829    atorvastatin (LIPITOR) tablet 40 mg, 40 mg, Oral, Daily With Dinner, Jose Denny DO, 40 mg at 03/23/25 1623    ceFAZolin (ANCEF) IVPB (premix in dextrose) 1,000 mg 50 mL, 1,000 mg, Intravenous, Q12H, Yadira Garcia MD, Last Rate: 100 mL/hr at 03/24/25 0136, 1,000 mg at 03/24/25 0136    heparin (porcine) subcutaneous injection 5,000 Units, 5,000 Units, Subcutaneous, Q8H MICH, 5,000 Units at 03/24/25 0521 **AND** [COMPLETED] Platelet count, , , Once, Jose Denny DO    melatonin tablet 6 mg, 6 mg, Oral, HS, Yadira Garcia MD, 6 mg at 03/23/25 2232    metoclopramide (REGLAN) injection 10 mg, 10 mg, Intravenous, Q6H PRN, Gilmer Mckeon MD, 10 mg at 03/23/25 2232    metoprolol succinate (TOPROL-XL) 24 hr tablet 25 mg, 25 mg, Oral, Daily, Jose Denny DO, 25 mg at 03/22/25 0807    midodrine (PROAMATINE) tablet 10 mg, 10 mg, Oral, Before Dialysis, Lexi Page PA-C, 10 mg at 03/22/25 1533    midodrine (PROAMATINE) tablet 5 mg, 5 mg, Oral, TID AC, Berto Duran MD, 5 mg at 03/24/25 0521    mupirocin (BACTROBAN) 2 % ointment, , Topical, TID, Jose Barger MD, 1 Application at 03/23/25 1742    prasugrel (EFFIENT) tablet 10 mg, 10 mg, Oral, Daily, Jose Denny DO, 10 mg at 03/23/25 0830    [Held by provider] sacubitril-valsartan (ENTRESTO) 24-26 MG per tablet 1 tablet, 1 tablet, Oral, Daily, Yadira Garcia MD, 1 tablet at 03/21/25 1223    Laboratory  "Results:  Results from last 7 days   Lab Units 03/24/25  0518 03/23/25  0448 03/22/25  0636 03/21/25  1439 03/21/25  0455   WBC Thousand/uL 4.52 4.82 4.46  --  4.48   HEMOGLOBIN g/dL 9.7* 10.2* 9.9*  --  10.1*   HEMATOCRIT % 31.8* 32.2* 30.8*  --  32.2*   PLATELETS Thousands/uL 89* 95* 94* 100* 117*   POTASSIUM mmol/L 4.7 4.5 3.9  --  4.8   CHLORIDE mmol/L 96 95* 96  --  95*   CO2 mmol/L 23 26 27  --  27   BUN mg/dL 39* 29* 28*  --  38*   CREATININE mg/dL 6.44* 4.99* 4.78*  --  6.02*   CALCIUM mg/dL 8.6 8.5 8.2*  --  8.6   MAGNESIUM mg/dL  --   --  1.9  --  2.0   PHOSPHORUS mg/dL  --   --  4.8*  --   --        Portions of the record may have been created with voice recognition software. Occasional wrong word or \"sound a like\" substitutions may have occurred due to the inherent limitations of voice recognition software. Read the chart carefully and recognize, using context, where substitutions have occurred.If you have any questions, please contact the dictating provider.    "

## 2025-03-24 NOTE — ASSESSMENT & PLAN NOTE
Lab Results   Component Value Date    EGFR 11 03/23/2025    EGFR 11 03/22/2025    EGFR 8 03/21/2025    CREATININE 4.99 (H) 03/23/2025    CREATININE 4.78 (H) 03/22/2025    CREATININE 6.02 (H) 03/21/2025   Patient on MWF HD  Patient was previously on sevelamer and cinacalcet but these were discontinued during recent admission in [place]   Patient does take midodrine 5 mg prior to dialysis    Plan:  Consult nephrology to set up HD  Midodrine 5 mg prior to HD

## 2025-03-24 NOTE — ASSESSMENT & PLAN NOTE
Lab Results   Component Value Date    EGFR 11 03/23/2025    EGFR 11 03/22/2025    EGFR 8 03/21/2025    CREATININE 4.99 (H) 03/23/2025    CREATININE 4.78 (H) 03/22/2025    CREATININE 6.02 (H) 03/21/2025     Hgb 10.1 on arrival to ED, consistent with baseline of 9-10    Plan:  CBC daily

## 2025-03-25 ENCOUNTER — PATIENT OUTREACH (OUTPATIENT)
Dept: CASE MANAGEMENT | Facility: OTHER | Age: 65
End: 2025-03-25

## 2025-03-25 ENCOUNTER — APPOINTMENT (OUTPATIENT)
Facility: CLINIC | Age: 65
End: 2025-03-25
Payer: MEDICARE

## 2025-03-25 ENCOUNTER — TELEPHONE (OUTPATIENT)
Age: 65
End: 2025-03-25

## 2025-03-25 NOTE — TELEPHONE ENCOUNTER
Patient had telemedicine visit with you on 3/20 and was admitted to the hospital the next day.  BP was low during hospitalization and Entresto was held.      Patient called today, said he was discharged yesterday, BP is improved, he has resumed Entresto and he asked that it be added back on his med list.    Seen by Declan during admission who notes ok to resume Entresto.

## 2025-03-25 NOTE — PROGRESS NOTES
"Sam returned my call. States his father is doing okay. \"I did not hear from PT yet\" Explained to Sam that [facility] was not norma to take his dad at this time I can try [facility]. Sam is fine with that.   Sam states his dad has his medication and is back on entresto now. He said his cardiology in [place] today and they did update cardiologist here about entresto.   I will send referral email to [facility].   "

## 2025-03-26 ENCOUNTER — APPOINTMENT (OUTPATIENT)
Facility: CLINIC | Age: 65
End: 2025-03-26
Payer: MEDICARE

## 2025-03-26 LAB
BACTERIA BLD CULT: NORMAL
BACTERIA BLD CULT: NORMAL

## 2025-03-27 ENCOUNTER — APPOINTMENT (OUTPATIENT)
Facility: CLINIC | Age: 65
End: 2025-03-27
Payer: MEDICARE

## 2025-03-27 ENCOUNTER — PATIENT OUTREACH (OUTPATIENT)
Dept: CASE MANAGEMENT | Facility: OTHER | Age: 65
End: 2025-03-27

## 2025-03-27 NOTE — PROGRESS NOTES
Spoke with Sam patients son to let him know that [facility] will be contacting him to set up PT and OT I asked if his dad will be attending outpatient PT that I see listed in his chart Sam said they are gong to try both. I did inform him insurance will probably not cover both

## 2025-03-28 ENCOUNTER — APPOINTMENT (OUTPATIENT)
Facility: CLINIC | Age: 65
End: 2025-03-28
Payer: MEDICARE

## 2025-03-28 DIAGNOSIS — I10 PRIMARY HYPERTENSION: ICD-10-CM

## 2025-03-28 RX ORDER — SACUBITRIL AND VALSARTAN 24; 26 MG/1; MG/1
1 TABLET, FILM COATED ORAL 2 TIMES DAILY
Qty: 180 TABLET | Refills: 3 | Status: SHIPPED | OUTPATIENT
Start: 2025-03-28

## 2025-03-30 ENCOUNTER — HOSPITAL ENCOUNTER (OUTPATIENT)
Dept: VASCULAR ULTRASOUND | Facility: HOSPITAL | Age: 65
Discharge: HOME | End: 2025-03-30
Payer: MEDICARE

## 2025-03-30 DIAGNOSIS — R04.2 HEMOPTYSIS: ICD-10-CM

## 2025-03-30 DIAGNOSIS — I82.629: ICD-10-CM

## 2025-03-30 DIAGNOSIS — I82.623: ICD-10-CM

## 2025-03-30 PROCEDURE — 93970 EXTREMITY STUDY: CPT | Performed by: SURGERY

## 2025-03-30 PROCEDURE — 93970 EXTREMITY STUDY: CPT

## 2025-03-31 ENCOUNTER — APPOINTMENT (OUTPATIENT)
Facility: CLINIC | Age: 65
End: 2025-03-31
Payer: MEDICARE

## 2025-04-01 ENCOUNTER — OFFICE VISIT (OUTPATIENT)
Dept: ENDOCRINOLOGY | Facility: CLINIC | Age: 65
End: 2025-04-01
Payer: MEDICARE

## 2025-04-01 ENCOUNTER — EVALUATION (OUTPATIENT)
Facility: CLINIC | Age: 65
End: 2025-04-01
Payer: MEDICARE

## 2025-04-01 ENCOUNTER — APPOINTMENT (OUTPATIENT)
Facility: CLINIC | Age: 65
End: 2025-04-01
Payer: MEDICARE

## 2025-04-01 VITALS
BODY MASS INDEX: 31.7 KG/M2 | HEIGHT: 69 IN | SYSTOLIC BLOOD PRESSURE: 140 MMHG | HEART RATE: 88 BPM | WEIGHT: 214 LBS | DIASTOLIC BLOOD PRESSURE: 90 MMHG

## 2025-04-01 DIAGNOSIS — R53.1 GENERALIZED WEAKNESS: ICD-10-CM

## 2025-04-01 DIAGNOSIS — E78.2 MIXED HYPERLIPIDEMIA: ICD-10-CM

## 2025-04-01 DIAGNOSIS — E11.22 TYPE 2 DIABETES MELLITUS WITH CHRONIC KIDNEY DISEASE ON CHRONIC DIALYSIS, WITHOUT LONG-TERM CURRENT USE OF INSULIN (HCC): Primary | ICD-10-CM

## 2025-04-01 DIAGNOSIS — Z99.2 TYPE 2 DIABETES MELLITUS WITH CHRONIC KIDNEY DISEASE ON CHRONIC DIALYSIS, WITHOUT LONG-TERM CURRENT USE OF INSULIN (HCC): Primary | ICD-10-CM

## 2025-04-01 DIAGNOSIS — N18.6 TYPE 2 DIABETES MELLITUS WITH CHRONIC KIDNEY DISEASE ON CHRONIC DIALYSIS, WITHOUT LONG-TERM CURRENT USE OF INSULIN (HCC): Primary | ICD-10-CM

## 2025-04-01 DIAGNOSIS — R26.2 AMBULATORY DYSFUNCTION: ICD-10-CM

## 2025-04-01 DIAGNOSIS — R45.86 EMOTIONAL LABILITY: ICD-10-CM

## 2025-04-01 DIAGNOSIS — I10 PRIMARY HYPERTENSION: ICD-10-CM

## 2025-04-01 DIAGNOSIS — F39 MOOD DISORDER (HCC): Chronic | ICD-10-CM

## 2025-04-01 PROCEDURE — 99214 OFFICE O/P EST MOD 30 MIN: CPT | Performed by: PHYSICIAN ASSISTANT

## 2025-04-01 PROCEDURE — 97530 THERAPEUTIC ACTIVITIES: CPT | Performed by: PHYSICAL THERAPIST

## 2025-04-01 RX ORDER — BLOOD SUGAR DIAGNOSTIC
STRIP MISCELLANEOUS
Qty: 100 STRIP | Refills: 1 | Status: SHIPPED | OUTPATIENT
Start: 2025-04-01 | End: 2025-04-09 | Stop reason: ALTCHOICE

## 2025-04-01 RX ORDER — MIDODRINE HYDROCHLORIDE 2.5 MG/1
2.5 TABLET ORAL
COMMUNITY
Start: 2025-03-29

## 2025-04-01 RX ORDER — CINACALCET 30 MG/1
30 TABLET, FILM COATED ORAL
COMMUNITY
Start: 2025-02-26

## 2025-04-01 RX ORDER — MIDODRINE HYDROCHLORIDE 10 MG/1
10 TABLET ORAL
COMMUNITY
Start: 2025-03-28

## 2025-04-01 RX ORDER — LANCETS
EACH MISCELLANEOUS
Qty: 100 EACH | Refills: 1 | Status: SHIPPED | OUTPATIENT
Start: 2025-04-01 | End: 2025-04-09 | Stop reason: ALTCHOICE

## 2025-04-01 RX ORDER — SEVELAMER CARBONATE 2.4 G/1
1 POWDER, FOR SUSPENSION ORAL 3 TIMES DAILY
COMMUNITY
Start: 2025-01-14 | End: 2026-01-13

## 2025-04-01 RX ORDER — BLOOD-GLUCOSE METER
EACH MISCELLANEOUS
Qty: 1 KIT | Refills: 0 | Status: SHIPPED | OUTPATIENT
Start: 2025-04-01 | End: 2025-04-09 | Stop reason: ALTCHOICE

## 2025-04-01 RX ORDER — MIDODRINE HYDROCHLORIDE 5 MG/1
5 TABLET ORAL
COMMUNITY
Start: 2025-03-27 | End: 2025-04-09 | Stop reason: SDUPTHER

## 2025-04-01 NOTE — PROGRESS NOTES
PT Re-Evaluation          POC expires Unit limit Auth Expiration date PT/OT + Visit Limit? Co-Insurance   25   [place] primary, 30pcy secondary Yes                                            Today's date: 2025  Patient name: Asad Galloway  : 1960  MRN: [id number]  Referring provider: Mallorie Ulloa*  Dx:   Encounter Diagnosis     ICD-10-CM    1. Ambulatory dysfunction  R26.2 Ambulatory Referral to Physical Therapy      2. Generalized weakness  R53.1 Ambulatory Referral to Physical Therapy      3. Mood disorder (HCC)  F39 Ambulatory Referral to Physical Therapy      4. Emotional lability  R45.86 Ambulatory Referral to Physical Therapy                Assessment  Assessment details: Patient is a 64 y.o. male who presents to skilled PT for impaired balance, general deconditioning with multiple comorbidiites including hx of CVA, CKD on HD. Patient recently discharged on  from this facility to transition to home PT instead. He was hospitalized on  for RLE DVT and initiated home PT. He returned for outpatient PT from - and then was hospitalized again and spent 1 week at SNF, where he overall got weaker. Pt's wife reports most difficulty with sit>stand transfers. He continues to fall although he always has supervision from family members. He demonstrates decline in 5x STS (unable to complete full test), and a decline in [place], both indicative of high fall risk. His TUG score declined by 2 seconds as well, and he remains high fall risk. He was able to complete 2MWT with RW and CG, although last 30 seconds of test he was standing still taking a standing rest break. He requires mod A for sit>stand transfers, and overall CG for any other standing/ walking with RW. Pt and wife given HEP consisting of seated TE and education regarding carryover of home program in order to maximize benefits. Pt and wife both verbalized  understanding. Plan to resume outpatient PT with focus on transfers and ambulation, in order to decrease caregiver burden and maximize pt safety.       Please contact me if you have any questions or recommendations. Thank you for the referral and the opportunity to share in Asad Galloway's care.      Cut off score   All date taken from [facility]TA Neuro Section or Rehab Measures    BURGER test: 46/56                                              5 x STS Test:  MDC: 6 points                                                  MDC: 2.3 seconds   age norms                                                                 Age Norms   60-69 year old = M: 55, F: 55                        60-69 year old: 11.4 seconds   70-79 year old = M 54,  F: 53                       70-79 year old: 12.6 seconds     80-89 year old = M53,   F: 50                       80-89 year old: 14.8 seconds      TUG test:                                                                     10 Meter Walk Test:  MDC: 4.14 seconds       MDC: .59 ft/sec  Cut off score for Falls                                                  Age Norms  > 13.5 seconds community dwelling adults                20-29; M: 4.56 ft/sec F: 4.62 ft/sec  > 32.2 Frail Elderly                                                     30-39: M 4.76 ft/sec  F: 4.68 ft/sec          40-49: M: 4.79 ft/sec  F: 4.62 ft/sec  6 Minute Walk Test      50-59: M: 4.76 ft/sec  F: 4.56 ft/sec  MDC: 190 feet       60-69: M: 4.56 ft/sec  F: 4.26 ft/sec  Age Norms       70-+    M: 4.36 ft/sec  F: 4.16 ft/sec  60-69:    M: 1876 F: 1765  70-79:    M: 1729 F: 1545    FGA:  80-89 +: M: 1368 F; 1286       MCID: 4 points            Geriatrics/ Community Dwelling Older Adults: </= 22/30 fall risk            Geriatrics/ Community Dwelling Older Adults: </= 20/30 unexplained falls in the next 6 months            Parkinsons: </= 18/30 fall risk      Patient verbalized understanding of POC          Impairments: Abnormal  coordination, Abnormal gait, Abnormal muscle tone, Abnormal or restricted ROM, Activity intolerance, Impaired balance, Impaired physical strength, Lacks appropriate HEP, Poor posture, Poor body mechanics, Pain with function, Safety issue, Weight-bearing intolerance, Abnormal movement, Difficulty understanding, Abnormal muscle firing  Understanding of Dx/Px/POC: Excellent  Prognosis: Excellent    Patient verbalized understanding of POC.         Please contact me if you have any questions or recommendations. Thank you for the referral and the opportunity to share in Asad Galloway's care.        Plan  Plan details: complete testing, balance and functional strength/endurance re-training  Patient would benefit from: PT Eval and Skilled PT  Planned modality interventions: Biofeedback, Cryotherapy, TENS, Thermotherapy  Planned therapy interventions: Abdominal trunk stabilization, ADL training, Balance, Balance/WB training, Breathing training, Body mechanics training, Coordination, Functional ROM exercises, Gait training, HEP, Joint Mobilization, Manual Therapy, Griggs taping, Motor coordination training, Neuromuscular re-education, Patient education, Postural training, Strengthening, Stretching, Therapeutic activities, Therapeutic exercises, Therapeutic training, Transfer training, Activity modification, Work reintegration  Frequency: 2-3x/week  Duration in weeks: 12  Plan of Care beginning date: 4/1/2025  Plan of Care expiration date: 12 weeks - 6/24/2025  Treatment plan discussed with: Patient       Goals  Short Term Goals (4 weeks):    - Patient will improve time on TUG by 2.9 seconds to facilitate improved safety in all ambulation  - Patient will be independent in basic HEP 2-3 weeks  - Patient will improve 5xSTS score by 2.3 seconds to promote improved LE functional strength needed for ADLs  - Patient will perform 6MWT to assess cardiovascular endurance and improve ability to ambulate for prolonged periods of  time.  - Patient will perform sit to stand with CG.      Long Term Goals (12 weeks):  - Patient will be independent in a comprehensive home exercise program  - Patient will improve gait speed by 0.18 m/s to improve safety with community ambulation  - Patient will improve BURGER by 6 points in order to improve static balance and reduce risk for falls  - Patient will improve 6 Minute Walk Test score by 190 feet to promote improved cardiovascular endurance  - Patient will report 50% reduction in near falls in order to improve safety with functional tasks and reduce his risk for falls  - Patient will report going on walks at least 3 days per week to promote independence and improved cardiovascular endurance  - Patient will be able to ascend/descend stairs reciprocally with 1 UE assist to promote independence and safety with ADL  - Patient will report 50% reduction in near falls when ambulating on uneven terrain  - Patient will be able to perform sit to stand with CS.  - Patient will be able to ambulate 150 ft with SPC and CG.       Cut off score    All date taken from [facility]TA Neuro Section or Rehab Measures      Burger/56  MDC: 6 pts  Age Norms:  60-69: M - 55   F - 55  70-79: M - 54   F - 53  80-89: M - 53   F - 50 5xSTS: June et al 2010  MDC: 2.3 sec  Age Norms:  60-69: 11.4 sec  70-79: 12.6 sec  80-89: 14.8 sec   TUG  MDC: 4.14 sec  Cut off score:  >13.5 sec community dwelling adults  >32.2 frail elderly  <20 I for basic transfers  >30 dependent on transfers 10 Meter Walk Test: Shmuel and Christiano et al 2011  MDC: 0.18 m/s  20-29: M - 1.35 m   F - 1.34 m  30-39: M - 1.43 m   F - 1.34 m  40-49: M - 1.43 m   F - 1.39 m  50-59: M - 1.43 m   F - 1.31 m  60-69: M - 1.34 m   F - 1.24 m  70-79: M - 1.26 m   F - 1.13 m  80-89: M - 0.97 m   F - 0.94 m    Household Ambulator < 0.4 m/s  Limited Community Ambulator 0.4 - 0.8 m/s  Community Ambulator 0.8 - 1.2 m/s  Safely cross the street > 1.2 m/s   FGA  MCID: 4  pts  Geriatrics/community < 22/30 fall risk  Geriatrics/community < 20/30 unexplained falls    DGI  MDC: vestibular - 4 pts  MDC: geriatric/community - 3 pts  Falls risk <19/24 mCTSIB  Norm: 20-60 yrs  Eyes open firm: norm sway 0.21-0.48  Eyes closed firm: norm sway 0.48-0.99  Eyes open foam: norm sway 0.38-0.71  Eyes closed foam: norm sway 0.70-2.22   6 Minute Walk Test  MDC: 190.98 ft  MCID: 164 ft    Age Norms  60-69: M - 1876 ft (571.80 m)  F - 1765 ft (537.98 m)  70-79: M - 1729 ft (527.00 m)  F - 1545 ft (470.92 m)  80-89: M - 1368 ft (416.97 m)  F - 1286 ft (391.97 m) ABC: Smith & Tre, 2003  <67% increased risk for falls   [place] Monofilaments  Evaluator Size:        Force (grams):          Hand/Dorsal Thresholds:        Plantar Thresholds:  - 1.65                       - 0.008                       - Normal                                 - Normal  - 2.36                       - 0.02                         - Normal                                 - Normal  - 2.44                       - 0.04                         - Normal                                 - Normal  - 2.83                       - 0.07                         - Normal                                 - Normal  - 3.22                       - 0.16                         - Diminished light touch          - Normal  - 3.61                       - 0.40                         - Diminished light touch          - Normal  - 3.84                       - 0.60                         - Diminished protective           - Diminished light touch  - 4.08                       - 1.00                         - Diminished protective           - Diminished light touch  - 4.17                       - 1.40                         - Diminished protective           - Diminished light touch  - 4.31                       - 2.00                         - Diminished protective           - Diminished light touch  - 4.56                       - 4.00                          - Loss of protective sense      - Diminished protective  - 4.74                       - 6.00                         - Loss of protective sense      - Diminished protective  - 4.93                       - 8.00                         - Loss of protective sense      - Diminished protective  - 5.07                       - 10.0                         - Loss of protective sense     - Loss of protective sense  - 5.18                       - 15.0                         - Loss of protective sense     - Loss of protective sense  - 5.46                       - 26.0                         - Loss of protective sense     - Loss of protective sense  - 5.88                       - 60.0                         - Loss of protective sense     - Loss of protective sense  - 6.10                       - 100                          - Loss of protective sense     - Loss of protective sense  - 6.45                       - 180                          - Loss of protective sense     - Loss of protective sense  - 6.65                       - 300                          - Deep pressure sense only  - Deep pressure sense only         Subjective    History of Present Illness  - Mechanism of injury: Patient was being tx at [facility] OP location; transitioned to [place] for neuro/balance PT and neuro OT. He has CKD on HD M, W, Fx 5 years ; recent visit to ED 8/23 fistula bleeding but resolved. He  has a past medical history of Cerebrovascular accident (CVA) due to thrombosis of left middle cerebral artery (HCC) (07/29/2018), Chronic kidney disease, Coronary artery disease, Diabetes mellitus (HCC), Dialysis patient (HCC), Fistula, GERD (gastroesophageal reflux disease), History of coronary artery stent placement, Hypercholesteremia, Hyperlipidemia, Hypertension, Infectious viral hepatitis, Limb alert care status, Neuropathy, Obesity, Osteomyelitis (HCC), PVC's (premature ventricular contractions), Stroke (HCC), TIA (transient  "ischemic attack) (10/28/2018). He reports impaired balance and increased reliance on family for assist with ADLs; he wants to improve balance and independence    Update 10/10/24: Patient reports balance is improving with PT. He feels stronger    Updated 24: Patient reports that therapy has been \"beautiful.\" \"Everything has gotten better.\"     Updated 25: Pt was discharged from outpatient PT on  with plans to initiate home PT. He went to the hospital on  diagnosed with RLE DVT and discharged home with home PT. Pt's wife reports home PT just had him walking, not really doing many other exercises or balance interventions. They return now for outpatient PT to receive more intensive therapy. Pt's wife reports he is falling every day and she cannot keep picking him up. They want his walking to get better. He always has supervision/assist at home, if his wife is at work his adult children stay with him.     Updated 25: Pt was hospitalized at [facility] for 17 days and had 3 cardiac caths, then was at [facility] for 2 weeks. Was discharged to SNF where he spent 8 days but hardly had any therapy. D/C home and now presents to PT. Pt's wife reports his mobility is worse than before. He is scheduled for valve replacement at [facility] in May. He had 1 fall since getting home, but family is with him .     - Primary AD: RW  - Assist level at home: family assists him with ADLs  - Decreased fine motor tasks: No    Patient goal:  \"I want to go home and get better and never come back here again\"    Pain  - Current pain ratin/10  - At best pain ratin/10  - At worst pain ratin/10  - Location: low back  - Aggravating factors: unsure     Social Support  - Steps to enter house: 3  - Stairs in house: yes but bedroom on 1st floor    - Lives in: multi story house   - Lives with: wife and 2 kids     - Employment status: retired   - Hand dominance: right    Treatments  - Previous treatment: outpatient neuro PT at " this site  - Current treatment: re-initiating PT eval/ tx        Objective     LE MMT  - R Hip Flexion: 3+/5  L Hip Flexion: 3+/5  - R Hip Abduction: 3+/5  L Hip Abduction: 3+/5  - R Hip Adduction: 3+/5  L Hip Adduction: 3+/5  - R Knee Extension: 3+/5  L Knee Extension: 3+/5  - R Ankle DF: 4/5   L Ankle DF: 4/5  - R Ankle PF: 4/5   L Ankle PF: 4/5    Sensation  - Light touch: intact     Coordination  - Heel to Shin: impaired B/L  - Alternate Toe Taps: impaired B/L      Gait  - Abnormalities: ambulates without AD on eval, demos narrow JOSE DAVID, inconsistent step length, lateral instability     Functional mobility:  - Sit to stand: min-mod A  - Transfers: min A with RW        Outcome Measures Initial Eval  9/5/24 Re-eval  10/10/24 Re eval   11/7/24 Re-eval  1/30/25 Re-eval  4/1/25    5xSTS 15.85 sec, 2 UE  15.41 sec, 2 UE  21.06 sec, pushing off thighs  46.97 sec pushing off thighs + mod A Only performed 3 reps, 25.63 sec    TUG  - Regular  - Cognitive   17.3 sec, no AD  24.3 sec, (serial 3s)   12.59 sec, no AD  18.56 sec, no AD (serial 3s)   16. 44 sec, pushing off thighs  19.74 sec, serial 3s   22.11 sec with RW    27.53 sec with RW, serial 3s   24.52 sec with RW    10 meter   1.18 m/s without AD   0.86 m/s with cane   0.71 m/s wo cane         JENNYFER 30/56 33/56 34/56 14/56 7/56    mCTSIB  - FTEO (firm)  - FTEC (firm)  - FTEO (foam)  - FTEC (foam)   30 sec +  30 ++  2 sec  0 sec   30 sec+  30 sec+  3 sec  0 sec   30 sec+  30 sec+  3 sec   0 sec      6MWT 100 ft (attempted but only able to complete 1 minute) 275 ft in 4 minutes  212 ft in 2 minutes w no cane   2MWT: 84 ft with RW and CG    ABC 47.5% 81.88% 72.5%                          Precautions: falls   Past Medical History:   Diagnosis Date    Cerebrovascular accident (CVA) due to thrombosis of left middle cerebral artery (HCC) 07/29/2018    Chronic kidney disease     Coronary artery disease     Diabetes mellitus (HCC)     not on meds    Dialysis patient (HCC)      M-W-F    Fistula     left upper arm for hemodialyis    GERD (gastroesophageal reflux disease)     History of coronary artery stent placement     x3    Hypercholesteremia     Hyperlipidemia     Hypertension     Infectious viral hepatitis     B as child    Limb alert care status     no BP/IV left arm    Neuropathy     Nonhealing ulcer of heel ([facility]) 04/25/2023    Obesity     Osteomyelitis ([facility])     last assessed 11/4/16-per son not currently    Penetrating foot wound, left, initial encounter 01/19/2022    PVC's (premature ventricular contractions)     sees  Cardio    Stroke ([facility])     last weeof July 2018 [facility]    TIA (transient ischemic attack) 10/28/2018    Ulcer of left heel, limited to breakdown of skin ([facility]) 06/13/2024    Wears dentures     Wears glasses

## 2025-04-01 NOTE — ASSESSMENT & PLAN NOTE
Blood pressure is elevated  Advised to monitor BP at home and follow-up with PCP/nephrology if remaining elevated, at or above 140/90  For now continue current treatment

## 2025-04-01 NOTE — PROGRESS NOTES
Name: Asad Galloway      : 1960      MRN: [id number]  Encounter Provider: Lissette Brennan PA-C  Encounter Date: 2025   Encounter department: [facility] [place]    CC: DM  Assessment & Plan  Type 2 diabetes mellitus with chronic kidney disease on chronic dialysis, without long-term current use of insulin ([facility])  HGA1C 5.5%.   Treatment regimen:  no treatment at this time. Continue with dietary modifications. He can follow-up with his PCP moving forward as BG levels are controlled without medications. He can follow-up here PRN.  Episodes of hypoglycemia can lead to permanent disability and death.  Discussed risks/complications associated with uncontrolled diabetes.    Advised to adhere to diabetic diet, and recommended staying active/exercising routinely.  Advised to call if blood sugars persistently less than 70 mg/dl or over 300 mg/dl.   Recommended routine follow-up with podiatry and ophthalmology.       Lab Results   Component Value Date    HGBA1C 5.5 2024       Orders:    Blood Glucose Monitoring Suppl (OneTouch Verio) w/Device KIT; Use daily    glucose blood (OneTouch Verio) test strip; Use as instructed daily    Lancets (onetouch ultrasoft) lancets; Use as instructed daily    Primary hypertension  Blood pressure is elevated  Advised to monitor BP at home and follow-up with PCP/nephrology if remaining elevated, at or above 140/90  For now continue current treatment         Mixed hyperlipidemia  On statin   Managed by cardiology           History of Present Illness     Asad Galloway is a 65 y.o. male with a history of type 2 diabetes with long term use of insulin. Diabetes course has been stable. Complications of DM: ESRD on HD, TIA, neuropathy.  He was hospitalized in 2025 for acute encephalopathy, hypotension. Denies recent severe hypoglycemic or severe hyperglycemic episodes. Denies any issues with his current regimen. Home glucose monitoring: are  not checked because glucometer is broken.     No log or meter for review today  Wife reports its never above 115 mg/dl.   In the hospital it was controlled.      Current regimen:  no treatment  Hypoglycemic episodes: No, rare  H/o of hypoglycemia causing hospitalization or Intervention such as glucagon injection  or ambulance call :  Yes  Hypoglycemia symptoms: jitteriness and sweating  Treatment of hypoglycemia: soda, juice. Has glucagon pen at home.     Medic alert tag: recommended: Yes     Diabetes education: Yes  Diet: 3 meals per day, 2 snacks per day. Timing of meals is predictable.   Diabetic diet compliance:  compliant most of the time  Activity: Daily activity is predictable: Yes. No routine exercise. He will be starting PT today.     Has HTN: on ARB  Has hyperlipidemia: on statin - tolerating well, no myalgias. compliant most of the time, denies any side effects from medications.  Thyroid disorders: No  History of pancreatitis: No    Last Eye Exam: 11/22/2022  Last Foot Exam: 01/28/2025  Health Maintenance   Topic Date Due    Diabetic Eye Exam  11/22/2023    Diabetic Foot Exam  01/28/2026       Review of Systems   Constitutional:  Negative for activity change, appetite change, fatigue and unexpected weight change.   HENT:  Negative for trouble swallowing.    Eyes:  Negative for visual disturbance.   Respiratory:  Negative for shortness of breath.    Cardiovascular:  Negative for chest pain and palpitations.   Gastrointestinal:  Negative for constipation and diarrhea.   Endocrine: Negative for polydipsia and polyuria.   Musculoskeletal: Negative.    Skin:  Negative for wound (has scab on hands/finger).   Neurological:  Negative for numbness.   Psychiatric/Behavioral: Negative.      as per HPI    Current Outpatient Medications on File Prior to Visit   Medication Sig Dispense Refill    aspirin 81 mg chewable tablet Chew 81 mg daily      atorvastatin (LIPITOR) 40 mg tablet Take 1 tablet (40 mg total) by mouth  "daily with dinner 90 tablet 3    cinacalcet (SENSIPAR) 30 mg tablet Take 30 mg by mouth      melatonin 3 mg Take 3 mg by mouth      metoprolol succinate (TOPROL-XL) 25 mg 24 hr tablet TAKE 1 TABLET BY MOUTH TWICE A DAY (Patient taking differently: Take 25 mg by mouth daily) 180 tablet 1    midodrine (PROAMATINE) 10 MG tablet Take 10 mg by mouth      midodrine (PROAMATINE) 2.5 mg tablet Take 2.5 mg by mouth      midodrine (PROAMATINE) 5 mg tablet Take 5 mg by mouth      MIDODRINE HCL PO Take 5 mg by mouth PRN w/ dialysis      prasugrel (EFFIENT) tablet Take 1 tablet (10 mg total) by mouth daily 90 tablet 3    sacubitril-valsartan (Entresto) 24-26 MG TABS Take 1 tablet by mouth 2 (two) times a day 180 tablet 3    Sevelamer Carbonate 2.4 g 1 packet Three times a day      Vitamin D3 1.25 MG (31828 UT) capsule TAKE 1 CAPSULE BY MOUTH ONE TIME PER WEEK 12 capsule 4     No current facility-administered medications on file prior to visit.         Medical History Reviewed by provider this encounter:  Tobacco  Allergies  Meds  Problems  Med Hx  Surg Hx  Fam Hx     .    Objective   /90   Pulse 88   Ht 5' 9\" (1.753 m)   Wt 97.1 kg (214 lb)   BMI 31.60 kg/m²      Body mass index is 31.6 kg/m².  Wt Readings from Last 3 Encounters:   04/01/25 97.1 kg (214 lb)   03/24/25 97.5 kg (214 lb 15.2 oz)   03/19/25 98.4 kg (217 lb)     Physical Exam  Vitals and nursing note reviewed.   Constitutional:       Appearance: He is well-developed.   HENT:      Head: Normocephalic.   Eyes:      General: No scleral icterus.  Neck:      Thyroid: No thyromegaly.   Cardiovascular:      Rate and Rhythm: Normal rate and regular rhythm.      Pulses:           Radial pulses are 2+ on the right side and 2+ on the left side.      Heart sounds: No murmur heard.  Pulmonary:      Effort: Pulmonary effort is normal. No respiratory distress.      Breath sounds: Normal breath sounds. No wheezing.   Musculoskeletal:      Cervical back: Neck supple. "   Skin:     General: Skin is warm and dry.   Neurological:      Mental Status: He is alert.         Labs:   Lab Results   Component Value Date    HGBA1C 5.5 11/07/2024    HGBA1C 5.9 (H) 07/14/2024    HGBA1C 5.1 01/10/2024     Lab Results   Component Value Date    CREATININE 6.44 (H) 03/24/2025    CREATININE 4.99 (H) 03/23/2025    CREATININE 4.78 (H) 03/22/2025    BUN 39 (H) 03/24/2025     08/10/2016    K 4.7 03/24/2025    CL 96 03/24/2025    CO2 23 03/24/2025     GFR, Calculated   Date Value Ref Range Status   09/08/2020 10 (L) >60 mL/min/1.73m2 Final     Comment:     mL/min per 1.73 square meters                                            Normal Function or Mild Renal    Disease (if clinically at risk):  >or=60  Moderately Decreased:                30-59  Severely Decreased:                  15-29  Renal Failure:                         <15                                            -American GFR: multiply reported GFR by 1.16    Please note that the eGFR is based on the CKD-EPI calculation, and is not intended to be used for drug dosing.                                            Note: Calculated GFR may not be an accurate indicator of renal function if the patient's renal function is not in a steady state.     eGFRcr   Date Value Ref Range Status   01/11/2025 13 (L) >59 Final     eGFR   Date Value Ref Range Status   03/24/2025 8 ml/min/1.73sq m Final     Lab Results   Component Value Date    CHOL 203 (H) 08/10/2016    HDL 30 (L) 01/30/2023    TRIG 123 01/30/2023     Lab Results   Component Value Date    ALT 8 03/23/2025    AST 6 (L) 03/23/2025    ALKPHOS 116 (H) 03/23/2025    BILITOT 0.6 08/10/2016     Lab Results   Component Value Date    LYJ9LZATQHFL 6.133 (H) 03/21/2025    FQE3FNDNZHAX 1.591 12/15/2024    QIP8FJCYGTMJ 1.563 07/17/2024       There are no Patient Instructions on file for this visit.    Discussed with the patient and all questioned fully answered. He will call me if any problems  arise.    Administrative Statements   I have spent a total time of 30 minutes in caring for this patient on the day of the visit/encounter including Importance of tx compliance, Documenting in the medical record, Reviewing/placing orders in the medical record (including tests, medications, and/or procedures), and Obtaining or reviewing history  .

## 2025-04-01 NOTE — ASSESSMENT & PLAN NOTE
HGA1C 5.5%.   Treatment regimen:  no treatment at this time. Continue with dietary modifications. He can follow-up with his PCP moving forward as BG levels are controlled without medications. He can follow-up here PRN.  Episodes of hypoglycemia can lead to permanent disability and death.  Discussed risks/complications associated with uncontrolled diabetes.    Advised to adhere to diabetic diet, and recommended staying active/exercising routinely.  Advised to call if blood sugars persistently less than 70 mg/dl or over 300 mg/dl.   Recommended routine follow-up with podiatry and ophthalmology.       Lab Results   Component Value Date    HGBA1C 5.5 11/07/2024       Orders:    Blood Glucose Monitoring Suppl (OneTouch Verio) w/Device KIT; Use daily    glucose blood (OneTouch Verio) test strip; Use as instructed daily    Lancets (onetouch ultrasoft) lancets; Use as instructed daily

## 2025-04-02 ENCOUNTER — APPOINTMENT (OUTPATIENT)
Facility: CLINIC | Age: 65
End: 2025-04-02
Payer: MEDICARE

## 2025-04-03 ENCOUNTER — OFFICE VISIT (OUTPATIENT)
Facility: CLINIC | Age: 65
End: 2025-04-03
Payer: MEDICARE

## 2025-04-03 ENCOUNTER — APPOINTMENT (OUTPATIENT)
Facility: CLINIC | Age: 65
End: 2025-04-03
Payer: MEDICARE

## 2025-04-03 DIAGNOSIS — R45.86 EMOTIONAL LABILITY: ICD-10-CM

## 2025-04-03 DIAGNOSIS — R53.1 GENERALIZED WEAKNESS: ICD-10-CM

## 2025-04-03 DIAGNOSIS — F39 MOOD DISORDER (HCC): ICD-10-CM

## 2025-04-03 DIAGNOSIS — R26.2 AMBULATORY DYSFUNCTION: Primary | ICD-10-CM

## 2025-04-03 PROCEDURE — 97110 THERAPEUTIC EXERCISES: CPT | Performed by: PHYSICAL THERAPIST

## 2025-04-03 PROCEDURE — 97530 THERAPEUTIC ACTIVITIES: CPT | Performed by: PHYSICAL THERAPIST

## 2025-04-03 NOTE — INTERVAL H&P NOTE
H&P reviewed. After examining the patient I find no changes in the patients condition since the H&P had been written.    Vitals:    02/26/24 0955   BP:    Pulse:    Resp: 18   Temp:    SpO2:      
show

## 2025-04-03 NOTE — PROGRESS NOTES
Daily Note     Today's date: 4/3/2025  Patient name: Asad Galloway  : 1960  MRN: [id number]  Referring provider: Raj Auguste DO  Dx:   Encounter Diagnosis     ICD-10-CM    1. Ambulatory dysfunction  R26.2       2. Generalized weakness  R53.1       3. Mood disorder (HCC)  F39       4. Emotional lability  R45.86               DO NOT OFFER WATER DUE TO FLUID RESTRICTION (dialysis)       Subjective: Pt with no new complaints. Arrives with RW and his wife.      Objective: See treatment diary below    - Nustep x 4 min (with rest breaks after each 1 min interval)    Circuit (performed 3x):  - Sit to stands with 1 airex on seat: 5 reps, min A  - Ambulation with RW around clinic 80 ft, CG    - Mini squats with RW: 5 reps, 2 sets      Assessment: Pt tolerated treatment well with focus on functional mobility. He has demonstrated functional decline since hospitalizations and SNF stay. Poor hand placement during sit>stands, he continues to reach for RW. Overall requires min A for sit>stands, goal is to perform @ CS level. Patient would benefit from continued PT to focus on gait and transfers, with progression to more balance training.      Plan: Continue per plan of care.  Work on sit>stands from hi-lo table at elevated heights     POC expires Unit limit Auth Expiration date PT/OT + Visit Limit? Co-Insurance      [employer] primary, 30pcy secondary Yes                                            Outcome Measures Initial Eval  24 Re-eval  10/10/24 Re eval   24 Re-eval  25 Re-eval  25    5xSTS 15.85 sec, 2 UE  15.41 sec, 2 UE  21.06 sec, pushing off thighs  46.97 sec pushing off thighs + mod A Only performed 3 reps, 25.63 sec    TUG  - Regular  - Cognitive   17.3 sec, no AD  24.3 sec, (serial 3s)   12.59 sec, no AD  18.56 sec, no AD (serial 3s)   16. 44 sec, pushing off thighs  19.74 sec, serial 3s   22.11 sec with RW    27.53 sec with RW, serial 3s   24.52 sec with RW    10 meter   1.18 m/s without AD   0.86  m/s with cane   0.71 m/s wo cane         BURGER 30/56 33/56 34/56 14/56 7/56    mCTSIB  - FTEO (firm)  - FTEC (firm)  - FTEO (foam)  - FTEC (foam)   30 sec +  30 ++  2 sec  0 sec   30 sec+  30 sec+  3 sec  0 sec   30 sec+  30 sec+  3 sec   0 sec      6MWT 100 ft (attempted but only able to complete 1 minute) 275 ft in 4 minutes  212 ft in 2 minutes w no cane   2MWT: 84 ft with RW and CG    ABC 47.5% 81.88% 72.5%

## 2025-04-04 ENCOUNTER — APPOINTMENT (OUTPATIENT)
Facility: CLINIC | Age: 65
End: 2025-04-04
Payer: MEDICARE

## 2025-04-07 NOTE — PROGRESS NOTES
OCCUPATIONAL THERAPY RE-CERTIFICATION    Today's Date: 2025  Patient Name: Asad Galloway  : 1960  MRN: [id number]  Referring Provider: Mallorie Ulloa*  Dx: H/O: stroke [Z86.73]    SKILLED ANALYSIS:  Pt is a right hand dominant 64 year old male presenting to OP OT after break from services 2* multiple hospitalizations 2* ERSD, acute encephalopathy, aggression, HFrEF.  Pt has a h/o CVA in  2018 resulting in significant cognitive deficits and severe decline in his functional status.  Pt is a poor historian and provides inaccurate reports.  Wife reports that he requires set-up assist for UB dressing, assist from wife to don underwear and pants, socks/shoes, showers with supervision, requires assist with tub transfers, all IADLs. Based on assessments, pt demonstrating decline in overall functional cognition, FMC, dexterity, b/l grasp strength compared to February.  Pt is demonstrating deficits in the following domains: overall functional cognitive, insight into deficits, safety awareness, sustained and divided attention, direction following, logical and deductive reasoning, b/l UE weakness, FMC, dexterity, grasp strength.  Recommend OP OT 2x/wk for 12 weeks, however educated pt and his wife on importance of pt participating consistently, as well as attendance policy.  If patient is unable to tolerate participation at this frequency, will decrease.  Pt and wife acknowledge understanding.    Next session, please complete [place] Making Tests and CMT.      Of note, pt presents with 5th digit wound L UE, concerning for necrosis.  Also presents with open wound on 1st digit L LE.  OTR uploaded images of wounds to pt's media tab and sent PCP a message regarding concerns.  Per wife, pt is being seen by doctor for wounds.    Educated pt on charges of insurance, acknowledge understanding, and are in agreement.    PLAN OF CARE START: 25  PLAN OF CARE END: 25  FREQUENCY: 2x/week  PRECAUTIONS dialysis  "3x/week; NO FOOD OR WATER CAN BE GIVEN TO PATIENT; scared of dogs; do not take BP in L UE    Subjective: \"Can I see you on Thursday?\"    Occupational Profile  Pt lives with at home with his wife and two kids (23 and 28), other two kids are  and live on their own. Pt lives in a bi-level home. There are 5 steps down and 7 steps up. Pt resides downstairs. They have a shower bench, grab bars, handles on toilet, and railing to assist with getting into and out of bed. Pt is a retired , he has been retired since his stroke. Pt is not driving. Pt's wife works during the day and pt is alone during that time.  Pt reports he primarily watches cartoons during that time.  Pt reports a few falls in the past month.      PATIENT GOAL: \"To get better, to get stronger\"    HISTORY OF PRESENT ILLNESS:   Pt has h/o CVA in 2018 with residual functional and cognitive deficits.  He is well known to this OT and recently took a break from OP OT 2* multiple hospitalizations and subsequent rehab placement.  Underwent cardiac cath and had multiple stents placed.  Was dx'd with acute encephalopathy.    PMH:   Past Medical History:   Diagnosis Date    Cerebrovascular accident (CVA) due to thrombosis of left middle cerebral artery (HCC) 07/29/2018    Chronic kidney disease     Coronary artery disease     Diabetes mellitus (HCC)     not on meds    Dialysis patient (HCC)     M-W-F    Fistula     left upper arm for hemodialyis    GERD (gastroesophageal reflux disease)     History of coronary artery stent placement     x3    Hypercholesteremia     Hyperlipidemia     Hypertension     Infectious viral hepatitis     B as child    Limb alert care status     no BP/IV left arm    Neuropathy     Nonhealing ulcer of heel (HCC) 04/25/2023    Obesity     Osteomyelitis (HCC)     last assessed 11/4/16-per son not currently    Penetrating foot wound, left, initial encounter 01/19/2022    PVC's (premature ventricular contractions)     sees [facility] " "Cardio    Stroke (HCC)     last weeof July 2018 [facility]    TIA (transient ischemic attack) 10/28/2018    Ulcer of left heel, limited to breakdown of skin (HCC) 06/13/2024    Wears dentures     Wears glasses        Past Surgical Hx:   Past Surgical History:   Procedure Laterality Date    ABDOMINAL SURGERY      CARDIAC CATHETERIZATION N/A 05/02/2022    Procedure: Cardiac Coronary Angiogram;  Surgeon: Sam Davis MD;  [facility];  Service: Cardiology    CARDIAC CATHETERIZATION N/A 05/02/2022    Procedure: Cardiac pci;  Surgeon: Sam Davis MD;  [facility];  Service: Cardiology    CARDIAC CATHETERIZATION  02/01/2023    Procedure: Cardiac catheterization;  Surgeon: Sam Davis MD;  [facility];  Service: Cardiology    CARDIAC CATHETERIZATION N/A 02/01/2023    Procedure: Cardiac pci;  Surgeon: Sam Davis MD;  [facility];  Service: Cardiology    CARDIAC CATHETERIZATION N/A 02/01/2023    Procedure: Cardiac Coronary Angiogram;  Surgeon: Sam Davis MD;  [facility];  Service: Cardiology    CARDIAC CATHETERIZATION N/A 02/01/2023    Procedure: Cardiac other-IVUS;  Surgeon: Sam Davis MD;  [facility];  Service: Cardiology    CHOLECYSTECTOMY      Percutaneous    COLONOSCOPY      CORONARY ANGIOPLASTY WITH STENT PLACEMENT      CYSTOSCOPY      HEMODIALYSIS ADULT  1/22/2024    HEMODIALYSIS ADULT  01/24/2024    IR LOWER EXTREMITY ANGIOGRAM  9/29/2023    IR LOWER EXTREMITY ANGIOGRAM  02/26/2024    OTHER SURGICAL HISTORY      \"stimulator to control bowel movements\"    HI ESOPHAGOGASTRODUODENOSCOPY TRANSORAL DIAGNOSTIC N/A 09/27/2016    Procedure: ESOPHAGOGASTRODUODENOSCOPY (EGD);  Surgeon: Adele Rowe MD;  Location: [facility] GI LAB;  Service: Gastroenterology    HI LAPAROSCOPY SURG CHOLECYSTECTOMY N/A 02/29/2016    Procedure: LAPAROSCOPIC CHOLECYSTECTOMY ;  Surgeon: Cliff Roman DO;  Location: [facility] " "Main OR;  Service: General    TX SLCTV CATHJ 3RD+ ORD SLCTV ABDL PEL/LXTR BRNCH Left 2023    Procedure: Left leg angiogram with intervention;  Surgeon: Michelle Galvan MD;  Location: [facility];  Service: Vascular    TX SLCTV CATHJ 3RD+ ORD SLCTV ABDL PEL/LXTR BRNCH Left 2024    Procedure: Left leg angiogram antegrade access, left popliteal angioplasty;  Surgeon: Michelle Galvan MD;  Location: [facility];  Service: Vascular    ROTATOR CUFF REPAIR Right     SPINAL CORD STIMULATOR IMPLANT      \"Medtronic interstim model # 3058- in lower back to control bowel movements-currently turned off-battery is dead\"    TOE AMPUTATION Right 10/28/2016    Procedure: 3RD TOE AMPUTATION ;  Surgeon: Anjel Salas DPM;  Location: [facility];  Service:         Pain: FLACC 0    Objective    Upper Extremities:  Pt is right hand dominant                MARIFER: RUE: 30lb(previously 43lb) LUE: 12lb (previously 26lb)  The age norm is approximately 76-89 lbs, indicating decreased  strength.                2 point pinch: R UE: 7 L UE: 1 (previously RUE: 8.5, LUE: 1.5)  3 point pinch: R UE: 5 L UE: 1 (previously RUE: 7, LUE: 1.5)  Lateral pinch: R UE: 9 L UE: 3 (previously RUE: 12, LUE: 2.5)                Range of Motion:   AROM: b/l WNL     Subluxation: none    Scapular winging: none    Spasticity: none     Finger to Nose Testing with Visual Occlusion (proprioception):  mildly dysmetric L UE    Finger to Nose Testing without Visual Occlusion: WNL     Monofilaments/sensory testing: WNL    Functional Cognition:  Highest level of education: High school    [place] Cognitive Assessment Version 8.3 (MoCA V8.3)   Visuospatial/executive functionin/5  Namin/3  Memory: 1st trial:  /5, 2nd trial:  /5  Attention/concentration: /  List of letters: 0/1, 3 errors  Serial Seven Subtraction:  1/3   Language/sentence repetition:  2  Language Fluency:  0/1, 7 words with encouragement to " continue  Abstract/Correlational Thinkin/2  Delayed Recall:  0/5  Orientation:  2/6. Oriented to day and city.     Memory Index Score: 5/15     Raw Score:  9+1= 10/30, MIS:  5/15, indicative of MODERATE neurocognitive impairments. Significant decline from last re-eval, during which he scored 12/30, and previously was scoring 16/30    MoCA Scoring        Normal: 26+         Mild Cognitive Impairment: 18-25          Moderate Cognitive Impairment: 10-17         Severe Cognitive Impairment: <10      NINE-HOLE PEG TEST: assesses dexterity/fine motor coordination. Alternative scoring: the number of pegs placed in 50 or 100 seconds can be recorded.     R hand: 1:13 to insert 2 pegs with multiples dropped.  Discontinued 2* pt frustration (previously 1:54 with 4 pegs dropped, 3x use of L UE despite cues to discontinue)  L hand: 26 seconds to insert 0 pegs with multiple dropped.  Discontinued 2* pt frustration. (previously 3:06 with 11 pegs dropped, 4x use of R UE despite cues to discontinue)     Norms:   Sex Age Average R Average L   Male 61-65 20.87 21.60      Pt demonstrates decreased FMC compared to norms for age/sex       FUNCTIONAL DEXTERITY TEST:   R: 2:00 with 16/16 compensations (previously 1:24 with 16/16 compensations)   L: 1:53 to complete 4 pegs with 4/4 compensations then pt refuses to continue (previously 1:42 with 3 pegs dropped, 16/16 compensations)     Trail making Part A and Part B:   Part A: NA (in February 2:33 with 6 cues for sequencing. Prior: 1:28 with 1 mistake corrected by the therapist)   Part B: NA 2* being unable to complete smaller version on MoCA    Indicating deficits: Part A deficit > 33.22 seconds for age related norms    Contextual Memory Test: Not assessed 25    Self care:   -Doffs socks and shoes in tailor sit with significantly inc time and encouragement, no physical assist   -Requires assist to thread toes into R sock after 3 trials with VC 2* pt only pulling it around digits  "1-3.  Pt then able to pull up the rest of the way and once he finishes screams \"I CAN'T F*CKING DO IT. I CAN'T F*CKING DO IT. LEAVE ME THE F*CK ALONE I JUST WANT TO DIE\". Then \"give me that f*cking sock\"  After cues for deep breathing and few min, pt returns to baseline mood and reports he does not have thoughts of hurting himself, he just felt frustrated.  Able to then don L sock and b/l shoes with set-up.    GOALS:   Short Term Goals:  Pt will increase B UE  strength by 3 lbs to improve performance with ADLs   Pt will increase b/l pinch strengths by at least 1lb for carry over with ADLs.  Pt will inc independence with donning/doffing pants to modA per caregiver report.  Pt will demonstrate improved functional cognition as evidenced by improving score on the MoCA to 12/30 to improve performance during ADLs and IADLs   Pt will demonstrate improved dexterity by completing Functional Dexterity Test scoring less than 180 R UE and completing entire assessment with L UE (not time constraint)    Long Term Goals: 8-12 weeks  Pt will increase B UE  strength by 6 lbs to improve performance with ADLs   Pt will increase b/l pinch strengths by at least 2lb for carry over with ADLs.  Pt will inc independence with donning/doffing b/l socks and shoes to set-up.  Pt will inc independence with donning/doffing pants to Roni per caregiver report.  Pt will demonstrate improved functional cognition as evidenced by improving score on the MoCA to 14/30 to improve performance during ADLs and IADLs   Pt will demonstrate improved FMC by completing Nine-Hole Peg Test b/l UE.  Pt will demonstrate improved dexterity by completing Functional Dexterity Test scoring less than 160 R UE and completing entire assessment with L UE within 5 min.    OTHER PLANNED THERAPY INTERVENTIONS:   Supine, seated, and in stance neuro re-ed  Tricep AG  NMES/FES  FMC/prehension  Timed Trials  Manual tx  Hand to target  Sensory re-ed  Seated functional reach: " crossing midline  Supine place and hold  WBearing strategies   Closed chain activities  Open chain activities  Internal and external memory aides  Multimatrix for saccades/ visual clutter/attention  Hypersensitivity strategies education  Multi-modal environment  Sustained/alternating/divided attention  Work stations with timed transitions  Temporal Awareness  Memory and mental manipulation  Auditory processing with immediate recall  Memory retention with immediate and delayed recall  Edu on cog/vision apps

## 2025-04-08 ENCOUNTER — OFFICE VISIT (OUTPATIENT)
Dept: HEMATOLOGY ONCOLOGY | Facility: MEDICAL CENTER | Age: 65
End: 2025-04-08
Payer: MEDICARE

## 2025-04-08 ENCOUNTER — EVALUATION (OUTPATIENT)
Facility: CLINIC | Age: 65
End: 2025-04-08
Payer: MEDICARE

## 2025-04-08 ENCOUNTER — OFFICE VISIT (OUTPATIENT)
Facility: CLINIC | Age: 65
End: 2025-04-08
Payer: MEDICARE

## 2025-04-08 VITALS
RESPIRATION RATE: 17 BRPM | HEIGHT: 69 IN | TEMPERATURE: 97.7 F | OXYGEN SATURATION: 100 % | SYSTOLIC BLOOD PRESSURE: 98 MMHG | HEART RATE: 81 BPM | BODY MASS INDEX: 31.6 KG/M2 | DIASTOLIC BLOOD PRESSURE: 56 MMHG

## 2025-04-08 DIAGNOSIS — R53.1 GENERALIZED WEAKNESS: ICD-10-CM

## 2025-04-08 DIAGNOSIS — Z86.73 H/O: STROKE: Primary | ICD-10-CM

## 2025-04-08 DIAGNOSIS — F39 MOOD DISORDER (HCC): ICD-10-CM

## 2025-04-08 DIAGNOSIS — R41.89 COGNITIVE DEFICITS: ICD-10-CM

## 2025-04-08 DIAGNOSIS — R26.2 AMBULATORY DYSFUNCTION: Primary | ICD-10-CM

## 2025-04-08 DIAGNOSIS — Z78.9 DECREASED ACTIVITIES OF DAILY LIVING (ADL): ICD-10-CM

## 2025-04-08 DIAGNOSIS — Z86.718 HISTORY OF DVT (DEEP VEIN THROMBOSIS): Primary | ICD-10-CM

## 2025-04-08 DIAGNOSIS — K74.69 OTHER CIRRHOSIS OF LIVER (HCC): ICD-10-CM

## 2025-04-08 DIAGNOSIS — R45.86 EMOTIONAL LABILITY: ICD-10-CM

## 2025-04-08 DIAGNOSIS — R60.0 LOCALIZED EDEMA: ICD-10-CM

## 2025-04-08 PROCEDURE — 97530 THERAPEUTIC ACTIVITIES: CPT | Performed by: PHYSICAL THERAPIST

## 2025-04-08 PROCEDURE — 97166 OT EVAL MOD COMPLEX 45 MIN: CPT

## 2025-04-08 PROCEDURE — 99215 OFFICE O/P EST HI 40 MIN: CPT | Performed by: PHYSICIAN ASSISTANT

## 2025-04-08 NOTE — PROGRESS NOTES
Name: Asad Galloway      : 1960      MRN: [id number]  Encounter Provider: Dayanara Holbrook PA-C  Encounter Date: 2025   Encounter department: [facility]  :  60 minutes was spent in direct patient care and counseling.  Assessment & Plan  History of DVT (deep vein thrombosis)  Patient presents for follow-up of DVT. He has a very complicated past medical history.     He has end-stage renal disease on dialysis, congestive heart failure.  History of CVA in 2018.  History of cardiac stents placed in .  History of peripheral arterial disease.  He is currently on Effient along with aspirin 81 mg daily.     He had venous duplex on 2024 with finding of DVT in one of the paired peroneal veins at the proximal cath in the right lower extremity.  He suffered a fall about 1 week prior to this in which he bruised his right ankle and was minimally mobile for a few days. He was placed on Eliquis at that time.  This was his first episode of VTE.     It seems likely that DVT was provoked by fall and subsequent lack of mobility.  The Eliquis was ultimately discontinued by his vascular physician after 4 months time.  Eliquis was stopped in 2024.    Patient was recently admitted at [facility].  During his hospitalization he had a central line placed in his left neck. On 3/12/2025 he was found to have a nonocclusive thrombus in the left IJ.  He was not placed on anticoagulation.      He had repeat venous duplex of the left upper extremity on 3/30/2025. In comparison to the study of 3/12/2025 at an outside facility, there is no significant change on the left and there is superficial thrombus noted in the right cephalic vein at the elbow.    Patient is aware that he should be on anticoagulation for the DVT.  He is obviously very high risk for bleeding as he is also on dual antiplatelet therapy.  Ideally the aspirin or the Effient should be held while patient is  on Eliquis.  We asked patient's son to reach out to cardiology regarding same.  Patient's son is confident that his cardiologist will not allow either of the antiplatelet agents to be held.    The patient and his family are aware that this is a difficult decision as the DVT could propagate without anticoagulation; however, on Eliquis and dual antiplatelet therapy he is very high risk for bleeding.  The patient will start Eliquis 2.5 mg twice daily.  The patient and his family are more comfortable with being placed on the anticoagulation.  They understand it is of the utmost importance that he does not fall or suffer any trauma.      We will repeat CBC weekly for now.  For any decline in his hemoglobin the Eliquis will need to be stopped.    We will repeat a venous duplex in 6 weeks time.  Patient will see Dr. Bourne thereafter.    We also recommend that patient obtain another hematology opinion at [facility].    Patient also seen by Dr. Bourne.    Orders:    CBC and differential; Standing    apixaban (Eliquis) 2.5 mg; Take 1 tablet (2.5 mg total) by mouth 2 (two) times a day    VAS upper limb venous duplex scan, complete, bilateral; Future    Other cirrhosis of liver (HCC)  Cirrhosis/splenomegaly is likely cause for mild thrombocytopenia.  Patient will be referred to GI for surveillance of the cirrhosis.  We will defer any additional workup of the thrombocytopenia for now.  Orders:    Ambulatory referral to Gastroenterology; Future    History of Present Illness   Chief Complaint   Patient presents with    Follow-up   History of present illness:   Patient presents for follow-up of DVT.     His past medical history is significant for end-stage renal disease on dialysis, congestive heart failure, anemia, hyperlipidemia, type 2 diabetes (no longer on any medications). He has history of CVA in July 2018. He also had stents placed in 2022. He is on Effient and aspirin. His son says he is resistant to plavix.      He has been on dialysis since 2020.  He was on the kidney transplant list at [facility] up until recently.     He had a recent hospitalization at [facility] from 7/14/2024 through 7/16/2024 for cough, sore throat, fatigue and swollen right leg.     Venous duplex was significant for DVT in one of the paired peroneal veins at the proximal calf.  He was started on heparin drip and then transitioned to Eliquis on discharge.  He remained on Eliquis for around 4 months time.     He denies any prior history of VTE.  No family history of VTE.    He was recently admitted at [place] on 2/27/2025 for right and left heart cath.  He became hypotensive.  He was admitted to ICU requiring pressors and was started on CRRT.  His son said that he had a central line placed in the left neck.  On 3/12/2025 he was found to have a nonocclusive thrombus in the left IJ.  He was not placed on anticoagulation.      He had repeat venous duplex of the left upper extremity on 3/30/2025. In comparison to the study of 3/12/2025 at an outside facility, there is no significant change on the left and there is superficial thrombus noted in the right cephalic vein at the elbow.    He tells me that he plans to follow-up with his interventional cardiologist for TAVR at [facility] as outpatient.    He is here today with his son, Sam, and wife.  He is participating in physical therapy.  No recent falls.  No obvious bleeding or excessive bruising.  He tells me that he is off of the kidney transplant list for now.     Review of Systems   Constitutional:  Positive for fatigue. Negative for activity change, appetite change and fever.   HENT:  Negative for nosebleeds.    Respiratory:  Negative for cough, choking and shortness of breath.    Cardiovascular:  Negative for chest pain, palpitations and leg swelling.   Gastrointestinal:  Negative for abdominal distention, abdominal pain, anal bleeding, blood in stool, constipation, diarrhea, nausea  "and vomiting.   Endocrine: Negative for cold intolerance.   Genitourinary:  Negative for hematuria.   Musculoskeletal:  Positive for arthralgias. Negative for myalgias.   Skin:  Negative for color change, pallor and rash.   Allergic/Immunologic: Negative for immunocompromised state.   Neurological:  Negative for headaches.   Hematological:  Negative for adenopathy. Does not bruise/bleed easily.   All other systems reviewed and are negative.        Objective   BP 98/56 (BP Location: Right arm, Patient Position: Sitting, Cuff Size: Adult)   Pulse 81   Temp 97.7 °F (36.5 °C) (Temporal)   Resp 17   Ht 5' 9\" (1.753 m)   SpO2 100%   BMI 31.60 kg/m²     Physical Exam  Constitutional:       General: He is not in acute distress.       Appearance: Normal appearance. He is well-developed. He is chronically ill-appearing.  HENT:      Head: Normocephalic and atraumatic.      Right Ear: External ear normal.      Left Ear: External ear normal.      Nose: Nose normal.   Eyes:      General: No scleral icterus.     Conjunctiva/sclera: Conjunctivae normal.   Cardiovascular:      Rate and Rhythm: Normal rate and regular rhythm.   Pulmonary:      Effort: Pulmonary effort is normal. No respiratory distress.      Breath sounds: Normal breath sounds.   Abdominal:      General: Abdomen is flat. There is no distension.      Palpations: Abdomen is soft.      Tenderness: There is no abdominal tenderness.   Skin:     Findings: No rash (on exposed skin.).   Neurological:      Gait: Abnormal.     Mental Status: He is alert and oriented to person, place, and time.   Psychiatric:         Mood and Affect: Mood normal.         Thought Content: Thought content normal.         Judgment: Judgment normal.        "

## 2025-04-08 NOTE — PROGRESS NOTES
Daily Note     Today's date: 2025  Patient name: Asad Galloway  : 1960  MRN: [id number]  Referring provider: Raj Auguste DO  Dx:   Encounter Diagnosis     ICD-10-CM    1. Ambulatory dysfunction  R26.2       2. Generalized weakness  R53.1       3. Mood disorder (HCC)  F39       4. Emotional lability  R45.86                 DO NOT OFFER WATER DUE TO FLUID RESTRICTION (dialysis)       Subjective: Pt with no new complaints. Arrives with RW and his wife.      Objective: See treatment diary below    - Nustep L2 x 4 min (no rest breaks, able to perform consecutively)     - Sit to stands from edge of elevated mat table, and slightly lowering after each set: 5 reps, 4 sets (ranging from S to min A)    - Ambulation with SPC 25 ft x 3, min A    Circuit (performed 3x):  - LAQ 10x each leg  - Ambulation with RW around clinic 90 ft, CG        Assessment: Pt tolerated treatment well with focus on functional mobility. Pt able to perform sit>stands at S level from elevated mat table, but required min A once it was lowered down (by last 2 sets). He responded better to vc's to push from mat table instead of from RW during sit>stands. Attempted to perform mini squats during circuit above but pt declined due to c/o back pain after sit>stands, so performed LAQ instead. Practiced ambulating with SPC for further balance and mobility training, but continue to recommend use of RW for all mobility at home. Patient would benefit from continued PT to focus on gait and transfers, with progression to more balance training.      Plan: Continue per plan of care.  Pt's wife inquiring about rollator; practice with pt next visit to assess for safety.    POC expires Unit limit Auth Expiration date PT/OT + Visit Limit? Co-Insurance      [facility] primary, 30pcy secondary Yes                                            Outcome Measures Initial Eval  24 Re-eval  10/10/24 Re eval   24 Re-eval  25 Re-eval  25    5xSTS 15.85 sec, 2  UE  15.41 sec, 2 UE  21.06 sec, pushing off thighs  46.97 sec pushing off thighs + mod A Only performed 3 reps, 25.63 sec    TUG  - Regular  - Cognitive   17.3 sec, no AD  24.3 sec, (serial 3s)   12.59 sec, no AD  18.56 sec, no AD (serial 3s)   16. 44 sec, pushing off thighs  19.74 sec, serial 3s   22.11 sec with RW    27.53 sec with RW, serial 3s   24.52 sec with RW    10 meter   1.18 m/s without AD   0.86 m/s with cane   0.71 m/s wo cane         BURGER 30/56 33/56 34/56 14/56 7/56    mCTSIB  - FTEO (firm)  - FTEC (firm)  - FTEO (foam)  - FTEC (foam)   30 sec +  30 ++  2 sec  0 sec   30 sec+  30 sec+  3 sec  0 sec   30 sec+  30 sec+  3 sec   0 sec      6MWT 100 ft (attempted but only able to complete 1 minute) 275 ft in 4 minutes  212 ft in 2 minutes w no cane   2MWT: 84 ft with RW and CG    ABC 47.5% 81.88% 72.5%

## 2025-04-09 ENCOUNTER — OFFICE VISIT (OUTPATIENT)
Dept: FAMILY MEDICINE CLINIC | Facility: CLINIC | Age: 65
End: 2025-04-09
Payer: MEDICARE

## 2025-04-09 VITALS
HEIGHT: 69 IN | TEMPERATURE: 98.6 F | HEART RATE: 49 BPM | OXYGEN SATURATION: 94 % | BODY MASS INDEX: 32.73 KG/M2 | DIASTOLIC BLOOD PRESSURE: 84 MMHG | SYSTOLIC BLOOD PRESSURE: 126 MMHG | WEIGHT: 221 LBS

## 2025-04-09 DIAGNOSIS — S81.802S WOUND OF LEFT LOWER EXTREMITY, SEQUELA: ICD-10-CM

## 2025-04-09 DIAGNOSIS — S41.102S: ICD-10-CM

## 2025-04-09 DIAGNOSIS — L03.012 PARONYCHIA OF FINGER OF LEFT HAND: Primary | ICD-10-CM

## 2025-04-09 DIAGNOSIS — E11.9 CONTROLLED TYPE 2 DIABETES MELLITUS WITHOUT COMPLICATION, WITHOUT LONG-TERM CURRENT USE OF INSULIN (HCC): ICD-10-CM

## 2025-04-09 PROCEDURE — G2211 COMPLEX E/M VISIT ADD ON: HCPCS

## 2025-04-09 PROCEDURE — 99214 OFFICE O/P EST MOD 30 MIN: CPT

## 2025-04-09 RX ORDER — DOXYCYCLINE 100 MG/1
100 CAPSULE ORAL EVERY 12 HOURS SCHEDULED
Qty: 20 CAPSULE | Refills: 0 | Status: ON HOLD | OUTPATIENT
Start: 2025-04-09 | End: 2025-04-19

## 2025-04-09 RX ORDER — CEPHALEXIN 500 MG/1
500 CAPSULE ORAL EVERY 12 HOURS SCHEDULED
Qty: 20 CAPSULE | Refills: 0 | Status: CANCELLED | OUTPATIENT
Start: 2025-04-09 | End: 2025-04-19

## 2025-04-09 RX ORDER — MUPIROCIN 20 MG/G
OINTMENT TOPICAL 2 TIMES DAILY
Qty: 30 G | Refills: 1 | Status: ON HOLD | OUTPATIENT
Start: 2025-04-09

## 2025-04-09 NOTE — PROGRESS NOTES
Name: Asad Galloway      : 1960      MRN: [id number]  Encounter Provider: BRITTANY Allen  Encounter Date: 2025   Encounter department: [facility]  :  Assessment & Plan  Paronychia of finger of left hand  L hand 5th finger with erythema scabbed area, swelling, tenderness and mild drainage. Per spouse was applying mupirocin ointment no longer has medication. Refill for mupirocin sent to pharmacy, pt started on oral abx doxycycline 100 mg bid x 10 days.    Orders:  •  mupirocin (BACTROBAN) 2 % ointment; Apply topically 2 (two) times a day  •  doxycycline hyclate (VIBRAMYCIN) 100 mg capsule; Take 1 capsule (100 mg total) by mouth every 12 (twelve) hours for 10 days    Controlled type 2 diabetes mellitus without complication, without long-term current use of insulin ([facility])    Lab Results   Component Value Date    HGBA1C 5.5 2024    Hgb A1C 5.3 on 25 pt saw endocrinology 25 was advised to  f/u  with PCP given controlled BS.          Non-healing wound of upper extremity, left, sequela  L 5th finger scabbed wound with eschar, swelling, erythema and tenderness. Spouse reports wound has been present for almost a year, denies fever, pt's diabetes is controlled however ESRD on dialysis. Will obtain imaging to  r/o osteomyelitis., start doxycycline 100 mg bid x 10 days, apply mupirocin ointment bid prn.   Orders:  •  XR finger left fifth digit-pinkie; Future  •  doxycycline hyclate (VIBRAMYCIN) 100 mg capsule; Take 1 capsule (100 mg total) by mouth every 12 (twelve) hours for 10 days      Wound of left lower extremity, sequela  S/p fall L lower extremity wound scabbed over with mild erythema. Pt denies pain, educate leave MIRNA and mupirocin ointment bid prn.             Falls Plan of Care: referral to physical therapy and balance, strength, and gait training instructions were provided. Recommended assistive device to help with gait and balance. Home safety  "evaluation by OT recommended. Home safety education provided.       History of Present Illness   Pt and spouse presents for follow-up, provider contacted by Occupational therapist regarding eschar on L 5th finger with swelling and tenderness. Spouse reports wound has been present for several months however not healing, had pus coming from area a few days ago, denies fever or confusion. Pt was seen by wound care while hospitalized 3/21-3/24 unclear Etiology and recommend 3M no sting barrier film daily. Spouse states has been using however medication ran out, finished oral cephalexin 500 mg that was prescribed x 3 days post discharge. Noticed worsening swelling some purulent drainage however no fever.         Review of Systems   Constitutional:  Negative for chills, fatigue and fever.   Eyes:  Negative for visual disturbance.   Respiratory:  Positive for cough. Negative for chest tightness and shortness of breath.    Cardiovascular:  Negative for chest pain.   Gastrointestinal:  Negative for abdominal pain, nausea and vomiting.   Musculoskeletal:  Positive for gait problem. Negative for arthralgias, joint swelling and neck stiffness.   Skin:  Positive for color change and wound.   Neurological:  Negative for dizziness and headaches.   Psychiatric/Behavioral:  Negative for agitation and decreased concentration.        Objective   /84 (BP Location: Right arm, Patient Position: Sitting, Cuff Size: Standard)   Pulse (!) 49   Temp 98.6 °F (37 °C) (Tympanic)   Ht 5' 9\" (1.753 m)   Wt 100 kg (221 lb)   SpO2 94%   BMI 32.64 kg/m²      Physical Exam  Vitals and nursing note reviewed.   Constitutional:       General: He is not in acute distress.     Appearance: Normal appearance. He is well-developed. He is not ill-appearing, toxic-appearing or diaphoretic.   HENT:      Head: Normocephalic and atraumatic.      Mouth/Throat:      Mouth: Mucous membranes are moist.      Pharynx: Oropharynx is clear.   Eyes:      " Extraocular Movements: Extraocular movements intact.      Conjunctiva/sclera: Conjunctivae normal.      Pupils: Pupils are equal, round, and reactive to light.   Cardiovascular:      Rate and Rhythm: Normal rate and regular rhythm.      Pulses: Normal pulses.      Heart sounds: Normal heart sounds. No murmur heard.  Pulmonary:      Effort: Pulmonary effort is normal. No respiratory distress.      Breath sounds: Normal breath sounds.   Abdominal:      Palpations: Abdomen is soft.      Tenderness: There is no abdominal tenderness.   Musculoskeletal:         General: Swelling and tenderness present. No deformity.      Cervical back: Normal range of motion and neck supple.      Right lower leg: No edema.      Left lower leg: No edema.   Skin:     General: Skin is warm and dry.      Capillary Refill: Capillary refill takes less than 2 seconds.      Findings: Erythema and wound present.      Comments: LLE scabbed over wound with mild erythema, no swelling, drainage or tenderness    L 5th finger wound with eschar, swelling, erythema and tenderness. Scant drainage   Neurological:      Mental Status: He is alert.      Motor: Weakness present.      Gait: Gait abnormal.   Psychiatric:         Attention and Perception: Attention normal. He is attentive.         Mood and Affect: Mood normal.         Behavior: Behavior normal. Behavior is not agitated. Behavior is cooperative.         Thought Content: Thought content is not paranoid or delusional. Thought content does not include homicidal or suicidal ideation. Thought content does not include homicidal or suicidal plan.

## 2025-04-10 ENCOUNTER — APPOINTMENT (OUTPATIENT)
Facility: CLINIC | Age: 65
End: 2025-04-10
Payer: MEDICARE

## 2025-04-10 ENCOUNTER — APPOINTMENT (OUTPATIENT)
Dept: LAB | Facility: CLINIC | Age: 65
End: 2025-04-10
Payer: MEDICARE

## 2025-04-10 ENCOUNTER — APPOINTMENT (OUTPATIENT)
Dept: RADIOLOGY | Facility: CLINIC | Age: 65
End: 2025-04-10
Payer: MEDICARE

## 2025-04-10 DIAGNOSIS — Z86.718 HISTORY OF DVT (DEEP VEIN THROMBOSIS): ICD-10-CM

## 2025-04-10 DIAGNOSIS — S41.102S: ICD-10-CM

## 2025-04-10 LAB
BASOPHILS # BLD AUTO: 0.05 THOUSANDS/ÂΜL (ref 0–0.1)
BASOPHILS NFR BLD AUTO: 1 % (ref 0–1)
EOSINOPHIL # BLD AUTO: 0.19 THOUSAND/ÂΜL (ref 0–0.61)
EOSINOPHIL NFR BLD AUTO: 3 % (ref 0–6)
ERYTHROCYTE [DISTWIDTH] IN BLOOD BY AUTOMATED COUNT: 17.4 % (ref 11.6–15.1)
EST. AVERAGE GLUCOSE BLD GHB EST-MCNC: 108 MG/DL
HBA1C MFR BLD: 5.4 %
HCT VFR BLD AUTO: 34.9 % (ref 36.5–49.3)
HGB BLD-MCNC: 10.6 G/DL (ref 12–17)
IMM GRANULOCYTES # BLD AUTO: 0.02 THOUSAND/UL (ref 0–0.2)
IMM GRANULOCYTES NFR BLD AUTO: 0 % (ref 0–2)
LYMPHOCYTES # BLD AUTO: 1.13 THOUSANDS/ÂΜL (ref 0.6–4.47)
LYMPHOCYTES NFR BLD AUTO: 19 % (ref 14–44)
MCH RBC QN AUTO: 32.3 PG (ref 26.8–34.3)
MCHC RBC AUTO-ENTMCNC: 30.4 G/DL (ref 31.4–37.4)
MCV RBC AUTO: 106 FL (ref 82–98)
MONOCYTES # BLD AUTO: 0.68 THOUSAND/ÂΜL (ref 0.17–1.22)
MONOCYTES NFR BLD AUTO: 12 % (ref 4–12)
NEUTROPHILS # BLD AUTO: 3.74 THOUSANDS/ÂΜL (ref 1.85–7.62)
NEUTS SEG NFR BLD AUTO: 65 % (ref 43–75)
NRBC BLD AUTO-RTO: 0 /100 WBCS
PLATELET # BLD AUTO: 88 THOUSANDS/UL (ref 149–390)
PMV BLD AUTO: 12.4 FL (ref 8.9–12.7)
PSA SERPL-MCNC: 1.9 NG/ML (ref 0–4)
RBC # BLD AUTO: 3.28 MILLION/UL (ref 3.88–5.62)
WBC # BLD AUTO: 5.81 THOUSAND/UL (ref 4.31–10.16)

## 2025-04-10 PROCEDURE — 73140 X-RAY EXAM OF FINGER(S): CPT

## 2025-04-10 PROCEDURE — 83036 HEMOGLOBIN GLYCOSYLATED A1C: CPT

## 2025-04-10 PROCEDURE — 82985 ASSAY OF GLYCATED PROTEIN: CPT

## 2025-04-10 PROCEDURE — 85025 COMPLETE CBC W/AUTO DIFF WBC: CPT

## 2025-04-11 ENCOUNTER — RESULTS FOLLOW-UP (OUTPATIENT)
Dept: FAMILY MEDICINE CLINIC | Facility: CLINIC | Age: 65
End: 2025-04-11

## 2025-04-11 LAB — FRUCTOSAMINE SERPL-SCNC: 372 UMOL/L (ref 0–285)

## 2025-04-11 NOTE — TELEPHONE ENCOUNTER
----- Message from BRITTANY Delacruz sent at 4/11/2025  8:10 AM EDT -----  Please inform spouse no indication of osteomyelitis. Continue antibiotic topical and oral and follow-up for worsening sxs.

## 2025-04-15 ENCOUNTER — APPOINTMENT (OUTPATIENT)
Facility: CLINIC | Age: 65
End: 2025-04-15
Payer: MEDICARE

## 2025-04-17 ENCOUNTER — APPOINTMENT (EMERGENCY)
Dept: NON INVASIVE DIAGNOSTICS | Facility: HOSPITAL | Age: 65
DRG: 314 | End: 2025-04-17
Payer: MEDICARE

## 2025-04-17 ENCOUNTER — APPOINTMENT (EMERGENCY)
Dept: RADIOLOGY | Facility: HOSPITAL | Age: 65
DRG: 314 | End: 2025-04-17
Payer: MEDICARE

## 2025-04-17 ENCOUNTER — APPOINTMENT (OUTPATIENT)
Dept: RADIOLOGY | Facility: CLINIC | Age: 65
End: 2025-04-17
Attending: NURSE PRACTITIONER
Payer: MEDICARE

## 2025-04-17 ENCOUNTER — APPOINTMENT (EMERGENCY)
Dept: CT IMAGING | Facility: HOSPITAL | Age: 65
DRG: 314 | End: 2025-04-17
Payer: MEDICARE

## 2025-04-17 ENCOUNTER — OFFICE VISIT (OUTPATIENT)
Dept: URGENT CARE | Facility: CLINIC | Age: 65
End: 2025-04-17
Payer: MEDICARE

## 2025-04-17 ENCOUNTER — APPOINTMENT (EMERGENCY)
Dept: NON INVASIVE DIAGNOSTICS | Facility: CLINIC | Age: 65
DRG: 314 | End: 2025-04-17
Payer: MEDICARE

## 2025-04-17 ENCOUNTER — APPOINTMENT (OUTPATIENT)
Dept: LAB | Facility: CLINIC | Age: 65
End: 2025-04-17
Payer: MEDICARE

## 2025-04-17 ENCOUNTER — HOSPITAL ENCOUNTER (INPATIENT)
Facility: HOSPITAL | Age: 65
LOS: 7 days | Discharge: HOME HEALTH | DRG: 314 | End: 2025-04-24
Attending: SURGERY | Admitting: STUDENT IN AN ORGANIZED HEALTH CARE EDUCATION/TRAINING PROGRAM
Payer: MEDICARE

## 2025-04-17 VITALS
OXYGEN SATURATION: 94 % | RESPIRATION RATE: 18 BRPM | DIASTOLIC BLOOD PRESSURE: 70 MMHG | HEART RATE: 83 BPM | TEMPERATURE: 97 F | SYSTOLIC BLOOD PRESSURE: 110 MMHG

## 2025-04-17 DIAGNOSIS — Z99.2 ESRD ON DIALYSIS (HCC): ICD-10-CM
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
## 2025-04-17 DIAGNOSIS — J96.01 ACUTE RESPIRATORY FAILURE WITH HYPOXIA (HCC): ICD-10-CM

## 2025-04-17 DIAGNOSIS — Z86.718 HISTORY OF DVT (DEEP VEIN THROMBOSIS): ICD-10-CM

## 2025-04-17 DIAGNOSIS — R05.1 ACUTE COUGH: Primary | ICD-10-CM

## 2025-04-17 DIAGNOSIS — I95.9 HYPOTENSION, UNSPECIFIED HYPOTENSION TYPE: ICD-10-CM

## 2025-04-17 DIAGNOSIS — I25.5 ISCHEMIC CARDIOMYOPATHY: ICD-10-CM

## 2025-04-17 DIAGNOSIS — R57.9 SHOCK (HCC): ICD-10-CM

## 2025-04-17 DIAGNOSIS — S01.21XA LACERATION OF NOSE, INITIAL ENCOUNTER: ICD-10-CM

## 2025-04-17 DIAGNOSIS — I50.20 HFREF (HEART FAILURE WITH REDUCED EJECTION FRACTION) (HCC): ICD-10-CM

## 2025-04-17 DIAGNOSIS — N18.6 ESRD ON DIALYSIS (HCC): ICD-10-CM

## 2025-04-17 DIAGNOSIS — Z98.890 REQUIRED EMERGENCY INTUBATION: ICD-10-CM

## 2025-04-17 DIAGNOSIS — E53.8 VITAMIN B12 DEFICIENCY: ICD-10-CM

## 2025-04-17 PROBLEM — K74.60 CIRRHOSIS (HCC): Status: ACTIVE | Noted: 2025-04-17

## 2025-04-17 LAB
2HR DELTA HS TROPONIN: -13 NG/L
4HR DELTA HS TROPONIN: -23 NG/L
ABO GROUP BLD BPU: NORMAL
ABO GROUP BLD: NORMAL
ALBUMIN SERPL BCG-MCNC: 3.8 G/DL (ref 3.5–5)
ALP SERPL-CCNC: 126 U/L (ref 34–104)
ALT SERPL W P-5'-P-CCNC: 9 U/L (ref 7–52)
AMMONIA PLAS-SCNC: 25 UMOL/L (ref 18–72)
ANION GAP SERPL CALCULATED.3IONS-SCNC: 10 MMOL/L (ref 4–13)
AORTIC ROOT: 3.6 CM
AORTIC VALVE ANNULUS: 2.6 CM
AORTIC VALVE MEAN VELOCITY: 19.3 M/S
APTT PPP: 38 SECONDS (ref 23–34)
ASCENDING AORTA: 4.1 CM
AST SERPL W P-5'-P-CCNC: 13 U/L (ref 13–39)
AV AREA BY CONTINUOUS VTI: 1.6 CM2
AV AREA PEAK VELOCITY: 1.2 CM2
AV LVOT MEAN GRADIENT: 1 MMHG
AV LVOT PEAK GRADIENT: 2 MMHG
AV MEAN PRESS GRAD SYS DOP V1V2: 17 MMHG
AV ORIFICE AREA US: 1.59 CM2
AV PEAK GRADIENT: 29 MMHG
AV REGURGITATION PRESSURE HALF TIME: 336 MS
AV VELOCITY RATIO: 0.32
AV VMAX SYS DOP: 2.7 M/S
BASE EX.OXY STD BLDV CALC-SCNC: 52.7 % (ref 60–80)
BASE EXCESS BLDA CALC-SCNC: -3 MMOL/L (ref -2–3)
BASE EXCESS BLDA CALC-SCNC: 5 MMOL/L (ref -2–3)
BASE EXCESS BLDA CALC-SCNC: 7 MMOL/L (ref -2–3)
BASE EXCESS BLDV CALC-SCNC: 2.4 MMOL/L
BASOPHILS # BLD AUTO: 0.02 THOUSANDS/ÂΜL (ref 0–0.1)
BASOPHILS # BLD AUTO: 0.03 THOUSANDS/ÂΜL (ref 0–0.1)
BASOPHILS NFR BLD AUTO: 0 % (ref 0–1)
BASOPHILS NFR BLD AUTO: 1 % (ref 0–1)
BILIRUB SERPL-MCNC: 0.88 MG/DL (ref 0.2–1)
BLD GP AB SCN SERPL QL: NEGATIVE
BNP SERPL-MCNC: >4700 PG/ML (ref 0–100)
BPU ID: NORMAL
BSA FOR ECHO PROCEDURE: 2.18 M2
BUN SERPL-MCNC: 25 MG/DL (ref 5–25)
CA-I BLD-SCNC: 0.65 MMOL/L (ref 1.12–1.32)
CA-I BLD-SCNC: 0.85 MMOL/L (ref 1.12–1.32)
CA-I BLD-SCNC: 0.99 MMOL/L (ref 1.12–1.32)
CALCIUM SERPL-MCNC: 8 MG/DL (ref 8.4–10.2)
CARDIAC TROPONIN I PNL SERPL HS: 107 NG/L (ref ?–50)
CARDIAC TROPONIN I PNL SERPL HS: 117 NG/L (ref ?–50)
CARDIAC TROPONIN I PNL SERPL HS: 130 NG/L (ref ?–50)
CHLORIDE SERPL-SCNC: 96 MMOL/L (ref 96–108)
CO2 SERPL-SCNC: 32 MMOL/L (ref 21–32)
CREAT SERPL-MCNC: 4.62 MG/DL (ref 0.6–1.3)
DOP CALC AO VTI: 46.94 CM
DOP CALC LVOT AREA: 4.91 CM2
DOP CALC LVOT CARDIAC INDEX: 2.81 L/MIN/M2
DOP CALC LVOT CARDIAC OUTPUT: 6.13 L/MIN
DOP CALC LVOT DIAMETER: 2.5 CM
DOP CALC LVOT PEAK VEL VTI: 15.2 CM
DOP CALC LVOT PEAK VEL: 0.68 M/S
DOP CALC LVOT STROKE INDEX: 33 ML/M2
DOP CALC LVOT STROKE VOLUME: 72
DOP CALC MV VTI: 32.69 CM
E WAVE DECELERATION TIME: 119 MS
E/A RATIO: 3.6
EOSINOPHIL # BLD AUTO: 0.11 THOUSAND/ÂΜL (ref 0–0.61)
EOSINOPHIL # BLD AUTO: 0.11 THOUSAND/ÂΜL (ref 0–0.61)
EOSINOPHIL NFR BLD AUTO: 2 % (ref 0–6)
EOSINOPHIL NFR BLD AUTO: 2 % (ref 0–6)
ERYTHROCYTE [DISTWIDTH] IN BLOOD BY AUTOMATED COUNT: 17.6 % (ref 11.6–15.1)
ERYTHROCYTE [DISTWIDTH] IN BLOOD BY AUTOMATED COUNT: 17.7 % (ref 11.6–15.1)
FLUAV RNA RESP QL NAA+PROBE: NEGATIVE
FLUBV RNA RESP QL NAA+PROBE: NEGATIVE
FRACTIONAL SHORTENING: 29 (ref 28–44)
GFR SERPL CREATININE-BSD FRML MDRD: 12 ML/MIN/1.73SQ M
GLUCOSE SERPL-MCNC: 67 MG/DL (ref 65–140)
GLUCOSE SERPL-MCNC: 79 MG/DL (ref 65–140)
GLUCOSE SERPL-MCNC: 89 MG/DL (ref 65–140)
GLUCOSE SERPL-MCNC: 89 MG/DL (ref 65–140)
HCO3 BLDA-SCNC: 20.3 MMOL/L (ref 24–30)
HCO3 BLDA-SCNC: 30.2 MMOL/L (ref 24–30)
HCO3 BLDA-SCNC: 31.1 MMOL/L (ref 24–30)
HCO3 BLDV-SCNC: 27.7 MMOL/L (ref 24–30)
HCT VFR BLD AUTO: 31.3 % (ref 36.5–49.3)
HCT VFR BLD AUTO: 33.1 % (ref 36.5–49.3)
HCT VFR BLD CALC: 29 % (ref 36.5–49.3)
HCT VFR BLD CALC: 30 % (ref 36.5–49.3)
HCT VFR BLD CALC: 31 % (ref 36.5–49.3)
HGB BLD-MCNC: 10 G/DL (ref 12–17)
HGB BLD-MCNC: 9.5 G/DL (ref 12–17)
HGB BLDA-MCNC: 10.2 G/DL (ref 12–17)
HGB BLDA-MCNC: 10.5 G/DL (ref 12–17)
HGB BLDA-MCNC: 9.9 G/DL (ref 12–17)
IMM GRANULOCYTES # BLD AUTO: 0.03 THOUSAND/UL (ref 0–0.2)
IMM GRANULOCYTES # BLD AUTO: 0.05 THOUSAND/UL (ref 0–0.2)
IMM GRANULOCYTES NFR BLD AUTO: 1 % (ref 0–2)
IMM GRANULOCYTES NFR BLD AUTO: 1 % (ref 0–2)
INR PPP: 1.5 (ref 0.85–1.19)
INTERVENTRICULAR SEPTUM IN DIASTOLE (PARASTERNAL SHORT AXIS VIEW): 1.1 CM
INTERVENTRICULAR SEPTUM: 1.1 CM (ref 0.6–1.1)
LAAS-AP2: 24.4 CM2
LAAS-AP4: 14.3 CM2
LACTATE SERPL-SCNC: 1.4 MMOL/L (ref 0.5–2)
LEFT ATRIUM SIZE: 4.5 CM
LEFT ATRIUM VOLUME (MOD BIPLANE): 54 ML
LEFT ATRIUM VOLUME INDEX (MOD BIPLANE): 24.8 ML/M2
LEFT INTERNAL DIMENSION IN SYSTOLE: 4.4 CM (ref 2.1–4)
LEFT VENTRICLE DIASTOLIC VOLUME (MOD BIPLANE): 200 ML
LEFT VENTRICLE DIASTOLIC VOLUME INDEX (MOD BIPLANE): 91.7 ML/M2
LEFT VENTRICLE SYSTOLIC VOLUME (MOD BIPLANE): 160 ML
LEFT VENTRICLE SYSTOLIC VOLUME INDEX (MOD BIPLANE): 73.4 ML/M2
LEFT VENTRICULAR INTERNAL DIMENSION IN DIASTOLE: 6.2 CM (ref 3.5–6)
LEFT VENTRICULAR POSTERIOR WALL IN END DIASTOLE: 1.3 CM
LEFT VENTRICULAR STROKE VOLUME: 107 ML
LV EF BIPLANE MOD: 20 %
LV EF US.2D.A4C+ESTIMATED: 21 %
LVSV (TEICH): 107 ML
LYMPHOCYTES # BLD AUTO: 0.56 THOUSANDS/ÂΜL (ref 0.6–4.47)
LYMPHOCYTES # BLD AUTO: 0.63 THOUSANDS/ÂΜL (ref 0.6–4.47)
LYMPHOCYTES NFR BLD AUTO: 12 % (ref 14–44)
LYMPHOCYTES NFR BLD AUTO: 9 % (ref 14–44)
MCH RBC QN AUTO: 31.6 PG (ref 26.8–34.3)
MCH RBC QN AUTO: 32.1 PG (ref 26.8–34.3)
MCHC RBC AUTO-ENTMCNC: 30.2 G/DL (ref 31.4–37.4)
MCHC RBC AUTO-ENTMCNC: 30.4 G/DL (ref 31.4–37.4)
MCV RBC AUTO: 104 FL (ref 82–98)
MCV RBC AUTO: 106 FL (ref 82–98)
MONOCYTES # BLD AUTO: 0.63 THOUSAND/ÂΜL (ref 0.17–1.22)
MONOCYTES # BLD AUTO: 0.74 THOUSAND/ÂΜL (ref 0.17–1.22)
MONOCYTES NFR BLD AUTO: 12 % (ref 4–12)
MONOCYTES NFR BLD AUTO: 12 % (ref 4–12)
MV E'TISSUE VEL-LAT: 14 CM/S
MV E'TISSUE VEL-SEP: 6 CM/S
MV EROA: 0.4 CM2
MV MEAN GRADIENT: 3 MMHG
MV PEAK A VEL: 0.42 M/S
MV PEAK E VEL: 151 CM/S
MV PEAK GRADIENT: 11 MMHG
MV REGURGITANT VOLUME: 51 ML
MV STENOSIS PRESSURE HALF TIME: 34 MS
MV VALVE AREA BY CONTINUITY EQUATION: 2.28 CM2
MV VALVE AREA P 1/2 METHOD: 6.5
NEUTROPHILS # BLD AUTO: 3.65 THOUSANDS/ÂΜL (ref 1.85–7.62)
NEUTROPHILS # BLD AUTO: 4.6 THOUSANDS/ÂΜL (ref 1.85–7.62)
NEUTS SEG NFR BLD AUTO: 73 % (ref 43–75)
NEUTS SEG NFR BLD AUTO: 75 % (ref 43–75)
NRBC BLD AUTO-RTO: 0 /100 WBCS
NRBC BLD AUTO-RTO: 0 /100 WBCS
O2 CT BLDV-SCNC: 7.9 ML/DL
PCO2 BLD: 21 MMOL/L (ref 21–32)
PCO2 BLD: 30 MM HG (ref 42–50)
PCO2 BLD: 31 MMOL/L (ref 21–32)
PCO2 BLD: 33 MMOL/L (ref 21–32)
PCO2 BLD: 38.1 MM HG (ref 42–50)
PCO2 BLD: 50.7 MM HG (ref 42–50)
PCO2 BLDV: 46.1 MM HG (ref 42–50)
PH BLD: 7.4 [PH] (ref 7.3–7.4)
PH BLD: 7.44 [PH] (ref 7.3–7.4)
PH BLD: 7.51 [PH] (ref 7.3–7.4)
PH BLDV: 7.4 [PH] (ref 7.3–7.4)
PISA MRMAX VEL: 0.48 M/S
PISA RADIUS: 0.8 CM
PLATELET # BLD AUTO: 105 THOUSANDS/UL (ref 149–390)
PLATELET # BLD AUTO: 108 THOUSANDS/UL (ref 149–390)
PMV BLD AUTO: 11.9 FL (ref 8.9–12.7)
PMV BLD AUTO: 12.5 FL (ref 8.9–12.7)
PO2 BLD: 21 MM HG (ref 35–45)
PO2 BLD: 26 MM HG (ref 35–45)
PO2 BLD: 37 MM HG (ref 35–45)
PO2 BLDV: 30.8 MM HG (ref 35–45)
POTASSIUM BLD-SCNC: 4.1 MMOL/L (ref 3.5–5.3)
POTASSIUM BLD-SCNC: 4.3 MMOL/L (ref 3.5–5.3)
POTASSIUM BLD-SCNC: 4.6 MMOL/L (ref 3.5–5.3)
POTASSIUM SERPL-SCNC: 4.3 MMOL/L (ref 3.5–5.3)
PROT SERPL-MCNC: 6.7 G/DL (ref 6.4–8.4)
PROTHROMBIN TIME: 18.8 SECONDS (ref 12.3–15)
RA PRESSURE ESTIMATED: 15 MMHG
RBC # BLD AUTO: 3.01 MILLION/UL (ref 3.88–5.62)
RBC # BLD AUTO: 3.12 MILLION/UL (ref 3.88–5.62)
RH BLD: POSITIVE
RIGHT ATRIUM AREA SYSTOLE A4C: 33.4 CM2
RIGHT VENTRICLE ID DIMENSION: 8.1 CM
RSV RNA RESP QL NAA+PROBE: NEGATIVE
RV PSP: 67 MMHG
SAO2 % BLD FROM PO2: 34 % (ref 60–85)
SAO2 % BLD FROM PO2: 55 % (ref 60–85)
SAO2 % BLD FROM PO2: 74 % (ref 60–85)
SARS-COV-2 RNA RESP QL NAA+PROBE: NEGATIVE
SINOTUBULAR JUNCTION: 3.2 CM
SL CV AV DECELERATION TIME RETROGRADE: 1158 MS
SL CV AV PEAK GRADIENT RETROGRADE: 47 MMHG
SL CV LEFT ATRIUM LENGTH A2C: 6.4 CM
SL CV LV EF: 20
SL CV PED ECHO LEFT VENTRICLE DIASTOLIC VOLUME (MOD BIPLANE) 2D: 197 ML
SL CV PED ECHO LEFT VENTRICLE SYSTOLIC VOLUME (MOD BIPLANE) 2D: 90 ML
SL CV SINUS OF VALSALVA 2D: 3.8 CM
SODIUM BLD-SCNC: 136 MMOL/L (ref 136–145)
SODIUM BLD-SCNC: 137 MMOL/L (ref 136–145)
SODIUM BLD-SCNC: 140 MMOL/L (ref 136–145)
SODIUM SERPL-SCNC: 138 MMOL/L (ref 135–147)
SPECIMEN EXPIRATION DATE: NORMAL
SPECIMEN SOURCE: ABNORMAL
STJ: 3.2 CM
TR MAX PG: 52 MMHG
TR PEAK VELOCITY: 3.6 M/S
TRICUSPID ANNULAR PLANE SYSTOLIC EXCURSION: 1.1 CM
TRICUSPID VALVE PEAK REGURGITATION VELOCITY: 3.62 M/S
UNIT DISPENSE STATUS: NORMAL
UNIT PRODUCT CODE: NORMAL
UNIT PRODUCT VOLUME: 250 ML
UNIT PRODUCT VOLUME: 250 ML
UNIT PRODUCT VOLUME: 280 ML
UNIT PRODUCT VOLUME: 280 ML
UNIT PRODUCT VOLUME: 300 ML
UNIT PRODUCT VOLUME: 300 ML
UNIT PRODUCT VOLUME: 350 ML
UNIT PRODUCT VOLUME: 500 ML
UNIT RH: NORMAL
WBC # BLD AUTO: 5.07 THOUSAND/UL (ref 4.31–10.16)
WBC # BLD AUTO: 6.09 THOUSAND/UL (ref 4.31–10.16)

## 2025-04-17 PROCEDURE — 94150 VITAL CAPACITY TEST: CPT

## 2025-04-17 PROCEDURE — 31500 INSERT EMERGENCY AIRWAY: CPT | Performed by: EMERGENCY MEDICINE

## 2025-04-17 PROCEDURE — 71045 X-RAY EXAM CHEST 1 VIEW: CPT

## 2025-04-17 PROCEDURE — 94760 N-INVAS EAR/PLS OXIMETRY 1: CPT

## 2025-04-17 PROCEDURE — 96361 HYDRATE IV INFUSION ADD-ON: CPT

## 2025-04-17 PROCEDURE — NC001 PR NO CHARGE: Performed by: STUDENT IN AN ORGANIZED HEALTH CARE EDUCATION/TRAINING PROGRAM

## 2025-04-17 PROCEDURE — 85610 PROTHROMBIN TIME: CPT | Performed by: SURGERY

## 2025-04-17 PROCEDURE — G0463 HOSPITAL OUTPT CLINIC VISIT: HCPCS | Performed by: NURSE PRACTITIONER

## 2025-04-17 PROCEDURE — 86900 BLOOD TYPING SEROLOGIC ABO: CPT | Performed by: NURSE PRACTITIONER

## 2025-04-17 PROCEDURE — 93306 TTE W/DOPPLER COMPLETE: CPT | Performed by: INTERNAL MEDICINE

## 2025-04-17 PROCEDURE — 70450 CT HEAD/BRAIN W/O DYE: CPT

## 2025-04-17 PROCEDURE — 82330 ASSAY OF CALCIUM: CPT

## 2025-04-17 PROCEDURE — 83605 ASSAY OF LACTIC ACID: CPT | Performed by: SURGERY

## 2025-04-17 PROCEDURE — 72125 CT NECK SPINE W/O DYE: CPT

## 2025-04-17 PROCEDURE — NC001 PR NO CHARGE: Performed by: NURSE PRACTITIONER

## 2025-04-17 PROCEDURE — 0241U HB NFCT DS VIR RESP RNA 4 TRGT: CPT

## 2025-04-17 PROCEDURE — 85025 COMPLETE CBC W/AUTO DIFF WBC: CPT | Performed by: SURGERY

## 2025-04-17 PROCEDURE — 84132 ASSAY OF SERUM POTASSIUM: CPT

## 2025-04-17 PROCEDURE — 82140 ASSAY OF AMMONIA: CPT

## 2025-04-17 PROCEDURE — 99213 OFFICE O/P EST LOW 20 MIN: CPT | Performed by: NURSE PRACTITIONER

## 2025-04-17 PROCEDURE — 36620 INSERTION CATHETER ARTERY: CPT | Performed by: SURGERY

## 2025-04-17 PROCEDURE — 12051 INTMD RPR FACE/MM 2.5 CM/<: CPT | Performed by: INTERNAL MEDICINE

## 2025-04-17 PROCEDURE — 84484 ASSAY OF TROPONIN QUANT: CPT | Performed by: SURGERY

## 2025-04-17 PROCEDURE — 36415 COLL VENOUS BLD VENIPUNCTURE: CPT

## 2025-04-17 PROCEDURE — 82803 BLOOD GASES ANY COMBINATION: CPT

## 2025-04-17 PROCEDURE — 31500 INSERT EMERGENCY AIRWAY: CPT

## 2025-04-17 PROCEDURE — 4A133B1 MONITORING OF ARTERIAL PRESSURE, PERIPHERAL, PERCUTANEOUS APPROACH: ICD-10-PCS | Performed by: EMERGENCY MEDICINE

## 2025-04-17 PROCEDURE — 06HY33Z INSERTION OF INFUSION DEVICE INTO LOWER VEIN, PERCUTANEOUS APPROACH: ICD-10-PCS | Performed by: EMERGENCY MEDICINE

## 2025-04-17 PROCEDURE — 83880 ASSAY OF NATRIURETIC PEPTIDE: CPT | Performed by: SURGERY

## 2025-04-17 PROCEDURE — 71046 X-RAY EXAM CHEST 2 VIEWS: CPT

## 2025-04-17 PROCEDURE — 03HY32Z INSERTION OF MONITORING DEVICE INTO UPPER ARTERY, PERCUTANEOUS APPROACH: ICD-10-PCS | Performed by: EMERGENCY MEDICINE

## 2025-04-17 PROCEDURE — 99223 1ST HOSP IP/OBS HIGH 75: CPT | Performed by: INTERNAL MEDICINE

## 2025-04-17 PROCEDURE — 0BH17EZ INSERTION OF ENDOTRACHEAL AIRWAY INTO TRACHEA, VIA NATURAL OR ARTIFICIAL OPENING: ICD-10-PCS | Performed by: EMERGENCY MEDICINE

## 2025-04-17 PROCEDURE — 96365 THER/PROPH/DIAG IV INF INIT: CPT

## 2025-04-17 PROCEDURE — 80053 COMPREHEN METABOLIC PANEL: CPT | Performed by: SURGERY

## 2025-04-17 PROCEDURE — 87081 CULTURE SCREEN ONLY: CPT | Performed by: STUDENT IN AN ORGANIZED HEALTH CARE EDUCATION/TRAINING PROGRAM

## 2025-04-17 PROCEDURE — 84295 ASSAY OF SERUM SODIUM: CPT

## 2025-04-17 PROCEDURE — 71275 CT ANGIOGRAPHY CHEST: CPT

## 2025-04-17 PROCEDURE — 12020 TX SUPFC WND DEHSN SMPL CLSR: CPT | Performed by: INTERNAL MEDICINE

## 2025-04-17 PROCEDURE — 86901 BLOOD TYPING SEROLOGIC RH(D): CPT | Performed by: NURSE PRACTITIONER

## 2025-04-17 PROCEDURE — 30243H1 TRANSFUSION OF NONAUTOLOGOUS WHOLE BLOOD INTO CENTRAL VEIN, PERCUTANEOUS APPROACH: ICD-10-PCS | Performed by: EMERGENCY MEDICINE

## 2025-04-17 PROCEDURE — 0HQ1XZZ REPAIR FACE SKIN, EXTERNAL APPROACH: ICD-10-PCS

## 2025-04-17 PROCEDURE — 99291 CRITICAL CARE FIRST HOUR: CPT | Performed by: STUDENT IN AN ORGANIZED HEALTH CARE EDUCATION/TRAINING PROGRAM

## 2025-04-17 PROCEDURE — 96375 TX/PRO/DX INJ NEW DRUG ADDON: CPT

## 2025-04-17 PROCEDURE — 85025 COMPLETE CBC W/AUTO DIFF WBC: CPT

## 2025-04-17 PROCEDURE — C8929 TTE W OR WO FOL WCON,DOPPLER: HCPCS

## 2025-04-17 PROCEDURE — 70486 CT MAXILLOFACIAL W/O DYE: CPT

## 2025-04-17 PROCEDURE — 99285 EMERGENCY DEPT VISIT HI MDM: CPT

## 2025-04-17 PROCEDURE — 85730 THROMBOPLASTIN TIME PARTIAL: CPT | Performed by: SURGERY

## 2025-04-17 PROCEDURE — 82947 ASSAY GLUCOSE BLOOD QUANT: CPT

## 2025-04-17 PROCEDURE — 82805 BLOOD GASES W/O2 SATURATION: CPT | Performed by: SURGERY

## 2025-04-17 PROCEDURE — P9010 WHOLE BLOOD FOR TRANSFUSION: HCPCS

## 2025-04-17 PROCEDURE — 74177 CT ABD & PELVIS W/CONTRAST: CPT

## 2025-04-17 PROCEDURE — 96360 HYDRATION IV INFUSION INIT: CPT

## 2025-04-17 PROCEDURE — 94002 VENT MGMT INPAT INIT DAY: CPT

## 2025-04-17 PROCEDURE — 84484 ASSAY OF TROPONIN QUANT: CPT

## 2025-04-17 PROCEDURE — 86850 RBC ANTIBODY SCREEN: CPT | Performed by: NURSE PRACTITIONER

## 2025-04-17 PROCEDURE — 85014 HEMATOCRIT: CPT

## 2025-04-17 PROCEDURE — 72170 X-RAY EXAM OF PELVIS: CPT

## 2025-04-17 PROCEDURE — 94640 AIRWAY INHALATION TREATMENT: CPT

## 2025-04-17 PROCEDURE — 36430 TRANSFUSION BLD/BLD COMPNT: CPT

## 2025-04-17 PROCEDURE — NC001 PR NO CHARGE: Performed by: INTERNAL MEDICINE

## 2025-04-17 PROCEDURE — 5A1935Z RESPIRATORY VENTILATION, LESS THAN 24 CONSECUTIVE HOURS: ICD-10-PCS | Performed by: EMERGENCY MEDICINE

## 2025-04-17 PROCEDURE — 87040 BLOOD CULTURE FOR BACTERIA: CPT

## 2025-04-17 PROCEDURE — 99291 CRITICAL CARE FIRST HOUR: CPT | Performed by: SURGERY

## 2025-04-17 RX ORDER — LEVALBUTEROL INHALATION SOLUTION 0.63 MG/3ML
0.63 SOLUTION RESPIRATORY (INHALATION) EVERY 6 HOURS PRN
Status: DISCONTINUED | OUTPATIENT
Start: 2025-04-17 | End: 2025-04-23

## 2025-04-17 RX ORDER — FENTANYL CITRATE 50 UG/ML
25 INJECTION, SOLUTION INTRAMUSCULAR; INTRAVENOUS ONCE
Refills: 0 | Status: COMPLETED | OUTPATIENT
Start: 2025-04-17 | End: 2025-04-17

## 2025-04-17 RX ORDER — CHLORHEXIDINE GLUCONATE ORAL RINSE 1.2 MG/ML
15 SOLUTION DENTAL EVERY 12 HOURS SCHEDULED
Status: DISCONTINUED | OUTPATIENT
Start: 2025-04-17 | End: 2025-04-24 | Stop reason: HOSPADM

## 2025-04-17 RX ORDER — FENTANYL CITRATE 50 UG/ML
75 INJECTION, SOLUTION INTRAMUSCULAR; INTRAVENOUS ONCE
Refills: 0 | Status: COMPLETED | OUTPATIENT
Start: 2025-04-17 | End: 2025-04-17

## 2025-04-17 RX ORDER — ETOMIDATE 2 MG/ML
30 INJECTION INTRAVENOUS ONCE
Status: COMPLETED | OUTPATIENT
Start: 2025-04-17 | End: 2025-04-17

## 2025-04-17 RX ORDER — METHYLPREDNISOLONE SODIUM SUCCINATE 40 MG/ML
40 INJECTION, POWDER, LYOPHILIZED, FOR SOLUTION INTRAMUSCULAR; INTRAVENOUS ONCE
Status: COMPLETED | OUTPATIENT
Start: 2025-04-17 | End: 2025-04-17

## 2025-04-17 RX ORDER — FENTANYL CITRATE-0.9 % NACL/PF 10 MCG/ML
25 PLASTIC BAG, INJECTION (ML) INTRAVENOUS CONTINUOUS
Status: DISCONTINUED | OUTPATIENT
Start: 2025-04-17 | End: 2025-04-17

## 2025-04-17 RX ORDER — CINACALCET 30 MG/1
30 TABLET, FILM COATED ORAL
Status: DISCONTINUED | OUTPATIENT
Start: 2025-04-18 | End: 2025-04-21

## 2025-04-17 RX ORDER — VECURONIUM BROMIDE 1 MG/ML
10 INJECTION, POWDER, LYOPHILIZED, FOR SOLUTION INTRAVENOUS ONCE
Status: COMPLETED | OUTPATIENT
Start: 2025-04-17 | End: 2025-04-17

## 2025-04-17 RX ORDER — VECURONIUM BROMIDE 1 MG/ML
5 INJECTION, POWDER, LYOPHILIZED, FOR SOLUTION INTRAVENOUS ONCE
Status: COMPLETED | OUTPATIENT
Start: 2025-04-17 | End: 2025-04-17

## 2025-04-17 RX ADMIN — SODIUM CHLORIDE 1000 ML: 0.9 INJECTION, SOLUTION INTRAVENOUS at 13:45

## 2025-04-17 RX ADMIN — FENTANYL CITRATE 75 MCG: 50 INJECTION INTRAMUSCULAR; INTRAVENOUS at 14:32

## 2025-04-17 RX ADMIN — ETOMIDATE INJECTION 30 MG: 2 SOLUTION INTRAVENOUS at 14:25

## 2025-04-17 RX ADMIN — LEVALBUTEROL HYDROCHLORIDE 0.63 MG: 0.63 SOLUTION RESPIRATORY (INHALATION) at 17:58

## 2025-04-17 RX ADMIN — VECURONIUM BROMIDE 10 MG: 10 INJECTION, POWDER, LYOPHILIZED, FOR SOLUTION INTRAVENOUS at 14:25

## 2025-04-17 RX ADMIN — IOHEXOL 85 ML: 350 INJECTION, SOLUTION INTRAVENOUS at 15:07

## 2025-04-17 RX ADMIN — METHYLPREDNISOLONE SODIUM SUCCINATE 40 MG: 40 INJECTION, POWDER, FOR SOLUTION INTRAMUSCULAR; INTRAVENOUS at 18:16

## 2025-04-17 RX ADMIN — Medication 25 MCG/HR: at 14:33

## 2025-04-17 RX ADMIN — IOHEXOL 100 ML: 350 INJECTION, SOLUTION INTRAVENOUS at 13:52

## 2025-04-17 RX ADMIN — PERFLUTREN 0.4 ML/MIN: 6.52 INJECTION, SUSPENSION INTRAVENOUS at 16:30

## 2025-04-17 RX ADMIN — FENTANYL CITRATE 25 MCG: 50 INJECTION INTRAMUSCULAR; INTRAVENOUS at 14:22

## 2025-04-17 RX ADMIN — VECURONIUM BROMIDE 5 MG: 1 INJECTION, POWDER, LYOPHILIZED, FOR SOLUTION INTRAVENOUS at 14:26

## 2025-04-17 RX ADMIN — Medication 10 MCG/MIN: at 14:23

## 2025-04-17 RX ADMIN — CHLORHEXIDINE GLUCONATE 15 ML: 1.2 SOLUTION ORAL at 23:10

## 2025-04-17 NOTE — ASSESSMENT & PLAN NOTE
Hx of chronic hypotension on midodrine.  Presents following fall, no evidence of overt bleeding . Hb 9  Presents with syncope, profound hypotension   Hx of ischemic cardiomyopathy with EF35%, Cirrhosis  No evidence of sepsis  CTA chest - no evidence of PE  CT abdomen-Small subcutaneous hematoma in the right hip without evidence of active bleeding   Patient altered ,hypervolemia and well perfused, CXR pulmonary vascular congestion and pleural effusion   Etiology unclear  Lactic acid normal       Plan   Continue pressors with Levo goal MAP>65, wean as able  Start abx with cefepime and vanco  Follow up BC and other infectious work up  Follow up repeat Echo

## 2025-04-17 NOTE — ASSESSMENT & PLAN NOTE
Patient received 2 units of whole blood and was placed on Levophed for BP assistance  Bedside ultrasound completed that showed reduced EF  Admit to medical critical care for continued monitoring and management  Echocardiogram ordered

## 2025-04-17 NOTE — ASSESSMENT & PLAN NOTE
Patient suffered fall in a parking lot.  Story is unclear though it is believed to be associated with hypotension due to patient being found with a blood pressure in the 60s by EMS.  Primary and secondary evaluation were negative for acute traumatic injury  Trauma will continue to follow for tertiary evaluation on 4/18  See below

## 2025-04-17 NOTE — ASSESSMENT & PLAN NOTE
Multifactorial etiology-ESRD, chronic disease  On admission Hb 9.5, s/p 2 unit whole blood for suspected hemorrhagic shock  Check Folate, B2 given macrocytosis

## 2025-04-17 NOTE — PLAN OF CARE
Problem: SAFETY,RESTRAINT: NV/NON-SELF DESTRUCTIVE BEHAVIOR  Goal: Remains free of harm/injury (restraint for non violent/non self-detsructive behavior)  Description: INTERVENTIONS:- Instruct patient/family regarding restraint use - Assess and monitor physiologic and psychological status - Provide interventions and comfort measures to meet assessed patient needs - Identify and implement measures to help patient regain control- Assess readiness for release of restraint   Outcome: Progressing  Goal: Returns to optimal restraint-free functioning  Description: INTERVENTIONS:- Assess the patient's behavior and symptoms that indicate continued need for restraint- Identify and implement measures to help patient regain control- Assess readiness for release of restraint   Outcome: Progressing     Problem: Prexisting or High Potential for Compromised Skin Integrity  Goal: Skin integrity is maintained or improved  Description: INTERVENTIONS:- Identify patients at risk for skin breakdown- Assess and monitor skin integrity- Assess and monitor nutrition and hydration status- Monitor labs - Assess for incontinence - Turn and reposition patient- Assist with mobility/ambulation- Relieve pressure over bony prominences- Avoid friction and shearing- Provide appropriate hygiene as needed including keeping skin clean and dry- Evaluate need for skin moisturizer/barrier cream- Collaborate with interdisciplinary team - Patient/family teaching- Consider wound care consult   Outcome: Progressing

## 2025-04-17 NOTE — ASSESSMENT & PLAN NOTE
Hx of upper extremity DVT in right upper extremity of eliquis 2.5 bid  Can switch to heparin gtte if anticipated procedures

## 2025-04-17 NOTE — ASSESSMENT & PLAN NOTE
Wt Readings from Last 3 Encounters:   04/17/25 103 kg (227 lb)   04/09/25 100 kg (221 lb)   04/01/25 97.1 kg (214 lb)        Combined systolic and diastolic HF, [facility]  Last echo March 2025- Ef 30-35% with grade III DD  Medications;Toprol-XL 25 twice daily, Entresto 24-26 twice daily with Volume management: Dialysis  For this admission volume up with concern for cardiogenic shock     BNP >4700  Trop Ho 130   CXR Pulmonary vascular congestion and      Plan   Volume per Dialysis  Continue pressor support, wean able  Hold home GDMT in the setting of hypotension

## 2025-04-17 NOTE — ASSESSMENT & PLAN NOTE
Wt Readings from Last 3 Encounters:   04/17/25 103 kg (227 lb 1.2 oz)   04/09/25 100 kg (221 lb)   04/01/25 97.1 kg (214 lb)

## 2025-04-17 NOTE — ASSESSMENT & PLAN NOTE
Patient became acutely hypoxic during initial evaluation requiring intubation  CXR concerning for acute CHF exacerbation  Admitted to medical critical care for continued monitoring and workup

## 2025-04-17 NOTE — PROCEDURES
Arterial Line Insertion    Date/Time: 4/17/2025 3:01 PM    Performed by: Jose Metcalf MD  Authorized by: Jose Metcalf MD    Patient location:  ED  Other Assisting Provider: No    Consent:     Consent obtained:  Emergent situation  Universal protocol:     Patient identity confirmed:  Arm band  Indications:     Indications: hemodynamic monitoring and continuous blood pressure monitoring    Pre-procedure details:     Skin preparation:  Chlorhexidine  Sedation:     Sedation type: Patient was intubated and sedated at the time.  Anesthesia (see MAR for exact dosages):     Anesthesia method:  None  Procedure details:     Location / Tip of Catheter:  Radial    Laterality:  Right    Chris's test performed: no      Needle gauge:  20 G    Placement technique:  Ultrasound guided    Ultrasound image availability:  Not obtained due to urgency    Number of attempts:  1    Successful placement: yes      Transducer: waveform confirmed and dampened amplitude    Post-procedure details:     Post-procedure:  Secured with tape, sterile dressing applied and sutured    CMS:  Normal    Patient tolerance of procedure:  Tolerated well, no immediate complications

## 2025-04-17 NOTE — ASSESSMENT & PLAN NOTE
CAD S/P multiple stents with history over the past years, most recently in 2025 2/27/2025 [facility]: Mid circumflex 10%, first obtuse marginal 100%, first %, severe in-stent restenosis of LAD and  of OM1 ([facility]   3/17/2025 [facility] LM normal, proximal LAD 90%, circumflex luminal irregularities, first obtuse marginal 100%, right system not described, successful balloon angioplasty to proximal LAD with 0% residual stenosis ([facility])  He is on DAPT, statin    Hold medications while Intubated and NPO

## 2025-04-17 NOTE — ASSESSMENT & PLAN NOTE
-Initially intubated.  With plans for extubation tonight per ICU team  - CT scan of the chest-no PE.  Cardiomegaly with small right effusion and interstitial edema.

## 2025-04-17 NOTE — ASSESSMENT & PLAN NOTE
Presented following fall/mechanical  Found hypotensive  No syncope  Hypotension  Trauma scan without fracture  Fall precautions  PT/OT

## 2025-04-17 NOTE — RESPIRATORY THERAPY NOTE
RT Ventilator Management Note  Asad Galloway 65 y.o. male MRN: [id number]  Unit/Bed#: ICU 11 Encounter: [id number]      Daily Screen    No data found in the last 10 encounters.           Physical Exam:   Assessment Type: Assess only  General Appearance: Eyes do not open to any stimulus, Sedated  Respiratory Pattern: Normal  Chest Assessment: Chest expansion symmetrical  Bilateral Breath Sounds: Clear, Diminished  O2 Device: vent      Resp Comments: Pt received from ED therapist, placed on bedside ventilator without incident. Pt synchronous with vent.   04/17/25 1534   Respiratory Assessment   Assessment Type Assess only   General Appearance Eyes do not open to any stimulus;Sedated   Respiratory Pattern Normal   Chest Assessment Chest expansion symmetrical   Bilateral Breath Sounds Clear;Diminished   Resp Comments Pt received from ED therapist, placed on bedside ventilator without incident. Pt synchronous with vent.   O2 Device vent   Vent Information   Vent    Vent type Arnold G5   Arnold Vent Mode (S)CMV   $ Vital Capacity Mech/Peak Flow Yes   $ Pulse Oximetry Spot Check Charge Completed   (S)CMV Settings   Resp Rate (BPM) 14 BPM   VT (mL) 500 mL   FIO2 (%) 50 %   PEEP (cmH2O) 6 cmH2O   I:E Ratio 1:3.3   Flow Trigger (LPM) 3   Humidification Heater   Heater Temperature (Set) 98.6 °F (37 °C)   (S)CMV Actuals   Resp Rate (BPM) 15 BPM   VT (mL) 547   MV 8.1   MAP (cmH2O) 10 cmH2O   Peak Pressure (cmH2O) 25 cmH2O   I:E Ratio (Obs) 1:3   Insp Resistance 16   Heater Temperature (Obs) 98.6 °F (37 °C)   Static Compliance (mL/cmH20) 46.8 mL/cmH2O   Plateau Pressure (cm H2O) 24 cm H2O   (S)CMV Alarms   High Peak Pressure (cmH2O) 42   Low Pressure (cmH2O) 5 cm H2O   High Resp Rate (BPM) 28 BPM   Low Resp Rate (BPM) 8 BPM   High MV (L/min) 14 L/min   Low MV (L/min) 4 L/min   High VT (mL) 800 mL   Low VT (mL) 250 mL   Apnea Time (s) 20 S   Maintenance   Alarm (pink) cable attached Yes   Resuscitation bag with peep  valve at bedside Yes   Water bag changed Yes   Circuit changed Yes   ETT  Cuffed 7.5 mm   Placement Date/Time: 04/17/25 (c) 3838   Type: Cuffed  Tube Size: 7.5 mm  Location: Oral  Insertion attempts: 1  Placement Verification: Chest x-ray;End tidal CO2;Fiberoptic visualization;Symmetrical chest wall movement  Secured at (cm): 23   Secured at (cm) 26   Measured from Lips   Secured Location Center   Secured by Commercial tube ahumada   Site Condition Cool;Dry   Cuff Pressure (cm H2O) 26 cm H2O   HI-LO Suction  Continuous low suction   HI-LO Secretions Scant   HI-LO Intervention Patent     1720 Pt on SBT 8/+6/40% per  for 30 mins, pt extubated to HFNC 55L/45% without incident. SpO2 98%.

## 2025-04-17 NOTE — ASSESSMENT & PLAN NOTE
Initially there was concern for shock and started on vasopressors.  When I saw the patient earlier this evening, his blood pressures were 120s to 140s systolic    COVID, flu, RSV negative  Blood cultures pending

## 2025-04-17 NOTE — PROCEDURES
POC FAST US    Date/Time: 4/17/2025 2:43 PM    Performed by: BRITTANY Womack  Authorized by: BRITTANY Womack    Patient location:  Trauma  Procedure details:     Exam Type:  Diagnostic    Indications: blunt abdominal trauma and blunt chest trauma      Assess for:  Intra-abdominal fluid and pericardial effusion    Technique: FAST      Views obtained:  Heart - Pericardial sac, LUQ - Splenorenal space, Suprapubic - Pouch of Jacinto and RUQ - Hodge's Pouch    Image quality: diagnostic      Image availability:  Images available in PACS and video obtained  FAST Findings:     RUQ (Hepatorenal) free fluid: absent      LUQ (Splenorenal) free fluid: absent      Suprapubic free fluid: absent      Cardiac wall motion: identified      Pericardial effusion: absent    Interpretation:     Impressions: negative

## 2025-04-17 NOTE — ASSESSMENT & PLAN NOTE
Lab Results   Component Value Date    HGBA1C 5.4 04/10/2025     Controlled  Will continue SSI q 6 while intubated  Hypoglycemia protocol

## 2025-04-17 NOTE — PROCEDURES
Central Line Insertion    Date/Time: 4/17/2025 2:49 PM    Performed by: BRITTANY Womack  Authorized by: BRITTANY Womack    Patient location:  ED and other (comment) ([facility])  Consent:     Consent obtained:  Emergent situation  Pre-procedure details:     Hand hygiene: Hand hygiene performed prior to insertion      Skin preparation:  2% chlorhexidine    Skin preparation agent: Skin preparation agent completely dried prior to procedure    Indications:     Central line indications: medications requiring central line and other (comment)      Central line indications comment:  Emergent blood administration  Procedure details:     Location:  Right femoral    Vessel type: vein      Laterality:  Right    Approach: percutaneous technique used      Patient position:  Flat    Catheter type:  Cordis    Landmarks identified: yes      Ultrasound guidance: yes      Ultrasound image availability:  Not saved    Number of attempts:  1    Successful placement: yes    Post-procedure details:     Post-procedure:  Line sutured and dressing applied    Assessment:  Blood return through all ports    Post-procedure complications: none      Patient tolerance of procedure:  Tolerated well, no immediate complications

## 2025-04-17 NOTE — ASSESSMENT & PLAN NOTE
Patient was noted to be hypoxic at the CT when laid flat  Reportedly has been coughing for 5 days now  Patient intubated at the ED  COVID flu RSV pending  WBC normal, procalcitonin pending  CT chest no large PE, groundglass opacity subtle bibasilar infiltrate suspect pulmonary edema-       Plan  As medical history  Vendt- AC 16/450/50/40      - sedation Fentanyl       - Daily SAT, SBT            Broad spectrum abx  Follow u covid/flu/rsv, urine antigens and MRSA  Volume management with dialysis

## 2025-04-17 NOTE — ASSESSMENT & PLAN NOTE
Echo March 2025 LVEF 35-40%, global hypokinesis with regional variations, moderately reduced RV systolic function, severe left atrial dilation moderate to severely calcified aortic valve with moderate to severe aortic stenosis   Hold home GDMT in the setting of shock

## 2025-04-17 NOTE — PATIENT INSTRUCTIONS
"Flonase 1 spray each nostril daily.  Claritin daily for allergy symptoms  Saline nasal spray as directed to rinse sinus passages.  Increase your fluid intake.  Tylenol as needed for pain or fever.  Follow up with your PCP for worsening or concerning symptoms  If symptoms worsen, proceed to the ER    Patient Education     Upper respiratory infection in adults - Discharge instructions   The Basics   Written by the doctors and editors at [facility]   What are discharge instructions? -- Discharge instructions are information about how to take care of yourself after getting medical care for a health problem.  What is an upper respiratory infection? -- An upper respiratory infection (\"URI\") is an illness that can affect your nose, throat, ears, and sinuses. Almost all URIs are caused by a virus. The common cold is an example of a viral URI. Some URIs are caused by bacteria, but this is much less common.  URIs spread easily from person to person, most often through coughing or sneezing. A URI will almost always get better in a week or 2 without any treatment. Because most URIs are caused by viruses, antibiotics do not usually help.  If you do have a bacterial infection, your doctor might prescribe antibiotics.  How do I care for myself at home? -- Ask the doctor or nurse what you should do when you go home. Make sure that you understand exactly what you need to do to care for yourself. Ask questions if there is anything you do not understand.  You should also:   Wash your hands often (figure 1), and cough or sneeze into a tissue. If you do not have a tissue, cough or sneeze into your elbow instead of your hands.   Drink lots of fluids (water, juice, or broth) to stay hydrated, unless your doctor told you otherwise. This will help replace any fluids lost through runny nose or fever. Warm tea or soup can also help soothe a sore throat.   To help a stuffy nose and make it easier to breathe:   Use saline nose drops or spray.   " Use a humidifier if the air in your home feels dry.   Follow the directions on the label carefully if you take over-the-counter cough or cold medicines. Do not take more than 1 medicine that contains acetaminophen. Also, if you have a heart problem or high blood pressure, check with your doctor before you take any of these medicines.   Try to quit smoking if you smoke. Your doctor or nurse can help.  How can I prevent getting another URI? -- The best way to prevent a URI, or keep it from spreading to others, is to keep your hands clean. Wash your hands often with soap and water or alcohol gel rubs.  Some other ways to prevent the spread of infection include:   Always wash your hands with soap and water after you cough, sneeze, or blow your nose.   Clean surfaces and objects that you touch a lot. These include sinks, counters, tables, door handles, remotes, and phones. Use a bleach and water mixture. The germs that cause a URI can live on surfaces for at least 2 hours.   Do not share cups, food, towels, bed linens, or other personal items.   Stay away from other people when you are sick. When you do need to be around other people, consider wearing a face mask.  When should I call the doctor? -- Call for advice if:   You have a persistent fever of 100.4°F (38°C) or higher, chills, a very bad sore throat, or ear or sinus pain.   You get a new fever after several days of feeling the same or getting better.   You start having chest pain when you cough.   You have a cough that lasts more than 10 days.   You cough up blood.   You have any new or worsening symptoms, such as worsening cough or trouble breathing.  All topics are updated as new evidence becomes available and our peer review process is complete.  This topic retrieved from [facility] on: Mar 13, 2024.  Topic 274099 Version 1.0  Release: 32.2.4 - C32.71  © 2024 UpToDate, Inc. and/or its affiliates. All rights reserved.  figure 1: How to wash your hands     Wet your  hands with clean water, and apply a small amount of soap. Lather and rub hands together for at least 20 seconds. Clean your wrists, palms, backs of your hands, between your fingers, tips of your fingers, thumbs, and under and around your nails. Rinse well, and dry your hands using a clean towel.  Graphic 660796 Version 7.0  Consumer Information Use and Disclaimer   Disclaimer: This generalized information is a limited summary of diagnosis, treatment, and/or medication information. It is not meant to be comprehensive and should be used as a tool to help the user understand and/or assess potential diagnostic and treatment options. It does NOT include all information about conditions, treatments, medications, side effects, or risks that may apply to a specific patient. It is not intended to be medical advice or a substitute for the medical advice, diagnosis, or treatment of a health care provider based on the health care provider's examination and assessment of a patient's specific and unique circumstances. Patients must speak with a health care provider for complete information about their health, medical questions, and treatment options, including any risks or benefits regarding use of medications. This information does not endorse any treatments or medications as safe, effective, or approved for treating a specific patient. UpToDate, Inc. and its affiliates disclaim any warranty or liability relating to this information or the use thereof.The use of this information is governed by the Terms of Use, available at https://www.[employer].com/en/know/clinical-effectiveness-terms. 2024© UpToDate, Inc. and its affiliates and/or licensors. All rights reserved.  Copyright   © 2024 UpToDate, Inc. and/or its affiliates. All rights reserved.

## 2025-04-17 NOTE — PROGRESS NOTES
[facility]        NAME: Asad Galloway is a 65 y.o. male  : 1960    MRN: [id number]  DATE: 2025  TIME: 9:31 AM    Assessment and Plan   Acute cough [R05.1]  1. Acute cough  XR chest pa and lateral    XR chest pa and lateral        Chest x-ray completed in office.  Pulmonary read by myself is negative for acute illness.  Pending official radiology read.    Patient Instructions   Flonase 1 spray each nostril daily.  Claritin daily for allergy symptoms  Saline nasal spray as directed to rinse sinus passages.  Increase your fluid intake.  Tylenol as needed for pain or fever.  Follow up with your PCP for worsening or concerning symptoms  If symptoms worsen, proceed to the ER      Follow up with PCP in 3-5 days.  Proceed to  ER if symptoms worsen.    Chief Complaint     Chief Complaint   Patient presents with    Vomiting     Had one episode of vomiting since last week and has been having a cough since then.     Eye Swelling     Bilateral eye swelling for a couple of days that is causing irritation and pain.         History of Present Illness       Patient is a 65-year-old male presenting with 5 days of cough.  His wife states that he vomited 1 time and since then he has been coughing.  The cough is productive. He does go to dialysis 3 times per week.  She states that they removed a lot of fluid yesterday.  He also reports some swelling around his eyes.  Denies fevers or chills.  Denies shortness of breath or chest pain.  No over-the-counter medications attempted.    Vomiting   Associated symptoms include coughing. Pertinent negatives include no chills or fever.       Review of Systems   Review of Systems   Constitutional:  Negative for activity change, chills and fever.   HENT:  Positive for facial swelling. Negative for congestion, rhinorrhea and sore throat.    Respiratory:  Positive for cough. Negative for chest tightness and shortness of breath.    Gastrointestinal:  Positive for vomiting.          Current Medications       Current Outpatient Medications:     apixaban (Eliquis) 2.5 mg, Take 1 tablet (2.5 mg total) by mouth 2 (two) times a day, Disp: 60 tablet, Rfl: 3    aspirin 81 mg chewable tablet, Chew 81 mg daily, Disp: , Rfl:     atorvastatin (LIPITOR) 40 mg tablet, Take 1 tablet (40 mg total) by mouth daily with dinner, Disp: 90 tablet, Rfl: 3    cinacalcet (SENSIPAR) 30 mg tablet, Take 30 mg by mouth, Disp: , Rfl:     melatonin 3 mg, Take 3 mg by mouth, Disp: , Rfl:     metoprolol succinate (TOPROL-XL) 25 mg 24 hr tablet, TAKE 1 TABLET BY MOUTH TWICE A DAY, Disp: 180 tablet, Rfl: 1    midodrine (PROAMATINE) 10 MG tablet, Take 10 mg by mouth, Disp: , Rfl:     prasugrel (EFFIENT) tablet, Take 1 tablet (10 mg total) by mouth daily, Disp: 90 tablet, Rfl: 3    sacubitril-valsartan (Entresto) 24-26 MG TABS, Take 1 tablet by mouth 2 (two) times a day, Disp: 180 tablet, Rfl: 3    Sevelamer Carbonate 2.4 g, 1 packet Three times a day, Disp: , Rfl:     Vitamin D3 1.25 MG (98321 UT) capsule, TAKE 1 CAPSULE BY MOUTH ONE TIME PER WEEK, Disp: 12 capsule, Rfl: 4    doxycycline hyclate (VIBRAMYCIN) 100 mg capsule, Take 1 capsule (100 mg total) by mouth every 12 (twelve) hours for 10 days, Disp: 20 capsule, Rfl: 0    midodrine (PROAMATINE) 2.5 mg tablet, Take 2.5 mg by mouth, Disp: , Rfl:     MIDODRINE HCL PO, Take 5 mg by mouth PRN w/ dialysis, Disp: , Rfl:     mupirocin (BACTROBAN) 2 % ointment, Apply topically 2 (two) times a day (Patient not taking: Reported on 4/17/2025), Disp: 30 g, Rfl: 1    Current Allergies     Allergies as of 04/17/2025 - Reviewed 04/17/2025   Allergen Reaction Noted    Penicillin g Hives 03/25/2025    Shellfish-derived products - food allergy Rash 03/03/2022            The following portions of the patient's history were reviewed and updated as appropriate: allergies, current medications, past family history, past medical history, past social history, past surgical history and problem  list.     Past Medical History:   Diagnosis Date    Cerebrovascular accident (CVA) due to thrombosis of left middle cerebral artery (HCC) 07/29/2018    Chronic kidney disease     Coronary artery disease     Diabetes mellitus (HCC)     not on meds    Dialysis patient ([facility])     M-W-F    Fistula     left upper arm for hemodialyis    GERD (gastroesophageal reflux disease)     History of coronary artery stent placement     x3    Hypercholesteremia     Hyperlipidemia     Hypertension     Infectious viral hepatitis     B as child    Limb alert care status     no BP/IV left arm    Neuropathy     Nonhealing ulcer of heel (HCC) 04/25/2023    Obesity     Osteomyelitis ([facility])     last assessed 11/4/16-per son not currently    Penetrating foot wound, left, initial encounter 01/19/2022    PVC's (premature ventricular contractions)     sees  Cardio    Stroke ([facility])     last weeof July 2018 [facility]    TIA (transient ischemic attack) 10/28/2018    Ulcer of left heel, limited to breakdown of skin (HCC) 06/13/2024    Wears dentures     Wears glasses        Past Surgical History:   Procedure Laterality Date    ABDOMINAL SURGERY      CARDIAC CATHETERIZATION N/A 05/02/2022    Procedure: Cardiac Coronary Angiogram;  Surgeon: Sam Davis MD;  [facility];  Service: Cardiology    CARDIAC CATHETERIZATION N/A 05/02/2022    Procedure: Cardiac pci;  Surgeon: Sam Davis MD;  [facility]: [facility];  Service: Cardiology    CARDIAC CATHETERIZATION  02/01/2023    Procedure: Cardiac catheterization;  Surgeon: Sam Davis MD;  [facility]: [facility];  Service: Cardiology    CARDIAC CATHETERIZATION N/A 02/01/2023    Procedure: Cardiac pci;  Surgeon: Sam Davis MD;  [facility]: [facility];  Service: Cardiology    CARDIAC CATHETERIZATION N/A 02/01/2023    Procedure: Cardiac Coronary Angiogram;  Surgeon: Sam Davis MD;  [facility]: [facility];  Service: Cardiology    CARDIAC  "CATHETERIZATION N/A 02/01/2023    Procedure: Cardiac other-IVUS;  Surgeon: Sam Davis MD;  Location: [facility]ARD[facility];  Service: Cardiology    CHOLECYSTECTOMY      Percutaneous    COLONOSCOPY      CORONARY ANGIOPLASTY WITH STENT PLACEMENT      CYSTOSCOPY      HEMODIALYSIS ADULT  1/22/2024    HEMODIALYSIS ADULT  01/24/2024    IR LOWER EXTREMITY ANGIOGRAM  9/29/2023    IR LOWER EXTREMITY ANGIOGRAM  02/26/2024    OTHER SURGICAL HISTORY      \"stimulator to control bowel movements\"    WA ESOPHAGOGASTRODUODENOSCOPY TRANSORAL DIAGNOSTIC N/A 09/27/2016    Procedure: ESOPHAGOGASTRODUODENOSCOPY (EGD);  Surgeon: Adele Rowe MD;  Location: [facility] GI LAB;  Service: Gastroenterology    WA LAPAROSCOPY SURG CHOLECYSTECTOMY N/A 02/29/2016    Procedure: LAPAROSCOPIC CHOLECYSTECTOMY ;  Surgeon: Cliff Roman DO;  Location: [facility] Main [facility];  Service: General    WA SLCTV CATHJ 3RD+ ORD SLCTV ABDL PEL/LXTR BRNCH Left 9/29/2023    Procedure: Left leg angiogram with intervention;  Surgeon: Michelle Galvan MD;  Location: [facility];  Service: Vascular    WA SLCTV CATHJ 3RD+ ORD SLCTV ABDL PEL/LXTR BRNCH Left 02/26/2024    Procedure: Left leg angiogram antegrade access, left popliteal angioplasty;  Surgeon: Michelle Galvan MD;  Location: [facility];  Service: Vascular    ROTATOR CUFF REPAIR Right     SPINAL CORD STIMULATOR IMPLANT      \"Medtronic interstim model # 3058- in lower back to control bowel movements-currently turned off-battery is dead\"    TOE AMPUTATION Right 10/28/2016    Procedure: 3RD TOE AMPUTATION ;  Surgeon: Anjel Salas DPM;  Location: [facility];  Service:        Family History   Problem Relation Age of Onset    Leukemia Mother     Liver disease Mother     Lung cancer Mother         heavy smoker - 3 ppd    Heart disease Father     Liver disease Father     Diabetes type I Father     Multiple myeloma Sister     Breast cancer Sister     Urolithiasis Family     Alcohol abuse Neg Hx     Depression Neg Hx     " Drug abuse Neg Hx     Substance Abuse Neg Hx     Mental illness Neg Hx          Medications have been verified.        Objective   /70 (BP Location: Right arm, Patient Position: Sitting, Cuff Size: Standard)   Pulse 83   Temp (!) 97 °F (36.1 °C) (Temporal)   Resp 18   SpO2 94%        Physical Exam     Physical Exam  Vitals reviewed.   Constitutional:       General: He is awake. He is not in acute distress.  HENT:      Head: Normocephalic.   Cardiovascular:      Rate and Rhythm: Normal rate and regular rhythm.      Heart sounds: Normal heart sounds, S1 normal and S2 normal.   Pulmonary:      Effort: Pulmonary effort is normal.      Breath sounds: Normal breath sounds. No decreased breath sounds, wheezing or rhonchi.   Skin:     General: Skin is warm and moist.   Neurological:      General: No focal deficit present.      Mental Status: He is alert and oriented to person, place, and time.   Psychiatric:         Behavior: Behavior is cooperative.

## 2025-04-17 NOTE — ED PROVIDER NOTES
Emergency Department Airway Evaluation and Management Form    History  Obtained from: EMS  Penicillin g and Shellfish-derived products - food allergy  Chief Complaint   Patient presents with    Trauma     Pt got out of truck to go into dialysis when he syncopized +LOC +Head Strike +Nose Lac +Thinner; Hypotensive in the 80's for EMS     HPI    65-year-old male, fall, head strike initially hypotensive below 90 systolic, on anticoagulation    Airway intact.  Rest of eval and treatment by trauma team    Past Medical History:   Diagnosis Date    Cerebrovascular accident (CVA) due to thrombosis of left middle cerebral artery ([facility]) 07/29/2018    Chronic kidney disease     Coronary artery disease     Diabetes mellitus (HCC)     not on meds    Dialysis patient ([facility])     M-W-F    Fistula     left upper arm for hemodialyis    GERD (gastroesophageal reflux disease)     History of coronary artery stent placement     x3    Hypercholesteremia     Hyperlipidemia     Hypertension     Infectious viral hepatitis     B as child    Limb alert care status     no BP/IV left arm    Neuropathy     Nonhealing ulcer of heel ([facility]) 04/25/2023    Obesity     Osteomyelitis ([facility])     last assessed 11/4/16-per son not currently    Penetrating foot wound, left, initial encounter 01/19/2022    PVC's (premature ventricular contractions)     sees  Cardio    Stroke ([facility])     last weeof July 2018 [facility]    TIA (transient ischemic attack) 10/28/2018    Ulcer of left heel, limited to breakdown of skin ([facility]) 06/13/2024    Wears dentures     Wears glasses      Past Surgical History:   Procedure Laterality Date    ABDOMINAL SURGERY      CARDIAC CATHETERIZATION N/A 05/02/2022    Procedure: Cardiac Coronary Angiogram;  Surgeon: Sam Davis MD;  Location: [facility] CARD[facility];  Service: Cardiology    CARDIAC CATHETERIZATION N/A 05/02/2022    Procedure: Cardiac pci;  Surgeon: Sam Davis MD;  [facility]: [facility]ARD[facility];  Service:  "Cardiology    CARDIAC CATHETERIZATION  02/01/2023    Procedure: Cardiac catheterization;  Surgeon: Sam Davis MD;  [facility]: [facility] CARDIAC [facility];  Service: Cardiology    CARDIAC CATHETERIZATION N/A 02/01/2023    Procedure: Cardiac pci;  Surgeon: Sam Davis MD;  Location: [facility] CARD[facility];  Service: Cardiology    CARDIAC CATHETERIZATION N/A 02/01/2023    Procedure: Cardiac Coronary Angiogram;  Surgeon: Sam Davis MD;  Location: [facility] CARD[facility];  Service: Cardiology    CARDIAC CATHETERIZATION N/A 02/01/2023    Procedure: Cardiac other-IVUS;  Surgeon: Sam Davis MD;  Location: [facility] CARDIAC CATH [facility];  Service: Cardiology    CHOLECYSTECTOMY      Percutaneous    COLONOSCOPY      CORONARY ANGIOPLASTY WITH STENT PLACEMENT      CYSTOSCOPY      HEMODIALYSIS ADULT  1/22/2024    HEMODIALYSIS ADULT  01/24/2024    IR LOWER EXTREMITY ANGIOGRAM  9/29/2023    IR LOWER EXTREMITY ANGIOGRAM  02/26/2024    OTHER SURGICAL HISTORY      \"stimulator to control bowel movements\"    IA ESOPHAGOGASTRODUODENOSCOPY TRANSORAL DIAGNOSTIC N/A 09/27/2016    Procedure: ESOPHAGOGASTRODUODENOSCOPY (EGD);  Surgeon: Adele Rowe MD;  Location: [facility] GI LAB;  Service: Gastroenterology    IA LAPAROSCOPY SURG CHOLECYSTECTOMY N/A 02/29/2016    Procedure: LAPAROSCOPIC CHOLECYSTECTOMY ;  Surgeon: Cliff Roman DO;  Location: [facility] Main OR;  Service: General    IA SLCTV CATHJ 3RD+ ORD SLCTV ABDL PEL/LXTR BRNCH Left 9/29/2023    Procedure: Left leg angiogram with intervention;  Surgeon: Michelle Galvan MD;  Location: [facility] MAIN OR;  Service: Vascular    IA SLCTV CATHJ 3RD+ ORD SLCTV ABDL PEL/LXTR BRNCH Left 02/26/2024    Procedure: Left leg angiogram antegrade access, left popliteal angioplasty;  Surgeon: Michelle Galvan MD;  Location: [facility] MA[facility] OR;  Service: Vascular    ROTATOR CUFF REPAIR Right     SPINAL CORD STIMULATOR IMPLANT      \"Medtronic interstim model # 3058- in lower back to control bowel movements-currently turned off-battery is dead\" " "   TOE AMPUTATION Right 10/28/2016    Procedure: 3RD TOE AMPUTATION ;  Surgeon: Anjel Salas DPM;  Location: [facility] Main OR;  Service:      Family History   Problem Relation Age of Onset    Leukemia Mother     Liver disease Mother     Lung cancer Mother         heavy smoker - 3 ppd    Heart disease Father     Liver disease Father     Diabetes type I Father     Multiple myeloma Sister     Breast cancer Sister     Urolithiasis Family     Alcohol abuse Neg Hx     Depression Neg Hx     Drug abuse Neg Hx     Substance Abuse Neg Hx     Mental illness Neg Hx      Social History     Tobacco Use    Smoking status: Never    Smokeless tobacco: Never   Vaping Use    Vaping status: Never Used   Substance Use Topics    Alcohol use: Never    Drug use: No     I have reviewed and agree with the history as documented.    Review of Systems    Physical Exam  BP 98/54   Pulse 66   Resp 20   Ht 5' 9\" (1.753 m)   Wt 103 kg (227 lb 1.2 oz)   SpO2 (!) 89%   BMI 33.53 kg/m²     Physical Exam    ED Medications  Medications   sodium chloride 0.9 % bolus 1,000 mL (1,000 mL Intravenous New Bag 4/17/25 1345)       Intubation  Procedures    Notes      Final Diagnosis  Final diagnoses:   None       ED Provider  Electronically Signed by     Bethanie Quinones MD  04/17/25 2452    "

## 2025-04-17 NOTE — ED PROCEDURE NOTE
Procedure  Intubation    Date/Time: 4/17/2025 2:25 PM    Performed by: Osvaldo Black MD  Authorized by: Osvaldo Black MD    Patient location:  ED  Other Assisting Provider: Yes (comment) (Dr. Quinones)    Consent:     Consent obtained:  Emergent situation  Universal protocol:     Relevant documents present and verified: yes      Test results available and properly labeled: yes      Radiology Images displayed and confirmed.  If images not available, report reviewed: yes      Required blood products, implants, devices, and special equipment available: yes      Patient identity confirmed:  Provided demographic data  Pre-procedure details:     Mallampati score:  3    Pretreatment medications:  Etomidate    Paralytics:  Vecuronium  Indications:     Indications for intubation: respiratory distress, respiratory failure, airway protection and hypoxemia    Procedure details:     Preoxygenation:  Bag valve mask    CPR in progress: no      Intubation method:  Oral    Oral intubation technique:  Fiber optic and glidescope    Laryngoscope blade:  Mac 3 (Hyper angulated)    Tube size (mm):  7.5    Tube type:  Cuffed    Number of attempts:  1    Ventilation between attempts: no      Cricoid pressure: no      Tube visualized through cords: yes    Placement assessment:     ETT to lip:  23    Tube secured with:  ETT ahumada    Breath sounds:  Equal    Placement verification: chest rise, condensation, colorimetric ETCO2 device, CXR verification, direct visualization, equal breath sounds, ETCO2 detector, fiberoptic scope and tube exhalation      CXR findings:  ETT in proper place  Post-procedure details:     Patient tolerance of procedure:  Tolerated well, no immediate complications                   Osvaldo Black MD  04/17/25 7773

## 2025-04-17 NOTE — ASSESSMENT & PLAN NOTE
Lab Results   Component Value Date    EGFR 12 04/17/2025    EGFR 8 03/24/2025    EGFR 11 03/23/2025    CREATININE 4.62 (H) 04/17/2025    CREATININE 6.44 (H) 03/24/2025    CREATININE 4.99 (H) 03/23/2025     MWF schedule at [facility]  Nephrology consulted appreciate recommendations

## 2025-04-17 NOTE — ASSESSMENT & PLAN NOTE
-EF 35 to 40% with grade 2 diastolic dysfunction with moderate to severe aortic stenoses, mild to moderate MR, mild to moderate TR  - Patient with known CAD with recent stent placement at [facility] in preparation for TAVR

## 2025-04-17 NOTE — ASSESSMENT & PLAN NOTE
-Outpatient dialysis [facility]  - Outpatient schedule MWF and patient had dialysis yesterday  - Left upper extremity aneurysmal fistula without impending signs of rupture on exam with good thrill and bruit  - Per patient's wife patient had dialysis yesterday  -4/17-sodium, potassium, bicarbonate are appropriate.  Calcium was 8.  Troponin being trended.  Hemoglobin at 9.5.  Stable.    Plan  - Will plan for dialysis tomorrow-depending on patient course we will plan for intermittent versus CRRT tomorrow for clearance  - Currently metabolic state is stable and volume status stable but please call if there are any issues overnight to our on-call renal team.  - To note is on midodrine with dialysis

## 2025-04-17 NOTE — CONSULTS
NEPHROLOGY HOSPITAL CONSULTATION   Asad Galloway 65 y.o. male MRN: [id number]  Unit/Bed#: ICU 11 Encounter: [id number]    Brief History of Admission - 65 y.o. male with a past medical history of ESRD on hemodialysis at [facility], CAD with recent stenting for restenosis at [place], hypertension, hyperlipidemia, aortic stenoses awaiting TAVR, PVD, history of DVT, recent admission at [facility] for CTA and cardiac catheterization for TAVR workup reportedly received CRRT during that time, subsequently admitted to [facility] in March 2025 with agitation and encephalopathy who was admitted to [facility] on 4/17 after presenting with fall. A renal consultation is requested today for assistance in the management of ESRD.  Patient was recently discharged on March 24.  Now presenting back to the hospital after a fall.  Patient was with his wife in the car and his wife was at a doctor's appointment so when the wife went inside for the appointment, patient reportedly got out of the car to go to a nearby restaurant and unfortunately fell.  Was brought to the hospital for evaluation for trauma.    Patient's wife reports that he was in his usual state of health without any issues and it was his wife who needed to go to the doctor today.  But there are notes in the chart from urgent care at 9 AM that patient was there with a cough for 1 week and was felt to have URI conservatively managed.    In the hospital, patient was transported to [facility] as a trauma alert.  Was hypotensive to 60s systolic.  There was possible concern for possible bleed and therefore was sent for urgent CAT scan.  There was small subcutaneous hematoma in the right hip without evidence of active bleeding.  Cirrhosis with portal hypertension.  Patient ultimately required intubation.  Was transferred to the intensive care unit for further evaluation.  Was on Levophed initially.  Levophed was weaned off and blood pressures  remained appropriate.  When I saw the patient earlier this evening patient was intubated with plans for possible extubation.    Assessment & Plan  ESRD on dialysis ([facility])  -Outpatient dialysis [facility]  - Outpatient schedule MWF and patient had dialysis yesterday  - Left upper extremity aneurysmal fistula without impending signs of rupture on exam with good thrill and bruit  - Per patient's wife patient had dialysis yesterday  -4/17-sodium, potassium, bicarbonate are appropriate.  Calcium was 8.  Troponin being trended.  Hemoglobin at 9.5.  Stable.    Plan  - Will plan for dialysis tomorrow-depending on patient course we will plan for intermittent versus CRRT tomorrow for clearance  - Currently metabolic state is stable and volume status stable but please call if there are any issues overnight to our on-call renal team.  - To note is on midodrine with dialysis  Acute hypoxic respiratory failure (HCC)  -Initially intubated.  With plans for extubation tonight per ICU team  - CT scan of the chest-no PE.  Cardiomegaly with small right effusion and interstitial edema.  Fall  -Etiology unclear.  Workup in progress by primary team.  Anemia  Hemoglobin stable at 9.5.  Thrombocytopenia stable.  Acute embolism and thrombosis of right peroneal vein (HCC)  Per primary team  HFrEF (heart failure with reduced ejection fraction) ([facility])  -EF 35 to 40% with grade 2 diastolic dysfunction with moderate to severe aortic stenoses, mild to moderate MR, mild to moderate TR  - Patient with known CAD with recent stent placement at [facility] in preparation for TAVR  Shock ([facility])  Initially there was concern for shock and started on vasopressors.  When I saw the patient earlier this evening, his blood pressures were 120s to 140s systolic    COVID, flu, RSV negative  Blood cultures pending  Cirrhosis ([facility])  Per primary team    I have reviewed the nephrology recommendations including dialysis tomorrow unless urgently needed tonight,  with primary care attending in person and ICU advanced practitioner in person, and we are in agreement with renal plan including the information outlined above.    HISTORY OF PRESENT ILLNESS:  Requesting Physician: Franci Reis MD  Reason for Consult: ESRD    Asad Galloway is a 65 y.o. male with a past medical history of ESRD on hemodialysis at [facility], CAD with recent stenting for restenosis at [place], hypertension, hyperlipidemia, aortic stenoses awaiting TAVR, PVD, history of DVT, recent admission at [facility] for CTA and cardiac catheterization for TAVR workup reportedly received CRRT during that time, subsequently admitted to [facility] in March 2025 with agitation and encephalopathy who was admitted to [facility] on 4/17 after presenting with fall. A renal consultation is requested today for assistance in the management of ESRD.  Patient was recently discharged on March 24.  Now presenting back to the hospital after a fall.  Patient was with his wife in the car and his wife was at a doctor's appointment so when the wife went inside for the appointment, patient reportedly got out of the car to go to a nearby restaurant and unfortunately fell.  Was brought to the hospital for evaluation for trauma.    Patient's wife reports that he was in his usual state of health without any issues and it was his wife who needed to go to the doctor today.  But there are notes in the chart from urgent care at 9 AM that patient was there with a cough for 1 week and was felt to have URI conservatively managed.    In the hospital, patient was transported to [facility] as a trauma alert.  Was hypotensive to 60s systolic.  There was possible concern for possible bleed and therefore was sent for urgent CAT scan.  There was small subcutaneous hematoma in the right hip without evidence of active bleeding.  Cirrhosis with portal hypertension.  Patient ultimately required intubation.  Was transferred  to the intensive care unit for further evaluation.  Was on Levophed initially.  Levophed was weaned off and blood pressures remained appropriate.  When I saw the patient earlier this evening patient was intubated with plans for possible extubation.    PAST MEDICAL HISTORY:  Past Medical History:   Diagnosis Date    Cerebrovascular accident (CVA) due to thrombosis of left middle cerebral artery ([facility]) 07/29/2018    Chronic kidney disease     Coronary artery disease     Diabetes mellitus ([facility])     not on meds    Dialysis patient ([facility])     M-W-F    Fistula     left upper arm for hemodialyis    GERD (gastroesophageal reflux disease)     History of coronary artery stent placement     x3    Hypercholesteremia     Hyperlipidemia     Hypertension     Infectious viral hepatitis     B as child    Limb alert care status     no BP/IV left arm    Neuropathy     Nonhealing ulcer of heel ([facility]) 04/25/2023    Obesity     Osteomyelitis ([facility])     last assessed 11/4/16-per son not currently    Penetrating foot wound, left, initial encounter 01/19/2022    PVC's (premature ventricular contractions)     sees  Cardio    Stroke ([facility])     last weeof July 2018 [facility]    TIA (transient ischemic attack) 10/28/2018    Ulcer of left heel, limited to breakdown of skin ([facility]) 06/13/2024    Wears dentures     Wears glasses        PAST SURGICAL HISTORY:  Past Surgical History:   Procedure Laterality Date    ABDOMINAL SURGERY      CARDIAC CATHETERIZATION N/A 05/02/2022    Procedure: Cardiac Coronary Angiogram;  Surgeon: Sam Davis MD;  [facility]: [facility] CARDIAC [facility];  Service: Cardiology    CARDIAC CATHETERIZATION N/A 05/02/2022    Procedure: Cardiac pci;  Surgeon: Sam Davis MD;  [facility]: [facility]ARDIAC [facility];  Service: Cardiology    CARDIAC CATHETERIZATION  02/01/2023    Procedure: Cardiac catheterization;  Surgeon: Sam Davis MD;  Location: [facility];  Service: Cardiology    CARDIAC CATHETERIZATION N/A 02/01/2023  "   Procedure: Cardiac pci;  Surgeon: Sam Davis MD;  Location: [facility]ARD[facility];  Service: Cardiology    CARDIAC CATHETERIZATION N/A 02/01/2023    Procedure: Cardiac Coronary Angiogram;  Surgeon: Sam Davis MD;  Location: [facility]ARD[facility];  Service: Cardiology    CARDIAC CATHETERIZATION N/A 02/01/2023    Procedure: Cardiac other-IVUS;  Surgeon: Sam Davis MD;  Location: [facility];  Service: Cardiology    CHOLECYSTECTOMY      Percutaneous    COLONOSCOPY      CORONARY ANGIOPLASTY WITH STENT PLACEMENT      CYSTOSCOPY      HEMODIALYSIS ADULT  1/22/2024    HEMODIALYSIS ADULT  01/24/2024    IR LOWER EXTREMITY ANGIOGRAM  9/29/2023    IR LOWER EXTREMITY ANGIOGRAM  02/26/2024    OTHER SURGICAL HISTORY      \"stimulator to control bowel movements\"    HI ESOPHAGOGASTRODUODENOSCOPY TRANSORAL DIAGNOSTIC N/A 09/27/2016    Procedure: ESOPHAGOGASTRODUODENOSCOPY (EGD);  Surgeon: Adele Rowe MD;  Location: [facility] GI LAB;  Service: Gastroenterology    HI LAPAROSCOPY SURG CHOLECYSTECTOMY N/A 02/29/2016    Procedure: LAPAROSCOPIC CHOLECYSTECTOMY ;  Surgeon: Cliff Roman DO;  Location: [facility] Main OR;  Service: General    HI SLCTV CATHJ 3RD+ ORD SLCTV ABDL PEL/LXTR BRNCH Left 9/29/2023    Procedure: Left leg angiogram with intervention;  Surgeon: Michelle Galvan MD;  Location: [facility];  Service: Vascular    HI SLCTV CATHJ 3RD+ ORD SLCTV ABDL PEL/LXTR BRNCH Left 02/26/2024    Procedure: Left leg angiogram antegrade access, left popliteal angioplasty;  Surgeon: Michelle Galvan MD;  Location: [facility];  Service: Vascular    ROTATOR CUFF REPAIR Right     SPINAL CORD STIMULATOR IMPLANT      \"Medtronic interstim model # 3058- in lower back to control bowel movements-currently turned off-battery is dead\"    TOE AMPUTATION Right 10/28/2016    Procedure: 3RD TOE AMPUTATION ;  Surgeon: Anjel Salas DPM;  Location: [facility] OR;  Service:        ALLERGIES:  Allergies   Allergen Reactions    Penicillin G Hives "     Other Reaction(s): Unknown/HD has no allergy to PCN    Shellfish-Derived Products - Food Allergy Rash     Other reaction(s): Unknown      Other Reaction(s): denies any allergy       SOCIAL HISTORY:  Social History     Substance and Sexual Activity   Alcohol Use Never     Social History     Substance and Sexual Activity   Drug Use No     Social History     Tobacco Use   Smoking Status Never   Smokeless Tobacco Never       FAMILY HISTORY:  Family History   Problem Relation Age of Onset    Leukemia Mother     Liver disease Mother     Lung cancer Mother         heavy smoker - 3 ppd    Heart disease Father     Liver disease Father     Diabetes type I Father     Multiple myeloma Sister     Breast cancer Sister     Urolithiasis Family     Alcohol abuse Neg Hx     Depression Neg Hx     Drug abuse Neg Hx     Substance Abuse Neg Hx     Mental illness Neg Hx        MEDICATIONS:    Current Facility-Administered Medications:     chlorhexidine (PERIDEX) 0.12 % oral rinse 15 mL, 15 mL, Mouth/Throat, Q12H [facility], Almita Rodriguez MD    [Held by provider] cinacalcet (SENSIPAR) tablet 30 mg, 30 mg, Oral, Daily With Breakfast, Almita Rodriguez MD    levalbuterol (XOPENEX) inhalation solution 0.63 mg, 0.63 mg, Nebulization, Q6H PRN, Franci Reis MD, 0.63 mg at 04/17/25 8428    [Held by provider] melatonin tablet 3 mg, 3 mg, Oral, HS, Almita Rodriguez MD    REVIEW OF SYSTEMS:  Constitutional: Negative for fatigue, anorexia, fever, chills, diaphoresis  HENT: Negative for postnasal drip  Eyes: Negative for visual disturbance.   Respiratory: Negative for cough, shortness of breath and wheezing.   Cardiovascular: Negative for chest pain, palpitations and leg swelling.   Gastrointestinal: Negative for abdominal pain, constipation, diarrhea, nausea and vomiting.   Genitourinary: No dysuria, hematuria  Endocrine: Negative for polyuria.   Musculoskeletal: Negative for arthralgias, back pain and joint  swelling.   Skin: Negative for rash.   Neurological: Negative for focal weakness, headaches, dizziness.  Hematological: Negative for easy bruising or bleeding.  Psychiatric/Behavioral: Negative for confusion and sleep disturbance.   All the systems were reviewed and were negative except as documented on the HPI.    PHYSICAL EXAM:  Current Weight: Weight - Scale: 103 kg (227 lb)  First Weight: Weight - Scale: 103 kg (227 lb 1.2 oz)  Vitals:    04/17/25 1748 04/17/25 1800 04/17/25 1809 04/17/25 1815   BP:  115/67  107/59   Pulse:  89  85   Resp:  22  17   Temp:    99.1 °F (37.3 °C)   TempSrc:    Axillary   SpO2: 100% 100% 100% 99%   Weight:       Height:           Intake/Output Summary (Last 24 hours) at 4/17/2025 1842  Last data filed at 4/17/2025 1712  Gross per 24 hour   Intake 760.98 ml   Output --   Net 760.98 ml     Physical Exam  General: Intubated  Skin: Bleeding noted from head wound  Eyes: anicteric sclera  ENT: ET tube in place coarse breath sounds bilaterally  Neck: supple  Chest:   CVS: s1s2, no murmur, no gallop, no rub  Abdomen: soft, nontender, nl sounds, mild distention  Extremities: no significant pitting edema LE b/l  Neuro: Cannot currently assess  Psych: Cannot currently assess      Invasive Devices:   Urethral Catheter Temperature probe (Active)     Lab Results:   Results from last 7 days   Lab Units 04/17/25  1440 04/17/25  1419 04/17/25  1348 04/17/25  1347 04/17/25  1020   WBC Thousand/uL  --   --   --  5.07 6.09   HEMOGLOBIN g/dL  --   --   --  9.5* 10.0*   I STAT HEMOGLOBIN g/dl 10.2* 9.9* 10.5*  --   --    HEMATOCRIT %  --   --   --  31.3* 33.1*   HEMATOCRIT, ISTAT % 30* 29* 31*  --   --    PLATELETS Thousands/uL  --   --   --  108* 105*   POTASSIUM mmol/L  --   --   --  4.3  --    CHLORIDE mmol/L  --   --   --  96  --    CO2 mmol/L  --   --   --  32  --    CO2, I-STAT mmol/L 21 31 33*  --   --    BUN mg/dL  --   --   --  25  --    CREATININE mg/dL  --   --   --  4.62*  --    CALCIUM mg/dL   "--   --   --  8.0*  --    ALK PHOS U/L  --   --   --  126*  --    ALT U/L  --   --   --  9  --    AST U/L  --   --   --  13  --    GLUCOSE, ISTAT mg/dl 67 79 89  --   --        Portions of the record may have been created with voice recognition software. Occasional wrong word or \"sound a like\" substitutions may have occurred due to the inherent limitations of voice recognition software. Read the chart carefully and recognize, using context, where substitutions have occurred.If you have any questions, please contact the dictating provider.    "

## 2025-04-17 NOTE — CASE MANAGEMENT
Case Management ED Progress Note    Patient name Asad Galloway  Location [facility] Pool Room/[facility] MRN [id number]  : 1960 Date 2025        OBJECTIVE:    Chief Complaint: Acute hypoxic respiratory failure (HCC)   Patient Class: Emergency  Preferred Pharmacy:   [id number] - [place] - [address]  [address]  [place] [address]  Phone: [phone] Fax: [phone]    Primary Care Provider: BRITTANY Allen    Primary Insurance: MEDICARE  Secondary Insurance:     ED Progress Note:  CM responded to trauma alert. Patient was transported into trauma bay by [facility] for eval. Patient initially responsive.     Met with family in ED waiting room-- general update provided.     CM will follow and update screening, assessment, and discharge planning as appropriate.

## 2025-04-17 NOTE — H&P
"H&P - Trauma   Name: Asad Galloway 65 y.o. male I MRN: [id number]  Unit/Bed#: TR-02 I Date of Admission: 4/17/2025   Date of Service: 4/17/2025 I Hospital Day: 0     Assessment & Plan  Fall  Patient suffered fall in a parking lot.  Story is unclear though it is believed to be associated with hypotension due to patient being found with a blood pressure in the 60s by EMS.  Primary and secondary evaluation were negative for acute traumatic injury  Trauma will continue to follow for tertiary evaluation on 4/18  See below  Shock (HCC)  Patient received 2 units of whole blood and was placed on Levophed for BP assistance  Bedside ultrasound completed that showed reduced EF  Admit to medical critical care for continued monitoring and management  Echocardiogram ordered  Acute hypoxic respiratory failure (HCC)  Patient became acutely hypoxic during initial evaluation requiring intubation  CXR concerning for acute CHF exacerbation  Admitted to medical critical care for continued monitoring and workup    Trauma Alert: Level A   Model of Arrival: Ambulance    Trauma Team: Attending Efren, Fellow Jeanne, and PAU Ulloa  Consultants:     None     History of Present Illness   Chief Complaint: \"I fell\"  Mechanism:Fall     Asad Galloway is a 65 y.o. male with history of heart failure and ESRD that he is on dialysis for, presenting today for evaluation after suffering a fall    EMS reports that patient was in the parking lot when he fell.  Details associated with the fall are unclear though it is believed to be associated with syncope due to patient being found in the 60s systolically by EMS.    On arrival to the trauma bay patient had a GCS of 14, was intermittently following commands, and was agitated towards staff at times.  He complained of some head pain but denied any numbness, tingling, weakness.  He was noted to have a laceration on his nose.  During initial evaluation he was intermittently hypoxic though this improved with " repositioning.    CT imaging was completed after initial assessment and there was concern for intra-abdominal fluid/blood.  Patient then became hypotensive with systolics in the 60s.  He was taken back to the trauma bay emergently where CVC introducer was placed and 2 units of whole blood were transfused.  During this time he also became hypoxic and required intubation.    Review of Systems   Unable to perform ROS: Mental status change   Respiratory:  Negative for shortness of breath.    Skin:  Positive for wound (Nasal Laceration).   Neurological:  Positive for headaches.     Medical History Review: I have reviewed the patient's PMH, PSH, Social History, Family History, Meds, and Allergies   Immunization History   Administered Date(s) Administered    COVID-19 MODERNA VACC 0.5 ML IM 11/08/2021    COVID-19 Moderna mRNA Vaccine 12 Yr+ 50 mcg/0.5 mL (Spikevax) 11/01/2023    COVID-19 PFIZER VACCINE 0.3 ML IM 03/23/2021, 04/21/2021    COVID-19 Pfizer Vac BIVALENT Mert-sucrose 12 Yr+ IM 12/03/2022    Hep B, Dialysis 05/19/2021    Hep B, adult 05/19/2021, 06/16/2021, 07/21/2021, 11/26/2021    Hepatitis B Vaccine (Recombinant), Cpg Adjuvanted 06/16/2021, 07/21/2021, 11/26/2021    INFLUENZA 10/04/2021, 11/02/2021, 10/01/2022    Influenza Injectable, MDCK, Preservative Free, 0.5 mL 10/14/2024    Influenza Quadrivalent Preservative Free 3 years and older IM 10/04/2021, 10/01/2022    Influenza Quadrivalent Recombinant Preservative Free IM 10/16/2023    Influenza, recombinant, quadrivalent,injectable, preservative free 11/02/2021    Pneumococcal Conjugate Vaccine 20-valent (Pcv20), Polysace 11/01/2023, 11/18/2024    Pneumococcal Polysaccharide PPV23 09/21/2018    Tdap 10/24/2016, 01/30/2022     Last Tetanus: See above    No data recordedISAR Score: Did you order a geriatric consult if the score was 2 or greater?: Per medical team       Objective :  Temp:  [97 °F (36.1 °C)-97.2 °F (36.2 °C)] 97.2 °F (36.2 °C)  HR:  [66-83]  78  BP: ()/(35-70) 146/64  Resp:  [18-20] 20  SpO2:  [84 %-100 %] 100 %  O2 Device: Non-rebreather mask    Initial Vitals:   Temperature: (!) 97.1 °F (36.2 °C) (04/17/25 1340)  Pulse: 66 (04/17/25 1340)  Respirations: 20 (04/17/25 1340)  Blood Pressure: 98/54 (04/17/25 1340)  Arterial Line BP: 54/1 (04/17/25 1442)    Primary Survey:   Airway:        Status: patent;        Pre-hospital Interventions: none        Hospital Interventions: none  Breathing:        Pre-hospital Interventions: none       Effort: normal       Right breath sounds: normal       Left breath sounds: normal  Circulation: Hospital Interventions: Fistula in left arm with thrill       Rhythm: regular       Rate: regular   Right Pulses Left Pulses    R radial: 1+  R femoral: 2+  R pedal: 1+     L radial: 1+  L femoral: 2+  L pedal: 1+       Disability:        GCS: Eye: 4; Verbal: 4 Motor: 6 Total: 14       Right Pupil: 4 mm;  round;  reactive         Left Pupil:  4 mm;  round;  reactive      R Motor Strength L Motor Strength    R : 5/5  R dorsiflex: 5/5  R plantarflex: 5/5 L : 5/5  L dorsiflex: 5/5  L plantarflex: 5/5        Sensory:  No sensory deficit  Exposure:       Completed: Yes      Secondary Survey:  Physical Exam  Constitutional:       General: He is in acute distress.      Appearance: He is ill-appearing.   HENT:      Head: Normocephalic.      Nose:      Comments: Nasal laceration--stellate     Mouth/Throat:      Mouth: Mucous membranes are moist.      Pharynx: Oropharynx is clear.   Eyes:      Extraocular Movements: Extraocular movements intact.      Pupils: Pupils are equal, round, and reactive to light.   Cardiovascular:      Rate and Rhythm: Normal rate and regular rhythm.      Pulses: Normal pulses.      Heart sounds: Normal heart sounds.   Pulmonary:      Effort: Pulmonary effort is normal. No respiratory distress.      Breath sounds: No stridor. Rhonchi present. No wheezing or rales.   Chest:      Chest wall: No  tenderness.   Abdominal:      General: Abdomen is flat. Bowel sounds are normal. There is no distension.      Palpations: Abdomen is soft.      Tenderness: There is no abdominal tenderness. There is no guarding.   Genitourinary:     Comments: Pelvis is stable.  Musculoskeletal:      Cervical back: Normal range of motion and neck supple.      Comments: No C, T, L-spine tenderness.  No palpable step-offs.  Moving all extremities.  Left upper extremity has fistula with positive thrill.   Skin:     General: Skin is warm.      Capillary Refill: Capillary refill takes less than 2 seconds.   Neurological:      Mental Status: He is alert. He is disoriented.      Sensory: No sensory deficit.      Motor: No weakness.         Lines/Drains/Airways       Active Status       Name Placement date Placement time Site Days    CVC Central Lines 04/17/25 Single Right Femoral 04/17/25  1432  Femoral  less than 1    Arterial Line 04/17/25 Right Radial 04/17/25  1444  Radial  less than 1    ETT  Oral 7.5 mm 04/17/25  1427  -- less than 1    ETT  Cuffed 7.5 mm 04/17/25  1425  -- less than 1    NG/OG/Enteral Tube Orogastric Center mouth 04/17/25  1434  Center mouth  less than 1    Urethral Catheter Temperature probe 04/17/25  1439  Temperature probe  less than 1                    Lab Results: I have reviewed the following results:  Recent Labs     04/17/25  1347 04/17/25  1348 04/17/25  1419   WBC 5.07  --   --    HGB 9.5*   < > 9.9*   HCT 31.3*   < > 29*   *  --   --    SODIUM 138  --   --    K 4.3  --   --    CL 96  --   --    CO2 32   < > 31   BUN 25  --   --    CREATININE 4.62*  --   --    GLUC 89  --   --    CAIONIZED  --    < > 0.85*   AST 13  --   --    ALT 9  --   --    ALB 3.8  --   --    TBILI 0.88  --   --    ALKPHOS 126*  --   --    PTT 38*  --   --    INR 1.50*  --   --    HSTNI0 130*  --   --    LACTICACID 1.4  --   --     < > = values in this interval not displayed.       Imaging Results: I have personally reviewed  pertinent images saved in PACS. CT scan findings (and other pertinent positive findings on images) were discussed with radiology. My interpretation of the images/reports are as follows:  Chest Xray(s): Pulmonary congestion with trace effusions   FAST exam(s): negative for acute findings   CT Scan(s): negative for acute findings   Additional Xray(s): N/A     Other Studies: Other Study Results Review: No additional pertinent studies reviewed.

## 2025-04-17 NOTE — CONSULTS
Consultation - Critical Care/ICU   Name: Asad Galloway 65 y.o. male I MRN: [id number]  Unit/Bed#: ICU 11 I Date of Admission: 4/17/2025   Date of Service: 4/17/2025 I Hospital Day: 0   Consults  Physician Requesting Evaluation: Franci Reis MD   Reason for Evaluation / Principal Problem: hypoxic respiratory failure        Assessment & Plan  Mixed hyperlipidemia  Continue home Lipitor 40    Anemia    Multifactorial etiology-ESRD, chronic disease  On admission Hb 9.5, s/p 2 unit whole blood for suspected hemorrhagic shock  Check Folate, B2 given macrocytosis    ESRD on dialysis ([facility])  Lab Results   Component Value Date    EGFR 12 04/17/2025    EGFR 8 03/24/2025    EGFR 11 03/23/2025    CREATININE 4.62 (H) 04/17/2025    CREATININE 6.44 (H) 03/24/2025    CREATININE 4.99 (H) 03/23/2025     MWF schedule at [facility]  Access LUE AVF  Nephrology consulted appreciate recommendations    CAD, multiple vessel  CAD S/P multiple stents with history over the past years, most recently in 2025 2/27/2025 [facility]: Mid circumflex 10%, first obtuse marginal 100%, first %, severe in-stent restenosis of LAD and  of OM1 ([facility]   3/17/2025 [facility] LM normal, proximal LAD 90%, circumflex luminal irregularities, first obtuse marginal 100%, right system not described, successful balloon angioplasty to proximal LAD with 0% residual stenosis ([facility])  He is on DAPT, statin    Hold medications while Intubated and NPO    Acute hypoxic respiratory failure (HCC)  Patient was noted to be hypoxic at the CT when laid flat  Reportedly has been coughing for 5 days now  Patient intubated at the ED  COVID flu RSV pending  WBC normal, procalcitonin pending  CT chest no large PE, groundglass opacity subtle bibasilar infiltrate suspect pulmonary edema-       Plan  As medical history  Vendt- AC 16/450/50/40      - sedation Fentanyl       - Daily SAT, SBT            Broad spectrum abx  Follow u covid/flu/rsv, urine antigens and  MRSA  Volume management with dialysis      Ischemic cardiomyopathy  Echo March 2025 LVEF 35-40%, global hypokinesis with regional variations, moderately reduced RV systolic function, severe left atrial dilation moderate to severely calcified aortic valve with moderate to severe aortic stenosis   Hold home GDMT in the setting of shock  Aortic stenosis  Severe AS, scheduled for TAVR in April 2025 at [facility]  Acute embolism and thrombosis of right peroneal vein (HCC)  Hx of upper extremity DVT in right upper extremity of eliquis 2.5 bid  Can switch to heparin gtte if anticipated procedures    Type 2 diabetes mellitus ([facility])    Lab Results   Component Value Date    HGBA1C 5.4 04/10/2025     Controlled  Will continue SSI q 6 while intubated  Hypoglycemia protocol  Shock ([facility])  Hx of chronic hypotension on midodrine.  Presents following fall, no evidence of overt bleeding . Hb 9  Presents with syncope, profound hypotension   Hx of ischemic cardiomyopathy with EF35%, Cirrhosis  No evidence of sepsis  CTA chest - no evidence of PE  CT abdomen-Small subcutaneous hematoma in the right hip without evidence of active bleeding   Patient altered ,hypervolemia and well perfused, CXR pulmonary vascular congestion and pleural effusion   Etiology unclear  Lactic acid normal       Plan   Continue pressors with Levo goal MAP>65, wean as able  Start abx with cefepime and vanco  Follow up BC and other infectious work up  Follow up repeat Echo  HFrEF (heart failure with reduced ejection fraction) ([facility])  Wt Readings from Last 3 Encounters:   04/17/25 103 kg (227 lb)   04/09/25 100 kg (221 lb)   04/01/25 97.1 kg (214 lb)        Combined systolic and diastolic HF, [facility]  Last echo March 2025- Ef 30-35% with grade III DD  Medications;Toprol-XL 25 twice daily, Entresto 24-26 twice daily with Volume management: Dialysis  For this admission volume up with concern for cardiogenic shock     BNP >4700  Trop Ho 130   CXR Pulmonary vascular  congestion and      Plan   Volume per Dialysis  Continue pressor support, wean able  Hold home GDMT in the setting of hypotension           Fall  Presented following fall/mechanical  Found hypotensive  No syncope  Hypotension  Trauma scan without fracture  Fall precautions  PT/OT  Disposition: Critical care    History of Present Illness   Asad Galloway is a 65 y.o. with past medical history of CVA, CAD status post multiple stents, ischemic cardiomyopathy EF 35%, HFpEF, end-stage renal disease on dialysis MWF, PVD, chronic anemia, upper extremity DVT on Eliquis, who presents for a fall after dialysis. According to EMS was hypotensive  Presented as trauma level A. Concern for hemorrhagic shock. He was transfused 2 units of whole unmatched blood. While being transported to CT became hypoxic and was intubated for airway protection. He was lined at the ED and started or norepinephrine. His trauma scans which were negative for fractures, no concealed bleeding on CT abdomen. His CTA chest, no PE, there mild pulmonary edema and small right pleural effusion   Patient is admitted to ICU shock and acute hypoxic respiratory failure requiring intubation       History obtained from chart review.      Historical Information   Past Medical History:  07/29/2018: Cerebrovascular accident (CVA) due to thrombosis of left   middle cerebral artery (HCC)  No date: Chronic kidney disease  No date: Coronary artery disease  No date: Diabetes mellitus (HCC)      Comment:  not on meds  No date: Dialysis patient (HCC)      Comment:  M-W-F  No date: Fistula      Comment:  left upper arm for hemodialyis  No date: GERD (gastroesophageal reflux disease)  No date: History of coronary artery stent placement      Comment:  x3  No date: Hypercholesteremia  No date: Hyperlipidemia  No date: Hypertension  No date: Infectious viral hepatitis      Comment:  B as child  No date: Limb alert care status      Comment:  no BP/IV left arm  No date:  Neuropathy  04/25/2023: Nonhealing ulcer of heel (HCC)  No date: Obesity  No date: Osteomyelitis (HCC)      Comment:  last assessed 11/4/16-per son not currently  01/19/2022: Penetrating foot wound, left, initial encounter  No date: PVC's (premature ventricular contractions)      Comment:  sees [facility]  No date: Stroke (HCC)      Comment:  last weeof July 2018 [facility]  10/28/2018: TIA (transient ischemic attack)  06/13/2024: Ulcer of left heel, limited to breakdown of skin (HCC)  No date: Wears dentures  No date: Wears glasses Past Surgical History:  No date: ABDOMINAL SURGERY  05/02/2022: CARDIAC CATHETERIZATION; N/A      Comment:  Procedure: Cardiac Coronary Angiogram;  Surgeon: Sam Davis MD;  [facility]: [facility];  Service:                Cardiology  05/02/2022: CARDIAC CATHETERIZATION; N/A      Comment:  Procedure: Cardiac pci;  Surgeon: Sam Davis MD;                 [facility]: [facility] CARDIAC [facility];  Service: Cardiology  02/01/2023: CARDIAC CATHETERIZATION      Comment:  Procedure: Cardiac catheterization;  Surgeon: Sam Davis MD;  Location: [facility] CARDIAC [facility];  Service:                Cardiology  02/01/2023: CARDIAC CATHETERIZATION; N/A      Comment:  Procedure: Cardiac pci;  Surgeon: Sam Davis MD;                 Location: [facility]ARD[facility];  Service: Cardiology  02/01/2023: CARDIAC CATHETERIZATION; N/A      Comment:  Procedure: Cardiac Coronary Angiogram;  Surgeon: Sam Davis MD;  Location: [facility];  Service:                Cardiology  02/01/2023: CARDIAC CATHETERIZATION; N/A      Comment:  Procedure: Cardiac other-IVUS;  Surgeon: Sam Davis MD;  Location: [facility] CARDIAC [facility];  Service: Cardiology  No date: CHOLECYSTECTOMY      Comment:  Percutaneous  No date: COLONOSCOPY  No date: CORONARY ANGIOPLASTY WITH STENT PLACEMENT  No date: CYSTOSCOPY  1/22/2024: HEMODIALYSIS ADULT  01/24/2024:  "HEMODIALYSIS ADULT  9/29/2023: IR LOWER EXTREMITY ANGIOGRAM  02/26/2024: IR LOWER EXTREMITY ANGIOGRAM  No date: OTHER SURGICAL HISTORY      Comment:  \"stimulator to control bowel movements\"  09/27/2016: WY ESOPHAGOGASTRODUODENOSCOPY TRANSORAL DIAGNOSTIC; N/A      Comment:  Procedure: ESOPHAGOGASTRODUODENOSCOPY (EGD);  Surgeon:                Adele Rowe MD;  Location: [facility] GI LAB;  Service:                Gastroenterology  02/29/2016: WY LAPAROSCOPY SURG CHOLECYSTECTOMY; N/A      Comment:  Procedure: LAPAROSCOPIC CHOLECYSTECTOMY ;  Surgeon:                Cliff Roman DO;  Location: [facility] Main OR;  Service:                General  9/29/2023: WY SLCTV CATHJ 3RD+ ORD SLCTV ABDL PEL/LXTR BRNCH; Left      Comment:  Procedure: Left leg angiogram with intervention;                 Surgeon: Michelle Galvan MD;  Location: [facility] MAIN OR;                Service: Vascular  02/26/2024: W[place] SLCTV CATHJ 3RD+ ORD SLCTV ABDL PEL/LXTR BRNCH; Left      Comment:  Procedure: Left leg angiogram antegrade access, left                popliteal angioplasty;  Surgeon: Michelle Galvan MD;               Location: [facility];  Service: Vascular  No date: ROTATOR CUFF REPAIR; Right  No date: SPINAL CORD STIMULATOR IMPLANT      Comment:  \"Medtronic interstim model # 3058- in lower back to                control bowel movements-currently turned off-battery is                dead\"  10/28/2016: TOE AMPUTATION; Right      Comment:  Procedure: 3RD TOE AMPUTATION ;  Surgeon: Anjel Salas DPM;  Location: [facility];  Service:    Current Outpatient Medications   Medication Instructions    apixaban (ELIQUIS) 2.5 mg, Oral, 2 times daily    aspirin 81 mg, Daily    atorvastatin (LIPITOR) 40 mg, Oral, Daily with dinner    cinacalcet (SENSIPAR) 30 mg    doxycycline hyclate (VIBRAMYCIN) 100 mg, Oral, Every 12 hours scheduled    melatonin 3 mg    metoprolol succinate (TOPROL-XL) 25 mg, Oral, 2 times daily    midodrine (PROAMATINE) " 2.5 mg    midodrine (PROAMATINE) 10 mg    MIDODRINE HCL PO 5 mg    mupirocin (BACTROBAN) 2 % ointment Topical, 2 times daily    prasugrel (EFFIENT) 10 mg, Oral, Daily    sacubitril-valsartan (Entresto) 24-26 MG TABS 1 tablet, Oral, 2 times daily    Sevelamer Carbonate 2.4 g 1 packet, 3 times daily    Vitamin D3 1.25 MG (88265 UT) capsule TAKE 1 CAPSULE BY MOUTH ONE TIME PER WEEK    Allergies   Allergen Reactions    Penicillin G Hives     Other Reaction(s): Unknown/HD has no allergy to PCN    Shellfish-Derived Products - Food Allergy Rash     Other reaction(s): Unknown      Other Reaction(s): denies any allergy      Social History     Tobacco Use    Smoking status: Never    Smokeless tobacco: Never   Vaping Use    Vaping status: Never Used   Substance Use Topics    Alcohol use: Never    Drug use: No    Family History   Problem Relation Age of Onset    Leukemia Mother     Liver disease Mother     Lung cancer Mother         heavy smoker - 3 ppd    Heart disease Father     Liver disease Father     Diabetes type I Father     Multiple myeloma Sister     Breast cancer Sister     Urolithiasis Family     Alcohol abuse Neg Hx     Depression Neg Hx     Drug abuse Neg Hx     Substance Abuse Neg Hx     Mental illness Neg Hx           Objective :                   Vitals I/O      Most Recent Min/Max in 24hrs   Temp (!) 97.2 °F (36.2 °C) Temp  Min: 97 °F (36.1 °C)  Max: 97.2 °F (36.2 °C)   Pulse 94 Pulse  Min: 66  Max: 94   Resp (!) 24 Resp  Min: 18  Max: 24   /60 BP  Min: 65/39  Max: 157/72   O2 Sat 99 % SpO2  Min: 84 %  Max: 100 %      Intake/Output Summary (Last 24 hours) at 4/17/2025 1723  Last data filed at 4/17/2025 1600  Gross per 24 hour   Intake 751.54 ml   Output --   Net 751.54 ml       Diet NPO    Invasive Monitoring           Physical Exam   Physical Exam  Vitals reviewed.   HENT:      Head:      Comments: Bruise on nose  Cardiovascular:      Rate and Rhythm: Normal rate and regular rhythm.   Abdominal:       Palpations: Abdomen is soft.      Tenderness: There is no abdominal tenderness.   Constitutional:       Interventions: He is sedated and intubated.   Pulmonary:      Effort: No accessory muscle usage, respiratory distress or accessory muscle usage. He is intubated.      Comments: Decrease air entry bilateral lower lungs  Neurological:      Comments: Sedated    Genitourinary/Anorectal:  Bo present.        Diagnostic Studies        Lab Results: I have reviewed the following results:     Medications:  Scheduled PRN   chlorhexidine, 15 mL, Q12H MICH  [Held by provider] cinacalcet, 30 mg, Daily With Breakfast  [Held by provider] melatonin, 3 mg, HS          Continuous    fentaNYL, 25 mcg/hr, Last Rate: Stopped (04/17/25 1600)  norepinephrine, 1-30 mcg/min, Last Rate: 2 mcg/min (04/17/25 1607)         Labs:   CBC    Recent Labs     04/17/25  1020 04/17/25  1347 04/17/25  1348 04/17/25  1419 04/17/25  1440   WBC 6.09 5.07  --   --   --    HGB 10.0* 9.5*   < > 9.9* 10.2*   HCT 33.1* 31.3*   < > 29* 30*   * 108*  --   --   --     < > = values in this interval not displayed.     BMP    Recent Labs     04/17/25  1347 04/17/25  1348 04/17/25  1419 04/17/25  1440   SODIUM 138  --   --   --    K 4.3  --   --   --    CL 96  --   --   --    CO2 32   < > 31 21   AGAP 10  --   --   --    BUN 25  --   --   --    CREATININE 4.62*  --   --   --    CALCIUM 8.0*  --   --   --     < > = values in this interval not displayed.       Coags    Recent Labs     04/17/25  1347   INR 1.50*   PTT 38*        Additional Electrolytes  Recent Labs     04/17/25  1419 04/17/25  1440   CAIONIZED 0.85* 0.65*          Blood Gas    No recent results  Recent Labs     04/17/25  1347   PHVEN 7.397   ARP3JJZ 46.1   PO2VEN 30.8*   MIL7CDC 27.7   BEVEN 2.4   [id number] 52.7*    LFTs  Recent Labs     04/17/25  1347   ALT 9   AST 13   ALKPHOS 126*   ALB 3.8   TBILI 0.88       Infectious  No recent results  Glucose  Recent Labs     04/17/25  1347   GLUC 89

## 2025-04-18 ENCOUNTER — APPOINTMENT (INPATIENT)
Dept: DIALYSIS | Facility: HOSPITAL | Age: 65
DRG: 314 | End: 2025-04-18
Payer: MEDICARE

## 2025-04-18 ENCOUNTER — APPOINTMENT (INPATIENT)
Dept: RADIOLOGY | Facility: HOSPITAL | Age: 65
DRG: 314 | End: 2025-04-18
Payer: MEDICARE

## 2025-04-18 PROBLEM — J96.01 ACUTE HYPOXIC RESPIRATORY FAILURE (HCC): Status: RESOLVED | Noted: 2023-01-29 | Resolved: 2025-04-18

## 2025-04-18 PROBLEM — R57.9 SHOCK (HCC): Status: RESOLVED | Noted: 2025-04-17 | Resolved: 2025-04-18

## 2025-04-18 LAB
ABO GROUP BLD BPU: NORMAL
ABO GROUP BLD BPU: NORMAL
ALBUMIN SERPL BCG-MCNC: 3.7 G/DL (ref 3.5–5)
ALP SERPL-CCNC: 130 U/L (ref 34–104)
ALT SERPL W P-5'-P-CCNC: 9 U/L (ref 7–52)
ANION GAP SERPL CALCULATED.3IONS-SCNC: 13 MMOL/L (ref 4–13)
ANION GAP SERPL CALCULATED.3IONS-SCNC: 9 MMOL/L (ref 4–13)
AST SERPL W P-5'-P-CCNC: 13 U/L (ref 13–39)
BASE EX.OXY STD BLDV CALC-SCNC: 90.3 % (ref 60–80)
BASE EXCESS BLDV CALC-SCNC: -2.3 MMOL/L
BASOPHILS # BLD AUTO: 0.01 THOUSANDS/ÂΜL (ref 0–0.1)
BASOPHILS NFR BLD AUTO: 0 % (ref 0–1)
BILIRUB SERPL-MCNC: 0.97 MG/DL (ref 0.2–1)
BPU ID: NORMAL
BPU ID: NORMAL
BUN SERPL-MCNC: 28 MG/DL (ref 5–25)
BUN SERPL-MCNC: 33 MG/DL (ref 5–25)
CA-I BLD-SCNC: 0.99 MMOL/L (ref 1.12–1.32)
CALCIUM SERPL-MCNC: 7.8 MG/DL (ref 8.4–10.2)
CALCIUM SERPL-MCNC: 8 MG/DL (ref 8.4–10.2)
CHLORIDE SERPL-SCNC: 95 MMOL/L (ref 96–108)
CHLORIDE SERPL-SCNC: 96 MMOL/L (ref 96–108)
CO2 SERPL-SCNC: 27 MMOL/L (ref 21–32)
CO2 SERPL-SCNC: 28 MMOL/L (ref 21–32)
CREAT SERPL-MCNC: 4.36 MG/DL (ref 0.6–1.3)
CREAT SERPL-MCNC: 5.3 MG/DL (ref 0.6–1.3)
EOSINOPHIL # BLD AUTO: 0 THOUSAND/ÂΜL (ref 0–0.61)
EOSINOPHIL NFR BLD AUTO: 0 % (ref 0–6)
ERYTHROCYTE [DISTWIDTH] IN BLOOD BY AUTOMATED COUNT: 18.3 % (ref 11.6–15.1)
ERYTHROCYTE [DISTWIDTH] IN BLOOD BY AUTOMATED COUNT: 18.8 % (ref 11.6–15.1)
FOLATE SERPL-MCNC: 13.6 NG/ML
GFR SERPL CREATININE-BSD FRML MDRD: 10 ML/MIN/1.73SQ M
GFR SERPL CREATININE-BSD FRML MDRD: 13 ML/MIN/1.73SQ M
GLUCOSE SERPL-MCNC: 77 MG/DL (ref 65–140)
GLUCOSE SERPL-MCNC: 82 MG/DL (ref 65–140)
GLUCOSE SERPL-MCNC: 84 MG/DL (ref 65–140)
GLUCOSE SERPL-MCNC: 86 MG/DL (ref 65–140)
GLUCOSE SERPL-MCNC: 89 MG/DL (ref 65–140)
GLUCOSE SERPL-MCNC: 92 MG/DL (ref 65–140)
HCO3 BLDV-SCNC: 22.3 MMOL/L (ref 24–30)
HCT VFR BLD AUTO: 34.7 % (ref 36.5–49.3)
HCT VFR BLD AUTO: 36 % (ref 36.5–49.3)
HGB BLD-MCNC: 10.9 G/DL (ref 12–17)
HGB BLD-MCNC: 11.3 G/DL (ref 12–17)
IMM GRANULOCYTES # BLD AUTO: 0.03 THOUSAND/UL (ref 0–0.2)
IMM GRANULOCYTES NFR BLD AUTO: 1 % (ref 0–2)
INR PPP: 1.35 (ref 0.85–1.19)
LYMPHOCYTES # BLD AUTO: 0.38 THOUSANDS/ÂΜL (ref 0.6–4.47)
LYMPHOCYTES NFR BLD AUTO: 8 % (ref 14–44)
MAGNESIUM SERPL-MCNC: 2 MG/DL (ref 1.9–2.7)
MAGNESIUM SERPL-MCNC: 2 MG/DL (ref 1.9–2.7)
MCH RBC QN AUTO: 31.1 PG (ref 26.8–34.3)
MCH RBC QN AUTO: 31.3 PG (ref 26.8–34.3)
MCHC RBC AUTO-ENTMCNC: 31.4 G/DL (ref 31.4–37.4)
MCHC RBC AUTO-ENTMCNC: 31.4 G/DL (ref 31.4–37.4)
MCV RBC AUTO: 100 FL (ref 82–98)
MCV RBC AUTO: 99 FL (ref 82–98)
MONOCYTES # BLD AUTO: 0.27 THOUSAND/ÂΜL (ref 0.17–1.22)
MONOCYTES NFR BLD AUTO: 6 % (ref 4–12)
MRSA NOSE QL CULT: NORMAL
NEUTROPHILS # BLD AUTO: 3.96 THOUSANDS/ÂΜL (ref 1.85–7.62)
NEUTS SEG NFR BLD AUTO: 85 % (ref 43–75)
NRBC BLD AUTO-RTO: 0 /100 WBCS
O2 CT BLDV-SCNC: 16 ML/DL
PCO2 BLDV: 38.1 MM HG (ref 42–50)
PH BLDV: 7.39 [PH] (ref 7.3–7.4)
PHOSPHATE SERPL-MCNC: 5.5 MG/DL (ref 2.3–4.1)
PHOSPHATE SERPL-MCNC: 6.3 MG/DL (ref 2.3–4.1)
PLATELET # BLD AUTO: 106 THOUSANDS/UL (ref 149–390)
PLATELET # BLD AUTO: 119 THOUSANDS/UL (ref 149–390)
PMV BLD AUTO: 11.1 FL (ref 8.9–12.7)
PMV BLD AUTO: 11.7 FL (ref 8.9–12.7)
PO2 BLDV: 68.5 MM HG (ref 35–45)
POTASSIUM SERPL-SCNC: 4.6 MMOL/L (ref 3.5–5.3)
POTASSIUM SERPL-SCNC: 5.2 MMOL/L (ref 3.5–5.3)
PROCALCITONIN SERPL-MCNC: 0.47 NG/ML
PROT SERPL-MCNC: 6.5 G/DL (ref 6.4–8.4)
PROTHROMBIN TIME: 17.5 SECONDS (ref 12.3–15)
RBC # BLD AUTO: 3.48 MILLION/UL (ref 3.88–5.62)
RBC # BLD AUTO: 3.63 MILLION/UL (ref 3.88–5.62)
SODIUM SERPL-SCNC: 133 MMOL/L (ref 135–147)
SODIUM SERPL-SCNC: 135 MMOL/L (ref 135–147)
UNIT DISPENSE STATUS: NORMAL
UNIT DISPENSE STATUS: NORMAL
UNIT PRODUCT CODE: NORMAL
UNIT PRODUCT CODE: NORMAL
UNIT PRODUCT VOLUME: 500 ML
UNIT PRODUCT VOLUME: 500 ML
UNIT RH: NORMAL
UNIT RH: NORMAL
VIT B12 SERPL-MCNC: 384 PG/ML (ref 180–914)
WBC # BLD AUTO: 4.65 THOUSAND/UL (ref 4.31–10.16)
WBC # BLD AUTO: 6.79 THOUSAND/UL (ref 4.31–10.16)

## 2025-04-18 PROCEDURE — 84145 PROCALCITONIN (PCT): CPT

## 2025-04-18 PROCEDURE — 85025 COMPLETE CBC W/AUTO DIFF WBC: CPT

## 2025-04-18 PROCEDURE — 83735 ASSAY OF MAGNESIUM: CPT | Performed by: PHYSICIAN ASSISTANT

## 2025-04-18 PROCEDURE — 84100 ASSAY OF PHOSPHORUS: CPT

## 2025-04-18 PROCEDURE — 71045 X-RAY EXAM CHEST 1 VIEW: CPT

## 2025-04-18 PROCEDURE — 92610 EVALUATE SWALLOWING FUNCTION: CPT

## 2025-04-18 PROCEDURE — 82746 ASSAY OF FOLIC ACID SERUM: CPT

## 2025-04-18 PROCEDURE — 82330 ASSAY OF CALCIUM: CPT | Performed by: PHYSICIAN ASSISTANT

## 2025-04-18 PROCEDURE — 82948 REAGENT STRIP/BLOOD GLUCOSE: CPT

## 2025-04-18 PROCEDURE — 83735 ASSAY OF MAGNESIUM: CPT

## 2025-04-18 PROCEDURE — 84100 ASSAY OF PHOSPHORUS: CPT | Performed by: PHYSICIAN ASSISTANT

## 2025-04-18 PROCEDURE — 99232 SBSQ HOSP IP/OBS MODERATE 35: CPT | Performed by: SURGERY

## 2025-04-18 PROCEDURE — 85027 COMPLETE CBC AUTOMATED: CPT | Performed by: PHYSICIAN ASSISTANT

## 2025-04-18 PROCEDURE — 82607 VITAMIN B-12: CPT

## 2025-04-18 PROCEDURE — 5A1D70Z PERFORMANCE OF URINARY FILTRATION, INTERMITTENT, LESS THAN 6 HOURS PER DAY: ICD-10-PCS | Performed by: INTERNAL MEDICINE

## 2025-04-18 PROCEDURE — 99222 1ST HOSP IP/OBS MODERATE 55: CPT | Performed by: INTERNAL MEDICINE

## 2025-04-18 PROCEDURE — 80048 BASIC METABOLIC PNL TOTAL CA: CPT | Performed by: PHYSICIAN ASSISTANT

## 2025-04-18 PROCEDURE — 85610 PROTHROMBIN TIME: CPT

## 2025-04-18 PROCEDURE — 80053 COMPREHEN METABOLIC PANEL: CPT

## 2025-04-18 PROCEDURE — 99232 SBSQ HOSP IP/OBS MODERATE 35: CPT | Performed by: STUDENT IN AN ORGANIZED HEALTH CARE EDUCATION/TRAINING PROGRAM

## 2025-04-18 PROCEDURE — 82805 BLOOD GASES W/O2 SATURATION: CPT | Performed by: PHYSICIAN ASSISTANT

## 2025-04-18 PROCEDURE — 97167 OT EVAL HIGH COMPLEX 60 MIN: CPT

## 2025-04-18 PROCEDURE — 99232 SBSQ HOSP IP/OBS MODERATE 35: CPT | Performed by: INTERNAL MEDICINE

## 2025-04-18 RX ORDER — OXYCODONE HYDROCHLORIDE 5 MG/1
5 TABLET ORAL EVERY 6 HOURS PRN
Refills: 0 | Status: DISCONTINUED | OUTPATIENT
Start: 2025-04-18 | End: 2025-04-18

## 2025-04-18 RX ORDER — INSULIN LISPRO 100 [IU]/ML
1-6 INJECTION, SOLUTION INTRAVENOUS; SUBCUTANEOUS
Status: DISCONTINUED | OUTPATIENT
Start: 2025-04-18 | End: 2025-04-24 | Stop reason: HOSPADM

## 2025-04-18 RX ORDER — LIDOCAINE 50 MG/G
1 PATCH TOPICAL DAILY
Status: DISCONTINUED | OUTPATIENT
Start: 2025-04-18 | End: 2025-04-24 | Stop reason: HOSPADM

## 2025-04-18 RX ORDER — ASPIRIN 81 MG/1
81 TABLET, CHEWABLE ORAL ONCE
Status: COMPLETED | OUTPATIENT
Start: 2025-04-18 | End: 2025-04-18

## 2025-04-18 RX ORDER — METOPROLOL SUCCINATE 25 MG/1
25 TABLET, EXTENDED RELEASE ORAL 2 TIMES DAILY
Status: DISCONTINUED | OUTPATIENT
Start: 2025-04-18 | End: 2025-04-20

## 2025-04-18 RX ORDER — ALBUMIN HUMAN 50 G/1000ML
12.5 SOLUTION INTRAVENOUS ONCE
Status: COMPLETED | OUTPATIENT
Start: 2025-04-18 | End: 2025-04-18

## 2025-04-18 RX ORDER — GUAIFENESIN 100 MG/5ML
200 SOLUTION ORAL EVERY 4 HOURS PRN
Status: DISCONTINUED | OUTPATIENT
Start: 2025-04-18 | End: 2025-04-24 | Stop reason: HOSPADM

## 2025-04-18 RX ORDER — ONDANSETRON 2 MG/ML
4 INJECTION INTRAMUSCULAR; INTRAVENOUS ONCE
Status: COMPLETED | OUTPATIENT
Start: 2025-04-18 | End: 2025-04-18

## 2025-04-18 RX ORDER — PRASUGREL 10 MG/1
10 TABLET, FILM COATED ORAL DAILY
Status: DISCONTINUED | OUTPATIENT
Start: 2025-04-18 | End: 2025-04-24 | Stop reason: HOSPADM

## 2025-04-18 RX ORDER — ACETAMINOPHEN 325 MG/1
325 TABLET ORAL EVERY 6 HOURS PRN
Status: DISCONTINUED | OUTPATIENT
Start: 2025-04-18 | End: 2025-04-18

## 2025-04-18 RX ORDER — MIDODRINE HYDROCHLORIDE 5 MG/1
10 TABLET ORAL
Status: DISCONTINUED | OUTPATIENT
Start: 2025-04-18 | End: 2025-04-24 | Stop reason: HOSPADM

## 2025-04-18 RX ORDER — ATORVASTATIN CALCIUM 40 MG/1
40 TABLET, FILM COATED ORAL
Status: DISCONTINUED | OUTPATIENT
Start: 2025-04-18 | End: 2025-04-24 | Stop reason: HOSPADM

## 2025-04-18 RX ADMIN — APIXABAN 2.5 MG: 2.5 TABLET, FILM COATED ORAL at 17:28

## 2025-04-18 RX ADMIN — ASPIRIN 81 MG: 81 TABLET, CHEWABLE ORAL at 10:00

## 2025-04-18 RX ADMIN — ONDANSETRON 4 MG: 2 INJECTION INTRAMUSCULAR; INTRAVENOUS at 01:06

## 2025-04-18 RX ADMIN — GUAIFENESIN 200 MG: 200 SOLUTION ORAL at 17:32

## 2025-04-18 RX ADMIN — MIDODRINE HYDROCHLORIDE 10 MG: 5 TABLET ORAL at 13:04

## 2025-04-18 RX ADMIN — PIPERACILLIN AND TAZOBACTAM 4.5 G: 36; 4.5 INJECTION, POWDER, LYOPHILIZED, FOR SOLUTION INTRAVENOUS at 09:15

## 2025-04-18 RX ADMIN — ALBUMIN (HUMAN) 12.5 G: 12.5 INJECTION, SOLUTION INTRAVENOUS at 20:58

## 2025-04-18 RX ADMIN — Medication 500 MG: at 01:43

## 2025-04-18 RX ADMIN — APIXABAN 2.5 MG: 2.5 TABLET, FILM COATED ORAL at 10:00

## 2025-04-18 RX ADMIN — LIDOCAINE 5% 1 PATCH: 700 PATCH TOPICAL at 01:12

## 2025-04-18 RX ADMIN — CHLORHEXIDINE GLUCONATE 15 ML: 1.2 SOLUTION ORAL at 20:58

## 2025-04-18 RX ADMIN — LIDOCAINE 5% 1 PATCH: 700 PATCH TOPICAL at 09:00

## 2025-04-18 RX ADMIN — CHLORHEXIDINE GLUCONATE 15 ML: 1.2 SOLUTION ORAL at 09:00

## 2025-04-18 RX ADMIN — ATORVASTATIN CALCIUM 40 MG: 40 TABLET, FILM COATED ORAL at 17:28

## 2025-04-18 NOTE — SPEECH THERAPY NOTE
Speech-Language Pathology Bedside Swallow Evaluation        Patient Name: Asad Galloway    Today's Date: 4/18/2025     Problem List  Principal Problem:    Fall  Active Problems:    Mixed hyperlipidemia    Anemia    ESRD on dialysis (HCC)    CAD, multiple vessel    Acute hypoxic respiratory failure (HCC)    Ischemic cardiomyopathy    Aortic stenosis    Acute embolism and thrombosis of right peroneal vein (HCC)    Type 2 diabetes mellitus (HCC)    Chronic kidney disease-mineral and bone disorder    HFrEF (heart failure with reduced ejection fraction) (HCC)    Cirrhosis (HCC)       Summary    Pt presents with mild-mod oropharyngeal dysphagia. Recent hx of intubation/extubation during current admission (1 day). Rec'd dysphagia-1/thins, however pt refused to trial puree during evaluation - but no s/s aspiration w/ thins and pt on regular diet at baseline. Discuss w/ medical team. If pt exhibits s/s w/ puree, please discontinue and discuss w/ ST. Nursing staff noted that he was tolerating sips of thins w/o difficulty. Coughing and difficulty during mastication w/ bread exhibited - family plans to bring U/L dentures for ST follow up. Rec meds crushed in puree. Initiate aspiration precautions.      Recommendations:   Diet: puree/dysphagia level-1 and thin liquids (If pt presenting w/ s/s aspiration - please discontinue & contact ST)  Meds: crushed in puree  Frequent Oral care: 2x/day  Aspiration precautions and compensatory swallowing strategies: upright posture, small bites/sips.  Other Recommendations/ considerations: ST will follow up       Current Medical Status  Pt is a 65 y.o. male who presented to [facility]  with evaluation after suffering a fall. During initial evaluation he was intermittently hypoxic though this improved with repositioning. Acute hypoxic respiratory failure (HCC) - Patient became acutely hypoxic during initial evaluation requiring intubation    Past medical history:   Please see H&P for  details    Special Studies:  CXR 4/17/25: Query mild pulmonary edema.    Chest CT 4/17/25: Bilateral linear interstitial opacities in the upper lobes suggesting areas of atelectasis. Mild interstitial thickening reflecting early interstitial edema. Dependent atelectasis in the lung bases.    Head CT 4/17/25: No acute intracranial abnormality.    Social/Education/Vocational Hx:  Pt lives with family at home    Swallow Information   Prior speech/swallowing tx: none recently (last seen 4/19/22 - found no swallowing deficits)  Current Risks for Dysphagia & Aspiration: recent intubation  Current Symptoms/Concerns: coughing with po - mild cough w/ bread and butter, unable to swallow it , spit it out   Current Diet: NPO ; sips w/ meds  Baseline Diet: regular diet and thin liquids  Takes pills- crushed w/ ice cream    Baseline Assessment   Behavior/Cognition: alert - able to answer some orientation questions (could not name hospital/current year)  Speech/Language Status: able to participate in conversation and able to follow commands  Patient Positioning: upright in bed     Swallow Mechanism Exam   Facial: left facial droop   Labial: WFL  Lingual: mild protrusion towards left  Velum: unable to visualize  Mandible: adequate ROM  Dentition: edentulous and dentures are at home  Vocal quality:hoarse and wet    Volitional Cough: strong/productive   Respiratory: NC    Consistencies Assessed and Performance   Consistencies Administered: thin liquids, nectar thick, honey thick, and soft solids (HTL by cup, NTL by cup/straw, thins by cup/straw, soft solid - bread w/ butter)    Oral Stage:     Pt had adequate bilabial seal w/ all consistencies and modalities. Bolus control/manipulation was WNL for all liquid consistencies trialed. Attempted to trial puree (pudding/applesauce) - pt refused. Attempted soft solid (bread) - pt exhibited poor mastication, likely due to edentulous oral cavity, spit out bread in ST hand. Mild oral hold w/  all consistencies exhibited. AP transfer was timely w/ all consistencies and modalities.     Pharyngeal Stage:     Unable to produce volitional swallow prior to trials, but strong hyolaryngeal excursion noted upon palpation w/ trials. Pt exhibited a mild swallow initiation delay w/ all liquids trialed by cup/straw. No coughing/TC presented w/ any liquid trials by cup/straw.     Esophageal Concerns: none reported      Results Reviewed with: patient, RN, and family   Dysphagia Goals: pt will participate in repeat swallow eval to determine safety of po intake and/or further instrumental testing.  Discharge recommendation:  ST will follow up for assessment of solids when dentures are brought by family    Speech Therapy Prognosis   Prognosis: good    Prognosis Considerations: age and therapeutic potential

## 2025-04-18 NOTE — CONSULTS
Consultation - Cardiology Team 1  Asad Galloway 65 y.o. male MRN: [id number]  Unit/Bed#: ICU 11 Encounter: [id number]  Assessment & Plan  Fall  Fell while getting out of car. Denied LOC. Wife reports weakness since the fall.  Blood pressure by EMS 60's. Typically walks short distances independently with his walker.  Increasing in lightheadedness and fatigue.  No chest pain or pressure.  Has exertional dyspnea.  ESRD on dialysis ([facility])  On HD per nephrology.  Has been on dialysis about 5 years.  For dialysis today with midodrine.  Received volume expansion yesterday in the setting of hypotension does not appear grossly volume overloaded but does have interstitial edema on CT of the chest.  Renal planning for 1 L ultrafiltration.  CAD, multiple vessel  History of multivessel PCI.  Most recent 3/17/2025 at [place]-left main normal, proximal LAD 90%, circumflex luminal irregularities, first obtuse marginal 100%, right system not described, successful balloon angioplasty to proximal LAD with 0% residual stenosis. ( 2/27/2025-RPDA 90% with first %)  ADE to first marginal 5/2022, ADE to proximal-mid LAD and circumflex [facility], 6/27/2022 70% mid LAD treated with thrombectomy (lesion felt to be thrombosed, [facility]), 8/5/2022 balloon angioplasty to in-stent thrombosis of LAD, 2/27/2025 severe in-stent restenosis of LAD and  to OM1 ([facility])   0-hour troponin 130, 2-hour troponin 117, troponin 107. No ECG available , will check for completeness  On aspirin/effient,  statin.  BB held due to hypotension  Acute hypoxic respiratory failure (HCC)  Shortly after arrival became hypoxic lying flat for CT scan requiring intubation. ? Related to timing of transfusion  CT of the chest no PE.  Mild interstitial edema seen on CT  Since extubated. On 2 liters.   Ischemic cardiomyopathy  Ischemic cardiomyopathy with partial recovery of EF.  EF has ranged 25 to 40% over several years  TTE 3/1/2025 LVEF 35 to 40% with  global hypokinesis with regional variations.  Moderate RV systolic reduction.  Severe left atrial dilatation.  Moderate to severe calcific aortic valve with moderate to severe aortic stenosis.  Current TTE LVEF 20%. Visually closer to 30%.   Beta-blocker and Entresto held for hypotension. Resume as BP tolerates   Recommend resuming life vest on discharge  Aortic stenosis  Moderate severe calcific aortic stenosis with LV dysfunction.  Current echo LVEF 20% with RV dysfunction.  Moderate AI.  Moderate aortic stenosis.  Mean gradient 17 mmHg.  Dimensionless velocity index 0.32.  Additionally there is moderate MR.  RVSP 67 mmHg.  Undergoing TAVR workup at [place]. Scheduled in May. Will likely need rehab post hospitalization prior to TAVR.   Shock (HCC)  On Levophed briefly for hypotension  Viral panel blood cultures negative.  Initial procalcitonin negative  Small subcutaneous hematoma right hip without evidence of active bleeding.  No acute traumatic injury of the abdomen pelvis otherwise noted. 2 U  PRBC given initially due to concern for possible bleed  Acute embolism and thrombosis of right peroneal vein ([facility])  On Eliquis 2.5 mg twice daily.  History of right upper extremity DVT  Anemia  Concerns noted for possible hemorrhagic shock.  Given 2 units of PRBCs.  H&H stable.  No signs of active bleeding.  Type 2 diabetes mellitus ([facility])  Lab Results   Component Value Date    HGBA1C 5.4 04/10/2025     HFrEF (heart failure with reduced ejection fraction) ([facility])  Current TTE LVEF 20% with severe global hypokinesis.  Severely abnormal diastolic function.  RV severely dilated with mildly reduced function moderate AI.  Moderate aortic stenosis.  BNP greater than 4700 (2769 when hospitalized with acute encephalopathy/SIRS)  As above  Volume management with HD    Cirrhosis (HCC)  As noted on CT abdomen.  Findings concerning for cirrhosis with ascites      History of Present Illness   Physician Requesting Consult: Franci Reis  MD  Reason for Consult / Principal Problem: CMP    HPI: Asad Galloway is a 65 y.o. year old male with ischemic cardiomyopathy, ESRD on HD, with multivessel coronary interventions since 2022 with most recent at [facility] 3/17/2025-balloon angioplasty to proximal LAD with 0% residual stenosis,  hypertension , anemia , AS awaiting TAVR , PVD , history of DVT who presents with a fall in the parking lot.  Per EMS blood pressure in the 60s.  No traumatic injury on imaging.  During evaluation he became acutely hypoxic requiring intubation.  Chest x-ray consistent with heart failure exacerbation.  CTA of the chest PE study no PE.  Cardiomegaly with small right pleural effusion and interstitial edema.  Cirrhosis with ascites.  Had increasing fatigue lightheadedness weakness.  No chest pain.  Chronic CÁRDENAS.    On arrival to hospital BP 98/54 with a pulse ox of 100% on room air.  He became more hypotensive and hypoxic about an hour into admission.     He was hospitalized a few weeks ago with chest pain with EKG changes-inferolateral ST depression with only mild elevated troponin.  Unable to uptitrate antianginals due to borderline low blood pressures.  Ranexa suggested but family declined.  Wanted to speak with his primary cardiologist .     Follows with Dr. Tan and cardiology at [facility]. He is scheduled for TAVR in May at [place].     Inpatient consult to Cardiology  Consult performed by: BRITTANY Burch  Consult ordered by: Almita Rodriguez MD        Review of Systems   Constitutional:  Positive for activity change and fatigue.   HENT: Negative.     Eyes: Negative.    Respiratory:  Positive for shortness of breath.    Cardiovascular:  Negative for chest pain, palpitations and leg swelling.   Gastrointestinal: Negative.    Endocrine: Negative.    Musculoskeletal:  Positive for gait problem.   Neurological:  Positive for weakness and light-headedness.   Hematological:  Bruises/bleeds easily.    Psychiatric/Behavioral:  Positive for confusion.    All other systems reviewed and are negative.      Historical Information   Past Medical History:   Diagnosis Date    Cerebrovascular accident (CVA) due to thrombosis of left middle cerebral artery (HCC) 07/29/2018    Chronic kidney disease     Coronary artery disease     Diabetes mellitus (HCC)     not on meds    Dialysis patient (HCC)     M-W-F    Fistula     left upper arm for hemodialyis    GERD (gastroesophageal reflux disease)     History of coronary artery stent placement     x3    Hypercholesteremia     Hyperlipidemia     Hypertension     Infectious viral hepatitis     B as child    Limb alert care status     no BP/IV left arm    Neuropathy     Nonhealing ulcer of heel (HCC) 04/25/2023    Obesity     Osteomyelitis (HCC)     last assessed 11/4/16-per son not currently    Penetrating foot wound, left, initial encounter 01/19/2022    PVC's (premature ventricular contractions)     sees  Cardio    Stroke ([facility])     last weeof July 2018 [facility]    TIA (transient ischemic attack) 10/28/2018    Ulcer of left heel, limited to breakdown of skin ([facility]) 06/13/2024    Wears dentures     Wears glasses      Past Surgical History:   Procedure Laterality Date    ABDOMINAL SURGERY      CARDIAC CATHETERIZATION N/A 05/02/2022    Procedure: Cardiac Coronary Angiogram;  Surgeon: Sam Davis MD;  Location: [facility] CARDIAC CATH LAB;  Service: Cardiology    CARDIAC CATHETERIZATION N/A 05/02/2022    Procedure: Cardiac pci;  Surgeon: Sam Davis MD;  Location: [facility] CARDIAC CATH LAB;  Service: Cardiology    CARDIAC CATHETERIZATION  02/01/2023    Procedure: Cardiac catheterization;  Surgeon: Sam Davis MD;  Location: [facility] CARDIAC CATH LAB;  Service: Cardiology    CARDIAC CATHETERIZATION N/A 02/01/2023    Procedure: Cardiac pci;  Surgeon: Sam Davis MD;  Location: [facility] CARDIAC CATH LAB;  Service: Cardiology    CARDIAC CATHETERIZATION N/A 02/01/2023    Procedure: Cardiac  "Coronary Angiogram;  Surgeon: Sam Davis MD;  Location: [facility] CARDIAC CATH LAB;  Service: Cardiology    CARDIAC CATHETERIZATION N/A 02/01/2023    Procedure: Cardiac other-IVUS;  Surgeon: Sam Davis MD;  Location: [facility] CARDIAC CATH LAB;  Service: Cardiology    CHOLECYSTECTOMY      Percutaneous    COLONOSCOPY      CORONARY ANGIOPLASTY WITH STENT PLACEMENT      CYSTOSCOPY      HEMODIALYSIS ADULT  1/22/2024    HEMODIALYSIS ADULT  01/24/2024    IR LOWER EXTREMITY ANGIOGRAM  9/29/2023    IR LOWER EXTREMITY ANGIOGRAM  02/26/2024    OTHER SURGICAL HISTORY      \"stimulator to control bowel movements\"    LA ESOPHAGOGASTRODUODENOSCOPY TRANSORAL DIAGNOSTIC N/A 09/27/2016    Procedure: ESOPHAGOGASTRODUODENOSCOPY (EGD);  Surgeon: Adele Rowe MD;  Location: [facility] GI LAB;  Service: Gastroenterology    LA LAPAROSCOPY SURG CHOLECYSTECTOMY N/A 02/29/2016    Procedure: LAPAROSCOPIC CHOLECYSTECTOMY ;  Surgeon: Cliff Roman DO;  Location: [facility] Main OR;  Service: General    LA SLCTV CATHJ 3RD+ ORD SLCTV ABDL PEL/LXTR BRNCH Left 9/29/2023    Procedure: Left leg angiogram with intervention;  Surgeon: Michelle Galvan MD;  Location: [facility] MAIN OR;  Service: Vascular    LA SLCTV CATHJ 3RD+ ORD SLCTV ABDL PEL/LXTR BRNCH Left 02/26/2024    Procedure: Left leg angiogram antegrade access, left popliteal angioplasty;  Surgeon: Michelle Galvan MD;  Location: [facility] MAIN OR;  Service: Vascular    ROTATOR CUFF REPAIR Right     SPINAL CORD STIMULATOR IMPLANT      \"Medtronic interstim model # 3058- in lower back to control bowel movements-currently turned off-battery is dead\"    TOE AMPUTATION Right 10/28/2016    Procedure: 3RD TOE AMPUTATION ;  Surgeon: Anjel Salas DPM;  Location: [facility] Main OR;  Service:      Social History     Substance and Sexual Activity   Alcohol Use Never     Social History     Substance and Sexual Activity   Drug Use No     Social History     Tobacco Use   Smoking Status Never   Smokeless Tobacco Never     Family " History:   Family History   Problem Relation Age of Onset    Leukemia Mother     Liver disease Mother     Lung cancer Mother         heavy smoker - 3 ppd    Heart disease Father     Liver disease Father     Diabetes type I Father     Multiple myeloma Sister     Breast cancer Sister     Urolithiasis Family     Alcohol abuse Neg Hx     Depression Neg Hx     Drug abuse Neg Hx     Substance Abuse Neg Hx     Mental illness Neg Hx        Meds/Allergies   current meds:   Current Facility-Administered Medications:     acetaminophen (Ofirmev) IVPB 500 mg, Q8H PRN, Last Rate: 500 mg (04/18/25 0143)    apixaban (ELIQUIS) tablet 2.5 mg, BID    aspirin chewable tablet 81 mg, Once    atorvastatin (LIPITOR) tablet 40 mg, Daily With Dinner    chlorhexidine (PERIDEX) 0.12 % oral rinse 15 mL, Q12H MICH    [Held by provider] Cholecalciferol (VITAMIN D3) tablet 1,000 Units, Daily    [Held by provider] cinacalcet (SENSIPAR) tablet 30 mg, Daily With Breakfast    levalbuterol (XOPENEX) inhalation solution 0.63 mg, Q6H PRN    lidocaine (LIDODERM) 5 % patch 1 patch, Daily    [Held by provider] melatonin tablet 3 mg, HS    [Held by provider] metoprolol succinate (TOPROL-XL) 24 hr tablet 25 mg, BID    midodrine (PROAMATINE) tablet 10 mg, Before Dialysis    prasugrel (EFFIENT) tablet 10 mg, Daily    [Held by provider] sacubitril-valsartan (ENTRESTO) 24-26 MG per tablet 1 tablet, BID and PTA meds:    Medications Prior to Admission:     apixaban (Eliquis) 2.5 mg    aspirin 81 mg chewable tablet    atorvastatin (LIPITOR) 40 mg tablet    cinacalcet (SENSIPAR) 30 mg tablet    doxycycline hyclate (VIBRAMYCIN) 100 mg capsule    melatonin 3 mg    metoprolol succinate (TOPROL-XL) 25 mg 24 hr tablet    midodrine (PROAMATINE) 10 MG tablet    midodrine (PROAMATINE) 2.5 mg tablet    MIDODRINE HCL PO    mupirocin (BACTROBAN) 2 % ointment    prasugrel (EFFIENT) tablet    sacubitril-valsartan (Entresto) 24-26 MG TABS    Sevelamer Carbonate 2.4 g    Vitamin D3  "1.25 MG (06130 UT) capsule  Allergies   Allergen Reactions    Penicillin G Hives     Other Reaction(s): Unknown/HD has no allergy to PCN    Shellfish-Derived Products - Food Allergy Rash     Other reaction(s): Unknown      Other Reaction(s): denies any allergy       Objective   Vitals: Blood pressure 97/54, pulse 93, temperature 97.6 °F (36.4 °C), temperature source Axillary, resp. rate (!) 38, height 5' 9\" (1.753 m), weight 91.3 kg (201 lb 4.5 oz), SpO2 93%.  Orthostatic Blood Pressures      Flowsheet Row Most Recent Value   Blood Pressure 97/54 filed at 04/18/2025 0515   Patient Position - Orthostatic VS Lying filed at 04/17/2025 1900              Intake/Output Summary (Last 24 hours) at 4/18/2025 1036  Last data filed at 4/17/2025 1712  Gross per 24 hour   Intake 760.98 ml   Output --   Net 760.98 ml       Invasive Devices       Peripheral Intravenous Line  Duration             Peripheral IV 04/17/25 Right Antecubital 1 day              Arterial Line  Duration             Arterial Line 04/17/25 Right Radial <1 day              Line  Duration             Hemodialysis Access Left Upper arm -- days              Drain  Duration             NG/OG/Enteral Tube Orogastric Center mouth <1 day    Urethral Catheter Temperature probe <1 day                    Physical Exam: BP 97/54   Pulse 93   Temp 97.6 °F (36.4 °C) (Axillary)   Resp (!) 38   Ht 5' 9\" (1.753 m)   Wt 91.3 kg (201 lb 4.5 oz)   SpO2 93%   BMI 29.72 kg/m²   General Appearance:    Alert, cooperative, no distress, appears than stated age and chronically ill   Head:    Normocephalic, no scleral icterus   Eyes:    PERRL   Nose:   Nares normal, septum midline, mucosa normal, no drainage    Throat:   Lips, mucosa, and tongue normal   Neck:   Supple, symmetrical, trachea midline     no carotid bruit or JVD       Lungs:     Clear to auscultation bilaterally, respirations unlabored        Heart:    Regular rate and rhythm, S1 and S2 normal       Extremities:   " Extremities normal, atraumatic, no cyanosis or edema       Skin:   Skin warm   Neurologic:   Alert and seems confused.        Lab Results:   Recent Results (from the past 72 hours)   CBC and differential    Collection Time: 04/17/25 10:20 AM   Result Value Ref Range    WBC 6.09 4.31 - 10.16 Thousand/uL    RBC 3.12 (L) 3.88 - 5.62 Million/uL    Hemoglobin 10.0 (L) 12.0 - 17.0 g/dL    Hematocrit 33.1 (L) 36.5 - 49.3 %     (H) 82 - 98 fL    MCH 32.1 26.8 - 34.3 pg    MCHC 30.2 (L) 31.4 - 37.4 g/dL    RDW 17.6 (H) 11.6 - 15.1 %    MPV 12.5 8.9 - 12.7 fL    Platelets 105 (L) 149 - 390 Thousands/uL    nRBC 0 /100 WBCs    Segmented % 75 43 - 75 %    Immature Grans % 1 0 - 2 %    Lymphocytes % 9 (L) 14 - 44 %    Monocytes % 12 4 - 12 %    Eosinophils Relative 2 0 - 6 %    Basophils Relative 1 0 - 1 %    Absolute Neutrophils 4.60 1.85 - 7.62 Thousands/µL    Absolute Immature Grans 0.05 0.00 - 0.20 Thousand/uL    Absolute Lymphocytes 0.56 (L) 0.60 - 4.47 Thousands/µL    Absolute Monocytes 0.74 0.17 - 1.22 Thousand/µL    Eosinophils Absolute 0.11 0.00 - 0.61 Thousand/µL    Basophils Absolute 0.03 0.00 - 0.10 Thousands/µL   Type and screen    Collection Time: 04/17/25  1:47 PM   Result Value Ref Range    ABO Grouping O     Rh Factor Positive     Antibody Screen Negative     Specimen Expiration Date [id number]    CBC and differential    Collection Time: 04/17/25  1:47 PM   Result Value Ref Range    WBC 5.07 4.31 - 10.16 Thousand/uL    RBC 3.01 (L) 3.88 - 5.62 Million/uL    Hemoglobin 9.5 (L) 12.0 - 17.0 g/dL    Hematocrit 31.3 (L) 36.5 - 49.3 %     (H) 82 - 98 fL    MCH 31.6 26.8 - 34.3 pg    MCHC 30.4 (L) 31.4 - 37.4 g/dL    RDW 17.7 (H) 11.6 - 15.1 %    MPV 11.9 8.9 - 12.7 fL    Platelets 108 (L) 149 - 390 Thousands/uL    nRBC 0 /100 WBCs    Segmented % 73 43 - 75 %    Immature Grans % 1 0 - 2 %    Lymphocytes % 12 (L) 14 - 44 %    Monocytes % 12 4 - 12 %    Eosinophils Relative 2 0 - 6 %    Basophils Relative 0 0  "- 1 %    Absolute Neutrophils 3.65 1.85 - 7.62 Thousands/µL    Absolute Immature Grans 0.03 0.00 - 0.20 Thousand/uL    Absolute Lymphocytes 0.63 0.60 - 4.47 Thousands/µL    Absolute Monocytes 0.63 0.17 - 1.22 Thousand/µL    Eosinophils Absolute 0.11 0.00 - 0.61 Thousand/µL    Basophils Absolute 0.02 0.00 - 0.10 Thousands/µL   Comprehensive metabolic panel    Collection Time: 04/17/25  1:47 PM   Result Value Ref Range    Sodium 138 135 - 147 mmol/L    Potassium 4.3 3.5 - 5.3 mmol/L    Chloride 96 96 - 108 mmol/L    CO2 32 21 - 32 mmol/L    ANION GAP 10 4 - 13 mmol/L    BUN 25 5 - 25 mg/dL    Creatinine 4.62 (H) 0.60 - 1.30 mg/dL    Glucose 89 65 - 140 mg/dL    Calcium 8.0 (L) 8.4 - 10.2 mg/dL    AST 13 13 - 39 U/L    ALT 9 7 - 52 U/L    Alkaline Phosphatase 126 (H) 34 - 104 U/L    Total Protein 6.7 6.4 - 8.4 g/dL    Albumin 3.8 3.5 - 5.0 g/dL    Total Bilirubin 0.88 0.20 - 1.00 mg/dL    eGFR 12 ml/min/1.73sq m   Protime-INR    Collection Time: 04/17/25  1:47 PM   Result Value Ref Range    Protime 18.8 (H) 12.3 - 15.0 seconds    INR 1.50 (H) 0.85 - 1.19   APTT    Collection Time: 04/17/25  1:47 PM   Result Value Ref Range    PTT 38 (H) 23 - 34 seconds   B-Type Natriuretic Peptide(BNP)    Collection Time: 04/17/25  1:47 PM   Result Value Ref Range    BNP >4,700 (H) 0 - 100 pg/mL   HS Troponin 0hr (reflex protocol)    Collection Time: 04/17/25  1:47 PM   Result Value Ref Range    hs TnI 0hr 130 (H) \"Refer to ACS Flowchart\"- see link ng/L   Lactic acid, plasma (w/reflex if result > 2.0)    Collection Time: 04/17/25  1:47 PM   Result Value Ref Range    LACTIC ACID 1.4 0.5 - 2.0 mmol/L   Blood gas, venous    Collection Time: 04/17/25  1:47 PM   Result Value Ref Range    pH, Sam 7.397 7.300 - 7.400    pCO2, Sam 46.1 42.0 - 50.0 mm Hg    pO2, Sam 30.8 (L) 35.0 - 45.0 mm Hg    HCO3, Sam 27.7 24 - 30 mmol/L    Base Excess, Sam 2.4 mmol/L    O2 Content, Sam 7.9 ml/dL    O2 HGB, VENOUS 52.7 (L) 60.0 - 80.0 %   POCT Blood Gas " (CG8+)    Collection Time: 04/17/25  1:48 PM   Result Value Ref Range    ph, Sam ISTAT 7.396 7.300 - 7.400    pCO2, Sam i-STAT 50.7 (H) 42.0 - 50.0 mm HG    pO2, Sam i-STAT 21.0 (L) 35.0 - 45.0 mm HG    BE, i-STAT 5 (H) -2 - 3 mmol/L    HCO3, Sam i-STAT 31.1 (H) 24.0 - 30.0 mmol/L    CO2, i-STAT 33 (H) 21 - 32 mmol/L    O2 Sat, i-STAT 34 (L) 60 - 85 %    SODIUM, I-STAT 137 136 - 145 mmol/l    Potassium, i-STAT 4.3 3.5 - 5.3 mmol/L    Calcium, Ionized i-STAT 0.99 (L) 1.12 - 1.32 mmol/L    Hct, i-STAT 31 (L) 36.5 - 49.3 %    Hgb, i-STAT 10.5 (L) 12.0 - 17.0 g/dl    Glucose, i-STAT 89 65 - 140 mg/dl    Specimen Type VENOUS    Prepare Leukoreduced Whole Blood    Collection Time: 04/17/25  2:03 PM   Result Value Ref Range    Unit Product Code P4317L25     Unit Number [id number]     Unit ABO O     Unit RH POS     Unit Dispense Status Presumed Trans     Unit Product Volume 500 mL   Prepare Leukoreduced Whole Blood    Collection Time: 04/17/25  2:07 PM   Result Value Ref Range    Unit Product Code J7586I54     Unit Number [id number]     Unit ABO O     Unit RH POS     Unit Dispense Status Presumed Trans     Unit Product Volume 500 mL   Prepare Leukoreduced RBC    Collection Time: 04/17/25  2:15 PM   Result Value Ref Range    Unit Product Code W1740N34     Unit Number [id number]     Unit ABO O     Unit RH POS     Unit Dispense Status Return to Inv     Unit Product Volume 300 ml    Unit Product Code U8339A30     Unit Number [id number]     Unit ABO O     Unit RH POS     Unit Dispense Status Return to Inv     Unit Product Volume 350 ml    Unit Product Code X4606W39     Unit Number [id number]     Unit ABO O     Unit RH POS     Unit Dispense Status Return to Inv     Unit Product Volume 350 ml    Unit Product Code X5548F03     Unit Number [id number]     Unit ABO O     Unit RH POS     Unit Dispense Status Return to Inv     Unit Product Volume 350 ml    Unit Product Code A5428J91     Unit Number  [id number]     Unit ABO O     Unit RH POS     Unit Dispense Status Return to Inv     Unit Product Volume 350 ml    Unit Product Code P4841P10     Unit Number [id number]     Unit ABO O     Unit RH POS     Unit Dispense Status Return to Inv     Unit Product Volume 350 ml   Prepare Plasma    Collection Time: 04/17/25  2:16 PM   Result Value Ref Range    Unit Product Code X9383N56     Unit Number [id number]     Unit ABO AB     Unit RH NEG     Unit Dispense Status Return to Inv     Unit Product Volume 280 mL    Unit Product Code H9649F11     Unit Number [id number]     Unit ABO AB     Unit RH POS     Unit Dispense Status Return to Inv     Unit Product Volume 250 ml    Unit Product Code T9106O36     Unit Number [id number]     Unit ABO A     Unit RH POS     Unit Dispense Status Return to Inv     Unit Product Volume 280 ml   Prepare Plasma-Adult    Collection Time: 04/17/25  2:18 PM   Result Value Ref Range    Unit Product Code S5155L90     Unit Number [id number]     Unit ABO A     Unit RH POS     Unit Dispense Status Return to Inv     Unit Product Volume 250 mL   POCT Blood Gas (CG8+)    Collection Time: 04/17/25  2:19 PM   Result Value Ref Range    ph, Sam ISTAT 7.508 (H) 7.300 - 7.400    pCO2, Sam i-STAT 38.1 (L) 42.0 - 50.0 mm HG    pO2, Sam i-STAT 26.0 (L) 35.0 - 45.0 mm HG    BE, i-STAT 7 (H) -2 - 3 mmol/L    HCO3, Sam i-STAT 30.2 (H) 24.0 - 30.0 mmol/L    CO2, i-STAT 31 21 - 32 mmol/L    O2 Sat, i-STAT 55 (L) 60 - 85 %    SODIUM, I-STAT 136 136 - 145 mmol/l    Potassium, i-STAT 4.6 3.5 - 5.3 mmol/L    Calcium, Ionized i-STAT 0.85 (L) 1.12 - 1.32 mmol/L    Hct, i-STAT 29 (L) 36.5 - 49.3 %    Hgb, i-STAT 9.9 (L) 12.0 - 17.0 g/dl    Glucose, i-STAT 79 65 - 140 mg/dl    Specimen Type VENOUS    Prepare Leukoreduced Platelet Pheresis (1 pheresis product = 6-8 pooled units)    Collection Time: 04/17/25  2:19 PM   Result Value Ref Range    Unit Product Code U5898O82     Unit Number  [id number]     Unit ABO A     Unit RH POS     Unit Dispense Status Return to Inv     Unit Product Volume 300 mL   Prepare Leukoreduced RBC    Collection Time: 04/17/25  2:22 PM   Result Value Ref Range    Unit Product Code T3841B83     Unit Number [id number]     Unit ABO O     Unit RH POS     Unit Dispense Status Return to Inv     Unit Product Volume 350 ml    Unit Product Code X5772C64     Unit Number [id number]     Unit ABO O     Unit RH POS     Unit Dispense Status Return to Inv     Unit Product Volume 350 ml    Unit Product Code R8254J94     Unit Number [id number]     Unit ABO O     Unit RH POS     Unit Dispense Status Return to Inv     Unit Product Volume 350 ml    Unit Product Code F4726H72     Unit Number [id number]     Unit ABO O     Unit RH POS     Unit Dispense Status Return to Inv     Unit Product Volume 350 ml   POCT Blood Gas (CG8+)    Collection Time: 04/17/25  2:40 PM   Result Value Ref Range    ph, Sam ISTAT 7.438 (H) 7.300 - 7.400    pCO2, Sam i-STAT 30.0 (L) 42.0 - 50.0 mm HG    pO2, Sam i-STAT 37.0 35.0 - 45.0 mm HG    BE, i-STAT -3 (L) -2 - 3 mmol/L    HCO3, Sam i-STAT 20.3 (L) 24.0 - 30.0 mmol/L    CO2, i-STAT 21 21 - 32 mmol/L    O2 Sat, i-STAT 74 60 - 85 %    SODIUM, I-STAT 140 136 - 145 mmol/l    Potassium, i-STAT 4.1 3.5 - 5.3 mmol/L    Calcium, Ionized i-STAT 0.65 (LL) 1.12 - 1.32 mmol/L    Hct, i-STAT 30 (L) 36.5 - 49.3 %    Hgb, i-STAT 10.2 (L) 12.0 - 17.0 g/dl    Glucose, i-STAT 67 65 - 140 mg/dl    Specimen Type VENOUS    Prepare Leukoreduced Whole Blood    Collection Time: 04/17/25  3:21 PM   Result Value Ref Range    Unit Product Code Q6747D01     Unit Number [id number]     Unit ABO O     Unit RH POS     Unit Dispense Status Return to Inv     Unit Product Volume 500 mL   HS Troponin I 2hr    Collection Time: 04/17/25  4:27 PM   Result Value Ref Range    hs TnI 2hr 117 (H) \"Refer to ACS Flowchart\"- see link ng/L    Delta 2hr hsTnI -13 <20 ng/L   Echo " complete w/ contrast if indicated    Collection Time: 04/17/25  4:29 PM   Result Value Ref Range    AV peak gradient 47 mmHg    Triscuspid Valve Regurgitation Peak Gradient 52.0 mmHg    Sinus of Valsalva, 2D 3.8 cm    RAA A4C 33.4 cm2    LA Volume Index (BP) 24.8 mL/m2    LV Diastolic Volume (BP) 200 mL    LV Systolic Volume (BP) 160 mL    MV Peak A Carlin 0.42 m/s    MV stenosis pressure 1/2 time 34 ms    MV regurgitant volume 51.0 mL    MV VTI 32.69 cm    MV Peak E Carlin 151 cm/s    MV peak gradient antegrade 11 mmHg    AV peak gradient 29 mmHg    LVOT stroke volume 72.00     MR max velocity 0.48 m/s    Radius 0.8 cm    Ao VTI 46.94 cm    Aortic valve peak velocity 2.7 m/s    LVOT peak VTI 15.2 cm    LVOT peak carlin 0.68 m/s    LVOT diameter 2.5 cm    E wave deceleration time 119 ms    E/A ratio 3.60     MV valve area p 1/2 method 6.50     MV mean gradient antegrade 3 mmHg    AV LVOT peak gradient 2 mmHg    AV mean gradient 17 mmHg    AV regurgitation pressure 1/2 time 336 ms    TR Peak Carlin 3.6 m/s    MV ERO 0.40 cm2    AV area peak carlin 1.2 cm2    AV area by cont VTI 1.6 cm2    LVOT mn grad 1.0 mmHg    RVID d 8.1 cm    A4C EF 21 %    Aortic valve mean velocity 19.30 m/s    Tricuspid valve peak regurgitation velocity 3.62 m/s    Left ventricular stroke volume (2D) 107.00 mL    IVSd 1.10 cm    Tricuspid annular plane systolic excursion 1.10 cm    Ao root 3.60 cm    Ao STJ 3.20 cm    Ao annulus 2.60 cm    LVPWd 1.30 cm    LA size 4.5 cm    Asc Ao 4.1 cm    STJ 3.2 cm    LA volume (BP) 54 mL    EF 20 %    FS 29 28 - 44    LVIDS 4.40 cm    IVS 1.1 cm    LVIDd 6.20 cm    LA length (A2C) 6.40 cm    AV Deceleration Time 1,158 ms    LEFT VENTRICLE SYSTOLIC VOLUME (MOD BIPLANE) 2D 90 mL    LV DIASTOLIC VOLUME (MOD BIPLANE) 2D 197 mL    LVOT Cardiac Index 2.81 l/min/m2    LVOT stroke volume index 33.00 ml/m2    LVOT Cardiac Output 6.13 l/min    Left Atrium Area-systolic Four Chamber 14.3 cm2    Left Atrium Area-systolic Apical Two  "Chamber 24.4 cm2    MV E' Tissue Velocity Lateral 14 cm/s    MV E' Tissue Velocity Septal 6 cm/s    LVSV, 2D 107 mL    LVOT area 4.91 cm2    MV valve area by continuity eq 2.28 cm2    DVI 0.32     AV valve area 1.59 cm2    BSA 2.18 m2    LV Diastolic Volume Index (BP) 91.7 mL/m2    LV Systolic Volume Index (BP) 73.4 mL/m2    LV EF 20     Est. RA pres 15.0 mmHg    Right Ventricular Peak Systolic Pressure 67.00 mmHg   Blood culture    Collection Time: 04/17/25  4:41 PM    Specimen: Central Venous Line; Blood   Result Value Ref Range    Blood Culture Received in Microbiology Lab. Culture in Progress.    Blood culture    Collection Time: 04/17/25  4:41 PM    Specimen: Arm, Right; Blood   Result Value Ref Range    Blood Culture Received in Microbiology Lab. Culture in Progress.    COVID/FLU/RSV    Collection Time: 04/17/25  4:46 PM    Specimen: Nose; Nares   Result Value Ref Range    SARS-CoV-2 Negative Negative    INFLUENZA A PCR Negative Negative    INFLUENZA B PCR Negative Negative    RSV PCR Negative Negative   HS Troponin I 4hr    Collection Time: 04/17/25  5:44 PM   Result Value Ref Range    hs TnI 4hr 107 (H) \"Refer to ACS Flowchart\"- see link ng/L    Delta 4hr hsTnI -23 <20 ng/L   Ammonia    Collection Time: 04/17/25  6:43 PM   Result Value Ref Range    Ammonia 25 18 - 72 umol/L   CBC and differential    Collection Time: 04/18/25  4:46 AM   Result Value Ref Range    WBC 4.65 4.31 - 10.16 Thousand/uL    RBC 3.48 (L) 3.88 - 5.62 Million/uL    Hemoglobin 10.9 (L) 12.0 - 17.0 g/dL    Hematocrit 34.7 (L) 36.5 - 49.3 %     (H) 82 - 98 fL    MCH 31.3 26.8 - 34.3 pg    MCHC 31.4 31.4 - 37.4 g/dL    RDW 18.8 (H) 11.6 - 15.1 %    MPV 11.7 8.9 - 12.7 fL    Platelets 106 (L) 149 - 390 Thousands/uL    nRBC 0 /100 WBCs    Segmented % 85 (H) 43 - 75 %    Immature Grans % 1 0 - 2 %    Lymphocytes % 8 (L) 14 - 44 %    Monocytes % 6 4 - 12 %    Eosinophils Relative 0 0 - 6 %    Basophils Relative 0 0 - 1 %    Absolute " Neutrophils 3.96 1.85 - 7.62 Thousands/µL    Absolute Immature Grans 0.03 0.00 - 0.20 Thousand/uL    Absolute Lymphocytes 0.38 (L) 0.60 - 4.47 Thousands/µL    Absolute Monocytes 0.27 0.17 - 1.22 Thousand/µL    Eosinophils Absolute 0.00 0.00 - 0.61 Thousand/µL    Basophils Absolute 0.01 0.00 - 0.10 Thousands/µL   Comprehensive metabolic panel    Collection Time: 04/18/25  4:46 AM   Result Value Ref Range    Sodium 135 135 - 147 mmol/L    Potassium 5.2 3.5 - 5.3 mmol/L    Chloride 95 (L) 96 - 108 mmol/L    CO2 27 21 - 32 mmol/L    ANION GAP 13 4 - 13 mmol/L    BUN 33 (H) 5 - 25 mg/dL    Creatinine 5.30 (H) 0.60 - 1.30 mg/dL    Glucose 92 65 - 140 mg/dL    Calcium 7.8 (L) 8.4 - 10.2 mg/dL    AST 13 13 - 39 U/L    ALT 9 7 - 52 U/L    Alkaline Phosphatase 130 (H) 34 - 104 U/L    Total Protein 6.5 6.4 - 8.4 g/dL    Albumin 3.7 3.5 - 5.0 g/dL    Total Bilirubin 0.97 0.20 - 1.00 mg/dL    eGFR 10 ml/min/1.73sq m   Magnesium    Collection Time: 04/18/25  4:46 AM   Result Value Ref Range    Magnesium 2.0 1.9 - 2.7 mg/dL   Phosphorus    Collection Time: 04/18/25  4:46 AM   Result Value Ref Range    Phosphorus 6.3 (H) 2.3 - 4.1 mg/dL   Protime-INR    Collection Time: 04/18/25  4:46 AM   Result Value Ref Range    Protime 17.5 (H) 12.3 - 15.0 seconds    INR 1.35 (H) 0.85 - 1.19   Procalcitonin    Collection Time: 04/18/25  4:46 AM   Result Value Ref Range    Procalcitonin 0.47 (H) <=0.25 ng/ml     Imaging: I have personally reviewed pertinent reports.    EKG: no ECG this admission since in chart  VTE Prophylaxis: eliquis    Code Status: Level 1 - Full Code  Advance Directive and Living Will:      Power of :    POLST:      Counseling / Coordination of Care  Total floor / unit time spent today 70 minutes.  Greater than 50% of total time was spent with the patient and / or family counseling and / or coordination of care.

## 2025-04-18 NOTE — ASSESSMENT & PLAN NOTE
-Outpatient dialysis [facility]  --Access: LUE AVF, aneurysmal  - EDW 96 kg  --Last HD/16/25 as outpatient  --Extubated and off pressors  -4/17-sodium, potassium, bicarbonate are appropriate.  Calcium was 8.  Troponin being trended.  Hemoglobin at 9.5.  Stable.    Plan  - Continue IHD MWF.  Plan for IHD today.  Orders on chart. Will attempt UF 1L  -- Next treatment 4/21/2025.  Will continue regular Monday, Wednesday, Friday schedule during hospitalization if remains stable

## 2025-04-18 NOTE — PROGRESS NOTES
"Progress Note - Trauma   Name: Asad Galloway 65 y.o. male I MRN: [id number]  Unit/Bed#: ICU 11 I Date of Admission: 4/17/2025   Date of Service: 4/18/2025 I Hospital Day: 1    Assessment & Plan  Fall  Patient suffered fall in a parking lot.  Story is unclear though it is believed to be associated with hypotension due to patient being found with a blood pressure in the 60s by EMS.  Primary and secondary evaluation were negative for acute traumatic injury  Trauma will continue to follow for tertiary evaluation on 4/18  See below  Shock (HCC)  Patient received 2 units of whole blood and was placed on Levophed for BP assistance  Bedside ultrasound completed that showed reduced EF  Admit to medical critical care for continued monitoring and management  Echocardiogram ordered  Acute hypoxic respiratory failure (HCC)  Patient became acutely hypoxic during initial evaluation requiring intubation  CXR concerning for acute CHF exacerbation  Admitted to medical critical care for continued monitoring and workup    VTE Prophylaxis: VTE covered by:    None         Disposition: ICU     TRAUMA TERTIARY SURVEY  Summary of Diagnosed Injuries: No traumatic injuries    Mechanism of Injury:Fall     Chief Complaint: \"I want a drink of water\"    24 Hour Events : No acute events overnight  Subjective : Presented after fall in parking lot with concerns of hypotension.  No traumatic injuries appreciated on exam.  Patient admitted due to shock requiring vasopressors and 2 units of blood.  Patient denies any traumatic injuries at this time.    Objective :  Temp:  [96.3 °F (35.7 °C)-99.1 °F (37.3 °C)] 97.5 °F (36.4 °C)  HR:  [] 93  BP: ()/(35-82) 97/54  Resp:  [17-41] 38  SpO2:  [84 %-100 %] 93 %  O2 Device: Nasal cannula  FiO2 (%):  [45-65] 45    I/O         04/16 0701  04/17 0700 04/17 0701  04/18 0700    I.V. (mL/kg)  61 (0.7)    Blood  700    Total Intake(mL/kg)  761 (8.3)    Net  +761                Lines/Drains/Airways       " Active Status       Name Placement date Placement time Site Days    Arterial Line 04/17/25 Right Radial 04/17/25  1444  Radial  less than 1    NG/OG/Enteral Tube Orogastric Center mouth 04/17/25  1434  Center mouth  less than 1    Urethral Catheter Temperature probe 04/17/25  1439  Temperature probe  less than 1                  Physical Exam  Constitutional:       General: He is not in acute distress.  HENT:      Head: Normocephalic.      Nose:      Comments: Bruising with small laceration of nasal bridge, no active bleeding, nares clear.     Mouth/Throat:      Mouth: Mucous membranes are moist.      Pharynx: Oropharynx is clear.   Eyes:      Extraocular Movements: Extraocular movements intact.      Conjunctiva/sclera: Conjunctivae normal.      Pupils: Pupils are equal, round, and reactive to light.   Cardiovascular:      Rate and Rhythm: Normal rate and regular rhythm.   Pulmonary:      Effort: Pulmonary effort is normal. No respiratory distress.      Breath sounds: Normal breath sounds. No wheezing or rales.   Chest:      Chest wall: No tenderness.   Abdominal:      Palpations: Abdomen is soft.      Tenderness: There is no abdominal tenderness.   Musculoskeletal:      Cervical back: Normal range of motion. No rigidity or tenderness.      Right lower leg: Edema present.      Left lower leg: Edema present.   Skin:     General: Skin is warm and dry.   Neurological:      General: No focal deficit present.      Mental Status: He is alert.      Sensory: No sensory deficit.      Motor: No weakness.        No data recordedISAR Score: Did you order a geriatric consult if the score was 2 or greater?: n/a           Lab Results: I have reviewed the following results:  Recent Labs     04/17/25  1347 04/17/25  1348 04/17/25  1440 04/17/25  1627 04/18/25  0446   WBC 5.07  --   --   --  4.65   HGB 9.5*   < > 10.2*  --  10.9*   HCT 31.3*   < > 30*  --  34.7*   *  --   --   --  106*   SODIUM 138  --   --   --  135   K 4.3   --   --   --  5.2   CL 96  --   --   --  95*   CO2 32   < > 21  --  27   BUN 25  --   --   --  33*   CREATININE 4.62*  --   --   --  5.30*   GLUC 89  --   --   --  92   CAIONIZED  --    < > 0.65*  --   --    MG  --   --   --   --  2.0   PHOS  --   --   --   --  6.3*   AST 13  --   --   --  13   ALT 9  --   --   --  9   ALB 3.8  --   --   --  3.7   TBILI 0.88  --   --   --  0.97   ALKPHOS 126*  --   --   --  130*   PTT 38*  --   --   --   --    INR 1.50*  --   --   --  1.35*   HSTNI0 130*  --   --   --   --    HSTNI2  --   --   --  117*  --    BNP >4,700*  --   --   --   --    LACTICACID 1.4  --   --   --   --     < > = values in this interval not displayed.

## 2025-04-18 NOTE — PLAN OF CARE
Problem: OCCUPATIONAL THERAPY ADULT  Goal: Performs self-care activities at highest level of function for planned discharge setting.  See evaluation for individualized goals.  Description: Treatment Interventions: ADL retraining, Functional transfer training, UE strengthening/ROM, Cognitive reorientation, Patient/family training, Equipment evaluation/education, Compensatory technique education, Neuromuscular reeducation, Continued evaluation, Activityengagement, Energy conservation, Endurance training          See flowsheet documentation for full assessment, interventions and recommendations.   Note: Limitation: Decreased ADL status, Decreased Safe judgement during ADL, Decreased UE strength, Decreased endurance, Decreased self-care trans, Decreased high-level ADLs, Decreased cognition (trunk control, functional reach, coordination, balance, activity tolerance, safety awareness, insight, attention.)  Prognosis: Good  Assessment: Patient is a 65 y.o. male seen for OT evaluation at [facility] following admission on 4/17/2025  s/p Fall. Please see above for comprehensive list of comorbidities and significant PMHx impacting functional performance.  Upon initial evaluation, pt appears to be performing below baseline functional status.   Occupational performance is affected by the following deficits: endurance ,  decreased muscular strength , impaired coordination , decreased balance , decreased standing tolerance for self care tasks , decreased functional reach , decreased trunk control , decreased activity tolerance , attention to task, impaired judgement and problem solving , and impaired safety awareness . Personal/Environmental factors impacting D/C include: Assistance needed for ADLs and functional mobility and baseline cognitive deficits. Supporting factors include: able to maintain FFSU and support system available Patient would benefit from OT services within the acute care setting to maximize  level of functional independence in the following areas self-care transfers, functional mobility, and ADLs.  From OT standpoint, recommendation at time of D/C would be Level 1: maximum resource intensity .     Rehab Resource Intensity Level, OT: I (Maximum Resource Intensity)   Priscila Guerra, OT

## 2025-04-18 NOTE — ASSESSMENT & PLAN NOTE
Ischemic cardiomyopathy with partial recovery of EF.  EF has ranged 25 to 40% over several years  TTE 3/1/2025 LVEF 35 to 40% with global hypokinesis with regional variations.  Moderate RV systolic reduction.  Severe left atrial dilatation.  Moderate to severe calcific aortic valve with moderate to severe aortic stenosis.  Current TTE LVEF 20%. Visually closer to 30%.   Beta-blocker and Entresto held for hypotension. Resume as BP tolerates   Recommend resuming life vest on discharge

## 2025-04-18 NOTE — ASSESSMENT & PLAN NOTE
Fell while getting out of car. Denied LOC. Wife reports weakness since the fall.  Blood pressure by EMS 60's. Typically walks short distances independently with his walker.  Increasing in lightheadedness and fatigue.  No chest pain or pressure.  Has exertional dyspnea.

## 2025-04-18 NOTE — ASSESSMENT & PLAN NOTE
Initially there was concern for shock and started on vasopressors  Off pressors  COVID, flu, RSV negative  Blood cultures pending  Home Rx: Toprol-XL 50 mg daily, Entresto 24-26 mg daily, midodrine 5 mg with HD  On Zosyn  Management per primary team

## 2025-04-18 NOTE — DISCHARGE INSTR - OTHER ORDERS
"Skin Care Plan:  1-Right knee abrasion and left arm skin tear: Cleanse wounds with NSS and pat dry. Apply Xeroform to wound bed and apply foam dressing. Jaime with \"T\" for treatment and change every other day.   2-Turn/reposition q2h or when medically stable for pressure re-distribution on skin .  3-Elevate heels to offload pressure  4-Moisturize skin daily with skin nourishing cream  5-Ehob cushion in chair when out of bed.  6-Preventative Hydraguard to buttocks, sacrum, and bilateral heels BID and PRN.  "

## 2025-04-18 NOTE — ASSESSMENT & PLAN NOTE
Shortly after arrival became hypoxic lying flat for CT scan requiring intubation. ? Related to timing of transfusion  CT of the chest no PE.  Mild interstitial edema seen on CT  Since extubated. On 2 liters.

## 2025-04-18 NOTE — ASSESSMENT & PLAN NOTE
Echo March 2025 LVEF 35-40%, global hypokinesis with regional variations, moderately reduced RV systolic function, severe left atrial dilation moderate to severely calcified aortic valve with moderate to severe aortic stenosis

## 2025-04-18 NOTE — PROGRESS NOTES
Progress Note - Critical Care/ICU   Name: Asad Galloway 65 y.o. male I MRN: [id number]  Unit/Bed#: ICU 11 I Date of Admission: 4/17/2025   Date of Service: 4/18/2025 I Hospital Day: 1      Assessment & Plan  Fall  Per wife patient was waiting in the car while she attended a doctor's appointment.  Patient exit the car without his walker and subsequently fell.  Denies LOC or preceding symptoms   Found hypotensive  Trauma scans without fracture  CTH negative  CT ab/pel: Small subcutaneous hematoma of right hip without active bleed.  Otherwise no acute traumatic injury.  Cirrhosis with portal hypertension and small volume ascites.  There was initial concern for    Plan   Fall precautions  PT/OT  Shock (HCC) (Resolved: 4/18/2025)  Hx of chronic hypotension on midodrine, ischemic cardiomyopathy with EF35-40%, Cirrhosis  Presents following fall. Noted to have profound hypotension 60/40 per report. Initial concern for hemorrhagic shock as bedside ultrasound showed abdominal fluid.  Patient was sent for stat CT but became hypoxic when lying flat and was intubated.  Patient was transfused 2 units of whole blood.  No evidence of sepsis  CTA chest - no evidence of PE  CT abdomen-Small subcutaneous hematoma in the right hip without evidence of active bleeding   Patient altered ,hypervolemia and well perfused, CXR pulmonary vascular congestion and pleural effusion   Lactic acid normal   Upon arrival to the ICU patient was on Levophed with minimal ventilator support.  Sedation was turned off and started a SBT that he passed.  Patient was then extubated to BiPAP and weaned to nasal cannula. Pt now on room air.    Plan   Monitor off antibiotics   Follow up blood and sputum cultures  Acute hypoxic respiratory failure (HCC)  Patient was noted to be hypoxic at the CT when laid flat and reportedly hypotensive and thus was intubated in the ED.  Reportedly has been coughing for ~1- 2 weeks per wife  COVID flu RSV negative  WBC  normal  procal 0.41  Urine antigens not obtained as patient is anuric  CT chest: No large PE, Cardiomegaly with small right pleural effusion and interstitial edema. Cirrhosis with ascites.  CXR: Mild pulmonary vascular congestion with trace pleural effusions    Plan  Monitoring off ABX  Follow up MRSA  Obtain sputum culture  Volume management with dialysis  ESRD on dialysis ([facility])  Lab Results   Component Value Date    EGFR 10 04/18/2025    EGFR 12 04/17/2025    EGFR 8 03/24/2025    CREATININE 5.30 (H) 04/18/2025    CREATININE 4.62 (H) 04/17/2025    CREATININE 6.44 (H) 03/24/2025     MWF schedule at [facility]  Access E AV  Nephrology consulted appreciate recommendations    CAD, multiple vessel  CAD S/P multiple stents with history over the past years, most recently in 2025 2/27/2025 [facility]: Mid circumflex 10%, first obtuse marginal 100%, first %, severe in-stent restenosis of LAD and  of OM1 ([facility])  3/17/2025 [facility] LM normal, proximal LAD 90%, circumflex luminal irregularities, first obtuse marginal 100%, right system not described, successful balloon angioplasty to proximal LAD with 0% residual stenosis ([facility])  He is on ASA, effient, statin  Resume home meds as able  HFrEF (heart failure with reduced ejection fraction) ([facility])  Wt Readings from Last 3 Encounters:   04/18/25 91.3 kg (201 lb 4.5 oz)   04/09/25 100 kg (221 lb)   04/01/25 97.1 kg (214 lb)        Combined systolic and diastolic HF, [facility]  Last echo March 2025- Ef 35-40% with grade III DD, global hypokinesis, severely LA enlargement  Medications;Toprol-XL 25 twice daily, Entresto 24-26 twice daily with Volume management: Dialysis  For this admission volume up with concern for cardiogenic shock    BNP >4700  Trop 130/ 117/107  CXR Mild pulmonary venous congestion with trace pleural effusions.   4/17 echo EF 20%, severe global hypokinesis, ungraded diastolic dysfunction, severe RV dilation moderate AAS, moderate MR moderate TR  Plan    Volume per Dialysis  Home home medications as able  Cardiology consulted for new reduction in EF, recommendations appreciated  Ischemic cardiomyopathy  Echo March 2025 LVEF 35-40%, global hypokinesis with regional variations, moderately reduced RV systolic function, severe left atrial dilation moderate to severely calcified aortic valve with moderate to severe aortic stenosis   Aortic stenosis  Severe AS, scheduled for TAVR in April 2025 at [facility]  Acute embolism and thrombosis of right peroneal vein (HCC)  Hx of upper extremity DVT in right upper extremity of eliquis 2.5 bid  Eliquis 2.5mg BID  Mixed hyperlipidemia  Continue home Lipitor 40    Anemia  Lab Results   Component Value Date    HGB 10.9 (L) 04/18/2025    HGB 10.2 (L) 04/17/2025    HGB 9.9 (L) 04/17/2025     Multifactorial etiology-ESRD, chronic disease  On admission Hb 9.5, s/p 2 unit whole blood for suspected hemorrhagic shock  F/u Folate, B12 given macrocytosis    Type 2 diabetes mellitus (HCC)    Lab Results   Component Value Date    HGBA1C 5.4 04/10/2025     Controlled  Will continue SSI  Hypoglycemia protocol  Cirrhosis (HCC)  Known hx cirrhosis. Re-demonstrated on CTA chest/PE 4/17  Disposition: Med Surg    ICU Core Measures     A: Assess, Prevent, and Manage Pain Has pain been assessed? Yes  Need for changes to pain regimen? No   B: Both SAT/SAT  N/A   C: Choice of Sedation RASS Goal: 0 Alert and Calm or N/A patient not on sedation  Need for changes to sedation or analgesia regimen? NA   D: Delirium CAM-ICU: Negative   E: Early Mobility  Plan for early mobility? Yes   F: Family Engagement Plan for family engagement today? Yes spoke with wife at bedside       Antibiotic Review: Awaiting culture results.     Review of Invasive Devices:    [place] Plan:  remove, patient aneuric    [place] Plan: Discontinue arterial line    Prophylaxis:  VTE VTE covered by:  apixaban, Oral       Stress Ulcer  not ordered         24 Hour Events : Pt extubated and  off pressors since 5 PM    Subjective  Patient seen and examined at bedside. He denies any, fevers, chills, nausea, vomiting, diarrhea.  He only endorses being hungry currently.  Wife at bedside says he has had a dry cough for approximately 2 weeks.     Review of Systems: See HPI for Review of Systems    Objective :                   Vitals I/O      Most Recent Min/Max in 24hrs   Temp 97.6 °F (36.4 °C) Temp  Min: 96.3 °F (35.7 °C)  Max: 99.1 °F (37.3 °C)   Pulse 93 Pulse  Min: 66  Max: 107   Resp (!) 38 Resp  Min: 17  Max: 41   BP 97/54 BP  Min: 65/39  Max: 157/72   O2 Sat 93 % SpO2  Min: 84 %  Max: 100 %      Intake/Output Summary (Last 24 hours) at 4/18/2025 1150  Last data filed at 4/17/2025 1712  Gross per 24 hour   Intake 760.98 ml   Output --   Net 760.98 ml       Diet Renal; Potassium 2 GM; Yes; Fluid Restriction 1800 ML; No; Sodium 2 Gm, Cardiac    Invasive Monitoring           Physical Exam   Physical Exam  Eyes:      Extraocular Movements: Extraocular movements intact.      Conjunctiva/sclera: Conjunctivae normal.      Pupils: Pupils are equal, round, and reactive to light.   Skin:     General: Skin is warm and dry.      Findings: Abrasion (R wrist, R pinky, R knee) and ecchymosis (R hip) present.   HENT:      Head: Normocephalic. Laceration (nasal bridged repaired with dermabond) present.      Mouth/Throat:      Mouth: Mucous membranes are moist.      Dentition: Abnormal dentition.   Cardiovascular:      Rate and Rhythm: Normal rate and regular rhythm.   Abdominal: General: Bowel sounds are normal.      Palpations: Abdomen is soft.      Tenderness: There is no abdominal tenderness.   Constitutional:       General: He is not in acute distress.     Appearance: He is well-developed and well-nourished.   Pulmonary:      Effort: Pulmonary effort is normal. No respiratory distress.      Comments: CTA anteriorly, difficult auscultate posteriorly    Neurological:      Mental Status: He is alert.    Genitourinary/Anorectal:  Betzy present.        Diagnostic Studies        Lab Results: I have reviewed the following results:     Medications:  Scheduled PRN   apixaban, 2.5 mg, BID  aspirin, 81 mg, Once  atorvastatin, 40 mg, Daily With Dinner  chlorhexidine, 15 mL, Q12H MICH  [Held by provider] Cholecalciferol, 1,000 Units, Daily  [Held by provider] cinacalcet, 30 mg, Daily With Breakfast  insulin lispro, 1-6 Units, 4x Daily (PC & HS)  lidocaine, 1 patch, Daily  [Held by provider] melatonin, 3 mg, HS  [Held by provider] metoprolol succinate, 25 mg, BID  prasugrel, 10 mg, Daily  [Held by provider] sacubitril-valsartan, 1 tablet, BID      acetaminophen, 500 mg, Q8H PRN  levalbuterol, 0.63 mg, Q6H PRN  midodrine, 10 mg, Before Dialysis       Continuous          Labs:   CBC    Recent Labs     04/17/25 1347 04/17/25 1348 04/17/25  1440 04/18/25  0446   WBC 5.07  --   --  4.65   HGB 9.5*   < > 10.2* 10.9*   HCT 31.3*   < > 30* 34.7*   *  --   --  106*    < > = values in this interval not displayed.     BMP    Recent Labs     04/17/25  1347 04/17/25  1348 04/17/25  1440 04/18/25  0446   SODIUM 138  --   --  135   K 4.3  --   --  5.2   CL 96  --   --  95*   CO2 32   < > 21 27   AGAP 10  --   --  13   BUN 25  --   --  33*   CREATININE 4.62*  --   --  5.30*   CALCIUM 8.0*  --   --  7.8*    < > = values in this interval not displayed.       Coags    Recent Labs     04/17/25  1347 04/18/25  0446   INR 1.50* 1.35*   PTT 38*  --         Additional Electrolytes  Recent Labs     04/17/25  1419 04/17/25  1440 04/18/25  0446   MG  --   --  2.0   PHOS  --   --  6.3*   CAIONIZED 0.85* 0.65*  --           Blood Gas    No recent results  Recent Labs     04/17/25  1347   PHVEN 7.397   GKN1QWW 46.1   PO2VEN 30.8*   [id number] 27.7   BEVEN 2.4   [id number] 52.7*    LFTs  Recent Labs     04/17/25  1347 04/18/25  0446   ALT 9 9   AST 13 13   ALKPHOS 126* 130*   ALB 3.8 3.7   TBILI 0.88 0.97       Infectious  Recent Labs      04/18/25  0446   PROCALCITONI 0.47*     Glucose  Recent Labs     04/17/25  1347 04/18/25  0446   GLUC 89 92

## 2025-04-18 NOTE — ASSESSMENT & PLAN NOTE
Calcium and magnesium stable  hx hyperphosphatemia.  Binders recently discontinued during recent hospitalization at [place].  Continue low phosphorus diet  Secondary hyperparathyroidism of renal origin: On Hectorol 2 mcg 3 times a week with HD and Sensipar 90 mg 3 times a week with HD  Continue to monitor and manage as outpatient

## 2025-04-18 NOTE — ASSESSMENT & PLAN NOTE
History of multivessel PCI.  Most recent 3/17/2025 at [place]-left main normal, proximal LAD 90%, circumflex luminal irregularities, first obtuse marginal 100%, right system not described, successful balloon angioplasty to proximal LAD with 0% residual stenosis. ( 2/27/2025-RPDA 90% with first %)  ADE to first marginal 5/2022, ADE to proximal-mid LAD and circumflex [facility], 6/27/2022 70% mid LAD treated with thrombectomy (lesion felt to be thrombosed, [facility]), 8/5/2022 balloon angioplasty to in-stent thrombosis of LAD, 2/27/2025 severe in-stent restenosis of LAD and  to OM1 ([facility])   0-hour troponin 130, 2-hour troponin 117, troponin 107. No ECG available , will check for completeness  On aspirin/effient,  statin.  BB held due to hypotension

## 2025-04-18 NOTE — PROCEDURES
"Universal Protocol:  Consent: Verbal consent obtained.  Consent given by: patient  Time out: Immediately prior to procedure a \"time out\" was called to verify the correct patient, procedure, equipment, support staff and site/side marked as required.  Timeout called at: 4/17/2025 8:00 PM.  Patient understanding: patient states understanding of the procedure being performed  Patient consent: the patient's understanding of the procedure matches consent given  Procedure consent: procedure consent matches procedure scheduled  Relevant documents: relevant documents present and verified  Test results: test results available and properly labeled  Site marked: the operative site was marked  Radiology Images displayed and confirmed. If images not available, report reviewed: imaging studies available  Required items: required blood products, implants, devices, and special equipment available  Patient identity confirmed: verbally with patient and arm band  Laceration repair    Date/Time: 4/17/2025 8:00 PM    Performed by: Narinder Go DO  Authorized by: Narinder Go DO  Body area: head/neck  Location details: nose  Laceration length: 0.5 cm  Foreign bodies: no foreign bodies  Tendon involvement: none  Nerve involvement: none  Vascular damage: no    Wound Dehiscence:  Superficial Wound Dehiscence: simple closure      Procedure Details:  Irrigation solution: saline  Irrigation method: syringe  Amount of cleaning: extensive  Skin closure: glue  Approximation: close  Approximation difficulty: simple  Patient tolerance: patient tolerated the procedure well with no immediate complications  Comments: Laceration repair with glue of stellate laceration along bridge of nose. Irrigated extensively with saline, no fbs.               "

## 2025-04-18 NOTE — ASSESSMENT & PLAN NOTE
Wt Readings from Last 3 Encounters:   04/18/25 91.3 kg (201 lb 4.5 oz)   04/09/25 100 kg (221 lb)   04/01/25 97.1 kg (214 lb)        Combined systolic and diastolic HF, [facility]  Last echo March 2025- Ef 35-40% with grade III DD, global hypokinesis, severely LA enlargement  Medications;Toprol-XL 25 twice daily, Entresto 24-26 twice daily with Volume management: Dialysis  For this admission volume up with concern for cardiogenic shock    BNP >4700  Trop 130/ 117/107  CXR Mild pulmonary venous congestion with trace pleural effusions.   4/17 echo EF 20%, severe global hypokinesis, ungraded diastolic dysfunction, severe RV dilation moderate AAS, moderate MR moderate TR  Plan   Volume per Dialysis  Home home medications as able  Cardiology consulted for new reduction in EF, recommendations appreciated

## 2025-04-18 NOTE — ASSESSMENT & PLAN NOTE
Concerns noted for possible hemorrhagic shock.  Given 2 units of PRBCs.  H&H stable.  No signs of active bleeding.

## 2025-04-18 NOTE — ASSESSMENT & PLAN NOTE
Lab Results   Component Value Date    HGBA1C 5.4 04/10/2025     Controlled  Will continue SSI  Hypoglycemia protocol

## 2025-04-18 NOTE — ASSESSMENT & PLAN NOTE
Current TTE LVEF 20% with severe global hypokinesis.  Severely abnormal diastolic function.  RV severely dilated with mildly reduced function moderate AI.  Moderate aortic stenosis.  BNP greater than 4700 (2769 when hospitalized with acute encephalopathy/SIRS)  As above  Volume management with HD

## 2025-04-18 NOTE — OCCUPATIONAL THERAPY NOTE
Occupational Therapy Evaluation     Patient Name: Asad Galloway  Today's Date: 4/18/2025  Problem List  Principal Problem:    Fall  Active Problems:    Mixed hyperlipidemia    Anemia    ESRD on dialysis (HCC)    CAD, multiple vessel    Acute hypoxic respiratory failure (HCC)    Ischemic cardiomyopathy    Aortic stenosis    Acute embolism and thrombosis of right peroneal vein (HCC)    Type 2 diabetes mellitus (HCC)    Chronic kidney disease-mineral and bone disorder    HFrEF (heart failure with reduced ejection fraction) (HCC)    Cirrhosis (HCC)    Past Medical History  Past Medical History:   Diagnosis Date    Cerebrovascular accident (CVA) due to thrombosis of left middle cerebral artery (HCC) 07/29/2018    Chronic kidney disease     Coronary artery disease     Diabetes mellitus (HCC)     not on meds    Dialysis patient (HCC)     M-W-F    Fistula     left upper arm for hemodialyis    GERD (gastroesophageal reflux disease)     History of coronary artery stent placement     x3    Hypercholesteremia     Hyperlipidemia     Hypertension     Infectious viral hepatitis     B as child    Limb alert care status     no BP/IV left arm    Neuropathy     Nonhealing ulcer of heel (HCC) 04/25/2023    Obesity     Osteomyelitis ([facility])     last assessed 11/4/16-per son not currently    Penetrating foot wound, left, initial encounter 01/19/2022    PVC's (premature ventricular contractions)     sees [facility]    Stroke ([facility])     last weeof July 2018 [facility]    TIA (transient ischemic attack) 10/28/2018    Ulcer of left heel, limited to breakdown of skin ([facility]) 06/13/2024    Wears dentures     Wears glasses      Past Surgical History  Past Surgical History:   Procedure Laterality Date    ABDOMINAL SURGERY      CARDIAC CATHETERIZATION N/A 05/02/2022    Procedure: Cardiac Coronary Angiogram;  Surgeon: Sam Davis MD;  [facility]: [facility];  Service: Cardiology    CARDIAC CATHETERIZATION N/A 05/02/2022     "Procedure: Cardiac pci;  Surgeon: Sam Davis MD;  [facility]: [facility];  Service: Cardiology    CARDIAC CATHETERIZATION  02/01/2023    Procedure: Cardiac catheterization;  Surgeon: Sam Davis MD;  [facility];  Service: Cardiology    CARDIAC CATHETERIZATION N/A 02/01/2023    Procedure: Cardiac pci;  Surgeon: Sam Davis MD;  [facility];  Service: Cardiology    CARDIAC CATHETERIZATION N/A 02/01/2023    Procedure: Cardiac Coronary Angiogram;  Surgeon: Sam Davis MD;  [facility];  Service: Cardiology    CARDIAC CATHETERIZATION N/A 02/01/2023    Procedure: Cardiac other-IVUS;  Surgeon: Sam Davis MD;  [facility];  Service: Cardiology    CHOLECYSTECTOMY      Percutaneous    COLONOSCOPY      CORONARY ANGIOPLASTY WITH STENT PLACEMENT      CYSTOSCOPY      HEMODIALYSIS ADULT  1/22/2024    HEMODIALYSIS ADULT  01/24/2024    IR LOWER EXTREMITY ANGIOGRAM  9/29/2023    IR LOWER EXTREMITY ANGIOGRAM  02/26/2024    OTHER SURGICAL HISTORY      \"stimulator to control bowel movements\"    WI ESOPHAGOGASTRODUODENOSCOPY TRANSORAL DIAGNOSTIC N/A 09/27/2016    Procedure: ESOPHAGOGASTRODUODENOSCOPY (EGD);  Surgeon: Adele Rowe MD;  Location: [facility] GI LAB;  Service: Gastroenterology    WI LAPAROSCOPY SURG CHOLECYSTECTOMY N/A 02/29/2016    Procedure: LAPAROSCOPIC CHOLECYSTECTOMY ;  Surgeon: Cliff Roman DO;  Location: [facility] Main [facility];  Service: General    WI SLCTV CATHJ 3RD+ ORD SLCTV ABDL PEL/LXTR BRNCH Left 9/29/2023    Procedure: Left leg angiogram with intervention;  Surgeon: Michelle Galvan MD;  Location: [facility] MAIN [facility];  Service: Vascular    WI SLCTV CATHJ 3RD+ ORD SLCTV ABDL PEL/LXTR BRNCH Left 02/26/2024    Procedure: Left leg angiogram antegrade access, left popliteal angioplasty;  Surgeon: Michelle Galvan MD;  Location: [facility];  Service: Vascular    ROTATOR CUFF REPAIR Right     SPINAL CORD STIMULATOR IMPLANT      \"Medtronic interstim " "model # 3058- in lower back to control bowel movements-currently turned off-battery is dead\"    TOE AMPUTATION Right 10/28/2016    Procedure: 3RD TOE AMPUTATION ;  Surgeon: Anjel Salas DPM;  Location: [facility];  Service:              04/18/25 1222   OT Last Visit   OT Visit Date 04/18/25   Note Type   Note type Evaluation   Pain Assessment   Pain Assessment Tool 0-10   Pain Score No Pain   Restrictions/Precautions   Weight Bearing Precautions Per Order No   Other Precautions Cognitive;Chair Alarm;Bed Alarm;Fall Risk;Pain;Multiple lines;O2  (R hemiparesis)   Home Living   Type of Home House  (bilevel)   Home Layout One level;Performs ADLs on one level;Able to live on main level with bedroom/bathroom;Stairs to enter with rails   Bathroom Shower/Tub Walk-in shower   Bathroom Toilet Raised   Bathroom Equipment Built-in shower seat;Grab bars in shower;Grab bars around toilet   Home Equipment Walker  (bed rail. reports in process of ordering WC c OP therapists)   Prior Function   Level of [place] Independent with functional mobility;Needs assistance with IADLS;Needs assistance with ADLs  (A c bathing, A with LB Dressing.)   Lives With Spouse   Receives Help From Family;Outpatient therapy  (active c OP OT and PT)   IADLs Family/Friend/Other provides meals;Family/Friend/Other provides medication management;Family/Friend/Other provides transportation   Falls in the last 6 months 1 to 4  (2 per pt)   Vocational Retired  ()   Comments limited to household distances at baseline c RW. Spouse reports pt is never alone   Lifestyle   Autonomy PTA pt is A with ADLs, A with IADLs. mod (I) c RW. Lives c spouse.   Reciprocal Relationships supportive sons, spouse   Service to Others retired    General   Additional Pertinent History Pt admitted d/t fall , hypotension. Complicated by ESRD on HD, CAD, acute hypoxic respiratory failure, ischemic cardiomypopathy, aortic stenosis, acute embolism and " thrombosis of R peroneal vein, DM2, CKD, HFrEF, cirrhosis, hx of CVA in 2018 c R residual deficits, mood disorder c emotional lability, PVD, ambulatory dysfunction, thromboycytopenia,   Family/Caregiver Present Yes  (spouse and son)   Subjective   Subjective agreeable to OT session, benefits from encouragement from family during session   ADL   Where Assessed Sitting at sink   Eating Assistance 5  Supervision/Setup   Grooming Assistance 4  Minimal Assistance   UB Bathing Assistance 3  Moderate Assistance   LB Bathing Assistance 2  Maximal Assistance   UB Dressing Assistance 3  Moderate Assistance   LB Dressing Assistance 2  Maximal Assistance   Toileting Assistance  2  Maximal Assistance   Functional Assistance 2  Maximal Assistance   Additional Comments limited by functional  reach, trunk control, balance, copordination, cognitive status   Bed Mobility   Supine to Sit 2  Maximal assistance   Additional items Increased time required;Verbal cues;LE management;Assist x 1  (trunk management)   Sit to Supine 2  Maximal assistance   Additional items Assist x 2;Increased time required;Verbal cues;LE management  (trunk management)   Additional Comments Sat EOB x 7 minutes with variable CGA to max A to maintain static sitting. persistent retropulsion needing multimode cueing for maintaining upright position. Complains of dizziness. Pt makes multiple impulsive attempts to stand despite dizziness, nausea and needing A to maintain sitting balance- explaination of deferring d/t safety.  (BP at EOB 90s/60s , symptomatic)   Transfers   Sit to Stand Unable to assess   Stand to Sit Unable to assess   Additional Comments deferred OOB transfers on this date d/t poor trunk control and complaint of progressive dizziness. Placed in chair position at end of session   Balance   Static Sitting Poor +   Dynamic Sitting Poor   Static Standing   (LEE)   Activity Tolerance   Activity Tolerance Patient limited by fatigue;Other  (Comment)  (dizziness , nausea)   Medical Staff Made Aware MIKE Reyes   Nurse Made Aware THIERNO Rdz throughout session   RUE Assessment   RUE Assessment WFL  (MMT 3-/5 based on functional assessment)   LUE Assessment   LUE Assessment WFL  (MMT 3+/5 based on functional assessment)   Hand Function   Gross Motor Coordination Impaired   Fine Motor Coordination Impaired   Sensation   Light Touch No apparent deficits   Vision-Basic Assessment   Current Vision Wears glasses all the time   Patient Visual Report   (denies acute vision changes since fall c HS)   Vision - Complex Assessment   Acuity Able to read clock/calendar on wall without difficulty   Cognition   Overall Cognitive Status Impaired   Arousal/Participation Alert;Cooperative   Attention Attends with cues to redirect   Orientation Level Oriented to person;Oriented to place;Disoriented to time;Oriented to situation  (Reports March 1995)   Memory Decreased recall of precautions;Decreased recall of recent events;Decreased short term memory   Following Commands Follows one step commands with increased time or repetition   Comments poor insight to deficits, poor safety awareness, attention deficits noted. R facial droop noted, baseline 2/2 2018 CVA per RN   Assessment   Limitation Decreased ADL status;Decreased Safe judgement during ADL;Decreased UE strength;Decreased endurance;Decreased self-care trans;Decreased high-level ADLs;Decreased cognition  (trunk control, functional reach, coordination, balance, activity tolerance, safety awareness, insight, attention.)   Prognosis Good   Assessment Patient is a 65 y.o. male seen for OT evaluation at [facility] following admission on 4/17/2025  s/p Fall. Please see above for comprehensive list of comorbidities and significant PMHx impacting functional performance.  Upon initial evaluation, pt appears to be performing below baseline functional status.   Occupational performance is affected by the following  deficits: endurance ,  decreased muscular strength , impaired coordination , decreased balance , decreased standing tolerance for self care tasks , decreased functional reach , decreased trunk control , decreased activity tolerance , attention to task, impaired judgement and problem solving , and impaired safety awareness . Personal/Environmental factors impacting D/C include: Assistance needed for ADLs and functional mobility and baseline cognitive deficits. Supporting factors include: able to maintain FFSU and support system available Patient would benefit from OT services within the acute care setting to maximize level of functional independence in the following areas self-care transfers, functional mobility, and ADLs.  From OT standpoint, recommendation at time of D/C would be Level 1: maximum resource intensity .   Goals   Patient Goals family expressing goal for pt to be more mobile and walk more   Plan   Treatment Interventions ADL retraining;Functional transfer training;UE strengthening/ROM;Cognitive reorientation;Patient/family training;Equipment evaluation/education;Compensatory technique education;Neuromuscular reeducation;Continued evaluation;Activityengagement;Energy conservation;Endurance training   Goal Expiration Date 04/28/25   OT Treatment Day 0   OT Frequency 3-5x/wk   Discharge Recommendation   Rehab Resource Intensity Level, OT I (Maximum Resource Intensity)   Additional Comments  The patient's raw score on the -PAC Daily Activity Inpatient Short Form is 12. A raw score of less than 19 suggests the patient may benefit from discharge to post-acute rehabilitation services. Please refer to the recommendation of the Occupational Therapist for safe discharge planning.   -PAC Daily Activity Inpatient   Lower Body Dressing 2   Bathing 2   Toileting 1   Upper Body Dressing 2   Grooming 2   Eating 3   Daily Activity Raw Score 12   Daily Activity Standardized Score (Calc for Raw Score >=11) 30.6    AM-PAC Applied Cognition Inpatient   Following a Speech/Presentation 2   Understanding Ordinary Conversation 3   Taking Medications 2   Remembering Where Things Are Placed or Put Away 2   Remembering List of 4-5 Errands 2   Taking Care of Complicated Tasks 1   Applied Cognition Raw Score 12   Applied Cognition Standardized Score 28.82   End of Consult   Education Provided Yes   Patient Position at End of Consult Bed/Chair alarm activated;All needs within reach;Other (comment)  (chair position)   Nurse Communication Nurse aware of consult   Goals established on initial evaluation in order to achieve pt/family goal of improving overall mobility      Pt will complete UB ADLs Supervision  for increased ADL independence within 10 days.     Pt will complete LB ADLs Min A c LHAE  for increased ADL independence within 10 days.     Pt will complete toileting Mod A   with use of DME for increased ADL independence within 10 days.     Pt will demonstrate proper body mechanics to complete self-care transfers  with Mod A  and use of LRAD for increased safety and functional independence within 10 days.     Functional mobility goals once appropriate     Pt will demonstrate sitting tolerance of 30 min during session for increased activity tolerance during ADL/IADL tasks within 10 days.     Pt will demonstrate fair+ static / dynamic sitting balance  during session for increased activity tolerance during ADL/IADL tasks within 10 days.        Pt will complete bed mobility Min A for increased independence in repositioning, pressure offloading, and managing comfort.     Pt will demonstrate proper body mechanics and fall prevention strategies during 100% of tx sessions for increased safety awareness during ADL/IADLs    Pt will demonstrate activity tolerance of 30 min in therapeutic tasks for increased participation in meaningful activities upon D/C.    Pt will attend to treatment task or activity for 4 minutes without need for  redirection to improve activity engagement within 10 days.     Pt will demonstrate OOB sitting tolerance of 2-4 hr/day for increased activity tolerance and engagement in leisure activities within 10 days.     Priscila Guerra, OT

## 2025-04-18 NOTE — PROGRESS NOTES
Progress Note - Nephrology   Name: Asad Galloway 65 y.o. male I MRN: [id number]  Unit/Bed#: ICU 11 I Date of Admission: 4/17/2025   Date of Service: 4/18/2025 I Hospital Day: 1       Brief Hx: 65-year-old male with ESRD on HD, CAD, s/p recent PCI/stent, AS awaiting TAVR, HTN, HLD, PAD, DVT p/w fall and hypotension requiring intubation, pressors on presentation.  Nephrology consulted for management of ESRD/HD.  Assessment & Plan  ESRD on dialysis (HCC)  -Outpatient dialysis MWF [facility]  --Access: LUE AVF, aneurysmal  - EDW 96 kg  --Last HD/16/25 as outpatient  --Extubated and off pressors  -4/17-sodium, potassium, bicarbonate are appropriate.  Calcium was 8.  Troponin being trended.  Hemoglobin at 9.5.  Stable.    Plan  - Continue IHD MWF.  Plan for IHD today.  Orders on chart. Will attempt UF 1L  -- Next treatment 4/21/2025.  Will continue regular Monday, Wednesday, Friday schedule during hospitalization if remains stable  Fall  Etiology unclear.    Workup in progress by primary team.  Anemia  Hgb 10.9, at goal.  Goal Hgb >10.0  S/p 2 units PRBC on admission due to concern for bleeding.  Scan negative  on Mircera 75 mcg every 2 weeks as outpatient  trend CBC  transfuse for Hgb <7.0  management per primary team    Chronic kidney disease-mineral and bone disorder  Calcium and magnesium stable  hx hyperphosphatemia.  Binders recently discontinued during recent hospitalization at [facility].  Continue low phosphorus diet  Secondary hyperparathyroidism of renal origin: On Hectorol 2 mcg 3 times a week with HD and Sensipar 90 mg 3 times a week with HD  Continue to monitor and manage as outpatient  Acute embolism and thrombosis of right peroneal vein ([facility])  Per primary team  HFrEF (heart failure with reduced ejection fraction) ([facility])  EF 35 to 40% with grade 2 diastolic dysfunction with moderate to severe aortic stenoses, mild to moderate MR, mild to moderate TR  Patient with known CAD with recent stent placement at  [facility] in preparation for TAVR  Continue UF with HD as tolerated.  Plan UF 1L today  Management per primary team  Shock (HCC)  Initially there was concern for shock and started on vasopressors  Off pressors  COVID, flu, RSV negative  Blood cultures pending  Home Rx: Toprol-XL 50 mg daily, Entresto 24-26 mg daily, midodrine 5 mg with HD  On Zosyn  Management per primary team  Cirrhosis (HCC)  Per primary team  Aortic stenosis  Scheduled for TAVR at [place] mid April  Type 2 diabetes mellitus (HCC)  Lab Results   Component Value Date    HGBA1C 5.4 04/10/2025   stable  continue to optimize glycemic control  management per primary team      Plan Summary:  - Continue IHD MWF.  Plan for IHD today  -Next treatment 4/21/2025  -Fall/syncope workup per primary team    SUBJECTIVE:  Patient awake, alert without complaints.  BP soft. Angry and Upset that he has not been able to eat.  Labs stable.     A complete review of systems was performed. Pertinent positives and negatives noted in the HPI. Otherwise the review of systems was negative.  OBJECTIVE:  Current Weight: Weight - Scale: 91.3 kg (201 lb 4.5 oz)  Vitals:    04/18/25 0445 04/18/25 0515 04/18/25 0551 04/18/25 0700   BP: 108/52 97/54     Pulse: 94 93     Resp: (!) 41 (!) 38     Temp:    97.6 °F (36.4 °C)   TempSrc:    Axillary   SpO2: 93%      Weight:   91.3 kg (201 lb 4.5 oz)    Height:           Intake/Output Summary (Last 24 hours) at 4/18/2025 0953  Last data filed at 4/17/2025 1712  Gross per 24 hour   Intake 760.98 ml   Output --   Net 760.98 ml       General:  Awake, alert, appears comfortable and in no acute distress.  Nontoxic.  Skin:  No rash, warm, good skin turgor   Eyes:  PERRL, EOMI, sclerae nonicteric.  no periorbital edema   ENT:  Moist mucous membranes.  Laceration nose  Neck:  Trachea midline, symmetric.  No JVD.  Chest:  Clear to auscultation bilaterally without wheezes, crackles or rhonchi  CVS:  Regular rate and rhythm without murmur,  gallop or rub.  S1 and S2 identified and normal.  No S3, S4.   Abdomen:  Soft, nontender, nondistended without masses.  Normal bowel sounds x 4 quadrants.  No bruit.  Extremities:  Warm, pink, motor and sensory intact and well perfused.  No cyanosis, pallor.  No BLE edema.  Neuro:  Awake, alert, oriented x3.  Grossly intact  Psych:  Appropriate affect.  Mentating appropriately.  Normal mental status exam  Access: + Thrill, bruit LUE AVF, aneurysmal without ulceration      Medications:    Current Facility-Administered Medications:     acetaminophen (Ofirmev) IVPB 500 mg, 500 mg, Intravenous, Q8H PRN, BRITTANY Durbin, Last Rate: 200 mL/hr at 04/18/25 0143, 500 mg at 04/18/25 0143    atorvastatin (LIPITOR) tablet 40 mg, 40 mg, Oral, Daily With Dinner, Almita Rodriguez MD    chlorhexidine (PERIDEX) 0.12 % oral rinse 15 mL, 15 mL, Mouth/Throat, Q12H MICH, Almita Rodriguez MD, 15 mL at 04/17/25 2310    [Held by provider] Cholecalciferol (VITAMIN D3) tablet 1,000 Units, 1,000 Units, Oral, Daily, Almita Rodriguez MD    [Held by provider] cinacalcet (SENSIPAR) tablet 30 mg, 30 mg, Oral, Daily With Breakfast, Almita Rodriguez MD    levalbuterol (XOPENEX) inhalation solution 0.63 mg, 0.63 mg, Nebulization, Q6H PRN, Franci Reis MD, 0.63 mg at 04/17/25 1758    lidocaine (LIDODERM) 5 % patch 1 patch, 1 patch, Topical, Daily, BRITTANY Durbin, 1 patch at 04/18/25 0112    [Held by provider] melatonin tablet 3 mg, 3 mg, Oral, HS, Almita Rodriguez MD    [Held by provider] metoprolol succinate (TOPROL-XL) 24 hr tablet 25 mg, 25 mg, Oral, BID, Almita Rodriguez MD    midodrine (PROAMATINE) tablet 10 mg, 10 mg, Oral, Before Dialysis, Almita Rodriguez MD    piperacillin-tazobactam (ZOSYN) 4.5 g in sodium chloride 0.9 % 100 mL IV LOADING DOSE, 4.5 g, Intravenous, Once **FOLLOWED BY** piperacillin-tazobactam (ZOSYN) 4.5 g in  sodium chloride 0.9 % 100 mL IVPB (EXTENDED INFUSION), 4.5 g, Intravenous, Q12H, Almita Rodriguez MD    [Held by provider] prasugrel (EFFIENT) tablet 10 mg, 10 mg, Oral, Daily, Almita Rodriguez MD    [Held by provider] sacubitril-valsartan (ENTRESTO) 24-26 MG per tablet 1 tablet, 1 tablet, Oral, BID, Almita Rodriguez MD    Laboratory Results:  Results from last 7 days   Lab Units 04/18/25  0446 04/17/25  1440 04/17/25  1419 04/17/25  1348 04/17/25  1347 04/17/25  1020   WBC Thousand/uL 4.65  --   --   --  5.07 6.09   HEMOGLOBIN g/dL 10.9*  --   --   --  9.5* 10.0*   I STAT HEMOGLOBIN g/dl  --  10.2* 9.9* 10.5*  --   --    HEMATOCRIT % 34.7*  --   --   --  31.3* 33.1*   HEMATOCRIT, ISTAT %  --  30* 29* 31*  --   --    PLATELETS Thousands/uL 106*  --   --   --  108* 105*   SODIUM mmol/L 135  --   --   --  138  --    POTASSIUM mmol/L 5.2  --   --   --  4.3  --    CHLORIDE mmol/L 95*  --   --   --  96  --    CO2 mmol/L 27  --   --   --  32  --    CO2, I-STAT mmol/L  --  21 31 33*  --   --    BUN mg/dL 33*  --   --   --  25  --    CREATININE mg/dL 5.30*  --   --   --  4.62*  --    CALCIUM mg/dL 7.8*  --   --   --  8.0*  --    MAGNESIUM mg/dL 2.0  --   --   --   --   --    PHOSPHORUS mg/dL 6.3*  --   --   --   --   --    GLUCOSE, ISTAT mg/dl  --  67 79 89  --   --        I have personally reviewed the blood work as stated above and in my note.  I have personally reviewed internal Medicine, co-consultants and previous nephrology notes.

## 2025-04-18 NOTE — ASSESSMENT & PLAN NOTE
Hgb 10.9, at goal.  Goal Hgb >10.0  S/p 2 units PRBC on admission due to concern for bleeding.  Scan negative  on Mircera 75 mcg every 2 weeks as outpatient  trend CBC  transfuse for Hgb <7.0  management per primary team

## 2025-04-18 NOTE — ASSESSMENT & PLAN NOTE
Patient was noted to be hypoxic at the CT when laid flat and reportedly hypotensive and thus was intubated in the ED.  Reportedly has been coughing for ~1- 2 weeks per wife  COVID flu RSV negative  WBC normal  procal 0.41  Urine antigens not obtained as patient is anuric  CT chest: No large PE, Cardiomegaly with small right pleural effusion and interstitial edema. Cirrhosis with ascites.  CXR: Mild pulmonary vascular congestion with trace pleural effusions    Plan  Monitoring off ABX  Follow up MRSA  Obtain sputum culture  Volume management with dialysis

## 2025-04-18 NOTE — ASSESSMENT & PLAN NOTE
Lab Results   Component Value Date    HGBA1C 5.4 04/10/2025   stable  continue to optimize glycemic control  management per primary team

## 2025-04-18 NOTE — PLAN OF CARE
Post-Dialysis RN Treatment Note    Blood Pressure:  Pre:  97/50  mm/Hg  Post:  98/48 mmHg   EDW:  96.0 kg    Weight:  Pre: 91.3 kg   Post:  Unable to obtain   Mode of weight measurement: Bed Scale   Volume Removed:  1000 ml    Treatment duration: 180 minutes    NS given:  No    Treatment shortened Yes, describe: Patient request   Medications given during Rx: None Reported   Estimated Kt/V:  Not Applicable   Access type: AV fistula   Needle Gauge: 15 gauge   Access Issues: No    Report called to primary nurse:   Kendell Peterson    Problem: METABOLIC, FLUID AND ELECTROLYTES - ADULT  Goal: Electrolytes maintained within normal limits  Description: INTERVENTIONS:- Monitor labs and assess patient for signs and symptoms of electrolyte imbalances- Administer electrolyte replacement as ordered- Monitor response to electrolyte replacements, including repeat lab results as appropriate- Instruct patient on fluid and nutrition as appropriate  Outcome: Progressing  Goal: Fluid balance maintained  Description: INTERVENTIONS:- Monitor labs - Monitor I/O and WT- Instruct patient on fluid and nutrition as appropriate- Assess for signs & symptoms of volume excess or deficit  Outcome: Progressing

## 2025-04-18 NOTE — ASSESSMENT & PLAN NOTE
CAD S/P multiple stents with history over the past years, most recently in 2025 2/27/2025 [facility]: Mid circumflex 10%, first obtuse marginal 100%, first %, severe in-stent restenosis of LAD and  of OM1 ([facility])  3/17/2025 [facility] LM normal, proximal LAD 90%, circumflex luminal irregularities, first obtuse marginal 100%, right system not described, successful balloon angioplasty to proximal LAD with 0% residual stenosis ([facility])  He is on ASA, effient, statin  Resume home meds as able

## 2025-04-18 NOTE — ASSESSMENT & PLAN NOTE
On HD per nephrology.  Has been on dialysis about 5 years.  For dialysis today with midodrine.  Received volume expansion yesterday in the setting of hypotension does not appear grossly volume overloaded but does have interstitial edema on CT of the chest.  Renal planning for 1 L ultrafiltration.

## 2025-04-18 NOTE — ASSESSMENT & PLAN NOTE
Moderate severe calcific aortic stenosis with LV dysfunction.  Current echo LVEF 20% with RV dysfunction.  Moderate AI.  Moderate aortic stenosis.  Mean gradient 17 mmHg.  Dimensionless velocity index 0.32.  Additionally there is moderate MR.  RVSP 67 mmHg.  Undergoing TAVR workup at [place]. Scheduled in May. Will likely need rehab post hospitalization prior to TAVR.

## 2025-04-18 NOTE — WOUND OSTOMY CARE
Consult Note - Wound   Asad Galloway 65 y.o. male MRN: [id number]  Unit/Bed#: ICU 11 Encounter: [id number]        History and Present Illness:  Patient is a 65- yo male that was admitted to [facility] for treatment of a fall. Patient has a PMH of heart failure and ESRD and is on dialysis. Patient is an assist x1 for turning and repositioning. Patient is continent of bowel and bladder. On assessment, patient is awake, supine in bed, and agreeable to today's assessment. Patient is independent with meals. Spouse is at bedside.    Wound Care was consulted for multiple wounds.    Assessment Findings:   B/L heels are dry intact and dee with no skin loss or wounds present. Intact callus noted on left heel. Recommend preventative Hydraguard Cream and proper offloading/ repositioning.      B/L sacro-buttocks is dry, intact, pink in color and blanches. No skin loss or wounds present. Recommend preventative hydragaurd to area due to incontinence.     Right anterior knee abrasion: Partial thickness wound. Shallow irregular shaped wound bed is beefy red in color. Small amount of sanguinous drainage. Periwound fragile and intact. Recommend Xeroform and foam dressing.  Right posterior hand abrasion: Two partial thickness wounds measured together, fragile and pink in color. Scant amount of sanguinous drainage. Periwound is pink and fragile but intact. Recommend Xeroform and dry dressing.  Left posterior arm skin tear: Partial thickness wound. Irregular shape, beefy red wound bed with scant sanguinous drainage. Periwound fragile and intact. Recommend Xeroform and foam dressing.  Right great toe, D1, right foot: Partial thickness wound. Oval shaped wound bed, pink and tan in color with no drainage. Recommend No-Sting Barrier Skin Prep and dry dressing.  Left index finger, D2, left hand: Dry adherent scab noted. No open wound, no drainage. Recommend No-Sting Barrier Skin Prep to site.    No induration, fluctuance, odor,  "warmth/temperature differences, redness, or purulence noted to the above noted wounds and skin areas assessed. New dressings applied per orders listed below. Patient tolerated well- no s/s of non-verbal pain or discomfort observed during the encounter. Bedside nurse aware of plan of care. See flow sheets for more detailed assessment findings.      Orders listed below and wound care will continue to follow, call or Secure Chat Message with questions.     Skin Care Plan:  1-Right knee abrasion and left arm skin tear: Cleanse wounds with NSS and pat dry. Apply Xeroform to wound bed and apply foam dressing. Jaime with \"T\" for treatment and change every other day.  2-Right hand abrasion: Cleanse wounds with NSS and pat dry. Apply Xeroform to wound bed and cover with ABD and wrap with Javi. Jaime with \"T\" for treatment and change every other day.  3-Right great toe, Left index finger: Cleanse with NSS and pat dry. Apply No-Sting Barrier Skin Prep daily and keep right great toe covered with 4x4 dressing.  4-Turn/reposition q2h or when medically stable for pressure re-distribution on skin.  5-Elevate heels to offload pressure.  6-Moisturize skin daily with skin nourishing cream  7-Ehob cushion in chair when out of bed.  8-Preventative Hydraguard to bilateral heels BID and PRN.     Wounds:  Wound 07/15/24 Heel Left (Active)   Wound Image   04/18/25 0929   Wound Length (cm) 0 cm 04/18/25 0929   Wound Width (cm) 0 cm 04/18/25 0929   Wound Depth (cm) 0 cm 04/18/25 0929   Wound Surface Area (cm^2) 0 cm^2 04/18/25 0929   Wound Volume (cm^3) 0 cm^3 04/18/25 0929   Calculated Wound Volume (cm^3) 0 cm^3 04/18/25 0929   Treatments Site care;Cleansed 04/18/25 0929   Dressing Open to air 04/18/25 0400       Wound 03/24/25 Finger D2, index Left;Posterior (Active)   Wound Image   04/18/25 0920   Wound Description Dusky;Eschar;Fragile 04/18/25 0920   Non-staged Wound Description Not applicable 04/18/25 0920   Wound Length (cm) 0 cm " 04/18/25 0920   Wound Width (cm) 0 cm 04/18/25 0920   Wound Depth (cm) 0 cm 04/18/25 0920   Wound Surface Area (cm^2) 0 cm^2 04/18/25 0920   Wound Volume (cm^3) 0 cm^3 04/18/25 0920   Calculated Wound Volume (cm^3) 0 cm^3 04/18/25 0920   Lani-wound Assessment Intact;Pink 04/18/25 0920   Treatments Cleansed;Site care 04/18/25 0920   Wound Site Closure None 04/18/25 0920   Wound packed? No 04/18/25 0920   Patient Tolerance Tolerated well 04/18/25 0920       Wound 04/17/25 Knee Anterior;Right (Active)   Wound Image   04/18/25 0922   Wound Description Clean;Beefy red 04/18/25 0922   Non-staged Wound Description Partial thickness 04/18/25 0922   Wound Length (cm) 3 cm 04/18/25 0922   Wound Width (cm) 5.5 cm 04/18/25 0922   Wound Depth (cm) 0.1 cm 04/18/25 0922   Wound Surface Area (cm^2) 16.5 cm^2 04/18/25 0922   Wound Volume (cm^3) 1.65 cm^3 04/18/25 0922   Calculated Wound Volume (cm^3) 1.65 cm^3 04/18/25 0922   Drainage Amount Small 04/18/25 0922   Drainage Description Sanguineous 04/18/25 0922   Lani-wound Assessment Clean;Dry;Intact 04/18/25 0922   Treatments Cleansed;Irrigation with NSS 04/18/25 0922   Wound Site Closure Open to air 04/18/25 0922   Dressing Foam, Silicon (eg. Allevyn, etc);Xeroform 04/18/25 0922   Wound packed? No 04/18/25 0922   Dressing Changed New 04/18/25 0922   Patient Tolerance Tolerated well 04/18/25 0922   Dressing Status Clean;Dry;Intact 04/18/25 0922       Wound 04/17/25 Hand Posterior;Right (Active)   Wound Image   04/18/25 0925   Wound Description Drainage;Fragile;Pink;Pale 04/18/25 0925   Non-staged Wound Description Partial thickness 04/18/25 0925   Wound Length (cm) 12.5 cm 04/18/25 0925   Wound Width (cm) 0.5 cm 04/18/25 0925   Wound Depth (cm) 0.2 cm 04/18/25 0925   Wound Surface Area (cm^2) 6.25 cm^2 04/18/25 0925   Wound Volume (cm^3) 1.25 cm^3 04/18/25 0925   Calculated Wound Volume (cm^3) 1.25 cm^3 04/18/25 0925   Lani-wound Assessment Fragile;Pink 04/18/25 0925   Treatments  Cleansed;Irrigation with NSS 04/18/25 0925   Wound Site Closure Open to air 04/18/25 0925   Dressing ABD;Dry dressing;Xeroform 04/18/25 0925   Wound packed? No 04/18/25 0925   Dressing Changed New 04/18/25 0925   Patient Tolerance Tolerated well 04/18/25 0925   Dressing Status Clean;Dry;Intact 04/18/25 0925       Wound 04/17/25 Toe D1, great Anterior;Right (Active)   Wound Image   04/18/25 0916   Wound Description Fragile;Pink;Tan 04/18/25 0916   Non-staged Wound Description Partial thickness 04/18/25 0916   Wound Length (cm) 0.7 cm 04/18/25 0916   Wound Width (cm) 1 cm 04/18/25 0916   Wound Depth (cm) 0 cm 04/18/25 0916   Wound Surface Area (cm^2) 0.7 cm^2 04/18/25 0916   Wound Volume (cm^3) 0 cm^3 04/18/25 0916   Calculated Wound Volume (cm^3) 0 cm^3 04/18/25 0916   Drainage Amount None 04/18/25 0916   Lani-wound Assessment Fragile 04/18/25 0916   Treatments Cleansed;Site care 04/18/25 0916   Wound Site Closure None 04/18/25 0916   Dressing Dry dressing 04/18/25 0916   Wound packed? No 04/18/25 0916   Dressing Changed New 04/18/25 0916   Patient Tolerance Tolerated well 04/18/25 0916   Dressing Status Clean;Dry;Intact 04/18/25 0916       Wound 04/17/25 Arm Left;Posterior;Inferior (Active)   Wound Image   04/18/25 0918   Wound Description Bleeding;Beefy red 04/18/25 0918   Non-staged Wound Description Partial thickness 04/18/25 0918   Wound Length (cm) 1.5 cm 04/18/25 0918   Wound Width (cm) 1.5 cm 04/18/25 0918   Wound Depth (cm) 0.1 cm 04/18/25 0918   Wound Surface Area (cm^2) 2.25 cm^2 04/18/25 0918   Wound Volume (cm^3) 0.225 cm^3 04/18/25 0918   Calculated Wound Volume (cm^3) 0.23 cm^3 04/18/25 0918   Drainage Amount Scant 04/18/25 0918   Drainage Description Sanguineous 04/18/25 0918   Lani-wound Assessment Clean;Dry;Fragile 04/18/25 0918   Treatments Cleansed;Irrigation with NSS 04/18/25 0918   Wound Site Closure Open to air 04/18/25 0918   Dressing Foam, Silicon (eg. Allevyn, etc);Xeroform 04/18/25 0918    Wound packed? No 04/18/25 0918   Patient Tolerance Tolerated well 04/18/25 0918   Dressing Status Clean;Dry;Intact 04/18/25 0918     Leigh Nicholson BSN, RN, CWCN

## 2025-04-18 NOTE — ASSESSMENT & PLAN NOTE
Per wife patient was waiting in the car while she attended a doctor's appointment.  Patient exit the car without his walker and subsequently fell.  Denies LOC or preceding symptoms   Found hypotensive  Trauma scans without fracture  CTH negative  CT ab/pel: Small subcutaneous hematoma of right hip without active bleed.  Otherwise no acute traumatic injury.  Cirrhosis with portal hypertension and small volume ascites.  There was initial concern for    Plan   Fall precautions  PT/OT

## 2025-04-18 NOTE — ASSESSMENT & PLAN NOTE
On Levophed briefly for hypotension  Viral panel blood cultures negative.  Initial procalcitonin negative  Small subcutaneous hematoma right hip without evidence of active bleeding.  No acute traumatic injury of the abdomen pelvis otherwise noted. 2 U  PRBC given initially due to concern for possible bleed

## 2025-04-18 NOTE — QUICK NOTE
Critical Care Interval Transfer Note:    Brief Hospital Summary: Patient is a 65 y.o. male w/ PMHx CVA, CAD s/p multiple stents, HFrEF 35%, ESRD on HD MWF, cirrhosis, upper extremity DVT on Eliquis who presents after a fall exiting the car. Per report he was noted to have profound hypotension 60/40 with Initial concern for hemorrhagic shock as bedside ultrasound showed abdominal fluid. Patient was sent for stat CT but became hypoxic when lying flat and was intubated. Patient was transfused 2 units of whole blood. Trauma scans unremarkable only noting R hip hematoma (no active bleed), pulmonary edema with vascular congestion, and cirrhosis with ascites. Infectious workup only notable for mild elevated Pro-Mati 0.46. Monitoring patient off antibiotics. Patient does have a new EF of 20% and cardiology has been consulted. Nephrology on board for HD today. Patient has been extubated and off pressors since yesterday. Currently on room air.       Barriers to discharge:   PT/OT eval  Pending blood and sputum cx  Needs LifeVest   Spoke with cardiology who will reorder it         Consults: IP CONSULT TO CASE MANAGEMENT  IP CONSULT TO NEPHROLOGY  IP CONSULT TO CARDIOLOGY    Recommended to review admission imaging for incidental findings and document in discharge navigator: Chart reviewed, no known incidental findings noted at this time.      Discharge Plan: Anticipate discharge in 48 hrs to TBD, may need rehab       Patient seen and evaluated by Critical Care today and deemed to be appropriate for transfer to [facility] with Telemetry. Spoke to Dr. Hernandez from [facility] to accept transfer. Critical care can be contacted via SecureChat with any questions or concerns. Please use the Critical Care AP Role in Secure Chat for any provider inquires until the patient is transferred out of the ICU or until tomorrow at 0600.

## 2025-04-18 NOTE — ASSESSMENT & PLAN NOTE
Lab Results   Component Value Date    EGFR 10 04/18/2025    EGFR 12 04/17/2025    EGFR 8 03/24/2025    CREATININE 5.30 (H) 04/18/2025    CREATININE 4.62 (H) 04/17/2025    CREATININE 6.44 (H) 03/24/2025     MWF schedule at [facility]  Nephrology consulted appreciate recommendations

## 2025-04-18 NOTE — ASSESSMENT & PLAN NOTE
Lab Results   Component Value Date    HGB 10.9 (L) 04/18/2025    HGB 10.2 (L) 04/17/2025    HGB 9.9 (L) 04/17/2025     Multifactorial etiology-ESRD, chronic disease  On admission Hb 9.5, s/p 2 unit whole blood for suspected hemorrhagic shock  F/u Folate, B12 given macrocytosis

## 2025-04-18 NOTE — ASSESSMENT & PLAN NOTE
EF 35 to 40% with grade 2 diastolic dysfunction with moderate to severe aortic stenoses, mild to moderate MR, mild to moderate TR  Patient with known CAD with recent stent placement at [facility] in preparation for TAVR  Continue UF with HD as tolerated.  Plan UF 1L today  Management per primary team

## 2025-04-18 NOTE — ASSESSMENT & PLAN NOTE
Lab Results   Component Value Date    EGFR 10 04/18/2025    EGFR 12 04/17/2025    EGFR 8 03/24/2025    CREATININE 5.30 (H) 04/18/2025    CREATININE 4.62 (H) 04/17/2025    CREATININE 6.44 (H) 03/24/2025

## 2025-04-18 NOTE — PLAN OF CARE
Problem: SAFETY,RESTRAINT: NV/NON-SELF DESTRUCTIVE BEHAVIOR  Goal: Remains free of harm/injury (restraint for non violent/non self-detsructive behavior)  Description: INTERVENTIONS:- Instruct patient/family regarding restraint use - Assess and monitor physiologic and psychological status - Provide interventions and comfort measures to meet assessed patient needs - Identify and implement measures to help patient regain control- Assess readiness for release of restraint   Outcome: Progressing  Goal: Returns to optimal restraint-free functioning  Description: INTERVENTIONS:- Assess the patient's behavior and symptoms that indicate continued need for restraint- Identify and implement measures to help patient regain control- Assess readiness for release of restraint   Outcome: Progressing     Problem: Prexisting or High Potential for Compromised Skin Integrity  Goal: Skin integrity is maintained or improved  Description: INTERVENTIONS:- Identify patients at risk for skin breakdown- Assess and monitor skin integrity- Assess and monitor nutrition and hydration status- Monitor labs - Assess for incontinence - Turn and reposition patient- Assist with mobility/ambulation- Relieve pressure over bony prominences- Avoid friction and shearing- Provide appropriate hygiene as needed including keeping skin clean and dry- Evaluate need for skin moisturizer/barrier cream- Collaborate with interdisciplinary team - Patient/family teaching- Consider wound care consult   Outcome: Progressing     Problem: PAIN - ADULT  Goal: Verbalizes/displays adequate comfort level or baseline comfort level  Description: Interventions:- Encourage patient to monitor pain and request assistance- Assess pain using appropriate pain scale- Administer analgesics based on type and severity of pain and evaluate response- Implement non-pharmacological measures as appropriate and evaluate response- Consider cultural and social influences on pain and pain  management- Notify physician/advanced practitioner if interventions unsuccessful or patient reports new pain  Outcome: Progressing     Problem: INFECTION - ADULT  Goal: Absence or prevention of progression during hospitalization  Description: INTERVENTIONS:- Assess and monitor for signs and symptoms of infection- Monitor lab/diagnostic results- Monitor all insertion sites, i.e. indwelling lines, tubes, and drains- Monitor endotracheal if appropriate and nasal secretions for changes in amount and color- [place] appropriate cooling/warming therapies per order- Administer medications as ordered- Instruct and encourage patient and family to use good hand hygiene technique- Identify and instruct in appropriate isolation precautions for identified infection/condition  Outcome: Progressing  Goal: Absence of fever/infection during neutropenic period  Description: INTERVENTIONS:- Monitor WBC  Outcome: Progressing     Problem: SAFETY ADULT  Goal: Patient will remain free of falls  Description: INTERVENTIONS:- Educate patient/family on patient safety including physical limitations- Instruct patient to call for assistance with activity - Consult OT/PT to assist with strengthening/mobility - Keep Call bell within reach- Keep bed low and locked with side rails adjusted as appropriate- Keep care items and personal belongings within reach- Initiate and maintain comfort rounds- Make Fall Risk Sign visible to staff- Offer Toileting every  Hours, in advance of need- Initiate/Maintain alarm- Obtain necessary fall risk management equipment: - Apply yellow socks and bracelet for high fall risk patients- Consider moving patient to room near nurses station  Outcome: Progressing  Goal: Maintain or return to baseline ADL function  Description: INTERVENTIONS:-  Assess patient's ability to carry out ADLs; assess patient's baseline for ADL function and identify physical deficits which impact ability to perform ADLs (bathing, care of  mouth/teeth, toileting, grooming, dressing, etc.)- Assess/evaluate cause of self-care deficits - Assess range of motion- Assess patient's mobility; develop plan if impaired- Assess patient's need for assistive devices and provide as appropriate- Encourage maximum independence but intervene and supervise when necessary- Involve family in performance of ADLs- Assess for home care needs following discharge - Consider OT consult to assist with ADL evaluation and planning for discharge- Provide patient education as appropriate  Outcome: Progressing  Goal: Maintains/Returns to pre admission functional level  Description: INTERVENTIONS:- Perform AM-PAC 6 Click Basic Mobility/ Daily Activity assessment daily.- Set and communicate daily mobility goal to care team and patient/family/caregiver. - Collaborate with rehabilitation services on mobility goals if consulted- Perform Range of Motion  times a day.- Reposition patient every  hours.- Dangle patient  times a day- Stand patient times a day- Ambulate patient  times a day- Out of bed to chair  times a day - Out of bed for meals times a day- Out of bed for toileting- Record patient progress and toleration of activity level   Outcome: Progressing     Problem: DISCHARGE PLANNING  Goal: Discharge to home or other facility with appropriate resources  Description: INTERVENTIONS:- Identify barriers to discharge w/patient and caregiver- Arrange for needed discharge resources and transportation as appropriate- Identify discharge learning needs (meds, wound care, etc.)- Arrange for interpretive services to assist at discharge as needed- Refer to Case Management Department for coordinating discharge planning if the patient needs post-hospital services based on physician/advanced practitioner order or complex needs related to functional status, cognitive ability, or social support system  Outcome: Progressing     Problem: Knowledge Deficit  Goal: Patient/family/caregiver demonstrates  understanding of disease process, treatment plan, medications, and discharge instructions  Description: Complete learning assessment and assess knowledge base.Interventions:- Provide teaching at level of understanding- Provide teaching via preferred learning methods  Outcome: Progressing

## 2025-04-19 LAB
ANION GAP SERPL CALCULATED.3IONS-SCNC: 12 MMOL/L (ref 4–13)
BUN SERPL-MCNC: 36 MG/DL (ref 5–25)
CALCIUM SERPL-MCNC: 8 MG/DL (ref 8.4–10.2)
CHLORIDE SERPL-SCNC: 96 MMOL/L (ref 96–108)
CO2 SERPL-SCNC: 27 MMOL/L (ref 21–32)
CREAT SERPL-MCNC: 4.92 MG/DL (ref 0.6–1.3)
ERYTHROCYTE [DISTWIDTH] IN BLOOD BY AUTOMATED COUNT: 18.2 % (ref 11.6–15.1)
GFR SERPL CREATININE-BSD FRML MDRD: 11 ML/MIN/1.73SQ M
GLUCOSE SERPL-MCNC: 115 MG/DL (ref 65–140)
GLUCOSE SERPL-MCNC: 116 MG/DL (ref 65–140)
GLUCOSE SERPL-MCNC: 57 MG/DL (ref 65–140)
GLUCOSE SERPL-MCNC: 72 MG/DL (ref 65–140)
GLUCOSE SERPL-MCNC: 82 MG/DL (ref 65–140)
HCT VFR BLD AUTO: 37.2 % (ref 36.5–49.3)
HGB BLD-MCNC: 11.6 G/DL (ref 12–17)
MAGNESIUM SERPL-MCNC: 2 MG/DL (ref 1.9–2.7)
MCH RBC QN AUTO: 31.8 PG (ref 26.8–34.3)
MCHC RBC AUTO-ENTMCNC: 31.2 G/DL (ref 31.4–37.4)
MCV RBC AUTO: 102 FL (ref 82–98)
PHOSPHATE SERPL-MCNC: 5.6 MG/DL (ref 2.3–4.1)
PLATELET # BLD AUTO: 122 THOUSANDS/UL (ref 149–390)
PMV BLD AUTO: 11.6 FL (ref 8.9–12.7)
POTASSIUM SERPL-SCNC: 4.6 MMOL/L (ref 3.5–5.3)
RBC # BLD AUTO: 3.65 MILLION/UL (ref 3.88–5.62)
SODIUM SERPL-SCNC: 135 MMOL/L (ref 135–147)
WBC # BLD AUTO: 6.7 THOUSAND/UL (ref 4.31–10.16)

## 2025-04-19 PROCEDURE — 80048 BASIC METABOLIC PNL TOTAL CA: CPT

## 2025-04-19 PROCEDURE — 82948 REAGENT STRIP/BLOOD GLUCOSE: CPT

## 2025-04-19 PROCEDURE — 83735 ASSAY OF MAGNESIUM: CPT

## 2025-04-19 PROCEDURE — 99232 SBSQ HOSP IP/OBS MODERATE 35: CPT | Performed by: INTERNAL MEDICINE

## 2025-04-19 PROCEDURE — 85027 COMPLETE CBC AUTOMATED: CPT

## 2025-04-19 PROCEDURE — 84100 ASSAY OF PHOSPHORUS: CPT

## 2025-04-19 RX ORDER — ONDANSETRON 2 MG/ML
4 INJECTION INTRAMUSCULAR; INTRAVENOUS EVERY 6 HOURS PRN
Status: COMPLETED | OUTPATIENT
Start: 2025-04-19 | End: 2025-04-19

## 2025-04-19 RX ADMIN — APIXABAN 2.5 MG: 2.5 TABLET, FILM COATED ORAL at 17:36

## 2025-04-19 RX ADMIN — Medication 500 MG: at 21:51

## 2025-04-19 RX ADMIN — ATORVASTATIN CALCIUM 40 MG: 40 TABLET, FILM COATED ORAL at 17:36

## 2025-04-19 RX ADMIN — ONDANSETRON 4 MG: 2 INJECTION INTRAMUSCULAR; INTRAVENOUS at 19:12

## 2025-04-19 RX ADMIN — CHLORHEXIDINE GLUCONATE 15 ML: 1.2 SOLUTION ORAL at 21:54

## 2025-04-19 RX ADMIN — CYANOCOBALAMIN TAB 500 MCG 1000 MCG: 500 TAB at 09:14

## 2025-04-19 RX ADMIN — LIDOCAINE 5% 1 PATCH: 700 PATCH TOPICAL at 09:14

## 2025-04-19 RX ADMIN — ONDANSETRON 4 MG: 2 INJECTION INTRAMUSCULAR; INTRAVENOUS at 07:40

## 2025-04-19 RX ADMIN — PRASUGREL 10 MG: 10 TABLET, FILM COATED ORAL at 09:15

## 2025-04-19 RX ADMIN — APIXABAN 2.5 MG: 2.5 TABLET, FILM COATED ORAL at 09:14

## 2025-04-19 RX ADMIN — CHLORHEXIDINE GLUCONATE 15 ML: 1.2 SOLUTION ORAL at 09:14

## 2025-04-19 NOTE — ASSESSMENT & PLAN NOTE
Lab Results   Component Value Date    EGFR 11 04/19/2025    EGFR 13 04/18/2025    EGFR 10 04/18/2025    CREATININE 4.92 (H) 04/19/2025    CREATININE 4.36 (H) 04/18/2025    CREATININE 5.30 (H) 04/18/2025     MWF schedule at [facility]  Nephrology consulted appreciate recommendations

## 2025-04-19 NOTE — PROGRESS NOTES
Progress Note - Hospitalist   Name: Asad Galloway 65 y.o. male I MRN: [id number]  Unit/Bed#: S -01 I Date of Admission: 4/17/2025   Date of Service: 4/19/2025 I Hospital Day: 2    Assessment & Plan  Fall  Per wife patient was waiting in the car while she attended a doctor's appointment.  Patient exit the car without his walker and subsequently fell.  Denies LOC or preceding symptoms   Found hypotensive  Trauma scans without fracture  CTH negative  CT ab/pel: Small subcutaneous hematoma of right hip without active bleed.  Otherwise no acute traumatic injury.  Cirrhosis with portal hypertension and small volume ascites.     Plan   Fall precautions  PT/OT  Mixed hyperlipidemia  Continue home Lipitor 40   Anemia  Recent Labs     04/18/25  0446 04/18/25 2047 04/19/25  0627   HGB 10.9* 11.3* 11.6*     Multifactorial etiology-ESRD, chronic disease  On admission Hb 9.5, s/p 2 unit whole blood for suspected hemorrhagic shock  Folate wnl  B12 384, will start vitamin B12 1000 mcg daily   ESRD on dialysis (formerly [facility])  Lab Results   Component Value Date    EGFR 11 04/19/2025    EGFR 13 04/18/2025    EGFR 10 04/18/2025    CREATININE 4.92 (H) 04/19/2025    CREATININE 4.36 (H) 04/18/2025    CREATININE 5.30 (H) 04/18/2025     MWF schedule at [facility]  Nephrology consulted appreciate recommendations  CAD, multiple vessel  CAD S/P multiple stents with history over the past years, most recently in 2025 2/27/2025 [facility]: Mid circumflex 10%, first obtuse marginal 100%, first %, severe in-stent restenosis of LAD and  of OM1 ([facility])  3/17/2025 [facility] LM normal, proximal LAD 90%, circumflex luminal irregularities, first obtuse marginal 100%, right system not described, successful balloon angioplasty to proximal LAD with 0% residual stenosis ([facility])  He is on ASA, effient, statin  Resume home meds as able  Acute hypoxic respiratory failure (HCC)  Patient was noted to be hypoxic at the CT when laid  flat and reportedly hypotensive and thus was intubated in the ED.  Reportedly has been coughing for ~1- 2 weeks per wife  COVID flu RSV negative  WBC normal  procal 0.41  Urine antigens not obtained as patient is anuric  CT chest: No large PE, Cardiomegaly with small right pleural effusion and interstitial edema. Cirrhosis with ascites.  CXR: Mild pulmonary vascular congestion with trace pleural effusions     Plan  Monitoring off ABX  Follow up MRSA  Obtain sputum culture  Volume management with dialysis  Ischemic cardiomyopathy  Echo March 2025 LVEF 35-40%, global hypokinesis with regional variations, moderately reduced RV systolic function, severe left atrial dilation moderate to severely calcified aortic valve with moderate to severe aortic stenosis   Aortic stenosis  Severe AS, scheduled for TAVR in April 2025 at [facility]   Acute embolism and thrombosis of right peroneal vein (HCC)  Hx of upper extremity DVT in right upper extremity of eliquis 2.5 bid  Eliquis 2.5mg BID  Type 2 diabetes mellitus (HCC)  Lab Results   Component Value Date    HGBA1C 5.4 04/10/2025       Recent Labs     04/18/25  1832 04/18/25  1951 04/18/25  2142 04/19/25  0801   POCGLU 77 89 84 72       Blood Sugar Average: Last 72 hrs:  (P) 81.6    Controlled  Will continue SSI  Hypoglycemia protocol  Chronic kidney disease-mineral and bone disorder  Lab Results   Component Value Date    EGFR 11 04/19/2025    EGFR 13 04/18/2025    EGFR 10 04/18/2025    CREATININE 4.92 (H) 04/19/2025    CREATININE 4.36 (H) 04/18/2025    CREATININE 5.30 (H) 04/18/2025     Calcium and magnesium stable  hx hyperphosphatemia.  Binders recently discontinued during recent hospitalization at [place].  Continue low phosphorus diet  Secondary hyperparathyroidism of renal origin: On Hectorol 2 mcg 3 times a week with HD and Sensipar 90 mg 3 times a week with HD  Continue to monitor and manage as outpatient  HFrEF (heart failure with reduced ejection fraction) ([facility])  Wt  Readings from Last 3 Encounters:   04/19/25 101 kg (221 lb 12.5 oz)   04/09/25 100 kg (221 lb)   04/01/25 97.1 kg (214 lb)     Combined systolic and diastolic HF, [facility]  Last echo March 2025- Ef 35-40% with grade III DD, global hypokinesis, severely LA enlargement  Medications;Toprol-XL 25 twice daily, Entresto 24-26 twice daily with Volume management: Dialysis  For this admission volume up with concern for cardiogenic shock     BNP >4700  Trop 130/ 117/107  CXR Mild pulmonary venous congestion with trace pleural effusions.   4/17 echo EF 20%, severe global hypokinesis, ungraded diastolic dysfunction, severe RV dilation moderate AAS, moderate MR moderate TR  Plan   Volume per Dialysis  Home home medications as able  Cardiology consulted for new reduction in EF, recommendations appreciated  Will need lifevest at discharge        Cirrhosis (HCC)  Known hx cirrhosis. Re-demonstrated on CTA chest/PE 4/17     VTE Pharmacologic Prophylaxis:   High Risk (Score >/= 5) - Pharmacological DVT Prophylaxis Ordered: apixaban (Eliquis). Sequential Compression Devices Ordered.    Mobility:   Basic Mobility Inpatient Raw Score: 11  JH-HLM Goal: 4: Move to chair/commode  JH-HLM Achieved: 4: Move to chair/commode  JH-HLM Goal achieved. Continue to encourage appropriate mobility.    Patient Centered Rounds: I performed bedside rounds with nursing staff today.   Discussions with Specialists or Other Care Team Provider: Cardiology, nephrology     Education and Discussions with Family / Patient: Updated  (wife) at bedside.    Current Length of Stay: 2 day(s)  Current Patient Status: Inpatient   Certification Statement: The patient will continue to require additional inpatient hospital stay due to PT eval, needs lifevest prior to dc   Discharge Plan: Anticipate discharge in 24-48 hrs to rehab facility.    Code Status: Level 1 - Full Code    Subjective   Patient was seen and examined at bedside this am with wife at bedside.  Patient reports continued fatigue. Also having some phlegm that he's coughing up with sore throat that they think is likely from being intubated in the ICU.     Objective :  Temp:  [98 °F (36.7 °C)-98.5 °F (36.9 °C)] 98.1 °F (36.7 °C)  HR:  [77-91] 85  BP: ()/(37-66) 111/54  Resp:  [20-31] 20  SpO2:  [94 %-100 %] 94 %  O2 Device: Nasal cannula  Nasal Cannula O2 Flow Rate (L/min):  [2 L/min] 2 L/min    Body mass index is 32.75 kg/m².     Input and Output Summary (last 24 hours):     Intake/Output Summary (Last 24 hours) at 4/19/2025 0905  Last data filed at 4/18/2025 1646  Gross per 24 hour   Intake 500 ml   Output 1000 ml   Net -500 ml       Physical Exam  Vitals reviewed.   Constitutional:       Comments: fatigued   HENT:      Head: Normocephalic and atraumatic.      Right Ear: External ear normal.      Left Ear: External ear normal.      Nose: Nose normal.      Mouth/Throat:      Pharynx: Oropharynx is clear.      Comments: Phlegm production  Eyes:      Conjunctiva/sclera: Conjunctivae normal.      Pupils: Pupils are equal, round, and reactive to light.   Cardiovascular:      Rate and Rhythm: Normal rate and regular rhythm.      Pulses: Normal pulses.      Heart sounds: Normal heart sounds.   Pulmonary:      Effort: Pulmonary effort is normal.      Breath sounds: Normal breath sounds.   Abdominal:      General: Abdomen is flat.      Palpations: Abdomen is soft.      Tenderness: There is no abdominal tenderness.   Musculoskeletal:      Cervical back: Neck supple.      Right lower leg: No edema.      Left lower leg: No edema.   Skin:     General: Skin is warm.   Neurological:      Mental Status: He is alert and oriented to person, place, and time. Mental status is at baseline.   Psychiatric:         Mood and Affect: Mood normal.         Behavior: Behavior normal.         Lines/Drains:        Telemetry:  Telemetry Orders (From admission, onward)               24 Hour Telemetry Monitoring  Continuous x 24 Hours  (Telem)        Comments: Do not discontinue.  Need to resume LifeVest on discharge.   Expiring   Question:  Reason for 24 Hour Telemetry  Answer:  Arrhythmias requiring acute medical intervention / PPM or ICD malfunction                     Telemetry Reviewed:  bradycardia  Indication for Continued Telemetry Use: Arrthymias requiring medical therapy               Lab Results: I have reviewed the following results:   Results from last 7 days   Lab Units 04/19/25 0627 04/18/25 2047 04/18/25 0446   WBC Thousand/uL 6.70   < > 4.65   HEMOGLOBIN g/dL 11.6*   < > 10.9*   HEMATOCRIT % 37.2   < > 34.7*   PLATELETS Thousands/uL 122*   < > 106*   SEGS PCT %  --   --  85*   LYMPHO PCT %  --   --  8*   MONO PCT %  --   --  6   EOS PCT %  --   --  0    < > = values in this interval not displayed.     Results from last 7 days   Lab Units 04/19/25 0627 04/18/25 2047 04/18/25 0446   SODIUM mmol/L 135   < > 135   POTASSIUM mmol/L 4.6   < > 5.2   CHLORIDE mmol/L 96   < > 95*   CO2 mmol/L 27   < > 27   BUN mg/dL 36*   < > 33*   CREATININE mg/dL 4.92*   < > 5.30*   ANION GAP mmol/L 12   < > 13   CALCIUM mg/dL 8.0*   < > 7.8*   ALBUMIN g/dL  --   --  3.7   TOTAL BILIRUBIN mg/dL  --   --  0.97   ALK PHOS U/L  --   --  130*   ALT U/L  --   --  9   AST U/L  --   --  13   GLUCOSE RANDOM mg/dL 57*   < > 92    < > = values in this interval not displayed.     Results from last 7 days   Lab Units 04/18/25  0446   INR  1.35*     Results from last 7 days   Lab Units 04/19/25  0801 04/18/25  2142 04/18/25  1951 04/18/25  1832 04/18/25  1753   POC GLUCOSE mg/dl 72 84 89 77 86         Results from last 7 days   Lab Units 04/18/25  0446 04/17/25  1347   LACTIC ACID mmol/L  --  1.4   PROCALCITONIN ng/ml 0.47*  --        Recent Cultures (last 7 days):   Results from last 7 days   Lab Units 04/17/25  1641   BLOOD CULTURE  No Growth at 24 hrs.  No Growth at 24 hrs.       Imaging Results Review: I reviewed radiology reports from this admission  including: CTA chest pe, CT spine cervical, CT facial bones, chest xray, CT abdomen/pelvis, CT head, and xray(s).  Other Study Results Review: No additional pertinent studies reviewed.    Last 24 Hours Medication List:     Current Facility-Administered Medications:     acetaminophen (Ofirmev) IVPB 500 mg, Q8H PRN, Last Rate: 500 mg (04/18/25 0143)    apixaban (ELIQUIS) tablet 2.5 mg, BID    atorvastatin (LIPITOR) tablet 40 mg, Daily With Dinner    chlorhexidine (PERIDEX) 0.12 % oral rinse 15 mL, Q12H MICH    [Held by provider] Cholecalciferol (VITAMIN D3) tablet 1,000 Units, Daily    [Held by provider] cinacalcet (SENSIPAR) tablet 30 mg, Daily With Breakfast    cyanocobalamin (VITAMIN B-12) tablet 1,000 mcg, Daily    [Transfer Hold] guaiFENesin (ROBITUSSIN) oral liquid 200 mg, Q4H PRN    insulin lispro (HumALOG/ADMELOG) 100 units/mL subcutaneous injection 1-6 Units, 4x Daily (PC & HS) **AND** Fingerstick Glucose (POCT), 4x Daily AC and at bedtime    levalbuterol (XOPENEX) inhalation solution 0.63 mg, Q6H PRN    lidocaine (LIDODERM) 5 % patch 1 patch, Daily    [Held by provider] melatonin tablet 3 mg, HS    [Held by provider] metoprolol succinate (TOPROL-XL) 24 hr tablet 25 mg, BID    midodrine (PROAMATINE) tablet 10 mg, Before Dialysis    ondansetron (ZOFRAN) injection 4 mg, Q6H PRN    prasugrel (EFFIENT) tablet 10 mg, Daily    [Held by provider] sacubitril-valsartan (ENTRESTO) 24-26 MG per tablet 1 tablet, BID    Administrative Statements   Today, Patient Was Seen By: Aniyah Guerrero DO    **Please Note: This note may have been constructed using a voice recognition system.**

## 2025-04-19 NOTE — PROGRESS NOTES
"Cardiology Progress Note - Asad Galloway 65 y.o. male MRN: [id number]    Unit/Bed#: S -01 Encounter: [id number]    Assessment and plan  #1 fall likely due to low blood pressure  #2 multivessel CAD with recent intervention at [facility]  #3 end-stage renal disease on dialysis  #4 acute hypoxemic respiratory failure  #5 ischemic cardiomyopathy ejection fraction my review of 30 to 35%  #6 moderate aortic stenosis and insufficiency  #7 mitral regurgitation  #8 thrombosis of right peroneal vein on Eliquis  #9 anemia  #10 chronic heart failure with reduced ejection fraction      Recommendations: I suspect initial fall was due to hypotension.  Blood pressures have been improving.  He received 2 units of blood which likely caused some flash pulmonary edema.  Volume management on dialysis.  Start to add back cardiac medications as blood pressure will allow.  No further testing planned from my standpoint.  Will need LifeVest again at discharge.      Subjective:    No significant events overnight.  Blood pressures are better this morning.  Spoke to nephrology will do short dialysis runs.  Denies any chest pain or dyspnea.  No lightheadedness.    ROS    Objective:   Vitals: Blood pressure 111/54, pulse 85, temperature 98.1 °F (36.7 °C), temperature source Oral, resp. rate 20, height 5' 9\" (1.753 m), weight 101 kg (221 lb 12.5 oz), SpO2 94%., Body mass index is 32.75 kg/m².,   Orthostatic Blood Pressures      Flowsheet Row Most Recent Value   Blood Pressure 111/54 filed at 2025 0730   Patient Position - Orthostatic VS Lying filed at 2025 1630           Systolic (24hrs), Av , Min:82 , Max:123     Diastolic (24hrs), Av, Min:37, Max:66      Intake/Output Summary (Last 24 hours) at 2025 0811  Last data filed at 2025 1646  Gross per 24 hour   Intake 500 ml   Output 1000 ml   Net -500 ml     Weight (last 2 days)       Date/Time Weight    25 0600 101 (221.78)    25 0551 91.3 (201.28)    " 04/18/25 0200 91.3 (201.28)    04/17/25 1619 103 (227)    04/17/25 13:40:21 103 (227.07)              Telemetry Review: No significant arrhythmias seen on telemetry review.   EKG personally reviewed by aRyo Nascimento DO.     Physical Exam  Vitals and nursing note reviewed.   Constitutional:       General: He is not in acute distress.     Appearance: He is well-developed.   HENT:      Head: Normocephalic and atraumatic.   Eyes:      Conjunctiva/sclera: Conjunctivae normal.      Pupils: Pupils are equal, round, and reactive to light.   Cardiovascular:      Rate and Rhythm: Normal rate and regular rhythm.      Pulses: Normal pulses.      Heart sounds: Normal heart sounds. No murmur heard.     No friction rub.   Pulmonary:      Effort: Pulmonary effort is normal. No respiratory distress.      Breath sounds: Normal breath sounds. No wheezing or rales.   Abdominal:      General: Bowel sounds are normal. There is no distension.      Palpations: Abdomen is soft.      Tenderness: There is no abdominal tenderness. There is no rebound.   Musculoskeletal:         General: No tenderness or deformity. Normal range of motion.      Cervical back: Neck supple.      Right lower leg: Edema present.      Left lower leg: Edema present.   Skin:     General: Skin is warm and dry.      Findings: No erythema.   Neurological:      Mental Status: He is alert and oriented to person, place, and time.      Cranial Nerves: No cranial nerve deficit.           Laboratory Results:        CBC with diff:   Results from last 7 days   Lab Units 04/19/25  0627 04/18/25  2047 04/18/25  0446 04/17/25  1440 04/17/25  1419 04/17/25  1348 04/17/25  1347 04/17/25  1020   WBC Thousand/uL 6.70 6.79 4.65  --   --   --  5.07 6.09   HEMOGLOBIN g/dL 11.6* 11.3* 10.9*  --   --   --  9.5* 10.0*   I STAT HEMOGLOBIN g/dl  --   --   --  10.2* 9.9* 10.5*  --   --    HEMATOCRIT % 37.2 36.0* 34.7*  --   --   --  31.3* 33.1*   HEMATOCRIT, ISTAT %  --   --   --  30* 29*  31*  --   --    MCV fL 102* 99* 100*  --   --   --  104* 106*   PLATELETS Thousands/uL 122* 119* 106*  --   --   --  108* 105*   RBC Million/uL 3.65* 3.63* 3.48*  --   --   --  3.01* 3.12*   MCH pg 31.8 31.1 31.3  --   --   --  31.6 32.1   MCHC g/dL 31.2* 31.4 31.4  --   --   --  30.4* 30.2*   RDW % 18.2* 18.3* 18.8*  --   --   --  17.7* 17.6*   MPV fL 11.6 11.1 11.7  --   --   --  11.9 12.5   NRBC AUTO /100 WBCs  --   --  0  --   --   --  0 0         CMP:  Results from last 7 days   Lab Units 04/19/25 0627 04/18/25  2047 04/18/25  0446 04/17/25  1440 04/17/25  1419 04/17/25  1348 04/17/25  1347   POTASSIUM mmol/L 4.6 4.6 5.2  --   --   --  4.3   CHLORIDE mmol/L 96 96 95*  --   --   --  96   CO2 mmol/L 27 28 27  --   --   --  32   CO2, I-STAT mmol/L  --   --   --  21 31 33*  --    BUN mg/dL 36* 28* 33*  --   --   --  25   CREATININE mg/dL 4.92* 4.36* 5.30*  --   --   --  4.62*   GLUCOSE, ISTAT mg/dl  --   --   --  67 79 89  --    CALCIUM mg/dL 8.0* 8.0* 7.8*  --   --   --  8.0*   AST U/L  --   --  13  --   --   --  13   ALT U/L  --   --  9  --   --   --  9   ALK PHOS U/L  --   --  130*  --   --   --  126*   EGFR ml/min/1.73sq m 11 13 10  --   --   --  12         BMP:  Results from last 7 days   Lab Units 04/19/25 0627 04/18/25  2047 04/18/25  0446 04/17/25  1440 04/17/25  1419 04/17/25  1348 04/17/25  1348 04/17/25  1347   POTASSIUM mmol/L 4.6 4.6 5.2  --   --   --   --  4.3   CHLORIDE mmol/L 96 96 95*  --   --   --   --  96   CO2 mmol/L 27 28 27  --   --   --   --  32   CO2, I-STAT mmol/L  --   --   --  21 31  --  33*  --    BUN mg/dL 36* 28* 33*  --   --   --   --  25   CREATININE mg/dL 4.92* 4.36* 5.30*  --   --   --   --  4.62*   GLUCOSE, ISTAT mg/dl  --   --   --  67 79   < > 89  --    CALCIUM mg/dL 8.0* 8.0* 7.8*  --   --   --   --  8.0*    < > = values in this interval not displayed.       BNP:   Recent Labs     04/17/25  1347   BNP >4,700*       Magnesium:   Results from last 7 days   Lab Units  04/19/25  0627 04/18/25 2047 04/18/25  0446   MAGNESIUM mg/dL 2.0 2.0 2.0       Coags:   Results from last 7 days   Lab Units 04/18/25  0446 04/17/25  1347   PTT seconds  --  38*   INR  1.35* 1.50*       TSH:        Hemoglobin A1C       Lipid Profile:       Cardiac testing:   No results found for this or any previous visit.    No results found for this or any previous visit.    No results found for this or any previous visit.    No results found for this or any previous visit.      Meds/Allergies   all current active meds have been reviewed    Medications Prior to Admission:     apixaban (Eliquis) 2.5 mg    aspirin 81 mg chewable tablet    atorvastatin (LIPITOR) 40 mg tablet    cinacalcet (SENSIPAR) 30 mg tablet    doxycycline hyclate (VIBRAMYCIN) 100 mg capsule    melatonin 3 mg    metoprolol succinate (TOPROL-XL) 25 mg 24 hr tablet    midodrine (PROAMATINE) 10 MG tablet    midodrine (PROAMATINE) 2.5 mg tablet    MIDODRINE HCL PO    mupirocin (BACTROBAN) 2 % ointment    prasugrel (EFFIENT) tablet    sacubitril-valsartan (Entresto) 24-26 MG TABS    Sevelamer Carbonate 2.4 g    Vitamin D3 1.25 MG (10572 UT) capsule       Assessment:  Principal Problem:    Fall  Active Problems:    Mixed hyperlipidemia    Anemia    ESRD on dialysis (HCC)    CAD, multiple vessel    Acute hypoxic respiratory failure (HCC)    Ischemic cardiomyopathy    Aortic stenosis    Acute embolism and thrombosis of right peroneal vein (HCC)    Type 2 diabetes mellitus (HCC)    Chronic kidney disease-mineral and bone disorder    HFrEF (heart failure with reduced ejection fraction) ([facility])    Cirrhosis (HCC)            Counseling / Coordination of Care  Total floor / unit time spent today 25 minutes.  Greater than 50% of total time was spent with the patient and / or family counseling and / or coordination of care.  A description of the counseling / coordination of care: .

## 2025-04-19 NOTE — ASSESSMENT & PLAN NOTE
Per wife patient was waiting in the car while she attended a doctor's appointment.  Patient exit the car without his walker and subsequently fell.  Denies LOC or preceding symptoms   Found hypotensive  Trauma scans without fracture  CTH negative  CT ab/pel: Small subcutaneous hematoma of right hip without active bleed.  Otherwise no acute traumatic injury.  Cirrhosis with portal hypertension and small volume ascites.     Plan   Fall precautions  PT/OT

## 2025-04-19 NOTE — ASSESSMENT & PLAN NOTE
-Etiology unclear.  Patient's family states that the patient attempted to get out of his car on his own without his walker and they believe that the fall was mechanical.  The patient had the acute events as outlined above after arrival

## 2025-04-19 NOTE — ASSESSMENT & PLAN NOTE
Wt Readings from Last 3 Encounters:   04/19/25 101 kg (221 lb 12.5 oz)   04/09/25 100 kg (221 lb)   04/01/25 97.1 kg (214 lb)     Combined systolic and diastolic HF, [facility]  Last echo March 2025- Ef 35-40% with grade III DD, global hypokinesis, severely LA enlargement  Medications;Toprol-XL 25 twice daily, Entresto 24-26 twice daily with Volume management: Dialysis  For this admission volume up with concern for cardiogenic shock     BNP >4700  Trop 130/ 117/107  CXR Mild pulmonary venous congestion with trace pleural effusions.   4/17 echo EF 20%, severe global hypokinesis, ungraded diastolic dysfunction, severe RV dilation moderate AAS, moderate MR moderate TR  Plan   Volume per Dialysis  Home home medications as able  Cardiology consulted for new reduction in EF, recommendations appreciated  Will need lifevest at discharge

## 2025-04-19 NOTE — ASSESSMENT & PLAN NOTE
Lab Results   Component Value Date    EGFR 11 04/19/2025    EGFR 13 04/18/2025    EGFR 10 04/18/2025    CREATININE 4.92 (H) 04/19/2025    CREATININE 4.36 (H) 04/18/2025    CREATININE 5.30 (H) 04/18/2025     Calcium and magnesium stable  hx hyperphosphatemia.  Binders recently discontinued during recent hospitalization at [place].  Continue low phosphorus diet  Secondary hyperparathyroidism of renal origin: On Hectorol 2 mcg 3 times a week with HD and Sensipar 90 mg 3 times a week with HD  Continue to monitor and manage as outpatient

## 2025-04-19 NOTE — PROGRESS NOTES
Progress Note - Nephrology   Name: Asad Galloway 65 y.o. male I MRN: [id number]  Unit/Bed#: S -01 I Date of Admission: 4/17/2025   Date of Service: 4/19/2025 I Hospital Day: 2    Assessment & Plan  ESRD on dialysis (HCC)  -Outpatient dialysis [facility]  - Outpatient schedule MWF and patient had dialysis yesterday  - Left upper extremity aneurysmal fistula without impending signs of rupture on exam with good thrill and bruit  - Per patient's wife patient had dialysis yesterday  -4/17-sodium, potassium, bicarbonate are appropriate.  Calcium was 8.  Troponin being trended.  Hemoglobin at 9.5.  Stable.  -4/18-potassium 5.2.  Sodium, bicarbonate appropriate.  Phosphorus 6.3.  Hemoglobin above goal 10.9.  Had hypotension in the evening requiring albumin and hypotension has resolved.  - 4/19-sodium, potassium, bicarbonate, magnesium are appropriate.  Phosphorus stable 5.6.     Plan  - Agree with holding Entresto for now  - Dialysis Monday from renal standpoint  - Continue midodrine with dialysis  - Reviewed case with cardiology attending earlier today and primary team resident  - Will need to clarify outpatient regimen regarding Sensipar but per notes it appears that he was on 90 mg 3 times a week and does not appear to have been on Hectorol.  Will need to call dialysis unit on Monday to confirm  Fall  -Etiology unclear.  Patient's family states that the patient attempted to get out of his car on his own without his walker and they believe that the fall was mechanical.  The patient had the acute events as outlined above after arrival  Anemia   is on Mircera as outpatient.  Currently hemoglobin is above goal.  Initially received 2 units of transfusion due to acute bleed concerns on trauma workup initially    Chronic kidney disease-mineral and bone disorder  -Is on Hectorol 2 mcg 3 times a week with dialysis?  - Sensipar was recently discontinued per prior notes?  - Phosphorus binders were recently held at [place]  [facility].  Trend phosphorus for now.  May need to consider restarting phosphorus binders  - Per review of outpatient dialysis note on 4/16-patient was on Sensipar to 90 mg but there is not mention of Hectorol.  There was concern with compliance with Renvela.    Acute embolism and thrombosis of right peroneal vein (HCC)  -Per primary team  HFrEF (heart failure with reduced ejection fraction) ([facility])  -EF 35 to 40% with grade 2 diastolic dysfunction with moderate to severe aortic stenoses, mild to moderate MR, mild to moderate TR  - Patient with known CAD with recent stent placement at [facility] in preparation for TAVR  - Further plans per cardiology team  Cirrhosis ([facility])  -per primary team  Aortic stenosis  - Scheduled for TAVR at [place] mid April  Acute hypoxic respiratory failure ([facility])  -Initially intubated.   now extubated  - CT scan of the chest-no PE.  Cardiomegaly with small right effusion and interstitial edema.    I have reviewed the nephrology recommendations including dialysis Monday, with primary team resident, and we are in agreement with renal plan including the information outlined above.     Subjective   Brief History of Admission -   65 y.o. male with a past medical history of ESRD on hemodialysis at [facility], CAD with recent stenting for restenosis at [place], hypertension, hyperlipidemia, aortic stenoses awaiting TAVR, PVD, history of DVT, recent admission at [facility] for CTA and cardiac catheterization for TAVR workup reportedly received CRRT during that time, subsequently admitted to [facility] in March 2025 with agitation and encephalopathy who was admitted to [facility] on 4/17 after presenting with fall. A renal consultation is requested today for assistance in the management of ESRD.  Patient was recently discharged on March 24.  Now presenting back to the hospital after a fall.  Patient was with his wife in the car and his wife was at a doctor's  appointment so when the wife went inside for the appointment, patient reportedly got out of the car to go to a nearby restaurant and unfortunately fell.  Was brought to the hospital for evaluation for trauma.     Patient's wife reports that he was in his usual state of health without any issues and it was his wife who needed to go to the doctor today.  But there are notes in the chart from urgent care at 9 AM that patient was there with a cough for 1 week and was felt to have URI conservatively managed.     In the hospital, patient was transported to [facility] as a trauma alert.  Was hypotensive to 60s systolic.  There was possible concern for possible bleed and therefore was sent for urgent CAT scan.  There was small subcutaneous hematoma in the right hip without evidence of active bleeding.  Cirrhosis with portal hypertension.  Patient ultimately required intubation.  Was transferred to the intensive care unit for further evaluation.  Was on Levophed initially.  Levophed was weaned off and blood pressures remained appropriate.  When I saw the patient earlier this evening patient was intubated with plans for possible extubation    Past 24 hours no acute issues with the exception of intermittent hypotension last night but blood pressures have improved and are appropriate 111-123 systolic.  Family at bedside with son and wife.  We reviewed patient's case.  They were appreciative of the discussion and they expressed concern regarding the patient's continued fatigue.  I have reached out to primary team resident to discuss case and they understand.  Patient has no specific complaints.  No fevers.  No nausea or vomiting and no shortness of breath    Objective :  Temp:  [98 °F (36.7 °C)-98.5 °F (36.9 °C)] 98.1 °F (36.7 °C)  HR:  [77-91] 85  BP: ()/(37-66) 111/54  Resp:  [20-31] 20  SpO2:  [94 %-100 %] 94 %  O2 Device: Nasal cannula  Nasal Cannula O2 Flow Rate (L/min):  [2 L/min] 2 L/min    Current Weight:  Weight - Scale: 101 kg (221 lb 12.5 oz)  First Weight: Weight - Scale: 103 kg (227 lb 1.2 oz)  I/O         04/17 0701  04/18 0700 04/18 0701 04/19 0700 04/19 0701 04/20 0700    I.V. (mL/kg) 61 (0.7) 500 (5)     Blood 700      Total Intake(mL/kg) 761 (8.3) 500 (5)     Other  1000     Total Output  1000     Net +761 -500                  Physical Exam  General: NAD, chronically ill-appearing  Skin: no rash  Eyes: anicteric sclera  ENT: Dry mucous membrane  Neck: supple  Chest: CTA b/l, no ronchii, no wheeze, no rubs, no rales  CVS: s1s2, positive murmur, no gallop, no rub  Abdomen: soft, nontender, nl sounds  Extremities: no edema LE b/l, left upper extremity aneurysmal fistula without skin tightening or any bleeding noted, right lower extremity wounds wrapped  : no chávez  Neuro: AAOX3  Psych: normal affect    Medications:    Current Facility-Administered Medications:     acetaminophen (Ofirmev) IVPB 500 mg, 500 mg, Intravenous, Q8H PRN, Jeannie Braswell MD, Last Rate: 200 mL/hr at 04/18/25 0143, 500 mg at 04/18/25 0143    apixaban (ELIQUIS) tablet 2.5 mg, 2.5 mg, Oral, BID, Jeannie Braswell MD, 2.5 mg at 04/19/25 0914    atorvastatin (LIPITOR) tablet 40 mg, 40 mg, Oral, Daily With Dinner, Jeannie Braswell MD, 40 mg at 04/18/25 1728    chlorhexidine (PERIDEX) 0.12 % oral rinse 15 mL, 15 mL, Mouth/Throat, Q12H MICH, Jeannie Braswell MD, 15 mL at 04/19/25 0914    [Held by provider] Cholecalciferol (VITAMIN D3) tablet 1,000 Units, 1,000 Units, Oral, Daily, Jeannie Braswell MD    [Held by provider] cinacalcet (SENSIPAR) tablet 30 mg, 30 mg, Oral, Daily With Breakfast, Jeannie Braswell MD    cyanocobalamin (VITAMIN B-12) tablet 1,000 mcg, 1,000 mcg, Oral, Daily, Aniyah Guerrero DO, 1,000 mcg at 04/19/25 0914    [Transfer Hold] guaiFENesin (ROBITUSSIN) oral liquid 200 mg, 200 mg, Oral, Q4H PRN, BRITTANY Wellintgon, 200 mg at 04/18/25 3552    insulin lispro (HumALOG/ADMELOG) 100 units/mL subcutaneous injection 1-6 Units, 1-6 Units,  Subcutaneous, 4x Daily (PC & HS) **AND** Fingerstick Glucose (POCT), , , 4x Daily AC and at bedtime, Jeannie Braswell MD    levalbuterol (XOPENEX) inhalation solution 0.63 mg, 0.63 mg, Nebulization, Q6H PRN, Jeannie Braswell MD, 0.63 mg at 04/17/25 1758    lidocaine (LIDODERM) 5 % patch 1 patch, 1 patch, Topical, Daily, Jeannie Braswell MD, 1 patch at 04/19/25 0914    [Held by provider] melatonin tablet 3 mg, 3 mg, Oral, HS, Jeannie Braswell MD    [Held by provider] metoprolol succinate (TOPROL-XL) 24 hr tablet 25 mg, 25 mg, Oral, BID, Jeannie Braswell MD    midodrine (PROAMATINE) tablet 10 mg, 10 mg, Oral, Before Dialysis, Jeannie Braswell MD, 10 mg at 04/18/25 1304    ondansetron (ZOFRAN) injection 4 mg, 4 mg, Intravenous, Q6H PRN, Shasta Gonzales PA-C, 4 mg at 04/19/25 0740    prasugrel (EFFIENT) tablet 10 mg, 10 mg, Oral, Daily, Jeannie Braswell MD, 10 mg at 04/19/25 0915    [Held by provider] sacubitril-valsartan (ENTRESTO) 24-26 MG per tablet 1 tablet, 1 tablet, Oral, BID, Jeannie Braswell MD      Lab Results: I have reviewed the following results:  Results from last 7 days   Lab Units 04/19/25  0627 04/18/25  2047 04/18/25  0446 04/17/25  1440 04/17/25  1419 04/17/25  1348 04/17/25  1347 04/17/25  1020   WBC Thousand/uL 6.70 6.79 4.65  --   --   --  5.07 6.09   HEMOGLOBIN g/dL 11.6* 11.3* 10.9*  --   --   --  9.5* 10.0*   I STAT HEMOGLOBIN g/dl  --   --   --  10.2* 9.9* 10.5*  --   --    HEMATOCRIT % 37.2 36.0* 34.7*  --   --   --  31.3* 33.1*   HEMATOCRIT, ISTAT %  --   --   --  30* 29* 31*  --   --    PLATELETS Thousands/uL 122* 119* 106*  --   --   --  108* 105*   POTASSIUM mmol/L 4.6 4.6 5.2  --   --   --  4.3  --    CHLORIDE mmol/L 96 96 95*  --   --   --  96  --    CO2 mmol/L 27 28 27  --   --   --  32  --    CO2, I-STAT mmol/L  --   --   --  21 31 33*  --   --    BUN mg/dL 36* 28* 33*  --   --   --  25  --    CREATININE mg/dL 4.92* 4.36* 5.30*  --   --   --  4.62*  --    CALCIUM mg/dL 8.0* 8.0* 7.8*  --   --   --  8.0*  --   "  MAGNESIUM mg/dL 2.0 2.0 2.0  --   --   --   --   --    PHOSPHORUS mg/dL 5.6* 5.5* 6.3*  --   --   --   --   --    ALBUMIN g/dL  --   --  3.7  --   --   --  3.8  --    GLUCOSE, ISTAT mg/dl  --   --   --  67 79 89  --   --        Administrative Statements     Portions of the record may have been created with voice recognition software. Occasional wrong word or \"sound a like\" substitutions may have occurred due to the inherent limitations of voice recognition software. Read the chart carefully and recognize, using context, where substitutions have occurred.If you have any questions, please contact the dictating provider.  "

## 2025-04-19 NOTE — PLAN OF CARE
Problem: SAFETY,RESTRAINT: NV/NON-SELF DESTRUCTIVE BEHAVIOR  Goal: Remains free of harm/injury (restraint for non violent/non self-detsructive behavior)  Description: INTERVENTIONS:- Instruct patient/family regarding restraint use - Assess and monitor physiologic and psychological status - Provide interventions and comfort measures to meet assessed patient needs - Identify and implement measures to help patient regain control- Assess readiness for release of restraint   Outcome: Progressing  Goal: Returns to optimal restraint-free functioning  Description: INTERVENTIONS:- Assess the patient's behavior and symptoms that indicate continued need for restraint- Identify and implement measures to help patient regain control- Assess readiness for release of restraint   Outcome: Progressing     Problem: Prexisting or High Potential for Compromised Skin Integrity  Goal: Skin integrity is maintained or improved  Description: INTERVENTIONS:- Identify patients at risk for skin breakdown- Assess and monitor skin integrity- Assess and monitor nutrition and hydration status- Monitor labs - Assess for incontinence - Turn and reposition patient- Assist with mobility/ambulation- Relieve pressure over bony prominences- Avoid friction and shearing- Provide appropriate hygiene as needed including keeping skin clean and dry- Evaluate need for skin moisturizer/barrier cream- Collaborate with interdisciplinary team - Patient/family teaching- Consider wound care consult   Outcome: Progressing     Problem: PAIN - ADULT  Goal: Verbalizes/displays adequate comfort level or baseline comfort level  Description: Interventions:- Encourage patient to monitor pain and request assistance- Assess pain using appropriate pain scale- Administer analgesics based on type and severity of pain and evaluate response- Implement non-pharmacological measures as appropriate and evaluate response- Consider cultural and social influences on pain and pain  management- Notify physician/advanced practitioner if interventions unsuccessful or patient reports new pain  Outcome: Progressing     Problem: INFECTION - ADULT  Goal: Absence or prevention of progression during hospitalization  Description: INTERVENTIONS:- Assess and monitor for signs and symptoms of infection- Monitor lab/diagnostic results- Monitor all insertion sites, i.e. indwelling lines, tubes, and drains- Monitor endotracheal if appropriate and nasal secretions for changes in amount and color- [place] appropriate cooling/warming therapies per order- Administer medications as ordered- Instruct and encourage patient and family to use good hand hygiene technique- Identify and instruct in appropriate isolation precautions for identified infection/condition  Outcome: Progressing  Goal: Absence of fever/infection during neutropenic period  Description: INTERVENTIONS:- Monitor WBC  Outcome: Progressing     Problem: SAFETY ADULT  Goal: Patient will remain free of falls  Description: INTERVENTIONS:- Educate patient/family on patient safety including physical limitations- Instruct patient to call for assistance with activity - Consult OT/PT to assist with strengthening/mobility - Keep Call bell within reach- Keep bed low and locked with side rails adjusted as appropriate- Keep care items and personal belongings within reach- Initiate and maintain comfort rounds- Make Fall Risk Sign visible to staff- Offer Toileting every  Hours, in advance of need- Initiate/Maintain alarm- Obtain necessary fall risk management equipment: - Apply yellow socks and bracelet for high fall risk patients- Consider moving patient to room near nurses station  Outcome: Progressing  Goal: Maintain or return to baseline ADL function  Description: INTERVENTIONS:-  Assess patient's ability to carry out ADLs; assess patient's baseline for ADL function and identify physical deficits which impact ability to perform ADLs (bathing, care of  mouth/teeth, toileting, grooming, dressing, etc.)- Assess/evaluate cause of self-care deficits - Assess range of motion- Assess patient's mobility; develop plan if impaired- Assess patient's need for assistive devices and provide as appropriate- Encourage maximum independence but intervene and supervise when necessary- Involve family in performance of ADLs- Assess for home care needs following discharge - Consider OT consult to assist with ADL evaluation and planning for discharge- Provide patient education as appropriate  Outcome: Progressing  Goal: Maintains/Returns to pre admission functional level  Description: INTERVENTIONS:- Perform AM-PAC 6 Click Basic Mobility/ Daily Activity assessment daily.- Set and communicate daily mobility goal to care team and patient/family/caregiver. - Collaborate with rehabilitation services on mobility goals if consulted- Perform Range of Motion  times a day.- Reposition patient every  hours.- Dangle patient  times a day- Stand patient  times a day- Ambulate patient  times a day- Out of bed to chair  times a day - Out of bed for meals  times a day- Out of bed for toileting- Record patient progress and toleration of activity level   Outcome: Progressing     Problem: DISCHARGE PLANNING  Goal: Discharge to home or other facility with appropriate resources  Description: INTERVENTIONS:- Identify barriers to discharge w/patient and caregiver- Arrange for needed discharge resources and transportation as appropriate- Identify discharge learning needs (meds, wound care, etc.)- Arrange for interpretive services to assist at discharge as needed- Refer to Case Management Department for coordinating discharge planning if the patient needs post-hospital services based on physician/advanced practitioner order or complex needs related to functional status, cognitive ability, or social support system  Outcome: Progressing     Problem: Knowledge Deficit  Goal: Patient/family/caregiver demonstrates  understanding of disease process, treatment plan, medications, and discharge instructions  Description: Complete learning assessment and assess knowledge base.Interventions:- Provide teaching at level of understanding- Provide teaching via preferred learning methods  Outcome: Progressing     Problem: METABOLIC, FLUID AND ELECTROLYTES - ADULT  Goal: Electrolytes maintained within normal limits  Description: INTERVENTIONS:- Monitor labs and assess patient for signs and symptoms of electrolyte imbalances- Administer electrolyte replacement as ordered- Monitor response to electrolyte replacements, including repeat lab results as appropriate- Instruct patient on fluid and nutrition as appropriate  Outcome: Progressing  Goal: Fluid balance maintained  Description: INTERVENTIONS:- Monitor labs - Monitor I/O and WT- Instruct patient on fluid and nutrition as appropriate- Assess for signs & symptoms of volume excess or deficit  Outcome: Progressing     Problem: Nutrition/Hydration-ADULT  Goal: Nutrient/Hydration intake appropriate for improving, restoring or maintaining nutritional needs  Description: Monitor and assess patient's nutrition/hydration status for malnutrition. Collaborate with interdisciplinary team and initiate plan and interventions as ordered.  Monitor patient's weight and dietary intake as ordered or per policy. Utilize nutrition screening tool and intervene as necessary. Determine patient's food preferences and provide high-protein, high-caloric foods as appropriate. INTERVENTIONS:- Monitor oral intake, urinary output, labs, and treatment plans- Assess nutrition and hydration status and recommend course of action- Evaluate amount of meals eaten- Assist patient with eating if necessary - Allow adequate time for meals- Recommend/ encourage appropriate diets, oral nutritional supplements, and vitamin/mineral supplements- Order, calculate, and assess calorie counts as needed- Recommend, monitor, and adjust  tube feedings and TPN/PPN based on assessed needs- Assess need for intravenous fluids- Provide specific nutrition/hydration education as appropriate- Include patient/family/caregiver in decisions related to nutrition  Outcome: Progressing

## 2025-04-19 NOTE — QUICK NOTE
Progress Note - SLIM, called to bedside by primary service   Asad Galloway 65 y.o. male MRN: [id number]    Time Called: 2000  Date Called: 04/18/25  Room#: S331  Time Evaluated: 20:35  Person requesting evaluation: Nurse    Called to  by RN to evaluate patient for assessment by Critical Care Service, chart reviewed and patient evaluated. Has been fatigued and had an instance of hypotension measured today, currently 104/55. Dialyzed this morning. Has had a cough with rhonchorous breath sounds.    Continue med/surg at this time; further evaluate with CXR, cbc, cmp, magnesium, vbg. Administer albumin and home midodrine, reassessment.         Triage Assessment:     Continue med surge    If any questions or concerns please call the critical care team via Secure Chat.

## 2025-04-19 NOTE — ASSESSMENT & PLAN NOTE
-EF 35 to 40% with grade 2 diastolic dysfunction with moderate to severe aortic stenoses, mild to moderate MR, mild to moderate TR  - Patient with known CAD with recent stent placement at [facility] in preparation for TAVR  - Further plans per cardiology team

## 2025-04-19 NOTE — ASSESSMENT & PLAN NOTE
-Is on Hectorol 2 mcg 3 times a week with dialysis?  - Sensipar was recently discontinued per prior notes?  - Phosphorus binders were recently held at [facility].  Trend phosphorus for now.  May need to consider restarting phosphorus binders  - Per review of outpatient dialysis note on 4/16-patient was on Sensipar to 90 mg but there is not mention of Hectorol.  There was concern with compliance with Renvela.

## 2025-04-19 NOTE — ASSESSMENT & PLAN NOTE
Recent Labs     04/18/25  0446 04/18/25 2047 04/19/25  0627   HGB 10.9* 11.3* 11.6*     Multifactorial etiology-ESRD, chronic disease  On admission Hb 9.5, s/p 2 unit whole blood for suspected hemorrhagic shock  Folate wnl  B12 384, will start vitamin B12 1000 mcg daily

## 2025-04-19 NOTE — ASSESSMENT & PLAN NOTE
-Initially intubated.   now extubated  - CT scan of the chest-no PE.  Cardiomegaly with small right effusion and interstitial edema.

## 2025-04-19 NOTE — ASSESSMENT & PLAN NOTE
Lab Results   Component Value Date    HGBA1C 5.4 04/10/2025       Recent Labs     04/18/25  1832 04/18/25  1951 04/18/25  2142 04/19/25  0801   POCGLU 77 89 84 72       Blood Sugar Average: Last 72 hrs:  (P) 81.6    Controlled  Will continue SSI  Hypoglycemia protocol

## 2025-04-19 NOTE — ASSESSMENT & PLAN NOTE
-Outpatient dialysis [facility]  - Outpatient schedule MWF and patient had dialysis yesterday  - Left upper extremity aneurysmal fistula without impending signs of rupture on exam with good thrill and bruit  - Per patient's wife patient had dialysis yesterday  -4/17-sodium, potassium, bicarbonate are appropriate.  Calcium was 8.  Troponin being trended.  Hemoglobin at 9.5.  Stable.  -4/18-potassium 5.2.  Sodium, bicarbonate appropriate.  Phosphorus 6.3.  Hemoglobin above goal 10.9.  Had hypotension in the evening requiring albumin and hypotension has resolved.  - 4/19-sodium, potassium, bicarbonate, magnesium are appropriate.  Phosphorus stable 5.6.     Plan  - Agree with holding Entresto for now  - Dialysis Monday from renal standpoint  - Continue midodrine with dialysis  - Reviewed case with cardiology attending earlier today and primary team resident  - Will need to clarify outpatient regimen regarding Sensipar but per notes it appears that he was on 90 mg 3 times a week and does not appear to have been on Hectorol.  Will need to call dialysis unit on Monday to confirm

## 2025-04-19 NOTE — ASSESSMENT & PLAN NOTE
CAD S/P multiple stents with history over the past years, most recently in 2025 2/27/2025 [facility]: Mid circumflex 10%, first obtuse marginal 100%, first %, severe in-stent restenosis of LAD and  of OM1 ([facility])  3/17/2025 [facility] LM normal, proximal LAD 90%, circumflex luminal irregularities, first obtuse marginal 100%, right system not described, successful balloon angioplasty to proximal LAD with 0% residual stenosis ([facility])  He is on ASA, effient, statin  Resume home meds as able

## 2025-04-20 LAB
ANION GAP SERPL CALCULATED.3IONS-SCNC: 11 MMOL/L (ref 4–13)
BASOPHILS # BLD AUTO: 0.04 THOUSANDS/ÂΜL (ref 0–0.1)
BASOPHILS NFR BLD AUTO: 1 % (ref 0–1)
BUN SERPL-MCNC: 50 MG/DL (ref 5–25)
CALCIUM SERPL-MCNC: 7.8 MG/DL (ref 8.4–10.2)
CHLORIDE SERPL-SCNC: 95 MMOL/L (ref 96–108)
CO2 SERPL-SCNC: 27 MMOL/L (ref 21–32)
CREAT SERPL-MCNC: 6.22 MG/DL (ref 0.6–1.3)
EOSINOPHIL # BLD AUTO: 0.1 THOUSAND/ÂΜL (ref 0–0.61)
EOSINOPHIL NFR BLD AUTO: 2 % (ref 0–6)
ERYTHROCYTE [DISTWIDTH] IN BLOOD BY AUTOMATED COUNT: 17.6 % (ref 11.6–15.1)
GFR SERPL CREATININE-BSD FRML MDRD: 8 ML/MIN/1.73SQ M
GLUCOSE SERPL-MCNC: 102 MG/DL (ref 65–140)
GLUCOSE SERPL-MCNC: 154 MG/DL (ref 65–140)
GLUCOSE SERPL-MCNC: 207 MG/DL (ref 65–140)
GLUCOSE SERPL-MCNC: 80 MG/DL (ref 65–140)
GLUCOSE SERPL-MCNC: 93 MG/DL (ref 65–140)
HCT VFR BLD AUTO: 35.7 % (ref 36.5–49.3)
HGB BLD-MCNC: 11.3 G/DL (ref 12–17)
IMM GRANULOCYTES # BLD AUTO: 0.04 THOUSAND/UL (ref 0–0.2)
IMM GRANULOCYTES NFR BLD AUTO: 1 % (ref 0–2)
LYMPHOCYTES # BLD AUTO: 0.83 THOUSANDS/ÂΜL (ref 0.6–4.47)
LYMPHOCYTES NFR BLD AUTO: 12 % (ref 14–44)
MAGNESIUM SERPL-MCNC: 2.1 MG/DL (ref 1.9–2.7)
MCH RBC QN AUTO: 31.5 PG (ref 26.8–34.3)
MCHC RBC AUTO-ENTMCNC: 31.7 G/DL (ref 31.4–37.4)
MCV RBC AUTO: 99 FL (ref 82–98)
MONOCYTES # BLD AUTO: 0.64 THOUSAND/ÂΜL (ref 0.17–1.22)
MONOCYTES NFR BLD AUTO: 10 % (ref 4–12)
NEUTROPHILS # BLD AUTO: 5.02 THOUSANDS/ÂΜL (ref 1.85–7.62)
NEUTS SEG NFR BLD AUTO: 74 % (ref 43–75)
NRBC BLD AUTO-RTO: 0 /100 WBCS
PHOSPHATE SERPL-MCNC: 5.9 MG/DL (ref 2.3–4.1)
PLATELET # BLD AUTO: 143 THOUSANDS/UL (ref 149–390)
PMV BLD AUTO: 10.8 FL (ref 8.9–12.7)
POTASSIUM SERPL-SCNC: 4.9 MMOL/L (ref 3.5–5.3)
RBC # BLD AUTO: 3.59 MILLION/UL (ref 3.88–5.62)
SODIUM SERPL-SCNC: 133 MMOL/L (ref 135–147)
WBC # BLD AUTO: 6.67 THOUSAND/UL (ref 4.31–10.16)

## 2025-04-20 PROCEDURE — 99232 SBSQ HOSP IP/OBS MODERATE 35: CPT | Performed by: INTERNAL MEDICINE

## 2025-04-20 PROCEDURE — 92526 ORAL FUNCTION THERAPY: CPT

## 2025-04-20 PROCEDURE — 83735 ASSAY OF MAGNESIUM: CPT

## 2025-04-20 PROCEDURE — 82948 REAGENT STRIP/BLOOD GLUCOSE: CPT

## 2025-04-20 PROCEDURE — 84100 ASSAY OF PHOSPHORUS: CPT

## 2025-04-20 PROCEDURE — 80048 BASIC METABOLIC PNL TOTAL CA: CPT

## 2025-04-20 PROCEDURE — 85025 COMPLETE CBC W/AUTO DIFF WBC: CPT

## 2025-04-20 RX ORDER — ACETAMINOPHEN 325 MG/1
650 TABLET ORAL EVERY 6 HOURS PRN
Status: DISCONTINUED | OUTPATIENT
Start: 2025-04-20 | End: 2025-04-24 | Stop reason: HOSPADM

## 2025-04-20 RX ORDER — METOPROLOL SUCCINATE 25 MG/1
25 TABLET, EXTENDED RELEASE ORAL 2 TIMES DAILY
Status: DISCONTINUED | OUTPATIENT
Start: 2025-04-20 | End: 2025-04-24 | Stop reason: HOSPADM

## 2025-04-20 RX ADMIN — METOPROLOL SUCCINATE 25 MG: 25 TABLET, EXTENDED RELEASE ORAL at 17:41

## 2025-04-20 RX ADMIN — CHLORHEXIDINE GLUCONATE 15 ML: 1.2 SOLUTION ORAL at 09:06

## 2025-04-20 RX ADMIN — METOPROLOL SUCCINATE 25 MG: 25 TABLET, EXTENDED RELEASE ORAL at 09:07

## 2025-04-20 RX ADMIN — ACETAMINOPHEN 650 MG: 325 TABLET, FILM COATED ORAL at 09:07

## 2025-04-20 RX ADMIN — ATORVASTATIN CALCIUM 40 MG: 40 TABLET, FILM COATED ORAL at 17:41

## 2025-04-20 RX ADMIN — APIXABAN 2.5 MG: 2.5 TABLET, FILM COATED ORAL at 17:41

## 2025-04-20 RX ADMIN — LIDOCAINE 5% 1 PATCH: 700 PATCH TOPICAL at 09:06

## 2025-04-20 RX ADMIN — APIXABAN 2.5 MG: 2.5 TABLET, FILM COATED ORAL at 09:07

## 2025-04-20 RX ADMIN — INSULIN LISPRO 2 UNITS: 100 INJECTION, SOLUTION INTRAVENOUS; SUBCUTANEOUS at 12:00

## 2025-04-20 RX ADMIN — PRASUGREL 10 MG: 10 TABLET, FILM COATED ORAL at 17:42

## 2025-04-20 RX ADMIN — CYANOCOBALAMIN TAB 500 MCG 1000 MCG: 500 TAB at 09:07

## 2025-04-20 RX ADMIN — INSULIN LISPRO 1 UNITS: 100 INJECTION, SOLUTION INTRAVENOUS; SUBCUTANEOUS at 17:47

## 2025-04-20 NOTE — PROGRESS NOTES
Progress Note - Hospitalist   Name: Asad Galloway 65 y.o. male I MRN: [id number]  Unit/Bed#: S -01 I Date of Admission: 4/17/2025   Date of Service: 4/20/2025 I Hospital Day: 3    Assessment & Plan  Fall  Per wife patient was waiting in the car while she attended a doctor's appointment.  Patient exit the car without his walker and subsequently fell.  Denies LOC or preceding symptoms   Found hypotensive  Trauma scans without fracture  CTH negative  CT ab/pel: Small subcutaneous hematoma of right hip without active bleed.  Otherwise no acute traumatic injury.  Cirrhosis with portal hypertension and small volume ascites.     Plan   Fall precautions  PT/OT  Mixed hyperlipidemia  Continue home Lipitor 40   Anemia  Recent Labs     04/18/25 2047 04/19/25  0627 04/20/25  0637   HGB 11.3* 11.6* 11.3*     Multifactorial etiology-ESRD, chronic disease  On admission Hb 9.5, s/p 2 unit whole blood for suspected hemorrhagic shock  Folate wnl  B12 384, will start vitamin B12 1000 mcg daily   ESRD on dialysis ([facility])  Lab Results   Component Value Date    EGFR 8 04/20/2025    EGFR 11 04/19/2025    EGFR 13 04/18/2025    CREATININE 6.22 (H) 04/20/2025    CREATININE 4.92 (H) 04/19/2025    CREATININE 4.36 (H) 04/18/2025     M[facility] schedule [facility]  Nephrology consulted appreciate recommendations  CAD, multiple vessel  CAD S/P multiple stents with history over the past years, most recently in 2025 2/27/2025 [facility]: Mid circumflex 10%, first obtuse marginal 100%, first %, severe in-stent restenosis of LAD and  of OM1 ([facility])  3/17/2025 [facility] LM normal, proximal LAD 90%, circumflex luminal irregularities, first obtuse marginal 100%, right system not described, successful balloon angioplasty to proximal LAD with 0% residual stenosis ([facility])  He is on ASA, effient, statin  Resume home meds as able  Acute hypoxic respiratory failure (HCC)  Patient was noted to be hypoxic at the CT when laid flat  and reportedly hypotensive and thus was intubated in the ED.  Reportedly has been coughing for ~1- 2 weeks per wife  COVID flu RSV negative  WBC normal  procal 0.41  Urine antigens not obtained as patient is anuric  CT chest: No large PE, Cardiomegaly with small right pleural effusion and interstitial edema. Cirrhosis with ascites.  CXR: Mild pulmonary vascular congestion with trace pleural effusions     Plan  Monitoring off ABX  Follow up MRSA  Obtain sputum culture  Volume management with dialysis  Ischemic cardiomyopathy  Echo March 2025 LVEF 35-40%, global hypokinesis with regional variations, moderately reduced RV systolic function, severe left atrial dilation moderate to severely calcified aortic valve with moderate to severe aortic stenosis   Aortic stenosis  Severe AS, scheduled for TAVR in April 2025 at [facility]   Acute embolism and thrombosis of right peroneal vein (HCC)  Hx of upper extremity DVT in right upper extremity of eliquis 2.5 bid  Eliquis 2.5mg BID  Type 2 diabetes mellitus (HCC)  Lab Results   Component Value Date    HGBA1C 5.4 04/10/2025       Recent Labs     04/19/25  1654 04/19/25  2058 04/20/25  0801 04/20/25  1132   POCGLU 82 116 93 207*       Blood Sugar Average: Last 72 hrs:  (P) 102.1    Controlled  Will continue SSI  Hypoglycemia protocol  Chronic kidney disease-mineral and bone disorder  Lab Results   Component Value Date    EGFR 8 04/20/2025    EGFR 11 04/19/2025    EGFR 13 04/18/2025    CREATININE 6.22 (H) 04/20/2025    CREATININE 4.92 (H) 04/19/2025    CREATININE 4.36 (H) 04/18/2025     Calcium and magnesium stable  hx hyperphosphatemia.  Binders recently discontinued during recent hospitalization at [place].  Continue low phosphorus diet  Secondary hyperparathyroidism of renal origin: On Hectorol 2 mcg 3 times a week with HD and Sensipar 90 mg 3 times a week with HD  Continue to monitor and manage as outpatient  HFrEF (heart failure with reduced ejection fraction) ([facility])  Wt  Readings from Last 3 Encounters:   04/20/25 101 kg (222 lb 10.6 oz)   04/09/25 100 kg (221 lb)   04/01/25 97.1 kg (214 lb)     Combined systolic and diastolic HF, [facility]  Last echo March 2025- Ef 35-40% with grade III DD, global hypokinesis, severely LA enlargement  Medications;Toprol-XL 25 twice daily, Entresto 24-26 twice daily with Volume management: Dialysis  For this admission volume up with concern for cardiogenic shock     BNP >4700  Trop 130/ 117/107  CXR Mild pulmonary venous congestion with trace pleural effusions.   4/17 echo EF 20%, severe global hypokinesis, ungraded diastolic dysfunction, severe RV dilation moderate AAS, moderate MR moderate TR  Plan   Volume per Dialysis  Home home medications as able  Cardiology consulted for new reduction in EF, recommendations appreciated  Life[employer] assessment completed 4/19, to be set up before discharge  Cirrhosis (HCC)  Known hx cirrhosis. Re-demonstrated on CTA chest/PE 4/17     VTE Pharmacologic Prophylaxis:   High Risk (Score >/= 5) - Pharmacological DVT Prophylaxis Ordered: apixaban (Eliquis). Sequential Compression Devices Ordered.    Mobility:   Basic Mobility Inpatient Raw Score: 12  JH-HLM Goal: 4: Move to chair/commode  JH-HLM Achieved: 4: Move to chair/commode  JH-HLM Goal achieved. Continue to encourage appropriate mobility.    Patient Centered Rounds: I evaluated the patient without nursing staff present due to nursing staff elsewhere    Discussions with Specialists or Other Care Team Provider: Dr Adams    Education and Discussions with Family / Patient: Updated  (wife) via phone.    Current Length of Stay: 3 day(s)  Current Patient Status: Inpatient   Certification Statement: The patient will continue to require additional inpatient hospital stay due to AHRF, heart failure   Discharge Plan: Anticipate discharge in 48 hrs to discharge location to be determined pending rehab evaluations.    Code Status: Level 1 - Full Code    Subjective    Pt was seen this AM before rounds. He notes he has some R shoulder pain and otherwise is is without complaint.    Objective :  Temp:  [97.7 °F (36.5 °C)-99.1 °F (37.3 °C)] 97.7 °F (36.5 °C)  HR:  [82-87] 86  BP: ()/(46-64) 115/64  Resp:  [16-18] 16  SpO2:  [90 %-99 %] 93 %  O2 Device: None (Room air)    Body mass index is 32.88 kg/m².     Input and Output Summary (last 24 hours):     Intake/Output Summary (Last 24 hours) at 4/20/2025 1238  Last data filed at 4/20/2025 1100  Gross per 24 hour   Intake 400 ml   Output 0 ml   Net 400 ml       Physical Exam  Vitals and nursing note reviewed.   Constitutional:       General: He is not in acute distress.     Appearance: He is well-developed.   HENT:      Head: Normocephalic and atraumatic.   Eyes:      Conjunctiva/sclera: Conjunctivae normal.   Cardiovascular:      Rate and Rhythm: Normal rate and regular rhythm.      Heart sounds: No murmur heard.  Pulmonary:      Effort: Pulmonary effort is normal. No respiratory distress.      Breath sounds: Normal breath sounds.      Comments: 2L NC in place  Abdominal:      Palpations: Abdomen is soft.   Musculoskeletal:         General: No swelling, deformity or signs of injury. Normal range of motion.      Cervical back: Neck supple.   Skin:     General: Skin is warm and dry.      Capillary Refill: Capillary refill takes less than 2 seconds.   Neurological:      Mental Status: He is alert.   Psychiatric:         Mood and Affect: Mood normal.         Lines/Drains:              Lab Results: I have reviewed the following results:   Results from last 7 days   Lab Units 04/20/25  0637   WBC Thousand/uL 6.67   HEMOGLOBIN g/dL 11.3*   HEMATOCRIT % 35.7*   PLATELETS Thousands/uL 143*   SEGS PCT % 74   LYMPHO PCT % 12*   MONO PCT % 10   EOS PCT % 2     Results from last 7 days   Lab Units 04/20/25  0637 04/18/25 2047 04/18/25  0446   SODIUM mmol/L 133*   < > 135   POTASSIUM mmol/L 4.9   < > 5.2   CHLORIDE mmol/L 95*   < > 95*   CO2  mmol/L 27   < > 27   BUN mg/dL 50*   < > 33*   CREATININE mg/dL 6.22*   < > 5.30*   ANION GAP mmol/L 11   < > 13   CALCIUM mg/dL 7.8*   < > 7.8*   ALBUMIN g/dL  --   --  3.7   TOTAL BILIRUBIN mg/dL  --   --  0.97   ALK PHOS U/L  --   --  130*   ALT U/L  --   --  9   AST U/L  --   --  13   GLUCOSE RANDOM mg/dL 80   < > 92    < > = values in this interval not displayed.     Results from last 7 days   Lab Units 04/18/25  0446   INR  1.35*     Results from last 7 days   Lab Units 04/20/25  1132 04/20/25  0801 04/19/25  2058 04/19/25  1654 04/19/25  1150 04/19/25  0801 04/18/25  2142 04/18/25  1951 04/18/25  1832 04/18/25  1753   POC GLUCOSE mg/dl 207* 93 116 82 115 72 84 89 77 86         Results from last 7 days   Lab Units 04/18/25  0446 04/17/25  1347   LACTIC ACID mmol/L  --  1.4   PROCALCITONIN ng/ml 0.47*  --        Recent Cultures (last 7 days):   Results from last 7 days   Lab Units 04/17/25  1641   BLOOD CULTURE  No Growth at 48 hrs.  No Growth at 48 hrs.       Imaging Results Review: I reviewed radiology reports from this admission including: chest xray.  Other Study Results Review: No additional pertinent studies reviewed.    Last 24 Hours Medication List:     Current Facility-Administered Medications:     acetaminophen (TYLENOL) tablet 650 mg, Q6H PRN    apixaban (ELIQUIS) tablet 2.5 mg, BID    atorvastatin (LIPITOR) tablet 40 mg, Daily With Dinner    chlorhexidine (PERIDEX) 0.12 % oral rinse 15 mL, Q12H MICH    [Held by provider] Cholecalciferol (VITAMIN D3) tablet 1,000 Units, Daily    [Held by provider] cinacalcet (SENSIPAR) tablet 30 mg, Daily With Breakfast    cyanocobalamin (VITAMIN B-12) tablet 1,000 mcg, Daily    [Transfer Hold] guaiFENesin (ROBITUSSIN) oral liquid 200 mg, Q4H PRN    insulin lispro (HumALOG/ADMELOG) 100 units/mL subcutaneous injection 1-6 Units, 4x Daily (PC & HS) **AND** Fingerstick Glucose (POCT), 4x Daily AC and at bedtime    levalbuterol (XOPENEX) inhalation solution 0.63 mg, Q6H  PRN    lidocaine (LIDODERM) 5 % patch 1 patch, Daily    [Held by provider] melatonin tablet 3 mg, HS    metoprolol succinate (TOPROL-XL) 24 hr tablet 25 mg, BID    midodrine (PROAMATINE) tablet 10 mg, Before Dialysis    prasugrel (EFFIENT) tablet 10 mg, Daily    [Held by provider] sacubitril-valsartan (ENTRESTO) 24-26 MG per tablet 1 tablet, BID    Administrative Statements   Today, Patient Was Seen By: Edward Toussaint MD  I have spent a total time of 30 minutes in caring for this patient on the day of the visit/encounter including Impressions, Counseling / Coordination of care, Documenting in the medical record, Reviewing/placing orders in the medical record (including tests, medications, and/or procedures), Obtaining or reviewing history  , and Communicating with other healthcare professionals .    **Please Note: This note may have been constructed using a voice recognition system.**

## 2025-04-20 NOTE — ASSESSMENT & PLAN NOTE
Lab Results   Component Value Date    HGBA1C 5.4 04/10/2025       Recent Labs     04/19/25  1654 04/19/25 2058 04/20/25  0801 04/20/25  1132   POCGLU 82 116 93 207*       Blood Sugar Average: Last 72 hrs:  (P) 102.1    Controlled  Will continue SSI  Hypoglycemia protocol

## 2025-04-20 NOTE — ASSESSMENT & PLAN NOTE
CAD S/P multiple stents with history over the past years, most recently in 2025 2/27/2025 [facility]: Mid circumflex 10%, first obtuse marginal 100%, first %, severe in-stent restenosis of LAD and  of OM1 ([facility])  3/17/2025 [facility] LM normal, proximal LAD 90%, circumflex luminal irregularities, first obtuse marginal 100%, right system not described, successful balloon angioplasty to proximal LAD with 0% residual stenosis ([facility])  He is on ASA, effient, statin  Resume home meds as able

## 2025-04-20 NOTE — ASSESSMENT & PLAN NOTE
Lab Results   Component Value Date    EGFR 8 04/20/2025    EGFR 11 04/19/2025    EGFR 13 04/18/2025    CREATININE 6.22 (H) 04/20/2025    CREATININE 4.92 (H) 04/19/2025    CREATININE 4.36 (H) 04/18/2025     Calcium and magnesium stable  hx hyperphosphatemia.  Binders recently discontinued during recent hospitalization at [facility].  Continue low phosphorus diet  Secondary hyperparathyroidism of renal origin: On Hectorol 2 mcg 3 times a week with HD and Sensipar 90 mg 3 times a week with HD  Continue to monitor and manage as outpatient

## 2025-04-20 NOTE — PLAN OF CARE
Problem: Prexisting or High Potential for Compromised Skin Integrity  Goal: Skin integrity is maintained or improved  Description: INTERVENTIONS:- Identify patients at risk for skin breakdown- Assess and monitor skin integrity- Assess and monitor nutrition and hydration status- Monitor labs - Assess for incontinence - Turn and reposition patient- Assist with mobility/ambulation- Relieve pressure over bony prominences- Avoid friction and shearing- Provide appropriate hygiene as needed including keeping skin clean and dry- Evaluate need for skin moisturizer/barrier cream- Collaborate with interdisciplinary team - Patient/family teaching- Consider wound care consult   Outcome: Progressing     Problem: PAIN - ADULT  Goal: Verbalizes/displays adequate comfort level or baseline comfort level  Description: Interventions:- Encourage patient to monitor pain and request assistance- Assess pain using appropriate pain scale- Administer analgesics based on type and severity of pain and evaluate response- Implement non-pharmacological measures as appropriate and evaluate response- Consider cultural and social influences on pain and pain management- Notify physician/advanced practitioner if interventions unsuccessful or patient reports new pain  Outcome: Progressing     Problem: INFECTION - ADULT  Goal: Absence or prevention of progression during hospitalization  Description: INTERVENTIONS:- Assess and monitor for signs and symptoms of infection- Monitor lab/diagnostic results- Monitor all insertion sites, i.e. indwelling lines, tubes, and drains- Monitor endotracheal if appropriate and nasal secretions for changes in amount and color- Lena appropriate cooling/warming therapies per order- Administer medications as ordered- Instruct and encourage patient and family to use good hand hygiene technique- Identify and instruct in appropriate isolation precautions for identified infection/condition  Outcome: Progressing  Goal:  Absence of fever/infection during neutropenic period  Description: INTERVENTIONS:- Monitor WBC  Outcome: Progressing     Problem: SAFETY ADULT  Goal: Patient will remain free of falls  Description: INTERVENTIONS:- Educate patient/family on patient safety including physical limitations- Instruct patient to call for assistance with activity - Consult OT/PT to assist with strengthening/mobility - Keep Call bell within reach- Keep bed low and locked with side rails adjusted as appropriate- Keep care items and personal belongings within reach- Initiate and maintain comfort rounds- Make Fall Risk Sign visible to staff- Offer Toileting every 2 Hours, in advance of need- Initiate/Maintain bed alarm -Apply yellow socks and bracelet for high fall risk patients- Consider moving patient to room near nurses station  Outcome: Progressing  Goal: Maintain or return to baseline ADL function  Description: INTERVENTIONS:-  Assess patient's ability to carry out ADLs; assess patient's baseline for ADL function and identify physical deficits which impact ability to perform ADLs (bathing, care of mouth/teeth, toileting, grooming, dressing, etc.)- Assess/evaluate cause of self-care deficits - Assess range of motion- Assess patient's mobility; develop plan if impaired- Assess patient's need for assistive devices and provide as appropriate- Encourage maximum independence but intervene and supervise when necessary- Involve family in performance of ADLs- Assess for home care needs following discharge - Consider OT consult to assist with ADL evaluation and planning for discharge- Provide patient education as appropriate  Outcome: Progressing  Goal: Maintains/Returns to pre admission functional level  Description: INTERVENTIONS:- Perform AM-PAC 6 Click Basic Mobility/ Daily Activity assessment daily.- Set and communicate daily mobility goal to care team and patient/family/caregiver. - Collaborate with rehabilitation services on mobility goals if  consulted- Reposition patient every 2 hours.- Out of bed for meals 3 times a day- Out of bed for toileting- Record patient progress and toleration of activity level   Outcome: Progressing     Problem: DISCHARGE PLANNING  Goal: Discharge to home or other facility with appropriate resources  Description: INTERVENTIONS:- Identify barriers to discharge w/patient and caregiver- Arrange for needed discharge resources and transportation as appropriate- Identify discharge learning needs (meds, wound care, etc.)- Arrange for interpretive services to assist at discharge as needed- Refer to Case Management Department for coordinating discharge planning if the patient needs post-hospital services based on physician/advanced practitioner order or complex needs related to functional status, cognitive ability, or social support system  Outcome: Progressing     Problem: Knowledge Deficit  Goal: Patient/family/caregiver demonstrates understanding of disease process, treatment plan, medications, and discharge instructions  Description: Complete learning assessment and assess knowledge base.Interventions:- Provide teaching at level of understanding- Provide teaching via preferred learning methods  Outcome: Progressing     Problem: Nutrition/Hydration-ADULT  Goal: Nutrient/Hydration intake appropriate for improving, restoring or maintaining nutritional needs  Description: Monitor and assess patient's nutrition/hydration status for malnutrition. Collaborate with interdisciplinary team and initiate plan and interventions as ordered.  Monitor patient's weight and dietary intake as ordered or per policy. Utilize nutrition screening tool and intervene as necessary. Determine patient's food preferences and provide high-protein, high-caloric foods as appropriate. INTERVENTIONS:- Monitor oral intake, urinary output, labs, and treatment plans- Assess nutrition and hydration status and recommend course of action- Evaluate amount of meals eaten-  Assist patient with eating if necessary - Allow adequate time for meals- Recommend/ encourage appropriate diets, oral nutritional supplements, and vitamin/mineral supplements- Order, calculate, and assess calorie counts as needed- Recommend, monitor, and adjust tube feedings and TPN/PPN based on assessed needs- Assess need for intravenous fluids- Provide specific nutrition/hydration education as appropriate- Include patient/family/caregiver in decisions related to nutrition  Outcome: Progressing

## 2025-04-20 NOTE — PROGRESS NOTES
"Cardiology Progress Note - Asad Galloway 65 y.o. male MRN: [id number]    Unit/Bed#: S -01 Encounter: [id number]    Assessment and plan  #1 fall likely due to low blood pressure  #2 multivessel CAD with recent intervention at [facility]  #3 end-stage renal disease on dialysis  #4 acute hypoxemic respiratory failure  #5 ischemic cardiomyopathy ejection fraction my review of 30 to 35%  #6 moderate aortic stenosis and insufficiency  #7 mitral regurgitation  #8 thrombosis of right peroneal vein on Eliquis  #9 anemia  #10 chronic heart failure with reduced ejection fraction      Recommendations: I suspect initial fall was due to hypotension.  Blood pressures have been improving.  He received 2 units of blood which likely caused some flash pulmonary edema.  Volume management on dialysis.    Resume Toprol-XL twice daily dosing today.  Observe if blood pressures can handle can resume afterload reducing agents tomorrow.  Not ready for discharge yet.  Recommend flutter valve.    Will need to resume LifeVest at discharge.    Subjective:    No significant events overnight.  Feels a little bit better today.  Denies any chest pain breathing is a little easier still has a lot of mucus and cough.  ROS    Objective:   Vitals: Blood pressure 116/58, pulse 83, temperature 97.7 °F (36.5 °C), temperature source Axillary, resp. rate 16, height 5' 9\" (1.753 m), weight 101 kg (222 lb 10.6 oz), SpO2 99%., Body mass index is 32.88 kg/m².,   Orthostatic Blood Pressures      Flowsheet Row Most Recent Value   Blood Pressure 116/58 filed at 2025 0340   Patient Position - Orthostatic VS Lying filed at 2025 2139           Systolic (24hrs), Av , Min:92 , Max:116     Diastolic (24hrs), Av, Min:46, Max:60      Intake/Output Summary (Last 24 hours) at 2025 0739  Last data filed at 2025 2201  Gross per 24 hour   Intake 100 ml   Output 0 ml   Net 100 ml     Weight (last 2 days)       Date/Time Weight    25 0600 " 101 (222.66)    04/19/25 0600 101 (221.78)    04/18/25 0551 91.3 (201.28)    04/18/25 0200 91.3 (201.28)              Telemetry Review: No significant arrhythmias seen on telemetry review.   EKG personally reviewed by Rayo Nascimento DO.     Physical Exam:  Vital signs reviewed  General:  Alert and cooperative, appears stated age, no acute distress  HEENT:  PERRLA, EOMI, no scleral icterus, no conjunctival pallor  Neck:  No lymphadenopathy, no thyromegaly, no carotid bruits, no elevated JVP  Heart:  Regular rate and rhythm, normal S1/S2, no S3/S4, no murmur, rubs or gallops.  PMI nondisplaced  Lungs: Scattered rhonchi   abdomen:  Soft, non-tender, positive bowel sounds, no rebound or guarding,   no organomegaly   Extremities:  Normal range of motion.  No clubbing, cyanosis or edema   Vascular:  2+ pedal pulses  Skin:  No rashes or lesions on exposed skin  Neurologic:  Cranial nerves II-XII grossly intact without focal deficits  Psych:  Normal mood and affect        Laboratory Results:        CBC with diff:   Results from last 7 days   Lab Units 04/20/25  0637 04/19/25  0627 04/18/25  2047 04/18/25  0446 04/17/25  1440 04/17/25  1419 04/17/25  1348 04/17/25  1347 04/17/25  1020   WBC Thousand/uL 6.67 6.70 6.79 4.65  --   --   --  5.07 6.09   HEMOGLOBIN g/dL 11.3* 11.6* 11.3* 10.9*  --   --   --  9.5* 10.0*   I STAT HEMOGLOBIN g/dl  --   --   --   --  10.2* 9.9* 10.5*  --   --    HEMATOCRIT % 35.7* 37.2 36.0* 34.7*  --   --   --  31.3* 33.1*   HEMATOCRIT, ISTAT %  --   --   --   --  30* 29* 31*  --   --    MCV fL 99* 102* 99* 100*  --   --   --  104* 106*   PLATELETS Thousands/uL 143* 122* 119* 106*  --   --   --  108* 105*   RBC Million/uL 3.59* 3.65* 3.63* 3.48*  --   --   --  3.01* 3.12*   MCH pg 31.5 31.8 31.1 31.3  --   --   --  31.6 32.1   MCHC g/dL 31.7 31.2* 31.4 31.4  --   --   --  30.4* 30.2*   RDW % 17.6* 18.2* 18.3* 18.8*  --   --   --  17.7* 17.6*   MPV fL 10.8 11.6 11.1 11.7  --   --   --  11.9 12.5    NRBC AUTO /100 WBCs 0  --   --  0  --   --   --  0 0         CMP:  Results from last 7 days   Lab Units 04/20/25 0637 04/19/25 0627 04/18/25 2047 04/18/25 0446 04/17/25  1440 04/17/25  1419 04/17/25  1348 04/17/25  1347   POTASSIUM mmol/L 4.9 4.6 4.6 5.2  --   --   --  4.3   CHLORIDE mmol/L 95* 96 96 95*  --   --   --  96   CO2 mmol/L 27 27 28 27  --   --   --  32   CO2, I-STAT mmol/L  --   --   --   --  21 31 33*  --    BUN mg/dL 50* 36* 28* 33*  --   --   --  25   CREATININE mg/dL 6.22* 4.92* 4.36* 5.30*  --   --   --  4.62*   GLUCOSE, ISTAT mg/dl  --   --   --   --  67 79 89  --    CALCIUM mg/dL 7.8* 8.0* 8.0* 7.8*  --   --   --  8.0*   AST U/L  --   --   --  13  --   --   --  13   ALT U/L  --   --   --  9  --   --   --  9   ALK PHOS U/L  --   --   --  130*  --   --   --  126*   EGFR ml/min/1.73sq m 8 11 13 10  --   --   --  12         BMP:  Results from last 7 days   Lab Units 04/20/25 0637 04/19/25 0627 04/18/25 2047 04/18/25 0446 04/17/25  1440 04/17/25  1419 04/17/25  1348 04/17/25  1348 04/17/25  1347   POTASSIUM mmol/L 4.9 4.6 4.6 5.2  --   --   --   --  4.3   CHLORIDE mmol/L 95* 96 96 95*  --   --   --   --  96   CO2 mmol/L 27 27 28 27  --   --   --   --  32   CO2, I-STAT mmol/L  --   --   --   --  21 31  --  33*  --    BUN mg/dL 50* 36* 28* 33*  --   --   --   --  25   CREATININE mg/dL 6.22* 4.92* 4.36* 5.30*  --   --   --   --  4.62*   GLUCOSE, ISTAT mg/dl  --   --   --   --  67 79   < > 89  --    CALCIUM mg/dL 7.8* 8.0* 8.0* 7.8*  --   --   --   --  8.0*    < > = values in this interval not displayed.       BNP:   Recent Labs     04/17/25  1347   BNP >4,700*       Magnesium:   Results from last 7 days   Lab Units 04/20/25  0637 04/19/25  0627 04/18/25 2047 04/18/25  0446   MAGNESIUM mg/dL 2.1 2.0 2.0 2.0       Coags:   Results from last 7 days   Lab Units 04/18/25  0446 04/17/25  1347   PTT seconds  --  38*   INR  1.35* 1.50*       TSH:        Hemoglobin A1C       Lipid Profile:       Cardiac  testing:   No results found for this or any previous visit.    No results found for this or any previous visit.    No results found for this or any previous visit.    No results found for this or any previous visit.      Meds/Allergies   all current active meds have been reviewed    Medications Prior to Admission:     apixaban (Eliquis) 2.5 mg    aspirin 81 mg chewable tablet    atorvastatin (LIPITOR) 40 mg tablet    cinacalcet (SENSIPAR) 30 mg tablet    [] doxycycline hyclate (VIBRAMYCIN) 100 mg capsule    melatonin 3 mg    metoprolol succinate (TOPROL-XL) 25 mg 24 hr tablet    midodrine (PROAMATINE) 10 MG tablet    midodrine (PROAMATINE) 2.5 mg tablet    MIDODRINE HCL PO    mupirocin (BACTROBAN) 2 % ointment    prasugrel (EFFIENT) tablet    sacubitril-valsartan (Entresto) 24-26 MG TABS    Sevelamer Carbonate 2.4 g    Vitamin D3 1.25 MG (91759 UT) capsule       Assessment:  Principal Problem:    Fall  Active Problems:    Mixed hyperlipidemia    Anemia    ESRD on dialysis (HCC)    CAD, multiple vessel    Acute hypoxic respiratory failure (HCC)    Ischemic cardiomyopathy    Aortic stenosis    Acute embolism and thrombosis of right peroneal vein (HCC)    Type 2 diabetes mellitus (HCC)    Chronic kidney disease-mineral and bone disorder    HFrEF (heart failure with reduced ejection fraction) (HCC)    Cirrhosis (HCC)            Counseling / Coordination of Care  Total floor / unit time spent today 25 minutes.  Greater than 50% of total time was spent with the patient and / or family counseling and / or coordination of care.  A description of the counseling / coordination of care: .

## 2025-04-20 NOTE — ASSESSMENT & PLAN NOTE
Wt Readings from Last 3 Encounters:   04/20/25 101 kg (222 lb 10.6 oz)   04/09/25 100 kg (221 lb)   04/01/25 97.1 kg (214 lb)     Combined systolic and diastolic HF, [facility]  Last echo March 2025- Ef 35-40% with grade III DD, global hypokinesis, severely LA enlargement  Medications;Toprol-XL 25 twice daily, Entresto 24-26 twice daily with Volume management: Dialysis  For this admission volume up with concern for cardiogenic shock     BNP >4700  Trop 130/ 117/107  CXR Mild pulmonary venous congestion with trace pleural effusions.   4/17 echo EF 20%, severe global hypokinesis, ungraded diastolic dysfunction, severe RV dilation moderate AAS, moderate MR moderate TR  Plan   Volume per Dialysis  Home home medications as able  Cardiology consulted for new reduction in EF, recommendations appreciated  Lifevest assessment completed 4/19, to be set up before discharge

## 2025-04-20 NOTE — ASSESSMENT & PLAN NOTE
Recent Labs     04/18/25 2047 04/19/25  0627 04/20/25  0637   HGB 11.3* 11.6* 11.3*     Multifactorial etiology-ESRD, chronic disease  On admission Hb 9.5, s/p 2 unit whole blood for suspected hemorrhagic shock  Folate wnl  B12 384, will start vitamin B12 1000 mcg daily

## 2025-04-20 NOTE — ASSESSMENT & PLAN NOTE
is on Mircera as outpatient.  Currently hemoglobin is above goal.  Initially received 2 units of transfusion due to acute bleed concerns on trauma workup initially

## 2025-04-20 NOTE — ASSESSMENT & PLAN NOTE
Lab Results   Component Value Date    EGFR 8 04/20/2025    EGFR 11 04/19/2025    EGFR 13 04/18/2025    CREATININE 6.22 (H) 04/20/2025    CREATININE 4.92 (H) 04/19/2025    CREATININE 4.36 (H) 04/18/2025     MWF schedule at [facility]  Nephrology consulted appreciate recommendations

## 2025-04-20 NOTE — PROGRESS NOTES
Progress Note - Nephrology   Name: Asad Galloway 65 y.o. male I MRN: [id number]  Unit/Bed#: S -01 I Date of Admission: 4/17/2025   Date of Service: 4/20/2025 I Hospital Day: 3    Assessment & Plan  ESRD on dialysis (HCC)  -Outpatient dialysis [facility]  - Outpatient schedule MWF and patient had dialysis yesterday  - Left upper extremity aneurysmal fistula without impending signs of rupture on exam with good thrill and bruit  - Per patient's wife patient had dialysis yesterday  -4/17-sodium, potassium, bicarbonate are appropriate.  Calcium was 8.  Troponin being trended.  Hemoglobin at 9.5.  Stable.  -4/18-potassium 5.2.  Sodium, bicarbonate appropriate.  Phosphorus 6.3.  Hemoglobin above goal 10.9.  Had hypotension in the evening requiring albumin and hypotension has resolved.  - 4/19-sodium, potassium, bicarbonate, magnesium are appropriate.  Phosphorus stable 5.6.  No urgent dialysis need  - 4/19-sodium, potassium, bicarbonate appropriate.  Phosphorus elevated 5.9.  Cardiology team reintroducing beta-blocker today.  As blood pressures are remaining stable     Plan  - Agree with holding Entresto for now  - Noted cardiology team is reducing metoprolol today  - Dialysis Monday from renal standpoint  - Continue midodrine with dialysis  - Reviewed case primary team resident we are in agreement with dialysis Monday  - Will need to clarify outpatient regimen regarding Sensipar but per notes it appears that he was on 90 mg 3 times a week and does not appear to have been on Hectorol.  Will need to call dialysis unit on Monday to confirm and will also need to confirm phosphorus binders as the note states that the patient may not have been compliant?  Fall  -Etiology unclear.  Patient's family states that the patient attempted to get out of his car on his own without his walker and they believe that the fall was mechanical.  The patient had the acute events as outlined above after arrival  Anemia   is on Mircera as  outpatient.  Currently hemoglobin is above goal.  Initially received 2 units of transfusion due to acute bleed concerns on trauma workup initially    Chronic kidney disease-mineral and bone disorder  -Is on Hectorol 2 mcg 3 times a week with dialysis?  - Sensipar was recently discontinued per prior notes?  - Phosphorus binders were recently held at [facility].  Trend phosphorus for now.  May need to consider restarting phosphorus binders  - Per review of outpatient dialysis note on 4/16-patient was on Sensipar to 90 mg but there is not mention of Hectorol.  There was concern with compliance with Renvela.    Acute embolism and thrombosis of right peroneal vein (HCC)  -Per primary team  HFrEF (heart failure with reduced ejection fraction) ([facility])  -EF 35 to 40% with grade 2 diastolic dysfunction with moderate to severe aortic stenoses, mild to moderate MR, mild to moderate TR  - Patient with known CAD with recent stent placement at [facility] in preparation for TAVR  - Further plans per cardiology team  Cirrhosis (HCC)  -per primary team  Aortic stenosis  - Scheduled for TAVR at [facility] mid April  Acute hypoxic respiratory failure (HCC)  -Initially intubated.   now extubated  - CT scan of the chest-no PE.  Cardiomegaly with small right effusion and interstitial edema.    I have reviewed the nephrology recommendations including dialysis Monday, with primary team resident, and we are in agreement with renal plan including the information outlined above.     Subjective   Brief History of Admission - 65 y.o. male with a past medical history of ESRD on hemodialysis at [facility], CAD with recent stenting for restenosis at [facility], hypertension, hyperlipidemia, aortic stenoses awaiting TAVR, PVD, history of DVT, recent admission at [facility] for CTA and cardiac catheterization for TAVR workup reportedly received CRRT during that time, subsequently admitted to [facility] in  March 2025 with agitation and encephalopathy who was admitted to [facility] on 4/17 after presenting with fall. A renal consultation is requested today for assistance in the management of ESRD.  Patient was recently discharged on March 24.  Now presenting back to the hospital after a fall.  Patient was with his wife in the car and his wife was at a doctor's appointment so when the wife went inside for the appointment, patient reportedly got out of the car to go to a nearby restaurant and unfortunately fell.  Was brought to the hospital for evaluation for trauma.     Patient's wife reports that he was in his usual state of health without any issues and it was his wife who needed to go to the doctor today.  But there are notes in the chart from urgent care at 9 AM that patient was there with a cough for 1 week and was felt to have URI conservatively managed.     In the hospital, patient was transported to [facility] as a trauma alert.  Was hypotensive to 60s systolic.  There was possible concern for possible bleed and therefore was sent for urgent CAT scan.  There was small subcutaneous hematoma in the right hip without evidence of active bleeding.  Cirrhosis with portal hypertension.  Patient ultimately required intubation.  Was transferred to the intensive care unit for further evaluation.  Was on Levophed initially.  Levophed was weaned off and blood pressures remained appropriate.  When I saw the patient earlier this evening patient was intubated with plans for possible extubation        Past 24-hour no acute issues.  Patient states his energy level is improving and weakness is improving today.  He is asking when he can be discharged.  We reviewed that he is not yet stable for discharge as there is still medication adjustments that need to be made from a cardiac standpoint.  No shortness of breath today.      Objective :  Temp:  [97.7 °F (36.5 °C)-99.1 °F (37.3 °C)] 97.7 °F (36.5 °C)  HR:  [82-87] 86  BP:  ()/(46-64) 115/64  Resp:  [16-18] 16  SpO2:  [90 %-99 %] 93 %  O2 Device: None (Room air)    Current Weight: Weight - Scale: 101 kg (222 lb 10.6 oz)  First Weight: Weight - Scale: 103 kg (227 lb 1.2 oz)  I/O         04/18 0701  04/19 0700 04/19 0701  04/20 0700 04/20 0701  04/21 0700    P.O.  100     I.V. (mL/kg) 500 (5)      Blood       Total Intake(mL/kg) 500 (5) 100 (1)     Urine (mL/kg/hr)  0 (0)     Other 1000      Total Output 1000 0     Net -500 +100                  Physical Exam  General: NAD  Skin: no rash  Eyes: anicteric sclera  ENT: moist mucous membrane, laceration over nose not actively bleeding  Neck: supple  Chest: CTA b/l, no ronchii, no wheeze, no rubs, no rales  CVS: s1s2, positive murmur, no gallop, no rub  Abdomen: soft, nontender, nl sounds  Extremities: no edema LE b/l, left upper extremity AV fistula  : no chávez  Neuro: AAOX3  Psych: normal affect    Medications:    Current Facility-Administered Medications:     acetaminophen (TYLENOL) tablet 650 mg, 650 mg, Oral, Q6H PRN, Edward Hanna MD, 650 mg at 04/20/25 0907    apixaban (ELIQUIS) tablet 2.5 mg, 2.5 mg, Oral, BID, Jeannie Braswell MD, 2.5 mg at 04/20/25 0907    atorvastatin (LIPITOR) tablet 40 mg, 40 mg, Oral, Daily With Dinner, Jeannie Braswell MD, 40 mg at 04/19/25 1736    chlorhexidine (PERIDEX) 0.12 % oral rinse 15 mL, 15 mL, Mouth/Throat, Q12H MICH, Jeannie Braswell MD, 15 mL at 04/20/25 0906    [Held by provider] Cholecalciferol (VITAMIN D3) tablet 1,000 Units, 1,000 Units, Oral, Daily, Jeannie Braswell MD    [Held by provider] cinacalcet (SENSIPAR) tablet 30 mg, 30 mg, Oral, Daily With Breakfast, Jeannie Braswell MD    cyanocobalamin (VITAMIN B-12) tablet 1,000 mcg, 1,000 mcg, Oral, Daily, Aniyah Guerrero DO, 1,000 mcg at 04/20/25 0907    [Transfer Hold] guaiFENesin (ROBITUSSIN) oral liquid 200 mg, 200 mg, Oral, Q4H PRN, BRITTANY Wellington, 200 mg at 04/18/25 1952    insulin lispro (HumALOG/ADMELOG) 100 units/mL subcutaneous  injection 1-6 Units, 1-6 Units, Subcutaneous, 4x Daily (PC & HS) **AND** Fingerstick Glucose (POCT), , , 4x Daily AC and at bedtime, Jeannie Braswell MD    levalbuterol (XOPENEX) inhalation solution 0.63 mg, 0.63 mg, Nebulization, Q6H PRN, Jeannie Braswell MD, 0.63 mg at 04/17/25 1758    lidocaine (LIDODERM) 5 % patch 1 patch, 1 patch, Topical, Daily, Jeannie Braswell MD, 1 patch at 04/20/25 0906    [Held by provider] melatonin tablet 3 mg, 3 mg, Oral, HS, Jeannie Braswell MD    metoprolol succinate (TOPROL-XL) 24 hr tablet 25 mg, 25 mg, Oral, BID, Rayo Nascimento DO, 25 mg at 04/20/25 0907    midodrine (PROAMATINE) tablet 10 mg, 10 mg, Oral, Before Dialysis, Jeannie Braswell MD, 10 mg at 04/18/25 1304    prasugrel (EFFIENT) tablet 10 mg, 10 mg, Oral, Daily, Jeannie Braswell MD, 10 mg at 04/19/25 0915    [Held by provider] sacubitril-valsartan (ENTRESTO) 24-26 MG per tablet 1 tablet, 1 tablet, Oral, BID, Jeannie Braswell MD      Lab Results: I have reviewed the following results:  Results from last 7 days   Lab Units 04/20/25  0637 04/19/25  0627 04/18/25  2047 04/18/25  0446 04/17/25  1440 04/17/25  1419 04/17/25  1348 04/17/25  1347 04/17/25  1020   WBC Thousand/uL 6.67 6.70 6.79 4.65  --   --   --  5.07 6.09   HEMOGLOBIN g/dL 11.3* 11.6* 11.3* 10.9*  --   --   --  9.5* 10.0*   I STAT HEMOGLOBIN g/dl  --   --   --   --  10.2* 9.9* 10.5*  --   --    HEMATOCRIT % 35.7* 37.2 36.0* 34.7*  --   --   --  31.3* 33.1*   HEMATOCRIT, ISTAT %  --   --   --   --  30* 29* 31*  --   --    PLATELETS Thousands/uL 143* 122* 119* 106*  --   --   --  108* 105*   POTASSIUM mmol/L 4.9 4.6 4.6 5.2  --   --   --  4.3  --    CHLORIDE mmol/L 95* 96 96 95*  --   --   --  96  --    CO2 mmol/L 27 27 28 27  --   --   --  32  --    CO2, I-STAT mmol/L  --   --   --   --  21 31 33*  --   --    BUN mg/dL 50* 36* 28* 33*  --   --   --  25  --    CREATININE mg/dL 6.22* 4.92* 4.36* 5.30*  --   --   --  4.62*  --    CALCIUM mg/dL 7.8* 8.0* 8.0* 7.8*  --   --   --   "8.0*  --    MAGNESIUM mg/dL 2.1 2.0 2.0 2.0  --   --   --   --   --    PHOSPHORUS mg/dL 5.9* 5.6* 5.5* 6.3*  --   --   --   --   --    ALBUMIN g/dL  --   --   --  3.7  --   --   --  3.8  --    GLUCOSE, ISTAT mg/dl  --   --   --   --  67 79 89  --   --        Administrative Statements     Portions of the record may have been created with voice recognition software. Occasional wrong word or \"sound a like\" substitutions may have occurred due to the inherent limitations of voice recognition software. Read the chart carefully and recognize, using context, where substitutions have occurred.If you have any questions, please contact the dictating provider.  "

## 2025-04-20 NOTE — ASSESSMENT & PLAN NOTE
-Is on Hectorol 2 mcg 3 times a week with dialysis?  - Sensipar was recently discontinued per prior notes?  - Phosphorus binders were recently held at [facility].  Trend phosphorus for now.  May need to consider restarting phosphorus binders  - Per review of outpatient dialysis note on 4/16-patient was on Sensipar to 90 mg but there is not mention of Hectorol.  There was concern with compliance with Renvela.

## 2025-04-20 NOTE — ASSESSMENT & PLAN NOTE
-Outpatient dialysis [facility]  - Outpatient schedule MWF and patient had dialysis yesterday  - Left upper extremity aneurysmal fistula without impending signs of rupture on exam with good thrill and bruit  - Per patient's wife patient had dialysis yesterday  -4/17-sodium, potassium, bicarbonate are appropriate.  Calcium was 8.  Troponin being trended.  Hemoglobin at 9.5.  Stable.  -4/18-potassium 5.2.  Sodium, bicarbonate appropriate.  Phosphorus 6.3.  Hemoglobin above goal 10.9.  Had hypotension in the evening requiring albumin and hypotension has resolved.  - 4/19-sodium, potassium, bicarbonate, magnesium are appropriate.  Phosphorus stable 5.6.  No urgent dialysis need  - 4/19-sodium, potassium, bicarbonate appropriate.  Phosphorus elevated 5.9.  Cardiology team reintroducing beta-blocker today.  As blood pressures are remaining stable     Plan  - Agree with holding Entresto for now  - Noted cardiology team is reducing metoprolol today  - Dialysis Monday from renal standpoint  - Continue midodrine with dialysis  - Reviewed case primary team resident we are in agreement with dialysis Monday  - Will need to clarify outpatient regimen regarding Sensipar but per notes it appears that he was on 90 mg 3 times a week and does not appear to have been on Hectorol.  Will need to call dialysis unit on Monday to confirm and will also need to confirm phosphorus binders as the note states that the patient may not have been compliant?

## 2025-04-20 NOTE — SPEECH THERAPY NOTE
Speech Language/Pathology    Speech/Language Pathology Progress Note    Patient Name: Asad Galloway  Today's Date: 4/20/2025     Problem List  Principal Problem:    Fall  Active Problems:    Mixed hyperlipidemia    Anemia    ESRD on dialysis (HCC)    CAD, multiple vessel    Acute hypoxic respiratory failure (HCC)    Ischemic cardiomyopathy    Aortic stenosis    Acute embolism and thrombosis of right peroneal vein (HCC)    Type 2 diabetes mellitus (HCC)    Chronic kidney disease-mineral and bone disorder    HFrEF (heart failure with reduced ejection fraction) (HCC)    Cirrhosis (HCC)       Past Medical History  Past Medical History:   Diagnosis Date    Cerebrovascular accident (CVA) due to thrombosis of left middle cerebral artery (HCC) 07/29/2018    Chronic kidney disease     Coronary artery disease     Diabetes mellitus (HCC)     not on meds    Dialysis patient (HCC)     M-W-F    Fistula     left upper arm for hemodialyis    GERD (gastroesophageal reflux disease)     History of coronary artery stent placement     x3    Hypercholesteremia     Hyperlipidemia     Hypertension     Infectious viral hepatitis     B as child    Limb alert care status     no BP/IV left arm    Neuropathy     Nonhealing ulcer of heel ([facility]) 04/25/2023    Obesity     Osteomyelitis ([facility])     last assessed 11/4/16-per son not currently    Penetrating foot wound, left, initial encounter 01/19/2022    PVC's (premature ventricular contractions)     sees [facility]    Stroke ([facility])     last weeof July 2018 [facility]    TIA (transient ischemic attack) 10/28/2018    Ulcer of left heel, limited to breakdown of skin ([facility]) 06/13/2024    Wears dentures     Wears glasses         Past Surgical History  Past Surgical History:   Procedure Laterality Date    ABDOMINAL SURGERY      CARDIAC CATHETERIZATION N/A 05/02/2022    Procedure: Cardiac Coronary Angiogram;  Surgeon: Sam Davis MD;  Location: [facility];  Service: Cardiology     "CARDIAC CATHETERIZATION N/A 05/02/2022    Procedure: Cardiac pci;  Surgeon: Sam Davis MD;  [facility]: [facility];  Service: Cardiology    CARDIAC CATHETERIZATION  02/01/2023    Procedure: Cardiac catheterization;  Surgeon: Sam Davis MD;  Location: [facility] CARDIAC [facility];  Service: Cardiology    CARDIAC CATHETERIZATION N/A 02/01/2023    Procedure: Cardiac pci;  Surgeon: Sam Davis MD;  Location: [facility];  Service: Cardiology    CARDIAC CATHETERIZATION N/A 02/01/2023    Procedure: Cardiac Coronary Angiogram;  Surgeon: Sam Davis MD;  Location: [facility];  Service: Cardiology    CARDIAC CATHETERIZATION N/A 02/01/2023    Procedure: Cardiac other-IVUS;  Surgeon: Sam Davis MD;  Location: [facility] CARD[facility]AC [facility];  Service: Cardiology    CHOLECYSTECTOMY      Percutaneous    COLONOSCOPY      CORONARY ANGIOPLASTY WITH STENT PLACEMENT      CYSTOSCOPY      HEMODIALYSIS ADULT  1/22/2024    HEMODIALYSIS ADULT  01/24/2024    IR LOWER EXTREMITY ANGIOGRAM  9/29/2023    IR LOWER EXTREMITY ANGIOGRAM  02/26/2024    OTHER SURGICAL HISTORY      \"stimulator to control bowel movements\"    SD ESOPHAGOGASTRODUODENOSCOPY TRANSORAL DIAGNOSTIC N/A 09/27/2016    Procedure: ESOPHAGOGASTRODUODENOSCOPY (EGD);  Surgeon: Adele Rowe MD;  Location: [facility] GI LAB;  Service: Gastroenterology    SD LAPAROSCOPY SURG CHOLECYSTECTOMY N/A 02/29/2016    Procedure: LAPAROSCOPIC CHOLECYSTECTOMY ;  Surgeon: Cliff Roman DO;  Location: [facility] Main OR;  Service: General    SD SLCTV CATHJ 3RD+ ORD SLCTV ABDL PEL/LXTR BRNCH Left 9/29/2023    Procedure: Left leg angiogram with intervention;  Surgeon: Michelle Galvan MD;  Location: [facility] MAIN OR;  Service: Vascular    SD SLCTV CATHJ 3RD+ ORD SLCTV ABDL PEL/LXTR BRNCH Left 02/26/2024    Procedure: Left leg angiogram antegrade access, left popliteal angioplasty;  Surgeon: Michelle Galvan MD;  Location: [facility] MAIN OR;  Service: Vascular    ROTATOR CUFF REPAIR Right     SPINAL CORD " "STIMULATOR IMPLANT      \"[employer]tronic interstim model # 3058- in lower back to control bowel movements-currently turned off-battery is dead\"    TOE AMPUTATION Right 10/28/2016    Procedure: 3RD TOE AMPUTATION ;  Surgeon: Anjel Salas DPM;  Location: [facility] Main OR;  Service:        Subjective:  Pt alert and upright in bed. Pleasantly confused. Pt now with upper and lower dentures in place (did not have present at IE). Agreeable to skilled  ST at this time. Noted baseline coughing prior to PO intake.    Objective:  Per RN report, pt family bringing in hot dog. Pt agreeable to intake of mashed potato's, hot dog, pretzels, and water. Weak retrieval with hot dog. Prolonged mastication with regular solid- pt spitting out residue despite encouragement for liquid wash. No oral residue noted with small bites of hot dog. Pt requiring mod/max verbal cues for slow rate and small bites due to noted impulsivity with intake. No overt s/s of aspiration with all consistencies.     Assessment:  Pt with no c/o mastication difficulties compared to prior IE  due to having dentures present. No overt s/s of aspiration with all trials. Impulsive with intake requiring cues.     Plan/Recommendations:  Recommend diet upgrade to dysphagia 3 (dental soft)/thin liquids. Aspiration precautions. Direct supervision due to impulsivity with intake. Frequent oral care. Will continue to f/u as able/appropriate.   "

## 2025-04-20 NOTE — ASSESSMENT & PLAN NOTE
-EF 35 to 40% with grade 2 diastolic dysfunction with moderate to severe aortic stenoses, mild to moderate MR, mild to moderate TR  - Patient with known CAD with recent stent placement at [facility] in preparation for TAVR  - Further plans per cardiology team

## 2025-04-21 ENCOUNTER — APPOINTMENT (INPATIENT)
Dept: DIALYSIS | Facility: HOSPITAL | Age: 65
DRG: 314 | End: 2025-04-21
Payer: MEDICARE

## 2025-04-21 PROBLEM — E87.5 HYPERKALEMIA: Status: ACTIVE | Noted: 2025-04-21

## 2025-04-21 LAB
ANION GAP SERPL CALCULATED.3IONS-SCNC: 15 MMOL/L (ref 4–13)
BUN SERPL-MCNC: 54 MG/DL (ref 5–25)
CALCIUM SERPL-MCNC: 8 MG/DL (ref 8.4–10.2)
CHLORIDE SERPL-SCNC: 95 MMOL/L (ref 96–108)
CO2 SERPL-SCNC: 24 MMOL/L (ref 21–32)
CREAT SERPL-MCNC: 7.44 MG/DL (ref 0.6–1.3)
ERYTHROCYTE [DISTWIDTH] IN BLOOD BY AUTOMATED COUNT: 17.2 % (ref 11.6–15.1)
GFR SERPL CREATININE-BSD FRML MDRD: 6 ML/MIN/1.73SQ M
GLUCOSE SERPL-MCNC: 105 MG/DL (ref 65–140)
GLUCOSE SERPL-MCNC: 109 MG/DL (ref 65–140)
GLUCOSE SERPL-MCNC: 113 MG/DL (ref 65–140)
GLUCOSE SERPL-MCNC: 131 MG/DL (ref 65–140)
GLUCOSE SERPL-MCNC: 64 MG/DL (ref 65–140)
GLUCOSE SERPL-MCNC: 71 MG/DL (ref 65–140)
GLUCOSE SERPL-MCNC: 76 MG/DL (ref 65–140)
HCT VFR BLD AUTO: 35.5 % (ref 36.5–49.3)
HGB BLD-MCNC: 10.8 G/DL (ref 12–17)
MAGNESIUM SERPL-MCNC: 2.3 MG/DL (ref 1.9–2.7)
MCH RBC QN AUTO: 31.4 PG (ref 26.8–34.3)
MCHC RBC AUTO-ENTMCNC: 30.4 G/DL (ref 31.4–37.4)
MCV RBC AUTO: 103 FL (ref 82–98)
PHOSPHATE SERPL-MCNC: 6.4 MG/DL (ref 2.3–4.1)
PLATELET # BLD AUTO: 144 THOUSANDS/UL (ref 149–390)
PMV BLD AUTO: 11.3 FL (ref 8.9–12.7)
POTASSIUM SERPL-SCNC: 5.4 MMOL/L (ref 3.5–5.3)
RBC # BLD AUTO: 3.44 MILLION/UL (ref 3.88–5.62)
SODIUM SERPL-SCNC: 134 MMOL/L (ref 135–147)
WBC # BLD AUTO: 7.26 THOUSAND/UL (ref 4.31–10.16)

## 2025-04-21 PROCEDURE — 83735 ASSAY OF MAGNESIUM: CPT | Performed by: INTERNAL MEDICINE

## 2025-04-21 PROCEDURE — 5A1D70Z PERFORMANCE OF URINARY FILTRATION, INTERMITTENT, LESS THAN 6 HOURS PER DAY: ICD-10-PCS | Performed by: INTERNAL MEDICINE

## 2025-04-21 PROCEDURE — 99232 SBSQ HOSP IP/OBS MODERATE 35: CPT | Performed by: INTERNAL MEDICINE

## 2025-04-21 PROCEDURE — 85027 COMPLETE CBC AUTOMATED: CPT | Performed by: INTERNAL MEDICINE

## 2025-04-21 PROCEDURE — 84100 ASSAY OF PHOSPHORUS: CPT | Performed by: INTERNAL MEDICINE

## 2025-04-21 PROCEDURE — 80048 BASIC METABOLIC PNL TOTAL CA: CPT | Performed by: INTERNAL MEDICINE

## 2025-04-21 PROCEDURE — 82948 REAGENT STRIP/BLOOD GLUCOSE: CPT

## 2025-04-21 RX ORDER — ALBUMIN HUMAN 50 G/1000ML
12.5 SOLUTION INTRAVENOUS ONCE
Status: COMPLETED | OUTPATIENT
Start: 2025-04-21 | End: 2025-04-21

## 2025-04-21 RX ORDER — MIDODRINE HYDROCHLORIDE 5 MG/1
10 TABLET ORAL ONCE
Status: COMPLETED | OUTPATIENT
Start: 2025-04-21 | End: 2025-04-21

## 2025-04-21 RX ORDER — DOXERCALCIFEROL 4 UG/2ML
2 INJECTION INTRAVENOUS
Status: DISCONTINUED | OUTPATIENT
Start: 2025-04-21 | End: 2025-04-24 | Stop reason: HOSPADM

## 2025-04-21 RX ORDER — ALBUMIN (HUMAN) 12.5 G/50ML
25 SOLUTION INTRAVENOUS ONCE
Status: COMPLETED | OUTPATIENT
Start: 2025-04-21 | End: 2025-04-21

## 2025-04-21 RX ADMIN — ALBUMIN (HUMAN) 12.5 G: 12.5 INJECTION, SOLUTION INTRAVENOUS at 01:23

## 2025-04-21 RX ADMIN — ATORVASTATIN CALCIUM 40 MG: 40 TABLET, FILM COATED ORAL at 17:31

## 2025-04-21 RX ADMIN — APIXABAN 2.5 MG: 2.5 TABLET, FILM COATED ORAL at 17:31

## 2025-04-21 RX ADMIN — ALBUMIN (HUMAN) 25 G: 0.25 INJECTION, SOLUTION INTRAVENOUS at 02:19

## 2025-04-21 RX ADMIN — CYANOCOBALAMIN TAB 500 MCG 1000 MCG: 500 TAB at 09:13

## 2025-04-21 RX ADMIN — METOPROLOL SUCCINATE 25 MG: 25 TABLET, EXTENDED RELEASE ORAL at 09:13

## 2025-04-21 RX ADMIN — TRIMETHOBENZAMIDE HYDROCHLORIDE 200 MG: 100 INJECTION INTRAMUSCULAR at 12:56

## 2025-04-21 RX ADMIN — MIDODRINE HYDROCHLORIDE 10 MG: 5 TABLET ORAL at 12:17

## 2025-04-21 RX ADMIN — APIXABAN 2.5 MG: 2.5 TABLET, FILM COATED ORAL at 09:13

## 2025-04-21 RX ADMIN — PRASUGREL 10 MG: 10 TABLET, FILM COATED ORAL at 09:13

## 2025-04-21 RX ADMIN — LIDOCAINE 5% 1 PATCH: 700 PATCH TOPICAL at 09:13

## 2025-04-21 RX ADMIN — MIDODRINE HYDROCHLORIDE 10 MG: 5 TABLET ORAL at 02:19

## 2025-04-21 RX ADMIN — SEVELAMER HYDROCHLORIDE 2400 MG: 800 TABLET ORAL at 17:31

## 2025-04-21 NOTE — ASSESSMENT & PLAN NOTE
Wt Readings from Last 3 Encounters:   04/20/25 101 kg (222 lb 10.6 oz)   04/09/25 100 kg (221 lb)   04/01/25 97.1 kg (214 lb)     Combined systolic and diastolic HF, [facility]  Last echo March 2025- Ef 35-40% with grade III DD, global hypokinesis, severely LA enlargement  Medications;Toprol-XL 25 twice daily, Entresto 24-26 twice daily with Volume management: Dialysis  For this admission volume up with concern for cardiogenic shock     BNP >4700  Trop 130/ 117/107  CXR Mild pulmonary venous congestion with trace pleural effusions.   4/17 echo EF 20%, severe global hypokinesis, ungraded diastolic dysfunction, severe RV dilation moderate AAS, moderate MR moderate TR  Plan   Volume per Dialysis  Resume home medications as able  Cardiology consulted for new reduction in EF, recommendations appreciated  [facility] assessment completed 4/19, to be set up before discharge

## 2025-04-21 NOTE — PLAN OF CARE
Pt. On HD treatment for 4 hours with a UF goal of 500 ml. Pt on a 2 k 2.5 rakan bath for a potassium of 5.4 on 04/21/25. Plan of care discussed at bedside with Dr. Velásquez  Problem: METABOLIC, FLUID AND ELECTROLYTES - ADULT  Goal: Electrolytes maintained within normal limits  Description: INTERVENTIONS:- Monitor labs and assess patient for signs and symptoms of electrolyte imbalances- Administer electrolyte replacement as ordered- Monitor response to electrolyte replacements, including repeat lab results as appropriate- Instruct patient on fluid and nutrition as appropriate  Outcome: Progressing  Goal: Fluid balance maintained  Description: INTERVENTIONS:- Monitor labs - Monitor I/O and WT- Instruct patient on fluid and nutrition as appropriate- Assess for signs & symptoms of volume excess or deficit  Outcome: Progressing

## 2025-04-21 NOTE — PLAN OF CARE
Problem: Prexisting or High Potential for Compromised Skin Integrity  Goal: Skin integrity is maintained or improved  Description: INTERVENTIONS:- Identify patients at risk for skin breakdown- Assess and monitor skin integrity- Assess and monitor nutrition and hydration status- Monitor labs - Assess for incontinence - Turn and reposition patient- Assist with mobility/ambulation- Relieve pressure over bony prominences- Avoid friction and shearing- Provide appropriate hygiene as needed including keeping skin clean and dry- Evaluate need for skin moisturizer/barrier cream- Collaborate with interdisciplinary team - Patient/family teaching- Consider wound care consult   Outcome: Progressing     Problem: PAIN - ADULT  Goal: Verbalizes/displays adequate comfort level or baseline comfort level  Description: Interventions:- Encourage patient to monitor pain and request assistance- Assess pain using appropriate pain scale- Administer analgesics based on type and severity of pain and evaluate response- Implement non-pharmacological measures as appropriate and evaluate response- Consider cultural and social influences on pain and pain management- Notify physician/advanced practitioner if interventions unsuccessful or patient reports new pain  Outcome: Progressing     Problem: INFECTION - ADULT  Goal: Absence or prevention of progression during hospitalization  Description: INTERVENTIONS:- Assess and monitor for signs and symptoms of infection- Monitor lab/diagnostic results- Monitor all insertion sites, i.e. indwelling lines, tubes, and drains- Monitor endotracheal if appropriate and nasal secretions for changes in amount and color- Bivalve appropriate cooling/warming therapies per order- Administer medications as ordered- Instruct and encourage patient and family to use good hand hygiene technique- Identify and instruct in appropriate isolation precautions for identified infection/condition  Outcome: Progressing  Goal:  Absence of fever/infection during neutropenic period  Description: INTERVENTIONS:- Monitor WBC  Outcome: Progressing     Problem: SAFETY ADULT  Goal: Patient will remain free of falls  Description: INTERVENTIONS:- Educate patient/family on patient safety including physical limitations- Instruct patient to call for assistance with activity - Consult OT/PT to assist with strengthening/mobility - Keep Call bell within reach- Keep bed low and locked with side rails adjusted as appropriate- Keep care items and personal belongings within reach- Initiate and maintain comfort rounds- Make Fall Risk Sign visible to staff- Offer Toileting every 2 Hours, in advance of need- Initiate/Maintain bed/chair alarm- Obtain necessary fall risk management equipment:   Problem: CARDIOVASCULAR - ADULT  Goal: Maintains optimal cardiac output and hemodynamic stability  Description: INTERVENTIONS:- Monitor I/O, vital signs and rhythm- Monitor for S/S and trends of decreased cardiac output- Administer and titrate ordered vasoactive medications to optimize hemodynamic stability- Assess quality of pulses, skin color and temperature- Assess for signs of decreased coronary artery perfusion- Instruct patient to report change in severity of symptoms  Outcome: Progressing  Goal: Absence of cardiac dysrhythmias or at baseline rhythm  Description: INTERVENTIONS:- Continuous cardiac monitoring, vital signs, obtain 12 lead EKG if ordered- Administer antiarrhythmic and heart rate control medications as ordered- Monitor electrolytes and administer replacement therapy as ordered  Outcome: Progressing   - Apply yellow socks and bracelet for high fall risk patients- Consider moving patient to room near nurses station  Outcome: Progressing     Problem: DISCHARGE PLANNING  Goal: Discharge to home or other facility with appropriate resources  Description: INTERVENTIONS:- Identify barriers to discharge w/patient and caregiver- Arrange for needed discharge  resources and transportation as appropriate- Identify discharge learning needs (meds, wound care, etc.)- Arrange for interpretive services to assist at discharge as needed- Refer to Case Management Department for coordinating discharge planning if the patient needs post-hospital services based on physician/advanced practitioner order or complex needs related to functional status, cognitive ability, or social support system  Outcome: Progressing     Problem: Knowledge Deficit  Goal: Patient/family/caregiver demonstrates understanding of disease process, treatment plan, medications, and discharge instructions  Description: Complete learning assessment and assess knowledge base.Interventions:- Provide teaching at level of understanding- Provide teaching via preferred learning methods  Outcome: Progressing     Problem: METABOLIC, FLUID AND ELECTROLYTES - ADULT  Goal: Electrolytes maintained within normal limits  Description: INTERVENTIONS:- Monitor labs and assess patient for signs and symptoms of electrolyte imbalances- Administer electrolyte replacement as ordered- Monitor response to electrolyte replacements, including repeat lab results as appropriate- Instruct patient on fluid and nutrition as appropriate  Outcome: Progressing  Goal: Fluid balance maintained  Description: INTERVENTIONS:- Monitor labs - Monitor I/O and WT- Instruct patient on fluid and nutrition as appropriate- Assess for signs & symptoms of volume excess or deficit  Outcome: Progressing     Problem: Nutrition/Hydration-ADULT  Goal: Nutrient/Hydration intake appropriate for improving, restoring or maintaining nutritional needs  Description: Monitor and assess patient's nutrition/hydration status for malnutrition. Collaborate with interdisciplinary team and initiate plan and interventions as ordered.  Monitor patient's weight and dietary intake as ordered or per policy. Utilize nutrition screening tool and intervene as necessary. Determine patient's  food preferences and provide high-protein, high-caloric foods as appropriate. INTERVENTIONS:- Monitor oral intake, urinary output, labs, and treatment plans- Assess nutrition and hydration status and recommend course of action- Evaluate amount of meals eaten- Assist patient with eating if necessary - Allow adequate time for meals- Recommend/ encourage appropriate diets, oral nutritional supplements, and vitamin/mineral supplements- Order, calculate, and assess calorie counts as needed- Recommend, monitor, and adjust tube feedings and TPN/PPN based on assessed needs- Assess need for intravenous fluids- Provide specific nutrition/hydration education as appropriate- Include patient/family/caregiver in decisions related to nutrition  Outcome: Progressing

## 2025-04-21 NOTE — ASSESSMENT & PLAN NOTE
Lab Results   Component Value Date    EGFR 6 04/21/2025    EGFR 8 04/20/2025    EGFR 11 04/19/2025    CREATININE 7.44 (H) 04/21/2025    CREATININE 6.22 (H) 04/20/2025    CREATININE 4.92 (H) 04/19/2025     Calcium and magnesium stable  hx hyperphosphatemia.  Binders recently discontinued during recent hospitalization at [facility].  Continue low phosphorus diet  Secondary hyperparathyroidism of renal origin: On Hectorol 2 mcg 3 times a week with HD and Sensipar 90 mg 3 times a week with HD  Continue to monitor and manage as outpatient

## 2025-04-21 NOTE — ASSESSMENT & PLAN NOTE
CAD S/P multiple stents with history over the past years, most recently in 2025 2/27/2025 [facility]: Mid circumflex 10%, first obtuse marginal 100%, first %, severe in-stent restenosis of LAD and  of OM1 ([facility])  3/17/2025 [facility] LM normal, proximal LAD 90%, circumflex luminal irregularities, first obtuse marginal 100%, right system not described, successful balloon angioplasty to proximal LAD with 0% residual stenosis ([facility])  He is on ASA, effient, statin  Resume home meds as able

## 2025-04-21 NOTE — ASSESSMENT & PLAN NOTE
OP Rx: He is on Hectorol 2 mcg and Sensipar 90 mg for 2HPT with HD.   OP Rx: He is on Renvela for hyperphos.   Phos is above goal - restart Sevelamer.   Restart Hectorol 2 mcg with HD.   Hold off on Sensipar due to hypocalcemia.

## 2025-04-21 NOTE — ASSESSMENT & PLAN NOTE
Echo 4/17/25 EF 20%, abnormal diastolic function, AS, AI.   Cardiology following.   BP is low limiting UF on HD.   Entresto being held.   He is on Metoprolol succ 25 mg BID.   Continue Midodrine 10 mg before HD.

## 2025-04-21 NOTE — PROGRESS NOTES
NEPHROLOGY HOSPITAL PROGRESS NOTE   Asad Galloway 65 y.o. male MRN: [id number]  Unit/Bed#: S -01 Encounter: [id number]  Reason for Consult: ESRD on HD.     Assessment & Plan  ESRD on dialysis ([facility])  [facility]  Access: Left arm AVF.  EDW 96 kg - although wife says it is 95 kg.   Plan for HD today.   PreHD weight is 96.1 kg and BP is on the low side.   Will plan for 0.5 kg UF. If BP goes down, will run even.     Fall  Defer to primary service.   Patient attempted to walk without his walker which led to a fall.   Low BP probably contributing.     HFrEF (heart failure with reduced ejection fraction) ([facility])  Echo 4/17/25 EF 20%, abnormal diastolic function, AS, AI.   Cardiology following.   BP is low limiting UF on HD.   Entresto being held.   He is on Metoprolol succ 25 mg BID.   Continue Midodrine 10 mg before HD.     Anemia  Hgb is at goal.   He is on Mircera in the outpatient setting.     Chronic kidney disease-mineral and bone disorder  OP Rx: He is on Hectorol 2 mcg and Sensipar 90 mg for 2HPT with HD.   OP Rx: He is on Renvela for hyperphos.   Phos is above goal - restart Sevelamer.   Restart Hectorol 2 mcg with HD.   Hold off on Sensipar due to hypocalcemia.     Acute embolism and thrombosis of right peroneal vein ([facility])  Defer to [facility].     Aortic stenosis  Followed at [place]  Scheduled for TAVR at [place] mid April 2025.     Acute hypoxic respiratory failure (HCC)  Now improved.     Hyperkalemia  K 5.4 today.   Plan for 2 K bath.       TODAY's DISCUSSION / PLAN:  HD today.   Plan for 0.5 kg UF on HD   UF is limited by low BP despite Midodrine.   Restart Sevelamer 2400 mg TID.   Stop Sensipar  Restart Hectorol.   Continue to hold Entresto.     SUBJECTIVE / 24H INTERVAL HISTORY:  BP was better this AM but low again before starting HD.   No CP or SOB.   Wife at bedside.     OBJECTIVE:  Current Weight: Weight - Scale: 101 kg (222 lb 10.6 oz)  Vitals:    04/21/25 0237 04/21/25 0538 04/21/25 0739 04/21/25  1203   BP: 111/50 103/50 99/55 97/55   Pulse: 76 70 70 67   Resp:   18    Temp:       TempSrc:       SpO2: 96% 96% 98% 95%   Weight:       Height:           Intake/Output Summary (Last 24 hours) at 4/21/2025 1325  Last data filed at 4/21/2025 0601  Gross per 24 hour   Intake 631 ml   Output 0 ml   Net 631 ml     General: conscious, cooperative, no distress  Skin: dry  Eyes: pink conjunctivae  ENT: moist mucous membranes  Respiratory: equal chest expansion, clear breath sounds.  Cardiovascular: distinct heart sounds, normal rate, regular rhythm, no rub  Abdomen: soft, non tender, non distended, normal bowel sounds  Extremities: no edema.   Genitourinary: no chávez catheter.   Neuro: awake, alert.   Psych: anxious    Medications:    Current Facility-Administered Medications:     acetaminophen (TYLENOL) tablet 650 mg, 650 mg, Oral, Q6H PRN, Edward Hanna MD, 650 mg at 04/20/25 0907    apixaban (ELIQUIS) tablet 2.5 mg, 2.5 mg, Oral, BID, Jeannie Braswell MD, 2.5 mg at 04/21/25 0913    atorvastatin (LIPITOR) tablet 40 mg, 40 mg, Oral, Daily With Dinner, Jeannie Braswell MD, 40 mg at 04/20/25 1741    chlorhexidine (PERIDEX) 0.12 % oral rinse 15 mL, 15 mL, Mouth/Throat, Q12H MICH, Jeannie Braswell MD, 15 mL at 04/20/25 0906    [Held by provider] Cholecalciferol (VITAMIN D3) tablet 1,000 Units, 1,000 Units, Oral, Daily, Jeannie Braswell MD    [Held by provider] cinacalcet (SENSIPAR) tablet 30 mg, 30 mg, Oral, Daily With Breakfast, Jeannie Braswell MD    cyanocobalamin (VITAMIN B-12) tablet 1,000 mcg, 1,000 mcg, Oral, Daily, Aniyah Guerrero DO, 1,000 mcg at 04/21/25 0913    [Transfer Hold] guaiFENesin (ROBITUSSIN) oral liquid 200 mg, 200 mg, Oral, Q4H PRN, BRITTANY Wellington, 200 mg at 04/18/25 1732    insulin lispro (HumALOG/ADMELOG) 100 units/mL subcutaneous injection 1-6 Units, 1-6 Units, Subcutaneous, 4x Daily (PC & HS), 1 Units at 04/20/25 1747 **AND** Fingerstick Glucose (POCT), , , 4x Daily AC and at bedtime, Jeannie Braswell,  MD    levalbuterol (XOPENEX) inhalation solution 0.63 mg, 0.63 mg, Nebulization, Q6H PRN, Jeannie Braswell MD, 0.63 mg at 04/17/25 1758    lidocaine (LIDODERM) 5 % patch 1 patch, 1 patch, Topical, Daily, Jeannie Braswell MD, 1 patch at 04/21/25 0913    [Held by provider] melatonin tablet 3 mg, 3 mg, Oral, HS, Jeannie Braswell MD    metoprolol succinate (TOPROL-XL) 24 hr tablet 25 mg, 25 mg, Oral, BID, Rayo Nascimento DO, 25 mg at 04/21/25 0913    midodrine (PROAMATINE) tablet 10 mg, 10 mg, Oral, Before Dialysis, Jeannie Braswell MD, 10 mg at 04/21/25 1217    prasugrel (EFFIENT) tablet 10 mg, 10 mg, Oral, Daily, Jeannie Braswell MD, 10 mg at 04/21/25 0913    [Held by provider] sacubitril-valsartan (ENTRESTO) 24-26 MG per tablet 1 tablet, 1 tablet, Oral, BID, Jeannie Braswell MD    trimethobenzamide (TIGAN) IM injection 200 mg, 200 mg, Intramuscular, Q6H PRN, Edward Hanna MD, 200 mg at 04/21/25 1256    Laboratory Results:  Results from last 7 days   Lab Units 04/21/25  0557 04/20/25  0637 04/19/25  0627 04/18/25  2047 04/18/25  0446 04/17/25  1440 04/17/25  1419 04/17/25  1348 04/17/25  1348 04/17/25  1347 04/17/25  1020   WBC Thousand/uL 7.26 6.67 6.70 6.79 4.65  --   --   --   --  5.07 6.09   HEMOGLOBIN g/dL 10.8* 11.3* 11.6* 11.3* 10.9*  --   --   --   --  9.5* 10.0*   I STAT HEMOGLOBIN g/dl  --   --   --   --   --  10.2* 9.9*   < > 10.5*  --   --    HEMATOCRIT % 35.5* 35.7* 37.2 36.0* 34.7*  --   --   --   --  31.3* 33.1*   HEMATOCRIT, ISTAT %  --   --   --   --   --  30* 29*   < > 31*  --   --    PLATELETS Thousands/uL 144* 143* 122* 119* 106*  --   --   --   --  108* 105*   POTASSIUM mmol/L 5.4* 4.9 4.6 4.6 5.2  --   --   --   --  4.3  --    CHLORIDE mmol/L 95* 95* 96 96 95*  --   --   --   --  96  --    CO2 mmol/L 24 27 27 28 27  --   --   --   --  32  --    CO2, I-STAT mmol/L  --   --   --   --   --  21 31   < > 33*  --   --    BUN mg/dL 54* 50* 36* 28* 33*  --   --   --   --  25  --    CREATININE mg/dL 7.44* 6.22*  "4.92* 4.36* 5.30*  --   --   --   --  4.62*  --    CALCIUM mg/dL 8.0* 7.8* 8.0* 8.0* 7.8*  --   --   --   --  8.0*  --    MAGNESIUM mg/dL 2.3 2.1 2.0 2.0 2.0  --   --   --   --   --   --    PHOSPHORUS mg/dL 6.4* 5.9* 5.6* 5.5* 6.3*  --   --   --   --   --   --    GLUCOSE, ISTAT mg/dl  --   --   --   --   --  67 79  --  89  --   --     < > = values in this interval not displayed.     Portions of the record may have been created with voice recognition software. Occasional wrong word or \"sound a like\" substitutions may have occurred due to the inherent limitations of voice recognition software. Read the chart carefully and recognize, using context, where substitutions have occurred.If you have any questions, please contact the dictating provider.    "

## 2025-04-21 NOTE — ASSESSMENT & PLAN NOTE
Recent Labs     04/19/25  0627 04/20/25  0637 04/21/25  0557   K 4.6 4.9 5.4*   MG 2.0 2.1 2.3     Elevated 4/21, will repeat BMP later today   On dialysis   Entresto currently held

## 2025-04-21 NOTE — SPEECH THERAPY NOTE
Speech Language/Pathology Cancel Note  Attempted SLP tx at this time. Pt currently at dialysis. Will continue to f/u as able/appropriate.

## 2025-04-21 NOTE — ASSESSMENT & PLAN NOTE
Lab Results   Component Value Date    EGFR 6 04/21/2025    EGFR 8 04/20/2025    EGFR 11 04/19/2025    CREATININE 7.44 (H) 04/21/2025    CREATININE 6.22 (H) 04/20/2025    CREATININE 4.92 (H) 04/19/2025     MWF schedule at [facility]  Nephrology consulted appreciate recommendations

## 2025-04-21 NOTE — QUICK NOTE
Following patient throughout the evening for asymptotic hypotension. (Lowest reading 80/50) Patient not in any distress. No signs of bleed. Per nursing staff at baseline mental status. Given 5% albuimin 12.5 grams. Did not improve hypotension.    Then given 25g of 25 percent albumin + midodrine 10mg    Most recent /50   Resting comfortably in bed without symptoms  Will continue to follow     Todd Brambila, DO  PGY-2 Family Medicine

## 2025-04-21 NOTE — HEMODIALYSIS
Post-Dialysis RN Treatment Note    Blood Pressure:  Pre 94/45 mm/Hg  Post 90/44 mmHg   EDW:  96 kg    Weight:  Pre 96.1 kg   Post 96.1 kg   Mode of weight measurement: Bed Scale   Volume Removed:  0 ml    Treatment duration: 105 minutes    NS given:  No    Treatment shortened Yes, describe: 1 hr 45 mins of tx, pt infiltrated arterial needle   Medications given during Rx: None Reported   Estimated Kt/V:  None Reported   Access type: AV fistula   Needle Gauge:  15/G   Access Issues: No    Report called to primary nurse:   Yes

## 2025-04-21 NOTE — ASSESSMENT & PLAN NOTE
Lab Results   Component Value Date    HGBA1C 5.4 04/10/2025       Recent Labs     04/20/25  1132 04/20/25  1534 04/20/25  2110 04/21/25  0742   POCGLU 207* 154* 102 76       Blood Sugar Average: Last 72 hrs:  (P) [id number]    Controlled  Will continue SSI  Hypoglycemia protocol

## 2025-04-21 NOTE — PROGRESS NOTES
"Cardiology Progress Note - Asad Galloway 65 y.o. male MRN: [id number]    Unit/Bed#: S -01 Encounter: [id number]    Assessment and plan  #1 fall likely due to low blood pressure  #2 multivessel CAD with recent intervention at [facility]  #3 end-stage renal disease on dialysis  #4 acute hypoxemic respiratory failure  #5 ischemic cardiomyopathy ejection fraction my review of 30 to 35%  #6 moderate aortic stenosis and insufficiency  #7 mitral regurgitation  #8 thrombosis of right peroneal vein on Eliquis  #9 anemia  #10 chronic heart failure with reduced ejection fraction      Recommendations: I suspect initial fall was due to hypotension.  Blood pressures have been improving.  He received 2 units of blood which likely caused some flash pulmonary edema.  Volume management on dialysis.    Low BP overnight but doing better this am.  No room for entresto.  Continue current meds and watch BP on HD today.      Subjective:    No significant events overnight.  Denies CP, SOB, palps.  No events on tele  ROS    Objective:   Vitals: Blood pressure 99/55, pulse 70, temperature 98.1 °F (36.7 °C), temperature source Oral, resp. rate 18, height 5' 9\" (1.753 m), weight 101 kg (222 lb 10.6 oz), SpO2 98%., Body mass index is 32.88 kg/m².,   Orthostatic Blood Pressures      Flowsheet Row Most Recent Value   Blood Pressure 99/55 filed at 2025 0739   Patient Position - Orthostatic VS Lying filed at 2025 2139           Systolic (24hrs), Av , Min:80 , Max:111     Diastolic (24hrs), Av, Min:38, Max:60      Intake/Output Summary (Last 24 hours) at 2025 0851  Last data filed at 2025 0601  Gross per 24 hour   Intake 931 ml   Output 0 ml   Net 931 ml     Weight (last 2 days)       Date/Time Weight    25 0600 101 (222.66)    25 0600 101 (221.78)              Telemetry Review: No significant arrhythmias seen on telemetry review.   EKG personally reviewed by Rayo Nascimento DO.     Physical Exam:  " Vital signs reviewed  General:  Alert and cooperative, appears stated age, no acute distress  HEENT:  PERRLA, EOMI, no scleral icterus, no conjunctival pallor  Neck:  No lymphadenopathy, no thyromegaly, no carotid bruits, no elevated JVP  Heart:  Regular rate and rhythm, normal S1/S2, no S3/S4, no murmur, rubs or gallops.  PMI nondisplaced  Lungs:  Clear to auscultation bilaterally, no wheezes rales or rhonchi  Abdomen:  Soft, non-tender, positive bowel sounds, no rebound or guarding,   no organomegaly   Extremities:  Normal range of motion.  No clubbing, cyanosis or edema   Vascular:  2+ pedal pulses  Skin:  No rashes or lesions on exposed skin  Neurologic:  Cranial nerves II-XII grossly intact without focal deficits  Psych:  Normal mood and affect          Laboratory Results:        CBC with diff:   Results from last 7 days   Lab Units 04/21/25  0557 04/20/25  0637 04/19/25  0627 04/18/25  2047 04/18/25  0446 04/17/25  1440 04/17/25  1419 04/17/25  1348 04/17/25  1347 04/17/25  1020   WBC Thousand/uL 7.26 6.67 6.70 6.79 4.65  --   --   --  5.07 6.09   HEMOGLOBIN g/dL 10.8* 11.3* 11.6* 11.3* 10.9*  --   --   --  9.5* 10.0*   I STAT HEMOGLOBIN g/dl  --   --   --   --   --  10.2* 9.9*   < >  --   --    HEMATOCRIT % 35.5* 35.7* 37.2 36.0* 34.7*  --   --   --  31.3* 33.1*   HEMATOCRIT, ISTAT %  --   --   --   --   --  30* 29*   < >  --   --    MCV fL 103* 99* 102* 99* 100*  --   --   --  104* 106*   PLATELETS Thousands/uL 144* 143* 122* 119* 106*  --   --   --  108* 105*   RBC Million/uL 3.44* 3.59* 3.65* 3.63* 3.48*  --   --   --  3.01* 3.12*   MCH pg 31.4 31.5 31.8 31.1 31.3  --   --   --  31.6 32.1   MCHC g/dL 30.4* 31.7 31.2* 31.4 31.4  --   --   --  30.4* 30.2*   RDW % 17.2* 17.6* 18.2* 18.3* 18.8*  --   --   --  17.7* 17.6*   MPV fL 11.3 10.8 11.6 11.1 11.7  --   --   --  11.9 12.5   NRBC AUTO /100 WBCs  --  0  --   --  0  --   --   --  0 0    < > = values in this interval not displayed.         CMP:  Results  "from last 7 days   Lab Units 04/21/25  0557 04/20/25  0637 04/19/25 0627 04/18/25 2047 04/18/25 0446 04/17/25 1440 04/17/25 1419 04/17/25 1348 04/17/25 1348 04/17/25  1347   POTASSIUM mmol/L 5.4* 4.9 4.6 4.6 5.2  --   --   --   --  4.3   CHLORIDE mmol/L 95* 95* 96 96 95*  --   --   --   --  96   CO2 mmol/L 24 27 27 28 27  --   --   --   --  32   CO2, I-STAT mmol/L  --   --   --   --   --  21 31   < > 33*  --    BUN mg/dL 54* 50* 36* 28* 33*  --   --   --   --  25   CREATININE mg/dL 7.44* 6.22* 4.92* 4.36* 5.30*  --   --   --   --  4.62*   GLUCOSE, ISTAT mg/dl  --   --   --   --   --  67 79  --  89  --    CALCIUM mg/dL 8.0* 7.8* 8.0* 8.0* 7.8*  --   --   --   --  8.0*   AST U/L  --   --   --   --  13  --   --   --   --  13   ALT U/L  --   --   --   --  9  --   --   --   --  9   ALK PHOS U/L  --   --   --   --  130*  --   --   --   --  126*   EGFR ml/min/1.73sq m 6 8 11 13 10  --   --   --   --  12    < > = values in this interval not displayed.         BMP:  Results from last 7 days   Lab Units 04/21/25  0557 04/20/25 0637 04/19/25 0627 04/18/25 2047 04/18/25 0446 04/17/25 1440 04/17/25 1419 04/17/25  1348 04/17/25  1347   POTASSIUM mmol/L 5.4* 4.9 4.6 4.6 5.2  --   --   --  4.3   CHLORIDE mmol/L 95* 95* 96 96 95*  --   --   --  96   CO2 mmol/L 24 27 27 28 27  --   --   --  32   CO2, I-STAT mmol/L  --   --   --   --   --  21 31   < >  --    BUN mg/dL 54* 50* 36* 28* 33*  --   --   --  25   CREATININE mg/dL 7.44* 6.22* 4.92* 4.36* 5.30*  --   --   --  4.62*   GLUCOSE, ISTAT mg/dl  --   --   --   --   --  67 79   < >  --    CALCIUM mg/dL 8.0* 7.8* 8.0* 8.0* 7.8*  --   --   --  8.0*    < > = values in this interval not displayed.       BNP:   No results for input(s): \"BNP\" in the last 72 hours.      Magnesium:   Results from last 7 days   Lab Units 04/21/25  0557 04/20/25  0637 04/19/25  0627 04/18/25  2047 04/18/25  0446   MAGNESIUM mg/dL 2.3 2.1 2.0 2.0 2.0       Coags:   Results from last 7 days   Lab " Units 25  0446 25  1347   PTT seconds  --  38*   INR  1.35* 1.50*       TSH:        Hemoglobin A1C       Lipid Profile:       Cardiac testing:   No results found for this or any previous visit.    No results found for this or any previous visit.    No results found for this or any previous visit.    No results found for this or any previous visit.      Meds/Allergies   all current active meds have been reviewed    Medications Prior to Admission:     apixaban (Eliquis) 2.5 mg    aspirin 81 mg chewable tablet    atorvastatin (LIPITOR) 40 mg tablet    cinacalcet (SENSIPAR) 30 mg tablet    [] doxycycline hyclate (VIBRAMYCIN) 100 mg capsule    melatonin 3 mg    metoprolol succinate (TOPROL-XL) 25 mg 24 hr tablet    midodrine (PROAMATINE) 10 MG tablet    midodrine (PROAMATINE) 2.5 mg tablet    MIDODRINE HCL PO    mupirocin (BACTROBAN) 2 % ointment    prasugrel (EFFIENT) tablet    sacubitril-valsartan (Entresto) 24-26 MG TABS    Sevelamer Carbonate 2.4 g    Vitamin D3 1.25 MG (42957 UT) capsule       Assessment:  Principal Problem:    Fall  Active Problems:    Mixed hyperlipidemia    Anemia    ESRD on dialysis (HCC)    CAD, multiple vessel    Acute hypoxic respiratory failure (HCC)    Ischemic cardiomyopathy    Aortic stenosis    Acute embolism and thrombosis of right peroneal vein (HCC)    Type 2 diabetes mellitus (HCC)    Chronic kidney disease-mineral and bone disorder    HFrEF (heart failure with reduced ejection fraction) (HCC)    Cirrhosis (HCC)    Hyperkalemia            Counseling / Coordination of Care  Total floor / unit time spent today 25 minutes.  Greater than 50% of total time was spent with the patient and / or family counseling and / or coordination of care.  A description of the counseling / coordination of care: .

## 2025-04-21 NOTE — CASE MANAGEMENT
Case Management Assessment & Discharge Planning Note    Patient name Asad Galloway  Location S /S -01 MRN [id number]  : 1960 Date 2025       Current Admission Date: 2025  Current Admission Diagnosis:Fall   Patient Active Problem List    Diagnosis Date Noted Date Diagnosed    Hyperkalemia 2025     Cirrhosis ([facility]) 2025     Fluid overload 2025     Chronic kidney disease-mineral and bone disorder 2025     Anemia due to chronic kidney disease, on chronic dialysis ([facility]) 2025     HFrEF (heart failure with reduced ejection fraction) ([facility]) 2025     SIRS (systemic inflammatory response syndrome) ([facility]) 2025     Acute encephalopathy 2025     Peripheral arterial disease ([facility]) 2025     Type 2 diabetes mellitus ([facility]) 2024     Ambulatory dysfunction 2024     Thrombocytopenia ([facility]) 2024     Chronic deep vein thrombosis (DVT) of distal vein of right lower extremity ([facility]) 2024     Bicytopenia 2024     Obesity (BMI 30.0-34.9) 2024     QT prolongation 2024     Acute embolism and thrombosis of right peroneal vein ([facility]) 2024     Right ankle pain 2024     Pre-diabetes 2024     Hypertensive heart and chronic kidney disease with heart failure and with stage 5 chronic kidney disease, or end stage renal disease ([facility]) 2024     PAD (peripheral artery disease) ([facility]) 2024     Chronic systolic heart failure ([facility]) 2024     Edema of left lower extremity 2023     Rectal bleeding 2023     Elevated troponin 2023     Presence of external cardiac defibrillator 2023     Diabetic polyneuropathy associated with type 2 diabetes mellitus ([facility]) 2023     Absence of toe of right foot ([facility]) 2023     Hammer toes of both feet 2023     Ischemic cardiomyopathy 2023     Aortic stenosis 2023     Mood disorder ([facility]) 2023     Old myocardial  infarction 02/02/2023     Hyponatremia 02/01/2023     Acute hypoxic respiratory failure (HCC) 01/29/2023     Fall 01/06/2023     SOB (shortness of breath) 10/21/2022     Left arm swelling 10/21/2022     CAD, multiple vessel 07/14/2022     Electrolyte abnormality 05/02/2022     Chest pain 05/01/2022     Generalized weakness 04/19/2022     Primary hypertension 04/06/2022     Emotional lability 01/19/2022     History of 2019 novel coronavirus disease (COVID-19) 12/26/2020     ESRD on dialysis (HCC) 12/26/2020     Anemia 10/05/2020     S/P arteriovenous (AV) fistula creation 04/27/2020     Pre-kidney transplant, listed 04/27/2020     Urge incontinence 12/20/2019     Anxiety associated with depression 02/28/2019     History of stroke in adulthood 10/04/2018     Abnormal EEG 09/24/2018     Other constipation 08/17/2018     GERD (gastroesophageal reflux disease) 08/13/2018     Elevated alkaline phosphatase level 08/03/2018     Nephrotic range proteinuria 03/28/2018     Dizziness 02/26/2016     Mixed hyperlipidemia 02/26/2016     Antiplatelet or antithrombotic long-term use 02/26/2016       LOS (days): 4  Geometric Mean LOS (GMLOS) (days): 3.5  Days to GMLOS:-0.4     OBJECTIVE:  PATIENT READMITTED TO HOSPITAL  Risk of Unplanned Readmission Score: 39.23         Current admission status: Inpatient       Preferred Pharmacy:   [facility]/pharmacy #[id number] - [place] [address] [place] [place] [address] [facility] [address] [place] [address]  Phone: [phone] Fax: [phone]    Primary Care Provider: BRITTANY Allen    Primary Insurance: MEDICARE  Secondary Insurance:     ASSESSMENT:  Active Health Care Proxies       Eliana Galloway [facility] Care Representative - Spouse   Primary Phone: [phone] (Mobile)                           Readmission Root Cause  30 Day Readmission: Yes  During your hospital stay, did someone (provider, nurse, ) explain your care to you in a way you could understand?: Yes  Did you feel  medically stable to leave the hospital?: Yes  Were you able to pay for your medication at the pharmacy?: Yes  Did you have reliable transportation to take you to your appointments?: Yes  During previous admission, was a post-acute recommendation made?: Yes  What post-acute resources were offered?: STR  Patient was readmitted due to: Fall, hypotensive  Action Plan: Transfusion, intubation, critical care    Patient Information  Admitted from:: Home  Mental Status: Other (Comment) (Spoke with Pts son Sam)  During Assessment patient was accompanied by: Son  Assessment information provided by:: Son  Primary Caregiver: Spouse  Caregiver's Name:: Eliana Galloway  Caregiver's Relationship to Patient:: Significant Other  Support Systems: Spouse/significant other, Son, Daughter, Denominational/domingo community, Family members  County of Residence: [place]  What city do you live in?: [place]  Home entry access options. Select all that apply.: No steps to enter home  Type of Current Residence: Bi-level  Upon entering residence, is there a bedroom on the main floor (no further steps)?: Yes  Living Arrangements: Lives w/ Spouse/significant other, Lives w/ Daughter, Lives w/ Son    Activities of Daily Living Prior to Admission  Functional Status: Assistance  Completes ADLs independently?: No  Level of ADL dependence: Assistance  Ambulates independently?: Yes  Does patient use assisted devices?: Yes  Assisted Devices (DME) used: Walker, Wheelchair, Straight Cane  Does patient currently own DME?: Yes  What DME does the patient currently own?: Straight Cane, Walker, Wheelchair  Does patient have a history of Outpatient Therapy (PT/OT)?: No  Does the patient have a history of Short-Term Rehab?: Yes  Does patient have a history of HHC?: Yes         Patient Information Continued  Income Source: Pension/[employer]  Does patient receive dialysis treatments?: Yes (MWF at [facility] in [place])  Does patient have a history of substance abuse?:  No         Means of Transportation  Means of Transport to Appts:: Family transport          DISCHARGE DETAILS:    Discharge planning discussed with:: Pts sonSam via phone  Freedom of Choice: Yes     CM contacted family/caregiver?: Yes  Were Treatment Team discharge recommendations reviewed with patient/caregiver?: Yes  Did patient/caregiver verbalize understanding of patient care needs?: Yes  Were patient/caregiver advised of the risks associated with not following Treatment Team discharge recommendations?: Yes    Contacts  Patient Contacts: Sam (son)  Relationship to Patient:: Family  Contact Method: Phone  Phone Number: [phone] (M)  Reason/Outcome: Continuity of Care, Emergency Contact, Referral, Discharge Planning        Other Referral/Resources/Interventions Provided:  Referral Comments: Pt is recommended for acute rehab. CM reviewed with Pts son Sam who is agreeable to a referral to [facility] Acute rehab- submitted in [facility]. Pts 1st choice is [facility], 2nd is [facility]

## 2025-04-21 NOTE — ASSESSMENT & PLAN NOTE
[facility]  Access: Left arm AVF.  EDW 96 kg - although wife says it is 95 kg.   Plan for HD today.   PreHD weight is 96.1 kg and BP is on the low side.   Will plan for 0.5 kg UF. If BP goes down, will run even.

## 2025-04-21 NOTE — ASSESSMENT & PLAN NOTE
Recent Labs     04/19/25  0627 04/20/25  0637 04/21/25  0557   HGB 11.6* 11.3* 10.8*     Multifactorial etiology-ESRD, chronic disease  On admission Hb 9.5, s/p 2 unit whole blood for suspected hemorrhagic shock  Folate wnl  B12 384, will start vitamin B12 1000 mcg daily

## 2025-04-21 NOTE — PROGRESS NOTES
Progress Note - Hospitalist   Name: Asad Galloway 65 y.o. male I MRN: [id number]  Unit/Bed#: S -01 I Date of Admission: 4/17/2025   Date of Service: 4/21/2025 I Hospital Day: 4    Assessment & Plan  Fall  Per wife patient was waiting in the car while she attended a doctor's appointment.  Patient exit the car without his walker and subsequently fell.  Denies LOC or preceding symptoms   Found hypotensive  Trauma scans without fracture  CTH negative  CT ab/pel: Small subcutaneous hematoma of right hip without active bleed.  Otherwise no acute traumatic injury.  Cirrhosis with portal hypertension and small volume ascites.     Plan   Fall precautions  PT/OT  Mixed hyperlipidemia  Continue home Lipitor 40   Anemia  Recent Labs     04/19/25  0627 04/20/25  0637 04/21/25  0557   HGB 11.6* 11.3* 10.8*     Multifactorial etiology-ESRD, chronic disease  On admission Hb 9.5, s/p 2 unit whole blood for suspected hemorrhagic shock  Folate wnl  B12 384, will start vitamin B12 1000 mcg daily   ESRD on dialysis ([facility])  Lab Results   Component Value Date    EGFR 6 04/21/2025    EGFR 8 04/20/2025    EGFR 11 04/19/2025    CREATININE 7.44 (H) 04/21/2025    CREATININE 6.22 (H) 04/20/2025    CREATININE 4.92 (H) 04/19/2025     MWF schedule at [facility]  Nephrology consulted appreciate recommendations  CAD, multiple vessel  CAD S/P multiple stents with history over the past years, most recently in 2025 2/27/2025 [facility]: Mid circumflex 10%, first obtuse marginal 100%, first %, severe in-stent restenosis of LAD and  of OM1 ([facility])  3/17/2025 [facility] LM normal, proximal LAD 90%, circumflex luminal irregularities, first obtuse marginal 100%, right system not described, successful balloon angioplasty to proximal LAD with 0% residual stenosis ([facility])  He is on ASA, effient, statin  Resume home meds as able  Acute hypoxic respiratory failure (HCC)  Patient was noted to be hypoxic at the CT when laid flat  and reportedly hypotensive and thus was intubated in the ED.  Reportedly has been coughing for ~1- 2 weeks per wife  COVID flu RSV negative  WBC normal  procal 0.41  Urine antigens not obtained as patient is anuric  CT chest: No large PE, Cardiomegaly with small right pleural effusion and interstitial edema. Cirrhosis with ascites.  CXR: Mild pulmonary vascular congestion with trace pleural effusions  MRSA neg     Plan  Monitoring off ABX  Obtain sputum culture  Volume management with dialysis  Airway clearance protocol   Ischemic cardiomyopathy  Echo March 2025 LVEF 35-40%, global hypokinesis with regional variations, moderately reduced RV systolic function, severe left atrial dilation moderate to severely calcified aortic valve with moderate to severe aortic stenosis   Aortic stenosis  Severe AS, scheduled for TAVR in April 2025 at [facility]   Acute embolism and thrombosis of right peroneal vein (HCC)  Hx of upper extremity DVT in right upper extremity of eliquis 2.5 bid  Eliquis 2.5mg BID  Type 2 diabetes mellitus (HCC)  Lab Results   Component Value Date    HGBA1C 5.4 04/10/2025       Recent Labs     04/20/25  1132 04/20/25  1534 04/20/25  2110 04/21/25  0742   POCGLU 207* 154* 102 76       Blood Sugar Average: Last 72 hrs:  (P) [id number]    Controlled  Will continue SSI  Hypoglycemia protocol  Chronic kidney disease-mineral and bone disorder  Lab Results   Component Value Date    EGFR 6 04/21/2025    EGFR 8 04/20/2025    EGFR 11 04/19/2025    CREATININE 7.44 (H) 04/21/2025    CREATININE 6.22 (H) 04/20/2025    CREATININE 4.92 (H) 04/19/2025     Calcium and magnesium stable  hx hyperphosphatemia.  Binders recently discontinued during recent hospitalization at [place].  Continue low phosphorus diet  Secondary hyperparathyroidism of renal origin: On Hectorol 2 mcg 3 times a week with HD and Sensipar 90 mg 3 times a week with HD  Continue to monitor and manage as outpatient  HFrEF (heart failure with  reduced ejection fraction) (HCC)  Wt Readings from Last 3 Encounters:   04/20/25 101 kg (222 lb 10.6 oz)   04/09/25 100 kg (221 lb)   04/01/25 97.1 kg (214 lb)     Combined systolic and diastolic HF, [facility]  Last echo March 2025- Ef 35-40% with grade III DD, global hypokinesis, severely LA enlargement  Medications;Toprol-XL 25 twice daily, Entresto 24-26 twice daily with Volume management: Dialysis  For this admission volume up with concern for cardiogenic shock     BNP >4700  Trop 130/ 117/107  CXR Mild pulmonary venous congestion with trace pleural effusions.   4/17 echo EF 20%, severe global hypokinesis, ungraded diastolic dysfunction, severe RV dilation moderate AAS, moderate MR moderate TR  Plan   Volume per Dialysis  Resume home medications as able  Cardiology consulted for new reduction in EF, recommendations appreciated  Lifevest assessment completed 4/19, to be set up before discharge  Cirrhosis (HCC)  Known hx cirrhosis. Re-demonstrated on CTA chest/PE 4/17   Hyperkalemia  Recent Labs     04/19/25  0627 04/20/25  0637 04/21/25  0557   K 4.6 4.9 5.4*   MG 2.0 2.1 2.3     Elevated 4/21, will repeat BMP later today   On dialysis   Entresto currently held     VTE Pharmacologic Prophylaxis: VTE Score: 7 High Risk (Score >/= 5) - Pharmacological DVT Prophylaxis Ordered: apixaban (Eliquis). Sequential Compression Devices Ordered.    Mobility:   Basic Mobility Inpatient Raw Score: 12  JH-HLM Goal: 4: Move to chair/commode  JH-HLM Achieved: 2: Bed activities/Dependent transfer  JH-HLM Goal NOT achieved. Continue with multidisciplinary rounding and encourage appropriate mobility to improve upon JH-HLM goals.    Patient Centered Rounds: I performed bedside rounds with nursing staff today.   Discussions with Specialists or Other Care Team Provider: cardiology, nephrology     Education and Discussions with Family / Patient: Updated  (wife) via phone.    Current Length of Stay: 4 day(s)  Current Patient  Status: Inpatient   Certification Statement: The patient will continue to require additional inpatient hospital stay due to dialysis, BP management, need for lifevest before dc  Discharge Plan: Anticipate discharge in 24-48 hrs to rehab facility.    Code Status: Level 1 - Full Code    Subjective   Patient was seen and examined at bedside this am. Patient not complaining of SOB but does having wheezing on exam. Reports he feels well overall, is hungry and looking forward to breakfast.     Objective :  Temp:  [98.1 °F (36.7 °C)] 98.1 °F (36.7 °C)  HR:  [69-78] 70  BP: ()/(38-60) 99/55  Resp:  [16-18] 18  SpO2:  [89 %-98 %] 98 %  O2 Device: Nasal cannula  Nasal Cannula O2 Flow Rate (L/min):  [2 L/min] 2 L/min    Body mass index is 32.88 kg/m².     Input and Output Summary (last 24 hours):     Intake/Output Summary (Last 24 hours) at 4/21/2025 0823  Last data filed at 4/21/2025 0601  Gross per 24 hour   Intake 931 ml   Output 0 ml   Net 931 ml       Physical Exam  Constitutional:       Appearance: Normal appearance.   HENT:      Head: Normocephalic.      Right Ear: External ear normal.      Left Ear: External ear normal.      Nose:      Comments: Abrasion nasal bridge     Mouth/Throat:      Pharynx: Oropharynx is clear.   Eyes:      Conjunctiva/sclera: Conjunctivae normal.      Pupils: Pupils are equal, round, and reactive to light.   Cardiovascular:      Rate and Rhythm: Normal rate and regular rhythm.      Pulses: Normal pulses.      Heart sounds: Murmur heard.   Pulmonary:      Effort: Pulmonary effort is normal.      Breath sounds: Wheezing present.   Abdominal:      Palpations: Abdomen is soft.      Tenderness: There is no abdominal tenderness.   Musculoskeletal:      Cervical back: Neck supple.      Right lower leg: No edema.      Left lower leg: No edema.   Skin:     General: Skin is warm.   Neurological:      Mental Status: He is alert. Mental status is at baseline.      Comments: AO2   Psychiatric:          Mood and Affect: Mood normal.         Behavior: Behavior normal.         Lines/Drains:              Lab Results: I have reviewed the following results:   Results from last 7 days   Lab Units 04/21/25  0557 04/20/25  0637   WBC Thousand/uL 7.26 6.67   HEMOGLOBIN g/dL 10.8* 11.3*   HEMATOCRIT % 35.5* 35.7*   PLATELETS Thousands/uL 144* 143*   SEGS PCT %  --  74   LYMPHO PCT %  --  12*   MONO PCT %  --  10   EOS PCT %  --  2     Results from last 7 days   Lab Units 04/21/25  0557 04/18/25  2047 04/18/25  0446   SODIUM mmol/L 134*   < > 135   POTASSIUM mmol/L 5.4*   < > 5.2   CHLORIDE mmol/L 95*   < > 95*   CO2 mmol/L 24   < > 27   BUN mg/dL 54*   < > 33*   CREATININE mg/dL 7.44*   < > 5.30*   ANION GAP mmol/L 15*   < > 13   CALCIUM mg/dL 8.0*   < > 7.8*   ALBUMIN g/dL  --   --  3.7   TOTAL BILIRUBIN mg/dL  --   --  0.97   ALK PHOS U/L  --   --  130*   ALT U/L  --   --  9   AST U/L  --   --  13   GLUCOSE RANDOM mg/dL 71   < > 92    < > = values in this interval not displayed.     Results from last 7 days   Lab Units 04/18/25  0446   INR  1.35*     Results from last 7 days   Lab Units 04/21/25  0742 04/20/25  2110 04/20/25  1534 04/20/25  1132 04/20/25  0801 04/19/25  2058 04/19/25  1654 04/19/25  1150 04/19/25  0801 04/18/25  2142 04/18/25  1951 04/18/25  1832   POC GLUCOSE mg/dl 76 102 154* 207* 93 116 82 115 72 84 89 77         Results from last 7 days   Lab Units 04/18/25  0446 04/17/25  1347   LACTIC ACID mmol/L  --  1.4   PROCALCITONIN ng/ml 0.47*  --        Recent Cultures (last 7 days):   Results from last 7 days   Lab Units 04/17/25  1641   BLOOD CULTURE  No Growth at 72 hrs.  No Growth at 72 hrs.       Imaging Results Review: I reviewed radiology reports from this admission including: chest xray.  Other Study Results Review: No additional pertinent studies reviewed.    Last 24 Hours Medication List:     Current Facility-Administered Medications:     acetaminophen (TYLENOL) tablet 650 mg, Q6H PRN    apixaban  (ELIQUIS) tablet 2.5 mg, BID    atorvastatin (LIPITOR) tablet 40 mg, Daily With Dinner    chlorhexidine (PERIDEX) 0.12 % oral rinse 15 mL, Q12H MICH    [Held by provider] Cholecalciferol (VITAMIN D3) tablet 1,000 Units, Daily    [Held by provider] cinacalcet (SENSIPAR) tablet 30 mg, Daily With Breakfast    cyanocobalamin (VITAMIN B-12) tablet 1,000 mcg, Daily    [Transfer Hold] guaiFENesin (ROBITUSSIN) oral liquid 200 mg, Q4H PRN    insulin lispro (HumALOG/ADMELOG) 100 units/mL subcutaneous injection 1-6 Units, 4x Daily (PC & HS) **AND** Fingerstick Glucose (POCT), 4x Daily AC and at bedtime    levalbuterol (XOPENEX) inhalation solution 0.63 mg, Q6H PRN    lidocaine (LIDODERM) 5 % patch 1 patch, Daily    [Held by provider] melatonin tablet 3 mg, HS    metoprolol succinate (TOPROL-XL) 24 hr tablet 25 mg, BID    midodrine (PROAMATINE) tablet 10 mg, Before Dialysis    prasugrel (EFFIENT) tablet 10 mg, Daily    [Held by provider] sacubitril-valsartan (ENTRESTO) 24-26 MG per tablet 1 tablet, BID    Administrative Statements   Today, Patient Was Seen By: Aniyah Guerrero DO    **Please Note: This note may have been constructed using a voice recognition system.**

## 2025-04-21 NOTE — ASSESSMENT & PLAN NOTE
Defer to primary service.   Patient attempted to walk without his walker which led to a fall.   Low BP probably contributing.

## 2025-04-21 NOTE — ASSESSMENT & PLAN NOTE
Patient was noted to be hypoxic at the CT when laid flat and reportedly hypotensive and thus was intubated in the ED.  Reportedly has been coughing for ~1- 2 weeks per wife  COVID flu RSV negative  WBC normal  procal 0.41  Urine antigens not obtained as patient is anuric  CT chest: No large PE, Cardiomegaly with small right pleural effusion and interstitial edema. Cirrhosis with ascites.  CXR: Mild pulmonary vascular congestion with trace pleural effusions  MRSA neg     Plan  Monitoring off ABX  Obtain sputum culture  Volume management with dialysis  Airway clearance protocol

## 2025-04-22 LAB
ANION GAP SERPL CALCULATED.3IONS-SCNC: 12 MMOL/L (ref 4–13)
BACTERIA BLD CULT: NORMAL
BACTERIA BLD CULT: NORMAL
BASOPHILS # BLD AUTO: 0.05 THOUSANDS/ÂΜL (ref 0–0.1)
BASOPHILS NFR BLD AUTO: 1 % (ref 0–1)
BUN SERPL-MCNC: 43 MG/DL (ref 5–25)
CALCIUM SERPL-MCNC: 8.2 MG/DL (ref 8.4–10.2)
CHLORIDE SERPL-SCNC: 96 MMOL/L (ref 96–108)
CO2 SERPL-SCNC: 26 MMOL/L (ref 21–32)
CREAT SERPL-MCNC: 7.03 MG/DL (ref 0.6–1.3)
EOSINOPHIL # BLD AUTO: 0.25 THOUSAND/ÂΜL (ref 0–0.61)
EOSINOPHIL NFR BLD AUTO: 4 % (ref 0–6)
ERYTHROCYTE [DISTWIDTH] IN BLOOD BY AUTOMATED COUNT: 17.2 % (ref 11.6–15.1)
GFR SERPL CREATININE-BSD FRML MDRD: 7 ML/MIN/1.73SQ M
GLUCOSE SERPL-MCNC: 112 MG/DL (ref 65–140)
GLUCOSE SERPL-MCNC: 133 MG/DL (ref 65–140)
GLUCOSE SERPL-MCNC: 180 MG/DL (ref 65–140)
GLUCOSE SERPL-MCNC: 89 MG/DL (ref 65–140)
HCT VFR BLD AUTO: 37.6 % (ref 36.5–49.3)
HGB BLD-MCNC: 11.6 G/DL (ref 12–17)
IMM GRANULOCYTES # BLD AUTO: 0.04 THOUSAND/UL (ref 0–0.2)
IMM GRANULOCYTES NFR BLD AUTO: 1 % (ref 0–2)
LYMPHOCYTES # BLD AUTO: 1 THOUSANDS/ÂΜL (ref 0.6–4.47)
LYMPHOCYTES NFR BLD AUTO: 15 % (ref 14–44)
MAGNESIUM SERPL-MCNC: 2.1 MG/DL (ref 1.9–2.7)
MCH RBC QN AUTO: 31.5 PG (ref 26.8–34.3)
MCHC RBC AUTO-ENTMCNC: 30.9 G/DL (ref 31.4–37.4)
MCV RBC AUTO: 102 FL (ref 82–98)
MONOCYTES # BLD AUTO: 0.58 THOUSAND/ÂΜL (ref 0.17–1.22)
MONOCYTES NFR BLD AUTO: 9 % (ref 4–12)
NEUTROPHILS # BLD AUTO: 4.88 THOUSANDS/ÂΜL (ref 1.85–7.62)
NEUTS SEG NFR BLD AUTO: 70 % (ref 43–75)
NRBC BLD AUTO-RTO: 0 /100 WBCS
PHOSPHATE SERPL-MCNC: 5.8 MG/DL (ref 2.3–4.1)
PLATELET # BLD AUTO: 140 THOUSANDS/UL (ref 149–390)
PMV BLD AUTO: 10.7 FL (ref 8.9–12.7)
POTASSIUM SERPL-SCNC: 4.8 MMOL/L (ref 3.5–5.3)
RBC # BLD AUTO: 3.68 MILLION/UL (ref 3.88–5.62)
SODIUM SERPL-SCNC: 134 MMOL/L (ref 135–147)
WBC # BLD AUTO: 6.8 THOUSAND/UL (ref 4.31–10.16)

## 2025-04-22 PROCEDURE — 99232 SBSQ HOSP IP/OBS MODERATE 35: CPT | Performed by: INTERNAL MEDICINE

## 2025-04-22 PROCEDURE — 97535 SELF CARE MNGMENT TRAINING: CPT

## 2025-04-22 PROCEDURE — NC001 PR NO CHARGE: Performed by: FAMILY MEDICINE

## 2025-04-22 PROCEDURE — 85025 COMPLETE CBC W/AUTO DIFF WBC: CPT

## 2025-04-22 PROCEDURE — 83735 ASSAY OF MAGNESIUM: CPT

## 2025-04-22 PROCEDURE — 82948 REAGENT STRIP/BLOOD GLUCOSE: CPT

## 2025-04-22 PROCEDURE — 92526 ORAL FUNCTION THERAPY: CPT

## 2025-04-22 PROCEDURE — 80048 BASIC METABOLIC PNL TOTAL CA: CPT

## 2025-04-22 PROCEDURE — 97163 PT EVAL HIGH COMPLEX 45 MIN: CPT

## 2025-04-22 PROCEDURE — 84100 ASSAY OF PHOSPHORUS: CPT

## 2025-04-22 PROCEDURE — NC001 PR NO CHARGE

## 2025-04-22 RX ORDER — MIDODRINE HYDROCHLORIDE 5 MG/1
5 TABLET ORAL 3 TIMES DAILY
Status: DISCONTINUED | OUTPATIENT
Start: 2025-04-22 | End: 2025-04-24

## 2025-04-22 RX ADMIN — CYANOCOBALAMIN TAB 500 MCG 1000 MCG: 500 TAB at 10:04

## 2025-04-22 RX ADMIN — MIDODRINE HYDROCHLORIDE 5 MG: 5 TABLET ORAL at 21:53

## 2025-04-22 RX ADMIN — SEVELAMER HYDROCHLORIDE 2400 MG: 800 TABLET ORAL at 10:03

## 2025-04-22 RX ADMIN — TRIMETHOBENZAMIDE HYDROCHLORIDE 200 MG: 100 INJECTION INTRAMUSCULAR at 10:04

## 2025-04-22 RX ADMIN — TRIMETHOBENZAMIDE HYDROCHLORIDE 200 MG: 100 INJECTION INTRAMUSCULAR at 17:31

## 2025-04-22 RX ADMIN — INSULIN LISPRO 1 UNITS: 100 INJECTION, SOLUTION INTRAVENOUS; SUBCUTANEOUS at 12:14

## 2025-04-22 RX ADMIN — PRASUGREL 10 MG: 10 TABLET, FILM COATED ORAL at 10:05

## 2025-04-22 RX ADMIN — APIXABAN 2.5 MG: 2.5 TABLET, FILM COATED ORAL at 18:08

## 2025-04-22 RX ADMIN — APIXABAN 2.5 MG: 2.5 TABLET, FILM COATED ORAL at 10:04

## 2025-04-22 RX ADMIN — LIDOCAINE 5% 1 PATCH: 700 PATCH TOPICAL at 10:04

## 2025-04-22 NOTE — PLAN OF CARE
Problem: Prexisting or High Potential for Compromised Skin Integrity  Goal: Skin integrity is maintained or improved  Description: INTERVENTIONS:- Identify patients at risk for skin breakdown- Assess and monitor skin integrity- Assess and monitor nutrition and hydration status- Monitor labs - Assess for incontinence - Turn and reposition patient- Assist with mobility/ambulation- Relieve pressure over bony prominences- Avoid friction and shearing- Provide appropriate hygiene as needed including keeping skin clean and dry- Evaluate need for skin moisturizer/barrier cream- Collaborate with interdisciplinary team - Patient/family teaching- Consider wound care consult   Outcome: Progressing     Problem: PAIN - ADULT  Goal: Verbalizes/displays adequate comfort level or baseline comfort level  Description: Interventions:- Encourage patient to monitor pain and request assistance- Assess pain using appropriate pain scale- Administer analgesics based on type and severity of pain and evaluate response- Implement non-pharmacological measures as appropriate and evaluate response- Consider cultural and social influences on pain and pain management- Notify physician/advanced practitioner if interventions unsuccessful or patient reports new pain  Outcome: Progressing     Problem: INFECTION - ADULT  Goal: Absence or prevention of progression during hospitalization  Description: INTERVENTIONS:- Assess and monitor for signs and symptoms of infection- Monitor lab/diagnostic results- Monitor all insertion sites, i.e. indwelling lines, tubes, and drains- Monitor endotracheal if appropriate and nasal secretions for changes in amount and color- [place] appropriate cooling/warming therapies per order- Administer medications as ordered- Instruct and encourage patient and family to use good hand hygiene technique- Identify and instruct in appropriate isolation precautions for identified infection/condition  Outcome: Progressing  Goal:  Absence of fever/infection during neutropenic period  Description: INTERVENTIONS:- Monitor WBC  Outcome: Progressing     Problem: SAFETY ADULT  Goal: Patient will remain free of falls  Description: INTERVENTIONS:- Educate patient/family on patient safety including physical limitations- Instruct patient to call for assistance with activity - Consult OT/PT to assist with strengthening/mobility - Keep Call bell within reach- Keep bed low and locked with side rails adjusted as appropriate- Keep care items and personal belongings within reach- Initiate and maintain comfort rounds- Make Fall Risk Sign visible to staff- Offer Toileting every 2 Hours, in advance of need- Initiate/Maintain bed/chair alarm- Obtain necessary fall risk management equipment: - Apply yellow socks and bracelet for high fall risk patients- Consider moving patient to room near nurses station  Outcome: Progressing     Problem: DISCHARGE PLANNING  Goal: Discharge to home or other facility with appropriate resources  Description: INTERVENTIONS:- Identify barriers to discharge w/patient and caregiver- Arrange for needed discharge resources and transportation as appropriate- Identify discharge learning needs (meds, wound care, etc.)- Arrange for interpretive services to assist at discharge as needed- Refer to Case Management Department for coordinating discharge planning if the patient needs post-hospital services based on physician/advanced practitioner order or complex needs related to functional status, cognitive ability, or social support system  Outcome: Progressing     Problem: METABOLIC, FLUID AND ELECTROLYTES - ADULT  Goal: Electrolytes maintained within normal limits  Description: INTERVENTIONS:- Monitor labs and assess patient for signs and symptoms of electrolyte imbalances- Administer electrolyte replacement as ordered- Monitor response to electrolyte replacements, including repeat lab results as appropriate- Instruct patient on fluid and  nutrition as appropriate  Outcome: Progressing  Goal: Fluid balance maintained  Description: INTERVENTIONS:- Monitor labs - Monitor I/O and WT- Instruct patient on fluid and nutrition as appropriate- Assess for signs & symptoms of volume excess or deficit  Outcome: Progressing

## 2025-04-22 NOTE — ASSESSMENT & PLAN NOTE
Wt Readings from Last 3 Encounters:   04/22/25 98.6 kg (217 lb 6 oz)   04/09/25 100 kg (221 lb)   04/01/25 97.1 kg (214 lb)     Combined systolic and diastolic HF, [facility]  Last echo March 2025- Ef 35-40% with grade III DD, global hypokinesis, severely LA enlargement  Medications;Toprol-XL 25 twice daily, Entresto 24-26 twice daily with Volume management: Dialysis  For this admission volume up with concern for cardiogenic shock     BNP >4700  Trop 130/ 117/107  CXR Mild pulmonary venous congestion with trace pleural effusions.   4/17 echo EF 20%, severe global hypokinesis, ungraded diastolic dysfunction, severe RV dilation moderate AAS, moderate MR moderate TR  Plan   Volume per Dialysis  Resume home medications as able  Cardiology consulted for new reduction in EF, recommendations appreciated  Lifevest assessment completed 4/19, life vest in patient's room

## 2025-04-22 NOTE — DISCHARGE INSTR - AVS FIRST PAGE
Dear Asad Galloway,     It was our pleasure to care for you here at [facility].  It is our hope that we were always able to exceed the expected standards for your care during your stay.  You were hospitalized due to fall.  You were cared for on the 3rd floor by Aniyah Guerrero DO under the service of Christiano New MD with the [facility] who covers for your primary care physician (PCP), BRITTANY Allen, while you were hospitalized.  If you have any questions or concerns related to this hospitalization, you may contact us at .  For follow up as well as any medication refills, we recommend that you follow up with your primary care physician.  A registered nurse will reach out to you by phone within a few days after your discharge to answer any additional questions that you may have after going home.  However, at this time we provide for you here, the most important instructions / recommendations at discharge:     Notable Medication Adjustments -   Start taking methylprednisolone and follow instructions on packaging on how to take it. This should help with reducing swelling and inflammation after your intubation. If this does not go away after completing this pack, please follow up with your outpatient PCP  Start taking midodrine 7.5 mg three time a day. Take 10 mg before dialysis. You were previously on midodrine 2.5 mg and 5 mg before dialysis so stop these dosages.   Start taking vitamin B12 1000 mcg daily   Stop taking entresto and follow up with cardiology   Stop taking sensipar and follow up with nephrology    Stop taking aspirin  Stop taking doxycycline   Testing Required after Discharge -   None  Important follow up information -   Please follow up with your outpatient PCP within one week of discharge.   Please follow up closely with your outpatient cardiologist   Please follow up with dialysis and nephrology.  The preoperative history and physical exam Reid Paige was reviewed.  I re-examined the patient.  There have been no interval changes in the history and physical.  Jens remains a candidate for surgery and wishes to proceed.  All questions were answered and consent obtained and signed.    1/10/07101:59 AM         Other Instructions -   Please use your lifevest upon discharge. Thank you!  Please review this entire after visit summary as additional general instructions including medication list, appointments, activity, diet, any pertinent wound care, and other additional recommendations from your care team that may be provided for you.      Sincerely,     Aniyah Guerrero DO

## 2025-04-22 NOTE — ASSESSMENT & PLAN NOTE
Wt Readings from Last 3 Encounters:   04/22/25 98.6 kg (217 lb 6 oz)   04/09/25 100 kg (221 lb)   04/01/25 97.1 kg (214 lb)     Combined systolic and diastolic HF, [facility]  Last echo March 2025- Ef 35-40% with grade III DD, global hypokinesis, severely LA enlargement  Medications;Toprol-XL 25 twice daily, Entresto 24-26 twice daily with Volume management: Dialysis  For this admission volume up with concern for cardiogenic shock     BNP >4700  Trop 130/ 117/107  CXR Mild pulmonary venous congestion with trace pleural effusions.   Repeat CXR mild cardiomegaly, slight increase mild vascular congestion, small r pleural effusion  4/17 echo EF 20%, severe global hypokinesis, ungraded diastolic dysfunction, severe RV dilation moderate AAS, moderate MR moderate TR  Cardiology consulted while in patient. Stopped entresto. Okay with dc with medrol dose hood.   Plan   Volume per Dialysis  Stop taking entresto per cardiology given low BPs  Lifevest  Op cardio f/u

## 2025-04-22 NOTE — ASSESSMENT & PLAN NOTE
Patient was noted to be hypoxic at the CT when laid flat and reportedly hypotensive and thus was intubated in the ED.  Reportedly has been coughing for ~1- 2 weeks per wife  COVID flu RSV negative  WBC normal  procal 0.41  Urine antigens not obtained as patient is anuric  CT chest: No large PE, Cardiomegaly with small right pleural effusion and interstitial edema. Cirrhosis with ascites.  CXR: Mild pulmonary vascular congestion with trace pleural effusions  MRSA neg     Plan  Monitoring off ABX  Volume management with dialysis  Airway clearance protocol

## 2025-04-22 NOTE — ASSESSMENT & PLAN NOTE
Per wife patient was waiting in the car while she attended a doctor's appointment.  Patient exit the car without his walker and subsequently fell.  Denies LOC or preceding symptoms   Found hypotensive  Trauma scans without fracture  CTH negative  CT ab/pel: Small subcutaneous hematoma of right hip without active bleed.  Otherwise no acute traumatic injury.  Cirrhosis with portal hypertension and small volume ascites.     Plan   Fall precautions  PT/OT- rec rehab, patient and family agreeable

## 2025-04-22 NOTE — PLAN OF CARE
Problem: PHYSICAL THERAPY ADULT  Goal: Performs mobility at highest level of function for planned discharge setting.  See evaluation for individualized goals.  Description: Treatment/Interventions: Functional transfer training, LE strengthening/ROM, Therapeutic exercise, Endurance training, Patient/family training, Equipment eval/education, Bed mobility, Gait training, Cognitive reorientation          See flowsheet documentation for full assessment, interventions and recommendations.  Note: Prognosis: Fair  Problem List: Decreased strength, Decreased endurance, Impaired balance, Decreased mobility, Decreased cognition, Impaired judgement, Decreased safety awareness  Assessment: Pt is a 65 y.o. male seen for PT evaluation s/p admit to [facility] on 8/18/2022 w/ Fall.  Order placed for PT. Comorbidities affecting pt's physical performance at time of assessment include: DM, HTN, and neuropathy. Personal factors affecting pt at time of IE include: inability to perform IADLs, inability to perform ADLs, and limited insight into impairments. Prior to admission, pt lived in one floor environment and lived with spouse and son . Upon evaluation: Pt requires max A x1 for bed mobility, min A x2 for sit to stand, and mod A x2 for SPT with RW.  Currently, pt presents with the following deficits: weakness, impaired balance, decreased endurance, gait deviations, decreased activity tolerance, decreased safety awareness, impaired judgement, fall risk, and decreased cognition.  Pt's clinical presentation is currently unstable/unpredictable seen in pt's presentation of fall risk, poor insight into deficits, and significant decline in functional mobility compared to baseline.  Pt to benefit from continued skilled PT tx while in hospital and upon DC to address deficits as defined above and maximize level of functional mobility.  Recommend  progression of ambulation and transfers as appropriate .        Rehab Resource Intensity  Level, PT: I (Maximum Resource Intensity)    See flowsheet documentation for full assessment.

## 2025-04-22 NOTE — OCCUPATIONAL THERAPY NOTE
Occupational Therapy Treatment Note     Patient Name: Asad Galloway  Today's Date: 4/22/2025  Problem List  Principal Problem:    Fall  Active Problems:    Mixed hyperlipidemia    Anemia    ESRD on dialysis (HCC)    CAD, multiple vessel    Acute hypoxic respiratory failure (HCC)    Ischemic cardiomyopathy    Aortic stenosis    Acute embolism and thrombosis of right peroneal vein (HCC)    Type 2 diabetes mellitus (HCC)    Chronic kidney disease-mineral and bone disorder    HFrEF (heart failure with reduced ejection fraction) (HCC)    Cirrhosis (HCC)    Hyperkalemia        04/22/25 1007   OT Last Visit   OT Visit Date 04/22/25   Note Type   Note Type Treatment   Pain Assessment   Pain Assessment Tool FLACC   Pain Rating: FLACC (Rest) - Face 0   Pain Rating: FLACC (Rest) - Legs 0   Pain Rating: FLACC (Rest) - Activity 0   Pain Rating: FLACC (Rest) - Cry 1   Pain Rating: FLACC (Rest) - Consolability 0   Score: FLACC (Rest) 1   Pain Rating: FLACC (Activity) - Face 0   Pain Rating: FLACC (Activity) - Legs 0   Pain Rating: FLACC (Activity) - Activity 0   Pain Rating: FLACC (Activity) - Cry 1   Pain Rating: FLACC (Activity) - Consolability 0   Score: FLACC (Activity) 1   Restrictions/Precautions   Weight Bearing Precautions Per Order No   Other Precautions Cognitive;Chair Alarm;Bed Alarm;Impulsive;Telemetry;Fall Risk;Pain  (R hemiparesis)   Lifestyle   Autonomy PTA pt is A with ADLs, A with IADLs. mod (I) c RW. Lives c spouse.   Reciprocal Relationships supportive sons, spouse   Service to Others retired    ADL   Where Assessed   (Assessment of ADLs limited on this date at patient refuses participation despite encouragment and extensive education.)   Eating Assistance 5  Supervision/Setup   Eating Deficit Setup;Beverage management   Eating Comments repetitively asked for assistance to open candy bar, with increased encouragment pt able to complete w/ inc time using fine motor skills and bilateral in hand  "manipulation   Bed Mobility   Supine to Sit 2  Maximal assistance   Additional items Assist x 1;HOB elevated;Increased time required;Verbal cues;LE management  (trunk managment)   Sit to Supine   (NT: OOB to recliner chair)   Additional Comments Moderate (A) to reposition pelvis towards the EOB for optimal sitting position and balance. Intermittently reports dizziness.   Transfers   Sit to Stand 4  Minimal assistance   Additional items Assist x 2;Increased time required;Verbal cues   Stand to Sit 3  Moderate assistance   Additional items Assist x 2;Increased time required;Impulsive;Verbal cues  (premature sit onto recliner chair, increased (A) to acheive a safe sitting position)   Stand pivot 3  Moderate assistance   Additional items Assist x 2;Increased time required;Verbal cues  (w/ RW. Poor application of precautions and education. Attempts to sit prematurely stating \"I can't do this anymore\")   Additional Comments Use of RW. Tactile / verbal cues for body mechanics and safety.   Functional Mobility   Functional Mobility 3  Moderate assistance   Additional Comments Ax2, to ambulate short distance w/ use of RW. Reports dizziness post ambulation, BP 73/56. Pt reclined in chair, BP recovering to 105/58 w/ inc time. RN aware and present in the room   Additional items Rolling walker   Subjective   Subjective \"I am dying!!!\"   Cognition   Overall Cognitive Status Impaired   Arousal/Participation Alert   Attention Difficulty attending to directions   Orientation Level Oriented to person;Oriented to situation;Disoriented to place;Disoriented to time   Memory Decreased recall of precautions;Decreased recall of recent events;Decreased short term memory   Following Commands Follows one step commands with increased time or repetition   Comments Pt ID via wristband, name and . Agitated and HIGHLY emotionally labile however is easily redirectable. POOR insight into current limtiaitions and deficits.   Activity Tolerance "   Activity Tolerance Patient limited by fatigue;Treatment limited secondary to medical complications (Comment)  (hypotension and self-limiting behaviors / learned dependence)   Medical Staff Made Aware Spoke with SNOW MCKEON RN Jessica   Assessment   Assessment Patient seen for OT treatment on 4/22/2025 s/p admission for Fall Patient agreeable to OT session. Patient participated in fall prevention , self-care transfers, bed mobility , functional mobility, and ADLs with intervention focus on optimizing independence in functional mobility, self-care transfers and ADL tasks. Asad Galloway is showing improvements in activity tolerance, balance, strength, sitting balance but is continuing to perform below baseline due to the following deficits: endurance ,  decreased muscular strength , decreased balance , decreased standing tolerance for self care tasks , decreased dynamic balance impacting functional reach, decreased functional reach , decreased trunk control , decreased activity tolerance , impaired memory , attention to task, impaired judgement and problem solving , impaired safety awareness , impulsive behavior, mood limitation, impaired learning ability d/t current cognitive status , impaired global mental status, and direction following. Personal factors continuing to impact D/C include: (+) Hx of falls , Assistance needed for ADL/IADLs, Assistance needed for ADLs and functional mobility, High fall risk , decreased insight toward deficits , decreased recall of precautions , health management, and baseline cognitive deficits From OT standpoint, patient would benefit from skilled intervention to maximize independence with ADLs and functional mobility. Goals remain appropriate, continue POC. At this time, recommending D/C to: level II (moderate resource intensity).   Plan   Treatment Interventions ADL retraining;Functional transfer training;Cognitive reorientation;Patient/family training;Equipment  evaluation/education;Compensatory technique education;Energy conservation;Activityengagement   Goal Expiration Date 04/28/25   OT Treatment Day 1   OT Frequency 3-5x/wk   Discharge Recommendation   Rehab Resource Intensity Level, OT II (Moderate Resource Intensity)   AM-PAC Daily Activity Inpatient   Lower Body Dressing 2   Bathing 2   Toileting 2   Upper Body Dressing 2   Grooming 3   Eating 3   Daily Activity Raw Score 14   Daily Activity Standardized Score (Calc for Raw Score >=11) 33.39   AM-PAC Applied Cognition Inpatient   Following a Speech/Presentation 2   Understanding Ordinary Conversation 3   Taking Medications 1   Remembering Where Things Are Placed or Put Away 2   Remembering List of 4-5 Errands 2   Taking Care of Complicated Tasks 1   Applied Cognition Raw Score 11   Applied Cognition Standardized Score 27.03   End of Consult   Education Provided Yes   Patient Position at End of Consult Bedside chair;Bed/Chair alarm activated;All needs within reach   Nurse Communication Nurse aware of consult       The patient's raw score on the AM-PAC Daily Activity Inpatient Short Form is 14. A raw score of less than 19 suggests the patient may benefit from discharge to post-acute rehabilitation services. Please refer to the recommendation of the Occupational Therapist for safe discharge planning.    Pt benefited from co-session of skilled OT and PT therapists in order to most appropriately address functional deficits d/t extensive physical assistance required for safe mobility  and decreased activity tolerance.  OT/PT objectives were addressed separately; please see PT note for specific goal areas targeted.    Chapis Lam MS OTR/L   [place] Licensure# [id number]

## 2025-04-22 NOTE — ASSESSMENT & PLAN NOTE
Echo March 2025 LVEF 35-40%, global hypokinesis with regional variations, moderately reduced RV systolic function, severe left atrial dilation moderate to severely calcified aortic valve with moderate to severe aortic stenosis     -Continue metoprolol 25 mg bid   -Lifevest  -Cariology op f/u

## 2025-04-22 NOTE — ASSESSMENT & PLAN NOTE
Lab Results   Component Value Date    EGFR 7 04/22/2025    EGFR 6 04/21/2025    EGFR 8 04/20/2025    CREATININE 7.03 (H) 04/22/2025    CREATININE 7.44 (H) 04/21/2025    CREATININE 6.22 (H) 04/20/2025     MWF schedule at [facility]  Access ERROL RAOF  Per nephrology, stop taking sensipar given low calcium   F/u nephro op

## 2025-04-22 NOTE — ASSESSMENT & PLAN NOTE
Per wife patient was waiting in the car while she attended a doctor's appointment.  Patient exit the car without his walker and subsequently fell.  Denies LOC or preceding symptoms   Found hypotensive  Trauma scans without fracture  CTH negative  CT ab/pel: Small subcutaneous hematoma of right hip without active bleed.  Otherwise no acute traumatic injury.  Cirrhosis with portal hypertension and small volume ascites.     Plan   Family declined STR- dc with home health

## 2025-04-22 NOTE — PHYSICAL THERAPY NOTE
PHYSICAL THERAPY EVALUATION  NAME: Asad Galloway  AGE:   65 y.o.  MRN:  [id number]  ADMIT DX: Unspecified multiple injuries, initial encounter [T07.XXXA]    PMH:   Past Medical History:   Diagnosis Date    Cerebrovascular accident (CVA) due to thrombosis of left middle cerebral artery (HCC) 07/29/2018    Chronic kidney disease     Coronary artery disease     Diabetes mellitus (HCC)     not on meds    Dialysis patient ([facility])     M-W-F    Fistula     left upper arm for hemodialyis    GERD (gastroesophageal reflux disease)     History of coronary artery stent placement     x3    Hypercholesteremia     Hyperlipidemia     Hypertension     Infectious viral hepatitis     B as child    Limb alert care status     no BP/IV left arm    Neuropathy     Nonhealing ulcer of heel (HCC) 04/25/2023    Obesity     Osteomyelitis ([facility])     last assessed 11/4/16-per son not currently    Penetrating foot wound, left, initial encounter 01/19/2022    PVC's (premature ventricular contractions)     sees  Cardio    Stroke ([facility])     last weeof July 2018 [facility]    TIA (transient ischemic attack) 10/28/2018    Ulcer of left heel, limited to breakdown of skin ([facility]) 06/13/2024    Wears dentures     Wears glasses      LENGTH OF STAY: 5        04/22/25 1009   PT Last Visit   PT Visit Date 04/22/25   Note Type   Note type Evaluation   Pain Assessment   Pain Assessment Tool 0-10   Pain Score No Pain  (inconsistently talking about pain and no pain)   Restrictions/Precautions   Weight Bearing Precautions Per Order No   Other Precautions Cognitive;Chair Alarm;Bed Alarm;Impulsive;Fall Risk   Home Living   Type of Home House   Home Layout Able to live on main level with bedroom/bathroom;Multi-level;Other (Comment)  (0 RAFAT through basement level, able to maintain single level living on that level with bedroom/bathroom/kitchen)   Bathroom Shower/Tub Walk-in shower   Bathroom Toilet Raised   Bathroom Equipment Built-in shower seat;Grab  bars around toilet;Grab bars in shower   Home Equipment Walker  (in the process of ordering a WC)   Additional Comments Ambulates with supervision and RW at baseline.   Prior Function   Level of [place] Independent with functional mobility;Needs assistance with IADLS;Needs assistance with ADLs   Lives With Spouse   Receives Help From Family;Outpatient therapy   IADLs Family/Friend/Other provides meals;Family/Friend/Other provides medication management;Family/Friend/Other provides transportation   Falls in the last 6 months 1 to 4   Vocational Retired   General   Family/Caregiver Present No   Cognition   Overall Cognitive Status Impaired   Arousal/Participation Alert   Attention Attends with cues to redirect   Orientation Level Oriented to person;Oriented to time   Memory Decreased recall of precautions;Decreased recall of recent events;Decreased short term memory   Following Commands Follows one step commands with increased time or repetition   Comments Pt identified by name and .   Subjective   Subjective Pt agrees to PT evaluation and is cooperative throughout session.  Emotionally labile with agitated outbursts intermittently.  Able to redirect quickly.   RLE Assessment   RLE Assessment X   Strength RLE   RLE Overall Strength 4-/5  (functionally)   LLE Assessment   LLE Assessment X   Strength LLE   LLE Overall Strength 4-/5  (functionally)   Bed Mobility   Supine to Sit 2  Maximal assistance   Additional items Assist x 1;HOB elevated;Bedrails;Increased time required;Verbal cues;LE management   Sit to Supine Unable to assess  (left OOB in chair post session with alarm intact)   Transfers   Sit to Stand 4  Minimal assistance   Additional items Assist x 2;Increased time required;Verbal cues   Stand to Sit 3  Moderate assistance   Additional items Assist x 2;Increased time required;Verbal cues   Stand pivot 3  Moderate assistance   Additional items Assist x 2;Increased time required;Verbal cues  (RW; steppage  transfer, however attempting to sit prematurely, requires increased assist for safe transfer into chair with poor adherance to precautions)   Ambulation/Elevation   Gait pattern Not appropriate  (due to low BP) BP= 73/56, 80/55, 77/34, and 105/58   (All in sitting after mobility; last pressure taken from above IV site on R upper arm, all others from R forearm)   Balance   Static Sitting Poor +   Dynamic Sitting Poor   Static Standing Poor +   Dynamic Standing Poor   Endurance Deficit   Endurance Deficit Yes   Endurance Deficit Description limited standing tolerance, decrease in BP   Activity Tolerance   Activity Tolerance Patient limited by fatigue;Treatment limited secondary to medical complications (Comment)   Medical Staff Made Aware OT Chap[place]   Nurse Made Aware Per RN, pt appropriate to evaluate; updated on status and aware of BP   Assessment   Prognosis Fair   Problem List Decreased strength;Decreased endurance;Impaired balance;Decreased mobility;Decreased cognition;Impaired judgement;Decreased safety awareness   Assessment Pt is a 65 y.o. male seen for PT evaluation s/p admit to [facility] on 8/18/2022 w/ Fall.  Order placed for PT. Comorbidities affecting pt's physical performance at time of assessment include: DM, HTN, and neuropathy. Personal factors affecting pt at time of IE include: inability to perform IADLs, inability to perform ADLs, and limited insight into impairments. Prior to admission, pt lived in one floor environment and lived with spouse and son . Upon evaluation: Pt requires max A x1 for bed mobility, min A x2 for sit to stand, and mod A x2 for SPT with RW.  Currently, pt presents with the following deficits: weakness, impaired balance, decreased endurance, gait deviations, decreased activity tolerance, decreased safety awareness, impaired judgement, fall risk, and decreased cognition.  Pt's clinical presentation is currently unstable/unpredictable seen in pt's presentation of fall  risk, poor insight into deficits, and significant decline in functional mobility compared to baseline.  Pt to benefit from continued skilled PT tx while in hospital and upon DC to address deficits as defined above and maximize level of functional mobility.  Recommend  progression of ambulation and transfers as appropriate .   Goals   Patient Goals pt appears to be agreeable to go to rehab; no other goals at this time   STG Expiration Date 05/02/25   Short Term Goal #1 Pt will be able to: (1) perform bed mobility with supervision to promote OOB activity (2) perform sit to stand with supervision to decrease burden of care (3) ambulate at least 200` with supervision and least restrictive AD to increase activity tolerance (4) increase standing balance by 1 grade to decrease risk of falls   PT Treatment Day 0   Plan   Treatment/Interventions Functional transfer training;LE strengthening/ROM;Therapeutic exercise;Endurance training;Patient/family training;Equipment eval/education;Bed mobility;Gait training;Cognitive reorientation   PT Frequency 3-5x/wk   Discharge Recommendation   Rehab Resource Intensity Level, PT I (Maximum Resource Intensity)   AM-PAC Basic Mobility Inpatient   Turning in Flat Bed Without Bedrails 3   Lying on Back to Sitting on Edge of Flat Bed Without Bedrails 2   Moving Bed to Chair 1   Standing Up From Chair Using Arms 2   Walk in Room 1   Climb 3-5 Stairs With Railing 1   Basic Mobility Inpatient Raw Score 10   Turning Head Towards Sound 3   Follow Simple Instructions 2   Low Function Basic Mobility Raw Score  15   Low Function Basic Mobility Standardized Score  23.9   [facility] Highest Level Of Mobility   -[facility] Goal 4: Move to chair/commode   -HLM Achieved 4: Move to chair/commode   End of Consult   Patient Position at End of Consult Bedside chair;Bed/Chair alarm activated;All needs within reach   Pt was seen for a co-eval with OT due to potential need for significant physical assist, poor  pain control, impaired mental status, limiting behaviors, and/or poor adherence to precautions.     The patient's AM-[facility] Basic Mobility Inpatient Short Form Raw Score is 10, Standardized Score is  .  A Raw Score of less than 16 suggests the patient may benefit from discharge to post-acute rehabilitation services. However please refer to therapist recommendation for discharge planning given other factors that may influence destination.     Adapted from Sean EGAN, Jasmina J, Andrés J, João J. [employer] of -[facility] “6-Clicks” Basic Mobility and Daily Activity Scores With Discharge Destination. Physical Therapy, 2021;101:1-9. DOI: [email or web address]      Coni Bosch PT,NEALT

## 2025-04-22 NOTE — PROGRESS NOTES
Progress Note - Hospitalist   Name: Asad Galloway 65 y.o. male I MRN: [id number]  Unit/Bed#: S -01 I Date of Admission: 4/17/2025   Date of Service: 4/22/2025 I Hospital Day: 5    Assessment & Plan  Fall  Per wife patient was waiting in the car while she attended a doctor's appointment.  Patient exit the car without his walker and subsequently fell.  Denies LOC or preceding symptoms   Found hypotensive  Trauma scans without fracture  CTH negative  CT ab/pel: Small subcutaneous hematoma of right hip without active bleed.  Otherwise no acute traumatic injury.  Cirrhosis with portal hypertension and small volume ascites.     Plan   Fall precautions  PT/OT- rec rehab, patient and family agreeable  Mixed hyperlipidemia  Continue home Lipitor 40   Anemia  Recent Labs     04/20/25  0637 04/21/25  0557 04/22/25  0619   HGB 11.3* 10.8* 11.6*     Multifactorial etiology-ESRD, chronic disease  On admission Hb 9.5, s/p 2 unit whole blood for suspected hemorrhagic shock  Folate wnl  B12 384, will start vitamin B12 1000 mcg daily   ESRD on dialysis ([facility])  Lab Results   Component Value Date    EGFR 7 04/22/2025    EGFR 6 04/21/2025    EGFR 8 04/20/2025    CREATININE 7.03 (H) 04/22/2025    CREATININE 7.44 (H) 04/21/2025    CREATININE 6.22 (H) 04/20/2025     MWF schedule at [facility]  Nephrology consulted appreciate recommendations  CAD, multiple vessel  CAD S/P multiple stents with history over the past years, most recently in 2025 2/27/2025 [facility]: Mid circumflex 10%, first obtuse marginal 100%, first %, severe in-stent restenosis of LAD and  of OM1 ([facility])  3/17/2025 [facility] LM normal, proximal LAD 90%, circumflex luminal irregularities, first obtuse marginal 100%, right system not described, successful balloon angioplasty to proximal LAD with 0% residual stenosis ([facility])  Continue metoprolol 25 mg bid   Discontinued entresto per cardiology  Continue prasugrel and apixaban for AC  Acute  hypoxic respiratory failure (HCC)  Patient was noted to be hypoxic at the CT when laid flat and reportedly hypotensive and thus was intubated in the ED.  Reportedly has been coughing for ~1- 2 weeks per wife  COVID flu RSV negative  WBC normal  procal 0.41  Urine antigens not obtained as patient is anuric  CT chest: No large PE, Cardiomegaly with small right pleural effusion and interstitial edema. Cirrhosis with ascites.  CXR: Mild pulmonary vascular congestion with trace pleural effusions  MRSA neg     Plan  Monitoring off ABX  Volume management with dialysis  Airway clearance protocol   Ischemic cardiomyopathy  Echo March 2025 LVEF 35-40%, global hypokinesis with regional variations, moderately reduced RV systolic function, severe left atrial dilation moderate to severely calcified aortic valve with moderate to severe aortic stenosis     -Continue metoprolol 25 mg bid   -Lifevest in patient's room ready for dc   -Cariology recs appreciated   Aortic stenosis  Severe AS, scheduled for TAVR in April 2025 at [facility]   Acute embolism and thrombosis of right peroneal vein (HCC)  Hx of upper extremity DVT in right upper extremity of eliquis 2.5 bid  Eliquis 2.5mg BID  Type 2 diabetes mellitus (HCC)  Lab Results   Component Value Date    HGBA1C 5.4 04/10/2025       Recent Labs     04/21/25  1209 04/21/25  1546 04/21/25  1622 04/21/25  2124   POCGLU 105 64* 131 109       Blood Sugar Average: Last 72 hrs:  (P) [id number]    Controlled  Will continue SSI  Hypoglycemia protocol  Chronic kidney disease-mineral and bone disorder  Lab Results   Component Value Date    EGFR 7 04/22/2025    EGFR 6 04/21/2025    EGFR 8 04/20/2025    CREATININE 7.03 (H) 04/22/2025    CREATININE 7.44 (H) 04/21/2025    CREATININE 6.22 (H) 04/20/2025     Calcium and magnesium stable  hx hyperphosphatemia.  Binders recently discontinued during recent hospitalization at [place].  Continue low phosphorus diet  Secondary hyperparathyroidism  of renal origin: On Hectorol 2 mcg 3 times a week with HD and Sensipar 90 mg 3 times a week with HD  Continue to monitor and manage as outpatient  HFrEF (heart failure with reduced ejection fraction) ([facility])  Wt Readings from Last 3 Encounters:   04/22/25 98.6 kg (217 lb 6 oz)   04/09/25 100 kg (221 lb)   04/01/25 97.1 kg (214 lb)     Combined systolic and diastolic HF, [facility]  Last echo March 2025- Ef 35-40% with grade III DD, global hypokinesis, severely LA enlargement  Medications;Toprol-XL 25 twice daily, Entresto 24-26 twice daily with Volume management: Dialysis  For this admission volume up with concern for cardiogenic shock     BNP >4700  Trop 130/ 117/107  CXR Mild pulmonary venous congestion with trace pleural effusions.   4/17 echo EF 20%, severe global hypokinesis, ungraded diastolic dysfunction, severe RV dilation moderate AAS, moderate MR moderate TR  Plan   Volume per Dialysis  Resume home medications as able  Cardiology consulted for new reduction in EF, recommendations appreciated  Lifevest assessment completed 4/19, life vest in patient's room   Cirrhosis (HCC)  Known hx cirrhosis. Re-demonstrated on CTA chest/PE 4/17   Hyperkalemia  Recent Labs     04/20/25  0637 04/21/25  0557 04/22/25  0619   K 4.9 5.4* 4.8   MG 2.1 2.3 2.1     Elevated prior to dialysis, resolved  On dialysis   Entresto discontinued per cardiology    VTE Pharmacologic Prophylaxis: VTE Score: 7 High Risk (Score >/= 5) - Pharmacological DVT Prophylaxis Ordered: apixaban (Eliquis). Sequential Compression Devices Ordered.    Mobility:   Basic Mobility Inpatient Raw Score: 12  JH-HLM Goal: 4: Move to chair/commode  JH-HLM Achieved: 2: Bed activities/Dependent transfer  JH-HLM Goal NOT achieved. Continue with multidisciplinary rounding and encourage appropriate mobility to improve upon JH-HLM goals.    Patient Centered Rounds: I performed bedside rounds with nursing staff today.   Discussions with Specialists or Other Care Team Provider:  cardiology    Education and Discussions with Family / Patient: Attempted to update  (wife) via phone. Unable to contact.    Current Length of Stay: 5 day(s)  Current Patient Status: Inpatient   Certification Statement: The patient will continue to require additional inpatient hospital stay due to rehab placement  Discharge Plan: Anticipate discharge tomorrow to rehab facility.    Code Status: Level 1 - Full Code    Subjective   Seen and examined at bedside this a.m.  Reports no difficulty breathing, chest pain.  Tolerating food well.  Recent BM, no blood in stool.  Feels better today compared to yesterday.    Objective :  Temp:  [97.5 °F (36.4 °C)-98.1 °F (36.7 °C)] 98.1 °F (36.7 °C)  HR:  [65-79] 75  BP: ()/(35-85) 102/85  Resp:  [16-18] 16  SpO2:  [92 %-98 %] 95 %  O2 Device: None (Room air)    Body mass index is 32.1 kg/m².     Input and Output Summary (last 24 hours):     Intake/Output Summary (Last 24 hours) at 4/22/2025 0727  Last data filed at 4/22/2025 0501  Gross per 24 hour   Intake 1561 ml   Output 413 ml   Net 1148 ml       Physical Exam  Vitals reviewed.   Constitutional:       Appearance: Normal appearance.   HENT:      Head: Normocephalic and atraumatic.      Right Ear: External ear normal.      Left Ear: External ear normal.      Nose: Nose normal.      Comments: Healing abrasion on nasal bridge     Mouth/Throat:      Pharynx: Oropharynx is clear.   Eyes:      Conjunctiva/sclera: Conjunctivae normal.      Pupils: Pupils are equal, round, and reactive to light.   Cardiovascular:      Rate and Rhythm: Normal rate and regular rhythm.      Pulses: Normal pulses.      Comments: Distant heart sounds  Pulmonary:      Effort: Pulmonary effort is normal.      Comments: Decreased air movement  Abdominal:      General: Abdomen is flat.      Palpations: Abdomen is soft.   Musculoskeletal:      Cervical back: Neck supple.      Right lower leg: No edema.      Left lower leg: No edema.   Skin:      General: Skin is warm.      Findings: Bruising and erythema present.   Neurological:      Mental Status: He is alert and oriented to person, place, and time. Mental status is at baseline.   Psychiatric:         Mood and Affect: Mood normal.         Behavior: Behavior normal.         Lines/Drains:              Lab Results: I have reviewed the following results:   Results from last 7 days   Lab Units 04/22/25  0619   WBC Thousand/uL 6.80   HEMOGLOBIN g/dL 11.6*   HEMATOCRIT % 37.6   PLATELETS Thousands/uL 140*   SEGS PCT % 70   LYMPHO PCT % 15   MONO PCT % 9   EOS PCT % 4     Results from last 7 days   Lab Units 04/22/25  0619 04/18/25 2047 04/18/25  0446   SODIUM mmol/L 134*   < > 135   POTASSIUM mmol/L 4.8   < > 5.2   CHLORIDE mmol/L 96   < > 95*   CO2 mmol/L 26   < > 27   BUN mg/dL 43*   < > 33*   CREATININE mg/dL 7.03*   < > 5.30*   ANION GAP mmol/L 12   < > 13   CALCIUM mg/dL 8.2*   < > 7.8*   ALBUMIN g/dL  --   --  3.7   TOTAL BILIRUBIN mg/dL  --   --  0.97   ALK PHOS U/L  --   --  130*   ALT U/L  --   --  9   AST U/L  --   --  13   GLUCOSE RANDOM mg/dL 89   < > 92    < > = values in this interval not displayed.     Results from last 7 days   Lab Units 04/18/25  0446   INR  1.35*     Results from last 7 days   Lab Units 04/21/25  2124 04/21/25  1622 04/21/25  1546 04/21/25  1209 04/21/25  0912 04/21/25  0742 04/20/25  2110 04/20/25  1534 04/20/25  1132 04/20/25  0801 04/19/25  2058 04/19/25  1654   POC GLUCOSE mg/dl 109 131 64* 105 113 76 102 154* 207* 93 116 82         Results from last 7 days   Lab Units 04/18/25  0446 04/17/25  1347   LACTIC ACID mmol/L  --  1.4   PROCALCITONIN ng/ml 0.47*  --        Recent Cultures (last 7 days):   Results from last 7 days   Lab Units 04/17/25  1641   BLOOD CULTURE  No Growth After 4 Days.  No Growth After 4 Days.       Imaging Results Review: No pertinent imaging studies reviewed.  Other Study Results Review: No additional pertinent studies reviewed.    Last 24 Hours  Medication List:     Current Facility-Administered Medications:     acetaminophen (TYLENOL) tablet 650 mg, Q6H PRN    apixaban (ELIQUIS) tablet 2.5 mg, BID    atorvastatin (LIPITOR) tablet 40 mg, Daily With Dinner    chlorhexidine (PERIDEX) 0.12 % oral rinse 15 mL, Q12H MICH    [Held by provider] Cholecalciferol (VITAMIN D3) tablet 1,000 Units, Daily    cyanocobalamin (VITAMIN B-12) tablet 1,000 mcg, Daily    doxercalciferol (HECTOROL) injection 2 mcg, After Dialysis    [Transfer Hold] guaiFENesin (ROBITUSSIN) oral liquid 200 mg, Q4H PRN    insulin lispro (HumALOG/ADMELOG) 100 units/mL subcutaneous injection 1-6 Units, 4x Daily (PC & HS) **AND** Fingerstick Glucose (POCT), 4x Daily AC and at bedtime    levalbuterol (XOPENEX) inhalation solution 0.63 mg, Q6H PRN    lidocaine (LIDODERM) 5 % patch 1 patch, Daily    [Held by provider] melatonin tablet 3 mg, HS    metoprolol succinate (TOPROL-XL) 24 hr tablet 25 mg, BID    midodrine (PROAMATINE) tablet 10 mg, Before Dialysis    prasugrel (EFFIENT) tablet 10 mg, Daily    [Held by provider] sacubitril-valsartan (ENTRESTO) 24-26 MG per tablet 1 tablet, BID    sevelamer (RENAGEL) oral suspension 2,400 mg, TID With Meals    trimethobenzamide (TIGAN) IM injection 200 mg, Q6H PRN    Administrative Statements   Today, Patient Was Seen By: Aniyah Guerrero DO    **Please Note: This note may have been constructed using a voice recognition system.**

## 2025-04-22 NOTE — ASSESSMENT & PLAN NOTE
Recent Labs     04/20/25  0637 04/21/25  0557 04/22/25  0619   HGB 11.3* 10.8* 11.6*     Multifactorial etiology-ESRD, chronic disease  On admission Hb 9.5, s/p 2 unit whole blood for suspected hemorrhagic shock  Folate wnl  B12 384, will start vitamin B12 1000 mcg daily

## 2025-04-22 NOTE — ASSESSMENT & PLAN NOTE
OP Rx: He is on Hectorol 2 mcg and Sensipar 90 mg for 2HPT with HD.   OP Rx: He is on Renvela for hyperphos.   Phos is above goal   Continue Sevelamer 2400 mg TID.   Continue Hectorol 2 mcg with HD.   Hold off on Sensipar due to hypocalcemia.

## 2025-04-22 NOTE — ASSESSMENT & PLAN NOTE
Lab Results   Component Value Date    EGFR 7 04/22/2025    EGFR 6 04/21/2025    EGFR 8 04/20/2025    CREATININE 7.03 (H) 04/22/2025    CREATININE 7.44 (H) 04/21/2025    CREATININE 6.22 (H) 04/20/2025     Calcium and magnesium stable  hx hyperphosphatemia.  Binders recently discontinued during recent hospitalization at [place].  Continue low phosphorus diet  Secondary hyperparathyroidism of renal origin: On Hectorol 2 mcg 3 times a week with HD and Sensipar 90 mg 3 times a week with HD. Per nephro: hold off on sensipar due to hypocalcemia  Continue to monitor and manage as outpatient

## 2025-04-22 NOTE — ASSESSMENT & PLAN NOTE
Recent Labs     04/20/25  0637 04/21/25  0557 04/22/25  0619   K 4.9 5.4* 4.8   MG 2.1 2.3 2.1     Elevated prior to dialysis, resolved  On dialysis   Entresto discontinued per cardiology

## 2025-04-22 NOTE — ASSESSMENT & PLAN NOTE
Lab Results   Component Value Date    EGFR 7 04/22/2025    EGFR 6 04/21/2025    EGFR 8 04/20/2025    CREATININE 7.03 (H) 04/22/2025    CREATININE 7.44 (H) 04/21/2025    CREATININE 6.22 (H) 04/20/2025     Calcium and magnesium stable  hx hyperphosphatemia.  Binders recently discontinued during recent hospitalization at [place].  Continue low phosphorus diet  Secondary hyperparathyroidism of renal origin: On Hectorol 2 mcg 3 times a week with HD and Sensipar 90 mg 3 times a week with HD  Continue to monitor and manage as outpatient

## 2025-04-22 NOTE — SPEECH THERAPY NOTE
Speech Language/Pathology    Speech/Language Pathology Progress Note    Patient Name: Asad Galloway  Today's Date: 4/22/2025     Problem List  Principal Problem:    Fall  Active Problems:    Mixed hyperlipidemia    Anemia    ESRD on dialysis (HCC)    CAD, multiple vessel    Acute hypoxic respiratory failure (HCC)    Ischemic cardiomyopathy    Aortic stenosis    Acute embolism and thrombosis of right peroneal vein (HCC)    Type 2 diabetes mellitus (HCC)    Chronic kidney disease-mineral and bone disorder    HFrEF (heart failure with reduced ejection fraction) (HCC)    Cirrhosis (HCC)    Hyperkalemia       Past Medical History  Past Medical History:   Diagnosis Date    Cerebrovascular accident (CVA) due to thrombosis of left middle cerebral artery ([facility]) 07/29/2018    Chronic kidney disease     Coronary artery disease     Diabetes mellitus (HCC)     not on meds    Dialysis patient ([facility])     M-W-F    Fistula     left upper arm for hemodialyis    GERD (gastroesophageal reflux disease)     History of coronary artery stent placement     x3    Hypercholesteremia     Hyperlipidemia     Hypertension     Infectious viral hepatitis     B as child    Limb alert care status     no BP/IV left arm    Neuropathy     Nonhealing ulcer of heel ([facility]) 04/25/2023    Obesity     Osteomyelitis ([facility])     last assessed 11/4/16-per son not currently    Penetrating foot wound, left, initial encounter 01/19/2022    PVC's (premature ventricular contractions)     sees [facility]    Stroke ([facility])     last weeof July 2018 [facility]    TIA (transient ischemic attack) 10/28/2018    Ulcer of left heel, limited to breakdown of skin ([facility]) 06/13/2024    Wears dentures     Wears glasses         Past Surgical History  Past Surgical History:   Procedure Laterality Date    ABDOMINAL SURGERY      CARDIAC CATHETERIZATION N/A 05/02/2022    Procedure: Cardiac Coronary Angiogram;  Surgeon: Sam Davsi MD;  Location: [facility];  Service:  "Cardiology    CARDIAC CATHETERIZATION N/A 05/02/2022    Procedure: Cardiac pci;  Surgeon: Sam Davis MD;  Location: [facility];  Service: Cardiology    CARDIAC CATHETERIZATION  02/01/2023    Procedure: Cardiac catheterization;  Surgeon: Sam Davis MD;  Location: [facility];  Service: Cardiology    CARDIAC CATHETERIZATION N/A 02/01/2023    Procedure: Cardiac pci;  Surgeon: Sam Davis MD;  [facility]: [facility];  Service: Cardiology    CARDIAC CATHETERIZATION N/A 02/01/2023    Procedure: Cardiac Coronary Angiogram;  Surgeon: Sam Davis MD;  Location: [facility];  Service: Cardiology    CARDIAC CATHETERIZATION N/A 02/01/2023    Procedure: Cardiac other-IVUS;  Surgeon: Sam Davis MD;  Location: [facility]ARDIAC [facility];  Service: Cardiology    CHOLECYSTECTOMY      Percutaneous    COLONOSCOPY      CORONARY ANGIOPLASTY WITH STENT PLACEMENT      CYSTOSCOPY      HEMODIALYSIS ADULT  1/22/2024    HEMODIALYSIS ADULT  01/24/2024    IR LOWER EXTREMITY ANGIOGRAM  9/29/2023    IR LOWER EXTREMITY ANGIOGRAM  02/26/2024    OTHER SURGICAL HISTORY      \"stimulator to control bowel movements\"    UT ESOPHAGOGASTRODUODENOSCOPY TRANSORAL DIAGNOSTIC N/A 09/27/2016    Procedure: ESOPHAGOGASTRODUODENOSCOPY (EGD);  Surgeon: Adele oRwe MD;  Location: [facility] GI LAB;  Service: Gastroenterology    UT LAPAROSCOPY SURG CHOLECYSTECTOMY N/A 02/29/2016    Procedure: LAPAROSCOPIC CHOLECYSTECTOMY ;  Surgeon: Cliff Roman DO;  Location: [facility];  Service: General    UT SLCTV CATHJ 3RD+ ORD SLCTV ABDL PEL/LXTR BRNCH Left 9/29/2023    Procedure: Left leg angiogram with intervention;  Surgeon: Michelle Galvan MD;  Location: [facility];  Service: Vascular    UT SLCTV CATHJ 3RD+ ORD SLCTV ABDL PEL/LXTR BRNCH Left 02/26/2024    Procedure: Left leg angiogram antegrade access, left popliteal angioplasty;  Surgeon: Michelle Galvan MD;  Location: [facility];  Service: Vascular    ROTATOR CUFF REPAIR Right     " "SPINAL CORD STIMULATOR IMPLANT      \"[employer]tronic interstim model # 3058- in lower back to control bowel movements-currently turned off-battery is dead\"    TOE AMPUTATION Right 10/28/2016    Procedure: 3RD TOE AMPUTATION ;  Surgeon: Anjel Salas DPM;  Location: [facility];  Service:        Subjective:  Pt alert and upright in chair. Pleasantly confused. Increased IRVING compared to prior sessions. Dysphagia 3 (dental soft)/thin liquid meal tray present. Son present at bedside.     Objective:  Pt agreeable to trials of regular solid (pretzels, chips). Pt seen upon arrival spitting out food due to \"dislike of meal\". Pt asking SLP for toast at this time and refusing to wear dentures. With multiple pieces of pretzels, pt with mildly prolonged (suspect due to edentulism) but functional mastication. Adequate AP transfer. Mild oral residue but functional, however, pt spitting out on floor (suspect behavioral).Timely and effective pharyngeal swallow. No overt s/s of aspiration with solid trials and thins. Noted significant impulsivity- pt taking large bites at fast rate and benefiting from mod verbal cues for safe swallow strategies.     Assessment:  Pt tolerating trials of regular/thin liquids without s/s of aspiration. Refusing dentures, but exhibiting functional mastication.     Plan/Recommendations:  Recommend upgrade to regular/thin liquids. Ensure pt has dentures in for meals. Recommend direct supervision with meals due to impulsivity. Will f/u x1 for tolerance as pt is on baseline diet.   "

## 2025-04-22 NOTE — ASSESSMENT & PLAN NOTE
Lab Results   Component Value Date    EGFR 7 04/22/2025    EGFR 6 04/21/2025    EGFR 8 04/20/2025    CREATININE 7.03 (H) 04/22/2025    CREATININE 7.44 (H) 04/21/2025    CREATININE 6.22 (H) 04/20/2025     MWF schedule at [facility]  Nephrology consulted appreciate recommendations

## 2025-04-22 NOTE — ASSESSMENT & PLAN NOTE
[facility]  Access: Left arm AVF.  EDW 96 kg - although wife says it is 95 kg.   Only had 1 hour 45 min HD yesterday.   We were planning for HD today. However, given low BP and stable electrolytes, we will hold off.   Plan for HD tomorrow.

## 2025-04-22 NOTE — PROGRESS NOTES
Cardiology  Asad JACOB Galloway 65 y.o. male MRN: [id number]  Unit/Bed#: S -01 Encounter: [id number]    Assessment & Plan   - Fall, initial encounter  - Multivessel CAD  - ESRD on dialysis  - Acute hypoxemic respiratory failure  - ischemic cardiomyopathy, new reduced EF 20%  - moderate AS awaiting TAVR   - Mitral regurgitation  - Thrombosis right peroneal vein on Eliquis  - anemia  - Congestive heart failure with reduced EF    Assessment:  Patient Active Problem List   Diagnosis    Dizziness    Mixed hyperlipidemia    Antiplatelet or antithrombotic long-term use    Nephrotic range proteinuria    Elevated alkaline phosphatase level    GERD (gastroesophageal reflux disease)    Other constipation    Abnormal EEG    History of stroke in adulthood    Anxiety associated with depression    Urge incontinence    S/P arteriovenous (AV) fistula creation    Pre-kidney transplant, listed    Anemia    History of 2019 novel coronavirus disease (COVID-19)    ESRD on dialysis (HCC)    Emotional lability    Primary hypertension    Generalized weakness    Chest pain    Electrolyte abnormality    CAD, multiple vessel    SOB (shortness of breath)    Left arm swelling    Fall    Acute hypoxic respiratory failure (HCC)    Hyponatremia    Ischemic cardiomyopathy    Aortic stenosis    Mood disorder (HCC)    Diabetic polyneuropathy associated with type 2 diabetes mellitus (HCC)    Absence of toe of right foot (HCC)    Hammer toes of both feet    Elevated troponin    Presence of external cardiac defibrillator    Rectal bleeding    Edema of left lower extremity    Chronic systolic heart failure (HCC)    Old myocardial infarction    Hypertensive heart and chronic kidney disease with heart failure and with stage 5 chronic kidney disease, or end stage renal disease (HCC)    PAD (peripheral artery disease) (HCC)    Pre-diabetes    Right ankle pain    Acute embolism and thrombosis of right peroneal vein (HCC)    Bicytopenia    Obesity (BMI  30.0-34.9)    QT prolongation    Chronic deep vein thrombosis (DVT) of distal vein of right lower extremity (HCC)    Thrombocytopenia (HCC)    Type 2 diabetes mellitus (HCC)    Ambulatory dysfunction    Peripheral arterial disease (HCC)    Chronic kidney disease-mineral and bone disorder    Anemia due to chronic kidney disease, on chronic dialysis (HCC)    HFrEF (heart failure with reduced ejection fraction) (HCC)    SIRS (systemic inflammatory response syndrome) (HCC)    Acute encephalopathy    Fluid overload    Cirrhosis (HCC)    Hyperkalemia       Plan:  Multivessel CAD s/p PCI:  - continue metoprolol 25 mg BID, discontinue entresto  - continue prasugrel and apixaban for anticoagulation  - continue to closely monitor blood pressures OP to avoid hypotension    Ischemic cardiomyopathy, new reduced EF 20%:  - continue metoprolol 25 mg BID  - LifeVest ordered for discharge  - continue daily weights, I/Os while inpatient  - continue daily weights, low Na diet OP  - closely OP cardiology follow-up    Moderate AS:  - awaiting TAVR by [facility] in April, 2025  - continue to follow with cardiology OP    ESRD on HD:  - continue management per nephrology  - close monitoring for hypotension pre/post dialysis  - continue prn midodrine 10 mg PO before dialysis to prevent hypotension    Telemetry reviewed: NSR w/ BBB, rate 80's    Admission Status: INPATIENT   Expected Length of Stay: <2 Midnights    History of Present Illness   HPI:  Asad Galloway is a 65 y.o. male with PMH of CHF, ESRD on dialysis, CAD s/p recent restenting at [facility], ischemic cardiomyopathy with new EF 20%, severe AS awaiting TAVR at [facility], mitral regurgitation, and thrombosis R peroneal vein on eliquis who presented to the hospital after suffering a fall getting out of his car. His wife thinks the fall was secondary to him not using his assistive device when getting out of the vehicle. He denied hitting his head or LOC. All trauma imaging was negative  for acute fractures. He was hypotensive SBP in 60s for EMS. The fall is thought to be secondary to hypotension. He received 2 units of whole blood and was placed on Levophed for hypotension. Initial CXR showed mild pulmonary venous congestion with trace bilateral pleural effusions.  BNP was > 4700 on admission. He required intubation after becoming hypoxic during initial evaluation by trauma in the ED, likely secondary to acute CHF exacerbation.   Cardiology was consulted for reduction in EF from 30-35% 3/1/25 to 20% this admission on repeat TTE. Patient has remained hypotensive during admission requiring discontinuation of Entresto.     Subjective:  No overnight events. Patient reports feeling well and has no complaints. Patient is interested in knowing when discharge will be. He denies any chest pain, pressure, SOB. He reports taking his BP at home without problems. He reports checking his weight 3 times/week and monitors for weight gain.       Review of Systems   Constitutional: Negative.  Negative for chills and fever.   HENT: Negative.  Negative for ear pain and sore throat.    Eyes: Negative.  Negative for pain and visual disturbance.   Respiratory: Negative.  Negative for cough and shortness of breath.    Cardiovascular: Negative.  Negative for chest pain and palpitations.   Gastrointestinal: Negative.  Negative for abdominal pain and vomiting.   Endocrine: Negative.    Genitourinary: Negative.  Negative for dysuria and hematuria.   Musculoskeletal: Negative.  Negative for arthralgias and back pain.   Skin: Negative.  Negative for rash.   Allergic/Immunologic: Negative.    Neurological: Negative.  Negative for seizures and syncope.   Hematological: Negative.    Psychiatric/Behavioral: Negative.     All other systems reviewed and are negative.      Historical Information   Past Medical History:   Diagnosis Date    Cerebrovascular accident (CVA) due to thrombosis of left middle cerebral artery (HCC) 07/29/2018     Chronic kidney disease     Coronary artery disease     Diabetes mellitus ([facility])     not on meds    Dialysis patient ([facility])     M-W-F    Fistula     left upper arm for hemodialyis    GERD (gastroesophageal reflux disease)     History of coronary artery stent placement     x3    Hypercholesteremia     Hyperlipidemia     Hypertension     Infectious viral hepatitis     B as child    Limb alert care status     no BP/IV left arm    Neuropathy     Nonhealing ulcer of heel ([facility]) 04/25/2023    Obesity     Osteomyelitis ([facility])     last assessed 11/4/16-per son not currently    Penetrating foot wound, left, initial encounter 01/19/2022    PVC's (premature ventricular contractions)     sees  Cardio    Stroke ([facility])     last weeof July 2018 [facility]    TIA (transient ischemic attack) 10/28/2018    Ulcer of left heel, limited to breakdown of skin ([facility]) 06/13/2024    Wears dentures     Wears glasses      Past Surgical History:   Procedure Laterality Date    ABDOMINAL SURGERY      CARDIAC CATHETERIZATION N/A 05/02/2022    Procedure: Cardiac Coronary Angiogram;  Surgeon: Sam Davis MD;  [facility];  Service: Cardiology    CARDIAC CATHETERIZATION N/A 05/02/2022    Procedure: Cardiac pci;  Surgeon: Sam Davis MD;  [facility];  Service: Cardiology    CARDIAC CATHETERIZATION  02/01/2023    Procedure: Cardiac catheterization;  Surgeon: Sam Davis MD;  [facility];  Service: Cardiology    CARDIAC CATHETERIZATION N/A 02/01/2023    Procedure: Cardiac pci;  Surgeon: Sam Davis MD;  [facility];  Service: Cardiology    CARDIAC CATHETERIZATION N/A 02/01/2023    Procedure: Cardiac Coronary Angiogram;  Surgeon: Sam Davis MD;  [facility];  Service: Cardiology    CARDIAC CATHETERIZATION N/A 02/01/2023    Procedure: Cardiac other-IVUS;  Surgeon: Sam Davis MD;  [facility];  Service: Cardiology     "CHOLECYSTECTOMY      Percutaneous    COLONOSCOPY      CORONARY ANGIOPLASTY WITH STENT PLACEMENT      CYSTOSCOPY      HEMODIALYSIS ADULT  1/22/2024    HEMODIALYSIS ADULT  01/24/2024    IR LOWER EXTREMITY ANGIOGRAM  9/29/2023    IR LOWER EXTREMITY ANGIOGRAM  02/26/2024    OTHER SURGICAL HISTORY      \"stimulator to control bowel movements\"    PA ESOPHAGOGASTRODUODENOSCOPY TRANSORAL DIAGNOSTIC N/A 09/27/2016    Procedure: ESOPHAGOGASTRODUODENOSCOPY (EGD);  Surgeon: Adele Rowe MD;  Location: [facility];  Service: Gastroenterology    PA LAPAROSCOPY SURG CHOLECYSTECTOMY N/A 02/29/2016    Procedure: LAPAROSCOPIC CHOLECYSTECTOMY ;  Surgeon: Cliff Roman DO;  Location: [facility];  Service: General    PA SLCTV CATHJ 3RD+ ORD SLCTV ABDL PEL/LXTR BRNCH Left 9/29/2023    Procedure: Left leg angiogram with intervention;  Surgeon: Michelle Galvan MD;  Location: [facility];  Service: Vascular    PA SLCTV CATHJ 3RD+ ORD SLCTV ABDL PEL/LXTR BRNCH Left 02/26/2024    Procedure: Left leg angiogram antegrade access, left popliteal angioplasty;  Surgeon: Michelle Galvan MD;  Location: [facility];  Service: Vascular    ROTATOR CUFF REPAIR Right     SPINAL CORD STIMULATOR IMPLANT      \"Medtronic interstim model # 3058- in lower back to control bowel movements-currently turned off-battery is dead\"    TOE AMPUTATION Right 10/28/2016    Procedure: 3RD TOE AMPUTATION ;  Surgeon: Anjel Salas DPM;  Location: [facility];  Service:      Social History   Social History     Substance and Sexual Activity   Alcohol Use Never     Social History     Substance and Sexual Activity   Drug Use No     Social History     Tobacco Use   Smoking Status Never   Smokeless Tobacco Never     Family History:   Family History   Problem Relation Age of Onset    Leukemia Mother     Liver disease Mother     Lung cancer Mother         heavy smoker - 3 ppd    Heart disease Father     Liver disease Father     Diabetes type I Father     Multiple " "myeloma Sister     Breast cancer Sister     Urolithiasis Family     Alcohol abuse Neg Hx     Depression Neg Hx     Drug abuse Neg Hx     Substance Abuse Neg Hx     Mental illness Neg Hx        Meds/Allergies   all medications and allergies reviewed  Allergies   Allergen Reactions    Penicillin G Hives     Other Reaction(s): Unknown/HD has no allergy to PCN    Shellfish-Derived Products - Food Allergy Rash     Other reaction(s): Unknown      Other Reaction(s): denies any allergy       Objective   Vitals: Blood pressure 102/85, pulse 75, temperature 98.1 °F (36.7 °C), temperature source Axillary, resp. rate 16, height 5' 9\" (1.753 m), weight 98.6 kg (217 lb 6 oz), SpO2 95%.  Orthostatic Blood Pressures      Flowsheet Row Most Recent Value   Blood Pressure 102/85 filed at 04/22/2025 0446   Patient Position - Orthostatic VS Lying filed at 04/21/2025 1817              Intake/Output Summary (Last 24 hours) at 4/22/2025 0944  Last data filed at 4/22/2025 0501  Gross per 24 hour   Intake 1381 ml   Output 413 ml   Net 968 ml       Invasive Devices       Peripheral Intravenous Line  Duration             Peripheral IV 04/21/25 Right;Upper;Ventral (anterior) Arm 1 day              Line  Duration             Hemodialysis Access Left Upper arm -- days                    Physical Exam  Vitals reviewed.   Constitutional:       General: He is not in acute distress.  HENT:      Head: Normocephalic. Abrasion present.        Nose: Rhinorrhea present.   Eyes:      Extraocular Movements: Extraocular movements intact.      Conjunctiva/sclera: Conjunctivae normal.   Neck:      Vascular: No carotid bruit.   Cardiovascular:      Rate and Rhythm: Normal rate and regular rhythm.      Pulses:           Radial pulses are 1+ on the right side and 1+ on the left side.        Posterior tibial pulses are 1+ on the right side and 1+ on the left side.      Heart sounds: Heart sounds are distant. No murmur heard.  Pulmonary:      Effort: Pulmonary " effort is normal. No respiratory distress.      Breath sounds: Decreased air movement present.   Abdominal:      General: Bowel sounds are normal. There is no distension.      Palpations: Abdomen is soft.      Tenderness: There is no abdominal tenderness.   Musculoskeletal:         General: No swelling or tenderness.      Cervical back: Normal range of motion and neck supple. No tenderness.      Right lower leg: No edema.      Left lower leg: No edema.   Skin:     General: Skin is warm and dry.      Capillary Refill: Capillary refill takes less than 2 seconds.      Findings: Bruising and erythema present.   Neurological:      Mental Status: He is alert. Mental status is at baseline.      Gait: Gait abnormal.      Comments: Pt oriented to person, place and situation, disoriented to time.   Ambulates with walker.   Psychiatric:         Mood and Affect: Mood normal.         Speech: Speech normal.         Behavior: Behavior is cooperative.         Lab Results: CBC with diff:   Results from last 7 days   Lab Units 04/22/25 0619   WBC Thousand/uL 6.80   RBC Million/uL 3.68*   HEMOGLOBIN g/dL 11.6*   HEMATOCRIT % 37.6   MCV fL 102*   MCH pg 31.5   MCHC g/dL 30.9*   RDW % 17.2*   MPV fL 10.7   PLATELETS Thousands/uL 140*     CMP:   Results from last 7 days   Lab Units 04/22/25 0619 04/18/25 2047 04/18/25 0446 04/17/25  1440   SODIUM mmol/L 134*   < > 135  --    CHLORIDE mmol/L 96   < > 95*  --    CO2 mmol/L 26   < > 27  --    CO2, I-STAT mmol/L  --   --   --  21   BUN mg/dL 43*   < > 33*  --    CREATININE mg/dL 7.03*   < > 5.30*  --    GLUCOSE, ISTAT mg/dl  --   --   --  67   CALCIUM mg/dL 8.2*   < > 7.8*  --    AST U/L  --   --  13  --    ALT U/L  --   --  9  --    ALK PHOS U/L  --   --  130*  --    EGFR ml/min/1.73sq m 7   < > 10  --     < > = values in this interval not displayed.     HS Troponin: 130/117/107    BNP:   Results from last 7 days   Lab Units 04/22/25 0619 04/18/25 0446 04/17/25  1440   POTASSIUM  mmol/L 4.8   < >  --    CHLORIDE mmol/L 96   < >  --    CO2 mmol/L 26   < >  --    CO2, I-STAT mmol/L  --   --  21   BUN mg/dL 43*   < >  --    CREATININE mg/dL 7.03*   < >  --    GLUCOSE, ISTAT mg/dl  --   --  67   CALCIUM mg/dL 8.2*   < >  --    EGFR ml/min/1.73sq m 7   < >  --     < > = values in this interval not displayed.     Coags:   Results from last 7 days   Lab Units 04/18/25  0446 04/17/25  1347   PTT seconds  --  38*   INR  1.35* 1.50*       Magnesium:   Results from last 7 days   Lab Units 04/22/25  0619   MAGNESIUM mg/dL 2.1          EKG: Not performed  Pathology and Other Studies: Results Review Statement: I personally reviewed the following image studies/reports in PACS and discussed pertinent findings with Radiology: chest xray, CT abdomen/pelvis, and Echocardiogram.   VTE Prophylaxis: apixaban and prasugrel

## 2025-04-22 NOTE — ASSESSMENT & PLAN NOTE
Recent Labs     04/20/25  0637 04/21/25  0557 04/22/25  0619   HGB 11.3* 10.8* 11.6*     Multifactorial etiology-ESRD, chronic disease  On admission Hb 9.5, s/p 2 unit whole blood for suspected hemorrhagic shock  Folate wnl  B12 384  Started vitamin B12 1000 mcg daily

## 2025-04-22 NOTE — DISCHARGE SUMMARY
Discharge Summary - Hospitalist   Name: Asad Galloway 65 y.o. male I MRN: [id number]  Unit/Bed#: S -01 I Date of Admission: 4/17/2025   Date of Service: 4/24/2025 I Hospital Day: 7    Assessment & Plan  Fall  Per wife patient was waiting in the car while she attended a doctor's appointment.  Patient exit the car without his walker and subsequently fell.  Denies LOC or preceding symptoms   Found hypotensive  Trauma scans without fracture  CTH negative  CT ab/pel: Small subcutaneous hematoma of right hip without active bleed.  Otherwise no acute traumatic injury.  Cirrhosis with portal hypertension and small volume ascites.     Plan   Family declined STR- dc with home health   Mixed hyperlipidemia  Continue home Lipitor 40   Anemia  Recent Labs     04/20/25  0637 04/21/25  0557 04/22/25  0619   HGB 11.3* 10.8* 11.6*     Multifactorial etiology-ESRD, chronic disease  On admission Hb 9.5, s/p 2 unit whole blood for suspected hemorrhagic shock  Folate wnl  B12 384  Started vitamin B12 1000 mcg daily   ESRD on dialysis ([facility])  Lab Results   Component Value Date    EGFR 7 04/22/2025    EGFR 6 04/21/2025    EGFR 8 04/20/2025    CREATININE 7.03 (H) 04/22/2025    CREATININE 7.44 (H) 04/21/2025    CREATININE 6.22 (H) 04/20/2025     MWF schedule at [facility]  Access LUE AVF  Per nephrology, stop taking sensipar given low calcium   F/u nephro op  CAD, multiple vessel  CAD S/P multiple stents with history over the past years, most recently in 2025 2/27/2025 [facility]: Mid circumflex 10%, first obtuse marginal 100%, first %, severe in-stent restenosis of LAD and  of OM1 ([facility])  3/17/2025 [facility] LM normal, proximal LAD 90%, circumflex luminal irregularities, first obtuse marginal 100%, right system not described, successful balloon angioplasty to proximal LAD with 0% residual stenosis ([facility])  Continue metoprolol 25 mg bid   Discontinued entresto per cardiology  Continue prasugrel and apixaban for  AC  Discontinued aspirin  F/u cardio op  Acute hypoxic respiratory failure (HCC)  Patient was noted to be hypoxic at the CT when laid flat and reportedly hypotensive and thus was intubated in the ED.  Reportedly has been coughing for ~1- 2 weeks per wife  COVID flu RSV negative  WBC normal  procal 0.41  Urine antigens not obtained as patient is anuric  CT chest: No large PE, Cardiomegaly with small right pleural effusion and interstitial edema. Cirrhosis with ascites.  CXR: Mild pulmonary vascular congestion with trace pleural effusions  MRSA neg  Repeat CXR mild cardiomegaly, slight increase mild vascular congestion, small r pleural effusion     Plan  Volume management with dialysis  Medrol dose hood to help with post intubation irritation/swelling  Ischemic cardiomyopathy  Echo March 2025 LVEF 35-40%, global hypokinesis with regional variations, moderately reduced RV systolic function, severe left atrial dilation moderate to severely calcified aortic valve with moderate to severe aortic stenosis     -Continue metoprolol 25 mg bid   -Lifevest  -Cariology op f/u  Aortic stenosis  Severe AS, scheduled for TAVR in April 2025 at [facility]   Acute embolism and thrombosis of right peroneal vein (HCC)  Hx of upper extremity DVT in right upper extremity of eliquis 2.5 bid  Eliquis 2.5mg BID  Type 2 diabetes mellitus (HCC)  Lab Results   Component Value Date    HGBA1C 5.4 04/10/2025       Recent Labs     04/21/25  1622 04/21/25  2124 04/22/25  1118 04/22/25  1547   POCGLU 131 109 180* 133       Blood Sugar Average: Last 72 hrs:  (P) 115.75    Controlled  Chronic kidney disease-mineral and bone disorder  Lab Results   Component Value Date    EGFR 7 04/22/2025    EGFR 6 04/21/2025    EGFR 8 04/20/2025    CREATININE 7.03 (H) 04/22/2025    CREATININE 7.44 (H) 04/21/2025    CREATININE 6.22 (H) 04/20/2025     Calcium and magnesium stable  hx hyperphosphatemia.  Binders recently discontinued during recent hospitalization at  [place].  Continue low phosphorus diet  Secondary hyperparathyroidism of renal origin: On Hectorol 2 mcg 3 times a week with HD and Sensipar 90 mg 3 times a week with HD. Per nephro: hold off on sensipar due to hypocalcemia  Continue to monitor and manage as outpatient  HFrEF (heart failure with reduced ejection fraction) ([facility])  Wt Readings from Last 3 Encounters:   04/22/25 98.6 kg (217 lb 6 oz)   04/09/25 100 kg (221 lb)   04/01/25 97.1 kg (214 lb)     Combined systolic and diastolic HF, [facility]  Last echo March 2025- Ef 35-40% with grade III DD, global hypokinesis, severely LA enlargement  Medications;Toprol-XL 25 twice daily, Entresto 24-26 twice daily with Volume management: Dialysis  For this admission volume up with concern for cardiogenic shock     BNP >4700  Trop 130/ 117/107  CXR Mild pulmonary venous congestion with trace pleural effusions.   Repeat CXR mild cardiomegaly, slight increase mild vascular congestion, small r pleural effusion  4/17 echo EF 20%, severe global hypokinesis, ungraded diastolic dysfunction, severe RV dilation moderate AAS, moderate MR moderate TR  Cardiology consulted while in patient. Stopped entresto. Okay with dc with medrol dose hood.   Plan   Volume per Dialysis  Stop taking entresto per cardiology given low BPs  Lifevest  Op cardio f/u  Cirrhosis (HCC)  Known hx cirrhosis. Re-demonstrated on CTA chest/PE 4/17   Hyperkalemia  Recent Labs     04/20/25  0637 04/21/25  0557 04/22/25  0619   K 4.9 5.4* 4.8   MG 2.1 2.3 2.1     Elevated prior to dialysis, resolved  On dialysis   Entresto discontinued per cardiology  Hypotension  Continuously low during admission   Nephrology increased midodrine to 7.5 tid and midrodrine 10 mg before dialysis      Medical Problems       Resolved Problems  Date Reviewed: 4/9/2025          Resolved    Shock (HCC) 4/18/2025     Resolved by  Jeannie Braswell MD        Discharging Physician / Practitioner: Aniyah Guerrero DO  PCP: Mallorie Ulloa,  CRNP  Admission Date:   Admission Orders (From admission, onward)       Ordered        04/17/25 1618  INPATIENT ADMISSION  Once                          Discharge Date: 04/24/25    Consultations During Hospital Stay:  Cardiology   Nephrology     Procedures Performed:   Dialysis     Significant Findings / Test Results:   XR chest portable   Final Result by Dena Arguelles MD (04/19 1952)      Mild pulmonary venous congestion.            Workstation performed: [id number]         XR chest portable ICU   Final Result by Dena Arguelles MD (04/21 0622)      Persistent mild pulmonary venous congestion with nothing to suggest aspiration.            Workstation performed: [id number]         CTA chest pe study   Final Result by María Greenwood MD (04/17 1636)         1. No pulmonary embolism.   2. Cardiomegaly with small right pleural effusion and interstitial edema.   3. Findings concerning for cirrhosis with ascites.                  Workstation performed: [id number]         XR chest 1 view   Final Result by Cydney Frank MD (04/17 1508)   Lines and tubes as above.      Query mild pulmonary edema.      Workstation performed: [id number]         XR chest 1 view   Final Result by Cydney Frank MD (04/17 1510)   Lines and tubes as above.      Query mild pulmonary edema.      Workstation performed: [id number]         TRAUMA - CT head wo contrast   Final Result by Zackery Mccord MD (04/17 5249)      No acute intracranial abnormality.            I personally discussed this study with Saturnino Schwartz on 4/17/2025 2:57 PM.      Workstation performed: [id number]         TRAUMA - CT spine cervical wo contrast   Final Result by Zackery Mccord MD (04/17 4116)      No cervical spine fracture or traumatic malalignment.               I personally discussed this study with Saturnino Schwartz on 4/17/2025 2:57 PM.            Workstation performed: [id number]         TRAUMA - CT facial bones wo contrast   Final Result by Zackery Mccord  MD (04/17 2345)      No fractures.         I personally discussed this study with Jacob Efren on 4/17/2025 2:57 PM.      Workstation performed: [id number]         TRAUMA - CT abdomen pelvis w contrast   Final Result by Zackery Mccord MD (04/17 1501)      1.  Small subcutaneous hematoma in the right hip without evidence of active bleeding. Otherwise no acute traumatic injury in the abdomen or pelvis.   2.  Cirrhosis with portal hypertension. Small volume ascites.      I personally discussed this study with JACOB VARNER on 4/17/2025 2:54 PM.                  Workstation performed: [id number]         XR Trauma multiple (SLB/SLRA trauma bay ONLY)   Final Result by Cydney Frank MD (04/17 1500)   Pulmonary edema pattern. No acute intrathoracic traumatic abnormality.      Normal bowel gas pattern.               Computerized Assisted Algorithm (CAA) may have been used to analyze all applicable images.            Workstation performed: [id number]         XR chest 1 view    (Results Pending)   XR pelvis ap only 1 or 2 vw    (Results Pending)     Anemia with increased MCV  Elevated BNP  Elevated Cr, BUN  Elevated trop   Elevated ph, low calcium     Incidental Findings:   None    Test Results Pending at Discharge (will require follow up):   None     Outpatient Tests Requested:  None    Complications:  None    Reason for Admission: fall    Hospital Course:   Asad Galloway is a 65-year-old male with PMHx HFrEF, ESRD on dialysis, AS, HLD, anemia, multiple vessel CAD, ischemic cardiomyopathy, DM2, cirrhosis that presented to ED 4/17 after fall in parking lot suspected to be related to low BP. Trauma workup negative except for right hip hematoma. Admitted to ICU given shock, received 2 units whole blood and placed on levophed. Became hypoxic requiring intubation. CXR concerning for CHF exacerbation. Echo showed new drop in EF to 20-25%. Cardiology was consulted. Extubated to bipap and weaned to NC then RA. Infectious w/u  "negative. Nephrology consulted for dialysis. Had low Bps, given albumin, midodrine. Metoprolol added back on. Discontinued entresto per cardiology. Nephrology increased midodrine to 7.5 mg tid and 10 mg before dialysis. Sensipar held off by nephro given low calcium. Vitamin B12 was low and supplement added. PT/OT saw patient and rec level 1. Patient denied for ARC. Family did not want STR. Patient having some upper airway irritation likely from post intubation- was dc with medrol dose hood, cleared by cardiology. Patient was given Lifevest. Patient was stable for dc and discharged home.     Please see above list of diagnoses and related plan for additional information.     Condition at Discharge: stable    Discharge Day Visit / Exam:   Subjective:  Patient was seen and examined at bedside this am. Had some spitting up and coughing with wheezing appears to have started after intubation. Denies abdominal pain, diarrhea, fevers/chills. Would like to go home.     Vitals: Blood Pressure: (!) 85/41 (04/24/25 1136)  Pulse: 89 (04/24/25 1136)  Temperature: 98.3 °F (36.8 °C) (04/24/25 0809)  Temp Source: Oral (04/24/25 0809)  Respirations: 18 (04/24/25 0809)  Height: 5' 9\" (175.3 cm) (04/17/25 1619)  Weight - Scale: 98.6 kg (217 lb 6 oz) (04/24/25 0600)  SpO2: 96 % (04/24/25 1136)    Physical Exam  Vitals reviewed.   Constitutional:       Appearance: Normal appearance.   HENT:      Head: Normocephalic and atraumatic.      Right Ear: External ear normal.      Left Ear: External ear normal.      Nose: Nose normal.      Comments: Healing nasal abrasion     Mouth/Throat:      Pharynx: Oropharynx is clear.   Eyes:      Extraocular Movements: Extraocular movements intact.      Conjunctiva/sclera: Conjunctivae normal.      Pupils: Pupils are equal, round, and reactive to light.   Cardiovascular:      Rate and Rhythm: Normal rate and regular rhythm.      Pulses: Normal pulses.      Heart sounds: Normal heart sounds.   Pulmonary:      " Effort: Pulmonary effort is normal.      Breath sounds: Wheezing present.      Comments: Upper airway wheeze, improved in afternoon upon recheck  Abdominal:      General: Abdomen is flat.      Palpations: Abdomen is soft.      Tenderness: There is no abdominal tenderness.   Musculoskeletal:      Cervical back: Neck supple.      Right lower leg: No edema.      Left lower leg: No edema.   Skin:     General: Skin is warm.      Findings: Bruising present.   Neurological:      Mental Status: He is alert. Mental status is at baseline.   Psychiatric:         Thought Content: Thought content normal.      Comments: anxious         Discussion with Family: Updated  (sister) at bedside.Attempted to call wife- no answer.     Discharge instructions/Information to patient and family:   See after visit summary for information provided to patient and family.      Provisions for Follow-Up Care:  See after visit summary for information related to follow-up care and any pertinent home health orders.      Mobility at time of Discharge:   Basic Mobility Inpatient Raw Score: 10  JH-HLM Goal: 4: Move to chair/commode  JH-HLM Achieved: 4: Move to chair/commode  HLM Goal achieved. Continue to encourage appropriate mobility.     Disposition:   Home with VNA Services (Reminder: Complete face to face encounter)    Planned Readmission: No    Discharge Medications:  See after visit summary for reconciled discharge medications provided to patient and/or family.      Administrative Statements   Discharge Statement:  I have spent a total time of 45 minutes in caring for this patient on the day of the visit/encounter.    **Please Note: This note may have been constructed using a voice recognition system**

## 2025-04-22 NOTE — HOSPITAL COURSE
65-year-old male with PMHx HFrEF, ESRD on dialysis, AS, HLD, anemia, multiple vessel CAD, ischemic cardiomyopathy, DM2, cirrhosis that presented to ED 4/17 after fall in parking lot suspected to be related to low BP. Trauma workup negative except for right hip hematoma. Admitted to ICU given shock, received 2 units whole blood and placed on levophed. Became hypoxic requiring intubation. CXR concerning for CHF exacerbation. Echo showed new drop in EF to 20-25%. Cardiology was consulted. Extubated to bipap and weaned to NC then RA. Infectious w/u negative. Nephrology consulted for dialysis. Had low Bps, given albumin, midodrine. Metoprolol added back on. Discontinued entresto per cardiology. PT/OT rec level 2.

## 2025-04-22 NOTE — ASSESSMENT & PLAN NOTE
CAD S/P multiple stents with history over the past years, most recently in 2025 2/27/2025 [facility]: Mid circumflex 10%, first obtuse marginal 100%, first %, severe in-stent restenosis of LAD and  of OM1 ([facility])  3/17/2025 [facility] LM normal, proximal LAD 90%, circumflex luminal irregularities, first obtuse marginal 100%, right system not described, successful balloon angioplasty to proximal LAD with 0% residual stenosis ([facility])  Continue metoprolol 25 mg bid   Discontinued entresto per cardiology  Continue prasugrel and apixaban for AC  Discontinued aspirin  F/u cardio op

## 2025-04-22 NOTE — PLAN OF CARE
Problem: OCCUPATIONAL THERAPY ADULT  Goal: Performs self-care activities at highest level of function for planned discharge setting.  See evaluation for individualized goals.  Description: Treatment Interventions: ADL retraining, Functional transfer training, UE strengthening/ROM, Cognitive reorientation, Patient/family training, Equipment evaluation/education, Compensatory technique education, Neuromuscular reeducation, Continued evaluation, Activityengagement, Energy conservation, Endurance training          See flowsheet documentation for full assessment, interventions and recommendations.   Outcome: Progressing  Note: Limitation: Decreased ADL status, Decreased Safe judgement during ADL, Decreased UE strength, Decreased endurance, Decreased self-care trans, Decreased high-level ADLs, Decreased cognition (trunk control, functional reach, coordination, balance, activity tolerance, safety awareness, insight, attention.)  Prognosis: Good  Assessment: Patient seen for OT treatment on 4/22/2025 s/p admission for Fall Patient agreeable to OT session. Patient participated in fall prevention , self-care transfers, bed mobility , functional mobility, and ADLs with intervention focus on optimizing independence in functional mobility, self-care transfers and ADL tasks. Asad Galloway is showing improvements in activity tolerance, balance, strength, sitting balance but is continuing to perform below baseline due to the following deficits: endurance ,  decreased muscular strength , decreased balance , decreased standing tolerance for self care tasks , decreased dynamic balance impacting functional reach, decreased functional reach , decreased trunk control , decreased activity tolerance , impaired memory , attention to task, impaired judgement and problem solving , impaired safety awareness , impulsive behavior, mood limitation, impaired learning ability d/t current cognitive status , impaired global mental status, and  direction following. Personal factors continuing to impact D/C include: (+) Hx of falls , Assistance needed for ADL/IADLs, Assistance needed for ADLs and functional mobility, High fall risk , decreased insight toward deficits , decreased recall of precautions , health management, and baseline cognitive deficits From OT standpoint, patient would benefit from skilled intervention to maximize independence with ADLs and functional mobility. Goals remain appropriate, continue POC. At this time, recommending D/C to: level II (moderate resource intensity).     Rehab Resource Intensity Level, OT: II (Moderate Resource Intensity)     Chapis Lam MS OTR/L   [place] Licensure# [id number]

## 2025-04-22 NOTE — ASSESSMENT & PLAN NOTE
Lab Results   Component Value Date    HGBA1C 5.4 04/10/2025       Recent Labs     04/21/25  1622 04/21/25  2124 04/22/25  1118 04/22/25  1547   POCGLU 131 109 180* 133       Blood Sugar Average: Last 72 hrs:  (P) 115.75    Controlled

## 2025-04-22 NOTE — ASSESSMENT & PLAN NOTE
Patient was noted to be hypoxic at the CT when laid flat and reportedly hypotensive and thus was intubated in the ED.  Reportedly has been coughing for ~1- 2 weeks per wife  COVID flu RSV negative  WBC normal  procal 0.41  Urine antigens not obtained as patient is anuric  CT chest: No large PE, Cardiomegaly with small right pleural effusion and interstitial edema. Cirrhosis with ascites.  CXR: Mild pulmonary vascular congestion with trace pleural effusions  MRSA neg  Repeat CXR mild cardiomegaly, slight increase mild vascular congestion, small r pleural effusion     Plan  Volume management with dialysis  Medrol dose hood to help with post intubation irritation/swelling

## 2025-04-22 NOTE — PROGRESS NOTES
NEPHROLOGY HOSPITAL PROGRESS NOTE   Asad Galloway 65 y.o. male MRN: [id number]  Unit/Bed#: S -01 Encounter: [id number]  Reason for Consult: ESRD on HD.   Assessment & Plan  ESRD on dialysis ([facility])  [facility]  Access: Left arm AVF.  EDW 96 kg - although wife says it is 95 kg.   Only had 1 hour 45 min HD yesterday.   We were planning for HD today. However, given low BP and stable electrolytes, we will hold off.   Plan for HD tomorrow.     Fall  Defer to primary service.   Patient attempted to walk without his walker which led to a fall.   Low BP probably contributing.     HFrEF (heart failure with reduced ejection fraction) ([facility])  Echo 4/17/25 EF 20%, abnormal diastolic function, AS, AI.   Cardiology following.   BP is low limiting UF on HD.   Entresto being held - will stop altogether.   He is on Metoprolol succ 25 mg BID.   Consider adding Midodrine 5 mg TID - if okay with cards.   Continue Midodrine 10 mg before HD.     Anemia  Hgb is at goal.   He is on Mircera in the outpatient setting.     Chronic kidney disease-mineral and bone disorder  OP Rx: He is on Hectorol 2 mcg and Sensipar 90 mg for 2HPT with HD.   OP Rx: He is on Renvela for hyperphos.   Phos is above goal   Continue Sevelamer 2400 mg TID.   Continue Hectorol 2 mcg with HD.   Hold off on Sensipar due to hypocalcemia.     Aortic stenosis  Followed at [place]  Scheduled for TAVR at [place] mid April 2025.     Acute hypoxic respiratory failure (HCC)  Now improved.     Hyperkalemia  Resolved.   K is 4.8.       TODAY's DISCUSSION / PLAN:  Since electrolytes are stable, will hold off on HD today given low BP.  Consider midodrine 5 mg TID - if okay with cards.   Stop Entresto altogether.     SUBJECTIVE / 24H INTERVAL HISTORY:  BP was noted to be quite low this AM - 73/46.   He denied any CP or SOB.   HD was cut short yesterday due to infiltration of AVF after moving.   He only got 1 hour and 45 mins of HD.     OBJECTIVE:  Current Weight: Weight -  Scale: 98.6 kg (217 lb 6 oz)  Vitals:    04/22/25 0951 04/22/25 0952 04/22/25 0954 04/22/25 1113   BP: (!) 77/34 (!) 77/34 105/58 (!) 97/48   Pulse: 85 87 80 82   Resp:       Temp:       TempSrc:       SpO2: 94% 94% 93% 92%   Weight:       Height:           Intake/Output Summary (Last 24 hours) at 4/22/2025 1240  Last data filed at 4/22/2025 0954  Gross per 24 hour   Intake 1861 ml   Output 413 ml   Net 1448 ml     General: conscious, cooperative, no distress  Skin: dry  Eyes: pink conjunctivae  ENT: moist mucous membranes  Respiratory: equal chest expansion, clear breath sounds.  Cardiovascular: distinct heart sounds, normal rate, regular rhythm  Abdomen: soft, non tender, non distended, normal bowel sounds  Extremities: no edema.   Genitourinary: no chávez catheter.   Neuro: awake, alert.   Psych: appropriate affect    Medications:    Current Facility-Administered Medications:     acetaminophen (TYLENOL) tablet 650 mg, 650 mg, Oral, Q6H PRN, Edward Hanna MD, 650 mg at 04/20/25 0907    apixaban (ELIQUIS) tablet 2.5 mg, 2.5 mg, Oral, BID, Jeannie Braswell MD, 2.5 mg at 04/22/25 1004    atorvastatin (LIPITOR) tablet 40 mg, 40 mg, Oral, Daily With Dinner, Jeannie Braswell MD, 40 mg at 04/21/25 1731    chlorhexidine (PERIDEX) 0.12 % oral rinse 15 mL, 15 mL, Mouth/Throat, Q12H MICH, Jeannie Braswell MD, 15 mL at 04/20/25 0906    [Held by provider] Cholecalciferol (VITAMIN D3) tablet 1,000 Units, 1,000 Units, Oral, Daily, Jeannie Braswell MD    cyanocobalamin (VITAMIN B-12) tablet 1,000 mcg, 1,000 mcg, Oral, Daily, Aniyah Guerrero DO, 1,000 mcg at 04/22/25 1004    doxercalciferol (HECTOROL) injection 2 mcg, 2 mcg, Intravenous, After Dialysis, Ariel Velásquez MD    [Transfer Hold] guaiFENesin (ROBITUSSIN) oral liquid 200 mg, 200 mg, Oral, Q4H PRN, BRITTANY Wellington, 200 mg at 04/18/25 1732    insulin lispro (HumALOG/ADMELOG) 100 units/mL subcutaneous injection 1-6 Units, 1-6 Units, Subcutaneous, 4x Daily (PC & HS), 1  Units at 04/22/25 1214 **AND** Fingerstick Glucose (POCT), , , 4x Daily AC and at bedtime, Jeannie Braswell MD    levalbuterol (XOPENEX) inhalation solution 0.63 mg, 0.63 mg, Nebulization, Q6H PRN, Jeannie Braswell MD, 0.63 mg at 04/17/25 1758    lidocaine (LIDODERM) 5 % patch 1 patch, 1 patch, Topical, Daily, Jeannie Braswell MD, 1 patch at 04/22/25 1004    [Held by provider] melatonin tablet 3 mg, 3 mg, Oral, HS, Jeannie Braswell MD    metoprolol succinate (TOPROL-XL) 24 hr tablet 25 mg, 25 mg, Oral, BID, Rayo Nascimento DO, 25 mg at 04/21/25 0913    midodrine (PROAMATINE) tablet 10 mg, 10 mg, Oral, Before Dialysis, Jeannie Braswell MD, 10 mg at 04/21/25 1217    prasugrel (EFFIENT) tablet 10 mg, 10 mg, Oral, Daily, Jeannie Braswell MD, 10 mg at 04/22/25 1005    [Held by provider] sacubitril-valsartan (ENTRESTO) 24-26 MG per tablet 1 tablet, 1 tablet, Oral, BID, Jeannie Braswell MD    sevelamer (RENAGEL) oral suspension 2,400 mg, 2,400 mg, Oral, TID With Meals, Ariel Velásquez MD, 2,400 mg at 04/22/25 1003    trimethobenzamide (TIGAN) IM injection 200 mg, 200 mg, Intramuscular, Q6H PRN, Edward Hanna MD, 200 mg at 04/22/25 1004    Laboratory Results:  Results from last 7 days   Lab Units 04/22/25  0619 04/21/25  0557 04/20/25  0637 04/19/25  0627 04/18/25  2047 04/18/25  0446 04/17/25  1440 04/17/25  1419 04/17/25  1348 04/17/25  1348 04/17/25  1347   WBC Thousand/uL 6.80 7.26 6.67 6.70 6.79 4.65  --   --   --   --  5.07   HEMOGLOBIN g/dL 11.6* 10.8* 11.3* 11.6* 11.3* 10.9*  --   --   --   --  9.5*   I STAT HEMOGLOBIN g/dl  --   --   --   --   --   --  10.2* 9.9*   < > 10.5*  --    HEMATOCRIT % 37.6 35.5* 35.7* 37.2 36.0* 34.7*  --   --   --   --  31.3*   HEMATOCRIT, ISTAT %  --   --   --   --   --   --  30* 29*   < > 31*  --    PLATELETS Thousands/uL 140* 144* 143* 122* 119* 106*  --   --   --   --  108*   POTASSIUM mmol/L 4.8 5.4* 4.9 4.6 4.6 5.2  --   --   --   --  4.3   CHLORIDE mmol/L 96 95* 95* 96 96 95*  --   --   --   " --  96   CO2 mmol/L 26 24 27 27 28 27  --   --   --   --  32   CO2, I-STAT mmol/L  --   --   --   --   --   --  21 31   < > 33*  --    BUN mg/dL 43* 54* 50* 36* 28* 33*  --   --   --   --  25   CREATININE mg/dL 7.03* 7.44* 6.22* 4.92* 4.36* 5.30*  --   --   --   --  4.62*   CALCIUM mg/dL 8.2* 8.0* 7.8* 8.0* 8.0* 7.8*  --   --   --   --  8.0*   MAGNESIUM mg/dL 2.1 2.3 2.1 2.0 2.0 2.0  --   --   --   --   --    PHOSPHORUS mg/dL 5.8* 6.4* 5.9* 5.6* 5.5* 6.3*  --   --   --   --   --    GLUCOSE, ISTAT mg/dl  --   --   --   --   --   --  67 79  --  89  --     < > = values in this interval not displayed.     Portions of the record may have been created with voice recognition software. Occasional wrong word or \"sound a like\" substitutions may have occurred due to the inherent limitations of voice recognition software. Read the chart carefully and recognize, using context, where substitutions have occurred.If you have any questions, please contact the dictating provider.    "

## 2025-04-22 NOTE — ASSESSMENT & PLAN NOTE
Echo March 2025 LVEF 35-40%, global hypokinesis with regional variations, moderately reduced RV systolic function, severe left atrial dilation moderate to severely calcified aortic valve with moderate to severe aortic stenosis     -Continue metoprolol 25 mg bid   -Lifevest in patient's room ready for dc   -Cariology recs appreciated

## 2025-04-22 NOTE — ASSESSMENT & PLAN NOTE
Echo 4/17/25 EF 20%, abnormal diastolic function, AS, AI.   Cardiology following.   BP is low limiting UF on HD.   Entresto being held - will stop altogether.   He is on Metoprolol succ 25 mg BID.   Consider adding Midodrine 5 mg TID - if okay with cards.   Continue Midodrine 10 mg before HD.

## 2025-04-22 NOTE — ASSESSMENT & PLAN NOTE
Lab Results   Component Value Date    HGBA1C 5.4 04/10/2025       Recent Labs     04/21/25  1209 04/21/25  1546 04/21/25  1622 04/21/25 2124   POCGLU 105 64* 131 109       Blood Sugar Average: Last 72 hrs:  (P) 109.0921439832791881    Controlled  Will continue SSI  Hypoglycemia protocol

## 2025-04-22 NOTE — ASSESSMENT & PLAN NOTE
CAD S/P multiple stents with history over the past years, most recently in 2025 2/27/2025 [facility]: Mid circumflex 10%, first obtuse marginal 100%, first %, severe in-stent restenosis of LAD and  of OM1 ([facility])  3/17/2025 [facility] LM normal, proximal LAD 90%, circumflex luminal irregularities, first obtuse marginal 100%, right system not described, successful balloon angioplasty to proximal LAD with 0% residual stenosis ([facility])  Continue metoprolol 25 mg bid   Discontinued entresto per cardiology  Continue prasugrel and apixaban for AC

## 2025-04-23 ENCOUNTER — APPOINTMENT (INPATIENT)
Dept: DIALYSIS | Facility: HOSPITAL | Age: 65
DRG: 314 | End: 2025-04-23
Attending: INTERNAL MEDICINE
Payer: MEDICARE

## 2025-04-23 ENCOUNTER — APPOINTMENT (INPATIENT)
Dept: RADIOLOGY | Facility: HOSPITAL | Age: 65
DRG: 314 | End: 2025-04-23
Payer: MEDICARE

## 2025-04-23 PROBLEM — I95.9 HYPOTENSION: Status: RESOLVED | Noted: 2025-04-17 | Resolved: 2025-04-18

## 2025-04-23 PROBLEM — I95.9 HYPOTENSION: Status: ACTIVE | Noted: 2025-04-17

## 2025-04-23 LAB
ANION GAP SERPL CALCULATED.3IONS-SCNC: 14 MMOL/L (ref 4–13)
BASOPHILS # BLD AUTO: 0.04 THOUSANDS/ÂΜL (ref 0–0.1)
BASOPHILS NFR BLD AUTO: 1 % (ref 0–1)
BUN SERPL-MCNC: 48 MG/DL (ref 5–25)
CALCIUM SERPL-MCNC: 8.4 MG/DL (ref 8.4–10.2)
CHLORIDE SERPL-SCNC: 95 MMOL/L (ref 96–108)
CO2 SERPL-SCNC: 26 MMOL/L (ref 21–32)
CREAT SERPL-MCNC: 8.09 MG/DL (ref 0.6–1.3)
EOSINOPHIL # BLD AUTO: 0.15 THOUSAND/ÂΜL (ref 0–0.61)
EOSINOPHIL NFR BLD AUTO: 2 % (ref 0–6)
ERYTHROCYTE [DISTWIDTH] IN BLOOD BY AUTOMATED COUNT: 17.2 % (ref 11.6–15.1)
GFR SERPL CREATININE-BSD FRML MDRD: 6 ML/MIN/1.73SQ M
GLUCOSE SERPL-MCNC: 128 MG/DL (ref 65–140)
GLUCOSE SERPL-MCNC: 186 MG/DL (ref 65–140)
GLUCOSE SERPL-MCNC: 79 MG/DL (ref 65–140)
GLUCOSE SERPL-MCNC: 82 MG/DL (ref 65–140)
GLUCOSE SERPL-MCNC: 93 MG/DL (ref 65–140)
HCT VFR BLD AUTO: 36.4 % (ref 36.5–49.3)
HGB BLD-MCNC: 11.7 G/DL (ref 12–17)
IMM GRANULOCYTES # BLD AUTO: 0.06 THOUSAND/UL (ref 0–0.2)
IMM GRANULOCYTES NFR BLD AUTO: 1 % (ref 0–2)
LYMPHOCYTES # BLD AUTO: 0.95 THOUSANDS/ÂΜL (ref 0.6–4.47)
LYMPHOCYTES NFR BLD AUTO: 14 % (ref 14–44)
MAGNESIUM SERPL-MCNC: 2.2 MG/DL (ref 1.9–2.7)
MCH RBC QN AUTO: 31.4 PG (ref 26.8–34.3)
MCHC RBC AUTO-ENTMCNC: 32.1 G/DL (ref 31.4–37.4)
MCV RBC AUTO: 98 FL (ref 82–98)
MONOCYTES # BLD AUTO: 0.57 THOUSAND/ÂΜL (ref 0.17–1.22)
MONOCYTES NFR BLD AUTO: 8 % (ref 4–12)
NEUTROPHILS # BLD AUTO: 5.17 THOUSANDS/ÂΜL (ref 1.85–7.62)
NEUTS SEG NFR BLD AUTO: 74 % (ref 43–75)
NRBC BLD AUTO-RTO: 0 /100 WBCS
PHOSPHATE SERPL-MCNC: 6.5 MG/DL (ref 2.3–4.1)
PLATELET # BLD AUTO: 154 THOUSANDS/UL (ref 149–390)
PMV BLD AUTO: 10.6 FL (ref 8.9–12.7)
POTASSIUM SERPL-SCNC: 4.9 MMOL/L (ref 3.5–5.3)
RBC # BLD AUTO: 3.73 MILLION/UL (ref 3.88–5.62)
SODIUM SERPL-SCNC: 135 MMOL/L (ref 135–147)
WBC # BLD AUTO: 6.94 THOUSAND/UL (ref 4.31–10.16)

## 2025-04-23 PROCEDURE — 83735 ASSAY OF MAGNESIUM: CPT

## 2025-04-23 PROCEDURE — 99232 SBSQ HOSP IP/OBS MODERATE 35: CPT | Performed by: STUDENT IN AN ORGANIZED HEALTH CARE EDUCATION/TRAINING PROGRAM

## 2025-04-23 PROCEDURE — 82948 REAGENT STRIP/BLOOD GLUCOSE: CPT

## 2025-04-23 PROCEDURE — 97530 THERAPEUTIC ACTIVITIES: CPT

## 2025-04-23 PROCEDURE — 90935 HEMODIALYSIS ONE EVALUATION: CPT | Performed by: INTERNAL MEDICINE

## 2025-04-23 PROCEDURE — 71045 X-RAY EXAM CHEST 1 VIEW: CPT

## 2025-04-23 PROCEDURE — 84100 ASSAY OF PHOSPHORUS: CPT

## 2025-04-23 PROCEDURE — NC001 PR NO CHARGE

## 2025-04-23 PROCEDURE — 85025 COMPLETE CBC W/AUTO DIFF WBC: CPT

## 2025-04-23 PROCEDURE — 80048 BASIC METABOLIC PNL TOTAL CA: CPT

## 2025-04-23 RX ORDER — ALBUTEROL SULFATE 90 UG/1
2 INHALANT RESPIRATORY (INHALATION) EVERY 4 HOURS PRN
Status: DISCONTINUED | OUTPATIENT
Start: 2025-04-23 | End: 2025-04-24 | Stop reason: HOSPADM

## 2025-04-23 RX ORDER — FAMOTIDINE 20 MG/1
20 TABLET, FILM COATED ORAL DAILY
Status: DISCONTINUED | OUTPATIENT
Start: 2025-04-23 | End: 2025-04-24 | Stop reason: HOSPADM

## 2025-04-23 RX ORDER — BENZONATATE 100 MG/1
100 CAPSULE ORAL 3 TIMES DAILY PRN
Status: DISCONTINUED | OUTPATIENT
Start: 2025-04-23 | End: 2025-04-24 | Stop reason: HOSPADM

## 2025-04-23 RX ADMIN — FAMOTIDINE 20 MG: 20 TABLET, FILM COATED ORAL at 12:54

## 2025-04-23 RX ADMIN — MIDODRINE HYDROCHLORIDE 5 MG: 5 TABLET ORAL at 20:00

## 2025-04-23 RX ADMIN — CYANOCOBALAMIN TAB 500 MCG 1000 MCG: 500 TAB at 12:47

## 2025-04-23 RX ADMIN — TRIMETHOBENZAMIDE HYDROCHLORIDE 200 MG: 100 INJECTION INTRAMUSCULAR at 01:05

## 2025-04-23 RX ADMIN — TRIMETHOBENZAMIDE HYDROCHLORIDE 200 MG: 100 INJECTION INTRAMUSCULAR at 10:19

## 2025-04-23 RX ADMIN — ATORVASTATIN CALCIUM 40 MG: 40 TABLET, FILM COATED ORAL at 17:01

## 2025-04-23 RX ADMIN — Medication 1000 UNITS: at 12:47

## 2025-04-23 RX ADMIN — DOXERCALCIFEROL 2 MCG: 4 INJECTION, SOLUTION INTRAVENOUS at 18:05

## 2025-04-23 RX ADMIN — MIDODRINE HYDROCHLORIDE 10 MG: 5 TABLET ORAL at 09:13

## 2025-04-23 RX ADMIN — PRASUGREL 10 MG: 10 TABLET, FILM COATED ORAL at 12:48

## 2025-04-23 RX ADMIN — APIXABAN 2.5 MG: 2.5 TABLET, FILM COATED ORAL at 17:01

## 2025-04-23 RX ADMIN — METOPROLOL SUCCINATE 25 MG: 25 TABLET, EXTENDED RELEASE ORAL at 12:47

## 2025-04-23 RX ADMIN — INSULIN LISPRO 1 UNITS: 100 INJECTION, SOLUTION INTRAVENOUS; SUBCUTANEOUS at 22:15

## 2025-04-23 RX ADMIN — SEVELAMER HYDROCHLORIDE 2400 MG: 800 TABLET ORAL at 12:54

## 2025-04-23 RX ADMIN — CHLORHEXIDINE GLUCONATE 15 ML: 1.2 SOLUTION ORAL at 20:00

## 2025-04-23 RX ADMIN — MIDODRINE HYDROCHLORIDE 5 MG: 5 TABLET ORAL at 17:01

## 2025-04-23 RX ADMIN — CHLORHEXIDINE GLUCONATE 15 ML: 1.2 SOLUTION ORAL at 12:47

## 2025-04-23 RX ADMIN — METOPROLOL SUCCINATE 25 MG: 25 TABLET, EXTENDED RELEASE ORAL at 17:01

## 2025-04-23 RX ADMIN — SEVELAMER HYDROCHLORIDE 2400 MG: 800 TABLET ORAL at 17:01

## 2025-04-23 RX ADMIN — APIXABAN 2.5 MG: 2.5 TABLET, FILM COATED ORAL at 09:12

## 2025-04-23 RX ADMIN — LIDOCAINE 5% 1 PATCH: 700 PATCH TOPICAL at 12:47

## 2025-04-23 NOTE — ASSESSMENT & PLAN NOTE
OP Rx: He is on Hectorol 2 mcg and Sensipar 90 mg for 2HPT with HD.   OP Rx: He is on Renvela for hyperphos.   Phos is above goal   Continue Sevelamer 2400 mg TID for hyperphos.   Continue Hectorol 2 mcg with HD.   Hold off on Sensipar due to hypocalcemia.

## 2025-04-23 NOTE — PLAN OF CARE
Hemodialysis treatment planned for 240 minutes using a 2 K+ bath for potassium 4.9 this morning.  Fluid goal 2000 ml/1500 ml net as tolerated to end at EDW 96 kg.  Treatment review with Dr. Velásquez via Epic Chat.          Post-Dialysis RN Treatment Note    Blood Pressure:  Pre 124/59 mm/Hg  Post 126/50 mmHg   EDW:  96 kg    Weight:  Pre 97.3 kg   Post 96.5 kg   Mode of weight measurement: Bed Scale   Volume Removed:  1471 ml/1000 ml net   Treatment duration: 190 minutes    NS given:  No    Treatment shortened Yes, describe: System clotting   Medications given during Rx: Hectorol 2 mcg   Estimated Kt/V:  None Reported   Access type: AV fistula   Needle Gauge: 15 guage   Access Issues: Yes, describe: Patient restless and moving left arm     Report called to primary nurse:   Yes             Problem: METABOLIC, FLUID AND ELECTROLYTES - ADULT  Goal: Electrolytes maintained within normal limits  Description: INTERVENTIONS:- Monitor labs and assess patient for signs and symptoms of electrolyte imbalances- Administer electrolyte replacement as ordered- Monitor response to electrolyte replacements, including repeat lab results as appropriate- Instruct patient on fluid and nutrition as appropriate  Outcome: Progressing  Goal: Fluid balance maintained  Description: INTERVENTIONS:- Monitor labs - Monitor I/O and WT- Instruct patient on fluid and nutrition as appropriate- Assess for signs & symptoms of volume excess or deficit  Outcome: Progressing

## 2025-04-23 NOTE — ARC ADMISSION
After reviewing updated therapy notes, patient is denied for ARC.  There are concerns regarding his ability to tolerate the acute rehab program.  His BP continues to drop and he is not able to do anything more than transfers with therapy.  A longer, slower paced therapy program is recommended for this patient.
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
## 2025-04-23 NOTE — ASSESSMENT & PLAN NOTE
Lab Results   Component Value Date    HGBA1C 5.4 04/10/2025       Recent Labs     04/22/25  1547 04/22/25  2058 04/23/25  0757 04/23/25  1122   POCGLU 133 112 79 93       Blood Sugar Average: Last 72 hrs:  (P) [id number]    Controlled  Will continue SSI  Hypoglycemia protocol

## 2025-04-23 NOTE — ASSESSMENT & PLAN NOTE
BP has been continuously low during this admission.    Current Rx: Midodrine 5 mg TID.   BP better with midodrine.

## 2025-04-23 NOTE — PLAN OF CARE
Problem: PHYSICAL THERAPY ADULT  Goal: Performs mobility at highest level of function for planned discharge setting.  See evaluation for individualized goals.  Description: Treatment/Interventions: Functional transfer training, LE strengthening/ROM, Therapeutic exercise, Endurance training, Patient/family training, Equipment eval/education, Bed mobility, Gait training, Cognitive reorientation          See flowsheet documentation for full assessment, interventions and recommendations.  Outcome: Not Progressing  Note: Prognosis: Fair  Problem List: Decreased strength, Decreased endurance, Impaired balance, Decreased mobility, Decreased cognition, Impaired judgement, Decreased safety awareness  Assessment: pt began tx session lying supine in the bed as pt was agreeable to participate in PT interventions. Upon arrival to pt room pt with bowel incontinence that required cleaning and changing of pt and bed linen. pt required mod Ax1 for all rolling and repositioning in the bed from L to R with LE and trunk management. pt continues to require max ax1 for completing a supine<>sit EOB transfer with LE and trunk management. pt was able to sit EOB w/o LOB 6 minutes while being educated on transfers w/ RW. pt completed 1 STS in todays vtx session with RW and mod ax1 to complete transfer. andrea PCA replaced bed linen. While seated EOB pt reports light headedness and dizziness BP taking 82/45, RN made ware and RN requested pt be placed back in bed. pt required max ax2 for returning to supine position with LE and trunk mangagement. pt continues to be functioning below his baseline level and would benefit from continued skilled PT intervention in order to address deficits listed above. Continue to recommend DC w/ level 1 maximal rehab resource intensity when medically cleared        Rehab Resource Intensity Level, PT: I (Maximum Resource Intensity)    See flowsheet documentation for full assessment.

## 2025-04-23 NOTE — ASSESSMENT & PLAN NOTE
Echo 4/17/25 EF 20%, abnormal diastolic function, AS, AI.   Cardiology following.   BP is low limiting UF on HD.   Entresto stopped this admission due to low BP.   Current Rx: Metoprolol succ 25 mg BID.

## 2025-04-23 NOTE — ASSESSMENT & PLAN NOTE
Lab Results   Component Value Date    EGFR 6 04/23/2025    EGFR 7 04/22/2025    EGFR 6 04/21/2025    CREATININE 8.09 (H) 04/23/2025    CREATININE 7.03 (H) 04/22/2025    CREATININE 7.44 (H) 04/21/2025     Calcium and magnesium stable  hx hyperphosphatemia.  Binders recently discontinued during recent hospitalization at [facility].  Continue low phosphorus diet  Secondary hyperparathyroidism of renal origin: On Hectorol 2 mcg 3 times a week with HD and Sensipar 90 mg 3 times a week with HD  Continue to monitor and manage as outpatient

## 2025-04-23 NOTE — PROGRESS NOTES
NEPHROLOGY HOSPITAL PROGRESS NOTE   Asad Galloway 65 y.o. male MRN: [id number]  Unit/Bed#: S -01 Encounter: [id number]  Reason for Consult: ESRD on HD.   Assessment & Plan  ESRD on dialysis (Formerly [facility])  [facility]  Access: Left arm AVF.  EDW 96 kg - although wife says it is 95 kg.   Electrolytes are stable overall.  Regular HD today.    Fall  Defer to primary service.   Patient attempted to walk without his walker which led to a fall.   Low BP probably contributing.     HFrEF (heart failure with reduced ejection fraction) (Formerly [facility])  Echo 4/17/25 EF 20%, abnormal diastolic function, AS, AI.   Cardiology following.   BP is low limiting UF on HD.   Entresto stopped this admission due to low BP.   Current Rx: Metoprolol succ 25 mg BID.     Hypotension  BP has been continuously low during this admission.    Current Rx: Midodrine 5 mg TID.   BP better with midodrine.     Anemia  Hgb is at goal.   He is on Mircera in the outpatient setting.     Chronic kidney disease-mineral and bone disorder  OP Rx: He is on Hectorol 2 mcg and Sensipar 90 mg for 2HPT with HD.   OP Rx: He is on Renvela for hyperphos.   Phos is above goal   Continue Sevelamer 2400 mg TID for hyperphos.   Continue Hectorol 2 mcg with HD.   Hold off on Sensipar due to hypocalcemia.     Aortic stenosis  Followed at [place]  Scheduled for TAVR at [place] mid April 2025.     Acute hypoxic respiratory failure (HCC)  Now improved.       TODAY's DISCUSSION / PLAN:  Regular HD today.  1.5 kg UF on HD today - should be down to 95.7 kg after HD.   Continue Midodrine for BP support.     SUBJECTIVE / 24H INTERVAL HISTORY:  BP is better with RTC midodrine.  Having dysphagia and coughing after swallowing.   No CP or SOB.     HEMODIALYSIS PROCEDURE NOTE  The patient was seen and examined on hemodialysis.  Time: 4 hours  Sodium: 137 Blood flow: 450   Dialyzer: F180 Potassium: 2 Dialysate flow: 800   Access: L arm AVF Bicarbonate: 31 Ultrafiltration goal: 1.5 kg.    Medications on HD: Midodrine 10 mg, Hectorol 2 mcg     OBJECTIVE:  Current Weight: Weight - Scale: 98.9 kg (218 lb 0.6 oz)  Vitals:    04/23/25 0905 04/23/25 0910 04/23/25 1000 04/23/25 1030   BP:  (!) 116/47 104/54 103/66   BP Location:       Pulse: 83 83 86 95   Resp: 18 18 18 18   Temp:       TempSrc:       SpO2:       Weight:       Height:           Intake/Output Summary (Last 24 hours) at 4/23/2025 1140  Last data filed at 4/23/2025 0900  Gross per 24 hour   Intake 570 ml   Output 0 ml   Net 570 ml     General: conscious, cooperative, no distress  Skin: dry  Eyes: pink conjunctivae  ENT: moist mucous membranes  Respiratory: equal chest expansion, clear breath sounds.  Cardiovascular: distinct heart sounds, normal rate, regular rhythm, no rub  Abdomen: soft, non tender, non distended, normal bowel sounds  Extremities: no edema.   Genitourinary: no chávez catheter.   Neuro: awake, alert.   Psych: anxious.     Medications:    Current Facility-Administered Medications:     acetaminophen (TYLENOL) tablet 650 mg, 650 mg, Oral, Q6H PRN, Edward Hanna MD, 650 mg at 04/20/25 0907    apixaban (ELIQUIS) tablet 2.5 mg, 2.5 mg, Oral, BID, Jeannie Braswell MD, 2.5 mg at 04/23/25 0912    atorvastatin (LIPITOR) tablet 40 mg, 40 mg, Oral, Daily With Dinner, Jeannie Braswell MD, 40 mg at 04/21/25 1731    chlorhexidine (PERIDEX) 0.12 % oral rinse 15 mL, 15 mL, Mouth/Throat, Q12H MICH, Jeannie Braswell MD, 15 mL at 04/20/25 0906    Cholecalciferol (VITAMIN D3) tablet 1,000 Units, 1,000 Units, Oral, Daily, Ariel Velásquez MD    cyanocobalamin (VITAMIN B-12) tablet 1,000 mcg, 1,000 mcg, Oral, Daily, Aniyah Guerrero DO, 1,000 mcg at 04/22/25 1004    doxercalciferol (HECTOROL) injection 2 mcg, 2 mcg, Intravenous, After Dialysis, Ariel Velásquez MD    [Transfer Hold] guaiFENesin (ROBITUSSIN) oral liquid 200 mg, 200 mg, Oral, Q4H PRN, BRITTANY Wellington, 200 mg at 04/18/25 0454    insulin lispro (HumALOG/ADMELOG) 100 units/mL  subcutaneous injection 1-6 Units, 1-6 Units, Subcutaneous, 4x Daily (PC & HS), 1 Units at 04/22/25 1214 **AND** Fingerstick Glucose (POCT), , , 4x Daily AC and at bedtime, Jeannie Braswell MD    levalbuterol (XOPENEX) inhalation solution 0.63 mg, 0.63 mg, Nebulization, Q6H PRN, Jeannie Braswell MD, 0.63 mg at 04/17/25 1758    lidocaine (LIDODERM) 5 % patch 1 patch, 1 patch, Topical, Daily, Jeannie Braswell MD, 1 patch at 04/22/25 1004    [Held by provider] melatonin tablet 3 mg, 3 mg, Oral, HS, Jeannie Braswell MD    metoprolol succinate (TOPROL-XL) 24 hr tablet 25 mg, 25 mg, Oral, BID, Rayo Nascimento DO, 25 mg at 04/21/25 0913    midodrine (PROAMATINE) tablet 10 mg, 10 mg, Oral, Before Dialysis, Jeannie Braswell MD, 10 mg at 04/23/25 0913    midodrine (PROAMATINE) tablet 5 mg, 5 mg, Oral, TID, Ariel Velásquez MD, 5 mg at 04/22/25 2153    NON FORMULARY, 2,400 mg, Oral, TID With Meals, Aniyah Guerrero DO    prasugrel (EFFIENT) tablet 10 mg, 10 mg, Oral, Daily, Jeannie Braswell MD, 10 mg at 04/22/25 1005    sevelamer (RENAGEL) oral suspension 2,400 mg, 2,400 mg, Oral, TID With Meals, Ariel Velásquez MD, 2,400 mg at 04/22/25 1003    trimethobenzamide (TIGAN) IM injection 200 mg, 200 mg, Intramuscular, Q6H PRN, Edward Hanna MD, 200 mg at 04/23/25 1019    Laboratory Results:  Results from last 7 days   Lab Units 04/23/25  0459 04/22/25  0619 04/21/25  0557 04/20/25  0637 04/19/25  0627 04/18/25  2047 04/18/25  0446 04/17/25  1440 04/17/25  1419 04/17/25  1348   WBC Thousand/uL 6.94 6.80 7.26 6.67 6.70 6.79 4.65  --   --   --    HEMOGLOBIN g/dL 11.7* 11.6* 10.8* 11.3* 11.6* 11.3* 10.9*  --   --   --    I STAT HEMOGLOBIN g/dl  --   --   --   --   --   --   --  10.2* 9.9* 10.5*   HEMATOCRIT % 36.4* 37.6 35.5* 35.7* 37.2 36.0* 34.7*  --   --   --    HEMATOCRIT, ISTAT %  --   --   --   --   --   --   --  30* 29* 31*   PLATELETS Thousands/uL 154 140* 144* 143* 122* 119* 106*  --   --   --    POTASSIUM mmol/L 4.9 4.8 5.4* 4.9  "4.6 4.6 5.2  --   --   --    CHLORIDE mmol/L 95* 96 95* 95* 96 96 95*  --   --   --    CO2 mmol/L 26 26 24 27 27 28 27  --   --   --    CO2, I-STAT mmol/L  --   --   --   --   --   --   --  21 31 33*   BUN mg/dL 48* 43* 54* 50* 36* 28* 33*  --   --   --    CREATININE mg/dL 8.09* 7.03* 7.44* 6.22* 4.92* 4.36* 5.30*  --   --   --    CALCIUM mg/dL 8.4 8.2* 8.0* 7.8* 8.0* 8.0* 7.8*  --   --   --    MAGNESIUM mg/dL 2.2 2.1 2.3 2.1 2.0 2.0 2.0  --   --   --    PHOSPHORUS mg/dL 6.5* 5.8* 6.4* 5.9* 5.6* 5.5* 6.3*  --   --   --    GLUCOSE, ISTAT mg/dl  --   --   --   --   --   --   --  67 79 89     Portions of the record may have been created with voice recognition software. Occasional wrong word or \"sound a like\" substitutions may have occurred due to the inherent limitations of voice recognition software. Read the chart carefully and recognize, using context, where substitutions have occurred.If you have any questions, please contact the dictating provider.    "

## 2025-04-23 NOTE — ASSESSMENT & PLAN NOTE
Recent Labs     04/21/25  0557 04/22/25  0619 04/23/25  0459   K 5.4* 4.8 4.9   MG 2.3 2.1 2.2     Elevated prior to dialysis, resolved  On dialysis   Entresto discontinued per cardiology

## 2025-04-23 NOTE — ASSESSMENT & PLAN NOTE
Patient was noted to be hypoxic at the CT when laid flat and reportedly hypotensive and thus was intubated in the ED.  Reportedly has been coughing for ~1- 2 weeks per wife  COVID flu RSV negative  WBC normal  procal 0.41  Urine antigens not obtained as patient is anuric  CT chest: No large PE, Cardiomegaly with small right pleural effusion and interstitial edema. Cirrhosis with ascites.  CXR: Mild pulmonary vascular congestion with trace pleural effusions  MRSA neg     Plan  Monitoring off ABX  Volume management with dialysis  Airway clearance protocol   4/23 cough with phlegm- likely irritation from intubation, added on mucinex, tessalon perles, pepcid, CXR pending

## 2025-04-23 NOTE — ASSESSMENT & PLAN NOTE
Wt Readings from Last 3 Encounters:   04/23/25 98.9 kg (218 lb 0.6 oz)   04/09/25 100 kg (221 lb)   04/01/25 97.1 kg (214 lb)     Combined systolic and diastolic HF, [facility]  Last echo March 2025- Ef 35-40% with grade III DD, global hypokinesis, severely LA enlargement  Medications;Toprol-XL 25 twice daily, Entresto 24-26 twice daily with Volume management: Dialysis  For this admission volume up with concern for cardiogenic shock     BNP >4700  Trop 130/ 117/107  CXR Mild pulmonary venous congestion with trace pleural effusions.   4/17 echo EF 20%, severe global hypokinesis, ungraded diastolic dysfunction, severe RV dilation moderate AAS, moderate MR moderate TR  Plan   Volume per Dialysis  Resume home medications as able  Cardiology consulted for new reduction in EF, recommendations appreciated  Lifevest assessment completed 4/19, life vest in patient's room

## 2025-04-23 NOTE — ASSESSMENT & PLAN NOTE
CAD S/P multiple stents with history over the past years, most recently in 2025 2/27/2025 [facility]: Mid circumflex 10%, first obtuse marginal 100%, first %, severe in-stent restenosis of LAD and  of OM1 ([facility])  3/17/2025 [facility] LM normal, proximal LAD 90%, circumflex luminal irregularities, first obtuse marginal 100%, right system not described, successful balloon angioplasty to proximal LAD with 0% residual stenosis ([facility])  Continue metoprolol 25 mg bid   Discontinued entresto per cardiology  Continue prasugrel and apixaban for AC

## 2025-04-23 NOTE — PROGRESS NOTES
Plan:  Multivessel CAD s/p PCI:  - continue metoprolol 25 mg BID, discontinue entresto  - continue prasugrel and apixaban for anticoagulation  - continue to closely monitor blood pressures OP to avoid hypotension     Ischemic cardiomyopathy, new reduced EF 20%:  - continue metoprolol 25 mg BID  - LifeVest ordered for discharge, at bedside ready for patient when discharged  - continue daily weights, I/Os while inpatient  - continue daily weights, low Na diet OP  - close OP cardiology follow-up     Moderate AS:  - awaiting TAVR by [place] in April 29, 2025- per wife  - continue to follow with cardiology OP     ESRD on HD:  - continue management per nephrology  - close monitoring for hypotension pre/post dialysis  - continue prn midodrine 10 mg PO before dialysis to prevent hypotension    Hypotension:  - midrodrine 5 mg BID was added by nephrology for BP support  - continue per nephrology    Fall, initial encounter  Acute hypoxemic respiratory failure  Thrombosis right peroneal vein on eliquis  Anemia       Subjective   Patient reports he was feeling nauseous this morning and vomited. He was given IM tigan. He reports now he is feeling hungry because he didn't eat his breakfast. He denies any abdominal pain or bloating. He denies any chest pain, pressure, SOB. Wife is at bedside expressing concern for patient's nausea and vomiting.     HPI:    Asad Galloway is a 65 y.o. male with PMH of CHF, ESRD on dialysis, CAD s/p recent restenting at [place], ischemic cardiomyopathy with new EF 20%, severe AS awaiting TAVR at [place], mitral regurgitation, and thrombosis R peroneal vein on eliquis who presented to the hospital after suffering a fall getting out of his car. His wife thinks the fall was secondary to him not using his assistive device when getting out of the vehicle. He denied hitting his head or LOC. All trauma imaging was negative for acute fractures. He was hypotensive SBP in 60s for EMS. The fall is thought  to be secondary to hypotension. He received 2 units of whole blood and was placed on Levophed for hypotension. Initial CXR showed mild pulmonary venous congestion with trace bilateral pleural effusions.  BNP was > 4700 on admission. He required intubation after becoming hypoxic during initial evaluation by trauma in the ED, likely secondary to acute CHF exacerbation.   Cardiology was consulted for reduction in EF from 30-35% 3/1/25 to 20% this admission on repeat TTE. Patient has remained hypotensive during admission requiring discontinuation of Entresto.     Review of Systems - General ROS: negative for - chills, fatigue, or fever  Psychological ROS: Negative.  Endocrine ROS: negative for - malaise/lethargy, palpitations, or polydipsia/polyuria  Respiratory ROS: no cough, shortness of breath, or wheezing  negative for - cough, orthopnea, or shortness of breath  Cardiovascular ROS: no chest pain or dyspnea on exertion  Gastrointestinal ROS: no abdominal pain, change in bowel habits, or black or bloody stools  positive for - nausea/vomiting  Genito-Urinary ROS: no dysuria, trouble voiding, or hematuria  positive for - oliguria  Musculoskeletal ROS: negative  Neurological ROS: negative for - behavioral changes, confusion, dizziness, or headaches  Dermatological ROS: positive for abrasion on bridge of nose s/p fall      Objective :  HR:  [81-97] 86  BP: ()/(46-66) 104/54  Resp:  [18] 18  SpO2:  [90 %-97 %] 96 %  O2 Device: None (Room air)  Orthostatic Blood Pressures      Flowsheet Row Most Recent Value   Blood Pressure 104/54 filed at 04/23/2025 1000   Patient Position - Orthostatic VS Lying filed at 04/23/2025 0900          First Weight: Weight - Scale: 103 kg (227 lb 1.2 oz) (04/17/25 1340)  Vitals:    04/22/25 0600 04/23/25 0600   Weight: 98.6 kg (217 lb 6 oz) 98.9 kg (218 lb 0.6 oz)     Physical Exam  Vitals reviewed.   Constitutional:       General: He is not in acute distress.     Appearance: Normal  appearance. He is normal weight.   HENT:      Head: Normocephalic and atraumatic.        Comments: Abrasion on bridge of nose s/t fall.  Eyes:      Extraocular Movements: Extraocular movements intact.      Conjunctiva/sclera: Conjunctivae normal.   Neck:      Vascular: No carotid bruit.   Cardiovascular:      Rate and Rhythm: Normal rate and regular rhythm.      Pulses:           Radial pulses are 1+ on the right side and 1+ on the left side.        Posterior tibial pulses are 1+ on the right side and 1+ on the left side.      Heart sounds: Normal heart sounds. No murmur heard.  Pulmonary:      Effort: Pulmonary effort is normal. No respiratory distress.      Breath sounds: Wheezing present.      Comments: Expiratory wheezes  Abdominal:      General: Bowel sounds are normal. There is no distension.      Palpations: Abdomen is soft.      Tenderness: There is no abdominal tenderness.   Musculoskeletal:         General: Normal range of motion.      Cervical back: Normal range of motion and neck supple.      Right lower leg: No edema.      Left lower leg: No edema.   Skin:     General: Skin is warm and dry.      Capillary Refill: Capillary refill takes less than 2 seconds.      Findings: Bruising present.   Neurological:      General: No focal deficit present.      Mental Status: He is alert and oriented to person, place, and time. Mental status is at baseline.   Psychiatric:         Mood and Affect: Mood normal.         Behavior: Behavior normal.         Thought Content: Thought content normal.       Telemetry reviewed: NSR w/ BBB, rate 80's      Lab Results: I have reviewed the following results:CBC/BMP:   .     04/23/25  0459   WBC 6.94   HGB 11.7*   HCT 36.4*      SODIUM 135   K 4.9   CL 95*   CO2 26   BUN 48*   CREATININE 8.09*   GLUC 82   MG 2.2   PHOS 6.5*      Results from last 7 days   Lab Units 04/23/25  0459 04/22/25  0619 04/21/25  0557   WBC Thousand/uL 6.94 6.80 7.26   HEMOGLOBIN g/dL 11.7* 11.6*  10.8*   HEMATOCRIT % 36.4* 37.6 35.5*   PLATELETS Thousands/uL 154 140* 144*     Results from last 7 days   Lab Units 04/23/25  0459 04/22/25  0619 04/21/25  0557 04/18/25  0446 04/17/25  1440   POTASSIUM mmol/L 4.9 4.8 5.4*   < >  --    CHLORIDE mmol/L 95* 96 95*   < >  --    CO2 mmol/L 26 26 24   < >  --    CO2, I-STAT mmol/L  --   --   --   --  21   BUN mg/dL 48* 43* 54*   < >  --    CREATININE mg/dL 8.09* 7.03* 7.44*   < >  --    GLUCOSE, ISTAT mg/dl  --   --   --   --  67   CALCIUM mg/dL 8.4 8.2* 8.0*   < >  --     < > = values in this interval not displayed.     Results from last 7 days   Lab Units 04/18/25  0446 04/17/25  1347   INR  1.35* 1.50*   PTT seconds  --  38*     Lab Results   Component Value Date    HGBA1C 5.4 04/10/2025     Lab Results   Component Value Date    CKTOTAL 188 11/12/2022    CKMB 4.0 11/12/2022    CKMBINDEX 2.1 11/12/2022    TROPONINI <0.02 09/08/2021       Imaging Results Review: I personally reviewed the following image studies/reports in PACS and discussed pertinent findings with Radiology: Echocardiogram.   VTE Pharmacologic Prophylaxis: eliquis  VTE Mechanical Prophylaxis: sequential compression device

## 2025-04-23 NOTE — ASSESSMENT & PLAN NOTE
Recent Labs     04/21/25  0557 04/22/25  0619 04/23/25  0459   HGB 10.8* 11.6* 11.7*     Multifactorial etiology-ESRD, chronic disease  On admission Hb 9.5, s/p 2 unit whole blood for suspected hemorrhagic shock  Folate wnl  B12 384, will start vitamin B12 1000 mcg daily

## 2025-04-23 NOTE — PROGRESS NOTES
Progress Note - Hospitalist   Name: Asad Galloway 65 y.o. male I MRN: [id number]  Unit/Bed#: S -01 I Date of Admission: 4/17/2025   Date of Service: 4/23/2025 I Hospital Day: 6    Assessment & Plan  Fall  Per wife patient was waiting in the car while she attended a doctor's appointment.  Patient exit the car without his walker and subsequently fell.  Denies LOC or preceding symptoms   Found hypotensive  Trauma scans without fracture  CTH negative  CT ab/pel: Small subcutaneous hematoma of right hip without active bleed.  Otherwise no acute traumatic injury.  Cirrhosis with portal hypertension and small volume ascites.     Plan   Fall precautions  PT/OT- rec rehab, patient and family agreeable  Mixed hyperlipidemia  Continue home Lipitor 40   Anemia  Recent Labs     04/21/25  0557 04/22/25  0619 04/23/25  0459   HGB 10.8* 11.6* 11.7*     Multifactorial etiology-ESRD, chronic disease  On admission Hb 9.5, s/p 2 unit whole blood for suspected hemorrhagic shock  Folate wnl  B12 384, will start vitamin B12 1000 mcg daily   ESRD on dialysis ([facility])  Lab Results   Component Value Date    EGFR 6 04/23/2025    EGFR 7 04/22/2025    EGFR 6 04/21/2025    CREATININE 8.09 (H) 04/23/2025    CREATININE 7.03 (H) 04/22/2025    CREATININE 7.44 (H) 04/21/2025     MWF schedule at [facility]  Nephrology consulted appreciate recommendations  CAD, multiple vessel  CAD S/P multiple stents with history over the past years, most recently in 2025 2/27/2025 [facility]: Mid circumflex 10%, first obtuse marginal 100%, first %, severe in-stent restenosis of LAD and  of OM1 ([facility])  3/17/2025 [facility] LM normal, proximal LAD 90%, circumflex luminal irregularities, first obtuse marginal 100%, right system not described, successful balloon angioplasty to proximal LAD with 0% residual stenosis ([facility])  Continue metoprolol 25 mg bid   Discontinued entresto per cardiology  Continue prasugrel and apixaban for AC  Acute  hypoxic respiratory failure (HCC)  Patient was noted to be hypoxic at the CT when laid flat and reportedly hypotensive and thus was intubated in the ED.  Reportedly has been coughing for ~1- 2 weeks per wife  COVID flu RSV negative  WBC normal  procal 0.41  Urine antigens not obtained as patient is anuric  CT chest: No large PE, Cardiomegaly with small right pleural effusion and interstitial edema. Cirrhosis with ascites.  CXR: Mild pulmonary vascular congestion with trace pleural effusions  MRSA neg     Plan  Monitoring off ABX  Volume management with dialysis  Airway clearance protocol   4/23 cough with phlegm- likely irritation from intubation, added on mucinex, tessalon perles, pepcid, CXR pending   Ischemic cardiomyopathy  Echo March 2025 LVEF 35-40%, global hypokinesis with regional variations, moderately reduced RV systolic function, severe left atrial dilation moderate to severely calcified aortic valve with moderate to severe aortic stenosis     -Continue metoprolol 25 mg bid   -Lifevest in patient's room ready for dc   -Cariology recs appreciated   Aortic stenosis  Severe AS, scheduled for TAVR in April 2025 at [facility]   Acute embolism and thrombosis of right peroneal vein (HCC)  Hx of upper extremity DVT in right upper extremity of eliquis 2.5 bid  Eliquis 2.5mg BID  Type 2 diabetes mellitus (HCC)  Lab Results   Component Value Date    HGBA1C 5.4 04/10/2025       Recent Labs     04/22/25  1547 04/22/25  2058 04/23/25  0757 04/23/25  1122   POCGLU 133 112 79 93       Blood Sugar Average: Last 72 hrs:  (P) [id number]    Controlled  Will continue SSI  Hypoglycemia protocol  Chronic kidney disease-mineral and bone disorder  Lab Results   Component Value Date    EGFR 6 04/23/2025    EGFR 7 04/22/2025    EGFR 6 04/21/2025    CREATININE 8.09 (H) 04/23/2025    CREATININE 7.03 (H) 04/22/2025    CREATININE 7.44 (H) 04/21/2025     Calcium and magnesium stable  hx hyperphosphatemia.  Binders recently  discontinued during recent hospitalization at [place].  Continue low phosphorus diet  Secondary hyperparathyroidism of renal origin: On Hectorol 2 mcg 3 times a week with HD and Sensipar 90 mg 3 times a week with HD  Continue to monitor and manage as outpatient  HFrEF (heart failure with reduced ejection fraction) ([facility])  Wt Readings from Last 3 Encounters:   04/23/25 98.9 kg (218 lb 0.6 oz)   04/09/25 100 kg (221 lb)   04/01/25 97.1 kg (214 lb)     Combined systolic and diastolic HF, [facility]  Last echo March 2025- Ef 35-40% with grade III DD, global hypokinesis, severely LA enlargement  Medications;Toprol-XL 25 twice daily, Entresto 24-26 twice daily with Volume management: Dialysis  For this admission volume up with concern for cardiogenic shock     BNP >4700  Trop 130/ 117/107  CXR Mild pulmonary venous congestion with trace pleural effusions.   4/17 echo EF 20%, severe global hypokinesis, ungraded diastolic dysfunction, severe RV dilation moderate AAS, moderate MR moderate TR  Plan   Volume per Dialysis  Resume home medications as able  Cardiology consulted for new reduction in EF, recommendations appreciated  Lifevest assessment completed 4/19, life vest in patient's room   Cirrhosis (HCC)  Known hx cirrhosis. Re-demonstrated on CTA chest/PE 4/17   Hyperkalemia  Recent Labs     04/21/25  0557 04/22/25  0619 04/23/25  0459   K 5.4* 4.8 4.9   MG 2.3 2.1 2.2     Elevated prior to dialysis, resolved  On dialysis   Entresto discontinued per cardiology  Hypotension  Continuously low during admission   Continue midodrine 5 mg tid     VTE Pharmacologic Prophylaxis: VTE Score: 7 High Risk (Score >/= 5) - Pharmacological DVT Prophylaxis Ordered: apixaban (Eliquis). Sequential Compression Devices Ordered.    Mobility:   Basic Mobility Inpatient Raw Score: 10  JH-HLM Goal: 4: Move to chair/commode  JH-HLM Achieved: 1: Laying in bed  JH-HLM Goal NOT achieved. Continue with multidisciplinary rounding and encourage appropriate  mobility to improve upon -[facility] goals.    Patient Centered Rounds: I performed bedside rounds with nursing staff today.   Discussions with Specialists or Other Care Team Provider: nephro, cardio    Education and Discussions with Family / Patient: Updated  (wife) at bedside.    Current Length of Stay: 6 day(s)  Current Patient Status: Inpatient   Certification Statement: The patient will continue to require additional inpatient hospital stay due to monitoring BP in setting of dialysis, new nausea/vom, pending CXR  Discharge Plan: Anticipate discharge in 24-48 hrs to rehab facility.    Code Status: Level 1 - Full Code    Subjective   Seen and examined at bedside this a.m.  Reports nausea and vomiting overnight described as cough with phlegm production. Has been having some difficulty eating foods, less appetite. Denies SOB, CP.     Objective :  HR:  [81-97] 90  BP: ()/(46-66) 100/48  Resp:  [18] 18  SpO2:  [90 %-97 %] 96 %  O2 Device: None (Room air)    Body mass index is 32.2 kg/m².     Input and Output Summary (last 24 hours):     Intake/Output Summary (Last 24 hours) at 4/23/2025 1206  Last data filed at 4/23/2025 0900  Gross per 24 hour   Intake 570 ml   Output 0 ml   Net 570 ml       Physical Exam  Vitals reviewed.   Constitutional:       Appearance: Normal appearance.   HENT:      Head: Normocephalic and atraumatic.      Right Ear: External ear normal.      Left Ear: External ear normal.      Nose:      Comments: Abrasion nasal bridge     Mouth/Throat:      Pharynx: Oropharynx is clear.   Eyes:      Conjunctiva/sclera: Conjunctivae normal.      Pupils: Pupils are equal, round, and reactive to light.   Cardiovascular:      Rate and Rhythm: Normal rate and regular rhythm.      Pulses: Normal pulses.      Heart sounds: Normal heart sounds.   Pulmonary:      Effort: Pulmonary effort is normal.      Breath sounds: Wheezing present.      Comments: RLL wheezing  Abdominal:      General: Abdomen is  flat.      Palpations: Abdomen is soft.      Tenderness: There is no abdominal tenderness.   Musculoskeletal:      Cervical back: Neck supple.      Right lower leg: No edema.      Left lower leg: No edema.   Skin:     General: Skin is warm.      Findings: Bruising present.   Neurological:      Mental Status: He is alert. Mental status is at baseline.   Psychiatric:         Mood and Affect: Mood normal.         Behavior: Behavior normal.         Lines/Drains:              Lab Results: I have reviewed the following results:   Results from last 7 days   Lab Units 04/23/25  0459   WBC Thousand/uL 6.94   HEMOGLOBIN g/dL 11.7*   HEMATOCRIT % 36.4*   PLATELETS Thousands/uL 154   SEGS PCT % 74   LYMPHO PCT % 14   MONO PCT % 8   EOS PCT % 2     Results from last 7 days   Lab Units 04/23/25  0459 04/18/25 2047 04/18/25  0446   SODIUM mmol/L 135   < > 135   POTASSIUM mmol/L 4.9   < > 5.2   CHLORIDE mmol/L 95*   < > 95*   CO2 mmol/L 26   < > 27   BUN mg/dL 48*   < > 33*   CREATININE mg/dL 8.09*   < > 5.30*   ANION GAP mmol/L 14*   < > 13   CALCIUM mg/dL 8.4   < > 7.8*   ALBUMIN g/dL  --   --  3.7   TOTAL BILIRUBIN mg/dL  --   --  0.97   ALK PHOS U/L  --   --  130*   ALT U/L  --   --  9   AST U/L  --   --  13   GLUCOSE RANDOM mg/dL 82   < > 92    < > = values in this interval not displayed.     Results from last 7 days   Lab Units 04/18/25  0446   INR  1.35*     Results from last 7 days   Lab Units 04/23/25  1122 04/23/25  0757 04/22/25  2058 04/22/25  1547 04/22/25  1118 04/21/25  2124 04/21/25  1622 04/21/25  1546 04/21/25  1209 04/21/25  0912 04/21/25  0742 04/20/25  2110   POC GLUCOSE mg/dl 93 79 112 133 180* 109 131 64* 105 113 76 102         Results from last 7 days   Lab Units 04/18/25  0446 04/17/25  1347   LACTIC ACID mmol/L  --  1.4   PROCALCITONIN ng/ml 0.47*  --        Recent Cultures (last 7 days):   Results from last 7 days   Lab Units 04/17/25  2481   BLOOD CULTURE  No Growth After 5 Days.  No Growth After 5  Days.       Imaging Results Review: No pertinent imaging studies reviewed.  Other Study Results Review: No additional pertinent studies reviewed.    Last 24 Hours Medication List:     Current Facility-Administered Medications:     acetaminophen (TYLENOL) tablet 650 mg, Q6H PRN    apixaban (ELIQUIS) tablet 2.5 mg, BID    atorvastatin (LIPITOR) tablet 40 mg, Daily With Dinner    benzonatate (TESSALON PERLES) capsule 100 mg, TID PRN    chlorhexidine (PERIDEX) 0.12 % oral rinse 15 mL, Q12H MICH    Cholecalciferol (VITAMIN D3) tablet 1,000 Units, Daily    cyanocobalamin (VITAMIN B-12) tablet 1,000 mcg, Daily    doxercalciferol (HECTOROL) injection 2 mcg, After Dialysis    famotidine (PEPCID) tablet 20 mg, Daily    [Transfer Hold] guaiFENesin (ROBITUSSIN) oral liquid 200 mg, Q4H PRN    insulin lispro (HumALOG/ADMELOG) 100 units/mL subcutaneous injection 1-6 Units, 4x Daily (PC & HS) **AND** Fingerstick Glucose (POCT), 4x Daily AC and at bedtime    levalbuterol (XOPENEX) inhalation solution 0.63 mg, Q6H PRN    lidocaine (LIDODERM) 5 % patch 1 patch, Daily    [Held by provider] melatonin tablet 3 mg, HS    metoprolol succinate (TOPROL-XL) 24 hr tablet 25 mg, BID    midodrine (PROAMATINE) tablet 10 mg, Before Dialysis    midodrine (PROAMATINE) tablet 5 mg, TID    NON FORMULARY, TID With Meals    prasugrel (EFFIENT) tablet 10 mg, Daily    sevelamer (RENAGEL) oral suspension 2,400 mg, TID With Meals    trimethobenzamide (TIGAN) IM injection 200 mg, Q6H PRN    Administrative Statements   Today, Patient Was Seen By: Aniyah Guerrero DO    **Please Note: This note may have been constructed using a voice recognition system.**

## 2025-04-23 NOTE — ASSESSMENT & PLAN NOTE
[facility] MWF  Access: Left arm AVF.  EDW 96 kg - although wife says it is 95 kg.   Electrolytes are stable overall.  Regular HD today.

## 2025-04-23 NOTE — PLAN OF CARE
Problem: Prexisting or High Potential for Compromised Skin Integrity  Goal: Skin integrity is maintained or improved  Description: INTERVENTIONS:- Identify patients at risk for skin breakdown- Assess and monitor skin integrity- Assess and monitor nutrition and hydration status- Monitor labs - Assess for incontinence - Turn and reposition patient- Assist with mobility/ambulation- Relieve pressure over bony prominences- Avoid friction and shearing- Provide appropriate hygiene as needed including keeping skin clean and dry- Evaluate need for skin moisturizer/barrier cream- Collaborate with interdisciplinary team - Patient/family teaching- Consider wound care consult   Outcome: Progressing     Problem: PAIN - ADULT  Goal: Verbalizes/displays adequate comfort level or baseline comfort level  Description: Interventions:- Encourage patient to monitor pain and request assistance- Assess pain using appropriate pain scale- Administer analgesics based on type and severity of pain and evaluate response- Implement non-pharmacological measures as appropriate and evaluate response- Consider cultural and social influences on pain and pain management- Notify physician/advanced practitioner if interventions unsuccessful or patient reports new pain  Outcome: Progressing     Problem: INFECTION - ADULT  Goal: Absence or prevention of progression during hospitalization  Description: INTERVENTIONS:- Assess and monitor for signs and symptoms of infection- Monitor lab/diagnostic results- Monitor all insertion sites, i.e. indwelling lines, tubes, and drains- Monitor endotracheal if appropriate and nasal secretions for changes in amount and color- [place] appropriate cooling/warming therapies per order- Administer medications as ordered- Instruct and encourage patient and family to use good hand hygiene technique- Identify and instruct in appropriate isolation precautions for identified infection/condition  Outcome: Progressing  Goal:  Absence of fever/infection during neutropenic period  Description: INTERVENTIONS:- Monitor WBC  Outcome: Progressing     Problem: DISCHARGE PLANNING  Goal: Discharge to home or other facility with appropriate resources  Description: INTERVENTIONS:- Identify barriers to discharge w/patient and caregiver- Arrange for needed discharge resources and transportation as appropriate- Identify discharge learning needs (meds, wound care, etc.)- Arrange for interpretive services to assist at discharge as needed- Refer to Case Management Department for coordinating discharge planning if the patient needs post-hospital services based on physician/advanced practitioner order or complex needs related to functional status, cognitive ability, or social support system  Outcome: Progressing     Problem: Knowledge Deficit  Goal: Patient/family/caregiver demonstrates understanding of disease process, treatment plan, medications, and discharge instructions  Description: Complete learning assessment and assess knowledge base.Interventions:- Provide teaching at level of understanding- Provide teaching via preferred learning methods  Outcome: Progressing

## 2025-04-23 NOTE — ASSESSMENT & PLAN NOTE
Lab Results   Component Value Date    EGFR 6 04/23/2025    EGFR 7 04/22/2025    EGFR 6 04/21/2025    CREATININE 8.09 (H) 04/23/2025    CREATININE 7.03 (H) 04/22/2025    CREATININE 7.44 (H) 04/21/2025     MWF schedule at [facility]  Nephrology consulted appreciate recommendations

## 2025-04-24 ENCOUNTER — HOME HEALTH ADMISSION (OUTPATIENT)
Dept: HOME HEALTH SERVICES | Facility: HOME HEALTHCARE | Age: 65
End: 2025-04-24
Payer: MEDICARE

## 2025-04-24 ENCOUNTER — TRANSITIONAL CARE MANAGEMENT (OUTPATIENT)
Dept: FAMILY MEDICINE CLINIC | Facility: CLINIC | Age: 65
End: 2025-04-24

## 2025-04-24 ENCOUNTER — TELEPHONE (OUTPATIENT)
Age: 65
End: 2025-04-24

## 2025-04-24 ENCOUNTER — APPOINTMENT (OUTPATIENT)
Facility: CLINIC | Age: 65
End: 2025-04-24
Payer: MEDICARE

## 2025-04-24 VITALS
HEART RATE: 89 BPM | RESPIRATION RATE: 18 BRPM | HEIGHT: 69 IN | DIASTOLIC BLOOD PRESSURE: 41 MMHG | OXYGEN SATURATION: 96 % | BODY MASS INDEX: 32.2 KG/M2 | WEIGHT: 217.37 LBS | TEMPERATURE: 98.3 F | SYSTOLIC BLOOD PRESSURE: 85 MMHG

## 2025-04-24 LAB
ANION GAP SERPL CALCULATED.3IONS-SCNC: 12 MMOL/L (ref 4–13)
BASOPHILS # BLD AUTO: 0.03 THOUSANDS/ÂΜL (ref 0–0.1)
BASOPHILS NFR BLD AUTO: 0 % (ref 0–1)
BUN SERPL-MCNC: 32 MG/DL (ref 5–25)
CALCIUM SERPL-MCNC: 8.6 MG/DL (ref 8.4–10.2)
CHLORIDE SERPL-SCNC: 99 MMOL/L (ref 96–108)
CO2 SERPL-SCNC: 25 MMOL/L (ref 21–32)
CREAT SERPL-MCNC: 6.45 MG/DL (ref 0.6–1.3)
EOSINOPHIL # BLD AUTO: 0.19 THOUSAND/ÂΜL (ref 0–0.61)
EOSINOPHIL NFR BLD AUTO: 3 % (ref 0–6)
ERYTHROCYTE [DISTWIDTH] IN BLOOD BY AUTOMATED COUNT: 17.4 % (ref 11.6–15.1)
GFR SERPL CREATININE-BSD FRML MDRD: 8 ML/MIN/1.73SQ M
GLUCOSE SERPL-MCNC: 115 MG/DL (ref 65–140)
GLUCOSE SERPL-MCNC: 138 MG/DL (ref 65–140)
GLUCOSE SERPL-MCNC: 93 MG/DL (ref 65–140)
HCT VFR BLD AUTO: 36.6 % (ref 36.5–49.3)
HGB BLD-MCNC: 11.4 G/DL (ref 12–17)
IMM GRANULOCYTES # BLD AUTO: 0.06 THOUSAND/UL (ref 0–0.2)
IMM GRANULOCYTES NFR BLD AUTO: 1 % (ref 0–2)
LYMPHOCYTES # BLD AUTO: 0.81 THOUSANDS/ÂΜL (ref 0.6–4.47)
LYMPHOCYTES NFR BLD AUTO: 12 % (ref 14–44)
MAGNESIUM SERPL-MCNC: 2.2 MG/DL (ref 1.9–2.7)
MCH RBC QN AUTO: 31.2 PG (ref 26.8–34.3)
MCHC RBC AUTO-ENTMCNC: 31.1 G/DL (ref 31.4–37.4)
MCV RBC AUTO: 100 FL (ref 82–98)
MONOCYTES # BLD AUTO: 0.49 THOUSAND/ÂΜL (ref 0.17–1.22)
MONOCYTES NFR BLD AUTO: 7 % (ref 4–12)
NEUTROPHILS # BLD AUTO: 5.1 THOUSANDS/ÂΜL (ref 1.85–7.62)
NEUTS SEG NFR BLD AUTO: 77 % (ref 43–75)
NRBC BLD AUTO-RTO: 0 /100 WBCS
PHOSPHATE SERPL-MCNC: 6.3 MG/DL (ref 2.3–4.1)
PLATELET # BLD AUTO: 131 THOUSANDS/UL (ref 149–390)
PMV BLD AUTO: 10 FL (ref 8.9–12.7)
POTASSIUM SERPL-SCNC: 4.9 MMOL/L (ref 3.5–5.3)
RBC # BLD AUTO: 3.65 MILLION/UL (ref 3.88–5.62)
SODIUM SERPL-SCNC: 136 MMOL/L (ref 135–147)
WBC # BLD AUTO: 6.68 THOUSAND/UL (ref 4.31–10.16)

## 2025-04-24 PROCEDURE — 83735 ASSAY OF MAGNESIUM: CPT

## 2025-04-24 PROCEDURE — 84100 ASSAY OF PHOSPHORUS: CPT

## 2025-04-24 PROCEDURE — 82948 REAGENT STRIP/BLOOD GLUCOSE: CPT

## 2025-04-24 PROCEDURE — 80048 BASIC METABOLIC PNL TOTAL CA: CPT

## 2025-04-24 PROCEDURE — 99232 SBSQ HOSP IP/OBS MODERATE 35: CPT | Performed by: INTERNAL MEDICINE

## 2025-04-24 PROCEDURE — 99238 HOSP IP/OBS DSCHRG MGMT 30/<: CPT | Performed by: FAMILY MEDICINE

## 2025-04-24 PROCEDURE — 85025 COMPLETE CBC W/AUTO DIFF WBC: CPT

## 2025-04-24 RX ORDER — MIDODRINE HYDROCHLORIDE 2.5 MG/1
7.5 TABLET ORAL 3 TIMES DAILY
Qty: 270 TABLET | Refills: 0 | Status: SHIPPED | OUTPATIENT
Start: 2025-04-24 | End: 2025-04-25

## 2025-04-24 RX ORDER — METHYLPREDNISOLONE 4 MG/1
TABLET ORAL
Qty: 21 EACH | Refills: 0 | Status: SHIPPED | OUTPATIENT
Start: 2025-04-24 | End: 2025-04-25

## 2025-04-24 RX ORDER — MIDODRINE HYDROCHLORIDE 10 MG/1
10 TABLET ORAL
Qty: 12 TABLET | Refills: 0 | Status: SHIPPED | OUTPATIENT
Start: 2025-04-24

## 2025-04-24 RX ORDER — ACETAMINOPHEN 160 MG/5ML
500 SUSPENSION ORAL ONCE
Status: DISCONTINUED | OUTPATIENT
Start: 2025-04-24 | End: 2025-04-24 | Stop reason: HOSPADM

## 2025-04-24 RX ADMIN — TRIMETHOBENZAMIDE HYDROCHLORIDE 200 MG: 100 INJECTION INTRAMUSCULAR at 06:12

## 2025-04-24 RX ADMIN — CYANOCOBALAMIN TAB 500 MCG 1000 MCG: 500 TAB at 10:01

## 2025-04-24 RX ADMIN — APIXABAN 2.5 MG: 2.5 TABLET, FILM COATED ORAL at 10:01

## 2025-04-24 RX ADMIN — MIDODRINE HYDROCHLORIDE 5 MG: 5 TABLET ORAL at 10:01

## 2025-04-24 RX ADMIN — FAMOTIDINE 20 MG: 20 TABLET, FILM COATED ORAL at 10:01

## 2025-04-24 RX ADMIN — METOPROLOL SUCCINATE 25 MG: 25 TABLET, EXTENDED RELEASE ORAL at 10:01

## 2025-04-24 RX ADMIN — CHLORHEXIDINE GLUCONATE 15 ML: 1.2 SOLUTION ORAL at 10:02

## 2025-04-24 RX ADMIN — Medication 1000 UNITS: at 10:01

## 2025-04-24 RX ADMIN — SEVELAMER HYDROCHLORIDE 2400 MG: 800 TABLET ORAL at 10:04

## 2025-04-24 RX ADMIN — PRASUGREL 10 MG: 10 TABLET, FILM COATED ORAL at 10:03

## 2025-04-24 NOTE — ASSESSMENT & PLAN NOTE
Defer to primary service.   Patient attempted to walk without his walker which led to a fall.   Low BP probably contributing.   Denied by [facility] for rehab.

## 2025-04-24 NOTE — TELEPHONE ENCOUNTER
Patient wife returned call from office, no notes in chart, clinical unavailable.  Please contact if any issues.

## 2025-04-24 NOTE — PROGRESS NOTES
NEPHROLOGY HOSPITAL PROGRESS NOTE   Asad Galloway 65 y.o. male MRN: [id number]  Unit/Bed#: S -01 Encounter: [id number]  Reason for Consult: ESRD on HD.   Assessment & Plan  ESRD on dialysis ([facility])  [facility]  Access: Left arm AVF.  EDW 96 kg - although wife says it is 95 kg.   Stable electrolytes.   Next HD is tomorrow.     Fall  Defer to primary service.   Patient attempted to walk without his walker which led to a fall.   Low BP probably contributing.   Denied by [facility] for rehab.     HFrEF (heart failure with reduced ejection fraction) ([facility])  Echo 4/17/25 EF 20%, abnormal diastolic function, AS, AI.   Cardiology following.   BP is low limiting UF on HD.   Entresto stopped this admission due to low BP.   Current Rx: Metoprolol succ 25 mg BID.     Hypotension  BP remains intermittently low.   Current Rx: Midodrine 5 mg TID.   Increase Midodrine to 7.5 mg TID.     Anemia  Hgb is at goal.   He is on Mircera in the outpatient setting.     Chronic kidney disease-mineral and bone disorder  OP Rx: He is on Hectorol 2 mcg and Sensipar 90 mg for 2HPT with HD.   OP Rx: He is on Renvela for hyperphos.   Phos is above goal   Continue Sevelamer 2400 mg TID for hyperphos.   Continue Hectorol 2 mcg with HD.   Hold off on Sensipar due to hypocalcemia.     Aortic stenosis  Followed at [place]  Scheduled for TAVR at [place] mid April 2025.     Acute hypoxic respiratory failure (HCC)  Resolved.       TODAY's DISCUSSION / PLAN:  Next HD is tomorrow.   Increase midodrine to 7.5 mg TID.     SUBJECTIVE / 24H INTERVAL HISTORY:  Denies any CP or SOB.   Tolerated HD yesterday.   No new acute events.     OBJECTIVE:  Current Weight: Weight - Scale: 98.6 kg (217 lb 6 oz)  Vitals:    04/23/25 2245 04/24/25 0428 04/24/25 0600 04/24/25 0809   BP: 110/57 99/76  121/60   BP Location:       Pulse: 78 89  87   Resp:    18   Temp:    98.3 °F (36.8 °C)   TempSrc:    Oral   SpO2: 95% 96%  96%   Weight:   98.6 kg (217 lb 6 oz)    Height:            Intake/Output Summary (Last 24 hours) at 4/24/2025 1141  Last data filed at 4/24/2025 0604  Gross per 24 hour   Intake 1101 ml   Output 1471 ml   Net -370 ml     General: conscious, cooperative, no distress  Skin: dry  Eyes: pink conjunctivae  ENT: moist mucous membranes  Respiratory: equal chest expansion, clear breath sounds.  Cardiovascular: distinct heart sounds, normal rate, regular rhythm, no rub  Abdomen: soft, non tender, non distended, normal bowel sounds  Extremities: no edema.   Genitourinary: no chávez catheter.   Neuro: awake, alert.   Psych: anxious    Medications:    Current Facility-Administered Medications:     acetaminophen (TYLENOL) tablet 650 mg, 650 mg, Oral, Q6H PRN, Edward Hanna MD, 650 mg at 04/20/25 0907    albuterol (PROVENTIL HFA,VENTOLIN HFA) inhaler 2 puff, 2 puff, Inhalation, Q4H PRN, Christiano New MD    apixaban (ELIQUIS) tablet 2.5 mg, 2.5 mg, Oral, BID, Jeannie Braswell MD, 2.5 mg at 04/24/25 1001    atorvastatin (LIPITOR) tablet 40 mg, 40 mg, Oral, Daily With Dinner, Jeannie Braswell MD, 40 mg at 04/23/25 1701    benzonatate (TESSALON PERLES) capsule 100 mg, 100 mg, Oral, TID PRN, Aniyah Guerrero, DO    chlorhexidine (PERIDEX) 0.12 % oral rinse 15 mL, 15 mL, Mouth/Throat, Q12H MICH, Jeannie Braswell MD, 15 mL at 04/24/25 1002    Cholecalciferol (VITAMIN D3) tablet 1,000 Units, 1,000 Units, Oral, Daily, Ariel Velásquez MD, 1,000 Units at 04/24/25 1001    cyanocobalamin (VITAMIN B-12) tablet 1,000 mcg, 1,000 mcg, Oral, Daily, Aniyah Guerrero DO, 1,000 mcg at 04/24/25 1001    doxercalciferol (HECTOROL) injection 2 mcg, 2 mcg, Intravenous, After Dialysis, Ariel Velásquez MD, 2 mcg at 04/23/25 1805    famotidine (PEPCID) tablet 20 mg, 20 mg, Oral, Daily, Aniyah Sostorecz, DO, 20 mg at 04/24/25 1001    [Transfer Hold] guaiFENesin (ROBITUSSIN) oral liquid 200 mg, 200 mg, Oral, Q4H PRN, Aniyah Sostorecz, DO, 200 mg at 04/18/25 9882    insulin lispro (HumALOG/ADMELOG) 100  units/mL subcutaneous injection 1-6 Units, 1-6 Units, Subcutaneous, 4x Daily (PC & HS), 1 Units at 04/23/25 2215 **AND** Fingerstick Glucose (POCT), , , 4x Daily AC and at bedtime, Jeannie Braswell MD    lidocaine (LIDODERM) 5 % patch 1 patch, 1 patch, Topical, Daily, Jeannie Braswell MD, 1 patch at 04/23/25 1247    [Held by provider] melatonin tablet 3 mg, 3 mg, Oral, HS, Jeannie Braswell MD    metoprolol succinate (TOPROL-XL) 24 hr tablet 25 mg, 25 mg, Oral, BID, Rayo Nascimento DO, 25 mg at 04/24/25 1001    midodrine (PROAMATINE) tablet 10 mg, 10 mg, Oral, Before Dialysis, Jeannie Braswell MD, 10 mg at 04/23/25 0913    midodrine (PROAMATINE) tablet 5 mg, 5 mg, Oral, TID, Ariel Velásquez MD, 5 mg at 04/24/25 1001    NON FORMULARY, 2,400 mg, Oral, TID With Meals, Aniyah Guerrero DO    prasugrel (EFFIENT) tablet 10 mg, 10 mg, Oral, Daily, Jeannie Braswell MD, 10 mg at 04/24/25 1003    sevelamer (RENAGEL) oral suspension 2,400 mg, 2,400 mg, Oral, TID With Meals, Ariel Velásquez MD, 2,400 mg at 04/24/25 1004    trimethobenzamide (TIGAN) IM injection 200 mg, 200 mg, Intramuscular, Q6H PRN, Edward Hanna MD, 200 mg at 04/24/25 0612    Laboratory Results:  Results from last 7 days   Lab Units 04/24/25  0604 04/23/25  0459 04/22/25  0619 04/21/25  0557 04/20/25  0637 04/19/25  0627 04/18/25  2047 04/18/25  0446 04/17/25  1440 04/17/25  1419 04/17/25  1348   WBC Thousand/uL 6.68 6.94 6.80 7.26 6.67 6.70 6.79   < >  --   --   --    HEMOGLOBIN g/dL 11.4* 11.7* 11.6* 10.8* 11.3* 11.6* 11.3*   < >  --   --   --    I STAT HEMOGLOBIN g/dl  --   --   --   --   --   --   --   --  10.2* 9.9* 10.5*   HEMATOCRIT % 36.6 36.4* 37.6 35.5* 35.7* 37.2 36.0*   < >  --   --   --    HEMATOCRIT, ISTAT %  --   --   --   --   --   --   --   --  30* 29* 31*   PLATELETS Thousands/uL 131* 154 140* 144* 143* 122* 119*   < >  --   --   --    POTASSIUM mmol/L 4.9 4.9 4.8 5.4* 4.9 4.6 4.6   < >  --   --   --    CHLORIDE mmol/L 99 95* 96 95* 95* 96  "96   < >  --   --   --    CO2 mmol/L 25 26 26 24 27 27 28   < >  --   --   --    CO2, I-STAT mmol/L  --   --   --   --   --   --   --   --  21 31 33*   BUN mg/dL 32* 48* 43* 54* 50* 36* 28*   < >  --   --   --    CREATININE mg/dL 6.45* 8.09* 7.03* 7.44* 6.22* 4.92* 4.36*   < >  --   --   --    CALCIUM mg/dL 8.6 8.4 8.2* 8.0* 7.8* 8.0* 8.0*   < >  --   --   --    MAGNESIUM mg/dL 2.2 2.2 2.1 2.3 2.1 2.0 2.0   < >  --   --   --    PHOSPHORUS mg/dL 6.3* 6.5* 5.8* 6.4* 5.9* 5.6* 5.5*   < >  --   --   --    GLUCOSE, ISTAT mg/dl  --   --   --   --   --   --   --   --  67 79 89    < > = values in this interval not displayed.     Portions of the record may have been created with voice recognition software. Occasional wrong word or \"sound a like\" substitutions may have occurred due to the inherent limitations of voice recognition software. Read the chart carefully and recognize, using context, where substitutions have occurred.If you have any questions, please contact the dictating provider.    "

## 2025-04-24 NOTE — PROGRESS NOTES
Patient:  OMAR ANDERSON    MRN:  [id number]    Aidin Request ID:  [id number]    Level of care reserved:  Home Health Agency    Partner Reserved:  [facility], [facility], [place] [address] [phone]    Clinical needs requested:    Geography searched:  [phone]    Start of Service:    Request sent:  11:17am EDT on 4/23/2025 by Marine Zapata    Partner reserved:  10:14am EDT on 4/24/2025 by Marine Zapata    Choice list shared:  9:05am EDT on 4/24/2025 by Marine Zapata

## 2025-04-24 NOTE — ASSESSMENT & PLAN NOTE
BP remains intermittently low.   Current Rx: Midodrine 5 mg TID.   Increase Midodrine to 7.5 mg TID.

## 2025-04-24 NOTE — ASSESSMENT & PLAN NOTE
[facility] MWF  Access: Left arm AVF.  EDW 96 kg - although wife says it is 95 kg.   Stable electrolytes.   Next HD is tomorrow.

## 2025-04-24 NOTE — CASE MANAGEMENT
Case Management Discharge Planning Note    Patient name Asad Galloway  Location S /S -01 MRN [id number]  : 1960 Date 2025       Current Admission Date: 2025  Current Admission Diagnosis:Fall   Patient Active Problem List    Diagnosis Date Noted Date Diagnosed    Hyperkalemia 2025     Hypotension 2025     Cirrhosis ([facility]) 2025     Fluid overload 2025     Chronic kidney disease-mineral and bone disorder 2025     Anemia due to chronic kidney disease, on chronic dialysis ([facility]) 2025     HFrEF (heart failure with reduced ejection fraction) ([facility]) 2025     SIRS (systemic inflammatory response syndrome) ([facility]) 2025     Acute encephalopathy 2025     Peripheral arterial disease ([facility]) 2025     Type 2 diabetes mellitus ([facility]) 2024     Ambulatory dysfunction 2024     Thrombocytopenia ([facility]) 2024     Chronic deep vein thrombosis (DVT) of distal vein of right lower extremity ([facility]) 2024     Bicytopenia 2024     Obesity (BMI 30.0-34.9) 2024     QT prolongation 2024     Acute embolism and thrombosis of right peroneal vein ([facility]) 2024     Right ankle pain 2024     Pre-diabetes 2024     Hypertensive heart and chronic kidney disease with heart failure and with stage 5 chronic kidney disease, or end stage renal disease ([facility]) 2024     PAD (peripheral artery disease) ([facility]) 2024     Chronic systolic heart failure ([facility]) 2024     Edema of left lower extremity 2023     Rectal bleeding 2023     Elevated troponin 2023     Presence of external cardiac defibrillator 2023     Diabetic polyneuropathy associated with type 2 diabetes mellitus ([facility]) 2023     Absence of toe of right foot ([facility]) 2023     Hammer toes of both feet 2023     Ischemic cardiomyopathy 2023     Aortic stenosis 2023     Mood disorder ([facility]) 2023     Old  myocardial infarction 02/02/2023     Hyponatremia 02/01/2023     Acute hypoxic respiratory failure (HCC) 01/29/2023     Fall 01/06/2023     SOB (shortness of breath) 10/21/2022     Left arm swelling 10/21/2022     CAD, multiple vessel 07/14/2022     Electrolyte abnormality 05/02/2022     Chest pain 05/01/2022     Generalized weakness 04/19/2022     Primary hypertension 04/06/2022     Emotional lability 01/19/2022     History of 2019 novel coronavirus disease (COVID-19) 12/26/2020     ESRD on dialysis (HCC) 12/26/2020     Anemia 10/05/2020     S/P arteriovenous (AV) fistula creation 04/27/2020     Pre-kidney transplant, listed 04/27/2020     Urge incontinence 12/20/2019     Anxiety associated with depression 02/28/2019     History of stroke in adulthood 10/04/2018     Abnormal EEG 09/24/2018     Other constipation 08/17/2018     GERD (gastroesophageal reflux disease) 08/13/2018     Elevated alkaline phosphatase level 08/03/2018     Nephrotic range proteinuria 03/28/2018     Dizziness 02/26/2016     Mixed hyperlipidemia 02/26/2016     Antiplatelet or antithrombotic long-term use 02/26/2016       LOS (days): 7  Geometric Mean LOS (GMLOS) (days): 3.5  Days to GMLOS:-3.3     OBJECTIVE:  Risk of Unplanned Readmission Score: 37.46         Current admission status: Inpatient   Preferred Pharmacy:   [facility] #[id number] - [place] - [address]  [address] [place] [address]  Phone: [phone] Fax: [phone]    Primary Care Provider: BRITTANY Allen    Primary Insurance: MEDICARE  Secondary Insurance:     DISCHARGE DETAILS:      Requested Home Health Care         Is the patient interested in HHC at discharge?: Yes  Home Health Discipline requested:: Nursing, Occupational Therapy, Physical Therapy  Home Health Agency Name::  [facility]  Home Health Follow-Up Provider:: PCP  Home Health Services Needed:: Strengthening/Theraputic Exercises to Improve Function, Evaluate Functional Status  and Safety, Gait/ADL Training  Homebound Criteria Met:: Uses an Assist Device (i.e. cane, walker, etc), Requires the Assistance of Another Person for Safe Ambulation or to Leave the Home  Supporting Clincal Findings:: Limited Endurance, Cognitive Deficit Requiring the Assistance of Others         Other Referral/Resources/Interventions Provided:  Referral Comments: TC to Pts son Sam informing him that [facility]. Pts wife agreeable to [facility] referrals. [facility] reserved in [place].

## 2025-04-24 NOTE — PLAN OF CARE
Problem: Prexisting or High Potential for Compromised Skin Integrity  Goal: Skin integrity is maintained or improved  Description: INTERVENTIONS:- Identify patients at risk for skin breakdown- Assess and monitor skin integrity- Assess and monitor nutrition and hydration status- Monitor labs - Assess for incontinence - Turn and reposition patient- Assist with mobility/ambulation- Relieve pressure over bony prominences- Avoid friction and shearing- Provide appropriate hygiene as needed including keeping skin clean and dry- Evaluate need for skin moisturizer/barrier cream- Collaborate with interdisciplinary team - Patient/family teaching- Consider wound care consult   Outcome: Progressing     Problem: PAIN - ADULT  Goal: Verbalizes/displays adequate comfort level or baseline comfort level  Description: Interventions:- Encourage patient to monitor pain and request assistance- Assess pain using appropriate pain scale- Administer analgesics based on type and severity of pain and evaluate response- Implement non-pharmacological measures as appropriate and evaluate response- Consider cultural and social influences on pain and pain management- Notify physician/advanced practitioner if interventions unsuccessful or patient reports new pain  Outcome: Progressing     Problem: INFECTION - ADULT  Goal: Absence or prevention of progression during hospitalization  Description: INTERVENTIONS:- Assess and monitor for signs and symptoms of infection- Monitor lab/diagnostic results- Monitor all insertion sites, i.e. indwelling lines, tubes, and drains- Monitor endotracheal if appropriate and nasal secretions for changes in amount and color- Garner appropriate cooling/warming therapies per order- Administer medications as ordered- Instruct and encourage patient and family to use good hand hygiene technique- Identify and instruct in appropriate isolation precautions for identified infection/condition  Outcome: Progressing  Goal:  Absence of fever/infection during neutropenic period  Description: INTERVENTIONS:- Monitor WBC  Outcome: Progressing     Problem: SAFETY ADULT  Goal: Patient will remain free of falls  Description: INTERVENTIONS:- Educate patient/family on patient safety including physical limitations- Instruct patient to call for assistance with activity - Consult OT/PT to assist with strengthening/mobility - Keep Call bell within reach- Keep bed low and locked with side rails adjusted as appropriate- Keep care items and personal belongings within reach- Initiate and maintain comfort rounds- Make Fall Risk Sign visible to staff- Offer Toileting every 2 Hours, in advance of need- Initiate/Maintain bed alarm- Apply yellow socks and bracelet for high fall risk patients- Consider moving patient to room near nurses station  Outcome: Progressing  Goal: Maintain or return to baseline ADL function  Description: INTERVENTIONS:-  Assess patient's ability to carry out ADLs; assess patient's baseline for ADL function and identify physical deficits which impact ability to perform ADLs (bathing, care of mouth/teeth, toileting, grooming, dressing, etc.)- Assess/evaluate cause of self-care deficits - Assess range of motion- Assess patient's mobility; develop plan if impaired- Assess patient's need for assistive devices and provide as appropriate- Encourage maximum independence but intervene and supervise when necessary- Involve family in performance of ADLs- Assess for home care needs following discharge - Consider OT consult to assist with ADL evaluation and planning for discharge- Provide patient education as appropriate  Outcome: Progressing    Problem: SAFETY ADULT  Goal: Patient will remain free of falls  Description: INTERVENTIONS:- Educate patient/family on patient safety including physical limitations- Instruct patient to call for assistance with activity - Consult OT/PT to assist with strengthening/mobility - Keep Call bell within reach-  Keep bed low and locked with side rails adjusted as appropriate- Keep care items and personal belongings within reach- Initiate and maintain comfort rounds- Make Fall Risk Sign visible to staff- Offer Toileting every 2 Hours, in advance of need- Initiate/Maintain bed alarm- Apply yellow socks and bracelet for high fall risk patients- Consider moving patient to room near nurses station  Outcome: Progressing  Goal: Maintain or return to baseline ADL function  Description: INTERVENTIONS:-  Assess patient's ability to carry out ADLs; assess patient's baseline for ADL function and identify physical deficits which impact ability to perform ADLs (bathing, care of mouth/teeth, toileting, grooming, dressing, etc.)- Assess/evaluate cause of self-care deficits - Assess range of motion- Assess patient's mobility; develop plan if impaired- Assess patient's need for assistive devices and provide as appropriate- Encourage maximum independence but intervene and supervise when necessary- Involve family in performance of ADLs- Assess for home care needs following discharge - Consider OT consult to assist with ADL evaluation and planning for discharge- Provide patient education as appropriate  Outcome: Progressing  Goal: Maintains/Returns to pre admission functional level  Description: INTERVENTIONS:- Perform AM-PAC 6 Click Basic Mobility/ Daily Activity assessment daily.- Set and communicate daily mobility goal to care team and patient/family/caregiver. - Collaborate with rehabilitation services on mobility goals if consulted- Perform Range of Motion . times a day.- Reposition patient every 2 hours.- Dangle patient 2 times a day- Stand patient 3 times a day- Ambulate patient 3 times a day- Out of bed to chair 3 times a day - Out of bed for meals 3 times a day- Out of bed for toileting- Record patient progress and toleration of activity level   Outcome: Progressing     Problem: DISCHARGE PLANNING  Goal: Discharge to home or other  facility with appropriate resources  Description: INTERVENTIONS:- Identify barriers to discharge w/patient and caregiver- Arrange for needed discharge resources and transportation as appropriate- Identify discharge learning needs (meds, wound care, etc.)- Arrange for interpretive services to assist at discharge as needed- Refer to Case Management Department for coordinating discharge planning if the patient needs post-hospital services based on physician/advanced practitioner order or complex needs related to functional status, cognitive ability, or social support system  Outcome: Progressing     Problem: Knowledge Deficit  Goal: Patient/family/caregiver demonstrates understanding of disease process, treatment plan, medications, and discharge instructions  Description: Complete learning assessment and assess knowledge base.Interventions:- Provide teaching at level of understanding- Provide teaching via preferred learning methods  Outcome: Progressing     Problem: METABOLIC, FLUID AND ELECTROLYTES - ADULT  Goal: Electrolytes maintained within normal limits  Description: INTERVENTIONS:- Monitor labs and assess patient for signs and symptoms of electrolyte imbalances- Administer electrolyte replacement as ordered- Monitor response to electrolyte replacements, including repeat lab results as appropriate- Instruct patient on fluid and nutrition as appropriate  Outcome: Progressing  Goal: Fluid balance maintained  Description: INTERVENTIONS:- Monitor labs - Monitor I/O and WT- Instruct patient on fluid and nutrition as appropriate- Assess for signs & symptoms of volume excess or deficit  Outcome: Progressing     Problem: CARDIOVASCULAR - ADULT  Goal: Maintains optimal cardiac output and hemodynamic stability  Description: INTERVENTIONS:- Monitor I/O, vital signs and rhythm- Monitor for S/S and trends of decreased cardiac output- Administer and titrate ordered vasoactive medications to optimize hemodynamic stability-  Assess quality of pulses, skin color and temperature- Assess for signs of decreased coronary artery perfusion- Instruct patient to report change in severity of symptoms  Outcome: Progressing  Goal: Absence of cardiac dysrhythmias or at baseline rhythm  Description: INTERVENTIONS:- Continuous cardiac monitoring, vital signs, obtain 12 lead EKG if ordered- Administer antiarrhythmic and heart rate control medications as ordered- Monitor electrolytes and administer replacement therapy as ordered  Outcome: Progressing     Problem: Nutrition/Hydration-ADULT  Goal: Nutrient/Hydration intake appropriate for improving, restoring or maintaining nutritional needs  Description: Monitor and assess patient's nutrition/hydration status for malnutrition. Collaborate with interdisciplinary team and initiate plan and interventions as ordered.  Monitor patient's weight and dietary intake as ordered or per policy. Utilize nutrition screening tool and intervene as necessary. Determine patient's food preferences and provide high-protein, high-caloric foods as appropriate. INTERVENTIONS:- Monitor oral intake, urinary output, labs, and treatment plans- Assess nutrition and hydration status and recommend course of action- Evaluate amount of meals eaten- Assist patient with eating if necessary - Allow adequate time for meals- Recommend/ encourage appropriate diets, oral nutritional supplements, and vitamin/mineral supplements- Order, calculate, and assess calorie counts as needed- Recommend, monitor, and adjust tube feedings and TPN/PPN based on assessed needs- Assess need for intravenous fluids- Provide specific nutrition/hydration education as appropriate- Include patient/family/caregiver in decisions related to nutrition  Outcome: Progressing      old records/nurses notes/vital signs

## 2025-04-24 NOTE — WOUND OSTOMY CARE
"Progress Note - Wound   Asad Galloway 65 y.o. male MRN: [id number]  Unit/Bed#: S -01 Encounter: [id number]        Assessment:   Patient is seen for wound care follow-up. Patient is awake, alert, and agreeable to today's assessment.     Findings:  B/L heels are dry, intact and dee with no skin loss or wounds present. Recommend preventative Hydraguard Cream and proper offloading/ repositioning.        Right anterior knee abrasion: Partial thickness wound has decreased in size. Beefy red wound bed with scant sanguinous drainage. Periwound clean, dry, and intact. Recommend Xeroform and foam dressing.  Right posterior hand abrasion: HEALED. Brown-red adherent scab. No open wound, no active drainage. Leave MIRNA.  Left posterior arm skin tear: HEALED. No open wound, no active drainage.  Leave MIRNA.  Right great toe, D1, right foot: HEALED. Fully epithelialized. No open area. No active drainage. Leave MIRNA.  Left index finger, D2, left hand: Dry intact eschar.  No open wound, no drainage. Leave MIRNA.    No induration, fluctuance, odor, warmth/temperature differences, redness, or purulence noted to the above noted wounds and skin areas assessed. New dressings applied per orders listed below. Patient tolerated well- no s/s of non-verbal pain or discomfort observed during the encounter. Bedside nurse aware of plan of care. See flow sheets for more detailed assessment findings.      Orders listed below and wound care will continue to follow, call or Secure Chat Message with questions.     Skin Care Plan:  1-Right knee abrasion: Cleanse wounds with NSS and pat dry. Apply Xeroform to wound bed and apply foam dressing. Jaime with \"T\" for treatment and change every other day.   2-Turn/reposition q2h or when medically stable for pressure re-distribution on skin .  3-Elevate heels to offload pressure  4-Moisturize skin daily with skin nourishing cream  5-Ehob cushion in chair when out of bed.  6-Preventative Hydraguard to " buttocks, sacrum, and bilateral heels BID and PRN.      WOUNDS:  Wound 07/15/24 Heel Left (Active)   Wound Image   04/24/25 1037   Wound Description Dry;Intact;Brown 04/24/25 1037   Wound Length (cm) 0 cm 04/24/25 1037   Wound Width (cm) 0 cm 04/24/25 1037   Wound Depth (cm) 0 cm 04/24/25 1037   Wound Surface Area (cm^2) 0 cm^2 04/24/25 1037   Wound Volume (cm^3) 0 cm^3 04/24/25 1037   Calculated Wound Volume (cm^3) 0 cm^3 04/24/25 1037       Wound 03/24/25 Finger D2, index Left;Posterior (Active)   Wound Image   04/24/25 1036   Wound Description Dry;Intact;Eschar 04/24/25 1036   Non-staged Wound Description Not applicable 04/24/25 1036   Wound Length (cm) 0 cm 04/24/25 1036   Wound Width (cm) 0 cm 04/24/25 1036   Wound Surface Area (cm^2) 0 cm^2 04/24/25 1036   Lani-wound Assessment Dry;Intact 04/24/25 1036   Treatments Site care 04/24/25 1036   Wound Site Closure None 04/24/25 1036   Dressing Open to air 04/24/25 1036   Wound packed? No 04/24/25 1036   Dressing Status Other (Comment) 04/24/25 1036       Wound 04/17/25 Knee Anterior;Right (Active)   Wound Image   04/24/25 1033   Wound Description Bleeding;Fragile;Beefy red 04/24/25 1033   Non-staged Wound Description Partial thickness 04/24/25 1033   Wound Length (cm) 1.5 cm 04/24/25 1033   Wound Width (cm) 2.5 cm 04/24/25 1033   Wound Depth (cm) 0.1 cm 04/24/25 1033   Wound Surface Area (cm^2) 3.75 cm^2 04/24/25 1033   Wound Volume (cm^3) 0.375 cm^3 04/24/25 1033   Calculated Wound Volume (cm^3) 0.38 cm^3 04/24/25 1033   Change in Wound Size % 76.97 04/24/25 1033   Drainage Amount Scant 04/24/25 1033   Drainage Description Sanguineous 04/24/25 1033   Lani-wound Assessment Clean;Dry;Intact 04/18/25 0922   Treatments Cleansed;Site care 04/24/25 1033   Wound Site Closure None 04/24/25 1033   Dressing Xeroform;Foam, Silicon (eg. Allevyn, etc) 04/24/25 1033   Wound packed? No 04/24/25 1033   Dressing Changed New 04/24/25 1033   Patient Tolerance Tolerated well 04/24/25  1033   Dressing Status Clean;Dry;Intact 04/24/25 1033           Wound 04/17/25 Hand Posterior;Right (Active)   Wound Image   04/24/25 1032   Wound Description Clean;Dry;Intact 04/24/25 1032   Non-staged Wound Description Not applicable 04/24/25 1032   Wound Length (cm) 0 cm 04/24/25 1032   Wound Width (cm) 0 cm 04/24/25 1032   Wound Depth (cm) 0 cm 04/24/25 1032   Wound Surface Area (cm^2) 0 cm^2 04/24/25 1032   Wound Volume (cm^3) 0 cm^3 04/24/25 1032   Calculated Wound Volume (cm^3) 0 cm^3 04/24/25 1032   Change in Wound Size % 100 04/24/25 1032   Lani-wound Assessment Intact 04/24/25 1032   Wound Site Closure Open to air 04/24/25 1032   Dressing Open to air 04/24/25 1032   Wound packed? No 04/24/25 1032   Dressing Changed Other (Comment) 04/24/25 1032   Dressing Status Other (Comment) 04/24/25 1032       Wound 04/17/25 Toe D1, great Anterior;Right (Active)   Wound Image   04/24/25 1034   Wound Description Lee;Epithelialization 04/24/25 1034   Non-staged Wound Description Not applicable 04/24/25 1034   Wound Length (cm) 0 cm 04/24/25 1034   Wound Width (cm) 0 cm 04/24/25 1034   Wound Depth (cm) 0 cm 04/24/25 1034   Wound Surface Area (cm^2) 0 cm^2 04/24/25 1034   Wound Volume (cm^3) 0 cm^3 04/24/25 1034   Calculated Wound Volume (cm^3) 0 cm^3 04/24/25 1034   Drainage Amount None 04/24/25 1034   Lani-wound Assessment Clean;Dry;Intact 04/24/25 1034   Wound Site Closure None 04/24/25 1034   Dressing Open to air 04/24/25 1034   Wound packed? No 04/24/25 1034   Dressing Status Other (Comment) 04/24/25 1034       Wound 04/17/25 Arm Left;Posterior;Inferior (Active)   Wound Image   04/24/25 1036   Wound Description Clean;Dry;Intact 04/24/25 1036   Non-staged Wound Description Not applicable 04/24/25 1036   Wound Length (cm) 0 cm 04/24/25 1036   Wound Width (cm) 0 cm 04/24/25 1036   Wound Depth (cm) 0 cm 04/24/25 1036   Wound Surface Area (cm^2) 0 cm^2 04/24/25 1036   Wound Volume (cm^3) 0 cm^3 04/24/25 1036   Calculated  Wound Volume (cm^3) 0 cm^3 04/24/25 1036   Change in Wound Size % 100 04/24/25 1036   Drainage Amount None 04/24/25 1036   Drainage Description Other (Comment) 04/24/25 1036   Lani-wound Assessment Clean;Dry;Intact 04/24/25 1036   Wound Site Closure Open to air 04/24/25 1036   Dressing Open to air 04/24/25 1036   Wound packed? No 04/24/25 1036   Dressing Status Other (Comment) 04/24/25 1036       Leigh Nicholson BSN, RN, CWCN

## 2025-04-24 NOTE — ASSESSMENT & PLAN NOTE
Continuously low during admission   Nephrology increased midodrine to 7.5 tid and midrodrine 10 mg before dialysis

## 2025-04-25 ENCOUNTER — HOSPITAL ENCOUNTER (INPATIENT)
Facility: HOSPITAL | Age: 65
LOS: 1 days | Discharge: LEFT BEFORE DISCHARGE | End: 2025-04-25
Attending: EMERGENCY MEDICINE | Admitting: INTERNAL MEDICINE
Payer: MEDICARE

## 2025-04-25 ENCOUNTER — APPOINTMENT (INPATIENT)
Dept: DIALYSIS | Facility: HOSPITAL | Age: 65
End: 2025-04-25
Payer: MEDICARE

## 2025-04-25 VITALS
HEART RATE: 80 BPM | DIASTOLIC BLOOD PRESSURE: 63 MMHG | BODY MASS INDEX: 32.39 KG/M2 | SYSTOLIC BLOOD PRESSURE: 111 MMHG | WEIGHT: 219.36 LBS | RESPIRATION RATE: 18 BRPM | OXYGEN SATURATION: 98 % | TEMPERATURE: 97.5 F

## 2025-04-25 DIAGNOSIS — R53.1 WEAKNESS: ICD-10-CM

## 2025-04-25 DIAGNOSIS — Z99.2 ESRD ON HEMODIALYSIS (HCC): Primary | ICD-10-CM

## 2025-04-25 DIAGNOSIS — N18.6 ESRD ON HEMODIALYSIS (HCC): Primary | ICD-10-CM

## 2025-04-25 DIAGNOSIS — R26.2 AMBULATORY DYSFUNCTION: ICD-10-CM

## 2025-04-25 PROBLEM — Z74.09 IMPAIRED MOBILITY AND ADLS: Status: ACTIVE | Noted: 2025-04-25

## 2025-04-25 PROBLEM — M62.84 SARCOPENIA: Status: ACTIVE | Noted: 2025-04-25

## 2025-04-25 PROBLEM — Z78.9 IMPAIRED MOBILITY AND ADLS: Status: ACTIVE | Noted: 2025-04-25

## 2025-04-25 LAB
ALBUMIN SERPL BCG-MCNC: 4 G/DL (ref 3.5–5)
ALP SERPL-CCNC: 114 U/L (ref 34–104)
ALT SERPL W P-5'-P-CCNC: 10 U/L (ref 7–52)
ANION GAP SERPL CALCULATED.3IONS-SCNC: 13 MMOL/L (ref 4–13)
AST SERPL W P-5'-P-CCNC: 15 U/L (ref 13–39)
BASOPHILS # BLD AUTO: 0.02 THOUSANDS/ÂΜL (ref 0–0.1)
BASOPHILS NFR BLD AUTO: 0 % (ref 0–1)
BILIRUB SERPL-MCNC: 0.96 MG/DL (ref 0.2–1)
BUN SERPL-MCNC: 40 MG/DL (ref 5–25)
CALCIUM SERPL-MCNC: 8.7 MG/DL (ref 8.4–10.2)
CHLORIDE SERPL-SCNC: 96 MMOL/L (ref 96–108)
CO2 SERPL-SCNC: 25 MMOL/L (ref 21–32)
CREAT SERPL-MCNC: 7.3 MG/DL (ref 0.6–1.3)
EOSINOPHIL # BLD AUTO: 0.19 THOUSAND/ÂΜL (ref 0–0.61)
EOSINOPHIL NFR BLD AUTO: 3 % (ref 0–6)
ERYTHROCYTE [DISTWIDTH] IN BLOOD BY AUTOMATED COUNT: 17.2 % (ref 11.6–15.1)
GFR SERPL CREATININE-BSD FRML MDRD: 7 ML/MIN/1.73SQ M
GLUCOSE SERPL-MCNC: 141 MG/DL (ref 65–140)
HCT VFR BLD AUTO: 37.6 % (ref 36.5–49.3)
HGB BLD-MCNC: 11.6 G/DL (ref 12–17)
IMM GRANULOCYTES # BLD AUTO: 0.1 THOUSAND/UL (ref 0–0.2)
IMM GRANULOCYTES NFR BLD AUTO: 2 % (ref 0–2)
LYMPHOCYTES # BLD AUTO: 0.74 THOUSANDS/ÂΜL (ref 0.6–4.47)
LYMPHOCYTES NFR BLD AUTO: 12 % (ref 14–44)
MAGNESIUM SERPL-MCNC: 2.2 MG/DL (ref 1.9–2.7)
MCH RBC QN AUTO: 31.3 PG (ref 26.8–34.3)
MCHC RBC AUTO-ENTMCNC: 30.9 G/DL (ref 31.4–37.4)
MCV RBC AUTO: 101 FL (ref 82–98)
MONOCYTES # BLD AUTO: 0.55 THOUSAND/ÂΜL (ref 0.17–1.22)
MONOCYTES NFR BLD AUTO: 9 % (ref 4–12)
NEUTROPHILS # BLD AUTO: 4.63 THOUSANDS/ÂΜL (ref 1.85–7.62)
NEUTS SEG NFR BLD AUTO: 74 % (ref 43–75)
NRBC BLD AUTO-RTO: 0 /100 WBCS
PHOSPHATE SERPL-MCNC: 6.2 MG/DL (ref 2.3–4.1)
PLATELET # BLD AUTO: 114 THOUSANDS/UL (ref 149–390)
PMV BLD AUTO: 10.4 FL (ref 8.9–12.7)
POTASSIUM SERPL-SCNC: 5.1 MMOL/L (ref 3.5–5.3)
PROT SERPL-MCNC: 6.9 G/DL (ref 6.4–8.4)
RBC # BLD AUTO: 3.71 MILLION/UL (ref 3.88–5.62)
SODIUM SERPL-SCNC: 134 MMOL/L (ref 135–147)
WBC # BLD AUTO: 6.23 THOUSAND/UL (ref 4.31–10.16)

## 2025-04-25 PROCEDURE — NC001 PR NO CHARGE: Performed by: INTERNAL MEDICINE

## 2025-04-25 PROCEDURE — 5A1D70Z PERFORMANCE OF URINARY FILTRATION, INTERMITTENT, LESS THAN 6 HOURS PER DAY: ICD-10-PCS | Performed by: INTERNAL MEDICINE

## 2025-04-25 PROCEDURE — 90935 HEMODIALYSIS ONE EVALUATION: CPT | Performed by: INTERNAL MEDICINE

## 2025-04-25 PROCEDURE — 99285 EMERGENCY DEPT VISIT HI MDM: CPT | Performed by: EMERGENCY MEDICINE

## 2025-04-25 PROCEDURE — 80053 COMPREHEN METABOLIC PANEL: CPT

## 2025-04-25 PROCEDURE — 84100 ASSAY OF PHOSPHORUS: CPT

## 2025-04-25 PROCEDURE — 99223 1ST HOSP IP/OBS HIGH 75: CPT | Performed by: INTERNAL MEDICINE

## 2025-04-25 PROCEDURE — 99222 1ST HOSP IP/OBS MODERATE 55: CPT | Performed by: INTERNAL MEDICINE

## 2025-04-25 PROCEDURE — 36415 COLL VENOUS BLD VENIPUNCTURE: CPT

## 2025-04-25 PROCEDURE — 99223 1ST HOSP IP/OBS HIGH 75: CPT

## 2025-04-25 PROCEDURE — 85025 COMPLETE CBC W/AUTO DIFF WBC: CPT

## 2025-04-25 PROCEDURE — 83735 ASSAY OF MAGNESIUM: CPT

## 2025-04-25 PROCEDURE — 99285 EMERGENCY DEPT VISIT HI MDM: CPT

## 2025-04-25 RX ORDER — PRASUGREL 10 MG/1
10 TABLET, FILM COATED ORAL DAILY
Status: DISCONTINUED | OUTPATIENT
Start: 2025-04-25 | End: 2025-04-26 | Stop reason: HOSPADM

## 2025-04-25 RX ORDER — ASPIRIN 81 MG/1
1 TABLET, CHEWABLE ORAL DAILY
COMMUNITY

## 2025-04-25 RX ORDER — ATORVASTATIN CALCIUM 40 MG/1
40 TABLET, FILM COATED ORAL
Status: DISCONTINUED | OUTPATIENT
Start: 2025-04-25 | End: 2025-04-26 | Stop reason: HOSPADM

## 2025-04-25 RX ORDER — MIDODRINE HYDROCHLORIDE 5 MG/1
10 TABLET ORAL
Status: DISCONTINUED | OUTPATIENT
Start: 2025-04-25 | End: 2025-04-26 | Stop reason: HOSPADM

## 2025-04-25 RX ORDER — MIDODRINE HYDROCHLORIDE 5 MG/1
7.5 TABLET ORAL 3 TIMES DAILY
Status: DISCONTINUED | OUTPATIENT
Start: 2025-04-25 | End: 2025-04-25

## 2025-04-25 RX ORDER — METOPROLOL SUCCINATE 25 MG/1
25 TABLET, EXTENDED RELEASE ORAL 2 TIMES DAILY
Status: DISCONTINUED | OUTPATIENT
Start: 2025-04-25 | End: 2025-04-25

## 2025-04-25 RX ORDER — METOPROLOL SUCCINATE 25 MG/1
25 TABLET, EXTENDED RELEASE ORAL
Status: DISCONTINUED | OUTPATIENT
Start: 2025-04-25 | End: 2025-04-26 | Stop reason: HOSPADM

## 2025-04-25 RX ADMIN — MIDODRINE HYDROCHLORIDE 10 MG: 5 TABLET ORAL at 14:14

## 2025-04-25 NOTE — ASSESSMENT & PLAN NOTE
Blood pressure is stable today but intermittently low during last admission.  Continue midodrine 7.5 mg 3 times daily

## 2025-04-25 NOTE — CASE MANAGEMENT
Case Management Discharge Planning Note    Patient name Asad Galloway  Location ED 24/ED 24 MRN [id number]  : 1960 Date 2025       Current Admission Date: 2025  Current Admission Diagnosis:ESRD on dialysis ([facility])   Patient Active Problem List    Diagnosis Date Noted Date Diagnosed    Sarcopenia 2025     Hyperkalemia 2025     Hypotension 2025     Cirrhosis ([facility]) 2025     Fluid overload 2025     Chronic kidney disease-mineral and bone disorder 2025     Anemia due to chronic kidney disease, on chronic dialysis ([facility]) 2025     HFrEF (heart failure with reduced ejection fraction) ([facility]) 2025     SIRS (systemic inflammatory response syndrome) ([facility]) 2025     Acute encephalopathy 2025     Peripheral arterial disease ([facility]) 2025     Type 2 diabetes mellitus ([facility]) 2024     Ambulatory dysfunction 2024     Thrombocytopenia ([facility]) 2024     Chronic deep vein thrombosis (DVT) of distal vein of right lower extremity ([facility]) 2024     Bicytopenia 2024     Obesity (BMI 30.0-34.9) 2024     QT prolongation 2024     Acute embolism and thrombosis of right peroneal vein ([facility]) 2024     Right ankle pain 2024     Pre-diabetes 2024     Hypertensive heart and chronic kidney disease with heart failure and with stage 5 chronic kidney disease, or end stage renal disease ([facility]) 2024     PAD (peripheral artery disease) ([facility]) 2024     Chronic systolic heart failure ([facility]) 2024     Edema of left lower extremity 2023     Rectal bleeding 2023     Elevated troponin 2023     Presence of external cardiac defibrillator 2023     Diabetic polyneuropathy associated with type 2 diabetes mellitus ([facility]) 2023     Absence of toe of right foot ([facility]) 2023     Hammer toes of both feet 2023     Ischemic cardiomyopathy 2023     Aortic stenosis 2023     Mood  disorder (HCC) 02/02/2023     Old myocardial infarction 02/02/2023     Hyponatremia 02/01/2023     Acute hypoxic respiratory failure (HCC) 01/29/2023     Fall 01/06/2023     SOB (shortness of breath) 10/21/2022     Left arm swelling 10/21/2022     CAD, multiple vessel 07/14/2022     Electrolyte abnormality 05/02/2022     Chest pain 05/01/2022     Generalized weakness 04/19/2022     Primary hypertension 04/06/2022     Emotional lability 01/19/2022     History of 2019 novel coronavirus disease (COVID-19) 12/26/2020     ESRD on dialysis (HCC) 12/26/2020     Anemia 10/05/2020     S/P arteriovenous (AV) fistula creation 04/27/2020     Pre-kidney transplant, listed 04/27/2020     Urge incontinence 12/20/2019     Anxiety associated with depression 02/28/2019     History of stroke in adulthood 10/04/2018     Abnormal EEG 09/24/2018     Other constipation 08/17/2018     GERD (gastroesophageal reflux disease) 08/13/2018     Elevated alkaline phosphatase level 08/03/2018     Nephrotic range proteinuria 03/28/2018     Dizziness 02/26/2016     Mixed hyperlipidemia 02/26/2016     Antiplatelet or antithrombotic long-term use 02/26/2016       LOS (days): 0  Geometric Mean LOS (GMLOS) (days): 3  Days to GMLOS:2.8     OBJECTIVE:  Risk of Unplanned Readmission Score: 37.72         Current admission status: Inpatient   Preferred Pharmacy:   [facility]/pharmacy #[id number] - [place] - [address]  [address] [address]  Phone: [phone] Fax: [phone]    Primary Care Provider: BRITTANY Allen    Primary Insurance: MEDICARE  Secondary Insurance:     DISCHARGE DETALS:    MIKE was asked by [facility] provider to clarify with Jenny Todd ([phone]) if pt will be able to return for dialysis services on Monday.  CM called the [place] location and spoke to Mandy who states that they were unaware pt was being d/c from the hospital with a Life Vest and they are not trained on Life Vests.  Per Mandy,  their doctor accepted pt and the Life Vest will not be an issue in the long run but they cannot accept pt until the staff have been trained on it.  CM asked when the training would be but Mandy did not know.  She states the Clinical Manager, Chandni Pineda will need to arrange that and say when pt can return however Chandni is not going to be back until Monday.     Additionally pt's family would like pt to go to [facility].  Per previous CM notes from [facility], pt was referred and denied.  Family would like another referral after PT/OT/PM&R evaluations.

## 2025-04-25 NOTE — CONSULTS
PHYSICAL MEDICINE AND REHABILITATION CONSULT NOTE  Asad Galloway 65 y.o. male MRN: [id number]  Unit/Bed#: [facility] Encounter: [id number]    Requested by (Physician/Service): John Tan MD  Reason for Consultation:  Assessment of rehabilitation needs      Assessment & Plan  Impaired mobility and ADLs  PT/OT while on acute care.  Therapy evaluations are pending however there continues to be concern the patient will not tolerate acute inpatient rehabilitation. Will follow up functional and medical progress.   ESRD on dialysis (HCC)  Lab Results   Component Value Date    EGFR 7 04/25/2025    EGFR 8 04/24/2025    EGFR 6 04/23/2025    CREATININE 7.30 (H) 04/25/2025    CREATININE 6.45 (H) 04/24/2025    CREATININE 8.09 (H) 04/23/2025   Nephrology following  Outpatient center unable to accommodate life vest until staff is trained   Midodrine per nephrology   Anemia  Chronic   Hemoglobin stable   Ischemic cardiomyopathy  Patient with EF 20-25% severe global hypokinesis   Life Vest   Chronic systolic heart failure (HCC)  Wt Readings from Last 3 Encounters:   04/24/25 98.6 kg (217 lb 6 oz)   04/09/25 100 kg (221 lb)   04/01/25 97.1 kg (214 lb)             Obesity (BMI 30.0-34.9)    Thrombocytopenia (HCC)    Sarcopenia  Multifactorial due to ESRD, CHF, disuse, multiple hospitalizations    Thank you for this consultation.  Do not hesitate to contact service with further questions.      BRITTANY Singleton  PM&R    I have spent a total time of 30 minutes on 04/25/25 in caring for this patient including Patient and family education, Counseling / Coordination of care, Documenting in the medical record, Reviewing/placing orders in the medical record (including tests, medications, and/or procedures), Obtaining or reviewing history  , and Communicating with other healthcare professionals .      History of Present Illness:  Asad Galloway is a 65 y.o. male with a PMH of ESRD on HD, ischemic cardiomyopathy with Lifevest, CHF,  Sarcopenia, anemia, obesity and thrombocytopenia who presented to the [facility] on 4/25/2025 from outpatient dialysis due to not being trained on taking care of a Life Vest. He was recently admitted to [facility] after falling. His hospital course at that time was complicated by hypotension, right hip hematoma, shock, acute hypoxic respiratory failure s/p brief intubation, volume overload and new drop in EF 20-25%. Cardiology was consulted as well as nephrology and he was given albumin and midodrine for hypotension which likely lead to his fall. Cardiology recommended a Life Vest. He was denied [facility] admission and family elected to d/c to home. He was seen in outpatient dialysis and sent to ED as staff was not trained in use of a Life Vest. He will be able to return once staff is trained. PM&R are consulted for rehabilitation recommendations. Therapy is pending.     The patient was seen in dialysis. He is very tearful and states he is scared of dying. His daughter is getting  in July and wants to be present for her wedding. He denies any chronic back, hip or shoulder issues.     Review of Systems: 10 point ROS negative except for what is noted in HPI    Function:  Prior level of function and living situation: The patient lives with his spouse, daughter and son in a bi-level home. There are no steps to enter the home. He reports needing assistance with ADLs at baseline.       Current level of function:  Physical Therapy: Pending   Occupational Therapy: Pending   Speech Therapy: Pending       Physical Exam:  /62 (BP Location: Right arm)   Pulse 55   Temp 98.3 °F (36.8 °C) (Temporal)   Resp 18   SpO2 98%      No intake or output data in the 24 hours ending 04/25/25 1402    There is no height or weight on file to calculate BMI.      Physical Exam  Constitutional:       General: He is not in acute distress.     Appearance: He is not toxic-appearing.      Comments: Chronically ill  appearing    HENT:      Head: Normocephalic and atraumatic.   Eyes:      Extraocular Movements: Extraocular movements intact.   Pulmonary:      Effort: No respiratory distress.   Musculoskeletal:      Comments: ROM WNL with the exception of LUE with fistula in use    Skin:     Findings: Bruising present.   Neurological:      Mental Status: He is alert.      Comments: Oriented to person/place not to year    Psychiatric:         Mood and Affect: Mood is anxious and depressed. Affect is tearful.        Social History:    Social History     Socioeconomic History    Marital status: /Civil Union     Spouse name: None    Number of children: None    Years of education: None    Highest education level: Not asked   Occupational History    Occupation: disabled   Tobacco Use    Smoking status: Never    Smokeless tobacco: Never   Vaping Use    Vaping status: Never Used   Substance and Sexual Activity    Alcohol use: Never    Drug use: No    Sexual activity: Not Currently     Partners: Female     Comment: defer   Other Topics Concern    None   Social History Narrative    Daily caffeine consumption 2-3 servings a day     Social Drivers of Health     Financial Resource Strain: Patient Declined (7/13/2023)    Overall Financial Resource Strain (CARDIA)     Difficulty of Paying Living Expenses: Patient declined   Food Insecurity: No Food Insecurity (4/17/2025)    Nursing - Inadequate Food Risk Classification     Worried About Running Out of Food in the Last Year: Never true     Ran Out of Food in the Last Year: Never true     Ran Out of Food in the Last Year: Never true   Transportation Needs: No Transportation Needs (4/17/2025)    Nursing - Transportation Risk Classification     Lack of Transportation: Not on file     Lack of Transportation: No   Physical Activity: Not on file   Stress: No Stress Concern Present (1/18/2019)    [place] [employer] - Occupational Stress Questionnaire     Feeling of Stress :  Only a little   Social Connections: Unknown (2019)    Social Connection and Isolation Panel [NHANES]     Frequency of Communication with Friends and Family: Not on file     Frequency of Social Gatherings with Friends and Family: Not on file     Attends Zoroastrianism Services: Not on file     Active Member of Clubs or Organizations: Not on file     Attends Club or Organization Meetings: Not on file     Marital Status:    Intimate Partner Violence: Patient Unable To Answer (2025)    Nursing IPS     Feels Physically and Emotionally Safe: Not on file     Physically Hurt by Someone: Not on file     Humiliated or Emotionally Abused by Someone: Not on file     Physically Hurt by Someone: Patient unable to answer     Hurt or Threatened by Someone: Patient unable to answer   Housing Stability: Unknown (2025)    Nursing: Inadequate Housing Risk Classification     Has Housing: Not on file     Worried About Losing Housing: Not on file     Unable to Get Utilities: Not on file     Unable to Pay for Housing in the Last Year: No     Has Housin        Family History:    Family History   Problem Relation Age of Onset    Leukemia Mother     Liver disease Mother     Lung cancer Mother         heavy smoker - 3 ppd    Heart disease Father     Liver disease Father     Diabetes type I Father     Multiple myeloma Sister     Breast cancer Sister     Urolithiasis Family     Alcohol abuse Neg Hx     Depression Neg Hx     Drug abuse Neg Hx     Substance Abuse Neg Hx     Mental illness Neg Hx          Medications:     Current Facility-Administered Medications:     apixaban (ELIQUIS) tablet 2.5 mg, 2.5 mg, Oral, BID, John Tan MD    atorvastatin (LIPITOR) tablet 40 mg, 40 mg, Oral, Daily With Dinner, John Tan MD    Cholecalciferol (VITAMIN D3) tablet 1,000 Units, 1,000 Units, Oral, Daily, John Tan MD    cyanocobalamin (VITAMIN B-12) tablet 1,000 mcg, 1,000 mcg, Oral, Daily, John Tan MD    melatonin  tablet 3 mg, 3 mg, Oral, HS, John Tan MD    metoprolol succinate (TOPROL-XL) 24 hr tablet 25 mg, 25 mg, Oral, HS, John Tan MD    midodrine (PROAMATINE) tablet 10 mg, 10 mg, Oral, Before Dialysis, John Tan MD    prasugrel (EFFIENT) tablet 10 mg, 10 mg, Oral, Daily, John Tan MD    sevelamer (RENAGEL) oral suspension 1,600 mg, 1,600 mg, Oral, TID With Meals, John Tan MD    Current Outpatient Medications:     apixaban (Eliquis) 2.5 mg, Take 1 tablet (2.5 mg total) by mouth 2 (two) times a day, Disp: 60 tablet, Rfl: 3    aspirin 81 mg chewable tablet, Chew 81 mg daily At noon., Disp: , Rfl:     atorvastatin (LIPITOR) 40 mg tablet, Take 1 tablet (40 mg total) by mouth daily with dinner, Disp: 90 tablet, Rfl: 3    metoprolol succinate (TOPROL-XL) 25 mg 24 hr tablet, TAKE 1 TABLET BY MOUTH TWICE A DAY (Patient taking differently: Take 25 mg by mouth daily 1 time daily at night.), Disp: 180 tablet, Rfl: 1    midodrine (PROAMATINE) 10 MG tablet, Take 1 tablet (10 mg total) by mouth Before Dialysis (before dialysis) (Patient taking differently: Take 10 mg by mouth Before Dialysis (before dialysis) As needed, before dialysis), Disp: 12 tablet, Rfl: 0    prasugrel (EFFIENT) tablet, Take 1 tablet (10 mg total) by mouth daily, Disp: 90 tablet, Rfl: 3    sacubitril-valsartan (ENTRESTO) 24-26 MG TABS, Take 1 tablet by mouth daily after dinner Once a day, with dinner., Disp: , Rfl:     Sevelamer Carbonate 2.4 g, 1 packet Three times a day, Disp: , Rfl:     Vitamin D3 1.25 MG (39834 UT) capsule, TAKE 1 CAPSULE BY MOUTH ONE TIME PER WEEK (Patient taking differently: Sunday nights), Disp: 12 capsule, Rfl: 4    cyanocobalamin (VITAMIN B-12) 1000 MCG tablet, Take 1 tablet (1,000 mcg total) by mouth daily (Patient not taking: Reported on 4/25/2025), Disp: 30 tablet, Rfl: 0    melatonin 3 mg, Take 3 mg by mouth (Patient not taking: Reported on 4/25/2025), Disp: , Rfl:     methylPREDNISolone 4 MG tablet therapy  pack, Use as directed on package (Patient not taking: Reported on 4/25/2025), Disp: 21 each, Rfl: 0    midodrine (PROAMATINE) 2.5 mg tablet, Take 3 tablets (7.5 mg total) by mouth 3 (three) times a day (Patient not taking: Reported on 4/25/2025), Disp: 270 tablet, Rfl: 0    Past Medical History:     Past Medical History:   Diagnosis Date    Cerebrovascular accident (CVA) due to thrombosis of left middle cerebral artery ([facility]) 07/29/2018    Chronic kidney disease     Coronary artery disease     Diabetes mellitus ([facility])     not on meds    Dialysis patient ([facility])     M-W-F    Fistula     left upper arm for hemodialyis    GERD (gastroesophageal reflux disease)     History of coronary artery stent placement     x3    Hypercholesteremia     Hyperlipidemia     Hypertension     Infectious viral hepatitis     B as child    Limb alert care status     no BP/IV left arm    Neuropathy     Nonhealing ulcer of heel ([facility]) 04/25/2023    Obesity     Osteomyelitis ([facility])     last assessed 11/4/16-per son not currently    Penetrating foot wound, left, initial encounter 01/19/2022    PVC's (premature ventricular contractions)     sees  Cardio    Stroke ([facility])     last weeof July 2018 [facility]    TIA (transient ischemic attack) 10/28/2018    Ulcer of left heel, limited to breakdown of skin ([facility]) 06/13/2024    Wears dentures     Wears glasses         Past Surgical History:     Past Surgical History:   Procedure Laterality Date    ABDOMINAL SURGERY      CARDIAC CATHETERIZATION N/A 05/02/2022    Procedure: Cardiac Coronary Angiogram;  Surgeon: Sam Davis MD;  [facility];  Service: Cardiology    CARDIAC CATHETERIZATION N/A 05/02/2022    Procedure: Cardiac pci;  Surgeon: Sam Davis MD;  [facility];  Service: Cardiology    CARDIAC CATHETERIZATION  02/01/2023    Procedure: Cardiac catheterization;  Surgeon: Sam Davis MD;  [facility]: [facility];  Service: Cardiology    CARDIAC  "CATHETERIZATION N/A 02/01/2023    Procedure: Cardiac pci;  Surgeon: Sam Davis MD;  Location: [facility];  Service: Cardiology    CARDIAC CATHETERIZATION N/A 02/01/2023    Procedure: Cardiac Coronary Angiogram;  Surgeon: Sam Davis MD;  Location: [facility];  Service: Cardiology    CARDIAC CATHETERIZATION N/A 02/01/2023    Procedure: Cardiac other-IVUS;  Surgeon: Sam Davis MD;  Location: [facility]ARD[facility];  Service: Cardiology    CHOLECYSTECTOMY      Percutaneous    COLONOSCOPY      CORONARY ANGIOPLASTY WITH STENT PLACEMENT      CYSTOSCOPY      HEMODIALYSIS ADULT  1/22/2024    HEMODIALYSIS ADULT  01/24/2024    IR LOWER EXTREMITY ANGIOGRAM  9/29/2023    IR LOWER EXTREMITY ANGIOGRAM  02/26/2024    OTHER SURGICAL HISTORY      \"stimulator to control bowel movements\"    AL ESOPHAGOGASTRODUODENOSCOPY TRANSORAL DIAGNOSTIC N/A 09/27/2016    Procedure: ESOPHAGOGASTRODUODENOSCOPY (EGD);  Surgeon: Adele Rowe MD;  Location: [facility] GI LAB;  Service: Gastroenterology    AL LAPAROSCOPY SURG CHOLECYSTECTOMY N/A 02/29/2016    Procedure: LAPAROSCOPIC CHOLECYSTECTOMY ;  Surgeon: Cliff Roman DO;  Location: [facility];  Service: General    AL SLCTV CATHJ 3RD+ ORD SLCTV ABDL PEL/LXTR BRNCH Left 9/29/2023    Procedure: Left leg angiogram with intervention;  Surgeon: Michelle Galvan MD;  Location: [facility] MA[facility];  Service: Vascular    AL SLCTV CATHJ 3RD+ ORD SLCTV ABDL PEL/LXTR BRNCH Left 02/26/2024    Procedure: Left leg angiogram antegrade access, left popliteal angioplasty;  Surgeon: Michelle Galvan MD;  Location: [facility];  Service: Vascular    ROTATOR CUFF REPAIR Right     SPINAL CORD STIMULATOR IMPLANT      \"Medtronic interstim model # 3058- in lower back to control bowel movements-currently turned off-battery is dead\"    TOE AMPUTATION Right 10/28/2016    Procedure: 3RD TOE AMPUTATION ;  Surgeon: Anjel Salas DPM;  Location: [facility];  Service:          Allergies:     Allergies "   Allergen Reactions    Penicillin G Hives     Other Reaction(s): Unknown/HD has no allergy to PCN    Shellfish-Derived Products - Food Allergy Rash     Other reaction(s): Unknown      Other Reaction(s): denies any allergy           LABORATORY RESULTS:      Lab Results   Component Value Date    HGB 11.6 (L) 04/25/2025    HGB 10.4 (L) 09/08/2020    HCT 37.6 04/25/2025    HCT 31.1 (L) 09/08/2020    WBC 6.23 04/25/2025    WBC 6.1 01/27/2016     Lab Results   Component Value Date    BUN 40 (H) 04/25/2025    BUN 20 01/11/2025     08/10/2016    K 5.1 04/25/2025    K 3.9 01/11/2025    CL 96 04/25/2025    CL 98 (L) 01/11/2025    GLUCOSE 67 04/17/2025    GLUCOSE 189 (H) 01/03/2016    CREATININE 7.30 (H) 04/25/2025    CREATININE 4.64 (H) 01/11/2025     Lab Results   Component Value Date    PROTIME 17.5 (H) 04/18/2025    PROTIME 10.5 08/22/2016    INR 1.35 (H) 04/18/2025    INR 1.2 01/11/2025        DIAGNOSTIC STUDIES: Reviewed  No results found.

## 2025-04-25 NOTE — ED NOTES
Patient brought to ED by dialysis nurse. Pt states he has reservations regarding admission because of need for PT. Dialysis nurse was unaware patient had ready bed and brought patient to ED     Julissa Cat RN  04/25/25 1808       Julissa Cat RN  04/25/25 1809

## 2025-04-25 NOTE — ASSESSMENT & PLAN NOTE
Wt Readings from Last 3 Encounters:   04/24/25 98.6 kg (217 lb 6 oz)   04/09/25 100 kg (221 lb)   04/01/25 97.1 kg (214 lb)

## 2025-04-25 NOTE — ASSESSMENT & PLAN NOTE
Wt Readings from Last 3 Encounters:   04/24/25 98.6 kg (217 lb 6 oz)   04/09/25 100 kg (221 lb)   04/01/25 97.1 kg (214 lb)   HFrEF, last echo EF 20%, abnormal diastolic function, AS, AI.  Follows with cardiology.  Entresto discontinue last admission due to low blood pressure.  Low blood pressure limiting UF on dialysis.  Currently with [facility]

## 2025-04-25 NOTE — CONSULTS
Consultation - Nephrology   Asad Galloway 65 y.o. male MRN: [id number]  Unit/Bed#: ED 24 Encounter: [id number]    ASSESSMENT and PLAN:  Assessment & Plan  ESRD on dialysis (HCC)  Lab Results   Component Value Date    EGFR 7 04/25/2025    EGFR 8 04/24/2025    EGFR 6 04/23/2025    CREATININE 7.30 (H) 04/25/2025    CREATININE 6.45 (H) 04/24/2025    CREATININE 8.09 (H) 04/23/2025   ESRD on HD MWF at [facility].  Access left arm AV fistula.  EDW 96 kg.  Plan for HD today following previous orders.   to arrange for outpatient dialysis  Anemia  Hemoglobin at goal.  On Mircera as an outpatient  Primary hypertension  Blood pressure is stable today but intermittently low during last admission.  Continue midodrine 7.5 mg 3 times daily  Chronic systolic heart failure (HCC)  Wt Readings from Last 3 Encounters:   04/24/25 98.6 kg (217 lb 6 oz)   04/09/25 100 kg (221 lb)   04/01/25 97.1 kg (214 lb)   HFrEF, last echo EF 20%, abnormal diastolic function, AS, AI.  Follows with cardiology.  Entresto discontinue last admission due to low blood pressure.  Low blood pressure limiting UF on dialysis.  Currently with LifeVest  Ischemic cardiomyopathy  Multivessel CAD status post PCI, ischemic cardiomyopathy new reduced EF 20%.  LifeVest in place.  Follows with cardiology  Obesity (BMI 30.0-34.9)    Thrombocytopenia (HCC)  Chronic, platelets mildly low but is stable        SUMMARY OF RECOMMENDATIONS:  For HD today following his outpatient schedule.  Patient discharged from [facility] yesterday, will continue same HD prescriptions 4 hours, EDW 96 kg   to arrange for outpatient dialysis.  Rest of medical management as per primary team  Recommendation was discussed with internal medicine attending Dr. Tan, he agreed with the plan      HISTORY OF PRESENT ILLNESS:  Requesting Physician: John Tan MD  Reason for Consult: ESRD on HD    Asad Galloway is a 65 y.o. male who was admitted to [facility] after presenting from his outpatient dialysis unit, reported was not dialyzed because has a  LifeVest. A renal consultation is requested today for assistance in the management of ESRD on HD.  Patient with ESRD on HD MWF at [facility], also history of ischemic cardiomyopathy, HFrEF, recently discharged from [facility] with a LifeVest yesterday, recently hospitalized after a fall, patient was discharged home after family declining STR, today apparently he went to his outpatient unit and was advised to go to the hospital because he has a LifeVest.  During my evaluation patient lying on ED bed, wife at bedside, denies significant chest pain or shortness of breath, no nausea, no vomiting, reports feeling hungry and wants to eat.  Patient's wife reports they declined STR because she did not like the treatment on that facility before, reports they went today to his outpatient unit [facility] but were told they cannot dialyze him because he has a LifeVest and were advised to go to the hospital.    PAST MEDICAL HISTORY:  Past Medical History:   Diagnosis Date    Cerebrovascular accident (CVA) due to thrombosis of left middle cerebral artery (HCC) 07/29/2018    Chronic kidney disease     Coronary artery disease     Diabetes mellitus (HCC)     not on meds    Dialysis patient ([facility])     M-W-F    Fistula     left upper arm for hemodialyis    GERD (gastroesophageal reflux disease)     History of coronary artery stent placement     x3    Hypercholesteremia     Hyperlipidemia     Hypertension     Infectious viral hepatitis     B as child    Limb alert care status     no BP/IV left arm    Neuropathy     Nonhealing ulcer of heel (HCC) 04/25/2023    Obesity     Osteomyelitis ([facility])     last assessed 11/4/16-per son not currently    Penetrating foot wound, left, initial encounter 01/19/2022    PVC's (premature ventricular contractions)     sees  Cardio    Stroke ([facility])     last weeof July 2018 [facility]  [facility]    TIA (transient ischemic attack) 10/28/2018    Ulcer of left heel, limited to breakdown of skin ([facility], [facility] within [facility]) 06/13/2024    Wears dentures     Wears glasses        PAST SURGICAL HISTORY:  Past Surgical History:   Procedure Laterality Date    ABDOMINAL SURGERY      CARDIAC CATHETERIZATION N/A 05/02/2022    Procedure: Cardiac Coronary Angiogram;  Surgeon: Sam Davis MD;  Location: [facility] CARDIAC CATH LAB;  Service: Cardiology    CARDIAC CATHETERIZATION N/A 05/02/2022    Procedure: Cardiac pci;  Surgeon: Sam Davis MD;  Location: [facility] CARDIAC CATH [facility];  Service: Cardiology    CARDIAC CATHETERIZATION  02/01/2023    Procedure: Cardiac catheterization;  Surgeon: Sam Davis MD;  Location: [facility] CARDIAC CATH LAB;  Service: Cardiology    CARDIAC CATHETERIZATION N/A 02/01/2023    Procedure: Cardiac pci;  Surgeon: Sam Davis MD;  Location: [facility] CARDIAC CATH [facility];  Service: Cardiology    CARDIAC CATHETERIZATION N/A 02/01/2023    Procedure: Cardiac Coronary Angiogram;  Surgeon: Sam Davis MD;  Location: [facility] CARDIAC CATH [facility];  Service: Cardiology    CARDIAC CATHETERIZATION N/A 02/01/2023    Procedure: Cardiac other-IVUS;  Surgeon: Sam Davis MD;  Location: [facility] CARDIAC CATH [facility];  Service: Cardiology    CHOLECYSTECTOMY      Percutaneous    COLONOSCOPY      CORONARY ANGIOPLASTY WITH STENT PLACEMENT      CYSTOSCOPY      HEMODIALYSIS ADULT  1/22/2024    HEMODIALYSIS ADULT  01/24/2024    IR LOWER EXTREMITY ANGIOGRAM  9/29/2023    IR LOWER EXTREMITY ANGIOGRAM  02/26/2024    OTHER SURGICAL HISTORY      \"stimulator to control bowel movements\"    OH ESOPHAGOGASTRODUODENOSCOPY TRANSORAL DIAGNOSTIC N/A 09/27/2016    Procedure: ESOPHAGOGASTRODUODENOSCOPY (EGD);  Surgeon: Adele Rowe MD;  Location: [facility] GI LAB;  Service: Gastroenterology    OH LAPAROSCOPY SURG CHOLECYSTECTOMY N/A 02/29/2016    Procedure: LAPAROSCOPIC CHOLECYSTECTOMY ;  Surgeon: Cliff Roman DO;  Location: [facility] Main OR;  Service: General    OH SLCTV CATHJ 3RD+ ORD " "SLCTV ABDL PEL/LXTR BRNCH Left 9/29/2023    Procedure: Left leg angiogram with intervention;  Surgeon: Michelle Galvan MD;  Location: [facility] MAIN OR;  Service: Vascular    NM SLCTV CATHJ 3RD+ ORD SLCTV ABDL PEL/LXTR BRNCH Left 02/26/2024    Procedure: Left leg angiogram antegrade access, left popliteal angioplasty;  Surgeon: Michelle Galvan MD;  Location: [facility] MAIN OR;  Service: Vascular    ROTATOR CUFF REPAIR Right     SPINAL CORD STIMULATOR IMPLANT      \"Medtronic interstim model # 3058- in lower back to control bowel movements-currently turned off-battery is dead\"    TOE AMPUTATION Right 10/28/2016    Procedure: 3RD TOE AMPUTATION ;  Surgeon: Anjel Salas DPM;  Location: [facility] Main OR;  Service:        SOCIAL HISTORY:  Social History     Substance and Sexual Activity   Alcohol Use Never     Social History     Substance and Sexual Activity   Drug Use No     Social History     Tobacco Use   Smoking Status Never   Smokeless Tobacco Never       FAMILY HISTORY:  Family History   Problem Relation Age of Onset    Leukemia Mother     Liver disease Mother     Lung cancer Mother         heavy smoker - 3 ppd    Heart disease Father     Liver disease Father     Diabetes type I Father     Multiple myeloma Sister     Breast cancer Sister     Urolithiasis Family     Alcohol abuse Neg Hx     Depression Neg Hx     Drug abuse Neg Hx     Substance Abuse Neg Hx     Mental illness Neg Hx        ALLERGIES:  Allergies   Allergen Reactions    Penicillin G Hives     Other Reaction(s): Unknown/HD has no allergy to PCN    Shellfish-Derived Products - Food Allergy Rash     Other reaction(s): Unknown      Other Reaction(s): denies any allergy       MEDICATIONS:    Current Facility-Administered Medications:     apixaban (ELIQUIS) tablet 2.5 mg, 2.5 mg, Oral, BID, John Tan MD    atorvastatin (LIPITOR) tablet 40 mg, 40 mg, Oral, Daily With Dinner, John Tan MD    Cholecalciferol (VITAMIN D3) tablet 1,000 Units, 1,000 Units, " Oral, Daily, John Tan MD    cyanocobalamin (VITAMIN B-12) tablet 1,000 mcg, 1,000 mcg, Oral, Daily, John Tan MD    melatonin tablet 3 mg, 3 mg, Oral, HS, John Tan MD    metoprolol succinate (TOPROL-XL) 24 hr tablet 25 mg, 25 mg, Oral, BID, John Tan MD    midodrine (PROAMATINE) tablet 10 mg, 10 mg, Oral, Before Dialysis, John Tan MD    midodrine (PROAMATINE) tablet 7.5 mg, 7.5 mg, Oral, TID, John Tan MD    prasugrel (EFFIENT) tablet 10 mg, 10 mg, Oral, Daily, John Tan MD    sevelamer (RENAGEL) oral suspension 1,600 mg, 1,600 mg, Oral, TID With Meals, John Tan MD    Current Outpatient Medications:     apixaban (Eliquis) 2.5 mg, Take 1 tablet (2.5 mg total) by mouth 2 (two) times a day, Disp: 60 tablet, Rfl: 3    atorvastatin (LIPITOR) 40 mg tablet, Take 1 tablet (40 mg total) by mouth daily with dinner, Disp: 90 tablet, Rfl: 3    cyanocobalamin (VITAMIN B-12) 1000 MCG tablet, Take 1 tablet (1,000 mcg total) by mouth daily, Disp: 30 tablet, Rfl: 0    melatonin 3 mg, Take 3 mg by mouth, Disp: , Rfl:     methylPREDNISolone 4 MG tablet therapy pack, Use as directed on package, Disp: 21 each, Rfl: 0    metoprolol succinate (TOPROL-XL) 25 mg 24 hr tablet, TAKE 1 TABLET BY MOUTH TWICE A DAY, Disp: 180 tablet, Rfl: 1    midodrine (PROAMATINE) 10 MG tablet, Take 1 tablet (10 mg total) by mouth Before Dialysis (before dialysis), Disp: 12 tablet, Rfl: 0    midodrine (PROAMATINE) 2.5 mg tablet, Take 3 tablets (7.5 mg total) by mouth 3 (three) times a day, Disp: 270 tablet, Rfl: 0    prasugrel (EFFIENT) tablet, Take 1 tablet (10 mg total) by mouth daily, Disp: 90 tablet, Rfl: 3    Sevelamer Carbonate 2.4 g, 1 packet Three times a day, Disp: , Rfl:     Vitamin D3 1.25 MG (21648 UT) capsule, TAKE 1 CAPSULE BY MOUTH ONE TIME PER WEEK, Disp: 12 capsule, Rfl: 4    REVIEW OF SYSTEMS:  All the systems were reviewed and were negative except as documented on the HPI.    PHYSICAL  "EXAM:  Current Weight:    First Weight:    Vitals:    04/25/25 0851   BP: 136/62   BP Location: Right arm   Pulse: 55   Resp: 18   Temp: 98.3 °F (36.8 °C)   TempSrc: Temporal   SpO2: 98%     No intake or output data in the 24 hours ending 04/25/25 1212    General: Chronically ill-appearing, cooperative, in not acute distress  Eyes: conjunctivae pink, anicteric sclerae  ENT: lips and mucous membranes moist  Neck: supple, no JVD  Chest: clear breath sounds bilateral, no crackles, ronchus or wheezings  CVS: distinct S1 & S2, normal rate, regular rhythm  Abdomen: soft, non-tender, non-distended, normoactive bowel sounds  Extremities: no edema of both legs, left upper extremity AV fistula in place  Skin: no rash  Neuro: awake, alert, oriented      Invasive Devices:        Lab Results:   Results from last 7 days   Lab Units 04/25/25  0933 04/24/25  0604 04/23/25  0459   WBC Thousand/uL 6.23 6.68 6.94   HEMOGLOBIN g/dL 11.6* 11.4* 11.7*   HEMATOCRIT % 37.6 36.6 36.4*   PLATELETS Thousands/uL 114* 131* 154   SODIUM mmol/L 134* 136 135   POTASSIUM mmol/L 5.1 4.9 4.9   CHLORIDE mmol/L 96 99 95*   CO2 mmol/L 25 25 26   BUN mg/dL 40* 32* 48*   CREATININE mg/dL 7.30* 6.45* 8.09*   CALCIUM mg/dL 8.7 8.6 8.4   MAGNESIUM mg/dL 2.2 2.2 2.2   PHOSPHORUS mg/dL 6.2* 6.3* 6.5*   ALK PHOS U/L 114*  --   --    ALT U/L 10  --   --    AST U/L 15  --   --              Portions of the record may have been created with voice recognition software. Occasional wrong word or \"sound a like\" substitutions may have occurred due to the inherent limitations of voice recognition software. Read the chart carefully and recognize, using context, where substitutions have occurred.If you have any questions, please contact the dictating provider.  "

## 2025-04-25 NOTE — ASSESSMENT & PLAN NOTE
Recently discharged from [place] with LifeVest.  Presented today to [facility] for dialysis but they would not administer as he had a LifeVest on.  Presented here for HD which she received.  Was not cleared for discharge as we were not able to confirm that [facility] would be able to provide dialysis on Monday.  The patient did not want to stay in the hospital and wait and opted to leave AGAINST MEDICAL ADVICE.

## 2025-04-25 NOTE — CONSULTS
HEMODIALYSIS NOTE:    Patient seen and examined at the inpatient hemodialysis unit at 2:40 PM AV fistula cannulated with good blood flow, blood pressure in the lower side, patient received already midodrine, no significant complaints.  Plan to continue with dialysis following previous order while he was hospitalized in [place].  Discussed with Dr. Tan, [facility] plan to have in-service this weekend before patient can go back to their unit given LifeVest.  Advised for  to follow-up with [facility] to see when patient can go back to his outpatient unit.

## 2025-04-25 NOTE — ED PROVIDER NOTES
Time reflects when diagnosis was documented in both MDM as applicable and the Disposition within this note       Time User Action Codes Description Comment    4/25/2025  9:41 AM Radha Barrios Add [N18.6,  Z99.2] ESRD on hemodialysis (HCC)     4/25/2025  9:41 AM Radha Barrios Add [R53.1] Weakness     4/25/2025  9:41 AM Radha Barrios Add [R26.2] Ambulatory dysfunction           ED Disposition       ED Disposition   Admit    Condition   Stable    Date/Time   Fri Apr 25, 2025  9:41 AM    Comment   Case was discussed with Dr. Tan and the patient's admission status was agreed to be Admission Status: inpatient status to the service of Dr. Tan .               Assessment & Plan       Medical Decision Making  Amount and/or Complexity of Data Reviewed  Labs: ordered.    Risk  Decision regarding hospitalization.      Patient is a 65 y.o. male with PMH of ESRD on dialysis M/W/F, DM, CAD, HF who presents to the ED for evaluation of weakness / not being able to receive dialysis today.    Vital signs stable. On exam bruising to extremities diffusely, chronically ill appearing.    Patients weakness likely due to exacerbation of his chronic medical conditions / deconditioning, would benefit from hospitalization for evaluation for possible placement in rehabilitation facility. Differential diagnosis included but not limited to electrolyte abnormality, anemia.     Plan: CBC, CMP, Mg, Phos    View ED course for further discussion on patient workup.     On review of previous records reviewed hospital notes from patients recent hospitalization.    All labs reviewed and utilized in the medical decision making process  All radiology studies independently viewed by me and interpreted by the radiologist.  I reviewed all testing with the patient.     Upon re-evaluation patient resting comfortably in no acute distress.    Disposition: I discussed the case with SLIM attending.  We reviewed the HPI, pertinent PMH, ED course and  "workup. Agreed with plan and will admit the patient to the hospital for further evaluation and management of weakness, ESRD. Patient in stable condition at this time.       Portions of the record may have been created with voice recognition software. Occasional wrong word or \"sound a like\" substitutions may have occurred due to the inherent limitations of voice recognition software. Read the chart carefully and recognize, using context, where substitutions have occurred.         Medications - No data to display    ED Risk Strat Scores                    (ISAR) Identification of Seniors at Risk  Before the illness or injury that brought you to the Emergency, did you need someone to help you on a regular basis?: 1  In the last 24 hours, have you needed more help than usual?: 1  Have you been hospitalized for one or more nights during the past 6 months?: 1  In general, do you see well?: 0  In general, do you have serious problems with your memory?: 0  Do you take more than three different medications every day?: 1  ISAR Score: 4            SBIRT 22yo+      Flowsheet Row Most Recent Value   Initial Alcohol Screen: US AUDIT-C     1. How often do you have a drink containing alcohol? 0 Filed at: 04/25/2025 0853   2. How many drinks containing alcohol do you have on a typical day you are drinking?  0 Filed at: 04/25/2025 0853   3a. Male UNDER 65: How often do you have five or more drinks on one occasion? 0 Filed at: 04/25/2025 0853   3b. FEMALE Any Age, or MALE 65+: How often do you have 4 or more drinks on one occassion? 0 Filed at: 04/25/2025 0853   Audit-C Score 0 Filed at: 04/25/2025 0853   XIN: How many times in the past year have you...    Used an illegal drug or used a prescription medication for non-medical reasons? Never Filed at: 04/25/2025 0853                            History of Present Illness       Chief Complaint   Patient presents with    Dialysis - Asymptomatic     Pt went to dialysis and they would not " take him because of recent hospitalization and pt wear life vest.  Pt wife was told to bring him here       Past Medical History:   Diagnosis Date    Cerebrovascular accident (CVA) due to thrombosis of left middle cerebral artery ([facility]) 07/29/2018    Chronic kidney disease     Coronary artery disease     Diabetes mellitus (HCC)     not on meds    Dialysis patient ([facility])     M-W-F    Fistula     left upper arm for hemodialyis    GERD (gastroesophageal reflux disease)     History of coronary artery stent placement     x3    Hypercholesteremia     Hyperlipidemia     Hypertension     Infectious viral hepatitis     B as child    Limb alert care status     no BP/IV left arm    Neuropathy     Nonhealing ulcer of heel (HCC) 04/25/2023    Obesity     Osteomyelitis ([facility])     last assessed 11/4/16-per son not currently    Penetrating foot wound, left, initial encounter 01/19/2022    PVC's (premature ventricular contractions)     sees  Cardio    Stroke ([facility])     last weeof July 2018 [facility]    TIA (transient ischemic attack) 10/28/2018    Ulcer of left heel, limited to breakdown of skin ([facility]) 06/13/2024    Wears dentures     Wears glasses       Past Surgical History:   Procedure Laterality Date    ABDOMINAL SURGERY      CARDIAC CATHETERIZATION N/A 05/02/2022    Procedure: Cardiac Coronary Angiogram;  Surgeon: Sam Davis MD;  [facility];  Service: Cardiology    CARDIAC CATHETERIZATION N/A 05/02/2022    Procedure: Cardiac pci;  Surgeon: Sam Davis MD;  [facility];  Service: Cardiology    CARDIAC CATHETERIZATION  02/01/2023    Procedure: Cardiac catheterization;  Surgeon: Sam Davis MD;  [facility];  Service: Cardiology    CARDIAC CATHETERIZATION N/A 02/01/2023    Procedure: Cardiac pci;  Surgeon: Sam Davis MD;  [facility];  Service: Cardiology    CARDIAC CATHETERIZATION N/A 02/01/2023    Procedure: Cardiac Coronary Angiogram;   "Surgeon: Sam Davis MD;  [facility];  Service: Cardiology    CARDIAC CATHETERIZATION N/A 02/01/2023    Procedure: Cardiac other-IVUS;  Surgeon: Sam Davis MD;  [facility];  Service: Cardiology    CHOLECYSTECTOMY      Percutaneous    COLONOSCOPY      CORONARY ANGIOPLASTY WITH STENT PLACEMENT      CYSTOSCOPY      HEMODIALYSIS ADULT  1/22/2024    HEMODIALYSIS ADULT  01/24/2024    IR LOWER EXTREMITY ANGIOGRAM  9/29/2023    IR LOWER EXTREMITY ANGIOGRAM  02/26/2024    OTHER SURGICAL HISTORY      \"stimulator to control bowel movements\"    NY ESOPHAGOGASTRODUODENOSCOPY TRANSORAL DIAGNOSTIC N/A 09/27/2016    Procedure: ESOPHAGOGASTRODUODENOSCOPY (EGD);  Surgeon: Adele Rowe MD;  Location: [facility];  Service: Gastroenterology    NY LAPAROSCOPY SURG CHOLECYSTECTOMY N/A 02/29/2016    Procedure: LAPAROSCOPIC CHOLECYSTECTOMY ;  Surgeon: Cliff Roman DO;  Location: [facility];  Service: General    NY SLCTV CATHJ 3RD+ ORD SLCTV ABDL PEL/LXTR BRNCH Left 9/29/2023    Procedure: Left leg angiogram with intervention;  Surgeon: Michelle Galvan MD;  [facility]: [facility];  Service: Vascular    NY SLCTV CATHJ 3RD+ ORD SLCTV ABDL PEL/LXTR BRNCH Left 02/26/2024    Procedure: Left leg angiogram antegrade access, left popliteal angioplasty;  Surgeon: Michelle Galvan MD;  Location: [facility];  Service: Vascular    ROTATOR CUFF REPAIR Right     SPINAL CORD STIMULATOR IMPLANT      \"Medtronic interstim model # 3058- in lower back to control bowel movements-currently turned off-battery is dead\"    TOE AMPUTATION Right 10/28/2016    Procedure: 3RD TOE AMPUTATION ;  Surgeon: Anjel Salas DPM;  Location[facility];  Service:       Family History   Problem Relation Age of Onset    Leukemia Mother     Liver disease Mother     Lung cancer Mother         heavy smoker - 3 ppd    Heart disease Father     Liver disease Father     Diabetes type I Father     Multiple myeloma Sister     Breast " cancer Sister     Urolithiasis Family     Alcohol abuse Neg Hx     Depression Neg Hx     Drug abuse Neg Hx     Substance Abuse Neg Hx     Mental illness Neg Hx       Social History     Tobacco Use    Smoking status: Never    Smokeless tobacco: Never   Vaping Use    Vaping status: Never Used   Substance Use Topics    Alcohol use: Never    Drug use: No      E-Cigarette/Vaping    E-Cigarette Use Never User       E-Cigarette/Vaping Substances    Nicotine No     THC No     CBD No     Flavoring No     Other No     Unknown No       I have reviewed and agree with the history as documented.     HPI  Patient is a 65 y.o. male with history of ESRD on dialysis, CHF presenting to the emergency department for evaluation for needing dialysis. Per patients wife the patient was recently admitted to the hospital at [place] after falling. He had a life vest placed during that admission. He went to dialysis today but per wife they needed him to be cleared / evaluated before they would perform dialysis. She also states that he is having a very hard time walking / getting around at home and needs placement in rehabilitation. Patient has no other complaints at this time besides continued generalized weakness, denies any falls since being discharged.     Review of Systems   Constitutional:  Negative for fever.   HENT:  Negative for congestion.    Eyes:  Negative for pain.   Respiratory:  Negative for cough.    Cardiovascular:  Negative for chest pain.   Gastrointestinal:  Negative for diarrhea and vomiting.   Genitourinary:  Negative for dysuria.   Musculoskeletal:  Negative for back pain.   Skin:  Negative for rash.   Neurological:  Positive for weakness. Negative for dizziness.   All other systems reviewed and are negative.          Objective       ED Triage Vitals [04/25/25 0851]   Temperature Pulse Blood Pressure Respirations SpO2 Patient Position - Orthostatic VS   98.3 °F (36.8 °C) 55 136/62 18 98 % Sitting      Temp Source Heart  Rate Source BP Location FiO2 (%) Pain Score    Temporal Monitor Right arm -- No Pain      Vitals      Date and Time Temp Pulse SpO2 Resp BP Pain Score FACES Pain Rating User   04/25/25 0851 98.3 °F (36.8 °C) 55 98 % 18 136/62 No Pain -- KRR            Physical Exam  Vitals and nursing note reviewed.   Constitutional:       General: He is not in acute distress.     Appearance: He is not ill-appearing.   HENT:      Head: Normocephalic and atraumatic.      Mouth/Throat:      Mouth: Mucous membranes are moist.   Eyes:      Conjunctiva/sclera: Conjunctivae normal.   Cardiovascular:      Rate and Rhythm: Normal rate.   Pulmonary:      Effort: Pulmonary effort is normal. No respiratory distress.   Abdominal:      General: There is no distension.   Musculoskeletal:         General: Normal range of motion.      Cervical back: Neck supple.   Skin:     General: Skin is warm.      Comments: Bruises to extremities diffusely   Neurological:      General: No focal deficit present.      Mental Status: He is alert.   Psychiatric:         Mood and Affect: Mood normal.         Results Reviewed       Procedure Component Value Units Date/Time    CBC and differential [[id number]]  (Abnormal) Collected: 04/25/25 0933    Lab Status: Final result Specimen: Blood from Arm, Right Updated: 04/25/25 0952     WBC 6.23 Thousand/uL      RBC 3.71 Million/uL      Hemoglobin 11.6 g/dL      Hematocrit 37.6 %       fL      MCH 31.3 pg      MCHC 30.9 g/dL      RDW 17.2 %      MPV 10.4 fL      Platelets 114 Thousands/uL      nRBC 0 /100 WBCs      Segmented % 74 %      Immature Grans % 2 %      Lymphocytes % 12 %      Monocytes % 9 %      Eosinophils Relative 3 %      Basophils Relative 0 %      Absolute Neutrophils 4.63 Thousands/µL      Absolute Immature Grans 0.10 Thousand/uL      Absolute Lymphocytes 0.74 Thousands/µL      Absolute Monocytes 0.55 Thousand/µL      Eosinophils Absolute 0.19 Thousand/µL      Basophils Absolute 0.02 Thousands/µL      Comprehensive metabolic panel [[id number]] Collected: 25    Lab Status: In process Specimen: Blood from Arm, Right Updated: 25    Magnesium [[id number]] Collected: 25    Lab Status: In process Specimen: Blood from Arm, Right Updated: 25    Phosphorus [[id number]] Collected: 25    Lab Status: In process Specimen: Blood from Arm, Right Updated: 25            No orders to display       Procedures    ED Medication and Procedure Management   Prior to Admission Medications   Prescriptions Last Dose Informant Patient Reported? Taking?   Sevelamer Carbonate 2.4 g   Yes No   Si packet Three times a day   Vitamin D3 1.25 MG (77832 UT) capsule  Child No No   Sig: TAKE 1 CAPSULE BY MOUTH ONE TIME PER WEEK   apixaban (Eliquis) 2.5 mg   No No   Sig: Take 1 tablet (2.5 mg total) by mouth 2 (two) times a day   atorvastatin (LIPITOR) 40 mg tablet   No No   Sig: Take 1 tablet (40 mg total) by mouth daily with dinner   cyanocobalamin (VITAMIN B-12) 1000 MCG tablet   No No   Sig: Take 1 tablet (1,000 mcg total) by mouth daily   melatonin 3 mg   Yes No   Sig: Take 3 mg by mouth   methylPREDNISolone 4 MG tablet therapy pack   No No   Sig: Use as directed on package   metoprolol succinate (TOPROL-XL) 25 mg 24 hr tablet   No No   Sig: TAKE 1 TABLET BY MOUTH TWICE A DAY   midodrine (PROAMATINE) 10 MG tablet   No No   Sig: Take 1 tablet (10 mg total) by mouth Before Dialysis (before dialysis)   midodrine (PROAMATINE) 2.5 mg tablet   No No   Sig: Take 3 tablets (7.5 mg total) by mouth 3 (three) times a day   prasugrel (EFFIENT) tablet   No No   Sig: Take 1 tablet (10 mg total) by mouth daily      Facility-Administered Medications: None     Patient's Medications   Discharge Prescriptions    No medications on file     No discharge procedures on file.  ED SEPSIS DOCUMENTATION   Time reflects when diagnosis was documented in both MDM as applicable and the Disposition within this  note       Time User Action Codes Description Comment    4/25/2025  9:41 AM Radha Barrios [N18.6,  Z99.2] ESRD on hemodialysis (HCC)     4/25/2025  9:41 AM Radha Barrios [R53.1] Weakness     4/25/2025  9:41 AM Radha Barrios [R26.2] Ambulatory dysfunction                  Radha Barrios DO  04/25/25 1004

## 2025-04-25 NOTE — ASSESSMENT & PLAN NOTE
Likely multifactorial, ESRD, CHF, disuse, multiple hospitalizations  Add protein supplementation  PT/OT  Discussed discharge planning with the patient's son via telephone, he prefers admission to acute rehab at [facility], if not he would prefer to take him home. Consult CM for possible IP rehab placement

## 2025-04-25 NOTE — HEMODIALYSIS
Post-Dialysis RN Treatment Note    Blood Pressure:  Pre 92/74 mm/Hg  Post 111/63 mmHg   EDW  96 kg    Weight:  Pre 99.5 kg   Post 96 kg   Mode of weight measurement: Standing Scale   Volume Removed  2686 ml    Treatment duration 3.5 hours    NS given  No    Treatment shortened? Yes, describe: Ran out of acid    Medications given during Rx Midodrine 10 mg po   Estimated Kt/V  0.81   Access type: AV fistula   Access Issues: No   Needle Gauge: 15 g   Report called to primary nurse   Yes

## 2025-04-25 NOTE — ASSESSMENT & PLAN NOTE
Wt Readings from Last 3 Encounters:   04/24/25 98.6 kg (217 lb 6 oz)   04/09/25 100 kg (221 lb)   04/01/25 97.1 kg (214 lb)   Ef is 20% per echocardiogram last week  Continue metoprolol  Midorine for BP support  Pt has a life vest  HD for volume managment

## 2025-04-25 NOTE — PLAN OF CARE
Target UF Goal 3 L as tolerated. Patient dialyzing for 4 hours on 2 K bath for serum K of  5.1  per protocol. Treatment plan reviewed with Nephrology.     Problem: METABOLIC, FLUID AND ELECTROLYTES - ADULT  Goal: Electrolytes maintained within normal limits  Description: INTERVENTIONS:- Monitor labs and assess patient for signs and symptoms of electrolyte imbalances- Administer electrolyte replacement as ordered- Monitor response to electrolyte replacements, including repeat lab results as appropriate- Instruct patient on fluid and nutrition as appropriate  Outcome: Progressing  Goal: Fluid balance maintained  Description: INTERVENTIONS:- Monitor labs - Monitor I/O and WT- Instruct patient on fluid and nutrition as appropriate- Assess for signs & symptoms of volume excess or deficit  Outcome: Progressing

## 2025-04-25 NOTE — H&P
H&P - Hospitalist   Name: Asad Galloway 65 y.o. male I MRN: [id number]  Unit/Bed#: ED 24 I Date of Admission: 4/25/2025   Date of Service: 4/25/2025 I Hospital Day: 0     Assessment & Plan  ESRD on dialysis (HCC)  Lab Results   Component Value Date    EGFR 7 04/25/2025    EGFR 8 04/24/2025    EGFR 6 04/23/2025    CREATININE 7.30 (H) 04/25/2025    CREATININE 6.45 (H) 04/24/2025    CREATININE 8.09 (H) 04/23/2025   Continue HD M, W, F while inpatient  Nephrology following  Continue midodrine prior to HD  Consult with CM for an HD unit that can accommodate the life vest  Sarcopenia  Likely multifactorial, ESRD, CHF, disuse, multiple hospitalizations  Add protein supplementation  PT/OT  Discussed discharge planning with the patient's son via telephone, he prefers admission to acute rehab at [facility], if not he would prefer to take him home. Consult CM for possible IP rehab placement  Anemia  Due to ESRD  Hemoglobin is acceptable  Iron supplementation and NOAH per nephrology recomendations  Ischemic cardiomyopathy  Left Ventricle: Left ventricular cavity size is dilated. Wall thickness is normal. There is eccentric hypertrophy. The left ventricular ejection fraction is 20%. Systolic function is severely reduced. There is severe global hypokinesis. Diastolic function is severely abnormal, consistent with ungraded restrictive relaxation.    Right Ventricle: Right ventricular cavity size is severely dilated. Systolic function is moderately to severely reduced.    Right Atrium: The atrium is dilated.    Aortic Valve: The aortic valve is trileaflet. The leaflets are moderately calcified. There is moderately reduced mobility. There is moderate regurgitation. There is moderate stenosis. The aortic valve mean gradient is 17 mmHg. The dimensionless velocity index is 0.32. The aortic valve area is 1.59 cm2.    Mitral Valve: There is moderate annular calcification. There is moderate regurgitation.    Tricuspid Valve: There is moderate  regurgitation. The right ventricular systolic pressure is severely elevated. The estimated right ventricular systolic pressure is 67.00 mmHg.    Aorta: The aortic root is normal in size. The ascending aorta is mildly dilated. The ascending aorta is 4.1 cm.  Monitor on telemetry while inpatient  Pt has a life vest  Chronic systolic heart failure (HCC)  Wt Readings from Last 3 Encounters:   04/24/25 98.6 kg (217 lb 6 oz)   04/09/25 100 kg (221 lb)   04/01/25 97.1 kg (214 lb)   Ef is 20% per echocardiogram last week  Continue metoprolol  Midorine for BP support  Pt has a life vest  HD for volume managment          Obesity (BMI 30.0-34.9)  Weight loss counseling when stable as an outpatient  Thrombocytopenia (HCC)  Chronic and stable      VTE Pharmacologic Prophylaxis:   Moderate Risk (Score 3-4) - Pharmacological DVT Prophylaxis Ordered: apixaban (Eliquis).  Code Status: Level 1 - Full Code   Discussion with family: Updated  (son) via phone.    Anticipated Length of Stay: Patient will be admitted on an inpatient basis with an anticipated length of stay of greater than 2 midnights secondary to discharge planning.    History of Present Illness   Chief Complaint: problem with HD unit    Asad Galloway is a 65 y.o. male with a PMH of ESRD, cardiomyopathy with a life vest who presents with outpatient HD unit problem. He was recently discharged at [facility] to home. He presented today to [facility] for his usual treatment, they could not treat him and referred him to the ED for further management. He was seen by nephrology and is scheduled for HD later today. He denies any complaints.    Review of Systems   All other systems reviewed and are negative.      Historical Information   Past Medical History:   Diagnosis Date    Cerebrovascular accident (CVA) due to thrombosis of left middle cerebral artery (HCC) 07/29/2018    Chronic kidney disease     Coronary artery disease     Diabetes mellitus (HCC)      not on meds    Dialysis patient ([facility])     M-W-F    Fistula     left upper arm for hemodialyis    GERD (gastroesophageal reflux disease)     History of coronary artery stent placement     x3    Hypercholesteremia     Hyperlipidemia     Hypertension     Infectious viral hepatitis     B as child    Limb alert care status     no BP/IV left arm    Neuropathy     Nonhealing ulcer of heel ([facility]) 04/25/2023    Obesity     Osteomyelitis ([facility])     last assessed 11/4/16-per son not currently    Penetrating foot wound, left, initial encounter 01/19/2022    PVC's (premature ventricular contractions)     sees  Cardio    Stroke ([facility])     last weeof July 2018 [facility]    TIA (transient ischemic attack) 10/28/2018    Ulcer of left heel, limited to breakdown of skin ([facility]) 06/13/2024    Wears dentures     Wears glasses      Past Surgical History:   Procedure Laterality Date    ABDOMINAL SURGERY      CARDIAC CATHETERIZATION N/A 05/02/2022    Procedure: Cardiac Coronary Angiogram;  Surgeon: Sam Davis MD;  [facility]: [facility] CARD[facility]AC [facility];  Service: Cardiology    CARDIAC CATHETERIZATION N/A 05/02/2022    Procedure: Cardiac pci;  Surgeon: Sam Davis MD;  [facility]: [facility] CARDIAC [facility];  Service: Cardiology    CARDIAC CATHETERIZATION  02/01/2023    Procedure: Cardiac catheterization;  Surgeon: Sam Davis MD;  Location: [facility] CARDIAC C[facility];  Service: Cardiology    CARDIAC CATHETERIZATION N/A 02/01/2023    Procedure: Cardiac pci;  Surgeon: Sam Davis MD;  [facility]: [facility] CARD[facility]AC [facility];  Service: Cardiology    CARDIAC CATHETERIZATION N/A 02/01/2023    Procedure: Cardiac Coronary Angiogram;  Surgeon: Sam Davis MD;  Location: [facility] CARD[facility]AC [facility];  Service: Cardiology    CARDIAC CATHETERIZATION N/A 02/01/2023    Procedure: Cardiac other-IVUS;  Surgeon: Sam Davis MD;  Location: [facility] CARDIAC CATH [facility];  Service: Cardiology    CHOLECYSTECTOMY      Percutaneous    COLONOSCOPY      CORONARY ANGIOPLASTY WITH STENT  "PLACEMENT      CYSTOSCOPY      HEMODIALYSIS ADULT  1/22/2024    HEMODIALYSIS ADULT  01/24/2024    IR LOWER EXTREMITY ANGIOGRAM  9/29/2023    IR LOWER EXTREMITY ANGIOGRAM  02/26/2024    OTHER SURGICAL HISTORY      \"stimulator to control bowel movements\"    RI ESOPHAGOGASTRODUODENOSCOPY TRANSORAL DIAGNOSTIC N/A 09/27/2016    Procedure: ESOPHAGOGASTRODUODENOSCOPY (EGD);  Surgeon: Adele Rowe MD;  Location: [facility] GI LAB;  Service: Gastroenterology    RI LAPAROSCOPY SURG CHOLECYSTECTOMY N/A 02/29/2016    Procedure: LAPAROSCOPIC CHOLECYSTECTOMY ;  Surgeon: Cliff Roman DO;  Location: [facility] Main OR;  Service: General    RI SLCTV CATHJ 3RD+ ORD SLCTV ABDL PEL/LXTR BRNCH Left 9/29/2023    Procedure: Left leg angiogram with intervention;  Surgeon: Michelle Galvan MD;  Location: [facility] MAIN OR;  Service: Vascular    RI SLCTV CATHJ 3RD+ ORD SLCTV ABDL PEL/LXTR BRNCH Left 02/26/2024    Procedure: Left leg angiogram antegrade access, left popliteal angioplasty;  Surgeon: Michelle Galvan MD;  Location: [facility] MAIN OR;  Service: Vascular    ROTATOR CUFF REPAIR Right     SPINAL CORD STIMULATOR IMPLANT      \"Medtronic interstim model # 3058- in lower back to control bowel movements-currently turned off-battery is dead\"    TOE AMPUTATION Right 10/28/2016    Procedure: 3RD TOE AMPUTATION ;  Surgeon: Anjel Salas DPM;  Location: [facility] Main OR;  Service:      Social History     Tobacco Use    Smoking status: Never    Smokeless tobacco: Never   Vaping Use    Vaping status: Never Used   Substance and Sexual Activity    Alcohol use: Never    Drug use: No    Sexual activity: Not Currently     Partners: Female     Comment: defer     E-Cigarette/Vaping    E-Cigarette Use Never User      E-Cigarette/Vaping Substances    Nicotine No     THC No     CBD No     Flavoring No     Other No     Unknown No      Family history non-contributory  Social History:  Marital Status: /Civil Union   Occupation: not employed  Patient Pre-hospital " Living Situation: Home  Patient Pre-hospital Level of Mobility: walks with walker  Patient Pre-hospital Diet Restrictions: renal    Meds/Allergies   I have reviewed home medications using recent Epic encounter.  Prior to Admission medications    Medication Sig Start Date End Date Taking? Authorizing Provider   apixaban (Eliquis) 2.5 mg Take 1 tablet (2.5 mg total) by mouth 2 (two) times a day 4/8/25   Dayanara Holbrook PA-C   atorvastatin (LIPITOR) 40 mg tablet Take 1 tablet (40 mg total) by mouth daily with dinner 3/20/25   Britney Tan MD   cyanocobalamin (VITAMIN B-12) 1000 MCG tablet Take 1 tablet (1,000 mcg total) by mouth daily 4/25/25 5/25/25  Aniyah Guerrero DO   melatonin 3 mg Take 3 mg by mouth 3/14/25   Historical Provider, MD   methylPREDNISolone 4 MG tablet therapy pack Use as directed on package 4/24/25   Aniyah Guerrero DO   metoprolol succinate (TOPROL-XL) 25 mg 24 hr tablet TAKE 1 TABLET BY MOUTH TWICE A DAY 3/10/25   Britney Tan MD   midodrine (PROAMATINE) 10 MG tablet Take 1 tablet (10 mg total) by mouth Before Dialysis (before dialysis) 4/24/25   Aniyah Guerrero DO   midodrine (PROAMATINE) 2.5 mg tablet Take 3 tablets (7.5 mg total) by mouth 3 (three) times a day 4/24/25 5/24/25  Aniyah Guerrero DO   prasugrel (EFFIENT) tablet Take 1 tablet (10 mg total) by mouth daily 3/20/25   Britney Tan MD   Sevelamer Carbonate 2.4 g 1 packet Three times a day 1/14/25 1/13/26  Historical Provider, MD   Vitamin D3 1.25 MG (02783 UT) capsule TAKE 1 CAPSULE BY MOUTH ONE TIME PER WEEK 4/17/24   Mary Torres MD     Allergies   Allergen Reactions    Penicillin G Hives     Other Reaction(s): Unknown/HD has no allergy to PCN    Shellfish-Derived Products - Food Allergy Rash     Other reaction(s): Unknown      Other Reaction(s): denies any allergy       Objective :  Temp:  [98.3 °F (36.8 °C)] 98.3 °F (36.8 °C)  HR:  [55] 55  BP: (136)/(62) 136/62  Resp:  [18] 18  SpO2:  [98  %] 98 %  O2 Device: None (Room air)    Physical Exam  Constitutional:       Appearance: Normal appearance.   HENT:      Head: Normocephalic and atraumatic.      Nose: Nose normal.   Eyes:      Extraocular Movements: Extraocular movements intact.   Cardiovascular:      Rate and Rhythm: Normal rate and regular rhythm.   Pulmonary:      Effort: Pulmonary effort is normal.      Breath sounds: No wheezing or rales.   Musculoskeletal:      Right lower leg: No edema.      Left lower leg: No edema.   Skin:     General: Skin is warm and dry.   Neurological:      Mental Status: He is alert and oriented to person, place, and time.   Psychiatric:         Mood and Affect: Mood normal.         Behavior: Behavior normal.          Lines/Drains:            Lab Results: I have reviewed the following results:  Results from last 7 days   Lab Units 04/25/25  0933   WBC Thousand/uL 6.23   HEMOGLOBIN g/dL 11.6*   HEMATOCRIT % 37.6   PLATELETS Thousands/uL 114*   SEGS PCT % 74   LYMPHO PCT % 12*   MONO PCT % 9   EOS PCT % 3     Results from last 7 days   Lab Units 04/25/25  0933   SODIUM mmol/L 134*   POTASSIUM mmol/L 5.1   CHLORIDE mmol/L 96   CO2 mmol/L 25   BUN mg/dL 40*   CREATININE mg/dL 7.30*   ANION GAP mmol/L 13   CALCIUM mg/dL 8.7   ALBUMIN g/dL 4.0   TOTAL BILIRUBIN mg/dL 0.96   ALK PHOS U/L 114*   ALT U/L 10   AST U/L 15   GLUCOSE RANDOM mg/dL 141*         Results from last 7 days   Lab Units 04/24/25  1140 04/24/25  0816 04/23/25  2126 04/23/25  1617 04/23/25  1122 04/23/25  0757 04/22/25  2058 04/22/25  1547 04/22/25  1118 04/21/25  2124 04/21/25  1622 04/21/25  1546   POC GLUCOSE mg/dl 138 115 186* 128 93 79 112 133 180* 109 131 64*     Lab Results   Component Value Date    HGBA1C 5.4 04/10/2025    HGBA1C 5.5 11/07/2024    HGBA1C 5.9 (H) 07/14/2024           Imaging Results Review: No pertinent imaging studies reviewed.  Other Study Results Review: No additional pertinent studies reviewed.    Administrative Statements   I  have spent a total time of 40 minutes in caring for this patient on the day of the visit/encounter including Diagnostic results, Instructions for management, Patient and family education, Impressions, Counseling / Coordination of care, Documenting in the medical record, Reviewing/placing orders in the medical record (including tests, medications, and/or procedures), Obtaining or reviewing history  , and Communicating with other healthcare professionals .    ** Please Note: This note has been constructed using a voice recognition system. **

## 2025-04-25 NOTE — Clinical Note
Eliana Galloway accompanied Asad Galloway to the emergency department on 4/25/2025.    Return date if applicable: 04/28/2025        If you have any questions or concerns, please don't hesitate to call.      Radha Barrios, DO

## 2025-04-25 NOTE — Clinical Note
Case was discussed with Dr. Tan and the patient's admission status was agreed to be Admission Status: inpatient status to the service of Dr. Tan .

## 2025-04-25 NOTE — ASSESSMENT & PLAN NOTE
Due to ESRD  Hemoglobin is acceptable  Iron supplementation and NOAH per nephrology recomendations

## 2025-04-25 NOTE — ASSESSMENT & PLAN NOTE
Lab Results   Component Value Date    EGFR 7 04/25/2025    EGFR 8 04/24/2025    EGFR 6 04/23/2025    CREATININE 7.30 (H) 04/25/2025    CREATININE 6.45 (H) 04/24/2025    CREATININE 8.09 (H) 04/23/2025   ESRD on HD MWF at [facility].  Access left arm AV fistula.  EDW 96 kg.  Plan for HD today following previous orders.   to arrange for outpatient dialysis

## 2025-04-25 NOTE — Clinical Note
Asad Galloway was seen and treated in our emergency department on 4/25/2025.                Diagnosis:     Asad  .    He may return on this date:          If you have any questions or concerns, please don't hesitate to call.      Byron Merino, DO    ______________________________           _______________          _______________  Hospital Representative                              Date                                Time

## 2025-04-25 NOTE — DISCHARGE SUMMARY
Discharge Summary - Hospitalist   Name: Asad Galloway 65 y.o. male I MRN: [id number]  Unit/Bed#: QCB I Date of Admission: 4/25/2025   Date of Service: 4/25/2025 I Hospital Day: 0         Patient left the hospital AGAINST MEDICAL ADVICE    Assessment & Plan  ESRD on dialysis (HCC)  Recently discharged from [place] with LifeVest.  Presented today to [facility] for dialysis but they would not administer as he had a LifeVest on.  Presented here for HD which she received.  Was not cleared for discharge as we were not able to confirm that [facility] would be able to provide dialysis on Monday.  The patient did not want to stay in the hospital and wait and opted to leave AGAINST MEDICAL ADVICE.  Ischemic cardiomyopathy  Left Ventricle: Left ventricular cavity size is dilated. Wall thickness is normal. There is eccentric hypertrophy. The left ventricular ejection fraction is 20%. Systolic function is severely reduced. There is severe global hypokinesis. Diastolic function is severely abnormal, consistent with ungraded restrictive relaxation.    Right Ventricle: Right ventricular cavity size is severely dilated. Systolic function is moderately to severely reduced.    Right Atrium: The atrium is dilated.    Aortic Valve: The aortic valve is trileaflet. The leaflets are moderately calcified. There is moderately reduced mobility. There is moderate regurgitation. There is moderate stenosis. The aortic valve mean gradient is 17 mmHg. The dimensionless velocity index is 0.32. The aortic valve area is 1.59 cm2.    Mitral Valve: There is moderate annular calcification. There is moderate regurgitation.    Tricuspid Valve: There is moderate regurgitation. The right ventricular systolic pressure is severely elevated. The estimated right ventricular systolic pressure is 67.00 mmHg.    Aorta: The aortic root is normal in size. The ascending aorta is mildly dilated. The ascending aorta is 4.1 cm.  Monitor on telemetry while inpatient  Pt  has a life vest  Sarcopenia  Likely multifactorial, ESRD, CHF, disuse, multiple hospitalizations  Add protein supplementation  PT/OT  Discussed discharge planning with the patient's son via telephone, he prefers admission to acute rehab at [facility], if not he would prefer to take him home. Consult CM for possible IP rehab placement  Anemia  Due to ESRD  Hemoglobin is acceptable  Iron supplementation and NOAH per nephrology recomendations  Chronic systolic heart failure (HCC)  Wt Readings from Last 3 Encounters:   04/25/25 99.5 kg (219 lb 5.7 oz)   04/24/25 98.6 kg (217 lb 6 oz)   04/09/25 100 kg (221 lb)   Ef is 20% per echocardiogram last week  Continue metoprolol  Midorine for BP support  Pt has a life vest  HD for volume managment  Obesity (BMI 30.0-34.9)  Weight loss counseling when stable as an outpatient  Thrombocytopenia (HCC)  Chronic and stable  Impaired mobility and ADLs       Medical Problems       Resolved Problems  Date Reviewed: 4/9/2025   None       Discharging Physician / Practitioner: Isidro Day DO  PCP: BRITTANY Allen  Admission Date:   Admission Orders (From admission, onward)       Ordered        04/25/25 0941  INPATIENT ADMISSION  Once                          Discharge Date: 04/25/25    Consultations During Hospital Stay:  Nephrology    Procedures Performed:   hemodialysis    Significant Findings / Test Results:   none    Incidental Findings:   none     Test Results Pending at Discharge (will require follow up):   none     Outpatient Tests Requested:  none    Complications:  none    Reason for Admission: HD    Hospital Course:   Asad Galloway is a 65 y.o. male patient who originally presented to the hospital on 4/25/2025 due to missing dialysis.  The patient was just hospitalized at [facility] and discharged home yesterday.  However as part of the hospitalization he was given a LifeVest.  When he arrived at [facility] for hemodialysis earlier today they would  not administer it as he was a new LifeVest patient.  They referred him to the ER for hemodialysis.  He was admitted to the hospitalist service with nephrology consultation.  He underwent hemodialysis without complication.  However the patient did not want to stay in the hospital any further.  We cannot clear him patient was not cleared medically as we could not confirm that he would be able to get dialysis again on Monday at his typical outpatient [facility] center.  The patient the risks of leaving the hospital and missing dialysis again were discussed with the patient and his wife at length including volume overload, respiratory failure, stroke, heart attack, or even death.  They excepted these risks and left the hospital AGAINST MEDICAL ADVICE.  They were advised to return to the hospital should he develop any new symptoms or if he was unable to get dialysis on Monday.    Please see above list of diagnoses and related plan for additional information.     Condition at Discharge: good    Discharge Day Visit / Exam:   Subjective: No new complaints.  Patient feels well.  Denies any chest pain or shortness of breath.  Vitals: Blood Pressure: 111/63 (04/25/25 1730)  Pulse: 80 (04/25/25 1730)  Temperature: 97.5 °F (36.4 °C) (04/25/25 1730)  Temp Source: Oral (04/25/25 1730)  Respirations: 18 (04/25/25 1730)  Weight - Scale: 99.5 kg (219 lb 5.7 oz) (04/25/25 1415)  SpO2: 98 % (04/25/25 0851)  PHYSICAL EXAM:    Vitals signs reviewed  Constitutional   Awake and cooperative. NAD.   Heart   Regular rate and rhythm. No murmurs.   Lungs   Clear to auscultation bilaterally. Respirations unlaboured.   Abdomen   Soft. Nontender. Nondistended.    Extremities   No deformities. No peripheral edema.   Neuro   Alert and oriented. No new deficits.   Psych   Mood stable. Affect normal.         Discussion with Family: Updated  (wife) at bedside.    Discharge instructions/Information to patient and family:   See after visit  summary for information provided to patient and family.      Provisions for Follow-Up Care:  See after visit summary for information related to follow-up care and any pertinent home health orders.       Disposition:   Other: Discharge home AGAINST MEDICAL ADVICE    Planned Readmission: no    Discharge Medications:  See after visit summary for reconciled discharge medications provided to patient and/or family.      Administrative Statements   Discharge Statement:  I have spent a total time of 40 minutes in caring for this patient on the day of the visit/encounter.     **Please Note: This note may have been constructed using a voice recognition system**

## 2025-04-25 NOTE — ASSESSMENT & PLAN NOTE
Likely multifactorial, ESRD, CHF, disuse, multiple hospitalizations  Add protein supplementation  PT/OT  Discussed discharge planning with the patient's son via telephone, he prefers admission to acute rehab at [facility], if not he would prefer to take him home. Consult CM for possible IP rehab placement

## 2025-04-25 NOTE — ASSESSMENT & PLAN NOTE
Left Ventricle: Left ventricular cavity size is dilated. Wall thickness is normal. There is eccentric hypertrophy. The left ventricular ejection fraction is 20%. Systolic function is severely reduced. There is severe global hypokinesis. Diastolic function is severely abnormal, consistent with ungraded restrictive relaxation.    Right Ventricle: Right ventricular cavity size is severely dilated. Systolic function is moderately to severely reduced.    Right Atrium: The atrium is dilated.    Aortic Valve: The aortic valve is trileaflet. The leaflets are moderately calcified. There is moderately reduced mobility. There is moderate regurgitation. There is moderate stenosis. The aortic valve mean gradient is 17 mmHg. The dimensionless velocity index is 0.32. The aortic valve area is 1.59 cm2.    Mitral Valve: There is moderate annular calcification. There is moderate regurgitation.    Tricuspid Valve: There is moderate regurgitation. The right ventricular systolic pressure is severely elevated. The estimated right ventricular systolic pressure is 67.00 mmHg.    Aorta: The aortic root is normal in size. The ascending aorta is mildly dilated. The ascending aorta is 4.1 cm.  Monitor on telemetry while inpatient  Pt has a life vest

## 2025-04-25 NOTE — ED ATTENDING ATTESTATION
4/25/2025  I, Byron Merino DO, saw and evaluated the patient. I have discussed the patient with the resident/non-physician practitioner and agree with the resident's/non-physician practitioner's findings, Plan of Care, and MDM as documented in the resident's/non-physician practitioner's note, except where noted. All available labs and Radiology studies were reviewed.  I was present for key portions of any procedure(s) performed by the resident/non-physician practitioner and I was immediately available to provide assistance.       At this point I agree with the current assessment done in the Emergency Department.  I have conducted an independent evaluation of this patient a history and physical is as follows:    65-year-old man with history of end-stage renal disease on hemodialysis presents for evaluation of possible dialysis as he went to his outpatient center today and they were unable to clear him for dialysis due to recent hospitalization and use of a LifeVest.  Patient was hospitalized earlier this month following a fall exiting his car he was found to be hypotensive he was activated as a trauma alert at [facility] he required intubation as he was unable to tolerate laying flat for the CAT scan he was found to not have significant traumatic injuries but was likely volume overloaded resulting in decompensated heart failure.  He improved after getting dialysis and he was extubated same day.  Patient was discharged home yesterday apparently he was supposed to go to rehab but there were no beds available at home patient is unable to care for himself and his family is unable to provide adequate assistance.  He is very weak requiring full assistance for many ADLs per wife's report.    Impression: End-stage renal disease on hemodialysis, generally weak likely due to recent hospitalization.  Plan: Renal labs, likely admit to hospital for rehab placement anyway may require dialysis while  here.      ED Course         Critical Care Time  Procedures

## 2025-04-25 NOTE — ASSESSMENT & PLAN NOTE
Multivessel CAD status post PCI, ischemic cardiomyopathy new reduced EF 20%.  LifeVest in place.  Follows with cardiology

## 2025-04-25 NOTE — ASSESSMENT & PLAN NOTE
Lab Results   Component Value Date    EGFR 7 04/25/2025    EGFR 8 04/24/2025    EGFR 6 04/23/2025    CREATININE 7.30 (H) 04/25/2025    CREATININE 6.45 (H) 04/24/2025    CREATININE 8.09 (H) 04/23/2025   Continue HD M, W, F while inpatient  Nephrology following  Continue midodrine prior to HD  Consult with CM for an HD unit that can accommodate the life vest

## 2025-04-25 NOTE — ASSESSMENT & PLAN NOTE
Lab Results   Component Value Date    EGFR 7 04/25/2025    EGFR 8 04/24/2025    EGFR 6 04/23/2025    CREATININE 7.30 (H) 04/25/2025    CREATININE 6.45 (H) 04/24/2025    CREATININE 8.09 (H) 04/23/2025   Nephrology following  Outpatient center unable to accommodate life vest until staff is trained   Midodrine per nephrology

## 2025-04-25 NOTE — ASSESSMENT & PLAN NOTE
PT/OT while on acute care.  Therapy evaluations are pending however there continues to be concern the patient will not tolerate acute inpatient rehabilitation. Will follow up functional and medical progress.

## 2025-04-25 NOTE — ASSESSMENT & PLAN NOTE
Wt Readings from Last 3 Encounters:   04/25/25 99.5 kg (219 lb 5.7 oz)   04/24/25 98.6 kg (217 lb 6 oz)   04/09/25 100 kg (221 lb)   Ef is 20% per echocardiogram last week  Continue metoprolol  Midorine for BP support  Pt has a life vest  HD for volume managment

## 2025-04-26 ENCOUNTER — HOME CARE VISIT (OUTPATIENT)
Dept: HOME HEALTH SERVICES | Facility: HOME HEALTHCARE | Age: 65
End: 2025-04-26
Payer: MEDICARE

## 2025-04-26 VITALS
TEMPERATURE: 98 F | BODY MASS INDEX: 31.74 KG/M2 | OXYGEN SATURATION: 100 % | HEIGHT: 68 IN | RESPIRATION RATE: 18 BRPM | WEIGHT: 209.44 LBS | SYSTOLIC BLOOD PRESSURE: 122 MMHG | HEART RATE: 78 BPM | DIASTOLIC BLOOD PRESSURE: 80 MMHG

## 2025-04-26 PROCEDURE — G0299 HHS/HOSPICE OF RN EA 15 MIN: HCPCS

## 2025-04-26 PROCEDURE — 10330081 VN NO-PAY CLAIM PROCEDURE

## 2025-04-26 PROCEDURE — 400013 VN SOC

## 2025-04-28 ENCOUNTER — TELEPHONE (OUTPATIENT)
Age: 65
End: 2025-04-28

## 2025-04-28 PROCEDURE — 10330064 DRESSING, XEROFORM STR 5X9" (50/BX)"

## 2025-04-28 PROCEDURE — 10330064 FOAM, ADH SIL W/BORDER 4X4" (10/BX 20BX"

## 2025-04-28 PROCEDURE — 10330064 CLEANSER, WOUND DERMAL SPRAY 8OZ (6/CS)

## 2025-04-28 PROCEDURE — 10330064 SPONGE, GAUZE 8PLY N/S 4X4" (200/PK 20P"

## 2025-04-28 NOTE — TELEPHONE ENCOUNTER
Caller: Asad     Doctor: Dr. Tan     Reason for call: patient was recently in ED and requested an appt only w/ Dr. Tan. Offered NP or PA but stated he only preferred Dr. Tan. Was having an upcoming surgery as well and wanted provider to try and squeeze him in around lunch break if possible. Requested a message be sent to provider or a phone call back.     Call back#: [phone]

## 2025-04-29 ENCOUNTER — HOME CARE VISIT (OUTPATIENT)
Dept: HOME HEALTH SERVICES | Facility: HOME HEALTHCARE | Age: 65
End: 2025-04-29
Payer: MEDICARE

## 2025-04-29 ENCOUNTER — TELEMEDICINE (OUTPATIENT)
Dept: FAMILY MEDICINE CLINIC | Facility: CLINIC | Age: 65
End: 2025-04-29
Payer: MEDICARE

## 2025-04-29 ENCOUNTER — TELEPHONE (OUTPATIENT)
Age: 65
End: 2025-04-29

## 2025-04-29 ENCOUNTER — APPOINTMENT (OUTPATIENT)
Facility: CLINIC | Age: 65
End: 2025-04-29
Payer: MEDICARE

## 2025-04-29 ENCOUNTER — APPOINTMENT (OUTPATIENT)
Facility: CLINIC | Age: 65
End: 2025-04-29
Attending: PHYSICIAN ASSISTANT
Payer: MEDICARE

## 2025-04-29 ENCOUNTER — EPISODE CHANGES (OUTPATIENT)
Dept: GERIATRICS | Facility: CLINIC | Age: 65
End: 2025-04-29

## 2025-04-29 VITALS — DIASTOLIC BLOOD PRESSURE: 55 MMHG | HEART RATE: 73 BPM | SYSTOLIC BLOOD PRESSURE: 110 MMHG

## 2025-04-29 DIAGNOSIS — I82.451 ACUTE EMBOLISM AND THROMBOSIS OF RIGHT PERONEAL VEIN (HCC): ICD-10-CM

## 2025-04-29 DIAGNOSIS — I35.0 NONRHEUMATIC AORTIC VALVE STENOSIS: ICD-10-CM
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
## 2025-04-29 DIAGNOSIS — Z99.2 ESRD ON DIALYSIS (HCC): ICD-10-CM

## 2025-04-29 DIAGNOSIS — I25.10 CAD, MULTIPLE VESSEL: ICD-10-CM

## 2025-04-29 DIAGNOSIS — I25.5 ISCHEMIC CARDIOMYOPATHY: ICD-10-CM

## 2025-04-29 DIAGNOSIS — I50.20 HFREF (HEART FAILURE WITH REDUCED EJECTION FRACTION) (HCC): ICD-10-CM

## 2025-04-29 DIAGNOSIS — J96.01 ACUTE HYPOXIC RESPIRATORY FAILURE (HCC): ICD-10-CM

## 2025-04-29 DIAGNOSIS — N18.6 ESRD ON DIALYSIS (HCC): ICD-10-CM

## 2025-04-29 DIAGNOSIS — I95.9 HYPOTENSION, UNSPECIFIED HYPOTENSION TYPE: ICD-10-CM

## 2025-04-29 DIAGNOSIS — Z76.89 ENCOUNTER FOR SUPPORT AND COORDINATION OF TRANSITION OF CARE: Primary | ICD-10-CM

## 2025-04-29 PROCEDURE — 99496 TRANSJ CARE MGMT HIGH F2F 7D: CPT

## 2025-04-29 PROCEDURE — G0151 HHCP-SERV OF PT,EA 15 MIN: HCPCS

## 2025-04-29 RX ORDER — ACETAMINOPHEN 325 MG/1
TABLET ORAL
COMMUNITY
Start: 2025-03-15 | End: 2025-04-29 | Stop reason: ALTCHOICE

## 2025-04-29 NOTE — TELEPHONE ENCOUNTER
"Hello,    The following message was sent via e-mail to the leadership team:     Please advise if you can help facilitate the following overbook request:    Patient Name: Asad (patient)    Patient MRN: [id number]    Call back #: [phone]    Insurance: Medicare A/B    Department:Cardiology    Speciality: General Cardiology    Reason for overbook request: PATIENT REQUEST    Comments (Write \"N/a\" if no comments): 4/29/25 patient called to schedule hospital f/u specifically with Dr. Tan (offered AMANDO, but declined) as patient states Dr. Tan knows his cardiac illness intimately, usually squeezes patient in, and patient has TAVR procedure in May that patient wants to discuss with Dr. Tan. Scheduled first available with Dr. Tan 7/23/25. Added to wait list. Note: 4/17- - 4/24/25 [facility].  Cardio consult: Ghislaine Cobian NP / Dr. Nascimento states; \"Patient has scheduled procedures in May for TAVR. Suspect his fall was due to low blood pressure or ambulatory dysfunction.  Continue current home cardiac medications no further cardiac testing is needed, will follow-up tomorrow to review telemetry.\"       Requested doctor and location: Dr. Tan. Izzy preferred. Will consider other locations due to patient stated urgency for hospital follow up and upcoming TAVR procedure in May    Date of current appointment: 7/23/25      Thank you.  "

## 2025-04-29 NOTE — ASSESSMENT & PLAN NOTE
Lab Results   Component Value Date    EGFR 7 04/25/2025    EGFR 8 04/24/2025    EGFR 6 04/23/2025    CREATININE 7.30 (H) 04/25/2025    CREATININE 6.45 (H) 04/24/2025    CREATININE 8.09 (H) 04/23/2025     Pt with new lifevest was unable to receive dialysis at home center, presented to ED 4/25/25 for HD and discharged. Per son was able to receive dialysis 4/28 and scheduled 4/30 at [facility].

## 2025-04-29 NOTE — ASSESSMENT & PLAN NOTE
Wt Readings from Last 3 Encounters:   04/26/25 95 kg (209 lb 7 oz)   04/25/25 99.5 kg (219 lb 5.7 oz)   04/24/25 98.6 kg (217 lb 6 oz)     Echo 4/17/25 EF 20%, abnormal diastolic function, AS, AI.   BP per son averaging 110/60, administers midodrine prn prior to HD  Entresto stopped during hospital stay due to low BP.   Current Rx: Metoprolol succ 25 mg BID, pt taking daily.  Pt is wearing lifevest scheduled to f/u with cardiologist Dr aTn 7/23/25.

## 2025-04-29 NOTE — ASSESSMENT & PLAN NOTE
Echo 4/17/25 EF 20%, abnormal diastolic function, AS, AI. Pt wearing lifevest, follows outpt with cardiologist Dr Sheikh Swain stopped during hospital stay due to low BP.   Current Rx: Metoprolol succ 25 mg BID, pt taking daily

## 2025-04-29 NOTE — ASSESSMENT & PLAN NOTE
Noted to  be hypoxic at CT when laid flat with hypotension and subsequent intubation in ED. CT chest: No large PE, Cardiomegaly with small right pleural effusion and interstitial edema. Cirrhosis with ascites.  CXR: Mild pulmonary vascular congestion with trace pleural effusions  MRSA neg  Pt improved and extubated shortly after sats have been stable per son , /60 today.

## 2025-04-29 NOTE — ASSESSMENT & PLAN NOTE
S/p stent 2/27/2025 [facility]: Mid circumflex 10%, first obtuse marginal 100%, first %, severe in-stent restenosis of LAD and  of OM1 ([facility])  3/17/2025 [facility] LM normal, proximal LAD 90%, circumflex luminal irregularities, first obtuse marginal 100%, right system not described, successful balloon angioplasty to proximal LAD with 0% residual stenosis ([facility].  Pt taking metoprolol 25 mg qd  I/o bid secondary to hypotension, prasugrel 10 mg and apixaban 2.5 mg bid and ASA 81 mg qd. Pt scheduled to f/u with

## 2025-04-29 NOTE — PROGRESS NOTES
Virtual TCM Visit:Name: Asad Galloway      : 1960      MRN: [id number]  Encounter Provider: BRITTANY Allen  Encounter Date: 2025   Encounter department: [facility]  :  Assessment & Plan  Encounter for support and coordination of transition of care         Fall, sequela  S/p fall 25 attempting to ambulate without walker. Pt recommended for STR family declined per son has [facility] at home PT/OT M-F denies any additional falls.       CAD, multiple vessel  S/p stent 2025 [facility]: Mid circumflex 10%, first obtuse marginal 100%, first %, severe in-stent restenosis of LAD and  of OM1 ([facility])  3/17/2025 [facility] LM normal, proximal LAD 90%, circumflex luminal irregularities, first obtuse marginal 100%, right system not described, successful balloon angioplasty to proximal LAD with 0% residual stenosis ([facility].  Pt taking metoprolol 25 mg qd  I/o bid secondary to hypotension, prasugrel 10 mg and apixaban 2.5 mg bid and ASA 81 mg qd. Pt scheduled to f/u with        Acute hypoxic respiratory failure (HCC)  Noted to  be hypoxic at CT when laid flat with hypotension and subsequent intubation in ED. CT chest: No large PE, Cardiomegaly with small right pleural effusion and interstitial edema. Cirrhosis with ascites.  CXR: Mild pulmonary vascular congestion with trace pleural effusions  MRSA neg  Pt improved and extubated shortly after sats have been stable per son , /60 today.       Hypotension, unspecified hypotension type  Stable /60, takes midodrine prn prior to HD.        HFrEF (heart failure with reduced ejection fraction) ([facility])  Wt Readings from Last 3 Encounters:   25 95 kg (209 lb 7 oz)   25 99.5 kg (219 lb 5.7 oz)   25 98.6 kg (217 lb 6 oz)     Echo 25 EF 20%, abnormal diastolic function, AS, AI.   BP per son averaging 110/60, administers midodrine prn prior to HD  Entresto stopped during hospital stay  due to low BP.   Current Rx: Metoprolol succ 25 mg BID, pt taking daily.  Pt is wearing lifevest scheduled to f/u with cardiologist Dr Tan 7/23/25.         Nonrheumatic aortic valve stenosis  Followed at [place], pt scheduled for TAVR 5/29/25       ESRD on dialysis (HCC)  Lab Results   Component Value Date    EGFR 7 04/25/2025    EGFR 8 04/24/2025    EGFR 6 04/23/2025    CREATININE 7.30 (H) 04/25/2025    CREATININE 6.45 (H) 04/24/2025    CREATININE 8.09 (H) 04/23/2025     Pt with new lifevest was unable to receive dialysis at home center, presented to ED 4/25/25 for HD and discharged. Per son was able to receive dialysis 4/28 and scheduled 4/30 at [facility].       Acute embolism and thrombosis of right peroneal vein (HCC)  Continue eliquis 2.5 mg bid per hematology.       Ischemic cardiomyopathy  Echo 4/17/25 EF 20%, abnormal diastolic function, AS, AI. Pt wearing lifevest, follows outpt with cardiologist Dr Sheikh Swain stopped during hospital stay due to low BP.   Current Rx: Metoprolol succ 25 mg BID, pt taking daily              History of Present Illness     Transitional Care Management Review:   Asad Galloway is a 65 y.o. male here for TCM follow up.    During the TCM phone call patient stated:  TCM Call (since 4/15/2025)     Date and time call was made  4/24/2025  2:27 PM    Hospital care reviewed  Records reviewed    Patient was hospitialized at  [facility]    Date of Admission  04/17/25    Date of discharge  04/24/25    Diagnosis  Fall    Disposition  Home    Were the patients medications reviewed and updated  Yes    Current Symptoms  None      TCM Call (since 4/15/2025)     Post hospital issues  None    Scheduled for follow up?  Yes    Did you obtain your prescribed medications  Yes    Do you need help managing your prescriptions or medications  No    Is transportation to your appointment needed  No    I have advised the patient to call PCP with any new or worsening symptoms  [place]  Lee MENDOZA    Living Arrangements  Family members    Support System  None    Are you recieving home care services  Yes    Types of home care services  Home health aid        TCM follow-up pt and son Sam via video call    Pt s/p hospitalization 4/17/25 s/p fall ambulating w/o walker. Found to  be hypotensive Trauma scans without fracture, CTH negative  · CT ab/pel: Small subcutaneous hematoma of right hip without active bleed.  Otherwise no acute traumatic injury.  Cirrhosis with portal hypertension and small volume ascites.  Pt ESRD On admission Hb 9.5, s/p 2 unit whole blood for suspected hemorrhagic shock  Folate wnl · B12 384 Started vitamin B12 1000 mcg daily, son will f/u with nephrology regarding ongoing vit B 12 supplementation. MWF schedule at [facility] resumed, seen in ED 4/25/26 due to Dialysis center not able to accommodate new life vest    Pt restarted on Eliquis 2.5 mg bid by hematologist secondary to Hx of upper extremity DVT in right upper extremity     [facility] and other work-up  required for TAVR scheduled for 5/29/25    Ischemic cardiomyopathy, new reduced EF 20%:  - continue metoprolol 25 mg BID, per son taking daily secondary to hypotension  - LifeVest ordered for discharge pt is wearing daily and scheduled to  f/u oupt with cardiologist Dr Tan        Review of Systems   Constitutional:  Negative for activity change, appetite change, chills, diaphoresis, fatigue, fever and unexpected weight change.   HENT:  Negative for congestion, dental problem, drooling, ear discharge, ear pain, facial swelling, hearing loss, mouth sores, nosebleeds, postnasal drip, rhinorrhea, sinus pressure, sinus pain, sneezing, sore throat, tinnitus, trouble swallowing and voice change.    Eyes:  Negative for photophobia, pain, discharge, redness, itching and visual disturbance.   Respiratory:  Positive for cough. Negative for apnea, choking, chest tightness, shortness of breath, wheezing  and stridor.    Cardiovascular:  Negative for chest pain, palpitations and leg swelling.   Gastrointestinal:  Negative for abdominal distention, abdominal pain, anal bleeding, blood in stool, constipation, diarrhea, nausea and vomiting.   Endocrine: Negative for cold intolerance, heat intolerance, polydipsia, polyphagia and polyuria.   Genitourinary:  Negative for decreased urine volume, difficulty urinating, dysuria, flank pain, frequency, hematuria, penile discharge, scrotal swelling, testicular pain and urgency.   Musculoskeletal:  Positive for arthralgias and gait problem. Negative for back pain, joint swelling, myalgias, neck pain and neck stiffness.   Skin:  Negative for color change, rash and wound.   Allergic/Immunologic: Negative for environmental allergies, food allergies and immunocompromised state.   Neurological:  Positive for weakness. Negative for dizziness, tremors, seizures, syncope, facial asymmetry, speech difficulty, light-headedness, numbness and headaches.   Hematological:  Negative for adenopathy. Does not bruise/bleed easily.   Psychiatric/Behavioral:  Negative for agitation, behavioral problems, confusion, decreased concentration, dysphoric mood, hallucinations, self-injury, sleep disturbance and suicidal ideas. The patient is not nervous/anxious and is not hyperactive.    All other systems reviewed and are negative.    Objective   There were no vitals taken for this visit.    Physical Exam  Vitals and nursing note reviewed.   Constitutional:       General: He is not in acute distress.     Appearance: Normal appearance. He is not ill-appearing, toxic-appearing or diaphoretic.   HENT:      Head: Normocephalic and atraumatic.   Eyes:      Conjunctiva/sclera: Conjunctivae normal.   Neurological:      Mental Status: He is alert. Mental status is at baseline.   Psychiatric:         Mood and Affect: Mood normal.         Behavior: Behavior normal.       Medications have been reviewed by provider in  current encounter    Administrative Statements   Encounter provider BRITTANY Allen    The Patient is located at Home and in the following state in which I hold an active license [place].    The patient was identified by name and date of birth. Asad Galloway was informed that this is a telemedicine visit and that the visit is being conducted through the Epic Embedded platform. He agrees to proceed..  My office door was closed. No one else was in the room.  He acknowledged consent and understanding of privacy and security of the video platform. The patient has agreed to participate and understands they can discontinue the visit at any time.    I have spent a total time of 10 minutes in caring for this patient on the day of the visit/encounter including Risks and benefits of tx options, Instructions for management, Patient and family education, Impressions, Counseling / Coordination of care, Documenting in the medical record, and Obtaining or reviewing history  , not including the time spent for establishing the audio/video connection.    BRITTANY Allen

## 2025-04-29 NOTE — ASSESSMENT & PLAN NOTE
S/p fall 4/17/25 attempting to ambulate without walker. Pt recommended for STR family declined per son has [facility] at home PT/OT M-F denies any additional falls.

## 2025-04-30 ENCOUNTER — APPOINTMENT (OUTPATIENT)
Dept: LAB | Facility: CLINIC | Age: 65
End: 2025-04-30
Payer: MEDICARE

## 2025-04-30 ENCOUNTER — HOME CARE VISIT (OUTPATIENT)
Dept: HOME HEALTH SERVICES | Facility: HOME HEALTHCARE | Age: 65
End: 2025-04-30
Payer: MEDICARE

## 2025-04-30 DIAGNOSIS — Z86.718 HISTORY OF DVT (DEEP VEIN THROMBOSIS): ICD-10-CM

## 2025-04-30 LAB
BASOPHILS # BLD AUTO: 0.04 THOUSANDS/ÂΜL (ref 0–0.1)
BASOPHILS NFR BLD AUTO: 0 % (ref 0–1)
EOSINOPHIL # BLD AUTO: 0.17 THOUSAND/ÂΜL (ref 0–0.61)
EOSINOPHIL NFR BLD AUTO: 2 % (ref 0–6)
ERYTHROCYTE [DISTWIDTH] IN BLOOD BY AUTOMATED COUNT: 16.7 % (ref 11.6–15.1)
HCT VFR BLD AUTO: 37.9 % (ref 36.5–49.3)
HGB BLD-MCNC: 11.5 G/DL (ref 12–17)
IMM GRANULOCYTES # BLD AUTO: 0.07 THOUSAND/UL (ref 0–0.2)
IMM GRANULOCYTES NFR BLD AUTO: 1 % (ref 0–2)
LYMPHOCYTES # BLD AUTO: 0.96 THOUSANDS/ÂΜL (ref 0.6–4.47)
LYMPHOCYTES NFR BLD AUTO: 11 % (ref 14–44)
MCH RBC QN AUTO: 30.3 PG (ref 26.8–34.3)
MCHC RBC AUTO-ENTMCNC: 30.3 G/DL (ref 31.4–37.4)
MCV RBC AUTO: 100 FL (ref 82–98)
MONOCYTES # BLD AUTO: 0.85 THOUSAND/ÂΜL (ref 0.17–1.22)
MONOCYTES NFR BLD AUTO: 10 % (ref 4–12)
NEUTROPHILS # BLD AUTO: 6.86 THOUSANDS/ÂΜL (ref 1.85–7.62)
NEUTS SEG NFR BLD AUTO: 76 % (ref 43–75)
NRBC BLD AUTO-RTO: 0 /100 WBCS
PLATELET # BLD AUTO: 104 THOUSANDS/UL (ref 149–390)
PMV BLD AUTO: 11.8 FL (ref 8.9–12.7)
RBC # BLD AUTO: 3.79 MILLION/UL (ref 3.88–5.62)
WBC # BLD AUTO: 8.95 THOUSAND/UL (ref 4.31–10.16)

## 2025-04-30 PROCEDURE — 85025 COMPLETE CBC W/AUTO DIFF WBC: CPT

## 2025-04-30 PROCEDURE — 36415 COLL VENOUS BLD VENIPUNCTURE: CPT

## 2025-05-01 ENCOUNTER — HOME CARE VISIT (OUTPATIENT)
Dept: HOME HEALTH SERVICES | Facility: HOME HEALTHCARE | Age: 65
End: 2025-05-01
Payer: MEDICARE

## 2025-05-01 ENCOUNTER — OFFICE VISIT (OUTPATIENT)
Dept: CARDIOLOGY CLINIC | Facility: CLINIC | Age: 65
End: 2025-05-01
Payer: MEDICARE

## 2025-05-01 VITALS
DIASTOLIC BLOOD PRESSURE: 56 MMHG | SYSTOLIC BLOOD PRESSURE: 92 MMHG | OXYGEN SATURATION: 99 % | BODY MASS INDEX: 30.96 KG/M2 | WEIGHT: 209 LBS | HEART RATE: 70 BPM | HEIGHT: 69 IN

## 2025-05-01 DIAGNOSIS — Z95.810 PRESENCE OF EXTERNAL CARDIAC DEFIBRILLATOR: ICD-10-CM

## 2025-05-01 DIAGNOSIS — I25.5 ISCHEMIC CARDIOMYOPATHY: ICD-10-CM

## 2025-05-01 DIAGNOSIS — I50.22 CHRONIC SYSTOLIC HEART FAILURE (HCC): Primary | ICD-10-CM

## 2025-05-01 DIAGNOSIS — I10 PRIMARY HYPERTENSION: ICD-10-CM

## 2025-05-01 DIAGNOSIS — Z95.5 STATUS POST INSERTION OF DRUG ELUTING CORONARY ARTERY STENT: ICD-10-CM

## 2025-05-01 DIAGNOSIS — Z86.718 HISTORY OF DVT (DEEP VEIN THROMBOSIS): ICD-10-CM

## 2025-05-01 DIAGNOSIS — N18.6 ESRD ON DIALYSIS (HCC): ICD-10-CM

## 2025-05-01 DIAGNOSIS — Z99.2 ESRD ON DIALYSIS (HCC): ICD-10-CM

## 2025-05-01 DIAGNOSIS — I35.0 NONRHEUMATIC AORTIC VALVE STENOSIS: ICD-10-CM

## 2025-05-01 DIAGNOSIS — I25.10 CAD, MULTIPLE VESSEL: ICD-10-CM

## 2025-05-01 PROCEDURE — G0180 MD CERTIFICATION HHA PATIENT: HCPCS | Performed by: STUDENT IN AN ORGANIZED HEALTH CARE EDUCATION/TRAINING PROGRAM

## 2025-05-01 PROCEDURE — 99214 OFFICE O/P EST MOD 30 MIN: CPT | Performed by: INTERNAL MEDICINE
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
## 2025-05-01 NOTE — PATIENT INSTRUCTIONS
You were seen today in the Cardiology office for follow up evaluation.     Below is a summary of the recommendations based upon today's visit:    1. Dietary modifications - Follow a Mediterranean diet - one that is low in saturated fats (avoid red meat, dairy, cheeses), emphasize chicken, fish, turkey, tofu, vegetables, fruit (moderate quantities); also avoid simple carbs/starch/sugars, use whole wheat/grain/bran/oatmeal, snack on small quantities of nuts and fruits.     2. Exercise is very important. Ideally, AT LEAST four to five times weekly for 30 to 60 minutes per session - both cardiovascular and resistance work is OK. Listen to your body and rest when needed.    3. Continue all present medications    4. Testing prior to your next visit includes: ultrasound of the heart    5. Remember the ABCDEs to cardiovascular health for patients:  A: Awareness of cardiovascular symptoms  B: Blood pressure control  C: Cholesterol control  C: Cigarette avoidance  D: Diabetes control  D: Dietary choices  E: Exercise    6. Return in 1 months     Any testing ordered in office today will be available for patient review via [employer], and reviewed with me at the follow-up office visit as scheduled.    Thank you for choosing [facility].    Please call our office or use [employer] with any questions.

## 2025-05-02 ENCOUNTER — TELEMEDICINE (OUTPATIENT)
Dept: GERIATRICS | Facility: CLINIC | Age: 65
End: 2025-05-02
Payer: MEDICARE

## 2025-05-02 ENCOUNTER — HOME CARE VISIT (OUTPATIENT)
Dept: HOME HEALTH SERVICES | Facility: HOME HEALTHCARE | Age: 65
End: 2025-05-02
Payer: MEDICARE

## 2025-05-02 VITALS
DIASTOLIC BLOOD PRESSURE: 56 MMHG | TEMPERATURE: 97.1 F | BODY MASS INDEX: 30.72 KG/M2 | WEIGHT: 208 LBS | RESPIRATION RATE: 18 BRPM | SYSTOLIC BLOOD PRESSURE: 114 MMHG | HEART RATE: 66 BPM | OXYGEN SATURATION: 96 %

## 2025-05-02 DIAGNOSIS — Z99.2 ESRD ON DIALYSIS (HCC): Primary | ICD-10-CM

## 2025-05-02 DIAGNOSIS — I95.9 HYPOTENSION, UNSPECIFIED HYPOTENSION TYPE: ICD-10-CM

## 2025-05-02 DIAGNOSIS — I50.20 HFREF (HEART FAILURE WITH REDUCED EJECTION FRACTION) (HCC): ICD-10-CM

## 2025-05-02 DIAGNOSIS — E11.42 DIABETIC POLYNEUROPATHY ASSOCIATED WITH TYPE 2 DIABETES MELLITUS (HCC): ICD-10-CM

## 2025-05-02 DIAGNOSIS — I25.5 ISCHEMIC CARDIOMYOPATHY: ICD-10-CM

## 2025-05-02 DIAGNOSIS — N18.6 ESRD ON DIALYSIS (HCC): Primary | ICD-10-CM

## 2025-05-02 DIAGNOSIS — R26.2 AMBULATORY DYSFUNCTION: ICD-10-CM

## 2025-05-02 DIAGNOSIS — I25.10 CAD, MULTIPLE VESSEL: ICD-10-CM

## 2025-05-02 DIAGNOSIS — I82.451 ACUTE EMBOLISM AND THROMBOSIS OF RIGHT PERONEAL VEIN (HCC): ICD-10-CM

## 2025-05-02 PROCEDURE — 99215 OFFICE O/P EST HI 40 MIN: CPT | Performed by: NURSE PRACTITIONER

## 2025-05-02 PROCEDURE — G0299 HHS/HOSPICE OF RN EA 15 MIN: HCPCS

## 2025-05-02 NOTE — PROGRESS NOTES
Virtual Regular VisitName: Asad Galloway      : 1960      MRN: [id number]  Encounter Provider: BRITTANY Mirza  Encounter Date: 2025   Encounter department: [facility] VIRTUAL  :  Assessment & Plan  ESRD on dialysis ([facility])  Lab Results   Component Value Date    EGFR 7 2025    EGFR 8 2025    EGFR 6 2025    CREATININE 7.30 (H) 2025    CREATININE 6.45 (H) 2025    CREATININE 8.09 (H) 2025   Continue outpatient HD 3 times weekly  Encourage renal diet compliance and monitor fluid intake  Continue all renal supplements  Follow up with Nephrology     CAD, multiple vessel  Patient with multiple stents, recently 25 and 3/17/25  Continue Metoprolol, Entresto, Prasugrel, and Eliquis  Follow up with Cardiology    HFrEF (heart failure with reduced ejection fraction) ([facility])  Wt Readings from Last 3 Encounters:   25 94.8 kg (209 lb)   25 95 kg (209 lb 7 oz)   25 99.5 kg (219 lb 5.7 oz)   Echo 25 EF 20%, abnormal diastolic function, AS, AI. Current wt   Continue to monitor weights, 208 lbs  Continue to monitor BMP periodicallly  Continue GDMT Metoprolol 25 mg po QD   Continue NINA diet  Continue to monitor for s/s of fluid overload  Follow up with PCP/Cardiology    Ischemic cardiomyopathy  Patient with life vest in place  Echo 25 EF 20%, abnormal diastolic function, AS, AI   Continue Metoprolol 25 mg po QD  Follow up with Cardiology      Acute embolism and thrombosis of right peroneal vein ([facility])  History of DVT right UE  Continue Eliquis and Prasugrel  Follow up with vascular prn      Hypotension, unspecified hypotension type  BP remains stable today, 114/56  Continue Midodrine 10 mg po prior to dialysis  Encourage patient to rise slowly from seated to standing position  Maintain fall and safety precautions  Continue to monitor BP and for acute changes in condition  Follow up with PCP/Cardiology      Diabetic polyneuropathy  associated with type 2 diabetes mellitus (HCC)  Lab Results   Component Value Date    HGBA1C 5.4 04/10/2025   Bgs well controlled  Patient is currently diet controlled  Continue intermittent Accu-cheks   Educate patient on s/s of hyper/hypoglycemia  Continue to monitor for acute changes in condition   Follow up with PCP/Endocrinology     Ambulatory dysfunction  History of falls  Maintain fall and safety precautions  Encourage use of asst devices  Continue PT/OT services with [facility]  Assess for need with assistance with transfers, mobility, and ADLs in/out of home  Patient is at high risk for falls r/t ESRD on HD, neuropathy, dyspnea, polypharmacy, environmental barriers, and age  Continue to monitor for acute changes in condition   Follow up with PCP         History of Present Illness        Asad Galloway is a 65 y.o. male patient, being seen for [facility] in conjuction with [facility] nurse Osiris Weldon, who was in the home to perform physical assessment.      The patient is being seen and examined today for follow-up on acute and chronic medical conditions s/p most recent hospitalization.     The patient reports he is doing well other than fatigue r/t HD. He continues with HD 3 times weekly. He reports his weight yesterday was 208 lbs. His wife reports bgs readings have been stable. Reviewed most recent labs with patient and his wife. Patient denies any increase in dyspnea, CP, dizziness, HA, fever, chills, nausea, vomiting, diarrhea, or constipation.            Review of Systems   Constitutional:  Positive for activity change and fatigue. Negative for chills and fever.   HENT:  Positive for hearing loss. Negative for ear pain and sore throat.    Eyes:  Negative for pain and visual disturbance.   Respiratory:  Positive for shortness of breath. Negative for cough.    Cardiovascular:  Positive for leg swelling. Negative for chest pain and palpitations.   Gastrointestinal:  Negative for abdominal pain and  vomiting.   Genitourinary:  Negative for dysuria and hematuria.   Musculoskeletal:  Positive for arthralgias and gait problem. Negative for back pain.   Skin:  Negative for color change and rash.   Neurological:  Positive for weakness. Negative for seizures and syncope.   Psychiatric/Behavioral:  Positive for confusion.    All other systems reviewed and are negative.    Pertinent Medical History   Patient presented to ED 4/17 after fall in parking lot suspected to be related to low BP. Trauma workup negative except for right hip hematoma. Admitted to ICU given shock, received 2 units whole blood and placed on levophed. Became hypoxic requiring intubation. CXR concerning for CHF exacerbation. Echo showed new drop in EF to 20-25%. Cardiology was consulted. Extubated to bipap and weaned to NC then RA. Infectious w/u negative. Nephrology consulted for dialysis. Had low Bps, given albumin, midodrine. Metoprolol added back on. Discontinued entresto per cardiology. Nephrology increased midodrine to 7.5 mg tid and 10 mg before dialysis. Sensipar held off by nephro given low calcium. Vitamin B12 was low and supplement added. PT/OT saw patient and rec level 1. Patient denied for ARC. Family did not want STR. Patient having some upper airway irritation likely from post intubation- was dc with medrol dose hood, cleared by cardiology. Patient was given Lifevest. Patient was stable for dc and discharged home.     Objective   Vitals    Vitals 5/2/2025 10:24 AM   /56   BP Location Right arm   Patient Position Sitting   Cuff Size Adult   Resp 18   Weight 94.3 kg (208 lb)   SpO2 96 %   SpO2 Activity At Rest   Pulse 66   Temp (!) 97.1 °F (36.2 °C)   Temp src Temporal   Stated Weight    94.3 kg (208 lb)    4/30/2025  1:03 PM    Component Value Flag Ref Range Units Status   WBC 8.95  4.31 - 10.16 Thousand/uL Final   RBC 3.79  Low  3.88 - 5.62 Million/uL Final   Hemoglobin 11.5  Low  12.0 - 17.0 g/dL Final   Hematocrit 37.9  36.5 -  49.3 % Final     High  82 - 98 fL Final   MCH 30.3  26.8 - 34.3 pg Final   MCHC 30.3  Low  31.4 - 37.4 g/dL Final   RDW 16.7  High  11.6 - 15.1 % Final   MPV 11.8  8.9 - 12.7 fL Final   Platelets 104  Low  149 - 390 Thousands/uL Final   nRBC 0   /100 WBCs Final   Segmented % 76  High  43 - 75 % Final   Immature Grans % 1  0 - 2 % Final   Lymphocytes % 11  Low  14 - 44 % Final   Monocytes % 10  4 - 12 % Final   Eosinophils Relative 2  0 - 6 % Final   Basophils Relative 0  0 - 1 % Final   Absolute Neutrophils 6.86  1.85 - 7.62 Thousands/µL Final   Absolute Immature Grans 0.07  0.00 - 0.20 Thousand/uL Final   Absolute Lymphocytes 0.96  0.60 - 4.47 Thousands/µL Final   Absolute Monocytes 0.85  0.17 - 1.22 Thousand/µL Final   Eosinophils Absolute 0.17  0.00 - 0.61 Thousand/µL Final   Basophils Absolute 0.04  0.00 - 0.10 Thousands/µL Final       Physical Exam  Vitals and nursing note reviewed.   Constitutional:       General: He is not in acute distress.     Appearance: He is well-developed.   HENT:      Head: Normocephalic and atraumatic.     Eyes:      Conjunctiva/sclera: Conjunctivae normal.      Comments: Wears glasses     Cardiovascular:      Rate and Rhythm: Normal rate and regular rhythm.      Heart sounds: No murmur heard.  Pulmonary:      Effort: Pulmonary effort is normal. No respiratory distress.      Breath sounds: Decreased breath sounds present.   Abdominal:      Palpations: Abdomen is soft.      Tenderness: There is no abdominal tenderness.     Musculoskeletal:         General: No swelling.      Cervical back: Neck supple.      Right lower leg: Edema present.      Left lower leg: Edema present.     Skin:     General: Skin is warm and dry.      Capillary Refill: Capillary refill takes less than 2 seconds.     Neurological:      Mental Status: He is alert.      Motor: Weakness present.      Coordination: Coordination abnormal.      Gait: Gait abnormal.     Psychiatric:         Mood and Affect: Mood  normal.         Cognition and Memory: Memory is impaired. He exhibits impaired recent memory and impaired remote memory.         Administrative Statements   Encounter provider BRITTANY Mirza    The Patient is located at Home and in the following state in which I hold an active license [place].    The patient was identified by name and date of birth. Asad Galloway was informed that this is a telemedicine visit and that the visit is being conducted through the [employer] Teams platform. He agrees to proceed..  My office door was closed. No one else was in the room.  He acknowledged consent and understanding of privacy and security of the video platform. The patient has agreed to participate and understands they can discontinue the visit at any time.    I have spent a total time of 43 minutes in caring for this patient on the day of the visit/encounter including Diagnostic results, Prognosis, Risks and benefits of tx options, Instructions for management, Patient and family education, Importance of tx compliance, Risk factor reductions, Impressions, Counseling / Coordination of care, Documenting in the medical record, Reviewing/placing orders in the medical record (including tests, medications, and/or procedures), Obtaining or reviewing history  , and Communicating with other healthcare professionals , not including the time spent for establishing the audio/video connection.

## 2025-05-02 NOTE — ASSESSMENT & PLAN NOTE
Lab Results   Component Value Date    HGBA1C 5.4 04/10/2025   Bgs well controlled  Patient is currently diet controlled  Continue intermittent Accu-cheks   Educate patient on s/s of hyper/hypoglycemia  Continue to monitor for acute changes in condition   Follow up with PCP/Endocrinology

## 2025-05-02 NOTE — ASSESSMENT & PLAN NOTE
History of falls  Maintain fall and safety precautions  Encourage use of asst devices  Continue PT/OT services with VNA  Assess for need with assistance with transfers, mobility, and ADLs in/out of home  Patient is at high risk for falls r/t ESRD on HD, neuropathy, dyspnea, polypharmacy, environmental barriers, and age  Continue to monitor for acute changes in condition   Follow up with PCP

## 2025-05-02 NOTE — ASSESSMENT & PLAN NOTE
Lab Results   Component Value Date    EGFR 7 04/25/2025    EGFR 8 04/24/2025    EGFR 6 04/23/2025    CREATININE 7.30 (H) 04/25/2025    CREATININE 6.45 (H) 04/24/2025    CREATININE 8.09 (H) 04/23/2025   Continue outpatient HD 3 times weekly  Encourage renal diet compliance and monitor fluid intake  Continue all renal supplements  Follow up with Nephrology

## 2025-05-02 NOTE — ASSESSMENT & PLAN NOTE
Patient with multiple stents, recently 2/27/25 and 3/17/25  Continue Metoprolol, Entresto, Prasugrel, and Eliquis  Follow up with Cardiology

## 2025-05-02 NOTE — ASSESSMENT & PLAN NOTE
Wt Readings from Last 3 Encounters:   05/01/25 94.8 kg (209 lb)   04/26/25 95 kg (209 lb 7 oz)   04/25/25 99.5 kg (219 lb 5.7 oz)   Echo 4/17/25 EF 20%, abnormal diastolic function, AS, AI. Current wt   Continue to monitor weights, 208 lbs  Continue to monitor BMP periodicallly  Continue GDMT Metoprolol 25 mg po QD   Continue NINA diet  Continue to monitor for s/s of fluid overload  Follow up with PCP/Cardiology

## 2025-05-02 NOTE — ASSESSMENT & PLAN NOTE
Patient with life vest in place  Echo 4/17/25 EF 20%, abnormal diastolic function, AS, AI   Continue Metoprolol 25 mg po QD  Follow up with Cardiology

## 2025-05-02 NOTE — ASSESSMENT & PLAN NOTE
BP remains stable today, 114/56  Continue Midodrine 10 mg po prior to dialysis  Encourage patient to rise slowly from seated to standing position  Maintain fall and safety precautions  Continue to monitor BP and for acute changes in condition  Follow up with PCP/Cardiology

## 2025-05-06 ENCOUNTER — APPOINTMENT (OUTPATIENT)
Facility: CLINIC | Age: 65
End: 2025-05-06
Payer: MEDICARE

## 2025-05-06 ENCOUNTER — HOME CARE VISIT (OUTPATIENT)
Dept: HOME HEALTH SERVICES | Facility: HOME HEALTHCARE | Age: 65
End: 2025-05-06
Payer: MEDICARE

## 2025-05-06 ENCOUNTER — HOSPITAL ENCOUNTER (EMERGENCY)
Facility: HOSPITAL | Age: 65
Discharge: HOME | End: 2025-05-06
Attending: EMERGENCY MEDICINE
Payer: MEDICARE

## 2025-05-06 VITALS
SYSTOLIC BLOOD PRESSURE: 106 MMHG | OXYGEN SATURATION: 96 % | TEMPERATURE: 97.8 F | RESPIRATION RATE: 18 BRPM | DIASTOLIC BLOOD PRESSURE: 56 MMHG | HEART RATE: 100 BPM

## 2025-05-06 VITALS — HEART RATE: 80 BPM

## 2025-05-06 VITALS
DIASTOLIC BLOOD PRESSURE: 45 MMHG | SYSTOLIC BLOOD PRESSURE: 116 MMHG | RESPIRATION RATE: 18 BRPM | HEART RATE: 62 BPM | OXYGEN SATURATION: 100 % | TEMPERATURE: 97.7 F

## 2025-05-06 VITALS — HEART RATE: 88 BPM | DIASTOLIC BLOOD PRESSURE: 70 MMHG | SYSTOLIC BLOOD PRESSURE: 105 MMHG

## 2025-05-06 DIAGNOSIS — Z51.89 VISIT FOR WOUND CHECK: Primary | ICD-10-CM

## 2025-05-06 LAB
ABO GROUP BLD: NORMAL
ALBUMIN SERPL BCG-MCNC: 3.9 G/DL (ref 3.5–5)
ALP SERPL-CCNC: 120 U/L (ref 34–104)
ALT SERPL W P-5'-P-CCNC: 7 U/L (ref 7–52)
ANION GAP SERPL CALCULATED.3IONS-SCNC: 11 MMOL/L (ref 4–13)
ANISOCYTOSIS BLD QL SMEAR: PRESENT
AST SERPL W P-5'-P-CCNC: 9 U/L (ref 13–39)
ATRIAL RATE: 66 BPM
BASOPHILS # BLD AUTO: 0.02 THOUSANDS/ÂΜL (ref 0–0.1)
BASOPHILS NFR BLD AUTO: 0 % (ref 0–1)
BILIRUB SERPL-MCNC: 0.74 MG/DL (ref 0.2–1)
BLD GP AB SCN SERPL QL: NEGATIVE
BUN SERPL-MCNC: 27 MG/DL (ref 5–25)
CALCIUM SERPL-MCNC: 8.2 MG/DL (ref 8.4–10.2)
CHLORIDE SERPL-SCNC: 97 MMOL/L (ref 96–108)
CO2 SERPL-SCNC: 29 MMOL/L (ref 21–32)
CREAT SERPL-MCNC: 4.27 MG/DL (ref 0.6–1.3)
EOSINOPHIL # BLD AUTO: 0.11 THOUSAND/ÂΜL (ref 0–0.61)
EOSINOPHIL NFR BLD AUTO: 2 % (ref 0–6)
ERYTHROCYTE [DISTWIDTH] IN BLOOD BY AUTOMATED COUNT: 16.1 % (ref 11.6–15.1)
GFR SERPL CREATININE-BSD FRML MDRD: 13 ML/MIN/1.73SQ M
GLUCOSE SERPL-MCNC: 99 MG/DL (ref 65–140)
HCT VFR BLD AUTO: 32.7 % (ref 36.5–49.3)
HGB BLD-MCNC: 10 G/DL (ref 12–17)
IMM GRANULOCYTES # BLD AUTO: 0.02 THOUSAND/UL (ref 0–0.2)
IMM GRANULOCYTES NFR BLD AUTO: 0 % (ref 0–2)
LYMPHOCYTES # BLD AUTO: 0.65 THOUSANDS/ÂΜL (ref 0.6–4.47)
LYMPHOCYTES NFR BLD AUTO: 14 % (ref 14–44)
MCH RBC QN AUTO: 30.7 PG (ref 26.8–34.3)
MCHC RBC AUTO-ENTMCNC: 30.6 G/DL (ref 31.4–37.4)
MCV RBC AUTO: 100 FL (ref 82–98)
MONOCYTES # BLD AUTO: 0.61 THOUSAND/ÂΜL (ref 0.17–1.22)
MONOCYTES NFR BLD AUTO: 13 % (ref 4–12)
NEUTROPHILS # BLD AUTO: 3.35 THOUSANDS/ÂΜL (ref 1.85–7.62)
NEUTS SEG NFR BLD AUTO: 71 % (ref 43–75)
NRBC BLD AUTO-RTO: 0 /100 WBCS
P AXIS: 51 DEGREES
PLATELET # BLD AUTO: 97 THOUSANDS/UL (ref 149–390)
PLATELET BLD QL SMEAR: ABNORMAL
PMV BLD AUTO: 11.1 FL (ref 8.9–12.7)
POTASSIUM SERPL-SCNC: 4.3 MMOL/L (ref 3.5–5.3)
PR INTERVAL: 184 MS
PROT SERPL-MCNC: 6.7 G/DL (ref 6.4–8.4)
QRS AXIS: -73 DEGREES
QRSD INTERVAL: 178 MS
QT INTERVAL: 514 MS
QTC INTERVAL: 538 MS
RBC # BLD AUTO: 3.26 MILLION/UL (ref 3.88–5.62)
RBC MORPH BLD: PRESENT
RH BLD: POSITIVE
SODIUM SERPL-SCNC: 137 MMOL/L (ref 135–147)
SPECIMEN EXPIRATION DATE: NORMAL
T WAVE AXIS: 61 DEGREES
VENTRICULAR RATE: 66 BPM
WBC # BLD AUTO: 4.76 THOUSAND/UL (ref 4.31–10.16)

## 2025-05-06 PROCEDURE — 86850 RBC ANTIBODY SCREEN: CPT

## 2025-05-06 PROCEDURE — G0300 HHS/HOSPICE OF LPN EA 15 MIN: HCPCS

## 2025-05-06 PROCEDURE — 36415 COLL VENOUS BLD VENIPUNCTURE: CPT

## 2025-05-06 PROCEDURE — 93010 ELECTROCARDIOGRAM REPORT: CPT | Performed by: STUDENT IN AN ORGANIZED HEALTH CARE EDUCATION/TRAINING PROGRAM

## 2025-05-06 PROCEDURE — G0152 HHCP-SERV OF OT,EA 15 MIN: HCPCS

## 2025-05-06 PROCEDURE — 99284 EMERGENCY DEPT VISIT MOD MDM: CPT | Performed by: EMERGENCY MEDICINE

## 2025-05-06 PROCEDURE — 85025 COMPLETE CBC W/AUTO DIFF WBC: CPT

## 2025-05-06 PROCEDURE — G0151 HHCP-SERV OF PT,EA 15 MIN: HCPCS

## 2025-05-06 PROCEDURE — 93005 ELECTROCARDIOGRAM TRACING: CPT

## 2025-05-06 PROCEDURE — 86901 BLOOD TYPING SEROLOGIC RH(D): CPT

## 2025-05-06 PROCEDURE — 99283 EMERGENCY DEPT VISIT LOW MDM: CPT

## 2025-05-06 PROCEDURE — 86900 BLOOD TYPING SEROLOGIC ABO: CPT

## 2025-05-06 PROCEDURE — 80053 COMPREHEN METABOLIC PANEL: CPT

## 2025-05-06 NOTE — Clinical Note
Eliana accompanied Asad Galloway to the emergency department on 5/6/2025.    Return date if applicable: 05/07/2025        If you have any questions or concerns, please don't hesitate to call.      Melanie Carnes MD

## 2025-05-06 NOTE — Clinical Note
accompanied Asad Galloway to the emergency department on 5/6/2025.    Return date if applicable: 05/07/2025        If you have any questions or concerns, please don't hesitate to call.      Melanie Carnes MD

## 2025-05-06 NOTE — ED PROVIDER NOTES
Time reflects when diagnosis was documented in both MDM as applicable and the Disposition within this note       Time User Action Codes Description Comment    5/6/2025  7:39 AM Melanie Carnes Add [Z51.89] Visit for wound check           ED Disposition       ED Disposition   Discharge    Condition   Stable    Date/Time   Tue May 6, 2025  7:39 AM    Comment   Asad Galloway discharge to home/self care.                   Assessment & Plan       Medical Decision Making  Amount and/or Complexity of Data Reviewed  Labs: ordered. Decision-making details documented in ED Course.      See ED course for MDM.    ED Course as of 05/06/25 0746   Tue May 06, 2025   0534 Previous images on 4/24 reviewed by myself.   0540 DDx including but not limited to: Wound check, wound dehiscence, cellulitis, retained foreign bleeding   0708 Potassium: 4.3  WNL   0709 Creatinine(!): 4.27  ESRD on HD   0723 Called lab, Hgb 10.0, Hct 32.7   0728 Hemoglobin stable from prior, no transfusion needed.  Reevaluated patient, continues to deny any pain.  Dressing continues to be clean dry and intact, no further signs of bleeding.  Instructed patient to not touch wound bandage, and to follow-up with his primary care doctor for further management.  Return precautions discussed at length.  Patient voiced understanding agrees with plan.  All questions and concerns addressed at this time.       Medications - No data to display    ED Risk Strat Scores                    No data recorded                            History of Present Illness       Chief Complaint   Patient presents with    Wound Check     Pt reports receiving dialysis treatment yesterday at 1700. Pt removed tape earlier then he is supposed to and has been bleeding from site all night long. Wife states there was a lot of blood in the bed. +dizziness.  +thinners.        Past Medical History:   Diagnosis Date    Cerebrovascular accident (CVA) due to thrombosis of left middle cerebral artery (HCC)  07/29/2018    Chronic kidney disease     Coronary artery disease     Diabetes mellitus (HCC)     not on meds    Dialysis patient ([facility])     M-W-F    Fistula     left upper arm for hemodialyis    GERD (gastroesophageal reflux disease)     History of coronary artery stent placement     x3    Hypercholesteremia     Hyperlipidemia     Hypertension     Infectious viral hepatitis     B as child    Limb alert care status     no BP/IV left arm    Neuropathy     Nonhealing ulcer of heel ([facility]) 04/25/2023    Obesity     Osteomyelitis ([facility])     last assessed 11/4/16-per son not currently    Penetrating foot wound, left, initial encounter 01/19/2022    PVC's (premature ventricular contractions)     sees  Cardio    Stroke ([facility])     last weeof July 2018 [facility]    TIA (transient ischemic attack) 10/28/2018    Ulcer of left heel, limited to breakdown of skin ([facility]) 06/13/2024    Wears dentures     Wears glasses       Past Surgical History:   Procedure Laterality Date    ABDOMINAL SURGERY      CARDIAC CATHETERIZATION N/A 05/02/2022    Procedure: Cardiac Coronary Angiogram;  Surgeon: Sam Davis MD;  [facility];  Service: Cardiology    CARDIAC CATHETERIZATION N/A 05/02/2022    Procedure: Cardiac pci;  Surgeon: Sam Davis MD;  [facility];  Service: Cardiology    CARDIAC CATHETERIZATION  02/01/2023    Procedure: Cardiac catheterization;  Surgeon: Sam Davis MD;  [facility];  Service: Cardiology    CARDIAC CATHETERIZATION N/A 02/01/2023    Procedure: Cardiac pci;  Surgeon: Sam Davis MD;  [facility];  Service: Cardiology    CARDIAC CATHETERIZATION N/A 02/01/2023    Procedure: Cardiac Coronary Angiogram;  Surgeon: Sam Davis MD;  [facility];  Service: Cardiology    CARDIAC CATHETERIZATION N/A 02/01/2023    Procedure: Cardiac other-IVUS;  Surgeon: Sam Davis MD;  [facility];  Service: Cardiology     "CHOLECYSTECTOMY      Percutaneous    COLONOSCOPY      CORONARY ANGIOPLASTY WITH STENT PLACEMENT      CYSTOSCOPY      HEMODIALYSIS ADULT  1/22/2024    HEMODIALYSIS ADULT  01/24/2024    IR LOWER EXTREMITY ANGIOGRAM  9/29/2023    IR LOWER EXTREMITY ANGIOGRAM  02/26/2024    OTHER SURGICAL HISTORY      \"stimulator to control bowel movements\"    LA ESOPHAGOGASTRODUODENOSCOPY TRANSORAL DIAGNOSTIC N/A 09/27/2016    Procedure: ESOPHAGOGASTRODUODENOSCOPY (EGD);  Surgeon: Adele Rowe MD;  Location: [facility] GI [facility];  Service: Gastroenterology    LA LAPAROSCOPY SURG CHOLECYSTECTOMY N/A 02/29/2016    Procedure: LAPAROSCOPIC CHOLECYSTECTOMY ;  Surgeon: Cliff Roman DO;  Location: [facility];  Service: General    LA SLCTV CATHJ 3RD+ ORD SLCTV ABDL PEL/LXTR BRNCH Left 9/29/2023    Procedure: Left leg angiogram with intervention;  Surgeon: Michelle Galvan MD;  Location: [facility];  Service: Vascular    LA SLCTV CATHJ 3RD+ ORD SLCTV ABDL PEL/LXTR BRNCH Left 02/26/2024    Procedure: Left leg angiogram antegrade access, left popliteal angioplasty;  Surgeon: Michelle Galvan MD;  Location: [facility];  Service: Vascular    ROTATOR CUFF REPAIR Right     SPINAL CORD STIMULATOR IMPLANT      \"Medtronic interstim model # 3058- in lower back to control bowel movements-currently turned off-battery is dead\"    TOE AMPUTATION Right 10/28/2016    Procedure: 3RD TOE AMPUTATION ;  Surgeon: Anjel Salas DPM;  Location: [facility];  Service:       Family History   Problem Relation Age of Onset    Leukemia Mother     Liver disease Mother     Lung cancer Mother         heavy smoker - 3 ppd    Heart disease Father     Liver disease Father     Diabetes type I Father     Multiple myeloma Sister     Breast cancer Sister     Urolithiasis Family     Alcohol abuse Neg Hx     Depression Neg Hx     Drug abuse Neg Hx     Substance Abuse Neg Hx     Mental illness Neg Hx       Social History     Tobacco Use    Smoking status: Never    Smokeless " tobacco: Never   Vaping Use    Vaping status: Never Used   Substance Use Topics    Alcohol use: Never    Drug use: No      E-Cigarette/Vaping    E-Cigarette Use Never User       E-Cigarette/Vaping Substances    Nicotine No     THC No     CBD No     Flavoring No     Other No     Unknown No       I have reviewed and agree with the history as documented.     HPI  Pt is a 66 yo M with history of ESRD on hemodialysis Monday Wednesday Friday, presenting for wound recheck.  Patient went to dialysis yesterday, received full treatment.  Upon finishing treatment, patient's fistula was bandage, and he was instructed not to remove the bandage.  However, patient removed the bandage upon going home, with subsequent bleeding throughout the day.  Prior to presentation, wife reports a dressing over it with improvement of bleeding.  Patient denies chest pain, shortness of breath, lightheadedness, LOC, or any other symptoms at this time.    Review of Systems  As per [facility]      Objective       ED Triage Vitals   Temperature Pulse Blood Pressure Respirations SpO2 Patient Position - Orthostatic VS   05/06/25 0536 05/06/25 0541 05/06/25 0536 05/06/25 0536 05/06/25 0541 05/06/25 0536   97.7 °F (36.5 °C) 62 (!) 116/45 18 100 % Sitting      Temp src Heart Rate Source BP Location FiO2 (%) Pain Score    -- 05/06/25 0541 05/06/25 0536 -- 05/06/25 0536     Monitor Right arm  No Pain      Vitals      Date and Time Temp Pulse SpO2 Resp BP Pain Score FACES Pain Rating User   05/06/25 0541 -- 62 100 % -- -- -- -- JY   05/06/25 0536 97.7 °F (36.5 °C) -- -- 18 116/45 No Pain -- JY            Physical Exam  Vitals and nursing note reviewed.   Constitutional:       General: He is not in acute distress.     Appearance: Normal appearance. He is ill-appearing (chronic). He is not toxic-appearing or diaphoretic.   HENT:      Head: Normocephalic and atraumatic.      Nose: Nose normal.   Eyes:      Extraocular Movements: Extraocular movements intact.       Conjunctiva/sclera: Conjunctivae normal.   Cardiovascular:      Rate and Rhythm: Normal rate and regular rhythm.      Pulses:           Radial pulses are 1+ on the right side and 1+ on the left side.      Heart sounds: No murmur heard.     No friction rub. No gallop.   Pulmonary:      Effort: Pulmonary effort is normal. No respiratory distress.      Breath sounds: Normal breath sounds.   Abdominal:      General: Bowel sounds are normal.      Palpations: Abdomen is soft.      Tenderness: There is no abdominal tenderness. There is no guarding or rebound.   Musculoskeletal:         General: No swelling or tenderness. Normal range of motion.      Cervical back: Normal range of motion. No rigidity or tenderness.   Skin:     General: Skin is warm and dry.      Capillary Refill: Capillary refill takes less than 2 seconds.      Comments: Bandage to left fistula in the left upper arm.  Dressing is clean dry and intact, no active bleeding or drainage.  Palpable thrill of fistula.   Neurological:      Mental Status: He is alert and oriented to person, place, and time.      Cranial Nerves: No cranial nerve deficit.      Sensory: No sensory deficit.      Motor: No weakness.         Results Reviewed       Procedure Component Value Units Date/Time    CBC and differential [[id number]]  (Abnormal) Collected: 05/06/25 0607    Lab Status: Final result Specimen: Blood from Arm, Right Updated: 05/06/25 0726     WBC 4.76 Thousand/uL      RBC 3.26 Million/uL      Hemoglobin 10.0 g/dL      Hematocrit 32.7 %       fL      MCH 30.7 pg      MCHC 30.6 g/dL      RDW 16.1 %      MPV 11.1 fL      Platelets 97 Thousands/uL      nRBC 0 /100 WBCs      Segmented % 71 %      Immature Grans % 0 %      Lymphocytes % 14 %      Monocytes % 13 %      Eosinophils Relative 2 %      Basophils Relative 0 %      Absolute Neutrophils 3.35 Thousands/µL      Absolute Immature Grans 0.02 Thousand/uL      Absolute Lymphocytes 0.65 Thousands/µL      Absolute  Monocytes 0.61 Thousand/µL      Eosinophils Absolute 0.11 Thousand/µL      Basophils Absolute 0.02 Thousands/µL     Narrative:      This is an appended report.  These results have been appended to a previously verified report.    Smear Review(Phlebs Do Not Order) [[id number]]  (Abnormal) Collected: 05/06/25 0607    Lab Status: Final result Specimen: Blood from Arm, Right Updated: 05/06/25 0726     RBC Morphology Present     Platelet Estimate Borderline     Anisocytosis Present    Comprehensive metabolic panel [[id number]]  (Abnormal) Collected: 05/06/25 0607    Lab Status: Final result Specimen: Blood from Arm, Right Updated: 05/06/25 0702     Sodium 137 mmol/L      Potassium 4.3 mmol/L      Chloride 97 mmol/L      CO2 29 mmol/L      ANION GAP 11 mmol/L      BUN 27 mg/dL      Creatinine 4.27 mg/dL      Glucose 99 mg/dL      Calcium 8.2 mg/dL      AST 9 U/L      ALT 7 U/L      Alkaline Phosphatase 120 U/L      Total Protein 6.7 g/dL      Albumin 3.9 g/dL      Total Bilirubin 0.74 mg/dL      eGFR 13 ml/min/1.73sq m     Narrative:      National Kid[employer] Disease Foundation guidelines for Chronic Kidney Disease (CKD):     Stage 1 with normal or high GFR (GFR > 90 mL/min/1.73 square meters)    Stage 2 Mild CKD (GFR = 60-89 mL/min/1.73 square meters)    Stage 3A Moderate CKD (GFR = 45-59 mL/min/1.73 square meters)    Stage 3B Moderate CKD (GFR = 30-44 mL/min/1.73 square meters)    Stage 4 Severe CKD (GFR = 15-29 mL/min/1.73 square meters)    Stage 5 End Stage CKD (GFR <15 mL/min/1.73 square meters)  Note: GFR calculation is accurate only with a steady state creatinine            No orders to display       ECG 12 Lead Documentation Only    Date/Time: 5/6/2025 6:23 AM    Performed by: Melanie Carnes MD  Authorized by: Melanie Carnes MD    ECG reviewed by me, the ED Provider: yes    Patient location:  ED  Previous ECG:     Previous ECG:  Compared to current    Comparison ECG info:  3/21/2025    Similarity:  No change    Comparison to  cardiac monitor: No    Rate:     ECG rate:  66    ECG rate assessment: normal    Rhythm:     Rhythm: sinus rhythm    Ectopy:     Ectopy: none    QRS:     QRS axis: Borderline right axis.    QRS intervals:  Wide  Conduction:     Conduction: abnormal      Abnormal conduction: complete RBBB    ST segments:     ST segments:  Abnormal    Depression:  V3, V4, V5, V6, II, III and aVF (Unchanged from previous EKG on 3/21/2025)  T waves:     T waves: non-specific    Comments:      Unchanged from previous EKG on 3/21/2025.      ED Medication and Procedure Management   Prior to Admission Medications   Prescriptions Last Dose Informant Patient Reported? Taking?   Sevelamer Carbonate 2.4 g  Family Member Yes No   Sig: Take 1 packet by mouth Three times a day   Vitamin D3 1.25 MG (50262 UT) capsule  Family Member No No   Sig: TAKE 1 CAPSULE BY MOUTH ONE TIME PER WEEK   apixaban (Eliquis) 2.5 mg  Family Member No No   Sig: Take 1 tablet (2.5 mg total) by mouth 2 (two) times a day   aspirin 81 mg chewable tablet  Family Member Yes No   Sig: Chew 1 tablet daily   atorvastatin (LIPITOR) 40 mg tablet  Family Member No No   Sig: Take 1 tablet (40 mg total) by mouth daily with dinner   metoprolol succinate (TOPROL-XL) 25 mg 24 hr tablet  Family Member No No   Sig: TAKE 1 TABLET BY MOUTH TWICE A DAY   midodrine (PROAMATINE) 10 MG tablet  Family Member No No   Sig: Take 1 tablet (10 mg total) by mouth Before Dialysis (before dialysis)   prasugrel (EFFIENT) tablet  Family Member No No   Sig: Take 1 tablet (10 mg total) by mouth daily   sacubitril-valsartan (ENTRESTO) 24-26 MG TABS  Family Member Yes No   Sig: Take 1 tablet by mouth daily after dinner      Facility-Administered Medications: None     Patient's Medications   Discharge Prescriptions    No medications on file     No discharge procedures on file.  ED SEPSIS DOCUMENTATION   Time reflects when diagnosis was documented in both MDM as applicable and the Disposition within this note        Time User Action Codes Description Comment    5/6/2025  7:39 AM Melanie Carnes Add [Z51.89] Visit for wound check                  Melanie Carnes MD  05/06/25 0746

## 2025-05-06 NOTE — ED ATTENDING ATTESTATION
5/6/2025  I, Awais Machado MD, saw and evaluated the patient. I have discussed the patient with the resident/non-physician practitioner and agree with the resident's/non-physician practitioner's findings, Plan of Care, and MDM as documented in the resident's/non-physician practitioner's note, except where noted. All available labs and Radiology studies were reviewed.  I was present for key portions of any procedure(s) performed by the resident/non-physician practitioner and I was immediately available to provide assistance.       At this point I agree with the current assessment done in the Emergency Department.  I have conducted an independent evaluation of this patient a history and physical is as follows: Patient is a 65 year old male who had hemodialysis yesterday and took off his dressing from his AV fistula site yesterday and has had bleeding since. No trauma. Patient is on eliquis. Dressing placed by wife. (+) dizziness. No fever. Was last seen at  Cardiology in [place] on 5/1/25 for chronic CHF. Last  Tdap was in 2022. [facility] website checked on this patient and last Rx filled was on 12/30/23 for ativan for 25 day supply. NCAT. No scleral icterus. Moist mucous membranes. Lungs clear. Heart regular. Abdomen soft and nontender. Good bowel sounds.  (+) dressing in place with no active bleeding noted L UE. (+) thrill. No edema. No rash noted. No significant pallor noted. DDx including but not limited to:  AV fistula bleeding, anemia, coagulopathy, thrombocytopenia, bleeding diathesis, metabolic abnormality; doubt wound dehiscence, infection or ACS or MI. Will check labs and EKG.     ED Course         Critical Care Time  Procedures

## 2025-05-07 ENCOUNTER — APPOINTMENT (OUTPATIENT)
Dept: LAB | Facility: CLINIC | Age: 65
End: 2025-05-07
Payer: MEDICARE

## 2025-05-07 ENCOUNTER — VBI (OUTPATIENT)
Dept: FAMILY MEDICINE CLINIC | Facility: CLINIC | Age: 65
End: 2025-05-07

## 2025-05-07 DIAGNOSIS — Z86.718 HISTORY OF DVT (DEEP VEIN THROMBOSIS): ICD-10-CM

## 2025-05-07 LAB
BASOPHILS # BLD AUTO: 0.02 THOUSANDS/ÂΜL (ref 0–0.1)
BASOPHILS NFR BLD AUTO: 0 % (ref 0–1)
EOSINOPHIL # BLD AUTO: 0.16 THOUSAND/ÂΜL (ref 0–0.61)
EOSINOPHIL NFR BLD AUTO: 3 % (ref 0–6)
ERYTHROCYTE [DISTWIDTH] IN BLOOD BY AUTOMATED COUNT: 16.2 % (ref 11.6–15.1)
HCT VFR BLD AUTO: 32.9 % (ref 36.5–49.3)
HGB BLD-MCNC: 10 G/DL (ref 12–17)
IMM GRANULOCYTES # BLD AUTO: 0.03 THOUSAND/UL (ref 0–0.2)
IMM GRANULOCYTES NFR BLD AUTO: 1 % (ref 0–2)
LYMPHOCYTES # BLD AUTO: 0.91 THOUSANDS/ÂΜL (ref 0.6–4.47)
LYMPHOCYTES NFR BLD AUTO: 19 % (ref 14–44)
MCH RBC QN AUTO: 31 PG (ref 26.8–34.3)
MCHC RBC AUTO-ENTMCNC: 30.4 G/DL (ref 31.4–37.4)
MCV RBC AUTO: 102 FL (ref 82–98)
MONOCYTES # BLD AUTO: 0.54 THOUSAND/ÂΜL (ref 0.17–1.22)
MONOCYTES NFR BLD AUTO: 11 % (ref 4–12)
NEUTROPHILS # BLD AUTO: 3.07 THOUSANDS/ÂΜL (ref 1.85–7.62)
NEUTS SEG NFR BLD AUTO: 66 % (ref 43–75)
NRBC BLD AUTO-RTO: 0 /100 WBCS
PLATELET # BLD AUTO: 95 THOUSANDS/UL (ref 149–390)
PMV BLD AUTO: 11.7 FL (ref 8.9–12.7)
RBC # BLD AUTO: 3.23 MILLION/UL (ref 3.88–5.62)
WBC # BLD AUTO: 4.73 THOUSAND/UL (ref 4.31–10.16)

## 2025-05-07 PROCEDURE — 85025 COMPLETE CBC W/AUTO DIFF WBC: CPT

## 2025-05-07 PROCEDURE — 36415 COLL VENOUS BLD VENIPUNCTURE: CPT

## 2025-05-07 NOTE — TELEPHONE ENCOUNTER
05/07/25 2:58 PM    Patient contacted post ED visit, [facility] department spoke with patient/caregiver and outreach was successful.    Thank you.  Edward Floyd MA  [facility]

## 2025-05-08 ENCOUNTER — APPOINTMENT (OUTPATIENT)
Facility: CLINIC | Age: 65
End: 2025-05-08
Payer: MEDICARE

## 2025-05-08 ENCOUNTER — HOME CARE VISIT (OUTPATIENT)
Dept: HOME HEALTH SERVICES | Facility: HOME HEALTHCARE | Age: 65
End: 2025-05-08
Payer: MEDICARE

## 2025-05-08 ENCOUNTER — TELEMEDICINE (OUTPATIENT)
Dept: GERIATRICS | Facility: CLINIC | Age: 65
End: 2025-05-08
Payer: MEDICARE

## 2025-05-08 VITALS
RESPIRATION RATE: 16 BRPM | HEART RATE: 65 BPM | DIASTOLIC BLOOD PRESSURE: 58 MMHG | OXYGEN SATURATION: 98 % | TEMPERATURE: 97 F | SYSTOLIC BLOOD PRESSURE: 94 MMHG

## 2025-05-08 VITALS — DIASTOLIC BLOOD PRESSURE: 53 MMHG | SYSTOLIC BLOOD PRESSURE: 95 MMHG

## 2025-05-08 DIAGNOSIS — E11.42 DIABETIC POLYNEUROPATHY ASSOCIATED WITH TYPE 2 DIABETES MELLITUS (HCC): ICD-10-CM

## 2025-05-08 DIAGNOSIS — I82.451 ACUTE EMBOLISM AND THROMBOSIS OF RIGHT PERONEAL VEIN (HCC): ICD-10-CM

## 2025-05-08 DIAGNOSIS — I25.10 CAD, MULTIPLE VESSEL: ICD-10-CM

## 2025-05-08 DIAGNOSIS — D63.1 ANEMIA DUE TO CHRONIC KIDNEY DISEASE, ON CHRONIC DIALYSIS (HCC): ICD-10-CM

## 2025-05-08 DIAGNOSIS — I25.5 ISCHEMIC CARDIOMYOPATHY: ICD-10-CM

## 2025-05-08 DIAGNOSIS — Z99.2 ESRD ON DIALYSIS (HCC): Primary | ICD-10-CM

## 2025-05-08 DIAGNOSIS — N18.6 ESRD ON DIALYSIS (HCC): Primary | ICD-10-CM

## 2025-05-08 DIAGNOSIS — N18.6 ANEMIA DUE TO CHRONIC KIDNEY DISEASE, ON CHRONIC DIALYSIS (HCC): ICD-10-CM

## 2025-05-08 DIAGNOSIS — Z99.2 ANEMIA DUE TO CHRONIC KIDNEY DISEASE, ON CHRONIC DIALYSIS (HCC): ICD-10-CM

## 2025-05-08 DIAGNOSIS — I95.9 HYPOTENSION, UNSPECIFIED HYPOTENSION TYPE: ICD-10-CM

## 2025-05-08 DIAGNOSIS — I50.20 HFREF (HEART FAILURE WITH REDUCED EJECTION FRACTION) (HCC): ICD-10-CM

## 2025-05-08 DIAGNOSIS — R26.2 AMBULATORY DYSFUNCTION: ICD-10-CM

## 2025-05-08 PROCEDURE — G0151 HHCP-SERV OF PT,EA 15 MIN: HCPCS

## 2025-05-08 PROCEDURE — 99215 OFFICE O/P EST HI 40 MIN: CPT | Performed by: NURSE PRACTITIONER

## 2025-05-08 PROCEDURE — G0299 HHS/HOSPICE OF RN EA 15 MIN: HCPCS

## 2025-05-08 NOTE — ASSESSMENT & PLAN NOTE
Wt Readings from Last 3 Encounters:   05/02/25 94.3 kg (208 lb)   05/01/25 94.8 kg (209 lb)   04/26/25 95 kg (209 lb 7 oz)   Echo 4/17/25 EF 20%, abnormal diastolic function, AS, AI.   Current wt 216 lbs  Continue to monitor weights  Continue to monitor BMP periodicallly  Continue GDMT Metoprolol 25 mg po QD   Continue NINA diet  Continue to monitor for s/s of fluid overload  Follow up with PCP/Cardiology

## 2025-05-08 NOTE — ASSESSMENT & PLAN NOTE
BP remains stable today, 94/58  Continue Midodrine 10 mg po prior to dialysis  Encourage patient to rise slowly from seated to standing position  Maintain fall and safety precautions  Continue to monitor BP and for acute changes in condition  Follow up with PCP/Cardiology

## 2025-05-08 NOTE — ASSESSMENT & PLAN NOTE
Lab Results   Component Value Date    HGBA1C 5.4 04/10/2025   Bgs well controlled, 100 today  Patient is currently diet controlled  Continue intermittent Accu-cheks   Educate patient on s/s of hyper/hypoglycemia  Continue to monitor for acute changes in condition   Follow up with PCP/Endocrinology

## 2025-05-08 NOTE — PROGRESS NOTES
Virtual Regular VisitName: Asad Galloway      : 1960      MRN: [id number]  Encounter Provider: BRITTANY Mirza  Encounter Date: 2025   Encounter department: [facility] VIRTUAL  :  Assessment & Plan  ESRD on dialysis ([facility])  Lab Results   Component Value Date    EGFR 13 2025    EGFR 7 2025    EGFR 8 2025    CREATININE 4.27 (H) 2025    CREATININE 7.30 (H) 2025    CREATININE 6.45 (H) 2025   Continue outpatient HD 3 times weekly  Encourage renal diet compliance and monitor fluid intake  Continue all renal supplements  Follow up with Nephrology       HFrEF (heart failure with reduced ejection fraction) ([facility])  Wt Readings from Last 3 Encounters:   25 94.3 kg (208 lb)   25 94.8 kg (209 lb)   25 95 kg (209 lb 7 oz)   Echo 25 EF 20%, abnormal diastolic function, AS, AI.   Current wt 216 lbs  Continue to monitor weights  Continue to monitor BMP periodicallly  Continue GDMT Metoprolol 25 mg po QD   Continue NINA diet  Continue to monitor for s/s of fluid overload  Follow up with PCP/Cardiology    CAD, multiple vessel  Patient with multiple stents, recently 25 and 3/17/25  Continue Metoprolol, Entresto, Prasugrel, and Eliquis  Follow up with Cardiology      Ischemic cardiomyopathy  Patient with life vest in place  Echo 25 EF 20%, abnormal diastolic function, AS, AI   Continue Metoprolol 25 mg po QD  Follow up with Cardiology      Acute embolism and thrombosis of right peroneal vein ([facility])  History of DVT right UE  Continue Eliquis and Prasugrel  Follow up with vascular prn      Hypotension, unspecified hypotension type  BP remains stable today, 94/58  Continue Midodrine 10 mg po prior to dialysis  Encourage patient to rise slowly from seated to standing position  Maintain fall and safety precautions  Continue to monitor BP and for acute changes in condition  Follow up with PCP/Cardiology        Diabetic polyneuropathy  associated with type 2 diabetes mellitus (HCC)  Lab Results   Component Value Date    HGBA1C 5.4 04/10/2025   Bgs well controlled, 100 today  Patient is currently diet controlled  Continue intermittent Accu-cheks   Educate patient on s/s of hyper/hypoglycemia  Continue to monitor for acute changes in condition   Follow up with PCP/Endocrinology       Ambulatory dysfunction  History of falls  Maintain fall and safety precautions  Encourage use of asst devices  Continue PT/OT services with VNA  Assess for need with assistance with transfers, mobility, and ADLs in/out of home  Patient is at high risk for falls r/t ESRD on HD, neuropathy, dyspnea, polypharmacy, environmental barriers, and age  Continue to monitor for acute changes in condition   Follow up with PCP       Anemia due to chronic kidney disease, on chronic dialysis (HCC)  Most recent Hgb 10.0/Hct 32.9  Continue to monitor CBC periodically  Monitor for signs and symptoms of bleeding  Continue to monitor patient for acute changes in condition  Follow up with PCP           History of Present Illness        Asad Galloway is a 65 y.o. male patient, being seen for Heal at home program in conjuction with VNA nurse Stefani Guidry, who was in the home to perform physical assessment.      The patient is being seen and examined today for follow-up on acute and chronic medical conditions s/p most recent hospitalization.     The patient reports he is feeling good today. He reports he had to go to ED on 5/6/25 due to bleeding at his fistula site. This has since resolved. Patient reports weight is around 216 lbs. I encouraged him to keep a daily log of weights at home. He denies any issues with life vest and reports he is wearing it daily as instructed. Bp remains labile. Patient denies any increase in dyspnea, CP, dizziness, HA, fever, chills, nausea, vomiting, diarrhea, or constipation. Patient has f/u 5/15 with Vascular and 5/20 Podiatry.           Review of Systems    Constitutional:  Positive for activity change and fatigue. Negative for chills and fever.   HENT:  Negative for ear pain and sore throat.    Eyes:  Negative for pain and visual disturbance.   Respiratory:  Positive for shortness of breath. Negative for cough.    Cardiovascular:  Negative for chest pain and palpitations.   Gastrointestinal:  Negative for abdominal pain and vomiting.   Genitourinary:  Negative for dysuria and hematuria.   Musculoskeletal:  Positive for arthralgias and gait problem. Negative for back pain.   Skin:  Negative for color change and rash.   Neurological:  Positive for weakness. Negative for seizures and syncope.   All other systems reviewed and are negative.    Pertinent Medical History   Patient presented to ED 4/17 after fall in parking lot suspected to be related to low BP. Trauma workup negative except for right hip hematoma. Admitted to ICU given shock, received 2 units whole blood and placed on levophed. Became hypoxic requiring intubation. CXR concerning for CHF exacerbation. Echo showed new drop in EF to 20-25%. Cardiology was consulted. Extubated to bipap and weaned to NC then RA. Infectious w/u negative. Nephrology consulted for dialysis. Had low Bps, given albumin, midodrine. Metoprolol added back on. Discontinued entresto per cardiology. Nephrology increased midodrine to 7.5 mg tid and 10 mg before dialysis. Sensipar held off by nephro given low calcium. Vitamin B12 was low and supplement added. PT/OT saw patient and rec level 1. Patient denied for ARC. Family did not want STR. Patient having some upper airway irritation likely from post intubation- was dc with medrol dose hood, cleared by cardiology. Patient was given Lifevest. Patient was stable for dc and discharged home.     Objective   Vitals    Vitals 5/8/2025 10:46 AM   BP 94/58   Resp 16   SpO2 98 %   Pulse 65   Temp (!) 97 °F (36.1 °C)   Stated Weight    98 kg (216 lb)      5/7/2025  5:43 PM    Component Value Flag Ref  Range Units Status   WBC 4.73  4.31 - 10.16 Thousand/uL Final   RBC 3.23  Low  3.88 - 5.62 Million/uL Final   Hemoglobin 10.0  Low  12.0 - 17.0 g/dL Final   Hematocrit 32.9  Low  36.5 - 49.3 % Final     High  82 - 98 fL Final   MCH 31.0  26.8 - 34.3 pg Final   MCHC 30.4  Low  31.4 - 37.4 g/dL Final   RDW 16.2  High  11.6 - 15.1 % Final   MPV 11.7  8.9 - 12.7 fL Final   Platelets 95  Low  149 - 390 Thousands/uL Final   Comment:   PLT REV 05/06/25   nRBC 0   /100 WBCs Final   Segmented % 66  43 - 75 % Final   Immature Grans % 1  0 - 2 % Final   Lymphocytes % 19  14 - 44 % Final   Monocytes % 11  4 - 12 % Final   Eosinophils Relative 3  0 - 6 % Final   Basophils Relative 0  0 - 1 % Final   Absolute Neutrophils 3.07  1.85 - 7.62 Thousands/µL Final   Absolute Immature Grans 0.03  0.00 - 0.20 Thousand/uL Final   Absolute Lymphocytes 0.91  0.60 - 4.47 Thousands/µL Final   Absolute Monocytes 0.54  0.17 - 1.22 Thousand/µL Final   Eosinophils Absolute 0.16  0.00 - 0.61 Thousand/µL Final   Basophils Absolute 0.02  0.00 - 0.10 Thousands/µL Final       Physical Exam  Vitals and nursing note reviewed.   Constitutional:       General: He is not in acute distress.     Appearance: He is well-developed.   HENT:      Head: Normocephalic and atraumatic.     Eyes:      Conjunctiva/sclera: Conjunctivae normal.      Comments: Wears glasses     Cardiovascular:      Rate and Rhythm: Normal rate and regular rhythm.      Heart sounds: No murmur heard.  Pulmonary:      Effort: Pulmonary effort is normal. No respiratory distress.      Breath sounds: Normal breath sounds.   Abdominal:      Palpations: Abdomen is soft.      Tenderness: There is no abdominal tenderness.     Musculoskeletal:         General: No swelling.      Cervical back: Neck supple.     Skin:     General: Skin is warm and dry.      Capillary Refill: Capillary refill takes less than 2 seconds.     Neurological:      Mental Status: He is alert.      Motor: Weakness  present.      Coordination: Coordination abnormal.      Gait: Gait abnormal.     Psychiatric:         Mood and Affect: Mood normal.         Administrative Statements   Encounter provider BRITTANY Mirza    The Patient is located at Home and in the following state in which I hold an active license [place].    The patient was identified by name and date of birth. Asad Galloway was informed that this is a telemedicine visit and that the visit is being conducted through the [employer] Teams platform. He agrees to proceed..  My office door was closed. No one else was in the room.  He acknowledged consent and understanding of privacy and security of the video platform. The patient has agreed to participate and understands they can discontinue the visit at any time.    I have spent a total time of 43 minutes in caring for this patient on the day of the visit/encounter including Diagnostic results, Prognosis, Risks and benefits of tx options, Instructions for management, Patient and family education, Importance of tx compliance, Risk factor reductions, Impressions, Counseling / Coordination of care, Documenting in the medical record, Reviewing/placing orders in the medical record (including tests, medications, and/or procedures), Obtaining or reviewing history  , and Communicating with other healthcare professionals , not including the time spent for establishing the audio/video connection.

## 2025-05-08 NOTE — ASSESSMENT & PLAN NOTE
Lab Results   Component Value Date    EGFR 13 05/06/2025    EGFR 7 04/25/2025    EGFR 8 04/24/2025    CREATININE 4.27 (H) 05/06/2025    CREATININE 7.30 (H) 04/25/2025    CREATININE 6.45 (H) 04/24/2025   Continue outpatient HD 3 times weekly  Encourage renal diet compliance and monitor fluid intake  Continue all renal supplements  Follow up with Nephrology

## 2025-05-13 ENCOUNTER — HOME CARE VISIT (OUTPATIENT)
Dept: HOME HEALTH SERVICES | Facility: HOME HEALTHCARE | Age: 65
End: 2025-05-13
Payer: MEDICARE

## 2025-05-13 ENCOUNTER — APPOINTMENT (OUTPATIENT)
Facility: CLINIC | Age: 65
End: 2025-05-13
Payer: MEDICARE

## 2025-05-13 VITALS — HEART RATE: 68 BPM

## 2025-05-13 PROCEDURE — G0151 HHCP-SERV OF PT,EA 15 MIN: HCPCS

## 2025-05-14 ENCOUNTER — TELEMEDICINE (OUTPATIENT)
Dept: GERIATRICS | Facility: OTHER | Age: 65
End: 2025-05-14
Payer: MEDICARE

## 2025-05-14 ENCOUNTER — APPOINTMENT (OUTPATIENT)
Dept: LAB | Facility: CLINIC | Age: 65
End: 2025-05-14
Payer: MEDICARE

## 2025-05-14 ENCOUNTER — HOME CARE VISIT (OUTPATIENT)
Dept: HOME HEALTH SERVICES | Facility: HOME HEALTHCARE | Age: 65
End: 2025-05-14
Payer: MEDICARE

## 2025-05-14 VITALS
HEART RATE: 80 BPM | SYSTOLIC BLOOD PRESSURE: 100 MMHG | RESPIRATION RATE: 18 BRPM | TEMPERATURE: 97.8 F | DIASTOLIC BLOOD PRESSURE: 56 MMHG

## 2025-05-14 VITALS
SYSTOLIC BLOOD PRESSURE: 100 MMHG | TEMPERATURE: 97.8 F | HEART RATE: 80 BPM | DIASTOLIC BLOOD PRESSURE: 56 MMHG | RESPIRATION RATE: 18 BRPM

## 2025-05-14 DIAGNOSIS — I95.9 HYPOTENSION, UNSPECIFIED HYPOTENSION TYPE: ICD-10-CM

## 2025-05-14 DIAGNOSIS — Z99.2 ESRD ON DIALYSIS (HCC): Primary | ICD-10-CM

## 2025-05-14 DIAGNOSIS — D63.1 ANEMIA DUE TO CHRONIC KIDNEY DISEASE, ON CHRONIC DIALYSIS (HCC): ICD-10-CM

## 2025-05-14 DIAGNOSIS — E11.42 DIABETIC POLYNEUROPATHY ASSOCIATED WITH TYPE 2 DIABETES MELLITUS (HCC): ICD-10-CM

## 2025-05-14 DIAGNOSIS — Z86.718 HISTORY OF DVT (DEEP VEIN THROMBOSIS): ICD-10-CM

## 2025-05-14 DIAGNOSIS — I25.10 CAD, MULTIPLE VESSEL: ICD-10-CM

## 2025-05-14 DIAGNOSIS — N18.6 ANEMIA DUE TO CHRONIC KIDNEY DISEASE, ON CHRONIC DIALYSIS (HCC): ICD-10-CM

## 2025-05-14 DIAGNOSIS — R26.2 AMBULATORY DYSFUNCTION: ICD-10-CM

## 2025-05-14 DIAGNOSIS — Z99.2 ANEMIA DUE TO CHRONIC KIDNEY DISEASE, ON CHRONIC DIALYSIS (HCC): ICD-10-CM

## 2025-05-14 DIAGNOSIS — I50.20 HFREF (HEART FAILURE WITH REDUCED EJECTION FRACTION) (HCC): ICD-10-CM

## 2025-05-14 DIAGNOSIS — I25.5 ISCHEMIC CARDIOMYOPATHY: ICD-10-CM

## 2025-05-14 DIAGNOSIS — I82.451 ACUTE EMBOLISM AND THROMBOSIS OF RIGHT PERONEAL VEIN (HCC): ICD-10-CM

## 2025-05-14 DIAGNOSIS — N18.6 ESRD ON DIALYSIS (HCC): Primary | ICD-10-CM

## 2025-05-14 LAB
BASOPHILS # BLD AUTO: 0.04 THOUSANDS/ÂΜL (ref 0–0.1)
BASOPHILS NFR BLD AUTO: 1 % (ref 0–1)
EOSINOPHIL # BLD AUTO: 0.19 THOUSAND/ÂΜL (ref 0–0.61)
EOSINOPHIL NFR BLD AUTO: 4 % (ref 0–6)
ERYTHROCYTE [DISTWIDTH] IN BLOOD BY AUTOMATED COUNT: 16.7 % (ref 11.6–15.1)
HCT VFR BLD AUTO: 31.5 % (ref 36.5–49.3)
HGB BLD-MCNC: 9.7 G/DL (ref 12–17)
IMM GRANULOCYTES # BLD AUTO: 0.04 THOUSAND/UL (ref 0–0.2)
IMM GRANULOCYTES NFR BLD AUTO: 1 % (ref 0–2)
LYMPHOCYTES # BLD AUTO: 0.71 THOUSANDS/ÂΜL (ref 0.6–4.47)
LYMPHOCYTES NFR BLD AUTO: 13 % (ref 14–44)
MCH RBC QN AUTO: 31 PG (ref 26.8–34.3)
MCHC RBC AUTO-ENTMCNC: 30.8 G/DL (ref 31.4–37.4)
MCV RBC AUTO: 101 FL (ref 82–98)
MONOCYTES # BLD AUTO: 0.56 THOUSAND/ÂΜL (ref 0.17–1.22)
MONOCYTES NFR BLD AUTO: 11 % (ref 4–12)
NEUTROPHILS # BLD AUTO: 3.75 THOUSANDS/ÂΜL (ref 1.85–7.62)
NEUTS SEG NFR BLD AUTO: 70 % (ref 43–75)
NRBC BLD AUTO-RTO: 0 /100 WBCS
PLATELET # BLD AUTO: 99 THOUSANDS/UL (ref 149–390)
PMV BLD AUTO: 12.2 FL (ref 8.9–12.7)
RBC # BLD AUTO: 3.13 MILLION/UL (ref 3.88–5.62)
WBC # BLD AUTO: 5.29 THOUSAND/UL (ref 4.31–10.16)

## 2025-05-14 PROCEDURE — 99215 OFFICE O/P EST HI 40 MIN: CPT | Performed by: NURSE PRACTITIONER

## 2025-05-14 PROCEDURE — G0300 HHS/HOSPICE OF LPN EA 15 MIN: HCPCS

## 2025-05-14 PROCEDURE — G0152 HHCP-SERV OF OT,EA 15 MIN: HCPCS

## 2025-05-14 PROCEDURE — 36415 COLL VENOUS BLD VENIPUNCTURE: CPT

## 2025-05-14 PROCEDURE — 85025 COMPLETE CBC W/AUTO DIFF WBC: CPT

## 2025-05-14 NOTE — PROGRESS NOTES
Virtual Regular VisitName: Asad Galloway      : 1960      MRN: [id number]  Encounter Provider: BRITTANY Mirza  Encounter Date: 2025   Encounter department: [facility] VIRTUAL  :  Assessment & Plan  ESRD on dialysis ([facility])  Lab Results   Component Value Date    EGFR 13 2025    EGFR 7 2025    EGFR 8 2025    CREATININE 4.27 (H) 2025    CREATININE 7.30 (H) 2025    CREATININE 6.45 (H) 2025   Continue outpatient HD 3 times weekly  Encourage renal diet compliance and monitor fluid intake  Continue all renal supplements  Continue to monitor fistula  Follow up with Nephrology     HFrEF (heart failure with reduced ejection fraction) ([facility])  Wt Readings from Last 3 Encounters:   25 94.3 kg (208 lb)   25 94.8 kg (209 lb)   25 95 kg (209 lb 7 oz)   Echo 25 EF 20%, abnormal diastolic function, AS, AI.   Last wt 208 lbs, is weighed at HD  Continue to monitor weights  Continue to monitor BMP periodicallly  Continue GDMT Metoprolol 25 mg po QD   Continue NINA diet  Continue to monitor for s/s of fluid overload  Follow up with PCP/Cardiology      CAD, multiple vessel  Patient with multiple stents, recently 25 and 3/17/25  Continue Metoprolol, Entresto, Prasugrel, and Eliquis  Follow up with Cardiology    Ischemic cardiomyopathy  Patient with life vest in place  Echo 25 EF 20%, abnormal diastolic function, AS, AI   Continue Metoprolol 25 mg po QD  Follow up with Cardiology    Hypotension, unspecified hypotension type  BP remains stable today, 100/56  Continue Midodrine 10 mg po prior to dialysis  Encourage patient to rise slowly from seated to standing position  Maintain fall and safety precautions  Continue to monitor BP and for acute changes in condition  Follow up with PCP/Cardiology      Acute embolism and thrombosis of right peroneal vein (HCC)  History of DVT right UE  Continue Eliquis and Prasugrel  Follow up with  vascular prn      Diabetic polyneuropathy associated with type 2 diabetes mellitus (HCC)  Lab Results   Component Value Date    HGBA1C 5.4 04/10/2025   Bgs well controlled, did not check bgs today  Patient is currently diet controlled  Continue intermittent Accu-cheks   Educate patient on s/s of hyper/hypoglycemia  Continue to monitor for acute changes in condition   Follow up with PCP/Endocrinology       Ambulatory dysfunction  History of falls  Maintain fall and safety precautions  Encourage use of asst devices  Continue PT/OT services with VNA  Assess for need with assistance with transfers, mobility, and ADLs in/out of home  Patient is at high risk for falls r/t ESRD on HD, neuropathy, dyspnea, polypharmacy, environmental barriers, and age  Continue to monitor for acute changes in condition   Follow up with PCP     Anemia due to chronic kidney disease, on chronic dialysis (HCC)  Most recent Hgb 9.7/Hct 31.5, stable  Continue to monitor CBC periodically  Monitor for signs and symptoms of bleeding  Continue to monitor patient for acute changes in condition  Follow up with PCP                  History of Present Illness     Asad Galloway is a 65 y.o. male patient, being seen for Heal at home program in conjuction with VNA nurse Jeri Trujillo, who was in the home to perform physical assessment.       The patient is being seen and examined today for follow-up on acute and chronic medical conditions s/p most recent hospitalization.      The patient reports he is feeling good today. Patient has new wounds to LLE that were cleaned and dressed by VNA nurse today. He denies any recent falls, states his last fall was approx 2 weeks ago, but wound is new. He denies any issues with life vest and reports he is wearing it daily as instructed. Bp remains labile. He denies any further bleeding from fistula site. Patient denies any increase in dyspnea, CP, dizziness, HA, fever, chills, nausea, vomiting, diarrhea, or  constipation. Patient has f/u 5/15 with Vascular and 5/20 Podiatry.        Review of Systems   Constitutional:  Positive for activity change and fatigue. Negative for chills and fever.   HENT:  Negative for ear pain and sore throat.    Eyes:  Negative for pain and visual disturbance.   Respiratory:  Positive for shortness of breath. Negative for cough.    Cardiovascular:  Negative for chest pain and palpitations.   Gastrointestinal:  Negative for abdominal pain and vomiting.   Genitourinary:  Negative for dysuria and hematuria.   Musculoskeletal:  Positive for arthralgias and gait problem. Negative for back pain.   Skin:  Negative for color change and rash.   Neurological:  Positive for weakness. Negative for seizures and syncope.   Psychiatric/Behavioral:  Positive for confusion. The patient is nervous/anxious.    All other systems reviewed and are negative.    Pertinent Medical History   Patient presented to ED 4/17 after fall in parking lot suspected to be related to low BP. Trauma workup negative except for right hip hematoma. Admitted to ICU given shock, received 2 units whole blood and placed on levophed. Became hypoxic requiring intubation. CXR concerning for CHF exacerbation. Echo showed new drop in EF to 20-25%. Cardiology was consulted. Extubated to bipap and weaned to NC then RA. Infectious w/u negative. Nephrology consulted for dialysis. Had low Bps, given albumin, midodrine. Metoprolol added back on. Discontinued entresto per cardiology. Nephrology increased midodrine to 7.5 mg tid and 10 mg before dialysis. Sensipar held off by nephro given low calcium. Vitamin B12 was low and supplement added. PT/OT saw patient and rec level 1. Patient denied for ARC. Family did not want STR. Patient having some upper airway irritation likely from post intubation- was dc with medrol dose hood, cleared by cardiology. Patient was given Lifevest. Patient was stable for dc and discharged home.     Objective    Vitals    Vitals 5/14/2025 10:04 AM   /56   BP Location Right arm   Patient Position Sitting   Cuff Size Adult   Resp 18   Pulse 80   Pulse Source Apical   Cardiac Regularity Regular   Temp 97.8 °F (36.6 °C)   Temp src Temporal     5/14/2025  5:51 PM    Component Value Flag Ref Range Units Status   WBC 5.29  4.31 - 10.16 Thousand/uL Final   RBC 3.13  Low  3.88 - 5.62 Million/uL Final   Hemoglobin 9.7  Low  12.0 - 17.0 g/dL Final   Hematocrit 31.5  Low  36.5 - 49.3 % Final     High  82 - 98 fL Final   MCH 31.0  26.8 - 34.3 pg Final   MCHC 30.8  Low  31.4 - 37.4 g/dL Final   RDW 16.7  High  11.6 - 15.1 % Final   MPV 12.2  8.9 - 12.7 fL Final   Platelets 99  Low  149 - 390 Thousands/uL Final   Comment:   reviewed 5/6/25   nRBC 0   /100 WBCs Final   Segmented % 70  43 - 75 % Final   Immature Grans % 1  0 - 2 % Final   Lymphocytes % 13  Low  14 - 44 % Final   Monocytes % 11  4 - 12 % Final   Eosinophils Relative 4  0 - 6 % Final   Basophils Relative 1  0 - 1 % Final   Absolute Neutrophils 3.75  1.85 - 7.62 Thousands/µL Final   Absolute Immature Grans 0.04  0.00 - 0.20 Thousand/uL Final   Absolute Lymphocytes 0.71  0.60 - 4.47 Thousands/µL Final   Absolute Monocytes 0.56  0.17 - 1.22 Thousand/µL Final   Eosinophils Absolute 0.19  0.00 - 0.61 Thousand/µL Final   Basophils Absolute 0.04  0.00 - 0.10 Thousands/µL Final       Physical Exam  Vitals and nursing note reviewed.   Constitutional:       General: He is not in acute distress.     Appearance: He is well-developed.   HENT:      Head: Normocephalic and atraumatic.     Eyes:      Conjunctiva/sclera: Conjunctivae normal.      Comments: Wears glasses     Cardiovascular:      Rate and Rhythm: Normal rate and regular rhythm.      Heart sounds: No murmur heard.  Pulmonary:      Effort: Pulmonary effort is normal. No respiratory distress.      Breath sounds: Normal breath sounds.   Abdominal:      Palpations: Abdomen is soft.      Tenderness: There is no  abdominal tenderness.     Musculoskeletal:         General: No swelling.      Cervical back: Neck supple.     Skin:     General: Skin is warm and dry.      Capillary Refill: Capillary refill takes less than 2 seconds.     Neurological:      Mental Status: He is alert.      Motor: Weakness present.      Coordination: Coordination abnormal.      Gait: Gait abnormal.     Psychiatric:         Mood and Affect: Mood normal.         Cognition and Memory: Cognition is impaired. Memory is impaired.         Administrative Statements   Encounter provider BRITTANY Mirza    The Patient is located at Home and in the following state in which I hold an active license [place].    The patient was identified by name and date of birth. Asad Galloway was informed that this is a telemedicine visit and that the visit is being conducted through the [employer] Teams platform. He agrees to proceed..  My office door was closed. No one else was in the room.  He acknowledged consent and understanding of privacy and security of the video platform. The patient has agreed to participate and understands they can discontinue the visit at any time.    I have spent a total time of 46 minutes in caring for this patient on the day of the visit/encounter including Diagnostic results, Prognosis, Risks and benefits of tx options, Instructions for management, Patient and family education, Importance of tx compliance, Risk factor reductions, Impressions, Counseling / Coordination of care, Documenting in the medical record, Reviewing/placing orders in the medical record (including tests, medications, and/or procedures), Obtaining or reviewing history  , and Communicating with other healthcare professionals , not including the time spent for establishing the audio/video connection.

## 2025-05-14 NOTE — ASSESSMENT & PLAN NOTE
BP remains stable today, 100/56  Continue Midodrine 10 mg po prior to dialysis  Encourage patient to rise slowly from seated to standing position  Maintain fall and safety precautions  Continue to monitor BP and for acute changes in condition  Follow up with PCP/Cardiology

## 2025-05-14 NOTE — ASSESSMENT & PLAN NOTE
Lab Results   Component Value Date    HGBA1C 5.4 04/10/2025   Bgs well controlled, did not check bgs today  Patient is currently diet controlled  Continue intermittent Accu-cheks   Educate patient on s/s of hyper/hypoglycemia  Continue to monitor for acute changes in condition   Follow up with PCP/Endocrinology

## 2025-05-14 NOTE — ASSESSMENT & PLAN NOTE
Wt Readings from Last 3 Encounters:   05/02/25 94.3 kg (208 lb)   05/01/25 94.8 kg (209 lb)   04/26/25 95 kg (209 lb 7 oz)   Echo 4/17/25 EF 20%, abnormal diastolic function, AS, AI.   Last wt 208 lbs, is weighed at HD  Continue to monitor weights  Continue to monitor BMP periodicallly  Continue GDMT Metoprolol 25 mg po QD   Continue NINA diet  Continue to monitor for s/s of fluid overload  Follow up with PCP/Cardiology

## 2025-05-14 NOTE — ASSESSMENT & PLAN NOTE
Lab Results   Component Value Date    EGFR 13 05/06/2025    EGFR 7 04/25/2025    EGFR 8 04/24/2025    CREATININE 4.27 (H) 05/06/2025    CREATININE 7.30 (H) 04/25/2025    CREATININE 6.45 (H) 04/24/2025   Continue outpatient HD 3 times weekly  Encourage renal diet compliance and monitor fluid intake  Continue all renal supplements  Continue to monitor fistula  Follow up with Nephrology

## 2025-05-15 ENCOUNTER — HOME CARE VISIT (OUTPATIENT)
Dept: HOME HEALTH SERVICES | Facility: HOME HEALTHCARE | Age: 65
End: 2025-05-15
Payer: MEDICARE

## 2025-05-15 ENCOUNTER — HOSPITAL ENCOUNTER (OUTPATIENT)
Dept: NON INVASIVE DIAGNOSTICS | Facility: CLINIC | Age: 65
Discharge: HOME | DRG: 208 | End: 2025-05-15
Payer: MEDICARE

## 2025-05-15 ENCOUNTER — APPOINTMENT (OUTPATIENT)
Facility: CLINIC | Age: 65
End: 2025-05-15
Payer: MEDICARE

## 2025-05-15 VITALS — HEART RATE: 76 BPM

## 2025-05-15 DIAGNOSIS — I82.451 ACUTE EMBOLISM AND THROMBOSIS OF RIGHT PERONEAL VEIN (HCC): ICD-10-CM

## 2025-05-15 DIAGNOSIS — L97.409 NONHEALING ULCER OF HEEL (HCC): ICD-10-CM

## 2025-05-15 DIAGNOSIS — I70.244 ATHEROSCLEROSIS OF NATIVE ARTERY OF LEFT LOWER EXTREMITY WITH ULCERATION OF HEEL (HCC): Chronic | ICD-10-CM

## 2025-05-15 DIAGNOSIS — I82.441 ACUTE DEEP VEIN THROMBOSIS (DVT) OF TIBIAL VEIN OF RIGHT LOWER EXTREMITY (HCC): ICD-10-CM

## 2025-05-15 PROCEDURE — G0151 HHCP-SERV OF PT,EA 15 MIN: HCPCS

## 2025-05-15 PROCEDURE — 93925 LOWER EXTREMITY STUDY: CPT

## 2025-05-15 PROCEDURE — 93923 UPR/LXTR ART STDY 3+ LVLS: CPT

## 2025-05-16 ENCOUNTER — HOME CARE VISIT (OUTPATIENT)
Dept: HOME HEALTH SERVICES | Facility: HOME HEALTHCARE | Age: 65
End: 2025-05-16
Payer: MEDICARE

## 2025-05-16 PROCEDURE — 93925 LOWER EXTREMITY STUDY: CPT | Performed by: SURGERY

## 2025-05-16 PROCEDURE — 93922 UPR/L XTREMITY ART 2 LEVELS: CPT | Performed by: SURGERY

## 2025-05-17 ENCOUNTER — APPOINTMENT (INPATIENT)
Dept: DIALYSIS | Facility: HOSPITAL | Age: 65
DRG: 208 | End: 2025-05-17
Payer: MEDICARE

## 2025-05-17 ENCOUNTER — APPOINTMENT (EMERGENCY)
Dept: RADIOLOGY | Facility: HOSPITAL | Age: 65
DRG: 208 | End: 2025-05-17
Payer: MEDICARE

## 2025-05-17 ENCOUNTER — HOSPITAL ENCOUNTER (INPATIENT)
Facility: HOSPITAL | Age: 65
LOS: 6 days | Discharge: HOME HEALTH | DRG: 208 | End: 2025-05-23
Attending: EMERGENCY MEDICINE | Admitting: INTERNAL MEDICINE
Payer: MEDICARE

## 2025-05-17 ENCOUNTER — APPOINTMENT (EMERGENCY)
Dept: CT IMAGING | Facility: HOSPITAL | Age: 65
DRG: 208 | End: 2025-05-17
Payer: MEDICARE

## 2025-05-17 DIAGNOSIS — D63.1 ANEMIA DUE TO CHRONIC KIDNEY DISEASE, ON CHRONIC DIALYSIS (HCC): ICD-10-CM

## 2025-05-17 DIAGNOSIS — R65.10 SIRS (SYSTEMIC INFLAMMATORY RESPONSE SYNDROME) (HCC): ICD-10-CM

## 2025-05-17 DIAGNOSIS — D63.8 ANEMIA OF CHRONIC DISEASE: ICD-10-CM

## 2025-05-17 DIAGNOSIS — R79.89 ELEVATED BRAIN NATRIURETIC PEPTIDE (BNP) LEVEL: ICD-10-CM

## 2025-05-17 DIAGNOSIS — N18.6 ANEMIA DUE TO CHRONIC KIDNEY DISEASE, ON CHRONIC DIALYSIS (HCC): ICD-10-CM

## 2025-05-17 DIAGNOSIS — R79.89 ELEVATED TROPONIN: ICD-10-CM

## 2025-05-17 DIAGNOSIS — Z95.810 PRESENCE OF EXTERNAL CARDIAC DEFIBRILLATOR: ICD-10-CM

## 2025-05-17 DIAGNOSIS — R79.89 ELEVATED TSH: ICD-10-CM

## 2025-05-17 DIAGNOSIS — T68.XXXA HYPOTHERMIA, INITIAL ENCOUNTER: ICD-10-CM

## 2025-05-17 DIAGNOSIS — E87.20 LACTIC ACIDOSIS: ICD-10-CM

## 2025-05-17 DIAGNOSIS — Z99.2 ESRD ON DIALYSIS (HCC): ICD-10-CM

## 2025-05-17 DIAGNOSIS — N18.6 ESRD ON DIALYSIS (HCC): ICD-10-CM

## 2025-05-17 DIAGNOSIS — J96.01 ACUTE RESPIRATORY FAILURE WITH HYPOXIA (HCC): Primary | ICD-10-CM

## 2025-05-17 DIAGNOSIS — I50.20 HFREF (HEART FAILURE WITH REDUCED EJECTION FRACTION) (HCC): ICD-10-CM

## 2025-05-17 DIAGNOSIS — R41.82 ALTERED MENTAL STATUS: ICD-10-CM

## 2025-05-17 DIAGNOSIS — Z99.2 ANEMIA DUE TO CHRONIC KIDNEY DISEASE, ON CHRONIC DIALYSIS (HCC): ICD-10-CM

## 2025-05-17 DIAGNOSIS — K74.60 CIRRHOSIS (HCC): ICD-10-CM

## 2025-05-17 DIAGNOSIS — J81.1 PULMONARY EDEMA: ICD-10-CM

## 2025-05-17 DIAGNOSIS — I50.22 CHRONIC SYSTOLIC HEART FAILURE (HCC): ICD-10-CM

## 2025-05-17 DIAGNOSIS — K74.60 CIRRHOSIS OF LIVER (HCC): ICD-10-CM

## 2025-05-17 DIAGNOSIS — J96.01 ACUTE HYPOXIC RESPIRATORY FAILURE (HCC): ICD-10-CM

## 2025-05-17 DIAGNOSIS — G93.40 ACUTE ENCEPHALOPATHY: ICD-10-CM

## 2025-05-17 DIAGNOSIS — Z86.73 HISTORY OF STROKE IN ADULTHOOD: ICD-10-CM

## 2025-05-17 LAB
2HR DELTA HS TROPONIN: -12 NG/L
4HR DELTA HS TROPONIN: -45 NG/L
ALBUMIN SERPL BCG-MCNC: 3.8 G/DL (ref 3.5–5)
ALBUMIN SERPL BCG-MCNC: 3.8 G/DL (ref 3.5–5)
ALP SERPL-CCNC: 124 U/L (ref 34–104)
ALP SERPL-CCNC: 131 U/L (ref 34–104)
ALT SERPL W P-5'-P-CCNC: 8 U/L (ref 7–52)
ALT SERPL W P-5'-P-CCNC: 8 U/L (ref 7–52)
AMMONIA PLAS-SCNC: 13 UMOL/L (ref 18–72)
ANION GAP SERPL CALCULATED.3IONS-SCNC: 12 MMOL/L (ref 4–13)
ANION GAP SERPL CALCULATED.3IONS-SCNC: 14 MMOL/L (ref 4–13)
ANION GAP SERPL CALCULATED.3IONS-SCNC: 9 MMOL/L (ref 4–13)
APAP SERPL-MCNC: <2 UG/ML (ref 10–20)
APTT PPP: 116 SECONDS (ref 23–34)
APTT PPP: 34 SECONDS (ref 23–34)
APTT PPP: 61 SECONDS (ref 23–34)
ARTERIAL PATENCY WRIST A: YES
AST SERPL W P-5'-P-CCNC: 11 U/L (ref 13–39)
AST SERPL W P-5'-P-CCNC: 11 U/L (ref 13–39)
BASE EX.OXY STD BLDV CALC-SCNC: 92 % (ref 60–80)
BASE EXCESS BLDA CALC-SCNC: 3 MMOL/L (ref -2–3)
BASE EXCESS BLDA CALC-SCNC: 6.2 MMOL/L
BASE EXCESS BLDV CALC-SCNC: 1.2 MMOL/L
BASOPHILS # BLD AUTO: 0.02 THOUSANDS/ÂΜL (ref 0–0.1)
BASOPHILS # BLD AUTO: 0.02 THOUSANDS/ÂΜL (ref 0–0.1)
BASOPHILS NFR BLD AUTO: 0 % (ref 0–1)
BASOPHILS NFR BLD AUTO: 0 % (ref 0–1)
BILIRUB SERPL-MCNC: 0.81 MG/DL (ref 0.2–1)
BILIRUB SERPL-MCNC: 1.08 MG/DL (ref 0.2–1)
BNP SERPL-MCNC: 3179 PG/ML (ref 0–100)
BUN SERPL-MCNC: 30 MG/DL (ref 5–25)
BUN SERPL-MCNC: 32 MG/DL (ref 5–25)
BUN SERPL-MCNC: 32 MG/DL (ref 5–25)
CA-I BLD-SCNC: 0.79 MMOL/L (ref 1.12–1.32)
CA-I BLD-SCNC: 0.85 MMOL/L (ref 1.12–1.32)
CA-I BLD-SCNC: 0.87 MMOL/L (ref 1.12–1.32)
CA-I BLD-SCNC: 1.1 MMOL/L (ref 1.12–1.32)
CALCIUM SERPL-MCNC: 6.5 MG/DL (ref 8.4–10.2)
CALCIUM SERPL-MCNC: 7.7 MG/DL (ref 8.4–10.2)
CALCIUM SERPL-MCNC: 7.8 MG/DL (ref 8.4–10.2)
CARDIAC TROPONIN I PNL SERPL HS: 43 NG/L (ref ?–50)
CARDIAC TROPONIN I PNL SERPL HS: 76 NG/L (ref ?–50)
CARDIAC TROPONIN I PNL SERPL HS: 88 NG/L (ref ?–50)
CHLORIDE SERPL-SCNC: 100 MMOL/L (ref 96–108)
CHLORIDE SERPL-SCNC: 92 MMOL/L (ref 96–108)
CHLORIDE SERPL-SCNC: 93 MMOL/L (ref 96–108)
CO2 SERPL-SCNC: 27 MMOL/L (ref 21–32)
CO2 SERPL-SCNC: 28 MMOL/L (ref 21–32)
CO2 SERPL-SCNC: 29 MMOL/L (ref 21–32)
CREAT SERPL-MCNC: 4.31 MG/DL (ref 0.6–1.3)
CREAT SERPL-MCNC: 4.94 MG/DL (ref 0.6–1.3)
CREAT SERPL-MCNC: 5.02 MG/DL (ref 0.6–1.3)
EOSINOPHIL # BLD AUTO: 0.1 THOUSAND/ÂΜL (ref 0–0.61)
EOSINOPHIL # BLD AUTO: 0.15 THOUSAND/ÂΜL (ref 0–0.61)
EOSINOPHIL NFR BLD AUTO: 2 % (ref 0–6)
EOSINOPHIL NFR BLD AUTO: 3 % (ref 0–6)
ERYTHROCYTE [DISTWIDTH] IN BLOOD BY AUTOMATED COUNT: 16.9 % (ref 11.6–15.1)
ERYTHROCYTE [DISTWIDTH] IN BLOOD BY AUTOMATED COUNT: 17 % (ref 11.6–15.1)
ETHANOL SERPL-MCNC: <10 MG/DL
FLUAV RNA RESP QL NAA+PROBE: NEGATIVE
FLUBV RNA RESP QL NAA+PROBE: NEGATIVE
GFR SERPL CREATININE-BSD FRML MDRD: 11 ML/MIN/1.73SQ M
GFR SERPL CREATININE-BSD FRML MDRD: 11 ML/MIN/1.73SQ M
GFR SERPL CREATININE-BSD FRML MDRD: 13 ML/MIN/1.73SQ M
GLUCOSE SERPL-MCNC: 117 MG/DL (ref 65–140)
GLUCOSE SERPL-MCNC: 147 MG/DL (ref 65–140)
GLUCOSE SERPL-MCNC: 152 MG/DL (ref 65–140)
GLUCOSE SERPL-MCNC: 153 MG/DL (ref 65–140)
GLUCOSE SERPL-MCNC: 93 MG/DL (ref 65–140)
HCO3 BLDA-SCNC: 26 MMOL/L (ref 24–30)
HCO3 BLDA-SCNC: 28.7 MMOL/L (ref 22–28)
HCO3 BLDV-SCNC: 24.9 MMOL/L (ref 24–30)
HCT VFR BLD AUTO: 26.2 % (ref 36.5–49.3)
HCT VFR BLD AUTO: 29.2 % (ref 36.5–49.3)
HCT VFR BLD AUTO: 30.2 % (ref 36.5–49.3)
HCT VFR BLD AUTO: 31 % (ref 36.5–49.3)
HCT VFR BLD AUTO: 31.1 % (ref 36.5–49.3)
HCT VFR BLD CALC: 33 % (ref 36.5–49.3)
HGB BLD-MCNC: 7.6 G/DL (ref 12–17)
HGB BLD-MCNC: 8.8 G/DL (ref 12–17)
HGB BLD-MCNC: 9.5 G/DL (ref 12–17)
HGB BLD-MCNC: 9.7 G/DL (ref 12–17)
HGB BLD-MCNC: 9.8 G/DL (ref 12–17)
HGB BLDA-MCNC: 11.2 G/DL (ref 12–17)
HOROWITZ INDEX BLDA+IHG-RTO: 100 MM[HG]
IMM GRANULOCYTES # BLD AUTO: 0.03 THOUSAND/UL (ref 0–0.2)
IMM GRANULOCYTES # BLD AUTO: 0.03 THOUSAND/UL (ref 0–0.2)
IMM GRANULOCYTES NFR BLD AUTO: 1 % (ref 0–2)
IMM GRANULOCYTES NFR BLD AUTO: 1 % (ref 0–2)
INR PPP: 1.47 (ref 0.85–1.19)
INR PPP: 1.58 (ref 0.85–1.19)
LACTATE SERPL-SCNC: 2.8 MMOL/L (ref 0.5–2)
LACTATE SERPL-SCNC: 3.1 MMOL/L (ref 0.5–2)
LYMPHOCYTES # BLD AUTO: 0.69 THOUSANDS/ÂΜL (ref 0.6–4.47)
LYMPHOCYTES # BLD AUTO: 0.92 THOUSANDS/ÂΜL (ref 0.6–4.47)
LYMPHOCYTES NFR BLD AUTO: 11 % (ref 14–44)
LYMPHOCYTES NFR BLD AUTO: 19 % (ref 14–44)
MAGNESIUM SERPL-MCNC: 1.7 MG/DL (ref 1.9–2.7)
MAGNESIUM SERPL-MCNC: 2.1 MG/DL (ref 1.9–2.7)
MAGNESIUM SERPL-MCNC: 2.1 MG/DL (ref 1.9–2.7)
MCH RBC QN AUTO: 31.3 PG (ref 26.8–34.3)
MCH RBC QN AUTO: 31.8 PG (ref 26.8–34.3)
MCHC RBC AUTO-ENTMCNC: 31.2 G/DL (ref 31.4–37.4)
MCHC RBC AUTO-ENTMCNC: 31.6 G/DL (ref 31.4–37.4)
MCV RBC AUTO: 100 FL (ref 82–98)
MCV RBC AUTO: 101 FL (ref 82–98)
MONOCYTES # BLD AUTO: 0.47 THOUSAND/ÂΜL (ref 0.17–1.22)
MONOCYTES # BLD AUTO: 0.47 THOUSAND/ÂΜL (ref 0.17–1.22)
MONOCYTES NFR BLD AUTO: 10 % (ref 4–12)
MONOCYTES NFR BLD AUTO: 8 % (ref 4–12)
NEUTROPHILS # BLD AUTO: 3.37 THOUSANDS/ÂΜL (ref 1.85–7.62)
NEUTROPHILS # BLD AUTO: 4.8 THOUSANDS/ÂΜL (ref 1.85–7.62)
NEUTS SEG NFR BLD AUTO: 67 % (ref 43–75)
NEUTS SEG NFR BLD AUTO: 78 % (ref 43–75)
NRBC BLD AUTO-RTO: 0 /100 WBCS
NRBC BLD AUTO-RTO: 0 /100 WBCS
O2 CT BLDA-SCNC: 16.8 ML/DL (ref 16–23)
O2 CT BLDV-SCNC: 14.2 ML/DL
OXYHGB MFR BLDA: 99.4 % (ref 94–97)
PCO2 BLD: 27 MMOL/L (ref 21–32)
PCO2 BLD: 34.9 MM HG (ref 42–50)
PCO2 BLDA: 33.9 MM HG (ref 36–44)
PCO2 BLDV: 36 MM HG (ref 42–50)
PEEP RESPIRATORY: 8 CM[H2O]
PH BLD: 7.48 [PH] (ref 7.3–7.4)
PH BLDA: 7.54 [PH] (ref 7.35–7.45)
PH BLDV: 7.46 [PH] (ref 7.3–7.4)
PHOSPHATE SERPL-MCNC: 4.4 MG/DL (ref 2.3–4.1)
PHOSPHATE SERPL-MCNC: 5.5 MG/DL (ref 2.3–4.1)
PLATELET # BLD AUTO: 91 THOUSANDS/UL (ref 149–390)
PLATELET # BLD AUTO: 95 THOUSANDS/UL (ref 149–390)
PMV BLD AUTO: 11.9 FL (ref 8.9–12.7)
PMV BLD AUTO: 12.5 FL (ref 8.9–12.7)
PO2 BLD: 44 MM HG (ref 35–45)
PO2 BLDA: 427.3 MM HG (ref 75–129)
PO2 BLDV: 83.4 MM HG (ref 35–45)
POTASSIUM BLD-SCNC: 4.7 MMOL/L (ref 3.5–5.3)
POTASSIUM SERPL-SCNC: 3.8 MMOL/L (ref 3.5–5.3)
POTASSIUM SERPL-SCNC: 5 MMOL/L (ref 3.5–5.3)
POTASSIUM SERPL-SCNC: 5.2 MMOL/L (ref 3.5–5.3)
PROCALCITONIN SERPL-MCNC: 0.23 NG/ML
PROT SERPL-MCNC: 6.5 G/DL (ref 6.4–8.4)
PROT SERPL-MCNC: 6.6 G/DL (ref 6.4–8.4)
PROTHROMBIN TIME: 18.5 SECONDS (ref 12.3–15)
PROTHROMBIN TIME: 19.6 SECONDS (ref 12.3–15)
RBC # BLD AUTO: 3.08 MILLION/UL (ref 3.88–5.62)
RBC # BLD AUTO: 3.1 MILLION/UL (ref 3.88–5.62)
RSV RNA RESP QL NAA+PROBE: NEGATIVE
SALICYLATES SERPL-MCNC: <5 MG/DL (ref 5–20)
SAO2 % BLD FROM PO2: 83 % (ref 60–85)
SARS-COV-2 RNA RESP QL NAA+PROBE: NEGATIVE
SODIUM BLD-SCNC: 131 MMOL/L (ref 136–145)
SODIUM SERPL-SCNC: 134 MMOL/L (ref 135–147)
SODIUM SERPL-SCNC: 134 MMOL/L (ref 135–147)
SODIUM SERPL-SCNC: 136 MMOL/L (ref 135–147)
SPECIMEN SOURCE: ABNORMAL
SPECIMEN SOURCE: ABNORMAL
T4 FREE SERPL-MCNC: 1.02 NG/DL (ref 0.61–1.12)
TRIGL SERPL-MCNC: 41 MG/DL (ref ?–150)
TSH SERPL DL<=0.05 MIU/L-ACNC: 12.39 UIU/ML (ref 0.45–4.5)
VENT AC: 14
VENT- AC: AC
VT SETTING VENT: 500 ML
WBC # BLD AUTO: 4.96 THOUSAND/UL (ref 4.31–10.16)
WBC # BLD AUTO: 6.11 THOUSAND/UL (ref 4.31–10.16)

## 2025-05-17 PROCEDURE — 5A1945Z RESPIRATORY VENTILATION, 24-96 CONSECUTIVE HOURS: ICD-10-PCS | Performed by: INTERNAL MEDICINE

## 2025-05-17 PROCEDURE — 99223 1ST HOSP IP/OBS HIGH 75: CPT | Performed by: INTERNAL MEDICINE

## 2025-05-17 PROCEDURE — 82140 ASSAY OF AMMONIA: CPT | Performed by: EMERGENCY MEDICINE

## 2025-05-17 PROCEDURE — 85018 HEMOGLOBIN: CPT | Performed by: NURSE PRACTITIONER

## 2025-05-17 PROCEDURE — 85730 THROMBOPLASTIN TIME PARTIAL: CPT

## 2025-05-17 PROCEDURE — 71045 X-RAY EXAM CHEST 1 VIEW: CPT

## 2025-05-17 PROCEDURE — 72125 CT NECK SPINE W/O DYE: CPT

## 2025-05-17 PROCEDURE — 99222 1ST HOSP IP/OBS MODERATE 55: CPT | Performed by: INTERNAL MEDICINE

## 2025-05-17 PROCEDURE — 85730 THROMBOPLASTIN TIME PARTIAL: CPT | Performed by: SURGERY

## 2025-05-17 PROCEDURE — 82330 ASSAY OF CALCIUM: CPT

## 2025-05-17 PROCEDURE — 82805 BLOOD GASES W/O2 SATURATION: CPT | Performed by: NURSE PRACTITIONER

## 2025-05-17 PROCEDURE — 85014 HEMATOCRIT: CPT

## 2025-05-17 PROCEDURE — 80143 DRUG ASSAY ACETAMINOPHEN: CPT

## 2025-05-17 PROCEDURE — 84478 ASSAY OF TRIGLYCERIDES: CPT | Performed by: INTERNAL MEDICINE

## 2025-05-17 PROCEDURE — 83735 ASSAY OF MAGNESIUM: CPT | Performed by: STUDENT IN AN ORGANIZED HEALTH CARE EDUCATION/TRAINING PROGRAM

## 2025-05-17 PROCEDURE — 85018 HEMOGLOBIN: CPT | Performed by: INTERNAL MEDICINE

## 2025-05-17 PROCEDURE — 80048 BASIC METABOLIC PNL TOTAL CA: CPT | Performed by: STUDENT IN AN ORGANIZED HEALTH CARE EDUCATION/TRAINING PROGRAM

## 2025-05-17 PROCEDURE — 99223 1ST HOSP IP/OBS HIGH 75: CPT | Performed by: STUDENT IN AN ORGANIZED HEALTH CARE EDUCATION/TRAINING PROGRAM

## 2025-05-17 PROCEDURE — 82077 ASSAY SPEC XCP UR&BREATH IA: CPT

## 2025-05-17 PROCEDURE — 80053 COMPREHEN METABOLIC PANEL: CPT

## 2025-05-17 PROCEDURE — 84484 ASSAY OF TROPONIN QUANT: CPT

## 2025-05-17 PROCEDURE — 84295 ASSAY OF SERUM SODIUM: CPT

## 2025-05-17 PROCEDURE — 80179 DRUG ASSAY SALICYLATE: CPT

## 2025-05-17 PROCEDURE — 5A1D70Z PERFORMANCE OF URINARY FILTRATION, INTERMITTENT, LESS THAN 6 HOURS PER DAY: ICD-10-PCS | Performed by: STUDENT IN AN ORGANIZED HEALTH CARE EDUCATION/TRAINING PROGRAM

## 2025-05-17 PROCEDURE — 70450 CT HEAD/BRAIN W/O DYE: CPT

## 2025-05-17 PROCEDURE — 82948 REAGENT STRIP/BLOOD GLUCOSE: CPT

## 2025-05-17 PROCEDURE — 71250 CT THORAX DX C-: CPT

## 2025-05-17 PROCEDURE — 87040 BLOOD CULTURE FOR BACTERIA: CPT

## 2025-05-17 PROCEDURE — 85025 COMPLETE CBC W/AUTO DIFF WBC: CPT

## 2025-05-17 PROCEDURE — 84132 ASSAY OF SERUM POTASSIUM: CPT

## 2025-05-17 PROCEDURE — 99285 EMERGENCY DEPT VISIT HI MDM: CPT

## 2025-05-17 PROCEDURE — 83605 ASSAY OF LACTIC ACID: CPT

## 2025-05-17 PROCEDURE — 74176 CT ABD & PELVIS W/O CONTRAST: CPT

## 2025-05-17 PROCEDURE — 94760 N-INVAS EAR/PLS OXIMETRY 1: CPT

## 2025-05-17 PROCEDURE — 96374 THER/PROPH/DIAG INJ IV PUSH: CPT

## 2025-05-17 PROCEDURE — 82805 BLOOD GASES W/O2 SATURATION: CPT

## 2025-05-17 PROCEDURE — 83880 ASSAY OF NATRIURETIC PEPTIDE: CPT | Performed by: EMERGENCY MEDICINE

## 2025-05-17 PROCEDURE — 0241U HB NFCT DS VIR RESP RNA 4 TRGT: CPT | Performed by: EMERGENCY MEDICINE

## 2025-05-17 PROCEDURE — 82330 ASSAY OF CALCIUM: CPT | Performed by: STUDENT IN AN ORGANIZED HEALTH CARE EDUCATION/TRAINING PROGRAM

## 2025-05-17 PROCEDURE — 85730 THROMBOPLASTIN TIME PARTIAL: CPT | Performed by: INTERNAL MEDICINE

## 2025-05-17 PROCEDURE — 0BH17EZ INSERTION OF ENDOTRACHEAL AIRWAY INTO TRACHEA, VIA NATURAL OR ARTIFICIAL OPENING: ICD-10-PCS | Performed by: EMERGENCY MEDICINE

## 2025-05-17 PROCEDURE — 84439 ASSAY OF FREE THYROXINE: CPT | Performed by: EMERGENCY MEDICINE

## 2025-05-17 PROCEDURE — 94150 VITAL CAPACITY TEST: CPT

## 2025-05-17 PROCEDURE — 87081 CULTURE SCREEN ONLY: CPT

## 2025-05-17 PROCEDURE — 83735 ASSAY OF MAGNESIUM: CPT

## 2025-05-17 PROCEDURE — 85018 HEMOGLOBIN: CPT

## 2025-05-17 PROCEDURE — 85610 PROTHROMBIN TIME: CPT | Performed by: INTERNAL MEDICINE

## 2025-05-17 PROCEDURE — 93005 ELECTROCARDIOGRAM TRACING: CPT

## 2025-05-17 PROCEDURE — 85014 HEMATOCRIT: CPT | Performed by: INTERNAL MEDICINE

## 2025-05-17 PROCEDURE — 84100 ASSAY OF PHOSPHORUS: CPT | Performed by: STUDENT IN AN ORGANIZED HEALTH CARE EDUCATION/TRAINING PROGRAM

## 2025-05-17 PROCEDURE — 84145 PROCALCITONIN (PCT): CPT

## 2025-05-17 PROCEDURE — 94664 DEMO&/EVAL PT USE INHALER: CPT

## 2025-05-17 PROCEDURE — 85610 PROTHROMBIN TIME: CPT

## 2025-05-17 PROCEDURE — 84100 ASSAY OF PHOSPHORUS: CPT

## 2025-05-17 PROCEDURE — 82947 ASSAY GLUCOSE BLOOD QUANT: CPT

## 2025-05-17 PROCEDURE — 85014 HEMATOCRIT: CPT | Performed by: NURSE PRACTITIONER

## 2025-05-17 PROCEDURE — 36415 COLL VENOUS BLD VENIPUNCTURE: CPT | Performed by: EMERGENCY MEDICINE

## 2025-05-17 PROCEDURE — 82803 BLOOD GASES ANY COMBINATION: CPT

## 2025-05-17 PROCEDURE — 84443 ASSAY THYROID STIM HORMONE: CPT | Performed by: EMERGENCY MEDICINE

## 2025-05-17 PROCEDURE — 94002 VENT MGMT INPAT INIT DAY: CPT

## 2025-05-17 RX ORDER — ONDANSETRON 2 MG/ML
INJECTION INTRAMUSCULAR; INTRAVENOUS
Status: COMPLETED
Start: 2025-05-17 | End: 2025-05-17

## 2025-05-17 RX ORDER — FENTANYL CITRATE 50 UG/ML
50 INJECTION, SOLUTION INTRAMUSCULAR; INTRAVENOUS EVERY 4 HOURS PRN
Refills: 0 | Status: DISCONTINUED | OUTPATIENT
Start: 2025-05-17 | End: 2025-05-18

## 2025-05-17 RX ORDER — DEXMEDETOMIDINE HYDROCHLORIDE 4 UG/ML
.1-.7 INJECTION, SOLUTION INTRAVENOUS
Status: DISCONTINUED | OUTPATIENT
Start: 2025-05-17 | End: 2025-05-18

## 2025-05-17 RX ORDER — ETOMIDATE 2 MG/ML
INJECTION INTRAVENOUS CODE/TRAUMA/SEDATION MEDICATION
Status: COMPLETED | OUTPATIENT
Start: 2025-05-17 | End: 2025-05-17

## 2025-05-17 RX ORDER — PROPOFOL 10 MG/ML
5-50 INJECTION, EMULSION INTRAVENOUS
Status: DISCONTINUED | OUTPATIENT
Start: 2025-05-17 | End: 2025-05-17

## 2025-05-17 RX ORDER — HEPARIN SODIUM 1000 [USP'U]/ML
2000 INJECTION, SOLUTION INTRAVENOUS; SUBCUTANEOUS EVERY 6 HOURS PRN
Status: DISCONTINUED | OUTPATIENT
Start: 2025-05-17 | End: 2025-05-19

## 2025-05-17 RX ORDER — HEPARIN SODIUM 1000 [USP'U]/ML
4000 INJECTION, SOLUTION INTRAVENOUS; SUBCUTANEOUS ONCE
Status: COMPLETED | OUTPATIENT
Start: 2025-05-17 | End: 2025-05-17

## 2025-05-17 RX ORDER — CALCIUM GLUCONATE 20 MG/ML
2 INJECTION, SOLUTION INTRAVENOUS ONCE
Status: COMPLETED | OUTPATIENT
Start: 2025-05-17 | End: 2025-05-17

## 2025-05-17 RX ORDER — HEPARIN SODIUM 1000 [USP'U]/ML
4000 INJECTION, SOLUTION INTRAVENOUS; SUBCUTANEOUS EVERY 6 HOURS PRN
Status: DISCONTINUED | OUTPATIENT
Start: 2025-05-17 | End: 2025-05-19

## 2025-05-17 RX ORDER — NALOXONE HYDROCHLORIDE 0.4 MG/ML
0.1 INJECTION, SOLUTION INTRAMUSCULAR; INTRAVENOUS; SUBCUTANEOUS ONCE
Status: COMPLETED | OUTPATIENT
Start: 2025-05-17 | End: 2025-05-17

## 2025-05-17 RX ORDER — PRASUGREL 10 MG/1
10 TABLET, FILM COATED ORAL DAILY
Status: DISCONTINUED | OUTPATIENT
Start: 2025-05-17 | End: 2025-05-23 | Stop reason: HOSPADM

## 2025-05-17 RX ORDER — HEPARIN SODIUM 5000 [USP'U]/ML
5000 INJECTION, SOLUTION INTRAVENOUS; SUBCUTANEOUS EVERY 8 HOURS SCHEDULED
Status: DISCONTINUED | OUTPATIENT
Start: 2025-05-17 | End: 2025-05-17

## 2025-05-17 RX ORDER — CHLORHEXIDINE GLUCONATE ORAL RINSE 1.2 MG/ML
15 SOLUTION DENTAL EVERY 12 HOURS SCHEDULED
Status: DISCONTINUED | OUTPATIENT
Start: 2025-05-17 | End: 2025-05-23 | Stop reason: HOSPADM

## 2025-05-17 RX ORDER — MAGNESIUM SULFATE HEPTAHYDRATE 40 MG/ML
2 INJECTION, SOLUTION INTRAVENOUS ONCE
Status: COMPLETED | OUTPATIENT
Start: 2025-05-17 | End: 2025-05-18

## 2025-05-17 RX ORDER — PANTOPRAZOLE SODIUM 40 MG/10ML
40 INJECTION, POWDER, LYOPHILIZED, FOR SOLUTION INTRAVENOUS EVERY 12 HOURS SCHEDULED
Status: DISCONTINUED | OUTPATIENT
Start: 2025-05-18 | End: 2025-05-20

## 2025-05-17 RX ORDER — ROCURONIUM BROMIDE 10 MG/ML
100 INJECTION, SOLUTION INTRAVENOUS ONCE
Status: DISCONTINUED | OUTPATIENT
Start: 2025-05-17 | End: 2025-05-17

## 2025-05-17 RX ORDER — PANTOPRAZOLE SODIUM 40 MG/10ML
40 INJECTION, POWDER, LYOPHILIZED, FOR SOLUTION INTRAVENOUS
Status: DISCONTINUED | OUTPATIENT
Start: 2025-05-17 | End: 2025-05-17

## 2025-05-17 RX ORDER — CHLORHEXIDINE GLUCONATE ORAL RINSE 1.2 MG/ML
15 SOLUTION DENTAL EVERY 12 HOURS SCHEDULED
Status: DISCONTINUED | OUTPATIENT
Start: 2025-05-17 | End: 2025-05-17

## 2025-05-17 RX ORDER — ASPIRIN 81 MG/1
81 TABLET, CHEWABLE ORAL DAILY
Status: DISCONTINUED | OUTPATIENT
Start: 2025-05-18 | End: 2025-05-23 | Stop reason: HOSPADM

## 2025-05-17 RX ORDER — NALOXONE HYDROCHLORIDE 1 MG/ML
2 INJECTION INTRAMUSCULAR; INTRAVENOUS; SUBCUTANEOUS ONCE
Status: COMPLETED | OUTPATIENT
Start: 2025-05-17 | End: 2025-05-17

## 2025-05-17 RX ORDER — ATORVASTATIN CALCIUM 40 MG/1
40 TABLET, FILM COATED ORAL
Status: DISCONTINUED | OUTPATIENT
Start: 2025-05-17 | End: 2025-05-23 | Stop reason: HOSPADM

## 2025-05-17 RX ORDER — ENOXAPARIN SODIUM 100 MG/ML
40 INJECTION SUBCUTANEOUS DAILY
Status: DISCONTINUED | OUTPATIENT
Start: 2025-05-17 | End: 2025-05-17

## 2025-05-17 RX ORDER — NALOXONE HYDROCHLORIDE 0.4 MG/ML
INJECTION, SOLUTION INTRAMUSCULAR; INTRAVENOUS; SUBCUTANEOUS
Status: COMPLETED
Start: 2025-05-17 | End: 2025-05-17

## 2025-05-17 RX ORDER — VANCOMYCIN HYDROCHLORIDE 750 MG/150ML
10 INJECTION, SOLUTION INTRAVENOUS 3 TIMES WEEKLY
Status: DISCONTINUED | OUTPATIENT
Start: 2025-05-19 | End: 2025-05-18

## 2025-05-17 RX ORDER — HEPARIN SODIUM 10000 [USP'U]/100ML
3-20 INJECTION, SOLUTION INTRAVENOUS
Status: DISCONTINUED | OUTPATIENT
Start: 2025-05-17 | End: 2025-05-19

## 2025-05-17 RX ORDER — METOPROLOL SUCCINATE 25 MG/1
25 TABLET, EXTENDED RELEASE ORAL 2 TIMES DAILY
Status: DISCONTINUED | OUTPATIENT
Start: 2025-05-17 | End: 2025-05-23 | Stop reason: HOSPADM

## 2025-05-17 RX ORDER — MIDODRINE HYDROCHLORIDE 5 MG/1
10 TABLET ORAL
Status: DISCONTINUED | OUTPATIENT
Start: 2025-05-17 | End: 2025-05-18

## 2025-05-17 RX ORDER — ROCURONIUM BROMIDE 10 MG/ML
INJECTION, SOLUTION INTRAVENOUS CODE/TRAUMA/SEDATION MEDICATION
Status: COMPLETED | OUTPATIENT
Start: 2025-05-17 | End: 2025-05-17

## 2025-05-17 RX ORDER — VANCOMYCIN HYDROCHLORIDE 750 MG/150ML
750 INJECTION, SOLUTION INTRAVENOUS ONCE
Status: COMPLETED | OUTPATIENT
Start: 2025-05-17 | End: 2025-05-17

## 2025-05-17 RX ADMIN — MAGNESIUM SULFATE HEPTAHYDRATE 2 G: 40 INJECTION, SOLUTION INTRAVENOUS at 17:03

## 2025-05-17 RX ADMIN — HEPARIN SODIUM 4000 UNITS: 1000 INJECTION, SOLUTION INTRAVENOUS; SUBCUTANEOUS at 09:52

## 2025-05-17 RX ADMIN — MIDODRINE HYDROCHLORIDE 10 MG: 5 TABLET ORAL at 12:02

## 2025-05-17 RX ADMIN — CALCIUM GLUCONATE 2 G: 20 INJECTION, SOLUTION INTRAVENOUS at 09:46

## 2025-05-17 RX ADMIN — NALOXONE HYDROCHLORIDE 0.4 MG: 0.4 INJECTION, SOLUTION INTRAMUSCULAR; INTRAVENOUS; SUBCUTANEOUS at 03:06

## 2025-05-17 RX ADMIN — FENTANYL CITRATE 50 MCG: 50 INJECTION INTRAMUSCULAR; INTRAVENOUS at 16:47

## 2025-05-17 RX ADMIN — VANCOMYCIN HYDROCHLORIDE 2000 MG: 5 INJECTION, POWDER, LYOPHILIZED, FOR SOLUTION INTRAVENOUS at 06:54

## 2025-05-17 RX ADMIN — ONDANSETRON 4 MG: 2 INJECTION INTRAMUSCULAR; INTRAVENOUS at 15:07

## 2025-05-17 RX ADMIN — ROCURONIUM 50 MG: 50 INJECTION, SOLUTION INTRAVENOUS at 03:14

## 2025-05-17 RX ADMIN — CHLORHEXIDINE GLUCONATE 15 ML: 1.2 SOLUTION ORAL at 08:29

## 2025-05-17 RX ADMIN — ROCURONIUM 50 MG: 50 INJECTION, SOLUTION INTRAVENOUS at 03:17

## 2025-05-17 RX ADMIN — PROPOFOL 10 MCG/KG/MIN: 10 INJECTION, EMULSION INTRAVENOUS at 04:10

## 2025-05-17 RX ADMIN — CEFEPIME 1000 MG: 1 INJECTION, POWDER, FOR SOLUTION INTRAMUSCULAR; INTRAVENOUS at 06:19

## 2025-05-17 RX ADMIN — VANCOMYCIN HYDROCHLORIDE 750 MG: 750 INJECTION, SOLUTION INTRAVENOUS at 15:08

## 2025-05-17 RX ADMIN — CALCIUM GLUCONATE 2 G: 20 INJECTION, SOLUTION INTRAVENOUS at 16:22

## 2025-05-17 RX ADMIN — NOREPINEPHRINE BITARTRATE 2 MCG/MIN: 1 INJECTION INTRAVENOUS at 04:32

## 2025-05-17 RX ADMIN — HEPARIN SODIUM 11.1 UNITS/KG/HR: 10000 INJECTION, SOLUTION INTRAVENOUS at 10:29

## 2025-05-17 RX ADMIN — ETOMIDATE 20 MG: 2 INJECTION INTRAVENOUS at 03:14

## 2025-05-17 RX ADMIN — NALOXONE HYDROCHLORIDE 2 MG: 1 INJECTION PARENTERAL at 03:08

## 2025-05-17 RX ADMIN — PANTOPRAZOLE SODIUM 40 MG: 40 INJECTION, POWDER, FOR SOLUTION INTRAVENOUS at 16:22

## 2025-05-17 RX ADMIN — NALXONE HYDROCHLORIDE 0.4 MG: 0.4 INJECTION INTRAMUSCULAR; INTRAVENOUS; SUBCUTANEOUS at 03:06

## 2025-05-17 RX ADMIN — FENTANYL CITRATE 50 MCG: 50 INJECTION INTRAMUSCULAR; INTRAVENOUS at 06:18

## 2025-05-17 RX ADMIN — CHLORHEXIDINE GLUCONATE 15 ML: 1.2 SOLUTION ORAL at 20:40

## 2025-05-17 RX ADMIN — ATORVASTATIN CALCIUM 40 MG: 40 TABLET, FILM COATED ORAL at 16:23

## 2025-05-17 RX ADMIN — HEPARIN SODIUM 5000 UNITS: 5000 INJECTION INTRAVENOUS; SUBCUTANEOUS at 05:35

## 2025-05-17 NOTE — ASSESSMENT & PLAN NOTE
Presented to the ED via EMS after being found unresponsive by family at home. Unknown amount of downtime. Noted to be on HD 3x per week which recently switched to home HD with less removal of fluid. Usually has 4.5-5 L removed and now only about 1 L removed per session. Dry weight is 94 kg as per family and is 107 kg in ED. Elevated lactate.     -Intubated for airway protection  -CTH pending  -CT chest/a/p pending  -Neuro checks Q1H  -Septic workup started  -Trend lactate  -Started on vanco/cefepime for empiric coverage

## 2025-05-17 NOTE — ED PROCEDURE NOTE
Procedure  Intubation    Date/Time: 5/17/2025 3:40 AM    Performed by: Saw Stafford DO  Authorized by: Saw Stafford DO    Patient location:  ED  Consent:     Consent obtained:  Emergent situation  Universal protocol:     Patient identity confirmed:  Arm band  Pre-procedure details:     Mallampati score:  3    Pretreatment medications:  Etomidate    Paralytics:  Rocuronium  Indications:     Indications for intubation: airway protection    Procedure details:     Preoxygenation:  Bag valve mask    CPR in progress: no      Intubation method:  Oral    Oral intubation technique:  Glidescope    Laryngoscope blade:  Mac 3    Tube size (mm):  7.5    Tube type:  Cuffed    Number of attempts:  2    Ventilation between attempts: yes      Cricoid pressure: no      Tube visualized through cords: yes    Placement assessment:     ETT to lip:  24    Tube secured with:  ETT ahumada    Breath sounds:  Equal    Placement verification: chest rise, condensation, colorimetric ETCO2 device, CXR verification, equal breath sounds and ETCO2 detector      CXR findings:  ETT in proper place  Post-procedure details:     Patient tolerance of procedure:  Tolerated well, no immediate complications                   Saw Stafford DO  05/17/25 0343

## 2025-05-17 NOTE — ASSESSMENT & PLAN NOTE
Lab Results   Component Value Date    EGFR 13 05/17/2025    EGFR 11 05/17/2025    EGFR 11 05/17/2025    CREATININE 4.31 (H) 05/17/2025    CREATININE 4.94 (H) 05/17/2025    CREATININE 5.02 (H) 05/17/2025

## 2025-05-17 NOTE — PROGRESS NOTES
Asad Galloway is a 65 y.o. male who is currently ordered Vancomycin IV with management by the Pharmacy Consult service.  Relevant clinical data and objective / subjective history reviewed.  Vancomycin Assessment:  Indication and Goal AUC/Trough: Pneumonia (goal -600, trough >10)  Clinical Status: critically ill  Micro:     Renal Function:  SCr: 4.31 mg/dL  CrCl: 19.8 mL/min  Renal replacement: HD  Days of Therapy: 1  Current Dose: 2000 mg IV loading dose  Vancomycin Plan:  New Dosin mg IV post-HD  Next Level:  at 0600 (before 3rd HD )  Renal Function Monitoring: Daily BMP and UOP  Pharmacy will continue to follow closely for s/sx of nephrotoxicity, infusion reactions and appropriateness of therapy.  BMP and CBC will be ordered per protocol. We will continue to follow the patient’s culture results and clinical progress daily.    Chico Santiago, Pharmacist

## 2025-05-17 NOTE — Clinical Note
Case was discussed with Critical Care and the patient's admission status was agreed to be Admission Status: inpatient status to the service of Dr. Mueller .

## 2025-05-17 NOTE — ASSESSMENT & PLAN NOTE
Lab Results   Component Value Date    EGFR 11 05/17/2025    EGFR 11 05/17/2025    EGFR 13 05/06/2025    CREATININE 4.94 (H) 05/17/2025    CREATININE 5.02 (H) 05/17/2025    CREATININE 4.27 (H) 05/06/2025   #ESRD on HD MWF:  Dialysis unit/days: [facility]   Access:AV fistula  EDW 95 kg, unable to achieve dry weight as per family  Renal Diet  Fluid restriction 1-1.5L/d  Adjust medications to GFR<10  Avoid opioids   Patent is chronically hypotensive , currently on NE. Will attempt iHD . If he doesn't tolerate then we will need to do CRRT

## 2025-05-17 NOTE — ASSESSMENT & PLAN NOTE
Current hemoglobin:9.8mg/dL  Treatment:  Outpatient Mircera  Transfuse for hemoglobin less than 7.0 per primary service

## 2025-05-17 NOTE — SEPSIS NOTE
Sepsis Note   Asad Galloway 65 y.o. male MRN: [id number]  Unit/Bed#: ICU 03 Encounter: [id number]       Initial Sepsis Screening       Row Name 05/17/25 0559                Is the patient's history suggestive of a new or worsening infection? Yes (Proceed)  -TQ        Suspected source of infection suspect infection, source unknown  -TQ        Indicate SIRS criteria Hyperthemia > 38.3C (100.9F) OR Hypothermia <36C (96.8F)  -TQ        Are two or more of the above signs & symptoms of infection both present and new to the patient? No  -TQ                  User Key  (r) = Recorded By, (t) = Taken By, (c) = Cosigned By      Initials Name Provider Type    TQ Willard Rodriguez MD Resident                      Body mass index is 34.77 kg/m².  Wt Readings from Last 1 Encounters:   05/17/25 107 kg (235 lb 7.2 oz)     IBW (Ideal Body Weight): 70.7 kg

## 2025-05-17 NOTE — ED ATTENDING ATTESTATION
5/17/2025  I, Awais Machado MD, saw and evaluated the patient. I have discussed the patient with the resident/non-physician practitioner and agree with the resident's/non-physician practitioner's findings, Plan of Care, and MDM as documented in the resident's/non-physician practitioner's note, except where noted. All available labs and Radiology studies were reviewed.  I was present for key portions of any procedure(s) performed by the resident/non-physician practitioner and I was immediately available to provide assistance.       At this point I agree with the current assessment done in the Emergency Department.  I have conducted an independent evaluation of this patient a history and physical is as follows: Patient is a 65 year old male who was found unresponsive by family tonight. Patient has a life vest. Was last seen normal at 2200 tonight. Reportedly fell a few days ago and hit his head. Was not seen for this. Patient is on eliquis. Patient is on hemodialysis. Medic call by Dr. Curry under my supervision. Patient needed our immediate attention and could not provide hx. Was last seen in this ED on 5/6/25 for visit for wound check. [employer] website checked on this patient and last Rx filled was on 12/30/23 for ativan for 25 day supply. Normocephalic. Pupils 2-3 mm and reactive. No JVD. Breath sounds diminished. Bradycardia with murmur. Abdomen soft and no apparent tenderness. No LE edema. No rash noted. Unresponsive. DDx including but not limited to: metabolic abnormality, intracranial etiology, cardiac etiology, hypercarbia, hypoxia, substance abuse, infectious etiology, thyroid disease, hyperammonemia, delirium, dementia, overmedication, traumatic injuries. IV narcan given without improvement.  Will check EKG, labs, CXR and CTs. Patient intubated due to GCS of 3. Will need admission to unit.     ED Course   Patient intubated. Patient admitted to ICU.       Critical Care Time  Procedures Critical Care  Time Statement: Upon my evaluation, this patient had a high probability of imminent or life-threatening deterioration due to AMS, hypothermia, hypoventilation, which required my direct attention, intervention, and personal management.  I spent a total of 35 minutes directly providing critical care services, including interpretation of complex medical databases, evaluating for the presence of life-threatening injuries or illnesses, management of organ system failure(s) , complex medical decision making (to support/prevent further life-threatening deterioration)., and ventilator management. This time is exclusive of procedures, teaching, treating other patients, family meetings, and any prior time recorded by providers other than myself.

## 2025-05-17 NOTE — LETTER
[facility] JUL[facility] 3RD  FLOOR MED SURG UNIT  [address]  [place] [address]  Dept: [phone]    May 21, 2025     Patient: Asad Galloway   YOB: 1960   Date of Visit: 5/17/2025       To Whom it May Concern:    Asad Galloway is under my professional care. He was seen in the hospital from 5/17/2025 to 05/21/25. His wife, Eliana Galloway, was present at bedside during hospitalization.     If you have any questions or concerns, please don't hesitate to call.         Sincerely,          Aniyah Kaur, DO

## 2025-05-17 NOTE — PLAN OF CARE
Post-Dialysis RN Treatment Note    Blood Pressure:  Pre:  111/54 mm/Hg  Post:  118/50 mmHg   EDW:  TBD   Weight:  Pre:  98.9 kg   Post:  96.9  kg   Mode of weight measurement: Bed Scale   Volume Removed:   2000 ml    Treatment duration:  210 minutes    NS given:  No    Treatment shortened No   Medications given during Rx: Norepinephrine    Estimated Kt/V:  Not Applicable   Access type: AV fistula   Needle Gauge: 15 gauge   Access Issues: No    Report called to primary nurse:   Yessenia Peterson     Problem: METABOLIC, FLUID AND ELECTROLYTES - ADULT  Goal: Electrolytes maintained within normal limits  Description: INTERVENTIONS:  - Monitor labs and assess patient for signs and symptoms of electrolyte imbalances  - Administer electrolyte replacement as ordered  - Monitor response to electrolyte replacements, including repeat lab results as appropriate  - Instruct patient on fluid and nutrition as appropriate  Outcome: Progressing  Goal: Fluid balance maintained  Description: INTERVENTIONS:  - Monitor labs   - Monitor I/O and WT  - Instruct patient on fluid and nutrition as appropriate  - Assess for signs & symptoms of volume excess or deficit  Outcome: Progressing

## 2025-05-17 NOTE — ASSESSMENT & PLAN NOTE
Unresponsive on arrival to the ED. Intubated for airway protection and for hypoxia.    -Mechanical ventilation volume control    14/500/100/6  -SBT and extubation as appropriate

## 2025-05-17 NOTE — ASSESSMENT & PLAN NOTE
Volume:hypervolemic   Blood pressure: Hypotensive, BP 91/42  Concern of sepsis   Vasopressors as per ICU team

## 2025-05-17 NOTE — CONSULTS
NEPHROLOGY HOSPITAL CONSULTATION   Asad Galloway 65 y.o. male MRN: [id number]  Unit/Bed#: ICU 03 Encounter: [id number]    Brief History of Admission -  65 y.o. man  with PMH of ESRD on HD MWF  via AV fistula, anemia, hypertension, CHF, ischemic cardiomyopathy present unresponsive. Nephrolgy is consulted for management of ESRD    Assessment & Plan  ESRD on dialysis (HCC)  Lab Results   Component Value Date    EGFR 11 05/17/2025    EGFR 11 05/17/2025    EGFR 13 05/06/2025    CREATININE 4.94 (H) 05/17/2025    CREATININE 5.02 (H) 05/17/2025    CREATININE 4.27 (H) 05/06/2025   #ESRD on HD MWF:  [facility]   Access:AV fistula  EDW 95 kg, unable to achieve dry weight as per family  Renal Diet  Fluid restriction 1-1.5L/d  Adjust medications to GFR<10  Avoid opioids   Patent is chronically hypotensive , currently on NE. Will attempt iHD . If he doesn't tolerate then we will need to do CRRT  Altered mental status  Was found unresponsive  Currently intubated  Management as per ICU team  Sepsis workup as per ICU team, currently on antibiotics  Anemia  Current hemoglobin:9.8mg/dL  Treatment:  Outpatient Mircera  Transfuse for hemoglobin less than 7.0 per primary service    Hypotension  Volume:hypervolemic   Blood pressure: Hypotensive, BP 91/42  Concern of sepsis   Vasopressors as per ICU team    I have reviewed the nephrology recommendations including iHD today with fluid removal, with ICU team , and we are in agreement with renal plan including the information outlined above.    HISTORY OF PRESENT ILLNESS:  Requesting Physician: Beatriz Mueller DO  Reason for Consult: Management of ESRD    Asad Galloway is a 65 y.o. male with PMH of ESRD on HD MWF  via AV fistula, anemia, hypertension, CHF, ischemic cardiomyopathy who was admitted to [facility] after presenting unresponsive. A renal consultation is requested today for assistance in the management of ESRD.    PAST MEDICAL HISTORY:  Past Medical History:    Diagnosis Date    Cerebrovascular accident (CVA) due to thrombosis of left middle cerebral artery (HCC) 07/29/2018    Chronic kidney disease     Coronary artery disease     Diabetes mellitus (HCC)     not on meds    Dialysis patient ([facility])     M-W-F    Fistula     left upper arm for hemodialyis    GERD (gastroesophageal reflux disease)     History of coronary artery stent placement     x3    Hypercholesteremia     Hyperlipidemia     Hypertension     Infectious viral hepatitis     B as child    Limb alert care status     no BP/IV left arm    Neuropathy     Nonhealing ulcer of heel (HCC) 04/25/2023    Obesity     Osteomyelitis ([facility])     last assessed 11/4/16-per son not currently    Penetrating foot wound, left, initial encounter 01/19/2022    PVC's (premature ventricular contractions)     sees  Cardio    Stroke ([facility])     last weeof July 2018 [facility]    TIA (transient ischemic attack) 10/28/2018    Ulcer of left heel, limited to breakdown of skin ([facility]) 06/13/2024    Wears dentures     Wears glasses        PAST SURGICAL HISTORY:  Past Surgical History:   Procedure Laterality Date    ABDOMINAL SURGERY      CARDIAC CATHETERIZATION N/A 05/02/2022    Procedure: Cardiac Coronary Angiogram;  Surgeon: Sam Davis MD;  [facility]: [facility];  Service: Cardiology    CARDIAC CATHETERIZATION N/A 05/02/2022    Procedure: Cardiac pci;  Surgeon: Sam Davis MD;  [facility]: [facility]ARDIAC [facility];  Service: Cardiology    CARDIAC CATHETERIZATION  02/01/2023    Procedure: Cardiac catheterization;  Surgeon: Sam Davis MD;  Location: [facility] CARDIAC CATH LAB;  Service: Cardiology    CARDIAC CATHETERIZATION N/A 02/01/2023    Procedure: Cardiac pci;  Surgeon: Sam Davis MD;  Location: [facility] CARDIAC [facility];  Service: Cardiology    CARDIAC CATHETERIZATION N/A 02/01/2023    Procedure: Cardiac Coronary Angiogram;  Surgeon: Sam Davis MD;  Location: [facility] CARDIAC [facility];  Service: Cardiology    CARDIAC CATHETERIZATION  "N/A 02/01/2023    Procedure: Cardiac other-IVUS;  Surgeon: Sam Davis MD;  Location: [facility] CARDIAC C[facility];  Service: Cardiology    CHOLECYSTECTOMY      Percutaneous    COLONOSCOPY      CORONARY ANGIOPLASTY WITH STENT PLACEMENT      CYSTOSCOPY      HEMODIALYSIS ADULT  1/22/2024    HEMODIALYSIS ADULT  01/24/2024    IR LOWER EXTREMITY ANGIOGRAM  9/29/2023    IR LOWER EXTREMITY ANGIOGRAM  02/26/2024    OTHER SURGICAL HISTORY      \"stimulator to control bowel movements\"    AK ESOPHAGOGASTRODUODENOSCOPY TRANSORAL DIAGNOSTIC N/A 09/27/2016    Procedure: ESOPHAGOGASTRODUODENOSCOPY (EGD);  Surgeon: Adele Rowe MD;  Location: [facility] GI LAB;  Service: Gastroenterology    AK LAPAROSCOPY SURG CHOLECYSTECTOMY N/A 02/29/2016    Procedure: LAPAROSCOPIC CHOLECYSTECTOMY ;  Surgeon: Cliff Roman DO;  Location: [facility] Main [facility];  Service: General    AK SLCTV CATHJ 3RD+ ORD SLCTV ABDL PEL/LXTR BRNCH Left 9/29/2023    Procedure: Left leg angiogram with intervention;  Surgeon: Michelel Galavn MD;  Location: [facility] MAIN [facility];  Service: Vascular    AK SLCTV CATHJ 3RD+ ORD SLCTV ABDL PEL/LXTR BRNCH Left 02/26/2024    Procedure: Left leg angiogram antegrade access, left popliteal angioplasty;  Surgeon: Michelle Galvan MD;  Location: [facility] MA[facility];  Service: Vascular    ROTATOR CUFF REPAIR Right     SPINAL CORD STIMULATOR IMPLANT      \"Medtronic interstim model # 3058- in lower back to control bowel movements-currently turned off-battery is dead\"    TOE AMPUTATION Right 10/28/2016    Procedure: 3RD TOE AMPUTATION ;  Surgeon: Anjel Salas DPM;  Location: [facility];  Service:        ALLERGIES:  Allergies[1]    SOCIAL HISTORY:  Social History     Substance and Sexual Activity   Alcohol Use Never     Social History     Substance and Sexual Activity   Drug Use No     Tobacco Use History[2]    FAMILY HISTORY:  Family History   Problem Relation Age of Onset    Leukemia Mother     Liver disease Mother     Lung cancer Mother         heavy smoker " - 3 ppd    Heart disease Father     Liver disease Father     Diabetes type I Father     Multiple myeloma Sister     Breast cancer Sister     Urolithiasis Family     Alcohol abuse Neg Hx     Depression Neg Hx     Drug abuse Neg Hx     Substance Abuse Neg Hx     Mental illness Neg Hx        MEDICATIONS:  Current Medications[3]    REVIEW OF SYSTEMS:  Unable to asses partient is intubated     PHYSICAL EXAM:  Current Weight: Weight - Scale: 107 kg (235 lb 10.8 oz)  First Weight: Weight - Scale: 107 kg (235 lb 7.2 oz)  Vitals:    05/17/25 0648 05/17/25 0700 05/17/25 0800 05/17/25 0808   BP: (!) 116/49 (!) 102/46 (!) 100/42 (!) 91/42   BP Location:       Pulse: 67 59 62 59   Resp: 18 (!) 27 (!) 26 22   Temp: 97.7 °F (36.5 °C) 97.7 °F (36.5 °C) 98.6 °F (37 °C) 98.8 °F (37.1 °C)   TempSrc: Bladder      SpO2: 100% 100% 100% 99%   Weight:       Height:           Intake/Output Summary (Last 24 hours) at 5/17/2025 0812  Last data filed at 5/17/2025 0800  Gross per 24 hour   Intake 367.59 ml   Output --   Net 367.59 ml     Physical Exam  General:  intubated   Skin:  No acute rash  Eyes:  No scleral icterus and noninjected  ENT:  mucous membranes moist  Neck:  no carotid bruits  Chest:  mechanical breath sounds bilaterally   CVS:  Regular rate and rhythm without rub   Abdomen:  soft and nontender   Extremities: lower extremity edema  Neuro:  No gross focality  Psych:  Alert , cooperative   Vascular access: left AVF      Invasive Devices:      Lab Results:   Results from last 7 days   Lab Units 05/17/25  0502 05/17/25  0334 05/17/25  0305 05/14/25  1137   WBC Thousand/uL 6.11 4.96  --  5.29   HEMOGLOBIN g/dL 9.8* 9.7*  --  9.7*   I STAT HEMOGLOBIN g/dl  --   --  11.2*  --    HEMATOCRIT % 31.0* 31.1*  --  31.5*   HEMATOCRIT, ISTAT %  --   --  33*  --    PLATELETS Thousands/uL 95* 91*  --  99*   POTASSIUM mmol/L 5.2 5.0  --   --    CHLORIDE mmol/L 93* 92*  --   --    CO2 mmol/L 29 28  --   --    CO2, I-STAT mmol/L  --   --  27  --   "  BUN mg/dL 32* 32*  --   --    CREATININE mg/dL 4.94* 5.02*  --   --    CALCIUM mg/dL 7.7* 7.8*  --   --    MAGNESIUM mg/dL 2.1 2.1  --   --    PHOSPHORUS mg/dL 5.5*  --   --   --    ALK PHOS U/L 124* 131*  --   --    ALT U/L 8 8  --   --    AST U/L 11* 11*  --   --    GLUCOSE, ISTAT mg/dl  --   --  152*  --      Other Studies:     Portions of the record may have been created with voice recognition software. Occasional wrong word or \"sound a like\" substitutions may have occurred due to the inherent limitations of voice recognition software. Read the chart carefully and recognize, using context, where substitutions have occurred.If you have any questions, please contact the dictating provider.  Management of ESRD         [1] No Known Allergies  [2]   Social History  Tobacco Use   Smoking Status Never   Smokeless Tobacco Never   [3]   Current Facility-Administered Medications:     cefepime (MAXIPIME) 1,000 mg in dextrose 5 % 50 mL IVPB, 1,000 mg, Intravenous, Q24H, BRITTANY Durbin, Last Rate: 100 mL/hr at 05/17/25 0619, 1,000 mg at 05/17/25 0619    chlorhexidine (PERIDEX) 0.12 % oral rinse 15 mL, 15 mL, Mouth/Throat, Q12H Willard EPPS MD    fentaNYL injection 50 mcg, 50 mcg, Intravenous, Q4H PRN, Willard Rodriguez MD, 50 mcg at 05/17/25 0618    heparin (porcine) subcutaneous injection 5,000 Units, 5,000 Units, Subcutaneous, [facility], 5,000 Units at 05/17/25 0535    NOREPINEPHRINE 4 MG  ML NSS (CMPD ORDER) infusion, 1-30 mcg/min, Intravenous, Titrated, Willard Rodriguez MD, Last Rate: 7.5 mL/hr at 05/17/25 0649, 2 mcg/min at 05/17/25 0649    NxStage K 2/Ca 3 dialysis solution (RFP-400) 20,000 mL, 20,000 mL, Dialysis, Continuous, Joselyn Reyes Bahamonde, MD    propofol (DIPRIVAN) 1000 mg in 100 mL infusion (premix), 5-50 mcg/kg/min, Intravenous, Titrated, Willard Rodriguez MD, Stopped at 05/17/25 0456    ROCuronium (ZEMURON) injection 100 mg, 100 mg, Intravenous, Once, Isidro Curry, DO    " vancomycin (VANCOCIN) 2,000 mg in sodium chloride 0.9 % 500 mL IVPB, 2,000 mg, Intravenous, Once, BRITTANY Durbin, Last Rate: 250 mL/hr at 05/17/25 0654, 2,000 mg at 05/17/25 0654

## 2025-05-17 NOTE — ASSESSMENT & PLAN NOTE
Presented to the ED via EMS after being found unresponsive by family at home. Unknown amount of downtime. Noted to be on HD 3x per week which recently switched to home HD with less removal of fluid. Usually has 4.5-5 L removed and now only about 1 L removed per session. Dry weight is 94 kg as per family and is 100 kg in ED. Elevated lactate.    -Intubated for airway protection  -CTH pending  -CT chest/a/p pending  -Septic workup started  -Trend lactate

## 2025-05-17 NOTE — ASSESSMENT & PLAN NOTE
Was found unresponsive  Currently intubated  Management as per ICU team  Sepsis workup as per ICU team, currently on antibiotics

## 2025-05-17 NOTE — RESPIRATORY THERAPY NOTE
05/17/25 0744   Respiratory Assessment   Resp Comments placed on PS 6 peep6   Vent Information   Vent type     Vent Mode CPAP/PS Spont   CPAP/PS Spont Settings   FIO2 (%) 60 %   PEEP (cmH2O) 6 cmH2O   Pressure Support (cmH2O) 6 cmH20   Trigger Sensitivity Flow (lpm) 3 LPM   Esens % 25 %   Humidification Heater   CPAP/PS Spont Actuals   Resp Rate (BPM) 13 BPM   VT (mL) 272 mL   MV (Obs) 4.3   MAP (cmH2O) 8.1 cmH2O   Peak Pressure (cmH2O) 8.1 cmH2O   I/E Ratio (Obs) 1:2.1   RSBI 65   CPAP/PS Spont Alarms   High Peak Pressure (cmH20) 40 cmH2O   High Resp Rate (BPM) 40 BPM   High MV (L/min) 16 L/min   Low MV (L/min) 3.5 L/min   High Angelina VTE (mL) 900 mL   Low Angelina VTE (mL) 250 mL   High SPONT VTE (mL) 900 mL   Low Spont VTE (mL) 250 mL   CPAP/PS Spont Apnea Settings   Resp Rate (BPM) 15 BPM   VT (mL) 500 mL   FIO2 (%) 100 %   Apnea Time (s) 60 S   Apnea Flow (LPM) 60 LPM

## 2025-05-17 NOTE — CONSULTS
Consult - Cardiology   Asad Galloway 65 y.o. male MRN: [id number]  Unit/Bed#: ICU 03 Encounter: [id number]  ASSESSMENT:  1. Unresponsive episode  2. Acute volume overload secondary to inadequate HD  3. Multivessel CAD s/p PCI in March   4. Ischemic cardiomyopathy, EF 30-35% with WCD  5. Severe AS, receiving TAVR workup at [facility]   6. Hx of DVT on Eliquis   7. ESRD on HD  8. Elevated troponin secondary to #2 and #7    PLAN/ DISCUSSION:     Asad is currently intubated and sedated.  Troponins found at 88>76>43 respectively. His EKG/telemetry have not shown any significant abnormalities thus far to suggest primary cardiac event   Will order repeat echo (was already scheduled in the outpatient setting) to assess for any interval changes.   Will interrogate patients Zoll LifeVest to evaluate for arrhythmias.   Continue telemetry monitoring as inpatient.   Volume management per nephrology         History of Present Illness:  Asad Galloway is a 65 year old male with medical history of obesity (BMI 30), ESRD on HD (MWF since 2020, L arm fistula, thrombocytopenia, HTN, HLD, IDDM 2, CVA in 2018 with residual cognitive deficits, LLE DVT 7/2024 (off Eliquis), HFrEF, 35% as of 1/2025, CAD with multivessel disease now s/p multiple PCI most recently LAD PCI 3/6/25, mod MR/TR, severe AS who presents with unresponsive episode. History obtained via chart review and family (wife at bedside, son val vis telephone). Was in his usual health when he was found unresponsive by his wife who went into another room briefly. She attepted to wake him but was unsuccessful. Called for her son who called 911. She denied his complaint of any prodromal symptoms. He was noted with increased shortness of breath and weight gain over the past 1 week. Denies any chest apin or discomfort. Reports we was being transitioned to home HD and was being trained by his HD staff. Son reports they were not taking enough fluid off, noted LE edema  and about 15lbs weight gain since then. He was wearing LifeVest during the episode. Per family no shocks were delivered.     Past Medical History:        Past Medical History:   Diagnosis Date    Cerebrovascular accident (CVA) due to thrombosis of left middle cerebral artery ([facility]) 07/29/2018    Chronic kidney disease     Coronary artery disease     Diabetes mellitus ([facility])     not on meds    Dialysis patient ([facility])     M-W-F    Fistula     left upper arm for hemodialyis    GERD (gastroesophageal reflux disease)     History of coronary artery stent placement     x3    Hypercholesteremia     Hyperlipidemia     Hypertension     Infectious viral hepatitis     B as child    Limb alert care status     no BP/IV left arm    Neuropathy     Nonhealing ulcer of heel ([facility]) 04/25/2023    Obesity     Osteomyelitis ([facility])     last assessed 11/4/16-per son not currently    Penetrating foot wound, left, initial encounter 01/19/2022    PVC's (premature ventricular contractions)     sees  Cardio    Stroke ([facility])     last weeof July 2018 [facility]    TIA (transient ischemic attack) 10/28/2018    Ulcer of left heel, limited to breakdown of skin ([facility]) 06/13/2024    Wears dentures     Wears glasses       Past Surgical History:   Procedure Laterality Date    ABDOMINAL SURGERY      CARDIAC CATHETERIZATION N/A 05/02/2022    Procedure: Cardiac Coronary Angiogram;  Surgeon: Sam Davis MD;  [facility];  Service: Cardiology    CARDIAC CATHETERIZATION N/A 05/02/2022    Procedure: Cardiac pci;  Surgeon: Sam Davis MD;  [facility];  Service: Cardiology    CARDIAC CATHETERIZATION  02/01/2023    Procedure: Cardiac catheterization;  Surgeon: Sam Davis MD;  [facility]: [facility];  Service: Cardiology    CARDIAC CATHETERIZATION N/A 02/01/2023    Procedure: Cardiac pci;  Surgeon: Sam Davis MD;  [facility];  Service: Cardiology    CARDIAC CATHETERIZATION N/A  "02/01/2023    Procedure: Cardiac Coronary Angiogram;  Surgeon: Sam Davis MD;  [facility];  Service: Cardiology    CARDIAC CATHETERIZATION N/A 02/01/2023    Procedure: Cardiac other-IVUS;  Surgeon: Sam Davis MD;  [facility];  Service: Cardiology    CHOLECYSTECTOMY      Percutaneous    COLONOSCOPY      CORONARY ANGIOPLASTY WITH STENT PLACEMENT      CYSTOSCOPY      HEMODIALYSIS ADULT  1/22/2024    HEMODIALYSIS ADULT  01/24/2024    IR LOWER EXTREMITY ANGIOGRAM  9/29/2023    IR LOWER EXTREMITY ANGIOGRAM  02/26/2024    OTHER SURGICAL HISTORY      \"stimulator to control bowel movements\"    NJ ESOPHAGOGASTRODUODENOSCOPY TRANSORAL DIAGNOSTIC N/A 09/27/2016    Procedure: ESOPHAGOGASTRODUODENOSCOPY (EGD);  Surgeon: Adele Rowe MD;  Location: [facility] GI LAB;  Service: Gastroenterology    NJ LAPAROSCOPY SURG CHOLECYSTECTOMY N/A 02/29/2016    Procedure: LAPAROSCOPIC CHOLECYSTECTOMY ;  Surgeon: Cliff Roman DO;  Location: [facility];  Service: General    N[place] SLCTV CATHJ 3RD+ ORD SLCTV ABDL PEL/LXTR BRNCH Left 9/29/2023    Procedure: Left leg angiogram with intervention;  Surgeon: Michelle Galvan MD;  Location: [facility];  Service: Vascular    NJ SLCTV CATHJ 3RD+ ORD SLCTV ABDL PEL/LXTR BRNCH Left 02/26/2024    Procedure: Left leg angiogram antegrade access, left popliteal angioplasty;  Surgeon: Michelle Galvan MD;  Location: [facility];  Service: Vascular    ROTATOR CUFF REPAIR Right     SPINAL CORD STIMULATOR IMPLANT      \"Medtronic interstim model # 3058- in lower back to control bowel movements-currently turned off-battery is dead\"    TOE AMPUTATION Right 10/28/2016    Procedure: 3RD TOE AMPUTATION ;  Surgeon: Anjel Salas DPM;  Location: [facility];  Service:         Allergy:        Allergies[1]    Medications:       Prior to Admission medications    Medication Sig Start Date End Date Taking? Authorizing Provider   apixaban (Eliquis) 2.5 mg Take 1 tablet (2.5 mg " total) by mouth 2 (two) times a day 4/8/25   Dayanara Holbrook PA-C   aspirin 81 mg chewable tablet Chew 1 tablet daily    Historical Provider, MD   atorvastatin (LIPITOR) 40 mg tablet Take 1 tablet (40 mg total) by mouth daily with dinner 3/20/25   Britney Tan MD   metoprolol succinate (TOPROL-XL) 25 mg 24 hr tablet TAKE 1 TABLET BY MOUTH TWICE A DAY 3/10/25   Britney Tan MD   midodrine (PROAMATINE) 10 MG tablet Take 1 tablet (10 mg total) by mouth Before Dialysis (before dialysis) 4/24/25   Aniyah Guerrero DO   prasugrel (EFFIENT) tablet Take 1 tablet (10 mg total) by mouth daily 3/20/25   Britney Tan MD   sacubitril-valsartan (ENTRESTO) 24-26 MG TABS Take 1 tablet by mouth daily after dinner    Historical Provider, MD   Sevelamer Carbonate 2.4 g Take 1 packet by mouth Three times a day 1/14/25 1/13/26  Historical Provider, MD   Vitamin D3 1.25 MG (07895 UT) capsule TAKE 1 CAPSULE BY MOUTH ONE TIME PER WEEK 4/17/24   Mary Torres MD       Family History:     Family History   Problem Relation Age of Onset    Leukemia Mother     Liver disease Mother     Lung cancer Mother         heavy smoker - 3 ppd    Heart disease Father     Liver disease Father     Diabetes type I Father     Multiple myeloma Sister     Breast cancer Sister     Urolithiasis Family     Alcohol abuse Neg Hx     Depression Neg Hx     Drug abuse Neg Hx     Substance Abuse Neg Hx     Mental illness Neg Hx         Social History:       Social History     Socioeconomic History    Marital status: /Civil Union     Spouse name: None    Number of children: None    Years of education: None    Highest education level: Not asked   Occupational History    Occupation: disabled   Tobacco Use    Smoking status: Never    Smokeless tobacco: Never   Vaping Use    Vaping status: Never Used   Substance and Sexual Activity    Alcohol use: Never    Drug use: No    Sexual activity: Not Currently     Partners: Female     Comment:  defer   Other Topics Concern    None   Social History Narrative    Daily caffeine consumption 2-3 servings a day     Social Drivers of Health     Financial Resource Strain: Patient Declined (7/13/2023)    Overall Financial Resource Strain (CARDIA)     Difficulty of Paying Living Expenses: Patient declined   Food Insecurity: No Food Insecurity (4/17/2025)    Nursing - Inadequate Food Risk Classification     Worried About Running Out of Food in the Last Year: Never true     Ran Out of Food in the Last Year: Never true     Ran Out of Food in the Last Year: Never true   Transportation Needs: No Transportation Needs (4/26/2025)    OASIS : Transportation     Lack of Transportation (Medical): No     Lack of Transportation (Non-Medical): No     Patient Unable or Declines to Respond: No   Physical Activity: Not on file   Stress: No Stress Concern Present (1/18/2019)    [employer] [place] [employer] - Occupational Stress Questionnaire     Feeling of Stress : Only a little   Social Connections: Unknown (1/18/2019)    Social Connection and Isolation Panel     Frequency of Communication with Friends and Family: Not on file     Frequency of Social Gatherings with Friends and Family: Not on file     Attends Buddhism Services: Not on file     Active Member of Clubs or Organizations: Not on file     Attends Club or Organization Meetings: Not on file     Marital Status:    Intimate Partner Violence: Patient Unable To Answer (4/17/2025)    Nursing IPS     Feels Physically and Emotionally Safe: Not on file     Physically Hurt by Someone: Not on file     Humiliated or Emotionally Abused by Someone: Not on file     Physically Hurt by Someone: Patient unable to answer     Hurt or Threatened by Someone: Patient unable to answer   Housing Stability: Unknown (4/17/2025)    Nursing: Inadequate Housing Risk Classification     Has Housing: Not on file     Worried About Losing Housing: Not on file     Unable to Get  Utilities: Not on file     Unable to Pay for Housing in the Last Year: No     Has Housin       Weights/BMI:    Wt Readings from Last 3 Encounters:   25 107 kg (235 lb 10.8 oz)   25 94.3 kg (208 lb)   25 94.8 kg (209 lb)   , Body mass index is 34.8 kg/m².      ROS:  14 point ROS negative except as outlined above  Remainder review of systems is negative    Exam:  Head: Normocephalic, atraumatic.  Eyes: PERRLA. No icterus. Normal Conjunctiva.   Oropharynx: Moist and normal-appearing mucosa  Neck: Supple, symmetrical, trachea midline, JVD not appreciated.   Heart: RRR, no murmur, rub or gallop, S1 & S2 normal   Lungs: Normal air entry, lungs clear to auscultation and no rales, rhonchi or wheezing   Abdomen: Flat, normal findings: bowel sounds normal and soft, non-tender  Lower Limbs:  No pitting edema, 2+ peripheral pulses, capillary refill within normal limits  Musculoskeletal: ROM grossly normal                 [1] No Known Allergies

## 2025-05-17 NOTE — ED PROVIDER NOTES
Time reflects when diagnosis was documented in both MDM as applicable and the Disposition within this note       Time User Action Codes Description Comment    5/17/2025  3:54 AM Isidro Curry Add [G93.40] Acute encephalopathy     5/17/2025  3:54 AM Isidro Curry [R41.82] Altered mental status     5/17/2025  4:02 AM Isidro Curry Add [T68.XXXA] Hypothermia, initial encounter     5/17/2025  4:02 AM Isidro Curry Add [D63.8] Anemia of chronic disease     5/17/2025  4:03 AM Isidro Curry Add [J96.01] Acute respiratory failure with hypoxia (HCC)     5/17/2025  4:03 AM Isidro Curry Modify [G93.40] Acute encephalopathy     5/17/2025  4:03 AM Isidro Curry Modify [J96.01] Acute respiratory failure with hypoxia (HCC)     5/17/2025  4:04 AM Isidro Curry Add [I50.20] HFrEF (heart failure with reduced ejection fraction) ([facility])     5/17/2025  4:04 AM Isidro Curry Add [Z95.810] Presence of external cardiac defibrillator     5/17/2025  4:18 AM Willard Rodriguez Add [N18.6,  Z99.2] ESRD on dialysis ([facility])     5/17/2025  4:19 AM Willard Rodriguez Add [J96.01] Acute hypoxic respiratory failure ([facility])     5/17/2025  4:28 AM Isidro Curry Add [R79.89] Elevated TSH     5/17/2025  4:29 AM Isidro Curry Add [R79.89] Elevated brain natriuretic peptide (BNP) level     5/17/2025  4:29 AM Isidro Curry Add [R79.89] Elevated troponin     5/17/2025  4:55 AM Isidro Curry Add [E87.20] Lactic acidosis     5/17/2025  5:26 AM Beatriz Leung Add [Z86.73] History of stroke in adulthood     5/17/2025  5:26 AM GiBeatriz villalpando Add [R65.10] SIRS (systemic inflammatory response syndrome) ([facility])     5/17/2025  6:19 AM Isidro Curry [J81.1] Pulmonary edema     5/17/2025  6:22 AM Isidro Curry [K74.60] Cirrhosis of liver (HCC)           ED Disposition       ED Disposition   Admit    Condition   Critical    Date/Time   Sat May 17, 2025  6:25 AM    Comment   Case was discussed with  Critical Care and the patient's admission status was agreed to be Admission Status: inpatient status to the service of Dr. Mueller .               Assessment & Plan       Medical Decision Making  Differential diagnosis includes but not limited to: Metabolic abnormality, CHF exacerbation, intracranial process, infectious process, cardiac etiology, hypercarbic encephalopathy, metabolic encephalopathy, respiratory failure    Asad brought to the ER via EMS after being found unresponsive.  On arrival to the ER he was GCS 3 and intubated for airway protection by Dr. Stafford, see his note for details of that procedure.  Lab work drawn and CT head, C-spine, chest abdomen pelvis without contrast ordered due to patient's ESRD.  Critical care was contacted and they came down to evaluate the patient as workup was concluding.  Lab work significant for lactic acidosis, elevated BNP, however at patient's baseline, elevated troponin, multiple other lab abnormalities.  CT head and C-spine negative for any acute abnormalities.  CT chest abdomen pelvis showed pulmonary edema, cirrhotic changes of the liver.  Otherwise chronic findings.  See CT report for full details.  Family was updated and confirmed level 1 CODE STATUS.  Due to patient name intubated, critical care was consulted and they accept the patient for admission under their service for further evaluation and management.  Patient admitted in stable condition.    Amount and/or Complexity of Data Reviewed  Labs: ordered.  Radiology: ordered and independent interpretation performed.    Risk  Prescription drug management.  Decision regarding hospitalization.             Medications   ROCuronium (ZEMURON) injection 100 mg ( Intravenous Canceled Entry 5/17/25 0317)   naloxone (NARCAN) injection 0.1 mg (0.4 mg Intravenous Given 5/17/25 0306)   naloxone (NARCAN) injection 2 mg (2 mg Intravenous Given 5/17/25 0308)   etomidate (AMIDATE) 2 mg/mL injection (20 mg Intravenous Given  5/17/25 0314)   ROCuronium (ZEMURON) injection (50 mg Intravenous Given 5/17/25 0317)       ED Risk Strat Scores   HEART Risk Score      Flowsheet Row Most Recent Value   Heart Score Risk Calculator    History 0 Filed at: 05/17/2025 0432   ECG 1 Filed at: 05/17/2025 0432   Age 2 Filed at: 05/17/2025 0432   Risk Factors 2 Filed at: 05/17/2025 0432   Troponin 2 Filed at: 05/17/2025 0432   HEART Score 7 Filed at: 05/17/2025 0432          HEART Risk Score      Flowsheet Row Most Recent Value   Heart Score Risk Calculator    History 0 Filed at: 05/17/2025 0432   ECG 1 Filed at: 05/17/2025 0432   Age 2 Filed at: 05/17/2025 0432   Risk Factors 2 Filed at: 05/17/2025 0432   Troponin 2 Filed at: 05/17/2025 0432   HEART Score 7 Filed at: 05/17/2025 0432                      No data recorded                            History of Present Illness       Chief Complaint   Patient presents with    Altered Mental Status     Pt unresponsive found by family. Unknown down time. BP 70/ palp. Dialysis pt. Life vest.        Past Medical History:   Diagnosis Date    Cerebrovascular accident (CVA) due to thrombosis of left middle cerebral artery (HCC) 07/29/2018    Chronic kidney disease     Coronary artery disease     Diabetes mellitus (HCC)     not on meds    Dialysis patient (HCC)     M-W-F    Fistula     left upper arm for hemodialyis    GERD (gastroesophageal reflux disease)     History of coronary artery stent placement     x3    Hypercholesteremia     Hyperlipidemia     Hypertension     Infectious viral hepatitis     B as child    Limb alert care status     no BP/IV left arm    Neuropathy     Nonhealing ulcer of heel (HCC) 04/25/2023    Obesity     Osteomyelitis (HCC)     last assessed 11/4/16-per son not currently    Penetrating foot wound, left, initial encounter 01/19/2022    PVC's (premature ventricular contractions)     sees [facility]    Stroke (HCC)     last weeof July 2018 [facility]    TIA (transient ischemic  "attack) 10/28/2018    Ulcer of left heel, limited to breakdown of skin (HCC) 06/13/2024    Wears dentures     Wears glasses       Past Surgical History:   Procedure Laterality Date    ABDOMINAL SURGERY      CARDIAC CATHETERIZATION N/A 05/02/2022    Procedure: Cardiac Coronary Angiogram;  Surgeon: Sam Davis MD;  [facility];  Service: Cardiology    CARDIAC CATHETERIZATION N/A 05/02/2022    Procedure: Cardiac pci;  Surgeon: Sam Davis MD;  [facility];  Service: Cardiology    CARDIAC CATHETERIZATION  02/01/2023    Procedure: Cardiac catheterization;  Surgeon: Sam Davis MD;  [facility];  Service: Cardiology    CARDIAC CATHETERIZATION N/A 02/01/2023    Procedure: Cardiac pci;  Surgeon: Sam Davis MD;  [facility];  Service: Cardiology    CARDIAC CATHETERIZATION N/A 02/01/2023    Procedure: Cardiac Coronary Angiogram;  Surgeon: Sam Davis MD;  [facility];  Service: Cardiology    CARDIAC CATHETERIZATION N/A 02/01/2023    Procedure: Cardiac other-IVUS;  Surgeon: Sam Davis MD;  [facility];  Service: Cardiology    CHOLECYSTECTOMY      Percutaneous    COLONOSCOPY      CORONARY ANGIOPLASTY WITH STENT PLACEMENT      CYSTOSCOPY      HEMODIALYSIS ADULT  1/22/2024    HEMODIALYSIS ADULT  01/24/2024    IR LOWER EXTREMITY ANGIOGRAM  9/29/2023    IR LOWER EXTREMITY ANGIOGRAM  02/26/2024    OTHER SURGICAL HISTORY      \"stimulator to control bowel movements\"    RI ESOPHAGOGASTRODUODENOSCOPY TRANSORAL DIAGNOSTIC N/A 09/27/2016    Procedure: ESOPHAGOGASTRODUODENOSCOPY (EGD);  Surgeon: Adele Rowe MD;  Location: [facility];  Service: Gastroenterology    RI LAPAROSCOPY SURG CHOLECYSTECTOMY N/A 02/29/2016    Procedure: LAPAROSCOPIC CHOLECYSTECTOMY ;  Surgeon: Cliff Roman DO;  Location: [facility];  Service: General    RI SLCTV CATHJ 3RD+ ORD SLCTV ABDL PEL/LXTR BRNCH Left 9/29/2023    Procedure: Left leg " "angiogram with intervention;  Surgeon: Michelle Galvan MD;  Location: [facility];  Service: Vascular    SD SLCTV CATHJ 3RD+ ORD SLCTV ABDL PEL/LXTR BRNCH Left 02/26/2024    Procedure: Left leg angiogram antegrade access, left popliteal angioplasty;  Surgeon: Michelle Galvan MD;  [facility];  Service: Vascular    ROTATOR CUFF REPAIR Right     SPINAL CORD STIMULATOR IMPLANT      \"Medtronic interstim model # 3058- in lower back to control bowel movements-currently turned off-battery is dead\"    TOE AMPUTATION Right 10/28/2016    Procedure: 3RD TOE AMPUTATION ;  Surgeon: Anjel Salas DPM;  Location[facility];  Service:       Family History   Problem Relation Age of Onset    Leukemia Mother     Liver disease Mother     Lung cancer Mother         heavy smoker - 3 ppd    Heart disease Father     Liver disease Father     Diabetes type I Father     Multiple myeloma Sister     Breast cancer Sister     Urolithiasis Family     Alcohol abuse Neg Hx     Depression Neg Hx     Drug abuse Neg Hx     Substance Abuse Neg Hx     Mental illness Neg Hx       Social History[1]   E-Cigarette/Vaping    E-Cigarette Use Never User       E-Cigarette/Vaping Substances    Nicotine No     THC No     CBD No     Flavoring No     Other No     Unknown No       I have reviewed and agree with the history as documented.     65-year-old male with multiple chronic medical issues including end-stage renal disease on dialysis, CHF with LifeVest, presents unresponsive via EMS.  EMS reports patient was found with a last known well approximately 2200 last night mostly unresponsive with decreased respiratory rate.  He was reportedly hypotensive per EMS on the way over.  On arrival, patient was unresponsive GCS 3 and bradycardic on the monitor.  Patient unable to provide any more history.        Review of Systems   Unable to perform ROS: Patient unresponsive           Objective       ED Triage Vitals   Temperature Pulse Blood " Pressure Respirations SpO2 Patient Position - Orthostatic VS   05/17/25 0324 05/17/25 0300 05/17/25 0300 05/17/25 0324 05/17/25 0300 05/17/25 0302   (!) 90.7 °F (32.6 °C) 62 117/56 (!) 11 (!) 71 % Lying      Temp Source Heart Rate Source BP Location FiO2 (%) Pain Score    05/17/25 0339 -- 05/17/25 0302 -- 05/17/25 0618    Rectal  Right arm  Med Not Given for Pain - for MAR use only      Vitals      Date and Time Temp Pulse SpO2 Resp BP Pain Score FACES Pain Rating User   05/17/25 0618 -- -- -- -- -- Med Not Given for Pain - for MAR use only --    05/17/25 0416 -- -- 98 % -- -- -- --    05/17/25 0340 -- -- 98 % -- -- -- -- ND   05/17/25 0339 96.4 °F (35.8 °C) 62 97 % 18 111/51 -- -- AKASH   05/17/25 0336 94.1 °F (34.5 °C) 57 98 % 22 96/51 -- -- AKASH   05/17/25 0335 93.9 °F (34.4 °C) 56 99 % 19 -- -- -- AKASH   05/17/25 0330 92.7 °F (33.7 °C) 58 -- 17 113/56 -- --    05/17/25 0324 90.7 °F (32.6 °C) 61 78 % 11 109/53 -- -- AKASH   05/17/25 0321 -- 63 79 % -- -- -- --    05/17/25 0318 -- 57 76 % -- -- -- --    05/17/25 0315 -- 60 93 % -- 110/55 -- -- AKASH   05/17/25 0302 -- 60 -- -- 117/56 -- -- AKASH   05/17/25 0300 -- 62 71 % -- 117/56 -- --             Physical Exam  Vitals and nursing note reviewed.   Constitutional:       General: He is in acute distress.      Appearance: He is ill-appearing and toxic-appearing.   HENT:      Head: Normocephalic and atraumatic.      Mouth/Throat:      Mouth: Mucous membranes are moist.     Eyes:      Pupils: Pupils are equal, round, and reactive to light.       Cardiovascular:      Rate and Rhythm: Regular rhythm. Bradycardia present.      Heart sounds: Murmur heard.   Pulmonary:      Effort: Respiratory distress (Agonal breathing) present.   Abdominal:      Palpations: Abdomen is soft.      Tenderness: There is no abdominal tenderness (No apparent tenderness).     Musculoskeletal:      Cervical back: No rigidity.     Skin:     General: Skin is dry.      Coloration: Skin is not  cyanotic.     Neurological:      Mental Status: He is unresponsive.      GCS: GCS eye subscore is 1. GCS verbal subscore is 1. GCS motor subscore is 1.         Results Reviewed       Procedure Component Value Units Date/Time    FLU/RSV/COVID - if FLU/RSV clinically relevant (2hr TAT) [[id number]]  (Normal) Collected: 05/17/25 0515    Lab Status: Final result Specimen: Nares from Nose Updated: 05/17/25 0610     SARS-CoV-2 Negative     INFLUENZA A PCR Negative     INFLUENZA B PCR Negative     RSV PCR Negative    Narrative:      This test has been performed using the CoV-2/Flu/RSV plus assay on the [employer] platform. This test has been validated by the  and verified by the performing laboratory.     This test is designed to amplify and detect the following: nucleocapsid (N), envelope (E), and RNA-dependent RNA polymerase (RdRP) genes of the SARS-CoV-2 genome; matrix (M), basic polymerase (PB2), and acidic protein (PA) segments of the influenza A genome; matrix (M) and non-structural protein (NS) segments of the influenza B genome, and the nucleocapsid genes of RSV A and RSV B.     Positive results are indicative of the presence of Flu A, Flu B, RSV, and/or SARS-CoV-2 RNA. Positive results for SARS-CoV-2 or suspected novel influenza should be reported to state, local, or federal health departments according to local reporting requirements.      All results should be assessed in conjunction with clinical presentation and other laboratory markers for clinical management.     FOR PEDIATRIC PATIENTS - copy/paste COVID Guidelines URL to browser: [email or web address]       Blood culture #1 [[id number]] Collected: 05/17/25 0551    Lab Status: In process Specimen: Blood from Arm, Left Updated: 05/17/25 0557    Lactic acid 2 Hours [[id number]]  (Abnormal) Collected: 05/17/25 0431    Lab Status: Final result Specimen: Blood from Arm, Right Updated: 05/17/25  0453     LACTIC ACID 2.8 mmol/L     Narrative:      Result may be elevated if tourniquet was used during collection.    Ammonia [[id number]]  (Abnormal) Collected: 05/17/25 0431    Lab Status: Final result Specimen: Blood from Arm, Right Updated: 05/17/25 0453     Ammonia 13 umol/L     Blood culture #2 [[id number]] Collected: 05/17/25 0448    Lab Status: In process Specimen: Blood from Arm, Right Updated: 05/17/25 0451    TSH, 3rd generation with Free T4 reflex [[id number]]  (Abnormal) Collected: 05/17/25 0334    Lab Status: Final result Specimen: Blood from Arm, Right Updated: 05/17/25 0425     TSH 3RD GENERATON 12.390 uIU/mL     T4, free [[id number]] Collected: 05/17/25 0334    Lab Status: In process Specimen: Blood from Arm, Right Updated: 05/17/25 0425    HS Troponin 0hr (reflex protocol) [[id number]]  (Abnormal) Collected: 05/17/25 0334    Lab Status: Final result Specimen: Blood from Arm, Right Updated: 05/17/25 0419     hs TnI 0hr 88 ng/L     Procalcitonin [[id number]]  (Normal) Collected: 05/17/25 0334    Lab Status: Final result Specimen: Blood from Arm, Right Updated: 05/17/25 0418     Procalcitonin 0.23 ng/ml     B-Type Natriuretic Peptide(BNP) [[id number]]  (Abnormal) Collected: 05/17/25 0334    Lab Status: Final result Specimen: Blood from Arm, Right Updated: 05/17/25 0414     BNP 3,179 pg/mL     Salicylate level [[id number]]  (Abnormal) Collected: 05/17/25 0334    Lab Status: Final result Specimen: Blood from Arm, Right Updated: 05/17/25 0412     Salicylate Lvl <5 mg/dL     Acetaminophen level-If concentration is detectable, please discuss with medical  on call. [[id number]]  (Abnormal) Collected: 05/17/25 0334    Lab Status: Final result Specimen: Blood from Arm, Right Updated: 05/17/25 0412     Acetaminophen Level <2 ug/mL     Protime-INR [[id number]]  (Abnormal) Collected: 05/17/25 0334    Lab Status: Final result Specimen: Blood from Arm, Right Updated: 05/17/25 0410     Protime 18.5 seconds       INR 1.47    Narrative:      INR Therapeutic Range    Indication                                             INR Range      Atrial Fibrillation                                               2.0-3.0  Hypercoagulable State                                    2.0.2.3  Left Ventricular Asist Device                            2.0-3.0  Mechanical Heart Valve                                  -    Aortic(with afib, MI, embolism, HF, LA enlargement,    and/or coagulopathy)                                     2.0-3.0 (2.5-3.5)     Mitral                                                             2.5-3.5  Prosthetic/Bioprosthetic Heart Valve               2.0-3.0  Venous thromboembolism (VTE: VT, PE        2.0-3.0    APTT [[id number]]  (Normal) Collected: 05/17/25 0334    Lab Status: Final result Specimen: Blood from Arm, Right Updated: 05/17/25 0410     PTT 34 seconds     Comprehensive metabolic panel [[id number]]  (Abnormal) Collected: 05/17/25 0334    Lab Status: Final result Specimen: Blood from Arm, Right Updated: 05/17/25 0408     Sodium 134 mmol/L      Potassium 5.0 mmol/L      Chloride 92 mmol/L      CO2 28 mmol/L      ANION GAP 14 mmol/L      BUN 32 mg/dL      Creatinine 5.02 mg/dL      Glucose 147 mg/dL      Calcium 7.8 mg/dL      AST 11 U/L      ALT 8 U/L      Alkaline Phosphatase 131 U/L      Total Protein 6.6 g/dL      Albumin 3.8 g/dL      Total Bilirubin 0.81 mg/dL      eGFR 11 ml/min/1.73sq m     Narrative:      National Kidney Disease Foundation guidelines for Chronic Kidney Disease (CKD):     Stage 1 with normal or high GFR (GFR > 90 mL/min/1.73 square meters)    Stage 2 Mild CKD (GFR = 60-89 mL/min/1.73 square meters)    Stage 3A Moderate CKD (GFR = 45-59 mL/min/1.73 square meters)    Stage 3B Moderate CKD (GFR = 30-44 mL/min/1.73 square meters)    Stage 4 Severe CKD (GFR = 15-29 mL/min/1.73 square meters)    Stage 5 End Stage CKD (GFR <15 mL/min/1.73 square meters)  Note: GFR calculation is accurate only with  a steady state creatinine    Magnesium [[id number]]  (Normal) Collected: 05/17/25 0334    Lab Status: Final result Specimen: Blood from Arm, Right Updated: 05/17/25 0408     Magnesium 2.1 mg/dL     Lactic acid, plasma (w/reflex if result > 2.0) [[id number]]  (Abnormal) Collected: 05/17/25 0334    Lab Status: Final result Specimen: Blood from Arm, Right Updated: 05/17/25 0408     LACTIC ACID 3.1 mmol/L     Narrative:      Result may be elevated if tourniquet was used during collection.    Ethanol [[id number]]  (Normal) Collected: 05/17/25 0334    Lab Status: Final result Specimen: Blood from Arm, Right Updated: 05/17/25 0407     Ethanol Lvl <10 mg/dL     CBC and differential [[id number]]  (Abnormal) Collected: 05/17/25 0334    Lab Status: Final result Specimen: Blood from Arm, Right Updated: 05/17/25 0359     WBC 4.96 Thousand/uL      RBC 3.10 Million/uL      Hemoglobin 9.7 g/dL      Hematocrit 31.1 %       fL      MCH 31.3 pg      MCHC 31.2 g/dL      RDW 17.0 %      MPV 11.9 fL      Platelets 91 Thousands/uL      nRBC 0 /100 WBCs      Segmented % 67 %      Immature Grans % 1 %      Lymphocytes % 19 %      Monocytes % 10 %      Eosinophils Relative 3 %      Basophils Relative 0 %      Absolute Neutrophils 3.37 Thousands/µL      Absolute Immature Grans 0.03 Thousand/uL      Absolute Lymphocytes 0.92 Thousands/µL      Absolute Monocytes 0.47 Thousand/µL      Eosinophils Absolute 0.15 Thousand/µL      Basophils Absolute 0.02 Thousands/µL     Blood gas, venous [[id number]]  (Abnormal) Collected: 05/17/25 0334    Lab Status: Final result Specimen: Blood from Arm, Right Updated: 05/17/25 0357     pH, Sam 7.458     pCO2, Sam 36.0 mm Hg      pO2, Sam 83.4 mm Hg      HCO3, Sam 24.9 mmol/L      Base Excess, Sam 1.2 mmol/L      O2 Content, Sam 14.2 ml/dL      O2 HGB, VENOUS 92.0 %     POCT Blood Gas (CG8+) [[id number]]  (Abnormal) Collected: 05/17/25 0305    Lab Status: Final result Specimen: Venous Updated: 05/17/25 1557      ph, Sam ISTAT 7.480     pCO2, Sam i-STAT 34.9 mm HG      pO2, Sam i-STAT 44.0 mm HG      BE, i-STAT 3 mmol/L      HCO3, Sam i-STAT 26.0 mmol/L      CO2, i-STAT 27 mmol/L      O2 Sat, i-STAT 83 %      SODIUM, I-STAT 131 mmol/l      Potassium, i-STAT 4.7 mmol/L      Calcium, Ionized i-STAT 0.85 mmol/L      Hct, i-STAT 33 %      Hgb, i-STAT 11.2 g/dl      Glucose, i-STAT 152 mg/dl      Specimen Type VENOUS    UA w Reflex to Microscopic w Reflex to Culture [[id number]]     Lab Status: No result Specimen: Urine     Rapid drug screen, urine [[id number]]     Lab Status: No result Specimen: Urine     Fingerstick Glucose (POCT) [[id number]]  (Abnormal) Collected: 05/17/25 0304    Lab Status: Final result Specimen: Blood Updated: 05/17/25 0305     POC Glucose 153 mg/dl             CT head without contrast   Final Interpretation by Edward Guallpa MD (05/17 0575)      Exam markedly limited by streak artifact. Stable senescent changes as described above. No gross acute intracranial hemorrhage or depressed calvarial fracture. Additional ancillary findings detailed above.                  Workstation performed: [id number]         CT spine cervical without contrast   Final Interpretation by Edward Guallpa MD (05/17 3062)      Multilevel degenerative changes worse at the C3-4 level again noted. No acute cervical spine fracture or traumatic malalignment.                  Workstation performed: [id number]         CT chest abdomen pelvis wo contrast   Final Interpretation by Edward Guallpa MD (05/17 5032)      1.  Endotracheal tube tip terminates approximately 6 cm above the brian. Scattered mild interlobular septal thickening and areas of groundglass opacities are seen bilaterally which may reflect interstitial pulmonary edema and asymmetrical alveolar    edema. Additional small areas of irregular consolidative opacities are also seen bilaterally which may reflect areas of atelectasis or early airspace disease. Recommend clinical  correlation.   2.  A few nonspecific mildly prominent mediastinal and hilar lymph nodes measuring up to 1.7 x 1.1 cm noted.   3.  Subcutaneous air in the bilateral subclavian regions noted of uncertain etiology.   4.  Cirrhotic morphology of the liver and splenomegaly noted.   5.  Symmetrical renal atrophy and cortical thinning again noted bilaterally which could be related to chronic renal failure. No hydronephrosis.   6.  No evidence of bowel obstruction, inflammation, appendicitis, or free air. Moderate volume of abdominal/pelvic ascites noted.   7.  Extensive calcific atherosclerosis. Additional ancillary findings detailed above.               Workstation performed: [id number]         XR chest 1 view portable   ED Interpretation by Isidro Curry DO (05/17 0437)   Diffuse pulmonary edema, ET tube approximately 4 cm from brian, OG tube appears in place.          ECG 12 Lead Documentation Only    Date/Time: 5/17/2025 4:31 AM    Performed by: Isidro Curry DO  Authorized by: Isidro Curry DO    Indications / Diagnosis:  Unresponsive  Patient location:  ED  Previous ECG:     Previous ECG:  Compared to current    Similarity:  No change  Interpretation:     Interpretation: abnormal    Rate:     ECG rate:  59    ECG rate assessment: bradycardic    Rhythm:     Rhythm: sinus rhythm    Ectopy:     Ectopy: none    QRS:     QRS axis:  Left    QRS intervals:  Normal  Conduction:     Conduction: abnormal      Abnormal conduction: complete RBBB    ST segments:     ST segments:  Non-specific  T waves:     T waves: non-specific        ED Medication and Procedure Management   Prior to Admission Medications   Prescriptions Last Dose Informant Patient Reported? Taking?   Sevelamer Carbonate 2.4 g  Family Member Yes No   Sig: Take 1 packet by mouth Three times a day   Vitamin D3 1.25 MG (82982 UT) capsule  Family Member No No   Sig: TAKE 1 CAPSULE BY MOUTH ONE TIME PER WEEK   apixaban (Eliquis) 2.5 mg   Family Member No No   Sig: Take 1 tablet (2.5 mg total) by mouth 2 (two) times a day   aspirin 81 mg chewable tablet  Family Member Yes No   Sig: Chew 1 tablet daily   atorvastatin (LIPITOR) 40 mg tablet  Family Member No No   Sig: Take 1 tablet (40 mg total) by mouth daily with dinner   metoprolol succinate (TOPROL-XL) 25 mg 24 hr tablet  Family Member No No   Sig: TAKE 1 TABLET BY MOUTH TWICE A DAY   midodrine (PROAMATINE) 10 MG tablet  Family Member No No   Sig: Take 1 tablet (10 mg total) by mouth Before Dialysis (before dialysis)   prasugrel (EFFIENT) tablet  Family Member No No   Sig: Take 1 tablet (10 mg total) by mouth daily   sacubitril-valsartan (ENTRESTO) 24-26 MG TABS  Family Member Yes No   Sig: Take 1 tablet by mouth daily after dinner      Facility-Administered Medications: None     Current Discharge Medication List        CONTINUE these medications which have NOT CHANGED    Details   apixaban (Eliquis) 2.5 mg Take 1 tablet (2.5 mg total) by mouth 2 (two) times a day  Qty: 60 tablet, Refills: 3    Associated Diagnoses: History of DVT (deep vein thrombosis)      aspirin 81 mg chewable tablet Chew 1 tablet daily      atorvastatin (LIPITOR) 40 mg tablet Take 1 tablet (40 mg total) by mouth daily with dinner  Qty: 90 tablet, Refills: 3    Associated Diagnoses: Mixed hyperlipidemia      metoprolol succinate (TOPROL-XL) 25 mg 24 hr tablet TAKE 1 TABLET BY MOUTH TWICE A DAY  Qty: 180 tablet, Refills: 1    Associated Diagnoses: Primary hypertension      midodrine (PROAMATINE) 10 MG tablet Take 1 tablet (10 mg total) by mouth Before Dialysis (before dialysis)  Qty: 12 tablet, Refills: 0    Associated Diagnoses: Hypotension, unspecified hypotension type; ESRD on dialysis (HCC)      prasugrel (EFFIENT) tablet Take 1 tablet (10 mg total) by mouth daily  Qty: 90 tablet, Refills: 3    Associated Diagnoses: Status post insertion of drug eluting coronary artery stent; Ischemic cardiomyopathy       sacubitril-valsartan (ENTRESTO) 24-26 MG TABS Take 1 tablet by mouth daily after dinner      Sevelamer Carbonate 2.4 g Take 1 packet by mouth Three times a day      Vitamin D3 1.25 MG (21286 UT) capsule TAKE 1 CAPSULE BY MOUTH ONE TIME PER WEEK  Qty: 12 capsule, Refills: 4    Associated Diagnoses: Vitamin D deficiency           No discharge procedures on file.  ED SEPSIS DOCUMENTATION   Time reflects when diagnosis was documented in both MDM as applicable and the Disposition within this note       Time User Action Codes Description Comment    5/17/2025  3:54 AM Isidro Curry [G93.40] Acute encephalopathy     5/17/2025  3:54 AM Isidro Curry [R41.82] Altered mental status     5/17/2025  4:02 AM Isidro Curry [T68.XXXA] Hypothermia, initial encounter     5/17/2025  4:02 AM Isidro Curry Add [D63.8] Anemia of chronic disease     5/17/2025  4:03 AM Isidro Curry Add [J96.01] Acute respiratory failure with hypoxia (HCC)     5/17/2025  4:03 AM Isidro Curry Modify [G93.40] Acute encephalopathy     5/17/2025  4:03 AM Isidro Curry Modify [J96.01] Acute respiratory failure with hypoxia (HCC)     5/17/2025  4:04 AM Isidro Curry [I50.20] HFrEF (heart failure with reduced ejection fraction) ([facility])     5/17/2025  4:04 AM Isidro Curry [Z95.810] Presence of external cardiac defibrillator     5/17/2025  4:18 AM Willard Rodriguez Add [N18.6,  Z99.2] ESRD on dialysis ([facility])     5/17/2025  4:19 AM Willard Rodriguez Add [J96.01] Acute hypoxic respiratory failure ([facility])     5/17/2025  4:28 AM Isidro Curry Add [R79.89] Elevated TSH     5/17/2025  4:29 AM Isidro Curry Add [R79.89] Elevated brain natriuretic peptide (BNP) level     5/17/2025  4:29 AM Isidro Curry [R79.89] Elevated troponin     5/17/2025  4:55 AM Isidro Curry [E87.20] Lactic acidosis     5/17/2025  5:26 AM Beatriz Leung [Z86.73] History of stroke in adulthood     5/17/2025  5:26 AM  Beatriz Leung Add [R65.10] SIRS (systemic inflammatory response syndrome) (HCC)     5/17/2025  6:19 AM Isidro Curry [J81.1] Pulmonary edema     5/17/2025  6:22 AM Isidro Curry [K74.60] Cirrhosis of liver (HCC)            Initial Sepsis Screening       Row Name 05/17/25 0559                Is the patient's history suggestive of a new or worsening infection? Yes (Proceed)  -TQ        Suspected source of infection suspect infection, source unknown  -TQ        Indicate SIRS criteria Hyperthemia > 38.3C (100.9F) OR Hypothermia <36C (96.8F)  -TQ        Are two or more of the above signs & symptoms of infection both present and new to the patient? No  -TQ                  User Key  (r) = Recorded By, (t) = Taken By, (c) = Cosigned By      Initials Name Provider Type    TQ Willard Rodriguez MD Resident                         Isidro Curry,   05/17/25 0623         [1]   Social History  Tobacco Use    Smoking status: Never    Smokeless tobacco: Never   Vaping Use    Vaping status: Never Used   Substance Use Topics    Alcohol use: Never    Drug use: No        Isidro Curry,   05/17/25 0625

## 2025-05-17 NOTE — H&P
H&P - Critical Care/ICU   Name: Asad Galloway 65 y.o. male I MRN: [id number]  Unit/Bed#: ICU 03 I Date of Admission: 5/17/2025   Date of Service: 5/17/2025 I Hospital Day: 0       Assessment & Plan  Acute hypoxic respiratory failure (HCC)  Unresponsive on arrival to the ED. Intubated for airway protection and for hypoxia.    -Mechanical ventilation volume control    14/500/100/6  -SBT and extubation as appropriate  ESRD on dialysis (HCC)  Lab Results   Component Value Date    EGFR 11 05/17/2025    EGFR 11 05/17/2025    EGFR 13 05/06/2025    CREATININE 4.94 (H) 05/17/2025    CREATININE 5.02 (H) 05/17/2025    CREATININE 4.27 (H) 05/06/2025     Hx of HD 3x per week.    -Trend renal functioning  -Plan for HD if BP stabilizes  -On Levophed    Elevated troponin  Noted to have troponin of 88 in the ED.     -Trend Troponin level  -EKG obtained  -CXR  Hypothermia  Hypothermic after rectal temperature in ED.      -Bob hugger as needed.  Altered mental status  Presented to the ED via EMS after being found unresponsive by family at home. Unknown amount of downtime. Noted to be on HD 3x per week which recently switched to home HD with less removal of fluid. Usually has 4.5-5 L removed and now only about 1 L removed per session. Dry weight is 94 kg as per family and is 107 kg in ED. Elevated lactate.     -Intubated for airway protection  -CTH pending  -CT chest/a/p pending  -Neuro checks Q1H  -Septic workup started  -Trend lactate  -Started on vanco/cefepime for empiric coverage       Disposition: Critical care    History of Present Illness   Asad Galloway is a 65 y.o. with PMH of ESRD on HD M/W/F, HF with lifevest, DM, and CAD who presented to the hospital after being found unresponsive by family early this morning. As per son, patient normally goes to a facility for HD but recently started home HD with less fluid being pulled off. His normal fluid output is 4.5-5 L but at home the machine has only been taking off about 1 L  and his fluid intake has remained the same. Also noted that his dry weight is 94 kg as per the son which has increased to 107 kg this admission. Patient was intubated in the ED for airway protection and due to hypoxia. Transferred to ICU for monitoring.    History obtained from chart review and son.      Historical Information   Past Medical History:  07/29/2018: Cerebrovascular accident (CVA) due to thrombosis of left   middle cerebral artery (HCC)  No date: Chronic kidney disease  No date: Coronary artery disease  No date: Diabetes mellitus (HCC)      Comment:  not on meds  No date: Dialysis patient ([facility])      Comment:  M-W-F  No date: Fistula      Comment:  left upper arm for hemodialyis  No date: GERD (gastroesophageal reflux disease)  No date: History of coronary artery stent placement      Comment:  x3  No date: Hypercholesteremia  No date: Hyperlipidemia  No date: Hypertension  No date: Infectious viral hepatitis      Comment:  B as child  No date: Limb alert care status      Comment:  no BP/IV left arm  No date: Neuropathy  04/25/2023: Nonhealing ulcer of heel ([facility])  No date: Obesity  No date: Osteomyelitis ([facility])      Comment:  last assessed 11/4/16-per son not currently  01/19/2022: Penetrating foot wound, left, initial encounter  No date: PVC's (premature ventricular contractions)      Comment:  sees [facility]  No date: Stroke ([facility])      Comment:  last weeof July 2018 [facility]  10/28/2018: TIA (transient ischemic attack)  06/13/2024: Ulcer of left heel, limited to breakdown of skin ([facility])  No date: Wears dentures  No date: Wears glasses Past Surgical History:  No date: ABDOMINAL SURGERY  05/02/2022: CARDIAC CATHETERIZATION; N/A      Comment:  Procedure: Cardiac Coronary Angiogram;  Surgeon: Sam Davis MD;  [facility];  Service:                Cardiology  05/02/2022: CARDIAC CATHETERIZATION; N/A      Comment:  Procedure: Cardiac pci;  Surgeon: Sam Davis  "MD;                 [facility];  Service: Cardiology  02/01/2023: CARDIAC CATHETERIZATION      Comment:  Procedure: Cardiac catheterization;  Surgeon: Sam Davis MD;  [facility];  Service:                Cardiology  02/01/2023: CARDIAC CATHETERIZATION; N/A      Comment:  Procedure: Cardiac pci;  Surgeon: Sam Davis MD;                 [facility];  Service: Cardiology  02/01/2023: CARDIAC CATHETERIZATION; N/A      Comment:  Procedure: Cardiac Coronary Angiogram;  Surgeon: Sam Davis MD;  [facility];  Service:                Cardiology  02/01/2023: CARDIAC CATHETERIZATION; N/A      Comment:  Procedure: Cardiac other-IVUS;  Surgeon: Sam Davis MD;  [facility]: [facility];  Service: Cardiology  No date: CHOLECYSTECTOMY      Comment:  Percutaneous  No date: COLONOSCOPY  No date: CORONARY ANGIOPLASTY WITH STENT PLACEMENT  No date: CYSTOSCOPY  1/22/2024: HEMODIALYSIS ADULT  01/24/2024: HEMODIALYSIS ADULT  9/29/2023: IR LOWER EXTREMITY ANGIOGRAM  02/26/2024: IR LOWER EXTREMITY ANGIOGRAM  No date: OTHER SURGICAL HISTORY      Comment:  \"stimulator to control bowel movements\"  09/27/2016: IA ESOPHAGOGASTRODUODENOSCOPY TRANSORAL DIAGNOSTIC; N/A      Comment:  Procedure: ESOPHAGOGASTRODUODENOSCOPY (EGD);  Surgeon:                Adele Rowe MD;  Location: [facility] GI LAB;  Service:                Gastroenterology  02/29/2016: IA LAPAROSCOPY SURG CHOLECYSTECTOMY; N/A      Comment:  Procedure: LAPAROSCOPIC CHOLECYSTECTOMY ;  Surgeon:                Cliff Roman DO;  Location: [facility];  Service:                General  9/29/2023: IA SLCTV CATHJ 3RD+ ORD SLCTV ABDL PEL/LXTR BRNCH; Left      Comment:  Procedure: Left leg angiogram with intervention;                 Surgeon: Michelle Galvan MD;  Location: [facility];                Service: Vascular  02/26/2024: IA SLCTV CATHJ 3RD+ ORD SLCTV " "ABDL PEL/LXTR BRNCH; Left      Comment:  Procedure: Left leg angiogram antegrade access, left                popliteal angioplasty;  Surgeon: Michelle Galvan MD;               Location: [facility];  Service: Vascular  No date: ROTATOR CUFF REPAIR; Right  No date: SPINAL CORD STIMULATOR IMPLANT      Comment:  \"Medtronic interstim model # 3058- in lower back to                control bowel movements-currently turned off-battery is                dead\"  10/28/2016: TOE AMPUTATION; Right      Comment:  Procedure: 3RD TOE AMPUTATION ;  Surgeon: Anjel Salas DPM;  Location: [facility];  Service:    Current Outpatient Medications   Medication Instructions    apixaban (ELIQUIS) 2.5 mg, Oral, 2 times daily    aspirin 81 mg chewable tablet 1 tablet, Oral, Daily    atorvastatin (LIPITOR) 40 mg, Oral, Daily with dinner    metoprolol succinate (TOPROL-XL) 25 mg, Oral, 2 times daily    midodrine (PROAMATINE) 10 mg, Oral, Before Dialysis    prasugrel (EFFIENT) 10 mg, Oral, Daily    sacubitril-valsartan (ENTRESTO) 24-26 MG TABS 1 tablet, Oral, Daily after dinner    Sevelamer Carbonate 2.4 g 1 packet, Oral, 3 times daily    Vitamin D3 1.25 MG (58669 UT) capsule TAKE 1 CAPSULE BY MOUTH ONE TIME PER WEEK    Allergies[1]   Social History[2] Family History   Problem Relation Age of Onset    Leukemia Mother     Liver disease Mother     Lung cancer Mother         heavy smoker - 3 ppd    Heart disease Father     Liver disease Father     Diabetes type I Father     Multiple myeloma Sister     Breast cancer Sister     Urolithiasis Family     Alcohol abuse Neg Hx     Depression Neg Hx     Drug abuse Neg Hx     Substance Abuse Neg Hx     Mental illness Neg Hx           Objective :                   Vitals I/O      Most Recent Min/Max in 24hrs   Temp (!) 96.4 °F (35.8 °C) Temp  Min: 90.7 °F (32.6 °C)  Max: 96.4 °F (35.8 °C)   Pulse 62 Pulse  Min: 56  Max: 63   Resp 18 Resp  Min: 11  Max: 22   /51 BP  Min: 96/51  " Max: 117/56   O2 Sat 98 % SpO2  Min: 71 %  Max: 99 %    No intake or output data in the 24 hours ending 05/17/25 0601    Diet NPO    Invasive Monitoring           Physical Exam   Physical Exam  Vitals and nursing note reviewed.   Eyes:      Conjunctiva/sclera: Conjunctivae normal.      Pupils: Pupils are equal, round, and reactive to light.   Skin:     General: Skin is warm and dry.      Comments: Chronic wounds with scabbing on BL upper and lower extremities.   HENT:      Head: Normocephalic and atraumatic.      Right Ear: Hearing normal.      Left Ear: Hearing normal.      Mouth/Throat:      Mouth: Mucous membranes are moist.   Cardiovascular:      Rate and Rhythm: Normal rate and regular rhythm.      Heart sounds: Normal heart sounds.      Comments: RRR with +S1 and S2, no murmurs appreciated on exam. Peripheral pulses intact.    Musculoskeletal:         General: Normal range of motion.      Comments: BL 2+ pitting edema of lower extremities on exam.   Abdominal: General: Bowel sounds are normal. There is no distension.      Palpations: Abdomen is soft.      Tenderness: There is no abdominal tenderness.      Comments: Abdominal distension with ascitic fluid wave noted. Soft, non tender, normo-active bowel sounds. Without rigidity, guarding.     Constitutional:       Appearance: He is well-developed and well-nourished. He is ill-appearing.      Interventions: He is sedated, intubated and restrained.   Pulmonary:      Effort: Pulmonary effort is normal. No accessory muscle usage, respiratory distress or accessory muscle usage. He is intubated.      Breath sounds: Normal breath sounds. No stridor. No wheezing, rhonchi or rales.      Comments: Intubated on mechanical ventilation. CTABL with no abnormal lung sounds such as wheezes or rales appreciated on exam.     Neurological:      Comments: Sedated and intubated.    Genitourinary/Anorectal:     Comments: External rectal exam performed with THIERNO Gallegos at bedside as  chaperone. No presence of hemorrhoids, fissures, or fistulas noted. Digital rectal exam performed with hard stool noted in the rectal vault. Normal appearance of stool. Rectal tone intact. No gross blood noted.    Bo present.        Diagnostic Studies        Lab Results: I have reviewed the following results:     Medications:  Scheduled PRN   cefepime, 1,000 mg, Q24H  chlorhexidine, 15 mL, Q12H MICH  heparin (porcine), 5,000 Units, Q8H MICH  ROCuronium, 100 mg, Once  vancomycin, 15 mg/kg, Q24H          Continuous    norepinephrine, 1-30 mcg/min, Last Rate: 2 mcg/min (05/17/25 0527)  NxStage K 2/Ca 3, 20,000 mL  propofol, 5-50 mcg/kg/min, Last Rate: Stopped (05/17/25 0456)         Labs:   CBC    Recent Labs     05/17/25 0334 05/17/25  0502   WBC 4.96 6.11   HGB 9.7* 9.8*   HCT 31.1* 31.0*   PLT 91* 95*     BMP    Recent Labs     05/17/25 0334 05/17/25  0502   SODIUM 134* 134*   K 5.0 5.2   CL 92* 93*   CO2 28 29   AGAP 14* 12   BUN 32* 32*   CREATININE 5.02* 4.94*   CALCIUM 7.8* 7.7*       Coags    Recent Labs     05/17/25 0334   INR 1.47*   PTT 34        Additional Electrolytes  Recent Labs     05/17/25  0305 05/17/25 0334 05/17/25  0502   MG  --  2.1 2.1   PHOS  --   --  5.5*   CAIONIZED 0.85*  --  0.87*          Blood Gas    Recent Labs     05/17/25 0537   PHART 7.545*   LNW6GAF 33.9*   PO2ART 427.3*   MMM0PPS 28.7*   BEART 6.2   SOURCE Radial, Right     Recent Labs     05/17/25 0334 05/17/25  0537   PHVEN 7.458*  --    VDO4CVH 36.0*  --    PO2VEN 83.4*  --    UCV2IGK 24.9  --    BEVEN 1.2  --    W6RVQDG 92.0*  --    SOURCE  --  Radial, Right    LFTs  Recent Labs     05/17/25 0334 05/17/25  0502   ALT 8 8   AST 11* 11*   ALKPHOS 131* 124*   ALB 3.8 3.8   TBILI 0.81 1.08*       Infectious  Recent Labs     05/17/25  0334   PROCALCITONI 0.23     Glucose  Recent Labs     05/17/25  0334 05/17/25  0502   GLUC 147* 117               [1] No Known Allergies  [2]   Social History  Tobacco Use    Smoking status:  Never    Smokeless tobacco: Never   Vaping Use    Vaping status: Never Used   Substance Use Topics    Alcohol use: Never    Drug use: No

## 2025-05-17 NOTE — ASSESSMENT & PLAN NOTE
Lab Results   Component Value Date    EGFR 11 05/17/2025    EGFR 11 05/17/2025    EGFR 13 05/06/2025    CREATININE 4.94 (H) 05/17/2025    CREATININE 5.02 (H) 05/17/2025    CREATININE 4.27 (H) 05/06/2025     Hx of HD 3x per week.    -Trend renal functioning  -Plan for HD if BP stabilizes  -On Levophed

## 2025-05-18 ENCOUNTER — APPOINTMENT (INPATIENT)
Dept: DIALYSIS | Facility: HOSPITAL | Age: 65
DRG: 208 | End: 2025-05-18
Payer: MEDICARE

## 2025-05-18 ENCOUNTER — APPOINTMENT (INPATIENT)
Dept: NON INVASIVE DIAGNOSTICS | Facility: HOSPITAL | Age: 65
DRG: 208 | End: 2025-05-18
Payer: MEDICARE

## 2025-05-18 ENCOUNTER — HOME CARE VISIT (OUTPATIENT)
Dept: HOME HEALTH SERVICES | Facility: HOME HEALTHCARE | Age: 65
End: 2025-05-18
Payer: MEDICARE

## 2025-05-18 PROBLEM — A41.9 SEPSIS (HCC): Status: ACTIVE | Noted: 2025-05-18

## 2025-05-18 PROBLEM — J18.9 PNEUMONIA: Status: ACTIVE | Noted: 2025-05-18

## 2025-05-18 LAB
ANION GAP SERPL CALCULATED.3IONS-SCNC: 11 MMOL/L (ref 4–13)
ANISOCYTOSIS BLD QL SMEAR: PRESENT
AORTIC ROOT: 3.7 CM
AORTIC VALVE MEAN VELOCITY: 23.1 M/S
APTT PPP: 122 SECONDS (ref 23–34)
APTT PPP: 40 SECONDS (ref 23–34)
APTT PPP: 53 SECONDS (ref 23–34)
APTT PPP: >210 SECONDS (ref 23–34)
ASCENDING AORTA: 3.9 CM
ATRIAL RATE: 54 BPM
ATRIAL RATE: 59 BPM
ATRIAL RATE: 77 BPM
AV AREA BY CONTINUOUS VTI: 1.4 CM2
AV AREA PEAK VELOCITY: 1.3 CM2
AV LVOT MEAN GRADIENT: 1 MMHG
AV LVOT PEAK GRADIENT: 2 MMHG
AV MEAN PRESS GRAD SYS DOP V1V2: 24 MMHG
AV ORIFICE AREA US: 1.44 CM2
AV PEAK GRADIENT: 41 MMHG
AV REGURGITATION PRESSURE HALF TIME: 221 MS
AV VELOCITY RATIO: 0.25
AV VMAX SYS DOP: 3.2 M/S
BASOPHILS # BLD AUTO: 0.02 THOUSANDS/ÂΜL (ref 0–0.1)
BASOPHILS NFR BLD AUTO: 0 % (ref 0–1)
BSA FOR ECHO PROCEDURE: 2.11 M2
BUN SERPL-MCNC: 24 MG/DL (ref 5–25)
CA-I BLD-SCNC: 0.89 MMOL/L (ref 1.12–1.32)
CA-I BLD-SCNC: 0.93 MMOL/L (ref 1.12–1.32)
CA-I BLD-SCNC: 1 MMOL/L (ref 1.12–1.32)
CALCIUM SERPL-MCNC: 7.9 MG/DL (ref 8.4–10.2)
CHLORIDE SERPL-SCNC: 94 MMOL/L (ref 96–108)
CO2 SERPL-SCNC: 31 MMOL/L (ref 21–32)
CREAT SERPL-MCNC: 4.34 MG/DL (ref 0.6–1.3)
DOP CALC AO VTI: 68.11 CM
DOP CALC LVOT AREA: 5.72 CM2
DOP CALC LVOT DIAMETER: 2.7 CM
DOP CALC LVOT PEAK VEL VTI: 17.18 CM
DOP CALC LVOT PEAK VEL: 0.74 M/S
DOP CALC LVOT STROKE INDEX: 45.8 ML/M2
DOP CALC LVOT STROKE VOLUME: 98.32
E WAVE DECELERATION TIME: 203 MS
E/A RATIO: 4.18
EOSINOPHIL # BLD AUTO: 0.04 THOUSAND/ÂΜL (ref 0–0.61)
EOSINOPHIL NFR BLD AUTO: 1 % (ref 0–6)
ERYTHROCYTE [DISTWIDTH] IN BLOOD BY AUTOMATED COUNT: 16.9 % (ref 11.6–15.1)
FRACTIONAL SHORTENING: 20 (ref 28–44)
GFR SERPL CREATININE-BSD FRML MDRD: 13 ML/MIN/1.73SQ M
GLUCOSE SERPL-MCNC: 102 MG/DL (ref 65–140)
GLUCOSE SERPL-MCNC: 154 MG/DL (ref 65–140)
GLUCOSE SERPL-MCNC: 64 MG/DL (ref 65–140)
GLUCOSE SERPL-MCNC: 83 MG/DL (ref 65–140)
GLUCOSE SERPL-MCNC: 97 MG/DL (ref 65–140)
HCT VFR BLD AUTO: 27.2 % (ref 36.5–49.3)
HCT VFR BLD AUTO: 27.3 % (ref 36.5–49.3)
HCT VFR BLD AUTO: 29.6 % (ref 36.5–49.3)
HGB BLD-MCNC: 8.5 G/DL (ref 12–17)
HGB BLD-MCNC: 8.6 G/DL (ref 12–17)
HGB BLD-MCNC: 9.5 G/DL (ref 12–17)
IMM GRANULOCYTES # BLD AUTO: 0.02 THOUSAND/UL (ref 0–0.2)
IMM GRANULOCYTES NFR BLD AUTO: 0 % (ref 0–2)
INTERVENTRICULAR SEPTUM IN DIASTOLE (PARASTERNAL SHORT AXIS VIEW): 1.5 CM
INTERVENTRICULAR SEPTUM: 1.5 CM (ref 0.6–1.1)
LAAS-AP2: 31 CM2
LAAS-AP4: 28.9 CM2
LEFT ATRIUM SIZE: 5 CM
LEFT ATRIUM VOLUME (MOD BIPLANE): 111 ML
LEFT ATRIUM VOLUME INDEX (MOD BIPLANE): 52.4 ML/M2
LEFT INTERNAL DIMENSION IN SYSTOLE: 4.7 CM (ref 2.1–4)
LEFT VENTRICLE DIASTOLIC VOLUME (MOD BIPLANE): 207 ML
LEFT VENTRICLE DIASTOLIC VOLUME INDEX (MOD BIPLANE): 98.1 ML/M2
LEFT VENTRICLE SYSTOLIC VOLUME (MOD BIPLANE): 148 ML
LEFT VENTRICLE SYSTOLIC VOLUME INDEX (MOD BIPLANE): 70.1 ML/M2
LEFT VENTRICULAR INTERNAL DIMENSION IN DIASTOLE: 5.9 CM (ref 3.5–6)
LEFT VENTRICULAR POSTERIOR WALL IN END DIASTOLE: 1.2 CM
LEFT VENTRICULAR STROKE VOLUME: 70 ML
LV EF BIPLANE MOD: 28 %
LV EF US.2D.A4C+ESTIMATED: 30 %
LVSV (TEICH): 70 ML
LYMPHOCYTES # BLD AUTO: 0.61 THOUSANDS/ÂΜL (ref 0.6–4.47)
LYMPHOCYTES NFR BLD AUTO: 9 % (ref 14–44)
MAGNESIUM SERPL-MCNC: 2.1 MG/DL (ref 1.9–2.7)
MCH RBC QN AUTO: 31.3 PG (ref 26.8–34.3)
MCHC RBC AUTO-ENTMCNC: 31.1 G/DL (ref 31.4–37.4)
MCV RBC AUTO: 100 FL (ref 82–98)
MONOCYTES # BLD AUTO: 0.63 THOUSAND/ÂΜL (ref 0.17–1.22)
MONOCYTES NFR BLD AUTO: 9 % (ref 4–12)
MRSA NOSE QL CULT: NORMAL
MV PEAK A VEL: 0.34 M/S
MV PEAK E VEL: 142 CM/S
MV STENOSIS PRESSURE HALF TIME: 59 MS
MV VALVE AREA P 1/2 METHOD: 3.73
NEUTROPHILS # BLD AUTO: 5.4 THOUSANDS/ÂΜL (ref 1.85–7.62)
NEUTS SEG NFR BLD AUTO: 81 % (ref 43–75)
NRBC BLD AUTO-RTO: 0 /100 WBCS
OVALOCYTES BLD QL SMEAR: PRESENT
P AXIS: 61 DEGREES
P AXIS: 75 DEGREES
P AXIS: 84 DEGREES
PHOSPHATE SERPL-MCNC: 3.8 MG/DL (ref 2.3–4.1)
PLATELET # BLD AUTO: 77 THOUSANDS/UL (ref 149–390)
PLATELET BLD QL SMEAR: ADEQUATE
PMV BLD AUTO: 11.4 FL (ref 8.9–12.7)
POIKILOCYTOSIS BLD QL SMEAR: PRESENT
POTASSIUM SERPL-SCNC: 4.6 MMOL/L (ref 3.5–5.3)
PR INTERVAL: 178 MS
PR INTERVAL: 188 MS
PR INTERVAL: 200 MS
QRS AXIS: -30 DEGREES
QRS AXIS: -60 DEGREES
QRS AXIS: 18 DEGREES
QRSD INTERVAL: 156 MS
QRSD INTERVAL: 174 MS
QRSD INTERVAL: 178 MS
QT INTERVAL: 482 MS
QT INTERVAL: 540 MS
QT INTERVAL: 586 MS
QTC INTERVAL: 534 MS
QTC INTERVAL: 545 MS
QTC INTERVAL: 555 MS
RBC # BLD AUTO: 2.72 MILLION/UL (ref 3.88–5.62)
RBC MORPH BLD: PRESENT
RIGHT ATRIAL 2D VOLUME: 74 ML
RIGHT ATRIUM AREA SYSTOLE A4C: 23.9 CM2
RIGHT VENTRICLE ID DIMENSION: 4.8 CM
SL CV AV DECELERATION TIME RETROGRADE: 761 MS
SL CV AV PEAK GRADIENT RETROGRADE: 43 MMHG
SL CV LEFT ATRIUM LENGTH A2C: 6.5 CM
SL CV LV EF: 25
SL CV PED ECHO LEFT VENTRICLE DIASTOLIC VOLUME (MOD BIPLANE) 2D: 171 ML
SL CV PED ECHO LEFT VENTRICLE SYSTOLIC VOLUME (MOD BIPLANE) 2D: 102 ML
SODIUM SERPL-SCNC: 136 MMOL/L (ref 135–147)
T WAVE AXIS: -80 DEGREES
T WAVE AXIS: 242 DEGREES
T WAVE AXIS: 87 DEGREES
TR MAX PG: 39 MMHG
TR PEAK VELOCITY: 3.1 M/S
TRICUSPID VALVE PEAK REGURGITATION VELOCITY: 3.13 M/S
VENTRICULAR RATE: 54 BPM
VENTRICULAR RATE: 59 BPM
VENTRICULAR RATE: 77 BPM
WBC # BLD AUTO: 6.72 THOUSAND/UL (ref 4.31–10.16)

## 2025-05-18 PROCEDURE — 85014 HEMATOCRIT: CPT | Performed by: NURSE PRACTITIONER

## 2025-05-18 PROCEDURE — 82330 ASSAY OF CALCIUM: CPT

## 2025-05-18 PROCEDURE — 85025 COMPLETE CBC W/AUTO DIFF WBC: CPT

## 2025-05-18 PROCEDURE — 85730 THROMBOPLASTIN TIME PARTIAL: CPT

## 2025-05-18 PROCEDURE — 94150 VITAL CAPACITY TEST: CPT

## 2025-05-18 PROCEDURE — 99232 SBSQ HOSP IP/OBS MODERATE 35: CPT | Performed by: INTERNAL MEDICINE

## 2025-05-18 PROCEDURE — 94003 VENT MGMT INPAT SUBQ DAY: CPT

## 2025-05-18 PROCEDURE — 85730 THROMBOPLASTIN TIME PARTIAL: CPT | Performed by: SURGERY

## 2025-05-18 PROCEDURE — 93321 DOPPLER ECHO F-UP/LMTD STD: CPT | Performed by: INTERNAL MEDICINE

## 2025-05-18 PROCEDURE — 80048 BASIC METABOLIC PNL TOTAL CA: CPT

## 2025-05-18 PROCEDURE — 84100 ASSAY OF PHOSPHORUS: CPT

## 2025-05-18 PROCEDURE — C8924 2D TTE W OR W/O FOL W/CON,FU: HCPCS

## 2025-05-18 PROCEDURE — 99233 SBSQ HOSP IP/OBS HIGH 50: CPT | Performed by: STUDENT IN AN ORGANIZED HEALTH CARE EDUCATION/TRAINING PROGRAM

## 2025-05-18 PROCEDURE — 93010 ELECTROCARDIOGRAM REPORT: CPT | Performed by: INTERNAL MEDICINE

## 2025-05-18 PROCEDURE — 82948 REAGENT STRIP/BLOOD GLUCOSE: CPT

## 2025-05-18 PROCEDURE — 99233 SBSQ HOSP IP/OBS HIGH 50: CPT | Performed by: INTERNAL MEDICINE

## 2025-05-18 PROCEDURE — 85018 HEMOGLOBIN: CPT | Performed by: NURSE PRACTITIONER

## 2025-05-18 PROCEDURE — 93325 DOPPLER ECHO COLOR FLOW MAPG: CPT | Performed by: INTERNAL MEDICINE

## 2025-05-18 PROCEDURE — 93325 DOPPLER ECHO COLOR FLOW MAPG: CPT

## 2025-05-18 PROCEDURE — 94760 N-INVAS EAR/PLS OXIMETRY 1: CPT

## 2025-05-18 PROCEDURE — 93321 DOPPLER ECHO F-UP/LMTD STD: CPT

## 2025-05-18 PROCEDURE — 93308 TTE F-UP OR LMTD: CPT | Performed by: INTERNAL MEDICINE

## 2025-05-18 PROCEDURE — 83735 ASSAY OF MAGNESIUM: CPT

## 2025-05-18 RX ORDER — CALCIUM GLUCONATE 20 MG/ML
2 INJECTION, SOLUTION INTRAVENOUS ONCE
Status: COMPLETED | OUTPATIENT
Start: 2025-05-18 | End: 2025-05-18

## 2025-05-18 RX ORDER — DEXTROSE MONOHYDRATE 25 G/50ML
50 INJECTION, SOLUTION INTRAVENOUS ONCE
Status: COMPLETED | OUTPATIENT
Start: 2025-05-18 | End: 2025-05-18

## 2025-05-18 RX ORDER — ALBUMIN (HUMAN) 12.5 G/50ML
12.5 SOLUTION INTRAVENOUS ONCE
Status: COMPLETED | OUTPATIENT
Start: 2025-05-18 | End: 2025-05-18

## 2025-05-18 RX ORDER — FENTANYL CITRATE 50 UG/ML
25 INJECTION, SOLUTION INTRAMUSCULAR; INTRAVENOUS EVERY 4 HOURS PRN
Refills: 0 | Status: DISCONTINUED | OUTPATIENT
Start: 2025-05-18 | End: 2025-05-18

## 2025-05-18 RX ORDER — ALBUMIN (HUMAN) 12.5 G/50ML
25 SOLUTION INTRAVENOUS ONCE
Status: COMPLETED | OUTPATIENT
Start: 2025-05-18 | End: 2025-05-18

## 2025-05-18 RX ORDER — MIDODRINE HYDROCHLORIDE 5 MG/1
10 TABLET ORAL
Status: DISCONTINUED | OUTPATIENT
Start: 2025-05-18 | End: 2025-05-23 | Stop reason: HOSPADM

## 2025-05-18 RX ORDER — ACETAMINOPHEN 160 MG/5ML
650 SUSPENSION ORAL EVERY 6 HOURS PRN
Status: DISCONTINUED | OUTPATIENT
Start: 2025-05-18 | End: 2025-05-23 | Stop reason: HOSPADM

## 2025-05-18 RX ORDER — MIDODRINE HYDROCHLORIDE 5 MG/1
10 TABLET ORAL
Status: DISCONTINUED | OUTPATIENT
Start: 2025-05-18 | End: 2025-05-19

## 2025-05-18 RX ADMIN — HEPARIN SODIUM 4000 UNITS: 1000 INJECTION, SOLUTION INTRAVENOUS; SUBCUTANEOUS at 09:05

## 2025-05-18 RX ADMIN — ASPIRIN 81 MG: 81 TABLET, CHEWABLE ORAL at 08:05

## 2025-05-18 RX ADMIN — PERFLUTREN 0.4 ML/MIN: 6.52 INJECTION, SUSPENSION INTRAVENOUS at 09:09

## 2025-05-18 RX ADMIN — DEXTROSE MONOHYDRATE 50 ML: 25 INJECTION, SOLUTION INTRAVENOUS at 06:44

## 2025-05-18 RX ADMIN — PANTOPRAZOLE SODIUM 40 MG: 40 INJECTION, POWDER, LYOPHILIZED, FOR SOLUTION INTRAVENOUS at 21:32

## 2025-05-18 RX ADMIN — MIDODRINE HYDROCHLORIDE 10 MG: 5 TABLET ORAL at 15:34

## 2025-05-18 RX ADMIN — FENTANYL CITRATE 50 MCG: 50 INJECTION INTRAMUSCULAR; INTRAVENOUS at 04:30

## 2025-05-18 RX ADMIN — CALCIUM GLUCONATE 2 G: 20 INJECTION, SOLUTION INTRAVENOUS at 06:45

## 2025-05-18 RX ADMIN — PANTOPRAZOLE SODIUM 40 MG: 40 INJECTION, POWDER, LYOPHILIZED, FOR SOLUTION INTRAVENOUS at 08:06

## 2025-05-18 RX ADMIN — HEPARIN SODIUM 2000 UNITS: 1000 INJECTION, SOLUTION INTRAVENOUS; SUBCUTANEOUS at 16:09

## 2025-05-18 RX ADMIN — NOREPINEPHRINE BITARTRATE 2 MCG/MIN: 1 INJECTION INTRAVENOUS at 07:43

## 2025-05-18 RX ADMIN — ALBUMIN (HUMAN) 25 G: 0.25 INJECTION, SOLUTION INTRAVENOUS at 10:02

## 2025-05-18 RX ADMIN — HEPARIN SODIUM 8.1 UNITS/KG/HR: 10000 INJECTION, SOLUTION INTRAVENOUS at 07:43

## 2025-05-18 RX ADMIN — CHLORHEXIDINE GLUCONATE 15 ML: 1.2 SOLUTION ORAL at 08:06

## 2025-05-18 RX ADMIN — ALBUMIN (HUMAN) 12.5 G: 0.25 INJECTION, SOLUTION INTRAVENOUS at 09:42

## 2025-05-18 RX ADMIN — ACETAMINOPHEN 650 MG: 650 SUSPENSION ORAL at 06:45

## 2025-05-18 RX ADMIN — MIDODRINE HYDROCHLORIDE 10 MG: 5 TABLET ORAL at 10:02

## 2025-05-18 RX ADMIN — CEFEPIME 1000 MG: 1 INJECTION, POWDER, FOR SOLUTION INTRAMUSCULAR; INTRAVENOUS at 06:06

## 2025-05-18 RX ADMIN — CALCIUM GLUCONATE 2 G: 20 INJECTION, SOLUTION INTRAVENOUS at 12:57

## 2025-05-18 RX ADMIN — PRASUGREL 10 MG: 10 TABLET, FILM COATED ORAL at 08:07

## 2025-05-18 RX ADMIN — MIDODRINE HYDROCHLORIDE 10 MG: 5 TABLET ORAL at 07:11

## 2025-05-18 RX ADMIN — CHLORHEXIDINE GLUCONATE 15 ML: 1.2 SOLUTION ORAL at 21:32

## 2025-05-18 NOTE — PROGRESS NOTES
"Cardiology Progress Note   Asad Galloway 65 y.o. male MRN: [id number]    Unit/Bed#: ICU 03 Encounter: [id number]      ASSESSMENT:  1. Unresponsive episode  2. Acute volume overload secondary to inadequate HD  3. Multivessel CAD s/p PCI in March   4. Ischemic cardiomyopathy, EF 30-35% with WCD  5. Severe AS, receiving TAVR workup at [facility]   6. Hx of DVT on Eliquis   7. ESRD on HD  8. Elevated troponin secondary to #2 and #7    Plan:  Asad is now extubated and answering questions appropriately.  He remains hypervolemic and receiving HD during my exam today.  LifeVest was interrogated did not show any significant arrhythmias, no shocks.  Repeat echocardiogram ordered and pending to assess LVEF, significant interval changes.  Remains borderline hypotensive requiring Levophed support --wean as tolerated  Syncopal episode could be secondary to hypotension.  Will add GDMT cautiously as tolerated based on blood pressure.  Continue with ASA 81 mg, Effient 10 mg, Lipitor 40 mg    Subjective:   Patient seen and examined. Overnight events reviewed.     Objective:     Vitals: Blood pressure 104/51, pulse 86, temperature 98.8 °F (37.1 °C), temperature source Oral, resp. rate 18, height 5' 9\" (1.753 m), weight 95.7 kg (211 lb), SpO2 96%., Body mass index is 31.16 kg/m².,   Orthostatic Blood Pressures      Flowsheet Row Most Recent Value   Blood Pressure 104/51 filed at 05/18/2025 1209   Patient Position - Orthostatic VS Lying filed at 05/17/2025 1617              Intake/Output Summary (Last 24 hours) at 5/18/2025 1248  Last data filed at 5/18/2025 1200  Gross per 24 hour   Intake 1334.19 ml   Output 2425 ml   Net -1090.81 ml         Physical Exam:    GEN: Asad Galloway appears well, alert and oriented x 3, pleasant and cooperative   HEENT: Sclera anicteric, conjunctivae pink, mucous membranes moist. Oropharynx clear.   NECK: supple, no significant JVD, Trachea midline, no thyromegaly.   HEART: regular rhythm, " normal S1 and S2, no murmurs, clicks, gallops or rubs   LUNGS: clear to auscultation bilaterally; no wheezes, rales, or rhonchi. No increased work of breathing or signs of respiratory distress.   ABDOMEN: Soft, nontender, nondistended  EXTREMITIES: Skin warm and well perfused, no clubbing, cyanosis, or edema.  NEURO: No focal findings. Normal speech. Mood and affect normal.   SKIN: Normal without suspicious lesions on exposed skin.      Medications:    Current Medications[1]     Labs & Results:        Results from last 7 days   Lab Units 05/18/25  1139 05/18/25  0432 05/18/25  0024 05/17/25  1700 05/17/25  0502 05/17/25  0334   WBC Thousand/uL  --  6.72  --   --  6.11 4.96   HEMOGLOBIN g/dL 9.5* 8.5* 8.6*   < > 9.8* 9.7*   HEMATOCRIT % 29.6* 27.3* 27.2*   < > 31.0* 31.1*   PLATELETS Thousands/uL  --  77*  --   --  95* 91*    < > = values in this interval not displayed.     Results from last 7 days   Lab Units 05/17/25  1029   TRIGLYCERIDES mg/dL 41     Results from last 7 days   Lab Units 05/18/25  0432 05/17/25  0828 05/17/25  0502 05/17/25  0334 05/17/25  0334 05/17/25  0305   POTASSIUM mmol/L 4.6 3.8 5.2   < > 5.0  --    CHLORIDE mmol/L 94* 100 93*   < > 92*  --    CO2 mmol/L 31 27 29   < > 28  --    CO2, I-STAT mmol/L  --   --   --   --   --  27   BUN mg/dL 24 30* 32*   < > 32*  --    CREATININE mg/dL 4.34* 4.31* 4.94*   < > 5.02*  --    CALCIUM mg/dL 7.9* 6.5* 7.7*   < > 7.8*  --    ALK PHOS U/L  --   --  124*  --  131*  --    ALT U/L  --   --  8  --  8  --    AST U/L  --   --  11*  --  11*  --    GLUCOSE, ISTAT mg/dl  --   --   --   --   --  152*    < > = values in this interval not displayed.     Results from last 7 days   Lab Units 05/18/25  0814 05/18/25  0050 05/17/25  1850 05/17/25  1029 05/17/25  0334   INR   --   --   --  1.58* 1.47*   PTT seconds 40* 122* 61* 116* 34     Results from last 7 days   Lab Units 05/18/25  0432 05/17/25  0828 05/17/25  0502   MAGNESIUM mg/dL 2.1 1.7* 2.1           [1]    Current Facility-Administered Medications:     acetaminophen (TYLENOL) oral suspension 650 mg, 650 mg, Oral, Q6H PRN, BRITTANY Luna, 650 mg at 05/18/25 0645    aspirin chewable tablet 81 mg, 81 mg, Oral, Daily, Belén Calle MD, 81 mg at 05/18/25 0805    atorvastatin (LIPITOR) tablet 40 mg, 40 mg, Oral, Daily With Dinner, Aniyah Guerrero DO, 40 mg at 05/17/25 1623    [COMPLETED] calcium gluconate 2 g in sodium chloride 0.9% 100 mL (premix), 2 g, Intravenous, Once, Stopped at 05/18/25 0859 **FOLLOWED BY** calcium gluconate 2 g in sodium chloride 0.9% 100 mL (premix), 2 g, Intravenous, Once, Belén Calle MD    cefepime (MAXIPIME) 1,000 mg in dextrose 5 % 50 mL IVPB, 1,000 mg, Intravenous, Q24H, BRITTANY Durbin, Last Rate: 100 mL/hr at 05/18/25 0606, 1,000 mg at 05/18/25 0606    chlorhexidine (PERIDEX) 0.12 % oral rinse 15 mL, 15 mL, Mouth/Throat, Q12H MICH, Willard Rodriguez MD, 15 mL at 05/18/25 0806    dexmedeTOMIDine (Precedex) 400 mcg in sodium chloride 0.9% 100 mL, 0.1-0.7 mcg/kg/hr, Intravenous, Titrated, Aniyah Guerrero DO    heparin (porcine) 25,000 units in 0.45% NaCl 250 mL infusion (premix), 3-20 Units/kg/hr (Order-Specific), Intravenous, Titrated, Chanel Eller MD, Last Rate: 10.9 mL/hr at 05/18/25 0859, 12.1 Units/kg/hr at 05/18/25 0859    heparin (porcine) injection 2,000 Units, 2,000 Units, Intravenous, Q6H PRN, Chanel Eller MD    heparin (porcine) injection 4,000 Units, 4,000 Units, Intravenous, Q6H PRN, Chanel Eller MD, 4,000 Units at 05/18/25 0905    [Held by provider] metoprolol succinate (TOPROL-XL) 24 hr tablet 25 mg, 25 mg, Oral, BID, Aniyah Guerrero DO    midodrine (PROAMATINE) tablet 10 mg, 10 mg, Oral, Before Dialysis, Chanel Eller MD, 10 mg at 05/18/25 0711    midodrine (PROAMATINE) tablet 10 mg, 10 mg, Oral, TID AC, BRITTANY Luna    midodrine (PROAMATINE) tablet 10 mg, 10 mg, Oral, Before Dialysis, Joselyn Reyes Bahamonde, MD,  10 mg at 05/18/25 1002    NOREPINEPHRINE 4 MG  ML NSS (CMPD ORDER) infusion, 1-30 mcg/min, Intravenous, Titrated, Chanel Eller MD, Last Rate: 3.8 mL/hr at 05/18/25 1209, 1 mcg/min at 05/18/25 1209    pantoprazole (PROTONIX) injection 40 mg, 40 mg, Intravenous, Q12H MICH, BRITTANY Durbin, 40 mg at 05/18/25 0806    prasugrel (EFFIENT) tablet 10 mg, 10 mg, Oral, Daily, Belén Calle MD, 10 mg at 05/18/25 0807    [START ON 5/19/2025] vancomycin (VANCOCIN) IVPB (premix in dextrose) 750 mg 150 mL, 10 mg/kg (Adjusted), Intravenous, Once per day on Monday Wednesday Friday, Joselyn Reyes Bahamonde, MD

## 2025-05-18 NOTE — ASSESSMENT & PLAN NOTE
Was found unresponsive  Currently intubated with plan for extubation after dialysis  Management as per ICU team  Sepsis workup as per ICU team, currently on antibiotics

## 2025-05-18 NOTE — PROGRESS NOTES
Progress Note - Critical Care/ICU   Name: Asad Galloway 65 y.o. male I MRN: [id number]  Unit/Bed#: ICU 03 I Date of Admission: 5/17/2025   Date of Service: 5/18/2025 I Hospital Day: 1      Assessment & Plan  Acute hypoxic respiratory failure (HCC)  Unresponsive on arrival to the ED. Intubated for airway protection and for hypoxia.    -Mechanical ventilation volume control    14/500/100/6  -SBT and extubation as appropriate  ESRD on dialysis (HCC)  Lab Results   Component Value Date    EGFR 13 05/18/2025    EGFR 13 05/17/2025    EGFR 11 05/17/2025    CREATININE 4.34 (H) 05/18/2025    CREATININE 4.31 (H) 05/17/2025    CREATININE 4.94 (H) 05/17/2025     Hx of HD 3x per week.  Access, AV fistula.    -Trend renal functioning  -Plan for HD if BP stabilizes  Renal Diet  Fluid restriction 1-1.5L/d  Adjust medications to GFR<10  Avoid opioids   Patent is chronically hypotensive , currently on NE. Will attempt iHD . If he doesn't tolerate then we will need to do CRRT  Elevated troponin  Noted to have troponin of 88 in the ED. cardiology consulted, suspect Non-MI troponin elevation.  EKG WNL  Altered mental status  Presented to the ED via EMS after being found unresponsive by family at home. Unknown amount of downtime. Noted to be on HD 3x per week which recently switched to home HD with less removal of fluid. Usually has 4.5-5 L removed and now only about 1 L removed per session. Dry weight is 94 kg as per family and is 107 kg in ED. Elevated lactate.     -Intubated for airway protection  -Neuro checks Q1H  -Septic workup started, follow-up blood cultures  -Started on vanco/cefepime for empiric coverage   Hypothermia  Hypothermic after rectal temperature in ED.      -Bob hugger as needed.  Sepsis (HCC)  Fever, elevated heart rate, tachypnea.  CT concerning for interstitial pneumonia.   Recent Labs     05/17/25  0334 05/17/25  0431   LACTICACID 3.1* 2.8*     On cefepime 1 g every 24 hours  Anemia  Chronic anemia, trend  CBC  Hypotension  Wean levo as needed maintain MAP greater than 65.  Ischemic cardiomyopathy  CHF with acute exacerbation, LVEF 30 to 35%.  Acute volume overload due to poor HD sessions.  Up 10K from dry weight.  Aortic stenosis, moderate to severe, ongoing TAVR workup [place]   Chronic systolic heart failure (HCC)  Wt Readings from Last 3 Encounters:   05/17/25 96 kg (211 lb 10.3 oz)   05/02/25 94.3 kg (208 lb)   05/01/25 94.8 kg (209 lb)     Metoprolol, entresto on hold due to low blood pressure  CAD, multiple vessel  3/17/25 ([place], [place]) - [facility] 90% pLAD stenosis, severe plaque burden, in-stent restenosis with areas of underexpansion and calcified nodules in vision between overlap of stents --> POBA with drug-coated balloon for in-stent restenosis, no new stent  Status post PCI to LAD  Continue aspirin, prasugrel with recent PCI  Note: He is noted to be Plavix nonresponder at OSH with history of in-stent restenosis  Disposition: Critical care    ICU Core Measures     Vented Patient  VAP Bundle  VAP bundle ordered     A: Assess, Prevent, and Manage Pain Has pain been assessed? Yes  Need for changes to pain regimen? No   B: Both Spontaneous Awakening Trials (SATs) and Spontaneous Breathing Trials (SBTs) Plan to perform spontaneous awakening trial today? Modified and patient remained on precedex   Plan to perform spontaneous breathing trial today? Yes   Obvious barriers to extubation? No   C: Choice of Sedation RASS Goal: 0 Alert and Calm  Need for changes to sedation or analgesia regimen? No   D: Delirium CAM-ICU: Negative   E: Early Mobility  Plan for early mobility? N/A   F: Family Engagement Plan for family engagement today? Yes       Antibiotic Review: Continue broad spectrum secondary to severity of illness.   Awaiting culture result    Prophylaxis:  VTE VTE covered by:  heparin (porcine), Intravenous, 8.1 Units/kg/hr at 05/18/25 0243  heparin (porcine), Intravenous  heparin  (porcine), Intravenous       Stress Ulcer  covered bypantoprazole (PROTONIX) injection 40 mg [[id number]]         24 Hour Events : 65M hx CHF, ESRD, DM2, CAD, presenting with AMS and found down requiring intubation. Brought from home for sudden onset unresponsiveness at 1am yesterday, hypothermic, mental status did not improve with narcan so he was intubated.  Dialysis yesterday, plan for dialysis today patient received 50 mcg of fentanyl for agitation this a.m.  Patient appeared somnolent, unable to follow intermittent commands.  Subjective       Objective :                   Vitals I/O      Most Recent Min/Max in 24hrs   Temp (!) 100.8 °F (38.2 °C) Temp  Min: 96.8 °F (36 °C)  Max: 101.3 °F (38.5 °C)   Pulse 77 Pulse  Min: 59  Max: 91   Resp 15 Resp  Min: 13  Max: 35   BP (!) 80/40 BP  Min: 65/32  Max: 127/55   O2 Sat 100 % SpO2  Min: 90 %  Max: 100 %      Intake/Output Summary (Last 24 hours) at 5/18/2025 0743  Last data filed at 5/18/2025 0600  Gross per 24 hour   Intake 1689.35 ml   Output 2925 ml   Net -1235.65 ml       Diet NPO    Invasive Monitoring   Arterial Line  [place] BP    No data recorded   MAP    No data recorded           Physical Exam   Physical Exam  Cardiovascular:      Rate and Rhythm: Normal rate and regular rhythm.      Heart sounds: Normal heart sounds.   Abdominal: General: There is distension.     Palpations: Abdomen is soft.      Tenderness: There is no abdominal tenderness.   Constitutional:       Appearance: He is ill-appearing.      Interventions: He is sedated, intubated and restrained.   Pulmonary:      Effort: Tachypnea present. No respiratory distress. He is intubated.   Neurological:      Mental Status: He is somnolent and unresponsive.          Diagnostic Studies        Lab Results: I have reviewed the following results:     Medications:  Scheduled PRN   aspirin, 81 mg, Daily  atorvastatin, 40 mg, Daily With Dinner  calcium gluconate, 2 g, Once   Followed by  calcium gluconate, 2 g,  Once  cefepime, 1,000 mg, Q24H  chlorhexidine, 15 mL, Q12H MICH  [Held by provider] metoprolol succinate, 25 mg, BID  pantoprazole, 40 mg, Q12H MICH  prasugrel, 10 mg, Daily  [START ON 5/19/2025] vancomycin, 10 mg/kg (Adjusted), Once per day on Monday Wednesday Friday      acetaminophen, 650 mg, Q6H PRN  fentaNYL, 50 mcg, Q4H PRN  heparin (porcine), 2,000 Units, Q6H PRN  heparin (porcine), 4,000 Units, Q6H PRN  midodrine, 10 mg, Before Dialysis       Continuous    dexmedetomidine, 0.1-0.7 mcg/kg/hr  heparin (porcine), 3-20 Units/kg/hr (Order-Specific), Last Rate: 8.1 Units/kg/hr (05/18/25 0243)  norepinephrine, 1-30 mcg/min, Last Rate: 3 mcg/min (05/18/25 0734)         Labs:   CBC    Recent Labs     05/17/25  0502 05/17/25  1700 05/18/25  0024 05/18/25  0432   WBC 6.11  --   --  6.72   HGB 9.8*   < > 8.6* 8.5*   HCT 31.0*   < > 27.2* 27.3*   PLT 95*  --   --  77*    < > = values in this interval not displayed.     BMP    Recent Labs     05/17/25  0828 05/18/25  0432   SODIUM 136 136   K 3.8 4.6    94*   CO2 27 31   AGAP 9 11   BUN 30* 24   CREATININE 4.31* 4.34*   CALCIUM 6.5* 7.9*       Coags    Recent Labs     05/17/25  0334 05/17/25  1029 05/17/25  1850 05/18/25  0050   INR 1.47* 1.58*  --   --    PTT 34 116* 61* 122*        Additional Electrolytes  Recent Labs     05/17/25  0828 05/17/25  1850 05/18/25  0024 05/18/25  0432   MG 1.7*  --   --  2.1   PHOS 4.4*  --   --  3.8   CAIONIZED 0.79*   < > 0.93* 0.89*    < > = values in this interval not displayed.          Blood Gas    Recent Labs     05/17/25  0537   PHART 7.545*   VPH4JEX 33.9*   PO2ART 427.3*   FDR4SBC 28.7*   BEART 6.2   SOURCE Radial, Right     Recent Labs     05/17/25  0334 05/17/25  0537   PHVEN 7.458*  --    VBY3STJ 36.0*  --    PO2VEN 83.4*  --    TPI7FIR 24.9  --    BEVEN 1.2  --    V0OTTBN 92.0*  --    SOURCE  --  Radial, Right    LFTs  Recent Labs     05/17/25 0334 05/17/25  0502   ALT 8 8   AST 11* 11*   ALKPHOS 131* 124*   ALB 3.8 3.8    TBILI 0.81 1.08*       Infectious  Recent Labs     05/17/25  0334   PROCALCITONI 0.23      Recent Labs     05/17/25  0448 05/17/25  0551   BLOODCX Received in Microbiology Lab. Culture in Progress. Received in Microbiology Lab. Culture in Progress.     Glucose  Recent Labs     05/17/25  0334 05/17/25  0502 05/17/25  0828 05/18/25  0432   GLUC 147* 117 93 64*      Neuro: No known seizures.  On Precedex  -  fentanyl prn, required once this morning.    CV: CHF with acute exacerbation. Shock - likely septic, low blood pressure postdialysis and fluid removal in the setting of severe AS and cardiomyopathy.  Baseline EF 30 to 35%.baseline MAPS 60s  - will give midodrine home dose pre HD. levo for MAP goal 65  - Cards following.  Follow-up echo     Resp: Acute hypoxic resp failure. Likely due to volume overload and possible PNA  -continue abx vanc/cef for PNA  - continue vent on minimal settings  --- can consider extubation this afternoon depending on volume removal with HD  - sputum/trach cultures, fu blood cultures      GI: Hold tube feeds     Renal:- HD session today.  Nephro following.  Consult palliative care?     Heme: chronic anemia - stable  DVT (recent) - hold eliquis, start heparin drip     ID: Sepsis, possible PNA  - vanc/cefepime  - follow up cultures     Endo: ISS     MSK: chronic wounds - wound care     Lines: IV, ETT, AV fistula on L      Dispo: ICU

## 2025-05-18 NOTE — RESPIRATORY THERAPY NOTE
05/18/25 1051   Respiratory Assessment   Resp Comments placed on PS 6 peep6   Vent Information   Vent type     Vent Mode CPAP/PS Spont   $ Pulse Oximetry Spot Check Charge Completed   CPAP/PS Spont Settings   FIO2 (%) 40 %   PEEP (cmH2O) 6 cmH2O   Pressure Support (cmH2O) 6 cmH20   Trigger Sensitivity Flow (lpm) 3 LPM   Esens % 25 %   Humidification Heater   CPAP/PS Spont Actuals   Resp Rate (BPM) 11 BPM   VT (mL) 537 mL   MV (Obs) 5.83   MAP (cmH2O) 9.4 cmH2O   Peak Pressure (cmH2O) 12 cmH2O   I/E Ratio (Obs) 1:6   CPAP/PS Spont Alarms   High Peak Pressure (cmH20) 40 cmH2O   High Resp Rate (BPM) 40 BPM   High MV (L/min) 16 L/min   Low MV (L/min) 3.5 L/min   High Angelina VTE (mL) 900 mL   Low Angelina VTE (mL) 250 mL   High SPONT VTE (mL) 900 mL   Low Spont VTE (mL) 250 mL   CPAP/PS Spont Apnea Settings   Resp Rate (BPM) 15 BPM   VT (mL) 500 mL   FIO2 (%) 100 %   Apnea Time (s) 60 S   Apnea Flow (LPM) 60 LPM

## 2025-05-18 NOTE — ASSESSMENT & PLAN NOTE
CT remarkable for interstitial pneumonia 5/17    Fever, elevated heart rate, tachypnea.  CT concerning for interstitial pneumonia.   Recent Labs     05/17/25  0334 05/17/25  0431   LACTICACID 3.1* 2.8*     On cefepime 1 g every 24 hours

## 2025-05-18 NOTE — ASSESSMENT & PLAN NOTE
Noted to have troponin of 88 in the ED. cardiology consulted, suspect Non-MI troponin elevation.  EKG WNL

## 2025-05-18 NOTE — ASSESSMENT & PLAN NOTE
Fever, elevated heart rate, tachypnea.  CT concerning for interstitial pneumonia.   Recent Labs     05/17/25  0334 05/17/25  0431   LACTICACID 3.1* 2.8*     On cefepime 1 g every 24 hours

## 2025-05-18 NOTE — PROGRESS NOTES
Progress Note - Nephrology   Name: Asad Galloway 65 y.o. male I MRN: [id number]  Unit/Bed#: ICU 03 I Date of Admission: 5/17/2025   Date of Service: 5/18/2025 I Hospital Day: 1     Assessment & Plan  ESRD on dialysis (HCC)  Lab Results   Component Value Date    EGFR 13 05/18/2025    EGFR 13 05/17/2025    EGFR 11 05/17/2025    CREATININE 4.34 (H) 05/18/2025    CREATININE 4.31 (H) 05/17/2025    CREATININE 4.94 (H) 05/17/2025   #ESRD on HD MWF:  Dialysis unit/days: [facility] [place]   Access:AV fistula  EDW 95 kg, unable to achieve dry weight as per family because he drinks too much water.  Had dialysis yesterday well-tolerated UF 2 L  Plan for UF today and regular hemodialysis tomorrow  Fluid restriction 1-1.5L/d  Adjust medications to GFR<10  Avoid opioids   Altered mental status  Was found unresponsive  Currently intubated with plan for extubation after dialysis  Management as per ICU team  Sepsis workup as per ICU team, currently on antibiotics  Anemia  Current hemoglobin: 8.5 mg/dL  Treatment:  Outpatient Mircera  Transfuse for hemoglobin less than 7.0 per primary service    Hypotension  Volume: Fluid overload  Blood pressure: Chronic hypotension, BP 89/44  Tolerated dialysis well yesterday  Plan for UF today, can get midodrine or vasopressors as per ICU team     I have reviewed the nephrology recommendations including UF today and regular hemodialysis tomorrow, with ICU team, and we are in agreement with renal plan including the information outlined above. I have discussed the above management plan in detail with the primary service.     Subjective   Brief History of Admission - 65 y.o. man  with PMH of ESRD on HD MWF  via AV fistula, anemia, hypertension, CHF, ischemic cardiomyopathy present unresponsive. Nephrolgy is consulted for management of ESRD     Patient is awake, remains intubated with family at bedside.    Objective :  Temp:  [96.8 °F (36 °C)-101.3 °F (38.5 °C)] 100.6 °F (38.1 °C)  HR:  [59-91] 84  BP:  ()/(30-55) 88/43  Resp:  [13-35] 14  SpO2:  [90 %-100 %] 99 %  O2 Device: None (Room air)    Current Weight: Weight - Scale: 96 kg (211 lb 10.3 oz)  First Weight: Weight - Scale: 107 kg (235 lb 7.2 oz)  I/O         05/16 0701  05/17 0700 05/17 0701  05/18 0700 05/18 0701 05/19 0700    I.V. (mL/kg)  716.9 (7.5)     IV Piggyback  808.3     Total Intake(mL/kg)  1525.2 (15.9)     Emesis/NG output  800     Other  2000     Total Output  2800     Net  -1274.8            Unmeasured Stool Occurrence  1 x           Physical Exam  General:  no acute distress at this time, intubated  Skin:  No acute rash  Eyes:  No scleral icterus and noninjected  ENT:  mucous membranes moist  Neck:  no carotid bruits  Chest:  Clear to auscultation percussion, good respiratory effort, no use of accessory respiratory muscles  CVS:  Regular rate and rhythm without rub   Abdomen:  soft and nontender   Extremities: lower extremity edema  Neuro:  No gross focality  Psych:  Alert  Dialysis access: AV fistula    Medications:  Current Medications[1]      Lab Results: I have reviewed the following results:  Results from last 7 days   Lab Units 05/18/25  0432 05/18/25  0024 05/17/25  1850 05/17/25  1814 05/17/25  1700 05/17/25  0828 05/17/25  0502 05/17/25  0334 05/17/25  0305 05/14/25  1137   WBC Thousand/uL 6.72  --   --   --   --   --  6.11 4.96  --  5.29   HEMOGLOBIN g/dL 8.5* 8.6* 9.5* 8.8* 7.6*  --  9.8* 9.7*  --  9.7*   I STAT HEMOGLOBIN g/dl  --   --   --   --   --   --   --   --  11.2*  --    HEMATOCRIT % 27.3* 27.2* 30.2* 29.2* 26.2*  --  31.0* 31.1*  --  31.5*   HEMATOCRIT, ISTAT %  --   --   --   --   --   --   --   --  33*  --    PLATELETS Thousands/uL 77*  --   --   --   --   --  95* 91*  --  99*   POTASSIUM mmol/L 4.6  --   --   --   --  3.8 5.2 5.0  --   --    CHLORIDE mmol/L 94*  --   --   --   --  100 93* 92*  --   --    CO2 mmol/L 31  --   --   --   --  27 29 28  --   --    CO2, I-STAT mmol/L  --   --   --   --   --   --   --    "--  27  --    BUN mg/dL 24  --   --   --   --  30* 32* 32*  --   --    CREATININE mg/dL 4.34*  --   --   --   --  4.31* 4.94* 5.02*  --   --    CALCIUM mg/dL 7.9*  --   --   --   --  6.5* 7.7* 7.8*  --   --    MAGNESIUM mg/dL 2.1  --   --   --   --  1.7* 2.1 2.1  --   --    PHOSPHORUS mg/dL 3.8  --   --   --   --  4.4* 5.5*  --   --   --    ALBUMIN g/dL  --   --   --   --   --   --  3.8 3.8  --   --    GLUCOSE, ISTAT mg/dl  --   --   --   --   --   --   --   --  152*  --        Administrative Statements     Portions of the record may have been created with voice recognition software. Occasional wrong word or \"sound a like\" substitutions may have occurred due to the inherent limitations of voice recognition software. Read the chart carefully and recognize, using context, where substitutions have occurred.If you have any questions, please contact the dictating provider.         [1]   Current Facility-Administered Medications:     acetaminophen (TYLENOL) oral suspension 650 mg, 650 mg, Oral, Q6H PRN, BRITTANY Luna, 650 mg at 05/18/25 0645    aspirin chewable tablet 81 mg, 81 mg, Oral, Daily, Belén Calle MD    atorvastatin (LIPITOR) tablet 40 mg, 40 mg, Oral, Daily With Dinner, Aniyah Guerrero DO, 40 mg at 05/17/25 1623    calcium gluconate 2 g in sodium chloride 0.9% 100 mL (premix), 2 g, Intravenous, Once, Last Rate: 100 mL/hr at 05/18/25 0645, 2 g at 05/18/25 0645 **FOLLOWED BY** calcium gluconate 2 g in sodium chloride 0.9% 100 mL (premix), 2 g, Intravenous, Once, Belén Calle MD    cefepime (MAXIPIME) 1,000 mg in dextrose 5 % 50 mL IVPB, 1,000 mg, Intravenous, Q24H, BRITTANY Durbin, Last Rate: 100 mL/hr at 05/18/25 0606, 1,000 mg at 05/18/25 0606    chlorhexidine (PERIDEX) 0.12 % oral rinse 15 mL, 15 mL, Mouth/Throat, Q12H MICH, Willard Rodriguez MD, 15 mL at 05/17/25 2040    dexmedeTOMIDine (Precedex) 400 mcg in sodium chloride 0.9% 100 mL, 0.1-0.7 mcg/kg/hr, Intravenous, Titrated, Aniyah " DO Cesar    fentaNYL injection 50 mcg, 50 mcg, Intravenous, Q4H PRN, Willard Rodriguez MD, 50 mcg at 05/18/25 0430    heparin (porcine) 25,000 units in 0.45% NaCl 250 mL infusion (premix), 3-20 Units/kg/hr (Order-Specific), Intravenous, Titrated, Chanel Eller MD, Last Rate: 7.3 mL/hr at 05/18/25 0243, 8.1 Units/kg/hr at 05/18/25 0243    heparin (porcine) injection 2,000 Units, 2,000 Units, Intravenous, Q6H PRN, Chanel Eller MD    heparin (porcine) injection 4,000 Units, 4,000 Units, Intravenous, Q6H PRN, Chanel Eller MD    [Held by provider] metoprolol succinate (TOPROL-XL) 24 hr tablet 25 mg, 25 mg, Oral, BID, Aniyah Guerrero DO    midodrine (PROAMATINE) tablet 10 mg, 10 mg, Oral, Before Dialysis, Chanel Eller MD, 10 mg at 05/17/25 1202    NOREPINEPHRINE 4 MG  ML NSS (CMPD ORDER) infusion, 1-30 mcg/min, Intravenous, Titrated, Chanel Eller MD, Last Rate: 3.8 mL/hr at 05/18/25 0606, 1 mcg/min at 05/18/25 0606    pantoprazole (PROTONIX) injection 40 mg, 40 mg, Intravenous, Q12H [facility], BRITTANY Durbin    prasugrel (EFFIENT) tablet 10 mg, 10 mg, Oral, Daily, Belén Calle MD    [START ON 5/19/2025] vancomycin (VANCOCIN) IVPB (premix in dextrose) 750 mg 150 mL, 10 mg/kg (Adjusted), Intravenous, Once per day on Monday Wednesday Friday, Joselyn Reyes Bahamonde, MD

## 2025-05-18 NOTE — SEPSIS NOTE
Sepsis Note   Asad Galloway 65 y.o. male MRN: [id number]  Unit/Bed#: ICU 03 Encounter: [id number]       Initial Sepsis Screening       Row Name 05/17/25 0559                Is the patient's history suggestive of a new or worsening infection? Yes (Proceed)  -TQ        Suspected source of infection suspect infection, source unknown  -TQ        Indicate SIRS criteria Hyperthemia > 38.3C (100.9F) OR Hypothermia <36C (96.8F)  -TQ        Are two or more of the above signs & symptoms of infection both present and new to the patient? No  -TQ        Assess for evidence of organ dysfunction: Are any of the below criteria present within 6 hours of suspected infection and SIRS criteria that are NOT considered to be chronic conditions? --        Fluid Resuscitation: --        The 30 mL/kg fluid bolus was not given to the patient despite having hypotension, a lactate of >= 4 mmol/L, or documentation of septic shock secondary to: --        Instead of the 30 ml/kg fluid bolus, the following volume of crystalloid fluid will be ordered: --        Of the following fluid type: --        Is the patient persistently hypotensive in the hour after fluid bolus administration? If yes, patient meets criteria for vasopressor use. --                  User Key  (r) = Recorded By, (t) = Taken By, (c) = Cosigned By      Initials Name Provider Type    TQ Willard Rodriguez MD Resident                   Initial Sepsis Screening       Row Name 05/18/25 1717 05/18/25 0727 05/17/25 0559             Initial Sepsis Assessment    Is the patient's history suggestive of a new or worsening infection? Yes (Proceed)  -SS Yes (Proceed)  -LN Yes (Proceed)  -TQ      Suspected source of infection pneumonia  -SS pneumonia  -LN suspect infection, source unknown  -TQ      Indicate SIRS criteria Tachypnea > 20 resp per min;Tachycardia > 90 bpm;Leukocytosis (WBC > 40928 IJL) OR Leukopenia (WBC <4000 IJL) OR Bandemia (WBC >10% bands)  -SS Hyperthemia > 38.3C (100.9F) OR  Hypothermia <36C (96.8F);Tachycardia > 90 bpm;Tachypnea > 20 resp per min  -LN Hyperthemia > 38.3C (100.9F) OR Hypothermia <36C (96.8F)  -TQ      Are two or more of the above signs & symptoms of infection both present and new to the patient? Yes (Proceed)  -SS No  -LN No  -TQ         If the answer is yes to both above questions, suspicion of sepsis is present. Proceed with algorithm to determine if severe sepsis or septic shock criteria are met.    Assess for evidence of organ dysfunction: Are any of the below criteria present within 6 hours of suspected infection and SIRS criteria that are NOT considered to be chronic conditions? SBP < 90  -SS -- --         Based on the above information, the patient meets criteria for SEPTIC SHOCK. CALL A SEPSIS ALERT. Use sepsis order set. If the lactate is > or equal to 4, or the SBP is <90, give 30 ml/kg cystalloid IV fluid bolus or document exclusion criteria.    Date of presentation of septic shock -- -- --      Time of presentation of septic shock -- -- --      Fluid Resuscitation: A lesser volume than 30 ml/kg IV fluid will be given  -SS -- --      The 30 mL/kg fluid bolus was not given to the patient despite having hypotension, a lactate of >= 4 mmol/L, or documentation of septic shock secondary to: Renal Failure  -SS -- --      Instead of the 30 ml/kg fluid bolus, the following volume of crystalloid fluid will be ordered: 1L  given in ED  -SS -- --      Of the following fluid type: NSS  -SS -- --      Is the patient persistently hypotensive in the hour after fluid bolus administration? If yes, patient meets criteria for vasopressor use. YES  was placed on vasopressors  -SS -- --                User Key  (r) = Recorded By, (t) = Taken By, (c) = Cosigned By      Initials Name Provider Type    SS BRITTANY Luna Nurse Practitioner    TQ Willard Rodriguez MD Resident    LN Belén Calle MD Resident                         Body mass index is 31.16 kg/m².  Wt Readings from  Last 1 Encounters:   05/18/25 95.7 kg (211 lb)     IBW (Ideal Body Weight): 70.7 kg    Ideal body weight: 70.7 kg (155 lb 13.8 oz)  Adjusted ideal body weight: 80.7 kg (177 lb 14.7 oz)      Patient presented to the hospital after being found unresponsive at home. Admitted in undifferentated shock. Cardiogenic vs septic.   Source: R/O PNA  Given lesser volume resuscitation in the setting of ERSD on HD.   Started on vasopressors with ABX in place.   BP has been low as an outpatient limiting volume removal as an outpatient.

## 2025-05-18 NOTE — ASSESSMENT & PLAN NOTE
Presented to the ED via EMS after being found unresponsive by family at home. Unknown amount of downtime. Noted to be on HD 3x per week which recently switched to home HD with less removal of fluid. Usually has 4.5-5 L removed and now only about 1 L removed per session. Dry weight is 94 kg as per family and is 107 kg in ED. Elevated lactate.     -Intubated for airway protection  -Neuro checks Q1H  -Septic workup started, follow-up blood cultures  -Started on vanco/cefepime for empiric coverage

## 2025-05-18 NOTE — PLAN OF CARE
Problem: Nutrition/Hydration-ADULT  Goal: Nutrient/Hydration intake appropriate for improving, restoring or maintaining nutritional needs  Description: Monitor and assess patient's nutrition/hydration status for malnutrition. Collaborate with interdisciplinary team and initiate plan and interventions as ordered.  Monitor patient's weight and dietary intake as ordered or per policy. Utilize nutrition screening tool and intervene as necessary. Determine patient's food preferences and provide high-protein, high-caloric foods as appropriate.     INTERVENTIONS:  - Monitor oral intake, urinary output, labs, and treatment plans  - Assess nutrition and hydration status and recommend course of action  - Evaluate amount of meals eaten  - Assist patient with eating if necessary   - Allow adequate time for meals  - Recommend/ encourage appropriate diets, oral nutritional supplements, and vitamin/mineral supplements  - Order, calculate, and assess calorie counts as needed  - Recommend, monitor, and adjust tube feedings and TPN/PPN based on assessed needs  - Assess need for intravenous fluids  - Provide specific nutrition/hydration education as appropriate  - Include patient/family/caregiver in decisions related to nutrition  Outcome: Progressing     Problem: METABOLIC, FLUID AND ELECTROLYTES - ADULT  Goal: Electrolytes maintained within normal limits  Description: INTERVENTIONS:  - Monitor labs and assess patient for signs and symptoms of electrolyte imbalances  - Administer electrolyte replacement as ordered  - Monitor response to electrolyte replacements, including repeat lab results as appropriate  - Instruct patient on fluid and nutrition as appropriate  Outcome: Progressing  Goal: Fluid balance maintained  Description: INTERVENTIONS:  - Monitor labs   - Monitor I/O and WT  - Instruct patient on fluid and nutrition as appropriate  - Assess for signs & symptoms of volume excess or deficit  Outcome: Progressing

## 2025-05-18 NOTE — ASSESSMENT & PLAN NOTE
Lab Results   Component Value Date    EGFR 13 05/18/2025    EGFR 13 05/17/2025    EGFR 11 05/17/2025    CREATININE 4.34 (H) 05/18/2025    CREATININE 4.31 (H) 05/17/2025    CREATININE 4.94 (H) 05/17/2025   #ESRD on HD MWF:  Dialysis unit/days: [facility]   Access:AV fistula  EDW 95 kg, unable to achieve dry weight as per family because he drinks too much water.  Had dialysis yesterday well-tolerated UF 2 L  Plan for UF today and regular hemodialysis tomorrow  Fluid restriction 1-1.5L/d  Adjust medications to GFR<10  Avoid opioids

## 2025-05-18 NOTE — QUICK NOTE
1730: Peripheral IV on patient's right antecubital vein was removed and oozing small volume blood.  Pressure was held to the area plus Surgicel, gauze, and pressure bandage with adequate hemostasis.  Area was marked.  Patient denied pain.

## 2025-05-18 NOTE — ASSESSMENT & PLAN NOTE
Lab Results   Component Value Date    EGFR 13 05/18/2025    EGFR 13 05/17/2025    EGFR 11 05/17/2025    CREATININE 4.34 (H) 05/18/2025    CREATININE 4.31 (H) 05/17/2025    CREATININE 4.94 (H) 05/17/2025     Hx of HD 3x per week.  Access, AV fistula.    -Trend renal functioning  -Plan for HD if BP stabilizes  Renal Diet  Fluid restriction 1-1.5L/d  Adjust medications to GFR<10  Avoid opioids   Patent is chronically hypotensive , currently on NE. Will attempt iHD . If he doesn't tolerate then we will need to do CRRT

## 2025-05-18 NOTE — ASSESSMENT & PLAN NOTE
CHF with acute exacerbation, LVEF 30 to 35%.  Acute volume overload due to poor HD sessions.  Up 10K from dry weight.  Aortic stenosis, moderate to severe, ongoing TAVR workup [place]

## 2025-05-18 NOTE — PROGRESS NOTES
Asad Galloway is a 65 y.o. male who is currently ordered Vancomycin IV with management by the Pharmacy Consult service.  Relevant clinical data and objective / subjective history reviewed.  Vancomycin Assessment:  Indication and Goal AUC/Trough: Pneumonia (goal -600, trough >10)  Clinical Status: Critically Ill  Micro:     Renal Function:  SCr: 4.34 mg/dL  CrCl: 19.4 mL/min  Renal replacement: HD, MWF  Days of Therapy: 2  Current Dose: 750mg IV POST HD  Vancomycin Plan:  New Dosing: Continue Current Dose  Estimated AUC: 400-600 mcg*hr/mL  Estimated Trough: > 10 mcg/mL  Next Level: 0521 @ 0600  Renal Function Monitoring: Daily BMP and UOP  Pharmacy will continue to follow closely for s/sx of nephrotoxicity, infusion reactions and appropriateness of therapy.  BMP and CBC will be ordered per protocol. We will continue to follow the patient’s culture results and clinical progress daily.    Aniyah Schultz, Pharmacist

## 2025-05-18 NOTE — ASSESSMENT & PLAN NOTE
Volume: Fluid overload  Blood pressure: Chronic hypotension, BP 89/44  Tolerated dialysis well yesterday  Plan for UF today, can get midodrine or vasopressors as per ICU team

## 2025-05-18 NOTE — ASSESSMENT & PLAN NOTE
Wt Readings from Last 3 Encounters:   05/17/25 96 kg (211 lb 10.3 oz)   05/02/25 94.3 kg (208 lb)   05/01/25 94.8 kg (209 lb)     Metoprolol, entresto on hold due to low blood pressure

## 2025-05-18 NOTE — ASSESSMENT & PLAN NOTE
Current hemoglobin: 8.5 mg/dL  Treatment:  Outpatient Mircera  Transfuse for hemoglobin less than 7.0 per primary service

## 2025-05-18 NOTE — PLAN OF CARE
Ultrafiltration only.  Plan 3000 ml/2500 ml net as ordered.  Treatment review with Dr. Reyes via Epic Chat.            Post-Dialysis RN Treatment Note    Blood Pressure:  Pre 94/54 mm/Hg  Post 115/56 mmHg   EDW:  TBD   Weight:  Pre 96.4 kg   Post None Reported    Mode of weight measurement: Bed Scale   Volume Removed:  2300 ml/1800 ml net   Treatment duration: 120 minutes    NS given:  No    Treatment shortened Yes, describe: Systolic blood pressure 90 with levophed   Medications given during Rx: None Reported   Estimated Kt/V:  None Reported   Access type: AV fistula   Needle Gauge: 15 guage   Access Issues: No    Report called to primary nurse:   Yes     **extubated during treatment      Problem: METABOLIC, FLUID AND ELECTROLYTES - ADULT  Goal: Electrolytes maintained within normal limits  Description: INTERVENTIONS:  - Monitor labs and assess patient for signs and symptoms of electrolyte imbalances  - Administer electrolyte replacement as ordered  - Monitor response to electrolyte replacements, including repeat lab results as appropriate  - Instruct patient on fluid and nutrition as appropriate  Outcome: Progressing  Goal: Fluid balance maintained  Description: INTERVENTIONS:  - Monitor labs   - Monitor I/O and WT  - Instruct patient on fluid and nutrition as appropriate  - Assess for signs & symptoms of volume excess or deficit  Outcome: Progressing

## 2025-05-18 NOTE — ASSESSMENT & PLAN NOTE
3/17/25 ([place], [place]) - [facility] 90% pLAD stenosis, severe plaque burden, in-stent restenosis with areas of underexpansion and calcified nodules in vision between overlap of stents --> POBA with drug-coated balloon for in-stent restenosis, no new stent  Status post PCI to LAD  Continue aspirin, prasugrel with recent PCI  Note: He is noted to be Plavix nonresponder at OSH with history of in-stent restenosis

## 2025-05-19 ENCOUNTER — APPOINTMENT (INPATIENT)
Dept: DIALYSIS | Facility: HOSPITAL | Age: 65
DRG: 208 | End: 2025-05-19
Payer: MEDICARE

## 2025-05-19 PROBLEM — Z51.5 PALLIATIVE CARE BY SPECIALIST: Status: ACTIVE | Noted: 2025-05-19

## 2025-05-19 PROBLEM — R41.82 ALTERED MENTAL STATUS: Status: RESOLVED | Noted: 2025-03-21 | Resolved: 2025-05-19

## 2025-05-19 PROBLEM — Z71.89 GOALS OF CARE, COUNSELING/DISCUSSION: Status: ACTIVE | Noted: 2025-05-19

## 2025-05-19 PROBLEM — J96.01 ACUTE HYPOXIC RESPIRATORY FAILURE (HCC): Status: RESOLVED | Noted: 2023-01-29 | Resolved: 2025-05-19

## 2025-05-19 PROBLEM — T68.XXXA HYPOTHERMIA: Status: RESOLVED | Noted: 2019-08-24 | Resolved: 2025-05-19

## 2025-05-19 PROBLEM — Z99.2 ENCOUNTER FOR DIALYSIS (HCC): Status: ACTIVE | Noted: 2025-05-19

## 2025-05-19 PROBLEM — R79.89 ELEVATED TROPONIN: Status: RESOLVED | Noted: 2023-03-17 | Resolved: 2025-05-19

## 2025-05-19 PROBLEM — R57.9 SHOCK (HCC): Status: ACTIVE | Noted: 2025-05-19

## 2025-05-19 PROBLEM — I35.0 NONRHEUMATIC AORTIC VALVE STENOSIS: Status: ACTIVE | Noted: 2025-05-19

## 2025-05-19 LAB
ANION GAP SERPL CALCULATED.3IONS-SCNC: 12 MMOL/L (ref 4–13)
APTT PPP: 149 SECONDS (ref 23–34)
BASOPHILS # BLD AUTO: 0.03 THOUSANDS/ÂΜL (ref 0–0.1)
BASOPHILS NFR BLD AUTO: 0 % (ref 0–1)
BUN SERPL-MCNC: 36 MG/DL (ref 5–25)
CA-I BLD-SCNC: 0.96 MMOL/L (ref 1.12–1.32)
CALCIUM SERPL-MCNC: 8.1 MG/DL (ref 8.4–10.2)
CHLORIDE SERPL-SCNC: 93 MMOL/L (ref 96–108)
CO2 SERPL-SCNC: 29 MMOL/L (ref 21–32)
CREAT SERPL-MCNC: 6.11 MG/DL (ref 0.6–1.3)
EOSINOPHIL # BLD AUTO: 0.14 THOUSAND/ÂΜL (ref 0–0.61)
EOSINOPHIL NFR BLD AUTO: 2 % (ref 0–6)
ERYTHROCYTE [DISTWIDTH] IN BLOOD BY AUTOMATED COUNT: 16.9 % (ref 11.6–15.1)
FERRITIN SERPL-MCNC: 924 NG/ML (ref 30–336)
GFR SERPL CREATININE-BSD FRML MDRD: 8 ML/MIN/1.73SQ M
GLUCOSE SERPL-MCNC: 125 MG/DL (ref 65–140)
GLUCOSE SERPL-MCNC: 182 MG/DL (ref 65–140)
GLUCOSE SERPL-MCNC: 97 MG/DL (ref 65–140)
HCT VFR BLD AUTO: 27.9 % (ref 36.5–49.3)
HGB BLD-MCNC: 8.7 G/DL (ref 12–17)
IMM GRANULOCYTES # BLD AUTO: 0.06 THOUSAND/UL (ref 0–0.2)
IMM GRANULOCYTES NFR BLD AUTO: 1 % (ref 0–2)
IRON SATN MFR SERPL: 26 % (ref 15–50)
IRON SERPL-MCNC: 49 UG/DL (ref 50–212)
LACTATE SERPL-SCNC: 1 MMOL/L (ref 0.5–2)
LYMPHOCYTES # BLD AUTO: 0.83 THOUSANDS/ÂΜL (ref 0.6–4.47)
LYMPHOCYTES NFR BLD AUTO: 9 % (ref 14–44)
MCH RBC QN AUTO: 31.2 PG (ref 26.8–34.3)
MCHC RBC AUTO-ENTMCNC: 31.2 G/DL (ref 31.4–37.4)
MCV RBC AUTO: 100 FL (ref 82–98)
MONOCYTES # BLD AUTO: 0.98 THOUSAND/ÂΜL (ref 0.17–1.22)
MONOCYTES NFR BLD AUTO: 11 % (ref 4–12)
NEUTROPHILS # BLD AUTO: 7.29 THOUSANDS/ÂΜL (ref 1.85–7.62)
NEUTS SEG NFR BLD AUTO: 77 % (ref 43–75)
NRBC BLD AUTO-RTO: 0 /100 WBCS
PHOSPHATE SERPL-MCNC: 4.8 MG/DL (ref 2.3–4.1)
PLATELET # BLD AUTO: 104 THOUSANDS/UL (ref 149–390)
PMV BLD AUTO: 11.1 FL (ref 8.9–12.7)
POTASSIUM SERPL-SCNC: 4.6 MMOL/L (ref 3.5–5.3)
RBC # BLD AUTO: 2.79 MILLION/UL (ref 3.88–5.62)
SODIUM SERPL-SCNC: 134 MMOL/L (ref 135–147)
TIBC SERPL-MCNC: 187.6 UG/DL (ref 250–450)
TRANSFERRIN SERPL-MCNC: 134 MG/DL (ref 203–362)
UIBC SERPL-MCNC: 139 UG/DL (ref 155–355)
WBC # BLD AUTO: 9.33 THOUSAND/UL (ref 4.31–10.16)

## 2025-05-19 PROCEDURE — 82728 ASSAY OF FERRITIN: CPT

## 2025-05-19 PROCEDURE — 99223 1ST HOSP IP/OBS HIGH 75: CPT | Performed by: STUDENT IN AN ORGANIZED HEALTH CARE EDUCATION/TRAINING PROGRAM

## 2025-05-19 PROCEDURE — 93005 ELECTROCARDIOGRAM TRACING: CPT

## 2025-05-19 PROCEDURE — 85730 THROMBOPLASTIN TIME PARTIAL: CPT | Performed by: NURSE PRACTITIONER

## 2025-05-19 PROCEDURE — 84100 ASSAY OF PHOSPHORUS: CPT | Performed by: NURSE PRACTITIONER

## 2025-05-19 PROCEDURE — 80048 BASIC METABOLIC PNL TOTAL CA: CPT | Performed by: NURSE PRACTITIONER

## 2025-05-19 PROCEDURE — 83605 ASSAY OF LACTIC ACID: CPT | Performed by: NURSE PRACTITIONER

## 2025-05-19 PROCEDURE — 99291 CRITICAL CARE FIRST HOUR: CPT

## 2025-05-19 PROCEDURE — 85025 COMPLETE CBC W/AUTO DIFF WBC: CPT | Performed by: NURSE PRACTITIONER

## 2025-05-19 PROCEDURE — 99232 SBSQ HOSP IP/OBS MODERATE 35: CPT | Performed by: INTERNAL MEDICINE

## 2025-05-19 PROCEDURE — 82948 REAGENT STRIP/BLOOD GLUCOSE: CPT

## 2025-05-19 PROCEDURE — 83540 ASSAY OF IRON: CPT

## 2025-05-19 PROCEDURE — 82330 ASSAY OF CALCIUM: CPT | Performed by: NURSE PRACTITIONER

## 2025-05-19 PROCEDURE — 90935 HEMODIALYSIS ONE EVALUATION: CPT | Performed by: INTERNAL MEDICINE

## 2025-05-19 PROCEDURE — 83550 IRON BINDING TEST: CPT

## 2025-05-19 RX ORDER — CALCIUM GLUCONATE 20 MG/ML
1 INJECTION, SOLUTION INTRAVENOUS ONCE
Status: COMPLETED | OUTPATIENT
Start: 2025-05-19 | End: 2025-05-19

## 2025-05-19 RX ORDER — DOXYCYCLINE 100 MG/1
100 CAPSULE ORAL EVERY 12 HOURS SCHEDULED
Status: DISCONTINUED | OUTPATIENT
Start: 2025-05-19 | End: 2025-05-23 | Stop reason: HOSPADM

## 2025-05-19 RX ORDER — ONDANSETRON 2 MG/ML
4 INJECTION INTRAMUSCULAR; INTRAVENOUS ONCE
Status: COMPLETED | OUTPATIENT
Start: 2025-05-19 | End: 2025-05-19

## 2025-05-19 RX ORDER — DIVALPROEX SODIUM 125 MG/1
250 CAPSULE, COATED PELLETS ORAL EVERY 12 HOURS SCHEDULED
Status: DISCONTINUED | OUTPATIENT
Start: 2025-05-19 | End: 2025-05-23

## 2025-05-19 RX ORDER — MIDODRINE HYDROCHLORIDE 5 MG/1
15 TABLET ORAL
Status: DISCONTINUED | OUTPATIENT
Start: 2025-05-19 | End: 2025-05-23 | Stop reason: HOSPADM

## 2025-05-19 RX ADMIN — CEFEPIME 1000 MG: 1 INJECTION, POWDER, FOR SOLUTION INTRAMUSCULAR; INTRAVENOUS at 05:15

## 2025-05-19 RX ADMIN — ATORVASTATIN CALCIUM 40 MG: 40 TABLET, FILM COATED ORAL at 17:31

## 2025-05-19 RX ADMIN — PANTOPRAZOLE SODIUM 40 MG: 40 INJECTION, POWDER, LYOPHILIZED, FOR SOLUTION INTRAVENOUS at 08:35

## 2025-05-19 RX ADMIN — DIVALPROEX SODIUM 250 MG: 125 CAPSULE, COATED PELLETS ORAL at 10:42

## 2025-05-19 RX ADMIN — PANTOPRAZOLE SODIUM 40 MG: 40 INJECTION, POWDER, LYOPHILIZED, FOR SOLUTION INTRAVENOUS at 21:44

## 2025-05-19 RX ADMIN — NOREPINEPHRINE BITARTRATE 2 MCG/MIN: 1 INJECTION INTRAVENOUS at 14:09

## 2025-05-19 RX ADMIN — DOXYCYCLINE 100 MG: 100 CAPSULE ORAL at 10:44

## 2025-05-19 RX ADMIN — CYANOCOBALAMIN TAB 500 MCG 250 MCG: 500 TAB at 08:34

## 2025-05-19 RX ADMIN — MIDODRINE HYDROCHLORIDE 15 MG: 5 TABLET ORAL at 17:31

## 2025-05-19 RX ADMIN — MIDODRINE HYDROCHLORIDE 10 MG: 5 TABLET ORAL at 07:03

## 2025-05-19 RX ADMIN — MIDODRINE HYDROCHLORIDE 15 MG: 5 TABLET ORAL at 10:42

## 2025-05-19 RX ADMIN — DOXYCYCLINE 100 MG: 100 CAPSULE ORAL at 21:44

## 2025-05-19 RX ADMIN — MIDODRINE HYDROCHLORIDE 10 MG: 5 TABLET ORAL at 08:34

## 2025-05-19 RX ADMIN — ONDANSETRON 4 MG: 2 INJECTION INTRAMUSCULAR; INTRAVENOUS at 22:06

## 2025-05-19 RX ADMIN — DIVALPROEX SODIUM 250 MG: 125 CAPSULE, COATED PELLETS ORAL at 21:44

## 2025-05-19 RX ADMIN — APIXABAN 2.5 MG: 2.5 TABLET, FILM COATED ORAL at 17:31

## 2025-05-19 RX ADMIN — ASPIRIN 81 MG: 81 TABLET, CHEWABLE ORAL at 08:34

## 2025-05-19 RX ADMIN — CALCIUM GLUCONATE 1 G: 20 INJECTION, SOLUTION INTRAVENOUS at 08:42

## 2025-05-19 RX ADMIN — NOREPINEPHRINE BITARTRATE 5 MCG/MIN: 1 INJECTION INTRAVENOUS at 18:37

## 2025-05-19 RX ADMIN — CEFEPIME 1000 MG: 1 INJECTION, POWDER, FOR SOLUTION INTRAMUSCULAR; INTRAVENOUS at 17:31

## 2025-05-19 RX ADMIN — CHLORHEXIDINE GLUCONATE 15 ML: 1.2 SOLUTION ORAL at 08:42

## 2025-05-19 RX ADMIN — CHLORHEXIDINE GLUCONATE 15 ML: 1.2 SOLUTION ORAL at 21:44

## 2025-05-19 RX ADMIN — PRASUGREL 10 MG: 10 TABLET, FILM COATED ORAL at 10:43

## 2025-05-19 NOTE — ASSESSMENT & PLAN NOTE
Noted to have troponin of 88 in the ED. cardiology consulted, suspect Non-MI troponin elevation.  EKG WNL    Presented to the ED via EMS after being found unresponsive by family at home. Unknown amount of downtime. Noted to be on HD 3x per week which recently switched to home HD with less removal of fluid. Usually has 4.5-5 L removed and now only about 1 L removed per session. Dry weight is 94 kg as per family and is 107 kg in ED. Elevated lactate.     -Intubated for airway protection  -Neuro checks Q1H  -Septic workup started, follow-up blood cultures  -Started on vanco/cefepime for empiric coverage

## 2025-05-19 NOTE — ASSESSMENT & PLAN NOTE
Thrombosis of left MCA in 2018  No residual symptoms, except cognitive impairment (per notes)  Poor historian  Continue aspirin

## 2025-05-19 NOTE — ASSESSMENT & PLAN NOTE
Suspected that his presenting episode of unresponsiveness and low BP due to low blood pressure post dialysis and fluid removal, in the setting of severe AS and cardiomyopathy, sepsis   On midodrine prior to HD  -Currently on HD, remains on norepi gtt - wean as tolerated  -Will readdress GDMT for cardiomyopathy once HD stable and off pressors for at least 24 hours

## 2025-05-19 NOTE — ASSESSMENT & PLAN NOTE
Lab Results   Component Value Date    EGFR 8 05/19/2025    EGFR 13 05/18/2025    EGFR 13 05/17/2025    CREATININE 6.11 (H) 05/19/2025    CREATININE 4.34 (H) 05/18/2025    CREATININE 4.31 (H) 05/17/2025   #ESRD on HD MWF:  Dialysis unit/days: [facility]   Access:AV fistula  EDW 95 kg, unable to achieve dry weight as per family because he drinks too much water.  Fluid restriction less than 1.5 L/day  Patient seen on hemodialysis today.  Currently on a low-dose of Levophed.  Blood pressure currently stable saturating 100% on room air aiming for 2 L ultrafiltration.  Discussed with the wife at bedside

## 2025-05-19 NOTE — PROGRESS NOTES
Progress Note - Critical Care/ICU   Name: Asad Galloway 65 y.o. male I MRN: [id number]  Unit/Bed#: ICU 03 I Date of Admission: 5/17/2025   Date of Service: 5/19/2025 I Hospital Day: 2      Assessment & Plan  Shock (HCC)  Hypotension  Patient requiring norepinephrine to maintain maps above 65.  Borderline BP.  Low blood blood pressure postdialysis and fluid removal that day in the setting of severe AS and cardiomyopathy.  Also consideration of sepsis/infection, suspected pneumonia on CT.  Sepsis versus some component of cardiogenic shock    Plan:  Wean levo as tolerated to maintain MAP greater than 65  Treatment for pneumonia see plan under sepsis.  Holding home metoprolol and Entresto in setting of hypotension  Continue midodrine 3 times daily  ESRD on dialysis ([facility])  Anemia due to chronic kidney disease, on chronic dialysis ([facility])  Anemia  Lab Results   Component Value Date    EGFR 8 05/19/2025    EGFR 13 05/18/2025    EGFR 13 05/17/2025    CREATININE 6.11 (H) 05/19/2025    CREATININE 4.34 (H) 05/18/2025    CREATININE 4.31 (H) 05/17/2025     Hx of HD 3x per week.  Access, left AV fistula.  Nephro following    -Trend renal functioning  -Plan for HD if BP stabilizes  Renal Diet  Fluid restriction 1-1.5L/d  Adjust medications to GFR<10  Avoid opioids   Patent is chronically hypotensive , currently on NE. Will attempt iHD . If he doesn't tolerate then we will need to do CRRT  Cirrhotic liver morphology on CT  Monitor CBC  Epogen contraindicated in setting of stroke  Pneumonia  Sepsis (HCC)  CT remarkable for interstitial pneumonia 5/17    Fever, elevated heart rate, tachypnea.  CT concerning for interstitial pneumonia.   Recent Labs     05/17/25  0334 05/17/25  0431   LACTICACID 3.1* 2.8*     On cefepime 1 g every 24 hours  Chronic systolic heart failure (HCC)  Ischemic cardiomyopathy  Wt Readings from Last 3 Encounters:   05/19/25 95.3 kg (210 lb 1.6 oz)   05/02/25 94.3 kg (208 lb)   05/01/25 94.8 kg (209 lb)      Metoprolol, entresto on hold due to low blood pressure    CHF with acute exacerbation, LVEF 30 to 35%.  Acute volume overload due to poor HD sessions.  Up 10K from dry weight.  Aortic stenosis, moderate to severe, ongoing TAVR workup [place]   Echo 5/18: LVEF 25%. Systolic function is severely reduced.  Moderate/severe AAS, mild/moderate TR    Plan:  Resume GDMT when blood pressures allow  Cardiology following  Cirrhosis (HCC)  Cirrhotic liver morphology on CT  CAD, multiple vessel  3/17/25 ([place]) - [facility] 90% pLAD stenosis, severe plaque burden, in-stent restenosis with areas of underexpansion and calcified nodules in vision between overlap of stents --> POBA with drug-coated balloon for in-stent restenosis, no new stent  Status post PCI to LAD  Continue aspirin, prasugrel with recent PCI  Note: He is noted to be Plavix nonresponder at OSH with history of in-stent restenosis  Chronic deep vein thrombosis (DVT) of distal vein of right lower extremity ([facility])  Prescribed Eliquis, heparin drip while inpatient  History of stroke in adulthood  Thrombosis of left MCA in 2018  No residual symptoms, except cognitive impairment (per notes)  Poor historian  Continue aspirin  Absence of toe of right foot ([facility])  Chronic wounds, care consulted  Mood disorder ([facility])  Unspecified mood disorder, patient previously on Depakote.  Was discontinued during his previous admission in [facility] due to concerns for thrombocytopenia.  Goals of care, counseling/discussion  Patient and family amenable to goals of care discussion, palliative care consulted  Elevated troponin (Resolved: 5/19/2025)  Noted to have troponin of 88 in the ED. cardiology consulted, suspect Non-MI troponin elevation.  EKG WNL  Hypothermia (Resolved: 5/19/2025)  Hypothermic after rectal temperature in ED.      -Bob hugger as needed.  Altered mental status (Resolved: 5/19/2025)  Noted to have troponin of 88 in the ED. cardiology  consulted, suspect Non-MI troponin elevation.  EKG WNL    Presented to the ED via EMS after being found unresponsive by family at home. Unknown amount of downtime. Noted to be on HD 3x per week which recently switched to home HD with less removal of fluid. Usually has 4.5-5 L removed and now only about 1 L removed per session. Dry weight is 94 kg as per family and is 107 kg in ED. Elevated lactate.     -Intubated for airway protection  -Neuro checks Q1H  -Septic workup started, follow-up blood cultures  -Started on vanco/cefepime for empiric coverage   Acute hypoxic respiratory failure (HCC) (Resolved: 5/19/2025)  Hypothermic after rectal temperature in ED.  Bob hugger as needed.    Unresponsive on arrival to the ED. Intubated for airway protection and for hypoxia.  Patient extubated 5/18.  Disposition: Critical care    ICU Core Measures     A: Assess, Prevent, and Manage Pain Has pain been assessed? Yes  Need for changes to pain regimen? No   B: Both SAT/SAT  N/A   C: Choice of Sedation RASS Goal: 0 Alert and Calm  Need for changes to sedation or analgesia regimen? No   D: Delirium CAM-ICU: Negative   E: Early Mobility  Plan for early mobility? Yes   F: Family Engagement Plan for family engagement today? Yes       Antibiotic Review: Awaiting culture results.  and Continue broad spectrum secondary to severity of illness.       Prophylaxis:  VTE VTE covered by:  heparin (porcine), Intravenous, 11.1 Units/kg/hr at 05/18/25 2350  heparin (porcine), Intravenous, 2,000 Units at 05/18/25 1609  heparin (porcine), Intravenous, 4,000 Units at 05/18/25 0905       Stress Ulcer  covered bypantoprazole (PROTONIX) injection 40 mg [id number]         24 Hour Events : 65M hx CHF, ESRD, DM2, CAD, cirrhosis presenting 5/17 with AMS and found down requiring intubation. Brought from home for sudden onset unresponsiveness at 1am yesterday, hypothermic, mental status did not improve with narcan so he was intubated. Likely volume  "overloaded in the setting of inadequate dialysis/CHF exacerbation. had been getting on 4L total off all week instead of usually 12-15L  Dialysis 5/17, 5/18.  Extubated 5/18.   Patient is currently on antibiotics for possible pneumonia. +coughing  Mental status back to baseline, patient still on levo 3 mcg.    His right antecubital IV was removed with continuous oozing of blood, and associated bruising.  Surgicel and pressure bandage was applied, was removed this a.m. with recurrence of oozing blood.  Again Surgicel and pressure bandage was applied with adequate tamponade.    Subjective   Patient repeatedly stating \"I want to die\".  Wife was at bedside.  They are amenable to palliative care.    Objective :                   Vitals I/O      Most Recent Min/Max in 24hrs   Temp 98.7 °F (37.1 °C) Temp  Min: 97.5 °F (36.4 °C)  Max: 100.4 °F (38 °C)   Pulse 79 Pulse  Min: 68  Max: 95   Resp (!) 28 Resp  Min: 11  Max: 29   BP (!) 94/49 BP  Min: 81/35  Max: 120/56   O2 Sat 100 % SpO2  Min: 87 %  Max: 100 %      Intake/Output Summary (Last 24 hours) at 5/19/2025 0835  Last data filed at 5/19/2025 0810  Gross per 24 hour   Intake 1365.83 ml   Output 2300 ml   Net -934.17 ml       Diet Renal; Potassium 2 GM; No; No; Lo Phosphorus    Invasive Monitoring   Arterial Line  Dorota BP    No data recorded   MAP    No data recorded           Physical Exam   Physical Exam  Skin:     Coloration: Skin is pale.      Findings: Lesion and wound present.      Comments: Left AV fistula, right pressure bandage present, adequate tamponade of bleeding.   HENT:      Nose: No congestion or rhinorrhea.   Cardiovascular:      Rate and Rhythm: Normal rate and regular rhythm.   Musculoskeletal:      Right lower leg: No edema.      Left lower leg: No edema.   Constitutional:       General: He is not in acute distress.  Pulmonary:      Effort: No respiratory distress.      Breath sounds: No wheezing or rhonchi.   Neurological:      General: No focal " deficit present.      Mental Status: He is alert. Mental status is at baseline.          Diagnostic Studies        Lab Results: I have reviewed the following results:     Medications:  Scheduled PRN   aspirin, 81 mg, Daily  atorvastatin, 40 mg, Daily With Dinner  calcium gluconate, 1 g, Once  cefepime, 1,000 mg, Q24H  chlorhexidine, 15 mL, Q12H [facility]  cyanocobalamin, 250 mcg, Daily  [Held by provider] metoprolol succinate, 25 mg, BID  midodrine, 10 mg, TID AC  pantoprazole, 40 mg, Q12H [facility]  prasugrel, 10 mg, Daily      acetaminophen, 650 mg, Q6H PRN  heparin (porcine), 2,000 Units, Q6H PRN  heparin (porcine), 4,000 Units, Q6H PRN  midodrine, 10 mg, Before Dialysis       Continuous    heparin (porcine), 3-20 Units/kg/hr (Order-Specific), Last Rate: 11.1 Units/kg/hr (05/18/25 2350)  norepinephrine, 1-30 mcg/min, Last Rate: 3 mcg/min (05/18/25 2018)         Labs:   CBC    Recent Labs     05/18/25 0432 05/18/25  1139 05/19/25  0321   WBC 6.72  --  9.33   HGB 8.5* 9.5* 8.7*   HCT 27.3* 29.6* 27.9*   PLT 77*  --  104*     BMP    Recent Labs     05/18/25 0432 05/19/25  0321   SODIUM 136 134*   K 4.6 4.6   CL 94* 93*   CO2 31 29   AGAP 11 12   BUN 24 36*   CREATININE 4.34* 6.11*   CALCIUM 7.9* 8.1*       Coags    Recent Labs     05/17/25  1029 05/17/25  1850 05/18/25  2135 05/19/25  0542   INR 1.58*  --   --   --    *   < > >210* 149*    < > = values in this interval not displayed.        Additional Electrolytes  Recent Labs     05/18/25 0432 05/18/25  1139 05/19/25  0321   MG 2.1  --   --    PHOS 3.8  --  4.8*   CAIONIZED 0.89* 1.00* 0.96*          Blood Gas    No recent results  No recent results LFTs  No recent results    Infectious  No recent results  Glucose  Recent Labs     05/18/25  0432 05/19/25  0321   GLUC 64* 97      Neuro:  Awake and close to baseline a few hours later in the ICU. Potentially toxic metabolic encephalopathy from incomplete HD sessions (though normal uremia) vs hypotension  No cardiac  event on life vest data   No known seizures.    CV: CHF with acute exacerbation   Shock - likely septic, possible cardiogenic component. Baseline EF 20%  Acute volume overload due to poor HD sessions  Up 10K from dry weight  No events on life vest interrogation     - Continue volume removal with HD  - baseline MAPS 60s  - Midodrine 3 times daily  - levo for MAP goal 65  - abx as below     Resp: Possible PNA  - Continue cefepime, day 3, anticipate 5 days  - fu blood cultures, follow-up sputum cultures if able to obtain     GI: Renal diet, low potassium low phos, Protonix 40 mg twice daily for GI prophylaxis     Renal: ESRD on HD - had been getting on 4L total off all week instead of usually 12-15L   Note has been admitted every month the last few months  Sp 2L removed on 5/17, 2 L removed 5/18     Heme: chronic anemia - stable, most likely ISO chronic disease/cirrhosis/ESRD  DVT (recent) - hold eliquis, start heparin drip, on aspirin and Effient (prasugrel)  Monitor IV site for recurrent bleeding.  B12 supplementation daily     ID:  septic shock PNA  Continue cefepime     Endo: ISS     MSK: chronic wounds - wound care, right IV antecubital was removed, IV and right hand.     Lines: Right IV, AV fistula on L      Dispo: ICU, palliative care consult

## 2025-05-19 NOTE — PROGRESS NOTES
Progress Note - Cardiology   Name: Asad Galloway 65 y.o. male I MRN: [id number]  Unit/Bed#: ICU 03 I Date of Admission: 5/17/2025   Date of Service: 5/19/2025 I Hospital Day: 2     Assessment & Plan  ESRD on dialysis (HCC)  Lab Results   Component Value Date    EGFR 8 05/19/2025    EGFR 13 05/18/2025    EGFR 13 05/17/2025    CREATININE 6.11 (H) 05/19/2025    CREATININE 4.34 (H) 05/18/2025    CREATININE 4.31 (H) 05/17/2025     Hypotension  Suspected that his presenting episode of unresponsiveness and low BP due to low blood pressure post dialysis and fluid removal, in the setting of severe AS and cardiomyopathy, sepsis   On midodrine prior to HD  -Currently on HD, remains on norepi gtt - wean as tolerated  -Will readdress GDMT for cardiomyopathy once HD stable and off pressors for at least 24 hours  CAD, multiple vessel  Recent PCI in March 2025  -Continue aspirin, prasugrel  Ischemic cardiomyopathy  LVEF 25-30%  LifeVest without arrhythmia or therapies delivered  -Metoprolol and Entresto held due to above    Elevated troponin (Resolved: 5/19/2025)  Non MI troponin elevation  Nonrheumatic aortic valve stenosis  Moderate to severe AS  Ongoing T[facility]R work up at [facility]    Subjective   Interval history: Patient seen & examined. Currently on HD.     Objective :  Temp:  [97.5 °F (36.4 °C)-100.2 °F (37.9 °C)] 98 °F (36.7 °C)  HR:  [68-95] 86  BP: ()/(35-62) 122/56  Resp:  [11-29] 26  SpO2:  [87 %-100 %] 100 %  O2 Device: Nasal cannula  Orthostatic Blood Pressures      Flowsheet Row Most Recent Value   Blood Pressure 122/56 filed at 05/19/2025 0930   Patient Position - Orthostatic VS Lying filed at 05/19/2025 0748          First Weight: Weight - Scale: 107 kg (235 lb 7.2 oz) (05/17/25 0302)  Vitals:    05/18/25 0909 05/19/25 0600   Weight: 95.7 kg (211 lb) 95.3 kg (210 lb 1.6 oz)     Physical Exam  Constitutional:       General: He is not in acute distress.     Appearance: He is not toxic-appearing.      Cardiovascular:      Rate and Rhythm: Normal rate and regular rhythm.      Heart sounds: Murmur heard.   Pulmonary:      Effort: Pulmonary effort is normal. No respiratory distress.   Abdominal:      General: Bowel sounds are normal. There is no distension.      Palpations: Abdomen is soft.      Tenderness: There is no abdominal tenderness.     Musculoskeletal:      Right lower leg: No edema.      Left lower leg: No edema.     Skin:     General: Skin is warm and dry.     Neurological:      Mental Status: He is alert.           Lab Results: I have reviewed the following results:CBC/BMP:   .     05/19/25  0321   WBC 9.33   HGB 8.7*   HCT 27.9*   *   SODIUM 134*   K 4.6   CL 93*   CO2 29   BUN 36*   CREATININE 6.11*   GLUC 97   CAIONIZED 0.96*   PHOS 4.8*      Results from last 7 days   Lab Units 05/19/25  0321 05/18/25  1139 05/18/25  0432 05/17/25  1700 05/17/25  0502   WBC Thousand/uL 9.33  --  6.72  --  6.11   HEMOGLOBIN g/dL 8.7* 9.5* 8.5*   < > 9.8*   HEMATOCRIT % 27.9* 29.6* 27.3*   < > 31.0*   PLATELETS Thousands/uL 104*  --  77*  --  95*    < > = values in this interval not displayed.     Results from last 7 days   Lab Units 05/19/25  0321 05/18/25  0432 05/17/25  0828 05/17/25  0334 05/17/25  0305   POTASSIUM mmol/L 4.6 4.6 3.8   < >  --    CHLORIDE mmol/L 93* 94* 100   < >  --    CO2 mmol/L 29 31 27   < >  --    CO2, I-STAT mmol/L  --   --   --   --  27   BUN mg/dL 36* 24 30*   < >  --    CREATININE mg/dL 6.11* 4.34* 4.31*   < >  --    GLUCOSE, ISTAT mg/dl  --   --   --   --  152*   CALCIUM mg/dL 8.1* 7.9* 6.5*   < >  --     < > = values in this interval not displayed.     Results from last 7 days   Lab Units 05/19/25  0542 05/18/25  2135 05/18/25  1523 05/17/25  1850 05/17/25  1029 05/17/25  0334   INR   --   --   --   --  1.58* 1.47*   PTT seconds 149* >210* 53*   < > 116* 34    < > = values in this interval not displayed.     Lab Results   Component Value Date    HGBA1C 5.4 04/10/2025     Lab  Results   Component Value Date    CKTOTAL 188 11/12/2022    CKMB 4.0 11/12/2022    CKMBINDEX 2.1 11/12/2022    TROPONINI <0.02 09/08/2021

## 2025-05-19 NOTE — ASSESSMENT & PLAN NOTE
Noncompliant with fluid restriction as an outpatient.  Patient has underlying cardiomyopathy and severe aortic stenosis.  Blood pressure currently stable slightly above dry weight will aim for 2 L ultrafiltration as blood pressure tolerates  Tolerated dialysis well yesterday  Currently on a low-dose of Levophed

## 2025-05-19 NOTE — CONSULTS
"Inpatient Consultation - Palliative and Supportive Care   Asad Galloway 65 y.o. male [id number]    Palliative care by specialist  Assessment & Plan  Palliative diagnosis: ESRD, chronic combined CHF  PPS: 50-60%    Education/counseling provided on role/purpose of palliative care; benefits/risks of treatment options by our team reviewed; instructions for management and anticipatory guidance provided; supportive listening and presence provided; provided space for life review and whole person assessment    Psychosocial/Spiritual:   -Supported by spouse Eliana, and 4 adult children (Miguel A, Karen, Sam, Adamaris)  - Retired now, but former  40 years, spent a lot of time commuting to [place]  - Enjoys TV (3 stooges, family feud), spending time with family/grandchildren, car rides, [employer] music, poker, and food (meat liver)  - Strong [employer] dom[employer], very close to  and community ( [facility])    Communicated and coordinated with CCM    Follow-up planning: Would benefit from outpatient follow-up in our office.  Will coordinate.    Goals of care, counseling/discussion  Assessment & Plan  Continue disease directed cares without limits    Despite understanding that with his chronic conditions he is likely to be re-hospitalized, he wishes to continue for the possibility of more time    His saying \"I want to die,\" is not a reflection of an actual desire nor an inclination towards comfort care/hospice.  Due to his stroke and ongoing grief due to serious illness (severely limited physically after being a very active person), there is both mood dysregulation and severe anxiety related to the hospital environment.  He explains that this phrase has underlying meaning of him feeling guilt related to his illness, as well as a desire to be home and away from the hospital.  Although family gives him strength, he has a strong fear of death.                Code Status: Full - Level 1               Decisional " "apparatus:  Patient is marginally competent on my exam today.  If competence is lost, patient's substitute decision maker would default to spouse by [place] Act 169.               Advance Directive / Living Will / POLST:  None on file    Mood disorder (HCC)  Assessment & Plan  From CVA.  An appropriate affect and emotional dysregulation related to hospital environment and certain topics such as \"serious disease,\" cancer,\" death/dying\", etc. follows with psychiatry in and out of network institution, who planned for antipsychotic therapy with Depakote and Seroquel.  However, they are holding off on these medications until aortic valve and heart disease can be addressed    Nonrheumatic aortic valve stenosis  Assessment & Plan  Echocardiogram 5/18/2025    Aortic Valve: The leaflets are moderately thickened. The leaflets are severely calcified. There is severely reduced mobility. There is mild to moderate regurgitation. There is at least moderate stenosis. The aortic valve mean gradient is 24 mmHg. The dimensionless velocity index is 0.25. The aortic valve area is 1.44 cm2.     They are working with out of network cardiology and CT surgery for repair    Cirrhosis ([facility])  Assessment & Plan  Compensated but imaging shows morphology    Anemia due to chronic kidney disease, on chronic dialysis ([facility])  Assessment & Plan  Lab Results   Component Value Date    EGFR 8 05/19/2025    EGFR 13 05/18/2025    EGFR 13 05/17/2025    CREATININE 6.11 (H) 05/19/2025    CREATININE 4.34 (H) 05/18/2025    CREATININE 4.31 (H) 05/17/2025       Chronic systolic heart failure (HCC)  Assessment & Plan  Wt Readings from Last 3 Encounters:   05/19/25 93.2 kg (205 lb 7.5 oz)   05/02/25 94.3 kg (208 lb)   05/01/25 94.8 kg (209 lb)         Echocardiogram 5/18/2025:    Left Ventricle: Left ventricular cavity size is mildly dilated. LVIDd is 5.90 cm. Wall thickness is mildly increased. There is mild to moderate concentric hypertrophy. The left ventricular " ejection fraction is 25%. Systolic function is severely reduced. Wall motion is normal.    Right Ventricle: Right ventricular cavity size is moderately dilated.    Left Atrium: The atrium is severely dilated (42-48 mL/m2).    Right Atrium: The atrium is moderately dilated.      CAD, multiple vessel  Assessment & Plan  S/p stents and on antiplatelet/anticoagulation therapy    ESRD on dialysis (HCC)  Assessment & Plan  Lab Results   Component Value Date    EGFR 8 05/19/2025    EGFR 13 05/18/2025    EGFR 13 05/17/2025    CREATININE 6.11 (H) 05/19/2025    CREATININE 4.34 (H) 05/18/2025    CREATININE 4.31 (H) 05/17/2025     They are working with out of network institution trying to get on transplant list    Anemia  Assessment & Plan  Anemia of ESRD    History of stroke in adulthood  Assessment & Plan  With mood dysregulation and limitations    * Shock (HCC)  Assessment & Plan  Per critical care team         I have reviewed the patient's controlled substance dispensing history in the Prescription Drug Monitoring Program in compliance with the [facility] regulations before prescribing any controlled substances.         We appreciate the invitation to be involved in this patient's care.  We will continue to follow.  Please do not hesitate to reach our on call provider through our clinic answering service at [phone] should you have acute symptom control concerns.    Harish Arellano DO  Palliative and Supportive Care  Clinic/Answering Service: [phone]  You can find me on [employer] Secure Chat!       IDENTIFICATION:  Inpatient consult to Palliative Care  Consult performed by: Harish Arellano DO  Consult ordered by: Belén Calle MD        Physician Requesting Consult: Tere Bingham MD  Reason for Consult / Principal Problem: Goals of care, support  Hx and PE limited by: Cognition    NARRATIVE AND INTERVAL HISTORY:       Asad Galloway is a 65 y.o. male with history of ESRD on HD, multivessel CAD with ischemic CM, chronic combined  "systolic and diastolic CHF with LifeVest, PAD, history of CVA, chronic DVT, and cirrhosis who presented to the ED by EMS 5/17/2025 after being found unresponsive by family.  Unknown downtime, with EMS finding his BP 70.  Upon presentation to the ED, vital signs significant for temperature 90.7 °F, SPO2 71%.  Lab work significant for TSH 12.39, troponin 88, BNP 3179, PT 18.5/INR 1.47, sodium 134, chloride 92, anion gap 14, BUN 32, creatinine 5.02, glucose 147, calcium 7.8, AST 11, alkaline phosphatase 131, lactic acid 3.1, hemoglobin 9.7, platelets 91.  CT head revealed no acute findings.  CT spine cervical revealed no acute findings just multilevel degenerative changes C3/C4.  CT chest abdomen pelvis revealed scattered mild interlobular septal thickening and areas of groundglass opacities seen bilaterally, nonspecific mildly prominent mediastinal hilar lymph nodes, subcutaneous air in bilateral subclavian regions, cirrhotic morphology, splenomegaly, symmetrical renal atrophy with cortical thinning, extensive calcific atherosclerosis.  Chest x-ray revealed mild pulmonary edema.  ECG revealed sinus bradycardia with left axis deviation and right bundle branch block.  In the ED, he was given Narcan and his MRI on an admit date and propofol.  Was intubated for acute respiratory failure + vasopressors for hypotension and sent to the ICU for supportive care.  Presumptive diagnosis is cardiogenic shock.  Palliative and supportive care consulted for goals of care.  Patient stating he \"I want to die.\"    Past Medical History:   Diagnosis Date    Cerebrovascular accident (CVA) due to thrombosis of left middle cerebral artery (HCC) 07/29/2018    Chronic kidney disease     Coronary artery disease     Diabetes mellitus (HCC)     not on meds    Dialysis patient (HCC)     M-W-F    Fistula     left upper arm for hemodialyis    GERD (gastroesophageal reflux disease)     History of coronary artery stent placement     x3    " Hypercholesteremia     Hyperlipidemia     Hypertension     Infectious viral hepatitis     B as child    Limb alert care status     no BP/IV left arm    Neuropathy     Nonhealing ulcer of heel (HCC) 04/25/2023    Obesity     Osteomyelitis (HCC)     last assessed 11/4/16-per son not currently    Penetrating foot wound, left, initial encounter 01/19/2022    PVC's (premature ventricular contractions)     sees [facility]    Stroke (HCC)     last weeof July 2018 [facility]    TIA (transient ischemic attack) 10/28/2018    Ulcer of left heel, limited to breakdown of skin (HCC) 06/13/2024    Wears dentures     Wears glasses      Past Surgical History:   Procedure Laterality Date    ABDOMINAL SURGERY      CARDIAC CATHETERIZATION N/A 05/02/2022    Procedure: Cardiac Coronary Angiogram;  Surgeon: Sam Davis MD;  [facility]: [facility];  Service: Cardiology    CARDIAC CATHETERIZATION N/A 05/02/2022    Procedure: Cardiac pci;  Surgeon: Sam Davis MD;  [facility]: [facility];  Service: Cardiology    CARDIAC CATHETERIZATION  02/01/2023    Procedure: Cardiac catheterization;  Surgeon: Sam Davis MD;  [facility]: [facility]ARD[facility];  Service: Cardiology    CARDIAC CATHETERIZATION N/A 02/01/2023    Procedure: Cardiac pci;  Surgeon: Sam Davis MD;  [facility]: [facility];  Service: Cardiology    CARDIAC CATHETERIZATION N/A 02/01/2023    Procedure: Cardiac Coronary Angiogram;  Surgeon: Sam Davis MD;  [facility];  Service: Cardiology    CARDIAC CATHETERIZATION N/A 02/01/2023    Procedure: Cardiac other-IVUS;  Surgeon: Sam Davis MD;  [facility]: [facility];  Service: Cardiology    CHOLECYSTECTOMY      Percutaneous    COLONOSCOPY      CORONARY ANGIOPLASTY WITH STENT PLACEMENT      CYSTOSCOPY      HEMODIALYSIS ADULT  1/22/2024    HEMODIALYSIS ADULT  01/24/2024    HEMODIALYSIS ADULT  5/19/2025    IR LOWER EXTREMITY ANGIOGRAM  9/29/2023    IR LOWER EXTREMITY ANGIOGRAM   "02/26/2024    OTHER SURGICAL HISTORY      \"stimulator to control bowel movements\"    GA ESOPHAGOGASTRODUODENOSCOPY TRANSORAL DIAGNOSTIC N/A 09/27/2016    Procedure: ESOPHAGOGASTRODUODENOSCOPY (EGD);  Surgeon: Adele Rowe MD;  Location: [facility] GI [facility];  Service: Gastroenterology    GA LAPAROSCOPY SURG CHOLECYSTECTOMY N/A 02/29/2016    Procedure: LAPAROSCOPIC CHOLECYSTECTOMY ;  Surgeon: Cliff Roman DO;  Location: [facility];  Service: General    GA SLCTV CATHJ 3RD+ ORD SLCTV ABDL PEL/LXTR BRNCH Left 9/29/2023    Procedure: Left leg angiogram with intervention;  Surgeon: Michelle Galvan MD;  Location: [facility];  Service: Vascular    GA SLCTV CATHJ 3RD+ ORD SLCTV ABDL PEL/LXTR BRNCH Left 02/26/2024    Procedure: Left leg angiogram antegrade access, left popliteal angioplasty;  Surgeon: Michelle Galvan MD;  Location: [facility];  Service: Vascular    ROTATOR CUFF REPAIR Right     SPINAL CORD STIMULATOR IMPLANT      \"Medtronic interstim model # 3058- in lower back to control bowel movements-currently turned off-battery is dead\"    TOE AMPUTATION Right 10/28/2016    Procedure: 3RD TOE AMPUTATION ;  Surgeon: Anjel Salas DPM;  Location: [facility];  Service:      Social History     Socioeconomic History    Marital status: /Civil Union     Spouse name: Not on file    Number of children: Not on file    Years of education: Not on file    Highest education level: Not asked   Occupational History    Occupation: disabled   Tobacco Use    Smoking status: Never    Smokeless tobacco: Never   Vaping Use    Vaping status: Never Used   Substance and Sexual Activity    Alcohol use: Never    Drug use: No    Sexual activity: Not Currently     Partners: Female     Comment: defer   Other Topics Concern    Not on file   Social History Narrative    Daily caffeine consumption 2-3 servings a day     Social Drivers of Health     Financial Resource Strain: Patient Declined (7/13/2023)    Overall Financial Resource " Strain (CARDIA)     Difficulty of Paying Living Expenses: Patient declined   Food Insecurity: No Food Insecurity (2025)    Nursing - Inadequate Food Risk Classification     Worried About Running Out of Food in the Last Year: Never true     Ran Out of Food in the Last Year: Never true     Ran Out of Food in the Last Year: Never true   Transportation Needs: No Transportation Needs (2025)    OASIS : Transportation     Lack of Transportation (Medical): No     Lack of Transportation (Non-Medical): No     Patient Unable or Declines to Respond: No   Physical Activity: Not on file   Stress: No Stress Concern Present (2019)    [place] [employer] - Occupational Stress Questionnaire     Feeling of Stress : Only a little   Social Connections: Unknown (2019)    Social Connection and Isolation Panel     Frequency of Communication with Friends and Family: Not on file     Frequency of Social Gatherings with Friends and Family: Not on file     Attends Adventism Services: Not on file     Active Member of Clubs or Organizations: Not on file     Attends Club or Organization Meetings: Not on file     Marital Status:    Intimate Partner Violence: Patient Unable To Answer (2025)    Nursing IPS     Feels Physically and Emotionally Safe: Not on file     Physically Hurt by Someone: Not on file     Humiliated or Emotionally Abused by Someone: Not on file     Physically Hurt by Someone: Patient unable to answer     Hurt or Threatened by Someone: Patient unable to answer   Housing Stability: Unknown (2025)    Nursing: Inadequate Housing Risk Classification     Has Housing: Not on file     Worried About Losing Housing: Not on file     Unable to Get Utilities: Not on file     Unable to Pay for Housing in the Last Year: No     Has Housin     Family History   Problem Relation Age of Onset    Leukemia Mother     Liver disease Mother     Lung cancer Mother         heavy smoker - 3  ppd    Heart disease Father     Liver disease Father     Diabetes type I Father     Multiple myeloma Sister     Breast cancer Sister     Urolithiasis Family     Alcohol abuse Neg Hx     Depression Neg Hx     Drug abuse Neg Hx     Substance Abuse Neg Hx     Mental illness Neg Hx        MEDICATIONS / ALLERGIES:    all current active meds have been reviewed, current meds:   Current Facility-Administered Medications:     acetaminophen (TYLENOL) oral suspension 650 mg, Q6H PRN    apixaban (ELIQUIS) tablet 2.5 mg, BID    aspirin chewable tablet 81 mg, Daily    atorvastatin (LIPITOR) tablet 40 mg, Daily With Dinner    cefepime (MAXIPIME) 1,000 mg in dextrose 5 % 50 mL IVPB, Q24H, Last Rate: 1,000 mg (05/19/25 1731)    chlorhexidine (PERIDEX) 0.12 % oral rinse 15 mL, Q12H M[facility]    cyanocobalamin (VITAMIN B-12) tablet 250 mcg, Daily    divalproex sodium (DEPAKOTE SPRINKLE) capsule 250 mg, Q12H MICH    doxycycline hyclate (VIBRAMYCIN) capsule 100 mg, Q12H M[facility]    [Held by provider] metoprolol succinate (TOPROL-XL) 24 hr tablet 25 mg, BID    midodrine (PROAMATINE) tablet 10 mg, Before Dialysis    midodrine (PROAMATINE) tablet 15 mg, TID AC    NOREPINEPHRINE 4 MG  ML NSS (CMPD ORDER) infusion, Titrated, Last Rate: 5 mcg/min (05/19/25 1837)    pantoprazole (PROTONIX) injection 40 mg, Q12H MICH    prasugrel (EFFIENT) tablet 10 mg, Daily, and PTA meds:   Prior to Admission Medications   Prescriptions Last Dose Informant Patient Reported? Taking?   Sevelamer Carbonate 2.4 g  Family Member Yes No   Sig: Take 1 packet by mouth Three times a day   Vitamin D3 1.25 MG (32632 UT) capsule  Family Member No No   Sig: TAKE 1 CAPSULE BY MOUTH ONE TIME PER WEEK   apixaban (Eliquis) 2.5 mg  Family Member No No   Sig: Take 1 tablet (2.5 mg total) by mouth 2 (two) times a day   aspirin 81 mg chewable tablet  Family Member Yes No   Sig: Chew 1 tablet daily   atorvastatin (LIPITOR) 40 mg tablet  Family Member No No   Sig: Take 1 tablet (40 mg  total) by mouth daily with dinner   metoprolol succinate (TOPROL-XL) 25 mg 24 hr tablet  Family Member No No   Sig: TAKE 1 TABLET BY MOUTH TWICE A DAY   midodrine (PROAMATINE) 10 MG tablet  Family Member No No   Sig: Take 1 tablet (10 mg total) by mouth Before Dialysis (before dialysis)   prasugrel (EFFIENT) tablet  Family Member No No   Sig: Take 1 tablet (10 mg total) by mouth daily   sacubitril-valsartan (ENTRESTO) 24-26 MG TABS  Family Member Yes No   Sig: Take 1 tablet by mouth daily after dinner      Facility-Administered Medications: None       Allergies[1]    OBJECTIVE:    Physical Exam  Physical Exam  Constitutional:       General: He is not in acute distress.     Appearance: He is ill-appearing.      Interventions: Nasal cannula in place.   HENT:      Head: Normocephalic and atraumatic.      Right Ear: External ear normal.      Left Ear: External ear normal.      Nose: Nose normal.      Mouth/Throat:      Mouth: Mucous membranes are moist.      Pharynx: Oropharynx is clear.     Eyes:      General: No scleral icterus.     Conjunctiva/sclera: Conjunctivae normal.       Cardiovascular:      Rate and Rhythm: Normal rate and regular rhythm.      Pulses: Decreased pulses.   Pulmonary:      Effort: Pulmonary effort is normal. No respiratory distress.   Abdominal:      General: There is no distension.      Palpations: Abdomen is soft.      Tenderness: There is no abdominal tenderness.     Musculoskeletal:      Cervical back: No rigidity or tenderness.      Right lower leg: No edema.      Left lower leg: No edema.   Feet:      Right foot:      Skin integrity: Ulcer, skin breakdown, callus, dry skin and fissure present.      Left foot:      Skin integrity: Skin breakdown, callus, dry skin and fissure present.   Lymphadenopathy:      Cervical: No cervical adenopathy.     Skin:     General: Skin is dry.      Coloration: Skin is pale. Skin is not jaundiced.     Neurological:      General: No focal deficit present.       Mental Status: He is alert. Mental status is at baseline.     Psychiatric:         Attention and Perception: Attention normal.         Mood and Affect: Affect is labile.         Behavior: Behavior normal.         Thought Content: Thought content normal.         Cognition and Memory: Cognition is impaired. Memory is not impaired.         Judgment: Judgment normal.         Lab Results: I have personally reviewed pertinent labs., CBC:   Lab Results   Component Value Date    WBC 9.33 05/19/2025    HGB 8.7 (L) 05/19/2025    HCT 27.9 (L) 05/19/2025     (H) 05/19/2025     (L) 05/19/2025    RBC 2.79 (L) 05/19/2025    MCH 31.2 05/19/2025    MCHC 31.2 (L) 05/19/2025    RDW 16.9 (H) 05/19/2025    MPV 11.1 05/19/2025    NRBC 0 05/19/2025   , CMP:   Lab Results   Component Value Date    SODIUM 134 (L) 05/19/2025    K 4.6 05/19/2025    CL 93 (L) 05/19/2025    CO2 29 05/19/2025    BUN 36 (H) 05/19/2025    CREATININE 6.11 (H) 05/19/2025    CALCIUM 8.1 (L) 05/19/2025    EGFR 8 05/19/2025   , BMP:  Lab Results   Component Value Date    SODIUM 134 (L) 05/19/2025    K 4.6 05/19/2025    CL 93 (L) 05/19/2025    CO2 29 05/19/2025    BUN 36 (H) 05/19/2025    CREATININE 6.11 (H) 05/19/2025    GLUC 97 05/19/2025    CALCIUM 8.1 (L) 05/19/2025    AGAP 12 05/19/2025    EGFR 8 05/19/2025   , PT/PTT:  Lab Results   Component Value Date     (HH) 05/19/2025     Imaging Studies: Reviewed and interpreted the pertinent studies  EKG, Pathology, and Other Studies: Reviewed and interpreted the pertinent studies    I have spent a total time of 60 minutes in caring for this patient on the day of the visit/encounter including Instructions for management, Patient and family education, Impressions, Counseling / Coordination of care, Documenting in the medical record, Reviewing/placing orders in the medical record (including tests, medications, and/or procedures), Obtaining or reviewing history  , and Communicating with other healthcare  professionals .         [1] No Known Allergies

## 2025-05-19 NOTE — PLAN OF CARE
Post-Dialysis RN Treatment Note    Blood Pressure:  Pre 107/54 mm/Hg  Post 119/59 mmHg   EDW:  95 kg    Weight:  Pre 95.3 kg   Post 93.2 kg   Mode of weight measurement: Bed Scale   Volume Removed:  2,000 ml    Treatment duration: 210 minutes    NS given:  No    Treatment shortened Yes, describe: 3.5 instead of 4 hours d/t patient census, Dr. Hunt agreeable.   Medications given during Rx: None Reported   Estimated Kt/V:  1.07   Access type: AV fistula   Needle Gauge: 16   Access Issues: Yes, describe: took longer time to control bleeding from decannulation of both access.    Report called to primary nurse:   Yes, via ESC to IC RN      Started patient on hemodialysis treatment with UF goal of 2L net as tolerated per conversation with Dr. Hunt x 3.5 hours x 2K bath for serum k+ of 4.6 today 5/19/25.    Problem: METABOLIC, FLUID AND ELECTROLYTES - ADULT  Goal: Electrolytes maintained within normal limits  Description: INTERVENTIONS:  - Monitor labs and assess patient for signs and symptoms of electrolyte imbalances  - Administer electrolyte replacement as ordered  - Monitor response to electrolyte replacements, including repeat lab results as appropriate  - Instruct patient on fluid and nutrition as appropriate  Outcome: Progressing  Goal: Fluid balance maintained  Description: INTERVENTIONS:  - Monitor labs   - Monitor I/O and WT  - Instruct patient on fluid and nutrition as appropriate  - Assess for signs & symptoms of volume excess or deficit  Outcome: Progressing

## 2025-05-19 NOTE — ASSESSMENT & PLAN NOTE
Lab Results   Component Value Date    EGFR 8 05/19/2025    EGFR 13 05/18/2025    EGFR 13 05/17/2025    CREATININE 6.11 (H) 05/19/2025    CREATININE 4.34 (H) 05/18/2025    CREATININE 4.31 (H) 05/17/2025     Hx of HD 3x per week.  Access, left AV fistula.  Nephro following    -Trend renal functioning  -Plan for HD if BP stabilizes  Renal Diet  Fluid restriction 1-1.5L/d  Adjust medications to GFR<10  Avoid opioids   Patent is chronically hypotensive , currently on NE. Will attempt iHD . If he doesn't tolerate then we will need to do CRRT  Cirrhotic liver morphology on CT  Monitor CBC  Epogen contraindicated in setting of stroke

## 2025-05-19 NOTE — ASSESSMENT & PLAN NOTE
Lab Results   Component Value Date    EGFR 8 05/19/2025    EGFR 13 05/18/2025    EGFR 13 05/17/2025    CREATININE 6.11 (H) 05/19/2025    CREATININE 4.34 (H) 05/18/2025    CREATININE 4.31 (H) 05/17/2025   -- Mircera as an outpatient hemoglobin stable

## 2025-05-19 NOTE — ASSESSMENT & PLAN NOTE
LVEF 25-30%  LifeVest without arrhythmia or therapies delivered  -Metoprolol and Entresto held due to above

## 2025-05-19 NOTE — DISCHARGE INSTR - OTHER ORDERS
Skin care plans:  1-Hydraguard/Silicone Cream to bilateral sacrum, buttock, anterior knees, and heels BID and PRN  2-Elevate heels to offload pressure.  3-Ehob cushion in chair when out of bed.  4-Moisturize skin daily with skin nourishing cream.  5-Turn/reposition q2h or when medically stable for pressure re-distribution on skin.   6-Left 5th finger and left heel- Paint wounds with betadine daily, may keep open to air.

## 2025-05-19 NOTE — ASSESSMENT & PLAN NOTE
-- Patient seen and examined on hemodialysis currently tolerating procedure well  -- Saturating 100% on room air.  Blood pressures currently stable on a low-dose of Levophed  -- Dialysis settings discussed with HD nurse.  3.5 hours, F1 80 dialyzer, blood flow rate 400 mL/min, dialysate flow rate 600 mL/min, aiming for 2 L ultrafiltration as blood pressure tolerates slightly above dry weight, decreased temperature to 35 degrees, 138 sodium, 2K bath  -- Discussed with the HD nurse and wife

## 2025-05-19 NOTE — WOUND OSTOMY CARE
Consult Note - Wound   Asad Galloway 65 y.o. male MRN: [id number]  Unit/Bed#: ICU 03 Encounter: [id number]        Assessment :   Patient admitted to [facility] due to shock. History of ESRD on hemodialysis, CVA, GERD, CAD. Wound care nurse consulted for sacral wound and toe.feet wounds. Patient is agreeable to assessment, alert and awake, assist of 2 to turn for assessment, is in ICU on a critical care low air loss mattress, is an assist with care, incontinent of bowel, no bladder incontinence noted, patient became physically and verbally aggressive during the assessment.     1. Bilateral sacrum, buttock- Skin is dry, intact, blanchable.     2. Left 4th finger, chronic wound- Wound is round in shape, 100% covered in brown well adhered eschar, no drainage noted. Lani-wound is dry, intact, calloused.     3. Bilateral anterior knee- Wounds on assessment have resolved, skin is dry, intact, with pink epithelialized skin and dry yellow scabbing, no open aspects or drainage noted.     4. Right great toe- Wound on assessment has resolved, skin is dry, intact, with pink epithelialized skin.    5. Left heel- Wound is oval in shape, 100% covered in brown will adhered eschar, no drainage noted. Lani-wound is dry, intact, calloused.     6. Bilateral elbows, hips, and right heel- Skin is dry, intact, blanchable.     No induration, fluctuance, odor, warmth, redness, or purulence noted to the above noted wounds. Primary nurse aware of plan of care. See flow sheets for more detailed assessment findings. Will follow along.    Skin care plans:  1-Hydraguard/Silicone Cream to bilateral sacrum, buttock, anterior knees, and heels BID and PRN  2-Elevate heels to offload pressure.  3-Ehob cushion in chair when out of bed.  4-Moisturize skin daily with skin nourishing cream.  5-Turn/reposition q2h or when medically stable for pressure re-distribution on skin.   6-Left 5th finger and left heel- Paint wounds with betadine daily, may keep open to  air.      Wound 07/15/24 Heel Left (Active)   Wound Image   05/19/25 1316   Wound Description Dry;Brown;Eschar 05/19/25 1316   Non-staged Wound Description Not applicable 05/19/25 1316   Wound Length (cm) 0.5 cm 05/19/25 1316   Wound Width (cm) 1 cm 05/19/25 1316   Wound Depth (cm) 0 cm 05/19/25 1316   Wound Surface Area (cm^2) 0.39 cm^2 05/19/25 1316   Wound Volume (cm^3) 0 cm^3 05/19/25 1316   Calculated Wound Volume (cm^3) 0 cm^3 05/19/25 1316   Drainage Amount None 05/19/25 1316   Lani-wound Assessment Dry;Intact;Callus 05/19/25 1316       Wound 04/17/25 Knee Anterior;Right (Active)   Wound Image   05/18/25 0917   Wound Description Dry;Intact;Epithelialization 05/19/25 1310   Non-staged Wound Description Not applicable 05/19/25 1310   Wound Length (cm) 0 cm 05/19/25 1310   Wound Width (cm) 0 cm 05/19/25 1310   Wound Depth (cm) 0 cm 05/19/25 1310   Wound Surface Area (cm^2) 0 cm^2 05/19/25 1310   Wound Volume (cm^3) 0 cm^3 05/19/25 1310   Calculated Wound Volume (cm^3) 0 cm^3 05/19/25 1310   Change in Wound Size % 100 05/19/25 1310   Dressing Open to air 05/19/25 0400       Wound 04/17/25 Toe D1, great Anterior;Right (Active)   Wound Image   05/19/25 1309   Wound Description Dry;Intact;Epithelialization 05/19/25 1309   Wound Length (cm) 0 cm 05/19/25 1309   Wound Width (cm) 0 cm 05/19/25 1309   Wound Depth (cm) 0 cm 05/19/25 1309   Wound Surface Area (cm^2) 0 cm^2 05/19/25 1309   Wound Volume (cm^3) 0 cm^3 05/19/25 1309   Calculated Wound Volume (cm^3) 0 cm^3 05/19/25 1309   Dressing Open to air 05/19/25 0400       Wound 04/26/25 Knee Anterior;Left (Active)   Wound Image   05/19/25 1310   Wound Description Dry;Intact;Epithelialization;Other (Comment) 05/19/25 1310   Non-staged Wound Description Not applicable 05/19/25 1310   Wound Length (cm) 0 cm 05/19/25 1310   Wound Width (cm) 0 cm 05/19/25 1310   Wound Depth (cm) 0 cm 05/19/25 1310   Wound Surface Area (cm^2) 0 cm^2 05/19/25 1310   Wound Volume (cm^3) 0 cm^3  05/19/25 1310   Calculated Wound Volume (cm^3) 0 cm^3 05/19/25 1310   Dressing Open to air 05/19/25 0400       Wound Finger D5, small Left (Active)   Wound Image   05/19/25 1316   Wound Description Dry;Brown;Eschar 05/19/25 1316   Non-staged Wound Description Not applicable 05/19/25 1316   Wound Length (cm) 0.8 cm 05/19/25 1316   Wound Width (cm) 0.8 cm 05/19/25 1316   Wound Depth (cm) 0 cm 05/19/25 1316   Wound Surface Area (cm^2) 0.5 cm^2 05/19/25 1316   Wound Volume (cm^3) 0 cm^3 05/19/25 1316   Calculated Wound Volume (cm^3) 0 cm^3 05/19/25 1316   Drainage Amount None 05/19/25 1316   Lani-wound Assessment Dry;Intact 05/19/25 1316       Contact through [facility] Secure Chat with any questions  Wound Care will continue to follow while inpatient    Rita BROTHERSN RN CWON  Wound and Ostomy care

## 2025-05-19 NOTE — ASSESSMENT & PLAN NOTE
Patient requiring norepinephrine to maintain maps above 65.  Borderline BP.  Low blood blood pressure postdialysis and fluid removal that day in the setting of severe AS and cardiomyopathy.  Also consideration of sepsis/infection, suspected pneumonia on CT.  Sepsis versus some component of cardiogenic shock    Plan:  Wean levo as tolerated to maintain MAP greater than 65  Treatment for pneumonia see plan under sepsis.  Holding home metoprolol and Entresto in setting of hypotension  Continue midodrine 3 times daily

## 2025-05-19 NOTE — PROGRESS NOTES
Vancomycin IV Pharmacy-to-Dose Consultation     Vancomycin has been discontinued.  Pharmacy will sign off.  Please contact or re-consult with questions.    Mandy Julien, Pharmacist

## 2025-05-19 NOTE — ASSESSMENT & PLAN NOTE
Lab Results   Component Value Date    EGFR 8 05/19/2025    EGFR 13 05/18/2025    EGFR 13 05/17/2025    CREATININE 6.11 (H) 05/19/2025    CREATININE 4.34 (H) 05/18/2025    CREATININE 4.31 (H) 05/17/2025

## 2025-05-19 NOTE — ASSESSMENT & PLAN NOTE
Wt Readings from Last 3 Encounters:   05/19/25 95.3 kg (210 lb 1.6 oz)   05/02/25 94.3 kg (208 lb)   05/01/25 94.8 kg (209 lb)     Metoprolol, entresto on hold due to low blood pressure    CHF with acute exacerbation, LVEF 30 to 35%.  Acute volume overload due to poor HD sessions.  Up 10K from dry weight.  Aortic stenosis, moderate to severe, ongoing TAVR workup [place]   Echo 5/18: LVEF 25%. Systolic function is severely reduced.  Moderate/severe AAS, mild/moderate TR    Plan:  Resume GDMT when blood pressures allow  Cardiology following

## 2025-05-19 NOTE — PROCEDURES
NEPHROLOGY HOSPITAL PROGRESS NOTE   Asad Galloway 65 y.o. male MRN: [id number]  Unit/Bed#: ICU 03 Encounter: [id number]  Reason for Consult: Dialysis management    Brief History of Admission -  65 y.o. man  with PMH of ESRD on HD MWF  via AV fistula, anemia, hypertension, CHF, ischemic cardiomyopathy present unresponsive. Nephrolgy is consulted for management of ESRD     Assessment & Plan  ESRD on dialysis ([facility])  Lab Results   Component Value Date    EGFR 8 05/19/2025    EGFR 13 05/18/2025    EGFR 13 05/17/2025    CREATININE 6.11 (H) 05/19/2025    CREATININE 4.34 (H) 05/18/2025    CREATININE 4.31 (H) 05/17/2025   #ESRD on HD MWF:  [place] [facility] [place] fistula  EDW 95 kg, unable to achieve dry weight as per family because he drinks too much water.  Fluid restriction less than 1.5 L/day  Patient seen on hemodialysis today.  Currently on a low-dose of Levophed.  Blood pressure currently stable saturating 100% on room air aiming for 2 L ultrafiltration.  Discussed with the wife at bedside  Anemia  Hemoglobin stable at 8.7  Patient on Mircera as an outpatient  Hypotension  Noncompliant with fluid restriction as an outpatient.  Patient has underlying cardiomyopathy and severe aortic stenosis.  Blood pressure currently stable slightly above dry weight will aim for 2 L ultrafiltration as blood pressure tolerates  Tolerated dialysis well yesterday  Currently on a low-dose of Levophed  History of stroke in adulthood    Mood disorder (HCC)    Absence of toe of right foot (HCC)    Chronic deep vein thrombosis (DVT) of distal vein of right lower extremity (HCC)    Anemia due to chronic kidney disease, on chronic dialysis (HCC)  Lab Results   Component Value Date    EGFR 8 05/19/2025    EGFR 13 05/18/2025    EGFR 13 05/17/2025    CREATININE 6.11 (H) 05/19/2025    CREATININE 4.34 (H) 05/18/2025    CREATININE 4.31 (H) 05/17/2025   -- Mircera as an outpatient hemoglobin stable  Cirrhosis (HCC)    Sepsis (HCC)  --  Management as per critical care  Pneumonia  -- Currently on IV cefepime  Shock (HCC)  -- Low-dose Levophed for ultrafiltration on dialysis on broad-spectrum IV antibiotics  Goals of care, counseling/discussion  -- Ongoing  Nonrheumatic aortic valve stenosis  -- Patient plan for surgery at [facility] in approximately 1 week  Encounter for dialysis ([facility])  -- Patient seen and examined on hemodialysis currently tolerating procedure well  -- Saturating 100% on room air.  Blood pressures currently stable on a low-dose of Levophed  -- Dialysis settings discussed with HD nurse.  3.5 hours, F1 80 dialyzer, blood flow rate 400 mL/min, dialysate flow rate 600 mL/min, aiming for 2 L ultrafiltration as blood pressure tolerates slightly above dry weight, decreased temperature to 35 degrees, 138 sodium, 2K bath  -- Discussed with the HD nurse and wife      I have reviewed the nephrology recommendations including assessment and plan, with patient and wife and HD nurse, and we are in agreement with renal plan including the information outlined above.    SUBJECTIVE / 24H INTERVAL HISTORY:  No new complaints feels short of breath    OBJECTIVE:  Current Weight: Weight - Scale: 95.3 kg (210 lb 1.6 oz)  Vitals:    05/19/25 0855 05/19/25 0900 05/19/25 0930 05/19/25 1000   BP: 107/53 99/52 122/56 113/56   BP Location:       Pulse: 88 90 86 78   Resp: 21 20 (!) 26 19   Temp:       TempSrc:       SpO2: 98% 98% 100% 99%   Weight:       Height:           Intake/Output Summary (Last 24 hours) at 5/19/2025 1012  Last data filed at 5/19/2025 0855  Gross per 24 hour   Intake 1565.83 ml   Output 2300 ml   Net -734.17 ml     General: Chronically ill-appearing but no acute distress  HEENT: Dry and pale mucous membranes  Neck: Supple  Skin: Dry no rashes  Cardiovascular: S1-S2 appreciated, positive murmur  Lungs: Coarse breath sounds but good air entry  Abdomen: Nontender nondistended  Extremities: Positive edema  Neurologic: No  "asterixis  Access: Audible bruit and palpable thrill      Medications:  Current Medications[1]    Laboratory Results:  Results from last 7 days   Lab Units 05/19/25  0321 05/18/25  1139 05/18/25  0432 05/18/25  0024 05/17/25  1850 05/17/25  1814 05/17/25  1700 05/17/25  0828 05/17/25  0502 05/17/25  0334 05/17/25  0305 05/14/25  1137   WBC Thousand/uL 9.33  --  6.72  --   --   --   --   --  6.11 4.96  --  5.29   HEMOGLOBIN g/dL 8.7* 9.5* 8.5* 8.6* 9.5* 8.8* 7.6*  --  9.8* 9.7*  --  9.7*   I STAT HEMOGLOBIN g/dl  --   --   --   --   --   --   --   --   --   --  11.2*  --    HEMATOCRIT % 27.9* 29.6* 27.3* 27.2* 30.2* 29.2* 26.2*  --  31.0* 31.1*  --  31.5*   HEMATOCRIT, ISTAT %  --   --   --   --   --   --   --   --   --   --  33*  --    PLATELETS Thousands/uL 104*  --  77*  --   --   --   --   --  95* 91*  --  99*   POTASSIUM mmol/L 4.6  --  4.6  --   --   --   --  3.8 5.2 5.0  --   --    CHLORIDE mmol/L 93*  --  94*  --   --   --   --  100 93* 92*  --   --    CO2 mmol/L 29  --  31  --   --   --   --  27 29 28  --   --    CO2, I-STAT mmol/L  --   --   --   --   --   --   --   --   --   --  27  --    BUN mg/dL 36*  --  24  --   --   --   --  30* 32* 32*  --   --    CREATININE mg/dL 6.11*  --  4.34*  --   --   --   --  4.31* 4.94* 5.02*  --   --    CALCIUM mg/dL 8.1*  --  7.9*  --   --   --   --  6.5* 7.7* 7.8*  --   --    MAGNESIUM mg/dL  --   --  2.1  --   --   --   --  1.7* 2.1 2.1  --   --    PHOSPHORUS mg/dL 4.8*  --  3.8  --   --   --   --  4.4* 5.5*  --   --   --    GLUCOSE, ISTAT mg/dl  --   --   --   --   --   --   --   --   --   --  152*  --        Portions of the record may have been created with voice recognition software. Occasional wrong word or \"sound a like\" substitutions may have occurred due to the inherent limitations of voice recognition software. Read the chart carefully and recognize, using context, where substitutions have occurred.If you have any questions, please contact the dictating " provider.         [1]   Current Facility-Administered Medications:     acetaminophen (TYLENOL) oral suspension 650 mg, 650 mg, Oral, Q6H PRN, BRITTANY Luna, 650 mg at 05/18/25 0645    aspirin chewable tablet 81 mg, 81 mg, Oral, Daily, Belén Calle MD, 81 mg at 05/19/25 0834    atorvastatin (LIPITOR) tablet 40 mg, 40 mg, Oral, Daily With Dinner, Aniyah Guerrero DO, 40 mg at 05/17/25 1623    cefepime (MAXIPIME) 1,000 mg in dextrose 5 % 50 mL IVPB, 1,000 mg, Intravenous, Q24H, Almita Rodriguez MD    chlorhexidine (PERIDEX) 0.12 % oral rinse 15 mL, 15 mL, Mouth/Throat, Q12H MICH, Willard Rodriguez MD, 15 mL at 05/19/25 0842    cyanocobalamin (VITAMIN B-12) tablet 250 mcg, 250 mcg, Oral, Daily, Belén Calle MD, 250 mcg at 05/19/25 0834    divalproex sodium (DEPAKOTE SPRINKLE) capsule 250 mg, 250 mg, Oral, Q12H MICH, Almita Rodriguez MD    doxycycline hyclate (VIBRAMYCIN) capsule 100 mg, 100 mg, Oral, Q12H MICH, Almita Rodriguez MD    heparin (porcine) 25,000 units in 0.45% NaCl 250 mL infusion (premix), 3-20 Units/kg/hr (Order-Specific), Intravenous, Titrated, Chanel Eller MD, Last Rate: 10 mL/hr at 05/18/25 2350, 11.1 Units/kg/hr at 05/18/25 2350    heparin (porcine) injection 2,000 Units, 2,000 Units, Intravenous, Q6H PRN, Chanel Eller MD, 2,000 Units at 05/18/25 1609    heparin (porcine) injection 4,000 Units, 4,000 Units, Intravenous, Q6H PRN, Chanel Eller MD, 4,000 Units at 05/18/25 0905    [Held by provider] metoprolol succinate (TOPROL-XL) 24 hr tablet 25 mg, 25 mg, Oral, BID, Aniyah Guerrero DO    midodrine (PROAMATINE) tablet 10 mg, 10 mg, Oral, Before Dialysis, Joselyn Reyes Bahamonde, MD, 10 mg at 05/19/25 0834    midodrine (PROAMATINE) tablet 15 mg, 15 mg, Oral, TID AC, Almita Rodriguez MD    NOREPINEPHRINE 4 MG  ML NSS (CMPD ORDER) infusion, 1-30 mcg/min, Intravenous, Titrated, Chanel Eller MD, Last Rate: 11.3 mL/hr  at 05/18/25 2018, 3 mcg/min at 05/18/25 2018    pantoprazole (PROTONIX) injection 40 mg, 40 mg, Intravenous, Q12H MICH, BRITTANY Durbin, 40 mg at 05/19/25 0835    prasugrel (EFFIENT) tablet 10 mg, 10 mg, Oral, Daily, Belén Calle MD, 10 mg at 05/18/25 0807

## 2025-05-19 NOTE — ASSESSMENT & PLAN NOTE
Hypothermic after rectal temperature in ED.  Bob hugger as needed.    Unresponsive on arrival to the ED. Intubated for airway protection and for hypoxia.  Patient extubated 5/18.

## 2025-05-19 NOTE — ASSESSMENT & PLAN NOTE
3/17/25 ([place]) - [facility] 90% pLAD stenosis, severe plaque burden, in-stent restenosis with areas of underexpansion and calcified nodules in vision between overlap of stents --> POBA with drug-coated balloon for in-stent restenosis, no new stent  Status post PCI to LAD  Continue aspirin, prasugrel with recent PCI  Note: He is noted to be Plavix nonresponder at OSH with history of in-stent restenosis

## 2025-05-19 NOTE — ASSESSMENT & PLAN NOTE
Unspecified mood disorder, patient previously on Depakote.  Was discontinued during his previous admission in [facility] due to concerns for thrombocytopenia.

## 2025-05-20 ENCOUNTER — APPOINTMENT (OUTPATIENT)
Facility: CLINIC | Age: 65
End: 2025-05-20
Payer: MEDICARE

## 2025-05-20 LAB
ALBUMIN SERPL BCG-MCNC: 3.8 G/DL (ref 3.5–5)
ALP SERPL-CCNC: 112 U/L (ref 34–104)
ALT SERPL W P-5'-P-CCNC: 6 U/L (ref 7–52)
ANION GAP SERPL CALCULATED.3IONS-SCNC: 9 MMOL/L (ref 4–13)
AST SERPL W P-5'-P-CCNC: 8 U/L (ref 13–39)
BILIRUB SERPL-MCNC: 1.08 MG/DL (ref 0.2–1)
BUN SERPL-MCNC: 26 MG/DL (ref 5–25)
CA-I BLD-SCNC: 1 MMOL/L (ref 1.12–1.32)
CALCIUM SERPL-MCNC: 8.2 MG/DL (ref 8.4–10.2)
CHLORIDE SERPL-SCNC: 95 MMOL/L (ref 96–108)
CO2 SERPL-SCNC: 30 MMOL/L (ref 21–32)
CREAT SERPL-MCNC: 4.86 MG/DL (ref 0.6–1.3)
ERYTHROCYTE [DISTWIDTH] IN BLOOD BY AUTOMATED COUNT: 16.9 % (ref 11.6–15.1)
GFR SERPL CREATININE-BSD FRML MDRD: 11 ML/MIN/1.73SQ M
GLUCOSE SERPL-MCNC: 107 MG/DL (ref 65–140)
GLUCOSE SERPL-MCNC: 123 MG/DL (ref 65–140)
GLUCOSE SERPL-MCNC: 169 MG/DL (ref 65–140)
HCT VFR BLD AUTO: 27.3 % (ref 36.5–49.3)
HGB BLD-MCNC: 8.6 G/DL (ref 12–17)
INR PPP: 1.27 (ref 0.85–1.19)
MAGNESIUM SERPL-MCNC: 2 MG/DL (ref 1.9–2.7)
MCH RBC QN AUTO: 31.4 PG (ref 26.8–34.3)
MCHC RBC AUTO-ENTMCNC: 31.5 G/DL (ref 31.4–37.4)
MCV RBC AUTO: 100 FL (ref 82–98)
PHOSPHATE SERPL-MCNC: 3.8 MG/DL (ref 2.3–4.1)
PLATELET # BLD AUTO: 119 THOUSANDS/UL (ref 149–390)
PMV BLD AUTO: 11 FL (ref 8.9–12.7)
POTASSIUM SERPL-SCNC: 4.2 MMOL/L (ref 3.5–5.3)
PROT SERPL-MCNC: 6.4 G/DL (ref 6.4–8.4)
PROTHROMBIN TIME: 16.7 SECONDS (ref 12.3–15)
RBC # BLD AUTO: 2.74 MILLION/UL (ref 3.88–5.62)
SODIUM SERPL-SCNC: 134 MMOL/L (ref 135–147)
WBC # BLD AUTO: 9.05 THOUSAND/UL (ref 4.31–10.16)

## 2025-05-20 PROCEDURE — 82948 REAGENT STRIP/BLOOD GLUCOSE: CPT

## 2025-05-20 PROCEDURE — 85027 COMPLETE CBC AUTOMATED: CPT

## 2025-05-20 PROCEDURE — 82330 ASSAY OF CALCIUM: CPT

## 2025-05-20 PROCEDURE — 99232 SBSQ HOSP IP/OBS MODERATE 35: CPT | Performed by: INTERNAL MEDICINE

## 2025-05-20 PROCEDURE — 99232 SBSQ HOSP IP/OBS MODERATE 35: CPT | Performed by: STUDENT IN AN ORGANIZED HEALTH CARE EDUCATION/TRAINING PROGRAM

## 2025-05-20 PROCEDURE — 83735 ASSAY OF MAGNESIUM: CPT

## 2025-05-20 PROCEDURE — 99291 CRITICAL CARE FIRST HOUR: CPT

## 2025-05-20 PROCEDURE — 84100 ASSAY OF PHOSPHORUS: CPT

## 2025-05-20 PROCEDURE — 90935 HEMODIALYSIS ONE EVALUATION: CPT | Performed by: INTERNAL MEDICINE

## 2025-05-20 PROCEDURE — 85610 PROTHROMBIN TIME: CPT

## 2025-05-20 PROCEDURE — 93005 ELECTROCARDIOGRAM TRACING: CPT

## 2025-05-20 PROCEDURE — 80053 COMPREHEN METABOLIC PANEL: CPT

## 2025-05-20 RX ORDER — ALBUMIN HUMAN 50 G/1000ML
25 SOLUTION INTRAVENOUS ONCE
Status: COMPLETED | OUTPATIENT
Start: 2025-05-20 | End: 2025-05-21

## 2025-05-20 RX ORDER — CALCIUM GLUCONATE 20 MG/ML
1 INJECTION, SOLUTION INTRAVENOUS ONCE
Status: COMPLETED | OUTPATIENT
Start: 2025-05-20 | End: 2025-05-20

## 2025-05-20 RX ORDER — PANTOPRAZOLE SODIUM 40 MG/1
40 TABLET, DELAYED RELEASE ORAL
Status: DISCONTINUED | OUTPATIENT
Start: 2025-05-20 | End: 2025-05-20

## 2025-05-20 RX ORDER — LORAZEPAM 2 MG/ML
0.5 INJECTION INTRAMUSCULAR ONCE
Status: COMPLETED | OUTPATIENT
Start: 2025-05-20 | End: 2025-05-20

## 2025-05-20 RX ORDER — HYDROXYZINE HYDROCHLORIDE 25 MG/1
25 TABLET, FILM COATED ORAL ONCE
Status: COMPLETED | OUTPATIENT
Start: 2025-05-20 | End: 2025-05-20

## 2025-05-20 RX ADMIN — APIXABAN 2.5 MG: 2.5 TABLET, FILM COATED ORAL at 17:35

## 2025-05-20 RX ADMIN — CHLORHEXIDINE GLUCONATE 15 ML: 1.2 SOLUTION ORAL at 08:49

## 2025-05-20 RX ADMIN — CYANOCOBALAMIN TAB 500 MCG 250 MCG: 500 TAB at 08:50

## 2025-05-20 RX ADMIN — DIVALPROEX SODIUM 250 MG: 125 CAPSULE, COATED PELLETS ORAL at 08:50

## 2025-05-20 RX ADMIN — PRASUGREL 10 MG: 10 TABLET, FILM COATED ORAL at 09:19

## 2025-05-20 RX ADMIN — CALCIUM GLUCONATE 1 G: 20 INJECTION, SOLUTION INTRAVENOUS at 07:13

## 2025-05-20 RX ADMIN — MIDODRINE HYDROCHLORIDE 15 MG: 5 TABLET ORAL at 08:49

## 2025-05-20 RX ADMIN — ATORVASTATIN CALCIUM 40 MG: 40 TABLET, FILM COATED ORAL at 17:35

## 2025-05-20 RX ADMIN — CEFEPIME 1000 MG: 1 INJECTION, POWDER, FOR SOLUTION INTRAMUSCULAR; INTRAVENOUS at 17:36

## 2025-05-20 RX ADMIN — APIXABAN 2.5 MG: 2.5 TABLET, FILM COATED ORAL at 08:50

## 2025-05-20 RX ADMIN — HYDROXYZINE HYDROCHLORIDE 25 MG: 25 TABLET, FILM COATED ORAL at 14:17

## 2025-05-20 RX ADMIN — CHLORHEXIDINE GLUCONATE 15 ML: 1.2 SOLUTION ORAL at 22:00

## 2025-05-20 RX ADMIN — LORAZEPAM 0.5 MG: 2 INJECTION INTRAMUSCULAR; INTRAVENOUS at 22:59

## 2025-05-20 RX ADMIN — ASPIRIN 81 MG: 81 TABLET, CHEWABLE ORAL at 08:49

## 2025-05-20 RX ADMIN — MIDODRINE HYDROCHLORIDE 15 MG: 5 TABLET ORAL at 17:35

## 2025-05-20 RX ADMIN — PANTOPRAZOLE SODIUM 40 MG: 40 TABLET, DELAYED RELEASE ORAL at 08:49

## 2025-05-20 RX ADMIN — MIDODRINE HYDROCHLORIDE 15 MG: 5 TABLET ORAL at 12:00

## 2025-05-20 RX ADMIN — DOXYCYCLINE 100 MG: 100 CAPSULE ORAL at 08:49

## 2025-05-20 RX ADMIN — ALBUMIN (HUMAN) 25 G: 12.5 INJECTION, SOLUTION INTRAVENOUS at 10:18

## 2025-05-20 RX ADMIN — NOREPINEPHRINE BITARTRATE 3 MCG/MIN: 1 INJECTION INTRAVENOUS at 10:22

## 2025-05-20 NOTE — ASSESSMENT & PLAN NOTE
Lab Results   Component Value Date    EGFR 11 05/20/2025    EGFR 8 05/19/2025    EGFR 13 05/18/2025    CREATININE 4.86 (H) 05/20/2025    CREATININE 6.11 (H) 05/19/2025    CREATININE 4.34 (H) 05/18/2025   -- Mircera as an outpatient hemoglobin stable

## 2025-05-20 NOTE — ASSESSMENT & PLAN NOTE
3/17/25 ([place]) - [facility] 90% pLAD stenosis, severe plaque burden, in-stent restenosis with areas of underexpansion and calcified nodules in vision between overlap of stents --> POBA with drug-coated balloon for in-stent restenosis, no new stent  Status post PCI to LAD  Continue aspirin, prasugrel with recent PCI  Note: He is noted to be Plavix nonresponder at OSH with history of in-stent restenosis

## 2025-05-20 NOTE — QUICK NOTE
"1400: Patient noted to be extremely agitated, throwing his bedpan.  Repeatedly stating \"when can I get out of here??\" and \" I want to die, this hospital makes me want to die\".  Security contacted.  Acute agitation, in the setting of patient's hospitalization and known labile mood and mood disorder    Plan  Hydroxyzine 25 mg one-time dose  Check QTc, Seroquel as needed this may be an option  "

## 2025-05-20 NOTE — ASSESSMENT & PLAN NOTE
Wt Readings from Last 3 Encounters:   05/20/25 93 kg (205 lb 0.4 oz)   05/02/25 94.3 kg (208 lb)   05/01/25 94.8 kg (209 lb)     Metoprolol, entresto on hold due to low blood pressure    CHF with acute exacerbation, LVEF 30 to 35%.  Acute volume overload due to poor HD sessions.  Up 10K from dry weight.  Aortic stenosis, moderate to severe, ongoing TAVR workup [place]   Echo 5/18: LVEF 25%. Systolic function is severely reduced.  Moderate/severe AAS, mild/moderate TR    Plan:  Resume GDMT when blood pressures allow  Cardiology following

## 2025-05-20 NOTE — ASSESSMENT & PLAN NOTE
Lab Results   Component Value Date    EGFR 11 05/20/2025    EGFR 8 05/19/2025    EGFR 13 05/18/2025    CREATININE 4.86 (H) 05/20/2025    CREATININE 6.11 (H) 05/19/2025    CREATININE 4.34 (H) 05/18/2025     Hx of HD 3x per week.  Access, left AV fistula.  Nephro following    -Trend renal functioning  -Plan for HD if BP stabilizes  Renal Diet  Fluid restriction 1-1.5L/d  Adjust medications to GFR<10  Avoid opioids   Cirrhotic liver morphology on CT  Monitor CBC  Epogen contraindicated in setting of stroke

## 2025-05-20 NOTE — QUICK NOTE
Critical Care Interval Transfer Note:    Brief Hospital Summary: 65-year-old male with ESRD on dialysis MWF, L arm fistula, HFrEF 25%, moderate/severe AS, CAD s/p multiple PCI, IDDM2, HTN, HLD, thombocytopenia, CVA 2018 residual cog def, cirhosis who presented unresponsive from home on 5/17 intubated for airway protection and hypoxia, initially requiring pressors to maintain MAPs (baseline MAPs are around 60s). Recently switched to home HD with less removal of fluid and was up 15 lbs. He is back to his dry weight after getting dialyzed x3 since admission. On room air although we are treating for suspected atypical PNA. He was weaned off levo this afternoon and has maintained MAPS >65.      Barriers to discharge:   Monitoring MAPs goal >65  midodrine 15mg TID  Holding GDMT iso hypotension  HD session planned 5/21  Possible atypical PNA, on doxycycline and IV cefepime  PT/OT evaluations     Consults: IP CONSULT TO CASE MANAGEMENT  IP CONSULT TO NEPHROLOGY  IP CONSULT TO CARDIOLOGY  IP CONSULT TO PALLIATIVE CARE    Recommended to review admission imaging for incidental findings and document in discharge navigator: Chart reviewed, no known incidental findings noted at this time.      Discharge Plan: Anticipate discharge in 24-48 hrs to discharge location to be determined pending rehab evaluations.       Patient seen and evaluated by Critical Care today and deemed to be appropriate for transfer to Med Surg. Spoke to Dr. Singleton from [facility] to accept transfer. Critical care can be contacted via SecureChat with any questions or concerns. Please use the Critical Care AP Role in Secure Chat for any provider inquires until the patient is transferred out of the ICU or until tomorrow at 0600.

## 2025-05-20 NOTE — CASE MANAGEMENT
Case Management Assessment & Discharge Planning Note    Patient name Asad Galloway  Location ICU 03/ICU 03 MRN [id number]  : 1960 Date 2025       Current Admission Date: 2025  Current Admission Diagnosis:Shock ([facility])   Patient Active Problem List    Diagnosis Date Noted    Shock (HCC) 2025    Goals of care, counseling/discussion 2025    Nonrheumatic aortic valve stenosis 2025    Encounter for dialysis ([facility]) 2025    Palliative care by specialist 2025    Sepsis ([facility]) 2025    Pneumonia 2025    Sarcopenia 2025    Impaired mobility and ADLs 2025    Hyperkalemia 2025    Hypotension 2025    Cirrhosis ([facility]) 2025    Fluid overload 2025    Chronic kidney disease-mineral and bone disorder 2025    Anemia due to chronic kidney disease, on chronic dialysis ([facility]) 2025    HFrEF (heart failure with reduced ejection fraction) ([facility]) 2025    SIRS (systemic inflammatory response syndrome) ([facility]) 2025    Acute encephalopathy 2025    Peripheral arterial disease ([facility]) 2025    Type 2 diabetes mellitus ([facility]) 2024    Ambulatory dysfunction 2024    Thrombocytopenia ([facility]) 2024    Chronic deep vein thrombosis (DVT) of distal vein of right lower extremity ([facility]) 2024    Bicytopenia 2024    Obesity (BMI 30.0-34.9) 2024    QT prolongation 2024    Acute embolism and thrombosis of right peroneal vein ([facility]) 2024    Right ankle pain 2024    Pre-diabetes 2024    Hypertensive heart and chronic kidney disease with heart failure and with stage 5 chronic kidney disease, or end stage renal disease ([facility]) 2024    PAD (peripheral artery disease) ([facility]) 2024    Chronic systolic heart failure ([facility]) 2024    Edema of left lower extremity 2023    Rectal bleeding 2023    Presence of external cardiac defibrillator 2023    Diabetic  polyneuropathy associated with type 2 diabetes mellitus (HCC) 03/07/2023    Absence of toe of right foot (HCC) 03/07/2023    Hammer toes of both feet 03/07/2023    Ischemic cardiomyopathy 02/02/2023    Aortic stenosis 02/02/2023    Mood disorder (HCC) 02/02/2023    Old myocardial infarction 02/02/2023    Hyponatremia 02/01/2023    Fall 01/06/2023    SOB (shortness of breath) 10/21/2022    Left arm swelling 10/21/2022    CAD, multiple vessel 07/14/2022    Electrolyte abnormality 05/02/2022    Chest pain 05/01/2022    Generalized weakness 04/19/2022    Primary hypertension 04/06/2022    Emotional lability 01/19/2022    History of 2019 novel coronavirus disease (COVID-19) 12/26/2020    ESRD on dialysis ([facility]) 12/26/2020    Anemia 10/05/2020    S/P arteriovenous (AV) fistula creation 04/27/2020    Pre-kidney transplant, listed 04/27/2020    Urge incontinence 12/20/2019    Anxiety associated with depression 02/28/2019    History of stroke in adulthood 10/04/2018    Abnormal EEG 09/24/2018    Other constipation 08/17/2018    GERD (gastroesophageal reflux disease) 08/13/2018    Elevated alkaline phosphatase level 08/03/2018    Nephrotic range proteinuria 03/28/2018    Dizziness 02/26/2016    Mixed hyperlipidemia 02/26/2016    Antiplatelet or antithrombotic long-term use 02/26/2016      LOS (days): 3  Geometric Mean LOS (GMLOS) (days): 4.9  Days to GMLOS:1.4     OBJECTIVE:  PATIENT READMITTED TO HOSPITAL  Risk of Unplanned Readmission Score: 53.78     Current admission status: Inpatient    Preferred Pharmacy:   [id number] - [place] - [address]  [address] [place] [address]  Phone: [phone] Fax: [phone]    Primary Care Provider: BRITTANY Allen    Primary Insurance: MEDICARE  Secondary Insurance: [place] [employer]    ASSESSMENT:  Active Health Care Proxies       Laverne, [facility] Representative - Spouse   Primary Phone: [phone] (Mobile)                  Patient Information  Admitted from:: Home  Mental Status: Alert  During Assessment patient was accompanied by: Not accompanied during assessment  Assessment information provided by:: Son  Primary Caregiver: Family  Caregiver's Name:: WifeEliana  Caregiver's Relationship to Patient:: Family Member  Support Systems: Spouse/significant other, Son, Daughter, [employer], Family members  County of Residence: [place]  What city do you live in?: [place]  Home entry access options. Select all that apply.: No steps to enter home  Type of Current Residence: Bi-level  Living Arrangements: Lives w/ Spouse/significant other, Lives w/ Daughter, Lives w/ Son    Activities of Daily Living Prior to Admission  Functional Status: Assistance  Completes ADLs independently?: No  Level of ADL dependence: Assistance  Ambulates independently?: Yes  Does patient use assisted devices?: Yes  Assisted Devices (DME) used: Walker  Does patient currently own DME?: Yes  What DME does the patient currently own?: Walker  Does patient have a history of Outpatient Therapy (PT/OT)?: Yes  Does the patient have a history of Short-Term Rehab?: Yes  Does patient have a history of HHC?: Yes  Does patient currently have HHC?: Yes    Current Home Health Care  Type of Current Home Care Services: Home PT, Home OT, Nurse visit  Home Health Agency Name::  [facility]  Current Home Health Follow-Up Provider:: PCP    Patient Information Continued  Income Source: Pension/detention  Does patient have prescription coverage?: Yes  Can the patient afford their medications and any related supplies (such as glucometers or test strips)?: Yes  Does patient receive dialysis treatments?: No  Does patient have a history of substance abuse?: No  Does patient have a history of Mental Health Diagnosis?: No    Means of Transportation  Means of Transport to Appts:: Family transport    DISCHARGE DETAILS:    Discharge planning discussed with:: Pt's son,  Sam  Freedom of Choice: Yes  Comments - Freedom of Choice: CHARLIE with [facility] and would like a transport chair at dc    Contacts  Patient Contacts: Sam (son)  Relationship to Patient:: Family  Contact Method: Phone  Reason/Outcome: Continuity of Care, Emergency Contact, Referral, Discharge Planning    Requested Home Health Care         Home Health Agency Name:: [facility]    Other Referral/Resources/Interventions Provided:  Interventions: Other (Specify), [facility] [place]  Referral Comments: CM spoke with pt's son, Sam. Introduced self/role with dcp. Sam reports that pt resides with his wife and two adult children. Sam and his family reside next door. As per Sam pt has someone with him 24/7. Pt has a very supportive family. Sam reports they are very happy with [facility] and would like this to resume once pt is ready for dc. He reports that pt also does not have a w/c and would benefit from this. Discussed wc vs transport chair. Sam feels transport chair would be better as family would be using it out in the community. Aware this can be ordered closer to dc. Referral to  [facility] in [place].

## 2025-05-20 NOTE — ASSESSMENT & PLAN NOTE
"From CVA.  An appropriate affect and emotional dysregulation related to hospital environment and certain topics such as \"serious disease,\" cancer,\" death/dying\", etc. follows with psychiatry in and out of network institution, who planned for antipsychotic therapy with Depakote and Seroquel.  However, they are holding off on these medications until aortic valve and heart disease can be addressed  "

## 2025-05-20 NOTE — ASSESSMENT & PLAN NOTE
Suspected that his presenting episode of unresponsiveness and low BP due to low blood pressure post dialysis and fluid removal, in the setting of severe AS and cardiomyopathy, sepsis   PTA - takes midodrine prior to HD  -Remains on norepi gtt and wean as able, also scheduled midodrine 15 mg TID  -Will plan to avoid Entresto resumption unless clinical improvement in which he no longer requires midodrine  -Can readdress BB as part of GDMT once off pressors

## 2025-05-20 NOTE — ASSESSMENT & PLAN NOTE
Patient and family amenable to goals of care discussion, palliative care consulted.    Will follow-up with palliative care outpatient.

## 2025-05-20 NOTE — ASSESSMENT & PLAN NOTE
Lab Results   Component Value Date    EGFR 11 05/20/2025    EGFR 8 05/19/2025    EGFR 13 05/18/2025    CREATININE 4.86 (H) 05/20/2025    CREATININE 6.11 (H) 05/19/2025    CREATININE 4.34 (H) 05/18/2025

## 2025-05-20 NOTE — ASSESSMENT & PLAN NOTE
Lab Results   Component Value Date    EGFR 11 05/20/2025    EGFR 8 05/19/2025    EGFR 13 05/18/2025    CREATININE 4.86 (H) 05/20/2025    CREATININE 6.11 (H) 05/19/2025    CREATININE 4.34 (H) 05/18/2025   #ESRD on HD MWF:  Dialysis unit/days: [facility]   Access:AV fistula  EDW 95 kg, unable to achieve dry weight as per family because he drinks too much water.  Fluid restriction less than 1.5 L/day  Underwent hemodialysis yesterday, able to ultrafiltrate 2 L  Blood pressures currently stable saturating 92% on room air  Discussed with wife at bedside.  Patient clinically feeling better  Plan for next hemodialysis tomorrow May 21st

## 2025-05-20 NOTE — SPEECH THERAPY NOTE
Speech-Language Pathology Bedside Swallow Evaluation      Patient Name: Tommy Menjivar    OZZPV'E Date: 7/30/2018       Impression  Pt presents with mild oral dysphagia 2/2 labial weakness, mild to moderate voice disorder, and mild dysarthria  Possible language and cognitive impairment, difficult to discern at present given the pt's periods of acute confusion  Recommendations  Recommend Regular/Thin Liquid diet  ST will continue to follow for speech, language, voice, and cognitive evaluation and treatment  Problem List  Patient Active Problem List   Diagnosis    Type 2 diabetes mellitus with hyperglycemia (HCC)    Hyperlipidemia    Diabetic foot ulcer ([address] )    Essential hypertension    Spinal cord stimulator status    Osteomyelitis of ankle or foot, right, acute (HCC)    Type 2 diabetes mellitus with diabetic neuropathy, with long-term current use of insulin ([address] )    Diabetic nephropathy associated with type 2 diabetes mellitus ([address] )    CKD (chronic kidney disease) stage 3, GFR 30-59 ml/min    Urine test positive for microalbuminuria    Other proteinuria    Microscopic hematuria    Ulnar neuropathy at elbow of left upper extremity    CVA (cerebral vascular accident) ([address] )    Hemiplegia ([address] )    Dysarthria       Past Medical History  Past Medical History:   Diagnosis Date    Diabetes mellitus ([address] )     Hypercholesteremia     Hyperlipidemia     Hypertension     Neuropathy     Obesity     Osteomyelitis ([address] )     last assessed 11/4/16    PVC's (premature ventricular contractions)     sees cardiology Dr Antonio camargo       Past Surgical History  Past Surgical History:   Procedure Laterality Date    ABDOMINAL SURGERY      CHOLECYSTECTOMY      Percutaneous    OTHER SURGICAL HISTORY      "stimulator to control bowel movements"    WA ESOPHAGOGASTRODUODENOSCOPY TRANSORAL DIAGNOSTIC N/A 9/27/2016    Procedure: ESOPHAGOGASTRODUODENOSCOPY (EGD);   Surgeon: Alba Toth MD;  Location: [facility]; Service: Gastroenterology    VA LAP,CHOLECYSTECTOMY N/A 2/29/2016    Procedure: LAPAROSCOPIC CHOLECYSTECTOMY ;  Surgeon: Kathi Bryant DO;  Location: [facility];  Service: General    ROTATOR CUFF REPAIR Right     TOE AMPUTATION Right 10/28/2016    Procedure: 3RD TOE AMPUTATION ;  Surgeon: Suyapa Singh DPM;  Location: [facility];  Service:            Current Medical Status  Pt is a 62 y o  male who presented to [address] [place] [address] with acute R-sided paresis, slurred speech, aphasia, and confusion; tPA was administered with general improvement of symptoms, although the patient has presented with episodes of confusion since then  Per neurology notes, confusion could be related to dementia or pseudodementia  He has no prior history of ST within [facility]  At the time of this evaluation, the pt was A+A, denied complaints apart from being hungry  Pt's wife Mini Barreto and son Rowan Hu present at b/s along with Sam'keny weeks  Imaging Studies:  MRI is pending  CT Head 7/30 Evolving ischemic change in the left basal ganglia  CTA Head/Neck 7/29 Severe focal stenosis distal right petrous/cavernous ICA junction  Moderate focal right supraclinoid ICA stenosis  No large vessel occlusion  No intracranial aneurysm identified  Mild ostial stenosis bilateral vertebral arteries  Moderate focal intracranial left vertebral stenosis  Mild left atherosclerotic calcification right carotid bifurcation  Swallow Information   Current Risks for Dysphagia & Aspiration: CVA     Current Symptoms/Concerns: L facial droop    Current Diet: NPO      Baseline Diet: regular diet and thin liquids      Baseline Assessment   Behavior/Cognition: alert; oriented to person, general place, and month but no other aspects of time; not oriented to situation  Distractible with occasional cues needed for redirection to task   Family members at b/s report that at times yesterday and today he has not recognized them, and that his orientation has also varied  The pt's response to simple and biographical questions actually varied throughout the session; e g , when asked two different times what kind of car he drives, he gave two different answers, neither of which were correct per the family  Speech/Language Status: able to participate in conversation and able to follow commands, albeit tangential in conversation  The pt speaks conversational English but is a native speaker of Khmer  Family reports that pt's speech and voice are still not at baseline but are improved from yesterday  He has demonstrated some speech errors in Khmer per family  Patient Positioning: upright in bed    Pain Status/Interventions/Response to Interventions:  No report of or nonverbal indications of pain  Swallow Mechanism Exam   Facial: left facial droop  Labial: decreased strength and decreased ROM left side  Lingual: WFL  Velum: symmetrical  Mandible: adequate ROM  Dentition: edentulous, does not normally wear dentures to eat  Vocal quality: rough with variable degrees of hoarseness from moment to moment; ?VF paralysis; not baseline per family   Volitional Cough: strong/productive   Swallow Mechanics: timely and coordinated hyolaryngeal elevation with dry swallow        Consistencies Assessed and Performance   Consistencies Administered: ice chips, thin liquids and hard solids  Specific materials administered included ice water via cup and straw, and hard gareth cracker  Oral Stage: mild  Mastication was mildly prolonged but adequate with the materials administered today  Bolus formation and transfer were functional with no significant oral residue noted  No overt s/sx reduced oral control  Pharyngeal Stage: [place]  Swallowing initiation was prompt  Laryngeal rise was palpated and judged to be within functional limits  No coughing, throat clearing, change in vocal quality or respiratory status noted today       Esophageal Concerns: none reported    Risk for Aspiration:  Low    Recommendations: regular diet and thin liquids     Recommended Form of Meds: whole with liquid     Aspiration precautions and compensatory swallowing strategies: upright posture and only feed when fully alert    Results Reviewed with: patient, RN, Myriam Chu and family     Consider referral to: ENT if vocal quality does not continue to improve    Frequency of treatment: 3-5x/wk    Patient Stated Goal: Pt confused, unable to state     Dysphagia Goals per SLP: Patient will tolerate the safest and least restrictive diet without overt s/sx aspiration x100%    Communication Goals per SLP: Patient will independently communicate wants, needs, and ideas via speech or augmentative/alternative communication x100%    Cognition Goals per SLP: Patient will independently perform organizational/problem-solving tasks within ADLs x100%    Pt/Family Education: initiated re: role of SLP in acute care, POC  potential effects of CVA on speech, language, and swallow function  Pt and caregivers would benefit from continued education  Pt's son in particular is extremely concerned re: pt's potential recovery and his recent confusion         Speech Therapy Prognosis   Prognosis: good for swallowing; deferred for speech, language, and cognition    Prognosis Considerations: medical status and cognitive status      [Impressions/Recommendations located at top] No assistance needed

## 2025-05-20 NOTE — ASSESSMENT & PLAN NOTE
Unspecified mood disorder, patient previously on Depakote.  Was discontinued during his previous admission in [facility] due to concerns for thrombocytopenia.

## 2025-05-20 NOTE — PROGRESS NOTES
"Progress Note - Palliative Care   Name: Asad Galloway 65 y.o. male I MRN: [id number]  Unit/Bed#: ICU 03 I Date of Admission: 5/17/2025   Date of Service: 5/20/2025 I Hospital Day: 3     Assessment & Plan  Shock (HCC)  Per critical care team  Goals of care, counseling/discussion  Continue disease directed cares without limits    Despite understanding that with his chronic conditions he is likely to be re-hospitalized, he wishes to continue for the possibility of more time    His saying \"I want to die,\" is not a reflection of an actual desire nor an inclination towards comfort care/hospice.  Due to his stroke and ongoing grief due to serious illness (severely limited physically after being a very active person), there is both mood dysregulation and severe anxiety related to the hospital environment.  He explains that this phrase has underlying meaning of him feeling guilt related to his illness, as well as a desire to be home and away from the hospital.  Although family gives him strength, he has a strong fear of death.                Code Status: Full - Level 1               Decisional apparatus:  Patient is marginally competent on my exam today.  If competence is lost, patient's substitute decision maker would default to spouse by [place] Act 169.               Advance Directive / Living Will / POLST:  None on file  Palliative care by specialist  Palliative diagnosis: ESRD, chronic combined CHF  PPS: 50-60%    Education/counseling provided on role/purpose of palliative care; benefits/risks of treatment options by our team reviewed; instructions for management and anticipatory guidance provided; supportive listening and presence provided; provided space for life review and whole person assessment    Psychosocial/Spiritual:   -Supported by spouse Eliana, and 4 adult children (Miguel A, Karen, Sam, Adamaris)  - Retired now, but former  40 years, spent a lot of time commuting to [place]  - Enjoys TV (3 stooges, " "family feud), spending time with family/grandchildren, car rides, [place] music, poker, and food (meat liver)  - Strong [place], very close to  and community ( [facility])    Communicated and coordinated with Palliative LSW    Follow-up planning: Would benefit from outpatient follow-up in our office.  Will coordinate.  Mood disorder (HCC)  From CVA.  An appropriate affect and emotional dysregulation related to hospital environment and certain topics such as \"serious disease,\" cancer,\" death/dying\", etc. follows with psychiatry in and out of network institution, who planned for antipsychotic therapy with Depakote and Seroquel.  However, they are holding off on these medications until aortic valve and heart disease can be addressed  History of stroke in adulthood  With mood dysregulation and limitations  Anemia  Anemia of ESRD  ESRD on dialysis (HCC)  Lab Results   Component Value Date    EGFR 11 05/20/2025    EGFR 8 05/19/2025    EGFR 13 05/18/2025    CREATININE 4.86 (H) 05/20/2025    CREATININE 6.11 (H) 05/19/2025    CREATININE 4.34 (H) 05/18/2025     They are working with out of [facility] institution trying to get on transplant list  CAD, multiple vessel  S/p stents and on antiplatelet/anticoagulation therapy  Ischemic cardiomyopathy    Absence of toe of right foot (HCC)    Chronic systolic heart failure (HCC)  Wt Readings from Last 3 Encounters:   05/20/25 93 kg (205 lb 0.4 oz)   05/02/25 94.3 kg (208 lb)   05/01/25 94.8 kg (209 lb)         Echocardiogram 5/18/2025:    Left Ventricle: Left ventricular cavity size is mildly dilated. LVIDd is 5.90 cm. Wall thickness is mildly increased. There is mild to moderate concentric hypertrophy. The left ventricular ejection fraction is 25%. Systolic function is severely reduced. Wall motion is normal.    Right Ventricle: Right ventricular cavity size is moderately dilated.    Left Atrium: The atrium is severely dilated (42-48 mL/m2).    Right " Atrium: The atrium is moderately dilated.    PAD (peripheral artery disease) ([facility])    Chronic deep vein thrombosis (DVT) of distal vein of right lower extremity ([facility])    Anemia due to chronic kidney disease, on chronic dialysis ([facility]ly [facility])  Lab Results   Component Value Date    EGFR 11 05/20/2025    EGFR 8 05/19/2025    EGFR 13 05/18/2025    CREATININE 4.86 (H) 05/20/2025    CREATININE 6.11 (H) 05/19/2025    CREATININE 4.34 (H) 05/18/2025     Hypotension    Cirrhosis ([facility])  Compensated but imaging shows morphology  Sepsis ([facility])    Pneumonia    Nonrheumatic aortic valve stenosis  Echocardiogram 5/18/2025    Aortic Valve: The leaflets are moderately thickened. The leaflets are severely calcified. There is severely reduced mobility. There is mild to moderate regurgitation. There is at least moderate stenosis. The aortic valve mean gradient is 24 mmHg. The dimensionless velocity index is 0.25. The aortic valve area is 1.44 cm2.     They are working with out of network cardiology and CT surgery for repair  Encounter for dialysis ([facility])      NARRATIVE AND INTERVAL HISTORY:       Patient seen and examined in this hospital chair.  Spouse present.  States that he is feeling better compared to yesterday.  Now off oxygen and breathing comfortably.  No pain or discomfort.  No nausea or vomiting.  Eating well.  Some constipation however.    MEDICATIONS / ALLERGIES:     all current active meds have been reviewed, current meds:   Current Facility-Administered Medications:     acetaminophen (TYLENOL) oral suspension 650 mg, Q6H PRN    apixaban (ELIQUIS) tablet 2.5 mg, BID    aspirin chewable tablet 81 mg, Daily    atorvastatin (LIPITOR) tablet 40 mg, Daily With Dinner    cefepime (MAXIPIME) 1,000 mg in dextrose 5 % 50 mL IVPB, Q24H, Last Rate: 1,000 mg (05/19/25 9379)    chlorhexidine (PERIDEX) 0.12 % oral rinse 15 mL, Q12H MICH    cyanocobalamin (VITAMIN B-12) tablet 250 mcg, Daily    divalproex sodium (DEPAKOTE SPRINKLE) capsule 250 mg,  Q12H MICH    doxycycline hyclate (VIBRAMYCIN) capsule 100 mg, Q12H MICH    [Held by provider] metoprolol succinate (TOPROL-XL) 24 hr tablet 25 mg, BID    midodrine (PROAMATINE) tablet 10 mg, Before Dialysis    midodrine (PROAMATINE) tablet 15 mg, TID AC    NOREPINEPHRINE 4 MG  ML NSS (CMPD ORDER) infusion, Titrated, Last Rate: 4 mcg/min (05/20/25 0520)    pantoprazole (PROTONIX) injection 40 mg, Q12H MICH    prasugrel (EFFIENT) tablet 10 mg, Daily, and PTA meds:   Prior to Admission Medications   Prescriptions Last Dose Informant Patient Reported? Taking?   Sevelamer Carbonate 2.4 g  Family Member Yes No   Sig: Take 1 packet by mouth Three times a day   Vitamin D3 1.25 MG (41441 UT) capsule  Family Member No No   Sig: TAKE 1 CAPSULE BY MOUTH ONE TIME PER WEEK   apixaban (Eliquis) 2.5 mg  Family Member No No   Sig: Take 1 tablet (2.5 mg total) by mouth 2 (two) times a day   aspirin 81 mg chewable tablet  Family Member Yes No   Sig: Chew 1 tablet daily   atorvastatin (LIPITOR) 40 mg tablet  Family Member No No   Sig: Take 1 tablet (40 mg total) by mouth daily with dinner   metoprolol succinate (TOPROL-XL) 25 mg 24 hr tablet  Family Member No No   Sig: TAKE 1 TABLET BY MOUTH TWICE A DAY   midodrine (PROAMATINE) 10 MG tablet  Family Member No No   Sig: Take 1 tablet (10 mg total) by mouth Before Dialysis (before dialysis)   prasugrel (EFFIENT) tablet  Family Member No No   Sig: Take 1 tablet (10 mg total) by mouth daily   sacubitril-valsartan (ENTRESTO) 24-26 MG TABS  Family Member Yes No   Sig: Take 1 tablet by mouth daily after dinner      Facility-Administered Medications: None       Allergies[1]    OBJECTIVE:    Physical Exam  Physical Exam  Constitutional:       General: He is not in acute distress.     Appearance: He is ill-appearing.   HENT:      Head: Normocephalic and atraumatic.      Right Ear: External ear normal.      Left Ear: External ear normal.      Nose: Nose normal.      Mouth/Throat:      Mouth:  Mucous membranes are moist.      Pharynx: Oropharynx is clear.     Eyes:      General: No scleral icterus.     Conjunctiva/sclera: Conjunctivae normal.       Cardiovascular:      Rate and Rhythm: Normal rate and regular rhythm.      Pulses: Decreased pulses.           Radial pulses are 1+ on the right side and 1+ on the left side.   Pulmonary:      Effort: Pulmonary effort is normal. No respiratory distress.   Abdominal:      General: There is no distension.      Palpations: Abdomen is soft.      Tenderness: There is no abdominal tenderness.     Musculoskeletal:      Cervical back: No rigidity or tenderness.      Right lower leg: No edema.      Left lower leg: No edema.   Lymphadenopathy:      Cervical: No cervical adenopathy.     Skin:     General: Skin is dry.      Coloration: Skin is pale. Skin is not jaundiced.     Neurological:      General: No focal deficit present.      Mental Status: Mental status is at baseline.     Psychiatric:         Attention and Perception: Attention normal.         Mood and Affect: Affect is labile.         Speech: Speech normal.         Behavior: Behavior normal. Behavior is cooperative.         Thought Content: Thought content normal.         Cognition and Memory: Cognition is impaired. Memory is not impaired.         Lab Results: I have personally reviewed pertinent labs., CBC:   Lab Results   Component Value Date    WBC 9.05 05/20/2025    HGB 8.6 (L) 05/20/2025    HCT 27.3 (L) 05/20/2025     (H) 05/20/2025     (L) 05/20/2025    RBC 2.74 (L) 05/20/2025    MCH 31.4 05/20/2025    MCHC 31.5 05/20/2025    RDW 16.9 (H) 05/20/2025    MPV 11.0 05/20/2025   , CMP:   Lab Results   Component Value Date    SODIUM 134 (L) 05/20/2025    K 4.2 05/20/2025    CL 95 (L) 05/20/2025    CO2 30 05/20/2025    BUN 26 (H) 05/20/2025    CREATININE 4.86 (H) 05/20/2025    CALCIUM 8.2 (L) 05/20/2025    AST 8 (L) 05/20/2025    ALT 6 (L) 05/20/2025    ALKPHOS 112 (H) 05/20/2025    EGFR 11  "05/20/2025   , BMP:  Lab Results   Component Value Date    SODIUM 134 (L) 05/20/2025    K 4.2 05/20/2025    CL 95 (L) 05/20/2025    CO2 30 05/20/2025    BUN 26 (H) 05/20/2025    CREATININE 4.86 (H) 05/20/2025    GLUC 123 05/20/2025    CALCIUM 8.2 (L) 05/20/2025    AGAP 9 05/20/2025    EGFR 11 05/20/2025   , PT/PTT:No results found for: \"PT\", \"PTT\"  Imaging Studies: Reviewed and interpreted the pertinent studies  EKG, Pathology, and Other Studies: Reviewed and interpreted the pertinent studies    I have spent a total time of 35 minutes in caring for this patient on the day of the visit/encounter including Instructions for management, Patient and family education, Impressions, Counseling / Coordination of care, Documenting in the medical record, Reviewing/placing orders in the medical record (including tests, medications, and/or procedures), Obtaining or reviewing history  , and Communicating with other healthcare professionals .         [1] No Known Allergies    "

## 2025-05-20 NOTE — ASSESSMENT & PLAN NOTE
Noncompliant with fluid restriction as an outpatient.  Patient has underlying cardiomyopathy and severe aortic stenosis.  Blood pressure currently stable slightly above dry weight will aim for 2 L ultrafiltration as blood pressure tolerates  Tolerated dialysis well yesterday  Continue midodrine 15 mg 3 times daily

## 2025-05-20 NOTE — ASSESSMENT & PLAN NOTE
CT remarkable for interstitial pneumonia 5/17    Fever, elevated heart rate, tachypnea.  CT concerning for interstitial pneumonia.   Recent Labs     05/17/25  0334 05/17/25  0431   LACTICACID 3.1* 2.8*     On cefepime 1 g every 24 hours and doxycycline  Collect urine Legionella, strep, UA if possible

## 2025-05-20 NOTE — PROGRESS NOTES
Progress Note - Nephrology   Name: Asad Galloway 65 y.o. male I MRN: [id number]  Unit/Bed#: ICU 03 I Date of Admission: 5/17/2025   Date of Service: 5/20/2025 I Hospital Day: 3     Assessment & Plan  ESRD on dialysis (Former[facility])  Lab Results   Component Value Date    EGFR 11 05/20/2025    EGFR 8 05/19/2025    EGFR 13 05/18/2025    CREATININE 4.86 (H) 05/20/2025    CREATININE 6.11 (H) 05/19/2025    CREATININE 4.34 (H) 05/18/2025   #ESRD on HD MWF[place]  [place] [facility] [place]  EDW 95 kg, unable to achieve dry weight as per family because he drinks too much water.  Fluid restriction less than 1.5 L/day  Underwent hemodialysis yesterday, able to ultrafiltrate 2 L  Blood pressures currently stable saturating 92% on room air  Discussed with wife at bedside.  Patient clinically feeling better  Plan for next hemodialysis tomorrow May 21st  Anemia  Hemoglobin stable at 8.6  Patient on Mircera as an outpatient  Hypotension  Noncompliant with fluid restriction as an outpatient.  Patient has underlying cardiomyopathy and severe aortic stenosis.  Blood pressure currently stable slightly above dry weight will aim for 2 L ultrafiltration as blood pressure tolerates  Tolerated dialysis well yesterday  Continue midodrine 15 mg 3 times daily  History of stroke in adulthood    Mood disorder (HCC)    Absence of toe of right foot (HCC)    Chronic deep vein thrombosis (DVT) of distal vein of right lower extremity (HCC)    Anemia due to chronic kidney disease, on chronic dialysis ([facility])  Lab Results   Component Value Date    EGFR 11 05/20/2025    EGFR 8 05/19/2025    EGFR 13 05/18/2025    CREATININE 4.86 (H) 05/20/2025    CREATININE 6.11 (H) 05/19/2025    CREATININE 4.34 (H) 05/18/2025   -- Mircera as an outpatient hemoglobin stable  Cirrhosis (HCC)    Sepsis (HCC)  -- Management as per critical care  Pneumonia  -- Currently on IV cefepime  Shock ([facility])  -- Low-dose Levophed for ultrafiltration on dialysis on broad-spectrum IV  antibiotics  Goals of care, counseling/discussion  -- Ongoing  Nonrheumatic aortic valve stenosis  -- Patient plan for surgery at [facility] in approximately 1 week  Encounter for dialysis ([facility])  -- Patient seen and examined on hemodialysis currently tolerating procedure well  -- Saturating 100% on room air.  Blood pressures currently stable on a low-dose of Levophed  -- Dialysis settings discussed with HD nurse.  3.5 hours, F1 80 dialyzer, blood flow rate 400 mL/min, dialysate flow rate 600 mL/min, aiming for 2 L ultrafiltration as blood pressure tolerates slightly above dry weight, decreased temperature to 35 degrees, 138 sodium, 2K bath  -- Discussed with the HD nurse and wife  Palliative care by specialist      I have reviewed the nephrology recommendations including assessment and plan, with patient and wife, and we are in agreement with renal plan including the information outlined above. Nephrology service will follow.    Subjective   Brief History of Admission -  65 y.o. man  with PMH of ESRD on HD MWF  via AV fistula, anemia, hypertension, CHF, ischemic cardiomyopathy present unresponsive. Nephrolgy is consulted for management of ESRD     No complaints this morning.  Currently normotensive.  Saturating well.  Underwent dialysis yesterday.    Objective :  Temp:  [98 °F (36.7 °C)-98.8 °F (37.1 °C)] 98.7 °F (37.1 °C)  HR:  [66-92] 74  BP: ()/(37-75) 114/58  Resp:  [9-31] 16  SpO2:  [87 %-100 %] 92 %  O2 Device: None (Room air)  Nasal Cannula O2 Flow Rate (L/min):  [3 L/min] 3 L/min    Current Weight: Weight - Scale: 93 kg (205 lb 0.4 oz)  First Weight: Weight - Scale: 107 kg (235 lb 7.2 oz)  I/O         05/18 0701  05/19 0700 05/19 0701  05/20 0700 05/20 0701  05/21 0700    P.O. 100 942     I.V. (mL/kg) 814 (8.5) 886.1 (9.5)     NG/      IV Piggyback 355 150     Total Intake(mL/kg) 1369 (14.4) 1978.1 (21.3)     Emesis/NG output       Other 2300 2500     Stool       Total Output 2300 2500      Net -931.1 -521.9                  Physical Exam  Vitals and nursing note reviewed. Exam conducted with a chaperone present.   Constitutional:       General: He is not in acute distress.     Appearance: He is well-developed. He is obese. He is not diaphoretic.   HENT:      Head: Normocephalic and atraumatic.     Eyes:      General: No scleral icterus.     Pupils: Pupils are equal, round, and reactive to light.       Cardiovascular:      Rate and Rhythm: Normal rate and regular rhythm.      Heart sounds: Normal heart sounds. No murmur heard.     No friction rub. No gallop.   Pulmonary:      Effort: Pulmonary effort is normal. No respiratory distress.      Breath sounds: Normal breath sounds. No wheezing or rales.   Chest:      Chest wall: No tenderness.   Abdominal:      General: Bowel sounds are normal. There is no distension.      Palpations: Abdomen is soft.      Tenderness: There is no abdominal tenderness. There is no rebound.     Musculoskeletal:         General: Normal range of motion.      Cervical back: Normal range of motion and neck supple.      Right lower leg: Edema present.      Left lower leg: Edema present.     Skin:     Findings: No rash.     Neurological:      Mental Status: He is alert and oriented to person, place, and time.         Medications:  Current Medications[1]      Lab Results: I have reviewed the following results:  Results from last 7 days   Lab Units 05/20/25  0524 05/19/25  0321 05/18/25  1139 05/18/25  0432 05/18/25  0024 05/17/25  1850 05/17/25  1814 05/17/25  1700 05/17/25  0828 05/17/25  0502 05/17/25  0334 05/17/25  0305 05/14/25  1137   WBC Thousand/uL 9.05 9.33  --  6.72  --   --   --   --   --  6.11 4.96  --  5.29   HEMOGLOBIN g/dL 8.6* 8.7* 9.5* 8.5* 8.6* 9.5* 8.8*   < >  --  9.8* 9.7*  --  9.7*   I STAT HEMOGLOBIN g/dl  --   --   --   --   --   --   --   --   --   --   --  11.2*  --    HEMATOCRIT % 27.3* 27.9* 29.6* 27.3* 27.2* 30.2* 29.2*   < >  --  31.0* 31.1*  --   "31.5*   HEMATOCRIT, ISTAT %  --   --   --   --   --   --   --   --   --   --   --  33*  --    PLATELETS Thousands/uL 119* 104*  --  77*  --   --   --   --   --  95* 91*  --  99*   POTASSIUM mmol/L 4.2 4.6  --  4.6  --   --   --   --  3.8 5.2 5.0  --   --    CHLORIDE mmol/L 95* 93*  --  94*  --   --   --   --  100 93* 92*  --   --    CO2 mmol/L 30 29  --  31  --   --   --   --  27 29 28  --   --    CO2, I-STAT mmol/L  --   --   --   --   --   --   --   --   --   --   --  27  --    BUN mg/dL 26* 36*  --  24  --   --   --   --  30* 32* 32*  --   --    CREATININE mg/dL 4.86* 6.11*  --  4.34*  --   --   --   --  4.31* 4.94* 5.02*  --   --    CALCIUM mg/dL 8.2* 8.1*  --  7.9*  --   --   --   --  6.5* 7.7* 7.8*  --   --    MAGNESIUM mg/dL 2.0  --   --  2.1  --   --   --   --  1.7* 2.1 2.1  --   --    PHOSPHORUS mg/dL 3.8 4.8*  --  3.8  --   --   --   --  4.4* 5.5*  --   --   --    ALBUMIN g/dL 3.8  --   --   --   --   --   --   --   --  3.8 3.8  --   --    GLUCOSE, ISTAT mg/dl  --   --   --   --   --   --   --   --   --   --   --  152*  --     < > = values in this interval not displayed.       Administrative Statements   I have spent a total time of 18 minutes in caring for this patient on the day of the visit/encounter including Counseling / Coordination of care, Documenting in the medical record, Reviewing/placing orders in the medical record (including tests, medications, and/or procedures), and Obtaining or reviewing history  .  Portions of the record may have been created with voice recognition software. Occasional wrong word or \"sound a like\" substitutions may have occurred due to the inherent limitations of voice recognition software. Read the chart carefully and recognize, using context, where substitutions have occurred.If you have any questions, please contact the dictating provider.       [1]   Current Facility-Administered Medications:     acetaminophen (TYLENOL) oral suspension 650 mg, 650 mg, Oral, Q6H PRN, " BRITTANY Luna, 650 mg at 05/18/25 0645    albumin human (FLEXBUMIN) 5 % injection 25 g, 25 g, Intravenous, Once, Almita Rodriguez MD    apixaban (ELIQUIS) tablet 2.5 mg, 2.5 mg, Oral, BID, Belén Calle MD, 2.5 mg at 05/20/25 0850    aspirin chewable tablet 81 mg, 81 mg, Oral, Daily, Belén Calle MD, 81 mg at 05/20/25 0849    atorvastatin (LIPITOR) tablet 40 mg, 40 mg, Oral, Daily With Dinner, Aniyah Guerrero DO, 40 mg at 05/19/25 1731    cefepime (MAXIPIME) 1,000 mg in dextrose 5 % 50 mL IVPB, 1,000 mg, Intravenous, Q24H, Almita Rodriguez MD, Last Rate: 100 mL/hr at 05/19/25 1731, 1,000 mg at 05/19/25 1731    chlorhexidine (PERIDEX) 0.12 % oral rinse 15 mL, 15 mL, Mouth/Throat, Q12H MICH, Willard Rodriguez MD, 15 mL at 05/20/25 0849    cyanocobalamin (VITAMIN B-12) tablet 250 mcg, 250 mcg, Oral, Daily, Belén Calle MD, 250 mcg at 05/20/25 0850    divalproex sodium (DEPAKOTE SPRINKLE) capsule 250 mg, 250 mg, Oral, Q12H MICH, Almita Rodriguez MD, 250 mg at 05/20/25 0850    doxycycline hyclate (VIBRAMYCIN) capsule 100 mg, 100 mg, Oral, Q12H MICH, Almita Rodriguez MD, 100 mg at 05/20/25 0849    [Held by provider] metoprolol succinate (TOPROL-XL) 24 hr tablet 25 mg, 25 mg, Oral, BID, Aniyah Guerrero DO    midodrine (PROAMATINE) tablet 10 mg, 10 mg, Oral, Before Dialysis, Joselyn Reyes Bahamonde, MD, 10 mg at 05/19/25 0834    midodrine (PROAMATINE) tablet 15 mg, 15 mg, Oral, TID AC, Almita Rodriguez MD, 15 mg at 05/20/25 0849    NOREPINEPHRINE 4 MG  ML NSS (CMPD ORDER) infusion, 1-30 mcg/min, Intravenous, Titrated, Chanel Eller MD, Last Rate: 15 mL/hr at 05/20/25 0520, 4 mcg/min at 05/20/25 0520    prasugrel (EFFIENT) tablet 10 mg, 10 mg, Oral, Daily, Belén Calle MD, 10 mg at 05/20/25 0919

## 2025-05-20 NOTE — ASSESSMENT & PLAN NOTE
Lab Results   Component Value Date    EGFR 11 05/20/2025    EGFR 8 05/19/2025    EGFR 13 05/18/2025    CREATININE 4.86 (H) 05/20/2025    CREATININE 6.11 (H) 05/19/2025    CREATININE 4.34 (H) 05/18/2025     They are working with out of network institution trying to get on transplant list

## 2025-05-20 NOTE — ASSESSMENT & PLAN NOTE
Most recent Hgb 10.0/Hct 32.9  Continue to monitor CBC periodically  Monitor for signs and symptoms of bleeding  Continue to monitor patient for acute changes in condition  Follow up with PCP

## 2025-05-20 NOTE — PROGRESS NOTES
Progress Note - Cardiology   Name: Asad Galloway 65 y.o. male I MRN: [id number]  Unit/Bed#: ICU 03 I Date of Admission: 5/17/2025   Date of Service: 5/20/2025 I Hospital Day: 3     Assessment & Plan  ESRD on dialysis ([facility])  Lab Results   Component Value Date    EGFR 11 05/20/2025    EGFR 8 05/19/2025    EGFR 13 05/18/2025    CREATININE 4.86 (H) 05/20/2025    CREATININE 6.11 (H) 05/19/2025    CREATININE 4.34 (H) 05/18/2025     Hypotension  Suspected that his presenting episode of unresponsiveness and low BP due to low blood pressure post dialysis and fluid removal, in the setting of severe AS and cardiomyopathy, sepsis   PTA - takes midodrine prior to HD  -Remains on norepi gtt and wean as able, also scheduled midodrine 15 mg TID  -Will plan to avoid Entresto resumption unless clinical improvement in which he no longer requires midodrine  -Can readdress BB as part of GDMT once off pressors  CAD, multiple vessel  Recent PCI in March 2025  -Continue aspirin, prasugrel  Ischemic cardiomyopathy  LVEF 25-30%  LifeVest without arrhythmia or therapies delivered  -Metoprolol and Entresto held due to above    Nonrheumatic aortic valve stenosis  Moderate to severe AS  Ongoing TAVR work up at [facility] [place] Encounter for dialysis ([facility])      Subjective   Interval history: Patient seen & examined. No acute events overnight.    Objective :  Temp:  [98 °F (36.7 °C)-98.8 °F (37.1 °C)] 98.2 °F (36.8 °C)  HR:  [66-92] 83  BP: ()/(37-75) 101/69  Resp:  [9-31] 18  SpO2:  [87 %-100 %] 96 %  O2 Device: None (Room air)  Nasal Cannula O2 Flow Rate (L/min):  [3 L/min] 3 L/min  Orthostatic Blood Pressures      Flowsheet Row Most Recent Value   Blood Pressure 101/69 filed at 05/20/2025 1100   Patient Position - Orthostatic VS Sitting filed at 05/20/2025 1100          First Weight: Weight - Scale: 107 kg (235 lb 7.2 oz) (05/17/25 0302)  Vitals:    05/19/25 1230 05/20/25 0525   Weight: 93.2 kg (205 lb 7.5 oz) 93 kg (205 lb 0.4  oz)     Physical Exam  Vitals reviewed.   Constitutional:       General: He is not in acute distress.     Appearance: He is well-developed. He is not toxic-appearing.     Cardiovascular:      Heart sounds: Murmur heard.      No gallop.   Pulmonary:      Effort: Pulmonary effort is normal. No respiratory distress.      Breath sounds: Normal breath sounds. No wheezing or rales.   Abdominal:      General: Bowel sounds are normal. There is no distension.      Palpations: Abdomen is soft.      Tenderness: There is no abdominal tenderness.     Musculoskeletal:      Right lower leg: No edema.      Left lower leg: No edema.     Skin:     General: Skin is warm.      Coloration: Skin is not jaundiced.     Neurological:      Mental Status: He is alert.           Lab Results: I have reviewed the following results:CBC/BMP:   .     05/20/25 0524   WBC 9.05   HGB 8.6*   HCT 27.3*   *   SODIUM 134*   K 4.2   CL 95*   CO2 30   BUN 26*   CREATININE 4.86*   GLUC 123   CAIONIZED 1.00*   MG 2.0   PHOS 3.8      Results from last 7 days   Lab Units 05/20/25  0524 05/19/25  0321 05/18/25  1139 05/18/25  0432   WBC Thousand/uL 9.05 9.33  --  6.72   HEMOGLOBIN g/dL 8.6* 8.7* 9.5* 8.5*   HEMATOCRIT % 27.3* 27.9* 29.6* 27.3*   PLATELETS Thousands/uL 119* 104*  --  77*     Results from last 7 days   Lab Units 05/20/25  0524 05/19/25  0321 05/18/25  0432 05/17/25  0334 05/17/25  0305   POTASSIUM mmol/L 4.2 4.6 4.6   < >  --    CHLORIDE mmol/L 95* 93* 94*   < >  --    CO2 mmol/L 30 29 31   < >  --    CO2, I-STAT mmol/L  --   --   --   --  27   BUN mg/dL 26* 36* 24   < >  --    CREATININE mg/dL 4.86* 6.11* 4.34*   < >  --    GLUCOSE, ISTAT mg/dl  --   --   --   --  152*   CALCIUM mg/dL 8.2* 8.1* 7.9*   < >  --     < > = values in this interval not displayed.     Results from last 7 days   Lab Units 05/20/25  0524 05/19/25  0542 05/18/25  2135 05/18/25  1523 05/17/25  1850 05/17/25  1029 05/17/25  0334   INR  1.27*  --   --   --   --  1.58*  1.47*   PTT seconds  --  149* >210* 53*   < > 116* 34    < > = values in this interval not displayed.     Lab Results   Component Value Date    HGBA1C 5.4 04/10/2025     Lab Results   Component Value Date    CKTOTAL 188 11/12/2022    CKMB 4.0 11/12/2022    CKMBINDEX 2.1 11/12/2022    TROPONINI <0.02 09/08/2021

## 2025-05-20 NOTE — ASSESSMENT & PLAN NOTE
Patient requiring norepinephrine to maintain maps above 65.  Borderline BP.  Low blood blood pressure postdialysis and fluid removal that day in the setting of severe AS and cardiomyopathy.  Also consideration of sepsis/infection, suspected pneumonia on CT.  Sepsis versus some component of cardiogenic shock    Plan:  Wean levo as tolerated to maintain MAP greater than 65  Treatment for pneumonia see plan under sepsis.  Holding home metoprolol and Entresto in setting of hypotension  Continue midodrine 15 mg 3 times daily  Albumin 1 g today

## 2025-05-20 NOTE — PROGRESS NOTES
Progress Note - Critical Care/ICU   Name: Asad Galloway 65 y.o. male I MRN: [id number]  Unit/Bed#: ICU 03 I Date of Admission: 5/17/2025   Date of Service: 5/20/2025 I Hospital Day: 3      Assessment & Plan  Shock (HCC)  Hypotension  Patient requiring norepinephrine to maintain maps above 65.  Borderline BP.  Low blood blood pressure postdialysis and fluid removal that day in the setting of severe AS and cardiomyopathy.  Also consideration of sepsis/infection, suspected pneumonia on CT.  Sepsis versus some component of cardiogenic shock    Plan:  Wean levo as tolerated to maintain MAP greater than 65  Treatment for pneumonia see plan under sepsis.  Holding home metoprolol and Entresto in setting of hypotension  Continue midodrine 15 mg 3 times daily  Albumin 1 g today  ESRD on dialysis ([facility])  Anemia due to chronic kidney disease, on chronic dialysis ([facility])  Anemia  Lab Results   Component Value Date    EGFR 11 05/20/2025    EGFR 8 05/19/2025    EGFR 13 05/18/2025    CREATININE 4.86 (H) 05/20/2025    CREATININE 6.11 (H) 05/19/2025    CREATININE 4.34 (H) 05/18/2025     Hx of HD 3x per week.  Access, left AV fistula.  Nephro following    -Trend renal functioning  -Plan for HD if BP stabilizes  Renal Diet  Fluid restriction 1-1.5L/d  Adjust medications to GFR<10  Avoid opioids   Cirrhotic liver morphology on CT  Monitor CBC  Epogen contraindicated in setting of stroke  Pneumonia  Sepsis (HCC)  CT remarkable for interstitial pneumonia 5/17    Fever, elevated heart rate, tachypnea.  CT concerning for interstitial pneumonia.   Recent Labs     05/17/25  0334 05/17/25  0431   LACTICACID 3.1* 2.8*     On cefepime 1 g every 24 hours and doxycycline  Collect urine Legionella, strep, UA if possible  Chronic systolic heart failure (HCC)  Ischemic cardiomyopathy  Wt Readings from Last 3 Encounters:   05/20/25 93 kg (205 lb 0.4 oz)   05/02/25 94.3 kg (208 lb)   05/01/25 94.8 kg (209 lb)     Metoprolol, entresto on hold due to low  blood pressure    CHF with acute exacerbation, LVEF 30 to 35%.  Acute volume overload due to poor HD sessions.  Up 10K from dry weight.  Aortic stenosis, moderate to severe, ongoing TAVR workup [facility]   Echo 5/18: LVEF 25%. Systolic function is severely reduced.  Moderate/severe AAS, mild/moderate TR    Plan:  Resume GDMT when blood pressures allow  Cardiology following  Cirrhosis (HCC)  Cirrhotic liver morphology on CT currently compensated  CAD, multiple vessel  3/17/25 ([facility] [place]) - [facility] 90% pLAD stenosis, severe plaque burden, in-stent restenosis with areas of underexpansion and calcified nodules in vision between overlap of stents --> POBA with drug-coated balloon for in-stent restenosis, no new stent  Status post PCI to LAD  Continue aspirin, prasugrel with recent PCI  Note: He is noted to be Plavix nonresponder at OSH with history of in-stent restenosis  Chronic deep vein thrombosis (DVT) of distal vein of right lower extremity ([facility])  Prescribed Eliquis, heparin drip while inpatient  History of stroke in adulthood  Thrombosis of left MCA in 2018  No residual symptoms, except cognitive impairment (per notes)  Poor historian  Continue aspirin  Absence of toe of right foot ([facility])  Chronic wounds, care consulted  Mood disorder ([facility])  Unspecified mood disorder, patient previously on Depakote.  Was discontinued during his previous admission in [facility] due to concerns for thrombocytopenia.  Goals of care, counseling/discussion  Patient and family amenable to goals of care discussion, palliative care consulted.    Will follow-up with palliative care outpatient.  Nonrheumatic aortic valve stenosis  Moderate to severe AS  Ongoing TAVR work up at [facility]  Encounter for dialysis ([facility])    Palliative care by specialist    Disposition: Critical care    ICU Core Measures     A: Assess, Prevent, and Manage Pain Has pain been assessed? Yes  Need for changes to pain regimen?  No   B: Both SAT/SAT  N/A   C: Choice of Sedation RASS Goal: 0 Alert and Calm  Need for changes to sedation or analgesia regimen? No   D: Delirium CAM-ICU: Negative   E: Early Mobility  Plan for early mobility? Yes   F: Family Engagement Plan for family engagement today? Yes       Antibiotic Review: Awaiting culture results.  and Continue broad spectrum secondary to severity of illness.       Prophylaxis:  VTE VTE covered by:  apixaban, Oral, 2.5 mg at 05/19/25 1731       Stress Ulcer  covered bypantoprazole (PROTONIX) 40 mg p.o.         Hospital day 3, ICU day 3, extubated 2 days ago.    24 Hour Events : 65M hx CHF, ESRD, DM2, CAD, cirrhosis presenting 5/17 with AMS and found down requiring intubation. Brought from home for sudden onset unresponsiveness at 1am yesterday, hypothermic, mental status did not improve with narcan so he was intubated. Likely volume overloaded in the setting of inadequate dialysis/CHF exacerbation. had been getting on 4L total off all week instead of usually 12-15L  Dialysis 5/17, 5/18.  Extubated 5/18.   Patient is currently on antibiotics for possible atypical pneumonia. +coughing    Patient had 2.5 L taken off at dialysis yesterday, patient not making enough urine to collect Legionella, UA, strep pneumo.    Subjective   Patient is saying no to all orientation questions but denied having any pain, wife at bedside stating he was feeling upset.  Confirmed level 1 CODE STATUS.    Objective :                   Vitals I/O      Most Recent Min/Max in 24hrs   Temp 98.7 °F (37.1 °C) Temp  Min: 98 °F (36.7 °C)  Max: 98.8 °F (37.1 °C)   Pulse 74 Pulse  Min: 66  Max: 92   Resp 16 Resp  Min: 9  Max: 31   /58 BP  Min: 77/37  Max: 130/52   O2 Sat 92 % SpO2  Min: 87 %  Max: 100 %   On room air   Intake/Output Summary (Last 24 hours) at 5/20/2025 0821  Last data filed at 5/20/2025 0600  Gross per 24 hour   Intake 1676.06 ml   Output 2500 ml   Net -823.94 ml       Diet Renal; Potassium 2 GM; Yes;  Fluid Restriction 1500 ML; No; Lo Phosphorus    Invasive Monitoring   Arterial Line  Fonda BP    No data recorded   MAP    No data recorded           Physical Exam   Physical Exam  Skin:     Coloration: Skin is pale.      Findings: Lesion and wound present.      Comments: Left AV fistula, right pressure bandage present, adequate tamponade of bleeding.   HENT:      Nose: No congestion or rhinorrhea.   Cardiovascular:      Rate and Rhythm: Normal rate and regular rhythm.   Musculoskeletal:      Right lower leg: No edema.      Left lower leg: No edema.   Abdominal: General: There is distension.     Palpations: Abdomen is soft.      Tenderness: There is no abdominal tenderness.   Constitutional:       General: He is not in acute distress.  Pulmonary:      Effort: No respiratory distress.      Breath sounds: No wheezing or rhonchi.   Neurological:      General: No focal deficit present.      Mental Status: He is alert. Mental status is at baseline.          Diagnostic Studies        Lab Results: I have reviewed the following results:     Medications:  Scheduled PRN   apixaban, 2.5 mg, BID  aspirin, 81 mg, Daily  atorvastatin, 40 mg, Daily With Dinner  cefepime, 1,000 mg, Q24H  chlorhexidine, 15 mL, Q12H MICH  cyanocobalamin, 250 mcg, Daily  divalproex sodium, 250 mg, Q12H MICH  doxycycline hyclate, 100 mg, Q12H MICH  [Held by provider] metoprolol succinate, 25 mg, BID  midodrine, 15 mg, TID AC  pantoprazole, 40 mg, Early Morning  prasugrel, 10 mg, Daily      acetaminophen, 650 mg, Q6H PRN  midodrine, 10 mg, Before Dialysis       Continuous    norepinephrine, 1-30 mcg/min, Last Rate: 4 mcg/min (05/20/25 0520)         Labs:   CBC    Recent Labs     05/19/25 0321 05/20/25  0524   WBC 9.33 9.05   HGB 8.7* 8.6*   HCT 27.9* 27.3*   * 119*     BMP    Recent Labs     05/19/25 0321 05/20/25 0524   SODIUM 134* 134*   K 4.6 4.2   CL 93* 95*   CO2 29 30   AGAP 12 9   BUN 36* 26*   CREATININE 6.11* 4.86*   CALCIUM 8.1* 8.2*        Coags    Recent Labs     05/18/25  2135 05/19/25  0542 05/20/25  0524   INR  --   --  1.27*   PTT >210* 149*  --         Additional Electrolytes  Recent Labs     05/19/25 0321 05/20/25 0524   MG  --  2.0   PHOS 4.8* 3.8   CAIONIZED 0.96* 1.00*          Lab Results   Component Value Date    IRON 49 (L) 05/19/2025    FERRITIN 924 (H) 05/19/2025    TIBC 187.6 (L) 05/19/2025    CONCFE 26 05/19/2025     LFTs  Recent Labs     05/20/25 0524   ALT 6*   AST 8*   ALKPHOS 112*   ALB 3.8   TBILI 1.08*       Infectious  No recent results   Blood culture negative at 72 Glucose  Recent Labs     05/19/25 0321 05/20/25 0524   GLUC 97 123      Neuro:  Awake and close to baseline a few hours later in the ICU. Potentially toxic metabolic encephalopathy from incomplete HD sessions (though normal uremia) vs hypotension  No cardiac event on life vest data   No known seizures.  Plan to follow-up with outpatient palliative care    CV: CHF with acute exacerbation   Shock - likely septic, possible cardiogenic component. Baseline EF 20%  Acute volume overload due to poor HD sessions  Up 10K from dry weight  No events on life vest interrogation     - Continue volume removal with HD  - baseline MAPS 60s  - Midodrine 15 mg 3 times daily  - levo for MAP goal 65  - abx as below     Resp: Possible atypical PNA  - Continue cefepime, day 4, anticipate 5 days  Day 2 doxycycline  - fu final blood cultures, follow-up sputum cultures if able to obtain, follow-up Legionella     GI: Renal diet, low potassium low phos, Protonix 40 mg oral daily     Renal: ESRD on HD - had been getting on 4L total off all week instead of usually 12-15L   Sp 2.9 L removed on 5/17, 2.3 L removed 5/18, 2.5/19.  Repleted calcium     Heme: chronic anemia - stable, most likely ISO chronic disease/cirrhosis/ESRD  DVT (recent) -resumed Eliquis yesterday, on aspirin and Effient (prasugrel)   B12 supplementation daily     ID:  septic shock PNA, potential atypical  Continue  cefepime plus doxycycline  Follow-up Legionella and strep pneumo     Endo: Glucose WNL, patient eating     MSK: chronic wounds - wound care, Right IV is no longer bleeding.     Lines: Right IV, AV fistula on L      Dispo: ICU, palliative care consult

## 2025-05-20 NOTE — ASSESSMENT & PLAN NOTE
"Continue disease directed cares without limits    Despite understanding that with his chronic conditions he is likely to be re-hospitalized, he wishes to continue for the possibility of more time    His saying \"I want to die,\" is not a reflection of an actual desire nor an inclination towards comfort care/hospice.  Due to his stroke and ongoing grief due to serious illness (severely limited physically after being a very active person), there is both mood dysregulation and severe anxiety related to the hospital environment.  He explains that this phrase has underlying meaning of him feeling guilt related to his illness, as well as a desire to be home and away from the hospital.  Although family gives him strength, he has a strong fear of death.                Code Status: Full - Level 1               Decisional apparatus:  Patient is marginally competent on my exam today.  If competence is lost, patient's substitute decision maker would default to spouse by [place] Act 169.               Advance Directive / Living Will / POLST:  None on file  "

## 2025-05-20 NOTE — SOCIAL WORK
"Palliative Inpatient Assessment Note    LSW appreciates the opportunity to provide patient/family with inpatient emotional support and guidance while they continue to receive medical attention from a Palliative provider.    LSW completed an assessment of need which was completed with pt and spouse Eliana.     Relationship status:    Duration of relationship: 38 years   Name of significant other: Eliana  Children and Ages: 4 children   Pets: None  Other important family information: Children are all local and supportive  Living situation (place and with whom): Resides with spouse and 2 children    Patient's primary caregiver: Family assists as needed. Family has been rotating providing 24/7 visits for pt during hospitalization.    Any limitations:  Environmental concerns or barriers:   history: None  Employment history/ Source of income:    Disability:  Concerns regarding literacy: None   Spirituality/ Hindu: Strong Pentecostal domingo--pt has daily visits from his community     Cultural information:   Mental Health and/or Drug and Alcohol history: Mood D/O--hx CVA  Advanced Directives: None    Patient's strengths, social supports, and resources: Strong support from family/friends and [employer] community  Patient/family current level of coping: Pt experiencing significant abrupt changes in emotional state. Pt very tearful then quickly can change to making jokes, and then expressing significant anger. Pt overall struggling with his QOL due to hx stroke and other medical conditions (pt had made statements about being \"disgusted\" and wanting to die--but then immediately expressed concern for his heart and a fear of dying). Please see [facility] provider notes for expanded discussion. Pt/family confirm goals remain disease-focused at this time and pt is agreeable with continued medical interventions and would like to f/u outpt with [facility] team.      Level of understanding: Limited  Patient/family " concerns and areas of need: Dizziness; New neck pain (difficulty turning head); Outpt [facility] f/u; Emotional Support    Above reviewed with [facility] provider.    I have spent 40 minutes with Patient and family today in which greater than 50% of this time was spent in counseling/coordination of care regarding Counseling / Coordination of care.    *All questions may not be answered due to constraints.  Follow-up discussions may need to occur

## 2025-05-20 NOTE — ASSESSMENT & PLAN NOTE
Wt Readings from Last 3 Encounters:   05/20/25 93 kg (205 lb 0.4 oz)   05/02/25 94.3 kg (208 lb)   05/01/25 94.8 kg (209 lb)         Echocardiogram 5/18/2025:    Left Ventricle: Left ventricular cavity size is mildly dilated. LVIDd is 5.90 cm. Wall thickness is mildly increased. There is mild to moderate concentric hypertrophy. The left ventricular ejection fraction is 25%. Systolic function is severely reduced. Wall motion is normal.    Right Ventricle: Right ventricular cavity size is moderately dilated.    Left Atrium: The atrium is severely dilated (42-48 mL/m2).    Right Atrium: The atrium is moderately dilated.

## 2025-05-20 NOTE — PROGRESS NOTES
Patient:  OMAR ANDERSON    MRN:  [id number]    Cmin Request ID:  [id number]    Level of care reserved:  Home Health Agency    Partner Reserved:  [facility], [place] [address] [phone]    Clinical needs requested:    Geography searched:  [phone]    Start of Service:    Request sent:  3:02pm EDT on 5/20/2025 by Galilea Mendez    Partner reserved:  3:35pm EDT on 5/20/2025 by Galilea Mendez    Choice list shared:  3:34pm EDT on 5/20/2025 by Galilea Mendez

## 2025-05-20 NOTE — ASSESSMENT & PLAN NOTE
Most recent Hgb 9.7/Hct 31.5, stable  Continue to monitor CBC periodically  Monitor for signs and symptoms of bleeding  Continue to monitor patient for acute changes in condition  Follow up with PCP

## 2025-05-20 NOTE — ASSESSMENT & PLAN NOTE
Palliative diagnosis: ESRD, chronic combined CHF  PPS: 50-60%    Education/counseling provided on role/purpose of palliative care; benefits/risks of treatment options by our team reviewed; instructions for management and anticipatory guidance provided; supportive listening and presence provided; provided space for life review and whole person assessment    Psychosocial/Spiritual:   -Supported by spouse Eliana, and 4 adult children (Miguel A, Karen, Sam, Adamaris)  - Retired now, but former  40 years, spent a lot of time commuting to [place]  - Enjoys TV (3 stooges, family feud), spending time with family/grandchildren, car rides, [place] music, poker, and food (meat liver)  - Strong Restorationist domingo, very close to  and community ( [facility])    Communicated and coordinated with Palliative LSW    Follow-up planning: Would benefit from outpatient follow-up in our office.  Will coordinate.

## 2025-05-20 NOTE — ASSESSMENT & PLAN NOTE
Echocardiogram 5/18/2025    Aortic Valve: The leaflets are moderately thickened. The leaflets are severely calcified. There is severely reduced mobility. There is mild to moderate regurgitation. There is at least moderate stenosis. The aortic valve mean gradient is 24 mmHg. The dimensionless velocity index is 0.25. The aortic valve area is 1.44 cm2.     They are working with out of network cardiology and CT surgery for repair

## 2025-05-21 ENCOUNTER — APPOINTMENT (INPATIENT)
Dept: DIALYSIS | Facility: HOSPITAL | Age: 65
DRG: 208 | End: 2025-05-21
Attending: INTERNAL MEDICINE
Payer: MEDICARE

## 2025-05-21 LAB
ALBUMIN SERPL BCG-MCNC: 3.8 G/DL (ref 3.5–5)
ALP SERPL-CCNC: 116 U/L (ref 34–104)
ALT SERPL W P-5'-P-CCNC: 5 U/L (ref 7–52)
AMMONIA PLAS-SCNC: 14 UMOL/L (ref 18–72)
ANION GAP SERPL CALCULATED.3IONS-SCNC: 12 MMOL/L (ref 4–13)
AST SERPL W P-5'-P-CCNC: 7 U/L (ref 13–39)
ATRIAL RATE: 78 BPM
ATRIAL RATE: 83 BPM
ATRIAL RATE: 88 BPM
BILIRUB SERPL-MCNC: 0.87 MG/DL (ref 0.2–1)
BUN SERPL-MCNC: 39 MG/DL (ref 5–25)
CALCIUM SERPL-MCNC: 8.3 MG/DL (ref 8.4–10.2)
CHLORIDE SERPL-SCNC: 97 MMOL/L (ref 96–108)
CO2 SERPL-SCNC: 28 MMOL/L (ref 21–32)
CREAT SERPL-MCNC: 6.59 MG/DL (ref 0.6–1.3)
ERYTHROCYTE [DISTWIDTH] IN BLOOD BY AUTOMATED COUNT: 16.7 % (ref 11.6–15.1)
GFR SERPL CREATININE-BSD FRML MDRD: 8 ML/MIN/1.73SQ M
GLUCOSE SERPL-MCNC: 116 MG/DL (ref 65–140)
GLUCOSE SERPL-MCNC: 130 MG/DL (ref 65–140)
GLUCOSE SERPL-MCNC: 130 MG/DL (ref 65–140)
GLUCOSE SERPL-MCNC: 159 MG/DL (ref 65–140)
GLUCOSE SERPL-MCNC: 89 MG/DL (ref 65–140)
HCT VFR BLD AUTO: 26.3 % (ref 36.5–49.3)
HGB BLD-MCNC: 8.2 G/DL (ref 12–17)
INR PPP: 1.34 (ref 0.85–1.19)
MAGNESIUM SERPL-MCNC: 2.1 MG/DL (ref 1.9–2.7)
MCH RBC QN AUTO: 31.9 PG (ref 26.8–34.3)
MCHC RBC AUTO-ENTMCNC: 31.2 G/DL (ref 31.4–37.4)
MCV RBC AUTO: 102 FL (ref 82–98)
P AXIS: 69 DEGREES
P AXIS: 76 DEGREES
P AXIS: 77 DEGREES
PHOSPHATE SERPL-MCNC: 4.8 MG/DL (ref 2.3–4.1)
PLATELET # BLD AUTO: 93 THOUSANDS/UL (ref 149–390)
PMV BLD AUTO: 10.6 FL (ref 8.9–12.7)
POTASSIUM SERPL-SCNC: 4.8 MMOL/L (ref 3.5–5.3)
PR INTERVAL: 178 MS
PR INTERVAL: 188 MS
PR INTERVAL: 200 MS
PROT SERPL-MCNC: 6.5 G/DL (ref 6.4–8.4)
PROTHROMBIN TIME: 17.3 SECONDS (ref 12.3–15)
QRS AXIS: -66 DEGREES
QRS AXIS: -70 DEGREES
QRS AXIS: 214 DEGREES
QRSD INTERVAL: 176 MS
QRSD INTERVAL: 176 MS
QRSD INTERVAL: 178 MS
QT INTERVAL: 446 MS
QT INTERVAL: 486 MS
QT INTERVAL: 496 MS
QTC INTERVAL: 539 MS
QTC INTERVAL: 565 MS
QTC INTERVAL: 571 MS
RBC # BLD AUTO: 2.57 MILLION/UL (ref 3.88–5.62)
SODIUM SERPL-SCNC: 137 MMOL/L (ref 135–147)
T WAVE AXIS: -18 DEGREES
T WAVE AXIS: 26 DEGREES
T WAVE AXIS: 71 DEGREES
VENTRICULAR RATE: 78 BPM
VENTRICULAR RATE: 83 BPM
VENTRICULAR RATE: 88 BPM
WBC # BLD AUTO: 5.26 THOUSAND/UL (ref 4.31–10.16)

## 2025-05-21 PROCEDURE — 97163 PT EVAL HIGH COMPLEX 45 MIN: CPT

## 2025-05-21 PROCEDURE — 84100 ASSAY OF PHOSPHORUS: CPT

## 2025-05-21 PROCEDURE — 85610 PROTHROMBIN TIME: CPT

## 2025-05-21 PROCEDURE — 99232 SBSQ HOSP IP/OBS MODERATE 35: CPT | Performed by: INTERNAL MEDICINE

## 2025-05-21 PROCEDURE — 93010 ELECTROCARDIOGRAM REPORT: CPT | Performed by: INTERNAL MEDICINE

## 2025-05-21 PROCEDURE — 80053 COMPREHEN METABOLIC PANEL: CPT

## 2025-05-21 PROCEDURE — 82140 ASSAY OF AMMONIA: CPT

## 2025-05-21 PROCEDURE — 83735 ASSAY OF MAGNESIUM: CPT

## 2025-05-21 PROCEDURE — 97167 OT EVAL HIGH COMPLEX 60 MIN: CPT

## 2025-05-21 PROCEDURE — 85027 COMPLETE CBC AUTOMATED: CPT

## 2025-05-21 PROCEDURE — 82948 REAGENT STRIP/BLOOD GLUCOSE: CPT

## 2025-05-21 RX ORDER — MAGNESIUM SULFATE HEPTAHYDRATE 40 MG/ML
2 INJECTION, SOLUTION INTRAVENOUS ONCE
Status: COMPLETED | OUTPATIENT
Start: 2025-05-21 | End: 2025-05-21

## 2025-05-21 RX ADMIN — ASPIRIN 81 MG: 81 TABLET, CHEWABLE ORAL at 09:42

## 2025-05-21 RX ADMIN — MIDODRINE HYDROCHLORIDE 10 MG: 5 TABLET ORAL at 12:27

## 2025-05-21 RX ADMIN — CEFEPIME 1000 MG: 1 INJECTION, POWDER, FOR SOLUTION INTRAMUSCULAR; INTRAVENOUS at 17:18

## 2025-05-21 RX ADMIN — CYANOCOBALAMIN TAB 500 MCG 250 MCG: 500 TAB at 09:42

## 2025-05-21 RX ADMIN — CHLORHEXIDINE GLUCONATE 15 ML: 1.2 SOLUTION ORAL at 09:42

## 2025-05-21 RX ADMIN — MIDODRINE HYDROCHLORIDE 15 MG: 5 TABLET ORAL at 17:18

## 2025-05-21 RX ADMIN — DOXYCYCLINE 100 MG: 100 CAPSULE ORAL at 09:42

## 2025-05-21 RX ADMIN — DIVALPROEX SODIUM 250 MG: 125 CAPSULE, COATED PELLETS ORAL at 09:42

## 2025-05-21 RX ADMIN — Medication 3 MG: at 22:35

## 2025-05-21 RX ADMIN — APIXABAN 2.5 MG: 2.5 TABLET, FILM COATED ORAL at 17:18

## 2025-05-21 RX ADMIN — DIVALPROEX SODIUM 250 MG: 125 CAPSULE, COATED PELLETS ORAL at 22:35

## 2025-05-21 RX ADMIN — MAGNESIUM SULFATE HEPTAHYDRATE 2 G: 40 INJECTION, SOLUTION INTRAVENOUS at 13:12

## 2025-05-21 RX ADMIN — ATORVASTATIN CALCIUM 40 MG: 40 TABLET, FILM COATED ORAL at 17:18

## 2025-05-21 RX ADMIN — DOXYCYCLINE 100 MG: 100 CAPSULE ORAL at 22:34

## 2025-05-21 RX ADMIN — PRASUGREL 10 MG: 10 TABLET, FILM COATED ORAL at 17:18

## 2025-05-21 RX ADMIN — APIXABAN 2.5 MG: 2.5 TABLET, FILM COATED ORAL at 09:42

## 2025-05-21 RX ADMIN — MIDODRINE HYDROCHLORIDE 15 MG: 5 TABLET ORAL at 12:14

## 2025-05-21 RX ADMIN — MIDODRINE HYDROCHLORIDE 15 MG: 5 TABLET ORAL at 09:42

## 2025-05-21 NOTE — ASSESSMENT & PLAN NOTE
Lab Results   Component Value Date    EGFR 8 05/21/2025    EGFR 11 05/20/2025    EGFR 8 05/19/2025    CREATININE 6.59 (H) 05/21/2025    CREATININE 4.86 (H) 05/20/2025    CREATININE 6.11 (H) 05/19/2025   -- Mircera as an outpatient hemoglobin stable

## 2025-05-21 NOTE — ASSESSMENT & PLAN NOTE
3/17/25 ([facility]) - [facility] 90% pLAD stenosis, severe plaque burden, in-stent restenosis with areas of underexpansion and calcified nodules in vision between overlap of stents --> POBA with drug-coated balloon for in-stent restenosis, no new stent  Status post PCI to LAD  Continue aspirin, prasugrel with recent PCI  noted to be Plavix nonresponder at OSH with history of in-stent restenosis

## 2025-05-21 NOTE — PLAN OF CARE
Problem: OCCUPATIONAL THERAPY ADULT  Goal: Performs self-care activities at highest level of function for planned discharge setting.  See evaluation for individualized goals.  Description: Treatment Interventions: ADL retraining, Functional transfer training, Endurance training, Cognitive reorientation, Patient/family training, Equipment evaluation/education, Neuromuscular reeducation, Compensatory technique education, Continued evaluation, Energy conservation, Activityengagement  Equipment Recommended: Bedside commode (w/c)       See flowsheet documentation for full assessment, interventions and recommendations.   Note: Limitation: Decreased ADL status, Decreased Safe judgement during ADL, Decreased cognition, Decreased endurance, Decreased self-care trans, Decreased high-level ADLs (pain, balance, fxnl mobility, act jose miguel, fxnl reach, standing jose miguel, and strength, attention to task, safety awareness, insight, orientation, problem solving, and learning new tasks, emotional regulation, response time)  Prognosis: Good  Assessment: Pt is a 65 y.o. male seen for OT evaluation s/p admit to [facility] on 5/17/2025 w/Shock ([facility]). Prior to admission, pt was living with spouse in a bi-level house, (A) with ADLs/IADLs, mod (I) w/ RW, (+) falls, (-) . Personal and environmental factors affecting patient at time of evaluation include current habits and behavioral patterns, lifestyle patterns, limited social support, inaccessible home environment, inaccessible bathroom environment, and difficulty completing ADLs. Personal factors supporting patient at time of evaluation include age, supportive spouse who provides 24 hr care, and (A) with all ADLs. Based upon this evaluation, pt is functioning below baseline. Pt will benefit from continued skilled inpatient OT to maximize safety, level of independence and overall performance in ADLs, functional transfers, functional mobility to return to functional baseline/highest  level of function.     Rehab Resource Intensity Level, OT: II (Moderate Resource Intensity)     TRISTAN Raymond, OTR/L  [place] License [id number]  [place] License [id number]

## 2025-05-21 NOTE — PROGRESS NOTES
Progress Note - Cardiology   Name: Asad Galloway 65 y.o. male I MRN: [id number]  Unit/Bed#: S -01 I Date of Admission: 5/17/2025   Date of Service: 5/21/2025 I Hospital Day: 4     Assessment & Plan  ESRD on dialysis (HCC)  Lab Results   Component Value Date    EGFR 8 05/21/2025    EGFR 11 05/20/2025    EGFR 8 05/19/2025    CREATININE 6.59 (H) 05/21/2025    CREATININE 4.86 (H) 05/20/2025    CREATININE 6.11 (H) 05/19/2025     Hypotension  Suspected that his presenting episode of unresponsiveness and low BP due to low blood pressure post dialysis and fluid removal, in the setting of severe AS and cardiomyopathy, sepsis   PTA - takes midodrine prior to HD  -Now off norepi gtt  -On midodrine 15 mg TID  -Continue off Entresto and continue holding BB today for low BP  CAD, multiple vessel  Recent PCI in March 2025  -Continue aspirin, prasugrel  Ischemic cardiomyopathy  LVEF 25-30%  LifeVest without arrhythmia or therapies delivered  -Metoprolol and Entresto held due to above    Nonrheumatic aortic valve stenosis  Moderate to severe AS  Ongoing TAVR work up at [facility], [place]    Subjective   Interval history: Patient seen and examined.  No acute events overnight.    Objective :  Temp:  [97.6 °F (36.4 °C)-97.7 °F (36.5 °C)] 97.7 °F (36.5 °C)  HR:  [] 93  BP: ()/(43-65) 91/50  Resp:  [6-30] 16  SpO2:  [72 %-100 %] 94 %  Orthostatic Blood Pressures      Flowsheet Row Most Recent Value   Blood Pressure 91/50 filed at 05/21/2025 1515   Patient Position - Orthostatic VS Sitting filed at 05/21/2025 1211          First Weight: Weight - Scale: 107 kg (235 lb 7.2 oz) (05/17/25 0302)  Vitals:    05/19/25 1230 05/20/25 0525   Weight: 93.2 kg (205 lb 7.5 oz) 93 kg (205 lb 0.4 oz)     Physical Exam  Vitals reviewed.   Constitutional:       General: He is not in acute distress.     Appearance: He is not toxic-appearing.     Eyes:      General: No scleral icterus.      Cardiovascular:      Rate and Rhythm:  Regular rhythm.      Heart sounds: Murmur heard.   Pulmonary:      Effort: Pulmonary effort is normal. No respiratory distress.      Breath sounds: Normal breath sounds. No wheezing or rales.   Abdominal:      General: Bowel sounds are normal. There is no distension.      Palpations: Abdomen is soft.      Tenderness: There is no abdominal tenderness.     Skin:     General: Skin is warm and dry.           Lab Results: I have reviewed the following results:CBC/BMP:   .     05/21/25  0600   WBC 5.26   HGB 8.2*   HCT 26.3*   PLT 93*   SODIUM 137   K 4.8   CL 97   CO2 28   BUN 39*   CREATININE 6.59*   GLUC 89   MG 2.1   PHOS 4.8*      Results from last 7 days   Lab Units 05/21/25  0600 05/20/25  0524 05/19/25  0321   WBC Thousand/uL 5.26 9.05 9.33   HEMOGLOBIN g/dL 8.2* 8.6* 8.7*   HEMATOCRIT % 26.3* 27.3* 27.9*   PLATELETS Thousands/uL 93* 119* 104*     Results from last 7 days   Lab Units 05/21/25  0600 05/20/25  0524 05/19/25  0321 05/17/25  0334 05/17/25  0305   POTASSIUM mmol/L 4.8 4.2 4.6   < >  --    CHLORIDE mmol/L 97 95* 93*   < >  --    CO2 mmol/L 28 30 29   < >  --    CO2, I-STAT mmol/L  --   --   --   --  27   BUN mg/dL 39* 26* 36*   < >  --    CREATININE mg/dL 6.59* 4.86* 6.11*   < >  --    GLUCOSE, ISTAT mg/dl  --   --   --   --  152*   CALCIUM mg/dL 8.3* 8.2* 8.1*   < >  --     < > = values in this interval not displayed.     Results from last 7 days   Lab Units 05/21/25  0600 05/20/25  0524 05/19/25  0542 05/18/25  2135 05/18/25  1523 05/17/25  1850 05/17/25  1029   INR  1.34* 1.27*  --   --   --   --  1.58*   PTT seconds  --   --  149* >210* 53*   < > 116*    < > = values in this interval not displayed.     Lab Results   Component Value Date    HGBA1C 5.4 04/10/2025     Lab Results   Component Value Date    CKTOTAL 188 11/12/2022    CKMB 4.0 11/12/2022    CKMBINDEX 2.1 11/12/2022    TROPONINI <0.02 09/08/2021

## 2025-05-21 NOTE — ASSESSMENT & PLAN NOTE
Lab Results   Component Value Date    EGFR 8 05/21/2025    EGFR 11 05/20/2025    EGFR 8 05/19/2025    CREATININE 6.59 (H) 05/21/2025    CREATININE 4.86 (H) 05/20/2025    CREATININE 6.11 (H) 05/19/2025   #ESRD on HD MWF:  Dialysis unit/days: [facility]   Access:AV fistula  EDW 95 kg, unable to achieve dry weight as per family because he drinks too much water.  Fluid restriction less than 1.5 L/day  Reinforced compliance  Clinically improving  Plan for dialysis this afternoon

## 2025-05-21 NOTE — ASSESSMENT & PLAN NOTE
Lab Results   Component Value Date    EGFR 8 05/21/2025    EGFR 11 05/20/2025    EGFR 8 05/19/2025    CREATININE 6.59 (H) 05/21/2025    CREATININE 4.86 (H) 05/20/2025    CREATININE 6.11 (H) 05/19/2025

## 2025-05-21 NOTE — PLAN OF CARE
Pt. On HD treatment for 3 hours with a UF goal of 2 L as tolerated. Pt on a 2 k 2.5 rakan bath for a potassium of 4.8 on 05/21/25.  Problem: METABOLIC, FLUID AND ELECTROLYTES - ADULT  Goal: Electrolytes maintained within normal limits  Description: INTERVENTIONS:  - Monitor labs and assess patient for signs and symptoms of electrolyte imbalances  - Administer electrolyte replacement as ordered  - Monitor response to electrolyte replacements, including repeat lab results as appropriate  - Instruct patient on fluid and nutrition as appropriate  Outcome: Progressing  Goal: Fluid balance maintained  Description: INTERVENTIONS:  - Monitor labs   - Monitor I/O and WT  - Instruct patient on fluid and nutrition as appropriate  - Assess for signs & symptoms of volume excess or deficit  Outcome: Progressing

## 2025-05-21 NOTE — ASSESSMENT & PLAN NOTE
Noncompliant with fluid restriction as an outpatient.  Patient has underlying cardiomyopathy and severe aortic stenosis.  Ultrafiltration as blood pressure allows  Blood pressure currently stable  Continue midodrine 15 mg 3 times daily

## 2025-05-21 NOTE — ASSESSMENT & PLAN NOTE
Metoprolol, entresto on hold due to low blood pressure   CHF with acute exacerbation, LVEF 30 to 35%.  Acute volume overload due to poor HD sessions.  Up 10K from dry weight.  Aortic stenosis, moderate to severe, ongoing TAVR workup [facility], [place]   Echo 5/18: LVEF 25%. Systolic function is severely reduced.  Moderate/severe AAS, mild/moderate TR     Plan:  Resume GDMT if blood pressures allow  Cardiology following   What Type Of Note Output Would You Prefer (Optional)?: Standard Output How Did Your Itching Occur?: sudden in onset (over a period of weeks to a few months) How Severe Is Your Itching?: mild

## 2025-05-21 NOTE — CASE MANAGEMENT
Case Management Discharge Planning Note    Patient name Asad Galloway  Location S /S -01 MRN [id number]  : 1960 Date 2025       Current Admission Date: 2025  Current Admission Diagnosis:Shock ([facility])   Patient Active Problem List    Diagnosis Date Noted    Shock ([facility]) 2025    Goals of care, counseling/discussion 2025    Nonrheumatic aortic valve stenosis 2025    Encounter for dialysis ([facility]) 2025    Palliative care by specialist 2025    Sepsis ([facility]) 2025    Pneumonia 2025    Sarcopenia 2025    Impaired mobility and ADLs 2025    Hyperkalemia 2025    Hypotension 2025    Cirrhosis ([facility]) 2025    Fluid overload 2025    Chronic kidney disease-mineral and bone disorder 2025    Anemia due to chronic kidney disease, on chronic dialysis ([facility]) 2025    HFrEF (heart failure with reduced ejection fraction) ([facility]) 2025    SIRS (systemic inflammatory response syndrome) ([facility]) 2025    Acute encephalopathy 2025    Peripheral arterial disease ([facility]) 2025    Type 2 diabetes mellitus ([facility]) 2024    Ambulatory dysfunction 2024    Thrombocytopenia ([facility]) 2024    Chronic deep vein thrombosis (DVT) of distal vein of right lower extremity ([facility]) 2024    Bicytopenia 2024    Obesity (BMI 30.0-34.9) 2024    QT prolongation 2024    Acute embolism and thrombosis of right peroneal vein ([facility]) 2024    Right ankle pain 2024    Pre-diabetes 2024    Hypertensive heart and chronic kidney disease with heart failure and with stage 5 chronic kidney disease, or end stage renal disease ([facility]) 2024    PAD (peripheral artery disease) ([facility]) 2024    Chronic systolic heart failure ([facility]) 2024    Edema of left lower extremity 2023    Rectal bleeding 2023    Presence of external cardiac defibrillator 2023    Diabetic polyneuropathy  associated with type 2 diabetes mellitus (HCC) 03/07/2023    Absence of toe of right foot (HCC) 03/07/2023    Hammer toes of both feet 03/07/2023    Ischemic cardiomyopathy 02/02/2023    Aortic stenosis 02/02/2023    Mood disorder (HCC) 02/02/2023    Old myocardial infarction 02/02/2023    Hyponatremia 02/01/2023    Fall 01/06/2023    SOB (shortness of breath) 10/21/2022    Left arm swelling 10/21/2022    CAD, multiple vessel 07/14/2022    Electrolyte abnormality 05/02/2022    Chest pain 05/01/2022    Generalized weakness 04/19/2022    Primary hypertension 04/06/2022    Emotional lability 01/19/2022    History of 2019 novel coronavirus disease (COVID-19) 12/26/2020    ESRD on dialysis ([facility]) 12/26/2020    Anemia 10/05/2020    S/P arteriovenous (AV) fistula creation 04/27/2020    Pre-kidney transplant, listed 04/27/2020    Urge incontinence 12/20/2019    Anxiety associated with depression 02/28/2019    History of stroke in adulthood 10/04/2018    Abnormal EEG 09/24/2018    Other constipation 08/17/2018    GERD (gastroesophageal reflux disease) 08/13/2018    Elevated alkaline phosphatase level 08/03/2018    Nephrotic range proteinuria 03/28/2018    Dizziness 02/26/2016    Mixed hyperlipidemia 02/26/2016    Antiplatelet or antithrombotic long-term use 02/26/2016      LOS (days): 4  Geometric Mean LOS (GMLOS) (days): 4.9  Days to GMLOS:0.7     OBJECTIVE:  Risk of Unplanned Readmission Score: 53.83         Current admission status: Inpatient   Preferred Pharmacy:   [id number] - [place] - [address]  [address] [place] [address]  Phone: [phone] Fax: [phone]    Primary Care Provider: BRITTANY Allen    Primary Insurance: MEDICARE  Secondary Insurance: [place] [employer]    DISCHARGE DETAILS:      Other Referral/Resources/Interventions Provided:  Referral Comments: Pt is recommended for STR. CM reviewed rehab rec's with Pts wife who states that she prefers to  take the Pt home w/ [facility] (SN, PT), reserved in [place]. Pts wife does not want STR referrals sent.

## 2025-05-21 NOTE — HEMODIALYSIS
Post-Dialysis RN Treatment Note    Blood Pressure:  Pre 99/51 mm/Hg  Post 89/48 mmHg   EDW:  95 kg    Weight:  Pre None Reported kg   Post 94 kg   Mode of weight measurement: Bed scale   Volume Removed:  800 ml    Treatment duration: 180 minutes    NS given:  No    Treatment shortened Yes, describe: Per pt 30 minutes   Medications given during Rx: Midodrine 10 mg, Mag 2 GM IV   Estimated Kt/V:  None Reported   Access type: AV fistula   Needle Gauge: 15/G   Access Issues: No    Report called to primary nurse:   Yes Ruben LOPEZ RN

## 2025-05-21 NOTE — PHYSICAL THERAPY NOTE
Physical Therapy Evaluation    Patient's Name: Asad Galloway    Admitting Diagnosis  Altered mental status [R41.82]  Anemia of chronic disease [D63.8]  Unresponsive [R41.89]  Acute respiratory failure with hypoxia ([facility]) [J96.01]  Acute encephalopathy [G93.40]  Hypothermia, initial encounter [T68.XXXA]  HFrEF (heart failure with reduced ejection fraction) ([facility]) [I50.20]  Presence of external cardiac defibrillator [Z95.810]    Problem List  Problem List[1]    Past Medical History  Past Medical History:   Diagnosis Date    Cerebrovascular accident (CVA) due to thrombosis of left middle cerebral artery ([facility]) 07/29/2018    Chronic kidney disease     Coronary artery disease     Diabetes mellitus ([facility])     not on meds    Dialysis patient ([facility])     M-W-F    Fistula     left upper arm for hemodialyis    GERD (gastroesophageal reflux disease)     History of coronary artery stent placement     x3    Hypercholesteremia     Hyperlipidemia     Hypertension     Infectious viral hepatitis     B as child    Limb alert care status     no BP/IV left arm    Neuropathy     Nonhealing ulcer of heel ([facility]) 04/25/2023    Obesity     Osteomyelitis ([facility])     last assessed 11/4/16-per son not currently    Penetrating foot wound, left, initial encounter 01/19/2022    PVC's (premature ventricular contractions)     sees  Cardio    Stroke ([facility])     last weeof July 2018 [facility]    TIA (transient ischemic attack) 10/28/2018    Ulcer of left heel, limited to breakdown of skin ([facility]) 06/13/2024    Wears dentures     Wears glasses        Past Surgical History  Past Surgical History:   Procedure Laterality Date    ABDOMINAL SURGERY      CARDIAC CATHETERIZATION N/A 05/02/2022    Procedure: Cardiac Coronary Angiogram;  Surgeon: Sam Davis MD;  [facility];  Service: Cardiology    CARDIAC CATHETERIZATION N/A 05/02/2022    Procedure: Cardiac pci;  Surgeon: Sam Davis MD;  [facility];  Service:  "Cardiology    CARDIAC CATHETERIZATION  02/01/2023    Procedure: Cardiac catheterization;  Surgeon: Sam Davis MD;  [facility]: [facility];  Service: Cardiology    CARDIAC CATHETERIZATION N/A 02/01/2023    Procedure: Cardiac pci;  Surgeon: Sam Davis MD;  [facility];  Service: Cardiology    CARDIAC CATHETERIZATION N/A 02/01/2023    Procedure: Cardiac Coronary Angiogram;  Surgeon: Sam Davis MD;  [facility];  Service: Cardiology    CARDIAC CATHETERIZATION N/A 02/01/2023    Procedure: Cardiac other-IVUS;  Surgeon: Sam Davis MD;  [facility]: [facility];  Service: Cardiology    CHOLECYSTECTOMY      Percutaneous    COLONOSCOPY      CORONARY ANGIOPLASTY WITH STENT PLACEMENT      CYSTOSCOPY      HEMODIALYSIS ADULT  1/22/2024    HEMODIALYSIS ADULT  01/24/2024    HEMODIALYSIS ADULT  5/19/2025    IR LOWER EXTREMITY ANGIOGRAM  9/29/2023    IR LOWER EXTREMITY ANGIOGRAM  02/26/2024    OTHER SURGICAL HISTORY      \"stimulator to control bowel movements\"    TN ESOPHAGOGASTRODUODENOSCOPY TRANSORAL DIAGNOSTIC N/A 09/27/2016    Procedure: ESOPHAGOGASTRODUODENOSCOPY (EGD);  Surgeon: Adele Rowe MD;  Location: [facility];  Service: Gastroenterology    TN LAPAROSCOPY SURG CHOLECYSTECTOMY N/A 02/29/2016    Procedure: LAPAROSCOPIC CHOLECYSTECTOMY ;  Surgeon: Cliff Roman DO;  Location: [facility];  Service: General    TN SLCTV CATHJ 3RD+ ORD SLCTV ABDL PEL/LXTR BRNC Left 9/29/2023    Procedure: Left leg angiogram with intervention;  Surgeon: Michelle Galvan MD;  Location: [facility];  Service: Vascular    TN SLCTV CATHJ 3RD+ ORD SLCTV ABDL PEL/LXTR BRNC Left 02/26/2024    Procedure: Left leg angiogram antegrade access, left popliteal angioplasty;  Surgeon: Michelle Galvan MD;  Location: [facility];  Service: Vascular    ROTATOR CUFF REPAIR Right     SPINAL CORD STIMULATOR IMPLANT      \"[employer]tronic interstim model # 3058- in lower back to control bowel " "movements-currently turned off-battery is dead\"    TOE AMPUTATION Right 10/28/2016    Procedure: 3RD TOE AMPUTATION ;  Surgeon: Anjel Salas DPM;  Location: [facility];  Service:           25   PT Last Visit   PT Visit Date 25   Note Type   Note type Evaluation   Pain Assessment   Pain Assessment Tool 0-10   Pain Score 10 - Worst Possible Pain   Pain Location/Orientation Location: Generalized   Effect of Pain on Daily Activities limits comfort   Hospital Pain Intervention(s) Repositioned;Ambulation/increased activity   Restrictions/Precautions   Weight Bearing Precautions Per Order No   Other Precautions Cognitive;Bed Alarm;Chair Alarm;Fall Risk;Pain;Restraints  (BL soft wrist restraints)   Home Living   Type of Home House   Home Layout Able to live on main level with bedroom/bathroom;Performs ADLs on one level  ((0 RAFAT through basement level, able to maintain single level living on that level with bedroom/bathroom/kitchen; also has full bed/bath/kitchen on 2nd level))   Bathroom Shower/Tub Walk-in shower   Bathroom Toilet Raised   Bathroom Equipment Built-in shower seat;Grab bars in shower;Grab bars around toilet   Home Equipment Walker;   Prior Function   Lives With Spouse   Receives Help From Family   IADLs Family/Friend/Other provides transportation;Family/Friend/Other provides meals;Family/Friend/Other provides medication management   Falls in the last 6 months 1 to 4   Comments at baseline ambulates with RW, pt wife provides care for A/IADLs   General   Family/Caregiver Present Yes   Cognition   Orientation Level Oriented to person   Following Commands Follows one step commands with increased time or repetition   Comments pt ID by wristband, name and    Subjective   Subjective supine in bed, agreeable to PT eval   RLE Assessment   RLE Assessment X  (grossly assessed to at least 3+/5 with mobility)   LLE Assessment   LLE Assessment X  (grossly assessed to at least 3+/5 with mobility) "   Bed Mobility   Supine to Sit 3  Moderate assistance   Additional items Assist x 1;Increased time required  (trunk management)   Additional Comments sits EOB with minAx1 to maintain static positioning. at EOB reports dizziness post bed mobility, BP of 107/48  OOB in recliner post   Transfers   Sit to Stand 3  Moderate assistance   Additional items Assist x 1;Increased time required;Verbal cues   Stand to Sit 3  Moderate assistance   Additional items Assist x 1;Increased time required;Verbal cues   Additional Comments use of RW for transfers. poorly controlled descent to bed side recliner, does not utilize UE support to descend   Ambulation/Elevation   Gait pattern Improper Weight shift;Wide JOSE DAVID;Decreased foot clearance;Short stride;Step to   Gait Assistance 3  Moderate assist   Additional items Assist x 1   Assistive Device Bariatric Rolling walker   Distance 4'x1   Ambulation/Elevation Additional Comments vc for RW management and sequencing of gait cylce to target surface, grossly unsteady with lateral deviation from path. pt declined further ambulation trial due to desire to eat breakfast   Balance   Static Sitting Poor +   Dynamic Sitting Poor   Static Standing Poor  (RW)   Dynamic Standing Poor   Ambulatory Poor -  (RW)   Activity Tolerance   Activity Tolerance Patient limited by fatigue  (cognition)   Medical Staff Made Aware care coordinated with OT due to medical complexity, comorbidites and deviation from functional baseline, spoke with CM   Nurse Made Aware RN tiffany pre/post. gives permission for restraints to be off of pt in bed side recliner   Assessment   Prognosis Fair   Problem List Decreased strength;Decreased endurance;Impaired balance;Decreased mobility;Decreased cognition;Impaired judgement;Decreased safety awareness;Obesity;Decreased skin integrity   Assessment Pt is a 65 y.o. male seen for PT evaluation s/p admit to [facility] on 5/17/2025. Pt was admitted with a primary dx of: shock,  AMS, anemia of chronic disease, unresponsive, acute respiratory failure with hypoxia, acute encephalopathy, hypothermia, heart failure with reduced ejection fraction, presence of external cardiac defibrillator.  PT now consulted for assessment of mobility and d/c needs. Pt with  PT eval and treat  orders.  Pts current comorbidities and personal factors effecting treatment include: BMI, CVA, CKD, CAD, DM, . Pts current clinical presentation is Unstable/Unpredictable (high complexity) due to Ongoing medical management for primary dx, Decreased activity tolerance compared to baseline, Fall risk, Increased assistance needed from caregiver at current time, Ongoing telemetry monitoring, Cog status. Prior to admission, pt was independent with use of RW. Upon evaluation, pt currently is requiring ModA for bed mobility; ModA for transfers and ModA for ambulation 4 ft w/ RW. Pt presents at PT eval functioning below baseline and currently w/ overall mobility deficits 2* to: BLE weakness, impaired balance, gait deviations, pain, decreased activity tolerance compared to baseline, decreased functional mobility tolerance compared to baseline, decreased safety awareness, impaired judgement, fall risk, decreased skin integrity, decreased cognition. Pt currently at a fall risk 2* to impairments listed above.  Pt will continue to benefit from skilled acute PT interventions to address stated impairments; to maximize functional mobility; for ongoing pt/ family training; and DME needs. At conclusion of PT session chair alarm engaged, all needs in reach, RN notified of session findings/recommendations, and pt returned back in recliner chair with phone and call bell within reach. Pt denies any further questions at this time. Recommend Level II (Moderate Resource Intensity)  upon hospital D/C.   Goals   Patient Goals no direct pt goals. pt family would like pt to be more independent, return to PLOF   STG Expiration Date 05/31/25   Short  Term Goal #1 In 10 days pt will be able to: 1. Demonstrate ability to perform all aspects of bed mobility independently to improve functional safety.  2. Perform functional transfers with RW and supervision to facilitate safe return to previous living environment.  3.  Ambulate at least 75 ft with RW and supervision with stable vitals to improve safety with household distances and reduce fall risk.  4. Improve LE strength grades by 1 to increase ease of functional mobility with transfers and gait. 5. Pt will demonstrate improved balance by one grade in order to decrease risk of falls.   PT Treatment Day 0   Plan   Treatment/Interventions Functional transfer training;LE strengthening/ROM;Therapeutic exercise;Endurance training;Patient/family training;Cognitive reorientation;Bed mobility;Equipment eval/education;Gait training;Spoke to nursing;Spoke to case management;OT;Elevations   PT Frequency 3-5x/wk   Discharge Recommendation   Rehab Resource Intensity Level, PT II (Moderate Resource Intensity)   Equipment Recommended   (pt has RW)   AM-PAC Basic Mobility Inpatient   Turning in Flat Bed Without Bedrails 2   Lying on Back to Sitting on Edge of Flat Bed Without Bedrails 2   Moving Bed to Chair 2   Standing Up From Chair Using Arms 2   Walk in Room 2   Climb 3-5 Stairs With Railing 1   Basic Mobility Inpatient Raw Score 11   Basic Mobility Standardized Score 30.25   [facility] Highest Level Of Mobility   -[facility] Goal 4: Move to chair/commode   -[facility] Achieved 4: Move to chair/commode   End of Consult   Patient Position at End of Consult Bedside chair;Bed/Chair alarm activated;All needs within reach   The patient's AM-PAC Basic Mobility Inpatient Short Form Raw Score is 11. A Raw score of less than or equal to 16 suggests the patient may benefit from discharge to post-acute rehabilitation services. Please also refer to the recommendation of the Physical Therapist for safe discharge planning.    Rayo Ruffin,  PT           [1]   Patient Active Problem List  Diagnosis    Dizziness    Mixed hyperlipidemia    Antiplatelet or antithrombotic long-term use    Nephrotic range proteinuria    Elevated alkaline phosphatase level    GERD (gastroesophageal reflux disease)    Other constipation    Abnormal EEG    History of stroke in adulthood    Anxiety associated with depression    Urge incontinence    S/P arteriovenous (AV) fistula creation    Pre-kidney transplant, listed    Anemia    History of 2019 novel coronavirus disease (COVID-19)    ESRD on dialysis ([facility])    Emotional lability    Primary hypertension    Generalized weakness    Chest pain    Electrolyte abnormality    CAD, multiple vessel    SOB (shortness of breath)    Left arm swelling    Fall    Hyponatremia    Ischemic cardiomyopathy    Aortic stenosis    Mood disorder (HCC)    Diabetic polyneuropathy associated with type 2 diabetes mellitus (HCC)    Absence of toe of right foot (HCC)    Hammer toes of both feet    Presence of external cardiac defibrillator    Rectal bleeding    Edema of left lower extremity    Chronic systolic heart failure (HCC)    Old myocardial infarction    Hypertensive heart and chronic kidney disease with heart failure and with stage 5 chronic kidney disease, or end stage renal disease ([facility])    PAD (peripheral artery disease) ([facility])    Pre-diabetes    Right ankle pain    Acute embolism and thrombosis of right peroneal vein ([facility])    Bicytopenia    Obesity (BMI 30.0-34.9)    QT prolongation    Chronic deep vein thrombosis (DVT) of distal vein of right lower extremity (HCC)    Thrombocytopenia (HCC)    Type 2 diabetes mellitus (HCC)    Ambulatory dysfunction    Peripheral arterial disease (HCC)    Chronic kidney disease-mineral and bone disorder    Anemia due to chronic kidney disease, on chronic dialysis (HCC)    HFrEF (heart failure with reduced ejection fraction) (HCC)    SIRS (systemic inflammatory response syndrome) ([facility])    Acute encephalopathy     Fluid overload    Hypotension    Cirrhosis (HCC)    Hyperkalemia    Sarcopenia    Impaired mobility and ADLs    Sepsis (HCC)    Pneumonia    Shock (HCC)    Goals of care, counseling/discussion    Nonrheumatic aortic valve stenosis    Encounter for dialysis ([facility])    Palliative care by specialist

## 2025-05-21 NOTE — PROGRESS NOTES
Progress Note - Hospitalist   Name: Asad Galloway 65 y.o. male I MRN: [id number]  Unit/Bed#: S -01 I Date of Admission: 5/17/2025   Date of Service: 5/21/2025 I Hospital Day: 4    Assessment & Plan  Hypotension  Patient requiring norepinephrine to maintain maps above 65.  Borderline BP.  Low blood blood pressure postdialysis and fluid removal that day in the setting of severe AS and cardiomyopathy.  Also consideration of sepsis/infection, suspected pneumonia on CT.  Sepsis versus some component of cardiogenic shock     Plan:  Continue midodrine 15 mg 3 times daily  BP currently stable at 102/43  Will try to decrease of midodrine as able   Can give albumin as needed for hypotension but avoid fluids due to low EF and severe AS   Dialysis scheduled for today 5/21  Will consider restarting metoprolol if BP remains stable  PT/OT evaluation   History of stroke in adulthood  Thrombosis of left MCA in 2018  No residual symptoms, except cognitive impairment (per notes)  Poor historian  Continue aspirin  Anemia  Likely 2/2 to CKD  Continue to trend CBC daily   Epogen c/I in the setting of CVA  ESRD on dialysis (HCC)  Lab Results   Component Value Date    EGFR 8 05/21/2025    EGFR 11 05/20/2025    EGFR 8 05/19/2025    CREATININE 6.59 (H) 05/21/2025    CREATININE 4.86 (H) 05/20/2025    CREATININE 6.11 (H) 05/19/2025     CAD, multiple vessel  3/17/25 ([place]) - [facility] 90% pLAD stenosis, severe plaque burden, in-stent restenosis with areas of underexpansion and calcified nodules in vision between overlap of stents --> POBA with drug-coated balloon for in-stent restenosis, no new stent  Status post PCI to LAD  Continue aspirin, prasugrel with recent PCI  noted to be Plavix nonresponder at OSH with history of in-stent restenosis  Ischemic cardiomyopathy  Metoprolol, entresto on hold due to low blood pressure   CHF with acute exacerbation, LVEF 30 to 35%.  Acute volume overload due to poor HD sessions.  Up 10K  "from dry weight.  Aortic stenosis, moderate to severe, ongoing TAVR workup [place]   Echo 5/18: LVEF 25%. Systolic function is severely reduced.  Moderate/severe AAS, mild/moderate TR     Plan:  Resume GDMT if blood pressures allow  Cardiology following  Mood disorder (HCC)  Unspecified mood disorder, patient previously on Depakote. Was discontinued during his previous admission in [facility] due to concerns for thrombocytopenia.   Avoid QTC prolonging medications due to QTC of 550  Can give IV Ativan prn for agitation judiciously   Absence of toe of right foot ([facility])  Chronic wounds, wound care on board  Chronic systolic heart failure ([facility])  Wt Readings from Last 3 Encounters:   05/20/25 93 kg (205 lb 0.4 oz)   05/02/25 94.3 kg (208 lb)   05/01/25 94.8 kg (209 lb)     Cardiology following  Most recent EF 25% with evidence of severe aortic stenosis   Resume GDMT if blood pressures allow  Cardiology following  Chronic deep vein thrombosis (DVT) of distal vein of right lower extremity ([facility])  On Eliquis 2.5 mg BID  Anemia due to chronic kidney disease, on chronic dialysis ([facility])  Lab Results   Component Value Date    EGFR 8 05/21/2025    EGFR 11 05/20/2025    EGFR 8 05/19/2025    CREATININE 6.59 (H) 05/21/2025    CREATININE 4.86 (H) 05/20/2025    CREATININE 6.11 (H) 05/19/2025     Cirrhosis ([facility])  Cirrhotic liver morphology on CT  Sepsis ([facility])  2/2 atypical PNA  Continue antibiotics as described in \"Pneumonia\"   Pneumonia  Treated for atypical PNA in ICU on cefepime and doxy; last day of Cefepime 5/21  Continue doxy for another 11 doses   WBC wnl, afebrile  Continue to trend CBC and fever curve   Lactic acid tomorrow am  Nonrheumatic aortic valve stenosis  Plan for TAVR 5/29/2025  Encounter for dialysis ([facility])  Nephro following  Dialysis MWF  Palliative care by specialist  Palliative on board  Full code - level 1  Life vest     VTE Pharmacologic Prophylaxis:   High Risk (Score >/= 5) - Pharmacological " DVT Prophylaxis Ordered: apixaban (Eliquis). Sequential Compression Devices Ordered.    Mobility:   Basic Mobility Inpatient Raw Score: 11  JH-HLM Goal: 4: Move to chair/commode  JH-HLM Achieved: 4: Move to chair/commode  JH-HLM Goal NOT achieved. Continue with multidisciplinary rounding and encourage appropriate mobility to improve upon JH-HLM goals.    Patient Centered Rounds: I performed bedside rounds with nursing staff today.   Discussions with Specialists or Other Care Team Provider: cardiology, nephrology, palliative care    Education and Discussions with Family / Patient: Updated  (wife) at bedside.    Current Length of Stay: 4 day(s)  Current Patient Status: Inpatient   Certification Statement: The patient will continue to require additional inpatient hospital stay due to persistent hypotension, cautious restarting of GDMT medications  Discharge Plan: Anticipate discharge in 24-48 hrs to discharge location to be determined pending rehab evaluations.    Code Status: Level 1 - Full Code    Subjective   No overnight events. Patient resting comfortably at time of exam and eating breakfast. Wife present at bedside endorses that patient has significantly improved since admission. Patient denies any new symptomology and inquiring as to when he can be discharged.     Objective :  Temp:  [97.6 °F (36.4 °C)] 97.6 °F (36.4 °C)  HR:  [71-89] 89  BP: ()/(43-58) 102/43  Resp:  [6-30] 26  SpO2:  [72 %-100 %] 94 %    Body mass index is 30.28 kg/m².     Input and Output Summary (last 24 hours):     Intake/Output Summary (Last 24 hours) at 5/21/2025 1101  Last data filed at 5/21/2025 0900  Gross per 24 hour   Intake 426.21 ml   Output --   Net 426.21 ml       Physical Exam  Vitals and nursing note reviewed.   Constitutional:       General: He is not in acute distress.     Appearance: He is well-developed.   HENT:      Head: Normocephalic and atraumatic.     Eyes:      Conjunctiva/sclera: Conjunctivae  normal.       Cardiovascular:      Rate and Rhythm: Normal rate and regular rhythm.      Heart sounds: Murmur heard.   Pulmonary:      Effort: Pulmonary effort is normal. No respiratory distress.   Abdominal:      Palpations: Abdomen is soft.      Tenderness: There is no abdominal tenderness.     Musculoskeletal:         General: No swelling.      Cervical back: Neck supple.     Skin:     General: Skin is warm and dry.      Capillary Refill: Capillary refill takes less than 2 seconds.      Findings: Lesion present.      Comments: Missing toe on R foot     Neurological:      Mental Status: He is alert.     Psychiatric:         Mood and Affect: Mood normal.           Lines/Drains:        Telemetry:  Telemetry Orders (From admission, onward)               24 Hour Telemetry Monitoring  Continuous x 24 Hours (Telem)        Expiring   Question:  Reason for 24 Hour Telemetry  Answer:  Decompensated CHF- and any one of the following: continuous diuretic infusion or total diuretic dose >200 mg daily, associated electrolyte derangement (I.e. K < 3.0), inotropic drip (continuous infusion), hx of ventricular arrhythmia, or new EF < 35%                     Telemetry Reviewed: Normal Sinus Rhythm  Indication for Continued Telemetry Use: Acute CHF on >200 mg lasix/day or equivalent dose or with new reduced EF.                Lab Results: I have reviewed the following results:   Results from last 7 days   Lab Units 05/21/25  0600 05/20/25  0524 05/19/25  0321   WBC Thousand/uL 5.26   < > 9.33   HEMOGLOBIN g/dL 8.2*   < > 8.7*   HEMATOCRIT % 26.3*   < > 27.9*   PLATELETS Thousands/uL 93*   < > 104*   SEGS PCT %  --   --  77*   LYMPHO PCT %  --   --  9*   MONO PCT %  --   --  11   EOS PCT %  --   --  2    < > = values in this interval not displayed.     Results from last 7 days   Lab Units 05/21/25  0600   SODIUM mmol/L 137   POTASSIUM mmol/L 4.8   CHLORIDE mmol/L 97   CO2 mmol/L 28   BUN mg/dL 39*   CREATININE mg/dL 6.59*   ANION  GAP mmol/L 12   CALCIUM mg/dL 8.3*   ALBUMIN g/dL 3.8   TOTAL BILIRUBIN mg/dL 0.87   ALK PHOS U/L 116*   ALT U/L 5*   AST U/L 7*   GLUCOSE RANDOM mg/dL 89     Results from last 7 days   Lab Units 05/21/25  0600   INR  1.34*     Results from last 7 days   Lab Units 05/21/25  0943 05/20/25  1111 05/20/25  0723 05/19/25  0949 05/19/25  0748 05/18/25  2021 05/18/25  1641 05/18/25  1053 05/18/25  0836 05/17/25  0304   POC GLUCOSE mg/dl 116 169* 107 182* 125 154* 102 83 97 153*         Results from last 7 days   Lab Units 05/19/25  0321 05/17/25  0431 05/17/25  0334   LACTIC ACID mmol/L 1.0 2.8* 3.1*   PROCALCITONIN ng/ml  --   --  0.23       Recent Cultures (last 7 days):   Results from last 7 days   Lab Units 05/17/25  0551 05/17/25  0448   BLOOD CULTURE  No Growth at 72 hrs. No Growth at 72 hrs.             Last 24 Hours Medication List:     Current Facility-Administered Medications:     acetaminophen (TYLENOL) oral suspension 650 mg, Q6H PRN    apixaban (ELIQUIS) tablet 2.5 mg, BID    aspirin chewable tablet 81 mg, Daily    atorvastatin (LIPITOR) tablet 40 mg, Daily With Dinner    cefepime (MAXIPIME) 1,000 mg in dextrose 5 % 50 mL IVPB, Q24H, Last Rate: 1,000 mg (05/20/25 1736)    chlorhexidine (PERIDEX) 0.12 % oral rinse 15 mL, Q12H MICH    cyanocobalamin (VITAMIN B-12) tablet 250 mcg, Daily    divalproex sodium (DEPAKOTE SPRINKLE) capsule 250 mg, Q12H MICH    doxycycline hyclate (VIBRAMYCIN) capsule 100 mg, Q12H MICH    [Held by provider] metoprolol succinate (TOPROL-XL) 24 hr tablet 25 mg, BID    midodrine (PROAMATINE) tablet 10 mg, Before Dialysis    midodrine (PROAMATINE) tablet 15 mg, TID AC    prasugrel (EFFIENT) tablet 10 mg, Daily    Administrative Statements   Today, Patient Was Seen By: Aniyah Kaur DO      **Please Note: This note may have been constructed using a voice recognition system.**

## 2025-05-21 NOTE — ASSESSMENT & PLAN NOTE
Patient requiring norepinephrine to maintain maps above 65.  Borderline BP.  Low blood blood pressure postdialysis and fluid removal that day in the setting of severe AS and cardiomyopathy.  Also consideration of sepsis/infection, suspected pneumonia on CT.  Sepsis versus some component of cardiogenic shock     Plan:  Continue midodrine 15 mg 3 times daily  BP currently stable at 102/43  Will try to decrease of midodrine as able   Can give albumin as needed for hypotension but avoid fluids due to low EF and severe AS   Dialysis scheduled for today 5/21  Will consider restarting metoprolol if BP remains stable  PT/OT evaluation

## 2025-05-21 NOTE — PROGRESS NOTES
Progress Note - Nephrology   Name: Asad Galloway 65 y.o. male I MRN: [id number]  Unit/Bed#: S -01 I Date of Admission: 5/17/2025   Date of Service: 5/21/2025 I Hospital Day: 4     Assessment & Plan  ESRD on dialysis ([facility])  Lab Results   Component Value Date    EGFR 8 05/21/2025    EGFR 11 05/20/2025    EGFR 8 05/19/2025    CREATININE 6.59 (H) 05/21/2025    CREATININE 4.86 (H) 05/20/2025    CREATININE 6.11 (H) 05/19/2025   #ESRD on HD MWF:  Dialysis unit/days: [facility] [place] [facility]   Access:AV fistula  EDW 95 kg, unable to achieve dry weight as per family because he drinks too much water.  Fluid restriction less than 1.5 L/day  Reinforced compliance  Clinically improving  Plan for dialysis this afternoon  Anemia  Hemoglobin stable at 8.2  Patient on Mircera as an outpatient  Hypotension  Noncompliant with fluid restriction as an outpatient.  Patient has underlying cardiomyopathy and severe aortic stenosis.  Ultrafiltration as blood pressure allows  Blood pressure currently stable  Continue midodrine 15 mg 3 times daily  History of stroke in adulthood    Mood disorder (HCC)    Absence of toe of right foot (HCC)    Chronic deep vein thrombosis (DVT) of distal vein of right lower extremity (HCC)    Anemia due to chronic kidney disease, on chronic dialysis ([facility])  Lab Results   Component Value Date    EGFR 8 05/21/2025    EGFR 11 05/20/2025    EGFR 8 05/19/2025    CREATININE 6.59 (H) 05/21/2025    CREATININE 4.86 (H) 05/20/2025    CREATININE 6.11 (H) 05/19/2025   -- Mircera as an outpatient hemoglobin stable  Cirrhosis (HCC)    Sepsis ([facility])  -- Management as per critical care  Pneumonia  -- Currently on IV cefepime  Shock ([facility])  -- Off pressors  Goals of care, counseling/discussion  -- Ongoing  Nonrheumatic aortic valve stenosis  -- Patient plan for surgery at [facility]   Encounter for dialysis ([facility])  -- Plan for dialysis this afternoon  Palliative care by specialist      I have reviewed the nephrology recommendations  including assessment and plan, with patient, and we are in agreement with renal plan including the information outlined above. Nephrology service will follow.    Subjective   Brief History of Admission - 65 y.o. man with PMH of ESRD on HD MWF via AV fistula, anemia, hypertension, CHF, ischemic cardiomyopathy present unresponsive. Nephrolgy is consulted for management of ESRD     Patient transferred to the intensive care unit to the medical surgical floor.  No overnight events.    Objective :  Temp:  [97.6 °F (36.4 °C)] 97.6 °F (36.4 °C)  HR:  [71-89] 89  BP: ()/(43-58) 102/43  Resp:  [6-30] 26  SpO2:  [72 %-100 %] 94 %    Current Weight: Weight - Scale: 93 kg (205 lb 0.4 oz)  First Weight: Weight - Scale: 107 kg (235 lb 7.2 oz)  I/O         05/19 0701  05/20 0700 05/20 0701  05/21 0700 05/21 0701  05/22 0700    P.O. 942 0 120    I.V. (mL/kg) 886.1 (9.5) 176.2 (1.9)     NG/GT       IV Piggyback 150 130     Total Intake(mL/kg) 1978.1 (21.3) 306.2 (3.3) 120 (1.3)    Other 2500      Total Output 2500      Net -521.9 +306.2 +120                 Physical Exam  Constitutional:       General: He is not in acute distress.     Appearance: He is well-developed. He is not diaphoretic.   HENT:      Head: Normocephalic and atraumatic.     Eyes:      General: No scleral icterus.     Pupils: Pupils are equal, round, and reactive to light.       Cardiovascular:      Rate and Rhythm: Normal rate and regular rhythm.      Heart sounds: Normal heart sounds. No murmur heard.     No friction rub. No gallop.   Pulmonary:      Effort: Pulmonary effort is normal. No respiratory distress.      Breath sounds: Normal breath sounds. No wheezing or rales.   Chest:      Chest wall: No tenderness.   Abdominal:      General: Bowel sounds are normal. There is no distension.      Palpations: Abdomen is soft.      Tenderness: There is no abdominal tenderness. There is no rebound.     Musculoskeletal:         General: Normal range of motion.       Cervical back: Normal range of motion and neck supple.     Skin:     Findings: No rash.     Neurological:      Mental Status: He is alert and oriented to person, place, and time.         Medications:  Current Medications[1]      Lab Results: I have reviewed the following results:  Results from last 7 days   Lab Units 05/21/25  0600 05/20/25  0524 05/19/25  0321 05/18/25  1139 05/18/25  0432 05/18/25  0024 05/17/25  1850 05/17/25  1700 05/17/25  0828 05/17/25  0502 05/17/25  0334 05/17/25  0305 05/14/25  1137   WBC Thousand/uL 5.26 9.05 9.33  --  6.72  --   --   --   --  6.11 4.96  --  5.29   HEMOGLOBIN g/dL 8.2* 8.6* 8.7* 9.5* 8.5* 8.6* 9.5*   < >  --  9.8* 9.7*  --  9.7*   I STAT HEMOGLOBIN g/dl  --   --   --   --   --   --   --   --   --   --   --  11.2*  --    HEMATOCRIT % 26.3* 27.3* 27.9* 29.6* 27.3* 27.2* 30.2*   < >  --  31.0* 31.1*  --  31.5*   HEMATOCRIT, ISTAT %  --   --   --   --   --   --   --   --   --   --   --  33*  --    PLATELETS Thousands/uL 93* 119* 104*  --  77*  --   --   --   --  95* 91*  --  99*   POTASSIUM mmol/L 4.8 4.2 4.6  --  4.6  --   --   --  3.8 5.2 5.0  --   --    CHLORIDE mmol/L 97 95* 93*  --  94*  --   --   --  100 93* 92*  --   --    CO2 mmol/L 28 30 29  --  31  --   --   --  27 29 28  --   --    CO2, I-STAT mmol/L  --   --   --   --   --   --   --   --   --   --   --  27  --    BUN mg/dL 39* 26* 36*  --  24  --   --   --  30* 32* 32*  --   --    CREATININE mg/dL 6.59* 4.86* 6.11*  --  4.34*  --   --   --  4.31* 4.94* 5.02*  --   --    CALCIUM mg/dL 8.3* 8.2* 8.1*  --  7.9*  --   --   --  6.5* 7.7* 7.8*  --   --    MAGNESIUM mg/dL 2.1 2.0  --   --  2.1  --   --   --  1.7* 2.1 2.1  --   --    PHOSPHORUS mg/dL 4.8* 3.8 4.8*  --  3.8  --   --   --  4.4* 5.5*  --   --   --    ALBUMIN g/dL 3.8 3.8  --   --   --   --   --   --   --  3.8 3.8  --   --    GLUCOSE, ISTAT mg/dl  --   --   --   --   --   --   --   --   --   --   --  152*  --     < > = values in this interval not displayed.  "      Administrative Statements   I have spent a total time of 15 minutes in caring for this patient on the day of the visit/encounter including Counseling / Coordination of care, Documenting in the medical record, Reviewing/placing orders in the medical record (including tests, medications, and/or procedures), and Obtaining or reviewing history  .  Portions of the record may have been created with voice recognition software. Occasional wrong word or \"sound a like\" substitutions may have occurred due to the inherent limitations of voice recognition software. Read the chart carefully and recognize, using context, where substitutions have occurred.If you have any questions, please contact the dictating provider.       [1]   Current Facility-Administered Medications:     acetaminophen (TYLENOL) oral suspension 650 mg, 650 mg, Oral, Q6H PRN, Belén Calle MD, 650 mg at 05/18/25 0645    apixaban (ELIQUIS) tablet 2.5 mg, 2.5 mg, Oral, BID, Belén Calle MD, 2.5 mg at 05/21/25 0942    aspirin chewable tablet 81 mg, 81 mg, Oral, Daily, Belén Calle MD, 81 mg at 05/21/25 0942    atorvastatin (LIPITOR) tablet 40 mg, 40 mg, Oral, Daily With Dinner, Belén Calle MD, 40 mg at 05/20/25 1735    cefepime (MAXIPIME) 1,000 mg in dextrose 5 % 50 mL IVPB, 1,000 mg, Intravenous, Q24H, Belén Calle MD, Last Rate: 100 mL/hr at 05/20/25 1736, 1,000 mg at 05/20/25 1736    chlorhexidine (PERIDEX) 0.12 % oral rinse 15 mL, 15 mL, Mouth/Throat, Q12H MICH, Belén Calle MD, 15 mL at 05/21/25 0942    cyanocobalamin (VITAMIN B-12) tablet 250 mcg, 250 mcg, Oral, Daily, Belén Calle MD, 250 mcg at 05/21/25 0942    divalproex sodium (DEPAKOTE SPRINKLE) capsule 250 mg, 250 mg, Oral, Q12H MICH, Belén Calle MD, 250 mg at 05/21/25 0942    doxycycline hyclate (VIBRAMYCIN) capsule 100 mg, 100 mg, Oral, Q12H MICH, Belén Calle MD, 100 mg at 05/21/25 0942    magnesium sulfate 2 g/50 mL IVPB (premix) 2 g, 2 g, Intravenous, Once, Aniyah Kaur DO    [Held by provider] metoprolol " succinate (TOPROL-XL) 24 hr tablet 25 mg, 25 mg, Oral, BID, Belén Calle MD    midodrine (PROAMATINE) tablet 10 mg, 10 mg, Oral, Before Dialysis, Belén Calle MD, 10 mg at 05/19/25 0834    midodrine (PROAMATINE) tablet 15 mg, 15 mg, Oral, TID AC, Belén Calle MD, 15 mg at 05/21/25 0942    prasugrel (EFFIENT) tablet 10 mg, 10 mg, Oral, Daily, Belén Calle MD, 10 mg at 05/20/25 0919

## 2025-05-21 NOTE — ASSESSMENT & PLAN NOTE
Unspecified mood disorder, patient previously on Depakote. Was discontinued during his previous admission in [facility] due to concerns for thrombocytopenia.   Avoid QTC prolonging medications due to QTC of 550  Can give IV Ativan prn for agitation judiciously

## 2025-05-21 NOTE — ASSESSMENT & PLAN NOTE
Suspected that his presenting episode of unresponsiveness and low BP due to low blood pressure post dialysis and fluid removal, in the setting of severe AS and cardiomyopathy, sepsis   PTA - takes midodrine prior to HD  -Now off norepi gtt  -On midodrine 15 mg TID  -Continue off Entresto and continue holding BB today for low BP

## 2025-05-21 NOTE — OCCUPATIONAL THERAPY NOTE
Occupational Therapy Evaluation     Patient Name: Asad Galloway  Today's Date: 5/21/2025  Problem List  Principal Problem:    Shock ([facility])  Active Problems:    History of stroke in adulthood    Anemia    ESRD on dialysis (HCC)    CAD, multiple vessel    Ischemic cardiomyopathy    Mood disorder (HCC)    Absence of toe of right foot (HCC)    Chronic systolic heart failure (HCC)    PAD (peripheral artery disease) (HCC)    Chronic deep vein thrombosis (DVT) of distal vein of right lower extremity (HCC)    Anemia due to chronic kidney disease, on chronic dialysis (HCC)    Hypotension    Cirrhosis (HCC)    Sepsis ([facility])    Pneumonia    Goals of care, counseling/discussion    Nonrheumatic aortic valve stenosis    Encounter for dialysis ([facility])    Palliative care by specialist    Past Medical History  Past Medical History:   Diagnosis Date    Cerebrovascular accident (CVA) due to thrombosis of left middle cerebral artery ([facility]) 07/29/2018    Chronic kidney disease     Coronary artery disease     Diabetes mellitus ([facility])     not on meds    Dialysis patient ([facility])     M-W-F    Fistula     left upper arm for hemodialyis    GERD (gastroesophageal reflux disease)     History of coronary artery stent placement     x3    Hypercholesteremia     Hyperlipidemia     Hypertension     Infectious viral hepatitis     B as child    Limb alert care status     no BP/IV left arm    Neuropathy     Nonhealing ulcer of heel ([facility]) 04/25/2023    Obesity     Osteomyelitis ([facility])     last assessed 11/4/16-per son not currently    Penetrating foot wound, left, initial encounter 01/19/2022    PVC's (premature ventricular contractions)     sees  Cardio    Stroke ([facility])     last weeof July 2018 [facility]    TIA (transient ischemic attack) 10/28/2018    Ulcer of left heel, limited to breakdown of skin ([facility]) 06/13/2024    Wears dentures     Wears glasses      Past Surgical History  Past Surgical History:   Procedure Laterality Date     "ABDOMINAL SURGERY      CARDIAC CATHETERIZATION N/A 05/02/2022    Procedure: Cardiac Coronary Angiogram;  Surgeon: Sam Davis MD;  [facility];  Service: Cardiology    CARDIAC CATHETERIZATION N/A 05/02/2022    Procedure: Cardiac pci;  Surgeon: Sam Davis MD;  [facility];  Service: Cardiology    CARDIAC CATHETERIZATION  02/01/2023    Procedure: Cardiac catheterization;  Surgeon: Sam Davis MD;  [facility]: [facility];  Service: Cardiology    CARDIAC CATHETERIZATION N/A 02/01/2023    Procedure: Cardiac pci;  Surgeon: Sam Davis MD;  [facility];  Service: Cardiology    CARDIAC CATHETERIZATION N/A 02/01/2023    Procedure: Cardiac Coronary Angiogram;  Surgeon: Sam Davis MD;  [facility];  Service: Cardiology    CARDIAC CATHETERIZATION N/A 02/01/2023    Procedure: Cardiac other-IVUS;  Surgeon: Sam Davis MD;  [facility]: [facility];  Service: Cardiology    CHOLECYSTECTOMY      Percutaneous    COLONOSCOPY      CORONARY ANGIOPLASTY WITH STENT PLACEMENT      CYSTOSCOPY      HEMODIALYSIS ADULT  1/22/2024    HEMODIALYSIS ADULT  01/24/2024    HEMODIALYSIS ADULT  5/19/2025    IR LOWER EXTREMITY ANGIOGRAM  9/29/2023    IR LOWER EXTREMITY ANGIOGRAM  02/26/2024    OTHER SURGICAL HISTORY      \"stimulator to control bowel movements\"    LA ESOPHAGOGASTRODUODENOSCOPY TRANSORAL DIAGNOSTIC N/A 09/27/2016    Procedure: ESOPHAGOGASTRODUODENOSCOPY (EGD);  Surgeon: Adele Rowe MD;  Location: [facility] GI LAB;  Service: Gastroenterology    LA LAPAROSCOPY SURG CHOLECYSTECTOMY N/A 02/29/2016    Procedure: LAPAROSCOPIC CHOLECYSTECTOMY ;  Surgeon: Cliff Roman DO;  Location: [facility] Main OR;  Service: General    LA SLCTV CATHJ 3RD+ ORD SLCTV ABDL PEL/LXTR BRNCH Left 9/29/2023    Procedure: Left leg angiogram with intervention;  Surgeon: Michelle Galvan MD;  Location: [facility];  Service: Vascular    LA SLCTV CATHJ 3RD+ ORD SLCTV ABDL PEL/LXTR BRNCH " "Left 2024    Procedure: Left leg angiogram antegrade access, left popliteal angioplasty;  Surgeon: Michelle Galvan MD;  Location: [facility];  Service: Vascular    ROTATOR CUFF REPAIR Right     SPINAL CORD STIMULATOR IMPLANT      \"Medtronic interstim model # 3058- in lower back to control bowel movements-currently turned off-battery is dead\"    TOE AMPUTATION Right 10/28/2016    Procedure: 3RD TOE AMPUTATION ;  Surgeon: Anjel Salas DPM;  Location: [facility];  Service:         Patient's identity confirmed via 2 patient identifiers (full name and ) at start of session        25 0815   OT Last Visit   OT Visit Date 25   Note Type   Note type Evaluation   Pain Assessment   Pain Assessment Tool 0-10   Pain Score 10 - Worst Possible Pain   Pain Location/Orientation Location: Generalized   Pain Onset/Description Frequency: Intermittent   Effect of Pain on Daily Activities limits comfort, activity tolerance, speed of ADLs/mobility   Patient's Stated Pain Goal No pain   Hospital Pain Intervention(s) Repositioned;Ambulation/increased activity;Emotional support   Restrictions/Precautions   Weight Bearing Precautions Per Order No   Other Precautions Cognitive;Chair Alarm;Bed Alarm;Fall Risk;Pain   Home Living   Type of Home House  (bi-level)   Home Layout Multi-level;Stairs to enter with rails;Performs ADLs on one level;Able to live on main level with bedroom/bathroom  (5 RAFAT down + 7 RAFAT up to living area)   Bathroom Shower/Tub Walk-in shower   Bathroom Toilet Raised   Bathroom Equipment Built-in shower seat;Grab bars in shower;Grab bars around toilet   Bathroom Accessibility Accessible   Home Equipment Walker  (bedrail)   Prior Function   Level of [place] Needs assistance with ADLs;Independent with functional mobility;Needs assistance with IADLS   Lives With Spouse   Receives Help From Family   IADLs Family/Friend/Other provides transportation;Family/Friend/Other provides " "meals;Family/Friend/Other provides medication management   Falls in the last 6 months 1 to 4  (at least 1 fall PTA per EMR, 2 additional falls reported last admission 4/22)   Vocational Retired  ()   Comments Pt is a questionable historian. Wife at bedside reports \"I do everything for him\". Pt mod (I) for mobility w/ RW but requires A for ADLs/all IADLs. HAs 24 hr care and is never alone   Lifestyle   Autonomy Pt lives w/ spouse in a bi-level house and requires A for ADLs baseline, mod (I) RW, (+) falls, (-)    Reciprocal Relationships Supportive spouse, family   Service to Others Retired    Intrinsic Gratification Pt enjoys playing Clipboard w/ cards   General   Additional Pertinent History Pt admit found unresponsive by family tonight. Patient has a life vest. Recent hx of fall few days ago w/ headstrike, did not receive treatment. Intubated on arrival, GSC 3. Initially requiring pressors to maintain MAPs (baseline MAPs are around 60s). Found to be in shock w/ pneumonia and spesis. Now extubated on RA. PMH includes: ESRD on HD, CAD, acute hypoxic respiratory failure, ischemic cardiomypopathy, aortic stenosis, acute embolism and thrombosis of R peroneal vein, DM2, CKD, HFrEF, cirrhosis, hx of CVA in 2018 c R residual deficits, mood disorder c emotional lability, PVD, ambulatory dysfunction, thromboycytopenia   Family/Caregiver Present Yes  (Spouse)   Subjective   Subjective \"Where is my breakfast?\"   ADL   Where Assessed Edge of bed   Eating Assistance 5  Supervision/Setup   Eating Deficit Setup;Beverage management  (wife automatically assisting w/ feeding tasks, pt able to perform)   Grooming Assistance 5  Supervision/Setup   UB Bathing Assistance 4  Minimal Assistance   LB Bathing Assistance 2  Maximal Assistance   UB Dressing Assistance 4  Minimal Assistance   LB Dressing Assistance 2  Maximal Assistance   LB Dressing Deficit Setup;Steadying;Requires assistive device for " steadying;Verbal cueing;Supervision/safety;Increased time to complete;Thread RLE into pants;Thread LLE into pants;Pull up over hips  (sitting EOB, despite encouragement pt declines attempting to don pants, max A to thread over BLEs, BUE support on RW w/ A to pullover hips)   Toileting Assistance  2  Maximal Assistance   Bed Mobility   Supine to Sit 3  Moderate assistance   Additional items Assist x 1;HOB elevated;Bedrails;Increased time required;Verbal cues;LE management   Additional Comments Able to sit EOB w/ S; OOB to recliner at end of session   Transfers   Sit to Stand 3  Moderate assistance   Additional items Assist x 1;Increased time required;Verbal cues   Stand to Sit 3  Moderate assistance   Additional items Assist x 1;Increased time required;Verbal cues   Additional Comments to RW, VC for hand placement, pt pulls up from RW. Min A steadying in stance.   Functional Mobility   Functional Mobility 3  Moderate assistance   Additional Comments Few steps from EOB>recliner w/ RW and mod A x 1. Noted to begin sitting prior to safe proximity to recliner, (+) assist for safe descent. Declines further mobility trials at this time   Additional items Rolling walker   Balance   Static Sitting Fair +   Dynamic Sitting Fair   Static Standing Poor +   Dynamic Standing Poor   Activity Tolerance   Activity Tolerance Patient limited by fatigue;Patient limited by pain  (cognition)   Medical Staff Made Aware Care coordinated w/ PT MIKE [place] updated   Nurse Made Aware yes, RN Ruben arevalo to see pt and updated   RUE Assessment   RUE Assessment WFL  (grossly WFL; residual deficits from previous CVA; grossly 3-/5 MMT obeserved during functional tasks)   LUE Assessment   LUE Assessment WFL  (grossly 3+/5 MMT observed during functional tasks)   Hand Function   Gross Motor Coordination Impaired  (bradykinetic, hand to mouth grossly intact)   Fine Motor Coordination Impaired  (able to grasp utensil, (+) time/effort)   Sensation    Light Touch No apparent deficits   Vision-Basic Assessment   Current Vision Wears glasses all the time  (not present in hospital)   Cognition   Overall Cognitive Status Impaired   Arousal/Participation Alert;Cooperative   Attention Attends with cues to redirect   Orientation Level Oriented to person;Disoriented to time;Disoriented to situation;Disoriented to place  (Reports April 2025; able to select hospital from multiple choice; does not recall why he is in hospital)   Memory Decreased short term memory;Decreased recall of recent events;Decreased recall of precautions   Following Commands Follows one step commands with increased time or repetition   Comments Pleasantly confused, VC for pacing, safety, problem solving. Questionable learned dependency, distractible/perseverative on breakfast. Poor insight. Wife present to provide hx and PLOF.   Assessment   Limitation Decreased ADL status;Decreased Safe judgement during ADL;Decreased cognition;Decreased endurance;Decreased self-care trans;Decreased high-level ADLs  (pain, balance, fxnl mobility, act jose miguel, fxnl reach, standing jose miguel, and strength, attention to task, safety awareness, insight, orientation, problem solving, and learning new tasks, emotional regulation, response time)   Prognosis Good   Assessment Pt is a 65 y.o. male seen for OT evaluation s/p admit to [facility] on 5/17/2025 w/Shock (HCC). Prior to admission, pt was living with spouse in a bi-level house, (A) with ADLs/IADLs, mod (I) w/ RW, (+) falls, (-) . Personal and environmental factors affecting patient at time of evaluation include current habits and behavioral patterns, lifestyle patterns, limited social support, inaccessible home environment, inaccessible bathroom environment, and difficulty completing ADLs. Personal factors supporting patient at time of evaluation include age, supportive spouse who provides 24 hr care, and (A) with all ADLs. Based upon this evaluation, pt is  functioning below baseline. Pt will benefit from continued skilled inpatient OT to maximize safety, level of independence and overall performance in ADLs, functional transfers, functional mobility to return to functional baseline/highest level of function.   Goals   Patient Goals to eat breakfast   LTG Time Frame 10-14   Long Term Goal #1 see goals below   Plan   Treatment Interventions ADL retraining;Functional transfer training;Endurance training;Cognitive reorientation;Patient/family training;Equipment evaluation/education;Neuromuscular reeducation;Compensatory technique education;Continued evaluation;Energy conservation;Activityengagement   Goal Expiration Date 05/31/25   OT Treatment Day 0   OT Frequency 3-5x/wk   Discharge Recommendation   Rehab Resource Intensity Level, OT II (Moderate Resource Intensity)   Equipment Recommended Bedside commode  (w/c)   Commode Type Standard   AM-PAC Daily Activity Inpatient   Lower Body Dressing 2   Bathing 2   Toileting 2   Upper Body Dressing 3   Grooming 3   Eating 3   Daily Activity Raw Score 15   Daily Activity Standardized Score (Calc for Raw Score >=11) 34.69   AM-PAC Applied Cognition Inpatient   Following a Speech/Presentation 2   Understanding Ordinary Conversation 3   Taking Medications 2   Remembering Where Things Are Placed or Put Away 3   Remembering List of 4-5 Errands 1   Taking Care of Complicated Tasks 1   Applied Cognition Raw Score 12   Applied Cognition Standardized Score 28.82   End of Consult   Patient Position at End of Consult Bedside chair;Bed/Chair alarm activated;All needs within reach   Nurse Communication Nurse aware of consult     GOALS:    *Goals established to promote patient goal of to eat breakfast:      *Patient will perform grooming tasks standing at sink with (S) in order to increase overall independence     *UB ADL with (S) for inc'd independence with self care and decreased caregiver burden    *LB ADL with Min (A) for inc'd  independence with self care and decreased caregiver burden    *Toileting with Min (A) for clothing management and hygiene to increase hygiene/thoroughness in order to reduce caregiver burden    *ADL transfers with (S) for inc'd independence with ADLs/purposeful tasks    *Bed mobility- (S) for inc'd independence to manage own comfort and initiate EOB & OOB purposeful tasks    *Patient will increase functional mobility to and from bathroom with rolling walker with supervision to increase independence with toileting    *Patient will increase OOB/sitting tolerance to 2-4 hours per day to increase participation in self-care and leisure tasks with no s/s of exertion.     *Increase stand tolerance x3-5 m for inc'd tolerance with standing purposeful tasks including grooming/self-care    *Patient will improve functional activity tolerance to 20 minutes of sustained functional tasks to increase participation in basic self-care and decrease assistance level.     *Patient will increase static/dynamic standing balance to fair/fair- to maximize safety/performance of higher level ADLS/purposeful tasks    *Caregiver will demonstrate good body mechanics and safe technique when assisting pt during functional ADLs/transfers to maximize safety upon DC.    The patient's raw score on the -PAC Daily Activity Inpatient Short Form is 15. A raw score of less than 19 suggests the patient may benefit from discharge to post-acute rehabilitation services. Please also refer to the recommendation of the Occupational Therapist for safe discharge planning.     Pt seen as a co-eval with PT due to the patient's co-morbidities and clinically unstable presentation indicated by chart review. During session, pt benefited from two skilled therapists due to extensive physical assistance to achieve transitional movements and pt's decreased activity tolerance.      Sylvia Gonzales, TRISTAN, OTR/L    [place] License [id number]  [place] License [id number]

## 2025-05-21 NOTE — ASSESSMENT & PLAN NOTE
Wt Readings from Last 3 Encounters:   05/20/25 93 kg (205 lb 0.4 oz)   05/02/25 94.3 kg (208 lb)   05/01/25 94.8 kg (209 lb)     Cardiology following  Most recent EF 25% with evidence of severe aortic stenosis   Resume GDMT if blood pressures allow  Cardiology following

## 2025-05-21 NOTE — ASSESSMENT & PLAN NOTE
Treated for atypical PNA in ICU on cefepime and doxy; last day of Cefepime 5/21  Continue doxy for another 11 doses   WBC wnl, afebrile  Continue to trend CBC and fever curve   Lactic acid tomorrow am

## 2025-05-21 NOTE — PLAN OF CARE
Problem: PHYSICAL THERAPY ADULT  Goal: Performs mobility at highest level of function for planned discharge setting.  See evaluation for individualized goals.  Description: Treatment/Interventions: Functional transfer training, LE strengthening/ROM, Therapeutic exercise, Endurance training, Patient/family training, Cognitive reorientation, Bed mobility, Equipment eval/education, Gait training, Spoke to nursing, Spoke to case management, OT, Elevations  Equipment Recommended:  (pt has RW)       See flowsheet documentation for full assessment, interventions and recommendations.  Note: Prognosis: Fair  Problem List: Decreased strength, Decreased endurance, Impaired balance, Decreased mobility, Decreased cognition, Impaired judgement, Decreased safety awareness, Obesity, Decreased skin integrity  Assessment: Pt is a 65 y.o. male seen for PT evaluation s/p admit to [facility] on 5/17/2025. Pt was admitted with a primary dx of: shock, AMS, anemia of chronic disease, unresponsive, acute respiratory failure with hypoxia, acute encephalopathy, hypothermia, heart failure with reduced ejection fraction, presence of external cardiac defibrillator.  PT now consulted for assessment of mobility and d/c needs. Pt with  PT eval and treat  orders.  Pts current comorbidities and personal factors effecting treatment include: BMI, CVA, CKD, CAD, DM, . Pts current clinical presentation is Unstable/Unpredictable (high complexity) due to Ongoing medical management for primary dx, Decreased activity tolerance compared to baseline, Fall risk, Increased assistance needed from caregiver at current time, Ongoing telemetry monitoring, Cog status. Prior to admission, pt was independent with use of RW. Upon evaluation, pt currently is requiring ModA for bed mobility; ModA for transfers and ModA for ambulation 4 ft w/ RW. Pt presents at PT eval functioning below baseline and currently w/ overall mobility deficits 2* to: BLE weakness,  impaired balance, gait deviations, pain, decreased activity tolerance compared to baseline, decreased functional mobility tolerance compared to baseline, decreased safety awareness, impaired judgement, fall risk, decreased skin integrity, decreased cognition. Pt currently at a fall risk 2* to impairments listed above.  Pt will continue to benefit from skilled acute PT interventions to address stated impairments; to maximize functional mobility; for ongoing pt/ family training; and DME needs. At conclusion of PT session chair alarm engaged, all needs in reach, RN notified of session findings/recommendations, and pt returned back in recliner chair with phone and call bell within reach. Pt denies any further questions at this time. Recommend Level II (Moderate Resource Intensity)  upon hospital D/C.        Rehab Resource Intensity Level, PT: II (Moderate Resource Intensity)    See flowsheet documentation for full assessment.

## 2025-05-22 ENCOUNTER — APPOINTMENT (INPATIENT)
Dept: RADIOLOGY | Facility: HOSPITAL | Age: 65
DRG: 208 | End: 2025-05-22
Payer: MEDICARE

## 2025-05-22 ENCOUNTER — TELEPHONE (OUTPATIENT)
Age: 65
End: 2025-05-22

## 2025-05-22 ENCOUNTER — APPOINTMENT (OUTPATIENT)
Facility: CLINIC | Age: 65
End: 2025-05-22
Payer: MEDICARE

## 2025-05-22 PROBLEM — L98.9 FINGER LESION: Status: ACTIVE | Noted: 2025-05-22

## 2025-05-22 LAB
ANION GAP SERPL CALCULATED.3IONS-SCNC: 12 MMOL/L (ref 4–13)
BACTERIA BLD CULT: NORMAL
BACTERIA BLD CULT: NORMAL
BASE EX.OXY STD BLDV CALC-SCNC: 92.1 % (ref 60–80)
BASE EXCESS BLDV CALC-SCNC: 5.7 MMOL/L
BUN SERPL-MCNC: 31 MG/DL (ref 5–25)
CALCIUM SERPL-MCNC: 8.1 MG/DL (ref 8.4–10.2)
CHLORIDE SERPL-SCNC: 97 MMOL/L (ref 96–108)
CO2 SERPL-SCNC: 28 MMOL/L (ref 21–32)
CREAT SERPL-MCNC: 5.43 MG/DL (ref 0.6–1.3)
ERYTHROCYTE [DISTWIDTH] IN BLOOD BY AUTOMATED COUNT: 16.9 % (ref 11.6–15.1)
GFR SERPL CREATININE-BSD FRML MDRD: 10 ML/MIN/1.73SQ M
GLUCOSE SERPL-MCNC: 110 MG/DL (ref 65–140)
GLUCOSE SERPL-MCNC: 118 MG/DL (ref 65–140)
GLUCOSE SERPL-MCNC: 118 MG/DL (ref 65–140)
GLUCOSE SERPL-MCNC: 130 MG/DL (ref 65–140)
HCO3 BLDV-SCNC: 27.8 MMOL/L (ref 24–30)
HCT VFR BLD AUTO: 26.3 % (ref 36.5–49.3)
HGB BLD-MCNC: 8.1 G/DL (ref 12–17)
LACTATE SERPL-SCNC: 1.3 MMOL/L (ref 0.5–2)
MAGNESIUM SERPL-MCNC: 2 MG/DL (ref 1.9–2.7)
MCH RBC QN AUTO: 31.2 PG (ref 26.8–34.3)
MCHC RBC AUTO-ENTMCNC: 30.8 G/DL (ref 31.4–37.4)
MCV RBC AUTO: 101 FL (ref 82–98)
O2 CT BLDV-SCNC: 12.2 ML/DL
PCO2 BLDV: 31.2 MM HG (ref 42–50)
PH BLDV: 7.57 [PH] (ref 7.3–7.4)
PLATELET # BLD AUTO: 82 THOUSANDS/UL (ref 149–390)
PMV BLD AUTO: 11 FL (ref 8.9–12.7)
PO2 BLDV: 144.3 MM HG (ref 35–45)
POTASSIUM SERPL-SCNC: 4.3 MMOL/L (ref 3.5–5.3)
RBC # BLD AUTO: 2.6 MILLION/UL (ref 3.88–5.62)
SODIUM SERPL-SCNC: 137 MMOL/L (ref 135–147)
WBC # BLD AUTO: 4.06 THOUSAND/UL (ref 4.31–10.16)

## 2025-05-22 PROCEDURE — 85027 COMPLETE CBC AUTOMATED: CPT

## 2025-05-22 PROCEDURE — 83735 ASSAY OF MAGNESIUM: CPT

## 2025-05-22 PROCEDURE — 83605 ASSAY OF LACTIC ACID: CPT

## 2025-05-22 PROCEDURE — 97116 GAIT TRAINING THERAPY: CPT

## 2025-05-22 PROCEDURE — 73130 X-RAY EXAM OF HAND: CPT

## 2025-05-22 PROCEDURE — 80048 BASIC METABOLIC PNL TOTAL CA: CPT

## 2025-05-22 PROCEDURE — 82948 REAGENT STRIP/BLOOD GLUCOSE: CPT

## 2025-05-22 PROCEDURE — 99232 SBSQ HOSP IP/OBS MODERATE 35: CPT | Performed by: INTERNAL MEDICINE

## 2025-05-22 PROCEDURE — 97530 THERAPEUTIC ACTIVITIES: CPT

## 2025-05-22 PROCEDURE — 82805 BLOOD GASES W/O2 SATURATION: CPT

## 2025-05-22 RX ORDER — MAGNESIUM SULFATE HEPTAHYDRATE 40 MG/ML
2 INJECTION, SOLUTION INTRAVENOUS ONCE
Status: DISCONTINUED | OUTPATIENT
Start: 2025-05-22 | End: 2025-05-23 | Stop reason: HOSPADM

## 2025-05-22 RX ORDER — POLYETHYLENE GLYCOL 3350 17 G/17G
17 POWDER, FOR SOLUTION ORAL DAILY
Status: DISCONTINUED | OUTPATIENT
Start: 2025-05-22 | End: 2025-05-23 | Stop reason: HOSPADM

## 2025-05-22 RX ORDER — AMOXICILLIN 250 MG
2 CAPSULE ORAL 2 TIMES DAILY
Status: DISCONTINUED | OUTPATIENT
Start: 2025-05-22 | End: 2025-05-23 | Stop reason: HOSPADM

## 2025-05-22 RX ADMIN — MIDODRINE HYDROCHLORIDE 15 MG: 5 TABLET ORAL at 11:30

## 2025-05-22 RX ADMIN — APIXABAN 2.5 MG: 2.5 TABLET, FILM COATED ORAL at 19:42

## 2025-05-22 RX ADMIN — MIDODRINE HYDROCHLORIDE 15 MG: 5 TABLET ORAL at 06:10

## 2025-05-22 RX ADMIN — DOXYCYCLINE 100 MG: 100 CAPSULE ORAL at 08:40

## 2025-05-22 RX ADMIN — APIXABAN 2.5 MG: 2.5 TABLET, FILM COATED ORAL at 08:40

## 2025-05-22 RX ADMIN — Medication 3 MG: at 19:42

## 2025-05-22 RX ADMIN — ASPIRIN 81 MG: 81 TABLET, CHEWABLE ORAL at 08:40

## 2025-05-22 RX ADMIN — MIDODRINE HYDROCHLORIDE 15 MG: 5 TABLET ORAL at 19:42

## 2025-05-22 RX ADMIN — DOXYCYCLINE 100 MG: 100 CAPSULE ORAL at 19:42

## 2025-05-22 RX ADMIN — PRASUGREL 10 MG: 10 TABLET, FILM COATED ORAL at 11:30

## 2025-05-22 RX ADMIN — POLYETHYLENE GLYCOL 3350 17 G: 17 POWDER, FOR SOLUTION ORAL at 14:30

## 2025-05-22 NOTE — ASSESSMENT & PLAN NOTE
Noncompliant with fluid restriction as an outpatient.  Patient has underlying cardiomyopathy and severe aortic stenosis.  Ultrafiltration as blood pressure allows  Chronically low  Continue midodrine 15 mg 3 times daily

## 2025-05-22 NOTE — ASSESSMENT & PLAN NOTE
Lab Results   Component Value Date    EGFR 10 05/22/2025    EGFR 8 05/21/2025    EGFR 11 05/20/2025    CREATININE 5.43 (H) 05/22/2025    CREATININE 6.59 (H) 05/21/2025    CREATININE 4.86 (H) 05/20/2025

## 2025-05-22 NOTE — ASSESSMENT & PLAN NOTE
Lab Results   Component Value Date    EGFR 10 05/22/2025    EGFR 8 05/21/2025    EGFR 11 05/20/2025    CREATININE 5.43 (H) 05/22/2025    CREATININE 6.59 (H) 05/21/2025    CREATININE 4.86 (H) 05/20/2025   -- Mircera as an outpatient hemoglobin stable

## 2025-05-22 NOTE — ASSESSMENT & PLAN NOTE
Treated for atypical PNA in ICU on cefepime and doxy; last day of Cefepime 5/21  Continue doxy for another 11 doses   WBC wnl, afebrile  Continue to trend CBC and fever curve   Lactic acid WNL at 1.3

## 2025-05-22 NOTE — ASSESSMENT & PLAN NOTE
Patient requiring norepinephrine to maintain maps above 65.  Borderline BP.  Low blood blood pressure postdialysis and fluid removal that day in the setting of severe AS and cardiomyopathy.  Also consideration of sepsis/infection, suspected pneumonia on CT.  Sepsis versus some component of cardiogenic shock     Plan:  Continue midodrine 15 mg 3 times daily  BP currently stable at 102/43  Will try to decrease of midodrine as able   Can give albumin as needed for hypotension but avoid fluids due to low EF and severe AS   Dialysis scheduled for Friday 5/23  Will consider restarting metoprolol if BP remains stable  PT/OT evaluation

## 2025-05-22 NOTE — ASSESSMENT & PLAN NOTE
Plan for TAVR 5/29/2025  Son requested to speak with cardiology regarding decreasing midodrine prior to procedure

## 2025-05-22 NOTE — ASSESSMENT & PLAN NOTE
Wt Readings from Last 3 Encounters:   05/22/25 91.8 kg (202 lb 6.1 oz)   05/02/25 94.3 kg (208 lb)   05/01/25 94.8 kg (209 lb)     Cardiology following  Most recent EF 25% with evidence of severe aortic stenosis   Resume GDMT if blood pressures allow  Cardiology following

## 2025-05-22 NOTE — PHYSICAL THERAPY NOTE
PHYSICAL THERAPY TREATMENT  DATE: 05/22/25  TIME: 3465-0174    NAME:  Asad Galloway  AGE:   65 y.o.  Mrn:   [id number]  Length Of Stay: 5    ADMIT DX:  Altered mental status [R41.82]  Anemia of chronic disease [D63.8]  Unresponsive [R41.89]  Acute respiratory failure with hypoxia (HCC) [J96.01]  Acute encephalopathy [G93.40]  Hypothermia, initial encounter [T68.XXXA]  HFrEF (heart failure with reduced ejection fraction) (HCC) [I50.20]  Presence of external cardiac defibrillator [Z95.810]    Past Medical History[1]  Past Surgical History[2]    Performed at least 2 patient identifiers during session: Name, ID bracelet, and Epic photo     05/22/25 1550   PT Last Visit   PT Visit Date 05/22/25   Note Type   Note Type Treatment   Pain Assessment   Pain Assessment Tool FLACC   Pain Location/Orientation Location: Generalized   Pain Onset/Description Onset: Ongoing;Descriptor: Sore   Effect of Pain on Daily Activities limits comfort and tolerance of functional mobility   Patient's Stated Pain Goal No pain   Hospital Pain Intervention(s) Repositioned;Ambulation/increased activity;Emotional support   Multiple Pain Sites No   Pain Rating: FLACC (Rest) - Face 0   Pain Rating: FLACC (Rest) - Legs 0   Pain Rating: FLACC (Rest) - Activity 0   Pain Rating: FLACC (Rest) - Cry 0   Pain Rating: FLACC (Rest) - Consolability 0   Score: FLACC (Rest) 0   Pain Rating: FLACC (Activity) - Face 1   Pain Rating: FLACC (Activity) - Legs 0   Pain Rating: FLACC (Activity) - Activity 1   Pain Rating: FLACC (Activity) - Cry 1   Pain Rating: FLACC (Activity) - Consolability 1   Score: FLACC (Activity) 4   Restrictions/Precautions   Weight Bearing Precautions Per Order No   Other Precautions Cognitive;Chair Alarm;Bed Alarm;Impulsive;Multiple lines;Telemetry;O2;Fall Risk;Pain   General   Chart Reviewed Yes   Response to Previous Treatment Patient reporting fatigue but able to participate.   Family/Caregiver Present Yes  (spouse present t/o  "session)   Cognition   Overall Cognitive Status Impaired   Arousal/Participation Cooperative   Attention Attends with cues to redirect   Orientation Level Oriented to person;Oriented to place;Disoriented to situation   Memory Decreased short term memory;Decreased recall of recent events;Decreased recall of precautions   Following Commands Follows one step commands with increased time or repetition   Comments Pt with overall flat affect, decreased engagement, increased time and cues for processing and initiation of mobility vs tasks.   Subjective   Subjective \"I want to die!\"  (Per wife, pt has been making comments like these for a long time, not something new.)   Bed Mobility   Additional Comments Bed mobility NT on this date as pt presents and was left seated OOB in recliner chair with alarm engaged.   Transfers   Sit to Stand 3  Moderate assistance   Additional items Assist x 1;Armrests;Increased time required;Verbal cues  (RW; cues for hand placement for optimal mechanics and safety)   Stand to Sit 4  Minimal assistance   Additional items Assist x 1;Increased time required;Verbal cues  (RW; cues for hand placement for safe and controlled descent to surface)   Stand pivot 4  Minimal assistance   Additional items Assist x 1;Increased time required;Verbal cues  (RW; cues for RW management, surface proximity, and body positioning during approach to target surface)   Toilet transfer 2  Maximal assistance   Additional items Assist x 1;Increased time required;Verbal cues;Standard toilet  (RW; R GB; cues for body mechanics; x2 reps of toilet transfers)   Ambulation/Elevation   Gait pattern Wide JOSE DAVID;Decreased foot clearance;Short stride;Excessively slow;Decreased hip extension;Decreased heel strike;Decreased toe off   Gait Assistance 3  Moderate assist   Additional items Assist x 1;Verbal cues;Tactile cues   Assistive Device Bariatric Rolling walker   Distance 20ft x1 + 10ft x1  (second trial limited due to onset of O2 " desat, and dizziness)   Stair Management Assistance Not tested   Ambulation/Elevation Additional Comments Pt needing extended seated rest break and emotional support during functional rest break. Pt with emotional lability during session; increased agitation and frustration when he started feeling poorly.   Balance   Static Sitting Good   Dynamic Sitting Fair +   Static Standing Fair -  (w/ RW)   Dynamic Standing Poor +  (w/ RW)   Ambulatory Poor +  (w/ RW)   Endurance Deficit   Endurance Deficit Yes   Activity Tolerance   Activity Tolerance Patient limited by fatigue;Patient limited by pain;Other (Comment)  (O2 desat)   Medical Staff Made Aware Spoke with THIERNO Colon pre/post session   Assessment   Prognosis Fair   Problem List Decreased strength;Decreased range of motion;Decreased endurance;Impaired balance;Decreased mobility;Decreased cognition;Impaired judgement;Decreased safety awareness;Decreased skin integrity;Pain   Assessment Pt seen for PT treatment 5/22/2025 with focus on: gait training and therapeutic activity, with intervention focusing on goal of increased independence and safety with functional mobility. Pt presents seated OOB in recliner chair, is agreeable to participate in session, requests utilization of bathroom. During session, pt requires MODA for STS transfers from recliner chair, MODA + RW for ambulation 20ft into bathroom, MAXA for STS transfers from toilet x2 trials (dependent in hygiene care and lower body dressing/management), and MODA for ambulation of 10ft w/ RW (limited due to O2 desat and c/o dizziness, requiring chair brought closer to pt for safety). Overall pt continues to perform below their baseline level of functional mobility due to pain, weakness, impaired balance, decreased endurance, impaired gait mechanics, varying behaviors and emotional status. Pt continues to most appropriately benefit from Level II (moderate PT intensity) resources upon d/c from the acute care setting.  Continue skilled PT POC as able and appropriate in order to progress towards therapy goals and maximize independence with functional mobility. Next session, plan for intervention of increased ambulation training with RW for increased distance as able and appropriate.   Barriers to Discharge Decreased caregiver support;Inaccessible home environment   Goals   Patient Goals no rehab goals stated on this date   STG Expiration Date 05/31/25   Short Term Goal #1 In 10 days pt will be able to: 1. Demonstrate ability to perform all aspects of bed mobility independently to improve functional safety.  2. Perform functional transfers with RW and supervision to facilitate safe return to previous living environment.  3.  Ambulate at least 75 ft with RW and supervision with stable vitals to improve safety with household distances and reduce fall risk.  4. Improve LE strength grades by 1 to increase ease of functional mobility with transfers and gait. 5. Pt will demonstrate improved balance by one grade in order to decrease risk of falls.   PT Treatment Day 1   Plan   Treatment/Interventions Functional transfer training;LE strengthening/ROM;Therapeutic exercise;Endurance training;Cognitive reorientation;Patient/family training;Equipment eval/education;Bed mobility;Gait training;Spoke to nursing;Spoke to case management   Progress Slow progress, decreased activity tolerance   PT Frequency 3-5x/wk   Discharge Recommendation   Rehab Resource Intensity Level, PT II (Moderate Resource Intensity)   AM-PAC Basic Mobility Inpatient   Turning in Flat Bed Without Bedrails 2   Lying on Back to Sitting on Edge of Flat Bed Without Bedrails 2   Moving Bed to Chair 2   Standing Up From Chair Using Arms 2   Walk in Room 3   Climb 3-5 Stairs With Railing 1   Basic Mobility Inpatient Raw Score 12   Basic Mobility Standardized Score 32.23   [facility] Highest Level Of Mobility   -[facility] Goal 4: Move to chair/commode   -[facility] Achieved 7: Walk 25  feet or more   Education   Education Provided Mobility training;Assistive device   Patient Explanation/teachback used;Reinforcement needed   End of Consult   Patient Position at End of Consult Bedside chair;Bed/Chair alarm activated;All needs within reach     Based on patient's [facility] Highest Level of Mobility scores today, patient currently has a goal of -[facility] Levels: 7: WALK 25 FEET OR MORE, to be completed with RN staffing each shift, in order to improve overall activity tolerance and mobility, combat hospital related deconditioning, and maximize outcomes for d/c from the acute care setting.     The patient's AM-PAC Basic Mobility Inpatient Short Form Raw Score is 11. A Raw score of less than or equal to 16 suggests the patient may benefit from discharge to post-acute rehabilitation services. Please also refer to the recommendation of the Physical Therapist for safe discharge planning.      Tiffany Ortiz PT, DPT   Available via [employer]  NPI # [id number]  [place] License - [id number]  5/22/2025        [1]   Past Medical History:  Diagnosis Date    Cerebrovascular accident (CVA) due to thrombosis of left middle cerebral artery (HCC) 07/29/2018    Chronic kidney disease     Coronary artery disease     Diabetes mellitus (HCC)     not on meds    Dialysis patient ([facility])     M-W-F    Fistula     left upper arm for hemodialyis    GERD (gastroesophageal reflux disease)     History of coronary artery stent placement     x3    Hypercholesteremia     Hyperlipidemia     Hypertension     Infectious viral hepatitis     B as child    Limb alert care status     no BP/IV left arm    Neuropathy     Nonhealing ulcer of heel (HCC) 04/25/2023    Obesity     Osteomyelitis ([facility])     last assessed 11/4/16-per son not currently    Penetrating foot wound, left, initial encounter 01/19/2022    PVC's (premature ventricular contractions)     sees  Cardio    Stroke ([facility])     last weeof July 2018 [facility]    TIA (transient  "ischemic attack) 10/28/2018    Ulcer of left heel, limited to breakdown of skin (HCC) 06/13/2024    Wears dentures     Wears glasses    [2]   Past Surgical History:  Procedure Laterality Date    ABDOMINAL SURGERY      CARDIAC CATHETERIZATION N/A 05/02/2022    Procedure: Cardiac Coronary Angiogram;  Surgeon: Sam Davis MD;  [facility];  Service: Cardiology    CARDIAC CATHETERIZATION N/A 05/02/2022    Procedure: Cardiac pci;  Surgeon: Sam Davis MD;  [facility];  Service: Cardiology    CARDIAC CATHETERIZATION  02/01/2023    Procedure: Cardiac catheterization;  Surgeon: Sam Davis MD;  [facility]: [facility]ARD[facility];  Service: Cardiology    CARDIAC CATHETERIZATION N/A 02/01/2023    Procedure: Cardiac pci;  Surgeon: Sam Davis MD;  [facility];  Service: Cardiology    CARDIAC CATHETERIZATION N/A 02/01/2023    Procedure: Cardiac Coronary Angiogram;  Surgeon: Sam Davis MD;  [facility];  Service: Cardiology    CARDIAC CATHETERIZATION N/A 02/01/2023    Procedure: Cardiac other-IVUS;  Surgeon: Sam Davis MD;  [facility];  Service: Cardiology    CHOLECYSTECTOMY      Percutaneous    COLONOSCOPY      CORONARY ANGIOPLASTY WITH STENT PLACEMENT      CYSTOSCOPY      HEMODIALYSIS ADULT  1/22/2024    HEMODIALYSIS ADULT  01/24/2024    HEMODIALYSIS ADULT  5/19/2025    IR LOWER EXTREMITY ANGIOGRAM  9/29/2023    IR LOWER EXTREMITY ANGIOGRAM  02/26/2024    OTHER SURGICAL HISTORY      \"stimulator to control bowel movements\"    IN ESOPHAGOGASTRODUODENOSCOPY TRANSORAL DIAGNOSTIC N/A 09/27/2016    Procedure: ESOPHAGOGASTRODUODENOSCOPY (EGD);  Surgeon: Adele Rowe MD;  Location: [facility] GI LAB;  Service: Gastroenterology    IN LAPAROSCOPY SURG CHOLECYSTECTOMY N/A 02/29/2016    Procedure: LAPAROSCOPIC CHOLECYSTECTOMY ;  Surgeon: Cliff Roman DO;  Location: [facility] Main [facility];  Service: General    IN SLCTV CATHJ 3RD+ ORD SLCTV ABDL PEL/LXTR BRNCH " "Left 9/29/2023    Procedure: Left leg angiogram with intervention;  Surgeon: Michelle Galvan MD;  Location: [facility] MA[facility];  Service: Vascular    AR SLCTV CATHJ 3RD+ ORD SLCTV ABDL PEL/LXTR BRNCH Left 02/26/2024    Procedure: Left leg angiogram antegrade access, left popliteal angioplasty;  Surgeon: Michelle Galvan MD;  Location: [facility];  Service: Vascular    ROTATOR CUFF REPAIR Right     SPINAL CORD STIMULATOR IMPLANT      \"Medtronic interstim model # 3058- in lower back to control bowel movements-currently turned off-battery is dead\"    TOE AMPUTATION Right 10/28/2016    Procedure: 3RD TOE AMPUTATION ;  Surgeon: Anjel Salas DPM;  Location: [facility];  Service:      "

## 2025-05-22 NOTE — ASSESSMENT & PLAN NOTE
Suspected that his presenting episode of unresponsiveness and low BP due to low blood pressure post dialysis and fluid removal, in the setting of severe AS and cardiomyopathy, sepsis   PTA - takes midodrine prior to HD  -Now off norepi gtt  -On midodrine 15 mg TID, try to lower dose as able    -Continue holding Entresto and BB

## 2025-05-22 NOTE — PROGRESS NOTES
Progress Note - Hospitalist   Name: Asad Galloway 65 y.o. male I MRN: [id number]  Unit/Bed#: S -01 I Date of Admission: 5/17/2025   Date of Service: 5/22/2025 I Hospital Day: 5    Assessment & Plan  Hypotension  Patient requiring norepinephrine to maintain maps above 65.  Borderline BP.  Low blood blood pressure postdialysis and fluid removal that day in the setting of severe AS and cardiomyopathy.  Also consideration of sepsis/infection, suspected pneumonia on CT.  Sepsis versus some component of cardiogenic shock     Plan:  Continue midodrine 15 mg 3 times daily  BP currently stable at 102/43  Will try to decrease of midodrine as able   Can give albumin as needed for hypotension but avoid fluids due to low EF and severe AS   Dialysis scheduled for Friday 5/23  Will consider restarting metoprolol if BP remains stable  PT/OT evaluation   History of stroke in adulthood  Thrombosis of left MCA in 2018  No residual symptoms, except cognitive impairment (per notes)  Poor historian  Continue aspirin  Anemia  Likely 2/2 to CKD  Continue to trend CBC daily   Epogen c/I in the setting of CVA  ESRD on dialysis (HCC)  Lab Results   Component Value Date    EGFR 10 05/22/2025    EGFR 8 05/21/2025    EGFR 11 05/20/2025    CREATININE 5.43 (H) 05/22/2025    CREATININE 6.59 (H) 05/21/2025    CREATININE 4.86 (H) 05/20/2025     CAD, multiple vessel  3/17/25 ([place]) - [facility] 90% pLAD stenosis, severe plaque burden, in-stent restenosis with areas of underexpansion and calcified nodules in vision between overlap of stents --> POBA with drug-coated balloon for in-stent restenosis, no new stent  Status post PCI to LAD  Continue aspirin, prasugrel with recent PCI  noted to be Plavix nonresponder at OSH with history of in-stent restenosis  Ischemic cardiomyopathy  Metoprolol, entresto on hold due to low blood pressure   CHF with acute exacerbation, LVEF 30 to 35%.  Acute volume overload due to poor HD sessions.  Up 10K  "from dry weight.  Aortic stenosis, moderate to severe, ongoing TAVR workup [place]   Echo 5/18: LVEF 25%. Systolic function is severely reduced.  Moderate/severe AAS, mild/moderate TR     Plan:  Resume GDMT if blood pressures allow  Cardiology following  Mood disorder (HCC)  Unspecified mood disorder, patient previously on Depakote. Was discontinued during his previous admission in [facility] due to concerns for thrombocytopenia.   Avoid QTC prolonging medications due to QTC of 550  Can give IV Ativan prn for agitation judiciously   Absence of toe of right foot (HCC)  Chronic wounds, wound care on board  Chronic systolic heart failure ([facility])  Wt Readings from Last 3 Encounters:   05/22/25 91.8 kg (202 lb 6.1 oz)   05/02/25 94.3 kg (208 lb)   05/01/25 94.8 kg (209 lb)     Cardiology following  Most recent EF 25% with evidence of severe aortic stenosis   Resume GDMT if blood pressures allow  Cardiology following  Chronic deep vein thrombosis (DVT) of distal vein of right lower extremity ([facility])  On Eliquis 2.5 mg BID  Anemia due to chronic kidney disease, on chronic dialysis ([facility])  Lab Results   Component Value Date    EGFR 10 05/22/2025    EGFR 8 05/21/2025    EGFR 11 05/20/2025    CREATININE 5.43 (H) 05/22/2025    CREATININE 6.59 (H) 05/21/2025    CREATININE 4.86 (H) 05/20/2025     Cirrhosis ([facility])  Cirrhotic liver morphology on CT  Sepsis ([facility])  2/2 atypical PNA  Continue antibiotics as described in \"Pneumonia\"   Pneumonia  Treated for atypical PNA in ICU on cefepime and doxy; last day of Cefepime 5/21  Continue doxy for another 11 doses   WBC wnl, afebrile  Continue to trend CBC and fever curve   Lactic acid WNL at 1.3  Nonrheumatic aortic valve stenosis  Plan for TAVR 5/29/2025  Son requested to speak with cardiology regarding decreasing midodrine prior to procedure  Encounter for dialysis ([facility])  Nephro following  Dialysis MWF  Palliative care by specialist  Palliative on board  Full code - level " "1  Life vest   QT prolongation  QTc prolonged at 575 as of 5/20  Magnesium currently 2, but given that patient has CHF and a prolonged QTc, patient is at high risk of torsades.  Will give additional dose of IV magnesium 2 mg  Continue to monitor on telemetry  Finger lesion  Patient has blackened, nonhealing ulcer on left fifth digit.  Per patient, this has been present for several weeks.  X-ray of left hand pending to evaluate for any signs of osteomyelitis    VTE Pharmacologic Prophylaxis:   High Risk (Score >/= 5) - Pharmacological DVT Prophylaxis Ordered: apixaban (Eliquis). Sequential Compression Devices Ordered.    Mobility:   Basic Mobility Inpatient Raw Score: 11  JH-HLM Goal: 4: Move to chair/commode  JH-HLM Achieved: 4: Move to chair/commode  JH-HLM Goal NOT achieved. Continue with multidisciplinary rounding and encourage appropriate mobility to improve upon JH-HLM goals.    Patient Centered Rounds: I performed bedside rounds with nursing staff today.   Discussions with Specialists or Other Care Team Provider: Cardiology, nephrology, ICU, palliative care    Education and Discussions with Family / Patient: Updated  (son) at bedside.    Current Length of Stay: 5 day(s)  Current Patient Status: Inpatient   Certification Statement: The patient will continue to require additional inpatient hospital stay due to x-ray fifth digit pending, weaning off midodrine prior to anticipated TAVR 5/29  Discharge Plan: Anticipate discharge in 24-48 hrs to discharge location to be determined pending rehab evaluations.    Code Status: Level 1 - Full Code    Subjective   No overnight events.  Patient did not require restraints overnight.  However, this morning, patient was very agitated and was extremely tender on his left fifth digit, which is blackened and nonhealing.  Patient repeatedly says \"I want to die\" and \"I hate my life.\"  Patient combative this morning and swinging at staff refusing exam.     Objective " :  Temp:  [97.7 °F (36.5 °C)-98 °F (36.7 °C)] 98 °F (36.7 °C)  HR:  [] 95  BP: ()/(39-65) 100/47  Resp:  [16-18] 18  SpO2:  [94 %-97 %] 96 %  O2 Device: None (Room air)    Body mass index is 29.89 kg/m².     Input and Output Summary (last 24 hours):     Intake/Output Summary (Last 24 hours) at 5/22/2025 0916  Last data filed at 5/22/2025 0603  Gross per 24 hour   Intake 1240 ml   Output 1598 ml   Net -358 ml       Physical Exam  Constitutional:       Appearance: He is well-developed.     Patient declined exam.      Lines/Drains:        Telemetry:  Telemetry Orders (From admission, onward)               24 Hour Telemetry Monitoring  Continuous x 24 Hours (Telem)        Expiring   Question:  Reason for 24 Hour Telemetry  Answer:  Decompensated CHF- and any one of the following: continuous diuretic infusion or total diuretic dose >200 mg daily, associated electrolyte derangement (I.e. K < 3.0), inotropic drip (continuous infusion), hx of ventricular arrhythmia, or new EF < 35%                     Telemetry Reviewed: Normal Sinus Rhythm  Indication for Continued Telemetry Use: Acute CHF on >200 mg lasix/day or equivalent dose or with new reduced EF.                Lab Results: I have reviewed the following results:   Results from last 7 days   Lab Units 05/22/25  0604 05/20/25  0524 05/19/25  0321   WBC Thousand/uL 4.06*   < > 9.33   HEMOGLOBIN g/dL 8.1*   < > 8.7*   HEMATOCRIT % 26.3*   < > 27.9*   PLATELETS Thousands/uL 82*   < > 104*   SEGS PCT %  --   --  77*   LYMPHO PCT %  --   --  9*   MONO PCT %  --   --  11   EOS PCT %  --   --  2    < > = values in this interval not displayed.     Results from last 7 days   Lab Units 05/22/25  0604 05/21/25  0600   SODIUM mmol/L 137 137   POTASSIUM mmol/L 4.3 4.8   CHLORIDE mmol/L 97 97   CO2 mmol/L 28 28   BUN mg/dL 31* 39*   CREATININE mg/dL 5.43* 6.59*   ANION GAP mmol/L 12 12   CALCIUM mg/dL 8.1* 8.3*   ALBUMIN g/dL  --  3.8   TOTAL BILIRUBIN mg/dL  --  0.87    ALK PHOS U/L  --  116*   ALT U/L  --  5*   AST U/L  --  7*   GLUCOSE RANDOM mg/dL 118 89     Results from last 7 days   Lab Units 05/21/25  0600   INR  1.34*     Results from last 7 days   Lab Units 05/22/25  0741 05/21/25  2054 05/21/25  1632 05/21/25  1156 05/21/25  0943 05/20/25  1111 05/20/25  0723 05/19/25  0949 05/19/25  0748 05/18/25  2021 05/18/25  1641 05/18/25  1053   POC GLUCOSE mg/dl 110 130 130 159* 116 169* 107 182* 125 154* 102 83         Results from last 7 days   Lab Units 05/22/25  0604 05/19/25  0321 05/17/25  0431 05/17/25  0334   LACTIC ACID mmol/L 1.3 1.0 2.8* 3.1*   PROCALCITONIN ng/ml  --   --   --  0.23       Recent Cultures (last 7 days):   Results from last 7 days   Lab Units 05/17/25  0551 05/17/25  0448   BLOOD CULTURE  No Growth After 4 Days. No Growth After 4 Days.             Last 24 Hours Medication List:     Current Facility-Administered Medications:     acetaminophen (TYLENOL) oral suspension 650 mg, Q6H PRN    apixaban (ELIQUIS) tablet 2.5 mg, BID    aspirin chewable tablet 81 mg, Daily    atorvastatin (LIPITOR) tablet 40 mg, Daily With Dinner    chlorhexidine (PERIDEX) 0.12 % oral rinse 15 mL, Q12H MICH    cyanocobalamin (VITAMIN B-12) tablet 250 mcg, Daily    divalproex sodium (DEPAKOTE SPRINKLE) capsule 250 mg, Q12H MICH    doxycycline hyclate (VIBRAMYCIN) capsule 100 mg, Q12H MICH    magnesium sulfate 2 g/50 mL IVPB (premix) 2 g, Once, Last Rate: 2 g (05/22/25 0841)    melatonin tablet 3 mg, HS    [Held by provider] metoprolol succinate (TOPROL-XL) 24 hr tablet 25 mg, BID    midodrine (PROAMATINE) tablet 10 mg, Before Dialysis    midodrine (PROAMATINE) tablet 15 mg, TID AC    prasugrel (EFFIENT) tablet 10 mg, Daily    Administrative Statements   Today, Patient Was Seen By: Aniyah Kaur DO      **Please Note: This note may have been constructed using a voice recognition system.**

## 2025-05-22 NOTE — PLAN OF CARE
Problem: PHYSICAL THERAPY ADULT  Goal: Performs mobility at highest level of function for planned discharge setting.  See evaluation for individualized goals.  Description: Treatment/Interventions: Functional transfer training, LE strengthening/ROM, Therapeutic exercise, Endurance training, Patient/family training, Cognitive reorientation, Bed mobility, Equipment eval/education, Gait training, Spoke to nursing, Spoke to case management, OT, Elevations  Equipment Recommended:  (pt has RW)     See flowsheet documentation for full assessment, interventions and recommendations.  Outcome: Progressing  Note: Prognosis: Fair  Problem List: Decreased strength, Decreased range of motion, Decreased endurance, Impaired balance, Decreased mobility, Decreased cognition, Impaired judgement, Decreased safety awareness, Decreased skin integrity, Pain  Assessment: Pt seen for PT treatment 5/22/2025 with focus on: gait training and therapeutic activity, with intervention focusing on goal of increased independence and safety with functional mobility. Pt presents seated OOB in recliner chair, is agreeable to participate in session, requests utilization of bathroom. During session, pt requires MODA for STS transfers from recliner chair, MODA + RW for ambulation 20ft into bathroom, MAXA for STS transfers from toilet x2 trials (dependent in hygiene care and lower body dressing/management), and MODA for ambulation of 10ft w/ RW (limited due to O2 desat and c/o dizziness, requiring chair brought closer to pt for safety). Overall pt continues to perform below their baseline level of functional mobility due to pain, weakness, impaired balance, decreased endurance, impaired gait mechanics, varying behaviors and emotional status. Pt continues to most appropriately benefit from Level II (moderate PT intensity) resources upon d/c from the acute care setting. Continue skilled PT POC as able and appropriate in order to progress towards therapy  goals and maximize independence with functional mobility. Next session, plan for intervention of increased ambulation training with RW for increased distance as able and appropriate.    Barriers to Discharge: Decreased caregiver support, Inaccessible home environment     Rehab Resource Intensity Level, PT: II (Moderate Resource Intensity)    See flowsheet documentation for full assessment.

## 2025-05-22 NOTE — ASSESSMENT & PLAN NOTE
Unspecified mood disorder, patient previously on Depakote. Was discontinued during his previous admission in [facility] due to concerns for thrombocytopenia.   Avoid QTC prolonging medications due to QTC of 550  Can give IV Ativan prn for agitation judiciously

## 2025-05-22 NOTE — PLAN OF CARE
Problem: Nutrition/Hydration-ADULT  Goal: Nutrient/Hydration intake appropriate for improving, restoring or maintaining nutritional needs  Description: Monitor and assess patient's nutrition/hydration status for malnutrition. Collaborate with interdisciplinary team and initiate plan and interventions as ordered.  Monitor patient's weight and dietary intake as ordered or per policy. Utilize nutrition screening tool and intervene as necessary. Determine patient's food preferences and provide high-protein, high-caloric foods as appropriate.     INTERVENTIONS:  - Monitor oral intake, urinary output, labs, and treatment plans  - Assess nutrition and hydration status and recommend course of action  - Evaluate amount of meals eaten  - Assist patient with eating if necessary   - Allow adequate time for meals  - Recommend/ encourage appropriate diets, oral nutritional supplements, and vitamin/mineral supplements  - Order, calculate, and assess calorie counts as needed  - Recommend, monitor, and adjust tube feedings and TPN/PPN based on assessed needs  - Assess need for intravenous fluids  - Provide specific nutrition/hydration education as appropriate  - Include patient/family/caregiver in decisions related to nutrition  Outcome: Progressing     Problem: METABOLIC, FLUID AND ELECTROLYTES - ADULT  Goal: Electrolytes maintained within normal limits  Description: INTERVENTIONS:  - Monitor labs and assess patient for signs and symptoms of electrolyte imbalances  - Administer electrolyte replacement as ordered  - Monitor response to electrolyte replacements, including repeat lab results as appropriate  - Instruct patient on fluid and nutrition as appropriate  Outcome: Progressing  Goal: Fluid balance maintained  Description: INTERVENTIONS:  - Monitor labs   - Monitor I/O and WT  - Instruct patient on fluid and nutrition as appropriate  - Assess for signs & symptoms of volume excess or deficit  Outcome: Progressing     Problem:  Prexisting or High Potential for Compromised Skin Integrity  Goal: Skin integrity is maintained or improved  Description: INTERVENTIONS:  - Identify patients at risk for skin breakdown  - Assess and monitor skin integrity including under and around medical devices   - Assess and monitor nutrition and hydration status  - Monitor labs  - Assess for incontinence   - Turn and reposition patient  - Assist with mobility/ambulation  - Relieve pressure over aj prominences   - Avoid friction and shearing  - Provide appropriate hygiene as needed including keeping skin clean and dry  - Evaluate need for skin moisturizer/barrier cream  - Collaborate with interdisciplinary team  - Patient/family teaching  - Consider wound care consult    Assess:  - Review Alfredito scale daily  - Clean and moisturize skin every   - Inspect skin when repositioning, toileting, and assisting with ADLS  - Assess under medical devices such as  every   - Assess extremities for adequate circulation and sensation     Bed Management:  - Have minimal linens on bed & keep smooth, unwrinkled  - Change linens as needed when moist or perspiring  - Avoid sitting or lying in one position for more than  hours while in bed?Keep HOB at degrees   - Toileting:  - Offer bedside commode  - Assess for incontinence every   - Use incontinent care products after each incontinent episode such as     Activity:  - Mobilize patient  times a day  - Encourage activity and walks on unit  - Encourage or provide ROM exercises   - Turn and reposition patient every  Hours  - Use appropriate equipment to lift or move patient in bed  - Instruct/ Assist with weight shifting every  when out of bed in chair  - Consider limitation of chair time  hour intervals    Skin Care:  - Avoid use of baby powder, tape, friction and shearing, hot water or constrictive clothing  - Relieve pressure over bony prominences using   - Do not massage red bony areas    Next Steps:  - Teach patient strategies  to minimize risks such as   - Consider consults to  interdisciplinary teams such as   Outcome: Progressing

## 2025-05-22 NOTE — ASSESSMENT & PLAN NOTE
Patient has blackened, nonhealing ulcer on left fifth digit.  Per patient, this has been present for several weeks.  X-ray of left hand pending to evaluate for any signs of osteomyelitis

## 2025-05-22 NOTE — QUICK NOTE
05/22/2025 2:31 PM -  Asad Galloway's chart and case were reviewed by Rayo Bourne MD. Mode of review included electronic chart check.    Per review, symptoms remain controlled on current regimen and no changes are made at this time. Please continue the regimen in place, and review our last note for details. For dispo plan, please review Case Management notes.     Palliative will  sign off at this time.    Patient is appropriate for outpatient PSC follow-up. We will make arrangements through our office.        For urgent issues or any questions/concerns, please notify on-call provider via [facility] Secure Chat. You may also call our answering service 24/7 at [phone].    Rayo Bourne MD  Palliative and Supportive Care  Clinic/Answering Service: [phone]  You can find me on [employer]!

## 2025-05-22 NOTE — ASSESSMENT & PLAN NOTE
Metoprolol, entresto on hold due to low blood pressure   CHF with acute exacerbation, LVEF 30 to 35%.  Acute volume overload due to poor HD sessions.  Up 10K from dry weight.  Aortic stenosis, moderate to severe, ongoing TAVR workup [facility]   Echo 5/18: LVEF 25%. Systolic function is severely reduced.  Moderate/severe AAS, mild/moderate TR     Plan:  Resume GDMT if blood pressures allow  Cardiology following

## 2025-05-22 NOTE — ASSESSMENT & PLAN NOTE
3/17/25 ([facility] [place], [place]) - [facility] 90% pLAD stenosis, severe plaque burden, in-stent restenosis with areas of underexpansion and calcified nodules in vision between overlap of stents --> POBA with drug-coated balloon for in-stent restenosis, no new stent  Status post PCI to LAD  Continue aspirin, prasugrel with recent PCI  noted to be Plavix nonresponder at OSH with history of in-stent restenosis

## 2025-05-22 NOTE — ASSESSMENT & PLAN NOTE
Lab Results   Component Value Date    EGFR 10 05/22/2025    EGFR 8 05/21/2025    EGFR 11 05/20/2025    CREATININE 5.43 (H) 05/22/2025    CREATININE 6.59 (H) 05/21/2025    CREATININE 4.86 (H) 05/20/2025   #ESRD on HD MWF:  Dialysis unit/days: [facility]   Access:AV fistula  EDW 95 kg, unable to achieve dry weight as per family because he drinks too much water.  Fluid restriction less than 1.5 L/day  Reinforced compliance with dialysis  Underwent hemodialysis yesterday, removed 800 mL.  Patient himself cut his treatment short by 30 minutes  Plan next dialysis tomorrow May 23

## 2025-05-22 NOTE — TELEPHONE ENCOUNTER
Caller: Sam Galloway     Doctor: Dr. Tan    Reason for call: Son calling to inform that patient is currently hospitalized and will not be able to make scheduled appointment today. Patient has surgery scheduled for next week Thursday and son is requesting for father to be seen on Tuesday 5/27/2025 or for a virtual visit that day. Patient is expected to be discharged possibly Friday 5/23/2025.    Call back#: [phone]

## 2025-05-22 NOTE — TELEPHONE ENCOUNTER
"Hello,    The following message was sent via e-mail to the leadership team:     Please advise if you can help facilitate the following overbook request:    Patient Name: Asad Galloway     Patient MRN: [id number]     Call back #: [phone]    Insurance: Medicare     Department:Cardiology    Speciality: General Cardiology    Reason for overbook request: OTHER (PLEASE WRITE REASON IN COMMENT SECTION)    Comments (Write \"N/a\" if no comments): Patient was to see Dr. Tan today but is admitted at hospital. Patient will be having surgery next week Thursday and needs to have a consult prior. Son is asking for an appointment for Tuesday 5/27/2025 or a virtual appointment.    Requested doctor and location: Dr. Tan     Date of current appointment: 6/19/2025      Thank you.    "

## 2025-05-22 NOTE — ASSESSMENT & PLAN NOTE
QTc prolonged at 575 as of 5/20  Magnesium currently 2, but given that patient has CHF and a prolonged QTc, patient is at high risk of torsades.  Will give additional dose of IV magnesium 2 mg  Continue to monitor on telemetry

## 2025-05-22 NOTE — PROGRESS NOTES
Progress Note - Cardiology   Name: Asad Galloway 65 y.o. male I MRN: [id number]  Unit/Bed#: S -01 I Date of Admission: 5/17/2025   Date of Service: 5/22/2025 I Hospital Day: 5     Assessment & Plan  ESRD on dialysis (HCC)  Lab Results   Component Value Date    EGFR 10 05/22/2025    EGFR 8 05/21/2025    EGFR 11 05/20/2025    CREATININE 5.43 (H) 05/22/2025    CREATININE 6.59 (H) 05/21/2025    CREATININE 4.86 (H) 05/20/2025     Hypotension  Suspected that his presenting episode of unresponsiveness and low BP due to low blood pressure post dialysis and fluid removal, in the setting of severe AS and cardiomyopathy, sepsis   PTA - takes midodrine prior to HD  -Now off norepi gtt  -On midodrine 15 mg TID, try to lower dose as able    -Continue holding Entresto and BB  CAD, multiple vessel  Recent PCI in March 2025  -Continue aspirin, prasugrel  Ischemic cardiomyopathy  LVEF 25-30%  LifeVest without arrhythmia or therapies delivered  -Metoprolol and Entresto held due to above    Nonrheumatic aortic valve stenosis  Moderate to severe AS  Ongoing TAVR work up at [facility] [place], [place]    Subjective   Interval history: Patient seen & examined. No acute events overnight.    Objective :  Temp:  [97.7 °F (36.5 °C)-98 °F (36.7 °C)] 98 °F (36.7 °C)  HR:  [] 94  BP: ()/(39-66) 86/66  Resp:  [16-18] 17  SpO2:  [96 %-99 %] 99 %  O2 Device: None (Room air)  Orthostatic Blood Pressures      Flowsheet Row Most Recent Value   Blood Pressure 86/66 filed at 05/22/2025 1103   Patient Position - Orthostatic VS Sitting filed at 05/21/2025 1211          First Weight: Weight - Scale: 107 kg (235 lb 7.2 oz) (05/17/25 0302)  Vitals:    05/20/25 0525 05/22/25 0600   Weight: 93 kg (205 lb 0.4 oz) 91.8 kg (202 lb 6.1 oz)     Physical Exam  Constitutional:       General: He is not in acute distress.     Appearance: He is well-developed. He is not toxic-appearing.     Eyes:      General: No scleral icterus.      Cardiovascular:       Heart sounds: Murmur heard.   Pulmonary:      Effort: Pulmonary effort is normal. No respiratory distress.      Breath sounds: Normal breath sounds. No wheezing or rales.   Abdominal:      General: Bowel sounds are normal. There is no distension.      Palpations: Abdomen is soft.      Tenderness: There is no abdominal tenderness.     Musculoskeletal:      Right lower leg: No edema.     Skin:     General: Skin is warm and dry.      Coloration: Skin is not jaundiced.     Neurological:      Mental Status: He is alert.           Lab Results: I have reviewed the following results:CBC/BMP:   .     05/22/25  0604   WBC 4.06*   HGB 8.1*   HCT 26.3*   PLT 82*   SODIUM 137   K 4.3   CL 97   CO2 28   BUN 31*   CREATININE 5.43*   GLUC 118   MG 2.0      Results from last 7 days   Lab Units 05/22/25  0604 05/21/25  0600 05/20/25 0524   WBC Thousand/uL 4.06* 5.26 9.05   HEMOGLOBIN g/dL 8.1* 8.2* 8.6*   HEMATOCRIT % 26.3* 26.3* 27.3*   PLATELETS Thousands/uL 82* 93* 119*     Results from last 7 days   Lab Units 05/22/25  0604 05/21/25  0600 05/20/25  0524 05/17/25  0334 05/17/25  0305   POTASSIUM mmol/L 4.3 4.8 4.2   < >  --    CHLORIDE mmol/L 97 97 95*   < >  --    CO2 mmol/L 28 28 30   < >  --    CO2, I-STAT mmol/L  --   --   --   --  27   BUN mg/dL 31* 39* 26*   < >  --    CREATININE mg/dL 5.43* 6.59* 4.86*   < >  --    GLUCOSE, ISTAT mg/dl  --   --   --   --  152*   CALCIUM mg/dL 8.1* 8.3* 8.2*   < >  --     < > = values in this interval not displayed.     Results from last 7 days   Lab Units 05/21/25  0600 05/20/25  0524 05/19/25  0542 05/18/25  2135 05/18/25  1523 05/17/25  1850 05/17/25  1029   INR  1.34* 1.27*  --   --   --   --  1.58*   PTT seconds  --   --  149* >210* 53*   < > 116*    < > = values in this interval not displayed.     Lab Results   Component Value Date    HGBA1C 5.4 04/10/2025     Lab Results   Component Value Date    CKTOTAL 188 11/12/2022    CKMB 4.0 11/12/2022    CKMBINDEX 2.1 11/12/2022    TROPONINI  <0.02 09/08/2021

## 2025-05-22 NOTE — PROGRESS NOTES
Progress Note - Nephrology   Name: Asad Galloway 65 y.o. male I MRN: [id number]  Unit/Bed#: S -01 I Date of Admission: 5/17/2025   Date of Service: 5/22/2025 I Hospital Day: 5     Assessment & Plan  ESRD on dialysis (HCC)  Lab Results   Component Value Date    EGFR 10 05/22/2025    EGFR 8 05/21/2025    EGFR 11 05/20/2025    CREATININE 5.43 (H) 05/22/2025    CREATININE 6.59 (H) 05/21/2025    CREATININE 4.86 (H) 05/20/2025   #ESRD on HD MWF:  [place] [facility]   Access:AV fistula  EDW 95 kg, unable to achieve dry weight as per family because he drinks too much water.  Fluid restriction less than 1.5 L/day  Reinforced compliance with dialysis  Underwent hemodialysis yesterday, removed 800 mL.  Patient himself cut his treatment short by 30 minutes  Plan next dialysis tomorrow May 23  Anemia  Hemoglobin stable at 8.1  Patient on Mircera as an outpatient  Hypotension  Noncompliant with fluid restriction as an outpatient.  Patient has underlying cardiomyopathy and severe aortic stenosis.  Ultrafiltration as blood pressure allows  Chronically low  Continue midodrine 15 mg 3 times daily  History of stroke in adulthood    Mood disorder (HCC)    Absence of toe of right foot (HCC)    Chronic deep vein thrombosis (DVT) of distal vein of right lower extremity (HCC)    Anemia due to chronic kidney disease, on chronic dialysis ([facility])  Lab Results   Component Value Date    EGFR 10 05/22/2025    EGFR 8 05/21/2025    EGFR 11 05/20/2025    CREATININE 5.43 (H) 05/22/2025    CREATININE 6.59 (H) 05/21/2025    CREATININE 4.86 (H) 05/20/2025   -- Mircera as an outpatient hemoglobin stable  Cirrhosis (HCC)    Sepsis (HCC)  -- Management as per critical care  Pneumonia  -- Currently on IV cefepime  Goals of care, counseling/discussion  -- Ongoing  Nonrheumatic aortic valve stenosis  -- Patient plan for surgery at [facility]   Encounter for dialysis ([facility])  -- Plan for dialysis this afternoon  Palliative care by  specialist    QT prolongation    Finger lesion      I have reviewed the nephrology recommendations including assessment and plan, with patient, and we are in agreement with renal plan including the information outlined above. Nephrology service will follow.    Subjective   Brief History of Admission - 65 y.o. man with PMH of ESRD on HD MWF via AV fistula, anemia, hypertension, CHF, ischemic cardiomyopathy present unresponsive. Nephrolgy is consulted for management of ESRD     Underwent dialysis yesterday.  No overnight events.    Objective :  Temp:  [97.7 °F (36.5 °C)-98 °F (36.7 °C)] 98 °F (36.7 °C)  HR:  [] 94  BP: ()/(39-66) 86/66  Resp:  [16-18] 17  SpO2:  [96 %-99 %] 99 %  O2 Device: None (Room air)    Current Weight: Weight - Scale: 91.8 kg (202 lb 6.1 oz)  First Weight: Weight - Scale: 107 kg (235 lb 7.2 oz)  I/O         05/20 0701  05/21 0700 05/21 0701  05/22 0700 05/22 0701  05/23 0700    P.O. 0 560     I.V. (mL/kg) 176.2 (1.9) 400 (4.4)     Other  400     IV Piggyback 130      Total Intake(mL/kg) 306.2 (3.3) 1360 (14.8)     Urine (mL/kg/hr)  0 (0)     Other  1598     Total Output  1598     Net +306.2 -238                  Physical Exam  Constitutional:       General: He is not in acute distress.     Appearance: He is well-developed. He is not diaphoretic.   HENT:      Head: Normocephalic and atraumatic.     Eyes:      General: No scleral icterus.     Pupils: Pupils are equal, round, and reactive to light.       Cardiovascular:      Rate and Rhythm: Normal rate and regular rhythm.      Heart sounds: Normal heart sounds. No murmur heard.     No friction rub. No gallop.   Pulmonary:      Effort: Pulmonary effort is normal. No respiratory distress.      Breath sounds: Normal breath sounds. No wheezing or rales.   Chest:      Chest wall: No tenderness.   Abdominal:      General: Bowel sounds are normal. There is no distension.      Palpations: Abdomen is soft.      Tenderness: There is no abdominal  tenderness. There is no rebound.     Musculoskeletal:         General: Normal range of motion.      Cervical back: Normal range of motion and neck supple.     Skin:     Findings: No rash.     Neurological:      Mental Status: He is alert and oriented to person, place, and time.         Medications:  Current Medications[1]      Lab Results: I have reviewed the following results:  Results from last 7 days   Lab Units 05/22/25  0604 05/21/25  0600 05/20/25  0524 05/19/25  0321 05/18/25  1139 05/18/25  0432 05/18/25  0024 05/17/25  1700 05/17/25  0828 05/17/25  0502 05/17/25  0334 05/17/25  0334 05/17/25  0305   WBC Thousand/uL 4.06* 5.26 9.05 9.33  --  6.72  --   --   --  6.11  --  4.96  --    HEMOGLOBIN g/dL 8.1* 8.2* 8.6* 8.7* 9.5* 8.5* 8.6*   < >  --  9.8*   < > 9.7*  --    I STAT HEMOGLOBIN g/dl  --   --   --   --   --   --   --   --   --   --   --   --  11.2*   HEMATOCRIT % 26.3* 26.3* 27.3* 27.9* 29.6* 27.3* 27.2*   < >  --  31.0*   < > 31.1*  --    HEMATOCRIT, ISTAT %  --   --   --   --   --   --   --   --   --   --   --   --  33*   PLATELETS Thousands/uL 82* 93* 119* 104*  --  77*  --   --   --  95*  --  91*  --    POTASSIUM mmol/L 4.3 4.8 4.2 4.6  --  4.6  --   --  3.8 5.2   < > 5.0  --    CHLORIDE mmol/L 97 97 95* 93*  --  94*  --   --  100 93*   < > 92*  --    CO2 mmol/L 28 28 30 29  --  31  --   --  27 29   < > 28  --    CO2, I-STAT mmol/L  --   --   --   --   --   --   --   --   --   --   --   --  27   BUN mg/dL 31* 39* 26* 36*  --  24  --   --  30* 32*   < > 32*  --    CREATININE mg/dL 5.43* 6.59* 4.86* 6.11*  --  4.34*  --   --  4.31* 4.94*   < > 5.02*  --    CALCIUM mg/dL 8.1* 8.3* 8.2* 8.1*  --  7.9*  --   --  6.5* 7.7*   < > 7.8*  --    MAGNESIUM mg/dL 2.0 2.1 2.0  --   --  2.1  --   --  1.7* 2.1  --  2.1  --    PHOSPHORUS mg/dL  --  4.8* 3.8 4.8*  --  3.8  --   --  4.4* 5.5*  --   --   --    ALBUMIN g/dL  --  3.8 3.8  --   --   --   --   --   --  3.8  --  3.8  --    GLUCOSE, ISTAT mg/dl  --   --   --  "  --   --   --   --   --   --   --   --   --  152*    < > = values in this interval not displayed.       Administrative Statements   I have spent a total time of 20 minutes in caring for this patient on the day of the visit/encounter including Documenting in the medical record, Reviewing/placing orders in the medical record (including tests, medications, and/or procedures), and Obtaining or reviewing history  .  Portions of the record may have been created with voice recognition software. Occasional wrong word or \"sound a like\" substitutions may have occurred due to the inherent limitations of voice recognition software. Read the chart carefully and recognize, using context, where substitutions have occurred.If you have any questions, please contact the dictating provider.       [1]   Current Facility-Administered Medications:     acetaminophen (TYLENOL) oral suspension 650 mg, 650 mg, Oral, Q6H PRN, Belén Calle MD, 650 mg at 05/18/25 0645    apixaban (ELIQUIS) tablet 2.5 mg, 2.5 mg, Oral, BID, Belén Calle MD, 2.5 mg at 05/22/25 0840    aspirin chewable tablet 81 mg, 81 mg, Oral, Daily, Belén Calle MD, 81 mg at 05/22/25 0840    atorvastatin (LIPITOR) tablet 40 mg, 40 mg, Oral, Daily With Dinner, Belén Calle MD, 40 mg at 05/21/25 1718    chlorhexidine (PERIDEX) 0.12 % oral rinse 15 mL, 15 mL, Mouth/Throat, Q12H [facility], Belén Calle MD, 15 mL at 05/21/25 0942    cyanocobalamin (VITAMIN B-12) tablet 250 mcg, 250 mcg, Oral, Daily, Belén Calle MD, 250 mcg at 05/21/25 0942    divalproex sodium (DEPAKOTE SPRINKLE) capsule 250 mg, 250 mg, [facility], [facility], 250 mg at 05/21/25 2235    doxycycline hyclate (VIBRAMYCIN) capsule 100 mg, 100 mg, Oral, Q12H [facility], 100 mg at 05/22/25 0840    magnesium sulfate 2 g/50 mL IVPB (premix) 2 g, 2 g, Intravenous, Once, Aniyah Kaur DO    melatonin tablet 3 mg, 3 mg, Oral, HS, Artemio Moralez MD, 3 mg at 05/21/25 7652    [Held by provider] metoprolol succinate " (TOPROL-XL) 24 hr tablet 25 mg, 25 mg, Oral, BID, Belén Calle MD    midodrine (PROAMATINE) tablet 10 mg, 10 mg, Oral, Before Dialysis, Belén Calle MD, 10 mg at 05/21/25 1227    midodrine (PROAMATINE) tablet 15 mg, 15 mg, Oral, TID AC, Belén Calle MD, 15 mg at 05/22/25 0610    prasugrel (EFFIENT) tablet 10 mg, 10 mg, Oral, Daily, Belén Calle MD, 10 mg at 05/21/25 9355

## 2025-05-23 ENCOUNTER — APPOINTMENT (INPATIENT)
Dept: DIALYSIS | Facility: HOSPITAL | Age: 65
DRG: 208 | End: 2025-05-23
Payer: MEDICARE

## 2025-05-23 VITALS
OXYGEN SATURATION: 95 % | HEIGHT: 69 IN | DIASTOLIC BLOOD PRESSURE: 54 MMHG | HEART RATE: 85 BPM | BODY MASS INDEX: 30.53 KG/M2 | SYSTOLIC BLOOD PRESSURE: 122 MMHG | RESPIRATION RATE: 16 BRPM | TEMPERATURE: 97.8 F | WEIGHT: 206.13 LBS

## 2025-05-23 LAB
ANION GAP SERPL CALCULATED.3IONS-SCNC: 12 MMOL/L (ref 4–13)
BUN SERPL-MCNC: 42 MG/DL (ref 5–25)
CALCIUM SERPL-MCNC: 8.7 MG/DL (ref 8.4–10.2)
CHLORIDE SERPL-SCNC: 96 MMOL/L (ref 96–108)
CO2 SERPL-SCNC: 29 MMOL/L (ref 21–32)
CREAT SERPL-MCNC: 7.1 MG/DL (ref 0.6–1.3)
DME PARACHUTE DELIVERY DATE REQUESTED: NORMAL
DME PARACHUTE DELIVERY NOTE: NORMAL
DME PARACHUTE ITEM DESCRIPTION: NORMAL
DME PARACHUTE ORDER STATUS: NORMAL
DME PARACHUTE SUPPLIER NAME: NORMAL
DME PARACHUTE SUPPLIER PHONE: NORMAL
ERYTHROCYTE [DISTWIDTH] IN BLOOD BY AUTOMATED COUNT: 17.2 % (ref 11.6–15.1)
GFR SERPL CREATININE-BSD FRML MDRD: 7 ML/MIN/1.73SQ M
GLUCOSE SERPL-MCNC: 82 MG/DL (ref 65–140)
GLUCOSE SERPL-MCNC: 93 MG/DL (ref 65–140)
GLUCOSE SERPL-MCNC: 94 MG/DL (ref 65–140)
HCT VFR BLD AUTO: 27.7 % (ref 36.5–49.3)
HGB BLD-MCNC: 8.6 G/DL (ref 12–17)
MAGNESIUM SERPL-MCNC: 2.1 MG/DL (ref 1.9–2.7)
MCH RBC QN AUTO: 31.4 PG (ref 26.8–34.3)
MCHC RBC AUTO-ENTMCNC: 31 G/DL (ref 31.4–37.4)
MCV RBC AUTO: 101 FL (ref 82–98)
PLATELET # BLD AUTO: 89 THOUSANDS/UL (ref 149–390)
PMV BLD AUTO: 10.9 FL (ref 8.9–12.7)
POTASSIUM SERPL-SCNC: 4.4 MMOL/L (ref 3.5–5.3)
RBC # BLD AUTO: 2.74 MILLION/UL (ref 3.88–5.62)
SODIUM SERPL-SCNC: 137 MMOL/L (ref 135–147)
WBC # BLD AUTO: 4.8 THOUSAND/UL (ref 4.31–10.16)

## 2025-05-23 PROCEDURE — 83735 ASSAY OF MAGNESIUM: CPT

## 2025-05-23 PROCEDURE — 85027 COMPLETE CBC AUTOMATED: CPT

## 2025-05-23 PROCEDURE — 80048 BASIC METABOLIC PNL TOTAL CA: CPT

## 2025-05-23 PROCEDURE — 82948 REAGENT STRIP/BLOOD GLUCOSE: CPT

## 2025-05-23 RX ORDER — DOXYCYCLINE 100 MG/1
100 CAPSULE ORAL EVERY 12 HOURS SCHEDULED
Qty: 3 CAPSULE | Refills: 0 | Status: SHIPPED | OUTPATIENT
Start: 2025-05-23 | End: 2025-05-25

## 2025-05-23 RX ORDER — MIDODRINE HYDROCHLORIDE 5 MG/1
15 TABLET ORAL
Qty: 810 TABLET | Refills: 0 | Status: SHIPPED | OUTPATIENT
Start: 2025-05-23

## 2025-05-23 RX ADMIN — MIDODRINE HYDROCHLORIDE 15 MG: 5 TABLET ORAL at 05:34

## 2025-05-23 RX ADMIN — ASPIRIN 81 MG: 81 TABLET, CHEWABLE ORAL at 08:28

## 2025-05-23 RX ADMIN — POLYETHYLENE GLYCOL 3350 17 G: 17 POWDER, FOR SOLUTION ORAL at 08:28

## 2025-05-23 RX ADMIN — DOXYCYCLINE 100 MG: 100 CAPSULE ORAL at 08:28

## 2025-05-23 RX ADMIN — MIDODRINE HYDROCHLORIDE 10 MG: 5 TABLET ORAL at 08:28

## 2025-05-23 NOTE — ASSESSMENT & PLAN NOTE
Treated for atypical PNA in ICU on cefepime and doxy; last day of Cefepime 5/21  Continue doxy on discharge

## 2025-05-23 NOTE — PROGRESS NOTES
Progress Note - Cardiology   Name: Asad Galloway 65 y.o. male I MRN: [id number]  Unit/Bed#: S -01 I Date of Admission: 5/17/2025   Date of Service: 5/23/2025 I Hospital Day: 6   { ?Quick Links I Problem List I PORCH I Billing Tip:27021}  Assessment & Plan  ESRD on dialysis (HCC)  Lab Results   Component Value Date    EGFR 7 05/23/2025    EGFR 10 05/22/2025    EGFR 8 05/21/2025    CREATININE 7.10 (H) 05/23/2025    CREATININE 5.43 (H) 05/22/2025    CREATININE 6.59 (H) 05/21/2025     Hypotension  Suspected that his presenting episode of unresponsiveness and low BP due to low blood pressure post dialysis and fluid removal, in the setting of severe AS and cardiomyopathy, sepsis   PTA - takes midodrine prior to HD  -Now off norepi gtt  -On midodrine 15 mg TID, try to lower dose as able    -Continue holding Entresto and BB  CAD, multiple vessel  Recent PCI in March 2025  -Continue aspirin, prasugrel  Ischemic cardiomyopathy  LVEF 25-30%  LifeVest without arrhythmia or therapies delivered  -Metoprolol and Entresto held due to above    Nonrheumatic aortic valve stenosis  Moderate to severe AS  Ongoing TAVR work up at [facility], [place]    {ESEQUIEL IP Progress note specialty templates:41425}

## 2025-05-23 NOTE — ASSESSMENT & PLAN NOTE
Patient has blackened, nonhealing ulcer on left fifth digit.  Per patient, this has been present for several weeks.  XR: no radiographic evidence of osteomyelitis   Continue local wound care on discharge  Follow-up with nephrology to r/o calciphylaxis

## 2025-05-23 NOTE — HOSPITAL COURSE
"65-year-old female past medical history of end-stage renal disease on HD M/W/F, heart failure with LifeVest, DM, and CAD who presented to hospital after being found unresponsive by family the morning of 5/17.  Patient's son noted that patient typically goes to facility for HD, but recently started home HD with last fluid being pulled off.  Typically has fluid output of 4.5 to 5 L, but has only been taking out about 1 L at home.  Patient was intubated in the ED for airway protection due to hypoxia and transferred to ICU for additional monitoring.  Patient briefly required Levophed due to persistent hypotension.  EF significant for 25%.  Has notable severe aortic stenosis with TAVR pending 5/29/2025.  Patient remained stable in the ICU and was able to be extubated and weaned off Levophed.  However, continued to require midodrine 15 mg 3 times daily due to hypotension.  Patient would also occasionally require additional doses of albumin, especially after dialysis.  Throughout course of hospitalization, patient had waxing and waning mental status and would occasionally become combative and required soft restraints.  Patient previously on Depakote, but family asked this medication to be removed from medication list.  Patient would repeatedly make statements such as \"I want to die\" and \"I hate my life.\"  Palliative care was brought on board, however patient and patient's family would like to continue level 1 full code with palliative outpatient follow-up.    Both cardiology and nephrology were consulted due to history of end-stage renal disease and heart failure with severely reduced ejection fraction and aortic stenosis.  Family requested that patient be weaned off midodrine as able, however due to patient's low blood pressures this was difficult to achieve.    Patient's imaging also concerning for possible atypical pneumonia.  Patient completed IV cefepime course and was also started on Doxy.  "

## 2025-05-23 NOTE — DISCHARGE SUMMARY
Discharge Summary - Hospitalist   Name: Asad Galloway 65 y.o. male I MRN: [id number]  Unit/Bed#: S -01 I Date of Admission: 5/17/2025   Date of Service: 5/23/2025 I Hospital Day: 6     Assessment & Plan  Hypotension  Patient requiring norepinephrine to maintain maps above 65.  Borderline BP.  Low blood blood pressure postdialysis and fluid removal that day in the setting of severe AS and cardiomyopathy.  Also consideration of sepsis/infection, suspected pneumonia on CT.  Sepsis versus some component of cardiogenic shock     Plan:  Continue midodrine 15 mg 3 times daily  Will follow-up with cardiology next week  Dialysis completed 5/23   Patient level 2 per pt/ot but family would like to take patient home  History of stroke in adulthood  Thrombosis of left MCA in 2018  No residual symptoms, except cognitive impairment (per notes)  Poor historian  Continue aspirin  Anemia  Likely 2/2 to CKD  Epogen c/I in the setting of CVA  ESRD on dialysis ([facility])  Lab Results   Component Value Date    EGFR 7 05/23/2025    EGFR 10 05/22/2025    EGFR 8 05/21/2025    CREATININE 7.10 (H) 05/23/2025    CREATININE 5.43 (H) 05/22/2025    CREATININE 6.59 (H) 05/21/2025   Follow-up w/ nephrology outpatient  CAD, multiple vessel  [facility] 90% pLAD stenosis, severe plaque burden, in-stent restenosis with areas of underexpansion and calcified nodules in vision between overlap of stents --> POBA with drug-coated balloon for in-stent restenosis, no new stent  Status post PCI to LAD  Continue aspirin, prasugrel with recent PCI  noted to be Plavix nonresponder at OSH with history of in-stent restenosis  Ischemic cardiomyopathy  Metoprolol, entresto on hold due to low blood pressure   CHF with acute exacerbation, LVEF 30 to 35%.  Acute volume overload due to poor HD sessions.  Up 10K from dry weight.  Aortic stenosis, moderate to severe, ongoing TAVR workup [facility] [place]   Echo 5/18: LVEF 25%. Systolic  "function is severely reduced.  Moderate/severe AAS, mild/moderate TR     Plan:  Cardiology appointment next week; resume GDMT if blood pressures allow  Mood disorder (HCC)  Unspecified mood disorder, patient previously on Depakote. Was discontinued during his previous admission in [facility] due to concerns for thrombocytopenia.   Avoid QTC prolonging medications due to QTC of 550  Absence of toe of right foot (HCC)  Chronic wounds, continue local wound care on discharge   Chronic systolic heart failure (HCC)  Wt Readings from Last 3 Encounters:   05/23/25 93.5 kg (206 lb 2.1 oz)   05/02/25 94.3 kg (208 lb)   05/01/25 94.8 kg (209 lb)     Cardiology following  Most recent EF 25% with evidence of severe aortic stenosis   Resume GDMT if blood pressures allow; will follow-up with cardiology next week  Chronic deep vein thrombosis (DVT) of distal vein of right lower extremity (HCC)  On Eliquis 2.5 mg BID  Anemia due to chronic kidney disease, on chronic dialysis ([facility])  Lab Results   Component Value Date    EGFR 7 05/23/2025    EGFR 10 05/22/2025    EGFR 8 05/21/2025    CREATININE 7.10 (H) 05/23/2025    CREATININE 5.43 (H) 05/22/2025    CREATININE 6.59 (H) 05/21/2025     Cirrhosis (HCC)  Cirrhotic liver morphology on CT  Sepsis (HCC)  2/2 atypical PNA  Continue antibiotics as described in \"Pneumonia\"   Pneumonia  Treated for atypical PNA in ICU on cefepime and doxy; last day of Cefepime 5/21  Continue doxy on discharge   Nonrheumatic aortic valve stenosis  Plan for TAVR 5/29/2025  Patient to follow with cardiology next week  Encounter for dialysis (HCC)  Nephro following  Dialysis MWF; continue outpatient HD  Palliative care by specialist  Palliative on board  Full code - level 1  Life vest   QT prolongation  QTc prolonged at 575 as of 5/20  Magnesium currently 2, but given that patient has CHF and a prolonged QTc, patient is at high risk of torsades.  Will give additional dose of IV magnesium 2 mg  Finger " lesion  Patient has blackened, nonhealing ulcer on left fifth digit.  Per patient, this has been present for several weeks.  XR: no radiographic evidence of osteomyelitis   Continue local wound care on discharge  Follow-up with nephrology to r/o calciphylaxis      Medical Problems       Resolved Problems  Date Reviewed: 5/20/2025          Resolved    Hypothermia 5/19/2025     Resolved by  Belén Calle MD    Acute hypoxic respiratory failure (HCC) 5/19/2025     Resolved by  Belén Calle MD    Elevated troponin 5/19/2025     Resolved by  Belén Calle MD    Altered mental status 5/19/2025     Resolved by  Belén Calle MD        Discharging Physician / Practitioner: Aniyah Kaur DO  PCP: BRITTANY Allen  Admission Date:   Admission Orders (From admission, onward)       Ordered        05/17/25 0355  INPATIENT ADMISSION  Once                          Discharge Date: 05/23/25    Next Steps for Physician/AP Assuming Care:  Decreased dose of midodrine as able in the setting of hypotension secondary to cirrhosis and heart failure with reduced ejection fraction  GOC discussion regarding approaching end-stage HFrEF and severe AS     Test Results Pending at Discharge (will require follow up):  None    Medication Changes for Discharge & Rationale:   Complete doxy course for atypical PNA   See after visit summary for reconciled discharge medications provided to patient and/or family.     Consultations During Hospital Stay:  Cardiology  Nephrology  Palliative care    Procedures Performed:   Hemodialysis Monday Wednesday Friday    Significant Findings / Test Results:   None    Incidental Findings:   None    Hospital Course:   Asad Galloway is a 65 y.o. male patient who originally presented to the hospital on 5/17/2025 due to episode of unresponsiveness    65-year-old female past medical history of end-stage renal disease on HD M/W/F, heart failure with LifeVest, DM, and CAD who presented to hospital after being found  "unresponsive by family the morning of 5/17.  Patient's son noted that patient typically goes to facility for HD, but recently started home HD with last fluid being pulled off.  Typically has fluid output of 4.5 to 5 L, but has only been taking out about 1 L at home.  Patient was intubated in the ED for airway protection due to hypoxia and transferred to ICU for additional monitoring.  Patient briefly required Levophed due to persistent hypotension.  EF significant for 25%.  Has notable severe aortic stenosis with TAVR pending 5/29/2025.  Patient remained stable in the ICU and was able to be extubated and weaned off Levophed.  However, continued to require midodrine 15 mg 3 times daily due to hypotension.  Patient would also occasionally require additional doses of albumin, especially after dialysis.  Throughout course of hospitalization, patient had waxing and waning mental status and would occasionally become combative and required soft restraints.  Patient previously on Depakote, but family asked this medication to be removed from medication list.  Patient would repeatedly make statements such as \"I want to die\" and \"I hate my life.\"  Palliative care was brought on board, however patient and patient's family would like to continue level 1 full code with palliative outpatient follow-up.    Both cardiology and nephrology were consulted due to history of end-stage renal disease and heart failure with severely reduced ejection fraction and aortic stenosis.  Family requested that patient be weaned off midodrine as able, however due to patient's low blood pressures this was difficult to achieve.    Patient's imaging also concerning for possible atypical pneumonia.  Patient completed IV cefepime course and was also started on Doxy. Patient will be discharged on doxy to complete standard of care antibiotic course for atypical PNA.     At time of discharge, patient reports significant improvement in symptoms since time of " "admission.  Patient is comfortable with and understanding of discharge preparations, plans, and appropriate follow-ups.  Patient and family agreeable to discharge.  All questions answered to the best of my ability and strict return precautions discussed.     Please see above list of diagnoses and related plan for additional information.     Discharge Day Visit / Exam:   Subjective:  Overnight, night team reports patient was very upset about being given valproic acid and B12. Asked team to remove these medications from his list.     Vitals: Blood Pressure: (!) 128/47 (05/23/25 1130)  Pulse: 82 (05/23/25 1130)  Temperature: 97.8 °F (36.6 °C) (05/23/25 0900)  Temp Source: Oral (05/21/25 2154)  Respirations: 16 (05/23/25 1130)  Height: 5' 9\" (175.3 cm) (05/18/25 0909)  Weight - Scale: 93.5 kg (206 lb 2.1 oz) (05/23/25 0537)  SpO2: 95 % (05/23/25 0747)  Physical Exam  Vitals and nursing note reviewed.   Constitutional:       General: He is not in acute distress.     Appearance: He is well-developed.   HENT:      Head: Normocephalic and atraumatic.     Eyes:      Conjunctiva/sclera: Conjunctivae normal.       Cardiovascular:      Rate and Rhythm: Normal rate and regular rhythm.      Heart sounds: Murmur heard.   Pulmonary:      Effort: Pulmonary effort is normal. No respiratory distress.      Comments: Auscultated from front  Abdominal:      Palpations: Abdomen is soft.      Tenderness: There is no abdominal tenderness.     Musculoskeletal:         General: No swelling.      Cervical back: Neck supple.     Skin:     General: Skin is warm and dry.      Capillary Refill: Capillary refill takes less than 2 seconds.     Neurological:      Mental Status: He is alert. Mental status is at baseline.     Psychiatric:      Comments: agitated          Discussion with Family: Updated  (wife) at bedside.    Discharge instructions/Information to patient and family:   See after visit summary for information provided to " patient and family.      Provisions for Follow-Up Care:  See after visit summary for information related to follow-up care and any pertinent home health orders.      Mobility at time of Discharge:   Basic Mobility Inpatient Raw Score: 12  JH-HLM Goal: 4: Move to chair/commode  JH-HLM Achieved: 7: Walk 25 feet or more  HLM Goal NOT achieved. Continue to encourage mobility in post discharge setting.     Disposition:   Home with VNA Services (Reminder: Complete face to face encounter)    Planned Readmission: None    Administrative Statements   Discharge Statement:  I have spent a total time of 40 minutes in caring for this patient on the day of the visit/encounter. .    **Please Note: This note may have been constructed using a voice recognition system**

## 2025-05-23 NOTE — DISCHARGE INSTR - AVS FIRST PAGE
Dear Asad Galloway,     It was our pleasure to care for you here at [facility].  It is our hope that we were always able to exceed the expected standards for your care during your stay.  You were hospitalized due to hypotension .  You were cared for on the 3rd floor by Aniyah Kaur DO under the service of Luis Abbasi DO with the [facility] who covers for your primary care physician (PCP), BRITTANY Allen, while you were hospitalized.  If you have any questions or concerns related to this hospitalization, you may contact us at .  For follow up as well as any medication refills, we recommend that you follow up with your primary care physician.  A registered nurse will reach out to you by phone within a few days after your discharge to answer any additional questions that you may have after going home.  However, at this time we provide for you here, the most important instructions / recommendations at discharge:     Notable Medication Adjustments -   Please START taking one tablet doxycycline 100 mg by mouth every 12 hours for 3 doses to complete your treatment for atypical pneumonia.  Please START taking one tablet Midodrine 15 mg by mouth daily three times per day before meals due to low blood pressure. You may discuss decreasing this medication with your outpatient cardiologist.   Please STOP taking Entresto due to your low blood pressures.  Please STOP taking metoprolol succinate due to low blood pressures.   Important follow up information -   Please follow-up with your PCP within a week of discharge  Please follow-up with palliative care on discharge for further goals of care discussions. Please call [phone] to make an appointment.    Please follow-up with nephrology outpatient on discharge. Please call us at [phone] to schedule your appointment.  Please follow-up with cardiology outpatient on 5/27/2025 at 12  PM at [facility] cardi[facility] associates in [place].  Other Instructions -   Please take all your medications regularly as directed  If symptoms return/persist contact your PCP if unable then visit to nearest ER  Please review this entire after visit summary as additional general instructions including medication list, appointments, activity, diet, any pertinent wound care, and other additional recommendations from your care team that may be provided for you.      Sincerely,     Aniyah Kaur, DO

## 2025-05-23 NOTE — QUICK NOTE
"Got a message from nurse that family is adamant to discontinue Vitamin B12 supplement and Depakote sprinkles because they make him feel sick. Family stated that they had \"told multiple providers for the past few days and will rogelio the hospital if they are not removed from his medication list.\" Upon chart review, patient taking Depakote sprinkles for unspecified mood disorder.  Held the medications for now and will inform day team. Informed nurse.   "

## 2025-05-23 NOTE — CASE MANAGEMENT
Case Management Discharge Planning Note    Patient name Asad Galloway  Location S /S -01 MRN [id number]  : 1960 Date 2025       Current Admission Date: 2025  Current Admission Diagnosis:Hypotension   Patient Active Problem List    Diagnosis Date Noted    Finger lesion 2025    Shock ([facility]) 2025    Goals of care, counseling/discussion 2025    Nonrheumatic aortic valve stenosis 2025    Encounter for dialysis ([facility]) 2025    Palliative care by specialist 2025    Sepsis ([facility]) 2025    Pneumonia 2025    Sarcopenia 2025    Impaired mobility and ADLs 2025    Hyperkalemia 2025    Hypotension 2025    Cirrhosis ([facility]) 2025    Fluid overload 2025    Chronic kidney disease-mineral and bone disorder 2025    Anemia due to chronic kidney disease, on chronic dialysis ([facility]) 2025    HFrEF (heart failure with reduced ejection fraction) ([facility]) 2025    SIRS (systemic inflammatory response syndrome) ([facility]) 2025    Acute encephalopathy 2025    Peripheral arterial disease ([facility]) 2025    Type 2 diabetes mellitus ([facility]) 2024    Ambulatory dysfunction 2024    Thrombocytopenia ([facility]) 2024    Chronic deep vein thrombosis (DVT) of distal vein of right lower extremity ([facility]) 2024    Bicytopenia 2024    Obesity (BMI 30.0-34.9) 2024    QT prolongation 2024    Acute embolism and thrombosis of right peroneal vein ([facility]) 2024    Right ankle pain 2024    Pre-diabetes 2024    Hypertensive heart and chronic kidney disease with heart failure and with stage 5 chronic kidney disease, or end stage renal disease ([facility]) 2024    PAD (peripheral artery disease) ([facility]) 2024    Chronic systolic heart failure ([facility]) 2024    Edema of left lower extremity 2023    Rectal bleeding 2023    Presence of external cardiac defibrillator 2023     Diabetic polyneuropathy associated with type 2 diabetes mellitus (HCC) 03/07/2023    Absence of toe of right foot (HCC) 03/07/2023    Hammer toes of both feet 03/07/2023    Ischemic cardiomyopathy 02/02/2023    Aortic stenosis 02/02/2023    Mood disorder (HCC) 02/02/2023    Old myocardial infarction 02/02/2023    Hyponatremia 02/01/2023    Fall 01/06/2023    SOB (shortness of breath) 10/21/2022    Left arm swelling 10/21/2022    CAD, multiple vessel 07/14/2022    Electrolyte abnormality 05/02/2022    Chest pain 05/01/2022    Generalized weakness 04/19/2022    Primary hypertension 04/06/2022    Emotional lability 01/19/2022    History of 2019 novel coronavirus disease (COVID-19) 12/26/2020    ESRD on dialysis ([facility]) 12/26/2020    Anemia 10/05/2020    S/P arteriovenous (AV) fistula creation 04/27/2020    Pre-kidney transplant, listed 04/27/2020    Urge incontinence 12/20/2019    Anxiety associated with depression 02/28/2019    History of stroke in adulthood 10/04/2018    Abnormal EEG 09/24/2018    Other constipation 08/17/2018    GERD (gastroesophageal reflux disease) 08/13/2018    Elevated alkaline phosphatase level 08/03/2018    Nephrotic range proteinuria 03/28/2018    Dizziness 02/26/2016    Mixed hyperlipidemia 02/26/2016    Antiplatelet or antithrombotic long-term use 02/26/2016      LOS (days): 6  Geometric Mean LOS (GMLOS) (days): 4.9  Days to GMLOS:-1.4     OBJECTIVE:  Risk of Unplanned Readmission Score: 50.25         Current admission status: Inpatient   Preferred Pharmacy:   [id number] [place] - [address]  [address] [place] [address]  Phone: [phone] Fax: [phone]    Primary Care Provider: BRITTANY Allen    Primary Insurance: MEDICARE  Secondary Insurance: [place] [employer]    DISCHARGE DETAILS:        DME Referral Provided  Referral made for DME?: Yes  DME referral completed for the following items:: Other  DME Supplier Name::  [employer]    Other Referral/Resources/Interventions Provided:  Referral Comments: Pankajszaira chair ordered in parachute.

## 2025-05-23 NOTE — ASSESSMENT & PLAN NOTE
Metoprolol, entresto on hold due to low blood pressure   CHF with acute exacerbation, LVEF 30 to 35%.  Acute volume overload due to poor HD sessions.  Up 10K from dry weight.  Aortic stenosis, moderate to severe, ongoing TAVR workup [facility]   Echo 5/18: LVEF 25%. Systolic function is severely reduced.  Moderate/severe AAS, mild/moderate TR     Plan:  Cardiology appointment next week; resume GDMT if blood pressures allow

## 2025-05-23 NOTE — ASSESSMENT & PLAN NOTE
Unspecified mood disorder, patient previously on Depakote. Was discontinued during his previous admission in [facility] due to concerns for thrombocytopenia.   Avoid QTC prolonging medications due to QTC of 550

## 2025-05-23 NOTE — ASSESSMENT & PLAN NOTE
3/17/25 ([facility] [place]) - [facility] 90% pLAD stenosis, severe plaque burden, in-stent restenosis with areas of underexpansion and calcified nodules in vision between overlap of stents --> POBA with drug-coated balloon for in-stent restenosis, no new stent  Status post PCI to LAD  Continue aspirin, prasugrel with recent PCI  noted to be Plavix nonresponder at OSH with history of in-stent restenosis

## 2025-05-23 NOTE — ASSESSMENT & PLAN NOTE
Lab Results   Component Value Date    EGFR 7 05/23/2025    EGFR 10 05/22/2025    EGFR 8 05/21/2025    CREATININE 7.10 (H) 05/23/2025    CREATININE 5.43 (H) 05/22/2025    CREATININE 6.59 (H) 05/21/2025   Follow-up w/ nephrology outpatient

## 2025-05-23 NOTE — ASSESSMENT & PLAN NOTE
Wt Readings from Last 3 Encounters:   05/23/25 93.5 kg (206 lb 2.1 oz)   05/02/25 94.3 kg (208 lb)   05/01/25 94.8 kg (209 lb)     Cardiology following  Most recent EF 25% with evidence of severe aortic stenosis   Resume GDMT if blood pressures allow; will follow-up with cardiology next week

## 2025-05-23 NOTE — ASSESSMENT & PLAN NOTE
Lab Results   Component Value Date    EGFR 7 05/23/2025    EGFR 10 05/22/2025    EGFR 8 05/21/2025    CREATININE 7.10 (H) 05/23/2025    CREATININE 5.43 (H) 05/22/2025    CREATININE 6.59 (H) 05/21/2025   #ESRD on HD MWF:  Dialysis unit/days: [facility]   Access:AV fistula  EDW 95 kg,  Weight prior to treatment today was 93.5 kg.  Established to target weight if needed at discharge  Fluid restriction less than 1.5 L/day.  Patient has a history of noncompliance to fluid restriction  Reinforced compliance with dialysis  Hemodialysis today.  Patient was agitated and combative at the start of treatment.  I saw the patient shortly after he was placed on treatment he was calmer but continues to have labile emotions crying at times.    HEMODIALYSIS PROCEDURE NOTE  The patient was seen and examined on hemodialysis.  Time: 3.5 hours  Sodium: 135 Blood flow: 400   Dialyzer: F180 Potassium: 3 Dialysate flow: 600   Access: AVF Bicarbonate: 35 Ultrafiltration goal: 2L   Medications on HD: None

## 2025-05-23 NOTE — ASSESSMENT & PLAN NOTE
QTc prolonged at 575 as of 5/20  Magnesium currently 2, but given that patient has CHF and a prolonged QTc, patient is at high risk of torsades.  Will give additional dose of IV magnesium 2 mg

## 2025-05-23 NOTE — PROGRESS NOTES
Progress Note - Nephrology   Name: Asad Galloway 65 y.o. male I MRN: [id number]  Unit/Bed#: S -01 I Date of Admission: 5/17/2025   Date of Service: 5/23/2025 I Hospital Day: 6     Assessment & Plan  ESRD on dialysis (HCC)  Lab Results   Component Value Date    EGFR 7 05/23/2025    EGFR 10 05/22/2025    EGFR 8 05/21/2025    CREATININE 7.10 (H) 05/23/2025    CREATININE 5.43 (H) 05/22/2025    CREATININE 6.59 (H) 05/21/2025   #ESRD on HD MWF:  Dialysis unit/days: [facility]   Access:AV fistula  EDW 95 kg,  Weight prior to treatment today was 93.5 kg.  Established to target weight if needed at discharge  Fluid restriction less than 1.5 L/day.  Patient has a history of noncompliance to fluid restriction  Reinforced compliance with dialysis  Hemodialysis today.  Patient was agitated and combative at the start of treatment.  I saw the patient shortly after he was placed on treatment he was calmer but continues to have labile emotions crying at times.    HEMODIALYSIS PROCEDURE NOTE  The patient was seen and examined on hemodialysis.  Time: 3.5 hours  Sodium: 135 Blood flow: 400   Dialyzer: F180 Potassium: 3 Dialysate flow: 600   Access: AVF Bicarbonate: 35 Ultrafiltration goal: 2L   Medications on HD: None      Anemia  Hemoglobin stable:  8.6.  Below target for ESRD  Patient on Mircera as an outpatient  Hypotension  Noncompliant with fluid restriction as an outpatient.  Patient has underlying cardiomyopathy and severe aortic stenosis.  Ultrafiltration as blood pressure allows  Chronically low  Continue midodrine 15 mg 3 times daily  History of stroke in adulthood    Mood disorder (HCC)  Labile moods swings  Absence of toe of right foot (HCC)    Chronic deep vein thrombosis (DVT) of distal vein of right lower extremity (HCC)    Anemia due to chronic kidney disease, on chronic dialysis (HCC)  -- Mircera as an outpatient hemoglobin stable  Cirrhosis (HCC)    Sepsis ([facility])  -- Management as per critical care  Pneumonia  --  Treated with IV cefepime.  Last dose 5/21  Goals of care, counseling/discussion  -- Ongoing  Nonrheumatic aortic valve stenosis  -- Patient plan for surgery at [facility] next week  Encounter for dialysis ([facility])  -- Plan for dialysis this afternoon  Palliative care by specialist    QT prolongation    Finger lesion      I have reviewed the nephrology recommendations including hemodialysis today, with primary service, and we are in agreement with renal plan including the information outlined above.     Subjective       Review of systems: Agitated at the beginning of dialysis which happens.  He becomes physically combative.  Family request restraints which are not a desirable approach to the problem.  He did calm down and accepted dialysis.  No complaints during my visit    Objective :  Temp:  [97.8 °F (36.6 °C)-98 °F (36.7 °C)] 97.8 °F (36.6 °C)  HR:  [50-92] 84  BP: ()/(36-73) 125/48  Resp:  [16] 16  SpO2:  [84 %-98 %] 95 %    Current Weight: Weight - Scale: 93.5 kg (206 lb 2.1 oz)  First Weight: Weight - Scale: 107 kg (235 lb 7.2 oz)  I/O         05/21 0701  05/22 0700 05/22 0701  05/23 0700 05/23 0701  05/24 0700    P.O. 560 680     I.V. (mL/kg) 400 (4.4)  200 (2.1)    Other 400      IV Piggyback       Total Intake(mL/kg) 1360 (14.8) 680 (7.3) 200 (2.1)    Urine (mL/kg/hr) 0 (0) 0 (0)     Other 1598      Stool  0     Total Output 1598 0     Net -238 +680 +200           Unmeasured Urine Occurrence  1 x     Unmeasured Stool Occurrence  1 x           Physical Exam  Vitals and nursing note reviewed.   Constitutional:       General: He is not in acute distress.     Appearance: He is well-developed.   HENT:      Head: Normocephalic and atraumatic.     Eyes:      Conjunctiva/sclera: Conjunctivae normal.       Cardiovascular:      Rate and Rhythm: Normal rate and regular rhythm.      Heart sounds: Murmur heard.   Pulmonary:      Effort: Pulmonary effort is normal. No respiratory distress.      Breath sounds:  Normal breath sounds.   Abdominal:      Palpations: Abdomen is soft.      Tenderness: There is no abdominal tenderness.     Musculoskeletal:         General: No swelling.      Cervical back: Neck supple.      Right lower leg: Edema present.      Left lower leg: Edema present.      Comments: Trace/mild     Skin:     General: Skin is warm and dry.      Capillary Refill: Capillary refill takes less than 2 seconds.      Coloration: Skin is not jaundiced or pale.      Findings: No erythema or rash.     Neurological:      General: No focal deficit present.      Mental Status: He is alert.     Psychiatric:         Mood and Affect: Mood is anxious. Affect is labile.         Speech: Speech normal.         Behavior: Behavior is agitated and aggressive.         Judgment: Judgment is impulsive.         Medications:  Current Medications[1]      Lab Results: I have reviewed the following results:  Results from last 7 days   Lab Units 05/23/25  0519 05/22/25  0604 05/21/25  0600 05/20/25  0524 05/19/25  0321 05/18/25  1139 05/18/25  0432 05/17/25  1700 05/17/25  0828 05/17/25  0502 05/17/25  0334 05/17/25  0334 05/17/25  0305   WBC Thousand/uL 4.80 4.06* 5.26 9.05 9.33  --  6.72  --   --  6.11   < > 4.96  --    HEMOGLOBIN g/dL 8.6* 8.1* 8.2* 8.6* 8.7* 9.5* 8.5*   < >  --  9.8*   < > 9.7*  --    I STAT HEMOGLOBIN g/dl  --   --   --   --   --   --   --   --   --   --   --   --  11.2*   HEMATOCRIT % 27.7* 26.3* 26.3* 27.3* 27.9* 29.6* 27.3*   < >  --  31.0*   < > 31.1*  --    HEMATOCRIT, ISTAT %  --   --   --   --   --   --   --   --   --   --   --   --  33*   PLATELETS Thousands/uL 89* 82* 93* 119* 104*  --  77*  --   --  95*   < > 91*  --    POTASSIUM mmol/L 4.4 4.3 4.8 4.2 4.6  --  4.6  --  3.8 5.2   < > 5.0  --    CHLORIDE mmol/L 96 97 97 95* 93*  --  94*  --  100 93*   < > 92*  --    CO2 mmol/L 29 28 28 30 29  --  31  --  27 29   < > 28  --    CO2, I-STAT mmol/L  --   --   --   --   --   --   --   --   --   --   --   --  27  "  BUN mg/dL 42* 31* 39* 26* 36*  --  24  --  30* 32*   < > 32*  --    CREATININE mg/dL 7.10* 5.43* 6.59* 4.86* 6.11*  --  4.34*  --  4.31* 4.94*   < > 5.02*  --    CALCIUM mg/dL 8.7 8.1* 8.3* 8.2* 8.1*  --  7.9*  --  6.5* 7.7*   < > 7.8*  --    MAGNESIUM mg/dL 2.1 2.0 2.1 2.0  --   --  2.1  --  1.7* 2.1   < > 2.1  --    PHOSPHORUS mg/dL  --   --  4.8* 3.8 4.8*  --  3.8  --  4.4* 5.5*  --   --   --    ALBUMIN g/dL  --   --  3.8 3.8  --   --   --   --   --  3.8  --  3.8  --    GLUCOSE, ISTAT mg/dl  --   --   --   --   --   --   --   --   --   --   --   --  152*    < > = values in this interval not displayed.       Administrative Statements     Portions of the record may have been created with voice recognition software. Occasional wrong word or \"sound a like\" substitutions may have occurred due to the inherent limitations of voice recognition software. Read the chart carefully and recognize, using context, where substitutions have occurred.If you have any questions, please contact the dictating provider.       [1]   Current Facility-Administered Medications:     acetaminophen (TYLENOL) oral suspension 650 mg, 650 mg, Oral, Q6H PRN, Belén Calle MD, 650 mg at 05/18/25 0645    apixaban (ELIQUIS) tablet 2.5 mg, 2.5 mg, Oral, BID, Belén Calle MD, 2.5 mg at 05/22/25 1942    aspirin chewable tablet 81 mg, 81 mg, Oral, Daily, Belén Calle MD, 81 mg at 05/23/25 0828    atorvastatin (LIPITOR) tablet 40 mg, 40 mg, Oral, Daily With Dinner, Belén Calle MD, 40 mg at 05/21/25 1718    chlorhexidine (PERIDEX) 0.12 % oral rinse 15 mL, 15 mL, Mouth/Throat, Q12H [facility], 15 mL at 05/21/25 0942    doxycycline hyclate (VIBRAMYCIN) capsule 100 mg, 100 mg, Oral, Q12H [facility] hospital, Belén Calle MD, 100 mg at 05/23/25 0828    magnesium sulfate 2 g/50 mL IVPB (premix) 2 g, 2 g, Intravenous, Once, Aniyah Kaur,     melatonin tablet 3 mg, 3 mg, Oral, HS, Artemio GUERLINE Moralez MD, 3 mg at 05/22/25 1942    [Held by provider] metoprolol succinate " (TOPROL-XL) 24 hr tablet 25 mg, 25 mg, Oral, BID, Belén Calle MD    midodrine (PROAMATINE) tablet 10 mg, 10 mg, Oral, Before Dialysis, Belén Calle MD, 10 mg at 05/23/25 0828    midodrine (PROAMATINE) tablet 15 mg, 15 mg, Oral, TID AC, Belén Calle MD, 15 mg at 05/23/25 0534    polyethylene glycol (MIRALAX) packet 17 g, 17 g, Oral, Daily, Artemio Moralez MD, 17 g at 05/23/25 0828    prasugrel (EFFIENT) tablet 10 mg, 10 mg, Oral, Daily, Belén Calle MD, 10 mg at 05/22/25 1130    senna-docusate sodium (SENOKOT S) 8.6-50 mg per tablet 2 tablet, 2 tablet, Oral, BID, Aniyah Kaur DO

## 2025-05-23 NOTE — ASSESSMENT & PLAN NOTE
Lab Results   Component Value Date    EGFR 7 05/23/2025    EGFR 10 05/22/2025    EGFR 8 05/21/2025    CREATININE 7.10 (H) 05/23/2025    CREATININE 5.43 (H) 05/22/2025    CREATININE 6.59 (H) 05/21/2025

## 2025-05-23 NOTE — PLAN OF CARE
Post-Dialysis RN Treatment Note    Blood Pressure:  Pre:  105/73 mm/Hg  Post: 122/54 mmHg   EDW:  TBD   Weight:  Pre: 93.5 kg   Post: 91.5 kg   Mode of weight measurement: Bed Scale   Volume Removed:  2000 ml    Treatment duration: 210 minutes    NS given:  No    Treatment shortened No   Medications given during Rx: Midodrine    Estimated Kt/V:  Not Applicable   Access type: AV fistula   Needle Gauge: 15 gauge   Access Issues: No    Report called to primary nurse:   Loyda Peterson    Problem: METABOLIC, FLUID AND ELECTROLYTES - ADULT  Goal: Electrolytes maintained within normal limits  Description: INTERVENTIONS:  - Monitor labs and assess patient for signs and symptoms of electrolyte imbalances  - Administer electrolyte replacement as ordered  - Monitor response to electrolyte replacements, including repeat lab results as appropriate  - Instruct patient on fluid and nutrition as appropriate  Outcome: Progressing  Goal: Fluid balance maintained  Description: INTERVENTIONS:  - Monitor labs   - Monitor I/O and WT  - Instruct patient on fluid and nutrition as appropriate  - Assess for signs & symptoms of volume excess or deficit  Outcome: Progressing

## 2025-05-23 NOTE — ASSESSMENT & PLAN NOTE
Patient requiring norepinephrine to maintain maps above 65.  Borderline BP.  Low blood blood pressure postdialysis and fluid removal that day in the setting of severe AS and cardiomyopathy.  Also consideration of sepsis/infection, suspected pneumonia on CT.  Sepsis versus some component of cardiogenic shock     Plan:  Continue midodrine 15 mg 3 times daily  Will follow-up with cardiology next week  Dialysis completed 5/23   Patient level 2 per pt/ot but family would like to take patient home

## 2025-05-23 NOTE — CASE MANAGEMENT
Case Management Discharge Planning Note    Patient name Asad Galloway  Location S /S -01 MRN [id number]  : 1960 Date 2025       Current Admission Date: 2025  Current Admission Diagnosis:Hypotension   Patient Active Problem List    Diagnosis Date Noted    Finger lesion 2025    Shock ([facility]) 2025    Goals of care, counseling/discussion 2025    Nonrheumatic aortic valve stenosis 2025    Encounter for dialysis ([facility]) 2025    Palliative care by specialist 2025    Sepsis ([facility]) 2025    Pneumonia 2025    Sarcopenia 2025    Impaired mobility and ADLs 2025    Hyperkalemia 2025    Hypotension 2025    Cirrhosis ([facility]) 2025    Fluid overload 2025    Chronic kidney disease-mineral and bone disorder 2025    Anemia due to chronic kidney disease, on chronic dialysis ([facility]) 2025    HFrEF (heart failure with reduced ejection fraction) ([facility]) 2025    SIRS (systemic inflammatory response syndrome) ([facility]) 2025    Acute encephalopathy 2025    Peripheral arterial disease ([facility]) 2025    Type 2 diabetes mellitus ([facility]) 2024    Ambulatory dysfunction 2024    Thrombocytopenia ([facility]) 2024    Chronic deep vein thrombosis (DVT) of distal vein of right lower extremity ([facility]) 2024    Bicytopenia 2024    Obesity (BMI 30.0-34.9) 2024    QT prolongation 2024    Acute embolism and thrombosis of right peroneal vein ([facility]) 2024    Right ankle pain 2024    Pre-diabetes 2024    Hypertensive heart and chronic kidney disease with heart failure and with stage 5 chronic kidney disease, or end stage renal disease ([facility]) 2024    PAD (peripheral artery disease) ([facility]) 2024    Chronic systolic heart failure ([facility]) 2024    Edema of left lower extremity 2023    Rectal bleeding 2023    Presence of external cardiac defibrillator 2023     Diabetic polyneuropathy associated with type 2 diabetes mellitus (HCC) 03/07/2023    Absence of toe of right foot (HCC) 03/07/2023    Hammer toes of both feet 03/07/2023    Ischemic cardiomyopathy 02/02/2023    Aortic stenosis 02/02/2023    Mood disorder (HCC) 02/02/2023    Old myocardial infarction 02/02/2023    Hyponatremia 02/01/2023    Fall 01/06/2023    SOB (shortness of breath) 10/21/2022    Left arm swelling 10/21/2022    CAD, multiple vessel 07/14/2022    Electrolyte abnormality 05/02/2022    Chest pain 05/01/2022    Generalized weakness 04/19/2022    Primary hypertension 04/06/2022    Emotional lability 01/19/2022    History of 2019 novel coronavirus disease (COVID-19) 12/26/2020    ESRD on dialysis ([facility]) 12/26/2020    Anemia 10/05/2020    S/P arteriovenous (AV) fistula creation 04/27/2020    Pre-kidney transplant, listed 04/27/2020    Urge incontinence 12/20/2019    Anxiety associated with depression 02/28/2019    History of stroke in adulthood 10/04/2018    Abnormal EEG 09/24/2018    Other constipation 08/17/2018    GERD (gastroesophageal reflux disease) 08/13/2018    Elevated alkaline phosphatase level 08/03/2018    Nephrotic range proteinuria 03/28/2018    Dizziness 02/26/2016    Mixed hyperlipidemia 02/26/2016    Antiplatelet or antithrombotic long-term use 02/26/2016      LOS (days): 6  Geometric Mean LOS (GMLOS) (days): 4.9  Days to GMLOS:-1.4     OBJECTIVE:  Risk of Unplanned Readmission Score: 50.25         Current admission status: Inpatient   Preferred Pharmacy:   [facility]/pharm[facility] #[id number] - [place] - [address]  [address]  [place] [address]  Phone: [phone] Fax: [phone]    Primary Care Provider: BRITTANY Allen    Primary Insurance: MEDICARE  Secondary Insurance: [place] [employer]    DISCHARGE DETAILS:    Per [facility]IM, Pt is medically ready for discharge.    SV transport requested in [employer].     [facility] [place] to  inform them of Pts discharge to home today.  They are aware that Pt still has a [employer]. They do not need any clinical information faxed over.     Pt does have a new chair time which is 8:15AM, MWF. CM updated family at bedside.

## 2025-05-24 ENCOUNTER — HOME CARE VISIT (OUTPATIENT)
Dept: HOME HEALTH SERVICES | Facility: HOME HEALTHCARE | Age: 65
End: 2025-05-24
Payer: MEDICARE

## 2025-05-24 VITALS
HEART RATE: 70 BPM | BODY MASS INDEX: 31.41 KG/M2 | SYSTOLIC BLOOD PRESSURE: 106 MMHG | WEIGHT: 207.23 LBS | RESPIRATION RATE: 16 BRPM | HEIGHT: 68 IN | OXYGEN SATURATION: 97 % | DIASTOLIC BLOOD PRESSURE: 60 MMHG | TEMPERATURE: 97.7 F

## 2025-05-24 PROCEDURE — G0299 HHS/HOSPICE OF RN EA 15 MIN: HCPCS

## 2025-05-26 ENCOUNTER — HOME CARE VISIT (OUTPATIENT)
Dept: HOME HEALTH SERVICES | Facility: HOME HEALTHCARE | Age: 65
End: 2025-05-26
Payer: MEDICARE

## 2025-05-26 VITALS
HEART RATE: 100 BPM | RESPIRATION RATE: 18 BRPM | DIASTOLIC BLOOD PRESSURE: 62 MMHG | SYSTOLIC BLOOD PRESSURE: 102 MMHG | TEMPERATURE: 97.6 F

## 2025-05-26 PROCEDURE — G0300 HHS/HOSPICE OF LPN EA 15 MIN: HCPCS

## 2025-05-27 ENCOUNTER — APPOINTMENT (OUTPATIENT)
Facility: CLINIC | Age: 65
End: 2025-05-27
Payer: MEDICARE

## 2025-05-27 ENCOUNTER — HOME CARE VISIT (OUTPATIENT)
Dept: HOME HEALTH SERVICES | Facility: HOME HEALTHCARE | Age: 65
End: 2025-05-27
Payer: MEDICARE

## 2025-05-27 ENCOUNTER — OFFICE VISIT (OUTPATIENT)
Dept: HEMATOLOGY ONCOLOGY | Facility: MEDICAL CENTER | Age: 65
End: 2025-05-27
Payer: MEDICARE

## 2025-05-27 ENCOUNTER — TELEMEDICINE (OUTPATIENT)
Dept: CARDIOLOGY CLINIC | Facility: CLINIC | Age: 65
End: 2025-05-27
Payer: MEDICARE

## 2025-05-27 VITALS
RESPIRATION RATE: 18 BRPM | HEART RATE: 72 BPM | BODY MASS INDEX: 29.92 KG/M2 | DIASTOLIC BLOOD PRESSURE: 64 MMHG | TEMPERATURE: 97.2 F | WEIGHT: 202 LBS | SYSTOLIC BLOOD PRESSURE: 112 MMHG | HEIGHT: 69 IN

## 2025-05-27 VITALS
DIASTOLIC BLOOD PRESSURE: 70 MMHG | BODY MASS INDEX: 29.92 KG/M2 | WEIGHT: 202 LBS | HEIGHT: 69 IN | SYSTOLIC BLOOD PRESSURE: 108 MMHG | HEART RATE: 75 BPM

## 2025-05-27 VITALS — HEART RATE: 88 BPM | OXYGEN SATURATION: 91 % | DIASTOLIC BLOOD PRESSURE: 66 MMHG | SYSTOLIC BLOOD PRESSURE: 94 MMHG

## 2025-05-27 VITALS — OXYGEN SATURATION: 91 % | HEART RATE: 88 BPM

## 2025-05-27 DIAGNOSIS — I50.22 CHRONIC SYSTOLIC HEART FAILURE (HCC): Primary | ICD-10-CM

## 2025-05-27 DIAGNOSIS — N18.6 ESRD ON DIALYSIS (HCC): ICD-10-CM

## 2025-05-27 DIAGNOSIS — I25.5 ISCHEMIC CARDIOMYOPATHY: ICD-10-CM

## 2025-05-27 DIAGNOSIS — E78.2 MIXED HYPERLIPIDEMIA: ICD-10-CM

## 2025-05-27 DIAGNOSIS — Z99.2 ESRD ON DIALYSIS (HCC): ICD-10-CM

## 2025-05-27 DIAGNOSIS — Z95.810 PRESENCE OF EXTERNAL CARDIAC DEFIBRILLATOR: ICD-10-CM

## 2025-05-27 DIAGNOSIS — I35.0 NONRHEUMATIC AORTIC VALVE STENOSIS: ICD-10-CM

## 2025-05-27 DIAGNOSIS — Z79.02 ANTIPLATELET OR ANTITHROMBOTIC LONG-TERM USE: ICD-10-CM

## 2025-05-27 DIAGNOSIS — I50.20 HFREF (HEART FAILURE WITH REDUCED EJECTION FRACTION) (HCC): ICD-10-CM

## 2025-05-27 DIAGNOSIS — I82.451 ACUTE EMBOLISM AND THROMBOSIS OF RIGHT PERONEAL VEIN (HCC): Primary | ICD-10-CM

## 2025-05-27 DIAGNOSIS — I25.10 CAD, MULTIPLE VESSEL: ICD-10-CM

## 2025-05-27 PROCEDURE — G0151 HHCP-SERV OF PT,EA 15 MIN: HCPCS

## 2025-05-27 PROCEDURE — 99214 OFFICE O/P EST MOD 30 MIN: CPT | Performed by: INTERNAL MEDICINE

## 2025-05-27 PROCEDURE — G0152 HHCP-SERV OF OT,EA 15 MIN: HCPCS

## 2025-05-27 NOTE — PROGRESS NOTES
[facility]    Name:Asad Galloway   DOS: 5/27/2025     Chief Complaint:   Chief Complaint   Patient presents with    Hospital Follow-up     D/C SLA 5/23 - discuss possibility of removing Lifevest, discuss medication concerns.        HISTORY OF PRESENT ILLNESS:    HPI:  Asad Galloway is a 65 y.o. male. He  has a past medical history of Cerebrovascular accident (CVA) due to thrombosis of left middle cerebral artery ([facility]) (07/29/2018), Chronic kidney disease, Coronary artery disease, Diabetes mellitus ([facility]), Dialysis patient ([facility]), Fistula, GERD (gastroesophageal reflux disease), History of coronary artery stent placement, Hypercholesteremia, Hyperlipidemia, Hypertension, Infectious viral hepatitis, Limb alert care status, Neuropathy, Nonhealing ulcer of heel ([facility]) (04/25/2023), Obesity, Osteomyelitis ([facility]), Penetrating foot wound, left, initial encounter (01/19/2022), PVC's (premature ventricular contractions), Stroke ([facility]), TIA (transient ischemic attack) (10/28/2018), Ulcer of left heel, limited to breakdown of skin ([facility]) (06/13/2024), Wears dentures, and Wears glasses.    Active issues:  Ischemic cardiomyopathy  Chronic HF with mid-range EF (partially recovered to 45-50%) - Lowest EF 25% 1/30/2023.  Complex coronary artery disease - S/P multiple stents with history of in-stent stenosis and thrombosis on Plavix. Also with reported history of stent complications on Ticagrelor, although records are not available to support that. Has been maintained on Prasugrel despite history of CVA.   Moderate aortic stenosis   ESRD on HD - History of angina limiting dialysis sessions before his beta blocker was optimized.   History of DVT - Now chronically on Eliquis.   CVA     I first met Mr. Galloway in January 2023. He was admitted January 29, 2023. Prior to this, he had historically followed with a Cardiologist in [place] at [facility]. He was admitted in 1/2023 due to sudden onset of shortness of breath  also with chest pressure/pain.  He was in acute respiratory failure requiring BiPAP in the ED and was eventually transferred to the ICU for management of pulmonary edema. He underwent volume removal with renal replacement therapy. He was also found to have high sensitivity troponins at that time. An echocardiogram at that time showed reduction in LVEF to 25 to 30%.  Last known EF prior to this was reportedly normal.  He was transferred to our [facility] to undergo cardiac catheterization on 2/1/23, and was found to have progression of coronary artery disease compared to cardiac catheterization in May 2022.  He had in-stent stenosis of the LAD, for which he received PCI and placement of a ADE. He was also found to have an occluded OM stent (placed 5/2022), which could not be wired thus unable to revascularize.  Also with occlusion of the RPAV branch. He was initiated on medical therapy for heart failure, and discharged with a LifeVest. Subsequent echocardiogram showed improvement in LVEF with echo in February 2023 at [facility] showing EF of 35 to 40%.  Repeat echo within our network on 4/24/2023 showed EF of 35%. He was referred EP for ICD evaluation, but this was ultimately deferred due to high risk of infection in the setting of hemodialysis and possible immunosuppression when he gets renal transplant. Lower extremity osteomyelitis also raised concern for a device infection.     Cardiac Catheterization 2/1/23    1st Mrg lesion is 100% stenosed.    RPAV lesion is 100% stenosed.    Mid LAD lesion is 80% stenosed.    RPDA lesion is 60% stenosed.    The angioplasty was pre-stent angioplasty.    The angioplasty was pre-stent angioplasty.     Multivessel CAD with marked progression since the prior angiographic study in May 2022.  Stents placed in the mid LAD exhibited significant discrete and-stent restenosis.  The first OM branch, stented in 5/22, has become occluded.  The distal RCA  beyond the PDA has akso become occluded.  LAD in--stent restenosis was interrogated by IVUS imaging and successfully treated with placement of a 4.0x13mm Orsiro ADE.  An attempt was made to wire the occluded OM branch, but the wire could not be advanced beyond the stent in mid vessel.      Nuclear Stress 4/14/2022    Stress ECG: A pharmacological stress test was performed using regadenoson. The patient experienced no angina during the test.    Stress ECG: The stress ECG is negative for ischemia after pharmacologic stress.    Perfusion: There is a left ventricular perfusion defect that is medium in size with severe reduction in uptake present in the basal to mid inferior and inferolateral location(s) consistent with prior scar.    Stress Function: Left ventricular function post-stress is normal. Post-stress ejection fraction is 46 %. There is a defect in the basal to mid inferior and inferolateral location(s).    Stress Combined Conclusion: Findings are consistent with inferolateral infarction. There is mild reversibility to this defect, suggesting a small amount of mona-infarct ischemia in the affected territories.    He is being evaluated via telemedicine this afternoon after recent hospitalization.  He was admitted to [facility] between May 17 and May 23 of this year for an episode of unresponsiveness due to hypotension.  He had recently been switched to a trial of home dialysis, and seemingly became hypotensive in this process resulting in his hospitalization.  He required intubation upon arrival, was treated with vasopressors shortly as well as antibiotics for what was considered to be a pneumonia on imaging.  Repeat echo in the hospital demonstrated persistent moderate to severe reduction in LV systolic function, estimated at 25% although my personal review of the images closer to 30-35% and very similar to his prior echo from 1 month before.    Today, being evaluated through  the epic embedded video  platform he appears alert and oriented in no apparent distress.  He tells me that he has had no chest pain or shortness of breath, and also denies diaphoresis, dizziness, palpitations.  Tells me that his sleep has been poor recently.  His son Sam confirms that he is doing better overall since leaving the hospital.  They expressed concern regarding the dose of midodrine that he was sent home on, noting that he is now taking 15 mg of midodrine 3 times daily on both dialysis and nondialysis days.      TAVR scheduled July 1st with Dr. Dipti FAN    ROS: Pertinent positives and negatives as described in History of Present Illness. Remainder of a 14 point review of systems was negative.     Allergies[1]     Medications Ordered Prior to Encounter[2]    Past Medical History[3]    Past Surgical History[4]    Family History[5]    Social History     Socioeconomic History    Marital status: /Civil Union     Spouse name: Not on file    Number of children: Not on file    Years of education: Not on file    Highest education level: Not asked   Occupational History    Occupation: disabled   Tobacco Use    Smoking status: Never    Smokeless tobacco: Never   Vaping Use    Vaping status: Never Used   Substance and Sexual Activity    Alcohol use: Never    Drug use: No    Sexual activity: Not Currently     Partners: Female     Comment: defer   Other Topics Concern    Not on file   Social History Narrative    Daily caffeine consumption 2-3 servings a day     Social Drivers of Health     Financial Resource Strain: Patient Declined (7/13/2023)    Overall Financial Resource Strain (CARDIA)     Difficulty of Paying Living Expenses: Patient declined   Food Insecurity: No Food Insecurity (5/22/2025)    Nursing - Inadequate Food Risk Classification     Worried About Running Out of Food in the Last Year: Never true     Ran Out of Food in the Last Year: Never true     Ran Out of Food in the Last Year: Never true  "  Transportation Needs: No Transportation Needs (2025)    OASIS : Transportation     Lack of Transportation (Medical): No     Lack of Transportation (Non-Medical): No     Patient Unable or Declines to Respond: No   Physical Activity: Not on file   Stress: No Stress Concern Present (2019)    [place] [employer] - Occupational Stress Questionnaire     Feeling of Stress : Only a little   Social Connections: Unknown (2019)    Social Connection and Isolation Panel     Frequency of Communication with Friends and Family: Not on file     Frequency of Social Gatherings with Friends and Family: Not on file     Attends Religion Services: Not on file     Active Member of Clubs or Organizations: Not on file     Attends Club or Organization Meetings: Not on file     Marital Status:    Intimate Partner Violence: Unknown (2025)    Nursing IPS     Feels Physically and Emotionally Safe: Not on file     Physically Hurt by Someone: Not on file     Humiliated or Emotionally Abused by Someone: Not on file     Physically Hurt by Someone: No     Hurt or Threatened by Someone: No   Housing Stability: Unknown (2025)    Nursing: Inadequate Housing Risk Classification     Has Housing: Not on file     Worried About Losing Housing: Not on file     Unable to Get Utilities: Not on file     Unable to Pay for Housing in the Last Year: No     Has Housin       OBJECTIVE:    /70 (BP Location: Right arm, Patient Position: Sitting, Cuff Size: Standard)   Pulse 75 Comment: stated  Ht 5' 9\" (1.753 m)   Wt 91.6 kg (202 lb)   BMI 29.83 kg/m²      BP Readings from Last 3 Encounters:   25 108/70   25 112/64   25 102/62       Wt Readings from Last 3 Encounters:   25 91.6 kg (202 lb)   25 91.6 kg (202 lb)   25 94 kg (207 lb 3.7 oz)         Physical Exam  Telemed                                                  LABS:  Lab Results   Component Value Date    " "GLUCOSE 152 (H) 05/17/2025    BUN 42 (H) 05/23/2025    CREATININE 7.10 (H) 05/23/2025    CALCIUM 8.7 05/23/2025     08/10/2016    K 4.4 05/23/2025    CO2 29 05/23/2025    CL 96 05/23/2025    ALKPHOS 116 (H) 05/21/2025    BILITOT 0.6 08/10/2016    PROT 6.7 08/10/2016    AST 7 (L) 05/21/2025    ALT 5 (L) 05/21/2025    ANIONGAP 9 01/03/2016        Lab Results   Component Value Date    WBC 4.80 05/23/2025    HGB 8.6 (L) 05/23/2025    HCT 27.7 (L) 05/23/2025     (H) 05/23/2025    PLT 89 (L) 05/23/2025       Lab Results   Component Value Date    CHOL 203 (H) 08/10/2016    HDL 30 (L) 01/30/2023    LDLCALC 21 01/30/2023    TRIG 41 05/17/2025       Lab Results   Component Value Date    HGBA1C 5.4 04/10/2025       No results found for: \"TSH\"        ASSESSMENT/PLAN:  Diagnoses and all orders for this visit:    Chronic systolic heart failure (HCC)    CAD, multiple vessel    Nonrheumatic aortic valve stenosis    HFrEF (heart failure with reduced ejection fraction) (HCC)    Ischemic cardiomyopathy    ESRD on dialysis (HCC)    Antiplatelet or antithrombotic long-term use    Mixed hyperlipidemia    Presence of external cardiac defibrillator    This is a 65-year-old male patient very well-known to me from prior encounters, presenting for follow-up evaluation.  His chronic cardiac conditions are presently stable, he has no anginal complaints and he is clinically euvolemic from a heart failure standpoint, NYHA class II although functional capacity is fairly difficult to ascertain due to baseline ambulatory dysfunction. He is undergoing TAVR evaluation in [place] as noted above.      Plan:  Continue Aspirin, Eliquis, and Prasugrel given recent PCI.  Continue GDMT, Toprol (dose reduced compared to prior). Presently off of Entresto.   Continue high intensity statin.   Continue LifeVest. EP follow up to be arranged to discuss ICD candidacy.   On-going TAVR evaluation in [place].           Britney Tan MD FACC      Portions of the " "record may have been created with voice recognition software. Occasional wrong word or \"sound alike\" substitutions may have occurred due to the inherent limitations of voice recognition software. Please review the chart carefully and recognize, using context, where substitutions/typographical errors may have occurred.     Administrative Statements   Encounter provider Britney Tan MD    The Patient is located at Home and in the following state in which I hold an active license [place].    The patient was identified by name and date of birth. Asad Galloway was informed that this is a telemedicine visit and that the visit is being conducted through the Epic Embedded platform. He agrees to proceed..  My office door was closed. No one else was in the room.  He acknowledged consent and understanding of privacy and security of the video platform. The patient has agreed to participate and understands they can discontinue the visit at any time.    I have spent a total time of 20 minutes in caring for this patient on the day of the visit/encounter including Diagnostic results, Prognosis, Risks and benefits of tx options, Patient and family education, Importance of tx compliance, Counseling / Coordination of care, Documenting in the medical record, and Obtaining or reviewing history  , not including the time spent for establishing the audio/video connection.           [1] No Known Allergies  [2]   Current Outpatient Medications on File Prior to Visit   Medication Sig Dispense Refill    apixaban (Eliquis) 2.5 mg Take 1 tablet (2.5 mg total) by mouth 2 (two) times a day 60 tablet 3    aspirin 81 mg chewable tablet Chew 1 tablet in the morning.      atorvastatin (LIPITOR) 40 mg tablet Take 1 tablet (40 mg total) by mouth daily with dinner 90 tablet 3    midodrine (PROAMATINE) 5 mg tablet Take 3 tablets (15 mg total) by mouth 3 (three) times a day before meals 810 tablet 0    prasugrel (EFFIENT) tablet Take 1 tablet (10 mg " total) by mouth daily 90 tablet 3    Vitamin D3 1.25 MG (39916 UT) capsule TAKE 1 CAPSULE BY MOUTH ONE TIME PER WEEK 12 capsule 4    midodrine (PROAMATINE) 10 MG tablet Take 1 tablet (10 mg total) by mouth Before Dialysis (before dialysis) (Patient not taking: Reported on 5/24/2025) 12 tablet 0     No current facility-administered medications on file prior to visit.   [3]   Past Medical History:  Diagnosis Date    Cerebrovascular accident (CVA) due to thrombosis of left middle cerebral artery ([facility]) 07/29/2018    Chronic kidney disease     Coronary artery disease     Diabetes mellitus ([facility])     not on meds    Dialysis patient ([facility])     M-W-F    Fistula     left upper arm for hemodialyis    GERD (gastroesophageal reflux disease)     History of coronary artery stent placement     x3    Hypercholesteremia     Hyperlipidemia     Hypertension     Infectious viral hepatitis     B as child    Limb alert care status     no BP/IV left arm    Neuropathy     Nonhealing ulcer of heel ([facility]) 04/25/2023    Obesity     Osteomyelitis ([facility])     last assessed 11/4/16-per son not currently    Penetrating foot wound, left, initial encounter 01/19/2022    PVC's (premature ventricular contractions)     sees  Cardio    Stroke ([facility])     last weeof July 2018 [facility]    TIA (transient ischemic attack) 10/28/2018    Ulcer of left heel, limited to breakdown of skin ([facility]) 06/13/2024    Wears dentures     Wears glasses    [4]   Past Surgical History:  Procedure Laterality Date    ABDOMINAL SURGERY      CARDIAC CATHETERIZATION N/A 05/02/2022    Procedure: Cardiac Coronary Angiogram;  Surgeon: Sam Davis MD;  [facility]: [facility] CARD[facility];  Service: Cardiology    CARDIAC CATHETERIZATION N/A 05/02/2022    Procedure: Cardiac pci;  Surgeon: Sam Davis MD;  [facility];  Service: Cardiology    CARDIAC CATHETERIZATION  02/01/2023    Procedure: Cardiac catheterization;  Surgeon: Sam Davis MD;  Location: [facility]  "CARDIAC CATH LAB;  Service: Cardiology    CARDIAC CATHETERIZATION N/A 02/01/2023    Procedure: Cardiac pci;  Surgeon: Sam Davis MD;  Location: [facility] CARDIAC [facility];  Service: Cardiology    CARDIAC CATHETERIZATION N/A 02/01/2023    Procedure: Cardiac Coronary Angiogram;  Surgeon: Sam Davis MD;  Location: [facility] CARDIAC [facility]ATH [facility];  Service: Cardiology    CARDIAC CATHETERIZATION N/A 02/01/2023    Procedure: Cardiac other-IVUS;  Surgeon: Sam Davis MD;  Location: [facility] CARDIAC CATH [facility];  Service: Cardiology    CHOLECYSTECTOMY      Percutaneous    COLONOSCOPY      CORONARY ANGIOPLASTY WITH STENT PLACEMENT      CYSTOSCOPY      HEMODIALYSIS ADULT  1/22/2024    HEMODIALYSIS ADULT  01/24/2024    HEMODIALYSIS ADULT  5/19/2025    IR LOWER EXTREMITY ANGIOGRAM  9/29/2023    IR LOWER EXTREMITY ANGIOGRAM  02/26/2024    OTHER SURGICAL HISTORY      \"stimulator to control bowel movements\"    [place] ESOPHAGOGASTRODUODENOSCOPY TRANSORAL DIAGNOSTIC N/A 09/27/2016    Procedure: ESOPHAGOGASTRODUODENOSCOPY (EGD);  Surgeon: Adele Rowe MD;  Location: [facility] GI LAB;  Service: Gastroenterology    [place] LAPAROSCOPY SURG CHOLECYSTECTOMY N/A 02/29/2016    Procedure: LAPAROSCOPIC CHOLECYSTECTOMY ;  Surgeon: Cliff Roman DO;  Location: [facility] Main OR;  Service: General    [place] SLCTV CATHJ 3RD+ ORD SLCTV ABDL PEL/LXTR BRNCH Left 9/29/2023    Procedure: Left leg angiogram with intervention;  Surgeon: Michelle Galvan MD;  Location: [facility] MA[facility];  Service: Vascular    [place] SLCTV CATHJ 3RD+ ORD SLCTV ABDL PEL/LXTR BRNCH Left 02/26/2024    Procedure: Left leg angiogram antegrade access, left popliteal angioplasty;  Surgeon: Michelle Galvan MD;  Location: [facility];  Service: Vascular    ROTATOR CUFF REPAIR Right     SPINAL CORD STIMULATOR IMPLANT      \"Medtronic interstim model # 3058- in lower back to control bowel movements-currently turned off-battery is dead\"    TOE AMPUTATION Right 10/28/2016    Procedure: 3RD TOE AMPUTATION ;  Surgeon: Anjel" CORINA Salas;  Location: [facility];  Service:    [5]   Family History  Problem Relation Name Age of Onset    Leukemia Mother      Liver disease Mother      Lung cancer Mother          heavy smoker - 3 ppd    Heart disease Father Son     Liver disease Father Son     Diabetes type I Father Son     Multiple myeloma Sister      Breast cancer Sister      Urolithiasis Family      Alcohol abuse Neg Hx      Depression Neg Hx      Drug abuse Neg Hx      Substance Abuse Neg Hx      Mental illness Neg Hx

## 2025-05-27 NOTE — PROGRESS NOTES
Asad Galloway  1960  [facility] [address] [place] [facility] [id number]    DISCUSSION/SUMMARY:    Approximately 35 minutes was spent with patient/family in direct consultation, physical examination and review of the chart.    65-year-old male with multiple medical issues:    Lower extremity DVT.  Patient is on Eliquis 2.5 mg twice a day; also on aspirin 81 mg a day and Effient 10 mg a day.  The second 2 medications are cardiac.  No bruising or bleeding issues.  Recent hemoglobin level is not particularly good but patient has multiple comorbidities; no recent excessive bruising or any bleeding issues.  The hemoglobin level is okay/acceptable.  The plan is to continue with the anticoagulation.  Patient understands that all of his blood thinning medications need to be manipulated for any invasive procedure or surgery (TVAR scheduled for the future).  Patient should speak to his cardiologist to make sure that 48 hours is sufficient to discontinue the Eliquis.  From a hematology standpoint, the plan is to monitor the CBC parameters.    Recent pneumonia, history of CVA, CAD, ischemic cardiomyopathy, heart failure, nonrheumatic aortic valve stenosis, QT prolongation - as per hospitalist and cardiology.    Follow-up with renal/dialysis is ongoing.    CBC will be checked every 2 weeks.    Patient is to return in 6 weeks.  Patient knows to call the hematology/oncology office if there are any other questions or concerns.    Carefully review your medication list and verify that the list is accurate and up-to-date. Please call the hematology/oncology office if there are medications missing from the list, medications on the list that you are not currently taking or if there is a dosage or instruction that is different from how you're taking that medication.    Patient goals and areas of care: Anticoagulation  Barriers to care: Multiple  comorbidities  Patient is not able to self-care  _____________________________________________________________________________________    Chief Complaint   Patient presents with    Follow-up    DVT     History of Present Illness: 65-year-old male with very complicated medical history.  Patient has a history of CVA, stent placement, end-stage renal disease, congestive heart failure, anemia, hyperlipidemia, catheter induced DVT in the left neck.  Patient is followed by cardiology through [place].  Patient with recent diagnosis of lower extremity DVT.  Besides aspirin and Effient, patient is now on Eliquis 2.5 mg every 12 hours.  Patient returns with his son and wife.    Mr. Galloway recently fell out of his car; patient was admitted.  Patient was treated for end-stage renal disease, CAD, ischemic cardiomyopathy, systolic heart failure, cirrhosis, sepsis, pneumonia, QT prolongation.  Patient was recently discharged from the hospital.    Presently Mr. Galloway states feeling +/-, closer to baseline.  Dialysis is now being performed at one of the dialysis centers (not at home).  No bruising or bleeding.  No problems with the 3 anticoagulation medications.  No shortness of breath at rest, + dyspnea on exertion but no different than baseline.  No fevers or signs of infection.  Appetite is +/-, weight is stable.    Review of Systems   Constitutional:  Positive for activity change, fatigue and unexpected weight change.   HENT: Negative.     Eyes: Negative.    Respiratory: Negative.     Cardiovascular:  Positive for leg swelling.   Gastrointestinal: Negative.    Endocrine: Negative.    Genitourinary: Negative.    Musculoskeletal: Negative.    Skin: Negative.    Allergic/Immunologic: Negative.    Neurological: Negative.    Hematological: Negative.    Psychiatric/Behavioral: Negative.     All other systems reviewed and are negative.    Problem List[1]  Past Medical History[2]  Past Surgical History[3]  Family History[4]  Social  History     Socioeconomic History    Marital status: /Civil Union     Spouse name: Not on file    Number of children: Not on file    Years of education: Not on file    Highest education level: Not asked   Occupational History    Occupation: disabled   Tobacco Use    Smoking status: Never    Smokeless tobacco: Never   Vaping Use    Vaping status: Never Used   Substance and Sexual Activity    Alcohol use: Never    Drug use: No    Sexual activity: Not Currently     Partners: Female     Comment: defer   Other Topics Concern    Not on file   Social History Narrative    Daily caffeine consumption 2-3 servings a day     Social Drivers of Health     Financial Resource Strain: Patient Declined (7/13/2023)    Overall Financial Resource Strain (CARDIA)     Difficulty of Paying Living Expenses: Patient declined   Food Insecurity: No Food Insecurity (5/22/2025)    Nursing - Inadequate Food Risk Classification     Worried About Running Out of Food in the Last Year: Never true     Ran Out of Food in the Last Year: Never true     Ran Out of Food in the Last Year: Never true   Transportation Needs: No Transportation Needs (5/24/2025)    OASIS : Transportation     Lack of Transportation (Medical): No     Lack of Transportation (Non-Medical): No     Patient Unable or Declines to Respond: No   Physical Activity: Not on file   Stress: No Stress Concern Present (1/18/2019)    [employer] - Occupational Stress Questionnaire     Feeling of Stress : Only a little   Social Connections: Unknown (1/18/2019)    Social Connection and Isolation Panel     Frequency of Communication with Friends and Family: Not on file     Frequency of Social Gatherings with Friends and Family: Not on file     Attends Faith Services: Not on file     Active Member of Clubs or Organizations: Not on file     Attends Club or Organization Meetings: Not on file     Marital Status:    Intimate Partner Violence: Unknown  (2025)    Nursing IPS     Feels Physically and Emotionally Safe: Not on file     Physically Hurt by Someone: Not on file     Humiliated or Emotionally Abused by Someone: Not on file     Physically Hurt by Someone: No     Hurt or Threatened by Someone: No   Housing Stability: Unknown (2025)    Nursing: Inadequate Housing Risk Classification     Has Housing: Not on file     Worried About Losing Housing: Not on file     Unable to Get Utilities: Not on file     Unable to Pay for Housing in the Last Year: No     Has Housin     Current Medications[5]    Allergies[6]    Vitals:    25 1116   BP: 112/64   Pulse: 72   Resp: 18   Temp: (!) 97.2 °F (36.2 °C)     Physical Exam  Constitutional:       Appearance: He is well-developed.   HENT:      Head: Normocephalic and atraumatic.      Right Ear: External ear normal.      Left Ear: External ear normal.     Eyes:      Conjunctiva/sclera: Conjunctivae normal.      Pupils: Pupils are equal, round, and reactive to light.       Cardiovascular:      Rate and Rhythm: Normal rate and regular rhythm.      Heart sounds: Normal heart sounds.   Pulmonary:      Effort: Pulmonary effort is normal.      Breath sounds: Normal breath sounds.      Comments: Scattered bilateral rhonchi, rales at bases bilaterally  Abdominal:      General: Bowel sounds are normal.      Palpations: Abdomen is soft.     Musculoskeletal:         General: Normal range of motion.      Cervical back: Normal range of motion and neck supple.     Skin:     General: Skin is warm.     Neurological:      Mental Status: He is alert and oriented to person, place, and time.      Deep Tendon Reflexes: Reflexes are normal and symmetric.     Psychiatric:         Behavior: Behavior normal.         Thought Content: Thought content normal.         Judgment: Judgment normal.     Extremities: 1+ bilateral lower extremity edema, no cords, pulses are 1+  Lymphatics: No adenopathy in the neck, supraclavicular region,  axilla bilaterally    Labs        5/23/2025 BUN = 42 creatinine = 7.10    Imaging    5/17/2025 CAT scan chest abdomen pelvis without contrast    IMPRESSION:     1.  Endotracheal tube tip terminates approximately 6 cm above the brian. Scattered mild interlobular septal thickening and areas of groundglass opacities are seen bilaterally which may reflect interstitial pulmonary edema and asymmetrical alveolar   edema. Additional small areas of irregular consolidative opacities are also seen bilaterally which may reflect areas of atelectasis or early airspace disease. Recommend clinical correlation.  2.  A few nonspecific mildly prominent mediastinal and hilar lymph nodes measuring up to 1.7 x 1.1 cm noted.  3.  Subcutaneous air in the bilateral subclavian regions noted of uncertain etiology.  4.  Cirrhotic morphology of the liver and splenomegaly noted.  5.  Symmetrical renal atrophy and cortical thinning again noted bilaterally which could be related to chronic renal failure. No hydronephrosis.  6.  No evidence of bowel obstruction, inflammation, appendicitis, or free air. Moderate volume of abdominal/pelvic ascites noted.  7.  Extensive calcific atherosclerosis. Additional ancillary findings detailed above.    1/16/2025 vascular venous duplex lower limb    RIGHT LOWER LIMB  Evidence of chronic non-occlusive deep vein thrombosis in one of the pair  peroneal veins in the mid calf, however technically difficult to visualize  secondary to pitting edema.  No evidence of superficial thrombophlebitis noted.  Doppler evaluation shows a normal response to augmentation maneuvers.  Popliteal, posterior tibial and anterior tibial arterial Doppler waveforms are  triphasic/biphasic.     LEFT LOWER LIMB LIMITED  Evaluation shows no evidence of thrombus in the common femoral vein.  Doppler evaluation shows a normal response to augmentation maneuvers.       [1]   Patient Active Problem List  Diagnosis    Dizziness    Mixed  hyperlipidemia    Antiplatelet or antithrombotic long-term use    Nephrotic range proteinuria    Elevated alkaline phosphatase level    GERD (gastroesophageal reflux disease)    Other constipation    Abnormal EEG    History of stroke in adulthood    Anxiety associated with depression    Urge incontinence    S/P arteriovenous (AV) fistula creation    Pre-kidney transplant, listed    Anemia    History of 2019 novel coronavirus disease (COVID-19)    ESRD on dialysis (HCC)    Emotional lability    Primary hypertension    Generalized weakness    Chest pain    Electrolyte abnormality    CAD, multiple vessel    SOB (shortness of breath)    Left arm swelling    Fall    Hyponatremia    Ischemic cardiomyopathy    Aortic stenosis    Mood disorder (HCC)    Diabetic polyneuropathy associated with type 2 diabetes mellitus (HCC)    Absence of toe of right foot (HCC)    Hammer toes of both feet    Presence of external cardiac defibrillator    Rectal bleeding    Edema of left lower extremity    Chronic systolic heart failure (HCC)    Old myocardial infarction    Hypertensive heart and chronic kidney disease with heart failure and with stage 5 chronic kidney disease, or end stage renal disease (HCC)    PAD (peripheral artery disease) (HCC)    Pre-diabetes    Right ankle pain    Acute embolism and thrombosis of right peroneal vein (HCC)    Bicytopenia    Obesity (BMI 30.0-34.9)    QT prolongation    Chronic deep vein thrombosis (DVT) of distal vein of right lower extremity (HCC)    Thrombocytopenia (HCC)    Type 2 diabetes mellitus (HCC)    Ambulatory dysfunction    Peripheral arterial disease (HCC)    Chronic kidney disease-mineral and bone disorder    Anemia due to chronic kidney disease, on chronic dialysis (HCC)    HFrEF (heart failure with reduced ejection fraction) (HCC)    SIRS (systemic inflammatory response syndrome) (HCC)    Acute encephalopathy    Fluid overload    Hypotension    Cirrhosis (HCC)    Hyperkalemia     Sarcopenia    Impaired mobility and ADLs    Sepsis (HCC)    Pneumonia    Shock ([facility])    Goals of care, counseling/discussion    Nonrheumatic aortic valve stenosis    Encounter for dialysis ([facility])    Palliative care by specialist    Finger lesion   [2]   Past Medical History:  Diagnosis Date    Cerebrovascular accident (CVA) due to thrombosis of left middle cerebral artery ([facility]) 07/29/2018    Chronic kidney disease     Coronary artery disease     Diabetes mellitus ([facility])     not on meds    Dialysis patient ([facility])     M-W-F    Fistula     left upper arm for hemodialyis    GERD (gastroesophageal reflux disease)     History of coronary artery stent placement     x3    Hypercholesteremia     Hyperlipidemia     Hypertension     Infectious viral hepatitis     B as child    Limb alert care status     no BP/IV left arm    Neuropathy     Nonhealing ulcer of heel ([facility]) 04/25/2023    Obesity     Osteomyelitis ([facility])     last assessed 11/4/16-per son not currently    Penetrating foot wound, left, initial encounter 01/19/2022    PVC's (premature ventricular contractions)     sees [facility]    Stroke ([facility])     last weeof July 2018 [facility]    TIA (transient ischemic attack) 10/28/2018    Ulcer of left heel, limited to breakdown of skin ([facility]) 06/13/2024    Wears dentures     Wears glasses    [3]   Past Surgical History:  Procedure Laterality Date    ABDOMINAL SURGERY      CARDIAC CATHETERIZATION N/A 05/02/2022    Procedure: Cardiac Coronary Angiogram;  Surgeon: Sam Davis MD;  [facility];  Service: Cardiology    CARDIAC CATHETERIZATION N/A 05/02/2022    Procedure: Cardiac pci;  Surgeon: Sam Davis MD;  [facility];  Service: Cardiology    CARDIAC CATHETERIZATION  02/01/2023    Procedure: Cardiac catheterization;  Surgeon: Sam Davis MD;  [facility];  Service: Cardiology    CARDIAC CATHETERIZATION N/A 02/01/2023    Procedure: Cardiac pci;  Surgeon: Sam  "MD Ryan;  [facility];  Service: Cardiology    CARDIAC CATHETERIZATION N/A 02/01/2023    Procedure: Cardiac Coronary Angiogram;  Surgeon: Sam Davis MD;  [facility];  Service: Cardiology    CARDIAC CATHETERIZATION N/A 02/01/2023    Procedure: Cardiac other-IVUS;  Surgeon: Sam Davis MD;  [facility];  Service: Cardiology    CHOLECYSTECTOMY      Percutaneous    COLONOSCOPY      CORONARY ANGIOPLASTY WITH STENT PLACEMENT      CYSTOSCOPY      HEMODIALYSIS ADULT  1/22/2024    HEMODIALYSIS ADULT  01/24/2024    HEMODIALYSIS ADULT  5/19/2025    IR LOWER EXTREMITY ANGIOGRAM  9/29/2023    IR LOWER EXTREMITY ANGIOGRAM  02/26/2024    OTHER SURGICAL HISTORY      \"stimulator to control bowel movements\"    VT ESOPHAGOGASTRODUODENOSCOPY TRANSORAL DIAGNOSTIC N/A 09/27/2016    Procedure: ESOPHAGOGASTRODUODENOSCOPY (EGD);  Surgeon: Adele Rowe MD;  Location: [facility];  Service: Gastroenterology    VT LAPAROSCOPY SURG CHOLECYSTECTOMY N/A 02/29/2016    Procedure: LAPAROSCOPIC CHOLECYSTECTOMY ;  Surgeon: Cliff Roman DO;  Location: [facility];  Service: General    VT SLCTV CATHJ 3RD+ ORD SLCTV ABDL PEL/LXTR BRNCH Left 9/29/2023    Procedure: Left leg angiogram with intervention;  Surgeon: Michelle Galvan MD;  Location: [facility];  Service: Vascular    VT SLCTV CATHJ 3RD+ ORD SLCTV ABDL PEL/LXTR BRNCH Left 02/26/2024    Procedure: Left leg angiogram antegrade access, left popliteal angioplasty;  Surgeon: Michelle Galvan MD;  Location: [facility];  Service: Vascular    ROTATOR CUFF REPAIR Right     SPINAL CORD STIMULATOR IMPLANT      \"[employer]tronic interstim model # 3058- in lower back to control bowel movements-currently turned off-battery is dead\"    TOE AMPUTATION Right 10/28/2016    Procedure: 3RD TOE AMPUTATION ;  Surgeon: Anjel Salas DPM;  Location: [facility];  Service:    [4]   Family History  Problem Relation Name Age of Onset    Leukemia Mother   "    Liver disease Mother      Lung cancer Mother          heavy smoker - 3 ppd    Heart disease Father Son     Liver disease Father Son     Diabetes type I Father Son     Multiple myeloma Sister      Breast cancer Sister      Urolithiasis Family      Alcohol abuse Neg Hx      Depression Neg Hx      Drug abuse Neg Hx      Substance Abuse Neg Hx      Mental illness Neg Hx     [5]   Current Outpatient Medications:     apixaban (Eliquis) 2.5 mg, Take 1 tablet (2.5 mg total) by mouth 2 (two) times a day, Disp: 60 tablet, Rfl: 3    aspirin 81 mg chewable tablet, Chew 1 tablet in the morning., Disp: , Rfl:     atorvastatin (LIPITOR) 40 mg tablet, Take 1 tablet (40 mg total) by mouth daily with dinner, Disp: 90 tablet, Rfl: 3    midodrine (PROAMATINE) 5 mg tablet, Take 3 tablets (15 mg total) by mouth 3 (three) times a day before meals, Disp: 810 tablet, Rfl: 0    prasugrel (EFFIENT) tablet, Take 1 tablet (10 mg total) by mouth daily, Disp: 90 tablet, Rfl: 3    Vitamin D3 1.25 MG (44576 UT) capsule, TAKE 1 CAPSULE BY MOUTH ONE TIME PER WEEK, Disp: 12 capsule, Rfl: 4    midodrine (PROAMATINE) 10 MG tablet, Take 1 tablet (10 mg total) by mouth Before Dialysis (before dialysis) (Patient not taking: Reported on 5/24/2025), Disp: 12 tablet, Rfl: 0  [6] No Known Allergies

## 2025-05-28 ENCOUNTER — TELEPHONE (OUTPATIENT)
Dept: PODIATRY | Facility: CLINIC | Age: 65
End: 2025-05-28

## 2025-05-28 ENCOUNTER — HOME CARE VISIT (OUTPATIENT)
Dept: HOME HEALTH SERVICES | Facility: HOME HEALTHCARE | Age: 65
End: 2025-05-28
Payer: MEDICARE

## 2025-05-28 ENCOUNTER — TELEMEDICINE (OUTPATIENT)
Dept: GERIATRICS | Facility: OTHER | Age: 65
End: 2025-05-28
Payer: MEDICARE

## 2025-05-28 VITALS — DIASTOLIC BLOOD PRESSURE: 58 MMHG | OXYGEN SATURATION: 93 % | HEART RATE: 64 BPM | SYSTOLIC BLOOD PRESSURE: 90 MMHG

## 2025-05-28 VITALS
DIASTOLIC BLOOD PRESSURE: 58 MMHG | TEMPERATURE: 97.7 F | OXYGEN SATURATION: 93 % | SYSTOLIC BLOOD PRESSURE: 90 MMHG | RESPIRATION RATE: 16 BRPM | HEART RATE: 68 BPM

## 2025-05-28 DIAGNOSIS — I25.10 CAD, MULTIPLE VESSEL: ICD-10-CM

## 2025-05-28 DIAGNOSIS — I25.5 ISCHEMIC CARDIOMYOPATHY: ICD-10-CM

## 2025-05-28 DIAGNOSIS — E11.42 DIABETIC POLYNEUROPATHY ASSOCIATED WITH TYPE 2 DIABETES MELLITUS (HCC): ICD-10-CM

## 2025-05-28 DIAGNOSIS — I82.451 ACUTE EMBOLISM AND THROMBOSIS OF RIGHT PERONEAL VEIN (HCC): Primary | ICD-10-CM

## 2025-05-28 DIAGNOSIS — D63.1 ANEMIA DUE TO CHRONIC KIDNEY DISEASE, ON CHRONIC DIALYSIS (HCC): ICD-10-CM

## 2025-05-28 DIAGNOSIS — N18.6 ESRD ON DIALYSIS (HCC): ICD-10-CM

## 2025-05-28 DIAGNOSIS — I50.20 HFREF (HEART FAILURE WITH REDUCED EJECTION FRACTION) (HCC): ICD-10-CM

## 2025-05-28 DIAGNOSIS — Z99.2 ANEMIA DUE TO CHRONIC KIDNEY DISEASE, ON CHRONIC DIALYSIS (HCC): ICD-10-CM

## 2025-05-28 DIAGNOSIS — N18.6 ANEMIA DUE TO CHRONIC KIDNEY DISEASE, ON CHRONIC DIALYSIS (HCC): ICD-10-CM

## 2025-05-28 DIAGNOSIS — I95.0 IDIOPATHIC HYPOTENSION: ICD-10-CM

## 2025-05-28 DIAGNOSIS — Z99.2 ESRD ON DIALYSIS (HCC): ICD-10-CM

## 2025-05-28 DIAGNOSIS — R26.2 AMBULATORY DYSFUNCTION: ICD-10-CM

## 2025-05-28 LAB
DME PARACHUTE DELIVERY DATE ACTUAL: NORMAL
DME PARACHUTE DELIVERY DATE EXPECTED: NORMAL
DME PARACHUTE DELIVERY DATE REQUESTED: NORMAL
DME PARACHUTE DELIVERY NOTE: NORMAL
DME PARACHUTE ITEM DESCRIPTION: NORMAL
DME PARACHUTE ORDER STATUS: NORMAL
DME PARACHUTE SUPPLIER NAME: NORMAL
DME PARACHUTE SUPPLIER PHONE: NORMAL

## 2025-05-28 PROCEDURE — G0299 HHS/HOSPICE OF RN EA 15 MIN: HCPCS

## 2025-05-28 PROCEDURE — G0151 HHCP-SERV OF PT,EA 15 MIN: HCPCS

## 2025-05-28 PROCEDURE — 99215 OFFICE O/P EST HI 40 MIN: CPT

## 2025-05-28 NOTE — PROGRESS NOTES
OCCUPATIONAL THERAPY RE-CERTIFICATION    Today's Date: 2025  Patient Name: Asad Galloway  : 1960  MRN: [id number]  Referring Provider: Mallorie Ulloa*  Dx: H/O: stroke [Z86.73]    SKILLED ANALYSIS:  Pt is a right hand dominant 64 year old male presenting to OP OT after break from services 2* multiple hospitalizations.  Pt hasn't been seen for treatment since 2025, but has been here 2x for re-evaluations.  Pt was then unable to return to services 2* additional hospitalizations.  Pt is a poor historian, and info corrected by wife.  Wife reports pt is now requiring totalA with ADLs, with the exception of eating with Roni.  She is providing heavy assist with functional transfers and mobility.  Based on assessments, pt demonstrating improvement in functional cognition on MoCA compared to performance 2 months ago, and is scoring similarly to his performance ~6 months ago.  For his self care tasks and bed mobility, he demonstrates during OT evaluation that he is able to participate significantly, but it appears that his performance at home is primarily limited by learned helplessness and behavior. His performance is also limited by generalized weakness, balance, cognitive deficits, activity tolerance and endurance.  He is also noted to have b/l hand weakness L>R.  Recommend OP OT 2x/wk with focus on increasing independence with daily activities, decreasing caregiver burden, bed mobility, functional cognition, and functional use of b/l UE.  Discussed with pt and wife, and both are in agreement.      Discussed with wife and pt that I anticipate that his self limiting behavior and learned helplessness is a primary limitation to his independence with daily activities.  Advised wife to trial setting up pt, supervising and encouraging pt to participate. Will require assist with washing buttocks, balance during ADLs, pull pants over hips.  Wife and pt both in agreement to trial prior to next  "session.    OTR discussed positional dizziness and nystagmus with Jody Vaz DPT.    Educated pt on charges of insurance, acknowledge understanding, and are in agreement.    PLAN OF CARE START: 5/28/25  PLAN OF CARE END: 8/28/25  FREQUENCY: 2x/week  PRECAUTIONS dialysis 3x/week; NO FOOD OR WATER CAN BE GIVEN TO PATIENT; scared of dogs; do not take BP in L UE    Subjective: \"I do it myself\"- in regards to most questions regarding ADL routine.  Per wife, he is totalA for most ADLs.  She reports being fatigued and having back pain 2* caregiver burden.    Occupational Profile  Pt lives with at home with his wife and two kids (23 and 28), other two kids are  and live on their own. Pt lives in a bi-level home. There are 5 steps down and 7 steps up. Pt resides downstairs. They have a walk in shower, shower bench, grab bars, handles on toilet, and railing to assist with getting into and out of his standard bed but more recently has been sleeping in a hospital bed. Pt is a retired , he has been retired since his stroke. Pt is not driving. Pt's wife works during the day and pt is alone during that time.  Pt reports he primarily watches cartoons during that time. He was recently d/c from  OT and PT.  He goes to HD 3x/wk.  Currently presenting with LifeVest, and is planned for TAVR in early July.      Wife's report of pt's current ADL status:  -Requires max-totalA with all ADLs, except Roni for eating  -Requires heavy assist with bed mobility, functional transfers and mobility with use of rw      Pt's reports: (inaccurate)  Eating: set-up  Grooming: totalA for oral care, set-up for face washing  UB bathing: supervision   LB bathing: supervision  UB dressing: set-up   LB dressing: set-up  Toileting: reports he never urinates or has bowel movements  Bed mobility: assist from wife  Transfers: assist from wife  Mobility: assist with use of rw    No falls per pt    PATIENT GOAL: \"To do more for " "myself\"    HISTORY OF PRESENT ILLNESS:   Pt has h/o CVA in 2018 with residual functional and cognitive deficits.  He is well known to this OT and recently took a break from OP OT 2* multiple hospitalizations and subsequent rehab placement, HH OT.      PMH:   Past Medical History:   Diagnosis Date    Cerebrovascular accident (CVA) due to thrombosis of left middle cerebral artery (HCC) 07/29/2018    Chronic kidney disease     Coronary artery disease     Diabetes mellitus (HCC)     not on meds    Dialysis patient ([facility])     M-W-F    Fistula     left upper arm for hemodialyis    GERD (gastroesophageal reflux disease)     History of coronary artery stent placement     x3    Hypercholesteremia     Hyperlipidemia     Hypertension     Infectious viral hepatitis     B as child    Limb alert care status     no BP/IV left arm    Neuropathy     Nonhealing ulcer of heel ([facility]) 04/25/2023    Obesity     Osteomyelitis ([facility])     last assessed 11/4/16-per son not currently    Penetrating foot wound, left, initial encounter 01/19/2022    PVC's (premature ventricular contractions)     sees  Cardio    Stroke ([facility])     last weeof July 2018 [facility]    TIA (transient ischemic attack) 10/28/2018    Ulcer of left heel, limited to breakdown of skin ([facility]) 06/13/2024    Wears dentures     Wears glasses        Past Surgical Hx:   Past Surgical History:   Procedure Laterality Date    ABDOMINAL SURGERY      CARDIAC CATHETERIZATION N/A 05/02/2022    Procedure: Cardiac Coronary Angiogram;  Surgeon: Sam Davis MD;  [facility];  Service: Cardiology    CARDIAC CATHETERIZATION N/A 05/02/2022    Procedure: Cardiac pci;  Surgeon: Sam Davis MD;  [facility];  Service: Cardiology    CARDIAC CATHETERIZATION  02/01/2023    Procedure: Cardiac catheterization;  Surgeon: Sam Davis MD;  [facility];  Service: Cardiology    CARDIAC CATHETERIZATION N/A 02/01/2023    Procedure: Cardiac " "pci;  Surgeon: Sam Davis MD;  [facility];  Service: Cardiology    CARDIAC CATHETERIZATION N/A 02/01/2023    Procedure: Cardiac Coronary Angiogram;  Surgeon: Sam Davis MD;  [facility];  Service: Cardiology    CARDIAC CATHETERIZATION N/A 02/01/2023    Procedure: Cardiac other-IVUS;  Surgeon: Sam Davis MD;  [facility];  Service: Cardiology    CHOLECYSTECTOMY      Percutaneous    COLONOSCOPY      CORONARY ANGIOPLASTY WITH STENT PLACEMENT      CYSTOSCOPY      HEMODIALYSIS ADULT  1/22/2024    HEMODIALYSIS ADULT  01/24/2024    HEMODIALYSIS ADULT  5/19/2025    IR LOWER EXTREMITY ANGIOGRAM  9/29/2023    IR LOWER EXTREMITY ANGIOGRAM  02/26/2024    OTHER SURGICAL HISTORY      \"stimulator to control bowel movements\"    DC ESOPHAGOGASTRODUODENOSCOPY TRANSORAL DIAGNOSTIC N/A 09/27/2016    Procedure: ESOPHAGOGASTRODUODENOSCOPY (EGD);  Surgeon: Adele Rowe MD;  Location: [facility];  Service: Gastroenterology    DC LAPAROSCOPY SURG CHOLECYSTECTOMY N/A 02/29/2016    Procedure: LAPAROSCOPIC CHOLECYSTECTOMY ;  Surgeon: Cliff Roman DO;  Location: [facility];  Service: General    DC SLCTV CATHJ 3RD+ ORD SLCTV ABDL PEL/LXTR BRNCH Left 9/29/2023    Procedure: Left leg angiogram with intervention;  Surgeon: Michelle Galvan MD;  Location: [facility];  Service: Vascular    DC SLCTV CATHJ 3RD+ ORD SLCTV ABDL PEL/LXTR BRNCH Left 02/26/2024    Procedure: Left leg angiogram antegrade access, left popliteal angioplasty;  Surgeon: Michelle Galvan MD;  Location: [facility];  Service: Vascular    ROTATOR CUFF REPAIR Right     SPINAL CORD STIMULATOR IMPLANT      \"Medtronic interstim model # 3058- in lower back to control bowel movements-currently turned off-battery is dead\"    TOE AMPUTATION Right 10/28/2016    Procedure: 3RD TOE AMPUTATION ;  Surgeon: Anjel Salas DPM;  Location[facility];  Service:         Pain: FLACC 0    Objective    Upper Extremities:  Pt " is right hand dominant                MARIFER: RUE: 34lb (2025: 30lb, 2025: 43lb) LUE: 17lb (2025: 12lb, 2025: 26lb)  The age norm is approximately 76-89 lbs, indicating decreased  strength.    Manual Muscle Testing:  Date: 25        Right Left       Shoulder flexion 4/5 4-/5       Shoulder abduction 4/5 4-/5       Shoulder IR NA NA       Shoulder ER NA NA       Elbow flexion 4/5 4-/5       Elbow extension 4/5 4-/5       Forearm supination NA, ROM WFL NA, ROM WFL       Forearm pronation NA, ROM WFL NA, ROM WFL       Wrist flexion 4-/5 4-/5              PINCH STRENGTH NOT RE-ASSESSED 25              2 point pinch: R UE: 7 L UE: 1 (previously RUE: 8.5, LUE: 1.5)  3 point pinch: R UE: 5 L UE: 1 (previously RUE: 7, LUE: 1.5)  Lateral pinch: R UE: 9 L UE: 3 (previously RUE: 12, LUE: 2.5)                Range of Motion:   AROM: b/l WNL     Subluxation: none    Scapular winging: none    Spasticity: none     Finger to Nose Testing with Visual Occlusion (proprioception):  mildly dysmetric L UE NOT RE-ASSESSED 25    Finger to Nose Testing without Visual Occlusion: WNL NOT RE-ASSESSED 25    Monofilaments/sensory testing: WNL    Functional Cognition:  Highest level of education: High school    Víctor Cognitive Assessment Version 8.3 (MoCA V8.3)   Visuospatial/executive functionin/5; no points given for trail making however noted to be able to complete with 1 VC  Namin/3  Memory: 1st trial:  , 2nd trial:    Attention/concentration: 2/  List of letters:   Serial Seven Subtraction:  2/3, 3 errors  Language/sentence repetition:  0/2  Language Fluency:  0/1, 3 words   Abstract/Correlational Thinkin2  Delayed Recall:  0  Orientation:  3/6. Oriented to day, city and place (with use of environmental cues)   Memory Index Score: 1/15     Raw Score:  12+1= , MIS: 1/15, indicative of MODERATE neurocognitive deficits.  Last re-eval in 2025:  9+1= 10/30, MIS:  5/15, indicative of MODERATE neurocognitive impairments. Prior to that, pt was scoring 12-16    MoCA Scoring        Normal: 26+         Mild Cognitive Impairment: 18-25          Moderate Cognitive Impairment: 10-17         Severe Cognitive Impairment: <10      NINE-HOLE PEG TEST: assesses dexterity/fine motor coordination. Alternative scoring: the number of pegs placed in 50 or 100 seconds can be recorded.   NOT RE-ASSESSED 5/29/25    R hand: 1:13 to insert 2 pegs with multiples dropped.  Discontinued 2* pt frustration (previously 1:54 with 4 pegs dropped, 3x use of L UE despite cues to discontinue)  L hand: 26 seconds to insert 0 pegs with multiple dropped.  Discontinued 2* pt frustration. (previously 3:06 with 11 pegs dropped, 4x use of R UE despite cues to discontinue)     Norms:   Sex Age Average R Average L   Male 61-65 20.87 21.60      Pt demonstrates decreased FMC compared to norms for age/sex       FUNCTIONAL DEXTERITY TEST:   NOT RE-ASSESSED 5/29/25   R: 2:00 with 16/16 compensations (previously 1:24 with 16/16 compensations)   L: 1:53 to complete 4 pegs with 4/4 compensations then pt refuses to continue (previously 1:42 with 3 pegs dropped, 16/16 compensations)     Trail making Part A and Part B:   NOT RE-ASSESSED 5/29/25    Part A: NA (in February 2:33 with 6 cues for sequencing. Prior: 1:28 with 1 mistake corrected by the therapist)   Part B: NA 2* being unable to complete smaller version on MoCA    Indicating deficits: Part A deficit > 33.22 seconds for age related norms    Contextual Memory Test: NOT RE-ASSESSED 5/29/25    Self care:   -Doffs socks and shoes in tailor sit EOM with mildy inc time and set up   -Dons socks in tailor sit with inc time, requires cues for which shoe goes on each foot, then able to don with set-up      Bed mobility:   -Sit-> sidelying: supervision and requires inc time.  C/o mild dizziness that resolves after ~45 seconds   -Sidelying<>supine: supervision,  inc time.  C/o significant dizziness and noted to have significant nystagmus.  Resolves after ~1 min   -Sidelying -> sitting EOM with modA for trunk mgmt  -STS: completed 5x, fluctuates between modA and CGA.  Requires cues for appropriate hand positioning and min cues for carry over of education.  Uncontrolled descent 3/5 attempts (not consecutive attempts)    GOALS:   Short Term Goals:  Pt will complete all UB self care tasks with set-up and supervision (excluding fasteners).  Pt will complete LB self care tasks with Roni (excluding fasteners) per wife report.  Pt will complete bed mobility with Roni.  Pt will demonstrate improved functional cognition by scoring at least 14/30 on MoCA for carry over with daily activities.  Pt will inc b/l grasp strength by 3lb for carry over with functional activities.        Long Term Goals: 8-12 weeks  Pt will complete LB self care tasks with supervision/set-up per wife report.  Pt will complete bed mobility with Jourdan.  Pt will demonstrate improved functional cognition by scoring at least 16/30 on MoCA for carry over with daily activities.  Pt will inc b/l grasp strength by 6lb for carry over with functional activities.  Pt will inc b/l UE strength to at least 4+/5 in all planes for carry over with functional activities    OTHER PLANNED THERAPY INTERVENTIONS:   Supine, seated, and in stance neuro re-ed  Tricep AG  NMES/FES  FMC/prehension  Timed Trials  Manual tx  Hand to target  Sensory re-ed  Seated functional reach: crossing midline  Supine place and hold  WBearing strategies   Closed chain activities  Open chain activities  Internal and external memory aides  Multimatrix for saccades/ visual clutter/attention  Hypersensitivity strategies education  Multi-modal environment  Sustained/alternating/divided attention  Work stations with timed transitions  Temporal Awareness  Memory and mental manipulation  Auditory processing with immediate recall  Memory retention with immediate  and delayed recall  Edu on cog/vision apps

## 2025-05-28 NOTE — ASSESSMENT & PLAN NOTE
Hospitalized with acute CHF exacerbation. Acute volume overload due to poor HD sessions.    Aortic stenosis, moderate to severe  TAVR scheduled for 7/1/25  Echo 5/18: LVEF 25%. Systolic function is severely reduced.  Moderate/severe AAS, mild/moderate TR  Metoprolol, entresto on hold due to hypotension  Continue to monitor for acute changes  Follow up with Cardiology as scheduled.

## 2025-05-28 NOTE — ASSESSMENT & PLAN NOTE
Lab Results   Component Value Date    EGFR 7 05/23/2025    EGFR 10 05/22/2025    EGFR 8 05/21/2025    CREATININE 7.10 (H) 05/23/2025    CREATININE 5.43 (H) 05/22/2025    CREATININE 6.59 (H) 05/21/2025   HD on MWF  AV fistula  Continue FR 1.5L; of note patient not complaint.  Encourage renal diet; of note patient not complaint.  Continue all renal supplements  Continue to monitor fistula  Follow up with Nephrology

## 2025-05-28 NOTE — ASSESSMENT & PLAN NOTE
Multifactorial- acute and chronic conditions  Hx of falls  Continue PT/OT- strongly recommended patient stay with [facility] nursing, PT/OT services, patient declined and will start outpatient PT tomorrow.    Assess for need with assistance with transfers, mobility, and ADLs in/out of home.  Patient is at high risk for falls r/t ESRD on HD, neuropathy, dyspnea, polypharmacy, environmental barriers, and age.  Encourage PO nutrition and hydration.  Encourage appropriate DME use   Maintain fall and safety precautions.  Follow up with PCP

## 2025-05-28 NOTE — ASSESSMENT & PLAN NOTE
Lab Results   Component Value Date    EGFR 7 05/23/2025    EGFR 10 05/22/2025    EGFR 8 05/21/2025    CREATININE 7.10 (H) 05/23/2025    CREATININE 5.43 (H) 05/22/2025    CREATININE 6.59 (H) 05/21/2025 5/28 Hgb 9.3  Continue to monitor on routine labs, monitored through HD.  Monitor for acute bleed - no present signs  Transfuse PRN Hgb <7  Continue to monitor for acute changes  Follow up with PCP, Hem/Onc

## 2025-05-28 NOTE — TELEPHONE ENCOUNTER
Hello,    Please advise if a forced appointment can be accommodated for the patient:    Call back #: [phone]    Insurance: Medicare    Reason for appointment: wound on great toe.   Visiting Nurse is there now and she feels he needs to be seen as soon as possible.  Wound is getting bigger    Requested doctor and/or location: Franklin Church DPM/any office      Thank you.

## 2025-05-28 NOTE — ASSESSMENT & PLAN NOTE
Lab Results   Component Value Date    HGBA1C 5.4 04/10/2025   BS appear well controlled  Patient does not check BS at home  Not on medication, currently diet controlled  Continue intermittent Accu-cheks if willing.   Avoid hypotension.   Educate patient on s/s of hyper/hypoglycemia  Continue to monitor for acute changes in condition   Follow up with PCP/Endocrinology

## 2025-05-28 NOTE — ASSESSMENT & PLAN NOTE
Patient with multiple stents, recently 2/27/25 and 3/17/25  Status post PCI to LAD  Continue aspirin, prasugrel with recent PCI  noted to be Plavix nonresponder at OSH with history of in-stent restenosis  Follow up with Cardiology

## 2025-05-28 NOTE — PROGRESS NOTES
Virtual Regular Visit  Name: Asad Galloway      : 1960      MRN: [id number]  Encounter Provider: BRITTANY Ching  Encounter Date: 2025   Encounter department: [facility] VIRTUAL    Heal at Home Visit   :  Assessment & Plan  Acute embolism and thrombosis of right peroneal vein (HCC)  History of DVT RUE  Continue Eliquis and Prasugrel  Patient to go  for venous duplex  Follow up with Vascular as scheduled.       CAD, multiple vessel  Patient with multiple stents, recently 25 and 3/17/25  Status post PCI to LAD  Continue aspirin, prasugrel with recent PCI  noted to be Plavix nonresponder at OSH with history of in-stent restenosis  Follow up with Cardiology       HFrEF (heart failure with reduced ejection fraction) ([facility])  Wt Readings from Last 3 Encounters:   25 91.6 kg (202 lb)   25 91.6 kg (202 lb)   25 94 kg (207 lb 3.7 oz)   Most recent EF 25% with evidence of severe aortic stenosis   GDMT on hold due to low blood pressures  Presently on Midodrine TID  Monitor weights; patient gets weighed at HD, does not take weight at home.  Monitor volume status clinically; patient appears dry, lungs clear, no edema.  Evaluated by Cardiology on   TAVR scheduled for   Follow up with Cardiology on        Idiopathic hypotension  Patient found at home unresponsive, required ICU stay.   Patient briefly required Levophed due to persistent hypotension    Per wife patient noted to have low blood blood pressure postdialysis     Possible consideration of sepsis/infection, suspected pneumonia on CT.  Sepsis versus some component of cardiogenic shock  Continue Midodrine 15mg TID  Continue to monitor BP, today post HD 90/58  Change positions slowly  Follow up with Cardiology as scheduled.     Ischemic cardiomyopathy  Hospitalized with acute CHF exacerbation. Acute volume overload due to poor HD sessions.    Aortic stenosis, moderate to severe  TAVR scheduled for 25  Echo  5/18: LVEF 25%. Systolic function is severely reduced.  Moderate/severe AAS, mild/moderate TR  Metoprolol, entresto on hold due to hypotension  Continue to monitor for acute changes  Follow up with Cardiology as scheduled.        Diabetic polyneuropathy associated with type 2 diabetes mellitus ([facility])    Lab Results   Component Value Date    HGBA1C 5.4 04/10/2025   BS appear well controlled  Patient does not check BS at home  Not on medication, currently diet controlled  Continue intermittent Accu-cheks if willing.   Avoid hypotension.   Educate patient on s/s of hyper/hypoglycemia  Continue to monitor for acute changes in condition   Follow up with PCP/Endocrinology        ESRD on dialysis ([facility])  Lab Results   Component Value Date    EGFR 7 05/23/2025    EGFR 10 05/22/2025    EGFR 8 05/21/2025    CREATININE 7.10 (H) 05/23/2025    CREATININE 5.43 (H) 05/22/2025    CREATININE 6.59 (H) 05/21/2025   HD on MWF  AV fistula  Continue FR 1.5L; of note patient not complaint.  Encourage renal diet; of note patient not complaint.  Continue all renal supplements  Continue to monitor fistula  Follow up with Nephrology        Anemia due to chronic kidney disease, on chronic dialysis ([facility])  Lab Results   Component Value Date    EGFR 7 05/23/2025    EGFR 10 05/22/2025    EGFR 8 05/21/2025    CREATININE 7.10 (H) 05/23/2025    CREATININE 5.43 (H) 05/22/2025    CREATININE 6.59 (H) 05/21/2025 5/28 Hgb 9.3  Continue to monitor on routine labs, monitored through HD.  Monitor for acute bleed - no present signs  Transfuse PRN Hgb <7  Continue to monitor for acute changes  Follow up with PCP, Hem/Onc       Ambulatory dysfunction  Multifactorial- acute and chronic conditions  Hx of falls  Continue PT/OT- strongly recommended patient stay with VNA nursing, PT/OT services, patient declined and will start outpatient PT tomorrow.    Assess for need with assistance with transfers, mobility, and ADLs in/out of home.  Patient is at high risk for  "falls r/t ESRD on HD, neuropathy, dyspnea, polypharmacy, environmental barriers, and age.  Encourage PO nutrition and hydration.  Encourage appropriate DME use   Maintain fall and safety precautions.  Follow up with PCP            History of Present Illness     Asad Galloway is a 65 y.o. male patient, being seen for Heal at home program in conjuction with VNA nurse Estrella Guidry, who was in the home to perform physical assessment.       The patient is being seen and examined today for follow-up on acute and chronic medical conditions s/p most recent hospitalization.      Patient lives with his wife and 2 adult children who help care and support patient, he uses a walker at all times. The patient reports he is overall doing \"okay\", he just returned from HD and was eating [employer]. Patient has a new wound to R great toe (new images in media). Patient is requesting discharge form VNA services because he wants to attend outpatient PT. Nursing and I both recommended he continue with VNA services due to repeated hospitalizations, and especially because of new wound, patient and wife declined. Wife presently trying to make podiatry/wound care appointment. Patient now taking Midodrine TID due to hypotension. He denies any recent falls. He denies any issues with life vest and reports he is wearing it daily as instructed. BP soft today, per wife his BP is always low after returning from HD. He denies any further bleeding from fistula site. Patient is in no acute distress, denies CP, increased SOB from baeline, dizziness, HA, N/V/C/D.   Patient has follow up appointments with PCP on 6/18, Cardiology on 6/24, TAVR 7/1, Podiatry 7/1 and Hem/onc 7/8.       Review of Systems   Constitutional:  Positive for activity change and fatigue. Negative for chills and fever.   Respiratory:  Positive for shortness of breath (chronic). Negative for cough.    Cardiovascular:  Negative for chest pain and palpitations.   Gastrointestinal:  " Negative for abdominal pain, constipation, diarrhea, nausea and vomiting.   Genitourinary:  Negative for difficulty urinating, dysuria and hematuria.   Musculoskeletal:  Positive for gait problem. Negative for back pain.   Skin:  Positive for wound.        New R great toe wound   Neurological:  Positive for weakness. Negative for dizziness, seizures, syncope and light-headedness.   Psychiatric/Behavioral:  Positive for confusion.    All other systems reviewed and are negative.      Objective   There were no vitals taken for this visit.    Physical Exam  Vitals and nursing note reviewed.   Constitutional:       General: He is not in acute distress.     Appearance: He is well-developed.   HENT:      Head: Normocephalic and atraumatic.      Mouth/Throat:      Mouth: Mucous membranes are dry.     Eyes:      Conjunctiva/sclera: Conjunctivae normal.      Comments: Wears glasses     Cardiovascular:      Rate and Rhythm: Normal rate and regular rhythm.   Pulmonary:      Effort: Pulmonary effort is normal. No respiratory distress.      Breath sounds: Normal breath sounds. No wheezing, rhonchi or rales.   Abdominal:      Palpations: Abdomen is soft.      Tenderness: There is no abdominal tenderness.     Musculoskeletal:      Right lower leg: No edema.      Left lower leg: No edema.     Skin:     General: Skin is warm and dry.     Neurological:      Mental Status: He is alert.      Motor: Weakness present.      Coordination: Coordination abnormal.      Gait: Gait abnormal.     Psychiatric:         Mood and Affect: Mood normal.         Cognition and Memory: Cognition is impaired. Memory is impaired.         Administrative Statements   Encounter provider BRITTANY Ching    The Patient is located at Home and in the following state in which I hold an active license [place].    The patient was identified by name and date of birth. Asad Galloway was informed that this is a telemedicine visit and that the visit is being conducted  through the [employer] Teams platform. He agrees to proceed..  My office door was closed. No one else was in the room.  He acknowledged consent and understanding of privacy and security of the video platform. The patient has agreed to participate and understands they can discontinue the visit at any time.    I have spent a total time of 47 minutes in caring for this patient on the day of the visit/encounter including Instructions for management, Patient and family education, Importance of tx compliance, Risk factor reductions, Impressions, Counseling / Coordination of care, Documenting in the medical record, Reviewing/placing orders in the medical record (including tests, medications, and/or procedures), Obtaining or reviewing history  , and Communicating with other healthcare professionals , not including the time spent for establishing the audio/video connection.

## 2025-05-28 NOTE — ASSESSMENT & PLAN NOTE
Patient found at home unresponsive, required ICU stay.   Patient briefly required Levophed due to persistent hypotension    Per wife patient noted to have low blood blood pressure postdialysis     Possible consideration of sepsis/infection, suspected pneumonia on CT.  Sepsis versus some component of cardiogenic shock  Continue Midodrine 15mg TID  Continue to monitor BP, today post HD 90/58  Change positions slowly  Follow up with Cardiology as scheduled.

## 2025-05-28 NOTE — ASSESSMENT & PLAN NOTE
Wt Readings from Last 3 Encounters:   05/27/25 91.6 kg (202 lb)   05/27/25 91.6 kg (202 lb)   05/24/25 94 kg (207 lb 3.7 oz)   Most recent EF 25% with evidence of severe aortic stenosis   GDMT on hold due to low blood pressures  Presently on Midodrine TID  Monitor weights; patient gets weighed at HD, does not take weight at home.  Monitor volume status clinically; patient appears dry, lungs clear, no edema.  Evaluated by Cardiology on 5/27  TAVR scheduled for 7/1  Follow up with Cardiology on 6/24

## 2025-05-28 NOTE — ASSESSMENT & PLAN NOTE
History of DVT RUE  Continue Eliquis and Prasugrel  Patient to go 5/29 for venous duplex  Follow up with Vascular as scheduled.

## 2025-05-29 ENCOUNTER — HOSPITAL ENCOUNTER (OUTPATIENT)
Dept: NON INVASIVE DIAGNOSTICS | Facility: CLINIC | Age: 65
Discharge: HOME | End: 2025-05-29
Attending: PHYSICIAN ASSISTANT
Payer: MEDICARE

## 2025-05-29 ENCOUNTER — EVALUATION (OUTPATIENT)
Facility: CLINIC | Age: 65
End: 2025-05-29
Payer: MEDICARE

## 2025-05-29 ENCOUNTER — EVALUATION (OUTPATIENT)
Facility: CLINIC | Age: 65
End: 2025-05-29
Attending: PHYSICIAN ASSISTANT
Payer: MEDICARE

## 2025-05-29 VITALS
OXYGEN SATURATION: 93 % | TEMPERATURE: 97.7 F | SYSTOLIC BLOOD PRESSURE: 90 MMHG | HEART RATE: 62 BPM | DIASTOLIC BLOOD PRESSURE: 58 MMHG | RESPIRATION RATE: 19 BRPM

## 2025-05-29 DIAGNOSIS — I69.319 COGNITIVE DEFICITS FOLLOWING CEREBRAL INFARCTION: ICD-10-CM

## 2025-05-29 DIAGNOSIS — R26.2 DIFFICULTY WALKING: Primary | ICD-10-CM

## 2025-05-29 DIAGNOSIS — Z86.718 HISTORY OF DVT (DEEP VEIN THROMBOSIS): ICD-10-CM

## 2025-05-29 DIAGNOSIS — R53.1 WEAKNESS GENERALIZED: ICD-10-CM

## 2025-05-29 DIAGNOSIS — R60.0 LOCALIZED EDEMA: ICD-10-CM

## 2025-05-29 DIAGNOSIS — Z78.9 SELF-CARE DEFICIT: ICD-10-CM

## 2025-05-29 DIAGNOSIS — Z86.73 H/O: STROKE: Primary | ICD-10-CM

## 2025-05-29 PROCEDURE — 93970 EXTREMITY STUDY: CPT | Performed by: SURGERY

## 2025-05-29 PROCEDURE — 93970 EXTREMITY STUDY: CPT

## 2025-05-29 PROCEDURE — 97530 THERAPEUTIC ACTIVITIES: CPT | Performed by: PHYSICAL THERAPIST

## 2025-05-29 PROCEDURE — 97167 OT EVAL HIGH COMPLEX 60 MIN: CPT

## 2025-05-29 NOTE — PROGRESS NOTES
PT Re-Evaluation          POC expires Unit limit Auth Expiration date PT/OT + Visit Limit? Co-Insurance   25   [place] primary, 30pcy secondary Yes                                            Today's date: 2025  Patient name: Asad Galloway  : 1960  MRN: [id number]  Referring provider: Mallorie Ulloa*  Dx:   Encounter Diagnosis     ICD-10-CM    1. Difficulty walking  R26.2       2. Weakness generalized  R53.1                   Assessment  Assessment details: Patient is a 64 y.o. male who presents to skilled PT for impaired balance, general deconditioning with multiple comorbidiites including hx of CVA, CKD on HD. Patient with multiple hospitalizations over the past few months, with most recently being discharged home last week. He did a few sessions of home PT and OT and now returns for continuation of OP PT and OT. He continues to require mod A for sit>stands, and min A for stand pivots and ambulation with RW. He is deemed high fall risk per 5x STS, TUG, Burger. Pt's wife reports high caregiver burden as she assists him with all mobility and ADL's. No goals met at this time due to recent hospitalizations. Pt is scheduled for TAVR on  so plan to focus on strengthening, endurance, and functional mobility leading up to the procedure. Pt and wife both verbalized understanding. Plan to resume outpatient PT with focus on transfers and ambulation, in order to decrease caregiver burden and maximize pt safety.       Please contact me if you have any questions or recommendations. Thank you for the referral and the opportunity to share in Asad Galloway's care.      Cut off score   All date taken from [facility] Neuro Section or Rehab Measures    BURGER test: 46/56                                              5 x STS Test:  MDC: 6 points                                                  MDC: 2.3 seconds   age norms                                                                  Age Norms   60-69 year old = M: 55, F: 55                        60-69 year old: 11.4 seconds   70-79 year old = M 54,  F: 53                       70-79 year old: 12.6 seconds     80-89 year old = M53,   F: 50                       80-89 year old: 14.8 seconds      TUG test:                                                                     10 Meter Walk Test:  MDC: 4.14 seconds       MDC: .59 ft/sec  Cut off score for Falls                                                  Age Norms  > 13.5 seconds community dwelling adults                20-29; M: 4.56 ft/sec F: 4.62 ft/sec  > 32.2 Frail Elderly                                                     30-39: M 4.76 ft/sec  F: 4.68 ft/sec          40-49: M: 4.79 ft/sec  F: 4.62 ft/sec  6 Minute Walk Test      50-59: M: 4.76 ft/sec  F: 4.56 ft/sec  MDC: 190 feet       60-69: M: 4.56 ft/sec  F: 4.26 ft/sec  Age Norms       70-+    M: 4.36 ft/sec  F: 4.16 ft/sec  60-69:    M: 1876 F: 1765  70-79:    M: 1729 F: 1545    FGA:  80-89 +: M: 1368 F; 1286       MCID: 4 points            Geriatrics/ Community Dwelling Older Adults: </= 22/30 fall risk            Geriatrics/ Community Dwelling Older Adults: </= 20/30 unexplained falls in the next 6 months            Parkinsons: </= 18/30 fall risk      Patient verbalized understanding of POC          Impairments: Abnormal coordination, Abnormal gait, Abnormal muscle tone, Abnormal or restricted ROM, Activity intolerance, Impaired balance, Impaired physical strength, Lacks appropriate HEP, Poor posture, Poor body mechanics, Pain with function, Safety issue, Weight-bearing intolerance, Abnormal movement, Difficulty understanding, Abnormal muscle firing  Understanding of Dx/Px/POC: Excellent  Prognosis: Excellent    Patient verbalized understanding of POC.         Please contact me if you have any questions or recommendations. Thank you for the referral and the opportunity to share in Asad JAMES  Laverne's care.        Plan  Plan details: complete testing, balance and functional strength/endurance re-training  Patient would benefit from: PT Eval and Skilled PT  Planned modality interventions: Biofeedback, Cryotherapy, TENS, Thermotherapy  Planned therapy interventions: Abdominal trunk stabilization, ADL training, Balance, Balance/WB training, Breathing training, Body mechanics training, Coordination, Functional ROM exercises, Gait training, HEP, Joint Mobilization, Manual Therapy, Griggs taping, Motor coordination training, Neuromuscular re-education, Patient education, Postural training, Strengthening, Stretching, Therapeutic activities, Therapeutic exercises, Therapeutic training, Transfer training, Activity modification, Work reintegration  Frequency: 2-3x/week  Duration in weeks: 12  Plan of Care beginning date: 4/1/2025  Plan of Care expiration date: 12 weeks - 6/24/2025  Treatment plan discussed with: Patient       Goals  Short Term Goals (4 weeks):  NOT MET  - Patient will improve time on TUG by 2.9 seconds to facilitate improved safety in all ambulation  - Patient will be independent in basic HEP 2-3 weeks  - Patient will improve 5xSTS score by 2.3 seconds to promote improved LE functional strength needed for ADLs  - Patient will perform 6MWT to assess cardiovascular endurance and improve ability to ambulate for prolonged periods of time.  - Patient will perform sit to stand with min A.      Long Term Goals (12 weeks):  - Patient will be independent in a comprehensive home exercise program  - Patient will improve gait speed by 0.18 m/s to improve safety with community ambulation  - Patient will improve BURGER by 6 points in order to improve static balance and reduce risk for falls  - Patient will improve 6 Minute Walk Test score by 190 feet to promote improved cardiovascular endurance  - Patient will report 50% reduction in near falls in order to improve safety with functional tasks and reduce his  risk for falls  - Patient will report going on walks at least 3 days per week to promote independence and improved cardiovascular endurance  - Patient will be able to ascend/descend stairs reciprocally with 1 UE assist to promote independence and safety with ADL  - Patient will report 50% reduction in near falls when ambulating on uneven terrain  - Patient will be able to perform sit to stand with CS.  - Patient will be able to ambulate 150 ft with SPC and CG.       Cut off score    All date taken from [facility] Neuro Section or Rehab Measures      Cabrera/56  MDC: 6 pts  Age Norms:  60-69: M - 55   F - 55  70-79: M - 54   F - 53  80-89: M - 53   F - 50 5xSTS: June et al 2010  MDC: 2.3 sec  Age Norms:  60-69: 11.4 sec  70-79: 12.6 sec  80-89: 14.8 sec   TUG  MDC: 4.14 sec  Cut off score:  >13.5 sec community dwelling adults  >32.2 frail elderly  <20 I for basic transfers  >30 dependent on transfers 10 Meter Walk Test: Shmuel and Christiano et al 2011  MDC: 0.18 m/s  20-29: M - 1.35 m   F - 1.34 m  30-39: M - 1.43 m   F - 1.34 m  40-49: M - 1.43 m   F - 1.39 m  50-59: M - 1.43 m   F - 1.31 m  60-69: M - 1.34 m   F - 1.24 m  70-79: M - 1.26 m   F - 1.13 m  80-89: M - 0.97 m   F - 0.94 m    Household Ambulator < 0.4 m/s  Limited Community Ambulator 0.4 - 0.8 m/s  Community Ambulator 0.8 - 1.2 m/s  Safely cross the street > 1.2 m/s   FGA  MCID: 4 pts  Geriatrics/community < 22/30 fall risk  Geriatrics/community < 20/30 unexplained falls    DGI  MDC: vestibular - 4 pts  MDC: geriatric/community - 3 pts  Falls risk <19/24 mCTSIB  Norm: 20-60 yrs  Eyes open firm: norm sway 0.21-0.48  Eyes closed firm: norm sway 0.48-0.99  Eyes open foam: norm sway 0.38-0.71  Eyes closed foam: norm sway 0.70-2.22   6 Minute Walk Test  MDC: 190.98 ft  MCID: 164 ft    Age Norms  60-69: M - 1876 ft (571.80 m)  F - 1765 ft (537.98 m)  70-79: M - 1729 ft (527.00 m)  F - 1545 ft (470.92 m)  80-89: M - 1368 ft (416.97 m)  F - 1286 ft (391.97 m) ABC:  Smith & Tre, 2003  <67% increased risk for falls   [place] Monofilaments  Evaluator Size:        Force (grams):          Hand/Dorsal Thresholds:        Plantar Thresholds:  - 1.65                       - 0.008                       - Normal                                 - Normal  - 2.36                       - 0.02                         - Normal                                 - Normal  - 2.44                       - 0.04                         - Normal                                 - Normal  - 2.83                       - 0.07                         - Normal                                 - Normal  - 3.22                       - 0.16                         - Diminished light touch          - Normal  - 3.61                       - 0.40                         - Diminished light touch          - Normal  - 3.84                       - 0.60                         - Diminished protective           - Diminished light touch  - 4.08                       - 1.00                         - Diminished protective           - Diminished light touch  - 4.17                       - 1.40                         - Diminished protective           - Diminished light touch  - 4.31                       - 2.00                         - Diminished protective           - Diminished light touch  - 4.56                       - 4.00                         - Loss of protective sense      - Diminished protective  - 4.74                       - 6.00                         - Loss of protective sense      - Diminished protective  - 4.93                       - 8.00                         - Loss of protective sense      - Diminished protective  - 5.07                       - 10.0                         - Loss of protective sense     - Loss of protective sense  - 5.18                       - 15.0                         - Loss of protective sense     - Loss of protective sense  - 5.46                       - 26.0                          - Loss of protective sense     - Loss of protective sense  - 5.88                       - 60.0                         - Loss of protective sense     - Loss of protective sense  - 6.10                       - 100                          - Loss of protective sense     - Loss of protective sense  - 6.45                       - 180                          - Loss of protective sense     - Loss of protective sense  - 6.65                       - 300                          - Deep pressure sense only  - Deep pressure sense only         Subjective    History of Present Illness  - Mechanism of injury:  Updated 1/30/25: Pt was discharged from outpatient PT on 1/7 with plans to initiate home PT. He went to the hospital on 1/11 diagnosed with RLE DVT and discharged home with home PT. Pt's wife reports home PT just had him walking, not really doing many other exercises or balance interventions. They return now for outpatient PT to receive more intensive therapy. Pt's wife reports he is falling every day and she cannot keep picking him up. They want his walking to get better. He always has supervision/assist at home, if his wife is at work his adult children stay with him.     Updated 4/1/25: Pt was hospitalized at [facility] for 17 days and had 3 cardiac caths, then was at [facility] for 2 weeks. Was discharged to SNF where he spent 8 days but hardly had any therapy. D/C home and now presents to PT. Pt's wife reports his mobility is worse than before. He is scheduled for valve replacement at [facility] in May. He had 1 fall since getting home, but family is with him 24/7.     Updated 5/29/25: Pt was last seen on 4/8/25 and then hospitalized again for prolonged time (2 separate hospitalizations- 1st time due to a fall out of the car and 2nd time due to fluid overload from dialysis). He was D/C home and saw home PT and OT for a few visits, and now feels ready to return to OP PT and OT. He is scheduled for TAVR on  " and son reports they want him to be stronger for the procedure. Pt's wife stays home with him, and when she is at work (part-time) her son stays with him. Pt's wife reports she does all ADL's with him and transfers him; high caregiver burden.    - Primary AD: RW  - Assist level at home: family assists him with ADLs  - Decreased fine motor tasks: No    Patient goal:  \"I want to go home and get better and never come back here again\"    Pain  - Current pain ratin/10  - At best pain ratin/10  - At worst pain ratin/10  - Location: low back  - Aggravating factors: unsure     Social Support  - Steps to enter house: 3  - Stairs in house: yes but bedroom on 1st floor    - Lives in: multi story house   - Lives with: wife and 2 kids     - Employment status: retired   - Hand dominance: right    Treatments  - Previous treatment: outpatient neuro PT at this site  - Current treatment: re-initiating PT eval/ tx        Objective     LE MMT  - R Hip Flexion: 3+/5  L Hip Flexion: 3+/5  - R Hip Abduction: 3+/5  L Hip Abduction: 3+/5  - R Hip Adduction: 3+/5  L Hip Adduction: 3+/5  - R Knee Extension: 3+/5  L Knee Extension: 3+/5  - R Ankle DF: 4/5   L Ankle DF: 4/5  - R Ankle PF: 4/5   L Ankle PF: 4/5    Sensation  - Light touch: intact     Coordination  - Heel to Shin: impaired B/L  - Alternate Toe Taps: impaired B/L      Gait  - Abnormalities: ambulates without AD on eval, demos narrow JOSE DAVID, inconsistent step length, lateral instability     Functional mobility:  - Sit to stand: min-mod A  - Transfers: min A with RW        Outcome Measures Initial Eval  24 Re-eval  10/10/24 Re eval   24 Re-eval  25 Re-eval  25 Re-eval  25   5xSTS 15.85 sec, 2 UE  15.41 sec, 2 UE  21.06 sec, pushing off thighs  46.97 sec pushing off thighs + mod A Only performed 3 reps, 25.63 sec 43.48 sec pushing off armrests + mod A   TUG  - Regular  - Cognitive   17.3 sec, no AD  24.3 sec, (serial 3s)   12.59 sec, no " AD  18.56 sec, no AD (serial 3s)   16. 44 sec, pushing off thighs  19.74 sec, serial 3s   22.11 sec with RW    27.53 sec with RW, serial 3s   24.52 sec with RW   43.59 sec with RW   10 meter   1.18 m/s without AD   0.86 m/s with cane   0.71 m/s wo cane         BURGER 30/56 33/56 34/56 14/56 7/56 8/56   mCTSIB  - FTEO (firm)  - FTEC (firm)  - FTEO (foam)  - FTEC (foam)   30 sec +  30 ++  2 sec  0 sec   30 sec+  30 sec+  3 sec  0 sec   30 sec+  30 sec+  3 sec   0 sec      6MWT 100 ft (attempted but only able to complete 1 minute) 275 ft in 4 minutes  212 ft in 2 minutes w no cane   2MWT: 84 ft with RW and CG 2MWT: 104 ft with RW and min A   ABC 47.5% 81.88% 72.5%                    Interventions performed 5/29/25:  - sit to stands with 1 airex pad on chair: 5 reps with min A and vc's for hand placement  - Nustep L1 x 6 min, BUE/BLE for general conditioning      Precautions: falls   Past Medical History:   Diagnosis Date    Cerebrovascular accident (CVA) due to thrombosis of left middle cerebral artery (HCC) 07/29/2018    Chronic kidney disease     Coronary artery disease     Diabetes mellitus (HCC)     not on meds    Dialysis patient ([facility])     M-W-F    Fistula     left upper arm for hemodialyis    GERD (gastroesophageal reflux disease)     History of coronary artery stent placement     x3    Hypercholesteremia     Hyperlipidemia     Hypertension     Infectious viral hepatitis     B as child    Limb alert care status     no BP/IV left arm    Neuropathy     Nonhealing ulcer of heel (HCC) 04/25/2023    Obesity     Osteomyelitis (HCC)     last assessed 11/4/16-per son not currently    Penetrating foot wound, left, initial encounter 01/19/2022    PVC's (premature ventricular contractions)     sees  Cardio    Stroke ([facility])     last weeof July 2018 [facility]    TIA (transient ischemic attack) 10/28/2018    Ulcer of left heel, limited to breakdown of skin (HCC) 06/13/2024    Wears dentures     Wears glasses

## 2025-05-31 ENCOUNTER — HOSPITAL ENCOUNTER (EMERGENCY)
Facility: HOSPITAL | Age: 65
Discharge: HOME | End: 2025-05-31
Attending: EMERGENCY MEDICINE | Admitting: EMERGENCY MEDICINE
Payer: MEDICARE

## 2025-05-31 VITALS
DIASTOLIC BLOOD PRESSURE: 50 MMHG | TEMPERATURE: 98.2 F | SYSTOLIC BLOOD PRESSURE: 104 MMHG | HEART RATE: 87 BPM | OXYGEN SATURATION: 92 % | RESPIRATION RATE: 18 BRPM

## 2025-05-31 DIAGNOSIS — S01.81XA FACIAL LACERATION, INITIAL ENCOUNTER: Primary | ICD-10-CM

## 2025-05-31 PROCEDURE — 99283 EMERGENCY DEPT VISIT LOW MDM: CPT | Performed by: EMERGENCY MEDICINE

## 2025-05-31 PROCEDURE — G0168 WOUND CLOSURE BY ADHESIVE: HCPCS | Performed by: EMERGENCY MEDICINE

## 2025-05-31 PROCEDURE — 99282 EMERGENCY DEPT VISIT SF MDM: CPT

## 2025-06-01 NOTE — DISCHARGE INSTRUCTIONS
Allow the bandage supplies to your his nose to fall off by itself.  Do not attempt to remove the bandage.  Return to the emergency department with any further bleeding.

## 2025-06-01 NOTE — ED PROVIDER NOTES
Time reflects when diagnosis was documented in both MDM as applicable and the Disposition within this note       Time User Action Codes Description Comment    5/31/2025  8:13 PM Tapan Islas Add [S01.81XA] Facial laceration, initial encounter           ED Disposition       ED Disposition   Discharge    Condition   Stable    Date/Time   Sat May 31, 2025  8:13 PM    Comment   Asad Galloway discharge to home/self care.                   Assessment & Plan       Medical Decision Making  65-year-old male presenting with small punctate bleed under the right nare.  Attempted greater than 10 minutes local pressure but continue to rebleed.  Small area of Surgifoam placed and adhered into place with Dermabond.  Patient was monitored in the emergency department greater than 20 minutes and remained hemostatic.  Requested that he leave the bandage in place and it will fall off by itself and not to be removed forcefully.  Wife also verbalized understanding of plan and agrees. Reviewed all findings both relevant and incidental with the patient at bedside. Pt verbalized understanding of findings, neccesary follow up, return to ED precautions. Pt agreed to review today's findings with their primary care provider. Pt non-toxic appearing upon discharge.       Amount and/or Complexity of Data Reviewed  Independent Historian: spouse  External Data Reviewed: notes.             Medications - No data to display    ED Risk Strat Scores                    No data recorded                            History of Present Illness       Chief Complaint   Patient presents with    Facial Laceration     Pt wife was shaving pt and he moved and she accidentally cut him. Bleeding for about 2 hrs. +thinners       Past Medical History[1]   Past Surgical History[2]   Family History[3]   Social History[4]   E-Cigarette/Vaping    E-Cigarette Use Never User       E-Cigarette/Vaping Substances    Nicotine No     THC No     CBD No     Flavoring No     Other No      Unknown No       I have reviewed and agree with the history as documented.     65-year-old male presenting to the emergency department with less than 1 mm cut under the right nare that was incurred this morning while shaving and he and wife were unable to achieve hemostasis in the home.          Review of Systems   Skin:  Positive for wound.   All other systems reviewed and are negative.          Objective       ED Triage Vitals   Temperature Pulse Blood Pressure Respirations SpO2 Patient Position - Orthostatic VS   05/31/25 1945 05/31/25 1858 05/31/25 1945 05/31/25 1858 05/31/25 1858 --   98.2 °F (36.8 °C) 88 104/50 18 92 %       Temp Source Heart Rate Source BP Location FiO2 (%) Pain Score    05/31/25 1945 05/31/25 1858 -- -- --    Oral Monitor         Vitals      Date and Time Temp Pulse SpO2 Resp BP Pain Score FACES Pain Rating User   05/31/25 1945 98.2 °F (36.8 °C) -- -- -- 104/50 -- -- JR   05/31/25 1900 -- 87 -- 18 -- -- -- FRANKIE   05/31/25 1858 -- 88 92 % 18 -- -- -- FRANKIE            Physical Exam  Constitutional:       General: He is not in acute distress.     Appearance: He is not ill-appearing.   HENT:      Head: Normocephalic and atraumatic.      Nose: Nose normal.     Cardiovascular:      Rate and Rhythm: Normal rate.   Pulmonary:      Effort: Pulmonary effort is normal.   Abdominal:      General: Abdomen is flat.     Skin:     General: Skin is warm and dry.      Comments: Less than 1 mm lesion nearly punctate in manner under the right nare.  No actual apparent opening in the dermis     Neurological:      General: No focal deficit present.      Mental Status: He is alert.         Results Reviewed       None            No orders to display       Procedures    ED Medication and Procedure Management   Prior to Admission Medications   Prescriptions Last Dose Informant Patient Reported? Taking?   Vitamin D3 1.25 MG (74260 UT) capsule  Child No No   Sig: TAKE 1 CAPSULE BY MOUTH ONE TIME PER WEEK   apixaban  (Eliquis) 2.5 mg  Child No No   Sig: Take 1 tablet (2.5 mg total) by mouth 2 (two) times a day   aspirin 81 mg chewable tablet  Child Yes No   Sig: Chew 1 tablet in the morning.   atorvastatin (LIPITOR) 40 mg tablet  Child No No   Sig: Take 1 tablet (40 mg total) by mouth daily with dinner   midodrine (PROAMATINE) 10 MG tablet  Child No No   Sig: Take 1 tablet (10 mg total) by mouth Before Dialysis (before dialysis)   Patient not taking: Reported on 5/24/2025   midodrine (PROAMATINE) 5 mg tablet  Child No No   Sig: Take 3 tablets (15 mg total) by mouth 3 (three) times a day before meals   prasugrel (EFFIENT) tablet  Child No No   Sig: Take 1 tablet (10 mg total) by mouth daily      Facility-Administered Medications: None     Patient's Medications   Discharge Prescriptions    No medications on file     No discharge procedures on file.  ED SEPSIS DOCUMENTATION   Time reflects when diagnosis was documented in both MDM as applicable and the Disposition within this note       Time User Action Codes Description Comment    5/31/2025  8:13 PM Tapan Islas Add [S01.81XA] Facial laceration, initial encounter                    [1]   Past Medical History:  Diagnosis Date    Cerebrovascular accident (CVA) due to thrombosis of left middle cerebral artery (HCC) 07/29/2018    Chronic kidney disease     Coronary artery disease     Diabetes mellitus (HCC)     not on meds    Dialysis patient (HCC)     M-W-F    Fistula     left upper arm for hemodialyis    GERD (gastroesophageal reflux disease)     History of coronary artery stent placement     x3    Hypercholesteremia     Hyperlipidemia     Hypertension     Infectious viral hepatitis     B as child    Limb alert care status     no BP/IV left arm    Neuropathy     Nonhealing ulcer of heel (HCC) 04/25/2023    Obesity     Osteomyelitis (HCC)     last assessed 11/4/16-per son not currently    Penetrating foot wound, left, initial encounter 01/19/2022    PVC's (premature ventricular  "contractions)     sees  Cardio    Stroke (HCC)     last weeof July 2018 [facility]    TIA (transient ischemic attack) 10/28/2018    Ulcer of left heel, limited to breakdown of skin (HCC) 06/13/2024    Wears dentures     Wears glasses    [2]   Past Surgical History:  Procedure Laterality Date    ABDOMINAL SURGERY      CARDIAC CATHETERIZATION N/A 05/02/2022    Procedure: Cardiac Coronary Angiogram;  Surgeon: Sam Davis MD;  [facility];  Service: Cardiology    CARDIAC CATHETERIZATION N/A 05/02/2022    Procedure: Cardiac pci;  Surgeon: Sam Davis MD;  [facility];  Service: Cardiology    CARDIAC CATHETERIZATION  02/01/2023    Procedure: Cardiac catheterization;  Surgeon: Sam Davis MD;  [facility];  Service: Cardiology    CARDIAC CATHETERIZATION N/A 02/01/2023    Procedure: Cardiac pci;  Surgeon: Sam Davis MD;  [facility];  Service: Cardiology    CARDIAC CATHETERIZATION N/A 02/01/2023    Procedure: Cardiac Coronary Angiogram;  Surgeon: Sam Davis MD;  [facility];  Service: Cardiology    CARDIAC CATHETERIZATION N/A 02/01/2023    Procedure: Cardiac other-IVUS;  Surgeon: Sam Davis MD;  [facility];  Service: Cardiology    CHOLECYSTECTOMY      Percutaneous    COLONOSCOPY      CORONARY ANGIOPLASTY WITH STENT PLACEMENT      CYSTOSCOPY      HEMODIALYSIS ADULT  1/22/2024    HEMODIALYSIS ADULT  01/24/2024    HEMODIALYSIS ADULT  5/19/2025    IR LOWER EXTREMITY ANGIOGRAM  9/29/2023    IR LOWER EXTREMITY ANGIOGRAM  02/26/2024    OTHER SURGICAL HISTORY      \"stimulator to control bowel movements\"    AZ ESOPHAGOGASTRODUODENOSCOPY TRANSORAL DIAGNOSTIC N/A 09/27/2016    Procedure: ESOPHAGOGASTRODUODENOSCOPY (EGD);  Surgeon: Adele Rowe MD;  Location: [facility];  Service: Gastroenterology    AZ LAPAROSCOPY SURG CHOLECYSTECTOMY N/A 02/29/2016    Procedure: LAPAROSCOPIC CHOLECYSTECTOMY ;  " "Surgeon: Cliff Roman DO;  Location: [facility];  Service: General    SD SLCTV CATHJ 3RD+ ORD SLCTV ABDL PEL/LXTR BRNCH Left 9/29/2023    Procedure: Left leg angiogram with intervention;  Surgeon: Michelle Galvan MD;  Location[facility];  Service: Vascular    SD SLCTV CATHJ 3RD+ ORD SLCTV ABDL PEL/LXTR BRNCH Left 02/26/2024    Procedure: Left leg angiogram antegrade access, left popliteal angioplasty;  Surgeon: Michelle Galvan MD;  Location: [facility];  Service: Vascular    ROTATOR CUFF REPAIR Right     SPINAL CORD STIMULATOR IMPLANT      \"Medtronic interstim model # 3058- in lower back to control bowel movements-currently turned off-battery is dead\"    TOE AMPUTATION Right 10/28/2016    Procedure: 3RD TOE AMPUTATION ;  Surgeon: Anjel Salas DPM;  Location[facility];  Service:    [3]   Family History  Problem Relation Name Age of Onset    Leukemia Mother      Liver disease Mother      Lung cancer Mother          heavy smoker - 3 ppd    Heart disease Father Son     Liver disease Father Son     Diabetes type I Father Son     Multiple myeloma Sister      Breast cancer Sister      Urolithiasis Family      Alcohol abuse Neg Hx      Depression Neg Hx      Drug abuse Neg Hx      Substance Abuse Neg Hx      Mental illness Neg Hx     [4]   Social History  Tobacco Use    Smoking status: Never    Smokeless tobacco: Never   Vaping Use    Vaping status: Never Used   Substance Use Topics    Alcohol use: Never    Drug use: No        Tapan Islas DO  05/31/25 2018    "

## 2025-06-02 ENCOUNTER — RESULTS FOLLOW-UP (OUTPATIENT)
Dept: HEMATOLOGY ONCOLOGY | Facility: MEDICAL CENTER | Age: 65
End: 2025-06-02

## 2025-06-02 ENCOUNTER — VBI (OUTPATIENT)
Dept: FAMILY MEDICINE CLINIC | Facility: CLINIC | Age: 65
End: 2025-06-02

## 2025-06-02 NOTE — TELEPHONE ENCOUNTER
Discussed results with Dr. Bourne.  Also discussed with patient's son by telephone.  Patient's son Sam is aware that the Doppler from 5/29/2025 shows resolution of the previous left IJV thrombus.  Continue anticoagulation as currently taking.

## 2025-06-02 NOTE — TELEPHONE ENCOUNTER
06/02/25 9:59 AM    Patient contacted post ED visit, [facility] department spoke with patient/caregiver and outreach was successful.    Thank you.  Jerrica Wallace MA  [facility]

## 2025-06-02 NOTE — PROGRESS NOTES
[facility]        NAME: Kellie Herrera is a 58 y o  male  : 1960    MRN: [id number]  DATE: 2022  TIME: 4:15 PM    Assessment and Plan   Superficial laceration of face [S01 81XA]  1  Superficial laceration of face       --Small area of persistent bleeding cauterized with silver nitrate stick, followed by application of Surgicel + 2x2 pressure dressing with good effect  No active bleeding at time of discharge  Patient Instructions     --Keep Surgicel, pressure dressing in place for at least 24-48 hours, then may carefully remove  --Go to ER for any ongoing/recurrent bleeding  Chief Complaint     Chief Complaint   Patient presents with    Laceration     right side of face above upper lip/under nose, was shaving and cut face x today         History of Present Illness       Here with wife for complaints of persistent bleeding to right upper lip from shaving cut about 10 minutes ago  Bleeding not stopping despite direct pressure  No prior issues with this  He is on baby aspirin, however  Review of Systems   Review of Systems   Skin: Positive for wound           Current Medications       Current Outpatient Medications:     aluminum-magnesium hydroxide-simethicone (MYLANTA) 200-200-20 mg/5 mL suspension, Take 30 mL by mouth every 4 (four) hours as needed for indigestion or heartburn, Disp: 355 mL, Rfl: 0    aspirin (ECOTRIN LOW STRENGTH) 81 mg EC tablet, Take 81 mg by mouth daily Resume on   , Disp: , Rfl:     atorvastatin (LIPITOR) 40 mg tablet, Take 1 tablet (40 mg total) by mouth daily with dinner, Disp: 90 tablet, Rfl: 1    calcium acetate (CALPHRON) 667 mg, TAKE 2 TABLETS BY MOUTH THREE TIMES DAILY WITH MEALS, Disp: , Rfl:     carvedilol (COREG) 6 25 mg tablet, Take 1 tablet (6 25 mg total) by mouth 2 (two) times a day with meals Or as directed by your kidney doctor, Disp: 180 tablet, Rfl: 1    Cholecalciferol (Vitamin D3) 1 25 MG (60094 UT) CAPS, TAKE 1 CAPSULE Writer chowdhury Pt to relay message per Provider as below:  Bijan Guillaume MD   Topical therapy like rogaine is available over the counter (2% solution is Womens Rogaine) (5% foam is Mens Rogaine)  I prefer Mens Rogaine once a week to start and then can increase to twice a week if tolerating    Pt verbalized that her concern is not with hair loss. She has unwanted hair on her chin, chest and below umbilicus.  Pt is looking for something to get rid of her unwanted hair. Pt had thought that there was an ointment or cream that had been mentioned.    Message to Provider.   BY MOUTH ONE TIME PER WEEK, Disp: 12 capsule, Rfl: 2    insulin lispro (HumaLOG KwikPen) 100 units/mL injection pen, INJECT 4 UNITS 3 TIMES A DAY WITH MEALS PLUS SCALE (max daily dose 42 units), Disp: 45 mL, Rfl: 1    Lantus 100 UNIT/ML subcutaneous injection, INJECT 14 UNITS UNDER THE SKIN DAILY, Disp: 10 mL, Rfl: 0    Nutritional Supplements (VITAMIN D BOOSTER PO), Take 0 5 mcg by mouth  , Disp: , Rfl:     TORSEMIDE PO, Take 50 mg by mouth Pt takes 50 mg daily on non- dialysis days: sat, sun, Tues, and thrusday  Pt takes 10 mg of torsemide daily on dialysis days m- w- f  , Disp: , Rfl:     BD Pen Needle Adina U/F 32G X 4 MM MISC, USE TO INJECT INSULIN 4 TIMES DAILY, Disp: 400 each, Rfl: 1    Blood Glucose Monitoring Suppl (True Metrix Meter) w/Device KIT, Use to test blood sugars 3 times daily, Disp: 1 kit, Rfl: 0    Blood Pressure Monitoring (BLOOD PRESSURE CUFF) MISC, Use to check blood pressure before taking blood pressure medication and 1 hour after and follow instructions provided in discharge instructions based on the readings  , Disp: 1 each, Rfl: 0    calcium carbonate (TUMS) 500 mg chewable tablet, Chew 1 tablet (500 mg total) daily as needed for indigestion or heartburn, Disp: , Rfl: 0    Continuous Blood Gluc  (DEXCOM G6 ) LANCE, Use as directed for continuous glucose monitoring, Disp: 1 Device, Rfl: 0    Continuous Blood Gluc Sensor (DEXCOM G6 SENSOR) MISC, Use as directed for continuous glucose monitoring   Change every 10 days, Disp: 1 each, Rfl: 11    Continuous Blood Gluc Transmit (DEXCOM G6 TRANSMITTER) MISC, Use as directed for continuous glucose monitoring-Change every 3 months, Disp: 1 each, Rfl: 3    doxercalciferol (HECTOROL) 0 5 mcg capsule, 4 mcg, Disp: , Rfl:     glucose blood (True Metrix Blood Glucose Test) test strip, Use 1 each 3 (three) times a day Use as instructed, Disp: 100 each, Rfl: 0    Incontinence Supplies (MALE URINAL) MISC, by Does not apply route daily, Disp: 6 each, Rfl: 3    Insulin Syringe-Needle U-100 (B-D INS SYRINGE 0 5CC/30GX1/2") 30G X 1/2" 0 5 ML MISC, Inject under the skin 4 (four) times a day, Disp: 360 each, Rfl: 1    Lancets Ultra Thin 30G MISC, Use 3 times a day, Disp: 300 each, Rfl: 0    meclizine (ANTIVERT) 25 mg tablet, Take 1 tablet (25 mg total) by mouth every 8 (eight) hours as needed for dizziness or nausea, Disp: 30 tablet, Rfl: 0    ONETOUCH DELICA LANCETS 07C MISC, by Does not apply route 3 (three) times a day, Disp: 270 each, Rfl: 1  No current facility-administered medications for this visit  Current Allergies     Allergies as of 04/21/2022    (No Known Allergies)            The following portions of the patient's history were reviewed and updated as appropriate: allergies, current medications, past family history, past medical history, past social history, past surgical history and problem list      Past Medical History:   Diagnosis Date    Cerebrovascular accident (CVA) due to thrombosis of left middle cerebral artery ([facility] ) 7/29/2018    Chronic kidney disease     Diabetes mellitus ([facility] )     GERD (gastroesophageal reflux disease)     Hypercholesteremia     Hyperlipidemia     Hypertension     Infectious viral hepatitis     B as child    Neuropathy     Obesity     Osteomyelitis ([facility] )     last assessed 11/4/16    PVC's (premature ventricular contractions)     sees cardiology Dr iWllis Paz [facility]    Stroke [facility])     last weeof July 2018 [address]    TIA (transient ischemic attack) 10/28/2018       Past Surgical History:   Procedure Laterality Date    ABDOMINAL SURGERY      CHOLECYSTECTOMY      Percutaneous    COLONOSCOPY      CYSTOSCOPY      OTHER SURGICAL HISTORY      "stimulator to control bowel movements"    [place] ESOPHAGOGASTRODUODENOSCOPY TRANSORAL DIAGNOSTIC N/A 9/27/2016    Procedure: ESOPHAGOGASTRODUODENOSCOPY (EGD); Surgeon: Collins Smith MD;  [facility];   Service: Gastroenterology    [place] LAP,CHOLECYSTECTOMY N/A 2/29/2016    Procedure: LAPAROSCOPIC CHOLECYSTECTOMY ;  Surgeon: Fabricio Euceda DO;  [facility];  Service: General    ROTATOR CUFF REPAIR Right     TOE AMPUTATION Right 10/28/2016    Procedure: 3RD TOE AMPUTATION ;  Surgeon: Reno Bonilla DPM;  [facility];  Service:        Family History   Problem Relation Age of Onset    Leukemia Mother     Liver disease Mother     Lung cancer Mother         heavy smoker - 3 ppd    Heart disease Father     Liver disease Father     Multiple myeloma Sister     Breast cancer Sister     Urolithiasis Family     Alcohol abuse Neg Hx     Depression Neg Hx     Drug abuse Neg Hx     Substance Abuse Neg Hx     Mental illness Neg Hx          Medications have been verified  Objective   /78   Pulse 76   Temp 98 2 °F (36 8 °C)   Resp 16   Ht 5' 9" (1 753 m)   Wt 108 kg (239 lb)   SpO2 99%   BMI 35 29 kg/m²   No LMP for male patient  Physical Exam     Physical Exam  Skin:     Comments: Skin just below right nostril with small (5 mm) very superficial, transverse dermal abrasion/laceration with some active bleeding (consistent with typical cut from shaving)  Neurological:      Mental Status: He is alert  Laceration repair    Date/Time: 4/21/2022 4:19 PM  Performed by: BRITTANY Cardenas  Authorized by: BRITTANY Cardenas   Consent: Verbal consent obtained    Risks and benefits: risks, benefits and alternatives were discussed  Consent given by: patient  Patient understanding: patient states understanding of the procedure being performed  Patient consent: the patient's understanding of the procedure matches consent given  Procedure consent: procedure consent matches procedure scheduled  Patient identity confirmed: verbally with patient  Body area: head/neck  Location details: nose  Laceration length: 0 5 cm  Foreign bodies: no foreign bodies  Tendon involvement: none  Vascular damage: no    Sedation:  Patient sedated: no      Wound Dehiscence:  Superficial Wound Dehiscence: simple closure      Procedure Details:  Wound skin closure material used: Surgicel    Approximation: close  Approximation difficulty: simple  Dressing: pressure dressing  Patient tolerance: patient tolerated the procedure well with no immediate complications

## 2025-06-03 ENCOUNTER — OFFICE VISIT (OUTPATIENT)
Facility: CLINIC | Age: 65
End: 2025-06-03
Attending: PHYSICIAN ASSISTANT
Payer: MEDICARE

## 2025-06-03 ENCOUNTER — OFFICE VISIT (OUTPATIENT)
Facility: CLINIC | Age: 65
End: 2025-06-03
Payer: MEDICARE

## 2025-06-03 DIAGNOSIS — R26.2 DIFFICULTY WALKING: Primary | ICD-10-CM

## 2025-06-03 DIAGNOSIS — R53.1 WEAKNESS GENERALIZED: ICD-10-CM

## 2025-06-03 DIAGNOSIS — Z86.73 H/O: STROKE: Primary | ICD-10-CM

## 2025-06-03 DIAGNOSIS — Z78.9 SELF-CARE DEFICIT: ICD-10-CM

## 2025-06-03 DIAGNOSIS — I69.319 COGNITIVE DEFICITS FOLLOWING CEREBRAL INFARCTION: ICD-10-CM

## 2025-06-03 PROCEDURE — 97112 NEUROMUSCULAR REEDUCATION: CPT

## 2025-06-03 PROCEDURE — 97530 THERAPEUTIC ACTIVITIES: CPT

## 2025-06-03 PROCEDURE — 97112 NEUROMUSCULAR REEDUCATION: CPT | Performed by: PHYSICAL THERAPIST

## 2025-06-03 NOTE — PROGRESS NOTES
Daily Note     Today's date: 6/3/2025  Patient name: Asad Galloway  : 1960  MRN: [id number]  Referring provider: Mallorie Ulloa*  Dx:   Encounter Diagnoses   Name Primary?    H/O: stroke Yes    Self-care deficit     Cognitive deficits following cerebral infarction                   PLAN OF CARE START: 25  PLAN OF CARE END: 25  FREQUENCY: 2x/week  PRECAUTIONS dialysis 3x/week; NO FOOD OR WATER CAN BE GIVEN TO PATIENT; scared of dogs; do not take BP in L UE    Subjective: Pt reports being tired today.     Objective: See treatment below.    TA/NMR:   Completed STS with sets of 2-5 reps to work on functional transitions and activity tolerance.   Completed yellow flex bar bends for bilateral UE strength,  strength.   Engaged in ball toss game with therapist, pt keeping both arms up overhead until fatigue. Working on UE endurance, bilateral coordination, functional activity tolerance. Completed two rounds to fatigue. Then advanced the activity with pt holding a 1 lb bar bell and hitting the ball back to the therapist, with the patient completing another two rounds to fatigue.   Pt in standing at counter top engaged game of Clever Sense with the therapist, pt working on bilateral FMC, attention, EF skills, functional activity tolerance. Pt maintained standing for ~8 minutes before requiring a rest period.       Assessment: Tolerated treatment fair. Pt demonstrating in session impaired activity tolerance, and deficits to UE strength, and functional cognition. Pt would benefit from continued OT services to address strength, endurance, and activity tolerance for ADLs and IADLs.       Plan: Continued skilled OT per POC.

## 2025-06-03 NOTE — PROGRESS NOTES
Daily Note     Today's date: 6/3/2025  Patient name: Asad Galloway  : 1960  MRN: [id number]  Referring provider: Mallorie Ulloa*  Dx:   Encounter Diagnosis     ICD-10-CM    1. Difficulty walking  R26.2       2. Weakness generalized  R53.1                   DO NOT OFFER WATER DUE TO FLUID RESTRICTION (dialysis)       Subjective: Patient reports to PT in a w/c with his wife and complains of fatigue.      Objective: See treatment diary below    NMR:  - Nustep L1 x 5 min (no rest breaks, able to perform consecutively)   - Ambulation with RW around clinic 90 ft, CG  - STS from w/c: 2 reps, Roni      Active Rest Break:  - Seated marching  - LAQ 10x each leg        Assessment: Pt tolerated treatment well with focus on functional mobility. Patient required significant PT motivation for participation in today's session due to significant reported fatigue. Patient refused to perform ambulation and STS as activities due to reported HIGH fatigue mid session, however reported no difficulty with ambulation out of clinic at end of session. Utilized active rest break between NuStep and transfers. Continued to recommend use of RW for all mobility at home. Patient would benefit from continued PT to focus on gait and transfers, with progression to more balance training.      Plan: Continue per plan of care.  Pt's wife inquiring about rollator; practice with pt next visit to assess for safety.    POC expires Unit limit Auth Expiration date PT/OT + Visit Limit? Co-Insurance      [employer] primary, 30pcy secondary Yes                                            Outcome Measures Initial Eval  24 Re-eval  10/10/24 Re eval   24 Re-eval  25 Re-eval  25 Re-eval  25   5xSTS 15.85 sec, 2 UE  15.41 sec, 2 UE  21.06 sec, pushing off thighs  46.97 sec pushing off thighs + mod A Only performed 3 reps, 25.63 sec 43.48 sec pushing off armrests + mod A   TUG  - Regular  - Cognitive   17.3 sec, no AD  24.3 sec, (serial  3s)   12.59 sec, no AD  18.56 sec, no AD (serial 3s)   16. 44 sec, pushing off thighs  19.74 sec, serial 3s   22.11 sec with RW    27.53 sec with RW, serial 3s   24.52 sec with RW   43.59 sec with RW   10 meter   1.18 m/s without AD   0.86 m/s with cane   0.71 m/s wo cane         BURGER 30/56 33/56 34/56 14/56 7/56 8/56   mCTSIB  - FTEO (firm)  - FTEC (firm)  - FTEO (foam)  - FTEC (foam)   30 sec +  30 ++  2 sec  0 sec   30 sec+  30 sec+  3 sec  0 sec   30 sec+  30 sec+  3 sec   0 sec      6MWT 100 ft (attempted but only able to complete 1 minute) 275 ft in 4 minutes  212 ft in 2 minutes w no cane   2MWT: 84 ft with RW and CG 2MWT: 104 ft with RW and min A   ABC 47.5% 81.88% 72.5%

## 2025-06-04 ENCOUNTER — APPOINTMENT (OUTPATIENT)
Dept: LAB | Facility: CLINIC | Age: 65
End: 2025-06-04
Payer: MEDICARE

## 2025-06-04 DIAGNOSIS — Z86.718 HISTORY OF DVT (DEEP VEIN THROMBOSIS): ICD-10-CM

## 2025-06-04 LAB
BASOPHILS # BLD AUTO: 0.04 THOUSANDS/ÂΜL (ref 0–0.1)
BASOPHILS NFR BLD AUTO: 1 % (ref 0–1)
EOSINOPHIL # BLD AUTO: 0.1 THOUSAND/ÂΜL (ref 0–0.61)
EOSINOPHIL NFR BLD AUTO: 2 % (ref 0–6)
ERYTHROCYTE [DISTWIDTH] IN BLOOD BY AUTOMATED COUNT: 18.3 % (ref 11.6–15.1)
HCT VFR BLD AUTO: 33.2 % (ref 36.5–49.3)
HGB BLD-MCNC: 9.8 G/DL (ref 12–17)
IMM GRANULOCYTES # BLD AUTO: 0.03 THOUSAND/UL (ref 0–0.2)
IMM GRANULOCYTES NFR BLD AUTO: 1 % (ref 0–2)
LYMPHOCYTES # BLD AUTO: 0.95 THOUSANDS/ÂΜL (ref 0.6–4.47)
LYMPHOCYTES NFR BLD AUTO: 21 % (ref 14–44)
MCH RBC QN AUTO: 30.9 PG (ref 26.8–34.3)
MCHC RBC AUTO-ENTMCNC: 29.5 G/DL (ref 31.4–37.4)
MCV RBC AUTO: 105 FL (ref 82–98)
MONOCYTES # BLD AUTO: 0.57 THOUSAND/ÂΜL (ref 0.17–1.22)
MONOCYTES NFR BLD AUTO: 13 % (ref 4–12)
NEUTROPHILS # BLD AUTO: 2.85 THOUSANDS/ÂΜL (ref 1.85–7.62)
NEUTS SEG NFR BLD AUTO: 62 % (ref 43–75)
NRBC BLD AUTO-RTO: 0 /100 WBCS
PLATELET # BLD AUTO: 82 THOUSANDS/UL (ref 149–390)
PMV BLD AUTO: 13.3 FL (ref 8.9–12.7)
RBC # BLD AUTO: 3.17 MILLION/UL (ref 3.88–5.62)
WBC # BLD AUTO: 4.54 THOUSAND/UL (ref 4.31–10.16)

## 2025-06-04 PROCEDURE — 85025 COMPLETE CBC W/AUTO DIFF WBC: CPT

## 2025-06-04 PROCEDURE — 36415 COLL VENOUS BLD VENIPUNCTURE: CPT

## 2025-06-05 ENCOUNTER — OFFICE VISIT (OUTPATIENT)
Facility: CLINIC | Age: 65
End: 2025-06-05
Payer: MEDICARE

## 2025-06-05 ENCOUNTER — OFFICE VISIT (OUTPATIENT)
Facility: CLINIC | Age: 65
End: 2025-06-05
Attending: PHYSICIAN ASSISTANT
Payer: MEDICARE

## 2025-06-05 DIAGNOSIS — Z78.9 SELF-CARE DEFICIT: ICD-10-CM

## 2025-06-05 DIAGNOSIS — R53.1 WEAKNESS GENERALIZED: ICD-10-CM

## 2025-06-05 DIAGNOSIS — R26.2 DIFFICULTY WALKING: Primary | ICD-10-CM

## 2025-06-05 DIAGNOSIS — I69.319 COGNITIVE DEFICITS FOLLOWING CEREBRAL INFARCTION: ICD-10-CM

## 2025-06-05 DIAGNOSIS — Z86.73 H/O: STROKE: Primary | ICD-10-CM

## 2025-06-05 PROCEDURE — 97530 THERAPEUTIC ACTIVITIES: CPT

## 2025-06-05 PROCEDURE — 97110 THERAPEUTIC EXERCISES: CPT | Performed by: PHYSICAL THERAPIST

## 2025-06-05 PROCEDURE — 97530 THERAPEUTIC ACTIVITIES: CPT | Performed by: PHYSICAL THERAPIST

## 2025-06-05 PROCEDURE — 97112 NEUROMUSCULAR REEDUCATION: CPT

## 2025-06-05 NOTE — PROGRESS NOTES
Daily Note     Today's date: 2025  Patient name: Asad Galloway  : 1960  MRN: [id number]  Referring provider: Mallorie Ulloa*  Dx:   Encounter Diagnosis     ICD-10-CM    1. Difficulty walking  R26.2       2. Weakness generalized  R53.1                     DO NOT OFFER WATER DUE TO FLUID RESTRICTION (dialysis)       Subjective: Patient reports to PT with RW, no new complaints      Objective: See treatment diary below    NMR:  - Nustep L1 x 5 min (no rest breaks, able to perform consecutively)   - Ambulation with RW around clinic 80 ft x 2, CG  - STS with 1 airex pad on chair: 3 reps x 4 sets, CG      Active Rest Break:  - LAQ         Assessment: Pt tolerated treatment well with focus on functional mobility. Pt overall CG today for sit to stands and ambulation with RW. He does require motivation to participate due to reports of fatigue. Pt tearful during session stating he doesn't want to die. He had 1 coughing episode during session and became anxious that he could not breathe; however pt was breathing and had palpable pulse. Pt's wife notified and informed PT that he does have coughing fits like that at home and it makes him anxious. She was present for remainder of session. Continued to recommend use of RW for all mobility at home. Patient would benefit from continued PT to focus on gait and transfers, with progression to more balance training.      Plan: Continue per plan of care.  Pt's wife inquiring about rollator; practice with pt next visit to assess for safety.    POC expires Unit limit Auth Expiration date PT/OT + Visit Limit? Co-Insurance      Bomn primary, 30pcy secondary Yes                                            Outcome Measures Initial Eval  24 Re-eval  10/10/24 Re eval   24 Re-eval  25 Re-eval  25 Re-eval  25   5xSTS 15.85 sec, 2 UE  15.41 sec, 2 UE  21.06 sec, pushing off thighs  46.97 sec pushing off thighs + mod A Only performed 3 reps, 25.63 sec 43.48  sec pushing off armrests + mod A   TUG  - Regular  - Cognitive   17.3 sec, no AD  24.3 sec, (serial 3s)   12.59 sec, no AD  18.56 sec, no AD (serial 3s)   16. 44 sec, pushing off thighs  19.74 sec, serial 3s   22.11 sec with RW    27.53 sec with RW, serial 3s   24.52 sec with RW   43.59 sec with RW   10 meter   1.18 m/s without AD   0.86 m/s with cane   0.71 m/s wo cane         BURGER 30/56 33/56 34/56 14/56 7/56 8/56   mCTSIB  - FTEO (firm)  - FTEC (firm)  - FTEO (foam)  - FTEC (foam)   30 sec +  30 ++  2 sec  0 sec   30 sec+  30 sec+  3 sec  0 sec   30 sec+  30 sec+  3 sec   0 sec      6MWT 100 ft (attempted but only able to complete 1 minute) 275 ft in 4 minutes  212 ft in 2 minutes w no cane   2MWT: 84 ft with RW and CG 2MWT: 104 ft with RW and min A   ABC 47.5% 81.88% 72.5%

## 2025-06-05 NOTE — PROGRESS NOTES
"Daily Note     Today's date: 2025  Patient name: Asad Galloway  : 1960  MRN: [id number]  Referring provider: Mallorie Ulloa*  Dx:   Encounter Diagnoses   Name Primary?    H/O: stroke Yes    Self-care deficit     Cognitive deficits following cerebral infarction        Start Time: 7  Stop Time: 1800  Total time in clinic (min): 43 minutes    PLAN OF CARE START: 25  PLAN OF CARE END: 25  FREQUENCY: 2x/week  PRECAUTIONS dialysis 3x/week; NO FOOD OR WATER CAN BE GIVEN TO PATIENT; scared of dogs; do not take BP in L UE    Subjective: Pt reports being tired today.     Objective: See treatment below.    TA/NMR:   Completed STS with of 2-3 reps to work on functional transitions and activity tolerance.   Completed yellow flex bar bends for bilateral UE strength,  strength. 30x down and 30x twists  Hand exerciser with 2 rubber bands picks up 1\" foam blocks and transfers into container on table with 1 lb wrist weight donned bilaterally. Working on UE and hand strength.   Pt builds Jenga tower with bilateral hands to work on FMC, dexterity. Pt then pulls out one piece at a time designated by the therapist to address fine graded control.   Pt in standing at counter top engaged game of rummy with the therapist, pt working on bilateral FMC, attention, EF skills, functional activity tolerance. Pt maintained standing for ~8 minutes before requiring a rest period.     Assessment: Tolerated treatment well. Pt demonstrating in session impaired activity tolerance, and deficits to UE strength, and functional cognition. Pt would benefit from continued OT services to address strength, endurance, and activity tolerance for ADLs and IADLs.       Plan: Continued skilled OT per POC.        "

## 2025-06-10 ENCOUNTER — OFFICE VISIT (OUTPATIENT)
Facility: CLINIC | Age: 65
End: 2025-06-10
Payer: MEDICARE

## 2025-06-10 DIAGNOSIS — Z86.73 H/O: STROKE: Primary | ICD-10-CM

## 2025-06-10 DIAGNOSIS — Z78.9 SELF-CARE DEFICIT: ICD-10-CM

## 2025-06-10 DIAGNOSIS — I69.319 COGNITIVE DEFICITS FOLLOWING CEREBRAL INFARCTION: ICD-10-CM

## 2025-06-10 DIAGNOSIS — R53.1 WEAKNESS GENERALIZED: ICD-10-CM

## 2025-06-10 DIAGNOSIS — R26.2 DIFFICULTY WALKING: Primary | ICD-10-CM

## 2025-06-10 PROCEDURE — 97530 THERAPEUTIC ACTIVITIES: CPT | Performed by: PHYSICAL THERAPIST

## 2025-06-10 PROCEDURE — 97530 THERAPEUTIC ACTIVITIES: CPT

## 2025-06-10 NOTE — PROGRESS NOTES
"Daily Note     Today's date: 6/10/2025  Patient name: Asad Galloway  : 1960  MRN: [id number]  Referring provider: Mallorie Ulloa*  Dx:   Encounter Diagnoses   Name Primary?    H/O: stroke Yes    Self-care deficit     Cognitive deficits following cerebral infarction                     PLAN OF CARE START: 25  PLAN OF CARE END: 25  FREQUENCY: 2x/week  PRECAUTIONS dialysis 3x/week; NO FOOD OR WATER CAN BE GIVEN TO PATIENT; scared of dogs; do not take BP in L UE    Subjective: Pt reports he would rather do home OT.  Of note, verbal altercation with his wife upon presentation to facility.  Pt reporting he doesn't want to come here but is agreeable to session.  Also noted to call various family members various profane names.  Not verbally aggressive towards therapist but cooperation is less than usual throughout.    Objective: See treatment below.    TA/NMR:   Standing card matching activity trialed, however pt initially stands for ~5 min, then second stand refuses to continue past 30 seconds.  Was agreeable to STS for each rep instead.  2 cards, 4 components  Connect 4 activity performed seated 2* pt refusal to stand.  Pt then stating a food that begins with each letter as marked on grid.  Focus on planning, , sustained attention and divided attention.  Requires mod cues for direction following, however anticipate that it was largely behavioral, as he consistently demonstrates that he knows the steps of activity when therapist is performing.  Fair problem solving    -Discussed pt's dissatisfaction with needing therapy.  Educated pt that he is able to receive OP at home if he chooses, but that if he chooses to return to this facility he needs to be agreeable to participating in activities to make progress.  Pt reports being in agreement, however when wife mentions it in the waiting room he yells \"I DON'T GIVE A SH*T\"    Assessment: Tolerated treatment poor-fairly.  Limited by self limiting " behaviors.  Demonstrating continued deficits with b/l UE, standing tolerance, and cognition. Pt would benefit from continued OT services to address strength, endurance, and activity tolerance for ADLs and IADLs.       Plan: Continued skilled OT per POC.

## 2025-06-10 NOTE — PROGRESS NOTES
"Daily Note     Today's date: 6/10/2025  Patient name: Asad Galloway  : 1960  MRN: [id number]  Referring provider: Mallorie Ulloa*  Dx:   Encounter Diagnosis     ICD-10-CM    1. Difficulty walking  R26.2       2. Weakness generalized  R53.1                       DO NOT OFFER WATER DUE TO FLUID RESTRICTION (dialysis)       Subjective: Patient in car at start of session refusing to come into clinic \"I hate it here.\" After speaking with pt in the car, he was agreeable to come inside and give it another shot, but he reports he preferred home PT.      Objective: See treatment diary below    NMR:  - Nustep L1 x 5 min (break at 3 min)  - STS with 1 airex pad on chair: 3 reps x 3 sets, min A  - Ambulation with RW within clinic 80 ft, CG    TA:   - Encouragement to participate in outpatient PT, review of POC and goals, pt progress    Assessment: Pt tolerated treatment fair today, requiring much encouragement to participate. Currently only focusing on sit to stands and walking in order to reduce caregiver burden and maximize pt independence, and because pt is not really agreeable to performing any other exercises. He is most receptive to functional tasks. Patient would benefit from continued PT to focus on gait and transfers, with progression to more balance training.      Plan: Continue per plan of care.     POC expires Unit limit Auth Expiration date PT/OT + Visit Limit? Co-Insurance      Bomn primary, 30pcy secondary Yes                                            Outcome Measures Initial Eval  24 Re-eval  10/10/24 Re eval   24 Re-eval  25 Re-eval  25 Re-eval  25   5xSTS 15.85 sec, 2 UE  15.41 sec, 2 UE  21.06 sec, pushing off thighs  46.97 sec pushing off thighs + mod A Only performed 3 reps, 25.63 sec 43.48 sec pushing off armrests + mod A   TUG  - Regular  - Cognitive   17.3 sec, no AD  24.3 sec, (serial 3s)   12.59 sec, no AD  18.56 sec, no AD (serial 3s)   16. 44 sec, pushing off " thighs  19.74 sec, serial 3s   22.11 sec with RW    27.53 sec with RW, serial 3s   24.52 sec with RW   43.59 sec with RW   10 meter   1.18 m/s without AD   0.86 m/s with cane   0.71 m/s wo cane         BURGER 30/56 33/56 34/56 14/56 7/56 8/56   mCTSIB  - FTEO (firm)  - FTEC (firm)  - FTEO (foam)  - FTEC (foam)   30 sec +  30 ++  2 sec  0 sec   30 sec+  30 sec+  3 sec  0 sec   30 sec+  30 sec+  3 sec   0 sec      6MWT 100 ft (attempted but only able to complete 1 minute) 275 ft in 4 minutes  212 ft in 2 minutes w no cane   2MWT: 84 ft with RW and CG 2MWT: 104 ft with RW and min A   ABC 47.5% 81.88% 72.5%

## 2025-06-12 ENCOUNTER — OFFICE VISIT (OUTPATIENT)
Facility: CLINIC | Age: 65
End: 2025-06-12
Payer: MEDICARE

## 2025-06-12 ENCOUNTER — TRANSITIONAL CARE MANAGEMENT (OUTPATIENT)
Dept: FAMILY MEDICINE CLINIC | Facility: CLINIC | Age: 65
End: 2025-06-12

## 2025-06-12 ENCOUNTER — OFFICE VISIT (OUTPATIENT)
Facility: CLINIC | Age: 65
End: 2025-06-12
Attending: PHYSICIAN ASSISTANT
Payer: MEDICARE

## 2025-06-12 DIAGNOSIS — I69.319 COGNITIVE DEFICITS FOLLOWING CEREBRAL INFARCTION: ICD-10-CM

## 2025-06-12 DIAGNOSIS — R26.2 DIFFICULTY WALKING: Primary | ICD-10-CM

## 2025-06-12 DIAGNOSIS — R53.1 WEAKNESS GENERALIZED: ICD-10-CM

## 2025-06-12 DIAGNOSIS — Z86.73 H/O: STROKE: Primary | ICD-10-CM

## 2025-06-12 DIAGNOSIS — Z78.9 SELF-CARE DEFICIT: ICD-10-CM

## 2025-06-12 PROCEDURE — 97530 THERAPEUTIC ACTIVITIES: CPT | Performed by: PHYSICAL THERAPIST

## 2025-06-12 PROCEDURE — 97530 THERAPEUTIC ACTIVITIES: CPT

## 2025-06-12 NOTE — PROGRESS NOTES
"Daily Note     Today's date: 2025  Patient name: Asad Galloway  : 1960  MRN: [id number]  Referring provider: Mallorie Ulloa*  Dx:   Encounter Diagnoses   Name Primary?    H/O: stroke Yes    Self-care deficit     Cognitive deficits following cerebral infarction      Start Time:   Stop Time: 171  Total time in clinic (min): 42 minutes    PLAN OF CARE START: 25  PLAN OF CARE END: 25  FREQUENCY: 2x/week  PRECAUTIONS dialysis 3x/week; NO FOOD OR WATER CAN BE GIVEN TO PATIENT; scared of dogs; do not take BP in L UE    Subjective: \"I feel weak\"    Objective: See treatment below.    TA:  Overhead bar raises with 1 lb bar bell for a total of 2 minutes of exercises, then the same for chest presses. However frequent rest periods, so taking a total of about 12-13 minutes to complete both exercises. Working on functional activity tolerance, UE endurance.   Foam block  with hand exerciser with 2 rubber bands for resistance. Performed for ~15 minutes.   Engaged in games of Neozone-4 with the therapist, with therapist having the patient work on planning and problem solving for EF skills. Also address FMC/dexterity with the L hand thru the exercise.     Assessment: Tolerated treatment fair.  Limited by self limiting behaviors. Pt was quiet thru the session and required max verbal cues to engage in exercises. Demonstrating continued deficits with b/l UE, standing tolerance, and cognition. Pt would benefit from continued OT services to address strength, endurance, and activity tolerance for ADLs and IADLs.       Plan: Continued skilled OT per POC.        "

## 2025-06-12 NOTE — PROGRESS NOTES
Daily Note     Today's date: 2025  Patient name: Asad Galloway  : 1960  MRN: [id number]  Referring provider: Mallorie Ulloa*  Dx:   Encounter Diagnosis     ICD-10-CM    1. Difficulty walking  R26.2       2. Weakness generalized  R53.1                         DO NOT OFFER WATER DUE TO FLUID RESTRICTION (dialysis)       Subjective: Patient arrives with RW and his wife, no new complaints.     Objective: See treatment diary below    NMR:  - Nustep L1 x 5 min (break at 4 min)  - STS with 1 airex pad on chair: 3 reps x 3 sets, min A  - Sidestepping along length of mat table x 2  - Ambulation with RW within clinic 20 ft x 2, CG      Assessment: Pt tolerated treatment fair today, with improved participation with wife present. Pt limited by bouts of dizziness, likely due to cardiac hx and dialysis and heat today, encouraged to use breathing techniques to work through it and to reduce anxiety. Requires vc's for hand placement during sit to stand. Patient would benefit from continued PT to focus on gait and transfers, with progression to more balance training.      Plan: Continue per plan of care.     POC expires Unit limit Auth Expiration date PT/OT + Visit Limit? Co-Insurance      Bomn primary, 30pcy secondary Yes                                            Outcome Measures Initial Eval  24 Re-eval  10/10/24 Re eval   24 Re-eval  25 Re-eval  25 Re-eval  25   5xSTS 15.85 sec, 2 UE  15.41 sec, 2 UE  21.06 sec, pushing off thighs  46.97 sec pushing off thighs + mod A Only performed 3 reps, 25.63 sec 43.48 sec pushing off armrests + mod A   TUG  - Regular  - Cognitive   17.3 sec, no AD  24.3 sec, (serial 3s)   12.59 sec, no AD  18.56 sec, no AD (serial 3s)   16. 44 sec, pushing off thighs  19.74 sec, serial 3s   22.11 sec with RW    27.53 sec with RW, serial 3s   24.52 sec with RW   43.59 sec with RW   10 meter   1.18 m/s without AD   0.86 m/s with cane   0.71 m/s wo cane         BURGER  30/56 33/56 34/56 14/56 7/56 8/56   mCTSIB  - FTEO (firm)  - FTEC (firm)  - FTEO (foam)  - FTEC (foam)   30 sec +  30 ++  2 sec  0 sec   30 sec+  30 sec+  3 sec  0 sec   30 sec+  30 sec+  3 sec   0 sec      6MWT 100 ft (attempted but only able to complete 1 minute) 275 ft in 4 minutes  212 ft in 2 minutes w no cane   2MWT: 84 ft with RW and CG 2MWT: 104 ft with RW and min A   ABC 47.5% 81.88% 72.5%

## 2025-06-17 ENCOUNTER — OFFICE VISIT (OUTPATIENT)
Facility: CLINIC | Age: 65
End: 2025-06-17
Payer: MEDICARE

## 2025-06-17 ENCOUNTER — OFFICE VISIT (OUTPATIENT)
Facility: CLINIC | Age: 65
End: 2025-06-17
Attending: PHYSICIAN ASSISTANT
Payer: MEDICARE

## 2025-06-17 ENCOUNTER — TELEPHONE (OUTPATIENT)
Dept: CARDIOLOGY CLINIC | Facility: CLINIC | Age: 65
End: 2025-06-17

## 2025-06-17 DIAGNOSIS — R26.2 DIFFICULTY WALKING: Primary | ICD-10-CM

## 2025-06-17 DIAGNOSIS — Z95.5 STATUS POST INSERTION OF DRUG ELUTING CORONARY ARTERY STENT: ICD-10-CM

## 2025-06-17 DIAGNOSIS — R53.1 WEAKNESS GENERALIZED: ICD-10-CM

## 2025-06-17 DIAGNOSIS — I25.5 ISCHEMIC CARDIOMYOPATHY: ICD-10-CM

## 2025-06-17 DIAGNOSIS — I69.319 COGNITIVE DEFICITS FOLLOWING CEREBRAL INFARCTION: ICD-10-CM

## 2025-06-17 DIAGNOSIS — Z78.9 SELF-CARE DEFICIT: ICD-10-CM

## 2025-06-17 DIAGNOSIS — Z86.73 H/O: STROKE: Primary | ICD-10-CM

## 2025-06-17 PROBLEM — J18.9 PNEUMONIA: Status: RESOLVED | Noted: 2025-05-18 | Resolved: 2025-06-17

## 2025-06-17 PROBLEM — A41.9 SEPSIS (HCC): Status: RESOLVED | Noted: 2025-05-18 | Resolved: 2025-06-17

## 2025-06-17 PROCEDURE — 97530 THERAPEUTIC ACTIVITIES: CPT | Performed by: PHYSICAL THERAPIST

## 2025-06-17 PROCEDURE — 97110 THERAPEUTIC EXERCISES: CPT | Performed by: PHYSICAL THERAPIST

## 2025-06-17 RX ORDER — PRASUGREL 10 MG/1
10 TABLET, FILM COATED ORAL DAILY
Qty: 90 TABLET | Refills: 3 | OUTPATIENT
Start: 2025-06-17

## 2025-06-17 NOTE — TELEPHONE ENCOUNTER
PA for prasugrel 10 mg  APPROVED     Date(s) approved May 18, 2025 to June 17, 2026     Case #[id number]     Patient advised by          [x]MegaBitshart Message  []Phone call   [x]LMOM  []L/M to call office as no active Communication consent on file  []Unable to leave detailed message as VM not approved on Communication consent       Pharmacy advised by    []Fax  []Phone call  []Secure Chat    Specialty Pharmacy    []     Approval letter scanned into Media No        
PA for prasugrel 10 mg SUBMITTED to [facility]     via    []CMM-KEY:   [x]Surescripts-Case ID # [id number]   []Availity-Auth ID # NDC #   []Faxed to plan   []Other website   []Phone call Case ID #     []PA sent as URGENT    All office notes, labs and other pertaining documents and studies sent. Clinical questions answered. Awaiting determination from insurance company.     Turnaround time for your insurance to make a decision on your Prior Authorization can take 7-21 business days.                 
No

## 2025-06-17 NOTE — PROGRESS NOTES
"Daily Note     Today's date: 2025  Patient name: Asad Galloway  : 1960  MRN: [id number]  Referring provider: Mallorie Ulloa*  Dx:   Encounter Diagnoses   Name Primary?    H/O: stroke Yes    Self-care deficit     Cognitive deficits following cerebral infarction                   PLAN OF CARE START: 25  PLAN OF CARE END: 25  FREQUENCY: 2x/week  PRECAUTIONS dialysis 3x/week; NO FOOD OR WATER CAN BE GIVEN TO PATIENT; scared of dogs; do not take BP in L UE    Subjective: Pt presents agitated again, reporting he doesn't want to do OT.  Educated on goals and purpose, however pt not receptive.  Reports he will wait in the waiting room until his wife is done at the gym, then asking for walking distance restaurants in the area.  Wife contacted, as pt is not safe to be alone.  Wife then arrives, tries to encourage pt to participate.  Pt becomes more agitated.  States \"I don't give a shit! I need to rest!..I'm sick.\"  Pt then reports that he will be agreeable next week if he can rest today.  Wife reports his son plans to come to session next week.    Objective: See treatment below.      Assessment: Pt was not agreeable and unpleasant towards therapist and wife.  Did not perform any interventions other then pt and family education. Pt would benefit from continued OT services to address strength, endurance, and activity tolerance for ADLs and IADLs, AS BEHAVIOR ALLOWS, however also may get more benefit from home care to address self care in his home environment.  Without family present, pt has demonstrated that he is able to complete a majority of self care with Roni-set-up.  At home, he's max-totalA.  Severely limited by his self limiting behavior, which may be better addressed in home environment.      Plan: Continued skilled OT per POC.        "

## 2025-06-17 NOTE — PROGRESS NOTES
"Daily Note     Today's date: 2025  Patient name: Asad Galloway  : 1960  MRN: [id number]  Referring provider: Mallorie Ulloa*  Dx:   Encounter Diagnosis     ICD-10-CM    1. Difficulty walking  R26.2       2. Weakness generalized  R53.1                           DO NOT OFFER WATER DUE TO FLUID RESTRICTION (dialysis)       Subjective: Patient arrives with brand new rollator and his wife. Pt agreeable to PT but reports he doesn't want to do OT anymore \"it's not helping me\"    Objective: See treatment diary below    NMR:  - Nustep L1 x 6 min consecutively  - STS, no airex today: 3 reps x 3 sets, min A  - Ambulation with rollator x 80 ft x 2, CG      Assessment: Pt tolerated treatment fair today. Progressed to 6 min consecutively on Nustep today. Also progressed to sit to stands from standard chair height, still with min A. He got a new rollator and has been using since Friday with no reported issues. He demonstrates difficulty remembering when to lock brakes (prior to standing, prior to sitting), but when ambulating around clinic no LOB or issues observed. Reviewed safe use of brakes with pt and wife at end of session. Patient would benefit from continued PT to focus on gait and transfers, with progression to more balance training.      Plan: Continue per plan of care.  Pt scheduled for TAVR on .    POC expires Unit limit Auth Expiration date PT/OT + Visit Limit? Co-Insurance      Bomn primary, 30pcy secondary Yes                                            Outcome Measures Initial Eval  24 Re-eval  10/10/24 Re eval   24 Re-eval  25 Re-eval  25 Re-eval  25   5xSTS 15.85 sec, 2 UE  15.41 sec, 2 UE  21.06 sec, pushing off thighs  46.97 sec pushing off thighs + mod A Only performed 3 reps, 25.63 sec 43.48 sec pushing off armrests + mod A   TUG  - Regular  - Cognitive   17.3 sec, no AD  24.3 sec, (serial 3s)   12.59 sec, no AD  18.56 sec, no AD (serial 3s)   16. 44 sec, " pushing off thighs  19.74 sec, serial 3s   22.11 sec with RW    27.53 sec with RW, serial 3s   24.52 sec with RW   43.59 sec with RW   10 meter   1.18 m/s without AD   0.86 m/s with cane   0.71 m/s wo cane         JENNYFER 30/56 33/56 34/56 14/56 7/56 8/56   mCTSIB  - FTEO (firm)  - FTEC (firm)  - FTEO (foam)  - FTEC (foam)   30 sec +  30 ++  2 sec  0 sec   30 sec+  30 sec+  3 sec  0 sec   30 sec+  30 sec+  3 sec   0 sec      6MWT 100 ft (attempted but only able to complete 1 minute) 275 ft in 4 minutes  212 ft in 2 minutes w no cane   2MWT: 84 ft with RW and CG 2MWT: 104 ft with RW and min A   ABC 47.5% 81.88% 72.5%

## 2025-06-18 ENCOUNTER — TELEPHONE (OUTPATIENT)
Age: 65
End: 2025-06-18

## 2025-06-18 NOTE — TELEPHONE ENCOUNTER
"Safety checklist prior to [facility] will visit:    \"Thank you for inviting us to your home.  In order to ensure we arrive politely, and conduct ourselves safely in your home, please answer the following questions:\"    1 - Where should we park when we arrive?       Driveway or street    2 - Are there security doors, rosenthal, or fences that we will need a code for?       Ring front doorbell    3 - Who else will be present at the visit to support you? Patient, spouse, and their son Sam       \"We want to conduct the visit with you in the way that feels most comfortable for you.  If we need to schedule around another person's time, please let us know.\"    4 - Do you have any pets or other animals in your home?       no       \"If you have a dog, please make sure they are crated, leashed, or placed in a separate room.  Birds, reptiles, and other small animals like gerbils should be kept in a safe enclosure.  This helps our team stay on track with your visit.  We are here to see you, not your pets!\"    5 - Do you have any firearms or other weapons in the home?       no       \"Please store and lock all weapons prior to our arrival.\"    6 - Do you feel acutely sick, or have you had recent sick contacts? no       \"If you have upper respiratory symptoms, such as cough, sneeze, congestion, or a cold, please use a mask for the safety of our team.  We will always mask for your safety as well.\"    7 - Are there any environmental hazards around or near your home now, or at the time of our visit?   no   \"Environmental hazards may include exterior items like storm damage or road construction.  Internal hazards might be home renovations, or pests like ants, flies, or mice.\"    8 - Please have all medicines prescribed to you set up where they may be reviewed for safety. Reviewed with marianela Vargas    9 - If you use marijuana medically, or any recreational drugs, please store these out of sight prior to our arrival. " reviewed

## 2025-06-19 ENCOUNTER — OFFICE VISIT (OUTPATIENT)
Facility: CLINIC | Age: 65
End: 2025-06-19
Payer: MEDICARE

## 2025-06-19 ENCOUNTER — APPOINTMENT (OUTPATIENT)
Dept: LAB | Facility: CLINIC | Age: 65
End: 2025-06-19
Payer: MEDICARE

## 2025-06-19 ENCOUNTER — IN HOME VISIT (OUTPATIENT)
Age: 65
End: 2025-06-19
Payer: MEDICARE

## 2025-06-19 VITALS
DIASTOLIC BLOOD PRESSURE: 54 MMHG | BODY MASS INDEX: 30.04 KG/M2 | HEART RATE: 81 BPM | RESPIRATION RATE: 20 BRPM | HEIGHT: 69 IN | TEMPERATURE: 96.6 F | WEIGHT: 202.82 LBS | SYSTOLIC BLOOD PRESSURE: 91 MMHG

## 2025-06-19 DIAGNOSIS — Z51.5 PALLIATIVE CARE BY SPECIALIST: ICD-10-CM

## 2025-06-19 DIAGNOSIS — I50.20 HFREF (HEART FAILURE WITH REDUCED EJECTION FRACTION) (HCC): Primary | ICD-10-CM

## 2025-06-19 DIAGNOSIS — Z86.718 PERSONAL HISTORY OF VENOUS THROMBOSIS AND EMBOLISM: ICD-10-CM

## 2025-06-19 DIAGNOSIS — I69.319 COGNITIVE DEFICITS FOLLOWING CEREBRAL INFARCTION: ICD-10-CM

## 2025-06-19 DIAGNOSIS — R53.1 WEAKNESS GENERALIZED: ICD-10-CM

## 2025-06-19 DIAGNOSIS — N18.6 ESRD ON DIALYSIS (HCC): ICD-10-CM

## 2025-06-19 DIAGNOSIS — Z86.73 H/O: STROKE: Primary | ICD-10-CM

## 2025-06-19 DIAGNOSIS — Z99.2 ESRD ON DIALYSIS (HCC): ICD-10-CM

## 2025-06-19 DIAGNOSIS — Z78.9 SELF-CARE DEFICIT: ICD-10-CM

## 2025-06-19 DIAGNOSIS — R26.2 DIFFICULTY WALKING: Primary | ICD-10-CM

## 2025-06-19 PROCEDURE — 97110 THERAPEUTIC EXERCISES: CPT

## 2025-06-19 PROCEDURE — 99349 HOME/RES VST EST MOD MDM 40: CPT

## 2025-06-19 PROCEDURE — 97530 THERAPEUTIC ACTIVITIES: CPT

## 2025-06-19 PROCEDURE — 97530 THERAPEUTIC ACTIVITIES: CPT | Performed by: PHYSICAL THERAPIST

## 2025-06-19 PROCEDURE — G2211 COMPLEX E/M VISIT ADD ON: HCPCS

## 2025-06-19 NOTE — PROGRESS NOTES
"Daily Note     Today's date: 2025  Patient name: Asad Galloway  : 1960  MRN: [id number]  Referring provider: Mallorie Ulloa*  Dx:   Encounter Diagnosis     ICD-10-CM    1. Difficulty walking  R26.2       2. Weakness generalized  R53.1                             DO NOT OFFER WATER DUE TO FLUID RESTRICTION (dialysis)       Subjective: Patient arrives with new rollator and his wife.    Objective: See treatment diary below    NMR:  - Nustep L1 x 6 min consecutively  - STS, 1 airex today: 3 reps x 2 sets, min A  - Ambulation with rollator x 80 ft, x 40 ft, CG  - [address] stairs (4\") with BHR's x 5 reps, CG      Assessment: Pt does fairly well with focus on functional tasks, with multiple rest breaks incorporated. Able to tolerate 6 min on Nustep again today. Able to progress to some stair training, although rest break required after 5 reps. He is significantly deconditioned and still requires much encouragement to participate. Fair carryover of rollator brake management. Patient would benefit from continued PT to focus on gait and transfers, with progression to more balance training.      Plan: Continue per plan of care.  Pt scheduled for TAVR on .    POC expires Unit limit Auth Expiration date PT/OT + Visit Limit? Co-Insurance      Bomn primary, 30pcy secondary Yes                                            Outcome Measures Initial Eval  24 Re-eval  10/10/24 Re eval   24 Re-eval  25 Re-eval  25 Re-eval  25   5xSTS 15.85 sec, 2 UE  15.41 sec, 2 UE  21.06 sec, pushing off thighs  46.97 sec pushing off thighs + mod A Only performed 3 reps, 25.63 sec 43.48 sec pushing off armrests + mod A   TUG  - Regular  - Cognitive   17.3 sec, no AD  24.3 sec, (serial 3s)   12.59 sec, no AD  18.56 sec, no AD (serial 3s)   16. 44 sec, pushing off thighs  19.74 sec, serial 3s   22.11 sec with RW    27.53 sec with RW, serial 3s   24.52 sec with RW   43.59 sec with RW   10 meter   1.18 m/s " without AD   0.86 m/s with cane   0.71 m/s wo cane         BURGER 30/56 33/56 34/56 14/56 7/56 8/56   mCTSIB  - FTEO (firm)  - FTEC (firm)  - FTEO (foam)  - FTEC (foam)   30 sec +  30 ++  2 sec  0 sec   30 sec+  30 sec+  3 sec  0 sec   30 sec+  30 sec+  3 sec   0 sec      6MWT 100 ft (attempted but only able to complete 1 minute) 275 ft in 4 minutes  212 ft in 2 minutes w no cane   2MWT: 84 ft with RW and CG 2MWT: 104 ft with RW and min A   ABC 47.5% 81.88% 72.5%

## 2025-06-19 NOTE — PROGRESS NOTES
[facility] Visit: follow up   Name: Asad Galloway      : 1960      MRN: [id number]  Encounter Provider: BRITTANY Menendez  Encounter Date: 2025   Encounter department: [facility]    Assessment & Plan   1. HFrEF (heart failure with reduced ejection fraction) ([facility])  Assessment & Plan:  Wt Readings from Last 3 Encounters:   25 92 kg (202 lb 13.2 oz)   25 91.6 kg (202 lb)   25 91.6 kg (202 lb)       Most recent EF 25% with evidence of severe aortic stenosis   patient gets weighed at HD, does not take weight at home..  TAVR scheduled for   Follow up with Cardiology on       2. ESRD on dialysis ([facility])  Assessment & Plan:  Lab Results   Component Value Date    EGFR 7 2025    EGFR 10 2025    EGFR 8 2025    CREATININE 7.10 (H) 2025    CREATININE 5.43 (H) 2025    CREATININE 6.59 (H) 2025     HD on MWF  AV fistula  Follows with nephrology    3. Palliative care by specialist  Assessment & Plan:  Patient follows with palliative care for psychosocial support and symptom management of ESRD, CHF, CVA  Goals of Care- Treatment focused. Goals remain clear at this time.  Symptoms - well controlled.   ACP -  nothing on file  Next visit - 6 months      Social support  Supportive listening provided  Normalized experience of patient/family  Provided anxiety containment  Provided anticipatory guidance  Patient lives with  Spouse  Has 4 adult children  All are involved in care        Subjective   Chief Complaint   Patient presents with    Consult     New home visit for psychosocial support and symptom management secondary to palliative diagnosis of end stage renal disease, heart failure with reduced ejection fraction.        History of Present Illness     Asad Galloway is a 65 y.o. male wpresents in follow up of symptoms related to ESRD, Chronic combined CHF, CVA.  The patient is seen in their home due to  "ambulatory difficulties related to their advanced illness.  Pertinent issues include: symptom management, assessment of goals of care, disease process education and discussion of prognosis, advance care planning      Patient is seen in the home with spouse/Eliana and palliative RN/CMOC present    10/2020- started HD, M-W-F.    TAVR scheduled for  in [place]  [facility]  Wearing life vest- will consider switch to pacemaker in August.     Will plan to see patient every 6 months for goal management.   Current Goals are clear to continue with treatments,  Discussed with son Sam- he states that end goal is to get kidney transplant.   Son reports that they are not interested in hospice/comfort cares.     Has apt coming up with wound management wound care.     Patient enjoys spending time with grandchildren.   At times will OT/PT when is he to tired.   Is able to get out of the house to therapies and apts.   Family is very supportive.         Current Medications[1]    Objective   BP 91/54 (BP Location: Right arm, Patient Position: Sitting, Cuff Size: Standard)   Pulse 81   Temp (!) 96.6 °F (35.9 °C) (Temporal)   Resp 20   Ht 5' 9\" (1.753 m)   Wt 92 kg (202 lb 13.2 oz)   BMI 29.95 kg/m²   Physical Exam  Vitals reviewed.   Constitutional:       General: He is not in acute distress.  HENT:      Head: Normocephalic and atraumatic.     Eyes:      General:         Right eye: No discharge.         Left eye: No discharge.       Cardiovascular:      Rate and Rhythm: Normal rate.   Pulmonary:      Effort: Pulmonary effort is normal.     Musculoskeletal:         General: Normal range of motion.      Cervical back: Normal range of motion.      Right lower le+ Edema present.      Left lower le+ Edema present.     Skin:     General: Skin is warm and dry.     Neurological:      Mental Status: He is alert and oriented to person, place, and time.     Psychiatric:         Behavior: Behavior normal. "          Recent labs:  Lab Results   Component Value Date/Time    SODIUM 137 05/23/2025 05:19 AM    SODIUM 139 01/11/2025 09:14 AM    K 4.4 05/23/2025 05:19 AM    K 3.9 01/11/2025 09:14 AM    BUN 42 (H) 05/23/2025 05:19 AM    BUN 20 01/11/2025 09:14 AM    CREATININE 7.10 (H) 05/23/2025 05:19 AM    CREATININE 4.64 (H) 01/11/2025 09:14 AM    GLUC 82 05/23/2025 05:19 AM    GLUC 211 (H) 01/11/2025 09:14 AM    CALCIUM 8.7 05/23/2025 05:19 AM    CALCIUM 7.8 (L) 01/11/2025 09:14 AM    AST 7 (L) 05/21/2025 06:00 AM    AST 4 01/11/2025 09:14 AM    ALT 5 (L) 05/21/2025 06:00 AM    ALT 4 01/11/2025 09:14 AM    ALB 3.8 05/21/2025 06:00 AM    ALB 3.3 (L) 01/11/2025 09:14 AM    TP 6.5 05/21/2025 06:00 AM    TP 6.1 (L) 01/11/2025 09:14 AM    EGFR 7 05/23/2025 05:19 AM    EGFR 13 (L) 01/11/2025 09:14 AM    EGFR 10 (L) 09/08/2020 03:35 PM     Lab Results   Component Value Date/Time    HGB 9.5 (L) 06/19/2025 12:37 PM    HGB 10.4 (L) 09/08/2020 03:35 PM    WBC 4.87 06/19/2025 12:37 PM    WBC 6.1 01/27/2016 11:19 AM    PLT 78 (L) 06/19/2025 12:37 PM     01/27/2016 11:19 AM    INR 1.34 (H) 05/21/2025 06:00 AM    INR 1.2 01/11/2025 09:14 AM     (HH) 05/19/2025 05:42 AM     Lab Results   Component Value Date/Time    TWY5LDNYUZRI 12.390 (H) 05/17/2025 03:34 AM       Recent Imaging:  Procedure: CT head without contrast  Result Date: 6/22/2025  Narrative: CT BRAIN - WITHOUT CONTRAST INDICATION:   Fall, anticoagulated. COMPARISON: 5/17/2025 TECHNIQUE:  CT examination of the brain was performed.  Multiplanar 2D reformatted images were created from the source data. Radiation dose length product (DLP) for this visit:  2343 mGy-cm. .  This examination, like all CT scans performed in the [facility], was performed utilizing techniques to minimize radiation dose exposure, including the use of iterative reconstruction and automated exposure control. IMAGE QUALITY:  Diagnostic. FINDINGS: PARENCHYMA: Decreased attenuation  is noted in periventricular and subcortical white matter demonstrating an appearance that is statistically most likely to represent moderate microangiopathic change. Chronic right frontal periventricular, left occipital region and left thalamic infarcts. No CT signs of acute infarction.  No intracranial mass, mass effect or midline shift.  No acute parenchymal hemorrhage. VENTRICLES AND EXTRA-AXIAL SPACES:  Normal for the patient's age. VISUALIZED ORBITS: Normal visualized orbits. PARANASAL SINUSES: Normal visualized paranasal sinuses. CALVARIUM AND EXTRACRANIAL SOFT TISSUES: Normal.     Impression: No acute intracranial abnormality. Chronic microangiopathic changes. Workstation performed: [id number]     Procedure: VAS upper limb venous duplex scan, complete, bilateral  Result Date: 5/29/2025  Narrative: [facility] REPORT . OMAR ANDERSON is a 65 year old M who was referred by JOLENE HSU.    Indications: Patient presents to determine propagation vs resolution of previously noted DVT in the left IJV discovered on 03/12/2025 at outside facility. Patient is on Eliquis.  Operative History: 07/26/2024 Left Popliteal angioplasty. 09/29/2023 Left Ir lower extremity angiogram w/ intervention. 02/21/2023 Cardiac catheterization. 05/02/2022 Cardiac catheterization. 10/28/2016 N/A Right 3rd Toe Amputation. Left Upper arm AVF  Risk Factors: The patient reports hypertension, hyperlipidemia, diabetes, PAD, renal disease and hx of DVT.    FINDINGS: Main                                      Segment Impression Right                                                     Cephalic Vein - AC Fossa            Chronic Thrombus    Cephalic Vein - Forearm Mid         Chronic Thrombus    Cephalic Vein - Forearm Distal      Chronic Thrombus CONCLUSION:  RIGHT UPPER LIMB: No evidence of acute or chronic deep vein thrombosis.  Evidence of chronic superficial thrombophlebitis is noted in the cephalic vein at the elbow and the  mid-distal forearm. Doppler evaluation shows a normal response to augmentation maneuver.   LEFT UPPER LIMB: No evidence of acute or chronic deep vein thrombosis.  No evidence of superficial thrombophlebitis is noted. 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 Doppler evaluation shows a normal response to augmentation maneuver.  Compared to the previous study on 03/30/2025, there is resolution of the left IJV thrombus. SIGNATURE Signed by ADELFO RUST MD on 2025-05-29 17:00:43 [email or web address]    Procedure: XR hand 3+ vw left  Result Date: 5/22/2025  Narrative: XR HAND 3+ VW LEFT INDICATION: evaluate for osteo of 5th digit. COMPARISON: 1425 For the purposes of institution wide universal language the following terms will apply: (thumb=1st digit/finger, index finger=2nd digit/finger, long finger=3rd digit/finger, ring=4th digit/finger and small finger=5th digit/finger) FINDINGS: No acute fracture or dislocation. No significant degenerative changes. No cortical erosive or destructive change, periosteal reaction, or focal osteopenia to suggest osteomyelitis. No soft tissue gas or radiopaque foreign body.     Impression: No radiographic evidence of osteomyelitis. Computerized Assisted Algorithm (CAA) may have been used to analyze all applicable images. Workstation performed: [id number]     Procedure: Echo follow up/limited w/ contrast if indicated  Result Date: 5/18/2025  Narrative:   Left Ventricle: Left ventricular cavity size is mildly dilated. LVIDd is 5.90 cm. Wall thickness is mildly increased. There is mild to moderate concentric hypertrophy. The left ventricular ejection fraction is 25%. Systolic function is severely reduced. Wall motion is normal.   Right Ventricle: Right ventricular cavity size is moderately dilated.   Left Atrium:  The atrium is severely dilated (42-48 mL/m2).   Right Atrium: The atrium is moderately dilated.   Aortic Valve: The leaflets are moderately thickened. The leaflets are severely calcified. There is severely reduced mobility. There is mild to moderate regurgitation. There is at least moderate stenosis. The aortic valve mean gradient is 24 mmHg. The dimensionless velocity index is 0.25. The aortic valve area is 1.44 cm2.   Mitral Valve: There is moderate annular calcification. There is moderate subvalvular calcification. There is moderate to severe regurgitation.   Tricuspid Valve: There is mild to moderate regurgitation. The right ventricular systolic pressure is mildly elevated.   Aorta: The aortic root is mildly dilated. The ascending aorta is mildly dilated.     Procedure: XR chest 1 view portable  Result Date: 5/17/2025  Narrative: XR CHEST PORTABLE INDICATION: STAT intubation. COMPARISON: CXR 4/23/2025, chest CT 5/17/2025. FINDINGS: ET tube 4 cm above the brian. NG tube below the diaphragm. Left axillary dialysis fistula. Mild pulmonary edema. No pneumothorax or pleural effusion. Severe cardiomegaly. Coronary artery stents. Life vest. Bones are unremarkable for age. Normal upper abdomen.     Impression: ET tube 4 cm above the brian Mild pulmonary edema. Workstation performed: [id number]     Procedure: CT chest abdomen pelvis wo contrast  Result Date: 5/17/2025  Narrative: CT CHEST, ABDOMEN AND PELVIS WITHOUT IV CONTRAST INDICATION: Unresponsive. COMPARISON: 4/17/2025 TECHNIQUE: CT examination of the chest, abdomen and pelvis was performed without intravenous contrast. Multiplanar 2D reformatted images were created from the source data. This examination, like all CT scans performed in the [facility], was performed utilizing techniques to minimize radiation dose exposure, including the use of iterative reconstruction and automated exposure control. Radiation dose length product (DLP) for this  visit: 991 mGy-cm. Enteric Contrast: Not administered. FINDINGS: CHEST LUNGS: Central airways are patent. No tracheal or endobronchial lesion. Endotracheal tube tip terminates approximately 6 cm above the brian. Scattered mild interlobular septal thickening and areas of groundglass opacities are seen bilaterally which may reflect interstitial pulmonary edema and asymmetrical alveolar edema. Additional small areas of irregular consolidative opacities are also seen bilaterally which may reflect areas of atelectasis or early airspace disease. Recommend clinical correlation. No suspicious pulmonary parenchymal mass identified. Dependent atelectasis in the bilateral lung bases. PLEURA: Unremarkable without pneumothorax or significant pleural effusions.. HEART/GREAT VESSELS: Heart is enlarged. No pericardial effusion. No thoracic aortic aneurysm or intramural hematoma. Scattered calcific atherosclerosis of the thoracic aorta, proximal thoracic vessels, and coronary arteries. MEDIASTINUM AND LAZARUS: No mediastinal mass or fluid collections. A few nonspecific mildly prominent mediastinal and hilar lymph nodes measuring up to 1.7 x 1.1 cm noted. The visualized thyroid glands appear grossly unremarkable. Esophagus is normal in course and caliber. NG tube noted terminating in the gastric body region. No significant prevertebral soft tissue swelling. CHEST WALL AND LOWER NECK: Subcutaneous air in the bilateral subclavian regions noted of uncertain etiology. No subcutaneous soft tissue chest mass or fluid collections. ABDOMEN LIVER/BILIARY TREE: Cirrhotic morphology. No suspicious mass within study limitations. No biliary dilation. Portal vein is patent. GALLBLADDER: Post cholecystectomy. SPLEEN: Splenomegaly without focal lesions on limited noncontrast evaluation. PANCREAS: Unremarkable. ADRENAL GLANDS: Unremarkable. KIDNEYS/URETERS: Symmetrical renal atrophy and cortical thinning again noted bilaterally which could be related  to chronic renal failure. Extensive calcific atherosclerosis of the renal arteries. No hydronephrosis or perinephric fluid collections.. STOMACH AND BOWEL: Stomach is largely contracted limiting evaluation. NG tube is seen with tip terminating in the gastric body region. Proximal transverse duodenal diverticulum noted without evidence for diverticulitis. Small and large bowel loops appear  normal in course and caliber without evidence for obstruction or inflammation. Terminal ileum and appendix are grossly unremarkable. APPENDIX: No findings to suggest appendicitis. ABDOMINOPELVIC CAVITY: Moderate volume of abdominal/pelvic ascites noted without loculated fluid collection.. No pneumoperitoneum. No lymphadenopathy by size criteria. VESSELS: Extensive calcific atherosclerosis without abdominal aortic aneurysm. Retroaortic left renal vein. PELVIS REPRODUCTIVE ORGANS: Unremarkable for patient's age. URINARY BLADDER: Urinary bladder is decompressed with Bo catheter in place. ABDOMINAL WALL/INGUINAL REGIONS: Unremarkable. BONES: No acute fracture or suspicious osseous lesion. Multilevel degenerative changes of the thoracolumbar spine.     Impression: 1.  Endotracheal tube tip terminates approximately 6 cm above the brian. Scattered mild interlobular septal thickening and areas of groundglass opacities are seen bilaterally which may reflect interstitial pulmonary edema and asymmetrical alveolar edema. Additional small areas of irregular consolidative opacities are also seen bilaterally which may reflect areas of atelectasis or early airspace disease. Recommend clinical correlation. 2.  A few nonspecific mildly prominent mediastinal and hilar lymph nodes measuring up to 1.7 x 1.1 cm noted. 3.  Subcutaneous air in the bilateral subclavian regions noted of uncertain etiology. 4.  Cirrhotic morphology of the liver and splenomegaly noted. 5.  Symmetrical renal atrophy and cortical thinning again noted bilaterally which  could be related to chronic renal failure. No hydronephrosis. 6.  No evidence of bowel obstruction, inflammation, appendicitis, or free air. Moderate volume of abdominal/pelvic ascites noted. 7.  Extensive calcific atherosclerosis. Additional ancillary findings detailed above. Workstation performed: [id number]     Procedure: CT spine cervical without contrast  Result Date: 5/17/2025  Narrative: CT CERVICAL SPINE - WITHOUT CONTRAST INDICATION:   Unresponsive. COMPARISON: Was 17 2025 TECHNIQUE:  CT examination of the cervical spine was performed without intravenous contrast.  Contiguous axial images were obtained. Multiplanar 2D reformatted images were created from the source data. Radiation dose length product (DLP) for this visit:  501 mGy-cm. .  This examination, like all CT scans performed in the [facility], was performed utilizing techniques to minimize radiation dose exposure, including the use of iterative reconstruction and automated exposure control. IMAGE QUALITY:  Diagnostic. FINDINGS: ALIGNMENT:  Normal alignment of the cervical spine. No subluxation. VERTEBRAE:  No acute cervical spine fracture. DEGENERATIVE CHANGES:  Moderate multilevel cervical degenerative changes are again noted, worse at the C3-4 level. No critical central canal stenosis. PREVERTEBRAL AND PARASPINAL SOFT TISSUES: No significant prevertebral soft tissue swelling or hematoma/mass. Endotracheal tube and NG tube partially visualized along their course. THORACIC INLET: Pleural-parenchymal scarring in the dependent lung apices bilaterally, left greater than right. No pneumothorax or significant pleural effusions. Old healed left posterior second rib fracture deformity with callus formation.     Impression: Multilevel degenerative changes worse at the C3-4 level again noted. No acute cervical spine fracture or traumatic malalignment. Workstation performed: [id number]     Procedure: CT head without contrast  Result Date:  5/17/2025  Narrative: CT BRAIN - WITHOUT CONTRAST INDICATION:   Unresponsive. COMPARISON: 4/17/2025 TECHNIQUE:  CT examination of the brain was performed.  Multiplanar 2D reformatted images were created from the source data. Radiation dose length product (DLP) for this visit:  1100 mGy-cm. .  This examination, like all CT scans performed in the [facility], was performed utilizing techniques to minimize radiation dose exposure, including the use of iterative reconstruction and automated exposure control. IMAGE QUALITY:  Diagnostic. FINDINGS: Exam limited by streak artifact emanating from objects superficial to the patient. PARENCHYMA: Decreased attenuation is noted in periventricular and subcortical white matter demonstrating an appearance that is statistically most likely to represent moderate microangiopathic change; this appearance is similar when compared to most recent prior examination. Encephalomalacia in the left occipital lobe, left thalamus, and right periventricular white matter regions are again seen which could represent sequelae of old infarcts. No CT signs of acute infarction.  No intracranial mass, mass effect or midline shift.  No acute parenchymal hemorrhage or extra-axial/subdural hemorrhage. VENTRICLES AND EXTRA-AXIAL SPACES:  Ventricles and extra-axial CSF spaces are prominent commensurate with the degree of volume loss.  No hydrocephalus.  No acute extra-axial hemorrhage. VISUALIZED ORBITS: The bony orbits, globes, and retrobulbar structures are intact. Exophthalmos noted bilaterally, right greater than left.. PARANASAL SINUSES: Mucoperiosteal thickening in the left frontal, bilateral ethmoid, and left inferior maxillary sinuses noted. No air-fluid levels in the paranasal sinuses or right mastoid air cells. Partial opacification of the left inferior mastoid air cells may be secondary to small mastoid effusion. CALVARIUM AND EXTRACRANIAL SOFT TISSUES: Bony calvarium and  temporomandibular joints are intact. Scalp soft tissues are grossly unremarkable. Extensive calcific atherosclerosis noted which may be related to diabetes. Recommend correlation with patient's medical history.     Impression: Exam markedly limited by streak artifact. Stable senescent changes as described above. No gross acute intracranial hemorrhage or depressed calvarial fracture. Additional ancillary findings detailed above. Workstation performed: [id number]     Procedure: VAS ARTERIAL DUPLEX- LOWER LIMB BILATERAL  Result Date: 5/16/2025  Narrative: [facility] REPORT . OMAR ANDERSON is a 65 year old M who was referred by JUSTIN MELENDEZ.    Indications: Patient presents with an ulcer on his right great toe which started few weeks ago. He is ambulating with a wheelchair. Operative History: 07/26/2024 Left Popliteal angioplasty. 09/29/2023 Left Ir lower extremity angiogram w/ intervention. 02/21/2023 Cardiac catheterization. 05/02/2022 Cardiac catheterization. 10/28/2016 N/A Right 3rd Toe Amputation. Left Upper arm AVF Risk Factors: The patient reports hypertension, hyperlipidemia, diabetes, PAD, renal disease, CAD and DVT.   Clinical: Right BP: 125/, Left BP: 0/ FINDINGS: Main                                            PSV (cm/s)  EDV (cm/s)  Pressure   PATSY PATSY                                                                                 Right Post. Tib. (Ankle)  Artery                                     276  2.21    Right Ant.Tib. (Ankle)  Artery                                       279  2.23    Right Metatarsal                                                       0  0.00    Great Toe                                                              0  0.00    Left Post. Tib. (Ankle) Artery                                       197  1.58    Left Ant.Tib. (Ankle)  Artery                                        271  2.17    Left Metatarsal                                                       60  0.48     Great Toe                                                             25  0.20 Right                                                                               Right Common Femoral Artery                     58           0                   Right Profunda Femoris Artery                   50           4                   Prox SFA                                        62           0                   Mid SFA                                         64           4                   Dist SFA                                        86           0                   Proximal Pop                                    64           0                   Distal Pop                                      74          11                   Tibioperoneal                                   34                               Right Distal Posterior Tibial Artery            76           6                   Right Distal Anterior Tibial Artery             61           6                   Right Distal Peroneal Artery                     0           0                Left                                                                                Com Femoral                                     97           0                   Left Profunda Femoris Artery                    96           8                   Prox SFA                                        83           7                   Mid SFA                                         66           5                   Dist SFA                                        61           6                   Proximal Pop                                    45           6                   Distal Pop                                      49           0                   Tibioperoneal                                   67                               Left Distal Posterior Tibial Artery             42           7                   Left Distal Anterior Tibial Artery             105           9                   Left  Distal Peroneal Artery                     37           1                CONCLUSION: RIGHT LOWER LIMB LIMITED: Diffuse disease noted throughout the femoral-popliteal arteries without significant focal stenosis. The posterior tibial and anterior tibial arteries are patent.  Ankle/Brachial index: unreliable to non-compression. Prior: non-compressible The PPG waveform at the 2nd toe is flat-line, suggesting diminished digital perfusion. (Prior 46 mmHg) Great toe pressure was not obtained due to weeping wounds  PVR tracings are dampened.   LEFT LOWER LIMB: Diffuse disease noted throughout the femoral-popliteal arteries without significant focal stenosis. The posterior tibial and anterior tibial arteries are patent.  Ankle/Brachial index: unreliable to non-compression. Prior: non-compressible Metatarsal pressure of 60 mmHg Great toe pressure of 25 mmHg, below healing range for a diabetic (Prior 55 mmHg) PVR/ PPG tracings are dampened.  In comparison to the study of 07/30/2024, the right great toe pressures are now unable to obtain and decrease in the left great toe pressure. SIGNATURE Signed by JUSTIN MELENDEZ MD, RPVI on 2025-05-16 18:58:29 [email or web address]    Procedure: XR Trauma multiple (B/RA trauma bay ONLY)  Result Date: 4/25/2025  Narrative: XR TRAUMA MULTIPLE INDICATION: TRAUMA. COMPARISON: 7/17/2024. FINDINGS: CHEST: Supine frontal view of the chest is obtained. Clear lungs. No evidence of a pneumothorax or pleural effusion. Upright images are more sensitive to detect pneumothoraces if relevant. Diffuse interstitial thickening. Enlarged cardiomediastinal silhouette. No displaced fracture. ABDOMEN: Normal gas bowel gas pattern.     Impression: Pulmonary edema pattern. No acute intrathoracic traumatic abnormality. Normal bowel gas pattern. Computerized Assisted Algorithm (CAA) may have been used to analyze all  applicable images. Workstation performed: [id number]     Procedure: XR chest portable  Result Date: 4/23/2025  Narrative: XR CHEST PORTABLE INDICATION: productive cough with wheezing. COMPARISON: 4/18/2025 FINDINGS: Mild cardiomegaly. Slight increased vascular accentuation. Slight right CP angle blunting. No pneumothorax. Normal cardiomediastinal silhouette. Bones are unremarkable for age. Normal upper abdomen.     Impression: Mild cardiomegaly Slight increase mild vascular congestion. Small right pleural effusion. Workstation performed: [id number]     Procedure: XR chest portable ICU  Result Date: 4/21/2025  Narrative: XR CHEST PORTABLE ICU INDICATION: possibly aspirating. COMPARISON: CXR 4/17/2025, 3/21/2025, chest CT 4/17/2025. FINDINGS: ET tube and NG tube removed. Left subclavian dialysis fistula. Persistent mild pulmonary venous congestion. Nothing to suggest aspiration. No pneumothorax or pleural effusion. Mild cardiomegaly, coronary artery stents. Bones are unremarkable for age. Normal upper abdomen.     Impression: Persistent mild pulmonary venous congestion with nothing to suggest aspiration. Workstation performed: [id number]     Procedure: XR chest portable  Result Date: 4/19/2025  Narrative: XR CHEST PORTABLE INDICATION: concern for pneumonia/lethargy. COMPARISON: CXR 4/18/2025, 4/17/2025. Chest CT 4/17/2025. FINDINGS: Left subclavian dialysis fistula. Mild pulmonary venous congestion. No pneumothorax or pleural effusion. Mild cardiomegaly. Coronary artery stents. Bones are unremarkable for age. Normal upper abdomen.     Impression: Mild pulmonary venous congestion. Workstation performed: [id number]     Procedure: Echo complete w/ contrast if indicated  Result Date: 4/17/2025  Narrative:   Left Ventricle: Left ventricular cavity size is dilated. Wall thickness is normal. There is eccentric hypertrophy. The left ventricular ejection fraction is 20%. Systolic function is severely reduced. There is severe  global hypokinesis. Diastolic function is severely abnormal, consistent with ungraded restrictive relaxation.   Right Ventricle: Right ventricular cavity size is severely dilated. Systolic function is moderately to severely reduced.   Right Atrium: The atrium is dilated.   Aortic Valve: The aortic valve is trileaflet. The leaflets are moderately calcified. There is moderately reduced mobility. There is moderate regurgitation. There is moderate stenosis. The aortic valve mean gradient is 17 mmHg. The dimensionless velocity index is 0.32. The aortic valve area is 1.59 cm2.   Mitral Valve: There is moderate annular calcification. There is moderate regurgitation.   Tricuspid Valve: There is moderate regurgitation. The right ventricular systolic pressure is severely elevated. The estimated right ventricular systolic pressure is 67.00 mmHg.   Aorta: The aortic root is normal in size. The ascending aorta is mildly dilated. The ascending aorta is 4.1 cm.     Procedure: CTA chest pe study  Result Date: 4/17/2025  Narrative: CTA - CHEST WITH IV CONTRAST - PULMONARY ANGIOGRAM INDICATION: Trauma. Shortness of breath COMPARISON: CT chest abdomen and pelvis March 21, 2025, chest x-ray the same day TECHNIQUE: CTA examination of the chest was performed using angiographic technique according to a protocol specifically tailored to evaluate for pulmonary embolism. Multiplanar 2D reformatted images were created from the source data. In addition, coronal  3D MIP postprocessing was performed on the acquisition scanner. Radiation dose length product (DLP) for this visit: 647 mGy-cm . This examination, like all CT scans performed in the [facility], was performed utilizing techniques to minimize radiation dose exposure, including the use of iterative reconstruction and automated exposure control. IV Contrast: 85 mL of iohexol FINDINGS: Mild image degradation due to respiratory motion. PULMONARY ARTERIAL TREE: Study is of  adequate diagnostic quality for assessment of pulmonary embolism. No pulmonary embolus. LUNGS: Endotracheal tube terminates above the brian. Bilateral linear interstitial opacities in the upper lobes suggesting areas of atelectasis. Mild interstitial thickening reflecting early interstitial edema. Dependent atelectasis in the lung bases. PLEURA: Small right pleural effusion. HEART/GREAT VESSELS: Cardiomegaly. Calcifications of the aortic valve. Mitral annulus calcifications. No thoracic aortic aneurysm. Extensive coronary calcifications. MEDIASTINUM AND LAZARUS: Left lower paratracheal node measures 14 mm, smaller than on the previous exam. CHEST WALL AND LOWER NECK: Unremarkable. VISUALIZED STRUCTURES IN THE UPPER ABDOMEN: Feeding tube terminates in the stomach. Enlargement of the inferior vena cava and hepatic veins suggesting right-sided cardiac failure. Abdominal ascites. Nodularity of the hepatic surface which raises concern for underlying cirrhosis with hypertrophy of the left lobe of the liver. Status post cholecystectomy. Extensive vascular calcifications. OSSEOUS STRUCTURES: Spinal degenerative changes are noted. No acute fracture or destructive osseous lesion.     Impression: 1. No pulmonary embolism. 2. Cardiomegaly with small right pleural effusion and interstitial edema. 3. Findings concerning for cirrhosis with ascites. Workstation performed: [id number]     Procedure: XR chest 1 view  Result Date: 4/17/2025  Narrative: XR CHEST 1 VIEW INDICATION: STAT Intubation. COMPARISON: Earlier same day. FINDINGS: Endotracheal tube in satisfactory position with its tip about 4 cm above the level of the brian.  Enteric tube tip extending below the left hemidiaphragm. Enlarged cardiomediastinal silhouette and central pulmonary arteries. Perihilar congestion without tanika interstitial thickening. No discrete consolidation, pleural effusion, or pneumothorax by portable technique. No acute osseous abnormality.      Impression: Lines and tubes as above. Query mild pulmonary edema. Workstation performed: [id number]     Procedure: XR chest 1 view  Result Date: 4/17/2025  Narrative: XR CHEST 1 VIEW INDICATION: line adjustment. COMPARISON: Earlier same day. FINDINGS: Endotracheal tube approximately 3.5 cm above the brian. Enteric tube in the stomach although the tip is not visualized. Enlarged cardiomediastinal silhouette and central pulmonary arteries. Perihilar congestion without tanika interstitial thickening. No discrete consolidation, pleural effusion, or pneumothorax by portable technique.     Impression: Lines and tubes as above. Query mild pulmonary edema. Workstation performed: [id number]     Procedure: TRAUMA - CT abdomen pelvis w contrast  Result Date: 4/17/2025  Narrative: CT ABDOMEN AND PELVIS WITH IV CONTRAST INDICATION: TRAUMA. . COMPARISON: CT chest abdomen pelvis 3/21/2025 TECHNIQUE: CT examination of the abdomen and pelvis was performed. Multiplanar 2D reformatted images were created from the source data. This examination, like all CT scans performed in the [facility], was performed utilizing techniques to minimize radiation dose exposure, including the use of iterative reconstruction and automated exposure control. Radiation dose length product (DLP) for this visit: 861 mGy-cm IV Contrast: 100 mL of iohexol Enteric Contrast: Not administered. FINDINGS: ABDOMEN LOWER CHEST: Coronary artery, aortic, and mitral annular calcifications. Cardiomegaly. Trace right pleural effusion. Mild edema at the lung bases. LIVER/BILIARY TREE: Cirrhotic morphology. No suspicious mass within study limitations. No biliary dilation. Portal vein is patent. GALLBLADDER: Post cholecystectomy. SPLEEN: Splenomegaly measuring 16.9 cm. PANCREAS: Atrophic pancreas. ADRENAL GLANDS: Unremarkable. KIDNEYS/URETERS: Atrophic kidneys without hydronephrosis. STOMACH AND BOWEL: Unremarkable. APPENDIX: Normal. ABDOMINOPELVIC  CAVITY: Small volume ascites. No pneumoperitoneum. No lymphadenopathy. VESSELS: Atherosclerosis without abdominal aortic aneurysm. PELVIS REPRODUCTIVE ORGANS: Unremarkable for patient's age. URINARY BLADDER: Unremarkable. ABDOMINAL WALL/INGUINAL REGIONS: Sacral stimulator in the right lower flank. Mild body wall edema. Small subcutaneous hematoma in the lateral right hip (series 311 image 246) without evidence of active bleeding. BONES: No acute fracture or suspicious osseous lesion.     Impression: 1.  Small subcutaneous hematoma in the right hip without evidence of active bleeding. Otherwise no acute traumatic injury in the abdomen or pelvis. 2.  Cirrhosis with portal hypertension. Small volume ascites. I personally discussed this study with JACOB VARNER on 4/17/2025 2:54 PM. Workstation performed: [id number]     Procedure: XR chest 1 view  Result Date: 4/17/2025  Narrative: XR TRAUMA MULTIPLE INDICATION: TRAUMA. COMPARISON: 7/17/2024. FINDINGS: CHEST: Supine frontal view of the chest is obtained. Clear lungs. No evidence of a pneumothorax or pleural effusion. Upright images are more sensitive to detect pneumothoraces if relevant. Diffuse interstitial thickening. Enlarged cardiomediastinal silhouette. No displaced fracture. ABDOMEN: Normal gas bowel gas pattern.     Impression: Pulmonary edema pattern. No acute intrathoracic traumatic abnormality. Normal bowel gas pattern. Computerized Assisted Algorithm (CAA) may have been used to analyze all applicable images. Workstation performed: [id number]     Procedure: XR pelvis ap only 1 or 2 vw  Result Date: 4/17/2025  Narrative: XR TRAUMA MULTIPLE INDICATION: TRAUMA. COMPARISON: 7/17/2024. FINDINGS: CHEST: Supine frontal view of the chest is obtained. Clear lungs. No evidence of a pneumothorax or pleural effusion. Upright images are more sensitive to detect pneumothoraces if relevant. Diffuse interstitial thickening. Enlarged cardiomediastinal silhouette. No  displaced fracture. ABDOMEN: Normal gas bowel gas pattern.     Impression: Pulmonary edema pattern. No acute intrathoracic traumatic abnormality. Normal bowel gas pattern. Computerized Assisted Algorithm (CAA) may have been used to analyze all applicable images. Workstation performed: [id number]     Procedure: TRAUMA - CT head wo contrast  Result Date: 4/17/2025  Narrative: CT BRAIN - WITHOUT CONTRAST INDICATION:   TRAUMA. COMPARISON: CT head 3/21/2025 TECHNIQUE:  CT examination of the brain was performed.  Multiplanar 2D reformatted images were created from the source data. Radiation dose length product (DLP) for this visit:  1060 mGy-cm .  This examination, like all CT scans performed in the [facility], was performed utilizing techniques to minimize radiation dose exposure, including the use of iterative reconstruction and automated exposure control. IMAGE QUALITY:  Diagnostic. FINDINGS: PARENCHYMA: Decreased attenuation is noted in periventricular and subcortical white matter demonstrating an appearance that is statistically most likely to represent moderate microangiopathic change; this appearance is similar when compared to most recent prior examination. No CT signs of acute infarction.  No intracranial mass, mass effect or midline shift.  No acute parenchymal hemorrhage. VENTRICLES AND EXTRA-AXIAL SPACES:  Ventricles and extra-axial CSF spaces are prominent commensurate with the degree of volume loss.  No hydrocephalus.  No acute extra-axial hemorrhage. VISUALIZED ORBITS: Normal visualized orbits. PARANASAL SINUSES: Moderate mucosal thickening of the visualized paranasal sinuses. CALVARIUM AND EXTRACRANIAL SOFT TISSUES: Normal.     Impression: No acute intracranial abnormality. I personally discussed this study with Saturnino Schwartz on 4/17/2025 2:57 PM. Workstation performed: [id number]     Procedure: TRAUMA - CT facial bones wo contrast  Result Date: 4/17/2025  Narrative: CT FACIAL BONES  WITHOUT INTRAVENOUS CONTRAST INDICATION:   TRAUMA. COMPARISON: CT head 3/21/2025 TECHNIQUE:  Axial CT images were obtained through the facial bones with additional sagittal and coronal reconstructions. Radiation dose length product (DLP) for this visit:  405 mGy-cm .  This examination, like all CT scans performed in the [facility], was performed utilizing techniques to minimize radiation dose exposure, including the use of iterative reconstruction and automated exposure control. IMAGE QUALITY:  Diagnostic. FINDINGS: FACIAL BONES:   No facial bone fracture identified.  Normal alignment of the temporomandibular joints.  No lytic or blastic lesion. ORBITS:  Orbital globes, optic nerves, and extraocular muscles appear symmetric and normal. There is no evidence of retrobulbar mass, abscess, or hematoma. SINUSES: Partial opacification of the left maxillary sinus and mild mucosal thickening in the left sphenoid and frontal sinuses and right maxillary sinus. SOFT TISSUES: With nasal soft tissue swelling.     Impression: No fractures. I personally discussed this study with Saturnino Schwartz on 4/17/2025 2:57 PM. Workstation performed: [id number]     Procedure: TRAUMA - CT spine cervical wo contrast  Result Date: 4/17/2025  Narrative: CT CERVICAL SPINE - WITHOUT CONTRAST INDICATION:   TRAUMA. COMPARISON: CT chest abdomen and pelvis 3/21/2025 TECHNIQUE:  CT examination of the cervical spine was performed without intravenous contrast.  Contiguous axial images were obtained. Multiplanar 2D reformatted images were created from the source data. Radiation dose length product (DLP) for this visit:  421 mGy-cm .  This examination, like all CT scans performed in the [facility], was performed utilizing techniques to minimize radiation dose exposure, including the use of iterative reconstruction and automated exposure control. IMAGE QUALITY: Somewhat limited by motion particularly of the upper cervical spine.  FINDINGS: ALIGNMENT:  Normal alignment of the cervical spine. No subluxation. VERTEBRAE:  No fracture. DEGENERATIVE CHANGES:  Moderate multilevel cervical degenerative changes are noted. No critical central canal stenosis. PREVERTEBRAL AND PARASPINAL SOFT TISSUES: Unremarkable THORACIC INLET:, Unchanged nondisplaced left posterior second rib fracture.     Impression: No cervical spine fracture or traumatic malalignment. I personally discussed this study with Saturnino Schwartz on 4/17/2025 2:57 PM. Workstation performed: [id number]     Procedure: XR chest pa and lateral  Result Date: 4/17/2025  Narrative: XR CHEST PA AND LATERAL INDICATION: R05.1: Acute cough. Fatigue, cough, history of CHF. COMPARISON: CXR 3/21/2025, 3/17/2025, 12/22/2024, chest CT 3/1/2025. FINDINGS: Left axillary dialysis fistula. Mild pulmonary venous congestion. Trace pleural effusions. Moderate cardiomegaly. Coronary artery stents. Bones are unremarkable for age.Extensive vascular soft tissue calcification. Normal upper abdomen.     Impression: Mild pulmonary venous congestion with trace pleural effusions. Workstation performed: [id number]     Procedure: XR finger left fifth digit-pinkie  Result Date: 4/10/2025  Narrative: LEFT FINGER INDICATION:   Unspecified open wound of left upper arm, sequela. COMPARISON:  None. VIEWS:  XR FINGER LEFT FIFTH DIGIT-PINKIE For the purposes of institution wide universal language the following terms will apply: (thumb=1st digit/finger, index finger=2nd digit/finger, long finger=3rd digit/finger, ring=4th digit/finger and small finger=5th digit/finger) FINDINGS: There is no acute displaced fracture or dislocation. Minimal degenerative changes left hand and wrist. Extensive arterial calcifications. No lytic or blastic osseous lesion. Soft tissues are unremarkable.     Impression: No acute osseous abnormality. Electronically signed: 04/10/2025 07:43 PM Willard Philippe MD    Procedure: VAS upper limb venous duplex scan,  complete, bilateral  Result Date: 3/30/2025  Narrative:  [facility] REPORT CLINICAL: Indications: Patient presents to determine propagation vs resolution of previously noted DVT in the left IJV s/p catheter removal discovered on 3/12/2025 at an outside facility. 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 Jugular            Acute     Ceph AC       Acute                  CONCLUSION:  Impression  RIGHT UPPER LIMB: No evidence of acute or chronic deep vein thrombosis. There is evidence of acute non-occlusive superficial thrombophlebitis in the cephalic vein at the elbow.  LEFT UPPER LIMB: There is evidence of acute non-occlusive well attached deep vein thrombosis in the internal jugular vein. No evidence of superficial thrombophlebitis noted. The AVF appears patent and aneurysmal measuring 2.5 cm in the mid upper arm and 3.0 cm in the distal upper arm.  In comparison to the study of 3/12/2025 at an outside facility, there is no significant change on the left and there is superficial thrombus noted in the right cephalic vein at the elbow.  SIGNATURE: Electronically Signed by: NAYELI SNOW on 2025-03-30 02:43:43 PM    Procedure: MRI brain wo contrast  Result Date: 3/23/2025  Narrative: MRI BRAIN WITHOUT CONTRAST INDICATION: stroke.   Acute encephalopathy. COMPARISON: CT head 3/21/2025, 12/15/2024, 7/17/202. MRI brain 4/20/2022 TECHNIQUE:  Multiplanar, multisequence imaging of the brain was performed. Imaging performed on 1.5T MRI IMAGE QUALITY: Study is degraded by motion artifact, limiting evaluation. FINDINGS: BRAIN PARENCHYMA:  There is no  discrete mass, mass effect or midline shift. There is no intracranial hemorrhage. Small chronic left thalamic, right frontal lobe and left posterior occipital lobe infarcts with encephalomalacia and mild gliosis. Redemonstrated chronic infarcts within the left occipital lobe and left thalamus. Visualization of other previously mentioned chronic infarcts is limited secondary to motion artifact. No definite evidence of chronic infarct within the right hemipons as questioned on CT 03/21/2025. There is no evidence of acute infarction and diffusion imaging is unremarkable. Small scattered hyperintensities on T2/FLAIR imaging are noted in the periventricular and subcortical white matter demonstrating an appearance that is statistically most likely to represent mild microangiopathic change. VENTRICLES:  Ventricles and extra-axial CSF spaces are prominent commensurate with the degree of volume loss.  No hydrocephalus.  No acute intraventricular hemorrhage. SELLA AND PITUITARY GLAND:  Normal. ORBITS:  Normal. PARANASAL SINUSES: Partial opacification of the left maxillary sinus. VASCULATURE:  Evaluation of the major intracranial vasculature demonstrates appropriate flow voids. CALVARIUM AND SKULL BASE:  Normal. EXTRACRANIAL SOFT TISSUES:  Normal.     Impression: Motion degraded examination. No acute infarction, edema, or mass effect. Small chronic left thalamic, right frontal lobe, and left posterior occipital lobe infarcts with encephalomalacia and mild gliosis. No definite evidence of chronic infarct within the right hemipons as questioned on CT 03/21/2024. Mild chronic microangiopathic ischemic changes. Resident: GALA TRAN I, the attending radiologist, have reviewed the images and agree with the final report above. Workstation performed: [id number]     Procedure: XR chest portable  Result Date: 3/21/2025  Narrative: XR CHEST PORTABLE INDICATION: ams. COMPARISON: 1 view chest 3/17/2025 FINDINGS: Distended pulmonary  vasculature. No pneumothorax or pleural effusion. No infiltrate. Cardiac silhouette is enlarged.  Aortic calcification is present. Coronary artery stents. Left upper chest wall vascular graft. Bones are unremarkable for age. Normal upper abdomen.     Impression: Mild pulmonary vascular congestion. Workstation performed: [id number]     Procedure: CT chest abdomen pelvis wo contrast  Result Date: 3/21/2025  Narrative: CT CHEST, ABDOMEN AND PELVIS WITHOUT IV CONTRAST INDICATION: ams. COMPARISON: CT abdomen/pelvis 5/23/2022 TECHNIQUE: CT examination of the chest, abdomen and pelvis was performed without intravenous contrast. Multiplanar 2D reformatted images were created from the source data. This examination, like all CT scans performed in the [facility], was performed utilizing techniques to minimize radiation dose exposure, including the use of iterative reconstruction and automated exposure control. Radiation dose length product (DLP) for this visit: 1417 mGy-cm Enteric Contrast: Not administered. FINDINGS: CHEST LUNGS: Central airways are patent. No large consolidative opacity. No suspicious pulmonary nodules. PLEURA: Small right pleural effusion HEART/GREAT VESSELS: Mild cardiomegaly. Severe coronary artery calcifications. No thoracic aortic aneurysm. MEDIASTINUM AND LAZARUS: Multiple prominent mediastinal lymph nodes, largest is a 1.9 cm AP window lymph node (series 5, image 45). These are nonspecific and may be reactive. Consider follow-up chest CT after resolution of patient's current symptoms. CHEST WALL AND LOWER NECK: Chronic right-sided rib fractures. Anasarca. ABDOMEN Note that assessment abdominal pelvis lateral manage is limited due to the absence of IV contrast. LIVER/BILIARY TREE: The liver has a somewhat nodular contour. No discrete liver lesion is visualized. There is trace perihepatic fluid. GALLBLADDER: Surgically absent SPLEEN: Splenomegaly. Small amount of perisplenic fluid. Small  splenules adjacent to the spleen. PANCREAS: No peripancreatic fat stranding to suggest pancreatitis. ADRENAL GLANDS: Bilateral adrenal glands are normal in appearance. KIDNEYS/URETERS: Atrophic kidneys, in keeping with patient's history of chronic kidney disease. No evidence of hydronephrosis or nephrolithiasis. STOMACH AND BOWEL: No evidence of bowel obstruction. No diverticulitis. APPENDIX: No findings to suggest appendicitis. ABDOMINOPELVIC CAVITY: No ascites. No pneumoperitoneum. No lymphadenopathy. Nerve stimulator pump over the right lower back VESSELS: Atherosclerotic calcification of the abdominal and pelvic vasculature. Retroaortic left renal vein PELVIS REPRODUCTIVE ORGANS: Unremarkable for patient's age. URINARY BLADDER: Unremarkable. ABDOMINAL WALL/INGUINAL REGIONS: Unremarkable. BONES: No acute fracture or suspicious osseous lesion.     Impression: 1.  No acute abnormality in the chest, abdomen, or pelvis 2.  Multiple prominent mediastinal lymph nodes, which are nonspecific and may be reactive. Consider follow-up chest CT after resolution of patient's current symptoms. 3.  Likely hepatic cirrhosis with findings of portal hypertension, including splenomegaly and ascites. Workstation performed: [id number]     Procedure: CT head wo contrast  Result Date: 3/21/2025  Narrative: CT BRAIN - WITHOUT CONTRAST INDICATION:   ams. COMPARISON:  Head CT 12/15/24 TECHNIQUE:  CT examination of the brain was performed.  Multiplanar 2D reformatted images were created from the source data. Radiation dose length product (DLP) for this visit:  914 mGy-cm .  This examination, like all CT scans performed in the [facility], was performed utilizing techniques to minimize radiation dose exposure, including the use of iterative reconstruction and automated exposure control. IMAGE QUALITY:  Diagnostic. FINDINGS: PARENCHYMA: No hemorrhage. No hydrocephalus. No extra-axial fluid collection. No mass effect. No mass  lesion. 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 There is also a chronic right cerebellar infarct. There are dense calcifications in the V4 segment of the bilateral vertebral arteries and the supraclinoid ICAs bilaterally. Compared to prior exam there is a new hypodensity in the right hemipons (series 2, image 14) with sparing of the periphery. Although this may be artifactual, there is clinical concern for acute infarct, further evaluation with a brain MRI is recommended VENTRICLES AND EXTRA-AXIAL SPACES:  Normal for the patient's age. VISUALIZED ORBITS: Normal visualized orbits. PARANASAL SINUSES: Polypoid mucosal thickening in the left maxillary sinus CALVARIUM AND EXTRACRANIAL SOFT TISSUES: Normal.     Impression: Compared to prior exam there is a new hypodensity in the right hemipons with sparing of the periphery. Although this may be artifactual, if there is clinical concern for an acute infarct, further evaluation with a brain MRI is recommended. Workstation performed: [id number]     Procedure: X-ray chest 1 view portable  Result Date: 3/17/2025  Narrative: XR CHEST PORTABLE INDICATION: chest pain. COMPARISON: 12/22/2024 FINDINGS: Monitoring leads and clips project over the chest. Vascular congestion. No pneumothorax or pleural effusion. Cardiomegaly. Coronary stents again noted.. Bones are unremarkable for age. Normal upper abdomen.     Impression: Vascular congestion. Workstation performed: [id number]     Procedure: XR chest portable  Result Date: 3/12/2025  Narrative: EXAMINATION: CHEST RADIOGRAPH CLINICAL INFORMATION: I36.1  Nonrheumatic tricuspid (valve) insufficiency.  R11.10  Vomiting, unspecified.   ASPIRATION PNEUMONIA TECHNIQUE: Portable AP view of the chest COMPARISON: 03/10/2025 FINDINGS: See Impression.    Impression: Life support and monitoring devices: None. Lungs and pleura: Mildly increased pulmonary edema and patchy right infrahilar opacity.  No pneumothorax. Heart, mediastinum and irma: Enlarged, stable accounting for projection and technique. Electronically Signed By: Nahum Adrian on March 12, 2025 07:14 PM GUERLINE, Nahum Adrian, have personally reviewed this report and concur with its findings and conclusions, as affirmed by my electronic signature.    Procedure: US scrotum and testicles  Result Date: 3/12/2025  Narrative: EXAMINATION: SCROTAL SONOGRAM CLINICAL INFORMATION: eval for testicular torsion or inflammation  R11.10  Vomiting, unspecified. TECHNIQUE: Doppler and grayscale imaging was performed. COMPARISON: None available. FINDINGS: RIGHT: Right testicle: The right testicle measures 4.1 x 2.7 x 3.3 cm/19 mL. The echogenicity is normal. Vascularity is normal and is symmetrical compared with the left. Right epididymis: The right epididymis is heterogeneous with small echogenic foci. Additional observations: Small hydrocele with debris. There is no varicocele. LEFT: Left testicle: The left testicle measures 4.2 x 2.0 x 1.4 cm/12.2 mL. The echogenicity is normal. Vascularity is normal and is symmetrical compared with the right. Left epididymis: The left epididymis is heterogeneous with small echogenic foci. Partially visualized left epididymal head cyst with septations. Additional observations: Small hydrocele. There is no varicocele.    Impression: 1. No evidence for acute testicular torsion or epididymo-orchitis. 2. Heterogeneous epididymis bilaterally, which may be on the basis of prior infection. 3. Small bilateral hydroceles with debris. Electronically Signed By: Yamileth Allen on March 12, 2025 09:46 AM GUERLINE, Yamileth Allen, have personally reviewed this report and concur with its findings and conclusions, as affirmed by my electronic signature.    Procedure: VAS upper limb vein mapping  Result Date: 3/12/2025  Narrative: EXAMINATION: RIGHT UPPER EXTREMITY VENOUS DOPPLER ULTRASOUND CLINICAL INFORMATION: forearm swelling, eval for DVT   I34.0  Nonrheumatic mitral (valve) insufficiency. TECHNIQUE: Grayscale, color Doppler and spectral Doppler images of the right upper extremity veins were obtained. COMPARISON: None available. FINDINGS: Right upper extremity: There is normal color filling, compression and respiratory variation in the jugular vein. There is normal color filling and respiratory variation in the innominate vein, subclavian vein and axillary vein. There is normal brachial vein color filling and compression. Nonocclusive thrombus in the left mid and inferior internal jugular vein.    Impression: Positive for deep venous thrombosis. Nonocclusive thrombus in the left mid and inferior internal jugular vein. No right upper extremity deep venous thrombosis. The critical finding of acute deep venous thrombosis was communicated to RHETT Mc  on 03/12/2025 at 2 a.m. with read back verification obtained 5 minutes after identification. Electronically Signed By: Kourtney Robles on March 12, 2025 08:50 AM IKourtney, have personally reviewed this report and concur with its findings and conclusions, as affirmed by my electronic signature.    Procedure: XR abdomen 1 vw portable  Result Date: 3/11/2025  Narrative: EXAMINATION: RADIOGRAPH OF ABDOMEN - PORTABLE EXAM CLINICAL INFORMATION: VOMITING  R11.10  Vomiting, unspecified. TECHNIQUE: Portable AP view COMPARISON: 03/04/2025 FINDINGS: Spinal stimulator generator over the right lower quadrant with lead overlying the right nancy sacrum. Increased small bowel dilatation at the right lower quadrant. Extensive vascular calcification. Cholecystectomy clips. Moderate degenerative changes of the spine. Air and stool are seen within the colon.    Impression: Increased small bowel dilatation at the right lower quadrant. Electronically Signed By: Narinder Orr on March 11, 2025 12:40 PM INarinder, have personally reviewed this report and concur with its findings and conclusions,  as affirmed by my electronic signature.    Procedure: XR chest portable  Result Date: 3/10/2025  Narrative: EXAMINATION: CHEST RADIOGRAPH CLINICAL INFORMATION: N18.6  End stage renal disease.  Z99.2  Dependence on renal dialysis.   Central line assessment TECHNIQUE: Portable AP view of the chest COMPARISON: Chest radiograph 03/09/2025 FINDINGS: See Impression.    Impression: Life support and monitoring devices: Left IJ approach central venous catheter with tip in the SVC. Lungs and pleura: Unchanged patchy bilateral opacities, which could represent sequelae of infectious bronchiolitis and/or edema/atelectasis. No pneumothorax. No focal consolidation to suggest pneumonia. Heart, mediastinum and irma: Stable cardiomegaly. Electronically Signed By: Nico Fine on March 10, 2025 08:38 AM INico, have personally reviewed this report and concur with its findings and conclusions, as affirmed by my electronic signature.    Procedure: XR chest portable  Result Date: 3/9/2025  Narrative: EXAMINATION: CHEST RADIOGRAPH CLINICAL INFORMATION: I36.1  Nonrheumatic tricuspid (valve) insufficiency.   lines TECHNIQUE: Portable AP view of the chest COMPARISON: Chest radiograph 03/08/2025. FINDINGS: See Impression.    Impression: Life support and monitoring devices: Stable left IJ approach central venous catheter. Vascular stent projects over the left scapula. Lungs and pleura: Stable mild pulmonary edema. No focal consolidation, large pleural effusion or pneumothorax. Heart, mediastinum and irma: Stable cardiomediastinal silhouette accounting for projection and technique. Electronically Signed By: Naveed Viramontes on March 09, 2025 01:24 PM Naveed CUNHA, have personally reviewed this report and concur with its findings and conclusions, as affirmed by my electronic signature.    Procedure: XR chest portable  Result Date: 3/8/2025  Narrative: EXAMINATION: CHEST RADIOGRAPH CLINICAL INFORMATION: N18.6  End stage renal disease.   Z99.2  Dependence on renal dialysis.   END STAGE RENAL DISEASE TECHNIQUE: Portable AP view of the chest COMPARISON: 03/07/2025 FINDINGS: See Impression.    Impression: Life support and monitoring devices: There is a left subclavian stent. There is a left IJ central venous catheter with tip in the SVC. Lungs and pleura: Unchanged mild interstitial opacities may represent pulmonary vascular congestion. Left basilar retrocardiac opacity may represent atelectasis versus pneumonia. No pleural effusion, no pneumothorax. Heart, mediastinum and irma: Stable accounting for projection and technique. Electronically Signed By: Karen Bone on March 08, 2025 11:17 AM IKaren, have personally reviewed this report and concur with its findings and conclusions, as affirmed by my electronic signature.    Procedure: XR chest portable  Result Date: 3/7/2025  Narrative: EXAMINATION: CHEST RADIOGRAPH CLINICAL INFORMATION: I34.0  Nonrheumatic mitral (valve) insufficiency.   HYPOXIA TECHNIQUE: Portable AP view of the chest COMPARISON: Chest radiograph 03/06/2025 FINDINGS: See Impression.    Impression: Life support and monitoring devices: Left IJ approach central venous catheter with tip in the SVC. Lungs and pleura: Unchanged patchy bilateral opacities, which could represent sequelae of infectious bronchiolitis and/or edema/atelectasis. No pneumothorax. No focal consolidation to suggest pneumonia. Heart, mediastinum and irma: Stable cardiomegaly. Electronically Signed By: Nico Fine on March 07, 2025 06:55 AM INico, have personally reviewed this report and concur with its findings and conclusions, as affirmed by my electronic signature.    Procedure: XR chest portable  Result Date: 3/6/2025  Narrative: EXAMINATION: CHEST RADIOGRAPH CLINICAL INFORMATION: R06.02  Shortness of breath.   Central line assessment TECHNIQUE: Portable AP view of the chest COMPARISON: 03/05/2025 FINDINGS: See Impression.    Impression: Life  support and monitoring devices: Unchanged positioning of a left IJ central venous catheter. Vascular stent projects over the left scapula. Lungs and pleura: Pulmonary venous congestion. No large pleural effusion, consolidation, or pneumothorax. Heart, mediastinum and irma: Stable cardiomediastinal silhouette accounting for projection and technique. Electronically Signed By: Naveed Viramontes on March 06, 2025 07:23 AM Naveed CUNHA, have personally reviewed this report and concur with its findings and conclusions, as affirmed by my electronic signature.    Procedure: XR chest portable  Result Date: 3/5/2025  Narrative: EXAMINATION: CHEST RADIOGRAPH CLINICAL INFORMATION: Central venous catheter evaluation TECHNIQUE: Portable AP view of the chest COMPARISON: 03/04/2025 FINDINGS: See Impression.    Impression: Life support and monitoring devices: Unchanged positioning of a left IJ central venous catheter. Vascular stent projects over the left scapula. Lungs and pleura: Stable mild pulmonary edema. No large pleural effusion, consolidation, or pneumothorax. Heart, mediastinum and irma: Stable cardiomediastinal silhouette accounting for projection and technique. Electronically Signed By: Naveed Viramontes on March 05, 2025 08:18 AM Naveed CUNHA, have personally reviewed this report and concur with its findings and conclusions, as affirmed by my electronic signature.    Procedure: XR abdomen 1 vw portable  Result Date: 3/4/2025  Narrative: EXAMINATION: RADIOGRAPH OF ABDOMEN - PORTABLE EXAM CLINICAL INFORMATION: ABDOMINAL DISTENTION  K59.00  Constipation, unspecified. TECHNIQUE: Portable AP view COMPARISON: Abdominal radiograph 03/02/2025 FINDINGS: Nonobstructive bowel gas pattern    Impression: Nonobstructive bowel gas pattern. Electronically Signed By: Nico Fine on March 04, 2025 05:07 PM INico, have personally reviewed this report and concur with its findings and conclusions, as affirmed by my electronic  signature.    Procedure: XR chest portable  Result Date: 3/4/2025  Narrative: EXAMINATION: CHEST RADIOGRAPH CLINICAL INFORMATION: I25.10  Atherosclerotic heart disease of native coronary artery without angina pectoris.   heart failure TECHNIQUE: Portable AP view of the chest COMPARISON: Chest radiograph 03/03/2025 FINDINGS: See Impression.    Impression: Life support and monitoring devices: Left IJ approach central venous catheter with tip in the SVC. Lungs and pleura: Unchanged patchy bilateral opacities, which could represent sequelae of infectious bronchiolitis and/or edema/atelectasis. No pneumothorax. No focal consolidation to suggest pneumonia. Heart, mediastinum and irma: Stable. Electronically Signed By: Nico Fine on March 04, 2025 09:47 AM I, Nico Fine, have personally reviewed this report and concur with its findings and conclusions, as affirmed by my electronic signature.    Procedure: XR abdomen 1 vw portable  Result Date: 3/3/2025  Narrative: EXAMINATION: RADIOGRAPH OF ABDOMEN - PORTABLE EXAM CLINICAL INFORMATION: OBSTIPATION  K59.00  Constipation, unspecified. TECHNIQUE: Portable AP view COMPARISON: Abdominal radiograph 03/02/2025.  CTA abdomen pelvis 02/27/2025. FINDINGS: Mildly distended air-filled stomach. Scattered normal caliber air-filled loops of small and large bowel. Moderate colonic stool burden. Spinal stimulator projects over the sacrum. Extensive circumferentially calcified vessels in the abdomen and pelvis.    Impression: Moderate colonic stool burden. Nonobstructive bowel gas pattern. Electronically Signed By: Torres Linares on March 03, 2025 11:09 AM I, Torres Linares, have personally reviewed this report and concur with its findings and conclusions, as affirmed by my electronic signature.    Procedure: XR chest portable  Result Date: 3/3/2025  Narrative: EXAMINATION: CHEST RADIOGRAPH CLINICAL INFORMATION: Chest pain TECHNIQUE: Portable AP view of the chest COMPARISON: 03/02/2025  FINDINGS: See Impression.    Impression: Life support and monitoring devices: Unchanged positioning of a left IJ central venous catheter. Vascular stent projects over the left scapula. Lungs and pleura: Stable mild pulmonary edema. New hazy airspace opacity within the left lower lobe may represent atelectasis; pneumonia should be excluded. No large pleural effusion or pneumothorax. Heart, mediastinum and irma: Stable enlarged cardiomediastinal silhouette accounting for projection and technique. Electronically Signed By: Naveed Viramontes on March 03, 2025 08:08 AM INaveed, have personally reviewed this report and concur with its findings and conclusions, as affirmed by my electronic signature.    Procedure: XR chest portable  Result Date: 3/3/2025  Narrative: EXAMINATION: CHEST RADIOGRAPH CLINICAL INFORMATION: Abdominal pain TECHNIQUE: Portable AP view of the chest COMPARISON: 03/02/2025 at 7:37 a.m. FINDINGS: See Impression.    Impression: Life support and monitoring devices: Unchanged positioning of a left IJ central venous catheter. Vascular stent projects over the left scapula. Lungs and pleura: Stable mild pulmonary edema. No large pleural effusion, consolidation, or pneumothorax. Heart, mediastinum and irma: Stable enlarged cardiomediastinal silhouette accounting for projection and technique. Electronically Signed By: Naveed Viramontes on March 03, 2025 07:29 AM Naveed CUNHA, have personally reviewed this report and concur with its findings and conclusions, as affirmed by my electronic signature.    Procedure: US Soft Tissue - Head or Neck Portable  Result Date: 3/2/2025  Narrative: EXAMINATION: ULTRASOUND OF SOFT TISSUE OF THE NECK CLINICAL INFORMATION: HEMATOMA R neck hematoma T14.8XXA  Other injury of unspecified body region, initial encounter. TECHNIQUE: Doppler and grayscale imaging was performed. Targeted imaging of the neck was performed. COMPARISON: None available. FINDINGS: In the clinical area of concern in  the right neck there is a complex hypoechoic collection measuring 3.3 x 1.8 x 3.5 cm without appreciable internal vascularity.    Impression: 3.5 cm hematoma in the right neck. No evidence of pseudoaneurysm.. Electronically Signed By: Nahum Adrian on March 02, 2025 12:48 PM INahum, have personally reviewed this report and concur with its findings and conclusions, as affirmed by my electronic signature.    Procedure: XR abdomen 1 vw portable  Result Date: 3/2/2025  Narrative: EXAMINATION: RADIOGRAPH OF ABDOMEN - PORTABLE EXAM CLINICAL INFORMATION: ABDOMINAL DISTENTION VOMITING R10.84  Generalized abdominal pain. TECHNIQUE: Portable AP view COMPARISON: CT abdomen and pelvis 02/27/2025. FINDINGS: The bowel gas pattern is nonobstructive. A large stool burden is seen in the right colon. Spinal stimulator leads project over the sacrum. Diffuse small vessel atherosclerotic calcification. Cardiomegaly.    Impression: Nonobstructive bowel gas pattern. Electronically Signed By: Nahum Adrian on March 02, 2025 11:34 AM INahum, have personally reviewed this report and concur with its findings and conclusions, as affirmed by my electronic signature.    Procedure: XR chest portable  Result Date: 3/2/2025  Narrative: EXAMINATION: CHEST RADIOGRAPH CLINICAL INFORMATION: Hypoxia N18.6  End stage renal disease.  I21.4  Non-ST elevation (NSTEMI) myocardial infarction. TECHNIQUE: Portable AP view of the chest COMPARISON: Chest radiograph 3/1/2025 FINDINGS: See Impression.    Impression: Life support and monitoring devices: Stable left IJ central venous catheter. Lungs and pleura: Decreased mild pulmonary edema. No new focal consolidation. No pneumothorax. Heart, mediastinum and iram: Stable accounting for projection and technique. Electronically Signed By: Karen Bone on March 02, 2025 11:25 AM Karen CUNHA, have personally reviewed this report and concur with its findings and  conclusions, as affirmed by my electronic signature.    Procedure: US Venous Lower Extremity Portable - Bilateral  Result Date: 3/2/2025  Narrative: EXAMINATION: BILATERAL LOWER EXTREMITY VENOUS DOPPLER ULTRASOUND CLINICAL INFORMATION: SWELLING  R57.9  Shock, unspecified. TECHNIQUE: Color Doppler and spectral Doppler and grayscale evaluation of bilateral common femoral, femoral and popliteal veins, proximal greater saphenous and proximal deep femoral veins was performed. COMPARISON:  None available. FINDINGS: RIGHT LEG: There is no evidence of deep venous thrombosis based on compression, color mapping, respiratory variation and augmentation. The common femoral waveforms are symmetric. LEFT LEG: There is no evidence of deep venous thrombosis based on compression, color mapping, respiratory variation and augmentation. The common femoral waveforms are symmetric.    Impression: Negative for deep venous thrombosis. Electronically Signed By: Nahum Adrian on March 02, 2025 11:02 AM I, Nahum Adrian, have personally reviewed this report and concur with its findings and conclusions, as affirmed by my electronic signature.    Procedure: Transthoracic Echo (TTE)  Result Date: 3/1/2025  Narrative:            Echocardiography Report       [facility]     [facility]             [address], [address] [address] Inpatient - (T) [phone] (F) [phone] Outpatient - (T) [phone] (F) [phone]         Outpatient - (T) [phone]                 [facility] Accredited Patient Name: OMAR ANDERSON              Patient ID: [id number] Exam Date: 3/1/2025 Exam Time:12:14:39   Patient AGE: 65 years PM Date of Report: 3/1/2025                 Height: 69 in Weight:214 lb Referring Phys: MANUEL MUNOZ              Gender:M BSA:2.13 m² Study Quality: Moderately limited.       Study Type: Cardiac Exam Location:  Portable.                 Procedure Code: Complete Echo IP                                          [id number]. Patient Location: Inpatient.             Acc Number: [id number] Diagnosis: Nonrheumatic aortic stenosis  Tech: JR I35.0                  Study Measurements 2D: Normal Female: Normal Male: LVED (d):                6.6 cm           3.8-5.2 cm   4.2-5.8 cm LVES (s):                                 2.2-3.5 cm   2.5-4.0 cm IVS (d):                 1.1 cm           0.6-0.9 cm   0.6-1.0 cm LVPW (d):                1.0 cm           0.6-0.9 cm   0.6-1.0 cm Ao Root (d):             3.9 cm           2.4-3.6 cm   2.8-4.0 cm LA Diam (s):                              2.7-3.8 cm   2.7-4.0 cm LA Volume index:      54.4 ml/m²         16-34 ml/m2   16-34 ml/m2 LV EF% (calc):                              54-74%     52-72% LV EF% (est):          35 to 40%            54-74%     52-72% Patient Vitals: The resting blood pressure was 114/44 mmHg. The heart rate was 61 bpm. Cardiac Chambers: Left Ventricle: LV size is moderately increased. LV thickness is normal. The pattern of hypertrophy is eccentric. Left ventricular systolic function is moderately decreased. Left ventricular ejection fraction is 35 to 40%. There is grade III diastolic dysfunction (grade III of III); filling pressures are elevated. There is global hypokinesis with regional variations. The LV mass index is 150.3 g/m². Right Ventricle: Right ventricular size is mildly increased. The right ventricular systolic function is moderately reduced. Left Atrium: There is severe left atrial enlargement. The LA Volume is 115.6 ml. Left atrium volume index is 54.4 ml/m². Right Atrium: Right atrial size is normal. Right atrium volume index is 30.7 ml/m2. Cardiac Valves: Regurgitation from all valves was evaluated by color flow Doppler. Aortic Valve: Moderately to severely calcified and restricted. There is moderate to severe aortic valve stenosis. The aortic valve peak  gradient is 31.9 mmHg and the mean gradient is 16.7 mmHg. The aortic valve peak velocity is 2.8 m/s. No aortic insufficiency is present. The LVOT diameter is 26.2 mm. The LVOT VTI is 17.4 cm. The aortic valve VTI is 68.4 cm. The aortic valve area by the continuity equation measures 1.4 cm². The aortic valve area index is 0.6 cm²/m². The dimensionless index is 0.25. By dimensionless index and indexed FATUMA, AS classifies as severe. Mitral Valve: Moderate thickening of the mitral valve leaflets. There is moderate mitral annular calcification noted on the posterior annulus. The peak and mean transvalvular gradients are 8.6 mmHg and 3.0 mmHg, respectively. Mitral valve gradients were measured at a heart rate of 64 bpm. Mild to moderate mitral regurgitation is present. Tricuspid Valve: The tricuspid valve is structurally normal. Mild to moderate tricuspid regurgitation is present. Pulmonic Valve: The structure of the pulmonic valve is normal. Trace pulmonary regurgitation is present. Other: Pulmonary Artery Pressure: The pulmonary artery systolic pressure is at least 44 mmHg plus the RA pressure. Aorta: The aortic root is mildly dilated at the Sinuses of Valsalva. Ascending aorta is not well visualized. The transverse aorta is not well seen. Pericardium: No pericardial effusion. IVC and Hepatic Veins: The inferior vena cava is more than 21 mm in diameter and collapses < 50% with inspiration suggestive of right atrial pressure of 15 mmHg. Conclusions: 1. LV size is moderately increased. LV thickness is normal. The pattern of hypertrophy is eccentric. Left ventricular systolic function is moderately decreased. There is grade III diastolic dysfunction (grade III of III); filling pressures are elevated. 2. There is severe left atrial enlargement. Right atrial size is normal. 3. Right ventricular size is mildly increased. The right ventricular systolic function is moderately reduced. 4. Moderately to severely calcified and  restricted. There is moderate to severe aortic valve stenosis. No aortic insufficiency is present. The aortic root is mildly dilated at the Sinuses of Valsalva. Ascending aorta is not well visualized. The transverse aorta is not well seen. 5. Moderate thickening of the mitral valve leaflets. Mild to moderate mitral regurgitation is present. 6. The tricuspid valve is structurally normal. Mild to moderate tricuspid regurgitation is present. The structure of the pulmonic valve is normal. Trace pulmonary regurgitation is present. 7. No pericardial effusion. 8. The pulmonary artery systolic pressure is at least 44 mmHg plus the RA pressure. Clinical Correlation/Compared to previous: Compared to previous study on 1/21/2025, there is no significant change. Electronically signed by : Tamika Ríos MD on: 3/1/2025 at: 2:57:44 PM   Final      Procedure: XR chest portable  Result Date: 3/1/2025  Narrative: EXAMINATION: CHEST RADIOGRAPH CLINICAL INFORMATION: Central line assessment TECHNIQUE: Portable AP view of the chest Completion time by technologist: 3/1/2025 5:41 am COMPARISON: 02/28/2025 FINDINGS: See Impression.    Impression: Life support and monitoring devices: Left IJ catheter terminates in the SVC. Lungs and pleura: Increased pulmonary edema. No pneumothorax. Heart, mediastinum and irma: Stable enlarged cardiac silhouette. Coronary artery stent. Left axillary stent. Vascular calcification of the aortic knob. Electronically Signed By: Nahum Adrian on March 01, 2025 08:04 AM I, Nahum Adrian, have personally reviewed this report and concur with its findings and conclusions, as affirmed by my electronic signature.    Procedure: XR chest portable  Result Date: 2/28/2025  Narrative: EXAMINATION: CHEST RADIOGRAPH CLINICAL INFORMATION: I63.312  Cerebral infarction due to thrombosis of left middle cerebral artery.   Central line assessment TECHNIQUE: Portable AP view of the chest COMPARISON: CT chest 02/27/2025.  FINDINGS: See Impression.    Impression: Life support and monitoring devices: Right IJ approach central venous catheter terminates in the SVC. Lungs and pleura: There is mild to moderate pulmonary edema. Small right pleural effusion is identified. Possible trace left pleural effusion. No pneumothorax. Heart, mediastinum and irma: Stable enlarged cardiac silhouette. Coronary artery stent is present. Electronically Signed By: Joshua Meier on February 28, 2025 08:05 PM I, Joshua Meier, have personally reviewed this report and concur with its findings and conclusions, as affirmed by my electronic signature.    Procedure: CTA Cardiac w FFR if needed  Result Date: 2/28/2025  Narrative: EXAMINATION: CARDIAC CTA WITH AND WITHOUT INTRAVENOUS CONTRAST CLINICAL INFORMATION: Aortic stenosis. TECHNIQUE: Volume noncontrast cardiac CT for calcium scoring and volume contrast-enhanced cardiac CT for cardiac and aortic root anatomy were performed. Post-processing:  3D advanced post-processing was performed on an independent workstation by the [facility] utilizing a combination of maximum intensity projection, multiplanar reformat and volume rendering techniques to better evaluate anatomy and disease processes. Contrast: 100 mL, Omnipaque 350 COMPARISON: None available. FINDINGS: CALCIUM SCORE Aortic valve calcium score (Agatston): 3349 CARDIAC FINDINGS: Cardiac size: Global enlargement of the cardiac chambers. Left atrial appendage: No Thrombus. Aortic valve: Severe calcification. Mitral Annular Calcification: Moderate calcification. Coronary arteries: Severe calcification and coronary artery stents. Coronary Bypass: N/a Other: No significant pericardial effusion. AORTIC ROOT MEASUREMENTS Mid to late Systole (best image): Reconstruction Phase: 40% Annular Area: 763 mm2 Annular perimeter: 101 mm Annular diameters: 35 mm x 29 mm Annulus to LM distance (oblique): 14 mm Annulus to LM distance (perpendicular): 14 mm Annulus to RCA distance (oblique):  23 mm Annulus to RCA distance (perpendicular): 22 mm Maximal sinus of Valsalva width (Left sinus to right-non commissure): 36 mm Maximal sinus of Valsalva width (Right sinus to left-non commissure): 34 mm Maximal sinus of Valsalva width (Non-coronary sinus to left-right commissure): 37 mm Sinus of Valsalva perimeter: 120 mm Left sinus of Valsalva height (oblique coronal view): 23 mm Right sinus of Valsalva height (oblique sagittal view): 28 mm  Non-coronary sinus of Valsalva height (oblique coronal view): 29 mm Aortic angulation (coronal view): 49 degrees EXTRACARDIAC FINDINGS: Please refer to report of extracardiac findings on associated CTA of the chest/abdomen/pelvis.    Impression: 1. Coronary-annular measurements as above. 2. Extensive aortic valve calcification. 3. Severe coronary artery atherosclerotic calcification and stents. Electronically Signed By: Peggy Gaviria on February 28, 2025 03:38 PM I, Peggy Gaviria, have personally reviewed this report and concur with its findings and conclusions, as affirmed by my electronic signature.    Procedure: CT Angiography Chest with IV Contrast  Result Date: 2/28/2025  Narrative: EXAMINATION: CT ANGIOGRAM OF THE CHEST WITH INTRAVENOUS CONTRAST CT ANGIOGRAM OF THE  ABDOMEN AND PELVIS WITH INTRAVENOUS CONTRAST CLINICAL INFORMATION: Aortic stenosis. TECHNIQUE: Axial images were obtained and multiplanar reconstructions were made. Post-Processing: 3D advanced post-processing was performed on an independent workstation by the [facility] utilizing a combination of MIP, MPR and VRT techniques to better evaluate anatomy and disease processes. Contrast: 100 mL agent and concentration COMPARISON: None available. FINDINGS: Aorta:  The ascending aorta is normal in caliber. Mild atherosclerotic calcifications of the thoracic aorta. Moderate to severe atherosclerotic calcifications of the abdominal aorta and its branches. No evidence of aortic aneurysm or dissection. Inner luminal  diameter: Right common iliac artery: Moderately calcified.  1.1 x 0.9 cm Right external iliac artery: Moderately calcified.  0.9 x 0.7 cm Right common femoral artery: Moderately calcified.  0.9 x 0.6 cm Left common iliac artery:  Mildly calcified.  1.2 x 0.8 cm Left external iliac artery:  Moderately calcified.  0.8 x 0.6 cm Left common femoral artery:  Moderately calcified.  0.9 x 0.6 cm CTA CHEST: Cardiac: Please see separately reported cardiac CTA. Pulmonary arteries: Normal size pulmonary arteries. Mediastinum and lymph nodes: Borderline enlarged mediastinal lymph nodes, likely reactive. Lungs/Pleura:  Mosaic attenuation and ground-glass in bilateral lungs. No consolidations or discrete pulmonary nodule. Bibasilar atelectatic changes. Trace bilateral pleural effusions. Airways: Visible airways are clear. Bones: Chronic fracture deformity of posterior right ribs. Degenerative changes of spine. Other Soft Tissues: The other soft tissues including thoracic wall are unremarkable. CTA ABDOMEN PELVIS: Hepatobiliary: Volume redistribution of hepatic lobes with lobulated hepatic contour. No focal lesion identified. Post cholecystectomy. Spleen: The spleen is enlarged, measures 15 cm in craniocaudal dimension. Pancreas: 2.4 cm hypoattenuating lesion in the pancreatic body (7:31). No main pancreatic ductal dilatation. Adrenal Glands: The adrenal glands are unremarkable. Genitourinary: There is no evidence of urinary tract calculus or hydronephrosis. Atrophic kidneys. Bladder is underdistended. Prostate is enlarged. Peritoneum / Retroperitoneum: Small volume abdominopelvic ascites. No pneumoperitoneum. Gastrointestinal Tract: Duodenal diverticulum. No bowel obstruction. Lymph Nodes: Lymph nodes are unremarkable. Bones: Degenerative changes of spine. Other Soft Tissues: The other soft tissues including abdominopelvic wall are unremarkable.    Impression: 1. TAVR study with measurements as above. Moderate to severe  atherosclerotic changes of the aortoiliac vessels, no aneurysm or dissection. 2. Mosaic attenuation and ground-glass density in bilateral lungs, may reflect a combination of small airway disease and pulmonary edema. 3. Cirrhotic liver morphology with portal hypertension stigmata including splenomegaly and small volume abdominopelvic ascites. 4. 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 Further evaluation with MRI abdomen/MRCP is recommended. Electronically Signed By: Peggy Gaviria on February 28, 2025 01:47 PM I, Peggy Gaviria, have personally reviewed this report and concur with its findings and conclusions, as affirmed by my electronic signature.    Procedure: CTA abdomen pelvis w wo contrast  Result Date: 2/28/2025  Narrative: EXAMINATION: CT ANGIOGRAM OF THE CHEST WITH INTRAVENOUS CONTRAST CT ANGIOGRAM OF THE  ABDOMEN AND PELVIS WITH INTRAVENOUS CONTRAST CLINICAL INFORMATION: Aortic stenosis. TECHNIQUE: Axial images were obtained and multiplanar reconstructions were made. Post-Processing: 3D advanced post-processing was performed on an independent workstation by the [facility] utilizing a combination of MIP, MPR and VRT techniques to better evaluate anatomy and disease processes. Contrast: 100 mL agent and concentration COMPARISON: None available. FINDINGS: Aorta:  The ascending aorta is normal in caliber. Mild atherosclerotic calcifications of the thoracic aorta. Moderate to severe atherosclerotic calcifications of the abdominal aorta and its branches. No evidence of aortic aneurysm or dissection. Inner luminal diameter: Right common iliac artery: Moderately calcified.  1.1 x 0.9 cm Right external iliac artery: Moderately calcified.  0.9 x 0.7 cm Right common femoral artery: Moderately calcified.  0.9 x 0.6 cm Left common iliac artery:  Mildly calcified.  1.2 x 0.8 cm Left external iliac artery:  Moderately calcified.  0.8 x 0.6 cm Left common femoral artery:  Moderately calcified.   0.9 x 0.6 cm CTA CHEST: Cardiac: Please see separately reported cardiac CTA. Pulmonary arteries: Normal size pulmonary arteries. Mediastinum and lymph nodes: Borderline enlarged mediastinal lymph nodes, likely reactive. Lungs/Pleura:  Mosaic attenuation and ground-glass in bilateral lungs. No consolidations or discrete pulmonary nodule. Bibasilar atelectatic changes. Trace bilateral pleural effusions. Airways: Visible airways are clear. Bones: Chronic fracture deformity of posterior right ribs. Degenerative changes of spine. Other Soft Tissues: The other soft tissues including thoracic wall are unremarkable. CTA ABDOMEN PELVIS: Hepatobiliary: Volume redistribution of hepatic lobes with lobulated hepatic contour. No focal lesion identified. Post cholecystectomy. Spleen: The spleen is enlarged, measures 15 cm in craniocaudal dimension. Pancreas: 2.4 cm hypoattenuating lesion in the pancreatic body (7:31). No main pancreatic ductal dilatation. Adrenal Glands: The adrenal glands are unremarkable. Genitourinary: There is no evidence of urinary tract calculus or hydronephrosis. Atrophic kidneys. Bladder is underdistended. Prostate is enlarged. Peritoneum / Retroperitoneum: Small volume abdominopelvic ascites. No pneumoperitoneum. Gastrointestinal Tract: Duodenal diverticulum. No bowel obstruction. Lymph Nodes: Lymph nodes are unremarkable. Bones: Degenerative changes of spine. Other Soft Tissues: The other soft tissues including abdominopelvic wall are unremarkable.    Impression: 1. TAVR study with measurements as above. Moderate to severe atherosclerotic changes of the aortoiliac vessels, no aneurysm or dissection. 2. Mosaic attenuation and ground-glass density in bilateral lungs, may reflect a combination of small airway disease and pulmonary edema. 3. Cirrhotic liver morphology with portal hypertension stigmata including splenomegaly and small volume abdominopelvic ascites. 4. 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 Further evaluation with MRI abdomen/MRCP is recommended. Electronically Signed By: Peggy Gaviria on February 28, 2025 01:47 PM I, Peggy Everette, have personally reviewed this report and concur with its findings and conclusions, as affirmed by my electronic signature.      60 minutes total time spent on 6/19/2025 in caring for this patient including obtaining/reviewing history, symptom assessment and management, medication review / adjustment, psychosocial support, reviewing / ordering tests, medicines, imaging, procedures, goals of care, supportive listening, anticipatory guidance, patient/family education, instructions for management, importance of adhering to treatment plan, risks/benefits of treatment(s), and documenting in the medical record. All of the patient's questions were answered during this discussion.    BRITTANY Menendez  [facility], [place]  [phone]    Portions of this document may have been created using dictation software and as such some \"sound alike\" terms may have been generated by the system. Do not hesitate to contact me with any questions or clarifications.          [1]   Current Outpatient Medications:     apixaban (Eliquis) 2.5 mg, Take 1 tablet (2.5 mg total) by mouth 2 (two) times a day, Disp: 60 tablet, Rfl: 3    aspirin 81 mg chewable tablet, Chew 1 tablet in the morning., Disp: , Rfl:     atorvastatin (LIPITOR) 40 mg tablet, Take 1 tablet (40 mg total) by mouth daily with dinner, Disp: 90 tablet, Rfl: 3    midodrine (PROAMATINE) 5 mg tablet, Take 3 tablets (15 mg total) by mouth 3 (three) times a day before meals, Disp: 810 tablet, Rfl: 0    prasugrel (EFFIENT) tablet, Take 1 tablet (10 mg total) by mouth daily, Disp: 90 tablet, Rfl: 3    Vitamin D3 1.25 MG (20556 UT) capsule, TAKE 1 CAPSULE BY MOUTH ONE TIME PER WEEK, Disp: 12 capsule, Rfl: 4  No current facility-administered medications for this visit.    "

## 2025-06-19 NOTE — PROGRESS NOTES
"Daily Note     Today's date: 2025  Patient name: Asad Galloway  : 1960  MRN: [id number]  Referring provider: Mallorie Ulloa*  Dx:   Encounter Diagnoses   Name Primary?    H/O: stroke Yes    Self-care deficit     Cognitive deficits following cerebral infarction      Start Time:   Stop Time:   Total time in clinic (min): 39 minutes    PLAN OF CARE START: 25  PLAN OF CARE END: 25  FREQUENCY: 2x/week  PRECAUTIONS dialysis 3x/week; NO FOOD OR WATER CAN BE GIVEN TO PATIENT; scared of dogs; do not take BP in L UE    Subjective: \"I feel weak\"    Objective: See treatment below.    TE:  3x10 sets of biceps curls with 2 lb dumb bells.   3x10 sets of lateral raises with 2 lb dumb bells.   Rows with 1 lb barbell and yellow Theraband     TA:   Balloon taps with pt raising arms overhead to hit balloon back to there therapist. Working on bilateral UE coordination, UE endurance.   Boxing with patient alternating hands and punching across body for 3x10 reps with each hand. Trunk rotation, UE endurance, functional activity tolerance.  Completed bean bag toss activity in standing with pt alternating hands every 3 bags. Working on standing tolerance for ADLs.     Assessment: Tolerated treatment well. Pt continues to be quiet thru session, however benefits from cues from the therapist to improve engagement. . Demonstrating continued deficits with b/l UE, standing tolerance, and cognition. Pt would benefit from continued OT services to address strength, endurance, and activity tolerance for ADLs and IADLs.       Plan: Continued skilled OT per POC.        "

## 2025-06-22 ENCOUNTER — APPOINTMENT (EMERGENCY)
Dept: CT IMAGING | Facility: HOSPITAL | Age: 65
End: 2025-06-22
Payer: MEDICARE

## 2025-06-22 ENCOUNTER — HOSPITAL ENCOUNTER (EMERGENCY)
Facility: HOSPITAL | Age: 65
Discharge: HOME | End: 2025-06-23
Attending: EMERGENCY MEDICINE | Admitting: EMERGENCY MEDICINE
Payer: MEDICARE

## 2025-06-22 ENCOUNTER — APPOINTMENT (EMERGENCY)
Dept: RADIOLOGY | Facility: HOSPITAL | Age: 65
End: 2025-06-22
Payer: MEDICARE

## 2025-06-22 VITALS
SYSTOLIC BLOOD PRESSURE: 115 MMHG | DIASTOLIC BLOOD PRESSURE: 55 MMHG | OXYGEN SATURATION: 96 % | HEART RATE: 83 BPM | TEMPERATURE: 98.2 F | RESPIRATION RATE: 18 BRPM

## 2025-06-22 DIAGNOSIS — T07.XXXA MULTIPLE ABRASIONS: ICD-10-CM

## 2025-06-22 DIAGNOSIS — M79.602 LEFT ARM PAIN: ICD-10-CM

## 2025-06-22 DIAGNOSIS — R05.9 COUGH: ICD-10-CM
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
## 2025-06-22 PROCEDURE — 73130 X-RAY EXAM OF HAND: CPT

## 2025-06-22 PROCEDURE — 99284 EMERGENCY DEPT VISIT MOD MDM: CPT

## 2025-06-22 PROCEDURE — 99285 EMERGENCY DEPT VISIT HI MDM: CPT | Performed by: EMERGENCY MEDICINE

## 2025-06-22 PROCEDURE — 73564 X-RAY EXAM KNEE 4 OR MORE: CPT

## 2025-06-22 PROCEDURE — 73090 X-RAY EXAM OF FOREARM: CPT

## 2025-06-22 PROCEDURE — 71046 X-RAY EXAM CHEST 2 VIEWS: CPT

## 2025-06-22 PROCEDURE — 70450 CT HEAD/BRAIN W/O DYE: CPT

## 2025-06-22 PROCEDURE — 73080 X-RAY EXAM OF ELBOW: CPT

## 2025-06-22 RX ORDER — GINSENG 100 MG
1 CAPSULE ORAL ONCE
Status: COMPLETED | OUTPATIENT
Start: 2025-06-22 | End: 2025-06-22

## 2025-06-22 RX ORDER — ACETAMINOPHEN 325 MG/1
650 TABLET ORAL ONCE
Status: COMPLETED | OUTPATIENT
Start: 2025-06-22 | End: 2025-06-22

## 2025-06-22 RX ADMIN — ACETAMINOPHEN 650 MG: 325 TABLET ORAL at 22:32

## 2025-06-22 RX ADMIN — BACITRACIN 1 LARGE APPLICATION: 500 OINTMENT TOPICAL at 22:31

## 2025-06-23 NOTE — ASSESSMENT & PLAN NOTE
Patient follows with palliative care for psychosocial support and symptom management of ESRD, CHF, CVA  Goals of Care- Treatment focused. Goals remain clear at this time.  Symptoms - well controlled.   ACP -  nothing on file  Next visit - 6 months      Social support  Supportive listening provided  Normalized experience of patient/family  Provided anxiety containment  Provided anticipatory guidance  Patient lives with  Spouse  Has 4 adult children  All are involved in care

## 2025-06-23 NOTE — DISCHARGE INSTRUCTIONS
You were evaluated in the emergency department for: fall. You can access your current and pending results through [facility]. A radiologist will take a second look at your X-Rays, if you had any, and you will be contacted with any new findings.     You should follow-up with your primary care provider, as soon as possible, for re-evaluation.  If you do not have a primary care provider, I have referred you to [facility]. You will be contacted about scheduling an appointment. Their phone number is also included on this paperwork.     Your workup revealed no emergent features at this time; however, many disease processes are dynamic:    Please, return to the emergency department if you experience new or worsening symptoms, fever, chest pain, shortness of breath, difficulty breathing, dizziness, abdominal pain, persistent nausea/vomiting, syncope or passing out, blood in your urine or stool, coughing up blood, leg swelling/pain, urinary retention, bowel or bladder incontinence, numbness between your legs.    You may take 650mg of tylenol every four to six hours, not exceeding 3,000mg daily, for the management of your discomfort.

## 2025-06-23 NOTE — ED CARE HANDOFF
Emergency Department Sign Out Note        Sign out and transfer of care from Dr. Bryson. See Separate Emergency Department note.     The patient, Asad Galloway, was evaluated by the previous provider for trauma.    Workup Completed:  Yes except CT head pending    ED Course / Workup Pending (followup):  CT head: FINDINGS:     PARENCHYMA: Decreased attenuation is noted in periventricular and subcortical white matter demonstrating an appearance that is statistically most likely to represent moderate microangiopathic change. Chronic right frontal periventricular, left occipital   region and left thalamic infarcts.     No CT signs of acute infarction.  No intracranial mass, mass effect or midline shift.  No acute parenchymal hemorrhage.     VENTRICLES AND EXTRA-AXIAL SPACES:  Normal for the patient's age.     VISUALIZED ORBITS:  Normal visualized orbits.     PARANASAL SINUSES:  Normal visualized paranasal sinuses.     CALVARIUM AND EXTRACRANIAL SOFT TISSUES:  Normal.     IMPRESSION:     No acute intracranial abnormality. Chronic microangiopathic changes.                 Workstation performed: [id number]        No data recorded        CT head d/w patient and wife.                      Procedures  Medical Decision Making  Amount and/or Complexity of Data Reviewed  Radiology: ordered and independent interpretation performed.    Risk  OTC drugs.            Disposition  Final diagnoses:   Fall, initial encounter   Multiple abrasions   Left arm pain   Cough     Time reflects when diagnosis was documented in both MDM as applicable and the Disposition within this note       Time User Action Codes Description Comment    6/22/2025 10:02 PM Rayo Bryson Add [W19.XXXA] Fall, initial encounter     6/22/2025 10:03 PM Rayo Bryson Add [T07.XXXA] Multiple abrasions     6/22/2025 10:03 PM Rayo Bryson Add [M79.602] Left arm pain     6/22/2025 10:03 PM Rayo Bryson Add [R05.9] Cough           ED  Disposition       ED Disposition   Discharge    Condition   Stable    Date/Time   Sun Jun 22, 2025 10:45 PM    Comment   Asad Galloway discharge to home/self care.                   Follow-up Information       Follow up With Specialties Details Why Contact Info    [facility], Nurse Practitioner Schedule an appointment as soon as possible for a visit   [address] [facility]  [address]  [place] [address] [phone]            Current Discharge Medication List        CONTINUE these medications which have NOT CHANGED    Details   apixaban (Eliquis) 2.5 mg Take 1 tablet (2.5 mg total) by mouth 2 (two) times a day  Qty: 60 tablet, Refills: 3    Associated Diagnoses: History of DVT (deep vein thrombosis)      aspirin 81 mg chewable tablet Chew 1 tablet in the morning.      atorvastatin (LIPITOR) 40 mg tablet Take 1 tablet (40 mg total) by mouth daily with dinner  Qty: 90 tablet, Refills: 3    Associated Diagnoses: Mixed hyperlipidemia      midodrine (PROAMATINE) 5 mg tablet Take 3 tablets (15 mg total) by mouth 3 (three) times a day before meals  Qty: 810 tablet, Refills: 0    Associated Diagnoses: HFrEF (heart failure with reduced ejection fraction) ([facility])      prasugrel (EFFIENT) tablet Take 1 tablet (10 mg total) by mouth daily  Qty: 90 tablet, Refills: 3    Associated Diagnoses: Status post insertion of drug eluting coronary artery stent; Ischemic cardiomyopathy      Vitamin D3 1.25 MG (59626 UT) capsule TAKE 1 CAPSULE BY MOUTH ONE TIME PER WEEK  Qty: 12 capsule, Refills: 4    Associated Diagnoses: Vitamin D deficiency           No discharge procedures on file.       ED Provider  Electronically Signed by     Awais Machado MD  06/23/25 0025

## 2025-06-23 NOTE — ASSESSMENT & PLAN NOTE
Wt Readings from Last 3 Encounters:   06/19/25 92 kg (202 lb 13.2 oz)   05/27/25 91.6 kg (202 lb)   05/27/25 91.6 kg (202 lb)       Most recent EF 25% with evidence of severe aortic stenosis   patient gets weighed at HD, does not take weight at home..  TAVR scheduled for 7/1  Follow up with Cardiology on 6/24

## 2025-06-23 NOTE — ASSESSMENT & PLAN NOTE
Lab Results   Component Value Date    EGFR 7 05/23/2025    EGFR 10 05/22/2025    EGFR 8 05/21/2025    CREATININE 7.10 (H) 05/23/2025    CREATININE 5.43 (H) 05/22/2025    CREATININE 6.59 (H) 05/21/2025     HD on MWF  AV fistula  Follows with nephrology

## 2025-06-23 NOTE — ED PROVIDER NOTES
Time reflects when diagnosis was documented in both MDM as applicable and the Disposition within this note       Time User Action Codes Description Comment    6/22/2025 10:02 PM Rayo Bryson Add [W19.XXXA] Fall, initial encounter     6/22/2025 10:03 PM Rayo Bryson Add [T07.XXXA] Multiple abrasions     6/22/2025 10:03 PM DarinRayo bolden Add [M79.602] Left arm pain     6/22/2025 10:03 PM Rayo Bryson Add [R05.9] Cough           ED Disposition       ED Disposition   Discharge    Condition   Stable    Date/Time   Sun Jun 22, 2025 10:45 PM    Comment   Asad Galloway discharge to home/self care.                   Assessment & Plan       Medical Decision Making  Patient is a 64-year-old male, with a history significant for CAD, DVT anticoagulated on Eliquis, ESRD on dialysis (anuric per patient, last dialysis Friday), prior stroke per my review the medical record, who presents to the ED today for evaluation after witnessed fall with no head strike.  Patient states he was in his overall usual state of health until around 3 PM when, while walking inside from the car, he attempted to fold up his wheelchair causing him to fall over.  Patient's wife, present in room and providing collateral history, confirms this history and states that when he fell he did not strike his head.  Patient struck his right knee and left arm and he has wounds on his left arm and knee.  They attempted to put dressings on his wounds to remit the bleeding that occurred.  Patient also describes nonproductive cough over the recent past but denies chest pain, dyspnea, orthopnea, abdominal pain, rash, new weakness or numbness, fever.  Patient did not take anything to remit his discomfort prehospital.  Touch/movement exacerbates his extremity discomfort.  Patient has been ambulatory since the fall. Per my review of the medical record, patient's last tetanus update 2022.  Patient is currently afebrile and hemodynamically  stable.  Physical exam is notable for skin tears over left elbow and right knee, hemostasis nearly achieved.  Tenderness to palpation of the distal left upper extremity with soft compartments.  Clear heart and lungs, soft nontender abdomen.  This presentation is concerning for: Mechanical fall, sprain, strain, fracture, abrasion.  ICH also considered.  No reason to suspect syncope, compartment syndrome, neurovascular injury based on history/physical exam.  Patient at risk for viral syndrome.  Will investigate with CT head, chest x-ray, x-ray imaging of the right knee and left upper extremity.  Will manage with bacitracin, wound dressings, Tylenol, further based on workup.    Amount and/or Complexity of Data Reviewed  Radiology: ordered and independent interpretation performed.    Risk  OTC drugs.        ED Course as of 06/22/25 2259   Sun Jun 22, 2025 2231 On reevaluation, patient mentating well.  Bleeding controlled.  Patient's upper extremities reexamined: No tenderness to palpation of the wrists, hands, forearms, anatomic snuffboxs' bilaterally   2233 Patient has dialysis planned for tomorrow       Medications   bacitracin topical ointment 1 large application (1 large application Topical Given 6/22/25 2231)   acetaminophen (TYLENOL) tablet 650 mg (650 mg Oral Given 6/22/25 2232)       ED Risk Strat Scores                    No data recorded        SBIRT 20yo+      Flowsheet Row Most Recent Value   Initial Alcohol Screen: US AUDIT-C     1. How often do you have a drink containing alcohol? 0 Filed at: 06/22/2025 1847   2. How many drinks containing alcohol do you have on a typical day you are drinking?  0 Filed at: 06/22/2025 1847   3a. Male UNDER 65: How often do you have five or more drinks on one occasion? 0 Filed at: 06/22/2025 1847   3b. FEMALE Any Age, or MALE 65+: How often do you have 4 or more drinks on one occassion? 0 Filed at: 06/22/2025 1847   Audit-C Score 0 Filed at: 06/22/2025 1847   XIN: How  many times in the past year have you...    Used an illegal drug or used a prescription medication for non-medical reasons? Never Filed at: 06/22/2025 1847                            History of Present Illness       Chief Complaint   Patient presents with    Fall     Mechanical fall, striking left elbow on counter. (+) thinners. X2 small skin tears present to elbow.        Past Medical History[1]   Past Surgical History[2]   Family History[3]   Social History[4]   E-Cigarette/Vaping    E-Cigarette Use Never User       E-Cigarette/Vaping Substances    Nicotine No     THC No     CBD No     Flavoring No     Other No     Unknown No       I have reviewed and agree with the history as documented.     Patient is a 64-year-old male, with a history significant for CAD, DVT anticoagulated on Eliquis, ESRD on dialysis (anuric per patient, last dialysis Friday), prior stroke per my review the medical record, who presents to the ED today for evaluation after witnessed fall with no head strike.  Patient states he was in his overall usual state of health until around 3 PM when, while walking inside from the car, he attempted to fold up his wheelchair causing him to fall over.  Patient's wife, present in room and providing collateral history, confirms this history and states that when he fell he did not strike his head.  Patient struck his right knee and left arm and he has wounds on his left arm and knee.  They attempted to put dressings on his wounds to remit the bleeding that occurred.  Patient also describes nonproductive cough over the recent past but denies chest pain, dyspnea, orthopnea, abdominal pain, rash, new weakness or numbness, fever.  Patient did not take anything to remit his discomfort prehospital.  Touch/movement exacerbates his extremity discomfort.  Patient has been ambulatory since the fall. Per my review of the medical record, patient's last tetanus update 2022.  Patient is without other concerns at this  time.        Review of Systems   Constitutional:  Negative for fever.   HENT:  Negative for trouble swallowing.    Eyes:  Negative for visual disturbance.   Respiratory:  Positive for cough. Negative for shortness of breath.    Cardiovascular:  Negative for chest pain.   Gastrointestinal:  Negative for abdominal pain.   Endocrine: Negative for polyuria.   Genitourinary:  Negative for dysuria.   Musculoskeletal:  Positive for arthralgias. Gait problem: Chronic, no new.  Skin:  Positive for wound.   Allergic/Immunologic: Negative for environmental allergies.   Neurological:  Negative for weakness and numbness.   Hematological:  Negative for adenopathy.   Psychiatric/Behavioral:  Negative for confusion.    All other systems reviewed and are negative.          Objective       ED Triage Vitals [06/22/25 1847]   Temperature Pulse Blood Pressure Respirations SpO2 Patient Position - Orthostatic VS   98.2 °F (36.8 °C) 83 115/55 18 96 % --      Temp Source Heart Rate Source BP Location FiO2 (%) Pain Score    Oral Monitor Right arm -- --      Vitals      Date and Time Temp Pulse SpO2 Resp BP Pain Score FACES Pain Rating User   06/22/25 1847 98.2 °F (36.8 °C) 83 96 % 18 115/55 -- -- AW            Physical Exam  Vitals and nursing note reviewed.   Constitutional:       General: He is not in acute distress.     Appearance: He is ill-appearing (Chronic appearing). He is not toxic-appearing.   HENT:      Head: Normocephalic and atraumatic.      Right Ear: External ear normal.      Left Ear: External ear normal.      Nose: Nose normal. No rhinorrhea.      Mouth/Throat:      Mouth: Mucous membranes are moist.      Pharynx: Oropharynx is clear. No oropharyngeal exudate or posterior oropharyngeal erythema.      Comments: Uvula midline. No oropharyngeal or submandibular mass/swelling    Eyes:      General: No scleral icterus.        Right eye: No discharge.         Left eye: No discharge.      Conjunctiva/sclera: Conjunctivae normal.       Pupils: Pupils are equal, round, and reactive to light.     Neck:      Comments: Patient is spontaneously rotating their neck to the left and right during the history and physical exam interaction without difficulty or apparent discomfort    Cardiovascular:      Rate and Rhythm: Normal rate and regular rhythm.      Pulses: Normal pulses.      Heart sounds: Normal heart sounds. No murmur heard.     No friction rub. No gallop.      Comments: 2+ Radial  Pulmonary:      Effort: Pulmonary effort is normal. No respiratory distress.      Breath sounds: Normal breath sounds. No stridor. No wheezing, rhonchi or rales.   Abdominal:      General: Abdomen is flat. There is no distension.      Palpations: Abdomen is soft.      Tenderness: There is no abdominal tenderness. There is no right CVA tenderness, left CVA tenderness, guarding or rebound.     Musculoskeletal:         General: Swelling and tenderness present. No deformity.      Cervical back: Neck supple. No tenderness. No muscular tenderness.      Comments: Skin tears over right knee and left elbow.  Tenderness to palpation of the left elbow and left wrist.  Soft compartments/no pain out of proportion to exam.  Dialysis fistula present    No tenderness to palpation of the bilateral shoulders, elbows, arms (other than above mentioned LUE pain), thighs, knees, legs. No chest wall or pelvic tenderness to palpation   No midline C, T, L spine tenderness to palpation     Lymphadenopathy:      Cervical: No cervical adenopathy.     Skin:     General: Skin is warm and dry.      Capillary Refill: Capillary refill takes less than 2 seconds.     Neurological:      Mental Status: He is alert.      Comments: Patient is speaking clearly in complete sentences.  Patient is answering appropriately and able follow commands.  Patient is moving all four extremities spontaneously. Tongue midline.      Psychiatric:         Mood and Affect: Mood normal.         Behavior: Behavior normal.          Results Reviewed       None            XR elbow 3+ vw LEFT   ED Interpretation by Rayo Bryson MD (06/22 2225)   Per my independent interpretation: No acute osseous abnormality      XR knee 4+ vw right injury   ED Interpretation by Rayo Bryson MD (06/22 2225)   Per my independent interpretation: No acute osseous abnormality      XR chest 2 views   ED Interpretation by Rayo Bryson MD (06/22 2224)   Per my independent interpretation: Cardiomegaly, no acute cardiopulmonary process      XR hand 3+ vw left   ED Interpretation by Rayo Bryson MD (06/22 2225)   Per my independent interpretation: No acute osseous abnormality      XR forearm 2 vw left   ED Interpretation by Rayo Bryson MD (06/22 2225)   Per my independent interpretation: No acute osseous abnormality      CT head without contrast    (Results Pending)       Procedures    ED Medication and Procedure Management   Prior to Admission Medications   Prescriptions Last Dose Informant Patient Reported? Taking?   Vitamin D3 1.25 MG (85139 UT) capsule  Child No No   Sig: TAKE 1 CAPSULE BY MOUTH ONE TIME PER WEEK   apixaban (Eliquis) 2.5 mg  Child No No   Sig: Take 1 tablet (2.5 mg total) by mouth 2 (two) times a day   aspirin 81 mg chewable tablet  Child Yes No   Sig: Chew 1 tablet in the morning.   atorvastatin (LIPITOR) 40 mg tablet  Child No No   Sig: Take 1 tablet (40 mg total) by mouth daily with dinner   midodrine (PROAMATINE) 5 mg tablet  Child No No   Sig: Take 3 tablets (15 mg total) by mouth 3 (three) times a day before meals   prasugrel (EFFIENT) tablet  Child No No   Sig: Take 1 tablet (10 mg total) by mouth daily      Facility-Administered Medications: None     Current Discharge Medication List        CONTINUE these medications which have NOT CHANGED    Details   apixaban (Eliquis) 2.5 mg Take 1 tablet (2.5 mg total) by mouth 2 (two) times a day  Qty: 60 tablet, Refills: 3    Associated Diagnoses:  History of DVT (deep vein thrombosis)      aspirin 81 mg chewable tablet Chew 1 tablet in the morning.      atorvastatin (LIPITOR) 40 mg tablet Take 1 tablet (40 mg total) by mouth daily with dinner  Qty: 90 tablet, Refills: 3    Associated Diagnoses: Mixed hyperlipidemia      midodrine (PROAMATINE) 5 mg tablet Take 3 tablets (15 mg total) by mouth 3 (three) times a day before meals  Qty: 810 tablet, Refills: 0    Associated Diagnoses: HFrEF (heart failure with reduced ejection fraction) ([facility])      prasugrel (EFFIENT) tablet Take 1 tablet (10 mg total) by mouth daily  Qty: 90 tablet, Refills: 3    Associated Diagnoses: Status post insertion of drug eluting coronary artery stent; Ischemic cardiomyopathy      Vitamin D3 1.25 MG (29893 UT) capsule TAKE 1 CAPSULE BY MOUTH ONE TIME PER WEEK  Qty: 12 capsule, Refills: 4    Associated Diagnoses: Vitamin D deficiency           No discharge procedures on file.  ED SEPSIS DOCUMENTATION   Time reflects when diagnosis was documented in both MDM as applicable and the Disposition within this note       Time User Action Codes Description Comment    6/22/2025 10:02 PM Rayo Bryson [W19.XXXA] Fall, initial encounter     6/22/2025 10:03 PM Rayo Bryson Add [T07.XXXA] Multiple abrasions     6/22/2025 10:03 PM Rayo Bryson Add [M79.602] Left arm pain     6/22/2025 10:03 PM Rayo Bryson Add [R05.9] Cough                      [1]   Past Medical History:  Diagnosis Date    Cerebrovascular accident (CVA) due to thrombosis of left middle cerebral artery (HCC) 07/29/2018    Chronic kidney disease     Coronary artery disease     Diabetes mellitus (HCC)     not on meds    Dialysis patient ([facility])     M-W-F    Fistula     left upper arm for hemodialyis    GERD (gastroesophageal reflux disease)     History of coronary artery stent placement     x3    Hypercholesteremia     Hyperlipidemia     Hypertension     Infectious viral hepatitis     B as child    Limb  "alert care status     no BP/IV left arm    Neuropathy     Nonhealing ulcer of heel (HCC) 04/25/2023    Obesity     Osteomyelitis ([facility])     last assessed 11/4/16-per son not currently    Penetrating foot wound, left, initial encounter 01/19/2022    PVC's (premature ventricular contractions)     sees  Cardio    Stroke ([facility])     last weeof July 2018 [facility]    TIA (transient ischemic attack) 10/28/2018    Ulcer of left heel, limited to breakdown of skin ([facility]) 06/13/2024    Wears dentures     Wears glasses    [2]   Past Surgical History:  Procedure Laterality Date    ABDOMINAL SURGERY      CARDIAC CATHETERIZATION N/A 05/02/2022    Procedure: Cardiac Coronary Angiogram;  Surgeon: Sam Davis MD;  [facility];  Service: Cardiology    CARDIAC CATHETERIZATION N/A 05/02/2022    Procedure: Cardiac pci;  Surgeon: Sam Davis MD;  [facility];  Service: Cardiology    CARDIAC CATHETERIZATION  02/01/2023    Procedure: Cardiac catheterization;  Surgeon: Sam Davis MD;  [facility];  Service: Cardiology    CARDIAC CATHETERIZATION N/A 02/01/2023    Procedure: Cardiac pci;  Surgeon: Sam Davis MD;  [facility];  Service: Cardiology    CARDIAC CATHETERIZATION N/A 02/01/2023    Procedure: Cardiac Coronary Angiogram;  Surgeon: Sam Davis MD;  [facility];  Service: Cardiology    CARDIAC CATHETERIZATION N/A 02/01/2023    Procedure: Cardiac other-IVUS;  Surgeon: Sam Davis MD;  [facility];  Service: Cardiology    CHOLECYSTECTOMY      Percutaneous    COLONOSCOPY      CORONARY ANGIOPLASTY WITH STENT PLACEMENT      CYSTOSCOPY      HEMODIALYSIS ADULT  1/22/2024    HEMODIALYSIS ADULT  01/24/2024    HEMODIALYSIS ADULT  5/19/2025    IR LOWER EXTREMITY ANGIOGRAM  9/29/2023    IR LOWER EXTREMITY ANGIOGRAM  02/26/2024    OTHER SURGICAL HISTORY      \"stimulator to control bowel movements\"    CT " "ESOPHAGOGASTRODUODENOSCOPY TRANSORAL DIAGNOSTIC N/A 09/27/2016    Procedure: ESOPHAGOGASTRODUODENOSCOPY (EGD);  Surgeon: Adele Rowe MD;  Location: [facility] GI [facility];  Service: Gastroenterology    NM LAPAROSCOPY SURG CHOLECYSTECTOMY N/A 02/29/2016    Procedure: LAPAROSCOPIC CHOLECYSTECTOMY ;  Surgeon: Cliff Roman DO;  Location: [facility];  Service: General    NM SLCTV CATHJ 3RD+ ORD SLCTV ABDL PEL/LXTR BRNCH Left 9/29/2023    Procedure: Left leg angiogram with intervention;  Surgeon: Michelle Galvan MD;  Location: [facility];  Service: Vascular    NM SLCTV CATHJ 3RD+ ORD SLCTV ABDL PEL/LXTR BRNCH Left 02/26/2024    Procedure: Left leg angiogram antegrade access, left popliteal angioplasty;  Surgeon: Michelle Galvan MD;  Location: [facility];  Service: Vascular    ROTATOR CUFF REPAIR Right     SPINAL CORD STIMULATOR IMPLANT      \"Medtronic interstim model # 3058- in lower back to control bowel movements-currently turned off-battery is dead\"    TOE AMPUTATION Right 10/28/2016    Procedure: 3RD TOE AMPUTATION ;  Surgeon: Anjel Salas DPM;  Location: [facility];  Service:    [3]   Family History  Problem Relation Name Age of Onset    Leukemia Mother      Liver disease Mother      Lung cancer Mother          heavy smoker - 3 ppd    Heart disease Father Son     Liver disease Father Son     Diabetes type I Father Son     Multiple myeloma Sister      Breast cancer Sister      Urolithiasis Family      Alcohol abuse Neg Hx      Depression Neg Hx      Drug abuse Neg Hx      Substance Abuse Neg Hx      Mental illness Neg Hx     [4]   Social History  Tobacco Use    Smoking status: Never    Smokeless tobacco: Never   Vaping Use    Vaping status: Never Used   Substance Use Topics    Alcohol use: Never    Drug use: No        Rayo Bryson MD  06/22/25 5994    "

## 2025-06-24 ENCOUNTER — TELEMEDICINE (OUTPATIENT)
Dept: CARDIOLOGY CLINIC | Facility: CLINIC | Age: 65
End: 2025-06-24
Payer: MEDICARE

## 2025-06-24 ENCOUNTER — PROCEDURE VISIT (OUTPATIENT)
Dept: PODIATRY | Facility: CLINIC | Age: 65
End: 2025-06-24
Payer: MEDICARE

## 2025-06-24 ENCOUNTER — TELEPHONE (OUTPATIENT)
Age: 65
End: 2025-06-24

## 2025-06-24 VITALS
SYSTOLIC BLOOD PRESSURE: 104 MMHG | BODY MASS INDEX: 29.63 KG/M2 | HEART RATE: 74 BPM | DIASTOLIC BLOOD PRESSURE: 55 MMHG | WEIGHT: 200.62 LBS

## 2025-06-24 VITALS — BODY MASS INDEX: 29.62 KG/M2 | HEIGHT: 69 IN | WEIGHT: 200 LBS

## 2025-06-24 DIAGNOSIS — Z89.421 ABSENCE OF TOE OF RIGHT FOOT (HCC): ICD-10-CM

## 2025-06-24 DIAGNOSIS — I35.0 NONRHEUMATIC AORTIC VALVE STENOSIS: ICD-10-CM

## 2025-06-24 DIAGNOSIS — I25.5 ISCHEMIC CARDIOMYOPATHY: ICD-10-CM

## 2025-06-24 DIAGNOSIS — I25.10 CAD, MULTIPLE VESSEL: Primary | ICD-10-CM

## 2025-06-24 DIAGNOSIS — B35.1 ONYCHOMYCOSIS: ICD-10-CM

## 2025-06-24 DIAGNOSIS — E11.42 DIABETIC POLYNEUROPATHY ASSOCIATED WITH TYPE 2 DIABETES MELLITUS (HCC): Primary | ICD-10-CM

## 2025-06-24 DIAGNOSIS — I50.22 CHRONIC SYSTOLIC HEART FAILURE (HCC): ICD-10-CM

## 2025-06-24 DIAGNOSIS — I73.9 PERIPHERAL ARTERIAL DISEASE (HCC): ICD-10-CM

## 2025-06-24 DIAGNOSIS — Z99.2 ESRD ON DIALYSIS (HCC): ICD-10-CM

## 2025-06-24 DIAGNOSIS — I50.20 HFREF (HEART FAILURE WITH REDUCED EJECTION FRACTION) (HCC): ICD-10-CM

## 2025-06-24 DIAGNOSIS — L85.1 ACQUIRED KERATODERMA: ICD-10-CM

## 2025-06-24 DIAGNOSIS — L97.512 ULCER OF TOE OF RIGHT FOOT, WITH FAT LAYER EXPOSED (HCC): ICD-10-CM

## 2025-06-24 DIAGNOSIS — Z95.5 STATUS POST INSERTION OF DRUG ELUTING CORONARY ARTERY STENT: ICD-10-CM

## 2025-06-24 DIAGNOSIS — I10 PRIMARY HYPERTENSION: ICD-10-CM

## 2025-06-24 DIAGNOSIS — N18.6 ESRD ON DIALYSIS (HCC): ICD-10-CM

## 2025-06-24 PROCEDURE — 99213 OFFICE O/P EST LOW 20 MIN: CPT | Performed by: PODIATRIST

## 2025-06-24 PROCEDURE — 11055 PARING/CUTG B9 HYPRKER LES 1: CPT | Performed by: PODIATRIST

## 2025-06-24 PROCEDURE — 99214 OFFICE O/P EST MOD 30 MIN: CPT | Performed by: INTERNAL MEDICINE

## 2025-06-24 PROCEDURE — 11721 DEBRIDE NAIL 6 OR MORE: CPT | Performed by: PODIATRIST

## 2025-06-24 RX ORDER — METOPROLOL SUCCINATE 25 MG/1
12.5 TABLET, EXTENDED RELEASE ORAL DAILY
COMMUNITY

## 2025-06-24 NOTE — TELEPHONE ENCOUNTER
Called patient's son, Sam, to offer a nurse visit to check on his dad after his recent ED visit for a fall. Explanation for visit given.  Sam declines at this time stating all imaging came back fine, bleeding at laceration sites is controlled, and his dad is doing well.    Sam asked that a nurse visit be scheduled after Asad's discharge from having the TAVR procedure in July.  Will keep an eye on the chart for patient's discharge.  Sam stated he will also call us when his dad returns home to schedule this    All questions and concerns were addressed.  Patient was appreciative of the call.

## 2025-06-24 NOTE — PROGRESS NOTES
[facility]    Name:Asad Galloway   DOS: 6/24/2025     Chief Complaint:   Chief Complaint   Patient presents with    Follow-up     3 months, no complaints         HISTORY OF PRESENT ILLNESS:    HPI:  Asad Galloway is a 65 y.o. male. He  has a past medical history of Cerebrovascular accident (CVA) due to thrombosis of left middle cerebral artery (HCC) (07/29/2018), Chronic kidney disease, Coronary artery disease, Diabetes mellitus ([facility]), Dialysis patient ([facility]), Fistula, GERD (gastroesophageal reflux disease), History of coronary artery stent placement, Hypercholesteremia, Hyperlipidemia, Hypertension, Infectious viral hepatitis, Limb alert care status, Neuropathy, Nonhealing ulcer of heel ([facility]) (04/25/2023), Obesity, Osteomyelitis ([facility]), Penetrating foot wound, left, initial encounter (01/19/2022), PVC's (premature ventricular contractions), Stroke ([facility]), TIA (transient ischemic attack) (10/28/2018), Ulcer of left heel, limited to breakdown of skin ([facility]) (06/13/2024), Wears dentures, and Wears glasses.    He is being evaluated via Telemedicine in follow up.      Active issues:  Ischemic cardiomyopathy  Chronic HF with mid-range EF (partially recovered to 45-50%) - Lowest EF 25% 1/30/2023.  Complex coronary artery disease - S/P multiple stents with history of in-stent stenosis and thrombosis on Plavix. Also with reported history of stent complications on Ticagrelor, although records are not available to support that. Has been maintained on Prasugrel despite history of CVA.   Moderate aortic stenosis   ESRD on HD - History of angina limiting dialysis sessions before his beta blocker was optimized.   History of DVT - Now chronically on Eliquis.   CVA     I first met Mr. Galloway in January 2023. He was admitted January 29, 2023. Prior to this, he had historically followed with a Cardiologist in [place] at [facility]. He was admitted in 1/2023 due to sudden onset of shortness of breath also with chest  pressure/pain.  He was in acute respiratory failure requiring BiPAP in the ED and was eventually transferred to the ICU for management of pulmonary edema. He underwent volume removal with renal replacement therapy. He was also found to have high sensitivity troponins at that time. An echocardiogram at that time showed reduction in LVEF to 25 to 30%.  Last known EF prior to this was reportedly normal.  He was transferred to our [facility] to undergo cardiac catheterization on 2/1/23, and was found to have progression of coronary artery disease compared to cardiac catheterization in May 2022.  He had in-stent stenosis of the LAD, for which he received PCI and placement of a ADE. He was also found to have an occluded OM stent (placed 5/2022), which could not be wired thus unable to revascularize.  Also with occlusion of the RPAV branch. He was initiated on medical therapy for heart failure, and discharged with a LifeVest. Subsequent echocardiogram showed improvement in LVEF with echo in February 2023 at [facility] showing EF of 35 to 40%.  Repeat echo within our network on 4/24/2023 showed EF of 35%. He was referred EP for ICD evaluation, but this was ultimately deferred due to high risk of infection in the setting of hemodialysis and possible immunosuppression when he gets renal transplant. Lower extremity osteomyelitis also raised concern for a device infection.     GDMT presently consists of Toprol XL 12.5mg QD.     EKG 7/17/2024  Sinus rhythm with 1st degree A-V block  Right bundle branch block ( ms)     Cardiac Catheterization 2/1/23    1st Mrg lesion is 100% stenosed.    RPAV lesion is 100% stenosed.    Mid LAD lesion is 80% stenosed.    RPDA lesion is 60% stenosed.    The angioplasty was pre-stent angioplasty.    The angioplasty was pre-stent angioplasty.     Multivessel CAD with marked progression since the prior angiographic study in May 2022.  Stents placed in the mid LAD  exhibited significant discrete and-stent restenosis.  The first OM branch, stented in 5/22, has become occluded.  The distal RCA beyond the PDA has akso become occluded.  LAD in--stent restenosis was interrogated by IVUS imaging and successfully treated with placement of a 4.0x13mm Orsiro ADE.  An attempt was made to wire the occluded OM branch, but the wire could not be advanced beyond the stent in mid vessel.      Nuclear Stress 4/14/2022    Stress ECG: A pharmacological stress test was performed using regadenoson. The patient experienced no angina during the test.    Stress ECG: The stress ECG is negative for ischemia after pharmacologic stress.    Perfusion: There is a left ventricular perfusion defect that is medium in size with severe reduction in uptake present in the basal to mid inferior and inferolateral location(s) consistent with prior scar.    Stress Function: Left ventricular function post-stress is normal. Post-stress ejection fraction is 46 %. There is a defect in the basal to mid inferior and inferolateral location(s).    Stress Combined Conclusion: Findings are consistent with inferolateral infarction. There is mild reversibility to this defect, suggesting a small amount of mona-infarct ischemia in the affected territories.     Today, he is being evaluated in follow-up via telemedicine. A video visit was attempted, but not possible due to technical difficulties and therefore the encounter took place over the phone. He tells me that he has been doing very well overall, with no active cardiopulmonary complaints.  Has been following also with a cardiac team in [place], and has seen a structural cardiologist there.  He is presently undergoing TAVR evaluation so that he may receive a TAVR prosthesis for his moderate aortic stenosis prior to being enlisted for renal transplant.  This workup overall has been recommended by his transplant team, due to concerns that he may potentially risk his future  transplant kidney if he were to undergo TAVR in the future.     Recently admitted in [place], underwent PCI to the proximal LAD via drug-coated balloon angioplasty of in-stent restenosis of his prior proximal LAD stent.  Effient dose was increased from 5 mg daily to 10 mg daily.  Noted to have an LVEF of 35% on transthoracic echo at that time in January 2025, subsequent echo in March shows a similar LVEF.  He has been off of metoprolol Entresto since stenting due to hypotension noted on hemodialysis.    He was admitted to our [facility] between May 17 and May 23 of this year for an episode of unresponsiveness due to hypotension. He had recently been switched to a trial of home dialysis, and seemingly became hypotensive in this process resulting in his hospitalization. He required intubation upon arrival, was treated with vasopressors shortly as well as antibiotics for what was considered to be a pneumonia on imaging. Repeat echo in the hospital demonstrated persistent moderate to severe reduction in LV systolic function, estimated at 25% although my personal review of the images closer to 30-35% and very similar to his prior echo from 1 month before.     TAVR scheduled July 1st with Dr. Dipti John     He is scheduled for EP follow-up with my colleague Dr. Weber on 8 to discuss ICD implantation.  He is presently wearing a LifeVest./8/25      ROS    ROS: Pertinent positives and negatives as described in History of Present Illness. Remainder of a 14 point review of systems was negative.     Allergies[1]     Medications Ordered Prior to Encounter[2]    Past Medical History[3]    Past Surgical History[4]    Family History[5]    Social History     Socioeconomic History    Marital status: /Civil Union     Spouse name: Not on file    Number of children: Not on file    Years of education: Not on file    Highest education level: Not asked   Occupational History    Occupation: disabled   Tobacco Use    Smoking  status: Never    Smokeless tobacco: Never   Vaping Use    Vaping status: Never Used   Substance and Sexual Activity    Alcohol use: Never    Drug use: No    Sexual activity: Not Currently     Partners: Female     Comment: defer   Other Topics Concern    Not on file   Social History Narrative    Daily caffeine consumption 2-3 servings a day     Social Drivers of Health     Financial Resource Strain: Patient Declined (7/13/2023)    Overall Financial Resource Strain (CARDIA)     Difficulty of Paying Living Expenses: Patient declined   Food Insecurity: No Food Insecurity (5/22/2025)    Nursing - Inadequate Food Risk Classification     Worried About Running Out of Food in the Last Year: Never true     Ran Out of Food in the Last Year: Never true     Ran Out of Food in the Last Year: Never true   Transportation Needs: No Transportation Needs (5/28/2025)    OASIS : Transportation     Lack of Transportation (Medical): No     Lack of Transportation (Non-Medical): No     Patient Unable or Declines to Respond: No   Physical Activity: Not on file   Stress: No Stress Concern Present (1/18/2019)    [employer] [place] [employer] - Occupational Stress Questionnaire     Feeling of Stress : Only a little   Social Connections: Unknown (1/18/2019)    Social Connection and Isolation Panel     Frequency of Communication with Friends and Family: Not on file     Frequency of Social Gatherings with Friends and Family: Not on file     Attends [employer]al Services: Not on file     Active Member of Clubs or Organizations: Not on file     Attends Club or Organization Meetings: Not on file     Marital Status:    Intimate Partner Violence: Unknown (5/22/2025)    Nursing IPS     Feels Physically and Emotionally Safe: Not on file     Physically Hurt by Someone: Not on file     Humiliated or Emotionally Abused by Someone: Not on file     Physically Hurt by Someone: No     Hurt or Threatened by Someone: No   Housing Stability:  "Unknown (5/22/2025)    Nursing: Inadequate Housing Risk Classification     Has Housing: Not on file     Worried About Losing Housing: Not on file     Unable to Get Utilities: Not on file     Unable to Pay for Housing in the Last Year: No     Has Housing: No       OBJECTIVE:    /55   Pulse 74   Wt 91 kg (200 lb 9.9 oz)   BMI 29.63 kg/m²      BP Readings from Last 3 Encounters:   06/24/25 104/55   06/22/25 115/55   06/19/25 91/54       Wt Readings from Last 3 Encounters:   06/24/25 91 kg (200 lb 9.9 oz)   06/19/25 92 kg (202 lb 13.2 oz)   05/27/25 91.6 kg (202 lb)         Physical Exam  Telemedicine no exam                                                    LABS:  Lab Results   Component Value Date    GLUCOSE 152 (H) 05/17/2025    BUN 42 (H) 05/23/2025    CREATININE 7.10 (H) 05/23/2025    CALCIUM 8.7 05/23/2025     08/10/2016    K 4.4 05/23/2025    CO2 29 05/23/2025    CL 96 05/23/2025    ALKPHOS 116 (H) 05/21/2025    BILITOT 0.6 08/10/2016    PROT 6.7 08/10/2016    AST 7 (L) 05/21/2025    ALT 5 (L) 05/21/2025    ANIONGAP 9 01/03/2016        Lab Results   Component Value Date    WBC 4.87 06/19/2025    HGB 9.5 (L) 06/19/2025    HCT 32.1 (L) 06/19/2025     (H) 06/19/2025    PLT 78 (L) 06/19/2025       Lab Results   Component Value Date    CHOL 203 (H) 08/10/2016    HDL 30 (L) 01/30/2023    LDLCALC 21 01/30/2023    TRIG 41 05/17/2025       Lab Results   Component Value Date    HGBA1C 5.4 04/10/2025       No results found for: \"TSH\"        ASSESSMENT/PLAN:  Diagnoses and all orders for this visit:    CAD, multiple vessel    Status post insertion of drug eluting coronary artery stent    Ischemic cardiomyopathy    Chronic systolic heart failure (HCC)    HFrEF (heart failure with reduced ejection fraction) (HCC)    ESRD on dialysis (HCC)    Nonrheumatic aortic valve stenosis    Primary hypertension    Other orders  -     metoprolol succinate (TOPROL-XL) 25 mg 24 hr tablet; Take 12.5 mg by mouth " "daily    65-year-old male presenting for follow-up evaluation.  He is stable overall from a cardiovascular standpoint.  We spoke at length in regards to the importance of dietary discretion, avoidance of foods like [employer], Chinese food which are high in salt.    Plan:  Continue Eliquis 2.5 mg twice daily  Continue aspirin 81 mg once daily.  Continue Effient 10 mg daily  Continue atorvastatin 40 mg once daily  Increase Toprol-XL back to 25 mg daily  Continue midodrine 3 times daily  Follow-up with EP as scheduled 8/8/2025 to discuss ICD candidacy.  Will follow-up with me at the end of July as already scheduled.      Britney Tan MD [facility]      Portions of the record may have been created with voice recognition software. Occasional wrong word or \"sound alike\" substitutions may have occurred due to the inherent limitations of voice recognition software. Please review the chart carefully and recognize, using context, where substitutions/typographical errors may have occurred.       Administrative Statements   Encounter provider Britney Tan MD    The Patient is located at Home and in the following state in which I hold an active license [place].    The patient was identified by name and date of birth. Asad Galloway was informed that this is a telemedicine visit and that the visit is being conducted through Telephone.  My office door was closed. No one else was in the room.  He acknowledged consent and understanding of privacy and security of the video platform. The patient has agreed to participate and understands they can discontinue the visit at any time.    I have spent a total time of 22 minutes in caring for this patient on the day of the visit/encounter including Instructions for management, Patient and family education, Importance of tx compliance, Counseling / Coordination of care, Documenting in the medical record, and Obtaining or reviewing history  , not including the time spent for establishing the " audio/video connection.           [1] No Known Allergies  [2]   Current Outpatient Medications on File Prior to Visit   Medication Sig Dispense Refill    apixaban (Eliquis) 2.5 mg Take 1 tablet (2.5 mg total) by mouth 2 (two) times a day 60 tablet 3    aspirin 81 mg chewable tablet Chew 1 tablet in the morning.      atorvastatin (LIPITOR) 40 mg tablet Take 1 tablet (40 mg total) by mouth daily with dinner 90 tablet 3    metoprolol succinate (TOPROL-XL) 25 mg 24 hr tablet Take 12.5 mg by mouth daily      midodrine (PROAMATINE) 5 mg tablet Take 3 tablets (15 mg total) by mouth 3 (three) times a day before meals 810 tablet 0    prasugrel (EFFIENT) tablet Take 1 tablet (10 mg total) by mouth daily 90 tablet 3    Vitamin D3 1.25 MG (65501 UT) capsule TAKE 1 CAPSULE BY MOUTH ONE TIME PER WEEK 12 capsule 4     No current facility-administered medications on file prior to visit.   [3]   Past Medical History:  Diagnosis Date    Cerebrovascular accident (CVA) due to thrombosis of left middle cerebral artery (HCC) 07/29/2018    Chronic kidney disease     Coronary artery disease     Diabetes mellitus (HCC)     not on meds    Dialysis patient ([facility])     M-W-F    Fistula     left upper arm for hemodialyis    GERD (gastroesophageal reflux disease)     History of coronary artery stent placement     x3    Hypercholesteremia     Hyperlipidemia     Hypertension     Infectious viral hepatitis     B as child    Limb alert care status     no BP/IV left arm    Neuropathy     Nonhealing ulcer of heel ([facility]) 04/25/2023    Obesity     Osteomyelitis ([facility])     last assessed 11/4/16-per son not currently    Penetrating foot wound, left, initial encounter 01/19/2022    PVC's (premature ventricular contractions)     sees  Cardio    Stroke ([facility])     last weeof July 2018 [facility]    TIA (transient ischemic attack) 10/28/2018    Ulcer of left heel, limited to breakdown of skin ([facility]) 06/13/2024    Wears dentures     Wears glasses   "  [4]   Past Surgical History:  Procedure Laterality Date    ABDOMINAL SURGERY      CARDIAC CATHETERIZATION N/A 05/02/2022    Procedure: Cardiac Coronary Angiogram;  Surgeon: Sam Davis MD;  [facility];  Service: Cardiology    CARDIAC CATHETERIZATION N/A 05/02/2022    Procedure: Cardiac pci;  Surgeon: Sam Davis MD;  [facility];  Service: Cardiology    CARDIAC CATHETERIZATION  02/01/2023    Procedure: Cardiac catheterization;  Surgeon: Sam Davis MD;  [facility]: [facility];  Service: Cardiology    CARDIAC CATHETERIZATION N/A 02/01/2023    Procedure: Cardiac pci;  Surgeon: Sam Davis MD;  [facility];  Service: Cardiology    CARDIAC CATHETERIZATION N/A 02/01/2023    Procedure: Cardiac Coronary Angiogram;  Surgeon: Sam Davis MD;  [facility];  Service: Cardiology    CARDIAC CATHETERIZATION N/A 02/01/2023    Procedure: Cardiac other-IVUS;  Surgeon: Sam Davis MD;  [facility];  Service: Cardiology    CHOLECYSTECTOMY      Percutaneous    COLONOSCOPY      CORONARY ANGIOPLASTY WITH STENT PLACEMENT      CYSTOSCOPY      HEMODIALYSIS ADULT  1/22/2024    HEMODIALYSIS ADULT  01/24/2024    HEMODIALYSIS ADULT  5/19/2025    IR LOWER EXTREMITY ANGIOGRAM  9/29/2023    IR LOWER EXTREMITY ANGIOGRAM  02/26/2024    OTHER SURGICAL HISTORY      \"stimulator to control bowel movements\"    PA ESOPHAGOGASTRODUODENOSCOPY TRANSORAL DIAGNOSTIC N/A 09/27/2016    Procedure: ESOPHAGOGASTRODUODENOSCOPY (EGD);  Surgeon: Adele Rowe MD;  Location: [facility] GI [facility];  Service: Gastroenterology    PA LAPAROSCOPY SURG CHOLECYSTECTOMY N/A 02/29/2016    Procedure: LAPAROSCOPIC CHOLECYSTECTOMY ;  Surgeon: Cliff Roman DO;  Location: [facility] Main [facility];  Service: General    PA SLCTV CATHJ 3RD+ ORD SLCTV ABDL PEL/LXTR BRNCH Left 9/29/2023    Procedure: Left leg angiogram with intervention;  Surgeon: Michelle Galvan MD;  Location: [facility];  Service: " "Vascular    DC SLCTV CATHJ 3RD+ ORD SLCTV ABDL PEL/LXTR BRNCH Left 02/26/2024    Procedure: Left leg angiogram antegrade access, left popliteal angioplasty;  Surgeon: Michelle Galvan MD;  Location: [facility];  Service: Vascular    ROTATOR CUFF REPAIR Right     SPINAL CORD STIMULATOR IMPLANT      \"Medtronic interstim model # 3058- in lower back to control bowel movements-currently turned off-battery is dead\"    TOE AMPUTATION Right 10/28/2016    Procedure: 3RD TOE AMPUTATION ;  Surgeon: Anjel Salas DPM;  Location: [facility];  Service:    [5]   Family History  Problem Relation Name Age of Onset    Leukemia Mother      Liver disease Mother      Lung cancer Mother          heavy smoker - 3 ppd    Heart disease Father Son     Liver disease Father Son     Diabetes type I Father Son     Multiple myeloma Sister      Breast cancer Sister      Urolithiasis Family      Alcohol abuse Neg Hx      Depression Neg Hx      Drug abuse Neg Hx      Substance Abuse Neg Hx      Mental illness Neg Hx       "

## 2025-06-24 NOTE — PROGRESS NOTES
Patient ID: Asad Galloway is a 65 y.o. male Date of Birth 1960     Assessment:    No problem-specific Assessment & Plan notes found for this encounter.       Diagnoses and all orders for this visit:    Diabetic polyneuropathy associated with type 2 diabetes mellitus (HCC)    Peripheral arterial disease (HCC)    Ulcer of toe of right foot, with fat layer exposed (HCC)  -     Post Op Shoe    Absence of toe of right foot (HCC)  -     Post Op Shoe    Onychomycosis    Acquired keratoderma        Procedures    Plan:  Reviewed medical records.  Reviewed the notes from wound care.  Discussed his condition and prognosis.    Instructed skin care and protection.  Instructed him to use Prevalon boot at night.  3. Educated disease prevention and risks related to diabetes.  Educated proper daily foot care and exam.  Instructed proper skin care / protection and footwear.  Instructed to identify any signs of infection and related foot problem.    He has new ulcer in right great toe.  Pt to use Santyl daily.    Rx: Surgical shoe for off-loading.  Will see him at wound center for further care.   The patient will return in 3 months for diabetic foot exam.      Procedure: All mycotic toenails were reduced and debrided in length, width, and girth using a nail nipper and dremel.  All hyperkeratotic skin lesion(s) were sharply pared with a scalpel with no bleeding or evidence of ulceration.  Patient tolerated procedure(s) well without complications.           Subjective:          HPI    The patient presents for diabetic foot exam.  He has high risk diabetic foot with history of DFU, toe amputation, PAD, and DPN.  He has ulcer in right great toe.  His wife has been putting neosporin.  No significant pain.  He presents with old DM shoes.        The following portions of the patient's history were reviewed and updated as appropriate: allergies, current medications, past family history, past medical history, past social history, past  surgical history, and problem list.      PAST MEDICAL HISTORY:  Past Medical History:   Diagnosis Date    Cerebrovascular accident (CVA) due to thrombosis of left middle cerebral artery (HCC) 07/29/2018    Chronic kidney disease     Coronary artery disease     Diabetes mellitus (HCC)     not on meds    Dialysis patient ([facility])     M-W-F    Fistula     left upper arm for hemodialyis    GERD (gastroesophageal reflux disease)     History of coronary artery stent placement     x3    Hypercholesteremia     Hyperlipidemia     Hypertension     Infectious viral hepatitis     B as child    Limb alert care status     no BP/IV left arm    Neuropathy     Nonhealing ulcer of heel (HCC) 04/25/2023    Obesity     Osteomyelitis ([facility])     last assessed 11/4/16-per son not currently    Penetrating foot wound, left, initial encounter 01/19/2022    PVC's (premature ventricular contractions)     sees  Cardio    Stroke ([facility])     last weeof July 2018 [facility]    TIA (transient ischemic attack) 10/28/2018    Ulcer of left heel, limited to breakdown of skin ([facility]) 06/13/2024    Wears dentures     Wears glasses        PAST SURGICAL HISTORY:  Past Surgical History:   Procedure Laterality Date    ABDOMINAL SURGERY      CARDIAC CATHETERIZATION N/A 05/02/2022    Procedure: Cardiac Coronary Angiogram;  Surgeon: Sam Davis MD;  [facility];  Service: Cardiology    CARDIAC CATHETERIZATION N/A 05/02/2022    Procedure: Cardiac pci;  Surgeon: Sam Davis MD;  [facility];  Service: Cardiology    CARDIAC CATHETERIZATION  02/01/2023    Procedure: Cardiac catheterization;  Surgeon: Sam Davis MD;  [facility];  Service: Cardiology    CARDIAC CATHETERIZATION N/A 02/01/2023    Procedure: Cardiac pci;  Surgeon: Sam Davis MD;  [facility];  Service: Cardiology    CARDIAC CATHETERIZATION N/A 02/01/2023    Procedure: Cardiac Coronary Angiogram;  Surgeon: Sam  "MD Ryan;  [facility];  Service: Cardiology    CARDIAC CATHETERIZATION N/A 02/01/2023    Procedure: Cardiac other-IVUS;  Surgeon: Sam Davis MD;  [facility];  Service: Cardiology    CHOLECYSTECTOMY      Percutaneous    COLONOSCOPY      CORONARY ANGIOPLASTY WITH STENT PLACEMENT      CYSTOSCOPY      HEMODIALYSIS ADULT  1/22/2024    HEMODIALYSIS ADULT  01/24/2024    HEMODIALYSIS ADULT  5/19/2025    IR LOWER EXTREMITY ANGIOGRAM  9/29/2023    IR LOWER EXTREMITY ANGIOGRAM  02/26/2024    OTHER SURGICAL HISTORY      \"stimulator to control bowel movements\"    DC ESOPHAGOGASTRODUODENOSCOPY TRANSORAL DIAGNOSTIC N/A 09/27/2016    Procedure: ESOPHAGOGASTRODUODENOSCOPY (EGD);  Surgeon: Adele Rowe MD;  Location: [facility];  Service: Gastroenterology    DC LAPAROSCOPY SURG CHOLECYSTECTOMY N/A 02/29/2016    Procedure: LAPAROSCOPIC CHOLECYSTECTOMY ;  Surgeon: Cliff Roman DO;  Location: [facility];  Service: General    DC SLCTV CATHJ 3RD+ ORD SLCTV ABDL PEL/LXTR BRNCH Left 9/29/2023    Procedure: Left leg angiogram with intervention;  Surgeon: Michelle Galvan MD;  Location: [facility];  Service: Vascular    DC SLCTV CATHJ 3RD+ ORD SLCTV ABDL PEL/LXTR BRNCH Left 02/26/2024    Procedure: Left leg angiogram antegrade access, left popliteal angioplasty;  Surgeon: Michelle Galvan MD;  Location: [facility];  Service: Vascular    ROTATOR CUFF REPAIR Right     SPINAL CORD STIMULATOR IMPLANT      \"Medtronic interstim model # 3058- in lower back to control bowel movements-currently turned off-battery is dead\"    TOE AMPUTATION Right 10/28/2016    Procedure: 3RD TOE AMPUTATION ;  Surgeon: Anjel Salas DPM;  Location[facility];  Service:         ALLERGIES:  Patient has no known allergies.    MEDICATIONS:  Current Outpatient Medications   Medication Sig Dispense Refill    apixaban (Eliquis) 2.5 mg Take 1 tablet (2.5 mg total) by mouth 2 (two) times a day 60 tablet 3    aspirin 81 " mg chewable tablet Chew 1 tablet in the morning.      atorvastatin (LIPITOR) 40 mg tablet Take 1 tablet (40 mg total) by mouth daily with dinner 90 tablet 3    metoprolol succinate (TOPROL-XL) 25 mg 24 hr tablet Take 12.5 mg by mouth daily      midodrine (PROAMATINE) 5 mg tablet Take 3 tablets (15 mg total) by mouth 3 (three) times a day before meals 810 tablet 0    prasugrel (EFFIENT) tablet Take 1 tablet (10 mg total) by mouth daily 90 tablet 3    Vitamin D3 1.25 MG (89664 UT) capsule TAKE 1 CAPSULE BY MOUTH ONE TIME PER WEEK 12 capsule 4     No current facility-administered medications for this visit.       SOCIAL HISTORY:  Social History     Socioeconomic History    Marital status: /Civil Union    Highest education level: Not asked   Occupational History    Occupation: disabled   Tobacco Use    Smoking status: Never    Smokeless tobacco: Never   Vaping Use    Vaping status: Never Used   Substance and Sexual Activity    Alcohol use: Never    Drug use: No    Sexual activity: Not Currently     Partners: Female     Comment: defer   Social History Narrative    Daily caffeine consumption 2-3 servings a day     Social Drivers of Health     Financial Resource Strain: Patient Declined (7/13/2023)    Overall Financial Resource Strain (CARDIA)     Difficulty of Paying Living Expenses: Patient declined   Food Insecurity: No Food Insecurity (5/22/2025)    Nursing - Inadequate Food Risk Classification     Worried About Running Out of Food in the Last Year: Never true     Ran Out of Food in the Last Year: Never true     Ran Out of Food in the Last Year: Never true   Transportation Needs: No Transportation Needs (5/28/2025)    OASIS : Transportation     Lack of Transportation (Medical): No     Lack of Transportation (Non-Medical): No     Patient Unable or Declines to Respond: No   Stress: No Stress Concern Present (1/18/2019)    Citizen of [place] [employer] - Occupational Stress Questionnaire     Feeling  "of Stress : Only a little   Social Connections: Unknown (1/18/2019)    Social Connection and Isolation Panel     Marital Status:    Intimate Partner Violence: Unknown (5/22/2025)    Nursing IPS     Physically Hurt by Someone: No     Hurt or Threatened by Someone: No   Housing Stability: Unknown (5/22/2025)    Nursing: Inadequate Housing Risk Classification     Unable to Pay for Housing in the Last Year: No     Has Housing: No        Review of Systems   Constitutional:  Negative for chills and fever.   Respiratory:  Negative for cough and shortness of breath.    Cardiovascular:  Negative for chest pain.   Gastrointestinal:  Negative for nausea and vomiting.   Neurological:  Positive for numbness. Negative for weakness.         Objective:              Ht 5' 9\" (1.753 m)   Wt 90.7 kg (200 lb)   BMI 29.53 kg/m²     Physical Exam  Nursing note reviewed.   Constitutional:       General: He is not in acute distress.     Appearance: He is not toxic-appearing or diaphoretic.     Cardiovascular:      Rate and Rhythm: Normal rate and regular rhythm.      Pulses:           Dorsalis pedis pulses are 0 on the right side and 1+ on the left side.        Posterior tibial pulses are 0 on the right side and 0 on the left side.   Pulmonary:      Effort: Pulmonary effort is normal. No respiratory distress.     Musculoskeletal:         General: Deformity present. No signs of injury.      Right lower leg: Edema present.      Left lower leg: Edema present.      Right foot: No Charcot foot or foot drop.      Left foot: No Charcot foot or foot drop.      Comments: S/P partial amp right 3rd toe.   Feet:      Right foot:      Skin integrity: Dry skin present. No ulcer.      Left foot:      Skin integrity: Dry skin present. No ulcer.     Skin:     General: Skin is warm and dry.      Capillary Refill: Capillary refill takes less than 2 seconds.      Coloration: Skin is not cyanotic or mottled.      Findings: No abscess or erythema.      " Nails: There is no clubbing.      Comments: Thick, mycotic nails X 7 with onycholysis and discoloration.  HPK in left heel.  He has Oliveros 1 ulcer in right great toe.  Wound bed is fibrous.  No deep probing.  No redness or purulence.     Neurological:      General: No focal deficit present.      Mental Status: He is alert and oriented to person, place, and time.      Cranial Nerves: No cranial nerve deficit.      Sensory: Sensory deficit present.      Motor: No weakness.      Coordination: Coordination normal.     Psychiatric:         Mood and Affect: Mood normal.         Behavior: Behavior normal.         Thought Content: Thought content normal.         Judgment: Judgment normal.

## 2025-06-26 ENCOUNTER — EVALUATION (OUTPATIENT)
Facility: CLINIC | Age: 65
End: 2025-06-26
Payer: MEDICARE

## 2025-06-26 ENCOUNTER — OFFICE VISIT (OUTPATIENT)
Dept: WOUND CARE | Facility: HOSPITAL | Age: 65
End: 2025-06-26
Payer: MEDICARE

## 2025-06-26 ENCOUNTER — EVALUATION (OUTPATIENT)
Facility: CLINIC | Age: 65
End: 2025-06-26
Attending: PHYSICIAN ASSISTANT
Payer: MEDICARE

## 2025-06-26 VITALS
WEIGHT: 204 LBS | TEMPERATURE: 96.5 F | RESPIRATION RATE: 18 BRPM | DIASTOLIC BLOOD PRESSURE: 53 MMHG | BODY MASS INDEX: 30.92 KG/M2 | HEART RATE: 71 BPM | HEIGHT: 68 IN | SYSTOLIC BLOOD PRESSURE: 109 MMHG

## 2025-06-26 DIAGNOSIS — E11.40 TYPE 2 DIABETES MELLITUS WITH DIABETIC NEUROPATHY, UNSPECIFIED WHETHER LONG TERM INSULIN USE (HCC): ICD-10-CM

## 2025-06-26 DIAGNOSIS — L97.512 ULCER OF TOE OF RIGHT FOOT, WITH FAT LAYER EXPOSED (HCC): Primary | ICD-10-CM

## 2025-06-26 DIAGNOSIS — Z86.73 H/O: STROKE: Primary | ICD-10-CM

## 2025-06-26 DIAGNOSIS — I69.319 COGNITIVE DEFICITS FOLLOWING CEREBRAL INFARCTION: ICD-10-CM

## 2025-06-26 DIAGNOSIS — Z78.9 SELF-CARE DEFICIT: ICD-10-CM

## 2025-06-26 DIAGNOSIS — R26.2 DIFFICULTY WALKING: Primary | ICD-10-CM

## 2025-06-26 DIAGNOSIS — I73.9 PERIPHERAL ARTERIAL DISEASE (HCC): ICD-10-CM

## 2025-06-26 DIAGNOSIS — R53.1 WEAKNESS GENERALIZED: ICD-10-CM

## 2025-06-26 PROCEDURE — 97597 DBRDMT OPN WND 1ST 20 CM/<: CPT | Performed by: PODIATRIST

## 2025-06-26 PROCEDURE — 97530 THERAPEUTIC ACTIVITIES: CPT

## 2025-06-26 PROCEDURE — 97530 THERAPEUTIC ACTIVITIES: CPT | Performed by: PHYSICAL THERAPIST

## 2025-06-26 PROCEDURE — 99213 OFFICE O/P EST LOW 20 MIN: CPT | Performed by: PODIATRIST

## 2025-06-26 NOTE — PROGRESS NOTES
OCCUPATIONAL THERAPY RE-CERTIFICATION    Today's Date: 2025  Patient Name: Asad Galloway  : 1960  MRN: [id number]  Referring Provider: Mallorie Ulloa*  Dx: H/O: stroke [Z86.73]    SKILLED ANALYSIS:  Pt is a right hand dominant 64 year old male presenting to OP OT after 1 month of services.  Pt had taken a prolonged break from services 2* multiple hospitalizations.  During his one month of OT POC, pt has attended 6 treatment sessions, half of which he was minimally participatory 2* self limiting behaviors and agitation.  At times verbally aggressive towards therapist and family members Pt has demonstrated the physical and cognitive ability to participate in most ADLs with Roni-supervision, however continues to require totalA for most self care in home environment, again 2* self limiting behaviors.  Of note, pt is planned for TAVR next week at a hospital in [facility].  Pt/wife plans for him to return a few days post-op.  Based on assessments, pt demonstrating significant improvements with grasp strength, pinch strength, functional cognition.  While learned helplessness and slef limiting behaviors are the primary barrier to his independence with daily activities, his performance is also limited by generalized weakness, balance, cognitive deficits, activity tolerance and endurance.  He is also noted to have b/l hand weakness L>R.  Recommend OP OT 2x/wk with focus on increasing independence with daily activities, decreasing caregiver burden, bed mobility, functional cognition, and functional use of b/l UE.  Discussed with pt and wife, and both are in agreement.      Discussed with wife and pt that I anticipate that his self limiting behavior and learned helplessness is a primary limitation to his independence with daily activities.  Advised wife to trial setting up pt, supervising and encouraging pt to participate. Will require assist with washing buttocks, balance during ADLs, pull pants over hips.  Wife  "and pt both in agreement to trial prior to next session.  Also reiterated the importance of participation during sessions to make progress, as this has been a challenge so far.  Pt reports understanding and is in agreement.    Educated pt on charges of insurance, acknowledge understanding, and are in agreement.      PLAN OF CARE START: 5/28/25  PLAN OF CARE END: 8/28/25  FREQUENCY: 2x/week  PRECAUTIONS dialysis 3x/week; NO FOOD OR WATER CAN BE GIVEN TO PATIENT; scared of dogs; do not take BP in L UE    Subjective: \"I do it myself\"- in regards to most questions regarding ADL routine.  Per wife, he is totalA for most ADLs.  She reports being fatigued and having back pain 2* caregiver burden.    Occupational Profile  Pt lives with at home with his wife and two kids (23 and 28), other two kids are  and live on their own. Pt lives in a bi-level home. There are 5 steps down and 7 steps up. Pt resides downstairs. They have a walk in shower, shower bench, grab bars, handles on toilet, and railing to assist with getting into and out of his standard bed but more recently has been sleeping in a hospital bed. Pt is a retired , he has been retired since his stroke. Pt is not driving. Pt's wife works during the day and pt is alone during that time.  Pt reports he primarily watches cartoons during that time. He was recently d/c from  OT and PT.  He goes to HD 3x/wk.  Currently presenting with LifeVest, and is planned for TAVR in early July.      Wife's report of pt's current ADL status:  -Requires max-totalA with all ADLs, except Roni for eating  -Requires heavy assist with bed mobility, functional transfers and mobility with use of rw      Pt's reports: (inaccurate)  Eating: set-up  Grooming: totalA for oral care, set-up for face washing  UB bathing: supervision   LB bathing: supervision  UB dressing: set-up   LB dressing: set-up  Toileting: reports he never urinates or has bowel movements  Bed mobility: " "assist from wife  Transfers: assist from wife  Mobility: assist with use of rw    No falls per pt    PATIENT GOAL: \"To do more for myself\"    HISTORY OF PRESENT ILLNESS:   Pt has h/o CVA in 2018 with residual functional and cognitive deficits.  He is well known to this OT and recently took a break from OP OT 2* multiple hospitalizations and subsequent rehab placement,  OT.      PMH:   Past Medical History:   Diagnosis Date    Cerebrovascular accident (CVA) due to thrombosis of left middle cerebral artery ([facility]) 07/29/2018    Chronic kidney disease     Coronary artery disease     Diabetes mellitus ([facility])     not on meds    Dialysis patient ([facility])     M-W-F    Fistula     left upper arm for hemodialyis    GERD (gastroesophageal reflux disease)     History of coronary artery stent placement     x3    Hypercholesteremia     Hyperlipidemia     Hypertension     Infectious viral hepatitis     B as child    Limb alert care status     no BP/IV left arm    Neuropathy     Nonhealing ulcer of heel ([facility]) 04/25/2023    Obesity     Osteomyelitis ([facility])     last assessed 11/4/16-per son not currently    Penetrating foot wound, left, initial encounter 01/19/2022    PVC's (premature ventricular contractions)     sees  Cardio    Stroke ([facility])     last weeof July 2018 [facility]    TIA (transient ischemic attack) 10/28/2018    Ulcer of left heel, limited to breakdown of skin ([facility]) 06/13/2024    Wears dentures     Wears glasses        Past Surgical Hx:   Past Surgical History:   Procedure Laterality Date    ABDOMINAL SURGERY      CARDIAC CATHETERIZATION N/A 05/02/2022    Procedure: Cardiac Coronary Angiogram;  Surgeon: Sam Davis MD;  [facility];  Service: Cardiology    CARDIAC CATHETERIZATION N/A 05/02/2022    Procedure: Cardiac pci;  Surgeon: Sam Davis MD;  [facility];  Service: Cardiology    CARDIAC CATHETERIZATION  02/01/2023    Procedure: Cardiac catheterization;  Surgeon: " "Sam Davis MD;  [facility];  Service: Cardiology    CARDIAC CATHETERIZATION N/A 02/01/2023    Procedure: Cardiac pci;  Surgeon: Sam Davis MD;  [facility];  Service: Cardiology    CARDIAC CATHETERIZATION N/A 02/01/2023    Procedure: Cardiac Coronary Angiogram;  Surgeon: Sam Davis MD;  [facility];  Service: Cardiology    CARDIAC CATHETERIZATION N/A 02/01/2023    Procedure: Cardiac other-IVUS;  Surgeon: Sam Davis MD;  [facility];  Service: Cardiology    CHOLECYSTECTOMY      Percutaneous    COLONOSCOPY      CORONARY ANGIOPLASTY WITH STENT PLACEMENT      CYSTOSCOPY      HEMODIALYSIS ADULT  1/22/2024    HEMODIALYSIS ADULT  01/24/2024    HEMODIALYSIS ADULT  5/19/2025    IR LOWER EXTREMITY ANGIOGRAM  9/29/2023    IR LOWER EXTREMITY ANGIOGRAM  02/26/2024    OTHER SURGICAL HISTORY      \"stimulator to control bowel movements\"    AL ESOPHAGOGASTRODUODENOSCOPY TRANSORAL DIAGNOSTIC N/A 09/27/2016    Procedure: ESOPHAGOGASTRODUODENOSCOPY (EGD);  Surgeon: Adele Rowe MD;  Location: [facility];  Service: Gastroenterology    AL LAPAROSCOPY SURG CHOLECYSTECTOMY N/A 02/29/2016    Procedure: LAPAROSCOPIC CHOLECYSTECTOMY ;  Surgeon: Cliff Roman DO;  Location: [facility];  Service: General    AL SLCTV CATHJ 3RD+ ORD SLCTV ABDL PEL/LXTR BRNCH Left 9/29/2023    Procedure: Left leg angiogram with intervention;  Surgeon: Michelle Galvan MD;  Location: [facility];  Service: Vascular    AL SLCTV CATHJ 3RD+ ORD SLCTV ABDL PEL/LXTR BRNCH Left 02/26/2024    Procedure: Left leg angiogram antegrade access, left popliteal angioplasty;  Surgeon: Michelle Galvan MD;  Location: [facility];  Service: Vascular    ROTATOR CUFF REPAIR Right     SPINAL CORD STIMULATOR IMPLANT      \"Medtronic interstim model # 3058- in lower back to control bowel movements-currently turned off-battery is dead\"    TOE AMPUTATION Right 10/28/2016    Procedure: 3RD TOE " AMPUTATION ;  Surgeon: Anjel Salas DPM;  Location: [facility];  Service:         Pain: FLACC 0    Objective    Upper Extremities:  Pt is right hand dominant                MARIFER: RUE: 34lb (2025: 30lb, 2025: 43lb) LUE: 17lb (2025: 12lb, 2025: 26lb)  The age norm is approximately 76-89 lbs, indicating decreased  strength.        Date: 25     Right 42 34lb 30lb 43lb     Left 15 17lb 12lb 26lb         Manual Muscle Testing:  Date: 25    R L Right Left   Shoulder flexion   4/5 4-/5   Shoulder abduction   4/5 4-/5   Shoulder IR   NA NA   Shoulder ER   NA NA   Elbow flexion   4/5 4-/5   Elbow extension   4/5 4-/5   Forearm supination   NA, ROM WFL NA, ROM WFL   Forearm pronation   NA, ROM WFL NA, ROM WFL   Wrist flexion   4-/5 4-/5                    Date: 25    Right Left  R L R L   2 point pinch 7 1 NA 7 1 8.5 1.5   3 point pinch 7 1.5 NA 5 1 7 1.5   Lateral pinch 9 4 NA 9 3 12 2.5     Range of Motion:   AROM: b/l WNL     Subluxation: none    Scapular winging: none    Spasticity: none     Finger to Nose Testing with Visual Occlusion (proprioception):  mildly dysmetric L UE    Finger to Nose Testing without Visual Occlusion: WNL    Monofilaments/sensory testing: WNL    Functional Cognition:  Highest level of education: High school    Víctor Cognitive Assessment Version 8.2 (MoCA V8.2)   Visuospatial/executive functioning:  3/5, didn't receive points for hands 2* lengths being the same but noted to be pointing to correct numbers  Namin/3  Memory: 1st trial:  , 2nd trial:    Attention/concentration: 2/2  List of letters: 1/  Serial Seven Subtraction:  2/3, 2 errors  Language/sentence repetition:  1/2  Language Fluency:  0/1, 9 words  Abstract/Correlational Thinkin/2  Delayed Recall:  0/5  Orientation:  4/6. Oriented to day, city,place and  year   Memory Index Score: 1/15      Date: 6/26/25 5/28/25 March 2025 Evals in 2024   Total score (including 1 additional point for education level) 15+1=16/30 12+1=13/30 9+1=10/30 Fluctuated between 12 and 16   MIS 1/15 1/15 5/15     Moderate neurocognitive deficits Moderate neurocognitive deficits Severe neurocognitive deficits Moderate neurocognitive deficits       MoCA Scoring        Normal: 26+         Mild Cognitive Impairment: 18-25          Moderate Cognitive Impairment: 10-17         Severe Cognitive Impairment: <10      NINE-HOLE PEG TEST: assesses dexterity/fine motor coordination. Alternative scoring: the number of pegs placed in 50 or 100 seconds can be recorded.      Date: 6/26/25 March 2025 Feb 2025   Right  Norms: 20.87 2:22 with 7 pegs dropped 1:13 to insert 2 pegs with multiple dropped 1:54 with 4 pegs dropped and 3x use of L UE   Left  Norms: 21.6 Pt unable to place any pegs.  Discontinued 2* pt frustration 26 seconds to insert 0 pegs with multiple dropped 3:06 with 11 pegs dropped 4x use of R UE     Pt demonstrates decreased FMC compared to norms for age/sex       FUNCTIONAL DEXTERITY TEST:    Date: 6/26/25 5/28/25 March 2025 Feb 2025   R 1:29 with 16/16 compensations and 1 peg dropped NA 2:00 with 16/16 compensations 1:24 with 16/16 compensations   L 30 seconds with 4/4 compensations NA 1:53 to complete 4 pegs with 4/4 compensations 1:42 with 3 pegs dropped, 16/16 compensations       Trail Making Part A and Part B:     Date: 6/26/25 5/28/25 Feb 2025       A  Norm: 33.22 seconds Unable to read print NA 2:33 with 6 cues for sequencing       B  NA unable           Contextual Memory Test: NOT RE-ASSESSED 6/26/25      Date:  5/28/25   Don socks Inc time and 1 VC for problem solving.  Performed in tailor sit Inc time in tailor sit   Doff socks Inc time and 1 VC for problem solving.  Performed in tailor sit Mildly inc time and set-up assist while seated EOM in tailor sit   Don shoes Requires assist to  adjust heel of surgical shoe Requires VC for which shoe goes on each foot, then able to complete with set-up   Doff shoes Requires 1 VC for how to remove surgical shoe L LE, however just began wearing it yesterday Mildly inc time and set-up assist while seated EOM in tailor sit       Bed mobility:    Date:  5/28/25   Sit-> sidelying  supervision and requires inc time.  C/o mild dizziness that resolves after ~45 seconds   Sidelying<>supine  supervision, inc time.  C/o significant dizziness and noted to have significant nystagmus.  Resolves after ~1 min   -Sidelying -> sitting EOM with modA for trunk mgmt   Sidelying-> sitting EOM  modA for trunk mgmt   STS  completed 5x, fluctuates between modA and CGA.  Requires cues for appropriate hand positioning and min cues for carry over of education.  Uncontrolled descent 3/5 attempts (not consecutive attempts)     GOALS:   Short Term Goals:  Pt will complete all UB self care tasks with set-up and supervision (excluding fasteners). Wife reports she thinks he is able, but won't  Pt will complete LB self care tasks with Roni (excluding fasteners) per wife report.Wife reports she thinks he is able, but won't  Pt will complete bed mobility with Roni.NA  Pt will demonstrate improved functional cognition by scoring at least 14/30 on MoCA for carry over with daily activities. ACHIEVED  Pt will inc b/l grasp strength by 3lb for carry over with functional activities. ACHIEVED        Long Term Goals: 8-12 weeks  Pt will complete LB self care tasks with supervision/set-up per wife report.  Pt will complete bed mobility with Jourdan.  Pt will demonstrate improved functional cognition by scoring at least 16/30 on MoCA for carry over with daily activities. ACHIEVED  Pt will inc b/l grasp strength by 6lb for carry over with functional activities.  Pt will inc b/l UE strength to at least 4+/5 in all planes for carry over with functional activities    OTHER PLANNED THERAPY INTERVENTIONS:    Supine, seated, and in stance neuro re-ed  Tricep AG  NMES/FES  FMC/prehension  Timed Trials  Manual tx  Hand to target  Sensory re-ed  Seated functional reach: crossing midline  Supine place and hold  WBearing strategies   Closed chain activities  Open chain activities  Internal and external memory aides  Multimatrix for saccades/ visual clutter/attention  Hypersensitivity strategies education  Multi-modal environment  Sustained/alternating/divided attention  Work stations with timed transitions  Temporal Awareness  Memory and mental manipulation  Auditory processing with immediate recall  Memory retention with immediate and delayed recall  Edu on cog/vision apps

## 2025-06-26 NOTE — PATIENT INSTRUCTIONS
Orders Placed This Encounter   Procedures    Wound cleansing and dressings Anterior;Right Toe D1, great     Right Great toe wound:    Ok to shower with wound uncovered. Recommend using unscented dove. Gently cleanse, rinse well, and pat dry. Apply new dressing immediately.  Do not leave open to air      Apply nickel thick amount of santyl to wound  Cover with gauze  Secure with tape  Change dressing daily         Elastic Tubular Stocking--size F     Tubular elastic bandage: Apply from base of toes to behind the knee. Apply in AM, may remove for sleep.    Avoid prolonged standing in one place.    Elevate leg(s) above the level of the heart when sitting or as much as possible.     Standing Status:   Future     Expiration Date:   7/10/2025

## 2025-06-26 NOTE — PROGRESS NOTES
PT Re-Evaluation          POC expires Unit limit Auth Expiration date PT/OT + Visit Limit? Co-Insurance   25   [place] primary, 30pcy secondary Yes                                            Today's date: 2025  Patient name: Asad Galloway  : 1960  MRN: [id number]  Referring provider: Mallorie Ulloa*  Dx:   Encounter Diagnosis     ICD-10-CM    1. Difficulty walking  R26.2       2. Weakness generalized  R53.1                     Assessment  Assessment details: Patient is a 64 y.o. male who presents to skilled PT for impaired balance, general deconditioning with multiple comorbidiites including hx of CVA, CKD on HD. Patient with multiple hospitalizations over the past few months, with most recent being ED visit last week for a fall (no major injury). He has been attending PT consistently 2x/week over past month. He has met 2 short-term goals so far. He is able to perform sit>stand with CG at times, but this is not consistent and he does require mod A at times as well. He remains high fall risk per 5x STS, TUG, Burger. He was unable to complete full 2MWT today due to reports of fatigue, but he did walk further distance than last month. He has been able to tolerate 6 min consecutively on Nustep. Pt's wife reports high caregiver burden as she assists him with all mobility and ADL's. Pt is scheduled for TAVR on  with plans to return to PT the following week. Pt and wife both verbalized understanding. Plan to continue outpatient PT with focus on transfers and ambulation, in order to decrease caregiver burden and maximize pt safety.       Please contact me if you have any questions or recommendations. Thank you for the referral and the opportunity to share in Asad Galloway's care.      Cut off score   All date taken from [facility] Neuro Section or Rehab Measures    BURGER test: 46/56                                              5 x STS  Test:  MDC: 6 points                                                  MDC: 2.3 seconds   age norms                                                                 Age Norms   60-69 year old = M: 55, F: 55                        60-69 year old: 11.4 seconds   70-79 year old = M 54,  F: 53                       70-79 year old: 12.6 seconds     80-89 year old = M53,   F: 50                       80-89 year old: 14.8 seconds      TUG test:                                                                     10 Meter Walk Test:  MDC: 4.14 seconds       MDC: .59 ft/sec  Cut off score for Falls                                                  Age Norms  > 13.5 seconds community dwelling adults                20-29; M: 4.56 ft/sec F: 4.62 ft/sec  > 32.2 Frail Elderly                                                     30-39: M 4.76 ft/sec  F: 4.68 ft/sec          40-49: M: 4.79 ft/sec  F: 4.62 ft/sec  6 Minute Walk Test      50-59: M: 4.76 ft/sec  F: 4.56 ft/sec  MDC: 190 feet       60-69: M: 4.56 ft/sec  F: 4.26 ft/sec  Age Norms       70-+    M: 4.36 ft/sec  F: 4.16 ft/sec  60-69:    M: 1876 F: 1765  70-79:    M: 1729 F: 1545    FGA:  80-89 +: M: 1368 F; 1286       MCID: 4 points            Geriatrics/ Community Dwelling Older Adults: </= 22/30 fall risk            Geriatrics/ Community Dwelling Older Adults: </= 20/30 unexplained falls in the next 6 months            Parkinsons: </= 18/30 fall risk      Patient verbalized understanding of POC          Impairments: Abnormal coordination, Abnormal gait, Abnormal muscle tone, Abnormal or restricted ROM, Activity intolerance, Impaired balance, Impaired physical strength, Lacks appropriate HEP, Poor posture, Poor body mechanics, Pain with function, Safety issue, Weight-bearing intolerance, Abnormal movement, Difficulty understanding, Abnormal muscle firing  Understanding of Dx/Px/POC: Excellent  Prognosis: Excellent    Patient verbalized understanding of POC.         Please  contact me if you have any questions or recommendations. Thank you for the referral and the opportunity to share in Asad Galloway's care.        Plan  Plan details: complete testing, balance and functional strength/endurance re-training  Patient would benefit from: PT Eval and Skilled PT  Planned modality interventions: Biofeedback, Cryotherapy, TENS, Thermotherapy  Planned therapy interventions: Abdominal trunk stabilization, ADL training, Balance, Balance/WB training, Breathing training, Body mechanics training, Coordination, Functional ROM exercises, Gait training, HEP, Joint Mobilization, Manual Therapy, Griggs taping, Motor coordination training, Neuromuscular re-education, Patient education, Postural training, Strengthening, Stretching, Therapeutic activities, Therapeutic exercises, Therapeutic training, Transfer training, Activity modification, Work reintegration  Frequency: 2-3x/week  Duration in weeks: 12  Plan of Care beginning date: 6/26/2025  Plan of Care expiration date: 12 weeks - 9/18/2025  Treatment plan discussed with: Patient       Goals  Short Term Goals (4 weeks):   - Patient will improve time on TUG by 2.9 seconds to facilitate improved safety in all ambulation- MET  - Patient will be independent in basic HEP 2-3 weeks- NOT MET  - Patient will improve 5xSTS score by 2.3 seconds to promote improved LE functional strength needed for ADLs - MET  - Patient will perform 6MWT to assess cardiovascular endurance and improve ability to ambulate for prolonged periods of time. - NOT MET  - Patient will perform sit to stand with min A. - NOT MET (inconsistent, fluctuates between min- mod A)      Long Term Goals (12 weeks):  - Patient will be independent in a comprehensive home exercise program  - Patient will improve gait speed by 0.18 m/s to improve safety with community ambulation  - Patient will improve BURGER by 6 points in order to improve static balance and reduce risk for falls  - Patient will  improve 6 Minute Walk Test score by 190 feet to promote improved cardiovascular endurance  - Patient will report 50% reduction in near falls in order to improve safety with functional tasks and reduce his risk for falls  - Patient will report going on walks at least 3 days per week to promote independence and improved cardiovascular endurance  - Patient will be able to ascend/descend stairs reciprocally with 1 UE assist to promote independence and safety with ADL  - Patient will report 50% reduction in near falls when ambulating on uneven terrain  - Patient will be able to perform sit to stand with CS.  - Patient will be able to ambulate 150 ft with SPC and CG.       Cut off score    All date taken from [facility] Neuro Section or Rehab Measures      Cabrera/56  MDC: 6 pts  Age Norms:  60-69: M - 55   F - 55  70-79: M - 54   F - 53  80-89: M - 53   F - 50 5xSTS: June et al 2010  MDC: 2.3 sec  Age Norms:  60-69: 11.4 sec  70-79: 12.6 sec  80-89: 14.8 sec   TUG  MDC: 4.14 sec  Cut off score:  >13.5 sec community dwelling adults  >32.2 frail elderly  <20 I for basic transfers  >30 dependent on transfers 10 Meter Walk Test: Shmuel and Christiano et al 2011  MDC: 0.18 m/s  20-29: M - 1.35 m   F - 1.34 m  30-39: M - 1.43 m   F - 1.34 m  40-49: M - 1.43 m   F - 1.39 m  50-59: M - 1.43 m   F - 1.31 m  60-69: M - 1.34 m   F - 1.24 m  70-79: M - 1.26 m   F - 1.13 m  80-89: M - 0.97 m   F - 0.94 m    Household Ambulator < 0.4 m/s  Limited Community Ambulator 0.4 - 0.8 m/s  Community Ambulator 0.8 - 1.2 m/s  Safely cross the street > 1.2 m/s   FGA  MCID: 4 pts  Geriatrics/community < 22/30 fall risk  Geriatrics/community < 20/30 unexplained falls    DGI  MDC: vestibular - 4 pts  MDC: geriatric/community - 3 pts  Falls risk <19/24 mCTSIB  Norm: 20-60 yrs  Eyes open firm: norm sway 0.21-0.48  Eyes closed firm: norm sway 0.48-0.99  Eyes open foam: norm sway 0.38-0.71  Eyes closed foam: norm sway 0.70-2.22   6 Minute Walk Test  MDC:  190.98 ft  MCID: 164 ft    Age Norms  60-69: M - 1876 ft (571.80 m)  F - 1765 ft (537.98 m)  70-79: M - 1729 ft (527.00 m)  F - 1545 ft (470.92 m)  80-89: M - 1368 ft (416.97 m)  F - 1286 ft (391.97 m) ABC: Smith & Tre, 2003  <67% increased risk for falls   [place] Monofilaments  Evaluator Size:        Force (grams):          Hand/Dorsal Thresholds:        Plantar Thresholds:  - 1.65                       - 0.008                       - Normal                                 - Normal  - 2.36                       - 0.02                         - Normal                                 - Normal  - 2.44                       - 0.04                         - Normal                                 - Normal  - 2.83                       - 0.07                         - Normal                                 - Normal  - 3.22                       - 0.16                         - Diminished light touch          - Normal  - 3.61                       - 0.40                         - Diminished light touch          - Normal  - 3.84                       - 0.60                         - Diminished protective           - Diminished light touch  - 4.08                       - 1.00                         - Diminished protective           - Diminished light touch  - 4.17                       - 1.40                         - Diminished protective           - Diminished light touch  - 4.31                       - 2.00                         - Diminished protective           - Diminished light touch  - 4.56                       - 4.00                         - Loss of protective sense      - Diminished protective  - 4.74                       - 6.00                         - Loss of protective sense      - Diminished protective  - 4.93                       - 8.00                         - Loss of protective sense      - Diminished protective  - 5.07                       - 10.0                         - Loss of  protective sense     - Loss of protective sense  - 5.18                       - 15.0                         - Loss of protective sense     - Loss of protective sense  - 5.46                       - 26.0                         - Loss of protective sense     - Loss of protective sense  - 5.88                       - 60.0                         - Loss of protective sense     - Loss of protective sense  - 6.10                       - 100                          - Loss of protective sense     - Loss of protective sense  - 6.45                       - 180                          - Loss of protective sense     - Loss of protective sense  - 6.65                       - 300                          - Deep pressure sense only  - Deep pressure sense only         Subjective    History of Present Illness  - Mechanism of injury:  Updated 1/30/25: Pt was discharged from outpatient PT on 1/7 with plans to initiate home PT. He went to the hospital on 1/11 diagnosed with RLE DVT and discharged home with home PT. Pt's wife reports home PT just had him walking, not really doing many other exercises or balance interventions. They return now for outpatient PT to receive more intensive therapy. Pt's wife reports he is falling every day and she cannot keep picking him up. They want his walking to get better. He always has supervision/assist at home, if his wife is at work his adult children stay with him.     Updated 4/1/25: Pt was hospitalized at [facility] for 17 days and had 3 cardiac caths, then was at [facility] for 2 weeks. Was discharged to SNF where he spent 8 days but hardly had any therapy. D/C home and now presents to PT. Pt's wife reports his mobility is worse than before. He is scheduled for valve replacement at [facility] in May. He had 1 fall since getting home, but family is with him 24/7.     Updated 5/29/25: Pt was last seen on 4/8/25 and then hospitalized again for prolonged time (2 separate hospitalizations- 1st time due  "to a fall out of the car and 2nd time due to fluid overload from dialysis). He was D/C home and saw home PT and OT for a few visits, and now feels ready to return to OP PT and OT. He is scheduled for TAVR on  and son reports they want him to be stronger for the procedure. Pt's wife stays home with him, and when she is at work (part-time) her son stays with him. Pt's wife reports she does all ADL's with him and transfers him; high caregiver burden.    Updated 25: Pt has been attending skilled PT consistently over past month. He did go to ED last week because of a fall; no major injury just a laceration on his arm. He is scheduled for TAVR next week. Pt's wife continues to report high caregiver burden, especially with lifting for sit>stands.     - Primary AD: RW  - Assist level at home: family assists him with ADLs  - Decreased fine motor tasks: No    Patient goal:  \"I want to go home and get better and never come back here again\"    Pain  - Current pain ratin/10  - At best pain ratin/10  - At worst pain ratin/10  - Location: low back  - Aggravating factors: unsure     Social Support  - Steps to enter house: 3  - Stairs in house: yes but bedroom on 1st floor    - Lives in: multi story house   - Lives with: wife and 2 kids     - Employment status: retired   - Hand dominance: right    Treatments  - Previous treatment: outpatient neuro PT at this site  - Current treatment: skilled PT        Objective     LE MMT  - R Hip Flexion: 3+/5  L Hip Flexion: 3+/5  - R Hip Abduction: 3+/5  L Hip Abduction: 3+/5  - R Hip Adduction: 3+/5  L Hip Adduction: 3+/5  - R Knee Extension: 3+/5  L Knee Extension: 3+/5  - R Ankle DF: 4/5   L Ankle DF: 4/5  - R Ankle PF: 4/5   L Ankle PF: 4/5    Sensation  - Light touch: intact     Coordination  - Heel to Shin: impaired B/L  - Alternate Toe Taps: impaired B/L      Gait  - Abnormalities: ambulates without AD on eval, demos narrow JOSE DAVID, inconsistent step length, lateral " instability     Functional mobility:  - Sit to stand: min-mod A  - Transfers: min A with RW  - Ambulation: min A with rollator      Outcome Measures Initial Eval  9/5/24 Re-eval  10/10/24 Re eval   11/7/24 Re-eval  1/30/25 Re-eval  4/1/25 Re-eval  5/29/25 Re-eval  6/26/25   5xSTS 15.85 sec, 2 UE  15.41 sec, 2 UE  21.06 sec, pushing off thighs  46.97 sec pushing off thighs + mod A Only performed 3 reps, 25.63 sec 43.48 sec pushing off armrests + mod A 32.07 sec pushing off armrests + mod A   TUG  - Regular  - Cognitive   17.3 sec, no AD  24.3 sec, (serial 3s)   12.59 sec, no AD  18.56 sec, no AD (serial 3s)   16. 44 sec, pushing off thighs  19.74 sec, serial 3s   22.11 sec with RW    27.53 sec with RW, serial 3s   24.52 sec with RW   43.59 sec with RW   26.82 sec with rollator   10 meter   1.18 m/s without AD   0.86 m/s with cane   0.71 m/s wo cane          BURGER 30/56 33/56 34/56 14/56 7/56 8/56 9/56   mCTSIB  - FTEO (firm)  - FTEC (firm)  - FTEO (foam)  - FTEC (foam)   30 sec +  30 ++  2 sec  0 sec   30 sec+  30 sec+  3 sec  0 sec   30 sec+  30 sec+  3 sec   0 sec       6MWT 100 ft (attempted but only able to complete 1 minute) 275 ft in 4 minutes  212 ft in 2 minutes w no cane   2MWT: 84 ft with RW and CG 2MWT: 104 ft with RW and min A 2MWT: 127 ft with min A and rollator, pt stopped at 1 min 41 sec   ABC 47.5% 81.88% 72.5%                      Interventions performed 6/26/25:  - sit to stands with BUE support: 3 reps x 2 sets with min A and vc's for hand placement  - Nustep L1 x 6 min, BUE/BLE for general conditioning      Precautions: falls   Past Medical History:   Diagnosis Date    Cerebrovascular accident (CVA) due to thrombosis of left middle cerebral artery (HCC) 07/29/2018    Chronic kidney disease     Coronary artery disease     Diabetes mellitus (HCC)     not on meds    Dialysis patient (HCC)     M-W-F    Fistula     left upper arm for hemodialyis    GERD (gastroesophageal reflux disease)     History of  coronary artery stent placement     x3    Hypercholesteremia     Hyperlipidemia     Hypertension     Infectious viral hepatitis     B as child    Limb alert care status     no BP/IV left arm    Neuropathy     Nonhealing ulcer of heel ([facility]) 04/25/2023    Obesity     Osteomyelitis ([facility])     last assessed 11/4/16-per son not currently    Penetrating foot wound, left, initial encounter 01/19/2022    PVC's (premature ventricular contractions)     sees  Cardio    Stroke ([facility])     last weeof July 2018 [facility]    TIA (transient ischemic attack) 10/28/2018    Ulcer of left heel, limited to breakdown of skin ([facility]) 06/13/2024    Wears dentures     Wears glasses

## 2025-06-26 NOTE — PROGRESS NOTES
Wound Procedure Treatment Anterior;Right Toe D1, great    Performed by: Vivian Musa RN  Authorized by: Franklin Church DPM  Associated wounds:   Wound 06/26/25 Diabetic Ulcer Toe D1, great Anterior;Right    Wound cleansed with:  NSS   Applied topical:  Medihoney gel   Applied secondary dressing:  Gauze   Dressing secured with:  Tape, Surgilast, Elastic tubular stocking and Size F

## 2025-06-26 NOTE — PROGRESS NOTES
"Patient ID: Asad Galloway is a 65 y.o. male Date of Birth 1960       Chief Complaint   Patient presents with    New Patient Visit     Right great toe wound, present for \"about 2 months\"       Allergies:  Patient has no known allergies.    Assessments:      Diagnosis ICD-10-CM Associated Orders   1. Ulcer of toe of right foot, with fat layer exposed ([facility])  L97.512 Wound cleansing and dressings Anterior;Right Toe D1, great     Wound Procedure Treatment Anterior;Right Toe D1, great     Debridement Anterior;Right Toe D1, great     collagenase (SANTYL) ointment      2. Type 2 diabetes mellitus with diabetic neuropathy, unspecified whether long term insulin use ([facility])  E11.40 Wound cleansing and dressings Anterior;Right Toe D1, great     Wound Procedure Treatment Anterior;Right Toe D1, great     Debridement Anterior;Right Toe D1, great     collagenase (SANTYL) ointment      3. Peripheral arterial disease ([facility])  I73.9 Wound cleansing and dressings Anterior;Right Toe D1, great     Wound Procedure Treatment Anterior;Right Toe D1, great               Plan:   Reviewed medical records.  Patient was counseled and educated on the condition and the diagnosis.    2. The diagnosis, treatment options and prognosis were discussed with the patient.    3. Continue Santyl.  Pt to keep the dressing intact all times. Stressed on patient compliance about proper off-loading, and staying off of foot to reduce the risk of infection, non-healing, and limb loss.    4. Patient will return in 2 weeks for re-evaluation.         Imaging: I have personally reviewed pertinent films in PACS  Labs, pathology, and Other Studies: I have personally reviewed pertinent reports.        Debridement   Wound 06/26/25 Diabetic Ulcer Toe D1, great Anterior;Right     Date/Time: 6/26/2025 10:30 AM    Universal Protocol:  Consent: Verbal consent obtained  Risks and benefits: risks, benefits and alternatives were discussed  Consent given by: patient  Time out: " "Immediately prior to procedure a \"time out\" was called to verify the correct patient, procedure, equipment, support staff and site/side marked as required.  Timeout called at: 6/26/2025 11:21 AM.  Patient understanding: patient states understanding of the procedure being performed  Patient identity confirmed: verbally with patient    Debridement Details  Performed by: physician  Debridement type: selective    Post-debridement measurements  Length (cm): 1.2  Width (cm): 0.8  Depth (cm): 0.1  Percent debrided: 100%  Surface Area (cm^2): 0.75  Area Debrided (cm^2): 0.75  Volume (cm^3): 0.05    Tissue and other material debrided: dermis and epidermis  Devitalized tissue debrided: biofilm, fibrin and slough  Instrument(s) utilized: blade  Technique utilized: excisional  Bleeding: small  Hemostasis obtained with: pressure  Procedural pain (0-10): 0  Post-procedural pain: 0   Response to treatment: procedure was tolerated well           Subjective:   HPI  The patient was referred to our center for evaluation and treatment of wound in right great toe.  He had it for a couple of months.  He was seen in the office 2 days ago.  Instructed him to start Santyl.   His wife reports he keeps taking his dressing off at home.  BS under control.         The following portions of the patient's history were reviewed and updated as appropriate:   Problem List[1]  Past Medical History[2]  Past Surgical History[3]  Family History[4]   Social History[5]   Current Medications[6]    Review of Systems   Constitutional:  Negative for chills and fever.   Respiratory:  Negative for cough and shortness of breath.    Cardiovascular:  Negative for chest pain.   Gastrointestinal:  Negative for nausea and vomiting.   Neurological:  Positive for numbness. Negative for weakness.       Objective:  /53   Pulse 71   Temp (!) 96.5 °F (35.8 °C)   Resp 18   Ht 5' 8\" (1.727 m)   Wt 92.5 kg (204 lb)   BMI 31.02 kg/m²         Physical Exam  Vitals " and nursing note reviewed.   Constitutional:       General: He is not in acute distress.     Appearance: He is not toxic-appearing or diaphoretic.     Cardiovascular:      Rate and Rhythm: Normal rate and regular rhythm.      Pulses:           Dorsalis pedis pulses are 0 on the right side and 1+ on the left side.        Posterior tibial pulses are 0 on the right side and 0 on the left side.   Pulmonary:      Effort: Pulmonary effort is normal. No respiratory distress.     Musculoskeletal:         General: Deformity present. No signs of injury.      Right lower leg: Edema present.      Left lower leg: Edema present.      Right foot: No Charcot foot or foot drop.      Left foot: No Charcot foot or foot drop.      Comments: S/P partial amp right 3rd toe.   Feet:      Right foot:      Skin integrity: Dry skin present. No ulcer.      Left foot:      Skin integrity: Dry skin present. No ulcer.     Skin:     General: Skin is warm and dry.      Capillary Refill: Capillary refill takes less than 2 seconds.      Coloration: Skin is not cyanotic or mottled.      Findings: No abscess or erythema.      Nails: There is no clubbing.      Comments: He has Oliveros 1 ulcer in right great toe.  Wound bed is fibrous.  No deep probing.  No redness or purulence. See wound assessment and photo.     Neurological:      General: No focal deficit present.      Mental Status: He is alert and oriented to person, place, and time.      Cranial Nerves: No cranial nerve deficit.      Sensory: Sensory deficit present.      Motor: No weakness.      Coordination: Coordination normal.     Psychiatric:         Mood and Affect: Mood normal.         Behavior: Behavior normal.         Thought Content: Thought content normal.         Judgment: Judgment normal.           Wound 06/26/25 Diabetic Ulcer Toe D1, great Anterior;Right (Active)   Wound Image Images linked 06/26/25 1057   Wound Description Eschar;Brown;Slough;Yellow;Pink 06/26/25 1058   Wound Length  (cm) 1.2 cm 06/26/25 1058   Wound Width (cm) 0.8 cm 06/26/25 1058   Wound Depth (cm) 0.1 cm 06/26/25 1058   Wound Surface Area (cm^2) 0.75 cm^2 06/26/25 1058   Wound Volume (cm^3) 0.05 cm^3 06/26/25 1058   Calculated Wound Volume (cm^3) 0.1 cm^3 06/26/25 1058   Drainage Amount Small 06/26/25 1058   Drainage Description Yellow;Tan 06/26/25 1058   Lani-wound Assessment Scaly;Pink;Dry 06/26/25 1058   Dressing Status Intact 06/26/25 1058                 Lab Results   Component Value Date    HGBA1C 5.4 04/10/2025       Wound Instructions:  Orders Placed This Encounter   Procedures    Wound cleansing and dressings Anterior;Right Toe D1, great     Right Great toe wound:    Ok to shower with wound uncovered. Recommend using unscented dove. Gently cleanse, rinse well, and pat dry. Apply new dressing immediately.  Do not leave open to air      Apply nickel thick amount of santyl to wound  Cover with gauze  Secure with tape  Change dressing daily         Elastic Tubular Stocking--size F     Tubular elastic bandage: Apply from base of toes to behind the knee. Apply in AM, may remove for sleep.    Avoid prolonged standing in one place.    Elevate leg(s) above the level of the heart when sitting or as much as possible.     Standing Status:   Future     Expiration Date:   7/10/2025    Wound Procedure Treatment Anterior;Right Toe D1, great     This order was created via procedure documentation    Debridement Anterior;Right Toe D1, great     This order was created via procedure documentation             Franklin Church DPM         [1]   Patient Active Problem List  Diagnosis    Dizziness    Mixed hyperlipidemia    Antiplatelet or antithrombotic long-term use    Nephrotic range proteinuria    Elevated alkaline phosphatase level    GERD (gastroesophageal reflux disease)    Other constipation    Abnormal EEG    History of stroke in adulthood    Anxiety associated with depression    Urge incontinence    S/P arteriovenous (AV) fistula creation     Pre-kidney transplant, listed    Anemia    History of 2019 novel coronavirus disease (COVID-19)    ESRD on dialysis (HCC)    Emotional lability    Primary hypertension    Generalized weakness    Chest pain    Electrolyte abnormality    CAD, multiple vessel    SOB (shortness of breath)    Left arm swelling    Fall    Hyponatremia    Ischemic cardiomyopathy    Aortic stenosis    Mood disorder (HCC)    Diabetic polyneuropathy associated with type 2 diabetes mellitus (HCC)    Absence of toe of right foot (HCC)    Hammer toes of both feet    Presence of external cardiac defibrillator    Rectal bleeding    Edema of left lower extremity    Chronic systolic heart failure (HCC)    Old myocardial infarction    Hypertensive heart and chronic kidney disease with heart failure and with stage 5 chronic kidney disease, or end stage renal disease (HCC)    PAD (peripheral artery disease) (HCC)    Pre-diabetes    Right ankle pain    Acute embolism and thrombosis of right peroneal vein (HCC)    Bicytopenia    Obesity (BMI 30.0-34.9)    QT prolongation    Chronic deep vein thrombosis (DVT) of distal vein of right lower extremity (HCC)    Thrombocytopenia (HCC)    Type 2 diabetes mellitus (HCC)    Ambulatory dysfunction    Peripheral arterial disease (HCC)    Chronic kidney disease-mineral and bone disorder    Anemia due to chronic kidney disease, on chronic dialysis (HCC)    HFrEF (heart failure with reduced ejection fraction) (HCC)    SIRS (systemic inflammatory response syndrome) (HCC)    Acute encephalopathy    Fluid overload    Hypotension    Cirrhosis (HCC)    Hyperkalemia    Sarcopenia    Impaired mobility and ADLs    Shock (HCC)    Goals of care, counseling/discussion    Nonrheumatic aortic valve stenosis    Encounter for dialysis ([facility])    Palliative care by specialist    Finger lesion    Ulcer of toe of right foot, with fat layer exposed (HCC)   [2]   Past Medical History:  Diagnosis Date    Cerebrovascular accident (CVA)  due to thrombosis of left middle cerebral artery (HCC) 07/29/2018    Chronic kidney disease     Coronary artery disease     Diabetes mellitus (HCC)     not on meds    Dialysis patient ([facility])     M-W-F    Fistula     left upper arm for hemodialyis    GERD (gastroesophageal reflux disease)     History of coronary artery stent placement     x3    Hypercholesteremia     Hyperlipidemia     Hypertension     Infectious viral hepatitis     B as child    Limb alert care status     no BP/IV left arm    Neuropathy     Nonhealing ulcer of heel (HCC) 04/25/2023    Obesity     Osteomyelitis ([facility])     last assessed 11/4/16-per son not currently    Penetrating foot wound, left, initial encounter 01/19/2022    PVC's (premature ventricular contractions)     sees [facility]    Stroke ([facility])     last weeof July 2018 [facility]    TIA (transient ischemic attack) 10/28/2018    Ulcer of left heel, limited to breakdown of skin ([facility]) 06/13/2024    Wears dentures     Wears glasses    [3]   Past Surgical History:  Procedure Laterality Date    ABDOMINAL SURGERY      CARDIAC CATHETERIZATION N/A 05/02/2022    Procedure: Cardiac Coronary Angiogram;  Surgeon: Sam Davis MD;  [facility];  Service: Cardiology    CARDIAC CATHETERIZATION N/A 05/02/2022    Procedure: Cardiac pci;  Surgeon: Sam Davis MD;  [facility];  Service: Cardiology    CARDIAC CATHETERIZATION  02/01/2023    Procedure: Cardiac catheterization;  Surgeon: Sam Davis MD;  [facility];  Service: Cardiology    CARDIAC CATHETERIZATION N/A 02/01/2023    Procedure: Cardiac pci;  Surgeon: Sam Davis MD;  [facility];  Service: Cardiology    CARDIAC CATHETERIZATION N/A 02/01/2023    Procedure: Cardiac Coronary Angiogram;  Surgeon: Sam Davis MD;  [facility];  Service: Cardiology    CARDIAC CATHETERIZATION N/A 02/01/2023    Procedure: Cardiac other-IVUS;  Surgeon: Sam Davis  "MD;  [facility];  Service: Cardiology    CHOLECYSTECTOMY      Percutaneous    COLONOSCOPY      CORONARY ANGIOPLASTY WITH STENT PLACEMENT      CYSTOSCOPY      HEMODIALYSIS ADULT  1/22/2024    HEMODIALYSIS ADULT  01/24/2024    HEMODIALYSIS ADULT  5/19/2025    IR LOWER EXTREMITY ANGIOGRAM  9/29/2023    IR LOWER EXTREMITY ANGIOGRAM  02/26/2024    OTHER SURGICAL HISTORY      \"stimulator to control bowel movements\"    ID ESOPHAGOGASTRODUODENOSCOPY TRANSORAL DIAGNOSTIC N/A 09/27/2016    Procedure: ESOPHAGOGASTRODUODENOSCOPY (EGD);  Surgeon: Adele Rowe MD;  Location: [facility] GI [facility];  Service: Gastroenterology    ID LAPAROSCOPY SURG CHOLECYSTECTOMY N/A 02/29/2016    Procedure: LAPAROSCOPIC CHOLECYSTECTOMY ;  Surgeon: Cliff Roman DO;  Location: [facility];  Service: General    ID SLCTV CATHJ 3RD+ ORD SLCTV ABDL PEL/LXTR BRNCH Left 9/29/2023    Procedure: Left leg angiogram with intervention;  Surgeon: Michelle Galvan MD;  Location: [facility];  Service: Vascular    ID SLCTV CATHJ 3RD+ ORD SLCTV ABDL PEL/LXTR BRNCH Left 02/26/2024    Procedure: Left leg angiogram antegrade access, left popliteal angioplasty;  Surgeon: Michelle Galvan MD;  Location: [facility];  Service: Vascular    ROTATOR CUFF REPAIR Right     SPINAL CORD STIMULATOR IMPLANT      \"Medtronic interstim model # 3058- in lower back to control bowel movements-currently turned off-battery is dead\"    TOE AMPUTATION Right 10/28/2016    Procedure: 3RD TOE AMPUTATION ;  Surgeon: Anjel Salas DPM;  Location: [facility];  Service:    [4]   Family History  Problem Relation Name Age of Onset    Leukemia Mother      Liver disease Mother      Lung cancer Mother          heavy smoker - 3 ppd    Heart disease Father Son     Liver disease Father Son     Diabetes type I Father Son     Multiple myeloma Sister      Breast cancer Sister      Urolithiasis Family      Alcohol abuse Neg Hx      Depression Neg Hx      Drug abuse Neg Hx      " Substance Abuse Neg Hx      Mental illness Neg Hx     [5]   Social History  Socioeconomic History    Marital status: /Civil Union    Highest education level: Not asked   Occupational History    Occupation: disabled   Tobacco Use    Smoking status: Never    Smokeless tobacco: Never   Vaping Use    Vaping status: Never Used   Substance and Sexual Activity    Alcohol use: Never    Drug use: No    Sexual activity: Not Currently     Partners: Female     Comment: defer   Social History Narrative    Daily caffeine consumption 2-3 servings a day     Social Drivers of Health     Financial Resource Strain: Patient Declined (7/13/2023)    Overall Financial Resource Strain (CARDIA)     Difficulty of Paying Living Expenses: Patient declined   Food Insecurity: No Food Insecurity (5/22/2025)    Nursing - Inadequate Food Risk Classification     Worried About Running Out of Food in the Last Year: Never true     Ran Out of Food in the Last Year: Never true     Ran Out of Food in the Last Year: Never true   Transportation Needs: No Transportation Needs (5/28/2025)    OASIS : Transportation     Lack of Transportation (Medical): No     Lack of Transportation (Non-Medical): No     Patient Unable or Declines to Respond: No   Stress: No Stress Concern Present (1/18/2019)    [place] [employer] - Occupational Stress Questionnaire     Feeling of Stress : Only a little   Social Connections: Unknown (1/18/2019)    Social Connection and Isolation Panel     Marital Status:    Intimate Partner Violence: Unknown (5/22/2025)    Nursing IPS     Physically Hurt by Someone: No     Hurt or Threatened by Someone: No   Housing Stability: Unknown (5/22/2025)    Nursing: Inadequate Housing Risk Classification     Unable to Pay for Housing in the Last Year: No     Has Housing: No   [6]   Current Outpatient Medications:     collagenase (SANTYL) ointment, Apply topically daily, Disp: 30 g, Rfl: 1    apixaban (Eliquis)  2.5 mg, Take 1 tablet (2.5 mg total) by mouth 2 (two) times a day, Disp: 60 tablet, Rfl: 3    aspirin 81 mg chewable tablet, Chew 1 tablet in the morning., Disp: , Rfl:     atorvastatin (LIPITOR) 40 mg tablet, Take 1 tablet (40 mg total) by mouth daily with dinner, Disp: 90 tablet, Rfl: 3    metoprolol succinate (TOPROL-XL) 25 mg 24 hr tablet, Take 12.5 mg by mouth daily, Disp: , Rfl:     midodrine (PROAMATINE) 5 mg tablet, Take 3 tablets (15 mg total) by mouth 3 (three) times a day before meals, Disp: 810 tablet, Rfl: 0    prasugrel (EFFIENT) tablet, Take 1 tablet (10 mg total) by mouth daily, Disp: 90 tablet, Rfl: 3    Vitamin D3 1.25 MG (43501 UT) capsule, TAKE 1 CAPSULE BY MOUTH ONE TIME PER WEEK, Disp: 12 capsule, Rfl: 4

## 2025-06-27 DIAGNOSIS — E55.9 VITAMIN D DEFICIENCY: ICD-10-CM

## 2025-06-27 RX ORDER — METHOCARBAMOL 750 MG/1
TABLET ORAL
Qty: 12 CAPSULE | Refills: 4 | Status: SHIPPED | OUTPATIENT
Start: 2025-06-27

## 2025-07-03 ENCOUNTER — NURSE TRIAGE (OUTPATIENT)
Age: 65
End: 2025-07-03

## 2025-07-03 ENCOUNTER — PATIENT MESSAGE (OUTPATIENT)
Dept: VASCULAR SURGERY | Facility: CLINIC | Age: 65
End: 2025-07-03

## 2025-07-03 NOTE — TELEPHONE ENCOUNTER
"REASON FOR CONVERSATION: wound on hands  Pt called to report that he has several small \"blood blisters\" on his bilateral hands that are not healing s/p fall at the end of may. He see's wound care regularly and was told he should see wound care for this as they are not healing.     SYMPTOMS: 4-5 Small blood blisters (black in color) the size of a \"rice krispy\", denies redness, warmth and drainage with mild tenderness and swelling     OTHER HEALTH INFORMATION: Pt was last seen by the provider 11/13/24 for DVT, non healing wound of heel and PAD; he was scheduled an urgent visit 8/14 for this new problem and is scheduled 8/27 for existing problems. Pt is seen by wound care regularly     PROTOCOL DISPOSITION: See Within 3 Days in Office    CARE ADVICE PROVIDED: PT was scheduled first available urgent apt Advised the patient would inform the provider of new problem and symptoms and would call back if any recommendations     PRACTICE FOLLOW-UP: Please call back if any recommendations prior to scheduled apt 8/14      Reason for Disposition   Patient wants to be seen    Answer Assessment - Initial Assessment Questions  1. APPEARANCE of SORES: \"What do the sores look like?\"      Pt reports \"Blood blisters\" on his bilateral hands from a fall at the end of may   2. NUMBER: \"How many sores are there?\"      4-5  3. SIZE: \"How big is the largest sore?\"      Size of a \"Rice Krispie\"  4. LOCATION: \"Where are the sores located?\"      Bilateral hands  5. ONSET: \"When did the sores begin?\"      Blood blisters appeared 2 weeks ago   6. TENDER: \"Does it hurt when you touch it?\"  (Scale 1-10; or mild, moderate, severe)       Mild tenderness   7. CAUSE: \"What do you think is causing the sores?\"      Fall, blood blisters - unsure   8. OTHER SYMPTOMS: \"Do you have any other symptoms?\" (e.g., fever, new weakness)      Denies drainage, redness, warmth, fever or chills    Protocols used: Sores-Adult-[place]    "

## 2025-07-07 ENCOUNTER — TELEPHONE (OUTPATIENT)
Dept: CARDIOLOGY CLINIC | Facility: CLINIC | Age: 65
End: 2025-07-07

## 2025-07-07 ENCOUNTER — TELEPHONE (OUTPATIENT)
Age: 65
End: 2025-07-07

## 2025-07-07 DIAGNOSIS — Z95.2 S/P TAVR (TRANSCATHETER AORTIC VALVE REPLACEMENT): ICD-10-CM

## 2025-07-07 DIAGNOSIS — I35.0 NONRHEUMATIC AORTIC VALVE STENOSIS: Primary | ICD-10-CM

## 2025-07-07 NOTE — TELEPHONE ENCOUNTER
Called and spoke with patient's son, Sam, regarding a follow up nurse home visit after his dad's recent discharge from the hospital.  Asad had TAVR procedure at [facility] on 7/1/25.  Sam states he is doing well and has more energy than before the procedure.  Reports that his dad will have PT/OT in the home as part of his discharge planning.  Asad has resumed his dialysis three days a week.  Since his father is doing well, Sam did not think that a follow up nurse home visit was needed at this time.  Encouraged Sam to call us if there are any needs, questions, or concerns. Sam was appreciative of the call.

## 2025-07-07 NOTE — TELEPHONE ENCOUNTER
Caller: Sam Galloway     Doctor: Dr. Britney Tan MD     Reason for call: Pt son called requesting a EDD order to be placed in chart and scheduled with [facility], [facility]. Requesting TAVR to be done with 1 month as per recommendations of [facility].    Call back#: [phone]

## 2025-07-08 ENCOUNTER — OFFICE VISIT (OUTPATIENT)
Facility: CLINIC | Age: 65
End: 2025-07-08
Payer: MEDICARE

## 2025-07-08 ENCOUNTER — OFFICE VISIT (OUTPATIENT)
Dept: HEMATOLOGY ONCOLOGY | Facility: MEDICAL CENTER | Age: 65
End: 2025-07-08
Payer: MEDICARE

## 2025-07-08 VITALS
HEART RATE: 87 BPM | BODY MASS INDEX: 30.66 KG/M2 | RESPIRATION RATE: 16 BRPM | TEMPERATURE: 98.3 F | SYSTOLIC BLOOD PRESSURE: 94 MMHG | HEIGHT: 69 IN | WEIGHT: 207 LBS | DIASTOLIC BLOOD PRESSURE: 62 MMHG | OXYGEN SATURATION: 100 %

## 2025-07-08 DIAGNOSIS — R26.2 DIFFICULTY WALKING: Primary | ICD-10-CM

## 2025-07-08 DIAGNOSIS — R53.1 WEAKNESS GENERALIZED: ICD-10-CM

## 2025-07-08 DIAGNOSIS — I82.5Z1 CHRONIC DEEP VEIN THROMBOSIS (DVT) OF DISTAL VEIN OF RIGHT LOWER EXTREMITY (HCC): ICD-10-CM

## 2025-07-08 DIAGNOSIS — I82.451 ACUTE EMBOLISM AND THROMBOSIS OF RIGHT PERONEAL VEIN (HCC): Primary | ICD-10-CM

## 2025-07-08 PROCEDURE — 99213 OFFICE O/P EST LOW 20 MIN: CPT | Performed by: INTERNAL MEDICINE

## 2025-07-08 PROCEDURE — 97110 THERAPEUTIC EXERCISES: CPT | Performed by: PHYSICAL THERAPIST

## 2025-07-08 PROCEDURE — 97530 THERAPEUTIC ACTIVITIES: CPT | Performed by: PHYSICAL THERAPIST

## 2025-07-08 NOTE — PROGRESS NOTES
Patient is S/P TAVR on 7/1. ECHO ordered for 1 month post-TAVR assessment, as he will be following locally here.    Britney Tan MD

## 2025-07-08 NOTE — PROGRESS NOTES
Asad Galloway  1960  [facility] HEMATOLOGY ONCOLOGY SPECIALISTS [address] [place] [facility] [id number]    DISCUSSION/SUMMARY:    Approximately 20 minutes was spent with patient/family in direct consultation, physical examination and review of the chart.    65-year-old male with multiple medical issues:    Lower extremity DVT.  Patient is on Eliquis 2.5 mg twice a day; also on aspirin 81 mg a day and Effient 10 mg a day.  The second 2 medications are cardiac.  Bruising is significant on the upper extremities, about the same as before.  Recent vascular upper limb venous duplex scan bilateral complete demonstrates resolution of the prior thrombosis.  The plan is for patient to continue the Eliquis for 2 more weeks and then discontinue.  Patient understands that he needs to monitor for excessive bruising/bleeding.    Patient is to return in 4 months with repeat bilateral lower extremity venous duplex exam.    Status post TAVR surgery.  Patient will follow-up with cardiology at [place], [place].  Cardiac physicians will need to make decisions regarding the aspirin and Effient dosing/timing.  As above, patient has 2 more weeks of Eliquis.    Multiple bruising.  Patient understands that he needs to be very careful about falling.  Patient has a pending appointment with wound care.  Specifically the left fifth digit lesion needs to be evaluated.    Recent CBC.  The CBC values recently performed are okay/acceptable, white count is okay, platelet count is decreased but actually slightly higher than before.  The H/H values are okay, decreased but actually slightly higher than before also.  Dialysis routinely checks the CBC parameters.    Patient is to return in 4 months with repeat lower extremity Dopplers before.  Patient/son know to call the hematology/oncology office if there are any other questions or concerns.    Carefully review your medication list and verify that the list is  accurate and up-to-date. Please call the hematology/oncology office if there are medications missing from the list, medications on the list that you are not currently taking or if there is a dosage or instruction that is different from how you're taking that medication.    Patient goals and areas of care: Anticoagulation  Barriers to care: Multiple comorbidities  Patient is not able to self-care  _____________________________________________________________________________________    Chief Complaint   Patient presents with    Follow-up    Upper extremity DVT     History of Present Illness: 65-year-old male with very complicated medical history.  Patient has a history of CVA, stent placement, end-stage renal disease, congestive heart failure, anemia, hyperlipidemia, catheter induced DVT in the left neck.  Patient is followed by cardiology through [facility].  Patient with recent diagnosis of lower extremity DVT.  Besides aspirin and Effient, patient has just completed approximately 3 months of Eliquis 2.5 mg every 12 hours. Patient returns with his son and wife.    Mr. Galloway sign/-, about the same as before.  Patient still with falls, undergoing physical therapy.  No excessive bleeding but patient has a significant number of bruises on his upper extremities.  Patient also with a bad wound on the left fifth digit.  Wound care center evaluation is pending.    Patient recently underwent TAVR surgery, no complications.  Patient states feeling better, seems to have more strength, activities have improved.    No recent renal issues.  Dialysis ongoing.    No fevers or signs of infection.  No oral, GI or  bleeding.  Leg swelling is less/better than before.    Review of Systems   Constitutional:  Positive for activity change and fatigue. Negative for unexpected weight change.   HENT: Negative.     Eyes: Negative.    Respiratory: Negative.     Cardiovascular:  Negative for leg swelling.   Gastrointestinal: Negative.     Endocrine: Negative.    Genitourinary: Negative.    Musculoskeletal: Negative.    Skin: Negative.    Allergic/Immunologic: Negative.    Neurological: Negative.    Hematological: Negative.    Psychiatric/Behavioral: Negative.     All other systems reviewed and are negative.    Social History     Socioeconomic History    Marital status: /Civil Union     Spouse name: Not on file    Number of children: Not on file    Years of education: Not on file    Highest education level: Not asked   Occupational History    Occupation: disabled   Tobacco Use    Smoking status: Never    Smokeless tobacco: Never   Vaping Use    Vaping status: Never Used   Substance and Sexual Activity    Alcohol use: Never    Drug use: No    Sexual activity: Not Currently     Partners: Female     Comment: defer   Other Topics Concern    Not on file   Social History Narrative    Daily caffeine consumption 2-3 servings a day     Social Drivers of Health     Financial Resource Strain: Patient Declined (7/13/2023)    Overall Financial Resource Strain (CARDIA)     Difficulty of Paying Living Expenses: Patient declined   Food Insecurity: No Food Insecurity (7/2/2025)    Received from [facility], [facility], and [facility]    Hunger Vital Sign     Within the past 12 months, you worried that your food would run out before you got the money to buy more.: Never true     Within the past 12 months, the food you bought just didn't last and you didn't have money to get more.: Never true   Transportation Needs: Unknown (7/2/2025)    Received from [facility], [facility], and [facility]    PRAPARE - Transportation     Lack of Transportation (Medical): No     Lack of Transportation (Non-Medical): Not on file   Physical Activity: Not on file   Stress: No Stress Concern Present (1/18/2019)    [employer] [place] [employer] - Occupational Stress Questionnaire     Feeling  of Stress : Only a little   Social Connections: Unknown (1/18/2019)    Social Connection and Isolation Panel     Frequency of Communication with Friends and Family: Not on file     Frequency of Social Gatherings with Friends and Family: Not on file     Attends [employer] Services: Not on file     Active Member of Clubs or Organizations: Not on file     Attends Club or Organization Meetings: Not on file     Marital Status:    Intimate Partner Violence: Unknown (5/22/2025)    Nursing IPS     Feels Physically and Emotionally Safe: Not on file     Physically Hurt by Someone: Not on file     Humiliated or Emotionally Abused by Someone: Not on file     Physically Hurt by Someone: No     Hurt or Threatened by Someone: No   Housing Stability: Unknown (7/2/2025)    Received from [facility], [facility], and [facility]    Housing Stability Vital Sign     Unable to Pay for Housing in the Last Year: Not on file     Number of Times Moved in the Last Year: Not on file     At any time in the past 12 months, were you homeless or living in a shelter (including now)?: No     Current Medications[1]    Allergies[2]    Vitals:    07/08/25 1435   BP: 94/62   Pulse: 87   Resp: 16   Temp: 98.3 °F (36.8 °C)   SpO2: 100%     Physical Exam  Constitutional:       Appearance: He is well-developed.   HENT:      Head: Normocephalic and atraumatic.      Right Ear: External ear normal.      Left Ear: External ear normal.     Eyes:      Conjunctiva/sclera: Conjunctivae normal.      Pupils: Pupils are equal, round, and reactive to light.       Cardiovascular:      Rate and Rhythm: Normal rate and regular rhythm.      Heart sounds: Normal heart sounds.   Pulmonary:      Effort: Pulmonary effort is normal.      Breath sounds: Normal breath sounds.      Comments: Scattered bilateral rhonchi, rales at bases bilaterally  Abdominal:      General: Bowel sounds are normal.      Palpations: Abdomen is soft.      Musculoskeletal:         General: Normal range of motion.      Cervical back: Normal range of motion and neck supple.     Skin:     General: Skin is warm.      Comments: Patient with nonfunctioning left upper extremity AV fistula, patient with multiple ecchymoses on both upper extremities, nowhere else on the body, there is one cut on the right dorsal hand and as above, a significant lesion on the left fifth digit     Neurological:      Mental Status: He is alert and oriented to person, place, and time.      Deep Tendon Reflexes: Reflexes are normal and symmetric.     Psychiatric:         Behavior: Behavior normal.         Thought Content: Thought content normal.         Judgment: Judgment normal.     Extremities: 0-1+ bilateral lower extremity edema, no cords, pulses are 1+  Lymphatics: No adenopathy in the neck, supraclavicular region, axilla bilaterally    Labs    7/3/2025 WBC = 6.36 hemoglobin = 9.6 hematocrit = 30.8 MCV = 101 platelet = 94 BUN = 19 creatinine = 3.96    Imaging    5/29/2025 vascular upper limb venous duplex bilateral    CONCLUSION:     RIGHT UPPER LIMB:   No evidence of acute or chronic deep vein thrombosis.    Evidence of chronic superficial thrombophlebitis is noted in the cephalic vein at the elbow and the mid-distal forearm.   Doppler evaluation shows a normal response to augmentation maneuver.       LEFT UPPER LIMB:   No evidence of acute or chronic deep vein thrombosis.    No evidence of superficial thrombophlebitis is noted.   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   Doppler evaluation shows a normal response to augmentation maneuver.      Compared to the previous study on 03/30/2025, there is resolution of the left IJV thrombus.       5/17/2025 CAT scan chest abdomen pelvis without contrast    IMPRESSION:     1.  Endotracheal tube tip terminates approximately 6 cm above the brian. Scattered mild  interlobular septal thickening and areas of groundglass opacities are seen bilaterally which may reflect interstitial pulmonary edema and asymmetrical alveolar   edema. Additional small areas of irregular consolidative opacities are also seen bilaterally which may reflect areas of atelectasis or early airspace disease. Recommend clinical correlation.  2.  A few nonspecific mildly prominent mediastinal and hilar lymph nodes measuring up to 1.7 x 1.1 cm noted.  3.  Subcutaneous air in the bilateral subclavian regions noted of uncertain etiology.  4.  Cirrhotic morphology of the liver and splenomegaly noted.  5.  Symmetrical renal atrophy and cortical thinning again noted bilaterally which could be related to chronic renal failure. No hydronephrosis.  6.  No evidence of bowel obstruction, inflammation, appendicitis, or free air. Moderate volume of abdominal/pelvic ascites noted.  7.  Extensive calcific atherosclerosis. Additional ancillary findings detailed above.    1/16/2025 vascular venous duplex lower limb    RIGHT LOWER LIMB  Evidence of chronic non-occlusive deep vein thrombosis in one of the pair  peroneal veins in the mid calf, however technically difficult to visualize  secondary to pitting edema.  No evidence of superficial thrombophlebitis noted.  Doppler evaluation shows a normal response to augmentation maneuvers.  Popliteal, posterior tibial and anterior tibial arterial Doppler waveforms are  triphasic/biphasic.     LEFT LOWER LIMB LIMITED  Evaluation shows no evidence of thrombus in the common femoral vein.  Doppler evaluation shows a normal response to augmentation maneuvers.       [1]   Current Outpatient Medications:     apixaban (Eliquis) 2.5 mg, Take 1 tablet (2.5 mg total) by mouth 2 (two) times a day, Disp: 60 tablet, Rfl: 3    aspirin 81 mg chewable tablet, Chew 1 tablet in the morning., Disp: , Rfl:     atorvastatin (LIPITOR) 40 mg tablet, Take 1 tablet (40 mg total) by mouth daily with dinner,  Disp: 90 tablet, Rfl: 3    D3-50 1.25 MG (17180 UT) capsule, TAKE 1 CAPSULE BY MOUTH ONE TIME PER WEEK, Disp: 12 capsule, Rfl: 4    metoprolol succinate (TOPROL-XL) 25 mg 24 hr tablet, Take 12.5 mg by mouth daily, Disp: , Rfl:     midodrine (PROAMATINE) 5 mg tablet, Take 3 tablets (15 mg total) by mouth 3 (three) times a day before meals, Disp: 810 tablet, Rfl: 0    prasugrel (EFFIENT) tablet, Take 1 tablet (10 mg total) by mouth daily, Disp: 90 tablet, Rfl: 3    collagenase (SANTYL) ointment, Apply topically daily (Patient not taking: Reported on 7/8/2025), Disp: 30 g, Rfl: 1  [2] No Known Allergies

## 2025-07-08 NOTE — PROGRESS NOTES
Daily Note     Today's date: 2025  Patient name: Asad Galloway  : 1960  MRN: [id number]  Referring provider: Mallorie Ulloa*  Dx:   Encounter Diagnosis     ICD-10-CM    1. Difficulty walking  R26.2       2. Weakness generalized  R53.1                               DO NOT OFFER WATER DUE TO FLUID RESTRICTION (dialysis)       Subjective: Patient arrives with RW and his wife (stopped using rollator because he was sitting on it and falling off it). He had TAVR last week, was hospitalized in [facility] from Monday-Thursday, discharged home with no restrictions. Still wearing LifeVest until follow up appt with cardiology. Pt's wife reports he has been requiring increased assistance at home, due to behavioral issues.     Objective: See treatment diary below    TE/TA:  - Nustep L1 x 7 min consecutively  - STS, 1 airex today: 5 reps x 2 sets, min-mod A  - Ambulation with RW: 80 ft x 3, CG  - Mini squats with RW: 10 reps      Assessment: Pt tolerated PT session well today. Increased time on Nustep to 7 min consecutively. Increased posterior lean during sit>stands today, continues to fluctuate from min-mod A. Pt cooperative throughout session, able to accomplish more today than usual (fewer rest breaks). Patient would benefit from continued PT to focus on gait and transfers, with progression to more balance training.      Plan: Continue per plan of care.      POC expires Unit limit Auth Expiration date PT/OT + Visit Limit? Co-Insurance      [facility] primary, 30p[facility] secondary Yes                                            Outcome Measures Initial Eval  24 Re-eval  10/10/24 Re eval   24 Re-eval  25 Re-eval  25 Re-eval  25 Re-eval  25   5xSTS 15.85 sec, 2 UE  15.41 sec, 2 UE  21.06 sec, pushing off thighs  46.97 sec pushing off thighs + mod A Only performed 3 reps, 25.63 sec 43.48 sec pushing off armrests + mod A 32.07 sec pushing off armrests + mod A   TUG  - Regular  - Cognitive   17.3 sec,  no AD  24.3 sec, (serial 3s)   12.59 sec, no AD  18.56 sec, no AD (serial 3s)   16. 44 sec, pushing off thighs  19.74 sec, serial 3s   22.11 sec with RW    27.53 sec with RW, serial 3s   24.52 sec with RW   43.59 sec with RW   26.82 sec with rollator   10 meter   1.18 m/s without AD   0.86 m/s with cane   0.71 m/s wo cane          BURGER 30/56 33/56 34/56 14/56 7/56 8/56 9/56   mCTSIB  - FTEO (firm)  - FTEC (firm)  - FTEO (foam)  - FTEC (foam)   30 sec +  30 ++  2 sec  0 sec   30 sec+  30 sec+  3 sec  0 sec   30 sec+  30 sec+  3 sec   0 sec       6MWT 100 ft (attempted but only able to complete 1 minute) 275 ft in 4 minutes  212 ft in 2 minutes w no cane   2MWT: 84 ft with RW and CG 2MWT: 104 ft with RW and min A 2MWT: 127 ft with min A and rollator, pt stopped at 1 min 41 sec   ABC 47.5% 81.88% 72.5%

## 2025-07-09 ENCOUNTER — TELEPHONE (OUTPATIENT)
Age: 65
End: 2025-07-09

## 2025-07-09 DIAGNOSIS — F39 MOOD DISORDER (HCC): Primary | Chronic | ICD-10-CM

## 2025-07-09 NOTE — TELEPHONE ENCOUNTER
Patient's wife called for refill of Depakote.  It is not on current med list.  Please call patient if discontinued.

## 2025-07-10 ENCOUNTER — OFFICE VISIT (OUTPATIENT)
Dept: WOUND CARE | Facility: HOSPITAL | Age: 65
End: 2025-07-10
Payer: MEDICARE

## 2025-07-10 VITALS
HEART RATE: 93 BPM | RESPIRATION RATE: 18 BRPM | TEMPERATURE: 96.7 F | DIASTOLIC BLOOD PRESSURE: 63 MMHG | SYSTOLIC BLOOD PRESSURE: 141 MMHG

## 2025-07-10 DIAGNOSIS — L97.512 ULCER OF TOE OF RIGHT FOOT, WITH FAT LAYER EXPOSED (HCC): Primary | ICD-10-CM

## 2025-07-10 DIAGNOSIS — E11.40 TYPE 2 DIABETES MELLITUS WITH DIABETIC NEUROPATHY, UNSPECIFIED WHETHER LONG TERM INSULIN USE (HCC): ICD-10-CM

## 2025-07-10 DIAGNOSIS — I73.9 PERIPHERAL ARTERIAL DISEASE (HCC): ICD-10-CM

## 2025-07-10 PROCEDURE — 11042 DBRDMT SUBQ TIS 1ST 20SQCM/<: CPT | Performed by: PODIATRIST

## 2025-07-10 RX ORDER — LIDOCAINE 40 MG/G
CREAM TOPICAL ONCE
Status: COMPLETED | OUTPATIENT
Start: 2025-07-10 | End: 2025-07-10

## 2025-07-10 RX ORDER — SODIUM HYPOCHLORITE 1.25 MG/ML
SOLUTION TOPICAL
Qty: 473 ML | Refills: 0 | Status: SHIPPED | OUTPATIENT
Start: 2025-07-10

## 2025-07-10 RX ADMIN — LIDOCAINE: 40 CREAM TOPICAL at 10:27

## 2025-07-10 NOTE — PROGRESS NOTES
Wound Procedure Treatment Anterior;Right Toe D1, great    Performed by: Carly Marcelo RN  Authorized by: Franklin Church DPM  Associated wounds:   Wound 06/26/25 Diabetic Ulcer Toe D1, great Anterior;Right    Wound cleansed with:  NSS and Dakin's 0.25%   Applied secondary dressing:  Gauze   Dressing secured with:  Tape   Offloading device appllied:  Surgical shoe   Comments:  Dakins moistened gauze to wound base

## 2025-07-10 NOTE — PATIENT INSTRUCTIONS
Orders Placed This Encounter   Procedures    Wound cleansing and dressings Anterior;Right Toe D1, great     Wound cleansing and dressings Anterior;Right Toe D1, great   Right Great toe wound:     Ok to shower with wound uncovered. Recommend using unscented dove. Gently cleanse, rinse well, and pat dry. Apply new dressing immediately.  Do not leave open to air    Apply dakins moistened gauze to the right great toe wound.  Cover with gauze  Secure with roll gauze  Change dressing daily       Elastic Tubular Stocking--size F      Tubular elastic bandage: Apply from base of toes to behind the knee. Apply in AM, may remove for sleep.     Avoid prolonged standing in one place.     Elevate leg(s) above the level of the heart when sitting or as much as possible.     Standing Status:   Future     Expiration Date:   7/17/2025

## 2025-07-10 NOTE — TELEPHONE ENCOUNTER
Pt's Depakote was discontinued by cardiologist in [place] secondary to thrombocytopenia and dx of Cirrhosis

## 2025-07-10 NOTE — PROGRESS NOTES
"Patient ID: Asad Galloway is a 65 y.o. male Date of Birth 1960       Chief Complaint   Patient presents with    Follow Up Wound Care Visit       Allergies:  Patient has no known allergies.    Assessments:      Diagnosis ICD-10-CM Associated Orders   1. Ulcer of toe of right foot, with fat layer exposed ([facility])  L97.512 lidocaine (LMX) 4 % cream     Wound cleansing and dressings Anterior;Right Toe D1, great     Wound Procedure Treatment Anterior;Right Toe D1, great     Debridement Anterior;Right Toe D1, great     sodium hypochlorite (DAKIN'S QUARTER-STRENGTH)      2. Type 2 diabetes mellitus with diabetic neuropathy, unspecified whether long term insulin use ([facility])  E11.40 Debridement Anterior;Right Toe D1, great      3. Peripheral arterial disease ([facility])  I73.9            Plan:   Reviewed medical records.  Patient was counseled and educated on the condition and the diagnosis.    2. The diagnosis, treatment options and prognosis were discussed with the patient.    3. Hold Santyl.  Will use Dakin for 2 weeks.  Pt to keep the dressing intact all times. Stressed on patient compliance about proper off-loading, and staying off of foot to reduce the risk of infection, non-healing, and limb loss.    4. Patient will return in 2 weeks for re-evaluation.         Imaging: I have personally reviewed pertinent films in PACS  Labs, pathology, and Other Studies: I have personally reviewed pertinent reports.        Debridement   Wound 06/26/25 Diabetic Ulcer Toe D1, great Anterior;Right     Date/Time: 7/10/2025 10:00 AM    Universal Protocol:  Consent: Verbal consent obtained  Risks and benefits: risks, benefits and alternatives were discussed  Consent given by: patient  Time out: Immediately prior to procedure a \"time out\" was called to verify the correct patient, procedure, equipment, support staff and site/side marked as required.  Timeout called at: 7/10/2025 10:43 AM.  Patient understanding: patient states understanding of " the procedure being performed  Patient identity confirmed: verbally with patient    Debridement Details  Performed by: physician  Debridement type: surgical  Level of debridement: subcutaneous tissue  Pain control: lidocaine 4%    Post-debridement measurements  Length (cm): 1  Width (cm): 1  Depth (cm): 0.2  Percent debrided: 100%  Surface Area (cm^2): 0.79  Area Debrided (cm^2): 0.79  Volume (cm^3): 0.1    Tissue and other material debrided: dermis, epidermis and subcutaneous tissue  Devitalized tissue debrided: biofilm, callus, fibrin and slough  Instrument(s) utilized: blade  Technique utilized: excisional  Bleeding: small  Hemostasis obtained with: pressure  Procedural pain (0-10): 0  Post-procedural pain: 0   Response to treatment: procedure was tolerated well           Subjective:   HPI  The patient presents for evaluation and treatment of wound in right great toe.  Not compliant about keeping the compression or dressing.  No pain.  BS under control.         The following portions of the patient's history were reviewed and updated as appropriate:   Problem List[1]  Past Medical History[2]  Past Surgical History[3]  Family History[4]   Social History[5]   Current Medications[6]    Review of Systems   Constitutional:  Negative for chills and fever.   Respiratory:  Negative for cough and shortness of breath.    Cardiovascular:  Negative for chest pain.   Gastrointestinal:  Negative for nausea and vomiting.   Neurological:  Positive for numbness. Negative for weakness.       Objective:  /63   Pulse 93   Temp (!) 96.7 °F (35.9 °C)   Resp 18   Pain Score:   5     Physical Exam  Vitals and nursing note reviewed.   Constitutional:       General: He is not in acute distress.     Appearance: He is not toxic-appearing or diaphoretic.     Cardiovascular:      Rate and Rhythm: Normal rate and regular rhythm.      Pulses:           Dorsalis pedis pulses are 0 on the right side and 1+ on the left side.         Posterior tibial pulses are 0 on the right side and 0 on the left side.   Pulmonary:      Effort: Pulmonary effort is normal. No respiratory distress.     Musculoskeletal:         General: Deformity present. No signs of injury.      Right lower leg: Edema present.      Left lower leg: Edema present.      Right foot: No Charcot foot or foot drop.      Left foot: No Charcot foot or foot drop.      Comments: S/P partial amp right 3rd toe.   Feet:      Right foot:      Skin integrity: Dry skin present. No ulcer.      Left foot:      Skin integrity: Dry skin present. No ulcer.     Skin:     General: Skin is warm and dry.      Capillary Refill: Capillary refill takes less than 2 seconds.      Coloration: Skin is not cyanotic or mottled.      Findings: No abscess or erythema.      Nails: There is no clubbing.      Comments: He has Oliveros 1 ulcer in right great toe.  Wound bed is fibrous.  No deep probing.  No redness or purulence.  No significant improvement.  See wound assessment and photo.     Neurological:      General: No focal deficit present.      Mental Status: He is alert and oriented to person, place, and time.      Cranial Nerves: No cranial nerve deficit.      Sensory: Sensory deficit present.      Motor: No weakness.      Coordination: Coordination normal.     Psychiatric:         Mood and Affect: Mood normal.         Behavior: Behavior normal.         Thought Content: Thought content normal.         Judgment: Judgment normal.           Wound 06/26/25 Diabetic Ulcer Toe D1, great Anterior;Right (Active)   Wound Image Images linked 07/10/25 1056   Enter Oliveros score: Oliveros Grade 1: Partial or full-thickness ulcer (superficial) 07/10/25 1025   Wound Description Eschar;Brown;Slough;Yellow;Pink 07/10/25 1025   Non-staged Wound Description Full thickness 07/10/25 1025   Wound Length (cm) 1 cm 07/10/25 1025   Wound Width (cm) 0.9 cm 07/10/25 1025   Wound Depth (cm) 0.2 cm 07/10/25 1025   Wound Surface Area (cm^2)  0.71 cm^2 07/10/25 1025   Wound Volume (cm^3) 0.094 cm^3 07/10/25 1025   Calculated Wound Volume (cm^3) 0.18 cm^3 07/10/25 1025   Change in Wound Size % -80 07/10/25 1025   Drainage Amount Moderate 07/10/25 1025   Drainage Description Yellow;Tan 07/10/25 1025   Lani-wound Assessment Scaly;Pink;Dry 07/10/25 1025                 Lab Results   Component Value Date    HGBA1C 5.4 04/10/2025       Wound Instructions:  Orders Placed This Encounter   Procedures    Wound cleansing and dressings Anterior;Right Toe D1, great     Wound cleansing and dressings Anterior;Right Toe D1, great   Right Great toe wound:     Ok to shower with wound uncovered. Recommend using unscented dove. Gently cleanse, rinse well, and pat dry. Apply new dressing immediately.  Do not leave open to air    Apply dakins moistened gauze to the right great toe wound.  Cover with gauze  Secure with roll gauze  Change dressing daily       Elastic Tubular Stocking--size F      Tubular elastic bandage: Apply from base of toes to behind the knee. Apply in AM, may remove for sleep.     Avoid prolonged standing in one place.     Elevate leg(s) above the level of the heart when sitting or as much as possible.     Standing Status:   Future     Expiration Date:   7/17/2025    Wound Procedure Treatment Anterior;Right Toe D1, great     This order was created via procedure documentation    Debridement Anterior;Right Toe D1, great     This order was created via procedure documentation             Franklin Church DPM         [1]   Patient Active Problem List  Diagnosis    Dizziness    Mixed hyperlipidemia    Antiplatelet or antithrombotic long-term use    Nephrotic range proteinuria    Elevated alkaline phosphatase level    GERD (gastroesophageal reflux disease)    Other constipation    Abnormal EEG    History of stroke in adulthood    Anxiety associated with depression    Urge incontinence    S/P arteriovenous (AV) fistula creation    Pre-kidney transplant, listed    Anemia     History of 2019 novel coronavirus disease (COVID-19)    ESRD on dialysis (HCC)    Emotional lability    Primary hypertension    Generalized weakness    Chest pain    Electrolyte abnormality    CAD, multiple vessel    SOB (shortness of breath)    Left arm swelling    Fall    Hyponatremia    Ischemic cardiomyopathy    Aortic stenosis    Mood disorder (HCC)    Diabetic polyneuropathy associated with type 2 diabetes mellitus (HCC)    Absence of toe of right foot (HCC)    Hammer toes of both feet    Presence of external cardiac defibrillator    Rectal bleeding    Edema of left lower extremity    Chronic systolic heart failure (HCC)    Old myocardial infarction    Hypertensive heart and chronic kidney disease with heart failure and with stage 5 chronic kidney disease, or end stage renal disease (HCC)    PAD (peripheral artery disease) (HCC)    Pre-diabetes    Right ankle pain    Acute embolism and thrombosis of right peroneal vein (HCC)    Bicytopenia    Obesity (BMI 30.0-34.9)    QT prolongation    Chronic deep vein thrombosis (DVT) of distal vein of right lower extremity (HCC)    Thrombocytopenia (HCC)    Type 2 diabetes mellitus (HCC)    Ambulatory dysfunction    Peripheral arterial disease (HCC)    Chronic kidney disease-mineral and bone disorder    Anemia due to chronic kidney disease, on chronic dialysis (HCC)    HFrEF (heart failure with reduced ejection fraction) (HCC)    SIRS (systemic inflammatory response syndrome) (HCC)    Acute encephalopathy    Fluid overload    Hypotension    Cirrhosis (HCC)    Hyperkalemia    Sarcopenia    Impaired mobility and ADLs    Shock (HCC)    Goals of care, counseling/discussion    Nonrheumatic aortic valve stenosis    Encounter for dialysis ([facility])    Palliative care by specialist    Finger lesion    Ulcer of toe of right foot, with fat layer exposed (HCC)   [2]   Past Medical History:  Diagnosis Date    Cerebrovascular accident (CVA) due to thrombosis of left middle cerebral  artery ([facility]) 07/29/2018    Chronic kidney disease     Coronary artery disease     Diabetes mellitus (HCC)     not on meds    Dialysis patient ([facility])     M-W-F    Fistula     left upper arm for hemodialyis    GERD (gastroesophageal reflux disease)     History of coronary artery stent placement     x3    Hypercholesteremia     Hyperlipidemia     Hypertension     Infectious viral hepatitis     B as child    Limb alert care status     no BP/IV left arm    Neuropathy     Nonhealing ulcer of heel ([facility]) 04/25/2023    Obesity     Osteomyelitis ([facility])     last assessed 11/4/16-per son not currently    Penetrating foot wound, left, initial encounter 01/19/2022    PVC's (premature ventricular contractions)     sees  Cardio    Stroke ([facility])     last weeof July 2018 [facility]    TIA (transient ischemic attack) 10/28/2018    Ulcer of left heel, limited to breakdown of skin ([facility]) 06/13/2024    Wears dentures     Wears glasses    [3]   Past Surgical History:  Procedure Laterality Date    ABDOMINAL SURGERY      CARDIAC CATHETERIZATION N/A 05/02/2022    Procedure: Cardiac Coronary Angiogram;  Surgeon: Sam Davis MD;  [facility];  Service: Cardiology    CARDIAC CATHETERIZATION N/A 05/02/2022    Procedure: Cardiac pci;  Surgeon: Sam Davis MD;  [facility];  Service: Cardiology    CARDIAC CATHETERIZATION  02/01/2023    Procedure: Cardiac catheterization;  Surgeon: Sam Davis MD;  [facility];  Service: Cardiology    CARDIAC CATHETERIZATION N/A 02/01/2023    Procedure: Cardiac pci;  Surgeon: Sam Davis MD;  [facility];  Service: Cardiology    CARDIAC CATHETERIZATION N/A 02/01/2023    Procedure: Cardiac Coronary Angiogram;  Surgeon: Sam Davis MD;  [facility];  Service: Cardiology    CARDIAC CATHETERIZATION N/A 02/01/2023    Procedure: Cardiac other-IVUS;  Surgeon: Sam Davis MD;  [facility]: [facility];   "Service: Cardiology    CHOLECYSTECTOMY      Percutaneous    COLONOSCOPY      CORONARY ANGIOPLASTY WITH STENT PLACEMENT      CYSTOSCOPY      HEMODIALYSIS ADULT  1/22/2024    HEMODIALYSIS ADULT  01/24/2024    HEMODIALYSIS ADULT  5/19/2025    IR LOWER EXTREMITY ANGIOGRAM  9/29/2023    IR LOWER EXTREMITY ANGIOGRAM  02/26/2024    OTHER SURGICAL HISTORY      \"stimulator to control bowel movements\"    DC ESOPHAGOGASTRODUODENOSCOPY TRANSORAL DIAGNOSTIC N/A 09/27/2016    Procedure: ESOPHAGOGASTRODUODENOSCOPY (EGD);  Surgeon: Adele Rowe MD;  Location: [facility] GI [facility];  Service: Gastroenterology    DC LAPAROSCOPY SURG CHOLECYSTECTOMY N/A 02/29/2016    Procedure: LAPAROSCOPIC CHOLECYSTECTOMY ;  Surgeon: Cliff Roman DO;  Location: [facility];  Service: General    DC SLCTV CATHJ 3RD+ ORD SLCTV ABDL PEL/LXTR BRNCH Left 9/29/2023    Procedure: Left leg angiogram with intervention;  Surgeon: Michelle Galvan MD;  Location: [facility];  Service: Vascular    DC SLCTV CATHJ 3RD+ ORD SLCTV ABDL PEL/LXTR BRNCH Left 02/26/2024    Procedure: Left leg angiogram antegrade access, left popliteal angioplasty;  Surgeon: Michelle Galvan MD;  Location: [facility];  Service: Vascular    ROTATOR CUFF REPAIR Right     SPINAL CORD STIMULATOR IMPLANT      \"Medtronic interstim model # 3058- in lower back to control bowel movements-currently turned off-battery is dead\"    TOE AMPUTATION Right 10/28/2016    Procedure: 3RD TOE AMPUTATION ;  Surgeon: Anjel Salas DPM;  Location: [facility];  Service:    [4]   Family History  Problem Relation Name Age of Onset    Leukemia Mother      Liver disease Mother      Lung cancer Mother          heavy smoker - 3 ppd    Heart disease Father Son     Liver disease Father Son     Diabetes type I Father Son     Multiple myeloma Sister      Breast cancer Sister      Urolithiasis Family      Alcohol abuse Neg Hx      Depression Neg Hx      Drug abuse Neg Hx      Substance Abuse Neg Hx      Mental illness " Neg Hx     [5]   Social History  Socioeconomic History    Marital status: /Civil Union    Highest education level: Not asked   Occupational History    Occupation: disabled   Tobacco Use    Smoking status: Never    Smokeless tobacco: Never   Vaping Use    Vaping status: Never Used   Substance and Sexual Activity    Alcohol use: Never    Drug use: No    Sexual activity: Not Currently     Partners: Female     Comment: defer   Social History Narrative    Daily caffeine consumption 2-3 servings a day     Social Drivers of Health     Financial Resource Strain: Patient Declined (7/13/2023)    Overall Financial Resource Strain (CARDIA)     Difficulty of Paying Living Expenses: Patient declined   Food Insecurity: No Food Insecurity (7/2/2025)    Received from [facility], and [facility]    Hunger Vital Sign     Within the past 12 months, you worried that your food would run out before you got the money to buy more.: Never true     Within the past 12 months, the food you bought just didn't last and you didn't have money to get more.: Never true   Transportation Needs: Unknown (7/2/2025)    Received from [facility], [facility], and [facility]    PRAPARE - Transportation     Lack of Transportation (Medical): No   Stress: No Stress Concern Present (1/18/2019)    [employer] [place] [employer] - Occupational Stress Questionnaire     Feeling of Stress : Only a little   Social Connections: Unknown (1/18/2019)    Social Connection and Isolation Panel     Marital Status:    Intimate Partner Violence: Unknown (5/22/2025)    Nursing IPS     Physically Hurt by Someone: No     Hurt or Threatened by Someone: No   Housing Stability: Unknown (7/2/2025)    Received from [facility], [facility], and [facility]    Housing Stability Vital Sign     At any time in the past 12 months, were you  homeless or living in a shelter (including now)?: No   [6]   Current Outpatient Medications:     sodium hypochlorite (DAKIN'S QUARTER-STRENGTH), Moisten dry gauze with Dakin solution and apply to the wound daily., Disp: 473 mL, Rfl: 0    apixaban (Eliquis) 2.5 mg, Take 1 tablet (2.5 mg total) by mouth 2 (two) times a day, Disp: 60 tablet, Rfl: 3    aspirin 81 mg chewable tablet, Chew 1 tablet in the morning., Disp: , Rfl:     atorvastatin (LIPITOR) 40 mg tablet, Take 1 tablet (40 mg total) by mouth daily with dinner, Disp: 90 tablet, Rfl: 3    collagenase (SANTYL) ointment, Apply topically daily (Patient not taking: Reported on 7/8/2025), Disp: 30 g, Rfl: 1    D3-50 1.25 MG (71485 UT) capsule, TAKE 1 CAPSULE BY MOUTH ONE TIME PER WEEK, Disp: 12 capsule, Rfl: 4    metoprolol succinate (TOPROL-XL) 25 mg 24 hr tablet, Take 12.5 mg by mouth daily, Disp: , Rfl:     midodrine (PROAMATINE) 5 mg tablet, Take 3 tablets (15 mg total) by mouth 3 (three) times a day before meals, Disp: 810 tablet, Rfl: 0    prasugrel (EFFIENT) tablet, Take 1 tablet (10 mg total) by mouth daily, Disp: 90 tablet, Rfl: 3  No current facility-administered medications for this visit.

## 2025-07-14 PROBLEM — R23.4: Status: ACTIVE | Noted: 2025-07-14

## 2025-07-15 ENCOUNTER — OFFICE VISIT (OUTPATIENT)
Dept: GASTROENTEROLOGY | Facility: AMBULARY SURGERY CENTER | Age: 65
End: 2025-07-15
Payer: MEDICARE

## 2025-07-15 ENCOUNTER — OFFICE VISIT (OUTPATIENT)
Facility: CLINIC | Age: 65
End: 2025-07-15
Attending: PHYSICIAN ASSISTANT
Payer: MEDICARE

## 2025-07-15 ENCOUNTER — OFFICE VISIT (OUTPATIENT)
Facility: CLINIC | Age: 65
End: 2025-07-15
Payer: MEDICARE

## 2025-07-15 VITALS
SYSTOLIC BLOOD PRESSURE: 112 MMHG | HEART RATE: 61 BPM | OXYGEN SATURATION: 98 % | DIASTOLIC BLOOD PRESSURE: 62 MMHG | HEIGHT: 69 IN | BODY MASS INDEX: 29.83 KG/M2

## 2025-07-15 DIAGNOSIS — R26.2 DIFFICULTY WALKING: ICD-10-CM

## 2025-07-15 DIAGNOSIS — E78.2 MIXED HYPERLIPIDEMIA: ICD-10-CM

## 2025-07-15 DIAGNOSIS — I69.319 COGNITIVE DEFICITS FOLLOWING CEREBRAL INFARCTION: ICD-10-CM

## 2025-07-15 DIAGNOSIS — E11.42 DIABETIC POLYNEUROPATHY ASSOCIATED WITH TYPE 2 DIABETES MELLITUS (HCC): ICD-10-CM

## 2025-07-15 DIAGNOSIS — Z86.73 H/O: STROKE: Primary | ICD-10-CM

## 2025-07-15 DIAGNOSIS — R18.8 CIRRHOSIS OF LIVER WITH ASCITES, UNSPECIFIED HEPATIC CIRRHOSIS TYPE  (HCC): Primary | ICD-10-CM

## 2025-07-15 DIAGNOSIS — Z78.9 SELF-CARE DEFICIT: ICD-10-CM

## 2025-07-15 DIAGNOSIS — K74.60 CIRRHOSIS OF LIVER WITH ASCITES, UNSPECIFIED HEPATIC CIRRHOSIS TYPE  (HCC): Primary | ICD-10-CM

## 2025-07-15 DIAGNOSIS — K74.69 OTHER CIRRHOSIS OF LIVER (HCC): ICD-10-CM

## 2025-07-15 DIAGNOSIS — N18.6 ESRD ON DIALYSIS (HCC): ICD-10-CM

## 2025-07-15 DIAGNOSIS — K75.81 METABOLIC DYSFUNCTION-ASSOCIATED STEATOHEPATITIS (MASH): ICD-10-CM

## 2025-07-15 DIAGNOSIS — R53.1 WEAKNESS GENERALIZED: Primary | ICD-10-CM

## 2025-07-15 DIAGNOSIS — Z99.2 ESRD ON DIALYSIS (HCC): ICD-10-CM

## 2025-07-15 PROCEDURE — 97530 THERAPEUTIC ACTIVITIES: CPT | Performed by: PHYSICAL THERAPIST

## 2025-07-15 PROCEDURE — 97110 THERAPEUTIC EXERCISES: CPT | Performed by: PHYSICAL THERAPIST

## 2025-07-15 PROCEDURE — G2211 COMPLEX E/M VISIT ADD ON: HCPCS | Performed by: STUDENT IN AN ORGANIZED HEALTH CARE EDUCATION/TRAINING PROGRAM

## 2025-07-15 PROCEDURE — 97530 THERAPEUTIC ACTIVITIES: CPT

## 2025-07-15 PROCEDURE — 99204 OFFICE O/P NEW MOD 45 MIN: CPT | Performed by: STUDENT IN AN ORGANIZED HEALTH CARE EDUCATION/TRAINING PROGRAM

## 2025-07-15 RX ORDER — DIVALPROEX SODIUM 125 MG/1
1 TABLET, DELAYED RELEASE ORAL 2 TIMES DAILY
COMMUNITY
Start: 2025-07-14

## 2025-07-15 NOTE — LETTER
2025     Dayanara Holbrook PA-C  [address] [facility] [address]    Patient: Asad Galloway   YOB: 1960   Date of Visit: 7/15/2025       Dear Dr. Dayanara Holbrook PA-C:    Thank you for referring Asad Galloway to me for evaluation. Below are my notes for this consultation.    If you have questions, please do not hesitate to call me. I look forward to following your patient along with you.         Sincerely,        Chapis Perry MD        CC: No Recipients    Shayla Vinson MD  7/15/2025 11:11 PM  Attested  Name: Asad Galloway      : 1960      MRN: [id number]  Encounter Provider: Chapis Perry MD  Encounter Date: 7/15/2025   Encounter department: [facility] [place]  :  Assessment & Plan  Cirrhosis of liver with ascites, unspecified hepatic cirrhosis type  (HCC)  Metabolic dysfunction-associated steatohepatitis (MASH)  Other cirrhosis of liver (HCC)  Mixed hyperlipidemia  ESRD on dialysis (HCC)  Diabetic polyneuropathy associated with type 2 diabetes mellitus (HCC)  In summary, this is a 64yo M with an extensive PMHx including ESRD on HD, HTN, type 2 diabetes, HLD, ischemic cardiomyopathy LVEF previously 25-30%; improved to >40%  s/p LifeVest,  complex CAD s/p multiple ADE with in-stent stenosis now on effient and aspirin, DVT on Eliquis, severe aortic stenosis s/p TAVR on 25, CVA, chronic hypotension on midodrine, non healing extremity ulcers who presents to hepatology clinic to establish care    He he was seen by gastroenterology in 2016 and was diagnosed with suspected MASH cirrhosis of the intraoperative findings of cirrhotic liver morphology during laparoscopic cholecystectomy.  His EGD at that time was negative for esophageal varices.  And chronic liver disease workup was relatively unrevealing for secondary cause.  He was previously for renal transplant with [facility] but was deemed too high risk due to severe aortic stenosis, complex coronary  artery disease and and feelings.  Transplant evaluation is currently on hold at this time.  He recently underwent an uncomplicated TAVR at [facility] on 7/1/2025.  Otherwise, he denies any symptoms concerning for decompensated cirrhosis including ascites, hepatic encephalopathy, hematemesis.    Most recent labs showed AST 7, ALT 5, , total bilirubin 0.8, platelet count 82, hemoglobin 9.8, INR 1.3 and ferritin elevated between 900s-1126.     MELD 3.0: 21 at 5/23/2025  5:19 AM  MELD-Na: 23 at 5/23/2025  5:19 AM  Calculated from:  Serum Creatinine: 7.1 mg/dL (Using max of 3 mg/dL) at 5/23/2025  5:19 AM  Serum Sodium: 137 mmol/L at 5/23/2025  5:19 AM  Total Bilirubin: 0.87 mg/dL (Using min of 1 mg/dL) at 5/21/2025  6:00 AM  Serum Albumin: 3.8 g/dL (Using max of 3.5 g/dL) at 5/21/2025  6:00 AM  INR(ratio): 1.34 at 5/21/2025  6:00 AM  Age at listing (hypothetical): 65 years  Sex: Male at 5/23/2025  5:19 AM      We discussed natural history, prognosis, and complications of cirrhosis, including decompensation in the form of ascites, variceal bleeding, and hepatic encephalopathy as well as risk for development of HCC.      - Check HFE mutation with his elevated ferritin although, suspect is  likely related to chronic inflammatory state and ESRD.  - Check A1AT phenotype for completeness  - CMP, CBC and INR in 6 months    Varices:  EGD 09/2016 negative for varices  But has has new ascites seen on imaging since then which can be indicative of decompensation.  Given in significant comorbid medical conditions, recent TAVR and use of LifeVest, will hold off EGD for variceal screening for now until he has been reevaluated by cardiology and has had stable improvement in his cardiac function.  He is currently on aspirin and Effient with plan for Eliquis to be discontinued in the next several weeks.  Ascites:  Seen on imaging from a CT abdomen and pelvis in April 2025. No prior paracentesis  Will plan for right upper  quadrant ultrasound to assess for ascites and if present, recommend IR paracentesis with fluid studies  2 g Sodium diet    Encephalopathy:  No previous history of or current concern for hepatic encephalopathy  Patient is currently on Depakote as a mood stabilizer.  If clinically indicated for his mood disorder, no contraindication to holding from a hepatology perspective.    HCC Screening:  CT AP trauma scan 4/17/25 without suspicious hepatic lesion  Check right upper quadrant ultrasound and AFP now  Continues screening with US/AFP every 6 months.  Transplant Candidacy:  We discussed with the patient and his family that if he is ever going to undergo evaluation for renal transplant again,  he will likely need a transjugular liver biopsy with measurement of HVPG as he might likely need a dual  liver-kidney transplant  They plan to follow up with Transplant nephrology at [facility].Last seen by them in December 2024 and was deemed high risk candidate due to CAD, frailty, aortic stenosis. Recommended repeat LH cath and clearance for aortic stenosis      Orders:  •  Alpha 1 Antitrypsin Phenotype  •  Comprehensive metabolic panel  •  CBC  •  Protime-INR  •  AFP tumor marker  •  Ambulatory referral to Gastroenterology  •  US right upper quadrant  •  Hemochromatosis mutation        History of Present Illness  Asad Galloway is a 65 y.o. male with a past medical history of ESRD on HD not currently listed for transplant, HTN, type 2 diabetes, HLD, ischemic cardiomyopathy LVEF previously 25-30%; improved to >40%  with LifeVest,  complex CAD s/p multiple ADE with in-stent stenosis now on effient and aspirin, DVT on Eliquis, severe aortic stenosis s/p TAVR on 07/1/25, CVA, chronic hypotension on midodrine, non healing extremity ulcers who presents to hepatology clinic to establish care    Has a past medical history of MASH cirrhosis diagnosed in 2016 when he was seen by Dr. Rowe..  At that time he had a  "laparoscopic cholecystectomy where he was noted to have cirrhotic liver morphology intraoperatively and was referred to GI for follow-up.  Cirrhotic liver pathology was also noted on his CT abdomen and pelvis.  He underwent chronic liver disease workup including negative hepatitis panel, SOLEDAD, antimitochondrial antibody, normal ferritin and low AFP.  He was recommended to obtain an EGD which did not reveal esophageal varices.  He was compensated at that time and etiology of the cirrhosis was thought to be secondary to MASLD      He was since lost to follow up GI due to his other medical complications. He was seen again by GI in 2023 however, this was for rectal bleeding in the setting of Effient use. Colonoscopy showed diverticulosis but otherwise normal      Since then, he developed ESRD 2/2 hypertensive and diabetic nephropathy. He was previously listed for renal transplant with [facility] but is on hold right now due to his severe CAD on Effient and other additional medical co morbidities including severe aortic stenosis. He denies alcohol or tobacco use. Per his family, he hepatitis B as a child. Otherwise, no family history of liver disease. Denies worsening LE edema, abdominal distention, confusion, jaundice or scleral icterus. He recently underwent TAVR procedure at [facility] on 7/1/25.         HPI    Review of Systems A complete review of systems is negative other than that noted above in the HPI.      Current Medications[1]  Objective  /62 (BP Location: Left arm, Patient Position: Sitting, Cuff Size: Standard)   Pulse 61   Ht 5' 9\" (1.753 m)   SpO2 98%   BMI 29.83 kg/m²     Physical Exam  Vitals and nursing note reviewed.   Constitutional:       General: He is not in acute distress.     Appearance: He is well-developed.   HENT:      Head: Normocephalic and atraumatic.     Eyes:      Conjunctiva/sclera: Conjunctivae normal.       Cardiovascular:      Rate and Rhythm: Normal rate and regular " rhythm.      Heart sounds: No murmur heard.  Pulmonary:      Effort: Pulmonary effort is normal. No respiratory distress.      Breath sounds: Normal breath sounds.   Abdominal:      Palpations: Abdomen is soft.      Tenderness: There is no abdominal tenderness.     Musculoskeletal:         General: No swelling.      Cervical back: Neck supple.      Comments: L AV fistula.     Multiple extremity wounds with overlying eschar     Skin:     General: Skin is warm and dry.      Capillary Refill: Capillary refill takes less than 2 seconds.     Neurological:      Mental Status: He is alert.     Psychiatric:         Mood and Affect: Mood normal.            Lab Results: I personally reviewed relevant lab results.           Shayla Vinson MD  Gastroenterology Fellow, PGY- 5  Available on EPIC Secure Chat  7/15/2025 11:11 PM           [1]   Current Outpatient Medications   Medication Sig Dispense Refill   • apixaban (Eliquis) 2.5 mg Take 1 tablet (2.5 mg total) by mouth 2 (two) times a day 60 tablet 3   • aspirin 81 mg chewable tablet Chew 1 tablet in the morning.     • atorvastatin (LIPITOR) 40 mg tablet Take 1 tablet (40 mg total) by mouth daily with dinner 90 tablet 3   • D3-50 1.25 MG (55224 UT) capsule TAKE 1 CAPSULE BY MOUTH ONE TIME PER WEEK 12 capsule 4   • divalproex sodium (Depakote) 125 mg DR tablet Take 1 tablet by mouth 2 (two) times a day     • metoprolol succinate (TOPROL-XL) 25 mg 24 hr tablet Take 12.5 mg by mouth daily     • midodrine (PROAMATINE) 5 mg tablet Take 3 tablets (15 mg total) by mouth 3 (three) times a day before meals 810 tablet 0   • prasugrel (EFFIENT) tablet Take 1 tablet (10 mg total) by mouth daily 90 tablet 3   • sodium hypochlorite (DAKIN'S QUARTER-STRENGTH) Moisten dry gauze with Dakin solution and apply to the wound daily. 473 mL 0   • collagenase (SANTYL) ointment Apply topically daily (Patient not taking: Reported on 7/8/2025) 30 g 1     No current facility-administered medications  for this visit.     Attestation signed by Chapis Perry MD at 7/17/2025 12:35 PM:  Attending Attestation:    I reviewed the care furnished by the Resident/Fellow during the visit on 7/15/2025.  I was present in the office and personally saw and examined the patient.    65-year-old male with history of ESRD on HD, T2DM, CAD s/p ADE c/b ISR on effient and ASA, I-CMY s/p LifeVest, DVT on Eliquis, severe AS s/p TAVR (7/1/25) and chronic hypotension who presents for MASH cirrhosis.    He was previously seen by GI for suspected cirrhosis seen on intraoperative evaluation for lap CCY. He had an EGD which was negative for esophageal varices in 2016. Etiology was felt to be MASLD given his metabolic risk factors and negative serologic work-up, although previously noted to have elevated ferritin >1000.    He was previously evaluated for renal transplant at [facility] but was deemed high risk given his cardiac comorbidities and severe AS. He recently underwent TAVR with [facility] in July 2025 and is looking to reopen his evaluation.     He is otherwise asymptomatic from a liver perspective.  His labs were reviewed which showed normal serum transaminases, preserved synthetic function but chronic thrombocytopenia. His updated MELD (3.0) is 21 which is driven by his renal function.  He recently had a CT without contrast in May 2025 which demonstrated cirrhotic liver morphology and splenomegaly.    Presentation is highly suspicious for compensated FERNANDEZ cirrhosis but will send an HFE genetic testing to evaluate for hereditary hemochromatosis.  He has multiple metabolic risk factors but is also on valproic acid which is associated with microvesicular steatosis.     We discussed the natural history, prognosis, and complications of cirrhosis, including decompensation in the form of ascites, variceal bleeding, and hepatic encephalopathy as well as risk for development of HCC. He should have repeat US to evaluate for HCC and will defer EGD at this  time given that he is high risk for anesthesia. We did discuss the possibility of liver biopsy with HVPG measurements to determine if SLK is needed if his renal transplant evaluation is reopened.     He should return to liver clinic in 3-6 months or sooner if needed. Thank you for the opportunity to consult in his care.    Chapis Perry MD   [facility]  Gastroenterology and Hepatology

## 2025-07-15 NOTE — PROGRESS NOTES
Name: Asad Galloway      : 1960      MRN: [id number]  Encounter Provider: Chapis Perry MD  Encounter Date: 7/15/2025   Encounter department: [facility] [place]  :  Assessment & Plan  Cirrhosis of liver with ascites, unspecified hepatic cirrhosis type  (HCC)  Metabolic dysfunction-associated steatohepatitis (MASH)  Other cirrhosis of liver (HCC)  Mixed hyperlipidemia  ESRD on dialysis (HCC)  Diabetic polyneuropathy associated with type 2 diabetes mellitus (HCC)  In summary, this is a 66yo M with an extensive PMHx including ESRD on HD, HTN, type 2 diabetes, HLD, ischemic cardiomyopathy LVEF previously 25-30%; improved to >40%  s/p LifeVest,  complex CAD s/p multiple ADE with in-stent stenosis now on effient and aspirin, DVT on Eliquis, severe aortic stenosis s/p TAVR on 25, CVA, chronic hypotension on midodrine, non healing extremity ulcers who presents to hepatology clinic to establish care    He he was seen by gastroenterology in 2016 and was diagnosed with suspected MASH cirrhosis of the intraoperative findings of cirrhotic liver morphology during laparoscopic cholecystectomy.  His EGD at that time was negative for esophageal varices.  And chronic liver disease workup was relatively unrevealing for secondary cause.  He was previously for renal transplant with [facility] but was deemed too high risk due to severe aortic stenosis, complex coronary artery disease and and feelings.  Transplant evaluation is currently on hold at this time.  He recently underwent an uncomplicated TAVR at [facility] on 2025.  Otherwise, he denies any symptoms concerning for decompensated cirrhosis including ascites, hepatic encephalopathy, hematemesis.    Most recent labs showed AST 7, ALT 5, , total bilirubin 0.8, platelet count 82, hemoglobin 9.8, INR 1.3 and ferritin elevated between 900s-1126.     MELD 3.0: 21 at 2025  5:19 AM  MELD-Na: 23 at 2025  5:19 AM  Calculated  from:  Serum Creatinine: 7.1 mg/dL (Using max of 3 mg/dL) at 5/23/2025  5:19 AM  Serum Sodium: 137 mmol/L at 5/23/2025  5:19 AM  Total Bilirubin: 0.87 mg/dL (Using min of 1 mg/dL) at 5/21/2025  6:00 AM  Serum Albumin: 3.8 g/dL (Using max of 3.5 g/dL) at 5/21/2025  6:00 AM  INR(ratio): 1.34 at 5/21/2025  6:00 AM  Age at listing (hypothetical): 65 years  Sex: Male at 5/23/2025  5:19 AM      We discussed natural history, prognosis, and complications of cirrhosis, including decompensation in the form of ascites, variceal bleeding, and hepatic encephalopathy as well as risk for development of HCC.      - Check HFE mutation with his elevated ferritin although, suspect is  likely related to chronic inflammatory state and ESRD.  - Check A1AT phenotype for completeness  - CMP, CBC and INR in 6 months    Varices:  EGD 09/2016 negative for varices  But has has new ascites seen on imaging since then which can be indicative of decompensation.  Given in significant comorbid medical conditions, recent TAVR and use of LifeVest, will hold off EGD for variceal screening for now until he has been reevaluated by cardiology and has had stable improvement in his cardiac function.  He is currently on aspirin and Effient with plan for Eliquis to be discontinued in the next several weeks.  Ascites:  Seen on imaging from a CT abdomen and pelvis in April 2025. No prior paracentesis  Will plan for right upper quadrant ultrasound to assess for ascites and if present, recommend IR paracentesis with fluid studies  2 g Sodium diet    Encephalopathy:  No previous history of or current concern for hepatic encephalopathy  Patient is currently on Depakote as a mood stabilizer.  If clinically indicated for his mood disorder, no contraindication to holding from a hepatology perspective.    HCC Screening:  CT AP trauma scan 4/17/25 without suspicious hepatic lesion  Check right upper quadrant ultrasound and AFP now  Continues screening with US/AFP every  6 months.  Transplant Candidacy:  We discussed with the patient and his family that if he is ever going to undergo evaluation for renal transplant again,  he will likely need a transjugular liver biopsy with measurement of HVPG as he might likely need a dual  liver-kidney transplant  They plan to follow up with Transplant nephrology at [facility].Last seen by them in December 2024 and was deemed high risk candidate due to CAD, frailty, aortic stenosis. Recommended repeat LH cath and clearance for aortic stenosis      Orders:    Alpha 1 Antitrypsin Phenotype    Comprehensive metabolic panel    CBC    Protime-INR    AFP tumor marker    Ambulatory referral to Gastroenterology    US right upper quadrant    Hemochromatosis mutation        History of Present Illness   Asad Galloway is a 65 y.o. male with a past medical history of ESRD on HD not currently listed for transplant, HTN, type 2 diabetes, HLD, ischemic cardiomyopathy LVEF previously 25-30%; improved to >40%  with LifeVest,  complex CAD s/p multiple ADE with in-stent stenosis now on effient and aspirin, DVT on Eliquis, severe aortic stenosis s/p TAVR on 07/1/25, CVA, chronic hypotension on midodrine, non healing extremity ulcers who presents to hepatology clinic to establish care    Has a past medical history of MASH cirrhosis diagnosed in 2016 when he was seen by Dr. Rowe..  At that time he had a laparoscopic cholecystectomy where he was noted to have cirrhotic liver morphology intraoperatively and was referred to GI for follow-up.  Cirrhotic liver pathology was also noted on his CT abdomen and pelvis.  He underwent chronic liver disease workup including negative hepatitis panel, SOLEDAD, antimitochondrial antibody, normal ferritin and low AFP.  He was recommended to obtain an EGD which did not reveal esophageal varices.  He was compensated at that time and etiology of the cirrhosis was thought to be secondary to MASLD      He was since lost to  "follow up GI due to his other medical complications. He was seen again by GI in 2023 however, this was for rectal bleeding in the setting of Effient use. Colonoscopy showed diverticulosis but otherwise normal      Since then, he developed ESRD 2/2 hypertensive and diabetic nephropathy. He was previously listed for renal transplant with [facility] but is on hold right now due to his severe CAD on Effient and other additional medical co morbidities including severe aortic stenosis. He denies alcohol or tobacco use. Per his family, he hepatitis B as a child. Otherwise, no family history of liver disease. Denies worsening LE edema, abdominal distention, confusion, jaundice or scleral icterus. He recently underwent TAVR procedure at [facility] on 7/1/25.         HPI    Review of Systems A complete review of systems is negative other than that noted above in the HPI.      Current Medications[1]  Objective   /62 (BP Location: Left arm, Patient Position: Sitting, Cuff Size: Standard)   Pulse 61   Ht 5' 9\" (1.753 m)   SpO2 98%   BMI 29.83 kg/m²     Physical Exam  Vitals and nursing note reviewed.   Constitutional:       General: He is not in acute distress.     Appearance: He is well-developed.   HENT:      Head: Normocephalic and atraumatic.     Eyes:      Conjunctiva/sclera: Conjunctivae normal.       Cardiovascular:      Rate and Rhythm: Normal rate and regular rhythm.      Heart sounds: No murmur heard.  Pulmonary:      Effort: Pulmonary effort is normal. No respiratory distress.      Breath sounds: Normal breath sounds.   Abdominal:      Palpations: Abdomen is soft.      Tenderness: There is no abdominal tenderness.     Musculoskeletal:         General: No swelling.      Cervical back: Neck supple.      Comments: L AV fistula.     Multiple extremity wounds with overlying eschar     Skin:     General: Skin is warm and dry.      Capillary Refill: Capillary refill takes less than 2 seconds. "     Neurological:      Mental Status: He is alert.     Psychiatric:         Mood and Affect: Mood normal.            Lab Results: I personally reviewed relevant lab results.           Shayla Vinson MD  Gastroenterology Fellow, PGY- 5  Available on EPIC Secure Chat  7/15/2025 11:11 PM           [1]   Current Outpatient Medications   Medication Sig Dispense Refill    apixaban (Eliquis) 2.5 mg Take 1 tablet (2.5 mg total) by mouth 2 (two) times a day 60 tablet 3    aspirin 81 mg chewable tablet Chew 1 tablet in the morning.      atorvastatin (LIPITOR) 40 mg tablet Take 1 tablet (40 mg total) by mouth daily with dinner 90 tablet 3    D3-50 1.25 MG (00441 UT) capsule TAKE 1 CAPSULE BY MOUTH ONE TIME PER WEEK 12 capsule 4    divalproex sodium (Depakote) 125 mg DR tablet Take 1 tablet by mouth 2 (two) times a day      metoprolol succinate (TOPROL-XL) 25 mg 24 hr tablet Take 12.5 mg by mouth daily      midodrine (PROAMATINE) 5 mg tablet Take 3 tablets (15 mg total) by mouth 3 (three) times a day before meals 810 tablet 0    prasugrel (EFFIENT) tablet Take 1 tablet (10 mg total) by mouth daily 90 tablet 3    sodium hypochlorite (DAKIN'S QUARTER-STRENGTH) Moisten dry gauze with Dakin solution and apply to the wound daily. 473 mL 0    collagenase (SANTYL) ointment Apply topically daily (Patient not taking: Reported on 7/8/2025) 30 g 1     No current facility-administered medications for this visit.

## 2025-07-17 ENCOUNTER — OFFICE VISIT (OUTPATIENT)
Facility: CLINIC | Age: 65
End: 2025-07-17
Payer: MEDICARE

## 2025-07-17 ENCOUNTER — HOSPITAL ENCOUNTER (OUTPATIENT)
Dept: ULTRASOUND IMAGING | Facility: HOSPITAL | Age: 65
End: 2025-07-17
Attending: STUDENT IN AN ORGANIZED HEALTH CARE EDUCATION/TRAINING PROGRAM
Payer: MEDICARE

## 2025-07-17 DIAGNOSIS — R53.1 WEAKNESS GENERALIZED: Primary | ICD-10-CM

## 2025-07-17 DIAGNOSIS — R26.2 DIFFICULTY WALKING: ICD-10-CM

## 2025-07-17 PROCEDURE — 97110 THERAPEUTIC EXERCISES: CPT | Performed by: PHYSICAL THERAPIST

## 2025-07-17 PROCEDURE — 97530 THERAPEUTIC ACTIVITIES: CPT | Performed by: PHYSICAL THERAPIST

## 2025-07-17 PROCEDURE — 76705 ECHO EXAM OF ABDOMEN: CPT

## 2025-07-17 NOTE — PROGRESS NOTES
"Daily Note     Today's date: 2025  Patient name: Asad Galloway  : 1960  MRN: [id number]  Referring provider: Mallorie Ulloa*  Dx:   Encounter Diagnosis     ICD-10-CM    1. Weakness generalized  R53.1       2. Difficulty walking  R26.2                                   DO NOT OFFER WATER DUE TO FLUID RESTRICTION (dialysis)       Subjective: Patient with no new complaints today    Objective: See treatment diary below    TE/TA:  - Nustep L1 x 6 min consecutively  - Ambulation with RW 50 ft, CG  - STS, 1 airex today: 5 reps, min A  - Step ups to 4\" step with BHR's: 10 reps x 2 sets, CG  - Static standing x 7 min while playing checkers   - Mini squats: 10 reps    Assessment: Pt tolerated PT session better today with improved participation. Pt requested to play checkers, agreeable to stand the entire time. Also more agreeable to performing there ex like step ups and mini squats. No c/o pain today. Fewer rest breaks taken. Pt will benefit from continued skilled PT to reduce fall risk and reduce caregiver burden.       Plan: Continue per plan of care.      POC expires Unit limit Auth Expiration date PT/OT + Visit Limit? Co-Insurance      [employer] primary, 30pcy secondary Yes                                            Outcome Measures Initial Eval  24 Re-eval  10/10/24 Re eval   24 Re-eval  25 Re-eval  25 Re-eval  25 Re-eval  25   5xSTS 15.85 sec, 2 UE  15.41 sec, 2 UE  21.06 sec, pushing off thighs  46.97 sec pushing off thighs + mod A Only performed 3 reps, 25.63 sec 43.48 sec pushing off armrests + mod A 32.07 sec pushing off armrests + mod A   TUG  - Regular  - Cognitive   17.3 sec, no AD  24.3 sec, (serial 3s)   12.59 sec, no AD  18.56 sec, no AD (serial 3s)   16. 44 sec, pushing off thighs  19.74 sec, serial 3s   22.11 sec with RW    27.53 sec with RW, serial 3s   24.52 sec with RW   43.59 sec with RW   26.82 sec with rollator   10 meter   1.18 m/s without AD   0.86 m/s " with cane   0.71 m/s wo cane          BURGER 30/56 33/56 34/56 14/56 7/56 8/56 9/56   mCTSIB  - FTEO (firm)  - FTEC (firm)  - FTEO (foam)  - FTEC (foam)   30 sec +  30 ++  2 sec  0 sec   30 sec+  30 sec+  3 sec  0 sec   30 sec+  30 sec+  3 sec   0 sec       6MWT 100 ft (attempted but only able to complete 1 minute) 275 ft in 4 minutes  212 ft in 2 minutes w no cane   2MWT: 84 ft with RW and CG 2MWT: 104 ft with RW and min A 2MWT: 127 ft with min A and rollator, pt stopped at 1 min 41 sec   ABC 47.5% 81.88% 72.5%

## 2025-07-24 ENCOUNTER — OFFICE VISIT (OUTPATIENT)
Facility: CLINIC | Age: 65
End: 2025-07-24
Payer: MEDICARE

## 2025-07-24 DIAGNOSIS — R26.2 DIFFICULTY WALKING: ICD-10-CM

## 2025-07-24 DIAGNOSIS — R53.1 WEAKNESS GENERALIZED: Primary | ICD-10-CM

## 2025-07-24 PROCEDURE — 97530 THERAPEUTIC ACTIVITIES: CPT | Performed by: PHYSICAL THERAPIST

## 2025-07-24 PROCEDURE — 97110 THERAPEUTIC EXERCISES: CPT | Performed by: PHYSICAL THERAPIST

## 2025-07-24 NOTE — PROGRESS NOTES
Daily Note     Today's date: 2025  Patient name: Asad Galloway  : 1960  MRN: [id number]  Referring provider: Mallorie Ulloa*  Dx:   Encounter Diagnosis     ICD-10-CM    1. Weakness generalized  R53.1       2. Difficulty walking  R26.2                                     DO NOT OFFER WATER DUE TO FLUID RESTRICTION (dialysis)       Subjective: Patient with no new complaints today    Objective: See treatment diary below    TE/TA:  - Nustep L1 x 5 min consecutively  - STS from standard chair with armrests: 5 reps x 2 sets, mod A  - Standing x 5 min and then x 3 min, while playing checkers      Assessment: Pt tolerated PT session fair today. Decreased participation today; this fluctuates day to day based on fatigue and behavior. Pt's wife reporting increased caregiver burden due to having to lift pt off floor; he continues to slide out of chairs and is falling frequently. Educated pt and wife on options for floor lifters that may help reduce caregiver burden. Overall he required mod A to stand from standard height chair today; pt apprehensive to weight shift anteriorly due to fear of falling. Pt will benefit from continued skilled PT to reduce fall risk and reduce caregiver burden.       Plan: Continue per plan of care.      POC expires Unit limit Auth Expiration date PT/OT + Visit Limit? Co-Insurance      [facility] primary, [facility]pcy secondary Yes                                            Outcome Measures Initial Eval  24 Re-eval  10/10/24 Re eval   24 Re-eval  25 Re-eval  25 Re-eval  25 Re-eval  25   5xSTS 15.85 sec, 2 UE  15.41 sec, 2 UE  21.06 sec, pushing off thighs  46.97 sec pushing off thighs + mod A Only performed 3 reps, 25.63 sec 43.48 sec pushing off armrests + mod A 32.07 sec pushing off armrests + mod A   TUG  - Regular  - Cognitive   17.3 sec, no AD  24.3 sec, (serial 3s)   12.59 sec, no AD  18.56 sec, no AD (serial 3s)   16. 44 sec, pushing off thighs  19.74 sec,  serial 3s   22.11 sec with RW    27.53 sec with RW, serial 3s   24.52 sec with RW   43.59 sec with RW   26.82 sec with rollator   10 meter   1.18 m/s without AD   0.86 m/s with cane   0.71 m/s wo cane          BURGER 30/56 33/56 34/56 14/56 7/56 8/56 9/56   mCTSIB  - FTEO (firm)  - FTEC (firm)  - FTEO (foam)  - FTEC (foam)   30 sec +  30 ++  2 sec  0 sec   30 sec+  30 sec+  3 sec  0 sec   30 sec+  30 sec+  3 sec   0 sec       6MWT 100 ft (attempted but only able to complete 1 minute) 275 ft in 4 minutes  212 ft in 2 minutes w no cane   2MWT: 84 ft with RW and CG 2MWT: 104 ft with RW and min A 2MWT: 127 ft with min A and rollator, pt stopped at 1 min 41 sec   ABC 47.5% 81.88% 72.5%

## 2025-07-29 ENCOUNTER — TELEMEDICINE (OUTPATIENT)
Dept: CARDIOLOGY CLINIC | Facility: CLINIC | Age: 65
End: 2025-07-29
Payer: MEDICARE

## 2025-07-29 ENCOUNTER — OFFICE VISIT (OUTPATIENT)
Facility: CLINIC | Age: 65
End: 2025-07-29
Attending: PHYSICIAN ASSISTANT
Payer: MEDICARE

## 2025-07-29 ENCOUNTER — OFFICE VISIT (OUTPATIENT)
Facility: CLINIC | Age: 65
End: 2025-07-29
Payer: MEDICARE

## 2025-07-29 DIAGNOSIS — Z86.73 H/O: STROKE: Primary | ICD-10-CM

## 2025-07-29 DIAGNOSIS — Z78.9 SELF-CARE DEFICIT: ICD-10-CM

## 2025-07-29 DIAGNOSIS — Z79.02 ANTIPLATELET OR ANTITHROMBOTIC LONG-TERM USE: ICD-10-CM

## 2025-07-29 DIAGNOSIS — N18.6 ESRD ON DIALYSIS (HCC): ICD-10-CM

## 2025-07-29 DIAGNOSIS — I10 PRIMARY HYPERTENSION: ICD-10-CM

## 2025-07-29 DIAGNOSIS — I45.2 BIFASCICULAR BLOCK: ICD-10-CM

## 2025-07-29 DIAGNOSIS — Z95.5 STATUS POST INSERTION OF DRUG ELUTING CORONARY ARTERY STENT: ICD-10-CM

## 2025-07-29 DIAGNOSIS — I69.319 COGNITIVE DEFICITS FOLLOWING CEREBRAL INFARCTION: ICD-10-CM

## 2025-07-29 DIAGNOSIS — Z99.2 ESRD ON DIALYSIS (HCC): ICD-10-CM

## 2025-07-29 DIAGNOSIS — Z95.2 S/P TAVR (TRANSCATHETER AORTIC VALVE REPLACEMENT): ICD-10-CM

## 2025-07-29 DIAGNOSIS — I25.5 ISCHEMIC CARDIOMYOPATHY: ICD-10-CM

## 2025-07-29 DIAGNOSIS — R26.2 DIFFICULTY WALKING: ICD-10-CM

## 2025-07-29 DIAGNOSIS — I35.0 NONRHEUMATIC AORTIC VALVE STENOSIS: Primary | ICD-10-CM

## 2025-07-29 DIAGNOSIS — I25.10 CAD, MULTIPLE VESSEL: ICD-10-CM

## 2025-07-29 DIAGNOSIS — E78.2 MIXED HYPERLIPIDEMIA: ICD-10-CM

## 2025-07-29 DIAGNOSIS — I50.22 CHRONIC SYSTOLIC HEART FAILURE (HCC): ICD-10-CM

## 2025-07-29 DIAGNOSIS — R53.1 WEAKNESS GENERALIZED: Primary | ICD-10-CM

## 2025-07-29 PROCEDURE — 99214 OFFICE O/P EST MOD 30 MIN: CPT | Performed by: INTERNAL MEDICINE

## 2025-07-29 PROCEDURE — 97110 THERAPEUTIC EXERCISES: CPT | Performed by: PHYSICAL THERAPIST

## 2025-07-29 PROCEDURE — 97530 THERAPEUTIC ACTIVITIES: CPT

## 2025-07-29 PROCEDURE — 97110 THERAPEUTIC EXERCISES: CPT

## 2025-07-29 PROCEDURE — 97530 THERAPEUTIC ACTIVITIES: CPT | Performed by: PHYSICAL THERAPIST

## 2025-07-31 ENCOUNTER — APPOINTMENT (OUTPATIENT)
Facility: CLINIC | Age: 65
End: 2025-07-31
Attending: PHYSICIAN ASSISTANT
Payer: MEDICARE

## 2025-07-31 ENCOUNTER — TELEPHONE (OUTPATIENT)
Age: 65
End: 2025-07-31

## 2025-07-31 ENCOUNTER — OFFICE VISIT (OUTPATIENT)
Dept: WOUND CARE | Facility: HOSPITAL | Age: 65
End: 2025-07-31
Payer: MEDICARE

## 2025-07-31 ENCOUNTER — EVALUATION (OUTPATIENT)
Facility: CLINIC | Age: 65
End: 2025-07-31
Payer: MEDICARE

## 2025-07-31 VITALS
DIASTOLIC BLOOD PRESSURE: 58 MMHG | TEMPERATURE: 96.5 F | SYSTOLIC BLOOD PRESSURE: 127 MMHG | HEART RATE: 92 BPM | RESPIRATION RATE: 16 BRPM

## 2025-07-31 DIAGNOSIS — L97.512 ULCER OF TOE OF RIGHT FOOT, WITH FAT LAYER EXPOSED (HCC): Primary | ICD-10-CM

## 2025-07-31 DIAGNOSIS — E11.40 TYPE 2 DIABETES MELLITUS WITH DIABETIC NEUROPATHY, UNSPECIFIED WHETHER LONG TERM INSULIN USE (HCC): ICD-10-CM

## 2025-07-31 DIAGNOSIS — R26.2 DIFFICULTY WALKING: ICD-10-CM

## 2025-07-31 DIAGNOSIS — R53.1 WEAKNESS GENERALIZED: Primary | ICD-10-CM

## 2025-07-31 DIAGNOSIS — I73.9 PERIPHERAL ARTERIAL DISEASE (HCC): ICD-10-CM

## 2025-07-31 PROCEDURE — 99214 OFFICE O/P EST MOD 30 MIN: CPT | Performed by: PODIATRIST

## 2025-07-31 PROCEDURE — 11042 DBRDMT SUBQ TIS 1ST 20SQCM/<: CPT | Performed by: PODIATRIST

## 2025-07-31 PROCEDURE — 97530 THERAPEUTIC ACTIVITIES: CPT | Performed by: PHYSICAL THERAPIST

## 2025-07-31 RX ORDER — LIDOCAINE 40 MG/G
CREAM TOPICAL ONCE
Status: COMPLETED | OUTPATIENT
Start: 2025-07-31 | End: 2025-07-31

## 2025-07-31 RX ADMIN — LIDOCAINE: 40 CREAM TOPICAL at 09:52

## 2025-08-01 ENCOUNTER — HOSPITAL ENCOUNTER (OUTPATIENT)
Dept: NON INVASIVE DIAGNOSTICS | Facility: HOSPITAL | Age: 65
Discharge: HOME | End: 2025-08-01
Attending: NURSE PRACTITIONER
Payer: MEDICARE

## 2025-08-01 DIAGNOSIS — R23.4 ESCHAR OF MULTIPLE SITES: ICD-10-CM

## 2025-08-01 PROCEDURE — 93930 UPPER EXTREMITY STUDY: CPT

## 2025-08-02 PROCEDURE — 93930 UPPER EXTREMITY STUDY: CPT | Performed by: SURGERY

## 2025-08-05 ENCOUNTER — TELEPHONE (OUTPATIENT)
Age: 65
End: 2025-08-05

## 2025-08-08 ENCOUNTER — TELEPHONE (OUTPATIENT)
Dept: CARDIOLOGY CLINIC | Facility: CLINIC | Age: 65
End: 2025-08-08

## 2025-08-12 ENCOUNTER — OFFICE VISIT (OUTPATIENT)
Facility: CLINIC | Age: 65
End: 2025-08-12
Payer: MEDICARE

## 2025-08-12 ENCOUNTER — HOSPITAL ENCOUNTER (OUTPATIENT)
Dept: NON INVASIVE DIAGNOSTICS | Facility: HOSPITAL | Age: 65
Discharge: HOME | End: 2025-08-12
Payer: MEDICARE

## 2025-08-14 ENCOUNTER — OFFICE VISIT (OUTPATIENT)
Dept: FAMILY MEDICINE CLINIC | Facility: CLINIC | Age: 65
End: 2025-08-14
Payer: MEDICARE

## 2025-08-14 ENCOUNTER — OFFICE VISIT (OUTPATIENT)
Facility: CLINIC | Age: 65
End: 2025-08-14
Payer: MEDICARE

## 2025-08-14 ENCOUNTER — OFFICE VISIT (OUTPATIENT)
Dept: WOUND CARE | Facility: HOSPITAL | Age: 65
End: 2025-08-14
Payer: MEDICARE

## 2025-08-14 PROBLEM — G93.40 ACUTE ENCEPHALOPATHY: Status: RESOLVED | Noted: 2025-03-21 | Resolved: 2025-08-14
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
## 2025-08-14 PROBLEM — N18.6 ANEMIA DUE TO CHRONIC KIDNEY DISEASE, ON CHRONIC DIALYSIS (HCC): Status: ACTIVE | Noted: 2020-10-05

## 2025-08-14 PROBLEM — M79.89 LEFT ARM SWELLING: Status: RESOLVED | Noted: 2022-10-21 | Resolved: 2025-08-14

## 2025-08-14 PROBLEM — E87.5 HYPERKALEMIA: Status: RESOLVED | Noted: 2025-04-21 | Resolved: 2025-08-14

## 2025-08-14 PROBLEM — M62.84 SARCOPENIA: Status: RESOLVED | Noted: 2025-04-25 | Resolved: 2025-08-14

## 2025-08-14 PROBLEM — R80.9 NEPHROTIC RANGE PROTEINURIA: Status: RESOLVED | Noted: 2018-03-28 | Resolved: 2025-08-14

## 2025-08-14 PROBLEM — M20.41 HAMMER TOES OF BOTH FEET: Status: RESOLVED | Noted: 2023-03-07 | Resolved: 2025-08-14

## 2025-08-14 PROBLEM — Z95.3 S/P TAVR (TRANSCATHETER AORTIC VALVE REPLACEMENT), BIOPROSTHETIC: Status: ACTIVE | Noted: 2025-08-14

## 2025-08-14 PROBLEM — L98.9 FINGER LESION: Status: RESOLVED | Noted: 2025-05-22 | Resolved: 2025-08-14

## 2025-08-14 PROBLEM — E87.70 FLUID OVERLOAD: Status: RESOLVED | Noted: 2025-03-22 | Resolved: 2025-08-14

## 2025-08-14 PROBLEM — R06.02 SOB (SHORTNESS OF BREATH): Status: RESOLVED | Noted: 2022-10-21 | Resolved: 2025-08-14

## 2025-08-14 PROBLEM — Z51.5 PALLIATIVE CARE BY SPECIALIST: Status: RESOLVED | Noted: 2025-05-19 | Resolved: 2025-08-14

## 2025-08-14 PROBLEM — N39.41 URGE INCONTINENCE: Status: RESOLVED | Noted: 2019-12-20 | Resolved: 2025-08-14

## 2025-08-14 PROBLEM — F39 MOOD DISORDER (HCC): Chronic | Status: RESOLVED | Noted: 2023-02-02 | Resolved: 2025-08-14

## 2025-08-14 PROBLEM — I50.20 HFREF (HEART FAILURE WITH REDUCED EJECTION FRACTION) (HCC): Status: ACTIVE | Noted: 2024-01-09

## 2025-08-14 PROBLEM — R60.0 EDEMA OF LEFT LOWER EXTREMITY: Status: RESOLVED | Noted: 2023-12-26 | Resolved: 2025-08-14

## 2025-08-14 PROBLEM — E87.8 ELECTROLYTE ABNORMALITY: Status: RESOLVED | Noted: 2022-05-02 | Resolved: 2025-08-14

## 2025-08-14 PROBLEM — Z95.810 PRESENCE OF EXTERNAL CARDIAC DEFIBRILLATOR: Status: RESOLVED | Noted: 2023-03-17 | Resolved: 2025-08-14

## 2025-08-14 PROBLEM — R74.8 ELEVATED ALKALINE PHOSPHATASE LEVEL: Status: RESOLVED | Noted: 2018-08-03 | Resolved: 2025-08-14

## 2025-08-14 PROBLEM — R73.03 PRE-DIABETES: Status: RESOLVED | Noted: 2024-06-13 | Resolved: 2025-08-14

## 2025-08-14 PROBLEM — E66.811 OBESITY (BMI 30.0-34.9): Status: RESOLVED | Noted: 2024-07-17 | Resolved: 2025-08-14

## 2025-08-14 PROBLEM — K62.5 RECTAL BLEEDING: Status: RESOLVED | Noted: 2023-12-05 | Resolved: 2025-08-14

## 2025-08-14 PROBLEM — Z95.3 S/P TAVR (TRANSCATHETER AORTIC VALVE REPLACEMENT), BIOPROSTHETIC: Status: ACTIVE | Noted: 2023-02-02

## 2025-08-14 PROBLEM — Z99.2 ANEMIA DUE TO CHRONIC KIDNEY DISEASE, ON CHRONIC DIALYSIS (HCC): Status: ACTIVE | Noted: 2020-10-05

## 2025-08-14 PROBLEM — I82.5Z1 CHRONIC DEEP VEIN THROMBOSIS (DVT) OF DISTAL VEIN OF RIGHT LOWER EXTREMITY (HCC): Status: ACTIVE | Noted: 2024-07-14

## 2025-08-14 PROBLEM — R26.2 AMBULATORY DYSFUNCTION: Status: ACTIVE | Noted: 2022-04-19

## 2025-08-14 PROBLEM — M25.571 RIGHT ANKLE PAIN: Status: RESOLVED | Noted: 2024-07-05 | Resolved: 2025-08-14

## 2025-08-14 PROBLEM — Z86.16 HISTORY OF 2019 NOVEL CORONAVIRUS DISEASE (COVID-19): Status: RESOLVED | Noted: 2020-12-26 | Resolved: 2025-08-14

## 2025-08-14 PROBLEM — D63.1 ANEMIA DUE TO CHRONIC KIDNEY DISEASE, ON CHRONIC DIALYSIS (HCC): Status: ACTIVE | Noted: 2020-10-05

## 2025-08-14 PROBLEM — R45.86 EMOTIONAL LABILITY: Status: RESOLVED | Noted: 2022-01-19 | Resolved: 2025-08-14

## 2025-08-14 PROBLEM — D75.89 BICYTOPENIA: Status: RESOLVED | Noted: 2024-07-17 | Resolved: 2025-08-14

## 2025-08-14 PROBLEM — M20.42 HAMMER TOES OF BOTH FEET: Status: RESOLVED | Noted: 2023-03-07 | Resolved: 2025-08-14

## 2025-08-14 PROBLEM — K59.09 OTHER CONSTIPATION: Status: RESOLVED | Noted: 2018-08-17 | Resolved: 2025-08-14

## 2025-08-14 PROBLEM — R94.01 ABNORMAL EEG: Status: RESOLVED | Noted: 2018-09-24 | Resolved: 2025-08-14

## 2025-08-14 PROBLEM — F39 MOOD DISORDER (HCC): Chronic | Status: ACTIVE | Noted: 2019-02-28

## 2025-08-14 PROBLEM — R07.9 CHEST PAIN: Status: RESOLVED | Noted: 2022-05-01 | Resolved: 2025-08-14

## 2025-08-14 PROBLEM — R57.9 SHOCK (HCC): Status: RESOLVED | Noted: 2025-05-19 | Resolved: 2025-08-14

## 2025-08-14 PROBLEM — Z95.3 S/P TAVR (TRANSCATHETER AORTIC VALVE REPLACEMENT), BIOPROSTHETIC: Status: ACTIVE | Noted: 2025-05-19

## 2025-08-14 PROBLEM — Z71.89 GOALS OF CARE, COUNSELING/DISCUSSION: Status: RESOLVED | Noted: 2025-05-19 | Resolved: 2025-08-14

## 2025-08-14 PROBLEM — R65.10 SIRS (SYSTEMIC INFLAMMATORY RESPONSE SYNDROME) (HCC): Status: RESOLVED | Noted: 2025-03-21 | Resolved: 2025-08-14

## 2025-08-16 ENCOUNTER — APPOINTMENT (EMERGENCY)
Dept: RADIOLOGY | Facility: HOSPITAL | Age: 65
End: 2025-08-16
Payer: MEDICARE

## 2025-08-16 ENCOUNTER — APPOINTMENT (EMERGENCY)
Dept: CT IMAGING | Facility: HOSPITAL | Age: 65
End: 2025-08-16
Payer: MEDICARE

## 2025-08-16 ENCOUNTER — HOSPITAL ENCOUNTER (EMERGENCY)
Facility: HOSPITAL | Age: 65
Discharge: HOME | End: 2025-08-16
Attending: SURGERY
Payer: MEDICARE

## 2025-08-16 VITALS
HEART RATE: 78 BPM | SYSTOLIC BLOOD PRESSURE: 108 MMHG | RESPIRATION RATE: 16 BRPM | DIASTOLIC BLOOD PRESSURE: 54 MMHG | TEMPERATURE: 97.3 F | OXYGEN SATURATION: 95 %

## 2025-08-16 PROBLEM — S00.03XA SCALP HEMATOMA: Status: ACTIVE | Noted: 2025-08-16

## 2025-08-16 PROBLEM — S50.811A ABRASION OF RIGHT FOREARM: Status: ACTIVE | Noted: 2025-08-16

## 2025-08-16 PROBLEM — S99.922A INJURY OF GREAT TOE OF LEFT FOOT: Status: ACTIVE | Noted: 2025-08-16

## 2025-08-16 LAB
ALBUMIN SERPL BCG-MCNC: 3.6 G/DL (ref 3.5–5)
ALP SERPL-CCNC: 124 U/L (ref 34–104)
ALT SERPL W P-5'-P-CCNC: 3 U/L (ref 7–52)
ANION GAP SERPL CALCULATED.3IONS-SCNC: 10 MMOL/L (ref 4–13)
ANISOCYTOSIS BLD QL SMEAR: PRESENT
APTT PPP: 31 SECONDS (ref 23–34)
AST SERPL W P-5'-P-CCNC: 9 U/L (ref 13–39)
BASOPHILS # BLD AUTO: 0.03 THOUSANDS/ÂΜL (ref 0–0.1)
BASOPHILS NFR BLD AUTO: 1 % (ref 0–1)
BILIRUB SERPL-MCNC: 0.92 MG/DL (ref 0.2–1)
BUN SERPL-MCNC: 20 MG/DL (ref 5–25)
CALCIUM SERPL-MCNC: 8 MG/DL (ref 8.4–10.2)
CHLORIDE SERPL-SCNC: 99 MMOL/L (ref 96–108)
CO2 SERPL-SCNC: 26 MMOL/L (ref 21–32)
CREAT SERPL-MCNC: 3.84 MG/DL (ref 0.6–1.3)
EOSINOPHIL # BLD AUTO: 0.19 THOUSAND/ÂΜL (ref 0–0.61)
EOSINOPHIL NFR BLD AUTO: 4 % (ref 0–6)
ERYTHROCYTE [DISTWIDTH] IN BLOOD BY AUTOMATED COUNT: 16.3 % (ref 11.6–15.1)
GFR SERPL CREATININE-BSD FRML MDRD: 15 ML/MIN/1.73SQ M
GLUCOSE SERPL-MCNC: 170 MG/DL (ref 65–140)
HCT VFR BLD AUTO: 33 % (ref 36.5–49.3)
HGB BLD-MCNC: 10 G/DL (ref 12–17)
IMM GRANULOCYTES # BLD AUTO: 0.02 THOUSAND/UL (ref 0–0.2)
IMM GRANULOCYTES NFR BLD AUTO: 0 % (ref 0–2)
INR PPP: 1.12 (ref 0.85–1.19)
LYMPHOCYTES # BLD AUTO: 0.68 THOUSANDS/ÂΜL (ref 0.6–4.47)
LYMPHOCYTES NFR BLD AUTO: 15 % (ref 14–44)
MCH RBC QN AUTO: 31 PG (ref 26.8–34.3)
MCHC RBC AUTO-ENTMCNC: 30.3 G/DL (ref 31.4–37.4)
MCV RBC AUTO: 102 FL (ref 82–98)
MONOCYTES # BLD AUTO: 0.5 THOUSAND/ÂΜL (ref 0.17–1.22)
MONOCYTES NFR BLD AUTO: 11 % (ref 4–12)
NEUTROPHILS # BLD AUTO: 3.24 THOUSANDS/ÂΜL (ref 1.85–7.62)
NEUTS SEG NFR BLD AUTO: 69 % (ref 43–75)
NRBC BLD AUTO-RTO: 0 /100 WBCS
PLATELET # BLD AUTO: 74 THOUSANDS/UL (ref 149–390)
PLATELET BLD QL SMEAR: ABNORMAL
PMV BLD AUTO: 12.3 FL (ref 8.9–12.7)
POTASSIUM SERPL-SCNC: 3.7 MMOL/L (ref 3.5–5.3)
PROT SERPL-MCNC: 6.7 G/DL (ref 6.4–8.4)
PROTHROMBIN TIME: 15.1 SECONDS (ref 12.3–15)
RBC # BLD AUTO: 3.23 MILLION/UL (ref 3.88–5.62)
RBC MORPH BLD: PRESENT
SODIUM SERPL-SCNC: 135 MMOL/L (ref 135–147)
WBC # BLD AUTO: 4.66 THOUSAND/UL (ref 4.31–10.16)

## 2025-08-16 PROCEDURE — EDAIR PR ED AIR: Performed by: EMERGENCY MEDICINE

## 2025-08-16 PROCEDURE — 85025 COMPLETE CBC W/AUTO DIFF WBC: CPT | Performed by: PHYSICIAN ASSISTANT

## 2025-08-16 PROCEDURE — 80053 COMPREHEN METABOLIC PANEL: CPT | Performed by: PHYSICIAN ASSISTANT

## 2025-08-16 PROCEDURE — 85610 PROTHROMBIN TIME: CPT | Performed by: PHYSICIAN ASSISTANT

## 2025-08-16 PROCEDURE — 71045 X-RAY EXAM CHEST 1 VIEW: CPT

## 2025-08-16 PROCEDURE — 72125 CT NECK SPINE W/O DYE: CPT

## 2025-08-16 PROCEDURE — 99284 EMERGENCY DEPT VISIT MOD MDM: CPT | Performed by: SURGERY

## 2025-08-16 PROCEDURE — 85730 THROMBOPLASTIN TIME PARTIAL: CPT | Performed by: PHYSICIAN ASSISTANT

## 2025-08-16 PROCEDURE — 70450 CT HEAD/BRAIN W/O DYE: CPT

## 2025-08-16 PROCEDURE — 36415 COLL VENOUS BLD VENIPUNCTURE: CPT | Performed by: PHYSICIAN ASSISTANT

## 2025-08-16 PROCEDURE — 99284 EMERGENCY DEPT VISIT MOD MDM: CPT

## 2025-08-19 ENCOUNTER — OFFICE VISIT (OUTPATIENT)
Facility: CLINIC | Age: 65
End: 2025-08-19
Attending: PHYSICIAN ASSISTANT
Payer: MEDICARE

## 2025-08-19 ENCOUNTER — OFFICE VISIT (OUTPATIENT)
Facility: CLINIC | Age: 65
End: 2025-08-19
Payer: MEDICARE

## 2025-08-19 DIAGNOSIS — Z78.9 SELF-CARE DEFICIT: ICD-10-CM

## 2025-08-19 DIAGNOSIS — R26.2 DIFFICULTY WALKING: ICD-10-CM

## 2025-08-19 DIAGNOSIS — Z86.73 H/O: STROKE: Primary | ICD-10-CM

## 2025-08-19 DIAGNOSIS — R53.1 WEAKNESS GENERALIZED: Primary | ICD-10-CM

## 2025-08-19 DIAGNOSIS — I69.319 COGNITIVE DEFICITS FOLLOWING CEREBRAL INFARCTION: ICD-10-CM

## 2025-08-19 PROCEDURE — 97530 THERAPEUTIC ACTIVITIES: CPT | Performed by: PHYSICAL THERAPIST

## 2025-08-19 PROCEDURE — 97110 THERAPEUTIC EXERCISES: CPT

## 2025-08-19 PROCEDURE — 97530 THERAPEUTIC ACTIVITIES: CPT

## 2025-08-19 PROCEDURE — 97110 THERAPEUTIC EXERCISES: CPT | Performed by: PHYSICAL THERAPIST

## 2025-08-20 ENCOUNTER — TELEPHONE (OUTPATIENT)
Age: 65
End: 2025-08-20

## 2025-08-21 ENCOUNTER — OFFICE VISIT (OUTPATIENT)
Facility: CLINIC | Age: 65
End: 2025-08-21
Attending: PHYSICIAN ASSISTANT
Payer: MEDICARE

## 2025-08-21 DIAGNOSIS — Z86.73 H/O: STROKE: Primary | ICD-10-CM

## 2025-08-21 DIAGNOSIS — Z78.9 SELF-CARE DEFICIT: ICD-10-CM

## 2025-08-21 DIAGNOSIS — I69.319 COGNITIVE DEFICITS FOLLOWING CEREBRAL INFARCTION: ICD-10-CM

## 2025-08-21 PROCEDURE — 97110 THERAPEUTIC EXERCISES: CPT

## 2025-08-21 PROCEDURE — 97530 THERAPEUTIC ACTIVITIES: CPT

## 2025-08-27 PROBLEM — I70.299 ATHEROSCLEROSIS OF ARTERY OF EXTREMITY WITH ULCERATION (HCC): Status: ACTIVE | Noted: 2024-01-18

## 2025-08-27 PROBLEM — L97.909 ATHEROSCLEROSIS OF ARTERY OF EXTREMITY WITH ULCERATION (HCC): Status: ACTIVE | Noted: 2024-01-18

## 2025-08-27 PROBLEM — S61.209A OPEN WOUND OF FINGER OF RIGHT HAND: Chronic | Status: ACTIVE | Noted: 2025-08-27

## (undated) DEVICE — 4 F TEMPO AQUA 0.038  65CM VER: Brand: TEMPO AQUA

## (undated) DEVICE — SEEKER® CROSSING SUPPORT CATHETER 0.014" GUIDEWIRE COMPATIBLE, 150CM: Brand: SEEKER®

## (undated) DEVICE — GUIDEWIRE WHOLEY HI TORQUE INTERM MOD J .035 145CM

## (undated) DEVICE — BALLOON TRAPPER .014 IN 6-8FR 10MM WIRE

## (undated) DEVICE — Device

## (undated) DEVICE — GLOVE SRG BIOGEL 7.5

## (undated) DEVICE — CATH TEMPO AQUA 4FVER135Â°100CM: Brand: TEMPO AQUA

## (undated) DEVICE — RUNTHROUGH NS EXTRA FLOPPY PTCA GUIDEWIRE: Brand: RUNTHROUGH

## (undated) DEVICE — RADIFOCUS OPTITORQUE ANGIOGRAPHIC CATHETER: Brand: OPTITORQUE

## (undated) DEVICE — SNAP KOVER: Brand: UNBRANDED

## (undated) DEVICE — CATH DIAG CXI 2.3FR 2.3FR 0.014 IN 150CM ANG

## (undated) DEVICE — TREK CORONARY DILATATION CATHETER 3.0 MM X 20 MM / RAPID-EXCHANGE: Brand: TREK

## (undated) DEVICE — CATH DIAG 5FR .035 65CM 6S OMMI-FLUSH

## (undated) DEVICE — STERILE ICS CARDIOVASCULAR PK: Brand: CARDINAL HEALTH

## (undated) DEVICE — PROBE COVER: Brand: STERILE PROBE COVER

## (undated) DEVICE — RADIFOCUS GLIDEWIRE ADVANTAGE GUIDEWIRE: Brand: GLIDEWIRE ADVANTAGE

## (undated) DEVICE — GLIDESHEATH BASIC HYDROPHILIC COATED INTRODUCER SHEATH: Brand: GLIDESHEATH

## (undated) DEVICE — GAUZE SPONGES,16 PLY: Brand: CURITY

## (undated) DEVICE — RADIFOCUS TORQUE DEVICE MULTI-TORQUE VISE: Brand: RADIFOCUS TORQUE DEVICE

## (undated) DEVICE — PRESTO™ INFLATION DEVICE: Brand: PRESTO

## (undated) DEVICE — GUIDEWIRE V-14 SHORT TAPER 300 ST

## (undated) DEVICE — ADHESIVE SKIN HIGH VISCOSITY EXOFIN 1ML

## (undated) DEVICE — MICROPUNCTURE 501

## (undated) DEVICE — CORONARY IMAGING CATHETER: Brand: OPTICROSS™ 6 HD

## (undated) DEVICE — CATH GUIDE LAUNCHER 6FR EBU 3.5

## (undated) DEVICE — PACLITAXEL-COATED PTA BALLOON CATHETER: Brand: RANGER™

## (undated) DEVICE — PINNACLE R/O II INTRODUCER SHEATH WITH RADIOPAQUE MARKER: Brand: PINNACLE

## (undated) DEVICE — GLIDESHEATH SLENDER STAINLESS STEEL KIT: Brand: GLIDESHEATH SLENDER

## (undated) DEVICE — NC TREK CORONARY DILATATION CATHETER 2.5 MM X 15 MM / RAPID-EXCHANGE: Brand: NC TREK

## (undated) DEVICE — CATH BAL CHARGER 5 X 20MM X 75CM

## (undated) DEVICE — MINI TREK CORONARY DILATATION CATHETER 2.0 MM X 15 MM / RAPID-EXCHANGE: Brand: MINI TREK

## (undated) DEVICE — HI-TORQUE COMMAND 18 ST GUIDE WIRE .018" 10 CM / 4 G, 300 CM: Brand: HI-TORQUE COMMAND

## (undated) DEVICE — 3M™ TEGADERM™ TRANSPARENT FILM DRESSING FRAME STYLE, 1624W, 2-3/8 IN X 2-3/4 IN (6 CM X 7 CM), 100/CT 4CT/CASE: Brand: 3M™ TEGADERM™

## (undated) DEVICE — TR BAND RADIAL ARTERY COMPRESSION DEVICE: Brand: TR BAND

## (undated) DEVICE — DGW .035 FC J3MM 260CM TEF: Brand: EMERALD

## (undated) DEVICE — PHOENIX 0.014" GUIDEWIRE: Brand: PHOENIX FLOPPY TIP, LIGHT SUPPORT GUIDEWIRE

## (undated) DEVICE — CATH DIAG 5FR IMPULSE 100CM FR4

## (undated) DEVICE — RADIFOCUS GLIDEWIRE: Brand: GLIDEWIRE

## (undated) DEVICE — 0.014" GUIDEWIRE: Brand: PHOENIX FLOPPY TIP, EXTRA SUPPORT GUIDEWIRE

## (undated) DEVICE — GUIDELINER CATHETERS ARE INTENDED TO BE USED IN CONJUNCTION WITH GUIDE CATHETERS TO ACCESS DISCRETE REGIONS OF THE CORONARY AND/OR PERIPHERAL VASCULATURE, AND TO FACILITATE PLACEMENT OF INTERVENTIONAL DEVICES.: Brand: GUIDELINER® V3 CATHETER

## (undated) DEVICE — NC TREK NEO™ CORONARY DILATATION CATHETER 3.50 MM X 15 MM / RAPID-EXCHANGE: Brand: NC TREK NEO™

## (undated) DEVICE — CATH BAL STERLING OTW 2 X 60MM X 150CM

## (undated) DEVICE — DESTINATION PERIPHERAL GUIDING SHEATH: Brand: DESTINATION

## (undated) DEVICE — FLEXOR, CHECK-FLO, INTRODUCER RAABE MODIFICATION: Brand: FLEXOR

## (undated) DEVICE — HI-TORQUE PILOT 150 GUIDE WIRE .014 STRAIGHT TIP 3.0 CM X 300 CM: Brand: HI-TORQUE PILOT

## (undated) DEVICE — MYNX CONTROL VCD 5F
Type: IMPLANTABLE DEVICE | Site: GROIN | Status: NON-FUNCTIONAL
Brand: MYNX CONTROL

## (undated) DEVICE — FLUID MANAGEMENT KIT - IR

## (undated) DEVICE — HI-TORQUE WHISPER MS GUIDE WIRE .014 STRAIGHT TIP 3.0 CM X 300 CM: Brand: HI-TORQUE WHISPER

## (undated) DEVICE — GLOVE INDICATOR PI UNDERGLOVE SZ 8 BLUE

## (undated) DEVICE — HI-TORQUE PILOT 50 GUIDE WIRE .014 STRAIGHT TIP 3.0 CM X 300 CM: Brand: HI-TORQUE PILOT

## (undated) DEVICE — Device: Brand: PHOENIX 1.5MM X 149CM 4F ATHERECTOMY SYSTEM

## (undated) DEVICE — THE TURNPIKE CATHETERS ARE INTENDED TO BE USED TO ACCESS DISCRETE REGIONS OF THE CORONARY AND/OR PERIPHERAL VASCULATURE. THEY MAY BE USED TO FACILITATE PLACEMENT AND EXCHANGE OF GUIDEWIRES AND TO SUBSELECTIVELY INFUSE/DELIVER DIAGNOSTIC AND THERAPEUTIC AGENTS.: Brand: TURNPIKE® CATHETER